# Patient Record
Sex: FEMALE | Race: WHITE | Employment: OTHER
[De-identification: names, ages, dates, MRNs, and addresses within clinical notes are randomized per-mention and may not be internally consistent; named-entity substitution may affect disease eponyms.]

---

## 2017-02-20 ENCOUNTER — OFFICE VISIT (OUTPATIENT)
Dept: INTERNAL MEDICINE | Facility: CLINIC | Age: 67
End: 2017-02-20

## 2017-02-20 VITALS
BODY MASS INDEX: 30.22 KG/M2 | HEIGHT: 64 IN | SYSTOLIC BLOOD PRESSURE: 130 MMHG | WEIGHT: 177 LBS | DIASTOLIC BLOOD PRESSURE: 76 MMHG

## 2017-02-20 DIAGNOSIS — R60.0 EDEMA EXTREMITIES: ICD-10-CM

## 2017-02-20 DIAGNOSIS — M47.816 FACET ARTHRITIS OF LUMBAR REGION: ICD-10-CM

## 2017-02-20 DIAGNOSIS — I10 ESSENTIAL HYPERTENSION: ICD-10-CM

## 2017-02-20 DIAGNOSIS — E78.2 MIXED HYPERLIPIDEMIA: ICD-10-CM

## 2017-02-20 DIAGNOSIS — G57.12 MERALGIA PARAESTHETICA, LEFT: ICD-10-CM

## 2017-02-20 DIAGNOSIS — F33.1 MODERATE EPISODE OF RECURRENT MAJOR DEPRESSIVE DISORDER (HCC): ICD-10-CM

## 2017-02-20 DIAGNOSIS — I63.429 CEREBROVASCULAR ACCIDENT (CVA) DUE TO EMBOLISM OF ANTERIOR CEREBRAL ARTERY, UNSPECIFIED BLOOD VESSEL LATERALITY (HCC): Primary | ICD-10-CM

## 2017-02-20 PROCEDURE — 1090F PRES/ABSN URINE INCON ASSESS: CPT | Performed by: INTERNAL MEDICINE

## 2017-02-20 PROCEDURE — G8484 FLU IMMUNIZE NO ADMIN: HCPCS | Performed by: INTERNAL MEDICINE

## 2017-02-20 PROCEDURE — G8419 CALC BMI OUT NRM PARAM NOF/U: HCPCS | Performed by: INTERNAL MEDICINE

## 2017-02-20 PROCEDURE — 4004F PT TOBACCO SCREEN RCVD TLK: CPT | Performed by: INTERNAL MEDICINE

## 2017-02-20 PROCEDURE — 4040F PNEUMOC VAC/ADMIN/RCVD: CPT | Performed by: INTERNAL MEDICINE

## 2017-02-20 PROCEDURE — G8598 ASA/ANTIPLAT THER USED: HCPCS | Performed by: INTERNAL MEDICINE

## 2017-02-20 PROCEDURE — 99214 OFFICE O/P EST MOD 30 MIN: CPT | Performed by: INTERNAL MEDICINE

## 2017-02-20 PROCEDURE — 3014F SCREEN MAMMO DOC REV: CPT | Performed by: INTERNAL MEDICINE

## 2017-02-20 PROCEDURE — 1123F ACP DISCUSS/DSCN MKR DOCD: CPT | Performed by: INTERNAL MEDICINE

## 2017-02-20 PROCEDURE — G8427 DOCREV CUR MEDS BY ELIG CLIN: HCPCS | Performed by: INTERNAL MEDICINE

## 2017-02-20 PROCEDURE — G8399 PT W/DXA RESULTS DOCUMENT: HCPCS | Performed by: INTERNAL MEDICINE

## 2017-02-20 PROCEDURE — 3017F COLORECTAL CA SCREEN DOC REV: CPT | Performed by: INTERNAL MEDICINE

## 2017-02-20 RX ORDER — GABAPENTIN 300 MG/1
600 CAPSULE ORAL 2 TIMES DAILY
Qty: 360 CAPSULE | Refills: 3 | Status: ON HOLD | OUTPATIENT
Start: 2017-02-20 | End: 2017-08-01 | Stop reason: HOSPADM

## 2017-02-20 RX ORDER — FENOFIBRATE 134 MG/1
134 CAPSULE ORAL
Qty: 90 CAPSULE | Refills: 3 | Status: SHIPPED | OUTPATIENT
Start: 2017-02-20 | End: 2018-01-08 | Stop reason: SDUPTHER

## 2017-02-20 RX ORDER — LISINOPRIL 20 MG/1
20 TABLET ORAL DAILY
Qty: 90 TABLET | Refills: 3 | Status: SHIPPED | OUTPATIENT
Start: 2017-02-20 | End: 2017-08-03 | Stop reason: DRUGHIGH

## 2017-02-20 RX ORDER — HYDROCHLOROTHIAZIDE 25 MG/1
25 TABLET ORAL DAILY
Qty: 90 TABLET | Refills: 3 | Status: ON HOLD | OUTPATIENT
Start: 2017-02-20 | End: 2017-08-01 | Stop reason: HOSPADM

## 2017-02-20 RX ORDER — PRAMIPEXOLE DIHYDROCHLORIDE 1 MG/1
1.5 TABLET ORAL DAILY
Qty: 135 TABLET | Refills: 3 | Status: SHIPPED | OUTPATIENT
Start: 2017-02-20 | End: 2017-09-18 | Stop reason: ALTCHOICE

## 2017-02-20 RX ORDER — ATORVASTATIN CALCIUM 80 MG/1
80 TABLET, FILM COATED ORAL NIGHTLY
Qty: 90 TABLET | Refills: 3 | Status: SHIPPED | OUTPATIENT
Start: 2017-02-20 | End: 2018-01-08 | Stop reason: SDUPTHER

## 2017-02-20 RX ORDER — FAMOTIDINE 20 MG/1
20 TABLET, FILM COATED ORAL 2 TIMES DAILY
Qty: 180 TABLET | Refills: 3 | Status: SHIPPED | OUTPATIENT
Start: 2017-02-20 | End: 2018-01-08 | Stop reason: SDUPTHER

## 2017-02-20 RX ORDER — CYCLOBENZAPRINE HCL 10 MG
10 TABLET ORAL 3 TIMES DAILY PRN
Qty: 270 TABLET | Refills: 3 | Status: SHIPPED | OUTPATIENT
Start: 2017-02-20 | End: 2018-01-08 | Stop reason: SDUPTHER

## 2017-02-20 ASSESSMENT — ENCOUNTER SYMPTOMS
COLOR CHANGE: 0
DIARRHEA: 0
CHEST TIGHTNESS: 0
EYE PAIN: 0
BLOOD IN STOOL: 0
PHOTOPHOBIA: 0
SHORTNESS OF BREATH: 0
EYE DISCHARGE: 0
FACIAL SWELLING: 0
CONSTIPATION: 0
ANAL BLEEDING: 0
STRIDOR: 0
RHINORRHEA: 0
ABDOMINAL PAIN: 0
VOICE CHANGE: 0
SINUS PRESSURE: 0
TROUBLE SWALLOWING: 0
EYE ITCHING: 0
WHEEZING: 0
SORE THROAT: 0
COUGH: 0
NAUSEA: 0
ABDOMINAL DISTENTION: 0
RECTAL PAIN: 0
VOMITING: 0
CHOKING: 0
BACK PAIN: 1
EYE REDNESS: 0
APNEA: 0

## 2017-04-27 ENCOUNTER — HOSPITAL ENCOUNTER (OUTPATIENT)
Dept: PAIN MANAGEMENT | Age: 67
Discharge: HOME | End: 2017-04-27
Payer: MEDICARE

## 2017-04-27 VITALS
DIASTOLIC BLOOD PRESSURE: 77 MMHG | RESPIRATION RATE: 16 BRPM | SYSTOLIC BLOOD PRESSURE: 140 MMHG | HEIGHT: 64 IN | BODY MASS INDEX: 29.88 KG/M2 | TEMPERATURE: 98.4 F | OXYGEN SATURATION: 94 % | WEIGHT: 175 LBS | HEART RATE: 63 BPM

## 2017-04-27 DIAGNOSIS — M16.11 PRIMARY OSTEOARTHRITIS OF RIGHT HIP: ICD-10-CM

## 2017-04-27 DIAGNOSIS — G57.11 MERALGIA PARAESTHETICA, RIGHT: ICD-10-CM

## 2017-04-27 DIAGNOSIS — M47.816 FACET ARTHRITIS OF LUMBAR REGION: ICD-10-CM

## 2017-04-27 DIAGNOSIS — M51.36 LUMBAR DEGENERATIVE DISC DISEASE: Primary | ICD-10-CM

## 2017-04-27 DIAGNOSIS — M47.816 SPONDYLOSIS OF LUMBAR REGION WITHOUT MYELOPATHY OR RADICULOPATHY: ICD-10-CM

## 2017-04-27 PROCEDURE — 80307 DRUG TEST PRSMV CHEM ANLYZR: CPT

## 2017-04-27 PROCEDURE — 99204 OFFICE O/P NEW MOD 45 MIN: CPT

## 2017-04-27 PROCEDURE — 99204 OFFICE O/P NEW MOD 45 MIN: CPT | Performed by: PAIN MEDICINE

## 2017-04-27 RX ORDER — IBUPROFEN 200 MG
TABLET ORAL
Status: ON HOLD | COMMUNITY
End: 2017-08-01 | Stop reason: HOSPADM

## 2017-04-27 ASSESSMENT — ENCOUNTER SYMPTOMS
SHORTNESS OF BREATH: 0
SORE THROAT: 0
VOMITING: 0
DOUBLE VISION: 0
COUGH: 0
HEARTBURN: 0
BACK PAIN: 1
NAUSEA: 0
BLURRED VISION: 0
ORTHOPNEA: 0

## 2017-04-27 ASSESSMENT — PAIN DESCRIPTION - ORIENTATION: ORIENTATION: RIGHT;LEFT;LOWER

## 2017-04-27 ASSESSMENT — PAIN DESCRIPTION - DESCRIPTORS: DESCRIPTORS: ACHING;BURNING

## 2017-04-27 ASSESSMENT — PAIN DESCRIPTION - ONSET: ONSET: ON-GOING

## 2017-04-27 ASSESSMENT — PAIN DESCRIPTION - PROGRESSION: CLINICAL_PROGRESSION: GRADUALLY WORSENING

## 2017-04-27 ASSESSMENT — PAIN DESCRIPTION - FREQUENCY: FREQUENCY: INTERMITTENT

## 2017-04-27 ASSESSMENT — PAIN DESCRIPTION - LOCATION: LOCATION: BACK;HIP

## 2017-04-27 ASSESSMENT — PAIN DESCRIPTION - PAIN TYPE: TYPE: CHRONIC PAIN

## 2017-05-02 LAB
6-ACETYLMORPHINE, UR: NOT DETECTED
7-AMINOCLONAZEPAM, URINE: NOT DETECTED
ALPHA-OH-ALPRAZ, URINE: NOT DETECTED
ALPRAZOLAM, URINE: NOT DETECTED
AMPHETAMINES, URINE: NOT DETECTED
BARBITURATES, URINE: NOT DETECTED
BENZOYLECGONINE, UR: NOT DETECTED
BUPRENORPHINE URINE: NOT DETECTED
CARISOPRODOL, UR: NOT DETECTED
CLONAZEPAM, URINE: NOT DETECTED
CODEINE, URINE: NOT DETECTED
CREATININE URINE: 72.8 MG/DL (ref 20–400)
DIAZEPAM, URINE: NOT DETECTED
DRUGS EXPECTED, UR: NORMAL
EER HI RES INTERP UR: NORMAL
ETHYL GLUCURONIDE UR: NOT DETECTED
FENTANYL URINE: NOT DETECTED
HYDROCODONE, URINE: PRESENT
HYDROMORPHONE, URINE: NOT DETECTED
LORAZEPAM, URINE: NOT DETECTED
MARIJUANA METAB, UR: NOT DETECTED
MDA, UR: NOT DETECTED
MDEA, EVE, UR: NOT DETECTED
MDMA URINE: NOT DETECTED
MEPERIDINE METAB, UR: NOT DETECTED
METHADONE, URINE: NOT DETECTED
METHAMPHETAMINE, URINE: NOT DETECTED
METHYLPHENIDATE: NOT DETECTED
MIDAZOLAM, URINE: NOT DETECTED
MORPHINE URINE: NOT DETECTED
NORBUPRENORPHINE, URINE: NOT DETECTED
NORDIAZEPAM, URINE: NOT DETECTED
NORFENTANYL, URINE: NOT DETECTED
NORHYDROCODONE, URINE: PRESENT
NOROXYCODONE, URINE: NOT DETECTED
NOROXYMORPHONE, URINE: NOT DETECTED
OXAZEPAM, URINE: NOT DETECTED
OXYCODONE URINE: NOT DETECTED
OXYMORPHONE, URINE: NOT DETECTED
PAIN MANAGEMENT DRUG PANEL INTERP, URINE: NORMAL
PAIN MGT DRUG PANEL, HI RES, UR: NORMAL
PCP,URINE: NOT DETECTED
PHENTERMINE, UR: NOT DETECTED
PROPOXYPHENE, URINE: NOT DETECTED
TAPENTADOL, URINE: NOT DETECTED
TAPENTADOL-O-SULFATE, URINE: NOT DETECTED
TEMAZEPAM, URINE: NOT DETECTED
TRAMADOL, URINE: NOT DETECTED
ZOLPIDEM, URINE: NOT DETECTED

## 2017-05-15 ENCOUNTER — HOSPITAL ENCOUNTER (OUTPATIENT)
Dept: PAIN MANAGEMENT | Age: 67
Discharge: HOME | End: 2017-05-15
Payer: MEDICARE

## 2017-05-15 ENCOUNTER — HOSPITAL ENCOUNTER (OUTPATIENT)
Dept: GENERAL RADIOLOGY | Age: 67
Discharge: HOME | End: 2017-05-15
Payer: MEDICARE

## 2017-05-15 VITALS
BODY MASS INDEX: 29.88 KG/M2 | OXYGEN SATURATION: 97 % | SYSTOLIC BLOOD PRESSURE: 162 MMHG | RESPIRATION RATE: 16 BRPM | WEIGHT: 175 LBS | TEMPERATURE: 98.3 F | HEART RATE: 62 BPM | DIASTOLIC BLOOD PRESSURE: 98 MMHG | HEIGHT: 64 IN

## 2017-05-15 DIAGNOSIS — M47.816 SPONDYLOSIS OF LUMBAR REGION WITHOUT MYELOPATHY OR RADICULOPATHY: Primary | ICD-10-CM

## 2017-05-15 DIAGNOSIS — M47.816 FACET ARTHRITIS OF LUMBAR REGION: ICD-10-CM

## 2017-05-15 DIAGNOSIS — M51.36 LUMBAR DEGENERATIVE DISC DISEASE: ICD-10-CM

## 2017-05-15 PROCEDURE — 2500000003 HC RX 250 WO HCPCS

## 2017-05-15 PROCEDURE — 6360000004 HC RX CONTRAST MEDICATION

## 2017-05-15 PROCEDURE — 3209999900 FLUORO FOR SURGICAL PROCEDURES

## 2017-05-15 PROCEDURE — 64495 INJ PARAVERT F JNT L/S 3 LEV: CPT

## 2017-05-15 PROCEDURE — 64493 INJ PARAVERT F JNT L/S 1 LEV: CPT | Performed by: PAIN MEDICINE

## 2017-05-15 PROCEDURE — 64495 INJ PARAVERT F JNT L/S 3 LEV: CPT | Performed by: PAIN MEDICINE

## 2017-05-15 PROCEDURE — 64494 INJ PARAVERT F JNT L/S 2 LEV: CPT | Performed by: PAIN MEDICINE

## 2017-05-15 PROCEDURE — 6360000002 HC RX W HCPCS

## 2017-05-15 PROCEDURE — 6360000002 HC RX W HCPCS: Performed by: PAIN MEDICINE

## 2017-05-15 PROCEDURE — 64494 INJ PARAVERT F JNT L/S 2 LEV: CPT

## 2017-05-15 PROCEDURE — 64493 INJ PARAVERT F JNT L/S 1 LEV: CPT

## 2017-05-15 RX ORDER — MIDAZOLAM HYDROCHLORIDE 1 MG/ML
INJECTION INTRAMUSCULAR; INTRAVENOUS
Status: COMPLETED | OUTPATIENT
Start: 2017-05-15 | End: 2017-05-15

## 2017-05-15 RX ORDER — FENTANYL CITRATE 50 UG/ML
INJECTION, SOLUTION INTRAMUSCULAR; INTRAVENOUS
Status: DISCONTINUED | OUTPATIENT
Start: 2017-05-15 | End: 2017-05-16 | Stop reason: HOSPADM

## 2017-05-15 RX ADMIN — MIDAZOLAM 1 MG: 1 INJECTION INTRAMUSCULAR; INTRAVENOUS at 09:27

## 2017-05-15 RX ADMIN — MIDAZOLAM 2 MG: 1 INJECTION INTRAMUSCULAR; INTRAVENOUS at 09:18

## 2017-05-15 ASSESSMENT — PAIN - FUNCTIONAL ASSESSMENT
PAIN_FUNCTIONAL_ASSESSMENT: 0-10

## 2017-05-15 ASSESSMENT — PAIN DESCRIPTION - DESCRIPTORS: DESCRIPTORS: ACHING;BURNING;NUMBNESS;TINGLING

## 2017-05-17 ENCOUNTER — TELEPHONE (OUTPATIENT)
Dept: PAIN MANAGEMENT | Age: 67
End: 2017-05-17

## 2017-05-22 ENCOUNTER — OFFICE VISIT (OUTPATIENT)
Dept: INTERNAL MEDICINE CLINIC | Age: 67
End: 2017-05-22
Payer: MEDICARE

## 2017-05-22 VITALS
SYSTOLIC BLOOD PRESSURE: 200 MMHG | DIASTOLIC BLOOD PRESSURE: 88 MMHG | BODY MASS INDEX: 30.56 KG/M2 | HEIGHT: 64 IN | WEIGHT: 179 LBS

## 2017-05-22 DIAGNOSIS — I10 ESSENTIAL HYPERTENSION: ICD-10-CM

## 2017-05-22 DIAGNOSIS — E78.2 MIXED HYPERLIPIDEMIA: ICD-10-CM

## 2017-05-22 DIAGNOSIS — K57.30 DIVERTICULOSIS OF LARGE INTESTINE WITHOUT HEMORRHAGE: ICD-10-CM

## 2017-05-22 DIAGNOSIS — K21.9 GASTROESOPHAGEAL REFLUX DISEASE WITHOUT ESOPHAGITIS: ICD-10-CM

## 2017-05-22 DIAGNOSIS — R00.1 BRADYCARDIA: ICD-10-CM

## 2017-05-22 DIAGNOSIS — Z23 NEED FOR PROPHYLACTIC VACCINATION AGAINST STREPTOCOCCUS PNEUMONIAE (PNEUMOCOCCUS): Primary | ICD-10-CM

## 2017-05-22 DIAGNOSIS — M51.36 LUMBAR DEGENERATIVE DISC DISEASE: ICD-10-CM

## 2017-05-22 DIAGNOSIS — G45.2 MULTIPLE AND BILATERAL PRECEREBRAL ARTERY SYNDROMES: ICD-10-CM

## 2017-05-22 DIAGNOSIS — M94.9 DISORDER OF CARTILAGE: ICD-10-CM

## 2017-05-22 DIAGNOSIS — N17.9 AKI (ACUTE KIDNEY INJURY) (HCC): ICD-10-CM

## 2017-05-22 PROCEDURE — G8399 PT W/DXA RESULTS DOCUMENT: HCPCS | Performed by: INTERNAL MEDICINE

## 2017-05-22 PROCEDURE — 3014F SCREEN MAMMO DOC REV: CPT | Performed by: INTERNAL MEDICINE

## 2017-05-22 PROCEDURE — G8417 CALC BMI ABV UP PARAM F/U: HCPCS | Performed by: INTERNAL MEDICINE

## 2017-05-22 PROCEDURE — 4040F PNEUMOC VAC/ADMIN/RCVD: CPT | Performed by: INTERNAL MEDICINE

## 2017-05-22 PROCEDURE — G8598 ASA/ANTIPLAT THER USED: HCPCS | Performed by: INTERNAL MEDICINE

## 2017-05-22 PROCEDURE — 1090F PRES/ABSN URINE INCON ASSESS: CPT | Performed by: INTERNAL MEDICINE

## 2017-05-22 PROCEDURE — 1123F ACP DISCUSS/DSCN MKR DOCD: CPT | Performed by: INTERNAL MEDICINE

## 2017-05-22 PROCEDURE — 4004F PT TOBACCO SCREEN RCVD TLK: CPT | Performed by: INTERNAL MEDICINE

## 2017-05-22 PROCEDURE — G8427 DOCREV CUR MEDS BY ELIG CLIN: HCPCS | Performed by: INTERNAL MEDICINE

## 2017-05-22 PROCEDURE — 3017F COLORECTAL CA SCREEN DOC REV: CPT | Performed by: INTERNAL MEDICINE

## 2017-05-22 PROCEDURE — 99214 OFFICE O/P EST MOD 30 MIN: CPT | Performed by: INTERNAL MEDICINE

## 2017-05-22 RX ORDER — LISINOPRIL 30 MG/1
30 TABLET ORAL DAILY
Qty: 30 TABLET | Refills: 3 | Status: SHIPPED | OUTPATIENT
Start: 2017-05-22 | End: 2017-07-03 | Stop reason: SDUPTHER

## 2017-05-22 ASSESSMENT — ENCOUNTER SYMPTOMS
WHEEZING: 0
DIARRHEA: 0
APNEA: 0
COLOR CHANGE: 0
TROUBLE SWALLOWING: 0
RHINORRHEA: 0
CHOKING: 0
CONSTIPATION: 0
RECTAL PAIN: 0
SORE THROAT: 0
EYE ITCHING: 0
ANAL BLEEDING: 0
VOICE CHANGE: 0
ABDOMINAL DISTENTION: 0
PHOTOPHOBIA: 0
COUGH: 0
SHORTNESS OF BREATH: 0
FACIAL SWELLING: 0
ABDOMINAL PAIN: 0
EYE DISCHARGE: 0
NAUSEA: 0
CHEST TIGHTNESS: 0
BACK PAIN: 1
STRIDOR: 0
BLOOD IN STOOL: 0
EYE PAIN: 0
EYE REDNESS: 0
VOMITING: 0
SINUS PRESSURE: 0

## 2017-05-22 ASSESSMENT — PATIENT HEALTH QUESTIONNAIRE - PHQ9
SUM OF ALL RESPONSES TO PHQ QUESTIONS 1-9: 0
1. LITTLE INTEREST OR PLEASURE IN DOING THINGS: 0
2. FEELING DOWN, DEPRESSED OR HOPELESS: 0
SUM OF ALL RESPONSES TO PHQ9 QUESTIONS 1 & 2: 0

## 2017-05-23 PROCEDURE — G0009 ADMIN PNEUMOCOCCAL VACCINE: HCPCS | Performed by: INTERNAL MEDICINE

## 2017-05-23 PROCEDURE — 90670 PCV13 VACCINE IM: CPT | Performed by: INTERNAL MEDICINE

## 2017-06-05 ENCOUNTER — HOSPITAL ENCOUNTER (OUTPATIENT)
Dept: PAIN MANAGEMENT | Age: 67
Discharge: HOME | End: 2017-06-05
Payer: MEDICARE

## 2017-06-05 VITALS
BODY MASS INDEX: 30.56 KG/M2 | DIASTOLIC BLOOD PRESSURE: 66 MMHG | RESPIRATION RATE: 16 BRPM | SYSTOLIC BLOOD PRESSURE: 147 MMHG | WEIGHT: 179 LBS | OXYGEN SATURATION: 94 % | HEIGHT: 64 IN | HEART RATE: 63 BPM | TEMPERATURE: 98 F

## 2017-06-05 DIAGNOSIS — G57.11 MERALGIA PARAESTHETICA, RIGHT: ICD-10-CM

## 2017-06-05 DIAGNOSIS — M47.816 FACET ARTHRITIS OF LUMBAR REGION: ICD-10-CM

## 2017-06-05 DIAGNOSIS — M47.816 SPONDYLOSIS OF LUMBAR REGION WITHOUT MYELOPATHY OR RADICULOPATHY: Primary | ICD-10-CM

## 2017-06-05 DIAGNOSIS — M51.36 LUMBAR DEGENERATIVE DISC DISEASE: ICD-10-CM

## 2017-06-05 DIAGNOSIS — M16.11 PRIMARY OSTEOARTHRITIS OF RIGHT HIP: ICD-10-CM

## 2017-06-05 PROCEDURE — 99214 OFFICE O/P EST MOD 30 MIN: CPT | Performed by: PAIN MEDICINE

## 2017-06-05 PROCEDURE — 99213 OFFICE O/P EST LOW 20 MIN: CPT

## 2017-06-05 RX ORDER — HYDROCODONE BITARTRATE AND ACETAMINOPHEN 5; 325 MG/1; MG/1
1 TABLET ORAL EVERY 8 HOURS PRN
Qty: 30 TABLET | Refills: 0 | Status: SHIPPED | OUTPATIENT
Start: 2017-06-05 | End: 2017-06-20 | Stop reason: SDUPTHER

## 2017-06-05 ASSESSMENT — PAIN DESCRIPTION - LOCATION: LOCATION: BACK

## 2017-06-05 ASSESSMENT — ENCOUNTER SYMPTOMS
BACK PAIN: 1
GASTROINTESTINAL NEGATIVE: 1
EYES NEGATIVE: 1
RESPIRATORY NEGATIVE: 1

## 2017-06-05 ASSESSMENT — PAIN DESCRIPTION - FREQUENCY: FREQUENCY: INTERMITTENT

## 2017-06-05 ASSESSMENT — PAIN DESCRIPTION - DESCRIPTORS: DESCRIPTORS: ACHING;CONSTANT;DULL;JABBING;NAGGING;SHARP

## 2017-06-05 ASSESSMENT — PAIN SCALES - GENERAL: PAINLEVEL_OUTOF10: 2

## 2017-06-05 ASSESSMENT — PAIN DESCRIPTION - PAIN TYPE: TYPE: CHRONIC PAIN

## 2017-06-05 ASSESSMENT — PAIN DESCRIPTION - PROGRESSION: CLINICAL_PROGRESSION: GRADUALLY WORSENING

## 2017-06-05 ASSESSMENT — PAIN DESCRIPTION - ONSET: ONSET: ON-GOING

## 2017-06-05 ASSESSMENT — PAIN DESCRIPTION - ORIENTATION: ORIENTATION: LEFT

## 2017-06-20 ENCOUNTER — HOSPITAL ENCOUNTER (OUTPATIENT)
Dept: GENERAL RADIOLOGY | Age: 67
Discharge: HOME | End: 2017-06-20
Payer: MEDICARE

## 2017-06-20 ENCOUNTER — HOSPITAL ENCOUNTER (OUTPATIENT)
Dept: PAIN MANAGEMENT | Age: 67
Discharge: HOME | End: 2017-06-20
Payer: MEDICARE

## 2017-06-20 VITALS
WEIGHT: 179 LBS | HEIGHT: 64 IN | DIASTOLIC BLOOD PRESSURE: 76 MMHG | OXYGEN SATURATION: 98 % | RESPIRATION RATE: 18 BRPM | TEMPERATURE: 98.5 F | SYSTOLIC BLOOD PRESSURE: 136 MMHG | HEART RATE: 66 BPM | BODY MASS INDEX: 30.56 KG/M2

## 2017-06-20 DIAGNOSIS — M47.816 FACET ARTHRITIS OF LUMBAR REGION: ICD-10-CM

## 2017-06-20 DIAGNOSIS — M47.816 SPONDYLOSIS OF LUMBAR REGION WITHOUT MYELOPATHY OR RADICULOPATHY: Primary | ICD-10-CM

## 2017-06-20 PROCEDURE — 64494 INJ PARAVERT F JNT L/S 2 LEV: CPT

## 2017-06-20 PROCEDURE — 6360000002 HC RX W HCPCS: Performed by: PAIN MEDICINE

## 2017-06-20 PROCEDURE — 3209999900 FLUORO FOR SURGICAL PROCEDURES

## 2017-06-20 PROCEDURE — 64495 INJ PARAVERT F JNT L/S 3 LEV: CPT

## 2017-06-20 PROCEDURE — 64494 INJ PARAVERT F JNT L/S 2 LEV: CPT | Performed by: PAIN MEDICINE

## 2017-06-20 PROCEDURE — 64495 INJ PARAVERT F JNT L/S 3 LEV: CPT | Performed by: PAIN MEDICINE

## 2017-06-20 PROCEDURE — 64493 INJ PARAVERT F JNT L/S 1 LEV: CPT

## 2017-06-20 PROCEDURE — 64493 INJ PARAVERT F JNT L/S 1 LEV: CPT | Performed by: PAIN MEDICINE

## 2017-06-20 RX ORDER — FENTANYL CITRATE 50 UG/ML
INJECTION, SOLUTION INTRAMUSCULAR; INTRAVENOUS
Status: COMPLETED | OUTPATIENT
Start: 2017-06-20 | End: 2017-06-20

## 2017-06-20 RX ORDER — HYDROCODONE BITARTRATE AND ACETAMINOPHEN 5; 325 MG/1; MG/1
1 TABLET ORAL EVERY 12 HOURS PRN
Qty: 60 TABLET | Refills: 0 | Status: SHIPPED | OUTPATIENT
Start: 2017-06-20 | End: 2017-07-11 | Stop reason: SDUPTHER

## 2017-06-20 RX ORDER — MIDAZOLAM HYDROCHLORIDE 1 MG/ML
INJECTION INTRAMUSCULAR; INTRAVENOUS
Status: COMPLETED | OUTPATIENT
Start: 2017-06-20 | End: 2017-06-20

## 2017-06-20 RX ADMIN — FENTANYL CITRATE 25 MCG: 50 INJECTION INTRAMUSCULAR; INTRAVENOUS at 09:54

## 2017-06-20 RX ADMIN — FENTANYL CITRATE 25 MCG: 50 INJECTION INTRAMUSCULAR; INTRAVENOUS at 09:59

## 2017-06-20 RX ADMIN — MIDAZOLAM 2 MG: 1 INJECTION INTRAMUSCULAR; INTRAVENOUS at 09:51

## 2017-06-20 ASSESSMENT — PAIN DESCRIPTION - FREQUENCY: FREQUENCY: INTERMITTENT

## 2017-06-20 ASSESSMENT — PAIN - FUNCTIONAL ASSESSMENT
PAIN_FUNCTIONAL_ASSESSMENT: 0-10
PAIN_FUNCTIONAL_ASSESSMENT: 0-10

## 2017-06-20 ASSESSMENT — PAIN DESCRIPTION - PROGRESSION: CLINICAL_PROGRESSION: NOT CHANGED

## 2017-06-20 ASSESSMENT — PAIN DESCRIPTION - LOCATION: LOCATION: BACK

## 2017-06-20 ASSESSMENT — ACTIVITIES OF DAILY LIVING (ADL): EFFECT OF PAIN ON DAILY ACTIVITIES: PAIN INCREASES WITH PHYSICAL ACTIVITY

## 2017-06-20 ASSESSMENT — PAIN DESCRIPTION - ONSET: ONSET: ON-GOING

## 2017-06-20 ASSESSMENT — PAIN DESCRIPTION - PAIN TYPE: TYPE: CHRONIC PAIN

## 2017-06-20 ASSESSMENT — PAIN DESCRIPTION - ORIENTATION: ORIENTATION: RIGHT;LEFT

## 2017-07-01 ENCOUNTER — HOSPITAL ENCOUNTER (OUTPATIENT)
Age: 67
Discharge: HOME | End: 2017-07-01
Payer: MEDICARE

## 2017-07-01 DIAGNOSIS — M94.9 DISORDER OF CARTILAGE: ICD-10-CM

## 2017-07-01 DIAGNOSIS — R00.1 BRADYCARDIA: ICD-10-CM

## 2017-07-01 DIAGNOSIS — M51.36 LUMBAR DEGENERATIVE DISC DISEASE: ICD-10-CM

## 2017-07-01 DIAGNOSIS — I10 ESSENTIAL HYPERTENSION: ICD-10-CM

## 2017-07-01 LAB
ABSOLUTE EOS #: 0.2 K/UL (ref 0–0.4)
ABSOLUTE LYMPH #: 3.1 K/UL (ref 1–4.8)
ABSOLUTE MONO #: 1.3 K/UL (ref 0.1–1.3)
ALBUMIN SERPL-MCNC: 4.1 G/DL (ref 3.5–5.2)
ALBUMIN/GLOBULIN RATIO: ABNORMAL (ref 1–2.5)
ALP BLD-CCNC: 33 U/L (ref 35–104)
ALT SERPL-CCNC: 20 U/L (ref 5–33)
ANION GAP SERPL CALCULATED.3IONS-SCNC: 12 MMOL/L (ref 9–17)
AST SERPL-CCNC: 15 U/L
BASOPHILS # BLD: 1 %
BASOPHILS ABSOLUTE: 0.1 K/UL (ref 0–0.2)
BILIRUB SERPL-MCNC: 0.39 MG/DL (ref 0.3–1.2)
BILIRUBIN URINE: NEGATIVE
BUN BLDV-MCNC: 35 MG/DL (ref 8–23)
BUN/CREAT BLD: ABNORMAL (ref 9–20)
CALCIUM SERPL-MCNC: 9.3 MG/DL (ref 8.6–10.4)
CHLORIDE BLD-SCNC: 105 MMOL/L (ref 98–107)
CHOLESTEROL/HDL RATIO: 1.8
CHOLESTEROL: 116 MG/DL
CO2: 27 MMOL/L (ref 20–31)
COLOR: YELLOW
COMMENT UA: NORMAL
CREAT SERPL-MCNC: 0.75 MG/DL (ref 0.5–0.9)
DIFFERENTIAL TYPE: ABNORMAL
EOSINOPHILS RELATIVE PERCENT: 2 %
GFR AFRICAN AMERICAN: >60 ML/MIN
GFR NON-AFRICAN AMERICAN: >60 ML/MIN
GFR SERPL CREATININE-BSD FRML MDRD: ABNORMAL ML/MIN/{1.73_M2}
GFR SERPL CREATININE-BSD FRML MDRD: ABNORMAL ML/MIN/{1.73_M2}
GLUCOSE BLD-MCNC: 116 MG/DL (ref 70–99)
GLUCOSE URINE: NEGATIVE
HCT VFR BLD CALC: 39.9 % (ref 36–46)
HDLC SERPL-MCNC: 63 MG/DL
HEMOGLOBIN: 13.3 G/DL (ref 12–16)
KETONES, URINE: NEGATIVE
LDL CHOLESTEROL: 41 MG/DL (ref 0–130)
LEUKOCYTE ESTERASE, URINE: NEGATIVE
LYMPHOCYTES # BLD: 23 %
MCH RBC QN AUTO: 32 PG (ref 26–34)
MCHC RBC AUTO-ENTMCNC: 33.2 G/DL (ref 31–37)
MCV RBC AUTO: 96.2 FL (ref 80–100)
MONOCYTES # BLD: 10 %
NITRITE, URINE: NEGATIVE
PDW BLD-RTO: 14.1 % (ref 11.5–14.9)
PH UA: 6.5 (ref 5–8)
PLATELET # BLD: 291 K/UL (ref 150–450)
PLATELET ESTIMATE: ABNORMAL
PMV BLD AUTO: 7.1 FL (ref 6–12)
POTASSIUM SERPL-SCNC: 4.4 MMOL/L (ref 3.7–5.3)
PROTEIN UA: NEGATIVE
RBC # BLD: 4.15 M/UL (ref 4–5.2)
RBC # BLD: ABNORMAL 10*6/UL
SEG NEUTROPHILS: 64 %
SEGMENTED NEUTROPHILS ABSOLUTE COUNT: 8.8 K/UL (ref 1.3–9.1)
SODIUM BLD-SCNC: 144 MMOL/L (ref 135–144)
SPECIFIC GRAVITY UA: 1.03 (ref 1–1.03)
TOTAL PROTEIN: 6.6 G/DL (ref 6.4–8.3)
TRIGL SERPL-MCNC: 59 MG/DL
TSH SERPL DL<=0.05 MIU/L-ACNC: 1.37 MIU/L (ref 0.3–5)
TURBIDITY: CLEAR
URINE HGB: NEGATIVE
UROBILINOGEN, URINE: NORMAL
VITAMIN D 25-HYDROXY: 27.1 NG/ML (ref 30–100)
VLDLC SERPL CALC-MCNC: NORMAL MG/DL (ref 1–30)
WBC # BLD: 13.5 K/UL (ref 3.5–11)
WBC # BLD: ABNORMAL 10*3/UL

## 2017-07-01 PROCEDURE — 80061 LIPID PANEL: CPT

## 2017-07-01 PROCEDURE — 85025 COMPLETE CBC W/AUTO DIFF WBC: CPT

## 2017-07-01 PROCEDURE — 81003 URINALYSIS AUTO W/O SCOPE: CPT

## 2017-07-01 PROCEDURE — 36415 COLL VENOUS BLD VENIPUNCTURE: CPT

## 2017-07-01 PROCEDURE — 80053 COMPREHEN METABOLIC PANEL: CPT

## 2017-07-01 PROCEDURE — 82306 VITAMIN D 25 HYDROXY: CPT

## 2017-07-01 PROCEDURE — 84443 ASSAY THYROID STIM HORMONE: CPT

## 2017-07-03 ENCOUNTER — OFFICE VISIT (OUTPATIENT)
Dept: INTERNAL MEDICINE CLINIC | Age: 67
End: 2017-07-03
Payer: MEDICARE

## 2017-07-03 VITALS
SYSTOLIC BLOOD PRESSURE: 120 MMHG | DIASTOLIC BLOOD PRESSURE: 78 MMHG | WEIGHT: 172 LBS | HEIGHT: 64 IN | BODY MASS INDEX: 29.37 KG/M2

## 2017-07-03 DIAGNOSIS — N17.9 AKI (ACUTE KIDNEY INJURY) (HCC): ICD-10-CM

## 2017-07-03 DIAGNOSIS — I10 ESSENTIAL HYPERTENSION: ICD-10-CM

## 2017-07-03 DIAGNOSIS — R60.0 EDEMA EXTREMITIES: ICD-10-CM

## 2017-07-03 DIAGNOSIS — E55.9 VITAMIN D DEFICIENCY: ICD-10-CM

## 2017-07-03 DIAGNOSIS — I63.429 CEREBROVASCULAR ACCIDENT (CVA) DUE TO EMBOLISM OF ANTERIOR CEREBRAL ARTERY, UNSPECIFIED BLOOD VESSEL LATERALITY (HCC): Primary | ICD-10-CM

## 2017-07-03 DIAGNOSIS — M51.36 LUMBAR DEGENERATIVE DISC DISEASE: ICD-10-CM

## 2017-07-03 DIAGNOSIS — E78.2 MIXED HYPERLIPIDEMIA: ICD-10-CM

## 2017-07-03 PROCEDURE — 1036F TOBACCO NON-USER: CPT | Performed by: INTERNAL MEDICINE

## 2017-07-03 PROCEDURE — 3014F SCREEN MAMMO DOC REV: CPT | Performed by: INTERNAL MEDICINE

## 2017-07-03 PROCEDURE — 1090F PRES/ABSN URINE INCON ASSESS: CPT | Performed by: INTERNAL MEDICINE

## 2017-07-03 PROCEDURE — G8598 ASA/ANTIPLAT THER USED: HCPCS | Performed by: INTERNAL MEDICINE

## 2017-07-03 PROCEDURE — 1123F ACP DISCUSS/DSCN MKR DOCD: CPT | Performed by: INTERNAL MEDICINE

## 2017-07-03 PROCEDURE — 4040F PNEUMOC VAC/ADMIN/RCVD: CPT | Performed by: INTERNAL MEDICINE

## 2017-07-03 PROCEDURE — 99214 OFFICE O/P EST MOD 30 MIN: CPT | Performed by: INTERNAL MEDICINE

## 2017-07-03 PROCEDURE — G8427 DOCREV CUR MEDS BY ELIG CLIN: HCPCS | Performed by: INTERNAL MEDICINE

## 2017-07-03 PROCEDURE — G8417 CALC BMI ABV UP PARAM F/U: HCPCS | Performed by: INTERNAL MEDICINE

## 2017-07-03 PROCEDURE — 3017F COLORECTAL CA SCREEN DOC REV: CPT | Performed by: INTERNAL MEDICINE

## 2017-07-03 PROCEDURE — G8399 PT W/DXA RESULTS DOCUMENT: HCPCS | Performed by: INTERNAL MEDICINE

## 2017-07-03 RX ORDER — ATENOLOL 50 MG/1
50 TABLET ORAL DAILY
Qty: 90 TABLET | Refills: 3 | Status: SHIPPED | OUTPATIENT
Start: 2017-07-03 | End: 2017-10-13 | Stop reason: ALTCHOICE

## 2017-07-03 RX ORDER — LISINOPRIL 30 MG/1
30 TABLET ORAL DAILY
Qty: 90 TABLET | Refills: 3 | Status: ON HOLD | OUTPATIENT
Start: 2017-07-03 | End: 2017-08-01 | Stop reason: HOSPADM

## 2017-07-03 ASSESSMENT — ENCOUNTER SYMPTOMS
CHEST TIGHTNESS: 0
ANAL BLEEDING: 0
STRIDOR: 0
ABDOMINAL DISTENTION: 0
DIARRHEA: 0
EYE REDNESS: 0
APNEA: 0
EYE DISCHARGE: 0
ABDOMINAL PAIN: 0
TROUBLE SWALLOWING: 0
FACIAL SWELLING: 0
EYE ITCHING: 0
VOICE CHANGE: 0
CHOKING: 0
EYE PAIN: 0
RECTAL PAIN: 0
COUGH: 0
COLOR CHANGE: 0
SHORTNESS OF BREATH: 0
NAUSEA: 0
BLOOD IN STOOL: 0
SORE THROAT: 0
BACK PAIN: 1
SINUS PRESSURE: 0
RHINORRHEA: 0
VOMITING: 0
WHEEZING: 0
PHOTOPHOBIA: 0
CONSTIPATION: 0

## 2017-07-11 ENCOUNTER — HOSPITAL ENCOUNTER (OUTPATIENT)
Dept: PAIN MANAGEMENT | Age: 67
Discharge: HOME | End: 2017-07-11
Payer: MEDICARE

## 2017-07-11 VITALS
HEART RATE: 78 BPM | TEMPERATURE: 98.3 F | BODY MASS INDEX: 29.37 KG/M2 | WEIGHT: 172 LBS | SYSTOLIC BLOOD PRESSURE: 149 MMHG | HEIGHT: 64 IN | RESPIRATION RATE: 16 BRPM | OXYGEN SATURATION: 95 % | DIASTOLIC BLOOD PRESSURE: 88 MMHG

## 2017-07-11 DIAGNOSIS — M47.816 SPONDYLOSIS OF LUMBAR REGION WITHOUT MYELOPATHY OR RADICULOPATHY: Primary | ICD-10-CM

## 2017-07-11 DIAGNOSIS — M51.36 LUMBAR DEGENERATIVE DISC DISEASE: ICD-10-CM

## 2017-07-11 DIAGNOSIS — G57.11 MERALGIA PARAESTHETICA, RIGHT: ICD-10-CM

## 2017-07-11 DIAGNOSIS — M16.11 PRIMARY OSTEOARTHRITIS OF RIGHT HIP: ICD-10-CM

## 2017-07-11 DIAGNOSIS — M47.816 FACET ARTHRITIS OF LUMBAR REGION: ICD-10-CM

## 2017-07-11 PROCEDURE — 99213 OFFICE O/P EST LOW 20 MIN: CPT

## 2017-07-11 PROCEDURE — 99214 OFFICE O/P EST MOD 30 MIN: CPT | Performed by: PAIN MEDICINE

## 2017-07-11 RX ORDER — HYDROCODONE BITARTRATE AND ACETAMINOPHEN 5; 325 MG/1; MG/1
1 TABLET ORAL EVERY 12 HOURS PRN
Qty: 60 TABLET | Refills: 0 | Status: SHIPPED | OUTPATIENT
Start: 2017-07-20 | End: 2017-08-15 | Stop reason: SDUPTHER

## 2017-07-11 ASSESSMENT — ENCOUNTER SYMPTOMS
SORE THROAT: 0
RESPIRATORY NEGATIVE: 1
GASTROINTESTINAL NEGATIVE: 1
EYES NEGATIVE: 1
BACK PAIN: 1
SHORTNESS OF BREATH: 0

## 2017-07-11 ASSESSMENT — PAIN DESCRIPTION - DESCRIPTORS: DESCRIPTORS: ACHING

## 2017-07-11 ASSESSMENT — PAIN DESCRIPTION - ORIENTATION: ORIENTATION: MID;LOWER

## 2017-07-11 ASSESSMENT — PAIN DESCRIPTION - ONSET: ONSET: ON-GOING

## 2017-07-11 ASSESSMENT — PAIN DESCRIPTION - PAIN TYPE: TYPE: CHRONIC PAIN

## 2017-07-11 ASSESSMENT — PAIN SCALES - GENERAL: PAINLEVEL_OUTOF10: 0

## 2017-07-11 ASSESSMENT — PAIN DESCRIPTION - FREQUENCY: FREQUENCY: INTERMITTENT

## 2017-07-11 ASSESSMENT — PAIN DESCRIPTION - LOCATION: LOCATION: BACK;BUTTOCKS

## 2017-07-12 ASSESSMENT — ENCOUNTER SYMPTOMS
COUGH: 0
DOUBLE VISION: 0
BLURRED VISION: 0

## 2017-07-25 ENCOUNTER — TELEPHONE (OUTPATIENT)
Dept: INTERNAL MEDICINE CLINIC | Age: 67
End: 2017-07-25

## 2017-07-25 ENCOUNTER — OFFICE VISIT (OUTPATIENT)
Dept: INTERNAL MEDICINE CLINIC | Age: 67
End: 2017-07-25
Payer: MEDICARE

## 2017-07-25 VITALS
BODY MASS INDEX: 29.71 KG/M2 | HEIGHT: 64 IN | HEART RATE: 92 BPM | DIASTOLIC BLOOD PRESSURE: 80 MMHG | SYSTOLIC BLOOD PRESSURE: 144 MMHG | WEIGHT: 174 LBS

## 2017-07-25 DIAGNOSIS — Z13.9 SCREENING: Primary | ICD-10-CM

## 2017-07-25 DIAGNOSIS — L03.116 CELLULITIS OF LEFT LOWER EXTREMITY: ICD-10-CM

## 2017-07-25 DIAGNOSIS — I10 ESSENTIAL HYPERTENSION: ICD-10-CM

## 2017-07-25 DIAGNOSIS — Z12.31 ENCOUNTER FOR SCREENING MAMMOGRAM FOR MALIGNANT NEOPLASM OF BREAST: ICD-10-CM

## 2017-07-25 PROCEDURE — 99214 OFFICE O/P EST MOD 30 MIN: CPT | Performed by: INTERNAL MEDICINE

## 2017-07-25 PROCEDURE — G8417 CALC BMI ABV UP PARAM F/U: HCPCS | Performed by: INTERNAL MEDICINE

## 2017-07-25 PROCEDURE — 4040F PNEUMOC VAC/ADMIN/RCVD: CPT | Performed by: INTERNAL MEDICINE

## 2017-07-25 PROCEDURE — G8399 PT W/DXA RESULTS DOCUMENT: HCPCS | Performed by: INTERNAL MEDICINE

## 2017-07-25 PROCEDURE — 1090F PRES/ABSN URINE INCON ASSESS: CPT | Performed by: INTERNAL MEDICINE

## 2017-07-25 PROCEDURE — G8598 ASA/ANTIPLAT THER USED: HCPCS | Performed by: INTERNAL MEDICINE

## 2017-07-25 PROCEDURE — 1123F ACP DISCUSS/DSCN MKR DOCD: CPT | Performed by: INTERNAL MEDICINE

## 2017-07-25 PROCEDURE — G8427 DOCREV CUR MEDS BY ELIG CLIN: HCPCS | Performed by: INTERNAL MEDICINE

## 2017-07-25 PROCEDURE — 3014F SCREEN MAMMO DOC REV: CPT | Performed by: INTERNAL MEDICINE

## 2017-07-25 PROCEDURE — 3017F COLORECTAL CA SCREEN DOC REV: CPT | Performed by: INTERNAL MEDICINE

## 2017-07-25 PROCEDURE — 1036F TOBACCO NON-USER: CPT | Performed by: INTERNAL MEDICINE

## 2017-07-25 RX ORDER — SULFAMETHOXAZOLE AND TRIMETHOPRIM 800; 160 MG/1; MG/1
1 TABLET ORAL 2 TIMES DAILY
Qty: 20 TABLET | Refills: 0 | Status: ON HOLD | OUTPATIENT
Start: 2017-07-25 | End: 2017-08-01 | Stop reason: HOSPADM

## 2017-07-25 ASSESSMENT — ENCOUNTER SYMPTOMS
EYE REDNESS: 0
CHEST TIGHTNESS: 0
APNEA: 0
EYE PAIN: 0
SHORTNESS OF BREATH: 0
CHOKING: 0
BACK PAIN: 0
EYE DISCHARGE: 0
DIARRHEA: 0
ABDOMINAL DISTENTION: 0
COUGH: 0
ABDOMINAL PAIN: 0
BLOOD IN STOOL: 0
CONSTIPATION: 0
EYE ITCHING: 0
COLOR CHANGE: 0

## 2017-07-29 ENCOUNTER — APPOINTMENT (OUTPATIENT)
Dept: GENERAL RADIOLOGY | Age: 67
DRG: 872 | End: 2017-07-29
Payer: MEDICARE

## 2017-07-29 ENCOUNTER — APPOINTMENT (OUTPATIENT)
Dept: CT IMAGING | Age: 67
DRG: 872 | End: 2017-07-29
Payer: MEDICARE

## 2017-07-29 ENCOUNTER — HOSPITAL ENCOUNTER (INPATIENT)
Age: 67
LOS: 3 days | Discharge: HOME | DRG: 872 | End: 2017-08-01
Attending: EMERGENCY MEDICINE | Admitting: INTERNAL MEDICINE
Payer: MEDICARE

## 2017-07-29 DIAGNOSIS — A41.9 SEPSIS, DUE TO UNSPECIFIED ORGANISM: Primary | ICD-10-CM

## 2017-07-29 DIAGNOSIS — N17.9 AKI (ACUTE KIDNEY INJURY) (HCC): ICD-10-CM

## 2017-07-29 LAB
ABSOLUTE EOS #: 0.3 K/UL (ref 0–0.4)
ABSOLUTE LYMPH #: 2.2 K/UL (ref 1–4.8)
ABSOLUTE MONO #: 0.8 K/UL (ref 0.1–1.3)
ALBUMIN SERPL-MCNC: 3.6 G/DL (ref 3.5–5.2)
ALBUMIN/GLOBULIN RATIO: ABNORMAL (ref 1–2.5)
ALP BLD-CCNC: 30 U/L (ref 35–104)
ALT SERPL-CCNC: 17 U/L (ref 5–33)
AMMONIA: 28 UMOL/L (ref 11–51)
ANION GAP SERPL CALCULATED.3IONS-SCNC: 13 MMOL/L (ref 9–17)
AST SERPL-CCNC: 18 U/L
BASOPHILS # BLD: 1 %
BASOPHILS ABSOLUTE: 0.1 K/UL (ref 0–0.2)
BILIRUB SERPL-MCNC: 0.17 MG/DL (ref 0.3–1.2)
BUN BLDV-MCNC: 68 MG/DL (ref 8–23)
BUN/CREAT BLD: ABNORMAL (ref 9–20)
CALCIUM SERPL-MCNC: 8.5 MG/DL (ref 8.6–10.4)
CHLORIDE BLD-SCNC: 105 MMOL/L (ref 98–107)
CO2: 22 MMOL/L (ref 20–31)
CREAT SERPL-MCNC: 3.29 MG/DL (ref 0.5–0.9)
DIFFERENTIAL TYPE: ABNORMAL
EOSINOPHILS RELATIVE PERCENT: 3 %
GFR AFRICAN AMERICAN: 17 ML/MIN
GFR NON-AFRICAN AMERICAN: 14 ML/MIN
GFR SERPL CREATININE-BSD FRML MDRD: ABNORMAL ML/MIN/{1.73_M2}
GFR SERPL CREATININE-BSD FRML MDRD: ABNORMAL ML/MIN/{1.73_M2}
GLUCOSE BLD-MCNC: 109 MG/DL (ref 65–105)
GLUCOSE BLD-MCNC: 120 MG/DL (ref 70–99)
HCT VFR BLD CALC: 32.7 % (ref 36–46)
HEMOGLOBIN: 11 G/DL (ref 12–16)
INR BLD: 1
LACTIC ACID: 0.7 MMOL/L (ref 0.5–2.2)
LACTIC ACID: 1.4 MMOL/L (ref 0.5–2.2)
LYMPHOCYTES # BLD: 26 %
MCH RBC QN AUTO: 32.1 PG (ref 26–34)
MCHC RBC AUTO-ENTMCNC: 33.6 G/DL (ref 31–37)
MCV RBC AUTO: 95.7 FL (ref 80–100)
MONOCYTES # BLD: 10 %
MYOGLOBIN: 270 NG/ML (ref 25–58)
PARTIAL THROMBOPLASTIN TIME: 22.9 SEC (ref 23–31)
PDW BLD-RTO: 13.6 % (ref 11.5–14.9)
PLATELET # BLD: 280 K/UL (ref 150–450)
PLATELET ESTIMATE: ABNORMAL
PMV BLD AUTO: 7.3 FL (ref 6–12)
POTASSIUM SERPL-SCNC: 4.6 MMOL/L (ref 3.7–5.3)
PROTHROMBIN TIME: 10.2 SEC (ref 9.7–12)
RBC # BLD: 3.41 M/UL (ref 4–5.2)
RBC # BLD: ABNORMAL 10*6/UL
SEG NEUTROPHILS: 60 %
SEGMENTED NEUTROPHILS ABSOLUTE COUNT: 5.1 K/UL (ref 1.3–9.1)
SODIUM BLD-SCNC: 140 MMOL/L (ref 135–144)
TOTAL CK: 371 U/L (ref 26–192)
TOTAL PROTEIN: 6.2 G/DL (ref 6.4–8.3)
TROPONIN INTERP: ABNORMAL
TROPONIN T: 0.05 NG/ML
WBC # BLD: 8.4 K/UL (ref 3.5–11)
WBC # BLD: ABNORMAL 10*3/UL

## 2017-07-29 PROCEDURE — 82550 ASSAY OF CK (CPK): CPT

## 2017-07-29 PROCEDURE — 2580000003 HC RX 258: Performed by: EMERGENCY MEDICINE

## 2017-07-29 PROCEDURE — 71020 XR CHEST STANDARD TWO VW: CPT

## 2017-07-29 PROCEDURE — 96365 THER/PROPH/DIAG IV INF INIT: CPT

## 2017-07-29 PROCEDURE — 87040 BLOOD CULTURE FOR BACTERIA: CPT

## 2017-07-29 PROCEDURE — 80053 COMPREHEN METABOLIC PANEL: CPT

## 2017-07-29 PROCEDURE — 6360000002 HC RX W HCPCS: Performed by: EMERGENCY MEDICINE

## 2017-07-29 PROCEDURE — 36415 COLL VENOUS BLD VENIPUNCTURE: CPT

## 2017-07-29 PROCEDURE — 82947 ASSAY GLUCOSE BLOOD QUANT: CPT

## 2017-07-29 PROCEDURE — 80048 BASIC METABOLIC PNL TOTAL CA: CPT

## 2017-07-29 PROCEDURE — 85730 THROMBOPLASTIN TIME PARTIAL: CPT

## 2017-07-29 PROCEDURE — 70450 CT HEAD/BRAIN W/O DYE: CPT

## 2017-07-29 PROCEDURE — 83605 ASSAY OF LACTIC ACID: CPT

## 2017-07-29 PROCEDURE — 82140 ASSAY OF AMMONIA: CPT

## 2017-07-29 PROCEDURE — 99285 EMERGENCY DEPT VISIT HI MDM: CPT

## 2017-07-29 PROCEDURE — 85610 PROTHROMBIN TIME: CPT

## 2017-07-29 PROCEDURE — 2000000000 HC ICU R&B

## 2017-07-29 PROCEDURE — 2580000003 HC RX 258: Performed by: INTERNAL MEDICINE

## 2017-07-29 PROCEDURE — 84484 ASSAY OF TROPONIN QUANT: CPT

## 2017-07-29 PROCEDURE — 85025 COMPLETE CBC W/AUTO DIFF WBC: CPT

## 2017-07-29 PROCEDURE — 83874 ASSAY OF MYOGLOBIN: CPT

## 2017-07-29 RX ORDER — SODIUM CHLORIDE 0.9 % (FLUSH) 0.9 %
10 SYRINGE (ML) INJECTION PRN
Status: DISCONTINUED | OUTPATIENT
Start: 2017-07-29 | End: 2017-08-01 | Stop reason: HOSPADM

## 2017-07-29 RX ORDER — 0.9 % SODIUM CHLORIDE 0.9 %
30 INTRAVENOUS SOLUTION INTRAVENOUS ONCE
Status: DISCONTINUED | OUTPATIENT
Start: 2017-07-29 | End: 2017-08-01 | Stop reason: HOSPADM

## 2017-07-29 RX ORDER — SODIUM CHLORIDE 0.9 % (FLUSH) 0.9 %
10 SYRINGE (ML) INJECTION EVERY 12 HOURS SCHEDULED
Status: DISCONTINUED | OUTPATIENT
Start: 2017-07-29 | End: 2017-08-01 | Stop reason: HOSPADM

## 2017-07-29 RX ORDER — 0.9 % SODIUM CHLORIDE 0.9 %
1000 INTRAVENOUS SOLUTION INTRAVENOUS ONCE
Status: COMPLETED | OUTPATIENT
Start: 2017-07-29 | End: 2017-07-29

## 2017-07-29 RX ORDER — SODIUM CHLORIDE 9 MG/ML
INJECTION, SOLUTION INTRAVENOUS CONTINUOUS
Status: DISCONTINUED | OUTPATIENT
Start: 2017-07-29 | End: 2017-08-01 | Stop reason: HOSPADM

## 2017-07-29 RX ORDER — ACETAMINOPHEN 325 MG/1
650 TABLET ORAL EVERY 4 HOURS PRN
Status: DISCONTINUED | OUTPATIENT
Start: 2017-07-29 | End: 2017-08-01 | Stop reason: HOSPADM

## 2017-07-29 RX ADMIN — SODIUM CHLORIDE: 9 INJECTION, SOLUTION INTRAVENOUS at 21:58

## 2017-07-29 RX ADMIN — SODIUM CHLORIDE 1000 ML: 9 INJECTION, SOLUTION INTRAVENOUS at 18:34

## 2017-07-29 RX ADMIN — Medication 10 ML: at 21:59

## 2017-07-29 RX ADMIN — PIPERACILLIN AND TAZOBACTAM 3.38 G: 3; .375 INJECTION, POWDER, LYOPHILIZED, FOR SOLUTION INTRAVENOUS; PARENTERAL at 19:22

## 2017-07-29 ASSESSMENT — PAIN SCALES - GENERAL: PAINLEVEL_OUTOF10: 0

## 2017-07-30 PROBLEM — A41.9 SEPSIS (HCC): Status: ACTIVE | Noted: 2017-07-30

## 2017-07-30 PROBLEM — L03.90 CELLULITIS: Status: ACTIVE | Noted: 2017-07-30

## 2017-07-30 LAB
-: ABNORMAL
ABSOLUTE EOS #: 0.3 K/UL (ref 0–0.4)
ABSOLUTE LYMPH #: 2.1 K/UL (ref 1–4.8)
ABSOLUTE MONO #: 0.7 K/UL (ref 0.1–1.3)
ALBUMIN SERPL-MCNC: 3 G/DL (ref 3.5–5.2)
ALBUMIN/GLOBULIN RATIO: ABNORMAL (ref 1–2.5)
ALP BLD-CCNC: 26 U/L (ref 35–104)
ALT SERPL-CCNC: 15 U/L (ref 5–33)
AMORPHOUS: ABNORMAL
ANION GAP SERPL CALCULATED.3IONS-SCNC: 11 MMOL/L (ref 9–17)
ANION GAP SERPL CALCULATED.3IONS-SCNC: 12 MMOL/L (ref 9–17)
AST SERPL-CCNC: 14 U/L
BACTERIA: ABNORMAL
BASOPHILS # BLD: 1 %
BASOPHILS ABSOLUTE: 0.1 K/UL (ref 0–0.2)
BILIRUB SERPL-MCNC: 0.18 MG/DL (ref 0.3–1.2)
BILIRUBIN URINE: NEGATIVE
BUN BLDV-MCNC: 49 MG/DL (ref 8–23)
BUN BLDV-MCNC: 60 MG/DL (ref 8–23)
BUN/CREAT BLD: ABNORMAL (ref 9–20)
BUN/CREAT BLD: ABNORMAL (ref 9–20)
CALCIUM IONIZED: 1.14 MMOL/L (ref 1.13–1.33)
CALCIUM SERPL-MCNC: 7.6 MG/DL (ref 8.6–10.4)
CALCIUM SERPL-MCNC: 7.7 MG/DL (ref 8.6–10.4)
CASTS UA: ABNORMAL /LPF
CHLORIDE BLD-SCNC: 108 MMOL/L (ref 98–107)
CHLORIDE BLD-SCNC: 108 MMOL/L (ref 98–107)
CO2: 21 MMOL/L (ref 20–31)
CO2: 21 MMOL/L (ref 20–31)
COLOR: YELLOW
COMMENT UA: ABNORMAL
CREAT SERPL-MCNC: 1.96 MG/DL (ref 0.5–0.9)
CREAT SERPL-MCNC: 2.29 MG/DL (ref 0.5–0.9)
CREATININE URINE: 52.4 MG/DL (ref 28–217)
CRYSTALS, UA: ABNORMAL /HPF
DIFFERENTIAL TYPE: ABNORMAL
EOSINOPHIL,URINE: NORMAL
EOSINOPHILS RELATIVE PERCENT: 4 %
EPITHELIAL CELLS UA: ABNORMAL /HPF
FREE KAPPA/LAMBDA RATIO: 1.18 (ref 0.26–1.65)
GFR AFRICAN AMERICAN: 26 ML/MIN
GFR AFRICAN AMERICAN: 31 ML/MIN
GFR NON-AFRICAN AMERICAN: 21 ML/MIN
GFR NON-AFRICAN AMERICAN: 26 ML/MIN
GFR SERPL CREATININE-BSD FRML MDRD: ABNORMAL ML/MIN/{1.73_M2}
GLUCOSE BLD-MCNC: 102 MG/DL (ref 70–99)
GLUCOSE BLD-MCNC: 96 MG/DL (ref 70–99)
GLUCOSE URINE: NEGATIVE
HCT VFR BLD CALC: 32.1 % (ref 36–46)
HEMOGLOBIN: 10.6 G/DL (ref 12–16)
IRON SATURATION: 18 % (ref 20–55)
IRON: 61 UG/DL (ref 37–145)
KAPPA FREE LIGHT CHAINS QNT: 1.36 MG/DL (ref 0.37–1.94)
KETONES, URINE: NEGATIVE
LACTIC ACID: 0.8 MMOL/L (ref 0.5–2.2)
LAMBDA FREE LIGHT CHAINS QNT: 1.15 MG/DL (ref 0.57–2.63)
LEUKOCYTE ESTERASE, URINE: ABNORMAL
LYMPHOCYTES # BLD: 28 %
MCH RBC QN AUTO: 31.5 PG (ref 26–34)
MCHC RBC AUTO-ENTMCNC: 33.1 G/DL (ref 31–37)
MCV RBC AUTO: 95.2 FL (ref 80–100)
MONOCYTES # BLD: 10 %
MUCUS: ABNORMAL
MYOGLOBIN: 67 NG/ML (ref 25–58)
NITRITE, URINE: NEGATIVE
OTHER OBSERVATIONS UA: ABNORMAL
PDW BLD-RTO: 14 % (ref 11.5–14.9)
PH UA: 5.5 (ref 5–8)
PLATELET # BLD: 238 K/UL (ref 150–450)
PLATELET ESTIMATE: ABNORMAL
PMV BLD AUTO: 7.1 FL (ref 6–12)
POTASSIUM SERPL-SCNC: 4.6 MMOL/L (ref 3.7–5.3)
POTASSIUM SERPL-SCNC: 4.8 MMOL/L (ref 3.7–5.3)
PROTEIN UA: NEGATIVE
RBC # BLD: 3.37 M/UL (ref 4–5.2)
RBC # BLD: ABNORMAL 10*6/UL
RBC UA: ABNORMAL /HPF
RENAL EPITHELIAL, UA: ABNORMAL /HPF
SEG NEUTROPHILS: 57 %
SEGMENTED NEUTROPHILS ABSOLUTE COUNT: 4.5 K/UL (ref 1.3–9.1)
SODIUM BLD-SCNC: 140 MMOL/L (ref 135–144)
SODIUM BLD-SCNC: 141 MMOL/L (ref 135–144)
SODIUM,UR: 183 MMOL/L
SPECIFIC GRAVITY UA: 1.02 (ref 1–1.03)
TOTAL CK: 284 U/L (ref 26–192)
TOTAL IRON BINDING CAPACITY: 346 UG/DL (ref 250–450)
TOTAL PROTEIN: 5.3 G/DL (ref 6.4–8.3)
TRICHOMONAS: ABNORMAL
TROPONIN INTERP: ABNORMAL
TROPONIN T: 0.06 NG/ML
TURBIDITY: ABNORMAL
UNSATURATED IRON BINDING CAPACITY: 285 UG/DL (ref 112–347)
UREA NITROGEN, UR: 642 MG/DL
URINE HGB: NEGATIVE
UROBILINOGEN, URINE: NORMAL
WBC # BLD: 7.7 K/UL (ref 3.5–11)
WBC # BLD: ABNORMAL 10*3/UL
WBC UA: ABNORMAL /HPF
YEAST: ABNORMAL

## 2017-07-30 PROCEDURE — 86334 IMMUNOFIX E-PHORESIS SERUM: CPT

## 2017-07-30 PROCEDURE — 83540 ASSAY OF IRON: CPT

## 2017-07-30 PROCEDURE — 6360000002 HC RX W HCPCS: Performed by: INTERNAL MEDICINE

## 2017-07-30 PROCEDURE — 81001 URINALYSIS AUTO W/SCOPE: CPT

## 2017-07-30 PROCEDURE — S0028 INJECTION, FAMOTIDINE, 20 MG: HCPCS | Performed by: FAMILY MEDICINE

## 2017-07-30 PROCEDURE — 6370000000 HC RX 637 (ALT 250 FOR IP): Performed by: FAMILY MEDICINE

## 2017-07-30 PROCEDURE — 6360000002 HC RX W HCPCS: Performed by: FAMILY MEDICINE

## 2017-07-30 PROCEDURE — 2500000003 HC RX 250 WO HCPCS: Performed by: FAMILY MEDICINE

## 2017-07-30 PROCEDURE — 84540 ASSAY OF URINE/UREA-N: CPT

## 2017-07-30 PROCEDURE — G8978 MOBILITY CURRENT STATUS: HCPCS

## 2017-07-30 PROCEDURE — 85025 COMPLETE CBC W/AUTO DIFF WBC: CPT

## 2017-07-30 PROCEDURE — 97116 GAIT TRAINING THERAPY: CPT

## 2017-07-30 PROCEDURE — 83874 ASSAY OF MYOGLOBIN: CPT

## 2017-07-30 PROCEDURE — 87086 URINE CULTURE/COLONY COUNT: CPT

## 2017-07-30 PROCEDURE — 82330 ASSAY OF CALCIUM: CPT

## 2017-07-30 PROCEDURE — 6370000000 HC RX 637 (ALT 250 FOR IP): Performed by: NURSE PRACTITIONER

## 2017-07-30 PROCEDURE — 83605 ASSAY OF LACTIC ACID: CPT

## 2017-07-30 PROCEDURE — 36415 COLL VENOUS BLD VENIPUNCTURE: CPT

## 2017-07-30 PROCEDURE — 6370000000 HC RX 637 (ALT 250 FOR IP)

## 2017-07-30 PROCEDURE — 2580000003 HC RX 258: Performed by: FAMILY MEDICINE

## 2017-07-30 PROCEDURE — 84484 ASSAY OF TROPONIN QUANT: CPT

## 2017-07-30 PROCEDURE — 99291 CRITICAL CARE FIRST HOUR: CPT | Performed by: INTERNAL MEDICINE

## 2017-07-30 PROCEDURE — 1200000000 HC SEMI PRIVATE

## 2017-07-30 PROCEDURE — 87205 SMEAR GRAM STAIN: CPT

## 2017-07-30 PROCEDURE — 82550 ASSAY OF CK (CPK): CPT

## 2017-07-30 PROCEDURE — 83883 ASSAY NEPHELOMETRY NOT SPEC: CPT

## 2017-07-30 PROCEDURE — 97161 PT EVAL LOW COMPLEX 20 MIN: CPT

## 2017-07-30 PROCEDURE — 84300 ASSAY OF URINE SODIUM: CPT

## 2017-07-30 PROCEDURE — 2580000003 HC RX 258: Performed by: INTERNAL MEDICINE

## 2017-07-30 PROCEDURE — G8979 MOBILITY GOAL STATUS: HCPCS

## 2017-07-30 PROCEDURE — 94762 N-INVAS EAR/PLS OXIMTRY CONT: CPT

## 2017-07-30 PROCEDURE — 80053 COMPREHEN METABOLIC PANEL: CPT

## 2017-07-30 PROCEDURE — 83550 IRON BINDING TEST: CPT

## 2017-07-30 PROCEDURE — 82570 ASSAY OF URINE CREATININE: CPT

## 2017-07-30 RX ORDER — HYDROCODONE BITARTRATE AND ACETAMINOPHEN 5; 325 MG/1; MG/1
1 TABLET ORAL EVERY 6 HOURS PRN
Status: DISCONTINUED | OUTPATIENT
Start: 2017-07-30 | End: 2017-08-01 | Stop reason: HOSPADM

## 2017-07-30 RX ORDER — ATORVASTATIN CALCIUM 40 MG/1
40 TABLET, FILM COATED ORAL NIGHTLY
Status: DISCONTINUED | OUTPATIENT
Start: 2017-07-30 | End: 2017-08-01 | Stop reason: HOSPADM

## 2017-07-30 RX ORDER — CITALOPRAM 20 MG/1
10 TABLET ORAL DAILY
Status: DISCONTINUED | OUTPATIENT
Start: 2017-07-30 | End: 2017-08-01 | Stop reason: HOSPADM

## 2017-07-30 RX ORDER — HEPARIN SODIUM 5000 [USP'U]/ML
5000 INJECTION, SOLUTION INTRAVENOUS; SUBCUTANEOUS EVERY 8 HOURS SCHEDULED
Status: DISCONTINUED | OUTPATIENT
Start: 2017-07-30 | End: 2017-08-01 | Stop reason: HOSPADM

## 2017-07-30 RX ORDER — ACETAMINOPHEN 325 MG/1
TABLET ORAL
Status: COMPLETED
Start: 2017-07-30 | End: 2017-07-30

## 2017-07-30 RX ORDER — ASPIRIN 81 MG/1
81 TABLET, CHEWABLE ORAL DAILY
Status: DISCONTINUED | OUTPATIENT
Start: 2017-07-30 | End: 2017-08-01 | Stop reason: HOSPADM

## 2017-07-30 RX ORDER — DOXYCYCLINE 100 MG/1
100 CAPSULE ORAL EVERY 12 HOURS SCHEDULED
Status: DISCONTINUED | OUTPATIENT
Start: 2017-07-30 | End: 2017-08-01 | Stop reason: HOSPADM

## 2017-07-30 RX ORDER — FENOFIBRATE 160 MG/1
160 TABLET ORAL DAILY
Status: DISCONTINUED | OUTPATIENT
Start: 2017-07-30 | End: 2017-08-01 | Stop reason: HOSPADM

## 2017-07-30 RX ORDER — AMLODIPINE BESYLATE 5 MG/1
5 TABLET ORAL DAILY
Status: DISCONTINUED | OUTPATIENT
Start: 2017-07-30 | End: 2017-08-01 | Stop reason: HOSPADM

## 2017-07-30 RX ORDER — ATORVASTATIN CALCIUM 40 MG/1
80 TABLET, FILM COATED ORAL NIGHTLY
Status: DISCONTINUED | OUTPATIENT
Start: 2017-07-30 | End: 2017-07-30

## 2017-07-30 RX ORDER — NITROGLYCERIN 0.4 MG/1
0.4 TABLET SUBLINGUAL EVERY 5 MIN PRN
Status: DISCONTINUED | OUTPATIENT
Start: 2017-07-30 | End: 2017-08-01 | Stop reason: HOSPADM

## 2017-07-30 RX ORDER — CALCIUM CARBONATE/VITAMIN D3 600 MG-10
1 TABLET ORAL 2 TIMES DAILY
Status: DISCONTINUED | OUTPATIENT
Start: 2017-07-30 | End: 2017-08-01 | Stop reason: HOSPADM

## 2017-07-30 RX ADMIN — HYDROCODONE BITARTRATE AND ACETAMINOPHEN 1 TABLET: 5; 325 TABLET ORAL at 18:13

## 2017-07-30 RX ADMIN — HEPARIN SODIUM 5000 UNITS: 5000 INJECTION, SOLUTION INTRAVENOUS; SUBCUTANEOUS at 20:58

## 2017-07-30 RX ADMIN — ATORVASTATIN CALCIUM 40 MG: 40 TABLET, FILM COATED ORAL at 20:58

## 2017-07-30 RX ADMIN — DOXYCYCLINE 100 MG: 100 CAPSULE ORAL at 20:58

## 2017-07-30 RX ADMIN — SODIUM CHLORIDE: 9 INJECTION, SOLUTION INTRAVENOUS at 04:00

## 2017-07-30 RX ADMIN — Medication 1 TABLET: at 09:59

## 2017-07-30 RX ADMIN — HYDROCODONE BITARTRATE AND ACETAMINOPHEN 1 TABLET: 5; 325 TABLET ORAL at 09:59

## 2017-07-30 RX ADMIN — FAMOTIDINE 10 MG: 10 INJECTION, SOLUTION INTRAVENOUS at 20:58

## 2017-07-30 RX ADMIN — FENOFIBRATE 160 MG: 160 TABLET ORAL at 09:00

## 2017-07-30 RX ADMIN — Medication 1 TABLET: at 20:58

## 2017-07-30 RX ADMIN — FAMOTIDINE 10 MG: 10 INJECTION, SOLUTION INTRAVENOUS at 10:00

## 2017-07-30 RX ADMIN — CITALOPRAM HYDROBROMIDE 10 MG: 20 TABLET ORAL at 09:59

## 2017-07-30 RX ADMIN — PIPERACILLIN SODIUM,TAZOBACTAM SODIUM 2.25 G: 3; .375 INJECTION, POWDER, FOR SOLUTION INTRAVENOUS at 03:56

## 2017-07-30 RX ADMIN — AMLODIPINE BESYLATE 5 MG: 5 TABLET ORAL at 18:13

## 2017-07-30 RX ADMIN — PIPERACILLIN, TAZOBACTAM 3.38 G: 3; .375 INJECTION, POWDER, LYOPHILIZED, FOR SOLUTION INTRAVENOUS at 12:25

## 2017-07-30 RX ADMIN — Medication 10 ML: at 09:00

## 2017-07-30 RX ADMIN — ASPIRIN 81 MG 81 MG: 81 TABLET ORAL at 09:59

## 2017-07-30 RX ADMIN — HEPARIN SODIUM 5000 UNITS: 5000 INJECTION, SOLUTION INTRAVENOUS; SUBCUTANEOUS at 10:00

## 2017-07-30 RX ADMIN — VITAMIN D, TAB 1000IU (100/BT) 1000 UNITS: 25 TAB at 09:59

## 2017-07-30 RX ADMIN — ACETAMINOPHEN 650 MG: 325 TABLET ORAL at 01:15

## 2017-07-30 RX ADMIN — HEPARIN SODIUM 5000 UNITS: 5000 INJECTION, SOLUTION INTRAVENOUS; SUBCUTANEOUS at 15:42

## 2017-07-30 RX ADMIN — SODIUM CHLORIDE: 9 INJECTION, SOLUTION INTRAVENOUS at 08:23

## 2017-07-30 ASSESSMENT — PAIN DESCRIPTION - PROGRESSION: CLINICAL_PROGRESSION: GRADUALLY IMPROVING

## 2017-07-30 ASSESSMENT — PAIN DESCRIPTION - ONSET: ONSET: ON-GOING

## 2017-07-30 ASSESSMENT — ENCOUNTER SYMPTOMS
EYE REDNESS: 0
RHINORRHEA: 0
CHEST TIGHTNESS: 0
EYE DISCHARGE: 0
COUGH: 0
DIARRHEA: 0
VOMITING: 0
ABDOMINAL PAIN: 0
SHORTNESS OF BREATH: 0
SORE THROAT: 0
NAUSEA: 0

## 2017-07-30 ASSESSMENT — PAIN SCALES - GENERAL
PAINLEVEL_OUTOF10: 2
PAINLEVEL_OUTOF10: 1
PAINLEVEL_OUTOF10: 3
PAINLEVEL_OUTOF10: 0
PAINLEVEL_OUTOF10: 8
PAINLEVEL_OUTOF10: 1

## 2017-07-30 ASSESSMENT — PAIN DESCRIPTION - LOCATION
LOCATION: BACK
LOCATION: BACK

## 2017-07-30 ASSESSMENT — PAIN DESCRIPTION - ORIENTATION: ORIENTATION: LOWER

## 2017-07-30 ASSESSMENT — PAIN DESCRIPTION - FREQUENCY: FREQUENCY: INTERMITTENT

## 2017-07-30 ASSESSMENT — PAIN DESCRIPTION - DESCRIPTORS: DESCRIPTORS: ACHING

## 2017-07-30 ASSESSMENT — PAIN DESCRIPTION - PAIN TYPE
TYPE: CHRONIC PAIN
TYPE: CHRONIC PAIN

## 2017-07-31 ENCOUNTER — APPOINTMENT (OUTPATIENT)
Dept: ULTRASOUND IMAGING | Age: 67
DRG: 872 | End: 2017-07-31
Payer: MEDICARE

## 2017-07-31 LAB
ABSOLUTE EOS #: 0.3 K/UL (ref 0–0.4)
ABSOLUTE LYMPH #: 2.4 K/UL (ref 1–4.8)
ABSOLUTE MONO #: 0.8 K/UL (ref 0.1–1.3)
ANION GAP SERPL CALCULATED.3IONS-SCNC: 14 MMOL/L (ref 9–17)
BASOPHILS # BLD: 1 %
BASOPHILS ABSOLUTE: 0.1 K/UL (ref 0–0.2)
BUN BLDV-MCNC: 20 MG/DL (ref 8–23)
BUN/CREAT BLD: ABNORMAL (ref 9–20)
CALCIUM SERPL-MCNC: 8.4 MG/DL (ref 8.6–10.4)
CHLORIDE BLD-SCNC: 108 MMOL/L (ref 98–107)
CO2: 19 MMOL/L (ref 20–31)
CREAT SERPL-MCNC: 0.94 MG/DL (ref 0.5–0.9)
CULTURE: NO GROWTH
CULTURE: NORMAL
DIFFERENTIAL TYPE: ABNORMAL
EOSINOPHILS RELATIVE PERCENT: 4 %
GFR AFRICAN AMERICAN: >60 ML/MIN
GFR NON-AFRICAN AMERICAN: 60 ML/MIN
GFR SERPL CREATININE-BSD FRML MDRD: ABNORMAL ML/MIN/{1.73_M2}
GFR SERPL CREATININE-BSD FRML MDRD: ABNORMAL ML/MIN/{1.73_M2}
GLUCOSE BLD-MCNC: 100 MG/DL (ref 70–99)
HCT VFR BLD CALC: 36 % (ref 36–46)
HEMOGLOBIN: 12 G/DL (ref 12–16)
LYMPHOCYTES # BLD: 27 %
Lab: NORMAL
MAGNESIUM: 2.1 MG/DL (ref 1.6–2.6)
MCH RBC QN AUTO: 31.6 PG (ref 26–34)
MCHC RBC AUTO-ENTMCNC: 33.2 G/DL (ref 31–37)
MCV RBC AUTO: 95.2 FL (ref 80–100)
MONOCYTES # BLD: 9 %
PDW BLD-RTO: 13.9 % (ref 11.5–14.9)
PHOSPHORUS: 2.7 MG/DL (ref 2.6–4.5)
PLATELET # BLD: 293 K/UL (ref 150–450)
PLATELET ESTIMATE: ABNORMAL
PMV BLD AUTO: 7.3 FL (ref 6–12)
POTASSIUM SERPL-SCNC: 4.6 MMOL/L (ref 3.7–5.3)
RBC # BLD: 3.79 M/UL (ref 4–5.2)
RBC # BLD: ABNORMAL 10*6/UL
SEG NEUTROPHILS: 59 %
SEGMENTED NEUTROPHILS ABSOLUTE COUNT: 5.2 K/UL (ref 1.3–9.1)
SODIUM BLD-SCNC: 141 MMOL/L (ref 135–144)
SPECIMEN DESCRIPTION: NORMAL
SPECIMEN DESCRIPTION: NORMAL
STATUS: NORMAL
WBC # BLD: 8.8 K/UL (ref 3.5–11)
WBC # BLD: ABNORMAL 10*3/UL

## 2017-07-31 PROCEDURE — 6370000000 HC RX 637 (ALT 250 FOR IP): Performed by: STUDENT IN AN ORGANIZED HEALTH CARE EDUCATION/TRAINING PROGRAM

## 2017-07-31 PROCEDURE — 6360000002 HC RX W HCPCS: Performed by: FAMILY MEDICINE

## 2017-07-31 PROCEDURE — 2500000003 HC RX 250 WO HCPCS: Performed by: FAMILY MEDICINE

## 2017-07-31 PROCEDURE — 2580000003 HC RX 258: Performed by: INTERNAL MEDICINE

## 2017-07-31 PROCEDURE — 85025 COMPLETE CBC W/AUTO DIFF WBC: CPT

## 2017-07-31 PROCEDURE — 6370000000 HC RX 637 (ALT 250 FOR IP): Performed by: NURSE PRACTITIONER

## 2017-07-31 PROCEDURE — 76770 US EXAM ABDO BACK WALL COMP: CPT

## 2017-07-31 PROCEDURE — 84100 ASSAY OF PHOSPHORUS: CPT

## 2017-07-31 PROCEDURE — 97110 THERAPEUTIC EXERCISES: CPT

## 2017-07-31 PROCEDURE — 1200000000 HC SEMI PRIVATE

## 2017-07-31 PROCEDURE — 36415 COLL VENOUS BLD VENIPUNCTURE: CPT

## 2017-07-31 PROCEDURE — 83735 ASSAY OF MAGNESIUM: CPT

## 2017-07-31 PROCEDURE — S0028 INJECTION, FAMOTIDINE, 20 MG: HCPCS | Performed by: FAMILY MEDICINE

## 2017-07-31 PROCEDURE — 97116 GAIT TRAINING THERAPY: CPT

## 2017-07-31 PROCEDURE — 6370000000 HC RX 637 (ALT 250 FOR IP): Performed by: FAMILY MEDICINE

## 2017-07-31 PROCEDURE — 80048 BASIC METABOLIC PNL TOTAL CA: CPT

## 2017-07-31 PROCEDURE — 99233 SBSQ HOSP IP/OBS HIGH 50: CPT | Performed by: INTERNAL MEDICINE

## 2017-07-31 RX ORDER — CEPHALEXIN 250 MG/1
250 CAPSULE ORAL EVERY 6 HOURS SCHEDULED
Status: DISCONTINUED | OUTPATIENT
Start: 2017-07-31 | End: 2017-08-01 | Stop reason: HOSPADM

## 2017-07-31 RX ADMIN — Medication 1 TABLET: at 21:54

## 2017-07-31 RX ADMIN — DOXYCYCLINE 100 MG: 100 CAPSULE ORAL at 08:04

## 2017-07-31 RX ADMIN — Medication 1 TABLET: at 08:05

## 2017-07-31 RX ADMIN — AMLODIPINE BESYLATE 5 MG: 5 TABLET ORAL at 08:04

## 2017-07-31 RX ADMIN — HYDROCODONE BITARTRATE AND ACETAMINOPHEN 1 TABLET: 5; 325 TABLET ORAL at 01:31

## 2017-07-31 RX ADMIN — FAMOTIDINE 10 MG: 10 INJECTION, SOLUTION INTRAVENOUS at 21:55

## 2017-07-31 RX ADMIN — VITAMIN D, TAB 1000IU (100/BT) 1000 UNITS: 25 TAB at 08:05

## 2017-07-31 RX ADMIN — HYDROCODONE BITARTRATE AND ACETAMINOPHEN 1 TABLET: 5; 325 TABLET ORAL at 19:32

## 2017-07-31 RX ADMIN — HEPARIN SODIUM 5000 UNITS: 5000 INJECTION, SOLUTION INTRAVENOUS; SUBCUTANEOUS at 05:26

## 2017-07-31 RX ADMIN — HYDROCODONE BITARTRATE AND ACETAMINOPHEN 1 TABLET: 5; 325 TABLET ORAL at 12:36

## 2017-07-31 RX ADMIN — HEPARIN SODIUM 5000 UNITS: 5000 INJECTION, SOLUTION INTRAVENOUS; SUBCUTANEOUS at 15:05

## 2017-07-31 RX ADMIN — CITALOPRAM HYDROBROMIDE 10 MG: 20 TABLET ORAL at 08:05

## 2017-07-31 RX ADMIN — DOXYCYCLINE 100 MG: 100 CAPSULE ORAL at 21:54

## 2017-07-31 RX ADMIN — FENOFIBRATE 160 MG: 160 TABLET ORAL at 11:56

## 2017-07-31 RX ADMIN — ASPIRIN 81 MG 81 MG: 81 TABLET ORAL at 08:05

## 2017-07-31 RX ADMIN — HEPARIN SODIUM 5000 UNITS: 5000 INJECTION, SOLUTION INTRAVENOUS; SUBCUTANEOUS at 21:57

## 2017-07-31 RX ADMIN — ATORVASTATIN CALCIUM 40 MG: 40 TABLET, FILM COATED ORAL at 21:54

## 2017-07-31 RX ADMIN — Medication 10 ML: at 08:06

## 2017-07-31 RX ADMIN — FAMOTIDINE 10 MG: 10 INJECTION, SOLUTION INTRAVENOUS at 08:07

## 2017-07-31 RX ADMIN — CEPHALEXIN 250 MG: 250 CAPSULE ORAL at 15:04

## 2017-07-31 ASSESSMENT — ENCOUNTER SYMPTOMS
DIARRHEA: 1
CHEST TIGHTNESS: 0
WHEEZING: 0
ABDOMINAL PAIN: 0
CONSTIPATION: 0
BACK PAIN: 0
SHORTNESS OF BREATH: 0

## 2017-07-31 ASSESSMENT — PAIN SCALES - GENERAL
PAINLEVEL_OUTOF10: 8
PAINLEVEL_OUTOF10: 5
PAINLEVEL_OUTOF10: 7
PAINLEVEL_OUTOF10: 0

## 2017-07-31 ASSESSMENT — PAIN DESCRIPTION - ORIENTATION
ORIENTATION: LOWER
ORIENTATION: LOWER

## 2017-07-31 ASSESSMENT — PAIN DESCRIPTION - LOCATION
LOCATION: BACK;HIP
LOCATION: BACK

## 2017-07-31 ASSESSMENT — PAIN DESCRIPTION - PAIN TYPE
TYPE: CHRONIC PAIN
TYPE: CHRONIC PAIN

## 2017-07-31 ASSESSMENT — PAIN DESCRIPTION - DESCRIPTORS: DESCRIPTORS: ACHING;BURNING

## 2017-08-01 VITALS
OXYGEN SATURATION: 94 % | DIASTOLIC BLOOD PRESSURE: 64 MMHG | TEMPERATURE: 98.2 F | HEIGHT: 64 IN | SYSTOLIC BLOOD PRESSURE: 166 MMHG | RESPIRATION RATE: 16 BRPM | BODY MASS INDEX: 31.69 KG/M2 | WEIGHT: 185.63 LBS | HEART RATE: 65 BPM

## 2017-08-01 LAB
ABSOLUTE EOS #: 0.3 K/UL (ref 0–0.4)
ABSOLUTE LYMPH #: 2.5 K/UL (ref 1–4.8)
ABSOLUTE MONO #: 0.6 K/UL (ref 0.1–1.3)
ANION GAP SERPL CALCULATED.3IONS-SCNC: 13 MMOL/L (ref 9–17)
BASOPHILS # BLD: 1 %
BASOPHILS ABSOLUTE: 0.1 K/UL (ref 0–0.2)
BUN BLDV-MCNC: 12 MG/DL (ref 8–23)
BUN/CREAT BLD: ABNORMAL (ref 9–20)
CALCIUM SERPL-MCNC: 9.1 MG/DL (ref 8.6–10.4)
CHLORIDE BLD-SCNC: 105 MMOL/L (ref 98–107)
CO2: 23 MMOL/L (ref 20–31)
CREAT SERPL-MCNC: 0.89 MG/DL (ref 0.5–0.9)
DIFFERENTIAL TYPE: ABNORMAL
EOSINOPHILS RELATIVE PERCENT: 4 %
GFR AFRICAN AMERICAN: >60 ML/MIN
GFR NON-AFRICAN AMERICAN: >60 ML/MIN
GFR SERPL CREATININE-BSD FRML MDRD: ABNORMAL ML/MIN/{1.73_M2}
GFR SERPL CREATININE-BSD FRML MDRD: ABNORMAL ML/MIN/{1.73_M2}
GLUCOSE BLD-MCNC: 104 MG/DL (ref 70–99)
HCT VFR BLD CALC: 37 % (ref 36–46)
HEMOGLOBIN: 12.4 G/DL (ref 12–16)
LYMPHOCYTES # BLD: 33 %
MAGNESIUM: 2 MG/DL (ref 1.6–2.6)
MCH RBC QN AUTO: 31.4 PG (ref 26–34)
MCHC RBC AUTO-ENTMCNC: 33.5 G/DL (ref 31–37)
MCV RBC AUTO: 93.8 FL (ref 80–100)
MONOCYTES # BLD: 8 %
PDW BLD-RTO: 13.8 % (ref 11.5–14.9)
PHOSPHORUS: 3.7 MG/DL (ref 2.6–4.5)
PLATELET # BLD: 333 K/UL (ref 150–450)
PLATELET ESTIMATE: ABNORMAL
PMV BLD AUTO: 7.3 FL (ref 6–12)
POTASSIUM SERPL-SCNC: 4.4 MMOL/L (ref 3.7–5.3)
RBC # BLD: 3.94 M/UL (ref 4–5.2)
RBC # BLD: ABNORMAL 10*6/UL
SEG NEUTROPHILS: 54 %
SEGMENTED NEUTROPHILS ABSOLUTE COUNT: 4 K/UL (ref 1.3–9.1)
SODIUM BLD-SCNC: 141 MMOL/L (ref 135–144)
WBC # BLD: 7.4 K/UL (ref 3.5–11)
WBC # BLD: ABNORMAL 10*3/UL

## 2017-08-01 PROCEDURE — 80048 BASIC METABOLIC PNL TOTAL CA: CPT

## 2017-08-01 PROCEDURE — 6370000000 HC RX 637 (ALT 250 FOR IP): Performed by: STUDENT IN AN ORGANIZED HEALTH CARE EDUCATION/TRAINING PROGRAM

## 2017-08-01 PROCEDURE — 83735 ASSAY OF MAGNESIUM: CPT

## 2017-08-01 PROCEDURE — 2500000003 HC RX 250 WO HCPCS: Performed by: FAMILY MEDICINE

## 2017-08-01 PROCEDURE — 85025 COMPLETE CBC W/AUTO DIFF WBC: CPT

## 2017-08-01 PROCEDURE — S0028 INJECTION, FAMOTIDINE, 20 MG: HCPCS | Performed by: FAMILY MEDICINE

## 2017-08-01 PROCEDURE — 6360000002 HC RX W HCPCS: Performed by: FAMILY MEDICINE

## 2017-08-01 PROCEDURE — 6370000000 HC RX 637 (ALT 250 FOR IP): Performed by: RADIOLOGY

## 2017-08-01 PROCEDURE — 6370000000 HC RX 637 (ALT 250 FOR IP): Performed by: FAMILY MEDICINE

## 2017-08-01 PROCEDURE — 99233 SBSQ HOSP IP/OBS HIGH 50: CPT | Performed by: INTERNAL MEDICINE

## 2017-08-01 PROCEDURE — 84100 ASSAY OF PHOSPHORUS: CPT

## 2017-08-01 PROCEDURE — 36415 COLL VENOUS BLD VENIPUNCTURE: CPT

## 2017-08-01 PROCEDURE — 2580000003 HC RX 258: Performed by: INTERNAL MEDICINE

## 2017-08-01 RX ORDER — CITALOPRAM 10 MG/1
10 TABLET ORAL DAILY
Qty: 30 TABLET | Refills: 3 | Status: SHIPPED | OUTPATIENT
Start: 2017-08-01 | End: 2018-01-08 | Stop reason: SDUPTHER

## 2017-08-01 RX ORDER — DOXYCYCLINE 100 MG/1
100 CAPSULE ORAL EVERY 12 HOURS SCHEDULED
Qty: 14 CAPSULE | Refills: 0 | Status: ON HOLD | OUTPATIENT
Start: 2017-08-01 | End: 2017-08-31

## 2017-08-01 RX ORDER — CEPHALEXIN 500 MG/1
250 CAPSULE ORAL 4 TIMES DAILY
Qty: 30 CAPSULE | Refills: 0 | Status: SHIPPED | OUTPATIENT
Start: 2017-08-01 | End: 2017-08-15 | Stop reason: ALTCHOICE

## 2017-08-01 RX ORDER — LISINOPRIL 20 MG/1
20 TABLET ORAL DAILY
Status: DISCONTINUED | OUTPATIENT
Start: 2017-08-01 | End: 2017-08-01 | Stop reason: HOSPADM

## 2017-08-01 RX ORDER — AMLODIPINE BESYLATE 5 MG/1
5 TABLET ORAL DAILY
Qty: 30 TABLET | Refills: 3 | Status: SHIPPED | OUTPATIENT
Start: 2017-08-01 | End: 2017-10-09

## 2017-08-01 RX ADMIN — CEPHALEXIN 250 MG: 250 CAPSULE ORAL at 05:41

## 2017-08-01 RX ADMIN — LISINOPRIL 20 MG: 20 TABLET ORAL at 09:20

## 2017-08-01 RX ADMIN — CEPHALEXIN 250 MG: 250 CAPSULE ORAL at 12:34

## 2017-08-01 RX ADMIN — AMLODIPINE BESYLATE 5 MG: 5 TABLET ORAL at 09:20

## 2017-08-01 RX ADMIN — CITALOPRAM HYDROBROMIDE 10 MG: 20 TABLET ORAL at 09:20

## 2017-08-01 RX ADMIN — FENOFIBRATE 160 MG: 160 TABLET ORAL at 09:22

## 2017-08-01 RX ADMIN — VITAMIN D, TAB 1000IU (100/BT) 1000 UNITS: 25 TAB at 09:20

## 2017-08-01 RX ADMIN — HYDROCODONE BITARTRATE AND ACETAMINOPHEN 1 TABLET: 5; 325 TABLET ORAL at 03:33

## 2017-08-01 RX ADMIN — DOXYCYCLINE 100 MG: 100 CAPSULE ORAL at 09:20

## 2017-08-01 RX ADMIN — Medication 1 TABLET: at 09:20

## 2017-08-01 RX ADMIN — CEPHALEXIN 250 MG: 250 CAPSULE ORAL at 00:13

## 2017-08-01 RX ADMIN — SODIUM CHLORIDE: 9 INJECTION, SOLUTION INTRAVENOUS at 08:20

## 2017-08-01 RX ADMIN — HEPARIN SODIUM 5000 UNITS: 5000 INJECTION, SOLUTION INTRAVENOUS; SUBCUTANEOUS at 05:42

## 2017-08-01 RX ADMIN — FAMOTIDINE 10 MG: 10 INJECTION, SOLUTION INTRAVENOUS at 09:21

## 2017-08-01 RX ADMIN — HYDROCODONE BITARTRATE AND ACETAMINOPHEN 1 TABLET: 5; 325 TABLET ORAL at 09:29

## 2017-08-01 RX ADMIN — ASPIRIN 81 MG 81 MG: 81 TABLET ORAL at 09:20

## 2017-08-01 ASSESSMENT — ENCOUNTER SYMPTOMS
BACK PAIN: 0
ABDOMINAL PAIN: 0
CONSTIPATION: 0
CHEST TIGHTNESS: 0
WHEEZING: 0
DIARRHEA: 1
SHORTNESS OF BREATH: 0

## 2017-08-01 ASSESSMENT — PAIN SCALES - GENERAL
PAINLEVEL_OUTOF10: 3
PAINLEVEL_OUTOF10: 0
PAINLEVEL_OUTOF10: 5
PAINLEVEL_OUTOF10: 7

## 2017-08-01 ASSESSMENT — PAIN DESCRIPTION - PAIN TYPE: TYPE: CHRONIC PAIN

## 2017-08-01 ASSESSMENT — PAIN DESCRIPTION - LOCATION: LOCATION: BACK;HIP

## 2017-08-01 ASSESSMENT — PAIN DESCRIPTION - ORIENTATION: ORIENTATION: LOWER

## 2017-08-01 ASSESSMENT — PAIN DESCRIPTION - DESCRIPTORS: DESCRIPTORS: ACHING;BURNING

## 2017-08-02 ENCOUNTER — CARE COORDINATION (OUTPATIENT)
Dept: CASE MANAGEMENT | Age: 67
End: 2017-08-02

## 2017-08-02 ENCOUNTER — TELEPHONE (OUTPATIENT)
Dept: PHARMACY | Facility: CLINIC | Age: 67
End: 2017-08-02

## 2017-08-02 LAB
PATHOLOGIST: NORMAL
SERUM IFX INTERP: NORMAL

## 2017-08-03 ENCOUNTER — OFFICE VISIT (OUTPATIENT)
Dept: INTERNAL MEDICINE CLINIC | Age: 67
End: 2017-08-03
Payer: MEDICARE

## 2017-08-03 VITALS
SYSTOLIC BLOOD PRESSURE: 150 MMHG | HEIGHT: 64 IN | DIASTOLIC BLOOD PRESSURE: 70 MMHG | WEIGHT: 174 LBS | BODY MASS INDEX: 29.71 KG/M2

## 2017-08-03 DIAGNOSIS — I10 ESSENTIAL HYPERTENSION: ICD-10-CM

## 2017-08-03 DIAGNOSIS — N17.9 AKI (ACUTE KIDNEY INJURY) (HCC): ICD-10-CM

## 2017-08-03 DIAGNOSIS — A41.9 SEPSIS, DUE TO UNSPECIFIED ORGANISM: ICD-10-CM

## 2017-08-03 DIAGNOSIS — L03.116 CELLULITIS OF LEFT LOWER EXTREMITY: Primary | ICD-10-CM

## 2017-08-03 PROCEDURE — 99496 TRANSJ CARE MGMT HIGH F2F 7D: CPT | Performed by: INTERNAL MEDICINE

## 2017-08-03 RX ORDER — LISINOPRIL 20 MG/1
30 TABLET ORAL DAILY
Qty: 90 TABLET | Refills: 3 | Status: SHIPPED | OUTPATIENT
Start: 2017-08-03 | End: 2017-10-09 | Stop reason: DRUGHIGH

## 2017-08-03 ASSESSMENT — ENCOUNTER SYMPTOMS
CHEST TIGHTNESS: 0
APNEA: 0
ABDOMINAL PAIN: 0
NAUSEA: 0
BLOOD IN STOOL: 0
EYE DISCHARGE: 0
COUGH: 0
CHOKING: 0
COLOR CHANGE: 1
RHINORRHEA: 0
WHEEZING: 0
BACK PAIN: 0
PHOTOPHOBIA: 0
SORE THROAT: 0
FACIAL SWELLING: 0
ABDOMINAL DISTENTION: 0
EYE REDNESS: 0
DIARRHEA: 0
EYE PAIN: 0
TROUBLE SWALLOWING: 0
EYE ITCHING: 0
STRIDOR: 0
ANAL BLEEDING: 0
VOICE CHANGE: 0
VOMITING: 0
SINUS PRESSURE: 0
SHORTNESS OF BREATH: 0
RECTAL PAIN: 0
CONSTIPATION: 0

## 2017-08-04 ENCOUNTER — HOSPITAL ENCOUNTER (OUTPATIENT)
Dept: WOMENS IMAGING | Age: 67
Discharge: HOME | End: 2017-08-04
Payer: MEDICARE

## 2017-08-04 ENCOUNTER — CARE COORDINATION (OUTPATIENT)
Dept: CASE MANAGEMENT | Age: 67
End: 2017-08-04

## 2017-08-04 DIAGNOSIS — Z12.31 ENCOUNTER FOR SCREENING MAMMOGRAM FOR MALIGNANT NEOPLASM OF BREAST: ICD-10-CM

## 2017-08-04 DIAGNOSIS — Z13.9 SCREENING FOR CONDITION: ICD-10-CM

## 2017-08-04 LAB
CULTURE: NORMAL
Lab: NORMAL
SPECIMEN DESCRIPTION: NORMAL
STATUS: NORMAL
STATUS: NORMAL

## 2017-08-04 PROCEDURE — G0202 SCR MAMMO BI INCL CAD: HCPCS

## 2017-08-08 ENCOUNTER — CARE COORDINATION (OUTPATIENT)
Dept: CASE MANAGEMENT | Age: 67
End: 2017-08-08

## 2017-08-15 ENCOUNTER — HOSPITAL ENCOUNTER (OUTPATIENT)
Dept: PAIN MANAGEMENT | Age: 67
Discharge: HOME | End: 2017-08-15
Payer: MEDICARE

## 2017-08-15 DIAGNOSIS — Z51.81 MEDICATION MONITORING ENCOUNTER: ICD-10-CM

## 2017-08-15 DIAGNOSIS — M47.816 SPONDYLOSIS OF LUMBAR REGION WITHOUT MYELOPATHY OR RADICULOPATHY: Primary | ICD-10-CM

## 2017-08-15 DIAGNOSIS — M47.816 FACET ARTHRITIS OF LUMBAR REGION: ICD-10-CM

## 2017-08-15 DIAGNOSIS — M51.36 LUMBAR DEGENERATIVE DISC DISEASE: ICD-10-CM

## 2017-08-15 PROCEDURE — 99214 OFFICE O/P EST MOD 30 MIN: CPT | Performed by: NURSE PRACTITIONER

## 2017-08-15 PROCEDURE — 99214 OFFICE O/P EST MOD 30 MIN: CPT

## 2017-08-15 RX ORDER — HYDROCODONE BITARTRATE AND ACETAMINOPHEN 5; 325 MG/1; MG/1
1 TABLET ORAL EVERY 12 HOURS PRN
Qty: 60 TABLET | Refills: 0 | Status: SHIPPED | OUTPATIENT
Start: 2017-08-19 | End: 2017-09-18 | Stop reason: SDUPTHER

## 2017-08-15 ASSESSMENT — ENCOUNTER SYMPTOMS
BACK PAIN: 1
SHORTNESS OF BREATH: 0
COUGH: 0
CONSTIPATION: 0

## 2017-08-18 ENCOUNTER — CARE COORDINATION (OUTPATIENT)
Dept: CASE MANAGEMENT | Age: 67
End: 2017-08-18

## 2017-08-30 ENCOUNTER — OFFICE VISIT (OUTPATIENT)
Dept: INTERNAL MEDICINE CLINIC | Age: 67
End: 2017-08-30
Payer: MEDICARE

## 2017-08-30 ENCOUNTER — TELEPHONE (OUTPATIENT)
Dept: INTERNAL MEDICINE CLINIC | Age: 67
End: 2017-08-30

## 2017-08-30 VITALS
DIASTOLIC BLOOD PRESSURE: 82 MMHG | SYSTOLIC BLOOD PRESSURE: 130 MMHG | BODY MASS INDEX: 30.56 KG/M2 | HEIGHT: 64 IN | WEIGHT: 179 LBS

## 2017-08-30 DIAGNOSIS — I10 ESSENTIAL HYPERTENSION: Primary | ICD-10-CM

## 2017-08-30 DIAGNOSIS — I82.4Z3 ACUTE EMBOLISM AND THROMBOSIS OF DEEP VEIN OF BOTH DISTAL LOWER EXTREMITIES (HCC): ICD-10-CM

## 2017-08-30 DIAGNOSIS — E78.2 MIXED HYPERLIPIDEMIA: ICD-10-CM

## 2017-08-30 DIAGNOSIS — I82.402 DEEP VEIN THROMBOSIS (DVT) OF LEFT LOWER EXTREMITY, UNSPECIFIED CHRONICITY, UNSPECIFIED VEIN (HCC): ICD-10-CM

## 2017-08-30 DIAGNOSIS — N17.9 AKI (ACUTE KIDNEY INJURY) (HCC): ICD-10-CM

## 2017-08-30 PROCEDURE — 4040F PNEUMOC VAC/ADMIN/RCVD: CPT | Performed by: INTERNAL MEDICINE

## 2017-08-30 PROCEDURE — 1111F DSCHRG MED/CURRENT MED MERGE: CPT | Performed by: INTERNAL MEDICINE

## 2017-08-30 PROCEDURE — G8417 CALC BMI ABV UP PARAM F/U: HCPCS | Performed by: INTERNAL MEDICINE

## 2017-08-30 PROCEDURE — G8427 DOCREV CUR MEDS BY ELIG CLIN: HCPCS | Performed by: INTERNAL MEDICINE

## 2017-08-30 PROCEDURE — G8598 ASA/ANTIPLAT THER USED: HCPCS | Performed by: INTERNAL MEDICINE

## 2017-08-30 PROCEDURE — 3017F COLORECTAL CA SCREEN DOC REV: CPT | Performed by: INTERNAL MEDICINE

## 2017-08-30 PROCEDURE — 99214 OFFICE O/P EST MOD 30 MIN: CPT | Performed by: INTERNAL MEDICINE

## 2017-08-30 PROCEDURE — G8399 PT W/DXA RESULTS DOCUMENT: HCPCS | Performed by: INTERNAL MEDICINE

## 2017-08-30 PROCEDURE — 1123F ACP DISCUSS/DSCN MKR DOCD: CPT | Performed by: INTERNAL MEDICINE

## 2017-08-30 PROCEDURE — 1090F PRES/ABSN URINE INCON ASSESS: CPT | Performed by: INTERNAL MEDICINE

## 2017-08-30 PROCEDURE — 1036F TOBACCO NON-USER: CPT | Performed by: INTERNAL MEDICINE

## 2017-08-30 PROCEDURE — 3014F SCREEN MAMMO DOC REV: CPT | Performed by: INTERNAL MEDICINE

## 2017-08-30 RX ORDER — CLINDAMYCIN HYDROCHLORIDE 300 MG/1
300 CAPSULE ORAL 3 TIMES DAILY
Qty: 45 CAPSULE | Refills: 0 | Status: ON HOLD | OUTPATIENT
Start: 2017-08-30 | End: 2017-08-31

## 2017-08-30 ASSESSMENT — ENCOUNTER SYMPTOMS
GASTROINTESTINAL NEGATIVE: 1
RESPIRATORY NEGATIVE: 1
EYES NEGATIVE: 1

## 2017-08-31 ENCOUNTER — HOSPITAL ENCOUNTER (OUTPATIENT)
Age: 67
Setting detail: OBSERVATION
Discharge: HOME | End: 2017-09-01
Attending: INTERNAL MEDICINE | Admitting: INTERNAL MEDICINE
Payer: MEDICARE

## 2017-08-31 ENCOUNTER — TELEPHONE (OUTPATIENT)
Dept: INTERNAL MEDICINE CLINIC | Age: 67
End: 2017-08-31

## 2017-08-31 ENCOUNTER — HOSPITAL ENCOUNTER (OUTPATIENT)
Dept: VASCULAR LAB | Age: 67
Discharge: HOME | End: 2017-08-31
Payer: MEDICARE

## 2017-08-31 PROBLEM — I82.401 DEEP VEIN THROMBOSIS (DVT) OF RIGHT LOWER EXTREMITY (HCC): Status: ACTIVE | Noted: 2017-08-30

## 2017-08-31 PROBLEM — I82.409 DEEP VENOUS THROMBOSIS OF LOWER EXTREMITY (HCC): Status: ACTIVE | Noted: 2017-08-30

## 2017-08-31 LAB
ABSOLUTE EOS #: 0.4 K/UL (ref 0–0.4)
ABSOLUTE LYMPH #: 2 K/UL (ref 1–4.8)
ABSOLUTE MONO #: 0.5 K/UL (ref 0.1–1.3)
ANION GAP SERPL CALCULATED.3IONS-SCNC: 13 MMOL/L (ref 9–17)
BASOPHILS # BLD: 1 %
BASOPHILS ABSOLUTE: 0.1 K/UL (ref 0–0.2)
BUN BLDV-MCNC: 31 MG/DL (ref 8–23)
BUN/CREAT BLD: ABNORMAL (ref 9–20)
CALCIUM SERPL-MCNC: 9 MG/DL (ref 8.6–10.4)
CHLORIDE BLD-SCNC: 104 MMOL/L (ref 98–107)
CO2: 24 MMOL/L (ref 20–31)
CREAT SERPL-MCNC: 0.87 MG/DL (ref 0.5–0.9)
DIFFERENTIAL TYPE: ABNORMAL
EOSINOPHILS RELATIVE PERCENT: 6 %
GFR AFRICAN AMERICAN: >60 ML/MIN
GFR NON-AFRICAN AMERICAN: >60 ML/MIN
GFR SERPL CREATININE-BSD FRML MDRD: ABNORMAL ML/MIN/{1.73_M2}
GFR SERPL CREATININE-BSD FRML MDRD: ABNORMAL ML/MIN/{1.73_M2}
GLUCOSE BLD-MCNC: 99 MG/DL (ref 70–99)
HCT VFR BLD CALC: 38.2 % (ref 36–46)
HEMOGLOBIN: 12.5 G/DL (ref 12–16)
LYMPHOCYTES # BLD: 34 %
MCH RBC QN AUTO: 31.8 PG (ref 26–34)
MCHC RBC AUTO-ENTMCNC: 32.8 G/DL (ref 31–37)
MCV RBC AUTO: 96.8 FL (ref 80–100)
MONOCYTES # BLD: 9 %
PARTIAL THROMBOPLASTIN TIME: 23.7 SEC (ref 23–31)
PARTIAL THROMBOPLASTIN TIME: 86.3 SEC (ref 23–31)
PDW BLD-RTO: 13.7 % (ref 11.5–14.9)
PLATELET # BLD: 299 K/UL (ref 150–450)
PLATELET ESTIMATE: ABNORMAL
PMV BLD AUTO: 7.2 FL (ref 6–12)
POTASSIUM SERPL-SCNC: 4.6 MMOL/L (ref 3.7–5.3)
RBC # BLD: 3.95 M/UL (ref 4–5.2)
RBC # BLD: ABNORMAL 10*6/UL
SEG NEUTROPHILS: 50 %
SEGMENTED NEUTROPHILS ABSOLUTE COUNT: 3.1 K/UL (ref 1.3–9.1)
SODIUM BLD-SCNC: 141 MMOL/L (ref 135–144)
WBC # BLD: 6 K/UL (ref 3.5–11)
WBC # BLD: ABNORMAL 10*3/UL

## 2017-08-31 PROCEDURE — 96376 TX/PRO/DX INJ SAME DRUG ADON: CPT

## 2017-08-31 PROCEDURE — 85025 COMPLETE CBC W/AUTO DIFF WBC: CPT

## 2017-08-31 PROCEDURE — 36415 COLL VENOUS BLD VENIPUNCTURE: CPT

## 2017-08-31 PROCEDURE — 93970 EXTREMITY STUDY: CPT

## 2017-08-31 PROCEDURE — G0378 HOSPITAL OBSERVATION PER HR: HCPCS

## 2017-08-31 PROCEDURE — 96365 THER/PROPH/DIAG IV INF INIT: CPT

## 2017-08-31 PROCEDURE — 85730 THROMBOPLASTIN TIME PARTIAL: CPT

## 2017-08-31 PROCEDURE — G0379 DIRECT REFER HOSPITAL OBSERV: HCPCS

## 2017-08-31 PROCEDURE — 99222 1ST HOSP IP/OBS MODERATE 55: CPT | Performed by: INTERNAL MEDICINE

## 2017-08-31 PROCEDURE — 6360000002 HC RX W HCPCS: Performed by: RADIOLOGY

## 2017-08-31 PROCEDURE — 6370000000 HC RX 637 (ALT 250 FOR IP): Performed by: RADIOLOGY

## 2017-08-31 PROCEDURE — 96366 THER/PROPH/DIAG IV INF ADDON: CPT

## 2017-08-31 PROCEDURE — 80048 BASIC METABOLIC PNL TOTAL CA: CPT

## 2017-08-31 RX ORDER — HEPARIN SODIUM 10000 [USP'U]/100ML
18 INJECTION, SOLUTION INTRAVENOUS CONTINUOUS
Status: DISCONTINUED | OUTPATIENT
Start: 2017-08-31 | End: 2017-09-01

## 2017-08-31 RX ORDER — FENOFIBRATE 160 MG/1
160 TABLET ORAL DAILY
Status: DISCONTINUED | OUTPATIENT
Start: 2017-08-31 | End: 2017-09-01 | Stop reason: HOSPADM

## 2017-08-31 RX ORDER — HEPARIN SODIUM 1000 [USP'U]/ML
80 INJECTION, SOLUTION INTRAVENOUS; SUBCUTANEOUS PRN
Status: DISCONTINUED | OUTPATIENT
Start: 2017-08-31 | End: 2017-09-01

## 2017-08-31 RX ORDER — POTASSIUM CHLORIDE 20 MEQ/1
40 TABLET, EXTENDED RELEASE ORAL PRN
Status: DISCONTINUED | OUTPATIENT
Start: 2017-08-31 | End: 2017-09-01 | Stop reason: HOSPADM

## 2017-08-31 RX ORDER — ONDANSETRON 2 MG/ML
4 INJECTION INTRAMUSCULAR; INTRAVENOUS EVERY 6 HOURS PRN
Status: DISCONTINUED | OUTPATIENT
Start: 2017-08-31 | End: 2017-09-01 | Stop reason: HOSPADM

## 2017-08-31 RX ORDER — ATORVASTATIN CALCIUM 80 MG/1
80 TABLET, FILM COATED ORAL NIGHTLY
Status: DISCONTINUED | OUTPATIENT
Start: 2017-08-31 | End: 2017-09-01 | Stop reason: HOSPADM

## 2017-08-31 RX ORDER — NITROGLYCERIN 0.4 MG/1
0.4 TABLET SUBLINGUAL EVERY 5 MIN PRN
Status: DISCONTINUED | OUTPATIENT
Start: 2017-08-31 | End: 2017-09-01 | Stop reason: HOSPADM

## 2017-08-31 RX ORDER — SODIUM CHLORIDE 0.9 % (FLUSH) 0.9 %
10 SYRINGE (ML) INJECTION EVERY 12 HOURS SCHEDULED
Status: DISCONTINUED | OUTPATIENT
Start: 2017-08-31 | End: 2017-09-01 | Stop reason: HOSPADM

## 2017-08-31 RX ORDER — ACETAMINOPHEN 325 MG/1
650 TABLET ORAL EVERY 4 HOURS PRN
Status: DISCONTINUED | OUTPATIENT
Start: 2017-08-31 | End: 2017-09-01 | Stop reason: HOSPADM

## 2017-08-31 RX ORDER — CITALOPRAM 10 MG/1
10 TABLET ORAL DAILY
Status: DISCONTINUED | OUTPATIENT
Start: 2017-08-31 | End: 2017-09-01 | Stop reason: HOSPADM

## 2017-08-31 RX ORDER — HEPARIN SODIUM 1000 [USP'U]/ML
80 INJECTION, SOLUTION INTRAVENOUS; SUBCUTANEOUS ONCE
Status: COMPLETED | OUTPATIENT
Start: 2017-08-31 | End: 2017-08-31

## 2017-08-31 RX ORDER — DOXYCYCLINE 100 MG/1
100 CAPSULE ORAL EVERY 12 HOURS SCHEDULED
Status: DISCONTINUED | OUTPATIENT
Start: 2017-08-31 | End: 2017-09-01 | Stop reason: HOSPADM

## 2017-08-31 RX ORDER — FAMOTIDINE 20 MG/1
20 TABLET, FILM COATED ORAL 2 TIMES DAILY
Status: DISCONTINUED | OUTPATIENT
Start: 2017-08-31 | End: 2017-09-01 | Stop reason: HOSPADM

## 2017-08-31 RX ORDER — PRAMIPEXOLE DIHYDROCHLORIDE 1.5 MG/1
1.5 TABLET ORAL DAILY
Status: DISCONTINUED | OUTPATIENT
Start: 2017-08-31 | End: 2017-09-01 | Stop reason: HOSPADM

## 2017-08-31 RX ORDER — DIPHENHYDRAMINE HCL 25 MG
50 TABLET ORAL NIGHTLY PRN
Status: DISCONTINUED | OUTPATIENT
Start: 2017-08-31 | End: 2017-09-01 | Stop reason: HOSPADM

## 2017-08-31 RX ORDER — ASPIRIN 81 MG/1
81 TABLET, CHEWABLE ORAL DAILY
Status: DISCONTINUED | OUTPATIENT
Start: 2017-08-31 | End: 2017-09-01 | Stop reason: HOSPADM

## 2017-08-31 RX ORDER — ATENOLOL 50 MG/1
50 TABLET ORAL DAILY
Status: DISCONTINUED | OUTPATIENT
Start: 2017-08-31 | End: 2017-09-01 | Stop reason: HOSPADM

## 2017-08-31 RX ORDER — POTASSIUM CHLORIDE 7.45 MG/ML
10 INJECTION INTRAVENOUS PRN
Status: DISCONTINUED | OUTPATIENT
Start: 2017-08-31 | End: 2017-09-01 | Stop reason: HOSPADM

## 2017-08-31 RX ORDER — OXYCODONE HYDROCHLORIDE AND ACETAMINOPHEN 5; 325 MG/1; MG/1
1 TABLET ORAL EVERY 6 HOURS PRN
Status: DISCONTINUED | OUTPATIENT
Start: 2017-08-31 | End: 2017-09-01 | Stop reason: HOSPADM

## 2017-08-31 RX ORDER — HEPARIN SODIUM 1000 [USP'U]/ML
40 INJECTION, SOLUTION INTRAVENOUS; SUBCUTANEOUS PRN
Status: DISCONTINUED | OUTPATIENT
Start: 2017-08-31 | End: 2017-09-01

## 2017-08-31 RX ORDER — CALCIUM CARBONATE/VITAMIN D3 600 MG-10
1 TABLET ORAL 2 TIMES DAILY
Status: DISCONTINUED | OUTPATIENT
Start: 2017-08-31 | End: 2017-09-01 | Stop reason: HOSPADM

## 2017-08-31 RX ORDER — CYCLOBENZAPRINE HCL 10 MG
10 TABLET ORAL 3 TIMES DAILY PRN
Status: DISCONTINUED | OUTPATIENT
Start: 2017-08-31 | End: 2017-09-01 | Stop reason: HOSPADM

## 2017-08-31 RX ORDER — AMLODIPINE BESYLATE 5 MG/1
5 TABLET ORAL DAILY
Status: DISCONTINUED | OUTPATIENT
Start: 2017-08-31 | End: 2017-09-01 | Stop reason: HOSPADM

## 2017-08-31 RX ORDER — POTASSIUM CHLORIDE 20MEQ/15ML
40 LIQUID (ML) ORAL PRN
Status: DISCONTINUED | OUTPATIENT
Start: 2017-08-31 | End: 2017-09-01 | Stop reason: HOSPADM

## 2017-08-31 RX ORDER — SODIUM CHLORIDE 0.9 % (FLUSH) 0.9 %
10 SYRINGE (ML) INJECTION PRN
Status: DISCONTINUED | OUTPATIENT
Start: 2017-08-31 | End: 2017-09-01 | Stop reason: HOSPADM

## 2017-08-31 RX ADMIN — DOXYCYCLINE 100 MG: 100 CAPSULE ORAL at 19:49

## 2017-08-31 RX ADMIN — CITALOPRAM HYDROBROMIDE 10 MG: 10 TABLET ORAL at 16:04

## 2017-08-31 RX ADMIN — FAMOTIDINE 20 MG: 20 TABLET, FILM COATED ORAL at 19:48

## 2017-08-31 RX ADMIN — Medication 1 TABLET: at 14:34

## 2017-08-31 RX ADMIN — HEPARIN SODIUM AND DEXTROSE 18 UNITS/KG/HR: 10000; 5 INJECTION INTRAVENOUS at 15:44

## 2017-08-31 RX ADMIN — ATORVASTATIN CALCIUM 80 MG: 80 TABLET, FILM COATED ORAL at 19:48

## 2017-08-31 RX ADMIN — LISINOPRIL 30 MG: 20 TABLET ORAL at 14:33

## 2017-08-31 RX ADMIN — FAMOTIDINE 20 MG: 20 TABLET, FILM COATED ORAL at 14:34

## 2017-08-31 RX ADMIN — PRAMIPEXOLE DIHYDROCHLORIDE 1.5 MG: 1.5 TABLET ORAL at 16:04

## 2017-08-31 RX ADMIN — Medication 1 TABLET: at 19:48

## 2017-08-31 RX ADMIN — AMLODIPINE BESYLATE 5 MG: 5 TABLET ORAL at 14:34

## 2017-08-31 RX ADMIN — OXYCODONE HYDROCHLORIDE AND ACETAMINOPHEN 1 TABLET: 5; 325 TABLET ORAL at 22:54

## 2017-08-31 RX ADMIN — FENOFIBRATE 160 MG: 160 TABLET ORAL at 16:04

## 2017-08-31 RX ADMIN — ASPIRIN 81 MG 81 MG: 81 TABLET ORAL at 19:48

## 2017-08-31 RX ADMIN — HEPARIN SODIUM 6700 UNITS: 1000 INJECTION, SOLUTION INTRAVENOUS; SUBCUTANEOUS at 15:35

## 2017-08-31 RX ADMIN — ATENOLOL 50 MG: 50 TABLET ORAL at 14:34

## 2017-08-31 ASSESSMENT — ENCOUNTER SYMPTOMS
CONSTIPATION: 1
NAUSEA: 0
DIARRHEA: 0
CHEST TIGHTNESS: 0
ABDOMINAL PAIN: 0
COUGH: 0
VOMITING: 0
SHORTNESS OF BREATH: 0

## 2017-08-31 ASSESSMENT — PAIN SCALES - GENERAL
PAINLEVEL_OUTOF10: 7
PAINLEVEL_OUTOF10: 0

## 2017-09-01 VITALS
TEMPERATURE: 97.9 F | WEIGHT: 184.75 LBS | BODY MASS INDEX: 31.54 KG/M2 | HEART RATE: 63 BPM | SYSTOLIC BLOOD PRESSURE: 126 MMHG | OXYGEN SATURATION: 98 % | HEIGHT: 64 IN | DIASTOLIC BLOOD PRESSURE: 56 MMHG | RESPIRATION RATE: 18 BRPM

## 2017-09-01 LAB
PARTIAL THROMBOPLASTIN TIME: 55.3 SEC (ref 23–31)
PARTIAL THROMBOPLASTIN TIME: 73.2 SEC (ref 23–31)

## 2017-09-01 PROCEDURE — 6370000000 HC RX 637 (ALT 250 FOR IP): Performed by: INTERNAL MEDICINE

## 2017-09-01 PROCEDURE — 6360000002 HC RX W HCPCS: Performed by: RADIOLOGY

## 2017-09-01 PROCEDURE — 6370000000 HC RX 637 (ALT 250 FOR IP): Performed by: RADIOLOGY

## 2017-09-01 PROCEDURE — G0378 HOSPITAL OBSERVATION PER HR: HCPCS

## 2017-09-01 PROCEDURE — 99239 HOSP IP/OBS DSCHRG MGMT >30: CPT | Performed by: INTERNAL MEDICINE

## 2017-09-01 PROCEDURE — 96366 THER/PROPH/DIAG IV INF ADDON: CPT

## 2017-09-01 PROCEDURE — 36415 COLL VENOUS BLD VENIPUNCTURE: CPT

## 2017-09-01 PROCEDURE — 85730 THROMBOPLASTIN TIME PARTIAL: CPT

## 2017-09-01 RX ADMIN — HEPARIN SODIUM AND DEXTROSE 13.1 UNITS/KG/HR: 10000; 5 INJECTION INTRAVENOUS at 06:59

## 2017-09-01 RX ADMIN — FAMOTIDINE 20 MG: 20 TABLET, FILM COATED ORAL at 08:56

## 2017-09-01 RX ADMIN — APIXABAN 10 MG: 5 TABLET, FILM COATED ORAL at 15:41

## 2017-09-01 RX ADMIN — LISINOPRIL 30 MG: 20 TABLET ORAL at 08:55

## 2017-09-01 RX ADMIN — PRAMIPEXOLE DIHYDROCHLORIDE 1.5 MG: 1.5 TABLET ORAL at 08:58

## 2017-09-01 RX ADMIN — ATENOLOL 50 MG: 50 TABLET ORAL at 09:01

## 2017-09-01 RX ADMIN — DOXYCYCLINE 100 MG: 100 CAPSULE ORAL at 08:55

## 2017-09-01 RX ADMIN — OXYCODONE HYDROCHLORIDE AND ACETAMINOPHEN 1 TABLET: 5; 325 TABLET ORAL at 14:07

## 2017-09-01 RX ADMIN — ASPIRIN 81 MG 81 MG: 81 TABLET ORAL at 08:54

## 2017-09-01 RX ADMIN — CITALOPRAM HYDROBROMIDE 10 MG: 10 TABLET ORAL at 08:58

## 2017-09-01 RX ADMIN — AMLODIPINE BESYLATE 5 MG: 5 TABLET ORAL at 08:55

## 2017-09-01 RX ADMIN — Medication 1 TABLET: at 08:55

## 2017-09-01 RX ADMIN — FENOFIBRATE 160 MG: 160 TABLET ORAL at 08:58

## 2017-09-01 RX ADMIN — OXYCODONE HYDROCHLORIDE AND ACETAMINOPHEN 1 TABLET: 5; 325 TABLET ORAL at 06:22

## 2017-09-01 ASSESSMENT — PAIN SCALES - GENERAL
PAINLEVEL_OUTOF10: 2
PAINLEVEL_OUTOF10: 2
PAINLEVEL_OUTOF10: 5
PAINLEVEL_OUTOF10: 6

## 2017-09-01 ASSESSMENT — PAIN DESCRIPTION - LOCATION: LOCATION: BACK

## 2017-09-01 ASSESSMENT — PAIN DESCRIPTION - PAIN TYPE: TYPE: CHRONIC PAIN

## 2017-09-02 ENCOUNTER — CARE COORDINATION (OUTPATIENT)
Dept: CASE MANAGEMENT | Age: 67
End: 2017-09-02

## 2017-09-05 ENCOUNTER — TELEPHONE (OUTPATIENT)
Dept: PHARMACY | Facility: CLINIC | Age: 67
End: 2017-09-05

## 2017-09-11 ENCOUNTER — TELEPHONE (OUTPATIENT)
Dept: INTERNAL MEDICINE CLINIC | Age: 67
End: 2017-09-11

## 2017-09-11 ENCOUNTER — OFFICE VISIT (OUTPATIENT)
Dept: INTERNAL MEDICINE CLINIC | Age: 67
End: 2017-09-11
Payer: MEDICARE

## 2017-09-11 VITALS — WEIGHT: 180 LBS | BODY MASS INDEX: 30.73 KG/M2 | HEIGHT: 64 IN

## 2017-09-11 DIAGNOSIS — I10 ESSENTIAL HYPERTENSION: ICD-10-CM

## 2017-09-11 DIAGNOSIS — G25.81 RESTLESS LEGS SYNDROME (RLS): ICD-10-CM

## 2017-09-11 DIAGNOSIS — M47.816 SPONDYLOSIS OF LUMBAR REGION WITHOUT MYELOPATHY OR RADICULOPATHY: ICD-10-CM

## 2017-09-11 DIAGNOSIS — I82.492 DEEP VEIN THROMBOSIS (DVT) OF OTHER VEIN OF LEFT LOWER EXTREMITY, UNSPECIFIED CHRONICITY (HCC): ICD-10-CM

## 2017-09-11 DIAGNOSIS — M79.89 LEG SWELLING: Primary | ICD-10-CM

## 2017-09-11 PROCEDURE — 99495 TRANSJ CARE MGMT MOD F2F 14D: CPT | Performed by: INTERNAL MEDICINE

## 2017-09-11 RX ORDER — FUROSEMIDE 20 MG/1
20 TABLET ORAL DAILY
Qty: 60 TABLET | Refills: 3 | Status: SHIPPED | OUTPATIENT
Start: 2017-09-11 | End: 2018-01-08 | Stop reason: SDUPTHER

## 2017-09-11 ASSESSMENT — ENCOUNTER SYMPTOMS
PHOTOPHOBIA: 0
COLOR CHANGE: 0
BLOOD IN STOOL: 0
ABDOMINAL DISTENTION: 0
EYE ITCHING: 0
CONSTIPATION: 0
EYE REDNESS: 0
SINUS PRESSURE: 0
APNEA: 0
STRIDOR: 0
SORE THROAT: 0
VOMITING: 0
RHINORRHEA: 0
ANAL BLEEDING: 0
RECTAL PAIN: 0
BACK PAIN: 1
VOICE CHANGE: 0
CHOKING: 0
EYE DISCHARGE: 0
COUGH: 0
FACIAL SWELLING: 0
CHEST TIGHTNESS: 0
DIARRHEA: 0
EYE PAIN: 0
NAUSEA: 0
TROUBLE SWALLOWING: 0
SHORTNESS OF BREATH: 0
WHEEZING: 0
ABDOMINAL PAIN: 0

## 2017-09-14 ENCOUNTER — CARE COORDINATION (OUTPATIENT)
Dept: CASE MANAGEMENT | Age: 67
End: 2017-09-14

## 2017-09-18 ENCOUNTER — HOSPITAL ENCOUNTER (OUTPATIENT)
Dept: PAIN MANAGEMENT | Age: 67
Discharge: HOME | End: 2017-09-18
Payer: MEDICARE

## 2017-09-18 DIAGNOSIS — M51.36 LUMBAR DEGENERATIVE DISC DISEASE: ICD-10-CM

## 2017-09-18 DIAGNOSIS — Z51.81 MEDICATION MONITORING ENCOUNTER: Primary | ICD-10-CM

## 2017-09-18 PROCEDURE — 99213 OFFICE O/P EST LOW 20 MIN: CPT

## 2017-09-18 PROCEDURE — 99214 OFFICE O/P EST MOD 30 MIN: CPT | Performed by: NURSE PRACTITIONER

## 2017-09-18 RX ORDER — HYDROCODONE BITARTRATE AND ACETAMINOPHEN 5; 325 MG/1; MG/1
1 TABLET ORAL EVERY 12 HOURS PRN
Qty: 60 TABLET | Refills: 0 | Status: SHIPPED | OUTPATIENT
Start: 2017-09-18 | End: 2017-10-13 | Stop reason: SDUPTHER

## 2017-09-18 RX ORDER — PRAMIPEXOLE DIHYDROCHLORIDE 1 MG/1
1.5 TABLET ORAL DAILY
COMMUNITY
End: 2018-01-08 | Stop reason: SDUPTHER

## 2017-09-18 ASSESSMENT — ENCOUNTER SYMPTOMS
BACK PAIN: 1
COUGH: 0
BOWEL INCONTINENCE: 0
SHORTNESS OF BREATH: 0
CONSTIPATION: 0

## 2017-09-21 ENCOUNTER — TELEPHONE (OUTPATIENT)
Dept: INTERNAL MEDICINE CLINIC | Age: 67
End: 2017-09-21

## 2017-09-25 ENCOUNTER — HOSPITAL ENCOUNTER (OUTPATIENT)
Age: 67
Setting detail: SPECIMEN
Discharge: HOME | End: 2017-09-25
Payer: MEDICARE

## 2017-09-25 DIAGNOSIS — I10 ESSENTIAL HYPERTENSION: ICD-10-CM

## 2017-09-25 LAB
ANION GAP SERPL CALCULATED.3IONS-SCNC: 16 MMOL/L (ref 9–17)
BUN BLDV-MCNC: 34 MG/DL (ref 8–23)
BUN/CREAT BLD: ABNORMAL (ref 9–20)
CALCIUM SERPL-MCNC: 9.5 MG/DL (ref 8.6–10.4)
CHLORIDE BLD-SCNC: 102 MMOL/L (ref 98–107)
CO2: 25 MMOL/L (ref 20–31)
CREAT SERPL-MCNC: 1.02 MG/DL (ref 0.5–0.9)
GFR AFRICAN AMERICAN: >60 ML/MIN
GFR NON-AFRICAN AMERICAN: 54 ML/MIN
GFR SERPL CREATININE-BSD FRML MDRD: ABNORMAL ML/MIN/{1.73_M2}
GFR SERPL CREATININE-BSD FRML MDRD: ABNORMAL ML/MIN/{1.73_M2}
GLUCOSE BLD-MCNC: 112 MG/DL (ref 70–99)
POTASSIUM SERPL-SCNC: 4 MMOL/L (ref 3.7–5.3)
SODIUM BLD-SCNC: 143 MMOL/L (ref 135–144)

## 2017-10-03 ENCOUNTER — TELEPHONE (OUTPATIENT)
Dept: PAIN MANAGEMENT | Age: 67
End: 2017-10-03

## 2017-10-03 RX ORDER — ALPRAZOLAM 0.5 MG/1
0.5 TABLET ORAL 3 TIMES DAILY PRN
Qty: 2 TABLET | Refills: 0 | Status: SHIPPED | OUTPATIENT
Start: 2017-10-03 | End: 2017-10-09 | Stop reason: SDUPTHER

## 2017-10-03 NOTE — TELEPHONE ENCOUNTER
Pt Is scheduled for 10/13 for MBB and requested a medication to calm her down sent to her pharmacy to take before her appt

## 2017-10-09 ENCOUNTER — TELEPHONE (OUTPATIENT)
Dept: PAIN MANAGEMENT | Age: 67
End: 2017-10-09

## 2017-10-09 ENCOUNTER — OFFICE VISIT (OUTPATIENT)
Dept: INTERNAL MEDICINE CLINIC | Age: 67
End: 2017-10-09
Payer: MEDICARE

## 2017-10-09 VITALS
SYSTOLIC BLOOD PRESSURE: 180 MMHG | HEIGHT: 64 IN | DIASTOLIC BLOOD PRESSURE: 78 MMHG | WEIGHT: 178 LBS | BODY MASS INDEX: 30.39 KG/M2

## 2017-10-09 DIAGNOSIS — G45.2 MULTIPLE AND BILATERAL PRECEREBRAL ARTERY SYNDROMES: ICD-10-CM

## 2017-10-09 DIAGNOSIS — E78.2 MIXED HYPERLIPIDEMIA: ICD-10-CM

## 2017-10-09 DIAGNOSIS — R60.0 BILATERAL LEG EDEMA: ICD-10-CM

## 2017-10-09 DIAGNOSIS — N17.9 AKI (ACUTE KIDNEY INJURY) (HCC): ICD-10-CM

## 2017-10-09 DIAGNOSIS — I10 ESSENTIAL HYPERTENSION: ICD-10-CM

## 2017-10-09 DIAGNOSIS — I50.32 CHRONIC DIASTOLIC CONGESTIVE HEART FAILURE (HCC): Primary | ICD-10-CM

## 2017-10-09 PROCEDURE — 4040F PNEUMOC VAC/ADMIN/RCVD: CPT | Performed by: INTERNAL MEDICINE

## 2017-10-09 PROCEDURE — 1123F ACP DISCUSS/DSCN MKR DOCD: CPT | Performed by: INTERNAL MEDICINE

## 2017-10-09 PROCEDURE — 3014F SCREEN MAMMO DOC REV: CPT | Performed by: INTERNAL MEDICINE

## 2017-10-09 PROCEDURE — 3017F COLORECTAL CA SCREEN DOC REV: CPT | Performed by: INTERNAL MEDICINE

## 2017-10-09 PROCEDURE — G8417 CALC BMI ABV UP PARAM F/U: HCPCS | Performed by: INTERNAL MEDICINE

## 2017-10-09 PROCEDURE — G8484 FLU IMMUNIZE NO ADMIN: HCPCS | Performed by: INTERNAL MEDICINE

## 2017-10-09 PROCEDURE — 99214 OFFICE O/P EST MOD 30 MIN: CPT | Performed by: INTERNAL MEDICINE

## 2017-10-09 PROCEDURE — G8427 DOCREV CUR MEDS BY ELIG CLIN: HCPCS | Performed by: INTERNAL MEDICINE

## 2017-10-09 PROCEDURE — G8598 ASA/ANTIPLAT THER USED: HCPCS | Performed by: INTERNAL MEDICINE

## 2017-10-09 PROCEDURE — G8399 PT W/DXA RESULTS DOCUMENT: HCPCS | Performed by: INTERNAL MEDICINE

## 2017-10-09 PROCEDURE — 1090F PRES/ABSN URINE INCON ASSESS: CPT | Performed by: INTERNAL MEDICINE

## 2017-10-09 PROCEDURE — 1036F TOBACCO NON-USER: CPT | Performed by: INTERNAL MEDICINE

## 2017-10-09 RX ORDER — LISINOPRIL 20 MG/1
40 TABLET ORAL DAILY
Qty: 90 TABLET | Refills: 3 | Status: SHIPPED | OUTPATIENT
Start: 2017-10-09 | End: 2018-01-08 | Stop reason: SDUPTHER

## 2017-10-09 ASSESSMENT — ENCOUNTER SYMPTOMS
BACK PAIN: 1
CHEST TIGHTNESS: 0
SHORTNESS OF BREATH: 1
CHOKING: 0
RHINORRHEA: 0
CONSTIPATION: 0
DIARRHEA: 0
EYE ITCHING: 0
NAUSEA: 0
ANAL BLEEDING: 0
APNEA: 0
RECTAL PAIN: 0
EYE DISCHARGE: 0
EYE PAIN: 0
COLOR CHANGE: 0
FACIAL SWELLING: 0
ABDOMINAL PAIN: 0
VOMITING: 0
EYE REDNESS: 0
WHEEZING: 0
COUGH: 0
SINUS PRESSURE: 0
PHOTOPHOBIA: 0
STRIDOR: 0
ABDOMINAL DISTENTION: 0
VOICE CHANGE: 0
BLOOD IN STOOL: 0
SORE THROAT: 0
TROUBLE SWALLOWING: 0

## 2017-10-09 NOTE — PROGRESS NOTES
Problem Relation Age of Onset    Stroke Mother     Diabetes Mother     Heart Disease Mother     High Blood Pressure Mother     Heart Disease Father     Heart Disease Brother     High Blood Pressure Brother     Heart Disease Maternal Grandmother     High Blood Pressure Sister        Social History     Social History    Marital status:      Spouse name: N/A    Number of children: N/A    Years of education: N/A     Occupational History    retired      Social History Main Topics    Smoking status: Former Smoker     Packs/day: 0.50     Years: 20.00     Types: Cigarettes     Start date: 8/11/1995    Smokeless tobacco: Never Used      Comment: 6-20-17 quit 10 days ago    Alcohol use 0.0 oz/week      Comment: occasional    Drug use: No    Sexual activity: Not Asked     Other Topics Concern    None     Social History Narrative    None       Current Outpatient Prescriptions   Medication Sig Dispense Refill    lisinopril (PRINIVIL;ZESTRIL) 20 MG tablet Take 2 tablets by mouth daily 90 tablet 3    ALPRAZolam (XANAX) 0.5 MG tablet Take 1 tablet by mouth 3 times daily as needed for Anxiety (take 1 po 1 hr prior to procedure and one more 30 mins. prior to procedure if needed) 2 tablet 0    pramipexole (MIRAPEX) 1 MG tablet Take 1.5 mg by mouth daily      HYDROcodone-acetaminophen (NORCO) 5-325 MG per tablet Take 1 tablet by mouth every 12 hours as needed for Pain .  60 tablet 0    furosemide (LASIX) 20 MG tablet Take 1 tablet by mouth daily 60 tablet 3    apixaban (ELIQUIS) 5 MG TABS tablet Take 1 tablet by mouth 2 times daily 60 tablet 5    citalopram (CELEXA) 10 MG tablet Take 1 tablet by mouth daily 30 tablet 3    atenolol (TENORMIN) 50 MG tablet Take 1 tablet by mouth daily 90 tablet 3    fenofibrate micronized (LOFIBRA) 134 MG capsule Take 1 capsule by mouth every morning (before breakfast) 90 capsule 3    atorvastatin (LIPITOR) 80 MG tablet Take 1 tablet by mouth nightly 90 tablet 3    cyclobenzaprine (FLEXERIL) 10 MG tablet Take 1 tablet by mouth 3 times daily as needed for Muscle spasms 270 tablet 3    famotidine (PEPCID) 20 MG tablet Take 1 tablet by mouth 2 times daily 180 tablet 3    vitamin D (CHOLECALCIFEROL) 1000 UNIT TABS tablet Take 1 tablet by mouth daily 90 tablet 3    nitroGLYCERIN (NITROSTAT) 0.4 MG SL tablet Place 1 tablet under the tongue every 5 minutes as needed for Chest pain 75 tablet 3    aspirin 81 MG chewable tablet Take 81 mg by mouth daily      Calcium Carbonate-Vitamin D (CALCIUM 500/D) 500-125 MG-UNIT TABS Take 1 tablet by mouth 2 times daily        No current facility-administered medications for this visit. Review of Systems:    Review of Systems   Constitutional: Negative for activity change, appetite change, chills, diaphoresis, fatigue and fever. HENT: Negative for congestion, dental problem, drooling, ear discharge, ear pain, facial swelling, hearing loss, mouth sores, nosebleeds, postnasal drip, rhinorrhea, sinus pressure, sneezing, sore throat, tinnitus, trouble swallowing and voice change. Eyes: Negative for photophobia, pain, discharge, redness, itching and visual disturbance. Respiratory: Positive for shortness of breath. Negative for apnea, cough, choking, chest tightness, wheezing and stridor. Cardiovascular: Positive for leg swelling. Negative for chest pain and palpitations. Gastrointestinal: Negative for abdominal distention, abdominal pain, anal bleeding, blood in stool, constipation, diarrhea, nausea, rectal pain and vomiting. Endocrine: Negative for cold intolerance, heat intolerance, polydipsia, polyphagia and polyuria. Genitourinary: Negative for decreased urine volume, difficulty urinating, dyspareunia, dysuria, enuresis, flank pain, frequency, genital sores, hematuria, menstrual problem, pelvic pain, urgency, vaginal bleeding and vaginal discharge. Musculoskeletal: Positive for back pain.  Negative for arthralgias, No cranial nerve deficit. She exhibits normal muscle tone. Coordination normal.   Skin: Skin is warm and dry. No rash noted. She is not diaphoretic. No erythema. No pallor. Psychiatric: She has a normal mood and affect. Her behavior is normal. Judgment and thought content normal.         Results for orders placed or performed during the hospital encounter of 01/91/74   Basic Metabolic Panel   Result Value Ref Range    Glucose 112 (H) 70 - 99 mg/dL    BUN 34 (H) 8 - 23 mg/dL    CREATININE 1.02 (H) 0.50 - 0.90 mg/dL    Bun/Cre Ratio NOT REPORTED 9 - 20    Calcium 9.5 8.6 - 10.4 mg/dL    Sodium 143 135 - 144 mmol/L    Potassium 4.0 3.7 - 5.3 mmol/L    Chloride 102 98 - 107 mmol/L    CO2 25 20 - 31 mmol/L    Anion Gap 16 9 - 17 mmol/L    GFR Non-African American 54 (L) >60 mL/min    GFR African American >60 >60 mL/min    GFR Comment          GFR Staging NOT REPORTED      No results found. Assessment:     1. Chronic diastolic congestive heart failure (HCC)   poor mcompliance on lasix cr 1 bp still elevated    2. Essential hypertension   elevated le edema d/c norvasc increase lisinopril  Cont lasix  Recheck bmp 2 weeks low salt diet   3. Bilateral leg edema   fron duiastolic chd and poor diet compliance    4. Multiple and bilateral precerebral artery syndromes   tia resolved cont same has dyslipiodemia on stystoin cont   5. Mixed hyperlipidemia   cont ststin vasculopath   6.  BIN (acute kidney injury) ([facility] [address]    Cr 1 better ace increaed    lkasix recheck         Plan:     Orders Placed This Encounter   Procedures    Basic Metabolic Panel     Standing Status:   Future     Standing Expiration Date:   10/9/2018       Orders Placed This Encounter   Medications    lisinopril (PRINIVIL;ZESTRIL) 20 MG tablet     Sig: Take 2 tablets by mouth daily     Dispense:  90 tablet     Refill:  3         Stop norvasc   increase lisinopril 40 mg     recheck bmp   low salt diet     wt loss        Orders Placed This Encounter Medications    lisinopril (PRINIVIL;ZESTRIL) 20 MG tablet     Sig: Take 2 tablets by mouth daily     Dispense:  90 tablet     Refill:  3     Criselda received counseling on the following healthy behaviors: exercise  Reviewed prior labs and health maintenance  Continue current medications, diet and exercise. Discussed use, benefit, and side effects of prescribed medications. Barriers to medication compliance addressed. Patient given educational materials - see patient instructions  Was a self-tracking handout given in paper form or via [employer]? Yes    Requested Prescriptions     Signed Prescriptions Disp Refills    lisinopril (PRINIVIL;ZESTRIL) 20 MG tablet 90 tablet 3     Sig: Take 2 tablets by mouth daily       All patient questions answered. Patient voiced understanding. Quality Measures    Body mass index is 30.39 kg/m². Elevated. Weight control planned discussed daily exercise regimen and Healthy diet and regular exercise. BP: (!) 180/78. Blood pressure is normal. Treatment plan consists of Dietary Sodium Restriction and No treatment change needed. Fall Risk 7/31/2017 5/22/2017 2/22/2016   2 or more falls in past year? yes no yes   Fall with injury in past year? - no no     The patient does not have a history of falls. I did , complete a risk assessment for falls. A plan of care for falls home safety tips provided, No Treatment plan indicated    Lab Results   Component Value Date    LDLCHOLESTEROL 41 07/01/2017    (goal LDL reduction with dx if diabetes is 50% LDL reduction)    PHQ Scores 5/22/2017   PHQ2 Score 0   PHQ9 Score 0     Interpretation of Total Score Depression Severity: 1-4 = Minimal depression, 5-9 = Mild depression, 10-14 = Moderate depression, 15-19 = Moderately severe depression, 20-27 = Severe depression        1. Patient given educational materials - see patient instructions  2. Discussed use, benefit, and side effects of prescribed medications.   Barriers to medication

## 2017-10-09 NOTE — PROGRESS NOTES
Chronic Disease Visit Information    BP Readings from Last 3 Encounters:   10/09/17 (!) 180/78   09/01/17 (!) 126/56   08/30/17 130/82          Hemoglobin A1C (%)   Date Value   07/18/2016 5.7   08/06/2015 5.5     Microalb/Crt. Ratio (mcg/mg creat)   Date Value   08/05/2015 11     LDL Cholesterol (mg/dL)   Date Value   07/01/2017 41     HDL (mg/dL)   Date Value   07/01/2017 63     BUN (mg/dL)   Date Value   09/25/2017 34 (H)     CREATININE (mg/dL)   Date Value   09/25/2017 1.02 (H)     Glucose (mg/dL)   Date Value   09/25/2017 112 (H)            Have you changed or started any medications since your last visit including any over-the-counter medicines, vitamins, or herbal medicines? no   Are you having any side effects from any of your medications? -  no  Have you stopped taking any of your medications? Is so, why? -  no    Have you seen any other physician or provider since your last visit? No  Have you had any other diagnostic tests since your last visit? Yes - Records Obtained  Have you been seen in the emergency room and/or had an admission to a hospital since we last saw you? No  Have you had your annual diabetic retinal (eye) exam? No  Have you had your routine dental cleaning in the past 6 months? no    Have you activated your [employer] account? If not, what are your barriers?  Yes     Patient Care Team:  Tiffany Desai MD as PCP - Jaleesa Schwartz MD as PCP - S Attributed Provider         Medical History Review  Past Medical, Family, and Social History reviewed and does contribute to the patient presenting condition  Chief Complaint   Patient presents with    Hypertension     elevated today     Edema     follow up on labs      Health Maintenance   Topic Date Due    Hepatitis C screen  1950    Flu vaccine (1) 11/02/2017 (Originally 9/1/2017)    DTaP/Tdap/Td vaccine (1 - Tdap) 11/15/2017 (Originally 1/18/1970)    Diabetes screen  07/18/2019    Breast cancer screen  08/04/2019    Lipid screen  07/01/2022    Colon cancer screen colonoscopy  10/07/2026    Zostavax vaccine  Addressed    DEXA (modify frequency per FRAX score)  Completed    Pneumococcal low/med risk  Completed

## 2017-10-13 ENCOUNTER — HOSPITAL ENCOUNTER (OUTPATIENT)
Dept: GENERAL RADIOLOGY | Age: 67
Discharge: HOME | End: 2017-10-13
Payer: MEDICARE

## 2017-10-13 ENCOUNTER — HOSPITAL ENCOUNTER (OUTPATIENT)
Dept: PAIN MANAGEMENT | Age: 67
Discharge: HOME | End: 2017-10-13
Payer: MEDICARE

## 2017-10-13 VITALS
SYSTOLIC BLOOD PRESSURE: 155 MMHG | BODY MASS INDEX: 30.73 KG/M2 | DIASTOLIC BLOOD PRESSURE: 70 MMHG | OXYGEN SATURATION: 96 % | HEIGHT: 64 IN | WEIGHT: 180 LBS | HEART RATE: 55 BPM | TEMPERATURE: 98.6 F | RESPIRATION RATE: 16 BRPM

## 2017-10-13 DIAGNOSIS — M47.816 FACET ARTHRITIS OF LUMBAR REGION: ICD-10-CM

## 2017-10-13 DIAGNOSIS — M47.816 SPONDYLOSIS OF LUMBAR REGION WITHOUT MYELOPATHY OR RADICULOPATHY: Primary | ICD-10-CM

## 2017-10-13 DIAGNOSIS — R52 PAIN: ICD-10-CM

## 2017-10-13 DIAGNOSIS — M51.36 LUMBAR DEGENERATIVE DISC DISEASE: ICD-10-CM

## 2017-10-13 PROCEDURE — 64493 INJ PARAVERT F JNT L/S 1 LEV: CPT | Performed by: PAIN MEDICINE

## 2017-10-13 PROCEDURE — 64493 INJ PARAVERT F JNT L/S 1 LEV: CPT

## 2017-10-13 PROCEDURE — 2500000003 HC RX 250 WO HCPCS

## 2017-10-13 PROCEDURE — 64495 INJ PARAVERT F JNT L/S 3 LEV: CPT

## 2017-10-13 PROCEDURE — 6360000004 HC RX CONTRAST MEDICATION

## 2017-10-13 PROCEDURE — 3209999900 FLUORO FOR SURGICAL PROCEDURES

## 2017-10-13 PROCEDURE — 64495 INJ PARAVERT F JNT L/S 3 LEV: CPT | Performed by: PAIN MEDICINE

## 2017-10-13 PROCEDURE — 64494 INJ PARAVERT F JNT L/S 2 LEV: CPT | Performed by: PAIN MEDICINE

## 2017-10-13 PROCEDURE — 64494 INJ PARAVERT F JNT L/S 2 LEV: CPT

## 2017-10-13 RX ORDER — HYDROCODONE BITARTRATE AND ACETAMINOPHEN 5; 325 MG/1; MG/1
1 TABLET ORAL EVERY 12 HOURS PRN
Qty: 60 TABLET | Refills: 0 | Status: SHIPPED | OUTPATIENT
Start: 2017-10-18 | End: 2017-10-30 | Stop reason: SDUPTHER

## 2017-10-13 ASSESSMENT — PAIN DESCRIPTION - DESCRIPTORS: DESCRIPTORS: CONSTANT;BURNING

## 2017-10-13 ASSESSMENT — PAIN - FUNCTIONAL ASSESSMENT: PAIN_FUNCTIONAL_ASSESSMENT: 0-10

## 2017-10-13 ASSESSMENT — PAIN SCALES - GENERAL: PAINLEVEL_OUTOF10: 0

## 2017-10-13 NOTE — PROCEDURES
Pre-Procedure Note    Patient Name: Cecille Villegas   YOB: 1950  Room/Bed: Room/bed info not found  Medical Record Number: [id number]  Date: 10/13/2017       Indication:    1. Spondylosis of lumbar region without myelopathy or radiculopathy    2. Facet arthritis of lumbar region ([address]    3. Lumbar degenerative disc disease        Consent: On file. Vital Signs:   Vitals:    10/13/17 0835   BP: (!) 172/80   Pulse: 54   Resp: 12   Temp:    SpO2: 96%       Past Medical History:   has a past medical history of Allergic rhinitis, cause unspecified; Back pain; Bowel obstruction; CAD (coronary artery disease); Cellulitis; Diverticulosis of colon (without mention of hemorrhage); GERD (gastroesophageal reflux disease); History of MI (myocardial infarction); History of ovarian cyst; History of peritonitis; HTN (hypertension); Hyperlipidemia; Intestinal or peritoneal adhesions with obstruction (postoperative) (postinfection); Kidney infection; Lateral epicondylitis  of elbow; Muscle strain; Other abnormal glucose; Restless legs syndrome (RLS); Vitamin D deficiency; and Wears glasses. Past Surgical History:   has a past surgical history that includes Cardiac catheterization; Ovary removal (1970); colectomy (1969); Appendectomy (1968); Ovary removal (1971); Hysterectomy (1973); Abdomen surgery (1976); Cardiac catheterization (2005); Bunionectomy (Left); sinus surgery (2004); Colonoscopy; other surgical history (8/14/14); and cyst removal (Right). Pre-Sedation Documentation and Exam:   Vital signs have been reviewed (see flow sheet for vitals). Mallampati Airway Assessment:  Mallampati Class I - (soft palate, fauces, uvula & anterior/posterior tonsillar pillars are visible)    ASA Classification:  Class 3 - A patient with severe systemic disease that limits activity but is not incapacitating    Sedation/ Anesthesia Plan:   intravenous sedation  as needed.     Medications Planned:   midazolam (Versed) / physical actiivity)  Pain Intervention(s): Medication (see eMar), Rest  Response to Pain Intervention:  (6/20/17 Left lumbar facet L1-2 thru L5 S1 50%)   Procedure:       Patient's vital signs including BP, EKG and SaO2 were monitored by the RN and they remained stable during the procedure. A meaningful communication was kept up with the patient throughout the procedure. Patient is placed in prone position and skin over  the back was prepped and draped in sterile manner. Then using fluoroscopy the junction of the transverse process of the vertebra with the superior process of the facet joint was observed and the view was optimized. The skin and deep tissues posterior were infiltrated with 1 ml of  0.5% Marcaine. . Then a #22-gauge 3-1/2 / 5-1/2 inch spinal needle was introduced through the skin wheal under fluoroscopy guidance such that the tip of the needle lies at the junction of the transverse process with the superior processes of the facet joint. Then after negative aspiration about 0.5 ml of Omnipaque 180 was injected through the needle and spread of the contrast along the junction was observed. Then after negative aspiration a total of 0.5 ml of 0.5% Marcaine was injected through the needle. This was done at the levels of L1, L2, L3, L4, L5, S1, and T12  For L5 Median branch block the junction of the ala of  the sacrum with the superior articular process of the facet joint was taken as a reference point and L4 median branch the junction of the transverse process the L5 with the superolateral possible facet joint was taken to the point and healthy median branch the junction of the transverse process of L4 with the superior lateral process of the facet joint was taken as a reference point. For S1 median branch the most lateral and superior aspect of S1 foramina was taken as a reference point. The medial branches and the other levels were done in a similar fashion.  For T12 medial branch the junction of

## 2017-10-16 ENCOUNTER — TELEPHONE (OUTPATIENT)
Dept: PAIN MANAGEMENT | Age: 67
End: 2017-10-16

## 2017-10-30 ENCOUNTER — HOSPITAL ENCOUNTER (OUTPATIENT)
Dept: PAIN MANAGEMENT | Age: 67
Discharge: HOME | End: 2017-10-30
Payer: MEDICARE

## 2017-10-30 VITALS
HEIGHT: 64 IN | RESPIRATION RATE: 17 BRPM | WEIGHT: 180 LBS | TEMPERATURE: 97.6 F | DIASTOLIC BLOOD PRESSURE: 72 MMHG | BODY MASS INDEX: 30.73 KG/M2 | OXYGEN SATURATION: 95 % | SYSTOLIC BLOOD PRESSURE: 155 MMHG | HEART RATE: 61 BPM

## 2017-10-30 DIAGNOSIS — Z51.81 MEDICATION MONITORING ENCOUNTER: ICD-10-CM

## 2017-10-30 DIAGNOSIS — G57.10 MERALGIA PARESTHETICA, UNSPECIFIED LATERALITY: ICD-10-CM

## 2017-10-30 DIAGNOSIS — M47.816 SPONDYLOSIS OF LUMBAR REGION WITHOUT MYELOPATHY OR RADICULOPATHY: Primary | ICD-10-CM

## 2017-10-30 DIAGNOSIS — M16.11 PRIMARY OSTEOARTHRITIS OF RIGHT HIP: ICD-10-CM

## 2017-10-30 DIAGNOSIS — G57.11 MERALGIA PARAESTHETICA, RIGHT: ICD-10-CM

## 2017-10-30 DIAGNOSIS — M47.816 FACET ARTHRITIS OF LUMBAR REGION: ICD-10-CM

## 2017-10-30 DIAGNOSIS — M51.36 LUMBAR DEGENERATIVE DISC DISEASE: ICD-10-CM

## 2017-10-30 PROCEDURE — 99213 OFFICE O/P EST LOW 20 MIN: CPT

## 2017-10-30 PROCEDURE — 99214 OFFICE O/P EST MOD 30 MIN: CPT | Performed by: PAIN MEDICINE

## 2017-10-30 RX ORDER — HYDROCODONE BITARTRATE AND ACETAMINOPHEN 5; 325 MG/1; MG/1
1 TABLET ORAL EVERY 12 HOURS PRN
Qty: 60 TABLET | Refills: 0 | Status: SHIPPED | OUTPATIENT
Start: 2017-11-10 | End: 2017-11-29 | Stop reason: SDUPTHER

## 2017-10-30 ASSESSMENT — PAIN DESCRIPTION - PAIN TYPE: TYPE: CHRONIC PAIN

## 2017-10-30 ASSESSMENT — ENCOUNTER SYMPTOMS
BLURRED VISION: 0
NAUSEA: 0
VOMITING: 0
GASTROINTESTINAL NEGATIVE: 1
EYES NEGATIVE: 1
WHEEZING: 1
SORE THROAT: 0
COUGH: 0
SHORTNESS OF BREATH: 0
HEARTBURN: 0
BACK PAIN: 1

## 2017-10-30 ASSESSMENT — PAIN DESCRIPTION - ORIENTATION: ORIENTATION: RIGHT;LEFT

## 2017-10-30 ASSESSMENT — PAIN DESCRIPTION - LOCATION: LOCATION: BACK;HIP;BUTTOCKS

## 2017-10-30 ASSESSMENT — PAIN SCALES - GENERAL: PAINLEVEL_OUTOF10: 4

## 2017-10-30 ASSESSMENT — PAIN DESCRIPTION - DESCRIPTORS: DESCRIPTORS: ACHING;BURNING;DULL

## 2017-10-30 ASSESSMENT — PAIN DESCRIPTION - FREQUENCY: FREQUENCY: INTERMITTENT

## 2017-10-30 ASSESSMENT — PAIN DESCRIPTION - ONSET: ONSET: ON-GOING

## 2017-10-30 NOTE — PROGRESS NOTES
[address] [facility] Pain Management  Patient Pain Assessment  Consultation - No att. providers found    Primary Care Physician: Siomara Gomez MD    Chief complaint:   Chief Complaint   Patient presents with    Back Pain     right buttock/hip worse than left   . HISTORY OF PRESENT ILLNESS:  Hubert Villanueva is 78 y.o. female with    Patient returned to the pain clinic with a chief complaint of pain involving the low back. The pain radiates across the back worse on the right side. The pain radiates to the right hip and buttock regions. Patient had undergone medial branch blocks at the levels of T12, L1, L2, L3, L4, L5, and S1 on 10/13/2017. Patient reports about 60-70% improvement in the pain in the low back area. Patient reports she still had pain in her right buttock region. She would like to undergo RFA. Patient reports she has better control of the pain with the medications now. Patient reports her pain is a minimal with sitting but increases with activity to up to 7 on a scale of 0-10 she also reports she is taking less pain medication now since our last procedure. Back Pain   This is a chronic problem. The current episode started more than 1 year ago. The problem has been gradually worsening since onset. The pain is present in the lumbar spine and sacro-iliac. The quality of the pain is described as aching and burning. The pain does not radiate (Rt. Groin). The pain is at a severity of 4/10 (0-7.). The pain is mild. The pain is worse during the day. The symptoms are aggravated by bending, standing, twisting and coughing (walking. , Lifting). Stiffness is present: as day progress. Associated symptoms include tingling. Pertinent negatives include no chest pain, dysuria, fever, headaches or weight loss. Risk factors include sedentary lifestyle, lack of exercise and obesity (smoke). She has tried chiropractic manipulation (Lumbar facet joint injections on the right side) for the symptoms.  The treatment provided significant relief. Patient relates current medications are helping the pain. Patient reports taking pain medications as prescribed, denies obtaining medications from different sources and denies use of illegal drugs. Patient denies side effects from medications like nausea, vomiting, constipation or drowsiness. Patient reports current activities of daily living ar possible due to medications and would like to continue them. OARRS compliant? yes  Concern for prescription abuse?no    Current Pain Assessment  Pain Assessment  Pain Assessment: 0-10  Pain Level: 4  Pain Type: Chronic pain  Pain Location: Back, Hip, Buttocks  Pain Orientation: Right, Left  Pain Radiating Towards: right buttock/hip worse than left  Pain Descriptors: Aching, Burning, Dull (electric occ.  in thighs/knees)  Pain Frequency: Intermittent  Pain Onset: On-going  Effect of Pain on Daily Activities: Pain increases w/walking & physical activiity  Patient's Stated Pain Goal: 2 (To be able to walk & do physical activiity w/less pain)  Pain Intervention(s): Medication (see eMar), Repositioned, Rest, Cold applied  Response to Pain Intervention:  (Lumbar MBB Right L1, L2, L4, L5, S1 T12)                    ADVERSE MEDICATION EFFECTS:   Constipation: no  Bowel Regimen: Yes  Diet: common adult  Appetite:  ok  Sedation:  no  Urinary Retention: no    FOCUSED PAIN SCALE:  Highest : 7  Lowest :zero when sitting  Average: Range-2  When and What  was your last procedure:   10/13/17 Lumbar MBB L1,L,2,L4,L5,S1,T12  Was your procedure effective:  Yes, 50%    ACTIVITY/SOCIAL/EMOTIONAL:  Sleep Pattern: 5 hours per night. nightime awakenings  Energy Level:  Tired/Fatigued  Currently attending Physical Therapy:  No  Home Exercises: daily core, leg  Mobility: walking increases the pain  Currently seeing a Psychiatrist or Psychologist:  No  Emotional Issues: normal   Mood: depressed , lost son in Sept. , lost sister in Dec.    [facility] of recurring cysts    OTHER SURGICAL HISTORY  8/14/14    FESS    OVARY REMOVAL  1970    UNILATERAL due to cyst    OVARY REMOVAL  1971    partial, due to cyst    SINUS SURGERY  2004       Medications  Current Outpatient Prescriptions   Medication Sig Dispense Refill    [START ON 11/10/2017] HYDROcodone-acetaminophen (NORCO) 5-325 MG per tablet Take 1 tablet by mouth every 12 hours as needed for Pain . Earliest Fill Date: 11/10/17 60 tablet 0    lisinopril (PRINIVIL;ZESTRIL) 20 MG tablet Take 2 tablets by mouth daily 90 tablet 3    pramipexole (MIRAPEX) 1 MG tablet Take 1.5 mg by mouth daily      furosemide (LASIX) 20 MG tablet Take 1 tablet by mouth daily 60 tablet 3    apixaban (ELIQUIS) 5 MG TABS tablet Take 1 tablet by mouth 2 times daily 60 tablet 5    citalopram (CELEXA) 10 MG tablet Take 1 tablet by mouth daily 30 tablet 3    fenofibrate micronized (LOFIBRA) 134 MG capsule Take 1 capsule by mouth every morning (before breakfast) 90 capsule 3    atorvastatin (LIPITOR) 80 MG tablet Take 1 tablet by mouth nightly 90 tablet 3    cyclobenzaprine (FLEXERIL) 10 MG tablet Take 1 tablet by mouth 3 times daily as needed for Muscle spasms 270 tablet 3    famotidine (PEPCID) 20 MG tablet Take 1 tablet by mouth 2 times daily 180 tablet 3    vitamin D (CHOLECALCIFEROL) 1000 UNIT TABS tablet Take 1 tablet by mouth daily 90 tablet 3    nitroGLYCERIN (NITROSTAT) 0.4 MG SL tablet Place 1 tablet under the tongue every 5 minutes as needed for Chest pain 75 tablet 3    aspirin 81 MG chewable tablet Take 81 mg by mouth daily      Calcium Carbonate-Vitamin D (CALCIUM 500/D) 500-125 MG-UNIT TABS Take 1 tablet by mouth 2 times daily        No current facility-administered medications for this encounter.         Allergies  Bactrim [sulfamethoxazole-trimethoprim] and Codeine    Family History  family history includes Diabetes in her mother; Heart Disease in her brother, father, maternal grandmother, and mother; High Blood Pressure in her brother, mother, and sister; Stroke in her mother. Social History  Social History     Social History    Marital status:      Spouse name: N/A    Number of children: N/A    Years of education: N/A     Occupational History    retired      Social History Main Topics    Smoking status: Former Smoker     Packs/day: 0.50     Years: 20.00     Types: Cigarettes     Start date: 8/11/1995    Smokeless tobacco: Never Used      Comment: 6-20-17 quit 10 days ago    Alcohol use 0.0 oz/week      Comment: occasional    Drug use: No    Sexual activity: Not Asked     Other Topics Concern    None     Social History Narrative    None      reports that she does not use drugs. REVIEW OF SYSTEMS:  Review of Systems   Constitutional: Negative. Negative for fever, malaise/fatigue and weight loss. HENT: Negative. Negative for congestion, hearing loss, sore throat and tinnitus. Eyes: Negative. Negative for blurred vision and photophobia. Respiratory: Positive for wheezing. Negative for cough and shortness of breath. Cardiovascular: Positive for leg swelling (left leg more edema than right leg). Negative for chest pain and palpitations. Gastrointestinal: Negative. Negative for constipation, heartburn, nausea and vomiting. Genitourinary: Negative. Negative for dysuria and hematuria. Musculoskeletal: Positive for back pain and joint pain (bilat. hips). Negative for falls. Skin: Positive for itching (left leg). Negative for rash. Neurological: Positive for tingling. Negative for dizziness, tremors, sensory change, speech change, focal weakness, seizures and headaches. Endo/Heme/Allergies: Bruises/bleeds easily. Psychiatric/Behavioral: Positive for depression and memory loss. Negative for substance abuse and suicidal ideas. The patient is not nervous/anxious and does not have insomnia.              GENERAL PHYSICAL EXAM:  Vitals: BP (!) 155/72   Pulse 61   Temp 97.6 °F (36.4 °C) (Oral)   Resp 17   Ht 5' 4\" (1.626 m)   Wt 180 lb (81.6 kg)   SpO2 95%   BMI 30.90 kg/m² , Body mass index is 30.9 kg/m². Physical Exam   Constitutional: She is oriented to person, place, and time. She appears well-developed and well-nourished. HENT:   Head: Normocephalic and atraumatic. Eyes: Conjunctivae and EOM are normal. Pupils are equal, round, and reactive to light. Neck: Normal range of motion. Neck supple. No thyromegaly present. Cardiovascular: Normal rate and regular rhythm. Pulmonary/Chest: Effort normal. No respiratory distress. Abdominal: She exhibits no distension. Musculoskeletal: She exhibits edema. She exhibits no deformity. 0-1+ edema of feet   Lymphadenopathy:     She has no cervical adenopathy. Neurological: She is alert and oriented to person, place, and time. She displays no atrophy and no tremor. No cranial nerve deficit or sensory deficit. She exhibits normal muscle tone. Coordination normal.   Reflex Scores:       Patellar reflexes are 1+ on the right side and 1+ on the left side. Achilles reflexes are 1+ on the right side and 1+ on the left side. Skin:        Psychiatric: She has a normal mood and affect. Her speech is normal and behavior is normal. Judgment and thought content normal. Cognition and memory are normal.    Right Ankle Exam     Muscle Strength   The patient has normal right ankle strength. Left Ankle Exam     Muscle Strength   The patient has normal left ankle strength. Right Knee Exam     Muscle Strength     The patient has normal right knee strength. Left Knee Exam     Muscle Strength     The patient has normal left knee strength. Right Hip Exam     Muscle Strength   The patient has normal right hip strength. Left Hip Exam     Muscle Strength   The patient has normal left hip strength. Back Exam     Tenderness   The patient is experiencing tenderness in the lumbar and sacroiliac.     Range of Motion Back extension: Limited and painful. Back flexion: Limited and painful. Back lateral bend right: Limited and painful. Back lateral bend left: Limited and painful. Back rotation right: Limited and painful. Back rotation left: Limited and painful. Tests   Straight leg raise right: negative  Straight leg raise left: negative    Other   Sensation: normal  Gait: antalgic   Erythema: no back redness  Scars: absent    Comments:   Palpation of the lumbar spine reveal a less tenderness compared to the previous examination. Spinous process are in the midline without gross deviations alignment of the shoulder scapula and iliac crests are symmetrical  Palpation didn't reveal tenderness over the facet joints especially in the lower lumbar region. Facet loading is positive bilaterally more so on the right compared to the left. Nurses Notes and Vital Signs reviewed. DATA  Labs:  Benzodiazepine Screen, Urine   Date Value Ref Range Status   08/05/2015 NEGATIVE NEG Final     Comment:           (Positive cutoff 200 ng/mL)                      Imaging:  Radiology Images and Reports reviewed where indicated and necessary  Technique: Multiplanar multisequence MR imaging of the lumbar spine was performed without intravenous contrast.       Findings: Minimal anterolisthesis of L3 on L4. Visualized bowel cord demonstrates no evidence for cord edema. Conus terminus at approximately L1-L2 level. Cauda equina nerve roots follow spinal curvature.       Disc desiccation all levels.  Slight heterogeneity of the marrow signal on T1-weighted imaging without evidence for marrow edema to suggest acute fractures.       L1-L2 level: Bilateral facet arthrosis with broad-based is bulge noted resulting in mild deformity and flattening of the ventral thecal sac with mild bilateral neuroforaminal narrowing       L2-L3 level: Mild facet arthrosis with ligamentum flavum thickening and broad-based is bulge without significant central canal stenosis or neuroforaminal narrowing.       L3-L4 level: Bilateral facet hypertrophy with ligamentum flavum thickening and broad-based is bulge with mild bilateral neuroforaminal narrowing without significant central canal stenosis.       L4-L5 level: Bilateral facet arthrosis with ligamentum flavum thickening and right-sided facet joint effusion and a broad-based disc bulge with mild edema in the right pedicle of L4 vertebral body likely relate with facet joint degenerative changes with    mild bilateral neuroforaminal narrowing and mild central canal stenosis.       L5-S1 level: Bilateral facet arthrosis with broad-based is bulge resulting in mild bilateral neuroforaminal narrowing without significant central canal stenosis.       Mild atrophy of the paraspinous muscles.       Impression: Lumbar spondylotic changes with mild central canal stenosis L4-5 level. Please see above level by level complete analysis and details       Final report electronically signed by Kortney Kovacs M.D. on 8/20/2016        ASSESSMENT    Tanesha Greene is a 78 y.o. female with   1. Spondylosis of lumbar region without myelopathy or radiculopathy    2. Facet arthritis of lumbar region ([address]    3. Lumbar degenerative disc disease    4. Medication monitoring encounter    5. Meralgia paraesthetica, right    6. Primary osteoarthritis of right hip    7.  Meralgia paresthetica, unspecified laterality          Patient Active Problem List   Diagnosis    Other specified disorders of rotator cuff syndrome of shoulder and allied disorders    Diverticulosis of large intestine    Intestinal or peritoneal adhesions with obstruction (postoperative) (postinfection)    Restless legs syndrome (RLS)    GERD (gastroesophageal reflux disease)    Essential hypertension    Mixed hyperlipidemia    Other abnormal glucose    Atherosclerosis    Lateral epicondylitis    Allergic rhinitis    Vitamin D deficiency    Depression    Sinusitis  Degenerative joint disease (DJD) of hip    Edema extremities    Injury of foot, left    Facial droop    [facility]cere[facility] vascular accident) [facility]    BIN (acute kidney injury) ([facility]    Bradycardia    Ataxia    Aphasia    TIA (transient ischemic attack)    Need for prophylactic vaccination and inoculation against cholera alone    Chest pain    Osteopenia    Lumbago    Facet arthritis of lumbar region ([facility]    Meralgia paraesthetica    Lumbar degenerative disc disease    Spondylosis of lumbar region without myelopathy or radiculopathy    Blood poisoning ([facility]    Cellulitis of left lower extremity    Medication monitoring encounter    Deep vein thrombosis (DVT) of right lower extremity ([facility]               PLAN    We will continue current pain medications  Current medications are being tolerated without any Adverse side effects. Orders Placed This Encounter   Medications    HYDROcodone-acetaminophen (NORCO) 5-325 MG per tablet     Sig: Take 1 tablet by mouth every 12 hours as needed for Pain . Earliest Fill Date: 11/10/17     Dispense:  60 tablet     Refill:  0     Urine drug screens have been appropriate. No aberrant activity noted. Analgesia is achieved. Activities of daily living are possible because of medications. Safe use of medications explained to patient. Counselling/Preventive measures for pain  Control:    [x]  Spine strengthening exercises are discussed with patient in detail. [x] Ill effects of being on chronic pain medications such as sleep disturbances, hormonal changes, withdrawal symptoms,  chronic opioid dependence and tolerance were discussed with patient. I had asked the patient to minimize medication use and utilize pain medications only for uncontrolled rest pain or pain with exertional activities. I advised patient not to self escalate pain medications without consulting with us.   At each of patient's future visits we will try to taper pain medications, while

## 2017-10-31 ASSESSMENT — ENCOUNTER SYMPTOMS
CONSTIPATION: 0
PHOTOPHOBIA: 0

## 2017-11-06 ENCOUNTER — HOSPITAL ENCOUNTER (OUTPATIENT)
Age: 67
Setting detail: SPECIMEN
Discharge: HOME | End: 2017-11-06
Payer: MEDICARE

## 2017-11-06 DIAGNOSIS — N17.9 AKI (ACUTE KIDNEY INJURY) (HCC): ICD-10-CM

## 2017-11-06 LAB
ANION GAP SERPL CALCULATED.3IONS-SCNC: 14 MMOL/L (ref 9–17)
BUN BLDV-MCNC: 27 MG/DL (ref 8–23)
BUN/CREAT BLD: ABNORMAL (ref 9–20)
CALCIUM SERPL-MCNC: 9.3 MG/DL (ref 8.6–10.4)
CHLORIDE BLD-SCNC: 105 MMOL/L (ref 98–107)
CO2: 26 MMOL/L (ref 20–31)
CREAT SERPL-MCNC: 0.8 MG/DL (ref 0.5–0.9)
GFR AFRICAN AMERICAN: >60 ML/MIN
GFR NON-AFRICAN AMERICAN: >60 ML/MIN
GFR SERPL CREATININE-BSD FRML MDRD: ABNORMAL ML/MIN/{1.73_M2}
GFR SERPL CREATININE-BSD FRML MDRD: ABNORMAL ML/MIN/{1.73_M2}
GLUCOSE BLD-MCNC: 101 MG/DL (ref 70–99)
POTASSIUM SERPL-SCNC: 4.6 MMOL/L (ref 3.7–5.3)
SODIUM BLD-SCNC: 145 MMOL/L (ref 135–144)

## 2017-11-14 ENCOUNTER — HOSPITAL ENCOUNTER (OUTPATIENT)
Dept: PAIN MANAGEMENT | Age: 67
Discharge: HOME | End: 2017-11-14
Payer: MEDICARE

## 2017-11-14 ENCOUNTER — HOSPITAL ENCOUNTER (OUTPATIENT)
Dept: GENERAL RADIOLOGY | Age: 67
Discharge: HOME | End: 2017-11-14
Payer: MEDICARE

## 2017-11-14 VITALS
RESPIRATION RATE: 17 BRPM | OXYGEN SATURATION: 96 % | TEMPERATURE: 97.8 F | DIASTOLIC BLOOD PRESSURE: 78 MMHG | HEART RATE: 57 BPM | WEIGHT: 180 LBS | BODY MASS INDEX: 30.73 KG/M2 | SYSTOLIC BLOOD PRESSURE: 155 MMHG | HEIGHT: 64 IN

## 2017-11-14 DIAGNOSIS — M51.36 LUMBAR DEGENERATIVE DISC DISEASE: ICD-10-CM

## 2017-11-14 DIAGNOSIS — R52 PAIN: ICD-10-CM

## 2017-11-14 DIAGNOSIS — M47.816 SPONDYLOSIS OF LUMBAR REGION WITHOUT MYELOPATHY OR RADICULOPATHY: Primary | ICD-10-CM

## 2017-11-14 DIAGNOSIS — M47.816 FACET ARTHRITIS OF LUMBAR REGION: ICD-10-CM

## 2017-11-14 PROCEDURE — 64635 DESTROY LUMB/SAC FACET JNT: CPT | Performed by: PAIN MEDICINE

## 2017-11-14 PROCEDURE — 6360000002 HC RX W HCPCS

## 2017-11-14 PROCEDURE — 3209999900 FLUORO FOR SURGICAL PROCEDURES

## 2017-11-14 PROCEDURE — 64635 DESTROY LUMB/SAC FACET JNT: CPT

## 2017-11-14 PROCEDURE — 64636 DESTROY L/S FACET JNT ADDL: CPT | Performed by: PAIN MEDICINE

## 2017-11-14 PROCEDURE — 2500000003 HC RX 250 WO HCPCS

## 2017-11-14 PROCEDURE — 64636 DESTROY L/S FACET JNT ADDL: CPT

## 2017-11-14 PROCEDURE — 6360000002 HC RX W HCPCS: Performed by: PAIN MEDICINE

## 2017-11-14 RX ORDER — MIDAZOLAM HYDROCHLORIDE 1 MG/ML
INJECTION INTRAMUSCULAR; INTRAVENOUS
Status: COMPLETED | OUTPATIENT
Start: 2017-11-14 | End: 2017-11-14

## 2017-11-14 RX ORDER — FENTANYL CITRATE 50 UG/ML
INJECTION, SOLUTION INTRAMUSCULAR; INTRAVENOUS
Status: COMPLETED | OUTPATIENT
Start: 2017-11-14 | End: 2017-11-14

## 2017-11-14 RX ADMIN — MIDAZOLAM 2 MG: 1 INJECTION INTRAMUSCULAR; INTRAVENOUS at 08:22

## 2017-11-14 RX ADMIN — FENTANYL CITRATE 25 MCG: 50 INJECTION INTRAMUSCULAR; INTRAVENOUS at 08:37

## 2017-11-14 ASSESSMENT — PAIN - FUNCTIONAL ASSESSMENT
PAIN_FUNCTIONAL_ASSESSMENT: 0-10

## 2017-11-14 ASSESSMENT — PAIN DESCRIPTION - DESCRIPTORS: DESCRIPTORS: ACHING;BURNING;DULL

## 2017-11-14 NOTE — PROCEDURES
Pre-Procedure Note    Patient Name: Cecille Villegas   YOB: 1950  Room/Bed: Room/bed info not found  Medical Record Number: [id number]  Date: 11/14/2017       Indication:    1. Spondylosis of lumbar region without myelopathy or radiculopathy    2. Facet arthritis of lumbar region ([facility]    3. Lumbar degenerative disc disease        Consent: On file. Vital Signs:   Vitals:    11/14/17 0907   BP: (!) 170/72   Pulse: 57   Resp: 15   Temp:    SpO2: 97%       Past Medical History:   has a past medical history of Allergic rhinitis, cause unspecified; Back pain; Bowel obstruction; CAD (coronary artery disease); Cellulitis; Cellulitis; Diverticulosis of colon (without mention of hemorrhage); GERD (gastroesophageal reflux disease); History of MI (myocardial infarction); History of ovarian cyst; History of peritonitis; HTN (hypertension); Hx of blood clots; Hyperlipidemia; Intestinal or peritoneal adhesions with obstruction (postoperative) (postinfection); Kidney infection; Lateral epicondylitis  of elbow; Muscle strain; Other abnormal glucose; Restless legs syndrome (RLS); Vitamin D deficiency; and Wears glasses. Past Surgical History:   has a past surgical history that includes Cardiac catheterization; Ovary removal (1970); colectomy (1969); Appendectomy (1968); Ovary removal (1971); Hysterectomy (1973); Abdomen surgery (1976); Cardiac catheterization (2005); Bunionectomy (Left); sinus surgery (2004); Colonoscopy; other surgical history (8/14/14); and cyst removal (Right). Pre-Sedation Documentation and Exam:   Vital signs have been reviewed (see flow sheet for vitals). Mallampati Airway Assessment:  normal    ASA Classification:  Class 3 - A patient with severe systemic disease that limits activity but is not incapacitating    Sedation/ Anesthesia Plan:   intravenous sedation   as needed. Medications Planned:   midazolam (Versed) / Fentanyl  Intravenously  as needed.     Patient is an appropriate candidate for plan of sedation: yes  Patient's History and Physical examination was reviewed and there is no change. Electronically signed by Nadia James MD on 11/14/2017 at 9:17 AM      Preoperative Diagnosis:    1. Spondylosis of lumbar region without myelopathy or radiculopathy    2. Facet arthritis of lumbar region ([address]    3. Lumbar degenerative disc disease        Postoperative Diagnosis:    1. Spondylosis of lumbar region without myelopathy or radiculopathy    2. Facet arthritis of lumbar region ([address]    3. Lumbar degenerative disc disease        Procedure Performed:  :Radiofrequency ablation of median branches at the levels of L2, L3, L4, [id number] under fluoroscopic guidance with IV sedation. Indication for the Procedure: The patient has ahistory of chronic low back pain that is not responding well to the conservative treatment. Patient's pain is mostly axial in nature. Pain is interfering with the activities of daily living. Physical examination revealed facet tenderness and facet loading is positive. Patient had undergone lumbar facet joint injections with pain relief that lasted for only a short period of time and had greater than 70% pain relief with confirmatory median branch blocks. Hence we decided to do radiofrequency abalation of median branches for long term pain releif. The procedure and risks  were discussed with the patient and an informed consent was obtained.   Current Pain Assessment  Pain Assessment  Pain Assessment: 0-10  Pain Level: 4  Pain Type: Chronic pain  Pain Location: Back, Buttocks  Pain Orientation: Right, Left  Pain Radiating Towards: right buttock worse than left  Pain Descriptors: Aching, Burning, Dull  Pain Frequency: Intermittent  Pain Onset: On-going  Clinical Progression: Gradually worsening  Effect of Pain on Daily Activities: Pain increases w/walking & physical activiity  Patient's Stated Pain Goal: 2 (To be able walk & do physical activiity w/less theaspect of the groove. A total of 10 mg.of triamcinolone and 1 ml 0.5% Marcaine was used. This was done at the levels of L2, L3, L4, L5 on the right side in similar fashion. For L5 median branch block the junction of the ala of  the sacrum with the superior articular process of the facet joint was taken as a reference point. For the L4 median branch the junction of the transverse process of L5 with the superolateral possible facet joint was taken as a reference point . For L3 median branch the junction of L4 transverse process and superior articular process of facet joint was taken as reference point and for L2 medial branch junction of the transverse process of for L3 with the superior ocular process of the facet joint was taken as a reference point. Patient's vital signs and neurological status remained stable throughout the procedure and post procedural period. The patient tolerated the procedure well and was discharged home in stable condition.     Electronically signed by Stuart Hill MD on 11/14/2017 at 9:17 AM

## 2017-11-17 ENCOUNTER — OFFICE VISIT (OUTPATIENT)
Dept: INTERNAL MEDICINE CLINIC | Age: 67
End: 2017-11-17
Payer: MEDICARE

## 2017-11-17 VITALS
DIASTOLIC BLOOD PRESSURE: 74 MMHG | BODY MASS INDEX: 31.07 KG/M2 | WEIGHT: 182 LBS | HEIGHT: 64 IN | SYSTOLIC BLOOD PRESSURE: 138 MMHG

## 2017-11-17 DIAGNOSIS — I50.32 CHRONIC DIASTOLIC CONGESTIVE HEART FAILURE (HCC): ICD-10-CM

## 2017-11-17 DIAGNOSIS — I10 ESSENTIAL HYPERTENSION: Primary | ICD-10-CM

## 2017-11-17 DIAGNOSIS — I82.401 DEEP VEIN THROMBOSIS (DVT) OF RIGHT LOWER EXTREMITY, UNSPECIFIED CHRONICITY, UNSPECIFIED VEIN (HCC): ICD-10-CM

## 2017-11-17 DIAGNOSIS — M94.9 DISORDER OF CARTILAGE: ICD-10-CM

## 2017-11-17 DIAGNOSIS — N17.9 AKI (ACUTE KIDNEY INJURY) (HCC): ICD-10-CM

## 2017-11-17 DIAGNOSIS — Z01.89 ENCOUNTER FOR OTHER SPECIFIED SPECIAL EXAMINATIONS (CODE): ICD-10-CM

## 2017-11-17 DIAGNOSIS — Z23 NEED FOR VACCINATION: ICD-10-CM

## 2017-11-17 DIAGNOSIS — M51.36 LUMBAR DEGENERATIVE DISC DISEASE: ICD-10-CM

## 2017-11-17 DIAGNOSIS — G25.81 RESTLESS LEGS SYNDROME (RLS): ICD-10-CM

## 2017-11-17 PROCEDURE — G0008 ADMIN INFLUENZA VIRUS VAC: HCPCS | Performed by: INTERNAL MEDICINE

## 2017-11-17 PROCEDURE — G8427 DOCREV CUR MEDS BY ELIG CLIN: HCPCS | Performed by: INTERNAL MEDICINE

## 2017-11-17 PROCEDURE — 1123F ACP DISCUSS/DSCN MKR DOCD: CPT | Performed by: INTERNAL MEDICINE

## 2017-11-17 PROCEDURE — G8417 CALC BMI ABV UP PARAM F/U: HCPCS | Performed by: INTERNAL MEDICINE

## 2017-11-17 PROCEDURE — 3014F SCREEN MAMMO DOC REV: CPT | Performed by: INTERNAL MEDICINE

## 2017-11-17 PROCEDURE — 4040F PNEUMOC VAC/ADMIN/RCVD: CPT | Performed by: INTERNAL MEDICINE

## 2017-11-17 PROCEDURE — 99214 OFFICE O/P EST MOD 30 MIN: CPT | Performed by: INTERNAL MEDICINE

## 2017-11-17 PROCEDURE — G8598 ASA/ANTIPLAT THER USED: HCPCS | Performed by: INTERNAL MEDICINE

## 2017-11-17 PROCEDURE — 1090F PRES/ABSN URINE INCON ASSESS: CPT | Performed by: INTERNAL MEDICINE

## 2017-11-17 PROCEDURE — 3017F COLORECTAL CA SCREEN DOC REV: CPT | Performed by: INTERNAL MEDICINE

## 2017-11-17 PROCEDURE — G8484 FLU IMMUNIZE NO ADMIN: HCPCS | Performed by: INTERNAL MEDICINE

## 2017-11-17 PROCEDURE — 1036F TOBACCO NON-USER: CPT | Performed by: INTERNAL MEDICINE

## 2017-11-17 PROCEDURE — G8399 PT W/DXA RESULTS DOCUMENT: HCPCS | Performed by: INTERNAL MEDICINE

## 2017-11-17 PROCEDURE — 90662 IIV NO PRSV INCREASED AG IM: CPT | Performed by: INTERNAL MEDICINE

## 2017-11-17 RX ORDER — HYDRALAZINE HYDROCHLORIDE 25 MG/1
25 TABLET, FILM COATED ORAL 3 TIMES DAILY
Qty: 90 TABLET | Refills: 5 | Status: SHIPPED | OUTPATIENT
Start: 2017-11-17 | End: 2018-01-08 | Stop reason: SDUPTHER

## 2017-11-17 RX ORDER — B-COMPLEX WITH VITAMIN C
1 TABLET ORAL DAILY
Qty: 30 TABLET | Refills: 0 | Status: ON HOLD | OUTPATIENT
Start: 2017-11-17 | End: 2018-01-23 | Stop reason: HOSPADM

## 2017-11-17 ASSESSMENT — ENCOUNTER SYMPTOMS
EYE PAIN: 0
APNEA: 0
PHOTOPHOBIA: 0
EYE ITCHING: 0
RHINORRHEA: 0
RECTAL PAIN: 0
DIARRHEA: 0
NAUSEA: 0
STRIDOR: 0
ABDOMINAL PAIN: 0
BACK PAIN: 1
ABDOMINAL DISTENTION: 0
SHORTNESS OF BREATH: 0
WHEEZING: 0
CONSTIPATION: 0
BLOOD IN STOOL: 0
COUGH: 0
CHEST TIGHTNESS: 0
VOMITING: 0
VOICE CHANGE: 0
TROUBLE SWALLOWING: 0
EYE REDNESS: 0
CHOKING: 0
ANAL BLEEDING: 0
SORE THROAT: 0
COLOR CHANGE: 0
EYE DISCHARGE: 0
FACIAL SWELLING: 0
SINUS PRESSURE: 0

## 2017-11-17 NOTE — PROGRESS NOTES
Chronic Disease Visit Information    BP Readings from Last 3 Encounters:   11/14/17 (!) 155/78   10/30/17 (!) 155/72   10/13/17 (!) 155/70          Hemoglobin A1C (%)   Date Value   07/18/2016 5.7   08/06/2015 5.5     Microalb/Crt. Ratio (mcg/mg creat)   Date Value   08/05/2015 11     LDL Cholesterol (mg/dL)   Date Value   07/01/2017 41     HDL (mg/dL)   Date Value   07/01/2017 63     BUN (mg/dL)   Date Value   11/06/2017 27 (H)     CREATININE (mg/dL)   Date Value   11/06/2017 0.80     Glucose (mg/dL)   Date Value   11/06/2017 101 (H)            Have you changed or started any medications since your last visit including any over-the-counter medicines, vitamins, or herbal medicines? no   Are you having any side effects from any of your medications? -  no  Have you stopped taking any of your medications? Is so, why? -  no    Have you seen any other physician or provider since your last visit? Yes - Records Obtained  Have you had any other diagnostic tests since your last visit? Yes - Records Obtained  Have you been seen in the emergency room and/or had an admission to a hospital since we last saw you? No  Have you had your annual diabetic retinal (eye) exam? No  Have you had your routine dental cleaning in the past 6 months? no    Have you activated your [employer] account? If not, what are your barriers?  Yes     Patient Care Team:  Rodolfo Rahman MD as PCP - Tampamurphy Schwartz MD as PCP - S Attributed Provider         Medical History Review  Past Medical, Family, and Social History reviewed and does contribute to the patient presenting condition  Chief Complaint   Patient presents with    Hypertension     bmp     Edema     follow up     Congestive Heart Failure     management      Health Maintenance   Topic Date Due    Hepatitis C screen  1950    DTaP/Tdap/Td vaccine (1 - Tdap) 01/18/1970    Flu vaccine (1) 09/01/2017    Diabetes screen  07/18/2019    Breast cancer screen  08/04/2019    Lipid screen  07/01/2022    Colon cancer screen colonoscopy  10/07/2026    Zostavax vaccine  Addressed    DEXA (modify frequency per FRAX score)  Completed    Pneumococcal low/med risk  Completed

## 2017-11-17 NOTE — PROGRESS NOTES
Subjective:      Nubia Rogers is a 78 y.o. female who presents today for follow up and management of her chronic medical conditions as noted below. HPI:    Nubia Rogers is c/o of   Chief Complaint   Patient presents with    Hypertension     bmp     Edema     follow up     Congestive Heart Failure     management    .   Doing well   le edema better   wants hep c check   on low salt diet  Past Medical History:   Diagnosis Date    Allergic rhinitis, cause unspecified     Back pain     lumbar    Bowel obstruction     history of due to scar tissue, resolved non-surgically    CAD (coronary artery disease)     Cellulitis     left leg    Cellulitis 2017 August    leg left leg/bug bite    Diverticulosis of colon (without mention of hemorrhage)     GERD (gastroesophageal reflux disease)     History of MI (myocardial infarction) 2005    thought due to a blood clot    History of ovarian cyst 1970    had oopherectomy    History of peritonitis 1968    due to ruptured appendix age 13    HTN (hypertension)     Hx of blood clots     right leg    Hyperlipidemia     Intestinal or peritoneal adhesions with obstruction (postoperative) (postinfection)     Kidney infection     renal failure/sepsis/spider bite    Lateral epicondylitis  of elbow     Muscle strain     right posterior shoulder    Other abnormal glucose     Restless legs syndrome (RLS)     Vitamin D deficiency     Wears glasses        Past Surgical History:   Procedure Laterality Date    ABDOMEN SURGERY  1976    benign tumor removed near remaining overy, 1.5 pounds    APPENDECTOMY  1968    appendix ruptured, developed peritonitis    BUNIONECTOMY Left     along with calcium deposits removed   [address]  2005    negative    COLECTOMY  1969    12 INCHES REMOVED D/T OBSTRUCTION    COLONOSCOPY      CYST REMOVAL Right     right facial    HYSTERECTOMY  1973    taken as a result of recurring cysts    OTHER SURGICAL HISTORY  8/14/14    FESS    OVARY REMOVAL  1970    UNILATERAL due to cyst    OVARY REMOVAL  1971    partial, due to cyst    SINUS SURGERY  2004       Family History   Problem Relation Age of Onset    Stroke Mother     Diabetes Mother     Heart Disease Mother     High Blood Pressure Mother     Heart Disease Father     Heart Disease Brother     High Blood Pressure Brother     Heart Disease Maternal Grandmother     High Blood Pressure Sister        Social History     Social History    Marital status:      Spouse name: N/A    Number of children: N/A    Years of education: N/A     Occupational History    retired      Social History Main Topics    Smoking status: Former Smoker     Packs/day: 0.50     Years: 20.00     Types: Cigarettes     Start date: 8/11/1995    Smokeless tobacco: Never Used      Comment: 6-20-17 quit 10 days ago    Alcohol use 0.0 oz/week      Comment: occasional    Drug use: No    Sexual activity: Not Asked     Other Topics Concern    None     Social History Narrative    None       Current Outpatient Prescriptions   Medication Sig Dispense Refill    hydrALAZINE (APRESOLINE) 25 MG tablet Take 1 tablet by mouth 3 times daily 90 tablet 5    Calcium Carbonate-Vitamin D (OYSTER SHELL CALCIUM/D) 500-200 MG-UNIT TABS Take 1 tablet by mouth daily 30 tablet 0    HYDROcodone-acetaminophen (NORCO) 5-325 MG per tablet Take 1 tablet by mouth every 12 hours as needed for Pain .  Earliest Fill Date: 11/10/17 60 tablet 0    lisinopril (PRINIVIL;ZESTRIL) 20 MG tablet Take 2 tablets by mouth daily 90 tablet 3    pramipexole (MIRAPEX) 1 MG tablet Take 1.5 mg by mouth daily      furosemide (LASIX) 20 MG tablet Take 1 tablet by mouth daily 60 tablet 3    apixaban (ELIQUIS) 5 MG TABS tablet Take 1 tablet by mouth 2 times daily 60 tablet 5    citalopram (CELEXA) 10 MG tablet Take 1 tablet by mouth daily 30 tablet 3    fenofibrate micronized (LOFIBRA) 134 MG capsule Take 1 capsule by mouth every morning (before breakfast) 90 capsule 3    atorvastatin (LIPITOR) 80 MG tablet Take 1 tablet by mouth nightly 90 tablet 3    cyclobenzaprine (FLEXERIL) 10 MG tablet Take 1 tablet by mouth 3 times daily as needed for Muscle spasms 270 tablet 3    famotidine (PEPCID) 20 MG tablet Take 1 tablet by mouth 2 times daily 180 tablet 3    vitamin D (CHOLECALCIFEROL) 1000 UNIT TABS tablet Take 1 tablet by mouth daily 90 tablet 3    nitroGLYCERIN (NITROSTAT) 0.4 MG SL tablet Place 1 tablet under the tongue every 5 minutes as needed for Chest pain 75 tablet 3    aspirin 81 MG chewable tablet Take 81 mg by mouth daily      Calcium Carbonate-Vitamin D (CALCIUM 500/D) 500-125 MG-UNIT TABS Take 1 tablet by mouth 2 times daily        No current facility-administered medications for this visit. Review of Systems:    Review of Systems   Constitutional: Negative for activity change, appetite change, chills, diaphoresis, fatigue and fever. HENT: Negative for congestion, dental problem, drooling, ear discharge, ear pain, facial swelling, hearing loss, mouth sores, nosebleeds, postnasal drip, rhinorrhea, sinus pressure, sneezing, sore throat, tinnitus, trouble swallowing and voice change. Eyes: Negative for photophobia, pain, discharge, redness, itching and visual disturbance. Respiratory: Negative for apnea, cough, choking, chest tightness, shortness of breath, wheezing and stridor. Cardiovascular: Positive for leg swelling. Negative for chest pain and palpitations. Gastrointestinal: Negative for abdominal distention, abdominal pain, anal bleeding, blood in stool, constipation, diarrhea, nausea, rectal pain and vomiting. Endocrine: Negative for cold intolerance, heat intolerance, polydipsia, polyphagia and polyuria.    Genitourinary: Negative for decreased urine volume, difficulty urinating, dyspareunia, dysuria, enuresis, flank pain, frequency, genital sores, hematuria, menstrual problem, pelvic pain, urgency, vaginal bleeding and vaginal discharge. Musculoskeletal: Positive for arthralgias, back pain and myalgias. Negative for gait problem, joint swelling, neck pain and neck stiffness. Skin: Negative for color change, pallor, rash and wound. Neurological: Negative for dizziness, tremors, seizures, syncope, facial asymmetry, speech difficulty, weakness, light-headedness, numbness and headaches. Hematological: Negative for adenopathy. Does not bruise/bleed easily. Psychiatric/Behavioral: Positive for dysphoric mood and sleep disturbance. Negative for agitation, behavioral problems, confusion and decreased concentration. The patient is nervous/anxious. Objective:     Physical Exam:      Physical Exam   Constitutional: She is oriented to person, place, and time. She appears well-developed and well-nourished. No distress. HENT:   Head: Normocephalic and atraumatic. Right Ear: External ear normal.   Left Ear: External ear normal.   Nose: Nose normal.   Mouth/Throat: Oropharynx is clear and moist. No oropharyngeal exudate. Eyes: Conjunctivae and EOM are normal. Pupils are equal, round, and reactive to light. Right eye exhibits no discharge. Left eye exhibits no discharge. No scleral icterus. Neck: Normal range of motion. Neck supple. No JVD present. No tracheal deviation present. No thyromegaly present. Cardiovascular: Normal rate, regular rhythm and intact distal pulses. Exam reveals no gallop and no friction rub. Murmur heard. Pulmonary/Chest: Effort normal and breath sounds normal. No respiratory distress. She has no wheezes. She has no rales. She exhibits no tenderness. Abdominal: Soft. Bowel sounds are normal. She exhibits no distension and no mass. There is no tenderness. There is no rebound and no guarding. Genitourinary: Rectal exam shows guaiac negative stool. No vaginal discharge found. Musculoskeletal: She exhibits edema and tenderness.

## 2017-11-29 ENCOUNTER — HOSPITAL ENCOUNTER (OUTPATIENT)
Dept: PAIN MANAGEMENT | Age: 67
Discharge: HOME | End: 2017-11-29
Payer: MEDICARE

## 2017-11-29 VITALS
HEIGHT: 64 IN | OXYGEN SATURATION: 96 % | WEIGHT: 178 LBS | TEMPERATURE: 98.1 F | SYSTOLIC BLOOD PRESSURE: 158 MMHG | HEART RATE: 58 BPM | RESPIRATION RATE: 16 BRPM | DIASTOLIC BLOOD PRESSURE: 82 MMHG | BODY MASS INDEX: 30.39 KG/M2

## 2017-11-29 DIAGNOSIS — M47.816 FACET ARTHRITIS OF LUMBAR REGION: ICD-10-CM

## 2017-11-29 DIAGNOSIS — Z51.81 ENCOUNTER FOR MEDICATION MONITORING: ICD-10-CM

## 2017-11-29 DIAGNOSIS — M16.11 PRIMARY OSTEOARTHRITIS OF RIGHT HIP: ICD-10-CM

## 2017-11-29 DIAGNOSIS — Z51.81 MEDICATION MONITORING ENCOUNTER: ICD-10-CM

## 2017-11-29 DIAGNOSIS — M47.816 SPONDYLOSIS OF LUMBAR REGION WITHOUT MYELOPATHY OR RADICULOPATHY: Primary | ICD-10-CM

## 2017-11-29 DIAGNOSIS — G57.11 MERALGIA PARAESTHETICA, RIGHT: ICD-10-CM

## 2017-11-29 DIAGNOSIS — M51.36 LUMBAR DEGENERATIVE DISC DISEASE: ICD-10-CM

## 2017-11-29 PROCEDURE — 99214 OFFICE O/P EST MOD 30 MIN: CPT | Performed by: PAIN MEDICINE

## 2017-11-29 PROCEDURE — 99213 OFFICE O/P EST LOW 20 MIN: CPT

## 2017-11-29 PROCEDURE — 99214 OFFICE O/P EST MOD 30 MIN: CPT

## 2017-11-29 RX ORDER — HYDROCODONE BITARTRATE AND ACETAMINOPHEN 5; 325 MG/1; MG/1
1 TABLET ORAL EVERY 12 HOURS PRN
Qty: 60 TABLET | Refills: 0 | Status: SHIPPED | OUTPATIENT
Start: 2017-12-10 | End: 2018-01-09 | Stop reason: SDUPTHER

## 2017-11-29 ASSESSMENT — ENCOUNTER SYMPTOMS
SHORTNESS OF BREATH: 0
PHOTOPHOBIA: 0
RESPIRATORY NEGATIVE: 1
COUGH: 0
HEARTBURN: 0
VOMITING: 0
BACK PAIN: 1
SORE THROAT: 0
BLURRED VISION: 0
EYES NEGATIVE: 1
NAUSEA: 0
CONSTIPATION: 1

## 2017-11-29 ASSESSMENT — PAIN DESCRIPTION - ORIENTATION: ORIENTATION: LOWER;RIGHT;LEFT

## 2017-11-29 ASSESSMENT — PAIN DESCRIPTION - PROGRESSION: CLINICAL_PROGRESSION: GRADUALLY WORSENING

## 2017-11-29 ASSESSMENT — PAIN DESCRIPTION - DIRECTION: RADIATING_TOWARDS: LEFT HIP

## 2017-11-29 ASSESSMENT — PAIN DESCRIPTION - ONSET: ONSET: ON-GOING

## 2017-11-29 ASSESSMENT — PAIN DESCRIPTION - FREQUENCY: FREQUENCY: INTERMITTENT

## 2017-11-29 ASSESSMENT — PAIN DESCRIPTION - LOCATION: LOCATION: BACK;LEG

## 2017-11-29 ASSESSMENT — PAIN SCALES - GENERAL: PAINLEVEL_OUTOF10: 5

## 2017-11-29 ASSESSMENT — PAIN DESCRIPTION - PAIN TYPE: TYPE: CHRONIC PAIN

## 2017-11-29 NOTE — PROGRESS NOTES
She has tried chiropractic manipulation (Lumbar facet joint injections on the right side) for the symptoms. The treatment provided significant relief. Patient relates current medications are helping the pain. Patient reports taking pain medications as prescribed, denies obtaining medications from different sources and denies use of illegal drugs. Patient denies side effects from medications like nausea, vomiting, constipation or drowsiness. Patient reports current activities of daily living ar possible due to medications and would like to continue them. OARRS compliant? yes  Concern for prescription abuse? Somewhat-patient is short on her pill count ×2    Current Pain Assessment  Pain Assessment  Pain Assessment: 0-10  Pain Level: 5  Pain Type: Chronic pain  Pain Location: Back, Leg  Pain Orientation: Lower, Right, Left  Pain Radiating Towards: left hip  Pain Descriptors: Constant, Aching, Dull, Burning  Pain Frequency: Intermittent  Pain Onset: On-going  Clinical Progression: Gradually worsening  Effect of Pain on Daily Activities: Pain increases walking & physical activity  Patient's Stated Pain Goal: 2 (To be able to walk & do physical activiity w/less pain)  Pain Intervention(s): Medication (see eMar), Rest, Repositioned, Cold applied                    ADVERSE MEDICATION EFFECTS:   Constipation: yes  Bowel Regimen: Yes  Diet: common adult  Appetite:  ok  Sedation:  no  Urinary Retention: no    FOCUSED PAIN SCALE:  Highest : 10  Lowest :zero w/sitting  Average: Range-4  When and What  was your last procedure: Lumbar RFA L2, L3, L4, L5   11/14/17    Was your procedure effective:  Yes, 50% before I fell     ACTIVITY/SOCIAL/EMOTIONAL:  Sleep Pattern: 5 hours per night.  difficulty falling asleep, nightime awakenings and difficulty falling back asleep if awakened  Energy Level:  Tired/Fatigued  Currently attending Physical Therapy:  No  Home Exercises: daily stretches, walking  Mobility: walking increases the pain  Currently seeing a Psychiatrist or Psychologist:  No  Emotional Issues: normal   Mood: depressed     ABERRANT BEHAVIORS SINCE LAST VISIT:  Have you ever been treated in another Pain Clinic no  Refills for prescriptions appropriate: yes  Lost rx/pills: no  Taking more medication than prescribed:  Yes, too 2 norco every 12 hour now short 12 days  Are you receiving PAIN medications from  other doctors: no  Last Urine/Serum Drug Screen :4/27/17  Was Serum/UDS as anticipated?  no  Brought pill bottles in :yes   Was Pill count appropriate? :no , short 12 days only 11 pills left, due 12/10, filled 11/10/17  Are currently pregnant? not applicable  Recent ER visits: No             Past Medical History      Diagnosis Date    Allergic rhinitis, cause unspecified     Back pain     lumbar    Bowel obstruction     history of due to scar tissue, resolved non-surgically    CAD (coronary artery disease)     Cellulitis     left leg    Cellulitis 2017 August    leg left leg/bug bite    Diverticulosis of colon (without mention of hemorrhage)     GERD (gastroesophageal reflux disease)     History of MI (myocardial infarction) 2005    thought due to a blood clot    History of ovarian cyst 1970    had oopherectomy    History of peritonitis 1968    due to ruptured appendix age 13    HTN (hypertension)     Hx of blood clots     right leg    Hyperlipidemia     Intestinal or peritoneal adhesions with obstruction (postoperative) (postinfection)     Kidney infection     renal failure/sepsis/spider bite    Lateral epicondylitis  of elbow     Muscle strain     right posterior shoulder    Other abnormal glucose     Restless legs syndrome (RLS)     Vitamin D deficiency     Wears glasses        Surgical History  Past Surgical History:   Procedure Laterality Date    ABDOMEN SURGERY  1976    benign tumor removed near remaining overy, 1.5 pounds    APPENDECTOMY  1968    appendix ruptured, developed peritonitis    BUNIONECTOMY Left     along with calcium deposits removed   [address] CARDIAC CATHETERIZATION  2005    negative    COLECTOMY  1969    12 INCHES REMOVED D/T OBSTRUCTION    COLONOSCOPY      CYST REMOVAL Right     right facial    HYSTERECTOMY  1973    taken as a result of recurring cysts    OTHER SURGICAL HISTORY  8/14/14    FESS    OVARY REMOVAL  1970    UNILATERAL due to cyst    OVARY REMOVAL  1971    partial, due to cyst    SINUS SURGERY  2004       Medications  Current Outpatient Prescriptions   Medication Sig Dispense Refill    [START ON 12/10/2017] HYDROcodone-acetaminophen (NORCO) 5-325 MG per tablet Take 1 tablet by mouth every 12 hours as needed for Pain .  Earliest Fill Date: 12/10/17 60 tablet 0    hydrALAZINE (APRESOLINE) 25 MG tablet Take 1 tablet by mouth 3 times daily 90 tablet 5    Calcium Carbonate-Vitamin D (OYSTER SHELL CALCIUM/D) 500-200 MG-UNIT TABS Take 1 tablet by mouth daily 30 tablet 0    lisinopril (PRINIVIL;ZESTRIL) 20 MG tablet Take 2 tablets by mouth daily 90 tablet 3    pramipexole (MIRAPEX) 1 MG tablet Take 1.5 mg by mouth daily      furosemide (LASIX) 20 MG tablet Take 1 tablet by mouth daily 60 tablet 3    apixaban (ELIQUIS) 5 MG TABS tablet Take 1 tablet by mouth 2 times daily 60 tablet 5    citalopram (CELEXA) 10 MG tablet Take 1 tablet by mouth daily 30 tablet 3    fenofibrate micronized (LOFIBRA) 134 MG capsule Take 1 capsule by mouth every morning (before breakfast) 90 capsule 3    atorvastatin (LIPITOR) 80 MG tablet Take 1 tablet by mouth nightly 90 tablet 3    cyclobenzaprine (FLEXERIL) 10 MG tablet Take 1 tablet by mouth 3 times daily as needed for Muscle spasms 270 tablet 3    famotidine (PEPCID) 20 MG tablet Take 1 tablet by mouth 2 times daily 180 tablet 3    vitamin D (CHOLECALCIFEROL) 1000 UNIT TABS tablet Take 1 tablet by mouth daily 90 tablet 3    nitroGLYCERIN (NITROSTAT) 0.4 MG SL tablet Place 1 tablet under the tongue every 5 tremors, seizures, weakness, numbness and headaches. Endo/Heme/Allergies: Bruises/bleeds easily. Psychiatric/Behavioral: Positive for depression. Negative for substance abuse and suicidal ideas. The patient is not nervous/anxious and does not have insomnia. GENERAL PHYSICAL EXAM:  Vitals: BP (!) 158/82   Pulse 58   Temp 98.1 °F (36.7 °C) (Oral)   Resp 16   Ht 5' 4\" (1.626 m)   Wt 178 lb (80.7 kg)   SpO2 96%   BMI 30.55 kg/m² , Body mass index is 30.55 kg/m². Physical Exam   Constitutional: She is oriented to person, place, and time. She appears well-developed and well-nourished. HENT:   Head: Normocephalic and atraumatic. Eyes: Conjunctivae and EOM are normal. Pupils are equal, round, and reactive to light. Neck: Normal range of motion. Neck supple. No tracheal deviation present. No thyromegaly present. Cardiovascular: Normal rate and regular rhythm. Pulmonary/Chest: Effort normal.   Abdominal: She exhibits no distension. Musculoskeletal: Normal range of motion. Neurological: She is alert and oriented to person, place, and time. She has normal strength. She displays no atrophy, no tremor and normal reflexes. No sensory deficit. She displays a negative Romberg sign. Gait abnormal. Coordination normal.   Reflex Scores:       Patellar reflexes are 2+ on the right side and 1+ on the left side. Achilles reflexes are 2+ on the right side and 0 on the left side. Skin: Skin is warm and dry. Psychiatric: She has a normal mood and affect. Her speech is normal and behavior is normal. Judgment and thought content normal. Cognition and memory are normal.    Right Ankle Exam     Muscle Strength   The patient has normal right ankle strength. Left Ankle Exam     Muscle Strength   The patient has normal left ankle strength. Right Knee Exam     Muscle Strength     The patient has normal right knee strength.       Left Knee Exam     Muscle Strength     The patient has desiccation all levels. Slight heterogeneity of the marrow signal on T1-weighted imaging without evidence for marrow edema to suggest acute fractures.       L1-L2 level: Bilateral facet arthrosis with broad-based is bulge noted resulting in mild deformity and flattening of the ventral thecal sac with mild bilateral neuroforaminal narrowing       L2-L3 level: Mild facet arthrosis with ligamentum flavum thickening and broad-based is bulge without significant central canal stenosis or neuroforaminal narrowing.       L3-L4 level: Bilateral facet hypertrophy with ligamentum flavum thickening and broad-based is bulge with mild bilateral neuroforaminal narrowing without significant central canal stenosis.       L4-L5 level: Bilateral facet arthrosis with ligamentum flavum thickening and right-sided facet joint effusion and a broad-based disc bulge with mild edema in the right pedicle of L4 vertebral body likely relate with facet joint degenerative changes with    mild bilateral neuroforaminal narrowing and mild central canal stenosis.       L5-S1 level: Bilateral facet arthrosis with broad-based is bulge resulting in mild bilateral neuroforaminal narrowing without significant central canal stenosis.       Mild atrophy of the paraspinous muscles.       Impression: Lumbar spondylotic changes with mild central canal stenosis L4-5 level.  Please see above level by level complete analysis and details       Final report electronically signed by Gerhardt Chatters, M.D. on 8/20/2016          Patient Active Problem List   Diagnosis    Other specified disorders of rotator cuff syndrome of shoulder and allied disorders    Diverticulosis of large intestine    Intestinal or peritoneal adhesions with obstruction (postoperative) (postinfection)    Restless legs syndrome (RLS)    GERD (gastroesophageal reflux disease)    Essential hypertension    Mixed hyperlipidemia    Other abnormal glucose    Atherosclerosis    Lateral epicondylitis    Allergic rhinitis    Vitamin D deficiency    Depression    Sinusitis    Degenerative joint disease (DJD) of hip    Edema extremities    Injury of foot, left    Facial droop    CVA (cerebral vascular accident) ([facility] [place] [address]    BIN (acute kidney injury) ([address]    Bradycardia    Ataxia    Aphasia    TIA (transient ischemic attack)    Need for prophylactic vaccination and inoculation against cholera alone    Chest pain    Osteopenia    Lumbago    Facet arthritis of lumbar region ([address]    Meralgia paraesthetica    Lumbar degenerative disc disease    Spondylosis of lumbar region without myelopathy or radiculopathy    Blood poisoning ([facility] [place] [address]    Cellulitis of left lower extremity    Medication monitoring encounter    Deep vein thrombosis (DVT) of right lower extremity ([facility] [place] [address]        ASSESSMENT    Salome Saha is a 78 y.o. female with     1. Spondylosis of lumbar region without myelopathy or radiculopathy    2. Medication monitoring encounter    3. Meralgia paraesthetica, right    4. Encounter for medication monitoring    5. Lumbar degenerative disc disease    6. Primary osteoarthritis of right hip    7. Facet arthritis of lumbar region [facility])       PLAN    We will continue current pain medications  Current medications are being tolerated without any Adverse side effects. Orders Placed This Encounter   Medications    HYDROcodone-acetaminophen (NORCO) 5-325 MG per tablet     Sig: Take 1 tablet by mouth every 12 hours as needed for Pain . Earliest Fill Date: 12/10/17     Dispense:  60 tablet     Refill:  0     Urine drug screens have been appropriate. No aberrant activity noted Except for short pill count 2nd time  Analgesia is achieved. Activities of daily living are possible because of medications. Safe use of medications explained to patient. Counselling/Preventive measures for pain  Control:    [x]  Spine strengthening exercises are discussed with patient in detail.      [x] Ill effects of being on chronic pain medications such as sleep disturbances, hormonal changes, withdrawal symptoms,  chronic opioid dependence and tolerance were discussed with patient. I had asked the patient to minimize medication use and utilize pain medications only for uncontrolled rest pain or pain with exertional activities. I advised patient not to self escalate pain medications without consulting with us. At each of patient's future visits we will try to taper pain medications, while adjusting the adjunct medications, and re-evaluating for Physical Therapy to improve spinal and joint strength. We will continue to have discussions to decrease pain medications as tolerated. We discussed the same at today's visit and have not been to implement it, as the patient's pain is not under control with current medications. Patient reports good pain improvement on the right side following the RFA of the medial branches at the levels of L2-L3 L4-L5. Her pain is mostly in the left side. This patient had undergone lumbar facet joint injections on the left side which gave her good pain relief of more than 60% lasting for few weeks at her left lower lumbar pain is getting worse now. As other conservative measures failed we decided to do confirmation median branch blocks at the levels of T12, L1, L2, L3, L4, L5 on the left side and the if that helps her pain then we'll do RFA of L2-L3 L4-L5 on the left side. Patient agrees with the treatment plan. She'll be scheduled for medial branch blocks on left side in the near future. Patient is warned that the drug management agreement should be followed as signed. Dangers of self medicating and not following the agreement were explained to patient including reparatory arrest or death. Any further violation of the agreement may result in patient being discharged from our care.     Decision Making Process : Patient's health history and referral records thoroughly reviewed before focused physical examination and discussion with patient. Over 50% of today's visit is spent on examining the patient and counseling. Level of complexity of date to be reviewed is Moderate. The chart date reviewed include the following: Imaging Reports. Summary of Care. Time spent reviewing with patient the below reports:   Medication safety, Treatment options. Level of diagnosis and management options of this case is multiple: involving the following management options: Interventions as needed, medication management as appropriate, future visits, activity modification, heat/ice as needed, Urine drug screen as required. [x]The patient's questions were answered to the best of my abilities. This note was created using voice recognition software. There may be inaccuracies of transcription  that are inadvertently overlooked prior to the signature. There is any questions about the transcription please contact me. Return in  4 weeks  with Shane Lewis CNP  for further plan of treatment.          Electronically signed by Malvin Uriarte MD on 11/29/2017 at 10:07 AM

## 2017-12-27 ENCOUNTER — HOSPITAL ENCOUNTER (OUTPATIENT)
Age: 67
Setting detail: SPECIMEN
Discharge: HOME | End: 2017-12-27
Payer: MEDICARE

## 2017-12-27 DIAGNOSIS — N17.9 AKI (ACUTE KIDNEY INJURY) (HCC): ICD-10-CM

## 2017-12-27 DIAGNOSIS — I50.32 CHRONIC DIASTOLIC CONGESTIVE HEART FAILURE (HCC): ICD-10-CM

## 2017-12-27 DIAGNOSIS — M51.36 LUMBAR DEGENERATIVE DISC DISEASE: ICD-10-CM

## 2017-12-27 DIAGNOSIS — Z01.89 ENCOUNTER FOR OTHER SPECIFIED SPECIAL EXAMINATIONS (CODE): ICD-10-CM

## 2017-12-27 DIAGNOSIS — M94.9 DISORDER OF CARTILAGE: ICD-10-CM

## 2017-12-27 DIAGNOSIS — G25.81 RESTLESS LEGS SYNDROME (RLS): ICD-10-CM

## 2017-12-27 DIAGNOSIS — I10 ESSENTIAL HYPERTENSION: ICD-10-CM

## 2017-12-27 LAB
-: ABNORMAL
ALBUMIN SERPL-MCNC: 4.5 G/DL (ref 3.5–5.2)
ALBUMIN/GLOBULIN RATIO: 1.9 (ref 1–2.5)
ALP BLD-CCNC: 30 U/L (ref 35–104)
ALT SERPL-CCNC: 21 U/L (ref 5–33)
AMORPHOUS: ABNORMAL
ANION GAP SERPL CALCULATED.3IONS-SCNC: 16 MMOL/L (ref 9–17)
AST SERPL-CCNC: 21 U/L
BACTERIA: ABNORMAL
BILIRUB SERPL-MCNC: 0.54 MG/DL (ref 0.3–1.2)
BILIRUBIN URINE: NEGATIVE
BUN BLDV-MCNC: 28 MG/DL (ref 8–23)
BUN/CREAT BLD: ABNORMAL (ref 9–20)
CALCIUM SERPL-MCNC: 8.9 MG/DL (ref 8.6–10.4)
CASTS UA: ABNORMAL /LPF (ref 0–2)
CHLORIDE BLD-SCNC: 106 MMOL/L (ref 98–107)
CHOLESTEROL/HDL RATIO: 1.6
CHOLESTEROL: 119 MG/DL
CO2: 25 MMOL/L (ref 20–31)
COLOR: YELLOW
COMMENT UA: ABNORMAL
CREAT SERPL-MCNC: 0.83 MG/DL (ref 0.5–0.9)
CRYSTALS, UA: ABNORMAL /HPF
EPITHELIAL CELLS UA: ABNORMAL /HPF (ref 0–5)
GFR AFRICAN AMERICAN: >60 ML/MIN
GFR NON-AFRICAN AMERICAN: >60 ML/MIN
GFR SERPL CREATININE-BSD FRML MDRD: ABNORMAL ML/MIN/{1.73_M2}
GFR SERPL CREATININE-BSD FRML MDRD: ABNORMAL ML/MIN/{1.73_M2}
GLUCOSE BLD-MCNC: 114 MG/DL (ref 70–99)
GLUCOSE URINE: NEGATIVE
HDLC SERPL-MCNC: 73 MG/DL
KETONES, URINE: NEGATIVE
LDL CHOLESTEROL: 34 MG/DL (ref 0–130)
LEUKOCYTE ESTERASE, URINE: NEGATIVE
MUCUS: ABNORMAL
NITRITE, URINE: NEGATIVE
OTHER OBSERVATIONS UA: ABNORMAL
PH UA: 5.5 (ref 5–8)
POTASSIUM SERPL-SCNC: 4.1 MMOL/L (ref 3.7–5.3)
PROTEIN UA: NEGATIVE
RBC UA: ABNORMAL /HPF (ref 0–2)
RENAL EPITHELIAL, UA: ABNORMAL /HPF
SODIUM BLD-SCNC: 147 MMOL/L (ref 135–144)
SPECIFIC GRAVITY UA: 1.02 (ref 1–1.03)
TOTAL PROTEIN: 6.9 G/DL (ref 6.4–8.3)
TRICHOMONAS: ABNORMAL
TRIGL SERPL-MCNC: 62 MG/DL
TSH SERPL DL<=0.05 MIU/L-ACNC: 1.12 MIU/L (ref 0.3–5)
TURBIDITY: ABNORMAL
URINE HGB: NEGATIVE
UROBILINOGEN, URINE: NORMAL
VITAMIN D 25-HYDROXY: 28.2 NG/ML (ref 30–100)
VLDLC SERPL CALC-MCNC: NORMAL MG/DL (ref 1–30)
WBC UA: ABNORMAL /HPF (ref 0–5)
YEAST: ABNORMAL

## 2017-12-29 LAB
DIRECT EXAM: NORMAL
Lab: NORMAL
SPECIMEN DESCRIPTION: NORMAL
STATUS: NORMAL

## 2018-01-08 ENCOUNTER — OFFICE VISIT (OUTPATIENT)
Dept: INTERNAL MEDICINE CLINIC | Age: 68
End: 2018-01-08
Payer: MEDICARE

## 2018-01-08 VITALS
WEIGHT: 187 LBS | HEIGHT: 64 IN | BODY MASS INDEX: 31.92 KG/M2 | DIASTOLIC BLOOD PRESSURE: 62 MMHG | SYSTOLIC BLOOD PRESSURE: 130 MMHG

## 2018-01-08 DIAGNOSIS — I82.401 DEEP VEIN THROMBOSIS (DVT) OF RIGHT LOWER EXTREMITY, UNSPECIFIED CHRONICITY, UNSPECIFIED VEIN (HCC): Primary | ICD-10-CM

## 2018-01-08 DIAGNOSIS — E55.9 VITAMIN D DEFICIENCY: ICD-10-CM

## 2018-01-08 DIAGNOSIS — M77.9 INFLAMMATION AROUND JOINT: ICD-10-CM

## 2018-01-08 DIAGNOSIS — I50.33 ACUTE ON CHRONIC DIASTOLIC CONGESTIVE HEART FAILURE (HCC): ICD-10-CM

## 2018-01-08 DIAGNOSIS — M47.816 FACET ARTHRITIS OF LUMBAR REGION: ICD-10-CM

## 2018-01-08 DIAGNOSIS — R07.89 OTHER CHEST PAIN: ICD-10-CM

## 2018-01-08 DIAGNOSIS — F33.42 RECURRENT MAJOR DEPRESSIVE EPISODES, IN FULL REMISSION (HCC): ICD-10-CM

## 2018-01-08 DIAGNOSIS — I10 ESSENTIAL HYPERTENSION: ICD-10-CM

## 2018-01-08 PROCEDURE — 4040F PNEUMOC VAC/ADMIN/RCVD: CPT | Performed by: INTERNAL MEDICINE

## 2018-01-08 PROCEDURE — G8427 DOCREV CUR MEDS BY ELIG CLIN: HCPCS | Performed by: INTERNAL MEDICINE

## 2018-01-08 PROCEDURE — G8484 FLU IMMUNIZE NO ADMIN: HCPCS | Performed by: INTERNAL MEDICINE

## 2018-01-08 PROCEDURE — 99214 OFFICE O/P EST MOD 30 MIN: CPT | Performed by: INTERNAL MEDICINE

## 2018-01-08 PROCEDURE — G8598 ASA/ANTIPLAT THER USED: HCPCS | Performed by: INTERNAL MEDICINE

## 2018-01-08 PROCEDURE — 1036F TOBACCO NON-USER: CPT | Performed by: INTERNAL MEDICINE

## 2018-01-08 PROCEDURE — 1090F PRES/ABSN URINE INCON ASSESS: CPT | Performed by: INTERNAL MEDICINE

## 2018-01-08 PROCEDURE — G8417 CALC BMI ABV UP PARAM F/U: HCPCS | Performed by: INTERNAL MEDICINE

## 2018-01-08 PROCEDURE — 3017F COLORECTAL CA SCREEN DOC REV: CPT | Performed by: INTERNAL MEDICINE

## 2018-01-08 PROCEDURE — G8399 PT W/DXA RESULTS DOCUMENT: HCPCS | Performed by: INTERNAL MEDICINE

## 2018-01-08 PROCEDURE — 3014F SCREEN MAMMO DOC REV: CPT | Performed by: INTERNAL MEDICINE

## 2018-01-08 PROCEDURE — 1123F ACP DISCUSS/DSCN MKR DOCD: CPT | Performed by: INTERNAL MEDICINE

## 2018-01-08 RX ORDER — ATORVASTATIN CALCIUM 80 MG/1
80 TABLET, FILM COATED ORAL NIGHTLY
Qty: 90 TABLET | Refills: 3 | Status: SHIPPED | OUTPATIENT
Start: 2018-01-08 | End: 2018-04-05 | Stop reason: SDUPTHER

## 2018-01-08 RX ORDER — HYDRALAZINE HYDROCHLORIDE 25 MG/1
25 TABLET, FILM COATED ORAL 3 TIMES DAILY
Qty: 90 TABLET | Refills: 5 | Status: CANCELLED | OUTPATIENT
Start: 2018-01-08

## 2018-01-08 RX ORDER — HYDRALAZINE HYDROCHLORIDE 25 MG/1
25 TABLET, FILM COATED ORAL 3 TIMES DAILY
Qty: 90 TABLET | Refills: 5 | Status: ON HOLD | OUTPATIENT
Start: 2018-01-08 | End: 2018-01-23 | Stop reason: HOSPADM

## 2018-01-08 RX ORDER — LISINOPRIL 20 MG/1
40 TABLET ORAL DAILY
Qty: 90 TABLET | Refills: 3 | Status: CANCELLED | OUTPATIENT
Start: 2018-01-08

## 2018-01-08 RX ORDER — PRAMIPEXOLE DIHYDROCHLORIDE 1 MG/1
1.5 TABLET ORAL DAILY
Qty: 90 TABLET | Refills: 3 | Status: SHIPPED | OUTPATIENT
Start: 2018-01-08 | End: 2018-04-05 | Stop reason: SDUPTHER

## 2018-01-08 RX ORDER — CITALOPRAM 10 MG/1
10 TABLET ORAL DAILY
Qty: 90 TABLET | Refills: 3 | Status: SHIPPED | OUTPATIENT
Start: 2018-01-08 | End: 2018-07-05 | Stop reason: SDUPTHER

## 2018-01-08 RX ORDER — DOXYCYCLINE HYCLATE 100 MG/1
100 CAPSULE ORAL 2 TIMES DAILY
Qty: 20 CAPSULE | Refills: 0 | Status: SHIPPED | OUTPATIENT
Start: 2018-01-08 | End: 2018-01-18

## 2018-01-08 RX ORDER — FUROSEMIDE 20 MG/1
40 TABLET ORAL DAILY
Qty: 180 TABLET | Refills: 3 | Status: SHIPPED | OUTPATIENT
Start: 2018-01-08 | End: 2018-01-23 | Stop reason: SDUPTHER

## 2018-01-08 RX ORDER — LISINOPRIL 40 MG/1
40 TABLET ORAL DAILY
Qty: 90 TABLET | Refills: 3 | Status: SHIPPED | OUTPATIENT
Start: 2018-01-08 | End: 2018-08-21 | Stop reason: SDUPTHER

## 2018-01-08 RX ORDER — FENOFIBRATE 134 MG/1
134 CAPSULE ORAL
Qty: 90 CAPSULE | Refills: 3 | Status: SHIPPED | OUTPATIENT
Start: 2018-01-08 | End: 2018-04-05 | Stop reason: SDUPTHER

## 2018-01-08 RX ORDER — FAMOTIDINE 20 MG/1
20 TABLET, FILM COATED ORAL 2 TIMES DAILY
Qty: 180 TABLET | Refills: 3 | Status: SHIPPED | OUTPATIENT
Start: 2018-01-08 | End: 2018-04-05 | Stop reason: SDUPTHER

## 2018-01-08 RX ORDER — CYCLOBENZAPRINE HCL 10 MG
10 TABLET ORAL 3 TIMES DAILY PRN
Qty: 270 TABLET | Refills: 3 | Status: SHIPPED | OUTPATIENT
Start: 2018-01-08 | End: 2019-08-14 | Stop reason: SINTOL

## 2018-01-08 RX ORDER — NITROGLYCERIN 0.4 MG/1
0.4 TABLET SUBLINGUAL EVERY 5 MIN PRN
Qty: 75 TABLET | Refills: 3 | Status: SHIPPED | OUTPATIENT
Start: 2018-01-08 | End: 2021-09-01 | Stop reason: SDUPTHER

## 2018-01-08 ASSESSMENT — ENCOUNTER SYMPTOMS
EYE ITCHING: 0
SINUS PRESSURE: 0
APNEA: 0
SHORTNESS OF BREATH: 1
VOICE CHANGE: 0
VOMITING: 0
CONSTIPATION: 0
STRIDOR: 0
EYE PAIN: 0
TROUBLE SWALLOWING: 0
RHINORRHEA: 0
PHOTOPHOBIA: 0
CHOKING: 0
EYE DISCHARGE: 0
ABDOMINAL PAIN: 0
NAUSEA: 0
FACIAL SWELLING: 0
EYE REDNESS: 0
SORE THROAT: 0
WHEEZING: 0
ANAL BLEEDING: 0
COUGH: 0
COLOR CHANGE: 0
BACK PAIN: 0
ABDOMINAL DISTENTION: 0
RECTAL PAIN: 0
BLOOD IN STOOL: 0
CHEST TIGHTNESS: 0
DIARRHEA: 0

## 2018-01-08 NOTE — PROGRESS NOTES
Chronic Disease Visit Information    BP Readings from Last 3 Encounters:   11/29/17 (!) 158/82   11/17/17 138/74   11/14/17 (!) 155/78          Hemoglobin A1C (%)   Date Value   07/18/2016 5.7   08/06/2015 5.5     Microalb/Crt. Ratio (mcg/mg creat)   Date Value   08/05/2015 11     LDL Cholesterol (mg/dL)   Date Value   12/27/2017 34     HDL (mg/dL)   Date Value   12/27/2017 73     BUN (mg/dL)   Date Value   12/27/2017 28 (H)     CREATININE (mg/dL)   Date Value   12/27/2017 0.83     Glucose (mg/dL)   Date Value   12/27/2017 114 (H)            Have you changed or started any medications since your last visit including any over-the-counter medicines, vitamins, or herbal medicines? no   Are you having any side effects from any of your medications? -  no  Have you stopped taking any of your medications? Is so, why? -  no    Have you seen any other physician or provider since your last visit? Yes - Records Obtained  Have you had any other diagnostic tests since your last visit? Yes - Records Obtained  Have you been seen in the emergency room and/or had an admission to a hospital since we last saw you? No  Have you had your annual diabetic retinal (eye) exam? No  Have you had your routine dental cleaning in the past 6 months? no    Have you activated your [employer] account? If not, what are your barriers?  Yes     Patient Care Team:  Ratna Otero MD as PCP - Kate De La O MD as PCP - S Attributed Provider         Medical History Review  Past Medical, Family, and Social History reviewed and does contribute to the patient presenting condition  Chief Complaint   Patient presents with    Hypertension     discuss labs - added hydralazine     Congestive Heart Failure     management     Other     risk management     Lesion(s)     removed in skin clinic c/o it being scaly      Health Maintenance   Topic Date Due    DTaP/Tdap/Td vaccine (1 - Tdap) 01/18/1970    A1C test (Diabetic or Prediabetic)  07/18/2017   

## 2018-01-08 NOTE — PROGRESS NOTES
respiratory distress. She has no wheezes. She has no rales. She exhibits no tenderness. Abdominal: Soft. Bowel sounds are normal. She exhibits no distension and no mass. There is no tenderness. There is no rebound and no guarding. Genitourinary: Rectal exam shows guaiac negative stool. No vaginal discharge found. Musculoskeletal: She exhibits edema and tenderness. Lymphadenopathy:     She has no cervical adenopathy. Neurological: She is alert and oriented to person, place, and time. She has normal reflexes. No cranial nerve deficit. She exhibits normal muscle tone. Coordination normal.   Skin: Skin is warm and dry. No rash noted. She is not diaphoretic. No erythema. No pallor. Psychiatric: Her behavior is normal. Judgment and thought content normal. She exhibits a depressed mood.          Results for orders placed or performed during the hospital encounter of 12/27/17   Lipid Panel   Result Value Ref Range    Cholesterol 119 <200 mg/dL    HDL 73 >40 mg/dL    LDL Cholesterol 34 0 - 130 mg/dL    Chol/HDL Ratio 1.6 <5    Triglycerides 62 <150 mg/dL    VLDL NOT REPORTED 1 - 30 mg/dL   TSH without Reflex   Result Value Ref Range    TSH 1.12 0.30 - 5.00 mIU/L   Urinalysis   Result Value Ref Range    Color, UA YELLOW YEL    Turbidity UA SLIGHTLY CLOUDY (A) CLEAR    Glucose, Ur NEGATIVE NEG    Bilirubin Urine NEGATIVE NEG    Ketones, Urine NEGATIVE NEG    Specific Gravity, UA 1.025 1.005 - 1.030    Urine Hgb NEGATIVE NEG    pH, UA 5.5 5.0 - 8.0    Protein, UA NEGATIVE NEG    Urobilinogen, Urine Normal NORM    Nitrite, Urine NEGATIVE NEG    Leukocyte Esterase, Urine NEGATIVE NEG    Urinalysis Comments NOT REPORTED    Vitamin D 25 Hydroxy   Result Value Ref Range    Vit D, 25-Hydroxy 28.2 (L) 30.0 - 100.0 ng/mL   Comprehensive Metabolic Panel   Result Value Ref Range    Glucose 114 (H) 70 - 99 mg/dL    BUN 28 (H) 8 - 23 mg/dL    CREATININE 0.83 0.50 - 0.90 mg/dL    Bun/Cre Ratio NOT REPORTED 9 - 20    Calcium 8.9 8.6 - 10.4 mg/dL    Sodium 147 (H) 135 - 144 mmol/L    Potassium 4.1 3.7 - 5.3 mmol/L    Chloride 106 98 - 107 mmol/L    CO2 25 20 - 31 mmol/L    Anion Gap 16 9 - 17 mmol/L    Alkaline Phosphatase 30 (L) 35 - 104 U/L    ALT 21 5 - 33 U/L    AST 21 <32 U/L    Total Bilirubin 0.54 0.3 - 1.2 mg/dL    Total Protein 6.9 6.4 - 8.3 g/dL    Alb 4.5 3.5 - 5.2 g/dL    Albumin/Globulin Ratio 1.9 1.0 - 2.5    GFR Non-African American >60 >60 mL/min    GFR African American >60 >60 mL/min    GFR Comment          GFR Staging NOT REPORTED    Hepatitis C RNA, quantitative, PCR   Result Value Ref Range    Specimen Description . PLASMA     Special Requests NOT REPORTED     Direct Exam       HCV RNA NOT DETECTED   This test is a sensitive method for quantitating HCV RNA    Direct Exam        viral loads in plasma. It utilizes RT-PCR in the FDA approved Roche    Direct Exam        Ampliprep/Taqman 48 System. This test is intended for detecting and    Direct Exam        quantifying HCV RNA viral loads in the range of 15 IU/mL to 100,000,000 IU/mL    Direct Exam        (1.18 log IU/mL to 8.00 log IU/mL). Patients should have confirmed HCV infection    Direct Exam        prior to RNA quantification. This test has been developed to monitor disease    Direct Exam        progression and efficacy of anti-HCV drug therapy. This test has been    Direct Exam  optimized for HCV genotypes 1-6.      Direct Exam       [facility] [id number] [place] [address], [address] [place] [address] [phone]    Status FINAL 12/29/2017    Microscopic Urinalysis   Result Value Ref Range    -          WBC, UA 0 TO 2 0 - 5 /HPF    RBC, UA None 0 - 2 /HPF    Casts UA NOT REPORTED 0 - 2 /LPF    Crystals UA NOT REPORTED NONE /HPF    Epithelial Cells UA 0 TO 2 0 - 5 /HPF    Renal Epithelial, Urine NOT REPORTED 0 /HPF    Bacteria, UA FEW (A) NONE    Mucus, UA 1+ (A) NONE    Trichomonas, UA NOT REPORTED NONE    Amorphous, UA 1+ (A) NONE    Other Observations UA NOT REPORTED NREQ

## 2018-01-09 ENCOUNTER — HOSPITAL ENCOUNTER (OUTPATIENT)
Dept: PAIN MANAGEMENT | Age: 68
Discharge: HOME | End: 2018-01-09
Payer: MEDICARE

## 2018-01-09 VITALS
TEMPERATURE: 98 F | DIASTOLIC BLOOD PRESSURE: 71 MMHG | RESPIRATION RATE: 16 BRPM | BODY MASS INDEX: 31.92 KG/M2 | SYSTOLIC BLOOD PRESSURE: 170 MMHG | HEIGHT: 64 IN | WEIGHT: 187 LBS | OXYGEN SATURATION: 98 % | HEART RATE: 65 BPM

## 2018-01-09 DIAGNOSIS — M51.36 LUMBAR DEGENERATIVE DISC DISEASE: ICD-10-CM

## 2018-01-09 DIAGNOSIS — M47.816 LUMBAR FACET ARTHROPATHY: ICD-10-CM

## 2018-01-09 DIAGNOSIS — M51.36 DDD (DEGENERATIVE DISC DISEASE), LUMBAR: Primary | ICD-10-CM

## 2018-01-09 DIAGNOSIS — G57.11 MERALGIA PARAESTHETICA, RIGHT: ICD-10-CM

## 2018-01-09 DIAGNOSIS — M47.816 FACET ARTHRITIS OF LUMBAR REGION: ICD-10-CM

## 2018-01-09 DIAGNOSIS — Z51.81 ENCOUNTER FOR MEDICATION MONITORING: ICD-10-CM

## 2018-01-09 DIAGNOSIS — M47.816 SPONDYLOSIS OF LUMBAR REGION WITHOUT MYELOPATHY OR RADICULOPATHY: ICD-10-CM

## 2018-01-09 PROCEDURE — 99213 OFFICE O/P EST LOW 20 MIN: CPT | Performed by: NURSE PRACTITIONER

## 2018-01-09 PROCEDURE — 99213 OFFICE O/P EST LOW 20 MIN: CPT

## 2018-01-09 RX ORDER — HYDROCODONE BITARTRATE AND ACETAMINOPHEN 5; 325 MG/1; MG/1
1 TABLET ORAL EVERY 12 HOURS PRN
Qty: 60 TABLET | Refills: 0 | Status: SHIPPED | OUTPATIENT
Start: 2018-01-09 | End: 2018-01-31 | Stop reason: SDUPTHER

## 2018-01-09 ASSESSMENT — ENCOUNTER SYMPTOMS
GASTROINTESTINAL NEGATIVE: 1
BACK PAIN: 1
RESPIRATORY NEGATIVE: 1

## 2018-01-09 NOTE — PROGRESS NOTES
12 INCHES REMOVED D/T OBSTRUCTION    COLONOSCOPY      CYST REMOVAL Right     right facial    HYSTERECTOMY  1973    taken as a result of recurring cysts    OTHER SURGICAL HISTORY  8/14/14    FESS    OVARY REMOVAL  1970    UNILATERAL due to cyst    OVARY REMOVAL  1971    partial, due to cyst    SINUS SURGERY  2004       Allergies   Allergen Reactions    Bactrim [Sulfamethoxazole-Trimethoprim] Other (See Comments)     sepsis    Codeine Itching         Current Outpatient Prescriptions:     HYDROcodone-acetaminophen (NORCO) 5-325 MG per tablet, Take 1 tablet by mouth every 12 hours as needed for Pain for up to 30 days. , Disp: 60 tablet, Rfl: 0    Cyanocobalamin (VITAMIN B 12 PO), Take by mouth, Disp: , Rfl:     vitamin D (CHOLECALCIFEROL) 1000 UNIT TABS tablet, Take 1 tablet by mouth daily, Disp: 90 tablet, Rfl: 3    fenofibrate micronized (LOFIBRA) 134 MG capsule, Take 1 capsule by mouth every morning (before breakfast), Disp: 90 capsule, Rfl: 3    atorvastatin (LIPITOR) 80 MG tablet, Take 1 tablet by mouth nightly, Disp: 90 tablet, Rfl: 3    famotidine (PEPCID) 20 MG tablet, Take 1 tablet by mouth 2 times daily, Disp: 180 tablet, Rfl: 3    citalopram (CELEXA) 10 MG tablet, Take 1 tablet by mouth daily, Disp: 90 tablet, Rfl: 3    furosemide (LASIX) 20 MG tablet, Take 2 tablets by mouth daily, Disp: 180 tablet, Rfl: 3    pramipexole (MIRAPEX) 1 MG tablet, Take 1.5 tablets by mouth daily, Disp: 90 tablet, Rfl: 3    lisinopril (PRINIVIL;ZESTRIL) 40 MG tablet, Take 1 tablet by mouth daily, Disp: 90 tablet, Rfl: 3    hydrALAZINE (APRESOLINE) 25 MG tablet, Take 1 tablet by mouth 3 times daily, Disp: 90 tablet, Rfl: 5    apixaban (ELIQUIS) 5 MG TABS tablet, Take 1 tablet by mouth 2 times daily, Disp: 180 tablet, Rfl: 3    doxycycline hyclate (VIBRAMYCIN) 100 MG capsule, Take 1 capsule by mouth 2 times daily for 10 days Take with food, but avoid dairy, calcium and MTV's 2 hours before and after the Psychiatric/Behavioral: Positive for depression. Managing         Physical Exam:  Pulse 65   Temp 98 °F (36.7 °C) (Oral)   Resp 16   Ht 5' 4\" (1.626 m)   Wt 187 lb (84.8 kg)   SpO2 98%   BMI 32.10 kg/m²   B/P 170/71    Physical Exam   Constitutional: She is oriented to person, place, and time and well-developed, well-nourished, and in no distress. HENT:   Head: Normocephalic. Neck: Normal range of motion. Neck supple. Cardiovascular:   Swelling left lower extremity   Pulmonary/Chest: Effort normal.   Musculoskeletal:        Right shoulder: She exhibits decreased range of motion and tenderness. Arms:  Tender left lumbar paraspinal muscles, left lumbar facet joints   Neurological: She is oriented to person, place, and time. She has normal strength. Reflex Scores:       Patellar reflexes are 2+ on the right side and 2+ on the left side. Achilles reflexes are 2+ on the right side and 2+ on the left side. Skin:        erythema left lower extremity, on antibiotic       Record/Diagnostics Review:    As above, I did review the imaging  5/2/2017 10:37 AM - Luciano, pn Incoming Lab Results From [facility]     Component Results     Component Value Ref Range & Units Status Collected Lab   Pain Management Drug Panel Interp, Urine Inconsistent   [facility]27[facility]2017  8[facility]   ([facility]   ________________________________________________________________   DRUGS EXPECTED:   NO DRUGS EXPECTED   ________________________________________________________________   INCONSISTENT with medications provided:   Hydrocodone   Norhydrocodone   ________________________________________________________________   INTERPRETIVE INFORMATION:Pain Mgt Terry, High Res/EMIT, Ur, Interp   Interpretation depends on accuracy and completeness of patient   medication information submitted by client.     6-Acetylmorphine, Ur Not Detected   Final 04/27/2017  8:50 AM [facility]   7-Aminoclonazepam, Urine Not Detected   Final 04/27/2017 tablet      Other Visit Diagnoses    None. Treatment Plan:  DISCUSSION: Treatment options discussed with patient and all questions answered to patient's satisfaction. Advised of elevated B/P   [x] Ill effects of being on chronic pain medications such as sleep disturbances, respiratory depression, hormonal changes, withdrawal symptoms,  chronic opioid dependence and tolerance as well as risk of taking opioids with Benzodiazepines and taking opioids along with  alcohol,  were discussed with patient. I had asked the patient to minimize medication use and utilize pain medications only for uncontrolled rest pain or pain with exertional activities. I advised patient not to self escalate pain medications without consulting with us. At each of patient's future visits we will try to taper pain medications, while adjusting the adjunct medications, and re-evaluating for Physical Therapy to improve spinal and joint strength. We will continue to have discussions to decrease pain medications as tolerated.      TREATMENT OPTIONS:   Return in 4 weeks  Continue opioid therapy. Script written for hydrocodone  Contract requirements met. Satisfactory pain management plan. Medication helps with personal goals and self care needs. Patient is stable on current regimen of meds and medication is effectively managing pain, we will continue current medications without changes. As always, we encourage daily stretching and strengthening exercises, and recommend minimizing use of pain medications unless patient cannot get through daily activities due to pain.   Follow up appointment made for 4 weeks*

## 2018-01-15 ENCOUNTER — HOSPITAL ENCOUNTER (OUTPATIENT)
Dept: PAIN MANAGEMENT | Age: 68
Discharge: HOME | End: 2018-01-15
Payer: MEDICARE

## 2018-01-15 ENCOUNTER — HOSPITAL ENCOUNTER (OUTPATIENT)
Dept: GENERAL RADIOLOGY | Age: 68
Discharge: HOME | End: 2018-01-15
Payer: MEDICARE

## 2018-01-15 VITALS
DIASTOLIC BLOOD PRESSURE: 78 MMHG | TEMPERATURE: 98.4 F | HEIGHT: 64 IN | RESPIRATION RATE: 16 BRPM | HEART RATE: 63 BPM | BODY MASS INDEX: 31.92 KG/M2 | SYSTOLIC BLOOD PRESSURE: 155 MMHG | OXYGEN SATURATION: 96 % | WEIGHT: 187 LBS

## 2018-01-15 DIAGNOSIS — M47.816 LUMBAR FACET ARTHROPATHY: ICD-10-CM

## 2018-01-15 DIAGNOSIS — M47.816 SPONDYLOSIS OF LUMBAR REGION WITHOUT MYELOPATHY OR RADICULOPATHY: ICD-10-CM

## 2018-01-15 DIAGNOSIS — R52 PAIN: ICD-10-CM

## 2018-01-15 DIAGNOSIS — M47.816 FACET ARTHRITIS OF LUMBAR REGION: Primary | ICD-10-CM

## 2018-01-15 DIAGNOSIS — M51.36 DDD (DEGENERATIVE DISC DISEASE), LUMBAR: ICD-10-CM

## 2018-01-15 PROCEDURE — 6360000004 HC RX CONTRAST MEDICATION

## 2018-01-15 PROCEDURE — 64495 INJ PARAVERT F JNT L/S 3 LEV: CPT

## 2018-01-15 PROCEDURE — 64495 INJ PARAVERT F JNT L/S 3 LEV: CPT | Performed by: PAIN MEDICINE

## 2018-01-15 PROCEDURE — 64493 INJ PARAVERT F JNT L/S 1 LEV: CPT

## 2018-01-15 PROCEDURE — 64494 INJ PARAVERT F JNT L/S 2 LEV: CPT

## 2018-01-15 PROCEDURE — 6360000002 HC RX W HCPCS: Performed by: PAIN MEDICINE

## 2018-01-15 PROCEDURE — 2500000003 HC RX 250 WO HCPCS

## 2018-01-15 PROCEDURE — 3209999900 FLUORO FOR SURGICAL PROCEDURES

## 2018-01-15 PROCEDURE — 64494 INJ PARAVERT F JNT L/S 2 LEV: CPT | Performed by: PAIN MEDICINE

## 2018-01-15 PROCEDURE — 64493 INJ PARAVERT F JNT L/S 1 LEV: CPT | Performed by: PAIN MEDICINE

## 2018-01-15 RX ORDER — FENTANYL CITRATE 50 UG/ML
INJECTION, SOLUTION INTRAMUSCULAR; INTRAVENOUS
Status: COMPLETED | OUTPATIENT
Start: 2018-01-15 | End: 2018-01-15

## 2018-01-15 RX ORDER — MIDAZOLAM HYDROCHLORIDE 1 MG/ML
INJECTION INTRAMUSCULAR; INTRAVENOUS
Status: COMPLETED | OUTPATIENT
Start: 2018-01-15 | End: 2018-01-15

## 2018-01-15 RX ADMIN — FENTANYL CITRATE 50 MCG: 50 INJECTION INTRAMUSCULAR; INTRAVENOUS at 10:16

## 2018-01-15 RX ADMIN — MIDAZOLAM 2 MG: 1 INJECTION INTRAMUSCULAR; INTRAVENOUS at 10:09

## 2018-01-15 ASSESSMENT — PAIN - FUNCTIONAL ASSESSMENT
PAIN_FUNCTIONAL_ASSESSMENT: 0-10
PAIN_FUNCTIONAL_ASSESSMENT: 0-10

## 2018-01-15 ASSESSMENT — PAIN DESCRIPTION - PAIN TYPE: TYPE: CHRONIC PAIN

## 2018-01-15 ASSESSMENT — PAIN DESCRIPTION - DESCRIPTORS: DESCRIPTORS: ACHING;CONSTANT;DULL

## 2018-01-15 ASSESSMENT — PAIN DESCRIPTION - PROGRESSION: CLINICAL_PROGRESSION: GRADUALLY WORSENING

## 2018-01-15 ASSESSMENT — PAIN DESCRIPTION - ONSET: ONSET: ON-GOING

## 2018-01-15 ASSESSMENT — PAIN DESCRIPTION - ORIENTATION: ORIENTATION: LEFT

## 2018-01-15 ASSESSMENT — PAIN DESCRIPTION - LOCATION: LOCATION: BACK

## 2018-01-15 ASSESSMENT — PAIN SCALES - GENERAL: PAINLEVEL_OUTOF10: 5

## 2018-01-15 ASSESSMENT — PAIN DESCRIPTION - FREQUENCY: FREQUENCY: CONTINUOUS

## 2018-01-15 ASSESSMENT — ACTIVITIES OF DAILY LIVING (ADL): EFFECT OF PAIN ON DAILY ACTIVITIES: PAIN INCREASES WITH WALKING

## 2018-01-15 NOTE — PROGRESS NOTES
Discharge criteria met. Patient alert and oriented x3  Post procedure dressing dry and intact. Sensory and motor function intact as per pre-procedure. Instructions and follow up reviewed with pt at patient at discharge.   Patient discharged per wheelchair with steady gait, denies weakness or numbness in leg, awaiting ride- discharged with volunteer and  to drive her home and assist at home 1101

## 2018-01-15 NOTE — PROCEDURES
Pre-Procedure Note    Patient Name: Mariella Cabrera   YOB: 1950  Room/Bed: Room/bed info not found  Medical Record Number: [id number]  Date: 1/15/2018       Indication:    1. Facet arthritis of lumbar region ([address]    2. DDD (degenerative disc disease), lumbar    3. Spondylosis of lumbar region without myelopathy or radiculopathy    4. Lumbar facet arthropathy        Consent: On file. Vital Signs:   Vitals:    01/15/18 1024   BP: (!) 177/84   Pulse: 66   Resp: 12   Temp:    SpO2: 96%       Past Medical History:   has a past medical history of Allergic rhinitis, cause unspecified; Back pain; Bowel obstruction; CAD (coronary artery disease); Cellulitis; Cellulitis; Diverticulosis of colon (without mention of hemorrhage); GERD (gastroesophageal reflux disease); History of MI (myocardial infarction); History of ovarian cyst; History of peritonitis; HTN (hypertension); Hx of blood clots; Hyperlipidemia; Intestinal or peritoneal adhesions with obstruction (postoperative) (postinfection); Kidney infection; Lateral epicondylitis  of elbow; Muscle strain; Other abnormal glucose; Restless legs syndrome (RLS); Vitamin D deficiency; and Wears glasses. Past Surgical History:   has a past surgical history that includes Cardiac catheterization; Ovary removal (1970); colectomy (1969); Appendectomy (1968); Ovary removal (1971); Hysterectomy (1973); Abdomen surgery (1976); Cardiac catheterization (2005); Bunionectomy (Left); sinus surgery (2004); Colonoscopy; other surgical history (8/14/14); and cyst removal (Right). Pre-Sedation Documentation and Exam:   Vital signs have been reviewed (see flow sheet for vitals). Mallampati Airway Assessment:  normal    ASA Classification:  Class 3 - A patient with severe systemic disease that limits activity but is not incapacitating    Sedation/ Anesthesia Plan:   intravenous sedation  as needed.     Medications Planned:   midazolam (Versed) / Fentanyl  Intravenously  as

## 2018-01-16 ENCOUNTER — TELEPHONE (OUTPATIENT)
Dept: PAIN MANAGEMENT | Age: 68
End: 2018-01-16

## 2018-01-21 ENCOUNTER — APPOINTMENT (OUTPATIENT)
Dept: CT IMAGING | Age: 68
DRG: 293 | End: 2018-01-21
Payer: MEDICARE

## 2018-01-21 ENCOUNTER — APPOINTMENT (OUTPATIENT)
Dept: GENERAL RADIOLOGY | Age: 68
DRG: 293 | End: 2018-01-21
Payer: MEDICARE

## 2018-01-21 ENCOUNTER — HOSPITAL ENCOUNTER (INPATIENT)
Age: 68
LOS: 1 days | Discharge: HOME | DRG: 293 | End: 2018-01-23
Attending: EMERGENCY MEDICINE | Admitting: INTERNAL MEDICINE
Payer: MEDICARE

## 2018-01-21 DIAGNOSIS — R06.02 SHORTNESS OF BREATH: ICD-10-CM

## 2018-01-21 DIAGNOSIS — M79.89 LEG SWELLING: Primary | ICD-10-CM

## 2018-01-21 LAB
-: ABNORMAL
ABSOLUTE EOS #: 0.6 K/UL (ref 0–0.4)
ABSOLUTE IMMATURE GRANULOCYTE: ABNORMAL K/UL (ref 0–0.3)
ABSOLUTE LYMPH #: 2.1 K/UL (ref 1–4.8)
ABSOLUTE MONO #: 0.6 K/UL (ref 0.1–1.3)
ALBUMIN SERPL-MCNC: 3.8 G/DL (ref 3.5–5.2)
ALBUMIN/GLOBULIN RATIO: ABNORMAL (ref 1–2.5)
ALP BLD-CCNC: 32 U/L (ref 35–104)
ALT SERPL-CCNC: 19 U/L (ref 5–33)
AMORPHOUS: ABNORMAL
ANION GAP SERPL CALCULATED.3IONS-SCNC: 13 MMOL/L (ref 9–17)
AST SERPL-CCNC: 22 U/L
BACTERIA: ABNORMAL
BASOPHILS # BLD: 1 % (ref 0–2)
BASOPHILS ABSOLUTE: 0.1 K/UL (ref 0–0.2)
BILIRUB SERPL-MCNC: 0.21 MG/DL (ref 0.3–1.2)
BILIRUBIN URINE: NEGATIVE
BNP INTERPRETATION: ABNORMAL
BUN BLDV-MCNC: 25 MG/DL (ref 8–23)
BUN/CREAT BLD: ABNORMAL (ref 9–20)
CALCIUM SERPL-MCNC: 8.6 MG/DL (ref 8.6–10.4)
CASTS UA: ABNORMAL /LPF
CHLORIDE BLD-SCNC: 108 MMOL/L (ref 98–107)
CO2: 24 MMOL/L (ref 20–31)
COLOR: YELLOW
COMMENT UA: ABNORMAL
CREAT SERPL-MCNC: 0.65 MG/DL (ref 0.5–0.9)
CRYSTALS, UA: ABNORMAL /HPF
DIFFERENTIAL TYPE: ABNORMAL
EKG ATRIAL RATE: 70 BPM
EKG P AXIS: 76 DEGREES
EKG P-R INTERVAL: 174 MS
EKG Q-T INTERVAL: 398 MS
EKG QRS DURATION: 70 MS
EKG QTC CALCULATION (BAZETT): 429 MS
EKG R AXIS: 58 DEGREES
EKG T AXIS: 56 DEGREES
EKG VENTRICULAR RATE: 70 BPM
EOSINOPHILS RELATIVE PERCENT: 8 % (ref 0–4)
EPITHELIAL CELLS UA: ABNORMAL /HPF
GFR AFRICAN AMERICAN: >60 ML/MIN
GFR NON-AFRICAN AMERICAN: >60 ML/MIN
GFR SERPL CREATININE-BSD FRML MDRD: ABNORMAL ML/MIN/{1.73_M2}
GFR SERPL CREATININE-BSD FRML MDRD: ABNORMAL ML/MIN/{1.73_M2}
GLUCOSE BLD-MCNC: 111 MG/DL (ref 70–99)
GLUCOSE URINE: NEGATIVE
HCT VFR BLD CALC: 33.7 % (ref 36–46)
HEMOGLOBIN: 11.4 G/DL (ref 12–16)
IMMATURE GRANULOCYTES: ABNORMAL %
INR BLD: 1.1
KETONES, URINE: NEGATIVE
LEUKOCYTE ESTERASE, URINE: ABNORMAL
LYMPHOCYTES # BLD: 30 % (ref 24–44)
MCH RBC QN AUTO: 32.3 PG (ref 26–34)
MCHC RBC AUTO-ENTMCNC: 33.9 G/DL (ref 31–37)
MCV RBC AUTO: 95.4 FL (ref 80–100)
MONOCYTES # BLD: 9 % (ref 1–7)
MUCUS: ABNORMAL
NITRITE, URINE: NEGATIVE
NRBC AUTOMATED: ABNORMAL PER 100 WBC
OTHER OBSERVATIONS UA: ABNORMAL
PARTIAL THROMBOPLASTIN TIME: 23.8 SEC (ref 23–31)
PDW BLD-RTO: 13.8 % (ref 11.5–14.9)
PH UA: 6 (ref 5–8)
PLATELET # BLD: 256 K/UL (ref 150–450)
PLATELET ESTIMATE: ABNORMAL
PMV BLD AUTO: 8 FL (ref 6–12)
POTASSIUM SERPL-SCNC: 3.9 MMOL/L (ref 3.7–5.3)
PRO-BNP: 386 PG/ML
PROTEIN UA: NEGATIVE
PROTHROMBIN TIME: 11.1 SEC (ref 9.7–12)
RBC # BLD: 3.54 M/UL (ref 4–5.2)
RBC # BLD: ABNORMAL 10*6/UL
RBC UA: ABNORMAL /HPF
RENAL EPITHELIAL, UA: ABNORMAL /HPF
SEG NEUTROPHILS: 52 % (ref 36–66)
SEGMENTED NEUTROPHILS ABSOLUTE COUNT: 3.8 K/UL (ref 1.3–9.1)
SODIUM BLD-SCNC: 145 MMOL/L (ref 135–144)
SPECIFIC GRAVITY UA: 1.02 (ref 1–1.03)
TOTAL PROTEIN: 6.1 G/DL (ref 6.4–8.3)
TRICHOMONAS: ABNORMAL
TROPONIN INTERP: NORMAL
TROPONIN INTERP: NORMAL
TROPONIN T: <0.03 NG/ML
TROPONIN T: <0.03 NG/ML
TURBIDITY: CLEAR
URINE HGB: NEGATIVE
UROBILINOGEN, URINE: NORMAL
WBC # BLD: 7.1 K/UL (ref 3.5–11)
WBC # BLD: ABNORMAL 10*3/UL
WBC UA: ABNORMAL /HPF
YEAST: ABNORMAL

## 2018-01-21 PROCEDURE — 87086 URINE CULTURE/COLONY COUNT: CPT

## 2018-01-21 PROCEDURE — 36415 COLL VENOUS BLD VENIPUNCTURE: CPT

## 2018-01-21 PROCEDURE — 85730 THROMBOPLASTIN TIME PARTIAL: CPT

## 2018-01-21 PROCEDURE — 93005 ELECTROCARDIOGRAM TRACING: CPT

## 2018-01-21 PROCEDURE — 96374 THER/PROPH/DIAG INJ IV PUSH: CPT

## 2018-01-21 PROCEDURE — 85025 COMPLETE CBC W/AUTO DIFF WBC: CPT

## 2018-01-21 PROCEDURE — 2580000003 HC RX 258: Performed by: EMERGENCY MEDICINE

## 2018-01-21 PROCEDURE — 80053 COMPREHEN METABOLIC PANEL: CPT

## 2018-01-21 PROCEDURE — 99285 EMERGENCY DEPT VISIT HI MDM: CPT

## 2018-01-21 PROCEDURE — 87088 URINE BACTERIA CULTURE: CPT

## 2018-01-21 PROCEDURE — 85610 PROTHROMBIN TIME: CPT

## 2018-01-21 PROCEDURE — 81001 URINALYSIS AUTO W/SCOPE: CPT

## 2018-01-21 PROCEDURE — 87186 SC STD MICRODIL/AGAR DIL: CPT

## 2018-01-21 PROCEDURE — 6360000002 HC RX W HCPCS: Performed by: EMERGENCY MEDICINE

## 2018-01-21 PROCEDURE — 83880 ASSAY OF NATRIURETIC PEPTIDE: CPT

## 2018-01-21 PROCEDURE — 6360000004 HC RX CONTRAST MEDICATION: Performed by: EMERGENCY MEDICINE

## 2018-01-21 PROCEDURE — 71045 X-RAY EXAM CHEST 1 VIEW: CPT

## 2018-01-21 PROCEDURE — 71260 CT THORAX DX C+: CPT

## 2018-01-21 PROCEDURE — 84484 ASSAY OF TROPONIN QUANT: CPT

## 2018-01-21 RX ORDER — 0.9 % SODIUM CHLORIDE 0.9 %
100 INTRAVENOUS SOLUTION INTRAVENOUS ONCE
Status: COMPLETED | OUTPATIENT
Start: 2018-01-21 | End: 2018-01-21

## 2018-01-21 RX ORDER — SODIUM CHLORIDE 0.9 % (FLUSH) 0.9 %
10 SYRINGE (ML) INJECTION PRN
Status: DISCONTINUED | OUTPATIENT
Start: 2018-01-21 | End: 2018-01-23 | Stop reason: HOSPADM

## 2018-01-21 RX ORDER — HYDROCODONE BITARTRATE AND ACETAMINOPHEN 5; 325 MG/1; MG/1
1 TABLET ORAL ONCE
Status: COMPLETED | OUTPATIENT
Start: 2018-01-21 | End: 2018-01-22

## 2018-01-21 RX ORDER — FUROSEMIDE 10 MG/ML
40 INJECTION INTRAMUSCULAR; INTRAVENOUS ONCE
Status: COMPLETED | OUTPATIENT
Start: 2018-01-21 | End: 2018-01-21

## 2018-01-21 RX ADMIN — FUROSEMIDE 40 MG: 10 INJECTION, SOLUTION INTRAVENOUS at 23:02

## 2018-01-21 RX ADMIN — Medication 10 ML: at 22:30

## 2018-01-21 RX ADMIN — IOPAMIDOL 100 ML: 755 INJECTION, SOLUTION INTRAVENOUS at 22:29

## 2018-01-21 RX ADMIN — SODIUM CHLORIDE 100 ML: 9 INJECTION, SOLUTION INTRAVENOUS at 22:30

## 2018-01-21 ASSESSMENT — ENCOUNTER SYMPTOMS
BACK PAIN: 0
SPUTUM PRODUCTION: 0
GASTROINTESTINAL NEGATIVE: 1
EYES NEGATIVE: 1
WHEEZING: 0
COUGH: 0
ABDOMINAL PAIN: 0
SHORTNESS OF BREATH: 1

## 2018-01-21 ASSESSMENT — PAIN DESCRIPTION - PAIN TYPE: TYPE: ACUTE PAIN

## 2018-01-21 ASSESSMENT — PAIN DESCRIPTION - LOCATION: LOCATION: FOOT;TOE (COMMENT WHICH ONE)

## 2018-01-21 ASSESSMENT — PAIN DESCRIPTION - ORIENTATION: ORIENTATION: LEFT;RIGHT

## 2018-01-22 LAB
LV EF: 65 %
LVEF MODALITY: NORMAL

## 2018-01-22 PROCEDURE — 6360000002 HC RX W HCPCS: Performed by: INTERNAL MEDICINE

## 2018-01-22 PROCEDURE — 93306 TTE W/DOPPLER COMPLETE: CPT

## 2018-01-22 PROCEDURE — 1200000000 HC SEMI PRIVATE

## 2018-01-22 PROCEDURE — 6370000000 HC RX 637 (ALT 250 FOR IP): Performed by: EMERGENCY MEDICINE

## 2018-01-22 PROCEDURE — 6370000000 HC RX 637 (ALT 250 FOR IP): Performed by: INTERNAL MEDICINE

## 2018-01-22 PROCEDURE — 93970 EXTREMITY STUDY: CPT

## 2018-01-22 PROCEDURE — 2580000003 HC RX 258: Performed by: INTERNAL MEDICINE

## 2018-01-22 PROCEDURE — 99223 1ST HOSP IP/OBS HIGH 75: CPT | Performed by: INTERNAL MEDICINE

## 2018-01-22 RX ORDER — PRAMIPEXOLE DIHYDROCHLORIDE 1.5 MG/1
1.5 TABLET ORAL DAILY
Status: DISCONTINUED | OUTPATIENT
Start: 2018-01-22 | End: 2018-01-23 | Stop reason: HOSPADM

## 2018-01-22 RX ORDER — FAMOTIDINE 20 MG/1
20 TABLET, FILM COATED ORAL 2 TIMES DAILY
Status: DISCONTINUED | OUTPATIENT
Start: 2018-01-22 | End: 2018-01-23 | Stop reason: HOSPADM

## 2018-01-22 RX ORDER — POTASSIUM CHLORIDE 7.45 MG/ML
10 INJECTION INTRAVENOUS PRN
Status: DISCONTINUED | OUTPATIENT
Start: 2018-01-22 | End: 2018-01-23 | Stop reason: HOSPADM

## 2018-01-22 RX ORDER — ACETAMINOPHEN 325 MG/1
650 TABLET ORAL EVERY 4 HOURS PRN
Status: DISCONTINUED | OUTPATIENT
Start: 2018-01-22 | End: 2018-01-23 | Stop reason: HOSPADM

## 2018-01-22 RX ORDER — POTASSIUM CHLORIDE 20 MEQ/1
40 TABLET, EXTENDED RELEASE ORAL PRN
Status: DISCONTINUED | OUTPATIENT
Start: 2018-01-22 | End: 2018-01-23 | Stop reason: HOSPADM

## 2018-01-22 RX ORDER — SODIUM CHLORIDE 0.9 % (FLUSH) 0.9 %
10 SYRINGE (ML) INJECTION EVERY 12 HOURS SCHEDULED
Status: DISCONTINUED | OUTPATIENT
Start: 2018-01-22 | End: 2018-01-23 | Stop reason: HOSPADM

## 2018-01-22 RX ORDER — SODIUM CHLORIDE 0.9 % (FLUSH) 0.9 %
10 SYRINGE (ML) INJECTION PRN
Status: DISCONTINUED | OUTPATIENT
Start: 2018-01-22 | End: 2018-01-23 | Stop reason: HOSPADM

## 2018-01-22 RX ORDER — CALCIUM CARBONATE/VITAMIN D3 250-3.125
2 TABLET ORAL 2 TIMES DAILY
Status: DISCONTINUED | OUTPATIENT
Start: 2018-01-22 | End: 2018-01-23 | Stop reason: HOSPADM

## 2018-01-22 RX ORDER — FUROSEMIDE 10 MG/ML
40 INJECTION INTRAMUSCULAR; INTRAVENOUS DAILY
Status: DISCONTINUED | OUTPATIENT
Start: 2018-01-22 | End: 2018-01-23 | Stop reason: HOSPADM

## 2018-01-22 RX ORDER — POTASSIUM CHLORIDE 20MEQ/15ML
40 LIQUID (ML) ORAL PRN
Status: DISCONTINUED | OUTPATIENT
Start: 2018-01-22 | End: 2018-01-23 | Stop reason: HOSPADM

## 2018-01-22 RX ORDER — HYDRALAZINE HYDROCHLORIDE 25 MG/1
25 TABLET, FILM COATED ORAL EVERY 8 HOURS SCHEDULED
Status: DISCONTINUED | OUTPATIENT
Start: 2018-01-22 | End: 2018-01-22

## 2018-01-22 RX ORDER — HYDRALAZINE HYDROCHLORIDE 50 MG/1
50 TABLET, FILM COATED ORAL EVERY 8 HOURS SCHEDULED
Status: DISCONTINUED | OUTPATIENT
Start: 2018-01-22 | End: 2018-01-23 | Stop reason: HOSPADM

## 2018-01-22 RX ORDER — CYCLOBENZAPRINE HCL 10 MG
10 TABLET ORAL 3 TIMES DAILY PRN
Status: DISCONTINUED | OUTPATIENT
Start: 2018-01-22 | End: 2018-01-23 | Stop reason: HOSPADM

## 2018-01-22 RX ORDER — CITALOPRAM 20 MG/1
10 TABLET ORAL DAILY
Status: DISCONTINUED | OUTPATIENT
Start: 2018-01-22 | End: 2018-01-23 | Stop reason: HOSPADM

## 2018-01-22 RX ORDER — ASPIRIN 81 MG/1
81 TABLET, CHEWABLE ORAL DAILY
Status: DISCONTINUED | OUTPATIENT
Start: 2018-01-22 | End: 2018-01-23 | Stop reason: HOSPADM

## 2018-01-22 RX ORDER — LISINOPRIL 20 MG/1
40 TABLET ORAL DAILY
Status: DISCONTINUED | OUTPATIENT
Start: 2018-01-22 | End: 2018-01-23 | Stop reason: HOSPADM

## 2018-01-22 RX ORDER — HYDROCODONE BITARTRATE AND ACETAMINOPHEN 5; 325 MG/1; MG/1
1 TABLET ORAL EVERY 12 HOURS PRN
Status: DISCONTINUED | OUTPATIENT
Start: 2018-01-22 | End: 2018-01-23 | Stop reason: HOSPADM

## 2018-01-22 RX ORDER — NITROGLYCERIN 0.4 MG/1
0.4 TABLET SUBLINGUAL EVERY 5 MIN PRN
Status: DISCONTINUED | OUTPATIENT
Start: 2018-01-22 | End: 2018-01-23 | Stop reason: HOSPADM

## 2018-01-22 RX ORDER — ATORVASTATIN CALCIUM 80 MG/1
80 TABLET, FILM COATED ORAL NIGHTLY
Status: DISCONTINUED | OUTPATIENT
Start: 2018-01-22 | End: 2018-01-23 | Stop reason: HOSPADM

## 2018-01-22 RX ADMIN — FAMOTIDINE 20 MG: 20 TABLET ORAL at 14:30

## 2018-01-22 RX ADMIN — CITALOPRAM HYDROBROMIDE 10 MG: 20 TABLET ORAL at 14:32

## 2018-01-22 RX ADMIN — ACETAMINOPHEN 650 MG: 325 TABLET, FILM COATED ORAL at 06:45

## 2018-01-22 RX ADMIN — PRAMIPEXOLE DIHYDROCHLORIDE 1.5 MG: 1.5 TABLET ORAL at 14:30

## 2018-01-22 RX ADMIN — CALCIUM CARBONATE-CHOLECALCIFEROL TAB 250 MG-125 UNIT 500 MG: 250-125 TAB at 20:54

## 2018-01-22 RX ADMIN — Medication 10 ML: at 09:17

## 2018-01-22 RX ADMIN — LISINOPRIL 40 MG: 20 TABLET ORAL at 14:31

## 2018-01-22 RX ADMIN — HYDROCODONE BITARTRATE AND ACETAMINOPHEN 1 TABLET: 5; 325 TABLET ORAL at 12:12

## 2018-01-22 RX ADMIN — HYDRALAZINE HYDROCHLORIDE 50 MG: 50 TABLET, FILM COATED ORAL at 22:53

## 2018-01-22 RX ADMIN — HYDROCODONE BITARTRATE AND ACETAMINOPHEN 1 TABLET: 5; 325 TABLET ORAL at 00:46

## 2018-01-22 RX ADMIN — HYDRALAZINE HYDROCHLORIDE 25 MG: 25 TABLET, FILM COATED ORAL at 09:30

## 2018-01-22 RX ADMIN — ASPIRIN 81 MG 81 MG: 81 TABLET ORAL at 14:30

## 2018-01-22 RX ADMIN — FAMOTIDINE 20 MG: 20 TABLET ORAL at 20:53

## 2018-01-22 RX ADMIN — APIXABAN 5 MG: 5 TABLET, FILM COATED ORAL at 14:29

## 2018-01-22 RX ADMIN — Medication 10 ML: at 20:54

## 2018-01-22 RX ADMIN — FUROSEMIDE 40 MG: 10 INJECTION, SOLUTION INTRAVENOUS at 09:16

## 2018-01-22 RX ADMIN — APIXABAN 5 MG: 5 TABLET, FILM COATED ORAL at 20:53

## 2018-01-22 RX ADMIN — VITAMIN D, TAB 1000IU (100/BT) 1000 UNITS: 25 TAB at 14:30

## 2018-01-22 RX ADMIN — ACETAMINOPHEN 650 MG: 325 TABLET, FILM COATED ORAL at 20:54

## 2018-01-22 RX ADMIN — ATORVASTATIN CALCIUM 80 MG: 80 TABLET, FILM COATED ORAL at 20:53

## 2018-01-22 ASSESSMENT — PAIN SCALES - GENERAL
PAINLEVEL_OUTOF10: 6
PAINLEVEL_OUTOF10: 0
PAINLEVEL_OUTOF10: 4
PAINLEVEL_OUTOF10: 4
PAINLEVEL_OUTOF10: 7
PAINLEVEL_OUTOF10: 6

## 2018-01-22 ASSESSMENT — PAIN DESCRIPTION - DESCRIPTORS: DESCRIPTORS: HEADACHE;THROBBING

## 2018-01-22 ASSESSMENT — PAIN DESCRIPTION - LOCATION
LOCATION: HEAD

## 2018-01-22 ASSESSMENT — PAIN DESCRIPTION - PAIN TYPE
TYPE: ACUTE PAIN

## 2018-01-22 NOTE — H&P
[facility]    HISTORY AND PHYSICAL EXAMINATION            Date:   1/22/2018  Patient name:  Franne Rinne  Date of admission:  1/21/2018  8:26 PM  MRN:   [id number]  YOB: 1950    CHIEF COMPLAINT     History Obtained From:  Patient and chart review. Leg Swelling (bilateral)       HISTORY OF PRESENT ILLNESS      The patient is a 80 y.o.  female who is admitted to the hospital for   Chief Complaint   Patient presents with    Leg Swelling     bilateral    b/l 5 days bp elevated  Sob pos pnd   pos orthopnea   no cp    H/o dvt    PAST MEDICAL HISTORY       has a past medical history of Allergic rhinitis, cause unspecified; Back pain; Bowel obstruction; CAD (coronary artery disease); Cellulitis; Cellulitis; Diverticulosis of colon (without mention of hemorrhage); GERD (gastroesophageal reflux disease); History of MI (myocardial infarction); History of ovarian cyst; History of peritonitis; HTN (hypertension); Hx of blood clots; Hyperlipidemia; Intestinal or peritoneal adhesions with obstruction (postoperative) (postinfection); Kidney infection; Lateral epicondylitis  of elbow; Muscle strain; Other abnormal glucose; Restless legs syndrome (RLS); Vitamin D deficiency; and Wears glasses. PAST SURGICAL HISTORY       has a past surgical history that includes Cardiac catheterization; Ovary removal (1970); colectomy (1969); Appendectomy (1968); Ovary removal (1971); Hysterectomy (1973); Abdomen surgery (1976); Cardiac catheterization (2005); Bunionectomy (Left); sinus surgery (2004); Colonoscopy; other surgical history (8/14/14); and cyst removal (Right). HOME MEDICATIONS        Prior to Admission medications    Medication Sig Start Date End Date Taking? Authorizing Provider   HYDROcodone-acetaminophen (NORCO) 5-325 MG per tablet Take 1 tablet by mouth every 12 hours as needed for Pain for up to 30 days.  1/9/18 2/8/18 Yes Pratik Richardson MAIK Luz   Cyanocobalamin (VITAMIN B 12 PO) Take by mouth   Yes Historical Provider, MD   nitroGLYCERIN (NITROSTAT) 0.4 MG SL tablet Place 1 tablet under the tongue every 5 minutes as needed for Chest pain 1/8/18  Yes Janna Armstrong MD   vitamin D (CHOLECALCIFEROL) 1000 UNIT TABS tablet Take 1 tablet by mouth daily 1/8/18  Yes Janna Armstrong MD   fenofibrate micronized (LOFIBRA) 134 MG capsule Take 1 capsule by mouth every morning (before breakfast) 1/8/18  Yes Janna Armstrong MD   atorvastatin (LIPITOR) 80 MG tablet Take 1 tablet by mouth nightly 1/8/18  Yes Janna Armstrong MD   cyclobenzaprine (FLEXERIL) 10 MG tablet Take 1 tablet by mouth 3 times daily as needed for Muscle spasms 1/8/18  Yes Janna Armstrong MD   famotidine (PEPCID) 20 MG tablet Take 1 tablet by mouth 2 times daily 1/8/18  Yes Janna Armstrong MD   citalopram (CELEXA) 10 MG tablet Take 1 tablet by mouth daily 1/8/18  Yes Janna Armstrong MD   furosemide (LASIX) 20 MG tablet Take 2 tablets by mouth daily 1/8/18  Yes Janna Armstrong MD   pramipexole (MIRAPEX) 1 MG tablet Take 1.5 tablets by mouth daily 1/8/18  Yes Janna Armstrong MD   lisinopril (PRINIVIL;ZESTRIL) 40 MG tablet Take 1 tablet by mouth daily 1/8/18  Yes Janna Armstrong MD   hydrALAZINE (APRESOLINE) 25 MG tablet Take 1 tablet by mouth 3 times daily 1/8/18  Yes Ezequiel Schwartz MD   apixaban (ELIQUIS) 5 MG TABS tablet Take 1 tablet by mouth 2 times daily 1/8/18  Yes Ezequiel Schwartz MD   Calcium Carbonate-Vitamin D (OYSTER SHELL CALCIUM/D) 500-200 MG-UNIT TABS Take 1 tablet by mouth daily 11/17/17 11/17/18 Yes Janna Armstrong MD   aspirin 81 MG chewable tablet Take 81 mg by mouth daily   Yes Historical Provider, MD   Calcium Carbonate-Vitamin D (CALCIUM 500/D) 500-125 MG-UNIT TABS Take 1 tablet by mouth 2 times daily    Yes Historical Provider, MD       ALLERGIES      Bactrim [sulfamethoxazole-trimethoprim] and Codeine    SOCIAL HISTORY       reports that she has quit smoking.  Her BMI 33.45 kg/m²      · General appearance: well nourished  · HEENT: Head: Normocephalic, no lesions, without obvious abnormality. · Lungs: clear to auscultation bilaterally  · Heart: regular rate and rhythm, S1, S2 normal, no murmur, click, rub or gallop  · Abdomen: soft, non-tender; bowel sounds normal; no masses,  no organomegaly  · Extremities: extremities normal, atraumatic, no cyanosis   ·  2x le edema   · Neurological: Gait normal. Reflexes normal and symmetric. Sensation grossly normal  · Skin - no rash, no lump   · Eye no icterus no redness  · Psych-normal affect   · NEURO-no limb weakness  No facial droop  · Lymphatic system-no lymphadenopathy no splenomegaly     DIAGNOSTICS      Laboratory Testing:  CBC:   Recent Labs      01/21/18 2110   WBC  7.1   HGB  11.4*   PLT  256     BMP:    Recent Labs      01/21/18 2110   NA  145*   K  3.9   CL  108*   CO2  24   BUN  25*   CREATININE  0.65   GLUCOSE  111*     S. Calcium:  Recent Labs      01/21/18 2110   CALCIUM  8.6     S. Ionized Calcium:No results for input(s): IONCA in the last 72 hours. S. Magnesium:No results for input(s): MG in the last 72 hours. S. Phosphorus:No results for input(s): PHOS in the last 72 hours. S. Glucose:No results for input(s): POCGLU in the last 72 hours. Glycosylated hemoglobin A1C: No results for input(s): LABA1C in the last 72 hours. INR:   Recent Labs      01/21/18 2110   INR  1.1     Hepatic functions:   Recent Labs      01/21/18 2110   ALKPHOS  32*   ALT  19   AST  22   PROT  6.1*   BILITOT  0.21*   LABALBU  3.8     Pancreatic functions:No results for input(s): LACTA, AMYLASE in the last 72 hours. S. Lactic Acid: No results for input(s): LACTA in the last 72 hours. Cardiac enzymes:No results for input(s): CKTOTAL, CKMB, CKMBINDEX, TROPONINI in the last 72 hours. BNP:No results for input(s): BNP in the last 72 hours.   Lipid profile: No results for input(s): CHOL, TRIG, HDL, LDLCALC in the last [id number]

## 2018-01-23 VITALS
OXYGEN SATURATION: 97 % | DIASTOLIC BLOOD PRESSURE: 67 MMHG | TEMPERATURE: 98.2 F | WEIGHT: 192.9 LBS | HEIGHT: 64 IN | HEART RATE: 64 BPM | SYSTOLIC BLOOD PRESSURE: 173 MMHG | RESPIRATION RATE: 16 BRPM | BODY MASS INDEX: 32.93 KG/M2

## 2018-01-23 LAB
ANION GAP SERPL CALCULATED.3IONS-SCNC: 11 MMOL/L (ref 9–17)
BUN BLDV-MCNC: 26 MG/DL (ref 8–23)
BUN/CREAT BLD: ABNORMAL (ref 9–20)
CALCIUM SERPL-MCNC: 9 MG/DL (ref 8.6–10.4)
CHLORIDE BLD-SCNC: 103 MMOL/L (ref 98–107)
CO2: 29 MMOL/L (ref 20–31)
CREAT SERPL-MCNC: 0.74 MG/DL (ref 0.5–0.9)
CULTURE: ABNORMAL
CULTURE: ABNORMAL
GFR AFRICAN AMERICAN: >60 ML/MIN
GFR NON-AFRICAN AMERICAN: >60 ML/MIN
GFR SERPL CREATININE-BSD FRML MDRD: ABNORMAL ML/MIN/{1.73_M2}
GFR SERPL CREATININE-BSD FRML MDRD: ABNORMAL ML/MIN/{1.73_M2}
GLUCOSE BLD-MCNC: 113 MG/DL (ref 70–99)
HCT VFR BLD CALC: 35.5 % (ref 36–46)
HEMOGLOBIN: 12 G/DL (ref 12–16)
Lab: ABNORMAL
MCH RBC QN AUTO: 31.9 PG (ref 26–34)
MCHC RBC AUTO-ENTMCNC: 33.7 G/DL (ref 31–37)
MCV RBC AUTO: 94.6 FL (ref 80–100)
NRBC AUTOMATED: ABNORMAL PER 100 WBC
ORGANISM: ABNORMAL
PDW BLD-RTO: 13.8 % (ref 11.5–14.9)
PLATELET # BLD: 268 K/UL (ref 150–450)
PMV BLD AUTO: 7.3 FL (ref 6–12)
POTASSIUM SERPL-SCNC: 3.7 MMOL/L (ref 3.7–5.3)
RBC # BLD: 3.75 M/UL (ref 4–5.2)
SODIUM BLD-SCNC: 143 MMOL/L (ref 135–144)
SPECIMEN DESCRIPTION: ABNORMAL
SPECIMEN DESCRIPTION: ABNORMAL
STATUS: ABNORMAL
WBC # BLD: 6.1 K/UL (ref 3.5–11)

## 2018-01-23 PROCEDURE — 99239 HOSP IP/OBS DSCHRG MGMT >30: CPT | Performed by: INTERNAL MEDICINE

## 2018-01-23 PROCEDURE — 2580000003 HC RX 258: Performed by: INTERNAL MEDICINE

## 2018-01-23 PROCEDURE — 80048 BASIC METABOLIC PNL TOTAL CA: CPT

## 2018-01-23 PROCEDURE — 6370000000 HC RX 637 (ALT 250 FOR IP): Performed by: INTERNAL MEDICINE

## 2018-01-23 PROCEDURE — 85027 COMPLETE CBC AUTOMATED: CPT

## 2018-01-23 PROCEDURE — 6360000002 HC RX W HCPCS: Performed by: INTERNAL MEDICINE

## 2018-01-23 PROCEDURE — 36415 COLL VENOUS BLD VENIPUNCTURE: CPT

## 2018-01-23 RX ORDER — FUROSEMIDE 20 MG/1
40 TABLET ORAL DAILY
Qty: 28 TABLET | Refills: 0 | Status: SHIPPED | OUTPATIENT
Start: 2018-01-23 | End: 2018-01-29 | Stop reason: SDUPTHER

## 2018-01-23 RX ORDER — HYDRALAZINE HYDROCHLORIDE 50 MG/1
50 TABLET, FILM COATED ORAL EVERY 8 HOURS SCHEDULED
Qty: 90 TABLET | Refills: 3 | Status: SHIPPED | OUTPATIENT
Start: 2018-01-23 | End: 2018-04-05 | Stop reason: SDUPTHER

## 2018-01-23 RX ADMIN — Medication 10 ML: at 10:07

## 2018-01-23 RX ADMIN — CITALOPRAM HYDROBROMIDE 10 MG: 20 TABLET ORAL at 10:06

## 2018-01-23 RX ADMIN — LISINOPRIL 40 MG: 20 TABLET ORAL at 10:06

## 2018-01-23 RX ADMIN — ASPIRIN 81 MG 81 MG: 81 TABLET ORAL at 10:06

## 2018-01-23 RX ADMIN — CALCIUM CARBONATE-CHOLECALCIFEROL TAB 250 MG-125 UNIT 500 MG: 250-125 TAB at 10:05

## 2018-01-23 RX ADMIN — VITAMIN D, TAB 1000IU (100/BT) 1000 UNITS: 25 TAB at 10:05

## 2018-01-23 RX ADMIN — HYDROCODONE BITARTRATE AND ACETAMINOPHEN 1 TABLET: 5; 325 TABLET ORAL at 03:23

## 2018-01-23 RX ADMIN — FUROSEMIDE 40 MG: 10 INJECTION, SOLUTION INTRAVENOUS at 10:08

## 2018-01-23 RX ADMIN — APIXABAN 5 MG: 5 TABLET, FILM COATED ORAL at 10:05

## 2018-01-23 RX ADMIN — FAMOTIDINE 20 MG: 20 TABLET ORAL at 10:06

## 2018-01-23 RX ADMIN — HYDRALAZINE HYDROCHLORIDE 50 MG: 50 TABLET, FILM COATED ORAL at 07:20

## 2018-01-23 ASSESSMENT — PAIN SCALES - GENERAL
PAINLEVEL_OUTOF10: 0
PAINLEVEL_OUTOF10: 5
PAINLEVEL_OUTOF10: 0

## 2018-01-23 ASSESSMENT — PAIN DESCRIPTION - PAIN TYPE: TYPE: CHRONIC PAIN

## 2018-01-23 ASSESSMENT — PAIN DESCRIPTION - LOCATION: LOCATION: BACK

## 2018-01-23 ASSESSMENT — PAIN DESCRIPTION - DESCRIPTORS: DESCRIPTORS: ACHING

## 2018-01-23 NOTE — DISCHARGE INSTR - DIET
 Good nutrition is important when healing from an illness, injury, or surgery. Follow any nutrition recommendations given to you during your hospital stay.  If you were given an oral nutrition supplement while in the hospital, continue to take this supplement at home. You can take it with meals, in-between meals, and/or before bedtime. These supplements can be purchased at most local grocery stores, pharmacies, and chain [employer]-stores. If you have any questions about your diet or nutrition, call the hospital and ask for the dietitian.   AS TOLERATED

## 2018-01-24 ENCOUNTER — CARE COORDINATION (OUTPATIENT)
Dept: CASE MANAGEMENT | Age: 68
End: 2018-01-24

## 2018-01-24 DIAGNOSIS — R06.02 SHORTNESS OF BREATH: Primary | ICD-10-CM

## 2018-01-24 PROCEDURE — 1111F DSCHRG MED/CURRENT MED MERGE: CPT

## 2018-01-25 ENCOUNTER — HOSPITAL ENCOUNTER (OUTPATIENT)
Age: 68
Setting detail: SPECIMEN
Discharge: HOME | End: 2018-01-25
Payer: MEDICARE

## 2018-01-25 DIAGNOSIS — I50.33 ACUTE ON CHRONIC DIASTOLIC CONGESTIVE HEART FAILURE (HCC): ICD-10-CM

## 2018-01-25 DIAGNOSIS — M77.9 INFLAMMATION AROUND JOINT: ICD-10-CM

## 2018-01-25 DIAGNOSIS — I10 ESSENTIAL HYPERTENSION: ICD-10-CM

## 2018-01-25 LAB
ANION GAP SERPL CALCULATED.3IONS-SCNC: 15 MMOL/L (ref 9–17)
BUN BLDV-MCNC: 25 MG/DL (ref 8–23)
BUN/CREAT BLD: ABNORMAL (ref 9–20)
C-REACTIVE PROTEIN: 0.5 MG/L (ref 0–5)
CALCIUM SERPL-MCNC: 8.6 MG/DL (ref 8.6–10.4)
CHLORIDE BLD-SCNC: 105 MMOL/L (ref 98–107)
CO2: 25 MMOL/L (ref 20–31)
CREAT SERPL-MCNC: 0.88 MG/DL (ref 0.5–0.9)
GFR AFRICAN AMERICAN: >60 ML/MIN
GFR NON-AFRICAN AMERICAN: >60 ML/MIN
GFR SERPL CREATININE-BSD FRML MDRD: ABNORMAL ML/MIN/{1.73_M2}
GFR SERPL CREATININE-BSD FRML MDRD: ABNORMAL ML/MIN/{1.73_M2}
GLUCOSE BLD-MCNC: 107 MG/DL (ref 70–99)
POTASSIUM SERPL-SCNC: 3.9 MMOL/L (ref 3.7–5.3)
RHEUMATOID FACTOR: <10 IU/ML
SEDIMENTATION RATE, ERYTHROCYTE: 5 MM (ref 0–20)
SODIUM BLD-SCNC: 145 MMOL/L (ref 135–144)

## 2018-01-26 LAB — ANTI-NUCLEAR ANTIBODY (ANA): NEGATIVE

## 2018-01-29 ENCOUNTER — OFFICE VISIT (OUTPATIENT)
Dept: INTERNAL MEDICINE CLINIC | Age: 68
End: 2018-01-29
Payer: MEDICARE

## 2018-01-29 VITALS
HEIGHT: 64 IN | WEIGHT: 191 LBS | BODY MASS INDEX: 32.61 KG/M2 | DIASTOLIC BLOOD PRESSURE: 60 MMHG | HEART RATE: 62 BPM | SYSTOLIC BLOOD PRESSURE: 128 MMHG | OXYGEN SATURATION: 98 %

## 2018-01-29 DIAGNOSIS — G89.29 CHRONIC MIDLINE LOW BACK PAIN WITHOUT SCIATICA: ICD-10-CM

## 2018-01-29 DIAGNOSIS — I50.32 CHRONIC DIASTOLIC CONGESTIVE HEART FAILURE (HCC): Primary | ICD-10-CM

## 2018-01-29 DIAGNOSIS — I82.5Y3 CHRONIC DEEP VEIN THROMBOSIS (DVT) OF PROXIMAL VEIN OF BOTH LOWER EXTREMITIES (HCC): ICD-10-CM

## 2018-01-29 DIAGNOSIS — I50.32 CHRONIC DIASTOLIC HEART FAILURE (HCC): ICD-10-CM

## 2018-01-29 DIAGNOSIS — M54.50 CHRONIC MIDLINE LOW BACK PAIN WITHOUT SCIATICA: ICD-10-CM

## 2018-01-29 DIAGNOSIS — I10 ESSENTIAL HYPERTENSION: ICD-10-CM

## 2018-01-29 PROCEDURE — 99496 TRANSJ CARE MGMT HIGH F2F 7D: CPT | Performed by: INTERNAL MEDICINE

## 2018-01-29 PROCEDURE — 1111F DSCHRG MED/CURRENT MED MERGE: CPT | Performed by: INTERNAL MEDICINE

## 2018-01-29 RX ORDER — FUROSEMIDE 40 MG/1
40 TABLET ORAL DAILY
Qty: 30 TABLET | Refills: 3 | Status: SHIPPED | OUTPATIENT
Start: 2018-01-29 | End: 2018-04-05

## 2018-01-29 RX ORDER — FUROSEMIDE 20 MG/1
40 TABLET ORAL DAILY
Qty: 28 TABLET | Refills: 0 | Status: CANCELLED | OUTPATIENT
Start: 2018-01-29

## 2018-01-29 NOTE — PROGRESS NOTES
Post-Discharge Transitional Care Management Services      Samm Nunes   YOB: 1950    Date of Office Visit:  1/29/2018  Date of Hospital Admission: 1/21/18  Date of Hospital Discharge: 1/23/18  [place] Risk Score [risk of hospital readmission >=10  medium risk (chance of readmission ~ 12%) >14  high risk (chance of readmission ~18%)]:Risk Score: 20.75    Care management risk score Rising risk (score 2-5) and Complex Care (Scores >=6): 6       Patient Active Problem List   Diagnosis    Other specified disorders of rotator cuff syndrome of shoulder and allied disorders    Diverticulosis of large intestine    Intestinal or peritoneal adhesions with obstruction (postoperative) (postinfection)    Restless legs syndrome (RLS)    GERD (gastroesophageal reflux disease)    Essential hypertension    Mixed hyperlipidemia    Other abnormal glucose    Atherosclerosis    Lateral epicondylitis    Allergic rhinitis    Vitamin D deficiency    Depression    Sinusitis    Degenerative joint disease (DJD) of hip    Edema extremities    Injury of foot, left    Facial droop    CVA (cerebral vascular accident) ([address]    BIN (acute kidney injury) ([address]    Bradycardia    Ataxia    Aphasia    TIA (transient ischemic attack)    Need for prophylactic vaccination and inoculation against cholera alone    Chest pain    Osteopenia    Lumbago    Facet arthritis of lumbar region ([address]    Meralgia paraesthetica, right    DDD (degenerative disc disease), lumbar    Spondylosis of lumbar region without myelopathy or radiculopathy    Blood poisoning ([address]    Cellulitis of left lower extremity    Encounter for medication monitoring    Deep vein thrombosis (DVT) of right lower extremity ([address]    Lumbar facet arthropathy    Shortness of breath    Chronic deep vein thrombosis (DVT) of proximal vein of both lower extremities (HCC)    Chronic diastolic heart failure (HCC)       Allergies   Allergen Reactions    Bactrim [Sulfamethoxazole-Trimethoprim] Other (See Comments)     sepsis    Codeine Itching       Medications listed as ordered at the time of discharge from hospital   [place], [address] [facility]  Medication Instructions MEENAKSHI:    Printed on:01/29/18 7370   Medication Information                      apixaban (ELIQUIS) 5 MG TABS tablet  Take 1 tablet by mouth 2 times daily             aspirin 81 MG chewable tablet  Take 81 mg by mouth daily             atorvastatin (LIPITOR) 80 MG tablet  Take 1 tablet by mouth nightly             Calcium Carbonate-Vitamin D (CALCIUM 500/D) 500-125 MG-UNIT TABS  Take 1 tablet by mouth 2 times daily              citalopram (CELEXA) 10 MG tablet  Take 1 tablet by mouth daily             Cyanocobalamin (VITAMIN B 12 PO)  Take by mouth             cyclobenzaprine (FLEXERIL) 10 MG tablet  Take 1 tablet by mouth 3 times daily as needed for Muscle spasms             famotidine (PEPCID) 20 MG tablet  Take 1 tablet by mouth 2 times daily             fenofibrate micronized (LOFIBRA) 134 MG capsule  Take 1 capsule by mouth every morning (before breakfast)             furosemide (LASIX) 40 MG tablet  Take 1 tablet by mouth daily             hydrALAZINE (APRESOLINE) 50 MG tablet  Take 1 tablet by mouth every 8 hours             HYDROcodone-acetaminophen (NORCO) 5-325 MG per tablet  Take 1 tablet by mouth every 12 hours as needed for Pain for up to 30 days.              lisinopril (PRINIVIL;ZESTRIL) 40 MG tablet  Take 1 tablet by mouth daily             nitroGLYCERIN (NITROSTAT) 0.4 MG SL tablet  Place 1 tablet under the tongue every 5 minutes as needed for Chest pain             pramipexole (MIRAPEX) 1 MG tablet  Take 1.5 tablets by mouth daily             vitamin D (CHOLECALCIFEROL) 1000 UNIT TABS tablet  Take 1 tablet by mouth daily                   Medications marked \"taking\" at this time  Outpatient Prescriptions Marked as Taking for the 1/29/18 encounter (Office Visit) with [employer] Tiffany Gallegos MD   Medication Sig Dispense Refill    furosemide (LASIX) 40 MG tablet Take 1 tablet by mouth daily 30 tablet 3    hydrALAZINE (APRESOLINE) 50 MG tablet Take 1 tablet by mouth every 8 hours 90 tablet 3    HYDROcodone-acetaminophen (NORCO) 5-325 MG per tablet Take 1 tablet by mouth every 12 hours as needed for Pain for up to 30 days. 60 tablet 0    Cyanocobalamin (VITAMIN B 12 PO) Take by mouth      nitroGLYCERIN (NITROSTAT) 0.4 MG SL tablet Place 1 tablet under the tongue every 5 minutes as needed for Chest pain 75 tablet 3    vitamin D (CHOLECALCIFEROL) 1000 UNIT TABS tablet Take 1 tablet by mouth daily 90 tablet 3    fenofibrate micronized (LOFIBRA) 134 MG capsule Take 1 capsule by mouth every morning (before breakfast) 90 capsule 3    atorvastatin (LIPITOR) 80 MG tablet Take 1 tablet by mouth nightly 90 tablet 3    cyclobenzaprine (FLEXERIL) 10 MG tablet Take 1 tablet by mouth 3 times daily as needed for Muscle spasms 270 tablet 3    famotidine (PEPCID) 20 MG tablet Take 1 tablet by mouth 2 times daily 180 tablet 3    citalopram (CELEXA) 10 MG tablet Take 1 tablet by mouth daily 90 tablet 3    pramipexole (MIRAPEX) 1 MG tablet Take 1.5 tablets by mouth daily 90 tablet 3    lisinopril (PRINIVIL;ZESTRIL) 40 MG tablet Take 1 tablet by mouth daily 90 tablet 3    apixaban (ELIQUIS) 5 MG TABS tablet Take 1 tablet by mouth 2 times daily 180 tablet 3    aspirin 81 MG chewable tablet Take 81 mg by mouth daily      Calcium Carbonate-Vitamin D (CALCIUM 500/D) 500-125 MG-UNIT TABS Take 1 tablet by mouth 2 times daily           Medications patient taking as of now reconciled against medications ordered at time of hospital discharge - Yes     Vitals:    01/29/18 1443   BP: 128/60   Pulse: 62   SpO2: 98%   Weight: 191 lb (86.6 kg)   Height: 5' 3.78\" (1.62 m)     Body mass index is 33.01 kg/m².      Wt Readings from Last 3 Encounters:   01/29/18 191 lb (86.6 kg)   01/23/18 192 lb 14.4 oz (87.5 kg) 01/15/18 187 lb (84.8 kg)     BP Readings from Last 3 Encounters:   01/29/18 128/60   01/23/18 (!) 173/67   01/15/18 (!) 155/78        Inpatient course: Discharge summary reviewed- see chart. Chief Complaint   Patient presents with    Follow-Up from Hospital     pt was admitted to [facility] from 01/21/18-01/23/18 for sob and bilateral leg swelling    Other     transitional care    Results     pt had labs done recently       HPI- patient is here for transitional visit, [facility] [address] [place] [address] [facility], she had chest x-ray, CT chest which was negative for pulmonary embolism. She had echocardiogram suggestive of diastolic heart failure. She is started on Lasix. Patient feels that her shortness of breath and swelling in legs is improving.   She had BMP done which is not suggestive of any worsening of kidney function  Review of Systems  Review of Systems - History obtained from the patient  General ROS: negative for - chills, fatigue, fever or hot flashes  Psychological ROS: negative for - anxiety, behavioral disorder or concentration difficulties  Hematological and Lymphatic ROS: negative for - bleeding problems, bruising or fatigue  Endocrine ROS: negative for - hot flashes, palpitations, polydipsia/polyuria or skin changes  Respiratory ROS: negative for - cough, hemoptysis or pleuritic pain, SOB on Excerttion Present   Cardiovascular ROS: negative for - chest pain, dyspnea on exertion, irregular heartbeat or loss of consciousness  Gastrointestinal ROS: negative for - constipation, diarrhea or gas/bloating  Musculoskeletal ROS: positive for - swelling in Legs , Both Pitting Variety   Neurological ROS: negative for - bowel and bladder control changes, confusion, dizziness or gait disturbance  Dermatological ROS: negative for - acne, dry skin, eczema or hair changes    Non face to face  following discharge, date last encounter closed (first attempt may have been earlier):

## 2018-01-31 ENCOUNTER — HOSPITAL ENCOUNTER (OUTPATIENT)
Dept: PAIN MANAGEMENT | Age: 68
Discharge: HOME | End: 2018-01-31
Payer: MEDICARE

## 2018-01-31 VITALS
SYSTOLIC BLOOD PRESSURE: 154 MMHG | TEMPERATURE: 98.7 F | DIASTOLIC BLOOD PRESSURE: 75 MMHG | OXYGEN SATURATION: 97 % | RESPIRATION RATE: 16 BRPM | HEART RATE: 62 BPM | BODY MASS INDEX: 32.61 KG/M2 | WEIGHT: 191 LBS | HEIGHT: 64 IN

## 2018-01-31 DIAGNOSIS — Z51.81 ENCOUNTER FOR MEDICATION MONITORING: ICD-10-CM

## 2018-01-31 DIAGNOSIS — M47.816 LUMBAR FACET ARTHROPATHY: ICD-10-CM

## 2018-01-31 DIAGNOSIS — M51.36 DDD (DEGENERATIVE DISC DISEASE), LUMBAR: ICD-10-CM

## 2018-01-31 DIAGNOSIS — Z51.81 MEDICATION MONITORING ENCOUNTER: ICD-10-CM

## 2018-01-31 DIAGNOSIS — G57.11 MERALGIA PARAESTHETICA, RIGHT: ICD-10-CM

## 2018-01-31 DIAGNOSIS — M47.816 FACET ARTHRITIS OF LUMBAR REGION: Primary | ICD-10-CM

## 2018-01-31 DIAGNOSIS — M47.816 SPONDYLOSIS OF LUMBAR REGION WITHOUT MYELOPATHY OR RADICULOPATHY: ICD-10-CM

## 2018-01-31 PROCEDURE — 99214 OFFICE O/P EST MOD 30 MIN: CPT | Performed by: PAIN MEDICINE

## 2018-01-31 PROCEDURE — 99213 OFFICE O/P EST LOW 20 MIN: CPT

## 2018-01-31 RX ORDER — HYDROCODONE BITARTRATE AND ACETAMINOPHEN 5; 325 MG/1; MG/1
1 TABLET ORAL EVERY 12 HOURS PRN
Qty: 60 TABLET | Refills: 0 | Status: SHIPPED | OUTPATIENT
Start: 2018-02-08 | End: 2018-02-27 | Stop reason: SDUPTHER

## 2018-01-31 ASSESSMENT — PAIN SCALES - GENERAL: PAINLEVEL_OUTOF10: 0

## 2018-01-31 ASSESSMENT — PAIN DESCRIPTION - DESCRIPTORS: DESCRIPTORS: ACHING;CONSTANT

## 2018-01-31 ASSESSMENT — ENCOUNTER SYMPTOMS
DIARRHEA: 0
HEARTBURN: 0
SHORTNESS OF BREATH: 1
GASTROINTESTINAL NEGATIVE: 1
COUGH: 0
PHOTOPHOBIA: 0
BACK PAIN: 1
BLURRED VISION: 0
EYES NEGATIVE: 1
NAUSEA: 0

## 2018-01-31 ASSESSMENT — PAIN DESCRIPTION - DIRECTION: RADIATING_TOWARDS: INTO LEFT HIP

## 2018-01-31 ASSESSMENT — PAIN DESCRIPTION - LOCATION: LOCATION: BACK;HIP

## 2018-01-31 ASSESSMENT — PAIN DESCRIPTION - ORIENTATION: ORIENTATION: LEFT;LOWER

## 2018-01-31 ASSESSMENT — PAIN DESCRIPTION - PAIN TYPE: TYPE: CHRONIC PAIN

## 2018-01-31 ASSESSMENT — PAIN DESCRIPTION - PROGRESSION: CLINICAL_PROGRESSION: GRADUALLY WORSENING

## 2018-01-31 ASSESSMENT — ACTIVITIES OF DAILY LIVING (ADL): EFFECT OF PAIN ON DAILY ACTIVITIES: PAIN INCREASES WITH WALKING

## 2018-01-31 ASSESSMENT — PAIN DESCRIPTION - ONSET: ONSET: ON-GOING

## 2018-01-31 ASSESSMENT — PAIN DESCRIPTION - FREQUENCY: FREQUENCY: CONTINUOUS

## 2018-01-31 NOTE — PROGRESS NOTES
[facility] Pain Management  Patient Pain Assessment  Consultation - No att. providers found    Primary Care Physician: Janes Alfred MD    Chief complaint:   Chief Complaint   Patient presents with    Lower Back Pain     left hip   . HISTORY OF PRESENT ILLNESS:  Chrissie Klein is 80 y.o. female with    Patient return to the pain clinic with a chief complaint of pain involving the low back area the pain is worse in the left lower back. This patient had undergone a RFA of the medial branches at the levels of L2-L3 L4-L5 on the right side on 11/14/2017 with a good improvement in the pain in the right side. She did undergo left medial branch blocks at the levels of L2-L3 L4-L5 on 1/15/2007 18 which gave her 80% pain relief that lasted for about 2 days and since then the pain returned to its original extent. This may produce her pain on the left side is worse and is interfering with her activities of daily living. Patient would like to proceed with RFA of the medial meniscus on the left side. Patient reports the reason for her back. She also reports that she has some problems with her balance. Patient also reports she was in the hospital with congestive heart failure. Back Pain   This is a chronic problem. The current episode started more than 1 year ago. The problem has been gradually worsening since onset. The pain is present in the lumbar spine and sacro-iliac (Left lower lumbar region). The quality of the pain is described as aching and burning. The pain does not radiate (Rt. Groin). The pain is at a severity of 4/10 (1-8 depending on the activity). The pain is moderate. The pain is worse during the day. The symptoms are aggravated by bending, standing, twisting and coughing (walking. , Lifting). Stiffness is present: as day progress. Pertinent negatives include no chest pain, dysuria, fever, headaches, numbness, weakness or weight loss.  Risk factors include sedentary lifestyle, lack of (Oral)   Resp 16   Ht 5' 3.78\" (1.62 m)   Wt 191 lb (86.6 kg)   SpO2 97%   BMI 33.01 kg/m² , Body mass index is 33.01 kg/m². Physical Exam   Constitutional: She is oriented to person, place, and time. She appears well-developed and well-nourished. HENT:   Head: Normocephalic and atraumatic. Eyes: Conjunctivae are normal. Pupils are equal, round, and reactive to light. Neck: Neck supple. No tracheal deviation present. No thyromegaly present. Cardiovascular: Normal rate and regular rhythm. Pulmonary/Chest: Effort normal.   Abdominal: She exhibits no distension. Musculoskeletal: Normal range of motion. Neurological: She is alert and oriented to person, place, and time. She has normal strength. She displays no atrophy, no tremor and normal reflexes. No cranial nerve deficit or sensory deficit. She exhibits normal muscle tone. She displays a negative Romberg sign. Coordination normal.   Reflex Scores:       Patellar reflexes are 1+ on the right side and 1+ on the left side. Achilles reflexes are 1+ on the right side and 1+ on the left side. Skin: Skin is warm and dry. Psychiatric: She has a normal mood and affect. Her speech is normal and behavior is normal. Judgment and thought content normal.    Right Ankle Exam     Muscle Strength   The patient has normal right ankle strength. Left Ankle Exam     Muscle Strength   The patient has normal left ankle strength. Right Knee Exam     Muscle Strength     The patient has normal right knee strength. Left Knee Exam     Muscle Strength     The patient has normal left knee strength. Right Hip Exam     Muscle Strength   The patient has normal right hip strength. Left Hip Exam     Muscle Strength   The patient has normal left hip strength. Back Exam     Tenderness   The patient is experiencing tenderness in the lumbar and sacroiliac. Range of Motion   Back extension: Limited and painful.    Back flexion: Limited and

## 2018-02-05 ENCOUNTER — CARE COORDINATION (OUTPATIENT)
Dept: CASE MANAGEMENT | Age: 68
End: 2018-02-05

## 2018-02-13 ENCOUNTER — HOSPITAL ENCOUNTER (OUTPATIENT)
Dept: GENERAL RADIOLOGY | Age: 68
Discharge: HOME | End: 2018-02-15
Payer: MEDICARE

## 2018-02-13 ENCOUNTER — HOSPITAL ENCOUNTER (OUTPATIENT)
Dept: PAIN MANAGEMENT | Age: 68
Discharge: HOME | End: 2018-02-13
Payer: MEDICARE

## 2018-02-13 VITALS
OXYGEN SATURATION: 98 % | HEIGHT: 63 IN | BODY MASS INDEX: 33.84 KG/M2 | SYSTOLIC BLOOD PRESSURE: 148 MMHG | HEART RATE: 77 BPM | DIASTOLIC BLOOD PRESSURE: 76 MMHG | RESPIRATION RATE: 18 BRPM | TEMPERATURE: 98.6 F | WEIGHT: 191 LBS

## 2018-02-13 DIAGNOSIS — M51.36 DDD (DEGENERATIVE DISC DISEASE), LUMBAR: ICD-10-CM

## 2018-02-13 DIAGNOSIS — M47.816 SPONDYLOSIS OF LUMBAR REGION WITHOUT MYELOPATHY OR RADICULOPATHY: ICD-10-CM

## 2018-02-13 DIAGNOSIS — M47.816 LUMBAR FACET ARTHROPATHY: ICD-10-CM

## 2018-02-13 DIAGNOSIS — M47.816 FACET ARTHRITIS OF LUMBAR REGION: Primary | ICD-10-CM

## 2018-02-13 PROCEDURE — 64635 DESTROY LUMB/SAC FACET JNT: CPT

## 2018-02-13 PROCEDURE — 64636 DESTROY L/S FACET JNT ADDL: CPT

## 2018-02-13 PROCEDURE — 64635 DESTROY LUMB/SAC FACET JNT: CPT | Performed by: PAIN MEDICINE

## 2018-02-13 PROCEDURE — 3209999900 FLUORO FOR SURGICAL PROCEDURES

## 2018-02-13 PROCEDURE — 6360000002 HC RX W HCPCS

## 2018-02-13 PROCEDURE — 2500000003 HC RX 250 WO HCPCS

## 2018-02-13 PROCEDURE — 64636 DESTROY L/S FACET JNT ADDL: CPT | Performed by: PAIN MEDICINE

## 2018-02-13 PROCEDURE — 6360000002 HC RX W HCPCS: Performed by: PAIN MEDICINE

## 2018-02-13 RX ORDER — MIDAZOLAM HYDROCHLORIDE 1 MG/ML
INJECTION INTRAMUSCULAR; INTRAVENOUS
Status: COMPLETED | OUTPATIENT
Start: 2018-02-13 | End: 2018-02-13

## 2018-02-13 RX ORDER — FENTANYL CITRATE 50 UG/ML
INJECTION, SOLUTION INTRAMUSCULAR; INTRAVENOUS
Status: COMPLETED | OUTPATIENT
Start: 2018-02-13 | End: 2018-02-13

## 2018-02-13 RX ADMIN — MIDAZOLAM 2 MG: 1 INJECTION INTRAMUSCULAR; INTRAVENOUS at 08:22

## 2018-02-13 RX ADMIN — FENTANYL CITRATE 50 MCG: 50 INJECTION INTRAMUSCULAR; INTRAVENOUS at 08:42

## 2018-02-13 RX ADMIN — FENTANYL CITRATE 50 MCG: 50 INJECTION INTRAMUSCULAR; INTRAVENOUS at 08:29

## 2018-02-13 ASSESSMENT — PAIN - FUNCTIONAL ASSESSMENT
PAIN_FUNCTIONAL_ASSESSMENT: 0-10
PAIN_FUNCTIONAL_ASSESSMENT: 0-10

## 2018-02-13 ASSESSMENT — PAIN DESCRIPTION - DESCRIPTORS: DESCRIPTORS: ACHING;CONSTANT

## 2018-02-13 ASSESSMENT — PAIN DESCRIPTION - FREQUENCY: FREQUENCY: CONTINUOUS

## 2018-02-13 ASSESSMENT — PAIN DESCRIPTION - PROGRESSION: CLINICAL_PROGRESSION: GRADUALLY WORSENING

## 2018-02-13 ASSESSMENT — PAIN DESCRIPTION - LOCATION: LOCATION: BACK;HIP

## 2018-02-13 ASSESSMENT — PAIN SCALES - GENERAL: PAINLEVEL_OUTOF10: 8

## 2018-02-13 ASSESSMENT — PAIN DESCRIPTION - ONSET: ONSET: ON-GOING

## 2018-02-13 ASSESSMENT — PAIN DESCRIPTION - ORIENTATION: ORIENTATION: LEFT

## 2018-02-13 ASSESSMENT — PAIN DESCRIPTION - PAIN TYPE: TYPE: CHRONIC PAIN

## 2018-02-13 ASSESSMENT — ACTIVITIES OF DAILY LIVING (ADL): EFFECT OF PAIN ON DAILY ACTIVITIES: PAIN INCREASES WITH WALKING

## 2018-02-13 ASSESSMENT — PAIN DESCRIPTION - DIRECTION: RADIATING_TOWARDS: LEFT HIP

## 2018-02-13 NOTE — PROCEDURES
needed. Patient is an appropriate candidate for plan of sedation: yes  Patient's History and Physical examination was reviewed and there is no change. Electronically signed by Clementine Harrison MD on 2/13/2018 at 9:10 AM      Preoperative Diagnosis:    1. Facet arthritis of lumbar region ([address]    2. DDD (degenerative disc disease), lumbar    3. Lumbar facet arthropathy    4. Spondylosis of lumbar region without myelopathy or radiculopathy        Postoperative Diagnosis:    1. Facet arthritis of lumbar region ([address]    2. DDD (degenerative disc disease), lumbar    3. Lumbar facet arthropathy    4. Spondylosis of lumbar region without myelopathy or radiculopathy        Procedure Performed:  :Radiofrequency ablation of median branches at the levels of L2, L3, L4, L5left under fluoroscopic guidance with IV sedation. Indication for the Procedure: The patient has ahistory of chronic low back pain that is not responding well to the conservative treatment. Patient's pain is mostly axial in nature. Pain is interfering with the activities of daily living. Physical examination revealed facet tenderness and facet loading is positive. Patient had undergone lumbar facet joint injections with pain relief that lasted for only a short period of time and had greater than 70% pain relief with confirmatory median branch blocks. Hence we decided to do radiofrequency abalation of median branches for long term pain releif. The procedure and risks  were discussed with the patient and an informed consent was obtained.   Current Pain Assessment  Pain Assessment  Pain Assessment: 0-10  Pain Level: 8  Pain Type: Chronic pain  Pain Location: Back, Hip  Pain Orientation: Left  Pain Radiating Towards: Left hip  Pain Descriptors: Aching, Constant  Pain Frequency: Continuous  Pain Onset: On-going  Clinical Progression: Gradually worsening  Effect of Pain on Daily Activities: Pain increases with walking  Patient's Stated Pain Goal: 2  Pain Intervention(s): Medication (see eMar), Rest  Response to Pain Intervention:  (MBB gave 80% improvement)  RASS Score (Ventilated): Alert and calm  POSS Score (Patient Ctrl Analgesia): 1     Procedure:    After starting an IV, the patient was sedated with 2 mg of Midazolam and 100 mcg of Fentanyl intravenously by the RN under my direct supervision. Patient's vital signs including BP, EKG and SaO2 were monitored by the RN and they remained stable during the procedure. A meaningful communication was kept up with the patient throughout the procedure. The patient is placed in prone position and skin over the back was prepped and draped in sterile manner. Then using fluoroscopy the junction of the transverse process of the vertebra with the superior process of the facet joint was observed and the view was optimized. The skin and deep tissues posterior were infiltrated with 3 ml of  0.5% Marcaine. For the procedure the [place] venom leads were used The RF canula with the 5 mm active tip was introduced through the skin wheal under fluoroscopy guidance such that the tip of the needle lies in the groove of the transverse process with the superior processes of the facet joint. Then a lateral view of the lumbar spine was obtained to make sure the tip of needle is not in the neural foramen. Then electric impedence was checked to make sure it is acceptable. Then a sensory stimulus was applied at 50 Hz up to 1 volt and concordant pain symptoms were reproduced. Then a motor stimulus was applied at 2 Hz up to 2 volts and no motor stimulation was seen in lower extremities. Some multifidus stimulus was seen. Then after negative aspiration a mixture of Kenalog and 0.5%  Marcaine was injected through the needle. Then a initial lesion was done at 80 degrees centigrade for 90 seconds.   Then the needle was repositioned such that it lies somewaht superior and medial to the origional position and a second lesion was

## 2018-02-14 ENCOUNTER — TELEPHONE (OUTPATIENT)
Dept: PAIN MANAGEMENT | Age: 68
End: 2018-02-14

## 2018-02-14 NOTE — TELEPHONE ENCOUNTER
Doing well, has flushed face, explained to pt. Just side affects, will go away, can take benadryl if needed.

## 2018-02-27 ENCOUNTER — HOSPITAL ENCOUNTER (OUTPATIENT)
Dept: PAIN MANAGEMENT | Age: 68
Discharge: HOME | End: 2018-02-27
Payer: MEDICARE

## 2018-02-27 VITALS
HEART RATE: 75 BPM | SYSTOLIC BLOOD PRESSURE: 153 MMHG | TEMPERATURE: 97.6 F | OXYGEN SATURATION: 97 % | WEIGHT: 191 LBS | RESPIRATION RATE: 16 BRPM | BODY MASS INDEX: 33.84 KG/M2 | DIASTOLIC BLOOD PRESSURE: 95 MMHG | HEIGHT: 63 IN

## 2018-02-27 DIAGNOSIS — M47.816 FACET ARTHRITIS OF LUMBAR REGION: ICD-10-CM

## 2018-02-27 DIAGNOSIS — Z51.81 MEDICATION MONITORING ENCOUNTER: ICD-10-CM

## 2018-02-27 DIAGNOSIS — M51.36 DDD (DEGENERATIVE DISC DISEASE), LUMBAR: ICD-10-CM

## 2018-02-27 DIAGNOSIS — M16.11 PRIMARY OSTEOARTHRITIS OF RIGHT HIP: ICD-10-CM

## 2018-02-27 DIAGNOSIS — M47.816 SPONDYLOSIS OF LUMBAR REGION WITHOUT MYELOPATHY OR RADICULOPATHY: ICD-10-CM

## 2018-02-27 DIAGNOSIS — G57.11 MERALGIA PARAESTHETICA, RIGHT: Primary | ICD-10-CM

## 2018-02-27 DIAGNOSIS — M47.816 LUMBAR FACET ARTHROPATHY: ICD-10-CM

## 2018-02-27 DIAGNOSIS — Z51.81 ENCOUNTER FOR MEDICATION MONITORING: ICD-10-CM

## 2018-02-27 PROCEDURE — 99214 OFFICE O/P EST MOD 30 MIN: CPT | Performed by: PAIN MEDICINE

## 2018-02-27 PROCEDURE — 99213 OFFICE O/P EST LOW 20 MIN: CPT

## 2018-02-27 RX ORDER — HYDROCODONE BITARTRATE AND ACETAMINOPHEN 5; 325 MG/1; MG/1
1 TABLET ORAL EVERY 12 HOURS PRN
Qty: 60 TABLET | Refills: 0 | Status: SHIPPED | OUTPATIENT
Start: 2018-03-10 | End: 2018-03-21 | Stop reason: SDUPTHER

## 2018-02-27 ASSESSMENT — PAIN DESCRIPTION - PROGRESSION: CLINICAL_PROGRESSION: GRADUALLY WORSENING

## 2018-02-27 ASSESSMENT — ENCOUNTER SYMPTOMS
GASTROINTESTINAL NEGATIVE: 1
COUGH: 0
EYES NEGATIVE: 1
SPUTUM PRODUCTION: 0
BACK PAIN: 1
VOMITING: 0
RESPIRATORY NEGATIVE: 1
NAUSEA: 0
BLURRED VISION: 0
CONSTIPATION: 0
PHOTOPHOBIA: 0
HEARTBURN: 0

## 2018-02-27 ASSESSMENT — PAIN DESCRIPTION - LOCATION: LOCATION: BACK;HIP;LEG

## 2018-02-27 ASSESSMENT — ACTIVITIES OF DAILY LIVING (ADL): EFFECT OF PAIN ON DAILY ACTIVITIES: PAIN INCREASES WITH WALKING

## 2018-02-27 ASSESSMENT — PAIN DESCRIPTION - FREQUENCY: FREQUENCY: CONTINUOUS

## 2018-02-27 ASSESSMENT — PAIN DESCRIPTION - ORIENTATION: ORIENTATION: LEFT;LOWER;RIGHT

## 2018-02-27 ASSESSMENT — PAIN SCALES - GENERAL: PAINLEVEL_OUTOF10: 6

## 2018-02-27 ASSESSMENT — PAIN DESCRIPTION - ONSET: ONSET: ON-GOING

## 2018-02-27 ASSESSMENT — PAIN DESCRIPTION - PAIN TYPE: TYPE: CHRONIC PAIN

## 2018-02-27 NOTE — PROGRESS NOTES
[facility] Pain Management  Patient Pain Assessment  RECHECK - No att. providers found    Primary Care Physician: Nick Kaba MD    Chief complaint:   Chief Complaint   Patient presents with    Lower Back Pain   . HISTORY OF PRESENT ILLNESS:  Siomara Russo is 80 y.o. female with    Patient return to the pain clinic with a chief complaint of pain involving the low back the pain on the left side radiates towards the left knee. She reports she is developing pain in her right leg with the numbness and burning for the last 1 week or so. She had undergone RFA of the right medial branches of the facet joints in November. She reports she has 2 months of good pain relief. Her pain is slowly returning again. She had undergone not RFA of the left medial branches at the levels of L2-L3 L4-L5 on 2/13/2018. She still having pain and soreness in the low back on the left side. The pain in the low back radiates to both hips more soreness on the left side. The pain is aggravated with any activity and decreased a certain extent when she uses ice pack. Her right thigh is numb and she feels jolts along the anterior lateral left of the right thigh radiating up to the knee. She reports it feels numb but she can feel the touch. This patient also reports poor sleep patterns because of the pain. Back Pain   This is a chronic problem. The current episode started more than 1 year ago. The problem occurs constantly. The problem is unchanged (Still having considerable soreness on the left side). The pain is present in the lumbar spine and sacro-iliac. The quality of the pain is described as burning, aching and shooting (Sore). The pain radiates to the right thigh (Paresthesias over the anterior lateral thigh to the knee). The pain is at a severity of 6/10. The pain is moderate (Moderate to severe). The pain is the same all the time.  The symptoms are aggravated by bending, sitting and standing (Walking, lifting and activities of daily). Stiffness is present all day. Associated symptoms include numbness and weakness. Pertinent negatives include no chest pain, dysuria, fever, tingling or weight loss. (Numbness and paresthesias over the anterior lateral thigh on the right side) Risk factors include obesity (Former smoker). OARRS compliant?  yes  Concern for prescription abuse?no    Current Pain Assessment  Pain Assessment  Pain Assessment: 0-10  Pain Level: 6  Pain Type: Chronic pain  Pain Location: Back, Hip, Leg  Pain Orientation: Left, Lower, Right  Pain Radiating Towards: left leg to knee and right upper leg  Pain Descriptors: Constant, Aching, Numbness, Burning, Sore, Shooting  Pain Frequency: Continuous  Pain Onset: On-going  Clinical Progression: Gradually worsening  Effect of Pain on Daily Activities: Pain increases with walking  Patient's Stated Pain Goal: 2 (Decrease pain and increase activity)  Pain Intervention(s): Medication (see eMar), Repositioned, Rest, Cold applied                    ADVERSE MEDICATION EFFECTS:   Constipation: no  Bowel Regimen: No:   Diet: common adult  Appetite:  ok  Sedation:  no  Urinary Retention: no    FOCUSED PAIN SCALE:  Highest : 8  Lowest :2  Average: Range-5  When and What  was your last procedure: 2/13/18 Left RFL L2 L3 L4 L5     Was your procedure effective:  no    ACTIVITY/SOCIAL/EMOTIONAL:  Sleep Pattern: 4 hours per night. nightime awakenings  Energy Level:  Tired/Fatigued  Currently attending Physical Therapy:  No  Home Exercises: daily walking  Mobility: no current mobility problem   Currently seeing a Psychiatrist or Psychologist:  No  Emotional Issues: normal   Mood: appropriate     ABERRANT BEHAVIORS SINCE LAST VISIT:  Have you ever been treated in another Pain Clinic not applicable  Refills for prescriptions appropriate: yes  Lost rx/pills: no  Taking more medication than prescribed:  yes  Are you receiving PAIN medications from  other doctors: no  Last Urine/Serum Drug raise left: negative    Other   Sensation: normal  Gait: antalgic   Erythema: no back redness  Scars: absent    Comments: There is slight decreased sensation to light touch over the anterior lateral aspect of the right thigh. Examination of motor system and sensory system otherwise did not reveal any deficits. Nurses Notes and Vital Signs reviewed. DATA  Labs:  Benzodiazepine Screen, Urine   Date Value Ref Range Status   08/05/2015 NEGATIVE NEG Final     Comment:           (Positive cutoff 200 ng/mL)                      Imaging:  Radiology Images and Reports reviewed where indicated and necessary     Technique: Multiplanar multisequence MR imaging of the lumbar spine was performed without intravenous contrast.       Findings: Minimal anterolisthesis of L3 on L4. Visualized bowel cord demonstrates no evidence for cord edema. Conus terminus at approximately L1-L2 level. Cauda equina nerve roots follow spinal curvature.       Disc desiccation all levels.  Slight heterogeneity of the marrow signal on T1-weighted imaging without evidence for marrow edema to suggest acute fractures.       L1-L2 level: Bilateral facet arthrosis with broad-based is bulge noted resulting in mild deformity and flattening of the ventral thecal sac with mild bilateral neuroforaminal narrowing       L2-L3 level: Mild facet arthrosis with ligamentum flavum thickening and broad-based is bulge without significant central canal stenosis or neuroforaminal narrowing.       L3-L4 level: Bilateral facet hypertrophy with ligamentum flavum thickening and broad-based is bulge with mild bilateral neuroforaminal narrowing without significant central canal stenosis.       L4-L5 level: Bilateral facet arthrosis with ligamentum flavum thickening and right-sided facet joint effusion and a broad-based disc bulge with mild edema in the right pedicle of L4 vertebral body likely relate with facet joint degenerative changes with    mild bilateral

## 2018-03-06 ENCOUNTER — HOSPITAL ENCOUNTER (OUTPATIENT)
Dept: PAIN MANAGEMENT | Age: 68
Discharge: HOME | End: 2018-03-06
Payer: MEDICARE

## 2018-03-06 VITALS
OXYGEN SATURATION: 97 % | DIASTOLIC BLOOD PRESSURE: 59 MMHG | TEMPERATURE: 98.3 F | RESPIRATION RATE: 16 BRPM | WEIGHT: 191 LBS | HEART RATE: 74 BPM | SYSTOLIC BLOOD PRESSURE: 158 MMHG | HEIGHT: 63 IN | BODY MASS INDEX: 33.84 KG/M2

## 2018-03-06 DIAGNOSIS — G57.11 MERALGIA PARAESTHETICA, RIGHT: Primary | ICD-10-CM

## 2018-03-06 PROCEDURE — 2500000003 HC RX 250 WO HCPCS

## 2018-03-06 PROCEDURE — 76998 US GUIDE INTRAOP: CPT

## 2018-03-06 PROCEDURE — 64450 NJX AA&/STRD OTHER PN/BRANCH: CPT | Performed by: PAIN MEDICINE

## 2018-03-06 PROCEDURE — 6360000002 HC RX W HCPCS

## 2018-03-06 PROCEDURE — 64450 NJX AA&/STRD OTHER PN/BRANCH: CPT

## 2018-03-06 ASSESSMENT — PAIN DESCRIPTION - ORIENTATION: ORIENTATION: LEFT

## 2018-03-06 ASSESSMENT — PAIN DESCRIPTION - ONSET: ONSET: ON-GOING

## 2018-03-06 ASSESSMENT — PAIN DESCRIPTION - PROGRESSION: CLINICAL_PROGRESSION: GRADUALLY WORSENING

## 2018-03-06 ASSESSMENT — PAIN DESCRIPTION - PAIN TYPE: TYPE: CHRONIC PAIN

## 2018-03-06 ASSESSMENT — PAIN DESCRIPTION - DESCRIPTORS
DESCRIPTORS: BURNING;SHOOTING
DESCRIPTORS: TENDER

## 2018-03-06 ASSESSMENT — PAIN DESCRIPTION - LOCATION: LOCATION: LEG;BACK

## 2018-03-06 ASSESSMENT — PAIN SCALES - GENERAL: PAINLEVEL_OUTOF10: 5

## 2018-03-06 ASSESSMENT — PAIN DESCRIPTION - FREQUENCY: FREQUENCY: CONTINUOUS

## 2018-03-06 ASSESSMENT — PAIN DESCRIPTION - DIRECTION: RADIATING_TOWARDS: RIGHT LEG TO KNEE

## 2018-03-06 ASSESSMENT — PAIN - FUNCTIONAL ASSESSMENT: PAIN_FUNCTIONAL_ASSESSMENT: 0-10

## 2018-03-06 ASSESSMENT — ACTIVITIES OF DAILY LIVING (ADL): EFFECT OF PAIN ON DAILY ACTIVITIES: PAIN INCREASES WITH STANDING AND WALKING

## 2018-03-06 NOTE — PROCEDURES
of MI (myocardial infarction); History of ovarian cyst; History of peritonitis; HTN (hypertension); Hx of blood clots; Hyperlipidemia; Intestinal or peritoneal adhesions with obstruction (postoperative) (postinfection); Kidney infection; Lateral epicondylitis  of elbow; Muscle strain; Other abnormal glucose; Restless legs syndrome (RLS); Vitamin D deficiency; and Wears glasses. Past Surgical History:   has a past surgical history that includes Cardiac catheterization; Ovary removal (1970); colectomy (1969); Appendectomy (1968); Ovary removal (1971); Hysterectomy (1973); Abdomen surgery (1976); Cardiac catheterization (2005); Bunionectomy (Left); sinus surgery (2004); Colonoscopy; other surgical history (8/14/14); and cyst removal (Right). Pre-Sedation Documentation and Exam:   Vital signs have been reviewed (see flow sheet for vitals). Mallampati Airway Assessment:  normal    ASA Classification:  Class 3 - A patient with severe systemic disease that limits activity but is not incapacitating    Sedation/ Anesthesia Plan:   intravenous sedation   as needed. Medications Planned:   midazolam (Versed) / Fentanyl  Intravenously  as needed. Patient is an appropriate candidate for plan of sedation: yes  Patient's History and Physical examination was reviewed and there is no change. Electronically signed by Librado Cleveland MD on 3/6/2018 at 8:54 AM        Preoperative Diagnosis: right meralgia paraesthetica. Postoperative Diagnosis:  right meralgia paraesthetica. Procedure Performed:  right lateral femoral cutaneous nerve block. Indication for the Procedure: The patient failed conservative treatment for meralgia paraesthetica.    Current Pain Assessment  Pain Assessment  Pain Assessment: 0-10  Pain Level: 5  Pain Type: Chronic pain  Pain Location: Leg, Back  Pain Orientation:  (left low back, right leg)  Pain Radiating Towards: right leg to knee  Pain Descriptors: Burning, Shooting  Pain

## 2018-03-21 ENCOUNTER — TELEPHONE (OUTPATIENT)
Dept: PAIN MANAGEMENT | Age: 68
End: 2018-03-21

## 2018-03-21 ENCOUNTER — HOSPITAL ENCOUNTER (OUTPATIENT)
Age: 68
Setting detail: SPECIMEN
Discharge: HOME | End: 2018-03-21
Payer: MEDICARE

## 2018-03-21 ENCOUNTER — HOSPITAL ENCOUNTER (OUTPATIENT)
Dept: PAIN MANAGEMENT | Age: 68
Discharge: HOME | End: 2018-03-21
Payer: MEDICARE

## 2018-03-21 VITALS
RESPIRATION RATE: 16 BRPM | DIASTOLIC BLOOD PRESSURE: 70 MMHG | HEIGHT: 63 IN | TEMPERATURE: 98.5 F | WEIGHT: 191 LBS | HEART RATE: 76 BPM | SYSTOLIC BLOOD PRESSURE: 160 MMHG | OXYGEN SATURATION: 97 % | BODY MASS INDEX: 33.84 KG/M2

## 2018-03-21 DIAGNOSIS — Z51.81 ENCOUNTER FOR MEDICATION MONITORING: ICD-10-CM

## 2018-03-21 DIAGNOSIS — M51.36 DDD (DEGENERATIVE DISC DISEASE), LUMBAR: ICD-10-CM

## 2018-03-21 DIAGNOSIS — I50.32 CHRONIC DIASTOLIC CONGESTIVE HEART FAILURE (HCC): ICD-10-CM

## 2018-03-21 DIAGNOSIS — G57.11 MERALGIA PARAESTHETICA, RIGHT: ICD-10-CM

## 2018-03-21 DIAGNOSIS — Z51.81 MEDICATION MONITORING ENCOUNTER: ICD-10-CM

## 2018-03-21 DIAGNOSIS — M47.816 FACET ARTHRITIS OF LUMBAR REGION: ICD-10-CM

## 2018-03-21 DIAGNOSIS — M47.816 SPONDYLOSIS OF LUMBAR REGION WITHOUT MYELOPATHY OR RADICULOPATHY: Primary | ICD-10-CM

## 2018-03-21 DIAGNOSIS — M47.816 LUMBAR FACET ARTHROPATHY: ICD-10-CM

## 2018-03-21 LAB
ANION GAP SERPL CALCULATED.3IONS-SCNC: 15 MMOL/L (ref 9–17)
BUN BLDV-MCNC: 55 MG/DL (ref 8–23)
BUN/CREAT BLD: ABNORMAL (ref 9–20)
CALCIUM SERPL-MCNC: 9.1 MG/DL (ref 8.6–10.4)
CHLORIDE BLD-SCNC: 102 MMOL/L (ref 98–107)
CO2: 24 MMOL/L (ref 20–31)
CREAT SERPL-MCNC: 1.11 MG/DL (ref 0.5–0.9)
GFR AFRICAN AMERICAN: 59 ML/MIN
GFR NON-AFRICAN AMERICAN: 49 ML/MIN
GFR SERPL CREATININE-BSD FRML MDRD: ABNORMAL ML/MIN/{1.73_M2}
GFR SERPL CREATININE-BSD FRML MDRD: ABNORMAL ML/MIN/{1.73_M2}
GLUCOSE BLD-MCNC: 112 MG/DL (ref 70–99)
POTASSIUM SERPL-SCNC: 4.8 MMOL/L (ref 3.7–5.3)
SODIUM BLD-SCNC: 141 MMOL/L (ref 135–144)

## 2018-03-21 PROCEDURE — 99213 OFFICE O/P EST LOW 20 MIN: CPT

## 2018-03-21 PROCEDURE — 99214 OFFICE O/P EST MOD 30 MIN: CPT | Performed by: PAIN MEDICINE

## 2018-03-21 RX ORDER — HYDROCODONE BITARTRATE AND ACETAMINOPHEN 5; 325 MG/1; MG/1
1 TABLET ORAL EVERY 12 HOURS PRN
Qty: 60 TABLET | Refills: 0 | Status: SHIPPED | OUTPATIENT
Start: 2018-04-09 | End: 2018-05-08 | Stop reason: SDUPTHER

## 2018-03-21 RX ORDER — MELOXICAM 15 MG/1
15 TABLET ORAL DAILY
Qty: 30 TABLET | Refills: 3 | Status: SHIPPED | OUTPATIENT
Start: 2018-03-21 | End: 2018-03-22 | Stop reason: ALTCHOICE

## 2018-03-21 ASSESSMENT — ENCOUNTER SYMPTOMS
STRIDOR: 0
SHORTNESS OF BREATH: 1
DOUBLE VISION: 0
VOMITING: 0
DIARRHEA: 0
BLURRED VISION: 0
WHEEZING: 0
ABDOMINAL PAIN: 0
EYE REDNESS: 0
SINUS PAIN: 1
HEARTBURN: 1
HEMOPTYSIS: 0
EYES NEGATIVE: 1
SORE THROAT: 0
BLOOD IN STOOL: 0
PHOTOPHOBIA: 0
EYE DISCHARGE: 0
CONSTIPATION: 0
NAUSEA: 0
EYE PAIN: 0
BACK PAIN: 1
COUGH: 0
SPUTUM PRODUCTION: 0
ORTHOPNEA: 0

## 2018-03-21 ASSESSMENT — PAIN DESCRIPTION - LOCATION: LOCATION: BACK;LEG

## 2018-03-21 ASSESSMENT — PAIN DESCRIPTION - DESCRIPTORS: DESCRIPTORS: BURNING;SHOOTING

## 2018-03-21 ASSESSMENT — PAIN DESCRIPTION - FREQUENCY: FREQUENCY: CONTINUOUS

## 2018-03-21 ASSESSMENT — PAIN DESCRIPTION - PAIN TYPE: TYPE: CHRONIC PAIN

## 2018-03-21 ASSESSMENT — ACTIVITIES OF DAILY LIVING (ADL): EFFECT OF PAIN ON DAILY ACTIVITIES: PAIN INCREASES WITH WALKING AND STANDING

## 2018-03-21 ASSESSMENT — PAIN DESCRIPTION - DIRECTION: RADIATING_TOWARDS: RIGHT LEG

## 2018-03-21 ASSESSMENT — PAIN DESCRIPTION - PROGRESSION: CLINICAL_PROGRESSION: GRADUALLY WORSENING

## 2018-03-21 ASSESSMENT — PAIN SCALES - GENERAL: PAINLEVEL_OUTOF10: 5

## 2018-03-21 ASSESSMENT — PAIN DESCRIPTION - ONSET: ONSET: ON-GOING

## 2018-03-21 ASSESSMENT — PAIN DESCRIPTION - ORIENTATION: ORIENTATION: LOWER

## 2018-03-21 NOTE — PROGRESS NOTES
applicable  Recent ER visits: No                Past Medical History      Diagnosis Date    Allergic rhinitis, cause unspecified     Back pain     lumbar    Bowel obstruction     history of due to scar tissue, resolved non-surgically    CAD (coronary artery disease)     Cellulitis     left leg    Cellulitis 2017 August    leg left leg/bug bite    Diverticulosis of colon (without mention of hemorrhage)     GERD (gastroesophageal reflux disease)     History of MI (myocardial infarction) 2005    thought due to a blood clot    History of ovarian cyst 1970    had oopherectomy    History of peritonitis 1968    due to ruptured appendix age 13    HTN (hypertension)     Hx of blood clots     right leg    Hyperlipidemia     Intestinal or peritoneal adhesions with obstruction (postoperative) (postinfection)     Kidney infection     renal failure/sepsis/spider bite    Lateral epicondylitis  of elbow     Muscle strain     right posterior shoulder    Other abnormal glucose     Restless legs syndrome (RLS)     Vitamin D deficiency     Wears glasses        Surgical History  Past Surgical History:   Procedure Laterality Date    ABDOMEN SURGERY  1976    benign tumor removed near remaining overy, 1.5 pounds    APPENDECTOMY  1968    appendix ruptured, developed peritonitis    BUNIONECTOMY Left     along with calcium deposits removed   R Leopoldo 11  2005    negative    COLECTOMY  1969    12 INCHES REMOVED D/T OBSTRUCTION    COLONOSCOPY      CYST REMOVAL Right     right facial    HYSTERECTOMY  1973    taken as a result of recurring cysts    OTHER SURGICAL HISTORY  8/14/14    FESS    OVARY REMOVAL  1970    UNILATERAL due to cyst    OVARY REMOVAL  1971    partial, due to cyst    SINUS SURGERY  2004       Medications  Current Outpatient Prescriptions   Medication Sig Dispense Refill    meloxicam (MOBIC) 15 MG tablet Take 1 tablet by mouth daily 30 tablet 3    HYDROcodone-acetaminophen (NORCO) 5-325 MG per tablet Take 1 tablet by mouth every 12 hours as needed for Pain for up to 30 days. Earliest Fill Date: 3/10/18 60 tablet 0    furosemide (LASIX) 40 MG tablet Take 1 tablet by mouth daily 30 tablet 3    hydrALAZINE (APRESOLINE) 50 MG tablet Take 1 tablet by mouth every 8 hours 90 tablet 3    Cyanocobalamin (VITAMIN B 12 PO) Take by mouth      nitroGLYCERIN (NITROSTAT) 0.4 MG SL tablet Place 1 tablet under the tongue every 5 minutes as needed for Chest pain 75 tablet 3    vitamin D (CHOLECALCIFEROL) 1000 UNIT TABS tablet Take 1 tablet by mouth daily 90 tablet 3    fenofibrate micronized (LOFIBRA) 134 MG capsule Take 1 capsule by mouth every morning (before breakfast) 90 capsule 3    atorvastatin (LIPITOR) 80 MG tablet Take 1 tablet by mouth nightly 90 tablet 3    cyclobenzaprine (FLEXERIL) 10 MG tablet Take 1 tablet by mouth 3 times daily as needed for Muscle spasms 270 tablet 3    famotidine (PEPCID) 20 MG tablet Take 1 tablet by mouth 2 times daily 180 tablet 3    citalopram (CELEXA) 10 MG tablet Take 1 tablet by mouth daily 90 tablet 3    pramipexole (MIRAPEX) 1 MG tablet Take 1.5 tablets by mouth daily 90 tablet 3    lisinopril (PRINIVIL;ZESTRIL) 40 MG tablet Take 1 tablet by mouth daily 90 tablet 3    apixaban (ELIQUIS) 5 MG TABS tablet Take 1 tablet by mouth 2 times daily 180 tablet 3    aspirin 81 MG chewable tablet Take 81 mg by mouth daily      Calcium Carbonate-Vitamin D (CALCIUM 500/D) 500-125 MG-UNIT TABS Take 1 tablet by mouth 2 times daily        No current facility-administered medications for this encounter. Allergies  Bactrim [sulfamethoxazole-trimethoprim] and Codeine    Family History  family history includes Diabetes in her mother; Heart Disease in her brother, father, maternal grandmother, and mother; High Blood Pressure in her brother, mother, and sister; Stroke in her mother.     Social History  Social History     Social History thigh      Left Hip Exam     Muscle Strength   The patient has normal left hip strength. Back Exam     Tenderness   The patient is experiencing tenderness in the lumbar and sacroiliac. Range of Motion   Back extension: Limited and painful. Back flexion: Limited and painful. Back lateral bend right: Limited and painful. Back lateral bend left: Limited and painful. Back rotation right: Limited and painful. Back rotation left: Limited and painful. Tests   Straight leg raise right: positive  Straight leg raise left: negative    Other   Sensation: normal  Gait: antalgic   Erythema: no back redness  Scars: absent            Nurses Notes and Vital Signs reviewed. DATA  Labs:  Benzodiazepine Screen, Urine   Date Value Ref Range Status   08/05/2015 NEGATIVE NEG Final     Comment:           (Positive cutoff 200 ng/mL)                      Imaging:  Radiology Images and Reports reviewed where indicated and necessary  Disc desiccation all levels.  Slight heterogeneity of the marrow signal on T1-weighted imaging without evidence for marrow edema to suggest acute fractures.       L1-L2 level: Bilateral facet arthrosis with broad-based is bulge noted resulting in mild deformity and flattening of the ventral thecal sac with mild bilateral neuroforaminal narrowing       L2-L3 level: Mild facet arthrosis with ligamentum flavum thickening and broad-based is bulge without significant central canal stenosis or neuroforaminal narrowing.       L3-L4 level: Bilateral facet hypertrophy with ligamentum flavum thickening and broad-based is bulge with mild bilateral neuroforaminal narrowing without significant central canal stenosis.       L4-L5 level: Bilateral facet arthrosis with ligamentum flavum thickening and right-sided facet joint effusion and a broad-based disc bulge with mild edema in the right pedicle of L4 vertebral body likely relate with facet joint degenerative changes with    mild bilateral modification, heat/ice as needed, Urine drug screen as required. [x]The patient's questions were answered to the best of my abilities. This note was created using voice recognition software. There may be inaccuracies of transcription  that are inadvertently overlooked prior to the signature. There is any questions about the transcription please contact me. Return in  4 weeks  with  Washington Reining CNP  for further plan of treatment.            Electronically signed by Margarita Cormier MD on 3/21/2018 at 8:15 AM

## 2018-03-22 ENCOUNTER — TELEPHONE (OUTPATIENT)
Dept: INTERNAL MEDICINE CLINIC | Age: 68
End: 2018-03-22

## 2018-04-05 ENCOUNTER — OFFICE VISIT (OUTPATIENT)
Dept: INTERNAL MEDICINE CLINIC | Age: 68
End: 2018-04-05
Payer: MEDICARE

## 2018-04-05 VITALS
HEIGHT: 63 IN | HEART RATE: 60 BPM | WEIGHT: 194 LBS | DIASTOLIC BLOOD PRESSURE: 61 MMHG | BODY MASS INDEX: 34.38 KG/M2 | SYSTOLIC BLOOD PRESSURE: 135 MMHG

## 2018-04-05 DIAGNOSIS — M51.36 DDD (DEGENERATIVE DISC DISEASE), LUMBAR: ICD-10-CM

## 2018-04-05 DIAGNOSIS — R25.2 MUSCLE CRAMPS: ICD-10-CM

## 2018-04-05 DIAGNOSIS — I10 ESSENTIAL HYPERTENSION: Primary | ICD-10-CM

## 2018-04-05 DIAGNOSIS — N17.9 AKI (ACUTE KIDNEY INJURY) (HCC): ICD-10-CM

## 2018-04-05 DIAGNOSIS — I50.32 CHRONIC DIASTOLIC HEART FAILURE (HCC): ICD-10-CM

## 2018-04-05 DIAGNOSIS — I63.429 CEREBROVASCULAR ACCIDENT (CVA) DUE TO EMBOLISM OF ANTERIOR CEREBRAL ARTERY, UNSPECIFIED BLOOD VESSEL LATERALITY (HCC): ICD-10-CM

## 2018-04-05 PROCEDURE — 1090F PRES/ABSN URINE INCON ASSESS: CPT | Performed by: INTERNAL MEDICINE

## 2018-04-05 PROCEDURE — G8427 DOCREV CUR MEDS BY ELIG CLIN: HCPCS | Performed by: INTERNAL MEDICINE

## 2018-04-05 PROCEDURE — 1036F TOBACCO NON-USER: CPT | Performed by: INTERNAL MEDICINE

## 2018-04-05 PROCEDURE — 1123F ACP DISCUSS/DSCN MKR DOCD: CPT | Performed by: INTERNAL MEDICINE

## 2018-04-05 PROCEDURE — 4040F PNEUMOC VAC/ADMIN/RCVD: CPT | Performed by: INTERNAL MEDICINE

## 2018-04-05 PROCEDURE — G8598 ASA/ANTIPLAT THER USED: HCPCS | Performed by: INTERNAL MEDICINE

## 2018-04-05 PROCEDURE — 99214 OFFICE O/P EST MOD 30 MIN: CPT | Performed by: INTERNAL MEDICINE

## 2018-04-05 PROCEDURE — G8417 CALC BMI ABV UP PARAM F/U: HCPCS | Performed by: INTERNAL MEDICINE

## 2018-04-05 PROCEDURE — 3014F SCREEN MAMMO DOC REV: CPT | Performed by: INTERNAL MEDICINE

## 2018-04-05 PROCEDURE — 3017F COLORECTAL CA SCREEN DOC REV: CPT | Performed by: INTERNAL MEDICINE

## 2018-04-05 PROCEDURE — G8399 PT W/DXA RESULTS DOCUMENT: HCPCS | Performed by: INTERNAL MEDICINE

## 2018-04-05 RX ORDER — ATORVASTATIN CALCIUM 80 MG/1
80 TABLET, FILM COATED ORAL NIGHTLY
Qty: 90 TABLET | Refills: 3 | Status: SHIPPED | OUTPATIENT
Start: 2018-04-05 | End: 2018-10-22 | Stop reason: SDUPTHER

## 2018-04-05 RX ORDER — ATENOLOL 50 MG/1
50 TABLET ORAL DAILY
Qty: 90 TABLET | Refills: 3 | Status: SHIPPED | OUTPATIENT
Start: 2018-04-05 | End: 2018-08-21 | Stop reason: SDUPTHER

## 2018-04-05 RX ORDER — HYDRALAZINE HYDROCHLORIDE 50 MG/1
50 TABLET, FILM COATED ORAL EVERY 8 HOURS SCHEDULED
Qty: 90 TABLET | Refills: 3 | Status: SHIPPED | OUTPATIENT
Start: 2018-04-05 | End: 2018-08-21 | Stop reason: SDUPTHER

## 2018-04-05 RX ORDER — ATENOLOL 50 MG/1
50 TABLET ORAL DAILY
COMMUNITY
End: 2018-04-05 | Stop reason: SDUPTHER

## 2018-04-05 RX ORDER — FAMOTIDINE 20 MG/1
20 TABLET, FILM COATED ORAL 2 TIMES DAILY
Qty: 180 TABLET | Refills: 3 | Status: SHIPPED | OUTPATIENT
Start: 2018-04-05 | End: 2019-02-08 | Stop reason: SDUPTHER

## 2018-04-05 RX ORDER — FUROSEMIDE 20 MG/1
20 TABLET ORAL DAILY
Qty: 60 TABLET | Refills: 3 | Status: SHIPPED | OUTPATIENT
Start: 2018-04-05 | End: 2018-07-19 | Stop reason: SDUPTHER

## 2018-04-05 RX ORDER — FENOFIBRATE 134 MG/1
134 CAPSULE ORAL
Qty: 90 CAPSULE | Refills: 3 | Status: SHIPPED | OUTPATIENT
Start: 2018-04-05 | End: 2019-03-28 | Stop reason: SDUPTHER

## 2018-04-05 RX ORDER — PRAMIPEXOLE DIHYDROCHLORIDE 1 MG/1
1.5 TABLET ORAL DAILY
Qty: 145 TABLET | Refills: 3 | Status: SHIPPED | OUTPATIENT
Start: 2018-04-05 | End: 2018-08-21 | Stop reason: SDUPTHER

## 2018-04-05 ASSESSMENT — ENCOUNTER SYMPTOMS
CHEST TIGHTNESS: 0
SORE THROAT: 0
CONSTIPATION: 0
CHOKING: 0
VOMITING: 0
STRIDOR: 0
SHORTNESS OF BREATH: 1
EYE ITCHING: 0
WHEEZING: 0
BLOOD IN STOOL: 0
ABDOMINAL DISTENTION: 0
TROUBLE SWALLOWING: 0
RHINORRHEA: 0
EYE PAIN: 0
COLOR CHANGE: 0
COUGH: 0
RECTAL PAIN: 0
EYE REDNESS: 0
VOICE CHANGE: 0
APNEA: 0
FACIAL SWELLING: 0
ABDOMINAL PAIN: 0
ANAL BLEEDING: 0
DIARRHEA: 0
PHOTOPHOBIA: 0
EYE DISCHARGE: 0
NAUSEA: 0
SINUS PRESSURE: 0
BACK PAIN: 0

## 2018-04-13 ENCOUNTER — HOSPITAL ENCOUNTER (OUTPATIENT)
Age: 68
Setting detail: SPECIMEN
Discharge: HOME | End: 2018-04-13
Payer: MEDICARE

## 2018-04-13 DIAGNOSIS — N17.9 AKI (ACUTE KIDNEY INJURY) (HCC): ICD-10-CM

## 2018-04-13 LAB
ANION GAP SERPL CALCULATED.3IONS-SCNC: 13 MMOL/L (ref 9–17)
BUN BLDV-MCNC: 27 MG/DL (ref 8–23)
BUN/CREAT BLD: ABNORMAL (ref 9–20)
CALCIUM SERPL-MCNC: 8.8 MG/DL (ref 8.6–10.4)
CHLORIDE BLD-SCNC: 108 MMOL/L (ref 98–107)
CO2: 25 MMOL/L (ref 20–31)
CREAT SERPL-MCNC: 0.91 MG/DL (ref 0.5–0.9)
GFR AFRICAN AMERICAN: >60 ML/MIN
GFR NON-AFRICAN AMERICAN: >60 ML/MIN
GFR SERPL CREATININE-BSD FRML MDRD: ABNORMAL ML/MIN/{1.73_M2}
GFR SERPL CREATININE-BSD FRML MDRD: ABNORMAL ML/MIN/{1.73_M2}
GLUCOSE BLD-MCNC: 100 MG/DL (ref 70–99)
POTASSIUM SERPL-SCNC: 4.4 MMOL/L (ref 3.7–5.3)
SODIUM BLD-SCNC: 146 MMOL/L (ref 135–144)

## 2018-05-08 ENCOUNTER — HOSPITAL ENCOUNTER (OUTPATIENT)
Dept: PAIN MANAGEMENT | Age: 68
Discharge: HOME | End: 2018-05-08
Payer: MEDICARE

## 2018-05-08 VITALS — WEIGHT: 191 LBS | BODY MASS INDEX: 33.84 KG/M2 | TEMPERATURE: 98 F | HEIGHT: 63 IN | RESPIRATION RATE: 16 BRPM

## 2018-05-08 DIAGNOSIS — Z51.81 MEDICATION MONITORING ENCOUNTER: ICD-10-CM

## 2018-05-08 DIAGNOSIS — Z51.81 ENCOUNTER FOR MEDICATION MONITORING: ICD-10-CM

## 2018-05-08 DIAGNOSIS — M16.11 PRIMARY OSTEOARTHRITIS OF RIGHT HIP: ICD-10-CM

## 2018-05-08 DIAGNOSIS — G57.11 MERALGIA PARAESTHETICA, RIGHT: ICD-10-CM

## 2018-05-08 DIAGNOSIS — M51.36 LUMBAR DEGENERATIVE DISC DISEASE: ICD-10-CM

## 2018-05-08 DIAGNOSIS — M47.816 LUMBAR FACET ARTHROPATHY: ICD-10-CM

## 2018-05-08 DIAGNOSIS — M51.36 DDD (DEGENERATIVE DISC DISEASE), LUMBAR: Primary | ICD-10-CM

## 2018-05-08 DIAGNOSIS — M47.816 SPONDYLOSIS OF LUMBAR REGION WITHOUT MYELOPATHY OR RADICULOPATHY: ICD-10-CM

## 2018-05-08 DIAGNOSIS — M47.816 FACET ARTHRITIS OF LUMBAR REGION: ICD-10-CM

## 2018-05-08 PROCEDURE — 99213 OFFICE O/P EST LOW 20 MIN: CPT | Performed by: NURSE PRACTITIONER

## 2018-05-08 PROCEDURE — 80307 DRUG TEST PRSMV CHEM ANLYZR: CPT

## 2018-05-08 PROCEDURE — 99213 OFFICE O/P EST LOW 20 MIN: CPT

## 2018-05-08 RX ORDER — ACETAMINOPHEN 325 MG/1
650 TABLET ORAL EVERY 6 HOURS PRN
COMMUNITY
End: 2019-03-15 | Stop reason: ALTCHOICE

## 2018-05-08 RX ORDER — HYDROCODONE BITARTRATE AND ACETAMINOPHEN 5; 325 MG/1; MG/1
1 TABLET ORAL EVERY 12 HOURS PRN
Qty: 60 TABLET | Refills: 0 | Status: SHIPPED | OUTPATIENT
Start: 2018-05-09 | End: 2018-06-01 | Stop reason: SDUPTHER

## 2018-05-08 ASSESSMENT — ENCOUNTER SYMPTOMS
EYES NEGATIVE: 1
RESPIRATORY NEGATIVE: 1
BACK PAIN: 1
GASTROINTESTINAL NEGATIVE: 1

## 2018-05-12 LAB
6-ACETYLMORPHINE, UR: NOT DETECTED
7-AMINOCLONAZEPAM, URINE: NOT DETECTED
ALPHA-OH-ALPRAZ, URINE: NOT DETECTED
ALPRAZOLAM, URINE: NOT DETECTED
AMPHETAMINES, URINE: NOT DETECTED
BARBITURATES, URINE: NOT DETECTED
BENZOYLECGONINE, UR: NOT DETECTED
BUPRENORPHINE URINE: NOT DETECTED
CARISOPRODOL, UR: NOT DETECTED
CLONAZEPAM, URINE: NOT DETECTED
CODEINE, URINE: NOT DETECTED
CREATININE URINE: 126.4 MG/DL (ref 20–400)
DIAZEPAM, URINE: NOT DETECTED
DRUGS EXPECTED, UR: NORMAL
EER HI RES INTERP UR: NORMAL
ETHYL GLUCURONIDE UR: NOT DETECTED
FENTANYL URINE: NOT DETECTED
HYDROCODONE, URINE: NOT DETECTED
HYDROMORPHONE, URINE: NOT DETECTED
LORAZEPAM, URINE: NOT DETECTED
MARIJUANA METAB, UR: NOT DETECTED
MDA, UR: NOT DETECTED
MDEA, EVE, UR: NOT DETECTED
MDMA URINE: NOT DETECTED
MEPERIDINE METAB, UR: NOT DETECTED
METHADONE, URINE: NOT DETECTED
METHAMPHETAMINE, URINE: NOT DETECTED
METHYLPHENIDATE: NOT DETECTED
MIDAZOLAM, URINE: NOT DETECTED
MORPHINE URINE: NOT DETECTED
NORBUPRENORPHINE, URINE: NOT DETECTED
NORDIAZEPAM, URINE: NOT DETECTED
NORFENTANYL, URINE: NOT DETECTED
NORHYDROCODONE, URINE: NOT DETECTED
NOROXYCODONE, URINE: NOT DETECTED
NOROXYMORPHONE, URINE: NOT DETECTED
OXAZEPAM, URINE: NOT DETECTED
OXYCODONE URINE: NOT DETECTED
OXYMORPHONE, URINE: NOT DETECTED
PAIN MANAGEMENT DRUG PANEL INTERP, URINE: NORMAL
PAIN MGT DRUG PANEL, HI RES, UR: NORMAL
PCP,URINE: NOT DETECTED
PHENTERMINE, UR: NOT DETECTED
PROPOXYPHENE, URINE: NOT DETECTED
TAPENTADOL, URINE: NOT DETECTED
TAPENTADOL-O-SULFATE, URINE: NOT DETECTED
TEMAZEPAM, URINE: NOT DETECTED
TRAMADOL, URINE: NOT DETECTED
ZOLPIDEM, URINE: NOT DETECTED

## 2018-05-24 ENCOUNTER — OFFICE VISIT (OUTPATIENT)
Dept: INTERNAL MEDICINE CLINIC | Age: 68
End: 2018-05-24
Payer: MEDICARE

## 2018-05-24 VITALS
BODY MASS INDEX: 33.66 KG/M2 | SYSTOLIC BLOOD PRESSURE: 134 MMHG | HEIGHT: 63 IN | WEIGHT: 190 LBS | DIASTOLIC BLOOD PRESSURE: 65 MMHG

## 2018-05-24 DIAGNOSIS — I10 ESSENTIAL HYPERTENSION: ICD-10-CM

## 2018-05-24 DIAGNOSIS — I63.429 CEREBROVASCULAR ACCIDENT (CVA) DUE TO EMBOLISM OF ANTERIOR CEREBRAL ARTERY, UNSPECIFIED BLOOD VESSEL LATERALITY (HCC): ICD-10-CM

## 2018-05-24 DIAGNOSIS — I50.32 CHRONIC DIASTOLIC HEART FAILURE (HCC): ICD-10-CM

## 2018-05-24 DIAGNOSIS — M47.816 SPONDYLOSIS OF LUMBAR REGION WITHOUT MYELOPATHY OR RADICULOPATHY: ICD-10-CM

## 2018-05-24 DIAGNOSIS — N17.9 AKI (ACUTE KIDNEY INJURY) (HCC): ICD-10-CM

## 2018-05-24 DIAGNOSIS — L03.116 CELLULITIS OF LEFT LOWER EXTREMITY: Primary | ICD-10-CM

## 2018-05-24 DIAGNOSIS — I82.5Y3 CHRONIC DEEP VEIN THROMBOSIS (DVT) OF PROXIMAL VEIN OF BOTH LOWER EXTREMITIES (HCC): ICD-10-CM

## 2018-05-24 PROCEDURE — 1090F PRES/ABSN URINE INCON ASSESS: CPT | Performed by: INTERNAL MEDICINE

## 2018-05-24 PROCEDURE — 1123F ACP DISCUSS/DSCN MKR DOCD: CPT | Performed by: INTERNAL MEDICINE

## 2018-05-24 PROCEDURE — 1036F TOBACCO NON-USER: CPT | Performed by: INTERNAL MEDICINE

## 2018-05-24 PROCEDURE — G8598 ASA/ANTIPLAT THER USED: HCPCS | Performed by: INTERNAL MEDICINE

## 2018-05-24 PROCEDURE — 99214 OFFICE O/P EST MOD 30 MIN: CPT | Performed by: INTERNAL MEDICINE

## 2018-05-24 PROCEDURE — 4040F PNEUMOC VAC/ADMIN/RCVD: CPT | Performed by: INTERNAL MEDICINE

## 2018-05-24 PROCEDURE — G8417 CALC BMI ABV UP PARAM F/U: HCPCS | Performed by: INTERNAL MEDICINE

## 2018-05-24 PROCEDURE — G8399 PT W/DXA RESULTS DOCUMENT: HCPCS | Performed by: INTERNAL MEDICINE

## 2018-05-24 PROCEDURE — 3017F COLORECTAL CA SCREEN DOC REV: CPT | Performed by: INTERNAL MEDICINE

## 2018-05-24 PROCEDURE — G8427 DOCREV CUR MEDS BY ELIG CLIN: HCPCS | Performed by: INTERNAL MEDICINE

## 2018-05-24 RX ORDER — DOXYCYCLINE HYCLATE 100 MG/1
100 CAPSULE ORAL 2 TIMES DAILY
Qty: 20 CAPSULE | Refills: 0 | Status: SHIPPED | OUTPATIENT
Start: 2018-05-24 | End: 2018-06-03

## 2018-05-24 ASSESSMENT — ENCOUNTER SYMPTOMS
VOMITING: 0
RECTAL PAIN: 0
FACIAL SWELLING: 0
CHEST TIGHTNESS: 0
SHORTNESS OF BREATH: 1
APNEA: 0
ANAL BLEEDING: 0
EYE ITCHING: 0
NAUSEA: 0
EYE DISCHARGE: 0
COUGH: 0
CONSTIPATION: 0
VOICE CHANGE: 0
WHEEZING: 0
CHOKING: 0
PHOTOPHOBIA: 0
SINUS PRESSURE: 0
SORE THROAT: 0
STRIDOR: 0
EYE PAIN: 0
ABDOMINAL PAIN: 0
DIARRHEA: 0
ABDOMINAL DISTENTION: 0
TROUBLE SWALLOWING: 0
RHINORRHEA: 0
COLOR CHANGE: 1
BACK PAIN: 1
BLOOD IN STOOL: 0
EYE REDNESS: 0

## 2018-05-24 ASSESSMENT — PATIENT HEALTH QUESTIONNAIRE - PHQ9
2. FEELING DOWN, DEPRESSED OR HOPELESS: 1
SUM OF ALL RESPONSES TO PHQ9 QUESTIONS 1 & 2: 2
1. LITTLE INTEREST OR PLEASURE IN DOING THINGS: 1
SUM OF ALL RESPONSES TO PHQ QUESTIONS 1-9: 2

## 2018-06-01 ENCOUNTER — HOSPITAL ENCOUNTER (OUTPATIENT)
Dept: PAIN MANAGEMENT | Age: 68
Discharge: HOME | End: 2018-06-01
Payer: MEDICARE

## 2018-06-01 VITALS
DIASTOLIC BLOOD PRESSURE: 73 MMHG | HEIGHT: 63 IN | RESPIRATION RATE: 16 BRPM | BODY MASS INDEX: 33.66 KG/M2 | SYSTOLIC BLOOD PRESSURE: 151 MMHG | HEART RATE: 61 BPM | TEMPERATURE: 98.4 F | WEIGHT: 190 LBS

## 2018-06-01 DIAGNOSIS — M47.816 FACET ARTHRITIS OF LUMBAR REGION: ICD-10-CM

## 2018-06-01 DIAGNOSIS — G57.11 MERALGIA PARAESTHETICA, RIGHT: ICD-10-CM

## 2018-06-01 DIAGNOSIS — M47.816 SPONDYLOSIS OF LUMBAR REGION WITHOUT MYELOPATHY OR RADICULOPATHY: ICD-10-CM

## 2018-06-01 DIAGNOSIS — M51.36 DDD (DEGENERATIVE DISC DISEASE), LUMBAR: Primary | ICD-10-CM

## 2018-06-01 DIAGNOSIS — M16.11 PRIMARY OSTEOARTHRITIS OF RIGHT HIP: ICD-10-CM

## 2018-06-01 DIAGNOSIS — Z51.81 MEDICATION MONITORING ENCOUNTER: ICD-10-CM

## 2018-06-01 DIAGNOSIS — M51.36 LUMBAR DEGENERATIVE DISC DISEASE: ICD-10-CM

## 2018-06-01 DIAGNOSIS — Z51.81 ENCOUNTER FOR MEDICATION MONITORING: ICD-10-CM

## 2018-06-01 DIAGNOSIS — M47.816 LUMBAR FACET ARTHROPATHY: ICD-10-CM

## 2018-06-01 PROCEDURE — 99213 OFFICE O/P EST LOW 20 MIN: CPT | Performed by: NURSE PRACTITIONER

## 2018-06-01 PROCEDURE — 99213 OFFICE O/P EST LOW 20 MIN: CPT

## 2018-06-01 RX ORDER — HYDROCODONE BITARTRATE AND ACETAMINOPHEN 5; 325 MG/1; MG/1
1 TABLET ORAL EVERY 12 HOURS PRN
Qty: 60 TABLET | Refills: 0 | Status: SHIPPED | OUTPATIENT
Start: 2018-06-03 | End: 2018-07-06 | Stop reason: SDUPTHER

## 2018-06-01 RX ORDER — NUT.TX.GLUCOSE INTOLERANCE,SOY
LIQUID (ML) ORAL
COMMUNITY
End: 2018-07-05

## 2018-06-01 ASSESSMENT — ENCOUNTER SYMPTOMS
RESPIRATORY NEGATIVE: 1
EYES NEGATIVE: 1
BACK PAIN: 1
GASTROINTESTINAL NEGATIVE: 1

## 2018-07-05 ENCOUNTER — OFFICE VISIT (OUTPATIENT)
Dept: INTERNAL MEDICINE CLINIC | Age: 68
End: 2018-07-05
Payer: MEDICARE

## 2018-07-05 VITALS
WEIGHT: 187 LBS | HEART RATE: 56 BPM | SYSTOLIC BLOOD PRESSURE: 138 MMHG | HEIGHT: 63 IN | OXYGEN SATURATION: 98 % | BODY MASS INDEX: 33.13 KG/M2 | DIASTOLIC BLOOD PRESSURE: 74 MMHG

## 2018-07-05 DIAGNOSIS — M47.816 FACET ARTHRITIS OF LUMBAR REGION: Primary | ICD-10-CM

## 2018-07-05 DIAGNOSIS — I70.90 ATHEROSCLEROSIS: ICD-10-CM

## 2018-07-05 DIAGNOSIS — R27.0 ATAXIA: ICD-10-CM

## 2018-07-05 DIAGNOSIS — M48.062 NEUROGENIC CLAUDICATION DUE TO LUMBAR SPINAL STENOSIS: ICD-10-CM

## 2018-07-05 DIAGNOSIS — M16.10 HIP ARTHRITIS: ICD-10-CM

## 2018-07-05 DIAGNOSIS — F32.A DEPRESSION, UNSPECIFIED DEPRESSION TYPE: ICD-10-CM

## 2018-07-05 DIAGNOSIS — I73.9 CLAUDICATION OF BOTH LOWER EXTREMITIES (HCC): ICD-10-CM

## 2018-07-05 PROCEDURE — G8417 CALC BMI ABV UP PARAM F/U: HCPCS | Performed by: INTERNAL MEDICINE

## 2018-07-05 PROCEDURE — 1123F ACP DISCUSS/DSCN MKR DOCD: CPT | Performed by: INTERNAL MEDICINE

## 2018-07-05 PROCEDURE — G8427 DOCREV CUR MEDS BY ELIG CLIN: HCPCS | Performed by: INTERNAL MEDICINE

## 2018-07-05 PROCEDURE — 1090F PRES/ABSN URINE INCON ASSESS: CPT | Performed by: INTERNAL MEDICINE

## 2018-07-05 PROCEDURE — G8598 ASA/ANTIPLAT THER USED: HCPCS | Performed by: INTERNAL MEDICINE

## 2018-07-05 PROCEDURE — 3017F COLORECTAL CA SCREEN DOC REV: CPT | Performed by: INTERNAL MEDICINE

## 2018-07-05 PROCEDURE — G8399 PT W/DXA RESULTS DOCUMENT: HCPCS | Performed by: INTERNAL MEDICINE

## 2018-07-05 PROCEDURE — 4040F PNEUMOC VAC/ADMIN/RCVD: CPT | Performed by: INTERNAL MEDICINE

## 2018-07-05 PROCEDURE — 99214 OFFICE O/P EST MOD 30 MIN: CPT | Performed by: INTERNAL MEDICINE

## 2018-07-05 PROCEDURE — 1036F TOBACCO NON-USER: CPT | Performed by: INTERNAL MEDICINE

## 2018-07-05 RX ORDER — CITALOPRAM 10 MG/1
10 TABLET ORAL DAILY
Qty: 90 TABLET | Refills: 3 | Status: SHIPPED | OUTPATIENT
Start: 2018-07-05 | End: 2019-03-22 | Stop reason: SDUPTHER

## 2018-07-05 NOTE — PROGRESS NOTES
Visit Information    Have you changed or started any medications since your last visit including any over-the-counter medicines, vitamins, or herbal medicines? no   Are you having any side effects from any of your medications? -  no  Have you stopped taking any of your medications? Is so, why? -  no    Have you seen any other physician or provider since your last visit? No  Have you had any other diagnostic tests since your last visit? No  Have you been seen in the emergency room and/or had an admission to a hospital since we last saw you? No  Have you had your routine dental cleaning in the past 6 months? no    Have you activated your Jibo account? If not, what are your barriers?  Yes     Patient Care Team:  Ken De La Paz MD as PCP - Marino Schwartz MD as PCP - [facility] Attributed Provider    Medical History Review  Past Medical, Family, and Social History reviewed and does contribute to the patient presenting condition    Health Maintenance   Topic Date Due    DTaP/Tdap/Td vaccine (1 - Tdap) 01/18/1970    Shingles Vaccine (1 of 2 - 2 Dose Series) 01/18/2001    A1C test (Diabetic or Prediabetic)  07/18/2017    Flu vaccine (1) 09/01/2018    Potassium monitoring  04/13/2019    Creatinine monitoring  04/13/2019    Breast cancer screen  08/04/2019    Lipid screen  12/27/2022    Colon cancer screen colonoscopy  10/07/2026    DEXA (modify frequency per FRAX score)  Completed    Pneumococcal low/med risk  Completed    Hepatitis C screen  Completed     Chief Complaint   Patient presents with    Back Pain     has been seeing pain clinic not helping    Edema     had cellulitis still has bulge in calf and feet swelling     ,kll                                                                                                                                                                                                       OFFICE VISIT  PROGRESS NOTE further , walks 5 minutes before has to stop    XR HIP BILATERAL W AP PELVIS (2 VIEWS)     Standing Status:   Future     Standing Expiration Date:   7/5/2019     Order Specific Question:   Reason for exam:     Answer:   neurogenic claudication     Order Specific Question:   Reason for exam:     Answer:   intractable pain on ambulation with inability to walk further , walks 5 minutes before has to stop    XR LUMBAR SPINE (2-3 VIEWS)     Standing Status:   Future     Standing Expiration Date:   7/5/2019     Order Specific Question:   Reason for exam:     Answer:   intractable pain on ambulation with inability to walk further , walks 5 minutes before has to stop    67941 - [place] Non-Invas Physiologic STD Extremity ART 1-2 Level                     There are no Patient Instructions on file for this visit. Medication List          Accurate as of 7/5/18  9:26 PM. If you have any questions, ask your nurse or doctor.                CONTINUE taking these medications    acetaminophen 325 MG tablet  Commonly known as:  TYLENOL     apixaban 5 MG Tabs tablet  Commonly known as:  ELIQUIS  Take 1 tablet by mouth 2 times daily     aspirin 81 MG chewable tablet     atenolol 50 MG tablet  Commonly known as:  TENORMIN  Take 1 tablet by mouth daily     atorvastatin 80 MG tablet  Commonly known as:  LIPITOR  Take 1 tablet by mouth nightly     Calcium 500/D 500-125 MG-UNIT Tabs  Generic drug:  Calcium Carbonate-Vitamin D     citalopram 10 MG tablet  Commonly known as:  CELEXA  Take 1 tablet by mouth daily     cyclobenzaprine 10 MG tablet  Commonly known as:  FLEXERIL  Take 1 tablet by mouth 3 times daily as needed for Muscle spasms     famotidine 20 MG tablet  Commonly known as:  PEPCID  Take 1 tablet by mouth 2 times daily     fenofibrate micronized 134 MG capsule  Commonly known as:  LOFIBRA  Take 1 capsule by mouth every morning (before breakfast)     furosemide 20 MG tablet  Commonly known as:  LASIX  Take 1 tablet by mouth daily     hydrALAZINE 50 MG tablet  Commonly known as:  APRESOLINE  Take 1 tablet by mouth every 8 hours     lisinopril 40 MG tablet  Commonly known as:  PRINIVIL;ZESTRIL  Take 1 tablet by mouth daily     nitroGLYCERIN 0.4 MG SL tablet  Commonly known as:  NITROSTAT  Place 1 tablet under the tongue every 5 minutes as needed for Chest pain     pramipexole 1 MG tablet  Commonly known as:  MIRAPEX  Take 1.5 tablets by mouth daily     VITAMIN B 12 PO     vitamin D 1000 UNIT Tabs tablet  Commonly known as:  CHOLECALCIFEROL  Take 1 tablet by mouth daily        STOP taking these medications    DIABETIC TF Liqd  Stopped by:  Harsh Zelaya MD           Where to Get Your Medications      These medications were sent to [address] [place] [address] [phone]  [address] [place] [address] [address], [address] [place] [address],[address]    Phone:  [phone] ·   citalopram 10 MG tablet             FOLLOW UP  PLANS     Return in about 6 weeks (around 8/16/2018). MD GENA Del Rio [address], [address] [place] [address].    Phone [phone]   Fax: [phone]  Answering Service: [phone]

## 2018-07-06 ENCOUNTER — HOSPITAL ENCOUNTER (OUTPATIENT)
Dept: PAIN MANAGEMENT | Age: 68
Discharge: HOME | End: 2018-07-06
Payer: MEDICARE

## 2018-07-06 DIAGNOSIS — G57.11 MERALGIA PARAESTHETICA, RIGHT: ICD-10-CM

## 2018-07-06 DIAGNOSIS — M47.816 SPONDYLOSIS OF LUMBAR REGION WITHOUT MYELOPATHY OR RADICULOPATHY: ICD-10-CM

## 2018-07-06 DIAGNOSIS — M47.816 LUMBAR FACET ARTHROPATHY: ICD-10-CM

## 2018-07-06 DIAGNOSIS — M51.36 DDD (DEGENERATIVE DISC DISEASE), LUMBAR: Primary | ICD-10-CM

## 2018-07-06 DIAGNOSIS — Z51.81 ENCOUNTER FOR MEDICATION MONITORING: ICD-10-CM

## 2018-07-06 PROCEDURE — 99214 OFFICE O/P EST MOD 30 MIN: CPT

## 2018-07-06 PROCEDURE — 99213 OFFICE O/P EST LOW 20 MIN: CPT | Performed by: NURSE PRACTITIONER

## 2018-07-06 RX ORDER — HYDROCODONE BITARTRATE AND ACETAMINOPHEN 5; 325 MG/1; MG/1
1 TABLET ORAL EVERY 12 HOURS PRN
Qty: 60 TABLET | Refills: 0 | Status: SHIPPED | OUTPATIENT
Start: 2018-07-06 | End: 2018-08-02 | Stop reason: SDUPTHER

## 2018-07-06 ASSESSMENT — ENCOUNTER SYMPTOMS
COUGH: 0
BACK PAIN: 1
SHORTNESS OF BREATH: 0
CONSTIPATION: 0

## 2018-07-06 NOTE — PROGRESS NOTES
resolved non-surgically    CAD (coronary artery disease)     Cellulitis     left leg    Cellulitis 2017 August    leg left leg/bug bite    Diverticulosis of colon (without mention of hemorrhage)     GERD (gastroesophageal reflux disease)     History of MI (myocardial infarction) 2005    thought due to a blood clot    History of ovarian cyst 1970    had oopherectomy    History of peritonitis 1968    due to ruptured appendix age 13    HTN (hypertension)     Hx of blood clots     right leg    Hyperlipidemia     Intestinal or peritoneal adhesions with obstruction (postoperative) (postinfection)     Kidney infection     renal failure/sepsis/spider bite    Lateral epicondylitis  of elbow     Muscle strain     right posterior shoulder    Other abnormal glucose     Restless legs syndrome (RLS)     Vitamin D deficiency     Wears glasses        Past Surgical History:   Procedure Laterality Date    ABDOMEN SURGERY  1976    benign tumor removed near remaining overy, 1.5 pounds    APPENDECTOMY  1968    appendix ruptured, developed peritonitis    BUNIONECTOMY Left     along with calcium deposits removed   R Leopoldo 11  2005    negative    COLECTOMY  1969    12 INCHES REMOVED D/T OBSTRUCTION    COLONOSCOPY      CYST REMOVAL Right     right facial    HYSTERECTOMY  1973    taken as a result of recurring cysts    OTHER SURGICAL HISTORY  8/14/14    FESS    OVARY REMOVAL  1970    UNILATERAL due to cyst    OVARY REMOVAL  1971    partial, due to cyst    SINUS SURGERY  2004       Allergies   Allergen Reactions    Bactrim [Sulfamethoxazole-Trimethoprim] Other (See Comments)     sepsis    Codeine Itching         Current Outpatient Prescriptions:     citalopram (CELEXA) 10 MG tablet, Take 1 tablet by mouth daily, Disp: 90 tablet, Rfl: 3    apixaban (ELIQUIS) 5 MG TABS tablet, Take 1 tablet by mouth 2 times daily, Disp: 180 tablet, Rfl: 3    acetaminophen (TYLENOL) 325 MG tablet, Take 650 mg by mouth every 6 hours as needed for Pain, Disp: , Rfl:     fenofibrate micronized (LOFIBRA) 134 MG capsule, Take 1 capsule by mouth every morning (before breakfast), Disp: 90 capsule, Rfl: 3    atenolol (TENORMIN) 50 MG tablet, Take 1 tablet by mouth daily, Disp: 90 tablet, Rfl: 3    famotidine (PEPCID) 20 MG tablet, Take 1 tablet by mouth 2 times daily, Disp: 180 tablet, Rfl: 3    atorvastatin (LIPITOR) 80 MG tablet, Take 1 tablet by mouth nightly, Disp: 90 tablet, Rfl: 3    hydrALAZINE (APRESOLINE) 50 MG tablet, Take 1 tablet by mouth every 8 hours, Disp: 90 tablet, Rfl: 3    pramipexole (MIRAPEX) 1 MG tablet, Take 1.5 tablets by mouth daily, Disp: 145 tablet, Rfl: 3    furosemide (LASIX) 20 MG tablet, Take 1 tablet by mouth daily, Disp: 60 tablet, Rfl: 3    Cyanocobalamin (VITAMIN B 12 PO), Take by mouth, Disp: , Rfl:     nitroGLYCERIN (NITROSTAT) 0.4 MG SL tablet, Place 1 tablet under the tongue every 5 minutes as needed for Chest pain, Disp: 75 tablet, Rfl: 3    vitamin D (CHOLECALCIFEROL) 1000 UNIT TABS tablet, Take 1 tablet by mouth daily, Disp: 90 tablet, Rfl: 3    cyclobenzaprine (FLEXERIL) 10 MG tablet, Take 1 tablet by mouth 3 times daily as needed for Muscle spasms, Disp: 270 tablet, Rfl: 3    lisinopril (PRINIVIL;ZESTRIL) 40 MG tablet, Take 1 tablet by mouth daily, Disp: 90 tablet, Rfl: 3    aspirin 81 MG chewable tablet, Take 81 mg by mouth daily, Disp: , Rfl:     Calcium Carbonate-Vitamin D (CALCIUM 500/D) 500-125 MG-UNIT TABS, Take 1 tablet by mouth 2 times daily , Disp: , Rfl:     Family History   Problem Relation Age of Onset    Stroke Mother     Diabetes Mother     Heart Disease Mother     High Blood Pressure Mother     Heart Disease Father     Heart Disease Brother     High Blood Pressure Brother     Heart Disease Maternal Grandmother     High Blood Pressure Sister        Social History     Social History    Marital status:      Spouse possible due to medications and would like to continue them. As always, we encourage daily stretching and strengthening exercises, and recommend minimizing use of pain medications unless patient cannot get through daily activities due to pain. Discussed with the patient the effect the patients medical condition and opioid medication may have on the patients ability to safely operate a vehicle. Pt verbalized understanding. TREATMENT OPTIONS:   Contract requirements met. Continue opioid therapy.  Script written for norco  Encouraged to complete Lumbar MRI ordered by PCP  Follow up appointment made for 4 weeks

## 2018-07-11 ENCOUNTER — HOSPITAL ENCOUNTER (OUTPATIENT)
Facility: CLINIC | Age: 68
Discharge: HOME | End: 2018-07-13
Payer: MEDICARE

## 2018-07-11 ENCOUNTER — HOSPITAL ENCOUNTER (OUTPATIENT)
Dept: GENERAL RADIOLOGY | Facility: CLINIC | Age: 68
Discharge: HOME | End: 2018-07-13
Payer: MEDICARE

## 2018-07-11 DIAGNOSIS — M47.816 FACET ARTHRITIS OF LUMBAR REGION: ICD-10-CM

## 2018-07-11 DIAGNOSIS — M48.062 NEUROGENIC CLAUDICATION DUE TO LUMBAR SPINAL STENOSIS: ICD-10-CM

## 2018-07-11 PROCEDURE — 73521 X-RAY EXAM HIPS BI 2 VIEWS: CPT

## 2018-07-11 PROCEDURE — 72100 X-RAY EXAM L-S SPINE 2/3 VWS: CPT

## 2018-07-19 ENCOUNTER — OFFICE VISIT (OUTPATIENT)
Dept: INTERNAL MEDICINE CLINIC | Age: 68
End: 2018-07-19
Payer: MEDICARE

## 2018-07-19 VITALS
SYSTOLIC BLOOD PRESSURE: 136 MMHG | BODY MASS INDEX: 34.6 KG/M2 | WEIGHT: 188 LBS | HEIGHT: 62 IN | DIASTOLIC BLOOD PRESSURE: 88 MMHG

## 2018-07-19 DIAGNOSIS — I50.32 CHRONIC DIASTOLIC HEART FAILURE (HCC): Primary | ICD-10-CM

## 2018-07-19 DIAGNOSIS — M48.062 NEUROGENIC CLAUDICATION DUE TO LUMBAR SPINAL STENOSIS: Primary | ICD-10-CM

## 2018-07-19 PROCEDURE — 1090F PRES/ABSN URINE INCON ASSESS: CPT | Performed by: INTERNAL MEDICINE

## 2018-07-19 PROCEDURE — 1101F PT FALLS ASSESS-DOCD LE1/YR: CPT | Performed by: INTERNAL MEDICINE

## 2018-07-19 PROCEDURE — G8427 DOCREV CUR MEDS BY ELIG CLIN: HCPCS | Performed by: INTERNAL MEDICINE

## 2018-07-19 PROCEDURE — G8417 CALC BMI ABV UP PARAM F/U: HCPCS | Performed by: INTERNAL MEDICINE

## 2018-07-19 PROCEDURE — 1036F TOBACCO NON-USER: CPT | Performed by: INTERNAL MEDICINE

## 2018-07-19 PROCEDURE — 99213 OFFICE O/P EST LOW 20 MIN: CPT | Performed by: INTERNAL MEDICINE

## 2018-07-19 PROCEDURE — 1123F ACP DISCUSS/DSCN MKR DOCD: CPT | Performed by: INTERNAL MEDICINE

## 2018-07-19 PROCEDURE — 4040F PNEUMOC VAC/ADMIN/RCVD: CPT | Performed by: INTERNAL MEDICINE

## 2018-07-19 PROCEDURE — G8598 ASA/ANTIPLAT THER USED: HCPCS | Performed by: INTERNAL MEDICINE

## 2018-07-19 PROCEDURE — G8399 PT W/DXA RESULTS DOCUMENT: HCPCS | Performed by: INTERNAL MEDICINE

## 2018-07-19 PROCEDURE — 3017F COLORECTAL CA SCREEN DOC REV: CPT | Performed by: INTERNAL MEDICINE

## 2018-07-19 RX ORDER — FUROSEMIDE 40 MG/1
40 TABLET ORAL DAILY
Qty: 30 TABLET | Refills: 1 | Status: SHIPPED | OUTPATIENT
Start: 2018-07-19 | End: 2019-02-19 | Stop reason: SDUPTHER

## 2018-07-19 ASSESSMENT — PATIENT HEALTH QUESTIONNAIRE - PHQ9
SUM OF ALL RESPONSES TO PHQ QUESTIONS 1-9: 0
2. FEELING DOWN, DEPRESSED OR HOPELESS: 0
SUM OF ALL RESPONSES TO PHQ9 QUESTIONS 1 & 2: 0
1. LITTLE INTEREST OR PLEASURE IN DOING THINGS: 0

## 2018-07-19 NOTE — PROGRESS NOTES
Subjective:      Patient ID: Denny Yun is a 80 y.o. female. Visit Information    Have you changed or started any medications since your last visit including any over-the-counter medicines, vitamins, or herbal medicines? no   Are you having any side effects from any of your medications? -  no  Have you stopped taking any of your medications? Is so, why? -  no    Have you seen any other physician or provider since your last visit? No  Have you had any other diagnostic tests since your last visit? No  Have you been seen in the emergency room and/or had an admission to a hospital since we last saw you? No  Have you had your routine dental cleaning in the past 6 months? no    Have you activated your Symtext account? If not, what are your barriers? Yes     Patient Care Team:  Ken De La Paz MD as PCP - Marino Schwartz MD as PCP - S Attributed Provider    Medical History Review  Past Medical, Family, and Social History reviewed and does contribute to the patient presenting condition    Health Maintenance   Topic Date Due    DTaP/Tdap/Td vaccine (1 - Tdap) 01/18/1970    Shingles Vaccine (1 of 2 - 2 Dose Series) 01/18/2001    A1C test (Diabetic or Prediabetic)  07/18/2017    Flu vaccine (1) 09/01/2018    Potassium monitoring  04/13/2019    Creatinine monitoring  04/13/2019    Breast cancer screen  08/04/2019    Lipid screen  12/27/2022    Colon cancer screen colonoscopy  10/07/2026    DEXA (modify frequency per FRAX score)  Completed    Pneumococcal low/med risk  Completed    Hepatitis C screen  Completed       HPI   Chief Complaint   Patient presents with    Leg Swelling     Left lower leg and bulge on the inside of calf. Swelling started about 1 year ago but has gotten worse over the past week. Also c/o blisters on the back of left calf.      Pt with hx of HTN on atenolol hydralazine   Echo from 0/47 shows diastolic dysfunction   Now with worsening pedal edema   No sob or chest pain

## 2018-07-23 ENCOUNTER — HOSPITAL ENCOUNTER (OUTPATIENT)
Dept: MRI IMAGING | Facility: CLINIC | Age: 68
Discharge: HOME | End: 2018-07-25
Payer: MEDICARE

## 2018-07-23 DIAGNOSIS — M48.062 NEUROGENIC CLAUDICATION DUE TO LUMBAR SPINAL STENOSIS: ICD-10-CM

## 2018-07-23 DIAGNOSIS — M47.816 FACET ARTHRITIS OF LUMBAR REGION: ICD-10-CM

## 2018-07-23 PROCEDURE — 72148 MRI LUMBAR SPINE W/O DYE: CPT

## 2018-08-02 ENCOUNTER — HOSPITAL ENCOUNTER (OUTPATIENT)
Dept: PAIN MANAGEMENT | Age: 68
Discharge: HOME | End: 2018-08-02
Payer: MEDICARE

## 2018-08-02 VITALS
OXYGEN SATURATION: 98 % | TEMPERATURE: 98.4 F | WEIGHT: 188 LBS | DIASTOLIC BLOOD PRESSURE: 80 MMHG | HEIGHT: 63 IN | RESPIRATION RATE: 16 BRPM | BODY MASS INDEX: 33.31 KG/M2 | HEART RATE: 61 BPM | SYSTOLIC BLOOD PRESSURE: 142 MMHG

## 2018-08-02 DIAGNOSIS — M16.11 PRIMARY OSTEOARTHRITIS OF RIGHT HIP: ICD-10-CM

## 2018-08-02 DIAGNOSIS — M48.062 NEUROGENIC CLAUDICATION DUE TO LUMBAR SPINAL STENOSIS: ICD-10-CM

## 2018-08-02 DIAGNOSIS — Z51.81 ENCOUNTER FOR MEDICATION MONITORING: ICD-10-CM

## 2018-08-02 DIAGNOSIS — M51.36 DDD (DEGENERATIVE DISC DISEASE), LUMBAR: Primary | ICD-10-CM

## 2018-08-02 DIAGNOSIS — G57.10 MERALGIA PARESTHETICA, UNSPECIFIED LATERALITY: ICD-10-CM

## 2018-08-02 DIAGNOSIS — M47.816 LUMBAR FACET ARTHROPATHY: ICD-10-CM

## 2018-08-02 DIAGNOSIS — M47.816 SPONDYLOSIS OF LUMBAR REGION WITHOUT MYELOPATHY OR RADICULOPATHY: ICD-10-CM

## 2018-08-02 DIAGNOSIS — G57.11 MERALGIA PARAESTHETICA, RIGHT: ICD-10-CM

## 2018-08-02 DIAGNOSIS — M51.36 LUMBAR DEGENERATIVE DISC DISEASE: ICD-10-CM

## 2018-08-02 DIAGNOSIS — Z51.81 MEDICATION MONITORING ENCOUNTER: ICD-10-CM

## 2018-08-02 DIAGNOSIS — M47.816 FACET ARTHRITIS OF LUMBAR REGION: ICD-10-CM

## 2018-08-02 PROCEDURE — 99213 OFFICE O/P EST LOW 20 MIN: CPT | Performed by: NURSE PRACTITIONER

## 2018-08-02 PROCEDURE — 99213 OFFICE O/P EST LOW 20 MIN: CPT

## 2018-08-02 RX ORDER — HYDROCODONE BITARTRATE AND ACETAMINOPHEN 5; 325 MG/1; MG/1
1 TABLET ORAL EVERY 8 HOURS PRN
Qty: 90 TABLET | Refills: 0 | Status: SHIPPED | OUTPATIENT
Start: 2018-08-05 | End: 2018-08-31 | Stop reason: SDUPTHER

## 2018-08-02 ASSESSMENT — ENCOUNTER SYMPTOMS
BACK PAIN: 1
EYES NEGATIVE: 1
RESPIRATORY NEGATIVE: 1
GASTROINTESTINAL NEGATIVE: 1

## 2018-08-02 NOTE — PROGRESS NOTES
Sha 89 PROGRESS NOTE      Patient here today to review MEDICATION CONTRACT    Chief Complaint:  Low back pain    HPI: She c/o low back pain which has increased. No history of lumbar surgery. PT and injections did not help her. She has not seen a surgeon. She had hip x-rays  Of hips and MRI lumbar spine. She had injection of her hips 4-5 months ago which only helped for a short time. She is not sleeping well, she wakes up due to pain. She has had no ED visits. Back Pain   This is a chronic problem. The problem occurs intermittently. The pain is present in the lumbar spine. The quality of the pain is described as aching. Radiates to: hips. The pain is at a severity of 8/10 (0 when sitting 8 when up). The pain is severe. The pain is the same all the time. Exacerbated by: walking. Associated symptoms include numbness. (Right thigh ) Risk factors include menopause, obesity and sedentary lifestyle. She has tried analgesics, ice and muscle relaxant for the symptoms. The treatment provided mild relief. Patient denies any new neurological symptoms. No bowel or bladder incontinence, no weakness, and no falling. Treatment goals: to do normal activiities  Functional status:       Aberrancy  none  Analgesia   Pain 8  Adverse  Effects : BM daily  ADL;s :housework, paces self,  Not exercising        Pill count:  NOT appropriate short 5 hydrocodone  Morphine equivalent dose as reported on [place]:10  Attestation: The Prescription Monitoring Report for this patient was reviewed today. MAIK Mckeon CNP)  Documentation: No signs of potential drug abuse or diversion identified. MAIK Mckeon CNP)  Medication Contracts: Existing medication contract. MAIK Mckeon CNP)  Review of [place] does not show any aberrant prescription behavior. Medication is helping the patient stay active. Patient denies any side effects and reports adequate analgesia.  No sign of misuse/abuse. When was the last UDS:  5-8-18           Was the UDS appropriate:yes      Record/Diagnostics Review:      As above, I did review the imaging    5/12/2018 10:33 PM - Maria Guadalupe Wolf Incoming Lab Results From [facility]     Component Results     Component Value Ref Range & Units Status Collected Lab   Pain Management Drug Panel Interp, Urine Inconsistent   Final 05/08/2018  9:30 AM [facility]   (NOTE)   ________________________________________________________________   DRUGS EXPECTED:   HYDROCODONE [5/7/18]   ________________________________________________________________   INCONSISTENT with medications provided:   HYDROCODONE : based on the absence of hydrocodone and metabolites   ________________________________________________________________   INTERPRETIVE INFORMATION: Pain Mgt [place], [place] Spec/EMIT, Ur,                            Interp   Interpretation depends on accuracy and completeness of patient   medication information submitted by client. 6-Acetylmorphine, Ur Not Detected   Final 05/08/2018  9:30 AM [facility]   7-Aminoclonazepam, Urine Not Detected   Final 05/08/2018  9:30 AM [facility]   Alpha-OH-Alpraz, Urine Not Detected   Final 05/08/2018  9:30 AM [facility]   Alprazolam, Urine Not Detected   Final 05/08/2018  9:30 AM [facility]   Amphetamines, urine Not Detected   Final 05/08/2018  9:30 AM [facility]   Barbiturates, Ur Not Detected   Final 05/08/2018  9:30 AM [facility]   Benzoylecgonine, Ur Not Detected   Final 05/08/2018  9:30 AM [facility]   Buprenorphine Urine Not Detected   Final 05/08/2018  9:30 AM [facility]   Carisoprodol, Ur Not Detected   Final 05/08/2018  9:30 AM [facility]   (NOTE)   The carisoprodol immunoassay has cross-reactivity to carisoprodol   and meprobamate.     Clonazepam, Urine Not Detected   Final 05/08/2018  9:30 AM [facility]   Codeine, Urine Not Detected   Final 05/08/2018  9:30 AM [facility]   MDA, Ur Not Detected   Final 05/08/2018  9:30 AM [facility]   Diazepam, Urine Not Detected   Final 05/08/2018  9:30 AM [facility]   Ethyl Glucuronide Ur Not Detected   Final 05/08/2018  9:30 AM [facility]   Fentanyl, Ur Not Detected   Final 05/08/2018  9:30 AM [facility]   Hydrocodone, Urine Not Detected   Final 05/08/2018  9:30 AM [facility]   Hydromorphone, Urine Not Detected   Final 05/08/2018  9:30 AM [facility]   Lorazepam, Urine Not Detected   Final 05/08/2018  9:30 AM [facility]   Marijuana Metab, Ur Not Detected   Final 05/08/2018  9:30 AM [facility]   MDEA, RADHA, Ur Not Detected   Final 05/08/2018  9:30 AM [facility]   MDMA, Urine Not Detected   Final 05/08/2018  9:30 AM [facility]   Meperidine Metab, Ur Not Detected   Final 05/08/2018  9:30 AM [facility]   Methadone, Urine Not Detected   Final 05/08/2018  9:30 AM [facility]   Methamphetamine, Urine Not Detected   Final 05/08/2018  9:30 AM [facility]   Methylphenidate Not Detected   Final 05/08/2018  9:30 AM [facility]   Midazolam, Urine Not Detected   Final 05/08/2018  9:30 AM [facility]   Morphine Urine Not Detected   Final 05/08/2018  9:30 AM [facility]   Norbuprenorphine, Urine Not Detected   Final 05/08/2018  9:30 AM [facility]   Nordiazepam, Urine Not Detected   Final 05/08/2018  9:30 AM [facility]   Norfentanyl, Urine Not Detected   Final 05/08/2018  9:30 AM [facility]   NORHYDROCODONE, URINE Not Detected   Final 05/08/2018  9:30 AM [facility]   Noroxycodone, Urine Not Detected   Final 05/08/2018  9:30 AM [facility]   NOROXYMORPHONE, URINE Not Detected   Final 05/08/2018  9:30 AM [facility]   Oxazepam, Urine Not Detected   Final 05/08/2018  9:30 AM [facility]   Oxycodone Urine Not Detected   Final 05/08/2018  9:30 AM [facility]   Oxymorphone, Urine Not Detected   Final 05/08/2018  9:30 AM [facility]   PCP, Urine Not Detected   Final 05/08/2018  9:30 AM [facility]   Phentermine, Ur Not Detected   Final 05/08/2018  9:30 AM [facility]   Propoxyphene, Urine Not Detected   Final 05/08/2018  9:30 AM [facility]   Tapentadol-O-Sulfate, Urine Not Detected   Final 05/08/2018  9:30 AM [facility]   Tapentadol, Urine Not Detected   Final 05/08/2018  9:30 AM [facility]   Temazepam, Urine Not Detected   Final 05/08/2018  9:30 AM [facility]   Tramadol, Urine Not Detected   Final 05/08/2018  9:30 AM [facility]   Zolpidem, Urine Not Detected   Final 05/08/2018  9:30 AM [facility]   Drugs Expected, Ur   Final 05/08/2018  9:30 AM - [facility]   LAST DOSE HYDROCODINE 5/7/18 4AM    Creatinine, Ur 126.4  20.0 - 400.0 mg/dL Final 05/08/2018  9:30 AM [facility]   Pain Mgt Drug Panel, Hi Res, Ur See Below   Final 05/08/2018  9:30 AM [facility]   (NOTE)   Methodology: Qualitative Enzyme Immunoassay and Qualitative Liquid   Chromatography-Time of Flight-Mass Spectrometry or Tandem Mass   Spectrometry, Quantitative Spectrophotometry   The absence of expected drug(s) and/or drug metabolite(s) may   indicate non-compliance, inappropriate timing of specimen   collection relative to drug administration, poor drug absorption,   diluted/adulterated urine, or limitations of testing. The   concentration must be greater than or equal to the cutoff to be   reported as present.  If specific drug concentrations are   required, contact the laboratory within two weeks of specimen   collection to request quantification by a second analytical   technique. Interpretive questions should be directed to the   laboratory. Results based on immunoassay detection that do not match clinical   expectations should be   interpreted with caution. Confirmatory testing by mass   spectrometry for immunoassay-based results is available, if   ordered within two weeks of specimen collection. Additional   charges apply. For medical purposes only; not valid for forensic use. This test was developed and its performance characteristics   determined by [facility]. The U.S. Food and Drug   Administration has not approved or cleared this test; however, FDA   clearance or approval is not currently required for clinical use. The results are not intended to be used as the sole means for   clinical diagnosis or patient management decisions.     EER Hi Res Interp Ur See Note   Final 05/08/2018  9:30 AM [facility]   (NOTE)   Access [facility] Enhanced as a result of recurring cysts    OTHER SURGICAL HISTORY  8/14/14    FESS    OVARY REMOVAL  1970    UNILATERAL due to cyst    OVARY REMOVAL  1971    partial, due to cyst    SINUS SURGERY  2004       Allergies   Allergen Reactions    Bactrim [Sulfamethoxazole-Trimethoprim] Other (See Comments)     sepsis    Codeine Itching         Current Outpatient Prescriptions:     [START ON 8/5/2018] HYDROcodone-acetaminophen (NORCO) 5-325 MG per tablet, Take 1 tablet by mouth every 8 hours as needed for Pain for up to 30 days. ., Disp: 90 tablet, Rfl: 0    furosemide (LASIX) 40 MG tablet, Take 1 tablet by mouth daily, Disp: 30 tablet, Rfl: 1    citalopram (CELEXA) 10 MG tablet, Take 1 tablet by mouth daily, Disp: 90 tablet, Rfl: 3    apixaban (ELIQUIS) 5 MG TABS tablet, Take 1 tablet by mouth 2 times daily, Disp: 180 tablet, Rfl: 3    fenofibrate micronized (LOFIBRA) 134 MG capsule, Take 1 capsule by mouth every morning (before breakfast), Disp: 90 capsule, Rfl: 3    atenolol (TENORMIN) 50 MG tablet, Take 1 tablet by mouth daily, Disp: 90 tablet, Rfl: 3    famotidine (PEPCID) 20 MG tablet, Take 1 tablet by mouth 2 times daily, Disp: 180 tablet, Rfl: 3    atorvastatin (LIPITOR) 80 MG tablet, Take 1 tablet by mouth nightly, Disp: 90 tablet, Rfl: 3    hydrALAZINE (APRESOLINE) 50 MG tablet, Take 1 tablet by mouth every 8 hours, Disp: 90 tablet, Rfl: 3    pramipexole (MIRAPEX) 1 MG tablet, Take 1.5 tablets by mouth daily, Disp: 145 tablet, Rfl: 3    Cyanocobalamin (VITAMIN B 12 PO), Take by mouth, Disp: , Rfl:     vitamin D (CHOLECALCIFEROL) 1000 UNIT TABS tablet, Take 1 tablet by mouth daily, Disp: 90 tablet, Rfl: 3    lisinopril (PRINIVIL;ZESTRIL) 40 MG tablet, Take 1 tablet by mouth daily, Disp: 90 tablet, Rfl: 3    aspirin 81 MG chewable tablet, Take 81 mg by mouth daily, Disp: , Rfl:     Calcium Carbonate-Vitamin D (CALCIUM 500/D) 500-125 MG-UNIT TABS, Take 1 tablet by mouth 2 times daily , Disp: , Rfl:    acetaminophen (TYLENOL) 325 MG tablet, Take 650 mg by mouth every 6 hours as needed for Pain, Disp: , Rfl:     nitroGLYCERIN (NITROSTAT) 0.4 MG SL tablet, Place 1 tablet under the tongue every 5 minutes as needed for Chest pain, Disp: 75 tablet, Rfl: 3    cyclobenzaprine (FLEXERIL) 10 MG tablet, Take 1 tablet by mouth 3 times daily as needed for Muscle spasms, Disp: 270 tablet, Rfl: 3    Family History   Problem Relation Age of Onset    Stroke Mother     Diabetes Mother     Heart Disease Mother     High Blood Pressure Mother     Heart Disease Father     Heart Disease Brother     High Blood Pressure Brother     Heart Disease Maternal Grandmother     High Blood Pressure Sister        Social History     Social History    Marital status:      Spouse name: N/A    Number of children: N/A    Years of education: N/A     Occupational History    retired      Social History Main Topics    Smoking status: Former Smoker     Packs/day: 0.50     Years: 20.00     Types: Cigarettes     Start date: 8/11/1995     Quit date: 6/20/2017    Smokeless tobacco: Never Used      Comment: 6-20-17 quit 10 days ago    Alcohol use 0.0 oz/week      Comment: occasional    Drug use: No    Sexual activity: Not on file     Other Topics Concern    Not on file     Social History Narrative    No narrative on file       Review of Systems:  Review of Systems   Constitution: Negative. HENT: Negative. Eyes: Negative. Cardiovascular: Positive for dyspnea on exertion. Respiratory: Negative. Hematologic/Lymphatic: Bruises/bleeds easily. Skin: Negative. Musculoskeletal: Positive for back pain, falls and joint pain. Gastrointestinal: Negative. Genitourinary: Negative. Neurological: Positive for loss of balance and numbness. Psychiatric/Behavioral: Positive for depression.         Managing         Physical Exam:  Pulse 61   Temp 98.4 °F (36.9 °C) (Oral)   Resp 16   Ht 5' 3\" (1.6 m)   Wt 188 lb (85.3 kg) vertebral body heights are maintained. The bone marrow   signal appears unremarkable.       SPINAL CORD: The conus terminates normally.       SOFT TISSUES: No paraspinal mass identified.  Dependent edema in the low back   overlies the sacrum and lower lumbar spine.  No localized fluid collection is   identified.       T12-L1:  Focal left paracentral disc protrusion effacing the thecal sac, but   not contacting the cord or descending neural elements.  No significant spinal   canal or neural foraminal narrowing.       L1-L2: Circumferential disc bulge with bilateral facet hypertrophy.  There is   no significant spinal canal or neural foraminal narrowing.       L2-L3: Circumferential disc bulge with mild facet and ligamentum flavum   hypertrophy.  There is mild bilateral neural foraminal narrowing right   greater than left.       L3-L4: Circumferential disc bulge with facet and ligamentum flavum   hypertrophy.  There is no significant spinal canal narrowing.  There is mild   bilateral neural foraminal narrowing.       L4-L5: Moderate-sized circumferential disc bulge eccentric to the left with   bilateral facet and ligament flavum hypertrophy.  There is mild spinal canal   narrowing.  There is moderate bilateral neural foraminal narrowing.       L5-S1: Focal left subarticular disc bulge with superimposed circumferential   disc bulge.  There is mild facet and ligamentum flavum hypertrophy.  Mild   spinal canal narrowing.  There is minimal bilateral neural foraminal   narrowing.       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           Impression   1. No areas of critical spinal canal or neural foraminal narrowing.    2. Multilevel degenerative disc disease most significant at L4-L5 where there   is moderate bilateral neural foraminal narrowing related to facet and   ligamentum flavum hypertrophy with circumferential disc bulge.           Assessment:    Problem List Items Addressed This Visit Degenerative joint disease (DJD) of hip    Relevant Medications    HYDROcodone-acetaminophen (NORCO) 5-325 MG per tablet (Start on 8/5/2018)    Facet arthritis of lumbar region [facility])    Relevant Medications    HYDROcodone-acetaminophen (NORCO) 5-325 MG per tablet (Start on 8/5/2018)    Meralgia paresthetica    Lumbar degenerative disc disease - Primary    Relevant Medications    HYDROcodone-acetaminophen (NORCO) 5-325 MG per tablet (Start on 8/5/2018)    Spondylosis of lumbar region without myelopathy or radiculopathy    Relevant Medications    HYDROcodone-acetaminophen (NORCO) 5-325 MG per tablet (Start on 8/5/2018)    Encounter for medication monitoring    Lumbar facet arthropathy    Relevant Medications    HYDROcodone-acetaminophen (Navarro Medley) 5-325 MG per tablet (Start on 8/5/2018)    Neurogenic claudication due to lumbar spinal stenosis      Other Visit Diagnoses     Medication monitoring encounter              Treatment Plan:  DISCUSSION: Treatment options discussed with patient and all questions answered to patient's satisfaction. Possible side effects, risk of tolerance and or dependence and alternative treatments discussed    Obtaining appropriate analgesic effect of treatment   No signs of potential drug abuse or diversion identified    [x] Ill effects of being on chronic pain medications such as sleep disturbances, respiratory depression, hormonal changes, withdrawal symptoms, chronic opioid dependence and tolerance as well as risk of taking opioids with Benzodiazepines and taking opioids along with alcohol,  were discussed with patient. I had asked the patient to minimize medication use and utilize pain medications only for uncontrolled rest pain or pain with exertional activities. I advised patient not to self-escalate pain medications without consulting with us.   At each of patient's future visits we will try to taper pain medications, while adjusting the adjunct medications, and re-evaluating for

## 2018-08-21 ENCOUNTER — OFFICE VISIT (OUTPATIENT)
Dept: INTERNAL MEDICINE CLINIC | Age: 68
End: 2018-08-21
Payer: MEDICARE

## 2018-08-21 VITALS
BODY MASS INDEX: 32.96 KG/M2 | SYSTOLIC BLOOD PRESSURE: 142 MMHG | WEIGHT: 186 LBS | HEART RATE: 52 BPM | HEIGHT: 63 IN | DIASTOLIC BLOOD PRESSURE: 64 MMHG

## 2018-08-21 DIAGNOSIS — M47.27 LUMBOSACRAL SPONDYLOSIS WITH RADICULOPATHY: ICD-10-CM

## 2018-08-21 DIAGNOSIS — I82.5Y3 CHRONIC DEEP VEIN THROMBOSIS (DVT) OF PROXIMAL VEIN OF BOTH LOWER EXTREMITIES (HCC): ICD-10-CM

## 2018-08-21 DIAGNOSIS — I10 ESSENTIAL HYPERTENSION: ICD-10-CM

## 2018-08-21 DIAGNOSIS — R20.2 PARESTHESIA AND PAIN OF LEFT EXTREMITY: ICD-10-CM

## 2018-08-21 DIAGNOSIS — R60.0 BILATERAL LEG EDEMA: ICD-10-CM

## 2018-08-21 DIAGNOSIS — M79.609 PARESTHESIA AND PAIN OF LEFT EXTREMITY: ICD-10-CM

## 2018-08-21 DIAGNOSIS — G25.81 RESTLESS LEGS SYNDROME (RLS): ICD-10-CM

## 2018-08-21 DIAGNOSIS — R73.03 PREDIABETES: ICD-10-CM

## 2018-08-21 DIAGNOSIS — I50.32 CHRONIC DIASTOLIC HEART FAILURE (HCC): ICD-10-CM

## 2018-08-21 DIAGNOSIS — Z23 NEED FOR PROPHYLACTIC VACCINATION WITH TETANUS-DIPHTHERIA (TD): ICD-10-CM

## 2018-08-21 DIAGNOSIS — I87.2 VENOUS INSUFFICIENCY (CHRONIC) (PERIPHERAL): Primary | ICD-10-CM

## 2018-08-21 DIAGNOSIS — Z23 NEED FOR PROPHYLACTIC VACCINATION AND INOCULATION AGAINST VARICELLA: ICD-10-CM

## 2018-08-21 LAB — HBA1C MFR BLD: 6.3 %

## 2018-08-21 PROCEDURE — 1123F ACP DISCUSS/DSCN MKR DOCD: CPT | Performed by: INTERNAL MEDICINE

## 2018-08-21 PROCEDURE — 83036 HEMOGLOBIN GLYCOSYLATED A1C: CPT | Performed by: INTERNAL MEDICINE

## 2018-08-21 PROCEDURE — 1036F TOBACCO NON-USER: CPT | Performed by: INTERNAL MEDICINE

## 2018-08-21 PROCEDURE — G8399 PT W/DXA RESULTS DOCUMENT: HCPCS | Performed by: INTERNAL MEDICINE

## 2018-08-21 PROCEDURE — 3017F COLORECTAL CA SCREEN DOC REV: CPT | Performed by: INTERNAL MEDICINE

## 2018-08-21 PROCEDURE — G8598 ASA/ANTIPLAT THER USED: HCPCS | Performed by: INTERNAL MEDICINE

## 2018-08-21 PROCEDURE — 1101F PT FALLS ASSESS-DOCD LE1/YR: CPT | Performed by: INTERNAL MEDICINE

## 2018-08-21 PROCEDURE — G8427 DOCREV CUR MEDS BY ELIG CLIN: HCPCS | Performed by: INTERNAL MEDICINE

## 2018-08-21 PROCEDURE — 1090F PRES/ABSN URINE INCON ASSESS: CPT | Performed by: INTERNAL MEDICINE

## 2018-08-21 PROCEDURE — 99214 OFFICE O/P EST MOD 30 MIN: CPT | Performed by: INTERNAL MEDICINE

## 2018-08-21 PROCEDURE — G8417 CALC BMI ABV UP PARAM F/U: HCPCS | Performed by: INTERNAL MEDICINE

## 2018-08-21 PROCEDURE — 4040F PNEUMOC VAC/ADMIN/RCVD: CPT | Performed by: INTERNAL MEDICINE

## 2018-08-21 RX ORDER — PRAMIPEXOLE DIHYDROCHLORIDE 1 MG/1
1.5 TABLET ORAL DAILY
Qty: 145 TABLET | Refills: 3 | Status: SHIPPED | OUTPATIENT
Start: 2018-08-21 | End: 2018-12-11 | Stop reason: SDUPTHER

## 2018-08-21 RX ORDER — ATENOLOL 50 MG/1
50 TABLET ORAL DAILY
Qty: 90 TABLET | Refills: 3 | Status: SHIPPED | OUTPATIENT
Start: 2018-08-21 | End: 2018-10-22

## 2018-08-21 RX ORDER — TETANUS AND DIPHTHERIA TOXOIDS ADSORBED 2; 2 [LF]/.5ML; [LF]/.5ML
0.5 INJECTION INTRAMUSCULAR ONCE
Qty: 0.5 ML | Refills: 0 | Status: SHIPPED | OUTPATIENT
Start: 2018-08-21 | End: 2018-08-21

## 2018-08-21 RX ORDER — HYDRALAZINE HYDROCHLORIDE 50 MG/1
50 TABLET, FILM COATED ORAL EVERY 8 HOURS SCHEDULED
Qty: 90 TABLET | Refills: 3 | Status: SHIPPED | OUTPATIENT
Start: 2018-08-21 | End: 2018-10-22 | Stop reason: SDUPTHER

## 2018-08-21 RX ORDER — LISINOPRIL 40 MG/1
40 TABLET ORAL DAILY
Qty: 90 TABLET | Refills: 3 | Status: SHIPPED | OUTPATIENT
Start: 2018-08-21 | End: 2018-10-22 | Stop reason: SDUPTHER

## 2018-08-21 RX ORDER — GABAPENTIN 300 MG/1
300 CAPSULE ORAL NIGHTLY
Qty: 90 CAPSULE | Refills: 0 | Status: SHIPPED | OUTPATIENT
Start: 2018-08-21 | End: 2018-12-11 | Stop reason: SDUPTHER

## 2018-08-21 NOTE — PROGRESS NOTES
Visit Information    Have you changed or started any medications since your last visit including any over-the-counter medicines, vitamins, or herbal medicines? no   Are you having any side effects from any of your medications? -  no  Have you stopped taking any of your medications? Is so, why? -  no    Have you seen any other physician or provider since your last visit? Yes - Records Obtained  Have you had any other diagnostic tests since your last visit? No  Have you been seen in the emergency room and/or had an admission to a hospital since we last saw you? No  Have you had your routine dental cleaning in the past 6 months? no    Have you activated your vitalclip account? If not, what are your barriers?  Yes     Patient Care Team:  Gabriel Gonzalez MD as PCP - [facility] Joni Schwartz MD as PCP - [facility] Attributed Provider    Medical History Review  Past Medical, Family, and Social History reviewed and does not contribute to the patient presenting condition    Health Maintenance   Topic Date Due    DTaP/Tdap/Td vaccine (1 - Tdap) 01/18/1970    Shingles Vaccine (1 of 2 - 2 Dose Series) 01/18/2001    A1C test (Diabetic or Prediabetic)  07/18/2017    Flu vaccine (1) 09/01/2018    Potassium monitoring  04/13/2019    Creatinine monitoring  04/13/2019    Breast cancer screen  08/04/2019    Lipid screen  12/27/2022    Colon cancer screen colonoscopy  10/07/2026    DEXA (modify frequency per FRAX score)  Completed    Pneumococcal low/med risk  Completed    Hepatitis C screen  Completed     Chief Complaint   Patient presents with    Congestive Heart Failure     follow up     Edema     V  ,kll                                                                                                                                                                                                       OFFICE VISIT  PROGRESS NOTE                                                      Date of patient's visit:  8/21/2018  Patient's INSERTS / IMAGES    [x] Reviewed        Labs           Chemistry        Component Value Date/Time     (H) 04/13/2018 1029    K 4.4 04/13/2018 1029     (H) 04/13/2018 1029    CO2 25 04/13/2018 1029    BUN 27 (H) 04/13/2018 1029    CREATININE 0.91 (H) 04/13/2018 1029        Component Value Date/Time    CALCIUM 8.8 04/13/2018 1029    ALKPHOS 32 (L) 01/21/2018 2110    AST 22 01/21/2018 2110    ALT 19 01/21/2018 2110    BILITOT 0.21 (L) 01/21/2018 2110          Lab Results   Component Value Date    WBC 6.1 01/23/2018    HGB 12.0 01/23/2018    HCT 35.5 (L) 01/23/2018    MCV 94.6 01/23/2018     01/23/2018             Results for orders placed or performed during the hospital encounter of 05/08/18   DRUG SCREEN, PAIN   Result Value Ref Range    Pain Management Drug Panel Interp, Urine Inconsistent     6-Acetylmorphine, Ur Not Detected     7-Aminoclonazepam, Urine Not Detected     Alpha-OH-Alpraz, Urine Not Detected     Alprazolam, Urine Not Detected     Amphetamines, urine Not Detected     Barbiturates, Ur Not Detected     Benzoylecgonine, Ur Not Detected     Buprenorphine Urine Not Detected     Carisoprodol, Ur Not Detected     Clonazepam, Urine Not Detected     Codeine, Urine Not Detected     MDA, Ur Not Detected     Diazepam, Urine Not Detected     Ethyl Glucuronide Ur Not Detected     Fentanyl, Ur Not Detected     Hydrocodone, Urine Not Detected     Hydromorphone, Urine Not Detected     Lorazepam, Urine Not Detected     Marijuana Metab, Ur Not Detected     MDEA, RADHA, Ur Not Detected     MDMA, Urine Not Detected     Meperidine Metab, Ur Not Detected     Methadone, Urine Not Detected     Methamphetamine, Urine Not Detected     Methylphenidate Not Detected     Midazolam, Urine Not Detected     Morphine Urine Not Detected     Norbuprenorphine, Urine Not Detected     Nordiazepam, Urine Not Detected     Norfentanyl, Urine Not Detected     NORHYDROCODONE, URINE Not Detected     Noroxycodone, Urine Not Instructions on file for this visit. Medication List          Accurate as of 8/21/18  1:36 PM. If you have any questions, ask your nurse or doctor. START taking these medications    diptheria-tetanus toxoids 2-2 LF/0.5ML injection  Commonly known as:  DECAVAC  Inject 0.5 mLs into the muscle once for 1 dose  Started by:  Lory Aguirre MD     zoster recombinant adjuvanted vaccine 50 MCG Susr injection  Commonly known as:  SHINGRIX  50 MCG IM then repeat 2-6 months. Started by:  Lory Aguirre MD        CONTINUE taking these medications    acetaminophen 325 MG tablet  Commonly known as:  TYLENOL     apixaban 5 MG Tabs tablet  Commonly known as:  ELIQUIS  Take 1 tablet by mouth 2 times daily     aspirin 81 MG chewable tablet     atenolol 50 MG tablet  Commonly known as:  TENORMIN  Take 1 tablet by mouth daily     atorvastatin 80 MG tablet  Commonly known as:  LIPITOR  Take 1 tablet by mouth nightly     Calcium 500/D 500-125 MG-UNIT Tabs  Generic drug:  Calcium Carbonate-Vitamin D     citalopram 10 MG tablet  Commonly known as:  CELEXA  Take 1 tablet by mouth daily     cyclobenzaprine 10 MG tablet  Commonly known as:  FLEXERIL  Take 1 tablet by mouth 3 times daily as needed for Muscle spasms     famotidine 20 MG tablet  Commonly known as:  PEPCID  Take 1 tablet by mouth 2 times daily     fenofibrate micronized 134 MG capsule  Commonly known as:  LOFIBRA  Take 1 capsule by mouth every morning (before breakfast)     furosemide 40 MG tablet  Commonly known as:  LASIX  Take 1 tablet by mouth daily     hydrALAZINE 50 MG tablet  Commonly known as:  APRESOLINE  Take 1 tablet by mouth every 8 hours     HYDROcodone-acetaminophen 5-325 MG per tablet  Commonly known as:  NORCO  Take 1 tablet by mouth every 8 hours as needed for Pain for up to 30 days. Karen Bochazel      lisinopril 40 MG tablet  Commonly known as:  PRINIVIL;ZESTRIL  Take 1 tablet by mouth daily     nitroGLYCERIN 0.4 MG SL tablet  Commonly known as: NITROSTAT  Place 1 tablet under the tongue every 5 minutes as needed for Chest pain     pramipexole 1 MG tablet  Commonly known as:  MIRAPEX  Take 1.5 tablets by mouth daily     VITAMIN B 12 PO     vitamin D 1000 UNIT Tabs tablet  Commonly known as:  CHOLECALCIFEROL  Take 1 tablet by mouth daily           Where to Get Your Medications      You can get these medications from any pharmacy    Bring a paper prescription for each of these medications  · diptheria-tetanus toxoids 2-2 LF/0.5ML injection  · zoster recombinant adjuvanted vaccine 50 MCG Susr injection             FOLLOW UP  PLANS     No Follow-up on file. MD GENA Del Cid[address], [address] [place] [address].    Phone [phone]   Fax: [phone]  Answering Service: [phone]

## 2018-08-22 DIAGNOSIS — I50.32 CHRONIC DIASTOLIC HEART FAILURE (HCC): ICD-10-CM

## 2018-08-22 DIAGNOSIS — I82.5Y3 CHRONIC DEEP VEIN THROMBOSIS (DVT) OF PROXIMAL VEIN OF BOTH LOWER EXTREMITIES (HCC): Primary | ICD-10-CM

## 2018-08-23 ENCOUNTER — TELEPHONE (OUTPATIENT)
Dept: VASCULAR SURGERY | Age: 68
End: 2018-08-23

## 2018-08-23 ENCOUNTER — TELEPHONE (OUTPATIENT)
Dept: INTERNAL MEDICINE CLINIC | Age: 68
End: 2018-08-23

## 2018-08-23 DIAGNOSIS — I82.401 DEEP VEIN THROMBOSIS (DVT) OF RIGHT LOWER EXTREMITY, UNSPECIFIED CHRONICITY, UNSPECIFIED VEIN (HCC): Primary | ICD-10-CM

## 2018-08-23 DIAGNOSIS — I82.5Y3 CHRONIC DEEP VEIN THROMBOSIS (DVT) OF PROXIMAL VEIN OF BOTH LOWER EXTREMITIES (HCC): ICD-10-CM

## 2018-08-28 ENCOUNTER — HOSPITAL ENCOUNTER (OUTPATIENT)
Dept: VASCULAR LAB | Age: 68
Discharge: HOME | End: 2018-08-28
Payer: MEDICARE

## 2018-08-28 PROCEDURE — 93926 LOWER EXTREMITY STUDY: CPT

## 2018-08-28 PROCEDURE — 93970 EXTREMITY STUDY: CPT

## 2018-08-31 ENCOUNTER — HOSPITAL ENCOUNTER (OUTPATIENT)
Dept: PAIN MANAGEMENT | Age: 68
Discharge: HOME | End: 2018-08-31
Payer: MEDICARE

## 2018-08-31 VITALS
DIASTOLIC BLOOD PRESSURE: 69 MMHG | SYSTOLIC BLOOD PRESSURE: 142 MMHG | WEIGHT: 186 LBS | HEIGHT: 63 IN | RESPIRATION RATE: 16 BRPM | BODY MASS INDEX: 32.96 KG/M2 | HEART RATE: 59 BPM

## 2018-08-31 DIAGNOSIS — M47.816 SPONDYLOSIS OF LUMBAR REGION WITHOUT MYELOPATHY OR RADICULOPATHY: ICD-10-CM

## 2018-08-31 DIAGNOSIS — M47.816 FACET ARTHRITIS OF LUMBAR REGION: Primary | ICD-10-CM

## 2018-08-31 DIAGNOSIS — Z51.81 ENCOUNTER FOR MEDICATION MONITORING: ICD-10-CM

## 2018-08-31 DIAGNOSIS — M16.0 PRIMARY OSTEOARTHRITIS OF BOTH HIPS: ICD-10-CM

## 2018-08-31 DIAGNOSIS — M47.816 LUMBAR FACET ARTHROPATHY: ICD-10-CM

## 2018-08-31 DIAGNOSIS — M51.36 LUMBAR DEGENERATIVE DISC DISEASE: ICD-10-CM

## 2018-08-31 DIAGNOSIS — M51.36 DDD (DEGENERATIVE DISC DISEASE), LUMBAR: ICD-10-CM

## 2018-08-31 PROCEDURE — 99213 OFFICE O/P EST LOW 20 MIN: CPT

## 2018-08-31 PROCEDURE — 99214 OFFICE O/P EST MOD 30 MIN: CPT | Performed by: NURSE PRACTITIONER

## 2018-08-31 RX ORDER — HYDROCODONE BITARTRATE AND ACETAMINOPHEN 5; 325 MG/1; MG/1
1 TABLET ORAL EVERY 8 HOURS PRN
Qty: 90 TABLET | Refills: 0 | Status: SHIPPED | OUTPATIENT
Start: 2018-09-07 | End: 2018-10-04 | Stop reason: SDUPTHER

## 2018-08-31 ASSESSMENT — ENCOUNTER SYMPTOMS
BACK PAIN: 1
SHORTNESS OF BREATH: 0
COUGH: 0
CONSTIPATION: 0

## 2018-08-31 NOTE — PROGRESS NOTES
Contracts: Existing medication contract. Ali Memory, APRN - CNP)  Review of [place]AR[place] does not show any aberrant prescription behavior. Medication is helping the patient stay active. Patient denies any side effects and reports adequate analgesia. No sign of misuse/abuse.     Past Medical History:   Diagnosis Date    Allergic rhinitis, cause unspecified     Back pain     lumbar    Bowel obstruction (HCC)     history of due to scar tissue, resolved non-surgically    CAD (coronary artery disease)     Cellulitis     left leg    Cellulitis 2017 August    leg left leg/bug bite    Diverticulosis of colon (without mention of hemorrhage)     GERD (gastroesophageal reflux disease)     History of MI (myocardial infarction) 2005    thought due to a blood clot    History of ovarian cyst 1970    had oopherectomy    History of peritonitis 1968    due to ruptured appendix age 13    HTN (hypertension)     Hx of blood clots     right leg    Hyperlipidemia     Intestinal or peritoneal adhesions with obstruction (postoperative) (postinfection) ([address]     Kidney infection     renal failure/sepsis/spider bite    Lateral epicondylitis  of elbow     Muscle strain     right posterior shoulder    Other abnormal glucose     Restless legs syndrome (RLS)     Vitamin D deficiency     Wears glasses        Past Surgical History:   Procedure Laterality Date    ABDOMEN SURGERY  1976    benign tumor removed near remaining overy, 1.5 pounds    APPENDECTOMY  1968    appendix ruptured, developed peritonitis    BUNIONECTOMY Left     along with calcium deposits removed   [address]  2005    negative    COLECTOMY  1969    12 INCHES REMOVED D/T OBSTRUCTION    COLONOSCOPY      CYST REMOVAL Right     right facial    HYSTERECTOMY  1973    taken as a result of recurring cysts    OTHER SURGICAL HISTORY  8/14/14    FESS    OVARY REMOVAL  1970    UNILATERAL due to cyst   [address] partial, due to cyst    SINUS SURGERY  2004       Allergies   Allergen Reactions    Bactrim [Sulfamethoxazole-Trimethoprim] Other (See Comments)     sepsis    Codeine Itching         Current Outpatient Prescriptions:     [START ON 9/7/2018] HYDROcodone-acetaminophen (NORCO) 5-325 MG per tablet, Take 1 tablet by mouth every 8 hours as needed for Pain for up to 30 days. ., Disp: 90 tablet, Rfl: 0    lisinopril (PRINIVIL;ZESTRIL) 40 MG tablet, Take 1 tablet by mouth daily, Disp: 90 tablet, Rfl: 3    atenolol (TENORMIN) 50 MG tablet, Take 1 tablet by mouth daily, Disp: 90 tablet, Rfl: 3    hydrALAZINE (APRESOLINE) 50 MG tablet, Take 1 tablet by mouth every 8 hours, Disp: 90 tablet, Rfl: 3    pramipexole (MIRAPEX) 1 MG tablet, Take 1.5 tablets by mouth daily, Disp: 145 tablet, Rfl: 3    zoster recombinant adjuvanted vaccine (SHINGRIX) 50 MCG SUSR injection, 50 MCG IM then repeat 2-6 months., Disp: 0.5 mL, Rfl: 1    gabapentin (NEURONTIN) 300 MG capsule, Take 1 capsule by mouth nightly for 90 days. ., Disp: 90 capsule, Rfl: 0    metFORMIN (GLUCOPHAGE) 1000 MG tablet, Take 1 tablet by mouth daily (with breakfast), Disp: 90 tablet, Rfl: 0    furosemide (LASIX) 40 MG tablet, Take 1 tablet by mouth daily, Disp: 30 tablet, Rfl: 1    citalopram (CELEXA) 10 MG tablet, Take 1 tablet by mouth daily, Disp: 90 tablet, Rfl: 3    apixaban (ELIQUIS) 5 MG TABS tablet, Take 1 tablet by mouth 2 times daily, Disp: 180 tablet, Rfl: 3    acetaminophen (TYLENOL) 325 MG tablet, Take 650 mg by mouth every 6 hours as needed for Pain, Disp: , Rfl:     fenofibrate micronized (LOFIBRA) 134 MG capsule, Take 1 capsule by mouth every morning (before breakfast), Disp: 90 capsule, Rfl: 3    famotidine (PEPCID) 20 MG tablet, Take 1 tablet by mouth 2 times daily, Disp: 180 tablet, Rfl: 3    atorvastatin (LIPITOR) 80 MG tablet, Take 1 tablet by mouth nightly, Disp: 90 tablet, Rfl: 3    Cyanocobalamin (VITAMIN B 12 PO), Take by mouth, numbness and paresthesias. Physical Exam:  BP (!) 142/69   Pulse 59   Resp 16   Ht 5' 3\" (1.6 m)   Wt 186 lb (84.4 kg)   BMI 32.95 kg/m²     Physical Exam   Constitutional: She is oriented to person, place, and time and well-developed, well-nourished, and in no distress. Cardiovascular: Normal rate. Pulmonary/Chest: Effort normal.   Musculoskeletal: Normal range of motion. Right hip: She exhibits tenderness. Lumbar back: She exhibits tenderness. Neurological: She is alert and oriented to person, place, and time. Gait normal.   Skin: Skin is warm and dry. Psychiatric: Affect normal.       Record/Diagnostics Review:    Last darnell May  and was not appropriate, negative for prescribed med and its metabolites, will repeat today     Lumbar MRI 7/2018: Impression   1. No areas of critical spinal canal or neural foraminal narrowing.    2. Multilevel degenerative disc disease most significant at L4-L5 where there   is moderate bilateral neural foraminal narrowing related to facet and   ligamentum flavum hypertrophy with circumferential disc bulge       Assessment:    Problem List Items Addressed This Visit     Degenerative joint disease (DJD) of hip    Relevant Medications    HYDROcodone-acetaminophen (NORCO) 5-325 MG per tablet (Start on 9/7/2018)    Other Relevant Orders    Pain Management Drug Screen    Facet arthritis of lumbar region [facility]) - Primary    Relevant Medications    HYDROcodone-acetaminophen ([address]) 5-325 MG per tablet (Start on 9/7/2018)    Lumbar degenerative disc disease    Relevant Medications    HYDROcodone-acetaminophen (NORCO) 5-325 MG per tablet (Start on 9/7/2018)    Spondylosis of lumbar region without myelopathy or radiculopathy    Relevant Medications    HYDROcodone-acetaminophen (NORCO) 5-325 MG per tablet (Start on 9/7/2018)    Encounter for medication monitoring    Relevant Orders    Pain Management Drug Screen    Lumbar facet arthropathy ([facility] [address]    Relevant

## 2018-10-04 ENCOUNTER — HOSPITAL ENCOUNTER (OUTPATIENT)
Dept: PAIN MANAGEMENT | Age: 68
Discharge: HOME | End: 2018-10-04
Payer: MEDICARE

## 2018-10-04 VITALS
RESPIRATION RATE: 16 BRPM | DIASTOLIC BLOOD PRESSURE: 80 MMHG | SYSTOLIC BLOOD PRESSURE: 138 MMHG | WEIGHT: 186 LBS | OXYGEN SATURATION: 98 % | BODY MASS INDEX: 32.96 KG/M2 | TEMPERATURE: 98.7 F | HEART RATE: 61 BPM | HEIGHT: 63 IN

## 2018-10-04 DIAGNOSIS — M51.36 DDD (DEGENERATIVE DISC DISEASE), LUMBAR: ICD-10-CM

## 2018-10-04 DIAGNOSIS — M16.0 PRIMARY OSTEOARTHRITIS OF BOTH HIPS: ICD-10-CM

## 2018-10-04 DIAGNOSIS — M46.1 SACROILIITIS (HCC): Primary | ICD-10-CM

## 2018-10-04 DIAGNOSIS — Z51.81 ENCOUNTER FOR MEDICATION MONITORING: ICD-10-CM

## 2018-10-04 DIAGNOSIS — M51.36 LUMBAR DEGENERATIVE DISC DISEASE: ICD-10-CM

## 2018-10-04 DIAGNOSIS — M47.816 SPONDYLOSIS OF LUMBAR REGION WITHOUT MYELOPATHY OR RADICULOPATHY: ICD-10-CM

## 2018-10-04 DIAGNOSIS — M47.816 LUMBAR FACET ARTHROPATHY: ICD-10-CM

## 2018-10-04 PROCEDURE — 99214 OFFICE O/P EST MOD 30 MIN: CPT | Performed by: PAIN MEDICINE

## 2018-10-04 PROCEDURE — 80307 DRUG TEST PRSMV CHEM ANLYZR: CPT

## 2018-10-04 PROCEDURE — 99213 OFFICE O/P EST LOW 20 MIN: CPT

## 2018-10-04 RX ORDER — HYDROCODONE BITARTRATE AND ACETAMINOPHEN 5; 325 MG/1; MG/1
1 TABLET ORAL EVERY 12 HOURS PRN
Qty: 60 TABLET | Refills: 0 | Status: SHIPPED | OUTPATIENT
Start: 2018-10-10 | End: 2018-11-08 | Stop reason: SDUPTHER

## 2018-10-04 ASSESSMENT — PAIN DESCRIPTION - PAIN TYPE: TYPE: CHRONIC PAIN

## 2018-10-04 ASSESSMENT — ENCOUNTER SYMPTOMS
SHORTNESS OF BREATH: 1
CONSTIPATION: 0
GASTROINTESTINAL NEGATIVE: 1
HEARTBURN: 0
BACK PAIN: 1
BLURRED VISION: 1
ORTHOPNEA: 0
PHOTOPHOBIA: 0
SORE THROAT: 0
DOUBLE VISION: 0
BOWEL INCONTINENCE: 0
NAUSEA: 0

## 2018-10-04 ASSESSMENT — PAIN DESCRIPTION - PROGRESSION: CLINICAL_PROGRESSION: GRADUALLY WORSENING

## 2018-10-04 ASSESSMENT — PAIN DESCRIPTION - LOCATION: LOCATION: BACK

## 2018-10-04 ASSESSMENT — PAIN DESCRIPTION - FREQUENCY: FREQUENCY: INTERMITTENT

## 2018-10-04 ASSESSMENT — PAIN DESCRIPTION - DESCRIPTORS: DESCRIPTORS: BURNING;NUMBNESS;ACHING

## 2018-10-04 ASSESSMENT — PAIN DESCRIPTION - ONSET: ONSET: ON-GOING

## 2018-10-04 ASSESSMENT — PAIN DESCRIPTION - ORIENTATION: ORIENTATION: LOWER;MID

## 2018-10-04 ASSESSMENT — ACTIVITIES OF DAILY LIVING (ADL): EFFECT OF PAIN ON DAILY ACTIVITIES: PAIN INCREASES WITH WALKING AND STANDING

## 2018-10-04 NOTE — PROGRESS NOTES
Negative for rash. Neurological: Positive for tingling and paresthesias. Negative for dizziness, tremors, speech change, focal weakness, seizures and headaches. Right thigh numbness   Endo/Heme/Allergies: Bruises/bleeds easily. Psychiatric/Behavioral: Positive for depression. Negative for hallucinations, substance abuse and suicidal ideas. The patient is nervous/anxious. GENERAL PHYSICAL EXAM:  Vitals: /80   Pulse 61   Temp 98.7 °F (37.1 °C) (Oral)   Resp 16   Ht 5' 3\" (1.6 m)   Wt 186 lb (84.4 kg)   SpO2 98%   BMI 32.95 kg/m² , Body mass index is 32.95 kg/m². Physical Exam   Constitutional: She is oriented to person, place, and time. She appears well-developed and well-nourished. HENT:   Head: Normocephalic and atraumatic. Eyes: Pupils are equal, round, and reactive to light. Conjunctivae and EOM are normal.   Neck: Neck supple. No tracheal deviation present. No thyromegaly present. Cardiovascular: Normal rate and regular rhythm. Pulmonary/Chest: Effort normal. No respiratory distress. Abdominal: She exhibits no distension. Musculoskeletal: Normal range of motion. She exhibits no edema or tenderness. Neurological: She is alert and oriented to person, place, and time. She has normal strength. She displays no atrophy, no tremor and normal reflexes. No cranial nerve deficit or sensory deficit. She exhibits normal muscle tone. She displays a negative Romberg sign. Coordination normal.   Reflex Scores:       Patellar reflexes are 1+ on the right side and 1+ on the left side. Achilles reflexes are 1+ on the right side and 1+ on the left side. Skin: Skin is warm and dry. Psychiatric: She has a normal mood and affect. Her speech is normal and behavior is normal. Judgment and thought content normal. Cognition and memory are normal.    Right Ankle Exam     Muscle Strength   The patient has normal right ankle strength.       Left Ankle Exam     Muscle Strength advised to avoid tranquilizers or  Relaxants for any medications that can depress breathing or recreational drugs. Patient is also advised to tell us if there is any changes in his medications from other physicians. We discussed the same at today's visit and have been to implement it, as the patient's pain is somewhat under control with current medications. The following treatment plan was developed after discussion with patient:    Patient aPatient  has axial or localized   to bilateral Sacroiliac joint worse on the left side  Palpation showed tenderness over the SI joint . Patient failed all conservative treatment plans which included NSAIDS, activity modifications,home exercises, over the counter remedies, ice, heat and Physical / Chiropractic therapies. Patient's symptoms are gradually worsening with current treatment, interfering with sleep and activities of daily living. We discussed Left  Sacroiliac joint injectionsand re-evaluate symptoms in two weeks at an office visit. Patient agreed to the procedure which will be scheduled as soon as possible. All questions satisfactorily answered by me with the use of a spine model. Patient's pain is worse than the left SI joint. Hence we'll try a left SI joint injection for diagnostic as well as for therapeutic purposes. The procedure risks and alternative therapies were discussed at length with the patient and patient agreed to undergo the left SI joint injection in the near future. Depending on the response she may need injection on the right side too.   Orders Placed This Encounter   Procedures    Fluoro For Surgical Procedures     Standing Status:   Future     Standing Expiration Date:   10/4/2019    DRUG SCREEN, PAIN     Standing Status:   Standing     Number of Occurrences:   1    OH INJECT SI JOINT ARTHRGRPHY&/ANES/STEROID W/IMAGE     Left SI    Saline lock IV     At the time of admission as needed if patient requests iv sedation - Standing Status:   Standing     Number of Occurrences:   1       Decision Making Process : Patient's health history and referral records thoroughly reviewed before focused physical examination and discussion with patient. Over 50% of today's visit is spent on examining the patient and counseling. Level of complexity of date to be reviewed is Moderate. The chart date reviewed include the following: Imaging Reports. Summary of Care. Time spent reviewing with patient the below reports:   Medication safety, Treatment options. Level of diagnosis and management options of this case is multiple: involving the following management options: Interventions as needed, medication management as appropriate, future visits, activity modification, heat/ice as needed, Urine drug screen as required. [x]The patient's questions were answered to the best of my abilities. This note was created using voice recognition software. There may be inaccuracies of transcription  that are inadvertently overlooked prior to the signature. There is any questions about the transcription please contact me.     Electronically signed by Clarice De La Fuente MD on 10/4/2018 at 9:46 AM

## 2018-10-09 LAB
6-ACETYLMORPHINE, UR: NOT DETECTED
7-AMINOCLONAZEPAM, URINE: NOT DETECTED
ALPHA-OH-ALPRAZ, URINE: NOT DETECTED
ALPRAZOLAM, URINE: NOT DETECTED
AMPHETAMINES, URINE: NOT DETECTED
BARBITURATES, URINE: NOT DETECTED
BENZOYLECGONINE, UR: NOT DETECTED
BUPRENORPHINE URINE: NOT DETECTED
CARISOPRODOL, UR: NOT DETECTED
CLONAZEPAM, URINE: NOT DETECTED
CODEINE, URINE: NOT DETECTED
CREATININE URINE: 117.2 MG/DL (ref 20–400)
DIAZEPAM, URINE: NOT DETECTED
DRUGS EXPECTED, UR: NORMAL
EER HI RES INTERP UR: NORMAL
ETHYL GLUCURONIDE UR: NOT DETECTED
FENTANYL URINE: NOT DETECTED
HYDROCODONE, URINE: PRESENT
HYDROMORPHONE, URINE: NOT DETECTED
LORAZEPAM, URINE: NOT DETECTED
MARIJUANA METAB, UR: NOT DETECTED
MDA, UR: NOT DETECTED
MDEA, EVE, UR: NOT DETECTED
MDMA URINE: NOT DETECTED
MEPERIDINE METAB, UR: NOT DETECTED
METHADONE, URINE: NOT DETECTED
METHAMPHETAMINE, URINE: NOT DETECTED
METHYLPHENIDATE: NOT DETECTED
MIDAZOLAM, URINE: NOT DETECTED
MORPHINE URINE: NOT DETECTED
NORBUPRENORPHINE, URINE: NOT DETECTED
NORDIAZEPAM, URINE: NOT DETECTED
NORFENTANYL, URINE: NOT DETECTED
NORHYDROCODONE, URINE: PRESENT
NOROXYCODONE, URINE: NOT DETECTED
NOROXYMORPHONE, URINE: NOT DETECTED
OXAZEPAM, URINE: NOT DETECTED
OXYCODONE URINE: NOT DETECTED
OXYMORPHONE, URINE: NOT DETECTED
PAIN MANAGEMENT DRUG PANEL INTERP, URINE: NORMAL
PAIN MGT DRUG PANEL, HI RES, UR: NORMAL
PCP,URINE: NOT DETECTED
PHENTERMINE, UR: NOT DETECTED
PROPOXYPHENE, URINE: NOT DETECTED
TAPENTADOL, URINE: NOT DETECTED
TAPENTADOL-O-SULFATE, URINE: NOT DETECTED
TEMAZEPAM, URINE: NOT DETECTED
TRAMADOL, URINE: NOT DETECTED
ZOLPIDEM, URINE: NOT DETECTED

## 2018-10-16 ENCOUNTER — HOSPITAL ENCOUNTER (OUTPATIENT)
Dept: GENERAL RADIOLOGY | Age: 68
Discharge: HOME | End: 2018-10-18
Payer: MEDICARE

## 2018-10-16 ENCOUNTER — HOSPITAL ENCOUNTER (OUTPATIENT)
Dept: PAIN MANAGEMENT | Age: 68
Discharge: HOME | End: 2018-10-16
Payer: MEDICARE

## 2018-10-16 VITALS
SYSTOLIC BLOOD PRESSURE: 144 MMHG | HEIGHT: 63 IN | WEIGHT: 186 LBS | RESPIRATION RATE: 16 BRPM | OXYGEN SATURATION: 97 % | HEART RATE: 59 BPM | BODY MASS INDEX: 32.96 KG/M2 | DIASTOLIC BLOOD PRESSURE: 78 MMHG | TEMPERATURE: 97.8 F

## 2018-10-16 DIAGNOSIS — M46.1 SACROILIITIS (HCC): Primary | ICD-10-CM

## 2018-10-16 DIAGNOSIS — M46.1 SACROILIITIS (HCC): ICD-10-CM

## 2018-10-16 PROCEDURE — 99152 MOD SED SAME PHYS/QHP 5/>YRS: CPT | Performed by: PAIN MEDICINE

## 2018-10-16 PROCEDURE — 6360000004 HC RX CONTRAST MEDICATION

## 2018-10-16 PROCEDURE — G0260 INJ FOR SACROILIAC JT ANESTH: HCPCS

## 2018-10-16 PROCEDURE — 6360000002 HC RX W HCPCS: Performed by: PAIN MEDICINE

## 2018-10-16 PROCEDURE — 27096 INJECT SACROILIAC JOINT: CPT | Performed by: PAIN MEDICINE

## 2018-10-16 PROCEDURE — 6360000002 HC RX W HCPCS

## 2018-10-16 PROCEDURE — 3209999900 FLUORO FOR SURGICAL PROCEDURES

## 2018-10-16 RX ORDER — MIDAZOLAM HYDROCHLORIDE 1 MG/ML
INJECTION INTRAMUSCULAR; INTRAVENOUS
Status: COMPLETED | OUTPATIENT
Start: 2018-10-16 | End: 2018-10-16

## 2018-10-16 RX ADMIN — MIDAZOLAM 2 MG: 1 INJECTION INTRAMUSCULAR; INTRAVENOUS at 09:55

## 2018-10-16 ASSESSMENT — PAIN - FUNCTIONAL ASSESSMENT
PAIN_FUNCTIONAL_ASSESSMENT: 0-10

## 2018-10-22 ENCOUNTER — OFFICE VISIT (OUTPATIENT)
Dept: INTERNAL MEDICINE CLINIC | Age: 68
End: 2018-10-22
Payer: MEDICARE

## 2018-10-22 VITALS
BODY MASS INDEX: 32.07 KG/M2 | HEIGHT: 63 IN | DIASTOLIC BLOOD PRESSURE: 67 MMHG | WEIGHT: 181 LBS | SYSTOLIC BLOOD PRESSURE: 146 MMHG | HEART RATE: 62 BPM

## 2018-10-22 DIAGNOSIS — R60.0 EDEMA EXTREMITIES: ICD-10-CM

## 2018-10-22 DIAGNOSIS — R73.03 PREDIABETES: ICD-10-CM

## 2018-10-22 DIAGNOSIS — G89.29 CHRONIC MIDLINE LOW BACK PAIN WITHOUT SCIATICA: ICD-10-CM

## 2018-10-22 DIAGNOSIS — I82.401 DEEP VEIN THROMBOSIS (DVT) OF RIGHT LOWER EXTREMITY, UNSPECIFIED CHRONICITY, UNSPECIFIED VEIN (HCC): Primary | ICD-10-CM

## 2018-10-22 DIAGNOSIS — G25.81 RESTLESS LEGS SYNDROME (RLS): ICD-10-CM

## 2018-10-22 DIAGNOSIS — I50.33 ACUTE ON CHRONIC DIASTOLIC CONGESTIVE HEART FAILURE (HCC): ICD-10-CM

## 2018-10-22 DIAGNOSIS — M54.50 CHRONIC MIDLINE LOW BACK PAIN WITHOUT SCIATICA: ICD-10-CM

## 2018-10-22 DIAGNOSIS — I10 ESSENTIAL HYPERTENSION: ICD-10-CM

## 2018-10-22 PROCEDURE — G8427 DOCREV CUR MEDS BY ELIG CLIN: HCPCS | Performed by: INTERNAL MEDICINE

## 2018-10-22 PROCEDURE — G8417 CALC BMI ABV UP PARAM F/U: HCPCS | Performed by: INTERNAL MEDICINE

## 2018-10-22 PROCEDURE — 1090F PRES/ABSN URINE INCON ASSESS: CPT | Performed by: INTERNAL MEDICINE

## 2018-10-22 PROCEDURE — 1123F ACP DISCUSS/DSCN MKR DOCD: CPT | Performed by: INTERNAL MEDICINE

## 2018-10-22 PROCEDURE — 3017F COLORECTAL CA SCREEN DOC REV: CPT | Performed by: INTERNAL MEDICINE

## 2018-10-22 PROCEDURE — G8484 FLU IMMUNIZE NO ADMIN: HCPCS | Performed by: INTERNAL MEDICINE

## 2018-10-22 PROCEDURE — 1101F PT FALLS ASSESS-DOCD LE1/YR: CPT | Performed by: INTERNAL MEDICINE

## 2018-10-22 PROCEDURE — 1036F TOBACCO NON-USER: CPT | Performed by: INTERNAL MEDICINE

## 2018-10-22 PROCEDURE — G8598 ASA/ANTIPLAT THER USED: HCPCS | Performed by: INTERNAL MEDICINE

## 2018-10-22 PROCEDURE — G8399 PT W/DXA RESULTS DOCUMENT: HCPCS | Performed by: INTERNAL MEDICINE

## 2018-10-22 PROCEDURE — 4040F PNEUMOC VAC/ADMIN/RCVD: CPT | Performed by: INTERNAL MEDICINE

## 2018-10-22 PROCEDURE — 99214 OFFICE O/P EST MOD 30 MIN: CPT | Performed by: INTERNAL MEDICINE

## 2018-10-22 RX ORDER — SPIRONOLACTONE 25 MG/1
1 TABLET ORAL DAILY
Refills: 6 | COMMUNITY
Start: 2018-10-17 | End: 2019-03-22 | Stop reason: SDUPTHER

## 2018-10-22 RX ORDER — HYDRALAZINE HYDROCHLORIDE 50 MG/1
50 TABLET, FILM COATED ORAL EVERY 8 HOURS SCHEDULED
Qty: 90 TABLET | Refills: 3 | Status: SHIPPED | OUTPATIENT
Start: 2018-10-22 | End: 2018-12-11 | Stop reason: SDUPTHER

## 2018-10-22 RX ORDER — LISINOPRIL 40 MG/1
40 TABLET ORAL DAILY
Qty: 90 TABLET | Refills: 3 | Status: SHIPPED | OUTPATIENT
Start: 2018-10-22 | End: 2019-03-22 | Stop reason: SDUPTHER

## 2018-10-22 RX ORDER — CARVEDILOL 12.5 MG/1
1 TABLET ORAL 2 TIMES DAILY
Refills: 6 | COMMUNITY
Start: 2018-10-17 | End: 2019-03-22 | Stop reason: SDUPTHER

## 2018-10-22 RX ORDER — ATORVASTATIN CALCIUM 80 MG/1
80 TABLET, FILM COATED ORAL NIGHTLY
Qty: 90 TABLET | Refills: 3 | Status: SHIPPED | OUTPATIENT
Start: 2018-10-22 | End: 2019-02-08 | Stop reason: SDUPTHER

## 2018-10-22 ASSESSMENT — ENCOUNTER SYMPTOMS
SHORTNESS OF BREATH: 0
APNEA: 0
VOMITING: 0
ABDOMINAL DISTENTION: 0
EYE PAIN: 0
CHEST TIGHTNESS: 0
TROUBLE SWALLOWING: 0
VOICE CHANGE: 0
RECTAL PAIN: 0
COUGH: 0
FACIAL SWELLING: 0
RHINORRHEA: 0
WHEEZING: 0
CONSTIPATION: 0
EYE DISCHARGE: 0
EYE ITCHING: 0
NAUSEA: 0
PHOTOPHOBIA: 0
STRIDOR: 0
DIARRHEA: 0
COLOR CHANGE: 0
CHOKING: 0
SINUS PRESSURE: 0
ANAL BLEEDING: 0
SORE THROAT: 0
BLOOD IN STOOL: 0
EYE REDNESS: 0
ABDOMINAL PAIN: 0
BACK PAIN: 1

## 2018-10-26 ENCOUNTER — HOSPITAL ENCOUNTER (OUTPATIENT)
Age: 68
Setting detail: SPECIMEN
Discharge: HOME | End: 2018-10-26
Payer: MEDICARE

## 2018-10-26 LAB
ANION GAP SERPL CALCULATED.3IONS-SCNC: 18 MMOL/L (ref 9–17)
BUN BLDV-MCNC: 42 MG/DL (ref 8–23)
BUN/CREAT BLD: ABNORMAL (ref 9–20)
CALCIUM SERPL-MCNC: 8.9 MG/DL (ref 8.6–10.4)
CHLORIDE BLD-SCNC: 106 MMOL/L (ref 98–107)
CO2: 24 MMOL/L (ref 20–31)
CREAT SERPL-MCNC: 1.27 MG/DL (ref 0.5–0.9)
GFR AFRICAN AMERICAN: 51 ML/MIN
GFR NON-AFRICAN AMERICAN: 42 ML/MIN
GFR SERPL CREATININE-BSD FRML MDRD: ABNORMAL ML/MIN/{1.73_M2}
GFR SERPL CREATININE-BSD FRML MDRD: ABNORMAL ML/MIN/{1.73_M2}
GLUCOSE BLD-MCNC: 128 MG/DL (ref 70–99)
POTASSIUM SERPL-SCNC: 4.6 MMOL/L (ref 3.7–5.3)
SODIUM BLD-SCNC: 148 MMOL/L (ref 135–144)

## 2018-11-08 ENCOUNTER — HOSPITAL ENCOUNTER (OUTPATIENT)
Dept: PAIN MANAGEMENT | Age: 68
Discharge: HOME | End: 2018-11-08
Payer: MEDICARE

## 2018-11-08 ENCOUNTER — HOSPITAL ENCOUNTER (OUTPATIENT)
Age: 68
Setting detail: SPECIMEN
Discharge: HOME | End: 2018-11-08
Payer: MEDICARE

## 2018-11-08 VITALS
HEIGHT: 63 IN | SYSTOLIC BLOOD PRESSURE: 145 MMHG | DIASTOLIC BLOOD PRESSURE: 86 MMHG | OXYGEN SATURATION: 97 % | WEIGHT: 181 LBS | TEMPERATURE: 98.9 F | BODY MASS INDEX: 32.07 KG/M2 | HEART RATE: 62 BPM | RESPIRATION RATE: 16 BRPM

## 2018-11-08 DIAGNOSIS — M16.0 PRIMARY OSTEOARTHRITIS OF BOTH HIPS: ICD-10-CM

## 2018-11-08 DIAGNOSIS — M51.36 LUMBAR DEGENERATIVE DISC DISEASE: ICD-10-CM

## 2018-11-08 DIAGNOSIS — Z51.81 ENCOUNTER FOR MEDICATION MONITORING: ICD-10-CM

## 2018-11-08 DIAGNOSIS — M47.816 LUMBAR FACET ARTHROPATHY: ICD-10-CM

## 2018-11-08 DIAGNOSIS — M51.36 DDD (DEGENERATIVE DISC DISEASE), LUMBAR: ICD-10-CM

## 2018-11-08 DIAGNOSIS — M47.816 SPONDYLOSIS OF LUMBAR REGION WITHOUT MYELOPATHY OR RADICULOPATHY: ICD-10-CM

## 2018-11-08 DIAGNOSIS — G57.11 MERALGIA PARAESTHETICA, RIGHT: ICD-10-CM

## 2018-11-08 DIAGNOSIS — M47.816 FACET ARTHRITIS OF LUMBAR REGION: ICD-10-CM

## 2018-11-08 DIAGNOSIS — M46.1 SACROILIITIS (HCC): Primary | ICD-10-CM

## 2018-11-08 LAB
ANION GAP SERPL CALCULATED.3IONS-SCNC: 7 MMOL/L (ref 9–17)
BUN BLDV-MCNC: 37 MG/DL (ref 8–23)
BUN/CREAT BLD: ABNORMAL (ref 9–20)
CALCIUM SERPL-MCNC: 9.2 MG/DL (ref 8.6–10.4)
CHLORIDE BLD-SCNC: 108 MMOL/L (ref 98–107)
CO2: 25 MMOL/L (ref 20–31)
CREAT SERPL-MCNC: 1.12 MG/DL (ref 0.5–0.9)
GFR AFRICAN AMERICAN: 59 ML/MIN
GFR NON-AFRICAN AMERICAN: 49 ML/MIN
GFR SERPL CREATININE-BSD FRML MDRD: ABNORMAL ML/MIN/{1.73_M2}
GFR SERPL CREATININE-BSD FRML MDRD: ABNORMAL ML/MIN/{1.73_M2}
GLUCOSE BLD-MCNC: 104 MG/DL (ref 70–99)
POTASSIUM SERPL-SCNC: 5.2 MMOL/L (ref 3.7–5.3)
SODIUM BLD-SCNC: 140 MMOL/L (ref 135–144)
T3 FREE: 2.53 PG/ML (ref 2.02–4.43)
THYROXINE, FREE: 1.34 NG/DL (ref 0.93–1.7)
TSH SERPL DL<=0.05 MIU/L-ACNC: 1.88 MIU/L (ref 0.3–5)

## 2018-11-08 PROCEDURE — 99213 OFFICE O/P EST LOW 20 MIN: CPT

## 2018-11-08 PROCEDURE — 99214 OFFICE O/P EST MOD 30 MIN: CPT | Performed by: PAIN MEDICINE

## 2018-11-08 RX ORDER — HYDROCODONE BITARTRATE AND ACETAMINOPHEN 5; 325 MG/1; MG/1
1 TABLET ORAL EVERY 12 HOURS PRN
Qty: 60 TABLET | Refills: 0 | Status: SHIPPED | OUTPATIENT
Start: 2018-11-11 | End: 2018-11-28 | Stop reason: SDUPTHER

## 2018-11-08 ASSESSMENT — ENCOUNTER SYMPTOMS
SORE THROAT: 0
COUGH: 0
TROUBLE SWALLOWING: 0
RHINORRHEA: 0
SINUS PAIN: 1
FACIAL SWELLING: 0
PHOTOPHOBIA: 0
APNEA: 0
VOICE CHANGE: 0
RECTAL PAIN: 0
CHOKING: 0
ALLERGIC/IMMUNOLOGIC NEGATIVE: 1
NAUSEA: 0
ABDOMINAL PAIN: 0
VOMITING: 0
STRIDOR: 0
SHORTNESS OF BREATH: 1
WHEEZING: 0
CHEST TIGHTNESS: 0
CONSTIPATION: 0
EYES NEGATIVE: 1
SINUS PRESSURE: 1
BACK PAIN: 1
GASTROINTESTINAL NEGATIVE: 1

## 2018-11-08 ASSESSMENT — PAIN DESCRIPTION - ORIENTATION: ORIENTATION: RIGHT

## 2018-11-08 ASSESSMENT — PAIN DESCRIPTION - PAIN TYPE: TYPE: CHRONIC PAIN

## 2018-11-08 ASSESSMENT — PAIN DESCRIPTION - FREQUENCY: FREQUENCY: INTERMITTENT

## 2018-11-08 ASSESSMENT — PAIN SCALES - GENERAL: PAINLEVEL_OUTOF10: 4

## 2018-11-08 ASSESSMENT — PAIN DESCRIPTION - ONSET: ONSET: ON-GOING

## 2018-11-08 ASSESSMENT — PAIN DESCRIPTION - PROGRESSION: CLINICAL_PROGRESSION: GRADUALLY WORSENING

## 2018-11-08 ASSESSMENT — PAIN DESCRIPTION - DESCRIPTORS: DESCRIPTORS: ACHING;DULL;BURNING

## 2018-11-08 ASSESSMENT — PAIN DESCRIPTION - LOCATION: LOCATION: BACK;LEG

## 2018-11-08 NOTE — PROGRESS NOTES
08/05/2015 NEGATIVE NEG Final     Comment:           (Positive cutoff 200 ng/mL)                      Imaging:  Radiology Images and Reports reviewed where indicated and necessary      SOFT TISSUES: No paraspinal mass identified.  Dependent edema in the low back   overlies the sacrum and lower lumbar spine.  No localized fluid collection is   identified.       T12-L1:  Focal left paracentral disc protrusion effacing the thecal sac, but   not contacting the cord or descending neural elements.  No significant spinal   canal or neural foraminal narrowing.       L1-L2: Circumferential disc bulge with bilateral facet hypertrophy.  There is   no significant spinal canal or neural foraminal narrowing.       L2-L3: Circumferential disc bulge with mild facet and ligamentum flavum   hypertrophy.  There is mild bilateral neural foraminal narrowing right   greater than left.       L3-L4: Circumferential disc bulge with facet and ligamentum flavum   hypertrophy.  There is no significant spinal canal narrowing.  There is mild   bilateral neural foraminal narrowing.       L4-L5: Moderate-sized circumferential disc bulge eccentric to the left with   bilateral facet and ligament flavum hypertrophy.  There is mild spinal canal   narrowing.  There is moderate bilateral neural foraminal narrowing.       L5-S1: Focal left subarticular disc bulge with superimposed circumferential   disc bulge.  There is mild facet and ligamentum flavum hypertrophy.  Mild   spinal canal narrowing.  There is minimal bilateral neural foraminal   narrowing.       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           Impression   1. No areas of critical spinal canal or neural foraminal narrowing.    2. Multilevel degenerative disc disease most significant at L4-L5 where there   is moderate bilateral neural foraminal narrowing related to facet and   ligamentum flavum hypertrophy with circumferential disc bulge.           Patient Active Problem will continue current pain medications  Current medications are being tolerated without any Adverse side effects. Orders Placed This Encounter   Medications    HYDROcodone-acetaminophen (NORCO) 5-325 MG per tablet     Sig: Take 1 tablet by mouth every 12 hours as needed for Pain for up to 30 days. Norma Garcia Date: 11/11/18     Dispense:  60 tablet     Refill:  0     Urine drug screens have been appropriate. No aberrant activity noted. Analgesia is achieved. Activities of daily living are possible because of medications. Safe use of medications explained to patient. Counselling/Preventive measures for pain  Control:    [x]  Spine strengthening exercises are discussed with patient in detail. [x] Ill effects of being on chronic pain medications such as sleep disturbances, hormonal changes, withdrawal symptoms,  chronic opioid dependence and tolerance were discussed with patient. I had asked the patient to minimize medication use and utilize pain medications only for uncontrolled rest pain or pain with exertional activities. I advised patient not to self escalate pain medications without consulting with us. At each of patient's future visits we will try to taper pain medications, while adjusting the adjunct medications, and re-evaluating for Physical Therapy to improve spinal and joint strength. We will continue to have discussions to decrease pain medications as tolerated. I also discussed with the patient regarding the dangers of combining narcotic pain medication with tranquilizers, alcohol or illegal drugs or taking the medication any other than prescribed. The dangers including the respiratory depression and death. Patient was told to tell  to all  physicians regarding the medications he is getting from pain clinic. Patient is warned not to take any unprescribed medications over-the-counter medications that can depress breathing .  Patient is advised to talk to the pharmacist or physicians if

## 2018-11-09 ENCOUNTER — HOSPITAL ENCOUNTER (OUTPATIENT)
Dept: PAIN MANAGEMENT | Age: 68
Discharge: HOME | End: 2018-11-09
Payer: MEDICARE

## 2018-11-09 ENCOUNTER — HOSPITAL ENCOUNTER (OUTPATIENT)
Dept: GENERAL RADIOLOGY | Age: 68
Discharge: HOME | End: 2018-11-11
Payer: MEDICARE

## 2018-11-09 VITALS
SYSTOLIC BLOOD PRESSURE: 137 MMHG | BODY MASS INDEX: 32.07 KG/M2 | WEIGHT: 181 LBS | HEART RATE: 68 BPM | RESPIRATION RATE: 18 BRPM | OXYGEN SATURATION: 98 % | TEMPERATURE: 98.6 F | HEIGHT: 63 IN | DIASTOLIC BLOOD PRESSURE: 78 MMHG

## 2018-11-09 DIAGNOSIS — M46.1 SACROILIITIS (HCC): Primary | ICD-10-CM

## 2018-11-09 DIAGNOSIS — M46.1 SACROILIITIS (HCC): ICD-10-CM

## 2018-11-09 PROCEDURE — 6360000002 HC RX W HCPCS: Performed by: STUDENT IN AN ORGANIZED HEALTH CARE EDUCATION/TRAINING PROGRAM

## 2018-11-09 PROCEDURE — 3209999900 FLUORO FOR SURGICAL PROCEDURES

## 2018-11-09 PROCEDURE — 6360000004 HC RX CONTRAST MEDICATION

## 2018-11-09 PROCEDURE — G0260 INJ FOR SACROILIAC JT ANESTH: HCPCS

## 2018-11-09 PROCEDURE — 99152 MOD SED SAME PHYS/QHP 5/>YRS: CPT | Performed by: PAIN MEDICINE

## 2018-11-09 PROCEDURE — 27096 INJECT SACROILIAC JOINT: CPT | Performed by: PAIN MEDICINE

## 2018-11-09 PROCEDURE — 6360000002 HC RX W HCPCS

## 2018-11-09 RX ORDER — MIDAZOLAM HYDROCHLORIDE 1 MG/ML
INJECTION INTRAMUSCULAR; INTRAVENOUS
Status: COMPLETED | OUTPATIENT
Start: 2018-11-09 | End: 2018-11-09

## 2018-11-09 RX ADMIN — MIDAZOLAM 2 MG: 1 INJECTION INTRAMUSCULAR; INTRAVENOUS at 08:48

## 2018-11-09 ASSESSMENT — PAIN DESCRIPTION - PAIN TYPE: TYPE: CHRONIC PAIN

## 2018-11-09 ASSESSMENT — PAIN DESCRIPTION - LOCATION: LOCATION: BACK;LEG

## 2018-11-09 ASSESSMENT — PAIN SCALES - GENERAL
PAINLEVEL_OUTOF10: 4
PAINLEVEL_OUTOF10: 0

## 2018-11-09 ASSESSMENT — ACTIVITIES OF DAILY LIVING (ADL): EFFECT OF PAIN ON DAILY ACTIVITIES: PAIN INCREASES WITH WALKING AND STANDING

## 2018-11-09 ASSESSMENT — PAIN - FUNCTIONAL ASSESSMENT: PAIN_FUNCTIONAL_ASSESSMENT: 0-10

## 2018-11-09 ASSESSMENT — PAIN DESCRIPTION - PROGRESSION: CLINICAL_PROGRESSION: GRADUALLY WORSENING

## 2018-11-09 ASSESSMENT — PAIN DESCRIPTION - ONSET: ONSET: ON-GOING

## 2018-11-09 ASSESSMENT — PAIN DESCRIPTION - DESCRIPTORS: DESCRIPTORS: ACHING;BURNING;DULL

## 2018-11-09 ASSESSMENT — PAIN DESCRIPTION - FREQUENCY: FREQUENCY: INTERMITTENT

## 2018-11-09 NOTE — PROGRESS NOTES
Discharge criteria met. Post procedure dressing dry and intact. Sensory and motor function intact as per pre-procedure. Patient alert and oriented x3  Instructions and follow up reviewed with pt at patient at discharge. Discharged home transported by wheelchair, accompanied by family .   Discharged @495

## 2018-11-12 ENCOUNTER — TELEPHONE (OUTPATIENT)
Dept: PAIN MANAGEMENT | Age: 68
End: 2018-11-12

## 2018-11-28 ENCOUNTER — HOSPITAL ENCOUNTER (OUTPATIENT)
Dept: PAIN MANAGEMENT | Age: 68
Discharge: HOME | End: 2018-11-28
Payer: MEDICARE

## 2018-11-28 VITALS
HEART RATE: 67 BPM | OXYGEN SATURATION: 98 % | TEMPERATURE: 98.1 F | BODY MASS INDEX: 32.07 KG/M2 | DIASTOLIC BLOOD PRESSURE: 79 MMHG | HEIGHT: 63 IN | RESPIRATION RATE: 20 BRPM | WEIGHT: 181 LBS | SYSTOLIC BLOOD PRESSURE: 147 MMHG

## 2018-11-28 DIAGNOSIS — M47.816 LUMBAR FACET ARTHROPATHY: ICD-10-CM

## 2018-11-28 DIAGNOSIS — M46.1 SACROILIITIS (HCC): Primary | ICD-10-CM

## 2018-11-28 DIAGNOSIS — Z51.81 ENCOUNTER FOR MEDICATION MONITORING: ICD-10-CM

## 2018-11-28 DIAGNOSIS — M51.36 LUMBAR DEGENERATIVE DISC DISEASE: ICD-10-CM

## 2018-11-28 DIAGNOSIS — M51.36 DDD (DEGENERATIVE DISC DISEASE), LUMBAR: ICD-10-CM

## 2018-11-28 DIAGNOSIS — M47.816 SPONDYLOSIS OF LUMBAR REGION WITHOUT MYELOPATHY OR RADICULOPATHY: ICD-10-CM

## 2018-11-28 PROCEDURE — 99213 OFFICE O/P EST LOW 20 MIN: CPT

## 2018-11-28 PROCEDURE — 99214 OFFICE O/P EST MOD 30 MIN: CPT | Performed by: PAIN MEDICINE

## 2018-11-28 RX ORDER — HYDROCODONE BITARTRATE AND ACETAMINOPHEN 5; 325 MG/1; MG/1
1 TABLET ORAL EVERY 12 HOURS PRN
Qty: 60 TABLET | Refills: 0 | Status: SHIPPED | OUTPATIENT
Start: 2018-12-08 | End: 2019-01-11 | Stop reason: SDUPTHER

## 2018-11-28 ASSESSMENT — ENCOUNTER SYMPTOMS
CHEST TIGHTNESS: 0
PHOTOPHOBIA: 0
BACK PAIN: 1
CONSTIPATION: 0
SORE THROAT: 0
RHINORRHEA: 0
DIARRHEA: 0
SHORTNESS OF BREATH: 0

## 2018-11-28 ASSESSMENT — PAIN DESCRIPTION - PAIN TYPE: TYPE: CHRONIC PAIN

## 2018-11-28 ASSESSMENT — PAIN DESCRIPTION - DESCRIPTORS: DESCRIPTORS: ACHING;BURNING

## 2018-11-28 ASSESSMENT — PAIN DESCRIPTION - FREQUENCY: FREQUENCY: INTERMITTENT

## 2018-11-28 ASSESSMENT — PAIN DESCRIPTION - LOCATION: LOCATION: BACK

## 2018-11-28 ASSESSMENT — ACTIVITIES OF DAILY LIVING (ADL): EFFECT OF PAIN ON DAILY ACTIVITIES: PAIN INCREASES WITH WALKING AND STANDING

## 2018-11-28 ASSESSMENT — PAIN DESCRIPTION - PROGRESSION: CLINICAL_PROGRESSION: NOT CHANGED

## 2018-11-28 ASSESSMENT — PAIN DESCRIPTION - ONSET: ONSET: ON-GOING

## 2018-11-28 ASSESSMENT — PAIN DESCRIPTION - ORIENTATION: ORIENTATION: LEFT;LOWER

## 2018-11-28 ASSESSMENT — PAIN SCALES - GENERAL: PAINLEVEL_OUTOF10: 3

## 2018-11-28 NOTE — PROGRESS NOTES
hip, right thigh  Pain Descriptors: Aching, Burning  Pain Frequency: Intermittent  Pain Onset: On-going  Clinical Progression: Not changed  Effect of Pain on Daily Activities: pain increases with walking and standing  Patient's Stated Pain Goal:  (pain at 2 with increase activity)  Pain Intervention(s): Medication (see eMar), Cold applied, Rest (stretching )                    ADVERSE MEDICATION EFFECTS:   Constipation: no  Bowel Regimen: No:   Diet: common adult  Appetite:  ok  Sedation:  no  Urinary Retention: no    FOCUSED PAINSCALE:  Highest : 8  Lowest :0- after injection x 3 days  Average: Range-3  When and What  was your last procedure:   Left SI 11-9-18   Was your procedure effective:  100% x 3 days then returned    ACTIVITY/SOCIAL/EMOTIONAL:  Sleep Pattern: 6 hours per night. generally restful sleep  Energy Level:  Normal  Currently attending Physical Therapy:  No  Home Exercises: twice a week exercising at home  Mobility:pain increases with walking  Currently seeing a Psychiatrist or Psychologist:  No  Emotional Issues: depression with loss of son 2 years ago- has support at home   Mood: appropriate     ABERRANT BEHAVIORS SINCE LAST VISIT:  Have you ever been treated in another Pain Clinic not applicable  Refills for prescriptions appropriate: yes  Lost rx/pills:no  Taking more medication than prescribed:  no  Are you receiving PAIN medications from  other doctors: no  Last Urine/Serum Drug Screen :10-18  Was Serum/UDS as anticipated? yes  Brought pill bottles in :yes   Was Pill count appropriate? :yes.  #22 left   Are currently pregnant?not applicable  Recent ER visits: No             Past Medical History      Diagnosis Date    Allergic rhinitis, cause unspecified     Back pain     lumbar    Bowel obstruction (HCC)     history of due to scar tissue, resolved non-surgically    CAD (coronary artery disease)     Cellulitis     left leg    Cellulitis 2017 August    leg left leg/bug bite    (Patient taking differently: Take 50 mg by mouth every 8 hours ) 90 tablet 3    lisinopril (PRINIVIL;ZESTRIL) 40 MG tablet Take 1 tablet by mouth daily 90 tablet 3    metFORMIN (GLUCOPHAGE) 1000 MG tablet Take 1 tablet by mouth daily (with breakfast) 90 tablet 3    atorvastatin (LIPITOR) 80 MG tablet Take 1 tablet by mouth nightly 90 tablet 3    pramipexole (MIRAPEX) 1 MG tablet Take 1.5 tablets by mouth daily 145 tablet 3    gabapentin (NEURONTIN) 300 MG capsule Take 1 capsule by mouth nightly for 90 days. . 90 capsule 0    furosemide (LASIX) 40 MG tablet Take 1 tablet by mouth daily 30 tablet 1    citalopram (CELEXA) 10 MG tablet Take 1 tablet by mouth daily 90 tablet 3    apixaban (ELIQUIS) 5 MG TABS tablet Take 1 tablet by mouth 2 times daily 180 tablet 3    acetaminophen (TYLENOL) 325 MG tablet Take 650 mg by mouth every 6 hours as needed for Pain      fenofibrate micronized (LOFIBRA) 134 MG capsule Take 1 capsule by mouth every morning (before breakfast) 90 capsule 3    famotidine (PEPCID) 20 MG tablet Take 1 tablet by mouth 2 times daily 180 tablet 3    Cyanocobalamin (VITAMIN B 12 PO) Take by mouth      nitroGLYCERIN (NITROSTAT) 0.4 MG SL tablet Place 1 tablet under the tongue every 5 minutes as needed for Chest pain 75 tablet 3    vitamin D (CHOLECALCIFEROL) 1000 UNIT TABS tablet Take 1 tablet by mouth daily 90 tablet 3    cyclobenzaprine (FLEXERIL) 10 MG tablet Take 1 tablet by mouth 3 times daily as needed for Muscle spasms 270 tablet 3    aspirin 81 MG chewable tablet Take 81 mg by mouth daily      Calcium Carbonate-Vitamin D (CALCIUM 500/D) 500-125 MG-UNIT TABS Take 1 tablet by mouth 2 times daily       zoster recombinant adjuvanted vaccine (SHINGRIX) 50 MCG SUSR injection 50 MCG IM then repeat 2-6 months. 0.5 mL 1     No current facility-administered medications for this encounter.         Allergies  Bactrim [sulfamethoxazole-trimethoprim] and Codeine    Family History  family history place, and time. She appears well-developed and well-nourished. HENT:   Head: Normocephalic and atraumatic. Eyes: Pupils are equal, round, and reactive to light. Conjunctivae and EOM are normal.   Neck: Normal range of motion. Neck supple. No JVD present. No tracheal deviation present. No thyromegaly present. Cardiovascular: Normal rate and regular rhythm. Pulmonary/Chest: Effort normal. No apnea. No respiratory distress. Abdominal: She exhibits no distension. Musculoskeletal: Normal range of motion. She exhibits no edema or tenderness. Neurological: She is alert and oriented to person, place, and time. She has normal strength. She displays normal reflexes. No cranial nerve deficit or sensory deficit. She exhibits normal muscle tone. She displays a negative Romberg sign. Coordination normal.   Reflex Scores:       Patellar reflexes are 1+ on the right side and 1+ on the left side. Achilles reflexes are 1+ on the right side and 1+ on the left side. Skin: Skin is warm, dry and intact. No rash noted. No erythema. Psychiatric: She has a normal mood and affect. Her speech is normal and behavior is normal. Judgment and thought content normal. Cognition and memory are normal.    Right Ankle Exam     Muscle Strength   The patient has normal right ankle strength. Left Ankle Exam     Muscle Strength   The patient has normal left ankle strength. Right Knee Exam     Muscle Strength     The patient has normal right knee strength. Left Knee Exam     Muscle Strength     The patient has normal left knee strength. Right Hip Exam     Muscle Strength   The patient has normal right hip strength. Left Hip Exam     Muscle Strength   The patient has normal left hip strength. Back Exam     Tenderness   The patient is experiencing tenderness in the lumbar and sacroiliac. Range of Motion   Back extension: Limited and painful. Back flexion: Limited and painful.    Back lateral

## 2018-11-29 ASSESSMENT — ENCOUNTER SYMPTOMS: BOWEL INCONTINENCE: 0

## 2018-12-04 ENCOUNTER — HOSPITAL ENCOUNTER (OUTPATIENT)
Age: 68
Setting detail: SPECIMEN
Discharge: HOME | End: 2018-12-04
Payer: MEDICARE

## 2018-12-04 LAB
ANION GAP SERPL CALCULATED.3IONS-SCNC: 11 MMOL/L (ref 9–17)
BUN BLDV-MCNC: 48 MG/DL (ref 8–23)
BUN/CREAT BLD: ABNORMAL (ref 9–20)
CALCIUM SERPL-MCNC: 9 MG/DL (ref 8.6–10.4)
CHLORIDE BLD-SCNC: 107 MMOL/L (ref 98–107)
CO2: 24 MMOL/L (ref 20–31)
CREAT SERPL-MCNC: 1.22 MG/DL (ref 0.5–0.9)
FOLATE: 11.7 NG/ML
GFR AFRICAN AMERICAN: 53 ML/MIN
GFR NON-AFRICAN AMERICAN: 44 ML/MIN
GFR SERPL CREATININE-BSD FRML MDRD: ABNORMAL ML/MIN/{1.73_M2}
GFR SERPL CREATININE-BSD FRML MDRD: ABNORMAL ML/MIN/{1.73_M2}
GLUCOSE BLD-MCNC: 93 MG/DL (ref 70–99)
POTASSIUM SERPL-SCNC: 4.5 MMOL/L (ref 3.7–5.3)
SODIUM BLD-SCNC: 142 MMOL/L (ref 135–144)
VITAMIN B-12: 262 PG/ML (ref 232–1245)

## 2018-12-05 LAB
ALBUMIN (CALCULATED): 4 G/DL (ref 3.2–5.2)
ALBUMIN PERCENT: 63 % (ref 45–65)
ALPHA 1 PERCENT: 3 % (ref 3–6)
ALPHA 2 PERCENT: 10 % (ref 6–13)
ALPHA-1-GLOBULIN: 0.2 G/DL (ref 0.1–0.4)
ALPHA-2-GLOBULIN: 0.7 G/DL (ref 0.5–0.9)
BETA GLOBULIN: 0.9 G/DL (ref 0.5–1.1)
BETA PERCENT: 14 % (ref 11–19)
GAMMA GLOBULIN %: 11 % (ref 9–20)
GAMMA GLOBULIN: 0.7 G/DL (ref 0.5–1.5)
PATHOLOGIST: NORMAL
PATHOLOGIST: NORMAL
PROTEIN ELECTROPHORESIS, SERUM: NORMAL
SERUM IFX INTERP: NORMAL
TOTAL PROT. SUM,%: 101 % (ref 98–102)
TOTAL PROT. SUM: 6.5 G/DL (ref 6.3–8.2)
TOTAL PROTEIN: 6.4 G/DL (ref 6.4–8.3)

## 2018-12-11 ENCOUNTER — OFFICE VISIT (OUTPATIENT)
Dept: INTERNAL MEDICINE CLINIC | Age: 68
End: 2018-12-11
Payer: MEDICARE

## 2018-12-11 VITALS
HEIGHT: 63 IN | SYSTOLIC BLOOD PRESSURE: 130 MMHG | WEIGHT: 178 LBS | BODY MASS INDEX: 31.54 KG/M2 | DIASTOLIC BLOOD PRESSURE: 88 MMHG

## 2018-12-11 DIAGNOSIS — M79.604 PAIN OF RIGHT LOWER EXTREMITY: ICD-10-CM

## 2018-12-11 DIAGNOSIS — M47.27 LUMBOSACRAL SPONDYLOSIS WITH RADICULOPATHY: ICD-10-CM

## 2018-12-11 DIAGNOSIS — G25.81 RESTLESS LEGS SYNDROME (RLS): ICD-10-CM

## 2018-12-11 DIAGNOSIS — K40.90 NON-RECURRENT UNILATERAL INGUINAL HERNIA WITHOUT OBSTRUCTION OR GANGRENE: Primary | ICD-10-CM

## 2018-12-11 DIAGNOSIS — I10 ESSENTIAL HYPERTENSION: ICD-10-CM

## 2018-12-11 PROCEDURE — G8484 FLU IMMUNIZE NO ADMIN: HCPCS | Performed by: INTERNAL MEDICINE

## 2018-12-11 PROCEDURE — 99214 OFFICE O/P EST MOD 30 MIN: CPT | Performed by: INTERNAL MEDICINE

## 2018-12-11 PROCEDURE — 1123F ACP DISCUSS/DSCN MKR DOCD: CPT | Performed by: INTERNAL MEDICINE

## 2018-12-11 PROCEDURE — G8427 DOCREV CUR MEDS BY ELIG CLIN: HCPCS | Performed by: INTERNAL MEDICINE

## 2018-12-11 PROCEDURE — 3017F COLORECTAL CA SCREEN DOC REV: CPT | Performed by: INTERNAL MEDICINE

## 2018-12-11 PROCEDURE — 1090F PRES/ABSN URINE INCON ASSESS: CPT | Performed by: INTERNAL MEDICINE

## 2018-12-11 PROCEDURE — G8399 PT W/DXA RESULTS DOCUMENT: HCPCS | Performed by: INTERNAL MEDICINE

## 2018-12-11 PROCEDURE — 4040F PNEUMOC VAC/ADMIN/RCVD: CPT | Performed by: INTERNAL MEDICINE

## 2018-12-11 PROCEDURE — G8598 ASA/ANTIPLAT THER USED: HCPCS | Performed by: INTERNAL MEDICINE

## 2018-12-11 PROCEDURE — G8417 CALC BMI ABV UP PARAM F/U: HCPCS | Performed by: INTERNAL MEDICINE

## 2018-12-11 PROCEDURE — 1036F TOBACCO NON-USER: CPT | Performed by: INTERNAL MEDICINE

## 2018-12-11 PROCEDURE — 1101F PT FALLS ASSESS-DOCD LE1/YR: CPT | Performed by: INTERNAL MEDICINE

## 2018-12-11 RX ORDER — HYDRALAZINE HYDROCHLORIDE 50 MG/1
50 TABLET, FILM COATED ORAL EVERY 8 HOURS SCHEDULED
Qty: 360 TABLET | Refills: 3 | Status: SHIPPED | OUTPATIENT
Start: 2018-12-11 | End: 2019-01-22 | Stop reason: DRUGHIGH

## 2018-12-11 RX ORDER — PRAMIPEXOLE DIHYDROCHLORIDE 1 MG/1
1.5 TABLET ORAL DAILY
Qty: 145 TABLET | Refills: 3 | Status: SHIPPED | OUTPATIENT
Start: 2018-12-11 | End: 2019-02-08 | Stop reason: SDUPTHER

## 2018-12-11 RX ORDER — GABAPENTIN 300 MG/1
300 CAPSULE ORAL NIGHTLY
Qty: 90 CAPSULE | Refills: 1 | Status: SHIPPED | OUTPATIENT
Start: 2018-12-11 | End: 2019-03-22 | Stop reason: SDUPTHER

## 2018-12-11 ASSESSMENT — ENCOUNTER SYMPTOMS
VOMITING: 0
SORE THROAT: 0
CHEST TIGHTNESS: 0
DIARRHEA: 0
PHOTOPHOBIA: 0
NAUSEA: 0
APNEA: 0
WHEEZING: 0
SINUS PRESSURE: 0
BLOOD IN STOOL: 0
EYE REDNESS: 0
BACK PAIN: 0
TROUBLE SWALLOWING: 0
ANAL BLEEDING: 0
STRIDOR: 0
RECTAL PAIN: 0
EYE PAIN: 0
CONSTIPATION: 0
COLOR CHANGE: 0
VOICE CHANGE: 0
SHORTNESS OF BREATH: 0
COUGH: 0
EYE ITCHING: 0
EYE DISCHARGE: 0
CHOKING: 0
RHINORRHEA: 0
ABDOMINAL PAIN: 1
ABDOMINAL DISTENTION: 0
FACIAL SWELLING: 0

## 2018-12-11 NOTE — PROGRESS NOTES
Subjective:      Dannie Cardenas is a 80 y.o. female who presents today for follow up and management of her chronic medical conditions as noted below. HPI:    Dannie Cardenas is c/o of   Chief Complaint   Patient presents with    Hypertension     management cr 1.2     Congestive Heart Failure     edema better with compression socks     Groin Pain     right inguinal hernia possibly   .    leg pain      saw neurology to get emg  Past Medical History:   Diagnosis Date    Allergic rhinitis, cause unspecified     Back pain     lumbar    Bowel obstruction (HCC)     history of due to scar tissue, resolved non-surgically    CAD (coronary artery disease)     Cellulitis     left leg    Cellulitis 2017 August    leg left leg/bug bite    Diverticulosis of colon (without mention of hemorrhage)     GERD (gastroesophageal reflux disease)     History of MI (myocardial infarction) 2005    thought due to a blood clot    History of ovarian cyst 1970    had oopherectomy    History of peritonitis 1968    due to ruptured appendix age 13    HTN (hypertension)     Hx of blood clots     right leg    Hyperlipidemia     Intestinal or peritoneal adhesions with obstruction (postoperative) (postinfection) ([address]     Kidney infection     renal failure/sepsis/spider bite    Lateral epicondylitis  of elbow     Muscle strain     right posterior shoulder    Other abnormal glucose     Restless legs syndrome (RLS)     Vitamin D deficiency     Wears glasses        Past Surgical History:   Procedure Laterality Date    ABDOMEN SURGERY  1976    benign tumor removed near remaining overy, 1.5 pounds    APPENDECTOMY  1968    appendix ruptured, developed peritonitis    BUNIONECTOMY Left     along with calcium deposits removed   [address]  2005    negative    COLECTOMY  1969    12 INCHES REMOVED D/T OBSTRUCTION    COLONOSCOPY      CYST REMOVAL Right     right facial    HYSTERECTOMY 1973    taken as a result of recurring cysts    OTHER SURGICAL HISTORY  8/14/14    FESS    OVARY REMOVAL  1970    UNILATERAL due to cyst    OVARY REMOVAL  1971    partial, due to cyst    SINUS SURGERY  2004       Family History   Problem Relation Age of Onset    Stroke Mother     Diabetes Mother     Heart Disease Mother     High Blood Pressure Mother     Heart Disease Father     Heart Disease Brother     High Blood Pressure Brother     Heart Disease Maternal Grandmother     High Blood Pressure Sister        Social History     Social History    Marital status:      Spouse name: N/A    Number of children: N/A    Years of education: N/A     Occupational History    retired      Social History Main Topics    Smoking status: Former Smoker     Packs/day: 0.50     Years: 20.00     Types: Cigarettes     Start date: 8/11/1995     Quit date: 6/20/2017    Smokeless tobacco: Never Used      Comment: 6-20-17 quit 10 days ago    Alcohol use No      Comment: occasional    Drug use: No    Sexual activity: Not Asked     Other Topics Concern    None     Social History Narrative    None       Current Outpatient Prescriptions   Medication Sig Dispense Refill    pramipexole (MIRAPEX) 1 MG tablet Take 1.5 tablets by mouth daily 145 tablet 3    apixaban (ELIQUIS) 5 MG TABS tablet Take 1 tablet by mouth 2 times daily 180 tablet 3    hydrALAZINE (APRESOLINE) 50 MG tablet Take 1 tablet by mouth every 8 hours 360 tablet 3    gabapentin (NEURONTIN) 300 MG capsule Take 1 capsule by mouth nightly for 180 days. . 90 capsule 1    HYDROcodone-acetaminophen (NORCO) 5-325 MG per tablet Take 1 tablet by mouth every 12 hours as needed for Pain for up to 30 days. Veda Sudhakar Date: 12/8/18 60 tablet 0    carvedilol (COREG) 12.5 MG tablet Take 1 tablet by mouth 2 times daily  6    spironolactone (ALDACTONE) 25 MG tablet Take 1 tablet by mouth daily  6    lisinopril (PRINIVIL;ZESTRIL) 40 MG tablet Take 1 tablet by

## 2018-12-11 NOTE — PROGRESS NOTES
(1) 09/01/2018    Breast cancer screen  08/04/2019    A1C test (Diabetic or Prediabetic)  08/21/2019    Potassium monitoring  12/04/2019    Creatinine monitoring  12/04/2019    Lipid screen  12/27/2022    Colon cancer screen colonoscopy  10/07/2026    DEXA (modify frequency per FRAX score)  Completed    Pneumococcal low/med risk  Completed    Hepatitis C screen  Completed

## 2018-12-18 ENCOUNTER — HOSPITAL ENCOUNTER (OUTPATIENT)
Dept: CT IMAGING | Facility: CLINIC | Age: 68
Discharge: HOME | End: 2018-12-20
Payer: MEDICARE

## 2018-12-18 DIAGNOSIS — M79.604 PAIN OF RIGHT LOWER EXTREMITY: ICD-10-CM

## 2018-12-18 DIAGNOSIS — K40.90 NON-RECURRENT UNILATERAL INGUINAL HERNIA WITHOUT OBSTRUCTION OR GANGRENE: ICD-10-CM

## 2018-12-18 PROCEDURE — 6360000004 HC RX CONTRAST MEDICATION: Performed by: INTERNAL MEDICINE

## 2018-12-18 PROCEDURE — 2580000003 HC RX 258: Performed by: INTERNAL MEDICINE

## 2018-12-18 PROCEDURE — 74177 CT ABD & PELVIS W/CONTRAST: CPT

## 2018-12-18 RX ORDER — SODIUM CHLORIDE 0.9 % (FLUSH) 0.9 %
10 SYRINGE (ML) INJECTION PRN
Status: DISCONTINUED | OUTPATIENT
Start: 2018-12-18 | End: 2018-12-21 | Stop reason: HOSPADM

## 2018-12-18 RX ORDER — 0.9 % SODIUM CHLORIDE 0.9 %
80 INTRAVENOUS SOLUTION INTRAVENOUS ONCE
Status: COMPLETED | OUTPATIENT
Start: 2018-12-18 | End: 2018-12-18

## 2018-12-18 RX ADMIN — Medication 10 ML: at 09:38

## 2018-12-18 RX ADMIN — IOPAMIDOL 75 ML: 755 INJECTION, SOLUTION INTRAVENOUS at 09:37

## 2018-12-18 RX ADMIN — SODIUM CHLORIDE 80 ML: 9 INJECTION, SOLUTION INTRAVENOUS at 09:39

## 2018-12-18 RX ADMIN — IOHEXOL 50 ML: 240 INJECTION, SOLUTION INTRATHECAL; INTRAVASCULAR; INTRAVENOUS; ORAL at 09:12

## 2019-01-02 ENCOUNTER — TELEPHONE (OUTPATIENT)
Dept: PAIN MANAGEMENT | Age: 69
End: 2019-01-02

## 2019-01-11 ENCOUNTER — HOSPITAL ENCOUNTER (OUTPATIENT)
Dept: GENERAL RADIOLOGY | Age: 69
Discharge: HOME | End: 2019-01-13
Payer: MEDICARE

## 2019-01-11 ENCOUNTER — HOSPITAL ENCOUNTER (OUTPATIENT)
Dept: PAIN MANAGEMENT | Age: 69
Discharge: HOME | End: 2019-01-11
Payer: MEDICARE

## 2019-01-11 VITALS
WEIGHT: 181 LBS | TEMPERATURE: 97.3 F | RESPIRATION RATE: 16 BRPM | OXYGEN SATURATION: 97 % | BODY MASS INDEX: 32.07 KG/M2 | DIASTOLIC BLOOD PRESSURE: 84 MMHG | HEART RATE: 72 BPM | SYSTOLIC BLOOD PRESSURE: 143 MMHG | HEIGHT: 63 IN

## 2019-01-11 DIAGNOSIS — M46.1 SACROILIITIS (HCC): Primary | ICD-10-CM

## 2019-01-11 DIAGNOSIS — M46.1 SACROILIITIS (HCC): ICD-10-CM

## 2019-01-11 PROCEDURE — 99152 MOD SED SAME PHYS/QHP 5/>YRS: CPT | Performed by: PAIN MEDICINE

## 2019-01-11 PROCEDURE — 2500000003 HC RX 250 WO HCPCS

## 2019-01-11 PROCEDURE — 64635 DESTROY LUMB/SAC FACET JNT: CPT

## 2019-01-11 PROCEDURE — 6360000002 HC RX W HCPCS

## 2019-01-11 PROCEDURE — 64640 INJECTION TREATMENT OF NERVE: CPT | Performed by: PAIN MEDICINE

## 2019-01-11 PROCEDURE — 6360000002 HC RX W HCPCS: Performed by: PAIN MEDICINE

## 2019-01-11 PROCEDURE — 3209999900 FLUORO FOR SURGICAL PROCEDURES

## 2019-01-11 RX ORDER — MIDAZOLAM HYDROCHLORIDE 1 MG/ML
INJECTION INTRAMUSCULAR; INTRAVENOUS
Status: COMPLETED | OUTPATIENT
Start: 2019-01-11 | End: 2019-01-11

## 2019-01-11 RX ORDER — HYDROCODONE BITARTRATE AND ACETAMINOPHEN 5; 325 MG/1; MG/1
1 TABLET ORAL EVERY 12 HOURS PRN
Qty: 60 TABLET | Refills: 0 | Status: SHIPPED | OUTPATIENT
Start: 2019-01-11 | End: 2019-01-29 | Stop reason: SDUPTHER

## 2019-01-11 RX ORDER — FENTANYL CITRATE 50 UG/ML
INJECTION, SOLUTION INTRAMUSCULAR; INTRAVENOUS
Status: COMPLETED | OUTPATIENT
Start: 2019-01-11 | End: 2019-01-11

## 2019-01-11 RX ADMIN — FENTANYL CITRATE 50 MCG: 50 INJECTION, SOLUTION INTRAMUSCULAR; INTRAVENOUS at 08:39

## 2019-01-11 RX ADMIN — MIDAZOLAM 2 MG: 1 INJECTION INTRAMUSCULAR; INTRAVENOUS at 08:35

## 2019-01-11 ASSESSMENT — PAIN - FUNCTIONAL ASSESSMENT
PAIN_FUNCTIONAL_ASSESSMENT: 0-10

## 2019-01-11 ASSESSMENT — ACTIVITIES OF DAILY LIVING (ADL): EFFECT OF PAIN ON DAILY ACTIVITIES: PAIN INCREASES WITH WALKING AND STANDING

## 2019-01-11 ASSESSMENT — PAIN DESCRIPTION - DESCRIPTORS: DESCRIPTORS: ACHING;BURNING

## 2019-01-14 ENCOUNTER — TELEPHONE (OUTPATIENT)
Dept: PAIN MANAGEMENT | Age: 69
End: 2019-01-14

## 2019-01-22 ENCOUNTER — OFFICE VISIT (OUTPATIENT)
Dept: INTERNAL MEDICINE CLINIC | Age: 69
End: 2019-01-22
Payer: MEDICARE

## 2019-01-22 VITALS
HEIGHT: 63 IN | BODY MASS INDEX: 33.31 KG/M2 | WEIGHT: 188 LBS | DIASTOLIC BLOOD PRESSURE: 80 MMHG | SYSTOLIC BLOOD PRESSURE: 158 MMHG
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
## 2019-01-22 DIAGNOSIS — I10 ESSENTIAL HYPERTENSION: Primary | ICD-10-CM

## 2019-01-22 DIAGNOSIS — I82.401 DEEP VEIN THROMBOSIS (DVT) OF RIGHT LOWER EXTREMITY, UNSPECIFIED CHRONICITY, UNSPECIFIED VEIN (HCC): ICD-10-CM

## 2019-01-22 DIAGNOSIS — E11.59 TYPE 2 DIABETES MELLITUS WITH OTHER CIRCULATORY COMPLICATION, WITHOUT LONG-TERM CURRENT USE OF INSULIN (HCC): ICD-10-CM

## 2019-01-22 DIAGNOSIS — I50.32 CHRONIC DIASTOLIC HEART FAILURE (HCC): ICD-10-CM

## 2019-01-22 DIAGNOSIS — M47.816 FACET ARTHRITIS OF LUMBAR REGION: ICD-10-CM

## 2019-01-22 DIAGNOSIS — I82.5Y3 CHRONIC DEEP VEIN THROMBOSIS (DVT) OF PROXIMAL VEIN OF BOTH LOWER EXTREMITIES (HCC): ICD-10-CM

## 2019-01-22 DIAGNOSIS — I63.429 CEREBROVASCULAR ACCIDENT (CVA) DUE TO EMBOLISM OF ANTERIOR CEREBRAL ARTERY, UNSPECIFIED BLOOD VESSEL LATERALITY (HCC): ICD-10-CM

## 2019-01-22 DIAGNOSIS — F33.1 MAJOR DEPRESSIVE DISORDER, RECURRENT, MODERATE (HCC): ICD-10-CM

## 2019-01-22 DIAGNOSIS — N18.30 STAGE 3 CHRONIC KIDNEY DISEASE (HCC): ICD-10-CM

## 2019-01-22 PROCEDURE — G8399 PT W/DXA RESULTS DOCUMENT: HCPCS | Performed by: INTERNAL MEDICINE

## 2019-01-22 PROCEDURE — G8417 CALC BMI ABV UP PARAM F/U: HCPCS | Performed by: INTERNAL MEDICINE

## 2019-01-22 PROCEDURE — 4040F PNEUMOC VAC/ADMIN/RCVD: CPT | Performed by: INTERNAL MEDICINE

## 2019-01-22 PROCEDURE — 1090F PRES/ABSN URINE INCON ASSESS: CPT | Performed by: INTERNAL MEDICINE

## 2019-01-22 PROCEDURE — G8484 FLU IMMUNIZE NO ADMIN: HCPCS | Performed by: INTERNAL MEDICINE

## 2019-01-22 PROCEDURE — 2022F DILAT RTA XM EVC RTNOPTHY: CPT | Performed by: INTERNAL MEDICINE

## 2019-01-22 PROCEDURE — 99214 OFFICE O/P EST MOD 30 MIN: CPT | Performed by: INTERNAL MEDICINE

## 2019-01-22 PROCEDURE — 1101F PT FALLS ASSESS-DOCD LE1/YR: CPT | Performed by: INTERNAL MEDICINE

## 2019-01-22 PROCEDURE — G8598 ASA/ANTIPLAT THER USED: HCPCS | Performed by: INTERNAL MEDICINE

## 2019-01-22 PROCEDURE — 1123F ACP DISCUSS/DSCN MKR DOCD: CPT | Performed by: INTERNAL MEDICINE

## 2019-01-22 PROCEDURE — G8427 DOCREV CUR MEDS BY ELIG CLIN: HCPCS | Performed by: INTERNAL MEDICINE

## 2019-01-22 PROCEDURE — 3046F HEMOGLOBIN A1C LEVEL >9.0%: CPT | Performed by: INTERNAL MEDICINE

## 2019-01-22 PROCEDURE — 1036F TOBACCO NON-USER: CPT | Performed by: INTERNAL MEDICINE

## 2019-01-22 PROCEDURE — 3017F COLORECTAL CA SCREEN DOC REV: CPT | Performed by: INTERNAL MEDICINE

## 2019-01-22 RX ORDER — HYDRALAZINE HYDROCHLORIDE 100 MG/1
100 TABLET, FILM COATED ORAL EVERY 8 HOURS SCHEDULED
Qty: 90 TABLET | Refills: 3 | Status: SHIPPED | OUTPATIENT
Start: 2019-01-22 | End: 2019-08-14 | Stop reason: DRUGHIGH

## 2019-01-22 ASSESSMENT — ENCOUNTER SYMPTOMS
VOMITING: 0
EYE REDNESS: 0
EYE DISCHARGE: 0
DIARRHEA: 1
SHORTNESS OF BREATH: 0
FACIAL SWELLING: 0
ABDOMINAL PAIN: 1
VOICE CHANGE: 0
COLOR CHANGE: 0
PHOTOPHOBIA: 0
CONSTIPATION: 1
SORE THROAT: 0
EYE PAIN: 0
CHEST TIGHTNESS: 0
CHOKING: 0
WHEEZING: 0
EYE ITCHING: 0
RHINORRHEA: 0
ANAL BLEEDING: 0
ABDOMINAL DISTENTION: 0
COUGH: 0
SINUS PRESSURE: 0
RECTAL PAIN: 0
BACK PAIN: 1
TROUBLE SWALLOWING: 0
APNEA: 0
STRIDOR: 0
NAUSEA: 0
BLOOD IN STOOL: 0

## 2019-01-22 ASSESSMENT — PATIENT HEALTH QUESTIONNAIRE - PHQ9
SUM OF ALL RESPONSES TO PHQ QUESTIONS 1-9: 0
SUM OF ALL RESPONSES TO PHQ QUESTIONS 1-9: 0
1. LITTLE INTEREST OR PLEASURE IN DOING THINGS: 0
2. FEELING DOWN, DEPRESSED OR HOPELESS: 0
SUM OF ALL RESPONSES TO PHQ9 QUESTIONS 1 & 2: 0

## 2019-01-29 ENCOUNTER — HOSPITAL ENCOUNTER (OUTPATIENT)
Dept: PAIN MANAGEMENT | Age: 69
Discharge: HOME | End: 2019-01-29
Payer: MEDICARE

## 2019-01-29 DIAGNOSIS — M47.816 SPONDYLOSIS OF LUMBAR REGION WITHOUT MYELOPATHY OR RADICULOPATHY: ICD-10-CM

## 2019-01-29 DIAGNOSIS — M48.062 NEUROGENIC CLAUDICATION DUE TO LUMBAR SPINAL STENOSIS: ICD-10-CM

## 2019-01-29 DIAGNOSIS — Z51.81 ENCOUNTER FOR MEDICATION MONITORING: ICD-10-CM

## 2019-01-29 DIAGNOSIS — M51.36 DDD (DEGENERATIVE DISC DISEASE), LUMBAR: Primary | ICD-10-CM

## 2019-01-29 DIAGNOSIS — M47.816 FACET ARTHRITIS OF LUMBAR REGION: ICD-10-CM

## 2019-01-29 DIAGNOSIS — M47.816 LUMBAR FACET ARTHROPATHY: ICD-10-CM

## 2019-01-29 DIAGNOSIS — M46.1 SACROILIITIS (HCC): ICD-10-CM

## 2019-01-29 PROCEDURE — 99213 OFFICE O/P EST LOW 20 MIN: CPT

## 2019-01-29 PROCEDURE — 99213 OFFICE O/P EST LOW 20 MIN: CPT | Performed by: NURSE PRACTITIONER

## 2019-01-29 RX ORDER — HYDROCODONE BITARTRATE AND ACETAMINOPHEN 5; 325 MG/1; MG/1
1 TABLET ORAL EVERY 12 HOURS PRN
Qty: 60 TABLET | Refills: 0 | Status: SHIPPED | OUTPATIENT
Start: 2019-02-12 | End: 2019-03-13 | Stop reason: SDUPTHER

## 2019-01-29 ASSESSMENT — ENCOUNTER SYMPTOMS
BACK PAIN: 1
SHORTNESS OF BREATH: 0
CONSTIPATION: 0
COUGH: 0

## 2019-02-04 ENCOUNTER — HOSPITAL ENCOUNTER (OUTPATIENT)
Dept: PHYSICAL THERAPY | Age: 69
Setting detail: THERAPIES SERIES
Discharge: HOME | End: 2019-02-04
Payer: MEDICARE

## 2019-02-04 PROCEDURE — 97162 PT EVAL MOD COMPLEX 30 MIN: CPT

## 2019-02-04 PROCEDURE — 97110 THERAPEUTIC EXERCISES: CPT

## 2019-02-04 ASSESSMENT — PAIN DESCRIPTION - FREQUENCY: FREQUENCY: CONTINUOUS

## 2019-02-04 ASSESSMENT — PAIN DESCRIPTION - PAIN TYPE: TYPE: CHRONIC PAIN

## 2019-02-04 ASSESSMENT — PAIN DESCRIPTION - PROGRESSION: CLINICAL_PROGRESSION: GRADUALLY WORSENING

## 2019-02-04 ASSESSMENT — PAIN DESCRIPTION - LOCATION: LOCATION: BACK

## 2019-02-04 ASSESSMENT — PAIN DESCRIPTION - ONSET: ONSET: PROGRESSIVE

## 2019-02-04 ASSESSMENT — PAIN - FUNCTIONAL ASSESSMENT: PAIN_FUNCTIONAL_ASSESSMENT: PREVENTS OR INTERFERES WITH ALL ACTIVE AND SOME PASSIVE ACTIVITIES

## 2019-02-04 ASSESSMENT — PAIN DESCRIPTION - ORIENTATION: ORIENTATION: RIGHT

## 2019-02-04 ASSESSMENT — PAIN SCALES - GENERAL: PAINLEVEL_OUTOF10: 4

## 2019-02-06 ENCOUNTER — HOSPITAL ENCOUNTER (OUTPATIENT)
Dept: PHYSICAL THERAPY | Age: 69
Setting detail: THERAPIES SERIES
Discharge: HOME | End: 2019-02-06
Payer: MEDICARE

## 2019-02-06 PROCEDURE — 97113 AQUATIC THERAPY/EXERCISES: CPT

## 2019-02-08 DIAGNOSIS — G25.81 RESTLESS LEGS SYNDROME (RLS): ICD-10-CM

## 2019-02-08 DIAGNOSIS — R73.03 PREDIABETES: ICD-10-CM

## 2019-02-08 RX ORDER — PRAMIPEXOLE DIHYDROCHLORIDE 1 MG/1
1.5 TABLET ORAL DAILY
Qty: 145 TABLET | Refills: 3 | Status: SHIPPED | OUTPATIENT
Start: 2019-02-08 | End: 2019-08-13 | Stop reason: SDUPTHER

## 2019-02-08 RX ORDER — ATORVASTATIN CALCIUM 80 MG/1
80 TABLET, FILM COATED ORAL NIGHTLY
Qty: 90 TABLET | Refills: 3 | Status: SHIPPED | OUTPATIENT
Start: 2019-02-08 | End: 2019-09-26 | Stop reason: DRUGHIGH

## 2019-02-08 RX ORDER — FAMOTIDINE 20 MG/1
20 TABLET, FILM COATED ORAL 2 TIMES DAILY
Qty: 180 TABLET | Refills: 3 | Status: SHIPPED | OUTPATIENT
Start: 2019-02-08 | End: 2019-07-23 | Stop reason: SDUPTHER

## 2019-02-14 ENCOUNTER — HOSPITAL ENCOUNTER (OUTPATIENT)
Dept: PHYSICAL THERAPY | Age: 69
Setting detail: THERAPIES SERIES
Discharge: HOME | End: 2019-02-14
Payer: MEDICARE

## 2019-02-14 PROCEDURE — 97113 AQUATIC THERAPY/EXERCISES: CPT

## 2019-02-14 ASSESSMENT — PAIN DESCRIPTION - PAIN TYPE: TYPE: CHRONIC PAIN

## 2019-02-14 ASSESSMENT — PAIN DESCRIPTION - ORIENTATION: ORIENTATION: LEFT

## 2019-02-14 ASSESSMENT — PAIN SCALES - GENERAL: PAINLEVEL_OUTOF10: 3

## 2019-02-14 ASSESSMENT — PAIN DESCRIPTION - LOCATION: LOCATION: BUTTOCKS;HIP

## 2019-02-19 ENCOUNTER — HOSPITAL ENCOUNTER (OUTPATIENT)
Dept: PHYSICAL THERAPY | Age: 69
Setting detail: THERAPIES SERIES
Discharge: HOME | End: 2019-02-19
Payer: MEDICARE

## 2019-02-19 DIAGNOSIS — I50.32 CHRONIC DIASTOLIC HEART FAILURE (HCC): ICD-10-CM

## 2019-02-19 PROCEDURE — 97113 AQUATIC THERAPY/EXERCISES: CPT

## 2019-02-19 RX ORDER — FUROSEMIDE 40 MG/1
40 TABLET ORAL DAILY
Qty: 90 TABLET | Refills: 2 | Status: SHIPPED | OUTPATIENT
Start: 2019-02-19 | End: 2019-08-28 | Stop reason: ALTCHOICE

## 2019-02-19 ASSESSMENT — PAIN SCALES - GENERAL: PAINLEVEL_OUTOF10: 4

## 2019-02-19 ASSESSMENT — PAIN DESCRIPTION - LOCATION: LOCATION: BACK

## 2019-02-19 ASSESSMENT — PAIN DESCRIPTION - PAIN TYPE: TYPE: CHRONIC PAIN

## 2019-02-19 ASSESSMENT — PAIN DESCRIPTION - ORIENTATION: ORIENTATION: LOWER

## 2019-02-21 ENCOUNTER — HOSPITAL ENCOUNTER (OUTPATIENT)
Dept: PHYSICAL THERAPY | Age: 69
Setting detail: THERAPIES SERIES
Discharge: HOME | End: 2019-02-21
Payer: MEDICARE

## 2019-02-21 PROCEDURE — 97113 AQUATIC THERAPY/EXERCISES: CPT

## 2019-02-21 ASSESSMENT — PAIN SCALES - GENERAL: PAINLEVEL_OUTOF10: 2

## 2019-02-21 ASSESSMENT — PAIN DESCRIPTION - ORIENTATION: ORIENTATION: LOWER

## 2019-02-21 ASSESSMENT — PAIN DESCRIPTION - LOCATION: LOCATION: BACK

## 2019-02-21 ASSESSMENT — PAIN DESCRIPTION - PAIN TYPE: TYPE: CHRONIC PAIN

## 2019-02-26 ENCOUNTER — HOSPITAL ENCOUNTER (OUTPATIENT)
Dept: PHYSICAL THERAPY | Age: 69
Setting detail: THERAPIES SERIES
Discharge: HOME | End: 2019-02-26
Payer: MEDICARE

## 2019-02-26 PROCEDURE — 97113 AQUATIC THERAPY/EXERCISES: CPT

## 2019-02-26 ASSESSMENT — PAIN DESCRIPTION - ORIENTATION: ORIENTATION: LOWER

## 2019-02-26 ASSESSMENT — PAIN DESCRIPTION - LOCATION: LOCATION: BACK

## 2019-02-26 ASSESSMENT — PAIN SCALES - GENERAL: PAINLEVEL_OUTOF10: 3

## 2019-02-26 ASSESSMENT — PAIN DESCRIPTION - PAIN TYPE: TYPE: CHRONIC PAIN

## 2019-03-01 ENCOUNTER — HOSPITAL ENCOUNTER (OUTPATIENT)
Dept: PHYSICAL THERAPY | Age: 69
Setting detail: THERAPIES SERIES
Discharge: HOME | End: 2019-03-01
Payer: MEDICARE

## 2019-03-01 ENCOUNTER — APPOINTMENT (OUTPATIENT)
Dept: GENERAL RADIOLOGY | Age: 69
End: 2019-03-01
Payer: MEDICARE

## 2019-03-01 ENCOUNTER — HOSPITAL ENCOUNTER (EMERGENCY)
Age: 69
Discharge: HOME | End: 2019-03-01
Attending: EMERGENCY MEDICINE
Payer: MEDICARE

## 2019-03-01 VITALS
HEIGHT: 64 IN | OXYGEN SATURATION: 97 % | TEMPERATURE: 97.6 F | WEIGHT: 180 LBS | BODY MASS INDEX: 30.73 KG/M2 | DIASTOLIC BLOOD PRESSURE: 74 MMHG | RESPIRATION RATE: 12 BRPM | SYSTOLIC BLOOD PRESSURE: 108 MMHG | HEART RATE: 73 BPM

## 2019-03-01 DIAGNOSIS — S62.102A WRIST FRACTURE, CLOSED, LEFT, INITIAL ENCOUNTER: Primary | ICD-10-CM

## 2019-03-01 PROCEDURE — 99284 EMERGENCY DEPT VISIT MOD MDM: CPT

## 2019-03-01 PROCEDURE — 97113 AQUATIC THERAPY/EXERCISES: CPT

## 2019-03-01 PROCEDURE — 6360000002 HC RX W HCPCS: Performed by: EMERGENCY MEDICINE

## 2019-03-01 PROCEDURE — 6360000002 HC RX W HCPCS: Performed by: PHYSICIAN ASSISTANT

## 2019-03-01 PROCEDURE — 25605 CLTX DST RDL FX/EPHYS SEP W/: CPT

## 2019-03-01 PROCEDURE — 6370000000 HC RX 637 (ALT 250 FOR IP): Performed by: PHYSICIAN ASSISTANT

## 2019-03-01 PROCEDURE — 73100 X-RAY EXAM OF WRIST: CPT

## 2019-03-01 RX ORDER — MIDAZOLAM HYDROCHLORIDE 1 MG/ML
INJECTION INTRAMUSCULAR; INTRAVENOUS DAILY PRN
Status: COMPLETED | OUTPATIENT
Start: 2019-03-01 | End: 2019-03-01

## 2019-03-01 RX ORDER — FENTANYL CITRATE 50 UG/ML
INJECTION, SOLUTION INTRAMUSCULAR; INTRAVENOUS DAILY PRN
Status: COMPLETED | OUTPATIENT
Start: 2019-03-01 | End: 2019-03-01

## 2019-03-01 RX ORDER — HYDROCODONE BITARTRATE AND ACETAMINOPHEN 5; 325 MG/1; MG/1
2 TABLET ORAL ONCE
Status: COMPLETED | OUTPATIENT
Start: 2019-03-01 | End: 2019-03-01

## 2019-03-01 RX ORDER — ONDANSETRON 4 MG/1
4 TABLET, ORALLY DISINTEGRATING ORAL ONCE
Status: COMPLETED | OUTPATIENT
Start: 2019-03-01 | End: 2019-03-01

## 2019-03-01 RX ORDER — FENTANYL CITRATE 50 UG/ML
100 INJECTION, SOLUTION INTRAMUSCULAR; INTRAVENOUS ONCE
Status: DISCONTINUED | OUTPATIENT
Start: 2019-03-01 | End: 2019-03-02 | Stop reason: HOSPADM

## 2019-03-01 RX ORDER — MIDAZOLAM HYDROCHLORIDE 1 MG/ML
5 INJECTION INTRAMUSCULAR; INTRAVENOUS ONCE
Status: DISCONTINUED | OUTPATIENT
Start: 2019-03-01 | End: 2019-03-02 | Stop reason: HOSPADM

## 2019-03-01 RX ORDER — IBUPROFEN 600 MG/1
600 TABLET ORAL ONCE
Status: COMPLETED | OUTPATIENT
Start: 2019-03-01 | End: 2019-03-01

## 2019-03-01 RX ORDER — LIDOCAINE HYDROCHLORIDE 10 MG/ML
5 INJECTION, SOLUTION INFILTRATION; PERINEURAL ONCE
Status: DISCONTINUED | OUTPATIENT
Start: 2019-03-01 | End: 2019-03-02 | Stop reason: HOSPADM

## 2019-03-01 RX ORDER — IBUPROFEN 600 MG/1
600 TABLET ORAL EVERY 6 HOURS PRN
Qty: 30 TABLET | Refills: 0 | Status: SHIPPED | OUTPATIENT
Start: 2019-03-01 | End: 2019-04-03 | Stop reason: SDUPTHER

## 2019-03-01 RX ORDER — HYDROCODONE BITARTRATE AND ACETAMINOPHEN 5; 325 MG/1; MG/1
1 TABLET ORAL EVERY 8 HOURS PRN
Qty: 10 TABLET | Refills: 0 | Status: SHIPPED | OUTPATIENT
Start: 2019-03-01 | End: 2019-03-04

## 2019-03-01 RX ADMIN — MIDAZOLAM 1 MG: 1 INJECTION INTRAMUSCULAR; INTRAVENOUS at 20:02

## 2019-03-01 RX ADMIN — ONDANSETRON 4 MG: 4 TABLET, ORALLY DISINTEGRATING ORAL at 18:17

## 2019-03-01 RX ADMIN — FENTANYL CITRATE 100 MCG: 50 INJECTION, SOLUTION INTRAMUSCULAR; INTRAVENOUS at 19:45

## 2019-03-01 RX ADMIN — HYDROCODONE BITARTRATE AND ACETAMINOPHEN 2 TABLET: 5; 325 TABLET ORAL at 18:15

## 2019-03-01 RX ADMIN — IBUPROFEN 600 MG: 600 TABLET, FILM COATED ORAL at 18:17

## 2019-03-01 RX ADMIN — MIDAZOLAM 3 MG: 1 INJECTION INTRAMUSCULAR; INTRAVENOUS at 19:45

## 2019-03-01 ASSESSMENT — PAIN DESCRIPTION - ONSET: ONSET: SUDDEN

## 2019-03-01 ASSESSMENT — PAIN DESCRIPTION - PAIN TYPE
TYPE: CHRONIC PAIN
TYPE: ACUTE PAIN

## 2019-03-01 ASSESSMENT — PAIN DESCRIPTION - DESCRIPTORS
DESCRIPTORS: THROBBING
DESCRIPTORS: ACHING;BURNING;CONSTANT
DESCRIPTORS: THROBBING;SHARP

## 2019-03-01 ASSESSMENT — PAIN SCALES - GENERAL
PAINLEVEL_OUTOF10: 10
PAINLEVEL_OUTOF10: 3
PAINLEVEL_OUTOF10: 10
PAINLEVEL_OUTOF10: 6
PAINLEVEL_OUTOF10: 10
PAINLEVEL_OUTOF10: 10

## 2019-03-01 ASSESSMENT — PAIN DESCRIPTION - ORIENTATION
ORIENTATION: LOWER
ORIENTATION: LEFT
ORIENTATION: LEFT

## 2019-03-01 ASSESSMENT — PAIN DESCRIPTION - FREQUENCY
FREQUENCY: CONTINUOUS

## 2019-03-01 ASSESSMENT — PAIN DESCRIPTION - LOCATION
LOCATION: WRIST
LOCATION: BACK
LOCATION: WRIST

## 2019-03-01 ASSESSMENT — ENCOUNTER SYMPTOMS: BACK PAIN: 0

## 2019-03-01 ASSESSMENT — PAIN DESCRIPTION - PROGRESSION: CLINICAL_PROGRESSION: NOT CHANGED

## 2019-03-04 ENCOUNTER — APPOINTMENT (OUTPATIENT)
Dept: PHYSICAL THERAPY | Age: 69
End: 2019-03-04
Payer: MEDICARE

## 2019-03-05 ENCOUNTER — ANESTHESIA EVENT (OUTPATIENT)
Dept: OPERATING ROOM | Age: 69
End: 2019-03-05
Payer: MEDICARE

## 2019-03-05 ENCOUNTER — ANESTHESIA (OUTPATIENT)
Dept: OPERATING ROOM | Age: 69
End: 2019-03-05
Payer: MEDICARE

## 2019-03-05 ENCOUNTER — HOSPITAL ENCOUNTER (OUTPATIENT)
Age: 69
Setting detail: OUTPATIENT SURGERY
Discharge: HOME | End: 2019-03-05
Attending: ORTHOPAEDIC SURGERY | Admitting: ORTHOPAEDIC SURGERY
Payer: MEDICARE

## 2019-03-05 VITALS
WEIGHT: 180 LBS | OXYGEN SATURATION: 94 % | RESPIRATION RATE: 16 BRPM | DIASTOLIC BLOOD PRESSURE: 72 MMHG | BODY MASS INDEX: 30.73 KG/M2 | TEMPERATURE: 98.2 F | SYSTOLIC BLOOD PRESSURE: 168 MMHG | HEIGHT: 64 IN | HEART RATE: 80 BPM

## 2019-03-05 VITALS — TEMPERATURE: 95.5 F | SYSTOLIC BLOOD PRESSURE: 192 MMHG | OXYGEN SATURATION: 89 % | DIASTOLIC BLOOD PRESSURE: 84 MMHG

## 2019-03-05 DIAGNOSIS — S52.572A OTHER CLOSED INTRA-ARTICULAR FRACTURE OF DISTAL END OF LEFT RADIUS, INITIAL ENCOUNTER: Primary | ICD-10-CM

## 2019-03-05 LAB
GLUCOSE BLD-MCNC: 108 MG/DL (ref 65–105)
GLUCOSE BLD-MCNC: 108 MG/DL (ref 65–105)

## 2019-03-05 PROCEDURE — 6360000002 HC RX W HCPCS: Performed by: ORTHOPAEDIC SURGERY

## 2019-03-05 PROCEDURE — 2500000003 HC RX 250 WO HCPCS: Performed by: NURSE ANESTHETIST, CERTIFIED REGISTERED

## 2019-03-05 PROCEDURE — 3700000001 HC ADD 15 MINUTES (ANESTHESIA): Performed by: ORTHOPAEDIC SURGERY

## 2019-03-05 PROCEDURE — 3700000000 HC ANESTHESIA ATTENDED CARE: Performed by: ORTHOPAEDIC SURGERY

## 2019-03-05 PROCEDURE — 2720000010 HC SURG SUPPLY STERILE: Performed by: ORTHOPAEDIC SURGERY

## 2019-03-05 PROCEDURE — 2580000003 HC RX 258: Performed by: ORTHOPAEDIC SURGERY

## 2019-03-05 PROCEDURE — 7100000001 HC PACU RECOVERY - ADDTL 15 MIN: Performed by: ORTHOPAEDIC SURGERY

## 2019-03-05 PROCEDURE — 25607 OPTX DST RD XARTC FX/EPI SEP: CPT | Performed by: ORTHOPAEDIC SURGERY

## 2019-03-05 PROCEDURE — 6370000000 HC RX 637 (ALT 250 FOR IP): Performed by: ANESTHESIOLOGY

## 2019-03-05 PROCEDURE — 7100000031 HC ASPR PHASE II RECOVERY - ADDTL 15 MIN: Performed by: ORTHOPAEDIC SURGERY

## 2019-03-05 PROCEDURE — 6360000002 HC RX W HCPCS: Performed by: NURSE ANESTHETIST, CERTIFIED REGISTERED

## 2019-03-05 PROCEDURE — 2580000003 HC RX 258: Performed by: ANESTHESIOLOGY

## 2019-03-05 PROCEDURE — 3600000013 HC SURGERY LEVEL 3 ADDTL 15MIN: Performed by: ORTHOPAEDIC SURGERY

## 2019-03-05 PROCEDURE — 7100000030 HC ASPR PHASE II RECOVERY - FIRST 15 MIN: Performed by: ORTHOPAEDIC SURGERY

## 2019-03-05 PROCEDURE — 2500000003 HC RX 250 WO HCPCS: Performed by: ORTHOPAEDIC SURGERY

## 2019-03-05 PROCEDURE — 6360000002 HC RX W HCPCS: Performed by: ANESTHESIOLOGY

## 2019-03-05 PROCEDURE — 7100000000 HC PACU RECOVERY - FIRST 15 MIN: Performed by: ORTHOPAEDIC SURGERY

## 2019-03-05 PROCEDURE — 2580000003 HC RX 258: Performed by: NURSE ANESTHETIST, CERTIFIED REGISTERED

## 2019-03-05 PROCEDURE — C1713 ANCHOR/SCREW BN/BN,TIS/BN: HCPCS | Performed by: ORTHOPAEDIC SURGERY

## 2019-03-05 PROCEDURE — 82947 ASSAY GLUCOSE BLOOD QUANT: CPT

## 2019-03-05 PROCEDURE — 2709999900 HC NON-CHARGEABLE SUPPLY: Performed by: ORTHOPAEDIC SURGERY

## 2019-03-05 PROCEDURE — 3600000003 HC SURGERY LEVEL 3 BASE: Performed by: ORTHOPAEDIC SURGERY

## 2019-03-05 DEVICE — K WIRE FIX L6IN DIA0.062IN 1600662] MICROAIRE SURGICAL INSTRUMENTS INC]: Type: IMPLANTABLE DEVICE | Site: WRIST | Status: FUNCTIONAL

## 2019-03-05 DEVICE — SCREW BNE L14MM DIA3.5MM CORT VOLAR TI NONCANNULATED LOK: Type: IMPLANTABLE DEVICE | Site: WRIST | Status: FUNCTIONAL

## 2019-03-05 DEVICE — IMPLANTABLE DEVICE
Type: IMPLANTABLE DEVICE | Site: WRIST | Status: FUNCTIONAL
Brand: PEG FULLY THREADED

## 2019-03-05 DEVICE — SCREW BNE L12MM DIA3.5MM CORT VOLAR TI NONCANNULATED LOK: Type: IMPLANTABLE DEVICE | Site: WRIST | Status: FUNCTIONAL

## 2019-03-05 DEVICE — PLATE BNE W24.4XL56.6MM STD L DST DORS VOLAR RAD T ANAT DBL: Type: IMPLANTABLE DEVICE | Site: WRIST | Status: FUNCTIONAL

## 2019-03-05 DEVICE — PEG BONE FXTN L20MM D2.5MM FLLY THRDD ALPS HAND FRAC SSTM: Type: IMPLANTABLE DEVICE | Site: WRIST | Status: FUNCTIONAL

## 2019-03-05 RX ORDER — PROPOFOL 10 MG/ML
INJECTION, EMULSION INTRAVENOUS PRN
Status: DISCONTINUED | OUTPATIENT
Start: 2019-03-05 | End: 2019-03-05 | Stop reason: SDUPTHER

## 2019-03-05 RX ORDER — MEPERIDINE HYDROCHLORIDE 50 MG/ML
12.5 INJECTION INTRAMUSCULAR; INTRAVENOUS; SUBCUTANEOUS EVERY 5 MIN PRN
Status: DISCONTINUED | OUTPATIENT
Start: 2019-03-05 | End: 2019-03-05 | Stop reason: HOSPADM

## 2019-03-05 RX ORDER — SODIUM CHLORIDE, SODIUM LACTATE, POTASSIUM CHLORIDE, CALCIUM CHLORIDE 600; 310; 30; 20 MG/100ML; MG/100ML; MG/100ML; MG/100ML
INJECTION, SOLUTION INTRAVENOUS CONTINUOUS
Status: DISCONTINUED | OUTPATIENT
Start: 2019-03-05 | End: 2019-03-05 | Stop reason: HOSPADM

## 2019-03-05 RX ORDER — ONDANSETRON 2 MG/ML
INJECTION INTRAMUSCULAR; INTRAVENOUS PRN
Status: DISCONTINUED | OUTPATIENT
Start: 2019-03-05 | End: 2019-03-05 | Stop reason: SDUPTHER

## 2019-03-05 RX ORDER — HYDROCODONE BITARTRATE AND ACETAMINOPHEN 5; 325 MG/1; MG/1
1 TABLET ORAL EVERY 4 HOURS PRN
Qty: 30 TABLET | Refills: 0 | Status: SHIPPED | OUTPATIENT
Start: 2019-03-05 | End: 2019-04-03 | Stop reason: SDUPTHER

## 2019-03-05 RX ORDER — OXYCODONE HYDROCHLORIDE AND ACETAMINOPHEN 5; 325 MG/1; MG/1
2 TABLET ORAL PRN
Status: COMPLETED | OUTPATIENT
Start: 2019-03-05 | End: 2019-03-05

## 2019-03-05 RX ORDER — LABETALOL HYDROCHLORIDE 5 MG/ML
5 INJECTION, SOLUTION INTRAVENOUS EVERY 10 MIN PRN
Status: DISCONTINUED | OUTPATIENT
Start: 2019-03-05 | End: 2019-03-05 | Stop reason: HOSPADM

## 2019-03-05 RX ORDER — DEXAMETHASONE SODIUM PHOSPHATE 4 MG/ML
INJECTION, SOLUTION INTRA-ARTICULAR; INTRALESIONAL; INTRAMUSCULAR; INTRAVENOUS; SOFT TISSUE PRN
Status: DISCONTINUED | OUTPATIENT
Start: 2019-03-05 | End: 2019-03-05 | Stop reason: SDUPTHER

## 2019-03-05 RX ORDER — PROMETHAZINE HYDROCHLORIDE 25 MG/ML
6.25 INJECTION, SOLUTION INTRAMUSCULAR; INTRAVENOUS
Status: DISCONTINUED | OUTPATIENT
Start: 2019-03-05 | End: 2019-03-05 | Stop reason: CLARIF

## 2019-03-05 RX ORDER — LIDOCAINE HYDROCHLORIDE 10 MG/ML
INJECTION, SOLUTION EPIDURAL; INFILTRATION; INTRACAUDAL; PERINEURAL PRN
Status: DISCONTINUED | OUTPATIENT
Start: 2019-03-05 | End: 2019-03-05 | Stop reason: SDUPTHER

## 2019-03-05 RX ORDER — OXYCODONE HYDROCHLORIDE AND ACETAMINOPHEN 5; 325 MG/1; MG/1
1 TABLET ORAL PRN
Status: COMPLETED | OUTPATIENT
Start: 2019-03-05 | End: 2019-03-05

## 2019-03-05 RX ORDER — DIPHENHYDRAMINE HYDROCHLORIDE 50 MG/ML
12.5 INJECTION INTRAMUSCULAR; INTRAVENOUS
Status: DISCONTINUED | OUTPATIENT
Start: 2019-03-05 | End: 2019-03-05 | Stop reason: HOSPADM

## 2019-03-05 RX ORDER — FENTANYL CITRATE 50 UG/ML
INJECTION, SOLUTION INTRAMUSCULAR; INTRAVENOUS PRN
Status: DISCONTINUED | OUTPATIENT
Start: 2019-03-05 | End: 2019-03-05 | Stop reason: SDUPTHER

## 2019-03-05 RX ORDER — SODIUM CHLORIDE, SODIUM LACTATE, POTASSIUM CHLORIDE, CALCIUM CHLORIDE 600; 310; 30; 20 MG/100ML; MG/100ML; MG/100ML; MG/100ML
INJECTION, SOLUTION INTRAVENOUS CONTINUOUS PRN
Status: DISCONTINUED | OUTPATIENT
Start: 2019-03-05 | End: 2019-03-05 | Stop reason: SDUPTHER

## 2019-03-05 RX ORDER — SCOLOPAMINE TRANSDERMAL SYSTEM 1 MG/1
1 PATCH, EXTENDED RELEASE TRANSDERMAL ONCE
Status: DISCONTINUED | OUTPATIENT
Start: 2019-03-05 | End: 2019-03-05 | Stop reason: HOSPADM

## 2019-03-05 RX ADMIN — HYDROMORPHONE HYDROCHLORIDE 0.5 MG: 1 INJECTION, SOLUTION INTRAMUSCULAR; INTRAVENOUS; SUBCUTANEOUS at 13:21

## 2019-03-05 RX ADMIN — LIDOCAINE HYDROCHLORIDE 50 MG: 10 INJECTION, SOLUTION EPIDURAL; INFILTRATION; INTRACAUDAL; PERINEURAL at 11:38

## 2019-03-05 RX ADMIN — PROPOFOL 200 MG: 10 INJECTION, EMULSION INTRAVENOUS at 11:38

## 2019-03-05 RX ADMIN — FENTANYL CITRATE 50 MCG: 50 INJECTION, SOLUTION INTRAMUSCULAR; INTRAVENOUS at 11:58

## 2019-03-05 RX ADMIN — ONDANSETRON 4 MG: 2 INJECTION INTRAMUSCULAR; INTRAVENOUS at 12:35

## 2019-03-05 RX ADMIN — SODIUM CHLORIDE, POTASSIUM CHLORIDE, SODIUM LACTATE AND CALCIUM CHLORIDE: 600; 310; 30; 20 INJECTION, SOLUTION INTRAVENOUS at 10:32

## 2019-03-05 RX ADMIN — Medication 2 G: at 11:44

## 2019-03-05 RX ADMIN — FENTANYL CITRATE 50 MCG: 50 INJECTION, SOLUTION INTRAMUSCULAR; INTRAVENOUS at 11:50

## 2019-03-05 RX ADMIN — SODIUM CHLORIDE, POTASSIUM CHLORIDE, SODIUM LACTATE AND CALCIUM CHLORIDE: 600; 310; 30; 20 INJECTION, SOLUTION INTRAVENOUS at 11:33

## 2019-03-05 RX ADMIN — FENTANYL CITRATE 50 MCG: 50 INJECTION, SOLUTION INTRAMUSCULAR; INTRAVENOUS at 13:01

## 2019-03-05 RX ADMIN — DEXAMETHASONE SODIUM PHOSPHATE 4 MG: 4 INJECTION, SOLUTION INTRAMUSCULAR; INTRAVENOUS at 11:44

## 2019-03-05 RX ADMIN — HYDROMORPHONE HYDROCHLORIDE 0.5 MG: 1 INJECTION, SOLUTION INTRAMUSCULAR; INTRAVENOUS; SUBCUTANEOUS at 13:49

## 2019-03-05 RX ADMIN — OXYCODONE AND ACETAMINOPHEN 2 TABLET: 5; 325 TABLET ORAL at 14:26

## 2019-03-05 ASSESSMENT — PAIN SCALES - GENERAL
PAINLEVEL_OUTOF10: 5
PAINLEVEL_OUTOF10: 10
PAINLEVEL_OUTOF10: 5
PAINLEVEL_OUTOF10: 10

## 2019-03-05 ASSESSMENT — PULMONARY FUNCTION TESTS
PIF_VALUE: 13
PIF_VALUE: 13
PIF_VALUE: 11
PIF_VALUE: 13
PIF_VALUE: 13
PIF_VALUE: 4
PIF_VALUE: 13
PIF_VALUE: 12
PIF_VALUE: 13
PIF_VALUE: 12
PIF_VALUE: 13
PIF_VALUE: 13
PIF_VALUE: 10
PIF_VALUE: 13
PIF_VALUE: 24
PIF_VALUE: 13
PIF_VALUE: 2
PIF_VALUE: 13
PIF_VALUE: 11
PIF_VALUE: 13
PIF_VALUE: 1
PIF_VALUE: 13
PIF_VALUE: 12
PIF_VALUE: 13
PIF_VALUE: 12
PIF_VALUE: 13
PIF_VALUE: 1
PIF_VALUE: 13
PIF_VALUE: 0
PIF_VALUE: 13
PIF_VALUE: 2
PIF_VALUE: 13
PIF_VALUE: 23
PIF_VALUE: 2
PIF_VALUE: 13
PIF_VALUE: 0
PIF_VALUE: 0
PIF_VALUE: 11
PIF_VALUE: 11
PIF_VALUE: 0
PIF_VALUE: 13
PIF_VALUE: 2
PIF_VALUE: 13
PIF_VALUE: 13
PIF_VALUE: 0
PIF_VALUE: 13
PIF_VALUE: 1
PIF_VALUE: 13
PIF_VALUE: 0
PIF_VALUE: 13
PIF_VALUE: 11
PIF_VALUE: 13
PIF_VALUE: 11
PIF_VALUE: 13
PIF_VALUE: 13
PIF_VALUE: 1
PIF_VALUE: 13
PIF_VALUE: 14
PIF_VALUE: 13
PIF_VALUE: 12
PIF_VALUE: 12
PIF_VALUE: 13
PIF_VALUE: 13
PIF_VALUE: 2
PIF_VALUE: 13
PIF_VALUE: 13
PIF_VALUE: 1
PIF_VALUE: 13
PIF_VALUE: 11
PIF_VALUE: 13

## 2019-03-05 ASSESSMENT — PAIN DESCRIPTION - DESCRIPTORS
DESCRIPTORS: ACHING;CONSTANT
DESCRIPTORS: ACHING;CONSTANT
DESCRIPTORS: ACHING
DESCRIPTORS: ACHING;CONSTANT
DESCRIPTORS: ACHING

## 2019-03-05 ASSESSMENT — PAIN DESCRIPTION - PAIN TYPE
TYPE: SURGICAL PAIN

## 2019-03-05 ASSESSMENT — PAIN DESCRIPTION - PROGRESSION
CLINICAL_PROGRESSION: GRADUALLY WORSENING
CLINICAL_PROGRESSION: GRADUALLY WORSENING
CLINICAL_PROGRESSION: GRADUALLY IMPROVING
CLINICAL_PROGRESSION: GRADUALLY IMPROVING

## 2019-03-05 ASSESSMENT — PAIN DESCRIPTION - LOCATION
LOCATION: WRIST

## 2019-03-05 ASSESSMENT — PAIN DESCRIPTION - ONSET
ONSET: AWAKENED FROM SLEEP
ONSET: AWAKENED FROM SLEEP
ONSET: GRADUAL
ONSET: AWAKENED FROM SLEEP

## 2019-03-05 ASSESSMENT — PAIN DESCRIPTION - ORIENTATION
ORIENTATION: LEFT

## 2019-03-05 ASSESSMENT — PAIN DESCRIPTION - FREQUENCY
FREQUENCY: CONTINUOUS

## 2019-03-05 ASSESSMENT — ENCOUNTER SYMPTOMS: SHORTNESS OF BREATH: 1

## 2019-03-05 ASSESSMENT — PAIN - FUNCTIONAL ASSESSMENT: PAIN_FUNCTIONAL_ASSESSMENT: 0-10

## 2019-03-07 ENCOUNTER — APPOINTMENT (OUTPATIENT)
Dept: PHYSICAL THERAPY | Age: 69
End: 2019-03-07
Payer: MEDICARE

## 2019-03-13 ENCOUNTER — HOSPITAL ENCOUNTER (OUTPATIENT)
Dept: PAIN MANAGEMENT | Age: 69
Discharge: HOME | End: 2019-03-13
Payer: MEDICARE

## 2019-03-13 VITALS
TEMPERATURE: 98.1 F | OXYGEN SATURATION: 97 % | DIASTOLIC BLOOD PRESSURE: 98 MMHG | HEIGHT: 64 IN | RESPIRATION RATE: 16 BRPM | SYSTOLIC BLOOD PRESSURE: 201 MMHG | BODY MASS INDEX: 30.9 KG/M2 | HEART RATE: 73 BPM

## 2019-03-13 DIAGNOSIS — M46.1 SACROILIITIS (HCC): Primary | ICD-10-CM

## 2019-03-13 DIAGNOSIS — Z51.81 ENCOUNTER FOR MEDICATION MONITORING: ICD-10-CM

## 2019-03-13 DIAGNOSIS — M51.36 LUMBAR DEGENERATIVE DISC DISEASE: ICD-10-CM

## 2019-03-13 DIAGNOSIS — M47.816 LUMBAR FACET ARTHROPATHY: ICD-10-CM

## 2019-03-13 DIAGNOSIS — M47.816 SPONDYLOSIS OF LUMBAR REGION WITHOUT MYELOPATHY OR RADICULOPATHY: ICD-10-CM

## 2019-03-13 DIAGNOSIS — G57.10 MERALGIA PARESTHETICA, UNSPECIFIED LATERALITY: ICD-10-CM

## 2019-03-13 DIAGNOSIS — M51.36 DDD (DEGENERATIVE DISC DISEASE), LUMBAR: ICD-10-CM

## 2019-03-13 PROCEDURE — 99213 OFFICE O/P EST LOW 20 MIN: CPT

## 2019-03-13 PROCEDURE — 99213 OFFICE O/P EST LOW 20 MIN: CPT | Performed by: NURSE PRACTITIONER

## 2019-03-13 RX ORDER — HYDROCODONE BITARTRATE AND ACETAMINOPHEN 5; 325 MG/1; MG/1
1 TABLET ORAL EVERY 12 HOURS PRN
Qty: 60 TABLET | Refills: 0 | Status: SHIPPED | OUTPATIENT
Start: 2019-03-18 | End: 2019-04-12 | Stop reason: SDUPTHER

## 2019-03-13 ASSESSMENT — ENCOUNTER SYMPTOMS
RESPIRATORY NEGATIVE: 1
EYES NEGATIVE: 1
BACK PAIN: 1
GASTROINTESTINAL NEGATIVE: 1

## 2019-03-15 ENCOUNTER — OFFICE VISIT (OUTPATIENT)
Dept: NEUROLOGY | Age: 69
End: 2019-03-15
Payer: MEDICARE

## 2019-03-15 VITALS
SYSTOLIC BLOOD PRESSURE: 190 MMHG | DIASTOLIC BLOOD PRESSURE: 88 MMHG | BODY MASS INDEX: 32.5 KG/M2 | WEIGHT: 190.4 LBS | HEIGHT: 64 IN | HEART RATE: 64 BPM

## 2019-03-15 DIAGNOSIS — Z91.199 MEDICALLY NONCOMPLIANT: ICD-10-CM

## 2019-03-15 DIAGNOSIS — R29.6 FREQUENT FALLS: Primary | ICD-10-CM

## 2019-03-15 DIAGNOSIS — E53.8 VITAMIN B12 DEFICIENCY: ICD-10-CM

## 2019-03-15 DIAGNOSIS — G57.11 MERALGIA PARESTHETICA OF RIGHT SIDE: ICD-10-CM

## 2019-03-15 DIAGNOSIS — G62.9 PERIPHERAL POLYNEUROPATHY: ICD-10-CM

## 2019-03-15 DIAGNOSIS — I10 POORLY-CONTROLLED HYPERTENSION: ICD-10-CM

## 2019-03-15 PROCEDURE — G8484 FLU IMMUNIZE NO ADMIN: HCPCS | Performed by: PSYCHIATRY & NEUROLOGY

## 2019-03-15 PROCEDURE — 1101F PT FALLS ASSESS-DOCD LE1/YR: CPT | Performed by: PSYCHIATRY & NEUROLOGY

## 2019-03-15 PROCEDURE — 1090F PRES/ABSN URINE INCON ASSESS: CPT | Performed by: PSYCHIATRY & NEUROLOGY

## 2019-03-15 PROCEDURE — 3017F COLORECTAL CA SCREEN DOC REV: CPT | Performed by: PSYCHIATRY & NEUROLOGY

## 2019-03-15 PROCEDURE — G8427 DOCREV CUR MEDS BY ELIG CLIN: HCPCS | Performed by: PSYCHIATRY & NEUROLOGY

## 2019-03-15 PROCEDURE — 99205 OFFICE O/P NEW HI 60 MIN: CPT | Performed by: PSYCHIATRY & NEUROLOGY

## 2019-03-15 PROCEDURE — G8417 CALC BMI ABV UP PARAM F/U: HCPCS | Performed by: PSYCHIATRY & NEUROLOGY

## 2019-03-15 RX ORDER — LIDOCAINE 50 MG/G
OINTMENT TOPICAL
Qty: 240 G | Refills: 3 | Status: SHIPPED | OUTPATIENT
Start: 2019-03-15 | End: 2021-05-07

## 2019-03-18 ENCOUNTER — OFFICE VISIT (OUTPATIENT)
Dept: INTERNAL MEDICINE CLINIC | Age: 69
End: 2019-03-18
Payer: MEDICARE

## 2019-03-18 VITALS
WEIGHT: 183 LBS | BODY MASS INDEX: 31.24 KG/M2 | HEIGHT: 64 IN | DIASTOLIC BLOOD PRESSURE: 86 MMHG | SYSTOLIC BLOOD PRESSURE: 139 MMHG

## 2019-03-18 DIAGNOSIS — G57.10 MERALGIA PARESTHETICA, UNSPECIFIED LATERALITY: Primary | ICD-10-CM

## 2019-03-18 DIAGNOSIS — E78.2 MIXED HYPERLIPIDEMIA: ICD-10-CM

## 2019-03-18 DIAGNOSIS — E11.59 TYPE 2 DIABETES MELLITUS WITH OTHER CIRCULATORY COMPLICATION, WITHOUT LONG-TERM CURRENT USE OF INSULIN (HCC): ICD-10-CM

## 2019-03-18 DIAGNOSIS — I10 ESSENTIAL HYPERTENSION: ICD-10-CM

## 2019-03-18 DIAGNOSIS — I82.531 CHRONIC DEEP VEIN THROMBOSIS (DVT) OF POPLITEAL VEIN OF RIGHT LOWER EXTREMITY (HCC): ICD-10-CM

## 2019-03-18 DIAGNOSIS — G45.9 TIA (TRANSIENT ISCHEMIC ATTACK): ICD-10-CM

## 2019-03-18 DIAGNOSIS — N18.30 STAGE 3 CHRONIC KIDNEY DISEASE (HCC): ICD-10-CM

## 2019-03-18 DIAGNOSIS — S62.102D CLOSED FRACTURE OF LEFT WRIST WITH ROUTINE HEALING, SUBSEQUENT ENCOUNTER: ICD-10-CM

## 2019-03-18 DIAGNOSIS — E53.8 B12 DEFICIENCY: ICD-10-CM

## 2019-03-18 PROBLEM — S62.102A CLOSED FRACTURE OF LEFT WRIST: Status: ACTIVE | Noted: 2019-03-18

## 2019-03-18 PROCEDURE — G8417 CALC BMI ABV UP PARAM F/U: HCPCS | Performed by: INTERNAL MEDICINE

## 2019-03-18 PROCEDURE — G8598 ASA/ANTIPLAT THER USED: HCPCS | Performed by: INTERNAL MEDICINE

## 2019-03-18 PROCEDURE — 96372 THER/PROPH/DIAG INJ SC/IM: CPT | Performed by: INTERNAL MEDICINE

## 2019-03-18 PROCEDURE — 3046F HEMOGLOBIN A1C LEVEL >9.0%: CPT | Performed by: INTERNAL MEDICINE

## 2019-03-18 PROCEDURE — 99214 OFFICE O/P EST MOD 30 MIN: CPT | Performed by: INTERNAL MEDICINE

## 2019-03-18 PROCEDURE — 1101F PT FALLS ASSESS-DOCD LE1/YR: CPT | Performed by: INTERNAL MEDICINE

## 2019-03-18 PROCEDURE — 1090F PRES/ABSN URINE INCON ASSESS: CPT | Performed by: INTERNAL MEDICINE

## 2019-03-18 PROCEDURE — G8427 DOCREV CUR MEDS BY ELIG CLIN: HCPCS | Performed by: INTERNAL MEDICINE

## 2019-03-18 PROCEDURE — 4040F PNEUMOC VAC/ADMIN/RCVD: CPT | Performed by: INTERNAL MEDICINE

## 2019-03-18 PROCEDURE — 1036F TOBACCO NON-USER: CPT | Performed by: INTERNAL MEDICINE

## 2019-03-18 PROCEDURE — 2022F DILAT RTA XM EVC RTNOPTHY: CPT | Performed by: INTERNAL MEDICINE

## 2019-03-18 PROCEDURE — 1123F ACP DISCUSS/DSCN MKR DOCD: CPT | Performed by: INTERNAL MEDICINE

## 2019-03-18 PROCEDURE — G8399 PT W/DXA RESULTS DOCUMENT: HCPCS | Performed by: INTERNAL MEDICINE

## 2019-03-18 PROCEDURE — G8484 FLU IMMUNIZE NO ADMIN: HCPCS | Performed by: INTERNAL MEDICINE

## 2019-03-18 PROCEDURE — 3017F COLORECTAL CA SCREEN DOC REV: CPT | Performed by: INTERNAL MEDICINE

## 2019-03-18 RX ORDER — CYANOCOBALAMIN 1000 UG/ML
1000 INJECTION INTRAMUSCULAR; SUBCUTANEOUS ONCE
Qty: 1 ML | Refills: 0 | Status: SHIPPED | OUTPATIENT
Start: 2019-03-18 | End: 2019-08-13

## 2019-03-18 RX ORDER — CYANOCOBALAMIN 1000 UG/ML
1000 INJECTION INTRAMUSCULAR; SUBCUTANEOUS ONCE
Status: COMPLETED | OUTPATIENT
Start: 2019-03-18 | End: 2019-03-18

## 2019-03-18 RX ADMIN — CYANOCOBALAMIN 1000 MCG: 1000 INJECTION INTRAMUSCULAR; SUBCUTANEOUS at 13:05

## 2019-03-18 ASSESSMENT — ENCOUNTER SYMPTOMS
WHEEZING: 0
ABDOMINAL PAIN: 0
EYE ITCHING: 0
STRIDOR: 0
PHOTOPHOBIA: 0
COLOR CHANGE: 0
CONSTIPATION: 0
NAUSEA: 0
SHORTNESS OF BREATH: 0
EYE REDNESS: 0
COUGH: 0
SORE THROAT: 0
RHINORRHEA: 0
DIARRHEA: 0
ABDOMINAL DISTENTION: 0
RECTAL PAIN: 0
FACIAL SWELLING: 0
SINUS PRESSURE: 0
VOMITING: 0
APNEA: 0
CHEST TIGHTNESS: 0
BACK PAIN: 1
EYE PAIN: 0
ANAL BLEEDING: 0
CHOKING: 0
EYE DISCHARGE: 0
TROUBLE SWALLOWING: 0
BLOOD IN STOOL: 0
VOICE CHANGE: 0

## 2019-03-20 ENCOUNTER — OFFICE VISIT (OUTPATIENT)
Dept: ORTHOPEDIC SURGERY | Age: 69
End: 2019-03-20

## 2019-03-20 DIAGNOSIS — S62.102D CLOSED FRACTURE OF LEFT WRIST WITH ROUTINE HEALING, SUBSEQUENT ENCOUNTER: Primary | ICD-10-CM

## 2019-03-20 PROCEDURE — 99024 POSTOP FOLLOW-UP VISIT: CPT | Performed by: ORTHOPAEDIC SURGERY

## 2019-03-22 DIAGNOSIS — M47.27 LUMBOSACRAL SPONDYLOSIS WITH RADICULOPATHY: ICD-10-CM

## 2019-03-22 DIAGNOSIS — I10 ESSENTIAL HYPERTENSION: ICD-10-CM

## 2019-03-22 DIAGNOSIS — F32.A DEPRESSION, UNSPECIFIED DEPRESSION TYPE: ICD-10-CM

## 2019-03-22 RX ORDER — GABAPENTIN 300 MG/1
300 CAPSULE ORAL NIGHTLY
Qty: 90 CAPSULE | Refills: 1 | Status: SHIPPED | OUTPATIENT
Start: 2019-03-22 | End: 2019-08-28 | Stop reason: SINTOL

## 2019-03-22 RX ORDER — SPIRONOLACTONE 25 MG/1
25 TABLET ORAL DAILY
Qty: 90 TABLET | Refills: 3 | Status: SHIPPED | OUTPATIENT
Start: 2019-03-22 | End: 2019-08-13 | Stop reason: SDUPTHER

## 2019-03-22 RX ORDER — CITALOPRAM 10 MG/1
10 TABLET ORAL DAILY
Qty: 90 TABLET | Refills: 3 | Status: SHIPPED | OUTPATIENT
Start: 2019-03-22 | End: 2019-03-28 | Stop reason: SDUPTHER

## 2019-03-22 RX ORDER — CARVEDILOL 12.5 MG/1
12.5 TABLET ORAL 2 TIMES DAILY
Qty: 180 TABLET | Refills: 3 | Status: SHIPPED | OUTPATIENT
Start: 2019-03-22 | End: 2019-07-23 | Stop reason: SDUPTHER

## 2019-03-22 RX ORDER — LISINOPRIL 40 MG/1
40 TABLET ORAL DAILY
Qty: 90 TABLET | Refills: 3 | Status: SHIPPED | OUTPATIENT
Start: 2019-03-22 | End: 2019-03-28 | Stop reason: SDUPTHER

## 2019-03-27 DIAGNOSIS — I10 ESSENTIAL HYPERTENSION: ICD-10-CM

## 2019-03-27 DIAGNOSIS — F32.A DEPRESSION, UNSPECIFIED DEPRESSION TYPE: ICD-10-CM

## 2019-03-28 RX ORDER — CITALOPRAM 10 MG/1
10 TABLET ORAL DAILY
Qty: 90 TABLET | Refills: 3 | Status: SHIPPED | OUTPATIENT
Start: 2019-03-28 | End: 2019-06-27 | Stop reason: SDUPTHER

## 2019-03-28 RX ORDER — LISINOPRIL 40 MG/1
40 TABLET ORAL DAILY
Qty: 90 TABLET | Refills: 3 | Status: SHIPPED | OUTPATIENT
Start: 2019-03-28 | End: 2019-06-11

## 2019-03-28 RX ORDER — FENOFIBRATE 134 MG/1
134 CAPSULE ORAL
Qty: 90 CAPSULE | Refills: 3 | Status: SHIPPED | OUTPATIENT
Start: 2019-03-28 | End: 2019-08-13 | Stop reason: SDUPTHER

## 2019-04-03 DIAGNOSIS — S52.572A OTHER CLOSED INTRA-ARTICULAR FRACTURE OF DISTAL END OF LEFT RADIUS, INITIAL ENCOUNTER: ICD-10-CM

## 2019-04-03 RX ORDER — HYDROCODONE BITARTRATE AND ACETAMINOPHEN 5; 325 MG/1; MG/1
1 TABLET ORAL EVERY 4 HOURS PRN
Qty: 30 TABLET | Refills: 0 | Status: SHIPPED | OUTPATIENT
Start: 2019-04-03 | End: 2019-04-06

## 2019-04-03 RX ORDER — IBUPROFEN 600 MG/1
600 TABLET ORAL EVERY 6 HOURS PRN
Qty: 30 TABLET | Refills: 0 | Status: SHIPPED | OUTPATIENT
Start: 2019-04-03 | End: 2019-05-14

## 2019-04-03 NOTE — TELEPHONE ENCOUNTER
Patient is requesting a refill of her Norco and Ibuprofen 600. She had an ORIF of her left wrist 3/5/19. Last refill on Norco given by you was at surgery, she also had a Norco script written by her PCP on 3/18/19 # 61. Her last Ibuprofen refill was also the day of surgery.

## 2019-04-10 ENCOUNTER — OFFICE VISIT (OUTPATIENT)
Dept: ORTHOPEDIC SURGERY | Age: 69
End: 2019-04-10

## 2019-04-10 ENCOUNTER — HOSPITAL ENCOUNTER (OUTPATIENT)
Dept: MRI IMAGING | Facility: CLINIC | Age: 69
Discharge: HOME | End: 2019-04-12
Payer: MEDICARE

## 2019-04-10 VITALS — WEIGHT: 180 LBS | HEIGHT: 64 IN | BODY MASS INDEX: 30.73 KG/M2

## 2019-04-10 DIAGNOSIS — I10 POORLY-CONTROLLED HYPERTENSION: ICD-10-CM

## 2019-04-10 DIAGNOSIS — S62.102D CLOSED FRACTURE OF LEFT WRIST WITH ROUTINE HEALING, SUBSEQUENT ENCOUNTER: Primary | ICD-10-CM

## 2019-04-10 DIAGNOSIS — R29.6 FREQUENT FALLS: ICD-10-CM

## 2019-04-10 PROCEDURE — 99024 POSTOP FOLLOW-UP VISIT: CPT | Performed by: ORTHOPAEDIC SURGERY

## 2019-04-10 PROCEDURE — 70551 MRI BRAIN STEM W/O DYE: CPT

## 2019-04-10 NOTE — PROGRESS NOTES
Katia Magana M.D.            [address] [place] [address], [address] [facility], [facility] [address]             Dept Phone: [phone]             Dept Fax:  [phone]  Mark Garcia returns today status post ORIF left distal radius fracture on 3/5/19. No major complaints other than stiffness    Physical examination notes her wound is pristine. Steri-Strips removed. She has stiffness about 20° of dorsiflexion volar flexion supination pronation about 60° both ways she's making good  strength. Neurovascular intact    X-rays taken today reviewed by me show AP lateral views of the left distal raised shows the hardware and fracture in good position of both views    Impression  4 weeks status post ORIF left distal raised fracture    Plan  Patient will be continue with the splint does not need to wear 24 7 however. She is a be sent to physical therapy. We'll see her back here in 5 weeks'        XR taken today      Review of Systems   Constitutional: Negative. HENT: Negative. Respiratory: Negative. Cardiovascular: Negative. Neurological: Negative.     Musculoskeletal:   Follow-up (orif lt wrist)          Current Outpatient Medications:     ibuprofen (IBU) 600 MG tablet, Take 1 tablet by mouth every 6 hours as needed for Pain, Disp: 30 tablet, Rfl: 0    fenofibrate micronized (LOFIBRA) 134 MG capsule, Take 1 capsule by mouth every morning (before breakfast), Disp: 90 capsule, Rfl: 3    lisinopril (PRINIVIL;ZESTRIL) 40 MG tablet, Take 1 tablet by mouth daily, Disp: 90 tablet, Rfl: 3    citalopram (CELEXA) 10 MG tablet, Take 1 tablet by mouth daily, Disp: 90 tablet, Rfl: 3    spironolactone (ALDACTONE) 25 MG tablet, Take 1 tablet by mouth daily, Disp: 90 tablet, Rfl: 3    carvedilol (COREG) 12.5 MG tablet, Take 1 tablet by mouth 2 times daily, Disp: 180 tablet, Rfl: 3    apixaban (ELIQUIS) 5 MG TABS tablet, Take 1 tablet by mouth 2 times daily, Disp: 180 tablet, Rfl: 3    gabapentin (NEURONTIN) 300 MG capsule, Take 1 capsule by mouth nightly for 180 days. , Disp: 90 capsule, Rfl: 1    lidocaine (XYLOCAINE) 5 % ointment, 4-5 times a day to your right thigh for pain., Disp: 240 g, Rfl: 3    HYDROcodone-acetaminophen (NORCO) 5-325 MG per tablet, Take 1 tablet by mouth every 12 hours as needed for Pain for up to 30 days. , Disp: 60 tablet, Rfl: 0    furosemide (LASIX) 40 MG tablet, Take 1 tablet by mouth daily, Disp: 90 tablet, Rfl: 2    famotidine (PEPCID) 20 MG tablet, Take 1 tablet by mouth 2 times daily, Disp: 180 tablet, Rfl: 3    metFORMIN (GLUCOPHAGE) 1000 MG tablet, Take 1 tablet by mouth daily (with breakfast), Disp: 90 tablet, Rfl: 3    pramipexole (MIRAPEX) 1 MG tablet, Take 1.5 tablets by mouth daily, Disp: 145 tablet, Rfl: 3    atorvastatin (LIPITOR) 80 MG tablet, Take 1 tablet by mouth nightly, Disp: 90 tablet, Rfl: 3    hydrALAZINE (APRESOLINE) 100 MG tablet, Take 1 tablet by mouth every 8 hours, Disp: 90 tablet, Rfl: 3    Cyanocobalamin (VITAMIN B 12 PO), Take by mouth, Disp: , Rfl:     nitroGLYCERIN (NITROSTAT) 0.4 MG SL tablet, Place 1 tablet under the tongue every 5 minutes as needed for Chest pain, Disp: 75 tablet, Rfl: 3    vitamin D (CHOLECALCIFEROL) 1000 UNIT TABS tablet, Take 1 tablet by mouth daily, Disp: 90 tablet, Rfl: 3    cyclobenzaprine (FLEXERIL) 10 MG tablet, Take 1 tablet by mouth 3 times daily as needed for Muscle spasms, Disp: 270 tablet, Rfl: 3    Calcium Carbonate-Vitamin D (CALCIUM 500/D) 500-125 MG-UNIT TABS, Take 1 tablet by mouth 2 times daily , Disp: , Rfl:     cyanocobalamin 1000 MCG/ML injection, Inject 1 mL into the muscle once for 1 dose, Disp: 1 mL, Rfl: 0    Allergies   Allergen Reactions    Bactrim [Sulfamethoxazole-Trimethoprim] Other (See Comments)     sepsis    Codeine Itching    Seasonal        Social History     Socioeconomic History    Marital status:      Spouse name: Not on file    Number of children: Not on file    Years of education: Not on file    Highest education level: Not on file   Occupational History    Occupation: retired   Social Needs    Financial resource strain: Not on file    Food insecurity:     Worry: Not on file     Inability: Not on file   momondo needs:     Medical: Not on file     Non-medical: Not on file   Tobacco Use    Smoking status: Former Smoker     Packs/day: 0.50     Years: 20.00     Pack years: 10.00     Types: Cigarettes     Start date: 1995     Last attempt to quit: 2017     Years since quittin.8    Smokeless tobacco: Never Used    Tobacco comment: 18 quit 10 days ago   Substance and Sexual Activity    Alcohol use: No     Alcohol/week: 0.0 oz    Drug use: No    Sexual activity: Not on file   Lifestyle    Physical activity:     Days per week: Not on file     Minutes per session: Not on file    Stress: Not on file   Relationships    Social connections:     Talks on phone: Not on file     Gets together: Not on file     Attends [employer] service: Not on file     Active member of club or organization: Not on file     Attends meetings of clubs or organizations: Not on file     Relationship status: Not on file    Intimate partner violence:     Fear of current or ex partner: Not on file     Emotionally abused: Not on file     Physically abused: Not on file     Forced sexual activity: Not on file   Other Topics Concern    Not on file   Social History Narrative    Not on file       Past Medical History:   Diagnosis Date    Allergic rhinitis, cause unspecified     Back pain     lumbar    [address] obstruction ([facility] [address]     history of due to scar tissue, resolved non-surgically    CAD (coronary artery disease)     Cellulitis     left leg    Cellulitis 2017    leg left leg/bug bite    Diverticulosis of colon (without mention of hemorrhage)     GERD (gastroesophageal reflux disease)     History of MI (myocardial infarction) 2005    thought due to a blood clot    History of ovarian cyst 1970    had oopherectomy    History of peritonitis 1968    due to ruptured appendix age 13    HTN (hypertension)     Hx of blood clots     right leg    Hyperlipidemia     Intestinal or peritoneal adhesions with obstruction (postoperative) (postinfection) ([address]     Kidney infection     renal failure/sepsis/spider bite    Lateral epicondylitis  of elbow     Muscle strain     right posterior shoulder    Other abnormal glucose     Restless legs syndrome (RLS)     Vitamin D deficiency     Wears glasses      Past Surgical History:   Procedure Laterality Date    ABDOMEN SURGERY  1976    benign tumor removed near remaining overy, 1.5 pounds    APPENDECTOMY  1968    appendix ruptured, developed peritonitis    BUNIONECTOMY Left     along with calcium deposits removed   [address]  2005    negative    COLECTOMY  1969    12 INCHES REMOVED D/T OBSTRUCTION    COLONOSCOPY      CYST REMOVAL Right     right facial    HYSTERECTOMY  1973    taken as a result of recurring cysts    OTHER SURGICAL HISTORY  8/14/14    FESS    OVARY REMOVAL  1970    UNILATERAL due to cyst    OVARY REMOVAL  1971    partial, due to cyst    SINUS SURGERY  2004    WRIST FRACTURE SURGERY Left 3/5/2019    WRIST OPEN REDUCTION INTERNAL FIXATION performed by Della [facility]alla MD at [facility] History   Problem Relation Age of Onset    Stroke Mother     Diabetes Mother     Heart Disease Mother     High Blood Pressure Mother     Heart Disease Father     Heart Disease Brother     High Blood Pressure Brother     Heart Disease Maternal Grandmother     High Blood Pressure Sister            Orders Placed This Encounter   Procedures    XR WRIST LEFT (2 VIEWS)     Standing Status:   Future     Number of Occurrences:   1     Standing Expiration Date:   4/10/2020   [address]     Referral Priority:

## 2019-04-12 ENCOUNTER — HOSPITAL ENCOUNTER (OUTPATIENT)
Dept: PAIN MANAGEMENT | Age: 69
Discharge: HOME | End: 2019-04-12
Payer: MEDICARE

## 2019-04-12 VITALS
SYSTOLIC BLOOD PRESSURE: 143 MMHG | DIASTOLIC BLOOD PRESSURE: 59 MMHG | BODY MASS INDEX: 30.73 KG/M2 | HEIGHT: 64 IN | HEART RATE: 68 BPM | TEMPERATURE: 98.1 F | WEIGHT: 180 LBS | OXYGEN SATURATION: 97 % | RESPIRATION RATE: 16 BRPM

## 2019-04-12 DIAGNOSIS — M51.36 DDD (DEGENERATIVE DISC DISEASE), LUMBAR: ICD-10-CM

## 2019-04-12 DIAGNOSIS — M51.36 LUMBAR DEGENERATIVE DISC DISEASE: Primary | ICD-10-CM

## 2019-04-12 DIAGNOSIS — M47.816 SPONDYLOSIS OF LUMBAR REGION WITHOUT MYELOPATHY OR RADICULOPATHY: ICD-10-CM

## 2019-04-12 DIAGNOSIS — M47.816 FACET ARTHRITIS OF LUMBAR REGION: ICD-10-CM

## 2019-04-12 DIAGNOSIS — M47.816 LUMBAR FACET ARTHROPATHY: ICD-10-CM

## 2019-04-12 DIAGNOSIS — Z51.81 ENCOUNTER FOR MEDICATION MONITORING: ICD-10-CM

## 2019-04-12 DIAGNOSIS — M16.11 PRIMARY OSTEOARTHRITIS OF RIGHT HIP: ICD-10-CM

## 2019-04-12 DIAGNOSIS — M46.1 SACROILIITIS (HCC): ICD-10-CM

## 2019-04-12 DIAGNOSIS — M48.062 NEUROGENIC CLAUDICATION DUE TO LUMBAR SPINAL STENOSIS: ICD-10-CM

## 2019-04-12 DIAGNOSIS — Z51.81 MEDICATION MONITORING ENCOUNTER: ICD-10-CM

## 2019-04-12 DIAGNOSIS — G57.10 MERALGIA PARESTHETICA, UNSPECIFIED LATERALITY: ICD-10-CM

## 2019-04-12 PROCEDURE — 99213 OFFICE O/P EST LOW 20 MIN: CPT

## 2019-04-12 PROCEDURE — 99213 OFFICE O/P EST LOW 20 MIN: CPT | Performed by: NURSE PRACTITIONER

## 2019-04-12 RX ORDER — ACETAMINOPHEN 500 MG
500 TABLET ORAL EVERY 6 HOURS PRN
Status: ON HOLD | COMMUNITY
End: 2019-08-26 | Stop reason: HOSPADM

## 2019-04-12 RX ORDER — HYDROCODONE BITARTRATE AND ACETAMINOPHEN 5; 325 MG/1; MG/1
1 TABLET ORAL EVERY 12 HOURS PRN
Qty: 60 TABLET | Refills: 0 | Status: SHIPPED | OUTPATIENT
Start: 2019-04-17 | End: 2019-05-14

## 2019-04-12 ASSESSMENT — ENCOUNTER SYMPTOMS
EYES NEGATIVE: 1
BACK PAIN: 1
GASTROINTESTINAL NEGATIVE: 1
RESPIRATORY NEGATIVE: 1

## 2019-04-12 NOTE — PROGRESS NOTES
Patient denies any side effects and reports adequate analgesia. No sign of misuse/abuse. When was thelast UDS: 10-4-18            Was the UDS appropriate:yes        Record/Diagnostics Review:       As above, I did review the imaging     10/9/2018  2:44 PM - Luciano, Melbapn Incoming Lab Results From "[facility]"      Component Value Ref Range & Units Status Collected Lab   Pain Management Drug Panel Interp, Urine Consistent    Final 10/04/2018  9:25 AM [facility]   (NOTE)   ________________________________________________________________   DRUGS EXPECTED:   Jorje Wang (HYDROCODONE) [10/3/18]   ________________________________________________________________   CONSISTENT with medications provided:   Jorje Wang (HYDROCODONE) : based on hydrocodone, norhydrocodone   ________________________________________________________________   Drugs Not Included in this Assay:   Acetaminophen   ________________________________________________________________   INTERPRETIVE INFORMATION: Pain Mgt [place], [place] Spec/EMIT, Ur,                            Interp   Interpretation depends on accuracy and completeness of patient   medication information submitted by client. 6-Acetylmorphine, Ur Not Detected    Final 10/04/2018  9:25 AM [facility]   7-Aminoclonazepam, Urine Not Detected    Final 10/04/2018  9:25 AM [facility]   Alpha-OH-Alpraz, Urine Not Detected    Final 10/04/2018  9:25 AM [facility]   Alprazolam, Urine Not Detected    Final 10/04/2018  9:25 AM [facility]   Amphetamines, urine Not Detected    Final 10/04/2018  9:25 AM [facility]   Barbiturates, Ur Not Detected    Final 10/04/2018  9:25 AM [facility]   Benzoylecgonine, Ur Not Detected    Final 10/04/2018  9:25 AM [facility]   Buprenorphine Urine Not Detected    Final 10/04/2018  9:25 AM [facility]   Carisoprodol, Ur Not Detected    Final 10/04/2018  9:25 AM [facility]   (NOTE)   The carisoprodol immunoassay has cross-reactivity to carisoprodol   and meprobamate.     Clonazepam, Urine Not Detected    Final 10/04/2018  9:25 AM [facility]   Codeine, are not intended to be used as the sole means for   clinical diagnosis or patient management decisions. EER Hi Res Interp Ur See Note    Final 10/04/2018  9:25 AM [facility]   (NOTE)   Access [facility] Enhanced Report using either link below:   -Direct access: [email or web address]   -Enter Username, Password: [email or web address]   Username: [email or web address]   Password: [email or web address]   Performed by [facility] [address] [address] [phone]   www. Carman Seip, MD, Lab.  Director                    Past Medical History:   Diagnosis Date    Allergic rhinitis, cause unspecified     Back pain     lumbar    Bowel obstruction (HCC)     history of due to scar tissue, resolved non-surgically    CAD (coronary artery disease)     Cellulitis     left leg    Cellulitis 2017 August    leg left leg/bug bite    Diverticulosis of colon (without mention of hemorrhage)     GERD (gastroesophageal reflux disease)     History of MI (myocardial infarction) 2005    thought due to a blood clot    History of ovarian cyst 1970    had oopherectomy    History of peritonitis 1968    due to ruptured appendix age 13    HTN (hypertension)     Hx of blood clots     right leg    Hyperlipidemia     Intestinal or peritoneal adhesions with obstruction (postoperative) (postinfection) ([address]     Kidney infection     renal failure/sepsis/spider bite    Lateral epicondylitis  of elbow     Muscle strain     right posterior shoulder    Other abnormal glucose     Restless legs syndrome (RLS)     Vitamin D deficiency     Wears glasses        Past Surgical History:   Procedure Laterality Date    ABDOMEN SURGERY  1976    benign tumor removed near remaining overy, 1.5 pounds    APPENDECTOMY  1968    appendix ruptured, developed peritonitis    BUNIONECTOMY Left     along with calcium deposits removed   [address]  2005    negative   [address]    12 INCHES REMOVED D/T OBSTRUCTION    COLONOSCOPY      CYST REMOVAL Right     right facial    HYSTERECTOMY  1973    taken as a result of recurring cysts    OTHER SURGICAL HISTORY  8/14/14    FESS    OVARY REMOVAL  1970    UNILATERAL due to cyst    OVARY REMOVAL  1971    partial, due to cyst    SINUS SURGERY  2004    WRIST FRACTURE SURGERY Left 3/5/2019    WRIST OPEN REDUCTION INTERNAL FIXATION performed by Claudetta Berke, MD at [address] [place] [address]   Allergen Reactions    Bactrim [Sulfamethoxazole-Trimethoprim] Other (See Comments)     sepsis    Codeine Itching    Seasonal          Current Outpatient Medications:     acetaminophen (TYLENOL) 500 MG tablet, Take 500 mg by mouth every 6 hours as needed for Pain, Disp: , Rfl:     [START ON 4/17/2019] HYDROcodone-acetaminophen (NORCO) 5-325 MG per tablet, Take 1 tablet by mouth every 12 hours as needed for Pain for up to 30 days. , Disp: 60 tablet, Rfl: 0    fenofibrate micronized (LOFIBRA) 134 MG capsule, Take 1 capsule by mouth every morning (before breakfast), Disp: 90 capsule, Rfl: 3    lisinopril (PRINIVIL;ZESTRIL) 40 MG tablet, Take 1 tablet by mouth daily, Disp: 90 tablet, Rfl: 3    citalopram (CELEXA) 10 MG tablet, Take 1 tablet by mouth daily, Disp: 90 tablet, Rfl: 3    spironolactone (ALDACTONE) 25 MG tablet, Take 1 tablet by mouth daily, Disp: 90 tablet, Rfl: 3    carvedilol (COREG) 12.5 MG tablet, Take 1 tablet by mouth 2 times daily, Disp: 180 tablet, Rfl: 3    gabapentin (NEURONTIN) 300 MG capsule, Take 1 capsule by mouth nightly for 180 days. , Disp: 90 capsule, Rfl: 1    cyanocobalamin 1000 MCG/ML injection, Inject 1 mL into the muscle once for 1 dose, Disp: 1 mL, Rfl: 0    lidocaine (XYLOCAINE) 5 % ointment, 4-5 times a day to your right thigh for pain., Disp: 240 g, Rfl: 3    furosemide (LASIX) 40 MG tablet, Take 1 tablet by mouth daily, Disp: 90 tablet, Rfl: 2    famotidine (PEPCID) 20 MG tablet, Take 1 tablet by mouth 2 times daily, Disp: 180 tablet, Rfl: 3    metFORMIN (GLUCOPHAGE) 1000 MG tablet, Take 1 tablet by mouth daily (with breakfast), Disp: 90 tablet, Rfl: 3    pramipexole (MIRAPEX) 1 MG tablet, Take 1.5 tablets by mouth daily, Disp: 145 tablet, Rfl: 3    atorvastatin (LIPITOR) 80 MG tablet, Take 1 tablet by mouth nightly, Disp: 90 tablet, Rfl: 3    hydrALAZINE (APRESOLINE) 100 MG tablet, Take 1 tablet by mouth every 8 hours, Disp: 90 tablet, Rfl: 3    vitamin D (CHOLECALCIFEROL) 1000 UNIT TABS tablet, Take 1 tablet by mouth daily, Disp: 90 tablet, Rfl: 3    Calcium Carbonate-Vitamin D (CALCIUM 500/D) 500-125 MG-UNIT TABS, Take 1 tablet by mouth 2 times daily , Disp: , Rfl:     ibuprofen (IBU) 600 MG tablet, Take 1 tablet by mouth every 6 hours as needed for Pain, Disp: 30 tablet, Rfl: 0    apixaban (ELIQUIS) 5 MG TABS tablet, Take 1 tablet by mouth 2 times daily, Disp: 180 tablet, Rfl: 3    nitroGLYCERIN (NITROSTAT) 0.4 MG SL tablet, Place 1 tablet under the tongue every 5 minutes as needed for Chest pain, Disp: 75 tablet, Rfl: 3    cyclobenzaprine (FLEXERIL) 10 MG tablet, Take 1 tablet by mouth 3 times daily as needed for Muscle spasms, Disp: 270 tablet, Rfl: 3    Family History   Problem Relation Age of Onset    Stroke Mother     Diabetes Mother     Heart Disease Mother     High Blood Pressure Mother     Heart Disease Father     Heart Disease Brother     High Blood Pressure Brother     Heart Disease Maternal Grandmother     High Blood Pressure Sister        Social History     Socioeconomic History    Marital status:      Spouse name: Not on file    Number of children: Not on file    Years of education: Not on file    Highest education level: Not on file   Occupational History    Occupation: retired   Social Needs    Financial resource strain: Not on file    Food insecurity:     Worry: Not on file     Inability: Not on file   [employer] needs:     Medical: Not on file     Non-medical: Not on file   Tobacco Use    Smoking status: Former Smoker     Packs/day: 0.50     Years: 20.00     Pack years: 10.00     Types: Cigarettes     Start date: 1995     Last attempt to quit: 2017     Years since quittin.8    Smokeless tobacco: Never Used    Tobacco comment: 18 quit 10 days ago   Substance and Sexual Activity    Alcohol use: No     Alcohol/week: 0.0 oz    Drug use: No    Sexual activity: Not on file   Lifestyle    Physical activity:     Days per week: Not on file     Minutes per session: Not on file    Stress: Not on file   Relationships    Social connections:     Talks on phone: Not on file     Gets together: Not on file     Attends Jainism service: Not on file     Active member of club or organization: Not on file     Attends meetings of clubs or organizations: Not on file     Relationship status: Not on file    Intimate partner violence:     Fear of current or ex partner: Not on file     Emotionally abused: Not on file     Physically abused: Not on file     Forced sexual activity: Not on file   Other Topics Concern    Not on file   Social History Narrative    Not on file       Review of Systems:  Review of Systems   Constitution: Negative. HENT: Negative. Eyes: Negative. Cardiovascular: Negative. Respiratory: Negative. Endocrine:        Blood sugar last one was 108   Hematologic/Lymphatic: Bruises/bleeds easily. Skin: Negative. Musculoskeletal: Positive for back pain and joint pain. Gastrointestinal: Negative. Genitourinary: Positive for nocturia. Neurological: Positive for loss of balance. Psychiatric/Behavioral: Positive for depression. Mild         Physical Exam:  BP (!) 143/59   Pulse 68   Temp 98.1 °F (36.7 °C) (Oral)   Resp 16   Ht 5' 4\" (1.626 m)   Wt 180 lb (81.6 kg)   SpO2 97%   BMI 30.90 kg/m²     Physical Exam   Constitutional: She appears well-developed. overweight   HENT:   Head: Normocephalic.    Neck: Normal range of motion. Neck supple. Pulmonary/Chest: Effort normal.   Musculoskeletal:        Left wrist: She exhibits decreased range of motion, tenderness and swelling. Lumbar back: She exhibits decreased range of motion and tenderness. Arms:  Tender lumbar paraspinal muscles and si joints   Neurological: She is alert. Reflex Scores:       Patellar reflexes are 1+ on the right side and 1+ on the left side. Achilles reflexes are 1+ on the right side and 1+ on the left side. Skin: Skin is warm, dry and intact. Psychiatric: She has a normal mood and affect.  Her speech is normal and behavior is normal. Judgment and thought content normal. Cognition and memory are normal.         Assessment:  Problem List Items Addressed This Visit     Spondylosis of lumbar region without myelopathy or radiculopathy    Relevant Medications    acetaminophen (TYLENOL) 500 MG tablet    HYDROcodone-acetaminophen (NORCO) 5-325 MG per tablet (Start on 4/17/2019)    Sacroiliitis (HCC)    Relevant Medications    acetaminophen (TYLENOL) 500 MG tablet    HYDROcodone-acetaminophen ([address]) 5-325 MG per tablet (Start on 4/17/2019)    Neurogenic claudication due to lumbar spinal stenosis    Relevant Medications    acetaminophen (TYLENOL) 500 MG tablet    HYDROcodone-acetaminophen (NORCO) 5-325 MG per tablet (Start on 4/17/2019)    Meralgia paresthetica    Lumbar facet arthropathy    Facet arthritis of lumbar region [facility])    Relevant Medications    acetaminophen (TYLENOL) 500 MG tablet    HYDROcodone-acetaminophen ([address]) 5-325 MG per tablet (Start on 4/17/2019)    Encounter for medication monitoring    Degenerative joint disease (DJD) of hip    Relevant Medications    acetaminophen (TYLENOL) 500 MG tablet    HYDROcodone-acetaminophen ([address]) 5-325 MG per tablet (Start on 4/17/2019)    DDD (degenerative disc disease), lumbar - Primary    Relevant Medications    acetaminophen (TYLENOL) 500 MG tablet HYDROcodone-acetaminophen (NORCO) 5-325 MG per tablet (Start on 4/17/2019)      Other Visit Diagnoses     Medication monitoring encounter                Treatment Plan:  DISCUSSION: Treatment options discussed withpatient and all questions answered to patient's satisfaction. Possible side effects, risk of tolerance and or dependence and alternative treatments discussed    Obtaining appropriate analgesic effect of treatment   No signs of potential drug abuse or diversion identified    [x] Ill effects of being on chronic pain medications such as sleep disturbances, respiratory depression, hormonal changes, withdrawal symptoms, chronic opioid dependence and tolerance as well as risk of taking opioids with Benzodiazepines and taking opioids along with alcohol,  werediscussed with patient. I had asked the patient to minimize medication use and utilize pain medications only for uncontrolled rest pain or pain with exertional activities. I advised patient not to self-escalate painmedications without consulting with us. At each of patient's future visits we will try to taper pain medications, while adjusting the adjunct medications, and re-evaluating for Physical Therapy to improve spinal andjoint strength. We will continue to have discussions to decrease pain medications as tolerated. Counseled patient on effects their pain medication and /or their medical condition mayhave on their  ability to drive or operate machinery. Instructed not to drive or operate machinery if drowsy     I also discussed with the patient regarding the dangers of combining narcotic pain medication with tranquilizers, alcohol or illegal drugs or taking the medication any way other than prescribed. The dangers were discussed  including respiratory depression and death. Patient was told to tell  all  physicians regarding the medications he is getting from pain clinic.  Patient is warned not to take any unprescribed medications

## 2019-04-16 ENCOUNTER — HOSPITAL ENCOUNTER (OUTPATIENT)
Dept: PHYSICAL THERAPY | Age: 69
Setting detail: THERAPIES SERIES
Discharge: HOME | End: 2019-04-16
Payer: MEDICARE

## 2019-04-16 PROCEDURE — 97110 THERAPEUTIC EXERCISES: CPT

## 2019-04-16 PROCEDURE — 97162 PT EVAL MOD COMPLEX 30 MIN: CPT

## 2019-04-16 ASSESSMENT — PAIN DESCRIPTION - PAIN TYPE: TYPE: SURGICAL PAIN

## 2019-04-16 ASSESSMENT — 9 HOLE PEG TEST
TESTTIME_SECONDS: 24
TESTTIME_SECONDS: 35

## 2019-04-16 ASSESSMENT — PAIN DESCRIPTION - FREQUENCY: FREQUENCY: CONTINUOUS

## 2019-04-16 ASSESSMENT — PAIN SCALES - GENERAL: PAINLEVEL_OUTOF10: 4

## 2019-04-16 ASSESSMENT — PAIN DESCRIPTION - PROGRESSION: CLINICAL_PROGRESSION: GRADUALLY IMPROVING

## 2019-04-16 ASSESSMENT — PAIN DESCRIPTION - LOCATION: LOCATION: WRIST;HAND

## 2019-04-16 ASSESSMENT — PAIN DESCRIPTION - ORIENTATION: ORIENTATION: LEFT

## 2019-04-16 NOTE — PROGRESS NOTES
Physical Therapy  Initial Assessment  Date: 2019  Patient Name: Mel Khan  MRN: [id number]  : 1950     Treatment Diagnosis: Pain, edema, decreased ROM/strength/ coordination/ function of L wrist/ hand following wrist fracture    Restrictions  Restrictions/Precautions  Restrictions/Precautions: (use wrist brace with strenuous activities)    Subjective   General  Additional Pertinent Hx: xx  Referring Practitioner: Dr. Edward Willoughby  Referral Date : 04/10/19  Diagnosis: Closed fracture of L wrist S62.102D  Follows Commands: Within Functional Limits  Other (Comment): 's appt: 5/15/19  General Comment  Comments: Dee Flor at home and broke L wrist. Had surgery done last 3/5/19 and splinted for about 2 weeks . PT Visit Information  Onset Date: 19  PT Insurance Information: Medicare/ [employer]  Total # of Visits Approved: 12  Total # of Visits to Date: 1  No Show: 0  Canceled Appointment: 0  Subjective  Subjective: Complains of pain, stiffness on L wrist/ hand, can't feed self as she's L handed. Pain Screening  Patient Currently in Pain: Yes  Pain Assessment  Pain Assessment: 0-10  Pain Level: 4(can go up to 8/10 with use )  Pain Type: Surgical pain  Pain Location: Wrist;Hand(Thumb)  Pain Orientation: Left  Pain Descriptors: Aching;Tightness; Shooting;Constant  Pain Frequency: Continuous  Clinical Progression: Gradually improving  Non-Pharmaceutical Pain Intervention(s): Cold applied;Elevation; Rest  Response to Pain Intervention: Patient Satisfied  Vital Signs  Patient Currently in Pain: Yes  Social/Functional History  Social/Functional History  Lives With: Spouse  Type of Home: House  Home Layout: One level; Laundry in basement  Home Access: Stairs to enter without rails  Entrance Stairs - Number of Steps: 1  Bathroom Equipment: Grab bars in shower  ADL Assistance: Independent(able to wash hair but can't use curling iron yet)  Homemaking Responsibilities: Yes(able to do chores with mostly the R hand,  helps with laundry)  Active : Yes  Occupation: Retired    Objective   AROM RUE (degrees)  RUE AROM : WNL  AROM LUE (degrees)  LUE AROM : Exceptions  L Shoulder Flexion 0-180: WFL  L Elbow Flexion 0-145: WFL, slow movement  L Elbow Extension 145-0: WFL, pain/ difficulty going into extension  L Forearm Pron 0-90: WFL  L Forearm Supination  0-90: 0 / 15  L Wrist Flexion 0-80: 0 / 45  L Wrist Extension 0-70: 0 / 35 with pain *  L Wrist Radial Deviation 0-20: 0 / 15  L Wrist Ulnar Deviation 0-45: 0 / 20  Left Hand AROM (degrees)  Left Hand AROM: Exceptions  Left Hand General AROM: Composite flexion: Distance from fingertip to [facility] [place]: Index 3 cm Middle: 3 cm Ring 1.5 cm Little: 1 cm  L Thumb MCP 0-50: 0 / 45  L Thumb IP 0-80: 0 / 60  L Thumb Radial ADduction/ABduction 0-55: 0 / 30  L Thumb Palmar ADduction/ABduction 0-45: 0 / 45  L Thumb Opposition: WNL to each digit    Strength LUE  Comment: DEFER      Exercises  Exercise 1: INstructed in edema control, HEP   Hand Dominance  Hand Dominance: Left  Left Hand Strength -  (lbs)  Handle Setting 2: DEFER  Left Hand Strength - Pinch (lbs)  Lateral: DEFER  LUE Edema - Circumference (cm)  LUE Edema Present?: Yes  Wrist Crease: R: 15.6 cm  L 18.2 cm  Distal Palmar Crease: R: 18.7 cm  L: 19.1 cm  L  Thumb IP: R: 6 cm   L 6.5 cm  LUE Edema Present?: Yes  Wrist Crease: R: 15.6 cm  L 18.2 cm  Distal Palmar Crease: R: 18.7 cm  L: 19.1 cm  L  Thumb IP: R: 6 cm   L 6.5 cm  Fine Motor Skills  Left 9 Hole Peg Test Time (secs): 35  Right 9 Hole Peg Test Time (secs): 24  Assessment   Conditions Requiring Skilled Therapeutic Intervention  Body structures, Functions, Activity limitations: Decreased functional mobility ; Decreased ADL status; Decreased ROM; Decreased strength; Increased Pain;Decreased coordination  Assessment: Other problem: Edema. Patient will benefit from manual therapy, therapeutic exercises, pain modalities.   Treatment Diagnosis: Pain, edema, decreased ROM/strength/ coordination/ function of L wrist/ hand following wrist fracture  Prognosis: Good  Decision Making: Medium Complexity  History: History of falls  Exam: Pain, edema circumferential measurements, ROM, 9 hole peg test for coordination, QUICK DASH  Clinical Presentation: Evolving  Patient Education: Instructed on edema control, scar massage with demo, HEP with written instructions. Copy on file. Good understanding  Barriers to Learning: None  REQUIRES PT FOLLOW UP: Yes  Treatment Initiated : Instruction in HEP  Discharge Recommendations: Continue to assess pending progress  Activity Tolerance  Activity Tolerance: Patient limited by pain   Plan   Plan  Times per week: 2-3x/wk for 12 visits  Specific instructions for Next Treatment: Initiate retrograde/ scar massage, manual therapy  Current Treatment Recommendations: Strengthening, ROM, Manual Therapy - Joint Manipulation, Patient/Caregiver Education & Training, Neuromuscular Re-education, Modalities, Manual Therapy - Soft Tissue Mobilization, Home Exercise Program, Safety Education & Training  Plan Comment: Initiate treatment plan    OutComes Score   QuickDASH Total Score: 42       QuickDASH Disability/Symptom Score : 70.45 %  Goals  Short term goals  Time Frame for Short term goals: 6 visits  Short term goal 1: Decrease pain with movement to 2/10  Short term goal 2: Decrease edema by 0.5 cm to decrease feeling of tightness  Short term goal 3: Increase ROM by 10 degrees or better in restricted planes to be able to feed self, curl hair  Short term goal 4: Improve coordination by 5-10 sec  Short term goal 5:  Independent with HEP  Long term goals  Time Frame for Long term goals : 12 visits  Long term goal 1: Be able to make a full fist to grasp objects better  Long term goal 2: Set up strength goals for / prehension when appropriate  Long term goal 3: Improve functional score in QUICK DASH by 10 points or better from 42 = 70.45 disability ( 0= no disability )  Patient Goals   Patient goals : \" To get my hand better \"   Therapy Time   Individual Concurrent Group Co-treatment   Time In 1535         Time Out 1630         Minutes 55         Timed Code Treatment Minutes: 15 Minutes     Treatment Charges: Minutes Units   []  Ultrasound     []  Electrical-Stim     []  Iontophoresis     []  Traction     []  Massage       [x]  Eval 40 1   []  Gait     [x]  Ther Exercise 15  1    []  Manual Therapy       []  Ther Activities       []  Aquatics     []  Vasopneumatic Device     []  Neuro Re-Ed       []  Other       Total Treatment Time: 54 [facility], PT Electronically signed by Connor Rogers PT,T on 4/16/2019 at 4:44 PM       Physician Signature:________________________________Date:__________________  By signing above, I have reviewed this plan of care and certify a need for medically   necessary rehabilitation services

## 2019-04-17 ENCOUNTER — HOSPITAL ENCOUNTER (OUTPATIENT)
Dept: PHYSICAL THERAPY | Age: 69
Setting detail: THERAPIES SERIES
Discharge: HOME | End: 2019-04-17
Payer: MEDICARE

## 2019-04-17 PROCEDURE — 97140 MANUAL THERAPY 1/> REGIONS: CPT

## 2019-04-17 PROCEDURE — 97110 THERAPEUTIC EXERCISES: CPT

## 2019-04-17 ASSESSMENT — PAIN DESCRIPTION - PAIN TYPE: TYPE: SURGICAL PAIN

## 2019-04-17 ASSESSMENT — PAIN SCALES - GENERAL: PAINLEVEL_OUTOF10: 5

## 2019-04-17 ASSESSMENT — PAIN DESCRIPTION - LOCATION: LOCATION: WRIST;HAND

## 2019-04-17 ASSESSMENT — PAIN DESCRIPTION - PROGRESSION: CLINICAL_PROGRESSION: GRADUALLY IMPROVING

## 2019-04-17 NOTE — PROGRESS NOTES
[facility]   Outpatient Physical Therapy  [address] [place] [address]. [address]  Phone: [phone]  Fax: [phone]    Daily Progress Note    Date: 19    Patient Name: Juancarlos Fried        MRN: [id number]  Account: [de-identified] : 1950      General Information:  Referring Practitioner: Dr. Thomas Pugh  Referral Date : 04/10/19  Diagnosis: Closed fracture of L wrist S62.102D  Follows Commands: Within Functional Limits  Other (Comment): 's appt: 5/15/19  Onset Date: 19  PT Insurance Information: Medicare/ [employer]  Total # of Visits Approved: 12  Total # of Visits to Date: 2  No Show: 0  Canceled Appointment: 0    Subjective:  Subjective: Might have overdone it yesterday, after the evaluation, did exercises at home and vacuumed the floors. Pain:  Patient Currently in Pain: Yes  Pain Assessment: 0-10  Pain Level: 5  Pain Type: Surgical pain  Pain Location: Wrist;Hand(Thumb)  Clinical Progression: Gradually improving       Objective:  Exercise 2: Key pegs  Exercise 3: E-Z exerboard, #2, 10x  Exercise 4: Velcro checkboard, 8 rows  Exercise 5: Retrograde/scar/STM  Exercise 6: AA/PROM L wrist/elbow/hand  Exercise 7: Tendon gliding ex. Exercise 8: Cone stacking, 10x2  Exercise 9: Juxacizer, 4x  Exercise 10: ROM arc, #5, 1 set      Assessment: Body structures, Functions, Activity limitations: Decreased functional mobility ; Decreased ADL status; Decreased ROM; Decreased strength;Decreased coordination; Increased Pain  Assessment: Other problem: Edema. Initiated exercises with pain with movement/ stretches.   Treatment Diagnosis: Pain, edema, decreased ROM/strength/ coordination/ function of L wrist/ hand following wrist fracture  Prognosis: Good  REQUIRES PT FOLLOW UP: Yes  Discharge Recommendations: Continue to assess pending progress   Goals:  Patient Goals:  Patient goals : \" To get my hand better \"  Short Term Goals:  Time Frame for Short term goals: 6 visits  Short term goal 1: Decrease

## 2019-04-18 ENCOUNTER — NURSE ONLY (OUTPATIENT)
Dept: INTERNAL MEDICINE CLINIC | Age: 69
End: 2019-04-18
Payer: MEDICARE

## 2019-04-18 DIAGNOSIS — E53.8 VITAMIN B 12 DEFICIENCY: Primary | ICD-10-CM

## 2019-04-18 PROCEDURE — 96372 THER/PROPH/DIAG INJ SC/IM: CPT | Performed by: INTERNAL MEDICINE

## 2019-04-18 RX ORDER — CYANOCOBALAMIN 1000 UG/ML
1000 INJECTION INTRAMUSCULAR; SUBCUTANEOUS ONCE
Status: COMPLETED | OUTPATIENT
Start: 2019-04-18 | End: 2019-04-18

## 2019-04-18 RX ADMIN — CYANOCOBALAMIN 1000 MCG: 1000 INJECTION INTRAMUSCULAR; SUBCUTANEOUS at 10:05

## 2019-04-18 NOTE — PROGRESS NOTES
After obtaining consent, and per orders of Dr. Lona Ramirez, injection of B12 given in Right deltoid by Lupe Zepeda. Patient instructed to remain in clinic for 20 minutes afterwards, and to report any adverse reaction to me immediately.

## 2019-04-23 ENCOUNTER — HOSPITAL ENCOUNTER (OUTPATIENT)
Dept: PHYSICAL THERAPY | Age: 69
Setting detail: THERAPIES SERIES
Discharge: HOME | End: 2019-04-23
Payer: MEDICARE

## 2019-04-23 PROCEDURE — 97110 THERAPEUTIC EXERCISES: CPT

## 2019-04-23 PROCEDURE — 97140 MANUAL THERAPY 1/> REGIONS: CPT

## 2019-04-23 ASSESSMENT — PAIN DESCRIPTION - ORIENTATION: ORIENTATION: LEFT

## 2019-04-23 ASSESSMENT — PAIN DESCRIPTION - PAIN TYPE: TYPE: SURGICAL PAIN

## 2019-04-23 ASSESSMENT — PAIN DESCRIPTION - LOCATION: LOCATION: WRIST;HAND

## 2019-04-23 ASSESSMENT — PAIN DESCRIPTION - PROGRESSION: CLINICAL_PROGRESSION: GRADUALLY IMPROVING

## 2019-04-23 ASSESSMENT — PAIN SCALES - GENERAL: PAINLEVEL_OUTOF10: 4

## 2019-04-23 NOTE — PROGRESS NOTES
[address]   Outpatient Physical Therapy  [address] [facility]. [address]  Phone: [phone]  Fax: [phone]    Daily Progress Note    Date: 19    Patient Name: Myrtle Fonseca        MRN: [id number]  Account: [de-identified] : 1950      General Information:  Referring Practitioner: Dr. Brock Martinez  Referral Date : 04/10/19  Diagnosis: Closed fracture of L wrist S62.102D  Follows Commands: Within Functional Limits  Other (Comment): 's appt: 5/15/19  Onset Date: 19  PT Insurance Information: Medicare/ [employer]  Total # of Visits Approved: 12  Total # of Visits to Date: 3  No Show: 0  Canceled Appointment: 0    Subjective:  Subjective: States able to eat better using her L hand, able to use hand a lot more with ADL's but gets sore after. Pain:  Patient Currently in Pain: Yes  Pain Assessment: 0-10  Pain Level: 4  Pain Type: Surgical pain  Pain Location: Wrist;Hand  Pain Orientation: Left  Clinical Progression: Gradually improving       Objective:  Exercise 2: Key pegs  Exercise 3: E-Z exerboard, #2, 10x  Exercise 4: Velcro checkboard, 8 rows  Exercise 5: Retrograde/scar/STM  Exercise 6: AA/PROM L wrist/elbow/hand  Exercise 7: Tendon gliding ex. Exercise 8: Cone stacking, 10x2  Exercise 9: Rose Juan Luis, 4x  Exercise 10: ROM arc, #5, 1 set  Exercise 11: BAPS balls, L1-3, 2x@, CW/CCW  Exercise 12: Towel crunches, 5x      Assessment: Body structures, Functions, Activity limitations: Decreased functional mobility ; Decreased ADL status; Decreased ROM; Decreased strength;Decreased balance;Decreased coordination; Increased Pain  Assessment: Other problem: Edema. Noted decreased digital swelling and now able to make a full fist. Tolerating exercises and stretches better. Now able to curl hair.   Treatment Diagnosis: Pain, edema, decreased ROM/strength/ coordination/ function of L wrist/ hand following wrist fracture  Prognosis: Good  REQUIRES PT FOLLOW UP: Yes  Discharge Recommendations: Continue to assess pending progress      Goals:  Patient Goals:  Patient goals : \" To get my hand better \"  Short Term Goals:  Time Frame for Short term goals: 6 visits  Short term goal 1: Decrease pain with movement to 2/10 - ONGOING  Short term goal 2: Decrease edema by 0.5 cm to decrease feeling of tightness -ONGOING  Short term goal 3: Increase ROM by 10 degrees or better in restricted planes to be able to feed self, curl hair -PARTLY MET  Short term goal 4: Improve coordination by 5-10 sec - ONGOING  Short term goal 5: Independent with HEP - MET  Long Term Goals:  Time Frame for Long term goals : 12 visits  Long term goal 1: Be able to make a full fist to grasp objects better - PARTLY MET  Long term goal 2: Set up strength goals for / prehension when appropriate  Long term goal 3: Improve functional score in QUICK DASH by 10 points or better from 42 = 70.45 disability ( 0= no disability ) - ONGOING    Plan:     Times per week: 2-3x/wk for 12 visits  Current Treatment Recommendations: Strengthening;Manual Therapy - Joint Manipulation;Patient/Caregiver Education & Training;ROM;Modalities; Neuromuscular Re-education;Home Exercise Program;Manual Therapy - Soft Tissue Mobilization  Plan Comment: Progress as tolerated    Therapy Time:  Time In: 1110  Time Out: 1155  Minutes: 45  Timed Code Treatment Minutes: 45 Minutes    Treatment Charges: Minutes Units   []  Ultrasound     []  Electrical-Stim     []  Iontophoresis     []  Traction     []  Massage       []  Eval     []  Gait     [x]  Ther Exercise 25  2    [x]  Manual Therapy 20  1    []  Ther Activities       []  Aquatics     []  Neuro Re-Ed       []  Other       Total Treatment Time: 39 3        Ma  Abelardo Faye PT Electronically signed by Tres Orourke PT,T on 4/23/2019 at 12:05 PM

## 2019-04-24 ENCOUNTER — HOSPITAL ENCOUNTER (OUTPATIENT)
Dept: PHYSICAL THERAPY | Age: 69
Setting detail: THERAPIES SERIES
Discharge: HOME | End: 2019-04-24
Payer: MEDICARE

## 2019-04-24 PROCEDURE — 97140 MANUAL THERAPY 1/> REGIONS: CPT

## 2019-04-24 PROCEDURE — 97110 THERAPEUTIC EXERCISES: CPT

## 2019-04-24 ASSESSMENT — PAIN DESCRIPTION - PROGRESSION: CLINICAL_PROGRESSION: GRADUALLY IMPROVING

## 2019-04-24 ASSESSMENT — PAIN SCALES - GENERAL: PAINLEVEL_OUTOF10: 2

## 2019-04-24 ASSESSMENT — PAIN DESCRIPTION - LOCATION: LOCATION: HAND;WRIST

## 2019-04-24 ASSESSMENT — PAIN DESCRIPTION - ORIENTATION: ORIENTATION: LEFT

## 2019-04-24 ASSESSMENT — PAIN DESCRIPTION - PAIN TYPE: TYPE: SURGICAL PAIN

## 2019-04-24 NOTE — PROGRESS NOTES
Physical Therapy  Progress Note  Date: 2019  Patient Name: Peace Saleh  MRN: [id number]     :   1950    Subjective:   General  Referring Practitioner: Dr. Nasreen Cooper  PT Visit Information  Onset Date: 19  PT Insurance Information: Medicare/   Total # of Visits Approved: 12  Total # of Visits to Date: 4  No Show: 0  Canceled Appointment: 0  Subjective  Subjective: Reports able to do some work in the yard yesterday but wore her brace  Pain Screening  Patient Currently in Pain: Yes  Pain Assessment  Pain Assessment: 0-10  Pain Level: 2  Pain Type: Surgical pain  Pain Location: Hand;Wrist  Pain Orientation: Left  Clinical Progression: Gradually improving  Vital Signs  Patient Currently in Pain: Yes       Treatment Activities:    AROM LUE (degrees)  L Forearm Supination  0-90: 0 / 15 ( NOW 35 )  L Wrist Flexion 0-80: 0 / 45 ( NOW 55 )  L Wrist Extension 0-70: 0 / 35 with pain * ( NOW 35 )  L Wrist Radial Deviation 0-20: 0 / 15 ( NOW 25 )  L Wrist Ulnar Deviation 0-45: 0 / 20 ( NOW 25 )  Left Hand AROM (degrees)  Left Hand General AROM: Composite flexion: Distance from fingertip to [facility] [place]: Index 3 cm  ( NOW 2.5 CM )Middle: 3 cm (NOW 2 CM ) Ring 1.5 cm Little: 1 cm   Exercises  Exercise 2: Key pegs  Exercise 3: E-Z exerboard, #2, 10x  Exercise 4: Velcro checkboard, 8 rows  Exercise 5: Retrograde/scar/STM  Exercise 6: AA/PROM L wrist/elbow/hand  Exercise 7: Tendon gliding ex. Exercise 8: Cone stacking, 10x2  Exercise 9: Dulcie Martinis, 4x  Exercise 10: ROM arc, #5, 1 set  Exercise 11: BAPS balls, L1-3, 2x@, CW/CCW  Exercise 12: Towel crunches, 5x   Assessment:   Conditions Requiring Skilled Therapeutic Intervention  Body structures, Functions, Activity limitations: Decreased functional mobility ; Decreased ADL status; Decreased ROM; Decreased strength;Decreased balance;Decreased coordination; Increased Pain  Assessment: Other problem: Edema.   Noted improved ROM of L wrist/ fingers except for wrist extension. Meeting 1/5 STG, 0/3 LTG. Treatment Diagnosis: Pain, edema, decreased ROM/strength/ coordination/ function of L wrist/ hand following wrist fracture  Prognosis: Good  REQUIRES PT FOLLOW UP: Yes  Discharge Recommendations: Continue to assess pending progress    Goals:  Short term goals  Time Frame for Short term goals: 6 visits  Short term goal 1: Decrease pain with movement to 2/10 - PARTLY MET  Short term goal 2: Decrease edema by 0.5 cm to decrease feeling of tightness -ONGOING  Short term goal 3: Increase ROM by 10 degrees or better in restricted planes to be able to feed self, curl hair -PARTLY MET  Short term goal 4: Improve coordination by 5-10 sec - ONGOING  Short term goal 5:  Independent with HEP - MET  Long term goals  Time Frame for Long term goals : 12 visits  Long term goal 1: Be able to make a full fist to grasp objects better - PARTLY MET  Long term goal 2: Set up strength goals for / prehension when appropriate  Long term goal 3: Improve functional score in QUICK DASH by 10 points or better from 42 = 70.45 disability ( 0= no disability ) - ONGOING  Patient Goals   Patient goals : \" To get my hand better \"    Plan:    Plan  Times per week: 2-3x/wk for 12 visits  Current Treatment Recommendations: Strengthening, Manual Therapy - Joint Manipulation, Patient/Caregiver Education & Training, ROM, Modalities, Neuromuscular Re-education, Home Exercise Program, Manual Therapy - Soft Tissue Mobilization  Plan Comment: Progress as tolerated  Timed Code Treatment Minutes: 45 Minutes     Therapy Time   Individual Concurrent Group Co-treatment   Time In 0915         Time Out 1000         Minutes 45         Timed Code Treatment Minutes: 45 Minutes     Treatment Charges: Minutes Units   []  Ultrasound     []  Electrical-Stim     []  Iontophoresis     []  Traction     []  Massage       []  Eval     []  Gait     [x]  Ther Exercise 25 2    [x]  Manual Therapy 20  1    []  Ther Activities       [] Aquatics     []  Vasopneumatic Device     []  Neuro Re-Ed       []  Other       Total Treatment Time: 39 3        Ma  Dain Meza PT Electronically signed by Lady Jackie PT,T on 4/24/2019 at 10:13 AM

## 2019-04-26 ENCOUNTER — HOSPITAL ENCOUNTER (OUTPATIENT)
Dept: PHYSICAL THERAPY | Age: 69
Setting detail: THERAPIES SERIES
Discharge: HOME | End: 2019-04-26
Payer: MEDICARE

## 2019-04-26 PROCEDURE — 97140 MANUAL THERAPY 1/> REGIONS: CPT

## 2019-04-26 PROCEDURE — 97110 THERAPEUTIC EXERCISES: CPT

## 2019-04-26 ASSESSMENT — PAIN DESCRIPTION - LOCATION: LOCATION: HAND;WRIST

## 2019-04-26 ASSESSMENT — PAIN SCALES - GENERAL: PAINLEVEL_OUTOF10: 1

## 2019-04-26 ASSESSMENT — PAIN DESCRIPTION - PROGRESSION: CLINICAL_PROGRESSION: GRADUALLY IMPROVING

## 2019-04-26 ASSESSMENT — PAIN DESCRIPTION - ORIENTATION: ORIENTATION: LEFT

## 2019-04-26 ASSESSMENT — PAIN DESCRIPTION - PAIN TYPE: TYPE: SURGICAL PAIN

## 2019-04-26 NOTE — PROGRESS NOTES
[address] [facility]   Outpatient Physical Therapy  [address] [place] [address]. [address]  Phone: [phone]  Fax: [phone]    Daily Progress Note    Date: 19    Patient Name: Jaylen Calixto        MRN: [id number]  Account: [de-identified] : 1950      General Information:  Referring Practitioner: Dr. Shelly Sorensen  Referral Date : 04/10/19  Diagnosis: Closed fracture of L wrist S62.102D  Follows Commands: Within Functional Limits  Other (Comment): 's appt: 5/15/19  Onset Date: 19  PT Insurance Information: Medicare/ [employer]  Total # of Visits Approved: 12  Total # of Visits to Date: 5  No Show: 0  Canceled Appointment: 0    Subjective:  Subjective: Able to use curling iron better today, pleased with progress     Pain:  Patient Currently in Pain: Yes  Pain Assessment: 0-10  Pain Level: 1  Pain Type: Surgical pain  Pain Location: Hand;Wrist  Pain Orientation: Left  Clinical Progression: Gradually improving       Objective:  Exercise 2: Key pegs  Exercise 3: E-Z exerboard, #2, 10x  Exercise 4: Velcro checkboard, 8 rows  Exercise 5: Retrograde/scar/STM  Exercise 6: AA/PROM L wrist/elbow/hand  Exercise 7: Tendon gliding ex. Exercise 9: Juxacizer, 4x  Exercise 10: ROM arc, #5, 1 set - for pronation/ supination  Exercise 11: BAPS balls, L1-3, 2x@, CW/CCW - simulate opening/ closing jars  Exercise 13: Kinesiotaping of surgical scar, I strip, mechanical correction      Assessment: Body structures, Functions, Activity limitations: Decreased functional mobility ; Decreased ADL status; Decreased ROM; Decreased strength;Decreased coordination; Increased Pain  Assessment: Other problem: Edema. Noted bluish discoloration on L volar forearm with ulnar side more prominent, fluid? which improved after retrograde massage, middle third of surgical scar is tight with decreased mobility. Applied kinesiotape on scar. Continues to report difficulty pinching/ grasping like opening dog's food from a package.   Treatment Other       Total Treatment Time: 39 3        Ma  Alycia Henderson PT Electronically signed by Elida Black PT,CHT on 4/26/2019 at 2:18 PM

## 2019-04-30 ENCOUNTER — HOSPITAL ENCOUNTER (OUTPATIENT)
Dept: PHYSICAL THERAPY | Age: 69
Setting detail: THERAPIES SERIES
Discharge: HOME | End: 2019-04-30
Payer: MEDICARE

## 2019-04-30 PROCEDURE — 97110 THERAPEUTIC EXERCISES: CPT

## 2019-04-30 PROCEDURE — 97140 MANUAL THERAPY 1/> REGIONS: CPT

## 2019-04-30 ASSESSMENT — PAIN DESCRIPTION - PROGRESSION: CLINICAL_PROGRESSION: GRADUALLY IMPROVING

## 2019-04-30 ASSESSMENT — PAIN DESCRIPTION - LOCATION: LOCATION: HAND;WRIST

## 2019-04-30 ASSESSMENT — PAIN SCALES - GENERAL: PAINLEVEL_OUTOF10: 2

## 2019-04-30 ASSESSMENT — PAIN DESCRIPTION - ORIENTATION: ORIENTATION: LEFT

## 2019-05-01 ENCOUNTER — HOSPITAL ENCOUNTER (OUTPATIENT)
Dept: PHYSICAL THERAPY | Age: 69
Setting detail: THERAPIES SERIES
Discharge: HOME | End: 2019-05-01
Payer: MEDICARE

## 2019-05-01 PROCEDURE — 97110 THERAPEUTIC EXERCISES: CPT

## 2019-05-01 PROCEDURE — 97140 MANUAL THERAPY 1/> REGIONS: CPT

## 2019-05-01 ASSESSMENT — PAIN DESCRIPTION - LOCATION: LOCATION: HAND;WRIST

## 2019-05-01 ASSESSMENT — PAIN SCALES - GENERAL: PAINLEVEL_OUTOF10: 2

## 2019-05-01 ASSESSMENT — PAIN DESCRIPTION - PROGRESSION: CLINICAL_PROGRESSION: GRADUALLY IMPROVING

## 2019-05-01 ASSESSMENT — PAIN DESCRIPTION - ORIENTATION: ORIENTATION: LEFT

## 2019-05-01 ASSESSMENT — PAIN DESCRIPTION - PAIN TYPE: TYPE: SURGICAL PAIN

## 2019-05-01 NOTE — PROGRESS NOTES
[address] [facility]   Outpatient Physical Therapy  [address] [place] [address]. [address]  Phone: [phone]  Fax: [phone]    Daily Progress Note    Date: 19    Patient Name: Megan Hollis        MRN: [id number]  Account: [de-identified] : 1950      General Information:  Referring Practitioner: Dr. Lea Riojas  Referral Date : 04/10/19  Diagnosis: Closed fracture of L wrist S62.102D  Follows Commands: Within Functional Limits  Other (Comment): Ngozi appt: 5/15/19  Onset Date: 19  PT Insurance Information: Medicare/ "[employer]  Total # of Visits Approved: 12  Total # of Visits to Date: 7  No Show: 0  Canceled Appointment: 0    Subjective:  Subjective: Reports L wrist / hand doing better but pain persists on thenar region     Pain:  Patient Currently in Pain: Yes  Pain Assessment: 0-10  Pain Level: 2  Pain Type: Surgical pain  Pain Location: Hand;Wrist  Pain Orientation: Left  Clinical Progression: Gradually improving       Objective:  Exercise 2: Key pegs  Exercise 3: E-Z exerboard, #2, 10x  Exercise 4: Velcro checkboard, 8 rows  Exercise 5: Retrograde/scar/STM  Exercise 6: AA/PROM L wrist/elbow/hand  Exercise 7: Tendon gliding ex. Exercise 8: Graded clothespins , yellow to blue  Exercise 9: Juxacizer, 4x  Exercise 10: Power web, red, 10x@  Exercise 11: BAPS balls, L1-3, 2x@, CW/CCW - simulate opening/ closing jars  Exercise 12: Putty with cone, 3 rows  Exercise 14: Flexbar, 6#, 10x@, 4 ways  Exercise 15: Wrist stabilization ex., isometrics      Assessment: Body structures, Functions, Activity limitations: Decreased functional mobility ; Decreased ADL status; Decreased ROM; Decreased strength;Decreased coordination; Increased Pain  Assessment: Other problem: Edema. Tolerated progression to gentle strengthening exercises without increased pain.   Treatment Diagnosis: Pain, edema, decreased ROM/strength/ coordination/ function of L wrist/ hand following wrist fracture  Prognosis: Good     Goals:  Patient

## 2019-05-03 ENCOUNTER — OFFICE VISIT (OUTPATIENT)
Dept: NEUROLOGY | Age: 69
End: 2019-05-03
Payer: MEDICARE

## 2019-05-03 ENCOUNTER — HOSPITAL ENCOUNTER (OUTPATIENT)
Dept: PHYSICAL THERAPY | Age: 69
Setting detail: THERAPIES SERIES
Discharge: HOME | End: 2019-05-03
Payer: MEDICARE

## 2019-05-03 VITALS — TEMPERATURE: 98.6 F | HEART RATE: 65 BPM | DIASTOLIC BLOOD PRESSURE: 80 MMHG | SYSTOLIC BLOOD PRESSURE: 145 MMHG
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
## 2019-05-03 DIAGNOSIS — E53.8 VITAMIN B12 DEFICIENCY: ICD-10-CM

## 2019-05-03 DIAGNOSIS — M79.10 MUSCLE SORENESS: Primary | ICD-10-CM

## 2019-05-03 DIAGNOSIS — G57.11 MERALGIA PARESTHETICA OF RIGHT SIDE: ICD-10-CM

## 2019-05-03 DIAGNOSIS — G62.9 PERIPHERAL POLYNEUROPATHY: ICD-10-CM

## 2019-05-03 DIAGNOSIS — I10 POORLY-CONTROLLED HYPERTENSION: ICD-10-CM

## 2019-05-03 PROCEDURE — 1090F PRES/ABSN URINE INCON ASSESS: CPT | Performed by: PSYCHIATRY & NEUROLOGY

## 2019-05-03 PROCEDURE — 4040F PNEUMOC VAC/ADMIN/RCVD: CPT | Performed by: PSYCHIATRY & NEUROLOGY

## 2019-05-03 PROCEDURE — 97110 THERAPEUTIC EXERCISES: CPT

## 2019-05-03 PROCEDURE — 1036F TOBACCO NON-USER: CPT | Performed by: PSYCHIATRY & NEUROLOGY

## 2019-05-03 PROCEDURE — G8598 ASA/ANTIPLAT THER USED: HCPCS | Performed by: PSYCHIATRY & NEUROLOGY

## 2019-05-03 PROCEDURE — 97140 MANUAL THERAPY 1/> REGIONS: CPT

## 2019-05-03 PROCEDURE — 99215 OFFICE O/P EST HI 40 MIN: CPT | Performed by: PSYCHIATRY & NEUROLOGY

## 2019-05-03 PROCEDURE — 1123F ACP DISCUSS/DSCN MKR DOCD: CPT | Performed by: PSYCHIATRY & NEUROLOGY

## 2019-05-03 PROCEDURE — G8427 DOCREV CUR MEDS BY ELIG CLIN: HCPCS | Performed by: PSYCHIATRY & NEUROLOGY

## 2019-05-03 PROCEDURE — 3017F COLORECTAL CA SCREEN DOC REV: CPT | Performed by: PSYCHIATRY & NEUROLOGY

## 2019-05-03 PROCEDURE — G8417 CALC BMI ABV UP PARAM F/U: HCPCS | Performed by: PSYCHIATRY & NEUROLOGY

## 2019-05-03 PROCEDURE — G8399 PT W/DXA RESULTS DOCUMENT: HCPCS | Performed by: PSYCHIATRY & NEUROLOGY

## 2019-05-03 ASSESSMENT — PAIN DESCRIPTION - PROGRESSION: CLINICAL_PROGRESSION: GRADUALLY IMPROVING

## 2019-05-03 NOTE — PROGRESS NOTES
[address]   Outpatient Physical Therapy  [address] [facility]. [address]  Phone: [phone]  Fax: [phone]    Daily Progress Note    Date: 5/3/19    Patient Name: Demetrius Woods        MRN: [id number]  Account: [de-identified] : 1950      General Information:  Referring Practitioner: Dr. Teressa Carrington  Referral Date : 04/10/19  Diagnosis: Closed fracture of L wrist S62.102D  Follows Commands: Within Functional Limits  Other (Comment): 's appt: 5/15/19  Onset Date: 19  PT Insurance Information: Medicare/ [employer]  Total # of Visits Approved: 12  Total # of Visits to Date: 8  No Show: 0  Canceled Appointment: 0    Subjective:  Subjective: Late from doctor's appointment. Reports she fell last Wednesday at home, missed a step but tried  not to land on her L wrist/ hand so she landed on her L elbow. States no pain on LUE. Pain:  Patient Currently in Pain: Denies  Clinical Progression: Gradually improving       Objective:  Exercise 2: Key pegs  Exercise 4: Velcro checkboard, 8 rows  Exercise 5: Retrograde/scar/STM  Exercise 6: AA/PROM L wrist/elbow/hand  Exercise 8: Graded clothespins , yellow to blue  Exercise 9: Juxacizer, 4x  Exercise 10: Power web, red, 10x@  Exercise 11: BAPS balls, L1-3, 2x@, CW/CCW - simulate opening/ closing jars  Exercise 12: Putty with cone, 3 rows  Exercise 14: Flexbar, 6#, 10x@, 4 ways  Exercise 15: Wrist PRE's, 1# 10-15x@  Exercise 16: Thumb isometrics     Assessment: Body structures, Functions, Activity limitations: Decreased functional mobility ; Decreased ADL status; Decreased ROM; Decreased coordination;Decreased strength; Increased Pain  Assessment: Other problem: Edema. Progressed with light resistance, complained of pain with wrist flexion using 1# weight but able to complete set of reps.   Treatment Diagnosis: Pain, edema, decreased ROM/strength/ coordination/ function of L wrist/ hand following wrist fracture  Prognosis: Good  REQUIRES PT FOLLOW UP: Yes  Discharge Recommendations: Continue to assess pending progress   Goals:  Patient Goals:  Patient goals : \" To get my hand better \"  Short Term Goals:  Time Frame for Short term goals: 6 visits  Short term goal 1: Decrease pain with movement to 2/10 - PARTLY MET  Short term goal 2: Decrease edema by 0.5 cm to decrease feeling of tightness -ONGOING  Short term goal 3: Increase ROM by 10 degrees or better in restricted planes to be able to feed self, curl hair -PARTLY MET  Short term goal 4: Improve coordination by 5-10 sec - ONGOING  Short term goal 5: Independent with HEP - MET  Long Term Goals:  Time Frame for Long term goals : 12 visits  Long term goal 1: Be able to make a full fist to grasp objects better - PARTLY MET  Long term goal 2: Set up strength goals for / prehension when appropriate. NEW GOAL: Improve  strength by 5-10# and prehension by 2#@ to be able to open jars, grasp better  Long term goal 3: Improve functional score in QUICK DASH by 10 points or better from 42 = 70.45 disability ( 0= no disability ) - ONGOING    Plan:     Times per week: 2-3x/wk for 12 visits  Current Treatment Recommendations: Strengthening;Manual Therapy - Joint Manipulation;Patient/Caregiver Education & Training;ROM;Modalities; Neuromuscular Re-education;Manual Therapy - Soft Tissue Mobilization;Home Exercise Program  Plan Comment: Progress as tolerated    Therapy Time:  Time In: 1355  Time Out: 1438  Minutes: 43  Timed Code Treatment Minutes: 40 Minutes    Treatment Charges: Minutes Units   []  Ultrasound     []  Electrical-Stim     []  Iontophoresis     []  Traction     []  Massage       []  Eval     []  Gait 25 2   [x]  Ther Exercise 15  1    [x]  Manual Therapy       []  Ther Activities       []  Aquatics     []  Neuro Re-Ed       []  Other       Total Treatment Time: 36 3        Ma  Ana Bob PT Electronically signed by Mateus Felix PT,CHT on 5/3/2019 at 2:48 PM

## 2019-05-03 NOTE — PROGRESS NOTES
[facility]            [facility], [address]; [place], [address] [facility]    [address], [address], [place], [address] [facility]            Dept: [phone]          Dept Fax: [phone]             Leonetta Bernard, MD Amie Moat, MD Ahmed B. Jackye Mount, MD Kristene Niece, MD Gattis Heck, MD Julianna Severin, [facility] 70 UP NOTE                                          PATIENT NAME: Demetrius Woods   PATIENT MRN: [id number]  FOLLOW UP TODAY: 5/3/2019     Dear Dr. Ziyad Diaz MD,     I had the pleasure of seeing your patient Demetrius Woods, who comes for follow up. CHIEF COMPLAINT: Follow-up (6-8 weeks)     INITIAL & INTERVAL HISTORY:     Demetrius Woods is a 77year old [facility], I saw her on 3/15/2019, pt returns for follow up regarding multiple neurological issues including falls and balance issue. Since last visit, she had one fall, which occurred a few days ago, she missed steps at home, fell on back, hit her head without LOC. The fall in 3/2019, s/p left forearm surgery, she follows with orthopedics. (she fell to wood floor when she was taking of her compressing stockings in soda)     MRI brain done on 4/10/2019, no acute intracranial abnormality. Minimal parenchymal volume loss. minimal chronic microvascular disease; sinus mucosal disease and minimal right mastoid effusion. She said her right leg pain has greatly improved with lidocaine cream since last visit. She brought her doctor visit note dated on 12/13/2018, regarding EMG/NCV, it wrote NCV on right peroneal CMAP, right tibial CMAP, right sural SAP wnl. No real EMG/NCV report was found. Initial clinic visit on 3/15/2019  Right leg (upper thigh lateral and anterior) burning pain 10/10, and numbness for the past 2-3 years, got worse.  Pain occurs daily, 4/10/2019  No acute intracranial abnormality. Minimal parenchymal volume loss. Minimal chronic microvascular disease. Sinus mucosal disease and minimal right mastoid effusion. MRI brain 8/6/2015  1. No findings to suggest an acute infarction. 2. Extensive mucosal thickening within the maxillary sinuses. Suggest clinical correlation for signs of infection. EMG/NCV 1.5 months ago by [facility] neurologist Dr. Diaz Carey   No data available for me to review    PMH/PSH/SH/FMH: Remain unchanged since last visit except those listed in the interval history    Admission on 03/05/2019, Discharged on 03/05/2019   Component Date Value Ref Range Status    POC Glucose 03/05/2019 108* 65 - 105 mg/dL Final    POC Glucose 03/05/2019 108* 65 - 105 mg/dL Final    except those listed in the interval history.        Diagnosis Date    Allergic rhinitis, cause unspecified     Back pain     lumbar    Bowel obstruction (HCC)     history of due to scar tissue, resolved non-surgically    CAD (coronary artery disease)     Cellulitis     left leg    Cellulitis 2017 August    leg left leg/bug bite    Diverticulosis of colon (without mention of hemorrhage)     GERD (gastroesophageal reflux disease)     History of MI (myocardial infarction) 2005    thought due to a blood clot    History of ovarian cyst 1970    had oopherectomy    History of peritonitis 1968    due to ruptured appendix age 13    HTN (hypertension)     Hx of blood clots     right leg    Hyperlipidemia     Intestinal or peritoneal adhesions with obstruction (postoperative) (postinfection) ([address]     Kidney infection     renal failure/sepsis/spider bite    Lateral epicondylitis  of elbow     Muscle strain     right posterior shoulder    Other abnormal glucose     Restless legs syndrome (RLS)     Vitamin D deficiency     Wears glasses         ALLERGIES:   Allergies   Allergen Reactions    Bactrim [Sulfamethoxazole-Trimethoprim] Other (See Comments) sepsis    Codeine Itching    Seasonal        MEDICATIONS:   Current Outpatient Medications   Medication Sig Dispense Refill    acetaminophen (TYLENOL) 500 MG tablet Take 500 mg by mouth every 6 hours as needed for Pain      HYDROcodone-acetaminophen (NORCO) 5-325 MG per tablet Take 1 tablet by mouth every 12 hours as needed for Pain for up to 30 days. 60 tablet 0    ibuprofen (IBU) 600 MG tablet Take 1 tablet by mouth every 6 hours as needed for Pain 30 tablet 0    fenofibrate micronized (LOFIBRA) 134 MG capsule Take 1 capsule by mouth every morning (before breakfast) 90 capsule 3    lisinopril (PRINIVIL;ZESTRIL) 40 MG tablet Take 1 tablet by mouth daily 90 tablet 3    citalopram (CELEXA) 10 MG tablet Take 1 tablet by mouth daily 90 tablet 3    spironolactone (ALDACTONE) 25 MG tablet Take 1 tablet by mouth daily 90 tablet 3    carvedilol (COREG) 12.5 MG tablet Take 1 tablet by mouth 2 times daily 180 tablet 3    apixaban (ELIQUIS) 5 MG TABS tablet Take 1 tablet by mouth 2 times daily 180 tablet 3    gabapentin (NEURONTIN) 300 MG capsule Take 1 capsule by mouth nightly for 180 days. 90 capsule 1    lidocaine (XYLOCAINE) 5 % ointment 4-5 times a day to your right thigh for pain.  240 g 3    furosemide (LASIX) 40 MG tablet Take 1 tablet by mouth daily 90 tablet 2    famotidine (PEPCID) 20 MG tablet Take 1 tablet by mouth 2 times daily 180 tablet 3    metFORMIN (GLUCOPHAGE) 1000 MG tablet Take 1 tablet by mouth daily (with breakfast) 90 tablet 3    pramipexole (MIRAPEX) 1 MG tablet Take 1.5 tablets by mouth daily 145 tablet 3    atorvastatin (LIPITOR) 80 MG tablet Take 1 tablet by mouth nightly 90 tablet 3    hydrALAZINE (APRESOLINE) 100 MG tablet Take 1 tablet by mouth every 8 hours 90 tablet 3    nitroGLYCERIN (NITROSTAT) 0.4 MG SL tablet Place 1 tablet under the tongue every 5 minutes as needed for Chest pain 75 tablet 3    vitamin D (CHOLECALCIFEROL) 1000 UNIT TABS tablet Take 1 tablet by mouth daily 90 tablet 3    cyclobenzaprine (FLEXERIL) 10 MG tablet Take 1 tablet by mouth 3 times daily as needed for Muscle spasms 270 tablet 3    Calcium Carbonate-Vitamin D (CALCIUM 500/D) 500-125 MG-UNIT TABS Take 1 tablet by mouth 2 times daily       cyanocobalamin 1000 MCG/ML injection Inject 1 mL into the muscle once for 1 dose 1 mL 0     No current facility-administered medications for this visit. LABS & TESTS:      Lab Results   Component Value Date    WBC 6.1 01/23/2018    HGB 12.0 01/23/2018    HCT 35.5 (L) 01/23/2018    MCV 94.6 01/23/2018     01/23/2018       REVIEW OF SYSTEMS:      CONSTITUTIONAL Weight change: absent, Appetite change: absent, Fatigue: present    HEENT Ears: normal, Visual disturbance: absent    RESPIRATORY Shortness of breath: present, Cough: absent    CARDIOVASCULAR Chest pain: absent, Leg swelling :present    GI Constipation: absent, Diarrhea: present, Swallowing change: absent     Urinary frequency: absent, Urinary urgency: absent, Urinary incontinence: absent    MUSCULOSKELETAL Neck pain: present, Back pain: present, Stiffness: present, Muscle pain: present, Joint pain: absent Restless legs: present    DERMATOLOGIC Hair loss: absent, Skin changes: absent    NEUROLOGIC Memory loss: absent, Confusion: absent, Seizures: absent Trouble walking or imbalance: present, Dizziness: absent, Weakness: absent, Numbness: absent Tremor: absent, Spasm: absent, Speech difficulty: absent, Headache: absent, Light sensitivity: absent    PSYCHIATRIC Anxiety: absent, Hallucination: absent, Mood disorder: absent    HEMATOLOGIC Abnormal bleeding: absent, Anemia: absent, Clotting disorder: absent, Lymph gland changes: absent     VITALS  BP (!) 145/80   Pulse 65   Temp 98.6 °F (37 °C)            PHYSICAL EXAMINATIONS:     General appearance: cooperative  Skin: surgical scar on left forearm.   HEENT: normocephalic  Optic Fundi: deferred  Neck: supple, no cervcical adenopathy or carotid bruit  Lungs: clear to auscultation  Heart: Regular rate and rhythm, normal S1, S2. No murmurs, clicks or gallops. Peripheral pulses: radial pulses palpable  Abdominal: BS present, soft, NT, ND  Extremities: no edema    NEUROLOGICAL EXAMINATION:     MS: awake, alert and oriented. No aphasia, dysarthria, or neglect  CNs: PERRLA, EOMI, VF full, sensation intact, face symmetric, hearing intact, soft palate rises on phonation, sternocleidomastoid and trapezius intact. Tongue midline, no fasciculations. Motor: no abnormal movements, tone and bulk okay. RUE: delta 5/5, biceps 5/5, triceps 5/5,  5/5  LUE: delta 5/5, biceps 5/5, triceps 5/5,  5/5  RLE: hf 4+/5, ke 4+/5, df 5/5, pf 5/5  LLE: hf 4+/5, ke 4+/5, df 5/5, pf 5/5  Reflexes: 1+ throughout except left arm (deferred), babinski not present. Coordination: FNF no dysmetria, heel to shin okay, RAMBO okay, negative Rhomberg. Gait: Normal straight, but decreased arm swaying, particularly on left, able to do Tandem. Sensory: stock distribution reduction to temp/pp below knees, no extinction.     ASSRSSMENT/PLANS:      // Falls  - since last visit, had one fall due to missing stairs  -reasons for her falls: multifactorial, leg weakness, back pain  - MRI brain: no stroke but volume loss  - outside EMG/NCV stated normal NCV, no detail numbers found  - on PT for legs  - fall precaution     // Poorly controlled HTN, non compliance  - today still high although improved from last time  - improved     //Meralgia paresthetica  - pain improved with lidocaine cream  - encouraged pt to lose weight, avoid tight clothing    // Muscle soreness  - check CK  - has repeat Lipid panel pending, if LDL in normal range, consider decrease statin dose     // Vitamin B12 deficiency   - continue follow with PCP for B12 supplement     // Peripheral polyneuropathy  - could be due to DM, B12 and lumbar pathology   - continue optimal BG control      >50% of  face to face time spent

## 2019-05-03 NOTE — PATIENT INSTRUCTIONS
1. Follow with primary doctor and cardiology for blood pressure control  2.. Blood work, check CK for muscle soreness  3. Fall precaution  4. Follow with your primary doctor for diarrhea.      Return in 3 months    Nehemias Miller MD, MS

## 2019-05-07 ENCOUNTER — HOSPITAL ENCOUNTER (OUTPATIENT)
Dept: PHYSICAL THERAPY | Age: 69
Setting detail: THERAPIES SERIES
Discharge: HOME | End: 2019-05-07
Payer: MEDICARE

## 2019-05-07 PROCEDURE — 97110 THERAPEUTIC EXERCISES: CPT

## 2019-05-07 PROCEDURE — 97140 MANUAL THERAPY 1/> REGIONS: CPT

## 2019-05-07 ASSESSMENT — PAIN DESCRIPTION - PROGRESSION: CLINICAL_PROGRESSION: GRADUALLY IMPROVING

## 2019-05-07 NOTE — PROGRESS NOTES
Reviewed skin precautions with use of kinesiotape  REQUIRES PT FOLLOW UP: Yes  Discharge Recommendations: Continue to assess pending progress   Goals:  Patient Goals:  Patient goals : \" To get my hand better \"  Short Term Goals:  Time Frame for Short term goals: 6 visits  Short term goal 1: Decrease pain with movement to 2/10 -  MET  Short term goal 2: Decrease edema by 0.5 cm to decrease feeling of tightness -ONGOING  Short term goal 3: Increase ROM by 10 degrees or better in restricted planes to be able to feed self, curl hair -PARTLY MET  Short term goal 4: Improve coordination by 5-10 sec - ONGOING  Short term goal 5: Independent with HEP - MET  Long Term Goals:  Time Frame for Long term goals : 12 visits  Long term goal 1: Be able to make a full fist to grasp objects better - PARTLY MET  Long term goal 2: Set up strength goals for / prehension when appropriate. NEW GOAL: Improve  strength by 5-10# and prehension by 2#@ to be able to open jars, grasp better  Long term goal 3: Improve functional score in QUICK DASH by 10 points or better from 42 = 70.45 disability ( 0= no disability ) - ONGOING    Plan:     Times per week: 2-3x/wk for 12 visits  Current Treatment Recommendations: Strengthening;Manual Therapy - Joint Manipulation;Patient/Caregiver Education & Training;ROM;Modalities; Neuromuscular Re-education;Manual Therapy - Soft Tissue Mobilization;Home Exercise Program  Plan Comment: Progress as tolerated, recheck on next visit    Therapy Time:  Time In: 1045  Time Out: 1125  Minutes: 40  Timed Code Treatment Minutes: 40 Minutes    Treatment Charges: Minutes Units   []  Ultrasound     []  Electrical-Stim     []  Iontophoresis     []  Traction     []  Massage       []  Eval     []  Gait     [x]  Ther Exercise 25  2    [x]  Manual Therapy 15  1    []  Ther Activities       []  Aquatics     []  Neuro Re-Ed       []  Other       Total Treatment Time: 36  3       Jolamireya Age, PT Electronically signed by Jayde Cheng PT,CHT on 5/7/2019 at 11:33 AM

## 2019-05-08 ENCOUNTER — HOSPITAL ENCOUNTER (OUTPATIENT)
Dept: PHYSICAL THERAPY | Age: 69
Setting detail: THERAPIES SERIES
Discharge: HOME | End: 2019-05-08
Payer: MEDICARE

## 2019-05-08 PROCEDURE — 97140 MANUAL THERAPY 1/> REGIONS: CPT

## 2019-05-08 PROCEDURE — 97110 THERAPEUTIC EXERCISES: CPT

## 2019-05-08 ASSESSMENT — PAIN DESCRIPTION - PROGRESSION: CLINICAL_PROGRESSION: GRADUALLY IMPROVING

## 2019-05-08 ASSESSMENT — 9 HOLE PEG TEST
TESTTIME_SECONDS: 30
TESTTIME_SECONDS: 24

## 2019-05-08 NOTE — PROGRESS NOTES
ROM, Modalities, Neuromuscular Re-education, Manual Therapy - Soft Tissue Mobilization, Home Exercise Program  Plan Comment: Has 2 more visits to complete, to see  on 5/15/19, plan for discharge and continue with HEP. OutComes Score   QuickDASH Total Score: 20       QuickDASH Disability/Symptom Score : 20.45 %  Goals  Short term goals  Time Frame for Short term goals: 6 visits  Short term goal 1: Decrease pain with movement to 2/10 -  MET  Short term goal 2: Decrease edema by 0.5 cm to decrease feeling of tightness - PARTLY MET  Short term goal 3: Increase ROM by 10 degrees or better in restricted planes to be able to feed self, curl hair - MET  Short term goal 4: Improve coordination by 5-10 sec - MET  Short term goal 5: Independent with HEP - MET  Long term goals  Time Frame for Long term goals : 12 visits  Long term goal 1: Be able to make a full fist to grasp objects better - MET  Long term goal 2: Set up strength goals for / prehension when appropriate.  NEW GOAL: Improve  strength by 5-10# and prehension by 2#@ to be able to open jars, grasp better - MET  Long term goal 3: Improve functional score in QUICK DASH by 10 points or better from 42 = 70.45 disability ( 0= no disability ) - MET ( NOW 20 =20.45 DISABILITY/ SYMPTOM )  Patient Goals   Patient goals : \" To get my hand better \"   Therapy Time   Individual Concurrent Group Co-treatment   Time In 1045         Time Out 1135         Minutes 50         Timed Code Treatment Minutes: 50 Minutes     Treatment Charges: Minutes Units   []  Ultrasound     []  Electrical-Stim     []  Iontophoresis     []  Traction     []  Massage       []  Eval     []  Gait     [x]  Ther Exercise 35  2    [x]  Manual Therapy 15  1    []  Ther Activities       []  Aquatics     []  Vasopneumatic Device     []  Neuro Re-Ed       []  Other       Total Treatment Time: 48 3        Ma  Holli Mcconnell PT Electronically signed by Shaquille Guidry PT,T on 5/8/2019 at 11:47

## 2019-05-10 ENCOUNTER — HOSPITAL ENCOUNTER (OUTPATIENT)
Dept: PHYSICAL THERAPY | Age: 69
Setting detail: THERAPIES SERIES
Discharge: HOME | End: 2019-05-10
Payer: MEDICARE

## 2019-05-10 PROCEDURE — 97110 THERAPEUTIC EXERCISES: CPT

## 2019-05-10 PROCEDURE — 97140 MANUAL THERAPY 1/> REGIONS: CPT

## 2019-05-10 ASSESSMENT — PAIN DESCRIPTION - PROGRESSION: CLINICAL_PROGRESSION: GRADUALLY IMPROVING

## 2019-05-10 ASSESSMENT — PAIN DESCRIPTION - ORIENTATION: ORIENTATION: LEFT

## 2019-05-10 ASSESSMENT — PAIN DESCRIPTION - LOCATION: LOCATION: WRIST;HAND

## 2019-05-14 ENCOUNTER — HOSPITAL ENCOUNTER (OUTPATIENT)
Dept: PAIN MANAGEMENT | Age: 69
Discharge: HOME | End: 2019-05-14
Payer: MEDICARE

## 2019-05-14 VITALS
RESPIRATION RATE: 16 BRPM | HEIGHT: 64 IN | WEIGHT: 180 LBS | TEMPERATURE: 98.1 F | HEART RATE: 70 BPM | OXYGEN SATURATION: 95 % | BODY MASS INDEX: 30.73 KG/M2

## 2019-05-14 DIAGNOSIS — G57.11 MERALGIA PARESTHETICA OF RIGHT SIDE: Primary | ICD-10-CM

## 2019-05-14 DIAGNOSIS — M51.36 DDD (DEGENERATIVE DISC DISEASE), LUMBAR: ICD-10-CM

## 2019-05-14 DIAGNOSIS — Z51.81 ENCOUNTER FOR MEDICATION MONITORING: ICD-10-CM

## 2019-05-14 DIAGNOSIS — M46.1 SACROILIITIS (HCC): ICD-10-CM

## 2019-05-14 DIAGNOSIS — M47.816 SPONDYLOSIS OF LUMBAR REGION WITHOUT MYELOPATHY OR RADICULOPATHY: ICD-10-CM

## 2019-05-14 DIAGNOSIS — M48.062 NEUROGENIC CLAUDICATION DUE TO LUMBAR SPINAL STENOSIS: ICD-10-CM

## 2019-05-14 DIAGNOSIS — G57.10 MERALGIA PARESTHETICA, UNSPECIFIED LATERALITY: ICD-10-CM

## 2019-05-14 DIAGNOSIS — M47.816 FACET ARTHRITIS OF LUMBAR REGION: ICD-10-CM

## 2019-05-14 DIAGNOSIS — M51.36 LUMBAR DEGENERATIVE DISC DISEASE: ICD-10-CM

## 2019-05-14 DIAGNOSIS — M16.11 PRIMARY OSTEOARTHRITIS OF RIGHT HIP: ICD-10-CM

## 2019-05-14 DIAGNOSIS — Z51.81 MEDICATION MONITORING ENCOUNTER: ICD-10-CM

## 2019-05-14 DIAGNOSIS — M47.816 LUMBAR FACET ARTHROPATHY: ICD-10-CM

## 2019-05-14 PROCEDURE — 99213 OFFICE O/P EST LOW 20 MIN: CPT | Performed by: NURSE PRACTITIONER

## 2019-05-14 PROCEDURE — 99213 OFFICE O/P EST LOW 20 MIN: CPT

## 2019-05-14 PROCEDURE — 80307 DRUG TEST PRSMV CHEM ANLYZR: CPT

## 2019-05-14 RX ORDER — OXYCODONE HYDROCHLORIDE AND ACETAMINOPHEN 5; 325 MG/1; MG/1
1 TABLET ORAL 2 TIMES DAILY PRN
Qty: 60 TABLET | Refills: 0 | Status: SHIPPED | OUTPATIENT
Start: 2019-05-18 | End: 2019-06-11 | Stop reason: SDUPTHER

## 2019-05-14 ASSESSMENT — ENCOUNTER SYMPTOMS
GASTROINTESTINAL NEGATIVE: 1
RESPIRATORY NEGATIVE: 1
EYES NEGATIVE: 1
BACK PAIN: 1

## 2019-05-14 NOTE — PROGRESS NOTES
note documentation, Obtaining appropriate analgesic effect of treatment. , Random urine drug screen sent today. (MAIK Nunez CNP)  Chronic Pain > 80 MEDD: Obtained or confirmed a written medication contract was on file. MAIK Nunez CNP)  Review ofOARRS does not show any aberrant prescription behavior. Medication is helping the patient stay active. Patient denies any side effects and reports adequate analgesia. No sign of misuse/abuse. When was thelast UDS:    10-4-18         Was the UDS appropriate:yes      Record/Diagnostics Review:      As above, I did review the imaging  10/9/2018  2:44 PM - Luciano, M[facility]pn Incoming Lab Results From [facility]     Component Value Ref Range & Units Status Collected Lab   Pain Management Drug Panel Interp, Urine Consistent   Final 10/04/2018  9:25 AM [facility]   (NOTE)   ________________________________________________________________   DRUGS EXPECTED:   NORCO (HYDROCODONE) [10/3/18]   ________________________________________________________________   CONSISTENT with medications provided:   Pam Alonso (HYDROCODONE) : based on hydrocodone, norhydrocodone   ________________________________________________________________   Drugs Not Included in this Assay:   Acetaminophen   ________________________________________________________________   INTERPRETIVE INFORMATION: Pain Mgt [place], [place] Spec/EMIT, Ur,                            Interp   Interpretation depends on accuracy and completeness of patient   medication information submitted by client.     6-Acetylmorphine, Ur Not Detected   Final 10/04/2018  9:25 AM [facility]   7-Aminoclonazepam, Urine Not Detected   Final 10/04/2018  9:25 AM [facility]   Alpha-OH-Alpraz, Urine Not Detected   Final 10/04/2018  9:25 AM [facility]   Alprazolam, Urine Not Detected   Final 10/04/2018  9:25 AM [facility]   Amphetamines, urine Not Detected   Final 10/04/2018  9:25 AM [facility]   Barbiturates, Ur Not Detected   Final 10/04/2018  9:25 AM [facility]   Benzoylecgonine, Ur Not Detected   Final 10/04/2018  9:25 AM [facility]   Buprenorphine Urine Not Detected   Final 10/04/2018  9:25 AM [facility]   Carisoprodol, Ur Not Detected   Final 10/04/2018  9:25 AM [facility]   (NOTE)   The carisoprodol immunoassay has cross-reactivity to carisoprodol   and meprobamate.     Clonazepam, Urine Not Detected   Final 10/04/2018  9:25 AM [facility]   Codeine, Urine Not Detected   Final 10/04/2018  9:25 AM [facility]   MDA, Ur Not Detected   Final 10/04/2018  9:25 AM [facility]   Diazepam, Urine Not Detected   Final 10/04/2018  9:25 AM [facility]   Ethyl Glucuronide Ur Not Detected   Final 10/04/2018  9:25 AM [facility]   Fentanyl, Ur Not Detected   Final 10/04/2018  9:25 AM [facility]   Hydrocodone, Urine Present   Final 10/04/2018  9:25 AM [facility]   Hydromorphone, Urine Not Detected   Final 10/04/2018  9:25 AM [facility]   Lorazepam, Urine Not Detected   Final 10/04/2018  9:25 AM [facility]   Marijuana Metab, Ur Not Detected   Final 10/04/2018  9:25 AM [facility]   MDEA, RADHA, Ur Not Detected   Final 10/04/2018  9:25 AM [facility]   MDMA, Urine Not Detected   Final 10/04/2018  9:25 AM [facility]   Meperidine Metab, Ur Not Detected   Final 10/04/2018  9:25 AM [facility]   Methadone, Urine Not Detected   Final 10/04/2018  9:25 AM [facility]   Methamphetamine, Urine Not Detected   Final 10/04/2018  9:25 AM [facility]   Methylphenidate Not Detected   Final 10/04/2018  9:25 AM [facility]   Midazolam, Urine Not Detected   Final 10/04/2018  9:25 AM [facility]   Morphine Urine Not Detected   Final 10/04/2018  9:25 AM [facility]   Norbuprenorphine, Urine Not Detected   Final 10/04/2018  9:25 AM [facility]   Nordiazepam, Urine Not Detected   Final 10/04/2018  9:25 AM [facility]   Norfentanyl, Urine Not Detected   Final 10/04/2018  9:25 AM [facility]   NORHYDROCODONE, URINE Present   Final 10/04/2018  9:25 AM [facility]   Noroxycodone, Urine Not Detected   Final 10/04/2018  9:25 AM [facility]   NOROXYMORPHONE, URINE Not Detected   Final 10/04/2018  9:25 AM [facility]   Oxazepam, Urine Not Detected   Final 10/04/2018  9:25 AM [facility]   Oxycodone Urine Not Detected   Final 10/04/2018  9:25 AM [facility]   Oxymorphone, Urine Not Detected   Final 10/04/2018  9:25 AM [facility]   PCP, Urine Not Detected   Final 10/04/2018  9:25 AM [facility]   Phentermine, Ur Not Detected   Final 10/04/2018  9:25 AM [facility]   Propoxyphene, Urine Not Detected   Final 10/04/2018  9:25 AM [facility]   Tapentadol-O-Sulfate, Urine Not Detected   Final 10/04/2018  9:25 AM [facility]   Tapentadol, Urine Not Detected   Final 10/04/2018  9:25 AM [facility]   Temazepam, Urine Not Detected   Final 10/04/2018  9:25 AM [facility]   Tramadol, Urine Not Detected   Final 10/04/2018  9:25 AM [facility]   Zolpidem, Urine Not Detected   Final 10/04/2018  9:25 AM [facility]   Drugs Expected, Ur   Final 10/04/2018  9:25 AM [facility] [address] 10/3/2018 AT PM    Creatinine, Ur 117.2  20.0 - 400.0 mg/dL Final 10/04/2018  9:25 AM [facility]   Pain Mgt Drug Panel, Hi Res, Ur See Below   Final 10/04/2018  9:25 AM [facility]   (NOTE)   Methodology: Qualitative Enzyme Immunoassay and Qualitative Liquid   Chromatography-Time of Flight-Mass Spectrometry or Tandem Mass   Spectrometry, Quantitative Spectrophotometry   The absence of expected drug(s) and/or drug metabolite(s) may   indicate non-compliance, inappropriate timing of specimen   collection relative to drug administration, poor drug absorption,   diluted/adulterated urine, or limitations of testing. The   concentration must be greater than or equal to the cutoff to be   reported as present.  If specific drug concentrations are   required, contact the laboratory within two weeks of specimen   collection to request quantification by a second analytical   technique. Interpretive questions should be directed to the   laboratory. Results based on immunoassay detection that do not match clinical   expectations should be   interpreted with caution. Confirmatory testing by mass   spectrometry for immunoassay-based results is available, if   ordered within two weeks of specimen collection. Additional   charges apply.    For medical purposes only; not valid for forensic use. This test was developed and its performance characteristics   determined by [facility]. The U.S. Food and Drug   Administration has not approved or cleared this test; however, FDA   clearance or approval is not currently required for clinical use. The results are not intended to be used as the sole means for   clinical diagnosis or patient management decisions. EER Hi Res Interp Ur See Note   Final 10/04/2018  9:25 AM [facility]   (NOTE)   Access [facility] Enhanced Report using either link below:   -Direct access: [email or web address]   -Enter Username, Password: [email or web address]   Username: [email or web address]   Password: [email or web address]   Performed by [facility] Corona,   [address] [address] [facility] [phone]   www. Tex Kovacs MD, Lab.  Director              Past Medical History:   Diagnosis Date    Allergic rhinitis, cause unspecified     Back pain     lumbar    Bowel obstruction (HCC)     history of due to scar tissue, resolved non-surgically    CAD (coronary artery disease)     Cellulitis     left leg    Cellulitis 2017 August    leg left leg/bug bite    Diverticulosis of colon (without mention of hemorrhage)     GERD (gastroesophageal reflux disease)     History of MI (myocardial infarction) 2005    thought due to a blood clot    History of ovarian cyst 1970    had oopherectomy    History of peritonitis 1968    due to ruptured appendix age 13    HTN (hypertension)     Hx of blood clots     right leg    Hyperlipidemia     Intestinal or peritoneal adhesions with obstruction (postoperative) (postinfection) ([address]     Kidney infection     renal failure/sepsis/spider bite    Lateral epicondylitis  of elbow     Muscle strain     right posterior shoulder    Other abnormal glucose     Restless legs syndrome (RLS)     Vitamin D deficiency     Wears glasses        Past Surgical History:   Procedure Laterality Date    MG capsule, Take 1 capsule by mouth nightly for 180 days. , Disp: 90 capsule, Rfl: 1    cyanocobalamin 1000 MCG/ML injection, Inject 1 mL into the muscle once for 1 dose, Disp: 1 mL, Rfl: 0    lidocaine (XYLOCAINE) 5 % ointment, 4-5 times a day to your right thigh for pain., Disp: 240 g, Rfl: 3    furosemide (LASIX) 40 MG tablet, Take 1 tablet by mouth daily, Disp: 90 tablet, Rfl: 2    famotidine (PEPCID) 20 MG tablet, Take 1 tablet by mouth 2 times daily, Disp: 180 tablet, Rfl: 3    metFORMIN (GLUCOPHAGE) 1000 MG tablet, Take 1 tablet by mouth daily (with breakfast), Disp: 90 tablet, Rfl: 3    pramipexole (MIRAPEX) 1 MG tablet, Take 1.5 tablets by mouth daily, Disp: 145 tablet, Rfl: 3    atorvastatin (LIPITOR) 80 MG tablet, Take 1 tablet by mouth nightly, Disp: 90 tablet, Rfl: 3    hydrALAZINE (APRESOLINE) 100 MG tablet, Take 1 tablet by mouth every 8 hours, Disp: 90 tablet, Rfl: 3    vitamin D (CHOLECALCIFEROL) 1000 UNIT TABS tablet, Take 1 tablet by mouth daily, Disp: 90 tablet, Rfl: 3    Calcium Carbonate-Vitamin D (CALCIUM 500/D) 500-125 MG-UNIT TABS, Take 1 tablet by mouth 2 times daily , Disp: , Rfl:     acetaminophen (TYLENOL) 500 MG tablet, Take 500 mg by mouth every 6 hours as needed for Pain, Disp: , Rfl:     nitroGLYCERIN (NITROSTAT) 0.4 MG SL tablet, Place 1 tablet under the tongue every 5 minutes as needed for Chest pain, Disp: 75 tablet, Rfl: 3    cyclobenzaprine (FLEXERIL) 10 MG tablet, Take 1 tablet by mouth 3 times daily as needed for Muscle spasms, Disp: 270 tablet, Rfl: 3    Family History   Problem Relation Age of Onset    Stroke Mother     Diabetes Mother     Heart Disease Mother     High Blood Pressure Mother     Heart Disease Father     Heart Disease Brother     High Blood Pressure Brother     Heart Disease Maternal Grandmother     High Blood Pressure Sister        Social History     Socioeconomic History    Marital status:      Spouse name: Not on file    Number of children: Not on file    Years of education: Not on file    Highest education level: Not on file   Occupational History    Occupation: retired   Social Needs    Financial resource strain: Not on file    Food insecurity:     Worry: Not on file     Inability: Not on file   [employer] needs:     Medical: Not on file     Non-medical: Not on file   Tobacco Use    Smoking status: Former Smoker     Packs/day: 0.50     Years: 20.00     Pack years: 10.00     Types: Cigarettes     Start date: 1995     Last attempt to quit: 2017     Years since quittin.8    Smokeless tobacco: Never Used    Tobacco comment: 18 quit 10 days ago   Substance and Sexual Activity    Alcohol use: No     Alcohol/week: 0.0 oz    Drug use: No    Sexual activity: Not on file   Lifestyle    Physical activity:     Days per week: Not on file     Minutes per session: Not on file    Stress: Not on file   Relationships    Social connections:     Talks on phone: Not on file     Gets together: Not on file     Attends Christian service: Not on file     Active member of club or organization: Not on file     Attends meetings of clubs or organizations: Not on file     Relationship status: Not on file    Intimate partner violence:     Fear of current or ex partner: Not on file     Emotionally abused: Not on file     Physically abused: Not on file     Forced sexual activity: Not on file   Other Topics Concern    Not on file   Social History Narrative    Not on file       Review of Systems:  Review of Systems   Constitution: Negative. HENT: Negative. Eyes: Negative. Cardiovascular: Positive for dyspnea on exertion. Respiratory: Negative. Hematologic/Lymphatic: Bruises/bleeds easily. Skin: Negative. Musculoskeletal: Positive for back pain, falls and joint pain. Gastrointestinal: Negative. Genitourinary: Positive for nocturia. Neurological: Positive for loss of balance and numbness. Psychiatric/Behavioral: Positive for depression. Managing         Physical Exam:  Pulse 70   Temp 98.1 °F (36.7 °C) (Oral)   Resp 16   Ht 5' 4\" (1.626 m)   Wt 180 lb (81.6 kg)   SpO2 95%   BMI 30.90 kg/m²     Physical Exam   Constitutional: She appears well-developed. overweight   HENT:   Head: Normocephalic. Neck: Normal range of motion. Neck supple. Pulmonary/Chest: Effort normal.   Musculoskeletal:        Lumbar back: She exhibits decreased range of motion and tenderness. Tender si joints   Neurological: She is alert. A sensory deficit is present. Reflex Scores:       Patellar reflexes are 2+ on the right side and 2+ on the left side. Achilles reflexes are 2+ on the right side and 2+ on the left side. Skin: Skin is warm, dry and intact. Psychiatric: Her speech is normal and behavior is normal. Judgment and thought content normal. Cognition and memory are normal. She exhibits a depressed mood.          Assessment:      Problem List Items Addressed This Visit     Spondylosis of lumbar region without myelopathy or radiculopathy    Relevant Medications    oxyCODONE-acetaminophen (PERCOCET) 5-325 MG per tablet (Start on 5/18/2019)    Sacroiliitis ([address]    Relevant Medications    oxyCODONE-acetaminophen (PERCOCET) 5-325 MG per tablet (Start on 5/18/2019)    Neurogenic claudication due to lumbar spinal stenosis    Relevant Medications    oxyCODONE-acetaminophen (PERCOCET) 5-325 MG per tablet (Start on 5/18/2019)    Meralgia paresthetica - Primary    Relevant Medications    oxyCODONE-acetaminophen (PERCOCET) 5-325 MG per tablet (Start on 5/18/2019)    Lumbar facet arthropathy    Relevant Medications    oxyCODONE-acetaminophen (PERCOCET) 5-325 MG per tablet (Start on 5/18/2019)    Facet arthritis of lumbar region [facility])    Relevant Medications    oxyCODONE-acetaminophen (PERCOCET) 5-325 MG per tablet (Start on 5/18/2019)    Encounter for medication monitoring    Relevant Medications oxyCODONE-acetaminophen (PERCOCET) 5-325 MG per tablet (Start on 5/18/2019)    Degenerative joint disease (DJD) of hip    Relevant Medications    oxyCODONE-acetaminophen (PERCOCET) 5-325 MG per tablet (Start on 5/18/2019)    DDD (degenerative disc disease), lumbar    Relevant Medications    oxyCODONE-acetaminophen (PERCOCET) 5-325 MG per tablet (Start on 5/18/2019)      Other Visit Diagnoses     Lumbar degenerative disc disease        Relevant Medications    oxyCODONE-acetaminophen (PERCOCET) 5-325 MG per tablet (Start on 5/18/2019)    Medication monitoring encounter        Relevant Medications    oxyCODONE-acetaminophen (PERCOCET) 5-325 MG per tablet (Start on 5/18/2019)            Treatment Plan:  DISCUSSION: Treatment options discussed withpatient and all questions answered to patient's satisfaction. Possible side effects, risk of tolerance and or dependence and alternative treatments discussed    Obtaining appropriate analgesic effect of treatment   No signs of potential drug abuse or diversion identified    [x] Ill effects of being on chronic pain medications such as sleep disturbances, respiratory depression, hormonal changes, withdrawal symptoms, chronic opioid dependence and tolerance as well as risk of taking opioids with Benzodiazepines and taking opioids along with alcohol,  werediscussed with patient. I had asked the patient to minimize medication use and utilize pain medications only for uncontrolled rest pain or pain with exertional activities. I advised patient not to self-escalate painmedications without consulting with us. At each of patient's future visits we will try to taper pain medications, while adjusting the adjunct medications, and re-evaluating for Physical Therapy to improve spinal andjoint strength. We will continue to have discussions to decrease pain medications as tolerated.       Counseled patient on effects their pain medication and /or their medical condition mayhave on their

## 2019-05-15 ENCOUNTER — OFFICE VISIT (OUTPATIENT)
Dept: ORTHOPEDIC SURGERY | Age: 69
End: 2019-05-15

## 2019-05-15 DIAGNOSIS — S62.102D CLOSED FRACTURE OF LEFT WRIST WITH ROUTINE HEALING, SUBSEQUENT ENCOUNTER: Primary | ICD-10-CM

## 2019-05-15 PROCEDURE — 99024 POSTOP FOLLOW-UP VISIT: CPT | Performed by: ORTHOPAEDIC SURGERY

## 2019-05-15 NOTE — PROGRESS NOTES
Gareth Montero M.D.            [address] [facility] [facility] [address] [facility],[address], [address] [facility]             Dept Phone: [phone]             Dept Fax:  [phone]  Jenae Nunez returns today status post ORIF left distal radius fracture on 3/5/19 making her 10 weeks postop. She states that she's completed therapy doing very well    Examination notes her incisions pristine. She has dorsiflexion volar flexion at least 35°. Supination about 70 pronation is full. She has good  strength. I will be her fingers well    XR taken today:  Xr Wrist Left (2 Views)    Result Date: 5/15/2019  X-rays taken today reviewed by me show AP lateral views of the his left distal radius and ulna. The hardware looks anatomic as does the fracture reduction. No interval changes noted. Callus is visible. Impression  Status post ORIF left distal radius fracture doing very well    Plan  Patient was encouraged continue exercises. She is to wear her wrist splint which is doing yardwork otherwise back here in appearance basis  Review of Systems   Constitutional: Negative. HENT: Negative. Respiratory: Negative. Cardiovascular: Negative. Neurological: Negative. Musculoskeletal:   Post-Op Check (Lt wrist fx 3/5/19)          Current Outpatient Medications:     [START ON 5/18/2019] oxyCODONE-acetaminophen (PERCOCET) 5-325 MG per tablet, Take 1 tablet by mouth 2 times daily as needed for Pain for up to 30 days. , Disp: 60 tablet, Rfl: 0    acetaminophen (TYLENOL) 500 MG tablet, Take 500 mg by mouth every 6 hours as needed for Pain, Disp: , Rfl:     fenofibrate micronized (LOFIBRA) 134 MG capsule, Take 1 capsule by mouth every morning (before breakfast), Disp: 90 capsule, Rfl: 3    lisinopril (PRINIVIL;ZESTRIL) 40 MG tablet, Take 1 tablet by mouth daily, Disp: 90 tablet, Rfl: 3    citalopram (CELEXA) 10 MG tablet, Take 1 tablet by mouth daily, Disp: 90 tablet, Rfl: 3    spironolactone (ALDACTONE) 25 MG tablet, Take 1 tablet by mouth daily, Disp: 90 tablet, Rfl: 3    carvedilol (COREG) 12.5 MG tablet, Take 1 tablet by mouth 2 times daily, Disp: 180 tablet, Rfl: 3    apixaban (ELIQUIS) 5 MG TABS tablet, Take 1 tablet by mouth 2 times daily, Disp: 180 tablet, Rfl: 3    gabapentin (NEURONTIN) 300 MG capsule, Take 1 capsule by mouth nightly for 180 days. , Disp: 90 capsule, Rfl: 1    cyanocobalamin 1000 MCG/ML injection, Inject 1 mL into the muscle once for 1 dose, Disp: 1 mL, Rfl: 0    lidocaine (XYLOCAINE) 5 % ointment, 4-5 times a day to your right thigh for pain., Disp: 240 g, Rfl: 3    furosemide (LASIX) 40 MG tablet, Take 1 tablet by mouth daily, Disp: 90 tablet, Rfl: 2    famotidine (PEPCID) 20 MG tablet, Take 1 tablet by mouth 2 times daily, Disp: 180 tablet, Rfl: 3    metFORMIN (GLUCOPHAGE) 1000 MG tablet, Take 1 tablet by mouth daily (with breakfast), Disp: 90 tablet, Rfl: 3    pramipexole (MIRAPEX) 1 MG tablet, Take 1.5 tablets by mouth daily, Disp: 145 tablet, Rfl: 3    atorvastatin (LIPITOR) 80 MG tablet, Take 1 tablet by mouth nightly, Disp: 90 tablet, Rfl: 3    hydrALAZINE (APRESOLINE) 100 MG tablet, Take 1 tablet by mouth every 8 hours, Disp: 90 tablet, Rfl: 3    nitroGLYCERIN (NITROSTAT) 0.4 MG SL tablet, Place 1 tablet under the tongue every 5 minutes as needed for Chest pain, Disp: 75 tablet, Rfl: 3    vitamin D (CHOLECALCIFEROL) 1000 UNIT TABS tablet, Take 1 tablet by mouth daily, Disp: 90 tablet, Rfl: 3    cyclobenzaprine (FLEXERIL) 10 MG tablet, Take 1 tablet by mouth 3 times daily as needed for Muscle spasms, Disp: 270 tablet, Rfl: 3    Calcium Carbonate-Vitamin D (CALCIUM 500/D) 500-125 MG-UNIT TABS, Take 1 tablet by mouth 2 times daily , Disp: , Rfl:     Allergies   Allergen Reactions    Bactrim [Sulfamethoxazole-Trimethoprim] Other (See Comments)     sepsis    Codeine Itching    Seasonal        Social History     Socioeconomic History    Marital status:      Spouse name: Not on file    Number of children: Not on file    Years of education: Not on file    Highest education level: Not on file   Occupational History    Occupation: retired   Social Needs    Financial resource strain: Not on file    Food insecurity:     Worry: Not on file     Inability: Not on file   [employer] needs:     Medical: Not on file     Non-medical: Not on file   Tobacco Use    Smoking status: Former Smoker     Packs/day: 0.50     Years: 20.00     Pack years: 10.00     Types: Cigarettes     Start date: 1995     Last attempt to quit: 2017     Years since quittin.9    Smokeless tobacco: Never Used    Tobacco comment: 17 quit 10 days ago   Substance and Sexual Activity    Alcohol use: No     Alcohol/week: 0.0 oz    Drug use: No    Sexual activity: Not on file   Lifestyle    Physical activity:     Days per week: Not on file     Minutes per session: Not on file    Stress: Not on file   Relationships    Social connections:     Talks on phone: Not on file     Gets together: Not on file     Attends Nondenominational service: Not on file     Active member of club or organization: Not on file     Attends meetings of clubs or organizations: Not on file     Relationship status: Not on file    Intimate partner violence:     Fear of current or ex partner: Not on file     Emotionally abused: Not on file     Physically abused: Not on file     Forced sexual activity: Not on file   Other Topics Concern    Not on file   Social History Narrative    Not on file       Past Medical History:   Diagnosis Date    Allergic rhinitis, cause unspecified     Back pain     lumbar    [address]el obstruction ([facility] [address]     history of due to scar tissue, resolved non-surgically    CAD (coronary artery disease)     Cellulitis     left leg    Cellulitis 2017    leg left leg/bug bite    Diverticulosis of colon (without mention of hemorrhage)     GERD (gastroesophageal reflux disease)     History of MI (myocardial infarction) 2005    thought due to a blood clot    History of ovarian cyst 1970    had oopherectomy    History of peritonitis 1968    due to ruptured appendix age 13    HTN (hypertension)     Hx of blood clots     right leg    Hyperlipidemia     Intestinal or peritoneal adhesions with obstruction (postoperative) (postinfection) ([address]     Kidney infection     renal failure/sepsis/spider bite    Lateral epicondylitis  of elbow     Muscle strain     right posterior shoulder    Other abnormal glucose     Restless legs syndrome (RLS)     Vitamin D deficiency     Wears glasses      Past Surgical History:   Procedure Laterality Date    ABDOMEN SURGERY  1976    benign tumor removed near remaining overy, 1.5 pounds    APPENDECTOMY  1968    appendix ruptured, developed peritonitis    BUNIONECTOMY Left     along with calcium deposits removed   [address]  2005    negative    COLECTOMY  1969    12 INCHES REMOVED D/T OBSTRUCTION    COLONOSCOPY      CYST REMOVAL Right     right facial    HYSTERECTOMY  1973    taken as a result of recurring cysts    OTHER SURGICAL HISTORY  8/14/14    FESS    OVARY REMOVAL  1970    UNILATERAL due to cyst    OVARY REMOVAL  1971    partial, due to cyst    SINUS SURGERY  2004    WRIST FRACTURE SURGERY Left 3/5/2019    WRIST OPEN REDUCTION INTERNAL FIXATION performed by James [facility] at [facility] History   Problem Relation Age of Onset    Stroke Mother     Diabetes Mother     Heart Disease Mother     High Blood Pressure Mother     Heart Disease Father     Heart Disease Brother     High Blood Pressure Brother     Heart Disease Maternal Grandmother     High Blood Pressure Sister            Orders Placed This Encounter   Procedures    XR WRIST LEFT (2 VIEWS)     Standing Status:   Future     Number of Occurrences:   1     Standing Expiration Date:   5/16/2019 1. Closed fracture of left wrist with routine healing, subsequent encounter            Velvet Vital MD    Please note that this chart was generated using voice recognition Dragon dictation software. Although every effort was made to ensure the accuracy of this automated transcription, some errors in transcription may have occurred.

## 2019-05-17 LAB
6-ACETYLMORPHINE, UR: NOT DETECTED
7-AMINOCLONAZEPAM, URINE: NOT DETECTED
ALPHA-OH-ALPRAZ, URINE: NOT DETECTED
ALPRAZOLAM, URINE: NOT DETECTED
AMPHETAMINES, URINE: NOT DETECTED
BARBITURATES, URINE: NOT DETECTED
BENZOYLECGONINE, UR: NOT DETECTED
BUPRENORPHINE URINE: NOT DETECTED
CARISOPRODOL, UR: NOT DETECTED
CLONAZEPAM, URINE: NOT DETECTED
CODEINE, URINE: NOT DETECTED
CREATININE URINE: 119.2 MG/DL (ref 20–400)
DIAZEPAM, URINE: NOT DETECTED
DRUGS EXPECTED, UR: NORMAL
EER HI RES INTERP UR: NORMAL
ETHYL GLUCURONIDE UR: NOT DETECTED
FENTANYL URINE: NOT DETECTED
HYDROCODONE, URINE: NOT DETECTED
HYDROMORPHONE, URINE: NOT DETECTED
LORAZEPAM, URINE: NOT DETECTED
MARIJUANA METAB, UR: NOT DETECTED
MDA, UR: NOT DETECTED
MDEA, EVE, UR: NOT DETECTED
MDMA URINE: NOT DETECTED
MEPERIDINE METAB, UR: NOT DETECTED
METHADONE, URINE: NOT DETECTED
METHAMPHETAMINE, URINE: NOT DETECTED
METHYLPHENIDATE: NOT DETECTED
MIDAZOLAM, URINE: NOT DETECTED
MORPHINE URINE: NOT DETECTED
NORBUPRENORPHINE, URINE: NOT DETECTED
NORDIAZEPAM, URINE: NOT DETECTED
NORFENTANYL, URINE: NOT DETECTED
NORHYDROCODONE, URINE: NOT DETECTED
NOROXYCODONE, URINE: NOT DETECTED
NOROXYMORPHONE, URINE: NOT DETECTED
OXAZEPAM, URINE: NOT DETECTED
OXYCODONE URINE: NOT DETECTED
OXYMORPHONE, URINE: NOT DETECTED
PAIN MANAGEMENT DRUG PANEL INTERP, URINE: NORMAL
PAIN MGT DRUG PANEL, HI RES, UR: NORMAL
PCP,URINE: NOT DETECTED
PHENTERMINE, UR: NOT DETECTED
PROPOXYPHENE, URINE: NOT DETECTED
TAPENTADOL, URINE: NOT DETECTED
TAPENTADOL-O-SULFATE, URINE: NOT DETECTED
TEMAZEPAM, URINE: NOT DETECTED
TRAMADOL, URINE: NOT DETECTED
ZOLPIDEM, URINE: NOT DETECTED

## 2019-05-20 ENCOUNTER — PROCEDURE VISIT (OUTPATIENT)
Dept: INTERNAL MEDICINE CLINIC | Age: 69
End: 2019-05-20
Payer: MEDICARE

## 2019-05-20 ENCOUNTER — HOSPITAL ENCOUNTER (OUTPATIENT)
Age: 69
Setting detail: SPECIMEN
Discharge: HOME | End: 2019-05-20
Payer: MEDICARE

## 2019-05-20 DIAGNOSIS — N18.30 STAGE 3 CHRONIC KIDNEY DISEASE (HCC): ICD-10-CM

## 2019-05-20 DIAGNOSIS — G45.9 TIA (TRANSIENT ISCHEMIC ATTACK): ICD-10-CM

## 2019-05-20 DIAGNOSIS — G57.10 MERALGIA PARESTHETICA, UNSPECIFIED LATERALITY: ICD-10-CM

## 2019-05-20 DIAGNOSIS — E78.2 MIXED HYPERLIPIDEMIA: ICD-10-CM

## 2019-05-20 DIAGNOSIS — E11.59 TYPE 2 DIABETES MELLITUS WITH OTHER CIRCULATORY COMPLICATION, WITHOUT LONG-TERM CURRENT USE OF INSULIN (HCC): ICD-10-CM

## 2019-05-20 DIAGNOSIS — I10 ESSENTIAL HYPERTENSION: ICD-10-CM

## 2019-05-20 DIAGNOSIS — E53.8 VITAMIN B 12 DEFICIENCY: Primary | ICD-10-CM

## 2019-05-20 LAB
ANION GAP SERPL CALCULATED.3IONS-SCNC: 12 MMOL/L (ref 9–17)
BILIRUBIN URINE: NEGATIVE
BUN BLDV-MCNC: 28 MG/DL (ref 8–23)
BUN/CREAT BLD: ABNORMAL (ref 9–20)
CALCIUM SERPL-MCNC: 8.9 MG/DL (ref 8.6–10.4)
CHLORIDE BLD-SCNC: 106 MMOL/L (ref 98–107)
CHOLESTEROL/HDL RATIO: 2.1
CHOLESTEROL: 103 MG/DL
CO2: 23 MMOL/L (ref 20–31)
COLOR: YELLOW
COMMENT UA: NORMAL
CREAT SERPL-MCNC: 0.87 MG/DL (ref 0.5–0.9)
GFR AFRICAN AMERICAN: >60 ML/MIN
GFR NON-AFRICAN AMERICAN: >60 ML/MIN
GFR SERPL CREATININE-BSD FRML MDRD: ABNORMAL ML/MIN/{1.73_M2}
GFR SERPL CREATININE-BSD FRML MDRD: ABNORMAL ML/MIN/{1.73_M2}
GLUCOSE BLD-MCNC: 95 MG/DL (ref 70–99)
GLUCOSE URINE: NEGATIVE
HCT VFR BLD CALC: 34.3 % (ref 36.3–47.1)
HDLC SERPL-MCNC: 48 MG/DL
HEMOGLOBIN: 10.7 G/DL (ref 11.9–15.1)
KETONES, URINE: NEGATIVE
LDL CHOLESTEROL: 42 MG/DL (ref 0–130)
LEUKOCYTE ESTERASE, URINE: NEGATIVE
MCH RBC QN AUTO: 31.1 PG (ref 25.2–33.5)
MCHC RBC AUTO-ENTMCNC: 31.2 G/DL (ref 28.4–34.8)
MCV RBC AUTO: 99.7 FL (ref 82.6–102.9)
NITRITE, URINE: NEGATIVE
NRBC AUTOMATED: 0 PER 100 WBC
PDW BLD-RTO: 13.3 % (ref 11.8–14.4)
PH UA: 5 (ref 5–8)
PLATELET # BLD: 312 K/UL (ref 138–453)
PMV BLD AUTO: 9.6 FL (ref 8.1–13.5)
POTASSIUM SERPL-SCNC: 4.4 MMOL/L (ref 3.7–5.3)
PROTEIN UA: NEGATIVE
RBC # BLD: 3.44 M/UL (ref 3.95–5.11)
SODIUM BLD-SCNC: 141 MMOL/L (ref 135–144)
SPECIFIC GRAVITY UA: 1.02 (ref 1–1.03)
TRIGL SERPL-MCNC: 66 MG/DL
TSH SERPL DL<=0.05 MIU/L-ACNC: 1.43 MIU/L (ref 0.3–5)
TURBIDITY: CLEAR
URINE HGB: NEGATIVE
UROBILINOGEN, URINE: NORMAL
VLDLC SERPL CALC-MCNC: NORMAL MG/DL (ref 1–30)
WBC # BLD: 6.8 K/UL (ref 3.5–11.3)

## 2019-05-20 PROCEDURE — 96372 THER/PROPH/DIAG INJ SC/IM: CPT | Performed by: INTERNAL MEDICINE

## 2019-05-20 RX ORDER — CYANOCOBALAMIN 1000 UG/ML
1000 INJECTION INTRAMUSCULAR; SUBCUTANEOUS ONCE
Status: COMPLETED | OUTPATIENT
Start: 2019-05-20 | End: 2019-05-20

## 2019-05-20 RX ADMIN — CYANOCOBALAMIN 1000 MCG: 1000 INJECTION INTRAMUSCULAR; SUBCUTANEOUS at 09:38

## 2019-05-23 ENCOUNTER — OFFICE VISIT (OUTPATIENT)
Dept: INTERNAL MEDICINE CLINIC | Age: 69
End: 2019-05-23
Payer: MEDICARE

## 2019-05-23 VITALS
SYSTOLIC BLOOD PRESSURE: 120 MMHG | BODY MASS INDEX: 31.58 KG/M2 | WEIGHT: 185 LBS | DIASTOLIC BLOOD PRESSURE: 78 MMHG | HEIGHT: 64 IN

## 2019-05-23 DIAGNOSIS — I82.5Y3 CHRONIC DEEP VEIN THROMBOSIS (DVT) OF PROXIMAL VEIN OF BOTH LOWER EXTREMITIES (HCC): ICD-10-CM

## 2019-05-23 DIAGNOSIS — K90.9 DIARRHEA DUE TO MALABSORPTION: ICD-10-CM

## 2019-05-23 DIAGNOSIS — D50.8 IRON DEFICIENCY ANEMIA SECONDARY TO INADEQUATE DIETARY IRON INTAKE: ICD-10-CM

## 2019-05-23 DIAGNOSIS — I50.32 CHRONIC DIASTOLIC HEART FAILURE (HCC): ICD-10-CM

## 2019-05-23 DIAGNOSIS — R93.6 ABNORMAL FINDINGS ON DIAGNOSTIC IMAGING OF LIMBS: ICD-10-CM

## 2019-05-23 DIAGNOSIS — E11.59 TYPE 2 DIABETES MELLITUS WITH OTHER CIRCULATORY COMPLICATION, WITHOUT LONG-TERM CURRENT USE OF INSULIN (HCC): ICD-10-CM

## 2019-05-23 DIAGNOSIS — R19.7 DIARRHEA DUE TO MALABSORPTION: ICD-10-CM

## 2019-05-23 DIAGNOSIS — Z13.820 ENCOUNTER FOR SCREENING FOR OSTEOPOROSIS: ICD-10-CM

## 2019-05-23 DIAGNOSIS — I10 ESSENTIAL HYPERTENSION: Primary | ICD-10-CM

## 2019-05-23 LAB
ESTIMATED AVERAGE GLUCOSE: 117 MG/DL
HBA1C MFR BLD: 5.7 % (ref 4–6)

## 2019-05-23 PROCEDURE — 1123F ACP DISCUSS/DSCN MKR DOCD: CPT | Performed by: INTERNAL MEDICINE

## 2019-05-23 PROCEDURE — G8598 ASA/ANTIPLAT THER USED: HCPCS | Performed by: INTERNAL MEDICINE

## 2019-05-23 PROCEDURE — 99214 OFFICE O/P EST MOD 30 MIN: CPT | Performed by: INTERNAL MEDICINE

## 2019-05-23 PROCEDURE — G8417 CALC BMI ABV UP PARAM F/U: HCPCS | Performed by: INTERNAL MEDICINE

## 2019-05-23 PROCEDURE — 3017F COLORECTAL CA SCREEN DOC REV: CPT | Performed by: INTERNAL MEDICINE

## 2019-05-23 PROCEDURE — 2022F DILAT RTA XM EVC RTNOPTHY: CPT | Performed by: INTERNAL MEDICINE

## 2019-05-23 PROCEDURE — G8427 DOCREV CUR MEDS BY ELIG CLIN: HCPCS | Performed by: INTERNAL MEDICINE

## 2019-05-23 PROCEDURE — 1090F PRES/ABSN URINE INCON ASSESS: CPT | Performed by: INTERNAL MEDICINE

## 2019-05-23 PROCEDURE — G8399 PT W/DXA RESULTS DOCUMENT: HCPCS | Performed by: INTERNAL MEDICINE

## 2019-05-23 PROCEDURE — 4040F PNEUMOC VAC/ADMIN/RCVD: CPT | Performed by: INTERNAL MEDICINE

## 2019-05-23 PROCEDURE — 1036F TOBACCO NON-USER: CPT | Performed by: INTERNAL MEDICINE

## 2019-05-23 PROCEDURE — 3046F HEMOGLOBIN A1C LEVEL >9.0%: CPT | Performed by: INTERNAL MEDICINE

## 2019-05-23 RX ORDER — MELOXICAM 15 MG/1
15 TABLET ORAL DAILY
Qty: 30 TABLET | Refills: 3 | Status: SHIPPED | OUTPATIENT
Start: 2019-05-23 | End: 2019-05-23 | Stop reason: SDUPTHER

## 2019-05-23 RX ORDER — CHOLESTYRAMINE 4 G/9G
1 POWDER, FOR SUSPENSION ORAL 2 TIMES DAILY
Qty: 90 PACKET | Refills: 3 | Status: SHIPPED | OUTPATIENT
Start: 2019-05-23 | End: 2019-05-23 | Stop reason: SDUPTHER

## 2019-05-23 RX ORDER — CHOLESTYRAMINE 4 G/9G
1 POWDER, FOR SUSPENSION ORAL 2 TIMES DAILY
Qty: 90 PACKET | Refills: 3 | Status: SHIPPED | OUTPATIENT
Start: 2019-05-23 | End: 2019-05-24 | Stop reason: SDUPTHER

## 2019-05-23 RX ORDER — MELOXICAM 15 MG/1
15 TABLET ORAL DAILY
Qty: 30 TABLET | Refills: 3 | Status: SHIPPED | OUTPATIENT
Start: 2019-05-23 | End: 2019-05-24 | Stop reason: SDUPTHER

## 2019-05-23 ASSESSMENT — ENCOUNTER SYMPTOMS
NAUSEA: 0
CHOKING: 0
APNEA: 0
EYE REDNESS: 0
BLOOD IN STOOL: 0
SINUS PRESSURE: 0
DIARRHEA: 1
ABDOMINAL DISTENTION: 0
RHINORRHEA: 0
EYE PAIN: 0
CONSTIPATION: 0
STRIDOR: 0
VOMITING: 0
COLOR CHANGE: 0
VOICE CHANGE: 0
COUGH: 0
RECTAL PAIN: 0
CHEST TIGHTNESS: 0
PHOTOPHOBIA: 0
EYE ITCHING: 0
FACIAL SWELLING: 0
SORE THROAT: 0
ABDOMINAL PAIN: 0
BACK PAIN: 0
ANAL BLEEDING: 0
EYE DISCHARGE: 0
WHEEZING: 0
SHORTNESS OF BREATH: 0
TROUBLE SWALLOWING: 0

## 2019-05-23 NOTE — PROGRESS NOTES
Subjective:      Coty Munroe is a 77 y.o. female who presents today for follow up and management of her chronic medical conditions as noted below. HPI:    Coty Munroe is c/o of   Chief Complaint   Patient presents with    Diabetes     due for a1c     Hypertension     management     Anemia     lab results     Diarrhea     after she eats    . w/u neg    check iron    fobt    last colo 2 yrs ago    diarrhea    s top metformin   Past Medical History:   Diagnosis Date    Allergic rhinitis, cause unspecified     Back pain     lumbar    Bowel obstruction (HCC)     history of due to scar tissue, resolved non-surgically    CAD (coronary artery disease)     Cellulitis     left leg    Cellulitis 2017 August    leg left leg/bug bite    Diverticulosis of colon (without mention of hemorrhage)     GERD (gastroesophageal reflux disease)     History of MI (myocardial infarction) 2005    thought due to a blood clot    History of ovarian cyst 1970    had oopherectomy    History of peritonitis 1968    due to ruptured appendix age 13    HTN (hypertension)     Hx of blood clots     right leg    Hyperlipidemia     Intestinal or peritoneal adhesions with obstruction (postoperative) (postinfection) ([address]     Kidney infection     renal failure/sepsis/spider bite    Lateral epicondylitis  of elbow     Muscle strain     right posterior shoulder    Other abnormal glucose     Restless legs syndrome (RLS)     Vitamin D deficiency     Wears glasses        Past Surgical History:   Procedure Laterality Date    ABDOMEN SURGERY  1976    benign tumor removed near remaining overy, 1.5 pounds    APPENDECTOMY  1968    appendix ruptured, developed peritonitis    BUNIONECTOMY Left     along with calcium deposits removed   [address]  2005    negative    COLECTOMY  1969    12 INCHES REMOVED D/T OBSTRUCTION    COLONOSCOPY      CYST REMOVAL Right     right facial    HYSTERECTOMY  1973    taken as a result of recurring cysts    OTHER SURGICAL HISTORY  15    FESS    OVARY REMOVAL  1970    UNILATERAL due to cyst    OVARY REMOVAL      partial, due to cyst    SINUS SURGERY      WRIST FRACTURE SURGERY Left 3/5/2019    WRIST OPEN REDUCTION INTERNAL FIXATION performed by Reginald Kirkpatrick MD at [address] History   Problem Relation Age of Onset    Stroke Mother     Diabetes Mother     Heart Disease Mother     High Blood Pressure Mother     Heart Disease Father     Heart Disease Brother     High Blood Pressure Brother     Heart Disease Maternal Grandmother     High Blood Pressure Sister        Social History     Socioeconomic History    Marital status:      Spouse name: None    Number of children: None    Years of education: None    Highest education level: None   Occupational History    Occupation: retired   Social Needs    Financial resource strain: None    Food insecurity:     Worry: None     Inability: None    Transportation needs:     Medical: None     Non-medical: None   Tobacco Use    Smoking status: Former Smoker     Packs/day: 0.50     Years: 20.00     Pack years: 10.00     Types: Cigarettes     Start date: 1995     Last attempt to quit: 2017     Years since quittin.9    Smokeless tobacco: Never Used    Tobacco comment: 18 quit 10 days ago   Substance and Sexual Activity    Alcohol use: No     Alcohol/week: 0.0 oz    Drug use: No    Sexual activity: None   Lifestyle    Physical activity:     Days per week: None     Minutes per session: None    Stress: None   Relationships    Social connections:     Talks on phone: None     Gets together: None     Attends Rastafarian service: None     Active member of club or organization: None     Attends meetings of clubs or organizations: None     Relationship status: None    Intimate partner violence:     Fear of current or ex partner: None     Emotionally abused: cyclobenzaprine (FLEXERIL) 10 MG tablet Take 1 tablet by mouth 3 times daily as needed for Muscle spasms 270 tablet 3    Calcium Carbonate-Vitamin D (CALCIUM 500/D) 500-125 MG-UNIT TABS Take 1 tablet by mouth 2 times daily       cyanocobalamin 1000 MCG/ML injection Inject 1 mL into the muscle once for 1 dose 1 mL 0     No current facility-administered medications for this visit. Review of Systems:    Review of Systems   Constitutional: Positive for fatigue. Negative for activity change, appetite change, chills, diaphoresis and fever. HENT: Negative for congestion, dental problem, drooling, ear discharge, ear pain, facial swelling, hearing loss, mouth sores, nosebleeds, postnasal drip, rhinorrhea, sinus pressure, sneezing, sore throat, tinnitus, trouble swallowing and voice change. Eyes: Negative for photophobia, pain, discharge, redness, itching and visual disturbance. Respiratory: Negative for apnea, cough, choking, chest tightness, shortness of breath, wheezing and stridor. Cardiovascular: Negative for chest pain, palpitations and leg swelling. Gastrointestinal: Positive for diarrhea. Negative for abdominal distention, abdominal pain, anal bleeding, blood in stool, constipation, nausea, rectal pain and vomiting. Endocrine: Negative for cold intolerance, heat intolerance, polydipsia, polyphagia and polyuria. Genitourinary: Negative for decreased urine volume, difficulty urinating, dyspareunia, dysuria, enuresis, flank pain, frequency, genital sores, hematuria, menstrual problem, pelvic pain, urgency, vaginal bleeding and vaginal discharge. Musculoskeletal: Positive for arthralgias. Negative for back pain, gait problem, joint swelling, myalgias, neck pain and neck stiffness. Skin: Negative for color change, pallor, rash and wound. Neurological: Negative for dizziness, tremors, seizures, syncope, facial asymmetry, speech difficulty, weakness, light-headedness, numbness and headaches. Hematological: Negative for adenopathy. Does not bruise/bleed easily. Psychiatric/Behavioral: Positive for sleep disturbance. Negative for agitation, behavioral problems, confusion, decreased concentration and dysphoric mood. The patient is nervous/anxious. Objective:     PhysicalExam:      Physical Exam   Constitutional: She is oriented to person, place, and time. She appears well-developed and well-nourished. No distress. HENT:   Head: Normocephalic and atraumatic. Right Ear: External ear normal.   Left Ear: External ear normal.   Nose: Nose normal.   Mouth/Throat: Oropharynx is clear and moist. No oropharyngeal exudate. Eyes: Pupils are equal, round, and reactive to light. Conjunctivae and EOM are normal. Right eye exhibits no discharge. Left eye exhibits no discharge. No scleral icterus. Neck: Normal range of motion. Neck supple. No JVD present. No tracheal deviation present. No thyromegaly present. Cardiovascular: Normal rate, regular rhythm, normal heart sounds and intact distal pulses. Exam reveals no gallop and no friction rub. No murmur heard. Pulmonary/Chest: Effort normal and breath sounds normal. No respiratory distress. She has no wheezes. She has no rales. She exhibits no tenderness. Abdominal: Soft. Bowel sounds are normal. She exhibits no distension and no mass. There is tenderness. There is no rebound and no guarding. Genitourinary: Rectal exam shows guaiac negative stool. No vaginal discharge found. Musculoskeletal: She exhibits tenderness. She exhibits no edema. Lymphadenopathy:     She has no cervical adenopathy. Neurological: She is alert and oriented to person, place, and time. She has normal reflexes. She displays normal reflexes. No cranial nerve deficit. She exhibits normal muscle tone. Coordination normal.   Skin: Skin is warm and dry. No rash noted. She is not diaphoretic. No erythema. No pallor. Psychiatric: She has a normal mood and affect.  Her behavior is normal. Judgment and thought content normal.         Results for orders placed or performed during the hospital encounter of 05/20/19   Urinalysis   Result Value Ref Range    Color, UA YELLOW YELLOW    Turbidity UA CLEAR CLEAR    Glucose, Ur NEGATIVE NEGATIVE    Bilirubin Urine NEGATIVE NEGATIVE    Ketones, Urine NEGATIVE NEGATIVE    Specific Gravity, UA 1.017 1.005 - 1.030    Urine Hgb NEGATIVE NEGATIVE    pH, UA 5.0 5.0 - 8.0    Protein, UA NEGATIVE NEGATIVE    Urobilinogen, Urine Normal Normal    Nitrite, Urine NEGATIVE NEGATIVE    Leukocyte Esterase, Urine NEGATIVE NEGATIVE    Urinalysis Comments       Microscopic exam not performed based on chemical results unless requested in original order. TSH without Reflex   Result Value Ref Range    TSH 1.43 0.30 - 5.00 mIU/L   Lipid Panel   Result Value Ref Range    Cholesterol 103 <200 mg/dL    HDL 48 >40 mg/dL    LDL Cholesterol 42 0 - 130 mg/dL    Chol/HDL Ratio 2.1 <5    Triglycerides 66 <150 mg/dL    VLDL NOT REPORTED 1 - 30 mg/dL   CBC   Result Value Ref Range    WBC 6.8 3.5 - 11.3 k/uL    RBC 3.44 (L) 3.95 - 5.11 m/uL    Hemoglobin 10.7 (L) 11.9 - 15.1 g/dL    Hematocrit 34.3 (L) 36.3 - 47.1 %    MCV 99.7 82.6 - 102.9 fL    MCH 31.1 25.2 - 33.5 pg    MCHC 31.2 28.4 - 34.8 g/dL    RDW 13.3 11.8 - 14.4 %    Platelets 918 460 - 490 k/uL    MPV 9.6 8.1 - 13.5 fL    NRBC Automated 0.0 0.0 per 100 WBC   Basic Metabolic Panel   Result Value Ref Range    Glucose 95 70 - 99 mg/dL    BUN 28 (H) 8 - 23 mg/dL    CREATININE 0.87 0.50 - 0.90 mg/dL    Bun/Cre Ratio NOT REPORTED 9 - 20    Calcium 8.9 8.6 - 10.4 mg/dL    Sodium 141 135 - 144 mmol/L    Potassium 4.4 3.7 - 5.3 mmol/L    Chloride 106 98 - 107 mmol/L    CO2 23 20 - 31 mmol/L    Anion Gap 12 9 - 17 mmol/L    GFR Non-African American >60 >60 mL/min    GFR African American >60 >60 mL/min    GFR Comment          GFR Staging NOT REPORTED      No results found. Assessment:      Diagnosis Orders   1. Essential hypertension   controlled  Cr nl   2. Chronic deep vein thrombosis (DVT) of proximal vein of both lower extremities (HCC)   cont ac   3. Chronic diastolic heart failure (HCC)  Compensated cont same   4. Type 2 diabetes mellitus with other circulatory complication, without long-term current use of insulin (HCC)   check hba1c d/c metformi start januvia   5. Iron deficiency anemia secondary to inadequate dietary iron intake   hb 10  Check fit, iron    6. Diarrhea due to malabsorption   questran  D/c metformin     djd   Add mobic     Plan:            Orders Placed This Encounter   Procedures    DEXA AXIAL SKELETON W VERTEBRAL FX ASST     Standing Status:   Future     Standing Expiration Date:   5/23/2020    Hemoglobin A1C     Standing Status:   Future     Standing Expiration Date:   5/22/2020    Iron And TIBC     Standing Status:   Future     Standing Expiration Date:   5/23/2020     Order Specific Question:   Is Patient Fasting? Answer:   15     Order Specific Question:   No of Hours? Answer:   15    Iron     Standing Status:   Future     Standing Expiration Date:   5/22/2020     Order Specific Question:   Is Patient Fasting? Answer:   15     Order Specific Question:   No of Hours?      Answer:   12    POCT Fecal Immunochemical Test (FIT)     Standing Status:   Future     Standing Expiration Date:   5/23/2020       Orders Placed This Encounter   Medications    meloxicam (MOBIC) 15 MG tablet     Sig: Take 1 tablet by mouth daily     Dispense:  30 tablet     Refill:  3    SITagliptin (JANUVIA) 100 MG tablet     Sig: Take 1 tablet by mouth daily     Dispense:  30 tablet     Refill:  11    cholestyramine (QUESTRAN) 4 g packet     Sig: Take 1 packet by mouth 2 times daily     Dispense:  90 packet     Refill:  3        d/c  metformin     anemia w/u       Jasiel Hanu received counseling on the following healthy behaviors: nutrition, exercise and medication adherence  Reviewed prior labs and health maintenance. Continue current medications, diet and exercise. Discussed use, benefit, and side effects of prescribed medications. Barriers to medication compliance addressed. Patient given educational materials - see patient instructions. All patient questions answered. Patient voiced understanding. 1.  Patient given educational materials - see patient instructions  2. Discussed use, benefit, and side effects of prescribed medications. Barriers to medication compliance addressed. All patient questions answered. Pt voiced understanding. 3.  Reviewed prior labs and health maintenance  4. Continue current medications, diet and exercise.   5.   4 2eeks

## 2019-05-23 NOTE — PROGRESS NOTES
Chronic Disease Visit Information    BP Readings from Last 3 Encounters:   05/03/19 (!) 145/80   04/12/19 (!) 143/59   03/18/19 139/86          Hemoglobin A1C (%)   Date Value   08/21/2018 6.3   07/18/2016 5.7   08/06/2015 5.5     Microalb/Crt. Ratio (mcg/mg creat)   Date Value   08/05/2015 11     LDL Cholesterol (mg/dL)   Date Value   05/20/2019 42     HDL (mg/dL)   Date Value   05/20/2019 48     BUN (mg/dL)   Date Value   05/20/2019 28 (H)     CREATININE (mg/dL)   Date Value   05/20/2019 0.87     Glucose (mg/dL)   Date Value   05/20/2019 95            Have you changed or started any medications since your last visit including any over-the-counter medicines, vitamins, or herbal medicines? no   Are you having any side effects from any of your medications? -  no  Have you stopped taking any of your medications? Is so, why? -  no    Have you seen any other physician or provider since your last visit? No  Have you had any other diagnostic tests since your last visit? Yes - Records Obtained  Have you been seen in the emergency room and/or had an admission to a hospital since we last saw you? No  Have you had your annual diabetic retinal (eye) exam? No  Have you had your routine dental cleaning in the past 6 months? no    Have you activated your [employer] account? If not, what are your barriers?  Yes     Patient Care Team:  Patience Vegas MD as PCP - Alicja Schwartz MD as PCP - S Attributed Provider         Medical History Review  Past Medical, Family, and Social History reviewed and does contribute to the patient presenting condition  Chief Complaint   Patient presents with    Diabetes     due for a1c     Hypertension     management     Anemia     lab results      Health Maintenance   Topic Date Due    Diabetic foot exam  01/18/1961    Diabetic retinal exam  01/18/1961    DTaP/Tdap/Td vaccine (1 - Tdap) 01/18/1970    Shingles Vaccine (1 of 2) 01/18/2001    Breast cancer screen  08/04/2019    A1C test (Diabetic or Prediabetic)  08/21/2019    Flu vaccine (Season Ended) 09/01/2019    Lipid screen  05/20/2020    Potassium monitoring  05/20/2020    Creatinine monitoring  05/20/2020    Colon cancer screen colonoscopy  10/07/2026    DEXA (modify frequency per FRAX score)  Completed    Pneumococcal 65+ years Vaccine  Completed    Hepatitis C screen  Completed

## 2019-05-24 RX ORDER — MELOXICAM 15 MG/1
15 TABLET ORAL DAILY
Qty: 90 TABLET | Refills: 3 | Status: SHIPPED | OUTPATIENT
Start: 2019-05-24 | End: 2019-06-11

## 2019-05-24 RX ORDER — CHOLESTYRAMINE 4 G/9G
1 POWDER, FOR SUSPENSION ORAL 2 TIMES DAILY
Qty: 90 PACKET | Refills: 3 | Status: SHIPPED | OUTPATIENT
Start: 2019-05-24 | End: 2019-08-19

## 2019-05-29 DIAGNOSIS — D50.8 IRON DEFICIENCY ANEMIA SECONDARY TO INADEQUATE DIETARY IRON INTAKE: ICD-10-CM

## 2019-05-29 LAB
CONTROL: PRESENT
HEMOCCULT STL QL: NEGATIVE

## 2019-05-30 ENCOUNTER — APPOINTMENT (OUTPATIENT)
Dept: GENERAL RADIOLOGY | Age: 69
DRG: 682 | End: 2019-05-30
Payer: MEDICARE

## 2019-05-30 ENCOUNTER — ANESTHESIA EVENT (OUTPATIENT)
Dept: OPERATING ROOM | Age: 69
DRG: 682 | End: 2019-05-30
Payer: MEDICARE

## 2019-05-30 ENCOUNTER — HOSPITAL ENCOUNTER (INPATIENT)
Age: 69
LOS: 3 days | Discharge: HOME HEALTH | DRG: 682 | End: 2019-06-02
Attending: EMERGENCY MEDICINE | Admitting: INTERNAL MEDICINE
Payer: MEDICARE

## 2019-05-30 ENCOUNTER — APPOINTMENT (OUTPATIENT)
Dept: CT IMAGING | Age: 69
DRG: 682 | End: 2019-05-30
Payer: MEDICARE

## 2019-05-30 ENCOUNTER — APPOINTMENT (OUTPATIENT)
Dept: ULTRASOUND IMAGING | Age: 69
DRG: 682 | End: 2019-05-30
Payer: MEDICARE

## 2019-05-30 DIAGNOSIS — S01.01XA LACERATION OF SCALP, INITIAL ENCOUNTER: ICD-10-CM

## 2019-05-30 DIAGNOSIS — N17.9 AKI (ACUTE KIDNEY INJURY) (HCC): ICD-10-CM

## 2019-05-30 DIAGNOSIS — S09.90XA CLOSED HEAD INJURY, INITIAL ENCOUNTER: Primary | ICD-10-CM

## 2019-05-30 LAB
-: ABNORMAL
ABSOLUTE EOS #: 0.4 K/UL (ref 0–0.4)
ABSOLUTE IMMATURE GRANULOCYTE: ABNORMAL K/UL (ref 0–0.3)
ABSOLUTE LYMPH #: 1.1 K/UL (ref 1–4.8)
ABSOLUTE MONO #: 0.8 K/UL (ref 0.1–1.3)
AMORPHOUS: ABNORMAL
ANION GAP SERPL CALCULATED.3IONS-SCNC: 13 MMOL/L (ref 9–17)
BACTERIA: ABNORMAL
BASOPHILS # BLD: 1 % (ref 0–2)
BASOPHILS ABSOLUTE: 0 K/UL (ref 0–0.2)
BILIRUBIN URINE: ABNORMAL
BNP INTERPRETATION: NORMAL
BUN BLDV-MCNC: 80 MG/DL (ref 8–23)
BUN/CREAT BLD: ABNORMAL (ref 9–20)
CALCIUM SERPL-MCNC: 8.2 MG/DL (ref 8.6–10.4)
CASTS UA: ABNORMAL /LPF
CASTS UA: ABNORMAL /LPF
CHLORIDE BLD-SCNC: 107 MMOL/L (ref 98–107)
CK MB: 44.8 NG/ML
CO2: 20 MMOL/L (ref 20–31)
COLOR: YELLOW
COMMENT UA: ABNORMAL
CREAT SERPL-MCNC: 3.54 MG/DL (ref 0.5–0.9)
CREATININE URINE: 236.1 MG/DL (ref 28–217)
CRYSTALS, UA: ABNORMAL /HPF
DIFFERENTIAL TYPE: ABNORMAL
EOSINOPHILS RELATIVE PERCENT: 4 % (ref 0–4)
EPITHELIAL CELLS UA: ABNORMAL /HPF
GFR AFRICAN AMERICAN: 16 ML/MIN
GFR NON-AFRICAN AMERICAN: 13 ML/MIN
GFR SERPL CREATININE-BSD FRML MDRD: ABNORMAL ML/MIN/{1.73_M2}
GFR SERPL CREATININE-BSD FRML MDRD: ABNORMAL ML/MIN/{1.73_M2}
GLUCOSE BLD-MCNC: 127 MG/DL (ref 70–99)
GLUCOSE URINE: NEGATIVE
HCT VFR BLD CALC: 32.3 % (ref 36–46)
HEMOGLOBIN: 10.5 G/DL (ref 12–16)
IMMATURE GRANULOCYTES: ABNORMAL %
KETONES, URINE: NEGATIVE
LEUKOCYTE ESTERASE, URINE: NEGATIVE
LYMPHOCYTES # BLD: 14 % (ref 24–44)
MCH RBC QN AUTO: 31.2 PG (ref 26–34)
MCHC RBC AUTO-ENTMCNC: 32.6 G/DL (ref 31–37)
MCV RBC AUTO: 95.6 FL (ref 80–100)
MONOCYTES # BLD: 10 % (ref 1–7)
MUCUS: ABNORMAL
MYOGLOBIN: 593 NG/ML (ref 25–58)
NITRITE, URINE: NEGATIVE
NRBC AUTOMATED: ABNORMAL PER 100 WBC
OTHER OBSERVATIONS UA: ABNORMAL
PDW BLD-RTO: 14.4 % (ref 11.5–14.9)
PH UA: 5 (ref 5–8)
PLATELET # BLD: 297 K/UL (ref 150–450)
PLATELET ESTIMATE: ABNORMAL
PMV BLD AUTO: 7.6 FL (ref 6–12)
POTASSIUM SERPL-SCNC: 4.5 MMOL/L (ref 3.7–5.3)
PRO-BNP: 173 PG/ML
PROTEIN UA: NEGATIVE
RBC # BLD: 3.38 M/UL (ref 4–5.2)
RBC # BLD: ABNORMAL 10*6/UL
RBC UA: ABNORMAL /HPF
RENAL EPITHELIAL, UA: ABNORMAL /HPF
SEG NEUTROPHILS: 71 % (ref 36–66)
SEGMENTED NEUTROPHILS ABSOLUTE COUNT: 5.6 K/UL (ref 1.3–9.1)
SODIUM BLD-SCNC: 140 MMOL/L (ref 135–144)
SODIUM,UR: 45 MMOL/L
SPECIFIC GRAVITY UA: 1.02 (ref 1–1.03)
TOTAL CK: 1203 U/L (ref 26–192)
TRICHOMONAS: ABNORMAL
TROPONIN INTERP: ABNORMAL
TROPONIN INTERP: ABNORMAL
TROPONIN T: ABNORMAL NG/ML
TROPONIN T: ABNORMAL NG/ML
TROPONIN, HIGH SENSITIVITY: 121 NG/L (ref 0–14)
TROPONIN, HIGH SENSITIVITY: 130 NG/L (ref 0–14)
TURBIDITY: ABNORMAL
UREA NITROGEN, UR: 577 MG/DL
URINE HGB: NEGATIVE
UROBILINOGEN, URINE: NORMAL
WBC # BLD: 7.9 K/UL (ref 3.5–11)
WBC # BLD: ABNORMAL 10*3/UL
WBC UA: ABNORMAL /HPF
YEAST: ABNORMAL

## 2019-05-30 PROCEDURE — 83874 ASSAY OF MYOGLOBIN: CPT

## 2019-05-30 PROCEDURE — 73130 X-RAY EXAM OF HAND: CPT

## 2019-05-30 PROCEDURE — 36415 COLL VENOUS BLD VENIPUNCTURE: CPT

## 2019-05-30 PROCEDURE — 72125 CT NECK SPINE W/O DYE: CPT

## 2019-05-30 PROCEDURE — 93005 ELECTROCARDIOGRAM TRACING: CPT | Performed by: EMERGENCY MEDICINE

## 2019-05-30 PROCEDURE — 73521 X-RAY EXAM HIPS BI 2 VIEWS: CPT

## 2019-05-30 PROCEDURE — 70450 CT HEAD/BRAIN W/O DYE: CPT

## 2019-05-30 PROCEDURE — 2060000000 HC ICU INTERMEDIATE R&B

## 2019-05-30 PROCEDURE — 2500000003 HC RX 250 WO HCPCS: Performed by: EMERGENCY MEDICINE

## 2019-05-30 PROCEDURE — 99285 EMERGENCY DEPT VISIT HI MDM: CPT

## 2019-05-30 PROCEDURE — 83880 ASSAY OF NATRIURETIC PEPTIDE: CPT

## 2019-05-30 PROCEDURE — 85025 COMPLETE CBC W/AUTO DIFF WBC: CPT

## 2019-05-30 PROCEDURE — 80048 BASIC METABOLIC PNL TOTAL CA: CPT

## 2019-05-30 PROCEDURE — 2580000003 HC RX 258: Performed by: EMERGENCY MEDICINE

## 2019-05-30 PROCEDURE — 82553 CREATINE MB FRACTION: CPT

## 2019-05-30 PROCEDURE — 81001 URINALYSIS AUTO W/SCOPE: CPT

## 2019-05-30 PROCEDURE — 76770 US EXAM ABDO BACK WALL COMP: CPT

## 2019-05-30 PROCEDURE — 82550 ASSAY OF CK (CPK): CPT

## 2019-05-30 PROCEDURE — 71046 X-RAY EXAM CHEST 2 VIEWS: CPT

## 2019-05-30 PROCEDURE — 2580000003 HC RX 258: Performed by: SURGERY

## 2019-05-30 PROCEDURE — 51798 US URINE CAPACITY MEASURE: CPT

## 2019-05-30 PROCEDURE — 99222 1ST HOSP IP/OBS MODERATE 55: CPT | Performed by: INTERNAL MEDICINE

## 2019-05-30 PROCEDURE — 6370000000 HC RX 637 (ALT 250 FOR IP): Performed by: INTERNAL MEDICINE

## 2019-05-30 PROCEDURE — 99223 1ST HOSP IP/OBS HIGH 75: CPT | Performed by: PSYCHIATRY & NEUROLOGY

## 2019-05-30 PROCEDURE — 84540 ASSAY OF URINE/UREA-N: CPT

## 2019-05-30 PROCEDURE — 82570 ASSAY OF URINE CREATININE: CPT

## 2019-05-30 PROCEDURE — 84484 ASSAY OF TROPONIN QUANT: CPT

## 2019-05-30 PROCEDURE — 6360000002 HC RX W HCPCS: Performed by: INTERNAL MEDICINE

## 2019-05-30 PROCEDURE — 6370000000 HC RX 637 (ALT 250 FOR IP): Performed by: EMERGENCY MEDICINE

## 2019-05-30 PROCEDURE — 84300 ASSAY OF URINE SODIUM: CPT

## 2019-05-30 PROCEDURE — 99223 1ST HOSP IP/OBS HIGH 75: CPT | Performed by: INTERNAL MEDICINE

## 2019-05-30 RX ORDER — GINSENG 100 MG
CAPSULE ORAL ONCE
Status: COMPLETED | OUTPATIENT
Start: 2019-05-30 | End: 2019-05-30

## 2019-05-30 RX ORDER — SODIUM CHLORIDE 9 MG/ML
INJECTION, SOLUTION INTRAVENOUS CONTINUOUS
Status: ACTIVE | OUTPATIENT
Start: 2019-05-30 | End: 2019-06-01

## 2019-05-30 RX ORDER — CARVEDILOL 12.5 MG/1
12.5 TABLET ORAL 2 TIMES DAILY
Status: DISCONTINUED | OUTPATIENT
Start: 2019-05-30 | End: 2019-06-02 | Stop reason: HOSPADM

## 2019-05-30 RX ORDER — GABAPENTIN 300 MG/1
300 CAPSULE ORAL NIGHTLY
Status: DISCONTINUED | OUTPATIENT
Start: 2019-05-30 | End: 2019-06-02 | Stop reason: HOSPADM

## 2019-05-30 RX ORDER — CHOLESTYRAMINE LIGHT 4 G/5.7G
1 POWDER, FOR SUSPENSION ORAL 2 TIMES DAILY
Status: DISCONTINUED | OUTPATIENT
Start: 2019-05-30 | End: 2019-06-02 | Stop reason: HOSPADM

## 2019-05-30 RX ORDER — ONDANSETRON 2 MG/ML
4 INJECTION INTRAMUSCULAR; INTRAVENOUS EVERY 6 HOURS PRN
Status: DISCONTINUED | OUTPATIENT
Start: 2019-05-30 | End: 2019-06-02 | Stop reason: HOSPADM

## 2019-05-30 RX ORDER — FAMOTIDINE 20 MG/1
20 TABLET, FILM COATED ORAL 2 TIMES DAILY
Status: DISCONTINUED | OUTPATIENT
Start: 2019-05-30 | End: 2019-05-31

## 2019-05-30 RX ORDER — SODIUM CHLORIDE 0.9 % (FLUSH) 0.9 %
10 SYRINGE (ML) INJECTION EVERY 12 HOURS SCHEDULED
Status: DISCONTINUED | OUTPATIENT
Start: 2019-05-30 | End: 2019-06-02 | Stop reason: HOSPADM

## 2019-05-30 RX ORDER — NITROGLYCERIN 0.4 MG/1
0.4 TABLET SUBLINGUAL EVERY 5 MIN PRN
Status: DISCONTINUED | OUTPATIENT
Start: 2019-05-30 | End: 2019-06-02 | Stop reason: HOSPADM

## 2019-05-30 RX ORDER — HYDRALAZINE HYDROCHLORIDE 50 MG/1
100 TABLET, FILM COATED ORAL EVERY 8 HOURS SCHEDULED
Status: DISCONTINUED | OUTPATIENT
Start: 2019-05-30 | End: 2019-06-02 | Stop reason: HOSPADM

## 2019-05-30 RX ORDER — CITALOPRAM 10 MG/1
10 TABLET ORAL DAILY
Status: DISCONTINUED | OUTPATIENT
Start: 2019-05-30 | End: 2019-06-02 | Stop reason: HOSPADM

## 2019-05-30 RX ORDER — PRAMIPEXOLE DIHYDROCHLORIDE 1.5 MG/1
1.5 TABLET ORAL DAILY
Status: DISCONTINUED | OUTPATIENT
Start: 2019-05-30 | End: 2019-05-31

## 2019-05-30 RX ORDER — ASPIRIN 81 MG/1
324 TABLET, CHEWABLE ORAL ONCE
Status: COMPLETED | OUTPATIENT
Start: 2019-05-30 | End: 2019-05-30

## 2019-05-30 RX ORDER — LIDOCAINE HYDROCHLORIDE AND EPINEPHRINE 10; 10 MG/ML; UG/ML
20 INJECTION, SOLUTION INFILTRATION; PERINEURAL ONCE
Status: COMPLETED | OUTPATIENT
Start: 2019-05-30 | End: 2019-05-30

## 2019-05-30 RX ORDER — HEPARIN SODIUM 5000 [USP'U]/ML
5000 INJECTION, SOLUTION INTRAVENOUS; SUBCUTANEOUS 2 TIMES DAILY
Status: DISCONTINUED | OUTPATIENT
Start: 2019-05-30 | End: 2019-06-02 | Stop reason: HOSPADM

## 2019-05-30 RX ORDER — SODIUM CHLORIDE 0.9 % (FLUSH) 0.9 %
10 SYRINGE (ML) INJECTION PRN
Status: DISCONTINUED | OUTPATIENT
Start: 2019-05-30 | End: 2019-06-02 | Stop reason: HOSPADM

## 2019-05-30 RX ORDER — 0.9 % SODIUM CHLORIDE 0.9 %
1000 INTRAVENOUS SOLUTION INTRAVENOUS ONCE
Status: COMPLETED | OUTPATIENT
Start: 2019-05-30 | End: 2019-05-30

## 2019-05-30 RX ORDER — OXYCODONE HYDROCHLORIDE AND ACETAMINOPHEN 5; 325 MG/1; MG/1
1 TABLET ORAL EVERY 8 HOURS PRN
Status: DISCONTINUED | OUTPATIENT
Start: 2019-05-30 | End: 2019-06-02 | Stop reason: HOSPADM

## 2019-05-30 RX ADMIN — GABAPENTIN 300 MG: 300 CAPSULE ORAL at 22:23

## 2019-05-30 RX ADMIN — HYDRALAZINE HYDROCHLORIDE 100 MG: 50 TABLET, FILM COATED ORAL at 22:22

## 2019-05-30 RX ADMIN — LIDOCAINE HYDROCHLORIDE,EPINEPHRINE BITARTRATE 20 ML: 10; .01 INJECTION, SOLUTION INFILTRATION; PERINEURAL at 07:50

## 2019-05-30 RX ADMIN — ASPIRIN 81 MG 324 MG: 81 TABLET ORAL at 07:51

## 2019-05-30 RX ADMIN — FAMOTIDINE 20 MG: 20 TABLET, FILM COATED ORAL at 22:22

## 2019-05-30 RX ADMIN — CITALOPRAM HYDROBROMIDE 10 MG: 10 TABLET ORAL at 12:32

## 2019-05-30 RX ADMIN — OXYCODONE HYDROCHLORIDE AND ACETAMINOPHEN 1 TABLET: 5; 325 TABLET ORAL at 16:17

## 2019-05-30 RX ADMIN — HYDRALAZINE HYDROCHLORIDE 100 MG: 50 TABLET, FILM COATED ORAL at 16:18

## 2019-05-30 RX ADMIN — HEPARIN SODIUM 5000 UNITS: 5000 INJECTION, SOLUTION INTRAVENOUS; SUBCUTANEOUS at 16:17

## 2019-05-30 RX ADMIN — CARVEDILOL 12.5 MG: 12.5 TABLET, FILM COATED ORAL at 22:23

## 2019-05-30 RX ADMIN — SODIUM CHLORIDE 1000 ML: 9 INJECTION, SOLUTION INTRAVENOUS at 07:51

## 2019-05-30 RX ADMIN — LINAGLIPTIN 5 MG: 5 TABLET, FILM COATED ORAL at 12:31

## 2019-05-30 RX ADMIN — FAMOTIDINE 20 MG: 20 TABLET, FILM COATED ORAL at 12:31

## 2019-05-30 RX ADMIN — SODIUM CHLORIDE: 9 INJECTION, SOLUTION INTRAVENOUS at 12:34

## 2019-05-30 RX ADMIN — BACITRACIN: 500 OINTMENT TOPICAL at 09:13

## 2019-05-30 RX ADMIN — CARVEDILOL 12.5 MG: 12.5 TABLET, FILM COATED ORAL at 12:31

## 2019-05-30 RX ADMIN — CHOLESTYRAMINE 4 G: 4 POWDER, FOR SUSPENSION ORAL at 12:32

## 2019-05-30 RX ADMIN — Medication 10 ML: at 12:33

## 2019-05-30 ASSESSMENT — ENCOUNTER SYMPTOMS
CONSTIPATION: 0
SHORTNESS OF BREATH: 1
BLOOD IN STOOL: 0
NAUSEA: 0
SORE THROAT: 0
TROUBLE SWALLOWING: 0
SHORTNESS OF BREATH: 0
BACK PAIN: 0
VOMITING: 0
COUGH: 0
DIARRHEA: 0
COLOR CHANGE: 0
ABDOMINAL PAIN: 0

## 2019-05-30 ASSESSMENT — PAIN SCALES - GENERAL
PAINLEVEL_OUTOF10: 7
PAINLEVEL_OUTOF10: 0
PAINLEVEL_OUTOF10: 6
PAINLEVEL_OUTOF10: 5

## 2019-05-30 ASSESSMENT — PAIN DESCRIPTION - LOCATION: LOCATION: HEAD

## 2019-05-30 NOTE — PROGRESS NOTES
[address] [facility]   OCCUPATIONAL THERAPY MISSED TREATMENT NOTE   INPATIENT   Date: 19  Patient Name: Emmy Aguila       Room:   MRN: [id number]   Account #: [de-identified]    : 1950  (78 y.o.)  Gender: female                 REASON FOR MISSED TREATMENT:  Patient at testing and/or off the floor         Hao Brito OT

## 2019-05-30 NOTE — ED NOTES
Report given to MEENU Flowers from [facility]. Report method in person   The following was reviewed with receiving RN:   Current vital signs:  BP (!) 128/46   Pulse 82   Temp 98.8 °F (37.1 °C) (Oral)   Resp 15   Ht 5' 4\" (1.626 m)   Wt 180 lb (81.6 kg)   SpO2 95%   BMI 30.90 kg/m²                MEWS Score: 1     Any medication or safety alerts were reviewed. Any pending diagnostics and notifications were also reviewed, as well as any safety concerns or issues, abnormal labs, abnormal imaging, and abnormal assessment findings. Questions were answered.             Hanane Shea RN  05/30/19 [id number]

## 2019-05-30 NOTE — ED NOTES
Report given to [facility], RN from this RN. Report method by phone. The following was reviewed with receiving RN:   Current vital signs:  BP (!) 101/39   Pulse 67   Temp 98.2 °F (36.8 °C) (Oral)   Resp 18   Ht 5' 4\" (1.626 m)   Wt 180 lb (81.6 kg)   SpO2 95%   BMI 30.90 kg/m²                MEWS Score: 1     Any medication or safety alerts were reviewed. Any pending diagnostics and notifications were also reviewed, as well as any safety concerns or issues, abnormal labs, abnormal imaging, and abnormal assessment findings. Questions were answered.           Jaida Richards RN  05/30/19 [id number]

## 2019-05-30 NOTE — CONSULTS
General Surgery Consult      Pt Name: Megan Hollis  MRN: [id number]  YOB: 1950  Date of evaluation: 5/30/2019  Primary Care Physician: Patience Vegas MD   Patient evaluated at the request of  Dr. Cony Quispe  Reason for evaluation:     SUBJECTIVE:   History of Chief Complaint:    Megan Hollis is a 77 y.o. female who presents with history of fall and hurt herself on the left side of the head patient denied any history of loss of consciousness denies any history of numbness tingling confined to the fingers or any swelling confined to the back of the neck patient denies any other symptomatology past medical history does reveal history of diabetes and hypertension for which she is on medication    Past Medical History   has a past medical history of Allergic rhinitis, cause unspecified, Back pain, Bowel obstruction ([address] CAD (coronary artery disease), Cellulitis, Cellulitis, Diverticulosis of colon (without mention of hemorrhage), GERD (gastroesophageal reflux disease), History of MI (myocardial infarction), History of ovarian cyst, History of peritonitis, HTN (hypertension), Hx of blood clots, Hyperlipidemia, Intestinal or peritoneal adhesions with obstruction (postoperative) (postinfection) ([address] Kidney infection, Lateral epicondylitis  of elbow, Muscle strain, Other abnormal glucose, Restless legs syndrome (RLS), Vitamin D deficiency, and Wears glasses. Past Surgical History   has a past surgical history that includes Cardiac catheterization; Ovary removal (1970); colectomy (1969); Appendectomy (1968); Ovary removal (1971); Hysterectomy (1973); Abdomen surgery (1976); Cardiac catheterization (2005); Bunionectomy (Left); sinus surgery (2004); Colonoscopy; other surgical history (8/14/14); cyst removal (Right); and Wrist fracture surgery (Left, 3/5/2019). Medications  Prior to Admission medications    Medication Sig Start Date End Date Taking?  Authorizing Provider   cholestyramine (QUESTRAN) 4 g packet Take 1 packet by mouth 2 times daily 5/24/19   Caridad Hurd MD   SITagliptin (JANUVIA) 100 MG tablet Take 1 tablet by mouth daily 5/24/19   Caridad Hurd MD   meloxicam (MOBIC) 15 MG tablet Take 1 tablet by mouth daily 5/24/19   Caridad Hurd MD   oxyCODONE-acetaminophen (PERCOCET) 5-325 MG per tablet Take 1 tablet by mouth 2 times daily as needed for Pain for up to 30 days. 5/18/19 6/17/19  MAIK Chavez - CNP   acetaminophen (TYLENOL) 500 MG tablet Take 500 mg by mouth every 6 hours as needed for Pain    Historical Provider, MD   fenofibrate micronized (LOFIBRA) 134 MG capsule Take 1 capsule by mouth every morning (before breakfast) 3/28/19   Caridad Hurd MD   lisinopril (PRINIVIL;ZESTRIL) 40 MG tablet Take 1 tablet by mouth daily 3/28/19   Caridad Hurd MD   citalopram (CELEXA) 10 MG tablet Take 1 tablet by mouth daily 3/28/19   Caridad Hurd MD   spironolactone (ALDACTONE) 25 MG tablet Take 1 tablet by mouth daily 3/22/19   Caridad Hurd MD   carvedilol (COREG) 12.5 MG tablet Take 1 tablet by mouth 2 times daily 3/22/19   Caridad Hurd MD   apixaban (ELIQUIS) 5 MG TABS tablet Take 1 tablet by mouth 2 times daily 3/22/19   Kristopher Schwartz MD   gabapentin (NEURONTIN) 300 MG capsule Take 1 capsule by mouth nightly for 180 days. 3/22/19 9/18/19  Kristopher Schwartz MD   cyanocobalamin 1000 MCG/ML injection Inject 1 mL into the muscle once for 1 dose 3/18/19 5/14/19  Kristopher Schwartz MD   lidocaine (XYLOCAINE) 5 % ointment 4-5 times a day to your right thigh for pain.  3/15/19   Nehemias Miller MD   furosemide (LASIX) 40 MG tablet Take 1 tablet by mouth daily 2/19/19   Caridad Hurd MD   famotidine (PEPCID) 20 MG tablet Take 1 tablet by mouth 2 times daily 2/8/19   Caridad Hurd MD   pramipexole (MIRAPEX) 1 MG tablet Take 1.5 tablets by mouth daily 2/8/19   Caridad Hurd MD   atorvastatin (LIPITOR) 80 MG tablet Take 1 tablet by mouth nightly 2/8/19   Caridad Hurd MD   hydrALAZINE (APRESOLINE) 100 MG tablet Take 1 tablet by mouth every 8 hours 1/22/19   Raquel Wu MD   nitroGLYCERIN (NITROSTAT) 0.4 MG SL tablet Place 1 tablet under the tongue every 5 minutes as needed for Chest pain 1/8/18   Raquel Wu MD   vitamin D (CHOLECALCIFEROL) 1000 UNIT TABS tablet Take 1 tablet by mouth daily 1/8/18   Raquel Wu MD   cyclobenzaprine (FLEXERIL) 10 MG tablet Take 1 tablet by mouth 3 times daily as needed for Muscle spasms 1/8/18   Raquel Wu MD   Calcium Carbonate-Vitamin D (CALCIUM 500/D) 500-125 MG-UNIT TABS Take 1 tablet by mouth 2 times daily     Historical Provider, MD     Allergies  is allergic to bactrim [sulfamethoxazole-trimethoprim]; codeine; and seasonal.    Family History  family history includes Diabetes in her mother; Heart Disease in her brother, father, maternal grandmother, and mother; High Blood Pressure in her brother, mother, and sister; Stroke in her mother. Social History   reports that she quit smoking about 23 months ago. Her smoking use included cigarettes. She started smoking about 23 years ago. She has a 10.00 pack-year smoking history. She has never used smokeless tobacco. She reports that she does not drink alcohol or use drugs. Review of Systems:  General negative  Denies any fever or chills  HEENT negative  Denies any diplopia, tinnitus or vertigo  Resp negative  Denies any shortness of breath, cough or wheezing  Cardiac negative  Denies any chest pain, palpitations, claudication or edema  GI Normal  Denies any melena, hematochezia, hematemesis or pyrosis   negative  Denies any frequency, urgency, hesitancy or incontinence  Heme negative  Denies bruising or bleeding easily  Endocrine negative, Denies any history of diabetes or thyroid disease  Neuro negative  Denies any focal motor or sensory deficits    OBJECTIVE:   VITALS:  height is 5' 4\" (1.626 m) and weight is 180 lb (81.6 kg). Her oral temperature is 97.2 °F (36.2 °C).  Her blood pressure is 126/57 (abnormal) and her pulse is 68. Her respiration is 18 and oxygen saturation is 97%. CONSTITUTIONAL: awake, alert, cooperative, no apparent distress, and appears stated age and Alert and oriented times 3, no acute distress and cooperative to examination with proper mood and affect. SKIN: Skin color, texture, turgor normal. No rashes or lesions. , negatives: color normal  LYMPH: no cervical nodes, no supraclavicular nodes, no inguinal nodes  HEENT: PERRLA, EOMI, fundi benign, Head is normocephalic, atraumatic. EOMI, PERRLA  NECK: supple, symmetrical, trachea midline  CHEST/LUNGS: chest symmetric with normal A/P diameter, no chest deformities noted, normal respiratory rate and rhythm, lungs clear to auscultation without wheezes, rales or rhonchi. No accessory muscle use. Scars None   CARDIOVASCULAR: Heart sounds are normal.  Regular rate and rhythm without murmur, gallop or rub. Heart regular rate and rhythm Normal S1 and S2.  Regular rhythm. No murmurs, gallops, or rubs. and Normal S1 and S2. . Carotid and femoral pulses 2+/4 and equal bilaterally  ABDOMEN: obese and Normal shape. . left upper quadrant, No and Laparoscopic scar(s) present. Normal bowel sounds. No bruits. Abnormal bowel sounds: diminished  soft, nontender, nondistended, no masses or organomegaly  Soft, nondistended, no masses or organomegaly. diastasis of rectus, no evidence of hernia. Percussion: Normal without hepatosplenomegally. Abnormal percussion: tympanitic Tenderness: absent and RUQ  RECTAL: deferred, not clinically indicated, declined by patient  NEUROLOGIC: Awake, alert, oriented to name, place and time. Cranial nerves II-XII are grossly intact. Motor is 5 out of 5 bilaterally. Cerebellar finger to nose, heel to shin intact. Sensory is intact. Babinski down going, Romberg negative, and gait is normal.  There are no focalizing motor or sensory deficits. CN II-XII are grossly intact.   EXTREMITIES: no cyanosis, no clubbing, no edema and foot exam- normal color and temperature, no large calluses, ulcers or wounds    LABS:   CBC with Differential:    Lab Results   Component Value Date    WBC 7.9 05/30/2019    RBC 3.38 05/30/2019    HGB 10.5 05/30/2019    HCT 32.3 05/30/2019     05/30/2019    MCV 95.6 05/30/2019    MCH 31.2 05/30/2019    MCHC 32.6 05/30/2019    RDW 14.4 05/30/2019    LYMPHOPCT 14 05/30/2019    MONOPCT 10 05/30/2019    BASOPCT 1 05/30/2019    MONOSABS 0.80 05/30/2019    LYMPHSABS 1.10 05/30/2019    EOSABS 0.40 05/30/2019    BASOSABS 0.00 05/30/2019    DIFFTYPE NOT REPORTED 05/30/2019     BMP:    Lab Results   Component Value Date     05/30/2019    K 4.5 05/30/2019     05/30/2019    CO2 20 05/30/2019    BUN 80 05/30/2019    LABALBU 3.8 01/21/2018    CREATININE 3.54 05/30/2019    CALCIUM 8.2 05/30/2019    GFRAA 16 05/30/2019    LABGLOM 13 05/30/2019    GLUCOSE 127 05/30/2019     Hepatic Function Panel:    Lab Results   Component Value Date    ALKPHOS 32 01/21/2018    ALT 19 01/21/2018    AST 22 01/21/2018    PROT 6.4 12/04/2018    BILITOT 0.21 01/21/2018    LABALBU 3.8 01/21/2018     Calcium:    Lab Results   Component Value Date    CALCIUM 8.2 05/30/2019     Magnesium:    Lab Results   Component Value Date    MG 2.0 08/01/2017     Phosphorus:    Lab Results   Component Value Date    PHOS 3.7 08/01/2017     PT/INR:    Lab Results   Component Value Date    PROTIME 11.1 01/21/2018    INR 1.1 01/21/2018     ABG:  No results found for: PHART, PH, DYF8QFG, PCO2, PO2ART, PO2, JFE8FNB, HCO3, BEART, BE, THGBART, THB, TLQ4KSB, H7XMLDLI, O2SAT  Urine Culture:  No components found for: CURINE  Blood Culture:  No components found for: CBLOOD, CFUNGUSBL  Stool Culture:  No components found for: CSTOOL    RADIOLOGY:   I have personally reviewed the following films:  Xr Chest Standard (2 Vw)    Addendum Date: 5/30/2019    ADDENDUM: Stable mild cardiomegaly.      Result Date: 5/30/2019  EXAMINATION: TWO XRAY VIEWS OF THE CHEST 5/30/2019 7:31 am COMPARISON: January 21, 2018 HISTORY: ORDERING SYSTEM PROVIDED HISTORY: fall TECHNOLOGIST PROVIDED HISTORY: fall Ordering Physician Provided Reason for Exam: frequent recent falls Acuity: Acute Type of Exam: Initial FINDINGS: The cardiomediastinal silhouette is stable. The lung apices are obscured by head position. There is mild pulmonary vascular congestion. There is no pleural effusion. There is no pneumothorax. There is no acute osseous abnormality. Mild pulmonary vascular congestion. Xr Hand Left (min 3 Views)    Result Date: 5/30/2019  EXAMINATION: 3 XRAY VIEWS OF THE LEFT HAND 5/30/2019 7:31 am COMPARISON: 03/15/2019 HISTORY: ORDERING SYSTEM PROVIDED HISTORY: fall TECHNOLOGIST PROVIDED HISTORY: fall Ordering Physician Provided Reason for Exam: fall, pain Acuity: Acute Type of Exam: Initial FINDINGS: Partially visualized ORIF left distal radius. Avulsed ulnar styloid fracture fragments again noted. Moderate degenerative changes scattered throughout the hand and wrist.  Mild osteopenia. There is no convincing evidence of acute fracture or dislocation. No convincing evidence of acute fracture     Xr Hip Bilateral W Ap Pelvis (2 Views)    Result Date: 5/30/2019  EXAMINATION: ONE XRAY VIEW OF THE PELVIS AND TWO XRAY VIEWS OF EACH OF THE BILATERAL HIPS 5/30/2019 3:19 pm COMPARISON: 11 July 2018 HISTORY: ORDERING SYSTEM PROVIDED HISTORY: pain TECHNOLOGIST PROVIDED HISTORY: pain Ordering Physician Provided Reason for Exam: Pain Acuity: Acute Type of Exam: Initial Mechanism of Injury: Fall FINDINGS: Both femoral heads are well circumscribed and situated within the acetabula. Moderate degenerative changes are present in the hips, with mild degenerative changes in the SI joints and symphysis pubis. Suture material is present. No acute fracture, dislocation, or effusion is noted. Degenerative changes in the hips, stable. No acute osseous abnormality.      Ct Head Wo Contrast    Result Date: 5/30/2019  EXAMINATION: CT OF THE HEAD WITHOUT CONTRAST; CT OF THE CERVICAL SPINE WITHOUT CONTRAST 5/30/2019 7:19 am; 5/30/2019 7:22 am TECHNIQUE: CT of the head was performed without the administration of intravenous contrast. Dose modulation, iterative reconstruction, and/or weight based adjustment of the mA/kV was utilized to reduce the radiation dose to as low as reasonably achievable.; CT of the cervical spine was performed without the administration of intravenous contrast. Multiplanar reformatted images are provided for review. Dose modulation, iterative reconstruction, and/or weight based adjustment of the mA/kV was utilized to reduce the radiation dose to as low as reasonably achievable. COMPARISON: 07/29/2017, 08/05/2015 HISTORY: ORDERING SYSTEM PROVIDED HISTORY: fall TECHNOLOGIST PROVIDED HISTORY: Ordering Physician Provided Reason for Exam: Fall, laceration to head Acuity: Acute Relevant Medical/Surgical History: Patient has had sinus surgeries; ORDERING SYSTEM PROVIDED HISTORY: fall TECHNOLOGIST PROVIDED HISTORY: Ordering Physician Provided Reason for Exam: Fall, laceration to head Acuity: Acute Relevant Medical/Surgical History: no known surgeries to c-spine FINDINGS: CT HEAD: BRAIN/VENTRICLES: Ventricles normal in size and location. Basal cisterns normally outlined. No intra or extra-axial hemorrhage. No acute infarct. No abnormal fluid collections. ORBITS: The visualized portion of the orbits demonstrate no acute abnormality. SINUSES: The visualized paranasal sinuses and mastoid air cells demonstrate no acute abnormality. Stable polyp/mucous retention cyst in maxillary sinuses and fibro-osseous lesion along the posterolateral wall right maxillary sinus projecting into the sinus. SOFT TISSUES/SKULL:  No acute abnormality of the visualized skull or soft tissues. CT CERVICAL SPINE: BONES/ALIGNMENT: There is no evidence of an acute cervical spine fracture.  There is normal alignment of the cervical spine. DEGENERATIVE CHANGES: No significant degenerative changes. SOFT TISSUES: There is no prevertebral soft tissue swelling. There is large amount of soft tissue material in dilated esophagus presumed to be food debris. No acute intracranial abnormality. No acute traumatic injury of the cervical spine. Incidentally, visualized esophagus is dilated and filled with material presumed feeding food debris. This could be due to distal esophageal pathology not imaged on this exam.  Please correlate clinically. Further imaging as may be deemed appropriate. Ct Cervical Spine Wo Contrast    Result Date: 5/30/2019  EXAMINATION: CT OF THE HEAD WITHOUT CONTRAST; CT OF THE CERVICAL SPINE WITHOUT CONTRAST 5/30/2019 7:19 am; 5/30/2019 7:22 am TECHNIQUE: CT of the head was performed without the administration of intravenous contrast. Dose modulation, iterative reconstruction, and/or weight based adjustment of the mA/kV was utilized to reduce the radiation dose to as low as reasonably achievable.; CT of the cervical spine was performed without the administration of intravenous contrast. Multiplanar reformatted images are provided for review. Dose modulation, iterative reconstruction, and/or weight based adjustment of the mA/kV was utilized to reduce the radiation dose to as low as reasonably achievable. COMPARISON: 07/29/2017, 08/05/2015 HISTORY: ORDERING SYSTEM PROVIDED HISTORY: fall TECHNOLOGIST PROVIDED HISTORY: Ordering Physician Provided Reason for Exam: Fall, laceration to head Acuity: Acute Relevant Medical/Surgical History: Patient has had sinus surgeries; ORDERING SYSTEM PROVIDED HISTORY: fall TECHNOLOGIST PROVIDED HISTORY: Ordering Physician Provided Reason for Exam: Fall, laceration to head Acuity: Acute Relevant Medical/Surgical History: no known surgeries to c-spine FINDINGS: CT HEAD: BRAIN/VENTRICLES: Ventricles normal in size and location. Basal cisterns normally outlined.   No intra or extra-axial hemorrhage. No acute infarct. No abnormal fluid collections. ORBITS: The visualized portion of the orbits demonstrate no acute abnormality. SINUSES: The visualized paranasal sinuses and mastoid air cells demonstrate no acute abnormality. Stable polyp/mucous retention cyst in maxillary sinuses and fibro-osseous lesion along the posterolateral wall right maxillary sinus projecting into the sinus. SOFT TISSUES/SKULL:  No acute abnormality of the visualized skull or soft tissues. CT CERVICAL SPINE: BONES/ALIGNMENT: There is no evidence of an acute cervical spine fracture. There is normal alignment of the cervical spine. DEGENERATIVE CHANGES: No significant degenerative changes. SOFT TISSUES: There is no prevertebral soft tissue swelling. There is large amount of soft tissue material in dilated esophagus presumed to be food debris. No acute intracranial abnormality. No acute traumatic injury of the cervical spine. Incidentally, visualized esophagus is dilated and filled with material presumed feeding food debris. This could be due to distal esophageal pathology not imaged on this exam.  Please correlate clinically. Further imaging as may be deemed appropriate. Us Renal Complete    Result Date: 5/30/2019  EXAMINATION: RETROPERITONEAL ULTRASOUND OF THE KIDNEYS 5/30/2019 COMPARISON: CT December 18, 2018. Renal ultrasound July 31, 2017 HISTORY: ORDERING SYSTEM PROVIDED HISTORY: mitesh FINDINGS: Kidneys: The right kidney measures 11.2 cm in length and the left kidney measures 10.7 cm in length. Kidneys demonstrate normal cortical echogenicity. No evidence of hydronephrosis or intrarenal stones. Unremarkable ultrasound of the kidneys. IMPRESSION:   1. Local examination of the scalp does reveal evidence of large laceration confined to the left side of the temporoparietal area which is stapled at the present time there is no evidence of any bruising noted.     has CVA (cerebral vascular accident) ([facility] [address] and BIN (acute kidney injury) ([facility] [address] on their pertinent problem list.    PLAN:   1. Patient to have EGD to be done tomorrow by Dr. Dieudonne Marion      Thank you for this interesting consult and for allowing us to participate in the care of this patient. If you have any questions please don't hesitate to call.           Electronically signed by Steph Moran MD  on 5/30/2019 at 5:50 PM

## 2019-05-30 NOTE — ED NOTES
Dr. Clevester Kussmaul states that c-collar can be removed and patient may ambulated after orthostatic BP is completed.       Lisandro Francois RN  05/30/19 [id number]

## 2019-05-30 NOTE — ED NOTES
C= \"Have you ever felt that you should Cut down on your drinking? \"  No  A= \"Have people Annoyed you by criticizing your drinking? \"  No  G= \"Have you ever felt bad or Guilty about your drinking? \"  No  E= \"Have you ever had a drink as an Eye-opener first thing in the morning to steady your nerves or to help a hangover? \"  No      Deferred []      Reason for deferring: N/A    *If yes to two or more: probable alcohol abuse. Mona Harrison RN  05/30/19 [id number]

## 2019-05-30 NOTE — ED PROVIDER NOTES
[address]  eMERGENCY dEPARTMENT eNCOUnter    Pt Name: Ginny White  MRN: [id number]  YOB: 1950  Date of evaluation:5/30/19  PCP: Shant Crews MD    [address] [place] [address]       Chief Complaint   Patient presents with   [facility] Head Laceration       HISTORY OF PRESENT ILLNESS    Ginny White is a 77 y.o. female who presents after a fall. Patient states she fell to the ground and hit her head on the ground. She does not know how she fell. No dizziness or lightheadedness prior to the fall. States she laid on the ground for about a half hour before someone helped her up. She is complaining of neck and head pain at this time. Symptoms are acute. Pain is 5 out of 10 in severity and does not radiate. Symptoms don't have any alleviating or aggravating factors. Denies any chest pain right now. Also complaining of left hand pain from a prior fall. She does take blood thinners. Patient has no other complaints at this time. REVIEW OF SYSTEMS       Review of Systems   Constitutional: Negative for chills, fatigue and fever. HENT: Negative for congestion, ear pain, sore throat and trouble swallowing. Eyes: Negative for visual disturbance. Respiratory: Negative for cough and shortness of breath. Cardiovascular: Negative for chest pain, palpitations and leg swelling. Gastrointestinal: Negative for abdominal pain, blood in stool, constipation, diarrhea, nausea and vomiting. Genitourinary: Negative for dysuria and flank pain. Musculoskeletal: Negative for arthralgias, back pain, myalgias and neck pain. Skin: Positive for wound. Negative for color change and rash. Neurological: Positive for headaches. Negative for dizziness, weakness, light-headedness and numbness. Psychiatric/Behavioral: Negative for confusion. All other systems reviewed and are negative. Negativein 10 essential Systems except as mentioned above and in the HPI.         PAST MEDICAL HISTORY     Past Medical History:   Diagnosis Date    Allergic rhinitis, cause unspecified     Back pain     lumbar    Bowel obstruction (HCC)     history of due to scar tissue, resolved non-surgically    CAD (coronary artery disease)     Cellulitis     left leg    Cellulitis 2017 August    leg left leg/bug bite    Diverticulosis of colon (without mention of hemorrhage)     GERD (gastroesophageal reflux disease)     History of MI (myocardial infarction) 2005    thought due to a blood clot    History of ovarian cyst 1970    had oopherectomy    History of peritonitis 1968    due to ruptured appendix age 13    HTN (hypertension)     Hx of blood clots     right leg    Hyperlipidemia     Intestinal or peritoneal adhesions with obstruction (postoperative) (postinfection) ([address]     Kidney infection     renal failure/sepsis/spider bite    Lateral epicondylitis  of elbow     Muscle strain     right posterior shoulder    Other abnormal glucose     Restless legs syndrome (RLS)     Vitamin D deficiency     Wears glasses          SURGICAL HISTORY      has a past surgical history that includes Cardiac catheterization; Ovary removal (1970); colectomy (1969); Appendectomy (1968); Ovary removal (1971); Hysterectomy (1973); Abdomen surgery (1976); Cardiac catheterization (2005); Bunionectomy (Left); sinus surgery (2004); Colonoscopy; other surgical history (8/14/14); cyst removal (Right); and Wrist fracture surgery (Left, 3/5/2019).       CURRENT MEDICATIONS       Previous Medications    ACETAMINOPHEN (TYLENOL) 500 MG TABLET    Take 500 mg by mouth every 6 hours as needed for Pain    APIXABAN (ELIQUIS) 5 MG TABS TABLET    Take 1 tablet by mouth 2 times daily    ATORVASTATIN (LIPITOR) 80 MG TABLET    Take 1 tablet by mouth nightly    CALCIUM CARBONATE-VITAMIN D (CALCIUM 500/D) 500-125 MG-UNIT TABS    Take 1 tablet by mouth 2 times daily     CARVEDILOL (COREG) 12.5 MG TABLET    Take 1 tablet by mouth 2 times daily    CHOLESTYRAMINE Ervin [address]) 4 G PACKET    Take 1 packet by mouth 2 times daily    CITALOPRAM (CELEXA) 10 MG TABLET    Take 1 tablet by mouth daily    CYANOCOBALAMIN 1000 MCG/ML INJECTION    Inject 1 mL into the muscle once for 1 dose    CYCLOBENZAPRINE (FLEXERIL) 10 MG TABLET    Take 1 tablet by mouth 3 times daily as needed for Muscle spasms    FAMOTIDINE (PEPCID) 20 MG TABLET    Take 1 tablet by mouth 2 times daily    FENOFIBRATE MICRONIZED (LOFIBRA) 134 MG CAPSULE    Take 1 capsule by mouth every morning (before breakfast)    FUROSEMIDE (LASIX) 40 MG TABLET    Take 1 tablet by mouth daily    GABAPENTIN (NEURONTIN) 300 MG CAPSULE    Take 1 capsule by mouth nightly for 180 days. HYDRALAZINE (APRESOLINE) 100 MG TABLET    Take 1 tablet by mouth every 8 hours    LIDOCAINE (XYLOCAINE) 5 % OINTMENT    4-5 times a day to your right thigh for pain. LISINOPRIL (PRINIVIL;ZESTRIL) 40 MG TABLET    Take 1 tablet by mouth daily    MELOXICAM (MOBIC) 15 MG TABLET    Take 1 tablet by mouth daily    NITROGLYCERIN (NITROSTAT) 0.4 MG SL TABLET    Place 1 tablet under the tongue every 5 minutes as needed for Chest pain    OXYCODONE-ACETAMINOPHEN (PERCOCET) 5-325 MG PER TABLET    Take 1 tablet by mouth 2 times daily as needed for Pain for up to 30 days. PRAMIPEXOLE (MIRAPEX) 1 MG TABLET    Take 1.5 tablets by mouth daily    SITAGLIPTIN (JANUVIA) 100 MG TABLET    Take 1 tablet by mouth daily    SPIRONOLACTONE (ALDACTONE) 25 MG TABLET    Take 1 tablet by mouth daily    VITAMIN D (CHOLECALCIFEROL) 1000 UNIT TABS TABLET    Take 1 tablet by mouth daily       ALLERGIES     is allergic to bactrim [sulfamethoxazole-trimethoprim]; codeine; and seasonal.    FAMILY HISTORY     indicated that her mother is . She indicated that her father is . She indicated that the status of her sister is unknown. She indicated that her brother is . She indicated that her maternal grandmother is .      family history includes Diabetes in her mother; Heart Disease in her brother, father, maternal grandmother, and mother; High Blood Pressure in her brother, mother, and sister; Stroke in her mother. SOCIAL HISTORY      reports that she quit smoking about 23 months ago. Her smoking use included cigarettes. She started smoking about 23 years ago. She has a 10.00 pack-year smoking history. She has never used smokeless tobacco. She reports that she does not drink alcohol or use drugs. PHYSICAL EXAM     INITIAL VITALS:  height is 5' 4\" (1.626 m) and weight is 180 lb (81.6 kg). Her oral temperature is 98.2 °F (36.8 °C). Her blood pressure is 101/39 (abnormal) and her pulse is 67. Her respiration is 18 and oxygen saturation is 95%. Physical Exam   Constitutional: She is oriented to person, place, and time. No distress. HENT:   Head: Normocephalic and atraumatic. Eyes: Pupils are equal, round, and reactive to light. Conjunctivae are normal.   Neck: Neck supple. Cardiovascular: Normal rate, regular rhythm, normal heart sounds and intact distal pulses. No murmur heard. Pulmonary/Chest: Effort normal and breath sounds normal. No respiratory distress. Abdominal: Soft. Bowel sounds are normal. She exhibits no distension. There is no tenderness. Musculoskeletal: She exhibits no edema or tenderness. Cervical back: She exhibits bony tenderness. Lymphadenopathy:     She has no cervical adenopathy. Neurological: She is alert and oriented to person, place, and time. She has normal strength. No cranial nerve deficit or sensory deficit. She exhibits normal muscle tone. GCS eye subscore is 4. GCS verbal subscore is 5. GCS motor subscore is 6. Skin: Skin is warm and dry. Laceration (Left parietal scalp) noted. No rash noted. Psychiatric: Judgment normal.   Nursing note and vitals reviewed. DIFFERENTIAL DIAGNOSIS/MDM:   71-year-old female presents after a fall. She is afebrile and nontoxic. Vital signs are normal limits.   She is not ULTRASOUND:      LABS:  Labs Reviewed   TROPONIN - Abnormal; Notable for the following components:       Result Value    Troponin, High Sensitivity 121 (*)     All other components within normal limits   CBC WITH AUTO DIFFERENTIAL - Abnormal; Notable for the following components:    RBC 3.38 (*)     Hemoglobin 10.5 (*)     Hematocrit 32.3 (*)     Seg Neutrophils 71 (*)     Lymphocytes 14 (*)     Monocytes 10 (*)     All other components within normal limits   BASIC METABOLIC PANEL - Abnormal; Notable for the following components:    Glucose 127 (*)     BUN 80 (*)     CREATININE 3.54 (*)     Calcium 8.2 (*)     GFR Non- 13 (*)     GFR  16 (*)     All other components within normal limits   CK-MB - Abnormal; Notable for the following components:    CK-MB 44.8 (*)     All other components within normal limits   MYOGLOBIN, SERUM - Abnormal; Notable for the following components:    Myoglobin 593 (*)     All other components within normal limits   CK - Abnormal; Notable for the following components: Total CK 1,203 (*)     All other components within normal limits   BRAIN NATRIURETIC PEPTIDE   TROPONIN   URINE RT REFLEX TO CULTURE   SODIUM, URINE, RANDOM   CREATININE, RANDOM URINE   UREA NITROGEN, URINE         EMERGENCY DEPARTMENT COURSE:   Vitals:    Vitals:    05/30/19 0745 05/30/19 0800 05/30/19 0830 05/30/19 0843   BP: (!) 101/42 (!) 105/40 (!) 101/39    Pulse: 71 71 67    Resp: 15 16 14 18   Temp:    98.2 °F (36.8 °C)   TempSrc:    Oral   SpO2: 96% 94% 95% 95%   Weight:       Height:         8:25 AM  CT head and cervical spine are unremarkable. Laceration closed as below. Patient found to be in new onset renal failure. I spoke with Dr. Gil Mora nephrology. He is requesting 1 L of IV fluids and after that 125 mL of normal saline. He also requested urine studies as well as a kidney ultrasound. No emergent need for dialysis at this time.   She has no EKG changes and potassium is normal.  I spoke with Dr. Lexie Matthew trauma surgery who will admit the patient primarily. Troponin elevated in the 120s however patient has no signs of ischemia on EKG. She is not having any chest pain. She was given aspirin. 8:57 AM  Spoke with Dr. Lanie Omer will see the patient as a medical consult as well. 9:05 AM  Orthostatic vital signs negative per nursing. Patient clinically stable at this time. Cervical collar was removed. CRITICALCARE:  CRITICAL CARE: There was a high probability of clinically significant/life threatening deterioration in this patient's condition which required my urgent intervention. Total critical care time was 35 minutes. This excludes any time for separately reportable procedures. CONSULTS:  IP CONSULT TO NEPHROLOGY  IP CONSULT TO TRAUMA SURGERY  IP CONSULT TO INTERNAL MEDICINE      PROCEDURES:  Laceration Repair Procedure Note    Indication: Laceration scalp    Procedure: The patient was placed in the appropriate position and anesthesia around the laceration was obtained by infiltration using 1% Lidocaine with epinephrine. The area was then irrigated with normal saline. The laceration was closed with staples. There were no additional lacerations requiring repair. The wound area was then dressed with bacitracin and a bandage. Total repaired wound length: 7 cm. Other Items: Staple count: 14    The patient tolerated the procedure well. Complications: None        FINAL IMPRESSION      1. Closed head injury, initial encounter    2. Laceration of scalp, initial encounter    3.  BIN (acute kidney injury) [facility])            [address] Admitted 05/30/2019 08:24:39 AM          PATIENT REFERRED TO:  Rodney Hines MD  [address] [place] [address]  [phone]            DISCHARGE MEDICATIONS:  New Prescriptions    No medications on file       (Please note that portions ofthis note were completed with a voice recognition program. Efforts were made to edit the dictations but occasionally words are mis-transcribed.)    Yoli Epstein,   Attending Emergency Physician          Yoli Epstein, DO  05/30/19 [facility], DO  05/30/19 [address] [place] [address], DO  05/30/19 [id number]

## 2019-05-30 NOTE — FLOWSHEET NOTE
Writer discussed with patient her HCPOA and Living Will documents; patient's  has these documents at home; patient will advise her  to bring documents to the hospital asap; listening presence and support;     05/30/19 6719   Encounter Summary   Services provided to: Patient   Referral/Consult From: Nurse;Rounding   Support System Spouse   Continue Visiting   (5/30/19)   Complexity of Encounter Moderate   Length of Encounter 15 minutes   Spiritual Assessment Completed Yes   Spiritual/Congregational   Type Spiritual support   Assessment Approachable; Anxious; Hopeful;Coping   Intervention Active listening;Prayer;Sustaining presence/ Ministry of presence   Outcome Expressed gratitude;Engaged in conversation;Expressed feelings/needs/concerns;Coping; Hopeful;Receptive   Advance Directives (For Healthcare)   Healthcare Directive Yes, patient has an advance directive for healthcare treatment   Type of Healthcare Directive Durable power of  for health care;Living will   Copy in Chart No, copy requested from family

## 2019-05-30 NOTE — PROGRESS NOTES
Physical Therapy  DATE: 2019    NAME: Lilia Peace  MRN: [id number]   : 1950    Patient not seen this date for Physical Therapy due to:  [] Blood transfusion in progress  [] Hemodialysis  []  Patient Declined  [] Spine Precautions   [] Strict Bedrest  [] Surgery/ Procedure  [] Testing      [x] Other Off the floor for testing. [] PT being discontinued at this time. Patient independent. No further needs. [] PT being discontinued at this time as the patient has been transferred to palliative care. No further needs.     Terrell Wynn, PT

## 2019-05-30 NOTE — CONSULTS
[facility] Internal Medicine    CONSULTATION / HISTORY AND PHYSICAL EXAMINATION            Date:   5/30/2019  Patient name:  Lincoln Landry  Date of admission:  5/30/2019  6:48 AM  MRN:   [id number]  Account:  [de-identified]  YOB: 1950  PCP:    Will Vital MD  Room:   2121/2121-01  Code Status:    Full Code    Physician Requesting Consult: Rocael Dunne MD    Reason for Consult:  medmanagement    Chief Complaint:     Chief Complaint   Patient presents with   Anival Saliva    Head Laceration       History Obtained From:     pt    History of Present Illness:     Patient is a 59-year-old morbidly obese lady with a history of CVA TIA coronary artery disease with a stent with a normal renal functions in the past has been complaining of frequent falls from last few months for this typically from poor balance and walks with a walker leaning forward has seen a neurologist had MRI of the brain and lumbar spine and both done  Denies any chest pain and the nausea vomiting  No aggravating relieving factors    Past Medical History:     Past Medical History:   Diagnosis Date    Allergic rhinitis, cause unspecified     Back pain     lumbar    Bowel obstruction (HCC)     history of due to scar tissue, resolved non-surgically    CAD (coronary artery disease)     Cellulitis     left leg    Cellulitis 2017 August    leg left leg/bug bite    Diverticulosis of colon (without mention of hemorrhage)     GERD (gastroesophageal reflux disease)     History of MI (myocardial infarction) 2005    thought due to a blood clot    History of ovarian cyst 1970    had oopherectomy    History of peritonitis 1968    due to ruptured appendix age 13    HTN (hypertension)     Hx of blood clots     right leg    Hyperlipidemia     Intestinal or peritoneal adhesions with obstruction (postoperative) (postinfection) ([address]     Kidney infection     renal failure/sepsis/spider bite    Lateral mouth daily 3/28/19   Andres Miranda MD   citalopram (CELEXA) 10 MG tablet Take 1 tablet by mouth daily 3/28/19   Andres Miranda MD   spironolactone (ALDACTONE) 25 MG tablet Take 1 tablet by mouth daily 3/22/19   Andres Miranda MD   carvedilol (COREG) 12.5 MG tablet Take 1 tablet by mouth 2 times daily 3/22/19   Andres Miranda MD   apixaban (ELIQUIS) 5 MG TABS tablet Take 1 tablet by mouth 2 times daily 3/22/19   Andres Miranda MD   gabapentin (NEURONTIN) 300 MG capsule Take 1 capsule by mouth nightly for 180 days. 3/22/19 9/18/19  Nenita Schwartz MD   cyanocobalamin 1000 MCG/ML injection Inject 1 mL into the muscle once for 1 dose 3/18/19 5/14/19  Neinta Schwartz MD   lidocaine (XYLOCAINE) 5 % ointment 4-5 times a day to your right thigh for pain. 3/15/19   David Jennings MD   furosemide (LASIX) 40 MG tablet Take 1 tablet by mouth daily 2/19/19   Andres Miranda MD   famotidine (PEPCID) 20 MG tablet Take 1 tablet by mouth 2 times daily 2/8/19   Andres Miranda MD   pramipexole (MIRAPEX) 1 MG tablet Take 1.5 tablets by mouth daily 2/8/19   Andres Miranda MD   atorvastatin (LIPITOR) 80 MG tablet Take 1 tablet by mouth nightly 2/8/19   Andres Miranda MD   hydrALAZINE (APRESOLINE) 100 MG tablet Take 1 tablet by mouth every 8 hours 1/22/19   Andres Miranda MD   nitroGLYCERIN (NITROSTAT) 0.4 MG SL tablet Place 1 tablet under the tongue every 5 minutes as needed for Chest pain 1/8/18   Andres Miranda MD   vitamin D (CHOLECALCIFEROL) 1000 UNIT TABS tablet Take 1 tablet by mouth daily 1/8/18   Andres Miranda MD   cyclobenzaprine (FLEXERIL) 10 MG tablet Take 1 tablet by mouth 3 times daily as needed for Muscle spasms 1/8/18   Andres Miranda MD   Calcium Carbonate-Vitamin D (CALCIUM 500/D) 500-125 MG-UNIT TABS Take 1 tablet by mouth 2 times daily     Historical Provider, MD        Allergies:     Bactrim [sulfamethoxazole-trimethoprim];  Codeine; and Seasonal    Social History:     Tobacco:    reports that she quit smoking about 23 months ago. Her smoking use included cigarettes. She started smoking about 23 years ago. She has a 10.00 pack-year smoking history. She has never used smokeless tobacco.  Alcohol:      reports that she does not drink alcohol. Drug Use:  reports that she does not use drugs. Family History:     Family History   Problem Relation Age of Onset    Stroke Mother     Diabetes Mother     Heart Disease Mother     High Blood Pressure Mother     Heart Disease Father     Heart Disease Brother     High Blood Pressure Brother     Heart Disease Maternal Grandmother     High Blood Pressure Sister        Review of Systems:     Positive and Negative as described in HPI. CONSTITUTIONAL:  negative for fevers, chills, sweats, fatigue, weight loss  HEENT:  negative for vision, hearing changes, runny nose, throat pain  RESPIRATORY:  negative for shortness of breath, cough, congestion, wheezing. CARDIOVASCULAR:  negative for chest pain, palpitations.   GASTROINTESTINAL:  negative for nausea, vomiting, diarrhea, constipation, change in bowel habits, abdominal pain   GENITOURINARY:  negative for difficulty of urination, burning with urination, frequency   INTEGUMENT:  negative for rash, skin lesions, easy bruising   HEMATOLOGIC/LYMPHATIC:  negative for swelling/edema   ALLERGIC/IMMUNOLOGIC:  negative for urticaria , itching  ENDOCRINE:  negative increase in drinking, increase in urination, hot or cold intolerance  MUSCULOSKELETAL:  negative joint pains, muscle aches, swelling of joints  NEUROLOGICAL: Positive for frequent falls walks with a walker no focal neurological deficit  BEHAVIOR/PSYCH:  negative for depression, anxiety    Physical Exam:     BP (!) 125/54   Pulse 68   Temp 97.7 °F (36.5 °C) (Oral)   Resp 16   Ht 5' 4\" (1.626 m)   Wt 180 lb (81.6 kg)   SpO2 99%   BMI 30.90 kg/m²   Temp (24hrs), Av.2 °F (36.8 °C), Min:97.7 °F (36.5 °C), Max:98.8 °F (37.1 °C)    No results for input(s): POCGLU in the last 72 hours. Intake/Output Summary (Last 24 hours) at 5/30/2019 1257  Last data filed at 5/30/2019 0851  Gross per 24 hour   Intake 1000 ml   Output --   Net 1000 ml     Morbid obesity  General Appearance:  alert, well appearing, and in no acute distress  Mental status: oriented to person, place, and time with normal affect  Head:  normocephalic, atraumatic. Eye: no icterus, redness, pupils equal and reactive, extraocular eye movements intact, conjunctiva clear  Ear: normal external ear, no discharge, hearing intact  Nose:  no drainage noted  Mouth: mucous membranes moist  Neck: supple, no carotid bruits, thyroid not palpable  Lungs: Bilateral equal air entry, clear to ausculation, no wheezing, rales or rhonchi, normal effort  Cardiovascular: normal rate, regular rhythm, no murmur, gallop, rub.   Abdomen: Soft, nontender, nondistended, normal bowel sounds, no hepatomegaly or splenomegaly  Neurologic: There are no new focal motor or sensory deficits, normal muscle tone and bulk, no abnormal sensation, normal speech, cranial nerves II through XII grossly intact  Gait not checked patient walks with a walker leaning forward  Skin: No gross lesions, rashes, bruising or bleeding on exposed skin area  Extremities:  peripheral pulses palpable, no pedal edema or calf pain with palpation  Psych: normal affect    Investigations:      Laboratory Testing:  Recent Results (from the past 24 hour(s))   POCT Fecal Immunochemical Test (FIT)    Collection Time: 05/29/19  4:29 PM   Result Value Ref Range    OCCULT BLOOD FECAL negative     Control present    Troponin    Collection Time: 05/30/19  7:00 AM   Result Value Ref Range    Troponin, High Sensitivity 121 (HH) 0 - 14 ng/L    Troponin T NOT REPORTED <0.03 ng/mL    Troponin Interp NOT REPORTED    CBC Auto Differential    Collection Time: 05/30/19  7:00 AM   Result Value Ref Range    WBC 7.9 3.5 - 11.0 k/uL    RBC 3.38 (L) 4.0 - 5.2 m/uL    Hemoglobin 10.5 (L) 12.0 - Atrial Rate 71 BPM    P-R Interval 162 ms    QRS Duration 82 ms    Q-T Interval 388 ms    QTc Calculation (Bazett) 421 ms    P Axis 60 degrees    R Axis 35 degrees    T Axis 53 degrees   Urinalysis Reflex to Culture    Collection Time: 05/30/19  8:35 AM   Result Value Ref Range    Color, UA YELLOW YELLOW    Turbidity UA CLOUDY (A) CLEAR    Glucose, Ur NEGATIVE NEGATIVE    Bilirubin Urine (A) NEGATIVE     Presumptive positive. Unable to confirm due to unavailability of reagent. Ketones, Urine NEGATIVE NEGATIVE    Specific Gravity, UA 1.019 1.000 - 1.030    Urine Hgb NEGATIVE NEGATIVE    pH, UA 5.0 5.0 - 8.0    Protein, UA NEGATIVE NEGATIVE    Urobilinogen, Urine Normal Normal    Nitrite, Urine NEGATIVE NEGATIVE    Leukocyte Esterase, Urine NEGATIVE NEGATIVE    Urinalysis Comments NOT REPORTED    SODIUM, URINE, RANDOM    Collection Time: 05/30/19  8:35 AM   Result Value Ref Range    Sodium,Ur 45 mmol/L   CREATININE, RANDOM URINE    Collection Time: 05/30/19  8:35 AM   Result Value Ref Range    Creatinine, Ur 236.1 (H) 28.0 - 217.0 mg/dL   UREA NITROGEN, URINE    Collection Time: 05/30/19  8:35 AM   Result Value Ref Range    Urea Nitrogen, Ur 577 mg/dL   Microscopic Urinalysis    Collection Time: 05/30/19  8:35 AM   Result Value Ref Range    -          WBC, UA 0 TO 2 /HPF    RBC, UA 0 TO 2 /HPF    Casts UA HYALINE /LPF    Casts UA 5 TO 10 /LPF    Crystals UA NOT REPORTED None /HPF    Epithelial Cells UA 5 TO 10 /HPF    Renal Epithelial, Urine NOT REPORTED 0 /HPF    Bacteria, UA FEW (A) None    Mucus, UA NOT REPORTED None    Trichomonas, UA NOT REPORTED None    Amorphous, UA NOT REPORTED None    Other Observations UA NOT REPORTED NOT REQ.     Yeast, UA NOT REPORTED None   Troponin    Collection Time: 05/30/19  9:08 AM   Result Value Ref Range    Troponin, High Sensitivity 130 (HH) 0 - 14 ng/L    Troponin T NOT REPORTED <0.03 ng/mL    Troponin Interp NOT REPORTED        Imaging/Diagonstics:      Assessment : Primary Problem  <principal problem not specified>    Active Hospital Problems    Diagnosis Date Noted    BIN (acute kidney injury) ([facility] [address] [N17.9] 08/05/2015     Priority: High       Plan:   Acute renal failure likely prerenal  Hold Lasix is losartan no NSAIDs  Gentle hydration  1. Frequent falls MRI brain is nil acute recent MRI of the lower back had MRI done which shows a degenerative disc disease  2. Physical therapy occupational therapy to improve her lower oximetry strength and proper balance coordination  3. Type IIN STEMI secondary to renal failure denies any chest pain no further Francisco workup at this time    Consultations:   Bartolo Tang  IP CONSULT TO INTERNAL MEDICINE  IP CONSULT TO GI  IP CONSULT TO Toña Schmid MD  5/30/2019  12:57 PM    Copy sent to Dr. Samantha Dean MD    Please note that this chart was generated using voice recognition Dragon dictation software. Although every effort was made to ensure the accuracy of this automated transcription, some errors in transcription may have occurred.

## 2019-05-30 NOTE — FLOWSHEET NOTE
05/30/19 1259   Provider Notification   Reason for Communication Review case   Provider Name Dr. Latrice Ray   Provider Notification Physician   Method of Communication Face to face   Response See orders   Notification Time 1259     Informed Dr. Latrice Ray that pt has elevated trops at 121 then 130. Pt has seen Patricia Millan in the past. No orders given for this, will monitor d/t likely secondary to renal failure. Discussed pts new double vision. Consult to neurology, Dr. Ju Mazariegos. Pt had recent MRI of the brain. Pts CT head was negative for anything acute but noted food in esophagus. Ordered consult to GI. PT/OT ordered. Post void bladder scan ordered d/t tenderness in abd upon palpation. MD aware of CXR.      Electronically signed by Zelalem Gould RN on 5/30/2019 at 1:04 PM

## 2019-05-30 NOTE — PROGRESS NOTES
Pt arrived to floor from ED to room 2121. Vitals taken and telemetry applied. No distress noted. See doc flowsheet for details. Call light within reach, and pt educated on its use. Bed in lowest position, and locked. Side rails up x 2. Denied further questions or needs at this time. Will continue to monitor.

## 2019-05-30 NOTE — FLOWSHEET NOTE
05/30/19 1452   Provider Notification   Reason for Communication Review case   Provider Name Dr. Stefanie Ackerman   Provider Notification Physician   Method of Communication Face to face   Response At bedside   Notification Time      RN rounded with Dr. Stefanie Ackerman. Reviewed consult, pt case, and incidental finding in CT head. Pt ok to have clear liquid diet today, NPO at midnight, plan for EGD tomorrow. OK to have SQ heparin, just to hold AM dose. AM dose held in [facility] by RN.      Electronically signed by Edelmiro Saint, RN on 5/30/2019 at 2:57 PM

## 2019-05-30 NOTE — FLOWSHEET NOTE
05/30/19 1017   Provider Notification   Reason for Communication Review case   Provider Name Dr. Sarai Giraldo   Provider Notification Physician   Method of Communication Call   Response See orders   Notification Time [phone]     Notified of admission and need for med rec and admission orders. Pt is NPO. Per Dr. Sarai Giraldo, pt may eat but RN to check with [facility].

## 2019-05-30 NOTE — CONSULTS
cyst 1970    had oopherectomy    History of peritonitis 1968    due to ruptured appendix age 16    HTN (hypertension)     Hx of blood clots     right leg    Hyperlipidemia     Intestinal or peritoneal adhesions with obstruction (postoperative) (postinfection) ([address]     Kidney infection     renal failure/sepsis/spider bite    Lateral epicondylitis  of elbow     Muscle strain     right posterior shoulder    Other abnormal glucose     Restless legs syndrome (RLS)     Vitamin D deficiency     Wears glasses         Past Surgical History:     Past Surgical History:   Procedure Laterality Date    ABDOMEN SURGERY  1976    benign tumor removed near remaining overy, 1.5 pounds    APPENDECTOMY  1968    appendix ruptured, developed peritonitis    BUNIONECTOMY Left     along with calcium deposits removed   [address]  2005    negative    COLECTOMY  1969    12 INCHES REMOVED D/T OBSTRUCTION    COLONOSCOPY      CYST REMOVAL Right     right facial    HYSTERECTOMY  1973    taken as a result of recurring cysts    OTHER SURGICAL HISTORY  8/14/14    FESS    OVARY REMOVAL  1970    UNILATERAL due to cyst    OVARY REMOVAL  1971    partial, due to cyst    SINUS SURGERY  2004    WRIST FRACTURE SURGERY Left 3/5/2019    WRIST OPEN REDUCTION INTERNAL FIXATION performed by James Walsh MD at [id number]        Medications Prior to Admission:       Prior to Admission medications    Medication Sig Start Date End Date Taking? Authorizing Provider   cholestyramine (QUESTRAN) 4 g packet Take 1 packet by mouth 2 times daily 5/24/19   Marvis Osler, MD   SITagliptin (JANUVIA) 100 MG tablet Take 1 tablet by mouth daily 5/24/19   Marvis Osler, MD   meloxicam (MOBIC) 15 MG tablet Take 1 tablet by mouth daily 5/24/19   Marvis Osler, MD   oxyCODONE-acetaminophen (PERCOCET) 5-325 MG per tablet Take 1 tablet by mouth 2 times daily as needed for Pain for up to 30 days.  5/18/19 6/17/19 MAIK Bentley - CNP   acetaminophen (TYLENOL) 500 MG tablet Take 500 mg by mouth every 6 hours as needed for Pain    Historical Provider, MD   fenofibrate micronized (LOFIBRA) 134 MG capsule Take 1 capsule by mouth every morning (before breakfast) 3/28/19   Cherrie Thakkar MD   lisinopril (PRINIVIL;ZESTRIL) 40 MG tablet Take 1 tablet by mouth daily 3/28/19   Cherrie Thakkar MD   citalopram (CELEXA) 10 MG tablet Take 1 tablet by mouth daily 3/28/19   Cherrie Thakkar MD   spironolactone (ALDACTONE) 25 MG tablet Take 1 tablet by mouth daily 3/22/19   Cherrie Thakkar MD   carvedilol (COREG) 12.5 MG tablet Take 1 tablet by mouth 2 times daily 3/22/19   Cherrie Thakkar MD   apixaban (ELIQUIS) 5 MG TABS tablet Take 1 tablet by mouth 2 times daily 3/22/19   Katia Schwartz MD   gabapentin (NEURONTIN) 300 MG capsule Take 1 capsule by mouth nightly for 180 days. 3/22/19 9/18/19  Katia Schwartz MD   cyanocobalamin 1000 MCG/ML injection Inject 1 mL into the muscle once for 1 dose 3/18/19 5/14/19  Katia Schwartz MD   lidocaine (XYLOCAINE) 5 % ointment 4-5 times a day to your right thigh for pain.  3/15/19   Iglesia Escobar MD   furosemide (LASIX) 40 MG tablet Take 1 tablet by mouth daily 2/19/19   Cherrie Thakkar MD   famotidine (PEPCID) 20 MG tablet Take 1 tablet by mouth 2 times daily 2/8/19   Cherrie Thakkar MD   pramipexole (MIRAPEX) 1 MG tablet Take 1.5 tablets by mouth daily 2/8/19   Cherrie Thakkar MD   atorvastatin (LIPITOR) 80 MG tablet Take 1 tablet by mouth nightly 2/8/19   Cherrie Thakkar MD   hydrALAZINE (APRESOLINE) 100 MG tablet Take 1 tablet by mouth every 8 hours 1/22/19   Cherrie Thakkar MD   nitroGLYCERIN (NITROSTAT) 0.4 MG SL tablet Place 1 tablet under the tongue every 5 minutes as needed for Chest pain 1/8/18   Cherrie Thakkar MD   vitamin D (CHOLECALCIFEROL) 1000 UNIT TABS tablet Take 1 tablet by mouth daily 1/8/18   Cherrie Thakkar MD   cyclobenzaprine (FLEXERIL) 10 MG tablet Take 1 tablet by mouth 3 times daily as needed for Muscle spasms 18   Maynor Barlow MD   Calcium Carbonate-Vitamin D (CALCIUM 500/D) 500-125 MG-UNIT TABS Take 1 tablet by mouth 2 times daily     Historical Provider, MD        Allergies:       Bactrim [sulfamethoxazole-trimethoprim]; Codeine; and Seasonal    Social History:     Tobacco:    reports that she quit smoking about 23 months ago. Her smoking use included cigarettes. She started smoking about 23 years ago. She has a 10.00 pack-year smoking history. She has never used smokeless tobacco.  Alcohol:      reports that she does not drink alcohol. Drug Use:  reports that she does not use drugs.     Family History:     Family History   Problem Relation Age of Onset    Stroke Mother     Diabetes Mother     Heart Disease Mother     High Blood Pressure Mother     Heart Disease Father     Heart Disease Brother     High Blood Pressure Brother     Heart Disease Maternal Grandmother     High Blood Pressure Sister        Review of Systems:     Positive and Negative as described in HPI    Constitutional:  negative for  fevers, chills, sweats, + fatigue, and weight loss  HEENT:  negative for vision or hearing changes,   Respiratory:  shortness of breath, cough, or congestion  Cardiovascular:  negative for  chest pain, palpitations  Gastrointestinal:  negative for nausea, vomiting, diarrhea, constipation, mild abdominal pain  Genitourinary:  negative for frequency, dysuria  Integument:  negative for rash, skin lesions  Musculoskeletal:  +  muscle aches or joint pain  Neurological:  + headaches, dizziness, lightheadedness, numbness, pain and tingling extrimities  Behavior/Psych:  negative for depression and anxiety    Code Status:  Full Code    Physical Exam:     Vitals:  BP (!) 126/57   Pulse 68   Temp 97.2 °F (36.2 °C) (Oral)   Resp 18   Ht 5' 4\" (1.626 m)   Wt 180 lb (81.6 kg)   SpO2 97%   BMI 30.90 kg/m²   Temp (24hrs), Av °F (36.7 °C), Min:97.2 °F (36.2 °C), Max:98.8 °F 80 05/30/2019    CREATININE 3.54 05/30/2019    GFRAA 16 05/30/2019    LABGLOM 13 05/30/2019    GLUCOSE 127 05/30/2019    PROT 6.4 12/04/2018    LABALBU 3.8 01/21/2018    CALCIUM 8.2 05/30/2019    BILITOT 0.21 01/21/2018    ALKPHOS 32 01/21/2018    AST 22 01/21/2018    ALT 19 01/21/2018     BMP:    Lab Results   Component Value Date     05/30/2019    K 4.5 05/30/2019     05/30/2019    CO2 20 05/30/2019    BUN 80 05/30/2019    LABALBU 3.8 01/21/2018    CREATININE 3.54 05/30/2019    CALCIUM 8.2 05/30/2019    GFRAA 16 05/30/2019    LABGLOM 13 05/30/2019    GLUCOSE 127 05/30/2019     PT/INR:    Lab Results   Component Value Date    PROTIME 11.1 01/21/2018    INR 1.1 01/21/2018     PTT:    Lab Results   Component Value Date    APTT 23.8 01/21/2018   [APTT}    Assesment:     Primary Problem  <principal problem not specified>    Active Hospital Problems    Diagnosis Date Noted    BIN (acute kidney injury) ([facility] [address] [N17.9] 08/05/2015     Priority: High     Abnormal imaging  Fall   irritable bowel syndrome    Plan:     1. Aggressive hydration   2. Rx of Rhabdo  3. PPI  4. Clears  5. Plan EGD am to evaluate CT findings  The Endoscopic procedure was explained to the patient in detail  The prep and NPO were explained  All the Risks, Benefits, and Alternatives were explained  Risk of Bleeding, Perforation and Cardio Respiratory risks were explained  her questions were answered  The patient has verbalized understanding and agreement to this plan. Thank you for allowing me to participate in the care of your patient. Please feel free to contact me with any questions or concerns.      Electronically signed by Min Prado MD on 5/30/2019 at 3:37 PM     Copy sent to Dr. Cherrie Thakkar MD

## 2019-05-30 NOTE — CONSULTS
clot    History of ovarian cyst 1970    had oopherectomy    History of peritonitis 1968    due to ruptured appendix age 13    HTN (hypertension)     Hx of blood clots     right leg    Hyperlipidemia     Intestinal or peritoneal adhesions with obstruction (postoperative) (postinfection) ([address]     Kidney infection     renal failure/sepsis/spider bite    Lateral epicondylitis  of elbow     Muscle strain     right posterior shoulder    Other abnormal glucose     Restless legs syndrome (RLS)     Vitamin D deficiency     Wears glasses        Past Surgical History:        Procedure Laterality Date    ABDOMEN SURGERY  1976    benign tumor removed near remaining overy, 1.5 pounds    APPENDECTOMY  1968    appendix ruptured, developed peritonitis    BUNIONECTOMY Left     along with calcium deposits removed   [address]  2005    negative    COLECTOMY  1969    12 INCHES REMOVED D/T OBSTRUCTION    COLONOSCOPY      CYST REMOVAL Right     right facial    HYSTERECTOMY  1973    taken as a result of recurring cysts    OTHER SURGICAL HISTORY  8/14/14    FESS    OVARY REMOVAL  1970    UNILATERAL due to cyst    OVARY REMOVAL  1971    partial, due to cyst    SINUS SURGERY  2004    WRIST FRACTURE SURGERY Left 3/5/2019    WRIST OPEN REDUCTION INTERNAL FIXATION performed by Destiney Blanca MD at [address]       Current Medications:      Tetanus-Diphth-Acell Pertussis (BOOSTRIX) injection 0.5 mL Once   sodium chloride flush 0.9 % injection 10 mL 2 times per day   sodium chloride flush 0.9 % injection 10 mL PRN   magnesium hydroxide (MILK OF MAGNESIA) 400 MG/5ML suspension 30 mL Daily PRN   ondansetron (ZOFRAN) injection 4 mg Q6H PRN   0.9 % sodium chloride infusion Continuous   carvedilol (COREG) tablet 12.5 mg BID   cholestyramine light packet 4 g BID   citalopram (CELEXA) tablet 10 mg Daily   famotidine (PEPCID) tablet 20 mg BID   gabapentin (NEURONTIN) capsule 300 mg Nightly   hydrALAZINE (APRESOLINE) tablet 100 mg 3 times per day   nitroGLYCERIN (NITROSTAT) SL tablet 0.4 mg Q5 Min PRN   oxyCODONE-acetaminophen (PERCOCET) 5-325 MG per tablet 1 tablet Q8H PRN   pramipexole (MIRAPEX) tablet 1.5 mg Daily   linagliptin (TRADJENTA) tablet 5 mg Daily   heparin (porcine) injection 5,000 Units BID       Allergies:  Bactrim [sulfamethoxazole-trimethoprim];  Codeine; and Seasonal    Social History:   Social History     Socioeconomic History    Marital status:      Spouse name: Not on file    Number of children: Not on file    Years of education: Not on file    Highest education level: Not on file   Occupational History    Occupation: retired   Social Needs    Financial resource strain: Not on file    Food insecurity:     Worry: Not on file     Inability: Not on file   [employer] needs:     Medical: Not on file     Non-medical: Not on file   Tobacco Use    Smoking status: Former Smoker     Packs/day: 0.50     Years: 20.00     Pack years: 10.00     Types: Cigarettes     Start date: 1995     Last attempt to quit: 2017     Years since quittin.9    Smokeless tobacco: Never Used    Tobacco comment: 18 quit 10 days ago   Substance and Sexual Activity    Alcohol use: No     Alcohol/week: 0.0 oz     Comment: occasionally    Drug use: No    Sexual activity: Not on file   Lifestyle    Physical activity:     Days per week: Not on file     Minutes per session: Not on file    Stress: Not on file   Relationships    Social connections:     Talks on phone: Not on file     Gets together: Not on file     Attends Orthodoxy service: Not on file     Active member of club or organization: Not on file     Attends meetings of clubs or organizations: Not on file     Relationship status: Not on file    Intimate partner violence:     Fear of current or ex partner: Not on file     Emotionally abused: Not on file     Physically abused: Not on file     Forced sexual activity: Not on file   Other Topics Concern    Not on file   Social History Narrative    Not on file       Family History:   Family History   Problem Relation Age of Onset    Stroke Mother     Diabetes Mother     Heart Disease Mother     High Blood Pressure Mother     Heart Disease Father     Heart Disease Brother     High Blood Pressure Brother     Heart Disease Maternal Grandmother     High Blood Pressure Sister        Review of Systems:    Constitutional: No fever, no chills, no night sweats, fatigue, generalized weakness, loss of appetite  HEENT:  No headache, otalgia, itchy eyes, epistaxis, nasal discharge or sore throat. Cardiac:  No chest pain, dyspnea, orthopnea or PND, palpitations  Chest:  No cough, hemoptysis, pleuritic chest pain, wheezing,SOB  Abdomen:  No abdominal pain, nausea, vomiting, diarrhea, melena, dysphagia hematemesis,constipation, abdominal bloating, flank pain  Neuro:  No CVA, TIA or seizure like activity. Skin:   No rashes, no itching. :   No hematuria, no pyuria, no dysuria, no flank pain. Extremities:  No swelling or joint pains. Objective:  CURRENT TEMPERATURE:  Temp: 97.2 °F (36.2 °C)  MAXIMUM TEMPERATURE OVER 24HRS:  Temp (24hrs), Av °F (36.7 °C), Min:97.2 °F (36.2 °C), Max:98.8 °F (37.1 °C)    CURRENT RESPIRATORY RATE:  Resp: 18  CURRENT PULSE:  Pulse: 68  CURRENT BLOOD PRESSURE:  BP: (!) 126/57  24HR BLOOD PRESSURE RANGE:  Systolic (53BRK), BIP:955 , Min:101 , XIW:771   ; Diastolic (53AAD), HORACE:43, Min:39, Max:57    24HR INTAKE/OUTPUT:      Intake/Output Summary (Last 24 hours) at 2019 1539  Last data filed at 2019 0851  Gross per 24 hour   Intake 1000 ml   Output --   Net 1000 ml     Patient Vitals for the past 96 hrs (Last 3 readings):   Weight   19 0648 180 lb (81.6 kg)       Physical Exam:  GENERAL APPEARANCE: Alert and cooperative, and appears to be in no acute distress. HEAD: normocephalic  EYES: PERRL, EOMI.  Not pale, anicteric D stage III a baseline serum creatinine was 0.8 1.2 mg/dL . 3.  Fall, orthostatics negative per the nurse. Rule out cardiac causes, and neurological causes. CT head no acute intracranial abnormality. #4 type 2 diabetes  5. CHF with preserved EF and LVH diastolic dysfunction  Currently not in fluid overload, proBNP 173    Plan:  Continue IV fluids normal saline at 125 MLS per hour, watch for fluid overload. Stop lisinopril, stop NSAIDs, stop diuretics Lasix and Aldactone  Avoid hypotension hold BP meds for systolic blood pressure less than 100 mm hg.  Strict I's and O's  Hold metformin  1. Comprehensive urine testing including Urinalysis, Urine sodium, potassium, chloride, Urine protein and creatinine to quantify the proteinuria if present. Will check urinary eosinophils. 2.  Check Post void bladder scan and Renal Ultrasound to assess for urinary obstruction and to assess the kidney size, echogenicity. Thank you for the consultation.       Electronically signed by Vanessa Ballard MD on 5/30/2019 at 3:39 PM

## 2019-05-30 NOTE — PROGRESS NOTES
Pt sees pain clinic, takes percocet about once every other day for back pain. Pt has seen Dr. Jaleesa Box in the past for right leg numbness that continues at this time.  Pt has seen Dr. Dominique Parks before for hx of MI.

## 2019-05-31 ENCOUNTER — APPOINTMENT (OUTPATIENT)
Dept: MRI IMAGING | Age: 69
DRG: 682 | End: 2019-05-31
Payer: MEDICARE

## 2019-05-31 ENCOUNTER — ANESTHESIA (OUTPATIENT)
Dept: OPERATING ROOM | Age: 69
DRG: 682 | End: 2019-05-31
Payer: MEDICARE

## 2019-05-31 VITALS — DIASTOLIC BLOOD PRESSURE: 70 MMHG | SYSTOLIC BLOOD PRESSURE: 162 MMHG | OXYGEN SATURATION: 99 %

## 2019-05-31 LAB
ANION GAP SERPL CALCULATED.3IONS-SCNC: 9 MMOL/L (ref 9–17)
BUN BLDV-MCNC: 43 MG/DL (ref 8–23)
BUN/CREAT BLD: ABNORMAL (ref 9–20)
CALCIUM SERPL-MCNC: 7.8 MG/DL (ref 8.6–10.4)
CHLORIDE BLD-SCNC: 113 MMOL/L (ref 98–107)
CK MB: 14.9 NG/ML
CO2: 20 MMOL/L (ref 20–31)
CREAT SERPL-MCNC: 1.28 MG/DL (ref 0.5–0.9)
GFR AFRICAN AMERICAN: 50 ML/MIN
GFR NON-AFRICAN AMERICAN: 41 ML/MIN
GFR SERPL CREATININE-BSD FRML MDRD: ABNORMAL ML/MIN/{1.73_M2}
GFR SERPL CREATININE-BSD FRML MDRD: ABNORMAL ML/MIN/{1.73_M2}
GLUCOSE BLD-MCNC: 101 MG/DL (ref 70–99)
GLUCOSE BLD-MCNC: 76 MG/DL (ref 65–105)
HCT VFR BLD CALC: 31.8 % (ref 36–46)
HEMOGLOBIN: 10.3 G/DL (ref 12–16)
MCH RBC QN AUTO: 31.3 PG (ref 26–34)
MCHC RBC AUTO-ENTMCNC: 32.5 G/DL (ref 31–37)
MCV RBC AUTO: 96.2 FL (ref 80–100)
MYOGLOBIN: 105 NG/ML (ref 25–58)
NRBC AUTOMATED: ABNORMAL PER 100 WBC
PDW BLD-RTO: 14.3 % (ref 11.5–14.9)
PLATELET # BLD: 283 K/UL (ref 150–450)
PMV BLD AUTO: 8.1 FL (ref 6–12)
POTASSIUM SERPL-SCNC: 4.9 MMOL/L (ref 3.7–5.3)
RBC # BLD: 3.31 M/UL (ref 4–5.2)
SODIUM BLD-SCNC: 142 MMOL/L (ref 135–144)
TOTAL CK: 732 U/L (ref 26–192)
TROPONIN INTERP: ABNORMAL
TROPONIN T: ABNORMAL NG/ML
TROPONIN, HIGH SENSITIVITY: 103 NG/L (ref 0–14)
WBC # BLD: 7.1 K/UL (ref 3.5–11)

## 2019-05-31 PROCEDURE — 7100000001 HC PACU RECOVERY - ADDTL 15 MIN: Performed by: INTERNAL MEDICINE

## 2019-05-31 PROCEDURE — 83874 ASSAY OF MYOGLOBIN: CPT

## 2019-05-31 PROCEDURE — 93010 ELECTROCARDIOGRAM REPORT: CPT | Performed by: INTERNAL MEDICINE

## 2019-05-31 PROCEDURE — 86618 LYME DISEASE ANTIBODY: CPT

## 2019-05-31 PROCEDURE — 82550 ASSAY OF CK (CPK): CPT

## 2019-05-31 PROCEDURE — 72148 MRI LUMBAR SPINE W/O DYE: CPT

## 2019-05-31 PROCEDURE — 82947 ASSAY GLUCOSE BLOOD QUANT: CPT

## 2019-05-31 PROCEDURE — 0DJ08ZZ INSPECTION OF UPPER INTESTINAL TRACT, VIA NATURAL OR ARTIFICIAL OPENING ENDOSCOPIC: ICD-10-PCS | Performed by: INTERNAL MEDICINE

## 2019-05-31 PROCEDURE — 7100000000 HC PACU RECOVERY - FIRST 15 MIN: Performed by: INTERNAL MEDICINE

## 2019-05-31 PROCEDURE — 2060000000 HC ICU INTERMEDIATE R&B

## 2019-05-31 PROCEDURE — 99232 SBSQ HOSP IP/OBS MODERATE 35: CPT | Performed by: INTERNAL MEDICINE

## 2019-05-31 PROCEDURE — 6370000000 HC RX 637 (ALT 250 FOR IP): Performed by: PSYCHIATRY & NEUROLOGY

## 2019-05-31 PROCEDURE — 85027 COMPLETE CBC AUTOMATED: CPT

## 2019-05-31 PROCEDURE — 3700000000 HC ANESTHESIA ATTENDED CARE: Performed by: INTERNAL MEDICINE

## 2019-05-31 PROCEDURE — 3609017100 HC EGD: Performed by: INTERNAL MEDICINE

## 2019-05-31 PROCEDURE — 6360000002 HC RX W HCPCS: Performed by: NURSE ANESTHETIST, CERTIFIED REGISTERED

## 2019-05-31 PROCEDURE — 6370000000 HC RX 637 (ALT 250 FOR IP): Performed by: INTERNAL MEDICINE

## 2019-05-31 PROCEDURE — 36415 COLL VENOUS BLD VENIPUNCTURE: CPT

## 2019-05-31 PROCEDURE — 2580000003 HC RX 258: Performed by: SURGERY

## 2019-05-31 PROCEDURE — 43235 EGD DIAGNOSTIC BRUSH WASH: CPT | Performed by: INTERNAL MEDICINE

## 2019-05-31 PROCEDURE — 2580000003 HC RX 258: Performed by: INTERNAL MEDICINE

## 2019-05-31 PROCEDURE — 82553 CREATINE MB FRACTION: CPT

## 2019-05-31 PROCEDURE — 99232 SBSQ HOSP IP/OBS MODERATE 35: CPT | Performed by: PSYCHIATRY & NEUROLOGY

## 2019-05-31 PROCEDURE — 80048 BASIC METABOLIC PNL TOTAL CA: CPT

## 2019-05-31 PROCEDURE — 84484 ASSAY OF TROPONIN QUANT: CPT

## 2019-05-31 PROCEDURE — 2709999900 HC NON-CHARGEABLE SUPPLY: Performed by: INTERNAL MEDICINE

## 2019-05-31 RX ORDER — ALPRAZOLAM 1 MG/1
1 TABLET ORAL ONCE
Status: COMPLETED | OUTPATIENT
Start: 2019-05-31 | End: 2019-05-31

## 2019-05-31 RX ORDER — PROPOFOL 10 MG/ML
INJECTION, EMULSION INTRAVENOUS PRN
Status: DISCONTINUED | OUTPATIENT
Start: 2019-05-31 | End: 2019-05-31 | Stop reason: SDUPTHER

## 2019-05-31 RX ORDER — FAMOTIDINE 20 MG/1
20 TABLET, FILM COATED ORAL DAILY
Status: DISCONTINUED | OUTPATIENT
Start: 2019-05-31 | End: 2019-06-01

## 2019-05-31 RX ORDER — HYDRALAZINE HYDROCHLORIDE 20 MG/ML
5 INJECTION INTRAMUSCULAR; INTRAVENOUS EVERY 6 HOURS PRN
Status: DISCONTINUED | OUTPATIENT
Start: 2019-05-31 | End: 2019-06-02 | Stop reason: HOSPADM

## 2019-05-31 RX ORDER — PRAMIPEXOLE DIHYDROCHLORIDE 0.25 MG/1
0.75 TABLET ORAL NIGHTLY
Status: DISCONTINUED | OUTPATIENT
Start: 2019-05-31 | End: 2019-06-02 | Stop reason: HOSPADM

## 2019-05-31 RX ADMIN — CARVEDILOL 12.5 MG: 12.5 TABLET, FILM COATED ORAL at 09:00

## 2019-05-31 RX ADMIN — OXYCODONE HYDROCHLORIDE AND ACETAMINOPHEN 1 TABLET: 5; 325 TABLET ORAL at 09:00

## 2019-05-31 RX ADMIN — OXYCODONE HYDROCHLORIDE AND ACETAMINOPHEN 1 TABLET: 5; 325 TABLET ORAL at 00:32

## 2019-05-31 RX ADMIN — FAMOTIDINE 20 MG: 20 TABLET ORAL at 09:00

## 2019-05-31 RX ADMIN — HYDRALAZINE HYDROCHLORIDE 100 MG: 50 TABLET, FILM COATED ORAL at 05:44

## 2019-05-31 RX ADMIN — LINAGLIPTIN 5 MG: 5 TABLET, FILM COATED ORAL at 09:00

## 2019-05-31 RX ADMIN — HYDRALAZINE HYDROCHLORIDE 100 MG: 50 TABLET, FILM COATED ORAL at 17:01

## 2019-05-31 RX ADMIN — PROPOFOL 90 MG: 10 INJECTION, EMULSION INTRAVENOUS at 15:02

## 2019-05-31 RX ADMIN — PRAMIPEXOLE DIHYDROCHLORIDE 0.75 MG: 0.25 TABLET ORAL at 20:29

## 2019-05-31 RX ADMIN — CARVEDILOL 12.5 MG: 12.5 TABLET, FILM COATED ORAL at 20:28

## 2019-05-31 RX ADMIN — CHOLESTYRAMINE 4 G: 4 POWDER, FOR SUSPENSION ORAL at 09:01

## 2019-05-31 RX ADMIN — Medication 10 ML: at 00:34

## 2019-05-31 RX ADMIN — HYDRALAZINE HYDROCHLORIDE 100 MG: 50 TABLET, FILM COATED ORAL at 20:27

## 2019-05-31 RX ADMIN — GABAPENTIN 300 MG: 300 CAPSULE ORAL at 20:28

## 2019-05-31 RX ADMIN — Medication 10 ML: at 20:28

## 2019-05-31 RX ADMIN — OXYCODONE HYDROCHLORIDE AND ACETAMINOPHEN 1 TABLET: 5; 325 TABLET ORAL at 20:28

## 2019-05-31 RX ADMIN — SODIUM CHLORIDE: 9 INJECTION, SOLUTION INTRAVENOUS at 18:42

## 2019-05-31 RX ADMIN — ALPRAZOLAM 1 MG: 1 TABLET ORAL at 09:30

## 2019-05-31 RX ADMIN — CITALOPRAM HYDROBROMIDE 10 MG: 10 TABLET ORAL at 09:02

## 2019-05-31 ASSESSMENT — PAIN SCALES - GENERAL
PAINLEVEL_OUTOF10: 0
PAINLEVEL_OUTOF10: 0
PAINLEVEL_OUTOF10: 8
PAINLEVEL_OUTOF10: 6
PAINLEVEL_OUTOF10: 5
PAINLEVEL_OUTOF10: 10

## 2019-05-31 ASSESSMENT — PULMONARY FUNCTION TESTS
PIF_VALUE: 0
PIF_VALUE: 1
PIF_VALUE: 0

## 2019-05-31 NOTE — PROGRESS NOTES
XI - symmetrical shoulder shrug                                                       XII - midline tongue without atrophy or fasciculation     Motor function  Normal muscle bulk and tone; normal power 5/5, including fine motor movements     Sensory function Decreased sensation in both lower extremities below knee     Cerebellar Intact fine motor movement. No involuntary movements or tremors     Reflex function Intact 2+ DTR in upper extremities, absent in knees and ankles. Gait                  Not tested          Lab Results   Component Value Date    LDLCHOLESTEROL 42 05/20/2019     No components found for: CHLPL  Lab Results   Component Value Date    TRIG 66 05/20/2019    TRIG 62 12/27/2017    TRIG 59 07/01/2017     Lab Results   Component Value Date    HDL 48 05/20/2019    HDL 73 12/27/2017    HDL 63 07/01/2017     No results found for: LDLCALC  No results found for: LABVLDL  Lab Results   Component Value Date    LABA1C 5.7 05/20/2019     Lab Results   Component Value Date     05/20/2019     Lab Results   Component Value Date    WILKGTBH56 620 12/04/2018      Neurological work up:  CT head  CTA head and neck  MRI brain negative for acute changes  MRI lumbar spine with severe facet DJD at L4-L5 and moderate spinal canal stenosis. Severe DJD L5-S1  2 D echo       Assessment Recommendations:  Multiple falls. Likely multifactorial pathology secondary to lumbar spondylosis with severe facet DJD at L4-L5 and L5-S1   Other comorbid conditions include vitamin B12 deficiency, rhabdomyolysis     MRI lumbar spine shows severe spondylosis which could be contributing in patient's fall. Recommend continued PTOT  Outpatient neurosurgery consult for lumbar stenosis  Okay to discharge from the hospital if otherwise stable  Patient to follow up with her neurologist in the [facility] clinic as scheduled  We'll sign off. These call with questions. Thank you for the consult.               This note is created with the assistance of a speech-recognition program. While intending to generate a document that actually reflects the content of the visit, the document can still have some errors including those of syntax and sound a- like substitutions which may escape proofreading. In such instances, actual meaning can be extrapolated by contextual derivation.

## 2019-05-31 NOTE — FLOWSHEET NOTE
05/31/19 1253   Provider Notification   Reason for Communication Review case   Provider Name Dr. Claudette Rosa   Provider Notification Physician   Method of Communication Secure Message   Response No new orders   Notification Time 1253     Impression   1.  Severe facet degenerative changes at L4-L5 lead to minimal grade 1   anterolisthesis of L4 on L5.  Moderate spinal canal stenosis and mild to   moderate bilateral neural foraminal narrowing at this level.       2.  Severe bilateral facet degenerative changes at L5-S1. MRI lumbar spine was ordered by Dr. Belle Cannon last night. Dr. Claudette Rosa was notified of the above impression.  Response was, \"Patient to follow up neurology on outpatient for MRI lumbar spine findings\"    Electronically signed by Werner Osborn RN on 5/31/2019 at 12:56 PM

## 2019-05-31 NOTE — ANESTHESIA PRE PROCEDURE
Department of Anesthesiology  Preprocedure Note       Name:  Sarah Dixon   Age:  77 y.o.  :  1950                                          MRN:  [id number]         Date:  2019      Surgeon: Annelise Camarillo):  Tony Falcon MD    Procedure: EGD ESOPHAGOGASTRODUODENOSCOPY (N/A Esophagus)    Medications prior to admission:   Prior to Admission medications    Medication Sig Start Date End Date Taking? Authorizing Provider   cholestyramine (QUESTRAN) 4 g packet Take 1 packet by mouth 2 times daily 19   Melina Frank MD   SITagliptin (JANUVIA) 100 MG tablet Take 1 tablet by mouth daily 19   Melina Frank MD   meloxicam (MOBIC) 15 MG tablet Take 1 tablet by mouth daily 19   Melina Frank MD   oxyCODONE-acetaminophen (PERCOCET) 5-325 MG per tablet Take 1 tablet by mouth 2 times daily as needed for Pain for up to 30 days. 19  MAIK Hernandez - CNP   acetaminophen (TYLENOL) 500 MG tablet Take 500 mg by mouth every 6 hours as needed for Pain    Historical Provider, MD   fenofibrate micronized (LOFIBRA) 134 MG capsule Take 1 capsule by mouth every morning (before breakfast) 3/28/19   Melina Frank MD   lisinopril (PRINIVIL;ZESTRIL) 40 MG tablet Take 1 tablet by mouth daily 3/28/19   Melina Frank MD   citalopram (CELEXA) 10 MG tablet Take 1 tablet by mouth daily 3/28/19   Melina Frank MD   spironolactone (ALDACTONE) 25 MG tablet Take 1 tablet by mouth daily 3/22/19   Melina Frank MD   carvedilol (COREG) 12.5 MG tablet Take 1 tablet by mouth 2 times daily 3/22/19   Melina Frank MD   apixaban (ELIQUIS) 5 MG TABS tablet Take 1 tablet by mouth 2 times daily 3/22/19   Nilo Schwartz MD   gabapentin (NEURONTIN) 300 MG capsule Take 1 capsule by mouth nightly for 180 days.  3/22/19 9/18/19  Nilo Schwartz MD   cyanocobalamin 1000 MCG/ML injection Inject 1 mL into the muscle once for 1 dose 3/18/19 5/14/19  Nilo Schwartz MD   lidocaine (XYLOCAINE) 5 % ointment 4-5 times a day foot, left C24.011P    Facial droop R29.810    CVA (cerebral vascular accident) ([facility] I63.9    BIN (acute kidney injury) ([facility] N17.9    Bradycardia R00.1    Ataxia R27.0    Aphasia R47.01    TIA (transient ischemic attack) G45.9    Need for prophylactic vaccination and inoculation against cholera alone Z23    Chest pain R07.9    Osteopenia M85.80    Lumbago M54.5    Facet arthritis of lumbar region ([facility] M46.96    Meralgia paresthetica G57.10    Lumbar degenerative disc disease M51.36    Spondylosis of lumbar region without myelopathy or radiculopathy M47.816    Blood poisoning PAX0272    Cellulitis of left lower extremity L03. 80    Encounter for medication monitoring Z51.81    Deep vein thrombosis (DVT) of right lower extremity (HCC) I82.401    Lumbar facet arthropathy M47.816    Shortness of breath R06.02    Chronic deep vein thrombosis (DVT) of proximal vein of both lower extremities (HCC) I82.5Y3    Chronic diastolic heart failure (HCC) I50.32    Neurogenic claudication due to lumbar spinal stenosis M48.062    Sacroiliitis (HCC) M46.1    Stage 3 chronic kidney disease (HCC) N18.3    Stage 3 chronic kidney disease (HCC) N18.3    Type 2 diabetes mellitus with circulatory disorder, without long-term current use of insulin (HCC) E11.59    Chronic deep vein thrombosis (DVT) of popliteal vein of right lower extremity (HCC) I82.531    Closed fracture of left wrist S62.102A    B12 deficiency E53.8    Iron deficiency anemia secondary to inadequate dietary iron intake D50.8    Diarrhea due to malabsorption K90.9, R19.7    Closed head injury S09.90XA    Scalp laceration S01. 01XA    Abnormal finding on imaging R93.89    Other irritable bowel syndrome K58.8       Past Medical History:        Diagnosis Date    Allergic rhinitis, cause unspecified     Back pain     lumbar    Bowel obstruction (HCC)     history of due to scar tissue, resolved non-surgically    CAD (coronary artery comment: 6-20-17 quit 10 days ago   Substance Use Topics    Alcohol use: No     Alcohol/week: 0.0 oz     Comment: occasionally                                Counseling given: Not Answered  Comment: 6-20-17 quit 10 days ago      Vital Signs (Current):   Vitals:    05/30/19 0843 05/30/19 0900 05/30/19 0944 05/30/19 1418   BP:  (!) 117/52 (!) 125/54 (!) 126/57   Pulse:  72 68 68   Resp: 18 16 16 18   Temp: 98.2 °F (36.8 °C)  97.7 °F (36.5 °C) 97.2 °F (36.2 °C)   TempSrc: Oral  Oral Oral   SpO2: 95% 96% 99% 97%   Weight:       Height:                                                  BP Readings from Last 3 Encounters:   05/30/19 (!) 126/57   05/23/19 120/78   05/03/19 (!) 145/80       NPO Status:                                                                                 BMI:   Wt Readings from Last 3 Encounters:   05/30/19 180 lb (81.6 kg)   05/23/19 185 lb (83.9 kg)   05/14/19 180 lb (81.6 kg)     Body mass index is 30.9 kg/m². CBC:   Lab Results   Component Value Date    WBC 7.9 05/30/2019    RBC 3.38 05/30/2019    HGB 10.5 05/30/2019    HCT 32.3 05/30/2019    MCV 95.6 05/30/2019    RDW 14.4 05/30/2019     05/30/2019       CMP:   Lab Results   Component Value Date     05/30/2019    K 4.5 05/30/2019     05/30/2019    CO2 20 05/30/2019    BUN 80 05/30/2019    CREATININE 3.54 05/30/2019    GFRAA 16 05/30/2019    LABGLOM 13 05/30/2019    GLUCOSE 127 05/30/2019    PROT 6.4 12/04/2018    CALCIUM 8.2 05/30/2019    BILITOT 0.21 01/21/2018    ALKPHOS 32 01/21/2018    AST 22 01/21/2018    ALT 19 01/21/2018       POC Tests: No results for input(s): POCGLU, POCNA, POCK, POCCL, POCBUN, POCHEMO, POCHCT in the last 72 hours.     Coags:   Lab Results   Component Value Date    PROTIME 11.1 01/21/2018    INR 1.1 01/21/2018    APTT 23.8 01/21/2018       HCG (If Applicable): No results found for: PREGTESTUR, PREGSERUM, HCG, HCGQUANT     ABGs: No results found for: PHART, PO2ART, DGE2TGL, IPW2HBR, BEART, E0RNCCVZ     Type & Screen (If Applicable):  No results found for: LABABO, LABRH    Anesthesia Evaluation  Patient summary reviewed and Nursing notes reviewed no history of anesthetic complications:   Airway: Mallampati: III  TM distance: >3 FB   Neck ROM: limited  Mouth opening: > = 3 FB Dental: normal exam         Pulmonary:normal exam    (+) shortness of breath:                             Cardiovascular:    (+) hypertension:, past MI: no interval change, CAD:, hyperlipidemia                  Neuro/Psych:   (+) CVA:, neuromuscular disease:, TIA, psychiatric history:            GI/Hepatic/Renal:   (+) GERD:, renal disease: ARF and CRI,           Endo/Other:    (+) DiabetesType II DM, , : arthritis:., .                 Abdominal:           Vascular:   + DVT (Rt Lower extremity), . Anesthesia Plan      MAC     ASA 3       Induction: intravenous. Anesthetic plan and risks discussed with patient. Pt states she fell yesterday and sustained laceration to scalp that needed stitches.   Denies any loss of consciousness  CT Brain and C spine - normal    Liza Kinsey MD   5/30/2019

## 2019-05-31 NOTE — PROGRESS NOTES
[address]   OCCUPATIONAL THERAPY MISSED TREATMENT NOTE   INPATIENT   Date: 19  Patient Name: Nadeem Prakash       Room: [address] [place] [address]  MRN: [id number]   Account #: [de-identified]    : 1950  (77 y.o.)  Gender: female                 REASON FOR MISSED TREATMENT:  Patient at testing and/or off the floor.  EGD per MEENU Murphy, OT

## 2019-05-31 NOTE — CARE COORDINATION
CASE MANAGEMENT NOTE:    Admission Date:  5/30/2019 Shanita Rudolph is a 77 y.o.  female    Admitted for : BIN (acute kidney injury) ([facility] [N17.9]    Called Patient's spouse, Kristan Presley, and he answered all questions. PCP:  Dr. Acuña Drafts:  Medicare      Current Residence/ Living Arrangements:  at home dependent on family care from spouse. Spouse states she has fallen 7 times int he last 2 days. Current Services PTA:  No    Is patient agreeable to VNS: Yes    Freedom of choice provided: Yes    List of [address] provided: Yes    VNS chosen: [address]    DME:  [employer] and rollator    Home Oxygen: No    Nebulizer: No    CPAP/BIPAP: No    Supplier: N/A    Potential Assistance Needed: Yes    SNF needed: Yes, spouse would like patient to go to [facility]. Notified Ally Loya, of this. Pharmacy:  [facility]       Is the Patient an [employer] with Readmission Risk Score greater than 14%? Yes  If yes, pt needs a follow up appointment made within 7 days. Does Patient want to use MEDS to BEDS? No    Family Members/Caregivers that pt would like involved in their care:    Yes    If yes, list name here:  Arlene Cabrera Provider:  Family             Is patient in Isolation/One on One/Altered Mental Status? No  If yes, skip next question. If no, would they like an I-Pad to  use? No  If yes, call [phone]. Discharge Plan:  SNF- [facility] with [address] to follow from there.                  Electronically signed by: Laurie Tee RN on 5/31/2019 at 4:36 PM

## 2019-05-31 NOTE — CARE COORDINATION
[facility] Due   Topic Date Due    Diabetic microalbuminuria test  08/05/2016       Kathy Olivier RN

## 2019-05-31 NOTE — CONSULTS
[facility]    CONSULTATION / HISTORY AND PHYSICAL EXAMINATION            Date:   5/31/2019  Patient name:  Valdez Mccabe  Date of admission:  5/30/2019  6:48 AM  MRN:   [id number]  Account:  [de-identified]  YOB: 1950  PCP:    Ernestina Elliott MD  Room:   2121/2121-01  Code Status:    Full Code    Physician Requesting Consult: Ainsley Stover MD    Reason for Consult:  medmanagement    Chief Complaint:     Chief Complaint   Patient presents with   [place]    Head Laceration       History Obtained From:     pt    History of Present Illness:     Patient is a 43-year-old morbidly obese lady with a history of CVA TIA coronary artery disease with a stent with a normal renal functions in the past has been complaining of frequent falls from last few months for this typically from poor balance and walks with a walker leaning forward has seen a neurologist had MRI of the brain and lumbar spine and both done  Denies any chest pain and the nausea vomiting  No aggravating relieving factors    Past Medical History:     Past Medical History:   Diagnosis Date    Allergic rhinitis, cause unspecified     Back pain     lumbar    Bowel obstruction (HCC)     history of due to scar tissue, resolved non-surgically    CAD (coronary artery disease)     Cellulitis     left leg    Cellulitis 2017 August    leg left leg/bug bite    Diverticulosis of colon (without mention of hemorrhage)     GERD (gastroesophageal reflux disease)     History of MI (myocardial infarction) 2005    thought due to a blood clot    History of ovarian cyst 1970    had oopherectomy    History of peritonitis 1968    due to ruptured appendix age 13    HTN (hypertension)     Hx of blood clots     right leg    Hyperlipidemia     Intestinal or peritoneal adhesions with obstruction (postoperative) (postinfection) ([address]     Kidney infection     renal failure/sepsis/spider bite    Lateral epicondylitis  of elbow     Muscle strain     right posterior shoulder    Other abnormal glucose     Restless legs syndrome (RLS)     Vitamin D deficiency     Wears glasses         Past Surgical History:     Past Surgical History:   Procedure Laterality Date    ABDOMEN SURGERY  1976    benign tumor removed near remaining overy, 1.5 pounds    APPENDECTOMY  1968    appendix ruptured, developed peritonitis    BUNIONECTOMY Left     along with calcium deposits removed   [address]  2005    negative    COLECTOMY  1969    12 INCHES REMOVED D/T OBSTRUCTION    COLONOSCOPY      CYST REMOVAL Right     right facial    HYSTERECTOMY  1973    taken as a result of recurring cysts    OTHER SURGICAL HISTORY  8/14/14    FESS    OVARY REMOVAL  1970    UNILATERAL due to cyst    OVARY REMOVAL  1971    partial, due to cyst    SINUS SURGERY  2004    WRIST FRACTURE SURGERY Left 3/5/2019    WRIST OPEN REDUCTION INTERNAL FIXATION performed by Aaliyah Isaac MD at [address]        Medications Prior to Admission:     Prior to Admission medications    Medication Sig Start Date End Date Taking? Authorizing Provider   cholestyramine (QUESTRAN) 4 g packet Take 1 packet by mouth 2 times daily 5/24/19   Shant Crews MD   SITagliptin (JANUVIA) 100 MG tablet Take 1 tablet by mouth daily 5/24/19   Shant Crews MD   meloxicam (MOBIC) 15 MG tablet Take 1 tablet by mouth daily 5/24/19   Shant Crews MD   oxyCODONE-acetaminophen (PERCOCET) 5-325 MG per tablet Take 1 tablet by mouth 2 times daily as needed for Pain for up to 30 days.  5/18/19 6/17/19  MAIK Lance - CNP   acetaminophen (TYLENOL) 500 MG tablet Take 500 mg by mouth every 6 hours as needed for Pain    Historical Provider, MD   fenofibrate micronized (LOFIBRA) 134 MG capsule Take 1 capsule by mouth every morning (before breakfast) 3/28/19   Rachael Schwartz MD   lisinopril (PRINIVIL;ZESTRIL) 40 MG tablet Take 1 tablet by mouth daily 3/28/19   Yaakov Oden MD   citalopram (CELEXA) 10 MG tablet Take 1 tablet by mouth daily 3/28/19   Yaakov Oden MD   spironolactone (ALDACTONE) 25 MG tablet Take 1 tablet by mouth daily 3/22/19   Yaakov Oden MD   carvedilol (COREG) 12.5 MG tablet Take 1 tablet by mouth 2 times daily 3/22/19   Yaakov Oden MD   apixaban (ELIQUIS) 5 MG TABS tablet Take 1 tablet by mouth 2 times daily 3/22/19   Yaakov Oden MD   gabapentin (NEURONTIN) 300 MG capsule Take 1 capsule by mouth nightly for 180 days. 3/22/19 9/18/19  Jose Alfredo Schwartz MD   cyanocobalamin 1000 MCG/ML injection Inject 1 mL into the muscle once for 1 dose 3/18/19 5/14/19  Jose Alfredo Schwartz MD   lidocaine (XYLOCAINE) 5 % ointment 4-5 times a day to your right thigh for pain. 3/15/19   Jenn Graff MD   furosemide (LASIX) 40 MG tablet Take 1 tablet by mouth daily 2/19/19   Yaakov Oden MD   famotidine (PEPCID) 20 MG tablet Take 1 tablet by mouth 2 times daily 2/8/19   Yaakov Oden MD   pramipexole (MIRAPEX) 1 MG tablet Take 1.5 tablets by mouth daily 2/8/19   Yaakov Oden MD   atorvastatin (LIPITOR) 80 MG tablet Take 1 tablet by mouth nightly 2/8/19   Yaakov Oden MD   hydrALAZINE (APRESOLINE) 100 MG tablet Take 1 tablet by mouth every 8 hours 1/22/19   Yaakov Oden MD   nitroGLYCERIN (NITROSTAT) 0.4 MG SL tablet Place 1 tablet under the tongue every 5 minutes as needed for Chest pain 1/8/18   Yaakov Oden MD   vitamin D (CHOLECALCIFEROL) 1000 UNIT TABS tablet Take 1 tablet by mouth daily 1/8/18   Yaakov Oden MD   cyclobenzaprine (FLEXERIL) 10 MG tablet Take 1 tablet by mouth 3 times daily as needed for Muscle spasms 1/8/18   Yaakov Oden MD   Calcium Carbonate-Vitamin D (CALCIUM 500/D) 500-125 MG-UNIT TABS Take 1 tablet by mouth 2 times daily     Historical Provider, MD        Allergies:     Bactrim [sulfamethoxazole-trimethoprim];  Codeine; and Seasonal    Social History:     Tobacco:    reports that she quit smoking about 1 years ago. Her smoking use included cigarettes. She started smoking about 23 years ago. She has a 10.00 pack-year smoking history. She has never used smokeless tobacco.  Alcohol:      reports that she does not drink alcohol. Drug Use:  reports that she does not use drugs. Family History:     Family History   Problem Relation Age of Onset    Stroke Mother     Diabetes Mother     Heart Disease Mother     High Blood Pressure Mother     Heart Disease Father     Heart Disease Brother     High Blood Pressure Brother     Heart Disease Maternal Grandmother     High Blood Pressure Sister        Review of Systems:     Positive and Negative as described in HPI. CONSTITUTIONAL:  negative for fevers, chills, sweats, fatigue, weight loss  HEENT:  negative for vision, hearing changes, runny nose, throat pain  RESPIRATORY:  negative for shortness of breath, cough, congestion, wheezing. CARDIOVASCULAR:  negative for chest pain, palpitations.   GASTROINTESTINAL:  negative for nausea, vomiting, diarrhea, constipation, change in bowel habits, abdominal pain   GENITOURINARY:  negative for difficulty of urination, burning with urination, frequency   INTEGUMENT:  negative for rash, skin lesions, easy bruising   HEMATOLOGIC/LYMPHATIC:  negative for swelling/edema   ALLERGIC/IMMUNOLOGIC:  negative for urticaria , itching  ENDOCRINE:  negative increase in drinking, increase in urination, hot or cold intolerance  MUSCULOSKELETAL:  negative joint pains, muscle aches, swelling of joints  NEUROLOGICAL: Positive for frequent falls walks with a walker no focal neurological deficit  BEHAVIOR/PSYCH:  negative for depression, anxiety    Physical Exam:     BP (!) 165/80   Pulse 83   Temp 97.5 °F (36.4 °C) (Oral)   Resp 19   Ht 5' 4\" (1.626 m)   Wt 180 lb (81.6 kg)   SpO2 97%   BMI 30.90 kg/m²   Temp (24hrs), Av.9 °F (36.6 °C), Min:97.2 °F (36.2 °C), Max:98.8 °F (37.1 °C)    No results for input(s): POCGLU in the last 72 hours. Intake/Output Summary (Last 24 hours) at 5/31/2019 1036  Last data filed at 5/31/2019 0850  Gross per 24 hour   Intake 2227.64 ml   Output 2200 ml   Net 27.64 ml     Morbid obesity  General Appearance:  alert, well appearing, and in no acute distress  Mental status: oriented to person, place, and time with normal affect  Head:  normocephalic, atraumatic. Eye: no icterus, redness, pupils equal and reactive, extraocular eye movements intact, conjunctiva clear  Ear: normal external ear, no discharge, hearing intact  Nose:  no drainage noted  Mouth: mucous membranes moist  Neck: supple, no carotid bruits, thyroid not palpable  Lungs: Bilateral equal air entry, clear to ausculation, no wheezing, rales or rhonchi, normal effort  Cardiovascular: normal rate, regular rhythm, no murmur, gallop, rub.   Abdomen: Soft, nontender, nondistended, normal bowel sounds, no hepatomegaly or splenomegaly  Neurologic: There are no new focal motor or sensory deficits, normal muscle tone and bulk, no abnormal sensation, normal speech, cranial nerves II through XII grossly intact  Gait not checked patient walks with a walker leaning forward  Skin: No gross lesions, rashes, bruising or bleeding on exposed skin area  Extremities:  peripheral pulses palpable, no pedal edema or calf pain with palpation  Psych: normal affect    Investigations:      Laboratory Testing:  Recent Results (from the past 24 hour(s))   CBC    Collection Time: 05/31/19  4:51 AM   Result Value Ref Range    WBC 7.1 3.5 - 11.0 k/uL    RBC 3.31 (L) 4.0 - 5.2 m/uL    Hemoglobin 10.3 (L) 12.0 - 16.0 g/dL    Hematocrit 31.8 (L) 36 - 46 %    MCV 96.2 80 - 100 fL    MCH 31.3 26 - 34 pg    MCHC 32.5 31 - 37 g/dL    RDW 14.3 11.5 - 14.9 %    Platelets 088 380 - 253 k/uL    MPV 8.1 6.0 - 12.0 fL    NRBC Automated NOT REPORTED per 100 WBC   BASIC METABOLIC PANEL    Collection Time: 05/31/19  4:51 AM   Result Value Ref Range    Glucose 101 (H) 70 - 99 mg/dL BUN 43 (H) 8 - 23 mg/dL    CREATININE 1.28 (H) 0.50 - 0.90 mg/dL    Bun/Cre Ratio NOT REPORTED 9 - 20    Calcium 7.8 (L) 8.6 - 10.4 mg/dL    Sodium 142 135 - 144 mmol/L    Potassium 4.9 3.7 - 5.3 mmol/L    Chloride 113 (H) 98 - 107 mmol/L    CO2 20 20 - 31 mmol/L    Anion Gap 9 9 - 17 mmol/L    GFR Non-African American 41 (L) >60 mL/min    GFR African American 50 (L) >60 mL/min    GFR Comment          GFR Staging NOT REPORTED    Troponin    Collection Time: 05/31/19  4:51 AM   Result Value Ref Range    Troponin, High Sensitivity 103 (HH) 0 - 14 ng/L    Troponin T NOT REPORTED <0.03 ng/mL    Troponin Interp NOT REPORTED        Imaging/Diagonstics:      Assessment :      Primary Problem  <principal problem not specified>    Active Hospital Problems    Diagnosis Date Noted    BIN (acute kidney injury) ([facility] [address] [N17.9] 08/05/2015     Priority: High    Closed head injury [S09.90XA]     Scalp laceration [S01.01XA]     Abnormal finding on imaging [R93.89]     Other irritable bowel syndrome [K58.8]     Frequent falls [R29.6]     Double vision [H53.2]     Muscle soreness [M79.10]        Plan:   Acute renal failure likely prerenal  Hold Lasix is losartan no NSAIDs  Gentle hydration  1. Frequent falls MRI brain is nil acute recent MRI of the lower back had MRI done which shows a degenerative disc disease  2. Physical therapy occupational therapy to improve her lower oximetry strength and proper balance coordination  3. Type IIN STEMI secondary to renal failure denies any chest pain no further cardiac workup at this time  4. May 31   mri lumbar plannoted  May need ecf for reha  Fall risk      Consultations:   IP CONSULT TO NEPHROLOGY  IP CONSULT TO TRAUMA SURGERY  IP CONSULT TO INTERNAL MEDICINE  IP CONSULT TO GI  IP CONSULT TO Tre Lambert MD  5/31/2019  10:36 AM    Copy sent to Dr. Will Vital MD    Please note that this chart was generated using voice recognition Dragon dictation software. Although every effort was made to ensure the accuracy of this automated transcription, some errors in transcription may have occurred.

## 2019-05-31 NOTE — PROGRESS NOTES
 History of MI (myocardial infarction) 2005    thought due to a blood clot    History of ovarian cyst 1970    had oopherectomy    History of peritonitis 1968    due to ruptured appendix age 13    HTN (hypertension)     Hx of blood clots     right leg    Hyperlipidemia     Intestinal or peritoneal adhesions with obstruction (postoperative) (postinfection) ([address]     Kidney infection     renal failure/sepsis/spider bite    Lateral epicondylitis  of elbow     Muscle strain     right posterior shoulder    Other abnormal glucose     Restless legs syndrome (RLS)     Vitamin D deficiency     Wears glasses        Past Surgical History:        Procedure Laterality Date    ABDOMEN SURGERY  1976    benign tumor removed near remaining overy, 1.5 pounds    APPENDECTOMY  1968    appendix ruptured, developed peritonitis    BUNIONECTOMY Left     along with calcium deposits removed   [address]  2005    negative    COLECTOMY  1969    12 INCHES REMOVED D/T OBSTRUCTION    COLONOSCOPY      CYST REMOVAL Right     right facial    HYSTERECTOMY  1973    taken as a result of recurring cysts    OTHER SURGICAL HISTORY  8/14/14    FESS    OVARY REMOVAL  1970    UNILATERAL due to cyst    OVARY REMOVAL  1971    partial, due to cyst    SINUS SURGERY  2004    WRIST FRACTURE SURGERY Left 3/5/2019    WRIST OPEN REDUCTION INTERNAL FIXATION performed by Della Abdalla MD at [address]       Current Medications:      famotidine (PEPCID) tablet 20 mg Daily   Tetanus-Diphth-Acell Pertussis (BOOSTRIX) injection 0.5 mL Once   sodium chloride flush 0.9 % injection 10 mL 2 times per day   sodium chloride flush 0.9 % injection 10 mL PRN   magnesium hydroxide (MILK OF MAGNESIA) 400 MG/5ML suspension 30 mL Daily PRN   ondansetron (ZOFRAN) injection 4 mg Q6H PRN   0.9 % sodium chloride infusion Continuous   carvedilol (COREG) tablet 12.5 mg BID   cholestyramine light packet 4 g BID citalopram (CELEXA) tablet 10 mg Daily   gabapentin (NEURONTIN) capsule 300 mg Nightly   hydrALAZINE (APRESOLINE) tablet 100 mg 3 times per day   nitroGLYCERIN (NITROSTAT) SL tablet 0.4 mg Q5 Min PRN   oxyCODONE-acetaminophen (PERCOCET) 5-325 MG per tablet 1 tablet Q8H PRN   pramipexole (MIRAPEX) tablet 1.5 mg Daily   linagliptin (TRADJENTA) tablet 5 mg Daily   heparin (porcine) injection 5,000 Units BID       Allergies:  Bactrim [sulfamethoxazole-trimethoprim]; Codeine; and Seasonal      Objective:  CURRENT TEMPERATURE:  Temp: 97.5 °F (36.4 °C)  MAXIMUM TEMPERATURE OVER 24HRS:  Temp (24hrs), Av.9 °F (36.6 °C), Min:97.2 °F (36.2 °C), Max:98.8 °F (37.1 °C)    CURRENT RESPIRATORY RATE:  Resp: 19  CURRENT PULSE:  Pulse: 83  CURRENT BLOOD PRESSURE:  BP: (!) 165/80  24HR BLOOD PRESSURE RANGE:  Systolic (89OFE), KXZ:097 , Min:116 , OFK:922   ; Diastolic (99NRJ), LOF:74, Min:57, Max:93    24HR INTAKE/OUTPUT:      Intake/Output Summary (Last 24 hours) at 2019 1252  Last data filed at 2019 0850  Gross per 24 hour   Intake 2227.64 ml   Output 2200 ml   Net 27.64 ml     Patient Vitals for the past 96 hrs (Last 3 readings):   Weight   19 0648 180 lb (81.6 kg)       Physical Exam:  GENERAL APPEARANCE: Alert and cooperative, and appears to be in no acute distress. HEAD: normocephalic  EYES: PERRL, EOMI. Not pale, anicteric   NOSE:  No nasal discharge. THROAT:  Oral cavity and pharynx normal. Moist  CARDIAC: Normal S1 and S2. No S3, S4 or murmurs. Rhythm is regular. LUNGS: Clear to auscultation and percussion without rales, rhonchi, wheezing or diminished breath sounds. NECK: Neck supple, non-tender without lymphadenopathy, masses or thyromegaly. JVD-neg  BACK: Examination of the spine reveals normal gait and posture, no spinal deformity, symmetry of spinal muscles, without tenderness, decreased range of motion or muscular spasm  MUSKULOSKELETAL: Adequately aligned spine.  No joint erythema or tenderness. EXTREMITIES: Trace edema. Peripheral pulses intact. NEURO: Nonfocal      Labs:   CBC:  Recent Labs     05/30/19  0700 05/31/19  0451   WBC 7.9 7.1   RBC 3.38* 3.31*   HGB 10.5* 10.3*   HCT 32.3* 31.8*   MCV 95.6 96.2   MCH 31.2 31.3   MCHC 32.6 32.5   RDW 14.4 14.3    283   MPV 7.6 8.1      BMP:   Recent Labs     05/30/19  0700 05/31/19  0451    142   K 4.5 4.9    113*   CO2 20 20   BUN 80* 43*   CREATININE 3.54* 1.28*   GLUCOSE 127* 101*   CALCIUM 8.2* 7.8*        Urine Sodium:    Recent Labs     05/30/19  0835   RAISA 45      Urine Creatinine:    Recent Labs     05/30/19  0835   LABCREA 236.1*     Urine Eosinophils: Invalid input(s): EOSU  Urine Protein:  No results for input(s): TPU in the last 72 hours. Urinalysis:    Recent Labs     05/30/19  0835   NITRU NEGATIVE   COLORU YELLOW   PHUR 5.0   WBCUA 0 TO 2   RBCUA 0 TO 2   MUCUS NOT REPORTED   TRICHOMONAS NOT REPORTED   YEAST NOT REPORTED   BACTERIA FEW*   SPECGRAV 1.019   LEUKOCYTESUR NEGATIVE   UROBILINOGEN Normal   BILIRUBINUR Presumptive positive. Unable to confirm due to unavailability of reagent. *   GLUCOSEU NEGATIVE   [address] NEGATIVE   AMORPHOUS NOT REPORTED         Radiology:  Reviewed as available. Assessment:  1. BIN on CKD, oliguric, likely causes includes prerenal azotemia vs atn due to hypovolemia, use of diuretics, NSAIDs, ACE inhibitor's        CK 1203        UA unremarkable no protein no blood        Renal ultrasound unremarkable no hydronephrosis  2. CK D stage III a baseline serum creatinine was 0.8 1.2 mg/dL . 3.  Fall, orthostatics negative per the nurse. Rule out cardiac causes, and neurological causes. CT head no acute intracranial abnormality. #4 type 2 diabetes  5. CHF with preserved EF and LVH diastolic dysfunction  Currently not in fluid overload, proBNP 173    Plan:  dec IV fluids normal saline at 75 MLS per hour, watch for fluid overload.   Stop lisinopril, stop NSAIDs, stop diuretics Lasix and Aldactone  Avoid hypotension hold BP meds for systolic blood pressure less than 100 mm hg.  Strict I's and O's  Hold metformin          Thank you for the consultation.       Electronically signed by Erinn Pathak MD  5/31/2019 at 12:52 PM

## 2019-05-31 NOTE — PLAN OF CARE
Problem: Falls - Risk of:  Goal: Will remain free from falls  Description  Will remain free from falls  5/31/2019 0404 by Mary Jo Olivera RN  Outcome: Met This Shift  5/30/2019 1735 by Cody Peters RN  Outcome: Ongoing  Note:   Pt assessed as a fall risk this shift. Remains free from falls and accidental injury at this time. Fall precautions in place, including falling star sign and fall risk band on pt. Floor free from obstacles, and bed is locked and in lowest position. Adequate lighting provided. Pt encouraged to call before getting OOB for any need. Bed alarm activated. Will continue to monitor needs during hourly rounding, and reinforce education on use of call light. Goal: Absence of physical injury  Description  Absence of physical injury  Outcome: Met This Shift     Problem: Tissue Perfusion - Renal, Altered:  Goal: Serum creatinine will be within specified parameters  Description  Serum creatinine will be within specified parameters  5/31/2019 0404 by Mary Jo Olivera RN  Outcome: Met This Shift  5/30/2019 1735 by Cody Peters RN  Outcome: Ongoing  Note:   Pt admitted with creat 3.54. Currently pt has NS running at 125.  Will continue to monitor  Goal: Ability to achieve a balanced intake and output will improve  Description  Ability to achieve a balanced intake and output will improve  Outcome: Met This Shift

## 2019-05-31 NOTE — PROGRESS NOTES
Physical Therapy    Facility/Department: [facility] OR  Initial Assessment    NAME: Maral More DATE: 2019    NAME: Maral More  MRN: [id number]   : 1950    Patient not seen this date for Physical Therapy due to:  [] Blood transfusion in progress  [] Hemodialysis  []  Patient Declined  [] Spine Precautions   [] Strict Bedrest  [x] Surgery/ Procedure Pt leaving upon arrival for PT eval to go to EGD, Will follow .  [] Testing      [] Other        [] PT being discontinued at this time. Patient independent. No further needs. [] PT being discontinued at this time as the patient has been transferred to palliative care. No further needs. Yvonne Capone, PT    : 1950  MRN: [id number]    Date of Service: 2019    Discharge Recommendations:           Assessment              Patient Diagnosis(es): The primary encounter diagnosis was Closed head injury, initial encounter. Diagnoses of Laceration of scalp, initial encounter and BIN (acute kidney injury) ([facility] [address] were also pertinent to this visit. has a past medical history of Allergic rhinitis, cause unspecified, Back pain, Bowel obstruction (HCC), CAD (coronary artery disease), Cellulitis, Cellulitis, Diverticulosis of colon (without mention of hemorrhage), GERD (gastroesophageal reflux disease), History of MI (myocardial infarction), History of ovarian cyst, History of peritonitis, HTN (hypertension), Hx of blood clots, Hyperlipidemia, Intestinal or peritoneal adhesions with obstruction (postoperative) (postinfection) ([address] Kidney infection, Lateral epicondylitis  of elbow, Muscle strain, Other abnormal glucose, Restless legs syndrome (RLS), Vitamin D deficiency, and Wears glasses. has a past surgical history that includes Cardiac catheterization; Ovary removal (); colectomy (); Appendectomy (); Ovary removal (); Hysterectomy (); Abdomen surgery (); Cardiac catheterization ();  Bunionectomy (Left); sinus surgery (); Colonoscopy; other surgical history (8/14/14); cyst removal (Right); and Wrist fracture surgery (Left, 3/5/2019).     Restrictions     Vision/Hearing        Subjective             Orientation     Social/Functional History     Cognition        Objective                                           Plan        G-Code       OutComes Score                                                  AM-PAC Score             Goals          Therapy Time   Individual Concurrent Group Co-treatment   Time In           Time Out           Minutes                   Hattie Guevara, PT

## 2019-05-31 NOTE — PLAN OF CARE
Problem: Falls - Risk of:  Goal: Will remain free from falls  Description  Will remain free from falls  Outcome: Ongoing  Note:   The patient remained free from falls this shift, call light within reach, bed in locked and lowest position. Side rails up x2. Continue to monitor closely. Problem: Tissue Perfusion - Renal, Altered:  Goal: Serum creatinine will be within specified parameters  Description  Serum creatinine will be within specified parameters  Outcome: Ongoing  Note:   Pts creatinine has increased     Problem: Pain:  Goal: Pain level will decrease  Description  Pain level will decrease  Outcome: Ongoing  Note:   Pt medicated with pain medication prn. Assessed all pain characteristics including level, type, location, frequency, and onset. Non-pharmacologic interventions offered to pt as well. Pt states pain is tolerable at this time.  Will continue to monitor

## 2019-05-31 NOTE — CARE COORDINATION
SW received info that this patient 's  wants her to go to [facility]. SW did fax the referral to that facility late on this date. SW following.

## 2019-05-31 NOTE — FLOWSHEET NOTE
Patient anxious as she waits on medical testing and findings; patient explained \"they can't find what is wrong with me\"; reviewed patient's HCPOA with patient and put copy in patient's chart; patient's HCPOA is her , Lorraine Segura ([phone]) and her daughter Dain Moulton ([phone]); listening presence and support provided;     05/31/19 1239   Encounter Summary   Services provided to: Patient   Referral/Consult From: [address] [place] [address]; Children   Continue Visiting   (5/31/19)   Complexity of Encounter Moderate   Length of Encounter 15 minutes   Spiritual Assessment Completed Yes   Routine   Type Follow up   Grief and Life Adjustment   Type Adjustment to illness   Assessment Approachable; Anxious; Hopeful;Coping;Helplessness   Intervention Active listening;Nurtured hope;Prayer;Sustaining presence/ Ministry of presence; Discussed illness/injury and it's impact   Outcome Comfort;Expressed gratitude;Engaged in conversation;Expressed feelings/needs/concerns;Coping; Hopeful;Receptive   Advance Directives (For Healthcare)   Healthcare Directive Yes, patient has an advance directive for healthcare treatment   Type of Healthcare Directive Durable power of  for health care   Copy in Chart Yes, copy in chart   Date Reviewed and Current: 05/31/19   [address] power of    Healthcare Agent's Name Lorraine Segura -    Healthcare Agent's Phone Number [phone]

## 2019-05-31 NOTE — PROGRESS NOTES
General Surgery Progress Note  POD# 0    Subjective:     Patient is stable she underwent upper endoscopy no evidence of any ulceration noted she was found to have diffuse esophageal spasm     Scheduled Meds:   famotidine  20 mg Oral Daily    pramipexole  0.75 mg Oral Nightly    Tetanus-Diphth-Acell Pertussis  0.5 mL Intramuscular Once    sodium chloride flush  10 mL Intravenous 2 times per day    carvedilol  12.5 mg Oral BID    cholestyramine light  1 packet Oral BID    citalopram  10 mg Oral Daily    gabapentin  300 mg Oral Nightly    hydrALAZINE  100 mg Oral 3 times per day    linagliptin  5 mg Oral Daily    heparin (porcine)  5,000 Units Subcutaneous BID     Continuous Infusions:   sodium chloride 50 mL/hr at 05/31/19 1217     PRN Meds:sodium chloride flush, magnesium hydroxide, ondansetron, nitroGLYCERIN, oxyCODONE-acetaminophen    Objective:   BP (!) 175/66   Pulse 75   Temp 97.2 °F (36.2 °C) (Axillary)   Resp 18   Ht 5' 4\" (1.626 m)   Wt 180 lb (81.6 kg)   SpO2 100%   BMI 30.90 kg/m²     I/O last 3 completed shifts:   In: 2227.6 [I.V.:2227.6]  Out: 2200 [Urine:2200]    Recent Results (from the past 24 hour(s))   CBC    Collection Time: 05/31/19  4:51 AM   Result Value Ref Range    WBC 7.1 3.5 - 11.0 k/uL    RBC 3.31 (L) 4.0 - 5.2 m/uL    Hemoglobin 10.3 (L) 12.0 - 16.0 g/dL    Hematocrit 31.8 (L) 36 - 46 %    MCV 96.2 80 - 100 fL    MCH 31.3 26 - 34 pg    MCHC 32.5 31 - 37 g/dL    RDW 14.3 11.5 - 14.9 %    Platelets 414 173 - 424 k/uL    MPV 8.1 6.0 - 12.0 fL    NRBC Automated NOT REPORTED per 100 WBC   BASIC METABOLIC PANEL    Collection Time: 05/31/19  4:51 AM   Result Value Ref Range    Glucose 101 (H) 70 - 99 mg/dL    BUN 43 (H) 8 - 23 mg/dL    CREATININE 1.28 (H) 0.50 - 0.90 mg/dL    Bun/Cre Ratio NOT REPORTED 9 - 20    Calcium 7.8 (L) 8.6 - 10.4 mg/dL    Sodium 142 135 - 144 mmol/L    Potassium 4.9 3.7 - 5.3 mmol/L    Chloride 113 (H) 98 - 107 mmol/L    CO2 20 20 - 31 mmol/L    Anion Gap 9 9 - 17 mmol/L    GFR Non-African American 41 (L) >60 mL/min    GFR African American 50 (L) >60 mL/min    GFR Comment          GFR Staging NOT REPORTED    Troponin    Collection Time: 05/31/19  4:51 AM   Result Value Ref Range    Troponin, High Sensitivity 103 (HH) 0 - 14 ng/L    Troponin T NOT REPORTED <0.03 ng/mL    Troponin Interp NOT REPORTED    CK-MB    Collection Time: 05/31/19  4:51 AM   Result Value Ref Range    CK-MB 14.9 (H) <5.4 ng/mL   CK    Collection Time: 05/31/19  4:51 AM   Result Value Ref Range    Total  (H) 26 - 192 U/L   Myoglobin    Collection Time: 05/31/19  4:51 AM   Result Value Ref Range    Myoglobin 105 (H) 25 - 58 ng/mL   POC Glucose Fingerstick    Collection Time: 05/31/19  2:30 PM   Result Value Ref Range    POC Glucose 76 65 - 105 mg/dL     , Examination is negative for a tenderness no rebound bowel sounds of present scalp laceration wound appears to be healing nicely      Assessment/Plan:   1.  [facility] in am tomorrow    Electronically signed by Anastasiya Toscano MD  on 5/31/2019 at 6:17 PM

## 2019-05-31 NOTE — FLOWSHEET NOTE
05/31/19 1909   Provider Notification   Reason for Communication Review case   Provider Name Dr. Tasha Thurman   Provider Notification Physician   Method of Communication Call   Response See orders   Notification Time 1909     Notified of BP.  Orders received for hydralazine 5mg IV Q6 PRN for SBP >150

## 2019-05-31 NOTE — OP NOTE
PROCEDURE NOTE    DATE OF PROCEDURE: 5/31/2019     SURGEON: Curtis Camejo MD    ASSISTANT: None    PREOPERATIVE DIAGNOSIS: ABNORMAL IMAGING      POSTOPERATIVE DIAGNOSIS: As described below    OPERATION: Upper GI endoscopy with Biopsy    ANESTHESIA: Moderate Sedation     ESTIMATED BLOOD LOSS: Less than 50 ml    COMPLICATIONS: None. SPECIMENS:  Was Not Obtained    HISTORY: The patient is a 77y.o. year old female with history of above preop diagnosis. I recommended esophagogastroduodenoscopy with possible biopsy and I explained the risk, benefits, expected outcome, and alternatives to the procedure. Risks included but are not limited to bleeding, infection, respiratory distress, hypotension, and perforation of the esophagus, stomach, or duodenum. Patient understands and is in agreement. PROCEDURE: The patient was given IV conscious sedation. The patient's SPO2 remained above 90% throughout the procedure. The gastroscope was inserted orally and advanced under direct vision through the esophagus, through the stomach, through the pylorus, and into the descending duodenum. Findings:    Retropharyngeal area was grossly normal appearing    Esophagus: abnormal: EXTENSIVE SPASMS OF THE ESOPHAGUS  NO LESION  NO ULCERS    Stomach:    Fundus: normal    Body: normal    Antrum: normal    Duodenum:     Descending: normal    Bulb: normal    The scope was removed and the patient tolerated the procedure well. Recommendations/Plan:   1. F/U Biopsies  2. F/U In Office in 3-4 weeks  3. Discussed with the family  4.  Post sedation patient was stable with stable vital signs and stable O2 saturations    Electronically signed by Curtis Camejo MD  on 5/31/2019 at 3:08 PM

## 2019-05-31 NOTE — ANESTHESIA POSTPROCEDURE EVALUATION
POST- ANESTHESIA EVALUATION       Pt Name: Natalie Kang  MRN: [id number]  YOB: 1950  Date of evaluation: 5/31/2019  Time:  3:29 PM      BP (!) 165/66   Pulse 74   Temp 97.9 °F (36.6 °C) (Infrared)   Resp 16   Ht 5' 4\" (1.626 m)   Wt 180 lb (81.6 kg)   SpO2 98%   BMI 30.90 kg/m²      Consciousness Level  Awake  Cardiopulmonary Status  Stable  Pain Adequately Treated YES  Nausea / Vomiting  NO  Adequate Hydration  YES  Anesthesia Related Complications NONE      Electronically signed by Yogi Norman MD on 5/31/2019 at 3:29 PM       Department of Anesthesiology  Postprocedure Note    Patient: Natalie Kang  MRN: [id number]  YOB: 1950  Date of evaluation: 5/31/2019  Time:  3:29 PM     Procedure Summary     Date:  05/31/19 Room / Location:  [address] [place] [address] 01 / [address]    Anesthesia Start:  7220 Anesthesia Stop:  6072    Procedure:  EGD ESOPHAGOGASTRODUODENOSCOPY (N/A Esophagus) Diagnosis:  (DYSPHAGIA)    Surgeon:  Camron Parham MD Responsible Provider:  Arlin Lyles MD    Anesthesia Type:  MAC ASA Status:  3          Anesthesia Type: MAC    Elver Phase I: Elver Score: 8    Elver Phase II:      Last vitals: Reviewed and per EMR flowsheets.        Anesthesia Post Evaluation

## 2019-05-31 NOTE — CONSULTS
[facility]            [facility], [address]; [place], [address]    [address] [facility], [address], [place], [address] [facility]            Dept: [phone]          Dept Fax: [phone]             MD Johnson Guardado MD Ahmed B. Dorothea Cooter, MD Timmie Gartner, MD                    [facility], [address] [facility], [address] [place] [address] NOTE    PATIENT NAME: Eloy Chavis   PATIENT MRN: [id number]   PRIMARY CARE PHYSICIAN: Yanely George MD  CONSULT REQUESTED BY: Inpatient consult to Nephrology  Consult performed by: Sukhdeep Beatty MD  Consult ordered by: Steve Calvo DO        DATE OF SERVICE: 5/30/2019    CHIEF COMPLAINT: Fall and Head Laceration       HPI: Eloy Chavis is a     Brenden Matter a 77year old RH WF,who was admitted after fall. She was at home, around 5:30AM, she woke up due to thirsty, tried to get get water, she walked around the bed, she fell without warning signs (sweating, chest pain, dizziness), she fell between chair and her bed, hit her head on nightstand, she felt water coming down her face, actually it was blood. She laied there for 30 minutes, she called but  was far in family room. She was brought to ED where [facility] and CT cervical showed NAF but found esophagus dilated, she will have EGD tomorrow. She got 14 staples on left side of head for the laceration. She has had multiple falls since last year, in 3/2019, she fell with left forearm injury s/p surgery, few weeks after, she fell again, hurt her left hand ring finger. She does have back pain, follows with pain management. She denies leg pain or give out. No issues with urination, she recently had diarrhea, then recently switched metformin to other medications. No tremors, no stiffness.  She did have blurry vision for 3-4 days before this admission, sometime saw double (horizontal) in both eyes. She had high troponin 121, CKMB 44.8; CK 1203; UA few bacteria. NEUROLOGICAL  MRI brain 4/2019  No acute intracranial abnormality.       Minimal parenchymal volume loss.       Minimal chronic microvascular disease.       Sinus mucosal disease and minimal right mastoid effusion     MRI lumbar 7/2018    1. No areas of critical spinal canal or neural foraminal narrowing. 2. Multilevel degenerative disc disease most significant at L4-L5 where there   is moderate bilateral neural foraminal narrowing related to facet and   ligamentum flavum hypertrophy with circumferential disc bulge.      PAST MEDICAL HISTORY:         Diagnosis Date    Allergic rhinitis, cause unspecified     Back pain     lumbar    Bowel obstruction (HCC)     history of due to scar tissue, resolved non-surgically    CAD (coronary artery disease)     Cellulitis     left leg    Cellulitis 2017 August    leg left leg/bug bite    Diverticulosis of colon (without mention of hemorrhage)     GERD (gastroesophageal reflux disease)     History of MI (myocardial infarction) 2005    thought due to a blood clot    History of ovarian cyst 1970    had oopherectomy    History of peritonitis 1968    due to ruptured appendix age 13    HTN (hypertension)     Hx of blood clots     right leg    Hyperlipidemia     Intestinal or peritoneal adhesions with obstruction (postoperative) (postinfection) ([address]     Kidney infection     renal failure/sepsis/spider bite    Lateral epicondylitis  of elbow     Muscle strain     right posterior shoulder    Other abnormal glucose     Restless legs syndrome (RLS)     Vitamin D deficiency     Wears glasses         PAST SURGICAL HISTORY:         Procedure Laterality Date    ABDOMEN SURGERY  1976    benign tumor removed near remaining overy, 1.5 pounds    APPENDECTOMY  1968    appendix ruptured, developed peritonitis    BUNIONECTOMY Left     along with calcium deposits removed   [address]  2005    negative    COLECTOMY  1969    12 INCHES REMOVED D/T OBSTRUCTION    COLONOSCOPY      CYST REMOVAL Right     right facial    HYSTERECTOMY  1973    taken as a result of recurring cysts    OTHER SURGICAL HISTORY  15    FESS    OVARY REMOVAL  1970    UNILATERAL due to cyst    OVARY REMOVAL  1971    partial, due to cyst    SINUS SURGERY  2004    WRIST FRACTURE SURGERY Left 3/5/2019    WRIST OPEN REDUCTION INTERNAL FIXATION performed by Anjali Hdz MD at [address] [place] [address] Ave:     Social History     Socioeconomic History    Marital status:      Spouse name: Not on file    Number of children: Not on file    Years of education: Not on file    Highest education level: Not on file   Occupational History    Occupation: retired   Social Needs    Financial resource strain: Not on file    Food insecurity:     Worry: Not on file     Inability: Not on file   [employer] needs:     Medical: Not on file     Non-medical: Not on file   Tobacco Use    Smoking status: Former Smoker     Packs/day: 0.50     Years: 20.00     Pack years: 10.00     Types: Cigarettes     Start date: 1995     Last attempt to quit: 2017     Years since quittin.9    Smokeless tobacco: Never Used    Tobacco comment: 18 quit 10 days ago   Substance and Sexual Activity    Alcohol use: No     Alcohol/week: 0.0 oz     Comment: occasionally    Drug use: No    Sexual activity: Not on file   Lifestyle    Physical activity:     Days per week: Not on file     Minutes per session: Not on file    Stress: Not on file   Relationships    Social connections:     Talks on phone: Not on file     Gets together: Not on file     Attends Buddhist service: Not on file     Active member of club or organization: Not on file     Attends meetings of clubs or organizations: Not on file     Relationship status: Not on file    Intimate partner violence:     Fear of current or ex partner: Not on file     Emotionally abused: Not on file     Physically abused: Not on file     Forced sexual activity: Not on file   Other Topics Concern    Not on file   Social History Narrative    Not on file       MEDICATIONS:     Current Facility-Administered Medications   Medication Dose Route Frequency Provider Last Rate Last Dose    Tetanus-Diphth-Acell Pertussis (BOOSTRIX) injection 0.5 mL  0.5 mL Intramuscular Once Steph Moran MD        sodium chloride flush 0.9 % injection 10 mL  10 mL Intravenous 2 times per day Steph Moran MD   10 mL at 05/30/19 1233    sodium chloride flush 0.9 % injection 10 mL  10 mL Intravenous PRN Steph Moran MD        magnesium hydroxide (MILK OF MAGNESIA) 400 MG/5ML suspension 30 mL  30 mL Oral Daily PRN Steph Moran MD        ondansetron (ZOFRAN) injection 4 mg  4 mg Intravenous Q6H PRN Steph Moran MD        0.9 % sodium chloride infusion   Intravenous Continuous Steph Moran  mL/hr at 05/30/19 1234      carvedilol (COREG) tablet 12.5 mg  12.5 mg Oral BID Anabell Monroy MD   12.5 mg at 05/30/19 1231    cholestyramine light packet 4 g  1 packet Oral BID Anabell Monroy MD   4 g at 05/30/19 1232    citalopram (CELEXA) tablet 10 mg  10 mg Oral Daily Anabell Monroy MD   10 mg at 05/30/19 1232    famotidine (PEPCID) tablet 20 mg  20 mg Oral BID Anabell Monroy MD   20 mg at 05/30/19 1231    gabapentin (NEURONTIN) capsule 300 mg  300 mg Oral Nightly Jm Lisandra Elias MD        hydrALAZINE (APRESOLINE) tablet 100 mg  100 mg Oral 3 times per day Anabell Monroy MD   100 mg at 05/30/19 1618    nitroGLYCERIN (NITROSTAT) SL tablet 0.4 mg  0.4 mg Sublingual Q5 Min PRN Anabell Monroy MD        oxyCODONE-acetaminophen (PERCOCET) 5-325 MG per tablet 1 tablet  1 tablet Oral Q8H PRN Anabell Monroy MD   1 tablet at 05/30/19 1617    pramipexole (MIRAPEX) tablet 1.5 mg  1.5 mg Oral Daily Jm Lisandra Elias MD        linagliptin (TRADJENTA) tablet 5 mg  5 mg Oral Daily Jm Talon Robbins MD   5 mg at 05/30/19 1231    heparin (porcine) injection 5,000 Units  5,000 Units Subcutaneous BID Jaylen Perez MD   Stopped at 05/31/19 0900        ALLERGIES:     Allergies   Allergen Reactions    Bactrim [Sulfamethoxazole-Trimethoprim] Other (See Comments)     sepsis    Codeine Itching    Seasonal        REVIEW OF SYSTEMS:        CONSTITUTIONAL Weight change: absent, Appetite change: absent, Fatigue: absent    HEENT Ears: normal, Visual disturbance: absent    RESPIRATORY Shortness of breath: absent, Cough: absent    CARDIOVASCULAR Chest pain: absent, Leg swelling :absent    GI Constipation: absent, Diarrhea: absent, Swallowing change: absent     Urinary frequency: absent, Urinary urgency: absent, Urinary incontinence: absent    MUSCULOSKELETAL Neck pain: absent, Back pain: present, Stiffness: absent, Muscle pain: absent, Joint pain: absent Restless legs: absent    DERMATOLOGIC Hair loss: absent, Skin changes: absent    NEUROLOGIC Memory loss: absent, Confusion: absent, Seizures: absent Trouble walking or imbalance: present, Dizziness: absent, Weakness: absent, Numbness: absent Tremor: absent, Spasm: absent, Speech difficulty: absent, Headache: absent, Light sensitivity: absent    PSYCHIATRIC Anxiety: absent, Hallucination: absent, Mood disorder: absent    HEMATOLOGIC Abnormal bleeding: absent, Anemia: absent, Clotting disorder: absent, Lymph gland changes: absent     VITALS  BP (!) 126/57   Pulse 68   Temp 97.2 °F (36.2 °C) (Oral)   Resp 18   Ht 5' 4\" (1.626 m)   Wt 180 lb (81.6 kg)   SpO2 97%   BMI 30.90 kg/m²      PHYSICAL EXAMINATION:     General appearance: cooperative  Skin: left side head staples. HEENT: normocephalic  Optic Fundi: deferred  Neck: supple, no cervcical adenopathy or carotid bruit  Lungs: clear to auscultation  Heart: Regular rate and rhythm, normal S1, S2.  No murmurs, clicks or gallops. Peripheral pulses: radial pulses palpable  Abdominal: BS present, soft, NT, ND  Extremities: edema on left ring finger    NEUROLOGICAL EXAMINATION:     MS: awake, alert and oriented. No aphasia, dysarthria, or neglect  CNs: PERRLA, EOMI, VF full, sensation intact, face symmetric, hearing intact, soft palate rises on phonation, sternocleidomastoid and trapezius intact. Tongue midline, no fasciculations. Motor: no abnormal movements, tone and bulk okay. RUE: delta 5/5, biceps 5/5, triceps 5/5,  5/5  LUE: delta 5/5, biceps 5/5, triceps 5/5,  5/5  RLE: hf 4+/5, ke 4+/5, df 4+/5, pf 4+/5  LLE: hf 4+/5, ke 4+/5, df 4+/5, pf 4+/5  Reflexes: 1+ throughout, symmetric, babinski not present. Coordination: FNF no dysmetria, heel to shin okay, RAMBO okay, Rhomberg deferred  Gait: deferred, Tandem deferred  Sensory: stock distribution reduction to temp/pp below knees, no extinction.     LABS & TESTS  Lab Results   Component Value Date    WBC 7.9 05/30/2019    HGB 10.5 (L) 05/30/2019    HCT 32.3 (L) 05/30/2019    MCV 95.6 05/30/2019     05/30/2019     ASSESSMENT/PLANS:     // Frequent falls  - unclear etiology, this time fell at home with head injury s/p staples  - pt denies predromal, likely multifactorial, leg weakness, back pain  - had MRI brain last month: only volume loss, had EMG/NCV on LE at outside facility, report normal, no report available  - Lumbar MRI in 7/2018, will repeat  - on B12 monthly injection  -- PT, OT   -  fall precaution     // Muscle soreness  - CK high 1203  - pt complained before, ordered on 5/3, but pt never got it done until this admission  - sufficient hydration, she used to be on statin    // double vision   - new onset, started 5 days ago  - pt did not check single or both eyes, denies swallowing problem, sob, denies weakness worse with time goes by  - unclear causes, pt does have risk for strokes, no family history of MG     // Vitamin B12 deficiency   - continue follow with PCP for B12 supplement     // Peripheral polyneuropathy  - could be due to DM, B12 and lumbar pathology   - continue optimal BG control    // BIN, Troponinemia  -per cardiology, nephrology and primary team       Thank you for giving us the opportunity to participate in Ms. Shaw Ricardo care.  Will follow    Lucy Raymundo MD  [facility], Neurology

## 2019-06-01 LAB
ANION GAP SERPL CALCULATED.3IONS-SCNC: 9 MMOL/L (ref 9–17)
BUN BLDV-MCNC: 26 MG/DL (ref 8–23)
BUN/CREAT BLD: ABNORMAL (ref 9–20)
CALCIUM IONIZED: 1.22 MMOL/L (ref 1.13–1.33)
CALCIUM SERPL-MCNC: 7.9 MG/DL (ref 8.6–10.4)
CHLORIDE BLD-SCNC: 113 MMOL/L (ref 98–107)
CO2: 21 MMOL/L (ref 20–31)
CREAT SERPL-MCNC: 1.03 MG/DL (ref 0.5–0.9)
EKG ATRIAL RATE: 71 BPM
EKG P AXIS: 60 DEGREES
EKG P-R INTERVAL: 162 MS
EKG Q-T INTERVAL: 388 MS
EKG QRS DURATION: 82 MS
EKG QTC CALCULATION (BAZETT): 421 MS
EKG R AXIS: 35 DEGREES
EKG T AXIS: 53 DEGREES
EKG VENTRICULAR RATE: 71 BPM
GFR AFRICAN AMERICAN: >60 ML/MIN
GFR NON-AFRICAN AMERICAN: 53 ML/MIN
GFR SERPL CREATININE-BSD FRML MDRD: ABNORMAL ML/MIN/{1.73_M2}
GFR SERPL CREATININE-BSD FRML MDRD: ABNORMAL ML/MIN/{1.73_M2}
GLUCOSE BLD-MCNC: 111 MG/DL (ref 70–99)
HCT VFR BLD CALC: 31.1 % (ref 36–46)
HEMOGLOBIN: 10.1 G/DL (ref 12–16)
MCH RBC QN AUTO: 31 PG (ref 26–34)
MCHC RBC AUTO-ENTMCNC: 32.3 G/DL (ref 31–37)
MCV RBC AUTO: 95.9 FL (ref 80–100)
NRBC AUTOMATED: ABNORMAL PER 100 WBC
PDW BLD-RTO: 14.3 % (ref 11.5–14.9)
PLATELET # BLD: 287 K/UL (ref 150–450)
PMV BLD AUTO: 8 FL (ref 6–12)
POTASSIUM SERPL-SCNC: 4.9 MMOL/L (ref 3.7–5.3)
RBC # BLD: 3.25 M/UL (ref 4–5.2)
SODIUM BLD-SCNC: 143 MMOL/L (ref 135–144)
TOTAL CK: 387 U/L (ref 26–192)
WBC # BLD: 4.6 K/UL (ref 3.5–11)

## 2019-06-01 PROCEDURE — 6370000000 HC RX 637 (ALT 250 FOR IP): Performed by: INTERNAL MEDICINE

## 2019-06-01 PROCEDURE — 99232 SBSQ HOSP IP/OBS MODERATE 35: CPT | Performed by: INTERNAL MEDICINE

## 2019-06-01 PROCEDURE — 6370000000 HC RX 637 (ALT 250 FOR IP): Performed by: PSYCHIATRY & NEUROLOGY

## 2019-06-01 PROCEDURE — 2060000000 HC ICU INTERMEDIATE R&B

## 2019-06-01 PROCEDURE — 36415 COLL VENOUS BLD VENIPUNCTURE: CPT

## 2019-06-01 PROCEDURE — 85027 COMPLETE CBC AUTOMATED: CPT

## 2019-06-01 PROCEDURE — 97166 OT EVAL MOD COMPLEX 45 MIN: CPT

## 2019-06-01 PROCEDURE — 97530 THERAPEUTIC ACTIVITIES: CPT

## 2019-06-01 PROCEDURE — 6360000002 HC RX W HCPCS: Performed by: INTERNAL MEDICINE

## 2019-06-01 PROCEDURE — 82330 ASSAY OF CALCIUM: CPT

## 2019-06-01 PROCEDURE — 82550 ASSAY OF CK (CPK): CPT

## 2019-06-01 PROCEDURE — APPNB30 APP NON BILLABLE TIME 0-30 MINS: Performed by: NURSE PRACTITIONER

## 2019-06-01 PROCEDURE — 2580000003 HC RX 258: Performed by: INTERNAL MEDICINE

## 2019-06-01 PROCEDURE — 97161 PT EVAL LOW COMPLEX 20 MIN: CPT

## 2019-06-01 PROCEDURE — 80048 BASIC METABOLIC PNL TOTAL CA: CPT

## 2019-06-01 RX ORDER — AMLODIPINE BESYLATE 5 MG/1
5 TABLET ORAL DAILY
Status: DISCONTINUED | OUTPATIENT
Start: 2019-06-02 | End: 2019-06-01

## 2019-06-01 RX ORDER — FAMOTIDINE 20 MG/1
20 TABLET, FILM COATED ORAL 2 TIMES DAILY
Status: DISCONTINUED | OUTPATIENT
Start: 2019-06-01 | End: 2019-06-02 | Stop reason: HOSPADM

## 2019-06-01 RX ORDER — AMLODIPINE BESYLATE 5 MG/1
5 TABLET ORAL DAILY
Status: DISCONTINUED | OUTPATIENT
Start: 2019-06-01 | End: 2019-06-02 | Stop reason: HOSPADM

## 2019-06-01 RX ADMIN — HYDRALAZINE HYDROCHLORIDE 100 MG: 50 TABLET, FILM COATED ORAL at 06:54

## 2019-06-01 RX ADMIN — PRAMIPEXOLE DIHYDROCHLORIDE 0.75 MG: 0.25 TABLET ORAL at 19:39

## 2019-06-01 RX ADMIN — GABAPENTIN 300 MG: 300 CAPSULE ORAL at 19:40

## 2019-06-01 RX ADMIN — HYDRALAZINE HYDROCHLORIDE 100 MG: 50 TABLET, FILM COATED ORAL at 22:26

## 2019-06-01 RX ADMIN — CARVEDILOL 12.5 MG: 12.5 TABLET, FILM COATED ORAL at 09:06

## 2019-06-01 RX ADMIN — CHOLESTYRAMINE 4 G: 4 POWDER, FOR SUSPENSION ORAL at 09:06

## 2019-06-01 RX ADMIN — CITALOPRAM HYDROBROMIDE 10 MG: 10 TABLET ORAL at 10:32

## 2019-06-01 RX ADMIN — AMLODIPINE BESYLATE 5 MG: 5 TABLET ORAL at 17:29

## 2019-06-01 RX ADMIN — CARVEDILOL 12.5 MG: 12.5 TABLET, FILM COATED ORAL at 19:40

## 2019-06-01 RX ADMIN — HEPARIN SODIUM 5000 UNITS: 5000 INJECTION, SOLUTION INTRAVENOUS; SUBCUTANEOUS at 19:42

## 2019-06-01 RX ADMIN — CHOLESTYRAMINE 4 G: 4 POWDER, FOR SUSPENSION ORAL at 19:41

## 2019-06-01 RX ADMIN — OXYCODONE HYDROCHLORIDE AND ACETAMINOPHEN 1 TABLET: 5; 325 TABLET ORAL at 08:01

## 2019-06-01 RX ADMIN — HYDRALAZINE HYDROCHLORIDE 5 MG: 20 INJECTION INTRAMUSCULAR; INTRAVENOUS at 11:13

## 2019-06-01 RX ADMIN — LINAGLIPTIN 5 MG: 5 TABLET, FILM COATED ORAL at 09:07

## 2019-06-01 RX ADMIN — HYDRALAZINE HYDROCHLORIDE 100 MG: 50 TABLET, FILM COATED ORAL at 14:15

## 2019-06-01 RX ADMIN — Medication 10 ML: at 19:40

## 2019-06-01 RX ADMIN — FAMOTIDINE 20 MG: 20 TABLET ORAL at 19:40

## 2019-06-01 RX ADMIN — MAGNESIUM HYDROXIDE 30 ML: 400 SUSPENSION ORAL at 12:31

## 2019-06-01 RX ADMIN — OXYCODONE HYDROCHLORIDE AND ACETAMINOPHEN 1 TABLET: 5; 325 TABLET ORAL at 19:39

## 2019-06-01 RX ADMIN — HEPARIN SODIUM 5000 UNITS: 5000 INJECTION, SOLUTION INTRAVENOUS; SUBCUTANEOUS at 09:07

## 2019-06-01 RX ADMIN — FAMOTIDINE 20 MG: 20 TABLET ORAL at 09:06

## 2019-06-01 ASSESSMENT — PAIN DESCRIPTION - PAIN TYPE
TYPE: CHRONIC PAIN

## 2019-06-01 ASSESSMENT — PAIN SCALES - GENERAL
PAINLEVEL_OUTOF10: 5
PAINLEVEL_OUTOF10: 5
PAINLEVEL_OUTOF10: 0
PAINLEVEL_OUTOF10: 0
PAINLEVEL_OUTOF10: 4
PAINLEVEL_OUTOF10: 5
PAINLEVEL_OUTOF10: 2
PAINLEVEL_OUTOF10: 4

## 2019-06-01 ASSESSMENT — PAIN DESCRIPTION - LOCATION
LOCATION: BACK

## 2019-06-01 NOTE — FLOWSHEET NOTE
06/01/19 1057   Provider Notification   Reason for Communication Review case   Provider Name Dr. Kamar Rene   Provider Notification Physician   Method of Communication Face to face   Response See orders   Notification Time [phone]     RN rounded with Dr. Kamar Rene. Notified of low calcium today and yesterday. Ordered ionized ca. Discussed hypertension r/t the following medications being held by nephro: lisinopril, aldactone, and lasix. Will continue to monitor BP and discuss plans for these meds with nephro later. Pt BP is stable at this time with scheduled hydralazine and PRN IV hydralazine. Discussed that pt was taking eliquis at home for treatment of a blood clot she had in her leg about a year ago. Pt is currently on SQ heparin. Will most likely not resume eliquis on discharge as pt has completed therapy and is a high fall risk.      Electronically signed by Sanjay Brown RN on 6/1/2019 at 11:03 AM

## 2019-06-01 NOTE — FLOWSHEET NOTE
06/01/19 1604   Provider Notification   Reason for Communication Review case   Provider Name Dr. Edilberto Adams   Provider Notification Physician   Method of Communication Call   Response See orders     Notified  despite PO hydralazine and pt not due yet for PRN.  Will start norvasc 5mg PO now versus in the AM.     Electronically signed by Tamiko Lucas RN on 6/1/2019 at 4:05 PM

## 2019-06-01 NOTE — FLOWSHEET NOTE
06/01/19 1127   Provider Notification   Reason for Communication Review case   Provider Name Dr. Henry Douglas   Provider Notification Physician   Method of Communication Face to face   Response See orders   Notification Time [id number]     RN rounded with Dr. Henry Douglas. Fluids at 50ml/hr. Ordered CPK add on for today and a CPK for tomorrow. If CPK is less than 300 today, RN is to d/c fluids and run [facility]. RN and MD addressed hypertension and that pt was on lisinopril, lasix, and aldactone at home. Dr. Henry Douglas informed RN that he will most likely not resume lisinopril on discharge and she will follow up in the office regarding that medication. For now he will start amlodipine daily. Will start tomorrow as pt just got a dose of PRN hydralazine, then has scheduled hydralazine due at 1400. Depending on CPK and BMP tomorrow, will consider restarting a small dose of lasix tomorrow when he rounds. Electronically signed by Tommy Smith RN on 6/1/2019 at 11:34 AM       ADDED: Dr. Henry Douglas is still at the nurses station. He was notified that CK came back at 387. Dr. Henry Douglas said to stop the IVF after this bag is done infusing.  Electronically signed by Tommy Smith RN on 6/1/2019 at 12:05 PM

## 2019-06-01 NOTE — CARE COORDINATION
Social work: pt states [facility] is no longer needed pt her PT session today. Pt is open to home care/therapy if needed. She had been in therapy on her left hand in the past.  And is open to the home care if she qualifies.   Karsten garcia

## 2019-06-01 NOTE — PROGRESS NOTES
(Left); sinus surgery (2004); Colonoscopy; other surgical history (8/14/14); cyst removal (Right); Wrist fracture surgery (Left, 3/5/2019); and Upper gastrointestinal endoscopy (N/A, 5/31/2019). Vision/Hearing  Vision: Impaired  Vision Exceptions: Wears glasses at all times  Hearing: Within functional limits     Subjective  Pain Screening  Patient Currently in Pain: Yes  Pain Assessment  Pain Assessment: 0-10  Pain Level: 5  Pain Type: Chronic pain  Pain Location: Back  Vital Signs  Patient Currently in Pain: Yes     Orientation  Orientation  Overall Orientation Status: Within Normal Limits  Social/Functional History  Social/Functional History  Lives With: Spouse  Type of Home: House  Home Layout: One level, Laundry in basement(patient states  carries laundry up and down steps. Patient goes into basement to do laundry)  Home Access: Stairs to enter without rails  Entrance Stairs - Number of Steps: 1  Bathroom Shower/Tub: Tub/Shower unit, Doors  Bathroom Toilet: Standard  Bathroom Equipment: Grab bars in [address] [place] [address] [place]: Cane, Rolling walker, 4 wheeled walker  Receives Help From: Family  ADL Assistance: [address]: [address] [place] [address] Responsibilities: Yes  Ambulation Assistance: Independent  Transfer Assistance: Independent  Active : Yes  Mode of Transportation: [facility]  Occupation: Retired    Objective    AROM RLE (degrees)  RLE AROM: WFL  AROM LLE (degrees)  LLE AROM : WFL  AROM RUE (degrees)  RUE AROM : WFL  AROM LUE (degrees)  LUE AROM : WFL     Bed mobility  Supine to Sit: Independent  Sit to Supine: Independent  Scooting: Independent     Transfers  Sit to Stand: Independent  Stand to sit:  Independent  Lateral Transfers: Independent     Ambulation  Ambulation?: Yes  More Ambulation?: Yes  Ambulation 1  Surface: level tile  Device: No Device  Assistance: Contact guard assistance/SBA  Quality of Gait: Patient demonstrated steady balance when walking in the hallway and looking straight ahead. Patient demonstrated slight LOB when looking down with path deviation but was able to self correct. Patient needed to take one standing rest break on return from long walk  Distance: 82ft x2     Ambulation 2  Surface - 2: level tile  Device 2: No device  Distance: 22ft x4  Comments: SBA ambulation while patient completing head turn up, down L, R   LOB noted with looking down    Stairs/Curb  Stairs?: Yes  Stairs  # Steps : 3(3 steps x2)  Device: No Device  Assistance: Contact guard assistance     Balance  Sitting - Static: Good(In sitting with finger tracking patient demonstrates rapid eye beats when looking R)  Sitting - Dynamic: Good  Standing - Static: Good  Standing - Dynamic: Fair;+  Tandem Stance R Le  Tandem Stance L Leg: 15  Comments: single leg stance lifts leg momentarily, Patient demonstrates postural swaying when standing and turning head.      Exercises  Comments: 3 way hip x10     Plan   Plan  Times per week: 5  Times per day: Daily  Current Treatment Recommendations: Strengthening, Balance Training  Safety Devices  Type of devices: Gait belt, Call light within reach, Left in chair(Patient left with OT)    Goals  Short term goals  Time Frame for Short term goals: 7 visits  Short term goal 1: ambulation 150ft SBA  Short term goal 2: Tandem balance 30 sec  Short term goal 3: Dynamic balance GOOD     Therapy Time   Individual Concurrent Group Co-treatment   Time In 1309         Time Out 1355         Minutes [address] [facility],

## 2019-06-01 NOTE — PLAN OF CARE
Problem: Falls - Risk of:  Goal: Will remain free from falls  Description  Will remain free from falls  6/1/2019 9686 by José Miguel Gomez RN  Outcome: Met This Shift  5/31/2019 1939 by Kelby Walker RN  Outcome: Ongoing  Note:   The patient remained free from falls this shift, call light within reach, bed in locked and lowest position. Side rails up x2. Continue to monitor closely. Goal: Absence of physical injury  Description  Absence of physical injury  Outcome: Met This Shift     Problem: Tissue Perfusion - Renal, Altered:  Goal: Serum creatinine will be within specified parameters  Description  Serum creatinine will be within specified parameters  6/1/2019 0633 by José Miguel Gomez RN  Outcome: Met This Shift  5/31/2019 1939 by Kelby Walker RN  Outcome: Ongoing  Note:   Pts creatinine has increased  Goal: Ability to achieve a balanced intake and output will improve  Description  Ability to achieve a balanced intake and output will improve  Outcome: Met This Shift     Problem: Pain:  Goal: Pain level will decrease  Description  Pain level will decrease  6/1/2019 0633 by José Miguel Gomez RN  Outcome: Met This Shift  5/31/2019 1939 by Kelby Walker RN  Outcome: Ongoing  Note:   Pt medicated with pain medication prn. Assessed all pain characteristics including level, type, location, frequency, and onset. Non-pharmacologic interventions offered to pt as well. Pt states pain is tolerable at this time.  Will continue to monitor    Goal: Control of acute pain  Description  Control of acute pain  Outcome: Met This Shift  Goal: Control of chronic pain  Description  Control of chronic pain  Outcome: Met This Shift

## 2019-06-01 NOTE — CONSULTS
[facility]    CONSULTATION / HISTORY AND PHYSICAL EXAMINATION            Date:   6/1/2019  Patient name:  Phil Borges  Date of admission:  5/30/2019  6:48 AM  MRN:   [id number]  Account:  [de-identified]  YOB: 1950  PCP:    Nelda Joyner MD  Room:   2121/2121-01  Code Status:    Full Code    Physician Requesting Consult: Mayco Casper MD    Reason for Consult:  medmanagement    Chief Complaint:     Chief Complaint   Patient presents with   Andre Service    Head Laceration       History Obtained From:     pt    History of Present Illness:     Patient is a 60-year-old morbidly obese lady with a history of CVA TIA coronary artery disease with a stent with a normal renal functions in the past has been complaining of frequent falls from last few months for this typically from poor balance and walks with a walker leaning forward has seen a neurologist had MRI of the brain and lumbar spine and both done  Denies any chest pain and the nausea vomiting  No aggravating relieving factors    Past Medical History:     Past Medical History:   Diagnosis Date    Allergic rhinitis, cause unspecified     Back pain     lumbar    Bowel obstruction (HCC)     history of due to scar tissue, resolved non-surgically    CAD (coronary artery disease)     Cellulitis     left leg    Cellulitis 2017 August    leg left leg/bug bite    Diverticulosis of colon (without mention of hemorrhage)     GERD (gastroesophageal reflux disease)     History of MI (myocardial infarction) 2005    thought due to a blood clot    History of ovarian cyst 1970    had oopherectomy    History of peritonitis 1968    due to ruptured appendix age 13    HTN (hypertension)     Hx of blood clots     right leg    Hyperlipidemia     Intestinal or peritoneal adhesions with obstruction (postoperative) (postinfection) ([address]     Kidney infection     renal failure/sepsis/spider bite    Lateral epicondylitis  of elbow     Muscle strain     right posterior shoulder    Other abnormal glucose     Restless legs syndrome (RLS)     Vitamin D deficiency     Wears glasses         Past Surgical History:     Past Surgical History:   Procedure Laterality Date    ABDOMEN SURGERY  1976    benign tumor removed near remaining overy, 1.5 pounds    APPENDECTOMY  1968    appendix ruptured, developed peritonitis    BUNIONECTOMY Left     along with calcium deposits removed   [address]  2005    negative    COLECTOMY  1969    12 INCHES REMOVED D/T OBSTRUCTION    COLONOSCOPY      CYST REMOVAL Right     right facial    HYSTERECTOMY  1973    taken as a result of recurring cysts    OTHER SURGICAL HISTORY  8/14/14    FESS    OVARY REMOVAL  1970    UNILATERAL due to cyst    OVARY REMOVAL  1971    partial, due to cyst    SINUS SURGERY  2004    UPPER GASTROINTESTINAL ENDOSCOPY N/A 5/31/2019    EGD ESOPHAGOGASTRODUODENOSCOPY performed by Kassandra Dior MD at [address] Left 3/5/2019    WRIST OPEN REDUCTION INTERNAL FIXATION performed by Bradly Gomes MD at [address]        Medications Prior to Admission:     Prior to Admission medications    Medication Sig Start Date End Date Taking? Authorizing Provider   cholestyramine (QUESTRAN) 4 g packet Take 1 packet by mouth 2 times daily 5/24/19   Hans Torres MD   SITagliptin (JANUVIA) 100 MG tablet Take 1 tablet by mouth daily 5/24/19   Hans Torres MD   meloxicam (MOBIC) 15 MG tablet Take 1 tablet by mouth daily 5/24/19   Hans Torres MD   oxyCODONE-acetaminophen (PERCOCET) 5-325 MG per tablet Take 1 tablet by mouth 2 times daily as needed for Pain for up to 30 days.  5/18/19 6/17/19  MAIK Contreras - CNP   acetaminophen (TYLENOL) 500 MG tablet Take 500 mg by mouth every 6 hours as needed for Pain    Historical Provider, MD   fenofibrate micronized (LOFIBRA) 134 MG capsule Take 1 capsule by mouth every morning (before breakfast) 3/28/19   Mikhail Thomas MD   lisinopril (PRINIVIL;ZESTRIL) 40 MG tablet Take 1 tablet by mouth daily 3/28/19   Mikhail Thomas MD   citalopram (CELEXA) 10 MG tablet Take 1 tablet by mouth daily 3/28/19   Mikhail Thomas MD   spironolactone (ALDACTONE) 25 MG tablet Take 1 tablet by mouth daily 3/22/19   Mikhail Thomas MD   carvedilol (COREG) 12.5 MG tablet Take 1 tablet by mouth 2 times daily 3/22/19   Mikhail Thomas MD   apixaban (ELIQUIS) 5 MG TABS tablet Take 1 tablet by mouth 2 times daily 3/22/19   Mikhail Thomas MD   gabapentin (NEURONTIN) 300 MG capsule Take 1 capsule by mouth nightly for 180 days. 3/22/19 9/18/19  Gifty Schwartz MD   cyanocobalamin 1000 MCG/ML injection Inject 1 mL into the muscle once for 1 dose 3/18/19 5/14/19  Gifty Schwartz MD   lidocaine (XYLOCAINE) 5 % ointment 4-5 times a day to your right thigh for pain.  3/15/19   Arun Childers MD   furosemide (LASIX) 40 MG tablet Take 1 tablet by mouth daily 2/19/19   Mikhail Thomas MD   famotidine (PEPCID) 20 MG tablet Take 1 tablet by mouth 2 times daily 2/8/19   Mikhail Thomas MD   pramipexole (MIRAPEX) 1 MG tablet Take 1.5 tablets by mouth daily 2/8/19   Mikhail Thomas MD   atorvastatin (LIPITOR) 80 MG tablet Take 1 tablet by mouth nightly 2/8/19   Mikhail Thomas MD   hydrALAZINE (APRESOLINE) 100 MG tablet Take 1 tablet by mouth every 8 hours 1/22/19   Mikhail Thomas MD   nitroGLYCERIN (NITROSTAT) 0.4 MG SL tablet Place 1 tablet under the tongue every 5 minutes as needed for Chest pain 1/8/18   Mikhail Thomas MD   vitamin D (CHOLECALCIFEROL) 1000 UNIT TABS tablet Take 1 tablet by mouth daily 1/8/18   Mikhail Thomas MD   cyclobenzaprine (FLEXERIL) 10 MG tablet Take 1 tablet by mouth 3 times daily as needed for Muscle spasms 1/8/18   Mikhail Thomas MD   Calcium Carbonate-Vitamin D (CALCIUM 500/D) 500-125 MG-UNIT TABS Take 1 tablet by mouth 2 times daily     Historical Provider, MD Allergies:     Bactrim [sulfamethoxazole-trimethoprim]; Codeine; and Seasonal    Social History:     Tobacco:    reports that she quit smoking about 1 years ago. Her smoking use included cigarettes. She started smoking about 23 years ago. She has a 10.00 pack-year smoking history. She has never used smokeless tobacco.  Alcohol:      reports that she does not drink alcohol. Drug Use:  reports that she does not use drugs. Family History:     Family History   Problem Relation Age of Onset    Stroke Mother     Diabetes Mother     Heart Disease Mother     High Blood Pressure Mother     Heart Disease Father     Heart Disease Brother     High Blood Pressure Brother     Heart Disease Maternal Grandmother     High Blood Pressure Sister        Review of Systems:     Positive and Negative as described in HPI. CONSTITUTIONAL:  negative for fevers, chills, sweats, fatigue, weight loss  HEENT:  negative for vision, hearing changes, runny nose, throat pain  RESPIRATORY:  negative for shortness of breath, cough, congestion, wheezing. CARDIOVASCULAR:  negative for chest pain, palpitations.   GASTROINTESTINAL:  negative for nausea, vomiting, diarrhea, constipation, change in bowel habits, abdominal pain   GENITOURINARY:  negative for difficulty of urination, burning with urination, frequency   INTEGUMENT:  negative for rash, skin lesions, easy bruising   HEMATOLOGIC/LYMPHATIC:  negative for swelling/edema   ALLERGIC/IMMUNOLOGIC:  negative for urticaria , itching  ENDOCRINE:  negative increase in drinking, increase in urination, hot or cold intolerance  MUSCULOSKELETAL:  negative joint pains, muscle aches, swelling of joints  NEUROLOGICAL: Positive for frequent falls walks with a walker no focal neurological deficit  BEHAVIOR/PSYCH:  negative for depression, anxiety    Physical Exam:     BP (!) 149/57   Pulse 71   Temp 98.5 °F (36.9 °C)   Resp 17   Ht 5' 4\" (1.626 m)   Wt 180 lb (81.6 kg)   SpO2 98%   BMI 30.90 kg/m²   Temp (24hrs), Av.9 °F (36.6 °C), Min:97.2 °F (36.2 °C), Max:98.5 °F (36.9 °C)    Recent Labs     19  1430   POCGLU 76       Intake/Output Summary (Last 24 hours) at 2019 1026  Last data filed at 2019 0900  Gross per 24 hour   Intake 1715.08 ml   Output 2300 ml   Net -584.92 ml     Morbid obesity  General Appearance:  alert, well appearing, and in no acute distress  Mental status: oriented to person, place, and time with normal affect  Head:  normocephalic, atraumatic. Eye: no icterus, redness, pupils equal and reactive, extraocular eye movements intact, conjunctiva clear  Ear: normal external ear, no discharge, hearing intact  Nose:  no drainage noted  Mouth: mucous membranes moist  Neck: supple, no carotid bruits, thyroid not palpable  Lungs: Bilateral equal air entry, clear to ausculation, no wheezing, rales or rhonchi, normal effort  Cardiovascular: normal rate, regular rhythm, no murmur, gallop, rub.   Abdomen: Soft, nontender, nondistended, normal bowel sounds, no hepatomegaly or splenomegaly  Neurologic: There are no new focal motor or sensory deficits, normal muscle tone and bulk, no abnormal sensation, normal speech, cranial nerves II through XII grossly intact  Gait not checked patient walks with a walker leaning forward  Skin: No gross lesions, rashes, bruising or bleeding on exposed skin area  Extremities:  peripheral pulses palpable, no pedal edema or calf pain with palpation  Psych: normal affect    Investigations:      Laboratory Testing:  Recent Results (from the past 24 hour(s))   POC Glucose Fingerstick    Collection Time: 19  2:30 PM   Result Value Ref Range    POC Glucose 76 65 - 105 mg/dL   CBC    Collection Time: 19  4:47 AM   Result Value Ref Range    WBC 4.6 3.5 - 11.0 k/uL    RBC 3.25 (L) 4.0 - 5.2 m/uL    Hemoglobin 10.1 (L) 12.0 - 16.0 g/dL    Hematocrit 31.1 (L) 36 - 46 %    MCV 95.9 80 - 100 fL    MCH 31.0 26 - 34 pg    MCHC 32.3 31 - 37 g/dL RDW 14.3 11.5 - 14.9 %    Platelets 196 043 - 432 k/uL    MPV 8.0 6.0 - 12.0 fL    NRBC Automated NOT REPORTED per 100 WBC   BASIC METABOLIC PANEL    Collection Time: 06/01/19  4:47 AM   Result Value Ref Range    Glucose 111 (H) 70 - 99 mg/dL    BUN 26 (H) 8 - 23 mg/dL    CREATININE 1.03 (H) 0.50 - 0.90 mg/dL    Bun/Cre Ratio NOT REPORTED 9 - 20    Calcium 7.9 (L) 8.6 - 10.4 mg/dL    Sodium 143 135 - 144 mmol/L    Potassium 4.9 3.7 - 5.3 mmol/L    Chloride 113 (H) 98 - 107 mmol/L    CO2 21 20 - 31 mmol/L    Anion Gap 9 9 - 17 mmol/L    GFR Non-African American 53 (L) >60 mL/min    GFR African American >60 >60 mL/min    GFR Comment          GFR Staging NOT REPORTED        Imaging/Diagonstics:      Assessment :      Primary Problem  <principal problem not specified>    Active Hospital Problems    Diagnosis Date Noted    BIN (acute kidney injury) ([facility] [address] [N17.9] 08/05/2015     Priority: High    Closed head injury [S09.90XA]     Scalp laceration [S01.01XA]     Abnormal finding on imaging [R93.89]     Other irritable bowel syndrome [K58.8]     Frequent falls [R29.6]     Double vision [H53.2]     Muscle soreness [M79.10]        Plan:   Acute renal failure likely prerenal  Hold Lasix is losartan no NSAIDs  Gentle hydration  1. Frequent falls MRI brain is nil acute recent MRI of the lower back had MRI done which shows a degenerative disc disease  2. Physical therapy occupational therapy to improve her lower oximetry strength and proper balance coordination  3. Type IIN STEMI secondary to renal failure denies any chest pain no further cardiac workup at this time  4.  May 31   mri lumbar plannoted  May need ecf for reha  Fall risk  June 1  Eliquis has been held as patient has been bleeding from the scalp and forearm fall risk  At this time is the highest to restart the full dose and accommodation will patient had a 1 history of DVT last year somewhat she has completed 6 months of and accommodation given that she is a

## 2019-06-01 NOTE — PLAN OF CARE
Problem: Falls - Risk of:  Goal: Will remain free from falls  Description  Will remain free from falls  6/1/2019 1609 by Spencer Elias RN  Outcome: Ongoing  Note:   The patient remained free from falls this shift, call light within reach, bed in locked and lowest position. Side rails up x2. Continue to monitor closely. Problem: Tissue Perfusion - Renal, Altered:  Goal: Serum creatinine will be within specified parameters  Description  Serum creatinine will be within specified parameters  6/1/2019 1609 by Spencer Elias RN  Outcome: Ongoing  Note:   Serum creatinine has improved. Will watch for CK levels tomorrow. Problem: Pain:  Goal: Pain level will decrease  Description  Pain level will decrease  6/1/2019 1609 by Spencer Elias RN  Outcome: Ongoing  Note:   Pt medicated with pain medication prn. Assessed all pain characteristics including level, type, location, frequency, and onset. Non-pharmacologic interventions offered to pt as well. Pt states pain is tolerable at this time.  Will continue to monitor

## 2019-06-01 NOTE — CARE COORDINATION
DISCHARGE PLANNING NOTE:    LSW following for SNF- [facility] as pt's spouse feels she needs rehab d/t falling 7x in 2 days. Referral faxed 5/31. PT/OT to see. Pt's spouse is otherwise agreeable to [facility]. Will continue to follow for additional d/c needs.     Electronically signed by Radha Spears RN on 6/1/2019 at 10:37 AM

## 2019-06-01 NOTE — PROGRESS NOTES
gabapentin (NEURONTIN) capsule 300 mg Nightly   hydrALAZINE (APRESOLINE) tablet 100 mg 3 times per day   nitroGLYCERIN (NITROSTAT) SL tablet 0.4 mg Q5 Min PRN   oxyCODONE-acetaminophen (PERCOCET) 5-325 MG per tablet 1 tablet Q8H PRN   linagliptin (TRADJENTA) tablet 5 mg Daily   heparin (porcine) injection 5,000 Units BID       Allergies:  Bactrim [sulfamethoxazole-trimethoprim]; Codeine; and Seasonal      Objective:  CURRENT TEMPERATURE:  Temp: 97 °F (36.1 °C)  MAXIMUM TEMPERATURE OVER 24HRS:  Temp (24hrs), Av.7 °F (36.5 °C), Min:97 °F (36.1 °C), Max:98.5 °F (36.9 °C)    CURRENT RESPIRATORY RATE:  Resp: 16  CURRENT PULSE:  Pulse: 73  CURRENT BLOOD PRESSURE:  BP: (!) 157/78  24HR BLOOD PRESSURE RANGE:  Systolic (15KSX), VVW:557 , Min:129 , UQW:420   ; Diastolic (12NCN), LQH:03, Min:52, Max:84    24HR INTAKE/OUTPUT:      Intake/Output Summary (Last 24 hours) at 2019 1119  Last data filed at 2019 1044  Gross per 24 hour   Intake 1715.08 ml   Output 2800 ml   Net -1084.92 ml     Patient Vitals for the past 96 hrs (Last 3 readings):   Weight   19 0648 180 lb (81.6 kg)       Physical Exam:  GENERAL APPEARANCE: Alert and cooperative, and appears to be in no acute distress. HEAD: normocephalic  EYES: PERRL, EOMI. Not pale, anicteric   NOSE:  No nasal discharge. THROAT:  Oral cavity and pharynx normal. Moist  CARDIAC: Normal S1 and S2. No S3, S4 or murmurs. Rhythm is regular. LUNGS: Clear to auscultation and percussion without rales, rhonchi, wheezing or diminished breath sounds. NECK: Neck supple, non-tender without lymphadenopathy, masses or thyromegaly. JVD-neg  BACK: Examination of the spine reveals normal gait and posture, no spinal deformity, symmetry of spinal muscles, without tenderness, decreased range of motion or muscular spasm  MUSKULOSKELETAL: Adequately aligned spine. No joint erythema or tenderness. EXTREMITIES: Trace edema. Peripheral pulses intact.    NEURO: Nonfocal      Labs: CBC:  Recent Labs     05/30/19  0700 05/31/19  0451 06/01/19  0447   WBC 7.9 7.1 4.6   RBC 3.38* 3.31* 3.25*   HGB 10.5* 10.3* 10.1*   HCT 32.3* 31.8* 31.1*   MCV 95.6 96.2 95.9   MCH 31.2 31.3 31.0   MCHC 32.6 32.5 32.3   RDW 14.4 14.3 14.3    283 287   MPV 7.6 8.1 8.0      BMP:   Recent Labs     05/30/19  0700 05/31/19  0451 06/01/19  0447    142 143   K 4.5 4.9 4.9    113* 113*   CO2 20 20 21   BUN 80* 43* 26*   CREATININE 3.54* 1.28* 1.03*   GLUCOSE 127* 101* 111*   CALCIUM 8.2* 7.8* 7.9*        Urine Sodium:    Recent Labs     05/30/19  0835   RAISA 45      Urine Creatinine:    Recent Labs     05/30/19  0835   LABCREA 236.1*     Urine Eosinophils: Invalid input(s): EOSU  Urine Protein:  No results for input(s): TPU in the last 72 hours. Urinalysis:    Recent Labs     05/30/19  0835   NITRU NEGATIVE   COLORU YELLOW   PHUR 5.0   WBCUA 0 TO 2   RBCUA 0 TO 2   MUCUS NOT REPORTED   TRICHOMONAS NOT REPORTED   YEAST NOT REPORTED   BACTERIA FEW*   SPECGRAV 1.019   LEUKOCYTESUR NEGATIVE   UROBILINOGEN Normal   BILIRUBINUR Presumptive positive. Unable to confirm due to unavailability of reagent. *   GLUCOSEU NEGATIVE   [address] NEGATIVE   AMORPHOUS NOT REPORTED         Radiology:  Reviewed as available. Assessment:  1. BIN on CKD, oliguric, likely causes includes prerenal azotemia vs ATN due to hypovolemia, use of diuretics, NSAIDs, ACE inhibitor's        UA unremarkable no protein no blood. Renal ultrasound unremarkable no hydronephrosis    Plan   Continue IVF 0.9 saline at 50 cc/hr-check CPK and if less than 300-KVO IVF today    2. CKD stage 3 2nd to nephrosclerosis baseline serum creatinine- 0.8 to  1.2 mg/dL . 3.  Fall, orthostatics negative-possibly related to spinal degenerative disease. disease. CT head no acute intracranial abnormality. #4 type 2 diabetes    5.   CHF with preserved EF and LVH with  diastolic dysfunction  Currently not in fluid overload, proBNP 173      6.Hypertension-uncontrolled -lisinopril on hold due to  BIN-start amlodipine 5 mg dlym AM.    Plan:  Metformin, lisinopril and lasix on hold.       Electronically signed by Vicenta Cooks, MD  6/1/2019 at 11:19 AM

## 2019-06-01 NOTE — PROGRESS NOTES
[facility]    Gastroenterology Daily Progress Note      Patient:   Erlinda Membreno   :    1950   Facility:   [address] [place] [address]  Date:     2019  Consultant:   Baldemar Benavides, CNP      SUBJECTIVE  77 y.o. female admitted 2019 with BIN (acute kidney injury) ([facility] [address] [N17.9] and seen for abnormal imaging. The pt was seen and examined. She is s/p EGD yesterday that showed spasms of the esophagus. She denies dysphagia, abdominal pain and nausea. She is tolerating a regular diet.          OBJECTIVE  Scheduled Meds:   famotidine  20 mg Oral BID    [START ON 2019] amLODIPine  5 mg Oral Daily    pramipexole  0.75 mg Oral Nightly    Tetanus-Diphth-Acell Pertussis  0.5 mL Intramuscular Once    sodium chloride flush  10 mL Intravenous 2 times per day    carvedilol  12.5 mg Oral BID    cholestyramine light  1 packet Oral BID    citalopram  10 mg Oral Daily    gabapentin  300 mg Oral Nightly    hydrALAZINE  100 mg Oral 3 times per day    linagliptin  5 mg Oral Daily    heparin (porcine)  5,000 Units Subcutaneous BID       Vital Signs:  BP (!) 163/74   Pulse 73   Temp 97 °F (36.1 °C) (Oral)   Resp 16   Ht 5' 4\" (1.626 m)   Wt 180 lb (81.6 kg)   SpO2 97%   BMI 30.90 kg/m²      Physical Exam:   General appearance: Alert & oriented, NAD  Lungs: CTA bilaterally    Heart: S1S2 RRR  Abdomen: Soft, Nontender, Not distended, BS WNL obese  Skin: No jaundice, No clubbing, No cyanosis    Lab and Imaging Review     CBC  Recent Labs     19  0700 19  0451 19  0447   WBC 7.9 7.1 4.6   HGB 10.5* 10.3* 10.1*   HCT 32.3* 31.8* 31.1*   MCV 95.6 96.2 95.9    283 287       BMP  Recent Labs     19  0700 19  0451 19  0447    142 143   K 4.5 4.9 4.9    113* 113*   CO2 20 20 21   BUN 80* 43* 26*   CREATININE 3.54* 1.28* 1.03*   GLUCOSE 127* 101* 111*   CALCIUM 8.2* 7.8* 7.9*     PROCEDURE NOTE     DATE OF PROCEDURE: 2019      SURGEON: Leni Waldron Susana Hurtado MD        PREOPERATIVE DIAGNOSIS: ABNORMAL IMAGING        OPERATION: Upper GI endoscopy with Biopsy         Findings:     Retropharyngeal area was grossly normal appearing     Esophagus: abnormal: EXTENSIVE SPASMS OF THE ESOPHAGUS  NO LESION  NO ULCERS     Stomach:    Fundus: normal    Body: normal    Antrum: normal     Duodenum:     Descending: normal    Bulb: normal     FINDINGS:5/30/19   CT HEAD:       BRAIN/VENTRICLES: Ventricles normal in size and location.  Basal cisterns   normally outlined.  No intra or extra-axial hemorrhage.  No acute infarct. No abnormal fluid collections.       ORBITS: The visualized portion of the orbits demonstrate no acute abnormality.       SINUSES: The visualized paranasal sinuses and mastoid air cells demonstrate   no acute abnormality.  Stable polyp/mucous retention cyst in maxillary   sinuses and fibro-osseous lesion along the posterolateral wall right   maxillary sinus projecting into the sinus.       SOFT TISSUES/SKULL:  No acute abnormality of the visualized skull or soft   tissues.       CT CERVICAL SPINE:       BONES/ALIGNMENT: There is no evidence of an acute cervical spine fracture. There is normal alignment of the cervical spine.       DEGENERATIVE CHANGES: No significant degenerative changes.       SOFT TISSUES: There is no prevertebral soft tissue swelling.  There is large   amount of soft tissue material in dilated esophagus presumed to be food   debris.           Impression   No acute intracranial abnormality.       No acute traumatic injury of the cervical spine.       Incidentally, visualized esophagus is dilated and filled with material   presumed feeding food debris.  This could be due to distal esophageal   pathology not imaged on this exam.  Please correlate clinically.  Further   imaging as may be deemed appropriate.               ASSESSMENT/PLAN:  1. Abnormal CT imaging suggesting a dilated esophagus with debris; s/p EGD yesterday showing spasms. Tolerating a diet and denies dysphagia today.    -no further GI intervention at this time, please reconsult if needed GI signing off              This plan was formulated in collaboration with Dr. Tanika Corona    Electronically signed by: MAIK Loyd CNP on 6/1/2019 at 1:33 PM     Attending Physician Statement  I have discussed the care of Maral More and   I have examined the patient myselft and taken ros and hpi , including pertinent history and exam findings,  with the author of this note . I have reviewed the key elements of all parts of the encounter with the nurse practitioner/resident.     I agree with the assessment, plan and orders as documented by the above health care provider       Electronically signed by Quin Stevens MD

## 2019-06-01 NOTE — PROGRESS NOTES
[address]   Occupational Therapy Evaluation  Date: 19  Patient Name: Rei Srinivasan       Room: -  MRN: [id number]  Account: [de-identified]   : 1950  (78 y.o.) Gender: female     Discharge Recommendations:  Subacute/Skilled Nursing Facility(due to high number of recent falls, patient would benefit from rehab stay prior to return home)  Equipment Needed: (TBD)    Referring Practitioner: Mitchell Villatoro MD  Diagnosis: BIN   Additional Pertinent Hx: per ED note: presents after a fall. Patient states she fell to the ground and hit her head on the ground. She does not know how she fell. No dizziness or lightheadedness prior to the fall. States she laid on the ground for about a half hour before someone helped her up. She is complaining of neck and head pain at this time. Patient reports having 5-7 falls within past month     Treatment Diagnosis: impaired self care status   Past Medical History:  has a past medical history of Allergic rhinitis, cause unspecified, Back pain, Bowel obstruction (HCC), CAD (coronary artery disease), Cellulitis, Cellulitis, Diverticulosis of colon (without mention of hemorrhage), GERD (gastroesophageal reflux disease), History of MI (myocardial infarction), History of ovarian cyst, History of peritonitis, HTN (hypertension), Hx of blood clots, Hyperlipidemia, Intestinal or peritoneal adhesions with obstruction (postoperative) (postinfection) ([facility] [address] Kidney infection, Lateral epicondylitis  of elbow, Muscle strain, Other abnormal glucose, Restless legs syndrome (RLS), Vitamin D deficiency, and Wears glasses. Past Surgical History:   has a past surgical history that includes Cardiac catheterization; Ovary removal (); colectomy (); Appendectomy (); Ovary removal (); Hysterectomy (); Abdomen surgery (); Cardiac catheterization (); Bunionectomy (Left); sinus surgery ();  Colonoscopy; other surgical history (14); cyst Dressing: Supervision  LE Dressing: Supervision  Toileting: Supervision  Additional Comments: Patient is currently able to perform all ADL tasks with supervision, however due to history of multiple falls, requires supervision for mobility and activity. UE Function                 LUE Tone: Normotonic     LUE AROM (degrees)  LUE AROM : WFL     Left Hand AROM (degrees)  Left Hand AROM: WFL         RUE Tone: Normotonic     RUE AROM (degrees)  RUE AROM : WFL     Right Hand AROM (degrees)  Right Hand AROM: WFL    Fine Motor Skills  Coordination  Movements Are Fluid And Coordinated:  Yes                           Mobility          Balance  Sitting Balance: Supervision  Standing Balance: Stand by assistance        Bed mobility  Comment: patient up in chair upon OT arrival and returned to chair upon OT exit, with chair alarm      Transfers  Sit to stand: Stand by assistance, Supervision  Stand to sit: Stand by assistance, Supervision      Assessment  Performance deficits / Impairments: Decreased functional mobility , Decreased ADL status, Decreased safe awareness, Decreased balance  Treatment Diagnosis: impaired self care status   Prognosis: Good, Fair  Decision Making: Low Complexity  Patient Education: OT POC/goals, safety, d/c planning   REQUIRES OT FOLLOW UP: No  Discharge Recommendations: Subacute/Skilled Nursing Facility(due to high number of recent falls, patient would benefit from rehab stay prior to return home)  Activity Tolerance: Patient Tolerated treatment well         Functional Outcome Measures  AM-PAC Daily Activity Inpatient   How much help for putting on and taking off regular lower body clothing?: A Little  How much help for Bathing?: A Little  How much help for Toileting?: A Little  How much help for putting on and taking off regular upper body clothing?: A Little  How much help for taking care of personal grooming?: None  How much help for eating meals?: None  AM-PAC Inpatient Daily Activity Raw Score: 20  AM-PAC Inpatient ADL T-Scale Score : 42.03  ADL Inpatient CMS 0-100% Score: 38.32  ADL Inpatient CMS G-Code Modifier : CJ       Goals  Short term goals  Time Frame for Short term goals: by discharge, patient will  Short term goal 1: perform BADL tasks with I/Mod I  Short term goal 2: perform functional transfers/mobility throughout ADL routine  Short term goal 3: V/D Good understanding of fall prevention techniques and home safety   Short term goal 4: actively participate in 15+ minutes of therapeutic exercise/activity to promote increased safety and independence for self care and mobility tasks     Plan  Safety Devices  Safety Devices in place: Yes  Type of devices:  All fall risk precautions in place, Call light within reach, Chair alarm in place, Patient at risk for falls, Left in chair     Plan  Times per week: 5-7x/wk  Current Treatment Recommendations: Functional Mobility Training, Patient/Caregiver Education & Training, Equipment Evaluation, Education, & procurement, Balance Training    OT Equipment Recommendations  Equipment Needed: (TBD)  OT Individual Minutes  Time In: 4570  Time Out: 8252  Minutes: 23    Electronically signed by Jean Marie Joaquin OT on 6/1/19 at 4:25 PM

## 2019-06-02 VITALS
DIASTOLIC BLOOD PRESSURE: 69 MMHG | TEMPERATURE: 98.2 F | WEIGHT: 184.75 LBS | HEIGHT: 64 IN | RESPIRATION RATE: 14 BRPM | SYSTOLIC BLOOD PRESSURE: 161 MMHG | OXYGEN SATURATION: 98 % | HEART RATE: 91 BPM | BODY MASS INDEX: 31.54 KG/M2

## 2019-06-02 LAB
ANION GAP SERPL CALCULATED.3IONS-SCNC: 11 MMOL/L (ref 9–17)
BUN BLDV-MCNC: 20 MG/DL (ref 8–23)
BUN/CREAT BLD: ABNORMAL (ref 9–20)
CALCIUM SERPL-MCNC: 8.7 MG/DL (ref 8.6–10.4)
CHLORIDE BLD-SCNC: 110 MMOL/L (ref 98–107)
CO2: 20 MMOL/L (ref 20–31)
CREAT SERPL-MCNC: 0.96 MG/DL (ref 0.5–0.9)
GFR AFRICAN AMERICAN: >60 ML/MIN
GFR NON-AFRICAN AMERICAN: 58 ML/MIN
GFR SERPL CREATININE-BSD FRML MDRD: ABNORMAL ML/MIN/{1.73_M2}
GFR SERPL CREATININE-BSD FRML MDRD: ABNORMAL ML/MIN/{1.73_M2}
GLUCOSE BLD-MCNC: 109 MG/DL (ref 70–99)
HCT VFR BLD CALC: 32.5 % (ref 36–46)
HEMOGLOBIN: 10.7 G/DL (ref 12–16)
MCH RBC QN AUTO: 31.5 PG (ref 26–34)
MCHC RBC AUTO-ENTMCNC: 32.9 G/DL (ref 31–37)
MCV RBC AUTO: 95.6 FL (ref 80–100)
NRBC AUTOMATED: ABNORMAL PER 100 WBC
PDW BLD-RTO: 14.1 % (ref 11.5–14.9)
PLATELET # BLD: 305 K/UL (ref 150–450)
PMV BLD AUTO: 7 FL (ref 6–12)
POTASSIUM SERPL-SCNC: 5 MMOL/L (ref 3.7–5.3)
RBC # BLD: 3.4 M/UL (ref 4–5.2)
SODIUM BLD-SCNC: 141 MMOL/L (ref 135–144)
TOTAL CK: 274 U/L (ref 26–192)
WBC # BLD: 5.5 K/UL (ref 3.5–11)

## 2019-06-02 PROCEDURE — 80048 BASIC METABOLIC PNL TOTAL CA: CPT

## 2019-06-02 PROCEDURE — 99232 SBSQ HOSP IP/OBS MODERATE 35: CPT | Performed by: INTERNAL MEDICINE

## 2019-06-02 PROCEDURE — 6370000000 HC RX 637 (ALT 250 FOR IP): Performed by: INTERNAL MEDICINE

## 2019-06-02 PROCEDURE — 36415 COLL VENOUS BLD VENIPUNCTURE: CPT

## 2019-06-02 PROCEDURE — 82550 ASSAY OF CK (CPK): CPT

## 2019-06-02 PROCEDURE — 6360000002 HC RX W HCPCS: Performed by: INTERNAL MEDICINE

## 2019-06-02 PROCEDURE — 85027 COMPLETE CBC AUTOMATED: CPT

## 2019-06-02 PROCEDURE — 2580000003 HC RX 258: Performed by: INTERNAL MEDICINE

## 2019-06-02 RX ORDER — LISINOPRIL 10 MG/1
10 TABLET ORAL DAILY
Status: DISCONTINUED | OUTPATIENT
Start: 2019-06-02 | End: 2019-06-02 | Stop reason: HOSPADM

## 2019-06-02 RX ORDER — LISINOPRIL 10 MG/1
10 TABLET ORAL DAILY
Qty: 30 TABLET | Refills: 3 | Status: SHIPPED | OUTPATIENT
Start: 2019-06-02 | End: 2019-06-27 | Stop reason: SDUPTHER

## 2019-06-02 RX ORDER — AMLODIPINE BESYLATE 10 MG/1
10 TABLET ORAL DAILY
Qty: 30 TABLET | Refills: 1 | Status: SHIPPED | OUTPATIENT
Start: 2019-06-03 | End: 2019-06-27 | Stop reason: SDUPTHER

## 2019-06-02 RX ADMIN — AMLODIPINE BESYLATE 5 MG: 5 TABLET ORAL at 08:26

## 2019-06-02 RX ADMIN — OXYCODONE HYDROCHLORIDE AND ACETAMINOPHEN 1 TABLET: 5; 325 TABLET ORAL at 04:53

## 2019-06-02 RX ADMIN — OXYCODONE HYDROCHLORIDE AND ACETAMINOPHEN 1 TABLET: 5; 325 TABLET ORAL at 13:21

## 2019-06-02 RX ADMIN — CITALOPRAM HYDROBROMIDE 10 MG: 10 TABLET ORAL at 08:26

## 2019-06-02 RX ADMIN — HYDRALAZINE HYDROCHLORIDE 100 MG: 50 TABLET, FILM COATED ORAL at 04:53

## 2019-06-02 RX ADMIN — LINAGLIPTIN 5 MG: 5 TABLET, FILM COATED ORAL at 08:26

## 2019-06-02 RX ADMIN — HYDRALAZINE HYDROCHLORIDE 5 MG: 20 INJECTION INTRAMUSCULAR; INTRAVENOUS at 03:04

## 2019-06-02 RX ADMIN — FAMOTIDINE 20 MG: 20 TABLET ORAL at 08:26

## 2019-06-02 RX ADMIN — CARVEDILOL 12.5 MG: 12.5 TABLET, FILM COATED ORAL at 08:26

## 2019-06-02 RX ADMIN — HYDRALAZINE HYDROCHLORIDE 5 MG: 20 INJECTION INTRAMUSCULAR; INTRAVENOUS at 11:18

## 2019-06-02 RX ADMIN — Medication 10 ML: at 08:29

## 2019-06-02 RX ADMIN — HYDRALAZINE HYDROCHLORIDE 100 MG: 50 TABLET, FILM COATED ORAL at 13:20

## 2019-06-02 RX ADMIN — CHOLESTYRAMINE 4 G: 4 POWDER, FOR SUSPENSION ORAL at 08:26

## 2019-06-02 RX ADMIN — HEPARIN SODIUM 5000 UNITS: 5000 INJECTION, SOLUTION INTRAVENOUS; SUBCUTANEOUS at 08:25

## 2019-06-02 ASSESSMENT — PAIN SCALES - GENERAL
PAINLEVEL_OUTOF10: 4
PAINLEVEL_OUTOF10: 2
PAINLEVEL_OUTOF10: 4
PAINLEVEL_OUTOF10: 0
PAINLEVEL_OUTOF10: 10

## 2019-06-02 ASSESSMENT — PAIN DESCRIPTION - LOCATION: LOCATION: HEAD

## 2019-06-02 NOTE — DISCHARGE SUMMARY
Hospital Discharge Summary      Patient ID: Boris Seen      Patient's PCP: Shannon Davies MD    Admit Date: 5/30/2019     Discharge Date:       Admitting Physician: Shannon Davies MD    Discharge Physician: Samuel Riggs MD     Discharge Diagnoses: Active Hospital Problems    Diagnosis Date Noted    BIN (acute kidney injury) ([facility] [address] [N17.9] 08/05/2015     Priority: High    Closed head injury [S09.90XA]     Scalp laceration [S01.01XA]     Abnormal finding on imaging [R93.89]     Other irritable bowel syndrome [K58.8]     Frequent falls [R29.6]     Double vision [H53.2]     Muscle soreness [M79.10]        The patient was seen and examined on day of discharge and this discharge summary is in conjunction with any daily progress note from day of discharge. Hospital Course: satisfactory   The primary encounter diagnosis was Closed head injury, initial encounter. Diagnoses of Laceration of scalp, initial encounter and BIN (acute kidney injury) ([facility] were also pertinent to this visit. Consults: as noted in the chart    Treatments: as above    Disposition: home    Discharged Condition: Stable    Follow Up:  No follow-ups on file.     Discharge Medications:    Shaneka Mcleod   Home Medication Instructions [id number]    Printed on:06/02/19 1057   Medication Information                      acetaminophen (TYLENOL) 500 MG tablet  Take 500 mg by mouth every 6 hours as needed for Pain             atorvastatin (LIPITOR) 80 MG tablet  Take 1 tablet by mouth nightly             Calcium Carbonate-Vitamin D (CALCIUM 500/D) 500-125 MG-UNIT TABS  Take 1 tablet by mouth 2 times daily              carvedilol (COREG) 12.5 MG tablet  Take 1 tablet by mouth 2 times daily             cholestyramine (QUESTRAN) 4 g packet  Take 1 packet by mouth 2 times daily             citalopram (CELEXA) 10 MG tablet  Take 1 tablet by mouth daily             cyanocobalamin 1000 MCG/ML injection  Inject 1 mL into the muscle once for 1 dose             cyclobenzaprine (FLEXERIL) 10 MG tablet  Take 1 tablet by mouth 3 times daily as needed for Muscle spasms             famotidine (PEPCID) 20 MG tablet  Take 1 tablet by mouth 2 times daily             fenofibrate micronized (LOFIBRA) 134 MG capsule  Take 1 capsule by mouth every morning (before breakfast)             furosemide (LASIX) 40 MG tablet  Take 1 tablet by mouth daily             gabapentin (NEURONTIN) 300 MG capsule  Take 1 capsule by mouth nightly for 180 days. hydrALAZINE (APRESOLINE) 100 MG tablet  Take 1 tablet by mouth every 8 hours             lidocaine (XYLOCAINE) 5 % ointment  4-5 times a day to your right thigh for pain. lisinopril (PRINIVIL;ZESTRIL) 40 MG tablet  Take 1 tablet by mouth daily             meloxicam (MOBIC) 15 MG tablet  Take 1 tablet by mouth daily             nitroGLYCERIN (NITROSTAT) 0.4 MG SL tablet  Place 1 tablet under the tongue every 5 minutes as needed for Chest pain             oxyCODONE-acetaminophen (PERCOCET) 5-325 MG per tablet  Take 1 tablet by mouth 2 times daily as needed for Pain for up to 30 days. pramipexole (MIRAPEX) 1 MG tablet  Take 1.5 tablets by mouth daily             SITagliptin (JANUVIA) 100 MG tablet  Take 1 tablet by mouth daily             spironolactone (ALDACTONE) 25 MG tablet  Take 1 tablet by mouth daily             vitamin D (CHOLECALCIFEROL) 1000 UNIT TABS tablet  Take 1 tablet by mouth daily                  Activity: activity as tolerated unless otherwise noted     Electronically signed by Vikram Singh MD on 6/2/2019 at 10:53 AM     Thank you Marvis Osler, MD for the opportunity to be involved in this patient's care.

## 2019-06-02 NOTE — CARE COORDINATION
Continuity of Care Form    Patient Name: James Lubin   :  1950  MRN:  [id number]    Admit date:  2019  Discharge date:  19    Code Status Order: Full Code   Advance Directives:   Advance Care Flowsheet Documentation     Date/Time Healthcare Directive Type of Healthcare Directive Copy in [address] Agent's Name Healthcare Agent's Phone Number    19 1239  Yes, patient has an advance directive for healthcare treatment  Durable power of  for health care  Yes, copy in chart  Healthcare power of   Merlin Rhein -   [phone]    19 [phone]  Yes, patient has an advance directive for healthcare treatment  Durable power of  for health care;Living will  No, copy requested from family  --  --  --    19 1011  Yes, patient has an advance directive for healthcare treatment  Living will;Durable power of  for health care  No, copy requested from family  Spouse  Merlin Rhein  [phone]          Admitting Physician:  Rosalind Holly MD  PCP: Rosalind Holly MD    Discharging Nurse: [facility] Unit/Room#: 2121/2121-01  Discharging Unit Phone Number: [phone]    Emergency Contact:   Extended Emergency Contact Information  Primary Emergency Contact: Jing Davis  Address: [address] [place] [address] Phone: [phone]  Work Phone: [phone]  Mobile Phone: [phone]  Relation: Spouse  Hearing or visual needs: None  Other needs: None  Preferred language: English   needed?  No    Past Surgical History:  Past Surgical History:   Procedure Laterality Date    ABDOMEN SURGERY      benign tumor removed near remaining overy, 1.5 pounds    APPENDECTOMY  1968    appendix ruptured, developed peritonitis    BUNIONECTOMY Left     along with calcium deposits removed   [address]      negative    COLECTOMY [facility] D/T OBSTRUCTION    COLONOSCOPY      CYST REMOVAL Right     right facial    HYSTERECTOMY  1973    taken as a result of recurring cysts    OTHER SURGICAL HISTORY  8/14/14    FESS    OVARY REMOVAL  1970    UNILATERAL due to cyst    OVARY REMOVAL  1971    partial, due to cyst    SINUS SURGERY  2004    UPPER GASTROINTESTINAL ENDOSCOPY N/A 5/31/2019    EGD ESOPHAGOGASTRODUODENOSCOPY performed by Jessica Carrera MD at [address] Left 3/5/2019    WRIST OPEN REDUCTION INTERNAL FIXATION performed by Corinna Lares MD at [address]       Immunization History:   Immunization History   Administered Date(s) Administered    Influenza, High Dose (Fluzone 65 yrs and older) 11/17/2017    Pneumococcal 13-valent Conjugate ([id number]) 05/23/2017    Pneumococcal Polysaccharide ([id number]) 05/10/2016       Active Problems:  Patient Active Problem List   Diagnosis Code    Other specified disorders of rotator cuff syndrome of shoulder and allied disorders M75.100    Diverticulosis of large intestine K57.30    Intestinal or peritoneal adhesions with obstruction (postoperative) (postinfection) ([facility] [address] K56.50    Restless legs syndrome (RLS) G25.81    GERD (gastroesophageal reflux disease) K21.9    Essential hypertension I10    Mixed hyperlipidemia E78.2    Other abnormal glucose R73.09    Atherosclerosis I70.90    Lateral epicondylitis M77.10    Allergic rhinitis J30.9    Vitamin D deficiency E55.9    Depression F32.9    Degenerative joint disease (DJD) of hip M16.9    Edema extremities R60.0    Injury of foot, left H44.639L    Facial droop R29.810    CVA (cerebral vascular accident) ([facility] [address] I63.9    BIN (acute kidney injury) ([facility] [address] N17.9    Bradycardia R00.1    Ataxia R27.0    Aphasia R47.01    TIA (transient ischemic attack) G45.9    Need for prophylactic vaccination and inoculation against cholera alone Z23    Chest pain R07.9    Osteopenia M85.80    Lumbago Assisted  Bathing  Assisted  Dressing  Assisted  Toileting  Assisted  Feeding  Independent  Med Admin  Assisted  Med Delivery   whole    Wound Care Documentation and Therapy:  Wound 05/30/19 Head Left;Upper (Active)   Wound Traumatic 6/2/2019  9:00 AM   Dressing Status Clean;Dry; Intact 6/2/2019  9:20 AM   Dressing Changed Other (Comment) 6/2/2019  9:20 AM   Dressing/Treatment Non adherent 6/2/2019  9:20 AM   Wound Assessment Edges well approximated 6/2/2019  9:20 AM   Drainage Amount None 6/2/2019  9:20 AM   Drainage Description Sanguinous 6/2/2019  1:00 AM   Odor None 6/2/2019  9:20 AM   Number of days: 2        Safety Concerns:     History of Falls (last 30 days) and At Risk for Falls    Impairments/Disabilities:      None    Nutrition Therapy:  Current Nutrition Therapy:   - Oral Diet:  General    Routes of Feeding: Oral  Liquids: No Restrictions  Daily Fluid Restriction: no  Last Modified Barium Swallow with Video (Video Swallowing Test): not done    Treatments at the Time of Hospital Discharge:   Respiratory Treatments: n/a  Oxygen Therapy:  is not on home oxygen therapy. Ventilator:    - No ventilator support    Rehab Therapies: Physical Therapy and Occupational Therapy  Weight Bearing Status/Restrictions: No weight bearing restirctions  Other Medical Equipment (for information only, NOT a DME order):     Other Treatments: skilled nursing assessment, medication education and monitoring    Patient's personal belongings (please select all that are sent with patient):  None    RN SIGNATURE:  Electronically signed by Erin Barajas RN on 6/2/19 at 11:51 AM    CASE MANAGEMENT/SOCIAL WORK SECTION    Inpatient Status Date: 5/30/19    Readmission Risk Assessment Score:  Readmission Risk              Risk of Unplanned Readmission:        13           Discharging to Facility/ [address]  Phone: [phone]  · Fax [phone]  ·     / signature: Electronically signed by Radha Spears RN on 6/2/19 at 11:29 AM    PHYSICIAN SECTION    Prognosis: Fair    Condition at Discharge: Stable    Rehab Potential (if transferring to Rehab): Good    Recommended Labs or Other Treatments After Discharge: see above    Physician Certification: I certify the above information and transfer of Heide Martins  is necessary for the continuing treatment of the diagnosis listed and that she requires Home Care for less 30 days. Update Admission H&P: No change in H&P    PHYSICIAN SIGNATURE:  Verbal order Dr. Lyndel Carrel Electronically signed by Radha Spears RN on 6/2/19 at 11:51 AM    Current Discharge Medication List     START taking these medications     Medication Dose   amLODIPine (NORVASC) 10 MG tablet 10 mg   Take 1 tablet by mouth daily   Quantity: 30 tablet Refills: 1           CONTINUE these medications which have NOT CHANGED     Medication Dose   cholestyramine (QUESTRAN) 4 g packet 4 g   Take 1 packet by mouth 2 times daily   Quantity: 90 packet Refills: 3       SITagliptin (JANUVIA) 100 MG tablet 100 mg   Take 1 tablet by mouth daily   Quantity: 90 tablet Refills: 3       meloxicam (MOBIC) 15 MG tablet 15 mg   Take 1 tablet by mouth daily   Quantity: 90 tablet Refills: 3       oxyCODONE-acetaminophen (PERCOCET) 5-325 MG per tablet 1 tablet   Take 1 tablet by mouth 2 times daily as needed for Pain for up to 30 days.    Quantity: 60 tablet Refills: 0       Comments: Reduce doses taken as pain becomes manageable fill 5-18-19      acetaminophen (TYLENOL) 500 MG tablet 500 mg   Take 500 mg by mouth every 6 hours as needed for Pain       fenofibrate micronized (LOFIBRA) 134 MG capsule 134 mg   Take 1 capsule by mouth every morning (before breakfast)   Quantity: 90 capsule Refills: 3       lisinopril (PRINIVIL;ZESTRIL) 40 MG tablet 40 mg   Take 1 tablet by mouth daily   Quantity: 90 tablet Refills: 3       citalopram (CELEXA) 10 MG tablet 10 mg   Take 1 tablet by mouth daily   Quantity: 90 tablet Refills: 3       spironolactone (ALDACTONE) 25 MG tablet 25 mg   Take 1 tablet by mouth daily   Quantity: 90 tablet Refills: 3       carvedilol (COREG) 12.5 MG tablet 12.5 mg   Take 1 tablet by mouth 2 times daily   Quantity: 180 tablet Refills: 3       gabapentin (NEURONTIN) 300 MG capsule 300 mg   Take 1 capsule by mouth nightly for 180 days. Quantity: 90 capsule Refills: 1       cyanocobalamin 1000 MCG/ML injection 1,000 mcg   Inject 1 mL into the muscle once for 1 dose   Quantity: 1 mL Refills: 0       lidocaine (XYLOCAINE) 5 % ointment    4-5 times a day to your right thigh for pain.    Quantity: 240 g Refills: 3       furosemide (LASIX) 40 MG tablet 40 mg   Take 1 tablet by mouth daily   Quantity: 90 tablet Refills: 2       famotidine (PEPCID) 20 MG tablet 20 mg   Take 1 tablet by mouth 2 times daily   Quantity: 180 tablet Refills: 3       pramipexole (MIRAPEX) 1 MG tablet 1.5 mg   Take 1.5 tablets by mouth daily   Quantity: 145 tablet Refills: 3       atorvastatin (LIPITOR) 80 MG tablet 80 mg   Take 1 tablet by mouth nightly   Quantity: 90 tablet Refills: 3       hydrALAZINE (APRESOLINE) 100 MG tablet 100 mg   Take 1 tablet by mouth every 8 hours   Quantity: 90 tablet Refills: 3       nitroGLYCERIN (NITROSTAT) 0.4 MG SL tablet 0.4 mg   Place 1 tablet under the tongue every 5 minutes as needed for Chest pain   Quantity: 75 tablet Refills: 3       vitamin D (CHOLECALCIFEROL) 1000 UNIT TABS tablet 1,000 Units   Take 1 tablet by mouth daily   Quantity: 90 tablet Refills: 3       cyclobenzaprine (FLEXERIL) 10 MG tablet 10 mg   Take 1 tablet by mouth 3 times daily as needed for Muscle spasms   Quantity: 270 tablet Refills: 3       Calcium Carbonate-Vitamin D (CALCIUM 500/D) 500-125 MG-UNIT TABS 1 tablet   Take 1 tablet by mouth 2 times daily            STOP taking these previous medications     Medication Dose Reason for Stopping Comments   (STOP TAKING) apixaban (ELIQUIS) 5 MG TABS tablet 5 mg

## 2019-06-02 NOTE — CONSULTS
[facility]    CONSULTATION / HISTORY AND PHYSICAL EXAMINATION            Date:   6/2/2019  Patient name:  Lady Gama  Date of admission:  5/30/2019  6:48 AM  MRN:   [id number]  Account:  [de-identified]  YOB: 1950  PCP:    Mera Patrick MD  Room:   2121/2121-01  Code Status:    Full Code    Physician Requesting Consult: Adrianna Higuera MD    Reason for Consult:  medmanagement    Chief Complaint:     Chief Complaint   Patient presents with   Kiara Banuelos    Head Laceration       History Obtained From:     pt    History of Present Illness:     Patient is a 21-year-old morbidly obese lady with a history of CVA TIA coronary artery disease with a stent with a normal renal functions in the past has been complaining of frequent falls from last few months for this typically from poor balance and walks with a walker leaning forward has seen a neurologist had MRI of the brain and lumbar spine and both done  Denies any chest pain and the nausea vomiting  No aggravating relieving factors    Past Medical History:     Past Medical History:   Diagnosis Date    Allergic rhinitis, cause unspecified     Back pain     lumbar    Bowel obstruction (HCC)     history of due to scar tissue, resolved non-surgically    CAD (coronary artery disease)     Cellulitis     left leg    Cellulitis 2017 August    leg left leg/bug bite    Diverticulosis of colon (without mention of hemorrhage)     GERD (gastroesophageal reflux disease)     History of MI (myocardial infarction) 2005    thought due to a blood clot    History of ovarian cyst 1970    had oopherectomy    History of peritonitis 1968    due to ruptured appendix age 13    HTN (hypertension)     Hx of blood clots     right leg    Hyperlipidemia     Intestinal or peritoneal adhesions with obstruction (postoperative) (postinfection) ([address]     Kidney infection     renal failure/sepsis/spider bite    Lateral epicondylitis  of elbow     Muscle strain     right posterior shoulder    Other abnormal glucose     Restless legs syndrome (RLS)     Vitamin D deficiency     Wears glasses         Past Surgical History:     Past Surgical History:   Procedure Laterality Date    ABDOMEN SURGERY  1976    benign tumor removed near remaining overy, 1.5 pounds    APPENDECTOMY  1968    appendix ruptured, developed peritonitis    BUNIONECTOMY Left     along with calcium deposits removed   [address]  2005    negative    COLECTOMY  1969    12 INCHES REMOVED D/T OBSTRUCTION    COLONOSCOPY      CYST REMOVAL Right     right facial    HYSTERECTOMY  1973    taken as a result of recurring cysts    OTHER SURGICAL HISTORY  8/14/14    FESS    OVARY REMOVAL  1970    UNILATERAL due to cyst    OVARY REMOVAL  1971    partial, due to cyst    SINUS SURGERY  2004    UPPER GASTROINTESTINAL ENDOSCOPY N/A 5/31/2019    EGD ESOPHAGOGASTRODUODENOSCOPY performed by Jaida Duffy MD at [address] Left 3/5/2019    WRIST OPEN REDUCTION INTERNAL FIXATION performed by Madelyn Carrasco MD at [address]        Medications Prior to Admission:     Prior to Admission medications    Medication Sig Start Date End Date Taking? Authorizing Provider   cholestyramine (QUESTRAN) 4 g packet Take 1 packet by mouth 2 times daily 5/24/19   Arjun Crawford MD   SITagliptin (JANUVIA) 100 MG tablet Take 1 tablet by mouth daily 5/24/19   Arjun Crawford MD   meloxicam (MOBIC) 15 MG tablet Take 1 tablet by mouth daily 5/24/19   Arjun Crawford MD   oxyCODONE-acetaminophen (PERCOCET) 5-325 MG per tablet Take 1 tablet by mouth 2 times daily as needed for Pain for up to 30 days.  5/18/19 6/17/19  MAIK Zheng - CNP   acetaminophen (TYLENOL) 500 MG tablet Take 500 mg by mouth every 6 hours as needed for Pain    Historical Provider, MD   fenofibrate micronized (LOFIBRA) 134 MG capsule Take 1 capsule by mouth every morning (before breakfast) 3/28/19   Tomy Mancuso MD   lisinopril (PRINIVIL;ZESTRIL) 40 MG tablet Take 1 tablet by mouth daily 3/28/19   Tomy Mancuso MD   citalopram (CELEXA) 10 MG tablet Take 1 tablet by mouth daily 3/28/19   Tomy Mancuso MD   spironolactone (ALDACTONE) 25 MG tablet Take 1 tablet by mouth daily 3/22/19   Tomy Mancuso MD   carvedilol (COREG) 12.5 MG tablet Take 1 tablet by mouth 2 times daily 3/22/19   Tomy Mancuso MD   gabapentin (NEURONTIN) 300 MG capsule Take 1 capsule by mouth nightly for 180 days. 3/22/19 9/18/19  Lamar Schwartz MD   cyanocobalamin 1000 MCG/ML injection Inject 1 mL into the muscle once for 1 dose 3/18/19 5/14/19  Lamar Schwartz MD   lidocaine (XYLOCAINE) 5 % ointment 4-5 times a day to your right thigh for pain. 3/15/19   Juan R Leone MD   furosemide (LASIX) 40 MG tablet Take 1 tablet by mouth daily 2/19/19   Tomy Mancuso MD   famotidine (PEPCID) 20 MG tablet Take 1 tablet by mouth 2 times daily 2/8/19   Tomy Mancuso MD   pramipexole (MIRAPEX) 1 MG tablet Take 1.5 tablets by mouth daily 2/8/19   Tomy Mancuso MD   atorvastatin (LIPITOR) 80 MG tablet Take 1 tablet by mouth nightly 2/8/19   Tomy Mancuso MD   hydrALAZINE (APRESOLINE) 100 MG tablet Take 1 tablet by mouth every 8 hours 1/22/19   Tomy Mancuso MD   nitroGLYCERIN (NITROSTAT) 0.4 MG SL tablet Place 1 tablet under the tongue every 5 minutes as needed for Chest pain 1/8/18   Tomy Mancuso MD   vitamin D (CHOLECALCIFEROL) 1000 UNIT TABS tablet Take 1 tablet by mouth daily 1/8/18   Tomy Mancuso MD   cyclobenzaprine (FLEXERIL) 10 MG tablet Take 1 tablet by mouth 3 times daily as needed for Muscle spasms 1/8/18   Tomy Mancuso MD   Calcium Carbonate-Vitamin D (CALCIUM 500/D) 500-125 MG-UNIT TABS Take 1 tablet by mouth 2 times daily     Historical Provider, MD        Allergies:     Bactrim [sulfamethoxazole-trimethoprim];  Codeine; and Seasonal    Social History:     Tobacco: reports that she quit smoking about 1 years ago. Her smoking use included cigarettes. She started smoking about 23 years ago. She has a 10.00 pack-year smoking history. She has never used smokeless tobacco.  Alcohol:      reports that she does not drink alcohol. Drug Use:  reports that she does not use drugs. Family History:     Family History   Problem Relation Age of Onset    Stroke Mother     Diabetes Mother     Heart Disease Mother     High Blood Pressure Mother     Heart Disease Father     Heart Disease Brother     High Blood Pressure Brother     Heart Disease Maternal Grandmother     High Blood Pressure Sister        Review of Systems:     Positive and Negative as described in HPI. CONSTITUTIONAL:  negative for fevers, chills, sweats, fatigue, weight loss  HEENT:  negative for vision, hearing changes, runny nose, throat pain  RESPIRATORY:  negative for shortness of breath, cough, congestion, wheezing. CARDIOVASCULAR:  negative for chest pain, palpitations.   GASTROINTESTINAL:  negative for nausea, vomiting, diarrhea, constipation, change in bowel habits, abdominal pain   GENITOURINARY:  negative for difficulty of urination, burning with urination, frequency   INTEGUMENT:  negative for rash, skin lesions, easy bruising   HEMATOLOGIC/LYMPHATIC:  negative for swelling/edema   ALLERGIC/IMMUNOLOGIC:  negative for urticaria , itching  ENDOCRINE:  negative increase in drinking, increase in urination, hot or cold intolerance  MUSCULOSKELETAL:  negative joint pains, muscle aches, swelling of joints  NEUROLOGICAL: Positive for frequent falls walks with a walker no focal neurological deficit  BEHAVIOR/PSYCH:  negative for depression, anxiety    Physical Exam:     BP (!) 164/60   Pulse 75   Temp 98.1 °F (36.7 °C) (Oral)   Resp 17   Ht 5' 4\" (1.626 m)   Wt 184 lb 11.9 oz (83.8 kg)   SpO2 98%   BMI 31.71 kg/m²   Temp (24hrs), Av °F (36.7 °C), Min:97.5 °F (36.4 °C), Max:98.3 °F (36.8 °C)    Recent Labs     05/31/19  1430   POCGLU 76       Intake/Output Summary (Last 24 hours) at 6/2/2019 1120  Last data filed at 6/2/2019 1103  Gross per 24 hour   Intake 1180 ml   Output 2200 ml   Net -1020 ml     Morbid obesity  General Appearance:  alert, well appearing, and in no acute distress  Mental status: oriented to person, place, and time with normal affect  Head:  normocephalic, atraumatic. Eye: no icterus, redness, pupils equal and reactive, extraocular eye movements intact, conjunctiva clear  Ear: normal external ear, no discharge, hearing intact  Nose:  no drainage noted  Mouth: mucous membranes moist  Neck: supple, no carotid bruits, thyroid not palpable  Lungs: Bilateral equal air entry, clear to ausculation, no wheezing, rales or rhonchi, normal effort  Cardiovascular: normal rate, regular rhythm, no murmur, gallop, rub.   Abdomen: Soft, nontender, nondistended, normal bowel sounds, no hepatomegaly or splenomegaly  Neurologic: There are no new focal motor or sensory deficits, normal muscle tone and bulk, no abnormal sensation, normal speech, cranial nerves II through XII grossly intact  Gait not checked patient walks with a walker leaning forward  Skin: No gross lesions, rashes, bruising or bleeding on exposed skin area  Extremities:  peripheral pulses palpable, no pedal edema or calf pain with palpation  Psych: normal affect    Investigations:      Laboratory Testing:  Recent Results (from the past 24 hour(s))   CBC    Collection Time: 06/02/19  4:24 AM   Result Value Ref Range    WBC 5.5 3.5 - 11.0 k/uL    RBC 3.40 (L) 4.0 - 5.2 m/uL    Hemoglobin 10.7 (L) 12.0 - 16.0 g/dL    Hematocrit 32.5 (L) 36 - 46 %    MCV 95.6 80 - 100 fL    MCH 31.5 26 - 34 pg    MCHC 32.9 31 - 37 g/dL    RDW 14.1 11.5 - 14.9 %    Platelets 507 791 - 273 k/uL    MPV 7.0 6.0 - 12.0 fL    NRBC Automated NOT REPORTED per 100 WBC   BASIC METABOLIC PANEL    Collection Time: 06/02/19  4:24 AM   Result Value Ref Range Glucose 109 (H) 70 - 99 mg/dL    BUN 20 8 - 23 mg/dL    CREATININE 0.96 (H) 0.50 - 0.90 mg/dL    Bun/Cre Ratio NOT REPORTED 9 - 20    Calcium 8.7 8.6 - 10.4 mg/dL    Sodium 141 135 - 144 mmol/L    Potassium 5.0 3.7 - 5.3 mmol/L    Chloride 110 (H) 98 - 107 mmol/L    CO2 20 20 - 31 mmol/L    Anion Gap 11 9 - 17 mmol/L    GFR Non-African American 58 (L) >60 mL/min    GFR African American >60 >60 mL/min    GFR Comment          GFR Staging NOT REPORTED    CK    Collection Time: 06/02/19  4:24 AM   Result Value Ref Range    Total  (H) 26 - 192 U/L       Imaging/Diagonstics:      Assessment :      Primary Problem  <principal problem not specified>    Active Hospital Problems    Diagnosis Date Noted    BIN (acute kidney injury) ([facility] [N17.9] 08/05/2015     Priority: High    Closed head injury [S09.90XA]     Scalp laceration [S01.01XA]     Abnormal finding on imaging [R93.89]     Other irritable bowel syndrome [K58.8]     Frequent falls [R29.6]     Double vision [H53.2]     Muscle soreness [M79.10]        Plan:   Acute renal failure likely prerenal  Hold Lasix is losartan no NSAIDs  Gentle hydration  1. Frequent falls MRI brain is nil acute recent MRI of the lower back had MRI done which shows a degenerative disc disease  2. Physical therapy occupational therapy to improve her lower oximetry strength and proper balance coordination  3. Type IIN STEMI secondary to renal failure denies any chest pain no further cardiac workup at this time  4.  May 31   mri lumbar plannoted  May need ecf for reha  Fall risk  June 2  Eliquis has been held as patient has been bleeding from the scalp and forearm fall risk  At this time is the highest to restart the full dose and accommodation will patient had a 1 history of DVT last year somewhat she has completed 6 months of and accommodation given that she is a very high risk of falls and active bleeding from a laceration I think she is a poor candidate to restart the blood

## 2019-06-02 NOTE — DISCHARGE INSTR - COC
Continuity of Care Form    Patient Name: Jose Alfredo Shipman   :  1950  MRN:  [id number]    Admit date:  2019  Discharge date:  19    Code Status Order: Full Code   Advance Directives:   Advance Care Flowsheet Documentation     Date/Time Healthcare Directive Type of Healthcare Directive Copy in [address] Agent's Name Healthcare Agent's Phone Number    19 1239  Yes, patient has an advance directive for healthcare treatment  Durable power of  for health care  Yes, copy in chart  Healthcare power of   Hafsa Bethea -   [phone]    19 [phone]  Yes, patient has an advance directive for healthcare treatment  Durable power of  for health care;Living will  No, copy requested from family  --  --  --    19 1011  Yes, patient has an advance directive for healthcare treatment  Living will;Durable power of  for health care  No, copy requested from family  Spouse  Hafsa Bethea  [phone]          Admitting Physician:  Marvis Osler, MD  PCP: Marvis Osler, MD    Discharging Nurse: [facility]/Room#: 2121/2121-01  Discharging Unit Phone Number: [phone]    Emergency Contact:   Extended Emergency Contact Information  Primary Emergency Contact: Artem Sabina  Address: [address] [place] [address] Phone: [phone]  Work Phone: [phone]  Mobile Phone: [phone]  Relation: Spouse  Hearing or visual needs: None  Other needs: None  Preferred language: English   needed?  No    Past Surgical History:  Past Surgical History:   Procedure Laterality Date    ABDOMEN SURGERY      benign tumor removed near remaining overy, 1.5 pounds    APPENDECTOMY  1968    appendix ruptured, developed peritonitis    BUNIONECTOMY Left     along with calcium deposits removed   [address]  2005    negative    COLECTOMY [facility] D/T OBSTRUCTION    COLONOSCOPY      CYST REMOVAL Right     right facial    HYSTERECTOMY  1973    taken as a result of recurring cysts    OTHER SURGICAL HISTORY  8/14/14    FESS    OVARY REMOVAL  1970    UNILATERAL due to cyst    OVARY REMOVAL  1971    partial, due to cyst    SINUS SURGERY  2004    UPPER GASTROINTESTINAL ENDOSCOPY N/A 5/31/2019    EGD ESOPHAGOGASTRODUODENOSCOPY performed by Chong Kimball MD at [address] Left 3/5/2019    WRIST OPEN REDUCTION INTERNAL FIXATION performed by Brandon Camarillo MD at [address]       Immunization History:   Immunization History   Administered Date(s) Administered    Influenza, High Dose (Fluzone 65 yrs and older) 11/17/2017    Pneumococcal 13-valent Conjugate (Vmsqqbt71) 05/23/2017    Pneumococcal Polysaccharide (Zvurrqpbb30) 05/10/2016       Active Problems:  Patient Active Problem List   Diagnosis Code    Other specified disorders of rotator cuff syndrome of shoulder and allied disorders M75.100    Diverticulosis of large intestine K57.30    Intestinal or peritoneal adhesions with obstruction (postoperative) (postinfection) ([address] K56.50    Restless legs syndrome (RLS) G25.81    GERD (gastroesophageal reflux disease) K21.9    Essential hypertension I10    Mixed hyperlipidemia E78.2    Other abnormal glucose R73.09    Atherosclerosis I70.90    Lateral epicondylitis M77.10    Allergic rhinitis J30.9    Vitamin D deficiency E55.9    Depression F32.9    Degenerative joint disease (DJD) of hip M16.9    Edema extremities R60.0    Injury of foot, left Q84.925X    Facial droop R29.810    CVA (cerebral vascular accident) ([address] I63.9    BIN (acute kidney injury) ([address] N17.9    Bradycardia R00.1    Ataxia R27.0    Aphasia R47.01    TIA (transient ischemic attack) G45.9    Need for prophylactic vaccination and inoculation against cholera alone Z23    Chest pain R07.9    Osteopenia M85.80    Lumbago M54.5    Facet arthritis of lumbar region ([facility] [address] M46.96    Meralgia paresthetica G57.10    Lumbar degenerative disc disease M51.36    Spondylosis of lumbar region without myelopathy or radiculopathy M47.816    Blood poisoning [id number]    Cellulitis of left lower extremity L03. 80    Encounter for medication monitoring Z51.81    Deep vein thrombosis (DVT) of right lower extremity (HCC) I82.401    Lumbar facet arthropathy M47.816    Shortness of breath R06.02    Chronic deep vein thrombosis (DVT) of proximal vein of both lower extremities (HCC) I82.5Y3    Chronic diastolic heart failure ([facility]) I50.32    Neurogenic claudication due to lumbar spinal stenosis M48.062    Sacroiliitis (HCC) M46.1    Stage 3 chronic kidney disease (HCC) N18.3    Stage 3 chronic kidney disease (HCC) N18.3    Type 2 diabetes mellitus with circulatory disorder, without long-term current use of insulin (HCC) E11.59    Chronic deep vein thrombosis (DVT) of popliteal vein of right lower extremity (HCC) I82.531    Closed fracture of left wrist S62.102A    B12 deficiency E53.8    Iron deficiency anemia secondary to inadequate dietary iron intake D50.8    Diarrhea due to malabsorption K90.9, R19.7    Closed head injury S09.90XA    Scalp laceration S01. 01XA    Abnormal finding on imaging R93.89    Other irritable bowel syndrome K58.8    Frequent falls R29.6    Double vision H53.2    Muscle soreness M79.10       Isolation/Infection:   Isolation          No Isolation            Nurse Assessment:  Last Vital Signs: BP (!) 164/60   Pulse 75   Temp 98.1 °F (36.7 °C) (Oral)   Resp 17   Ht 5' 4\" (1.626 m)   Wt 184 lb 11.9 oz (83.8 kg)   SpO2 98%   BMI 31.71 kg/m²     Last documented pain score (0-10 scale): Pain Level: 0  Last Weight:   Wt Readings from Last 1 Encounters:   06/02/19 184 lb 11.9 oz (83.8 kg)     Mental Status:  oriented and alert    IV Access:  - None    Nursing Mobility/ADLs:  Walking   Assisted  Transfer Assisted  Bathing  Assisted  Dressing  Assisted  Toileting  Assisted  Feeding  Independent  Med Admin  Assisted  Med Delivery   whole    Wound Care Documentation and Therapy:  Wound 05/30/19 Head Left;Upper (Active)   Wound Traumatic 6/2/2019  9:00 AM   Dressing Status Clean;Dry; Intact 6/2/2019  9:20 AM   Dressing Changed Other (Comment) 6/2/2019  9:20 AM   Dressing/Treatment Non adherent 6/2/2019  9:20 AM   Wound Assessment Edges well approximated 6/2/2019  9:20 AM   Drainage Amount None 6/2/2019  9:20 AM   Drainage Description Sanguinous 6/2/2019  1:00 AM   Odor None 6/2/2019  9:20 AM   Number of days: 2        Safety Concerns:     History of Falls (last 30 days) and At Risk for Falls    Impairments/Disabilities:      None    Nutrition Therapy:  Current Nutrition Therapy:   - Oral Diet:  General    Routes of Feeding: Oral  Liquids: No Restrictions  Daily Fluid Restriction: no  Last Modified Barium Swallow with Video (Video Swallowing Test): not done    Treatments at the Time of Hospital Discharge:   Respiratory Treatments: n/a  Oxygen Therapy:  is not on home oxygen therapy. Ventilator:    - No ventilator support    Rehab Therapies: Physical Therapy and Occupational Therapy  Weight Bearing Status/Restrictions: No weight bearing restirctions  Other Medical Equipment (for information only, NOT a DME order):     Other Treatments: skilled nursing assessment, medication education and monitoring    Patient's personal belongings (please select all that are sent with patient):  None    RN SIGNATURE:  Electronically signed by Preeti Olsen RN on 6/2/19 at 11:51 AM    CASE MANAGEMENT/SOCIAL WORK SECTION    Inpatient Status Date: 5/30/19    Readmission Risk Assessment Score:  Readmission Risk              Risk of Unplanned Readmission:        13           Discharging to Facility/ [address]  Phone: [phone]  · Fax [phone]  ·     / signature: Electronically signed by Erin Barajas RN on 6/2/19 at 11:29 AM    PHYSICIAN SECTION    Prognosis: Fair    Condition at Discharge: Stable    Rehab Potential (if transferring to Rehab): Good    Recommended Labs or Other Treatments After Discharge: see above    Physician Certification: I certify the above information and transfer of Elvia Barnes  is necessary for the continuing treatment of the diagnosis listed and that she requires Home Care for less 30 days.      Update Admission H&P: No change in H&P    PHYSICIAN SIGNATURE:  Verbal order Dr. Marcus Reese Electronically signed by Erin Barajas RN on 6/2/19 at 11:51 AM

## 2019-06-02 NOTE — PLAN OF CARE
Problem: Falls - Risk of:  Goal: Will remain free from falls  Description  Will remain free from falls  6/2/2019 0515 by Deirdre Zuñiga RN  Outcome: Ongoing   Pt assessed as a fall risk this shift. Remains free from falls and accidental injury at this time. Fall precautions in place, including falling star sign and fall risk band on pt. Floor free from obstacles, and bed is locked and in lowest position. Adequate lighting provided. Pt encouraged to call before getting OOB for any need. Bed alarm activated. Will continue to monitor needs during hourly rounding, and reinforce education on use of call light.     Problem: Tissue Perfusion - Renal, Altered:  Goal: Ability to achieve a balanced intake and output will improve  Description  Ability to achieve a balanced intake and output will improve  Outcome: Ongoing     Problem: Pain:  Goal: Pain level will decrease  Description  Pain level will decrease  6/2/2019 0515 by Deirdre Zuñiga RN  Outcome: Ongoing pt receiving pain meds prn

## 2019-06-02 NOTE — CARE COORDINATION
[facility] [address] Encounter Date/Time: 2019 [facility] Account: [de-identified]    MRN: [id number]    Patient:  Laurita Russo   Contact Serial #: [id number]            ENCOUNTER          Patient Class: I Private Enc? No Unit RM BD: [facility] PROG    Hospital Service: Intermediate   ADM DX: BIN (acute kidney injury*   ADM Provider: Angela Mauricio MD   Procedure:     ATT Provider: Hansel Cevallos MD   REF Provider:        PATIENT                 Name: Laurita Russo : 1950 (77 yrs)   Address: [address] [place] [address] 2 Sex: Female   City: [facility] [address]         Marital Status:    Employer: [employer]         Faith: Yarsani   Primary Care Provider: Angela Mauricio MD         Primary Phone: [phone]   EMERGENCY CONTACT   Contact Name Legal Guardian? Relationship to Patient Home Phone Work Phone   1. [employer] [place]  2. *No Contact Specified* No    Spouse    [phone]            GUARANTOR            Guarantor: Laurita Russo     : 1950   Address: [address] [place] [address] [address] Sex: Female     [place] [address]     Relation to Patient: Self       Home Phone: [phone]   Guarantor ID: [id number]       Work Phone:     Guarantor Employer: [employer]         Status: RETIRED      COVERAGE        PRIMARY INSURANCE   Payor: MEDICARE Plan: MEDICARE PART A AND B   Payor Address: [facility] [address],  [facility], [facility] [address]       Group Number:   Insurance Type: INDEMNITY   Subscriber Name: James Jones : 1950   Subscriber ID: [id number] Pat. Rel. to Sub: Self   SECONDARY INSURANCE   Payor: [facility] Plan: [facility]   Payor Address:  [phone], [place], [place] [address]          Group Number:   Insurance Type: INDEMNITY   Subscriber Name: James Jones : 1950   Subscriber ID: [id number] Pat.  Rel. to Sub: SELF

## 2019-06-02 NOTE — PROGRESS NOTES
Used sterile water to wash patient's head laceration to visualize staples. Will continue to monitor.

## 2019-06-02 NOTE — PROGRESS NOTES
NEPHROLOGY CONSULT     Patient :  Rich Stone; 77 y.o. MRN# [id number]  Location:  2121/2121-01  Attending:  Teto Dotson MD  Admit Date:  5/30/2019   Hospital Day: 3      Reason for Consult: Acute kidney injury      Subjective:  Patient has no new complaints and denies SOB , flank  pain or fever. Had MRI LS showing significant degenerative disease. Scr significantly improved and rhabdomyolysis resolved. Satisfactory urine output. Blood Pressures do remain elevated    History of Present Illness: This is a 77 y.o. female the past medical history of essential hypertension, CHF with preserved EF mild LVH diastolic dysfunction, history of type 2 diabetes, CK D stage III with baseline creatinine of 0.8-1.2 mg/dL, patient presented to the hospital with a fall, she fell to the ground and hit her head on the ground patient did not feel any prior warning for dizziness and lightheadedness was no loss of consciousness, denied any palpitation or shortness of breath or chest pain. Patient has fallen multiple times in the last 6 months. Patient is on multiple blood pressure medications and diuretics. On initial investigation lab investigation showed acute renal failure with creatinine peaked to 3.5 mg/dL her for nephrology consultation has been requested  Medication review shows use of ACE-inhibitor and diuretics.     Current Medications:      lisinopril (PRINIVIL;ZESTRIL) tablet 10 mg Daily   famotidine (PEPCID) tablet 20 mg BID   amLODIPine (NORVASC) tablet 5 mg Daily   pramipexole (MIRAPEX) tablet 0.75 mg Nightly   hydrALAZINE (APRESOLINE) injection 5 mg Q6H PRN   Tetanus-Diphth-Acell Pertussis (BOOSTRIX) injection 0.5 mL Once   sodium chloride flush 0.9 % injection 10 mL 2 times per day   sodium chloride flush 0.9 % injection 10 mL PRN   magnesium hydroxide (MILK OF MAGNESIA) 400 MG/5ML suspension 30 mL Daily PRN   ondansetron (ZOFRAN) injection 4 mg Q6H PRN   carvedilol (COREG) tablet 12.5 mg BID   cholestyramine light packet 4 g BID   citalopram (CELEXA) tablet 10 mg Daily   gabapentin (NEURONTIN) capsule 300 mg Nightly   hydrALAZINE (APRESOLINE) tablet 100 mg 3 times per day   nitroGLYCERIN (NITROSTAT) SL tablet 0.4 mg Q5 Min PRN   oxyCODONE-acetaminophen (PERCOCET) 5-325 MG per tablet 1 tablet Q8H PRN   linagliptin (TRADJENTA) tablet 5 mg Daily   heparin (porcine) injection 5,000 Units BID       Allergies:  Bactrim [sulfamethoxazole-trimethoprim]; Codeine; and Seasonal      Objective:  CURRENT TEMPERATURE:  Temp: 98.2 °F (36.8 °C)  MAXIMUM TEMPERATURE OVER 24HRS:  Temp (24hrs), Av °F (36.7 °C), Min:97.5 °F (36.4 °C), Max:98.2 °F (36.8 °C)    CURRENT RESPIRATORY RATE:  Resp: 14  CURRENT PULSE:  Pulse: 91  CURRENT BLOOD PRESSURE:  BP: (!) 161/69  24HR BLOOD PRESSURE RANGE:  Systolic (07PUN), UHC:939 , Min:153 , NBZ:508   ; Diastolic (98MFO), MAYRA:40, Min:59, Max:105    24HR INTAKE/OUTPUT:      Intake/Output Summary (Last 24 hours) at 2019 1625  Last data filed at 2019 1103  Gross per 24 hour   Intake 1030 ml   Output 2200 ml   Net -1170 ml     Patient Vitals for the past 96 hrs (Last 3 readings):   Weight   19 0445 184 lb 11.9 oz (83.8 kg)   19 0648 180 lb (81.6 kg)       Physical Exam:  GENERAL APPEARANCE: Alert and cooperative, and appears to be in no acute distress. HEAD: normocephalic  EYES: PERRL, EOMI. Not pale, anicteric   NOSE:  No nasal discharge. THROAT:  Oral cavity and pharynx normal. Moist  CARDIAC: Normal S1 and S2. No S3, S4 or murmurs. Rhythm is regular. LUNGS: Clear to auscultation and percussion without rales, rhonchi, wheezing or diminished breath sounds. NECK: Neck supple, non-tender without lymphadenopathy, masses or thyromegaly. JVD-neg  BACK: Examination of the spine reveals normal gait and posture, no spinal deformity, symmetry of spinal muscles, without tenderness, decreased range of motion or muscular spasm  MUSKULOSKELETAL: Adequately aligned spine.  No joint erythema or tenderness. EXTREMITIES: Trace edema. Peripheral pulses intact. NEURO: Nonfocal      Labs:   CBC:  Recent Labs     05/31/19 0451 06/01/19 0447 06/02/19 0424   WBC 7.1 4.6 5.5   RBC 3.31* 3.25* 3.40*   HGB 10.3* 10.1* 10.7*   HCT 31.8* 31.1* 32.5*   MCV 96.2 95.9 95.6   MCH 31.3 31.0 31.5   MCHC 32.5 32.3 32.9   RDW 14.3 14.3 14.1    287 305   MPV 8.1 8.0 7.0      BMP:   Recent Labs     05/31/19 0451 06/01/19 0447 06/02/19 0424    143 141   K 4.9 4.9 5.0   * 113* 110*   CO2 20 21 20   BUN 43* 26* 20   CREATININE 1.28* 1.03* 0.96*   GLUCOSE 101* 111* 109*   CALCIUM 7.8* 7.9* 8.7        Urine Sodium:    No results for input(s): RAISA in the last 72 hours. Urine Creatinine:    No results for input(s): LABCREA in the last 72 hours. Urine Eosinophils: Invalid input(s): EOSU  Urine Protein:  No results for input(s): TPU in the last 72 hours. Urinalysis:    No results for input(s): Zaina Heller, PHUR, LABCAST, WBCUA, RBCUA, MUCUS, TRICHOMONAS, YEAST, BACTERIA, CLARITYU, SPECGRAV, LEUKOCYTESUR, UROBILINOGEN, BILIRUBINUR, BLOODU, GLUCOSEU, KETUA, AMORPHOUS in the last 72 hours. Radiology:  Reviewed as available. Assessment:  1. BIN on CKD, oliguric, likely causes includes prerenal azotemia vs ATN due to hypovolemia, use of diuretics, NSAIDs, ACE inhibitor's-and resolved        UA unremarkable no protein no blood. Renal ultrasound unremarkable no hydronephrosis      2. CKD stage 3 2nd to nephrosclerosis baseline serum creatinine- 0.8 to  1.2 mg/dL . 3.  Fall, orthostatics negative-possibly related to spinal degenerative disease. disease. CT head no acute intracranial abnormality. #4 type 2 diabetes    5. CHF with preserved EF and LVH with  diastolic dysfunction  Currently not in fluid overload, proBNP 173      6. Hypertension-uncontrolled -restart lisinopril 10 mg daily and Lasix 40 mg daily.       Follow-up 4 weeks      Electronically signed by Matt Velazquez MD 6/2/2019 at 4:25 PM

## 2019-06-02 NOTE — CARE COORDINATION
Faxed facesheet, ANNALEE, and d/c med list to [address] [place] [address] with note attached stating that pt will be discharged home today.     Electronically signed by Mae Villagomez RN on 6/2/2019 at 11:53 AM

## 2019-06-03 ENCOUNTER — CARE COORDINATION (OUTPATIENT)
Dept: CASE MANAGEMENT | Age: 69
End: 2019-06-03

## 2019-06-03 DIAGNOSIS — N17.9 AKI (ACUTE KIDNEY INJURY) (HCC): Primary | ICD-10-CM

## 2019-06-03 LAB — LYME ANTIBODY: 0.27

## 2019-06-03 PROCEDURE — 1111F DSCHRG MED/CURRENT MED MERGE: CPT

## 2019-06-04 NOTE — CARE COORDINATION
[facility] Transitions Initial Follow Up Call    Call within 2 business days of discharge: Yes    Patient: Dawson De Dios Patient : 1950   MRN: [id number]  Reason for Admission: fall  Discharge Date: 19 RARS: Readmission Risk Score: 13      Last Discharge [facility]       Complaint Diagnosis Description Type Department Provider    19 Fall; Head Laceration Closed head injury, initial encounter . .. ED to Hosp-Admission (Discharged) (ADMITTED) Kimmy Schwartz MD; Linnie Primrose. ..            # 1 attempt- unable to reach patient, left vm message with name and call back number,requested call back//ANASTASIA    Facility: [facility]    Non-face-to-face services provided:  Communication with home health agencies or other community services the patient is currently using-writer contacted OL spoke to Eleanor Mai, confirmed referral was received//ANASTASIA    Care Transitions 24 Hour Call    Do you have all of your prescriptions and are they filled?:  Yes  Patient DME:  Shelly Parkinson, Other  Other Patient DME:  trevor sin  Do you have support at home?:  Partner/Spouse/SO  Are you an active caregiver in your home?:  No  Care Transitions Interventions         Follow Up  Future Appointments   Date Time Provider Britni Marshall   2019  9:00 AM MAIK Nunez - CNP [facility] None   2019  4:00 PM Lena Concepcion MD [address]   2019  9:40 AM Nemo Montalvo MD [address] [place] [address]       Jaylen Melendez RN
Services:  Home Health (Comment: [facility])  Patient DME:  Sara Alcaraz, Other  Other Patient DME:  [employer]  Do you have support at home?:  Partner/Spouse/SO  Do you feel like you have everything you need to keep you well at home?:  Yes  Are you an active caregiver in your home?:  No  Care Transitions Interventions         Follow Up  Future Appointments   Date Time Provider Britni Marshall   6/11/2019  9:00 AM MAIK Gongora - CNP [facility] None   6/27/2019  4:00 PM Andres Miranda MD [facility]   8/16/2019  9:40 AM David Jennings MD [facility]       Panfilo Ambrose RN

## 2019-06-05 NOTE — TELEPHONE ENCOUNTER
[address] called & stated pt's discharge instructions state that pt should be taking both doses of lisinopril 40 mg and also  10 mg daily. Pt states she has only been taking 10 mg daily. Please advise which dose pt should be taking.

## 2019-06-11 ENCOUNTER — OFFICE VISIT (OUTPATIENT)
Dept: INTERNAL MEDICINE CLINIC | Age: 69
End: 2019-06-11
Payer: MEDICARE

## 2019-06-11 ENCOUNTER — HOSPITAL ENCOUNTER (OUTPATIENT)
Age: 69
Setting detail: SPECIMEN
Discharge: HOME | End: 2019-06-11
Payer: MEDICARE

## 2019-06-11 ENCOUNTER — HOSPITAL ENCOUNTER (OUTPATIENT)
Dept: PAIN MANAGEMENT | Age: 69
Discharge: HOME | End: 2019-06-11
Payer: MEDICARE

## 2019-06-11 VITALS
HEART RATE: 72 BPM | WEIGHT: 179 LBS | OXYGEN SATURATION: 95 % | BODY MASS INDEX: 31.71 KG/M2 | DIASTOLIC BLOOD PRESSURE: 68 MMHG | RESPIRATION RATE: 16 BRPM | TEMPERATURE: 98.2 F | SYSTOLIC BLOOD PRESSURE: 113 MMHG | HEIGHT: 63 IN

## 2019-06-11 VITALS
HEIGHT: 63 IN | RESPIRATION RATE: 16 BRPM | WEIGHT: 178 LBS | SYSTOLIC BLOOD PRESSURE: 124 MMHG | DIASTOLIC BLOOD PRESSURE: 67 MMHG | BODY MASS INDEX: 31.54 KG/M2

## 2019-06-11 DIAGNOSIS — D50.8 IRON DEFICIENCY ANEMIA SECONDARY TO INADEQUATE DIETARY IRON INTAKE: ICD-10-CM

## 2019-06-11 DIAGNOSIS — R29.6 FREQUENT FALLS: ICD-10-CM

## 2019-06-11 DIAGNOSIS — M47.816 FACET ARTHRITIS OF LUMBAR REGION: ICD-10-CM

## 2019-06-11 DIAGNOSIS — M47.816 LUMBAR FACET ARTHROPATHY: ICD-10-CM

## 2019-06-11 DIAGNOSIS — M47.816 SPONDYLOSIS OF LUMBAR REGION WITHOUT MYELOPATHY OR RADICULOPATHY: ICD-10-CM

## 2019-06-11 DIAGNOSIS — M16.0 PRIMARY OSTEOARTHRITIS OF BOTH HIPS: ICD-10-CM

## 2019-06-11 DIAGNOSIS — R73.03 PREDIABETES: ICD-10-CM

## 2019-06-11 DIAGNOSIS — M46.1 SACROILIITIS (HCC): ICD-10-CM

## 2019-06-11 DIAGNOSIS — I10 HYPERTENSION, UNSPECIFIED TYPE: ICD-10-CM

## 2019-06-11 DIAGNOSIS — M51.36 DDD (DEGENERATIVE DISC DISEASE), LUMBAR: ICD-10-CM

## 2019-06-11 DIAGNOSIS — I25.2 HISTORY OF MI (MYOCARDIAL INFARCTION): ICD-10-CM

## 2019-06-11 DIAGNOSIS — E11.59 TYPE 2 DIABETES MELLITUS WITH OTHER CIRCULATORY COMPLICATION, WITHOUT LONG-TERM CURRENT USE OF INSULIN (HCC): ICD-10-CM

## 2019-06-11 DIAGNOSIS — M48.062 NEUROGENIC CLAUDICATION DUE TO LUMBAR SPINAL STENOSIS: ICD-10-CM

## 2019-06-11 DIAGNOSIS — G57.10 MERALGIA PARESTHETICA, UNSPECIFIED LATERALITY: ICD-10-CM

## 2019-06-11 DIAGNOSIS — M51.36 LUMBAR DEGENERATIVE DISC DISEASE: ICD-10-CM

## 2019-06-11 DIAGNOSIS — N17.9 AKI (ACUTE KIDNEY INJURY) (HCC): Primary | ICD-10-CM

## 2019-06-11 DIAGNOSIS — Z51.81 MEDICATION MONITORING ENCOUNTER: ICD-10-CM

## 2019-06-11 DIAGNOSIS — H53.9 VISION CHANGES: ICD-10-CM

## 2019-06-11 DIAGNOSIS — Z51.81 ENCOUNTER FOR MEDICATION MONITORING: ICD-10-CM

## 2019-06-11 DIAGNOSIS — H61.23 BILATERAL IMPACTED CERUMEN: ICD-10-CM

## 2019-06-11 DIAGNOSIS — S01.01XD LACERATION OF SCALP, SUBSEQUENT ENCOUNTER: ICD-10-CM

## 2019-06-11 DIAGNOSIS — G57.11 MERALGIA PARESTHETICA OF RIGHT SIDE: Primary | ICD-10-CM

## 2019-06-11 DIAGNOSIS — M16.11 PRIMARY OSTEOARTHRITIS OF RIGHT HIP: ICD-10-CM

## 2019-06-11 LAB
IRON SATURATION: 28 % (ref 20–55)
IRON: 119 UG/DL (ref 37–145)
TOTAL IRON BINDING CAPACITY: 431 UG/DL (ref 250–450)
UNSATURATED IRON BINDING CAPACITY: 312 UG/DL (ref 112–347)

## 2019-06-11 PROCEDURE — 99213 OFFICE O/P EST LOW 20 MIN: CPT | Performed by: NURSE PRACTITIONER

## 2019-06-11 PROCEDURE — 1111F DSCHRG MED/CURRENT MED MERGE: CPT | Performed by: NURSE PRACTITIONER

## 2019-06-11 PROCEDURE — 99495 TRANSJ CARE MGMT MOD F2F 14D: CPT | Performed by: NURSE PRACTITIONER

## 2019-06-11 PROCEDURE — 99213 OFFICE O/P EST LOW 20 MIN: CPT

## 2019-06-11 RX ORDER — BLOOD-GLUCOSE METER
1 KIT MISCELLANEOUS DAILY
Qty: 1 KIT | Refills: 0 | Status: SHIPPED | OUTPATIENT
Start: 2019-06-11 | End: 2019-10-10

## 2019-06-11 RX ORDER — ASPIRIN 81 MG/1
81 TABLET ORAL DAILY
Qty: 30 TABLET | Refills: 5 | Status: ON HOLD | COMMUNITY
Start: 2019-06-11 | End: 2019-08-07 | Stop reason: HOSPADM

## 2019-06-11 RX ORDER — OXYCODONE HYDROCHLORIDE AND ACETAMINOPHEN 5; 325 MG/1; MG/1
1 TABLET ORAL 2 TIMES DAILY PRN
Qty: 60 TABLET | Refills: 0 | Status: SHIPPED | OUTPATIENT
Start: 2019-06-19 | End: 2019-07-17 | Stop reason: SDUPTHER

## 2019-06-11 ASSESSMENT — ENCOUNTER SYMPTOMS
RESPIRATORY NEGATIVE: 1
TROUBLE SWALLOWING: 0
BACK PAIN: 1
WHEEZING: 0
EYE REDNESS: 0
SHORTNESS OF BREATH: 1
SORE THROAT: 0
CONSTIPATION: 0
COUGH: 0
ABDOMINAL PAIN: 0
GASTROINTESTINAL NEGATIVE: 1

## 2019-06-11 NOTE — PROGRESS NOTES
Chronic Disease Visit Information    BP Readings from Last 3 Encounters:   06/11/19 113/68   06/11/19 124/67   06/02/19 (!) 161/69          Hemoglobin A1C (%)   Date Value   05/20/2019 5.7   08/21/2018 6.3   07/18/2016 5.7     Microalb/Crt. Ratio (mcg/mg creat)   Date Value   08/05/2015 11     LDL Cholesterol (mg/dL)   Date Value   05/20/2019 42     HDL (mg/dL)   Date Value   05/20/2019 48     BUN (mg/dL)   Date Value   06/02/2019 20     CREATININE (mg/dL)   Date Value   06/02/2019 0.96 (H)     Glucose (mg/dL)   Date Value   06/02/2019 109 (H)            Have you changed or started any medications since your last visit including any over-the-counter medicines, vitamins, or herbal medicines? yes - Januvia   Are you having any side effects from any of your medications? -  no  Have you stopped taking any of your medications? Is so, why? -  yes - Metformin    Have you seen any other physician or provider since your last visit? Yes - Records Obtained  Have you had any other diagnostic tests since your last visit? Yes - Records Obtained  Have you been seen in the emergency room and/or had an admission to a hospital since we last saw you? Yes - Records Obtained  Have you had your annual diabetic retinal (eye) exam? No  Have you had your routine dental cleaning in the past 6 months? no    Have you activated your [facility] account? If not, what are your barriers?  Yes     Patient Care Team:  Derick Sahni MD as PCP - Claire Schwartz MD as PCP - [facility]  Kathy Olivier RN as Care Transition         Medical History Review  Past Medical, Family, and Social History reviewed and does contribute to the patient presenting condition    Health Maintenance   Topic Date Due    Diabetic foot exam  01/18/1961    Diabetic retinal exam  01/18/1961    DTaP/Tdap/Td vaccine (1 - Tdap) 01/18/1970    Shingles Vaccine (1 of 2) 01/18/2001    Breast cancer screen  08/04/2019    Flu vaccine (Season Ended) 09/01/2019    A1C test (Diabetic or Prediabetic)  05/20/2020    Lipid screen  05/20/2020    Potassium monitoring  06/02/2020    Creatinine monitoring  06/02/2020    Colon cancer screen colonoscopy  10/07/2026    DEXA (modify frequency per FRAX score)  Completed    Pneumococcal 65+ years Vaccine  Completed    Hepatitis C screen  Completed      Post-Discharge Transitional Care Management Services or Hospital Follow Up      Esdras Loristalin   YOB: 1950    Date of Office Visit:  6/11/2019  Date of Hospital Admission: 5/30/19  Date of Hospital Discharge: 6/2/19  Readmission Risk Score(high >=14%.  Medium >=10%):Readmission Risk Score: 13      Care management risk score Rising risk (score 2-5) and Complex Care (Scores >=6): 4     Non face to face  following discharge, date last encounter closed (first attempt may have been earlier): 6/4/2019  1:14 PM 6/4/2019  1:14 PM    Call initiated 2 business days of discharge: Yes     Patient Active Problem List   Diagnosis    Other specified disorders of rotator cuff syndrome of shoulder and allied disorders    Diverticulosis of large intestine    Intestinal or peritoneal adhesions with obstruction (postoperative) (postinfection) ([address]    Restless legs syndrome (RLS)    GERD (gastroesophageal reflux disease)    Essential hypertension    Mixed hyperlipidemia    Other abnormal glucose    Atherosclerosis    Lateral epicondylitis    Allergic rhinitis    Vitamin D deficiency    Depression    Degenerative joint disease (DJD) of hip    Edema extremities    Injury of foot, left    Facial droop    CVA (cerebral vascular accident) ([address]    BIN (acute kidney injury) ([address]    Bradycardia    Ataxia    Aphasia    TIA (transient ischemic attack)    Need for prophylactic vaccination and inoculation against cholera alone    Chest pain    Osteopenia    Lumbago    Facet arthritis of lumbar region    Meralgia paresthetica    DDD (degenerative disc disease), lumbar times daily             cholestyramine (QUESTRAN) 4 g packet  Take 1 packet by mouth 2 times daily             citalopram (CELEXA) 10 MG tablet  Take 1 tablet by mouth daily             cyanocobalamin 1000 MCG/ML injection  Inject 1 mL into the muscle once for 1 dose             cyclobenzaprine (FLEXERIL) 10 MG tablet  Take 1 tablet by mouth 3 times daily as needed for Muscle spasms             famotidine (PEPCID) 20 MG tablet  Take 1 tablet by mouth 2 times daily             fenofibrate micronized (LOFIBRA) 134 MG capsule  Take 1 capsule by mouth every morning (before breakfast)             furosemide (LASIX) 40 MG tablet  Take 1 tablet by mouth daily             gabapentin (NEURONTIN) 300 MG capsule  Take 1 capsule by mouth nightly for 180 days. glucose monitoring kit (FREESTYLE) monitoring kit  1 kit by Does not apply route daily             hydrALAZINE (APRESOLINE) 100 MG tablet  Take 1 tablet by mouth every 8 hours             lidocaine (XYLOCAINE) 5 % ointment  4-5 times a day to your right thigh for pain. lisinopril (PRINIVIL;ZESTRIL) 10 MG tablet  Take 1 tablet by mouth daily             nitroGLYCERIN (NITROSTAT) 0.4 MG SL tablet  Place 1 tablet under the tongue every 5 minutes as needed for Chest pain             oxyCODONE-acetaminophen (PERCOCET) 5-325 MG per tablet  Take 1 tablet by mouth 2 times daily as needed for Pain for up to 30 days.              pramipexole (MIRAPEX) 1 MG tablet  Take 1.5 tablets by mouth daily             SITagliptin (JANUVIA) 100 MG tablet  Take 1 tablet by mouth daily             spironolactone (ALDACTONE) 25 MG tablet  Take 1 tablet by mouth daily             vitamin D (CHOLECALCIFEROL) 1000 UNIT TABS tablet  Take 1 tablet by mouth daily                   Medications marked \"taking\" at this time  Outpatient Medications Marked as Taking for the 6/11/19 encounter (Office Visit) with MAIK Khna - CNP   Medication Sig Dispense Refill    [START ON 6/19/2019] oxyCODONE-acetaminophen (PERCOCET) 5-325 MG per tablet Take 1 tablet by mouth 2 times daily as needed for Pain for up to 30 days. 60 tablet 0    aspirin EC 81 MG EC tablet Take 1 tablet by mouth daily 30 tablet 5    glucose monitoring kit (FREESTYLE) monitoring kit 1 kit by Does not apply route daily 1 kit 0    amLODIPine (NORVASC) 10 MG tablet Take 1 tablet by mouth daily 30 tablet 1    lisinopril (PRINIVIL;ZESTRIL) 10 MG tablet Take 1 tablet by mouth daily 30 tablet 3    cholestyramine (QUESTRAN) 4 g packet Take 1 packet by mouth 2 times daily 90 packet 3    SITagliptin (JANUVIA) 100 MG tablet Take 1 tablet by mouth daily 90 tablet 3    acetaminophen (TYLENOL) 500 MG tablet Take 500 mg by mouth every 6 hours as needed for Pain      fenofibrate micronized (LOFIBRA) 134 MG capsule Take 1 capsule by mouth every morning (before breakfast) 90 capsule 3    citalopram (CELEXA) 10 MG tablet Take 1 tablet by mouth daily 90 tablet 3    spironolactone (ALDACTONE) 25 MG tablet Take 1 tablet by mouth daily 90 tablet 3    carvedilol (COREG) 12.5 MG tablet Take 1 tablet by mouth 2 times daily 180 tablet 3    gabapentin (NEURONTIN) 300 MG capsule Take 1 capsule by mouth nightly for 180 days. 90 capsule 1    cyanocobalamin 1000 MCG/ML injection Inject 1 mL into the muscle once for 1 dose 1 mL 0    lidocaine (XYLOCAINE) 5 % ointment 4-5 times a day to your right thigh for pain.  240 g 3    furosemide (LASIX) 40 MG tablet Take 1 tablet by mouth daily (Patient taking differently: Take 20 mg by mouth daily ) 90 tablet 2    famotidine (PEPCID) 20 MG tablet Take 1 tablet by mouth 2 times daily 180 tablet 3    pramipexole (MIRAPEX) 1 MG tablet Take 1.5 tablets by mouth daily 145 tablet 3    atorvastatin (LIPITOR) 80 MG tablet Take 1 tablet by mouth nightly 90 tablet 3    hydrALAZINE (APRESOLINE) 100 MG tablet Take 1 tablet by mouth every 8 hours 90 tablet 3    nitroGLYCERIN (NITROSTAT) 0.4 MG SL tablet Place 1 tablet under the tongue every 5 minutes as needed for Chest pain 75 tablet 3    vitamin D (CHOLECALCIFEROL) 1000 UNIT TABS tablet Take 1 tablet by mouth daily 90 tablet 3    cyclobenzaprine (FLEXERIL) 10 MG tablet Take 1 tablet by mouth 3 times daily as needed for Muscle spasms 270 tablet 3    Calcium Carbonate-Vitamin D (CALCIUM 500/D) 500-125 MG-UNIT TABS Take 1 tablet by mouth 2 times daily           Medications patient taking as of now reconciled against medications ordered at time of hospital discharge: Yes    Chief Complaint   Patient presents with    Follow-Up from [address] [facility] for head injury due to a fall.  Hip Pain     Knot on right hip that is causing pain.  Health Maintenance     Patient declines at this time    Hypertension       HPI   Patient is seen today for follow up from hospital stay for head injury/scalp laceration and BIN. She got her staples out today, and reports that she fell in the parking lot of the hospital after getting that done. She did not hit her head again, did not get dizzy, states she tripped. She states she has had blurred vision for some time and is going to make appt with her eye doctor. She has a walker but is not using it. She is receiving PT and [place] [id number] nursing. She was seen by neurology, nephrology, GI during her stay and notes were reviewed. She had imaging done of her head, neck, lumbar spine, also renal US, and ended up having EGD due to dilated esophagus with food debris. She was on [address] due to hx of DVT, and this was stopped. She has history of MI, extensive PMH including prediabetes, CAD, HTN. Inpatient course: Discharge summary reviewed- see chart. Interval history/Current status: Improving    Review of Systems   Constitutional: Positive for fatigue. Negative for chills and fever. HENT: Negative for ear discharge, ear pain, postnasal drip, sore throat and trouble swallowing. Eyes: Positive for visual disturbance. Negative for redness. Respiratory: Positive for shortness of breath (occasional, with exertion). Negative for cough and wheezing. Cardiovascular: Negative for chest pain, palpitations and leg swelling. Gastrointestinal: Negative for abdominal pain and constipation. Musculoskeletal: Positive for arthralgias and gait problem. Skin: Positive for color change and wound. Neurological: Negative for dizziness, weakness and headaches. Psychiatric/Behavioral: The patient is not nervous/anxious. Vitals:    06/11/19 1520   BP: 124/67   Site: Left Upper Arm   Position: Sitting   Cuff Size: Large Adult   Resp: 16   Weight: 178 lb (80.7 kg)   Height: 5' 2.99\" (1.6 m)     Body mass index is 31.54 kg/m². Wt Readings from Last 3 Encounters:   06/11/19 179 lb (81.2 kg)   06/11/19 178 lb (80.7 kg)   06/02/19 184 lb 11.9 oz (83.8 kg)     BP Readings from Last 3 Encounters:   06/11/19 113/68   06/11/19 124/67   06/02/19 (!) 161/69       Physical Exam   Constitutional: She is oriented to person, place, and time. She appears well-developed and well-nourished. No distress. HENT:   Head: Normocephalic. Right Ear: External ear normal. No tenderness. Left Ear: External ear normal. No tenderness. Nose: Nose normal.   Mouth/Throat: Oropharynx is clear and moist.   holly cerumen impaction   Eyes: Pupils are equal, round, and reactive to light. Conjunctivae and EOM are normal. Right eye exhibits no discharge. Left eye exhibits no discharge. Neck: Normal range of motion. Cardiovascular: Normal rate and regular rhythm. No murmur heard. Pulmonary/Chest: Effort normal and breath sounds normal. She has no wheezes. Abdominal: Soft. Bowel sounds are normal. There is no tenderness. Musculoskeletal:        Right hip: She exhibits normal range of motion and normal strength. Left hip: She exhibits normal range of motion and normal strength. Cervical back: She exhibits normal range of motion and no tenderness.         Thoracic back: She exhibits normal range of motion and no tenderness. Lumbar back: She exhibits normal range of motion and no tenderness. Lymphadenopathy:     She has no cervical adenopathy. Neurological: She is alert and oriented to person, place, and time. She has normal strength. No cranial nerve deficit. Coordination normal.   Skin: Skin is warm and dry. Ecchymosis (scattered) noted. Psychiatric: She has a normal mood and affect. Her behavior is normal.           Assessment/Plan:  Visit Diagnoses and Associated Orders     BIN (acute kidney injury) ([facility]    -  Primary    Improving, last creat 0.96. Stop Mobic. Lasix 20 mg daily per nephrology    MI DISCHARGE MEDS RECONCILED W/ CURRENT OUTPATIENT MED LIST [6528K CPT(R)]           Prediabetes        Last A1C 5.7, metformin stopped, continue Januvia.    glucose monitoring kit (FREESTYLE) monitoring kit [10095]           Hypertension, unspecified type        BP at goal, continue lisinopril, Coreg, hydralazine         History of MI (myocardial infarction)        aspirin EC 81 MG EC tablet [53297]           Frequent falls        Patient instructed to use walker, continue PT    MI DISCHARGE MEDS RECONCILED W/ CURRENT OUTPATIENT MED LIST [3843H CPT(R)]           Laceration of scalp, subsequent encounter        s/p suture removal, no signs of infection. MI DISCHARGE MEDS RECONCILED W/ CURRENT OUTPATIENT MED LIST [5359Y CPT(R)]           Bilateral impacted cerumen        Patient has Debrox at home, to use 5-10 gtts each ear BID x 5 days, then return for irrigation         Vision changes        Patient to schedule with eye doctor, Dr Brandon Putnam.                 Medical Decision Making: moderate complexity    Melissa Pandey, APRN - CNP

## 2019-06-11 NOTE — PROGRESS NOTES
Sha 89 PROGRESS NOTE      Patient here today to review Medication Agreement    Chief Complaint:  Low back pain    HPI: She c/o low back pain radiating down hips. No history lumbar surgery. Pain is unchanged. She had RFA January,SI joint 2019  only helped for 1 day. She gets relief of pain when she sits down. She had a fall at home  States got up during night and fell into corner of [facility]. She was admitted tot [facility] for 2 days. She saw a Nephrologist, she states has new issues with kidneys. She ended up getting staples in her head when she fell. She is getting home PT and home OT. She had lumbar facet injection in 2017 right 80-85% relief and left 50% relief. Back Pain   This is a chronic problem. The current episode started more than 1 year ago. The problem occurs constantly. The problem has been gradually worsening since onset. The pain is present in the lumbar spine. The quality of the pain is described as aching. Radiates to: to hips. The pain is at a severity of 4/10. The pain is moderate. The pain is worse during the night. Exacerbated by: walking. Associated symptoms include numbness. (Right thigh) Risk factors include menopause, obesity and sedentary lifestyle. She has tried analgesics and ice for the symptoms. The treatment provided mild relief. Patient denies any new neurological symptoms. No bowel or bladder incontinence, no weakness, and no falling.     Treatment goals:  Functional status: reduce pain 2-3        Aberrancy:   Any alcoholic beverages  no     Any illegal drugs  no         Analgesia:pain 5        Adverse  Effects :BM daily     ADL;s :stretching sometimes            Pill count: appropriate #16 tabs percocet    Morphine equivalent dose as reported on OARRS:15  Periodic Controlled Substance Monitoring: Possible medication side effects, risk of tolerance/dependence & alternative treatments discussed., Obtaining appropriate analgesic effect of Detected   Final 05/14/2019  9:00 AM [facility]   Carisoprodol, Ur Not Detected   Final 05/14/2019  9:00 AM [facility]   (NOTE)   The carisoprodol immunoassay has cross-reactivity to carisoprodol   and meprobamate.     Clonazepam, Urine Not Detected   Final 05/14/2019  9:00 AM [facility]   Codeine, Urine Not Detected   Final 05/14/2019  9:00 AM [facility]   MDA, Ur Not Detected   Final 05/14/2019  9:00 AM [facility]   Diazepam, Urine Not Detected   Final 05/14/2019  9:00 AM [facility]   Ethyl Glucuronide Ur Not Detected   Final 05/14/2019  9:00 AM [facility]   Fentanyl, Ur Not Detected   Final 05/14/2019  9:00 AM [facility]   Hydrocodone, Urine Not Detected   Final 05/14/2019  9:00 AM [facility]   Hydromorphone, Urine Not Detected   Final 05/14/2019  9:00 AM [facility]   Lorazepam, Urine Not Detected   Final 05/14/2019  9:00 AM [facility]   Marijuana Metab, Ur Not Detected   Final 05/14/2019  9:00 AM [facility]   MDEA, RADHA, Ur Not Detected   Final 05/14/2019  9:00 AM [facility]   MDMA, Urine Not Detected   Final 05/14/2019  9:00 AM [facility]   Meperidine Metab, Ur Not Detected   Final 05/14/2019  9:00 AM [facility]   Methadone, Urine Not Detected   Final 05/14/2019  9:00 AM [facility]   Methamphetamine, Urine Not Detected   Final 05/14/2019  9:00 AM [facility]   Methylphenidate Not Detected   Final 05/14/2019  9:00 AM [facility]   Midazolam, Urine Not Detected   Final 05/14/2019  9:00 AM [facility]   Morphine Urine Not Detected   Final 05/14/2019  9:00 AM [facility]   Norbuprenorphine, Urine Not Detected   Final 05/14/2019  9:00 AM [facility]   Nordiazepam, Urine Not Detected   Final 05/14/2019  9:00 AM [facility]   Norfentanyl, Urine Not Detected   Final 05/14/2019  9:00 AM [facility]   NORHYDROCODONE, URINE Not Detected   Final 05/14/2019  9:00 AM [facility]   Noroxycodone, Urine Not Detected   Final 05/14/2019  9:00 AM [facility]   NOROXYMORPHONE, URINE Not Detected   Final 05/14/2019  9:00 AM [facility]   Oxazepam, Urine Not Detected   Final 05/14/2019  9:00 AM [facility]   Oxycodone Urine Not Detected   Final 05/14/2019  9:00 AM [facility]   Oxymorphone, Urine Not Detected Final 05/14/2019  9:00 AM [facility]   PCP, Urine Not Detected   Final 05/14/2019  9:00 AM [facility]   Phentermine, Ur Not Detected   Final 05/14/2019  9:00 AM [facility]   Propoxyphene, Urine Not Detected   Final 05/14/2019  9:00 AM [facility]   Tapentadol-O-Sulfate, Urine Not Detected   Final 05/14/2019  9:00 AM [facility]   Tapentadol, Urine Not Detected   Final 05/14/2019  9:00 AM [facility]   Temazepam, Urine Not Detected   Final 05/14/2019  9:00 AM [facility]   Tramadol, Urine Not Detected   Final 05/14/2019  9:00 AM [facility]   Zolpidem, Urine Not Detected   Final 05/14/2019  9:00 AM [facility]   Drugs Expected, Ur   Final 05/14/2019  9:00 AM [facility] [address]  Lab   HYDROCODONE 5/12/19    Creatinine, Ur 119.2  20.0 - 400.0 mg/dL Final 05/14/2019  9:00 AM [facility]   Pain Mgt Drug Panel, Hi Res, Ur See Below   Final 05/14/2019  9:00 AM [facility]   (NOTE)   Methodology: Qualitative Enzyme Immunoassay and Qualitative Liquid   Chromatography-Time of Flight-Mass Spectrometry or Tandem Mass   Spectrometry, Quantitative Spectrophotometry   The absence of expected drug(s) and/or drug metabolite(s) may   indicate non-compliance, inappropriate timing of specimen   collection relative to drug administration, poor drug absorption,   diluted/adulterated urine, or limitations of testing. The   concentration must be greater than or equal to the cutoff to be   reported as present.  If specific drug concentrations are   required, contact the laboratory within two weeks of specimen   collection to request quantification by a second analytical   technique. Interpretive questions should be directed to the   laboratory. Results based on immunoassay detection that do not match clinical   expectations should be   interpreted with caution. Confirmatory testing by mass   spectrometry for immunoassay-based results is available, if   ordered within two weeks of specimen collection. Additional   charges apply. For medical purposes only; not valid for forensic use.    This test was developed and its performance characteristics   determined by [facility]. The U.S. Food and Drug   Administration has not approved or cleared this test; however, FDA   clearance or approval is not currently required for clinical use. The results are not intended to be used as the sole means for   clinical diagnosis or patient management decisions. EER Hi Res Interp Ur See Note   Final 05/14/2019  9:00 AM [facility]   (NOTE)   Access [facility] Enhanced Report using either link below:   -Direct access: [email or web address]   -Enter Username, Password: [email or web address]   Username: [email or web address]   Password: [email or web address] Performed by [facility] Corona,   [place] , [id number] [facility] [phone]   www. Lázaro Peña MD, Lab.  Director            Past Medical History:   Diagnosis Date    Allergic rhinitis, cause unspecified     Back pain     lumbar    Bowel obstruction (HCC)     history of due to scar tissue, resolved non-surgically    CAD (coronary artery disease)     Cellulitis     left leg    Cellulitis 2017 August    leg left leg/bug bite    Diverticulosis of colon (without mention of hemorrhage)     GERD (gastroesophageal reflux disease)     History of MI (myocardial infarction) 2005    thought due to a blood clot    History of ovarian cyst 1970    had oopherectomy    History of peritonitis 1968    due to ruptured appendix age 13    HTN (hypertension)     Hx of blood clots     right leg    Hyperlipidemia     Intestinal or peritoneal adhesions with obstruction (postoperative) (postinfection) ([facility] [address]     Kidney infection     renal failure/sepsis/spider bite    Lateral epicondylitis  of elbow     Muscle strain     right posterior shoulder    Other abnormal glucose     Restless legs syndrome (RLS)     Vitamin D deficiency     Wears glasses        Past Surgical History:   Procedure Laterality Date    ABDOMEN SURGERY  1976    benign tumor removed near remaining overy, 1.5 pounds    APPENDECTOMY  1968    appendix ruptured, developed peritonitis    BUNIONECTOMY Left     along with calcium deposits removed   [address]  2005    negative    COLECTOMY  1969    12 INCHES REMOVED D/T OBSTRUCTION    COLONOSCOPY      CYST REMOVAL Right     right facial    HYSTERECTOMY  1973    taken as a result of recurring cysts    OTHER SURGICAL HISTORY  8/14/14    FESS    OVARY REMOVAL  1970    UNILATERAL due to cyst    OVARY REMOVAL  1971    partial, due to cyst    SINUS SURGERY  2004    UPPER GASTROINTESTINAL ENDOSCOPY N/A 5/31/2019    EGD ESOPHAGOGASTRODUODENOSCOPY performed by Wesley Perez MD at [facility] Left 3/5/2019    WRIST OPEN REDUCTION INTERNAL FIXATION performed by Destiney Blanca MD at [facility]   Allergen Reactions    Bactrim [Sulfamethoxazole-Trimethoprim] Other (See Comments)     sepsis    Codeine Itching    Seasonal          Current Outpatient Medications:     [START ON 6/19/2019] oxyCODONE-acetaminophen (PERCOCET) 5-325 MG per tablet, Take 1 tablet by mouth 2 times daily as needed for Pain for up to 30 days. , Disp: 60 tablet, Rfl: 0    amLODIPine (NORVASC) 10 MG tablet, Take 1 tablet by mouth daily, Disp: 30 tablet, Rfl: 1    lisinopril (PRINIVIL;ZESTRIL) 10 MG tablet, Take 1 tablet by mouth daily, Disp: 30 tablet, Rfl: 3    cholestyramine (QUESTRAN) 4 g packet, Take 1 packet by mouth 2 times daily, Disp: 90 packet, Rfl: 3    SITagliptin (JANUVIA) 100 MG tablet, Take 1 tablet by mouth daily, Disp: 90 tablet, Rfl: 3    meloxicam (MOBIC) 15 MG tablet, Take 1 tablet by mouth daily, Disp: 90 tablet, Rfl: 3    fenofibrate micronized (LOFIBRA) 134 MG capsule, Take 1 capsule by mouth every morning (before breakfast), Disp: 90 capsule, Rfl: 3    citalopram (CELEXA) 10 MG tablet, Take 1 tablet by mouth daily, Disp: 90 tablet, Rfl: 3    spironolactone (ALDACTONE) 25 MG tablet, Take 1 tablet by mouth daily, Disp: 90 tablet, Rfl: 3    carvedilol (COREG) 12.5 MG tablet, Take 1 tablet by mouth 2 times daily, Disp: 180 tablet, Rfl: 3    gabapentin (NEURONTIN) 300 MG capsule, Take 1 capsule by mouth nightly for 180 days. , Disp: 90 capsule, Rfl: 1    cyanocobalamin 1000 MCG/ML injection, Inject 1 mL into the muscle once for 1 dose, Disp: 1 mL, Rfl: 0    lidocaine (XYLOCAINE) 5 % ointment, 4-5 times a day to your right thigh for pain., Disp: 240 g, Rfl: 3    furosemide (LASIX) 40 MG tablet, Take 1 tablet by mouth daily, Disp: 90 tablet, Rfl: 2    famotidine (PEPCID) 20 MG tablet, Take 1 tablet by mouth 2 times daily, Disp: 180 tablet, Rfl: 3    pramipexole (MIRAPEX) 1 MG tablet, Take 1.5 tablets by mouth daily, Disp: 145 tablet, Rfl: 3    atorvastatin (LIPITOR) 80 MG tablet, Take 1 tablet by mouth nightly, Disp: 90 tablet, Rfl: 3    hydrALAZINE (APRESOLINE) 100 MG tablet, Take 1 tablet by mouth every 8 hours, Disp: 90 tablet, Rfl: 3    vitamin D (CHOLECALCIFEROL) 1000 UNIT TABS tablet, Take 1 tablet by mouth daily, Disp: 90 tablet, Rfl: 3    Calcium Carbonate-Vitamin D (CALCIUM 500/D) 500-125 MG-UNIT TABS, Take 1 tablet by mouth 2 times daily , Disp: , Rfl:     acetaminophen (TYLENOL) 500 MG tablet, Take 500 mg by mouth every 6 hours as needed for Pain, Disp: , Rfl:     nitroGLYCERIN (NITROSTAT) 0.4 MG SL tablet, Place 1 tablet under the tongue every 5 minutes as needed for Chest pain, Disp: 75 tablet, Rfl: 3    cyclobenzaprine (FLEXERIL) 10 MG tablet, Take 1 tablet by mouth 3 times daily as needed for Muscle spasms, Disp: 270 tablet, Rfl: 3    Family History   Problem Relation Age of Onset    Stroke Mother     Diabetes Mother     Heart Disease Mother     High Blood Pressure Mother     Heart Disease Father     Heart Disease Brother     High Blood Pressure Brother     Heart Disease Maternal Grandmother     High Blood Pressure Sister        Social History Socioeconomic History    Marital status:      Spouse name: Not on file    Number of children: Not on file    Years of education: Not on file    Highest education level: Not on file   Occupational History    Occupation: retired   Social Needs    Financial resource strain: Not on file    Food insecurity:     Worry: Not on file     Inability: Not on file   [employer] needs:     Medical: Not on file     Non-medical: Not on file   Tobacco Use    Smoking status: Former Smoker     Packs/day: 0.50     Years: 20.00     Pack years: 10.00     Types: Cigarettes     Start date: 1995     Last attempt to quit: 2017     Years since quittin.9    Smokeless tobacco: Never Used    Tobacco comment: 18 quit 10 days ago   Substance and Sexual Activity    Alcohol use: No     Alcohol/week: 0.0 oz     Comment: occasionally    Drug use: No    Sexual activity: Not on file   Lifestyle    Physical activity:     Days per week: Not on file     Minutes per session: Not on file    Stress: Not on file   Relationships    Social connections:     Talks on phone: Not on file     Gets together: Not on file     Attends [employer] service: Not on file     Active member of club or organization: Not on file     Attends meetings of clubs or organizations: Not on file     Relationship status: Not on file    Intimate partner violence:     Fear of current or ex partner: Not on file     Emotionally abused: Not on file     Physically abused: Not on file     Forced sexual activity: Not on file   Other Topics Concern    Not on file   Social History Narrative    Not on file       Review of Systems:  Review of Systems   Constitution: Negative. Eyes: Positive for visual disturbance. Cardiovascular: Positive for dyspnea on exertion. Respiratory: Negative. Endocrine:        Diabetic   Hematologic/Lymphatic: Bruises/bleeds easily. Skin: Negative.     Musculoskeletal: Positive for back pain, falls and joint pain.   Gastrointestinal: Negative. Genitourinary: Negative. Neurological: Positive for loss of balance and numbness. Psychiatric/Behavioral: Negative. Physical Exam:  /68   Pulse 72   Temp 98.2 °F (36.8 °C) (Oral)   Resp 16   Ht 5' 3\" (1.6 m)   Wt 179 lb (81.2 kg)   SpO2 95%   BMI 31.71 kg/m²     Physical Exam   Constitutional: She appears well-developed. HENT:   Head: Normocephalic. Neck: Normal range of motion. Neck supple. Pulmonary/Chest: Effort normal.   Musculoskeletal:        Right hip: She exhibits tenderness. Lumbar back: She exhibits decreased range of motion and tenderness. Neurological: She is alert. She has normal strength. Reflex Scores:       Patellar reflexes are 2+ on the right side and 2+ on the left side. Achilles reflexes are 2+ on the right side and 2+ on the left side. Skin: Skin is warm, dry and intact. Psychiatric: She has a normal mood and affect.  Her speech is normal and behavior is normal. Judgment and thought content normal. Cognition and memory are normal.         Assessment:  Problem List Items Addressed This Visit     Spondylosis of lumbar region without myelopathy or radiculopathy    Relevant Medications    oxyCODONE-acetaminophen (PERCOCET) 5-325 MG per tablet (Start on 6/19/2019)    Sacroiliitis ([address]    Relevant Medications    oxyCODONE-acetaminophen (PERCOCET) 5-325 MG per tablet (Start on 6/19/2019)    Neurogenic claudication due to lumbar spinal stenosis    Relevant Medications    oxyCODONE-acetaminophen (PERCOCET) 5-325 MG per tablet (Start on 6/19/2019)    Meralgia paresthetica - Primary    Relevant Medications    oxyCODONE-acetaminophen (PERCOCET) 5-325 MG per tablet (Start on 6/19/2019)    Lumbar facet arthropathy    Relevant Medications    oxyCODONE-acetaminophen (PERCOCET) 5-325 MG per tablet (Start on 6/19/2019)    Lumbar degenerative disc disease    Relevant Medications    oxyCODONE-acetaminophen as tolerated. Counseled patient on effects their pain medication and /or their medical condition mayhave on their  ability to drive or operate machinery. Instructed not to drive or operate machinery if drowsy     I also discussed with the patient regarding the dangers of combining narcotic pain medication with tranquilizers, alcohol or illegal drugs or taking the medication any way other than prescribed. The dangers were discussed  including respiratory depression and death. Patient was told to tell  all  physicians regarding the medications he is getting from pain clinic. Patient is warned not to take any unprescribed medications over-the-countermedications that can depress breathing . Patient is advised to talk to the pharmacist or physicians if planning to take any over-the-counter medications before  takeing them. Patient is strongly advised to avoid tranquilizers or  relaxants, illegal drugs  or any medications that can depress breathing  Patient is also advised to tell us if there is any changes in their medications from other physicians.     Discussed injection for low back pain, she declined    TREATMENT OPTIONS:     Return in 4 weeks  Medication Agreement Requirements Met  Continue Opioid therapy  Script written for percocet  Follow up appointment made

## 2019-06-12 ENCOUNTER — CARE COORDINATION (OUTPATIENT)
Dept: CASE MANAGEMENT | Age: 69
End: 2019-06-12

## 2019-06-12 ENCOUNTER — TELEPHONE (OUTPATIENT)
Dept: INTERNAL MEDICINE CLINIC | Age: 69
End: 2019-06-12

## 2019-06-12 LAB
ESTIMATED AVERAGE GLUCOSE: 117 MG/DL
HBA1C MFR BLD: 5.7 % (ref 4–6)

## 2019-06-12 ASSESSMENT — ENCOUNTER SYMPTOMS: COLOR CHANGE: 1

## 2019-06-12 NOTE — TELEPHONE ENCOUNTER
Patients home health agency calling to report that patient told them that as she was leaving our office yesterday she fell in the parking lot on her way back to her car. Caller reported that patient did not hit her head and no LOC. Caller states that patient reported a few small scrapes to her left hand and left knee. They advised her about proper foot wear (patient was reportedly wearing flip flops), use of a cane or walker. Writer will notify practice manager and file [facility] report.

## 2019-06-12 NOTE — CARE COORDINATION
[facility] Transitions Follow Up Call    2019    Patient: Brittany García  Patient : 1950   MRN: [id number]  Reason for Admission: BIN  Discharge Date: 19 RARS: Readmission Risk Score: 15         Spoke with: Patsy Krabbe  Patient stated she is doing ok, tripped and fell leaving her doctor's appointment yesterday, scrapped a couple of her fingers and her knee, incident was reported to PCP's office by the VN. Patient stated she was a little sore but felt that she tripped d/t her wearing shoes that just slide on, stated she is going to start using her cane or her walker when she goes out. Patient had an appointment with Dr. Leobardo Turner, izaiah were removed, stated doctor said everything looked good. Patsy Krabbe stated she has an appointment with Dr. Addison Hernadez on 19, still has VNS, confirmed contact information, will follow//JU    Care Transitions Subsequent and Final Call    Subsequent and Final Calls  Do you have any ongoing symptoms?:  Yes  Onset of Patient-reported symptoms:  Yesterday  Patient-reported symptoms:  Other  Have your medications changed?:  No  Do you have any questions related to your medications?:  No  Do you currently have any active services?:  Yes  Are you currently active with any services?:  Home Health  Do you have any needs or concerns that I can assist you with?:  No  Care Transitions Interventions  Other Interventions:             Follow Up  Future Appointments   Date Time Provider Britni Marshall   2019 10:00 AM JOVANY Ash [address]   2019  4:00 PM Cherrie Thakkar MD [address]   2019 10:20 AM Yung Singletary MD [facility] None   2019  9:40 AM Iglesia Escobar MD [facility] Yodit Feliz RN

## 2019-06-14 ENCOUNTER — CARE COORDINATION (OUTPATIENT)
Dept: CASE MANAGEMENT | Age: 69
End: 2019-06-14

## 2019-06-14 NOTE — CARE COORDINATION
[facility] Transitions Follow Up Call    2019    Patient: Teresita Martins  Patient : 1950   MRN: [id number]  Reason for Admission: BIN  Discharge Date: 19 RARS: Readmission Risk Score: 13         Final call- unable to reach patient, left vm message with name and call back number, requested call back with any needs or concerns, informed of final call, care transitions completed//JU    Care Transitions Subsequent and Final Call    Subsequent and Final Calls  Are you currently active with any services?:  Home Health  Care Transitions Interventions  Other Interventions:             Follow Up  Future Appointments   Date Time Provider Britni Marshall   2019 10:00 AM JOVANY Ash [address]   2019  4:00 PM Samantha Dean MD [address]   2019 10:30 AM ST DEXA RM [facility]   2019 10:20 AM MAIK Pruitt - CNP [facility] None   2019  9:40 AM Melinda Loya MD [facility] Loida Grajeda RN

## 2019-06-24 ENCOUNTER — HOSPITAL ENCOUNTER (OUTPATIENT)
Age: 69
Setting detail: SPECIMEN
Discharge: HOME | End: 2019-06-24
Payer: MEDICARE

## 2019-06-24 DIAGNOSIS — D50.8 IRON DEFICIENCY ANEMIA SECONDARY TO INADEQUATE DIETARY IRON INTAKE: ICD-10-CM

## 2019-06-24 LAB — IRON: 102 UG/DL (ref 37–145)

## 2019-06-27 ENCOUNTER — OFFICE VISIT (OUTPATIENT)
Dept: INTERNAL MEDICINE CLINIC | Age: 69
End: 2019-06-27
Payer: MEDICARE

## 2019-06-27 VITALS
WEIGHT: 174 LBS | BODY MASS INDEX: 30.83 KG/M2 | SYSTOLIC BLOOD PRESSURE: 112 MMHG | DIASTOLIC BLOOD PRESSURE: 64 MMHG | HEIGHT: 63 IN

## 2019-06-27 DIAGNOSIS — I50.32 CHRONIC DIASTOLIC HEART FAILURE (HCC): ICD-10-CM

## 2019-06-27 DIAGNOSIS — M47.816 LUMBAR FACET ARTHROPATHY: ICD-10-CM

## 2019-06-27 DIAGNOSIS — F32.A DEPRESSION, UNSPECIFIED DEPRESSION TYPE: ICD-10-CM

## 2019-06-27 DIAGNOSIS — I10 ESSENTIAL HYPERTENSION: ICD-10-CM

## 2019-06-27 DIAGNOSIS — E78.2 MIXED HYPERLIPIDEMIA: Primary | ICD-10-CM

## 2019-06-27 DIAGNOSIS — N18.30 STAGE 3 CHRONIC KIDNEY DISEASE (HCC): ICD-10-CM

## 2019-06-27 PROCEDURE — G8598 ASA/ANTIPLAT THER USED: HCPCS | Performed by: INTERNAL MEDICINE

## 2019-06-27 PROCEDURE — 1111F DSCHRG MED/CURRENT MED MERGE: CPT | Performed by: INTERNAL MEDICINE

## 2019-06-27 PROCEDURE — 1036F TOBACCO NON-USER: CPT | Performed by: INTERNAL MEDICINE

## 2019-06-27 PROCEDURE — G8399 PT W/DXA RESULTS DOCUMENT: HCPCS | Performed by: INTERNAL MEDICINE

## 2019-06-27 PROCEDURE — 96372 THER/PROPH/DIAG INJ SC/IM: CPT | Performed by: INTERNAL MEDICINE

## 2019-06-27 PROCEDURE — 99214 OFFICE O/P EST MOD 30 MIN: CPT | Performed by: INTERNAL MEDICINE

## 2019-06-27 PROCEDURE — 1090F PRES/ABSN URINE INCON ASSESS: CPT | Performed by: INTERNAL MEDICINE

## 2019-06-27 PROCEDURE — G8417 CALC BMI ABV UP PARAM F/U: HCPCS | Performed by: INTERNAL MEDICINE

## 2019-06-27 PROCEDURE — 3017F COLORECTAL CA SCREEN DOC REV: CPT | Performed by: INTERNAL MEDICINE

## 2019-06-27 PROCEDURE — 1123F ACP DISCUSS/DSCN MKR DOCD: CPT | Performed by: INTERNAL MEDICINE

## 2019-06-27 PROCEDURE — 4040F PNEUMOC VAC/ADMIN/RCVD: CPT | Performed by: INTERNAL MEDICINE

## 2019-06-27 PROCEDURE — G8427 DOCREV CUR MEDS BY ELIG CLIN: HCPCS | Performed by: INTERNAL MEDICINE

## 2019-06-27 RX ORDER — LISINOPRIL 10 MG/1
10 TABLET ORAL DAILY
Qty: 30 TABLET | Refills: 3 | Status: SHIPPED | OUTPATIENT
Start: 2019-06-27 | End: 2019-08-13 | Stop reason: SDUPTHER

## 2019-06-27 RX ORDER — CYANOCOBALAMIN 1000 UG/ML
1000 INJECTION INTRAMUSCULAR; SUBCUTANEOUS ONCE
Status: COMPLETED | OUTPATIENT
Start: 2019-06-27 | End: 2019-06-27

## 2019-06-27 RX ORDER — LANCETS 30 GAUGE
1 EACH MISCELLANEOUS DAILY
Qty: 100 EACH | Refills: 3 | Status: SHIPPED | OUTPATIENT
Start: 2019-06-27 | End: 2019-10-10 | Stop reason: SDUPTHER

## 2019-06-27 RX ORDER — GLUCOSAMINE HCL/CHONDROITIN SU 500-400 MG
CAPSULE ORAL
Qty: 100 STRIP | Refills: 5 | Status: SHIPPED | OUTPATIENT
Start: 2019-06-27 | End: 2019-10-10 | Stop reason: SDUPTHER

## 2019-06-27 RX ORDER — CITALOPRAM 10 MG/1
10 TABLET ORAL DAILY
Qty: 90 TABLET | Refills: 3 | Status: SHIPPED | OUTPATIENT
Start: 2019-06-27 | End: 2019-12-31 | Stop reason: SDUPTHER

## 2019-06-27 RX ORDER — AMLODIPINE BESYLATE 10 MG/1
10 TABLET ORAL DAILY
Qty: 30 TABLET | Refills: 1 | Status: SHIPPED | OUTPATIENT
Start: 2019-06-27 | End: 2019-07-23 | Stop reason: SDUPTHER

## 2019-06-27 RX ADMIN — CYANOCOBALAMIN 1000 MCG: 1000 INJECTION INTRAMUSCULAR; SUBCUTANEOUS at 16:49

## 2019-06-27 ASSESSMENT — ENCOUNTER SYMPTOMS
ABDOMINAL PAIN: 0
CHOKING: 0
CONSTIPATION: 0
TROUBLE SWALLOWING: 0
BACK PAIN: 1
COUGH: 0
ANAL BLEEDING: 0
SHORTNESS OF BREATH: 0
BLOOD IN STOOL: 0
DIARRHEA: 0
EYE PAIN: 0
EYE REDNESS: 0
STRIDOR: 0
VOMITING: 0
APNEA: 0
RECTAL PAIN: 0
SINUS PRESSURE: 0
VOICE CHANGE: 0
EYE DISCHARGE: 0
FACIAL SWELLING: 0
CHEST TIGHTNESS: 0
WHEEZING: 0
RHINORRHEA: 0
COLOR CHANGE: 0
ABDOMINAL DISTENTION: 0
NAUSEA: 0
PHOTOPHOBIA: 0
EYE ITCHING: 0
SORE THROAT: 0

## 2019-06-27 NOTE — PROGRESS NOTES
Subjective:      Mrytle Fonseca is a 77 y.o. female who presents today for follow up and management of her chronic medical conditions as noted below. HPI:    Myrtle Fonseca is c/o of   Chief Complaint   Patient presents with    Hypertension     1 month f/u for BP     Injections     B12 injection     Results     Review MRI    . back pain had pain/managment    Past Medical History:   Diagnosis Date    Allergic rhinitis, cause unspecified     Back pain     lumbar    Bowel obstruction (HCC)     history of due to scar tissue, resolved non-surgically    CAD (coronary artery disease)     Cellulitis     left leg    Cellulitis 2017 August    leg left leg/bug bite    Diverticulosis of colon (without mention of hemorrhage)     GERD (gastroesophageal reflux disease)     History of MI (myocardial infarction) 2005    thought due to a blood clot    History of ovarian cyst 1970    had oopherectomy    History of peritonitis 1968    due to ruptured appendix age 13    HTN (hypertension)     Hx of blood clots     right leg    Hyperlipidemia     Intestinal or peritoneal adhesions with obstruction (postoperative) (postinfection) ([facility] [address]     Kidney infection     renal failure/sepsis/spider bite    Lateral epicondylitis  of elbow     Muscle strain     right posterior shoulder    Other abnormal glucose     Restless legs syndrome (RLS)     Vitamin D deficiency     Wears glasses        Past Surgical History:   Procedure Laterality Date    ABDOMEN SURGERY  1976    benign tumor removed near remaining overy, 1.5 pounds    APPENDECTOMY  1968    appendix ruptured, developed peritonitis    BUNIONECTOMY Left     along with calcium deposits removed   [address]  2005    negative    COLECTOMY  1969    12 INCHES REMOVED D/T OBSTRUCTION    COLONOSCOPY      CYST REMOVAL Right     right facial    HYSTERECTOMY  1973    taken as a result of recurring cysts    OTHER SURGICAL HISTORY  15    FESS    OVARY REMOVAL  1970    UNILATERAL due to cyst    OVARY REMOVAL      partial, due to cyst   Jennifer Alatorre SINUS SURGERY      UPPER GASTROINTESTINAL ENDOSCOPY N/A 2019    EGD ESOPHAGOGASTRODUODENOSCOPY performed by Wesley Perez MD at [address] Left 3/5/2019    WRIST OPEN REDUCTION INTERNAL FIXATION performed by Destiney Blanca MD at [address] History   Problem Relation Age of Onset    Stroke Mother     Diabetes Mother     Heart Disease Mother     High Blood Pressure Mother     Heart Disease Father     Heart Disease Brother     High Blood Pressure Brother     Heart Disease Maternal Grandmother     High Blood Pressure Sister        Social History     Socioeconomic History    Marital status:      Spouse name: Not on file    Number of children: Not on file    Years of education: Not on file    Highest education level: Not on file   Occupational History    Occupation: retired   Social Needs    Financial resource strain: Not on file    Food insecurity:     Worry: Not on file     Inability: Not on file   [employer] needs:     Medical: Not on file     Non-medical: Not on file   Tobacco Use    Smoking status: Former Smoker     Packs/day: 0.50     Years: 20.00     Pack years: 10.00     Types: Cigarettes     Start date: 1995     Last attempt to quit: 2017     Years since quittin.0    Smokeless tobacco: Never Used    Tobacco comment: 18 quit 10 days ago   Substance and Sexual Activity    Alcohol use: No     Alcohol/week: 0.0 oz     Comment: occasionally    Drug use: No    Sexual activity: Not on file   Lifestyle    Physical activity:     Days per week: Not on file     Minutes per session: Not on file    Stress: Not on file   Relationships    Social connections:     Talks on phone: Not on file     Gets together: Not on file     Attends [employer] service: Not on file     Active member of club or for pain. 240 g 3    furosemide (LASIX) 40 MG tablet Take 1 tablet by mouth daily (Patient taking differently: Take 20 mg by mouth daily ) 90 tablet 2    famotidine (PEPCID) 20 MG tablet Take 1 tablet by mouth 2 times daily 180 tablet 3    pramipexole (MIRAPEX) 1 MG tablet Take 1.5 tablets by mouth daily 145 tablet 3    atorvastatin (LIPITOR) 80 MG tablet Take 1 tablet by mouth nightly 90 tablet 3    hydrALAZINE (APRESOLINE) 100 MG tablet Take 1 tablet by mouth every 8 hours 90 tablet 3    nitroGLYCERIN (NITROSTAT) 0.4 MG SL tablet Place 1 tablet under the tongue every 5 minutes as needed for Chest pain 75 tablet 3    vitamin D (CHOLECALCIFEROL) 1000 UNIT TABS tablet Take 1 tablet by mouth daily 90 tablet 3    cyclobenzaprine (FLEXERIL) 10 MG tablet Take 1 tablet by mouth 3 times daily as needed for Muscle spasms 270 tablet 3    Calcium Carbonate-Vitamin D (CALCIUM 500/D) 500-125 MG-UNIT TABS Take 1 tablet by mouth 2 times daily       cyanocobalamin 1000 MCG/ML injection Inject 1 mL into the muscle once for 1 dose 1 mL 0     No current facility-administered medications for this visit. Review of Systems:    Review of Systems   Constitutional: Negative for activity change, appetite change, chills, diaphoresis, fatigue and fever. HENT: Negative for congestion, dental problem, drooling, ear discharge, ear pain, facial swelling, hearing loss, mouth sores, nosebleeds, postnasal drip, rhinorrhea, sinus pressure, sneezing, sore throat, tinnitus, trouble swallowing and voice change. Eyes: Negative for photophobia, pain, discharge, redness, itching and visual disturbance. Respiratory: Negative for apnea, cough, choking, chest tightness, shortness of breath, wheezing and stridor. Cardiovascular: Negative for chest pain, palpitations and leg swelling.    Gastrointestinal: Negative for abdominal distention, abdominal pain, anal bleeding, blood in stool, constipation, diarrhea, nausea, rectal pain and vomiting. Endocrine: Negative for cold intolerance, heat intolerance, polydipsia, polyphagia and polyuria. Genitourinary: Negative for decreased urine volume, difficulty urinating, dyspareunia, dysuria, enuresis, flank pain, frequency, genital sores, hematuria, menstrual problem, pelvic pain, urgency, vaginal bleeding and vaginal discharge. Musculoskeletal: Positive for back pain. Negative for arthralgias, gait problem, joint swelling, myalgias, neck pain and neck stiffness. Skin: Negative for color change, pallor, rash and wound. Neurological: Negative for dizziness, tremors, seizures, syncope, facial asymmetry, speech difficulty, weakness, light-headedness, numbness and headaches. Hematological: Negative for adenopathy. Does not bruise/bleed easily. Psychiatric/Behavioral: Positive for dysphoric mood and sleep disturbance. Negative for agitation, behavioral problems, confusion and decreased concentration. The patient is nervous/anxious. Objective:     PhysicalExam:      Physical Exam   Constitutional: She is oriented to person, place, and time. She appears well-developed and well-nourished. No distress. HENT:   Head: Normocephalic and atraumatic. Right Ear: External ear normal.   Left Ear: External ear normal.   Nose: Nose normal.   Mouth/Throat: Oropharynx is clear and moist. No oropharyngeal exudate. Eyes: Pupils are equal, round, and reactive to light. Conjunctivae and EOM are normal. Right eye exhibits no discharge. Left eye exhibits no discharge. No scleral icterus. Neck: Normal range of motion. Neck supple. No JVD present. No tracheal deviation present. No thyromegaly present. Cardiovascular: Normal rate, regular rhythm and intact distal pulses. Exam reveals no gallop and no friction rub. Murmur heard. Pulmonary/Chest: Effort normal and breath sounds normal. No respiratory distress. She has no wheezes. She has no rales. She exhibits no tenderness. Abdominal: Soft.

## 2019-07-02 ENCOUNTER — NURSE ONLY (OUTPATIENT)
Dept: INTERNAL MEDICINE CLINIC | Age: 69
End: 2019-07-02

## 2019-07-02 DIAGNOSIS — H61.23 EXCESSIVE CERUMEN IN BOTH EAR CANALS: Primary | ICD-10-CM

## 2019-07-09 ENCOUNTER — OFFICE VISIT (OUTPATIENT)
Dept: ORTHOPEDIC SURGERY | Age: 69
End: 2019-07-09
Payer: MEDICARE

## 2019-07-09 ENCOUNTER — HOSPITAL ENCOUNTER (OUTPATIENT)
Dept: WOMENS IMAGING | Age: 69
Discharge: HOME | End: 2019-07-11
Payer: MEDICARE

## 2019-07-09 DIAGNOSIS — M47.26 OTHER SPONDYLOSIS WITH RADICULOPATHY, LUMBAR REGION: Primary | ICD-10-CM

## 2019-07-09 DIAGNOSIS — R93.6 ABNORMAL FINDINGS ON DIAGNOSTIC IMAGING OF LIMBS: ICD-10-CM

## 2019-07-09 DIAGNOSIS — M48.062 LUMBAR STENOSIS WITH NEUROGENIC CLAUDICATION: ICD-10-CM

## 2019-07-09 DIAGNOSIS — Z13.820 ENCOUNTER FOR SCREENING FOR OSTEOPOROSIS: ICD-10-CM

## 2019-07-09 PROCEDURE — 99203 OFFICE O/P NEW LOW 30 MIN: CPT | Performed by: PHYSICIAN ASSISTANT

## 2019-07-09 PROCEDURE — 77080 DXA BONE DENSITY AXIAL: CPT

## 2019-07-10 ASSESSMENT — ENCOUNTER SYMPTOMS
RHINORRHEA: 0
BACK PAIN: 1
NAUSEA: 0
COUGH: 0
COLOR CHANGE: 0
EYES NEGATIVE: 1
VOMITING: 0

## 2019-07-10 NOTE — PROGRESS NOTES
for dizziness. Physical Exam   Constitutional: She is oriented to person, place, and time. She appears well-developed and well-nourished. HENT:   Head: Normocephalic and atraumatic. Eyes: EOM are normal.   Neck: Normal range of motion. Neck supple. Cardiovascular: Normal rate. Pulmonary/Chest: Effort normal.   Abdominal: Soft. Musculoskeletal:        Lumbar back: She exhibits tenderness (Diffuse paraspinal lumbar tenderness). She exhibits no bony tenderness and no deformity. Fused paraspinal lumbar tenderness. No midline tenderness no obvious deformity or step-off. No muscle spasm palpated at this time. Flexion extension are limited secondary to pain. Positive Straight leg raise at 20 degrees on the right and 30 degrees on the left. He is observed and appears antalgic. Neurological: She is alert and oriented to person, place, and time. She has normal strength. Reflex Scores:       Patellar reflexes are 2+ on the right side and 2+ on the left side. Achilles reflexes are 2+ on the right side and 2+ on the left side. Skin: Skin is warm and dry. No rash noted. Psychiatric: She has a normal mood and affect. Her behavior is normal.       Assessment:     Encounter Diagnoses   Name Primary?     Other spondylosis with radiculopathy, lumbar region Yes    Lumbar stenosis with neurogenic claudication        Orders Placed This Encounter   Procedures    XR LUMBAR SPINE (MIN 4 VIEWS)     Standing Status:   Future     Number of Occurrences:   1     Standing Expiration Date:   7/8/2020      MRI 5/31/19 of Lumbar spine shows  Impression   1.  Severe facet degenerative changes at L4-L5 lead to minimal grade 1   anterolisthesis of L4 on L5.  Moderate spinal canal stenosis and mild to   moderate bilateral neural foraminal narrowing at this level.       2.  Severe bilateral facet degenerative changes at L5-S1.     4 views lumbar spine AP lateral flexion-extension are taken in clinic and reviewed

## 2019-07-17 ENCOUNTER — HOSPITAL ENCOUNTER (OUTPATIENT)
Dept: PAIN MANAGEMENT | Age: 69
Discharge: HOME | End: 2019-07-17
Payer: MEDICARE

## 2019-07-17 VITALS
DIASTOLIC BLOOD PRESSURE: 60 MMHG | WEIGHT: 172 LBS | HEART RATE: 68 BPM | HEIGHT: 63 IN | TEMPERATURE: 99 F | SYSTOLIC BLOOD PRESSURE: 123 MMHG | RESPIRATION RATE: 16 BRPM | BODY MASS INDEX: 30.48 KG/M2 | OXYGEN SATURATION: 98 %

## 2019-07-17 DIAGNOSIS — Z51.81 ENCOUNTER FOR MEDICATION MONITORING: ICD-10-CM

## 2019-07-17 DIAGNOSIS — M51.36 DDD (DEGENERATIVE DISC DISEASE), LUMBAR: ICD-10-CM

## 2019-07-17 DIAGNOSIS — Z51.81 MEDICATION MONITORING ENCOUNTER: ICD-10-CM

## 2019-07-17 DIAGNOSIS — M51.36 LUMBAR DEGENERATIVE DISC DISEASE: ICD-10-CM

## 2019-07-17 DIAGNOSIS — G89.29 CHRONIC MIDLINE LOW BACK PAIN WITHOUT SCIATICA: ICD-10-CM

## 2019-07-17 DIAGNOSIS — M16.11 PRIMARY OSTEOARTHRITIS OF RIGHT HIP: ICD-10-CM

## 2019-07-17 DIAGNOSIS — G57.10 MERALGIA PARESTHETICA, UNSPECIFIED LATERALITY: ICD-10-CM

## 2019-07-17 DIAGNOSIS — M47.816 SPONDYLOSIS OF LUMBAR REGION WITHOUT MYELOPATHY OR RADICULOPATHY: ICD-10-CM

## 2019-07-17 DIAGNOSIS — G57.11 MERALGIA PARESTHETICA OF RIGHT SIDE: Primary | ICD-10-CM

## 2019-07-17 DIAGNOSIS — M47.816 LUMBAR FACET ARTHROPATHY: ICD-10-CM

## 2019-07-17 DIAGNOSIS — M46.1 SACROILIITIS (HCC): ICD-10-CM

## 2019-07-17 DIAGNOSIS — M48.062 NEUROGENIC CLAUDICATION DUE TO LUMBAR SPINAL STENOSIS: ICD-10-CM

## 2019-07-17 DIAGNOSIS — M54.50 CHRONIC MIDLINE LOW BACK PAIN WITHOUT SCIATICA: ICD-10-CM

## 2019-07-17 PROCEDURE — 99213 OFFICE O/P EST LOW 20 MIN: CPT | Performed by: NURSE PRACTITIONER

## 2019-07-17 PROCEDURE — 99213 OFFICE O/P EST LOW 20 MIN: CPT

## 2019-07-17 RX ORDER — OXYCODONE HYDROCHLORIDE AND ACETAMINOPHEN 5; 325 MG/1; MG/1
1 TABLET ORAL 2 TIMES DAILY PRN
Qty: 60 TABLET | Refills: 0 | Status: SHIPPED | OUTPATIENT
Start: 2019-07-19 | End: 2019-08-18

## 2019-07-17 ASSESSMENT — ENCOUNTER SYMPTOMS
CONSTIPATION: 1
EYES NEGATIVE: 1
RESPIRATORY NEGATIVE: 1
BACK PAIN: 1

## 2019-07-17 NOTE — PROGRESS NOTES
Sister        Social History     Socioeconomic History    Marital status:      Spouse name: Not on file    Number of children: Not on file    Years of education: Not on file    Highest education level: Not on file   Occupational History    Occupation: retired   Social Needs    Financial resource strain: Not on file    Food insecurity:     Worry: Not on file     Inability: Not on file   [email or web address] needs:     Medical: Not on file     Non-medical: Not on file   Tobacco Use    Smoking status: Former Smoker     Packs/day: 0.50     Years: 20.00     Pack years: 10.00     Types: Cigarettes     Start date: 1995     Last attempt to quit: 2017     Years since quittin.0    Smokeless tobacco: Never Used    Tobacco comment: 17 quit 10 days ago   Substance and Sexual Activity    Alcohol use: No     Alcohol/week: 0.0 standard drinks     Comment: occasionally    Drug use: No    Sexual activity: Not on file   Lifestyle    Physical activity:     Days per week: Not on file     Minutes per session: Not on file    Stress: Not on file   Relationships    Social connections:     Talks on phone: Not on file     Gets together: Not on file     Attends Buddhist service: Not on file     Active member of club or organization: Not on file     Attends meetings of clubs or organizations: Not on file     Relationship status: Not on file    Intimate partner violence:     Fear of current or ex partner: Not on file     Emotionally abused: Not on file     Physically abused: Not on file     Forced sexual activity: Not on file   Other Topics Concern    Not on file   Social History Narrative    Not on file       Review of Systems:  Review of Systems   Constitution: Negative. HENT: Negative. Eyes: Negative. Cardiovascular:        Took 2 ntg this am for chest pain which helped   Respiratory: Negative. Endocrine:        Checks blood sugar   Hematologic/Lymphatic: Bruises/bleeds easily.    Skin:

## 2019-07-23 RX ORDER — AMLODIPINE BESYLATE 10 MG/1
10 TABLET ORAL DAILY
Qty: 90 TABLET | Refills: 3 | Status: SHIPPED | OUTPATIENT
Start: 2019-07-23 | End: 2019-08-14 | Stop reason: DRUGHIGH

## 2019-07-23 RX ORDER — CARVEDILOL 12.5 MG/1
12.5 TABLET ORAL 2 TIMES DAILY
Qty: 180 TABLET | Refills: 3 | Status: SHIPPED | OUTPATIENT
Start: 2019-07-23 | End: 2019-08-13 | Stop reason: SDUPTHER

## 2019-07-23 RX ORDER — FAMOTIDINE 20 MG/1
20 TABLET, FILM COATED ORAL 2 TIMES DAILY
Qty: 180 TABLET | Refills: 3 | Status: ON HOLD | OUTPATIENT
Start: 2019-07-23 | End: 2019-08-07 | Stop reason: HOSPADM

## 2019-07-29 ENCOUNTER — HOSPITAL ENCOUNTER (OUTPATIENT)
Dept: GENERAL RADIOLOGY | Age: 69
Discharge: HOME | End: 2019-07-31
Payer: MEDICARE

## 2019-07-29 ENCOUNTER — HOSPITAL ENCOUNTER (OUTPATIENT)
Dept: PAIN MANAGEMENT | Age: 69
Discharge: HOME | End: 2019-07-29
Payer: MEDICARE

## 2019-07-29 VITALS
SYSTOLIC BLOOD PRESSURE: 138 MMHG | OXYGEN SATURATION: 96 % | RESPIRATION RATE: 16 BRPM | HEIGHT: 63 IN | DIASTOLIC BLOOD PRESSURE: 64 MMHG | WEIGHT: 172 LBS | BODY MASS INDEX: 30.48 KG/M2 | TEMPERATURE: 98.6 F | HEART RATE: 70 BPM

## 2019-07-29 DIAGNOSIS — M46.1 SACROILIITIS (HCC): Primary | ICD-10-CM

## 2019-07-29 DIAGNOSIS — M46.1 SACROILIITIS (HCC): ICD-10-CM

## 2019-07-29 PROCEDURE — 6360000002 HC RX W HCPCS: Performed by: PAIN MEDICINE

## 2019-07-29 PROCEDURE — 6360000004 HC RX CONTRAST MEDICATION: Performed by: PAIN MEDICINE

## 2019-07-29 PROCEDURE — 27096 INJECT SACROILIAC JOINT: CPT | Performed by: PAIN MEDICINE

## 2019-07-29 PROCEDURE — G0260 INJ FOR SACROILIAC JT ANESTH: HCPCS

## 2019-07-29 PROCEDURE — 3209999900 FLUORO FOR SURGICAL PROCEDURES

## 2019-07-29 RX ORDER — ROPIVACAINE HYDROCHLORIDE 2 MG/ML
INJECTION, SOLUTION EPIDURAL; INFILTRATION; PERINEURAL
Status: COMPLETED | OUTPATIENT
Start: 2019-07-29 | End: 2019-07-29

## 2019-07-29 RX ORDER — MIDAZOLAM HYDROCHLORIDE 1 MG/ML
INJECTION INTRAMUSCULAR; INTRAVENOUS
Status: COMPLETED | OUTPATIENT
Start: 2019-07-29 | End: 2019-07-29

## 2019-07-29 RX ORDER — TRIAMCINOLONE ACETONIDE 40 MG/ML
INJECTION, SUSPENSION INTRA-ARTICULAR; INTRAMUSCULAR
Status: COMPLETED | OUTPATIENT
Start: 2019-07-29 | End: 2019-07-29

## 2019-07-29 RX ADMIN — IOHEXOL 3 ML: 180 INJECTION INTRAVENOUS at 08:40

## 2019-07-29 RX ADMIN — TRIAMCINOLONE ACETONIDE 40 MG: 40 INJECTION, SUSPENSION INTRA-ARTICULAR; INTRAMUSCULAR at 08:50

## 2019-07-29 RX ADMIN — ROPIVACAINE HYDROCHLORIDE 3 ML: 2 INJECTION, SOLUTION EPIDURAL; INFILTRATION at 08:39

## 2019-07-29 RX ADMIN — MIDAZOLAM 2 MG: 1 INJECTION INTRAMUSCULAR; INTRAVENOUS at 08:32

## 2019-07-29 ASSESSMENT — PAIN - FUNCTIONAL ASSESSMENT
PAIN_FUNCTIONAL_ASSESSMENT: 0-10
PAIN_FUNCTIONAL_ASSESSMENT: 0-10
PAIN_FUNCTIONAL_ASSESSMENT: PREVENTS OR INTERFERES SOME ACTIVE ACTIVITIES AND ADLS

## 2019-07-29 ASSESSMENT — PAIN DESCRIPTION - ONSET: ONSET: ON-GOING

## 2019-07-29 ASSESSMENT — ACTIVITIES OF DAILY LIVING (ADL): EFFECT OF PAIN ON DAILY ACTIVITIES: PAIN INCREASES WITH WALKING AND STANDING

## 2019-07-29 ASSESSMENT — PAIN DESCRIPTION - FREQUENCY: FREQUENCY: CONTINUOUS

## 2019-07-29 ASSESSMENT — PAIN DESCRIPTION - LOCATION: LOCATION: BACK

## 2019-07-29 ASSESSMENT — PAIN DESCRIPTION - PROGRESSION: CLINICAL_PROGRESSION: GRADUALLY WORSENING

## 2019-07-29 ASSESSMENT — PAIN SCALES - GENERAL
PAINLEVEL_OUTOF10: 3
PAINLEVEL_OUTOF10: 6

## 2019-07-29 ASSESSMENT — PAIN DESCRIPTION - PAIN TYPE: TYPE: CHRONIC PAIN

## 2019-07-29 ASSESSMENT — PAIN DESCRIPTION - ORIENTATION: ORIENTATION: LEFT

## 2019-07-29 ASSESSMENT — PAIN DESCRIPTION - DESCRIPTORS: DESCRIPTORS: ACHING;BURNING

## 2019-07-29 NOTE — PROGRESS NOTES
Discharge criteria met. Patient alert and oriented x3  Post procedure dressing dry and intact. Sensory and motor function intact as per pre-procedure. Instructions and follow up reviewed with pt at patient at discharge. Patient discharged ambulatory @ 9:16am          Patient discharged per wheelchair accompanied by volunteer and .

## 2019-07-29 NOTE — PROCEDURES
Pre-Procedure Note    Patient Name: Tona Ray   YOB: 1950  Room/Bed: Room/bed info not found  Medical Record Number: [id number]  Date: 7/29/2019       Indication:    1. Sacroiliitis ([facility] [address]        Consent: On file. Vital Signs:   Vitals:    07/29/19 0848   BP: (!) 120/54   Pulse: 65   Resp: 14   Temp:    SpO2: 96%       Past Medical History:   has a past medical history of Allergic rhinitis, cause unspecified, Back pain, Bowel obstruction ([facility] [address] CAD (coronary artery disease), Cellulitis, Cellulitis, Diverticulosis of colon (without mention of hemorrhage), GERD (gastroesophageal reflux disease), History of MI (myocardial infarction), History of ovarian cyst, History of peritonitis, HTN (hypertension), Hx of blood clots, Hyperlipidemia, Intestinal or peritoneal adhesions with obstruction (postoperative) (postinfection) ([facility] [address] Kidney infection, Lateral epicondylitis  of elbow, Muscle strain, Other abnormal glucose, Restless legs syndrome (RLS), Vitamin D deficiency, and Wears glasses. Past Surgical History:   has a past surgical history that includes Cardiac catheterization; Ovary removal (1970); colectomy (1969); Appendectomy (1968); Ovary removal (1971); Hysterectomy (1973); Abdomen surgery (1976); Cardiac catheterization (2005); Bunionectomy (Left); sinus surgery (2004); Colonoscopy; other surgical history (8/14/14); cyst removal (Right); Wrist fracture surgery (Left, 3/5/2019); and Upper gastrointestinal endoscopy (N/A, 5/31/2019). Pre-Sedation Documentation and Exam:   Vital signs have been reviewed (see flow sheet for vitals). Mallampati Airway Assessment:  normal    ASA Classification:  Class 3 - A patient with severe systemic disease that limits activity but is not incapacitating    Sedation/ Anesthesia Plan:   intravenous sedation  as needed. Medications Planned:   midazolam (Versed) / Fentanyl  Intravenously  as needed.     Patient is an appropriate candidate for plan of sedation:

## 2019-07-30 ENCOUNTER — TELEPHONE (OUTPATIENT)
Dept: PAIN MANAGEMENT | Age: 69
End: 2019-07-30

## 2019-07-31 ENCOUNTER — NURSE ONLY (OUTPATIENT)
Dept: INTERNAL MEDICINE CLINIC | Age: 69
End: 2019-07-31
Payer: MEDICARE

## 2019-07-31 DIAGNOSIS — E53.8 B12 DEFICIENCY: Primary | ICD-10-CM

## 2019-07-31 PROCEDURE — 96372 THER/PROPH/DIAG INJ SC/IM: CPT | Performed by: PHYSICIAN ASSISTANT

## 2019-07-31 RX ORDER — CYANOCOBALAMIN 1000 UG/ML
1000 INJECTION INTRAMUSCULAR; SUBCUTANEOUS ONCE
Status: COMPLETED | OUTPATIENT
Start: 2019-07-31 | End: 2019-07-31

## 2019-07-31 RX ADMIN — CYANOCOBALAMIN 1000 MCG: 1000 INJECTION INTRAMUSCULAR; SUBCUTANEOUS at 13:05

## 2019-08-01 ENCOUNTER — TELEPHONE (OUTPATIENT)
Dept: PAIN MANAGEMENT | Age: 69
End: 2019-08-01

## 2019-08-03 ENCOUNTER — HOSPITAL ENCOUNTER (INPATIENT)
Age: 69
LOS: 4 days | Discharge: HOME | DRG: 378 | End: 2019-08-07
Attending: EMERGENCY MEDICINE | Admitting: INTERNAL MEDICINE
Payer: MEDICARE

## 2019-08-03 ENCOUNTER — APPOINTMENT (OUTPATIENT)
Dept: CT IMAGING | Age: 69
DRG: 378 | End: 2019-08-03
Payer: MEDICARE

## 2019-08-03 DIAGNOSIS — M54.32 SCIATICA OF LEFT SIDE: ICD-10-CM

## 2019-08-03 DIAGNOSIS — K92.2 GASTROINTESTINAL HEMORRHAGE, UNSPECIFIED GASTROINTESTINAL HEMORRHAGE TYPE: Primary | ICD-10-CM

## 2019-08-03 LAB
-: ABNORMAL
ABSOLUTE EOS #: 0 K/UL (ref 0–0.4)
ABSOLUTE IMMATURE GRANULOCYTE: ABNORMAL K/UL (ref 0–0.3)
ABSOLUTE LYMPH #: 1.6 K/UL (ref 1–4.8)
ABSOLUTE MONO #: 0.9 K/UL (ref 0.1–1.3)
ALBUMIN SERPL-MCNC: 3.9 G/DL (ref 3.5–5.2)
ALBUMIN/GLOBULIN RATIO: ABNORMAL (ref 1–2.5)
ALP BLD-CCNC: 30 U/L (ref 35–104)
ALT SERPL-CCNC: 8 U/L (ref 5–33)
AMORPHOUS: ABNORMAL
ANION GAP SERPL CALCULATED.3IONS-SCNC: 12 MMOL/L (ref 9–17)
AST SERPL-CCNC: 9 U/L
BACTERIA: ABNORMAL
BASOPHILS # BLD: 0 % (ref 0–2)
BASOPHILS ABSOLUTE: 0 K/UL (ref 0–0.2)
BILIRUB SERPL-MCNC: 0.34 MG/DL (ref 0.3–1.2)
BILIRUBIN URINE: NEGATIVE
BUN BLDV-MCNC: 69 MG/DL (ref 8–23)
BUN/CREAT BLD: ABNORMAL (ref 9–20)
CALCIUM SERPL-MCNC: 8.9 MG/DL (ref 8.6–10.4)
CASTS UA: ABNORMAL /LPF
CHLORIDE BLD-SCNC: 109 MMOL/L (ref 98–107)
CO2: 20 MMOL/L (ref 20–31)
COLOR: YELLOW
COMMENT UA: ABNORMAL
CREAT SERPL-MCNC: 1.23 MG/DL (ref 0.5–0.9)
CRYSTALS, UA: ABNORMAL /HPF
DIFFERENTIAL TYPE: ABNORMAL
EOSINOPHILS RELATIVE PERCENT: 0 % (ref 0–4)
EPITHELIAL CELLS UA: ABNORMAL /HPF
GFR AFRICAN AMERICAN: 53 ML/MIN
GFR NON-AFRICAN AMERICAN: 43 ML/MIN
GFR SERPL CREATININE-BSD FRML MDRD: ABNORMAL ML/MIN/{1.73_M2}
GFR SERPL CREATININE-BSD FRML MDRD: ABNORMAL ML/MIN/{1.73_M2}
GLUCOSE BLD-MCNC: 110 MG/DL (ref 70–99)
GLUCOSE URINE: NEGATIVE
HCT VFR BLD CALC: 25.2 % (ref 36–46)
HEMOGLOBIN: 8.3 G/DL (ref 12–16)
IMMATURE GRANULOCYTES: ABNORMAL %
INR BLD: 1.1
KETONES, URINE: NEGATIVE
LACTIC ACID: 0.7 MMOL/L (ref 0.5–2.2)
LEUKOCYTE ESTERASE, URINE: ABNORMAL
LIPASE: 21 U/L (ref 13–60)
LYMPHOCYTES # BLD: 13 % (ref 24–44)
MCH RBC QN AUTO: 31.7 PG (ref 26–34)
MCHC RBC AUTO-ENTMCNC: 33 G/DL (ref 31–37)
MCV RBC AUTO: 96.1 FL (ref 80–100)
MONOCYTES # BLD: 7 % (ref 1–7)
MUCUS: ABNORMAL
NITRITE, URINE: NEGATIVE
NRBC AUTOMATED: ABNORMAL PER 100 WBC
OTHER OBSERVATIONS UA: ABNORMAL
PARTIAL THROMBOPLASTIN TIME: 28.9 SEC (ref 24–36)
PDW BLD-RTO: 13.7 % (ref 11.5–14.9)
PH UA: 5.5 (ref 5–8)
PLATELET # BLD: 307 K/UL (ref 150–450)
PLATELET ESTIMATE: ABNORMAL
PMV BLD AUTO: 7.8 FL (ref 6–12)
POTASSIUM SERPL-SCNC: 4.7 MMOL/L (ref 3.7–5.3)
PROTEIN UA: NEGATIVE
PROTHROMBIN TIME: 13.9 SEC (ref 11.8–14.6)
RBC # BLD: 2.62 M/UL (ref 4–5.2)
RBC # BLD: ABNORMAL 10*6/UL
RBC UA: ABNORMAL /HPF
RENAL EPITHELIAL, UA: ABNORMAL /HPF
SEG NEUTROPHILS: 80 % (ref 36–66)
SEGMENTED NEUTROPHILS ABSOLUTE COUNT: 10 K/UL (ref 1.3–9.1)
SODIUM BLD-SCNC: 141 MMOL/L (ref 135–144)
SPECIFIC GRAVITY UA: 1.02 (ref 1–1.03)
TOTAL PROTEIN: 6.3 G/DL (ref 6.4–8.3)
TRICHOMONAS: ABNORMAL
TURBIDITY: CLEAR
URINE HGB: NEGATIVE
UROBILINOGEN, URINE: NORMAL
WBC # BLD: 12.5 K/UL (ref 3.5–11)
WBC # BLD: ABNORMAL 10*3/UL
WBC UA: ABNORMAL /HPF
YEAST: ABNORMAL

## 2019-08-03 PROCEDURE — 96374 THER/PROPH/DIAG INJ IV PUSH: CPT

## 2019-08-03 PROCEDURE — 99223 1ST HOSP IP/OBS HIGH 75: CPT | Performed by: INTERNAL MEDICINE

## 2019-08-03 PROCEDURE — 2580000003 HC RX 258

## 2019-08-03 PROCEDURE — 6370000000 HC RX 637 (ALT 250 FOR IP)

## 2019-08-03 PROCEDURE — C9113 INJ PANTOPRAZOLE SODIUM, VIA: HCPCS | Performed by: STUDENT IN AN ORGANIZED HEALTH CARE EDUCATION/TRAINING PROGRAM

## 2019-08-03 PROCEDURE — 83690 ASSAY OF LIPASE: CPT

## 2019-08-03 PROCEDURE — 2580000003 HC RX 258: Performed by: EMERGENCY MEDICINE

## 2019-08-03 PROCEDURE — 85730 THROMBOPLASTIN TIME PARTIAL: CPT

## 2019-08-03 PROCEDURE — 81001 URINALYSIS AUTO W/SCOPE: CPT

## 2019-08-03 PROCEDURE — 2060000000 HC ICU INTERMEDIATE R&B

## 2019-08-03 PROCEDURE — 93971 EXTREMITY STUDY: CPT

## 2019-08-03 PROCEDURE — 6360000004 HC RX CONTRAST MEDICATION: Performed by: EMERGENCY MEDICINE

## 2019-08-03 PROCEDURE — 99285 EMERGENCY DEPT VISIT HI MDM: CPT

## 2019-08-03 PROCEDURE — 36415 COLL VENOUS BLD VENIPUNCTURE: CPT

## 2019-08-03 PROCEDURE — 2580000003 HC RX 258: Performed by: STUDENT IN AN ORGANIZED HEALTH CARE EDUCATION/TRAINING PROGRAM

## 2019-08-03 PROCEDURE — 80053 COMPREHEN METABOLIC PANEL: CPT

## 2019-08-03 PROCEDURE — 96375 TX/PRO/DX INJ NEW DRUG ADDON: CPT

## 2019-08-03 PROCEDURE — 83605 ASSAY OF LACTIC ACID: CPT

## 2019-08-03 PROCEDURE — 85025 COMPLETE CBC W/AUTO DIFF WBC: CPT

## 2019-08-03 PROCEDURE — 6370000000 HC RX 637 (ALT 250 FOR IP): Performed by: STUDENT IN AN ORGANIZED HEALTH CARE EDUCATION/TRAINING PROGRAM

## 2019-08-03 PROCEDURE — 74174 CTA ABD&PLVS W/CONTRAST: CPT

## 2019-08-03 PROCEDURE — 85610 PROTHROMBIN TIME: CPT

## 2019-08-03 PROCEDURE — 6360000002 HC RX W HCPCS: Performed by: STUDENT IN AN ORGANIZED HEALTH CARE EDUCATION/TRAINING PROGRAM

## 2019-08-03 PROCEDURE — 87086 URINE CULTURE/COLONY COUNT: CPT

## 2019-08-03 RX ORDER — LISINOPRIL 10 MG/1
10 TABLET ORAL DAILY
Status: DISCONTINUED | OUTPATIENT
Start: 2019-08-04 | End: 2019-08-07 | Stop reason: HOSPADM

## 2019-08-03 RX ORDER — 0.9 % SODIUM CHLORIDE 0.9 %
500 INTRAVENOUS SOLUTION INTRAVENOUS ONCE
Status: COMPLETED | OUTPATIENT
Start: 2019-08-03 | End: 2019-08-03

## 2019-08-03 RX ORDER — OXYCODONE HYDROCHLORIDE AND ACETAMINOPHEN 5; 325 MG/1; MG/1
1 TABLET ORAL 2 TIMES DAILY PRN
Status: DISCONTINUED | OUTPATIENT
Start: 2019-08-03 | End: 2019-08-07 | Stop reason: HOSPADM

## 2019-08-03 RX ORDER — PRAMIPEXOLE DIHYDROCHLORIDE 1.5 MG/1
1.5 TABLET ORAL DAILY
Status: DISCONTINUED | OUTPATIENT
Start: 2019-08-04 | End: 2019-08-07 | Stop reason: HOSPADM

## 2019-08-03 RX ORDER — SODIUM CHLORIDE 0.9 % (FLUSH) 0.9 %
10 SYRINGE (ML) INJECTION PRN
Status: DISCONTINUED | OUTPATIENT
Start: 2019-08-03 | End: 2019-08-07 | Stop reason: HOSPADM

## 2019-08-03 RX ORDER — SPIRONOLACTONE 25 MG/1
25 TABLET ORAL DAILY
Status: DISCONTINUED | OUTPATIENT
Start: 2019-08-04 | End: 2019-08-06

## 2019-08-03 RX ORDER — ONDANSETRON 2 MG/ML
4 INJECTION INTRAMUSCULAR; INTRAVENOUS EVERY 6 HOURS PRN
Status: DISCONTINUED | OUTPATIENT
Start: 2019-08-03 | End: 2019-08-07 | Stop reason: HOSPADM

## 2019-08-03 RX ORDER — LIDOCAINE 4 G/G
1 PATCH TOPICAL DAILY
Status: DISCONTINUED | OUTPATIENT
Start: 2019-08-03 | End: 2019-08-07 | Stop reason: HOSPADM

## 2019-08-03 RX ORDER — FENTANYL CITRATE 50 UG/ML
50 INJECTION, SOLUTION INTRAMUSCULAR; INTRAVENOUS ONCE
Status: COMPLETED | OUTPATIENT
Start: 2019-08-03 | End: 2019-08-03

## 2019-08-03 RX ORDER — NITROGLYCERIN 0.4 MG/1
0.4 TABLET SUBLINGUAL EVERY 5 MIN PRN
Status: DISCONTINUED | OUTPATIENT
Start: 2019-08-03 | End: 2019-08-07 | Stop reason: HOSPADM

## 2019-08-03 RX ORDER — ATORVASTATIN CALCIUM 80 MG/1
80 TABLET, FILM COATED ORAL NIGHTLY
Status: DISCONTINUED | OUTPATIENT
Start: 2019-08-03 | End: 2019-08-07 | Stop reason: HOSPADM

## 2019-08-03 RX ORDER — SODIUM CHLORIDE 9 MG/ML
INJECTION, SOLUTION INTRAVENOUS CONTINUOUS
Status: DISCONTINUED | OUTPATIENT
Start: 2019-08-03 | End: 2019-08-06

## 2019-08-03 RX ORDER — GABAPENTIN 300 MG/1
300 CAPSULE ORAL NIGHTLY
Status: DISCONTINUED | OUTPATIENT
Start: 2019-08-03 | End: 2019-08-07 | Stop reason: HOSPADM

## 2019-08-03 RX ORDER — 0.9 % SODIUM CHLORIDE 0.9 %
80 INTRAVENOUS SOLUTION INTRAVENOUS ONCE
Status: COMPLETED | OUTPATIENT
Start: 2019-08-03 | End: 2019-08-03

## 2019-08-03 RX ORDER — CYCLOBENZAPRINE HCL 10 MG
10 TABLET ORAL 3 TIMES DAILY PRN
Status: DISCONTINUED | OUTPATIENT
Start: 2019-08-03 | End: 2019-08-07 | Stop reason: HOSPADM

## 2019-08-03 RX ORDER — CARVEDILOL 12.5 MG/1
12.5 TABLET ORAL 2 TIMES DAILY
Status: DISCONTINUED | OUTPATIENT
Start: 2019-08-03 | End: 2019-08-07 | Stop reason: HOSPADM

## 2019-08-03 RX ORDER — CITALOPRAM 20 MG/1
10 TABLET ORAL DAILY
Status: DISCONTINUED | OUTPATIENT
Start: 2019-08-04 | End: 2019-08-07 | Stop reason: HOSPADM

## 2019-08-03 RX ORDER — FUROSEMIDE 20 MG/1
20 TABLET ORAL DAILY
Status: DISCONTINUED | OUTPATIENT
Start: 2019-08-04 | End: 2019-08-07 | Stop reason: HOSPADM

## 2019-08-03 RX ORDER — AMLODIPINE BESYLATE 5 MG/1
10 TABLET ORAL DAILY
Status: DISCONTINUED | OUTPATIENT
Start: 2019-08-04 | End: 2019-08-07 | Stop reason: HOSPADM

## 2019-08-03 RX ORDER — ACETAMINOPHEN 325 MG/1
650 TABLET ORAL EVERY 4 HOURS PRN
Status: DISCONTINUED | OUTPATIENT
Start: 2019-08-03 | End: 2019-08-07 | Stop reason: HOSPADM

## 2019-08-03 RX ADMIN — OXYCODONE HYDROCHLORIDE AND ACETAMINOPHEN 1 TABLET: 5; 325 TABLET ORAL at 21:08

## 2019-08-03 RX ADMIN — FENTANYL CITRATE 50 MCG: 50 INJECTION, SOLUTION INTRAMUSCULAR; INTRAVENOUS at 17:13

## 2019-08-03 RX ADMIN — SODIUM CHLORIDE 500 ML: 9 INJECTION, SOLUTION INTRAVENOUS at 17:13

## 2019-08-03 RX ADMIN — SODIUM CHLORIDE 80 ML: 9 INJECTION, SOLUTION INTRAVENOUS at 18:14

## 2019-08-03 RX ADMIN — Medication 10 ML: at 18:14

## 2019-08-03 RX ADMIN — IOVERSOL 75 ML: 741 INJECTION INTRA-ARTERIAL; INTRAVENOUS at 18:14

## 2019-08-03 RX ADMIN — SODIUM CHLORIDE: 9 INJECTION, SOLUTION INTRAVENOUS at 21:08

## 2019-08-03 RX ADMIN — SODIUM CHLORIDE 8 MG/HR: 9 INJECTION, SOLUTION INTRAVENOUS at 17:51

## 2019-08-03 ASSESSMENT — ENCOUNTER SYMPTOMS
BACK PAIN: 1
RHINORRHEA: 0
CONSTIPATION: 1
EYE REDNESS: 0
NAUSEA: 1
DIARRHEA: 0
ANAL BLEEDING: 1
BLOOD IN STOOL: 1
VOMITING: 0
WHEEZING: 0
CHEST TIGHTNESS: 0
SORE THROAT: 0
SHORTNESS OF BREATH: 0
ABDOMINAL PAIN: 1
PHOTOPHOBIA: 0
COLOR CHANGE: 0
COUGH: 0
EYE PAIN: 0

## 2019-08-03 ASSESSMENT — PAIN SCALES - GENERAL
PAINLEVEL_OUTOF10: 5
PAINLEVEL_OUTOF10: 0
PAINLEVEL_OUTOF10: 10

## 2019-08-03 ASSESSMENT — PAIN DESCRIPTION - LOCATION: LOCATION: LEG;ABDOMEN

## 2019-08-03 ASSESSMENT — PAIN DESCRIPTION - PAIN TYPE: TYPE: ACUTE PAIN

## 2019-08-03 NOTE — ED PROVIDER NOTES
[address] [facility] ED  Emergency Department Encounter  Emergency Medicine Resident     Pt Name: Herve Mora  MRN: [id number]  [place] 1950  Date ofevaluation: 8/3/19  PCP:  Minerva Chan MD    [address] [place] [address]       Chief Complaint   Patient presents with    Abdominal Pain    Leg Pain     HISTORY OF PRESENT ILLNESS  (Location/Symptom, Timing/Onset, Context/Setting, Quality, Duration, Modifying Factors, Severity, Associated signs/symptoms)     Herve Mora is a 77 y.o. female who presents acute onset of diffuse abdominal pain and left lower back/leg pain. Patient reports that she has had problems with her back for many months now, but her pain worsened over the last several weeks after she got a an injection in her SI joint. She states that the pain is consistent with her prior back pain but now rates down to the level of the knee which is new. No saddle anesthesia, no bowel or bladder incontinence, no history of IV drug abuse or back surgeries. Currently rates her pain 10/10 in her back. No new weakness in her legs. She also reports some diffuse abdominal pain that began several days ago and has been consistent since onset. She currently rates his pain 4/10. She also does report some melena over the last several days. Does a history of GERD and states that her edition saw some erosions in her esophagus and a prior EGD. Has been compliant with her PPI, states that usually helps with her heartburn. Also reports of bright red blood per rectum from her hemorrhoids. Associate symptoms include some urinary frequency, and subjective chills. No fevers, chest pain, shortness of breath, vaginal bleeding or discharge, hematuria, dysuria, headaches, rashes or other concerns. No history of abdominal surgeries. Of note, patient recently had an EGD performed which did not show any ulcers.     PAST MEDICAL / SURGICAL / SOCIAL / FAMILY HISTORY      has a past medical history of Allergic rhinitis, tablet Take 1 tablet by mouth daily 6/27/19   Chiara Green MD   lisinopril (PRINIVIL;ZESTRIL) 10 MG tablet Take 1 tablet by mouth daily 6/27/19   Chiara Green MD   aspirin EC 81 MG EC tablet Take 1 tablet by mouth daily 6/11/19   MAIK Salazar CNP   glucose monitoring kit (FREESTYLE) monitoring kit 1 kit by Does not apply route daily 6/11/19   MAIK Salazar CNP   cholestyramine Jacqui Otto) 4 g packet Take 1 packet by mouth 2 times daily 5/24/19   Chiara Green MD   SITagliptin (JANUVIA) 100 MG tablet Take 1 tablet by mouth daily 5/24/19   Chiara Green MD   acetaminophen (TYLENOL) 500 MG tablet Take 500 mg by mouth every 6 hours as needed for Pain    Historical Provider, MD   fenofibrate micronized (LOFIBRA) 134 MG capsule Take 1 capsule by mouth every morning (before breakfast) 3/28/19   Chiara Green MD   spironolactone (ALDACTONE) 25 MG tablet Take 1 tablet by mouth daily 3/22/19   Chiara Green MD   gabapentin (NEURONTIN) 300 MG capsule Take 1 capsule by mouth nightly for 180 days. 3/22/19 9/18/19  Branden Schwartz MD   cyanocobalamin 1000 MCG/ML injection Inject 1 mL into the muscle once for 1 dose 3/18/19 7/17/19  Branden Schwartz MD   lidocaine (XYLOCAINE) 5 % ointment 4-5 times a day to your right thigh for pain.  3/15/19   Stephanie Ellis MD   furosemide (LASIX) 40 MG tablet Take 1 tablet by mouth daily  Patient taking differently: Take 20 mg by mouth daily  2/19/19   Chiara Green MD   pramipexole (MIRAPEX) 1 MG tablet Take 1.5 tablets by mouth daily 2/8/19   Chiara Green MD   atorvastatin (LIPITOR) 80 MG tablet Take 1 tablet by mouth nightly 2/8/19   Chiara Green MD   hydrALAZINE (APRESOLINE) 100 MG tablet Take 1 tablet by mouth every 8 hours 1/22/19   Chiara Green MD   nitroGLYCERIN (NITROSTAT) 0.4 MG SL tablet Place 1 tablet under the tongue every 5 minutes as needed for Chest pain 1/8/18   Chiara Green MD   vitamin D (CHOLECALCIFEROL) 1000 UNIT TABS tablet Take 1 tablet by mouth daily 1/8/18   Jose Fleming MD   cyclobenzaprine (FLEXERIL) 10 MG tablet Take 1 tablet by mouth 3 times daily as needed for Muscle spasms 1/8/18   Jose Fleming MD   Calcium Carbonate-Vitamin D (CALCIUM 500/D) 500-125 MG-UNIT TABS Take 1 tablet by mouth 2 times daily     Historical Provider, MD       REVIEW OF SYSTEMS    (2-9 systems for level 4, 10 or more for level 5)      Review of Systems   Constitutional: Positive for chills. Negative for fever. HENT: Negative for rhinorrhea and sore throat. Eyes: Negative for pain and redness. Respiratory: Negative for cough and shortness of breath. Cardiovascular: Negative for chest pain and palpitations. Gastrointestinal: Positive for abdominal pain, anal bleeding, blood in stool, constipation and nausea. Negative for diarrhea and vomiting. Genitourinary: Positive for frequency. Negative for dysuria, hematuria, vaginal bleeding and vaginal discharge. Musculoskeletal: Positive for back pain. Positive for leg pain   Skin: Negative for rash and wound. Neurological: Positive for weakness. Negative for numbness and headaches. PHYSICAL EXAM   (up to 7 for level 4, 8 or more for level 5)      INITIAL VITALS:   /78   Pulse 79   Temp 98.4 °F (36.9 °C) (Oral)   Resp 18   Ht 5' 3\" (1.6 m)   Wt 170 lb (77.1 kg)   SpO2 99%   BMI 30.11 kg/m²     Physical Exam   Constitutional: She is oriented to person, place, and time. She appears well-developed and well-nourished. No distress. HENT:   Head: Normocephalic. Eyes: Conjunctivae are normal. No scleral icterus. Neck: Neck supple. Cardiovascular: Normal rate and regular rhythm. Exam reveals no gallop and no friction rub. No murmur heard. Pulses:       Dorsalis pedis pulses are 2+ on the right side, and 2+ on the left side. Pulmonary/Chest: Effort normal. No stridor. No respiratory distress. She has no wheezes. She has no rales. Abdominal: Soft.  She exhibits no (COREG) tablet 12.5 mg    citalopram (CELEXA) tablet 10 mg    cyclobenzaprine (FLEXERIL) tablet 10 mg    furosemide (LASIX) tablet 20 mg    gabapentin (NEURONTIN) capsule 300 mg    lisinopril (PRINIVIL;ZESTRIL) tablet 10 mg    nitroGLYCERIN (NITROSTAT) SL tablet 0.4 mg    oxyCODONE-acetaminophen (PERCOCET) 5-325 MG per tablet 1 tablet    pramipexole (MIRAPEX) tablet 1.5 mg    spironolactone (ALDACTONE) tablet 25 mg    acetaminophen (TYLENOL) tablet 650 mg    ondansetron (ZOFRAN) injection 4 mg    0.9 % sodium chloride infusion    DISCONTD: pantoprazole (PROTONIX) 80 mg in sodium chloride 0.9 % 100 mL infusion    piperacillin-tazobactam (ZOSYN) 3.375 g in dextrose 5 % 50 mL IVPB (mini-bag)     DIAGNOSTIC RESULTS / EMERGENCYDEPARTMENT COURSE / MDM     LABS:  Results for orders placed or performed during the hospital encounter of 08/03/19   CBC Auto Differential   Result Value Ref Range    WBC 12.5 (H) 3.5 - 11.0 k/uL    RBC 2.62 (L) 4.0 - 5.2 m/uL    Hemoglobin 8.3 (L) 12.0 - 16.0 g/dL    Hematocrit 25.2 (L) 36 - 46 %    MCV 96.1 80 - 100 fL    MCH 31.7 26 - 34 pg    MCHC 33.0 31 - 37 g/dL    RDW 13.7 11.5 - 14.9 %    Platelets 092 663 - 549 k/uL    MPV 7.8 6.0 - 12.0 fL    NRBC Automated NOT REPORTED per 100 WBC    Differential Type NOT REPORTED     Seg Neutrophils 80 (H) 36 - 66 %    Lymphocytes 13 (L) 24 - 44 %    Monocytes 7 1 - 7 %    Eosinophils % 0 0 - 4 %    Basophils 0 0 - 2 %    Immature Granulocytes NOT REPORTED 0 %    Segs Absolute 10.00 (H) 1.3 - 9.1 k/uL    Absolute Lymph # 1.60 1.0 - 4.8 k/uL    Absolute Mono # 0.90 0.1 - 1.3 k/uL    Absolute Eos # 0.00 0.0 - 0.4 k/uL    Basophils # 0.00 0.0 - 0.2 k/uL    Absolute Immature Granulocyte NOT REPORTED 0.00 - 0.30 k/uL    WBC Morphology NOT REPORTED     RBC Morphology NOT REPORTED     Platelet Estimate NOT REPORTED    Comprehensive Metabolic Panel   Result Value Ref Range    Glucose 110 (H) 70 - 99 mg/dL    BUN 69 (H) 8 - 23 mg/dL    CREATININE 1.23 (H) 0.50 - 0.90 mg/dL    Bun/Cre Ratio NOT REPORTED 9 - 20    Calcium 8.9 8.6 - 10.4 mg/dL    Sodium 141 135 - 144 mmol/L    Potassium 4.7 3.7 - 5.3 mmol/L    Chloride 109 (H) 98 - 107 mmol/L    CO2 20 20 - 31 mmol/L    Anion Gap 12 9 - 17 mmol/L    Alkaline Phosphatase 30 (L) 35 - 104 U/L    ALT 8 5 - 33 U/L    AST 9 <32 U/L    Total Bilirubin 0.34 0.3 - 1.2 mg/dL    Total Protein 6.3 (L) 6.4 - 8.3 g/dL    Alb 3.9 3.5 - 5.2 g/dL    Albumin/Globulin Ratio NOT REPORTED 1.0 - 2.5    GFR Non-African American 43 (L) >60 mL/min    GFR  53 (L) >60 mL/min    GFR Comment          GFR Staging NOT REPORTED    Lipase   Result Value Ref Range    Lipase 21 13 - 60 U/L   Urinalysis with Microscopic   Result Value Ref Range    Color, UA YELLOW YELLOW    Turbidity UA CLEAR CLEAR    Glucose, Ur NEGATIVE NEGATIVE    Bilirubin Urine NEGATIVE NEGATIVE    Ketones, Urine NEGATIVE NEGATIVE    Specific Gravity, UA 1.018 1.000 - 1.030    Urine Hgb NEGATIVE NEGATIVE    pH, UA 5.5 5.0 - 8.0    Protein, UA NEGATIVE NEGATIVE    Urobilinogen, Urine Normal Normal    Nitrite, Urine NEGATIVE NEGATIVE    Leukocyte Esterase, Urine TRACE (A) NEGATIVE    Urinalysis Comments NOT REPORTED     -          WBC, UA 0 TO 2 /HPF    RBC, UA 0 TO 2 /HPF    Casts UA NOT REPORTED /LPF    Crystals UA NOT REPORTED None /HPF    Epithelial Cells UA 0 TO 2 /HPF    Renal Epithelial, Urine NOT REPORTED 0 /HPF    Bacteria, UA FEW (A) None    Mucus, UA NOT REPORTED None    Trichomonas, UA NOT REPORTED None    Amorphous, UA NOT REPORTED None    Other Observations UA NOT REPORTED NOT REQ. Yeast, UA NOT REPORTED None   Lactic Acid   Result Value Ref Range    Lactic Acid 0.7 0.5 - 2.2 mmol/L   Protime-INR   Result Value Ref Range    Protime 13.9 11.8 - 14.6 sec    INR 1.1    APTT   Result Value Ref Range    PTT 28.9 24.0 - 36.0 sec       RADIOLOGY:  CTA ABDOMEN PELVIS W WO CONTRAST   Preliminary Result   1. No aortoiliac aneurysm or dissection.   No [facility]:  New Prescriptions    No medications on file       Jesus Rothman DO  Emergency Medicine Resident  [address] [facility]    (Please note that portions of this note were completed with a voice recognition program.  Efforts were made to edit thedictations but occasionally words are mis-transcribed.)     Jesus Rothman DO  Resident  08/03/19 [id number]

## 2019-08-03 NOTE — H&P
Diverticulosis of colon (without mention of hemorrhage)     GERD (gastroesophageal reflux disease)     History of MI (myocardial infarction) 2005    thought due to a blood clot    History of ovarian cyst 1970    had oopherectomy    History of peritonitis 1968    due to ruptured appendix age 13    HTN (hypertension)     Hx of blood clots     right leg    Hyperlipidemia     Intestinal or peritoneal adhesions with obstruction (postoperative) (postinfection) ([address]     Kidney infection     renal failure/sepsis/spider bite    Lateral epicondylitis  of elbow     Muscle strain     right posterior shoulder    Other abnormal glucose     Restless legs syndrome (RLS)     Vitamin D deficiency     Wears glasses         Past SurgicalHistory:     Past Surgical History:   Procedure Laterality Date    ABDOMEN SURGERY  1976    benign tumor removed near remaining overy, 1.5 pounds    APPENDECTOMY  1968    appendix ruptured, developed peritonitis    BUNIONECTOMY Left     along with calcium deposits removed   [address]  2005    negative    COLECTOMY  1969    12 INCHES REMOVED D/T OBSTRUCTION    COLONOSCOPY      CYST REMOVAL Right     right facial    HYSTERECTOMY  1973    taken as a result of recurring cysts    OTHER SURGICAL HISTORY  8/14/14    FESS    OVARY REMOVAL  1970    UNILATERAL due to cyst    OVARY REMOVAL  1971    partial, due to cyst    SINUS SURGERY  2004    UPPER GASTROINTESTINAL ENDOSCOPY N/A 5/31/2019    EGD ESOPHAGOGASTRODUODENOSCOPY performed by Semaj Zazueta MD at [facility] 3/5/2019    WRIST OPEN REDUCTION INTERNAL FIXATION performed by Akira Holm MD at [address]        Medications Prior to Admission:        Prior to Admission medications    Medication Sig Start Date End Date Taking?  Authorizing Provider   amLODIPine (NORVASC) 10 MG tablet Take 1 tablet by mouth daily 7/23/19   Cassidy Alamo MD   famotidine

## 2019-08-04 LAB
CULTURE: NORMAL
GLUCOSE BLD-MCNC: 108 MG/DL (ref 65–105)
GLUCOSE BLD-MCNC: 111 MG/DL (ref 65–105)
GLUCOSE BLD-MCNC: 113 MG/DL (ref 65–105)
HCT VFR BLD CALC: 20.1 % (ref 36–46)
HCT VFR BLD CALC: 22.6 % (ref 36–46)
HEMOGLOBIN: 6.5 G/DL (ref 12–16)
HEMOGLOBIN: 7.6 G/DL (ref 12–16)
LIPASE: 21 U/L (ref 13–60)
Lab: NORMAL
SPECIMEN DESCRIPTION: NORMAL

## 2019-08-04 PROCEDURE — 2580000003 HC RX 258: Performed by: STUDENT IN AN ORGANIZED HEALTH CARE EDUCATION/TRAINING PROGRAM

## 2019-08-04 PROCEDURE — 99232 SBSQ HOSP IP/OBS MODERATE 35: CPT | Performed by: INTERNAL MEDICINE

## 2019-08-04 PROCEDURE — 85018 HEMOGLOBIN: CPT

## 2019-08-04 PROCEDURE — 83690 ASSAY OF LIPASE: CPT

## 2019-08-04 PROCEDURE — 6370000000 HC RX 637 (ALT 250 FOR IP)

## 2019-08-04 PROCEDURE — 86920 COMPATIBILITY TEST SPIN: CPT

## 2019-08-04 PROCEDURE — 99222 1ST HOSP IP/OBS MODERATE 55: CPT | Performed by: INTERNAL MEDICINE

## 2019-08-04 PROCEDURE — P9016 RBC LEUKOCYTES REDUCED: HCPCS

## 2019-08-04 PROCEDURE — 36430 TRANSFUSION BLD/BLD COMPNT: CPT

## 2019-08-04 PROCEDURE — C9113 INJ PANTOPRAZOLE SODIUM, VIA: HCPCS | Performed by: STUDENT IN AN ORGANIZED HEALTH CARE EDUCATION/TRAINING PROGRAM

## 2019-08-04 PROCEDURE — 6360000002 HC RX W HCPCS: Performed by: STUDENT IN AN ORGANIZED HEALTH CARE EDUCATION/TRAINING PROGRAM

## 2019-08-04 PROCEDURE — 85014 HEMATOCRIT: CPT

## 2019-08-04 PROCEDURE — 6370000000 HC RX 637 (ALT 250 FOR IP): Performed by: STUDENT IN AN ORGANIZED HEALTH CARE EDUCATION/TRAINING PROGRAM

## 2019-08-04 PROCEDURE — 2060000000 HC ICU INTERMEDIATE R&B

## 2019-08-04 PROCEDURE — 82947 ASSAY GLUCOSE BLOOD QUANT: CPT

## 2019-08-04 PROCEDURE — 97116 GAIT TRAINING THERAPY: CPT

## 2019-08-04 PROCEDURE — 86900 BLOOD TYPING SEROLOGIC ABO: CPT

## 2019-08-04 PROCEDURE — 2580000003 HC RX 258

## 2019-08-04 PROCEDURE — 97162 PT EVAL MOD COMPLEX 30 MIN: CPT

## 2019-08-04 PROCEDURE — 36415 COLL VENOUS BLD VENIPUNCTURE: CPT

## 2019-08-04 PROCEDURE — 86850 RBC ANTIBODY SCREEN: CPT

## 2019-08-04 PROCEDURE — 86901 BLOOD TYPING SEROLOGIC RH(D): CPT

## 2019-08-04 RX ORDER — SODIUM CHLORIDE 0.9 % (FLUSH) 0.9 %
10 SYRINGE (ML) INJECTION EVERY 12 HOURS SCHEDULED
Status: DISCONTINUED | OUTPATIENT
Start: 2019-08-04 | End: 2019-08-07 | Stop reason: HOSPADM

## 2019-08-04 RX ORDER — SODIUM CHLORIDE 0.9 % (FLUSH) 0.9 %
10 SYRINGE (ML) INJECTION PRN
Status: DISCONTINUED | OUTPATIENT
Start: 2019-08-04 | End: 2019-08-07 | Stop reason: HOSPADM

## 2019-08-04 RX ADMIN — GABAPENTIN 300 MG: 300 CAPSULE ORAL at 20:10

## 2019-08-04 RX ADMIN — AMLODIPINE BESYLATE 10 MG: 5 TABLET ORAL at 10:43

## 2019-08-04 RX ADMIN — PIPERACILLIN SODIUM AND TAZOBACTAM SODIUM 3.38 G: 3; .375 INJECTION, POWDER, LYOPHILIZED, FOR SOLUTION INTRAVENOUS at 06:00

## 2019-08-04 RX ADMIN — CARVEDILOL 12.5 MG: 12.5 TABLET, FILM COATED ORAL at 10:43

## 2019-08-04 RX ADMIN — SPIRONOLACTONE 25 MG: 25 TABLET, FILM COATED ORAL at 10:44

## 2019-08-04 RX ADMIN — PIPERACILLIN SODIUM AND TAZOBACTAM SODIUM 3.38 G: 3; .375 INJECTION, POWDER, LYOPHILIZED, FOR SOLUTION INTRAVENOUS at 20:10

## 2019-08-04 RX ADMIN — CARVEDILOL 12.5 MG: 12.5 TABLET, FILM COATED ORAL at 20:10

## 2019-08-04 RX ADMIN — OXYCODONE HYDROCHLORIDE AND ACETAMINOPHEN 1 TABLET: 5; 325 TABLET ORAL at 17:11

## 2019-08-04 RX ADMIN — PIPERACILLIN SODIUM AND TAZOBACTAM SODIUM 3.38 G: 3; .375 INJECTION, POWDER, LYOPHILIZED, FOR SOLUTION INTRAVENOUS at 11:02

## 2019-08-04 RX ADMIN — OXYCODONE HYDROCHLORIDE AND ACETAMINOPHEN 1 TABLET: 5; 325 TABLET ORAL at 06:38

## 2019-08-04 RX ADMIN — PRAMIPEXOLE DIHYDROCHLORIDE 1.5 MG: 1.5 TABLET ORAL at 10:45

## 2019-08-04 RX ADMIN — GABAPENTIN 300 MG: 300 CAPSULE ORAL at 00:30

## 2019-08-04 RX ADMIN — LISINOPRIL 10 MG: 10 TABLET ORAL at 10:44

## 2019-08-04 RX ADMIN — FUROSEMIDE 20 MG: 20 TABLET ORAL at 10:44

## 2019-08-04 RX ADMIN — CARVEDILOL 12.5 MG: 12.5 TABLET, FILM COATED ORAL at 00:30

## 2019-08-04 RX ADMIN — ATORVASTATIN CALCIUM 80 MG: 80 TABLET, FILM COATED ORAL at 00:30

## 2019-08-04 RX ADMIN — ATORVASTATIN CALCIUM 80 MG: 80 TABLET, FILM COATED ORAL at 20:10

## 2019-08-04 RX ADMIN — CITALOPRAM HYDROBROMIDE 10 MG: 20 TABLET ORAL at 10:44

## 2019-08-04 RX ADMIN — PIPERACILLIN SODIUM AND TAZOBACTAM SODIUM 3.38 G: 3; .375 INJECTION, POWDER, LYOPHILIZED, FOR SOLUTION INTRAVENOUS at 00:31

## 2019-08-04 RX ADMIN — SODIUM CHLORIDE: 9 INJECTION, SOLUTION INTRAVENOUS at 11:02

## 2019-08-04 RX ADMIN — SODIUM CHLORIDE 8 MG/HR: 9 INJECTION, SOLUTION INTRAVENOUS at 15:28

## 2019-08-04 ASSESSMENT — PAIN DESCRIPTION - ONSET: ONSET: ON-GOING

## 2019-08-04 ASSESSMENT — ENCOUNTER SYMPTOMS
ABDOMINAL PAIN: 0
BACK PAIN: 1
SHORTNESS OF BREATH: 0
BLOOD IN STOOL: 1
EYES NEGATIVE: 1
COLOR CHANGE: 0
VOICE CHANGE: 0
APNEA: 0
SORE THROAT: 0
PHOTOPHOBIA: 0
CHOKING: 0
BACK PAIN: 0
COUGH: 0
WHEEZING: 0
TROUBLE SWALLOWING: 0
VOMITING: 0
DIARRHEA: 0
NAUSEA: 1

## 2019-08-04 ASSESSMENT — PAIN SCALES - GENERAL
PAINLEVEL_OUTOF10: 4
PAINLEVEL_OUTOF10: 6
PAINLEVEL_OUTOF10: 4
PAINLEVEL_OUTOF10: 0

## 2019-08-04 ASSESSMENT — PAIN DESCRIPTION - DESCRIPTORS: DESCRIPTORS: DISCOMFORT;ACHING

## 2019-08-04 ASSESSMENT — PAIN DESCRIPTION - LOCATION: LOCATION: BACK

## 2019-08-04 ASSESSMENT — PAIN DESCRIPTION - PROGRESSION: CLINICAL_PROGRESSION: NOT CHANGED

## 2019-08-04 ASSESSMENT — PAIN DESCRIPTION - ORIENTATION: ORIENTATION: LOWER

## 2019-08-04 ASSESSMENT — PAIN DESCRIPTION - FREQUENCY: FREQUENCY: INTERMITTENT

## 2019-08-04 ASSESSMENT — PAIN DESCRIPTION - PAIN TYPE: TYPE: CHRONIC PAIN

## 2019-08-04 NOTE — PROGRESS NOTES
RN stayed w/ the pt at the bedside for the initial 15 min of the PRBC transfusion. NO s/s of any reaction noted.  RN w/ continue to monitor per policy

## 2019-08-04 NOTE — ED NOTES
Pt ambulated to bedside commode with cane. Pt urinated clear yellow urine.       Epifanio Andrea RN  08/04/19 [id number]

## 2019-08-04 NOTE — CONSULTS
Heart Disease Maternal Grandmother     High Blood Pressure Sister        Review of Systems:     Review of Systems   Constitutional: Negative for appetite change, fatigue, fever and unexpected weight change. HENT: Negative for mouth sores, sore throat, trouble swallowing and voice change. Eyes: Negative. Respiratory: Negative for cough, choking, shortness of breath and wheezing. Cardiovascular: Negative for chest pain, palpitations and leg swelling. Gastrointestinal: Positive for blood in stool and nausea. Negative for abdominal pain, diarrhea and vomiting. Endocrine: Negative for polyphagia and polyuria. Genitourinary: Negative for difficulty urinating, frequency, hematuria, pelvic pain, urgency and vaginal bleeding. Musculoskeletal: Negative for arthralgias, back pain, gait problem and joint swelling. Skin: Negative for color change, pallor and rash. Allergic/Immunologic: Negative for food allergies. Neurological: Negative for dizziness, seizures, weakness, light-headedness and headaches. Hematological: Negative for adenopathy. Does not bruise/bleed easily. Psychiatric/Behavioral: Negative for sleep disturbance. The patient is not nervous/anxious. Code Status:  Full Code    Physical Exam:     Vitals:  BP (!) 122/42   Pulse 59   Temp 98.5 °F (36.9 °C) (Oral)   Resp 16   Ht 5' 3\" (1.6 m)   Wt 170 lb (77.1 kg)   SpO2 97%   BMI 30.11 kg/m²   Temp (24hrs), Av.2 °F (36.8 °C), Min:97.8 °F (36.6 °C), Max:98.5 °F (36.9 °C)      Physical Exam   Constitutional: She is oriented to person, place, and time. She appears well-developed and well-nourished. HENT:   Head: Normocephalic and atraumatic. No oral lesions   Eyes: Pupils are equal, round, and reactive to light. Conjunctivae are normal. No scleral icterus. Neck: Normal range of motion. Neck supple. No hepatojugular reflux and no JVD present. No tracheal deviation present. No thyromegaly present.    Cardiovascular: Normal

## 2019-08-04 NOTE — PROGRESS NOTES
[address].    PROGRESS NOTE             8/4/2019    9:46 AM    Name:   Prudencio Jimenez  MRN:     [id number]     Acct:      [de-identified]   Room:   [facility]  IP Day:  1  Admit Date:  8/3/2019  4:09 PM    PCP:  Leonila Jeffrey MD  Code Status:  Full Code    Subjective:     C/C:   Chief Complaint   Patient presents with    Abdominal Pain    Leg Pain     Interval History Status: improved. Patient was seen and examined at bedside this AM. Overnight patient's hgb dropped below 7. Consent form was signed and patient was given PRBC     Patient denied any abdominal pain, CP, SOB, fever, chills, nausea, vomiting or diarrhea. She reports general fatigue, numbness in her right proximal leg and and complains of pain behind her left knee. Patient denied any new complaints or concerns. Case was discussed with nursing staff. Zakia Skeans showed diverticulitis at the junction of descending and sigmoid colon but no active bleed from the GI tract. UA: trace leukocyte esterase and few bacteria  Review of Systems:     Review of Systems   Constitutional: Positive for fatigue. Negative for activity change and chills. Eyes: Negative for photophobia and visual disturbance. Respiratory: Negative for apnea, cough, shortness of breath and wheezing. Cardiovascular: Negative for chest pain and leg swelling. Gastrointestinal: Negative for abdominal pain. Genitourinary: Negative for dysuria, enuresis and urgency. Musculoskeletal: Positive for arthralgias and back pain. Neurological: Negative for dizziness, weakness and headaches. Psychiatric/Behavioral: Negative for agitation, behavioral problems, confusion, decreased concentration and dysphoric mood. Medications: Allergies:     Allergies   Allergen Reactions    Bactrim [Sulfamethoxazole-Trimethoprim] Other (See Comments)     sepsis    Codeine Itching    Seasonal  Heart Disease Brother     High Blood Pressure Brother     Heart Disease Maternal Grandmother     High Blood Pressure Sister        Vitals:  BP (!) 126/52   Pulse 66   Temp 97.9 °F (36.6 °C) (Oral)   Resp 16   Ht 5' 3\" (1.6 m)   Wt 170 lb (77.1 kg)   SpO2 99%   BMI 30.11 kg/m²   Temp (24hrs), Av °F (36.7 °C), Min:97.8 °F (36.6 °C), Max:98.4 °F (36.9 °C)    Recent Labs     19  0738   POCGLU 111*       I/O(24Hr): No intake or output data in the 24 hours ending 19 0946    Labs:    [unfilled]    Lab Results   Component Value Date/Time    SPECIAL NOT REPORTED 2018 10:27 PM     Lab Results   Component Value Date/Time    CULTURE GROUP D ENTEROCOCCUS 10 to 50,000 CFU/ML (A) 2018 10:27 PM    CULTURE  2018 10:27 PM     Performed at [address] [place] [address], [address] [place] [address] [phone]       [facility]    Radiology:    Dexa Bone Density Axial Skeleton    Result Date: 2019  EXAMINATION: BONE DENSITOMETRY 2019 10:21 am TECHNIQUE: A bone density dual x-ray absorptiometry (DEXA) scan was performed of the lumbar spine and left hip  on a [employer] system. COMPARISON: None. HISTORY: ORDERING SYSTEM PROVIDED HISTORY: Abnormal findings on diagnostic imaging of limbs FINDINGS: LUMBAR SPINE: The bone mineral density in the lumbar spine including the L1-L4 levels is measured at 1.144 g/cm2, which corresponds to a T-score of -0.4 and a Z-score of 0.8. This is within the normal range by WHO criteria. LEFT HIP: The bone mineral density in the total hip is measured at 0.869 g/cm2 corresponding to a T-score of -1.1 and a Z-score of -0.1. This is within the osteopenia range by WHO criteria. The bone mineral density of the femoral neck is measured at 0.714 g/cm2 corresponding to a T-score of -2.3 and a Z-score of -1.0. This is within the osteopenia range by WHO criteria.  WHO fracture risk assessment tool (FRAX) projects a 10-year fracture risk of a major osteoporotic She has a normal mood and affect. Her behavior is normal. Judgment and thought content normal.         Assessment:        Primary Problem  GI bleed    Active Hospital Problems    Diagnosis Date Noted    GI bleed [K92.2] 08/03/2019    Lumbar degenerative disc disease [M51.36]     Depression [F32.9] 06/26/2014    Essential hypertension [I10]        Plan:          GI bleed  - Last hgb 6.5, last Hct 20.1  -Transfuse RBC  -Type and screen  -PT, PTT, INR  -Daily cbc, bmp, H and H Q6H  -Lactic acid  -CTA of abdomen (Divurticulitis)   -Patient was started on IV Zosyn in the ED  -NPO  -NS fluids  -Protonix drip  -consult to GI    Back pain  -Flexeril  -Gabapentin  -Percocet     Depression  -Citalopram     HTN  -Amlodipine  -Carvidilol  -Furosemide  -Lisinopril  -Spironolactone      Plan will be discussed with the attending, Dr. Reford Severe, MD  PGY I Family Medicine Resident  8/4/2019 9:46 AM     Attending Physician Statement  I have discussed the care of Rebekah Bond and I have examined the patient myselft and taken ros and hpi , including pertinent history and exam findings,  with the resident. I have reviewed the key elements of all parts of the encounter with the resident. I agree with the assessment, plan and orders as documented by the resident.   Diverticular bleed history of colonoscopy a year ago polypectomy done transfuse hemoglobin less than 7 conservative treatment GI consulted    Electronically signed by Deloise Moritz, MD

## 2019-08-05 ENCOUNTER — ANESTHESIA EVENT (OUTPATIENT)
Dept: ENDOSCOPY | Age: 69
DRG: 378 | End: 2019-08-05
Payer: MEDICARE

## 2019-08-05 ENCOUNTER — ANESTHESIA (OUTPATIENT)
Dept: ENDOSCOPY | Age: 69
DRG: 378 | End: 2019-08-05
Payer: MEDICARE

## 2019-08-05 VITALS — OXYGEN SATURATION: 95 % | SYSTOLIC BLOOD PRESSURE: 127 MMHG | DIASTOLIC BLOOD PRESSURE: 43 MMHG

## 2019-08-05 LAB
ABO/RH: NORMAL
ABSOLUTE EOS #: 0.5 K/UL (ref 0–0.4)
ABSOLUTE IMMATURE GRANULOCYTE: ABNORMAL K/UL (ref 0–0.3)
ABSOLUTE LYMPH #: 1.5 K/UL (ref 1–4.8)
ABSOLUTE MONO #: 0.5 K/UL (ref 0.1–1.3)
ALBUMIN SERPL-MCNC: 3.5 G/DL (ref 3.5–5.2)
ALBUMIN/GLOBULIN RATIO: ABNORMAL (ref 1–2.5)
ALP BLD-CCNC: 28 U/L (ref 35–104)
ALT SERPL-CCNC: 8 U/L (ref 5–33)
ANION GAP SERPL CALCULATED.3IONS-SCNC: 11 MMOL/L (ref 9–17)
ANTIBODY SCREEN: NEGATIVE
ARM BAND NUMBER: NORMAL
AST SERPL-CCNC: 11 U/L
BASOPHILS # BLD: 1 % (ref 0–2)
BASOPHILS ABSOLUTE: 0 K/UL (ref 0–0.2)
BILIRUB SERPL-MCNC: 0.44 MG/DL (ref 0.3–1.2)
BLD PROD TYP BPU: NORMAL
BLOOD BANK COMMENT: NORMAL
BUN BLDV-MCNC: 31 MG/DL (ref 8–23)
BUN/CREAT BLD: ABNORMAL (ref 9–20)
CALCIUM SERPL-MCNC: 8.2 MG/DL (ref 8.6–10.4)
CHLORIDE BLD-SCNC: 109 MMOL/L (ref 98–107)
CO2: 23 MMOL/L (ref 20–31)
CREAT SERPL-MCNC: 1.15 MG/DL (ref 0.5–0.9)
CROSSMATCH RESULT: NORMAL
DIFFERENTIAL TYPE: ABNORMAL
DISPENSE STATUS BLOOD BANK: NORMAL
EOSINOPHILS RELATIVE PERCENT: 8 % (ref 0–4)
EXPIRATION DATE: NORMAL
GFR AFRICAN AMERICAN: 57 ML/MIN
GFR NON-AFRICAN AMERICAN: 47 ML/MIN
GFR SERPL CREATININE-BSD FRML MDRD: ABNORMAL ML/MIN/{1.73_M2}
GFR SERPL CREATININE-BSD FRML MDRD: ABNORMAL ML/MIN/{1.73_M2}
GLUCOSE BLD-MCNC: 104 MG/DL (ref 65–105)
GLUCOSE BLD-MCNC: 109 MG/DL (ref 70–99)
GLUCOSE BLD-MCNC: 114 MG/DL (ref 65–105)
GLUCOSE BLD-MCNC: 118 MG/DL (ref 65–105)
GLUCOSE BLD-MCNC: 98 MG/DL (ref 65–105)
HCT VFR BLD CALC: 23.3 % (ref 36–46)
HCT VFR BLD CALC: 23.8 % (ref 36–46)
HCT VFR BLD CALC: 24.8 % (ref 36–46)
HCT VFR BLD CALC: 25.3 % (ref 36–46)
HEMOGLOBIN: 7.5 G/DL (ref 12–16)
HEMOGLOBIN: 7.9 G/DL (ref 12–16)
HEMOGLOBIN: 8.1 G/DL (ref 12–16)
HEMOGLOBIN: 8.4 G/DL (ref 12–16)
IMMATURE GRANULOCYTES: ABNORMAL %
INR BLD: 1.1
LACTIC ACID, WHOLE BLOOD: NORMAL MMOL/L (ref 0.7–2.1)
LACTIC ACID: 0.5 MMOL/L (ref 0.5–2.2)
LYMPHOCYTES # BLD: 23 % (ref 24–44)
MCH RBC QN AUTO: 31.4 PG (ref 26–34)
MCHC RBC AUTO-ENTMCNC: 33.1 G/DL (ref 31–37)
MCV RBC AUTO: 94.8 FL (ref 80–100)
MONOCYTES # BLD: 7 % (ref 1–7)
NRBC AUTOMATED: ABNORMAL PER 100 WBC
PARTIAL THROMBOPLASTIN TIME: 28 SEC (ref 24–36)
PDW BLD-RTO: 13.7 % (ref 11.5–14.9)
PLATELET # BLD: 268 K/UL (ref 150–450)
PLATELET ESTIMATE: ABNORMAL
PMV BLD AUTO: 7.3 FL (ref 6–12)
POTASSIUM SERPL-SCNC: 4.3 MMOL/L (ref 3.7–5.3)
PROTHROMBIN TIME: 14.4 SEC (ref 11.8–14.6)
RBC # BLD: 2.67 M/UL (ref 4–5.2)
RBC # BLD: ABNORMAL 10*6/UL
SEG NEUTROPHILS: 61 % (ref 36–66)
SEGMENTED NEUTROPHILS ABSOLUTE COUNT: 3.9 K/UL (ref 1.3–9.1)
SODIUM BLD-SCNC: 143 MMOL/L (ref 135–144)
TOTAL PROTEIN: 5.9 G/DL (ref 6.4–8.3)
TRANSFUSION STATUS: NORMAL
UNIT DIVISION: 0
UNIT NUMBER: NORMAL
WBC # BLD: 6.3 K/UL (ref 3.5–11)
WBC # BLD: ABNORMAL 10*3/UL

## 2019-08-05 PROCEDURE — 3700000000 HC ANESTHESIA ATTENDED CARE: Performed by: INTERNAL MEDICINE

## 2019-08-05 PROCEDURE — 6370000000 HC RX 637 (ALT 250 FOR IP)

## 2019-08-05 PROCEDURE — 80053 COMPREHEN METABOLIC PANEL: CPT

## 2019-08-05 PROCEDURE — 85014 HEMATOCRIT: CPT

## 2019-08-05 PROCEDURE — 6360000002 HC RX W HCPCS: Performed by: STUDENT IN AN ORGANIZED HEALTH CARE EDUCATION/TRAINING PROGRAM

## 2019-08-05 PROCEDURE — 2580000003 HC RX 258: Performed by: STUDENT IN AN ORGANIZED HEALTH CARE EDUCATION/TRAINING PROGRAM

## 2019-08-05 PROCEDURE — 2060000000 HC ICU INTERMEDIATE R&B

## 2019-08-05 PROCEDURE — 6360000002 HC RX W HCPCS: Performed by: INTERNAL MEDICINE

## 2019-08-05 PROCEDURE — 2709999900 HC NON-CHARGEABLE SUPPLY: Performed by: INTERNAL MEDICINE

## 2019-08-05 PROCEDURE — 3700000001 HC ADD 15 MINUTES (ANESTHESIA): Performed by: INTERNAL MEDICINE

## 2019-08-05 PROCEDURE — 2580000003 HC RX 258: Performed by: INTERNAL MEDICINE

## 2019-08-05 PROCEDURE — 36415 COLL VENOUS BLD VENIPUNCTURE: CPT

## 2019-08-05 PROCEDURE — 6370000000 HC RX 637 (ALT 250 FOR IP): Performed by: INTERNAL MEDICINE

## 2019-08-05 PROCEDURE — 3609012400 HC EGD TRANSORAL BIOPSY SINGLE/MULTIPLE: Performed by: INTERNAL MEDICINE

## 2019-08-05 PROCEDURE — 99233 SBSQ HOSP IP/OBS HIGH 50: CPT | Performed by: INTERNAL MEDICINE

## 2019-08-05 PROCEDURE — 83605 ASSAY OF LACTIC ACID: CPT

## 2019-08-05 PROCEDURE — 0DB78ZX EXCISION OF STOMACH, PYLORUS, VIA NATURAL OR ARTIFICIAL OPENING ENDOSCOPIC, DIAGNOSTIC: ICD-10-PCS | Performed by: INTERNAL MEDICINE

## 2019-08-05 PROCEDURE — 88305 TISSUE EXAM BY PATHOLOGIST: CPT

## 2019-08-05 PROCEDURE — 7100000000 HC PACU RECOVERY - FIRST 15 MIN: Performed by: INTERNAL MEDICINE

## 2019-08-05 PROCEDURE — 43239 EGD BIOPSY SINGLE/MULTIPLE: CPT | Performed by: INTERNAL MEDICINE

## 2019-08-05 PROCEDURE — 6360000002 HC RX W HCPCS: Performed by: NURSE ANESTHETIST, CERTIFIED REGISTERED

## 2019-08-05 PROCEDURE — 85610 PROTHROMBIN TIME: CPT

## 2019-08-05 PROCEDURE — 85025 COMPLETE CBC W/AUTO DIFF WBC: CPT

## 2019-08-05 PROCEDURE — 2580000003 HC RX 258: Performed by: NURSE ANESTHETIST, CERTIFIED REGISTERED

## 2019-08-05 PROCEDURE — 82947 ASSAY GLUCOSE BLOOD QUANT: CPT

## 2019-08-05 PROCEDURE — C9113 INJ PANTOPRAZOLE SODIUM, VIA: HCPCS | Performed by: STUDENT IN AN ORGANIZED HEALTH CARE EDUCATION/TRAINING PROGRAM

## 2019-08-05 PROCEDURE — 7100000001 HC PACU RECOVERY - ADDTL 15 MIN: Performed by: INTERNAL MEDICINE

## 2019-08-05 PROCEDURE — 2580000003 HC RX 258

## 2019-08-05 PROCEDURE — 85018 HEMOGLOBIN: CPT

## 2019-08-05 PROCEDURE — 85730 THROMBOPLASTIN TIME PARTIAL: CPT

## 2019-08-05 RX ORDER — HYDRALAZINE HYDROCHLORIDE 20 MG/ML
5 INJECTION INTRAMUSCULAR; INTRAVENOUS EVERY 10 MIN PRN
Status: DISCONTINUED | OUTPATIENT
Start: 2019-08-05 | End: 2019-08-05 | Stop reason: HOSPADM

## 2019-08-05 RX ORDER — SODIUM CHLORIDE 9 MG/ML
INJECTION, SOLUTION INTRAVENOUS CONTINUOUS PRN
Status: DISCONTINUED | OUTPATIENT
Start: 2019-08-05 | End: 2019-08-05 | Stop reason: SDUPTHER

## 2019-08-05 RX ORDER — PANTOPRAZOLE SODIUM 40 MG/1
40 TABLET, DELAYED RELEASE ORAL
Status: DISCONTINUED | OUTPATIENT
Start: 2019-08-06 | End: 2019-08-06

## 2019-08-05 RX ORDER — PROPOFOL 10 MG/ML
INJECTION, EMULSION INTRAVENOUS PRN
Status: DISCONTINUED | OUTPATIENT
Start: 2019-08-05 | End: 2019-08-05 | Stop reason: SDUPTHER

## 2019-08-05 RX ORDER — LABETALOL 20 MG/4 ML (5 MG/ML) INTRAVENOUS SYRINGE
5 EVERY 10 MIN PRN
Status: DISCONTINUED | OUTPATIENT
Start: 2019-08-05 | End: 2019-08-05 | Stop reason: HOSPADM

## 2019-08-05 RX ORDER — METOCLOPRAMIDE HYDROCHLORIDE 5 MG/ML
10 INJECTION INTRAMUSCULAR; INTRAVENOUS
Status: DISCONTINUED | OUTPATIENT
Start: 2019-08-05 | End: 2019-08-05 | Stop reason: HOSPADM

## 2019-08-05 RX ORDER — FENTANYL CITRATE 50 UG/ML
25 INJECTION, SOLUTION INTRAMUSCULAR; INTRAVENOUS EVERY 5 MIN PRN
Status: DISCONTINUED | OUTPATIENT
Start: 2019-08-05 | End: 2019-08-05 | Stop reason: HOSPADM

## 2019-08-05 RX ORDER — PROMETHAZINE HYDROCHLORIDE 25 MG/ML
6.25 INJECTION, SOLUTION INTRAMUSCULAR; INTRAVENOUS
Status: DISCONTINUED | OUTPATIENT
Start: 2019-08-05 | End: 2019-08-05 | Stop reason: HOSPADM

## 2019-08-05 RX ORDER — DIPHENHYDRAMINE HYDROCHLORIDE 50 MG/ML
12.5 INJECTION INTRAMUSCULAR; INTRAVENOUS
Status: DISCONTINUED | OUTPATIENT
Start: 2019-08-05 | End: 2019-08-05 | Stop reason: HOSPADM

## 2019-08-05 RX ORDER — LIDOCAINE 4 G/G
PATCH TOPICAL
Status: COMPLETED
Start: 2019-08-05 | End: 2019-08-05

## 2019-08-05 RX ORDER — 0.9 % SODIUM CHLORIDE 0.9 %
500 INTRAVENOUS SOLUTION INTRAVENOUS
Status: DISCONTINUED | OUTPATIENT
Start: 2019-08-05 | End: 2019-08-05 | Stop reason: HOSPADM

## 2019-08-05 RX ADMIN — PROPOFOL 50 MG: 10 INJECTION, EMULSION INTRAVENOUS at 12:52

## 2019-08-05 RX ADMIN — CARVEDILOL 12.5 MG: 12.5 TABLET, FILM COATED ORAL at 21:47

## 2019-08-05 RX ADMIN — CITALOPRAM HYDROBROMIDE 10 MG: 20 TABLET ORAL at 14:20

## 2019-08-05 RX ADMIN — PRAMIPEXOLE DIHYDROCHLORIDE 1.5 MG: 1.5 TABLET ORAL at 14:21

## 2019-08-05 RX ADMIN — PIPERACILLIN SODIUM AND TAZOBACTAM SODIUM 3.38 G: 3; .375 INJECTION, POWDER, LYOPHILIZED, FOR SOLUTION INTRAVENOUS at 10:35

## 2019-08-05 RX ADMIN — SODIUM CHLORIDE: 9 INJECTION, SOLUTION INTRAVENOUS at 00:01

## 2019-08-05 RX ADMIN — SODIUM CHLORIDE: 9 INJECTION, SOLUTION INTRAVENOUS at 15:18

## 2019-08-05 RX ADMIN — AMLODIPINE BESYLATE 10 MG: 5 TABLET ORAL at 14:19

## 2019-08-05 RX ADMIN — PROPOFOL 70 MG: 10 INJECTION, EMULSION INTRAVENOUS at 12:47

## 2019-08-05 RX ADMIN — SODIUM CHLORIDE 8 MG/HR: 9 INJECTION, SOLUTION INTRAVENOUS at 04:30

## 2019-08-05 RX ADMIN — SODIUM CHLORIDE: 9 INJECTION, SOLUTION INTRAVENOUS at 12:44

## 2019-08-05 RX ADMIN — PROPOFOL 50 MG: 10 INJECTION, EMULSION INTRAVENOUS at 12:50

## 2019-08-05 RX ADMIN — LISINOPRIL 10 MG: 10 TABLET ORAL at 14:20

## 2019-08-05 RX ADMIN — SPIRONOLACTONE 25 MG: 25 TABLET, FILM COATED ORAL at 14:21

## 2019-08-05 RX ADMIN — ATORVASTATIN CALCIUM 80 MG: 80 TABLET, FILM COATED ORAL at 21:48

## 2019-08-05 RX ADMIN — SODIUM CHLORIDE: 9 INJECTION, SOLUTION INTRAVENOUS at 09:43

## 2019-08-05 RX ADMIN — GABAPENTIN 300 MG: 300 CAPSULE ORAL at 21:47

## 2019-08-05 RX ADMIN — PIPERACILLIN SODIUM AND TAZOBACTAM SODIUM 3.38 G: 3; .375 INJECTION, POWDER, LYOPHILIZED, FOR SOLUTION INTRAVENOUS at 03:43

## 2019-08-05 RX ADMIN — OXYCODONE HYDROCHLORIDE AND ACETAMINOPHEN 1 TABLET: 5; 325 TABLET ORAL at 19:10

## 2019-08-05 RX ADMIN — OXYCODONE HYDROCHLORIDE AND ACETAMINOPHEN 1 TABLET: 5; 325 TABLET ORAL at 05:39

## 2019-08-05 RX ADMIN — PIPERACILLIN SODIUM AND TAZOBACTAM SODIUM 3.38 G: 3; .375 INJECTION, POWDER, LYOPHILIZED, FOR SOLUTION INTRAVENOUS at 21:48

## 2019-08-05 RX ADMIN — Medication 10 ML: at 21:50

## 2019-08-05 RX ADMIN — PIPERACILLIN SODIUM AND TAZOBACTAM SODIUM 3.38 G: 3; .375 INJECTION, POWDER, LYOPHILIZED, FOR SOLUTION INTRAVENOUS at 17:05

## 2019-08-05 RX ADMIN — SODIUM CHLORIDE: 9 INJECTION, SOLUTION INTRAVENOUS at 23:12

## 2019-08-05 ASSESSMENT — ENCOUNTER SYMPTOMS
PHOTOPHOBIA: 0
APNEA: 0
CHOKING: 0
BACK PAIN: 1
COUGH: 0
SHORTNESS OF BREATH: 0
ABDOMINAL DISTENTION: 0
WHEEZING: 0
ABDOMINAL PAIN: 0
SHORTNESS OF BREATH: 1

## 2019-08-05 ASSESSMENT — PAIN DESCRIPTION - ORIENTATION: ORIENTATION: LOWER

## 2019-08-05 ASSESSMENT — PULMONARY FUNCTION TESTS
PIF_VALUE: 0
PIF_VALUE: 1
PIF_VALUE: 1
PIF_VALUE: 0
PIF_VALUE: 1
PIF_VALUE: 0
PIF_VALUE: 1
PIF_VALUE: 0

## 2019-08-05 ASSESSMENT — PAIN SCALES - GENERAL
PAINLEVEL_OUTOF10: 6
PAINLEVEL_OUTOF10: 0
PAINLEVEL_OUTOF10: 4
PAINLEVEL_OUTOF10: 4
PAINLEVEL_OUTOF10: 0

## 2019-08-05 ASSESSMENT — PAIN DESCRIPTION - LOCATION: LOCATION: BACK

## 2019-08-05 ASSESSMENT — PAIN DESCRIPTION - PAIN TYPE: TYPE: CHRONIC PAIN

## 2019-08-05 NOTE — PROGRESS NOTES
atorvastatin  80 mg Oral Nightly    carvedilol  12.5 mg Oral BID    citalopram  10 mg Oral Daily    furosemide  20 mg Oral Daily    gabapentin  300 mg Oral Nightly    lisinopril  10 mg Oral Daily    pramipexole  1.5 mg Oral Daily    spironolactone  25 mg Oral Daily    piperacillin-tazobactam  3.375 g Intravenous Q6H     Continuous Infusions:    pantoprozole (PROTONIX) infusion 8 mg/hr (08/05/19 0430)    sodium chloride 150 mL/hr at 08/05/19 0943     PRN Meds: sodium chloride flush, sodium chloride flush, cyclobenzaprine, nitroGLYCERIN, oxyCODONE-acetaminophen, acetaminophen, ondansetron    Data:     Past Medical History:   has a past medical history of Allergic rhinitis, cause unspecified, Back pain, Bowel obstruction ([facility] [address] CAD (coronary artery disease), Cellulitis, Cellulitis, Diverticulosis of colon (without mention of hemorrhage), GERD (gastroesophageal reflux disease), History of MI (myocardial infarction), History of ovarian cyst, History of peritonitis, HTN (hypertension), Hx of blood clots, Hyperlipidemia, Intestinal or peritoneal adhesions with obstruction (postoperative) (postinfection) ([facility] [address] Kidney infection, Lateral epicondylitis  of elbow, Muscle strain, Other abnormal glucose, Restless legs syndrome (RLS), Vitamin D deficiency, and Wears glasses. Social History:   reports that she quit smoking about 2 years ago. Her smoking use included cigarettes. She started smoking about 24 years ago. She has a 10.00 pack-year smoking history. She has never used smokeless tobacco. She reports that she does not drink alcohol or use drugs.      Family History:   Family History   Problem Relation Age of Onset    Stroke Mother     Diabetes Mother     Heart Disease Mother     High Blood Pressure Mother     Heart Disease Father     Heart Disease Brother     High Blood Pressure Brother     Heart Disease Maternal Grandmother     High Blood Pressure Sister        Vitals:  BP (!) 112/53   Pulse 59 Temp 98.5 °F (36.9 °C) (Oral)   Resp 16   Ht 5' 3\" (1.6 m)   Wt 171 lb 8.3 oz (77.8 kg)   SpO2 98%   BMI 30.38 kg/m²   Temp (24hrs), Av.3 °F (36.8 °C), Min:97.3 °F (36.3 °C), Max:98.7 °F (37.1 °C)    Recent Labs     19  1118 19  1612 19  0716 19  1122   POCGLU 113* 108* 118* 104       I/O(24Hr): Intake/Output Summary (Last 24 hours) at 2019 1137  Last data filed at 2019 0502  Gross per 24 hour   Intake 5189.67 ml   Output 2900 ml   Net 2289.67 ml       Labs:    [unfilled]    Lab Results   Component Value Date/Time    SPECIAL NOT REPORTED 2019 05:10 PM     Lab Results   Component Value Date/Time    CULTURE NO SIGNIFICANT GROWTH 2019 05:10 PM       [unfilled]    Radiology:    Dexa Bone Density Axial Skeleton    Result Date: 2019  EXAMINATION: BONE DENSITOMETRY 2019 10:21 am TECHNIQUE: A bone density dual x-ray absorptiometry (DEXA) scan was performed of the lumbar spine and left hip  on a [employer] system. COMPARISON: None. HISTORY: ORDERING SYSTEM PROVIDED HISTORY: Abnormal findings on diagnostic imaging of limbs FINDINGS: LUMBAR SPINE: The bone mineral density in the lumbar spine including the L1-L4 levels is measured at 1.144 g/cm2, which corresponds to a T-score of -0.4 and a Z-score of 0.8. This is within the normal range by WHO criteria. LEFT HIP: The bone mineral density in the total hip is measured at 0.869 g/cm2 corresponding to a T-score of -1.1 and a Z-score of -0.1. This is within the osteopenia range by WHO criteria. The bone mineral density of the femoral neck is measured at 0.714 g/cm2 corresponding to a T-score of -2.3 and a Z-score of -1.0. This is within the osteopenia range by WHO criteria. WHO fracture risk assessment tool (FRAX) projects a 10-year fracture risk of a major osteoporotic fracture at 19.9 % and hip fracture at 6.4 %. The patient's 10-year fracture risk is increased if untreated.   Fracture probability may be 08/03/2019 05:52 PM  ----------------------------------------------------------------   ----------------------------------------------------------------  Electronically signed by Izabel HoltInterpreting  physician) on 08/04/2019 03:04 PM  ----------------------------------------------------------------  Findings:   Right Impression:                Left Impression:  The common femoral vein          The common femoral, femoral, popliteal  demonstrates normal              and tibial veins demonstrate normal  compressibility and              compressibility and augmentation. augmentation. Limited visualization of the lower thigh                                   and calf veins. Normal compressibility of the great                                   saphenous vein. Normal compressibility of the small                                   saphenous vein. Velocities are measured in cm/s ; Diameters are measured in cm Right Lower Extremities DVT Study Measurements Right 2D Measurements +------------------------------------+----------+---------------+----------+ ! Location                            ! Visualized! Compressibility! Thrombosis! +------------------------------------+----------+---------------+----------+ ! Common Femoral                      !Yes       ! Yes            ! None      ! +------------------------------------+----------+---------------+----------+ Left Lower Extremities DVT Study Measurements Left 2D Measurements +------------------------------------+----------+---------------+----------+ ! Location                            ! Visualized! Compressibility! Thrombosis! +------------------------------------+----------+---------------+----------+ ! Common Femoral                      !Yes       ! Yes            ! None      ! +------------------------------------+----------+---------------+----------+ ! Prox Femoral

## 2019-08-05 NOTE — OP NOTE
evaluate her H. pylori status. Duodenum:     Descending: normal    Bulb: normal    While withdrawing the scope the above findings were verified and the scope was removed. The patient has tolerated the procedure and conscious sedation without unusual events. Recommendations/Plan:   1. F/U Biopsies  2. F/U In Office as instructed  3.  Discussed with the family                   Electronically signed by Reggie Orozco MD  on 8/5/2019 at 1:00 PM

## 2019-08-05 NOTE — ANESTHESIA PRE PROCEDURE
Department of Anesthesiology  Preprocedure Note       Name:  Chin Richmond   Age:  77 y.o.  :  1950                                          MRN:  [id number]         Date:  2019      Surgeon: Karen Murcia):  Myke Yun MD    Procedure: EGD ESOPHAGOGASTRODUODENOSCOPY (N/A Esophagus)    Medications prior to admission:   Prior to Admission medications    Medication Sig Start Date End Date Taking? Authorizing Provider   Meloxicam (MOBIC PO) Take by mouth daily Indications: pt unknown of dose   Yes Historical Provider, MD   amLODIPine (NORVASC) 10 MG tablet Take 1 tablet by mouth daily 19   Pancho Paulino MD   famotidine (PEPCID) 20 MG tablet Take 1 tablet by mouth 2 times daily 19   Pancho Paulino MD   carvedilol (COREG) 12.5 MG tablet Take 1 tablet by mouth 2 times daily 19   Pancho Paulnio MD   oxyCODONE-acetaminophen (PERCOCET) 5-325 MG per tablet Take 1 tablet by mouth 2 times daily as needed for Pain for up to 30 days. 19  MAIK Vasquez CNP   blood glucose monitor strips Test 2 times a day & as needed for symptoms of irregular blood glucose.  19   Pancho Paulino MD   Lancets MISC 1 each by Does not apply route daily 19   Pancho Paulino MD   citalopram (CELEXA) 10 MG tablet Take 1 tablet by mouth daily 19   Pancho Paulino MD   lisinopril (PRINIVIL;ZESTRIL) 10 MG tablet Take 1 tablet by mouth daily 19   Pancho Paulino MD   aspirin EC 81 MG EC tablet Take 1 tablet by mouth daily 19   MAIK Joseph CNP   glucose monitoring kit (FREESTYLE) monitoring kit 1 kit by Does not apply route daily 19   MAIK Joseph CNP   cholestyramine Reynolds Jerman) 4 g packet Take 1 packet by mouth 2 times daily 19   Pancho Paulino MD   SITagliptin (JANUVIA) 100 MG tablet Take 1 tablet by mouth daily 19   Pancho Paulino MD   acetaminophen (TYLENOL) 500 MG tablet Take 500 mg by mouth every 6 hours as needed for Pain    Historical Provider, MD   fenofibrate micronized (LOFIBRA) 134 MG capsule Take 1 capsule by mouth every morning (before breakfast) 3/28/19   Migel Brody MD   spironolactone (ALDACTONE) 25 MG tablet Take 1 tablet by mouth daily 3/22/19   Migel Brody MD   gabapentin (NEURONTIN) 300 MG capsule Take 1 capsule by mouth nightly for 180 days. 3/22/19 9/18/19  Javi Schwartz MD   cyanocobalamin 1000 MCG/ML injection Inject 1 mL into the muscle once for 1 dose 3/18/19 7/17/19  Javi Schwartz MD   lidocaine (XYLOCAINE) 5 % ointment 4-5 times a day to your right thigh for pain.  3/15/19   Landon Purcell MD   furosemide (LASIX) 40 MG tablet Take 1 tablet by mouth daily  Patient taking differently: Take 20 mg by mouth daily  2/19/19   Migel Brody MD   pramipexole (MIRAPEX) 1 MG tablet Take 1.5 tablets by mouth daily 2/8/19   Migel Brody MD   atorvastatin (LIPITOR) 80 MG tablet Take 1 tablet by mouth nightly 2/8/19   Migel Brody MD   hydrALAZINE (APRESOLINE) 100 MG tablet Take 1 tablet by mouth every 8 hours 1/22/19   Migel Brody MD   nitroGLYCERIN (NITROSTAT) 0.4 MG SL tablet Place 1 tablet under the tongue every 5 minutes as needed for Chest pain 1/8/18   Migel Brody MD   vitamin D (CHOLECALCIFEROL) 1000 UNIT TABS tablet Take 1 tablet by mouth daily 1/8/18   Migel Brody MD   cyclobenzaprine (FLEXERIL) 10 MG tablet Take 1 tablet by mouth 3 times daily as needed for Muscle spasms 1/8/18   Migel Brody MD   Calcium Carbonate-Vitamin D (CALCIUM 500/D) 500-125 MG-UNIT TABS Take 1 tablet by mouth 2 times daily     Historical Provider, MD       Current medications:    Current Facility-Administered Medications   Medication Dose Route Frequency Provider Last Rate Last Dose    [MAR Hold] sodium chloride flush 0.9 % injection 10 mL  10 mL Intravenous 2 times per day MD Donita Davis [facility] Hold] sodium chloride flush 0.9 % injection 10 mL  10 mL Intravenous PRN [place] Duffy MD        [facility] Hold] lidocaine 98% 98%  98%   Weight:   171 lb 8.3 oz (77.8 kg)    Height:                                                  BP Readings from Last 3 Encounters:   08/05/19 (!) 112/53   07/29/19 138/64   07/17/19 123/60       NPO Status: Time of last liquid consumption: 2100                        Time of last solid consumption: 2359                        Date of last liquid consumption: 08/04/19                        Date of last solid food consumption: 08/03/19    BMI:   Wt Readings from Last 3 Encounters:   08/05/19 171 lb 8.3 oz (77.8 kg)   07/29/19 172 lb (78 kg)   07/17/19 172 lb (78 kg)     Body mass index is 30.38 kg/m².     CBC:   Lab Results   Component Value Date    WBC 6.3 08/05/2019    RBC 2.67 08/05/2019    HGB 8.4 08/05/2019    HCT 25.3 08/05/2019    MCV 94.8 08/05/2019    RDW 13.7 08/05/2019     08/05/2019       CMP:   Lab Results   Component Value Date     08/05/2019    K 4.3 08/05/2019     08/05/2019    CO2 23 08/05/2019    BUN 31 08/05/2019    CREATININE 1.15 08/05/2019    GFRAA 57 08/05/2019    LABGLOM 47 08/05/2019    GLUCOSE 109 08/05/2019    PROT 5.9 08/05/2019    CALCIUM 8.2 08/05/2019    BILITOT 0.44 08/05/2019    ALKPHOS 28 08/05/2019    AST 11 08/05/2019    ALT 8 08/05/2019       POC Tests:   Recent Labs     08/05/19  1122   POCGLU 104       Coags:   Lab Results   Component Value Date    PROTIME 14.4 08/05/2019    INR 1.1 08/05/2019    APTT 28.0 08/05/2019       HCG (If Applicable): No results found for: PREGTESTUR, PREGSERUM, HCG, HCGQUANT     ABGs: No results found for: PHART, PO2ART, WUP0KIT, DNV9JQS, BEART, S5WNCMTD     Type & Screen (If Applicable):  No results found for: LABABO, [address]    Anesthesia Evaluation  Patient summary reviewed and Nursing notes reviewed  Airway: Mallampati: II  TM distance: >3 FB     Mouth opening: > = 3 FB Dental:    (+) caps      Pulmonary: breath sounds clear to auscultation  (+) shortness of breath:                             Cardiovascular:  Exercise tolerance: good (>4 METS),   (+) hypertension:, CAD:, hyperlipidemia        Rhythm: regular  Rate: normal           Beta Blocker:  Dose within 24 Hrs         Neuro/Psych:   (+) CVA: no interval change, neuromuscular disease:, TIA, psychiatric history: stable with treatmentdepression/anxiety              ROS comment: Restless leg syndrome GI/Hepatic/Renal:   (+) GERD:, renal disease:,          ROS comment: diverticulosis. Endo/Other:    (+) DiabetesType II DM, using insulin, : arthritis:., .                  ROS comment: Anemia  Back pain Abdominal:           Vascular:   + DVT, . Anesthesia Plan      MAC     ASA 3     (Hx PONV with MAC one year ago)  Induction: intravenous. Anesthetic plan and risks discussed with patient. Plan discussed with CRNA.                   Walt Santos MD   8/5/2019

## 2019-08-05 NOTE — PLAN OF CARE
Problem: Bowel Function - Altered:  Goal: Bowel elimination is within specified parameters  Description  Bowel elimination is within specified parameters  Outcome: Ongoing     Problem: Fluid Volume - Imbalance:  Goal: Will show no signs and symptoms of excessive bleeding  Description  Will show no signs and symptoms of excessive bleeding  Outcome: Ongoing     Problem: Fluid Volume - Imbalance:  Goal: Absence of imbalanced fluid volume signs and symptoms  Description  Absence of imbalanced fluid volume signs and symptoms  Outcome: Ongoing     Problem: Nausea/Vomiting:  Goal: Absence of nausea/vomiting  Description  Absence of nausea/vomiting  Outcome: Ongoing  Note:   Pt has no emesis so far during this shift, RN will continue to monitor. Problem: Musculor/Skeletal Functional Status  Goal: Absence of falls  Outcome: Ongoing  Note:   No falls this shift. Call light within reach and siderails x2. Bed in lowest position. Patient safety maintained. Problem: Pain:  Goal: Pain level will decrease  Description  Pain level will decrease  Outcome: Ongoing  Note:   Patient denies any pain. Will continue to monitor. Problem: Falls - Risk of:  Goal: Will remain free from falls  Description  Will remain free from falls  Outcome: Ongoing  Note:   No falls this shift. Call light within reach and siderails x2. Bed in lowest position. Patient safety maintained. Problem: Falls - Risk of:  Goal: Absence of physical injury  Description  Absence of physical injury  Outcome: Ongoing  Note:   No falls this shift. Call light within reach and siderails x2. Bed in lowest position. Patient safety maintained.

## 2019-08-06 LAB
ABSOLUTE EOS #: 0.4 K/UL (ref 0–0.4)
ABSOLUTE IMMATURE GRANULOCYTE: ABNORMAL K/UL (ref 0–0.3)
ABSOLUTE LYMPH #: 1.5 K/UL (ref 1–4.8)
ABSOLUTE MONO #: 0.6 K/UL (ref 0.1–1.3)
ALBUMIN SERPL-MCNC: 3.3 G/DL (ref 3.5–5.2)
ALBUMIN/GLOBULIN RATIO: ABNORMAL (ref 1–2.5)
ALP BLD-CCNC: 26 U/L (ref 35–104)
ALT SERPL-CCNC: 7 U/L (ref 5–33)
ANION GAP SERPL CALCULATED.3IONS-SCNC: 10 MMOL/L (ref 9–17)
AST SERPL-CCNC: 11 U/L
BASOPHILS # BLD: 1 % (ref 0–2)
BASOPHILS ABSOLUTE: 0.1 K/UL (ref 0–0.2)
BILIRUB SERPL-MCNC: 0.27 MG/DL (ref 0.3–1.2)
BUN BLDV-MCNC: 22 MG/DL (ref 8–23)
BUN/CREAT BLD: ABNORMAL (ref 9–20)
CALCIUM SERPL-MCNC: 7.8 MG/DL (ref 8.6–10.4)
CHLORIDE BLD-SCNC: 110 MMOL/L (ref 98–107)
CO2: 22 MMOL/L (ref 20–31)
CREAT SERPL-MCNC: 1.11 MG/DL (ref 0.5–0.9)
DIFFERENTIAL TYPE: ABNORMAL
EOSINOPHILS RELATIVE PERCENT: 6 % (ref 0–4)
GFR AFRICAN AMERICAN: 59 ML/MIN
GFR NON-AFRICAN AMERICAN: 49 ML/MIN
GFR SERPL CREATININE-BSD FRML MDRD: ABNORMAL ML/MIN/{1.73_M2}
GFR SERPL CREATININE-BSD FRML MDRD: ABNORMAL ML/MIN/{1.73_M2}
GLUCOSE BLD-MCNC: 108 MG/DL (ref 65–105)
GLUCOSE BLD-MCNC: 112 MG/DL (ref 65–105)
GLUCOSE BLD-MCNC: 113 MG/DL (ref 65–105)
GLUCOSE BLD-MCNC: 113 MG/DL (ref 65–105)
GLUCOSE BLD-MCNC: 114 MG/DL (ref 70–99)
GLUCOSE BLD-MCNC: 91 MG/DL (ref 65–105)
HCT VFR BLD CALC: 23.7 % (ref 36–46)
HCT VFR BLD CALC: 24 % (ref 36–46)
HCT VFR BLD CALC: 25.6 % (ref 36–46)
HCT VFR BLD CALC: 28.7 % (ref 36–46)
HEMOGLOBIN: 7.8 G/DL (ref 12–16)
HEMOGLOBIN: 8 G/DL (ref 12–16)
HEMOGLOBIN: 8.1 G/DL (ref 12–16)
HEMOGLOBIN: 9.2 G/DL (ref 12–16)
IMMATURE GRANULOCYTES: ABNORMAL %
LYMPHOCYTES # BLD: 21 % (ref 24–44)
MCH RBC QN AUTO: 31.8 PG (ref 26–34)
MCHC RBC AUTO-ENTMCNC: 32.8 G/DL (ref 31–37)
MCV RBC AUTO: 97 FL (ref 80–100)
MONOCYTES # BLD: 8 % (ref 1–7)
NRBC AUTOMATED: ABNORMAL PER 100 WBC
PDW BLD-RTO: 14 % (ref 11.5–14.9)
PLATELET # BLD: 270 K/UL (ref 150–450)
PLATELET ESTIMATE: ABNORMAL
PMV BLD AUTO: 7.8 FL (ref 6–12)
POTASSIUM SERPL-SCNC: 4 MMOL/L (ref 3.7–5.3)
RBC # BLD: 2.44 M/UL (ref 4–5.2)
RBC # BLD: ABNORMAL 10*6/UL
SEG NEUTROPHILS: 64 % (ref 36–66)
SEGMENTED NEUTROPHILS ABSOLUTE COUNT: 4.4 K/UL (ref 1.3–9.1)
SODIUM BLD-SCNC: 142 MMOL/L (ref 135–144)
SURGICAL PATHOLOGY REPORT: NORMAL
TOTAL PROTEIN: 5.3 G/DL (ref 6.4–8.3)
WBC # BLD: 6.9 K/UL (ref 3.5–11)
WBC # BLD: ABNORMAL 10*3/UL

## 2019-08-06 PROCEDURE — 6370000000 HC RX 637 (ALT 250 FOR IP): Performed by: STUDENT IN AN ORGANIZED HEALTH CARE EDUCATION/TRAINING PROGRAM

## 2019-08-06 PROCEDURE — 6370000000 HC RX 637 (ALT 250 FOR IP): Performed by: INTERNAL MEDICINE

## 2019-08-06 PROCEDURE — 97110 THERAPEUTIC EXERCISES: CPT

## 2019-08-06 PROCEDURE — 85025 COMPLETE CBC W/AUTO DIFF WBC: CPT

## 2019-08-06 PROCEDURE — 2060000000 HC ICU INTERMEDIATE R&B

## 2019-08-06 PROCEDURE — 99232 SBSQ HOSP IP/OBS MODERATE 35: CPT | Performed by: INTERNAL MEDICINE

## 2019-08-06 PROCEDURE — 2580000003 HC RX 258: Performed by: INTERNAL MEDICINE

## 2019-08-06 PROCEDURE — 80053 COMPREHEN METABOLIC PANEL: CPT

## 2019-08-06 PROCEDURE — 6360000002 HC RX W HCPCS: Performed by: INTERNAL MEDICINE

## 2019-08-06 PROCEDURE — 36415 COLL VENOUS BLD VENIPUNCTURE: CPT

## 2019-08-06 PROCEDURE — 85018 HEMOGLOBIN: CPT

## 2019-08-06 PROCEDURE — 97530 THERAPEUTIC ACTIVITIES: CPT

## 2019-08-06 PROCEDURE — 85014 HEMATOCRIT: CPT

## 2019-08-06 PROCEDURE — 97166 OT EVAL MOD COMPLEX 45 MIN: CPT

## 2019-08-06 PROCEDURE — 6370000000 HC RX 637 (ALT 250 FOR IP): Performed by: NURSE PRACTITIONER

## 2019-08-06 RX ORDER — POLYETHYLENE GLYCOL 3350 17 G/17G
150 POWDER, FOR SOLUTION ORAL ONCE
Status: COMPLETED | OUTPATIENT
Start: 2019-08-06 | End: 2019-08-06

## 2019-08-06 RX ORDER — PANTOPRAZOLE SODIUM 40 MG/1
40 TABLET, DELAYED RELEASE ORAL
Status: DISCONTINUED | OUTPATIENT
Start: 2019-08-06 | End: 2019-08-07 | Stop reason: HOSPADM

## 2019-08-06 RX ADMIN — PRAMIPEXOLE DIHYDROCHLORIDE 1.5 MG: 1.5 TABLET ORAL at 19:58

## 2019-08-06 RX ADMIN — PANTOPRAZOLE SODIUM 40 MG: 40 TABLET, DELAYED RELEASE ORAL at 16:34

## 2019-08-06 RX ADMIN — CARVEDILOL 12.5 MG: 12.5 TABLET, FILM COATED ORAL at 09:45

## 2019-08-06 RX ADMIN — SODIUM CHLORIDE: 9 INJECTION, SOLUTION INTRAVENOUS at 05:31

## 2019-08-06 RX ADMIN — OXYCODONE HYDROCHLORIDE AND ACETAMINOPHEN 1 TABLET: 5; 325 TABLET ORAL at 04:22

## 2019-08-06 RX ADMIN — OXYCODONE HYDROCHLORIDE AND ACETAMINOPHEN 1 TABLET: 5; 325 TABLET ORAL at 20:46

## 2019-08-06 RX ADMIN — CARVEDILOL 12.5 MG: 12.5 TABLET, FILM COATED ORAL at 19:59

## 2019-08-06 RX ADMIN — LISINOPRIL 10 MG: 10 TABLET ORAL at 09:45

## 2019-08-06 RX ADMIN — Medication 10 ML: at 19:59

## 2019-08-06 RX ADMIN — POLYETHYLENE GLYCOL 3350 150 G: 17 POWDER, FOR SOLUTION ORAL at 11:43

## 2019-08-06 RX ADMIN — AMLODIPINE BESYLATE 10 MG: 5 TABLET ORAL at 09:45

## 2019-08-06 RX ADMIN — FUROSEMIDE 20 MG: 20 TABLET ORAL at 09:45

## 2019-08-06 RX ADMIN — GABAPENTIN 300 MG: 300 CAPSULE ORAL at 19:58

## 2019-08-06 RX ADMIN — PIPERACILLIN SODIUM AND TAZOBACTAM SODIUM 3.38 G: 3; .375 INJECTION, POWDER, LYOPHILIZED, FOR SOLUTION INTRAVENOUS at 05:36

## 2019-08-06 RX ADMIN — ATORVASTATIN CALCIUM 80 MG: 80 TABLET, FILM COATED ORAL at 19:59

## 2019-08-06 RX ADMIN — PANTOPRAZOLE SODIUM 40 MG: 40 TABLET, DELAYED RELEASE ORAL at 07:32

## 2019-08-06 RX ADMIN — PIPERACILLIN SODIUM AND TAZOBACTAM SODIUM 3.38 G: 3; .375 INJECTION, POWDER, LYOPHILIZED, FOR SOLUTION INTRAVENOUS at 10:07

## 2019-08-06 RX ADMIN — CITALOPRAM HYDROBROMIDE 10 MG: 20 TABLET ORAL at 09:44

## 2019-08-06 RX ADMIN — SPIRONOLACTONE 25 MG: 25 TABLET, FILM COATED ORAL at 09:45

## 2019-08-06 ASSESSMENT — ENCOUNTER SYMPTOMS
COUGH: 0
BLOOD IN STOOL: 1
APNEA: 0
SHORTNESS OF BREATH: 0
NAUSEA: 0
CONSTIPATION: 0
PHOTOPHOBIA: 0
VOMITING: 0
ABDOMINAL PAIN: 0

## 2019-08-06 ASSESSMENT — PAIN SCALES - GENERAL
PAINLEVEL_OUTOF10: 5
PAINLEVEL_OUTOF10: 5
PAINLEVEL_OUTOF10: 0

## 2019-08-06 NOTE — CARE COORDINATION
ONGOING DISCHARGE PLAN:    Spoke with patient regarding discharge plan and patient confirms that plan is still to DC to home w/  w/ no need for VNS. PT. Had EGD yesterday w/ GI, Per notes, shows Ulcers. Remains on IV Zosyn, Fluids, HGB today 7.8.    150 gram of Miralax ordered for today, Per GI, if no blood in stool can DC nargis. Will continue to follow for additional discharge needs.     Electronically signed by Kwabena Rodrigues RN on 8/6/2019 at 11:54 AM

## 2019-08-06 NOTE — PROGRESS NOTES
may be lower if the patient has received treatment. FRAX may underestimate fracture probability in patients who have impaired functional status from rheumatoid arthritis, history of frequent falls, history of multiple fractures, severe vertebral fractures, parental history of non-hip fragility fractures, glucocorticoid doses in excess of 7.5 mg/day or frequent use, high dose inhaled glucocorticoids, and if the T-score of the lumbar spine is > 1 SD lower than the femoral neck. Clinical judgment and/or patient preferences may indicate treatment for people with 10 year fracture probabilities above or below these levels. Osteopenia by WHO criteria. Fluoro For Surgical Procedures    Result Date: 7/29/2019  Radiology exam is complete. No Radiologist dictation. Please follow up with ordering provider. Vl Dup Lower Extremity Venous Left    Result Date: 8/4/2019    [facility]  Vascular Lower Extremities DVT Study Procedure   Patient Name   Toribio Higgins       Date of Study           08/03/2019                 Shamar Gudino   Date of Birth  1950  Gender                  Female   Age            77 year(s)  Race                       Room Number    2122   Corporate ID # [id number]   Patient Acct # [de-identified]   MR #           [id number]      [facility]   Accession #    [id number]   Interpreting Physician  Jordan Padilla   Referring                  Referring Physician     Lakshmi Thmopson MD  Nurse  Practitioner  Procedure Type of Study:   Veins: Lower Extremities DVT Study, Venous Scan Lower Left. Indications for Study:Swelling. Patient Status:ER. Technical Quality:Limited visualization. Conclusions   Summary   No evidence of superficial or deep venous thrombosis in the left lower  extremity and left common left common femoral vein.    Signature   ----------------------------------------------------------------  Electronically signed by Colby Dick RVT(Sonographer) on +------------------------------------+----------+---------------+----------+    Cta Abdomen Pelvis W Wo Contrast    Result Date: 8/4/2019  EXAMINATION: CTA OF THE ABDOMEN AND PELVIS WITH AND WITHOUT CONTRAST 8/3/2019 5:45 pm TECHNIQUE: CTA of the abdomen and pelvis was performed without and with the administration of intravenous contrast. Multiplanar reformatted images are provided for review. MIP images are provided for review. Dose modulation, iterative reconstruction, and/or weight based adjustment of the mA/kV was utilized to reduce the radiation dose to as low as reasonably achievable. 3D surface reformatted and curved MIP images were submitted for review. COMPARISON: CT 12/18/2018 HISTORY: ORDERING SYSTEM PROVIDED HISTORY: GI Bleed TECHNOLOGIST PROVIDED HISTORY: Reason for Exam: PT STATES BLOOD IN STOOL X1 DAY Acuity: Acute Type of Exam: Initial FINDINGS: CTA ABDOMEN: The lung bases are clear. No pleural or pericardial effusions. Abdominal aorta has normal contour and caliber. Mild abdominal aortic atherosclerotic disease. Abdominal aortic branch vessels are patent. 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 The liver, spleen, pancreas, adrenal glands, and kidneys are normal. Gallbladder contains calcified gallstone. No biliary ductal dilatation. The unopacified stomach, duodenum, and small bowel are unremarkable. No small bowel obstruction. Colonic diverticulosis with some pericolic induration at the junction of the descending colon and sigmoid colon. No active extravasation from the GI tract. No free fluid or free air. No enlarged lymph nodes. CTA PELVIS: Iliac arteries demonstrate normal caliber and mild atherosclerotic disease. No iliac artery dissection or aneurysm. The bladder is under distended. Uterus is absent. 1. No aortoiliac aneurysm or dissection. No active hemorrhage from the GI tract. 2. Acute diverticulitis at the junction of the descending colon and sigmoid colon.  3.

## 2019-08-06 NOTE — PROGRESS NOTES
[address] [facility]    OCCUPATIONAL THERAPY MISSED TREATMENT NOTE   INPATIENT   Date: 19  Patient Name: Mikhail Loaiza       Room: 2384/8911-17  MRN: [id number]   Account #: [de-identified]    : 1950  (78 y.o.)  Gender: female                 REASON FOR MISSED TREATMENT:  Patient unable to participate   -   Pt with nursing staff at 10:24 AM. Will re-attempt as able.      Sarah Sic, OT

## 2019-08-06 NOTE — PROGRESS NOTES
GI Progress notes    8/6/2019   10:52 AM    Name:  Diandra De La Fuente  MRN:    [id number]     Acct:     [de-identified]   Room:  [facility] [place] [address] Day: 3     Admit Date: 8/3/2019  4:09 PM  PCP: Jennifer Mcnamara MD    Subjective:     C/C:   Chief Complaint   Patient presents with    Abdominal Pain    Leg Pain       Interval History: Status: improved. Patient seen and examined at bedside. No acute events overnight. Denies abdominal pain, nausea, vomiting, constipation. She has had multiple black, soft stools overnight. Denies fevers, chills. Hemoglobin stable, 7.8    EGD yesterday revealed multiple small ulcers in the antrum as well as a large ulceration in the pylorus with no stigmata of bleeding. Biopsies were taken. ROS:  Constitutional: negative for chills, fevers and sweats  Gastrointestinal: negative for abdominal pain, constipation, diarrhea, nausea and vomiting      Medications: Allergies:    Allergies   Allergen Reactions    Bactrim [Sulfamethoxazole-Trimethoprim] Other (See Comments)     sepsis    Codeine Itching    Seasonal        Current Meds:   polyethylene glycol (GLYCOLAX) packet 150 g Once   pantoprazole (PROTONIX) tablet 40 mg QAM AC   sodium chloride flush 0.9 % injection 10 mL 2 times per day   sodium chloride flush 0.9 % injection 10 mL PRN   lidocaine 4 % external patch 1 patch Daily   sodium chloride flush 0.9 % injection 10 mL PRN   amLODIPine (NORVASC) tablet 10 mg Daily   atorvastatin (LIPITOR) tablet 80 mg Nightly   carvedilol (COREG) tablet 12.5 mg BID   citalopram (CELEXA) tablet 10 mg Daily   cyclobenzaprine (FLEXERIL) tablet 10 mg TID PRN   furosemide (LASIX) tablet 20 mg Daily   gabapentin (NEURONTIN) capsule 300 mg Nightly   lisinopril (PRINIVIL;ZESTRIL) tablet 10 mg Daily   nitroGLYCERIN (NITROSTAT) SL tablet 0.4 mg Q5 Min PRN   oxyCODONE-acetaminophen (PERCOCET) 5-325 MG per tablet 1 tablet BID PRN   pramipexole (MIRAPEX) tablet 1.5 mg Daily   spironolactone (ALDACTONE)

## 2019-08-07 VITALS
HEART RATE: 65 BPM | BODY MASS INDEX: 30.39 KG/M2 | RESPIRATION RATE: 16 BRPM | SYSTOLIC BLOOD PRESSURE: 115 MMHG | WEIGHT: 171.52 LBS | OXYGEN SATURATION: 97 % | TEMPERATURE: 98.8 F | DIASTOLIC BLOOD PRESSURE: 49 MMHG | HEIGHT: 63 IN

## 2019-08-07 PROBLEM — K27.9 PEPTIC ULCER: Status: ACTIVE | Noted: 2019-08-07

## 2019-08-07 LAB
ABSOLUTE EOS #: 0.3 K/UL (ref 0–0.4)
ABSOLUTE IMMATURE GRANULOCYTE: ABNORMAL K/UL (ref 0–0.3)
ABSOLUTE LYMPH #: 1.8 K/UL (ref 1–4.8)
ABSOLUTE MONO #: 0.6 K/UL (ref 0.1–1.3)
ALBUMIN SERPL-MCNC: 3.6 G/DL (ref 3.5–5.2)
ALBUMIN/GLOBULIN RATIO: ABNORMAL (ref 1–2.5)
ALP BLD-CCNC: 29 U/L (ref 35–104)
ALT SERPL-CCNC: 8 U/L (ref 5–33)
ANION GAP SERPL CALCULATED.3IONS-SCNC: 9 MMOL/L (ref 9–17)
AST SERPL-CCNC: 13 U/L
BASOPHILS # BLD: 1 % (ref 0–2)
BASOPHILS ABSOLUTE: 0 K/UL (ref 0–0.2)
BILIRUB SERPL-MCNC: 0.24 MG/DL (ref 0.3–1.2)
BUN BLDV-MCNC: 13 MG/DL (ref 8–23)
BUN/CREAT BLD: ABNORMAL (ref 9–20)
CALCIUM SERPL-MCNC: 8.7 MG/DL (ref 8.6–10.4)
CHLORIDE BLD-SCNC: 107 MMOL/L (ref 98–107)
CO2: 26 MMOL/L (ref 20–31)
CREAT SERPL-MCNC: 0.88 MG/DL (ref 0.5–0.9)
DIFFERENTIAL TYPE: ABNORMAL
EOSINOPHILS RELATIVE PERCENT: 5 % (ref 0–4)
GFR AFRICAN AMERICAN: >60 ML/MIN
GFR NON-AFRICAN AMERICAN: >60 ML/MIN
GFR SERPL CREATININE-BSD FRML MDRD: ABNORMAL ML/MIN/{1.73_M2}
GFR SERPL CREATININE-BSD FRML MDRD: ABNORMAL ML/MIN/{1.73_M2}
GLUCOSE BLD-MCNC: 119 MG/DL (ref 65–105)
GLUCOSE BLD-MCNC: 120 MG/DL (ref 70–99)
HCT VFR BLD CALC: 25.5 % (ref 36–46)
HCT VFR BLD CALC: 27 % (ref 36–46)
HEMOGLOBIN: 8.5 G/DL (ref 12–16)
HEMOGLOBIN: 9.3 G/DL (ref 12–16)
IMMATURE GRANULOCYTES: ABNORMAL %
LYMPHOCYTES # BLD: 25 % (ref 24–44)
MCH RBC QN AUTO: 31.6 PG (ref 26–34)
MCHC RBC AUTO-ENTMCNC: 33.2 G/DL (ref 31–37)
MCV RBC AUTO: 95.2 FL (ref 80–100)
MONOCYTES # BLD: 8 % (ref 1–7)
NRBC AUTOMATED: ABNORMAL PER 100 WBC
PDW BLD-RTO: 13.5 % (ref 11.5–14.9)
PLATELET # BLD: 328 K/UL (ref 150–450)
PLATELET ESTIMATE: ABNORMAL
PMV BLD AUTO: 7.7 FL (ref 6–12)
POTASSIUM SERPL-SCNC: 3.9 MMOL/L (ref 3.7–5.3)
RBC # BLD: 2.68 M/UL (ref 4–5.2)
RBC # BLD: ABNORMAL 10*6/UL
SEG NEUTROPHILS: 61 % (ref 36–66)
SEGMENTED NEUTROPHILS ABSOLUTE COUNT: 4.5 K/UL (ref 1.3–9.1)
SODIUM BLD-SCNC: 142 MMOL/L (ref 135–144)
TOTAL PROTEIN: 5.8 G/DL (ref 6.4–8.3)
WBC # BLD: 7.2 K/UL (ref 3.5–11)
WBC # BLD: ABNORMAL 10*3/UL

## 2019-08-07 PROCEDURE — 85014 HEMATOCRIT: CPT

## 2019-08-07 PROCEDURE — 6370000000 HC RX 637 (ALT 250 FOR IP): Performed by: STUDENT IN AN ORGANIZED HEALTH CARE EDUCATION/TRAINING PROGRAM

## 2019-08-07 PROCEDURE — 6370000000 HC RX 637 (ALT 250 FOR IP): Performed by: INTERNAL MEDICINE

## 2019-08-07 PROCEDURE — 85025 COMPLETE CBC W/AUTO DIFF WBC: CPT

## 2019-08-07 PROCEDURE — 97535 SELF CARE MNGMENT TRAINING: CPT

## 2019-08-07 PROCEDURE — 82947 ASSAY GLUCOSE BLOOD QUANT: CPT

## 2019-08-07 PROCEDURE — 85018 HEMOGLOBIN: CPT

## 2019-08-07 PROCEDURE — 99232 SBSQ HOSP IP/OBS MODERATE 35: CPT | Performed by: INTERNAL MEDICINE

## 2019-08-07 PROCEDURE — 2580000003 HC RX 258: Performed by: INTERNAL MEDICINE

## 2019-08-07 PROCEDURE — 99239 HOSP IP/OBS DSCHRG MGMT >30: CPT | Performed by: INTERNAL MEDICINE

## 2019-08-07 PROCEDURE — 80053 COMPREHEN METABOLIC PANEL: CPT

## 2019-08-07 PROCEDURE — 36415 COLL VENOUS BLD VENIPUNCTURE: CPT

## 2019-08-07 RX ORDER — PANTOPRAZOLE SODIUM 40 MG/1
40 TABLET, DELAYED RELEASE ORAL
Qty: 90 TABLET | Refills: 1
Start: 2019-08-07 | End: 2019-11-11 | Stop reason: SDUPTHER

## 2019-08-07 RX ORDER — PANTOPRAZOLE SODIUM 40 MG/1
40 TABLET, DELAYED RELEASE ORAL
Qty: 90 TABLET | Refills: 1 | Status: SHIPPED | OUTPATIENT
Start: 2019-08-07 | End: 2019-08-07 | Stop reason: SDUPTHER

## 2019-08-07 RX ADMIN — AMLODIPINE BESYLATE 10 MG: 5 TABLET ORAL at 08:09

## 2019-08-07 RX ADMIN — CITALOPRAM HYDROBROMIDE 10 MG: 20 TABLET ORAL at 08:09

## 2019-08-07 RX ADMIN — FUROSEMIDE 20 MG: 20 TABLET ORAL at 08:09

## 2019-08-07 RX ADMIN — OXYCODONE HYDROCHLORIDE AND ACETAMINOPHEN 1 TABLET: 5; 325 TABLET ORAL at 03:06

## 2019-08-07 RX ADMIN — LISINOPRIL 10 MG: 10 TABLET ORAL at 08:09

## 2019-08-07 RX ADMIN — CARVEDILOL 12.5 MG: 12.5 TABLET, FILM COATED ORAL at 08:09

## 2019-08-07 RX ADMIN — Medication 10 ML: at 08:10

## 2019-08-07 RX ADMIN — PANTOPRAZOLE SODIUM 40 MG: 40 TABLET, DELAYED RELEASE ORAL at 08:09

## 2019-08-07 ASSESSMENT — PAIN SCALES - GENERAL
PAINLEVEL_OUTOF10: 0
PAINLEVEL_OUTOF10: 5

## 2019-08-07 ASSESSMENT — ENCOUNTER SYMPTOMS
APNEA: 0
BLOOD IN STOOL: 0
BACK PAIN: 1
SHORTNESS OF BREATH: 0
WHEEZING: 0
PHOTOPHOBIA: 0
COUGH: 0
ABDOMINAL PAIN: 0

## 2019-08-07 ASSESSMENT — PAIN DESCRIPTION - PROGRESSION: CLINICAL_PROGRESSION: NOT CHANGED

## 2019-08-07 NOTE — PROGRESS NOTES
examined today   I have discussed the care of pt , including pertinent history and exam findings,  with the resident. I have reviewed the key elements of all parts of the encounter with the resident. I agree with the assessment, plan and orders as documented by the resident.     Electronically signed by Jasmina Esparza MD on 8/7/2019 at 10:13 PM

## 2019-08-07 NOTE — PROGRESS NOTES
[address] [facility]    INPATIENT OCCUPATIONAL THERAPY  PROGRESS NOTE  Date: 2019  Patient Name: Juliette Camarena      Room: -  MRN: [id number]    : 1950  (78 y.o.) Gender: female     Discharge Recommendations:  Home with assist PRN       Referring Practitioner: Dr. Papa Lange  Diagnosis: GI Bleed  General  Chart Reviewed: Yes, Orders, Progress Notes  Patient assessed for rehabilitation services?: Yes  Family / Caregiver Present: No  Referring Practitioner: Dr. Papa Lange  Diagnosis: GI Bleed    Restrictions  Restrictions/Precautions: Fall Risk  Implants present? : Metal implants(Left wrist surgery with stu)  Other position/activity restrictions: Pt reprots she has couple falls at home in this year. Pt's Hgb low today, to receive blood transfusion soon. Required Braces or Orthoses?: No      Subjective  Subjective: \"My  usualy helps me\" referring to doffing support stockings. Comments: Pt pleasant. Upright in bed upon entry. Receptive to therapeutic intervention. Hands on training completed for increased safety c compresssion stocking management. Patient Currently in Pain: Denies  Overall Orientation Status: Within Functional Limits     Pain Assessment  Clinical Progression: Not changed  Response to Pain Intervention: Patient Satisfied    Objective  Cognition  Overall Cognitive Status: WFL  Bed mobility  Supine to Sit: Independent  Sit to Supine: Independent  Scooting: Independent  Balance  Sitting Balance: Modified independent   Standing Balance: Modified independent      Functional Mobility  Functional - Mobility Device: No device  Activity: Other(in room)  Assist Level: Modified independent   Functional Mobility Comments: from EOB <>bed side chair for ADL training; lo LOB; stabilizes on furniture 1x during pivot; notes she uses RW at home PRN.    ADL  Feeding: Independent  Grooming: Setup;Modified independent   UE Bathing: Setup;Modified independent   LE Bathing: Setup;Modified independent   UE Dressing: Setup;Modified independent   LE Dressing: Supervision;Setup;Modified independent   Toileting: Modified independent   Additional Comments: Pt spouse A c removal of TEDs at home requiring occasional SBA 2* wrist injury, notes she is recovering well; pt demo F+ ability to move with writer present. EZ Slide and modified heel techniques training for Compression stockings. Pt demo Mod difficulty donning over heel c her traditional tech; post modifcation pt demo increased ease and comfort using modified tech. Task completed with home setup with pt sitting in bedside chair using cross legged method. Regarding doffing writer provided VC for pacing while removing 2* erratic arm movements;pt demo F+ carryover. Pt demo G functional use of EZ Slide and V understanding of use of footstools PRN. Dons slip on shoes c no issues. Transfers  Sit to stand: Modified independent  Stand to sit: Modified independent  Transfer Comments: G safety and sequence noted           Additional Activities: Pt V ID fall prevention strategies regarding doffing compression stocking in the future; reports this is an atypical method/experience regarding kicking her leg up on the sofa for removal.                 Assessment  Performance deficits / Impairments: Decreased functional mobility ; Decreased ADL status; Decreased strength;Decreased endurance;Decreased balance;Decreased high-level IADLs  Assessment: pt has met all OT goals; G insight to modified stocking mgt; G V return of fall prevention strategies  Prognosis: Good  Discharge Recommendations: Home with assist PRN  Activity Tolerance: Patient Tolerated treatment well  Safety Devices in place: Yes  Type of devices: Call light within reach;Gait belt;Patient at risk for falls; Left in bed             Patient Education:     Learner:patient  Method: demonstration, explanation and handout       Outcome: demonstrated understanding and asked questions     Plan  Safety Devices  Safety Devices in place: Yes  Type of devices: Call light within reach, Gait belt, Patient at risk for falls, Left in bed  Plan  Times per week: 2-3  Times per day: Daily  Current Treatment Recommendations: Self-Care / ADL, Home Management Training, Strengthening, Balance Training, Functional Mobility Training, Endurance Training, Safety Education & Training, Patient/Caregiver Education & Training, Equipment Evaluation, Education, & procurement      Goals  Short term goals  Time Frame for Short term goals: By discharge  Short term goal 1: Pt will V/D Good understanding of AE/DME/modified techniques for increased IND with self-care and mobility.  (Goal met)  Short term goal 2: Pt will perform BADLs with MI and Good safety. (goal met)  Short term goal 3: Pt will perform functional mobility and transfers during self-care with MI/IND and Good safety. (goal met)    OT Individual Minutes  Time In: 2708  Time Out: 0908  Minutes: 19      Electronically signed by SHWETA Garcia on 8/7/19 at 9:22 AM

## 2019-08-07 NOTE — PLAN OF CARE
Problem: Discharge Planning:  Goal: Discharged to appropriate level of care  Description  Discharged to appropriate level of care  8/7/2019 0154 by Varun Longoria RN  Outcome: Ongoing  8/6/2019 1646 by Tk Downs RN  Outcome: Ongoing     Problem:  Bowel Function - Altered:  Goal: Bowel elimination is within specified parameters  Description  Bowel elimination is within specified parameters  8/7/2019 0154 by Varun Longoria RN  Outcome: Ongoing  8/6/2019 1646 by Tk Downs RN  Outcome: Ongoing     Problem: Fluid Volume - Imbalance:  Goal: Will show no signs and symptoms of excessive bleeding  Description  Will show no signs and symptoms of excessive bleeding  8/7/2019 0154 by Varun Longoria RN  Outcome: Ongoing  8/6/2019 1646 by kT Downs RN  Outcome: Ongoing  Goal: Absence of imbalanced fluid volume signs and symptoms  Description  Absence of imbalanced fluid volume signs and symptoms  8/7/2019 0154 by Varun Longoria RN  Outcome: Ongoing  8/6/2019 1646 by Tk Downs RN  Outcome: Ongoing     Problem: Nausea/Vomiting:  Goal: Absence of nausea/vomiting  Description  Absence of nausea/vomiting  8/7/2019 0154 by Varun Longoria RN  Outcome: Ongoing  8/6/2019 1646 by Tk Downs RN  Outcome: Ongoing  Goal: Able to drink  Description  Able to drink  8/7/2019 0154 by Varun Longoria RN  Outcome: Ongoing  8/6/2019 1646 by Tk Downs RN  Outcome: Ongoing  Goal: Able to eat  Description  Able to eat  8/7/2019 0154 by Varun Longoria RN  Outcome: Ongoing  8/6/2019 1646 by Tk Downs RN  Outcome: Ongoing  Goal: Ability to achieve adequate nutritional intake will improve  Description  Ability to achieve adequate nutritional intake will improve  8/7/2019 0154 by Varun Longoria RN  Outcome: Ongoing  8/6/2019 1646 by Tk Downs RN  Outcome: Ongoing     Problem: Musculor/Skeletal Functional Status  Goal: Highest potential functional level  8/7/2019 0154 by Deanna Oliver

## 2019-08-07 NOTE — DISCHARGE SUMMARY
08/07/2019          Radiology:    Dexa Bone Density Axial Skeleton    Result Date: 7/9/2019  EXAMINATION: BONE DENSITOMETRY 7/9/2019 10:21 am TECHNIQUE: A bone density dual x-ray absorptiometry (DEXA) scan was performed of the lumbar spine and left hip  on a [employer] system. COMPARISON: None. HISTORY: ORDERING SYSTEM PROVIDED HISTORY: Abnormal findings on diagnostic imaging of limbs FINDINGS: LUMBAR SPINE: The bone mineral density in the lumbar spine including the L1-L4 levels is measured at 1.144 g/cm2, which corresponds to a T-score of -0.4 and a Z-score of 0.8. This is within the normal range by WHO criteria. LEFT HIP: The bone mineral density in the total hip is measured at 0.869 g/cm2 corresponding to a T-score of -1.1 and a Z-score of -0.1. This is within the osteopenia range by WHO criteria. The bone mineral density of the femoral neck is measured at 0.714 g/cm2 corresponding to a T-score of -2.3 and a Z-score of -1.0. This is within the osteopenia range by WHO criteria. WHO fracture risk assessment tool (FRAX) projects a 10-year fracture risk of a major osteoporotic fracture at 19.9 % and hip fracture at 6.4 %. The patient's 10-year fracture risk is increased if untreated. Fracture probability may be lower if the patient has received treatment. FRAX may underestimate fracture probability in patients who have impaired functional status from rheumatoid arthritis, history of frequent falls, history of multiple fractures, severe vertebral fractures, parental history of non-hip fragility fractures, glucocorticoid doses in excess of 7.5 mg/day or frequent use, high dose inhaled glucocorticoids, and if the T-score of the lumbar spine is > 1 SD lower than the femoral neck. Clinical judgment and/or patient preferences may indicate treatment for people with 10 year fracture probabilities above or below these levels. Osteopenia by WHO criteria.      Fluoro For Surgical Procedures    Result call office within 24 hours to cancel.,     Bashir Haile MD  [address] [facility] [address]  [phone]    In 4 weeks  follow-up       Requiring Further Evaluation/Follow Up POST HOSPITALIZATION/Incidental Findings:     Diet: regular diet    Activity: As tolerated    Instructions to Patient: Don't take NSAIDS, Mobic    Discharge Medications:      Medication List      START taking these medications    pantoprazole 40 MG tablet  Commonly known as:  PROTONIX  Take 1 tablet by mouth 2 times daily (before meals)        CHANGE how you take these medications    furosemide 40 MG tablet  Commonly known as:  LASIX  Take 1 tablet by mouth daily  What changed:  how much to take        CONTINUE taking these medications    acetaminophen 500 MG tablet  Commonly known as:  TYLENOL     amLODIPine 10 MG tablet  Commonly known as:  NORVASC  Take 1 tablet by mouth daily     atorvastatin 80 MG tablet  Commonly known as:  LIPITOR  Take 1 tablet by mouth nightly     blood glucose test strips  Test 2 times a day & as needed for symptoms of irregular blood glucose. Calcium 500/D 500-125 MG-UNIT Tabs  Generic drug:  Calcium Carbonate-Vitamin D     carvedilol 12.5 MG tablet  Commonly known as:  COREG  Take 1 tablet by mouth 2 times daily     cholestyramine 4 g packet  Commonly known as:  QUESTRAN  Take 1 packet by mouth 2 times daily     citalopram 10 MG tablet  Commonly known as:  CELEXA  Take 1 tablet by mouth daily     cyanocobalamin 1000 MCG/ML injection  Inject 1 mL into the muscle once for 1 dose     cyclobenzaprine 10 MG tablet  Commonly known as:  FLEXERIL  Take 1 tablet by mouth 3 times daily as needed for Muscle spasms     fenofibrate micronized 134 MG capsule  Commonly known as:  LOFIBRA  Take 1 capsule by mouth every morning (before breakfast)     gabapentin 300 MG capsule  Commonly known as:  NEURONTIN  Take 1 capsule by mouth nightly for 180 days.      glucose monitoring kit monitoring kit  1 kit by Does

## 2019-08-07 NOTE — DISCHARGE INSTR - COC
Manager/ signature: {Esignature:511080088}    PHYSICIAN SECTION    Prognosis: {Prognosis:2873837754}    Condition at Discharge: [address] Patient Condition:026561169}    Rehab Potential (if transferring to Rehab): {Prognosis:8883289872}    Recommended Labs or Other Treatments After Discharge: ***    Physician Certification: I certify the above information and transfer of Jennifer Prather  is necessary for the continuing treatment of the diagnosis listed and that she requires {Admit to Appropriate Level of Care:28343} for {GREATER/LESS:902523900} 30 days.      Update Admission H&P: {CHP DME Changes in FIBWO:733913270}    PHYSICIAN SIGNATURE:  {Esignature:924157551}

## 2019-08-08 ENCOUNTER — CARE COORDINATION (OUTPATIENT)
Dept: CASE MANAGEMENT | Age: 69
End: 2019-08-08

## 2019-08-08 DIAGNOSIS — K57.91 GASTROINTESTINAL HEMORRHAGE ASSOCIATED WITH INTESTINAL DIVERTICULOSIS: Primary | ICD-10-CM

## 2019-08-08 PROCEDURE — 1111F DSCHRG MED/CURRENT MED MERGE: CPT | Performed by: INTERNAL MEDICINE

## 2019-08-08 NOTE — CARE COORDINATION
discharged with any Home Care or Post Acute Services:  No  Post Acute Services:  Home Health (Comment: [facility])  Patient DME:  Moraima Tong, Other  Other Patient DME:  grab bars  Do you have support at home?:  Partner/Spouse/SO  Do you feel like you have everything you need to keep you well at home?:  Yes  Are you an active caregiver in your home?:  No  Care Transitions Interventions         Follow Up  Future Appointments   Date Time Provider [facility] Joanna   8/13/2019  3:30 PM MAIK Smith - CNP [facility]   8/19/2019 11:45 AM Ofelia Zhu MD [address]   9/9/2019  3:00 PM Baldev Bueno MD [facility] Armida Kerr RN

## 2019-08-12 ENCOUNTER — CARE COORDINATION (OUTPATIENT)
Dept: CASE MANAGEMENT | Age: 69
End: 2019-08-12

## 2019-08-12 NOTE — CARE COORDINATION
[facility] Transitions Follow Up Call    2019    Patient: Norris Ramon  Patient : 1950   MRN: [id number]  Reason for Admission: GI Bleed  Discharge Date: 19 RARS: Readmission Risk Score: 18         # 1 attempt- unable to reach patient, left vm message with name and call back number requested call back//JU    Care Transitions Subsequent and Final Call    Subsequent and Final Calls  Are you currently active with any services?:  Home Health  Care Transitions Interventions  Other Interventions:             Follow Up  Future Appointments   Date Time Provider Britni Marshall   2019  3:30 PM MAIK Nick CNP [facility]   2019  8:20 AM MAIK Wong CNP [facility] None   2019 11:45 AM [facility]   2019  3:00 PM Sejal Khoury MD [facility] Memo Whitehead RN

## 2019-08-13 ENCOUNTER — CARE COORDINATION (OUTPATIENT)
Dept: CASE MANAGEMENT | Age: 69
End: 2019-08-13

## 2019-08-13 ENCOUNTER — OFFICE VISIT (OUTPATIENT)
Dept: INTERNAL MEDICINE CLINIC | Age: 69
End: 2019-08-13
Payer: MEDICARE

## 2019-08-13 VITALS
HEIGHT: 63 IN | DIASTOLIC BLOOD PRESSURE: 60 MMHG | SYSTOLIC BLOOD PRESSURE: 110 MMHG | BODY MASS INDEX: 32.6 KG/M2 | WEIGHT: 184 LBS

## 2019-08-13 DIAGNOSIS — K57.92 DIVERTICULITIS: ICD-10-CM

## 2019-08-13 DIAGNOSIS — E55.9 VITAMIN D DEFICIENCY: ICD-10-CM

## 2019-08-13 DIAGNOSIS — G89.29 CHRONIC BILATERAL LOW BACK PAIN WITH LEFT-SIDED SCIATICA: ICD-10-CM

## 2019-08-13 DIAGNOSIS — I10 HYPERTENSION, UNSPECIFIED TYPE: ICD-10-CM

## 2019-08-13 DIAGNOSIS — E78.5 HYPERLIPIDEMIA, UNSPECIFIED HYPERLIPIDEMIA TYPE: ICD-10-CM

## 2019-08-13 DIAGNOSIS — D64.9 ANEMIA, UNSPECIFIED TYPE: ICD-10-CM

## 2019-08-13 DIAGNOSIS — M54.42 CHRONIC BILATERAL LOW BACK PAIN WITH LEFT-SIDED SCIATICA: ICD-10-CM

## 2019-08-13 DIAGNOSIS — K92.2 GASTROINTESTINAL HEMORRHAGE, UNSPECIFIED GASTROINTESTINAL HEMORRHAGE TYPE: ICD-10-CM

## 2019-08-13 DIAGNOSIS — Z09 HOSPITAL DISCHARGE FOLLOW-UP: Primary | ICD-10-CM

## 2019-08-13 DIAGNOSIS — G25.81 RESTLESS LEGS SYNDROME (RLS): ICD-10-CM

## 2019-08-13 PROCEDURE — 1111F DSCHRG MED/CURRENT MED MERGE: CPT | Performed by: NURSE PRACTITIONER

## 2019-08-13 PROCEDURE — 99495 TRANSJ CARE MGMT MOD F2F 14D: CPT | Performed by: NURSE PRACTITIONER

## 2019-08-13 RX ORDER — CARVEDILOL 12.5 MG/1
12.5 TABLET ORAL 2 TIMES DAILY
Qty: 180 TABLET | Refills: 3 | Status: ON HOLD | OUTPATIENT
Start: 2019-08-13 | End: 2020-02-24 | Stop reason: HOSPADM

## 2019-08-13 RX ORDER — PRAMIPEXOLE DIHYDROCHLORIDE 1 MG/1
1.5 TABLET ORAL DAILY
Qty: 145 TABLET | Refills: 3 | Status: SHIPPED | OUTPATIENT
Start: 2019-08-13 | End: 2019-11-26 | Stop reason: SDUPTHER

## 2019-08-13 RX ORDER — LISINOPRIL 10 MG/1
10 TABLET ORAL DAILY
Qty: 90 TABLET | Refills: 3 | Status: SHIPPED | OUTPATIENT
Start: 2019-08-13 | End: 2019-11-26 | Stop reason: SDUPTHER

## 2019-08-13 RX ORDER — SPIRONOLACTONE 25 MG/1
25 TABLET ORAL DAILY
Qty: 90 TABLET | Refills: 3 | Status: ON HOLD | OUTPATIENT
Start: 2019-08-13 | End: 2019-08-26 | Stop reason: HOSPADM

## 2019-08-13 RX ORDER — FENOFIBRATE 134 MG/1
134 CAPSULE ORAL
Qty: 90 CAPSULE | Refills: 3 | Status: SHIPPED | OUTPATIENT
Start: 2019-08-13 | End: 2019-11-26 | Stop reason: SDUPTHER

## 2019-08-13 NOTE — PROGRESS NOTES
Visit Information    Have you changed or started any medications since your last visit including any over-the-counter medicines, vitamins, or herbal medicines? no   Are you having any side effects from any of your medications? -  no  Have you stopped taking any of your medications? Is so, why? -  no    Have you seen any other physician or provider since your last visit? No  Have you had any other diagnostic tests since your last visit? Yes - Records Obtained  Have you been seen in the emergency room and/or had an admission to a hospital since we last saw you? Yes - Records Obtained  Have you had your routine dental cleaning in the past 6 months? no    Have you activated your [employer] account? If not, what are your barriers? Yes     Patient Care Team:  Migel Brody MD as PCP - Bouchra Schwartz MD as PCP - [facility]  Walt Lucia RN as Care Transition    Medical History Review  Past Medical, Family, and Social History reviewed and does contribute to the patient presenting condition    Health Maintenance   Topic Date Due    Diabetic foot exam  01/18/1961    Diabetic retinal exam  01/18/1961    DTaP/Tdap/Td vaccine (1 - Tdap) 01/18/1970    Shingles Vaccine (1 of 2) 01/18/2001    Annual Wellness Visit (AWV)  01/18/2014    Breast cancer screen  08/04/2019    Flu vaccine (1) 09/01/2019    Lipid screen  05/20/2020    A1C test (Diabetic or Prediabetic)  06/11/2020    Potassium monitoring  08/07/2020    Creatinine monitoring  08/07/2020    Colon cancer screen colonoscopy  10/07/2026    DEXA (modify frequency per FRAX score)  Completed    Pneumococcal 65+ years Vaccine  Completed    Hepatitis C screen  Completed      Post-Discharge Transitional Care Management Services or Hospital Follow Up      Sherri Munguia   YOB: 1950    Date of Office Visit:  8/13/2019  Date of Hospital Admission: 8/3/19  Date of Hospital Discharge: 8/7/19  Readmission Risk Score(high >=14%.  Medium >=10%):Readmission Risk Score: 18      Care management risk score Rising risk (score 2-5) and Complex Care (Scores >=6): 4     Non face to face  following discharge, date last encounter closed (first attempt may have been earlier): 8/8/2019 12:24 PM 8/8/2019 12:24 PM    Call initiated 2 business days of discharge: Yes     Patient Active Problem List   Diagnosis    Other specified disorders of rotator cuff syndrome of shoulder and allied disorders    Diverticulosis of large intestine    Intestinal or peritoneal adhesions with obstruction (postoperative) (postinfection) ([address]    Restless legs syndrome (RLS)    GERD (gastroesophageal reflux disease)    Essential hypertension    Mixed hyperlipidemia    Other abnormal glucose    Atherosclerosis    Lateral epicondylitis    Allergic rhinitis    Vitamin D deficiency    Depression    Degenerative joint disease (DJD) of hip    Edema extremities    Injury of foot, left    Facial droop    CVA (cerebral vascular accident) ([address]    BIN (acute kidney injury) ([address]    Bradycardia    Ataxia    Aphasia    TIA (transient ischemic attack)    Need for prophylactic vaccination and inoculation against cholera alone    Chest pain    Osteopenia    Lumbago    Facet arthritis of lumbar region    Meralgia paresthetica    Lumbar degenerative disc disease    Spondylosis of lumbar region without myelopathy or radiculopathy    Blood poisoning    Cellulitis of left lower extremity    Encounter for medication monitoring    Deep vein thrombosis (DVT) of right lower extremity ([address]    Lumbar facet arthropathy    Shortness of breath    Chronic deep vein thrombosis (DVT) of proximal vein of both lower extremities (HCC)    Chronic diastolic heart failure (HCC)    Neurogenic claudication due to lumbar spinal stenosis    Sacroiliitis ([address]    Stage 3 chronic kidney disease ([address]    Stage 3 chronic kidney disease ([address]    Type 2 diabetes mellitus with circulatory Abd pain improved, follow up with GI for colonoscopy         Restless legs syndrome (RLS)        Stable, continue Mirapex    pramipexole (MIRAPEX) 1 MG tablet [86941]           Hypertension, unspecified type        BP at goal, continue lisinopril, Coreg, Lasix, aldactone. spironolactone (ALDACTONE) 25 MG tablet [4013]      lisinopril (PRINIVIL;ZESTRIL) 10 MG tablet [18894]      carvedilol (COREG) 12.5 MG tablet [89066]           Hyperlipidemia, unspecified hyperlipidemia type        Stable, continue statin    fenofibrate micronized (LOFIBRA) 134 MG capsule [82315]           Vitamin D deficiency        Patient requesting Vit D level.     Vitamin D 25 Hydroxy [10329 Custom]   - Future Order         Anemia, unspecified type        H&H trending up, check CBC prior to next visit    CBC [24130 Custom]   - Future Order         Chronic bilateral low back pain with left-sided sciatica        Patient with pain clinic appt tomorrow morning                 Medical Decision Making: moderate complexity

## 2019-08-13 NOTE — CARE COORDINATION
[facility] Transitions Follow Up Call    2019    Patient: Kathy Zhao  Patient : 1950   MRN: [id number]  Reason for Admission: GI hemorrhage   Discharge Date: 19 RARS: Readmission Risk Score: 18         Spoke with: Mackenzie Graff called and stated that she just checked her voice mail. She said she was doing \"good\". She denied any s/s of bleeding, N/V, dizziness/lightheadedness,or pain. She has an appointment with her PCP today at 330. Her weight is steady and her blood sugar was 102. No concerns or questions. Encouraged her to call if need arises. Care Transitions Subsequent and Final Call    Subsequent and Final Calls  Do you have any ongoing symptoms?:  No  Have your medications changed?:  No  Do you have any questions related to your medications?:  No  Do you currently have any active services?:  Yes  Are you currently active with any services?:  Home Health  Do you have any needs or concerns that I can assist you with?:  No  Care Transitions Interventions  Other Interventions:             Follow Up  Future Appointments   Date Time Provider Britni Marshall   2019  3:30 PM MAIK Brennan CNP [facility]   2019  8:20 AM MAIK Garibay CNP [facility] None   2019 11:45 AM Yosef Vinson MD [facility]   2019  3:00 PM Stu Cisneros MD [facility] Benjamine Severs, RN

## 2019-08-14 ENCOUNTER — HOSPITAL ENCOUNTER (OUTPATIENT)
Dept: PAIN MANAGEMENT | Age: 69
Discharge: HOME | End: 2019-08-14
Payer: MEDICARE

## 2019-08-14 ENCOUNTER — OFFICE VISIT (OUTPATIENT)
Dept: INTERNAL MEDICINE CLINIC | Age: 69
End: 2019-08-14
Payer: MEDICARE

## 2019-08-14 VITALS
HEIGHT: 63 IN | WEIGHT: 184 LBS | RESPIRATION RATE: 16 BRPM | DIASTOLIC BLOOD PRESSURE: 39 MMHG | OXYGEN SATURATION: 96 % | BODY MASS INDEX: 32.6 KG/M2 | TEMPERATURE: 98.1 F | SYSTOLIC BLOOD PRESSURE: 92 MMHG | HEART RATE: 53 BPM

## 2019-08-14 VITALS
DIASTOLIC BLOOD PRESSURE: 45 MMHG | BODY MASS INDEX: 30.3 KG/M2 | SYSTOLIC BLOOD PRESSURE: 95 MMHG | HEART RATE: 58 BPM | WEIGHT: 171 LBS | HEIGHT: 63 IN

## 2019-08-14 DIAGNOSIS — M47.816 SPONDYLOSIS OF LUMBAR REGION WITHOUT MYELOPATHY OR RADICULOPATHY: ICD-10-CM

## 2019-08-14 DIAGNOSIS — M47.816 LUMBAR FACET ARTHROPATHY: ICD-10-CM

## 2019-08-14 DIAGNOSIS — Z51.81 ENCOUNTER FOR MEDICATION MONITORING: ICD-10-CM

## 2019-08-14 DIAGNOSIS — G89.29 CHRONIC BILATERAL LOW BACK PAIN WITH LEFT-SIDED SCIATICA: ICD-10-CM

## 2019-08-14 DIAGNOSIS — Z79.899 POLYPHARMACY: ICD-10-CM

## 2019-08-14 DIAGNOSIS — M54.42 CHRONIC BILATERAL LOW BACK PAIN WITH LEFT-SIDED SCIATICA: ICD-10-CM

## 2019-08-14 DIAGNOSIS — G89.29 CHRONIC BILATERAL LOW BACK PAIN, WITH SCIATICA PRESENCE UNSPECIFIED: ICD-10-CM

## 2019-08-14 DIAGNOSIS — M46.1 SACROILIITIS (HCC): ICD-10-CM

## 2019-08-14 DIAGNOSIS — M47.816 FACET ARTHRITIS OF LUMBAR REGION: ICD-10-CM

## 2019-08-14 DIAGNOSIS — G57.11 MERALGIA PARESTHETICA OF RIGHT SIDE: Primary | ICD-10-CM

## 2019-08-14 DIAGNOSIS — M54.5 CHRONIC BILATERAL LOW BACK PAIN, WITH SCIATICA PRESENCE UNSPECIFIED: ICD-10-CM

## 2019-08-14 DIAGNOSIS — R40.0 DROWSINESS: ICD-10-CM

## 2019-08-14 DIAGNOSIS — I95.9 HYPOTENSION, UNSPECIFIED HYPOTENSION TYPE: Primary | ICD-10-CM

## 2019-08-14 PROCEDURE — G8399 PT W/DXA RESULTS DOCUMENT: HCPCS | Performed by: NURSE PRACTITIONER

## 2019-08-14 PROCEDURE — 99213 OFFICE O/P EST LOW 20 MIN: CPT

## 2019-08-14 PROCEDURE — 1111F DSCHRG MED/CURRENT MED MERGE: CPT | Performed by: NURSE PRACTITIONER

## 2019-08-14 PROCEDURE — 1090F PRES/ABSN URINE INCON ASSESS: CPT | Performed by: NURSE PRACTITIONER

## 2019-08-14 PROCEDURE — 4040F PNEUMOC VAC/ADMIN/RCVD: CPT | Performed by: NURSE PRACTITIONER

## 2019-08-14 PROCEDURE — G8598 ASA/ANTIPLAT THER USED: HCPCS | Performed by: NURSE PRACTITIONER

## 2019-08-14 PROCEDURE — 3017F COLORECTAL CA SCREEN DOC REV: CPT | Performed by: NURSE PRACTITIONER

## 2019-08-14 PROCEDURE — 1036F TOBACCO NON-USER: CPT | Performed by: NURSE PRACTITIONER

## 2019-08-14 PROCEDURE — 99213 OFFICE O/P EST LOW 20 MIN: CPT | Performed by: NURSE PRACTITIONER

## 2019-08-14 PROCEDURE — 1123F ACP DISCUSS/DSCN MKR DOCD: CPT | Performed by: NURSE PRACTITIONER

## 2019-08-14 PROCEDURE — G8427 DOCREV CUR MEDS BY ELIG CLIN: HCPCS | Performed by: NURSE PRACTITIONER

## 2019-08-14 PROCEDURE — G8417 CALC BMI ABV UP PARAM F/U: HCPCS | Performed by: NURSE PRACTITIONER

## 2019-08-14 PROCEDURE — 99214 OFFICE O/P EST MOD 30 MIN: CPT | Performed by: NURSE PRACTITIONER

## 2019-08-14 RX ORDER — HYDRALAZINE HYDROCHLORIDE 50 MG/1
50 TABLET, FILM COATED ORAL 3 TIMES DAILY
Qty: 90 TABLET | Refills: 1
Start: 2019-08-14 | End: 2019-09-26 | Stop reason: SDUPTHER

## 2019-08-14 RX ORDER — OXYCODONE AND ACETAMINOPHEN 7.5; 325 MG/1; MG/1
1 TABLET ORAL EVERY 8 HOURS PRN
Qty: 90 TABLET | Refills: 0 | Status: SHIPPED | OUTPATIENT
Start: 2019-08-18 | End: 2019-10-09 | Stop reason: SDUPTHER

## 2019-08-14 RX ORDER — AMLODIPINE BESYLATE 5 MG/1
5 TABLET ORAL DAILY
Qty: 30 TABLET | Refills: 1
Start: 2019-08-14 | End: 2019-09-26

## 2019-08-14 ASSESSMENT — ENCOUNTER SYMPTOMS
EYE DISCHARGE: 0
COLOR CHANGE: 0
SHORTNESS OF BREATH: 0
ABDOMINAL PAIN: 0
SHORTNESS OF BREATH: 0
BACK PAIN: 1
COUGH: 0
CONSTIPATION: 0
BACK PAIN: 1
VOMITING: 0
WHEEZING: 0
COUGH: 0
WHEEZING: 0
BACK PAIN: 1
BLOOD IN STOOL: 0
NAUSEA: 0
ANAL BLEEDING: 0
NAUSEA: 0
EYE DISCHARGE: 0
EYE REDNESS: 0
RESPIRATORY NEGATIVE: 1
GASTROINTESTINAL NEGATIVE: 1

## 2019-08-14 NOTE — PROGRESS NOTES
Team:  Loren Mittal MD as PCP - Italo Stern MD as PCP - REHABILITATION [facility] Empaneled Provider  Jo Esparza RN as Care Transition    REASON FOR VISIT: Other (BP low in pain clinic today )        HISTORY OF PRESENT ILLNESS:      History was obtained from the patient and . Flip Guerra is a 77 y.o. is here for low BP noted at pain clinic appt earlier this morning.  states that this morning she was slurring her words, was very drowsy and unsteady on her feet, and he was worried that she either was having a stroke or that she took too many pain pills. She sees pain management for chronic low back pain and meralgia paresthetica of right side. She states that she told pain clinic provider that the Percocet is not really helping her pain, and her dose was increased today. She states that she did take all of her pills as ordered this morning. At time of exam, she is at her baseline, speech is clear and appropriate, no deficits noted. She is asymptomatic with hypotension, denying any dizziness or lightheadedness. Medications reviewed, she is on multiple antihypertensives and multiple meds which could be contributing to excess sedation and hypotension.      Patient Active Problem List   Diagnosis    Other specified disorders of rotator cuff syndrome of shoulder and allied disorders    Diverticulosis of large intestine    Intestinal or peritoneal adhesions with obstruction (postoperative) (postinfection) ([address]    Restless legs syndrome (RLS)    GERD (gastroesophageal reflux disease)    Essential hypertension    Mixed hyperlipidemia    Other abnormal glucose    Atherosclerosis    Lateral epicondylitis    Allergic rhinitis    Vitamin D deficiency    Depression    Degenerative joint disease (DJD) of hip    Edema extremities    Injury of foot, left    Facial droop    CVA (cerebral vascular accident) ([address]    BIN (acute kidney injury) ([address]    Bradycardia    Ataxia    Aphasia    TIA spironolactone (ALDACTONE) 25 MG tablet Take 1 tablet by mouth daily 90 tablet 3    pramipexole (MIRAPEX) 1 MG tablet Take 1.5 tablets by mouth daily 145 tablet 3    lisinopril (PRINIVIL;ZESTRIL) 10 MG tablet Take 1 tablet by mouth daily 90 tablet 3    carvedilol (COREG) 12.5 MG tablet Take 1 tablet by mouth 2 times daily 180 tablet 3    fenofibrate micronized (LOFIBRA) 134 MG capsule Take 1 capsule by mouth every morning (before breakfast) 90 capsule 3    pantoprazole (PROTONIX) 40 MG tablet Take 1 tablet by mouth 2 times daily (before meals) 90 tablet 1    oxyCODONE-acetaminophen (PERCOCET) 5-325 MG per tablet Take 1 tablet by mouth 2 times daily as needed for Pain for up to 30 days. 60 tablet 0    blood glucose monitor strips Test 2 times a day & as needed for symptoms of irregular blood glucose. 100 strip 5    Lancets MISC 1 each by Does not apply route daily 100 each 3    citalopram (CELEXA) 10 MG tablet Take 1 tablet by mouth daily 90 tablet 3    glucose monitoring kit (FREESTYLE) monitoring kit 1 kit by Does not apply route daily 1 kit 0    cholestyramine (QUESTRAN) 4 g packet Take 1 packet by mouth 2 times daily 90 packet 3    SITagliptin (JANUVIA) 100 MG tablet Take 1 tablet by mouth daily 90 tablet 3    acetaminophen (TYLENOL) 500 MG tablet Take 500 mg by mouth every 6 hours as needed for Pain      gabapentin (NEURONTIN) 300 MG capsule Take 1 capsule by mouth nightly for 180 days. 90 capsule 1    lidocaine (XYLOCAINE) 5 % ointment 4-5 times a day to your right thigh for pain.  240 g 3    furosemide (LASIX) 40 MG tablet Take 1 tablet by mouth daily 90 tablet 2    atorvastatin (LIPITOR) 80 MG tablet Take 1 tablet by mouth nightly 90 tablet 3    nitroGLYCERIN (NITROSTAT) 0.4 MG SL tablet Place 1 tablet under the tongue every 5 minutes as needed for Chest pain 75 tablet 3    vitamin D (CHOLECALCIFEROL) 1000 UNIT TABS tablet Take 1 tablet by mouth daily 90 tablet 3    Calcium Carbonate-Vitamin D (CALCIUM 500/D) 500-125 MG-UNIT TABS Take 1 tablet by mouth 2 times daily        No current facility-administered medications for this visit. SOCIAL HISTORY    Reviewed and updated. Yue Goldberg  reports that she quit smoking about 2 years ago. Her smoking use included cigarettes. She started smoking about 24 years ago. She has a 10.00 pack-year smoking history. She has never used smokeless tobacco.    FAMILY HISTORY:    Reviewed and updated. family history includes Diabetes in her mother; Heart Disease in her brother, father, maternal grandmother, and mother; High Blood Pressure in her brother, mother, and sister; Stroke in her mother. REVIEW OF SYSTEMS:      Review of Systems   Constitutional: Positive for fatigue. Negative for chills and fever. Eyes: Negative for discharge and visual disturbance. Respiratory: Negative for cough, shortness of breath and wheezing. Cardiovascular: Positive for leg swelling. Negative for chest pain and palpitations.  states she does not elevate her legs, and will lie in a chair with her feet down and her head on one side all day   Gastrointestinal: Negative for nausea and vomiting. Genitourinary: Negative for difficulty urinating and dysuria. Musculoskeletal: Positive for arthralgias, back pain and gait problem. Neurological: Negative for dizziness, syncope, speech difficulty, light-headedness and headaches. Psychiatric/Behavioral: The patient is nervous/anxious. PHYSICAL EXAM:      Vitals:    08/14/19 1045   BP: (!) 95/45   Pulse: 58   Weight: 171 lb (77.6 kg)   Height: 5' 3\" (1.6 m)     BP Readings from Last 3 Encounters:   08/14/19 (!) 95/45   08/14/19 (!) 92/39   08/13/19 110/60      Physical Exam   Constitutional: She is oriented to person, place, and time. She appears well-developed and well-nourished. No distress. Eyes: Pupils are equal, round, and reactive to light.  Conjunctivae and EOM are normal. Right eye

## 2019-08-14 NOTE — PROGRESS NOTES
[address] PAIN CLINIC PROGRESS NOTE      Patient here today to review Medication Agreement    Chief Complaint:  BACK PAIN       HPI:  She returns   following left si joint injection ON 7-29-19 WHICH DID NOT HELP. She states the only relief she gets when she lays down, The pain radiates down her whole left leg. She saw Dr Dennie Nim and states surgery was not recommended. She has not had any lumbar surgery, PT did not help. She states her sleep is fair,. Her activity is limited. She is using a walker now, states her  drove her today. She was admitted 4 days for GI bleed, she received a blood transfusion. She can no longer take an NSAID for her pain. She had EGD done and states has an ulcer. Back Pain   This is a chronic problem. The current episode started more than 1 year ago. The problem occurs constantly. The problem has been gradually worsening since onset. The pain is present in the lumbar spine. The quality of the pain is described as aching. Radiates to: left leg pain 10. The pain is at a severity of 5/10 (low back). The symptoms are aggravated by standing (walking). Associated symptoms include numbness. (Right thigh, weakness legs) Risk factors include menopause, obesity and sedentary lifestyle. She has tried ice, analgesics and bed rest for the symptoms. The treatment provided mild relief. Patient denies any new neurological symptoms. No bowel or bladder incontinence, no weakness, and no falling. Treatment goals:  Functional status: reduce pain 2      Aberrancy:   Any alcoholic beverages   no    Any illegal drugs   no      Analgesia:5, 10      Adverse  Effects :non    ADL;s :limited        Pill count: appropriate 15 percocet tabs    Morphine equivalent dose as reported on [place]AR[place]:15  Periodic Controlled Substance Monitoring: Possible medication side effects, risk of tolerance/dependence & alternative treatments discussed., No signs of potential drug abuse or diversion identified. , Assessed functional status., Obtaining appropriate analgesic effect of treatment. Bernett Rinne, APRN - CNP)  Review ofOARRS does not show any aberrant prescription behavior. Medication is helping the patient stay active. Patient denies any side effects and reports adequate analgesia. No sign of misuse/abuse. As above, I did review the imaging       When was thelast UDS:    5-14-19         Was the UDS appropriate:yes        Record/Diagnostics Review:       As above, I did review the imaging     5/17/2019 12:40 PM - Luciano, Melbapn Incoming Lab Results From [facility]      Component Value Ref Range & Units Status Collected Lab   Pain Management Drug Panel Interp, Urine Consistent    Final 05/14/2019  9:00 AM [facility]   (NOTE)   ________________________________________________________________   DRUGS EXPECTED:   HYDROCODONE [5/12/19]   ________________________________________________________________   CONSISTENT with medications provided:   HYDROCODONE : based on the absence of hydrocodone and metabolites   ________________________________________________________________   INTERPRETIVE INFORMATION: Pain Mgt [place], [place] Spec/EMIT, Ur,                            Interp   Interpretation depends on accuracy and completeness of patient   medication information submitted by client.     6-Acetylmorphine, Ur Not Detected    Final 05/14/2019  9:00 AM [facility]   7-Aminoclonazepam, Urine Not Detected    Final 05/14/2019  9:00 AM [facility]   Alpha-OH-Alpraz, Urine Not Detected    Final 05/14/2019  9:00 AM [facility]   Alprazolam, Urine Not Detected    Final 05/14/2019  9:00 AM [facility]   Amphetamines, urine Not Detected    Final 05/14/2019  9:00 AM [facility]   Barbiturates, Ur Not Detected    Final 05/14/2019  9:00 AM [facility]   Benzoylecgonine, Ur Not Detected    Final 05/14/2019  9:00 AM [facility]   Buprenorphine Urine Not Detected    Final 05/14/2019  9:00 AM [facility]   Carisoprodol, Ur Not Detected    Final 05/14/2019  9:00 AM [facility]   (NOTE)   The carisoprodol Administration has not approved or cleared this test; however, FDA   clearance or approval is not currently required for clinical use. The results are not intended to be used as the sole means for   clinical diagnosis or patient management decisions. EER Hi Res Interp Ur See Note    Final 05/14/2019  9:00 AM [facility]   (NOTE)   Access [facility] Enhanced Report using either link below:   -Direct access: [email or web address]   -Enter Username, Password: [email or web address]   Username: [email or web address]   Password: [email or web address] Performed by [place] [id number], [address] [facility] [phone]   www. Riana Chavez MD, Lab.  Director                   Past Medical History:   Diagnosis Date    Allergic rhinitis, cause unspecified     Back pain     lumbar    Bowel obstruction (HCC)     history of due to scar tissue, resolved non-surgically    CAD (coronary artery disease)     Cellulitis     left leg    Cellulitis 2017 August    leg left leg/bug bite    Diverticulosis of colon (without mention of hemorrhage)     GERD (gastroesophageal reflux disease)     History of MI (myocardial infarction) 2005    thought due to a blood clot    History of ovarian cyst 1970    had oopherectomy    History of peritonitis 1968    due to ruptured appendix age 13    HTN (hypertension)     Hx of blood clots     right leg    Hyperlipidemia     Intestinal or peritoneal adhesions with obstruction (postoperative) (postinfection) ([address]     Kidney infection     renal failure/sepsis/spider bite    Lateral epicondylitis  of elbow     Muscle strain     right posterior shoulder    Other abnormal glucose     Restless legs syndrome (RLS)     Vitamin D deficiency     Wears glasses        Past Surgical History:   Procedure Laterality Date    ABDOMEN SURGERY  1976    benign tumor removed near remaining overy, 1.5 pounds    APPENDECTOMY  1968    appendix ruptured, developed peritonitis    BUNIONECTOMY Left     along with calcium deposits removed   [facility] CARDIAC CATHETERIZATION  2005    negative    COLECTOMY  1969    12 INCHES REMOVED D/T OBSTRUCTION    COLONOSCOPY      CYST REMOVAL Right     right facial    HYSTERECTOMY  1973    taken as a result of recurring cysts    OTHER SURGICAL HISTORY  8/14/14    FESS    OVARY REMOVAL  1970    UNILATERAL due to cyst    OVARY REMOVAL  1971    partial, due to cyst    SINUS SURGERY  2004    UPPER GASTROINTESTINAL ENDOSCOPY N/A 5/31/2019    EGD ESOPHAGOGASTRODUODENOSCOPY performed by Baldev Bueno MD at [address] N/A 8/5/2019    EGD BIOPSY performed by Artemio Niño MD at [address] 3/5/2019    WRIST OPEN REDUCTION INTERNAL FIXATION performed by Kenan Del Castillo MD at [facility]   Allergen Reactions    Bactrim [Sulfamethoxazole-Trimethoprim] Other (See Comments)     sepsis    Codeine Itching    Seasonal          Current Outpatient Medications:     [START ON 8/18/2019] oxyCODONE-acetaminophen (PERCOCET) 7.5-325 MG per tablet, Take 1 tablet by mouth every 8 hours as needed for Pain for up to 30 days. , Disp: 90 tablet, Rfl: 0    spironolactone (ALDACTONE) 25 MG tablet, Take 1 tablet by mouth daily, Disp: 90 tablet, Rfl: 3    pramipexole (MIRAPEX) 1 MG tablet, Take 1.5 tablets by mouth daily, Disp: 145 tablet, Rfl: 3    lisinopril (PRINIVIL;ZESTRIL) 10 MG tablet, Take 1 tablet by mouth daily, Disp: 90 tablet, Rfl: 3    carvedilol (COREG) 12.5 MG tablet, Take 1 tablet by mouth 2 times daily, Disp: 180 tablet, Rfl: 3    fenofibrate micronized (LOFIBRA) 134 MG capsule, Take 1 capsule by mouth every morning (before breakfast), Disp: 90 capsule, Rfl: 3    pantoprazole (PROTONIX) 40 MG tablet, Take 1 tablet by mouth 2 times daily (before meals), Disp: 90 tablet, Rfl: 1    oxyCODONE-acetaminophen (PERCOCET) 5-325 MG per tablet, Take 1 tablet by mouth 2 times daily as needed for Pain for up to 30 days. , Disp: 60 tablet, Rfl: 0    citalopram (CELEXA) 10 MG tablet, Take 1 tablet by mouth daily, Disp: 90 tablet, Rfl: 3    cholestyramine (QUESTRAN) 4 g packet, Take 1 packet by mouth 2 times daily, Disp: 90 packet, Rfl: 3    gabapentin (NEURONTIN) 300 MG capsule, Take 1 capsule by mouth nightly for 180 days. , Disp: 90 capsule, Rfl: 1    lidocaine (XYLOCAINE) 5 % ointment, 4-5 times a day to your right thigh for pain., Disp: 240 g, Rfl: 3    furosemide (LASIX) 40 MG tablet, Take 1 tablet by mouth daily, Disp: 90 tablet, Rfl: 2    atorvastatin (LIPITOR) 80 MG tablet, Take 1 tablet by mouth nightly, Disp: 90 tablet, Rfl: 3    vitamin D (CHOLECALCIFEROL) 1000 UNIT TABS tablet, Take 1 tablet by mouth daily, Disp: 90 tablet, Rfl: 3    Calcium Carbonate-Vitamin D (CALCIUM 500/D) 500-125 MG-UNIT TABS, Take 1 tablet by mouth 2 times daily , Disp: , Rfl:     amLODIPine (NORVASC) 5 MG tablet, Take 1 tablet by mouth daily, Disp: 30 tablet, Rfl: 1    hydrALAZINE (APRESOLINE) 50 MG tablet, Take 1 tablet by mouth 3 times daily, Disp: 90 tablet, Rfl: 1    blood glucose monitor strips, Test 2 times a day & as needed for symptoms of irregular blood glucose., Disp: 100 strip, Rfl: 5    Lancets MISC, 1 each by Does not apply route daily, Disp: 100 each, Rfl: 3    glucose monitoring kit (FREESTYLE) monitoring kit, 1 kit by Does not apply route daily, Disp: 1 kit, Rfl: 0    SITagliptin (JANUVIA) 100 MG tablet, Take 1 tablet by mouth daily, Disp: 90 tablet, Rfl: 3    acetaminophen (TYLENOL) 500 MG tablet, Take 500 mg by mouth every 6 hours as needed for Pain, Disp: , Rfl:     nitroGLYCERIN (NITROSTAT) 0.4 MG SL tablet, Place 1 tablet under the tongue every 5 minutes as needed for Chest pain, Disp: 75 tablet, Rfl: 3    Family History   Problem Relation Age of Onset    Stroke Mother     Diabetes Mother     Heart Disease Mother     High Blood Pressure Mother    Giuliana Fuentes Heart Disease Father     Heart Disease Brother     High Blood Pressure Brother     Heart Disease Maternal Grandmother     High Blood Pressure Sister        Social History     Socioeconomic History    Marital status:      Spouse name: Not on file    Number of children: Not on file    Years of education: Not on file    Highest education level: Not on file   Occupational History    Occupation: retired   Social Needs    Financial resource strain: Not on file    Food insecurity:     Worry: Not on file     Inability: Not on file   [place] needs:     Medical: Not on file     Non-medical: Not on file   Tobacco Use    Smoking status: Former Smoker     Packs/day: 0.50     Years: 20.00     Pack years: 10.00     Types: Cigarettes     Start date: 1995     Last attempt to quit: 2017     Years since quittin.1    Smokeless tobacco: Never Used    Tobacco comment: 18 quit 10 days ago   Substance and Sexual Activity    Alcohol use: No     Alcohol/week: 0.0 standard drinks     Comment: occasionally    Drug use: No    Sexual activity: Not on file   Lifestyle    Physical activity:     Days per week: Not on file     Minutes per session: Not on file    Stress: Not on file   Relationships    Social connections:     Talks on phone: Not on file     Gets together: Not on file     Attends [employer] service: Not on file     Active member of club or organization: Not on file     Attends meetings of clubs or organizations: Not on file     Relationship status: Not on file    Intimate partner violence:     Fear of current or ex partner: Not on file     Emotionally abused: Not on file     Physically abused: Not on file     Forced sexual activity: Not on file   Other Topics Concern    Not on file   Social History Narrative    Not on file       Review of Systems:  Review of Systems   Constitution: Negative. HENT: Negative. Cardiovascular: Positive for leg swelling. Respiratory: Negative. Endocrine:        Blood sugars low 100 range   Hematologic/Lymphatic: Bruises/bleeds easily. Skin: Negative. Musculoskeletal: Positive for back pain and joint pain. Gastrointestinal: Negative. Genitourinary: Negative. Neurological: Positive for loss of balance and numbness. Walker   Psychiatric/Behavioral: Positive for depression. Managing         Physical Exam:  BP (!) 92/39   Pulse 53   Temp 98.1 °F (36.7 °C) (Oral)   Resp 16   Ht 5' 3\" (1.6 m)   Wt 184 lb (83.5 kg)   SpO2 96%   BMI 32.59 kg/m²     Physical Exam   Constitutional: She appears well-developed. obese   HENT:   Head: Normocephalic. Neck: Normal range of motion. Neck supple. Cardiovascular:   Swelling lower extremities greater on left    Pulmonary/Chest: Effort normal.   Musculoskeletal:        Lumbar back: She exhibits decreased range of motion and tenderness. Neurological: She is alert. She has normal strength. Gait abnormal.   Reflex Scores:       Patellar reflexes are 1+ on the right side and 1+ on the left side. Achilles reflexes are 1+ on the right side and 1+ on the left side. Gait antalgic, using a walker  SLR positive left   Skin:        Psychiatric: Her speech is normal and behavior is normal. Judgment and thought content normal. Cognition and memory are normal. She exhibits a depressed mood.      VL DUP LOWER EXTREMITY VENOUS LEFT   Status: Final result   Order Providers     Mingo Simms MD          Signed by     Signed Date/Time  Phone Pager   RESULT, UNKNOWN PROVIDER 8/04/2019 15:04     Reading Providers     Read Date Phone Pager   RESULT, UNKNOWN PROVIDER Aug 4, 2019     Routing History     Priority Sent On From To Message Type    8/6/2019  3:13 PM Maria Guadalupe Wolf Incoming Lab Results From Puja Austin MD CC'd Results    8/5/2019  5:42 AM Maria Guadalupe Wolf Incoming Lab Results From [facility] THE [facility], MD CC'd Results   Radiation Dose Estimates     No radiation

## 2019-08-15 ENCOUNTER — CARE COORDINATION (OUTPATIENT)
Dept: CASE MANAGEMENT | Age: 69
End: 2019-08-15

## 2019-08-15 NOTE — CARE COORDINATION
[facility] Transitions Follow Up Call    8/15/2019    Patient: Yannick Luevano  Patient : 1950   MRN: [id number]  Reason for Admission: . Discharge Date: 19 RARS: Readmission Risk Score: 18         # 1 attempt- unable to reach patient, left vm message with name and call back number, requested call back//JU    Care Transitions Subsequent and Final Call    Subsequent and Final Calls  Are you currently active with any services?:  Home Health  Care Transitions Interventions  Other Interventions:             Follow Up  Future Appointments   Date Time Provider Britni Marshall   2019 11:45 AM Tammi Espino MD [address]   2019 10:50 AM Melissa Estrada MD [facility]   2019  3:00 PM Gordon Sales MD [facility]   2019  9:40 AM MAIK Anderson - CNP [address] None       Tatyana Kearney RN

## 2019-08-16 ENCOUNTER — CARE COORDINATION (OUTPATIENT)
Dept: CASE MANAGEMENT | Age: 69
End: 2019-08-16

## 2019-08-16 NOTE — CARE COORDINATION
[facility]   8/27/2019 10:50 AM Juan Francisco Wilson MD [facility]   9/9/2019  3:00 PM Diaz Sarmiento MD [facility] [place]   9/11/2019  9:40 AM Pepe Martinez APRN - CNP [facility] None       Gagan Coker, RN

## 2019-08-17 ENCOUNTER — HOSPITAL ENCOUNTER (EMERGENCY)
Age: 69
Discharge: HOME | End: 2019-08-17
Attending: EMERGENCY MEDICINE
Payer: MEDICARE

## 2019-08-17 ENCOUNTER — APPOINTMENT (OUTPATIENT)
Dept: CT IMAGING | Age: 69
End: 2019-08-17
Payer: MEDICARE

## 2019-08-17 VITALS
BODY MASS INDEX: 30.12 KG/M2 | SYSTOLIC BLOOD PRESSURE: 161 MMHG | HEART RATE: 75 BPM | OXYGEN SATURATION: 96 % | DIASTOLIC BLOOD PRESSURE: 65 MMHG | HEIGHT: 63 IN | WEIGHT: 170 LBS | RESPIRATION RATE: 20 BRPM | TEMPERATURE: 98.2 F

## 2019-08-17 DIAGNOSIS — R10.9 ABDOMINAL PAIN, UNSPECIFIED ABDOMINAL LOCATION: Primary | ICD-10-CM

## 2019-08-17 DIAGNOSIS — R11.2 NON-INTRACTABLE VOMITING WITH NAUSEA, UNSPECIFIED VOMITING TYPE: ICD-10-CM

## 2019-08-17 LAB
-: ABNORMAL
-: NORMAL
ABO/RH: NORMAL
ABSOLUTE EOS #: 0 K/UL (ref 0–0.4)
ABSOLUTE IMMATURE GRANULOCYTE: ABNORMAL K/UL (ref 0–0.3)
ABSOLUTE LYMPH #: 1.1 K/UL (ref 1–4.8)
ABSOLUTE MONO #: 0.5 K/UL (ref 0.1–1.3)
ALBUMIN SERPL-MCNC: 4.3 G/DL (ref 3.5–5.2)
ALBUMIN/GLOBULIN RATIO: ABNORMAL (ref 1–2.5)
ALP BLD-CCNC: 33 U/L (ref 35–104)
ALT SERPL-CCNC: 10 U/L (ref 5–33)
AMORPHOUS: ABNORMAL
ANION GAP SERPL CALCULATED.3IONS-SCNC: 15 MMOL/L (ref 9–17)
ANTIBODY SCREEN: NEGATIVE
ARM BAND NUMBER: NORMAL
AST SERPL-CCNC: 12 U/L
BACTERIA: ABNORMAL
BASOPHILS # BLD: 1 % (ref 0–2)
BASOPHILS ABSOLUTE: 0.1 K/UL (ref 0–0.2)
BILIRUB SERPL-MCNC: 0.35 MG/DL (ref 0.3–1.2)
BILIRUBIN URINE: NEGATIVE
BUN BLDV-MCNC: 19 MG/DL (ref 8–23)
BUN/CREAT BLD: ABNORMAL (ref 9–20)
CALCIUM SERPL-MCNC: 9.7 MG/DL (ref 8.6–10.4)
CASTS UA: ABNORMAL /LPF
CHLORIDE BLD-SCNC: 107 MMOL/L (ref 98–107)
CO2: 21 MMOL/L (ref 20–31)
COLOR: YELLOW
COMMENT UA: ABNORMAL
CREAT SERPL-MCNC: 0.99 MG/DL (ref 0.5–0.9)
CRYSTALS, UA: ABNORMAL /HPF
DIFFERENTIAL TYPE: ABNORMAL
EOSINOPHILS RELATIVE PERCENT: 0 % (ref 0–4)
EPITHELIAL CELLS UA: ABNORMAL /HPF
EXPIRATION DATE: NORMAL
GFR AFRICAN AMERICAN: >60 ML/MIN
GFR NON-AFRICAN AMERICAN: 56 ML/MIN
GFR SERPL CREATININE-BSD FRML MDRD: ABNORMAL ML/MIN/{1.73_M2}
GFR SERPL CREATININE-BSD FRML MDRD: ABNORMAL ML/MIN/{1.73_M2}
GLUCOSE BLD-MCNC: 103 MG/DL (ref 65–105)
GLUCOSE BLD-MCNC: 108 MG/DL (ref 70–99)
GLUCOSE URINE: NEGATIVE
HCT VFR BLD CALC: 31.1 % (ref 36–46)
HEMOGLOBIN: 10.5 G/DL (ref 12–16)
IMMATURE GRANULOCYTES: ABNORMAL %
INR BLD: 1
KETONES, URINE: NEGATIVE
LEUKOCYTE ESTERASE, URINE: ABNORMAL
LIPASE: 20 U/L (ref 13–60)
LYMPHOCYTES # BLD: 13 % (ref 24–44)
MCH RBC QN AUTO: 32.2 PG (ref 26–34)
MCHC RBC AUTO-ENTMCNC: 33.6 G/DL (ref 31–37)
MCV RBC AUTO: 95.7 FL (ref 80–100)
MONOCYTES # BLD: 6 % (ref 1–7)
MUCUS: ABNORMAL
NITRITE, URINE: NEGATIVE
NRBC AUTOMATED: ABNORMAL PER 100 WBC
OTHER OBSERVATIONS UA: ABNORMAL
PDW BLD-RTO: 14.1 % (ref 11.5–14.9)
PH UA: 7.5 (ref 5–8)
PLATELET # BLD: 387 K/UL (ref 150–450)
PLATELET ESTIMATE: ABNORMAL
PMV BLD AUTO: 7.6 FL (ref 6–12)
POTASSIUM SERPL-SCNC: 3.8 MMOL/L (ref 3.7–5.3)
PROTEIN UA: NEGATIVE
PROTHROMBIN TIME: 13.5 SEC (ref 11.8–14.6)
RBC # BLD: 3.25 M/UL (ref 4–5.2)
RBC # BLD: ABNORMAL 10*6/UL
RBC UA: ABNORMAL /HPF
REASON FOR REJECTION: NORMAL
RENAL EPITHELIAL, UA: ABNORMAL /HPF
SEG NEUTROPHILS: 80 % (ref 36–66)
SEGMENTED NEUTROPHILS ABSOLUTE COUNT: 6.8 K/UL (ref 1.3–9.1)
SODIUM BLD-SCNC: 143 MMOL/L (ref 135–144)
SPECIFIC GRAVITY UA: 1.02 (ref 1–1.03)
TOTAL PROTEIN: 7 G/DL (ref 6.4–8.3)
TRICHOMONAS: ABNORMAL
TROPONIN INTERP: ABNORMAL
TROPONIN INTERP: ABNORMAL
TROPONIN T: ABNORMAL NG/ML
TROPONIN T: ABNORMAL NG/ML
TROPONIN, HIGH SENSITIVITY: 22 NG/L (ref 0–14)
TROPONIN, HIGH SENSITIVITY: 23 NG/L (ref 0–14)
TURBIDITY: CLEAR
URINE HGB: NEGATIVE
UROBILINOGEN, URINE: NORMAL
WBC # BLD: 8.5 K/UL (ref 3.5–11)
WBC # BLD: ABNORMAL 10*3/UL
WBC UA: ABNORMAL /HPF
YEAST: ABNORMAL
ZZ NTE CLEAN UP: ORDERED TEST: NORMAL
ZZ NTE WITH NAME CLEAN UP: SPECIMEN SOURCE: NORMAL

## 2019-08-17 PROCEDURE — 86900 BLOOD TYPING SEROLOGIC ABO: CPT

## 2019-08-17 PROCEDURE — 87186 SC STD MICRODIL/AGAR DIL: CPT

## 2019-08-17 PROCEDURE — 93005 ELECTROCARDIOGRAM TRACING: CPT | Performed by: EMERGENCY MEDICINE

## 2019-08-17 PROCEDURE — 85610 PROTHROMBIN TIME: CPT

## 2019-08-17 PROCEDURE — 6360000002 HC RX W HCPCS: Performed by: EMERGENCY MEDICINE

## 2019-08-17 PROCEDURE — 81001 URINALYSIS AUTO W/SCOPE: CPT

## 2019-08-17 PROCEDURE — 99284 EMERGENCY DEPT VISIT MOD MDM: CPT

## 2019-08-17 PROCEDURE — 80053 COMPREHEN METABOLIC PANEL: CPT

## 2019-08-17 PROCEDURE — 86850 RBC ANTIBODY SCREEN: CPT

## 2019-08-17 PROCEDURE — 96365 THER/PROPH/DIAG IV INF INIT: CPT

## 2019-08-17 PROCEDURE — 84484 ASSAY OF TROPONIN QUANT: CPT

## 2019-08-17 PROCEDURE — 85025 COMPLETE CBC W/AUTO DIFF WBC: CPT

## 2019-08-17 PROCEDURE — 2580000003 HC RX 258: Performed by: EMERGENCY MEDICINE

## 2019-08-17 PROCEDURE — 82947 ASSAY GLUCOSE BLOOD QUANT: CPT

## 2019-08-17 PROCEDURE — 74176 CT ABD & PELVIS W/O CONTRAST: CPT

## 2019-08-17 PROCEDURE — 83690 ASSAY OF LIPASE: CPT

## 2019-08-17 PROCEDURE — C9113 INJ PANTOPRAZOLE SODIUM, VIA: HCPCS | Performed by: EMERGENCY MEDICINE

## 2019-08-17 PROCEDURE — 87086 URINE CULTURE/COLONY COUNT: CPT

## 2019-08-17 PROCEDURE — 87088 URINE BACTERIA CULTURE: CPT

## 2019-08-17 PROCEDURE — 36415 COLL VENOUS BLD VENIPUNCTURE: CPT

## 2019-08-17 PROCEDURE — 96375 TX/PRO/DX INJ NEW DRUG ADDON: CPT

## 2019-08-17 PROCEDURE — 6370000000 HC RX 637 (ALT 250 FOR IP): Performed by: EMERGENCY MEDICINE

## 2019-08-17 PROCEDURE — 86901 BLOOD TYPING SEROLOGIC RH(D): CPT

## 2019-08-17 RX ORDER — 0.9 % SODIUM CHLORIDE 0.9 %
1000 INTRAVENOUS SOLUTION INTRAVENOUS ONCE
Status: COMPLETED | OUTPATIENT
Start: 2019-08-17 | End: 2019-08-17

## 2019-08-17 RX ORDER — FENTANYL CITRATE 50 UG/ML
50 INJECTION, SOLUTION INTRAMUSCULAR; INTRAVENOUS ONCE
Status: COMPLETED | OUTPATIENT
Start: 2019-08-17 | End: 2019-08-17

## 2019-08-17 RX ORDER — ACETAMINOPHEN 325 MG/1
650 TABLET ORAL ONCE
Status: COMPLETED | OUTPATIENT
Start: 2019-08-17 | End: 2019-08-17

## 2019-08-17 RX ADMIN — PANTOPRAZOLE SODIUM 80 MG: 40 INJECTION, POWDER, FOR SOLUTION INTRAVENOUS at 18:30

## 2019-08-17 RX ADMIN — FENTANYL CITRATE 50 MCG: 50 INJECTION INTRAMUSCULAR; INTRAVENOUS at 18:28

## 2019-08-17 RX ADMIN — ACETAMINOPHEN 650 MG: 325 TABLET, FILM COATED ORAL at 22:04

## 2019-08-17 RX ADMIN — SODIUM CHLORIDE 1000 ML: 9 INJECTION, SOLUTION INTRAVENOUS at 18:28

## 2019-08-17 ASSESSMENT — PAIN SCALES - GENERAL
PAINLEVEL_OUTOF10: 1
PAINLEVEL_OUTOF10: 3
PAINLEVEL_OUTOF10: 5

## 2019-08-17 ASSESSMENT — PAIN DESCRIPTION - PAIN TYPE: TYPE: ACUTE PAIN

## 2019-08-17 ASSESSMENT — ENCOUNTER SYMPTOMS
BACK PAIN: 0
ABDOMINAL PAIN: 1
SORE THROAT: 0
SHORTNESS OF BREATH: 0
CHEST TIGHTNESS: 0
EYE REDNESS: 0
DIARRHEA: 0
CONSTIPATION: 0
EYE PAIN: 0
VOMITING: 1
NAUSEA: 1
COUGH: 0

## 2019-08-17 ASSESSMENT — PAIN DESCRIPTION - LOCATION: LOCATION: ABDOMEN

## 2019-08-17 NOTE — ED NOTES
Report given to Lauro Dueñas from ED. Report method in person   The following was reviewed with receiving RN:   Current vital signs:  BP (!) 161/65   Pulse 75   Temp 98.2 °F (36.8 °C) (Oral)   Resp 20   Ht 5' 3\" (1.6 m)   Wt 170 lb (77.1 kg)   SpO2 96%   BMI 30.11 kg/m²                MEWS Score: 1     Any medication or safety alerts were reviewed. Any pending diagnostics and notifications were also reviewed, as well as any safety concerns or issues, abnormal labs, abnormal imaging, and abnormal assessment findings. Questions were answered.             Sallie Mckeon RN  08/17/19 [id number]

## 2019-08-17 NOTE — ED PROVIDER NOTES
August    leg left leg/bug bite    Diverticulosis of colon (without mention of hemorrhage)     GERD (gastroesophageal reflux disease)     History of MI (myocardial infarction) 2005    thought due to a blood clot    History of ovarian cyst 1970    had oopherectomy    History of peritonitis 1968    due to ruptured appendix age 13    HTN (hypertension)     Hx of blood clots     right leg    Hyperlipidemia     Intestinal or peritoneal adhesions with obstruction (postoperative) (postinfection) ([address]     Kidney infection     renal failure/sepsis/spider bite    Lateral epicondylitis  of elbow     Muscle strain     right posterior shoulder    Other abnormal glucose     Restless legs syndrome (RLS)     Vitamin D deficiency     Wears glasses      SURGICAL HISTORY       Past Surgical History:   Procedure Laterality Date    ABDOMEN SURGERY  1976    benign tumor removed near remaining overy, 1.5 pounds    APPENDECTOMY  1968    appendix ruptured, developed peritonitis    BUNIONECTOMY Left     along with calcium deposits removed   [address]  2005    negative    COLECTOMY  1969    12 INCHES REMOVED D/T OBSTRUCTION    COLONOSCOPY      CYST REMOVAL Right     right facial    HYSTERECTOMY  1973    taken as a result of recurring cysts    OTHER SURGICAL HISTORY  8/14/14    FESS    OVARY REMOVAL  1970    UNILATERAL due to cyst    OVARY REMOVAL  1971    partial, due to cyst    SINUS SURGERY  2004    UPPER GASTROINTESTINAL ENDOSCOPY N/A 5/31/2019    EGD ESOPHAGOGASTRODUODENOSCOPY performed by Myesha Sands MD at [address] N/A 8/5/2019    EGD BIOPSY performed by Laura Holden MD at [address] Left 3/5/2019    WRIST OPEN REDUCTION INTERNAL FIXATION performed by Severa Meager, MD at [address] [place] [address]       Previous Medications    ACETAMINOPHEN (TYLENOL) 500 MG TABLET    Take 500 mg by

## 2019-08-19 ENCOUNTER — OFFICE VISIT (OUTPATIENT)
Dept: INTERNAL MEDICINE CLINIC | Age: 69
End: 2019-08-19
Payer: MEDICARE

## 2019-08-19 VITALS
DIASTOLIC BLOOD PRESSURE: 70 MMHG | RESPIRATION RATE: 16 BRPM | SYSTOLIC BLOOD PRESSURE: 130 MMHG | HEIGHT: 63 IN | WEIGHT: 158.8 LBS | HEART RATE: 88 BPM | BODY MASS INDEX: 28.14 KG/M2

## 2019-08-19 DIAGNOSIS — I10 ESSENTIAL HYPERTENSION: Primary | ICD-10-CM

## 2019-08-19 DIAGNOSIS — M51.36 LUMBAR DEGENERATIVE DISC DISEASE: ICD-10-CM

## 2019-08-19 DIAGNOSIS — G57.11 MERALGIA PARESTHETICA OF RIGHT SIDE: ICD-10-CM

## 2019-08-19 DIAGNOSIS — E11.59 TYPE 2 DIABETES MELLITUS WITH OTHER CIRCULATORY COMPLICATION, WITHOUT LONG-TERM CURRENT USE OF INSULIN (HCC): ICD-10-CM

## 2019-08-19 DIAGNOSIS — N18.30 STAGE 3 CHRONIC KIDNEY DISEASE (HCC): ICD-10-CM

## 2019-08-19 LAB
CULTURE: ABNORMAL
EKG ATRIAL RATE: 79 BPM
EKG P AXIS: 60 DEGREES
EKG P-R INTERVAL: 158 MS
EKG Q-T INTERVAL: 378 MS
EKG QRS DURATION: 82 MS
EKG QTC CALCULATION (BAZETT): 433 MS
EKG R AXIS: 30 DEGREES
EKG T AXIS: 46 DEGREES
EKG VENTRICULAR RATE: 79 BPM
Lab: ABNORMAL
SPECIMEN DESCRIPTION: ABNORMAL

## 2019-08-19 PROCEDURE — G8417 CALC BMI ABV UP PARAM F/U: HCPCS | Performed by: INTERNAL MEDICINE

## 2019-08-19 PROCEDURE — G8598 ASA/ANTIPLAT THER USED: HCPCS | Performed by: INTERNAL MEDICINE

## 2019-08-19 PROCEDURE — 1123F ACP DISCUSS/DSCN MKR DOCD: CPT | Performed by: INTERNAL MEDICINE

## 2019-08-19 PROCEDURE — 4040F PNEUMOC VAC/ADMIN/RCVD: CPT | Performed by: INTERNAL MEDICINE

## 2019-08-19 PROCEDURE — 3044F HG A1C LEVEL LT 7.0%: CPT | Performed by: INTERNAL MEDICINE

## 2019-08-19 PROCEDURE — 1111F DSCHRG MED/CURRENT MED MERGE: CPT | Performed by: INTERNAL MEDICINE

## 2019-08-19 PROCEDURE — 1090F PRES/ABSN URINE INCON ASSESS: CPT | Performed by: INTERNAL MEDICINE

## 2019-08-19 PROCEDURE — G8427 DOCREV CUR MEDS BY ELIG CLIN: HCPCS | Performed by: INTERNAL MEDICINE

## 2019-08-19 PROCEDURE — 2022F DILAT RTA XM EVC RTNOPTHY: CPT | Performed by: INTERNAL MEDICINE

## 2019-08-19 PROCEDURE — 1036F TOBACCO NON-USER: CPT | Performed by: INTERNAL MEDICINE

## 2019-08-19 PROCEDURE — 93010 ELECTROCARDIOGRAM REPORT: CPT | Performed by: INTERNAL MEDICINE

## 2019-08-19 PROCEDURE — 3017F COLORECTAL CA SCREEN DOC REV: CPT | Performed by: INTERNAL MEDICINE

## 2019-08-19 PROCEDURE — 99214 OFFICE O/P EST MOD 30 MIN: CPT | Performed by: INTERNAL MEDICINE

## 2019-08-19 PROCEDURE — G8399 PT W/DXA RESULTS DOCUMENT: HCPCS | Performed by: INTERNAL MEDICINE

## 2019-08-19 RX ORDER — ONDANSETRON 4 MG/1
4 TABLET, FILM COATED ORAL DAILY PRN
Qty: 30 TABLET | Refills: 0 | Status: ON HOLD | OUTPATIENT
Start: 2019-08-19 | End: 2020-02-24 | Stop reason: HOSPADM

## 2019-08-19 RX ORDER — PANTOPRAZOLE SODIUM 20 MG/1
20 TABLET, DELAYED RELEASE ORAL DAILY
Qty: 30 TABLET | Refills: 11 | Status: SHIPPED | OUTPATIENT
Start: 2019-08-19 | End: 2019-08-21

## 2019-08-19 ASSESSMENT — ENCOUNTER SYMPTOMS
CHEST TIGHTNESS: 0
BACK PAIN: 1
CONSTIPATION: 1
SINUS PRESSURE: 0
ANAL BLEEDING: 0
FACIAL SWELLING: 0
ABDOMINAL DISTENTION: 1
PHOTOPHOBIA: 0
COLOR CHANGE: 0
CHOKING: 0
RHINORRHEA: 0
BLOOD IN STOOL: 0
EYE ITCHING: 0
RECTAL PAIN: 0
VOICE CHANGE: 0
EYE REDNESS: 0
STRIDOR: 0
EYE PAIN: 0
SORE THROAT: 0
EYE DISCHARGE: 0
VOMITING: 0
NAUSEA: 0
COUGH: 0
DIARRHEA: 0
ABDOMINAL PAIN: 1
APNEA: 0
SHORTNESS OF BREATH: 0
WHEEZING: 0
TROUBLE SWALLOWING: 0

## 2019-08-19 NOTE — PROGRESS NOTES
D/T OBSTRUCTION    COLONOSCOPY      CYST REMOVAL Right     right facial    HYSTERECTOMY  1973    taken as a result of recurring cysts    OTHER SURGICAL HISTORY  14    FESS    OVARY REMOVAL  1970    UNILATERAL due to cyst    OVARY REMOVAL      partial, due to cyst   Joo Mirza SINUS SURGERY      UPPER GASTROINTESTINAL ENDOSCOPY N/A 2019    EGD ESOPHAGOGASTRODUODENOSCOPY performed by Lisa Durbin MD at [address] N/A 2019    EGD BIOPSY performed by Jaqueline Wharton MD at [address] Left 3/5/2019    WRIST OPEN REDUCTION INTERNAL FIXATION performed by Mary Hennessy MD at [address] History   Problem Relation Age of Onset    Stroke Mother     Diabetes Mother     Heart Disease Mother     High Blood Pressure Mother     Heart Disease Father     Heart Disease Brother     High Blood Pressure Brother     Heart Disease Maternal Grandmother     High Blood Pressure Sister        Social History     Socioeconomic History    Marital status:      Spouse name: Not on file    Number of children: Not on file    Years of education: Not on file    Highest education level: Not on file   Occupational History    Occupation: retired   Social Needs    Financial resource strain: Not on file    Food insecurity:     Worry: Not on file     Inability: Not on file   FeZo needs:     Medical: Not on file     Non-medical: Not on file   Tobacco Use    Smoking status: Former Smoker     Packs/day: 0.50     Years: 20.00     Pack years: 10.00     Types: Cigarettes     Start date: 1995     Last attempt to quit: 2017     Years since quittin.1    Smokeless tobacco: Never Used    Tobacco comment: 18 quit 10 days ago   Substance and Sexual Activity    Alcohol use: No     Alcohol/week: 0.0 standard drinks     Comment: occasionally    Drug use: No    Sexual activity: Not on file   Lifestyle    Physical Lancets MISC 1 each by Does not apply route daily 100 each 3    citalopram (CELEXA) 10 MG tablet Take 1 tablet by mouth daily 90 tablet 3    glucose monitoring kit (FREESTYLE) monitoring kit 1 kit by Does not apply route daily 1 kit 0    SITagliptin (JANUVIA) 100 MG tablet Take 1 tablet by mouth daily 90 tablet 3    acetaminophen (TYLENOL) 500 MG tablet Take 500 mg by mouth every 6 hours as needed for Pain      gabapentin (NEURONTIN) 300 MG capsule Take 1 capsule by mouth nightly for 180 days. 90 capsule 1    lidocaine (XYLOCAINE) 5 % ointment 4-5 times a day to your right thigh for pain. 240 g 3    furosemide (LASIX) 40 MG tablet Take 1 tablet by mouth daily 90 tablet 2    atorvastatin (LIPITOR) 80 MG tablet Take 1 tablet by mouth nightly 90 tablet 3    nitroGLYCERIN (NITROSTAT) 0.4 MG SL tablet Place 1 tablet under the tongue every 5 minutes as needed for Chest pain 75 tablet 3    vitamin D (CHOLECALCIFEROL) 1000 UNIT TABS tablet Take 1 tablet by mouth daily 90 tablet 3    Calcium Carbonate-Vitamin D (CALCIUM 500/D) 500-125 MG-UNIT TABS Take 1 tablet by mouth 2 times daily        No current facility-administered medications for this visit. Review of Systems:    Review of Systems   Constitutional: Negative for activity change, appetite change, chills, diaphoresis, fatigue and fever. HENT: Negative for congestion, dental problem, drooling, ear discharge, ear pain, facial swelling, hearing loss, mouth sores, nosebleeds, postnasal drip, rhinorrhea, sinus pressure, sneezing, sore throat, tinnitus, trouble swallowing and voice change. Eyes: Negative for photophobia, pain, discharge, redness, itching and visual disturbance. Respiratory: Negative for apnea, cough, choking, chest tightness, shortness of breath, wheezing and stridor. Cardiovascular: Negative for chest pain, palpitations and leg swelling. Gastrointestinal: Positive for abdominal distention, abdominal pain and constipation. Negative for anal bleeding, blood in stool, diarrhea, nausea, rectal pain and vomiting. Endocrine: Negative for cold intolerance, heat intolerance, polydipsia, polyphagia and polyuria. Genitourinary: Negative for decreased urine volume, difficulty urinating, dyspareunia, dysuria, enuresis, flank pain, frequency, genital sores, hematuria, menstrual problem, pelvic pain, urgency, vaginal bleeding and vaginal discharge. Musculoskeletal: Positive for back pain and myalgias. Negative for arthralgias, gait problem, joint swelling, neck pain and neck stiffness. Skin: Negative for color change, pallor, rash and wound. Neurological: Negative for dizziness, tremors, seizures, syncope, facial asymmetry, speech difficulty, weakness, light-headedness, numbness and headaches. Hematological: Negative for adenopathy. Does not bruise/bleed easily. Psychiatric/Behavioral: Positive for dysphoric mood and sleep disturbance. Negative for agitation, behavioral problems, confusion and decreased concentration. The patient is nervous/anxious. Objective:     PhysicalExam:      Physical Exam   Constitutional: She is oriented to person, place, and time. She appears well-developed and well-nourished. No distress. HENT:   Head: Normocephalic and atraumatic. Right Ear: External ear normal.   Left Ear: External ear normal.   Nose: Nose normal.   Mouth/Throat: Oropharynx is clear and moist. No oropharyngeal exudate. Eyes: Pupils are equal, round, and reactive to light. Conjunctivae and EOM are normal. Right eye exhibits no discharge. Left eye exhibits no discharge. No scleral icterus. Neck: Normal range of motion. Neck supple. No JVD present. No tracheal deviation present. No thyromegaly present. Cardiovascular: Normal rate, regular rhythm, normal heart sounds and intact distal pulses. Exam reveals no gallop and no friction rub. No murmur heard.   Pulmonary/Chest: Effort normal and breath sounds normal. No

## 2019-08-20 ENCOUNTER — TELEPHONE (OUTPATIENT)
Dept: INTERNAL MEDICINE CLINIC | Age: 69
End: 2019-08-20

## 2019-08-20 ENCOUNTER — CARE COORDINATION (OUTPATIENT)
Dept: CASE MANAGEMENT | Age: 69
End: 2019-08-20

## 2019-08-20 DIAGNOSIS — M54.42 CHRONIC BILATERAL LOW BACK PAIN WITH LEFT-SIDED SCIATICA: ICD-10-CM

## 2019-08-20 DIAGNOSIS — I63.429 CEREBROVASCULAR ACCIDENT (CVA) DUE TO EMBOLISM OF ANTERIOR CEREBRAL ARTERY, UNSPECIFIED BLOOD VESSEL LATERALITY (HCC): Primary | ICD-10-CM

## 2019-08-20 DIAGNOSIS — G89.29 CHRONIC BILATERAL LOW BACK PAIN WITH LEFT-SIDED SCIATICA: ICD-10-CM

## 2019-08-20 DIAGNOSIS — I10 ESSENTIAL HYPERTENSION: ICD-10-CM

## 2019-08-20 NOTE — CARE COORDINATION
[facility] Transitions Follow Up Call    2019    Patient: Carmelita Melton  Patient : 1950   MRN: [id number]  Reason for Admission: GI bleed  Discharge Date: 19 RARS: Readmission Risk Score: 25         Spoke with:Criselda  Writer spoke to patient, she stated she wasn't doing very good, was having a lot of nausea, not able to really eat to much, feeling very week having pain in her legs, stated she had just been to the ER on Saturday, wasn't admitted but had come in with abdominal pain, she had an appointment with her pcp yesterday and he had started her on zofran prn. Patient didn't want to make another appointment to see doctor since she had just been there yesterday and has an appointment with Dr. Ayla Yoder next week, patient was agreeable to vns, which she had refused on discharge, writer contacted sean from [facility] , she said she would contact pcp's office and get some vns started today, hopefully preventing a return to the ER or a hospital readmission,  call was suppose to be final call, Alma Connell will extend care transitions at this time//JU    Care Transitions Subsequent and Final Call    Subsequent and Final Calls  Do you have any ongoing symptoms?:  Yes  Onset of Patient-reported symptoms: In the past 7 days  Patient-reported symptoms:  Nausea, Other, Weakness, Pain  Interventions for patient-reported symptoms:  Notified Home Care  Have your medications changed?:  Yes  Do you have any questions related to your medications?:  No  Are you currently active with any services?:  Home Health  Do you have any needs or concerns that I can assist you with?:  Yes  Care Transitions Interventions     Other Services:   (Comment: set up vns/[facility])   Other Interventions:             Follow Up  Future Appointments   Date Time Provider Britni Marshall   2019 10:50 AM MD JOSE ENRIQUE Vickers [facility]   2019  3:00 PM MD SCOTTIE Dixon [facility]   2019  9:40 AM MAIK Mejia - [facility] BELLA

## 2019-08-21 ENCOUNTER — HOSPITAL ENCOUNTER (INPATIENT)
Age: 69
LOS: 5 days | Discharge: HOME HEALTH | DRG: 372 | End: 2019-08-26
Attending: EMERGENCY MEDICINE | Admitting: INTERNAL MEDICINE
Payer: MEDICARE

## 2019-08-21 DIAGNOSIS — N17.9 ACUTE RENAL FAILURE, UNSPECIFIED ACUTE RENAL FAILURE TYPE (HCC): Primary | ICD-10-CM

## 2019-08-21 LAB
-: ABNORMAL
ABSOLUTE EOS #: 0.1 K/UL (ref 0–0.4)
ABSOLUTE IMMATURE GRANULOCYTE: ABNORMAL K/UL (ref 0–0.3)
ABSOLUTE LYMPH #: 1 K/UL (ref 1–4.8)
ABSOLUTE MONO #: 0.9 K/UL (ref 0.1–1.3)
ALBUMIN SERPL-MCNC: 4.3 G/DL (ref 3.5–5.2)
ALBUMIN/GLOBULIN RATIO: ABNORMAL (ref 1–2.5)
ALP BLD-CCNC: 48 U/L (ref 35–104)
ALT SERPL-CCNC: 14 U/L (ref 5–33)
AMORPHOUS: ABNORMAL
ANION GAP SERPL CALCULATED.3IONS-SCNC: 18 MMOL/L (ref 9–17)
AST SERPL-CCNC: 19 U/L
BACTERIA: ABNORMAL
BASOPHILS # BLD: 1 % (ref 0–2)
BASOPHILS ABSOLUTE: 0.1 K/UL (ref 0–0.2)
BILIRUB SERPL-MCNC: 0.45 MG/DL (ref 0.3–1.2)
BILIRUBIN URINE: ABNORMAL
BUN BLDV-MCNC: 39 MG/DL (ref 8–23)
BUN/CREAT BLD: ABNORMAL (ref 9–20)
CALCIUM SERPL-MCNC: 9.5 MG/DL (ref 8.6–10.4)
CASTS UA: ABNORMAL /LPF
CHLORIDE BLD-SCNC: 101 MMOL/L (ref 98–107)
CO2: 18 MMOL/L (ref 20–31)
COLOR: ABNORMAL
COMMENT UA: ABNORMAL
CREAT SERPL-MCNC: 1.8 MG/DL (ref 0.5–0.9)
CRYSTALS, UA: ABNORMAL /HPF
CRYSTALS, UA: ABNORMAL /HPF
DIFFERENTIAL TYPE: ABNORMAL
EOSINOPHILS RELATIVE PERCENT: 1 % (ref 0–4)
EPITHELIAL CELLS UA: ABNORMAL /HPF
GFR AFRICAN AMERICAN: 34 ML/MIN
GFR NON-AFRICAN AMERICAN: 28 ML/MIN
GFR SERPL CREATININE-BSD FRML MDRD: ABNORMAL ML/MIN/{1.73_M2}
GFR SERPL CREATININE-BSD FRML MDRD: ABNORMAL ML/MIN/{1.73_M2}
GLUCOSE BLD-MCNC: 102 MG/DL (ref 70–99)
GLUCOSE URINE: NEGATIVE
HCT VFR BLD CALC: 34.7 % (ref 36–46)
HEMOGLOBIN: 11.5 G/DL (ref 12–16)
IMMATURE GRANULOCYTES: ABNORMAL %
INR BLD: 1.1
KETONES, URINE: ABNORMAL
LEUKOCYTE ESTERASE, URINE: NEGATIVE
LIPASE: 50 U/L (ref 13–60)
LYMPHOCYTES # BLD: 10 % (ref 24–44)
MCH RBC QN AUTO: 31.4 PG (ref 26–34)
MCHC RBC AUTO-ENTMCNC: 33 G/DL (ref 31–37)
MCV RBC AUTO: 95.4 FL (ref 80–100)
MONOCYTES # BLD: 9 % (ref 1–7)
MUCUS: ABNORMAL
NITRITE, URINE: NEGATIVE
NRBC AUTOMATED: ABNORMAL PER 100 WBC
OTHER OBSERVATIONS UA: ABNORMAL
PDW BLD-RTO: 13.8 % (ref 11.5–14.9)
PH UA: 5 (ref 5–8)
PLATELET # BLD: 377 K/UL (ref 150–450)
PLATELET ESTIMATE: ABNORMAL
PMV BLD AUTO: 7.6 FL (ref 6–12)
POTASSIUM SERPL-SCNC: 3.4 MMOL/L (ref 3.7–5.3)
PROTEIN UA: ABNORMAL
PROTHROMBIN TIME: 13.7 SEC (ref 11.8–14.6)
RBC # BLD: 3.64 M/UL (ref 4–5.2)
RBC # BLD: ABNORMAL 10*6/UL
RBC UA: ABNORMAL /HPF
RENAL EPITHELIAL, UA: ABNORMAL /HPF
SEG NEUTROPHILS: 79 % (ref 36–66)
SEGMENTED NEUTROPHILS ABSOLUTE COUNT: 7.9 K/UL (ref 1.3–9.1)
SODIUM BLD-SCNC: 137 MMOL/L (ref 135–144)
SPECIFIC GRAVITY UA: 1.03 (ref 1–1.03)
TOTAL PROTEIN: 7.3 G/DL (ref 6.4–8.3)
TRICHOMONAS: ABNORMAL
TROPONIN INTERP: ABNORMAL
TROPONIN T: ABNORMAL NG/ML
TROPONIN, HIGH SENSITIVITY: 37 NG/L (ref 0–14)
TURBIDITY: ABNORMAL
URINE HGB: NEGATIVE
UROBILINOGEN, URINE: NORMAL
WBC # BLD: 9.9 K/UL (ref 3.5–11)
WBC # BLD: ABNORMAL 10*3/UL
WBC UA: ABNORMAL /HPF
YEAST: ABNORMAL

## 2019-08-21 PROCEDURE — 93005 ELECTROCARDIOGRAM TRACING: CPT | Performed by: STUDENT IN AN ORGANIZED HEALTH CARE EDUCATION/TRAINING PROGRAM

## 2019-08-21 PROCEDURE — 84156 ASSAY OF PROTEIN URINE: CPT

## 2019-08-21 PROCEDURE — 81001 URINALYSIS AUTO W/SCOPE: CPT

## 2019-08-21 PROCEDURE — 80053 COMPREHEN METABOLIC PANEL: CPT

## 2019-08-21 PROCEDURE — 83690 ASSAY OF LIPASE: CPT

## 2019-08-21 PROCEDURE — 84484 ASSAY OF TROPONIN QUANT: CPT

## 2019-08-21 PROCEDURE — 51798 US URINE CAPACITY MEASURE: CPT

## 2019-08-21 PROCEDURE — 99285 EMERGENCY DEPT VISIT HI MDM: CPT

## 2019-08-21 PROCEDURE — 85610 PROTHROMBIN TIME: CPT

## 2019-08-21 PROCEDURE — 6370000000 HC RX 637 (ALT 250 FOR IP): Performed by: STUDENT IN AN ORGANIZED HEALTH CARE EDUCATION/TRAINING PROGRAM

## 2019-08-21 PROCEDURE — 6370000000 HC RX 637 (ALT 250 FOR IP): Performed by: NURSE PRACTITIONER

## 2019-08-21 PROCEDURE — 2580000003 HC RX 258: Performed by: STUDENT IN AN ORGANIZED HEALTH CARE EDUCATION/TRAINING PROGRAM

## 2019-08-21 PROCEDURE — 36415 COLL VENOUS BLD VENIPUNCTURE: CPT

## 2019-08-21 PROCEDURE — 1200000000 HC SEMI PRIVATE

## 2019-08-21 PROCEDURE — 6360000002 HC RX W HCPCS: Performed by: NURSE PRACTITIONER

## 2019-08-21 PROCEDURE — 84300 ASSAY OF URINE SODIUM: CPT

## 2019-08-21 PROCEDURE — 85025 COMPLETE CBC W/AUTO DIFF WBC: CPT

## 2019-08-21 RX ORDER — ONDANSETRON 2 MG/ML
4 INJECTION INTRAMUSCULAR; INTRAVENOUS EVERY 6 HOURS PRN
Status: DISCONTINUED | OUTPATIENT
Start: 2019-08-21 | End: 2019-08-26 | Stop reason: HOSPADM

## 2019-08-21 RX ORDER — ACETAMINOPHEN 325 MG/1
650 TABLET ORAL EVERY 4 HOURS PRN
Status: DISCONTINUED | OUTPATIENT
Start: 2019-08-21 | End: 2019-08-26 | Stop reason: HOSPADM

## 2019-08-21 RX ORDER — HYDRALAZINE HYDROCHLORIDE 50 MG/1
50 TABLET, FILM COATED ORAL 3 TIMES DAILY
Status: DISCONTINUED | OUTPATIENT
Start: 2019-08-21 | End: 2019-08-22

## 2019-08-21 RX ORDER — PRAMIPEXOLE DIHYDROCHLORIDE 1.5 MG/1
1.5 TABLET ORAL DAILY
Status: DISCONTINUED | OUTPATIENT
Start: 2019-08-22 | End: 2019-08-26 | Stop reason: HOSPADM

## 2019-08-21 RX ORDER — CEPHALEXIN 250 MG/1
500 CAPSULE ORAL ONCE
Status: COMPLETED | OUTPATIENT
Start: 2019-08-21 | End: 2019-08-21

## 2019-08-21 RX ORDER — SODIUM CHLORIDE 0.9 % (FLUSH) 0.9 %
10 SYRINGE (ML) INJECTION PRN
Status: DISCONTINUED | OUTPATIENT
Start: 2019-08-21 | End: 2019-08-26 | Stop reason: HOSPADM

## 2019-08-21 RX ORDER — SODIUM CHLORIDE 0.9 % (FLUSH) 0.9 %
10 SYRINGE (ML) INJECTION EVERY 12 HOURS SCHEDULED
Status: DISCONTINUED | OUTPATIENT
Start: 2019-08-21 | End: 2019-08-26 | Stop reason: HOSPADM

## 2019-08-21 RX ORDER — LIDOCAINE 50 MG/G
OINTMENT TOPICAL PRN
Status: DISCONTINUED | OUTPATIENT
Start: 2019-08-21 | End: 2019-08-26 | Stop reason: HOSPADM

## 2019-08-21 RX ORDER — CALCIUM CARBONATE/VITAMIN D3 250-3.125
2 TABLET ORAL 2 TIMES DAILY
Status: DISCONTINUED | OUTPATIENT
Start: 2019-08-21 | End: 2019-08-26 | Stop reason: HOSPADM

## 2019-08-21 RX ORDER — AMLODIPINE BESYLATE 5 MG/1
5 TABLET ORAL DAILY
Status: DISCONTINUED | OUTPATIENT
Start: 2019-08-22 | End: 2019-08-22

## 2019-08-21 RX ORDER — DOCUSATE SODIUM 100 MG/1
100 CAPSULE, LIQUID FILLED ORAL 2 TIMES DAILY
Status: DISCONTINUED | OUTPATIENT
Start: 2019-08-21 | End: 2019-08-22

## 2019-08-21 RX ORDER — NITROGLYCERIN 0.4 MG/1
0.4 TABLET SUBLINGUAL EVERY 5 MIN PRN
Status: DISCONTINUED | OUTPATIENT
Start: 2019-08-21 | End: 2019-08-26 | Stop reason: HOSPADM

## 2019-08-21 RX ORDER — SODIUM CHLORIDE 9 MG/ML
INJECTION, SOLUTION INTRAVENOUS CONTINUOUS
Status: DISCONTINUED | OUTPATIENT
Start: 2019-08-21 | End: 2019-08-26 | Stop reason: HOSPADM

## 2019-08-21 RX ORDER — HEPARIN SODIUM 5000 [USP'U]/ML
5000 INJECTION, SOLUTION INTRAVENOUS; SUBCUTANEOUS EVERY 8 HOURS SCHEDULED
Status: DISCONTINUED | OUTPATIENT
Start: 2019-08-21 | End: 2019-08-22

## 2019-08-21 RX ORDER — ATORVASTATIN CALCIUM 80 MG/1
80 TABLET, FILM COATED ORAL NIGHTLY
Status: DISCONTINUED | OUTPATIENT
Start: 2019-08-21 | End: 2019-08-26 | Stop reason: HOSPADM

## 2019-08-21 RX ORDER — OXYCODONE HYDROCHLORIDE AND ACETAMINOPHEN 5; 325 MG/1; MG/1
1 TABLET ORAL ONCE
Status: COMPLETED | OUTPATIENT
Start: 2019-08-21 | End: 2019-08-21

## 2019-08-21 RX ORDER — LIDOCAINE HYDROCHLORIDE 40 MG/ML
4 SOLUTION TOPICAL ONCE
Status: DISCONTINUED | OUTPATIENT
Start: 2019-08-21 | End: 2019-08-21 | Stop reason: DRUGHIGH

## 2019-08-21 RX ORDER — CARVEDILOL 12.5 MG/1
12.5 TABLET ORAL 2 TIMES DAILY
Status: DISCONTINUED | OUTPATIENT
Start: 2019-08-21 | End: 2019-08-26 | Stop reason: HOSPADM

## 2019-08-21 RX ORDER — CITALOPRAM 20 MG/1
10 TABLET ORAL DAILY
Status: DISCONTINUED | OUTPATIENT
Start: 2019-08-22 | End: 2019-08-26 | Stop reason: HOSPADM

## 2019-08-21 RX ORDER — LIDOCAINE HYDROCHLORIDE 20 MG/ML
15 SOLUTION OROPHARYNGEAL ONCE
Status: COMPLETED | OUTPATIENT
Start: 2019-08-21 | End: 2019-08-21

## 2019-08-21 RX ORDER — MAGNESIUM HYDROXIDE/ALUMINUM HYDROXICE/SIMETHICONE 120; 1200; 1200 MG/30ML; MG/30ML; MG/30ML
30 SUSPENSION ORAL ONCE
Status: COMPLETED | OUTPATIENT
Start: 2019-08-21 | End: 2019-08-21

## 2019-08-21 RX ORDER — ONDANSETRON 4 MG/1
4 TABLET, FILM COATED ORAL DAILY PRN
Status: DISCONTINUED | OUTPATIENT
Start: 2019-08-21 | End: 2019-08-26 | Stop reason: HOSPADM

## 2019-08-21 RX ORDER — 0.9 % SODIUM CHLORIDE 0.9 %
1000 INTRAVENOUS SOLUTION INTRAVENOUS ONCE
Status: COMPLETED | OUTPATIENT
Start: 2019-08-21 | End: 2019-08-22

## 2019-08-21 RX ADMIN — HEPARIN SODIUM 5000 UNITS: 5000 INJECTION INTRAVENOUS; SUBCUTANEOUS at 22:17

## 2019-08-21 RX ADMIN — CALCIUM CARBONATE-CHOLECALCIFEROL TAB 250 MG-125 UNIT 500 MG: 250-125 TAB at 22:25

## 2019-08-21 RX ADMIN — ATORVASTATIN CALCIUM 80 MG: 80 TABLET, FILM COATED ORAL at 22:17

## 2019-08-21 RX ADMIN — LIDOCAINE HYDROCHLORIDE 15 ML: 20 SOLUTION ORAL; TOPICAL at 19:11

## 2019-08-21 RX ADMIN — CEPHALEXIN 500 MG: 250 CAPSULE ORAL at 18:59

## 2019-08-21 RX ADMIN — SODIUM CHLORIDE 1000 ML: 9 INJECTION, SOLUTION INTRAVENOUS at 21:17

## 2019-08-21 RX ADMIN — OXYCODONE HYDROCHLORIDE AND ACETAMINOPHEN 1 TABLET: 5; 325 TABLET ORAL at 21:16

## 2019-08-21 RX ADMIN — ALUMINUM HYDROXIDE, MAGNESIUM HYDROXIDE, AND SIMETHICONE 30 ML: 200; 200; 20 SUSPENSION ORAL at 19:11

## 2019-08-21 ASSESSMENT — PAIN SCALES - GENERAL
PAINLEVEL_OUTOF10: 5
PAINLEVEL_OUTOF10: 2
PAINLEVEL_OUTOF10: 6

## 2019-08-21 ASSESSMENT — PAIN DESCRIPTION - ORIENTATION: ORIENTATION: MID

## 2019-08-21 ASSESSMENT — PAIN DESCRIPTION - LOCATION: LOCATION: ABDOMEN

## 2019-08-21 ASSESSMENT — ENCOUNTER SYMPTOMS
ABDOMINAL PAIN: 1
SHORTNESS OF BREATH: 0
CHEST TIGHTNESS: 0
BLOOD IN STOOL: 1
NAUSEA: 0

## 2019-08-21 ASSESSMENT — PAIN DESCRIPTION - DESCRIPTORS: DESCRIPTORS: ACHING;THROBBING

## 2019-08-21 ASSESSMENT — PAIN DESCRIPTION - PAIN TYPE: TYPE: ACUTE PAIN

## 2019-08-21 ASSESSMENT — PAIN DESCRIPTION - FREQUENCY: FREQUENCY: INTERMITTENT

## 2019-08-21 NOTE — ED PROVIDER NOTES
program.  Efforts were made to edit the dictations but occasionally words are mis-transcribed.)    Angela Carreon DO  Attending Emergency Physician         Angela Carreon DO  08/21/19 1923

## 2019-08-21 NOTE — ED PROVIDER NOTES
[address] ED  Emergency Department Encounter  EmergencyMedicine Resident     Pt Name:Criselda Parsons  MRN: [id number]  Birthdate 1950  Date of evaluation: 8/21/19  PCP:  Shea Ayala MD    [address] [place] [address]       Chief Complaint   Patient presents with    Abdominal Pain    Leg Pain    Melena       HISTORY OF PRESENT ILLNESS  (Location/Symptom, Timing/Onset, Context/Setting, Quality, Duration, Modifying Factors, Severity.)      Eric Ramirez is a 77 y.o. female who presents with unclear complaint. Patient has had multiple presentations for GI bleed secondary to gastric ulcer compliant with Protonix. Patient stated to dark stools today. Ongoing mild abdominal pain. Was seen 3 or 4 days ago. Patient also complains of left leg pain. She had a ultrasound August 3 to rule out DVT. History of DVT right leg. Clinically stable and had a CT that was normal several days ago. Did have cholelithiasis. PAST MEDICAL / SURGICAL / SOCIAL / FAMILY HISTORY      has a past medical history of Allergic rhinitis, cause unspecified, Back pain, Bowel obstruction (HCC), CAD (coronary artery disease), Cellulitis, Cellulitis, Diverticulosis of colon (without mention of hemorrhage), GERD (gastroesophageal reflux disease), History of MI (myocardial infarction), History of ovarian cyst, History of peritonitis, HTN (hypertension), Hx of blood clots, Hyperlipidemia, Intestinal or peritoneal adhesions with obstruction (postoperative) (postinfection) ([address] Kidney infection, Lateral epicondylitis  of elbow, Muscle strain, Other abnormal glucose, Restless legs syndrome (RLS), Vitamin D deficiency, and Wears glasses. has a past surgical history that includes Cardiac catheterization; Ovary removal (1970); colectomy (1969); Appendectomy (1968); Ovary removal (1971); Hysterectomy (1973); Abdomen surgery (1976); Cardiac catheterization (2005); Bunionectomy (Left); sinus surgery (2004);  Colonoscopy; other surgical history glucose monitoring kit (FREESTYLE) monitoring kit 1 kit by Does not apply route daily 6/11/19  Yes Reyes Counts, APRN - CNP   SITagliptin (JANUVIA) 100 MG tablet Take 1 tablet by mouth daily 5/24/19  Yes Gaston Mortensen MD   acetaminophen (TYLENOL) 500 MG tablet Take 500 mg by mouth every 6 hours as needed for Pain   Yes Historical Provider, MD   gabapentin (NEURONTIN) 300 MG capsule Take 1 capsule by mouth nightly for 180 days. 3/22/19 9/18/19 Yes Gaston Mortensen MD   lidocaine (XYLOCAINE) 5 % ointment 4-5 times a day to your right thigh for pain. 3/15/19  Yes Dipak Gould MD   furosemide (LASIX) 40 MG tablet Take 1 tablet by mouth daily 2/19/19  Yes Gaston Mortensen MD   atorvastatin (LIPITOR) 80 MG tablet Take 1 tablet by mouth nightly 2/8/19  Yes Gaston Mortensen MD   nitroGLYCERIN (NITROSTAT) 0.4 MG SL tablet Place 1 tablet under the tongue every 5 minutes as needed for Chest pain 1/8/18  Yes Gaston Mortensen MD   Calcium Carbonate-Vitamin D (CALCIUM 500/D) 500-125 MG-UNIT TABS Take 1 tablet by mouth 2 times daily    Yes Historical Provider, MD       REVIEW OF SYSTEMS    (2-9 systems for level 4, 10 or more for level 5)      Review of Systems   Constitutional: Negative for diaphoresis, fatigue and fever. Respiratory: Negative for chest tightness and shortness of breath. Cardiovascular: Positive for leg swelling. Negative for chest pain and palpitations. Gastrointestinal: Positive for abdominal pain and blood in stool. Negative for nausea. Genitourinary: Negative for dysuria, enuresis and flank pain. Musculoskeletal: Positive for myalgias. Negative for arthralgias and joint swelling. Psychiatric/Behavioral: Negative for agitation and confusion.        PHYSICAL EXAM   (up to 7 for level 4, 8 or more for level 5)      INITIAL VITALS:   BP (!) 135/96   Pulse 92   Temp 98.2 °F (36.8 °C) (Oral)   Resp 18   Ht 5' 3\" (1.6 m)   Wt 158 lb (71.7 kg)   SpO2 97%   BMI 27.99 kg/m²     Physical Exam Constitutional: She is oriented to person, place, and time. She appears well-developed and well-nourished. No distress. HENT:   Head: Normocephalic and atraumatic. Right Ear: External ear normal.   Left Ear: External ear normal.   Eyes: Pupils are equal, round, and reactive to light. Conjunctivae and EOM are normal. Right eye exhibits no discharge. Left eye exhibits no discharge. Neck: Normal range of motion. Cardiovascular: Normal rate and regular rhythm. Pulmonary/Chest: No stridor. No respiratory distress. Abdominal: She exhibits no distension. There is no tenderness. There is no guarding. Mildly tender abdomen to palpation epigastrium. No distention   Genitourinary:   Genitourinary Comments: Rectal exam without bright red blood, Hemoccult stool test negative   Musculoskeletal:   Left lower extremity without swelling of calf, DP and PT pulses intact color and temperature symmetric able to wiggle toes no palpable cord no swelling of the knee or obvious effusion   Neurological: She is alert and oriented to person, place, and time. She displays normal reflexes. No cranial nerve deficit. She exhibits normal muscle tone. Coordination normal.   Skin: Skin is warm and dry. She is not diaphoretic.    Psychiatric:   She with odd affect, delayed responses, possible confusion       DIFFERENTIAL  DIAGNOSIS     PLAN (LABS / IMAGING / EKG):  Orders Placed This Encounter   Procedures    CBC Auto Differential    Comprehensive Metabolic Panel    Lipase    Protime-INR    Troponin    EKG 12 Lead       MEDICATIONS ORDERED:  Orders Placed This Encounter   Medications    cephALEXin (KEFLEX) capsule 500 mg    aluminum & magnesium hydroxide-simethicone (MAALOX) 200-200-20 MG/5ML suspension 30 mL    DISCONTD: lidocaine (XYLOCAINE) 4 % solution 4 mL    lidocaine viscous hcl (XYLOCAINE) 2 % solution 15 mL    0.9 % sodium chloride bolus    oxyCODONE-acetaminophen (PERCOCET) 5-325 MG per tablet 1 tablet DIAGNOSTIC RESULTS / EMERGENCY DEPARTMENT COURSE / MDM     LABS:  Results for orders placed or performed during the hospital encounter of 08/21/19   CBC Auto Differential   Result Value Ref Range    WBC 9.9 3.5 - 11.0 k/uL    RBC 3.64 (L) 4.0 - 5.2 m/uL    Hemoglobin 11.5 (L) 12.0 - 16.0 g/dL    Hematocrit 34.7 (L) 36 - 46 %    MCV 95.4 80 - 100 fL    MCH 31.4 26 - 34 pg    MCHC 33.0 31 - 37 g/dL    RDW 13.8 11.5 - 14.9 %    Platelets 580 585 - 770 k/uL    MPV 7.6 6.0 - 12.0 fL    NRBC Automated NOT REPORTED per 100 WBC    Differential Type NOT REPORTED     Seg Neutrophils 79 (H) 36 - 66 %    Lymphocytes 10 (L) 24 - 44 %    Monocytes 9 (H) 1 - 7 %    Eosinophils % 1 0 - 4 %    Basophils 1 0 - 2 %    Immature Granulocytes NOT REPORTED 0 %    Segs Absolute 7.90 1.3 - 9.1 k/uL    Absolute Lymph # 1.00 1.0 - 4.8 k/uL    Absolute Mono # 0.90 0.1 - 1.3 k/uL    Absolute Eos # 0.10 0.0 - 0.4 k/uL    Basophils # 0.10 0.0 - 0.2 k/uL    Absolute Immature Granulocyte NOT REPORTED 0.00 - 0.30 k/uL    WBC Morphology NOT REPORTED     RBC Morphology NOT REPORTED     Platelet Estimate NOT REPORTED    Comprehensive Metabolic Panel   Result Value Ref Range    Glucose 102 (H) 70 - 99 mg/dL    BUN 39 (H) 8 - 23 mg/dL    CREATININE 1.80 (H) 0.50 - 0.90 mg/dL    Bun/Cre Ratio NOT REPORTED 9 - 20    Calcium 9.5 8.6 - 10.4 mg/dL    Sodium 137 135 - 144 mmol/L    Potassium 3.4 (L) 3.7 - 5.3 mmol/L    Chloride 101 98 - 107 mmol/L    CO2 18 (L) 20 - 31 mmol/L    Anion Gap 18 (H) 9 - 17 mmol/L    Alkaline Phosphatase 48 35 - 104 U/L    ALT 14 5 - 33 U/L    AST 19 <32 U/L    Total Bilirubin 0.45 0.3 - 1.2 mg/dL    Total Protein 7.3 6.4 - 8.3 g/dL    Alb 4.3 3.5 - 5.2 g/dL    Albumin/Globulin Ratio NOT REPORTED 1.0 - 2.5    GFR Non-African American 28 (L) >60 mL/min    GFR  34 (L) >60 mL/min    GFR Comment          GFR Staging NOT REPORTED    Lipase   Result Value Ref Range    Lipase 50 13 - 60 U/L   Protime-INR   Result Value Ref Range    Protime 13.7 11.8 - 14.6 sec    INR 1.1    Troponin   Result Value Ref Range    Troponin, High Sensitivity 37 (H) 0 - 14 ng/L    Troponin T NOT REPORTED <0.03 ng/mL    Troponin Interp NOT REPORTED    EKG 12 Lead   Result Value Ref Range    Ventricular Rate 73 BPM    Atrial Rate 73 BPM    P-R Interval 144 ms    QRS Duration 74 ms    Q-T Interval 424 ms    QTc Calculation (Bazett) 467 ms    P Axis 49 degrees    R Axis 35 degrees    T Axis 51 degrees     RADIOLOGY:  None    EMERGENCY DEPARTMENT COURSE:  Patient with recurrent presentation for abdominal discomfort, history of gastric bleed secondary ulcers. Recent CT abdomen several days ago hemodynamically stable today. Patient with left leg pain history of DVT on right side. Had negative DVT study on the third. No obvious swelling no tachycardia or suggestion of PE at this point. Hemoccult stool test was negative. CBC CMP remarkable for BIN. Patient to be admitted for management of BIN, evaluation of ongoing possible bleeding. Possible placement in extended-care facility. Patient within normal limits hemoglobin not significant weight dropped hemodynamically stable. PROCEDURES:  None    CONSULTS:  None    CRITICAL CARE:  None    FINAL IMPRESSION      1. Acute renal failure, unspecified acute renal failure type ([facility] [address]          DISPOSITION / PLAN     DISPOSITION Decision To Admit 08/21/2019 07:31:10 PM      PATIENT REFERRED TO:  No follow-up provider specified.     DISCHARGE MEDICATIONS:  New Prescriptions    No medications on file       Jason Shrestha MD  Emergency Medicine Resident    (Please note that portions of thisnote were completed with a voice recognition program.  Efforts were made to edit the dictations but occasionally words are mis-transcribed.)       Jason Shrestha MD  Resident  08/21/19 [id number]

## 2019-08-22 ENCOUNTER — APPOINTMENT (OUTPATIENT)
Dept: ULTRASOUND IMAGING | Age: 69
DRG: 372 | End: 2019-08-22
Payer: MEDICARE

## 2019-08-22 PROBLEM — K92.1 MELENA: Status: ACTIVE | Noted: 2019-08-22

## 2019-08-22 PROBLEM — N39.0 UTI (URINARY TRACT INFECTION): Status: ACTIVE | Noted: 2019-08-22

## 2019-08-22 LAB
ALBUMIN SERPL-MCNC: 3.7 G/DL (ref 3.5–5.2)
ALBUMIN/GLOBULIN RATIO: ABNORMAL (ref 1–2.5)
ALP BLD-CCNC: 40 U/L (ref 35–104)
ALT SERPL-CCNC: 11 U/L (ref 5–33)
ANION GAP SERPL CALCULATED.3IONS-SCNC: 14 MMOL/L (ref 9–17)
AST SERPL-CCNC: 14 U/L
BILIRUB SERPL-MCNC: 0.54 MG/DL (ref 0.3–1.2)
BUN BLDV-MCNC: 38 MG/DL (ref 8–23)
BUN/CREAT BLD: ABNORMAL (ref 9–20)
C DIFF AG + TOXIN: ABNORMAL
C DIFFICILE TOXINS, PCR: ABNORMAL
CALCIUM SERPL-MCNC: 8.7 MG/DL (ref 8.6–10.4)
CHLORIDE BLD-SCNC: 104 MMOL/L (ref 98–107)
CO2: 20 MMOL/L (ref 20–31)
CREAT SERPL-MCNC: 1.3 MG/DL (ref 0.5–0.9)
DATE, STOOL #1: NORMAL
DATE, STOOL #2: NORMAL
DATE, STOOL #3: NORMAL
EKG ATRIAL RATE: 73 BPM
EKG P AXIS: 49 DEGREES
EKG P-R INTERVAL: 144 MS
EKG Q-T INTERVAL: 424 MS
EKG QRS DURATION: 74 MS
EKG QTC CALCULATION (BAZETT): 467 MS
EKG R AXIS: 35 DEGREES
EKG T AXIS: 51 DEGREES
EKG VENTRICULAR RATE: 73 BPM
GFR AFRICAN AMERICAN: 49 ML/MIN
GFR NON-AFRICAN AMERICAN: 41 ML/MIN
GFR SERPL CREATININE-BSD FRML MDRD: ABNORMAL ML/MIN/{1.73_M2}
GFR SERPL CREATININE-BSD FRML MDRD: ABNORMAL ML/MIN/{1.73_M2}
GLUCOSE BLD-MCNC: 105 MG/DL (ref 70–99)
HCT VFR BLD CALC: 29.7 % (ref 36–46)
HEMOCCULT SP1 STL QL: NEGATIVE
HEMOCCULT SP2 STL QL: NORMAL
HEMOCCULT SP3 STL QL: NORMAL
HEMOGLOBIN: 9.8 G/DL (ref 12–16)
INR BLD: 1.1
MAGNESIUM: 2.1 MG/DL (ref 1.6–2.6)
MCH RBC QN AUTO: 31.6 PG (ref 26–34)
MCHC RBC AUTO-ENTMCNC: 33.1 G/DL (ref 31–37)
MCV RBC AUTO: 95.5 FL (ref 80–100)
NRBC AUTOMATED: ABNORMAL PER 100 WBC
PDW BLD-RTO: 13.6 % (ref 11.5–14.9)
PLATELET # BLD: 307 K/UL (ref 150–450)
PMV BLD AUTO: 7 FL (ref 6–12)
POTASSIUM SERPL-SCNC: 3.5 MMOL/L (ref 3.7–5.3)
PROTHROMBIN TIME: 14.5 SEC (ref 11.8–14.6)
RBC # BLD: 3.11 M/UL (ref 4–5.2)
SODIUM BLD-SCNC: 138 MMOL/L (ref 135–144)
SODIUM,UR: 28 MMOL/L
SPECIMEN DESCRIPTION: ABNORMAL
SPECIMEN DESCRIPTION: ABNORMAL
SPECIMEN DESCRIPTION: NORMAL
TIME, STOOL #1: NORMAL
TIME, STOOL #2: NORMAL
TIME, STOOL #3: NORMAL
TOTAL PROTEIN, URINE: 117 MG/DL
TOTAL PROTEIN: 6 G/DL (ref 6.4–8.3)
WBC # BLD: 7 K/UL (ref 3.5–11)

## 2019-08-22 PROCEDURE — 1200000000 HC SEMI PRIVATE

## 2019-08-22 PROCEDURE — 6370000000 HC RX 637 (ALT 250 FOR IP): Performed by: NURSE PRACTITIONER

## 2019-08-22 PROCEDURE — 85027 COMPLETE CBC AUTOMATED: CPT

## 2019-08-22 PROCEDURE — 97162 PT EVAL MOD COMPLEX 30 MIN: CPT

## 2019-08-22 PROCEDURE — 85610 PROTHROMBIN TIME: CPT

## 2019-08-22 PROCEDURE — 99223 1ST HOSP IP/OBS HIGH 75: CPT | Performed by: INTERNAL MEDICINE

## 2019-08-22 PROCEDURE — 87493 C DIFF AMPLIFIED PROBE: CPT

## 2019-08-22 PROCEDURE — 2500000003 HC RX 250 WO HCPCS: Performed by: INTERNAL MEDICINE

## 2019-08-22 PROCEDURE — 2580000003 HC RX 258: Performed by: NURSE PRACTITIONER

## 2019-08-22 PROCEDURE — 83735 ASSAY OF MAGNESIUM: CPT

## 2019-08-22 PROCEDURE — 93010 ELECTROCARDIOGRAM REPORT: CPT | Performed by: INTERNAL MEDICINE

## 2019-08-22 PROCEDURE — 82272 OCCULT BLD FECES 1-3 TESTS: CPT

## 2019-08-22 PROCEDURE — 6370000000 HC RX 637 (ALT 250 FOR IP): Performed by: INTERNAL MEDICINE

## 2019-08-22 PROCEDURE — 80053 COMPREHEN METABOLIC PANEL: CPT

## 2019-08-22 PROCEDURE — 87449 NOS EACH ORGANISM AG IA: CPT

## 2019-08-22 PROCEDURE — 97116 GAIT TRAINING THERAPY: CPT

## 2019-08-22 PROCEDURE — 87324 CLOSTRIDIUM AG IA: CPT

## 2019-08-22 PROCEDURE — 36415 COLL VENOUS BLD VENIPUNCTURE: CPT

## 2019-08-22 PROCEDURE — 6360000002 HC RX W HCPCS: Performed by: INTERNAL MEDICINE

## 2019-08-22 PROCEDURE — 2580000003 HC RX 258: Performed by: INTERNAL MEDICINE

## 2019-08-22 PROCEDURE — 76770 US EXAM ABDO BACK WALL COMP: CPT

## 2019-08-22 PROCEDURE — 99222 1ST HOSP IP/OBS MODERATE 55: CPT | Performed by: INTERNAL MEDICINE

## 2019-08-22 PROCEDURE — 6360000002 HC RX W HCPCS: Performed by: NURSE PRACTITIONER

## 2019-08-22 RX ORDER — SUCRALFATE 1 G/1
1 TABLET ORAL EVERY 6 HOURS SCHEDULED
Status: DISCONTINUED | OUTPATIENT
Start: 2019-08-22 | End: 2019-08-26 | Stop reason: HOSPADM

## 2019-08-22 RX ORDER — MUSCLE RUB CREAM 100; 150 MG/G; MG/G
CREAM TOPICAL 3 TIMES DAILY
Status: DISCONTINUED | OUTPATIENT
Start: 2019-08-22 | End: 2019-08-26 | Stop reason: HOSPADM

## 2019-08-22 RX ORDER — ESOMEPRAZOLE SODIUM 40 MG/5ML
20 INJECTION INTRAVENOUS DAILY
Status: DISCONTINUED | OUTPATIENT
Start: 2019-08-22 | End: 2019-08-25

## 2019-08-22 RX ORDER — OXYCODONE HYDROCHLORIDE AND ACETAMINOPHEN 5; 325 MG/1; MG/1
1 TABLET ORAL EVERY 8 HOURS PRN
Status: DISCONTINUED | OUTPATIENT
Start: 2019-08-22 | End: 2019-08-26 | Stop reason: HOSPADM

## 2019-08-22 RX ORDER — POTASSIUM CHLORIDE 20 MEQ/1
40 TABLET, EXTENDED RELEASE ORAL PRN
Status: DISCONTINUED | OUTPATIENT
Start: 2019-08-22 | End: 2019-08-26 | Stop reason: HOSPADM

## 2019-08-22 RX ORDER — POTASSIUM CHLORIDE 7.45 MG/ML
10 INJECTION INTRAVENOUS PRN
Status: DISCONTINUED | OUTPATIENT
Start: 2019-08-22 | End: 2019-08-26 | Stop reason: HOSPADM

## 2019-08-22 RX ORDER — OXYCODONE AND ACETAMINOPHEN 7.5; 325 MG/1; MG/1
1 TABLET ORAL EVERY 8 HOURS PRN
Status: DISCONTINUED | OUTPATIENT
Start: 2019-08-22 | End: 2019-08-22 | Stop reason: CLARIF

## 2019-08-22 RX ORDER — 0.9 % SODIUM CHLORIDE 0.9 %
5 VIAL (ML) INJECTION DAILY
Status: DISCONTINUED | OUTPATIENT
Start: 2019-08-22 | End: 2019-08-25

## 2019-08-22 RX ORDER — CEPHALEXIN 500 MG/1
500 CAPSULE ORAL EVERY 12 HOURS SCHEDULED
Status: DISCONTINUED | OUTPATIENT
Start: 2019-08-22 | End: 2019-08-22

## 2019-08-22 RX ORDER — OXYCODONE HYDROCHLORIDE 5 MG/1
2.5 TABLET ORAL EVERY 8 HOURS PRN
Status: DISCONTINUED | OUTPATIENT
Start: 2019-08-22 | End: 2019-08-26 | Stop reason: HOSPADM

## 2019-08-22 RX ADMIN — CARVEDILOL 12.5 MG: 12.5 TABLET, FILM COATED ORAL at 20:51

## 2019-08-22 RX ADMIN — CALCIUM CARBONATE-CHOLECALCIFEROL TAB 250 MG-125 UNIT 500 MG: 250-125 TAB at 20:51

## 2019-08-22 RX ADMIN — OXYCODONE HYDROCHLORIDE 2.5 MG: 5 TABLET ORAL at 14:21

## 2019-08-22 RX ADMIN — OXYCODONE HYDROCHLORIDE 2.5 MG: 5 TABLET ORAL at 05:48

## 2019-08-22 RX ADMIN — MENTHOL, METHYL SALICYLATE: 10; 15 CREAM TOPICAL at 20:51

## 2019-08-22 RX ADMIN — CEPHALEXIN 500 MG: 500 CAPSULE ORAL at 10:26

## 2019-08-22 RX ADMIN — OXYCODONE HYDROCHLORIDE AND ACETAMINOPHEN 1 TABLET: 5; 325 TABLET ORAL at 22:27

## 2019-08-22 RX ADMIN — SODIUM CHLORIDE: 9 INJECTION, SOLUTION INTRAVENOUS at 00:01

## 2019-08-22 RX ADMIN — CALCIUM CARBONATE-CHOLECALCIFEROL TAB 250 MG-125 UNIT 500 MG: 250-125 TAB at 10:24

## 2019-08-22 RX ADMIN — CITALOPRAM HYDROBROMIDE 10 MG: 20 TABLET ORAL at 10:24

## 2019-08-22 RX ADMIN — CARVEDILOL 12.5 MG: 12.5 TABLET, FILM COATED ORAL at 10:24

## 2019-08-22 RX ADMIN — ESOMEPRAZOLE SODIUM 20 MG: 40 INJECTION INTRAVENOUS at 15:40

## 2019-08-22 RX ADMIN — Medication 5 ML: at 15:40

## 2019-08-22 RX ADMIN — OXYCODONE HYDROCHLORIDE AND ACETAMINOPHEN 1 TABLET: 5; 325 TABLET ORAL at 05:48

## 2019-08-22 RX ADMIN — HEPARIN SODIUM 5000 UNITS: 5000 INJECTION INTRAVENOUS; SUBCUTANEOUS at 05:38

## 2019-08-22 RX ADMIN — CEFTRIAXONE SODIUM 1 G: 1 INJECTION, POWDER, FOR SOLUTION INTRAMUSCULAR; INTRAVENOUS at 14:20

## 2019-08-22 RX ADMIN — DOCUSATE SODIUM 100 MG: 100 CAPSULE, LIQUID FILLED ORAL at 10:23

## 2019-08-22 RX ADMIN — HYDRALAZINE HYDROCHLORIDE 50 MG: 50 TABLET, FILM COATED ORAL at 10:23

## 2019-08-22 RX ADMIN — SODIUM CHLORIDE: 9 INJECTION, SOLUTION INTRAVENOUS at 05:42

## 2019-08-22 RX ADMIN — MENTHOL, METHYL SALICYLATE: 10; 15 CREAM TOPICAL at 15:34

## 2019-08-22 RX ADMIN — PRAMIPEXOLE DIHYDROCHLORIDE 1.5 MG: 1.5 TABLET ORAL at 20:51

## 2019-08-22 RX ADMIN — OXYCODONE HYDROCHLORIDE 2.5 MG: 5 TABLET ORAL at 22:27

## 2019-08-22 RX ADMIN — POTASSIUM CHLORIDE 40 MEQ: 1500 TABLET, EXTENDED RELEASE ORAL at 10:23

## 2019-08-22 RX ADMIN — SUCRALFATE 1 G: 1 TABLET ORAL at 20:54

## 2019-08-22 RX ADMIN — MENTHOL, METHYL SALICYLATE: 10; 15 CREAM TOPICAL at 10:24

## 2019-08-22 RX ADMIN — AMLODIPINE BESYLATE 5 MG: 5 TABLET ORAL at 10:23

## 2019-08-22 RX ADMIN — ATORVASTATIN CALCIUM 80 MG: 80 TABLET, FILM COATED ORAL at 20:51

## 2019-08-22 ASSESSMENT — ENCOUNTER SYMPTOMS
BACK PAIN: 0
SHORTNESS OF BREATH: 0
NAUSEA: 1
CONSTIPATION: 0
ABDOMINAL PAIN: 1
COUGH: 0
VOMITING: 0
DIARRHEA: 0
WHEEZING: 0
SORE THROAT: 0

## 2019-08-22 ASSESSMENT — PAIN SCALES - GENERAL
PAINLEVEL_OUTOF10: 5
PAINLEVEL_OUTOF10: 3
PAINLEVEL_OUTOF10: 5
PAINLEVEL_OUTOF10: 6
PAINLEVEL_OUTOF10: 8
PAINLEVEL_OUTOF10: 0
PAINLEVEL_OUTOF10: 4

## 2019-08-22 ASSESSMENT — PAIN DESCRIPTION - PAIN TYPE
TYPE: CHRONIC PAIN
TYPE: CHRONIC PAIN
TYPE: ACUTE PAIN
TYPE: CHRONIC PAIN

## 2019-08-22 ASSESSMENT — PAIN DESCRIPTION - PROGRESSION: CLINICAL_PROGRESSION: GRADUALLY WORSENING

## 2019-08-22 ASSESSMENT — PAIN DESCRIPTION - ORIENTATION
ORIENTATION: LEFT
ORIENTATION: LEFT;POSTERIOR
ORIENTATION: LEFT
ORIENTATION: LEFT

## 2019-08-22 ASSESSMENT — PAIN DESCRIPTION - ONSET: ONSET: ON-GOING

## 2019-08-22 ASSESSMENT — PAIN DESCRIPTION - DESCRIPTORS: DESCRIPTORS: ACHING

## 2019-08-22 ASSESSMENT — PAIN DESCRIPTION - FREQUENCY: FREQUENCY: INTERMITTENT

## 2019-08-22 ASSESSMENT — PAIN DESCRIPTION - LOCATION
LOCATION: LEG
LOCATION: KNEE

## 2019-08-22 NOTE — H&P
and Seasonal    REVIEW OF SYSTEMS     Review of Systems   Constitutional: Negative for chills, diaphoresis and fever. HENT: Negative for congestion and sore throat. Respiratory: Negative for cough, shortness of breath and wheezing. Cardiovascular: Negative for chest pain, palpitations and leg swelling. Gastrointestinal: Positive for abdominal pain and nausea (with dry heaves). Negative for constipation, diarrhea and vomiting. \"4 dark BMs today\"   Genitourinary: Negative for dysuria, frequency and urgency. Musculoskeletal: Positive for myalgias (left calf pain). Negative for back pain. Skin: Negative for rash. Neurological: Negative for dizziness, weakness and headaches. Psychiatric/Behavioral: The patient is not nervous/anxious. PHYSICAL EXAM      BP (!) 124/50   Pulse 71   Temp 98.8 °F (37.1 °C) (Oral)   Resp 18   Ht 5' 3\" (1.6 m)   Wt 155 lb 10.3 oz (70.6 kg)   SpO2 94%   BMI 27.57 kg/m²  Body mass index is 27.57 kg/m². Physical Exam   Constitutional: She is oriented to person, place, and time. She appears well-developed and well-nourished. No distress. HENT:   Head: Normocephalic and atraumatic. Mouth/Throat: Oropharynx is clear and moist.   Eyes: Pupils are equal, round, and reactive to light. Conjunctivae and EOM are normal.   Neck: Normal range of motion. Neck supple. No tracheal deviation present. Cardiovascular: Normal rate, regular rhythm, normal heart sounds and intact distal pulses. Exam reveals no gallop and no friction rub. No murmur heard. Pulmonary/Chest: Effort normal and breath sounds normal. No respiratory distress. She has no wheezes. She has no rales. She exhibits no tenderness. Abdominal: Soft. Bowel sounds are normal. She exhibits no distension. There is tenderness (lower abdomen/suprapubic region). There is no guarding. Musculoskeletal: Normal range of motion. She exhibits tenderness (left calf; entire calf muscle feels tight).  She exhibits no edema. Lymphadenopathy:     She has no cervical adenopathy. Neurological: She is alert and oriented to person, place, and time. She displays no seizure activity. Coordination normal.   Skin: Skin is warm and dry. No rash noted. She is not diaphoretic. No erythema. No pallor. Psychiatric: She has a normal mood and affect. Her behavior is normal. Thought content normal.     DIAGNOSTICS      EKG:   EKG 12 Lead [[id number]] Collected: 08/21/19 1904   Updated: 08/21/19 1906     Ventricular Rate 73 BPM    Atrial Rate 73 BPM    P-R Interval 144 ms    QRS Duration 74 ms    Q-T Interval 424 ms    QTc Calculation (Bazett) 467 ms    P Axis 49 degrees    R Axis 35 degrees    T Axis 51 degrees   Narrative:     Normal sinus rhythm  Normal ECG  When compared with ECG of 17-AUG-2019 21:02,  No significant change was found     Labs:  CBC:   Recent Labs     08/21/19 1815   WBC 9.9   HGB 11.5*        BMP:    Recent Labs     08/21/19 1815      K 3.4*      CO2 18*   BUN 39*   CREATININE 1.80*   GLUCOSE 102*     S. Calcium:  Recent Labs     08/21/19 1815   CALCIUM 9.5     S. Ionized Calcium:No results for input(s): IONCA in the last 72 hours. S. Magnesium:No results for input(s): MG in the last 72 hours. S. Phosphorus:No results for input(s): PHOS in the last 72 hours. S. Glucose:No results for input(s): POCGLU in the last 72 hours. Glycosylated hemoglobin A1C:   Lab Results   Component Value Date    LABA1C 5.7 06/11/2019     Hepatic:   Recent Labs     08/21/19  1815   AST 19   ALT 14   ALKPHOS 48     CARDIAC ENZY:   Recent Labs     08/21/19  1815   TROPHS 37*     INR:   Recent Labs     08/21/19 1815   INR 1.1     BNP: No results for input(s): PROBNP in the last 72 hours. ABGs: No results for input(s): PH, PCO2, PO2, HCO3, O2SAT in the last 72 hours. Lipids: No results for input(s): CHOL, TRIG, HDL, LDLCALC in the last 72 hours.     Invalid input(s): LDL  Pancreatic functions:  Recent Labs +------------------------------------+----------+---------------+----------+ ! Mid Femoral                         !Yes       ! Yes            ! None      ! +------------------------------------+----------+---------------+----------+ ! Dist Femoral                        !Partial   !Yes            ! None      ! +------------------------------------+----------+---------------+----------+ ! Deep Femoral                        !Yes       ! Yes            ! None      ! +------------------------------------+----------+---------------+----------+ ! Popliteal                           !Yes       ! Yes            ! None      ! +------------------------------------+----------+---------------+----------+ ! Sapheno Femoral Junction            ! Yes       ! Yes            ! None      ! +------------------------------------+----------+---------------+----------+ ! PTV                                 ! Partial   !Yes            ! None      ! +------------------------------------+----------+---------------+----------+ ! Peroneal                            !Partial   !Yes            ! None      ! +------------------------------------+----------+---------------+----------+ ! Gastroc                             ! Yes       ! Yes            ! None      ! +------------------------------------+----------+---------------+----------+ ! GSV Thigh                           ! Yes       ! Yes            ! None      ! +------------------------------------+----------+---------------+----------+ ! GSV Knee                            ! Yes       ! Yes            ! None      ! +------------------------------------+----------+---------------+----------+ ! GSV Ankle                           ! Yes       ! Yes            ! None      ! +------------------------------------+----------+---------------+----------+ ! SSV                                 ! Yes       ! Yes            ! None      ! +------------------------------------+----------+---------------+----------+    Cta Abdomen Pelvis W Wo Contrast    Result Date: 8/4/2019  EXAMINATION: CTA OF THE ABDOMEN AND PELVIS WITH AND WITHOUT CONTRAST 8/3/2019 5:45 pm TECHNIQUE: CTA of the abdomen and pelvis was performed without and with the administration of intravenous contrast. Multiplanar reformatted images are provided for review. MIP images are provided for review. Dose modulation, iterative reconstruction, and/or weight based adjustment of the mA/kV was utilized to reduce the radiation dose to as low as reasonably achievable. 3D surface reformatted and curved MIP images were submitted for review. COMPARISON: CT 12/18/2018 HISTORY: ORDERING SYSTEM PROVIDED HISTORY: GI Bleed TECHNOLOGIST PROVIDED HISTORY: Reason for Exam: PT STATES BLOOD IN STOOL X1 DAY Acuity: Acute Type of Exam: Initial FINDINGS: CTA ABDOMEN: The lung bases are clear. No pleural or pericardial effusions. Abdominal aorta has normal contour and caliber. Mild abdominal aortic atherosclerotic disease. Abdominal aortic branch vessels are patent. 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 The liver, spleen, pancreas, adrenal glands, and kidneys are normal. Gallbladder contains calcified gallstone. No biliary ductal dilatation. The unopacified stomach, duodenum, and small bowel are unremarkable. No small bowel obstruction. Colonic diverticulosis with some pericolic induration at the junction of the descending colon and sigmoid colon. No active extravasation from the GI tract. No free fluid or free air. No enlarged lymph nodes. CTA PELVIS: Iliac arteries demonstrate normal caliber and mild atherosclerotic disease. No iliac artery dissection or aneurysm. The bladder is under distended. Uterus is absent. 1. No aortoiliac aneurysm or dissection. No active hemorrhage from the GI tract. 2. Acute diverticulitis at the junction of the descending colon and sigmoid colon. 3. Uncomplicated cholelithiasis.      ASSESSMENT  and  PLAN     Principal Problem:    BIN (acute kidney

## 2019-08-22 NOTE — DISCHARGE INSTR - COC
{Prognosis:6030635681}    Condition at Discharge: Sotero Wilson Patient Condition:447116020}    Rehab Potential (if transferring to Rehab): {Prognosis:2624367422}    Recommended Labs or Other Treatments After Discharge: ***    Physician Certification: I certify the above information and transfer of Sherri Munguia  is necessary for the continuing treatment of the diagnosis listed and that she requires {Admit to Appropriate Level of Care:22207} for {GREATER/LESS:559026115} 30 days.      Update Admission H&P: {CHP DME Changes in BHDVO:032387974}    PHYSICIAN SIGNATURE:  {Esignature:933216859}

## 2019-08-22 NOTE — PLAN OF CARE
Problem: Falls - Risk of:  Goal: Will remain free from falls  Description  Will remain free from falls  Outcome: Ongoing  Goal: Absence of physical injury  Description  Absence of physical injury  Outcome: Ongoing     Problem: Pain:  Goal: Pain level will decrease  Description  Pain level will decrease  Outcome: Ongoing  Goal: Control of acute pain  Description  Control of acute pain  Outcome: Ongoing  Goal: Control of chronic pain  Description  Control of chronic pain  Outcome: Ongoing     Problem: Musculor/Skeletal Functional Status  Goal: Highest potential functional level  Outcome: Ongoing  Goal: Absence of falls  Outcome: Ongoing

## 2019-08-22 NOTE — PLAN OF CARE
Problem: Falls - Risk of:  Goal: Will remain free from falls  Description  Will remain free from falls  Outcome: Ongoing   Pt. Free of falls and injuries this shift. Problem: Pain:  Description  Pain management should include both nonpharmacologic and pharmacologic interventions. Goal: Pain level will decrease  Description  Pain level will decrease  Outcome: Ongoing   Adequate pain control achieved this shift. See MAR.

## 2019-08-22 NOTE — CONSULTS
diarrhea, constipation, abdominal pain  Genitourinary:  negative for frequency, dysuria  Integument:  negative for rash, skin lesions  Musculoskeletal: Positive muscle aches or joint pain  Neurological: Positive headaches, dizziness, lightheadedness, numbness, pain and tingling extrimities  Behavior/Psych:  negative for depression and positive anxiety    Code Status:  Full Code    Physical Exam:     Vitals:  BP (!) 121/40   Pulse 59   Temp 98.4 °F (36.9 °C) (Oral)   Resp 16   Ht 5' 3\" (1.6 m)   Wt 155 lb 10.3 oz (70.6 kg)   SpO2 97%   BMI 27.57 kg/m²   Temp (24hrs), Av.6 °F (37 °C), Min:98.2 °F (36.8 °C), Max:99 °F (37.2 °C)      General appearance - alert, well appearing, and in no acute distress  Mental status - oriented to person, place, and time with normal affect  Head - normocephalic and atraumatic  Eyes - pupils equal and reactive, extraocular eye movements intact, conjunctiva clear  Ears - hearing appears to be intact  Nose - no drainage noted  Mouth - mucous membranes moist  Neck - supple, no carotid bruits, thyroid not palpable  Chest -Rales to auscultation, normal effort  Heart - normal rate, regular rhythm, no murmurs  Abdomen - soft, mild tenderness, nondistended, bowel sounds present all four quadrants, no masses, hepatomegaly or splenomegaly.  No hernias  Neurological - normal speech, no focal findings or movement disorder noted, cranial nerves II through XII grossly intact  Extremities - peripheral pulses palpable, no pedal edema or calf pain with palpation  Skin - no gross lesions, rashes, or induration noted  Cranial Nerves : grossly intact  Lymph nodes: not palpable    Data:   CBC:   Lab Results   Component Value Date    WBC 7.0 2019    RBC 3.11 2019    HGB 9.8 2019    HCT 29.7 2019    MCV 95.5 2019    MCH 31.6 2019    MCHC 33.1 2019    RDW 13.6 2019     2019    MPV 7.0 2019     CBC with Differential:    Lab Results

## 2019-08-22 NOTE — PROGRESS NOTES
Wears glasses. has a past surgical history that includes Cardiac catheterization; Ovary removal (1970); colectomy (1969); Appendectomy (1968); Ovary removal (1971); Hysterectomy (1973); Abdomen surgery (1976); Cardiac catheterization (2005); Bunionectomy (Left); sinus surgery (2004); Colonoscopy; other surgical history (8/14/14); cyst removal (Right); Wrist fracture surgery (Left, 3/5/2019); Upper gastrointestinal endoscopy (N/A, 5/31/2019); and Upper gastrointestinal endoscopy (N/A, 8/5/2019). Restrictions  Restrictions/Precautions  Restrictions/Precautions: Fall Risk, Isolation(troponins 37 on 8-21-19, peripheral IV right antecubital, (+) C-dif)  Required Braces or Orthoses?: No  Implants present? : Metal implants(ORIF left wrist )  Vision/Hearing  Vision: Impaired  Vision Exceptions: Wears glasses at all times  Hearing: Within functional limits     Subjective  General  Patient assessed for rehabilitation services?: Yes  Response To Previous Treatment: Not applicable  Family / Caregiver Present: No  Referring Practitioner: Danish Gomez CNP  Referral Date : 08/22/19  Diagnosis: BIN  Follows Commands: Within Functional Limits  Other (Comment): OK per nurse Anton Schmitz to proceed w/ PT evaluation. General Comment  Comments: venous scan on 8-3-19 was negative for a DVT. CT of the abdomen shows healing nondisplaced fractures left lateral 8th- 10th ribs. Subjective  Subjective: pt reports that she was admitted due to bloody stools, dry heaves x 2 days and weakness. Pt reports that she fell in her room and injured her left wrist on March 1, 2019. Pt had  an ORIF left wrist by Dr. Christopher aCdena. Pt reports difficulty walkng due to \" a thing on my leg. \"- very vague regarding symptoms  Pt states she has no energy and initially refuses to dangle at the EOB, transfer to chair or walk. Pt agreed to walk w/ much encouragement.   Pain Screening  Patient Currently in Pain: Yes(denies at rest)  Pain Assessment  Pain Assessment: assistance  Stand to sit: Stand by assistance  Bed to Chair: Stand by assistance(used s cane)  Stand Pivot Transfers: Stand by assistance(used s cane)  Comment: therapist noticed 2 capsules on the bed after standing the pt  up. When the therapist questioned the patient, she reported \" that they are my tylenol. I like to keep 2 pills with me at all times. \"  Therapist placed the pills on the nurse  and informed nurse [employer]  Ambulation  Ambulation?: Yes  Ambulation 1  Surface: level tile  Device: Single point cane  Assistance: Contact guard assistance  Gait Deviations: Decreased step length;Decreased step height  Distance: 15' x 1 then 25' x 1  Comments: tends to walk flat footed on left- states she has pain in the posterior  left knee limiting mobility, states she feels unsteady- will advance to w walker at next treatment  Stairs/Curb  Stairs?: No     Balance  Sitting - Static: Good  Sitting - Dynamic: Good  Standing - Static: Good;-(used s cane)  Standing - Dynamic: [address]  Times per week: 5-7 treatments/ week  Times per day: (5-7 treatments/ week)  Specific instructions for Next Treatment: 8-22-19 Home w/ assist: gait w/ s cane 15' and 25' w/ CGA x 1, advance to 1 step, try w walker at next treatment  Current Treatment Recommendations: Strengthening, Transfer Training, Patient/Caregiver Education & Training, Equipment Evaluation, Education, & procurement, Balance Training, Gait Training, Home Exercise Program, Functional Mobility Training, Stair training, Safety Education & Training  Safety Devices  Type of devices:  All fall risk precautions in place, Gait belt, Patient at risk for falls, Call light within reach, Left in chair, Nurse notified(nurse [employer])    G-Code       OutComes Score                                                  AM-PAC Score  AM-PAC Inpatient Mobility Raw Score : 16 (08/22/19 1107)  AM-PAC Inpatient T-Scale Score : 40.78 (08/22/19 1107)  Mobility Inpatient CMS 0-100%

## 2019-08-23 ENCOUNTER — ANESTHESIA (OUTPATIENT)
Dept: ENDOSCOPY | Age: 69
DRG: 372 | End: 2019-08-23
Payer: MEDICARE

## 2019-08-23 ENCOUNTER — ANESTHESIA EVENT (OUTPATIENT)
Dept: ENDOSCOPY | Age: 69
DRG: 372 | End: 2019-08-23
Payer: MEDICARE

## 2019-08-23 VITALS — DIASTOLIC BLOOD PRESSURE: 65 MMHG | OXYGEN SATURATION: 93 % | SYSTOLIC BLOOD PRESSURE: 139 MMHG

## 2019-08-23 LAB
ALBUMIN SERPL-MCNC: 3.9 G/DL (ref 3.5–5.2)
ALBUMIN/GLOBULIN RATIO: ABNORMAL (ref 1–2.5)
ALP BLD-CCNC: 47 U/L (ref 35–104)
ALT SERPL-CCNC: 12 U/L (ref 5–33)
ANION GAP SERPL CALCULATED.3IONS-SCNC: 13 MMOL/L (ref 9–17)
AST SERPL-CCNC: 16 U/L
BILIRUB SERPL-MCNC: 0.46 MG/DL (ref 0.3–1.2)
BUN BLDV-MCNC: 18 MG/DL (ref 8–23)
BUN/CREAT BLD: ABNORMAL (ref 9–20)
CALCIUM SERPL-MCNC: 9.2 MG/DL (ref 8.6–10.4)
CHLORIDE BLD-SCNC: 104 MMOL/L (ref 98–107)
CO2: 20 MMOL/L (ref 20–31)
CREAT SERPL-MCNC: 0.62 MG/DL (ref 0.5–0.9)
GFR AFRICAN AMERICAN: >60 ML/MIN
GFR NON-AFRICAN AMERICAN: >60 ML/MIN
GFR SERPL CREATININE-BSD FRML MDRD: ABNORMAL ML/MIN/{1.73_M2}
GFR SERPL CREATININE-BSD FRML MDRD: ABNORMAL ML/MIN/{1.73_M2}
GLUCOSE BLD-MCNC: 102 MG/DL (ref 65–105)
GLUCOSE BLD-MCNC: 117 MG/DL (ref 70–99)
HCT VFR BLD CALC: 32.1 % (ref 36–46)
HEMOGLOBIN: 10.8 G/DL (ref 12–16)
MCH RBC QN AUTO: 31.9 PG (ref 26–34)
MCHC RBC AUTO-ENTMCNC: 33.6 G/DL (ref 31–37)
MCV RBC AUTO: 95 FL (ref 80–100)
NRBC AUTOMATED: ABNORMAL PER 100 WBC
PDW BLD-RTO: 13.8 % (ref 11.5–14.9)
PLATELET # BLD: 325 K/UL (ref 150–450)
PMV BLD AUTO: 7.3 FL (ref 6–12)
POTASSIUM SERPL-SCNC: 4.1 MMOL/L (ref 3.7–5.3)
RBC # BLD: 3.38 M/UL (ref 4–5.2)
SODIUM BLD-SCNC: 137 MMOL/L (ref 135–144)
TOTAL PROTEIN: 6.7 G/DL (ref 6.4–8.3)
WBC # BLD: 7.2 K/UL (ref 3.5–11)

## 2019-08-23 PROCEDURE — 6370000000 HC RX 637 (ALT 250 FOR IP): Performed by: NURSE PRACTITIONER

## 2019-08-23 PROCEDURE — 6370000000 HC RX 637 (ALT 250 FOR IP): Performed by: INTERNAL MEDICINE

## 2019-08-23 PROCEDURE — 6360000002 HC RX W HCPCS: Performed by: NURSE ANESTHETIST, CERTIFIED REGISTERED

## 2019-08-23 PROCEDURE — 2500000003 HC RX 250 WO HCPCS: Performed by: INTERNAL MEDICINE

## 2019-08-23 PROCEDURE — 88305 TISSUE EXAM BY PATHOLOGIST: CPT

## 2019-08-23 PROCEDURE — 43239 EGD BIOPSY SINGLE/MULTIPLE: CPT | Performed by: INTERNAL MEDICINE

## 2019-08-23 PROCEDURE — 2709999900 HC NON-CHARGEABLE SUPPLY: Performed by: INTERNAL MEDICINE

## 2019-08-23 PROCEDURE — 1200000000 HC SEMI PRIVATE

## 2019-08-23 PROCEDURE — 82947 ASSAY GLUCOSE BLOOD QUANT: CPT

## 2019-08-23 PROCEDURE — 2580000003 HC RX 258: Performed by: INTERNAL MEDICINE

## 2019-08-23 PROCEDURE — 2580000003 HC RX 258: Performed by: NURSE ANESTHETIST, CERTIFIED REGISTERED

## 2019-08-23 PROCEDURE — 88342 IMHCHEM/IMCYTCHM 1ST ANTB: CPT

## 2019-08-23 PROCEDURE — 3609012400 HC EGD TRANSORAL BIOPSY SINGLE/MULTIPLE: Performed by: INTERNAL MEDICINE

## 2019-08-23 PROCEDURE — 2580000003 HC RX 258: Performed by: NURSE PRACTITIONER

## 2019-08-23 PROCEDURE — 99232 SBSQ HOSP IP/OBS MODERATE 35: CPT | Performed by: INTERNAL MEDICINE

## 2019-08-23 PROCEDURE — 3700000000 HC ANESTHESIA ATTENDED CARE: Performed by: INTERNAL MEDICINE

## 2019-08-23 PROCEDURE — 2500000003 HC RX 250 WO HCPCS: Performed by: NURSE ANESTHETIST, CERTIFIED REGISTERED

## 2019-08-23 PROCEDURE — 85027 COMPLETE CBC AUTOMATED: CPT

## 2019-08-23 PROCEDURE — 0DB78ZX EXCISION OF STOMACH, PYLORUS, VIA NATURAL OR ARTIFICIAL OPENING ENDOSCOPIC, DIAGNOSTIC: ICD-10-PCS | Performed by: INTERNAL MEDICINE

## 2019-08-23 PROCEDURE — 80053 COMPREHEN METABOLIC PANEL: CPT

## 2019-08-23 PROCEDURE — 7100000001 HC PACU RECOVERY - ADDTL 15 MIN: Performed by: INTERNAL MEDICINE

## 2019-08-23 PROCEDURE — 6360000002 HC RX W HCPCS: Performed by: INTERNAL MEDICINE

## 2019-08-23 PROCEDURE — 36415 COLL VENOUS BLD VENIPUNCTURE: CPT

## 2019-08-23 PROCEDURE — 7100000000 HC PACU RECOVERY - FIRST 15 MIN: Performed by: INTERNAL MEDICINE

## 2019-08-23 RX ORDER — LIDOCAINE HYDROCHLORIDE 10 MG/ML
INJECTION, SOLUTION EPIDURAL; INFILTRATION; INTRACAUDAL; PERINEURAL PRN
Status: DISCONTINUED | OUTPATIENT
Start: 2019-08-23 | End: 2019-08-23 | Stop reason: SDUPTHER

## 2019-08-23 RX ORDER — SODIUM CHLORIDE, SODIUM LACTATE, POTASSIUM CHLORIDE, CALCIUM CHLORIDE 600; 310; 30; 20 MG/100ML; MG/100ML; MG/100ML; MG/100ML
INJECTION, SOLUTION INTRAVENOUS CONTINUOUS PRN
Status: DISCONTINUED | OUTPATIENT
Start: 2019-08-23 | End: 2019-08-23 | Stop reason: SDUPTHER

## 2019-08-23 RX ORDER — PROPOFOL 10 MG/ML
INJECTION, EMULSION INTRAVENOUS PRN
Status: DISCONTINUED | OUTPATIENT
Start: 2019-08-23 | End: 2019-08-23 | Stop reason: SDUPTHER

## 2019-08-23 RX ORDER — AMLODIPINE BESYLATE 5 MG/1
5 TABLET ORAL DAILY
Status: DISCONTINUED | OUTPATIENT
Start: 2019-08-23 | End: 2019-08-24

## 2019-08-23 RX ADMIN — SUCRALFATE 1 G: 1 TABLET ORAL at 17:30

## 2019-08-23 RX ADMIN — SUCRALFATE 1 G: 1 TABLET ORAL at 05:19

## 2019-08-23 RX ADMIN — PRAMIPEXOLE DIHYDROCHLORIDE 1.5 MG: 1.5 TABLET ORAL at 20:23

## 2019-08-23 RX ADMIN — SODIUM CHLORIDE: 9 INJECTION, SOLUTION INTRAVENOUS at 23:15

## 2019-08-23 RX ADMIN — SODIUM CHLORIDE, POTASSIUM CHLORIDE, SODIUM LACTATE AND CALCIUM CHLORIDE: 600; 310; 30; 20 INJECTION, SOLUTION INTRAVENOUS at 09:55

## 2019-08-23 RX ADMIN — OXYCODONE HYDROCHLORIDE AND ACETAMINOPHEN 1 TABLET: 5; 325 TABLET ORAL at 22:31

## 2019-08-23 RX ADMIN — CARVEDILOL 12.5 MG: 12.5 TABLET, FILM COATED ORAL at 11:22

## 2019-08-23 RX ADMIN — PROPOFOL 100 MG: 10 INJECTION, EMULSION INTRAVENOUS at 09:57

## 2019-08-23 RX ADMIN — CALCIUM CARBONATE-CHOLECALCIFEROL TAB 250 MG-125 UNIT 500 MG: 250-125 TAB at 20:23

## 2019-08-23 RX ADMIN — PROPOFOL 20 MG: 10 INJECTION, EMULSION INTRAVENOUS at 10:02

## 2019-08-23 RX ADMIN — CARVEDILOL 12.5 MG: 12.5 TABLET, FILM COATED ORAL at 20:23

## 2019-08-23 RX ADMIN — CITALOPRAM HYDROBROMIDE 10 MG: 20 TABLET ORAL at 11:21

## 2019-08-23 RX ADMIN — VANCOMYCIN HYDROCHLORIDE 125 MG: KIT at 11:34

## 2019-08-23 RX ADMIN — ATORVASTATIN CALCIUM 80 MG: 80 TABLET, FILM COATED ORAL at 20:23

## 2019-08-23 RX ADMIN — Medication 250 MG: at 05:20

## 2019-08-23 RX ADMIN — OXYCODONE HYDROCHLORIDE AND ACETAMINOPHEN 1 TABLET: 5; 325 TABLET ORAL at 14:25

## 2019-08-23 RX ADMIN — Medication 5 ML: at 11:08

## 2019-08-23 RX ADMIN — AMLODIPINE BESYLATE 5 MG: 5 TABLET ORAL at 14:30

## 2019-08-23 RX ADMIN — VANCOMYCIN HYDROCHLORIDE 125 MG: KIT at 17:30

## 2019-08-23 RX ADMIN — SUCRALFATE 1 G: 1 TABLET ORAL at 11:20

## 2019-08-23 RX ADMIN — LIDOCAINE HYDROCHLORIDE 25 MG: 10 INJECTION, SOLUTION EPIDURAL; INFILTRATION; INTRACAUDAL; PERINEURAL at 09:57

## 2019-08-23 RX ADMIN — VANCOMYCIN HYDROCHLORIDE 125 MG: KIT at 23:15

## 2019-08-23 RX ADMIN — SUCRALFATE 1 G: 1 TABLET ORAL at 23:15

## 2019-08-23 RX ADMIN — CALCIUM CARBONATE-CHOLECALCIFEROL TAB 250 MG-125 UNIT 500 MG: 250-125 TAB at 11:21

## 2019-08-23 RX ADMIN — OXYCODONE HYDROCHLORIDE 2.5 MG: 5 TABLET ORAL at 22:31

## 2019-08-23 RX ADMIN — SODIUM CHLORIDE: 9 INJECTION, SOLUTION INTRAVENOUS at 16:19

## 2019-08-23 RX ADMIN — Medication 250 MG: at 00:20

## 2019-08-23 RX ADMIN — OXYCODONE HYDROCHLORIDE 2.5 MG: 5 TABLET ORAL at 06:23

## 2019-08-23 RX ADMIN — CEFTRIAXONE SODIUM 1 G: 1 INJECTION, POWDER, FOR SOLUTION INTRAMUSCULAR; INTRAVENOUS at 14:09

## 2019-08-23 RX ADMIN — OXYCODONE HYDROCHLORIDE 2.5 MG: 5 TABLET ORAL at 14:25

## 2019-08-23 RX ADMIN — ESOMEPRAZOLE SODIUM 20 MG: 40 INJECTION INTRAVENOUS at 11:08

## 2019-08-23 RX ADMIN — SODIUM CHLORIDE: 9 INJECTION, SOLUTION INTRAVENOUS at 00:20

## 2019-08-23 RX ADMIN — OXYCODONE HYDROCHLORIDE AND ACETAMINOPHEN 1 TABLET: 5; 325 TABLET ORAL at 06:23

## 2019-08-23 ASSESSMENT — PULMONARY FUNCTION TESTS
PIF_VALUE: 0

## 2019-08-23 ASSESSMENT — PAIN - FUNCTIONAL ASSESSMENT: PAIN_FUNCTIONAL_ASSESSMENT: 0-10

## 2019-08-23 ASSESSMENT — PAIN SCALES - GENERAL
PAINLEVEL_OUTOF10: 2
PAINLEVEL_OUTOF10: 5
PAINLEVEL_OUTOF10: 3
PAINLEVEL_OUTOF10: 5
PAINLEVEL_OUTOF10: 4
PAINLEVEL_OUTOF10: 0
PAINLEVEL_OUTOF10: 3
PAINLEVEL_OUTOF10: 5

## 2019-08-23 ASSESSMENT — PAIN DESCRIPTION - ORIENTATION
ORIENTATION: LEFT
ORIENTATION: LEFT;OTHER (COMMENT)
ORIENTATION: LEFT
ORIENTATION: LEFT

## 2019-08-23 ASSESSMENT — PAIN DESCRIPTION - LOCATION
LOCATION: LEG
LOCATION: BACK;LEG;HEAD
LOCATION: LEG
LOCATION: LEG

## 2019-08-23 ASSESSMENT — PAIN DESCRIPTION - PAIN TYPE
TYPE: CHRONIC PAIN
TYPE: CHRONIC PAIN
TYPE: ACUTE PAIN
TYPE: CHRONIC PAIN

## 2019-08-23 ASSESSMENT — PAIN DESCRIPTION - DESCRIPTORS
DESCRIPTORS: ACHING;DISCOMFORT
DESCRIPTORS: ACHING

## 2019-08-23 ASSESSMENT — ENCOUNTER SYMPTOMS: SHORTNESS OF BREATH: 1

## 2019-08-23 ASSESSMENT — PAIN DESCRIPTION - FREQUENCY: FREQUENCY: CONTINUOUS

## 2019-08-23 NOTE — ANESTHESIA POSTPROCEDURE EVALUATION
Department of Anesthesiology  Postprocedure Note    Patient: Rebekah Bond  MRN: [id number]  YOB: 1950  Date of evaluation: 8/23/2019  Time:  10:34 AM     Procedure Summary     Date:  08/23/19 Room / Location:  [facility] ENDO 01 / [facility]    Anesthesia Start:  7580 Anesthesia Stop:  2006    Procedure:  EGD BIOPSY (N/A Esophagus) Diagnosis:       (R/O GI BLEEDING)      (RECHECK ULCER)    Surgeon:  Lawyer Brooks MD Responsible Provider:  Stephanie Rodriguez MD    Anesthesia Type:  MAC ASA Status:  3          Anesthesia Type: MAC    Elver Phase I: Elver Score: 9    Elver Phase II:      Last vitals: Reviewed and per EMR flowsheets.        Anesthesia Post Evaluation    Comments: POST- ANESTHESIA EVALUATION       Pt Name: Rebekah Bond  MRN: [id number]  YOB: 1950  Date of evaluation: 8/23/2019  Time:  10:35 AM      BP (!) 161/59   Pulse 66   Temp 97.7 °F (36.5 °C)   Resp 14   Ht 5' 3\" (1.6 m)   Wt 156 lb 15.5 oz (71.2 kg)   SpO2 98%   BMI 27.81 kg/m²      Consciousness Level  Awake  Cardiopulmonary Status  Stable  Pain Adequately Treated YES  Nausea / Vomiting  NO  Adequate Hydration  YES  Anesthesia Related Complications NONE      Electronically signed by Stephanie Rodriguez MD on 8/23/2019 at 10:35 AM

## 2019-08-23 NOTE — OP NOTE
PROCEDURE NOTE    DATE OF PROCEDURE: 8/23/2019     SURGEON: Yogi Miller MD    ASSISTANT: None    PREOPERATIVE DIAGNOSIS: GI BLEEDING  ABD PAINS  MELENA    POSTOPERATIVE DIAGNOSIS: As described below    OPERATION: Upper GI endoscopy with Biopsy    ANESTHESIA: Moderate Sedation     ESTIMATED BLOOD LOSS: Less than 50 ml    COMPLICATIONS: None. SPECIMENS:  Was Obtained:     HISTORY: The patient is a 77y.o. year old female with history of above preop diagnosis. I recommended esophagogastroduodenoscopy with possible biopsy and I explained the risk, benefits, expected outcome, and alternatives to the procedure. Risks included but are not limited to bleeding, infection, respiratory distress, hypotension, and perforation of the esophagus, stomach, or duodenum. Patient understands and is in agreement. PROCEDURE: The patient was given IV conscious sedation. The patient's SPO2 remained above 90% throughout the procedure. The gastroscope was inserted orally and advanced under direct vision through the esophagus, through the stomach, through the pylorus, and into the descending duodenum. Findings:    Retropharyngeal area was grossly normal appearing    Esophagus: normal    Stomach:    Fundus: normal    Body: normal    Antrum: abnormal: MILD GASTRITIS BIOPSIES WERE TAKEN FOR H PYLORI    Duodenum:     Descending: normal    Bulb: abnormal: HEALING DUODENAL ULCER CLOSER TO PYLORUS PICTURES WERE TAKEN NO STIGMATA  OF BLEEDING    The scope was removed and the patient tolerated the procedure well. Recommendations/Plan:   1. F/U Biopsies  2. CONT PPI AND CARAFATE  3. F/U HGB  4. F/U In Office in 3-4 weeks  5. Discussed with the family  6.  Post sedation patient was stable with stable vital signs and stable O2 saturations    Electronically signed by Yogi Miller MD  on 8/23/2019 at 10:01 AM

## 2019-08-24 LAB
ALBUMIN SERPL-MCNC: 3.7 G/DL (ref 3.5–5.2)
ALBUMIN/GLOBULIN RATIO: ABNORMAL (ref 1–2.5)
ALP BLD-CCNC: 44 U/L (ref 35–104)
ALT SERPL-CCNC: 11 U/L (ref 5–33)
ANION GAP SERPL CALCULATED.3IONS-SCNC: 10 MMOL/L (ref 9–17)
AST SERPL-CCNC: 13 U/L
BILIRUB SERPL-MCNC: 0.35 MG/DL (ref 0.3–1.2)
BUN BLDV-MCNC: 10 MG/DL (ref 8–23)
BUN/CREAT BLD: ABNORMAL (ref 9–20)
CALCIUM SERPL-MCNC: 8.5 MG/DL (ref 8.6–10.4)
CHLORIDE BLD-SCNC: 106 MMOL/L (ref 98–107)
CO2: 23 MMOL/L (ref 20–31)
CREAT SERPL-MCNC: 0.62 MG/DL (ref 0.5–0.9)
GFR AFRICAN AMERICAN: >60 ML/MIN
GFR NON-AFRICAN AMERICAN: >60 ML/MIN
GFR SERPL CREATININE-BSD FRML MDRD: ABNORMAL ML/MIN/{1.73_M2}
GFR SERPL CREATININE-BSD FRML MDRD: ABNORMAL ML/MIN/{1.73_M2}
GLUCOSE BLD-MCNC: 107 MG/DL (ref 70–99)
GLUCOSE BLD-MCNC: 88 MG/DL (ref 65–105)
GLUCOSE BLD-MCNC: 90 MG/DL (ref 65–105)
GLUCOSE BLD-MCNC: 95 MG/DL (ref 65–105)
HCT VFR BLD CALC: 29.8 % (ref 36–46)
HEMOGLOBIN: 10.2 G/DL (ref 12–16)
MCH RBC QN AUTO: 32.4 PG (ref 26–34)
MCHC RBC AUTO-ENTMCNC: 34.2 G/DL (ref 31–37)
MCV RBC AUTO: 94.8 FL (ref 80–100)
NRBC AUTOMATED: ABNORMAL PER 100 WBC
PDW BLD-RTO: 13.7 % (ref 11.5–14.9)
PLATELET # BLD: 292 K/UL (ref 150–450)
PMV BLD AUTO: 7.3 FL (ref 6–12)
POTASSIUM SERPL-SCNC: 3.6 MMOL/L (ref 3.7–5.3)
RBC # BLD: 3.15 M/UL (ref 4–5.2)
SODIUM BLD-SCNC: 139 MMOL/L (ref 135–144)
TOTAL PROTEIN: 5.9 G/DL (ref 6.4–8.3)
WBC # BLD: 4.9 K/UL (ref 3.5–11)

## 2019-08-24 PROCEDURE — APPNB30 APP NON BILLABLE TIME 0-30 MINS: Performed by: NURSE PRACTITIONER

## 2019-08-24 PROCEDURE — 36415 COLL VENOUS BLD VENIPUNCTURE: CPT

## 2019-08-24 PROCEDURE — 2580000003 HC RX 258: Performed by: INTERNAL MEDICINE

## 2019-08-24 PROCEDURE — 99233 SBSQ HOSP IP/OBS HIGH 50: CPT | Performed by: INTERNAL MEDICINE

## 2019-08-24 PROCEDURE — 2500000003 HC RX 250 WO HCPCS: Performed by: INTERNAL MEDICINE

## 2019-08-24 PROCEDURE — 6370000000 HC RX 637 (ALT 250 FOR IP): Performed by: INTERNAL MEDICINE

## 2019-08-24 PROCEDURE — 82947 ASSAY GLUCOSE BLOOD QUANT: CPT

## 2019-08-24 PROCEDURE — 80053 COMPREHEN METABOLIC PANEL: CPT

## 2019-08-24 PROCEDURE — 85027 COMPLETE CBC AUTOMATED: CPT

## 2019-08-24 PROCEDURE — 93926 LOWER EXTREMITY STUDY: CPT

## 2019-08-24 PROCEDURE — 99232 SBSQ HOSP IP/OBS MODERATE 35: CPT | Performed by: INTERNAL MEDICINE

## 2019-08-24 PROCEDURE — 1200000000 HC SEMI PRIVATE

## 2019-08-24 RX ORDER — AMLODIPINE BESYLATE 10 MG/1
10 TABLET ORAL DAILY
Status: DISCONTINUED | OUTPATIENT
Start: 2019-08-25 | End: 2019-08-26 | Stop reason: HOSPADM

## 2019-08-24 RX ORDER — POTASSIUM CHLORIDE 20 MEQ/1
20 TABLET, EXTENDED RELEASE ORAL 2 TIMES DAILY WITH MEALS
Status: DISCONTINUED | OUTPATIENT
Start: 2019-08-24 | End: 2019-08-26 | Stop reason: HOSPADM

## 2019-08-24 RX ADMIN — VANCOMYCIN HYDROCHLORIDE 125 MG: KIT at 17:32

## 2019-08-24 RX ADMIN — Medication 5 ML: at 08:50

## 2019-08-24 RX ADMIN — PRAMIPEXOLE DIHYDROCHLORIDE 1.5 MG: 1.5 TABLET ORAL at 21:34

## 2019-08-24 RX ADMIN — CARVEDILOL 12.5 MG: 12.5 TABLET, FILM COATED ORAL at 08:51

## 2019-08-24 RX ADMIN — ONDANSETRON HYDROCHLORIDE 4 MG: 4 TABLET, FILM COATED ORAL at 03:44

## 2019-08-24 RX ADMIN — SUCRALFATE 1 G: 1 TABLET ORAL at 17:32

## 2019-08-24 RX ADMIN — VANCOMYCIN HYDROCHLORIDE 125 MG: KIT at 05:44

## 2019-08-24 RX ADMIN — OXYCODONE HYDROCHLORIDE AND ACETAMINOPHEN 1 TABLET: 5; 325 TABLET ORAL at 07:40

## 2019-08-24 RX ADMIN — VANCOMYCIN HYDROCHLORIDE 125 MG: KIT at 23:29

## 2019-08-24 RX ADMIN — CITALOPRAM HYDROBROMIDE 10 MG: 20 TABLET ORAL at 08:51

## 2019-08-24 RX ADMIN — ATORVASTATIN CALCIUM 80 MG: 80 TABLET, FILM COATED ORAL at 21:34

## 2019-08-24 RX ADMIN — CALCIUM CARBONATE-CHOLECALCIFEROL TAB 250 MG-125 UNIT 500 MG: 250-125 TAB at 21:34

## 2019-08-24 RX ADMIN — SODIUM CHLORIDE: 9 INJECTION, SOLUTION INTRAVENOUS at 18:08

## 2019-08-24 RX ADMIN — MENTHOL, METHYL SALICYLATE: 10; 15 CREAM TOPICAL at 21:35

## 2019-08-24 RX ADMIN — POTASSIUM CHLORIDE 20 MEQ: 20 TABLET, EXTENDED RELEASE ORAL at 12:26

## 2019-08-24 RX ADMIN — CARVEDILOL 12.5 MG: 12.5 TABLET, FILM COATED ORAL at 21:34

## 2019-08-24 RX ADMIN — AMLODIPINE BESYLATE 5 MG: 5 TABLET ORAL at 08:51

## 2019-08-24 RX ADMIN — ESOMEPRAZOLE SODIUM 20 MG: 40 INJECTION INTRAVENOUS at 08:50

## 2019-08-24 RX ADMIN — OXYCODONE HYDROCHLORIDE 2.5 MG: 5 TABLET ORAL at 16:00

## 2019-08-24 RX ADMIN — SUCRALFATE 1 G: 1 TABLET ORAL at 12:26

## 2019-08-24 RX ADMIN — SUCRALFATE 1 G: 1 TABLET ORAL at 05:44

## 2019-08-24 RX ADMIN — SUCRALFATE 1 G: 1 TABLET ORAL at 23:29

## 2019-08-24 RX ADMIN — OXYCODONE HYDROCHLORIDE AND ACETAMINOPHEN 1 TABLET: 5; 325 TABLET ORAL at 16:00

## 2019-08-24 RX ADMIN — OXYCODONE HYDROCHLORIDE 2.5 MG: 5 TABLET ORAL at 07:40

## 2019-08-24 RX ADMIN — MENTHOL, METHYL SALICYLATE: 10; 15 CREAM TOPICAL at 08:51

## 2019-08-24 RX ADMIN — POTASSIUM CHLORIDE 20 MEQ: 20 TABLET, EXTENDED RELEASE ORAL at 17:32

## 2019-08-24 RX ADMIN — VANCOMYCIN HYDROCHLORIDE 125 MG: KIT at 12:26

## 2019-08-24 RX ADMIN — CALCIUM CARBONATE-CHOLECALCIFEROL TAB 250 MG-125 UNIT 500 MG: 250-125 TAB at 08:51

## 2019-08-24 ASSESSMENT — PAIN DESCRIPTION - PAIN TYPE: TYPE: CHRONIC PAIN

## 2019-08-24 ASSESSMENT — PAIN DESCRIPTION - LOCATION: LOCATION: LEG

## 2019-08-24 ASSESSMENT — PAIN SCALES - GENERAL
PAINLEVEL_OUTOF10: 5
PAINLEVEL_OUTOF10: 5
PAINLEVEL_OUTOF10: 3

## 2019-08-24 ASSESSMENT — PAIN DESCRIPTION - ORIENTATION: ORIENTATION: LEFT

## 2019-08-24 NOTE — PROGRESS NOTES
[place] [address] Internal Medicine    Progress Note    8/24/2019    11:30 AM    Name:   Jeremiah Freeman  MRN:     [id number]     Acct:      [de-identified]   Room:   [facility] [place] [address] Day:  3  Admit Date:  8/21/2019  5:57 PM    PCP:   Gaston Mortensen MD  Code Status:  Full Code    Subjective:     C/C:   Chief Complaint   Patient presents with    Abdominal Pain    Leg Pain    Melena     Interval History Status: significantly improved. HPI:   Patient was admitted with black colored stools, chronic pain in left leg. She underwent EGD. Which was suggestive of healing ulcer. She was treated with IV antibiotics for E. coli and urine. She does not have any symptoms of UTI, her blood in stools has improved, she has not had any bowel movement since redness today  Patient still complaining of pain in left leg, pain is like charley horses. She has history of smoking. She had ultrasound Doppler of left leg which was negative for DVT on 3 August.    Review of Systems:     Constitutional:  negative for chills, fevers, sweats  Respiratory:  negative for cough, dyspnea on exertion, hemoptysis, shortness of breath, wheezing  Cardiovascular:  negative for chest pain, chest pressure/discomfort, lower extremity edema, palpitations  Gastrointestinal:  Positive for  abdominal pain, no constipation, diarrhea, nausea, vomiting  Neurological:  negative for dizziness, headache  EXTREMITY - left ;leg pain   Medications: Allergies:     Allergies   Allergen Reactions    Bactrim [Sulfamethoxazole-Trimethoprim] Other (See Comments)     sepsis    Codeine Itching    Seasonal        Current Meds:   Scheduled Meds:    potassium chloride  20 mEq Oral BID WC    [START ON 8/25/2019] amLODIPine  10 mg Oral Daily    vancomycin  125 mg Oral 4 times per day    muscle rub   Topical TID    esomeprazole  20 mg Intravenous Daily    And    sodium chloride (PF)  5 mL Intravenous Daily    sucralfate  1 g Oral 4 heart failure  5. Hypertension, uncontrolled, will increase dose of Norvasc to 10 mg once a day  6. Left leg pain, patient does not have dorsalis pedis pulsation, clinically concerning for peripheral vascular disease with history of smoking, will do arterial scan of left leg  7. SCD for DVT prophylaxis  8. Will encourage diet today, likely discharge home tomorrow after results of arterial scan. 9 her urine is positive for E. coli, but patient does not have any symptoms. Will discontinue antibiotics with asymptomatic bacteriuria with positive C. difficile infection.   Ninfa Dotson MD  8/24/2019  11:30 AM

## 2019-08-24 NOTE — PROGRESS NOTES
Labs     08/21/19  1815 08/22/19  0631 08/23/19  0707   ALKPHOS 48 40 47   ALT 14 11 12   AST 19 14 16   PROT 7.3 6.0* 6.7   BILITOT 0.45 0.54 0.46   LABALBU 4.3 3.7 3.9       AMYLASE/LIPASE/AMMONIA  Recent Labs     08/21/19  1815   LIPASE 50       PT/INR  Recent Labs     08/21/19  1815 08/22/19  0631   PROTIME 13.7 14.5   INR 1.1 1.1     EGD DR RAH TOMLIN 8/23/19  Retropharyngeal area was grossly normal appearing     Esophagus: normal     Stomach:    Fundus: normal    Body: normal    Antrum: abnormal: MILD GASTRITIS BIOPSIES WERE TAKEN FOR H PYLORI     Duodenum:     Descending: normal    Bulb: abnormal: HEALING DUODENAL ULCER CLOSER TO PYLORUS PICTURES WERE TAKEN NO STIGMATA  OF BLEEDING      ASSESSMENT/PLAN:  1. Melena currently resolved s/p egd yesterday showing gastritis  -continue ppi (nexium per pharmacy) and carafate  -keep hgb >7  -await biopsy results    2. Possible c diff- await final testing results on oral vanco      This plan was formulated in collaboration with Dr. Harlan Wagner. Electronically signed by: MAIK Butterfield CNP on 8/24/2019 at 7:27 AM     Attending Physician Statement  I have discussed the care of Rebekah Bond and   I have examined the patient myselft independently, and taken ros and hpi , including pertinent history and exam findings,  with the author of this note . I have reviewed the key elements of all parts of the encounter with the nurse practitioner/resident.     I agree with the assessment, plan and orders as documented by the above health care provider   Addition/summary:  No more diarrhea since Wednesday  Doing well even the nausea is a lot better  No sign of bleeding  Continue to monitor H&H  Continue PPI  Await pathology and treat H. pylori if positive  Avoid nonsteroidal anti-inflammatory  Stable GI wise      Electronically signed by Khadar Hammond MD

## 2019-08-25 PROBLEM — A49.8 CLOSTRIDIUM DIFFICILE INFECTION: Status: ACTIVE | Noted: 2019-08-25

## 2019-08-25 LAB
ALBUMIN SERPL-MCNC: 3.5 G/DL (ref 3.5–5.2)
ALBUMIN/GLOBULIN RATIO: ABNORMAL (ref 1–2.5)
ALP BLD-CCNC: 46 U/L (ref 35–104)
ALT SERPL-CCNC: 10 U/L (ref 5–33)
ANION GAP SERPL CALCULATED.3IONS-SCNC: 12 MMOL/L (ref 9–17)
AST SERPL-CCNC: 21 U/L
BILIRUB SERPL-MCNC: 0.28 MG/DL (ref 0.3–1.2)
BUN BLDV-MCNC: 9 MG/DL (ref 8–23)
BUN/CREAT BLD: ABNORMAL (ref 9–20)
CALCIUM SERPL-MCNC: 8.8 MG/DL (ref 8.6–10.4)
CHLORIDE BLD-SCNC: 105 MMOL/L (ref 98–107)
CO2: 22 MMOL/L (ref 20–31)
CREAT SERPL-MCNC: 0.58 MG/DL (ref 0.5–0.9)
GFR AFRICAN AMERICAN: >60 ML/MIN
GFR NON-AFRICAN AMERICAN: >60 ML/MIN
GFR SERPL CREATININE-BSD FRML MDRD: ABNORMAL ML/MIN/{1.73_M2}
GFR SERPL CREATININE-BSD FRML MDRD: ABNORMAL ML/MIN/{1.73_M2}
GLUCOSE BLD-MCNC: 109 MG/DL (ref 65–105)
GLUCOSE BLD-MCNC: 114 MG/DL (ref 65–105)
GLUCOSE BLD-MCNC: 124 MG/DL (ref 65–105)
GLUCOSE BLD-MCNC: 188 MG/DL (ref 70–99)
GLUCOSE BLD-MCNC: 93 MG/DL (ref 65–105)
HCT VFR BLD CALC: 29.1 % (ref 36–46)
HEMOGLOBIN: 10 G/DL (ref 12–16)
MCH RBC QN AUTO: 32.6 PG (ref 26–34)
MCHC RBC AUTO-ENTMCNC: 34.2 G/DL (ref 31–37)
MCV RBC AUTO: 95.3 FL (ref 80–100)
MYOGLOBIN: 26 NG/ML (ref 25–58)
NRBC AUTOMATED: ABNORMAL PER 100 WBC
PDW BLD-RTO: 13.6 % (ref 11.5–14.9)
PLATELET # BLD: 274 K/UL (ref 150–450)
PMV BLD AUTO: 7.1 FL (ref 6–12)
POTASSIUM SERPL-SCNC: 3.7 MMOL/L (ref 3.7–5.3)
RBC # BLD: 3.05 M/UL (ref 4–5.2)
SEDIMENTATION RATE, ERYTHROCYTE: 12 MM (ref 0–20)
SODIUM BLD-SCNC: 139 MMOL/L (ref 135–144)
TOTAL CK: 50 U/L (ref 26–192)
TOTAL PROTEIN: 6.1 G/DL (ref 6.4–8.3)
WBC # BLD: 5.5 K/UL (ref 3.5–11)

## 2019-08-25 PROCEDURE — 2580000003 HC RX 258: Performed by: INTERNAL MEDICINE

## 2019-08-25 PROCEDURE — 99232 SBSQ HOSP IP/OBS MODERATE 35: CPT | Performed by: INTERNAL MEDICINE

## 2019-08-25 PROCEDURE — 6360000002 HC RX W HCPCS: Performed by: INTERNAL MEDICINE

## 2019-08-25 PROCEDURE — 1200000000 HC SEMI PRIVATE

## 2019-08-25 PROCEDURE — 82947 ASSAY GLUCOSE BLOOD QUANT: CPT

## 2019-08-25 PROCEDURE — 85651 RBC SED RATE NONAUTOMATED: CPT

## 2019-08-25 PROCEDURE — 2500000003 HC RX 250 WO HCPCS: Performed by: INTERNAL MEDICINE

## 2019-08-25 PROCEDURE — APPNB30 APP NON BILLABLE TIME 0-30 MINS: Performed by: NURSE PRACTITIONER

## 2019-08-25 PROCEDURE — 6370000000 HC RX 637 (ALT 250 FOR IP): Performed by: INTERNAL MEDICINE

## 2019-08-25 PROCEDURE — 85027 COMPLETE CBC AUTOMATED: CPT

## 2019-08-25 PROCEDURE — 82550 ASSAY OF CK (CPK): CPT

## 2019-08-25 PROCEDURE — 36415 COLL VENOUS BLD VENIPUNCTURE: CPT

## 2019-08-25 PROCEDURE — 80053 COMPREHEN METABOLIC PANEL: CPT

## 2019-08-25 PROCEDURE — 83874 ASSAY OF MYOGLOBIN: CPT

## 2019-08-25 RX ORDER — HYDRALAZINE HYDROCHLORIDE 20 MG/ML
10 INJECTION INTRAMUSCULAR; INTRAVENOUS EVERY 6 HOURS PRN
Status: DISCONTINUED | OUTPATIENT
Start: 2019-08-25 | End: 2019-08-26 | Stop reason: HOSPADM

## 2019-08-25 RX ORDER — PANTOPRAZOLE SODIUM 40 MG/1
40 TABLET, DELAYED RELEASE ORAL
Status: DISCONTINUED | OUTPATIENT
Start: 2019-08-26 | End: 2019-08-26 | Stop reason: HOSPADM

## 2019-08-25 RX ADMIN — SUCRALFATE 1 G: 1 TABLET ORAL at 05:54

## 2019-08-25 RX ADMIN — OXYCODONE HYDROCHLORIDE AND ACETAMINOPHEN 1 TABLET: 5; 325 TABLET ORAL at 16:01

## 2019-08-25 RX ADMIN — SUCRALFATE 1 G: 1 TABLET ORAL at 23:55

## 2019-08-25 RX ADMIN — VANCOMYCIN HYDROCHLORIDE 125 MG: KIT at 05:55

## 2019-08-25 RX ADMIN — ATORVASTATIN CALCIUM 80 MG: 80 TABLET, FILM COATED ORAL at 20:14

## 2019-08-25 RX ADMIN — CARVEDILOL 12.5 MG: 12.5 TABLET, FILM COATED ORAL at 20:14

## 2019-08-25 RX ADMIN — CARVEDILOL 12.5 MG: 12.5 TABLET, FILM COATED ORAL at 09:05

## 2019-08-25 RX ADMIN — SUCRALFATE 1 G: 1 TABLET ORAL at 17:30

## 2019-08-25 RX ADMIN — POTASSIUM CHLORIDE 20 MEQ: 20 TABLET, EXTENDED RELEASE ORAL at 09:04

## 2019-08-25 RX ADMIN — ONDANSETRON HYDROCHLORIDE 4 MG: 2 INJECTION, SOLUTION INTRAMUSCULAR; INTRAVENOUS at 06:45

## 2019-08-25 RX ADMIN — SODIUM CHLORIDE: 9 INJECTION, SOLUTION INTRAVENOUS at 20:22

## 2019-08-25 RX ADMIN — CALCIUM CARBONATE-CHOLECALCIFEROL TAB 250 MG-125 UNIT 500 MG: 250-125 TAB at 20:22

## 2019-08-25 RX ADMIN — Medication 5 ML: at 11:33

## 2019-08-25 RX ADMIN — VANCOMYCIN HYDROCHLORIDE 125 MG: KIT at 17:30

## 2019-08-25 RX ADMIN — SODIUM CHLORIDE: 9 INJECTION, SOLUTION INTRAVENOUS at 05:56

## 2019-08-25 RX ADMIN — OXYCODONE HYDROCHLORIDE AND ACETAMINOPHEN 1 TABLET: 5; 325 TABLET ORAL at 00:03

## 2019-08-25 RX ADMIN — AMLODIPINE BESYLATE 10 MG: 10 TABLET ORAL at 09:04

## 2019-08-25 RX ADMIN — POTASSIUM CHLORIDE 20 MEQ: 20 TABLET, EXTENDED RELEASE ORAL at 17:30

## 2019-08-25 RX ADMIN — VANCOMYCIN HYDROCHLORIDE 125 MG: KIT at 23:55

## 2019-08-25 RX ADMIN — PRAMIPEXOLE DIHYDROCHLORIDE 1.5 MG: 1.5 TABLET ORAL at 20:14

## 2019-08-25 RX ADMIN — OXYCODONE HYDROCHLORIDE 2.5 MG: 5 TABLET ORAL at 16:02

## 2019-08-25 RX ADMIN — HYDRALAZINE HYDROCHLORIDE 10 MG: 20 INJECTION INTRAMUSCULAR; INTRAVENOUS at 11:20

## 2019-08-25 RX ADMIN — VANCOMYCIN HYDROCHLORIDE 125 MG: KIT at 11:33

## 2019-08-25 RX ADMIN — CITALOPRAM HYDROBROMIDE 10 MG: 20 TABLET ORAL at 09:05

## 2019-08-25 RX ADMIN — MENTHOL, METHYL SALICYLATE: 10; 15 CREAM TOPICAL at 20:15

## 2019-08-25 RX ADMIN — CALCIUM CARBONATE-CHOLECALCIFEROL TAB 250 MG-125 UNIT 500 MG: 250-125 TAB at 09:05

## 2019-08-25 RX ADMIN — OXYCODONE HYDROCHLORIDE 2.5 MG: 5 TABLET ORAL at 00:03

## 2019-08-25 RX ADMIN — SUCRALFATE 1 G: 1 TABLET ORAL at 11:33

## 2019-08-25 RX ADMIN — ESOMEPRAZOLE SODIUM 20 MG: 40 INJECTION INTRAVENOUS at 11:33

## 2019-08-25 ASSESSMENT — PAIN SCALES - GENERAL
PAINLEVEL_OUTOF10: 4
PAINLEVEL_OUTOF10: 5

## 2019-08-25 ASSESSMENT — PAIN DESCRIPTION - PAIN TYPE: TYPE: CHRONIC PAIN

## 2019-08-25 ASSESSMENT — PAIN DESCRIPTION - ORIENTATION: ORIENTATION: LEFT

## 2019-08-25 ASSESSMENT — PAIN DESCRIPTION - LOCATION: LOCATION: LEG

## 2019-08-25 NOTE — PROGRESS NOTES
[facility] 52 Internal Medicine    Progress Note    8/25/2019    9:08 AM    Name:   Liam Mazariegos  MRN:     [id number]     Acct:      [de-identified]   Room:   [id number]  IP Day:  4  Admit Date:  8/21/2019  5:57 PM    PCP:   Zahra Lemons MD  Code Status:  Full Code    Subjective:     C/C:   Chief Complaint   Patient presents with    Abdominal Pain    Leg Pain    Melena     Interval History Status: significantly improved. HPI:   Patient was admitted with black colored stools, chronic pain in left leg. She underwent EGD. Which was suggestive of healing ulcer. She was treated with IV antibiotics for E. coli and urine. She does not have any symptoms of UTI, her blood in stools has improved, she has not had any bowel movement since redness today  Patient still complaining of pain in left leg, pain is like charley horses. She has history of smoking. She had ultrasound Doppler of left leg which was negative for DVT on 3 August.    8/25   Patient still complaining of pain in left leg, arterial Doppler is negative for arterial insufficiency  Patient is not taking her antihypertensive medication, she mentioned that she is too nauseous to take the medication. Her blood pressure is running very high    Review of Systems:     Constitutional:  negative for chills, fevers, sweats  Respiratory:  negative for cough, dyspnea on exertion, hemoptysis, shortness of breath, wheezing  Cardiovascular:  negative for chest pain, chest pressure/discomfort, lower extremity edema, palpitations  Gastrointestinal:  Positive for  abdominal pain, no constipation, diarrhea, nausea, vomiting  Neurological:  negative for dizziness, headache  EXTREMITY - left ;leg pain   Medications: Allergies:     Allergies   Allergen Reactions    Bactrim [Sulfamethoxazole-Trimethoprim] Other (See Comments)     sepsis    Codeine Itching    Seasonal        Current Meds:   Scheduled Meds:    potassium chloride  20 mEq Oral BID WC    amLODIPine  10 mg Oral Daily    vancomycin  125 mg Oral 4 times per day    muscle rub   Topical TID    esomeprazole  20 mg Intravenous Daily    And    sodium chloride (PF)  5 mL Intravenous Daily    sucralfate  1 g Oral 4 times per day    atorvastatin  80 mg Oral Nightly    oyster shell calcium/vitamin D  2 tablet Oral BID    carvedilol  12.5 mg Oral BID    citalopram  10 mg Oral Daily    pramipexole  1.5 mg Oral Daily    sodium chloride flush  10 mL Intravenous 2 times per day     Continuous Infusions:    sodium chloride 75 mL/hr at 08/25/19 0556     PRN Meds: hydrALAZINE, oxyCODONE-acetaminophen **AND** oxyCODONE, potassium chloride **OR** potassium alternative oral replacement **OR** potassium chloride, lidocaine, nitroGLYCERIN, ondansetron, sodium chloride flush, ondansetron, acetaminophen    Data:     Past Medical History:   has a past medical history of Allergic rhinitis, cause unspecified, Back pain, Bowel obstruction ([facility] [address] CAD (coronary artery disease), Cellulitis, Cellulitis, Diverticulosis of colon (without mention of hemorrhage), GERD (gastroesophageal reflux disease), History of MI (myocardial infarction), History of ovarian cyst, History of peritonitis, HTN (hypertension), Hx of blood clots, Hyperlipidemia, Intestinal or peritoneal adhesions with obstruction (postoperative) (postinfection) ([facility] [address] Kidney infection, Lateral epicondylitis  of elbow, Muscle strain, Other abnormal glucose, Restless legs syndrome (RLS), Vitamin D deficiency, and Wears glasses. Social History:   reports that she quit smoking about 2 years ago. Her smoking use included cigarettes. She started smoking about 24 years ago. She has a 10.00 pack-year smoking history. She has never used smokeless tobacco. She reports that she does not drink alcohol or use drugs.      Family History:   Family History   Problem Relation Age of Onset    Stroke Mother     Diabetes Mother     Heart Disease Mother    Petr

## 2019-08-26 VITALS
WEIGHT: 158.51 LBS | TEMPERATURE: 98.4 F | HEIGHT: 63 IN | HEART RATE: 59 BPM | RESPIRATION RATE: 16 BRPM | DIASTOLIC BLOOD PRESSURE: 48 MMHG | SYSTOLIC BLOOD PRESSURE: 124 MMHG | BODY MASS INDEX: 28.09 KG/M2 | OXYGEN SATURATION: 99 %

## 2019-08-26 LAB
ALBUMIN SERPL-MCNC: 3.8 G/DL (ref 3.5–5.2)
ALBUMIN/GLOBULIN RATIO: ABNORMAL (ref 1–2.5)
ALP BLD-CCNC: 51 U/L (ref 35–104)
ALT SERPL-CCNC: 14 U/L (ref 5–33)
ANION GAP SERPL CALCULATED.3IONS-SCNC: 13 MMOL/L (ref 9–17)
AST SERPL-CCNC: 14 U/L
BILIRUB SERPL-MCNC: 0.27 MG/DL (ref 0.3–1.2)
BUN BLDV-MCNC: 10 MG/DL (ref 8–23)
BUN/CREAT BLD: ABNORMAL (ref 9–20)
CALCIUM SERPL-MCNC: 8.7 MG/DL (ref 8.6–10.4)
CHLORIDE BLD-SCNC: 106 MMOL/L (ref 98–107)
CO2: 23 MMOL/L (ref 20–31)
CREAT SERPL-MCNC: 0.7 MG/DL (ref 0.5–0.9)
GFR AFRICAN AMERICAN: >60 ML/MIN
GFR NON-AFRICAN AMERICAN: >60 ML/MIN
GFR SERPL CREATININE-BSD FRML MDRD: ABNORMAL ML/MIN/{1.73_M2}
GFR SERPL CREATININE-BSD FRML MDRD: ABNORMAL ML/MIN/{1.73_M2}
GLUCOSE BLD-MCNC: 124 MG/DL (ref 65–105)
GLUCOSE BLD-MCNC: 126 MG/DL (ref 70–99)
GLUCOSE BLD-MCNC: 90 MG/DL (ref 65–105)
GLUCOSE BLD-MCNC: 97 MG/DL (ref 65–105)
HCT VFR BLD CALC: 30.6 % (ref 36–46)
HEMOGLOBIN: 10.3 G/DL (ref 12–16)
MCH RBC QN AUTO: 32.3 PG (ref 26–34)
MCHC RBC AUTO-ENTMCNC: 33.7 G/DL (ref 31–37)
MCV RBC AUTO: 95.7 FL (ref 80–100)
NRBC AUTOMATED: ABNORMAL PER 100 WBC
PDW BLD-RTO: 13.9 % (ref 11.5–14.9)
PLATELET # BLD: 293 K/UL (ref 150–450)
PMV BLD AUTO: 6.9 FL (ref 6–12)
POTASSIUM SERPL-SCNC: 3.6 MMOL/L (ref 3.7–5.3)
RBC # BLD: 3.2 M/UL (ref 4–5.2)
SODIUM BLD-SCNC: 142 MMOL/L (ref 135–144)
TOTAL PROTEIN: 6.1 G/DL (ref 6.4–8.3)
WBC # BLD: 5.7 K/UL (ref 3.5–11)

## 2019-08-26 PROCEDURE — 80053 COMPREHEN METABOLIC PANEL: CPT

## 2019-08-26 PROCEDURE — 85027 COMPLETE CBC AUTOMATED: CPT

## 2019-08-26 PROCEDURE — 6370000000 HC RX 637 (ALT 250 FOR IP): Performed by: INTERNAL MEDICINE

## 2019-08-26 PROCEDURE — 36415 COLL VENOUS BLD VENIPUNCTURE: CPT

## 2019-08-26 PROCEDURE — 82947 ASSAY GLUCOSE BLOOD QUANT: CPT

## 2019-08-26 PROCEDURE — 97116 GAIT TRAINING THERAPY: CPT

## 2019-08-26 PROCEDURE — 97110 THERAPEUTIC EXERCISES: CPT

## 2019-08-26 PROCEDURE — 99239 HOSP IP/OBS DSCHRG MGMT >30: CPT | Performed by: INTERNAL MEDICINE

## 2019-08-26 RX ORDER — POTASSIUM CHLORIDE 20 MEQ/1
20 TABLET, EXTENDED RELEASE ORAL DAILY
Qty: 30 TABLET | Refills: 5 | Status: SHIPPED | OUTPATIENT
Start: 2019-08-26 | End: 2019-10-09

## 2019-08-26 RX ADMIN — OXYCODONE HYDROCHLORIDE AND ACETAMINOPHEN 1 TABLET: 5; 325 TABLET ORAL at 09:05

## 2019-08-26 RX ADMIN — SUCRALFATE 1 G: 1 TABLET ORAL at 06:09

## 2019-08-26 RX ADMIN — SUCRALFATE 1 G: 1 TABLET ORAL at 11:09

## 2019-08-26 RX ADMIN — OXYCODONE HYDROCHLORIDE AND ACETAMINOPHEN 1 TABLET: 5; 325 TABLET ORAL at 17:00

## 2019-08-26 RX ADMIN — SUCRALFATE 1 G: 1 TABLET ORAL at 18:02

## 2019-08-26 RX ADMIN — OXYCODONE HYDROCHLORIDE AND ACETAMINOPHEN 1 TABLET: 5; 325 TABLET ORAL at 00:02

## 2019-08-26 RX ADMIN — CALCIUM CARBONATE-CHOLECALCIFEROL TAB 250 MG-125 UNIT 500 MG: 250-125 TAB at 09:05

## 2019-08-26 RX ADMIN — VANCOMYCIN HYDROCHLORIDE 125 MG: KIT at 11:09

## 2019-08-26 RX ADMIN — OXYCODONE HYDROCHLORIDE 2.5 MG: 5 TABLET ORAL at 09:05

## 2019-08-26 RX ADMIN — MENTHOL, METHYL SALICYLATE: 10; 15 CREAM TOPICAL at 09:08

## 2019-08-26 RX ADMIN — PANTOPRAZOLE SODIUM 40 MG: 40 TABLET, DELAYED RELEASE ORAL at 06:09

## 2019-08-26 RX ADMIN — CITALOPRAM HYDROBROMIDE 10 MG: 20 TABLET ORAL at 09:05

## 2019-08-26 RX ADMIN — OXYCODONE HYDROCHLORIDE 2.5 MG: 5 TABLET ORAL at 00:02

## 2019-08-26 RX ADMIN — AMLODIPINE BESYLATE 10 MG: 10 TABLET ORAL at 09:05

## 2019-08-26 RX ADMIN — VANCOMYCIN HYDROCHLORIDE 125 MG: KIT at 18:01

## 2019-08-26 RX ADMIN — POTASSIUM CHLORIDE 20 MEQ: 20 TABLET, EXTENDED RELEASE ORAL at 09:05

## 2019-08-26 RX ADMIN — CARVEDILOL 12.5 MG: 12.5 TABLET, FILM COATED ORAL at 09:06

## 2019-08-26 RX ADMIN — VANCOMYCIN HYDROCHLORIDE 125 MG: KIT at 06:09

## 2019-08-26 RX ADMIN — OXYCODONE HYDROCHLORIDE 2.5 MG: 5 TABLET ORAL at 17:00

## 2019-08-26 ASSESSMENT — PAIN DESCRIPTION - LOCATION: LOCATION: LEG

## 2019-08-26 ASSESSMENT — PAIN SCALES - GENERAL
PAINLEVEL_OUTOF10: 4
PAINLEVEL_OUTOF10: 5
PAINLEVEL_OUTOF10: 4

## 2019-08-26 ASSESSMENT — PAIN DESCRIPTION - ORIENTATION: ORIENTATION: LEFT

## 2019-08-26 ASSESSMENT — PAIN DESCRIPTION - PAIN TYPE: TYPE: CHRONIC PAIN

## 2019-08-26 NOTE — PROGRESS NOTES
Mobility:   Bed Mobility  Rolling: Independent;Rolling Right  Supine to Sit: Independent  Scooting: Independent(to EOB)  Bed mobility  Scooting: Independent(to EOB)    Transfers:  Sit to Stand: Modified independent  Stand to sit: Modified independent                 Ambulation 1  Surface: level tile  Device: Rolling Walker  Assistance: Supervision;Stand by assistance  Quality of Gait: antalgic gait due to pain in LLE, slower tawanda  Gait Deviations: Decreased step length;Decreased step height;Slow Tawanda  Distance: 80ft  Comments: Pt left sitting up in chair at bedside. Reported it felt good to bed up out of bed. Stairs/Curb  Stairs?: Yes  Stairs  # Steps : 2  Stairs Height: 8\"  Rails: Right ascending  Device: No Device  Assistance: Contact guard assistance  Comment: Pt ed to lead up with her (R)LE using a step to pattern to assist with pain control /safety due to LLE pain. BALANCE Posture: Good  Sitting - Static: Good  Sitting - Dynamic: Good  Standing - Static: Good;-(w/RW)  Standing - Dynamic: Good;-(w/RW)    EXERCISES    Other exercises?: Yes  Other exercises 1: (B) LE AP's and LAQ's x 15  Other exercises 2: (B) LE standing ex x 10(not able to kika L hip ext due to causing increase pain)           Activity Tolerance: Patient Tolerated treatment well, Patient limited by pain  PT Equipment Recommendations  Equipment Needed: No(has cane and rollator at home)  Other Comments  Comments: Pt reports she is a patient at the pain clinic. Thinks she has an appt Sept 9th. Discussused possible LLE pain being linked to back/sciatic pain.     Current Treatment Recommendations: Strengthening, Transfer Training, Patient/Caregiver Education & Training, Equipment Evaluation, Education, & procurement, Balance Training, Gait Training, Home Exercise Program, Functional Mobility Training, Stair training, Safety Education & Training    Conditions Requiring Skilled Therapeutic Intervention  Body structures, Functions,

## 2019-08-26 NOTE — DISCHARGE SUMMARY
chronic kidney disease (HCC)    Type 2 diabetes mellitus with circulatory disorder, without long-term current use of insulin (HCC)    Chronic deep vein thrombosis (DVT) of popliteal vein of right lower extremity (HCC)    Closed fracture of left wrist    B12 deficiency    Iron deficiency anemia secondary to inadequate dietary iron intake    Diarrhea due to malabsorption    Closed head injury    Scalp laceration    Abnormal finding on imaging    Other irritable bowel syndrome    Frequent falls    Double vision    Muscle soreness    GI bleed    Peptic ulcer    UTI (urinary tract infection)    Melena    PUD (peptic ulcer disease)    Absolute anemia    Acute blood loss anemia    Acute renal failure (HCC)    Clostridium difficile infection          Consults:  GI     Procedures: EGD     Hospital Course:   Pt with [facility] EGD showed duodenal ulcer healing , new c diff colitis on po vanco   Acute renal failure resolved     Disposition:   Home    Discharged Condition:  Stable     Discharge Medications:       Kain Sauceda   Home Medication Instructions UPO:[id number]    Printed on:08/26/19 0654   Medication Information                      amLODIPine (NORVASC) 5 MG tablet  Take 1 tablet by mouth daily             atorvastatin (LIPITOR) 80 MG tablet  Take 1 tablet by mouth nightly             blood glucose monitor strips  Test 2 times a day & as needed for symptoms of irregular blood glucose.              Calcium Carbonate-Vitamin D (CALCIUM 500/D) 500-125 MG-UNIT TABS  Take 1 tablet by mouth 2 times daily              carvedilol (COREG) 12.5 MG tablet  Take 1 tablet by mouth 2 times daily             citalopram (CELEXA) 10 MG tablet  Take 1 tablet by mouth daily             fenofibrate micronized (LOFIBRA) 134 MG capsule  Take 1 capsule by mouth every morning (before breakfast)             furosemide (LASIX) 40 MG tablet  Take 1 tablet by mouth daily             gabapentin (NEURONTIN) 300 MG

## 2019-08-26 NOTE — CARE COORDINATION
Writer advised by Dr Tamiko Schulz to call in script for Oral Vanco capsule 125mg Q6hrs for 14 days. Writer called in script to [facility] outpatient pharmacy and was advised [facility] has [facility]. The cost is $504.98. Spoke with [facility] and was advised we can voucher medication. They have 3 days worth of medication but will need to have patient or family come back to  rest in 3 days. Spoke to patient and advise of the above anf patient is very grateful and said she will have her  come up to hospital and  the rest of the medication.      Electronically signed by Janine Reyes RN on 8/26/2019 at 3:33 PM

## 2019-08-27 ENCOUNTER — CARE COORDINATION (OUTPATIENT)
Dept: CASE MANAGEMENT | Age: 69
End: 2019-08-27

## 2019-08-27 LAB — SURGICAL PATHOLOGY REPORT: NORMAL

## 2019-08-28 ENCOUNTER — CARE COORDINATION (OUTPATIENT)
Dept: CASE MANAGEMENT | Age: 69
End: 2019-08-28

## 2019-08-28 ENCOUNTER — TELEPHONE (OUTPATIENT)
Dept: INTERNAL MEDICINE CLINIC | Age: 69
End: 2019-08-28

## 2019-08-28 ENCOUNTER — OFFICE VISIT (OUTPATIENT)
Dept: INTERNAL MEDICINE CLINIC | Age: 69
End: 2019-08-28
Payer: MEDICARE

## 2019-08-28 VITALS
HEIGHT: 63 IN | DIASTOLIC BLOOD PRESSURE: 80 MMHG | WEIGHT: 161 LBS | SYSTOLIC BLOOD PRESSURE: 160 MMHG | BODY MASS INDEX: 28.53 KG/M2

## 2019-08-28 DIAGNOSIS — I10 HTN, GOAL BELOW 130/80: ICD-10-CM

## 2019-08-28 DIAGNOSIS — E87.6 HYPOKALEMIA: ICD-10-CM

## 2019-08-28 DIAGNOSIS — N17.9 ACUTE KIDNEY INJURY (HCC): ICD-10-CM

## 2019-08-28 DIAGNOSIS — A04.72 CLOSTRIDIUM DIFFICILE DIARRHEA: ICD-10-CM

## 2019-08-28 DIAGNOSIS — Z09 HOSPITAL DISCHARGE FOLLOW-UP: Primary | ICD-10-CM

## 2019-08-28 DIAGNOSIS — M79.662 PAIN OF LEFT CALF: ICD-10-CM

## 2019-08-28 PROCEDURE — 1111F DSCHRG MED/CURRENT MED MERGE: CPT | Performed by: NURSE PRACTITIONER

## 2019-08-28 PROCEDURE — 99214 OFFICE O/P EST MOD 30 MIN: CPT | Performed by: NURSE PRACTITIONER

## 2019-08-28 NOTE — PROGRESS NOTES
Visit Information    Have you changed or started any medications since your last visit including any over-the-counter medicines, vitamins, or herbal medicines? no   Have you stopped taking any of your medications? Is so, why? -  no  Are you having any side effects from any of your medications? - no    Have you seen any other physician or provider since your last visit? yes - [facility]   Have you had any other diagnostic tests since your last visit? yes - lower duplex   Have you been seen in the emergency room and/or had an admission in a hospital since we last saw you?  yes - [facility]   Have you had your routine dental cleaning in the past 6 months?  no     Do you have an active MyChart account? If no, what is the barrier? Yes    Patient Care Team:  Jewell Gutierrez MD as PCP - Amish Schwartz MD as PCP - [facility]  Zofia Lemmings, RN as Care Transition    Medical History Review  Past Medical, Family, and Social History reviewed and does contribute to the patient presenting condition    Health Maintenance   Topic Date Due    Diabetic foot exam  01/18/1961    Diabetic retinal exam  01/18/1961    DTaP/Tdap/Td vaccine (1 - Tdap) 01/18/1970    Shingles Vaccine (1 of 2) 01/18/2001    Annual Wellness Visit (AWV)  01/18/2014    Breast cancer screen  08/04/2019    Flu vaccine (1) 09/01/2019    Lipid screen  05/20/2020    A1C test (Diabetic or Prediabetic)  06/11/2020    Potassium monitoring  08/26/2020    Creatinine monitoring  08/26/2020    Colon cancer screen colonoscopy  10/07/2026    DEXA (modify frequency per FRAX score)  Completed    Pneumococcal 65+ years Vaccine  Completed    Hepatitis C screen  Completed            Post-Discharge Transitional Care Management Services or Hospital Follow Up      [place] [facility]   YOB: 1950    Date of Office Visit:  8/28/2019  Date of Hospital Admission: 8/21/19  Date of Hospital Discharge: 8/26/19  Readmission Risk Score(high >=14%.  Medium >=10%):Readmission Risk Score: 24      Care management risk score Rising risk (score 2-5) and Complex Care (Scores >=6): 12     Non face to face  following discharge, date last encounter closed (first attempt may have been earlier): 8/27/2019 10:19 AM 8/27/2019 10:19 AM    Call initiated 2 business days of discharge: Yes     Patient Active Problem List   Diagnosis    Other specified disorders of rotator cuff syndrome of shoulder and allied disorders    Diverticulosis of large intestine    Intestinal or peritoneal adhesions with obstruction (postoperative) (postinfection) ([address]    Restless legs syndrome (RLS)    GERD (gastroesophageal reflux disease)    Essential hypertension    Mixed hyperlipidemia    Other abnormal glucose    Atherosclerosis    Lateral epicondylitis    Allergic rhinitis    Vitamin D deficiency    Depression    Degenerative joint disease (DJD) of hip    Edema extremities    Injury of foot, left    Facial droop    CVA (cerebral vascular accident) ([address]    BIN (acute kidney injury) ([address]    Bradycardia    Ataxia    Aphasia    TIA (transient ischemic attack)    Need for prophylactic vaccination and inoculation against cholera alone    Chest pain    Osteopenia    Lumbago    Facet arthritis of lumbar region    Meralgia paresthetica    Lumbar degenerative disc disease    Spondylosis of lumbar region without myelopathy or radiculopathy    Blood poisoning    Cellulitis of left lower extremity    Encounter for medication monitoring    Deep vein thrombosis (DVT) of right lower extremity ([address]    Lumbar facet arthropathy    Shortness of breath    Chronic deep vein thrombosis (DVT) of proximal vein of both lower extremities (HCC)    Chronic diastolic heart failure (HCC)    Neurogenic claudication due to lumbar spinal stenosis    Sacroiliitis (HCC)    Stage 3 chronic kidney disease ([address]    Stage 3 chronic kidney disease ([address]    Type 2 diabetes mellitus with as prescribed         Clostridium difficile diarrhea        Continue course of Vanco         Hypokalemia        Repeat BMP, consider stopping K supplement.     Basic Metabolic Panel [85220 Custom]   - Future Order             Medical Decision Making: moderate complexity

## 2019-08-29 ENCOUNTER — CARE COORDINATION (OUTPATIENT)
Dept: CASE MANAGEMENT | Age: 69
End: 2019-08-29

## 2019-08-29 NOTE — CARE COORDINATION
Other  Other Patient DME:  grab bars  Do you have support at home?:  Partner/Spouse/SO  Do you feel like you have everything you need to keep you well at home?:  Yes  Are you an active caregiver in your home?:  No  Care Transitions Interventions     Other Services:   (Comment: set up vns/[facility])          Follow Up  Future Appointments   Date Time Provider Britni Marshall   9/9/2019  3:00 PM Maury Monroy MD [facility]   9/11/2019  9:40 AM MAIK Katz - CNP [address]   9/20/2019 10:00 AM Beryl Begum MD [address], RN

## 2019-08-30 ASSESSMENT — ENCOUNTER SYMPTOMS
COUGH: 0
WHEEZING: 0
ABDOMINAL PAIN: 0
RHINORRHEA: 0
NAUSEA: 0
VOMITING: 0
TROUBLE SWALLOWING: 0
SHORTNESS OF BREATH: 0
COLOR CHANGE: 0
EYE PAIN: 0
DIARRHEA: 1

## 2019-09-04 ENCOUNTER — TELEPHONE (OUTPATIENT)
Dept: INTERNAL MEDICINE CLINIC | Age: 69
End: 2019-09-04

## 2019-09-04 ENCOUNTER — HOSPITAL ENCOUNTER (EMERGENCY)
Age: 69
Discharge: HOME | End: 2019-09-04
Attending: EMERGENCY MEDICINE
Payer: MEDICARE

## 2019-09-04 ENCOUNTER — HOSPITAL ENCOUNTER (OUTPATIENT)
Age: 69
Setting detail: SPECIMEN
Discharge: HOME | End: 2019-09-04
Payer: MEDICARE

## 2019-09-04 VITALS
OXYGEN SATURATION: 98 % | HEART RATE: 71 BPM | SYSTOLIC BLOOD PRESSURE: 126 MMHG | TEMPERATURE: 98.1 F | DIASTOLIC BLOOD PRESSURE: 42 MMHG | HEIGHT: 63 IN | BODY MASS INDEX: 28.88 KG/M2 | WEIGHT: 163 LBS | RESPIRATION RATE: 16 BRPM

## 2019-09-04 DIAGNOSIS — E55.9 VITAMIN D DEFICIENCY: ICD-10-CM

## 2019-09-04 DIAGNOSIS — N17.9 ACUTE KIDNEY INJURY (HCC): ICD-10-CM

## 2019-09-04 DIAGNOSIS — K92.2 GASTROINTESTINAL HEMORRHAGE, UNSPECIFIED GASTROINTESTINAL HEMORRHAGE TYPE: ICD-10-CM

## 2019-09-04 DIAGNOSIS — E87.6 HYPOKALEMIA: ICD-10-CM

## 2019-09-04 DIAGNOSIS — D64.9 ANEMIA, UNSPECIFIED TYPE: ICD-10-CM

## 2019-09-04 DIAGNOSIS — N17.9 AKI (ACUTE KIDNEY INJURY) (HCC): Primary | ICD-10-CM

## 2019-09-04 LAB
ANION GAP SERPL CALCULATED.3IONS-SCNC: 14 MMOL/L (ref 9–17)
BUN BLDV-MCNC: 40 MG/DL (ref 8–23)
BUN/CREAT BLD: ABNORMAL (ref 9–20)
CALCIUM SERPL-MCNC: 8.9 MG/DL (ref 8.6–10.4)
CHLORIDE BLD-SCNC: 106 MMOL/L (ref 98–107)
CO2: 25 MMOL/L (ref 20–31)
CREAT SERPL-MCNC: 1.59 MG/DL (ref 0.5–0.9)
GFR AFRICAN AMERICAN: 39 ML/MIN
GFR NON-AFRICAN AMERICAN: 32 ML/MIN
GFR SERPL CREATININE-BSD FRML MDRD: ABNORMAL ML/MIN/{1.73_M2}
GFR SERPL CREATININE-BSD FRML MDRD: ABNORMAL ML/MIN/{1.73_M2}
GLUCOSE BLD-MCNC: 113 MG/DL (ref 70–99)
HCT VFR BLD CALC: 31.8 % (ref 36.3–47.1)
HEMOGLOBIN: 9.4 G/DL (ref 11.9–15.1)
MCH RBC QN AUTO: 30.4 PG (ref 25.2–33.5)
MCHC RBC AUTO-ENTMCNC: 29.6 G/DL (ref 28.4–34.8)
MCV RBC AUTO: 102.9 FL (ref 82.6–102.9)
NRBC AUTOMATED: 0 PER 100 WBC
PDW BLD-RTO: 13.5 % (ref 11.8–14.4)
PLATELET # BLD: 325 K/UL (ref 138–453)
PMV BLD AUTO: 10.1 FL (ref 8.1–13.5)
POTASSIUM SERPL-SCNC: 4.8 MMOL/L (ref 3.7–5.3)
RBC # BLD: 3.09 M/UL (ref 3.95–5.11)
SODIUM BLD-SCNC: 145 MMOL/L (ref 135–144)
VITAMIN D 25-HYDROXY: 28.1 NG/ML (ref 30–100)
WBC # BLD: 6.3 K/UL (ref 3.5–11.3)

## 2019-09-04 PROCEDURE — 96360 HYDRATION IV INFUSION INIT: CPT

## 2019-09-04 PROCEDURE — 99283 EMERGENCY DEPT VISIT LOW MDM: CPT

## 2019-09-04 PROCEDURE — 2580000003 HC RX 258: Performed by: STUDENT IN AN ORGANIZED HEALTH CARE EDUCATION/TRAINING PROGRAM

## 2019-09-04 RX ORDER — SODIUM CHLORIDE, SODIUM LACTATE, POTASSIUM CHLORIDE, AND CALCIUM CHLORIDE .6; .31; .03; .02 G/100ML; G/100ML; G/100ML; G/100ML
1000 INJECTION, SOLUTION INTRAVENOUS ONCE
Status: COMPLETED | OUTPATIENT
Start: 2019-09-04 | End: 2019-09-04

## 2019-09-04 RX ADMIN — SODIUM CHLORIDE, POTASSIUM CHLORIDE, SODIUM LACTATE AND CALCIUM CHLORIDE 1000 ML: 600; 310; 30; 20 INJECTION, SOLUTION INTRAVENOUS at 17:20

## 2019-09-04 ASSESSMENT — ENCOUNTER SYMPTOMS
ABDOMINAL PAIN: 0
SHORTNESS OF BREATH: 0
VOMITING: 0
BACK PAIN: 0
SORE THROAT: 0
COUGH: 0
BLOOD IN STOOL: 0
NAUSEA: 0
DIARRHEA: 1

## 2019-09-04 NOTE — ED PROVIDER NOTES
(); Cardiac catheterization (); Bunionectomy (Left); sinus surgery (); Colonoscopy; other surgical history (14); cyst removal (Right); Wrist fracture surgery (Left, 3/5/2019); Upper gastrointestinal endoscopy (N/A, 2019); Upper gastrointestinal endoscopy (N/A, 2019); and Upper gastrointestinal endoscopy (N/A, 2019).     Social History     Socioeconomic History    Marital status:      Spouse name: Not on file    Number of children: Not on file    Years of education: Not on file    Highest education level: Not on file   Occupational History    Occupation: retired   Social Needs    Financial resource strain: Not on file    Food insecurity:     Worry: Not on file     Inability: Not on file   Transmit needs:     Medical: Not on file     Non-medical: Not on file   Tobacco Use    Smoking status: Former Smoker     Packs/day: 0.50     Years: 20.00     Pack years: 10.00     Types: Cigarettes     Start date: 1995     Last attempt to quit: 2017     Years since quittin.2    Smokeless tobacco: Never Used    Tobacco comment: 18 quit 10 days ago   Substance and Sexual Activity    Alcohol use: No     Alcohol/week: 0.0 standard drinks     Comment: occasionally    Drug use: No    Sexual activity: Not on file   Lifestyle    Physical activity:     Days per week: Not on file     Minutes per session: Not on file    Stress: Not on file   Relationships    Social connections:     Talks on phone: Not on file     Gets together: Not on file     Attends Jew service: Not on file     Active member of club or organization: Not on file     Attends meetings of clubs or organizations: Not on file     Relationship status: Not on file    Intimate partner violence:     Fear of current or ex partner: Not on file     Emotionally abused: Not on file     Physically abused: Not on file     Forced sexual activity: Not on file   Other Topics Concern    Not on file   Social 6/27/19   Chiara Green MD   Lancets MISC 1 each by Does not apply route daily 6/27/19   Chiara Green MD   citalopram (CELEXA) 10 MG tablet Take 1 tablet by mouth daily 6/27/19   Chiara Green MD   glucose monitoring kit (FREESTYLE) monitoring kit 1 kit by Does not apply route daily 6/11/19   Naren Clear, APRN - CNP   SITagliptin (JANUVIA) 100 MG tablet Take 1 tablet by mouth daily 5/24/19   Branden Schwartz MD   lidocaine (XYLOCAINE) 5 % ointment 4-5 times a day to your right thigh for pain. 3/15/19   Stephanie Ellis MD   atorvastatin (LIPITOR) 80 MG tablet Take 1 tablet by mouth nightly 2/8/19   Chiara Green MD   nitroGLYCERIN (NITROSTAT) 0.4 MG SL tablet Place 1 tablet under the tongue every 5 minutes as needed for Chest pain 1/8/18   Chiara Green MD   Calcium Carbonate-Vitamin D (CALCIUM 500/D) 500-125 MG-UNIT TABS Take 1 tablet by mouth 2 times daily     Historical Provider, MD       REVIEW OF SYSTEMS    (2-9 systems for level 4, 10 ormore for level 5)      Review of Systems   Constitutional: Negative for chills and fever. HENT: Negative for sore throat. Eyes: Negative for visual disturbance. Respiratory: Negative for cough and shortness of breath. Cardiovascular: Positive for leg swelling (left leg, chronic). Negative for chest pain. Gastrointestinal: Positive for diarrhea (loose stool 4-5x/day, improving since onset). Negative for abdominal pain, blood in stool, nausea and vomiting. Genitourinary: Negative for dysuria and hematuria. Musculoskeletal: Negative for back pain and neck pain. Skin: Negative for rash. Neurological: Negative for dizziness, syncope, light-headedness, numbness and headaches. Hematological: Does not bruise/bleed easily.        PHYSICAL EXAM   (up to 7 for level 4, 8 or more for level 5)      INITIAL VITALS:   BP (!) 126/42   Pulse 71   Temp 98.1 °F (36.7 °C) (Oral)   Resp 16   Ht 5' 3\" (1.6 m)   Wt 163 lb (73.9 kg)   SpO2 98%   BMI 28.87 kg/m²

## 2019-09-04 NOTE — FLOWSHEET NOTE
09/04/19 1804   Encounter Summary   Services provided to: Patient   Referral/Consult From: Rounding   Support System Spouse; Family members; Children   Continue Visiting   (9-4-19)   Complexity of Encounter Low   Length of Encounter 15 minutes   Routine   Type Initial   Assessment Approachable   Intervention Active listening;Explored feelings, thoughts, concerns;[place];Sustaining presence/ Ministry of presence; Discussed illness/injury and it's impact   Outcome Expressed gratitude;Engaged in conversation;Coping

## 2019-09-05 ENCOUNTER — CARE COORDINATION (OUTPATIENT)
Dept: CASE MANAGEMENT | Age: 69
End: 2019-09-05

## 2019-09-05 ENCOUNTER — TELEPHONE (OUTPATIENT)
Dept: INTERNAL MEDICINE CLINIC | Age: 69
End: 2019-09-05

## 2019-09-06 ENCOUNTER — CARE COORDINATION (OUTPATIENT)
Dept: CASE MANAGEMENT | Age: 69
End: 2019-09-06

## 2019-09-06 NOTE — CARE COORDINATION
[facility] Transitions Follow Up Call    2019    Patient: Edwar Thomas  Patient : 1950   MRN: [id number]  Reason for Admission: BIN  Discharge Date: 19 RARS: Readmission Risk Score: 24         Spoke with: Abhijit Day  Final call- informed patient of final call, patient v/u confirmed she had writer's contact information, Patient has vns through [facility] also [facility] telehealth nurse was out to see her today, left her a scale , blood pressure cuff and pulse ox, will be monitoring her daily, writer spoke to [employer]  from , he is going to call pcp's office to see about an order for a dietician and also see what can be done to help patient with the seeping from her legs, patient c/ of the blisters breaking and it causing itching, will forward patient on to [facility] manager for assessment for CC., care transitions completed//JU    Care Transitions Subsequent and Final Call    Subsequent and Final Calls  Do you currently have any active services?:  Yes  Are you currently active with any services?:  Home Health  Care Transitions Interventions     Other Services:   (Comment: set up vns/[facility])   Other Interventions:             Follow Up  Future Appointments   Date Time Provider Britni Marshall   2019  3:00 PM Nancy Cali MD [facility]   2019  9:40 AM MIAK Figueroa - CNP [address]   2019 10:00 AM Richy Gregg MD [facility] Mel Eli RN

## 2019-09-09 ENCOUNTER — OFFICE VISIT (OUTPATIENT)
Dept: GASTROENTEROLOGY | Age: 69
End: 2019-09-09
Payer: MEDICARE

## 2019-09-09 VITALS
SYSTOLIC BLOOD PRESSURE: 130 MMHG | WEIGHT: 164.7 LBS | DIASTOLIC BLOOD PRESSURE: 67 MMHG | BODY MASS INDEX: 29.18 KG/M2 | HEART RATE: 82 BPM

## 2019-09-09 DIAGNOSIS — K27.9 PUD (PEPTIC ULCER DISEASE): ICD-10-CM

## 2019-09-09 DIAGNOSIS — D50.8 OTHER IRON DEFICIENCY ANEMIA: ICD-10-CM

## 2019-09-09 DIAGNOSIS — K57.91 GASTROINTESTINAL HEMORRHAGE ASSOCIATED WITH INTESTINAL DIVERTICULOSIS: Primary | ICD-10-CM

## 2019-09-09 PROCEDURE — G8427 DOCREV CUR MEDS BY ELIG CLIN: HCPCS | Performed by: INTERNAL MEDICINE

## 2019-09-09 PROCEDURE — 1036F TOBACCO NON-USER: CPT | Performed by: INTERNAL MEDICINE

## 2019-09-09 PROCEDURE — G8598 ASA/ANTIPLAT THER USED: HCPCS | Performed by: INTERNAL MEDICINE

## 2019-09-09 PROCEDURE — 4040F PNEUMOC VAC/ADMIN/RCVD: CPT | Performed by: INTERNAL MEDICINE

## 2019-09-09 PROCEDURE — 1090F PRES/ABSN URINE INCON ASSESS: CPT | Performed by: INTERNAL MEDICINE

## 2019-09-09 PROCEDURE — 1111F DSCHRG MED/CURRENT MED MERGE: CPT | Performed by: INTERNAL MEDICINE

## 2019-09-09 PROCEDURE — G8399 PT W/DXA RESULTS DOCUMENT: HCPCS | Performed by: INTERNAL MEDICINE

## 2019-09-09 PROCEDURE — 3017F COLORECTAL CA SCREEN DOC REV: CPT | Performed by: INTERNAL MEDICINE

## 2019-09-09 PROCEDURE — 1123F ACP DISCUSS/DSCN MKR DOCD: CPT | Performed by: INTERNAL MEDICINE

## 2019-09-09 PROCEDURE — 99214 OFFICE O/P EST MOD 30 MIN: CPT | Performed by: INTERNAL MEDICINE

## 2019-09-09 PROCEDURE — G8417 CALC BMI ABV UP PARAM F/U: HCPCS | Performed by: INTERNAL MEDICINE

## 2019-09-09 ASSESSMENT — ENCOUNTER SYMPTOMS
ABDOMINAL PAIN: 0
ANAL BLEEDING: 0
ABDOMINAL DISTENTION: 0
SINUS PRESSURE: 0
TROUBLE SWALLOWING: 0
DIARRHEA: 0
RESPIRATORY NEGATIVE: 1
EYES NEGATIVE: 1
CONSTIPATION: 0
RECTAL PAIN: 0
ALLERGIC/IMMUNOLOGIC NEGATIVE: 1
VOMITING: 0
SORE THROAT: 0
COUGH: 0
WHEEZING: 0
GASTROINTESTINAL NEGATIVE: 1
BLOOD IN STOOL: 0
BACK PAIN: 1
CHOKING: 0
NAUSEA: 0
VOICE CHANGE: 0

## 2019-09-09 NOTE — PROGRESS NOTES
([facility] [address]    Bradycardia    Ataxia    Aphasia    TIA (transient ischemic attack)    Need for prophylactic vaccination and inoculation against cholera alone    Chest pain    Osteopenia    Lumbago    Facet arthritis of lumbar region    Meralgia paresthetica    Lumbar degenerative disc disease    Spondylosis of lumbar region without myelopathy or radiculopathy    Blood poisoning    Cellulitis of left lower extremity    Encounter for medication monitoring    Deep vein thrombosis (DVT) of right lower extremity (HCC)    Lumbar facet arthropathy    Shortness of breath    Chronic deep vein thrombosis (DVT) of proximal vein of both lower extremities (HCC)    Chronic diastolic heart failure (HCC)    Neurogenic claudication due to lumbar spinal stenosis    Sacroiliitis (HCC)    Stage 3 chronic kidney disease (HCC)    Stage 3 chronic kidney disease (HCC)    Type 2 diabetes mellitus with circulatory disorder, without long-term current use of insulin (HCC)    Chronic deep vein thrombosis (DVT) of popliteal vein of right lower extremity (HCC)    Closed fracture of left wrist    B12 deficiency    Iron deficiency anemia secondary to inadequate dietary iron intake    Diarrhea due to malabsorption    Closed head injury    Scalp laceration    Abnormal finding on imaging    Other irritable bowel syndrome    Frequent falls    Double vision    Muscle soreness    GI bleed    Peptic ulcer    UTI (urinary tract infection)    Melena    PUD (peptic ulcer disease)    Absolute anemia    Acute blood loss anemia    Acute renal failure (HCC)    Clostridium difficile infection           Plan:      Change PPI to Q daily    Avoid NSAIDs    Finish course of PO  Vanco    Pt seems to have signs and symptoms consistent with GERD, acid indigestion and heartburns. She was discussed  in detail about some possible life style and dietary modifications.  She was stressed about the maintenance  of appropriate weight and

## 2019-09-11 ENCOUNTER — HOSPITAL ENCOUNTER (OUTPATIENT)
Dept: PAIN MANAGEMENT | Age: 69
Discharge: HOME | End: 2019-09-11
Payer: MEDICARE

## 2019-09-11 VITALS
HEIGHT: 63 IN | TEMPERATURE: 98.3 F | RESPIRATION RATE: 16 BRPM | OXYGEN SATURATION: 97 % | SYSTOLIC BLOOD PRESSURE: 134 MMHG | DIASTOLIC BLOOD PRESSURE: 51 MMHG | HEART RATE: 78 BPM | WEIGHT: 160 LBS | BODY MASS INDEX: 28.35 KG/M2

## 2019-09-11 DIAGNOSIS — M51.36 LUMBAR DEGENERATIVE DISC DISEASE: ICD-10-CM

## 2019-09-11 DIAGNOSIS — M46.1 SACROILIITIS (HCC): ICD-10-CM

## 2019-09-11 DIAGNOSIS — Z51.81 ENCOUNTER FOR MEDICATION MONITORING: ICD-10-CM

## 2019-09-11 DIAGNOSIS — G57.11 MERALGIA PARESTHETICA OF RIGHT SIDE: Primary | ICD-10-CM

## 2019-09-11 DIAGNOSIS — M47.816 LUMBAR FACET ARTHROPATHY: ICD-10-CM

## 2019-09-11 DIAGNOSIS — M47.816 FACET ARTHRITIS OF LUMBAR REGION: ICD-10-CM

## 2019-09-11 PROCEDURE — 99213 OFFICE O/P EST LOW 20 MIN: CPT | Performed by: NURSE PRACTITIONER

## 2019-09-11 PROCEDURE — 99214 OFFICE O/P EST MOD 30 MIN: CPT

## 2019-09-11 ASSESSMENT — ENCOUNTER SYMPTOMS
BACK PAIN: 1
CONSTIPATION: 1
RESPIRATORY NEGATIVE: 1
EYES NEGATIVE: 1
ABDOMINAL PAIN: 1

## 2019-09-11 NOTE — PROGRESS NOTES
discussed., No signs of potential drug abuse or diversion identified. , Assessed functional status., Obtaining appropriate analgesic effect of treatment. Cyn Jasbir, APRN - CNP)  Review ofOARRS does not show any aberrant prescription behavior. Medication is helping the patient stay active. Patient denies any side effects and reports adequate analgesia. No sign of misuse/abuse.           As above, I did review the imaging        When was thelast UDS:    5-14-19         Was the UDS appropriate:yes        Record/Diagnostics Review:       As above, I did review the imaging     5/17/2019 12:40 PM - Luciano, Melbapn Incoming Lab Results From [facility]      Component Value Ref Range & Units Status Collected Lab   Pain Management Drug Panel Interp, Urine Consistent    Final 05/14/2019  9:00 AM [facility]   (NOTE)   ________________________________________________________________   DRUGS EXPECTED:   HYDROCODONE [5/12/19]   ________________________________________________________________   CONSISTENT with medications provided:   HYDROCODONE : based on the absence of hydrocodone and metabolites   ________________________________________________________________   INTERPRETIVE INFORMATION: Pain Mgt [place], [place] Spec/EMIT, Ur,                            Interp   Interpretation depends on accuracy and completeness of patient   medication information submitted by client.     6-Acetylmorphine, Ur Not Detected    Final 05/14/2019  9:00 AM [facility]   7-Aminoclonazepam, Urine Not Detected    Final 05/14/2019  9:00 AM [facility]   Alpha-OH-Alpraz, Urine Not Detected    Final 05/14/2019  9:00 AM [facility]   Alprazolam, Urine Not Detected    Final 05/14/2019  9:00 AM [facility]   Amphetamines, urine Not Detected    Final 05/14/2019  9:00 AM [facility]   Barbiturates, Ur Not Detected    Final 05/14/2019  9:00 AM [facility]   Benzoylecgonine, Ur Not Detected    Final 05/14/2019  9:00 AM [facility]   Buprenorphine Urine Not Detected    Final 05/14/2019  9:00 AM [facility]   Carisoprodol, Ur Not Detected    Final 05/14/2019  9:00 AM [facility]   (NOTE)   The carisoprodol immunoassay has cross-reactivity to carisoprodol   and meprobamate.     Clonazepam, Urine Not Detected    Final 05/14/2019  9:00 AM [facility]   Codeine, Urine Not Detected    Final 05/14/2019  9:00 AM [facility]   MDA, Ur Not Detected    Final 05/14/2019  9:00 AM [facility]   Diazepam, Urine Not Detected    Final 05/14/2019  9:00 AM [facility]   Ethyl Glucuronide Ur Not Detected    Final 05/14/2019  9:00 AM [facility]   Fentanyl, Ur Not Detected    Final 05/14/2019  9:00 AM [facility]   Hydrocodone, Urine Not Detected    Final 05/14/2019  9:00 AM [facility]   Hydromorphone, Urine Not Detected    Final 05/14/2019  9:00 AM [facility]   Lorazepam, Urine Not Detected    Final 05/14/2019  9:00 AM [facility]   Marijuana Metab, Ur Not Detected    Final 05/14/2019  9:00 AM [facility]   MDEA, RADHA, Ur Not Detected    Final 05/14/2019  9:00 AM [facility]   MDMA, Urine Not Detected    Final 05/14/2019  9:00 AM [facility]   Meperidine Metab, Ur Not Detected    Final 05/14/2019  9:00 AM [facility]   Methadone, Urine Not Detected    Final 05/14/2019  9:00 AM [facility]   Methamphetamine, Urine Not Detected    Final 05/14/2019  9:00 AM [facility]   Methylphenidate Not Detected    Final 05/14/2019  9:00 AM [facility]   Midazolam, Urine Not Detected    Final 05/14/2019  9:00 AM [facility]   Morphine Urine Not Detected    Final 05/14/2019  9:00 AM [facility]   Norbuprenorphine, Urine Not Detected    Final 05/14/2019  9:00 AM [facility]   Nordiazepam, Urine Not Detected    Final 05/14/2019  9:00 AM [facility]   Norfentanyl, Urine Not Detected    Final 05/14/2019  9:00 AM [facility]   NORHYDROCODONE, URINE Not Detected    Final 05/14/2019  9:00 AM [facility]   Noroxycodone, Urine Not Detected    Final 05/14/2019  9:00 AM [facility]   NOROXYMORPHONE, URINE Not Detected    Final 05/14/2019  9:00 AM [facility]   Oxazepam, Urine Not Detected    Final 05/14/2019  9:00 AM [facility]   Oxycodone Urine Not Detected    Final 05/14/2019  9:00 AM [facility]   Oxymorphone, Urine Not Detected    Final 05/14/2019  9:00 AM [facility]   PCP, characteristics   determined by Yusuf Corona. The U.S. Food and Drug   Administration has not approved or cleared this test; however, FDA   clearance or approval is not currently required for clinical use. The results are not intended to be used as the sole means for   clinical diagnosis or patient management decisions. EER Hi Res Interp Ur See Note    Final 05/14/2019  9:00 AM [facility]   (NOTE)   Access [facility] Enhanced Report using either link below:   -Direct access: [email or web address]   -Enter Username, Password: [email or web address]   Username: [email or web address]   Password: [email or web address] Performed by Yusuf Corona,   [facility] [id number] [id number]   www. Alicja Fitzgerald MD, Lab.  Director           Past Medical History:   Diagnosis Date    Allergic rhinitis, cause unspecified     Back pain     lumbar    Bowel obstruction (HCC)     history of due to scar tissue, resolved non-surgically    CAD (coronary artery disease)     Cellulitis     left leg    Cellulitis 2017 August    leg left leg/bug bite    Diverticulosis of colon (without mention of hemorrhage)     GERD (gastroesophageal reflux disease)     History of MI (myocardial infarction) 2005    thought due to a blood clot    History of ovarian cyst 1970    had oopherectomy    History of peritonitis 1968    due to ruptured appendix age 13    HTN (hypertension)     Hx of blood clots     right leg    Hyperlipidemia     Intestinal or peritoneal adhesions with obstruction (postoperative) (postinfection) ([address]     Kidney infection     renal failure/sepsis/spider bite    Lateral epicondylitis  of elbow     Muscle strain     right posterior shoulder    Other abnormal glucose     Restless legs syndrome (RLS)     Vitamin D deficiency     Wears glasses        Past Surgical History:   Procedure Laterality Date    ABDOMEN SURGERY  1976    benign tumor removed near remaining overy, 1.5 pounds    balance and numbness. Physical Exam:  BP (!) 134/51   Pulse 78   Temp 98.3 °F (36.8 °C) (Oral)   Resp 16   Ht 5' 3\" (1.6 m)   Wt 160 lb (72.6 kg)   SpO2 97%   BMI 28.34 kg/m²     Physical Exam   Constitutional: She appears well-developed. HENT:   Head: Normocephalic. Neck: Normal range of motion. Pulmonary/Chest: Effort normal.   Musculoskeletal:        Lumbar back: She exhibits decreased range of motion and tenderness. Neurological: She is alert. Gait abnormal.   Reflex Scores:       Patellar reflexes are 1+ on the right side and 1+ on the left side. Achilles reflexes are 1+ on the right side and 1+ on the left side. Skin: Skin is warm, dry and intact. Psychiatric: She has a normal mood and affect.  Her behavior is normal. Judgment and thought content normal. Cognition and memory are normal.           Dexa Scan :  EXAMINATION:   BONE DENSITOMETRY       7/9/2019 10:21 am       TECHNIQUE:   A bone density dual x-ray absorptiometry (DEXA) scan was performed of the   lumbar spine and left hip  on a [employer] system.       COMPARISON:   None.       HISTORY:   ORDERING SYSTEM PROVIDED HISTORY: Abnormal findings on diagnostic imaging of   limbs       FINDINGS:   LUMBAR SPINE:       The bone mineral density in the lumbar spine including the L1-L4 levels is   measured at 1.144 g/cm2, which corresponds to a T-score of -0.4 and a Z-score   of 0.8.  This is within the normal range by WHO criteria.       LEFT HIP:       The bone mineral density in the total hip is measured at 0.869 g/cm2   corresponding to a T-score of -1.1 and a Z-score of -0.1.  This is within the   osteopenia range by WHO criteria.       The bone mineral density of the femoral neck is measured at 0.714 g/cm2   corresponding to a T-score of -2.3 and a Z-score of -1.0.  This is within the   osteopenia range by WHO criteria.       WHO fracture risk assessment tool (FRAX) projects a 10-year fracture risk of   a major

## 2019-09-15 ENCOUNTER — HOSPITAL ENCOUNTER (EMERGENCY)
Age: 69
Discharge: HOME | End: 2019-09-15
Attending: EMERGENCY MEDICINE
Payer: MEDICARE

## 2019-09-15 VITALS
WEIGHT: 161 LBS | TEMPERATURE: 98.6 F | DIASTOLIC BLOOD PRESSURE: 39 MMHG | SYSTOLIC BLOOD PRESSURE: 100 MMHG | BODY MASS INDEX: 28.53 KG/M2 | HEIGHT: 63 IN | HEART RATE: 73 BPM | OXYGEN SATURATION: 97 % | RESPIRATION RATE: 14 BRPM

## 2019-09-15 DIAGNOSIS — M79.89 LEG SWELLING: Primary | ICD-10-CM

## 2019-09-15 DIAGNOSIS — L03.90 CELLULITIS, UNSPECIFIED CELLULITIS SITE: ICD-10-CM

## 2019-09-15 PROCEDURE — 6370000000 HC RX 637 (ALT 250 FOR IP): Performed by: STUDENT IN AN ORGANIZED HEALTH CARE EDUCATION/TRAINING PROGRAM

## 2019-09-15 PROCEDURE — 93970 EXTREMITY STUDY: CPT

## 2019-09-15 PROCEDURE — 99283 EMERGENCY DEPT VISIT LOW MDM: CPT

## 2019-09-15 RX ORDER — ACETAMINOPHEN 500 MG
1000 TABLET ORAL ONCE
Status: DISCONTINUED | OUTPATIENT
Start: 2019-09-15 | End: 2019-09-15 | Stop reason: HOSPADM

## 2019-09-15 RX ORDER — DOXYCYCLINE 100 MG/1
100 TABLET ORAL 2 TIMES DAILY
Qty: 20 TABLET | Refills: 0 | Status: SHIPPED | OUTPATIENT
Start: 2019-09-15 | End: 2019-09-25

## 2019-09-15 RX ORDER — DOXYCYCLINE 100 MG/1
100 CAPSULE ORAL ONCE
Status: COMPLETED | OUTPATIENT
Start: 2019-09-15 | End: 2019-09-15

## 2019-09-15 RX ADMIN — DOXYCYCLINE 100 MG: 100 CAPSULE ORAL at 17:52

## 2019-09-15 NOTE — ED PROVIDER NOTES
elbow, Muscle strain, Other abnormal glucose, Restless legs syndrome (RLS), Vitamin D deficiency, and Wears glasses. has a past surgical history that includes Cardiac catheterization; Ovary removal (); colectomy (); Appendectomy (); Ovary removal (); Hysterectomy (); Abdomen surgery (); Cardiac catheterization (); Bunionectomy (Left); sinus surgery (); Colonoscopy; other surgical history (14); cyst removal (Right); Wrist fracture surgery (Left, 3/5/2019); Upper gastrointestinal endoscopy (N/A, 2019); Upper gastrointestinal endoscopy (N/A, 2019); and Upper gastrointestinal endoscopy (N/A, 2019).     Social History     Socioeconomic History    Marital status:      Spouse name: Not on file    Number of children: Not on file    Years of education: Not on file    Highest education level: Not on file   Occupational History    Occupation: retired   Social Needs    Financial resource strain: Not on file    Food insecurity:     Worry: Not on file     Inability: Not on file   Masala needs:     Medical: Not on file     Non-medical: Not on file   Tobacco Use    Smoking status: Former Smoker     Packs/day: 0.50     Years: 20.00     Pack years: 10.00     Types: Cigarettes     Start date: 1995     Last attempt to quit: 2017     Years since quittin.2    Smokeless tobacco: Never Used    Tobacco comment: 18 quit 10 days ago   Substance and Sexual Activity    Alcohol use: No     Alcohol/week: 0.0 standard drinks     Comment: occasionally    Drug use: No    Sexual activity: Not on file   Lifestyle    Physical activity:     Days per week: Not on file     Minutes per session: Not on file    Stress: Not on file   Relationships    Social connections:     Talks on phone: Not on file     Gets together: Not on file     Attends [employer] service: Not on file     Active member of club or organization: Not on file     Attends meetings of with left lower extremity pain acute on chronic exacerbation. Significant erythema and edema compared to right lower extremity. Nonpitting. Concern for DVT. Low suspicion for exacerbation of heart failure or CKD as I would expect bilateral pitting edema is not the case here. Patient did speak to her primary care doctor today that she go to the emergency department for work-up. We will pursue Doppler of lower extremity Tylenol for pain control. Doppler Negative for DVT. Will treat for cellulitis with doxycycline as patient was recently treated for C dif. Recommend continued follow up with PCP. First dose of doxy given here as most pharmacies are closed by this time. Discharge      EMERGENCY DEPARTMENT COURSE:               DIAGNOSTIC RESULTS / EMERGENCYDEPARTMENT COURSE   LABS:  Labs Reviewed - No data to display    RADIOLOGY:  No results found. PROCEDURES:      CONSULTS:  None    CRITICAL CARE:  Please see attending note    FINAL IMPRESSION     1. Leg swelling    2. Cellulitis, unspecified cellulitis site          DISPOSITION / PLAN   DISPOSITION Decision To Discharge 09/15/2019 05:06:41 PM       Evaluation and treatment course in the ED, and plan of care upon discharge was discussed in length with the patient. Patient had no further questions prior to being discharged and was instructed to return to the ED for new or worsening symptoms. Any changes to existing medications or new prescriptions were reviewed with patient and they expressed understanding of how to correctly take their medications and the possible side effects. The patient understands that at this time there is no evidence for a more malignant underlying process, but the patient also understands that early in the process of an illness or injury, an emergency department workup can be falsely reassuring.   Routine discharge counseling was given, and the patient understands that worsening, changing or persistent symptoms should prompt an

## 2019-09-16 ENCOUNTER — TELEPHONE (OUTPATIENT)
Dept: INTERNAL MEDICINE CLINIC | Age: 69
End: 2019-09-16

## 2019-09-17 ENCOUNTER — OFFICE VISIT (OUTPATIENT)
Dept: ORTHOPEDIC SURGERY | Age: 69
End: 2019-09-17
Payer: MEDICARE

## 2019-09-17 DIAGNOSIS — M54.42 CHRONIC LEFT-SIDED LOW BACK PAIN WITH LEFT-SIDED SCIATICA: ICD-10-CM

## 2019-09-17 DIAGNOSIS — I82.4Y2 ACUTE DEEP VEIN THROMBOSIS (DVT) OF PROXIMAL VEIN OF LEFT LOWER EXTREMITY (HCC): ICD-10-CM

## 2019-09-17 DIAGNOSIS — M43.10 ACQUIRED SPONDYLOLISTHESIS: Primary | ICD-10-CM

## 2019-09-17 DIAGNOSIS — G89.29 CHRONIC LEFT-SIDED LOW BACK PAIN WITH LEFT-SIDED SCIATICA: ICD-10-CM

## 2019-09-17 DIAGNOSIS — R60.9 PERIPHERAL EDEMA: ICD-10-CM

## 2019-09-17 DIAGNOSIS — M48.062 SPINAL STENOSIS OF LUMBAR REGION WITH NEUROGENIC CLAUDICATION: ICD-10-CM

## 2019-09-17 PROCEDURE — 1123F ACP DISCUSS/DSCN MKR DOCD: CPT | Performed by: ORTHOPAEDIC SURGERY

## 2019-09-17 PROCEDURE — 1036F TOBACCO NON-USER: CPT | Performed by: ORTHOPAEDIC SURGERY

## 2019-09-17 PROCEDURE — G8598 ASA/ANTIPLAT THER USED: HCPCS | Performed by: ORTHOPAEDIC SURGERY

## 2019-09-17 PROCEDURE — G8427 DOCREV CUR MEDS BY ELIG CLIN: HCPCS | Performed by: ORTHOPAEDIC SURGERY

## 2019-09-17 PROCEDURE — G8399 PT W/DXA RESULTS DOCUMENT: HCPCS | Performed by: ORTHOPAEDIC SURGERY

## 2019-09-17 PROCEDURE — 1090F PRES/ABSN URINE INCON ASSESS: CPT | Performed by: ORTHOPAEDIC SURGERY

## 2019-09-17 PROCEDURE — 1111F DSCHRG MED/CURRENT MED MERGE: CPT | Performed by: ORTHOPAEDIC SURGERY

## 2019-09-17 PROCEDURE — 3017F COLORECTAL CA SCREEN DOC REV: CPT | Performed by: ORTHOPAEDIC SURGERY

## 2019-09-17 PROCEDURE — 4040F PNEUMOC VAC/ADMIN/RCVD: CPT | Performed by: ORTHOPAEDIC SURGERY

## 2019-09-17 PROCEDURE — G8417 CALC BMI ABV UP PARAM F/U: HCPCS | Performed by: ORTHOPAEDIC SURGERY

## 2019-09-17 PROCEDURE — 99214 OFFICE O/P EST MOD 30 MIN: CPT | Performed by: ORTHOPAEDIC SURGERY

## 2019-09-17 ASSESSMENT — ENCOUNTER SYMPTOMS: BACK PAIN: 1

## 2019-09-18 ENCOUNTER — TELEPHONE (OUTPATIENT)
Dept: INTERVENTIONAL RADIOLOGY/VASCULAR | Age: 69
End: 2019-09-18

## 2019-09-19 DIAGNOSIS — R60.9 PERIPHERAL EDEMA: Primary | ICD-10-CM

## 2019-09-21 PROBLEM — N39.0 UTI (URINARY TRACT INFECTION): Status: RESOLVED | Noted: 2019-08-22 | Resolved: 2019-09-21

## 2019-09-26 ENCOUNTER — TELEPHONE (OUTPATIENT)
Dept: ORTHOPEDIC SURGERY | Age: 69
End: 2019-09-26

## 2019-09-26 ENCOUNTER — OFFICE VISIT (OUTPATIENT)
Dept: INTERNAL MEDICINE CLINIC | Age: 69
End: 2019-09-26
Payer: MEDICARE

## 2019-09-26 VITALS
BODY MASS INDEX: 29.41 KG/M2 | DIASTOLIC BLOOD PRESSURE: 62 MMHG | OXYGEN SATURATION: 98 % | WEIGHT: 166 LBS | SYSTOLIC BLOOD PRESSURE: 90 MMHG | HEIGHT: 63 IN | HEART RATE: 62 BPM

## 2019-09-26 DIAGNOSIS — N18.30 STAGE 3 CHRONIC KIDNEY DISEASE (HCC): ICD-10-CM

## 2019-09-26 DIAGNOSIS — I10 ESSENTIAL HYPERTENSION: ICD-10-CM

## 2019-09-26 DIAGNOSIS — M48.062 SPINAL STENOSIS OF LUMBAR REGION WITH NEUROGENIC CLAUDICATION: Primary | ICD-10-CM

## 2019-09-26 DIAGNOSIS — I82.5Y3 CHRONIC DEEP VEIN THROMBOSIS (DVT) OF PROXIMAL VEIN OF BOTH LOWER EXTREMITIES (HCC): ICD-10-CM

## 2019-09-26 DIAGNOSIS — I95.9 HYPOTENSION, UNSPECIFIED HYPOTENSION TYPE: ICD-10-CM

## 2019-09-26 PROCEDURE — 4040F PNEUMOC VAC/ADMIN/RCVD: CPT | Performed by: INTERNAL MEDICINE

## 2019-09-26 PROCEDURE — 1090F PRES/ABSN URINE INCON ASSESS: CPT | Performed by: INTERNAL MEDICINE

## 2019-09-26 PROCEDURE — G8399 PT W/DXA RESULTS DOCUMENT: HCPCS | Performed by: INTERNAL MEDICINE

## 2019-09-26 PROCEDURE — G8417 CALC BMI ABV UP PARAM F/U: HCPCS | Performed by: INTERNAL MEDICINE

## 2019-09-26 PROCEDURE — G8427 DOCREV CUR MEDS BY ELIG CLIN: HCPCS | Performed by: INTERNAL MEDICINE

## 2019-09-26 PROCEDURE — 3017F COLORECTAL CA SCREEN DOC REV: CPT | Performed by: INTERNAL MEDICINE

## 2019-09-26 PROCEDURE — 99214 OFFICE O/P EST MOD 30 MIN: CPT | Performed by: INTERNAL MEDICINE

## 2019-09-26 PROCEDURE — G8598 ASA/ANTIPLAT THER USED: HCPCS | Performed by: INTERNAL MEDICINE

## 2019-09-26 PROCEDURE — 1123F ACP DISCUSS/DSCN MKR DOCD: CPT | Performed by: INTERNAL MEDICINE

## 2019-09-26 PROCEDURE — 1036F TOBACCO NON-USER: CPT | Performed by: INTERNAL MEDICINE

## 2019-09-26 RX ORDER — ATORVASTATIN CALCIUM 80 MG/1
40 TABLET, FILM COATED ORAL NIGHTLY
Qty: 90 TABLET | Refills: 3 | Status: SHIPPED | OUTPATIENT
Start: 2019-09-26 | End: 2019-11-11 | Stop reason: SDUPTHER

## 2019-09-26 RX ORDER — CYANOCOBALAMIN 1000 UG/ML
1000 INJECTION INTRAMUSCULAR; SUBCUTANEOUS ONCE
Status: DISCONTINUED | OUTPATIENT
Start: 2019-09-26 | End: 2019-11-06 | Stop reason: ALTCHOICE

## 2019-09-26 RX ORDER — HYDRALAZINE HYDROCHLORIDE 50 MG/1
50 TABLET, FILM COATED ORAL 3 TIMES DAILY
Qty: 90 TABLET | Refills: 3 | Status: SHIPPED | OUTPATIENT
Start: 2019-09-26 | End: 2019-12-31 | Stop reason: SDUPTHER

## 2019-09-26 ASSESSMENT — ENCOUNTER SYMPTOMS
NAUSEA: 0
CONSTIPATION: 0
ABDOMINAL DISTENTION: 0
TROUBLE SWALLOWING: 0
STRIDOR: 0
CHEST TIGHTNESS: 0
VOMITING: 0
RHINORRHEA: 0
APNEA: 0
VOICE CHANGE: 0
WHEEZING: 0
RECTAL PAIN: 0
SINUS PRESSURE: 0
COLOR CHANGE: 0
EYE REDNESS: 0
COUGH: 0
DIARRHEA: 0
ANAL BLEEDING: 0
FACIAL SWELLING: 0
CHOKING: 0
PHOTOPHOBIA: 0
BLOOD IN STOOL: 0
ABDOMINAL PAIN: 0
BACK PAIN: 1
EYE DISCHARGE: 0
SORE THROAT: 0
EYE ITCHING: 0
EYE PAIN: 0
SHORTNESS OF BREATH: 0

## 2019-09-26 NOTE — TELEPHONE ENCOUNTER
I called Padminiferny Leonel back since she hung up, was on hold for a awhile before she picked up the phone call. She stated patient is not going to sign the waiver, the cost of the test is $2,290.00 and the patient can not afford to pay for the test.  I again explained to Abdelrahman Castaneda that we can have Dr. Maddi Jimenez update the diagnosis when he returns. She stated again the patient is not going to sign the waiver and they are unable to do the test without a correct diagnosis code or a signed waiver.

## 2019-09-26 NOTE — PROGRESS NOTES
Subjective:      Yannick Luevano is a 77 y.o. female who presents today for follow up and management of her chronic medical conditions as noted below. HPI:    Yannick Luevano is c/o of   Chief Complaint   Patient presents with    Leg Swelling     Left leg  swelling, sx for over a week     Hyperlipidemia   .   Lt leg redness     doppler was neg     Past Medical History:   Diagnosis Date    Allergic rhinitis, cause unspecified     Back pain     lumbar    Bowel obstruction (HCC)     history of due to scar tissue, resolved non-surgically    C. difficile diarrhea     CAD (coronary artery disease)     Cellulitis     left leg    Cellulitis 2017 August    leg left leg/bug bite    Diverticulosis of colon (without mention of hemorrhage)     GERD (gastroesophageal reflux disease)     History of MI (myocardial infarction) 2005    thought due to a blood clot    History of ovarian cyst 1970    had oopherectomy    History of peritonitis 1968    due to ruptured appendix age 13    HTN (hypertension)     Hx of blood clots     right leg    Hyperlipidemia     Intestinal or peritoneal adhesions with obstruction (postoperative) (postinfection) ([address]     Kidney infection     renal failure/sepsis/spider bite    Lateral epicondylitis  of elbow     Muscle strain     right posterior shoulder    Other abnormal glucose     Restless legs syndrome (RLS)     Vitamin D deficiency     Wears glasses        Past Surgical History:   Procedure Laterality Date    ABDOMEN SURGERY  1976    benign tumor removed near remaining overy, 1.5 pounds    APPENDECTOMY  1968    appendix ruptured, developed peritonitis    BUNIONECTOMY Left     along with calcium deposits removed   [address]  2005    negative    COLECTOMY  1969    12 INCHES REMOVED D/T OBSTRUCTION    COLONOSCOPY      CYST REMOVAL Right     right facial    HYSTERECTOMY  1973    taken as a result of recurring cysts    OTHER

## 2019-09-26 NOTE — PROGRESS NOTES
Visit Information    Have you changed or started any medications since your last visit including any over-the-counter medicines, vitamins, or herbal medicines? no   Are you having any side effects from any of your medications? -  no  Have you stopped taking any of your medications? Is so, why? -  no    Have you seen any other physician or provider since your last visit? Yes - Records Obtained  Have you had any other diagnostic tests since your last visit? Yes - Records Obtained  Have you been seen in the emergency room and/or had an admission to a hospital since we last saw you? Yes - Records Obtained  Have you had your routine dental cleaning in the past 6 months? no    Have you activated your [employer] account? If not, what are your barriers?  Yes     Patient Care Team:  Tammi Espino MD as PCP - Denise Schwartz MD as PCP - [facility]  Gordon aSles MD as Consulting Physician (Gastroenterology)    Medical History Review  Past Medical, Family, and Social History reviewed and does not contribute to the patient presenting condition    Health Maintenance   Topic Date Due    Diabetic foot exam  01/18/1961    Diabetic retinal exam  01/18/1961    DTaP/Tdap/Td vaccine (1 - Tdap) 01/18/1970    Shingles Vaccine (1 of 2) 01/18/2001    Annual Wellness Visit (AWV)  05/29/2019    Breast cancer screen  08/04/2019    Flu vaccine (1) 09/01/2019    Lipid screen  05/20/2020    A1C test (Diabetic or Prediabetic)  06/11/2020    Potassium monitoring  09/04/2020    Creatinine monitoring  09/04/2020    Colon cancer screen colonoscopy  10/07/2026    DEXA (modify frequency per FRAX score)  Completed    Pneumococcal 65+ years Vaccine  Completed    Hepatitis C screen  Completed     Chief Complaint   Patient presents with    Leg Swelling     Left leg  swelling, sx for over a week     Hyperlipidemia

## 2019-09-27 ENCOUNTER — TELEPHONE (OUTPATIENT)
Dept: INTERNAL MEDICINE CLINIC | Age: 69
End: 2019-09-27

## 2019-09-27 DIAGNOSIS — M79.89 LEFT LEG SWELLING: Primary | ICD-10-CM

## 2019-09-27 DIAGNOSIS — M79.89 LEFT LEG SWELLING: ICD-10-CM

## 2019-09-27 DIAGNOSIS — R52 PAIN: Primary | ICD-10-CM

## 2019-09-27 DIAGNOSIS — R52 PAIN: ICD-10-CM

## 2019-10-01 ENCOUNTER — TELEPHONE (OUTPATIENT)
Dept: ORTHOPEDIC SURGERY | Age: 69
End: 2019-10-01

## 2019-10-01 DIAGNOSIS — R60.9 PERIPHERAL EDEMA: ICD-10-CM

## 2019-10-01 DIAGNOSIS — M79.89 LEG SWELLING: Primary | ICD-10-CM

## 2019-10-01 DIAGNOSIS — I82.4Y2 ACUTE DEEP VEIN THROMBOSIS (DVT) OF PROXIMAL VEIN OF LEFT LOWER EXTREMITY (HCC): ICD-10-CM

## 2019-10-01 DIAGNOSIS — M79.89 LEFT LEG SWELLING: ICD-10-CM

## 2019-10-02 ENCOUNTER — HOSPITAL ENCOUNTER (OUTPATIENT)
Age: 69
Setting detail: SPECIMEN
Discharge: HOME | End: 2019-10-02
Payer: MEDICARE

## 2019-10-02 DIAGNOSIS — E11.59 TYPE 2 DIABETES MELLITUS WITH OTHER CIRCULATORY COMPLICATION, WITHOUT LONG-TERM CURRENT USE OF INSULIN (HCC): ICD-10-CM

## 2019-10-02 DIAGNOSIS — K57.91 GASTROINTESTINAL HEMORRHAGE ASSOCIATED WITH INTESTINAL DIVERTICULOSIS: ICD-10-CM

## 2019-10-02 DIAGNOSIS — K27.9 PUD (PEPTIC ULCER DISEASE): ICD-10-CM

## 2019-10-02 DIAGNOSIS — N18.30 STAGE 3 CHRONIC KIDNEY DISEASE (HCC): ICD-10-CM

## 2019-10-02 DIAGNOSIS — D50.8 OTHER IRON DEFICIENCY ANEMIA: ICD-10-CM

## 2019-10-02 LAB
ABSOLUTE EOS #: 0.31 K/UL (ref 0–0.44)
ABSOLUTE IMMATURE GRANULOCYTE: 0.03 K/UL (ref 0–0.3)
ABSOLUTE LYMPH #: 1.37 K/UL (ref 1.1–3.7)
ABSOLUTE MONO #: 0.57 K/UL (ref 0.1–1.2)
ANION GAP SERPL CALCULATED.3IONS-SCNC: 15 MMOL/L (ref 9–17)
BASOPHILS # BLD: 1 % (ref 0–2)
BASOPHILS ABSOLUTE: 0.05 K/UL (ref 0–0.2)
BUN BLDV-MCNC: 34 MG/DL (ref 8–23)
BUN/CREAT BLD: ABNORMAL (ref 9–20)
CALCIUM SERPL-MCNC: 9.1 MG/DL (ref 8.6–10.4)
CHLORIDE BLD-SCNC: 104 MMOL/L (ref 98–107)
CO2: 24 MMOL/L (ref 20–31)
CREAT SERPL-MCNC: 1.55 MG/DL (ref 0.5–0.9)
DIFFERENTIAL TYPE: ABNORMAL
EOSINOPHILS RELATIVE PERCENT: 5 % (ref 1–4)
ESTIMATED AVERAGE GLUCOSE: 111 MG/DL
GFR AFRICAN AMERICAN: 40 ML/MIN
GFR NON-AFRICAN AMERICAN: 33 ML/MIN
GFR SERPL CREATININE-BSD FRML MDRD: ABNORMAL ML/MIN/{1.73_M2}
GFR SERPL CREATININE-BSD FRML MDRD: ABNORMAL ML/MIN/{1.73_M2}
GLUCOSE BLD-MCNC: 119 MG/DL (ref 70–99)
HBA1C MFR BLD: 5.5 % (ref 4–6)
HCT VFR BLD CALC: 31.8 % (ref 36.3–47.1)
HEMOGLOBIN: 9.8 G/DL (ref 11.9–15.1)
IMMATURE GRANULOCYTES: 1 %
LYMPHOCYTES # BLD: 22 % (ref 24–43)
MCH RBC QN AUTO: 31.1 PG (ref 25.2–33.5)
MCHC RBC AUTO-ENTMCNC: 30.8 G/DL (ref 28.4–34.8)
MCV RBC AUTO: 101 FL (ref 82.6–102.9)
MONOCYTES # BLD: 9 % (ref 3–12)
NRBC AUTOMATED: 0 PER 100 WBC
PDW BLD-RTO: 13.4 % (ref 11.8–14.4)
PLATELET # BLD: 359 K/UL (ref 138–453)
PLATELET ESTIMATE: ABNORMAL
PMV BLD AUTO: 10.4 FL (ref 8.1–13.5)
POTASSIUM SERPL-SCNC: 4.2 MMOL/L (ref 3.7–5.3)
RBC # BLD: 3.15 M/UL (ref 3.95–5.11)
RBC # BLD: ABNORMAL 10*6/UL
SEG NEUTROPHILS: 62 % (ref 36–65)
SEGMENTED NEUTROPHILS ABSOLUTE COUNT: 4.04 K/UL (ref 1.5–8.1)
SODIUM BLD-SCNC: 143 MMOL/L (ref 135–144)
WBC # BLD: 6.4 K/UL (ref 3.5–11.3)
WBC # BLD: ABNORMAL 10*3/UL

## 2019-10-03 ENCOUNTER — TELEPHONE (OUTPATIENT)
Dept: INTERNAL MEDICINE CLINIC | Age: 69
End: 2019-10-03

## 2019-10-07 ENCOUNTER — HOSPITAL ENCOUNTER (OUTPATIENT)
Dept: CT IMAGING | Age: 69
Discharge: HOME | End: 2019-10-09
Payer: MEDICARE

## 2019-10-07 DIAGNOSIS — M79.89 LEFT LEG SWELLING: ICD-10-CM

## 2019-10-07 LAB
BUN BLDV-MCNC: 33 MG/DL (ref 8–23)
CREAT SERPL-MCNC: 1.26 MG/DL (ref 0.5–0.9)
GFR AFRICAN AMERICAN: 51 ML/MIN
GFR NON-AFRICAN AMERICAN: 42 ML/MIN
GFR SERPL CREATININE-BSD FRML MDRD: ABNORMAL ML/MIN/{1.73_M2}
GFR SERPL CREATININE-BSD FRML MDRD: ABNORMAL ML/MIN/{1.73_M2}

## 2019-10-07 PROCEDURE — 36415 COLL VENOUS BLD VENIPUNCTURE: CPT

## 2019-10-07 PROCEDURE — 2580000003 HC RX 258: Performed by: ORTHOPAEDIC SURGERY

## 2019-10-07 PROCEDURE — 84520 ASSAY OF UREA NITROGEN: CPT

## 2019-10-07 PROCEDURE — 82565 ASSAY OF CREATININE: CPT

## 2019-10-07 PROCEDURE — 75635 CT ANGIO ABDOMINAL ARTERIES: CPT

## 2019-10-07 PROCEDURE — 6360000004 HC RX CONTRAST MEDICATION: Performed by: ORTHOPAEDIC SURGERY

## 2019-10-07 RX ORDER — SODIUM CHLORIDE 0.9 % (FLUSH) 0.9 %
10 SYRINGE (ML) INJECTION PRN
Status: DISCONTINUED | OUTPATIENT
Start: 2019-10-07 | End: 2019-10-10 | Stop reason: HOSPADM

## 2019-10-07 RX ORDER — 0.9 % SODIUM CHLORIDE 0.9 %
80 INTRAVENOUS SOLUTION INTRAVENOUS ONCE
Status: COMPLETED | OUTPATIENT
Start: 2019-10-07 | End: 2019-10-07

## 2019-10-07 RX ADMIN — IOVERSOL 150 ML: 741 INJECTION INTRA-ARTERIAL; INTRAVENOUS at 15:13

## 2019-10-07 RX ADMIN — SODIUM CHLORIDE 80 ML: 9 INJECTION, SOLUTION INTRAVENOUS at 15:13

## 2019-10-07 RX ADMIN — Medication 10 ML: at 15:13

## 2019-10-09 ENCOUNTER — HOSPITAL ENCOUNTER (OUTPATIENT)
Dept: PAIN MANAGEMENT | Age: 69
Discharge: HOME | End: 2019-10-09
Payer: MEDICARE

## 2019-10-09 VITALS
DIASTOLIC BLOOD PRESSURE: 69 MMHG | WEIGHT: 164 LBS | TEMPERATURE: 98.7 F | OXYGEN SATURATION: 98 % | SYSTOLIC BLOOD PRESSURE: 139 MMHG | HEART RATE: 67 BPM | BODY MASS INDEX: 29.06 KG/M2 | HEIGHT: 63 IN | RESPIRATION RATE: 16 BRPM

## 2019-10-09 DIAGNOSIS — M16.0 PRIMARY OSTEOARTHRITIS OF BOTH HIPS: ICD-10-CM

## 2019-10-09 DIAGNOSIS — M54.42 CHRONIC BILATERAL LOW BACK PAIN WITH LEFT-SIDED SCIATICA: ICD-10-CM

## 2019-10-09 DIAGNOSIS — M46.1 SACROILIITIS (HCC): ICD-10-CM

## 2019-10-09 DIAGNOSIS — G89.29 CHRONIC BILATERAL LOW BACK PAIN WITH LEFT-SIDED SCIATICA: ICD-10-CM

## 2019-10-09 DIAGNOSIS — G57.11 MERALGIA PARESTHETICA OF RIGHT SIDE: Primary | ICD-10-CM

## 2019-10-09 DIAGNOSIS — M47.816 FACET ARTHRITIS OF LUMBAR REGION: ICD-10-CM

## 2019-10-09 DIAGNOSIS — M47.816 SPONDYLOSIS OF LUMBAR REGION WITHOUT MYELOPATHY OR RADICULOPATHY: ICD-10-CM

## 2019-10-09 DIAGNOSIS — Z51.81 ENCOUNTER FOR MEDICATION MONITORING: ICD-10-CM

## 2019-10-09 DIAGNOSIS — G89.29 CHRONIC LEFT-SIDED LOW BACK PAIN WITH LEFT-SIDED SCIATICA: ICD-10-CM

## 2019-10-09 DIAGNOSIS — M47.816 LUMBAR FACET ARTHROPATHY: ICD-10-CM

## 2019-10-09 DIAGNOSIS — M54.42 CHRONIC LEFT-SIDED LOW BACK PAIN WITH LEFT-SIDED SCIATICA: ICD-10-CM

## 2019-10-09 PROCEDURE — 99213 OFFICE O/P EST LOW 20 MIN: CPT

## 2019-10-09 PROCEDURE — 99213 OFFICE O/P EST LOW 20 MIN: CPT | Performed by: NURSE PRACTITIONER

## 2019-10-09 RX ORDER — OXYCODONE AND ACETAMINOPHEN 7.5; 325 MG/1; MG/1
1 TABLET ORAL EVERY 8 HOURS PRN
Qty: 90 TABLET | Refills: 0 | Status: SHIPPED | OUTPATIENT
Start: 2019-10-09 | End: 2019-11-06 | Stop reason: SDUPTHER

## 2019-10-09 ASSESSMENT — ENCOUNTER SYMPTOMS
BACK PAIN: 1
EYES NEGATIVE: 1
CONSTIPATION: 1
RESPIRATORY NEGATIVE: 1

## 2019-10-10 ENCOUNTER — OFFICE VISIT (OUTPATIENT)
Dept: INTERNAL MEDICINE CLINIC | Age: 69
End: 2019-10-10
Payer: MEDICARE

## 2019-10-10 VITALS
SYSTOLIC BLOOD PRESSURE: 114 MMHG | BODY MASS INDEX: 29.59 KG/M2 | DIASTOLIC BLOOD PRESSURE: 62 MMHG | HEIGHT: 63 IN | WEIGHT: 167 LBS

## 2019-10-10 DIAGNOSIS — E11.69 TYPE 2 DIABETES MELLITUS WITH OTHER SPECIFIED COMPLICATION, UNSPECIFIED WHETHER LONG TERM INSULIN USE (HCC): ICD-10-CM

## 2019-10-10 DIAGNOSIS — N18.30 STAGE 3 CHRONIC KIDNEY DISEASE (HCC): ICD-10-CM

## 2019-10-10 DIAGNOSIS — I82.531 CHRONIC DEEP VEIN THROMBOSIS (DVT) OF POPLITEAL VEIN OF RIGHT LOWER EXTREMITY (HCC): ICD-10-CM

## 2019-10-10 DIAGNOSIS — Z12.31 ENCOUNTER FOR SCREENING MAMMOGRAM FOR BREAST CANCER: Primary | ICD-10-CM

## 2019-10-10 DIAGNOSIS — E11.59 TYPE 2 DIABETES MELLITUS WITH OTHER CIRCULATORY COMPLICATION, WITHOUT LONG-TERM CURRENT USE OF INSULIN (HCC): ICD-10-CM

## 2019-10-10 DIAGNOSIS — I50.32 CHRONIC DIASTOLIC HEART FAILURE (HCC): ICD-10-CM

## 2019-10-10 DIAGNOSIS — F32.A DEPRESSION, UNSPECIFIED DEPRESSION TYPE: ICD-10-CM

## 2019-10-10 DIAGNOSIS — I10 ESSENTIAL HYPERTENSION: ICD-10-CM

## 2019-10-10 PROCEDURE — G8484 FLU IMMUNIZE NO ADMIN: HCPCS | Performed by: INTERNAL MEDICINE

## 2019-10-10 PROCEDURE — 4040F PNEUMOC VAC/ADMIN/RCVD: CPT | Performed by: INTERNAL MEDICINE

## 2019-10-10 PROCEDURE — 1123F ACP DISCUSS/DSCN MKR DOCD: CPT | Performed by: INTERNAL MEDICINE

## 2019-10-10 PROCEDURE — 99214 OFFICE O/P EST MOD 30 MIN: CPT | Performed by: INTERNAL MEDICINE

## 2019-10-10 PROCEDURE — 2022F DILAT RTA XM EVC RTNOPTHY: CPT | Performed by: INTERNAL MEDICINE

## 2019-10-10 PROCEDURE — 3017F COLORECTAL CA SCREEN DOC REV: CPT | Performed by: INTERNAL MEDICINE

## 2019-10-10 PROCEDURE — G8399 PT W/DXA RESULTS DOCUMENT: HCPCS | Performed by: INTERNAL MEDICINE

## 2019-10-10 PROCEDURE — 1090F PRES/ABSN URINE INCON ASSESS: CPT | Performed by: INTERNAL MEDICINE

## 2019-10-10 PROCEDURE — G8417 CALC BMI ABV UP PARAM F/U: HCPCS | Performed by: INTERNAL MEDICINE

## 2019-10-10 PROCEDURE — G8427 DOCREV CUR MEDS BY ELIG CLIN: HCPCS | Performed by: INTERNAL MEDICINE

## 2019-10-10 PROCEDURE — 1036F TOBACCO NON-USER: CPT | Performed by: INTERNAL MEDICINE

## 2019-10-10 PROCEDURE — G8598 ASA/ANTIPLAT THER USED: HCPCS | Performed by: INTERNAL MEDICINE

## 2019-10-10 PROCEDURE — 3044F HG A1C LEVEL LT 7.0%: CPT | Performed by: INTERNAL MEDICINE

## 2019-10-10 RX ORDER — GLUCOSAMINE HCL/CHONDROITIN SU 500-400 MG
CAPSULE ORAL
Qty: 100 STRIP | Refills: 5 | Status: SHIPPED | OUTPATIENT
Start: 2019-10-10

## 2019-10-10 RX ORDER — LANCETS 30 GAUGE
1 EACH MISCELLANEOUS DAILY
Qty: 100 EACH | Refills: 3 | Status: SHIPPED | OUTPATIENT
Start: 2019-10-10

## 2019-10-10 ASSESSMENT — ENCOUNTER SYMPTOMS
CHEST TIGHTNESS: 0
EYE ITCHING: 0
SHORTNESS OF BREATH: 0
BACK PAIN: 1
COLOR CHANGE: 1
SINUS PRESSURE: 0
ABDOMINAL PAIN: 0
CHOKING: 0
PHOTOPHOBIA: 0
CONSTIPATION: 0
EYE REDNESS: 0
ANAL BLEEDING: 0
EYE DISCHARGE: 0
TROUBLE SWALLOWING: 0
EYE PAIN: 0
APNEA: 0
WHEEZING: 0
BLOOD IN STOOL: 0
FACIAL SWELLING: 0
RECTAL PAIN: 0
RHINORRHEA: 0
NAUSEA: 0
ABDOMINAL DISTENTION: 0
VOICE CHANGE: 0
SORE THROAT: 0
STRIDOR: 0
VOMITING: 0
DIARRHEA: 0
COUGH: 0

## 2019-10-11 RX ORDER — FUROSEMIDE 40 MG/1
40 TABLET ORAL DAILY
Qty: 90 TABLET | Refills: 2 | Status: SHIPPED | OUTPATIENT
Start: 2019-10-11 | End: 2020-01-20 | Stop reason: SDUPTHER

## 2019-10-15 ENCOUNTER — OFFICE VISIT (OUTPATIENT)
Dept: ORTHOPEDIC SURGERY | Age: 69
End: 2019-10-15
Payer: MEDICARE

## 2019-10-15 VITALS — HEIGHT: 63 IN | BODY MASS INDEX: 29.59 KG/M2 | WEIGHT: 167 LBS

## 2019-10-15 DIAGNOSIS — R60.9 PERIPHERAL EDEMA: ICD-10-CM

## 2019-10-15 DIAGNOSIS — M43.10 ACQUIRED SPONDYLOLISTHESIS: ICD-10-CM

## 2019-10-15 DIAGNOSIS — M79.89 LEG SWELLING: ICD-10-CM

## 2019-10-15 DIAGNOSIS — M48.062 SPINAL STENOSIS OF LUMBAR REGION WITH NEUROGENIC CLAUDICATION: Primary | ICD-10-CM

## 2019-10-15 DIAGNOSIS — M47.26 OTHER SPONDYLOSIS WITH RADICULOPATHY, LUMBAR REGION: ICD-10-CM

## 2019-10-15 PROCEDURE — G8598 ASA/ANTIPLAT THER USED: HCPCS | Performed by: ORTHOPAEDIC SURGERY

## 2019-10-15 PROCEDURE — G8399 PT W/DXA RESULTS DOCUMENT: HCPCS | Performed by: ORTHOPAEDIC SURGERY

## 2019-10-15 PROCEDURE — 99213 OFFICE O/P EST LOW 20 MIN: CPT | Performed by: ORTHOPAEDIC SURGERY

## 2019-10-15 PROCEDURE — 3017F COLORECTAL CA SCREEN DOC REV: CPT | Performed by: ORTHOPAEDIC SURGERY

## 2019-10-15 PROCEDURE — G8427 DOCREV CUR MEDS BY ELIG CLIN: HCPCS | Performed by: ORTHOPAEDIC SURGERY

## 2019-10-15 PROCEDURE — G8417 CALC BMI ABV UP PARAM F/U: HCPCS | Performed by: ORTHOPAEDIC SURGERY

## 2019-10-15 PROCEDURE — 4040F PNEUMOC VAC/ADMIN/RCVD: CPT | Performed by: ORTHOPAEDIC SURGERY

## 2019-10-15 PROCEDURE — G8484 FLU IMMUNIZE NO ADMIN: HCPCS | Performed by: ORTHOPAEDIC SURGERY

## 2019-10-15 PROCEDURE — 1036F TOBACCO NON-USER: CPT | Performed by: ORTHOPAEDIC SURGERY

## 2019-10-15 PROCEDURE — 1123F ACP DISCUSS/DSCN MKR DOCD: CPT | Performed by: ORTHOPAEDIC SURGERY

## 2019-10-15 PROCEDURE — 1090F PRES/ABSN URINE INCON ASSESS: CPT | Performed by: ORTHOPAEDIC SURGERY

## 2019-10-25 RX ORDER — ATORVASTATIN CALCIUM 80 MG/1
40 TABLET, FILM COATED ORAL NIGHTLY
Qty: 90 TABLET | Refills: 3 | Status: CANCELLED | OUTPATIENT
Start: 2019-10-25

## 2019-11-06 ENCOUNTER — HOSPITAL ENCOUNTER (OUTPATIENT)
Dept: PAIN MANAGEMENT | Age: 69
Discharge: HOME | End: 2019-11-06
Payer: MEDICARE

## 2019-11-06 VITALS
OXYGEN SATURATION: 98 % | WEIGHT: 167 LBS | TEMPERATURE: 98.5 F | RESPIRATION RATE: 16 BRPM | SYSTOLIC BLOOD PRESSURE: 134 MMHG | HEIGHT: 63 IN | DIASTOLIC BLOOD PRESSURE: 68 MMHG | BODY MASS INDEX: 29.59 KG/M2 | HEART RATE: 66 BPM

## 2019-11-06 DIAGNOSIS — M54.42 CHRONIC BILATERAL LOW BACK PAIN WITH LEFT-SIDED SCIATICA: ICD-10-CM

## 2019-11-06 DIAGNOSIS — M47.816 FACET ARTHRITIS OF LUMBAR REGION: ICD-10-CM

## 2019-11-06 DIAGNOSIS — G89.29 CHRONIC LEFT-SIDED LOW BACK PAIN WITH LEFT-SIDED SCIATICA: Primary | ICD-10-CM

## 2019-11-06 DIAGNOSIS — M51.36 LUMBAR DEGENERATIVE DISC DISEASE: ICD-10-CM

## 2019-11-06 DIAGNOSIS — G89.29 CHRONIC BILATERAL LOW BACK PAIN WITH LEFT-SIDED SCIATICA: ICD-10-CM

## 2019-11-06 DIAGNOSIS — M47.816 SPONDYLOSIS OF LUMBAR REGION WITHOUT MYELOPATHY OR RADICULOPATHY: ICD-10-CM

## 2019-11-06 DIAGNOSIS — M46.1 SACROILIITIS (HCC): ICD-10-CM

## 2019-11-06 DIAGNOSIS — G57.11 MERALGIA PARESTHETICA OF RIGHT SIDE: ICD-10-CM

## 2019-11-06 DIAGNOSIS — M47.816 LUMBAR FACET ARTHROPATHY: ICD-10-CM

## 2019-11-06 DIAGNOSIS — M54.42 CHRONIC LEFT-SIDED LOW BACK PAIN WITH LEFT-SIDED SCIATICA: Primary | ICD-10-CM

## 2019-11-06 DIAGNOSIS — Z51.81 ENCOUNTER FOR MEDICATION MONITORING: ICD-10-CM

## 2019-11-06 PROCEDURE — 99213 OFFICE O/P EST LOW 20 MIN: CPT | Performed by: NURSE PRACTITIONER

## 2019-11-06 PROCEDURE — 99213 OFFICE O/P EST LOW 20 MIN: CPT

## 2019-11-06 RX ORDER — OXYCODONE AND ACETAMINOPHEN 7.5; 325 MG/1; MG/1
1 TABLET ORAL EVERY 8 HOURS PRN
Qty: 90 TABLET | Refills: 0 | Status: SHIPPED | OUTPATIENT
Start: 2019-11-08 | End: 2019-12-03 | Stop reason: SDUPTHER

## 2019-11-06 ASSESSMENT — ENCOUNTER SYMPTOMS
BACK PAIN: 1
RESPIRATORY NEGATIVE: 1
GASTROINTESTINAL NEGATIVE: 1

## 2019-11-11 RX ORDER — ATORVASTATIN CALCIUM 80 MG/1
40 TABLET, FILM COATED ORAL NIGHTLY
Qty: 90 TABLET | Refills: 3 | Status: SHIPPED | OUTPATIENT
Start: 2019-11-11 | End: 2020-03-16 | Stop reason: SDUPTHER

## 2019-11-11 RX ORDER — PANTOPRAZOLE SODIUM 40 MG/1
40 TABLET, DELAYED RELEASE ORAL
Qty: 90 TABLET | Refills: 1 | Status: SHIPPED | OUTPATIENT
Start: 2019-11-11 | End: 2020-03-16 | Stop reason: SDUPTHER

## 2019-11-18 ENCOUNTER — TELEPHONE (OUTPATIENT)
Dept: GASTROENTEROLOGY | Age: 69
End: 2019-11-18

## 2019-11-18 ENCOUNTER — OFFICE VISIT (OUTPATIENT)
Dept: INTERNAL MEDICINE CLINIC | Age: 69
End: 2019-11-18
Payer: MEDICARE

## 2019-11-18 ENCOUNTER — TELEPHONE (OUTPATIENT)
Dept: INTERNAL MEDICINE CLINIC | Age: 69
End: 2019-11-18

## 2019-11-18 VITALS
SYSTOLIC BLOOD PRESSURE: 132 MMHG | HEIGHT: 63 IN | DIASTOLIC BLOOD PRESSURE: 64 MMHG | BODY MASS INDEX: 29.59 KG/M2 | OXYGEN SATURATION: 95 % | WEIGHT: 167 LBS | HEART RATE: 73 BPM

## 2019-11-18 DIAGNOSIS — I50.32 CHRONIC DIASTOLIC HEART FAILURE (HCC): ICD-10-CM

## 2019-11-18 DIAGNOSIS — M48.062 SPINAL STENOSIS OF LUMBAR REGION WITH NEUROGENIC CLAUDICATION: ICD-10-CM

## 2019-11-18 DIAGNOSIS — E11.59 TYPE 2 DIABETES MELLITUS WITH OTHER CIRCULATORY COMPLICATION, WITHOUT LONG-TERM CURRENT USE OF INSULIN (HCC): ICD-10-CM

## 2019-11-18 DIAGNOSIS — I63.429 CEREBROVASCULAR ACCIDENT (CVA) DUE TO EMBOLISM OF ANTERIOR CEREBRAL ARTERY, UNSPECIFIED BLOOD VESSEL LATERALITY (HCC): Primary | ICD-10-CM

## 2019-11-18 DIAGNOSIS — I10 ESSENTIAL HYPERTENSION: ICD-10-CM

## 2019-11-18 DIAGNOSIS — N18.30 STAGE 3 CHRONIC KIDNEY DISEASE (HCC): ICD-10-CM

## 2019-11-18 PROCEDURE — G8484 FLU IMMUNIZE NO ADMIN: HCPCS | Performed by: INTERNAL MEDICINE

## 2019-11-18 PROCEDURE — 1090F PRES/ABSN URINE INCON ASSESS: CPT | Performed by: INTERNAL MEDICINE

## 2019-11-18 PROCEDURE — G8417 CALC BMI ABV UP PARAM F/U: HCPCS | Performed by: INTERNAL MEDICINE

## 2019-11-18 PROCEDURE — 4040F PNEUMOC VAC/ADMIN/RCVD: CPT | Performed by: INTERNAL MEDICINE

## 2019-11-18 PROCEDURE — G8427 DOCREV CUR MEDS BY ELIG CLIN: HCPCS | Performed by: INTERNAL MEDICINE

## 2019-11-18 PROCEDURE — 3017F COLORECTAL CA SCREEN DOC REV: CPT | Performed by: INTERNAL MEDICINE

## 2019-11-18 PROCEDURE — G8598 ASA/ANTIPLAT THER USED: HCPCS | Performed by: INTERNAL MEDICINE

## 2019-11-18 PROCEDURE — 3044F HG A1C LEVEL LT 7.0%: CPT | Performed by: INTERNAL MEDICINE

## 2019-11-18 PROCEDURE — 2022F DILAT RTA XM EVC RTNOPTHY: CPT | Performed by: INTERNAL MEDICINE

## 2019-11-18 PROCEDURE — 99214 OFFICE O/P EST MOD 30 MIN: CPT | Performed by: INTERNAL MEDICINE

## 2019-11-18 PROCEDURE — 1036F TOBACCO NON-USER: CPT | Performed by: INTERNAL MEDICINE

## 2019-11-18 PROCEDURE — 1123F ACP DISCUSS/DSCN MKR DOCD: CPT | Performed by: INTERNAL MEDICINE

## 2019-11-18 PROCEDURE — G8399 PT W/DXA RESULTS DOCUMENT: HCPCS | Performed by: INTERNAL MEDICINE

## 2019-11-18 ASSESSMENT — ENCOUNTER SYMPTOMS
EYE REDNESS: 0
APNEA: 0
CHOKING: 0
DIARRHEA: 0
COUGH: 0
SHORTNESS OF BREATH: 0
ABDOMINAL PAIN: 0
BLOOD IN STOOL: 0
CONSTIPATION: 0
BACK PAIN: 1
EYE PAIN: 0
EYE DISCHARGE: 0
ABDOMINAL DISTENTION: 0
COLOR CHANGE: 0
EYE ITCHING: 0
CHEST TIGHTNESS: 0

## 2019-11-26 DIAGNOSIS — G25.81 RESTLESS LEGS SYNDROME (RLS): ICD-10-CM

## 2019-11-26 DIAGNOSIS — E78.5 HYPERLIPIDEMIA, UNSPECIFIED HYPERLIPIDEMIA TYPE: ICD-10-CM

## 2019-11-26 DIAGNOSIS — I10 HYPERTENSION, UNSPECIFIED TYPE: ICD-10-CM

## 2019-11-26 RX ORDER — PRAMIPEXOLE DIHYDROCHLORIDE 1 MG/1
1.5 TABLET ORAL DAILY
Qty: 145 TABLET | Refills: 3 | Status: SHIPPED | OUTPATIENT
Start: 2019-11-26 | End: 2020-03-16 | Stop reason: SDUPTHER

## 2019-11-26 RX ORDER — FENOFIBRATE 134 MG/1
134 CAPSULE ORAL
Qty: 90 CAPSULE | Refills: 3 | Status: ON HOLD | OUTPATIENT
Start: 2019-11-26 | End: 2020-05-08 | Stop reason: SDUPTHER

## 2019-11-26 RX ORDER — LISINOPRIL 10 MG/1
10 TABLET ORAL DAILY
Qty: 90 TABLET | Refills: 3 | Status: ON HOLD | OUTPATIENT
Start: 2019-11-26 | End: 2020-02-24 | Stop reason: HOSPADM

## 2019-11-27 ENCOUNTER — TELEPHONE (OUTPATIENT)
Dept: GASTROENTEROLOGY | Age: 69
End: 2019-11-27

## 2019-12-02 ENCOUNTER — OFFICE VISIT (OUTPATIENT)
Dept: GASTROENTEROLOGY | Age: 69
End: 2019-12-02
Payer: MEDICARE

## 2019-12-02 VITALS
DIASTOLIC BLOOD PRESSURE: 76 MMHG | BODY MASS INDEX: 28.95 KG/M2 | SYSTOLIC BLOOD PRESSURE: 178 MMHG | HEART RATE: 73 BPM | WEIGHT: 163.4 LBS

## 2019-12-02 DIAGNOSIS — K21.9 GASTROESOPHAGEAL REFLUX DISEASE WITHOUT ESOPHAGITIS: Primary | ICD-10-CM

## 2019-12-02 DIAGNOSIS — K27.9 PUD (PEPTIC ULCER DISEASE): ICD-10-CM

## 2019-12-02 DIAGNOSIS — D50.8 OTHER IRON DEFICIENCY ANEMIA: ICD-10-CM

## 2019-12-02 PROCEDURE — G8598 ASA/ANTIPLAT THER USED: HCPCS | Performed by: INTERNAL MEDICINE

## 2019-12-02 PROCEDURE — G8417 CALC BMI ABV UP PARAM F/U: HCPCS | Performed by: INTERNAL MEDICINE

## 2019-12-02 PROCEDURE — G8427 DOCREV CUR MEDS BY ELIG CLIN: HCPCS | Performed by: INTERNAL MEDICINE

## 2019-12-02 PROCEDURE — 1090F PRES/ABSN URINE INCON ASSESS: CPT | Performed by: INTERNAL MEDICINE

## 2019-12-02 PROCEDURE — G8399 PT W/DXA RESULTS DOCUMENT: HCPCS | Performed by: INTERNAL MEDICINE

## 2019-12-02 PROCEDURE — 3017F COLORECTAL CA SCREEN DOC REV: CPT | Performed by: INTERNAL MEDICINE

## 2019-12-02 PROCEDURE — 99214 OFFICE O/P EST MOD 30 MIN: CPT | Performed by: INTERNAL MEDICINE

## 2019-12-02 PROCEDURE — 1123F ACP DISCUSS/DSCN MKR DOCD: CPT | Performed by: INTERNAL MEDICINE

## 2019-12-02 PROCEDURE — G8484 FLU IMMUNIZE NO ADMIN: HCPCS | Performed by: INTERNAL MEDICINE

## 2019-12-02 PROCEDURE — 1036F TOBACCO NON-USER: CPT | Performed by: INTERNAL MEDICINE

## 2019-12-02 PROCEDURE — 4040F PNEUMOC VAC/ADMIN/RCVD: CPT | Performed by: INTERNAL MEDICINE

## 2019-12-02 RX ORDER — FERROUS SULFATE 325(65) MG
325 TABLET ORAL
Qty: 180 TABLET | Refills: 1 | Status: SHIPPED | OUTPATIENT
Start: 2019-12-02 | End: 2020-01-02 | Stop reason: SDUPTHER

## 2019-12-02 ASSESSMENT — ENCOUNTER SYMPTOMS
VOICE CHANGE: 0
ALLERGIC/IMMUNOLOGIC NEGATIVE: 1
TROUBLE SWALLOWING: 0
RESPIRATORY NEGATIVE: 1
VOMITING: 0
SORE THROAT: 0
RECTAL PAIN: 0
NAUSEA: 0
ANAL BLEEDING: 0
COUGH: 0
WHEEZING: 0
BLOOD IN STOOL: 0
ABDOMINAL PAIN: 0
EYES NEGATIVE: 1
GASTROINTESTINAL NEGATIVE: 1
CONSTIPATION: 0
ABDOMINAL DISTENTION: 0
BACK PAIN: 1
CHOKING: 0
SINUS PRESSURE: 0
DIARRHEA: 0

## 2019-12-03 ENCOUNTER — HOSPITAL ENCOUNTER (OUTPATIENT)
Dept: PAIN MANAGEMENT | Age: 69
Discharge: HOME | End: 2019-12-03
Payer: MEDICARE

## 2019-12-03 VITALS
HEART RATE: 69 BPM | WEIGHT: 164 LBS | RESPIRATION RATE: 16 BRPM | OXYGEN SATURATION: 95 % | SYSTOLIC BLOOD PRESSURE: 145 MMHG | DIASTOLIC BLOOD PRESSURE: 81 MMHG | TEMPERATURE: 98.1 F | HEIGHT: 63 IN | BODY MASS INDEX: 29.06 KG/M2

## 2019-12-03 DIAGNOSIS — M54.42 CHRONIC BILATERAL LOW BACK PAIN WITH LEFT-SIDED SCIATICA: ICD-10-CM

## 2019-12-03 DIAGNOSIS — G89.29 CHRONIC LEFT-SIDED LOW BACK PAIN WITH LEFT-SIDED SCIATICA: ICD-10-CM

## 2019-12-03 DIAGNOSIS — M47.816 LUMBAR FACET ARTHROPATHY: ICD-10-CM

## 2019-12-03 DIAGNOSIS — Z51.81 ENCOUNTER FOR MEDICATION MONITORING: ICD-10-CM

## 2019-12-03 DIAGNOSIS — M47.816 SPONDYLOSIS OF LUMBAR REGION WITHOUT MYELOPATHY OR RADICULOPATHY: Primary | ICD-10-CM

## 2019-12-03 DIAGNOSIS — G57.11 MERALGIA PARESTHETICA OF RIGHT SIDE: ICD-10-CM

## 2019-12-03 DIAGNOSIS — M46.1 SACROILIITIS (HCC): ICD-10-CM

## 2019-12-03 DIAGNOSIS — M47.816 FACET ARTHRITIS OF LUMBAR REGION: ICD-10-CM

## 2019-12-03 DIAGNOSIS — M54.42 CHRONIC LEFT-SIDED LOW BACK PAIN WITH LEFT-SIDED SCIATICA: ICD-10-CM

## 2019-12-03 DIAGNOSIS — M51.36 LUMBAR DEGENERATIVE DISC DISEASE: ICD-10-CM

## 2019-12-03 DIAGNOSIS — G89.29 CHRONIC BILATERAL LOW BACK PAIN WITH LEFT-SIDED SCIATICA: ICD-10-CM

## 2019-12-03 PROCEDURE — 99213 OFFICE O/P EST LOW 20 MIN: CPT

## 2019-12-03 RX ORDER — OXYCODONE AND ACETAMINOPHEN 7.5; 325 MG/1; MG/1
1 TABLET ORAL EVERY 8 HOURS PRN
Qty: 90 TABLET | Refills: 0 | Status: SHIPPED | OUTPATIENT
Start: 2019-12-08 | End: 2020-01-03 | Stop reason: SDUPTHER

## 2019-12-03 RX ORDER — OXYCODONE AND ACETAMINOPHEN 7.5; 325 MG/1; MG/1
1 TABLET ORAL EVERY 8 HOURS PRN
Qty: 90 TABLET | Refills: 0 | Status: SHIPPED | OUTPATIENT
Start: 2019-12-03 | End: 2019-12-03 | Stop reason: SDUPTHER

## 2019-12-03 ASSESSMENT — ENCOUNTER SYMPTOMS
BACK PAIN: 1
RESPIRATORY NEGATIVE: 1
GASTROINTESTINAL NEGATIVE: 1
EYES NEGATIVE: 1

## 2019-12-10 ENCOUNTER — OFFICE VISIT (OUTPATIENT)
Dept: ORTHOPEDIC SURGERY | Age: 69
End: 2019-12-10
Payer: MEDICARE

## 2019-12-10 DIAGNOSIS — M48.062 SPINAL STENOSIS OF LUMBAR REGION WITH NEUROGENIC CLAUDICATION: Primary | ICD-10-CM

## 2019-12-10 DIAGNOSIS — M47.816 SPONDYLOSIS OF LUMBAR REGION WITHOUT MYELOPATHY OR RADICULOPATHY: ICD-10-CM

## 2019-12-10 DIAGNOSIS — M43.10 ACQUIRED SPONDYLOLISTHESIS: ICD-10-CM

## 2019-12-10 DIAGNOSIS — M54.42 CHRONIC LEFT-SIDED LOW BACK PAIN WITH LEFT-SIDED SCIATICA: ICD-10-CM

## 2019-12-10 DIAGNOSIS — M79.89 LEG SWELLING: ICD-10-CM

## 2019-12-10 DIAGNOSIS — M51.36 LUMBAR DEGENERATIVE DISC DISEASE: ICD-10-CM

## 2019-12-10 DIAGNOSIS — G89.29 CHRONIC LEFT-SIDED LOW BACK PAIN WITH LEFT-SIDED SCIATICA: ICD-10-CM

## 2019-12-10 DIAGNOSIS — M47.26 OTHER SPONDYLOSIS WITH RADICULOPATHY, LUMBAR REGION: ICD-10-CM

## 2019-12-10 DIAGNOSIS — M47.816 FACET ARTHRITIS OF LUMBAR REGION: ICD-10-CM

## 2019-12-10 PROCEDURE — 3017F COLORECTAL CA SCREEN DOC REV: CPT | Performed by: ORTHOPAEDIC SURGERY

## 2019-12-10 PROCEDURE — G8427 DOCREV CUR MEDS BY ELIG CLIN: HCPCS | Performed by: ORTHOPAEDIC SURGERY

## 2019-12-10 PROCEDURE — G8598 ASA/ANTIPLAT THER USED: HCPCS | Performed by: ORTHOPAEDIC SURGERY

## 2019-12-10 PROCEDURE — 99213 OFFICE O/P EST LOW 20 MIN: CPT | Performed by: ORTHOPAEDIC SURGERY

## 2019-12-10 PROCEDURE — 1036F TOBACCO NON-USER: CPT | Performed by: ORTHOPAEDIC SURGERY

## 2019-12-10 PROCEDURE — 1123F ACP DISCUSS/DSCN MKR DOCD: CPT | Performed by: ORTHOPAEDIC SURGERY

## 2019-12-10 PROCEDURE — 4040F PNEUMOC VAC/ADMIN/RCVD: CPT | Performed by: ORTHOPAEDIC SURGERY

## 2019-12-10 PROCEDURE — G8399 PT W/DXA RESULTS DOCUMENT: HCPCS | Performed by: ORTHOPAEDIC SURGERY

## 2019-12-10 PROCEDURE — G8417 CALC BMI ABV UP PARAM F/U: HCPCS | Performed by: ORTHOPAEDIC SURGERY

## 2019-12-10 PROCEDURE — 1090F PRES/ABSN URINE INCON ASSESS: CPT | Performed by: ORTHOPAEDIC SURGERY

## 2019-12-10 PROCEDURE — G8484 FLU IMMUNIZE NO ADMIN: HCPCS | Performed by: ORTHOPAEDIC SURGERY

## 2019-12-31 NOTE — TELEPHONE ENCOUNTER
Medication: Hydralazine, Celexa  Last visit: 11/18/19  Next visit: 1/13/2020  Last refill: Hydralazine 9/26/19, Celexa 6/27/19  Pharmacy: [facility]

## 2020-01-02 RX ORDER — HYDRALAZINE HYDROCHLORIDE 50 MG/1
50 TABLET, FILM COATED ORAL 3 TIMES DAILY
Qty: 270 TABLET | Refills: 3 | Status: ON HOLD | OUTPATIENT
Start: 2020-01-02 | End: 2020-02-24 | Stop reason: HOSPADM

## 2020-01-02 RX ORDER — CITALOPRAM 10 MG/1
10 TABLET ORAL DAILY
Qty: 90 TABLET | Refills: 3 | Status: SHIPPED | OUTPATIENT
Start: 2020-01-02 | End: 2020-07-20 | Stop reason: SDUPTHER

## 2020-01-02 RX ORDER — FERROUS SULFATE 325(65) MG
325 TABLET ORAL
Qty: 90 TABLET | Refills: 2 | Status: SHIPPED | OUTPATIENT
Start: 2020-01-02 | End: 2020-01-03 | Stop reason: SDUPTHER

## 2020-01-02 NOTE — TELEPHONE ENCOUNTER
Pt called, states med for Iron that was sent at office apt on 12/2/19 she has not received in the mail yet. Writer verified pharmacy with pt. Writer found that note in rx states error in sending electronically that day. Writer did resend rx to pharmacy ok per Dr Nighat Sanchez and pt verbalized understanding.

## 2020-01-03 ENCOUNTER — HOSPITAL ENCOUNTER (OUTPATIENT)
Dept: PAIN MANAGEMENT | Age: 70
Discharge: HOME | End: 2020-01-03
Payer: MEDICARE

## 2020-01-03 VITALS
HEIGHT: 63 IN | DIASTOLIC BLOOD PRESSURE: 74 MMHG | SYSTOLIC BLOOD PRESSURE: 147 MMHG | BODY MASS INDEX: 29.06 KG/M2 | WEIGHT: 164 LBS | RESPIRATION RATE: 16 BRPM | TEMPERATURE: 97.7 F | OXYGEN SATURATION: 97 % | HEART RATE: 69 BPM

## 2020-01-03 PROCEDURE — 99213 OFFICE O/P EST LOW 20 MIN: CPT

## 2020-01-03 PROCEDURE — 99213 OFFICE O/P EST LOW 20 MIN: CPT | Performed by: NURSE PRACTITIONER

## 2020-01-03 RX ORDER — FERROUS SULFATE 325(65) MG
325 TABLET ORAL
Qty: 90 TABLET | Refills: 2 | Status: SHIPPED | OUTPATIENT
Start: 2020-01-03 | End: 2020-07-02 | Stop reason: SDUPTHER

## 2020-01-03 RX ORDER — OXYCODONE AND ACETAMINOPHEN 7.5; 325 MG/1; MG/1
1 TABLET ORAL EVERY 8 HOURS PRN
Qty: 90 TABLET | Refills: 0 | Status: ON HOLD | OUTPATIENT
Start: 2020-01-11 | End: 2020-02-12 | Stop reason: HOSPADM

## 2020-01-03 ASSESSMENT — ENCOUNTER SYMPTOMS
BACK PAIN: 1
RESPIRATORY NEGATIVE: 1
EYES NEGATIVE: 1
GASTROINTESTINAL NEGATIVE: 1

## 2020-01-03 NOTE — PROGRESS NOTES
appropriate analgesic effect of treatment. Miranda Lubin, APRN - CNP)  Review ofOARRS does not show any aberrant prescription behavior. Medication is helping the patient stay active. Patient denies any side effects and reports adequate analgesia. No sign of misuse/abuse. As above, I did review the imaging        When was thelast UDS:    5-14-19         Was the UDS appropriate:yes        Record/Diagnostics Review:       As above, I did review the imaging     5/17/2019 12:40 PM - Luciano, [facility] Incoming Lab Results From [email or web address]      Component Value Ref Range & Units Status Collected Lab   Pain Management Drug Panel Interp, Urine Consistent    Final 05/14/2019  9:00 AM [facility]   (NOTE)   ________________________________________________________________   DRUGS EXPECTED:   HYDROCODONE [5/12/19]   ________________________________________________________________   CONSISTENT with medications provided:   HYDROCODONE : based on the absence of hydrocodone and metabolites   ________________________________________________________________   INTERPRETIVE INFORMATION: [facility]gt [place], [place] Spec/EMIT, Ur,                            Interp   Interpretation depends on accuracy and completeness of patient   medication information submitted by client.     6-Acetylmorphine, Ur Not Detected    Final 05/14/2019  9:00 AM [facility]   7-Aminoclonazepam, Urine Not Detected    Final 05/14/2019  9:00 AM [facility]   Alpha-OH-Alpraz, Urine Not Detected    Final 05/14/2019  9:00 AM [facility]   Alprazolam, Urine Not Detected    Final 05/14/2019  9:00 AM [facility]   Amphetamines, urine Not Detected    Final 05/14/2019  9:00 AM [facility]   Barbiturates, Ur Not Detected    Final 05/14/2019  9:00 AM [facility]   Benzoylecgonine, Ur Not Detected    Final 05/14/2019  9:00 AM [facility]   Buprenorphine Urine Not Detected    Final 05/14/2019  9:00 AM [facility]   Carisoprodol, Ur Not Detected    Final 05/14/2019  9:00 AM [facility]   (NOTE)   The carisoprodol immunoassay has cross-reactivity to 80 MG tablet, Take 0.5 tablets by mouth nightly, Disp: 90 tablet, Rfl: 3    pantoprazole (PROTONIX) 40 MG tablet, Take 1 tablet by mouth 2 times daily (before meals), Disp: 90 tablet, Rfl: 1    furosemide (LASIX) 40 MG tablet, Take 1 tablet by mouth daily, Disp: 90 tablet, Rfl: 2    SITagliptin (JANUVIA) 100 MG tablet, Take 0.5 tablets by mouth daily, Disp: 90 tablet, Rfl: 3    carvedilol (COREG) 12.5 MG tablet, Take 1 tablet by mouth 2 times daily, Disp: 180 tablet, Rfl: 3    Calcium Carbonate-Vitamin D (CALCIUM 500/D) 500-125 MG-UNIT TABS, Take 1 tablet by mouth 2 times daily , Disp: , Rfl:     ferrous sulfate 325 (65 Fe) MG tablet, Take 1 tablet by mouth daily (with breakfast), Disp: 90 tablet, Rfl: 2    Lancets MISC, 1 each by Does not apply route daily, Disp: 100 each, Rfl: 3    blood glucose monitor strips, Test 2 times a day & as needed for symptoms of irregular blood glucose., Disp: 100 strip, Rfl: 5    ondansetron (ZOFRAN) 4 MG tablet, Take 1 tablet by mouth daily as needed for Nausea or Vomiting, Disp: 30 tablet, Rfl: 0    lidocaine (XYLOCAINE) 5 % ointment, 4-5 times a day to your right thigh for pain., Disp: 240 g, Rfl: 3    nitroGLYCERIN (NITROSTAT) 0.4 MG SL tablet, Place 1 tablet under the tongue every 5 minutes as needed for Chest pain, Disp: 75 tablet, Rfl: 3    Family History   Problem Relation Age of Onset    Stroke Mother     Diabetes Mother     Heart Disease Mother     High Blood Pressure Mother     Heart Disease Father     Heart Disease Brother     High Blood Pressure Brother     Heart Disease Maternal Grandmother     High Blood Pressure Sister        Social History     Socioeconomic History    Marital status:      Spouse name: Not on file    Number of children: Not on file    Years of education: Not on file    Highest education level: Not on file   Occupational History    Occupation: retired   Social Needs    Financial resource strain: Not on file   [facility] insecurity:     Worry: Not on file     Inability: Not on file    Transportation needs:     Medical: Not on file     Non-medical: Not on file   Tobacco Use    Smoking status: Former Smoker     Packs/day: 0.50     Years: 20.00     Pack years: 10.00     Types: Cigarettes     Start date: 1995     Last attempt to quit: 2017     Years since quittin.5    Smokeless tobacco: Never Used    Tobacco comment: 17 quit 10 days ago   Substance and Sexual Activity    Alcohol use: No     Alcohol/week: 0.0 standard drinks     Comment: occasionally    Drug use: No    Sexual activity: Not on file   Lifestyle    Physical activity:     Days per week: Not on file     Minutes per session: Not on file    Stress: Not on file   Relationships    Social connections:     Talks on phone: Not on file     Gets together: Not on file     Attends Muslim service: Not on file     Active member of club or organization: Not on file     Attends meetings of clubs or organizations: Not on file     Relationship status: Not on file    Intimate partner violence:     Fear of current or ex partner: Not on file     Emotionally abused: Not on file     Physically abused: Not on file     Forced sexual activity: Not on file   Other Topics Concern    Not on file   Social History Narrative    Not on file       Review of Systems:  Review of Systems   Constitution: Negative. HENT: Negative. Eyes: Negative. Cardiovascular: Negative. Respiratory: Negative. Endocrine: Negative. On januvia   Hematologic/Lymphatic: Bruises/bleeds easily. Skin: Negative. Musculoskeletal: Positive for back pain, falls and joint pain. Gastrointestinal: Negative. Genitourinary: Negative. Neurological: Positive for loss of balance, numbness and weakness. Uses a cane    Psychiatric/Behavioral: Negative.           Physical Exam:  BP (!) 147/74   Pulse 69   Temp 97.7 °F (36.5 °C) (Oral)   Resp 16   Ht 5' 3\" (1.6 m)   Wt 164 lb (74.4 kg)   SpO2 97%   BMI 29.05 kg/m²     Physical Exam  HENT:      Head: Normocephalic. Pulmonary:      Effort: Pulmonary effort is normal.   Musculoskeletal:      Lumbar back: She exhibits decreased range of motion and tenderness. Skin:     General: Skin is warm and dry. Neurological:      Mental Status: She is alert. Gait: Gait abnormal.      Deep Tendon Reflexes:      Reflex Scores:       Patellar reflexes are 1+ on the right side and 1+ on the left side. Achilles reflexes are 1+ on the right side and 1+ on the left side. Comments: Ambulates with a cane   Psychiatric:         Attention and Perception: Attention normal.         Mood and Affect: Mood normal.         Speech: Speech normal.         Behavior: Behavior normal.         Thought Content: Thought content normal.         Cognition and Memory: Cognition normal.         Judgment: Judgment normal.           Assessment:      Problem List Items Addressed This Visit     Spondylosis of lumbar region without myelopathy or radiculopathy - Primary    Relevant Medications    oxyCODONE-acetaminophen (PERCOCET) 7.5-325 MG per tablet (Start on 1/11/2020)    Lumbar facet arthropathy    Relevant Medications    oxyCODONE-acetaminophen (PERCOCET) 7.5-325 MG per tablet (Start on 1/11/2020)    Lumbar degenerative disc disease    Relevant Medications    oxyCODONE-acetaminophen (PERCOCET) 7.5-325 MG per tablet (Start on 1/11/2020)    Lumbago    Relevant Medications    oxyCODONE-acetaminophen (PERCOCET) 7.5-325 MG per tablet (Start on 1/11/2020)    Facet arthritis of lumbar region    Relevant Medications    oxyCODONE-acetaminophen (PERCOCET) 7.5-325 MG per tablet (Start on 1/11/2020)          Treatment Plan:  DISCUSSION: Treatment options discussed withpatient and all questions answered to patient's satisfaction.      Possible side effects, risk of tolerance and or dependence and alternative treatments discussed    Obtaining appropriate analgesic effect of treatment   No signs of potential drug abuse or diversion identified    [x] Ill effects of being on chronic pain medications such as sleep disturbances, respiratory depression, hormonal changes, withdrawal symptoms, chronic opioid dependence and tolerance as well as risk of taking opioids with Benzodiazepines and taking opioids along with alcohol,  werediscussed with patient. I had asked the patient to minimize medication use and utilize pain medications only for uncontrolled rest pain or pain with exertional activities. I advised patient not to self-escalate painmedications without consulting with us. At each of patient's future visits we will try to taper pain medications, while adjusting the adjunct medications, and re-evaluating for Physical Therapy to improve spinal andjoint strength. We will continue to have discussions to decrease pain medications as tolerated. Counseled patient on effects their pain medication and /or their medical condition mayhave on their  ability to drive or operate machinery. Instructed not to drive or operate machinery if drowsy     I also discussed with the patient regarding the dangers of combining narcotic pain medication with tranquilizers, alcohol or illegal drugs or taking the medication any way other than prescribed. The dangers were discussed  including respiratory depression and death. Patient was told to tell  all  physicians regarding the medications he is getting from pain clinic. Patient is warned not to take any unprescribed medications over-the-countermedications that can depress breathing . Patient is advised to talk to the pharmacist or physicians if planning to take any over-the-counter medications before  takeing them.  Patient is strongly advised to avoid tranquilizers or  relaxants, illegal drugs  or any medications that can depress breathing  Patient is also advised to tell us if there is any changes in their medications from other

## 2020-01-06 NOTE — TELEPHONE ENCOUNTER
Pt's rx still did not go thru electronically. Did call the mail order pharmacy. They state that the med is over the counter and that is why it is not going thru. States that patient will need to purchase OTC med. Did leave pt a vm with this info and that she will need to buy.

## 2020-01-07 ENCOUNTER — OFFICE VISIT (OUTPATIENT)
Dept: ORTHOPEDIC SURGERY | Age: 70
End: 2020-01-07
Payer: MEDICARE

## 2020-01-07 PROCEDURE — 1036F TOBACCO NON-USER: CPT | Performed by: ORTHOPAEDIC SURGERY

## 2020-01-07 PROCEDURE — G8417 CALC BMI ABV UP PARAM F/U: HCPCS | Performed by: ORTHOPAEDIC SURGERY

## 2020-01-07 PROCEDURE — G8427 DOCREV CUR MEDS BY ELIG CLIN: HCPCS | Performed by: ORTHOPAEDIC SURGERY

## 2020-01-07 PROCEDURE — 1090F PRES/ABSN URINE INCON ASSESS: CPT | Performed by: ORTHOPAEDIC SURGERY

## 2020-01-07 PROCEDURE — G8399 PT W/DXA RESULTS DOCUMENT: HCPCS | Performed by: ORTHOPAEDIC SURGERY

## 2020-01-07 PROCEDURE — 3017F COLORECTAL CA SCREEN DOC REV: CPT | Performed by: ORTHOPAEDIC SURGERY

## 2020-01-07 PROCEDURE — G8484 FLU IMMUNIZE NO ADMIN: HCPCS | Performed by: ORTHOPAEDIC SURGERY

## 2020-01-07 PROCEDURE — 1123F ACP DISCUSS/DSCN MKR DOCD: CPT | Performed by: ORTHOPAEDIC SURGERY

## 2020-01-07 PROCEDURE — 4040F PNEUMOC VAC/ADMIN/RCVD: CPT | Performed by: ORTHOPAEDIC SURGERY

## 2020-01-07 PROCEDURE — 99213 OFFICE O/P EST LOW 20 MIN: CPT | Performed by: ORTHOPAEDIC SURGERY

## 2020-01-07 NOTE — PROGRESS NOTES
Patient ID: Rober La is a 77 y.o. female    Chief Compliant:  Chief Complaint   Patient presents with    Follow-up     low back pain         History of Present Illness: This is a 77 y.o. female who presents to the clinic today for follow up of chronic low back pain. Refer to previous clinic notes for details. Per last visit we discussed risks, benefits, expected outcome, and recovery for possible L4-5 PLIF. She has not improved since last visit she is ready to have something done. Review of Systems    Constitutional: Negative for fever, sweats, weight loss, recent injury, or recent illness  Neurological: Negative for  headaches, numbness, or focal weakness. Integumentary: Negative for abrasion, laceration, rash, ecchymosis. Musculoskeletal: Positive for Follow-up (low back pain )         Physical Exam:  Constitutional: Patient is oriented to person, place, and time. Patient appears well-developed and well nourished. HENT: Negative otherwise noted  Head: Normocephalic and Atraumatic  Nose: Normal  Eyes: Conjunctivae and EOM are normal  Neck: Normal range of motion Neck supple. Respiratory/Cardio: Effort normal. No respiratory distress. Musculoskeletal: Walks with cane. Neurological: Patient is alert and oriented to person, place, and time. Normal strenght. No sensory deficit. Skin: Skin is warm and dry  Psychiatric: Behavior is normal. Thought content normal.  Nursing note and vitals reviewed. Past History:    Current Outpatient Medications:     ferrous sulfate 325 (65 Fe) MG tablet, Take 1 tablet by mouth daily (with breakfast), Disp: 90 tablet, Rfl: 2    VITAMIN D, ERGOCALCIFEROL, PO, Take by mouth, Disp: , Rfl:     [START ON 1/11/2020] oxyCODONE-acetaminophen (PERCOCET) 7.5-325 MG per tablet, Take 1 tablet by mouth every 8 hours as needed for Pain for up to 30 days. , Disp: 90 tablet, Rfl: 0    hydrALAZINE (APRESOLINE) 50 MG tablet, Take 1 tablet by mouth 3 times daily, Disp: 270 Years of education: Not on file    Highest education level: Not on file   Occupational History    Occupation: retired   Social Needs    Financial resource strain: Not on file    Food insecurity:     Worry: Not on file     Inability: Not on file   Kiko needs:     Medical: Not on file     Non-medical: Not on file   Tobacco Use    Smoking status: Former Smoker     Packs/day: 0.50     Years: 20.00     Pack years: 10.00     Types: Cigarettes     Start date: 1995     Last attempt to quit: 2017     Years since quittin.5    Smokeless tobacco: Never Used    Tobacco comment: 17 quit 10 days ago   Substance and Sexual Activity    Alcohol use: No     Alcohol/week: 0.0 standard drinks     Comment: occasionally    Drug use: No    Sexual activity: Not on file   Lifestyle    Physical activity:     Days per week: Not on file     Minutes per session: Not on file    Stress: Not on file   Relationships    Social connections:     Talks on phone: Not on file     Gets together: Not on file     Attends Sikh service: Not on file     Active member of club or organization: Not on file     Attends meetings of clubs or organizations: Not on file     Relationship status: Not on file    Intimate partner violence:     Fear of current or ex partner: Not on file     Emotionally abused: Not on file     Physically abused: Not on file     Forced sexual activity: Not on file   Other Topics Concern    Not on file   Social History Narrative    Not on file     Past Medical History:   Diagnosis Date    Allergic rhinitis, cause unspecified     Back pain     lumbar    Bowel obstruction ([facility] [address]     history of due to scar tissue, resolved non-surgically    C. difficile diarrhea     CAD (coronary artery disease)     Cellulitis     left leg    Cellulitis 2017    leg left leg/bug bite    Diverticulosis of colon (without mention of hemorrhage)     GERD (gastroesophageal reflux disease)     History  Heart Disease Brother     High Blood Pressure Brother     Heart Disease Maternal Grandmother     High Blood Pressure Sister           Labs and Imaging:     XR taken today:      Assessment and Plan:  1. Acquired spondylolisthesis    2. Lumbar facet arthropathy    3. Spondylosis of lumbar region without myelopathy or radiculopathy    4. Lumbar degenerative disc disease    5. Chronic left-sided low back pain with left-sided sciatica    6. Spinal stenosis of lumbar region with neurogenic claudication        This is a 77 y.o. female who presents to the clinic today for follow up of chronic low back pain, not improved. Risk benefits expected outcome, recovery discussed again with patient. Addressed any other questions at that time. Schedule L4-5 PLIF with screw argumentation at L4-5      Scribe Attestation:  By signing my name below, I, Josephine Yepez, attest that this documentation has been prepared under the direction and in the presence of Dr. Gordo Lyons. Electronically signed: Wil Balderas, 1/7/20     Please note that this chart was generated using voice recognition Dragon dictation software. Although every effort was made to ensure the accuracy of this automated transcription, some errors in transcription may have occurred.

## 2020-01-13 ENCOUNTER — OFFICE VISIT (OUTPATIENT)
Dept: INTERNAL MEDICINE CLINIC | Age: 70
End: 2020-01-13
Payer: MEDICARE

## 2020-01-13 VITALS
SYSTOLIC BLOOD PRESSURE: 136 MMHG | HEIGHT: 63 IN | HEART RATE: 70 BPM | BODY MASS INDEX: 28.88 KG/M2 | OXYGEN SATURATION: 98 % | DIASTOLIC BLOOD PRESSURE: 74 MMHG | WEIGHT: 163 LBS

## 2020-01-13 PROCEDURE — 3017F COLORECTAL CA SCREEN DOC REV: CPT | Performed by: INTERNAL MEDICINE

## 2020-01-13 PROCEDURE — 1036F TOBACCO NON-USER: CPT | Performed by: INTERNAL MEDICINE

## 2020-01-13 PROCEDURE — 1090F PRES/ABSN URINE INCON ASSESS: CPT | Performed by: INTERNAL MEDICINE

## 2020-01-13 PROCEDURE — 4040F PNEUMOC VAC/ADMIN/RCVD: CPT | Performed by: INTERNAL MEDICINE

## 2020-01-13 PROCEDURE — 2022F DILAT RTA XM EVC RTNOPTHY: CPT | Performed by: INTERNAL MEDICINE

## 2020-01-13 PROCEDURE — G8399 PT W/DXA RESULTS DOCUMENT: HCPCS | Performed by: INTERNAL MEDICINE

## 2020-01-13 PROCEDURE — 99214 OFFICE O/P EST MOD 30 MIN: CPT | Performed by: INTERNAL MEDICINE

## 2020-01-13 PROCEDURE — 3046F HEMOGLOBIN A1C LEVEL >9.0%: CPT | Performed by: INTERNAL MEDICINE

## 2020-01-13 PROCEDURE — G8484 FLU IMMUNIZE NO ADMIN: HCPCS | Performed by: INTERNAL MEDICINE

## 2020-01-13 PROCEDURE — 1123F ACP DISCUSS/DSCN MKR DOCD: CPT | Performed by: INTERNAL MEDICINE

## 2020-01-13 PROCEDURE — G8427 DOCREV CUR MEDS BY ELIG CLIN: HCPCS | Performed by: INTERNAL MEDICINE

## 2020-01-13 PROCEDURE — G8417 CALC BMI ABV UP PARAM F/U: HCPCS | Performed by: INTERNAL MEDICINE

## 2020-01-13 NOTE — PROGRESS NOTES
Subjective:      Chief Complaint   Patient presents with    Back Pain     Pt states she will be having back surgery on Feb 10, 2020    Depression     [facility], Pt states she has been having more depression due to back pain but other than that she has been ok     Diabetes     [facility], Pt states she has been checking BS daily, BS been staying under 130        Patient ID: Nataly Pa is a 77 y.o. female. Visit Information    Have you changed or started any medications since your last visit including any over-the-counter medicines, vitamins, or herbal medicines? no   Are you having any side effects from any of your medications? -  no  Have you stopped taking any of your medications? Is so, why? -  no    Have you seen any other physician or provider since your last visit? Yes - Records Obtained  Have you had any other diagnostic tests since your last visit? No  Have you been seen in the emergency room and/or had an admission to a hospital since we last saw you? No  Have you had your routine dental cleaning in the past 6 months? no    Have you activated your [employer] account? If not, what are your barriers?  Yes     Patient Care Team:  Sunita Donnelly MD as PCP - General (Internal Medicine)  Sun[facility] Donnelly [facility]  Meli Fairchild MD as Consulting Physician (Gastroenterology)    Medical History Review  Past Medical, Family, and Social History reviewed and does not contribute to the patient presenting condition    Health Maintenance   Topic Date Due    Diabetic foot exam  01/18/1961    Diabetic retinal exam  01/18/1961    DTaP/Tdap/Td vaccine (1 - Tdap) 01/18/1962    Annual Wellness Visit (AWV)  05/29/2019    Breast cancer screen  08/04/2019    Flu vaccine (1) 09/01/2019    Shingles Vaccine (1 of 2) 10/10/2020 (Originally 1/18/2001)    Lipid screen  05/20/2020    A1C test (Diabetic or Prediabetic)  10/02/2020    Potassium monitoring  10/02/2020    Creatinine monitoring  10/07/2020  Colon cancer screen colonoscopy  10/07/2026    DEXA (modify frequency per FRAX score)  Completed    Pneumococcal 65+ years Vaccine  Completed    Hepatitis C screen  Completed       HPI- Patient is here for evaluation of Multiple medical Problems , she has DM, HTN, Chronic Back pain , Going for surgery on Feb, 10 . She has Difficulty in walking , standing  for long time . She is compliant with her medication   She follow with Pain management , on Percocet , lidocaine cream , Mobic , had Multiple back injections , Had PT . Follows with Dr Nora Balderrama   Swelling in legs has Improved with Compression stocking   Follows with Dr Guzman Collado for COLEEN     Review of Systems   Constitutional: Negative for activity change, appetite change, chills, diaphoresis, fatigue and fever. HENT: Negative for congestion, dental problem, drooling and ear discharge. Eyes: Negative for pain, discharge, redness and itching. Respiratory: Negative for apnea, cough, choking, chest tightness and shortness of breath. Cardiovascular: Negative for chest pain and leg swelling. Gastrointestinal: Negative for abdominal distention, abdominal pain, blood in stool, constipation and diarrhea. Endocrine: Negative for cold intolerance and heat intolerance. Genitourinary: Negative for difficulty urinating, dysuria, enuresis, flank pain and frequency. Musculoskeletal: Positive for arthralgias and back pain. Negative for gait problem and joint swelling. Skin: Negative for color change, pallor and rash. Neurological: Negative for dizziness, facial asymmetry, light-headedness, numbness and headaches. Psychiatric/Behavioral: Positive for dysphoric mood. Negative for agitation, behavioral problems, confusion and decreased concentration. Objective:   Physical Exam  Constitutional:       Appearance: She is well-developed. She is not diaphoretic. HENT:      Head: Normocephalic and atraumatic.       Mouth/Throat:      Pharynx: No oropharyngeal exudate. Eyes:      General: No scleral icterus. Right eye: No discharge. Left eye: No discharge. Conjunctiva/sclera: Conjunctivae normal.      Pupils: Pupils are equal, round, and reactive to light. Neck:      Musculoskeletal: Normal range of motion and neck supple. Thyroid: No thyromegaly. Vascular: No JVD. Trachea: No tracheal deviation. Cardiovascular:      Rate and Rhythm: Normal rate. Heart sounds: Normal heart sounds. No murmur. No gallop. Pulmonary:      Effort: Pulmonary effort is normal. No respiratory distress. Breath sounds: Normal breath sounds. No stridor. No wheezing or rales. Chest:      Chest wall: No tenderness. Abdominal:      General: Bowel sounds are normal. There is no distension. Palpations: Abdomen is soft. Tenderness: There is no tenderness. There is no guarding or rebound. Musculoskeletal: Normal range of motion. General: Tenderness (Lower back) present. Neurological:      Mental Status: She is alert and oriented to person, place, and time. Assessment / Plan:   1. Depression, unspecified depression type  On Celexa      2. Hypertension, unspecified type  Controlled    3. Type 2 diabetes mellitus with other circulatory complication, without long-term current use of insulin (HCC)  Last HbA1c is 5.5    4. Essential hypertension      5. Stage 3 chronic kidney disease (HCC)  Creatinine has improved to 1.26 from 1.55  - Comprehensive Metabolic Panel; Future  - CBC; Future    6. Spinal stenosis of lumbar region with neurogenic claudication  Follows with pain management      · Return in about 4 months (around 5/13/2020). · Reviewed prior labs and health maintenance. · Discussed use, benefit, and side effects of prescribed medications. Barriers to medication compliance addressed. All patient questions answered. Pt voiced understanding.          MD GENA Stacy [facility]  1/24/2020, 4:26

## 2020-01-20 RX ORDER — FUROSEMIDE 40 MG/1
40 TABLET ORAL DAILY
Qty: 90 TABLET | Refills: 2 | Status: ON HOLD | OUTPATIENT
Start: 2020-01-20 | End: 2020-02-24 | Stop reason: HOSPADM

## 2020-01-24 ASSESSMENT — ENCOUNTER SYMPTOMS
CONSTIPATION: 0
EYE REDNESS: 0
EYE ITCHING: 0
BACK PAIN: 1
APNEA: 0
DIARRHEA: 0
COLOR CHANGE: 0
CHOKING: 0
EYE PAIN: 0
CHEST TIGHTNESS: 0
EYE DISCHARGE: 0
SHORTNESS OF BREATH: 0
BLOOD IN STOOL: 0
ABDOMINAL PAIN: 0
COUGH: 0
ABDOMINAL DISTENTION: 0

## 2020-01-27 ENCOUNTER — ANESTHESIA EVENT (OUTPATIENT)
Dept: OPERATING ROOM | Age: 70
End: 2020-01-27
Payer: MEDICARE

## 2020-01-27 ENCOUNTER — HOSPITAL ENCOUNTER (OUTPATIENT)
Age: 70
Setting detail: SPECIMEN
Discharge: HOME | End: 2020-01-27
Payer: MEDICARE

## 2020-01-27 ENCOUNTER — HOSPITAL ENCOUNTER (OUTPATIENT)
Dept: PREADMISSION TESTING | Age: 70
Discharge: HOME | End: 2020-01-31
Payer: MEDICARE

## 2020-01-27 VITALS
RESPIRATION RATE: 16 BRPM | OXYGEN SATURATION: 100 % | WEIGHT: 163 LBS | DIASTOLIC BLOOD PRESSURE: 66 MMHG | BODY MASS INDEX: 28.88 KG/M2 | HEART RATE: 66 BPM | HEIGHT: 63 IN | TEMPERATURE: 98.6 F | SYSTOLIC BLOOD PRESSURE: 156 MMHG

## 2020-01-27 LAB
ABSOLUTE EOS #: 0.4 K/UL (ref 0–0.4)
ABSOLUTE IMMATURE GRANULOCYTE: ABNORMAL K/UL (ref 0–0.3)
ABSOLUTE LYMPH #: 1.4 K/UL (ref 1–4.8)
ABSOLUTE MONO #: 0.6 K/UL (ref 0.1–1.3)
ALBUMIN SERPL-MCNC: 4.5 G/DL (ref 3.5–5.2)
ALBUMIN/GLOBULIN RATIO: ABNORMAL (ref 1–2.5)
ALP BLD-CCNC: 39 U/L (ref 35–104)
ALT SERPL-CCNC: 17 U/L (ref 5–33)
ANION GAP SERPL CALCULATED.3IONS-SCNC: 14 MMOL/L (ref 9–17)
ANION GAP SERPL CALCULATED.3IONS-SCNC: 14 MMOL/L (ref 9–17)
AST SERPL-CCNC: 22 U/L
BASOPHILS # BLD: 1 % (ref 0–2)
BASOPHILS ABSOLUTE: 0.1 K/UL (ref 0–0.2)
BILIRUB SERPL-MCNC: 0.31 MG/DL (ref 0.3–1.2)
BUN BLDV-MCNC: 34 MG/DL (ref 8–23)
BUN BLDV-MCNC: 34 MG/DL (ref 8–23)
BUN/CREAT BLD: ABNORMAL (ref 9–20)
BUN/CREAT BLD: ABNORMAL (ref 9–20)
CALCIUM SERPL-MCNC: 9.4 MG/DL (ref 8.6–10.4)
CALCIUM SERPL-MCNC: 9.4 MG/DL (ref 8.6–10.4)
CHLORIDE BLD-SCNC: 104 MMOL/L (ref 98–107)
CHLORIDE BLD-SCNC: 104 MMOL/L (ref 98–107)
CO2: 24 MMOL/L (ref 20–31)
CO2: 24 MMOL/L (ref 20–31)
CREAT SERPL-MCNC: 1.14 MG/DL (ref 0.5–0.9)
CREAT SERPL-MCNC: 1.14 MG/DL (ref 0.5–0.9)
DIFFERENTIAL TYPE: ABNORMAL
EOSINOPHILS RELATIVE PERCENT: 7 % (ref 0–4)
GFR AFRICAN AMERICAN: 57 ML/MIN
GFR AFRICAN AMERICAN: 57 ML/MIN
GFR NON-AFRICAN AMERICAN: 47 ML/MIN
GFR NON-AFRICAN AMERICAN: 47 ML/MIN
GFR SERPL CREATININE-BSD FRML MDRD: ABNORMAL ML/MIN/{1.73_M2}
GLUCOSE BLD-MCNC: 117 MG/DL (ref 70–99)
GLUCOSE BLD-MCNC: 117 MG/DL (ref 70–99)
HCT VFR BLD CALC: 34.7 % (ref 36–46)
HCT VFR BLD CALC: 34.7 % (ref 36–46)
HEMOGLOBIN: 11.3 G/DL (ref 12–16)
HEMOGLOBIN: 11.3 G/DL (ref 12–16)
IMMATURE GRANULOCYTES: ABNORMAL %
LYMPHOCYTES # BLD: 24 % (ref 24–44)
MCH RBC QN AUTO: 30.3 PG (ref 26–34)
MCH RBC QN AUTO: 30.3 PG (ref 26–34)
MCHC RBC AUTO-ENTMCNC: 32.5 G/DL (ref 31–37)
MCHC RBC AUTO-ENTMCNC: 32.5 G/DL (ref 31–37)
MCV RBC AUTO: 93.3 FL (ref 80–100)
MCV RBC AUTO: 93.3 FL (ref 80–100)
MONOCYTES # BLD: 10 % (ref 1–7)
NRBC AUTOMATED: ABNORMAL PER 100 WBC
NRBC AUTOMATED: ABNORMAL PER 100 WBC
PDW BLD-RTO: 14.4 % (ref 11.5–14.9)
PDW BLD-RTO: 14.4 % (ref 11.5–14.9)
PLATELET # BLD: 316 K/UL (ref 150–450)
PLATELET # BLD: 316 K/UL (ref 150–450)
PLATELET ESTIMATE: ABNORMAL
PMV BLD AUTO: 8.4 FL (ref 6–12)
PMV BLD AUTO: 8.4 FL (ref 6–12)
POTASSIUM SERPL-SCNC: 4.4 MMOL/L (ref 3.7–5.3)
POTASSIUM SERPL-SCNC: 4.4 MMOL/L (ref 3.7–5.3)
RBC # BLD: 3.72 M/UL (ref 4–5.2)
RBC # BLD: 3.72 M/UL (ref 4–5.2)
RBC # BLD: ABNORMAL 10*6/UL
SEG NEUTROPHILS: 58 % (ref 36–66)
SEGMENTED NEUTROPHILS ABSOLUTE COUNT: 3.4 K/UL (ref 1.3–9.1)
SODIUM BLD-SCNC: 142 MMOL/L (ref 135–144)
SODIUM BLD-SCNC: 142 MMOL/L (ref 135–144)
TOTAL PROTEIN: 7.2 G/DL (ref 6.4–8.3)
WBC # BLD: 5.9 K/UL (ref 3.5–11)
WBC # BLD: 5.9 K/UL (ref 3.5–11)
WBC # BLD: ABNORMAL 10*3/UL

## 2020-01-27 PROCEDURE — 85025 COMPLETE CBC W/AUTO DIFF WBC: CPT

## 2020-01-27 PROCEDURE — 80048 BASIC METABOLIC PNL TOTAL CA: CPT

## 2020-01-27 PROCEDURE — 80053 COMPREHEN METABOLIC PANEL: CPT

## 2020-01-27 PROCEDURE — 85027 COMPLETE CBC AUTOMATED: CPT

## 2020-01-27 PROCEDURE — 36415 COLL VENOUS BLD VENIPUNCTURE: CPT

## 2020-01-27 NOTE — H&P (VIEW-ONLY)
HISTORY and [id number]       NAME:  Elizabeth Najera  MRN: [id number]   YOB: 1950   Date: 1/27/2020   Age: 72 y.o. Gender: female       COMPLAINT AND PRESENT HISTORY:     Elizabeth Najera is 72 y.o.,  female, undergoing preadmission testing for Spondylolisthesis with scheduled Lumbar L4-L5 Posterior Decompression/ Fusion. Pt has lumbar degenerative disc disease. The initial symptoms started 4 years ago. 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 Pain described as constant, aching rating 5/10. Pain is aggravated with standing. Sitting relieves pain. Pt c/o of pain in the lumbar spine area, radiates to the lower limbs worse on the left side. Pt reports pain radiates down left thigh to knee and down right thigh causing tingling. Pt reports difficulty with ambulation due to back pain and \"balance issues\". Uses a cane for ambulation. Has sustained multiple falls with last fall being within last week. No redness, swelling or rashes. Pt has tried injections, physical therapy and NSAIDS. She can no longer take NSAIDS as she developed a bleeding ulcer and required a blood transfusion in August of 2018. Pt currently goes to pain management and has been on Percocet with minimal relief of back pain. Pt has post operative nausea and vomiting. Denies chest pain/pressure, palpitations, SOB, recent URI, nausea, vomiting, diarrhea, constipation, fever or chills.      PAST MEDICAL HISTORY     Past Medical History:   Diagnosis Date    Allergic rhinitis, cause unspecified     Back pain     lumbar    Bowel obstruction (HCC)     history of due to scar tissue, resolved non-surgically    C. difficile diarrhea     CAD (coronary artery disease)     Cellulitis     left leg    Cellulitis 2017 August    leg left leg/bug bite    Diverticulosis of colon (without mention of hemorrhage)     GERD (gastroesophageal reflux disease)     History of MI (myocardial infarction) 2005 thought due to a blood clot    History of ovarian cyst 1970    had oopherectomy    History of peritonitis 1968    due to ruptured appendix age 16    HTN (hypertension)     Hx of blood clots     right leg    Hyperlipidemia     Intestinal or peritoneal adhesions with obstruction (postoperative) (postinfection) ([address]     Kidney infection     renal failure/sepsis/spider bite    Lateral epicondylitis  of elbow     Muscle strain     right posterior shoulder    Other abnormal glucose     PONV (postoperative nausea and vomiting)     dry heaves    Pre-diabetes     Restless legs syndrome (RLS)     TIA (transient ischemic attack) 2014    Vitamin D deficiency     Wears glasses      Pt denies any history of stroke, COPD, Asthma, Cancer, Seizures,Thyroid disease, Kidney Disease, Hepatitis, TB, Psychiatric Disorders or Substance abuse.     SURGICAL HISTORY       Past Surgical History:   Procedure Laterality Date    ABDOMEN SURGERY  1976    benign tumor removed near remaining ovary, 1.5 pounds    APPENDECTOMY  1968    appendix ruptured, developed peritonitis    BUNIONECTOMY Left     along with calcium deposits removed   [address]  2005    negative    COLECTOMY  1969    12 INCHES REMOVED D/T OBSTRUCTION    COLONOSCOPY      CYST REMOVAL Right     right facial    HYSTERECTOMY  1973    taken as a result of recurring cysts    OTHER SURGICAL HISTORY  8/14/14    FESS    OVARY REMOVAL  1970    UNILATERAL due to cyst    OVARY REMOVAL  1971    partial, due to cyst    SINUS SURGERY  2004    UPPER GASTROINTESTINAL ENDOSCOPY N/A 5/31/2019    EGD ESOPHAGOGASTRODUODENOSCOPY performed by Diandra Flor MD at [facility] 8/5/2019    EGD BIOPSY performed by Mariela Acosta MD at [facility] 8/23/2019    EGD BIOPSY performed by Diandra Flor MD at [address] [place] [address] 3/5/2019 on file   Social History Narrative    Not on file         REVIEW OF SYSTEMS      Allergies   Allergen Reactions    Bactrim [Sulfamethoxazole-Trimethoprim] Other (See Comments)     sepsis    Codeine Itching    Seasonal        Current Outpatient Medications on File Prior to Encounter   Medication Sig Dispense Refill    furosemide (LASIX) 40 MG tablet Take 1 tablet by mouth daily 90 tablet 2    ferrous sulfate 325 (65 Fe) MG tablet Take 1 tablet by mouth daily (with breakfast) 90 tablet 2    VITAMIN D, ERGOCALCIFEROL, PO Take by mouth      oxyCODONE-acetaminophen (PERCOCET) 7.5-325 MG per tablet Take 1 tablet by mouth every 8 hours as needed for Pain for up to 30 days. 90 tablet 0    hydrALAZINE (APRESOLINE) 50 MG tablet Take 1 tablet by mouth 3 times daily 270 tablet 3    citalopram (CELEXA) 10 MG tablet Take 1 tablet by mouth daily 90 tablet 3    fenofibrate micronized (LOFIBRA) 134 MG capsule Take 1 capsule by mouth every morning (before breakfast) 90 capsule 3    lisinopril (PRINIVIL;ZESTRIL) 10 MG tablet Take 1 tablet by mouth daily 90 tablet 3    pramipexole (MIRAPEX) 1 MG tablet Take 1.5 tablets by mouth daily 145 tablet 3    atorvastatin (LIPITOR) 80 MG tablet Take 0.5 tablets by mouth nightly 90 tablet 3    pantoprazole (PROTONIX) 40 MG tablet Take 1 tablet by mouth 2 times daily (before meals) 90 tablet 1    SITagliptin (JANUVIA) 100 MG tablet Take 0.5 tablets by mouth daily 90 tablet 3    ondansetron (ZOFRAN) 4 MG tablet Take 1 tablet by mouth daily as needed for Nausea or Vomiting 30 tablet 0    carvedilol (COREG) 12.5 MG tablet Take 1 tablet by mouth 2 times daily 180 tablet 3    lidocaine (XYLOCAINE) 5 % ointment 4-5 times a day to your right thigh for pain.  240 g 3    nitroGLYCERIN (NITROSTAT) 0.4 MG SL tablet Place 1 tablet under the tongue every 5 minutes as needed for Chest pain 75 tablet 3    Calcium Carbonate-Vitamin D (CALCIUM 500/D) 500-125 MG-UNIT TABS Take 1 tablet by mouth gallops. LUNGS:  Equal on expansion, normal breath sounds. No adventitious sounds. ABDOMEN:  Soft on palpation. No localized tenderness. No guarding or rigidity. No palpable hepatosplenomegaly. LYMPHATICS:  No palpable cervical lymphadenopathy. LOCOMOTOR, BACK AND SPINE:  Using cane for ambulation. Tenderness to palpation to lumbar back. Range of motion is limited due to pain. No deformities. EXTREMITIES:  Symmetrical, no pretibial edema. No calf tenderness. No discoloration or ulcerations. NEUROLOGIC:  The patient is conscious, alert, oriented, Cranial nerve II-XII intact, taste and smell were not examined. No apparent focal sensory or motor deficits.                                                                                      PROVISIONAL DIAGNOSES / SURGERY:      SPONDYLOLISTHESIS    LUMBAR L4-5 POSTERIOR  DECOMPRESSION INSTRUMENTATION FUSION [address] AUGMENTATION/    Patient Active Problem List    Diagnosis Date Noted    CVA (cerebral vascular accident) ([address] 08/05/2015     Priority: High    BIN (acute kidney injury) ([address] 08/05/2015     Priority: High    Leg swelling 10/15/2019    Acquired spondylolisthesis 09/17/2019    Spinal stenosis of lumbar region with neurogenic claudication 09/17/2019    Acute deep vein thrombosis (DVT) of proximal vein of left lower extremity ([address] 09/17/2019    Clostridium difficile infection 08/25/2019    Acute renal failure ([address]     Melena 08/22/2019    PUD (peptic ulcer disease)     Absolute anemia     Acute blood loss anemia     Peptic ulcer 08/07/2019    GI bleed 08/03/2019    Closed head injury     Scalp laceration     Abnormal finding on imaging     Other irritable bowel syndrome     Frequent falls     Double vision     Muscle soreness     Iron deficiency anemia secondary to inadequate dietary iron intake 05/23/2019    Diarrhea due to malabsorption 05/23/2019    Stage 3 chronic kidney epicondylitis     Allergic rhinitis     Vitamin D deficiency            MAIK Martin - CNP on 1/27/2020 at 10:16 AM

## 2020-01-27 NOTE — H&P
HISTORY and [id number]       NAME:  Jenna Lunsford  MRN: [id number]   YOB: 1950   Date: 1/27/2020   Age: 72 y.o. Gender: female       COMPLAINT AND PRESENT HISTORY:     Jenna Lunsford is 72 y.o.,  female, undergoing preadmission testing for Spondylolisthesis with scheduled Lumbar L4-L5 Posterior Decompression/ Fusion. Pt has lumbar degenerative disc disease. The initial symptoms started 4 years ago. 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 Pain described as constant, aching rating 5/10. Pain is aggravated with standing. Sitting relieves pain. Pt c/o of pain in the lumbar spine area, radiates to the lower limbs worse on the left side. Pt reports pain radiates down left thigh to knee and down right thigh causing tingling. Pt reports difficulty with ambulation due to back pain and \"balance issues\". Uses a cane for ambulation. Has sustained multiple falls with last fall being within last week. No redness, swelling or rashes. Pt has tried injections, physical therapy and NSAIDS. She can no longer take NSAIDS as she developed a bleeding ulcer and required a blood transfusion in August of 2018. Pt currently goes to pain management and has been on Percocet with minimal relief of back pain. Pt has post operative nausea and vomiting. Denies chest pain/pressure, palpitations, SOB, recent URI, nausea, vomiting, diarrhea, constipation, fever or chills.      PAST MEDICAL HISTORY     Past Medical History:   Diagnosis Date    Allergic rhinitis, cause unspecified     Back pain     lumbar    Bowel obstruction (HCC)     history of due to scar tissue, resolved non-surgically    C. difficile diarrhea     CAD (coronary artery disease)     Cellulitis     left leg    Cellulitis 2017 August    leg left leg/bug bite    Diverticulosis of colon (without mention of hemorrhage)     GERD (gastroesophageal reflux disease)     History of MI (myocardial infarction) 2005 2 times daily       Lancets MISC 1 each by Does not apply route daily 100 each 3    blood glucose monitor strips Test 2 times a day & as needed for symptoms of irregular blood glucose. 100 strip 5     No current facility-administered medications on file prior to encounter. General health:  Fairly good. No fever or chills. Skin:  No itching, redness or rash. HEENT:  No headache, epistaxis or sore throat. Neck:  No pain, stiffness or masses. Cardiovascular/Respiratory system:  No chest pain, palpitation or shortness of breath. Gastrointestinal tract: No abdominal pain, Dysphagia, nausea, vomiting, diarrhea or constipation. Genitourinary:  No burning on micturition. No hesitancy, urgency, frequency or discoloration of urine. Locomotor:  See HPI. Neuropsychiatric:  No referable complaints. GENERAL PHYSICAL EXAM:     Vitals: BP (!) 156/66   Pulse 66   Temp 98.6 °F (37 °C)   Resp 16   Ht 5' 3\" (1.6 m)   Wt 163 lb (73.9 kg)   SpO2 100%   BMI 28.87 kg/m²  Body mass index is 28.87 kg/m². GENERAL APPEARANCE:   Laurie Malloryman is 72 y.o.,  female, not obese, nourished, conscious, alert. Does not appear to be in any acute distress or pain at this time. SKIN:  Warm, dry, no cyanosis or jaundice. HEAD:  Normocephalic, atraumatic, no swelling or tenderness. EYES:  Pupils equal, reactive to light. Wears glasses. EARS:  No discharge, no marked hearing loss. NOSE:  No rhinorrhea, epistaxis or septal deformity. THROAT:  Not congested. No ulceration bleeding or discharge. NECK:  No stiffness, trachea central.  No palpable masses or L.N.                 CHEST:  Symmetrical and equal on expansion. HEART:  RRR S1 > S2.  No audible murmurs or gallops. LUNGS:  Equal on expansion, normal breath sounds. No adventitious sounds. ABDOMEN:  Soft on palpation. No localized tenderness. No guarding or rigidity. No palpable hepatosplenomegaly. LYMPHATICS:  No palpable cervical lymphadenopathy. LOCOMOTOR, BACK AND SPINE:  Using cane for ambulation. Tenderness to palpation to lumbar back. Range of motion is limited due to pain. No deformities. EXTREMITIES:  Symmetrical, no pretibial edema. No calf tenderness. No discoloration or ulcerations. NEUROLOGIC:  The patient is conscious, alert, oriented, Cranial nerve II-XII intact, taste and smell were not examined. No apparent focal sensory or motor deficits.                                                                                      PROVISIONAL DIAGNOSES / SURGERY:      SPONDYLOLISTHESIS    LUMBAR L4-5 POSTERIOR  DECOMPRESSION INSTRUMENTATION FUSION [address] AUGMENTATION/    Patient Active Problem List    Diagnosis Date Noted    CVA (cerebral vascular accident) ([address] 08/05/2015     Priority: High    BIN (acute kidney injury) ([address] 08/05/2015     Priority: High    Leg swelling 10/15/2019    Acquired spondylolisthesis 09/17/2019    Spinal stenosis of lumbar region with neurogenic claudication 09/17/2019    Acute deep vein thrombosis (DVT) of proximal vein of left lower extremity ([address] 09/17/2019    Clostridium difficile infection 08/25/2019    Acute renal failure ([address]     Melena 08/22/2019    PUD (peptic ulcer disease)     Absolute anemia     Acute blood loss anemia     Peptic ulcer 08/07/2019    GI bleed 08/03/2019    Closed head injury     Scalp laceration     Abnormal finding on imaging     Other irritable bowel syndrome     Frequent falls     Double vision     Muscle soreness     Iron deficiency anemia secondary to inadequate dietary iron intake 05/23/2019    Diarrhea due to malabsorption 05/23/2019    Stage 3 chronic kidney disease ([facility] 03/18/2019    Stage 3 chronic kidney disease ([facility] 03/18/2019    Type 2 diabetes mellitus with circulatory disorder, without long-term current use of insulin ([address] 03/18/2019    Chronic deep vein thrombosis (DVT) of popliteal vein of right lower extremity ([address] 03/18/2019    Closed fracture of left wrist 03/18/2019    B12 deficiency 03/18/2019    Sacroiliitis (HCC)     Neurogenic claudication due to lumbar spinal stenosis 07/05/2018    Chronic deep vein thrombosis (DVT) of proximal vein of both lower extremities ([address] 01/29/2018    Chronic diastolic heart failure ([facility] 01/29/2018    Shortness of breath 01/21/2018    Lumbar facet arthropathy     Deep vein thrombosis (DVT) of right lower extremity ([address] 08/30/2017    Encounter for medication monitoring 08/15/2017    Cellulitis of left lower extremity     Blood poisoning 07/30/2017    Spondylosis of lumbar region without myelopathy or radiculopathy     Lumbar degenerative disc disease     Lumbago 09/29/2016    Facet arthritis of lumbar region 09/29/2016    Meralgia paresthetica of right side 09/29/2016    Osteopenia 07/28/2016    Chest pain     Need for prophylactic vaccination and inoculation against cholera alone 05/10/2016    TIA (transient ischemic attack) 08/11/2015    Facial droop 08/05/2015    Bradycardia 08/05/2015    Ataxia 08/05/2015    Aphasia 08/05/2015    Injury of foot, left 06/29/2015    Degenerative joint disease (DJD) of hip 04/13/2015    Peripheral edema 04/13/2015    Depression 06/26/2014    Other specified disorders of rotator cuff syndrome of shoulder and allied disorders     Diverticulosis of large intestine     Intestinal or peritoneal adhesions with obstruction (postoperative) (postinfection) ([address]     Restless legs syndrome (RLS)     GERD (gastroesophageal reflux disease)     Essential hypertension     Mixed hyperlipidemia     Other abnormal glucose     Atherosclerosis     Lateral

## 2020-01-28 ENCOUNTER — TELEPHONE (OUTPATIENT)
Dept: INTERNAL MEDICINE CLINIC | Age: 70
End: 2020-01-28

## 2020-01-28 NOTE — TELEPHONE ENCOUNTER
Medical surgical clearance request received 01/28/20    Surgeon: Dr. Chely Daniels    Procedure: LUMBAR L4-5 POSTERIOR  DECOMPRESSION INSTRUMENTATION FUSION [address] AUGMENTATION/    Date of Procedure 2/10/20    Last appt: 1/13/20    Next appt: 5/18/2020    PATs received:    [x] yes   [] no    Documents placed on Yuni's desk.

## 2020-01-31 ENCOUNTER — OFFICE VISIT (OUTPATIENT)
Dept: INTERNAL MEDICINE CLINIC | Age: 70
End: 2020-01-31
Payer: MEDICARE

## 2020-01-31 VITALS
SYSTOLIC BLOOD PRESSURE: 110 MMHG | DIASTOLIC BLOOD PRESSURE: 62 MMHG | HEIGHT: 63 IN | WEIGHT: 162 LBS | HEART RATE: 71 BPM | OXYGEN SATURATION: 97 % | BODY MASS INDEX: 28.7 KG/M2

## 2020-01-31 PROCEDURE — G8484 FLU IMMUNIZE NO ADMIN: HCPCS | Performed by: INTERNAL MEDICINE

## 2020-01-31 PROCEDURE — 1036F TOBACCO NON-USER: CPT | Performed by: INTERNAL MEDICINE

## 2020-01-31 PROCEDURE — 99214 OFFICE O/P EST MOD 30 MIN: CPT | Performed by: INTERNAL MEDICINE

## 2020-01-31 PROCEDURE — 1123F ACP DISCUSS/DSCN MKR DOCD: CPT | Performed by: INTERNAL MEDICINE

## 2020-01-31 PROCEDURE — 1090F PRES/ABSN URINE INCON ASSESS: CPT | Performed by: INTERNAL MEDICINE

## 2020-01-31 PROCEDURE — 2022F DILAT RTA XM EVC RTNOPTHY: CPT | Performed by: INTERNAL MEDICINE

## 2020-01-31 PROCEDURE — 3046F HEMOGLOBIN A1C LEVEL >9.0%: CPT | Performed by: INTERNAL MEDICINE

## 2020-01-31 PROCEDURE — 4040F PNEUMOC VAC/ADMIN/RCVD: CPT | Performed by: INTERNAL MEDICINE

## 2020-01-31 PROCEDURE — 3017F COLORECTAL CA SCREEN DOC REV: CPT | Performed by: INTERNAL MEDICINE

## 2020-01-31 PROCEDURE — G8510 SCR DEP NEG, NO PLAN REQD: HCPCS | Performed by: INTERNAL MEDICINE

## 2020-01-31 PROCEDURE — G8399 PT W/DXA RESULTS DOCUMENT: HCPCS | Performed by: INTERNAL MEDICINE

## 2020-01-31 PROCEDURE — G8427 DOCREV CUR MEDS BY ELIG CLIN: HCPCS | Performed by: INTERNAL MEDICINE

## 2020-01-31 PROCEDURE — G8417 CALC BMI ABV UP PARAM F/U: HCPCS | Performed by: INTERNAL MEDICINE

## 2020-01-31 ASSESSMENT — ENCOUNTER SYMPTOMS
EYE DISCHARGE: 0
ABDOMINAL PAIN: 0
EYE PAIN: 0
CHEST TIGHTNESS: 0
BLOOD IN STOOL: 0
COLOR CHANGE: 0
CHOKING: 0
ABDOMINAL DISTENTION: 0
DIARRHEA: 0
CONSTIPATION: 0
COUGH: 0
SHORTNESS OF BREATH: 0
APNEA: 0
EYE ITCHING: 0
BACK PAIN: 1
EYE REDNESS: 0

## 2020-01-31 NOTE — PROGRESS NOTES
Subjective:      Chief Complaint   Patient presents with    Back Pain     Pt here today for clearance for back surgery, pt surgery is scheduled for 02/10/20       Patient ID: René Keyes is a 72 y.o. female. Visit Information    Have you changed or started any medications since your last visit including any over-the-counter medicines, vitamins, or herbal medicines? no   Are you having any side effects from any of your medications? -  no  Have you stopped taking any of your medications? Is so, why? -  no    Have you seen any other physician or provider since your last visit? No  Have you had any other diagnostic tests since your last visit? No  Have you been seen in the emergency room and/or had an admission to a hospital since we last saw you? No  Have you had your routine dental cleaning in the past 6 months? no    Have you activated your [employer] account? If not, what are your barriers?  Yes     Patient Care Team:  Joann Lockett MD as PCP - General (Internal Medicine)  Joann Lockett MD as PCP [facility]  Peter Dougherty MD as Consulting Physician (Gastroenterology)    Medical History Review  Past Medical, Family, and Social History reviewed and does not contribute to the patient presenting condition    Health Maintenance   Topic Date Due    Diabetic foot exam  01/18/1961    Diabetic retinal exam  01/18/1961    DTaP/Tdap/Td vaccine (1 - Tdap) 01/18/1962    Annual Wellness Visit (AWV)  05/29/2019    Breast cancer screen  08/04/2019    Flu vaccine (1) 09/01/2019    Shingles Vaccine (1 of 2) 10/10/2020 (Originally 1/18/2001)    Lipid screen  05/20/2020    A1C test (Diabetic or Prediabetic)  10/02/2020    Potassium monitoring  01/27/2021    Creatinine monitoring  01/27/2021    Colon cancer screen colonoscopy  10/07/2026    DEXA (modify frequency per FRAX score)  Completed    Pneumococcal 65+ years Vaccine  Completed    Hepatitis C screen  Completed       HPI- Patient is here For Pupils are equal, round, and reactive to light. Neck:      Musculoskeletal: Normal range of motion and neck supple. Thyroid: No thyromegaly. Vascular: No JVD. Trachea: No tracheal deviation. Cardiovascular:      Rate and Rhythm: Normal rate. Heart sounds: Murmur (Aortic area ) present. No gallop. Pulmonary:      Effort: Pulmonary effort is normal. No respiratory distress. Breath sounds: Normal breath sounds. No stridor. No wheezing or rales. Chest:      Chest wall: No tenderness. Abdominal:      General: Bowel sounds are normal. There is no distension. Palpations: Abdomen is soft. Tenderness: There is no abdominal tenderness. There is no guarding or rebound. Musculoskeletal: Normal range of motion. Comments: Uses cane    Neurological:      Mental Status: She is alert and oriented to person, place, and time. Assessment / Plan:   1. Preoperative clearance  METS cannot be calculated because of patient not able to walk, imbalance  She has high RCRI, history of she has a systolic murmur in aortic area  TIA   Discussed with Dr. Carl Norris, cardiologist on phone  Will expedite her appointment with Dr. Carl Norris will see patient on Monday  Her last echo was done in January 2018 no aortic valve pathology appreciated  She likely will need to have limited echo in the office to look for arctic valve pathology  Before surgery  Explained to patient at length  She is anyway at intermediate risk of postoperative cardiac complications    2. Cerebrovascular accident (CVA) due to embolism of anterior cerebral artery, unspecified blood vessel laterality (HCC)  On lipitor ,     3. Spinal stenosis of lumbar region with neurogenic claudication  Plan for surgery     4. Type 2 diabetes mellitus with other circulatory complication, without long-term current use of insulin ([address]  On januvia   HBAIC     5. Essential hypertension  Controlled     6.  Stage 3 chronic kidney disease ([facility]  Creatinine stable       · Return in about 3 months (around 4/30/2020). · Reviewed prior labs and health maintenance. · Discussed use, benefit, and side effects of prescribed medications. Barriers to medication compliance addressed. All patient questions answered. Pt voiced understanding. Tirso Roth MD  [facility]  1/31/2020, 4:07 PM    Please note that this chart was generated using voice recognition Dragon dictation software. Although every effort was made to ensure the accuracy of this automated transcription, some errors in transcription may have occurred.

## 2020-02-10 ENCOUNTER — APPOINTMENT (OUTPATIENT)
Dept: GENERAL RADIOLOGY | Age: 70
End: 2020-02-10
Attending: ORTHOPAEDIC SURGERY
Payer: MEDICARE

## 2020-02-10 ENCOUNTER — ANESTHESIA (OUTPATIENT)
Dept: OPERATING ROOM | Age: 70
End: 2020-02-10
Payer: MEDICARE

## 2020-02-10 ENCOUNTER — HOSPITAL ENCOUNTER (OUTPATIENT)
Age: 70
Setting detail: OBSERVATION
Discharge: NURSING, REHAB OR LONG-TERM CARE | End: 2020-02-12
Attending: ORTHOPAEDIC SURGERY | Admitting: ORTHOPAEDIC SURGERY
Payer: MEDICARE

## 2020-02-10 VITALS — TEMPERATURE: 98.6 F | SYSTOLIC BLOOD PRESSURE: 111 MMHG | DIASTOLIC BLOOD PRESSURE: 55 MMHG | OXYGEN SATURATION: 99 %

## 2020-02-10 PROBLEM — M54.9 BACK PAIN: Status: ACTIVE | Noted: 2020-02-10

## 2020-02-10 LAB
GLUCOSE BLD-MCNC: 103 MG/DL (ref 65–105)
GLUCOSE BLD-MCNC: 131 MG/DL (ref 65–105)
GLUCOSE BLD-MCNC: 97 MG/DL (ref 65–105)

## 2020-02-10 PROCEDURE — 2580000003 HC RX 258: Performed by: ANESTHESIOLOGY

## 2020-02-10 PROCEDURE — 22853 INSJ BIOMECHANICAL DEVICE: CPT | Performed by: ORTHOPAEDIC SURGERY

## 2020-02-10 PROCEDURE — 2580000003 HC RX 258: Performed by: ORTHOPAEDIC SURGERY

## 2020-02-10 PROCEDURE — 82947 ASSAY GLUCOSE BLOOD QUANT: CPT

## 2020-02-10 PROCEDURE — 22633 ARTHRD CMBN 1NTRSPC LUMBAR: CPT | Performed by: ORTHOPAEDIC SURGERY

## 2020-02-10 PROCEDURE — 7100000000 HC PACU RECOVERY - FIRST 15 MIN: Performed by: ORTHOPAEDIC SURGERY

## 2020-02-10 PROCEDURE — 6360000002 HC RX W HCPCS

## 2020-02-10 PROCEDURE — 6360000002 HC RX W HCPCS: Performed by: NURSE ANESTHETIST, CERTIFIED REGISTERED

## 2020-02-10 PROCEDURE — 6370000000 HC RX 637 (ALT 250 FOR IP): Performed by: ORTHOPAEDIC SURGERY

## 2020-02-10 PROCEDURE — 3600000003 HC SURGERY LEVEL 3 BASE: Performed by: ORTHOPAEDIC SURGERY

## 2020-02-10 PROCEDURE — 6360000002 HC RX W HCPCS: Performed by: ANESTHESIOLOGY

## 2020-02-10 PROCEDURE — 7100000001 HC PACU RECOVERY - ADDTL 15 MIN: Performed by: ORTHOPAEDIC SURGERY

## 2020-02-10 PROCEDURE — 3600000013 HC SURGERY LEVEL 3 ADDTL 15MIN: Performed by: ORTHOPAEDIC SURGERY

## 2020-02-10 PROCEDURE — 2780000010 HC IMPLANT OTHER: Performed by: ORTHOPAEDIC SURGERY

## 2020-02-10 PROCEDURE — 72100 X-RAY EXAM L-S SPINE 2/3 VWS: CPT

## 2020-02-10 PROCEDURE — 2709999900 HC NON-CHARGEABLE SUPPLY: Performed by: ORTHOPAEDIC SURGERY

## 2020-02-10 PROCEDURE — 3700000001 HC ADD 15 MINUTES (ANESTHESIA): Performed by: ORTHOPAEDIC SURGERY

## 2020-02-10 PROCEDURE — 6360000002 HC RX W HCPCS: Performed by: ORTHOPAEDIC SURGERY

## 2020-02-10 PROCEDURE — C1713 ANCHOR/SCREW BN/BN,TIS/BN: HCPCS | Performed by: ORTHOPAEDIC SURGERY

## 2020-02-10 PROCEDURE — 22840 INSERT SPINE FIXATION DEVICE: CPT | Performed by: ORTHOPAEDIC SURGERY

## 2020-02-10 PROCEDURE — 2720000010 HC SURG SUPPLY STERILE: Performed by: ORTHOPAEDIC SURGERY

## 2020-02-10 PROCEDURE — 2500000003 HC RX 250 WO HCPCS

## 2020-02-10 PROCEDURE — 3209999900 FLUORO FOR SURGICAL PROCEDURES

## 2020-02-10 PROCEDURE — 1200000000 HC SEMI PRIVATE

## 2020-02-10 PROCEDURE — 3700000000 HC ANESTHESIA ATTENDED CARE: Performed by: ORTHOPAEDIC SURGERY

## 2020-02-10 PROCEDURE — 6370000000 HC RX 637 (ALT 250 FOR IP): Performed by: ANESTHESIOLOGY

## 2020-02-10 PROCEDURE — 2500000003 HC RX 250 WO HCPCS: Performed by: ORTHOPAEDIC SURGERY

## 2020-02-10 DEVICE — GRAFT BNE CHIP 30 CC FD CORTICAL CANC: Type: IMPLANTABLE DEVICE | Site: BACK | Status: FUNCTIONAL

## 2020-02-10 DEVICE — SET SCR SPNL POLYAX ATR EVEREST: Type: IMPLANTABLE DEVICE | Site: SPINE LUMBAR | Status: FUNCTIONAL

## 2020-02-10 DEVICE — IMPLANTABLE DEVICE: Type: IMPLANTABLE DEVICE | Site: SPINE LUMBAR | Status: FUNCTIONAL

## 2020-02-10 DEVICE — Z DUPLICATE USE 2531502 SCREW SPNL L40MM OD5.5MM TI CO CHROM LUM PEDCL POLYAX VAR: Type: IMPLANTABLE DEVICE | Site: SPINE LUMBAR | Status: FUNCTIONAL

## 2020-02-10 DEVICE — ROD SPNL L45MM DIA5.5MM POST TI LUM CNTOUR BK SMOOTH DENALI: Type: IMPLANTABLE DEVICE | Site: SPINE LUMBAR | Status: FUNCTIONAL

## 2020-02-10 DEVICE — CEMENT C01A KYPHX HV-R BONE CEMENT EN
Type: IMPLANTABLE DEVICE | Site: SPINE LUMBAR | Status: FUNCTIONAL
Brand: KYPHON® HV-R® BONE CEMENT

## 2020-02-10 RX ORDER — SCOLOPAMINE TRANSDERMAL SYSTEM 1 MG/1
1 PATCH, EXTENDED RELEASE TRANSDERMAL ONCE
Status: DISCONTINUED | OUTPATIENT
Start: 2020-02-10 | End: 2020-02-12 | Stop reason: HOSPADM

## 2020-02-10 RX ORDER — PROMETHAZINE HYDROCHLORIDE 25 MG/ML
6.25 INJECTION, SOLUTION INTRAMUSCULAR; INTRAVENOUS
Status: DISCONTINUED | OUTPATIENT
Start: 2020-02-10 | End: 2020-02-10 | Stop reason: CLARIF

## 2020-02-10 RX ORDER — GLYCOPYRROLATE 1 MG/5 ML
SYRINGE (ML) INTRAVENOUS PRN
Status: DISCONTINUED | OUTPATIENT
Start: 2020-02-10 | End: 2020-02-10 | Stop reason: SDUPTHER

## 2020-02-10 RX ORDER — PROPOFOL 10 MG/ML
INJECTION, EMULSION INTRAVENOUS PRN
Status: DISCONTINUED | OUTPATIENT
Start: 2020-02-10 | End: 2020-02-10 | Stop reason: SDUPTHER

## 2020-02-10 RX ORDER — FENOFIBRATE 160 MG/1
160 TABLET ORAL
Status: DISCONTINUED | OUTPATIENT
Start: 2020-02-11 | End: 2020-02-12 | Stop reason: HOSPADM

## 2020-02-10 RX ORDER — HYDRALAZINE HYDROCHLORIDE 20 MG/ML
INJECTION INTRAMUSCULAR; INTRAVENOUS PRN
Status: DISCONTINUED | OUTPATIENT
Start: 2020-02-10 | End: 2020-02-10 | Stop reason: SDUPTHER

## 2020-02-10 RX ORDER — FUROSEMIDE 40 MG/1
40 TABLET ORAL DAILY
Status: DISCONTINUED | OUTPATIENT
Start: 2020-02-10 | End: 2020-02-12 | Stop reason: HOSPADM

## 2020-02-10 RX ORDER — MIDAZOLAM HYDROCHLORIDE 1 MG/ML
INJECTION INTRAMUSCULAR; INTRAVENOUS PRN
Status: DISCONTINUED | OUTPATIENT
Start: 2020-02-10 | End: 2020-02-10 | Stop reason: SDUPTHER

## 2020-02-10 RX ORDER — ATORVASTATIN CALCIUM 40 MG/1
40 TABLET, FILM COATED ORAL NIGHTLY
Status: DISCONTINUED | OUTPATIENT
Start: 2020-02-10 | End: 2020-02-12 | Stop reason: HOSPADM

## 2020-02-10 RX ORDER — KETAMINE HCL IN NACL, ISO-OSM 100MG/10ML
SYRINGE (ML) INJECTION PRN
Status: DISCONTINUED | OUTPATIENT
Start: 2020-02-10 | End: 2020-02-10 | Stop reason: SDUPTHER

## 2020-02-10 RX ORDER — 0.9 % SODIUM CHLORIDE 0.9 %
500 INTRAVENOUS SOLUTION INTRAVENOUS
Status: DISCONTINUED | OUTPATIENT
Start: 2020-02-10 | End: 2020-02-10 | Stop reason: HOSPADM

## 2020-02-10 RX ORDER — MORPHINE SULFATE 2 MG/ML
2 INJECTION, SOLUTION INTRAMUSCULAR; INTRAVENOUS
Status: DISCONTINUED | OUTPATIENT
Start: 2020-02-10 | End: 2020-02-11

## 2020-02-10 RX ORDER — CYCLOBENZAPRINE HCL 10 MG
10 TABLET ORAL 3 TIMES DAILY PRN
Status: DISCONTINUED | OUTPATIENT
Start: 2020-02-10 | End: 2020-02-11

## 2020-02-10 RX ORDER — PANTOPRAZOLE SODIUM 40 MG/1
40 TABLET, DELAYED RELEASE ORAL
Status: DISCONTINUED | OUTPATIENT
Start: 2020-02-10 | End: 2020-02-12 | Stop reason: HOSPADM

## 2020-02-10 RX ORDER — SUCCINYLCHOLINE/SOD CL,ISO/PF 200MG/10ML
SYRINGE (ML) INTRAVENOUS PRN
Status: DISCONTINUED | OUTPATIENT
Start: 2020-02-10 | End: 2020-02-10 | Stop reason: SDUPTHER

## 2020-02-10 RX ORDER — DEXAMETHASONE SODIUM PHOSPHATE 4 MG/ML
INJECTION, SOLUTION INTRA-ARTICULAR; INTRALESIONAL; INTRAMUSCULAR; INTRAVENOUS; SOFT TISSUE PRN
Status: DISCONTINUED | OUTPATIENT
Start: 2020-02-10 | End: 2020-02-10 | Stop reason: SDUPTHER

## 2020-02-10 RX ORDER — HYDRALAZINE HYDROCHLORIDE 20 MG/ML
5 INJECTION INTRAMUSCULAR; INTRAVENOUS EVERY 10 MIN PRN
Status: DISCONTINUED | OUTPATIENT
Start: 2020-02-10 | End: 2020-02-10 | Stop reason: HOSPADM

## 2020-02-10 RX ORDER — CARVEDILOL 12.5 MG/1
12.5 TABLET ORAL 2 TIMES DAILY
Status: DISCONTINUED | OUTPATIENT
Start: 2020-02-10 | End: 2020-02-12 | Stop reason: HOSPADM

## 2020-02-10 RX ORDER — NITROGLYCERIN 0.4 MG/1
0.4 TABLET SUBLINGUAL EVERY 5 MIN PRN
Status: DISCONTINUED | OUTPATIENT
Start: 2020-02-10 | End: 2020-02-12 | Stop reason: HOSPADM

## 2020-02-10 RX ORDER — FENTANYL CITRATE 50 UG/ML
25 INJECTION, SOLUTION INTRAMUSCULAR; INTRAVENOUS EVERY 5 MIN PRN
Status: DISCONTINUED | OUTPATIENT
Start: 2020-02-10 | End: 2020-02-10 | Stop reason: HOSPADM

## 2020-02-10 RX ORDER — LISINOPRIL 10 MG/1
10 TABLET ORAL DAILY
Status: DISCONTINUED | OUTPATIENT
Start: 2020-02-10 | End: 2020-02-12 | Stop reason: HOSPADM

## 2020-02-10 RX ORDER — PROMETHAZINE HYDROCHLORIDE 25 MG/ML
12.5 INJECTION, SOLUTION INTRAMUSCULAR; INTRAVENOUS EVERY 4 HOURS PRN
Status: DISCONTINUED | OUTPATIENT
Start: 2020-02-10 | End: 2020-02-10 | Stop reason: CLARIF

## 2020-02-10 RX ORDER — REMIFENTANIL HYDROCHLORIDE 1 MG/ML
INJECTION, POWDER, LYOPHILIZED, FOR SOLUTION INTRAVENOUS CONTINUOUS PRN
Status: DISCONTINUED | OUTPATIENT
Start: 2020-02-10 | End: 2020-02-10 | Stop reason: SDUPTHER

## 2020-02-10 RX ORDER — HYDRALAZINE HYDROCHLORIDE 50 MG/1
50 TABLET, FILM COATED ORAL 3 TIMES DAILY
Status: DISCONTINUED | OUTPATIENT
Start: 2020-02-10 | End: 2020-02-12 | Stop reason: HOSPADM

## 2020-02-10 RX ORDER — METOCLOPRAMIDE HYDROCHLORIDE 5 MG/ML
10 INJECTION INTRAMUSCULAR; INTRAVENOUS
Status: DISCONTINUED | OUTPATIENT
Start: 2020-02-10 | End: 2020-02-10 | Stop reason: HOSPADM

## 2020-02-10 RX ORDER — SODIUM CHLORIDE, SODIUM LACTATE, POTASSIUM CHLORIDE, CALCIUM CHLORIDE 600; 310; 30; 20 MG/100ML; MG/100ML; MG/100ML; MG/100ML
INJECTION, SOLUTION INTRAVENOUS CONTINUOUS
Status: DISCONTINUED | OUTPATIENT
Start: 2020-02-10 | End: 2020-02-10

## 2020-02-10 RX ORDER — DOCUSATE SODIUM 100 MG/1
100 CAPSULE, LIQUID FILLED ORAL 2 TIMES DAILY
Status: DISCONTINUED | OUTPATIENT
Start: 2020-02-10 | End: 2020-02-12 | Stop reason: HOSPADM

## 2020-02-10 RX ORDER — FENTANYL CITRATE 50 UG/ML
INJECTION, SOLUTION INTRAMUSCULAR; INTRAVENOUS PRN
Status: DISCONTINUED | OUTPATIENT
Start: 2020-02-10 | End: 2020-02-10 | Stop reason: SDUPTHER

## 2020-02-10 RX ORDER — FERROUS SULFATE 325(65) MG
325 TABLET ORAL
Status: DISCONTINUED | OUTPATIENT
Start: 2020-02-11 | End: 2020-02-12 | Stop reason: HOSPADM

## 2020-02-10 RX ORDER — MORPHINE SULFATE 4 MG/ML
4 INJECTION, SOLUTION INTRAMUSCULAR; INTRAVENOUS
Status: DISCONTINUED | OUTPATIENT
Start: 2020-02-10 | End: 2020-02-11

## 2020-02-10 RX ORDER — LABETALOL 20 MG/4 ML (5 MG/ML) INTRAVENOUS SYRINGE
5 EVERY 10 MIN PRN
Status: DISCONTINUED | OUTPATIENT
Start: 2020-02-10 | End: 2020-02-10 | Stop reason: HOSPADM

## 2020-02-10 RX ORDER — OXYCODONE HYDROCHLORIDE 5 MG/1
5 TABLET ORAL EVERY 4 HOURS PRN
Status: DISCONTINUED | OUTPATIENT
Start: 2020-02-10 | End: 2020-02-12 | Stop reason: HOSPADM

## 2020-02-10 RX ORDER — ONDANSETRON 2 MG/ML
INJECTION INTRAMUSCULAR; INTRAVENOUS PRN
Status: DISCONTINUED | OUTPATIENT
Start: 2020-02-10 | End: 2020-02-10 | Stop reason: SDUPTHER

## 2020-02-10 RX ORDER — ONDANSETRON 4 MG/1
4 TABLET, FILM COATED ORAL DAILY PRN
Status: DISCONTINUED | OUTPATIENT
Start: 2020-02-10 | End: 2020-02-12 | Stop reason: HOSPADM

## 2020-02-10 RX ORDER — OXYCODONE HYDROCHLORIDE 10 MG/1
10 TABLET ORAL EVERY 4 HOURS PRN
Status: DISCONTINUED | OUTPATIENT
Start: 2020-02-10 | End: 2020-02-12 | Stop reason: HOSPADM

## 2020-02-10 RX ORDER — PRAMIPEXOLE DIHYDROCHLORIDE 1.5 MG/1
1.5 TABLET ORAL DAILY
Status: DISCONTINUED | OUTPATIENT
Start: 2020-02-10 | End: 2020-02-12 | Stop reason: HOSPADM

## 2020-02-10 RX ORDER — CITALOPRAM 20 MG/1
10 TABLET ORAL DAILY
Status: DISCONTINUED | OUTPATIENT
Start: 2020-02-10 | End: 2020-02-12 | Stop reason: HOSPADM

## 2020-02-10 RX ORDER — BUPIVACAINE HYDROCHLORIDE AND EPINEPHRINE 2.5; 5 MG/ML; UG/ML
INJECTION, SOLUTION EPIDURAL; INFILTRATION; INTRACAUDAL; PERINEURAL PRN
Status: DISCONTINUED | OUTPATIENT
Start: 2020-02-10 | End: 2020-02-10 | Stop reason: ALTCHOICE

## 2020-02-10 RX ORDER — SODIUM CHLORIDE 0.9 % (FLUSH) 0.9 %
10 SYRINGE (ML) INJECTION PRN
Status: DISCONTINUED | OUTPATIENT
Start: 2020-02-10 | End: 2020-02-12 | Stop reason: HOSPADM

## 2020-02-10 RX ORDER — LIDOCAINE HYDROCHLORIDE 10 MG/ML
INJECTION, SOLUTION EPIDURAL; INFILTRATION; INTRACAUDAL; PERINEURAL PRN
Status: DISCONTINUED | OUTPATIENT
Start: 2020-02-10 | End: 2020-02-10 | Stop reason: SDUPTHER

## 2020-02-10 RX ORDER — ROCURONIUM BROMIDE 10 MG/ML
INJECTION, SOLUTION INTRAVENOUS PRN
Status: DISCONTINUED | OUTPATIENT
Start: 2020-02-10 | End: 2020-02-10 | Stop reason: SDUPTHER

## 2020-02-10 RX ORDER — OXYCODONE HYDROCHLORIDE AND ACETAMINOPHEN 5; 325 MG/1; MG/1
1 TABLET ORAL
Status: DISCONTINUED | OUTPATIENT
Start: 2020-02-10 | End: 2020-02-10 | Stop reason: HOSPADM

## 2020-02-10 RX ORDER — DIPHENHYDRAMINE HYDROCHLORIDE 50 MG/ML
12.5 INJECTION INTRAMUSCULAR; INTRAVENOUS
Status: DISCONTINUED | OUTPATIENT
Start: 2020-02-10 | End: 2020-02-10 | Stop reason: HOSPADM

## 2020-02-10 RX ORDER — SODIUM CHLORIDE 0.9 % (FLUSH) 0.9 %
10 SYRINGE (ML) INJECTION EVERY 12 HOURS SCHEDULED
Status: DISCONTINUED | OUTPATIENT
Start: 2020-02-10 | End: 2020-02-12 | Stop reason: HOSPADM

## 2020-02-10 RX ORDER — TRANEXAMIC ACID 100 MG/ML
INJECTION, SOLUTION INTRAVENOUS PRN
Status: DISCONTINUED | OUTPATIENT
Start: 2020-02-10 | End: 2020-02-10 | Stop reason: SDUPTHER

## 2020-02-10 RX ORDER — PROPOFOL 10 MG/ML
INJECTION, EMULSION INTRAVENOUS CONTINUOUS PRN
Status: DISCONTINUED | OUTPATIENT
Start: 2020-02-10 | End: 2020-02-10 | Stop reason: SDUPTHER

## 2020-02-10 RX ADMIN — PROPOFOL 100 MCG/KG/MIN: 10 INJECTION, EMULSION INTRAVENOUS at 11:34

## 2020-02-10 RX ADMIN — Medication 1 TABLET: at 20:00

## 2020-02-10 RX ADMIN — CYCLOBENZAPRINE HYDROCHLORIDE 10 MG: 10 TABLET, FILM COATED ORAL at 16:47

## 2020-02-10 RX ADMIN — LIDOCAINE HYDROCHLORIDE 50 MG: 10 INJECTION, SOLUTION EPIDURAL; INFILTRATION; INTRACAUDAL; PERINEURAL at 11:20

## 2020-02-10 RX ADMIN — HYDROMORPHONE HYDROCHLORIDE 0.5 MG: 1 INJECTION, SOLUTION INTRAMUSCULAR; INTRAVENOUS; SUBCUTANEOUS at 15:01

## 2020-02-10 RX ADMIN — FENTANYL CITRATE 100 MCG: 50 INJECTION, SOLUTION INTRAMUSCULAR; INTRAVENOUS at 11:48

## 2020-02-10 RX ADMIN — OXYCODONE HYDROCHLORIDE 5 MG: 5 TABLET ORAL at 20:00

## 2020-02-10 RX ADMIN — PROPOFOL 150 MG: 10 INJECTION, EMULSION INTRAVENOUS at 11:20

## 2020-02-10 RX ADMIN — FENTANYL CITRATE 100 MCG: 50 INJECTION, SOLUTION INTRAMUSCULAR; INTRAVENOUS at 11:20

## 2020-02-10 RX ADMIN — Medication 0.3 MG: at 11:38

## 2020-02-10 RX ADMIN — PROPOFOL 50 MG: 10 INJECTION, EMULSION INTRAVENOUS at 11:56

## 2020-02-10 RX ADMIN — SODIUM CHLORIDE, POTASSIUM CHLORIDE, SODIUM LACTATE AND CALCIUM CHLORIDE: 600; 310; 30; 20 INJECTION, SOLUTION INTRAVENOUS at 10:23

## 2020-02-10 RX ADMIN — HYDROMORPHONE HYDROCHLORIDE 0.5 MG: 1 INJECTION, SOLUTION INTRAMUSCULAR; INTRAVENOUS; SUBCUTANEOUS at 14:39

## 2020-02-10 RX ADMIN — MIDAZOLAM 2 MG: 1 INJECTION INTRAMUSCULAR; INTRAVENOUS at 11:15

## 2020-02-10 RX ADMIN — Medication 30 MG: at 11:25

## 2020-02-10 RX ADMIN — HYDRALAZINE HYDROCHLORIDE 5 MG: 20 INJECTION, SOLUTION INTRAMUSCULAR; INTRAVENOUS at 12:30

## 2020-02-10 RX ADMIN — PRAMIPEXOLE DIHYDROCHLORIDE 1.5 MG: 1.5 TABLET ORAL at 20:11

## 2020-02-10 RX ADMIN — CARVEDILOL 12.5 MG: 12.5 TABLET, FILM COATED ORAL at 20:11

## 2020-02-10 RX ADMIN — MORPHINE SULFATE 4 MG: 4 INJECTION, SOLUTION INTRAMUSCULAR; INTRAVENOUS at 16:47

## 2020-02-10 RX ADMIN — MORPHINE SULFATE 4 MG: 4 INJECTION, SOLUTION INTRAMUSCULAR; INTRAVENOUS at 22:00

## 2020-02-10 RX ADMIN — Medication 120 MG: at 11:20

## 2020-02-10 RX ADMIN — ATORVASTATIN CALCIUM 40 MG: 40 TABLET, FILM COATED ORAL at 20:00

## 2020-02-10 RX ADMIN — DEXTROSE MONOHYDRATE 2 G: 50 INJECTION, SOLUTION INTRAVENOUS at 21:52

## 2020-02-10 RX ADMIN — SODIUM CHLORIDE, POTASSIUM CHLORIDE, SODIUM LACTATE AND CALCIUM CHLORIDE: 600; 310; 30; 20 INJECTION, SOLUTION INTRAVENOUS at 12:46

## 2020-02-10 RX ADMIN — ROCURONIUM BROMIDE 10 MG: 10 INJECTION, SOLUTION INTRAVENOUS at 11:20

## 2020-02-10 RX ADMIN — PROPOFOL 150 MCG/KG/MIN: 10 INJECTION, EMULSION INTRAVENOUS at 11:54

## 2020-02-10 RX ADMIN — ONDANSETRON 4 MG: 2 INJECTION INTRAMUSCULAR; INTRAVENOUS at 11:29

## 2020-02-10 RX ADMIN — HYDRALAZINE HYDROCHLORIDE 50 MG: 50 TABLET, FILM COATED ORAL at 20:11

## 2020-02-10 RX ADMIN — FENTANYL CITRATE 50 MCG: 50 INJECTION, SOLUTION INTRAMUSCULAR; INTRAVENOUS at 11:54

## 2020-02-10 RX ADMIN — Medication 10 MG: at 12:25

## 2020-02-10 RX ADMIN — Medication 10 MG: at 13:29

## 2020-02-10 RX ADMIN — TRANEXAMIC ACID 1000 MG: 100 INJECTION, SOLUTION INTRAVENOUS at 11:27

## 2020-02-10 RX ADMIN — CEFAZOLIN 2 G: 10 INJECTION, POWDER, FOR SOLUTION INTRAVENOUS at 11:15

## 2020-02-10 RX ADMIN — DEXAMETHASONE SODIUM PHOSPHATE 4 MG: 4 INJECTION, SOLUTION INTRAMUSCULAR; INTRAVENOUS at 11:29

## 2020-02-10 RX ADMIN — REMIFENTANIL HYDROCHLORIDE 0.5 MCG/KG/MIN: 1 INJECTION, POWDER, LYOPHILIZED, FOR SOLUTION INTRAVENOUS at 12:02

## 2020-02-10 RX ADMIN — Medication 10 ML: at 20:00

## 2020-02-10 RX ADMIN — DOCUSATE SODIUM 100 MG: 100 CAPSULE, LIQUID FILLED ORAL at 20:00

## 2020-02-10 RX ADMIN — HYDROMORPHONE HYDROCHLORIDE 0.5 MG: 1 INJECTION, SOLUTION INTRAMUSCULAR; INTRAVENOUS; SUBCUTANEOUS at 14:47

## 2020-02-10 ASSESSMENT — PULMONARY FUNCTION TESTS
PIF_VALUE: 3
PIF_VALUE: 15
PIF_VALUE: 19
PIF_VALUE: 20
PIF_VALUE: 19
PIF_VALUE: 18
PIF_VALUE: 20
PIF_VALUE: 19
PIF_VALUE: 20
PIF_VALUE: 18
PIF_VALUE: 20
PIF_VALUE: 19
PIF_VALUE: 19
PIF_VALUE: 18
PIF_VALUE: 19
PIF_VALUE: 18
PIF_VALUE: 19
PIF_VALUE: 15
PIF_VALUE: 19
PIF_VALUE: 20
PIF_VALUE: 19
PIF_VALUE: 20
PIF_VALUE: 20
PIF_VALUE: 18
PIF_VALUE: 15
PIF_VALUE: 20
PIF_VALUE: 19
PIF_VALUE: 5
PIF_VALUE: 18
PIF_VALUE: 20
PIF_VALUE: 19
PIF_VALUE: 18
PIF_VALUE: 20
PIF_VALUE: 18
PIF_VALUE: 18
PIF_VALUE: 19
PIF_VALUE: 20
PIF_VALUE: 18
PIF_VALUE: 2
PIF_VALUE: 18
PIF_VALUE: 19
PIF_VALUE: 18
PIF_VALUE: 19
PIF_VALUE: 19
PIF_VALUE: 18
PIF_VALUE: 19
PIF_VALUE: 2
PIF_VALUE: 19
PIF_VALUE: 15
PIF_VALUE: 20
PIF_VALUE: 19
PIF_VALUE: 15
PIF_VALUE: 19
PIF_VALUE: 20
PIF_VALUE: 18
PIF_VALUE: 18
PIF_VALUE: 20
PIF_VALUE: 15
PIF_VALUE: 18
PIF_VALUE: 18
PIF_VALUE: 19
PIF_VALUE: 18
PIF_VALUE: 24
PIF_VALUE: 19
PIF_VALUE: 18
PIF_VALUE: 19
PIF_VALUE: 15
PIF_VALUE: 17
PIF_VALUE: 18
PIF_VALUE: 15
PIF_VALUE: 19
PIF_VALUE: 19
PIF_VALUE: 20
PIF_VALUE: 20
PIF_VALUE: 19
PIF_VALUE: 18
PIF_VALUE: 19
PIF_VALUE: 18
PIF_VALUE: 19
PIF_VALUE: 20
PIF_VALUE: 18
PIF_VALUE: 21
PIF_VALUE: 18
PIF_VALUE: 33
PIF_VALUE: 19
PIF_VALUE: 19
PIF_VALUE: 18
PIF_VALUE: 18
PIF_VALUE: 20
PIF_VALUE: 19
PIF_VALUE: 18
PIF_VALUE: 18
PIF_VALUE: 20
PIF_VALUE: 16
PIF_VALUE: 19
PIF_VALUE: 18
PIF_VALUE: 19
PIF_VALUE: 28
PIF_VALUE: 17
PIF_VALUE: 19
PIF_VALUE: 19
PIF_VALUE: 21
PIF_VALUE: 2
PIF_VALUE: 18
PIF_VALUE: 20
PIF_VALUE: 20
PIF_VALUE: 18
PIF_VALUE: 18
PIF_VALUE: 15
PIF_VALUE: 20
PIF_VALUE: 6
PIF_VALUE: 18
PIF_VALUE: 18
PIF_VALUE: 19
PIF_VALUE: 1
PIF_VALUE: 19
PIF_VALUE: 1
PIF_VALUE: 20
PIF_VALUE: 20
PIF_VALUE: 19
PIF_VALUE: 18
PIF_VALUE: 19
PIF_VALUE: 19
PIF_VALUE: 16
PIF_VALUE: 18
PIF_VALUE: 14
PIF_VALUE: 28
PIF_VALUE: 19
PIF_VALUE: 19
PIF_VALUE: 15
PIF_VALUE: 19
PIF_VALUE: 18
PIF_VALUE: 18
PIF_VALUE: 19
PIF_VALUE: 18
PIF_VALUE: 16
PIF_VALUE: 4
PIF_VALUE: 19
PIF_VALUE: 20
PIF_VALUE: 18
PIF_VALUE: 19
PIF_VALUE: 18
PIF_VALUE: 19
PIF_VALUE: 18
PIF_VALUE: 19
PIF_VALUE: 20
PIF_VALUE: 20
PIF_VALUE: 4
PIF_VALUE: 18
PIF_VALUE: 17
PIF_VALUE: 18
PIF_VALUE: 20
PIF_VALUE: 19
PIF_VALUE: 0
PIF_VALUE: 15
PIF_VALUE: 20
PIF_VALUE: 20
PIF_VALUE: 19
PIF_VALUE: 20
PIF_VALUE: 15
PIF_VALUE: 18
PIF_VALUE: 20
PIF_VALUE: 19

## 2020-02-10 ASSESSMENT — PAIN SCALES - GENERAL
PAINLEVEL_OUTOF10: 5
PAINLEVEL_OUTOF10: 8
PAINLEVEL_OUTOF10: 9
PAINLEVEL_OUTOF10: 8
PAINLEVEL_OUTOF10: 1
PAINLEVEL_OUTOF10: 8
PAINLEVEL_OUTOF10: 5
PAINLEVEL_OUTOF10: 0
PAINLEVEL_OUTOF10: 4
PAINLEVEL_OUTOF10: 0
PAINLEVEL_OUTOF10: 7
PAINLEVEL_OUTOF10: 7

## 2020-02-10 ASSESSMENT — PAIN - FUNCTIONAL ASSESSMENT: PAIN_FUNCTIONAL_ASSESSMENT: 0-10

## 2020-02-10 ASSESSMENT — PAIN DESCRIPTION - PAIN TYPE
TYPE: SURGICAL PAIN

## 2020-02-10 ASSESSMENT — PAIN DESCRIPTION - DESCRIPTORS: DESCRIPTORS: ACHING;SHOOTING

## 2020-02-10 ASSESSMENT — PAIN DESCRIPTION - LOCATION
LOCATION: BACK

## 2020-02-10 ASSESSMENT — ENCOUNTER SYMPTOMS: SHORTNESS OF BREATH: 1

## 2020-02-10 NOTE — INTERVAL H&P NOTE
H&P Update    Patient's History and Physical from January 27, 2020 was reviewed. Patient examined. There has been no change regarding her back. Pt reports falling 5 days ago, she braced herself with right wrist on the fall, pt went to urgent care. pt reports having some soreness. and has a brace. No injuries to her back.      Electronically signed by MAIK Talamantes CNP on 2/10/20 at 9:06 AM

## 2020-02-10 NOTE — ANESTHESIA PRE PROCEDURE
Department of Anesthesiology  Preprocedure Note       Name:  Connie Burnett   Age:  72 y.o.  :  1950                                          MRN:  [id number]         Date:  2/10/2020      Surgeon: Eleazar Montanez):  Yi Kuo MD    Procedure: LUMBAR L4-5 POSTERIOR  DECOMPRESSION INSTRUMENTATION FUSION WCEMENT AUGMENTATION/ (N/A )    Medications prior to admission:   Prior to Admission medications    Medication Sig Start Date End Date Taking? Authorizing Provider   furosemide (LASIX) 40 MG tablet Take 1 tablet by mouth daily 20  Yes Kim Rogers MD   ferrous sulfate 325 (65 Fe) MG tablet Take 1 tablet by mouth daily (with breakfast) 1/3/20  Yes Sam Hall MD   oxyCODONE-acetaminophen (PERCOCET) 7.5-325 MG per tablet Take 1 tablet by mouth every 8 hours as needed for Pain for up to 30 days.  1/11/20 2/10/20 Yes MAIK Sanchez - CNP   hydrALAZINE (APRESOLINE) 50 MG tablet Take 1 tablet by mouth 3 times daily 20  Yes Kim Rogers MD   citalopram (CELEXA) 10 MG tablet Take 1 tablet by mouth daily 20  Yes Kim Rogers MD   fenofibrate micronized (LOFIBRA) 134 MG capsule Take 1 capsule by mouth every morning (before breakfast) 19  Yes Kim Rogers MD   lisinopril (PRINIVIL;ZESTRIL) 10 MG tablet Take 1 tablet by mouth daily 19  Yes Kim Rogers MD   pramipexole (MIRAPEX) 1 MG tablet Take 1.5 tablets by mouth daily 19  Yes Kim Rogers MD   atorvastatin (LIPITOR) 80 MG tablet Take 0.5 tablets by mouth nightly 19  Yes Susanne Velez MD   pantoprazole (PROTONIX) 40 MG tablet Take 1 tablet by mouth 2 times daily (before meals) 19  Yes Susanne Velez MD   SITagliptin (JANUVIA) 100 MG tablet Take 0.5 tablets by mouth daily 10/10/19  Yes Namita Schwartz MD   carvedilol (COREG) 12.5 MG tablet Take 1 tablet by mouth 2 times daily 19  Yes Kristopher Serum, APRN - CNP   VITAMIN D, ERGOCALCIFEROL, PO Take by mouth    Historical Provider, MD   Lancets MISC 1 each by Does not apply route daily 10/10/19   April Couch MD   blood glucose monitor strips Test 2 times a day & as needed for symptoms of irregular blood glucose. 10/10/19   April Couch MD   ondansetron (ZOFRAN) 4 MG tablet Take 1 tablet by mouth daily as needed for Nausea or Vomiting 8/19/19   April Couch MD   lidocaine (XYLOCAINE) 5 % ointment 4-5 times a day to your right thigh for pain. 3/15/19   Pretty Jimenez MD   nitroGLYCERIN (NITROSTAT) 0.4 MG SL tablet Place 1 tablet under the tongue every 5 minutes as needed for Chest pain 1/8/18   April Couch MD   Calcium Carbonate-Vitamin D (CALCIUM 500/D) 500-125 MG-UNIT TABS Take 1 tablet by mouth 2 times daily     Historical Provider, MD       Current medications:    Current Facility-Administered Medications   Medication Dose Route Frequency Provider Last Rate Last Dose    lactated ringers infusion   Intravenous Continuous Don Balloon,  mL/hr at 02/10/20 1023      ceFAZolin (ANCEF) 2 g in dextrose 5 % 50 mL IVPB  2 g Intravenous Once Chiara Lambert MD           Allergies:     Allergies   Allergen Reactions    Bactrim [Sulfamethoxazole-Trimethoprim] Other (See Comments)     sepsis    Codeine Itching    Seasonal        Problem List:    Patient Active Problem List   Diagnosis Code    Other specified disorders of rotator cuff syndrome of shoulder and allied disorders M75.100    Diverticulosis of large intestine K57.30    Intestinal or peritoneal adhesions with obstruction (postoperative) (postinfection) ([facility] [address] K56.50    Restless legs syndrome (RLS) G25.81    GERD (gastroesophageal reflux disease) K21.9    Essential hypertension I10    Mixed hyperlipidemia E78.2    Other abnormal glucose R73.09    Atherosclerosis I70.90    Lateral epicondylitis M77.10    Allergic rhinitis J30.9    Vitamin D deficiency E55.9    Depression F32.9    Degenerative joint disease (DJD) of hip M16.9    Peripheral edema R60.9    Injury of foot, left right facial    HYSTERECTOMY      taken as a result of recurring cysts    OTHER SURGICAL HISTORY  14    FESS    OVARY REMOVAL  1970    UNILATERAL due to cyst    OVARY REMOVAL      partial, due to cyst    SINUS SURGERY      UPPER GASTROINTESTINAL ENDOSCOPY N/A 2019    EGD ESOPHAGOGASTRODUODENOSCOPY performed by Stacy Villatoro MD at [address] [facility] N/A 2019    EGD BIOPSY performed by Eliseo Lucero MD at [address] [facility] N/A 2019    EGD BIOPSY performed by Stacy Villatoro MD at [address] Left 3/5/2019    WRIST OPEN REDUCTION INTERNAL FIXATION performed by Anival Jim MD at [facility] History:    Social History     Tobacco Use    Smoking status: Former Smoker     Packs/day: 0.50     Years: 20.00     Pack years: 10.00     Types: Cigarettes     Start date: 1995     Last attempt to quit: 2017     Years since quittin.6    Smokeless tobacco: Never Used   Substance Use Topics    Alcohol use: No     Alcohol/week: 0.0 standard drinks                                Counseling given: Not Answered      Vital Signs (Current):   Vitals:    02/10/20 0941   BP: (!) 170/74   Pulse: 66   Resp: 18   Temp: 98.2 °F (36.8 °C)   TempSrc: Oral   SpO2: 99%   Weight: 163 lb (73.9 kg)   Height: 5' 3\" (1.6 m)                                              BP Readings from Last 3 Encounters:   02/10/20 (!) 170/74   20 (!) 156/66   20 110/62       NPO Status: Time of last liquid consumption:                         Time of last solid consumption:                         Date of last liquid consumption: 20                        Date of last solid food consumption: 20    BMI:   Wt Readings from Last 3 Encounters:   02/10/20 163 lb (73.9 kg)   20 163 lb (73.9 kg)   20 162 lb (73.5 kg)     Body mass index is 28.87 kg/m².     CBC:   Lab Results

## 2020-02-11 LAB
GLUCOSE BLD-MCNC: 103 MG/DL (ref 65–105)
GLUCOSE BLD-MCNC: 144 MG/DL (ref 65–105)
GLUCOSE BLD-MCNC: 176 MG/DL (ref 65–105)
HCT VFR BLD CALC: 26.7 % (ref 36–46)
HEMOGLOBIN: 8.9 G/DL (ref 12–16)
MCH RBC QN AUTO: 30.7 PG (ref 26–34)
MCHC RBC AUTO-ENTMCNC: 33.5 G/DL (ref 31–37)
MCV RBC AUTO: 91.7 FL (ref 80–100)
NRBC AUTOMATED: ABNORMAL PER 100 WBC
PDW BLD-RTO: 13.7 % (ref 11.5–14.9)
PLATELET # BLD: 307 K/UL (ref 150–450)
PMV BLD AUTO: 7 FL (ref 6–12)
RBC # BLD: 2.91 M/UL (ref 4–5.2)
WBC # BLD: 9.2 K/UL (ref 3.5–11)

## 2020-02-11 PROCEDURE — 6370000000 HC RX 637 (ALT 250 FOR IP): Performed by: ORTHOPAEDIC SURGERY

## 2020-02-11 PROCEDURE — 99223 1ST HOSP IP/OBS HIGH 75: CPT | Performed by: INTERNAL MEDICINE

## 2020-02-11 PROCEDURE — 82947 ASSAY GLUCOSE BLOOD QUANT: CPT

## 2020-02-11 PROCEDURE — 6360000002 HC RX W HCPCS: Performed by: ORTHOPAEDIC SURGERY

## 2020-02-11 PROCEDURE — 97166 OT EVAL MOD COMPLEX 45 MIN: CPT

## 2020-02-11 PROCEDURE — 36415 COLL VENOUS BLD VENIPUNCTURE: CPT

## 2020-02-11 PROCEDURE — 85027 COMPLETE CBC AUTOMATED: CPT

## 2020-02-11 PROCEDURE — 6360000002 HC RX W HCPCS: Performed by: INTERNAL MEDICINE

## 2020-02-11 PROCEDURE — 96365 THER/PROPH/DIAG IV INF INIT: CPT

## 2020-02-11 PROCEDURE — G0378 HOSPITAL OBSERVATION PER HR: HCPCS

## 2020-02-11 PROCEDURE — 99024 POSTOP FOLLOW-UP VISIT: CPT | Performed by: ORTHOPAEDIC SURGERY

## 2020-02-11 PROCEDURE — 97530 THERAPEUTIC ACTIVITIES: CPT

## 2020-02-11 PROCEDURE — 2580000003 HC RX 258: Performed by: ORTHOPAEDIC SURGERY

## 2020-02-11 PROCEDURE — 97116 GAIT TRAINING THERAPY: CPT

## 2020-02-11 PROCEDURE — 97535 SELF CARE MNGMENT TRAINING: CPT

## 2020-02-11 PROCEDURE — 97162 PT EVAL MOD COMPLEX 30 MIN: CPT

## 2020-02-11 PROCEDURE — G0378 HOSPITAL OBSERVATION PER HR: HCPCS | Performed by: ANESTHESIOLOGY

## 2020-02-11 PROCEDURE — 2580000003 HC RX 258: Performed by: INTERNAL MEDICINE

## 2020-02-11 RX ADMIN — DOCUSATE SODIUM 100 MG: 100 CAPSULE, LIQUID FILLED ORAL at 07:52

## 2020-02-11 RX ADMIN — IRON SUCROSE 200 MG: 20 INJECTION, SOLUTION INTRAVENOUS at 13:47

## 2020-02-11 RX ADMIN — Medication 10 ML: at 22:00

## 2020-02-11 RX ADMIN — FUROSEMIDE 40 MG: 40 TABLET ORAL at 07:52

## 2020-02-11 RX ADMIN — DEXTROSE MONOHYDRATE 2 G: 50 INJECTION, SOLUTION INTRAVENOUS at 06:22

## 2020-02-11 RX ADMIN — DOCUSATE SODIUM 100 MG: 100 CAPSULE, LIQUID FILLED ORAL at 22:00

## 2020-02-11 RX ADMIN — CARVEDILOL 12.5 MG: 12.5 TABLET, FILM COATED ORAL at 22:00

## 2020-02-11 RX ADMIN — CITALOPRAM HYDROBROMIDE 10 MG: 20 TABLET ORAL at 07:52

## 2020-02-11 RX ADMIN — Medication 10 ML: at 07:59

## 2020-02-11 RX ADMIN — PANTOPRAZOLE SODIUM 40 MG: 40 TABLET, DELAYED RELEASE ORAL at 06:22

## 2020-02-11 RX ADMIN — PANTOPRAZOLE SODIUM 40 MG: 40 TABLET, DELAYED RELEASE ORAL at 17:17

## 2020-02-11 RX ADMIN — ATORVASTATIN CALCIUM 40 MG: 40 TABLET, FILM COATED ORAL at 22:00

## 2020-02-11 RX ADMIN — FERROUS SULFATE TAB 325 MG (65 MG ELEMENTAL FE) 325 MG: 325 (65 FE) TAB at 07:53

## 2020-02-11 RX ADMIN — HYDRALAZINE HYDROCHLORIDE 50 MG: 50 TABLET, FILM COATED ORAL at 07:52

## 2020-02-11 RX ADMIN — CARVEDILOL 12.5 MG: 12.5 TABLET, FILM COATED ORAL at 07:53

## 2020-02-11 RX ADMIN — HYDRALAZINE HYDROCHLORIDE 50 MG: 50 TABLET, FILM COATED ORAL at 22:00

## 2020-02-11 RX ADMIN — OXYCODONE HYDROCHLORIDE 10 MG: 10 TABLET ORAL at 03:27

## 2020-02-11 RX ADMIN — OXYCODONE HYDROCHLORIDE 10 MG: 10 TABLET ORAL at 17:15

## 2020-02-11 RX ADMIN — LISINOPRIL 10 MG: 10 TABLET ORAL at 07:52

## 2020-02-11 RX ADMIN — PRAMIPEXOLE DIHYDROCHLORIDE 1.5 MG: 1.5 TABLET ORAL at 22:00

## 2020-02-11 RX ADMIN — LINAGLIPTIN 5 MG: 5 TABLET, FILM COATED ORAL at 07:53

## 2020-02-11 RX ADMIN — Medication 10 ML: at 06:20

## 2020-02-11 RX ADMIN — FENOFIBRATE 160 MG: 160 TABLET ORAL at 06:20

## 2020-02-11 RX ADMIN — Medication 1 TABLET: at 22:00

## 2020-02-11 RX ADMIN — OXYCODONE HYDROCHLORIDE 10 MG: 10 TABLET ORAL at 07:53

## 2020-02-11 RX ADMIN — OXYCODONE HYDROCHLORIDE 10 MG: 10 TABLET ORAL at 22:00

## 2020-02-11 RX ADMIN — Medication 1 TABLET: at 07:53

## 2020-02-11 SDOH — SOCIAL STABILITY: SOCIAL NETWORK: HOW OFTEN DO YOU GET TOGETHER WITH FRIENDS OR RELATIVES?: PATIENT DECLINED

## 2020-02-11 SDOH — SOCIAL STABILITY: SOCIAL NETWORK: HOW OFTEN DO YOU ATTEND CHURCH OR RELIGIOUS SERVICES?: PATIENT DECLINED

## 2020-02-11 SDOH — SOCIAL STABILITY: SOCIAL NETWORK: HOW OFTEN DO YOU ATTENT MEETINGS OF THE CLUB OR ORGANIZATION YOU BELONG TO?: PATIENT DECLINED

## 2020-02-11 SDOH — SOCIAL STABILITY: SOCIAL NETWORK
DO YOU BELONG TO ANY CLUBS OR ORGANIZATIONS SUCH AS CHURCH GROUPS UNIONS, FRATERNAL OR ATHLETIC GROUPS, OR SCHOOL GROUPS?: PATIENT DECLINED

## 2020-02-11 SDOH — SOCIAL STABILITY: SOCIAL NETWORK: ARE YOU MARRIED, WIDOWED, DIVORCED, SEPARATED, NEVER MARRIED, OR LIVING WITH A PARTNER?: PATIENT DECLINED

## 2020-02-11 SDOH — SOCIAL STABILITY: SOCIAL NETWORK: IN A TYPICAL WEEK, HOW MANY TIMES DO YOU TALK ON THE PHONE WITH FAMILY, FRIENDS, OR NEIGHBORS?: PATIENT DECLINED

## 2020-02-11 ASSESSMENT — PAIN DESCRIPTION - PAIN TYPE
TYPE: SURGICAL PAIN

## 2020-02-11 ASSESSMENT — PAIN SCALES - GENERAL
PAINLEVEL_OUTOF10: 8
PAINLEVEL_OUTOF10: 5
PAINLEVEL_OUTOF10: 5
PAINLEVEL_OUTOF10: 0
PAINLEVEL_OUTOF10: 7
PAINLEVEL_OUTOF10: 5
PAINLEVEL_OUTOF10: 6
PAINLEVEL_OUTOF10: 5
PAINLEVEL_OUTOF10: 4
PAINLEVEL_OUTOF10: 0
PAINLEVEL_OUTOF10: 1
PAINLEVEL_OUTOF10: 5
PAINLEVEL_OUTOF10: 3
PAINLEVEL_OUTOF10: 5
PAINLEVEL_OUTOF10: 0
PAINLEVEL_OUTOF10: 1
PAINLEVEL_OUTOF10: 4

## 2020-02-11 ASSESSMENT — PAIN DESCRIPTION - PROGRESSION
CLINICAL_PROGRESSION: NOT CHANGED

## 2020-02-11 ASSESSMENT — PAIN DESCRIPTION - DESCRIPTORS
DESCRIPTORS: ACHING

## 2020-02-11 ASSESSMENT — PAIN DESCRIPTION - ORIENTATION
ORIENTATION: LOWER
ORIENTATION: LEFT
ORIENTATION: LOWER

## 2020-02-11 ASSESSMENT — PAIN DESCRIPTION - LOCATION
LOCATION: BACK
LOCATION: BACK;NECK

## 2020-02-11 ASSESSMENT — PAIN DESCRIPTION - DIRECTION
RADIATING_TOWARDS: LEFT THIGH
RADIATING_TOWARDS: LEFT THIGH

## 2020-02-11 ASSESSMENT — PAIN DESCRIPTION - FREQUENCY
FREQUENCY: CONTINUOUS

## 2020-02-11 NOTE — DISCHARGE INSTR - COC
Continuity of Care Form    Patient Name: Ashok Ko   :  1950  MRN:  [id number]    Admit date:  2/10/2020  Discharge date:  2020    Code Status Order: Full Code   Advance Directives:   Advance Care Flowsheet Documentation     Date/Time Healthcare Directive Type of Healthcare Directive Copy in [address] Agent's Name Healthcare Agent's Phone Number    02/10/20 1844  No, patient does not have an advance directive for healthcare treatment -- -- -- -- --          Admitting Physician:  Za Veloz MD  PCP: Amado Parra MD    Discharging Nurse:   [address] Unit/Room#:   Discharging Unit Phone Number: [phone]    Emergency Contact:   Extended Emergency Contact Information  Primary Emergency Contact: Lamine Guerrero  Address: [address] [place] [address], [address] [place] [address] Phone: [phone]  Work Phone: [phone]  Mobile Phone: [phone]  Relation: Spouse  Hearing or visual needs: None  Other needs: None  Preferred language: English   needed?  No    Past Surgical History:  Past Surgical History:   Procedure Laterality Date    ABDOMEN SURGERY  1976    benign tumor removed near remaining ovary, 1.5 pounds    APPENDECTOMY  1968    appendix ruptured, developed peritonitis    BUNIONECTOMY Left     along with calcium deposits removed   [address]      negative    COLECTOMY  1969    12 INCHES REMOVED D/T OBSTRUCTION    COLONOSCOPY      CYST REMOVAL Right     right facial    HYSTERECTOMY  1973    taken as a result of recurring cysts    LUMBAR FUSION N/A 2/10/2020    LUMBAR L4-5 POSTERIOR  DECOMPRESSION INSTRUMENTATION FUSION WCEMENT AUGMENTATION/ performed by Za Veloz MD at [address] [place] [address]  14    FESS    OVARY REMOVAL  1970    UNILATERAL due to cyst    OVARY REMOVAL      partial, due to cyst   [facility] SINUS SURGERY      services. Patient's personal belongings (please select all that are sent with patient):  Glasses, clothing, cell phone    RN SIGNATURE:  {Esignature:[id number]}    CASE MANAGEMENT/SOCIAL WORK SECTION    Inpatient Status Date: ***    Readmission Risk Assessment Score:  Readmission Risk              Risk of Unplanned Readmission:        21           Discharging to Facility/ [address]  Phone: [phone]  · Fax [phone]    Dialysis Facility (if applicable)   · Name:  · Address:  · Dialysis Schedule:  · Phone:  · Fax:    / signature: Electronically signed by Amaya Brooks RN on 2/11/20 at 9:01 AM    PHYSICIAN SECTION    Prognosis: Good    Condition at Discharge: Stable    Rehab Potential (if transferring to Rehab): Good    Recommended Labs or Other Treatments After Discharge: na    Physician Certification: I certify the above information and transfer of Pedro Blackburn  is necessary for the continuing treatment of the diagnosis listed and that she requires Home Care for less 30 days.      Update Admission H&P: No change in H&P    PHYSICIAN SIGNATURE:  Electronically signed by Blanche Andrade MD on 2/12/20 at 9:08 AM

## 2020-02-11 NOTE — ANESTHESIA POSTPROCEDURE EVALUATION
Department of Anesthesiology  Postprocedure Note    Patient: Bennett Meier  MRN: [id number]  YOB: 1950  Date of evaluation: 2/11/2020  Time:  2:48 PM     Procedure Summary     Date:  02/10/20 Room / Location:  [address] [place] [address] Jean-Claude Mg 01 / [facility]: SAROJ HINTON    Anesthesia Start:  1115 Anesthesia Stop:  1430    Procedure:  LUMBAR L4-5 POSTERIOR  DECOMPRESSION INSTRUMENTATION FUSION WCEMENT AUGMENTATION/ (N/A Spine Lumbar) Diagnosis:  (SPONDYLOLISTHESIS)    Surgeon:  Hubert Claude, MD Responsible Provider:  Elisabeth Peguero MD    Anesthesia Type:  general ASA Status:  3          Anesthesia Type: general    Elver Phase I: Elver Score: 8    Elver Phase II:      Last vitals: Reviewed and per EMR flowsheets. Anesthesia Post Evaluation    Comments: POD #1. Patient seen at bedside. No anesthesia complications reported.

## 2020-02-11 NOTE — PROGRESS NOTES
present? : Metal implants(L4-L5 PLIF 2/10/20, L wrist fx)  Required Braces or Orthoses?: No(Reports brace for B hands, but does not wear often)  Equipment Used: Bed, Other(chair)     Vitals  Temp: 99.3 °F (37.4 °C)  Pulse: 70  Resp: 20  BP: (!) 102/40  Height: 5' 4\" (162.6 cm)  Weight: 167 lb 8.8 oz (76 kg)  BMI (Calculated): 28.8  Oxygen Therapy  SpO2: 94 %  Pulse Oximeter Device Mode: Continuous  Pulse Oximeter Device Location: Left, Finger  O2 Device: None (Room air)  O2 Flow Rate (L/min): 0 L/min  Level of Consciousness: Alert    Subjective  Subjective: \"The chair I sleep in reclines\" Pt states in regard to home d/c. Pt reports the left-sided weakness of her body is typical, however unable to state the cause of the weakness. RN Travis Ramires notified of LUE and LLE weakness and coordination deficits. Pt states she has had Left sided weakness for a long time, but unable to quantify. Vision  Vision: Impaired  Vision Exceptions: Wears glasses for reading  Hearing  Hearing: Within functional limits  Social/Functional History  Lives With: Spouse  Type of Home: House  Home Layout: One level, Laundry in basement  Home Access: Stairs to enter without rails  Entrance Stairs - Number of Steps: 1  Bathroom Shower/Tub: Tub/Shower unit, Doors, Shower chair with back  [employer]: Standard  Bathroom Equipment: Toilet raiser, Grab bars around toilet, Grab bars in shower, Shower chair, Hand-held shower  Bathroom Accessibility: Walker accessible  Home Equipment: Cane(3WW)  ADL Assistance: Independent  Homemaking Assistance: Independent  Homemaking Responsibilities: Yes  Ambulation Assistance: Independent(with cane)  Transfer Assistance: Independent  Active : Yes  Mode of Transportation: SUV  Occupation: Retired  Type of occupation:   Additional Comments: Pt's spouse is retired and able to provide 24/7 assist if needed upon d/c.   Pain Assessment  Pain Assessment: 0-10  Pain Level: 5  Patient's Stated Pain Goal: tasks due to impaired balance and impaired GMC of LUE. Impaired FMC/GMC of LUE and impaired motor planning of LUE as well as L neglect noted throughout AM and PM session. RN Sylvia Silverman notified of Pt's Left sided deficits. UE Function           LUE Strength  Gross LUE Strength: WFL  L Hand General: 4-/5  LUE Strength Comment: Shoulder 4-/5, elbow 4-/5     LUE Tone: Normotonic     LUE AROM (degrees)  LUE AROM : WFL     Left Hand AROM (degrees)  Left Hand AROM: WFL  RUE Strength  Gross RUE Strength: WFL  R Hand General: 4/5  RUE Strength Comment: Shoulder 4/5, elbow 4/5      RUE Tone: Normotonic     RUE AROM (degrees)  RUE AROM : WFL     Right Hand AROM (degrees)  Right Hand AROM: WFL    Fine Motor Skills  Coordination  Movements Are Fluid And Coordinated: No  Coordination and Movement description: Left UE, Decreased accuracy, Decreased speed, Gross motor impairments, Fine motor impairments                           Mobility  Supine to Sit: Maximum assistance  Sit to Supine: Unable to assess(seated in chair)       Balance  Sitting Balance: Maximum assistance(due to Left lateral lean; fluctuated between Max A and CGA)  Standing Balance: Moderate assistance(flucuating between Mod A and CGA)  Standing Balance  Time: AM: 1-2 minutes x 1; PM: 2-3 minutes x 1, 1-2 minutes x 2  Activity: AM: pivot to chair; PM: toileting tasks, transfers, mobility  Comment: Mod/Max VCs for safety  Functional Mobility  Functional - Mobility Device: Rolling Walker  Activity: Other, To/from bathroom(pivot only in AM due to poor advance of LLE; bathroom in PM)  Assist Level:  Moderate assistance(Mod A x 2 to pivot in AM; Min A to bathroom in PM)  Functional Mobility Comments: Max VCs for safety with noted neglect of L with RW via bumping into walls, bed, doorframes  Bed mobility  Rolling to Left: Maximum assistance  Rolling to Right: Unable to assess  Supine to Sit: Maximum assistance  Sit to Supine: Unable to assess(seated in chair)  Comment: Pt needs increased time for mobility as well as processing inforamtion, pt with left lateral lean, multiple cues needed to self coorect/lean to right to come to neutral position. During PM session, Pt required Min A for rolling to Right and Min A for supine to sit. Transfers  Stand Pivot Transfers: 2 Person assistance, Moderate assistance(in AM)  Sit to stand: Moderate assistance  Stand to sit: Moderate assistance  Transfer Comments: Mod A x 1 in AM for sit<>stand transfers and Min A x 1 in PM for sit<>stand transfers  Toilet Transfers  Toilet - Technique: Ambulating  Equipment Used: Grab bars  Toilet Transfer: Minimal assistance  Toilet Transfers Comments: during PM. Max VCs for hand placement and safety  Wheelchair Bed Transfers  Wheelchair/Bed - Technique: Stand pivot, To right  Equipment Used: Bed, Other(chair)  Level of Asssistance: 2 Person assistance, Moderate assistance  Wheelchair Transfers Comments: in AM with RW  Functional Activity Tolerance  Functional Activity Tolerance: Tolerates 10 - 20 min exercise with multiple rests   Assessment  Performance deficits / Impairments: Decreased functional mobility , Decreased ADL status, Decreased strength, Decreased safe awareness, Decreased endurance, Decreased cognition, Decreased balance, Decreased high-level IADLs, Decreased fine motor control, Decreased coordination, Decreased posture  Treatment Diagnosis: Impaired self-care status.   Prognosis: Fair  Decision Making: Medium Complexity  REQUIRES OT FOLLOW UP: Yes  Discharge Recommendations: Patient would benefit from continued therapy after discharge  Activity Tolerance: Patient Tolerated treatment well         Functional Outcome Measures  AM-PAC Daily Activity Inpatient   How much help for putting on and taking off regular lower body clothing?: Total  How much help for Bathing?: A Lot  How much help for Toileting?: A Lot  How much help for putting on and taking off regular upper body clothing?: A Little  How at 2:25 PM

## 2020-02-11 NOTE — OP NOTE
hole was replaced. This  resulted in nice vertebroplasty effect at L4 and L5 at all screws  levels. Screws were then slightly jostled, as the cement was hardened,  so as to not form a strong bond to the cement. At this juncture, the stu was placed on the right hand side. The  patient's disc space was distracted and reduced from its  spondylolisthesis. Complete laminectomy was then completed followed by significant partial  medial facetectomies, foraminotomies to approaching a TLIF approach at  both sides. Annulotomy was then performed bilaterally with combination of scalpel,  intradiskal pituitaries, paddle edwar until the disc had been removed  through the cartilaginous endplates. Expandable intervertebral body fusion cage expanding to 14 mm was then  placed, checked fluoroscopically. Lateral to the cage and posterior was then copiously grafted  bilaterally. Rods were then compressed on the right and a stu was then placed on the  left and this was slightly compressed. All screws were torqued. Lateral gutters were grafted with combination of the remainder of the  local autograft as well as crushed cortical cancellous allograft. FloSeal was used to obtain a bit better hemostasis as we still had a  little bit of epidural ooze. This was then thoroughly irrigated. We had good hemostasis. A gram of  vancomycin was placed in the depths of wound. The deep fascia was  re-approximated with #2 Vicryl suture, subcuticular layer was  re-approximated with 2-0 Vicryl suture followed by skin staples, Aquacel  dressing. The patient was awakened from anesthesia, taken to recovery  room in stable condition. COMPLICATIONS RAISED DURING THE OPERATION:  None noted.         SHAREE Paez    D: 02/10/2020 14:43:37       T: 02/10/2020 14:48:05     [email or web address]  Job#: [id number]     Doc#: [id number]    CC:

## 2020-02-11 NOTE — CARE COORDINATION
Follow up appointment made for patient with DR. Dolly Quigley on 2/17/20 at 2:15. Notified patient of date and time. Patient verbalizes understanding. Appointment entered into discharge navigator.     Electronically signed by Marysol Roberts RN on 2/11/2020 at 8:27 AM
Informed pt. That she has been changed from Inpatient Status to Observations status & Needs a MOON Letter. Writer did inform pt. Of this & she wants writer to leave letter for her to read for at a later time. Writer will follow.
to drive and/or has transportation:  Independent  Ability to do shopping:  Independent  Ability to manage finances:   Independent  Is patient able to live independently?:  Yes  Hearing and Vision  Visual Impairment:  Visual impairment (Glasses/contacts)  Care Transitions Interventions     Other Services:   (Comment: set up vns/OL)                                  Follow Up  Future Appointments   Date Time Provider Britni Joanna   2/17/2020  2:15 PM Moreno Kern PA-C [address]   2/25/2020  9:40 AM Nj Win MD [facility]   4/10/2020 10:30 AM Fabio Harden MD [facility]   5/18/2020  1:00 PM Lauro Cano MD [address] Maintenance  Health Maintenance Due   Topic Date Due    Breast cancer screen  08/04/2019       Mick Le RN

## 2020-02-11 NOTE — FLOWSHEET NOTE
02/11/20 1248   Encounter Summary   Services provided to: Patient   Referral/Consult From: Mike   Continue Visiting   (2/11/20)   Volunteer Visit Yes   Complexity of Encounter Low   Length of Encounter 15 minutes   Routine   Intervention Provided reading materials/devotional materials

## 2020-02-11 NOTE — PROGRESS NOTES
Physical Therapy    Facility/Department: [facility] MED SURG  Initial Assessment    NAME: Clement Hua  : 1950  MRN: [id number]    Date of Service: 2020    Discharge Recommendations: The patient would benefit from additional Physical Therapy after discharge from the facility at an Outpatient level of care. Assessment   Body structures, Functions, Activity limitations: Decreased functional mobility ; Decreased strength;Decreased safe awareness;Decreased cognition;Decreased balance; Increased pain;Decreased posture  Assessment: Pt with deficts in bed mobility/gait, not safe to return home. Ptseems to have Left side weakness, with decreased balance and have left side lean. Pt seems to need increased processing time. Treatment Diagnosis: Impaired function. Prognosis: Fair  Decision Making: Medium Complexity  REQUIRES PT FOLLOW UP: Yes  Activity Tolerance  Activity Tolerance: Patient limited by pain; Patient limited by endurance; Other(Needs increased time to process information.)       Patient Diagnosis(es): The encounter diagnosis was Pain.     has a past medical history of Allergic rhinitis, cause unspecified, Back pain, Bowel obstruction (HCC), C. difficile diarrhea, CAD (coronary artery disease), Cellulitis, Cellulitis, Diverticulosis of colon (without mention of hemorrhage), GERD (gastroesophageal reflux disease), History of MI (myocardial infarction), History of ovarian cyst, History of peritonitis, HTN (hypertension), Hx of blood clots, Hyperlipidemia, Intestinal or peritoneal adhesions with obstruction (postoperative) (postinfection) ([facility] [address] Kidney infection, Lateral epicondylitis  of elbow, Muscle strain, Other abnormal glucose, PONV (postoperative nausea and vomiting), Pre-diabetes, Restless legs syndrome (RLS), TIA (transient ischemic attack), Vitamin D deficiency, and Wears glasses. has a past surgical history that includes Ovary removal (); colectomy (); Appendectomy ();  Ovary Eliud President

## 2020-02-12 ENCOUNTER — HOSPITAL ENCOUNTER (INPATIENT)
Age: 70
LOS: 12 days | Discharge: HOME HEALTH | DRG: 560 | End: 2020-02-24
Attending: PHYSICAL MEDICINE & REHABILITATION | Admitting: PHYSICAL MEDICINE & REHABILITATION
Payer: MEDICARE

## 2020-02-12 VITALS
TEMPERATURE: 98.8 F | WEIGHT: 170.86 LBS | HEART RATE: 75 BPM | BODY MASS INDEX: 29.17 KG/M2 | RESPIRATION RATE: 16 BRPM | SYSTOLIC BLOOD PRESSURE: 142 MMHG | HEIGHT: 64 IN | OXYGEN SATURATION: 94 % | DIASTOLIC BLOOD PRESSURE: 40 MMHG

## 2020-02-12 PROBLEM — G98.8 NEUROLOGICAL DISORDER: Status: ACTIVE | Noted: 2020-02-12

## 2020-02-12 LAB
GLUCOSE BLD-MCNC: 118 MG/DL (ref 65–105)
HCT VFR BLD CALC: 25.1 % (ref 36–46)
HEMOGLOBIN: 8.4 G/DL (ref 12–16)
MCH RBC QN AUTO: 31.4 PG (ref 26–34)
MCHC RBC AUTO-ENTMCNC: 33.4 G/DL (ref 31–37)
MCV RBC AUTO: 93.9 FL (ref 80–100)
NRBC AUTOMATED: ABNORMAL PER 100 WBC
PDW BLD-RTO: 14.1 % (ref 11.5–14.9)
PLATELET # BLD: 284 K/UL (ref 150–450)
PMV BLD AUTO: 7.5 FL (ref 6–12)
RBC # BLD: 2.67 M/UL (ref 4–5.2)
WBC # BLD: 9.8 K/UL (ref 3.5–11)

## 2020-02-12 PROCEDURE — 96366 THER/PROPH/DIAG IV INF ADDON: CPT

## 2020-02-12 PROCEDURE — 6370000000 HC RX 637 (ALT 250 FOR IP): Performed by: INTERNAL MEDICINE

## 2020-02-12 PROCEDURE — 1180000000 HC REHAB R&B

## 2020-02-12 PROCEDURE — 97535 SELF CARE MNGMENT TRAINING: CPT

## 2020-02-12 PROCEDURE — 97530 THERAPEUTIC ACTIVITIES: CPT

## 2020-02-12 PROCEDURE — 99231 SBSQ HOSP IP/OBS SF/LOW 25: CPT | Performed by: INTERNAL MEDICINE

## 2020-02-12 PROCEDURE — 6360000002 HC RX W HCPCS: Performed by: INTERNAL MEDICINE

## 2020-02-12 PROCEDURE — 82947 ASSAY GLUCOSE BLOOD QUANT: CPT

## 2020-02-12 PROCEDURE — 6370000000 HC RX 637 (ALT 250 FOR IP): Performed by: PHYSICAL MEDICINE & REHABILITATION

## 2020-02-12 PROCEDURE — 99024 POSTOP FOLLOW-UP VISIT: CPT | Performed by: ORTHOPAEDIC SURGERY

## 2020-02-12 PROCEDURE — 2580000003 HC RX 258: Performed by: ORTHOPAEDIC SURGERY

## 2020-02-12 PROCEDURE — 6370000000 HC RX 637 (ALT 250 FOR IP): Performed by: ORTHOPAEDIC SURGERY

## 2020-02-12 PROCEDURE — G0378 HOSPITAL OBSERVATION PER HR: HCPCS

## 2020-02-12 PROCEDURE — G0379 DIRECT REFER HOSPITAL OBSERV: HCPCS | Performed by: ANESTHESIOLOGY

## 2020-02-12 PROCEDURE — 6360000002 HC RX W HCPCS: Performed by: ORTHOPAEDIC SURGERY

## 2020-02-12 PROCEDURE — 36415 COLL VENOUS BLD VENIPUNCTURE: CPT

## 2020-02-12 PROCEDURE — 97110 THERAPEUTIC EXERCISES: CPT

## 2020-02-12 PROCEDURE — 85027 COMPLETE CBC AUTOMATED: CPT

## 2020-02-12 PROCEDURE — 2580000003 HC RX 258: Performed by: INTERNAL MEDICINE

## 2020-02-12 PROCEDURE — 97116 GAIT TRAINING THERAPY: CPT

## 2020-02-12 PROCEDURE — G0378 HOSPITAL OBSERVATION PER HR: HCPCS | Performed by: ANESTHESIOLOGY

## 2020-02-12 PROCEDURE — 93970 EXTREMITY STUDY: CPT

## 2020-02-12 RX ORDER — FERROUS SULFATE 325(65) MG
325 TABLET ORAL
Status: DISCONTINUED | OUTPATIENT
Start: 2020-02-13 | End: 2020-02-24 | Stop reason: HOSPADM

## 2020-02-12 RX ORDER — LISINOPRIL 10 MG/1
10 TABLET ORAL DAILY
Status: CANCELLED | OUTPATIENT
Start: 2020-02-13

## 2020-02-12 RX ORDER — CITALOPRAM 10 MG/1
10 TABLET ORAL DAILY
Status: DISCONTINUED | OUTPATIENT
Start: 2020-02-13 | End: 2020-02-24 | Stop reason: HOSPADM

## 2020-02-12 RX ORDER — SODIUM CHLORIDE 0.9 % (FLUSH) 0.9 %
10 SYRINGE (ML) INJECTION PRN
Status: DISCONTINUED | OUTPATIENT
Start: 2020-02-12 | End: 2020-02-24 | Stop reason: HOSPADM

## 2020-02-12 RX ORDER — CARVEDILOL 12.5 MG/1
12.5 TABLET ORAL 2 TIMES DAILY
Status: DISCONTINUED | OUTPATIENT
Start: 2020-02-12 | End: 2020-02-23

## 2020-02-12 RX ORDER — FUROSEMIDE 40 MG/1
40 TABLET ORAL DAILY
Status: CANCELLED | OUTPATIENT
Start: 2020-02-13

## 2020-02-12 RX ORDER — CARVEDILOL 12.5 MG/1
12.5 TABLET ORAL 2 TIMES DAILY
Status: CANCELLED | OUTPATIENT
Start: 2020-02-12

## 2020-02-12 RX ORDER — PRAMIPEXOLE DIHYDROCHLORIDE 1.5 MG/1
1.5 TABLET ORAL DAILY
Status: CANCELLED | OUTPATIENT
Start: 2020-02-12

## 2020-02-12 RX ORDER — FUROSEMIDE 40 MG/1
40 TABLET ORAL DAILY
Status: DISCONTINUED | OUTPATIENT
Start: 2020-02-13 | End: 2020-02-19

## 2020-02-12 RX ORDER — SENNA AND DOCUSATE SODIUM 50; 8.6 MG/1; MG/1
1 TABLET, FILM COATED ORAL 2 TIMES DAILY
Status: DISCONTINUED | OUTPATIENT
Start: 2020-02-12 | End: 2020-02-24 | Stop reason: HOSPADM

## 2020-02-12 RX ORDER — OXYCODONE HYDROCHLORIDE AND ACETAMINOPHEN 5; 325 MG/1; MG/1
1 TABLET ORAL EVERY 6 HOURS PRN
Qty: 28 TABLET | Refills: 0 | Status: ON HOLD | OUTPATIENT
Start: 2020-02-12 | End: 2020-02-24 | Stop reason: HOSPADM

## 2020-02-12 RX ORDER — PRAMIPEXOLE DIHYDROCHLORIDE 1.5 MG/1
1.5 TABLET ORAL DAILY
Status: DISCONTINUED | OUTPATIENT
Start: 2020-02-12 | End: 2020-02-24 | Stop reason: HOSPADM

## 2020-02-12 RX ORDER — BISACODYL 10 MG
10 SUPPOSITORY, RECTAL RECTAL DAILY PRN
Status: DISCONTINUED | OUTPATIENT
Start: 2020-02-12 | End: 2020-02-24 | Stop reason: HOSPADM

## 2020-02-12 RX ORDER — OXYCODONE HYDROCHLORIDE 5 MG/1
5 TABLET ORAL EVERY 4 HOURS PRN
Status: DISCONTINUED | OUTPATIENT
Start: 2020-02-12 | End: 2020-02-24 | Stop reason: HOSPADM

## 2020-02-12 RX ORDER — DOCUSATE SODIUM 100 MG/1
100 CAPSULE, LIQUID FILLED ORAL 2 TIMES DAILY
Status: DISCONTINUED | OUTPATIENT
Start: 2020-02-12 | End: 2020-02-13

## 2020-02-12 RX ORDER — CITALOPRAM 20 MG/1
10 TABLET ORAL DAILY
Status: CANCELLED | OUTPATIENT
Start: 2020-02-13

## 2020-02-12 RX ORDER — OXYCODONE HYDROCHLORIDE 5 MG/1
5 TABLET ORAL EVERY 4 HOURS PRN
Status: CANCELLED | OUTPATIENT
Start: 2020-02-12

## 2020-02-12 RX ORDER — FENOFIBRATE 160 MG/1
160 TABLET ORAL
Status: CANCELLED | OUTPATIENT
Start: 2020-02-13

## 2020-02-12 RX ORDER — ONDANSETRON 4 MG/1
4 TABLET, FILM COATED ORAL DAILY PRN
Status: DISCONTINUED | OUTPATIENT
Start: 2020-02-12 | End: 2020-02-24 | Stop reason: HOSPADM

## 2020-02-12 RX ORDER — FERROUS SULFATE 325(65) MG
325 TABLET ORAL
Status: CANCELLED | OUTPATIENT
Start: 2020-02-13

## 2020-02-12 RX ORDER — NITROGLYCERIN 0.4 MG/1
0.4 TABLET SUBLINGUAL EVERY 5 MIN PRN
Status: CANCELLED | OUTPATIENT
Start: 2020-02-12

## 2020-02-12 RX ORDER — PANTOPRAZOLE SODIUM 40 MG/1
40 TABLET, DELAYED RELEASE ORAL
Status: CANCELLED | OUTPATIENT
Start: 2020-02-13

## 2020-02-12 RX ORDER — OXYCODONE HYDROCHLORIDE 5 MG/1
10 TABLET ORAL EVERY 4 HOURS PRN
Status: DISCONTINUED | OUTPATIENT
Start: 2020-02-12 | End: 2020-02-24 | Stop reason: HOSPADM

## 2020-02-12 RX ORDER — PANTOPRAZOLE SODIUM 40 MG/1
40 TABLET, DELAYED RELEASE ORAL
Status: DISCONTINUED | OUTPATIENT
Start: 2020-02-13 | End: 2020-02-24 | Stop reason: HOSPADM

## 2020-02-12 RX ORDER — ATORVASTATIN CALCIUM 40 MG/1
40 TABLET, FILM COATED ORAL NIGHTLY
Status: CANCELLED | OUTPATIENT
Start: 2020-02-12

## 2020-02-12 RX ORDER — OXYCODONE HYDROCHLORIDE 10 MG/1
10 TABLET ORAL EVERY 4 HOURS PRN
Status: CANCELLED | OUTPATIENT
Start: 2020-02-12

## 2020-02-12 RX ORDER — LIDOCAINE 50 MG/G
OINTMENT TOPICAL PRN
Status: DISCONTINUED | OUTPATIENT
Start: 2020-02-12 | End: 2020-02-24 | Stop reason: HOSPADM

## 2020-02-12 RX ORDER — FENOFIBRATE 160 MG/1
160 TABLET ORAL
Status: DISCONTINUED | OUTPATIENT
Start: 2020-02-13 | End: 2020-02-24 | Stop reason: HOSPADM

## 2020-02-12 RX ORDER — SODIUM CHLORIDE 0.9 % (FLUSH) 0.9 %
10 SYRINGE (ML) INJECTION EVERY 12 HOURS SCHEDULED
Status: CANCELLED | OUTPATIENT
Start: 2020-02-12

## 2020-02-12 RX ORDER — ATORVASTATIN CALCIUM 40 MG/1
40 TABLET, FILM COATED ORAL NIGHTLY
Status: DISCONTINUED | OUTPATIENT
Start: 2020-02-12 | End: 2020-02-24 | Stop reason: HOSPADM

## 2020-02-12 RX ORDER — SODIUM CHLORIDE 0.9 % (FLUSH) 0.9 %
10 SYRINGE (ML) INJECTION EVERY 12 HOURS SCHEDULED
Status: DISCONTINUED | OUTPATIENT
Start: 2020-02-12 | End: 2020-02-16

## 2020-02-12 RX ORDER — HYDRALAZINE HYDROCHLORIDE 50 MG/1
50 TABLET, FILM COATED ORAL 3 TIMES DAILY
Status: DISCONTINUED | OUTPATIENT
Start: 2020-02-12 | End: 2020-02-14

## 2020-02-12 RX ORDER — HYDRALAZINE HYDROCHLORIDE 50 MG/1
50 TABLET, FILM COATED ORAL 3 TIMES DAILY
Status: CANCELLED | OUTPATIENT
Start: 2020-02-12

## 2020-02-12 RX ORDER — ONDANSETRON 4 MG/1
4 TABLET, FILM COATED ORAL DAILY PRN
Status: CANCELLED | OUTPATIENT
Start: 2020-02-12

## 2020-02-12 RX ORDER — SODIUM CHLORIDE 0.9 % (FLUSH) 0.9 %
10 SYRINGE (ML) INJECTION PRN
Status: CANCELLED | OUTPATIENT
Start: 2020-02-12

## 2020-02-12 RX ORDER — POLYETHYLENE GLYCOL 3350 17 G/17G
17 POWDER, FOR SOLUTION ORAL DAILY
Status: DISCONTINUED | OUTPATIENT
Start: 2020-02-12 | End: 2020-02-24 | Stop reason: HOSPADM

## 2020-02-12 RX ORDER — DOCUSATE SODIUM 100 MG/1
100 CAPSULE, LIQUID FILLED ORAL 2 TIMES DAILY
Status: CANCELLED | OUTPATIENT
Start: 2020-02-12

## 2020-02-12 RX ORDER — NITROGLYCERIN 0.4 MG/1
0.4 TABLET SUBLINGUAL EVERY 5 MIN PRN
Status: DISCONTINUED | OUTPATIENT
Start: 2020-02-12 | End: 2020-02-24 | Stop reason: HOSPADM

## 2020-02-12 RX ORDER — LISINOPRIL 10 MG/1
10 TABLET ORAL DAILY
Status: DISCONTINUED | OUTPATIENT
Start: 2020-02-13 | End: 2020-02-17

## 2020-02-12 RX ADMIN — OXYCODONE HYDROCHLORIDE 5 MG: 5 TABLET ORAL at 21:50

## 2020-02-12 RX ADMIN — CARVEDILOL 12.5 MG: 12.5 TABLET, FILM COATED ORAL at 21:50

## 2020-02-12 RX ADMIN — OXYCODONE HYDROCHLORIDE 5 MG: 5 TABLET ORAL at 13:54

## 2020-02-12 RX ADMIN — MAGNESIUM CITRATE 296 ML: 1.75 LIQUID ORAL at 08:25

## 2020-02-12 RX ADMIN — CITALOPRAM HYDROBROMIDE 10 MG: 20 TABLET ORAL at 08:23

## 2020-02-12 RX ADMIN — LINAGLIPTIN 5 MG: 5 TABLET, FILM COATED ORAL at 08:24

## 2020-02-12 RX ADMIN — SENNOSIDES AND DOCUSATE SODIUM 1 TABLET: 8.6; 5 TABLET ORAL at 21:50

## 2020-02-12 RX ADMIN — OXYCODONE HYDROCHLORIDE 10 MG: 10 TABLET ORAL at 08:44

## 2020-02-12 RX ADMIN — HYDRALAZINE HYDROCHLORIDE 50 MG: 50 TABLET, FILM COATED ORAL at 21:50

## 2020-02-12 RX ADMIN — PANTOPRAZOLE SODIUM 40 MG: 40 TABLET, DELAYED RELEASE ORAL at 06:33

## 2020-02-12 RX ADMIN — Medication 1 TABLET: at 08:23

## 2020-02-12 RX ADMIN — PRAMIPEXOLE DIHYDROCHLORIDE 1.5 MG: 1.5 TABLET ORAL at 21:50

## 2020-02-12 RX ADMIN — ATORVASTATIN CALCIUM 40 MG: 40 TABLET, FILM COATED ORAL at 21:50

## 2020-02-12 RX ADMIN — FERROUS SULFATE TAB 325 MG (65 MG ELEMENTAL FE) 325 MG: 325 (65 FE) TAB at 08:26

## 2020-02-12 RX ADMIN — LISINOPRIL 10 MG: 10 TABLET ORAL at 08:24

## 2020-02-12 RX ADMIN — FENOFIBRATE 160 MG: 160 TABLET ORAL at 06:33

## 2020-02-12 RX ADMIN — Medication 10 ML: at 08:25

## 2020-02-12 RX ADMIN — Medication 1 TABLET: at 21:50

## 2020-02-12 RX ADMIN — DOCUSATE SODIUM 100 MG: 100 CAPSULE, LIQUID FILLED ORAL at 21:50

## 2020-02-12 RX ADMIN — PANTOPRAZOLE SODIUM 40 MG: 40 TABLET, DELAYED RELEASE ORAL at 16:30

## 2020-02-12 RX ADMIN — IRON SUCROSE 200 MG: 20 INJECTION, SOLUTION INTRAVENOUS at 13:46

## 2020-02-12 RX ADMIN — OXYCODONE HYDROCHLORIDE 10 MG: 10 TABLET ORAL at 03:51

## 2020-02-12 RX ADMIN — CARVEDILOL 12.5 MG: 12.5 TABLET, FILM COATED ORAL at 08:24

## 2020-02-12 RX ADMIN — DOCUSATE SODIUM 100 MG: 100 CAPSULE, LIQUID FILLED ORAL at 08:24

## 2020-02-12 ASSESSMENT — PAIN DESCRIPTION - LOCATION
LOCATION: BACK

## 2020-02-12 ASSESSMENT — PAIN DESCRIPTION - ORIENTATION
ORIENTATION: LOWER

## 2020-02-12 ASSESSMENT — PAIN DESCRIPTION - DESCRIPTORS
DESCRIPTORS: ACHING;SHARP
DESCRIPTORS: ACHING
DESCRIPTORS: ACHING;DISCOMFORT
DESCRIPTORS: ACHING
DESCRIPTORS: ACHING;SHARP
DESCRIPTORS: ACHING

## 2020-02-12 ASSESSMENT — PAIN SCALES - GENERAL
PAINLEVEL_OUTOF10: 4
PAINLEVEL_OUTOF10: 6
PAINLEVEL_OUTOF10: 9
PAINLEVEL_OUTOF10: 8
PAINLEVEL_OUTOF10: 10
PAINLEVEL_OUTOF10: 5
PAINLEVEL_OUTOF10: 2
PAINLEVEL_OUTOF10: 6
PAINLEVEL_OUTOF10: 7
PAINLEVEL_OUTOF10: 4
PAINLEVEL_OUTOF10: 7

## 2020-02-12 ASSESSMENT — PAIN DESCRIPTION - PROGRESSION
CLINICAL_PROGRESSION: NOT CHANGED

## 2020-02-12 ASSESSMENT — PAIN DESCRIPTION - FREQUENCY
FREQUENCY: CONTINUOUS

## 2020-02-12 ASSESSMENT — PAIN DESCRIPTION - PAIN TYPE
TYPE: SURGICAL PAIN

## 2020-02-12 ASSESSMENT — PAIN DESCRIPTION - ONSET
ONSET: ON-GOING

## 2020-02-12 NOTE — PROGRESS NOTES
Writer verified with Dr. Elnea Kohler, patient is to have no anticoagulation for 1 week. It is ok to resume anticoagulation 2/20/20.

## 2020-02-12 NOTE — PROGRESS NOTES
[facility]   Acute Inpatient Rehab Preadmission Assessment    Patient Name: Kaya Funk        MRN: [id number]    : 1950  (72 y.o.)  Gender: female     Admitted from:   [address] [place] [address]  [][facility]   []Outside Admission - Location:                                 [x]Initial         []Updated    Date of Onset / admission to the acute hospital:  2/10/20    Impairment group:  3.9 Neurologic Conditions    Did patient have surgery? []No  [x]Yes:  PROCEDURE PERFORMED:  L4-5 posterior decompression, posterior  instrumented fusion, posterior interbody fusion with application of  intervertebral body fusion cage, vertebroplasty L4, vertebroplasty L5,  autograft, crushed cortical cancellous allograft, fluoroscopic  assistance. Physicians: Lacy Sanchez    Risk for clinical complications:   Moderate    Co-morbidities:     Allergic rhinitis, cause unspecified      Back pain       lumbar    Bowel obstruction (HCC)       history of due to scar tissue, resolved non-surgically    C. difficile diarrhea      CAD (coronary artery disease)      Cellulitis       left leg    Cellulitis 2017     leg left leg/bug bite    Diverticulosis of colon (without mention of hemorrhage)      GERD (gastroesophageal reflux disease)      History of MI (myocardial infarction)      thought due to a blood clot    History of ovarian cyst      had oopherectomy    History of peritonitis      due to ruptured appendix age 13    HTN (hypertension)      Hx of blood clots       right leg    Hyperlipidemia      Intestinal or peritoneal adhesions with obstruction (postoperative) (postinfection) (HCC)      Kidney infection       renal failure/sepsis/spider bite    Lateral epicondylitis  of elbow      Muscle strain       right posterior shoulder    Other abnormal glucose      PONV (postoperative nausea and vomiting)       dry heaves    Pre-diabetes      Restless legs syndrome (RLS)      TIA

## 2020-02-12 NOTE — PROGRESS NOTES
Physical Therapy  Facility/Department: [facility] MED SURG  Daily Treatment Note  NAME: Trang   : 1950  MRN: [id number]    Date of Service: 2020    Discharge Recommendations:  Patient would benefit from continued therapy after discharge        Assessment   Body structures, Functions, Activity limitations: Decreased functional mobility ; Decreased strength;Decreased safe awareness;Decreased cognition;Decreased balance; Increased pain;Decreased posture  Assessment: Pt requires Mod A for all bed mobility this date. Pain is limiting pt. Pt continues to demo slight left side weakness and decrease processing. Pt would benefit from additional PT to increase strength, endurance, and independence with functional mobility for a safe D/C home. Pt's  reports his uncle in [address] is not doing well and will be leaving to see him. REQUIRES PT FOLLOW UP: Yes  Activity Tolerance  Activity Tolerance: Patient limited by fatigue;Patient limited by pain; Patient limited by endurance     Patient Diagnosis(es): The primary encounter diagnosis was Facet arthritis of lumbar region. Diagnoses of Pain, Spondylosis of lumbar region without myelopathy or radiculopathy, and Neurogenic claudication due to lumbar spinal stenosis were also pertinent to this visit.      has a past medical history of Allergic rhinitis, cause unspecified, Back pain, Bowel obstruction (HCC), C. difficile diarrhea, CAD (coronary artery disease), Cellulitis, Cellulitis, Diverticulosis of colon (without mention of hemorrhage), GERD (gastroesophageal reflux disease), History of MI (myocardial infarction), History of ovarian cyst, History of peritonitis, HTN (hypertension), Hx of blood clots, Hyperlipidemia, Intestinal or peritoneal adhesions with obstruction (postoperative) (postinfection) ([address] Kidney infection, Lateral epicondylitis  of elbow, Muscle strain, Other abnormal glucose, PONV (postoperative nausea and vomiting), Pre-diabetes, Signs  Patient Currently in Pain: Yes  Oxygen Therapy  O2 Device: None (Room air)       Orientation  Orientation  Overall Orientation Status: Within Functional Limits  Objective   Bed mobility  Rolling to Left: Moderate assistance  Rolling to Right: Moderate assistance  Supine to Sit: Moderate assistance  Sit to Supine: Moderate assistance  Scooting: Moderate assistance;2 Person assistance(to HOB)  Comment: Pt requires vc's/tc's for technique. Pt is limited d/t pain this date. Pt demos slow movements to complete. Pt educated in log roll technique with fair carryover. Pt requires Mod A x1 to complete log roll/bed mob this PM.   Transfers  Sit to Stand: Contact guard assistance;Minimal Assistance  Stand to sit: Contact guard assistance;Minimal Assistance  Comment: All transfers completed with RW. Pt requires vc's for RW management/technique. PM: Pt continues to require vc's for safe hand placement, fair + carryover. Pt demos slow movements to complete. Pt's BP supine is 100/40, sitting EOB 91/51, and standing is 104/34. Pt's HR maintain 73 during transfers. Defer amb d/t BP. Pt insisted to use restsroom however agreeable to [facility] for safety concerns. Pt returned to bed and BP is 114/47 and HR is 76. Ambulation  Ambulation?: Yes  More Ambulation?: (defer amb this PM d/t low BP. )  Ambulation 1  Surface: level tile  Device: Rolling Walker  Assistance: Contact guard assistance  Quality of Gait: Pt demos very decrease step length/tawanda, forward flexed posture, and pushing RW slightly out of KAREEM. Gait Deviations: Decreased step length;Decreased step height  Distance: 50'x2  Comments: Pt educated/instructed in proper technique with fair carryover. Stairs/Curb  Stairs?: (defer d/t low BP. )     Balance  Posture: Fair  Sitting - Static: -;Good  Sitting - Dynamic: Fair;+  Standing - Static: Fair  Standing - Dynamic: Fair;-  Comments: standing balance assessed with RW.    Other exercises  Other exercises?: Yes  Other

## 2020-02-12 NOTE — CONSULTS
use included cigarettes. She started smoking about 24 years ago. She has a 10.00 pack-year smoking history. She has never used smokeless tobacco.  Alcohol:      reports no history of alcohol use. Drug Use:  reports no history of drug use. Family History:     Family History   Problem Relation Age of Onset    Stroke Mother     Diabetes Mother     Heart Disease Mother     High Blood Pressure Mother     Heart Disease Father     Heart Disease Brother     High Blood Pressure Brother     Heart Disease Maternal Grandmother     High Blood Pressure Sister        Review of Systems:     Positive and Negative as described in HPI. CONSTITUTIONAL:  negative for fevers, chills, sweats, fatigue, weight loss  HEENT:  negative for vision, hearing changes, runny nose, throat pain  RESPIRATORY:  negative for shortness of breath, cough, congestion, wheezing. CARDIOVASCULAR:  negative for chest pain, palpitations.   GASTROINTESTINAL:  negative for nausea, vomiting, diarrhea, constipation, change in bowel habits, abdominal pain   GENITOURINARY:  negative for difficulty of urination, burning with urination, frequency   INTEGUMENT:  negative for rash, skin lesions, easy bruising   HEMATOLOGIC/LYMPHATIC:  negative for swelling/edema   ALLERGIC/IMMUNOLOGIC:  negative for urticaria , itching  ENDOCRINE:  negative increase in drinking, increase in urination, hot or cold intolerance  MUSCULOSKELETAL:  negative joint pains, muscle aches, swelling of joints  Back pain post oop    NEUROLOGICAL:  negative for headaches, dizziness, lightheadedness, numbness, pain, tingling extremities  BEHAVIOR/PSYCH:  negative for depression, anxiety    Physical Exam:     BP (!) 130/51   Pulse 62   Temp 98.4 °F (36.9 °C) (Oral)   Resp 14   Ht 5' 4\" (1.626 m)   Wt 167 lb 8.8 oz (76 kg)   SpO2 97%   BMI 28.76 kg/m²   Temp (24hrs), Av.9 °F (36.6 °C), Min:97.2 °F (36.2 °C), Max:98.6 °F (37 °C)    Recent Labs     02/10/20  1020 02/10/20  1430
(36.6 °C) (Oral)   Resp 14   Ht 5' 4\" (1.626 m)   Wt 170 lb 13.7 oz (77.5 kg)   SpO2 94%   BMI 29.33 kg/m²   Temp (24hrs), Av.3 °F (36.8 °C), Min:97.7 °F (36.5 °C), Max:99.3 °F (37.4 °C)    Recent Labs     20  0636 20  1626 20  0622   POCGLU 103 176* 144* 118*       Intake/Output Summary (Last 24 hours) at 2020 1015  Last data filed at 2020 1010  Gross per 24 hour   Intake 1295 ml   Output 1200 ml   Net 95 ml       General Appearance:  alert, well appearing, and in no acute distress  Mental status: oriented to person, place, and time with normal affect  Head:  normocephalic, atraumatic. Eye: no icterus, redness, pupils equal and reactive, extraocular eye movements intact, conjunctiva clear  Ear: normal external ear, no discharge, hearing intact  Nose:  no drainage noted  Mouth: mucous membranes moist  Neck: supple, no carotid bruits, thyroid not palpable  Lungs: Bilateral equal air entry, clear to ausculation, no wheezing, rales or rhonchi, normal effort  Cardiovascular: normal rate, regular rhythm, no murmur, gallop, rub.   Abdomen: Soft, nontender, nondistended, normal bowel sounds, no hepatomegaly or splenomegaly  Neurologic: There are no new focal motor or sensory deficits, normal muscle tone and bulk, no abnormal sensation, normal speech, cranial nerves II through XII grossly intact  Skin: No gross lesions, rashes, bruising or bleeding on exposed skin area  Extremities:  peripheral pulses palpable, no pedal edema or calf pain with palpation  Post op back pain    Psych: normal affect    Investigations:      Laboratory Testing:  Recent Results (from the past 24 hour(s))   POC Glucose Fingerstick    Collection Time: 20  4:26 PM   Result Value Ref Range    POC Glucose 176 (H) 65 - 105 mg/dL   POC Glucose Fingerstick    Collection Time: 20  8:11 PM   Result Value Ref Range    POC Glucose 144 (H) 65 - 105 mg/dL   POC Glucose Fingerstick    Collection

## 2020-02-12 NOTE — PROGRESS NOTES
toilet c CGA for toileting tasks due to impaired balance and impaired GMC of LUE. Impaired FMC/GMC of LUE and impaired motor planning of LUE as well as L neglect noted throughout AM and PM session. RN Eligio Schneider notified of Pt's Left sided deficits. Transfers  Sit to stand: Contact guard assistance  Stand to sit: Contact guard assistance  Transfer Comments: VCs for hand placement c P return  Toilet Transfers  Toilet - Technique: Ambulating  Equipment Used: Grab bars  Toilet Transfer: Contact guard assistance  Toilet Transfers Comments: PM: VCs for hand placement c P return                             Assessment  Performance deficits / Impairments: Decreased functional mobility ; Decreased ADL status; Decreased strength;Decreased safe awareness;Decreased endurance;Decreased cognition;Decreased balance;Decreased high-level IADLs;Decreased fine motor control;Decreased coordination;Decreased posture  Prognosis: Fair  Discharge Recommendations: Patient would benefit from continued therapy after discharge  Activity Tolerance: Patient Tolerated treatment well  Safety Devices in place: Yes  Type of devices: Call light within reach;Gait belt;Patient at risk for falls;Nurse notified; Left in bed  Equipment Recommendations  Equipment Needed: (TBD)          Patient Education: OT POC, BACK PRECAUTIONS, log rolling tech, home safety, RW safety during transfers and func mobility, safety during toilet transfer, AE to increase indep c LB dressing ie reacher, sock aid, LH shoehorn     Learner:patient  Method: demonstration, explanation and handout       Outcome: needs reinforcement     Plan  Safety Devices  Safety Devices in place: Yes  Type of devices: Call light within reach, Gait belt, Patient at risk for falls, Nurse notified, Left in bed  Plan  Times per week: 5-7  Times per day: Twice a day  Current Treatment Recommendations: Self-Care / ADL, Home Management Training, Strengthening, Balance Training, Functional Mobility Training,

## 2020-02-12 NOTE — PROGRESS NOTES
AMELIA referred pt to ARU per her request. Still await decision from MD. Pt's  needs to go to [facility] for a  and would to be notified of decision asapNickie Rubio Creed [phone]. Addendum: AMELIA informed pt's  of probable transfer this evening. Please refer to [facility] note prior to transferring pt to the unit.

## 2020-02-13 LAB
ANION GAP SERPL CALCULATED.3IONS-SCNC: 9 MMOL/L (ref 9–17)
BUN BLDV-MCNC: 24 MG/DL (ref 8–23)
BUN/CREAT BLD: ABNORMAL (ref 9–20)
CALCIUM SERPL-MCNC: 8.6 MG/DL (ref 8.6–10.4)
CHLORIDE BLD-SCNC: 104 MMOL/L (ref 98–107)
CO2: 27 MMOL/L (ref 20–31)
CREAT SERPL-MCNC: 0.65 MG/DL (ref 0.5–0.9)
GFR AFRICAN AMERICAN: >60 ML/MIN
GFR NON-AFRICAN AMERICAN: >60 ML/MIN
GFR SERPL CREATININE-BSD FRML MDRD: ABNORMAL ML/MIN/{1.73_M2}
GFR SERPL CREATININE-BSD FRML MDRD: ABNORMAL ML/MIN/{1.73_M2}
GLUCOSE BLD-MCNC: 121 MG/DL (ref 65–105)
GLUCOSE BLD-MCNC: 128 MG/DL (ref 65–105)
GLUCOSE BLD-MCNC: 133 MG/DL (ref 70–99)
GLUCOSE BLD-MCNC: 134 MG/DL (ref 65–105)
GLUCOSE BLD-MCNC: 93 MG/DL (ref 65–105)
GLUCOSE BLD-MCNC: 96 MG/DL (ref 65–105)
HCT VFR BLD CALC: 24.6 % (ref 36–46)
HEMOGLOBIN: 8.3 G/DL (ref 12–16)
MCH RBC QN AUTO: 31.1 PG (ref 26–34)
MCHC RBC AUTO-ENTMCNC: 33.7 G/DL (ref 31–37)
MCV RBC AUTO: 92.1 FL (ref 80–100)
NRBC AUTOMATED: ABNORMAL PER 100 WBC
PDW BLD-RTO: 14 % (ref 11.5–14.9)
PLATELET # BLD: 351 K/UL (ref 150–450)
PMV BLD AUTO: 7.1 FL (ref 6–12)
POTASSIUM SERPL-SCNC: 4.1 MMOL/L (ref 3.7–5.3)
RBC # BLD: 2.67 M/UL (ref 4–5.2)
SODIUM BLD-SCNC: 140 MMOL/L (ref 135–144)
WBC # BLD: 12.3 K/UL (ref 3.5–11)

## 2020-02-13 PROCEDURE — 2580000003 HC RX 258: Performed by: INTERNAL MEDICINE

## 2020-02-13 PROCEDURE — 6370000000 HC RX 637 (ALT 250 FOR IP): Performed by: INTERNAL MEDICINE

## 2020-02-13 PROCEDURE — 85027 COMPLETE CBC AUTOMATED: CPT

## 2020-02-13 PROCEDURE — 97150 GROUP THERAPEUTIC PROCEDURES: CPT

## 2020-02-13 PROCEDURE — 6370000000 HC RX 637 (ALT 250 FOR IP): Performed by: PHYSICAL MEDICINE & REHABILITATION

## 2020-02-13 PROCEDURE — 97166 OT EVAL MOD COMPLEX 45 MIN: CPT

## 2020-02-13 PROCEDURE — 6360000002 HC RX W HCPCS: Performed by: INTERNAL MEDICINE

## 2020-02-13 PROCEDURE — 82947 ASSAY GLUCOSE BLOOD QUANT: CPT

## 2020-02-13 PROCEDURE — 97535 SELF CARE MNGMENT TRAINING: CPT

## 2020-02-13 PROCEDURE — 97530 THERAPEUTIC ACTIVITIES: CPT

## 2020-02-13 PROCEDURE — 92523 SPEECH SOUND LANG COMPREHEN: CPT

## 2020-02-13 PROCEDURE — 1180000000 HC REHAB R&B

## 2020-02-13 PROCEDURE — 99223 1ST HOSP IP/OBS HIGH 75: CPT | Performed by: PHYSICAL MEDICINE & REHABILITATION

## 2020-02-13 PROCEDURE — 80048 BASIC METABOLIC PNL TOTAL CA: CPT

## 2020-02-13 PROCEDURE — 97116 GAIT TRAINING THERAPY: CPT

## 2020-02-13 PROCEDURE — 99222 1ST HOSP IP/OBS MODERATE 55: CPT | Performed by: INTERNAL MEDICINE

## 2020-02-13 PROCEDURE — 97162 PT EVAL MOD COMPLEX 30 MIN: CPT

## 2020-02-13 PROCEDURE — 36415 COLL VENOUS BLD VENIPUNCTURE: CPT

## 2020-02-13 RX ORDER — ACETAMINOPHEN 325 MG/1
650 TABLET ORAL EVERY 4 HOURS PRN
Status: DISCONTINUED | OUTPATIENT
Start: 2020-02-13 | End: 2020-02-24 | Stop reason: HOSPADM

## 2020-02-13 RX ADMIN — OXYCODONE HYDROCHLORIDE 10 MG: 5 TABLET ORAL at 12:42

## 2020-02-13 RX ADMIN — Medication 1 TABLET: at 07:40

## 2020-02-13 RX ADMIN — SENNOSIDES AND DOCUSATE SODIUM 1 TABLET: 8.6; 5 TABLET ORAL at 07:39

## 2020-02-13 RX ADMIN — LINAGLIPTIN 5 MG: 5 TABLET, FILM COATED ORAL at 07:40

## 2020-02-13 RX ADMIN — OXYCODONE HYDROCHLORIDE 5 MG: 5 TABLET ORAL at 01:07

## 2020-02-13 RX ADMIN — HYDRALAZINE HYDROCHLORIDE 50 MG: 50 TABLET, FILM COATED ORAL at 07:40

## 2020-02-13 RX ADMIN — Medication 10 ML: at 21:32

## 2020-02-13 RX ADMIN — OXYCODONE HYDROCHLORIDE 10 MG: 5 TABLET ORAL at 16:37

## 2020-02-13 RX ADMIN — CARVEDILOL 12.5 MG: 12.5 TABLET, FILM COATED ORAL at 21:29

## 2020-02-13 RX ADMIN — IRON SUCROSE 200 MG: 20 INJECTION, SOLUTION INTRAVENOUS at 14:49

## 2020-02-13 RX ADMIN — LISINOPRIL 10 MG: 10 TABLET ORAL at 07:41

## 2020-02-13 RX ADMIN — OXYCODONE HYDROCHLORIDE 10 MG: 5 TABLET ORAL at 07:46

## 2020-02-13 RX ADMIN — FERROUS SULFATE TAB 325 MG (65 MG ELEMENTAL FE) 325 MG: 325 (65 FE) TAB at 07:40

## 2020-02-13 RX ADMIN — POLYETHYLENE GLYCOL 3350 17 G: 17 POWDER, FOR SOLUTION ORAL at 07:47

## 2020-02-13 RX ADMIN — HYDRALAZINE HYDROCHLORIDE 50 MG: 50 TABLET, FILM COATED ORAL at 21:29

## 2020-02-13 RX ADMIN — PANTOPRAZOLE SODIUM 40 MG: 40 TABLET, DELAYED RELEASE ORAL at 14:48

## 2020-02-13 RX ADMIN — DOCUSATE SODIUM 100 MG: 100 CAPSULE, LIQUID FILLED ORAL at 07:44

## 2020-02-13 RX ADMIN — PRAMIPEXOLE DIHYDROCHLORIDE 1.5 MG: 1.5 TABLET ORAL at 21:29

## 2020-02-13 RX ADMIN — PANTOPRAZOLE SODIUM 40 MG: 40 TABLET, DELAYED RELEASE ORAL at 06:17

## 2020-02-13 RX ADMIN — SENNOSIDES AND DOCUSATE SODIUM 1 TABLET: 8.6; 5 TABLET ORAL at 21:29

## 2020-02-13 RX ADMIN — FENOFIBRATE 160 MG: 160 TABLET ORAL at 06:17

## 2020-02-13 RX ADMIN — CARVEDILOL 12.5 MG: 12.5 TABLET, FILM COATED ORAL at 07:41

## 2020-02-13 RX ADMIN — ATORVASTATIN CALCIUM 40 MG: 40 TABLET, FILM COATED ORAL at 21:29

## 2020-02-13 RX ADMIN — FUROSEMIDE 40 MG: 40 TABLET ORAL at 07:41

## 2020-02-13 RX ADMIN — Medication 1 TABLET: at 21:29

## 2020-02-13 RX ADMIN — CITALOPRAM HYDROBROMIDE 10 MG: 10 TABLET ORAL at 07:40

## 2020-02-13 ASSESSMENT — PAIN SCALES - GENERAL
PAINLEVEL_OUTOF10: 8
PAINLEVEL_OUTOF10: 6
PAINLEVEL_OUTOF10: 5
PAINLEVEL_OUTOF10: 6
PAINLEVEL_OUTOF10: 6
PAINLEVEL_OUTOF10: 8
PAINLEVEL_OUTOF10: 6

## 2020-02-13 ASSESSMENT — PAIN DESCRIPTION - DESCRIPTORS
DESCRIPTORS: TENDER;SORE
DESCRIPTORS: ACHING
DESCRIPTORS: TENDER
DESCRIPTORS: SORE;DISCOMFORT
DESCRIPTORS: ACHING

## 2020-02-13 ASSESSMENT — PAIN DESCRIPTION - PAIN TYPE
TYPE: ACUTE PAIN
TYPE: ACUTE PAIN;SURGICAL PAIN
TYPE: ACUTE PAIN;SURGICAL PAIN

## 2020-02-13 ASSESSMENT — PAIN DESCRIPTION - FREQUENCY
FREQUENCY: CONTINUOUS
FREQUENCY: INTERMITTENT
FREQUENCY: INTERMITTENT

## 2020-02-13 ASSESSMENT — PAIN DESCRIPTION - PROGRESSION
CLINICAL_PROGRESSION: NOT CHANGED

## 2020-02-13 ASSESSMENT — PAIN DESCRIPTION - ORIENTATION
ORIENTATION: LOWER

## 2020-02-13 ASSESSMENT — PAIN DESCRIPTION - LOCATION
LOCATION: BACK
LOCATION: LEG
LOCATION: LEG

## 2020-02-13 ASSESSMENT — PAIN DESCRIPTION - ONSET
ONSET: ON-GOING

## 2020-02-13 ASSESSMENT — PAIN SCALES - WONG BAKER: WONGBAKER_NUMERICALRESPONSE: 8

## 2020-02-13 NOTE — PLAN OF CARE
Problem: Risk for Impaired Skin Integrity  Goal: Tissue integrity - skin and mucous membranes  Description  Structural intactness and normal physiological function of skin and  mucous membranes. Outcome: Ongoing  Note:   No new areas of skin breakdown noted. Skin remains intact during shift. Skin to high risk pressure areas are clear. Moisture/protective barrier applied as needed to prevent skin breakdown. Problem: Falls - Risk of:  Goal: Will remain free from falls  Description  Will remain free from falls  Outcome: Ongoing  Note:   Patient remains free from falls so far during shift. Bed locked and in lowest position. Side rails up x3. Call light within reach. Hourly rounding completed during shift. Goal: Absence of physical injury  Description  Absence of physical injury  Outcome: Ongoing     Problem: Pain:  Goal: Pain level will decrease  Description  Pain level will decrease  Outcome: Ongoing  Note:   Pain controlled with prn and around the clock medications, see MAR. Safety maintained during shift.      Goal: Control of acute pain  Description  Control of acute pain  Outcome: Ongoing  Goal: Control of chronic pain  Description  Control of chronic pain  Outcome: Ongoing

## 2020-02-13 NOTE — CONSULTS
kg), % Ideal Body 146%  · BMI Classification: BMI 25.0 - 29.9 Overweight    Nutrition Interventions:   Continue current diet  Continued Inpatient Monitoring, Education Not Indicated    Nutrition Evaluation:   · Evaluation: Goals set   · Goals: PO intake % of most meals    · Monitoring: Meal Intake, Wound Healing, Pertinent Labs, Weight, Monitor Bowel Function    Harley Acuna R.D., L.KENDAL.   Phone: [phone]

## 2020-02-13 NOTE — PROGRESS NOTES
20 1032 20 1449   OT Individual Minutes   Time In [id number]   Time Out [address] 6896   ACUTE REHABILITATION OCCUPATIONAL THERAPY  DAILY NOTE    Date: 20  Patient Name: Pretty Ba      Room: 5231/6952-81    MRN: [id number]   : 1950  (72 y.o.)  Gender: female   Referring Practitioner: Dr. Rae/Dr. Omaira Phan  Diagnosis: Neurological disorder; Lumbar L4-5 PLIF Vertebroplasty L4 & L5 2/10/20       Restrictions  Restrictions/Precautions: Fall Risk, Surgical Protocols  Implants present? : Metal implants(L4-L5 PLIF 2/10/20, L wrist fx)  Other position/activity restrictions: up with assistance  Required Braces or Orthoses?: No(Reports brace for B hands- does not wear often)    Subjective \"I do all the cooking and laundry at my house\"   Patient Currently in Pain: Yes  Pain Level: 8  Pain Location: Leg     Overall Orientation Status: Within Functional Limits     Pain Assessment  Pain Level: 8  Pain Type: Acute pain  Pain Location: Leg    Objective Nsg notified about pain and pill given to address. Busy boards completed with min A due to decreased fine motor. Education about OT with questionable carryover. Continue POC      Balance  Sitting Balance: Supervision(4 to 5 min static )  Bed mobility  Rolling to Left: Stand by assistance  Scooting: Minimal assistance  Transfers  Stand Pivot Transfers: Minimal assistance  Sit to stand: Minimal assistance  Stand to sit: Minimal assistance                                              Assessment        Safety Devices in place: Yes  Type of devices: Call light within reach;Gait belt;Patient at risk for falls;Nurse notified; Left in chair          Patient Education:  Patient Goals   Patient goals :  \"To be able to do all the things I need to do\"  Learner:patient  Method: explanation       Outcome: demonstrated understanding   OT Education  OT Education: OT Role;Plan of Care;Transfer Training;Equipment  Patient Education: AE for LB dress     Plan  Plan  Times per week: 5-7  Times per day: Twice a day  Current Treatment Recommendations: Self-Care / ADL, Home Management Training, Strengthening, Balance Training, Functional Mobility Training, Endurance Training, Cognitive Reorientation, Pain Management, Safety Education & Training, Patient/Caregiver Education & Training, Equipment Evaluation, Education, & procurement, Cognitive/Perceptual Training  Patient Goals   Patient goals : \"To be able to do all the things I need to do\"  Short term goals  Time Frame for Short term goals: One week  Short term goal 1: Pt will V/D Good understanding of AE/DME/modified techniques for increased IND with self-care and mobility. Short term goal 2: Pt will perform UB ADLs with SUP and LB ADLs with SBA and Good safety. Short term goal 3: Pt will actively participate in 30+ minutes of therapeutic exercise/functional activities to promote increased IND with self-care and mobility. Short term goal 4: Pt will stand for 5+ minutes with 0-1 UE support, SBA, and no LOB while engaging in functional activity of choice. Short term goal 5: Pt will perform functional mobility and transfers during self-care consistently with SBA, least restrictive mobility device, and Good safety. Long term goals  Time Frame for Long term goals : By discharge  Long term goal 1: Pt will perform BADLs with MI and Good safety. Long term goal 2: Pt will perform functional mobility and transfers during self-care with MI, least restrictive mobility device, and Good safety. Long term goal 3: Pt will stand for 10+ minutes with 0-1 UE support, MI, and no LOB while engaging in functional activity of choice. Long term goal 4: Pt will V/D Good understanding of Fall Prevention strategies for increased safety and IND with self-care and mobility. Long term goal 5: Pt will perform simple meal prep/light housekeeping with MI and Good safety.

## 2020-02-13 NOTE — PLAN OF CARE
Nutrition Problem: Increased nutrient needs  Intervention: Food and/or Nutrient Delivery: Continue current diet  Nutritional Goals: PO intake % of most meals

## 2020-02-13 NOTE — PATIENT CARE CONFERENCE
[facility]: [facility]INT[facility]   ACUTE REHABILITATION  TEAM CONFERENCE NOTE  Date: 20  Patient Name: Patria Carrasco       Room: 9422/7388-61  MRN: [id number]       : 1950  (72 y.o.)     Gender: female   Referring Practitioner: Alma Delia Bowman MD   Neurological disorder [G98.8]  Diagnosis: Neurological disorder; Lumbar L4-5 PLIF Vertebroplasty L4 & L5 2/10/20     NURSING  Bladder  Continent  Bowel   Continent  Intervention    Both Bowel & Bladder Program     Wounds/Incisions/Ulcers: incisions healing well  Medication Education Program: Patient able to manage medications and being educated by nursing  Pain: Patient's pain is currently controlled with -  Tamera 5-10 q 4 hrs prn    Fall Risk:  Falling star program initiated    PHYSICAL THERAPY  Bed mobility  Rolling to Left: Stand by assistance  Rolling to Right: Stand by assistance  Supine to Sit: Minimal assistance  Sit to Supine: Moderate assistance  Scooting: Minimal assistance  Comment: HOB slight elevation, assist to place ice packs on pt at end of session in lumbar region. Bed Mobility  Scooting: Minimal assistance    Transfers:  Sit to Stand: Moderate Assistance;Minimal Assistance  Stand to sit: Moderate Assistance;Minimal Assistance  Bed to Chair: Minimal assistance    Ambulation 1  Surface: level tile  Device: Rolling Walker  Assistance: Minimal assistance; Moderate assistance  Quality of Gait: decreased step length on R, forward trunk and neck flexion, slow alicia, no LOB  Gait Deviations: Slow Alicia;Decreased step length  Distance: 2MWT: 30', 5' x2 bed to chair  Comments: After ambulation and w/c mobility pt transferring chair to bed and stopped, pt stating she was warm and nauseous, returned to bed and positioned pt - pt feeling better. MEENU Hannah notified.         Reason if not Attempted: Not attempted due to medical condition or safety concerns  CARE Score: 88  Discharge Goal: Supervision or touching assistance    Walk   Walk 10 Feet Partial/moderate assistance    3   Walk 50 feet with 2 Turns      88   Walk 150 Feet      88   Walking 10 feet Uneven Surface      88     Steps  1 Step (Curb)       88   4 Steps       88   12 Steps       88     Wheelchair Ability   Wheel 50 Feet - 2 turns           Wheel 150 Feet            Equipment Needed: No    Pt most limited by pain and activity tolerance at this time. Pt is requiring assist for all functional mobility. Pt has supportive  that can assist as needed at d/c. Goals  Time Frame for Short term goals: 1 week  Short term goal 1: Pt to demonstrate CGA/SBA bed mobility wtih good log rolling technique. Short term goal 2: Pt to demonstrate min/CGA x1 transfers with good technique. Short term goal 3: Pt to amb 76' with RW CGA. Short term goal 4: Pt to 3-5 steps, min x1 with 1-2 HR. Short term goal 5: Pt to improve standing tolerance to at least 4-5 minutes with 1 UE support, CGA/SBA. Short term goal 6: Pt to improve sitting balance (Static/dynamic) to Good with good posture. OCCUPATIONAL THERAPY  ADL  Feeding: Setup; Increased time to complete  Grooming: Stand by assistance;Setup; Increased time to complete(while seated at sink in w/c)  UE Bathing: Stand by assistance;Setup;Verbal cueing; Increased time to complete(VCs for thoroughness)  LE Bathing: Contact guard assistance;Setup;Verbal cueing; Increased time to complete(CGA while standing at sink; VCs for thoroughness & safety)  UE Dressing: Minimal assistance;Setup;Verbal cueing; Increased time to complete(A to hook bra)  LE Dressing: Moderate assistance;Setup;Verbal cueing; Increased time to complete(A to thread RLE into pants, don B TEDs & adjust B shoes)  Toileting: Contact guard assistance;Setup; Increased time to complete(CGA while standing)  Additional Comments: Pt engaged in bathing, dressing, grooming and toileting tasks this date. Pt declined shower and requested to bathe at sink while seated in w/c.  Min VCs for thoroughness and safety. Bed mobility  Rolling to Left: Stand by assistance  Rolling to Right: Moderate assistance  Supine to Sit: Moderate assistance  Sit to Supine: Moderate assistance  Scooting: Minimal assistance  Comment: Pt seated in w/c at end of session        Balance  Sitting Balance: Supervision(4 to 5 min static )  Standing Balance: Contact guard assistance  Standing Balance  Time: AM: 1-2 minutes x 4  Activity: AM: self-care tasks  Comment: 0-1 UE support            Short term goals  Time Frame for Short term goals: One week  Short term goal 1: Pt will V/D Good understanding of AE/DME/modified techniques for increased IND with self-care and mobility. Short term goal 2: Pt will perform UB ADLs with SUP and LB ADLs with SBA and Good safety. Short term goal 3: Pt will actively participate in 30+ minutes of therapeutic exercise/functional activities to promote increased IND with self-care and mobility. Short term goal 4: Pt will stand for 5+ minutes with 0-1 UE support, SBA, and no LOB while engaging in functional activity of choice. Short term goal 5: Pt will perform functional mobility and transfers during self-care consistently with SBA, least restrictive mobility device, and Good safety. SPEECH THERAPY  Supervision for comprehension, Supervision for memory, Supervision for problem solving  Short Term Goal: Mod I for comprehension, Mod I for memory, Mod I for problem solving     RECREATIONAL THERAPY  Comment/Participation: Participating in unit program      NUTRITION  Weight: 168 lb 3.2 oz (76.3 kg) / Body mass index is 29.8 kg/m². Diet Rx: General. Overall has been eating well although did not eat breakfast today. Will continue to monitor. Please see nutrition note for details.     SOCIAL WORK ASSESSMENT  Assessment:    Pre-Admission Status:  Lives With: Spouse  Type of Home: House  Home Layout: One level, Laundry in basement(12 steps, L HR - pt's  carries laundry down)  Home Access: Stairs to enter without rails  Entrance Stairs - Number of Steps: 1  Bathroom Shower/Tub: Tub/Shower unit, Doors, Shower chair with back  [employer]: Standard  Bathroom Equipment: Toilet raiser, Grab bars around toilet, Grab bars in shower, Shower chair, Hand-held shower  Bathroom Accessibility: Walker accessible  Home Equipment: Cane, Rolling walker(3WW)  ADL Assistance: Independent  Homemaking Assistance: Independent  Homemaking Responsibilities: Yes  Ambulation Assistance: Independent(with cane)  Transfer Assistance: Independent  Active : Yes  Mode of Transportation: SUV  Occupation: Retired  Type of occupation:   Additional Comments: Pt has hx of multiple falls. Pt's  in good health, able to physically assist. Pt's spouse is retired and able to provide 24/7 assist if needed upon d/c. Family Education: Need to make contact with family to initiate education    Risk for Readmission: High 14>   Readmission Risk              Risk of Unplanned Readmission:        25         Critical Items: None       Problem / Barrier Intervention / Plan  Results   Altered Tolerance and safety for ADL tasks  Training in modified care techniques and use of devices and Back Protection strategies for care tasks     Cognitive impairment  Training in use of compensatory memory strategies, cognitive retraining exercises.      Impaired functional mobility Progress bed mobility, transfers, and ambulation as able with least restrictive device and increasing independence, safe techniques, family training    Decreased activity tolerance Activity pacing, energy conservation, reduced rest breaks with increased activity, use of nustep/UBE                     Total Self Care Score    Total Mobility Score  Admission Score:  TBD      Admission Score:  TBD  Progress:  TBD       Progress:  TBD  Goal:  TBD         Goal:  TBD   `  Discharge Plan   Estimated Discharge Date: 2/24/2020  Overnight or Day Pass: No  Factors facilitating achievement of predicted outcomes: Motivated, Cooperative and Pleasant  Barriers to the achievement of predicted outcomes: Confusion, Decreased endurance and Long standing deficits    Functional Goals at discharge:  Predicted Outcome: Home with family  PATIENT'S LEVEL OF ASSISTANCE: Frequent Supervision  Discharge therapy goals:  PT: Long term goals  Time Frame for Long term goals : by D/C  Long term goal 1: Pt to progress to Mod I bed mobility. Long term goal 2: Pt to perform Mod I transfers with device. Long term goal 3: Pt to amb 150' with device, Mod I.  Long term goal 4: Pt to perform 12 stairs per home set up, 1 HR, SBA with device prn. Long term goal 5: Pt to improve B LE strength by 1/2 MMG. Long term goal 6: Pt to progress to 2MWT with device Mod I at least 100'. Long term goal 7: Pt to improve Tinetti to at least 20/28 to reduce fall risk and improve static/dynamic balance. OT:Long term goals  Time Frame for Long term goals : By discharge  Long term goal 1: Pt will perform BADLs with MI and Good safety. Long term goal 2: Pt will perform functional mobility and transfers during self-care with MI, least restrictive mobility device, and Good safety. Long term goal 3: Pt will stand for 10+ minutes with 0-1 UE support, MI, and no LOB while engaging in functional activity of choice. Long term goal 4: Pt will V/D Good understanding of Fall Prevention strategies for increased safety and IND with self-care and mobility. Long term goal 5: Pt will perform simple meal prep/light housekeeping with MI and Good safety.   ST: Long Term Goals  Time frame for long term goals: By discharge  Long Term goal 1: Mod I for comprehension  Long Term goal 2: Mod I for problem solving   Long Term goal 3: Mod I for memory       Team Members Present at Conference:  :  Roseanne Faustin  LSW  Occupational Therapist: Josephine Wayne OT   Physical [address]  PT  Speech Therapist: Efren Maria Nurse: Yolande Vaca RN   Recreation Therapy: José Lara  Dietary/Nutrition: Dalila Huertas RD LD  Pastoral Care: Yenny Luevano  CMG:  Mihir Puckett RN    I approve the established interdisciplinary plan of care as documented within the medical record of Bhavik Ramirez.  René Meléndez MD

## 2020-02-13 NOTE — PROGRESS NOTES
[address]   ACUTE REHABILITATION  LUNCH GROUP NOTE     Date: 20  Patient Name: Bennett Notice      Room: 6829/4856-22        MRN: [id number]    : 1950  (72 y.o.)  Gender: female   Referring Practitioner: Kingston Ramírez MD  Diagnosis: Neurological disorder    The Patient participated in the OT Program at  in the [address] [place] [address] on this date. They were in group for 30 minutes     The patient  initiated conversation. They expressed  enjoyment in the setting and people. The patient's pain was monitored as they were  able to tolerate Group activity. Pain level was 0/10. They were Setup / SBA  with the Activity.     FIM Score:      [facility]

## 2020-02-13 NOTE — PROGRESS NOTES
currently goes to pain management and has been on Percocet with minimal relief of back pain. Pt admitted to [facility] 2/12/20. Exam: ROM, MMT, bed mobility, transfers, amb, balance  Clinical Presentation: Pt alert, pleasant, agreeable to PT. Barriers to Learning: none  REQUIRES PT FOLLOW UP: Yes  Activity Tolerance  Activity Tolerance: Patient limited by fatigue;Patient limited by pain; Patient limited by endurance  Other Comments  Comments: Chronic R DVT, L lateral rib fx 8-10       Patient Diagnosis(es): There were no encounter diagnoses. has a past medical history of Allergic rhinitis, cause unspecified, Back pain, Bowel obstruction (HCC), C. difficile diarrhea, CAD (coronary artery disease), Cellulitis, Cellulitis, Diverticulosis of colon (without mention of hemorrhage), GERD (gastroesophageal reflux disease), History of MI (myocardial infarction), History of ovarian cyst, History of peritonitis, HTN (hypertension), Hx of blood clots, Hyperlipidemia, Intestinal or peritoneal adhesions with obstruction (postoperative) (postinfection) ([facility] [address] Kidney infection, Lateral epicondylitis  of elbow, Muscle strain, Other abnormal glucose, PONV (postoperative nausea and vomiting), Pre-diabetes, Restless legs syndrome (RLS), TIA (transient ischemic attack), Vitamin D deficiency, and Wears glasses. has a past surgical history that includes Ovary removal (1970); colectomy (8234); Appendectomy (1968); Ovary removal (1971); Hysterectomy (1973); Bunionectomy (Left); sinus surgery (2004); Colonoscopy; other surgical history (8/14/14); cyst removal (Right); Wrist fracture surgery (Left, 3/5/2019); Upper gastrointestinal endoscopy (N/A, 5/31/2019); Upper gastrointestinal endoscopy (N/A, 8/5/2019); Upper gastrointestinal endoscopy (N/A, 8/23/2019); Abdomen surgery (1976);  Cardiac catheterization; Cardiac catheterization (2005); and lumbar fusion (N/A, 2/10/2020). Restrictions  Restrictions/Precautions  Restrictions/Precautions: Fall Risk  Required Braces or Orthoses?: No(Reports brace for B hands- does not wear often)  Implants present? : Metal implants(L4-L5 PLIF 2/10/20, L wrist fx)  Position Activity Restriction  Other position/activity restrictions: up with assistance  Vision/Hearing  Vision: Impaired  Vision Exceptions: Wears glasses for reading  Hearing: Within functional limits     Subjective  General  Chart Reviewed: Yes  Patient assessed for rehabilitation services?: Yes  Additional Pertinent Hx: Alyssa Goodman is 72 y.o.,  female, undergoing preadmission testing for Spondylolisthesis with scheduled Lumbar L4-L5 Posterior Decompression/ Fusion. Pt has lumbar degenerative disc disease. The initial symptoms started 4 years ago. 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 Pain described as constant, aching rating 5/10. Pain is aggravated with standing. Sitting relieves pain. Pt c/o of pain in the lumbar spine area, radiates to the lower limbs worse on the left side. Pt reports pain radiates down left thigh to knee and down right thigh causing tingling. Pt reports difficulty with ambulation due to back pain and \"balance issues\". Uses a cane for ambulation. Has sustained multiple falls with last fall being within last week. No redness, swelling or rashes. Pt has tried injections, physical therapy and NSAIDS. She can no longer take NSAIDS as she developed a bleeding ulcer and required a blood transfusion in August of 2018. Pt currently goes to pain management and has been on Percocet with minimal relief of back pain. Pt admitted to [facility] 2/12/20. Family / Caregiver Present: No  Referring Practitioner: Carmelo Price MD  Referral Date : 02/12/20  Diagnosis: Neurological disorder  Follows Commands: Within Functional Limits  Subjective  Subjective: Pt in bed, agreeable to PT.   Pain Screening  Patient Currently in Pain: Yes  Pain Assessment  Pain Assessment: 0-10  Pain Level: 6  Pain Type: Acute pain;Surgical pain  Pain Location: Back  Pain Orientation: Lower  Pain Descriptors: Aching  Pain Frequency: Intermittent(increases with movement)  Pain Onset: On-going  Clinical Progression: Not changed  Non-Pharmaceutical Pain Intervention(s): Ambulation/Increased Activity; Distraction;Repositioned;Rest;Cold applied  Response to Pain Intervention: Patient Satisfied  Vital Signs  Patient Currently in Pain: Yes  Oxygen Therapy  O2 Device: None (Room air)  Patient Observation  Observations: Lumbar bandage over incision       Orientation  Orientation  Overall Orientation Status: Within Functional Limits  Social/Functional History  Social/Functional History  Lives With: Spouse  Type of Home: House  Home Layout: One level, Laundry in basement(12 steps, L HR - pt's  carries laundry down)  Home Access: Stairs to enter without rails  Entrance Stairs - Number of Steps: 1  Bathroom Shower/Tub: Tub/Shower unit, Doors, Shower chair with back  [employer]: Standard  Bathroom Equipment: Toilet raiser, Grab bars around toilet, Grab bars in shower, Shower chair, Hand-held shower  Bathroom Accessibility: Walker accessible  Home Equipment: Cane, Rolling walker(3WW)  ADL Assistance: Independent  Homemaking Assistance: Independent  Homemaking Responsibilities: Yes  Ambulation Assistance: Independent(with cane)  Transfer Assistance: Independent  Active : Yes  Mode of Transportation: SUV  Occupation: Retired  Type of occupation:   Additional Comments: Pt has hx of multiple falls. Pt's  in good health, able to physically assist. Pt's spouse is retired and able to provide 24/7 assist if needed upon d/c.   Cognition        Objective          AROM RLE (degrees)  RLE AROM: WFL  AROM LLE (degrees)  LLE AROM : WFL  AROM RUE (degrees)  RUE General AROM: See OT  AROM LUE (degrees)  LUE General AROM: See OT  Strength RLE  Strength RLE: WFL  Comment: Grossly 4-/5  Strength LLE  Strength LLE: WFL  Comment: Grossly 4-/5  Strength RUE  Comment: See OT  Strength LUE  Comment: See OT     Sensation  Overall Sensation Status: Impaired(R lateral thigh - numbness)  Bed mobility  Rolling to Left: Stand by assistance  Rolling to Right: Stand by assistance  Supine to Sit: Minimal assistance  Sit to Supine: Moderate assistance  Scooting: Minimal assistance  Comment: HOB slight elevation, assist to place ice packs on pt at end of session in lumbar region. Transfers  Sit to Stand: Moderate Assistance;Minimal Assistance  Stand to sit: Moderate Assistance;Minimal Assistance  Bed to Chair: Minimal assistance  Comment: Cues for RW technique and safe transfer, cues for feet positioning  Ambulation  Ambulation?: Yes  Ambulation 1  Surface: level tile  Device: Rolling Walker  Assistance: Minimal assistance; Moderate assistance  Quality of Gait: decreased step length on R, forward trunk and neck flexion, slow tawanda, no LOB, left lateral lean  Gait Deviations: Slow Tawanda;Decreased step length  Distance: 2MWT: 30', 5' x2 bed to chair  Comments: After ambulation and w/c mobility pt transferring chair to bed and stopped, pt stating she was warm and nauseous, returned to bed and positioned pt - pt feeling better. RN Shanita Poole notified.    Stairs/Curb  Stairs?: No  Wheelchair Activities  Wheelchair Type: Standard  Wheelchair Cushion: Pressure Relieving  Propulsion: Yes  Propulsion 1  Propulsion: Manual  Level: Level Tile  Method: RUE;LUE  Level of Assistance: Minimal assistance  Description/ Details: 90 degree turn, slow speed  Distance: 10'     Balance  Posture: Fair  Sitting - Static: Good;-  Sitting - Dynamic: Fair;+  Standing - Static: Fair;-(with RW)  Standing - Dynamic: Poor;+(with RW)  Comments: High fall risk, 8/28 Tinetti  Other exercises  Other exercises 1: Education on ARU scheduling, goals, safety     Plan   Plan  Times per week: 1.5 hr/day, 5-7 days/week  Specific instructions for Next Treatment: progress gait/standing tolerance, strengthening, balance  Current Treatment Recommendations: Strengthening, Balance Training, Functional Mobility Training, Transfer Training, Endurance Training, Gait Training, Stair training, Home Exercise Program, Safety Education & Training, Patient/Caregiver Education & Training, Equipment Evaluation, Education, & procurement, Positioning  Safety Devices  Type of devices: Gait belt, Call light within reach, Nurse notified, All fall risk precautions in place, Left in bed, Patient at risk for falls(RN Dimitrios Dhaliwal notified of pt not feeling well)    G-Code       OutComes Score  Balance Score: 2 (02/13/20 0910)  Gait Score: 6 (02/13/20 0910)        Tinetti Total Score: 8 (02/13/20 0910)   2MWT: 30' with RW min x1  Goals  Short term goals  Time Frame for Short term goals: 1 week  Short term goal 1: Pt to demonstrate CGA/SBA bed mobility wtih good log rolling technique. Short term goal 2: Pt to demonstrate min/CGA x1 transfers with good technique. Short term goal 3: Pt to amb 76' with RW CGA. Short term goal 4: Pt to 3-5 steps, min x1 with 1-2 HR. Short term goal 5: Pt to improve standing tolerance to at least 4-5 minutes with 1 UE support, CGA/SBA. Short term goal 6: Pt to improve sitting balance (Static/dynamic) to Good with good posture. Long term goals  Time Frame for Long term goals : by D/C  Long term goal 1: Pt to progress to Mod I bed mobility. Long term goal 2: Pt to perform Mod I transfers with device. Long term goal 3: Pt to amb 150' with device, Mod I.  Long term goal 4: Pt to perform 12 stairs per home set up, 1 HR, SBA with device prn. Long term goal 5: Pt to improve B LE strength by 1/2 MMG. Long term goal 6: Pt to progress to 2MWT with device Mod I at least 100'. Long term goal 7: Pt to improve Tinetti to at least 20/28 to reduce fall risk and improve static/dynamic balance.   Patient Goals   Patient goals : \"I want to be able to get in and out of bed on my own\"       Therapy Time   Individual Concurrent Group Co-treatment   Time In 0910         Time Out 1000         Minutes 50         Timed Code Treatment Minutes: [address] Nikki Farr, PT

## 2020-02-13 NOTE — PROGRESS NOTES
Fusion. Pt has lumbar degenerative disc disease. The initial symptoms started 4 years ago. 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 Pain described as constant, aching rating 5/10. Pain is aggravated with standing. Sitting relieves pain. Pt c/o of pain in the lumbar spine area, radiates to the lower limbs worse on the left side. Pt reports pain radiates down left thigh to knee and down right thigh causing tingling. Pt reports difficulty with ambulation due to back pain and \"balance issues\". Uses a cane for ambulation. Has sustained multiple falls with last fall being within last week. No redness, swelling or rashes. Pt has tried injections, physical therapy and NSAIDS. She can no longer take NSAIDS as she developed a bleeding ulcer and required a blood transfusion in August of 2018. Pt currently goes to pain management and has been on Percocet with minimal relief of back pain. Pt admitted to [facility] 2/12/20. Restrictions/Precautions  Restrictions/Precautions: Fall Risk;Surgical Protocols  Required Braces or Orthoses?: No(Reports brace for B hands- does not wear often)  Implants present? : Metal implants(L4-L5 PLIF 2/10/20, L wrist fx)  Position Activity Restriction  Other position/activity restrictions: up with assistance    Subjective: Pt in chair, agreeable to PT at this time. Comments: Pt appeared drowsy (reports, eyes half-closed, inattentive at times) during tx. RNs Eliazar Robles present on exit to start IV.     Vital Signs  Patient Currently in Pain: Yes  Pain Assessment: Faces  Stanley-Baker Pain Rating: Hurts whole lot  Pain Type: Acute pain  Pain Location: Back  Pain Orientation: Lower  Pain Descriptors: Sore;Discomfort  Pain Frequency: Continuous  Clinical Progression: Not changed  Non-Pharmaceutical Pain Intervention(s): Repositioned;Rest;Ambulation/Increased Activity  Response to Pain Intervention: None    Patient Observation  Observations: Lumbar bandage over incision    Bed Mobility  Bridging: Training, Transfer Training, Endurance Training, Gait Training, Stair training, Home Exercise Program, Safety Education & Training, Patient/Caregiver Education & Training, Equipment Evaluation, Education, & procurement, Positioning    Conditions Requiring Skilled Therapeutic Intervention  Body structures, Functions, Activity limitations: Decreased functional mobility ; Decreased strength;Decreased safe awareness;Decreased cognition;Decreased balance; Increased pain;Decreased posture;Decreased ADL status; Decreased endurance  Discharge Recommendations: Home with assist PRN;Patient would benefit from continued therapy after discharge    Goals  Short term goals  Time Frame for Short term goals: 1 week  Short term goal 1: Pt to demonstrate CGA/SBA bed mobility wtih good log rolling technique. Short term goal 2: Pt to demonstrate min/CGA x1 transfers with good technique. Short term goal 3: Pt to amb 76' with RW CGA. Short term goal 4: Pt to 3-5 steps, min x1 with 1-2 HR. Short term goal 5: Pt to improve standing tolerance to at least 4-5 minutes with 1 UE support, CGA/SBA. Short term goal 6: Pt to improve sitting balance (Static/dynamic) to Good with good posture. Long term goals  Time Frame for Long term goals : by D/C  Long term goal 1: Pt to progress to Mod I bed mobility. Long term goal 2: Pt to perform Mod I transfers with device. Long term goal 3: Pt to amb 150' with device, Mod I.  Long term goal 4: Pt to perform 12 stairs per home set up, 1 HR, SBA with device prn. Long term goal 5: Pt to improve B LE strength by 1/2 MMG. Long term goal 6: Pt to progress to 2MWT with device Mod I at least 100'. Long term goal 7: Pt to improve Tinetti to at least 20/28 to reduce fall risk and improve static/dynamic balance.      02/13/20 1401   PT Individual Minutes   Time In 1350   Time Out 1434   Minutes 44     Electronically signed by Lashell Martinez PTA on 2/13/20 at 3:51 PM

## 2020-02-13 NOTE — PROGRESS NOTES
due to malabsorption; Closed head injury; Scalp laceration; Abnormal finding on imaging; Other irritable bowel syndrome; Frequent falls; Double vision; Muscle soreness; GI bleed; Peptic ulcer; Melena; PUD (peptic ulcer disease); Absolute anemia; Acute blood loss anemia; Acute renal failure ([address] Clostridium difficile infection; Acquired spondylolisthesis; Spinal stenosis of lumbar region with neurogenic claudication; Acute deep vein thrombosis (DVT) of proximal vein of left lower extremity ([address] Leg swelling; Back pain; and Neurological disorder on their problem list.  DATE ONSET:      Date of Eval: 2/13/2020   Evaluating Therapist: LIANG Knapp     Most Recent MRI 3/15/19  Impression   No acute intracranial abnormality.       Minimal parenchymal volume loss.       Minimal chronic microvascular disease.       Sinus mucosal disease and minimal right mastoid effusion. Primary Complaint:   Per chart:  History of current illness: [age] y.o.,  female, undergoing preadmission testing for Spondylolisthesis with scheduled Lumbar L4-L5 Posterior Decompression/ Fusion. Pt has lumbar degenerative disc disease. The initial symptoms started 4 years ago. 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 Pain described as constant, aching rating 5/10. Pain is aggravated with standing. Sitting relieves pain. Pt c/o of pain in the lumbar spine area, radiates to the lower limbs worse on the left side. Pt reports pain radiates down left thigh to knee and down right thigh causing tingling. Pt reports difficulty with ambulation due to back pain and \"balance issues\". Uses a cane for ambulation. Has sustained multiple falls with last fall being within last week. No redness, swelling or rashes. Pt has tried injections, physical therapy and NSAIDS. She can no longer take NSAIDS as she developed a bleeding ulcer and required a blood transfusion in August of 2018.  Pt currently goes to pain management and

## 2020-02-13 NOTE — PROGRESS NOTES
[facility]   Acute Rehabilitation OT Evaluation  Date: 20  Patient Name: Allie Ca       Room: 5537/6352-75  MRN: [id number]  Account: [de-identified]   : 1950  (72 y.o.) Gender: female     Referring Practitioner: Dr. Rae/Dr. Morton [place]  Diagnosis: Neurological disorder; Lumbar L4-5 PLIF Vertebroplasty L4 & L5 2/10/20       Treatment Diagnosis: Impaired self-care status. Past Medical History:  has a past medical history of Allergic rhinitis, cause unspecified, Back pain, Bowel obstruction (HCC), C. difficile diarrhea, CAD (coronary artery disease), Cellulitis, Cellulitis, Diverticulosis of colon (without mention of hemorrhage), GERD (gastroesophageal reflux disease), History of MI (myocardial infarction), History of ovarian cyst, History of peritonitis, HTN (hypertension), Hx of blood clots, Hyperlipidemia, Intestinal or peritoneal adhesions with obstruction (postoperative) (postinfection) ([address] Kidney infection, Lateral epicondylitis  of elbow, Muscle strain, Other abnormal glucose, PONV (postoperative nausea and vomiting), Pre-diabetes, Restless legs syndrome (RLS), TIA (transient ischemic attack), Vitamin D deficiency, and Wears glasses. Past Surgical History:   has a past surgical history that includes Ovary removal (); colectomy (); Appendectomy (); Ovary removal (); Hysterectomy (); Bunionectomy (Left); sinus surgery (); Colonoscopy; other surgical history (14); cyst removal (Right); Wrist fracture surgery (Left, 3/5/2019); Upper gastrointestinal endoscopy (N/A, 2019); Upper gastrointestinal endoscopy (N/A, 2019); Upper gastrointestinal endoscopy (N/A, 2019); Abdomen surgery (); Cardiac catheterization; Cardiac catheterization (); and lumbar fusion (N/A, 2/10/2020).     Restrictions  Restrictions/Precautions: Fall Risk  Implants present? : Metal implants(L4-L5 PLIF 2/10/20, L wrist fx)  Other position/activity restrictions: up with assistance  Required Braces or Orthoses?: No(Reports brace for B hands- does not wear often)    Vitals  Temp: 97.7 °F (36.5 °C)  Pulse: 73  Resp: 20  BP: (!) 133/47  Height: 5' 3\" (160 cm)  Weight: 168 lb 3.2 oz (76.3 kg)  BMI (Calculated): 29.9  Oxygen Therapy  SpO2: 98 %  O2 Device: None (Room air)  Level of Consciousness: Alert    Subjective  Subjective: \"I had tingling in my left shoulder, but I haven't had it since I had the surgery\"  Comments: Per RN Teressa Joaquin, Pt has a chronic DVT in RLE. Pt is OK to begin therapy this date. Per RN, Dr. Eun Bonds stated that Pt's anti-coagulation will be restarted on 2/20/20. In PM, Pt observed ambulating back from bathroom per self. Pt stated \"I had to go, it was urgent. \" OT provided assist to return Pt to bed with bed alarm on.  MEENU Craig notified of Pt ambulating to bathroom without assist.  Pain Level: 6  Pain Location: Back  Pain Orientation: Lower  Orientation  Overall Orientation Status: Within Functional Limits  Vision  Vision: Impaired  Vision Exceptions: Wears glasses for reading  Hearing  Hearing: Within functional limits  Vision - Basic Assessment  Prior Vision: Wears glasses only for reading  Social/Functional History  Lives With: Spouse  Type of Home: House  Home Layout: One level, Laundry in basement(12 steps, L HR - pt's  carries laundry down)  Home Access: Stairs to enter without rails  Entrance Stairs - Number of Steps: 1  Bathroom Shower/Tub: Tub/Shower unit, Doors, Shower chair with back  [employer]: Standard  Bathroom Equipment: Toilet raiser, Grab bars around toilet, Grab bars in shower, Shower chair, Hand-held shower  Bathroom Accessibility: Walker accessible  Home Equipment: Cane, Rolling walker(3WW)  ADL Assistance: Independent  Homemaking Assistance: Independent  Homemaking Responsibilities: Yes  Ambulation Assistance: Independent(with cane)  Transfer Assistance: Independent  Active : Yes  Mode of Transportation: [facility]  Occupation: Retired  Type of occupation:   Additional Comments: Pt has hx of multiple falls. Pt's  in good health, able to physically assist. Pt's spouse is retired and able to provide 24/7 assist if needed upon d/c. Pain Assessment  Pain Assessment: 0-10  Pain Level: 6  Patient's Stated Pain Goal: No pain  Pain Type: Acute pain, Surgical pain  Pain Location: Back  Pain Orientation: Lower  Pain Descriptors: Tender, Sore  Pain Frequency: Continuous  Clinical Progression: Not changed  Response to Pain Intervention: Patient Satisfied    Objective  Vision - Basic Assessment  Prior Vision: Wears glasses only for reading   Cognition  Overall Cognitive Status: Impaired  Arousal/Alertness: Delayed responses to stimuli  Following Directions: Follows two step commands  Attention Span: Attends with cues to redirect  Memory: Decreased short term memory, Decreased recall of recent events  Following Commands:  Follows multistep commands with repetition  Safety Judgement: Decreased awareness of need for safety  Awareness of Errors: Decreased awareness of errors  Insights: Decreased awareness of deficits  Sequencing and Organization: Assistance required to implement solutions, Assistance required to generate solutions, Assistance required to identify errors made   Perception  Overall Perceptual Status: Impaired  Unilateral Attention: Cues to maintain midline in sitting, Cues to maintain midline in standing  Motor Planning: Cues to use objects appropriately  Sensation  Overall Sensation Status: Impaired(R lateral thigh - numbness)     UE Function           LUE Strength  Gross LUE Strength: WFL  L Hand General: 4-/5  LUE Strength Comment: Shoulder 4-/5, elbow 4-/5     LUE Tone: Normotonic     LUE AROM (degrees)  LUE AROM : WFL     Left Hand AROM (degrees)  Left Hand AROM: WFL  RUE Strength  Gross RUE Strength: WFL  R Hand General: 4/5  RUE Strength Comment: Shoulder 4/5, elbow 4/5      RUE Tone: Normotonic     RUE AROM standing at sink; VCs for thoroughness & safety)  UE Dressing: Minimal assistance;Setup;Verbal cueing; Increased time to complete(A to hook bra)  LE Dressing: Moderate assistance;Setup;Verbal cueing; Increased time to complete(A to thread RLE into pants, don B TEDs & adjust B shoes)  Toileting: Contact guard assistance;Setup; Increased time to complete(CGA while standing)  Additional Comments: Pt engaged in bathing, dressing, grooming and toileting tasks this date. Pt declined shower and requested to bathe at sink while seated in w/c. Min VCs for thoroughness and safety. IRF-MARILIN Items   Eating   5  Setup or clean-up assistance     Oral Hygiene   4  Assistance Needed: Supervision or touching assistance     Shower/Bathe Self   4  Assistance Needed: Supervision or touching assistance     Upper Body Dressing   3  Assistance Needed: Partial/moderate assistance     Lower Body Dressing   3  Assistance Needed: Partial/moderate assistance     Putting On/Taking Off Footwear   1  Assistance Needed: Dependent     Toilet Transfer   3  Assistance Needed: [address] assistance     [address]   4  Assistance Needed: Supervision or touching assistance            Goals  Patient Goals   Patient goals : \"To be able to do all the things I need to do\"  Short term goals  Time Frame for Short term goals: One week  Short term goal 1: Pt will V/D Good understanding of AE/DME/modified techniques for increased IND with self-care and mobility. Short term goal 2: Pt will perform UB ADLs with SUP and LB ADLs with SBA and Good safety. Short term goal 3: Pt will actively participate in 30+ minutes of therapeutic exercise/functional activities to promote increased IND with self-care and mobility. Short term goal 4: Pt will stand for 5+ minutes with 0-1 UE support, SBA, and no LOB while engaging in functional activity of choice.   Short term goal 5: Pt will perform functional mobility and transfers during self-care consistently with SBA, least restrictive mobility device, and Good safety. Long term goals  Time Frame for Long term goals : By discharge  Long term goal 1: Pt will perform BADLs with MI and Good safety. Long term goal 2: Pt will perform functional mobility and transfers during self-care with MI, least restrictive mobility device, and Good safety. Long term goal 3: Pt will stand for 10+ minutes with 0-1 UE support, MI, and no LOB while engaging in functional activity of choice. Long term goal 4: Pt will V/D Good understanding of Fall Prevention strategies for increased safety and IND with self-care and mobility. Long term goal 5: Pt will perform simple meal prep/light housekeeping with MI and Good safety. Assessment  Performance deficits / Impairments: Decreased functional mobility , Decreased ADL status, Decreased strength, Decreased safe awareness, Decreased endurance, Decreased cognition, Decreased balance, Decreased high-level IADLs, Decreased fine motor control, Decreased coordination, Decreased posture, Decreased sensation  Treatment Diagnosis: Impaired self-care status.   Prognosis: Good  Decision Making: Medium Complexity  REQUIRES OT FOLLOW UP: Yes  Discharge Recommendations: Patient would benefit from continued therapy after discharge  Plan  Times per week: 5-7  Times per day: Twice a day  Current Treatment Recommendations: Self-Care / ADL, Home Management Training, Strengthening, Balance Training, Functional Mobility Training, Endurance Training, Cognitive Reorientation, Pain Management, Safety Education & Training, Patient/Caregiver Education & Training, Equipment Evaluation, Education, & procurement, Cognitive/Perceptual Training          02/13/20 1032   OT Individual Minutes   Time In [phone]   Time Out 7294   [id number]   Time Code Minutes    Timed Code Treatment Minutes 46 Minutes     Electronically signed by Clint Oswald OT on 2/13/20 at 12:46 PM

## 2020-02-13 NOTE — CONSULTS
[facility]    CONSULTATION / HISTORY AND PHYSICAL EXAMINATION            Date:   2/13/2020  Patient name:  Pankaj Martin  Date of admission:  2/12/2020  5:58 PM  MRN:   [id number]  Account:  [de-identified]  YOB: 1950  PCP:    Hao Cazares MD  Room:   2606/2606-01  Code Status:    Full Code    Physician Requesting Consult: Ander Hickey MD    Reason for Consult: medical Management     Chief Complaint:     No chief complaint on file. Back pain    History Obtained From:     patient, electronic medical record    History of Present Illness:   Patient admitted to [facility] acute rehab for rehabilitation. She recently had surgery on her back. Patient has history of chronic back pain with symptoms of neurological claudication. She had spinal stenosis, she underwent L4-L5 decompressive surgery. Patient has history of hypertension, hyperlipidemia, diabetes chronic DVT of right leg  Patient had anemia, started on IV Venofer.   Past Medical History:     Past Medical History:   Diagnosis Date    Allergic rhinitis, cause unspecified     Back pain     lumbar    Bowel obstruction (HCC)     history of due to scar tissue, resolved non-surgically    C. difficile diarrhea     CAD (coronary artery disease)     Cellulitis     left leg    Cellulitis 2017 August    leg left leg/bug bite    Diverticulosis of colon (without mention of hemorrhage)     GERD (gastroesophageal reflux disease)     History of MI (myocardial infarction) 2005    thought due to a blood clot    History of ovarian cyst 1970    had oopherectomy    History of peritonitis 1968    due to ruptured appendix age 13    HTN (hypertension)     Hx of blood clots     right leg    Hyperlipidemia     Intestinal or peritoneal adhesions with obstruction (postoperative) ([address]infection) [address]     Kidney infection     renal failure/sepsis/spider bite    Lateral epicondylitis  of elbow    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, 1.5 pounds    APPENDECTOMY  1968    appendix ruptured, developed peritonitis    BUNIONECTOMY Left     along with calcium deposits removed   [address]  2005    negative    COLECTOMY  1969    12 INCHES REMOVED D/T OBSTRUCTION    COLONOSCOPY      CYST REMOVAL Right     right facial    HYSTERECTOMY  1973    taken as a result of recurring cysts    LUMBAR FUSION N/A 2/10/2020    LUMBAR L4-5 POSTERIOR  DECOMPRESSION INSTRUMENTATION FUSION WCEMENT AUGMENTATION/ [facility]  8[facility]14/14    FESS    OVARY REMOVAL  1970    UNILATERAL due to cyst    OVARY REMOVAL  1971    partial, due to cyst    SINUS SURGERY  2004    UPPER GASTROINTESTINAL ENDOSCOPY N/A 5/31/2019    EGD ESOPHAGOGASTRODUODENOSCOPY performed by Bunny Sanchez MD at [address] [place] [address] 8/5/2019    EGD BIOPSY performed by Santos Frye MD at [address] [place] [address] N/A 8/23/2019    EGD BIOPSY performed by Bunny Sanchez MD at [address] Left 3/5/2019    WRIST OPEN REDUCTION INTERNAL FIXATION performed by Bela Aguiar MD at [address]        Medications Prior to Admission:     Prior to Admission medications    Medication Sig Start Date End Date Taking? Authorizing Provider   oxyCODONE-acetaminophen (PERCOCET) 5-325 MG per tablet Take 1 tablet by mouth every 6 hours as needed for Pain for up to 7 days. Intended supply: 7 days.  Take lowest dose possible to manage pain 2/12/20 2/19/20 Yes Charan Blood MD   furosemide (LASIX) 40 MG tablet Take 1 tablet by mouth daily 1/20/20  Yes Ira Gusman MD   ferrous sulfate 325 (65 Fe) MG tablet Take 1 tablet by mouth daily (with breakfast) 1/3/20  Yes Donnie Horne MD   VITAMIN D, ERGOCALCIFEROL, PO Take by mouth   Yes Historical Provider, MD   hydrALAZINE (APRESOLINE) 50 MG tablet Take 1 tablet by mouth 3 times daily 1/2/20  Yes Ira Gusman MD   citalopram (CELEXA) 10 MG tablet Take 1 tablet by mouth daily 1/2/20  Yes Ira Gusman MD   fenofibrate micronized (LOFIBRA) 134 MG capsule Take 1 capsule by mouth every morning (before breakfast) 11/26/19  Yes Ira Gusman MD   lisinopril (PRINIVIL;ZESTRIL) 10 MG tablet Take 1 tablet by mouth daily 11/26/19  Yes Ira Gusman MD   pramipexole (MIRAPEX) 1 MG tablet Take 1.5 tablets by mouth daily 11/26/19  Yes Ira Gusman MD   atorvastatin (LIPITOR) 80 MG tablet Take 0.5 tablets by mouth nightly 11/11/19  Yes Dali Huertas MD   pantoprazole (PROTONIX) 40 MG tablet Take 1 tablet by mouth 2 times daily (before meals) 11/11/19  Yes Dali Huertas MD   SITagliptin (JANUVIA) 100 MG tablet Take 0.5 tablets by mouth daily 10/10/19  Yes Altsanthosh Schwartz MD   carvedilol (COREG) 12.5 MG tablet Take 1 tablet by mouth 2 times daily 8/13/19  Yes MAIK Bray - CNP   Calcium Carbonate-Vitamin D (CALCIUM 500/D) 500-125 MG-UNIT TABS Take 1 tablet by mouth 2 times daily    Yes Historical Provider, MD   Lancets MISC 1 each by Does not apply route daily 10/10/19   Teresa Jauregui MD   blood glucose monitor strips Test 2 times a day & as needed for symptoms of irregular blood glucose. 10/10/19   Teresa Jauregui MD   ondansetron (ZOFRAN) 4 MG tablet Take 1 tablet by mouth daily as needed for Nausea or Vomiting 8/19/19   Teresa Jauregui MD   lidocaine (XYLOCAINE) 5 % ointment 4-5 times a day to your right thigh for pain.  3/15/19   Donnell Davison MD   nitroGLYCERIN (NITROSTAT)

## 2020-02-13 NOTE — H&P
Physical Medicine & Rehabilitation History and Physical  [facility] Acute Rehabilitation Unit     Primary care provider: Laura Uriostegui MD     Chief Complaint and Reason for Rehabilitation Admission:   Ambulatory and ADL dysfunction secondary spondylolisthesis with lumbar spinal stenosis status post L4-5 posterior decompression with fusion as well as vertebroplasty L4 and L5     History of Present Illness:  Bennett Meier  is a 72 y.o. right-handed     female admitted to the [address] [place] [address] on 2/12/2020.         60-year-old  female with history of spondylolisthesis and lumbar degenerative disc disease. Symptoms began 4 years ago. Noted pain 5/10 aggravated by standing. Pain radiates to the lower extremities left greater than right. .  She had difficulty ambulating and balance issues. She used a cane for ambulation and has had multiple falls. She has tried injections, physical therapy and anti-inflammatories. Stop anti-inflammatories due to bleeding ulcer which required a transfusion in August 2018. She was seeing pain management has been placed on Percocet. She was admitted on 2/10/2020 and underwent L4-5 posterior decompression, posterior instrumentation and fusion and application of intervertebral body fusion cage as well as vertebroplasty of L4 and L5. By Dr. Bunch January. Patient follow-up by internal medicine for medical management-noted to have blood loss anemia due to iron deficiency-started on IV Venofer. Noted history of DVT has been off anticoagulation for months.-Note of chronic DVT right lower extremity-to begin DVT prophylaxis when okay with surgeon. Venous Dopplers lower extremities 2/12/2020    Chronic deep vein thrombosis of the right leg involving the gastrocnemius    vein. She  is currently requiring assistance for self-care activities and mobility prompting this admission.     Premorbid function:  Ambulate with assistance  Quality of Gait: decreased step length on R, forward trunk and neck flexion, slow tawanda, no LOB  Gait Deviations: Slow Tawanda, Decreased step length  Distance: 40'   Comments: VCs for increased step length R/step-through pattern; posture. OT:   ADL  ADL  Feeding: Setup; Increased time to complete  Grooming: Stand by assistance;Setup; Increased time to complete(while seated at sink in w/c)  UE Bathing: Stand by assistance;Setup;Verbal cueing; Increased time to complete(VCs for thoroughness)  LE Bathing: Contact guard assistance;Setup;Verbal cueing; Increased time to complete(CGA while standing at sink; VCs for thoroughness & safety)  UE Dressing: Minimal assistance;Setup;Verbal cueing; Increased time to complete(A to hook bra)  LE Dressing: Moderate assistance;Setup;Verbal cueing; Increased time to complete(A to thread RLE into pants, don B TEDs & adjust B shoes)  Toileting: Contact guard assistance;Setup; Increased time to complete(CGA while standing)  Additional Comments: Pt engaged in bathing, dressing, grooming and toileting tasks this date. Pt declined shower and requested to bathe at sink while seated in w/c. Min VCs for thoroughness and safety. Balance  Sitting Balance: Supervision(4 to 5 min static )  Bed mobility  Rolling to Left: Stand by assistance  Scooting: Minimal assistance  Transfers  Stand Pivot Transfers: Minimal assistance  Sit to stand: Minimal assistance  Stand to sit: Minimal assistance       ST:   Cognitive Diagnosis: Cognitive evaluation revealed deficits in the areas of immediate/short term memory, working memory, divergent thinking, abstract (deductive) reasoning and numeric reasoning  Aphasia Diagnosis: No aphasia noted, mild generative naming deficits  Speech Diagnosis: clear and fluent  Communication Diagnosis: functional   Diagnosis: Pt presents with cognitive impairmented marked by memory impairment and abstract reasoning deficits.  Therapy is recommended to bring skills to more functional level.        Past Medical History:      Diagnosis Date    Allergic rhinitis, cause unspecified     Back pain     lumbar    Bowel obstruction (HCC)     history of due to scar tissue, resolved non-surgically    C. difficile diarrhea     CAD (coronary artery disease)     Cellulitis     left leg    Cellulitis 2017 August    leg left leg/bug bite    Diverticulosis of colon (without mention of hemorrhage)     GERD (gastroesophageal reflux disease)     History of MI (myocardial infarction) 2005    thought due to a blood clot    History of ovarian cyst 1970    had oopherectomy    History of peritonitis 1968    due to ruptured appendix age 13    HTN (hypertension)     Hx of blood clots     right leg    Hyperlipidemia     Intestinal or peritoneal adhesions with obstruction (postoperative) (postinfection) ([facility] [address]     Kidney infection     renal failure/sepsis/spider bite    Lateral epicondylitis  of elbow     Muscle strain     right posterior shoulder    Other abnormal glucose     PONV (postoperative nausea and vomiting)     dry heaves    Pre-diabetes     Restless legs syndrome (RLS)     TIA (transient ischemic attack) 2014    Vitamin D deficiency     Wears glasses        Past Surgical History:      Procedure Laterality Date    ABDOMEN SURGERY  1976    benign tumor removed near remaining ovary, 1.5 pounds    APPENDECTOMY  1968    appendix ruptured, developed peritonitis    BUNIONECTOMY Left     along with calcium deposits removed   R [address]  2005    negative    COLECTOMY  1969    12 INCHES REMOVED D/T OBSTRUCTION    COLONOSCOPY      CYST REMOVAL Right     right facial    HYSTERECTOMY  1973    taken as a result of recurring cysts    LUMBAR FUSION N/A 2/10/2020    LUMBAR L4-5 POSTERIOR  DECOMPRESSION INSTRUMENTATION FUSION WCEMENT AUGMENTATION/ performed by Deja Connell MD at [address] [place] [address]  8/14/14 BUN 24*   CREATININE 0.65     BNP: No results for input(s): BNP in the last 72 hours. PT/INR: No results for input(s): PROTIME, INR in the last 72 hours. APTT: No results for input(s): APTT in the last 72 hours. CARDIAC ENZYMES: No results for input(s): CKMB, CKMBINDEX, TROPONINT in the last 72 hours. Invalid input(s): CKTOTAL;3  FASTING LIPID PANEL:  Lab Results   Component Value Date    CHOL 103 05/20/2019    HDL 48 05/20/2019    TRIG 66 05/20/2019     LIVER PROFILE: No results for input(s): AST, ALT, ALB, BILIDIR, BILITOT, ALKPHOS in the last 72 hours. I/O (24Hr): No intake or output data in the 24 hours ending 02/13/20 1617    Glu last 24 hour  Recent Labs     02/12/20  1656 02/13/20  0621 02/13/20  1031 02/13/20  1548   POCGLU 128* 134* 93 96       No results for input(s): CLARITYU, COLORU, PHUR, SPECGRAV, PROTEINU, RBCUA, BLOODU, BACTERIA, NITRU, WBCUA, LEUKOCYTESUR, YEAST, GLUCOSEU, BILIRUBINUR in the last 72 hours. Social History:  Dwelling House - 1 story. Steps to enter:  2,  Bed/bathroom level:  1. Lives with:   Tobacco: NONE  Alcohol usage:  none  Illicit: denies    Family History:       Problem Relation Age of Onset    Stroke Mother     Diabetes Mother     Heart Disease Mother     High Blood Pressure Mother     Heart Disease Father     Heart Disease Brother     High Blood Pressure Brother     Heart Disease Maternal Grandmother     High Blood Pressure Sister        Review of Systems:  CONSTITUTIONAL:  Denies fevers, chills, sweats or fatigue. EYES:  Denies diplopia, blind spots, blurring. HEENT:  Denies hearing loss, trouble chewing or swallowing. RESPIRATORY:  No wheezing, coughing, shortness of breath. CARDIOVASCULAR:  Denies chest pain, palpitations, lightheadedness. GASTROINTESTINAL:  Denies heartburn, nausea, constipation, diarrhea, abdominal pain. GENITOURINARY:  No urgency, frequency, incontinence, dysuria.   ENDOCRINE:  Denies hot or cold ambulation, ADLs, steps, family training, cognition, etc.  She will need close medical monitoring due to DVT, pain, incision, anemia, etc.  Home goal in approximately 10 to 14 days at supervision to contact-guard level with . Prognosis good  2. Status post L4-5 PLIF and vertebroplasty L4 and L5.-Monitor incision,  3. Pain- Roxicodone-attempt to taper, lidocaine  4. Hypertension/cardiac-Coreg, Lasix, hydralazine, lisinopril, PRN nitroglycerin, Lipitor for hyperlipidemia  5. Depression-Celexa  6. Diabetes-Tradjenta, monitor glucose  7. GERD, history of GI bleed- Protonix  8. GI-MiraLAX, senna S, Dulcolax, Zofran,-noted history of bowel obstruction  9. History of DVT- chronic right gastroc DVT- resume anticoagulation when okay with general surgery, repeat venous Dopplers 2/19, notes DVT 3 years ago was on Eliquis for 6 months. Has been off of it since then.-Consider resuming DVT prophylaxis once cleared  10. Internal medicine for medical management    DVT Prophylaxis:  SCD's while in bed, PRAVEEN's while in bed and History of chronic DVT right lower extremity and gastroc-resume anticoagulation when okay with Dr. Eun Bonds    Estimated Length of Stay:  2 weeks. Prognosis  good    Goals    Home at contact guard   24 hour      Marcus Vee MD       This note is created with the assistance of a speech recognition program.  While intending to generate a document that actually reflects the content of the visit, the document can still have some errors including those of syntax and sound a like substitutions which may escape proof reading.   In such instances, actual meaning can be extrapolated by contextual diversion

## 2020-02-13 NOTE — PROGRESS NOTES
Cherelle Meier RN   Registered Nurse   Case Management   Progress Notes   Signed   Date of Service:  2020  3:36 PM          Related encounter: Admission (Discharged) [facility]  [x]Manual[x]Template[x]Copied    Added by:  [x]Vanessa Ferrara, MEENU    []Cielo for details  [facility]: SAROJ COUCH W  Acute Inpatient Rehab Preadmission Assessment     Patient Name: Anuja Bojorquez        MRN:   [id number]    : 1950  (76 y.o.)  Gender: female      Admitted from:   [x]? [facility]  []? [address]   []? [facility] [address]   []? Outside Admission - Location:                                 [x]? Initial         []? Updated     Date of Onset / admission to the acute hospital:  2/10/20     Impairment group:  3.9 Neurologic Conditions     Did patient have surgery? []? No  [x]? Yes:  PROCEDURE PERFORMED:  L4-5 posterior decompression, posterior  instrumented fusion, posterior interbody fusion with application of  intervertebral body fusion cage, vertebroplasty L4, vertebroplasty L5,  autograft, crushed cortical cancellous allograft, fluoroscopic  assistance.       Physicians: Yunior Cherry     Risk for clinical complications:   Moderate     Co-morbidities:     Allergic rhinitis, cause unspecified      Back pain       lumbar    Bowel obstruction (HCC)       history of due to scar tissue, resolved non-surgically    C. difficile diarrhea      CAD (coronary artery disease)      Cellulitis       left leg    Cellulitis 2017     leg left leg/bug bite    Diverticulosis of colon (without mention of hemorrhage)      GERD (gastroesophageal reflux disease)      History of MI (myocardial infarction)      thought due to a blood clot    History of ovarian cyst      had oopherectomy    History of peritonitis      due to ruptured appendix age 13    HTN (hypertension)      Hx of blood clots       right leg    Hyperlipidemia      Intestinal or peritoneal adhesions care needs:  n/a     Acute Inpatient Rehabilitation Disclosure Statement provided to patient. Patient verbalized understanding.   Copy placed on patient's light chart.     I have reviewed and concur with the findings and results of the pre-admission screening assessment completed by the Inpatient Rehabilitation Admissions Coordinator.                  Cosigned by: Leslee Douglass MD at 2/12/2020  4:15 PM   Revision History

## 2020-02-14 LAB
GLUCOSE BLD-MCNC: 105 MG/DL (ref 65–105)
GLUCOSE BLD-MCNC: 106 MG/DL (ref 65–105)
GLUCOSE BLD-MCNC: 113 MG/DL (ref 65–105)
GLUCOSE BLD-MCNC: 87 MG/DL (ref 65–105)

## 2020-02-14 PROCEDURE — 6370000000 HC RX 637 (ALT 250 FOR IP): Performed by: PHYSICAL MEDICINE & REHABILITATION

## 2020-02-14 PROCEDURE — 97116 GAIT TRAINING THERAPY: CPT

## 2020-02-14 PROCEDURE — 97530 THERAPEUTIC ACTIVITIES: CPT

## 2020-02-14 PROCEDURE — 6360000002 HC RX W HCPCS: Performed by: INTERNAL MEDICINE

## 2020-02-14 PROCEDURE — 2580000003 HC RX 258: Performed by: INTERNAL MEDICINE

## 2020-02-14 PROCEDURE — 82947 ASSAY GLUCOSE BLOOD QUANT: CPT

## 2020-02-14 PROCEDURE — 97535 SELF CARE MNGMENT TRAINING: CPT

## 2020-02-14 PROCEDURE — 99232 SBSQ HOSP IP/OBS MODERATE 35: CPT | Performed by: INTERNAL MEDICINE

## 2020-02-14 PROCEDURE — 97130 THER IVNTJ EA ADDL 15 MIN: CPT

## 2020-02-14 PROCEDURE — 97129 THER IVNTJ 1ST 15 MIN: CPT

## 2020-02-14 PROCEDURE — 1180000000 HC REHAB R&B

## 2020-02-14 PROCEDURE — 6370000000 HC RX 637 (ALT 250 FOR IP): Performed by: INTERNAL MEDICINE

## 2020-02-14 PROCEDURE — 99232 SBSQ HOSP IP/OBS MODERATE 35: CPT | Performed by: PHYSICAL MEDICINE & REHABILITATION

## 2020-02-14 PROCEDURE — 97150 GROUP THERAPEUTIC PROCEDURES: CPT

## 2020-02-14 PROCEDURE — 97110 THERAPEUTIC EXERCISES: CPT

## 2020-02-14 RX ADMIN — ACETAMINOPHEN 650 MG: 325 TABLET, FILM COATED ORAL at 05:37

## 2020-02-14 RX ADMIN — FENOFIBRATE 160 MG: 160 TABLET ORAL at 05:36

## 2020-02-14 RX ADMIN — HYDRALAZINE HYDROCHLORIDE 50 MG: 50 TABLET, FILM COATED ORAL at 07:50

## 2020-02-14 RX ADMIN — Medication 10 ML: at 13:41

## 2020-02-14 RX ADMIN — PANTOPRAZOLE SODIUM 40 MG: 40 TABLET, DELAYED RELEASE ORAL at 05:36

## 2020-02-14 RX ADMIN — Medication 1 TABLET: at 21:31

## 2020-02-14 RX ADMIN — PANTOPRAZOLE SODIUM 40 MG: 40 TABLET, DELAYED RELEASE ORAL at 17:00

## 2020-02-14 RX ADMIN — LINAGLIPTIN 5 MG: 5 TABLET, FILM COATED ORAL at 07:50

## 2020-02-14 RX ADMIN — OXYCODONE HYDROCHLORIDE 10 MG: 5 TABLET ORAL at 14:48

## 2020-02-14 RX ADMIN — CARVEDILOL 12.5 MG: 12.5 TABLET, FILM COATED ORAL at 21:31

## 2020-02-14 RX ADMIN — IRON SUCROSE 200 MG: 20 INJECTION, SOLUTION INTRAVENOUS at 13:21

## 2020-02-14 RX ADMIN — ACETAMINOPHEN 650 MG: 325 TABLET, FILM COATED ORAL at 00:09

## 2020-02-14 RX ADMIN — ATORVASTATIN CALCIUM 40 MG: 40 TABLET, FILM COATED ORAL at 21:31

## 2020-02-14 RX ADMIN — SENNOSIDES AND DOCUSATE SODIUM 1 TABLET: 8.6; 5 TABLET ORAL at 21:31

## 2020-02-14 RX ADMIN — SENNOSIDES AND DOCUSATE SODIUM 1 TABLET: 8.6; 5 TABLET ORAL at 07:50

## 2020-02-14 RX ADMIN — OXYCODONE HYDROCHLORIDE 10 MG: 5 TABLET ORAL at 23:18

## 2020-02-14 RX ADMIN — FERROUS SULFATE TAB 325 MG (65 MG ELEMENTAL FE) 325 MG: 325 (65 FE) TAB at 07:50

## 2020-02-14 RX ADMIN — OXYCODONE HYDROCHLORIDE 10 MG: 5 TABLET ORAL at 05:37

## 2020-02-14 RX ADMIN — FUROSEMIDE 40 MG: 40 TABLET ORAL at 07:50

## 2020-02-14 RX ADMIN — Medication 1 TABLET: at 09:00

## 2020-02-14 RX ADMIN — PRAMIPEXOLE DIHYDROCHLORIDE 1.5 MG: 1.5 TABLET ORAL at 21:31

## 2020-02-14 RX ADMIN — OXYCODONE HYDROCHLORIDE 10 MG: 5 TABLET ORAL at 00:09

## 2020-02-14 RX ADMIN — Medication 10 ML: at 21:32

## 2020-02-14 RX ADMIN — OXYCODONE HYDROCHLORIDE 10 MG: 5 TABLET ORAL at 18:54

## 2020-02-14 RX ADMIN — CITALOPRAM HYDROBROMIDE 10 MG: 10 TABLET ORAL at 09:00

## 2020-02-14 ASSESSMENT — PAIN DESCRIPTION - DESCRIPTORS
DESCRIPTORS: ACHING;JABBING
DESCRIPTORS: ACHING;SHOOTING

## 2020-02-14 ASSESSMENT — PAIN SCALES - GENERAL
PAINLEVEL_OUTOF10: 8
PAINLEVEL_OUTOF10: 9
PAINLEVEL_OUTOF10: 6
PAINLEVEL_OUTOF10: 2
PAINLEVEL_OUTOF10: 0
PAINLEVEL_OUTOF10: 6
PAINLEVEL_OUTOF10: 0
PAINLEVEL_OUTOF10: 0
PAINLEVEL_OUTOF10: 3
PAINLEVEL_OUTOF10: 5
PAINLEVEL_OUTOF10: 10
PAINLEVEL_OUTOF10: 0
PAINLEVEL_OUTOF10: 6
PAINLEVEL_OUTOF10: 3

## 2020-02-14 ASSESSMENT — PAIN DESCRIPTION - LOCATION
LOCATION: BACK

## 2020-02-14 ASSESSMENT — PAIN DESCRIPTION - ONSET
ONSET: GRADUAL
ONSET: GRADUAL

## 2020-02-14 ASSESSMENT — PAIN DESCRIPTION - PROGRESSION
CLINICAL_PROGRESSION: NOT CHANGED

## 2020-02-14 ASSESSMENT — PAIN DESCRIPTION - ORIENTATION
ORIENTATION: MID;LOWER
ORIENTATION: MID;LOWER
ORIENTATION: LOWER;MID

## 2020-02-14 ASSESSMENT — PAIN DESCRIPTION - FREQUENCY
FREQUENCY: INTERMITTENT
FREQUENCY: INTERMITTENT

## 2020-02-14 ASSESSMENT — PAIN DESCRIPTION - PAIN TYPE
TYPE: SURGICAL PAIN

## 2020-02-14 ASSESSMENT — PAIN - FUNCTIONAL ASSESSMENT: PAIN_FUNCTIONAL_ASSESSMENT: PREVENTS OR INTERFERES SOME ACTIVE ACTIVITIES AND ADLS

## 2020-02-14 NOTE — PLAN OF CARE
Problem: Risk for Impaired Skin Integrity  Goal: Tissue integrity - skin and mucous membranes  Description  Structural intactness and normal physiological function of skin and  mucous membranes. Outcome: Ongoing  Note: No new skin issues this shift. Skin integrity maintained. Safety maintained this shift. Will continue to monitor. Problem: Falls - Risk of:  Goal: Will remain free from falls  Description  Will remain free from falls  Outcome: Ongoing  Note:   Patient remains free from falls/injuries at this time. Call light within reach. Safety maintained this shift. Will continue to monitor. Problem: Pain:  Goal: Pain level will decrease  Description  Pain level will decrease  Outcome: Ongoing  Pain controlled with around the clock and prn pain medications so far this shift. Safety maintained this shift. Will continue to monitor.

## 2020-02-14 NOTE — DISCHARGE INSTR - COC
Continuity of Care Form    Patient Name: Pauly Reyes   :  1950  MRN:  [id number]    Admit date:  2020  Discharge date:  2020    Code Status Order: Full Code   Advance Directives:   [facility] Documentation     Date/Time Healthcare Directive Type of Healthcare Directive Copy in [facility] [address] Agent's Name Healthcare Agent's Phone Number    20 0012  No, patient does not have an advance directive for healthcare treatment -- -- -- -- --          Admitting Physician:  Ralph Spear MD  PCP: Vanda Deutsch MD    Discharging Nurse: [facility] [place] Unit/Room#: 2606/2606-01  Discharging Unit Phone Number: [phone]    Emergency Contact:   Extended Emergency Contact Information  Primary Emergency Contact: Denver Owens  Address: [address] [place] [address] Av, [address] [place] [address] Phone: [phone]  Work Phone: [phone]  Mobile Phone: [phone]  Relation: Spouse  Hearing or visual needs: None  Other needs: None  Preferred language: English   needed?  No    Past Surgical History:  Past Surgical History:   Procedure Laterality Date    ABDOMEN SURGERY  1976    benign tumor removed near remaining ovary, 1.5 pounds    APPENDECTOMY  1968    appendix ruptured, developed peritonitis    BUNIONECTOMY Left     along with calcium deposits removed   R [address]      negative    COLECTOMY  1969    12 INCHES REMOVED D/T OBSTRUCTION    COLONOSCOPY      CYST REMOVAL Right     right facial    HYSTERECTOMY  1973    taken as a result of recurring cysts    LUMBAR FUSION N/A 2/10/2020    LUMBAR L4-5 POSTERIOR  DECOMPRESSION INSTRUMENTATION FUSION WCEMENT AUGMENTATION/ performed by [place] Serrano MD at [facility].  14    FESS    OVARY REMOVAL  1970    UNILATERAL due to cyst    OVARY REMOVAL      partial, due to cyst   Sharran Mike SINUS SURGERY    UPPER GASTROINTESTINAL ENDOSCOPY N/A 5/31/2019    EGD ESOPHAGOGASTRODUODENOSCOPY performed by Nima Almaraz MD at [facility] N/A 8/5/2019    EGD BIOPSY performed by Camilo Chino MD at [facility] N/A 8/23/2019    EGD BIOPSY performed by Nima Almaraz MD at [address] [facility] [address] Left 3/5/2019    WRIST OPEN REDUCTION INTERNAL FIXATION performed by Hafsa Mcneill MD at [address]       Immunization History:   Immunization History   Administered Date(s) Administered    Influenza, High Dose (Fluzone 65 yrs and older) 11/17/2017    Pneumococcal Conjugate 13-valent (Buzzy Ro) 05/23/2017    Pneumococcal Polysaccharide ([id number]) 05/10/2016       Active Problems:  Patient Active Problem List   Diagnosis Code    Other specified disorders of rotator cuff syndrome of shoulder and allied disorders M75.100    Diverticulosis of large intestine K57.30    Intestinal or peritoneal adhesions with obstruction (postoperative) (postinfection) ([facility] [address] K56.50    Restless legs syndrome (RLS) G25.81    GERD (gastroesophageal reflux disease) K21.9    Essential hypertension I10    Mixed hyperlipidemia E78.2    Other abnormal glucose R73.09    Atherosclerosis I70.90    Lateral epicondylitis M77.10    Allergic rhinitis J30.9    Vitamin D deficiency E55.9    Depression F32.9    Degenerative joint disease (DJD) of hip M16.9    Peripheral edema R60.9    Injury of foot, left Y76.757Y    Facial droop R29.810    CVA (cerebral vascular accident) ([facility] [address] I63.9    BIN (acute kidney injury) ([facility] [address] N17.9    Bradycardia R00.1    Ataxia R27.0    Aphasia R47.01    TIA (transient ischemic attack) G45.9    Need for prophylactic vaccination and inoculation against cholera alone Z23    Chest pain R07.9    Osteopenia M85.80    Lumbago M54.5    Facet arthritis of lumbar region M47.816    Meralgia paresthetica of right side G57.11    Lumbar degenerative disc disease M51.36    Spondylosis of lumbar region without myelopathy or radiculopathy M47.816    Blood poisoning [id number]    Cellulitis of left lower extremity L03. 80    Encounter for medication monitoring Z51.81    Deep vein thrombosis (DVT) of right lower extremity (HCC) I82.401    Lumbar facet arthropathy M47.816    Shortness of breath R06.02    Chronic deep vein thrombosis (DVT) of proximal vein of both lower extremities (HCC) I82.5Y3    Chronic diastolic heart failure (HCC) I50.32    Neurogenic claudication due to lumbar spinal stenosis M48.062    Sacroiliitis (HCC) M46.1    Stage 3 chronic kidney disease (HCC) N18.3    Stage 3 chronic kidney disease (HCC) N18.3    Type 2 diabetes mellitus with circulatory disorder, without long-term current use of insulin (HCC) E11.59    Chronic deep vein thrombosis (DVT) of popliteal vein of right lower extremity (HCC) I82.531    Closed fracture of left wrist S62.102A    B12 deficiency E53.8    Iron deficiency anemia secondary to inadequate dietary iron intake D50.8    Diarrhea due to malabsorption K90.9, R19.7    Closed head injury S09.90XA    Scalp laceration S01. 01XA    Abnormal finding on imaging R93.89    Other irritable bowel syndrome K58.8    Frequent falls R29.6    Double vision H53.2    Muscle soreness M79.10    GI bleed K92.2    Peptic ulcer K27.9    Melena K92.1    PUD (peptic ulcer disease) K27.9    Absolute anemia D64.9    Acute blood loss anemia D62    Acute renal failure (HCC) N17.9    Clostridium difficile infection A49.8    Acquired spondylolisthesis M43.10    Spinal stenosis of lumbar region with neurogenic claudication M48.062    Acute deep vein thrombosis (DVT) of proximal vein of left lower extremity ([facility]) I82.4Y2    Leg swelling M79.89    Back pain M54.9    Neurological disorder G98.8       Isolation/Infection:   Isolation          No Isolation        Patient Infection Status     None to at 11:20 AM    CASE MANAGEMENT/SOCIAL WORK SECTION    Inpatient Status Date: ***    Readmission Risk Assessment Score:  Readmission Risk              Risk of Unplanned Readmission:        25           Discharging to Facility/ [address]  Phone: [phone]  Fax [phone]    Dialysis Facility (if applicable)   · Name:  · Address:  · Dialysis Schedule:  · Phone:  · Fax:    / signature: Electronically signed by PRAFUL Patricia LSW on 2/24/20 at 8:18 AM    PHYSICIAN SECTION    Prognosis: Good    Condition at Discharge: Stable    Rehab Potential (if transferring to Rehab): Good    Recommended Labs or Other Treatments After Discharge: PT/OT to eval and treat, CBC and BMP in 1 week - results to Dr. Irwin Vanegas    Physician Certification: I certify the above information and transfer of Leanna Quintero  is necessary for the continuing treatment of the diagnosis listed and that she requires Home Care for less 30 days.      Update Admission H&P: No change in H&P    PHYSICIAN SIGNATURE:  Electronically signed by Ishmael Figueroa MD on 2/22/20 at 2:04 PM

## 2020-02-14 NOTE — PROGRESS NOTES
Management Training, Strengthening, Balance Training, Functional Mobility Training, Endurance Training, Cognitive Reorientation, Pain Management, Safety Education & Training, Patient/Caregiver Education & Training, Equipment Evaluation, Education, & procurement, Cognitive/Perceptual Training  Patient Goals   Patient goals : \"To be able to do all the things I need to do\"  Short term goals  Time Frame for Short term goals: One week  Short term goal 1: Pt will V/D Good understanding of AE/DME/modified techniques for increased IND with self-care and mobility. Short term goal 2: Pt will perform UB ADLs with SUP and LB ADLs with SBA and Good safety. Short term goal 3: Pt will actively participate in 30+ minutes of therapeutic exercise/functional activities to promote increased IND with self-care and mobility. Short term goal 4: Pt will stand for 5+ minutes with 0-1 UE support, SBA, and no LOB while engaging in functional activity of choice. Short term goal 5: Pt will perform functional mobility and transfers during self-care consistently with SBA, least restrictive mobility device, and Good safety. Long term goals  Time Frame for Long term goals : By discharge  Long term goal 1: Pt will perform BADLs with MI and Good safety. Long term goal 2: Pt will perform functional mobility and transfers during self-care with MI, least restrictive mobility device, and Good safety. Long term goal 3: Pt will stand for 10+ minutes with 0-1 UE support, MI, and no LOB while engaging in functional activity of choice. Long term goal 4: Pt will V/D Good understanding of Fall Prevention strategies for increased safety and IND with self-care and mobility. Long term goal 5: Pt will perform simple meal prep/light housekeeping with MI and Good safety.        02/14/20 0730 02/14/20 1531 02/14/20 1532   OT Individual Minutes   Time In 0730 1230  --    Time Out 0830 1300  --    Minutes 60 30  --    OT Group Minutes   Time In  --   --  5211

## 2020-02-14 NOTE — PROGRESS NOTES
Physical Medicine & Rehabilitation  Progress Note    2/14/2020 11:23 AM     CC: Ambulatory and ADL dysfunction secondary spondylolisthesis with lumbar spinal stenosis status post L4-5 posterior decompression with fusion as well as vertebroplasty L4 and L5     Subjective:   No complaints, completing IV iron. Bowels and bladder okay. Continues use pain medications-notes pain controlled with meds. Advised to try to minimize/decrease. Reviewed side effects including constipation/sedation/respiratory depression/etc.    ROS:  Denies fevers, chills, sweats. No chest pain, palpitations, lightheadedness. Denies coughing, wheezing or shortness of breath. Denies abdominal pain, nausea, diarrhea or constipation. No new areas of joint pain. Denies new areas of numbness or weakness. Denies new anxiety or depression issues. No new skin problems. Rehabilitation:   PT:  Restrictions/Precautions: Fall Risk, Surgical Protocols  Implants present? : Metal implants(L4-L5 PLIF 2/10/20, L wrist fx)  Other position/activity restrictions: up with assistance   Transfers  Sit to Stand: Moderate Assistance(VCs for safe hand placement)  Stand to sit: Moderate Assistance, Minimal Assistance(VCs for safe hand placement, improved eccentric control)  Bed to Chair: Moderate assistance(RW)  Stand Pivot Transfers: 2 Person Assistance, Minimal Assistance(Min x2 without AD, Min x1 with RW)  Squat Pivot Transfers: Minimal Assistance(no AD)  Comment: Cues for RW technique and safe transfer, cues for feet positioning  Ambulation 1  Surface: level tile  Device: Rolling Walker  Assistance: Minimal assistance  Quality of Gait: decreased step length on R, forward trunk and neck flexion, slow tawanda, no LOB  Gait Deviations: Slow Tawanda, Decreased step length  Distance: 40'   Comments: VCs for increased step length R/step-through pattern; posture. Transfers  Sit to Stand:  Moderate Assistance(VCs for safe hand placement)  Stand to sit: Moderate Assistance, Minimal Assistance(VCs for safe hand placement, improved eccentric control)  Bed to Chair: Moderate assistance(RW)  Stand Pivot Transfers: 2 Person Assistance, Minimal Assistance(Min x2 without AD, Min x1 with RW)  Squat Pivot Transfers: Minimal Assistance(no AD)  Comment: Cues for RW technique and safe transfer, cues for feet positioning  Ambulation  Ambulation?: Yes  Ambulation 1  Surface: level tile  Device: Rolling Walker  Assistance: Minimal assistance  Quality of Gait: decreased step length on R, forward trunk and neck flexion, slow tawanda, no LOB  Gait Deviations: Slow Tawanda, Decreased step length  Distance: 40'   Comments: VCs for increased step length R/step-through pattern; posture. Surface: level tile  Ambulation 1  Surface: level tile  Device: Rolling Walker  Assistance: Minimal assistance  Quality of Gait: decreased step length on R, forward trunk and neck flexion, slow tawanda, no LOB  Gait Deviations: Slow Tawanda, Decreased step length  Distance: 40'   Comments: VCs for increased step length R/step-through pattern; posture. OT:  ADL  Feeding: Setup, Increased time to complete  Grooming: Stand by assistance, Setup, Increased time to complete(while seated at sink in w/c)  UE Bathing: Stand by assistance, Setup, Verbal cueing, Increased time to complete(VCs for thoroughness)  LE Bathing: Contact guard assistance, Setup, Verbal cueing, Increased time to complete(CGA while standing at sink; VCs for thoroughness & safety)  UE Dressing: Minimal assistance, Setup, Verbal cueing, Increased time to complete(A to hook bra)  LE Dressing: Moderate assistance, Setup, Verbal cueing, Increased time to complete(A to thread RLE into pants, don B TEDs & adjust B shoes)  Toileting: Contact guard assistance, Setup, Increased time to complete(CGA while standing)  Additional Comments: Pt engaged in bathing, dressing, grooming and toileting tasks this date.  Pt declined shower and requested to bathe at sink while seated in w/c. Min VCs for thoroughness and safety. Balance  Sitting Balance: Supervision(4 to 5 min static )  Standing Balance: Contact guard assistance   Standing Balance  Time: AM: 1-2 minutes x 4  Activity: AM: self-care tasks  Comment: 0-1 UE support  Functional Mobility  Functional - Mobility Device: Rolling Walker  Activity: To/from bathroom  Assist Level: Minimal assistance  Functional Mobility Comments: Min VCs for safety and sequencing during mobility     Bed mobility  Bridging: Stand by assistance(VCs for sequencing, positioning)  Rolling to Left: Stand by assistance  Rolling to Right: Moderate assistance  Supine to Sit: Moderate assistance  Sit to Supine: Moderate assistance  Scooting: Minimal assistance  Comment: Pt seated in w/c at end of session  Transfers  Stand Pivot Transfers: Minimal assistance  Sit to stand: Minimal assistance  Stand to sit: Minimal assistance  Transfer Comments: Mod VCs for hand placement with Fair return   Toilet Transfers  Toilet - Technique: Ambulating  Equipment Used: Grab bars  Toilet Transfer: Minimal assistance  Toilet Transfers Comments: w/ RW     Shower Transfers  Shower Transfers Comments: Pt declined shower and requested to bathe at the sink this date         ST:            Objective:  BP (!) 122/44   Pulse 60   Temp 98.6 °F (37 °C) (Oral)   Resp 20   Ht 5' 3\" (1.6 m)   Wt 168 lb 3.2 oz (76.3 kg)   SpO2 97%   BMI 29.80 kg/m²  I Body mass index is 29.8 kg/m². I   Wt Readings from Last 1 Encounters:   20 168 lb 3.2 oz (76.3 kg)      Temp (24hrs), Av.6 °F (37 °C), Min:98.6 °F (37 °C), Max:98.6 °F (37 °C)         GEN: well developed, well nourished, no acute distress  HEENT: Normocephalic atraumatic, EOMI, mucous membranes pink and moist  CV: RRR, no murmurs, rubs or gallops  PULM: CTAB, no rales or rhonchi. Respirations WNL and unlabored  ABD: soft, NT, ND, +BS and equal  NEURO: A&O x3.  Sensation intact to light touch.   MSK: Motor testing 4/5 UE and LE, limited prox LE due to pain.-No change  EXTREMITIES: No calf tenderness to palpation bilaterally. No edema BLEs  SKIN: warm dry and intact with good turgor  PSYCH: appropriately interactive. Affect WNL. Good spirits-incision with dressing. Medications   Scheduled Meds:   sodium chloride flush  10 mL Intravenous 2 times per day    atorvastatin  40 mg Oral Nightly    calcium carbonate-vitamin D  1 tablet Oral BID    carvedilol  12.5 mg Oral BID    citalopram  10 mg Oral Daily    fenofibrate  160 mg Oral QAM AC    ferrous sulfate  325 mg Oral Daily with breakfast    furosemide  40 mg Oral Daily    hydrALAZINE  50 mg Oral TID    iron sucrose  200 mg Intravenous Q24H    linagliptin  5 mg Oral Daily    lisinopril  10 mg Oral Daily    pantoprazole  40 mg Oral BID AC    pramipexole  1.5 mg Oral Daily    polyethylene glycol  17 g Oral Daily    sennosides-docusate sodium  1 tablet Oral BID     Continuous Infusions:  PRN Meds:.acetaminophen, oxyCODONE **OR** oxyCODONE, sodium chloride flush, magnesium hydroxide, nitroGLYCERIN, ondansetron, bisacodyl, lidocaine     Diagnostics:     CBC:   Recent Labs     02/12/20  0632 02/13/20  0655   WBC 9.8 12.3*   RBC 2.67* 2.67*   HGB 8.4* 8.3*   HCT 25.1* 24.6*   MCV 93.9 92.1   RDW 14.1 14.0    351     BMP:   Recent Labs     02/13/20  0655      K 4.1      CO2 27   BUN 24*   CREATININE 0.65     BNP: No results for input(s): BNP in the last 72 hours. PT/INR: No results for input(s): PROTIME, INR in the last 72 hours. APTT: No results for input(s): APTT in the last 72 hours. CARDIAC ENZYMES: No results for input(s): CKMB, CKMBINDEX, TROPONINT in the last 72 hours.     Invalid input(s): CKTOTAL;3  FASTING LIPID PANEL:  Lab Results   Component Value Date    CHOL 103 05/20/2019    HDL 48 05/20/2019    TRIG 66 05/20/2019     LIVER PROFILE: No results for input(s): AST, ALT, ALB, BILIDIR, BILITOT, ALKPHOS in the last 72 hours. I/O (24Hr): No intake or output data in the 24 hours ending 02/14/20 1123    Glu last 24 hour  Recent Labs     02/13/20  1031 02/13/20  1548 02/13/20  1952 02/14/20  0610   POCGLU 93 96 121* 87       No results for input(s): CLARITYU, COLORU, PHUR, SPECGRAV, PROTEINU, RBCUA, BLOODU, BACTERIA, NITRU, WBCUA, LEUKOCYTESUR, YEAST, GLUCOSEU, BILIRUBINUR in the last 72 hours. Impression/Plan:    1. Ambulatory and ADL dysfunction second spinal listhesis status post L4-5 PLIF and vertebroplasty L4-5 by Dr. Zonia Mejia- continue rehab efforts, making steady progress. 2. Status post L4-5 PLIF and vertebroplasty L4 and L5.-Monitor incision,  3. Pain- Roxicodone-attempt to taper, lidocaine, reviewed with patient risks of narcotics as well as recommendation to decrease.-We will attempt to taper next few days  4. Hypertension/cardiac-Coreg, Lasix, hydralazine, lisinopril, PRN nitroglycerin, Lipitor for hyperlipidemia  5. Depression-Celexa  6. Blood loss anemia- monitor  7. Mild leukocytosis-recheck  8. Diabetes-Tradjenta, monitor glucose-glucose as above  9. GERD, history of GI bleed- Protonix  10. GI-MiraLAX, senna S, Dulcolax, Zofran,-noted history of bowel obstruction  11. History of DVT- chronic right gastroc DVT- resume anticoagulation when okay with general surgery, repeat venous Dopplers 2/19, notes DVT 3 years ago was on Eliquis for 6 months. Has been off of it since then.-Consider resuming DVT prophylaxis once cleared-review with Dr. Zonia Mejia on Monday 12. Internal medicine for medical management      Abiola Ramirez. René Meléndez MD       This note is created with the assistance of a speech recognition program.  While intending to generate a document that actually reflects the content of the visit, the document can still have some errors including those of syntax and sound a like substitutions which may escape proof reading.   In such instances, actual meaning can be extrapolated by contextual diversion

## 2020-02-14 NOTE — PROGRESS NOTES
[facility]: [facility]INT[facility]   ACUTE REHABILITATION  LUNCH GROUP NOTE     Date: 20  Patient Name: Kristan Pugh      Room: 8736/3741-39        MRN: [id number]    : 1950  (72 y.o.)  Gender: female   Referring Practitioner: Spike Molina MD  Diagnosis: Neurological disorder    The Patient participated in the OT Program at  in the [address] [place] [address] on this date. They were in group for 30 minutes     The patient  initiated conversation. They expressed  enjoyment in the setting and people. The patient's pain was monitored as they were  able to tolerate Group activity. Pain level was 0/10. They were Independent  with the Activity.       Mohini GOMEZ

## 2020-02-14 NOTE — PROGRESS NOTES
Facility   [] Outpatient Therapy  [] Follow up at trauma clinic   [] Other:       Treatment completed by:  Liliam STAUFFER-SLP

## 2020-02-14 NOTE — PROGRESS NOTES
[facility] Internal Medicine    CONSULTATION / HISTORY AND PHYSICAL EXAMINATION            Date:   2/14/2020  Patient name:  Leanna Quintero  Date of admission:  2/12/2020  5:58 PM  MRN:   [id number]  Account:  [de-identified]  YOB: 1950  PCP:    Gregorio Stone MD  Room:   260/2606-01  Code Status:    Full Code    Physician Requesting Consult: Hillary Valverde MD    Reason for Consult: medical Management     Chief Complaint:     No chief complaint on file. Back pain    History Obtained From:     patient, electronic medical record    History of Present Illness:   Patient admitted to [facility] acute rehab for rehabilitation. She recently had surgery on her back. Patient has history of chronic back pain with symptoms of neurological claudication. She had spinal stenosis, she underwent L4-L5 decompressive surgery. Patient has history of hypertension, hyperlipidemia, diabetes chronic DVT of right leg  Patient had anemia, started on IV Venofer.   Past Medical History:     Past Medical History:   Diagnosis Date    Allergic rhinitis, cause unspecified     Back pain     lumbar    Bowel obstruction (HCC)     history of due to scar tissue, resolved non-surgically    C. difficile diarrhea     CAD (coronary artery disease)     Cellulitis     left leg    Cellulitis 2017 August    leg left leg/bug bite    Diverticulosis of colon (without mention of hemorrhage)     GERD (gastroesophageal reflux disease)     History of MI (myocardial infarction) 2005    thought due to a blood clot    History of ovarian cyst 1970    had oopherectomy    History of peritonitis 1968    due to ruptured appendix age 13    HTN (hypertension)     Hx of blood clots     right leg    Hyperlipidemia     Intestinal or peritoneal adhesions with obstruction (postoperative) (postinfection) ([facility] [address]     Kidney infection     renal failure/sepsis/spider bite    Lateral epicondylitis  of elbow     Dragon dictation software. Although every effort was made to ensure the accuracy of this automated transcription, some errors in transcription may have occurred.

## 2020-02-14 NOTE — PROGRESS NOTES
Fusion. Pt has lumbar degenerative disc disease. The initial symptoms started 4 years ago. 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 Pain described as constant, aching rating 5/10. Pain is aggravated with standing. Sitting relieves pain. Pt c/o of pain in the lumbar spine area, radiates to the lower limbs worse on the left side. Pt reports pain radiates down left thigh to knee and down right thigh causing tingling. Pt reports difficulty with ambulation due to back pain and \"balance issues\". Uses a cane for ambulation. Has sustained multiple falls with last fall being within last week. No redness, swelling or rashes. Pt has tried injections, physical therapy and NSAIDS. She can no longer take NSAIDS as she developed a bleeding ulcer and required a blood transfusion in August of 2018. Pt currently goes to pain management and has been on Percocet with minimal relief of back pain. Pt admitted to [facility] 2/12/20. Overall Orientation Status: Within Functional Limits  Restrictions/Precautions  Restrictions/Precautions: Fall Risk;Surgical Protocols  Required Braces or Orthoses?: No(Reports brace for B hands- does not wear often)  Implants present? : Metal implants(L4-L5 PLIF 2/10/20, L wrist fx)  Position Activity Restriction  Other position/activity restrictions: up with assistance    Subjective: Patient reporting fatigue today but agreeable to participate.          Comments: Nursing gave okay for PT both AM and PM.     Vital Signs  Patient Currently in Pain: Yes  Pain Assessment: 0-10  Pain Level: 5  Pain Type: Surgical pain  Pain Location: Back  Pain Orientation: Mid;Lower  Non-Pharmaceutical Pain Intervention(s): Emotional support;Repositioned  Response to Pain Intervention: Patient Satisfied        Patient Observation  Observations: Lumbar bandage over incision       Bed Mobility:   Bridging: Stand by assistance(VCs for sequencing, positioning)  Rolling: Rolling Left;Rolling Right;Minimal assistance(Education on log rolling technique with repetition req'd. )  Supine to Sit: Moderate assistance(Education on sequencing, positioning with tactile cues req'd)  Sit to Supine: Moderate assistance(B LE assist, VCs for adjustment once supine for alignment. )  Scooting: Minimal assistance(Hips laterally across bed, small movements/extra time req'd)      Transfers:  Sit to Stand: Moderate Assistance(VCs for safe hand placement)  Stand to sit: Minimal Assistance(VCs for safe hand placement, improved eccentric control)  Bed to Chair: Minimal assistance(RW)     Squat Pivot Transfers: Minimal Assistance(no AD)        Ambulation 1  Surface: level tile  Device: Rolling Walker  Other Apparatus: (IV pole)  Assistance: Minimal assistance  Quality of Gait: Decreased endurance with some dizziness from sit to stand. Slow pace with no LOB. Cues for upright posture. Gait Deviations: Slow Alicia;Decreased step length  Distance: 100 ft  Comments: Verbal cues for safety. Stairs/Curb  Stairs?: Yes  Stairs  # Steps : 10  Stairs Height: 6\"(& 4\")  Rails: Bilateral  Device: No Device  Assistance: Minimal assistance  Comment: Cues for step to pattern. No reports of dizziness when on steps. No LOB           Wheelchair Activities  Propulsion: Yes  Propulsion 1  Propulsion: Manual  Level: Level Tile  Method: RUE;LUE  Level of Assistance: Minimal assistance  Description/ Details: Decreased endurance today. Reporting fatigue. Straight pathe with 1 turn. Distance: 10 ft(Patient reporting she will be getting Iron IV this PM.)         BALANCE Posture: Fair  Sitting - Static: Fair;+(EOM, UE support, no back support)  Sitting - Dynamic: Fair;+(VCs for safe hand placement)  Standing - Static: Fair;-(with RW)  Standing - Dynamic: Fair;-(with RW)    EXERCISES    Other exercises 1: Seated bilat. LE ex 2x10 reps;  Lime green band x 20 reps. Other exercises 2: Supine gluts and quads x 20 reps.   Patient uncomfortable and needed repositioned to sitting. Other exercises 3: Bed mobility working on log rolling   Other Activities  Comment: Poor endurance today. Patient reporting low Hgb, iron, and BP   Baseline BP  L UE  109/49. Nursing aware and authorized PT sessions. Activity Tolerance: Patient limited by pain  PT Equipment Recommendations  Equipment Needed: No  Other Comments  Comments: Chronic R DVT, L lateral rib fx 8-10      Current Treatment Recommendations: Strengthening, Balance Training, Functional Mobility Training, Transfer Training, Endurance Training, Gait Training, Stair training, Home Exercise Program, Safety Education & Training, Patient/Caregiver Education & Training, Equipment Evaluation, Education, & procurement, Positioning    Conditions Requiring Skilled Therapeutic Intervention  Body structures, Functions, Activity limitations: Decreased functional mobility ; Decreased strength;Decreased safe awareness;Decreased cognition;Decreased balance; Increased pain;Decreased posture;Decreased ADL status; Decreased endurance  Discharge Recommendations: Home with assist PRN;Patient would benefit from continued therapy after discharge    Goals  Short term goals  Time Frame for Short term goals: 1 week  Short term goal 1: Pt to demonstrate CGA/SBA bed mobility wtih good log rolling technique. Short term goal 2: Pt to demonstrate min/CGA x1 transfers with good technique. Short term goal 3: Pt to amb 76' with RW CGA. Short term goal 4: Pt to 3-5 steps, min x1 with 1-2 HR. Short term goal 5: Pt to improve standing tolerance to at least 4-5 minutes with 1 UE support, CGA/SBA. Short term goal 6: Pt to improve sitting balance (Static/dynamic) to Good with good posture. Long term goals  Time Frame for Long term goals : by D/C  Long term goal 1: Pt to progress to Mod I bed mobility. Long term goal 2: Pt to perform Mod I transfers with device.   Long term goal 3: Pt to amb 150' with device, Mod I.  Long term goal 4: Pt to perform 12 stairs per home set up, 1 HR, SBA with device prn. Long term goal 5: Pt to improve B LE strength by 1/2 MMG. Long term goal 6: Pt to progress to 2MWT with device Mod I at least 100'. Long term goal 7: Pt to improve Tinetti to at least 20/28 to reduce fall risk and improve static/dynamic balance.        02/14/20 1030 02/14/20 1345   PT Individual Minutes   Time In 1030 1345   Time Out 1115 1430   Minutes 45 45       Electronically signed by Semaj Molina PTA on 2/14/20 at 5:43 PM

## 2020-02-15 LAB
GLUCOSE BLD-MCNC: 123 MG/DL (ref 65–105)
GLUCOSE BLD-MCNC: 86 MG/DL (ref 65–105)
HCT VFR BLD CALC: 23.8 % (ref 36–46)
HEMOGLOBIN: 8.1 G/DL (ref 12–16)
MCH RBC QN AUTO: 31.8 PG (ref 26–34)
MCHC RBC AUTO-ENTMCNC: 34.2 G/DL (ref 31–37)
MCV RBC AUTO: 92.9 FL (ref 80–100)
NRBC AUTOMATED: ABNORMAL PER 100 WBC
PDW BLD-RTO: 13.8 % (ref 11.5–14.9)
PLATELET # BLD: 391 K/UL (ref 150–450)
PMV BLD AUTO: 7.1 FL (ref 6–12)
RBC # BLD: 2.56 M/UL (ref 4–5.2)
WBC # BLD: 9.7 K/UL (ref 3.5–11)

## 2020-02-15 PROCEDURE — 97116 GAIT TRAINING THERAPY: CPT

## 2020-02-15 PROCEDURE — 97530 THERAPEUTIC ACTIVITIES: CPT

## 2020-02-15 PROCEDURE — 6370000000 HC RX 637 (ALT 250 FOR IP): Performed by: PHYSICAL MEDICINE & REHABILITATION

## 2020-02-15 PROCEDURE — 36415 COLL VENOUS BLD VENIPUNCTURE: CPT

## 2020-02-15 PROCEDURE — 99232 SBSQ HOSP IP/OBS MODERATE 35: CPT | Performed by: PHYSICAL MEDICINE & REHABILITATION

## 2020-02-15 PROCEDURE — 2580000003 HC RX 258: Performed by: INTERNAL MEDICINE

## 2020-02-15 PROCEDURE — 82947 ASSAY GLUCOSE BLOOD QUANT: CPT

## 2020-02-15 PROCEDURE — 1180000000 HC REHAB R&B

## 2020-02-15 PROCEDURE — 6370000000 HC RX 637 (ALT 250 FOR IP): Performed by: INTERNAL MEDICINE

## 2020-02-15 PROCEDURE — 85027 COMPLETE CBC AUTOMATED: CPT

## 2020-02-15 PROCEDURE — 97535 SELF CARE MNGMENT TRAINING: CPT

## 2020-02-15 PROCEDURE — 99231 SBSQ HOSP IP/OBS SF/LOW 25: CPT | Performed by: INTERNAL MEDICINE

## 2020-02-15 RX ADMIN — SENNOSIDES AND DOCUSATE SODIUM 1 TABLET: 8.6; 5 TABLET ORAL at 09:44

## 2020-02-15 RX ADMIN — Medication 1 TABLET: at 20:13

## 2020-02-15 RX ADMIN — FERROUS SULFATE TAB 325 MG (65 MG ELEMENTAL FE) 325 MG: 325 (65 FE) TAB at 09:49

## 2020-02-15 RX ADMIN — CARVEDILOL 12.5 MG: 12.5 TABLET, FILM COATED ORAL at 09:43

## 2020-02-15 RX ADMIN — PANTOPRAZOLE SODIUM 40 MG: 40 TABLET, DELAYED RELEASE ORAL at 06:23

## 2020-02-15 RX ADMIN — Medication 1 TABLET: at 09:45

## 2020-02-15 RX ADMIN — PANTOPRAZOLE SODIUM 40 MG: 40 TABLET, DELAYED RELEASE ORAL at 16:39

## 2020-02-15 RX ADMIN — PRAMIPEXOLE DIHYDROCHLORIDE 1.5 MG: 1.5 TABLET ORAL at 20:13

## 2020-02-15 RX ADMIN — POLYETHYLENE GLYCOL 3350 17 G: 17 POWDER, FOR SOLUTION ORAL at 09:43

## 2020-02-15 RX ADMIN — OXYCODONE HYDROCHLORIDE 10 MG: 5 TABLET ORAL at 03:28

## 2020-02-15 RX ADMIN — OXYCODONE HYDROCHLORIDE 10 MG: 5 TABLET ORAL at 16:39

## 2020-02-15 RX ADMIN — CITALOPRAM HYDROBROMIDE 10 MG: 10 TABLET ORAL at 09:44

## 2020-02-15 RX ADMIN — LINAGLIPTIN 5 MG: 5 TABLET, FILM COATED ORAL at 09:44

## 2020-02-15 RX ADMIN — OXYCODONE HYDROCHLORIDE 10 MG: 5 TABLET ORAL at 09:43

## 2020-02-15 RX ADMIN — FUROSEMIDE 40 MG: 40 TABLET ORAL at 09:44

## 2020-02-15 RX ADMIN — CARVEDILOL 12.5 MG: 12.5 TABLET, FILM COATED ORAL at 20:12

## 2020-02-15 RX ADMIN — ATORVASTATIN CALCIUM 40 MG: 40 TABLET, FILM COATED ORAL at 20:12

## 2020-02-15 RX ADMIN — LISINOPRIL 10 MG: 10 TABLET ORAL at 09:44

## 2020-02-15 RX ADMIN — OXYCODONE HYDROCHLORIDE 10 MG: 5 TABLET ORAL at 21:56

## 2020-02-15 RX ADMIN — Medication 10 ML: at 09:47

## 2020-02-15 RX ADMIN — SENNOSIDES AND DOCUSATE SODIUM 1 TABLET: 8.6; 5 TABLET ORAL at 20:12

## 2020-02-15 RX ADMIN — FENOFIBRATE 160 MG: 160 TABLET ORAL at 06:23

## 2020-02-15 ASSESSMENT — PAIN SCALES - GENERAL
PAINLEVEL_OUTOF10: 5
PAINLEVEL_OUTOF10: 8
PAINLEVEL_OUTOF10: 8
PAINLEVEL_OUTOF10: 7
PAINLEVEL_OUTOF10: 8
PAINLEVEL_OUTOF10: 8
PAINLEVEL_OUTOF10: 3
PAINLEVEL_OUTOF10: 7
PAINLEVEL_OUTOF10: 4

## 2020-02-15 ASSESSMENT — PAIN DESCRIPTION - PAIN TYPE
TYPE: SURGICAL PAIN
TYPE: ACUTE PAIN
TYPE: SURGICAL PAIN

## 2020-02-15 ASSESSMENT — PAIN DESCRIPTION - LOCATION
LOCATION: BACK;LEG
LOCATION: BACK;LEG
LOCATION: BACK

## 2020-02-15 ASSESSMENT — PAIN DESCRIPTION - FREQUENCY
FREQUENCY: INTERMITTENT

## 2020-02-15 ASSESSMENT — PAIN SCALES - WONG BAKER: WONGBAKER_NUMERICALRESPONSE: 0

## 2020-02-15 ASSESSMENT — PAIN DESCRIPTION - ORIENTATION
ORIENTATION: LOWER
ORIENTATION: LEFT;LOWER
ORIENTATION: LOWER;MID;LEFT

## 2020-02-15 ASSESSMENT — PAIN DESCRIPTION - PROGRESSION
CLINICAL_PROGRESSION: GRADUALLY WORSENING
CLINICAL_PROGRESSION: NOT CHANGED

## 2020-02-15 ASSESSMENT — PAIN DESCRIPTION - DESCRIPTORS: DESCRIPTORS: ACHING;SHOOTING

## 2020-02-15 NOTE — PROGRESS NOTES
Physical Therapy  Facility/Department: [facility] ACUTE REHAB  Daily Treatment Note  NAME: Emile Caballero  : 1950  MRN: [id number]    Date of Service: 2/15/2020    Discharge Recommendations:  Home with assist PRN, Patient would benefit from continued therapy after discharge   PT Equipment Recommendations  Equipment Needed: No    Assessment   Body structures, Functions, Activity limitations: Decreased functional mobility ; Decreased strength;Decreased safe awareness;Decreased cognition;Decreased balance; Increased pain;Decreased posture;Decreased ADL status; Decreased endurance  Assessment: Pt most limited by pain and activity tolerance at this time. Pt is requiring assist for all functional mobility. Pt has supportive  that can assist as needed at d/c. Treatment Diagnosis: Impaired functional mobility 2* PLIF L4-L5  Prognosis: Good  Decision Making: Medium Complexity  History: Emile Caballero is 72 y.o.,  female, undergoing preadmission testing for Spondylolisthesis with scheduled Lumbar L4-L5 Posterior Decompression/ Fusion. Pt has lumbar degenerative disc disease. The initial symptoms started 4 years ago. 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 Pain described as constant, aching rating 5/10. Pain is aggravated with standing. Sitting relieves pain. Pt c/o of pain in the lumbar spine area, radiates to the lower limbs worse on the left side. Pt reports pain radiates down left thigh to knee and down right thigh causing tingling. Pt reports difficulty with ambulation due to back pain and \"balance issues\". Uses a cane for ambulation. Has sustained multiple falls with last fall being within last week. No redness, swelling or rashes. Pt has tried injections, physical therapy and NSAIDS. She can no longer take NSAIDS as she developed a bleeding ulcer and required a blood transfusion in 2018. Pt currently goes to pain management and has been on Percocet with minimal relief of back pain.  Pt admitted to [facility] 2/12/20. Exam: ROM, MMT, bed mobility, transfers, amb, balance  Clinical Presentation: Pt alert, pleasant, agreeable to PT. Barriers to Learning: none  REQUIRES PT FOLLOW UP: Yes  Activity Tolerance  Activity Tolerance: Patient limited by pain     Patient Diagnosis(es): There were no encounter diagnoses. has a past medical history of Allergic rhinitis, cause unspecified, Back pain, Bowel obstruction (HCC), C. difficile diarrhea, CAD (coronary artery disease), Cellulitis, Cellulitis, Diverticulosis of colon (without mention of hemorrhage), GERD (gastroesophageal reflux disease), History of MI (myocardial infarction), History of ovarian cyst, History of peritonitis, HTN (hypertension), Hx of blood clots, Hyperlipidemia, Intestinal or peritoneal adhesions with obstruction (postoperative) (postinfection) ([facility] [address] Kidney infection, Lateral epicondylitis  of elbow, Muscle strain, Other abnormal glucose, PONV (postoperative nausea and vomiting), Pre-diabetes, Restless legs syndrome (RLS), TIA (transient ischemic attack), Vitamin D deficiency, and Wears glasses. has a past surgical history that includes Ovary removal (1970); colectomy (2509); Appendectomy (1968); Ovary removal (1971); Hysterectomy (1973); Bunionectomy (Left); sinus surgery (2004); Colonoscopy; other surgical history (8/14/14); cyst removal (Right); Wrist fracture surgery (Left, 3/5/2019); Upper gastrointestinal endoscopy (N/A, 5/31/2019); Upper gastrointestinal endoscopy (N/A, 8/5/2019); Upper gastrointestinal endoscopy (N/A, 8/23/2019); Abdomen surgery (1976); Cardiac catheterization; Cardiac catheterization (2005); and lumbar fusion (N/A, 2/10/2020).     Restrictions  Restrictions/Precautions  Restrictions/Precautions: Fall Risk, Surgical Protocols  Required Braces or Orthoses?: No  Implants present? : Metal implants  Position Activity Restriction  Other position/activity restrictions: up with assistance  Subjective   General  Chart assistance  Quality of Gait 3: slow staggered negotiation of RW  Gait Deviations: Slow Alicia;Deviated path;Decreased head and trunk rotation;Staggers; Shuffles  Distance: 180  Stairs/Curb  Stairs?: Yes  Stairs  # Steps : 10  Stairs Height: 6\"(4)  Rails: Bilateral(esceding right rail step too formation)  Device: No Device  Assistance: Contact guard assistance  Comment: Cues for step to pattern. No reports of dizziness when on steps. No LOB(complaint of pain in low back descending steps)  Propulsion 1  Propulsion: Manual  Level: Level Tile  Method: RUE;LUE  Level of Assistance: Contact guard assistance  Description/ Details: in out of dome cones weaving  Distance: 40     Balance  Posture: Fair  Sitting - Static: Fair;+  Sitting - Dynamic: Fair;+  Standing - Static: Fair;-  Standing - Dynamic: Fair;-  Other exercises  Other exercises?: Yes  Other exercises 1: Seated bilat. LE ex 2x15 reps;  Lime green band x 20 reps. Other exercises 2: Supine gluts and quads x 20 reps. Patient uncomfortable and needed repositioned to sitting. Other exercises 3: Bed mobility working on log rolling   Other exercises 4: STS x3 (AM) x2 (PM)  Other exercises 5: stand pivot x2 with RW, CGA for safety. Other exercises 6: Supine glute set, x3  Other exercises 7: Bed mobility x2. Other exercises 8: UBE 6 min 3/3         Comment:  Patient reporting low Hgb, iron. To get more treatments today and hopes to get the dorsal IV taken out after that completion. No issues noted today. G-Code     OutComes Score                                                     AM-PAC Score             Goals  Short term goals  Time Frame for Short term goals: 1 week  Short term goal 1: Pt to demonstrate CGA/SBA bed mobility wtih good log rolling technique. Short term goal 2: Pt to demonstrate min/CGA x1 transfers with good technique. Short term goal 3: Pt to amb 76' with RW CGA. Short term goal 4: Pt to 3-5 steps, min x1 with 1-2 HR.   Short term goal 5: Pt to improve standing tolerance to at least 4-5 minutes with 1 UE support, CGA/SBA. Short term goal 6: Pt to improve sitting balance (Static/dynamic) to Good with good posture. Long term goals  Time Frame for Long term goals : by D/C  Long term goal 1: Pt to progress to Mod I bed mobility. Long term goal 2: Pt to perform Mod I transfers with device. Long term goal 3: Pt to amb 150' with device, Mod I.  Long term goal 4: Pt to perform 12 stairs per home set up, 1 HR, SBA with device prn. Long term goal 5: Pt to improve B LE strength by 1/2 MMG. Long term goal 6: Pt to progress to 2MWT with device Mod I at least 100'. Long term goal 7: Pt to improve Tinetti to at least 20/28 to reduce fall risk and improve static/dynamic balance.   Patient Goals   Patient goals : \"I want to be able to get in and out of bed on my own\"    Plan    Plan  Times per week: 1.5 hr/day, 5-7 days/week  Specific instructions for Next Treatment: progress gait/standing tolerance, strengthening, balance  Current Treatment Recommendations: Strengthening, Balance Training, Functional Mobility Training, Transfer Training, Endurance Training, Gait Training, Stair training, Home Exercise Program, Safety Education & Training, Patient/Caregiver Education & Training, Equipment Evaluation, Education, & procurement, Positioning  Safety Devices  Type of devices: Gait belt, All fall risk precautions in place, Call light within reach     Therapy Time     02/15/20 1350 02/15/20 1351   PT Individual Minutes   Time In 0730 1215   Time Out 0830 1300   Minutes 60 380 [facility]

## 2020-02-15 NOTE — PROGRESS NOTES
[facility] Internal Medicine    CONSULTATION / HISTORY AND PHYSICAL EXAMINATION            Date:   2/15/2020  Patient name:  Leanna Quintero  Date of admission:  2/12/2020  5:58 PM  MRN:   [id number]  Account:  [de-identified]  YOB: 1950  PCP:    Gregorio Stone MD  Room:   260/2606-01  Code Status:    Full Code    Physician Requesting Consult: Hillary Valverde MD    Reason for Consult: Medical management    Chief Complaint:     No chief complaint on file. History Obtained From:     patient, electronic medical record    History of Present Illness/ Interval History:     Patient admitted to [facility] acute rehab for rehabilitation. She recently had surgery on her back. Patient has history of chronic back pain with symptoms of neurological claudication. She had spinal stenosis, she underwent L4-L5 decompressive surgery. Patient has history of hypertension, hyperlipidemia, diabetes chronic DVT of right leg  Patient had anemia, started on IV Venofer.   Past Medical History:     Past Medical History:   Diagnosis Date    Allergic rhinitis, cause unspecified     Back pain     lumbar    Bowel obstruction (HCC)     history of due to scar tissue, resolved non-surgically    C. difficile diarrhea     CAD (coronary artery disease)     Cellulitis     left leg    Cellulitis 2017 August    leg left leg/bug bite    Diverticulosis of colon (without mention of hemorrhage)     GERD (gastroesophageal reflux disease)     History of MI (myocardial infarction) 2005    thought due to a blood clot    History of ovarian cyst 1970    had oopherectomy    History of peritonitis 1968    due to ruptured appendix age 13    HTN (hypertension)     Hx of blood clots     right leg    Hyperlipidemia     Intestinal or peritoneal adhesions with obstruction (postoperative) (postinfection) ([facility] [address]     Kidney infection     renal failure/sepsis/spider bite    Lateral epicondylitis  of elbow to manage pain 2/12/20 2/19/20 Yes Thressa Gowers, MD   furosemide (LASIX) 40 MG tablet Take 1 tablet by mouth daily 1/20/20  Yes Denis Velasquez MD   ferrous sulfate 325 (65 Fe) MG tablet Take 1 tablet by mouth daily (with breakfast) 1/3/20  Yes Nafisa Evans MD   VITAMIN D, ERGOCALCIFEROL, PO Take by mouth   Yes Historical Provider, MD   hydrALAZINE (APRESOLINE) 50 MG tablet Take 1 tablet by mouth 3 times daily 1/2/20  Yes Denis Velasquez MD   citalopram (CELEXA) 10 MG tablet Take 1 tablet by mouth daily 1/2/20  Yes Denis Velasquez MD   fenofibrate micronized (LOFIBRA) 134 MG capsule Take 1 capsule by mouth every morning (before breakfast) 11/26/19  Yes Denis Velasquez MD   lisinopril (PRINIVIL;ZESTRIL) 10 MG tablet Take 1 tablet by mouth daily 11/26/19  Yes Denis Velasquez MD   pramipexole (MIRAPEX) 1 MG tablet Take 1.5 tablets by mouth daily 11/26/19  Yes Denis Velasquez MD   atorvastatin (LIPITOR) 80 MG tablet Take 0.5 tablets by mouth nightly 11/11/19  Yes Rekha Figueroa MD   pantoprazole (PROTONIX) 40 MG tablet Take 1 tablet by mouth 2 times daily (before meals) 11/11/19  Yes Rekha Figueroa MD   SITagliptin (JANUVIA) 100 MG tablet Take 0.5 tablets by mouth daily 10/10/19  Yes Gold Schwartz MD   carvedilol (COREG) 12.5 MG tablet Take 1 tablet by mouth 2 times daily 8/13/19  Yes MAIK Wilson - CNP   Calcium Carbonate-Vitamin D (CALCIUM 500/D) 500-125 MG-UNIT TABS Take 1 tablet by mouth 2 times daily    Yes Historical Provider, MD   Lancets MISC 1 each by Does not apply route daily 10/10/19   Galindo Graves MD   blood glucose monitor strips Test 2 times a day & as needed for symptoms of irregular blood glucose. 10/10/19   Galindo Graves MD   ondansetron (ZOFRAN) 4 MG tablet Take 1 tablet by mouth daily as needed for Nausea or Vomiting 8/19/19   Galindo Graves MD   lidocaine (XYLOCAINE) 5 % ointment 4-5 times a day to your right thigh for pain.  3/15/19   Sam Santos MD   nitroGLYCERIN (NITROSTAT) 0.4 MG SL tablet Place 1 tablet under the tongue every 5 minutes as needed for Chest pain 18   April Couch MD        Allergies:     Mobic [meloxicam]; Bactrim [sulfamethoxazole-trimethoprim]; Codeine; and Seasonal    Social History:     Tobacco:    reports that she quit smoking about 2 years ago. Her smoking use included cigarettes. She started smoking about 24 years ago. She has a 10.00 pack-year smoking history. She has never used smokeless tobacco.  Alcohol:      reports no history of alcohol use. Drug Use:  reports no history of drug use. Family History:     Family History   Problem Relation Age of Onset    Stroke Mother     Diabetes Mother     Heart Disease Mother     High Blood Pressure Mother     Heart Disease Father     Heart Disease Brother     High Blood Pressure Brother     Heart Disease Maternal Grandmother     High Blood Pressure Sister        Review of Systems:     Positive and Negative as described in HPI. Constitutional: Negative for chills, fatigue, fever and unexpected weight change. HENT: Negative for ear discharge, facial swelling, nosebleeds, sore throat, trouble swallowing and voice change. Eyes: Negative for redness and visual disturbance. Respiratory: Negative for cough, shortness of breath and wheezing. Cardiovascular: Negative for chest pain, palpitations and leg swelling. Gastrointestinal: Negative for abdominal pain, blood in stool, diarrhea and vomiting. Genitourinary: Negative for difficulty urinating, dysuria and flank pain. Musculoskeletal: Back pain and leg weakness  Skin: Negative for color change and rash. Neurological: Negative for dizziness, seizures, syncope and headaches. Hematological: Negative for adenopathy. Does not bruise/bleed easily.      Physical Exam:     BP (!) 125/43   Pulse 72   Temp 98.4 °F (36.9 °C) (Oral)   Resp 18   Ht 5' 3\" (1.6 m)   Wt 167 lb (75.8 kg)   SpO2 96%   BMI 29.58 kg/m²   Temp (24hrs), Av.4 °F (36.9 °C), Min:98.4 °F (36.9 °C), Max:98.4 °F (36.9 °C)      General appearance:  alert, cooperative and no distress  Eyes: Anicteric sclera. Pupils are equally round and reactive to light. Extraocular movements are intact. Lungs:  clear to auscultation bilaterally, normal effort  Heart:  regular rate and rhythm, no murmur  Abdomen:  soft, nontender, nondistended, normal bowel sounds, no masses, hepatomegaly, splenomegaly  Extremities:  no edema, redness, tenderness in the calves  Skin:  no gross lesions, rashes, induration  Neuro:  Alert, oriented X 3, Gait normal. Non-focal.    Investigations:      Laboratory Testing:  Lab Results   Component Value Date    WBC 9.7 02/15/2020    HGB 8.1 (L) 02/15/2020    HCT 23.8 (L) 02/15/2020    MCV 92.9 02/15/2020     02/15/2020     Lab Results   Component Value Date     02/13/2020    K 4.1 02/13/2020     02/13/2020    CO2 27 02/13/2020    BUN 24 02/13/2020    CREATININE 0.65 02/13/2020    GLUCOSE 133 02/13/2020    CALCIUM 8.6 02/13/2020         Assessment :      Primary Problem  Lumbar degenerative disc disease    Active Hospital Problems    Diagnosis Date Noted    Neurological disorder [G98.8] 02/12/2020    Iron deficiency anemia secondary to inadequate dietary iron intake [D50.8] 05/23/2019    Type 2 diabetes mellitus with circulatory disorder, without long-term current use of insulin (HCC) [E11.59] 03/18/2019    Stage 3 chronic kidney disease (HCC) [N18.3] 03/18/2019    Chronic deep vein thrombosis (DVT) of popliteal vein of right lower extremity (HCC) [I82.531] 03/18/2019    Chronic deep vein thrombosis (DVT) of proximal vein of both lower extremities (HCC) [I82.5Y3] 01/29/2018    Lumbar degenerative disc disease [M51.36]     Essential hypertension [I10]     GERD (gastroesophageal reflux disease) [K21.9]        Plan:     1. Hypertension, well controlled, continue with Coreg, lisinopril.   2. Diabetes type 2 on Tradjenta, low-dose sliding scale-blood glucose well controlled. 3. Hyperlipidemia-continue Lipitor. 4. Acute blood loss anemia after surgery, completed 2 doses of IV iron, hemoglobin stable. 5. Chronic DVT of right leg- DVT prophylaxis to be started on 2/17 per surgical recs. Consultations:   IP CONSULT TO INTERNAL MEDICINE  IP CONSULT TO SOCIAL WORK  IP CONSULT TO INTERNAL MEDICINE  IP CONSULT TO DIETITIAN  IP CONSULT TO Narayan Felix MD  2/15/2020  3:58 PM    Copy sent to Dr. Carlos Bird MD    Please note that this chart was generated using voice recognition Dragon dictation software. Although every effort was made to ensure the accuracy of this automated transcription, some errors in transcription may have occurred.

## 2020-02-15 NOTE — PLAN OF CARE
Problem: Falls - Risk of:  Goal: Will remain free from falls  Description  Will remain free from falls  Outcome: Ongoing  Note:   No falls during this shift. Call light is within reach. Side rails up x2 with bed in lowest position. Patient safety maintained. Problem: Falls - Risk of:  Goal: Absence of physical injury  Description  Absence of physical injury  Outcome: Ongoing  Note:   No injury this shift. Patient safety maintained. Problem: Pain:  Description  Pain management should include both nonpharmacologic and pharmacologic interventions. Goal: Pain level will decrease  Description  Pain level will decrease  Outcome: Ongoing  Note:   Patients pain level decreased with PRN medications.

## 2020-02-15 NOTE — PROGRESS NOTES
Walker  Other Apparatus: (IV pole)  Assistance: Minimal assistance  Quality of Gait: Decreased endurance with some dizziness from sit to stand. Slow pace with no LOB. Cues for upright posture. Gait Deviations: Slow Alicia, Decreased step length  Distance: 100 ft  Comments: Verbal cues for safety. Surface: level tile  Ambulation 1  Surface: level tile  Device: Rolling Walker  Other Apparatus: (IV pole)  Assistance: Minimal assistance  Quality of Gait: Decreased endurance with some dizziness from sit to stand. Slow pace with no LOB. Cues for upright posture. Gait Deviations: Slow Alicia, Decreased step length  Distance: 100 ft  Comments: Verbal cues for safety. OT:  ADL  Feeding: Setup, Increased time to complete  Grooming: Stand by assistance, Setup, Increased time to complete  UE Bathing: Stand by assistance, Setup, Verbal cueing, Increased time to complete  LE Bathing: Contact guard assistance, Setup, Verbal cueing, Increased time to complete(with use of LHS for feet)  UE Dressing: Stand by assistance, Setup, Verbal cueing, Increased time to complete  LE Dressing: Contact guard assistance, Setup, Increased time to complete(A for B PRAVEEN hose; CGA standing to pull over hips)  Toileting: Contact guard assistance, Setup, Increased time to complete  Additional Comments: Pt engaged in showering routine this date. Bandage over lumbar incision and IV on L wrist covered with plastic during shower to keep dry. Pt requires Min/Mod VCs for safety and attention to task.          Balance  Sitting Balance: Supervision  Standing Balance: Contact guard assistance   Standing Balance  Time: AM: 1-2 minutes x 4; 2-3 minutes x 1  Activity: AM: self-care  Comment: 0-1 UE support   Functional Mobility  Functional - Mobility Device: Rolling Walker  Activity: To/from bathroom  Assist Level: Minimal assistance  Functional Mobility Comments: Min VCs for safety and sequencing during mobility     Bed mobility  Bridging: Stand by 02/14/20  1119 02/14/20  1601 02/14/20  1927 02/15/20  0600   POCGLU 106* 105 113* 86       No results for input(s): CLARITYU, COLORU, PHUR, SPECGRAV, PROTEINU, RBCUA, BLOODU, BACTERIA, NITRU, WBCUA, LEUKOCYTESUR, YEAST, GLUCOSEU, BILIRUBINUR in the last 72 hours. Impression/Plan:    1. Ambulatory and ADL dysfunction second spinal listhesis status post L4-5 PLIF and vertebroplasty L4-5 by Dr. Allison James- continue rehab efforts, making steady progress. 2. Status post L4-5 PLIF and vertebroplasty L4 and L5.-Monitor incision,  3. Pain- Roxicodone-attempt to taper, lidocaine, reviewed with patient risks of narcotics as well as recommendation to decrease.-We will attempt to taper next few days-will decrease to every 6 hours tomorrow or Monday  4. Hypertension/cardiac-Coreg, Lasix, hydralazine, lisinopril, PRN nitroglycerin, Lipitor for hyperlipidemia  5. Depression-Celexa  6. Blood loss anemia- monitor, mild trend down-recheck few days  7. Mild leukocytosis-recheck  8. Diabetes-Tradjenta, monitor glucose-glucose as above  9. GERD, history of GI bleed- Protonix  10. GI-MiraLAX, senna S, Dulcolax, Zofran,-noted history of bowel obstruction  11. History of DVT- chronic right gastroc DVT- resume anticoagulation when okay with general surgery, repeat venous Dopplers 2/19, notes DVT 3 years ago was on Eliquis for 6 months. Has been off of it since then.-Consider resuming DVT prophylaxis once cleared-review with Dr. Allison James on Monday  12. Internal medicine for medical management      Domingo Abdul. Jefferson Villanueva MD       This note is created with the assistance of a speech recognition program.  While intending to generate a document that actually reflects the content of the visit, the document can still have some errors including those of syntax and sound a like substitutions which may escape proof reading.   In such instances, actual meaning can be extrapolated by contextual diversion

## 2020-02-15 NOTE — PROGRESS NOTES
[address]   ACUTE REHABILITATION OCCUPATIONAL THERAPY  DAILY NOTE    Date: 2/15/20  Patient Name: Emile Caballero      Room: 5814/6152-33    MRN: [id number]   : 1950  (72 y.o.)  Gender: female   Referring Practitioner: Dr. Rae/Dr. Omaira Phan  Diagnosis: Neurological disorder; Lumbar L4-5 PLIF Vertebroplasty L4 & L5 2/10/20       Restrictions  Restrictions/Precautions: Fall Risk, Surgical Protocols  Implants present? : Metal implants  Other position/activity restrictions: up with assistance  Required Braces or Orthoses?: No  Equipment Used: Other, Wheelchair, Bed    Subjective  Subjective: \"It just hurts so bad - I can't sit back all the way\" Pt referring to pain in LLE. Patient Currently in Pain: Yes  Pain Level: 8  Pain Location: Back;Leg  Pain Orientation: Left; Lower  Restrictions/Precautions: Fall Risk;Surgical Protocols     Patient Observation  Observations: Lumbar bandage over incision, covered with plastic bag during shower to protect bandage  Pain Assessment  Pain Assessment: 0-10  Pain Level: 8  Patient's Stated Pain Goal: No pain  Pain Type: Acute pain  Pain Location: Back, Leg  Pain Orientation: Left, Lower  Pain Descriptors: Tender, Sore  Pain Frequency: Intermittent(worse with movement and certain positions)  Clinical Progression: Gradually worsening  Response to Pain Intervention: Patient Satisfied    Objective  Cognition  Overall Cognitive Status: Impaired  Arousal/Alertness: Delayed responses to stimuli  Following Directions: Follows two step commands  Attention Span: Attends with cues to redirect  Memory: Decreased short term memory;Decreased recall of recent events  Following Commands:  Follows multistep commands with repetition  Safety Judgement: Decreased awareness of need for safety  Awareness of Errors: Decreased awareness of errors  Insights: Decreased awareness of deficits  Sequencing and Organization: Assistance required to implement solutions;Assistance required to generate solutions;Assistance required to identify errors made  Perception  Overall Perceptual Status: Impaired  Unilateral Attention: Cues to maintain midline in sitting;Cues to maintain midline in standing  Balance  Sitting Balance: Supervision  Standing Balance: Contact guard assistance  Bed mobility  Comment: Pt seated in w/c at start and end of session  Transfers  Stand Pivot Transfers: Minimal assistance  Sit to stand: Contact guard assistance  Stand to sit: Contact guard assistance  Transfer Comments: Min VCs for hand placement with Fair return  Standing Balance  Time: AM: 1-2 minutes x 4; 2-3 minutes x 1  Activity: AM: self-care  Comment: 0-1 UE support   Functional Mobility  Functional - Mobility Device: Rolling Walker  Activity: To/from bathroom  Assist Level: Minimal assistance  Functional Mobility Comments: Min VCs for safety and sequencing during mobility  Toilet Transfers  Toilet - Technique: Ambulating  Equipment Used: Grab bars  Toilet Transfer: Minimal assistance  Toilet Transfers Comments: w/ RW  Shower Transfers  Shower - Transfer From: Walker  Shower - Transfer Type: To and From  Shower - Transfer To: Transfer tub bench  Shower - Technique: Ambulating  Shower Transfers: Minimal assistance  Shower Transfers Comments: Min VCs for hand placement with Fair return                          ADL  Feeding: Setup; Increased time to complete  Grooming: Stand by assistance;Setup; Increased time to complete  UE Bathing: Stand by assistance;Setup;Verbal cueing; Increased time to complete  LE Bathing: Contact guard assistance;Setup;Verbal cueing; Increased time to complete(with use of LHS for feet)  UE Dressing: Stand by assistance;Setup;Verbal cueing; Increased time to complete  LE Dressing: Contact guard assistance;Setup; Increased time to complete(A for B PRAVEEN hose; CGA standing to pull over hips)  Toileting: Contact guard assistance;Setup; Increased time to complete  Additional Comments: Pt engaged in showering routine this date. Bandage over lumbar incision and IV on L wrist covered with plastic during shower to keep dry. Pt requires Min/Mod VCs for safety and attention to task. Instrumental ADL's  Instrumental ADLs: Yes  Meal Prep  Meal Prep Level: Walker  Meal Prep Level of Assistance: Contact guard assistance  Meal Preparation: CGA while standing at RW level to access fridge. Mod VCs for safety awareness to access milk/items in fridge in a safe manner. [employer] Re-entry  Community Re-Entry Level: 2 [facility] Re-entry Level of Assistance: Contact guard assistance  Community Re-Entry: Pt engaged in community re-entry task of engaging gift shop at [employer] with [address]. Min VCs for orientation to navigate back to room in w/c from gift shop. Assessment  Performance deficits / Impairments: Decreased functional mobility ; Decreased ADL status; Decreased strength;Decreased safe awareness;Decreased endurance;Decreased cognition;Decreased balance;Decreased high-level IADLs;Decreased fine motor control;Decreased coordination;Decreased posture;Decreased sensation  Prognosis: Good  Discharge Recommendations: Patient would benefit from continued therapy after discharge  Activity Tolerance: Patient Tolerated treatment well  Safety Devices in place: Yes  Type of devices: Call light within reach;Gait belt;Patient at risk for falls;Nurse notified; Left in chair;Chair alarm in place; All fall risk precautions in place          Patient Education: safety with RW, safety with shower transfer, safety with navigating community in [place], purpose of OT  Patient Goals   Patient goals :  \"To be able to do all the things I need to do\"  Learner:patient  Method: demonstration and explanation       Outcome: needs reinforcement and asked questions        Plan  Plan  Times per week: 5-7  Times per day: Twice a day  Current Treatment Recommendations: Self-Care / ADL, Home Management Training, Strengthening, Balance Training, Functional Mobility Training, Endurance Training, Cognitive Reorientation, Pain Management, Safety Education & Training, Patient/Caregiver Education & Training, Equipment Evaluation, Education, & procurement, Cognitive/Perceptual Training  Patient Goals   Patient goals : \"To be able to do all the things I need to do\"  Short term goals  Time Frame for Short term goals: One week  Short term goal 1: Pt will V/D Good understanding of AE/DME/modified techniques for increased IND with self-care and mobility. Short term goal 2: Pt will perform UB ADLs with SUP and LB ADLs with SBA and Good safety. Short term goal 3: Pt will actively participate in 30+ minutes of therapeutic exercise/functional activities to promote increased IND with self-care and mobility. Short term goal 4: Pt will stand for 5+ minutes with 0-1 UE support, SBA, and no LOB while engaging in functional activity of choice. Short term goal 5: Pt will perform functional mobility and transfers during self-care consistently with SBA, least restrictive mobility device, and Good safety. Long term goals  Time Frame for Long term goals : By discharge  Long term goal 1: Pt will perform BADLs with MI and Good safety. Long term goal 2: Pt will perform functional mobility and transfers during self-care with MI, least restrictive mobility device, and Good safety. Long term goal 3: Pt will stand for 10+ minutes with 0-1 UE support, MI, and no LOB while engaging in functional activity of choice. Long term goal 4: Pt will V/D Good understanding of Fall Prevention strategies for increased safety and IND with self-care and mobility. Long term goal 5: Pt will perform simple meal prep/light housekeeping with MI and Good safety.        02/15/20 0833 02/15/20 1300   OT Individual Minutes   Time In 6750 1300   Time Out [id number]   Minutes 60 35   Time Code Minutes    Timed Code Treatment Minutes 60 Minutes 35 Minutes       Electronically signed by Mikey Parker OT on 2/15/20 at 4:08 PM

## 2020-02-16 LAB
GLUCOSE BLD-MCNC: 101 MG/DL (ref 65–105)
GLUCOSE BLD-MCNC: 109 MG/DL (ref 65–105)
GLUCOSE BLD-MCNC: 121 MG/DL (ref 65–105)
GLUCOSE BLD-MCNC: 155 MG/DL (ref 65–105)
GLUCOSE BLD-MCNC: 93 MG/DL (ref 65–105)

## 2020-02-16 PROCEDURE — 97116 GAIT TRAINING THERAPY: CPT

## 2020-02-16 PROCEDURE — 1180000000 HC REHAB R&B

## 2020-02-16 PROCEDURE — 6370000000 HC RX 637 (ALT 250 FOR IP): Performed by: PHYSICAL MEDICINE & REHABILITATION

## 2020-02-16 PROCEDURE — 6370000000 HC RX 637 (ALT 250 FOR IP): Performed by: INTERNAL MEDICINE

## 2020-02-16 PROCEDURE — 97110 THERAPEUTIC EXERCISES: CPT

## 2020-02-16 PROCEDURE — 99231 SBSQ HOSP IP/OBS SF/LOW 25: CPT | Performed by: INTERNAL MEDICINE

## 2020-02-16 PROCEDURE — 99232 SBSQ HOSP IP/OBS MODERATE 35: CPT | Performed by: PHYSICAL MEDICINE & REHABILITATION

## 2020-02-16 RX ADMIN — PANTOPRAZOLE SODIUM 40 MG: 40 TABLET, DELAYED RELEASE ORAL at 14:55

## 2020-02-16 RX ADMIN — LISINOPRIL 10 MG: 10 TABLET ORAL at 10:25

## 2020-02-16 RX ADMIN — OXYCODONE HYDROCHLORIDE 10 MG: 5 TABLET ORAL at 23:00

## 2020-02-16 RX ADMIN — OXYCODONE HYDROCHLORIDE 10 MG: 5 TABLET ORAL at 01:50

## 2020-02-16 RX ADMIN — FENOFIBRATE 160 MG: 160 TABLET ORAL at 06:11

## 2020-02-16 RX ADMIN — OXYCODONE HYDROCHLORIDE 10 MG: 5 TABLET ORAL at 06:11

## 2020-02-16 RX ADMIN — OXYCODONE HYDROCHLORIDE 5 MG: 5 TABLET ORAL at 10:22

## 2020-02-16 RX ADMIN — Medication 1 TABLET: at 10:24

## 2020-02-16 RX ADMIN — ATORVASTATIN CALCIUM 40 MG: 40 TABLET, FILM COATED ORAL at 21:02

## 2020-02-16 RX ADMIN — SENNOSIDES AND DOCUSATE SODIUM 1 TABLET: 8.6; 5 TABLET ORAL at 10:23

## 2020-02-16 RX ADMIN — SENNOSIDES AND DOCUSATE SODIUM 1 TABLET: 8.6; 5 TABLET ORAL at 21:02

## 2020-02-16 RX ADMIN — PRAMIPEXOLE DIHYDROCHLORIDE 1.5 MG: 1.5 TABLET ORAL at 21:02

## 2020-02-16 RX ADMIN — OXYCODONE HYDROCHLORIDE 10 MG: 5 TABLET ORAL at 14:55

## 2020-02-16 RX ADMIN — PANTOPRAZOLE SODIUM 40 MG: 40 TABLET, DELAYED RELEASE ORAL at 06:11

## 2020-02-16 RX ADMIN — POLYETHYLENE GLYCOL 3350 17 G: 17 POWDER, FOR SOLUTION ORAL at 10:23

## 2020-02-16 RX ADMIN — FUROSEMIDE 40 MG: 40 TABLET ORAL at 10:23

## 2020-02-16 RX ADMIN — FERROUS SULFATE TAB 325 MG (65 MG ELEMENTAL FE) 325 MG: 325 (65 FE) TAB at 10:22

## 2020-02-16 RX ADMIN — CITALOPRAM HYDROBROMIDE 10 MG: 10 TABLET ORAL at 10:23

## 2020-02-16 RX ADMIN — Medication 1 TABLET: at 21:03

## 2020-02-16 RX ADMIN — LINAGLIPTIN 5 MG: 5 TABLET, FILM COATED ORAL at 10:22

## 2020-02-16 RX ADMIN — CARVEDILOL 12.5 MG: 12.5 TABLET, FILM COATED ORAL at 10:25

## 2020-02-16 ASSESSMENT — PAIN SCALES - GENERAL
PAINLEVEL_OUTOF10: 4
PAINLEVEL_OUTOF10: 8
PAINLEVEL_OUTOF10: 7
PAINLEVEL_OUTOF10: 3
PAINLEVEL_OUTOF10: 8
PAINLEVEL_OUTOF10: 8
PAINLEVEL_OUTOF10: 0
PAINLEVEL_OUTOF10: 0
PAINLEVEL_OUTOF10: 8
PAINLEVEL_OUTOF10: 8

## 2020-02-16 ASSESSMENT — PAIN DESCRIPTION - DESCRIPTORS
DESCRIPTORS: ACHING;SHOOTING

## 2020-02-16 ASSESSMENT — PAIN DESCRIPTION - FREQUENCY
FREQUENCY: INTERMITTENT

## 2020-02-16 ASSESSMENT — PAIN DESCRIPTION - LOCATION
LOCATION: BACK

## 2020-02-16 ASSESSMENT — PAIN - FUNCTIONAL ASSESSMENT: PAIN_FUNCTIONAL_ASSESSMENT: PREVENTS OR INTERFERES SOME ACTIVE ACTIVITIES AND ADLS

## 2020-02-16 ASSESSMENT — PAIN DESCRIPTION - PAIN TYPE
TYPE: SURGICAL PAIN

## 2020-02-16 ASSESSMENT — PAIN DESCRIPTION - ORIENTATION
ORIENTATION: LOWER

## 2020-02-16 ASSESSMENT — PAIN DESCRIPTION - PROGRESSION: CLINICAL_PROGRESSION: NOT CHANGED

## 2020-02-16 ASSESSMENT — PAIN SCALES - WONG BAKER: WONGBAKER_NUMERICALRESPONSE: 0

## 2020-02-16 ASSESSMENT — PAIN DESCRIPTION - ONSET: ONSET: GRADUAL

## 2020-02-16 NOTE — PROGRESS NOTES
Comment  Comments: willing to partiipate wanted to walk to therapy hurts to sit in w/c since she just got out of bed. tolerated amb. well. Pain Screening  Patient Currently in Pain: Yes  Pain Assessment  Pain Assessment: 0-10  Pain Level: 8  Stanley-Baker Pain Rating: No hurt  Patient's Stated Pain Goal: No pain  Pain Type: Surgical pain  Pain Location: Back  Pain Orientation: Lower  Pain Radiating Towards: lower back radiaates to left groin and down thigh  Pain Descriptors: Aching; Shooting  Pain Frequency: Intermittent  Pain Onset: Gradual  Clinical Progression: Not changed  Functional Pain Assessment: Prevents or interferes some active activities and ADLs  Non-Pharmaceutical Pain Intervention(s): Repositioned  Response to Pain Intervention: Patient Satisfied  Vital Signs  Patient Currently in Pain: Yes  Oxygen Therapy  O2 Device: None (Room air)  Patient Observation  Observations: Lumbar bandage       Orientation     Cognition      Objective   Bed mobility  Bridging: Stand by assistance  Scooting: Minimal assistance  Transfers  Sit to Stand: Moderate Assistance  Stand to sit: Minimal Assistance  Bed to Chair: Minimal assistance  Stand Pivot Transfers: Minimal Assistance  Ambulation  Ambulation?: Yes  Ambulation 1  Surface: level tile  Device: Rolling Walker  Assistance: Minimal assistance  Quality of Gait: Decreased endurance with some dizziness from sit to stand. Slow pace with no LOB. Cues for upright posture. Gait Deviations: Slow Alicia;Decreased step length  Distance: 150  Comments: Verbal cues for safety. Ambulation 2  Surface - 2: level tile  Device 2: Rolling Walker  Assistance 2: Minimal assistance  Quality of Gait 2: Continues to need cues for safe negotiation of RW, needs assist to veer the walker, small stpes with decreased step length on left side. Gait Deviations: Shuffles;Decreased step length; Slow Alicia  Distance: 80  Comments: continuous v/c to increase step length and look up   Ambulation 3  Surface - 3: level tile  Device 3: Rolling Walker  Assistance 3: Minimal assistance  Quality of Gait 3: slow staggered negotiation of RW  Distance: 150  Comments: step length focus  Stairs/Curb  Stairs?: Yes  Stairs  # Steps : 10  Stairs Height: 6\"(4)  Rails: Bilateral  Device: No Device  Assistance: Contact guard assistance  Comment: cues for step too formation she goes step over rediretion required  Propulsion 1  Propulsion: Manual  Level: Level Tile  Method: RUE;LUE        Other exercises  Other exercises 1: Seated bilat. LE ex 2x15 reps;  Lime green band x 20 reps. Other exercises 2: Supine gluts and quads x 20 reps. Patient uncomfortable and needed repositioned to sitting. Other exercises 3: Bed mobility working on log rolling   Other exercises 4: STS x3 (AM) x2 (PM)  Other exercises 5: side stepping at rail 10 ft x 2  Other exercises 6: HEP x 10  Other exercises 8: UBE 6 min 3/3         Comment: bradykensia all tasks              G-Code     OutComes Score                                                     AM-PAC Score             Goals  Short term goals  Time Frame for Short term goals: 1 week  Short term goal 1: Pt to demonstrate CGA/SBA bed mobility wtih good log rolling technique. Short term goal 2: Pt to demonstrate min/CGA x1 transfers with good technique. Short term goal 3: Pt to amb 76' with RW CGA. Short term goal 4: Pt to 3-5 steps, min x1 with 1-2 HR. Short term goal 5: Pt to improve standing tolerance to at least 4-5 minutes with 1 UE support, CGA/SBA. Short term goal 6: Pt to improve sitting balance (Static/dynamic) to Good with good posture. Long term goals  Time Frame for Long term goals : by D/C  Long term goal 1: Pt to progress to Mod I bed mobility. Long term goal 2: Pt to perform Mod I transfers with device. Long term goal 3: Pt to amb 150' with device, Mod I.  Long term goal 4: Pt to perform 12 stairs per home set up, 1 HR, SBA with device prn.   Long term goal 5: Pt to improve B LE strength by 1/2 MMG. Long term goal 6: Pt to progress to 2MWT with device Mod I at least 100'. Long term goal 7: Pt to improve [place] to at least 20/28 to reduce fall risk and improve static/dynamic balance.   Patient Goals   Patient goals : \"I want to be able to get in and out of bed on my own\"    Plan    Plan  Times per week: 1.5 hr/day, 5-7 days/week  Specific instructions for Next Treatment: progress gait/standing tolerance, strengthening, balance  Current Treatment Recommendations: Strengthening, Balance Training, Functional Mobility Training, Transfer Training, Endurance Training, Gait Training, Stair training, Home Exercise Program, Safety Education & Training, Patient/Caregiver Education & Training, Equipment Evaluation, Education, & procurement, Positioning  Safety Devices  Type of devices: Gait belt, All fall risk precautions in place, Call light within reach     Therapy Time   Individual Concurrent Group Co-treatment   Time In (P) 1220         Time Out (P) 1305         Minutes (P) 39                 Rachel Reece, PTA

## 2020-02-16 NOTE — PLAN OF CARE
Problem: Risk for Impaired Skin Integrity  Goal: Tissue integrity - skin and mucous membranes  Description  Structural intactness and normal physiological function of skin and  mucous membranes. Outcome: Ongoing  Note:   Skin assessment completed this shift. Nutrition and Hydration status assessed with adequate intake. Roger Score as charted. Bilateral heels remain elevated on pillows throughout the shift. Patient able to reposition self for comfort and to prevent breakdown. Patient verbalizes understanding of pressure ulcer prevention measures. Skin integrity maintained. No new skin breakdown noted. Skin to high risk pressure areas including coccyx and heels are clear. Aquacel clean, dry and intact to back incision. Problem: Falls - Risk of:  Goal: Will remain free from falls  Description  Will remain free from falls  Outcome: Ongoing  Note:   No falls or injuries sustained at this time. No attempts to get out of bed without nursing assistance. Call light within reach and pt. uses appropriately for assistance. Siderails up x 2. Nonskid footwear remains on. Bed in low and locked position. Hourly nursing rounds made. Pt. Alert and oriented, aware of limitations, and exhibits good safety judgement. Pt. uses assistive devices appropriately. Pt. understands individual fall risk factors. Pt. reoriented to surroundings and reminded to use call light with each nurse/patient interaction. Bed alarm remains engaged throughout the shift as a precaution. Problem: Pain:  Goal: Control of acute pain  Description  Control of acute pain  Outcome: Ongoing  Note:   Pain assessment completed. Pt. able to rest.  Patient medicated with [place] for pain this shift as ordered. Patient relates a tolerable level of discomfort with the current medication. Pt. Repositions per self for comfort. Nonverbal cues indicate pain relief. Pt. Rests quietly with eyes closed after pain medication administration.  Respirations easy and unlabored. Appears free from distress.

## 2020-02-16 NOTE — PROGRESS NOTES
pole)  Assistance: Minimal assistance  Quality of Gait: Decreased endurance with some dizziness from sit to stand. Slow pace with no LOB. Cues for upright posture. Gait Deviations: Slow Alicia, Decreased step length  Distance: 150  Comments: Verbal cues for safety. Surface: level tile  Ambulation 1  Surface: level tile  Device: Rolling Walker  Other Apparatus: (IV pole)  Assistance: Minimal assistance  Quality of Gait: Decreased endurance with some dizziness from sit to stand. Slow pace with no LOB. Cues for upright posture. Gait Deviations: Slow Alicia, Decreased step length  Distance: 150  Comments: Verbal cues for safety. OT:  ADL  Feeding: Setup, Increased time to complete  Grooming: Stand by assistance, Setup, Increased time to complete  UE Bathing: Stand by assistance, Setup, Verbal cueing, Increased time to complete  LE Bathing: Contact guard assistance, Setup, Verbal cueing, Increased time to complete(with use of LHS for feet)  UE Dressing: Stand by assistance, Setup, Verbal cueing, Increased time to complete  LE Dressing: Contact guard assistance, Setup, Increased time to complete(A for B PRAVEEN hose; CGA standing to pull over hips)  Toileting: Contact guard assistance, Setup, Increased time to complete  Additional Comments: Pt engaged in showering routine this date. Bandage over lumbar incision and IV on L wrist covered with plastic during shower to keep dry. Pt requires Min/Mod VCs for safety and attention to task.    Instrumental ADL's  Instrumental ADLs: Yes     Balance  Sitting Balance: Supervision  Standing Balance: Contact guard assistance   Standing Balance  Time: AM: 1-2 minutes x 4; 2-3 minutes x 1  Activity: AM: self-care  Comment: 0-1 UE support   Functional Mobility  Functional - Mobility Device: Rolling Walker  Activity: To/from bathroom  Assist Level: Minimal assistance  Functional Mobility Comments: Min VCs for safety and sequencing during mobility     Bed mobility  Bridging:

## 2020-02-16 NOTE — PLAN OF CARE
Problem: Risk for Impaired Skin Integrity  Goal: Tissue integrity - skin and mucous membranes  Description  Structural intactness and normal physiological function of skin and  mucous membranes. 2/16/2020 1813 by Silvia Rodriguez RN  Outcome: Ongoing  2/16/2020 0751 by Rusty Paniagua RN  Outcome: Ongoing  Note:   Skin assessment completed this shift. Nutrition and Hydration status assessed with adequate intake. Roger Score as charted. Bilateral heels remain elevated on pillows throughout the shift. Patient able to reposition self for comfort and to prevent breakdown. Patient verbalizes understanding of pressure ulcer prevention measures. Skin integrity maintained. No new skin breakdown noted. Skin to high risk pressure areas including coccyx and heels are clear. Aquacel clean, dry and intact to back incision. Myah / Incontinence care provided as needed throughout the shift. Aloe Vesta Moisture Barrier ointment applied to buttocks as a preventative measure. Problem: Falls - Risk of:  Goal: Will remain free from falls  Description  Will remain free from falls  2/16/2020 1813 by Silvia Rodriguez RN  Outcome: Ongoing  2/16/2020 0751 by Rusty Paniagua RN  Outcome: Ongoing  Note:   No falls or injuries sustained at this time. No attempts to get out of bed without nursing assistance. Call light within reach and pt. uses appropriately for assistance. Siderails up x 2. Nonskid footwear remains on. Bed in low and locked position. Hourly nursing rounds made. Pt. Alert and oriented, aware of limitations, and exhibits good safety judgement. Pt. uses assistive devices appropriately. Pt. understands individual fall risk factors. Pt. reoriented to surroundings and reminded to use call light with each nurse/patient interaction. Bed alarm remains engaged throughout the shift as a precaution.      Goal: Absence of physical injury  Description  Absence of physical injury  Outcome: Ongoing     Problem: Pain:  Goal: Pain level will decrease  Description  Pain level will decrease  Outcome: Ongoing  Goal: Control of acute pain  Description  Control of acute pain  2/16/2020 1813 by Boston Mares RN  Outcome: Ongoing  2/16/2020 0751 by Concetta Toure RN  Outcome: Ongoing  Note:   Pain assessment completed. Pt. able to rest.  Patient medicated with [place] for pain this shift as ordered. Patient relates a tolerable level of discomfort with the current medication. Pt. Repositions per self for comfort. Nonverbal cues indicate pain relief. Pt. Rests quietly with eyes closed after pain medication administration. Respirations easy and unlabored. Appears free from distress.      Goal: Control of chronic pain  Description  Control of chronic pain  Outcome: Ongoing     Problem: Musculor/Skeletal Functional Status  Goal: Highest potential functional level  Outcome: Ongoing  Goal: Absence of falls  Outcome: Ongoing     Problem: Musculor/Skeletal Functional Status  Goal: Highest potential functional level  Outcome: Ongoing  Goal: Absence of falls  Outcome: Ongoing     Problem: Nutrition  Goal: Optimal nutrition therapy  Outcome: Ongoing

## 2020-02-16 NOTE — PROGRESS NOTES
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, 1.5 pounds    APPENDECTOMY  1968    appendix ruptured, developed peritonitis    BUNIONECTOMY Left     along with calcium deposits removed   [address]  2005    negative    COLECTOMY  1969    12 INCHES REMOVED D/T OBSTRUCTION    COLONOSCOPY      CYST REMOVAL Right     right facial    HYSTERECTOMY  1973    taken as a result of recurring cysts    LUMBAR FUSION N/A 2/10/2020    LUMBAR L4-5 POSTERIOR  DECOMPRESSION INSTRUMENTATION FUSION WCEMENT AUGMENTATION/ performed by Jose E Solorio MD at [address]  8/14/14    FESS    OVARY REMOVAL  1970    UNILATERAL due to cyst    OVARY REMOVAL  1971    partial, due to cyst    SINUS SURGERY  2004    UPPER GASTROINTESTINAL ENDOSCOPY N/A 5/31/2019    EGD ESOPHAGOGASTRODUODENOSCOPY performed by Bunny Sanchez MD at [address] [place] [address] 8/5/2019    EGD BIOPSY performed by Santos Frye MD at [address] [place] [address] N/A 8/23/2019    EGD BIOPSY performed by Bunny Sanchez MD at [address] [place] [address] 3/5/2019    WRIST OPEN REDUCTION INTERNAL FIXATION performed by Bela Aguiar MD at [address]        Medications Prior to Admission:     Prior to Admission medications    Medication Sig Start Date End Date Taking? Authorizing Provider   oxyCODONE-acetaminophen (PERCOCET) 5-325 MG per tablet Take 1 tablet by mouth every 6 hours as needed for Pain for up to 7 days. Intended supply: 7 days.  Take lowest dose possible

## 2020-02-16 NOTE — PROGRESS NOTES
Physical Therapy  Facility/Department: [facility] ACUTE REHAB  Daily Treatment Note  NAME: Terrell Bay  : 1950  MRN: [id number]    Date of Service: 2020    Discharge Recommendations:  Home with assist PRN, Patient would benefit from continued therapy after discharge        Assessment   Body structures, Functions, Activity limitations: Decreased functional mobility ; Decreased strength;Decreased safe awareness;Decreased cognition;Decreased balance; Increased pain;Decreased posture;Decreased ADL status; Decreased endurance  Assessment: Pt most limited by pain and activity tolerance at this time. Pt is requiring assist for all functional mobility. Pt has supportive  that can assist as needed at d/c. Activity Tolerance  Activity Tolerance: Patient limited by pain  Other Comments  Comments: Chronic R DVT, L lateral rib fx 8-10(must have celeste hose on when up per order)     Patient Diagnosis(es): There were no encounter diagnoses. has a past medical history of Allergic rhinitis, cause unspecified, Back pain, Bowel obstruction (HCC), C. difficile diarrhea, CAD (coronary artery disease), Cellulitis, Cellulitis, Diverticulosis of colon (without mention of hemorrhage), GERD (gastroesophageal reflux disease), History of MI (myocardial infarction), History of ovarian cyst, History of peritonitis, HTN (hypertension), Hx of blood clots, Hyperlipidemia, Intestinal or peritoneal adhesions with obstruction (postoperative) (postinfection) ([address] Kidney infection, Lateral epicondylitis  of elbow, Muscle strain, Other abnormal glucose, PONV (postoperative nausea and vomiting), Pre-diabetes, Restless legs syndrome (RLS), TIA (transient ischemic attack), Vitamin D deficiency, and Wears glasses. has a past surgical history that includes Ovary removal (); colectomy (); Appendectomy (); Ovary removal (); Hysterectomy (); Bunionectomy (Left); sinus surgery ();  Colonoscopy; other surgical history (8/14/14); cyst removal (Right); Wrist fracture surgery (Left, 3/5/2019); Upper gastrointestinal endoscopy (N/A, 5/31/2019); Upper gastrointestinal endoscopy (N/A, 8/5/2019); Upper gastrointestinal endoscopy (N/A, 8/23/2019); Abdomen surgery (1976); Cardiac catheterization; Cardiac catheterization (2005); and lumbar fusion (N/A, 2/10/2020). Restrictions  Restrictions/Precautions  Restrictions/Precautions: Fall Risk, Surgical Protocols  Required Braces or Orthoses?: No  Implants present? : Metal implants  Position Activity Restriction  Other position/activity restrictions: up with assistance  Subjective   General  Chart Reviewed: Yes  Additional Pertinent Hx: Bennett Notice is 72 y.o.,  female, undergoing preadmission testing for Spondylolisthesis with scheduled Lumbar L4-L5 Posterior Decompression/ Fusion. Pt has lumbar degenerative disc disease. The initial symptoms started 4 years ago. 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 Pain described as constant, aching rating 5/10. Pain is aggravated with standing. Sitting relieves pain. Pt c/o of pain in the lumbar spine area, radiates to the lower limbs worse on the left side. Pt reports pain radiates down left thigh to knee and down right thigh causing tingling. Pt reports difficulty with ambulation due to back pain and \"balance issues\". Uses a cane for ambulation. Has sustained multiple falls with last fall being within last week. No redness, swelling or rashes. Pt has tried injections, physical therapy and NSAIDS. She can no longer take NSAIDS as she developed a bleeding ulcer and required a blood transfusion in August of 2018. Pt currently goes to pain management and has been on Percocet with minimal relief of back pain. Pt admitted to [address] [place] [address] 2/12/20. Family / Caregiver Present: No  Referring Practitioner: Kingston Ramírez MD  Subjective  Subjective: states pain is bad in low back this morn 8 .  just received meds for it.    General tile  Device 3: Rolling Walker  Assistance 3: Minimal assistance  Quality of Gait 3: slow staggered negotiation of RW  Distance: 150  Comments: step length focus  Stairs/Curb  Stairs?: Yes  Stairs  # Steps : 10  Stairs Height: 6\"(4)  Rails: Bilateral  Device: No Device  Assistance: Contact guard assistance  Comment: cues for step to formation instead of  step over step- rediretion required  Propulsion 1  Propulsion: Manual  Level: Level Tile  Method: RUE;LUE        Other exercises  Other exercises 1: Seated bilat. LE ex 2x15 reps;  Lime green band x 20 reps. Other exercises 2: Supine gluts and quads x 20 reps. Patient uncomfortable and needed repositioned to sitting. Other exercises 3: Bed mobility working on log rolling   Other exercises 4: STS x3 (AM) x2 (PM)  Other exercises 5: side stepping at rail 10 ft x 2  Other exercises 6: HEP x 10  Other exercises 8: UBE 6 min 3/3         Comment: Posture education/review. .noted bradykensia all tasks rigid trunk, forward head with lateralychin tilt(reviewed chin tucks ther ex and elongating posture technique)        Goals  Short term goals  Time Frame for Short term goals: 1 week  Short term goal 1: Pt to demonstrate CGA/SBA bed mobility wtih good log rolling technique. Short term goal 2: Pt to demonstrate min/CGA x1 transfers with good technique. Short term goal 3: Pt to amb 76' with RW CGA. Short term goal 4: Pt to 3-5 steps, min x1 with 1-2 HR. Short term goal 5: Pt to improve standing tolerance to at least 4-5 minutes with 1 UE support, CGA/SBA. Short term goal 6: Pt to improve sitting balance (Static/dynamic) to Good with good posture. Long term goals  Time Frame for Long term goals : by D/C  Long term goal 1: Pt to progress to Mod I bed mobility. Long term goal 2: Pt to perform Mod I transfers with device. Long term goal 3: Pt to amb 150' with device, Mod I.  Long term goal 4: Pt to perform 12 stairs per home set up, 1 HR, SBA with device prn.   Long term

## 2020-02-17 LAB
ANION GAP SERPL CALCULATED.3IONS-SCNC: 9 MMOL/L (ref 9–17)
BUN BLDV-MCNC: 33 MG/DL (ref 8–23)
BUN/CREAT BLD: ABNORMAL (ref 9–20)
CALCIUM SERPL-MCNC: 8.8 MG/DL (ref 8.6–10.4)
CHLORIDE BLD-SCNC: 98 MMOL/L (ref 98–107)
CO2: 30 MMOL/L (ref 20–31)
CREAT SERPL-MCNC: 1.31 MG/DL (ref 0.5–0.9)
GFR AFRICAN AMERICAN: 49 ML/MIN
GFR NON-AFRICAN AMERICAN: 40 ML/MIN
GFR SERPL CREATININE-BSD FRML MDRD: ABNORMAL ML/MIN/{1.73_M2}
GFR SERPL CREATININE-BSD FRML MDRD: ABNORMAL ML/MIN/{1.73_M2}
GLUCOSE BLD-MCNC: 104 MG/DL (ref 65–105)
GLUCOSE BLD-MCNC: 105 MG/DL (ref 65–105)
GLUCOSE BLD-MCNC: 115 MG/DL (ref 70–99)
GLUCOSE BLD-MCNC: 119 MG/DL (ref 65–105)
GLUCOSE BLD-MCNC: 82 MG/DL (ref 65–105)
GLUCOSE BLD-MCNC: 98 MG/DL (ref 65–105)
HCT VFR BLD CALC: 26.1 % (ref 36–46)
HEMOGLOBIN: 8.7 G/DL (ref 12–16)
MCH RBC QN AUTO: 31.1 PG (ref 26–34)
MCHC RBC AUTO-ENTMCNC: 33.5 G/DL (ref 31–37)
MCV RBC AUTO: 93 FL (ref 80–100)
NRBC AUTOMATED: ABNORMAL PER 100 WBC
PDW BLD-RTO: 14.2 % (ref 11.5–14.9)
PLATELET # BLD: 440 K/UL (ref 150–450)
PMV BLD AUTO: 7.4 FL (ref 6–12)
POTASSIUM SERPL-SCNC: 4.3 MMOL/L (ref 3.7–5.3)
RBC # BLD: 2.81 M/UL (ref 4–5.2)
SODIUM BLD-SCNC: 137 MMOL/L (ref 135–144)
WBC # BLD: 8.7 K/UL (ref 3.5–11)

## 2020-02-17 PROCEDURE — 97110 THERAPEUTIC EXERCISES: CPT

## 2020-02-17 PROCEDURE — 97129 THER IVNTJ 1ST 15 MIN: CPT

## 2020-02-17 PROCEDURE — 97530 THERAPEUTIC ACTIVITIES: CPT

## 2020-02-17 PROCEDURE — 6370000000 HC RX 637 (ALT 250 FOR IP): Performed by: PHYSICAL MEDICINE & REHABILITATION

## 2020-02-17 PROCEDURE — 99232 SBSQ HOSP IP/OBS MODERATE 35: CPT | Performed by: INTERNAL MEDICINE

## 2020-02-17 PROCEDURE — 1180000000 HC REHAB R&B

## 2020-02-17 PROCEDURE — 82947 ASSAY GLUCOSE BLOOD QUANT: CPT

## 2020-02-17 PROCEDURE — 97535 SELF CARE MNGMENT TRAINING: CPT

## 2020-02-17 PROCEDURE — 97130 THER IVNTJ EA ADDL 15 MIN: CPT

## 2020-02-17 PROCEDURE — 6370000000 HC RX 637 (ALT 250 FOR IP): Performed by: INTERNAL MEDICINE

## 2020-02-17 PROCEDURE — 36415 COLL VENOUS BLD VENIPUNCTURE: CPT

## 2020-02-17 PROCEDURE — 97116 GAIT TRAINING THERAPY: CPT

## 2020-02-17 PROCEDURE — 85027 COMPLETE CBC AUTOMATED: CPT

## 2020-02-17 PROCEDURE — 80048 BASIC METABOLIC PNL TOTAL CA: CPT

## 2020-02-17 PROCEDURE — 97150 GROUP THERAPEUTIC PROCEDURES: CPT

## 2020-02-17 PROCEDURE — 99231 SBSQ HOSP IP/OBS SF/LOW 25: CPT | Performed by: PHYSICAL MEDICINE & REHABILITATION

## 2020-02-17 RX ADMIN — OXYCODONE HYDROCHLORIDE 10 MG: 5 TABLET ORAL at 07:41

## 2020-02-17 RX ADMIN — POLYETHYLENE GLYCOL 3350 17 G: 17 POWDER, FOR SOLUTION ORAL at 07:41

## 2020-02-17 RX ADMIN — ATORVASTATIN CALCIUM 40 MG: 40 TABLET, FILM COATED ORAL at 19:46

## 2020-02-17 RX ADMIN — FENOFIBRATE 160 MG: 160 TABLET ORAL at 05:48

## 2020-02-17 RX ADMIN — PANTOPRAZOLE SODIUM 40 MG: 40 TABLET, DELAYED RELEASE ORAL at 05:48

## 2020-02-17 RX ADMIN — LINAGLIPTIN 5 MG: 5 TABLET, FILM COATED ORAL at 07:41

## 2020-02-17 RX ADMIN — CITALOPRAM HYDROBROMIDE 10 MG: 10 TABLET ORAL at 07:41

## 2020-02-17 RX ADMIN — OXYCODONE HYDROCHLORIDE 10 MG: 5 TABLET ORAL at 15:47

## 2020-02-17 RX ADMIN — FUROSEMIDE 40 MG: 40 TABLET ORAL at 07:41

## 2020-02-17 RX ADMIN — LISINOPRIL 10 MG: 10 TABLET ORAL at 10:13

## 2020-02-17 RX ADMIN — PANTOPRAZOLE SODIUM 40 MG: 40 TABLET, DELAYED RELEASE ORAL at 16:56

## 2020-02-17 RX ADMIN — PRAMIPEXOLE DIHYDROCHLORIDE 1.5 MG: 1.5 TABLET ORAL at 19:46

## 2020-02-17 RX ADMIN — SENNOSIDES AND DOCUSATE SODIUM 1 TABLET: 8.6; 5 TABLET ORAL at 07:41

## 2020-02-17 RX ADMIN — CARVEDILOL 12.5 MG: 12.5 TABLET, FILM COATED ORAL at 10:13

## 2020-02-17 RX ADMIN — OXYCODONE HYDROCHLORIDE 10 MG: 5 TABLET ORAL at 03:13

## 2020-02-17 RX ADMIN — Medication 1 TABLET: at 19:47

## 2020-02-17 RX ADMIN — Medication 1 TABLET: at 07:43

## 2020-02-17 RX ADMIN — OXYCODONE HYDROCHLORIDE 10 MG: 5 TABLET ORAL at 22:20

## 2020-02-17 RX ADMIN — SENNOSIDES AND DOCUSATE SODIUM 1 TABLET: 8.6; 5 TABLET ORAL at 19:46

## 2020-02-17 RX ADMIN — FERROUS SULFATE TAB 325 MG (65 MG ELEMENTAL FE) 325 MG: 325 (65 FE) TAB at 07:41

## 2020-02-17 ASSESSMENT — PAIN DESCRIPTION - LOCATION
LOCATION: BACK
LOCATION: BACK

## 2020-02-17 ASSESSMENT — PAIN DESCRIPTION - FREQUENCY: FREQUENCY: INTERMITTENT

## 2020-02-17 ASSESSMENT — PAIN DESCRIPTION - ORIENTATION
ORIENTATION: LOWER
ORIENTATION: LOWER

## 2020-02-17 ASSESSMENT — PAIN DESCRIPTION - PAIN TYPE
TYPE: SURGICAL PAIN
TYPE: SURGICAL PAIN

## 2020-02-17 ASSESSMENT — PAIN SCALES - GENERAL
PAINLEVEL_OUTOF10: 7
PAINLEVEL_OUTOF10: 8
PAINLEVEL_OUTOF10: 8
PAINLEVEL_OUTOF10: 7
PAINLEVEL_OUTOF10: 7
PAINLEVEL_OUTOF10: 8
PAINLEVEL_OUTOF10: 8

## 2020-02-17 ASSESSMENT — PAIN DESCRIPTION - DESCRIPTORS: DESCRIPTORS: ACHING;SHOOTING

## 2020-02-17 NOTE — PROGRESS NOTES
with minimal relief of back pain. Pt admitted to [facility] 2/12/20. Clinical Presentation: Pt alert, pleasant, agreeable to PT. Barriers to Learning: none  REQUIRES PT FOLLOW UP: Yes  Activity Tolerance  Activity Tolerance: Patient limited by pain     Patient Diagnosis(es): There were no encounter diagnoses. has a past medical history of Allergic rhinitis, cause unspecified, Back pain, Bowel obstruction (HCC), C. difficile diarrhea, CAD (coronary artery disease), Cellulitis, Cellulitis, Diverticulosis of colon (without mention of hemorrhage), GERD (gastroesophageal reflux disease), History of MI (myocardial infarction), History of ovarian cyst, History of peritonitis, HTN (hypertension), Hx of blood clots, Hyperlipidemia, Intestinal or peritoneal adhesions with obstruction (postoperative) (postinfection) ([facility] Kidney infection, Lateral epicondylitis  of elbow, Muscle strain, Other abnormal glucose, PONV (postoperative nausea and vomiting), Pre-diabetes, Restless legs syndrome (RLS), TIA (transient ischemic attack), Vitamin D deficiency, and Wears glasses. has a past surgical history that includes Ovary removal (1970); colectomy (1269); Appendectomy (1968); Ovary removal (1971); Hysterectomy (1973); Bunionectomy (Left); sinus surgery (2004); Colonoscopy; other surgical history (8/14/14); cyst removal (Right); Wrist fracture surgery (Left, 3/5/2019); Upper gastrointestinal endoscopy (N/A, 5/31/2019); Upper gastrointestinal endoscopy (N/A, 8/5/2019); Upper gastrointestinal endoscopy (N/A, 8/23/2019); Abdomen surgery (1976); Cardiac catheterization; Cardiac catheterization (2005); and lumbar fusion (N/A, 2/10/2020).     Restrictions  Restrictions/Precautions  Restrictions/Precautions: Fall Risk, Surgical Protocols  Required Braces or Orthoses?: No  Implants present? : Metal implants  Position Activity Restriction  Other position/activity restrictions: up with assistance  Subjective   General  Chart Reviewed: Yes  Additional Pertinent Hx: Jenna Lunsford is 72 y.o.,  female, undergoing preadmission testing for Spondylolisthesis with scheduled Lumbar L4-L5 Posterior Decompression/ Fusion. Pt has lumbar degenerative disc disease. The initial symptoms started 4 years ago. 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 Pain described as constant, aching rating 5/10. Pain is aggravated with standing. Sitting relieves pain. Pt c/o of pain in the lumbar spine area, radiates to the lower limbs worse on the left side. Pt reports pain radiates down left thigh to knee and down right thigh causing tingling. Pt reports difficulty with ambulation due to back pain and \"balance issues\". Uses a cane for ambulation. Has sustained multiple falls with last fall being within last week. No redness, swelling or rashes. Pt has tried injections, physical therapy and NSAIDS. She can no longer take NSAIDS as she developed a bleeding ulcer and required a blood transfusion in August of 2018. Pt currently goes to pain management and has been on Percocet with minimal relief of back pain. Pt admitted to [facility] 2/12/20. Family / Caregiver Present: No  Subjective  Subjective: In AM, patient states she just received meds for her low back pain, agreeable to PT. Patient alert in PM, no increase in pain from AM session. Pain Screening  Patient Currently in Pain: Yes  Pain Assessment  Pain Assessment: 0-10  Pain Level: 8  Pain Type: Surgical pain  Pain Location: Back  Pain Orientation: Lower  Pain Radiating Towards: Lower back pain radiates down thigh   Pain Descriptors: Aching; Shooting  Pain Frequency: Intermittent  Vital Signs  Patient Currently in Pain: Yes       Orientation  Orientation  Overall Orientation Status: Within Functional Limits  Cognition      Objective   Bed mobility  Rolling to Left: Minimal assistance  Rolling to Right: Minimal assistance  Supine to Sit: Moderate assistance  Sit to Supine:  Moderate Good with good posture. Long term goals  Time Frame for Long term goals : by D/C  Long term goal 1: Pt to progress to Mod I bed mobility. Long term goal 2: Pt to perform Mod I transfers with device. Long term goal 3: Pt to amb 150' with device, Mod I.  Long term goal 4: Pt to perform 12 stairs per home set up, 1 HR, SBA with device prn. Long term goal 5: Pt to improve B LE strength by 1/2 MMG. Long term goal 6: Pt to progress to 2MWT with device Mod I at least 100'. Long term goal 7: Pt to improve Tinetti to at least 20/28 to reduce fall risk and improve static/dynamic balance.   Patient Goals   Patient goals : \"I want to be able to get in and out of bed on my own\"    Plan    Plan  Times per week: 1.5 hr/day, 5-7 days/week  Specific instructions for Next Treatment: progress gait/standing tolerance, strengthening, balance  Current Treatment Recommendations: Strengthening, Balance Training, Functional Mobility Training, Transfer Training, Endurance Training, Gait Training, Stair training, Home Exercise Program, Safety Education & Training, Patient/Caregiver Education & Training, Equipment Evaluation, Education, & procurement, Positioning  Safety Devices  Type of devices: Gait belt, All fall risk precautions in place, Call light within reach     Therapy Time     02/17/20 1124 02/17/20 1507   PT Individual Minutes   Time In 9 [address]

## 2020-02-17 NOTE — PROGRESS NOTES
Speech Language Pathology  [facility]  Cognitive Treatment Note    Date: 2/17/2020  Patients Name: Bennett Meier  MRN: [id number]  Diagnosis: Mild cognitive impairment  Patient Active Problem List   Diagnosis Code    Other specified disorders of rotator cuff syndrome of shoulder and allied disorders M75.100    Diverticulosis of large intestine K57.30    Intestinal or peritoneal adhesions with obstruction (postoperative) (postinfection) ([address] K56.50    Restless legs syndrome (RLS) G25.81    GERD (gastroesophageal reflux disease) K21.9    Essential hypertension I10    Mixed hyperlipidemia E78.2    Other abnormal glucose R73.09    Atherosclerosis I70.90    Lateral epicondylitis M77.10    Allergic rhinitis J30.9    Vitamin D deficiency E55.9    Depression F32.9    Degenerative joint disease (DJD) of hip M16.9    Peripheral edema R60.9    Injury of foot, left C97.010F    Facial droop R29.810    CVA (cerebral vascular accident) ([facility] I63.9    BIN (acute kidney injury) ([facility] N17.9    Bradycardia R00.1    Ataxia R27.0    Aphasia R47.01    TIA (transient ischemic attack) G45.9    Need for prophylactic vaccination and inoculation against cholera alone Z23    Chest pain R07.9    Osteopenia M85.80    Lumbago M54.5    Facet arthritis of lumbar region M47.816    Meralgia paresthetica of right side G57.11    Lumbar degenerative disc disease M51.36    Spondylosis of lumbar region without myelopathy or radiculopathy M47.816    Blood poisoning [id number]    Cellulitis of left lower extremity L03. 80    Encounter for medication monitoring Z51.81    Deep vein thrombosis (DVT) of right lower extremity (HCC) I82.401    Lumbar facet arthropathy M47.816    Shortness of breath R06.02    Chronic deep vein thrombosis (DVT) of proximal vein of both lower extremities (HCC) I82.5Y3    Chronic diastolic heart failure (HCC) I50.32    Neurogenic claudication due to lumbar spinal stenosis M48.062    Sacroiliitis (HCC) M46.1    Stage 3 chronic kidney disease (HCC) N18.3    Stage 3 chronic kidney disease (HCC) N18.3    Type 2 diabetes mellitus with circulatory disorder, without long-term current use of insulin (HCC) E11.59    Chronic deep vein thrombosis (DVT) of popliteal vein of right lower extremity ([facility]) I82.531    Closed fracture of left wrist S62.102A    B12 deficiency E53.8    Iron deficiency anemia secondary to inadequate dietary iron intake D50.8    Diarrhea due to malabsorption K90.9, R19.7    Closed head injury S09.90XA    Scalp laceration S01. 01XA    Abnormal finding on imaging R93.89    Other irritable bowel syndrome K58.8    Frequent falls R29.6    Double vision H53.2    Muscle soreness M79.10    GI bleed K92.2    Peptic ulcer K27.9    Melena K92.1    PUD (peptic ulcer disease) K27.9    Absolute anemia D64.9    Acute blood loss anemia D62    Acute renal failure (HCC) N17.9    Clostridium difficile infection A49.8    Acquired spondylolisthesis M43.10    Spinal stenosis of lumbar region with neurogenic claudication M48.062    Acute deep vein thrombosis (DVT) of proximal vein of left lower extremity ([facility]) I82.4Y2    Leg swelling M79.89    Back pain M54.9    Neurological disorder G98.8       Pain: Not reported     Cognitive Treatment    Treatment time: 6855-3017      Subjective: [x] Alert [x] Cooperative     [] Confused     [] Agitated    [] Lethargic      Objective/Assessment:  Attention: Sustained throughout, distractions minimized     Recall:   Visual recall (4 unit image retention) : 75%   immediate recall, improving to 100% with mod cues                                                               75%   Delayed recall (5 mins), improving to 100% with mod cues    Working memory exercise: Mental manipulation (3 unit)    80% sadiq, improving to 93% accuracy with  Mod cues provided.    Problem Solving/Reasoning:   Deductive reasoning:  Word deduction: 90% accuracy sadiq, improving to 100% with mod verbal cues provided    Other:     No family present, pt reports feeling improved memory function from initial evaluation. Plan:  [x] Continue ST services    [] Discharge from ST:      Discharge recommendations: [] Inpatient Rehab   [] [place]   [] Outpatient Therapy  [] Follow up at trauma clinic   [] Other:       Treatment completed by:  Lenny Marsh M.A., Veda Little

## 2020-02-17 NOTE — PATIENT CARE CONFERENCE
[address] [facility]    ACUTE REHABILITATION  TEAM CONFERENCE NOTE  Date: 20  Patient Name: Bhavik Gonzalez       Room: 3179/4106-21  MRN: [id number]       : 1950  (72 y.o.)     Gender: female   Referring Practitioner: Chris Em MD   Neurological disorder [G98.8]  Diagnosis: Neurological disorder; Lumbar L4-5 PLIF Vertebroplasty L4 & L5 2/10/20     NURSING  Bladder  Continent  Bowel   Continent  Intervention    Bowel Program    Wounds/Incisions/Ulcers: Incision healing well  Medication Education Program: Patient currently unable to manage medications and family being educated  Pain: Patient's pain is currently controlled with -  roxicodone 5-10mg Q4 prn    Fall Risk:  Falling star program initiated    PHYSICAL THERAPY  Bed Mobility  Bridging: Stand by assistance  Rolling: Rolling Left;Rolling Right;Minimal assistance  Supine to Sit: Moderate assistance  Sit to Supine: Moderate assistance  Scooting: Minimal assistance  Comment: log roll for bed mobility     Transfers:  Sit to Stand: Minimal Assistance;Contact guard assistance  Stand to sit: Contact guard assistance;Minimal Assistance  Bed to Chair: Minimal assistance  Comment: Posture education/review. .noted bradykensia all tasks rigid trunk, forward head with lateralychin tilt(reviewed chin tucks ther ex and elongating posture technique)    Ambulation 1  Surface: level tile  Device: Rolling Walker  Assistance: Minimal assistance  Quality of Gait: No LOB and slow pace. VCs given for maintain upright posture. Gait Deviations: Slow Tawanda;Decreased step length;Decreased step height;Deviated path  Distance: 100 ft   Comments: VCs for safety. Ambulation 2  Surface - 2: level tile  Device 2: Rolling Walker  Assistance 2: Minimal assistance  Quality of Gait 2: Devaited gait pattern, slow tawanda with decreased step length. VCs to look up   Gait Deviations: Shuffles;Decreased step length; Slow Tawanda;Deviated path  Distance: 20  Comments: 39  Goal:  42/42         Goal:  82/90   `  Discharge Plan   Estimated Discharge Date: 2/24/2020  Overnight or Day Pass: No  Factors facilitating achievement of predicted outcomes: Motivated, Cooperative and Good insight into deficits  Barriers to the achievement of predicted outcomes: Pain, Cognitive deficit, Lower extremity weakness and Medication managment    Functional Goals at discharge:  Predicted Outcome: Home with familyPATIENT'S LEVEL OF ASSISTANCE: Occasional Supervision   Discharge therapy goals:  PT: Long term goals  Time Frame for Long term goals : by D/C  Long term goal 1: Pt to progress to Mod I bed mobility. Long term goal 2: Pt to perform Mod I transfers with device. Long term goal 3: Pt to amb 150' with device, Mod I.  Long term goal 4: Pt to perform 12 stairs per home set up, 1 HR, SBA with device prn. Long term goal 5: Pt to improve B LE strength by 1/2 MMG. Long term goal 6: Pt to progress to 2MWT with device Mod I at least 100'. Long term goal 7: Pt to improve Tinetti to at least 20/28 to reduce fall risk and improve static/dynamic balance. OT:Long term goals  Time Frame for Long term goals : By discharge  Long term goal 1: Pt will perform BADLs with MI and Good safety. Long term goal 2: Pt will perform functional mobility and transfers during self-care with MI, least restrictive mobility device, and Good safety. Long term goal 3: Pt will stand for 10+ minutes with 0-1 UE support, MI, and no LOB while engaging in functional activity of choice. Long term goal 4: Pt will V/D Good understanding of Fall Prevention strategies for increased safety and IND with self-care and mobility. Long term goal 5: Pt will perform simple meal prep/light housekeeping with MI and Good safety.   ST: Long Term Goals  Time frame for long term goals: By discharge  Long Term goal 1: Supervision for comprehension  Long Term goal 2: Supervision for expression   Long Term goal 3: Min A for problem solving   Long Term goal 4: Min A for memory     Team Members Present at Conference:  :  [address] [facility] [facility]  Occupational Therapist: Jennifer Fraga OT   Physical [address]  PT  Speech Therapist:  Arthur ROMAN  Nurse: Heidi Morales RN    Recreation Therapy: José Lara  Dietary/Nutrition: Dalila Huertas RD LD  Pastoral Care: Yenny Luevano  CMG:  Mihir Puckett RN    I approve the established interdisciplinary plan of care as documented within the medical record of Connie Burnett.     Robe Rubin MD

## 2020-02-17 NOTE — PROGRESS NOTES
[address] [facility]    ACUTE REHABILITATION OCCUPATIONAL THERAPY  DAILY NOTE    Date: 20  Patient Name: Jenna Lunsford      Room: 9197/4732-12    MRN: [id number]   : 1950  (72 y.o.)  Gender: female   Referring Practitioner: Dr. Rae/Dr. Shanita Henson  Diagnosis: Neurological disorder; Lumbar L4-5 PLIF Vertebroplasty L4 & L5 2/10/20       Restrictions  Restrictions/Precautions: Fall Risk, Surgical Protocols  Implants present? : Metal implants  Other position/activity restrictions: up with assistance  Required Braces or Orthoses?: No  Equipment Used: Other, Wheelchair, Bed    Subjective  Subjective: Pt requires cues for attention to task during therapy session   Comments: pt reports increased pain this PM   Patient Currently in Pain: Yes  Pain Level: 8  Pain Location: Back  Pain Orientation: Lower  Restrictions/Precautions: Fall Risk;Surgical Protocols  Overall Orientation Status: Within Functional Limits  Patient Observation  Observations: pleasant and cooperative,  Pain Assessment  Pain Assessment: 0-10  Pain Level: 8  Pain Type: Surgical pain  Pain Location: Back  Pain Orientation: Lower    Objective  Cognition  Overall Cognitive Status: Impaired  Arousal/Alertness: Delayed responses to stimuli  Following Directions: Follows two step commands  Attention Span: Attends with cues to redirect  Memory: Decreased short term memory;Decreased recall of recent events  Following Commands:  Follows multistep commands with repetition  Safety Judgement: Decreased awareness of need for safety  Awareness of Errors: Decreased awareness of errors  Insights: Decreased awareness of deficits    Sequencing and Organization: Assistance required to implement solutions;Assistance required to generate solutions;Assistance required to identify errors made  Balance  Sitting Balance: Supervision  Standing Balance: Contact guard assistance     Bed mobility  Supine to Sit: Minimal assistance(cues for log rolling for safety )  Sit Recommendations: Patient would benefit from continued therapy after discharge  Activity Tolerance: Patient Tolerated treatment well  Safety Devices in place: Yes          Patient Education:  Patient Goals   Patient goals : \"To be able to do all the things I need to do\"   Importance of asking for assistance and safety during transfers   Learner:patient  Method: demonstration and explanation       Outcome: acknowledged understanding         Plan  Plan  Times per week: 5-7  Times per day: Twice a day  Current Treatment Recommendations: Self-Care / ADL, Home Management Training, Strengthening, Balance Training, Functional Mobility Training, Endurance Training, Cognitive Reorientation, Pain Management, Safety Education & Training, Patient/Caregiver Education & Training, Equipment Evaluation, Education, & procurement, Cognitive/Perceptual Training  Patient Goals   Patient goals : \"To be able to do all the things I need to do\"  Short term goals  Time Frame for Short term goals: One week  Short term goal 1: Pt will V/D Good understanding of AE/DME/modified techniques for increased IND with self-care and mobility. Short term goal 2: Pt will perform UB ADLs with SUP and LB ADLs with SBA and Good safety. Short term goal 3: Pt will actively participate in 30+ minutes of therapeutic exercise/functional activities to promote increased IND with self-care and mobility. Short term goal 4: Pt will stand for 5+ minutes with 0-1 UE support, SBA, and no LOB while engaging in functional activity of choice. Short term goal 5: Pt will perform functional mobility and transfers during self-care consistently with SBA, least restrictive mobility device, and Good safety. Long term goals  Time Frame for Long term goals : By discharge  Long term goal 1: Pt will perform BADLs with MI and Good safety.   Long term goal 2: Pt will perform functional mobility and transfers during self-care with MI, least restrictive mobility device, and Good safety. Long term goal 3: Pt will stand for 10+ minutes with 0-1 UE support, MI, and no LOB while engaging in functional activity of choice. Long term goal 4: Pt will V/D Good understanding of Fall Prevention strategies for increased safety and IND with self-care and mobility. Long term goal 5: Pt will perform simple meal prep/light housekeeping with MI and Good safety.        02/17/20 0731 02/17/20 1528   OT Individual Minutes   Time In 0731 1231   Time Out 0831 1303   Minutes 60 32       Electronically signed by SHWETA Shah on 2/17/20 at 3:29 PM

## 2020-02-17 NOTE — PROGRESS NOTES
sit to stand. Slow pace with no LOB. Cues for upright posture. Gait Deviations: Slow Alicia, Decreased step length  Distance: 150  Comments: Verbal cues for safety. Surface: level tile  Ambulation 1  Surface: level tile  Device: Rolling Walker  Other Apparatus: (IV pole)  Assistance: Minimal assistance  Quality of Gait: Decreased endurance with some dizziness from sit to stand. Slow pace with no LOB. Cues for upright posture. Gait Deviations: Slow Alicia, Decreased step length  Distance: 150  Comments: Verbal cues for safety. OT:  ADL  Feeding: Setup, Increased time to complete  Grooming: Stand by assistance, Setup, Increased time to complete  UE Bathing: Stand by assistance, Setup, Verbal cueing, Increased time to complete  LE Bathing: Contact guard assistance, Setup, Verbal cueing, Increased time to complete(with use of LHS for feet)  UE Dressing: Stand by assistance, Setup, Verbal cueing, Increased time to complete  LE Dressing: Contact guard assistance, Setup, Increased time to complete(A for B PRAVEEN hose; CGA standing to pull over hips)  Toileting: Contact guard assistance, Setup, Increased time to complete  Additional Comments: Pt engaged in showering routine this date. Bandage over lumbar incision and IV on L wrist covered with plastic during shower to keep dry. Pt requires Min/Mod VCs for safety and attention to task.    Instrumental ADL's  Instrumental ADLs: Yes     Balance  Sitting Balance: Supervision  Standing Balance: Contact guard assistance   Standing Balance  Time: AM: 1-2 minutes x 4; 2-3 minutes x 1  Activity: AM: self-care  Comment: 0-1 UE support   Functional Mobility  Functional - Mobility Device: Rolling Walker  Activity: To/from bathroom  Assist Level: Minimal assistance  Functional Mobility Comments: Min VCs for safety and sequencing during mobility     Bed mobility  Bridging: Stand by assistance  Rolling to Left: Stand by assistance  Rolling to Right: Moderate assistance  Supine to Sit: Moderate assistance  Sit to Supine: Moderate assistance  Scooting: Minimal assistance  Comment: Pt seated in w/c at start and end of session  Transfers  Stand Pivot Transfers: Minimal assistance  Sit to stand: Contact guard assistance  Stand to sit: Contact guard assistance  Transfer Comments: Min VCs for hand placement with Fair return   Toilet Transfers  Toilet - Technique: Ambulating  Equipment Used: Grab bars  Toilet Transfer: Minimal assistance  Toilet Transfers Comments: w/ RW     Shower Transfers  Shower - Transfer From: Walker  Shower - Transfer Type: To and From  Shower - Transfer To: Transfer tub bench  Shower - Technique: Ambulating  Shower Transfers: Minimal assistance  Shower Transfers Comments: Min VCs for hand placement with Fair return  Wheelchair Bed Transfers  Wheelchair/Bed - Technique: Stand pivot, To right  Equipment Used: Other, Wheelchair, Bed  Level of Asssistance: Minimal assistance    SPEECH:  Subjective: [x]? Alert     [x]? Cooperative     []? Confused     []? Agitated    []? Lethargic        Objective/Assessment:  Attention: Sustained throughout, distractions minimized      Recall:   Visual recall (4 unit image retention) : 75%   immediate recall, improving to 100% with mod cues                                                               75%   Delayed recall, improving to 100% with mod cues     Working memory exercise: Mental manipulation (3 unit)    12/15 sadiq, improving to 14/15 with  Mod cues provided. Objective:  BP (!) 137/49   Pulse 65   Temp 98.5 °F (36.9 °C) (Oral)   Resp 20   Ht 5' 3\" (1.6 m)   Wt 167 lb (75.8 kg)   SpO2 97%   BMI 29.58 kg/m²       GEN: well developed, well nourished, NAD  HEENT: NCAT, PERRL, EOMI, mucous membranes pink and moist  CV: RRR, no murmurs, rubs or gallops  PULM: CTAB, no rales or rhonchi. Respirations WNL and unlabored  ABD: soft, NT, ND, BS+ and equal  NEURO: A&O x3. Sensation intact to light touch.  DTRs 2+  MSK: Functional ROM all extremities . Strength 4+/5 key muscles BUEs. Strength 4-/5 B hip flexion and knee extension. EXTREMITIES: No calf tenderness to palpation bilaterally. No edema BLEs  SKIN: warm dry and intact with good turgor except healing lumbar incision with dressing CD&I  PSYCH: appropriately interactive. Affect WNL. Diagnostics:     CBC:   Recent Labs     02/15/20  0621 02/17/20  0611   WBC 9.7 8.7   RBC 2.56* 2.81*   HGB 8.1* 8.7*   HCT 23.8* 26.1*   MCV 92.9 93.0   RDW 13.8 14.2    440     BMP:   Recent Labs     02/17/20  0611      K 4.3   CL 98   CO2 30   BUN 33*   CREATININE 1.31*   GLUCOSE 115*     BNP: No results for input(s): BNP in the last 72 hours. PT/INR: No results for input(s): PROTIME, INR in the last 72 hours. APTT: No results for input(s): APTT in the last 72 hours. CARDIAC ENZYMES: No results for input(s): CKMB, CKMBINDEX, TROPONINT in the last 72 hours. Invalid input(s): CKTOTAL;3  FASTING LIPID PANEL:  Lab Results   Component Value Date    CHOL 103 05/20/2019    HDL 48 05/20/2019    TRIG 66 05/20/2019     LIVER PROFILE: No results for input(s): AST, ALT, ALB, BILIDIR, BILITOT, ALKPHOS in the last 72 hours.      Current Medications:   Current Facility-Administered Medications: acetaminophen (TYLENOL) tablet 650 mg, 650 mg, Oral, Q4H PRN  oxyCODONE (ROXICODONE) immediate release tablet 5 mg, 5 mg, Oral, Q4H PRN **OR** oxyCODONE (ROXICODONE) immediate release tablet 10 mg, 10 mg, Oral, Q4H PRN  sodium chloride flush 0.9 % injection 10 mL, 10 mL, Intravenous, PRN  atorvastatin (LIPITOR) tablet 40 mg, 40 mg, Oral, Nightly  calcium carbonate-vitamin D (CALTRATE) 600-400 MG-UNIT per tab 1 tablet, 1 tablet, Oral, BID  carvedilol (COREG) tablet 12.5 mg, 12.5 mg, Oral, BID  citalopram (CELEXA) tablet 10 mg, 10 mg, Oral, Daily  fenofibrate tablet 160 mg, 160 mg, Oral, QAM AC  ferrous sulfate tablet 325 mg, 325 mg, Oral, Daily with breakfast  furosemide (LASIX) tablet 40 mg, 40 mg, Oral, Daily  linagliptin (TRADJENTA) tablet 5 mg, 5 mg, Oral, Daily  lisinopril (PRINIVIL;ZESTRIL) tablet 10 mg, 10 mg, Oral, Daily  magnesium hydroxide (MILK OF MAGNESIA) 400 MG/5ML suspension 30 mL, 30 mL, Oral, Daily PRN  nitroGLYCERIN (NITROSTAT) SL tablet 0.4 mg, 0.4 mg, Sublingual, Q5 Min PRN  ondansetron (ZOFRAN) tablet 4 mg, 4 mg, Oral, Daily PRN  pantoprazole (PROTONIX) tablet 40 mg, 40 mg, Oral, BID AC  pramipexole (MIRAPEX) tablet 1.5 mg, 1.5 mg, Oral, Daily  polyethylene glycol (GLYCOLAX) packet 17 g, 17 g, Oral, Daily  bisacodyl (DULCOLAX) suppository 10 mg, 10 mg, Rectal, Daily PRN  sennosides-docusate sodium (SENOKOT-S) 8.6-50 MG tablet 1 tablet, 1 tablet, Oral, BID  lidocaine (XYLOCAINE) 5 % ointment, , Topical, PRN      Impression/Plan:   Impaired ADLs, gait, and mobility due to:      1. Lumbar stenosis s/p L4-5 posterior fusion and vertebroplasty L4 and L5 on 2/10: PT/OT  for gait, mobility, strengthening, endurance, ADLs, and self care. Has oxycodone, lidocaine for pain  2. HTN/Hyperlipidemia: on Coreg, Lasix, hydralazine, lisinopril, nitro prn, lipitor  3. Depression: on Celexa  4. Blood loss anemia: stable  5. Mild leukocytosis:   6. DM: on tradjenta  7. GERD: Hx GI bleed, on protonix  8. Internal medicine for medical management  9. DVT prophylaxis/chronic R gastroc DVT:  Repeat dopplers 2/19. Consider resuming prophylaxis after ortho Dr. Chely Daniels approves. Was on Eliquis for previous DVT. Electronically signed by Heinz Najjar, MD on 2/17/2020 at 8:29 AM      This note is created with the assistance of a speech recognition program.  While intending to generate a document that actually reflects the content of the visit, the document can still have some errors including those of syntax and sound a like substitutions which may escape proof reading. In such instances, actual meaning can be extrapolated by contextual diversion.

## 2020-02-17 NOTE — PLAN OF CARE
Problem: Risk for Impaired Skin Integrity  Goal: Tissue integrity - skin and mucous membranes  Description  Structural intactness and normal physiological function of skin and  mucous membranes.   2/17/2020 0438 by Bianka Snyder RN  Outcome: Ongoing     Problem: Falls - Risk of:  Goal: Will remain free from falls  Description  Will remain free from falls  2/17/2020 0438 by Bianka Snyder RN  Outcome: Ongoing     Problem: Pain:  Goal: Pain level will decrease  Description  Pain level will decrease  2/17/2020 0438 by Bianka Snyder RN  Outcome: Ongoing     Problem: Musculor/Skeletal Functional Status  Goal: Highest potential functional level  2/17/2020 0438 by Bianka Snyder RN  Outcome: Ongoing

## 2020-02-18 LAB
GLUCOSE BLD-MCNC: 115 MG/DL (ref 65–105)
GLUCOSE BLD-MCNC: 94 MG/DL (ref 65–105)
GLUCOSE BLD-MCNC: 96 MG/DL (ref 65–105)
GLUCOSE BLD-MCNC: 99 MG/DL (ref 65–105)

## 2020-02-18 PROCEDURE — 1180000000 HC REHAB R&B

## 2020-02-18 PROCEDURE — 97530 THERAPEUTIC ACTIVITIES: CPT

## 2020-02-18 PROCEDURE — 97129 THER IVNTJ 1ST 15 MIN: CPT

## 2020-02-18 PROCEDURE — 99232 SBSQ HOSP IP/OBS MODERATE 35: CPT | Performed by: PHYSICAL MEDICINE & REHABILITATION

## 2020-02-18 PROCEDURE — 99232 SBSQ HOSP IP/OBS MODERATE 35: CPT | Performed by: INTERNAL MEDICINE

## 2020-02-18 PROCEDURE — 97110 THERAPEUTIC EXERCISES: CPT

## 2020-02-18 PROCEDURE — 6370000000 HC RX 637 (ALT 250 FOR IP): Performed by: PHYSICAL MEDICINE & REHABILITATION

## 2020-02-18 PROCEDURE — 97116 GAIT TRAINING THERAPY: CPT

## 2020-02-18 PROCEDURE — 82947 ASSAY GLUCOSE BLOOD QUANT: CPT

## 2020-02-18 PROCEDURE — 97535 SELF CARE MNGMENT TRAINING: CPT

## 2020-02-18 PROCEDURE — 6370000000 HC RX 637 (ALT 250 FOR IP): Performed by: INTERNAL MEDICINE

## 2020-02-18 PROCEDURE — 97130 THER IVNTJ EA ADDL 15 MIN: CPT

## 2020-02-18 RX ADMIN — OXYCODONE HYDROCHLORIDE 10 MG: 5 TABLET ORAL at 11:43

## 2020-02-18 RX ADMIN — ATORVASTATIN CALCIUM 40 MG: 40 TABLET, FILM COATED ORAL at 20:57

## 2020-02-18 RX ADMIN — CARVEDILOL 12.5 MG: 12.5 TABLET, FILM COATED ORAL at 11:44

## 2020-02-18 RX ADMIN — FERROUS SULFATE TAB 325 MG (65 MG ELEMENTAL FE) 325 MG: 325 (65 FE) TAB at 07:57

## 2020-02-18 RX ADMIN — OXYCODONE HYDROCHLORIDE 10 MG: 5 TABLET ORAL at 02:45

## 2020-02-18 RX ADMIN — FUROSEMIDE 40 MG: 40 TABLET ORAL at 07:57

## 2020-02-18 RX ADMIN — SENNOSIDES AND DOCUSATE SODIUM 1 TABLET: 8.6; 5 TABLET ORAL at 20:56

## 2020-02-18 RX ADMIN — SENNOSIDES AND DOCUSATE SODIUM 1 TABLET: 8.6; 5 TABLET ORAL at 07:57

## 2020-02-18 RX ADMIN — OXYCODONE HYDROCHLORIDE 10 MG: 5 TABLET ORAL at 07:13

## 2020-02-18 RX ADMIN — LINAGLIPTIN 5 MG: 5 TABLET, FILM COATED ORAL at 07:57

## 2020-02-18 RX ADMIN — PANTOPRAZOLE SODIUM 40 MG: 40 TABLET, DELAYED RELEASE ORAL at 05:40

## 2020-02-18 RX ADMIN — Medication 1 TABLET: at 20:57

## 2020-02-18 RX ADMIN — FENOFIBRATE 160 MG: 160 TABLET ORAL at 05:40

## 2020-02-18 RX ADMIN — PANTOPRAZOLE SODIUM 40 MG: 40 TABLET, DELAYED RELEASE ORAL at 16:36

## 2020-02-18 RX ADMIN — OXYCODONE HYDROCHLORIDE 10 MG: 5 TABLET ORAL at 20:56

## 2020-02-18 RX ADMIN — CARVEDILOL 12.5 MG: 12.5 TABLET, FILM COATED ORAL at 20:56

## 2020-02-18 RX ADMIN — OXYCODONE HYDROCHLORIDE 10 MG: 5 TABLET ORAL at 16:36

## 2020-02-18 RX ADMIN — PRAMIPEXOLE DIHYDROCHLORIDE 1.5 MG: 1.5 TABLET ORAL at 20:58

## 2020-02-18 RX ADMIN — POLYETHYLENE GLYCOL 3350 17 G: 17 POWDER, FOR SOLUTION ORAL at 07:57

## 2020-02-18 RX ADMIN — Medication 1 TABLET: at 07:58

## 2020-02-18 RX ADMIN — CITALOPRAM HYDROBROMIDE 10 MG: 10 TABLET ORAL at 07:57

## 2020-02-18 ASSESSMENT — PAIN SCALES - GENERAL
PAINLEVEL_OUTOF10: 7
PAINLEVEL_OUTOF10: 6
PAINLEVEL_OUTOF10: 7
PAINLEVEL_OUTOF10: 7
PAINLEVEL_OUTOF10: 8
PAINLEVEL_OUTOF10: 8
PAINLEVEL_OUTOF10: 3
PAINLEVEL_OUTOF10: 4
PAINLEVEL_OUTOF10: 6
PAINLEVEL_OUTOF10: 0

## 2020-02-18 ASSESSMENT — PAIN DESCRIPTION - PAIN TYPE: TYPE: SURGICAL PAIN

## 2020-02-18 ASSESSMENT — PAIN DESCRIPTION - ORIENTATION: ORIENTATION: LOWER

## 2020-02-18 ASSESSMENT — PAIN DESCRIPTION - LOCATION: LOCATION: BACK

## 2020-02-18 NOTE — PROGRESS NOTES
assistance  Rolling to Left: Minimal assistance  Rolling to Right: Minimal assistance  Supine to Sit: Moderate assistance  Sit to Supine: Moderate assistance  Scooting: Minimal assistance  Comment: emphasis on log roll for bed mobility   Transfers  Stand Pivot Transfers: Minimal assistance  Sit to stand: Contact guard assistance(standing from EOB, cues for safety )  Stand to sit: Contact guard assistance(verbal cues provided for safe sitting )  Transfer Comments: Min VCs for hand placement with Fair return   Toilet Transfers  Toilet - Technique: Ambulating  Equipment Used: Grab bars  Toilet Transfer: Minimal assistance  Toilet Transfers Comments: w/ RW     Shower Transfers  Shower - Transfer From: Walker  Shower - Transfer Type: To and From  Shower - Transfer To: Transfer tub bench  Shower - Technique: Ambulating  Shower Transfers: Minimal assistance  Shower Transfers Comments: Min VCs for hand placement with Fair return  Wheelchair Bed Transfers  Wheelchair/Bed - Technique: Stand pivot, To right  Equipment Used: Other, Wheelchair, Bed  Level of Asssistance: Minimal assistance    SPEECH:  Subjective: [x]? Alert     [x]? Cooperative     []? Confused     []? Agitated    []? Lethargic        Objective/Assessment:  Attention: Sustained throughout, distractions minimized      Recall:   Visual recall (4 unit image retention) : 75%   immediate recall, improving to 100% with mod cues                                                               75%   Delayed recall (5 mins), improving to 100% with mod cues     Working memory exercise: Mental manipulation (3 unit)    80% sadiq, improving to 93% accuracy with  Mod cues provided. Problem Solving/Reasoning:   Deductive reasoning:  Word deduction: 90% accuracy sadiq, improving to 100% with mod verbal cues provided     Other:      No family present, pt reports feeling improved memory function from initial evaluation.      Objective:  BP (!) 132/53   Pulse 66   Temp 98.6 °F (37 °C) chloride flush 0.9 % injection 10 mL, 10 mL, Intravenous, PRN  atorvastatin (LIPITOR) tablet 40 mg, 40 mg, Oral, Nightly  calcium carbonate-vitamin D (CALTRATE) 600-400 MG-UNIT per tab 1 tablet, 1 tablet, Oral, BID  carvedilol (COREG) tablet 12.5 mg, 12.5 mg, Oral, BID  citalopram (CELEXA) tablet 10 mg, 10 mg, Oral, Daily  fenofibrate tablet 160 mg, 160 mg, Oral, QAM AC  ferrous sulfate tablet 325 mg, 325 mg, Oral, Daily with breakfast  furosemide (LASIX) tablet 40 mg, 40 mg, Oral, Daily  linagliptin (TRADJENTA) tablet 5 mg, 5 mg, Oral, Daily  magnesium hydroxide (MILK OF MAGNESIA) 400 MG/5ML suspension 30 mL, 30 mL, Oral, Daily PRN  nitroGLYCERIN (NITROSTAT) SL tablet 0.4 mg, 0.4 mg, Sublingual, Q5 Min PRN  ondansetron (ZOFRAN) tablet 4 mg, 4 mg, Oral, Daily PRN  pantoprazole (PROTONIX) tablet 40 mg, 40 mg, Oral, BID AC  pramipexole (MIRAPEX) tablet 1.5 mg, 1.5 mg, Oral, Daily  polyethylene glycol (GLYCOLAX) packet 17 g, 17 g, Oral, Daily  bisacodyl (DULCOLAX) suppository 10 mg, 10 mg, Rectal, Daily PRN  sennosides-docusate sodium (SENOKOT-S) 8.6-50 MG tablet 1 tablet, 1 tablet, Oral, BID  lidocaine (XYLOCAINE) 5 % ointment, , Topical, PRN      Impression/Plan:   Impaired ADLs, gait, and mobility due to:      1. Lumbar stenosis s/p L4-5 posterior fusion and vertebroplasty L4 and L5 on 2/10: PT/OT  for gait, mobility, strengthening, endurance, ADLs, and self care. Has oxycodone, lidocaine for pain  2. HTN/Hyperlipidemia: on Coreg, Lasix, hydralazine,  nitro prn, lipitor. IM discontinued Lisinopril and is monitoring renal function. 3. Depression: on Celexa  4. Impaired cognition: speech therapy treating  5. Blood loss anemia: s/p IV iron post op. stable  6. Mild leukocytosis:   7. DM: on tradjenta  8. GERD: Hx GI bleed, on protonix  9. Internal medicine for medical management  10. DVT prophylaxis/chronic R gastroc DVT:  Repeat dopplers 2/19.  Consider resuming prophylaxis after ortho Dr. Bhargav aVrela approves - ortho

## 2020-02-18 NOTE — PROGRESS NOTES
Patient reports she may sleep in her recliner at home. Reports she is planning on having  come in for family training on bed mobility. Transfers:  Sit to Stand: Contact guard assistance  Stand to sit: Minimal Assistance(Very guarded with descent.)  Bed to Chair: Minimal assistance     Squat Pivot Transfers: Minimal Assistance        Ambulation 1  Surface: level tile  Device: Rolling Walker  Assistance: Contact guard assistance  Quality of Gait: No LOB and slow pace. VCs given to maintain upright posture and stay within RW. Gait Deviations: Slow Alicia;Decreased step length;Decreased step height  Distance: 150 ft  twice;  short distances within gym. Comments: VCs for safety. Stairs/Curb  Stairs?: Yes  Stairs  # Steps : 15  Stairs Height: 6\"(4\")  Rails: Bilateral  Device: No Device  Assistance: Contact guard assistance  Comment: Demonstrated safe technique. Wheelchair Activities  Wheelchair Type: Standard  Wheelchair Cushion: Pressure Relieving                BALANCE Posture: Fair  Sitting - Static: Fair;+  Sitting - Dynamic: Fair;+  Standing - Static: Fair;-  Standing - Dynamic: Fair;-    EXERCISES    Other exercises?: Yes  Other exercises 1: Seated bilat. LE ex 2x15 reps;  Lime green band x 20 reps. Other exercises 2: Seated EOM dynamic balance ex 3'   Other exercises 3: Bed mobility working on log rolling and safe technique on blue mat with wedge and 3 pillows. Other exercises 4: NuStep  x 10 mins. Other exercises 5: UBE 6 min 5/5  Other exercises 6: supine bilat.  LE ex 2x10 reps           Activity Tolerance: Patient limited by pain  PT Equipment Recommendations  Equipment Needed: No         Current Treatment Recommendations: Strengthening, Balance Training, Functional Mobility Training, Transfer Training, Endurance Training, Gait Training, Stair training, Home Exercise Program, Safety Education & Training, Patient/Caregiver Education & Training, Equipment Evaluation, Education, & procurement, Positioning    Conditions Requiring Skilled Therapeutic Intervention  Body structures, Functions, Activity limitations: Decreased functional mobility ; Decreased strength;Decreased safe awareness;Decreased cognition;Decreased balance; Increased pain;Decreased posture;Decreased ADL status; Decreased endurance  Treatment Diagnosis: Impaired functional mobility 2* PLIF L4-L5  Prognosis: Good  REQUIRES PT FOLLOW UP: Yes  Discharge Recommendations: Home with assist PRN;Patient would benefit from continued therapy after discharge    Goals  Short term goals  Time Frame for Short term goals: 1 week  Short term goal 1: Pt to demonstrate CGA/SBA bed mobility wtih good log rolling technique. Short term goal 2: Pt to demonstrate min/CGA x1 transfers with good technique. Short term goal 3: Pt to amb 76' with RW CGA. Short term goal 4: Pt to 3-5 steps, min x1 with 1-2 HR. Short term goal 5: Pt to improve standing tolerance to at least 4-5 minutes with 1 UE support, CGA/SBA. Short term goal 6: Pt to improve sitting balance (Static/dynamic) to Good with good posture. Long term goals  Time Frame for Long term goals : by D/C  Long term goal 1: Pt to progress to Mod I bed mobility. Long term goal 2: Pt to perform Mod I transfers with device. Long term goal 3: Pt to amb 150' with device, Mod I.  Long term goal 4: Pt to perform 12 stairs per home set up, 1 HR, SBA with device prn. Long term goal 5: Pt to improve B LE strength by 1/2 MMG. Long term goal 6: Pt to progress to 2MWT with device Mod I at least 100'. Long term goal 7: Pt to improve Tinetti to at least 20/28 to reduce fall risk and improve static/dynamic balance.        02/18/20 1030 02/18/20 1345   PT Individual Minutes   Time In 1045 1345   Time Out 1115 1435   Minutes 30 50   PT Concurrent Minutes   Time In 1030  --    Time Out 1045  --    Minutes 15  --        Electronically signed by Mikhail Pool PTA on 2/18/20 at 4:58 PM

## 2020-02-18 NOTE — PROGRESS NOTES
Setup;Supervision  LE Bathing: Setup;Supervision  UE Dressing: Setup;Modified independent   LE Dressing: Stand by assistance; Increased time to complete  Toileting: None          Assessment  Performance deficits / Impairments: Decreased functional mobility ; Decreased ADL status; Decreased strength;Decreased safe awareness;Decreased endurance;Decreased cognition;Decreased balance;Decreased high-level IADLs;Decreased fine motor control;Decreased coordination;Decreased posture;Decreased sensation  Prognosis: Good  Activity Tolerance: Patient Tolerated treatment well  Safety Devices in place: Yes  Type of devices: Call light within reach;Gait belt;Patient at risk for falls;Nurse notified; Left in chair;Chair alarm in place; All fall risk precautions in place          Patient Education:  Patient Goals   Patient goals : \"To be able to do all the things I need to do\"  Learner:patient  Method: explanation       Outcome: needs reinforcement   OT Education  OT Education: OT Role;Plan of Care;Transfer Training    Plan  Plan  Times per week: 5-7  Times per day: Twice a day  Current Treatment Recommendations: Self-Care / ADL, Home Management Training, Strengthening, Balance Training, Functional Mobility Training, Endurance Training, Cognitive Reorientation, Pain Management, Safety Education & Training, Patient/Caregiver Education & Training, Equipment Evaluation, Education, & procurement, Cognitive/Perceptual Training  Patient Goals   Patient goals : \"To be able to do all the things I need to do\"  Short term goals  Time Frame for Short term goals: One week  Short term goal 1: Pt will V/D Good understanding of AE/DME/modified techniques for increased IND with self-care and mobility. Short term goal 2: Pt will perform UB ADLs with SUP and LB ADLs with SBA and Good safety.   Short term goal 3: Pt will actively participate in 30+ minutes of therapeutic exercise/functional activities to promote increased IND with self-care and

## 2020-02-18 NOTE — PROGRESS NOTES
Spoke with Dr. Bhargav Varela in regards to restarting anticoagulation, which one and dosage.  Per Dr. Bhargav Varela will take a look at it today wants me to put patient on his list.

## 2020-02-18 NOTE — PROGRESS NOTES
recommendations: [] Inpatient Rehab   [] [place]   [] Outpatient Therapy  [] Follow up at trauma clinic   [] Other:       Treatment completed by:  Vivien Quiñones M.A., [id number] [facility]

## 2020-02-18 NOTE — PROGRESS NOTES
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, 1.5 pounds    APPENDECTOMY  1968    appendix ruptured, developed peritonitis    BUNIONECTOMY Left     along with calcium deposits removed   R [address]  2005    negative    COLECTOMY  1969    12 INCHES REMOVED D/T OBSTRUCTION    COLONOSCOPY      CYST REMOVAL Right     right facial    HYSTERECTOMY  1973    taken as a result of recurring cysts    LUMBAR FUSION N/A 2/10/2020    LUMBAR L4-5 POSTERIOR  DECOMPRESSION INSTRUMENTATION FUSION WCEMENT AUGMENTATION/ performed by Vesta Figueroa MD at [address]  8/14/14    FESS    OVARY REMOVAL  1970    UNILATERAL due to cyst    OVARY REMOVAL  1971    partial, due to cyst    SINUS SURGERY  2004    UPPER GASTROINTESTINAL ENDOSCOPY N/A 5/31/2019    EGD ESOPHAGOGASTRODUODENOSCOPY performed by Charlynn Dakins, MD at [facility] 8/5/2019    EGD BIOPSY performed by Vladislav De Dios MD at [facility] N/A 8/23/2019    EGD BIOPSY performed by Charlynn Dakins, MD at [address] 3/5/2019    WRIST OPEN REDUCTION INTERNAL FIXATION performed by Elizabeth Engel MD at [address]        Medications Prior to Admission:     Prior to Admission medications    Medication Sig Start Date End Date Taking? Authorizing Provider   oxyCODONE-acetaminophen (PERCOCET) 5-325 MG per tablet Take 1 tablet by mouth every 6 hours as needed for Pain for up to 7 days. Intended supply: 7 days.  Take lowest dose possible to manage pain 2/12/20 2/19/20 Yes Malick Pace MD   furosemide (LASIX) 40 MG tablet Take 1 tablet by mouth daily 1/20/20  Yes Carrie Albright MD   ferrous sulfate 325 (65 Fe) MG tablet Take 1 tablet by mouth daily (with breakfast) 1/3/20  Yes Yolette Monroy MD   VITAMIN D, ERGOCALCIFEROL, PO Take by mouth   Yes Historical Provider, MD   hydrALAZINE (APRESOLINE) 50 MG tablet Take 1 tablet by mouth 3 times daily 1/2/20  Yes Carrie Albright MD   citalopram (CELEXA) 10 MG tablet Take 1 tablet by mouth daily 1/2/20  Yes Carrie Albright MD   fenofibrate micronized (LOFIBRA) 134 MG capsule Take 1 capsule by mouth every morning (before breakfast) 11/26/19  Yes Carrie Albright MD   lisinopril (PRINIVIL;ZESTRIL) 10 MG tablet Take 1 tablet by mouth daily 11/26/19  Yes Carrie Albright MD   pramipexole (MIRAPEX) 1 MG tablet Take 1.5 tablets by mouth daily 11/26/19  Yes Carrie Albright MD   atorvastatin (LIPITOR) 80 MG tablet Take 0.5 tablets by mouth nightly 11/11/19  Yes Leon Galvez MD   pantoprazole (PROTONIX) 40 MG tablet Take 1 tablet by mouth 2 times daily (before meals) 11/11/19  Yes Leon Galvez MD   SITagliptin (JANUVIA) 100 MG tablet Take 0.5 tablets by mouth daily 10/10/19  Yes Randal Schwartz MD   carvedilol (COREG) 12.5 MG tablet Take 1 tablet by mouth 2 times daily 8/13/19  Yes MAIK Mondragon - CNP   Calcium Carbonate-Vitamin D (CALCIUM 500/D) 500-125 MG-UNIT TABS Take 1 tablet by mouth 2 times daily    Yes Historical Provider, MD   Lancets MISC 1 each by Does not apply route daily 10/10/19   Peterson Ramos MD   blood glucose monitor strips Test 2 times a day & as needed for symptoms of irregular blood glucose. 10/10/19   Peterson Ramos MD   ondansetron (ZOFRAN) 4 MG tablet Take 1 tablet by mouth daily as needed for Nausea or Vomiting 8/19/19   Peterson Ramos MD   lidocaine (XYLOCAINE) 5 % ointment 4-5 times a day to your right thigh for pain.  3/15/19   Sue Vazquez MD   nitroGLYCERIN (NITROSTAT) 0.4 MG SL tablet Place 1 tablet under the tongue every 5 minutes as needed for Chest pain 18   Fritz Sheridan MD        Allergies:     Mobic [meloxicam]; Bactrim [sulfamethoxazole-trimethoprim]; Codeine; and Seasonal    Social History:     Tobacco:    reports that she quit smoking about 2 years ago. Her smoking use included cigarettes. She started smoking about 24 years ago. She has a 10.00 pack-year smoking history. She has never used smokeless tobacco.  Alcohol:      reports no history of alcohol use. Drug Use:  reports no history of drug use. Family History:     Family History   Problem Relation Age of Onset    Stroke Mother     Diabetes Mother     Heart Disease Mother     High Blood Pressure Mother     Heart Disease Father     Heart Disease Brother     High Blood Pressure Brother     Heart Disease Maternal Grandmother     High Blood Pressure Sister        Review of Systems:     Positive and Negative as described in HPI. Constitutional: Negative for chills, fatigue, fever and unexpected weight change. HENT: Negative for ear discharge, facial swelling, nosebleeds, sore throat, trouble swallowing and voice change. Eyes: Negative for redness and visual disturbance. Respiratory: Negative for cough, shortness of breath and wheezing. Cardiovascular: Negative for chest pain, palpitations and leg swelling. Gastrointestinal: Negative for abdominal pain, blood in stool, diarrhea and vomiting. Genitourinary: Negative for difficulty urinating, dysuria and flank pain. Musculoskeletal: Back pain and leg weakness  Skin: Negative for color change and rash. Neurological: Negative for dizziness, seizures, syncope and headaches. Hematological: Negative for adenopathy. Does not bruise/bleed easily.      Physical Exam:     BP (!) 128/44   Pulse 66   Temp 98.7 °F (37.1 °C) (Oral)   Resp 20   Ht 5' 3\" (1.6 m)   Wt 167 lb (75.8 kg)   SpO2 94%   BMI 29.58 kg/m²   Temp (24hrs), Av.6 °F well controlled. 3. Hyperlipidemia-continue Lipitor. 4. Acute blood loss anemia after surgery, completed 2 doses of IV iron, hemoglobin stable. 5. Chronic DVT of right leg- DVT prophylaxis to be started on 2/17 per surgical recs. mitesh cr is up to 1.3  6. Stop lisinpirl  7. Recheck bmp in a day  8. Consultations:   IP CONSULT TO INTERNAL MEDICINE  IP CONSULT TO SOCIAL WORK  IP CONSULT TO INTERNAL MEDICINE  IP CONSULT TO DIETITIAN  IP CONSULT TO SOCIAL WORK  IP CONSULT TO [facility] [address], MD  2/17/2020  10:22 PM    Copy sent to Dr. Taryn Saul MD    Please note that this chart was generated using voice recognition Dragon dictation software. Although every effort was made to ensure the accuracy of this automated transcription, some errors in transcription may have occurred.

## 2020-02-18 NOTE — PLAN OF CARE
Problem: Risk for Impaired Skin Integrity  Goal: Tissue integrity - skin and mucous membranes  Description  Structural intactness and normal physiological function of skin and  mucous membranes.   2/18/2020 0829 by Maya Francis RN  Outcome: Ongoing  2/18/2020 0418 by Elidia Petty RN  Outcome: Ongoing     Problem: Falls - Risk of:  Goal: Will remain free from falls  Description  Will remain free from falls  2/18/2020 0829 by Maya Francis RN  Outcome: Ongoing  2/18/2020 0418 by Elidia Petty RN  Outcome: Ongoing  Goal: Absence of physical injury  Description  Absence of physical injury  Outcome: Ongoing     Problem: Pain:  Goal: Pain level will decrease  Description  Pain level will decrease  2/18/2020 0829 by Maya Francis RN  Outcome: Ongoing  2/18/2020 0418 by Elidia Petty RN  Outcome: Ongoing  Goal: Control of acute pain  Description  Control of acute pain  Outcome: Ongoing  Goal: Control of chronic pain  Description  Control of chronic pain  Outcome: Ongoing     Problem: Musculor/Skeletal Functional Status  Goal: Highest potential functional level  2/18/2020 0829 by Maya Francis RN  Outcome: Ongoing  2/18/2020 0418 by Elidia Petty RN  Outcome: Ongoing  Goal: Absence of falls  Outcome: Ongoing     Problem: Musculor/Skeletal Functional Status  Goal: Highest potential functional level  Outcome: Ongoing  Goal: Absence of falls  Outcome: Ongoing     Problem: Nutrition  Goal: Optimal nutrition therapy  Outcome: Ongoing

## 2020-02-19 LAB
ANION GAP SERPL CALCULATED.3IONS-SCNC: 12 MMOL/L (ref 9–17)
BUN BLDV-MCNC: 43 MG/DL (ref 8–23)
BUN/CREAT BLD: ABNORMAL (ref 9–20)
CALCIUM SERPL-MCNC: 9 MG/DL (ref 8.6–10.4)
CHLORIDE BLD-SCNC: 96 MMOL/L (ref 98–107)
CO2: 29 MMOL/L (ref 20–31)
CREAT SERPL-MCNC: 1.82 MG/DL (ref 0.5–0.9)
GFR AFRICAN AMERICAN: 33 ML/MIN
GFR NON-AFRICAN AMERICAN: 28 ML/MIN
GFR SERPL CREATININE-BSD FRML MDRD: ABNORMAL ML/MIN/{1.73_M2}
GFR SERPL CREATININE-BSD FRML MDRD: ABNORMAL ML/MIN/{1.73_M2}
GLUCOSE BLD-MCNC: 101 MG/DL (ref 70–99)
GLUCOSE BLD-MCNC: 103 MG/DL (ref 65–105)
GLUCOSE BLD-MCNC: 113 MG/DL (ref 65–105)
GLUCOSE BLD-MCNC: 116 MG/DL (ref 65–105)
GLUCOSE BLD-MCNC: 99 MG/DL (ref 65–105)
POTASSIUM SERPL-SCNC: 4.9 MMOL/L (ref 3.7–5.3)
SODIUM BLD-SCNC: 137 MMOL/L (ref 135–144)

## 2020-02-19 PROCEDURE — 97530 THERAPEUTIC ACTIVITIES: CPT

## 2020-02-19 PROCEDURE — 97129 THER IVNTJ 1ST 15 MIN: CPT

## 2020-02-19 PROCEDURE — 93970 EXTREMITY STUDY: CPT

## 2020-02-19 PROCEDURE — 97110 THERAPEUTIC EXERCISES: CPT

## 2020-02-19 PROCEDURE — 80048 BASIC METABOLIC PNL TOTAL CA: CPT

## 2020-02-19 PROCEDURE — 36415 COLL VENOUS BLD VENIPUNCTURE: CPT

## 2020-02-19 PROCEDURE — 97116 GAIT TRAINING THERAPY: CPT

## 2020-02-19 PROCEDURE — 82947 ASSAY GLUCOSE BLOOD QUANT: CPT

## 2020-02-19 PROCEDURE — 99231 SBSQ HOSP IP/OBS SF/LOW 25: CPT | Performed by: PHYSICAL MEDICINE & REHABILITATION

## 2020-02-19 PROCEDURE — 97130 THER IVNTJ EA ADDL 15 MIN: CPT

## 2020-02-19 PROCEDURE — 97535 SELF CARE MNGMENT TRAINING: CPT

## 2020-02-19 PROCEDURE — 6370000000 HC RX 637 (ALT 250 FOR IP): Performed by: INTERNAL MEDICINE

## 2020-02-19 PROCEDURE — 99232 SBSQ HOSP IP/OBS MODERATE 35: CPT | Performed by: INTERNAL MEDICINE

## 2020-02-19 PROCEDURE — 6370000000 HC RX 637 (ALT 250 FOR IP): Performed by: ORTHOPAEDIC SURGERY

## 2020-02-19 PROCEDURE — 1180000000 HC REHAB R&B

## 2020-02-19 PROCEDURE — 6370000000 HC RX 637 (ALT 250 FOR IP): Performed by: PHYSICAL MEDICINE & REHABILITATION

## 2020-02-19 RX ADMIN — FENOFIBRATE 160 MG: 160 TABLET ORAL at 05:05

## 2020-02-19 RX ADMIN — RIVAROXABAN 10 MG: 10 TABLET, FILM COATED ORAL at 19:16

## 2020-02-19 RX ADMIN — OXYCODONE HYDROCHLORIDE 10 MG: 5 TABLET ORAL at 19:16

## 2020-02-19 RX ADMIN — ACETAMINOPHEN 650 MG: 325 TABLET, FILM COATED ORAL at 01:51

## 2020-02-19 RX ADMIN — ATORVASTATIN CALCIUM 40 MG: 40 TABLET, FILM COATED ORAL at 20:43

## 2020-02-19 RX ADMIN — FUROSEMIDE 40 MG: 40 TABLET ORAL at 08:47

## 2020-02-19 RX ADMIN — OXYCODONE HYDROCHLORIDE 10 MG: 5 TABLET ORAL at 14:57

## 2020-02-19 RX ADMIN — POLYETHYLENE GLYCOL 3350 17 G: 17 POWDER, FOR SOLUTION ORAL at 09:21

## 2020-02-19 RX ADMIN — FERROUS SULFATE TAB 325 MG (65 MG ELEMENTAL FE) 325 MG: 325 (65 FE) TAB at 08:47

## 2020-02-19 RX ADMIN — SENNOSIDES AND DOCUSATE SODIUM 1 TABLET: 8.6; 5 TABLET ORAL at 20:44

## 2020-02-19 RX ADMIN — CITALOPRAM HYDROBROMIDE 10 MG: 10 TABLET ORAL at 08:47

## 2020-02-19 RX ADMIN — MAGNESIUM HYDROXIDE 30 ML: 400 SUSPENSION ORAL at 15:06

## 2020-02-19 RX ADMIN — OXYCODONE HYDROCHLORIDE 10 MG: 5 TABLET ORAL at 09:19

## 2020-02-19 RX ADMIN — Medication 1 TABLET: at 08:49

## 2020-02-19 RX ADMIN — PRAMIPEXOLE DIHYDROCHLORIDE 1.5 MG: 1.5 TABLET ORAL at 20:45

## 2020-02-19 RX ADMIN — CARVEDILOL 12.5 MG: 12.5 TABLET, FILM COATED ORAL at 20:43

## 2020-02-19 RX ADMIN — OXYCODONE HYDROCHLORIDE 10 MG: 5 TABLET ORAL at 01:51

## 2020-02-19 RX ADMIN — SENNOSIDES AND DOCUSATE SODIUM 1 TABLET: 8.6; 5 TABLET ORAL at 08:46

## 2020-02-19 RX ADMIN — PANTOPRAZOLE SODIUM 40 MG: 40 TABLET, DELAYED RELEASE ORAL at 16:33

## 2020-02-19 RX ADMIN — Medication 1 TABLET: at 20:45

## 2020-02-19 RX ADMIN — LINAGLIPTIN 5 MG: 5 TABLET, FILM COATED ORAL at 08:47

## 2020-02-19 RX ADMIN — PANTOPRAZOLE SODIUM 40 MG: 40 TABLET, DELAYED RELEASE ORAL at 05:06

## 2020-02-19 RX ADMIN — CARVEDILOL 12.5 MG: 12.5 TABLET, FILM COATED ORAL at 08:47

## 2020-02-19 ASSESSMENT — PAIN SCALES - GENERAL
PAINLEVEL_OUTOF10: 6
PAINLEVEL_OUTOF10: 7
PAINLEVEL_OUTOF10: 8
PAINLEVEL_OUTOF10: 0
PAINLEVEL_OUTOF10: 4
PAINLEVEL_OUTOF10: 0
PAINLEVEL_OUTOF10: 4

## 2020-02-19 ASSESSMENT — PAIN DESCRIPTION - ORIENTATION
ORIENTATION: LOWER

## 2020-02-19 ASSESSMENT — PAIN DESCRIPTION - ONSET
ONSET: ON-GOING

## 2020-02-19 ASSESSMENT — PAIN DESCRIPTION - FREQUENCY
FREQUENCY: CONTINUOUS

## 2020-02-19 ASSESSMENT — PAIN DESCRIPTION - LOCATION
LOCATION: BACK

## 2020-02-19 ASSESSMENT — PAIN DESCRIPTION - PAIN TYPE
TYPE: CHRONIC PAIN;SURGICAL PAIN
TYPE: CHRONIC PAIN;SURGICAL PAIN
TYPE: CHRONIC PAIN
TYPE: CHRONIC PAIN;SURGICAL PAIN

## 2020-02-19 ASSESSMENT — PAIN DESCRIPTION - DIRECTION
RADIATING_TOWARDS: TAILBONE
RADIATING_TOWARDS: TAILBONE

## 2020-02-19 ASSESSMENT — PAIN DESCRIPTION - DESCRIPTORS
DESCRIPTORS: ACHING;DISCOMFORT
DESCRIPTORS: ACHING;DISCOMFORT;SHOOTING
DESCRIPTORS: ACHING
DESCRIPTORS: ACHING

## 2020-02-19 ASSESSMENT — PAIN DESCRIPTION - PROGRESSION: CLINICAL_PROGRESSION: NOT CHANGED

## 2020-02-19 NOTE — PROGRESS NOTES
assistance  Rolling to Left: Minimal assistance  Rolling to Right: Minimal assistance  Supine to Sit: Moderate assistance  Sit to Supine: Moderate assistance  Scooting: Minimal assistance  Comment: emphasis on log roll for bed mobility   Transfers  Stand Step Transfers: Supervision  Stand Pivot Transfers: Minimal assistance  Sit to stand: Supervision  Stand to sit: Supervision  Transfer Comments: Min VCs for hand placement with Fair return   Toilet Transfers  Toilet - Technique: Ambulating  Equipment Used: Grab bars  Toilet Transfer: Minimal assistance  Toilet Transfers Comments: w/ RW     Shower Transfers  Shower - Transfer From: Fely Flock - Transfer Type: To and From  Shower - Transfer To: Transfer tub bench  Shower - Technique: Ambulating  Shower Transfers: Minimal assistance  Shower Transfers Comments: Min VCs for hand placement with Fair return  Wheelchair Bed Transfers  Wheelchair/Bed - Technique: Stand pivot, To right  Equipment Used: Other, Wheelchair, Bed  Level of Asssistance: Minimal assistance    SPEECH:  Subjective: [x]? Alert     [x]? Cooperative     []? Confused     []? Agitated    []? Lethargic        Objective/Assessment:  Attention: Sustained throughout, distractions minimized      Recall:   Paragraph retention (presented orally) : 50%   immediate recall, improving to 100% with mod cues     Delayed recall: 24 hours 25% accuracy   Problem Solving/Reasoning:   Functional problem solving (household tasks, missing equipment) 100%      Reasoning (divergent thinking):  Multiple definitions- 100% sadiq      Other:      No family present during session     Objective:  BP (!) 118/47   Pulse 58   Temp 97.9 °F (36.6 °C) (Oral)   Resp 18   Ht 5' 3\" (1.6 m)   Wt 167 lb (75.8 kg)   SpO2 94%   BMI 29.58 kg/m²       GEN: well developed, well nourished, NAD  HEENT: NCAT, PERRL, EOMI, mucous membranes pink and moist  CV: RRR, no murmurs, rubs or gallops  PULM: CTAB, no rales or rhonchi.  Respirations WNL and unlabored  ABD: soft, NT, ND, BS+ and equal  NEURO: A&O x3. Sensation intact to light touch. MSK: Functional ROM all extremities. Strength 4+/5 key muscles BUEs. Strength 4/5 key muscles RLE on hip flexion, knee extension, plantar and dorsiflexion. Strength 4-/5 key muscles LLE on hip flexion, knee extension, plantar and dorsiflexion. EXTREMITIES: No calf tenderness to palpation bilaterally. No edema BLEs  SKIN: warm dry and intact with good turgor except healing lumbar incision with dressing CD&I  PSYCH: appropriately interactive. Affect WNL. Diagnostics:     CBC:   Recent Labs     02/17/20  0611   WBC 8.7   RBC 2.81*   HGB 8.7*   HCT 26.1*   MCV 93.0   RDW 14.2        BMP:   Recent Labs     02/17/20  0611 02/19/20  0657    137   K 4.3 4.9   CL 98 96*   CO2 30 29   BUN 33* 43*   CREATININE 1.31* 1.82*   GLUCOSE 115* 101*     BNP: No results for input(s): BNP in the last 72 hours. PT/INR: No results for input(s): PROTIME, INR in the last 72 hours. APTT: No results for input(s): APTT in the last 72 hours. CARDIAC ENZYMES: No results for input(s): CKMB, CKMBINDEX, TROPONINT in the last 72 hours. Invalid input(s): CKTOTAL;3  FASTING LIPID PANEL:  Lab Results   Component Value Date    CHOL 103 05/20/2019    HDL 48 05/20/2019    TRIG 66 05/20/2019     LIVER PROFILE: No results for input(s): AST, ALT, ALB, BILIDIR, BILITOT, ALKPHOS in the last 72 hours.      Current Medications:   Current Facility-Administered Medications: rivaroxaban (XARELTO) tablet 10 mg, 10 mg, Oral, Daily  acetaminophen (TYLENOL) tablet 650 mg, 650 mg, Oral, Q4H PRN  oxyCODONE (ROXICODONE) immediate release tablet 5 mg, 5 mg, Oral, Q4H PRN **OR** oxyCODONE (ROXICODONE) immediate release tablet 10 mg, 10 mg, Oral, Q4H PRN  sodium chloride flush 0.9 % injection 10 mL, 10 mL, Intravenous, PRN  atorvastatin (LIPITOR) tablet 40 mg, 40 mg, Oral, Nightly  calcium carbonate-vitamin D (CALTRATE) 600-400 MG-UNIT per tab 1 tablet, 1 tablet, Oral, BID  carvedilol (COREG) tablet 12.5 mg, 12.5 mg, Oral, BID  citalopram (CELEXA) tablet 10 mg, 10 mg, Oral, Daily  fenofibrate tablet 160 mg, 160 mg, Oral, QAM AC  ferrous sulfate tablet 325 mg, 325 mg, Oral, Daily with breakfast  furosemide (LASIX) tablet 40 mg, 40 mg, Oral, Daily  linagliptin (TRADJENTA) tablet 5 mg, 5 mg, Oral, Daily  magnesium hydroxide (MILK OF MAGNESIA) 400 MG/5ML suspension 30 mL, 30 mL, Oral, Daily PRN  nitroGLYCERIN (NITROSTAT) SL tablet 0.4 mg, 0.4 mg, Sublingual, Q5 Min PRN  ondansetron (ZOFRAN) tablet 4 mg, 4 mg, Oral, Daily PRN  pantoprazole (PROTONIX) tablet 40 mg, 40 mg, Oral, BID AC  pramipexole (MIRAPEX) tablet 1.5 mg, 1.5 mg, Oral, Daily  polyethylene glycol (GLYCOLAX) packet 17 g, 17 g, Oral, Daily  bisacodyl (DULCOLAX) suppository 10 mg, 10 mg, Rectal, Daily PRN  sennosides-docusate sodium (SENOKOT-S) 8.6-50 MG tablet 1 tablet, 1 tablet, Oral, BID  lidocaine (XYLOCAINE) 5 % ointment, , Topical, PRN      Impression/Plan:   Impaired ADLs, gait, and mobility due to:    1. Lumbar stenosis s/p L4-5 posterior fusion and vertebroplasty L4 and L5 on 2/10: PT/OT  for gait, mobility, strengthening, endurance, ADLs, and self care. Has oxycodone, lidocaine for pain  2. HTN/Hyperlipidemia: on Coreg, Lasix, hydralazine,  nitro prn, lipitor. IM discontinued Lisinopril and is monitoring renal function. Creatinine elevated today. 3. Depression: on Celexa  4. Impaired cognition: speech therapy treating  5. Blood loss anemia: s/p IV iron post op. stable  6. Mild leukocytosis: Resolved. WBC WNL  7. DM: on tradjenta  8. GERD: Hx GI bleed, on protonix  9. Internal medicine for medical management  10. DVT prophylaxis/chronic R gastroc DVT:  Repeating dopplers 2/19. Started on Xarelto yesterday as per Dr. Heriberto Crocker.     Electronically signed by Dieudonne Larry MD on 2/19/2020 at 8:37 AM      This note is created with the assistance of a speech recognition program.  While intending to

## 2020-02-19 NOTE — PROGRESS NOTES
Fusion. Pt has lumbar degenerative disc disease. The initial symptoms started 4 years ago. 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 Pain described as constant, aching rating 5/10. Pain is aggravated with standing. Sitting relieves pain. Pt c/o of pain in the lumbar spine area, radiates to the lower limbs worse on the left side. Pt reports pain radiates down left thigh to knee and down right thigh causing tingling. Pt reports difficulty with ambulation due to back pain and \"balance issues\". Uses a cane for ambulation. Has sustained multiple falls with last fall being within last week. No redness, swelling or rashes. Pt has tried injections, physical therapy and NSAIDS. She can no longer take NSAIDS as she developed a bleeding ulcer and required a blood transfusion in August of 2018. Pt currently goes to pain management and has been on Percocet with minimal relief of back pain. Pt admitted to [facility] 2/12/20. Overall Orientation Status: Within Normal Limits  Restrictions/Precautions  Restrictions/Precautions: Fall Risk;Surgical Protocols  Required Braces or Orthoses?: No  Implants present? : Metal implants  Position Activity Restriction  Other position/activity restrictions: up with assistance    Subjective: Pt reports back pain with sit to stands, tailbone and at incision site. Comments: Pt, SO report he will be available for family training 2/20/20, are aware of therapy times. Pt denies need for socks in afternoon despite order for PRAVEEN hose. Nursing reports pt had Doppler before p.m. PT, did not defer tx at that time.      Vital Signs  Patient Currently in Pain: Yes  Pain Assessment: 0-10  Pain Level: 6  Pain Type: Chronic pain;Surgical pain  Pain Location: Back  Pain Orientation: Lower  Pain Radiating Towards: tailbone  Pain Descriptors: Aching  Pain Frequency: Continuous  Pain Onset: On-going  Non-Pharmaceutical Pain Intervention(s): Repositioned;Rest;Ambulation/Increased Activity  Response to Pain Intervention: Patient Satisfied    Bed Mobility  Rolling: Stand by assistance;Rolling Right;Rolling Left(4 pillows, mat)  Supine to Sit: Contact guard assistance(Pt denies ability to lift trunk without A, pulls on PTA; education on positioning to reduce need for A. )  Sit to Supine: Moderate assistance(Pt reports inability to lift LEs without A at end of therapy day to return to bed (elevated to replicate home environment, per SO), no recall of sequencing for aligned position in bed from bed mobility education on gym mat. )  Scooting: Maximal assistance(pt denies ability to scoot HOB perform without Max x2 (bed flat))  Comment: HOB >45*, rail, 2 pillows    Transfers:  Sit to Stand: Contact guard assistance(RW)  Stand to sit: Contact guard assistance(Guarded/slow with descent. RW)  Bed to Chair: Contact guard assistance(RW)  Stand pivot transfers: CGA (no device, W/C to bed)     Ambulation 1  Surface: level tile  Device: Rolling Walker  Assistance: Stand by assistance  Quality of Gait: No LOB; slow pace. VCs given to maintain upright posture/head up and stay within RW. Varied step length; pt c/o increased tailbone pain with lengthy stride  Gait Deviations: Slow Alicia;Decreased step length;Decreased step height  Distance: 180' a.m. Comments: VCs for safety. Stairs/Curb  Stairs?: Yes  Stairs  # Steps : 13  Stairs Height: (4\" & 6\")  Rails: Bilateral  Device: No Device  Assistance: Contact guard assistance  Comment: Demonstrated safe technique.       Propulsion 1  Propulsion: Manual  Level: Level Tile  Method: RUE;LUE  Level of Assistance: Contact guard assistance  Description/ Details: Straight path, narrow path with VC for hand safety, backing and 90* turns  Distance: 30' & 20'    BALANCE Posture: Fair  Sitting - Static: Fair;+(EOB, UE support, no back support)  Sitting - Dynamic: Fair;+(EOB, UE support, no back support)  Standing - Static: Fair;-(RW)  Standing - Dynamic: Fair;-(RW)    EXERCISES    Other balance (Static/dynamic) to Good with good posture. Long term goals  Time Frame for Long term goals : by D/C  Long term goal 1: Pt to progress to Mod I bed mobility. Long term goal 2: Pt to perform Mod I transfers with device. Long term goal 3: Pt to amb 150' with device, Mod I.  Long term goal 4: Pt to perform 12 stairs per home set up, 1 HR, SBA with device prn. Long term goal 5: Pt to improve B LE strength by 1/2 MMG. Long term goal 6: Pt to progress to 2MWT with device Mod I at least 100'. Long term goal 7: Pt to improve Tinetti to at least 20/28 to reduce fall risk and improve static/dynamic balance.      02/19/20 1053 02/19/20 1420   PT Individual Minutes   Time In 1033 1410   Time Out 1118 1440   Minutes 45 30   PT Concurrent Minutes   Time In  --  1440   Time Out  --  1500   Minutes  --  20     Electronically signed by Hermilo Singh PTA on 2/19/20 at 4:30 PM

## 2020-02-19 NOTE — PROGRESS NOTES
Nutrition Assessment    Type and Reason for Visit: Reassess    Nutrition Recommendations: Continue current diet. Nutrition Assessment: Pt appears stable from a nutritional perspective with adequate oral intake. Malnutrition Assessment:  · Malnutrition Status: No malnutrition  · Context: Acute illness or injury  · Findings of the 6 clinical characteristics of malnutrition (Minimum of 2 out of 6 clinical characteristics is required to make the diagnosis of moderate or severe Protein Calorie Malnutrition based on AND/ASPEN Guidelines):  1. Energy Intake-Greater than 75% of estimated energy requirement(some variance), Greater than or equal to 7 days    2. Weight Loss-No significant weight loss,    3. Fat Loss-No significant subcutaneous fat loss,    4. Muscle Loss-No significant muscle mass loss,    5. Fluid Accumulation-No significant fluid accumulation, Extremities  6.  Strength-Not measured    Nutrition Risk Level:  Moderate    Nutrient Needs:  · Estimated Daily Total Kcal: 8588-3090 based on [place] with 1.2-1.3 factor  · Estimated Daily Protein (g): 1.4-1.6 gm per kg Ideal Body Wt    Nutrition Diagnosis:   · Problem: Increased nutrient needs  · Etiology: related to (healing)     Signs and symptoms:  as evidenced by Presence of wounds    Objective Information:  · Nutrition-Focused Physical Findings: No edema  · Wound Type: Surgical Wound  · Current Nutrition Therapies:  · Oral Diet Orders: General   · Oral Diet intake: %  · Anthropometric Measures:  · Ht: 5' 3\" (160 cm)   · Current Body Wt: 167 lb 1.7 oz (75.8 kg)  · Admission Body Wt: 168 lb 3.4 oz (76.3 kg)  · Usual Body Wt: 168 lb 3.4 oz (76.3 kg)  · Ideal Body Wt: 115 lb (52.2 kg), % Ideal Body 146% based on admission wt  · BMI Classification: BMI 25.0 - 29.9 Overweight    Nutrition Interventions:   Continue current diet  Continued Inpatient Monitoring    Nutrition Evaluation:   · Evaluation: Progressing toward goals   · Goals: PO intake % of most meals    · Monitoring: Meal Intake, Wound Healing, Pertinent Labs, Weight, Monitor Bowel Function    Colleen Contreras R.D., L.D.   Phone: [phone]

## 2020-02-19 NOTE — PLAN OF CARE
Problem: Risk for Impaired Skin Integrity  Goal: Tissue integrity - skin and mucous membranes  Description  Structural intactness and normal physiological function of skin and  mucous membranes. Outcome: Ongoing   Zero obvious new skin issues so far this shift. Safety maintained this shift. Will continue to monitor. Problem: Falls - Risk of:  Goal: Will remain free from falls  Description  Will remain free from falls  Outcome: Ongoing   Patient remains free from falls/injuries at this time. Call light within reach. Safety maintained this shift. Will continue to monitor. Problem: Pain:  Goal: Pain level will decrease  Description  Pain level will decrease  Outcome: Ongoing   Pain controlled with around the clock and prn pain medications so far this shift. Will continue to monitor. Safety maintained this shift.

## 2020-02-19 NOTE — PLAN OF CARE
Problem: Falls - Risk of:  Goal: Will remain free from falls  Description  Will remain free from falls  Outcome: Met This Shift  Note:   Patient continues to use call light efficiently to avoid falls/injury     Problem: Falls - Risk of:  Goal: Absence of physical injury  Description  Absence of physical injury  Outcome: Met This Shift  Note:   Patient continues to express needs thoroughly to avoid injury. Will continue to monitor. Problem: Risk for Impaired Skin Integrity  Goal: Tissue integrity - skin and mucous membranes  Description  Structural intactness and normal physiological function of skin and  mucous membranes. Outcome: Ongoing  Note:   Patient continues to express bathroom and transferring needs to avoid skin breakdown or damage. Will continue to monitor. Problem: Pain:  Goal: Pain level will decrease  Description  Pain level will decrease  Outcome: Ongoing  Note:   Patient continues to express discomfort vs unmanageable pain. PRN pain medication is administered as requested. Will continue to monitor.        Problem: Pain:  Goal: Control of acute pain  Description  Control of acute pain  Outcome: Ongoing     Problem: Pain:  Goal: Control of chronic pain  Description  Control of chronic pain  Outcome: Ongoing

## 2020-02-19 NOTE — PROGRESS NOTES
[facility] Internal Medicine    CONSULTATION / HISTORY AND PHYSICAL EXAMINATION            Date:   2/18/2020  Patient name:  Kosta Dior  Date of admission:  2/12/2020  5:58 PM  MRN:   [id number]  Account:  [de-identified]  YOB: 1950  PCP:    Vanda Deutsch MD  Room:   2602606-  Code Status:    Full Code    Physician Requesting Consult: Ralph Spear MD    Reason for Consult: Medical management    Chief Complaint:     No chief complaint on file. History Obtained From:     patient, electronic medical record    History of Present Illness/ Interval History:     [facility]. She recently had surgery on her back. Patient has history of chronic back pain with symptoms of neurological claudication. She had spinal stenosis, she underwent L4-L5 decompressive surgery. Patient has history of hypertension, hyperlipidemia, diabetes chronic DVT of right leg  Patient had anemia, started on IV Venofer.   Past Medical History:     Past Medical History:   Diagnosis Date    Allergic rhinitis, cause unspecified     Back pain     lumbar    Bowel obstruction (HCC)     history of due to scar tissue, resolved non-surgically    C. difficile diarrhea     CAD (coronary artery disease)     Cellulitis     left leg    Cellulitis 2017 August    leg left leg/bug bite    Diverticulosis of colon (without mention of hemorrhage)     GERD (gastroesophageal reflux disease)     History of MI (myocardial infarction) 2005    thought due to a blood clot    History of ovarian cyst 1970    had oopherectomy    History of peritonitis 1968    due to ruptured appendix age 13    HTN (hypertension)     Hx of blood clots     right leg    Hyperlipidemia     Intestinal or peritoneal adhesions with obstruction (postoperative) (postinfection) ([facility] [address]     Kidney infection     renal failure/sepsis/spider bite    Lateral epicondylitis  of elbow 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, 1.5 pounds    APPENDECTOMY  1968    appendix ruptured, developed peritonitis    BUNIONECTOMY Left     along with calcium deposits removed   [address]  2005    negative    COLECTOMY  1969    12 INCHES REMOVED D/T OBSTRUCTION    COLONOSCOPY      CYST REMOVAL Right     right facial    HYSTERECTOMY  1973    taken as a result of recurring cysts    LUMBAR FUSION N/A 2/10/2020    LUMBAR L4-5 POSTERIOR  DECOMPRESSION INSTRUMENTATION FUSION WCEMENT AUGMENTATION/ performed by Charan Blood MD at [address] [place] [address]  8/14/14    FESS    OVARY REMOVAL  1970    UNILATERAL due to cyst    OVARY REMOVAL  1971    partial, due to cyst    SINUS SURGERY  2004    UPPER GASTROINTESTINAL ENDOSCOPY N/A 5/31/2019    EGD ESOPHAGOGASTRODUODENOSCOPY performed by Donnie Horne MD at [facility] 8/5/2019    EGD BIOPSY performed by Tan Murphy MD at [place] [address] N/A 8/23/2019    EGD BIOPSY performed by Donnie Horne MD at [address] [place] [address] 3/5/2019    WRIST OPEN REDUCTION INTERNAL FIXATION performed by Senait Solorzano MD at [address]        Medications Prior to Admission:     Prior to Admission medications    Medication Sig Start Date End Date Taking? Authorizing Provider   oxyCODONE-acetaminophen (PERCOCET) 5-325 MG per tablet Take 1 tablet by mouth every 6 hours as needed for Pain for up to 7 days. Intended supply: 7 days.  Take lowest dose possible to manage pain 2/12/20 2/19/20 Yes Ching Avalos MD   furosemide (LASIX) 40 MG tablet Take 1 tablet by mouth daily 1/20/20  Yes Carlos Bird MD   ferrous sulfate 325 (65 Fe) MG tablet Take 1 tablet by mouth daily (with breakfast) 1/3/20  Yes Dakota Shearer MD   VITAMIN D, ERGOCALCIFEROL, PO Take by mouth   Yes Historical Provider, MD   hydrALAZINE (APRESOLINE) 50 MG tablet Take 1 tablet by mouth 3 times daily 1/2/20  Yes Carlos Bird MD   citalopram (CELEXA) 10 MG tablet Take 1 tablet by mouth daily 1/2/20  Yes Carlos Bird MD   fenofibrate micronized (LOFIBRA) 134 MG capsule Take 1 capsule by mouth every morning (before breakfast) 11/26/19  Yes Carlos Bird MD   lisinopril (PRINIVIL;ZESTRIL) 10 MG tablet Take 1 tablet by mouth daily 11/26/19  Yes Carlos Bird MD   pramipexole (MIRAPEX) 1 MG tablet Take 1.5 tablets by mouth daily 11/26/19  Yes Carlos Bird MD   atorvastatin (LIPITOR) 80 MG tablet Take 0.5 tablets by mouth nightly 11/11/19  Yes Stevie Corbin MD   pantoprazole (PROTONIX) 40 MG tablet Take 1 tablet by mouth 2 times daily (before meals) 11/11/19  Yes Stevie Corbin MD   SITagliptin (JANUVIA) 100 MG tablet Take 0.5 tablets by mouth daily 10/10/19  Yes Cody Schwartz MD   carvedilol (COREG) 12.5 MG tablet Take 1 tablet by mouth 2 times daily 8/13/19  Yes MAIK Hutchinson - CNP   Calcium Carbonate-Vitamin D (CALCIUM 500/D) 500-125 MG-UNIT TABS Take 1 tablet by mouth 2 times daily    Yes Historical Provider, MD   Lancets MISC 1 each by Does not apply route daily 10/10/19   Adiel Roberts MD   blood glucose monitor strips Test 2 times a day & as needed for symptoms of irregular blood glucose. 10/10/19   Adiel Roberts MD   ondansetron (ZOFRAN) 4 MG tablet Take 1 tablet by mouth daily as needed for Nausea or Vomiting 8/19/19   Adiel Roberts MD   lidocaine (XYLOCAINE) 5 % ointment 4-5 times a day to your right thigh for pain.  3/15/19   Jyoti Phillips MD   nitroGLYCERIN (NITROSTAT) 0.4 MG SL tablet Place 1 tablet under the tongue every 5 minutes as needed for Chest pain 18   Anival Zapata MD        Allergies:     Mobic [meloxicam]; Bactrim [sulfamethoxazole-trimethoprim]; Codeine; and Seasonal    Social History:     Tobacco:    reports that she quit smoking about 2 years ago. Her smoking use included cigarettes. She started smoking about 24 years ago. She has a 10.00 pack-year smoking history. She has never used smokeless tobacco.  Alcohol:      reports no history of alcohol use. Drug Use:  reports no history of drug use. Family History:     Family History   Problem Relation Age of Onset    Stroke Mother     Diabetes Mother     Heart Disease Mother     High Blood Pressure Mother     Heart Disease Father     Heart Disease Brother     High Blood Pressure Brother     Heart Disease Maternal Grandmother     High Blood Pressure Sister        Review of Systems:     Positive and Negative as described in HPI. Constitutional: Negative for chills, fatigue, fever and unexpected weight change. HENT: Negative for ear discharge, facial swelling, nosebleeds, sore throat, trouble swallowing and voice change. Eyes: Negative for redness and visual disturbance. Respiratory: Negative for cough, shortness of breath and wheezing. Cardiovascular: Negative for chest pain, palpitations and leg swelling. Gastrointestinal: Negative for abdominal pain, blood in stool, diarrhea and vomiting. Genitourinary: Negative for difficulty urinating, dysuria and flank pain. Musculoskeletal: Back pain and leg weakness  Skin: Negative for color change and rash. Neurological: Negative for dizziness, seizures, syncope and headaches. Hematological: Negative for adenopathy. Does not bruise/bleed easily.      Physical Exam:     BP (!) 115/43   Pulse 64   Temp 98.7 °F (37.1 °C) (Oral)   Resp 20   Ht 5' 3\" (1.6 m)   Wt 167 lb (75.8 kg)   SpO2 95%   BMI 29.58 kg/m²   Temp (24hrs), Av.7 °F

## 2020-02-19 NOTE — PROGRESS NOTES
20 1301 20 1302   OT Individual Minutes   Time In  3104   Time Out 0830 17[address] [facility] REHABILITATION OCCUPATIONAL THERAPY  DAILY NOTE    Date: 20  Patient Name: Tessa Graham      Room: 8374/7743-11    MRN: [id number]   : 1950  (72 y.o.)  Gender: female   Referring Practitioner: Dr. Rae/Dr. Debo Guzman  Diagnosis: Neurological disorder; Lumbar L4-5 PLIF Vertebroplasty L4 & L5 2/10/20       Restrictions  Restrictions/Precautions: Fall Risk, Surgical Protocols  Implants present? : Metal implants  Other position/activity restrictions: up with assistance  Required Braces or Orthoses?: No  Equipment Used: Other, Wheelchair, Bed    Subjective \"I don't know where I'm at\"         Overall Orientation Status: Within Functional Limits     Pain Assessment  Pain Level: 8  Pain Type: Acute pain  Pain Location: Leg    Objective Patient needs mod vc's for safety due to decreased cognition. Pleasant and cooperative. Handouts on where to buy reacher for d/c plan. Doesn't need DME or any other AE.   Continue POC    Cognition  Overall Cognitive Status: Impaired  Attention Span: Attends with cues to redirect  Safety Judgement: Decreased awareness of need for safety(poor concentration to task )  Awareness of Errors: Decreased awareness of errors  Sequencing and Organization: Assistance required to implement solutions;Assistance required to generate solutions;Assistance required to identify errors made     Transfers  Stand Step Transfers: Supervision(due to poor concentration on safety with transfers )  Stand Pivot Transfers: Supervision  Sit to stand: Modified independent  Stand to sit: Modified independent     Toilet Transfers  Toilet - Technique: Ambulating  Equipment Used: Raised toilet seat with rails  Toilet Transfer: Supervision     Type of ROM/Therapeutic Exercise  Type of ROM/Therapeutic Exercise: AROM  Exercises  Shoulder Flexion: x  Shoulder Extension: x  Elbow Flexion: x  Elbow Extension: x  Grasp/Release: x  Other: 3 sets of 6 in 2 planes, 1 set of 20 for tricep pulldowns  using light theraband          static standing tolerance 10 minutes + (mod I)              ADL  Grooming: Supervision(supervision due to poor concentration to task )  UE Bathing: Supervision  LE Bathing: Supervision  UE Dressing: Supervision  LE Dressing: Supervision  Toileting: Supervision          Assessment  Performance deficits / Impairments: Decreased functional mobility ; Decreased ADL status; Decreased strength;Decreased safe awareness;Decreased endurance;Decreased cognition;Decreased balance;Decreased high-level IADLs;Decreased fine motor control;Decreased coordination;Decreased posture;Decreased sensation  Prognosis: Good  Activity Tolerance: Patient Tolerated treatment well             Patient Education:  Patient Goals   Patient goals : \"To be able to do all the things I need to do\"  Learner:patient  Method: explanation       Outcome: needs reinforcement        Plan  Plan  Times per week: 5-7  Times per day: Twice a day  Current Treatment Recommendations: Self-Care / ADL, Home Management Training, Strengthening, Balance Training, Functional Mobility Training, Endurance Training, Cognitive Reorientation, Pain Management, Safety Education & Training, Patient/Caregiver Education & Training, Equipment Evaluation, Education, & procurement, Cognitive/Perceptual Training  Patient Goals   Patient goals : \"To be able to do all the things I need to do\"  Short term goals  Time Frame for Short term goals: One week  Short term goal 1: Pt will V/D Good understanding of AE/DME/modified techniques for increased IND with self-care and mobility. Short term goal 2: Pt will perform UB ADLs with SUP and LB ADLs with SBA and Good safety.   Short term goal 3: Pt will actively participate in 30+ minutes of therapeutic exercise/functional activities to promote increased IND with self-care and mobility. Short term goal 4: Pt will stand for 5+ minutes with 0-1 UE support, SBA, and no LOB while engaging in functional activity of choice. Short term goal 5: Pt will perform functional mobility and transfers during self-care consistently with SBA, least restrictive mobility device, and Good safety. Long term goals  Time Frame for Long term goals : By discharge  Long term goal 1: Pt will perform BADLs with MI and Good safety. Long term goal 2: Pt will perform functional mobility and transfers during self-care with MI, least restrictive mobility device, and Good safety. Long term goal 3: Pt will stand for 10+ minutes with 0-1 UE support, MI, and no LOB while engaging in functional activity of choice. Long term goal 4: Pt will V/D Good understanding of Fall Prevention strategies for increased safety and IND with self-care and mobility. Long term goal 5: Pt will perform simple meal prep/light housekeeping with MI and Good safety.        Electronically signed by SHWETA Lilly on 2/19/20 at 1:02 PM

## 2020-02-19 NOTE — FLOWSHEET NOTE
Patient alone in room, excited about her day. Patient received visit from co-workers from her old job. Patient continue to have good family support.  was able to be a listening presence, patient concerned that her  will not have patience   as a caregiver when she return home. Spiritual care will continue to offer support. 02/18/20 1730   Encounter Summary   Services provided to: Patient   Referral/Consult From: [facility]   Support System Spouse; Children   Continue Visiting   (2/18/20)   Complexity of Encounter Low   Length of Encounter 15 minutes   Spiritual Assessment Completed Yes   Routine   Type Follow up   Assessment Calm; Approachable   Intervention Active listening;Explored feelings, thoughts, concerns;Nurtured hope;Prayer;Sustaining presence/ Ministry of presence; Discussed illness/injury and it's impact   Outcome Expressed gratitude;Engaged in conversation;Expressed feelings/needs/concerns; Hopeful;Receptive   Visited by Monae Daniel

## 2020-02-20 LAB
ANION GAP SERPL CALCULATED.3IONS-SCNC: 10 MMOL/L (ref 9–17)
BUN BLDV-MCNC: 49 MG/DL (ref 8–23)
BUN/CREAT BLD: ABNORMAL (ref 9–20)
CALCIUM SERPL-MCNC: 9.1 MG/DL (ref 8.6–10.4)
CHLORIDE BLD-SCNC: 98 MMOL/L (ref 98–107)
CO2: 31 MMOL/L (ref 20–31)
CREAT SERPL-MCNC: 1.83 MG/DL (ref 0.5–0.9)
GFR AFRICAN AMERICAN: 33 ML/MIN
GFR NON-AFRICAN AMERICAN: 27 ML/MIN
GFR SERPL CREATININE-BSD FRML MDRD: ABNORMAL ML/MIN/{1.73_M2}
GFR SERPL CREATININE-BSD FRML MDRD: ABNORMAL ML/MIN/{1.73_M2}
GLUCOSE BLD-MCNC: 103 MG/DL (ref 65–105)
GLUCOSE BLD-MCNC: 103 MG/DL (ref 70–99)
GLUCOSE BLD-MCNC: 141 MG/DL (ref 65–105)
GLUCOSE BLD-MCNC: 89 MG/DL (ref 65–105)
GLUCOSE BLD-MCNC: 91 MG/DL (ref 65–105)
POTASSIUM SERPL-SCNC: 4.8 MMOL/L (ref 3.7–5.3)
SODIUM BLD-SCNC: 139 MMOL/L (ref 135–144)

## 2020-02-20 PROCEDURE — 97110 THERAPEUTIC EXERCISES: CPT

## 2020-02-20 PROCEDURE — 6370000000 HC RX 637 (ALT 250 FOR IP): Performed by: ORTHOPAEDIC SURGERY

## 2020-02-20 PROCEDURE — 97530 THERAPEUTIC ACTIVITIES: CPT

## 2020-02-20 PROCEDURE — 36415 COLL VENOUS BLD VENIPUNCTURE: CPT

## 2020-02-20 PROCEDURE — 6370000000 HC RX 637 (ALT 250 FOR IP): Performed by: PHYSICAL MEDICINE & REHABILITATION

## 2020-02-20 PROCEDURE — 1180000000 HC REHAB R&B

## 2020-02-20 PROCEDURE — 6370000000 HC RX 637 (ALT 250 FOR IP): Performed by: INTERNAL MEDICINE

## 2020-02-20 PROCEDURE — 97130 THER IVNTJ EA ADDL 15 MIN: CPT

## 2020-02-20 PROCEDURE — 97116 GAIT TRAINING THERAPY: CPT

## 2020-02-20 PROCEDURE — 99232 SBSQ HOSP IP/OBS MODERATE 35: CPT | Performed by: INTERNAL MEDICINE

## 2020-02-20 PROCEDURE — 80048 BASIC METABOLIC PNL TOTAL CA: CPT

## 2020-02-20 PROCEDURE — 97542 WHEELCHAIR MNGMENT TRAINING: CPT

## 2020-02-20 PROCEDURE — 97129 THER IVNTJ 1ST 15 MIN: CPT

## 2020-02-20 PROCEDURE — 97535 SELF CARE MNGMENT TRAINING: CPT

## 2020-02-20 PROCEDURE — 82947 ASSAY GLUCOSE BLOOD QUANT: CPT

## 2020-02-20 PROCEDURE — 99231 SBSQ HOSP IP/OBS SF/LOW 25: CPT | Performed by: PHYSICAL MEDICINE & REHABILITATION

## 2020-02-20 RX ADMIN — Medication 1 TABLET: at 09:24

## 2020-02-20 RX ADMIN — PRAMIPEXOLE DIHYDROCHLORIDE 1.5 MG: 1.5 TABLET ORAL at 20:34

## 2020-02-20 RX ADMIN — SENNOSIDES AND DOCUSATE SODIUM 1 TABLET: 8.6; 5 TABLET ORAL at 09:25

## 2020-02-20 RX ADMIN — SENNOSIDES AND DOCUSATE SODIUM 1 TABLET: 8.6; 5 TABLET ORAL at 20:33

## 2020-02-20 RX ADMIN — ATORVASTATIN CALCIUM 40 MG: 40 TABLET, FILM COATED ORAL at 20:33

## 2020-02-20 RX ADMIN — LINAGLIPTIN 5 MG: 5 TABLET, FILM COATED ORAL at 09:32

## 2020-02-20 RX ADMIN — FENOFIBRATE 160 MG: 160 TABLET ORAL at 05:18

## 2020-02-20 RX ADMIN — OXYCODONE HYDROCHLORIDE 10 MG: 5 TABLET ORAL at 14:14

## 2020-02-20 RX ADMIN — OXYCODONE HYDROCHLORIDE 10 MG: 5 TABLET ORAL at 18:43

## 2020-02-20 RX ADMIN — PANTOPRAZOLE SODIUM 40 MG: 40 TABLET, DELAYED RELEASE ORAL at 05:18

## 2020-02-20 RX ADMIN — PANTOPRAZOLE SODIUM 40 MG: 40 TABLET, DELAYED RELEASE ORAL at 16:33

## 2020-02-20 RX ADMIN — CITALOPRAM HYDROBROMIDE 10 MG: 10 TABLET ORAL at 09:25

## 2020-02-20 RX ADMIN — OXYCODONE HYDROCHLORIDE 10 MG: 5 TABLET ORAL at 05:18

## 2020-02-20 RX ADMIN — Medication 1 TABLET: at 20:34

## 2020-02-20 RX ADMIN — RIVAROXABAN 10 MG: 10 TABLET, FILM COATED ORAL at 16:33

## 2020-02-20 RX ADMIN — FERROUS SULFATE TAB 325 MG (65 MG ELEMENTAL FE) 325 MG: 325 (65 FE) TAB at 09:24

## 2020-02-20 RX ADMIN — OXYCODONE HYDROCHLORIDE 10 MG: 5 TABLET ORAL at 09:28

## 2020-02-20 ASSESSMENT — PAIN DESCRIPTION - LOCATION
LOCATION: LEG
LOCATION: BACK

## 2020-02-20 ASSESSMENT — PAIN DESCRIPTION - PAIN TYPE
TYPE: CHRONIC PAIN
TYPE: ACUTE PAIN;SURGICAL PAIN
TYPE: CHRONIC PAIN

## 2020-02-20 ASSESSMENT — PAIN DESCRIPTION - FREQUENCY
FREQUENCY: CONTINUOUS

## 2020-02-20 ASSESSMENT — PAIN DESCRIPTION - ONSET
ONSET: ON-GOING

## 2020-02-20 ASSESSMENT — PAIN DESCRIPTION - DESCRIPTORS
DESCRIPTORS: ACHING;DISCOMFORT
DESCRIPTORS: ACHING
DESCRIPTORS: ACHING
DESCRIPTORS: ACHING;DISCOMFORT

## 2020-02-20 ASSESSMENT — PAIN SCALES - GENERAL
PAINLEVEL_OUTOF10: 7
PAINLEVEL_OUTOF10: 8
PAINLEVEL_OUTOF10: 4
PAINLEVEL_OUTOF10: 6
PAINLEVEL_OUTOF10: 8
PAINLEVEL_OUTOF10: 7
PAINLEVEL_OUTOF10: 0
PAINLEVEL_OUTOF10: 3
PAINLEVEL_OUTOF10: 6

## 2020-02-20 ASSESSMENT — PAIN DESCRIPTION - ORIENTATION
ORIENTATION: LOWER
ORIENTATION: RIGHT;LEFT

## 2020-02-20 ASSESSMENT — PAIN DESCRIPTION - PROGRESSION
CLINICAL_PROGRESSION: GRADUALLY IMPROVING
CLINICAL_PROGRESSION: NOT CHANGED

## 2020-02-20 ASSESSMENT — PAIN - FUNCTIONAL ASSESSMENT: PAIN_FUNCTIONAL_ASSESSMENT: PREVENTS OR INTERFERES SOME ACTIVE ACTIVITIES AND ADLS

## 2020-02-20 ASSESSMENT — PAIN DESCRIPTION - DIRECTION: RADIATING_TOWARDS: TAILBONE

## 2020-02-20 NOTE — PLAN OF CARE
Problem: Falls - Risk of:  Goal: Will remain free from falls  Description  Will remain free from falls  Outcome: Met This Shift  Note:   Patient continues to express needs and uses call light efficiently. Problem: Risk for Impaired Skin Integrity  Goal: Tissue integrity - skin and mucous membranes  Description  Structural intactness and normal physiological function of skin and  mucous membranes. Outcome: Ongoing  Note:   Patient remains continent and free of breakdown. Frequent hydration also offered. Will continue to monitor. Problem: Pain:  Goal: Pain level will decrease  Description  Pain level will decrease  Outcome: Ongoing  Note:   Patient is able to find comfortable positioning with ongoing chronic pain. PRN pain medications are administered as needed.

## 2020-02-20 NOTE — PROGRESS NOTES
Physical Therapy  Facility/Department: [facility] ACUTE REHAB  Daily Treatment Note  NAME: Connie Burnett  : 1950  MRN: [id number]    Date of Service: 2020    Discharge Recommendations:  Home with assist PRN, Patient would benefit from continued therapy after discharge   PT Equipment Recommendations  Equipment Needed: No    Assessment      REQUIRES PT FOLLOW UP: Yes  Activity Tolerance  Activity Tolerance: Patient limited by pain; Patient limited by fatigue  Activity Tolerance: tx limited to bathroom break x2 with increased time to toilet. Other Comments  Comments: Chronic R DVT, L lateral rib fx 8-10(must have celeste hose on when up per order)     Patient Diagnosis(es): There were no encounter diagnoses. has a past medical history of Allergic rhinitis, cause unspecified, Back pain, Bowel obstruction (HCC), C. difficile diarrhea, CAD (coronary artery disease), Cellulitis, Cellulitis, Diverticulosis of colon (without mention of hemorrhage), GERD (gastroesophageal reflux disease), History of MI (myocardial infarction), History of ovarian cyst, History of peritonitis, HTN (hypertension), Hx of blood clots, Hyperlipidemia, Intestinal or peritoneal adhesions with obstruction (postoperative) (postinfection) ([facility] [address] Kidney infection, Lateral epicondylitis  of elbow, Muscle strain, Other abnormal glucose, PONV (postoperative nausea and vomiting), Pre-diabetes, Restless legs syndrome (RLS), TIA (transient ischemic attack), Vitamin D deficiency, and Wears glasses. has a past surgical history that includes Ovary removal (); colectomy (); Appendectomy (); Ovary removal (); Hysterectomy (); Bunionectomy (Left); sinus surgery (); Colonoscopy; other surgical history (14); cyst removal (Right); Wrist fracture surgery (Left, 3/5/2019); Upper gastrointestinal endoscopy (N/A, 2019); Upper gastrointestinal endoscopy (N/A, 2019); Upper gastrointestinal endoscopy (N/A, 2019);  Abdomen surgery (1976); Cardiac catheterization; Cardiac catheterization (2005); and lumbar fusion (N/A, 2/10/2020). Restrictions  Restrictions/Precautions  Restrictions/Precautions: Fall Risk, Surgical Protocols  Required Braces or Orthoses?: No  Implants present? : Metal implants  Position Activity Restriction  Other position/activity restrictions: up with assistance  Subjective   General  Chart Reviewed: Yes  Additional Pertinent Hx: Kristan Pugh is 72 y.o.,  female, undergoing preadmission testing for Spondylolisthesis with scheduled Lumbar L4-L5 Posterior Decompression/ Fusion. Pt has lumbar degenerative disc disease. The initial symptoms started 4 years ago. 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 Pain described as constant, aching rating 5/10. Pain is aggravated with standing. Sitting relieves pain. Pt c/o of pain in the lumbar spine area, radiates to the lower limbs worse on the left side. Pt reports pain radiates down left thigh to knee and down right thigh causing tingling. Pt reports difficulty with ambulation due to back pain and \"balance issues\". Uses a cane for ambulation. Has sustained multiple falls with last fall being within last week. No redness, swelling or rashes. Pt has tried injections, physical therapy and NSAIDS. She can no longer take NSAIDS as she developed a bleeding ulcer and required a blood transfusion in August of 2018. Pt currently goes to pain management and has been on Percocet with minimal relief of back pain. Pt admitted to [facility] 2/12/20. Family / Caregiver Present: No  Referring Practitioner: Spike Molina MD  Subjective  Subjective: Pt reports not sleeping last night, Dr. Tracy Blakely present and aware. Pt expresses concerns with bedroom set up at home.    General Comment  Comments: MEENU Lackey ok's tx, Pt positive for DVT in R gastrocnemius vein, No EPC cuffs per MEENU Valles  Pain Screening  Patient Currently in Pain: Denies  Vital Signs  Patient Currently in Pain: EOM sitting ~ 5mins   Other exercises 6: supine bilat. LE ex, AROM, 15x each      Goals  Short term goals  Time Frame for Short term goals: 1 week  Short term goal 1: Pt to demonstrate CGA/SBA bed mobility with good log rolling technique. Short term goal 2: Pt to demonstrate min/CGA x1 transfers with good technique. Short term goal 3: Pt to amb 76' with RW CGA. Short term goal 4: Pt to 3-5 steps, min x1 with 1-2 HR. Short term goal 5: Pt to improve standing tolerance to at least 4-5 minutes with 1 UE support, CGA/SBA. Short term goal 6: Pt to improve sitting balance (Static/dynamic) to Good with good posture. Long term goals  Time Frame for Long term goals : by D/C  Long term goal 1: Pt to progress to Mod I bed mobility. Long term goal 2: Pt to perform Mod I transfers with device. Long term goal 3: Pt to amb 150' with device, Mod I.  Long term goal 4: Pt to perform 12 stairs per home set up, 1 HR, SBA with device prn. Long term goal 5: Pt to improve B LE strength by 1/2 MMG. Long term goal 6: Pt to progress to 2MWT with device Mod I at least 100'. Long term goal 7: Pt to improve Tinetti to at least 20/28 to reduce fall risk and improve static/dynamic balance.   Patient Goals   Patient goals : \"I want to be able to get in and out of bed on my own\"    Plan    Plan  Times per week: 1.5 hr/day, 5-7 days/week  Current Treatment Recommendations: Strengthening, Balance Training, Functional Mobility Training, Transfer Training, Endurance Training, Gait Training, Stair training, Home Exercise Program, Safety Education & Training, Patient/Caregiver Education & Training, Equipment Evaluation, Education, & procurement, Positioning  Safety Devices  Type of devices: Gait belt, All fall risk precautions in place, Call light within reach, Left in bed, Nurse notified     Therapy Time         02/20/20 1030 02/20/20 1620   PT Individual Minutes   Time In [phone]   Time Out [id number]   Minutes 55 43   Total Minutes: [address], PTA

## 2020-02-20 NOTE — PROGRESS NOTES
[facility] Internal Medicine    CONSULTATION / HISTORY AND PHYSICAL EXAMINATION            Date:   2/19/2020  Patient name:  Shane Jimenez  Date of admission:  2/12/2020  5:58 PM  MRN:   [id number]  Account:  [de-identified]  YOB: 1950  PCP:    Marika Beltran MD  Room:   2606/2606-01  Code Status:    Full Code    Physician Requesting Consult: Riaz Gaston MD    Reason for Consult: Medical management    Chief Complaint:     No chief complaint on file. History Obtained From:     patient, electronic medical record    History of Present Illness/ Interval History:     Patient admitted to [facility] acute rehab for rehabilitation. She recently had surgery on her back. Patient has history of chronic back pain with symptoms of neurological claudication. She had spinal stenosis, she underwent L4-L5 decompressive surgery. Patient has history of hypertension, hyperlipidemia, diabetes chronic DVT of right leg  Patient had anemia, started on IV Venofer.   Past Medical History:     Past Medical History:   Diagnosis Date    Allergic rhinitis, cause unspecified     Back pain     lumbar    Bowel obstruction (HCC)     history of due to scar tissue, resolved non-surgically    C. difficile diarrhea     CAD (coronary artery disease)     Cellulitis     left leg    Cellulitis 2017 August    leg left leg/bug bite    Diverticulosis of colon (without mention of hemorrhage)     GERD (gastroesophageal reflux disease)     History of MI (myocardial infarction) 2005    thought due to a blood clot    History of ovarian cyst 1970    had oopherectomy    History of peritonitis 1968    due to ruptured appendix age 13    HTN (hypertension)     Hx of blood clots     right leg    Hyperlipidemia     Intestinal or peritoneal adhesions with obstruction (postoperative) (postinfection) ([facility] [address]     Kidney infection     renal failure/sepsis/spider bite    Lateral epicondylitis  of elbow to manage pain 2/12/20 2/19/20 Yes Vesta Figueroa MD   furosemide (LASIX) 40 MG tablet Take 1 tablet by mouth daily 1/20/20  Yes Kiera Sena MD   ferrous sulfate 325 (65 Fe) MG tablet Take 1 tablet by mouth daily (with breakfast) 1/3/20  Yes Charlynn Dakins, MD   VITAMIN D, ERGOCALCIFEROL, PO Take by mouth   Yes Historical Provider, MD   hydrALAZINE (APRESOLINE) 50 MG tablet Take 1 tablet by mouth 3 times daily 1/2/20  Yes Kiera Sena MD   citalopram (CELEXA) 10 MG tablet Take 1 tablet by mouth daily 1/2/20  Yes Kiera Sena MD   fenofibrate micronized (LOFIBRA) 134 MG capsule Take 1 capsule by mouth every morning (before breakfast) 11/26/19  Yes Kiera Sena MD   lisinopril (PRINIVIL;ZESTRIL) 10 MG tablet Take 1 tablet by mouth daily 11/26/19  Yes Kiera Sena MD   pramipexole (MIRAPEX) 1 MG tablet Take 1.5 tablets by mouth daily 11/26/19  Yes Kiera Sena MD   atorvastatin (LIPITOR) 80 MG tablet Take 0.5 tablets by mouth nightly 11/11/19  Yes John Monsalve MD   pantoprazole (PROTONIX) 40 MG tablet Take 1 tablet by mouth 2 times daily (before meals) 11/11/19  Yes John Monsalve MD   SITagliptin (JANUVIA) 100 MG tablet Take 0.5 tablets by mouth daily 10/10/19  Yes Howard Schwartz MD   carvedilol (COREG) 12.5 MG tablet Take 1 tablet by mouth 2 times daily 8/13/19  Yes Nichole Homans, APRN - CNP   Calcium Carbonate-Vitamin D (CALCIUM 500/D) 500-125 MG-UNIT TABS Take 1 tablet by mouth 2 times daily    Yes Historical Provider, MD   Lancets MISC 1 each by Does not apply route daily 10/10/19   Reed Feldman MD   blood glucose monitor strips Test 2 times a day & as needed for symptoms of irregular blood glucose. 10/10/19   Reed Feldman MD   ondansetron (ZOFRAN) 4 MG tablet Take 1 tablet by mouth daily as needed for Nausea or Vomiting 8/19/19   Reed Feldman MD   lidocaine (XYLOCAINE) 5 % ointment 4-5 times a day to your right thigh for pain.  3/15/19   Alan Schaeffer MD   nitroGLYCERIN (NITROSTAT)

## 2020-02-20 NOTE — PROGRESS NOTES
Speech Language Pathology  [facility]  Cognitive Treatment Note    Date: 2/20/2020  Patients Name: Dannielle Jaffe  MRN: [id number]  Diagnosis: Mild cognitive impairment  Patient Active Problem List   Diagnosis Code    Other specified disorders of rotator cuff syndrome of shoulder and allied disorders M75.100    Diverticulosis of large intestine K57.30    Intestinal or peritoneal adhesions with obstruction (postoperative) (postinfection) ([address] K56.50    Restless legs syndrome (RLS) G25.81    GERD (gastroesophageal reflux disease) K21.9    Essential hypertension I10    Mixed hyperlipidemia E78.2    Other abnormal glucose R73.09    Atherosclerosis I70.90    Lateral epicondylitis M77.10    Allergic rhinitis J30.9    Vitamin D deficiency E55.9    Depression F32.9    Degenerative joint disease (DJD) of hip M16.9    Peripheral edema R60.9    Injury of foot, left H69.085V    Facial droop R29.810    CVA (cerebral vascular accident) ([facility] [address] I63.9    BIN (acute kidney injury) ([facility] [address] N17.9    Bradycardia R00.1    Ataxia R27.0    Aphasia R47.01    TIA (transient ischemic attack) G45.9    Need for prophylactic vaccination and inoculation against cholera alone Z23    Chest pain R07.9    Osteopenia M85.80    Lumbago M54.5    Facet arthritis of lumbar region M47.816    Meralgia paresthetica of right side G57.11    Lumbar degenerative disc disease M51.36    Spondylosis of lumbar region without myelopathy or radiculopathy M47.816    Blood poisoning [id number]    Cellulitis of left lower extremity L03. 80    Encounter for medication monitoring Z51.81    Deep vein thrombosis (DVT) of right lower extremity (HCC) I82.401    Lumbar facet arthropathy M47.816    Shortness of breath R06.02    Chronic deep vein thrombosis (DVT) of proximal vein of both lower extremities (HCC) I82.5Y3    Chronic diastolic heart failure (HCC) I50.32    Neurogenic claudication due to lumbar spinal stenosis M48.062    Sacroiliitis (HCC) M46.1    Stage 3 chronic kidney disease (HCC) N18.3    Stage 3 chronic kidney disease (HCC) N18.3    Type 2 diabetes mellitus with circulatory disorder, without long-term current use of insulin (HCC) E11.59    Chronic deep vein thrombosis (DVT) of popliteal vein of right lower extremity ([facility]) I82.531    Closed fracture of left wrist S62.102A    B12 deficiency E53.8    Iron deficiency anemia secondary to inadequate dietary iron intake D50.8    Diarrhea due to malabsorption K90.9, R19.7    Closed head injury S09.90XA    Scalp laceration S01. 01XA    Abnormal finding on imaging R93.89    Other irritable bowel syndrome K58.8    Frequent falls R29.6    Double vision H53.2    Muscle soreness M79.10    GI bleed K92.2    Peptic ulcer K27.9    Melena K92.1    PUD (peptic ulcer disease) K27.9    Absolute anemia D64.9    Acute blood loss anemia D62    Acute renal failure (HCC) N17.9    Clostridium difficile infection A49.8    Acquired spondylolisthesis M43.10    Spinal stenosis of lumbar region with neurogenic claudication M48.062    Acute deep vein thrombosis (DVT) of proximal vein of left lower extremity ([facility]) I82.4Y2    Leg swelling M79.89    Back pain M54.9    Neurological disorder G98.8       Pain: no    Cognitive Treatment    Treatment time: 1721-2343      Subjective: [x] Alert [x] Cooperative     [] Confused     [] Agitated    [] Lethargic      Objective/Assessment:    Attention: Sustained throughout, distractions minimized     Recall:   Paragraph retention 88% immediate recall, improving to 100% with mod cues  Working memory exercise (mental manipulation scrambled sentences, 4 unit immediate recall) 70% accuracy sadiq, improving to 90% with mod verbal cues provided.      Problem Solving/Reasoning:   Sequential thought (household sequences, 4 step) 50% accuracy sadiq, improving with max cues provided    Other:   No family present during session       Plan:  [x] Continue ST

## 2020-02-20 NOTE — PROGRESS NOTES
Physical Medicine & Rehabilitation  Progress Note      Subjective:      72year-old female with lumbar spinal stenosis s/p L4-5 posterior decompression/fusion and vertebroplasty L4 and L5. Patient is well, and has had no acute complaints or problems. She is asymptomatic from bradycardia and low diastolic BP.     ROS:  Denies fevers, chills, sweats. No chest pain, palpitations, lightheadedness. Denies coughing, wheezing or shortness of breath. Denies abdominal pain, nausea, diarrhea or constipation. No new areas of joint pain. Denies new areas of numbness or weakness. Denies new anxiety or depression issues. No new skin problems. Rehabilitation:   Progressing in therapies. PT:  Restrictions/Precautions: Fall Risk, Surgical Protocols  Implants present? : Metal implants  Other position/activity restrictions: up with assistance   Transfers  Sit to Stand: Contact guard assistance(RW)  Stand to sit: Contact guard assistance(Guarded/slow with descent. RW)  Bed to Chair: Contact guard assistance(RW)  Stand Pivot Transfers: Contact guard assistance(no device, W/C to bed)  Squat Pivot Transfers: Minimal Assistance  Comment: Slow sit to stand transfers d/t low back pain. Cues for feet and hand positioning. Ambulation 1  Surface: level tile  Device: Rolling Walker  Other Apparatus: (IV pole)  Assistance: Stand by assistance  Quality of Gait: No LOB; slow pace. VCs given to maintain upright posture/head up and stay within RW. Varied step length; pt c/o increased tailbone pain with lengthy stride  Gait Deviations: Slow Alicia, Decreased step length, Decreased step height  Distance: 180' a.m. Comments: VCs for safety. Transfers  Sit to Stand: Contact guard assistance(RW)  Stand to sit: Contact guard assistance(Guarded/slow with descent.  RW)  Bed to Chair: Contact guard assistance(RW)  Stand Pivot Transfers: Contact guard assistance(no device, W/C to bed)  Squat Pivot Transfers: Minimal Assistance  Comment: Slow sit to stand transfers d/t low back pain. Cues for feet and hand positioning. Ambulation  Ambulation?: Yes  More Ambulation?: Yes  Ambulation 1  Surface: level tile  Device: Rolling Walker  Other Apparatus: (IV pole)  Assistance: Stand by assistance  Quality of Gait: No LOB; slow pace. VCs given to maintain upright posture/head up and stay within RW. Varied step length; pt c/o increased tailbone pain with lengthy stride  Gait Deviations: Slow Alicia, Decreased step length, Decreased step height  Distance: 180' a.m. Comments: VCs for safety. Surface: level tile  Ambulation 1  Surface: level tile  Device: Rolling Walker  Other Apparatus: (IV pole)  Assistance: Stand by assistance  Quality of Gait: No LOB; slow pace. VCs given to maintain upright posture/head up and stay within RW. Varied step length; pt c/o increased tailbone pain with lengthy stride  Gait Deviations: Slow Alicia, Decreased step length, Decreased step height  Distance: 180' a.m. Comments: VCs for safety. OT:  ADL  Feeding: Setup, Increased time to complete  Grooming: Supervision(supervision due to poor concentration to task )  UE Bathing: Supervision  LE Bathing: Supervision  UE Dressing: Supervision  LE Dressing: Supervision  Toileting: Supervision  Additional Comments: Pt engaged in showering routine this date. Bandage over lumbar incision and IV on L wrist covered with plastic during shower to keep dry. Pt requires Min/Mod VCs for safety and attention to task.    Instrumental ADL's  Instrumental ADLs: Yes     Balance  Sitting Balance: Supervision  Standing Balance: Supervision(10 to 12 minutes holding on to walker )   Standing Balance  Time: 2 min x3   Activity: Transfers, ADL   Comment: 0-1 UE support   Functional Mobility  Functional - Mobility Device: Rolling Walker  Activity: Other  Assist Level: Supervision  Functional Mobility Comments: ambulating around gift shop and back to acute rehab      Bed mobility  Bridging: Stand by SpO2 94%   BMI 29.58 kg/m²       GEN: well developed, well nourished, NAD  HEENT: NCAT, PERRL, EOMI, mucous membranes pink and moist  CV: RRR, no murmurs, rubs or gallops  PULM: CTAB, no rales or rhonchi. Respirations WNL and unlabored  ABD: soft, NT, ND, BS+ and equal  NEURO: A&O x3. Sensation intact to light touch. MSK: Functional ROM all extremities. Strength 4+/5 key muscles BUEs. Strength 4/5 key muscles RLE on hip flexion, knee extension, plantar and dorsiflexion. Strength 4/5 key muscles LLE on hip flexion, knee extension, plantar and dorsiflexion.   EXTREMITIES: No calf tenderness to palpation bilaterally. No edema BLEs  SKIN: warm dry and intact with good turgor except healing lumbar incision with dressing CD&I  PSYCH: appropriately interactive. Affect WNL.     Diagnostics:     BLE Doppler 2/19/2020:  Summary        Simultaneous real time imaging utilizing B-Mode, color doppler and    spectral waveform analysis was performed on the bilateral lower    extremities for venous examination of the deep and superficial systems.    Findings are:        Right:    Chronic deep venous thrombosis identified in the gastrocnemius vein.        Left:    No evidence of deep or superficial venous thrombosis. CBC: No results for input(s): WBC, RBC, HGB, HCT, MCV, RDW, PLT in the last 72 hours. BMP:   Recent Labs     02/19/20  0657 02/20/20  0624    139   K 4.9 4.8   CL 96* 98   CO2 29 31   BUN 43* 49*   CREATININE 1.82* 1.83*   GLUCOSE 101* 103*     BNP: No results for input(s): BNP in the last 72 hours. PT/INR: No results for input(s): PROTIME, INR in the last 72 hours. APTT: No results for input(s): APTT in the last 72 hours. CARDIAC ENZYMES: No results for input(s): CKMB, CKMBINDEX, TROPONINT in the last 72 hours.     Invalid input(s): CKTOTAL;3  FASTING LIPID PANEL:  Lab Results   Component Value Date    CHOL 103 05/20/2019    HDL 48 05/20/2019    TRIG 66 05/20/2019     LIVER PROFILE: No results for Celexa  4. Impaired cognition: speech therapy treating  5. Blood loss anemia: s/p IV iron post op. stable  6. Mild leukocytosis: Resolved. WBC WNL  7. DM: on tradjenta  8. GERD: Hx GI bleed, on protonix  9. Internal medicine for medical management  10. DVT prophylaxis/chronic R gastroc DVT:  Stable on doppler 2/19. Started on Xarelto 2/18 as per Dr. Katerine Medina. Electronically signed by Francie Cason MD on 2/20/2020 at 8:28 AM      This note is created with the assistance of a speech recognition program.  While intending to generate a document that actually reflects the content of the visit, the document can still have some errors including those of syntax and sound a like substitutions which may escape proof reading. In such instances, actual meaning can be extrapolated by contextual diversion.

## 2020-02-21 LAB
ANION GAP SERPL CALCULATED.3IONS-SCNC: 10 MMOL/L (ref 9–17)
BUN BLDV-MCNC: 40 MG/DL (ref 8–23)
BUN/CREAT BLD: ABNORMAL (ref 9–20)
CALCIUM SERPL-MCNC: 9.2 MG/DL (ref 8.6–10.4)
CHLORIDE BLD-SCNC: 101 MMOL/L (ref 98–107)
CO2: 29 MMOL/L (ref 20–31)
CREAT SERPL-MCNC: 1.61 MG/DL (ref 0.5–0.9)
GFR AFRICAN AMERICAN: 38 ML/MIN
GFR NON-AFRICAN AMERICAN: 32 ML/MIN
GFR SERPL CREATININE-BSD FRML MDRD: ABNORMAL ML/MIN/{1.73_M2}
GFR SERPL CREATININE-BSD FRML MDRD: ABNORMAL ML/MIN/{1.73_M2}
GLUCOSE BLD-MCNC: 100 MG/DL (ref 70–99)
GLUCOSE BLD-MCNC: 135 MG/DL (ref 65–105)
GLUCOSE BLD-MCNC: 140 MG/DL (ref 65–105)
GLUCOSE BLD-MCNC: 90 MG/DL (ref 65–105)
GLUCOSE BLD-MCNC: 91 MG/DL (ref 65–105)
HCT VFR BLD CALC: 29.7 % (ref 36–46)
HEMOGLOBIN: 10 G/DL (ref 12–16)
MCH RBC QN AUTO: 31.6 PG (ref 26–34)
MCHC RBC AUTO-ENTMCNC: 33.7 G/DL (ref 31–37)
MCV RBC AUTO: 93.8 FL (ref 80–100)
NRBC AUTOMATED: ABNORMAL PER 100 WBC
PDW BLD-RTO: 14.3 % (ref 11.5–14.9)
PLATELET # BLD: 549 K/UL (ref 150–450)
PMV BLD AUTO: 7.4 FL (ref 6–12)
POTASSIUM SERPL-SCNC: 4.7 MMOL/L (ref 3.7–5.3)
RBC # BLD: 3.17 M/UL (ref 4–5.2)
SODIUM BLD-SCNC: 140 MMOL/L (ref 135–144)
WBC # BLD: 8.2 K/UL (ref 3.5–11)

## 2020-02-21 PROCEDURE — 36415 COLL VENOUS BLD VENIPUNCTURE: CPT

## 2020-02-21 PROCEDURE — 99232 SBSQ HOSP IP/OBS MODERATE 35: CPT | Performed by: PHYSICAL MEDICINE & REHABILITATION

## 2020-02-21 PROCEDURE — 85027 COMPLETE CBC AUTOMATED: CPT

## 2020-02-21 PROCEDURE — 82947 ASSAY GLUCOSE BLOOD QUANT: CPT

## 2020-02-21 PROCEDURE — 6370000000 HC RX 637 (ALT 250 FOR IP): Performed by: PHYSICAL MEDICINE & REHABILITATION

## 2020-02-21 PROCEDURE — 97116 GAIT TRAINING THERAPY: CPT

## 2020-02-21 PROCEDURE — 97130 THER IVNTJ EA ADDL 15 MIN: CPT

## 2020-02-21 PROCEDURE — 99232 SBSQ HOSP IP/OBS MODERATE 35: CPT | Performed by: INTERNAL MEDICINE

## 2020-02-21 PROCEDURE — 97530 THERAPEUTIC ACTIVITIES: CPT

## 2020-02-21 PROCEDURE — 97150 GROUP THERAPEUTIC PROCEDURES: CPT

## 2020-02-21 PROCEDURE — 97129 THER IVNTJ 1ST 15 MIN: CPT

## 2020-02-21 PROCEDURE — 6370000000 HC RX 637 (ALT 250 FOR IP): Performed by: INTERNAL MEDICINE

## 2020-02-21 PROCEDURE — 1180000000 HC REHAB R&B

## 2020-02-21 PROCEDURE — 6370000000 HC RX 637 (ALT 250 FOR IP): Performed by: ORTHOPAEDIC SURGERY

## 2020-02-21 PROCEDURE — 97535 SELF CARE MNGMENT TRAINING: CPT

## 2020-02-21 PROCEDURE — 97110 THERAPEUTIC EXERCISES: CPT

## 2020-02-21 PROCEDURE — 80048 BASIC METABOLIC PNL TOTAL CA: CPT

## 2020-02-21 RX ADMIN — LINAGLIPTIN 5 MG: 5 TABLET, FILM COATED ORAL at 09:11

## 2020-02-21 RX ADMIN — PANTOPRAZOLE SODIUM 40 MG: 40 TABLET, DELAYED RELEASE ORAL at 06:11

## 2020-02-21 RX ADMIN — CITALOPRAM HYDROBROMIDE 10 MG: 10 TABLET ORAL at 09:11

## 2020-02-21 RX ADMIN — CARVEDILOL 12.5 MG: 12.5 TABLET, FILM COATED ORAL at 09:10

## 2020-02-21 RX ADMIN — POLYETHYLENE GLYCOL 3350 17 G: 17 POWDER, FOR SOLUTION ORAL at 09:10

## 2020-02-21 RX ADMIN — PRAMIPEXOLE DIHYDROCHLORIDE 1.5 MG: 1.5 TABLET ORAL at 20:23

## 2020-02-21 RX ADMIN — Medication 1 TABLET: at 20:23

## 2020-02-21 RX ADMIN — Medication 1 TABLET: at 09:11

## 2020-02-21 RX ADMIN — PANTOPRAZOLE SODIUM 40 MG: 40 TABLET, DELAYED RELEASE ORAL at 16:32

## 2020-02-21 RX ADMIN — BISACODYL 10 MG: 10 SUPPOSITORY RECTAL at 20:23

## 2020-02-21 RX ADMIN — RIVAROXABAN 10 MG: 10 TABLET, FILM COATED ORAL at 16:32

## 2020-02-21 RX ADMIN — FENOFIBRATE 160 MG: 160 TABLET ORAL at 06:11

## 2020-02-21 RX ADMIN — OXYCODONE HYDROCHLORIDE 10 MG: 5 TABLET ORAL at 03:56

## 2020-02-21 RX ADMIN — FERROUS SULFATE TAB 325 MG (65 MG ELEMENTAL FE) 325 MG: 325 (65 FE) TAB at 09:11

## 2020-02-21 RX ADMIN — OXYCODONE HYDROCHLORIDE 10 MG: 5 TABLET ORAL at 09:03

## 2020-02-21 RX ADMIN — OXYCODONE HYDROCHLORIDE 10 MG: 5 TABLET ORAL at 13:05

## 2020-02-21 RX ADMIN — SENNOSIDES AND DOCUSATE SODIUM 1 TABLET: 8.6; 5 TABLET ORAL at 20:23

## 2020-02-21 RX ADMIN — SENNOSIDES AND DOCUSATE SODIUM 1 TABLET: 8.6; 5 TABLET ORAL at 09:11

## 2020-02-21 RX ADMIN — ATORVASTATIN CALCIUM 40 MG: 40 TABLET, FILM COATED ORAL at 20:23

## 2020-02-21 RX ADMIN — OXYCODONE HYDROCHLORIDE 10 MG: 5 TABLET ORAL at 17:05

## 2020-02-21 ASSESSMENT — PAIN DESCRIPTION - PAIN TYPE
TYPE: ACUTE PAIN;SURGICAL PAIN
TYPE: CHRONIC PAIN;SURGICAL PAIN

## 2020-02-21 ASSESSMENT — PAIN DESCRIPTION - ORIENTATION
ORIENTATION: LOWER

## 2020-02-21 ASSESSMENT — PAIN SCALES - GENERAL
PAINLEVEL_OUTOF10: 7
PAINLEVEL_OUTOF10: 8
PAINLEVEL_OUTOF10: 6
PAINLEVEL_OUTOF10: 3
PAINLEVEL_OUTOF10: 8
PAINLEVEL_OUTOF10: 5
PAINLEVEL_OUTOF10: 6
PAINLEVEL_OUTOF10: 5
PAINLEVEL_OUTOF10: 0
PAINLEVEL_OUTOF10: 7

## 2020-02-21 ASSESSMENT — PAIN DESCRIPTION - LOCATION
LOCATION: BACK

## 2020-02-21 ASSESSMENT — PAIN DESCRIPTION - DESCRIPTORS
DESCRIPTORS: SHARP
DESCRIPTORS: ACHING;CONSTANT;DISCOMFORT

## 2020-02-21 NOTE — PROGRESS NOTES
[address] [facility]    ACUTE REHABILITATION OCCUPATIONAL THERAPY  DAILY NOTE    Date: 20  Patient Name: Breanna Graham      Room: 9244/4975-97    MRN: [id number]   : 1950  (72 y.o.)  Gender: female   Referring Practitioner: Dr. Rae/Dr. Jayne Beaver  Diagnosis: Neurological disorder; Lumbar L4-5 PLIF Vertebroplasty L4 & L5 2/10/20       Restrictions  Restrictions/Precautions: Fall Risk, Surgical Protocols  Implants present? : Metal implants  Other position/activity restrictions: up with assistance  Required Braces or Orthoses?: No  Equipment Used: Other, Wheelchair, Bed    Subjective  Subjective: Pt with decreased safety requring cues for safety during transfer and functional mobility   Patient Currently in Pain: Yes  Pain Level: 8  Pain Location: Back  Pain Orientation: Lower  Restrictions/Precautions: Fall Risk;Surgical Protocols  Overall Orientation Status: Within Functional Limits  Patient Observation  Observations: Pt impulsive t  Pain Assessment  Pain Assessment: 0-10  Pain Level: 8  Pain Type: Acute pain, Surgical pain  Pain Location: Back  Pain Orientation: Lower    Objective  Cognition  Overall Cognitive Status: Impaired  Arousal/Alertness: Delayed responses to stimuli  Following Directions: Follows two step commands  Attention Span: Attends with cues to redirect  Memory: Decreased short term memory;Decreased recall of recent events  Following Commands:  Follows multistep commands with repetition  Safety Judgement: Decreased awareness of need for safety  Awareness of Errors: Decreased awareness of errors  Insights: Decreased awareness of deficits  Sequencing and Organization: Assistance required to implement solutions;Assistance required to generate solutions;Assistance required to identify errors made    Balance  Sitting Balance: Supervision  Standing Balance: Stand by assistance(standing at sink for ADL )     Bed mobility  Supine to Sit: Minimal assistance  Transfers  Sit to stand: Stand by assistance  Stand to sit: Stand by assistance    Standing Balance  Time: 2 min x3, PM: 6 MIN  Activity: functional mobility, ADL, functional activity   Comment: 0-1 UE support      Functional Mobility  Functional - Mobility Device: Rolling Walker  Activity: To/from bathroom  Assist Level: Supervision    Toilet Transfers  Toilet - Technique: Ambulating  Equipment Used: Raised toilet seat with rails  Toilet Transfer: Stand by assistance  Toilet Transfers Comments: with RW using grab bars     Shower Transfers  Shower Transfers Comments: refused shower this date     Gross Motor: Laundry folding, standing functional activity           ADL  Feeding: Setup; Increased time to complete  Grooming: Supervision  UE Bathing: Supervision(seated at sink )  LE Bathing: Stand by assistance(seated at sink, standing for brice care )  UE Dressing: Supervision; Increased time to complete;Setup(seated , donning [place] shirt )  LE Dressing: Stand by assistance(using reacher for LBD, standing to pull up pants )  Toileting: Supervision     Light Housekeeping  Light Housekeeping Level: Wheelchair  Light Housekeeping Level of Assistance: Modified independent  Light Housekeeping: Laundry folding activity     Assessment  Performance deficits / Impairments: Decreased functional mobility ; Decreased ADL status; Decreased strength;Decreased safe awareness;Decreased endurance;Decreased cognition;Decreased balance;Decreased high-level IADLs;Decreased fine motor control;Decreased coordination;Decreased posture;Decreased sensation  Discharge Recommendations: Patient would benefit from continued therapy after discharge  Activity Tolerance: Patient Tolerated treatment well  Safety Devices in place: Yes  Type of devices: Left in chair;Call light within reach; Chair alarm in place          Patient Education:  Patient Goals   Patient goals :  \"To be able to do all the things I need to do\"   Education provided to pt on importance of safety and asking for assistance

## 2020-02-21 NOTE — PROGRESS NOTES
Physical Therapy  [address]  Acute Rehabilitation Physical Therapy Progress Note    Date: 20  Patient Name: Kannan Bliss       Room: 9128/0056-67  MRN: [id number]   Account: [de-identified]   : 1950  (72 y.o.) Gender: female     Referring Practitioner: Dr. Rae/Dr. Marvin Hagan  Diagnosis: Neurological disorder; Lumbar L4-5 PLIF Vertebroplasty L4 & L5 2/10/20  Past Medical History:  has a past medical history of Allergic rhinitis, cause unspecified, Back pain, Bowel obstruction (HCC), C. difficile diarrhea, CAD (coronary artery disease), Cellulitis, Cellulitis, Diverticulosis of colon (without mention of hemorrhage), GERD (gastroesophageal reflux disease), History of MI (myocardial infarction), History of ovarian cyst, History of peritonitis, HTN (hypertension), Hx of blood clots, Hyperlipidemia, Intestinal or peritoneal adhesions with obstruction (postoperative) (postinfection) ([address] Kidney infection, Lateral epicondylitis  of elbow, Muscle strain, Other abnormal glucose, PONV (postoperative nausea and vomiting), Pre-diabetes, Restless legs syndrome (RLS), TIA (transient ischemic attack), Vitamin D deficiency, and Wears glasses. Past Surgical History:   has a past surgical history that includes Ovary removal (); colectomy (); Appendectomy (); Ovary removal (); Hysterectomy (); Bunionectomy (Left); sinus surgery (); Colonoscopy; other surgical history (14); cyst removal (Right); Wrist fracture surgery (Left, 3/5/2019); Upper gastrointestinal endoscopy (N/A, 2019); Upper gastrointestinal endoscopy (N/A, 2019); Upper gastrointestinal endoscopy (N/A, 2019); Abdomen surgery (); Cardiac catheterization; Cardiac catheterization (); and lumbar fusion (N/A, 2/10/2020).   Additional Pertinent Hx: Kannan Bliss is 72 y.o.,  female, undergoing preadmission testing for Spondylolisthesis with scheduled Lumbar L4-L5 Posterior Decompression/ Fusion. Pt has lumbar degenerative disc disease. The initial symptoms started 4 years ago. 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 Pain described as constant, aching rating 5/10. Pain is aggravated with standing. Sitting relieves pain. Pt c/o of pain in the lumbar spine area, radiates to the lower limbs worse on the left side. Pt reports pain radiates down left thigh to knee and down right thigh causing tingling. Pt reports difficulty with ambulation due to back pain and \"balance issues\". Uses a cane for ambulation. Has sustained multiple falls with last fall being within last week. No redness, swelling or rashes. Pt has tried injections, physical therapy and NSAIDS. She can no longer take NSAIDS as she developed a bleeding ulcer and required a blood transfusion in August of 2018. Pt currently goes to pain management and has been on Percocet with minimal relief of back pain. Pt admitted to [facility] 2/12/20. Overall Orientation Status: Within Normal Limits  Restrictions/Precautions  Restrictions/Precautions: Fall Risk;Surgical Protocols  Required Braces or Orthoses?: No  Implants present? : Metal implants  Position Activity Restriction  Other position/activity restrictions: up with assistance    Subjective: Patient reports her  is not feeling well and he will not be in for family training today. States he is hoping to come in tomorrow. Comments: Slow movements and very guarded today. Vital Signs  Patient Currently in Pain: Denies  Pain Assessment: 0-10  Pain Level: 5  Pain Type: Acute pain;Surgical pain  Pain Location: Back  Pain Orientation: Lower  Non-Pharmaceutical Pain Intervention(s): Emotional support; Ambulation/Increased Activity  Response to Pain Intervention: Patient Satisfied        Patient Observation  Observations: Decreased safety awareness. Bed Mobility:   Rolling: Stand by assistance  Supine to Sit: Contact guard assistance  Sit to Supine: Minimal assistance  Comment:  Max cues for safety awareness during transfers. Transfers:  Sit to Stand: Contact guard assistance  Stand to sit: Contact guard assistance  Bed to Chair: Contact guard assistance     Squat Pivot Transfers: Contact guard assistance        Ambulation 1  Surface: level tile  Device: Rolling Walker  Assistance: Stand by assistance  Quality of Gait: Very slow tawanda and easily distracted. No LOB. Guarded movements. Gait Deviations: Slow Tawanda;Decreased step length;Decreased step height  Distance: 200 ft AM and PM;  Short distances within gym  Comments: Cues for upright posture and safety awareness. Stairs/Curb  Stairs?: Yes  Stairs  # Steps : 15  Stairs Height: 6\"  Rails: Bilateral(& 4\")  Device: No Device  Assistance: Stand by assistance  Comment: Demonstrated safe technique. Wheelchair Activities  Wheelchair Type: Standard  Wheelchair Cushion: Pressure Relieving            BALANCE Posture: Fair  Sitting - Static: Fair;+  Sitting - Dynamic: Fair;+  Standing - Static: Fair;-  Standing - Dynamic: Fair;-  Comments: Pt assessed standing with RW, Seated in WC    EXERCISES    Other exercises?: Yes  Other exercises 1: Seated bilat. LE ex 10-20x each, to pt tolerance: 2#/Lime green band  Other exercises 2: log rolling L/R x 4  Other exercises 3: Dynamic standing activity reaching below/outside of KAREEM ~2mins x 3  Other exercises 4: NuStep L4  x 10 mins. Other exercises 5: EOM sitting ~ 5mins   Other exercises 6: supine bilat.  LE ex, AROM, 15x each           Activity Tolerance: Patient limited by fatigue, Patient limited by endurance  PT Equipment Recommendations  Equipment Needed: No(Patient reports she has DME at home.)           Current Treatment Recommendations: Strengthening, Balance Training, Functional Mobility Training, Transfer Training, Endurance Training, Gait Training, Stair training, Home Exercise Program, Safety Education & Training, Patient/Caregiver Education & Training, Equipment

## 2020-02-21 NOTE — PROGRESS NOTES
Speech Language Pathology  [facility]  Cognitive Treatment Note    Date: 2/21/2020  Patients Name: Ren Diaz  MRN: [id number]  Diagnosis: Mild cognitive impairment  Patient Active Problem List   Diagnosis Code    Other specified disorders of rotator cuff syndrome of shoulder and allied disorders M75.100    Diverticulosis of large intestine K57.30    Intestinal or peritoneal adhesions with obstruction (postoperative) (postinfection) ([address] K56.50    Restless legs syndrome (RLS) G25.81    GERD (gastroesophageal reflux disease) K21.9    Essential hypertension I10    Mixed hyperlipidemia E78.2    Other abnormal glucose R73.09    Atherosclerosis I70.90    Lateral epicondylitis M77.10    Allergic rhinitis J30.9    Vitamin D deficiency E55.9    Depression F32.9    Degenerative joint disease (DJD) of hip M16.9    Peripheral edema R60.9    Injury of foot, left U74.294U    Facial droop R29.810    CVA (cerebral vascular accident) ([facility] I63.9    BIN (acute kidney injury) ([facility] N17.9    Bradycardia R00.1    Ataxia R27.0    Aphasia R47.01    TIA (transient ischemic attack) G45.9    Need for prophylactic vaccination and inoculation against cholera alone Z23    Chest pain R07.9    Osteopenia M85.80    Lumbago M54.5    Facet arthritis of lumbar region M47.816    Meralgia paresthetica of right side G57.11    Lumbar degenerative disc disease M51.36    Spondylosis of lumbar region without myelopathy or radiculopathy M47.816    Blood poisoning [id number]    Cellulitis of left lower extremity L03. 80    Encounter for medication monitoring Z51.81    Deep vein thrombosis (DVT) of right lower extremity (HCC) I82.401    Lumbar facet arthropathy M47.816    Shortness of breath R06.02    Chronic deep vein thrombosis (DVT) of proximal vein of both lower extremities (HCC) I82.5Y3    Chronic diastolic heart failure (HCC) I50.32    Neurogenic claudication due to lumbar spinal stenosis M48.062   

## 2020-02-21 NOTE — DISCHARGE SUMMARY
Discharge Summary    Attending Physician: No att. providers found  Admit Date: 2/10/2020  Discharge Date: 2/12/2020   Primary Care Physician: Amado Parra MD    Admitting Diagnosis:  Active Problems:    GERD (gastroesophageal reflux disease)    Essential hypertension    Bradycardia    Lumbar degenerative disc disease    Lumbar facet arthropathy    Chronic diastolic heart failure (HCC)    Chronic deep vein thrombosis (DVT) of popliteal vein of right lower extremity (HCC)    B12 deficiency    Acute blood loss anemia    Acquired spondylolisthesis    Spinal stenosis of lumbar region with neurogenic claudication    Back pain  Resolved Problems:    * No resolved hospital problems. *        Discharge Diagnoses: Active Problems:    GERD (gastroesophageal reflux disease)    Essential hypertension    Bradycardia    Lumbar degenerative disc disease    Lumbar facet arthropathy    Chronic diastolic heart failure (HCC)    Chronic deep vein thrombosis (DVT) of popliteal vein of right lower extremity (HCC)    B12 deficiency    Acute blood loss anemia    Acquired spondylolisthesis    Spinal stenosis of lumbar region with neurogenic claudication    Back pain  Resolved Problems:    * No resolved hospital problems.  *         Past Medical History:   Diagnosis Date    Allergic rhinitis, cause unspecified     Back pain     lumbar    Bowel obstruction (HCC)     history of due to scar tissue, resolved non-surgically    C. difficile diarrhea     CAD (coronary artery disease)     Cellulitis     left leg    Cellulitis 2017 August    leg left leg/bug bite    Diverticulosis of colon (without mention of hemorrhage)     GERD (gastroesophageal reflux disease)     History of MI (myocardial infarction) 2005    thought due to a blood clot    History of ovarian cyst 1970    had oopherectomy    History of peritonitis 1968    due to ruptured appendix age 13    HTN (hypertension)     Hx of blood clots     right leg    Hyperlipidemia    

## 2020-02-21 NOTE — PROGRESS NOTES
Physical Medicine & Rehabilitation  Progress Note      Subjective:      72year-old female with lumbar spinal stenosis s/p L4-5 posterior decompression/fusion and vertebroplasty L4 and L5. Patient is well, and has had no acute complaints or problems    ROS:  Denies fevers, chills, sweats. No chest pain, palpitations, lightheadedness. Denies coughing, wheezing or shortness of breath. Denies abdominal pain, nausea, diarrhea or constipation. No new areas of joint pain. Denies new areas of numbness or weakness. Denies new anxiety or depression issues. No new skin problems. Rehabilitation:   Progressing in therapies. PT:  Restrictions/Precautions: Fall Risk, Surgical Protocols  Implants present? : Metal implants  Other position/activity restrictions: up with assistance   Transfers  Sit to Stand: Contact guard assistance  Stand to sit: Contact guard assistance  Bed to Chair: Contact guard assistance(RW)  Stand Pivot Transfers: Contact guard assistance(no device, W/C to bed)  Squat Pivot Transfers: Minimal Assistance  Comment: Constant VC for proper WC alignment with education of safety risks for mobility outside of KAREEM of RW. Ambulation 1  Surface: level tile  Device: Rolling Walker  Other Apparatus: (IV pole)  Assistance: Stand by assistance  Quality of Gait: No LOB; slow pace. Gait Deviations: Slow Alicia, Decreased step length, Decreased step height  Distance: 139ft AM/PM, short distances in room and gym  Comments: VCs for increasing step lengthwith good return from pt     Transfers  Sit to Stand: Contact guard assistance  Stand to sit: Contact guard assistance  Bed to Chair: Contact guard assistance(RW)  Stand Pivot Transfers: Contact guard assistance(no device, W/C to bed)  Squat Pivot Transfers: Minimal Assistance  Comment: Constant VC for proper WC alignment with education of safety risks for mobility outside of KAREEM of .    Ambulation  Ambulation?: Yes  More Ambulation?: Yes  Ambulation assistance  Sit to Supine: Contact guard assistance  Scooting: Maximal assistance(pt denies ability to scoot HOB perform without Max x2 (bed flat))  Comment: VC for proper log rolling technique. Pt completes bed mobility with HOB flat and use of HR and on mat with wedge and pillow x3  Transfers  Stand Step Transfers: Supervision  Stand Pivot Transfers: Supervision  Sit to stand: Stand by assistance  Stand to sit: Stand by assistance  Transfer Comments: Min VCs for hand placement with Fair return   Toilet Transfers  Toilet - Technique: Ambulating  Equipment Used: Raised toilet seat with rails  Toilet Transfer: Stand by assistance  Toilet Transfers Comments: with RW      Shower Transfers  Shower - Transfer From: Walker  Shower - Transfer Type: To and From  Shower - Transfer To: Transfer tub bench  Shower - Technique: Ambulating  Shower Transfers: Minimal assistance  Shower Transfers Comments: refused shower this date   Wheelchair Bed Transfers  Wheelchair/Bed - Technique: Stand pivot, To right  Equipment Used: Other, Wheelchair, Bed  Level of Asssistance: Minimal assistance    SPEECH:  Subjective: [x]? Alert     [x]? Cooperative     []? Confused     []? Agitated    []?  Lethargic        Objective/Assessment:     Attention: Sustained throughout, distractions minimized      Recall:   Paragraph retention 88% immediate recall, improving to 100% with mod cues  Working memory exercise (mental manipulation scrambled sentences, 4 unit immediate recall) 70% accuracy sadiq, improving to 90% with mod verbal cues provided.      Problem Solving/Reasoning:   Sequential thought (household sequences, 4 step) 50% accuracy sadiq, improving with max cues provided     Other:   No family present during session     Objective:  BP (!) 133/53   Pulse 63   Temp 98.4 °F (36.9 °C) (Oral)   Resp 16   Ht 5' 3\" (1.6 m)   Wt 167 lb (75.8 kg)   SpO2 98%   BMI 29.58 kg/m²       GEN: well developed, well nourished, NAD  HEENT: NCAT, PERRL, EOMI, PRN  atorvastatin (LIPITOR) tablet 40 mg, 40 mg, Oral, Nightly  calcium carbonate-vitamin D (CALTRATE) 600-400 MG-UNIT per tab 1 tablet, 1 tablet, Oral, BID  carvedilol (COREG) tablet 12.5 mg, 12.5 mg, Oral, BID  citalopram (CELEXA) tablet 10 mg, 10 mg, Oral, Daily  fenofibrate tablet 160 mg, 160 mg, Oral, QAM AC  ferrous sulfate tablet 325 mg, 325 mg, Oral, Daily with breakfast  linagliptin (TRADJENTA) tablet 5 mg, 5 mg, Oral, Daily  magnesium hydroxide (MILK OF MAGNESIA) 400 MG/5ML suspension 30 mL, 30 mL, Oral, Daily PRN  nitroGLYCERIN (NITROSTAT) SL tablet 0.4 mg, 0.4 mg, Sublingual, Q5 Min PRN  ondansetron (ZOFRAN) tablet 4 mg, 4 mg, Oral, Daily PRN  pantoprazole (PROTONIX) tablet 40 mg, 40 mg, Oral, BID AC  pramipexole (MIRAPEX) tablet 1.5 mg, 1.5 mg, Oral, Daily  polyethylene glycol (GLYCOLAX) packet 17 g, 17 g, Oral, Daily  bisacodyl (DULCOLAX) suppository 10 mg, 10 mg, Rectal, Daily PRN  sennosides-docusate sodium (SENOKOT-S) 8.6-50 MG tablet 1 tablet, 1 tablet, Oral, BID  lidocaine (XYLOCAINE) 5 % ointment, , Topical, PRN      Impression/Plan:   Impaired ADLs, gait, and mobility due to:      1. Lumbar stenosis s/p L4-5 posterior fusion and vertebroplasty L4 and L5 on 2/10: PT/OT  for gait, mobility, strengthening, endurance, ADLs, and self care. Has oxycodone, lidocaine for pain  2. HTN/Hyperlipidemia: on Coreg, Lasix, hydralazine,  nitro prn, lipitor. IM discontinued Lisinopril and is monitoring renal function. Creatinine mildly improved today. 3. Depression: on Celexa  4. Impaired cognition: speech therapy treating  5. Blood loss anemia: s/p IV iron post op. stable  6. Mild leukocytosis: Resolved. WBC WNL  7. DM: on tradjenta  8. GERD: Hx GI bleed, on protonix  9. Internal medicine for medical management  10. DVT prophylaxis/chronic R gastroc DVT:  Stable on doppler 2/19. Started on Xarelto 2/18 as per Dr. Wyatt Clark.     Electronically signed by Chino Mahajan MD on 2/21/2020 at 9:37 AM      This note is created with the assistance of a speech recognition program.  While intending to generate a document that actually reflects the content of the visit, the document can still have some errors including those of syntax and sound a like substitutions which may escape proof reading. In such instances, actual meaning can be extrapolated by contextual diversion.

## 2020-02-22 LAB
ANION GAP SERPL CALCULATED.3IONS-SCNC: 12 MMOL/L (ref 9–17)
BUN BLDV-MCNC: 33 MG/DL (ref 8–23)
BUN/CREAT BLD: ABNORMAL (ref 9–20)
CALCIUM SERPL-MCNC: 9.3 MG/DL (ref 8.6–10.4)
CHLORIDE BLD-SCNC: 101 MMOL/L (ref 98–107)
CO2: 27 MMOL/L (ref 20–31)
CREAT SERPL-MCNC: 1.32 MG/DL (ref 0.5–0.9)
GFR AFRICAN AMERICAN: 48 ML/MIN
GFR NON-AFRICAN AMERICAN: 40 ML/MIN
GFR SERPL CREATININE-BSD FRML MDRD: ABNORMAL ML/MIN/{1.73_M2}
GFR SERPL CREATININE-BSD FRML MDRD: ABNORMAL ML/MIN/{1.73_M2}
GLUCOSE BLD-MCNC: 107 MG/DL (ref 70–99)
GLUCOSE BLD-MCNC: 114 MG/DL (ref 65–105)
GLUCOSE BLD-MCNC: 114 MG/DL (ref 65–105)
GLUCOSE BLD-MCNC: 87 MG/DL (ref 65–105)
GLUCOSE BLD-MCNC: 95 MG/DL (ref 65–105)
POTASSIUM SERPL-SCNC: 5 MMOL/L (ref 3.7–5.3)
SODIUM BLD-SCNC: 140 MMOL/L (ref 135–144)

## 2020-02-22 PROCEDURE — 6370000000 HC RX 637 (ALT 250 FOR IP): Performed by: ORTHOPAEDIC SURGERY

## 2020-02-22 PROCEDURE — 97530 THERAPEUTIC ACTIVITIES: CPT

## 2020-02-22 PROCEDURE — 36415 COLL VENOUS BLD VENIPUNCTURE: CPT

## 2020-02-22 PROCEDURE — 97116 GAIT TRAINING THERAPY: CPT

## 2020-02-22 PROCEDURE — 6370000000 HC RX 637 (ALT 250 FOR IP): Performed by: PHYSICAL MEDICINE & REHABILITATION

## 2020-02-22 PROCEDURE — 80048 BASIC METABOLIC PNL TOTAL CA: CPT

## 2020-02-22 PROCEDURE — 6370000000 HC RX 637 (ALT 250 FOR IP): Performed by: INTERNAL MEDICINE

## 2020-02-22 PROCEDURE — 99231 SBSQ HOSP IP/OBS SF/LOW 25: CPT | Performed by: INTERNAL MEDICINE

## 2020-02-22 PROCEDURE — 1180000000 HC REHAB R&B

## 2020-02-22 PROCEDURE — 99232 SBSQ HOSP IP/OBS MODERATE 35: CPT | Performed by: PHYSICAL MEDICINE & REHABILITATION

## 2020-02-22 PROCEDURE — 97110 THERAPEUTIC EXERCISES: CPT

## 2020-02-22 PROCEDURE — 82947 ASSAY GLUCOSE BLOOD QUANT: CPT

## 2020-02-22 RX ADMIN — PRAMIPEXOLE DIHYDROCHLORIDE 1.5 MG: 1.5 TABLET ORAL at 20:42

## 2020-02-22 RX ADMIN — SENNOSIDES AND DOCUSATE SODIUM 1 TABLET: 8.6; 5 TABLET ORAL at 07:52

## 2020-02-22 RX ADMIN — PANTOPRAZOLE SODIUM 40 MG: 40 TABLET, DELAYED RELEASE ORAL at 17:09

## 2020-02-22 RX ADMIN — FERROUS SULFATE TAB 325 MG (65 MG ELEMENTAL FE) 325 MG: 325 (65 FE) TAB at 07:51

## 2020-02-22 RX ADMIN — Medication 1 TABLET: at 20:42

## 2020-02-22 RX ADMIN — FENOFIBRATE 160 MG: 160 TABLET ORAL at 05:31

## 2020-02-22 RX ADMIN — RIVAROXABAN 10 MG: 10 TABLET, FILM COATED ORAL at 17:09

## 2020-02-22 RX ADMIN — PANTOPRAZOLE SODIUM 40 MG: 40 TABLET, DELAYED RELEASE ORAL at 05:31

## 2020-02-22 RX ADMIN — CITALOPRAM HYDROBROMIDE 10 MG: 10 TABLET ORAL at 07:50

## 2020-02-22 RX ADMIN — SENNOSIDES AND DOCUSATE SODIUM 1 TABLET: 8.6; 5 TABLET ORAL at 20:42

## 2020-02-22 RX ADMIN — Medication 1 TABLET: at 07:52

## 2020-02-22 RX ADMIN — LINAGLIPTIN 5 MG: 5 TABLET, FILM COATED ORAL at 07:52

## 2020-02-22 RX ADMIN — POLYETHYLENE GLYCOL 3350 17 G: 17 POWDER, FOR SOLUTION ORAL at 07:51

## 2020-02-22 RX ADMIN — OXYCODONE HYDROCHLORIDE 10 MG: 5 TABLET ORAL at 17:09

## 2020-02-22 RX ADMIN — CARVEDILOL 12.5 MG: 12.5 TABLET, FILM COATED ORAL at 07:51

## 2020-02-22 RX ADMIN — ATORVASTATIN CALCIUM 40 MG: 40 TABLET, FILM COATED ORAL at 20:42

## 2020-02-22 RX ADMIN — OXYCODONE HYDROCHLORIDE 10 MG: 5 TABLET ORAL at 10:09

## 2020-02-22 RX ADMIN — OXYCODONE HYDROCHLORIDE 10 MG: 5 TABLET ORAL at 02:08

## 2020-02-22 ASSESSMENT — PAIN SCALES - GENERAL
PAINLEVEL_OUTOF10: 7
PAINLEVEL_OUTOF10: 6
PAINLEVEL_OUTOF10: 2
PAINLEVEL_OUTOF10: 5
PAINLEVEL_OUTOF10: 7
PAINLEVEL_OUTOF10: 6
PAINLEVEL_OUTOF10: 0

## 2020-02-22 ASSESSMENT — PAIN DESCRIPTION - LOCATION
LOCATION: BACK

## 2020-02-22 ASSESSMENT — PAIN DESCRIPTION - PROGRESSION
CLINICAL_PROGRESSION: GRADUALLY IMPROVING
CLINICAL_PROGRESSION: GRADUALLY IMPROVING

## 2020-02-22 ASSESSMENT — PAIN DESCRIPTION - DIRECTION
RADIATING_TOWARDS: TAILBONE
RADIATING_TOWARDS: TAILBONE

## 2020-02-22 ASSESSMENT — PAIN DESCRIPTION - PAIN TYPE
TYPE: SURGICAL PAIN

## 2020-02-22 ASSESSMENT — PAIN DESCRIPTION - FREQUENCY
FREQUENCY: CONTINUOUS

## 2020-02-22 ASSESSMENT — PAIN DESCRIPTION - ORIENTATION
ORIENTATION: LOWER

## 2020-02-22 ASSESSMENT — PAIN DESCRIPTION - ONSET
ONSET: ON-GOING

## 2020-02-22 ASSESSMENT — PAIN DESCRIPTION - DESCRIPTORS
DESCRIPTORS: ACHING;DISCOMFORT
DESCRIPTORS: ACHING
DESCRIPTORS: ACHING

## 2020-02-22 NOTE — PLAN OF CARE
Problem: Risk for Impaired Skin Integrity  Goal: Tissue integrity - skin and mucous membranes  Description  Structural intactness and normal physiological function of skin and  mucous membranes. Outcome: Ongoing  Note:   Skin remains intact. Surgical incision is clean, dry, and intact. Loulou Holloway in place. Will continue to assess. Problem: Falls - Risk of:  Goal: Will remain free from falls  Description  Will remain free from falls  Outcome: Ongoing  Note:   Patient remains free from falls. Call light remains within reach, non-skid slippers remained on patient, hourly rounding throughout shift. Problem: Pain:  Goal: Control of acute pain  Description  Control of acute pain  Outcome: Ongoing  Note:   Pain is controlled with the use of prn pain meds. Will continue to assess.

## 2020-02-22 NOTE — PLAN OF CARE
Problem: Risk for Impaired Skin Integrity  Goal: Tissue integrity - skin and mucous membranes  Description  Structural intactness and normal physiological function of skin and  mucous membranes. Outcome: Ongoing  Note:   Skin assessment completed this shift. Nutrition and Hydration status assessed with adequate intake. Roger Score as charted. Bilateral heels remain elevated on pillows throughout the shift. Patient able to reposition self for comfort and to prevent breakdown. Patient verbalizes understanding of pressure ulcer prevention measures. Skin integrity maintained. No new skin breakdown noted. Skin to high risk pressure areas including coccyx and heels are clear. Staples are intact to back incision with no S/S of infection noted. Island dressing C,D, and I to back. Problem: Falls - Risk of:  Goal: Will remain free from falls  Description  Will remain free from falls  Outcome: Ongoing  Note:   No falls or injuries sustained at this time. No attempts to get out of bed without nursing assistance. Call light within reach and pt. uses appropriately for assistance. Siderails up x 2. Nonskid footwear remains on. Bed in low and locked position. Hourly nursing rounds made. Pt. Alert and oriented, aware of limitations, and exhibits good safety judgement. Pt. uses assistive devices appropriately. Pt. understands individual fall risk factors. Pt. reoriented to surroundings and reminded to use call light with each nurse/patient interaction. Bed alarm remains engaged throughout the shift as a precaution. Problem: Pain:  Goal: Control of acute pain  Description  Control of acute pain  Outcome: Ongoing  Note:   Pain assessment and reassessment completed so ar this shift. Pt. able to rest after the use of pain medications. Patient medicated with Tamera 5-10mg Q4hrs for c/o pain lower back. Patient relates a tolerable level of discomfort with the current medication.    Pt. Repositions per self for comfort. Nonverbal cues indicate pain relief. Pt. Rests quietly with eyes closed after pain medication administration. Respirations easy and unlabored. Appears free from distress.

## 2020-02-22 NOTE — PROGRESS NOTES
Physical Therapy  [address]  Acute Rehabilitation Physical Therapy Progress Note    Date: 20  Patient Name: Raimundo Lundberg       Room: 2481/8536-43  MRN: [id number]   Account: [de-identified]   : 1950  (76 y.o.) Gender: female     Referring Practitioner: Dr. Rae/Dr. Bhargav Varela  Diagnosis: Neurological disorder; Lumbar L4-5 PLIF Vertebroplasty L4 & L5 2/10/20  Past Medical History:  has a past medical history of Allergic rhinitis, cause unspecified, Back pain, Bowel obstruction (HCC), C. difficile diarrhea, CAD (coronary artery disease), Cellulitis, Cellulitis, Diverticulosis of colon (without mention of hemorrhage), GERD (gastroesophageal reflux disease), History of MI (myocardial infarction), History of ovarian cyst, History of peritonitis, HTN (hypertension), Hx of blood clots, Hyperlipidemia, Intestinal or peritoneal adhesions with obstruction (postoperative) (postinfection) ([address] Kidney infection, Lateral epicondylitis  of elbow, Muscle strain, Other abnormal glucose, PONV (postoperative nausea and vomiting), Pre-diabetes, Restless legs syndrome (RLS), TIA (transient ischemic attack), Vitamin D deficiency, and Wears glasses. Past Surgical History:   has a past surgical history that includes Ovary removal (); colectomy (); Appendectomy (); Ovary removal (); Hysterectomy (); Bunionectomy (Left); sinus surgery (); Colonoscopy; other surgical history (14); cyst removal (Right); Wrist fracture surgery (Left, 3/5/2019); Upper gastrointestinal endoscopy (N/A, 2019); Upper gastrointestinal endoscopy (N/A, 2019); Upper gastrointestinal endoscopy (N/A, 2019); Abdomen surgery (); Cardiac catheterization; Cardiac catheterization (); and lumbar fusion (N/A, 2/10/2020).   Additional Pertinent Hx: Raimundo Lundberg is 72 y.o.,  female, undergoing preadmission testing for Spondylolisthesis with scheduled Lumbar L4-L5 Posterior Decompression/

## 2020-02-22 NOTE — PROGRESS NOTES
Physical Medicine & Rehabilitation  Progress Note      Subjective:      72year-old female with lumbar spinal stenosis s/p L4-5 posterior decompression/fusion and vertebroplasty L4 and L5. Patient is well, and has had no acute complaints or problems. ROS:  Denies fevers, chills, sweats. No chest pain, palpitations, lightheadedness. Denies coughing, wheezing or shortness of breath. Denies abdominal pain, nausea, diarrhea or constipation. No new areas of joint pain. Denies new areas of numbness or weakness. Denies new anxiety or depression issues. No new skin problems. Rehabilitation:   Progressing in therapies. PT:  Restrictions/Precautions: Fall Risk, Surgical Protocols  Implants present? : Metal implants  Other position/activity restrictions: up with assistance   Transfers  Sit to Stand: Contact guard assistance  Stand to sit: Contact guard assistance  Bed to Chair: Contact guard assistance  Stand Pivot Transfers: Contact guard assistance  Squat Pivot Transfers: Contact guard assistance  Comment: Constant VC for proper WC alignment with education of safety risks for mobility outside of KAREEM of RW. Ambulation 1  Surface: level tile  Device: Rolling Walker  Other Apparatus: (IV pole)  Assistance: Stand by assistance  Quality of Gait: Very slow tawanda and easily distracted. No LOB. Guarded movements. Gait Deviations: Slow Tawanda, Decreased step length, Decreased step height  Distance: 200 ft AM and PM;  Short distances within gym  Comments: Cues for upright posture and safety awareness. Transfers  Sit to Stand: Contact guard assistance  Stand to sit: Contact guard assistance  Bed to Chair: Contact guard assistance  Stand Pivot Transfers: Contact guard assistance  Squat Pivot Transfers: Contact guard assistance  Comment: Constant VC for proper WC alignment with education of safety risks for mobility outside of KAREEM of RW.    Ambulation  Ambulation?: Yes  More Ambulation?: Yes  Ambulation 1  Surface: level tile  Device: Rolling Walker  Other Apparatus: (IV pole)  Assistance: Stand by assistance  Quality of Gait: Very slow tawanda and easily distracted. No LOB. Guarded movements. Gait Deviations: Slow Tawanda, Decreased step length, Decreased step height  Distance: 200 ft AM and PM;  Short distances within gym  Comments: Cues for upright posture and safety awareness. Surface: level tile  Ambulation 1  Surface: level tile  Device: Rolling Walker  Other Apparatus: (IV pole)  Assistance: Stand by assistance  Quality of Gait: Very slow tawanda and easily distracted. No LOB. Guarded movements. Gait Deviations: Slow Tawanda, Decreased step length, Decreased step height  Distance: 200 ft AM and PM;  Short distances within gym  Comments: Cues for upright posture and safety awareness. OT:  ADL  Feeding: Setup, Increased time to complete  Grooming: Supervision  UE Bathing: Supervision(seated at sink )  LE Bathing: Stand by assistance(seated at sink, standing for brice care )  UE Dressing: Supervision, Increased time to complete, Setup(seated , donning [place] shirt )  LE Dressing: Stand by assistance(using reacher for LBD, standing to pull up pants )  Toileting: Supervision  Additional Comments: Pt engaged in showering routine this date. Bandage over lumbar incision and IV on L wrist covered with plastic during shower to keep dry. Pt requires Min/Mod VCs for safety and attention to task.    Instrumental ADL's  Instrumental ADLs: Yes     Balance  Sitting Balance: Supervision  Standing Balance: Stand by assistance(standing at sink for ADL )   Standing Balance  Time: 2 min x3, PM: 6 MIN  Activity: functional mobility, ADL, functional activity   Comment: 0-1 UE support   Functional Mobility  Functional - Mobility Device: Rolling Walker  Activity: To/from bathroom  Assist Level: Supervision  Functional Mobility Comments: ambulating around gift shop and back to acute rehab      Bed mobility  Bridging: Stand by assistance  Rolling to Left: Stand by assistance  Rolling to Right: Stand by assistance  Supine to Sit: Minimal assistance  Sit to Supine: Contact guard assistance  Scooting: Maximal assistance(pt denies ability to scoot HOB perform without Max x2 (bed flat))  Comment: VC for proper log rolling technique. Pt completes bed mobility with HOB flat and use of HR and on mat with wedge and pillow x3  Transfers  Stand Step Transfers: Supervision  Stand Pivot Transfers: Supervision  Sit to stand: Stand by assistance  Stand to sit: Stand by assistance  Transfer Comments: Min VCs for hand placement with Fair return   Toilet Transfers  Toilet - Technique: Ambulating  Equipment Used: Raised toilet seat with rails  Toilet Transfer: Stand by assistance  Toilet Transfers Comments: with RW      Shower Transfers  Shower - Transfer From: Walker  Shower - Transfer Type: To and From  Shower - Transfer To: Transfer tub bench  Shower - Technique: Ambulating  Shower Transfers: Minimal assistance  Shower Transfers Comments: refused shower this date   Wheelchair Bed Transfers  Wheelchair/Bed - Technique: Stand pivot, To right  Equipment Used: Other, Wheelchair, Bed  Level of Asssistance: Minimal assistance    SPEECH:      Objective:  BP (!) 150/65   Pulse 62   Temp 98.3 °F (36.8 °C) (Oral)   Resp 20   Ht 5' 3\" (1.6 m)   Wt 167 lb (75.8 kg)   SpO2 97%   BMI 29.58 kg/m²       GEN: well developed, well nourished, NAD  HEENT: NCAT, PERRL, EOMI, mucous membranes pink and moist  CV: RRR, no murmurs, rubs or gallops  PULM: CTAB, no rales or rhonchi. Respirations WNL and unlabored  ABD: soft, NT, ND, BS+ and equal  NEURO: A&O x3. Sensation intact to light touch. MSK: Functional ROM all extremities. Strength 4+/5 key muscles BUEs. Strength 4/5 key muscles BLEs, 4+/5 key muscles BUEs. EXTREMITIES: No calf tenderness to palpation bilaterally.  No edema BLEs  SKIN: warm dry and intact with good turgor except healing lumbar incision with

## 2020-02-22 NOTE — PROGRESS NOTES
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, 1.5 pounds    APPENDECTOMY  1968    appendix ruptured, developed peritonitis    BUNIONECTOMY Left     along with calcium deposits removed   R [address]  2005    negative    COLECTOMY  1969    12 INCHES REMOVED D/T OBSTRUCTION    COLONOSCOPY      CYST REMOVAL Right     right facial    HYSTERECTOMY  1973    taken as a result of recurring cysts    LUMBAR FUSION N/A 2/10/2020    LUMBAR L4-5 POSTERIOR  DECOMPRESSION INSTRUMENTATION FUSION WCEMENT AUGMENTATION/ performed by Romaine Whitley MD at [facility]  8/14/14    FESS    OVARY REMOVAL  1970    UNILATERAL due to cyst    OVARY REMOVAL  1971    partial, due to cyst    SINUS SURGERY  2004    UPPER GASTROINTESTINAL ENDOSCOPY N/A 5/31/2019    EGD ESOPHAGOGASTRODUODENOSCOPY performed by Val Hardy MD at [facility] 8/5/2019    EGD BIOPSY performed by Nery Olivares MD at [place] [address] N/A 8/23/2019    EGD BIOPSY performed by Val Hardy MD at [address] [place] [address] 3/5/2019    WRIST OPEN REDUCTION INTERNAL FIXATION performed by Mara Almaraz MD at [address]        Medications Prior to Admission:     Prior to Admission medications    Medication Sig Start Date End Date Taking?  Authorizing Provider   furosemide (LASIX) 40 MG tablet Take 1 tablet by mouth daily 1/20/20  Yes Amanda Reyez MD   ferrous sulfate 325 (65 Fe) MG tablet Take 1 tablet by mouth daily (with breakfast) 1/3/20  Yes Carlos Manuel Thakkar MD   VITAMIN D, ERGOCALCIFEROL, PO Take by mouth   Yes Historical Provider, MD   hydrALAZINE (APRESOLINE) 50 MG tablet Take 1 tablet by mouth 3 times daily 1/2/20  Yes Marika Beltran MD   citalopram (CELEXA) 10 MG tablet Take 1 tablet by mouth daily 1/2/20  Yes Marika Beltran MD   fenofibrate micronized (LOFIBRA) 134 MG capsule Take 1 capsule by mouth every morning (before breakfast) 11/26/19  Yes Marika Beltran MD   lisinopril (PRINIVIL;ZESTRIL) 10 MG tablet Take 1 tablet by mouth daily 11/26/19  Yes Marika Beltran MD   pramipexole (MIRAPEX) 1 MG tablet Take 1.5 tablets by mouth daily 11/26/19  Yes Marika Beltran MD   atorvastatin (LIPITOR) 80 MG tablet Take 0.5 tablets by mouth nightly 11/11/19  Yes Alisha Johnson MD   pantoprazole (PROTONIX) 40 MG tablet Take 1 tablet by mouth 2 times daily (before meals) 11/11/19  Yes Alisha Johnson MD   SITagliptin (JANUVIA) 100 MG tablet Take 0.5 tablets by mouth daily 10/10/19  Yes Shy Schwartz MD   carvedilol (COREG) 12.5 MG tablet Take 1 tablet by mouth 2 times daily 8/13/19  Yes MAIK Agustin - CNP   Calcium Carbonate-Vitamin D (CALCIUM 500/D) 500-125 MG-UNIT TABS Take 1 tablet by mouth 2 times daily    Yes Historical Provider, MD   Lancets MISC 1 each by Does not apply route daily 10/10/19   Billie Jasso MD   blood glucose monitor strips Test 2 times a day & as needed for symptoms of irregular blood glucose. 10/10/19   Billie Jasso MD   ondansetron (ZOFRAN) 4 MG tablet Take 1 tablet by mouth daily as needed for Nausea or Vomiting 8/19/19   Billie Jasso MD   lidocaine (XYLOCAINE) 5 % ointment 4-5 times a day to your right thigh for pain. 3/15/19   Karsten Schwarz MD   nitroGLYCERIN (NITROSTAT) 0.4 MG SL tablet Place 1 tablet under the tongue every 5 minutes as needed for Chest pain 1/8/18   Billie Jasso MD        Allergies:     Mobic [meloxicam]; Bactrim [sulfamethoxazole-trimethoprim];  Codeine; and Seasonal    Social History:     Tobacco:    reports that she quit smoking about 2 years ago. Her smoking use included cigarettes. She started smoking about 24 years ago. She has a 10.00 pack-year smoking history. She has never used smokeless tobacco.  Alcohol:      reports no history of alcohol use. Drug Use:  reports no history of drug use. Family History:     Family History   Problem Relation Age of Onset    Stroke Mother     Diabetes Mother     Heart Disease Mother     High Blood Pressure Mother     Heart Disease Father     Heart Disease Brother     High Blood Pressure Brother     Heart Disease Maternal Grandmother     High Blood Pressure Sister        Review of Systems:     Positive and Negative as described in HPI. Constitutional: Negative for chills, fatigue, fever and unexpected weight change. HENT: Negative for ear discharge, facial swelling, nosebleeds, sore throat, trouble swallowing and voice change. Eyes: Negative for redness and visual disturbance. Respiratory: Negative for cough, shortness of breath and wheezing. Cardiovascular: Negative for chest pain, palpitations and leg swelling. Gastrointestinal: Negative for abdominal pain, blood in stool, diarrhea and vomiting. Genitourinary: Negative for difficulty urinating, dysuria and flank pain. Musculoskeletal: Back pain and leg weakness  Skin: Negative for color change and rash. Neurological: Negative for dizziness, seizures, syncope and headaches. Hematological: Negative for adenopathy. Does not bruise/bleed easily. Physical Exam:     BP (!) 150/65   Pulse 62   Temp 98.3 °F (36.8 °C) (Oral)   Resp 20   Ht 5' 3\" (1.6 m)   Wt 167 lb (75.8 kg)   SpO2 97%   BMI 29.58 kg/m²   Temp (24hrs), Av °F (36.7 °C), Min:97.7 °F (36.5 °C), Max:98.3 °F (36.8 °C)      General appearance:  alert, cooperative and no distress  Eyes: Anicteric sclera. Pupils are equally round and reactive to light. Extraocular movements are intact.   Lungs: clear to auscultation bilaterally, normal effort  Heart:  regular rate and rhythm, no murmur  Abdomen:  soft, nontender, nondistended, normal bowel sounds, no masses, hepatomegaly, splenomegaly  Extremities:  no edema, redness, tenderness in the calves  Skin:  no gross lesions, rashes, induration  Neuro:  Alert, oriented X 3,  Non-focal.    Investigations:      Laboratory Testing:  Lab Results   Component Value Date    WBC 8.2 02/21/2020    HGB 10.0 (L) 02/21/2020    HCT 29.7 (L) 02/21/2020    MCV 93.8 02/21/2020     (H) 02/21/2020     Lab Results   Component Value Date     02/22/2020    K 5.0 02/22/2020     02/22/2020    CO2 27 02/22/2020    BUN 33 02/22/2020    CREATININE 1.32 02/22/2020    GLUCOSE 107 02/22/2020    CALCIUM 9.3 02/22/2020         Assessment :      Primary Problem  Lumbar degenerative disc disease    Active Hospital Problems    Diagnosis Date Noted    Neurological disorder [G98.8] 02/12/2020    Iron deficiency anemia secondary to inadequate dietary iron intake [D50.8] 05/23/2019    Type 2 diabetes mellitus with circulatory disorder, without long-term current use of insulin (HCC) [E11.59] 03/18/2019    Stage 3 chronic kidney disease (HCC) [N18.3] 03/18/2019    Chronic deep vein thrombosis (DVT) of popliteal vein of right lower extremity (HCC) [I82.531] 03/18/2019    Chronic deep vein thrombosis (DVT) of proximal vein of both lower extremities (HCC) [I82.5Y3] 01/29/2018    Lumbar degenerative disc disease [M51.36]     Essential hypertension [I10]     GERD (gastroesophageal reflux disease) [K21.9]        Plan:     1. Hypertension, well controlled, continue with Coreg, lisinopril. 2. Diabetes type 2 on Tradjenta, low-dose sliding scale-blood glucose well controlled. 3. Hyperlipidemia-continue Lipitor. 4. Acute blood loss anemia after surgery, completed 2 doses of IV iron, hemoglobin stable.   5. Chronic DVT of right leg- DVT prophylaxis to be started on 2/17 per surgical recs.mitesh cr is up to 1.3  6. Stop lisinpirl  7. mitesh improving  8. Of  lisinpiorl  9. Recheck cr in two days  10. Consultations:   IP CONSULT TO INTERNAL MEDICINE  IP CONSULT TO SOCIAL WORK  IP CONSULT TO INTERNAL MEDICINE  IP CONSULT TO DIETITIAN  IP CONSULT TO SOCIAL WORK  IP CONSULT TO RECREATION THERAPY  IP CONSULT TO ORTHOPEDIC SURGERY      Kimi Robles MD  2/22/2020  7:53 AM    Copy sent to Dr. Benja Meza MD    Please note that this chart was generated using voice recognition Dragon dictation software. Although every effort was made to ensure the accuracy of this automated transcription, some errors in transcription may have occurred.

## 2020-02-22 NOTE — PROGRESS NOTES
1/3/20  Yes Yolette Monroy MD   VITAMIN D, ERGOCALCIFEROL, PO Take by mouth   Yes Historical Provider, MD   hydrALAZINE (APRESOLINE) 50 MG tablet Take 1 tablet by mouth 3 times daily 1/2/20  Yes Carrie Albright MD   citalopram (CELEXA) 10 MG tablet Take 1 tablet by mouth daily 1/2/20  Yes Carrie Albright MD   fenofibrate micronized (LOFIBRA) 134 MG capsule Take 1 capsule by mouth every morning (before breakfast) 11/26/19  Yes Carrie Albright MD   lisinopril (PRINIVIL;ZESTRIL) 10 MG tablet Take 1 tablet by mouth daily 11/26/19  Yes Carrie Albright MD   pramipexole (MIRAPEX) 1 MG tablet Take 1.5 tablets by mouth daily 11/26/19  Yes Carrie Albright MD   atorvastatin (LIPITOR) 80 MG tablet Take 0.5 tablets by mouth nightly 11/11/19  Yes Leon Galvez MD   pantoprazole (PROTONIX) 40 MG tablet Take 1 tablet by mouth 2 times daily (before meals) 11/11/19  Yes Leon Galvez MD   SITagliptin (JANUVIA) 100 MG tablet Take 0.5 tablets by mouth daily 10/10/19  Yes Randal Schwartz MD   carvedilol (COREG) 12.5 MG tablet Take 1 tablet by mouth 2 times daily 8/13/19  Yes MAIK Mondragon - CNP   Calcium Carbonate-Vitamin D (CALCIUM 500/D) 500-125 MG-UNIT TABS Take 1 tablet by mouth 2 times daily    Yes Historical Provider, MD   Lancets MISC 1 each by Does not apply route daily 10/10/19   Peterson Ramos MD   blood glucose monitor strips Test 2 times a day & as needed for symptoms of irregular blood glucose. 10/10/19   Peterson Ramos MD   ondansetron (ZOFRAN) 4 MG tablet Take 1 tablet by mouth daily as needed for Nausea or Vomiting 8/19/19   Peterson Ramos MD   lidocaine (XYLOCAINE) 5 % ointment 4-5 times a day to your right thigh for pain. 3/15/19   Sue Vazquez MD   nitroGLYCERIN (NITROSTAT) 0.4 MG SL tablet Place 1 tablet under the tongue every 5 minutes as needed for Chest pain 1/8/18   Peterson Ramos MD        Allergies:     Mobic [meloxicam]; Bactrim [sulfamethoxazole-trimethoprim];  Codeine; and Seasonal    Social clear to auscultation bilaterally, normal effort  Heart:  regular rate and rhythm, no murmur  Abdomen:  soft, nontender, nondistended, normal bowel sounds, no masses, hepatomegaly, splenomegaly  Extremities:  no edema, redness, tenderness in the calves  Skin:  no gross lesions, rashes, induration  Neuro:  Alert, oriented X 3,  Non-focal.    Investigations:      Laboratory Testing:  Lab Results   Component Value Date    WBC 8.2 02/21/2020    HGB 10.0 (L) 02/21/2020    HCT 29.7 (L) 02/21/2020    MCV 93.8 02/21/2020     (H) 02/21/2020     Lab Results   Component Value Date     02/22/2020    K 5.0 02/22/2020     02/22/2020    CO2 27 02/22/2020    BUN 33 02/22/2020    CREATININE 1.32 02/22/2020    GLUCOSE 107 02/22/2020    CALCIUM 9.3 02/22/2020         Assessment :      Primary Problem  Lumbar degenerative disc disease    Active Hospital Problems    Diagnosis Date Noted    Neurological disorder [G98.8] 02/12/2020    Iron deficiency anemia secondary to inadequate dietary iron intake [D50.8] 05/23/2019    Type 2 diabetes mellitus with circulatory disorder, without long-term current use of insulin (HCC) [E11.59] 03/18/2019    Stage 3 chronic kidney disease (HCC) [N18.3] 03/18/2019    Chronic deep vein thrombosis (DVT) of popliteal vein of right lower extremity (HCC) [I82.531] 03/18/2019    Chronic deep vein thrombosis (DVT) of proximal vein of both lower extremities (HCC) [I82.5Y3] 01/29/2018    Lumbar degenerative disc disease [M51.36]     Essential hypertension [I10]     GERD (gastroesophageal reflux disease) [K21.9]        Plan:     1. Hypertension, well controlled, continue with Coreg, lisinopril. 2. Diabetes type 2 on Tradjenta, low-dose sliding scale-blood glucose well controlled. 3. Hyperlipidemia-continue Lipitor. 4. Acute blood loss anemia after surgery, completed 2 doses of IV iron, hemoglobin stable.   5. Chronic DVT of right leg- DVT prophylaxis to be started on 2/17 per surgical 6. Stop lisinpirl  7. mitesh improving  8. Of  lisinpiorl  9. Recheck cr in two days  10. Consultations:   IP CONSULT TO INTERNAL MEDICINE  IP CONSULT TO SOCIAL WORK  IP CONSULT TO INTERNAL MEDICINE  IP CONSULT TO DIETITIAN  IP CONSULT TO SOCIAL WORK  IP CONSULT TO RECREATION THERAPY  IP CONSULT TO ORTHOPEDIC SURGERY      Belem Soto MD  2/22/2020  7:55 AM    Copy sent to Dr. Aiden Martin MD    Please note that this chart was generated using voice recognition Dragon dictation software. Although every effort was made to ensure the accuracy of this automated transcription, some errors in transcription may have occurred.

## 2020-02-22 NOTE — PROGRESS NOTES
Yes Donnie Horne MD   VITAMIN D, ERGOCALCIFEROL, PO Take by mouth   Yes Historical Provider, MD   hydrALAZINE (APRESOLINE) 50 MG tablet Take 1 tablet by mouth 3 times daily 1/2/20  Yes Ira Gusman MD   citalopram (CELEXA) 10 MG tablet Take 1 tablet by mouth daily 1/2/20  Yes Ira Gusman MD   fenofibrate micronized (LOFIBRA) 134 MG capsule Take 1 capsule by mouth every morning (before breakfast) 11/26/19  Yes Ira Gusman MD   lisinopril (PRINIVIL;ZESTRIL) 10 MG tablet Take 1 tablet by mouth daily 11/26/19  Yes Ira Gusman MD   pramipexole (MIRAPEX) 1 MG tablet Take 1.5 tablets by mouth daily 11/26/19  Yes Ira Gusman MD   atorvastatin (LIPITOR) 80 MG tablet Take 0.5 tablets by mouth nightly 11/11/19  Yes Dali Huertas MD   pantoprazole (PROTONIX) 40 MG tablet Take 1 tablet by mouth 2 times daily (before meals) 11/11/19  Yes Dali Huertas MD   SITagliptin (JANUVIA) 100 MG tablet Take 0.5 tablets by mouth daily 10/10/19  Yes Altsanthosh Schwartz MD   carvedilol (COREG) 12.5 MG tablet Take 1 tablet by mouth 2 times daily 8/13/19  Yes MAIK Bray - CNP   Calcium Carbonate-Vitamin D (CALCIUM 500/D) 500-125 MG-UNIT TABS Take 1 tablet by mouth 2 times daily    Yes Historical Provider, MD   Lancets MISC 1 each by Does not apply route daily 10/10/19   Teresa Jauregui MD   blood glucose monitor strips Test 2 times a day & as needed for symptoms of irregular blood glucose. 10/10/19   Teresa Jauregui MD   ondansetron (ZOFRAN) 4 MG tablet Take 1 tablet by mouth daily as needed for Nausea or Vomiting 8/19/19   Teresa Jauregui MD   lidocaine (XYLOCAINE) 5 % ointment 4-5 times a day to your right thigh for pain. 3/15/19   Donnell Davison MD   nitroGLYCERIN (NITROSTAT) 0.4 MG SL tablet Place 1 tablet under the tongue every 5 minutes as needed for Chest pain 1/8/18   Teresa Jauregui MD        Allergies:     Mobic [meloxicam]; Bactrim [sulfamethoxazole-trimethoprim];  Codeine; and Seasonal    Social History:     Tobacco:    reports that she quit smoking about 2 years ago. Her smoking use included cigarettes. She started smoking about 24 years ago. She has a 10.00 pack-year smoking history. She has never used smokeless tobacco.  Alcohol:      reports no history of alcohol use. Drug Use:  reports no history of drug use. Family History:     Family History   Problem Relation Age of Onset    Stroke Mother     Diabetes Mother     Heart Disease Mother     High Blood Pressure Mother     Heart Disease Father     Heart Disease Brother     High Blood Pressure Brother     Heart Disease Maternal Grandmother     High Blood Pressure Sister        Review of Systems:     Positive and Negative as described in HPI. CONSTITUTIONAL:  negative for fevers, chills, sweats, fatigue, weight loss  HEENT:  negative for vision, hearing changes, runny nose, throat pain  RESPIRATORY:  negative for shortness of breath, cough, congestion, wheezing. CARDIOVASCULAR:  negative for chest pain, palpitations.   GASTROINTESTINAL:  negative for nausea, vomiting, diarrhea, constipation, change in bowel habits, abdominal pain   GENITOURINARY:  negative for difficulty of urination, burning with urination, frequency   INTEGUMENT:  negative for rash, skin lesions, easy bruising   HEMATOLOGIC/LYMPHATIC:  negative for swelling/edema   ALLERGIC/IMMUNOLOGIC:  negative for urticaria , itching  ENDOCRINE:  negative increase in drinking, increase in urination, hot or cold intolerance  MUSCULOSKELETAL: Positive for back pain, weakness of legs  NEUROLOGICAL:  negative for headaches, dizziness, lightheadedness, numbness, pain, tingling extremities  BEHAVIOR/PSYCH:  negative for depression, anxiety    Physical Exam:     BP (!) 150/65   Pulse 62   Temp 98.3 °F (36.8 °C) (Oral)   Resp 20   Ht 5' 3\" (1.6 m)   Wt 167 lb (75.8 kg)   SpO2 97%   BMI 29.58 kg/m²   Temp (24hrs), Av °F (36.7 °C), Min:97.7 °F (36.5 °C), Max:98.3 °F (36.8 °C)    Recent Labs     02/21/20  1548 02/21/20  2002 02/22/20  0616 02/22/20  1047   POCGLU 135* 140* 95 87     No intake or output data in the 24 hours ending 02/22/20 1439    General Appearance:  alert, well appearing, and in no acute distress  Mental status: oriented to person, place, and time with normal affect  Head:  normocephalic, atraumatic. Eye: no icterus, redness, pupils equal and reactive, extraocular eye movements intact, conjunctiva clear  Ear: normal external ear, no discharge, hearing intact  Nose:  no drainage noted  Mouth: mucous membranes moist  Neck: supple, no carotid bruits, thyroid not palpable  Lungs: Bilateral equal air entry, clear to ausculation, no wheezing, rales or rhonchi, normal effort  Cardiovascular: normal rate, regular rhythm, no murmur, gallop, rub.   Abdomen: Soft, nontender, nondistended, normal bowel sounds, no hepatomegaly or splenomegaly  Neurologic: There are no new focal motor or sensory deficits, normal muscle tone and bulk, no abnormal sensation, normal speech, cranial nerves II through XII grossly intact  Skin: No gross lesions, rashes, bruising or bleeding on exposed skin area  Extremities:  peripheral pulses palpable, no pedal edema or calf pain with palpation  Psych: normal affect    Investigations:      Laboratory Testing:  Recent Results (from the past 24 hour(s))   POC Glucose Fingerstick    Collection Time: 02/21/20  3:48 PM   Result Value Ref Range    POC Glucose 135 (H) 65 - 105 mg/dL   POC Glucose Fingerstick    Collection Time: 02/21/20  8:02 PM   Result Value Ref Range    POC Glucose 140 (H) 65 - 105 mg/dL   POC Glucose Fingerstick    Collection Time: 02/22/20  6:16 AM   Result Value Ref Range    POC Glucose 95 65 - 105 mg/dL   Basic Metabolic Panel    Collection Time: 02/22/20  6:42 AM   Result Value Ref Range    Glucose 107 (H) 70 - 99 mg/dL    BUN 33 (H) 8 - 23 mg/dL    CREATININE 1.32 (H) 0.50 - 0.90 mg/dL    Bun/Cre Ratio NOT REPORTED 9 - 20    Calcium 9.3 8.6 - 10.4 mg/dL    Sodium 140 135 - 144 mmol/L    Potassium 5.0 3.7 - 5.3 mmol/L    Chloride 101 98 - 107 mmol/L    CO2 27 20 - 31 mmol/L    Anion Gap 12 9 - 17 mmol/L    GFR Non-African American 40 (L) >60 mL/min    GFR  48 (L) >60 mL/min    GFR Comment          GFR Staging NOT REPORTED    POC Glucose Fingerstick    Collection Time: 02/22/20 10:47 AM   Result Value Ref Range    POC Glucose 87 65 - 105 mg/dL       Imaging/Diagonstics:      Assessment :      Primary Problem  Lumbar degenerative disc disease    Active Hospital Problems    Diagnosis Date Noted    Neurological disorder [G98.8] 02/12/2020    Iron deficiency anemia secondary to inadequate dietary iron intake [D50.8] 05/23/2019    Type 2 diabetes mellitus with circulatory disorder, without long-term current use of insulin (HCC) [E11.59] 03/18/2019    Stage 3 chronic kidney disease ([facility] [N18.3] 03/18/2019    Chronic deep vein thrombosis (DVT) of popliteal vein of right lower extremity ([facility]) [I82.531] 03/18/2019    Chronic deep vein thrombosis (DVT) of proximal vein of both lower extremities ([facility] [I82.5Y3] 01/29/2018    Lumbar degenerative disc disease [M51.36]     Essential hypertension [I10]     GERD (gastroesophageal reflux disease) [K21.9]        Plan:     1. Hypertension, controlled, patient is on Coreg, hydralazine, lisinopril  2. Diabetes, on Tradjenta, will start on low-dose sliding scale, point-of-care glucose A and at bedtime, last HbA1c was 5.5  3. Hyperlipidemia on Lipitor, fibroids, lipid panel done in May 2019 reported as normal  4. Anemia, hemoglobin of 8.3.   Patient is on IV iron 200 mg , will continue with p.o. ferrous sulfate after 2 doses of IV iron  Chronic DVT of right leg,   5 patient should be on DVT prophylaxis, once okay with operating surgeon    2/22  BP is oK   Blood sugar Controlled   On Xarelto   Consultations:   IP CONSULT TO INTERNAL MEDICINE  IP CONSULT TO SOCIAL WORK  IP CONSULT TO INTERNAL MEDICINE  IP CONSULT TO DIETITIAN  IP CONSULT TO SOCIAL WORK  IP CONSULT TO RECREATION THERAPY  IP CONSULT TO ORTHOPEDIC SURGERY      Elle Avila MD  2/22/2020  2:39 PM    Copy sent to Dr. Elle Avila MD    Please note that this chart was generated using voice recognition Dragon dictation software. Although every effort was made to ensure the accuracy of this automated transcription, some errors in transcription may have occurred.

## 2020-02-23 LAB
ANION GAP SERPL CALCULATED.3IONS-SCNC: 12 MMOL/L (ref 9–17)
BUN BLDV-MCNC: 29 MG/DL (ref 8–23)
BUN/CREAT BLD: ABNORMAL (ref 9–20)
CALCIUM SERPL-MCNC: 9.6 MG/DL (ref 8.6–10.4)
CHLORIDE BLD-SCNC: 103 MMOL/L (ref 98–107)
CO2: 26 MMOL/L (ref 20–31)
CREAT SERPL-MCNC: 1.23 MG/DL (ref 0.5–0.9)
GFR AFRICAN AMERICAN: 52 ML/MIN
GFR NON-AFRICAN AMERICAN: 43 ML/MIN
GFR SERPL CREATININE-BSD FRML MDRD: ABNORMAL ML/MIN/{1.73_M2}
GFR SERPL CREATININE-BSD FRML MDRD: ABNORMAL ML/MIN/{1.73_M2}
GLUCOSE BLD-MCNC: 107 MG/DL (ref 65–105)
GLUCOSE BLD-MCNC: 116 MG/DL (ref 65–105)
GLUCOSE BLD-MCNC: 123 MG/DL (ref 70–99)
GLUCOSE BLD-MCNC: 129 MG/DL (ref 65–105)
GLUCOSE BLD-MCNC: 99 MG/DL (ref 65–105)
POTASSIUM SERPL-SCNC: 5.1 MMOL/L (ref 3.7–5.3)
SODIUM BLD-SCNC: 141 MMOL/L (ref 135–144)

## 2020-02-23 PROCEDURE — 97110 THERAPEUTIC EXERCISES: CPT

## 2020-02-23 PROCEDURE — 6370000000 HC RX 637 (ALT 250 FOR IP): Performed by: ORTHOPAEDIC SURGERY

## 2020-02-23 PROCEDURE — 6370000000 HC RX 637 (ALT 250 FOR IP): Performed by: PHYSICAL MEDICINE & REHABILITATION

## 2020-02-23 PROCEDURE — 97535 SELF CARE MNGMENT TRAINING: CPT

## 2020-02-23 PROCEDURE — 80048 BASIC METABOLIC PNL TOTAL CA: CPT

## 2020-02-23 PROCEDURE — 6370000000 HC RX 637 (ALT 250 FOR IP): Performed by: INTERNAL MEDICINE

## 2020-02-23 PROCEDURE — 97530 THERAPEUTIC ACTIVITIES: CPT

## 2020-02-23 PROCEDURE — 97116 GAIT TRAINING THERAPY: CPT

## 2020-02-23 PROCEDURE — 1180000000 HC REHAB R&B

## 2020-02-23 PROCEDURE — 36415 COLL VENOUS BLD VENIPUNCTURE: CPT

## 2020-02-23 PROCEDURE — 99232 SBSQ HOSP IP/OBS MODERATE 35: CPT | Performed by: INTERNAL MEDICINE

## 2020-02-23 PROCEDURE — 82947 ASSAY GLUCOSE BLOOD QUANT: CPT

## 2020-02-23 RX ORDER — CARVEDILOL 6.25 MG/1
6.25 TABLET ORAL 2 TIMES DAILY
Status: DISCONTINUED | OUTPATIENT
Start: 2020-02-23 | End: 2020-02-24 | Stop reason: HOSPADM

## 2020-02-23 RX ADMIN — SENNOSIDES AND DOCUSATE SODIUM 1 TABLET: 8.6; 5 TABLET ORAL at 08:45

## 2020-02-23 RX ADMIN — PANTOPRAZOLE SODIUM 40 MG: 40 TABLET, DELAYED RELEASE ORAL at 04:55

## 2020-02-23 RX ADMIN — PRAMIPEXOLE DIHYDROCHLORIDE 1.5 MG: 1.5 TABLET ORAL at 21:24

## 2020-02-23 RX ADMIN — FENOFIBRATE 160 MG: 160 TABLET ORAL at 04:55

## 2020-02-23 RX ADMIN — RIVAROXABAN 10 MG: 10 TABLET, FILM COATED ORAL at 16:57

## 2020-02-23 RX ADMIN — LINAGLIPTIN 5 MG: 5 TABLET, FILM COATED ORAL at 08:45

## 2020-02-23 RX ADMIN — FERROUS SULFATE TAB 325 MG (65 MG ELEMENTAL FE) 325 MG: 325 (65 FE) TAB at 08:45

## 2020-02-23 RX ADMIN — CITALOPRAM HYDROBROMIDE 10 MG: 10 TABLET ORAL at 08:45

## 2020-02-23 RX ADMIN — OXYCODONE HYDROCHLORIDE 10 MG: 5 TABLET ORAL at 16:26

## 2020-02-23 RX ADMIN — OXYCODONE HYDROCHLORIDE 10 MG: 5 TABLET ORAL at 08:52

## 2020-02-23 RX ADMIN — Medication 1 TABLET: at 21:24

## 2020-02-23 RX ADMIN — CARVEDILOL 12.5 MG: 12.5 TABLET, FILM COATED ORAL at 08:45

## 2020-02-23 RX ADMIN — SENNOSIDES AND DOCUSATE SODIUM 1 TABLET: 8.6; 5 TABLET ORAL at 21:23

## 2020-02-23 RX ADMIN — POLYETHYLENE GLYCOL 3350 17 G: 17 POWDER, FOR SOLUTION ORAL at 08:45

## 2020-02-23 RX ADMIN — ATORVASTATIN CALCIUM 40 MG: 40 TABLET, FILM COATED ORAL at 21:23

## 2020-02-23 RX ADMIN — PANTOPRAZOLE SODIUM 40 MG: 40 TABLET, DELAYED RELEASE ORAL at 16:57

## 2020-02-23 RX ADMIN — OXYCODONE HYDROCHLORIDE 10 MG: 5 TABLET ORAL at 04:55

## 2020-02-23 RX ADMIN — CARVEDILOL 6.25 MG: 6.25 TABLET, FILM COATED ORAL at 21:23

## 2020-02-23 RX ADMIN — Medication 1 TABLET: at 08:45

## 2020-02-23 ASSESSMENT — PAIN DESCRIPTION - PAIN TYPE
TYPE: SURGICAL PAIN

## 2020-02-23 ASSESSMENT — PAIN DESCRIPTION - DESCRIPTORS
DESCRIPTORS: ACHING
DESCRIPTORS: ACHING

## 2020-02-23 ASSESSMENT — PAIN DESCRIPTION - ONSET
ONSET: ON-GOING
ONSET: ON-GOING

## 2020-02-23 ASSESSMENT — PAIN DESCRIPTION - ORIENTATION
ORIENTATION: LOWER

## 2020-02-23 ASSESSMENT — PAIN SCALES - WONG BAKER
WONGBAKER_NUMERICALRESPONSE: 0
WONGBAKER_NUMERICALRESPONSE: 0

## 2020-02-23 ASSESSMENT — PAIN SCALES - GENERAL
PAINLEVEL_OUTOF10: 6
PAINLEVEL_OUTOF10: 7
PAINLEVEL_OUTOF10: 8
PAINLEVEL_OUTOF10: 6
PAINLEVEL_OUTOF10: 7
PAINLEVEL_OUTOF10: 0

## 2020-02-23 ASSESSMENT — PAIN DESCRIPTION - LOCATION
LOCATION: BACK

## 2020-02-23 ASSESSMENT — PAIN DESCRIPTION - FREQUENCY
FREQUENCY: CONTINUOUS
FREQUENCY: CONTINUOUS

## 2020-02-23 ASSESSMENT — PAIN DESCRIPTION - DIRECTION: RADIATING_TOWARDS: TAILBONE

## 2020-02-23 ASSESSMENT — PAIN - FUNCTIONAL ASSESSMENT
PAIN_FUNCTIONAL_ASSESSMENT: PREVENTS OR INTERFERES SOME ACTIVE ACTIVITIES AND ADLS
PAIN_FUNCTIONAL_ASSESSMENT: PREVENTS OR INTERFERES SOME ACTIVE ACTIVITIES AND ADLS

## 2020-02-23 ASSESSMENT — PAIN DESCRIPTION - PROGRESSION: CLINICAL_PROGRESSION: GRADUALLY IMPROVING

## 2020-02-23 NOTE — PROGRESS NOTES
Muscle strain     right posterior shoulder    Other abnormal glucose     PONV (postoperative nausea and vomiting)     dry heaves    Pre-diabetes     Restless legs syndrome (RLS)     TIA (transient ischemic attack) 2014    Vitamin D deficiency     Wears glasses         Past Surgical History:     Past Surgical History:   Procedure Laterality Date    ABDOMEN SURGERY  1976    benign tumor removed near remaining ovary, 1.5 pounds    APPENDECTOMY  1968    appendix ruptured, developed peritonitis    BUNIONECTOMY Left     along with calcium deposits removed   [address]  2005    negative    COLECTOMY  1969    12 INCHES REMOVED D/T OBSTRUCTION    COLONOSCOPY      CYST REMOVAL Right     right facial    HYSTERECTOMY  1973    taken as a result of recurring cysts    LUMBAR FUSION N/A 2/10/2020    LUMBAR L4-5 POSTERIOR  DECOMPRESSION INSTRUMENTATION FUSION WCEMENT AUGMENTATION/ performed by Vesta Figueroa MD at [address] [address]  8/14/14    FESS    OVARY REMOVAL  1970    UNILATERAL due to cyst    OVARY REMOVAL  1971    partial, due to cyst    SINUS SURGERY  2004    UPPER GASTROINTESTINAL ENDOSCOPY N/A 5/31/2019    EGD ESOPHAGOGASTRODUODENOSCOPY performed by Charlynn Dakins, MD at [address] [place] [address] 8/5/2019    EGD BIOPSY performed by Vladislav De Dios MD at [address] [place] [address] N/A 8/23/2019    EGD BIOPSY performed by Charlynn Dakins, MD at [address] Left 3/5/2019    WRIST OPEN REDUCTION INTERNAL FIXATION performed by Elizabeth Engel MD at [address] [facility]        Medications Prior to Admission:     Prior to Admission medications    Medication Sig Start Date End Date Taking?  Authorizing Provider   furosemide (LASIX) 40 MG tablet Take 1 tablet by mouth daily 1/20/20  Yes Kiera Sena MD   ferrous sulfate 325 (65 Fe) MG tablet Take 1 tablet by mouth daily (with breakfast) 1/3/20 Yes Nuha Bishop MD   VITAMIN D, ERGOCALCIFEROL, PO Take by mouth   Yes Historical Provider, MD   hydrALAZINE (APRESOLINE) 50 MG tablet Take 1 tablet by mouth 3 times daily 1/2/20  Yes Luz Marina Crowder MD   citalopram (CELEXA) 10 MG tablet Take 1 tablet by mouth daily 1/2/20  Yes Luz Marina Crowder MD   fenofibrate micronized (LOFIBRA) 134 MG capsule Take 1 capsule by mouth every morning (before breakfast) 11/26/19  Yes Luz Marina Crowder MD   lisinopril (PRINIVIL;ZESTRIL) 10 MG tablet Take 1 tablet by mouth daily 11/26/19  Yes Luz Marina Crowder MD   pramipexole (MIRAPEX) 1 MG tablet Take 1.5 tablets by mouth daily 11/26/19  Yes Luz Marina Crowder MD   atorvastatin (LIPITOR) 80 MG tablet Take 0.5 tablets by mouth nightly 11/11/19  Yes Romulo Alberto MD   pantoprazole (PROTONIX) 40 MG tablet Take 1 tablet by mouth 2 times daily (before meals) 11/11/19  Yes Romulo Alberto MD   SITagliptin (JANUVIA) 100 MG tablet Take 0.5 tablets by mouth daily 10/10/19  Yes Carolina Schwartz MD   carvedilol (COREG) 12.5 MG tablet Take 1 tablet by mouth 2 times daily 8/13/19  Yes MAIK Calderon - CNP   Calcium Carbonate-Vitamin D (CALCIUM 500/D) 500-125 MG-UNIT TABS Take 1 tablet by mouth 2 times daily    Yes Historical Provider, MD   Lancets MISC 1 each by Does not apply route daily 10/10/19   Jimenez Bowden MD   blood glucose monitor strips Test 2 times a day & as needed for symptoms of irregular blood glucose. 10/10/19   Jimenez Bowden MD   ondansetron (ZOFRAN) 4 MG tablet Take 1 tablet by mouth daily as needed for Nausea or Vomiting 8/19/19   Jimenez Bowden MD   lidocaine (XYLOCAINE) 5 % ointment 4-5 times a day to your right thigh for pain. 3/15/19   Devang Krause MD   nitroGLYCERIN (NITROSTAT) 0.4 MG SL tablet Place 1 tablet under the tongue every 5 minutes as needed for Chest pain 1/8/18   Jimenez Bowden MD        Allergies:     Mobic [meloxicam]; Bactrim [sulfamethoxazole-trimethoprim];  Codeine; and Seasonal    Social °C)    Recent Labs     02/22/20  1547 02/22/20  1928 02/23/20  0638 02/23/20  1031   POCGLU 114* 114* 107* 99     No intake or output data in the 24 hours ending 02/23/20 1605    General Appearance:  alert, well appearing, and in no acute distress  Mental status: oriented to person, place, and time with normal affect  Head:  normocephalic, atraumatic. Eye: no icterus, redness, pupils equal and reactive, extraocular eye movements intact, conjunctiva clear  Ear: normal external ear, no discharge, hearing intact  Nose:  no drainage noted  Mouth: mucous membranes moist  Neck: supple, no carotid bruits, thyroid not palpable  Lungs: Bilateral equal air entry, clear to ausculation, no wheezing, rales or rhonchi, normal effort  Cardiovascular: normal rate, regular rhythm, no murmur, gallop, rub.   Abdomen: Soft, nontender, nondistended, normal bowel sounds, no hepatomegaly or splenomegaly  Neurologic: There are no new focal motor or sensory deficits, normal muscle tone and bulk, no abnormal sensation, normal speech, cranial nerves II through XII grossly intact  Skin: No gross lesions, rashes, bruising or bleeding on exposed skin area  Extremities:  peripheral pulses palpable, no pedal edema or calf pain with palpation  Psych: normal affect    Investigations:      Laboratory Testing:  Recent Results (from the past 24 hour(s))   POC Glucose Fingerstick    Collection Time: 02/22/20  7:28 PM   Result Value Ref Range    POC Glucose 114 (H) 65 - 105 mg/dL   Basic Metabolic Panel    Collection Time: 02/23/20  6:34 AM   Result Value Ref Range    Glucose 123 (H) 70 - 99 mg/dL    BUN 29 (H) 8 - 23 mg/dL    CREATININE 1.23 (H) 0.50 - 0.90 mg/dL    Bun/Cre Ratio NOT REPORTED 9 - 20    Calcium 9.6 8.6 - 10.4 mg/dL    Sodium 141 135 - 144 mmol/L    Potassium 5.1 3.7 - 5.3 mmol/L    Chloride 103 98 - 107 mmol/L    CO2 26 20 - 31 mmol/L    Anion Gap 12 9 - 17 mmol/L    GFR Non-African American 43 (L) >60 mL/min    GFR  52 (L) >60 mL/min    GFR Comment          GFR Staging NOT REPORTED    POC Glucose Fingerstick    Collection Time: 02/23/20  6:38 AM   Result Value Ref Range    POC Glucose 107 (H) 65 - 105 mg/dL   POC Glucose Fingerstick    Collection Time: 02/23/20 10:31 AM   Result Value Ref Range    POC Glucose 99 65 - 105 mg/dL       Imaging/Diagonstics:      Assessment :      Primary Problem  Lumbar degenerative disc disease    Active Hospital Problems    Diagnosis Date Noted    Neurological disorder [G98.8] 02/12/2020    Iron deficiency anemia secondary to inadequate dietary iron intake [D50.8] 05/23/2019    Type 2 diabetes mellitus with circulatory disorder, without long-term current use of insulin (HCC) [E11.59] 03/18/2019    Stage 3 chronic kidney disease ([address] [N18.3] 03/18/2019    Chronic deep vein thrombosis (DVT) of popliteal vein of right lower extremity ([facility]) [I82.531] 03/18/2019    Chronic deep vein thrombosis (DVT) of proximal vein of both lower extremities ([facility] [I82.5Y3] 01/29/2018    Lumbar degenerative disc disease [M51.36]     Essential hypertension [I10]     GERD (gastroesophageal reflux disease) [K21.9]        Plan:     1. Hypertension, controlled, patient is on Coreg, hydralazine, lisinopril  2. Diabetes, on Tradjenta, will start on low-dose sliding scale, point-of-care glucose A and at bedtime, last HbA1c was 5.5  3. Hyperlipidemia on Lipitor, fibroids, lipid panel done in May 2019 reported as normal  4. Anemia, hemoglobin of 8.3.   Patient is on IV iron 200 mg , will continue with p.o. ferrous sulfate after 2 doses of IV iron  Chronic DVT of right leg,   5 patient should be on DVT prophylaxis, once okay with operating surgeon    2/22  BP is oK   Blood sugar Controlled   On Xarelto     2/23  Patient doing [facility] '  As per Nursing Reports , has Readings of Low BP  Ends up holding Antihypertensives   Will Reducing Coreg to 6.25 BID   Discussed with RN   Consultations:   IP CONSULT TO INTERNAL MEDICINE  IP

## 2020-02-23 NOTE — PROGRESS NOTES
[address] [facility]    ACUTE REHABILITATION OCCUPATIONAL THERAPY  DAILY NOTE    Date: 20  Patient Name: Ramos Snow      Room: 6773/6648-60    MRN: [id number]   : 1950  (72 y.o.)  Gender: female   Referring Practitioner: Dr. Rae/Dr. Anatoly Vides  Diagnosis: Neurological disorder; Lumbar L4-5 PLIF Vertebroplasty L4 & L5 2/10/20       Restrictions  Restrictions/Precautions: Fall Risk, Surgical Protocols  Implants present? : Metal implants  Other position/activity restrictions: up with assistance  Required Braces or Orthoses?: No  Equipment Used: Other, Wheelchair, Bed    Subjective  Subjective: \"yeah I am going home tomorrow\"  Comments: Patient pleasant and cooperative this date. Heading to bathroom with PCT present upon OT arrival in AM   Patient Currently in Pain: Denies     Overall Orientation Status: Within Functional Limits          Objective  Cognition  Memory: Decreased short term memory;Decreased recall of recent events  Safety Judgement: Decreased awareness of need for safety  Awareness of Errors: Decreased awareness of errors  Balance  Standing Balance: Stand by assistance  Transfers  Sit to stand: Stand by assistance  Stand to sit: Stand by assistance  Transfer Comments: from w/c at sink used BUEs on sink to pull from - encouraged pushing up from w/c arm rests   Standing Balance  Time: ~5 minutes total ; ~5 minutes total  Activity: ADLs ; IADL-laundry  Comment: 0-1 UE support        Type of ROM/Therapeutic Exercise  Comment: Distributed moderate resistance foam block for increased L hand strength (patient reports injury to L wrist this past summer, had surgery and went through  therapy. reports recent increased weakness/decreased coordination in L hand) Provided education/instruction and provided handout of appropriate exercises      Additional Activities: Other  Additional Activities: Discussed Home safety / Fall prevention strategies, distributed handout and provided education. clothing/soap/setting controls     Assessment  Performance deficits / Impairments: Decreased functional mobility ; Decreased ADL status; Decreased strength;Decreased safe awareness;Decreased endurance;Decreased cognition;Decreased balance;Decreased high-level IADLs;Decreased fine motor control;Decreased coordination;Decreased posture;Decreased sensation  Prognosis: Good  Discharge Recommendations: Patient would benefit from continued therapy after discharge  Activity Tolerance: Patient Tolerated treatment well  Safety Devices in place: Yes  Type of devices: Left in chair;Call light within reach; Chair alarm in place          Patient Education:  Patient Goals   Patient goals : \"To be able to do all the things I need to do\"  Learner:patient  Method: demonstration, explanation and handout       Outcome: Verbalizes understanding    OT Education  OT Education: OT Role;Plan of Care;Transfer Training;ADL Adaptive Strategies; Equipment;Home Exercise Program    Plan  Plan  Times per week: 5-7  Times per day: Twice a day  Current Treatment Recommendations: Self-Care / ADL, Home Management Training, Strengthening, Balance Training, Functional Mobility Training, Endurance Training, Cognitive Reorientation, Pain Management, Safety Education & Training, Patient/Caregiver Education & Training, Equipment Evaluation, Education, & procurement, Cognitive/Perceptual Training  Patient Goals   Patient goals : \"To be able to do all the things I need to do\"  Short term goals  Time Frame for Short term goals: One week  Short term goal 1: Pt will V/D Good understanding of AE/DME/modified techniques for increased IND with self-care and mobility. Short term goal 2: Pt will perform UB ADLs with SUP and LB ADLs with SBA and Good safety. Short term goal 3: Pt will actively participate in 30+ minutes of therapeutic exercise/functional activities to promote increased IND with self-care and mobility.   Short term goal 4: Pt will stand for 5+ minutes

## 2020-02-23 NOTE — PROGRESS NOTES
Fusion. Pt has lumbar degenerative disc disease. The initial symptoms started 4 years ago. 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 Pain described as constant, aching rating 5/10. Pain is aggravated with standing. Sitting relieves pain. Pt c/o of pain in the lumbar spine area, radiates to the lower limbs worse on the left side. Pt reports pain radiates down left thigh to knee and down right thigh causing tingling. Pt reports difficulty with ambulation due to back pain and \"balance issues\". Uses a cane for ambulation. Has sustained multiple falls with last fall being within last week. No redness, swelling or rashes. Pt has tried injections, physical therapy and NSAIDS. She can no longer take NSAIDS as she developed a bleeding ulcer and required a blood transfusion in August of 2018. Pt currently goes to pain management and has been on Percocet with minimal relief of back pain. Pt admitted to [facility] 2/12/20. Overall Orientation Status: Within Normal Limits  Restrictions/Precautions  Restrictions/Precautions: Fall Risk;Surgical Protocols  Required Braces or Orthoses?: No  Implants present? : Metal implants  Position Activity Restriction  Other position/activity restrictions: up with assistance    Subjective: Patient reports  was in earlier just to  some clothes and he is very sick. Patient reports he was not able to stay for family training. Comments: Continues to have slow movements and very guarded. Vital Signs  Patient Currently in Pain: Denies           Patient Observation  Observations: Decreased safety awareness.         Bed Mobility:   Rolling: Stand by assistance  Supine to Sit: Stand by assistance  Sit to Supine: Stand by assistance  Scooting: Stand by assistance      Transfers:  Sit to Stand: Supervision  Stand to sit: Supervision  Bed to Chair: Supervision     Squat Pivot Transfers: Supervision        Ambulation 1  Surface: level tile  Device: Rolling Walker  Assistance: Stand

## 2020-02-24 VITALS
TEMPERATURE: 98.8 F | HEIGHT: 63 IN | HEART RATE: 59 BPM | RESPIRATION RATE: 20 BRPM | BODY MASS INDEX: 29.59 KG/M2 | SYSTOLIC BLOOD PRESSURE: 129 MMHG | DIASTOLIC BLOOD PRESSURE: 56 MMHG | OXYGEN SATURATION: 97 % | WEIGHT: 167 LBS

## 2020-02-24 LAB
ANION GAP SERPL CALCULATED.3IONS-SCNC: 12 MMOL/L (ref 9–17)
BUN BLDV-MCNC: 30 MG/DL (ref 8–23)
BUN/CREAT BLD: ABNORMAL (ref 9–20)
CALCIUM SERPL-MCNC: 9.7 MG/DL (ref 8.6–10.4)
CHLORIDE BLD-SCNC: 103 MMOL/L (ref 98–107)
CO2: 24 MMOL/L (ref 20–31)
CREAT SERPL-MCNC: 1.21 MG/DL (ref 0.5–0.9)
GFR AFRICAN AMERICAN: 53 ML/MIN
GFR NON-AFRICAN AMERICAN: 44 ML/MIN
GFR SERPL CREATININE-BSD FRML MDRD: ABNORMAL ML/MIN/{1.73_M2}
GFR SERPL CREATININE-BSD FRML MDRD: ABNORMAL ML/MIN/{1.73_M2}
GLUCOSE BLD-MCNC: 104 MG/DL (ref 65–105)
GLUCOSE BLD-MCNC: 104 MG/DL (ref 70–99)
GLUCOSE BLD-MCNC: 107 MG/DL (ref 65–105)
POTASSIUM SERPL-SCNC: 4.7 MMOL/L (ref 3.7–5.3)
SODIUM BLD-SCNC: 139 MMOL/L (ref 135–144)

## 2020-02-24 PROCEDURE — 6370000000 HC RX 637 (ALT 250 FOR IP): Performed by: PHYSICAL MEDICINE & REHABILITATION

## 2020-02-24 PROCEDURE — 97150 GROUP THERAPEUTIC PROCEDURES: CPT

## 2020-02-24 PROCEDURE — 6370000000 HC RX 637 (ALT 250 FOR IP): Performed by: INTERNAL MEDICINE

## 2020-02-24 PROCEDURE — 97116 GAIT TRAINING THERAPY: CPT

## 2020-02-24 PROCEDURE — 97130 THER IVNTJ EA ADDL 15 MIN: CPT

## 2020-02-24 PROCEDURE — 97530 THERAPEUTIC ACTIVITIES: CPT

## 2020-02-24 PROCEDURE — 82947 ASSAY GLUCOSE BLOOD QUANT: CPT

## 2020-02-24 PROCEDURE — 80048 BASIC METABOLIC PNL TOTAL CA: CPT

## 2020-02-24 PROCEDURE — 97129 THER IVNTJ 1ST 15 MIN: CPT

## 2020-02-24 PROCEDURE — 36415 COLL VENOUS BLD VENIPUNCTURE: CPT

## 2020-02-24 PROCEDURE — 97535 SELF CARE MNGMENT TRAINING: CPT

## 2020-02-24 PROCEDURE — 99239 HOSP IP/OBS DSCHRG MGMT >30: CPT | Performed by: PHYSICAL MEDICINE & REHABILITATION

## 2020-02-24 RX ORDER — POLYETHYLENE GLYCOL 3350 17 G/17G
17 POWDER, FOR SOLUTION ORAL DAILY
Qty: 527 G | Refills: 1 | COMMUNITY
Start: 2020-02-24 | End: 2020-03-25

## 2020-02-24 RX ORDER — CARVEDILOL 6.25 MG/1
6.25 TABLET ORAL 2 TIMES DAILY
Qty: 60 TABLET | Refills: 0 | Status: ON HOLD | OUTPATIENT
Start: 2020-02-24 | End: 2020-06-05 | Stop reason: SDUPTHER

## 2020-02-24 RX ORDER — OXYCODONE HYDROCHLORIDE 5 MG/1
5 TABLET ORAL EVERY 4 HOURS PRN
Qty: 42 TABLET | Refills: 0 | Status: SHIPPED | OUTPATIENT
Start: 2020-02-24 | End: 2020-03-02

## 2020-02-24 RX ADMIN — CITALOPRAM HYDROBROMIDE 10 MG: 10 TABLET ORAL at 07:18

## 2020-02-24 RX ADMIN — Medication 1 TABLET: at 07:18

## 2020-02-24 RX ADMIN — PANTOPRAZOLE SODIUM 40 MG: 40 TABLET, DELAYED RELEASE ORAL at 05:16

## 2020-02-24 RX ADMIN — FERROUS SULFATE TAB 325 MG (65 MG ELEMENTAL FE) 325 MG: 325 (65 FE) TAB at 07:18

## 2020-02-24 RX ADMIN — OXYCODONE HYDROCHLORIDE 5 MG: 5 TABLET ORAL at 05:16

## 2020-02-24 RX ADMIN — CARVEDILOL 6.25 MG: 6.25 TABLET, FILM COATED ORAL at 07:18

## 2020-02-24 RX ADMIN — FENOFIBRATE 160 MG: 160 TABLET ORAL at 05:16

## 2020-02-24 RX ADMIN — OXYCODONE HYDROCHLORIDE 5 MG: 5 TABLET ORAL at 01:05

## 2020-02-24 RX ADMIN — POLYETHYLENE GLYCOL 3350 17 G: 17 POWDER, FOR SOLUTION ORAL at 07:20

## 2020-02-24 RX ADMIN — OXYCODONE HYDROCHLORIDE 10 MG: 5 TABLET ORAL at 10:04

## 2020-02-24 RX ADMIN — SENNOSIDES AND DOCUSATE SODIUM 1 TABLET: 8.6; 5 TABLET ORAL at 07:18

## 2020-02-24 RX ADMIN — LINAGLIPTIN 5 MG: 5 TABLET, FILM COATED ORAL at 07:18

## 2020-02-24 ASSESSMENT — PAIN DESCRIPTION - ONSET
ONSET: ON-GOING
ONSET: ON-GOING

## 2020-02-24 ASSESSMENT — PAIN DESCRIPTION - LOCATION
LOCATION: BACK

## 2020-02-24 ASSESSMENT — PAIN DESCRIPTION - ORIENTATION
ORIENTATION: LOWER

## 2020-02-24 ASSESSMENT — PAIN DESCRIPTION - PAIN TYPE
TYPE: CHRONIC PAIN;SURGICAL PAIN
TYPE: CHRONIC PAIN;SURGICAL PAIN
TYPE: SURGICAL PAIN
TYPE: SURGICAL PAIN

## 2020-02-24 ASSESSMENT — PAIN SCALES - GENERAL
PAINLEVEL_OUTOF10: 7
PAINLEVEL_OUTOF10: 7
PAINLEVEL_OUTOF10: 5
PAINLEVEL_OUTOF10: 7
PAINLEVEL_OUTOF10: 7
PAINLEVEL_OUTOF10: 3
PAINLEVEL_OUTOF10: 3

## 2020-02-24 ASSESSMENT — PAIN DESCRIPTION - FREQUENCY
FREQUENCY: CONTINUOUS
FREQUENCY: CONTINUOUS

## 2020-02-24 ASSESSMENT — PAIN DESCRIPTION - DESCRIPTORS
DESCRIPTORS: CONSTANT;DISCOMFORT;SHOOTING
DESCRIPTORS: ACHING;DULL
DESCRIPTORS: PRESSURE;SHOOTING

## 2020-02-24 ASSESSMENT — PAIN - FUNCTIONAL ASSESSMENT: PAIN_FUNCTIONAL_ASSESSMENT: PREVENTS OR INTERFERES SOME ACTIVE ACTIVITIES AND ADLS

## 2020-02-24 NOTE — PLAN OF CARE
Problem: Falls - Risk of:  Goal: Will remain free from falls  Description  Will remain free from falls  2/24/2020 7308 by Serafin Bhakta RN  Outcome: Ongoing  Note:   No falls during this shift. Call light is within reach. Side rails up x2 with bed in lowest position. Patient safety maintained. 2/23/2020 1806 by Jose Jhaveri RN  Outcome: Ongoing  Note:   Patient remains free from falls so far this shift. Will continue to round at least hourly, place bed in lowest position with call light within reach, and alarm activated. Problem: Falls - Risk of:  Goal: Absence of physical injury  Description  Absence of physical injury  Outcome: Ongoing  Note:   No injury this shift. Patient safety maintained. Problem: Pain:  Description  Pain management should include both nonpharmacologic and pharmacologic interventions. Goal: Pain level will decrease  Description  Pain level will decrease  Outcome: Ongoing  Note:   Patients pain level decreased with PRN medications.

## 2020-02-24 NOTE — PROGRESS NOTES
sadiq      Sequential thought: 4 unit picture cards, household tasks, 100% accuracy sadiq, pt self correcting throughout  Other:   No family present during session       Plan:  [] Continue  services    [x] Discharge from [address] [place] [address] Dr:   Pt being discharged from [facility] this date, no further therapy recommended at d/c. Discharge recommendations: [] Inpatient Rehab   [] [place]   [] Outpatient Therapy  [] Follow up at trauma clinic   [] Other:       Treatment completed by:  Debo Crespo M.A., [address] [facility] [place], operated by [facility]

## 2020-02-24 NOTE — DISCHARGE SUMMARY
Physical Medicine & Rehabilitation  Discharge Summary     Patient Identification:  Leanna Quintero  : 1950  Admit date: 2020  Discharge date: 2020   Attending provider: Hillary Valverde MD        Primary care provider: Gregorio Stone MD     Discharge Diagnoses:   Lumbar stenosis s/p L4-5 posterior fusion and vertebroplasty L4 and L5, HTN, Hyperlipidemia, Depression, Impaired Cognition, Blood loss anemia - stable, DM, GERD, Chronic RLE DVT    Discharge Functional Status:    Physical therapy:  Bed Mobility: Rolling: Stand by assistance  Supine to Sit: Stand by assistance  Sit to Supine: Stand by assistance  Scooting: Stand by assistance  Transfers: Sit to Stand: Supervision  Stand to sit: Supervision  Bed to Chair: Supervision  Squat Pivot Transfers: Supervision  Comment: breaks PRN, Ambulation 1  Surface: level tile  Device: Rolling Walker  Other Apparatus: (IV pole)  Assistance: Stand by assistance  Quality of Gait: Slow shuffling gait. Cues to increases step length. Cues for upright posture and to scan environment. Gait Deviations: Slow Alicia, Decreased step length, Decreased step height  Distance: 200 ft AM and PM;  Short distances within gym  Comments: Patient needs redirected frequently and multiple safety cues. Patient reports  will be with her  at home., Stairs  # Steps : 15  Stairs Height: 6\"  Rails: Bilateral(& 4\")  Device: No Device  Assistance: Supervision  Comment: Demonstrated safe technique. Mobility:  , PT Equipment Recommendations  Equipment Needed: No(Patient reports she has DME at home.), Assessment: Pt states she req's additional assist for bed mobility with fatigue.      Occupational therapy:  ,  ,      Speech therapy:  Comprehension: 5 - Patient understands basic needs (hungry/hot/pain)  Expression: 5 - Expresses basic ideas/needs only (hungry/hot/pain)  Social Interaction: 5 - Patient is appropriate with supervision/cues  Problem Solvin - Patient solves Medicine    Significant Diagnostics:   CBC:   Lab Results   Component Value Date    WBC 8.2 02/21/2020    RBC 3.17 02/21/2020    HGB 10.0 02/21/2020    HCT 29.7 02/21/2020    MCV 93.8 02/21/2020    MCH 31.6 02/21/2020    MCHC 33.7 02/21/2020    RDW 14.3 02/21/2020     02/21/2020    MPV 7.4 02/21/2020     BMP:    Lab Results   Component Value Date     02/24/2020    K 4.7 02/24/2020     02/24/2020    CO2 24 02/24/2020    BUN 30 02/24/2020    LABALBU 4.5 01/27/2020    CREATININE 1.21 02/24/2020    CALCIUM 9.7 02/24/2020    GFRAA 53 02/24/2020    LABGLOM 44 02/24/2020    GLUCOSE 104 02/24/2020         Patient Instructions:    No driving until cleared by physician. Medications, precautions and follow up reviewed with patient and family    Follow-up visits: See after visit summary from hospitalization: Follow up Dr. Lisa Gama (PCP) and Dr. Fatimah Magana (ortho) in 1 week. Disposition:  Home with home health with PT/OT. Discharge Medications:   Ashu Cummings   Home Medication Instructions WNE:[id number]    Printed on:02/24/20 4292   Medication Information                      atorvastatin (LIPITOR) 80 MG tablet  Take 0.5 tablets by mouth nightly             blood glucose monitor strips  Test 2 times a day & as needed for symptoms of irregular blood glucose. Calcium Carbonate-Vitamin D (CALCIUM 500/D) 500-125 MG-UNIT TABS  Take 1 tablet by mouth 2 times daily              carvedilol (COREG) 6.25 MG tablet  Take 1 tablet by mouth 2 times daily             citalopram (CELEXA) 10 MG tablet  Take 1 tablet by mouth daily             fenofibrate micronized (LOFIBRA) 134 MG capsule  Take 1 capsule by mouth every morning (before breakfast)             ferrous sulfate 325 (65 Fe) MG tablet  Take 1 tablet by mouth daily (with breakfast)             Lancets MISC  1 each by Does not apply route daily             lidocaine (XYLOCAINE) 5 % ointment  4-5 times a day to your right thigh for pain. nitroGLYCERIN (NITROSTAT) 0.4 MG SL tablet  Place 1 tablet under the tongue every 5 minutes as needed for Chest pain             oxyCODONE (ROXICODONE) 5 MG immediate release tablet  Take 1 tablet by mouth every 4 hours as needed for Pain for up to 7 days.              pantoprazole (PROTONIX) 40 MG tablet  Take 1 tablet by mouth 2 times daily (before meals)             polyethylene glycol (GLYCOLAX) packet  Take 17 g by mouth daily             pramipexole (MIRAPEX) 1 MG tablet  Take 1.5 tablets by mouth daily             rivaroxaban (XARELTO) 10 MG TABS tablet  Take 1 tablet by mouth daily             SITagliptin (JANUVIA) 100 MG tablet  Take 0.5 tablets by mouth daily             VITAMIN D, ERGOCALCIFEROL, PO  Take by mouth                 Zach Ospina MD

## 2020-02-24 NOTE — PROGRESS NOTES
[facility]: SAROJ HINTON   ACUTE REHABILITATION OCCUPATIONAL THERAPY  DAILY NOTE    Date: 20  Patient Name: Laurie العلي      Room: [facility]3/3624-82    MRN: [id number]   : 1950  (72 y.o.)  Gender: female   Referring Practitioner: Dr. Rae/Dr. Anatoly Vides  Diagnosis: Neurological disorder; Lumbar L4-5 PLIF Vertebroplasty L4 & L5 2/10/20       Restrictions  Restrictions/Precautions: Fall Risk, Surgical Protocols  Implants present? : Metal implants  Other position/activity restrictions: up with assistance  Required Braces or Orthoses?: No  Equipment Used: Other, Wheelchair, Bed    Subjective  Comments: pt pleasant and cooperative this date   Patient Currently in Pain: Yes  Pain Level: 7  Pain Location: Back  Pain Orientation: Lower  Restrictions/Precautions: Fall Risk;Surgical Protocols  Overall Orientation Status: Within Functional Limits     Pain Assessment  Pain Assessment: 0-10  Pain Level: 7  Pain Type: Surgical pain  Pain Location: Back  Pain Orientation: Lower  Pain Descriptors: Pressure, Shooting    Objective  Cognition  Overall Cognitive Status: Impaired  Attention Span: Attends with cues to redirect  Memory: Decreased short term memory;Decreased recall of recent events  Following Commands: Follows multistep commands with repetition  Safety Judgement: Decreased awareness of need for safety  Balance  Sitting Balance: Modified independent   Standing Balance: Supervision     Transfers  Sit to stand: Supervision(wc to standing at sink with UE support )  Stand to sit: Supervision    Standing Balance  Time: 5 min 4 min 2 min   Activity: ADL  Comment: 0-1 UE support standing at sink     Functional Mobility  Functional - Mobility Device: Wheelchair  Activity: To/from bathroom;Transport items; Retrieve items(around room )  Assist Level: Supervision  Shower Transfers  Shower Transfers Comments: refused shower this date             Light Housekeeping  Light Housekeeping Level: Wheelchair  Light Housekeeping

## 2020-02-24 NOTE — DISCHARGE SUMMARY
Pt received education on discharge summary, all questions answered. Pt discharged to home with home PT/OT and nursing.

## 2020-02-24 NOTE — PROGRESS NOTES
Physical Therapy  [address] [facility]   Acute Rehabilitation Physical Therapy Progress Note    Date: 20  Patient Name: Kasandra Valentine       Room: 1919/2175-63  MRN: [id number]   Account: [de-identified]   : 1950  (72 y.o.) Gender: female     Referring Practitioner: Dr. Rae/Dr. Lonnell Snellen  Diagnosis: Neurological disorder; Lumbar L4-5 PLIF Vertebroplasty L4 & L5 2/10/20  Past Medical History:  has a past medical history of Allergic rhinitis, cause unspecified, Back pain, Bowel obstruction (HCC), C. difficile diarrhea, CAD (coronary artery disease), Cellulitis, Cellulitis, Diverticulosis of colon (without mention of hemorrhage), GERD (gastroesophageal reflux disease), History of MI (myocardial infarction), History of ovarian cyst, History of peritonitis, HTN (hypertension), Hx of blood clots, Hyperlipidemia, Intestinal or peritoneal adhesions with obstruction (postoperative) (postinfection) ([address] Kidney infection, Lateral epicondylitis  of elbow, Muscle strain, Other abnormal glucose, PONV (postoperative nausea and vomiting), Pre-diabetes, Restless legs syndrome (RLS), TIA (transient ischemic attack), Vitamin D deficiency, and Wears glasses. Past Surgical History:   has a past surgical history that includes Ovary removal (); colectomy (); Appendectomy (); Ovary removal (); Hysterectomy (); Bunionectomy (Left); sinus surgery (); Colonoscopy; other surgical history (14); cyst removal (Right); Wrist fracture surgery (Left, 3/5/2019); Upper gastrointestinal endoscopy (N/A, 2019); Upper gastrointestinal endoscopy (N/A, 2019); Upper gastrointestinal endoscopy (N/A, 2019); Abdomen surgery (); Cardiac catheterization; Cardiac catheterization (); and lumbar fusion (N/A, 2/10/2020).   Additional Pertinent Hx: Kasandra Valentine is 72 y.o.,  female, undergoing preadmission testing for Spondylolisthesis with scheduled Lumbar L4-L5 Posterior Decompression/ Fusion. Pt has lumbar degenerative disc disease. The initial symptoms started 4 years ago. 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 Pain described as constant, aching rating 5/10. Pain is aggravated with standing. Sitting relieves pain. Pt c/o of pain in the lumbar spine area, radiates to the lower limbs worse on the left side. Pt reports pain radiates down left thigh to knee and down right thigh causing tingling. Pt reports difficulty with ambulation due to back pain and \"balance issues\". Uses a cane for ambulation. Has sustained multiple falls with last fall being within last week. No redness, swelling or rashes. Pt has tried injections, physical therapy and NSAIDS. She can no longer take NSAIDS as she developed a bleeding ulcer and required a blood transfusion in August of 2018. Pt currently goes to pain management and has been on Percocet with minimal relief of back pain. Pt admitted to [facility] 2/12/20. Overall Orientation Status: Within Normal Limits  Restrictions/Precautions  Restrictions/Precautions: Fall Risk;Surgical Protocols  Required Braces or Orthoses?: No  Implants present? : Metal implants  Position Activity Restriction  Other position/activity restrictions: up with assistance    Subjective: Patient reports she feels ready for home DC this PM.  Comments: Continues to have slow movements and very guarded. Vital Signs  Patient Currently in Pain: Yes  Pain Assessment: 0-10  Pain Level: 5  Pain Type: Chronic pain;Surgical pain  Pain Location: Back  Pain Orientation: Lower  Non-Pharmaceutical Pain Intervention(s): Ambulation/Increased Activity  Response to Pain Intervention: Patient Satisfied        Patient Observation  Observations: Decreased safety awareness. Bed Mobility:   Rolling: Modified independent  Supine to Sit: Modified independent  Sit to Supine: Modified independent  Scooting: Modified independent  Comment: Slower pace;   Safety concerns with bed mobility and & procurement, Positioning    Conditions Requiring Skilled Therapeutic Intervention  Body structures, Functions, Activity limitations: Decreased functional mobility ; Decreased strength;Decreased safe awareness;Decreased cognition;Decreased balance; Increased pain;Decreased posture;Decreased ADL status; Decreased endurance  Prognosis: Good  REQUIRES PT FOLLOW UP: Yes  Discharge Recommendations: Home with assist PRN;Patient would benefit from continued therapy after discharge    Goals  Short term goals  Time Frame for Short term goals: 1 week  Short term goal 1: Pt to demonstrate CGA/SBA bed mobility with good log rolling technique. Short term goal 2: Pt to demonstrate min/CGA x1 transfers with good technique. Short term goal 3: Pt to amb 76' with RW CGA. Short term goal 4: Pt to 3-5 steps, min x1 with 1-2 HR. Short term goal 5: Pt to improve standing tolerance to at least 4-5 minutes with 1 UE support, CGA/SBA. Short term goal 6: Pt to improve sitting balance (Static/dynamic) to Good with good posture. Long term goals  Time Frame for Long term goals : by D/C  Long term goal 1: Pt to progress to Mod I bed mobility. Long term goal 2: Pt to perform Mod I transfers with device. Long term goal 3: Pt to amb 150' with device, Mod I.  Long term goal 4: Pt to perform 12 stairs per home set up, 1 HR, SBA with device prn. Long term goal 5: Pt to improve B LE strength by 1/2 MMG. Long term goal 6: Pt to progress to 2MWT with device Mod I at least 100'. Long term goal 7: Pt to improve Tinetti to at least 20/28 to reduce fall risk and improve static/dynamic balance.        02/24/20 1040   PT Individual Minutes   Time In 1050   Time Out 1110   Minutes 20   PT Concurrent Minutes   Time In 4297   Time Out 1050   Minutes 10       Electronically signed by Evens Guthrie PTA on 2/24/20 at 4:44 PM

## 2020-02-25 ENCOUNTER — OFFICE VISIT (OUTPATIENT)
Dept: ORTHOPEDIC SURGERY | Age: 70
End: 2020-02-25

## 2020-02-25 ENCOUNTER — CARE COORDINATION (OUTPATIENT)
Dept: CASE MANAGEMENT | Age: 70
End: 2020-02-25

## 2020-02-25 PROCEDURE — 1111F DSCHRG MED/CURRENT MED MERGE: CPT

## 2020-02-25 PROCEDURE — 99024 POSTOP FOLLOW-UP VISIT: CPT | Performed by: ORTHOPAEDIC SURGERY

## 2020-02-25 NOTE — CARE COORDINATION
Appointments   Date Time Provider Britni Marshall   3/31/2020 10:10 AM Tea Velazquez MD [facility]   [facility]10[facility]2020 10[facility]30 [facility]   5/18/2020  1:00 PM Benja Meza, [address] [place] [address],

## 2020-02-25 NOTE — PROGRESS NOTES
Patient ID: Ezequiel Holder is a 72 y.o. female    Chief Compliant:  Chief Complaint   Patient presents with    Post-Op Check     lumbar fusion         History of Present Illness:  72 y.o. female status post L4-5 PLIF with cement augmentation on 2/10/20. Patient's leg pain has improved. Her walking has improved, slow progress, walks with cane for assistance. Staples discontinued. Review of Systems   Constitutional: Negative for fever, sweats, weight loss, recent injury, or recent illness  Neurological: Negative for  headaches, numbness, or focal weakness. Integumentary: Negative for abrasion, laceration, rash, ecchymosis. Musculoskeletal: Positive for Post-Op Check (lumbar fusion )         Physical Exam:  Constitutional: Patient is oriented to person, place, and time. Patient appears well-developed and well nourished. HENT: Negative otherwise noted  Neck: Normal range of motion Neck supple. Respiratory/Cardio: Effort normal. No respiratory distress. Musculoskeletal: Walks with cane  Neurological: Patient is alert and oriented to person, place, and time. Normal strenght. No sensory deficit. Skin: Skin is warm and dry. Incision is well healing    Nursing note and vitals reviewed. Labs and Imaging:     XR taken today: AP and lateral lumbar spine status post L4-5 PLIF with vertebroplasty's at L4 and L5 some subsidence of the cage into the inferior endplate of L4 no change with respect to intraoperative imaging        Assessment and Plan:  1. Acquired spondylolisthesis    2. Lumbar facet arthropathy    3. Spondylosis of lumbar region without myelopathy or radiculopathy    4. Lumbar degenerative disc disease    5. Chronic left-sided low back pain with left-sided sciatica    6. Spinal stenosis of lumbar region with neurogenic claudication    7. Facet arthritis of lumbar region        Status post L4-5 PLIF with cement augmentation on 2/10/20. Follow up 4 weeks.        Provider Attestation:  SUSI Patrick Matthews, personally performed the services described in this documentation. All medical record entries made by the scribe were at my direction and in my presence. I have reviewed the chart and discharge instructions and agree that the records reflect my personal performance and is accurate and complete. Patrick Miranda MD 02/25/20       Scribe Attestation:  By signing my name below, Tiffany Jarvisregor, attest that this documentation has been prepared under the direction and in the presence of Dr. Francisco Redman. Electronically signed: Wil Manuel, 2/25/20     Please note that this chart was generated using voice recognition Dragon dictation software. Although every effort was made to ensure the accuracy of this automated transcription, some errors in transcription may have occurred.

## 2020-02-27 ENCOUNTER — TELEPHONE (OUTPATIENT)
Dept: INTERNAL MEDICINE CLINIC | Age: 70
End: 2020-02-27

## 2020-02-27 NOTE — TELEPHONE ENCOUNTER
[facility] Trans[facility] Initial Follow Up Call    Outreach made within 2 business days of discharge: Yes    Patient: Soni Cox Patient : 1950   MRN: [id number]  Reason for Admission: There are no discharge diagnoses documented for the most recent discharge. Discharge Date: 20       Spoke with: [facility]     Discharge department/facility: [facility] Interactive Patient Contact:  Was patient able to fill all prescriptions: Yes  Was patient instructed to bring all medications to the follow-up visit: Yes  Is patient taking all medications as directed in the discharge summary?  Yes and No  Does patient understand their discharge instructions: Yes  Does patient have questions or concerns that need addressed prior to 7-14 day follow up office visit: no PATIENT IS FOLLOWING UP WITH DR. Galen Solorzano    Scheduled appointment with PCP within 7-14 days    Follow Up  Future Appointments   Date Time Provider Britni Marshall   3/31/2020 10:10 AM Danish Ji MD [facility]   2020  3:00 PM Richard Peralta MD [facility]   2020  1:00 PM Dangelo Love, [address] [place] [address]

## 2020-03-02 ENCOUNTER — TELEPHONE (OUTPATIENT)
Dept: ORTHOPEDIC SURGERY | Age: 70
End: 2020-03-02

## 2020-03-02 ENCOUNTER — CARE COORDINATION (OUTPATIENT)
Dept: CASE MANAGEMENT | Age: 70
End: 2020-03-02

## 2020-03-02 NOTE — TELEPHONE ENCOUNTER
Lisinopril was held , because creatinine increased to 1.8 from 1.3 ,   Repeat BMP  Order placed   give her appointment

## 2020-03-02 NOTE — TELEPHONE ENCOUNTER
Patient calling in due to see fell into her recliner chair on Thursday 2/27, she has been having groin pain since the fall. It's not getting better or worse, but she is stating she is in a lot of pain. She had L4-5 PLIF on 2/10, her next follow up appointment is 3/31.     Please advise:

## 2020-03-02 NOTE — TELEPHONE ENCOUNTER
Medication   lisinopril (PRINIVIL;ZESTRIL) 10 MG tablet [[id number]]   lisinopril (PRINIVIL;ZESTRIL) 10 MG tablet [[id number]]  DISCONTINUED     Order Details   Dose: 10 mg Route: Oral Frequency: DAILY   Dispense Quantity: 90 tablet Refills: 3 Fills remaining: --           Sig: Take 1 tablet by mouth daily          Discontinue Date:  2/24/2020 08:31           Patient called today questioning on to why she has been discontinued from taking the lisinopril, states that her BP has been elevated.     Looks like Lisinopril was discontinued up hospital discharge

## 2020-03-02 NOTE — CARE COORDINATION
[facility] Transitions Follow Up Call    3/2/2020    Patient: Lorie Lombard  Patient : 1950   MRN: [id number]  Reason for Admission: s/p spinal fusion  Discharge Date: 20 RARS: Readmission Risk Score: 26         Spoke with:   Message left. Stated who I was, representing the [facility], Care Transitions Team. Requested to place a call to their Physician with any immediate needs/questions/concerns.       Future Appointments   Date Time Provider Britni Marshall   3/31/2020 10:10 AM Sheryle Abbey, MD [facility]   2020  3:00 PM Carlos Manuel Thakkar MD [facility]   2020  1:00 PM Marika Beltran MD [address], [place]

## 2020-03-04 ENCOUNTER — OFFICE VISIT (OUTPATIENT)
Dept: INTERNAL MEDICINE CLINIC | Age: 70
End: 2020-03-04
Payer: MEDICARE

## 2020-03-04 VITALS
DIASTOLIC BLOOD PRESSURE: 90 MMHG | OXYGEN SATURATION: 95 % | HEART RATE: 82 BPM | WEIGHT: 162 LBS | BODY MASS INDEX: 28.7 KG/M2 | SYSTOLIC BLOOD PRESSURE: 178 MMHG | HEIGHT: 63 IN

## 2020-03-04 LAB — HBA1C MFR BLD: 5.7 %

## 2020-03-04 PROCEDURE — 1111F DSCHRG MED/CURRENT MED MERGE: CPT | Performed by: INTERNAL MEDICINE

## 2020-03-04 PROCEDURE — 99495 TRANSJ CARE MGMT MOD F2F 14D: CPT | Performed by: INTERNAL MEDICINE

## 2020-03-04 PROCEDURE — 83036 HEMOGLOBIN GLYCOSYLATED A1C: CPT | Performed by: INTERNAL MEDICINE

## 2020-03-04 RX ORDER — LISINOPRIL 20 MG/1
20 TABLET ORAL DAILY
Qty: 30 TABLET | Refills: 3 | Status: SHIPPED | OUTPATIENT
Start: 2020-03-04 | End: 2020-08-07 | Stop reason: SDUPTHER

## 2020-03-04 ASSESSMENT — PATIENT HEALTH QUESTIONNAIRE - PHQ9
2. FEELING DOWN, DEPRESSED OR HOPELESS: 1
SUM OF ALL RESPONSES TO PHQ QUESTIONS 1-9: 1
1. LITTLE INTEREST OR PLEASURE IN DOING THINGS: 0
SUM OF ALL RESPONSES TO PHQ9 QUESTIONS 1 & 2: 1
SUM OF ALL RESPONSES TO PHQ QUESTIONS 1-9: 1

## 2020-03-04 NOTE — PROGRESS NOTES
Subjective:      Chief Complaint   Patient presents with    Follow-Up from Hospital     F/U from [address], Pt states she has had a fall since surgery and was told she injuried groin     Hypertension     Pt states her BP has been running high since surgery      Patient ID: Kosta Dior is a 72 y.o. female. Visit Information    Have you changed or started any medications since your last visit including any over-the-counter medicines, vitamins, or herbal medicines? no   Are you having any side effects from any of your medications? -  no  Have you stopped taking any of your medications? Is so, why? -  no    Have you seen any other physician or provider since your last visit? Yes - Records Obtained  Have you had any other diagnostic tests since your last visit? Yes - Records Obtained  Have you been seen in the emergency room and/or had an admission to a hospital since we last saw you? Yes - Records Obtained  Have you had your routine dental cleaning in the past 6 months? no    Have you activated your [employer] account? If not, what are your barriers?  Yes     Patient Care Team:  Vanda Deutsch MD as PCP - General (Internal Medicine)  Vanda Deutsch MD as [facility]CP [facility]  Stacy Villatoro MD as Consulting Physician (Gastroenterology)  Luisana Zhong RN as Care Transitions Nurse    Medical History Review  Past Medical, Family, and Social History reviewed and does not contribute to the patient presenting condition    Health Maintenance   Topic Date Due    Diabetic foot exam  01/18/1961    Diabetic retinal exam  01/18/1961    DTaP/Tdap/Td vaccine (1 - Tdap) 01/18/1962    Hepatitis B vaccine (1 of 3 - Risk 3-dose series) 01/18/1970    Diabetic microalbuminuria test  08/05/2016    Annual Wellness Visit (AWV)  05/29/2019    Breast cancer screen  08/04/2019    Flu vaccine (1) 09/01/2019    Shingles Vaccine (1 of 2) 10/10/2020 (Originally 1/18/2001)    Lipid screen  05/20/2020    A1C test (Diabetic or Prediabetic)  10/02/2020    Potassium monitoring  02/24/2021    Creatinine monitoring  02/24/2021    Colon cancer screen colonoscopy  10/07/2026    DEXA (modify frequency per FRAX score)  Completed    Pneumococcal 65+ years Vaccine  Completed    Hepatitis C screen  Completed    Hepatitis A vaccine  Aged Out    Hib vaccine  Aged Out    Meningococcal (ACWY) vaccine  Aged Out       HPI    Review of Systems    Objective:   Physical Exam    Assessment / Plan:

## 2020-03-06 RX ORDER — OXYCODONE HYDROCHLORIDE AND ACETAMINOPHEN 5; 325 MG/1; MG/1
1 TABLET ORAL EVERY 6 HOURS PRN
Qty: 28 TABLET | Refills: 0 | Status: SHIPPED | OUTPATIENT
Start: 2020-03-06 | End: 2020-03-13

## 2020-03-10 ENCOUNTER — OFFICE VISIT (OUTPATIENT)
Dept: ORTHOPEDIC SURGERY | Age: 70
End: 2020-03-10

## 2020-03-10 PROCEDURE — 99024 POSTOP FOLLOW-UP VISIT: CPT | Performed by: ORTHOPAEDIC SURGERY

## 2020-03-10 ASSESSMENT — ENCOUNTER SYMPTOMS
EYE PAIN: 0
BLOOD IN STOOL: 0
ABDOMINAL DISTENTION: 0
EYE REDNESS: 0
DIARRHEA: 0
COUGH: 0
SHORTNESS OF BREATH: 0
APNEA: 0
CHEST TIGHTNESS: 0
EYE ITCHING: 0
BACK PAIN: 1
CHOKING: 0
ABDOMINAL PAIN: 0
EYE DISCHARGE: 0
CONSTIPATION: 0
COLOR CHANGE: 0

## 2020-03-10 NOTE — PROGRESS NOTES
proximal vein of both lower extremities (HCC)    Chronic diastolic heart failure (HCC)    Neurogenic claudication due to lumbar spinal stenosis    Sacroiliitis (HCC)    Stage 3 chronic kidney disease (HCC)    Stage 3 chronic kidney disease (HCC)    Type 2 diabetes mellitus with circulatory disorder, without long-term current use of insulin (HCC)    Chronic deep vein thrombosis (DVT) of popliteal vein of right lower extremity (HCC)    Closed fracture of left wrist    B12 deficiency    Iron deficiency anemia secondary to inadequate dietary iron intake    Diarrhea due to malabsorption    Closed head injury    Scalp laceration    Abnormal finding on imaging    Other irritable bowel syndrome    Frequent falls    Double vision    Muscle soreness    GI bleed    Peptic ulcer    Melena    PUD (peptic ulcer disease)    Absolute anemia    Acute blood loss anemia    Acute renal failure (HCC)    Clostridium difficile infection    Acquired spondylolisthesis    Spinal stenosis of lumbar region with neurogenic claudication    Acute deep vein thrombosis (DVT) of proximal vein of left lower extremity (HCC)    Leg swelling    Back pain    Neurological disorder       Allergies   Allergen Reactions    Mobic [Meloxicam]     Bactrim [Sulfamethoxazole-Trimethoprim] Other (See Comments)     sepsis    Codeine Itching    Seasonal        Medications listed as ordered at the time of discharge from hospital   Levy ROLLINS   Home Medication Instructions MEENAKSHI:    Printed on:03/10/20 0920   Medication Information                      atorvastatin (LIPITOR) 80 MG tablet  Take 0.5 tablets by mouth nightly             blood glucose monitor strips  Test 2 times a day & as needed for symptoms of irregular blood glucose.              Calcium Carbonate-Vitamin D (CALCIUM 500/D) 500-125 MG-UNIT TABS  Take 1 tablet by mouth 2 times daily              carvedilol (COREG) 6.25 MG tablet  Take 1 tablet by mouth 2 times (65 Fe) MG tablet Take 1 tablet by mouth daily (with breakfast) 90 tablet 2    VITAMIN D, ERGOCALCIFEROL, PO Take by mouth      citalopram (CELEXA) 10 MG tablet Take 1 tablet by mouth daily 90 tablet 3    fenofibrate micronized (LOFIBRA) 134 MG capsule Take 1 capsule by mouth every morning (before breakfast) 90 capsule 3    pramipexole (MIRAPEX) 1 MG tablet Take 1.5 tablets by mouth daily 145 tablet 3    atorvastatin (LIPITOR) 80 MG tablet Take 0.5 tablets by mouth nightly 90 tablet 3    pantoprazole (PROTONIX) 40 MG tablet Take 1 tablet by mouth 2 times daily (before meals) 90 tablet 1    Lancets MISC 1 each by Does not apply route daily 100 each 3    blood glucose monitor strips Test 2 times a day & as needed for symptoms of irregular blood glucose. 100 strip 5    SITagliptin (JANUVIA) 100 MG tablet Take 0.5 tablets by mouth daily 90 tablet 3    lidocaine (XYLOCAINE) 5 % ointment 4-5 times a day to your right thigh for pain. 240 g 3    nitroGLYCERIN (NITROSTAT) 0.4 MG SL tablet Place 1 tablet under the tongue every 5 minutes as needed for Chest pain 75 tablet 3    Calcium Carbonate-Vitamin D (CALCIUM 500/D) 500-125 MG-UNIT TABS Take 1 tablet by mouth 2 times daily           Medications patient taking as of now reconciled against medications ordered at time of hospital discharge: Yes    Chief Complaint   Patient presents with    Follow-Up from Hospital     F/U from [facility], Pt states she has had a fall since surgery and was told she injuried groin     Hypertension     Pt states her BP has been running high since surgery        HPI-patient is here for transitional care. She was recently admitted at [facility] with spinal stenosis, she underwent back surgery, patient had readings of low blood pressure, worsening creatinine, her lisinopril was stopped. She noticed increased reading of blood pressure.   She has home health nurse which also noticed readings of high blood pressure at home  Patient had history of DVT in the past, in the hospital she had ultrasound of legs concerning for chronic DVT in Gastromims vein on right side, she was taking Lovenox shots at home. Inpatient course: Discharge summary reviewed- see chart. Interval history/Current status: Improved  Review of Systems   Constitutional: Negative for activity change, appetite change, chills, diaphoresis, fatigue and fever. HENT: Negative for congestion, dental problem, drooling and ear discharge. Eyes: Negative for pain, discharge, redness and itching. Respiratory: Negative for apnea, cough, choking, chest tightness and shortness of breath. Cardiovascular: Negative for chest pain and leg swelling. Gastrointestinal: Negative for abdominal distention, abdominal pain, blood in stool, constipation and diarrhea. Endocrine: Negative for cold intolerance and heat intolerance. Genitourinary: Negative for difficulty urinating, dysuria, enuresis, flank pain and frequency. Musculoskeletal: Positive for back pain. Negative for arthralgias, gait problem and joint swelling. Skin: Negative for color change, pallor and rash. Neurological: Negative for dizziness, facial asymmetry, light-headedness, numbness and headaches. Psychiatric/Behavioral: Negative for agitation, behavioral problems, confusion, decreased concentration and dysphoric mood. Vitals:    03/04/20 1522 03/04/20 1531   BP: (!) 184/92 (!) 178/90   Pulse: 82    SpO2: 95%    Weight: 162 lb (73.5 kg)    Height: 5' 2.99\" (1.6 m)      Body mass index is 28.7 kg/m². Wt Readings from Last 3 Encounters:   03/04/20 162 lb (73.5 kg)   02/15/20 167 lb (75.8 kg)   02/12/20 170 lb 13.7 oz (77.5 kg)     BP Readings from Last 3 Encounters:   03/04/20 (!) 178/90   02/24/20 (!) 129/56   02/12/20 (!) 142/40       Physical Exam  Constitutional:       Appearance: She is well-developed. She is not diaphoretic. HENT:      Head: Normocephalic and atraumatic. side effects of prescribed medications. Barriers to medication compliance addressed. All patient questions answered. Pt voiced understanding. MD [facility]  3/10/2020, 2:42 PM    Please note that this chart was generated using voice recognition Dragon dictation software. Although every effort was made to ensure the accuracy of this automated transcription, some errors in transcription may have occurred.     Assessment/Plan:    Medical Decision Making: moderate complexity

## 2020-03-10 NOTE — PROGRESS NOTES
left-sided low back pain with left-sided sciatica    10. Leg swelling        Status post L4-5 PLIF with cement augmentation. Patient is status post recent fall hitting her right hip. Patient has had significant difficulty walking due to pain. Encouraged to get up a walk. Follow up in 4 weeks. Scribe Attestation:  By signing my name below, Ron Montes De Oca, attest that this documentation has been prepared under the direction and in the presence of Dr. Vince Hurtado. Electronically signed: Wil Reyes, 3/10/20     Please note that this chart was generated using voice recognition Dragon dictation software. Although every effort was made to ensure the accuracy of this automated transcription, some errors in transcription may have occurred.

## 2020-03-11 ENCOUNTER — TELEPHONE (OUTPATIENT)
Dept: ORTHOPEDIC SURGERY | Age: 70
End: 2020-03-11

## 2020-03-11 NOTE — TELEPHONE ENCOUNTER
You told the patient at her appt she had to wait till Monday for a refill and she called back stating that she can not wait that long she is in severe pain-   Please address       Last Rx- 3-5-2020   Status post L4-5 PLIF with cement augmentation.      Patient is status post recent fall hitting her right hip. Patient has had significant difficulty walking due to pain. Encouraged to get up a walk.      Follow up in 4 weeks.

## 2020-03-12 RX ORDER — HYDROCODONE BITARTRATE AND ACETAMINOPHEN 5; 325 MG/1; MG/1
1 TABLET ORAL EVERY 6 HOURS PRN
Qty: 28 TABLET | Refills: 0 | Status: SHIPPED | OUTPATIENT
Start: 2020-03-12 | End: 2020-03-18 | Stop reason: SDUPTHER

## 2020-03-16 RX ORDER — PANTOPRAZOLE SODIUM 40 MG/1
40 TABLET, DELAYED RELEASE ORAL
Qty: 90 TABLET | Refills: 3 | Status: ON HOLD | OUTPATIENT
Start: 2020-03-16 | End: 2020-06-05 | Stop reason: SDUPTHER

## 2020-03-16 RX ORDER — PRAMIPEXOLE DIHYDROCHLORIDE 1 MG/1
1.5 TABLET ORAL DAILY
Qty: 145 TABLET | Refills: 3 | Status: SHIPPED | OUTPATIENT
Start: 2020-03-16 | End: 2020-07-20 | Stop reason: SDUPTHER

## 2020-03-16 RX ORDER — ATORVASTATIN CALCIUM 80 MG/1
40 TABLET, FILM COATED ORAL NIGHTLY
Qty: 90 TABLET | Refills: 3 | Status: SHIPPED | OUTPATIENT
Start: 2020-03-16 | End: 2020-07-20 | Stop reason: SDUPTHER

## 2020-03-16 NOTE — TELEPHONE ENCOUNTER
Medication: atorvastatin  Pantoprazole  pramipexole  Last visit: 03/04/20  Next visit: 4/8/2020  Last refill: 11/2019  Pharmacy: meds by mail

## 2020-03-17 ENCOUNTER — TELEPHONE (OUTPATIENT)
Dept: INTERNAL MEDICINE CLINIC | Age: 70
End: 2020-03-17

## 2020-03-18 ENCOUNTER — TELEPHONE (OUTPATIENT)
Dept: INTERNAL MEDICINE CLINIC | Age: 70
End: 2020-03-18

## 2020-03-18 RX ORDER — HYDROCODONE BITARTRATE AND ACETAMINOPHEN 5; 325 MG/1; MG/1
1 TABLET ORAL EVERY 6 HOURS PRN
Qty: 28 TABLET | Refills: 0 | Status: SHIPPED | OUTPATIENT
Start: 2020-03-18 | End: 2020-03-25

## 2020-03-19 NOTE — TELEPHONE ENCOUNTER
Pt states she did not go to the ED she did call EMS and she will have BP checked today and give office a call in a few days if BP is still elevated

## 2020-03-23 ENCOUNTER — TELEPHONE (OUTPATIENT)
Dept: INTERNAL MEDICINE CLINIC | Age: 70
End: 2020-03-23

## 2020-03-23 RX ORDER — HYDROCODONE BITARTRATE AND ACETAMINOPHEN 5; 325 MG/1; MG/1
1 TABLET ORAL EVERY 6 HOURS PRN
Qty: 28 TABLET | Refills: 0 | Status: CANCELLED | OUTPATIENT
Start: 2020-03-23 | End: 2020-03-30

## 2020-03-23 NOTE — TELEPHONE ENCOUNTER
Pt had surgery on her back on 2/10/20. She states that she can barely walk and would like a [place] refill. She spoke with Ze Kearney and Shaquille Mccoy last week (3/11/ & 3/13) about getting a refill. Her next OV is scheduled for 3/31/20. Med attached. She requested that we call her back once the script is either approved or denied.

## 2020-03-24 ENCOUNTER — OFFICE VISIT (OUTPATIENT)
Dept: ORTHOPEDIC SURGERY | Age: 70
End: 2020-03-24

## 2020-03-24 ENCOUNTER — TELEPHONE (OUTPATIENT)
Dept: ORTHOPEDIC SURGERY | Age: 70
End: 2020-03-24

## 2020-03-24 PROCEDURE — 99024 POSTOP FOLLOW-UP VISIT: CPT | Performed by: ORTHOPAEDIC SURGERY

## 2020-03-24 RX ORDER — HYDROCODONE BITARTRATE AND ACETAMINOPHEN 5; 325 MG/1; MG/1
1 TABLET ORAL EVERY 6 HOURS PRN
Qty: 28 TABLET | Refills: 0 | Status: SHIPPED | OUTPATIENT
Start: 2020-03-24 | End: 2020-03-24 | Stop reason: SDUPTHER

## 2020-03-24 RX ORDER — HYDROCODONE BITARTRATE AND ACETAMINOPHEN 5; 325 MG/1; MG/1
1 TABLET ORAL EVERY 6 HOURS PRN
Qty: 28 TABLET | Refills: 0 | Status: SHIPPED | OUTPATIENT
Start: 2020-03-24 | End: 2020-03-25 | Stop reason: SDUPTHER

## 2020-03-24 NOTE — PROGRESS NOTES
normal. No respiratory distress. Musculoskeletal:      Comments: Normal gait     Skin:     General: Skin is warm and dry. Neurological:      Mental Status: She is alert and oriented to person, place, and time. Sensory: No sensory deficit. Psychiatric:         Behavior: Behavior normal.         Thought Content: Thought content normal.       Diagnostic x-rays AP and lateral lumbar spine status post L4-5 PLIF patient appears to have a a new spondylolisthesis at L5-S1    Assessment:     Encounter Diagnoses   Name Primary?  Spinal stenosis, lumbar region, without neurogenic claudication Yes    Acquired spondylolisthesis     Lumbar facet arthropathy     Spondylosis of lumbar region without myelopathy or radiculopathy     Chronic left-sided low back pain with left-sided sciatica          1. Spinal stenosis, lumbar region, without neurogenic claudication    2. Acquired spondylolisthesis    3. Lumbar facet arthropathy    4. Spondylosis of lumbar region without myelopathy or radiculopathy    5. Chronic left-sided low back pain with left-sided sciatica        Plan:     CT myelogram lumbar spine    Follow-up post CT myelogram    Orders Placed This Encounter   Procedures    XR LUMBAR SPINE (2-3 VIEWS)     Standing Status:   Future     Number of Occurrences:   1     Standing Expiration Date:   3/24/2021        Ronda Jackson MD    Please note that this chart was generated using voicerecognition Dragon dictation software. Although every effort was made to ensurethe accuracy of this automated transcription, some errors in transcription may haveoccurred.

## 2020-03-25 ENCOUNTER — OFFICE VISIT (OUTPATIENT)
Dept: INTERNAL MEDICINE CLINIC | Age: 70
End: 2020-03-25
Payer: MEDICARE

## 2020-03-25 VITALS
WEIGHT: 149 LBS | TEMPERATURE: 97.8 F | HEIGHT: 63 IN | SYSTOLIC BLOOD PRESSURE: 150 MMHG | HEART RATE: 87 BPM | BODY MASS INDEX: 26.4 KG/M2 | DIASTOLIC BLOOD PRESSURE: 72 MMHG | OXYGEN SATURATION: 98 %

## 2020-03-25 PROBLEM — N18.30 STAGE 3 CHRONIC KIDNEY DISEASE (HCC): Status: RESOLVED | Noted: 2019-03-18 | Resolved: 2020-03-24

## 2020-03-25 PROCEDURE — 1111F DSCHRG MED/CURRENT MED MERGE: CPT | Performed by: INTERNAL MEDICINE

## 2020-03-25 PROCEDURE — G8399 PT W/DXA RESULTS DOCUMENT: HCPCS | Performed by: INTERNAL MEDICINE

## 2020-03-25 PROCEDURE — G8427 DOCREV CUR MEDS BY ELIG CLIN: HCPCS | Performed by: INTERNAL MEDICINE

## 2020-03-25 PROCEDURE — 4040F PNEUMOC VAC/ADMIN/RCVD: CPT | Performed by: INTERNAL MEDICINE

## 2020-03-25 PROCEDURE — 2022F DILAT RTA XM EVC RTNOPTHY: CPT | Performed by: INTERNAL MEDICINE

## 2020-03-25 PROCEDURE — G8484 FLU IMMUNIZE NO ADMIN: HCPCS | Performed by: INTERNAL MEDICINE

## 2020-03-25 PROCEDURE — 3017F COLORECTAL CA SCREEN DOC REV: CPT | Performed by: INTERNAL MEDICINE

## 2020-03-25 PROCEDURE — G8417 CALC BMI ABV UP PARAM F/U: HCPCS | Performed by: INTERNAL MEDICINE

## 2020-03-25 PROCEDURE — 99214 OFFICE O/P EST MOD 30 MIN: CPT | Performed by: INTERNAL MEDICINE

## 2020-03-25 PROCEDURE — 1036F TOBACCO NON-USER: CPT | Performed by: INTERNAL MEDICINE

## 2020-03-25 PROCEDURE — 3044F HG A1C LEVEL LT 7.0%: CPT | Performed by: INTERNAL MEDICINE

## 2020-03-25 PROCEDURE — 1090F PRES/ABSN URINE INCON ASSESS: CPT | Performed by: INTERNAL MEDICINE

## 2020-03-25 PROCEDURE — 1123F ACP DISCUSS/DSCN MKR DOCD: CPT | Performed by: INTERNAL MEDICINE

## 2020-03-25 ASSESSMENT — ENCOUNTER SYMPTOMS
EYE PAIN: 0
SHORTNESS OF BREATH: 0
BACK PAIN: 1
BLOOD IN STOOL: 0
EYE DISCHARGE: 0
ABDOMINAL DISTENTION: 0
TROUBLE SWALLOWING: 0
WHEEZING: 0
COLOR CHANGE: 0
DIARRHEA: 0
COUGH: 0

## 2020-03-25 NOTE — PROGRESS NOTES
[address] [address]  Dept: [phone]  Dept Fax: [phone]     Shane Jimenez is a 72 y.o. female who presents today for her medical conditions/complaintsas noted below. Shane Jimenez is c/o of   Chief Complaint   Patient presents with    Other     Wants order to be admitted into [address] [place] [address] Arthritis     Rheumatoid -[address] Depression     Major depression-bipolar-HCC Gap    Gait Problem     Trouble standing on Rt leg    Fall     3/24 on Lt hip         HPI:      Recent surg back  Falls  In pain  Unable to cope at home  Needs ecf placement  HTN  Onset more than 2 years ago  bello mild to mod  Controlled with current po meds  Not associated with headaches or blurry vision  No chest pain  Diabetes   Duration more than 7 years  Modifying factors on Glucophage and other med  Severity uncontrolled sever  Associated signs and symtoms neuropathy/ckd/ CAD. aggravated with sugar diet and better with low sugar diet             Hemoglobin A1C (%)   Date Value   03/04/2020 5.7   10/02/2019 5.5   06/11/2019 5.7             ( goal A1Cis < 7)   Microalb/Crt.  Ratio (mcg/mg creat)   Date Value   08/05/2015 11     LDL Cholesterol (mg/dL)   Date Value   05/20/2019 42   12/27/2017 34   07/01/2017 41       (goal LDL is <100)   AST (U/L)   Date Value   01/27/2020 22     ALT (U/L)   Date Value   01/27/2020 17     BUN (mg/dL)   Date Value   02/24/2020 30 (H)     BP Readings from Last 3 Encounters:   03/25/20 (!) 150/72   03/04/20 (!) 178/90   02/24/20 (!) 129/56          (goal 120/80)    Past Medical History:   Diagnosis Date    Allergic rhinitis, cause unspecified     Back pain     lumbar    Bowel obstruction (HCC)     history of due to scar tissue, resolved non-surgically    C. difficile diarrhea     CAD (coronary artery disease)     Cellulitis     left leg    Cellulitis 2017 August    leg left leg/bug bite    Diverticulosis of colon (without mention of hemorrhage)     GERD (gastroesophageal reflux disease)     History of MI (myocardial infarction) 2005    thought due to a blood clot    History of ovarian cyst 1970    had oopherectomy    History of peritonitis 1968    due to ruptured appendix age 13    HTN (hypertension)     Hx of blood clots     right leg    Hyperlipidemia     Intestinal or peritoneal adhesions with obstruction (postoperative) (postinfection) ([facility] [address]     Kidney infection     renal failure/sepsis/spider bite    Lateral epicondylitis  of elbow     Muscle strain     right posterior shoulder    Other abnormal glucose     PONV (postoperative nausea and vomiting)     dry heaves    Pre-diabetes     Restless legs syndrome (RLS)     TIA (transient ischemic attack) 2014    Vitamin D deficiency     Wears glasses       Past Surgical History:   Procedure Laterality Date    ABDOMEN SURGERY  1976    benign tumor removed near remaining ovary, 1.5 pounds    APPENDECTOMY  1968    appendix ruptured, developed peritonitis    BUNIONECTOMY Left     along with calcium deposits removed   [address]  2005    negative    COLECTOMY  1969    12 INCHES REMOVED D/T OBSTRUCTION    COLONOSCOPY      CYST REMOVAL Right     right facial    HYSTERECTOMY  1973    taken as a result of recurring cysts    LUMBAR FUSION N/A 2/10/2020    LUMBAR L4-5 POSTERIOR  DECOMPRESSION INSTRUMENTATION FUSION WCEMENT AUGMENTATION/ performed by Lucia Bro MD at [address] [place] [address]  8/14/14    FESS   [address]    UNILATERAL due to cyst    OVARY REMOVAL  1971    partial, due to cyst   Everlean Makua SINUS SURGERY  2004    UPPER GASTROINTESTINAL ENDOSCOPY N/A 5/31/2019    EGD ESOPHAGOGASTRODUODENOSCOPY performed by Mel Sahni MD at [facility] N/A 8/5/2019    EGD BIOPSY performed by [address], MD at [address] [facility] N/A 8/23/2019 EGD BIOPSY performed by Leonardo Mcfadden MD at [address] [facility] [address] Left 3/5/2019    WRIST OPEN REDUCTION INTERNAL FIXATION performed by Rhae Sacks, MD at [address] History   Problem Relation Age of Onset    Stroke Mother     Diabetes Mother     Heart Disease Mother     High Blood Pressure Mother     Heart Disease Father     Heart Disease Brother     High Blood Pressure Brother     Heart Disease Maternal Grandmother     High Blood Pressure Sister           Social History     Tobacco Use    Smoking status: Former Smoker     Packs/day: 0.50     Years: 20.00     Pack years: 10.00     Types: Cigarettes     Start date: 1995     Last attempt to quit: 2017     Years since quittin.7    Smokeless tobacco: Never Used   Substance Use Topics    Alcohol use: No     Alcohol/week: 0.0 standard drinks         Current Outpatient Medications   Medication Sig Dispense Refill    HYDROcodone-acetaminophen (NORCO) 5-325 MG per tablet Take 1 tablet by mouth every 6 hours as needed for Pain for up to 7 days. Intended supply: 7 days.  Take lowest dose possible to manage pain 28 tablet 0    pramipexole (MIRAPEX) 1 MG tablet Take 1.5 tablets by mouth daily 145 tablet 3    pantoprazole (PROTONIX) 40 MG tablet Take 1 tablet by mouth 2 times daily (before meals) 90 tablet 3    atorvastatin (LIPITOR) 80 MG tablet Take 0.5 tablets by mouth nightly 90 tablet 3    lisinopril (PRINIVIL;ZESTRIL) 20 MG tablet Take 1 tablet by mouth daily 30 tablet 3    polyethylene glycol (GLYCOLAX) packet Take 17 g by mouth daily 527 g 1    carvedilol (COREG) 6.25 MG tablet Take 1 tablet by mouth 2 times daily 60 tablet 0    ferrous sulfate 325 (65 Fe) MG tablet Take 1 tablet by mouth daily (with breakfast) 90 tablet 2    VITAMIN D, ERGOCALCIFEROL, PO Take by mouth      citalopram (CELEXA) 10 MG tablet Take 1 tablet by mouth daily 90 tablet 3    fenofibrate micronized (LOFIBRA) 134 MG capsule Take 1 capsule by mouth every morning (before breakfast) 90 capsule 3    Lancets MISC 1 each by Does not apply route daily 100 each 3    blood glucose monitor strips Test 2 times a day & as needed for symptoms of irregular blood glucose. 100 strip 5    SITagliptin (JANUVIA) 100 MG tablet Take 0.5 tablets by mouth daily 90 tablet 3    lidocaine (XYLOCAINE) 5 % ointment 4-5 times a day to your right thigh for pain. 240 g 3    nitroGLYCERIN (NITROSTAT) 0.4 MG SL tablet Place 1 tablet under the tongue every 5 minutes as needed for Chest pain 75 tablet 3    Calcium Carbonate-Vitamin D (CALCIUM 500/D) 500-125 MG-UNIT TABS Take 1 tablet by mouth 2 times daily        No current facility-administered medications for this visit. Allergies   Allergen Reactions    Mobic [Meloxicam]     Bactrim [Sulfamethoxazole-Trimethoprim] Other (See Comments)     sepsis    Codeine Itching    Seasonal        Health Maintenance   Topic Date Due    Diabetic foot exam  01/18/1961    Diabetic retinal exam  01/18/1961    DTaP/Tdap/Td vaccine (1 - Tdap) 01/18/1970    Annual Wellness Visit (AWV)  05/29/2019    Breast cancer screen  08/04/2019    Flu vaccine (1) 09/01/2019    Shingles Vaccine (1 of 2) 10/10/2020 (Originally 1/18/2001)    Lipid screen  05/20/2020    Potassium monitoring  02/24/2021    Creatinine monitoring  02/24/2021    A1C test (Diabetic or Prediabetic)  03/04/2021    Colon cancer screen colonoscopy  10/07/2026    DEXA (modify frequency per FRAX score)  Completed    Pneumococcal 65+ years Vaccine  Completed    Hepatitis C screen  Completed    Hepatitis A vaccine  Aged Out    Hib vaccine  Aged Out    Meningococcal (ACWY) vaccine  Aged Out       Subjective:     Review of Systems   Constitutional: Negative for appetite change, diaphoresis and fatigue. HENT: Negative for ear discharge and trouble swallowing. Eyes: Negative for pain and discharge.    Respiratory: Negative for cough, shortness of breath and wheezing. Cardiovascular: Negative for chest pain and palpitations. Gastrointestinal: Negative for abdominal distention, blood in stool and diarrhea. Endocrine: Negative for polydipsia and polyphagia. Genitourinary: Negative for difficulty urinating and frequency. Musculoskeletal: Positive for arthralgias and back pain. Negative for gait problem, myalgias and neck pain. Skin: Negative for color change and rash. Allergic/Immunologic: Negative for environmental allergies and food allergies. Neurological: Negative for dizziness and headaches. Hematological: Negative for adenopathy. Does not bruise/bleed easily. Psychiatric/Behavioral: Negative for behavioral problems and sleep disturbance. Objective:     Physical Exam  Constitutional:       Appearance: She is well-developed. She is not diaphoretic. HENT:      Head: Normocephalic and atraumatic. Eyes:      General:         Right eye: No discharge. Left eye: No discharge. Extraocular Movements:      Right eye: Normal extraocular motion. Left eye: Normal extraocular motion. Conjunctiva/sclera: Conjunctivae normal.      Right eye: Right conjunctiva is not injected. Left eye: Left conjunctiva is not injected. Neck:      Musculoskeletal: Normal range of motion and neck supple. No edema or erythema. Thyroid: No thyroid mass or thyromegaly. Vascular: No JVD. Cardiovascular:      Rate and Rhythm: Normal rate and regular rhythm. Heart sounds: No murmur. No friction rub. Pulmonary:      Effort: Pulmonary effort is normal. No tachypnea, bradypnea, accessory muscle usage or respiratory distress. Breath sounds: Normal breath sounds. No wheezing or rales. Abdominal:      General: Bowel sounds are normal. There is no distension. Palpations: Abdomen is soft. Tenderness: There is no abdominal tenderness. There is no rebound. Musculoskeletal: Normal range of motion.          General: No Patient given educational materials - see patient instructions. Discussed use, benefit, and side effects of prescribed medications. All patientquestions answered. Pt voiced understanding. Reviewed health maintenance. Instructedto continue current medications, diet and exercise. Patient agreed with treatmentplan. Follow up as directed.      Electronically signed by Izzy Varma MD on 3/25/2020 at 10:35 AM

## 2020-03-25 NOTE — PROGRESS NOTES
Visit Information    Have you changed or started any medications since your last visit including any over-the-counter medicines, vitamins, or herbal medicines? no   Are you having any side effects from any of your medications? -  no  Have you stopped taking any of your medications? Is so, why? -  no    Have you seen any other physician or provider since your last visit? Yes - Records Obtained  Have you had any other diagnostic tests since your last visit? Yes - Records Obtained  Have you been seen in the emergency room and/or had an admission to a hospital since we last saw you? No  Have you had your routine dental cleaning in the past 6 months? no    Have you activated your [employer] account? If not, what are your barriers?  Yes     Patient Care Team:  Amanda Reyez MD as PCP - General (Internal Medicine)  Amanda Reyez MD as PCP - [facility] Provider  Val Hardy MD as Consulting Physician (Gastroenterology)    Medical History Review  Past Medical, Family, and Social History reviewed and does contribute to the patient presenting condition    Health Maintenance   Topic Date Due    Diabetic foot exam  01/18/1961    Diabetic retinal exam  01/18/1961    DTaP/Tdap/Td vaccine (1 - Tdap) 01/18/1970    Annual Wellness Visit (AWV)  05/29/2019    Breast cancer screen  08/04/2019    Flu vaccine (1) 09/01/2019    Shingles Vaccine (1 of 2) 10/10/2020 (Originally 1/18/2001)    Lipid screen  05/20/2020    Potassium monitoring  02/24/2021    Creatinine monitoring  02/24/2021    A1C test (Diabetic or Prediabetic)  03/04/2021    Colon cancer screen colonoscopy  10/07/2026    DEXA (modify frequency per FRAX score)  Completed    Pneumococcal 65+ years Vaccine  Completed    Hepatitis C screen  Completed    Hepatitis A vaccine  Aged Out    Hib vaccine  Aged Out    Meningococcal (ACWY) vaccine  Aged Out

## 2020-03-31 ENCOUNTER — TELEPHONE (OUTPATIENT)
Dept: ORTHOPEDIC SURGERY | Age: 70
End: 2020-03-31

## 2020-03-31 LAB
BUN BLDV-MCNC: 51 MG/DL
CALCIUM SERPL-MCNC: 9.6 MG/DL
CHLORIDE BLD-SCNC: 101 MMOL/L
CO2: 28 MMOL/L
CREAT SERPL-MCNC: 1.75 MG/DL
GFR CALCULATED: 29
GLUCOSE BLD-MCNC: 105 MG/DL
POTASSIUM SERPL-SCNC: 3.9 MMOL/L
SODIUM BLD-SCNC: 139 MMOL/L

## 2020-04-01 ENCOUNTER — TELEPHONE (OUTPATIENT)
Dept: GASTROENTEROLOGY | Age: 70
End: 2020-04-01

## 2020-04-01 NOTE — TELEPHONE ENCOUNTER
Lvm for pt that appt on 04/09 has been canceled due to covid and to callback to reschedule or set up a VV/MyChart visit

## 2020-04-02 ENCOUNTER — TELEPHONE (OUTPATIENT)
Dept: INTERVENTIONAL RADIOLOGY/VASCULAR | Age: 70
End: 2020-04-02

## 2020-04-08 ENCOUNTER — HOSPITAL ENCOUNTER (OUTPATIENT)
Dept: INTERVENTIONAL RADIOLOGY/VASCULAR | Age: 70
Discharge: HOME | End: 2020-04-10
Payer: MEDICARE

## 2020-04-08 ENCOUNTER — HOSPITAL ENCOUNTER (OUTPATIENT)
Dept: CT IMAGING | Age: 70
Discharge: HOME | End: 2020-04-10
Payer: MEDICARE

## 2020-04-08 VITALS
TEMPERATURE: 97.7 F | OXYGEN SATURATION: 96 % | HEART RATE: 66 BPM | SYSTOLIC BLOOD PRESSURE: 128 MMHG | RESPIRATION RATE: 16 BRPM | DIASTOLIC BLOOD PRESSURE: 77 MMHG

## 2020-04-08 LAB
INR BLD: 1
PARTIAL THROMBOPLASTIN TIME: 30.2 SEC (ref 24–36)
PLATELET # BLD: 252 K/UL (ref 150–450)
PROTHROMBIN TIME: 13.1 SEC (ref 11.8–14.6)

## 2020-04-08 PROCEDURE — 6360000004 HC RX CONTRAST MEDICATION: Performed by: ORTHOPAEDIC SURGERY

## 2020-04-08 PROCEDURE — 7100000011 HC PHASE II RECOVERY - ADDTL 15 MIN

## 2020-04-08 PROCEDURE — 62304 MYELOGRAPHY LUMBAR INJECTION: CPT | Performed by: RADIOLOGY

## 2020-04-08 PROCEDURE — 85610 PROTHROMBIN TIME: CPT

## 2020-04-08 PROCEDURE — 7100000031 HC ASPR PHASE II RECOVERY - ADDTL 15 MIN

## 2020-04-08 PROCEDURE — 2709999900 IR MYELOGRAM LUMBOSACRAL

## 2020-04-08 PROCEDURE — 72132 CT LUMBAR SPINE W/DYE: CPT

## 2020-04-08 PROCEDURE — 7100000010 HC PHASE II RECOVERY - FIRST 15 MIN

## 2020-04-08 PROCEDURE — 36415 COLL VENOUS BLD VENIPUNCTURE: CPT

## 2020-04-08 PROCEDURE — 85730 THROMBOPLASTIN TIME PARTIAL: CPT

## 2020-04-08 PROCEDURE — 85049 AUTOMATED PLATELET COUNT: CPT

## 2020-04-08 PROCEDURE — 7100000030 HC ASPR PHASE II RECOVERY - FIRST 15 MIN

## 2020-04-08 RX ORDER — ACETAMINOPHEN 325 MG/1
650 TABLET ORAL EVERY 4 HOURS PRN
Status: DISCONTINUED | OUTPATIENT
Start: 2020-04-08 | End: 2020-04-11 | Stop reason: HOSPADM

## 2020-04-08 RX ADMIN — IOPAMIDOL 20 ML: 408 INJECTION, SOLUTION INTRATHECAL at 14:20

## 2020-04-08 NOTE — PROGRESS NOTES
Dr Odessa Fuentes arrives, 1% lidocaine injected and needle inserted and 15 ml contrast injected as ordered. Fluoro images taken and needle removed. Site cleansed off and band aid appplied. Pt taken off IR table and onto stretcher and taken to CT for images.

## 2020-04-08 NOTE — OP NOTE
Brief Postoperative Note    Hernan Hooks  YOB: 1950  [id number]    Pre-operative Diagnosis: back pain, LE pain    Post-operative Diagnosis: Same    Procedure: lumbar myelogram    Anesthesia: Local   Surgeons/Assistants: Valentin Killian MD     Estimated Blood Loss: minimal    Complications: none immediate    Specimens: were not obtained      Electronically signed by Valentin Killian MD on 4/8/2020 at 2:18 PM

## 2020-04-08 NOTE — PROGRESS NOTES
Pt to IR per cart, pt speaks with Dr Deiv Nunez and updated on procedure and consent form signed. Pt taken to IR fluoro table and placed prone. Pt tearful and in pain. Pt comforted and lower back prepped.

## 2020-04-22 ENCOUNTER — CARE COORDINATION (OUTPATIENT)
Dept: CASE MANAGEMENT | Age: 70
End: 2020-04-22

## 2020-04-22 PROCEDURE — 1111F DSCHRG MED/CURRENT MED MERGE: CPT | Performed by: INTERNAL MEDICINE

## 2020-04-22 NOTE — CARE COORDINATION
[facility] Transitions Initial Follow Up Call    Call within 2 business days of discharge: Yes    Patient: Vi Payne Patient : 1950   MRN: <[id number]>  Reason for Admission: S/P Lumbar spinal fusion  Discharge Date: 20 RARS: Readmission Risk Score: 26      Last Discharge [address] [address]       Complaint Diagnosis Description Type Department Provider    20  S/P lumbar spinal fusion . .. Admission (Discharged) Leticia Dobbins MD      Patient returned call as requested. Patient s/p spinal fusion/left leg affected in Feb.at [facility]. After surgery two weeks in rehab. Patient d/c to home for approximately one month fell and injured rt leg.  called [facility] for admission to rehab for therapy. Per Patientping patient discharged to home on 20. Patient denies respiratory or GI concerns. Pain 7/10. After Percocet 5/10. Medications reviewed. Not in Epic is over the counter Lidocaine patches and Flexeril as needed. Appetite good. Mood affected. Patient on anti-depressant. Writer encouraged patient to discuss increase with PCP. [address] expected to visit today. Patient ambulates using a front wheeled walker. Other DME equipment include a hosp.bed, shower chair, high rise toilet seat and grab bars. Patient has good support. No other concerns.  LEONIDAS FloydN RN  Care Transition Nurse [phone]         Spoke with: [address]: [phone] services provided:  Obtained and reviewed discharge summary and/or continuity of care documents    Care Transitions 24 Hour Call    Do you have all of your prescriptions and are they filled?:  Yes (Comment: waiting for a couple from her mail order pharmacy)  [address] (Comment: [facility])  Patient DME:  Sheila Mcknight, Other  Other Patient DME:  grab bars  Do you have support at home?:  Partner/Spouse/SO  Are you an active caregiver in your home?: No  Care Transitions Interventions     Other Services:   (Comment: set up vns/OL)          Follow Up  Future Appointments   Date Time Provider Britni Joanna   4/23/2020 10:40 AM [facility]   5/8/2020  3:30 PM JM Marquez   7/2/2020  4:00 PM Abena Schmitz MD [facility] Georgiana Roque RN

## 2020-04-23 ENCOUNTER — OFFICE VISIT (OUTPATIENT)
Dept: ORTHOPEDIC SURGERY | Age: 70
End: 2020-04-23

## 2020-04-23 VITALS — HEIGHT: 63 IN | BODY MASS INDEX: 26.58 KG/M2 | WEIGHT: 150 LBS

## 2020-04-23 PROCEDURE — 99024 POSTOP FOLLOW-UP VISIT: CPT | Performed by: ORTHOPAEDIC SURGERY

## 2020-04-23 NOTE — PROGRESS NOTES
Patient ID: Julio Fajardo is a 72 y.o. female. Chief Complaint   Patient presents with    Post-Op Check     Patient states she had back surgery in February and and some testing done. She sates she is here for results. HPI       Please refer to my previous clinic notes    Patient continues to have rather severe radicular leg pain.     It is been 2 weeks since patient had her CT myelogram    Past Medical History:   Diagnosis Date    Allergic rhinitis, cause unspecified     Back pain     lumbar    Bowel obstruction (HCC)     history of due to scar tissue, resolved non-surgically    C. difficile diarrhea     CAD (coronary artery disease)     Cellulitis     left leg    Cellulitis 2017 August    leg left leg/bug bite    Diverticulosis of colon (without mention of hemorrhage)     GERD (gastroesophageal reflux disease)     History of MI (myocardial infarction) 2005    thought due to a blood clot    History of ovarian cyst 1970    had oopherectomy    History of peritonitis 1968    due to ruptured appendix age 13    HTN (hypertension)     Hx of blood clots     right leg    Hyperlipidemia     Intestinal or peritoneal adhesions with obstruction (postoperative) (postinfection) ([facility] [address]     Kidney infection     renal failure/sepsis/spider bite    Lateral epicondylitis  of elbow     Muscle strain     right posterior shoulder    Other abnormal glucose     PONV (postoperative nausea and vomiting)     dry heaves    Pre-diabetes     Restless legs syndrome (RLS)     TIA (transient ischemic attack) 2014    Vitamin D deficiency     Wears glasses      Past Surgical History:   Procedure Laterality Date    ABDOMEN SURGERY  1976    benign tumor removed near remaining ovary, 1.5 pounds    APPENDECTOMY  1968    appendix ruptured, developed peritonitis    BUNIONECTOMY Left     along with calcium deposits removed   [address]  2005    negative    COLECTOMY [facility] D/T OBSTRUCTION    COLONOSCOPY      CYST REMOVAL Right     right facial    HYSTERECTOMY  1973    taken as a result of recurring cysts    LUMBAR FUSION N/A 2/10/2020    LUMBAR L4-5 POSTERIOR  DECOMPRESSION INSTRUMENTATION FUSION WCEMENT AUGMENTATION/ performed by Feroz Valdes MD at [address]  14    FESS   [address] due to cyst    OVARY REMOVAL      partial, due to cyst   [facility] SINUS SURGERY      UPPER GASTROINTESTINAL ENDOSCOPY N/A 2019    EGD ESOPHAGOGASTRODUODENOSCOPY performed by Ayesha Elaine MD at [address] N/A 2019    EGD BIOPSY performed by Alfredito Caceres MD at [address] N/A 2019    EGD BIOPSY performed by Ayesha Elaine MD at [address] [place] [address] Left 3/5/2019    WRIST OPEN REDUCTION INTERNAL FIXATION performed by Carlton Rubio MD at [facility] History   Problem Relation Age of Onset    Stroke Mother     Diabetes Mother     Heart Disease Mother     High Blood Pressure Mother     Heart Disease Father     Heart Disease Brother     High Blood Pressure Brother     Heart Disease Maternal Grandmother     High Blood Pressure Sister      Social History     Occupational History    Occupation: retired   Tobacco Use    Smoking status: Former Smoker     Packs/day: 0.50     Years: 20.00     Pack years: 10.00     Types: Cigarettes     Start date: 1995     Last attempt to quit: 2017     Years since quittin.8    Smokeless tobacco: Never Used   Substance and Sexual Activity    Alcohol use: No     Alcohol/week: 0.0 standard drinks    Drug use: No    Sexual activity: Not on file        Review of Systems         Physical Exam  Vitals signs and nursing note reviewed. Constitutional:       Appearance: She is well-developed. HENT:      Head: Normocephalic and atraumatic.       Nose: Nose

## 2020-04-25 ENCOUNTER — HOSPITAL ENCOUNTER (OUTPATIENT)
Dept: PREADMISSION TESTING | Age: 70
Discharge: HOME | End: 2020-04-29
Payer: MEDICARE

## 2020-04-25 LAB
SARS-COV-2, PCR: ABNORMAL
SARS-COV-2, RAPID: ABNORMAL
SARS-COV-2: DETECTED
SOURCE: ABNORMAL

## 2020-04-25 PROCEDURE — U0002 COVID-19 LAB TEST NON-CDC: HCPCS

## 2020-04-30 ENCOUNTER — VIRTUAL VISIT (OUTPATIENT)
Dept: ORTHOPEDIC SURGERY | Age: 70
End: 2020-04-30

## 2020-04-30 PROCEDURE — 99024 POSTOP FOLLOW-UP VISIT: CPT | Performed by: ORTHOPAEDIC SURGERY

## 2020-04-30 NOTE — PROGRESS NOTES
Patient ID: Ying Buchanan is a 72 y.o. female.     Chief Complaint   Patient presents with    Follow-up     lumbar        HPI      Refer to all of my previous clinic notes    Patient with low back right radicular leg pain post L4-5 PLIF patient developed a Chance fracture through L5 as confirmed on CT myelogram    Follow-up x-ray showed that this fracture to be not progressing however patient remains symptomatic we therefore scheduled her for a L4 to pelvis instrumented fusion but this was canceled due to the patient testing positive for current COVID-19    Phone visit today patient reports radicular leg pain and low back pain significantly improved    I was in the office and patient was  at her current residence correspondence was roughly 20 minutes    Past Medical History:   Diagnosis Date    Allergic rhinitis, cause unspecified     Back pain     lumbar    Bowel obstruction ([address]     history of due to scar tissue, resolved non-surgically    C. difficile diarrhea     CAD (coronary artery disease)     Cellulitis     left leg    Cellulitis 2017 August    leg left leg/bug bite    Diverticulosis of colon (without mention of hemorrhage)     GERD (gastroesophageal reflux disease)     History of MI (myocardial infarction) 2005    thought due to a blood clot    History of ovarian cyst 1970    had oopherectomy    History of peritonitis 1968    due to ruptured appendix age 13    HTN (hypertension)     Hx of blood clots     right leg    Hyperlipidemia     Intestinal or peritoneal adhesions with obstruction (postoperative) (postinfection) ([address]     Kidney infection     renal failure/sepsis/spider bite    Lateral epicondylitis  of elbow     Muscle strain     right posterior shoulder    Other abnormal glucose     PONV (postoperative nausea and vomiting)     dry heaves    Pre-diabetes     Restless legs syndrome (RLS)     TIA (transient ischemic attack) 2014    Vitamin D deficiency     Wears glasses      Past Surgical History:   Procedure Laterality Date    ABDOMEN SURGERY      benign tumor removed near remaining ovary, 1.5 pounds    APPENDECTOMY  1968    appendix ruptured, developed peritonitis    BUNIONECTOMY Left     along with calcium deposits removed   [address]      negative    COLECTOMY  1969    12 INCHES REMOVED D/T OBSTRUCTION    COLONOSCOPY      CYST REMOVAL Right     right facial    HYSTERECTOMY  1973    taken as a result of recurring cysts    LUMBAR FUSION N/A 2/10/2020    LUMBAR L4-5 POSTERIOR  DECOMPRESSION INSTRUMENTATION FUSION WCEMENT AUGMENTATION/ performed by Kisha Gallardo MD at [place] [address].  14    FESS   [address] due to cyst    OVARY REMOVAL      partial, due to cyst   [facility] SINUS SURGERY      UPPER GASTROINTESTINAL ENDOSCOPY N/A 2019    EGD ESOPHAGOGASTRODUODENOSCOPY performed by Karolyn Gonzales MD at [address] [place] [address] N/A 2019    EGD BIOPSY performed by Asia Motley MD at [address] [place] [address] N/A 2019    EGD BIOPSY performed by Karolyn Gonzales MD at [address] [place] [address] 3/5/2019    WRIST OPEN REDUCTION INTERNAL FIXATION performed by Glynn Martin MD at [facility] History   Problem Relation Age of Onset    Stroke Mother     Diabetes Mother     Heart Disease Mother     High Blood Pressure Mother     Heart Disease Father     Heart Disease Brother     High Blood Pressure Brother     Heart Disease Maternal Grandmother     High Blood Pressure Sister      Social History     Occupational History    Occupation: retired   Tobacco Use    Smoking status: Former Smoker     Packs/day: 0.50     Years: 20.00     Pack years: 10.00     Types: Cigarettes     Start date: 1995     Last attempt to quit: 2017     Years since quittin.8    Smokeless

## 2020-05-01 ENCOUNTER — TELEPHONE (OUTPATIENT)
Dept: INTERNAL MEDICINE CLINIC | Age: 70
End: 2020-05-01

## 2020-05-04 ENCOUNTER — TELEPHONE (OUTPATIENT)
Dept: INTERNAL MEDICINE CLINIC | Age: 70
End: 2020-05-04

## 2020-05-04 RX ORDER — CIPROFLOXACIN 250 MG/1
250 TABLET, FILM COATED ORAL 2 TIMES DAILY
Qty: 14 TABLET | Refills: 0 | Status: SHIPPED | OUTPATIENT
Start: 2020-05-04 | End: 2020-05-11

## 2020-05-08 RX ORDER — FENOFIBRATE 134 MG/1
134 CAPSULE ORAL
Qty: 90 CAPSULE | Refills: 3 | Status: SHIPPED | OUTPATIENT
Start: 2020-05-08 | End: 2020-08-17 | Stop reason: SDUPTHER

## 2020-05-14 ENCOUNTER — OFFICE VISIT (OUTPATIENT)
Dept: ORTHOPEDIC SURGERY | Age: 70
End: 2020-05-14

## 2020-05-14 VITALS — HEIGHT: 63 IN | BODY MASS INDEX: 26.56 KG/M2 | WEIGHT: 149.91 LBS

## 2020-05-14 PROCEDURE — 99024 POSTOP FOLLOW-UP VISIT: CPT | Performed by: ORTHOPAEDIC SURGERY

## 2020-05-14 NOTE — PROGRESS NOTES
with calcium deposits removed   [address]      negative    COLECTOMY  1969    12 INCHES REMOVED D/T OBSTRUCTION    COLONOSCOPY      CYST REMOVAL Right     right facial    HYSTERECTOMY  1973    taken as a result of recurring cysts    LUMBAR FUSION N/A 2/10/2020    LUMBAR L4-5 POSTERIOR  DECOMPRESSION INSTRUMENTATION FUSION WCEMENT AUGMENTATION/ performed by Olena Bamberger, MD at [address] [place] [address]  14    FESS   [address] due to cyst    OVARY REMOVAL      partial, due to cyst   [facility] SINUS SURGERY      UPPER GASTROINTESTINAL ENDOSCOPY N/A 2019    EGD ESOPHAGOGASTRODUODENOSCOPY performed by Karla Carranza MD at [address] [place] [address] N/A 2019    EGD BIOPSY performed by Lolita Navarro MD at [address] [place] [address] N/A 2019    EGD BIOPSY performed by Karla Carranza MD at [address] [place] [address] 3/5/2019    WRIST OPEN REDUCTION INTERNAL FIXATION performed by Alicia Amador MD at [facility] History   Problem Relation Age of Onset    Stroke Mother     Diabetes Mother     Heart Disease Mother     High Blood Pressure Mother     Heart Disease Father     Heart Disease Brother     High Blood Pressure Brother     Heart Disease Maternal Grandmother     High Blood Pressure Sister      Social History     Occupational History    Occupation: retired   Tobacco Use    Smoking status: Former Smoker     Packs/day: 0.50     Years: 20.00     Pack years: 10.00     Types: Cigarettes     Start date: 1995     Last attempt to quit: 2017     Years since quittin.9    Smokeless tobacco: Never Used   Substance and Sexual Activity    Alcohol use: No     Alcohol/week: 0.0 standard drinks    Drug use: No    Sexual activity: Not on file        Review of Systems         Physical Exam  Vitals signs and nursing note reviewed.

## 2020-05-15 RX ORDER — HYDROCODONE BITARTRATE AND ACETAMINOPHEN 5; 325 MG/1; MG/1
1 TABLET ORAL EVERY 6 HOURS PRN
Qty: 28 TABLET | Refills: 0 | Status: SHIPPED | OUTPATIENT
Start: 2020-05-15 | End: 2020-05-22 | Stop reason: SDUPTHER

## 2020-05-22 RX ORDER — HYDROCODONE BITARTRATE AND ACETAMINOPHEN 5; 325 MG/1; MG/1
1 TABLET ORAL EVERY 6 HOURS PRN
Qty: 18 TABLET | Refills: 0 | Status: SHIPPED | OUTPATIENT
Start: 2020-05-22 | End: 2020-05-28 | Stop reason: SDUPTHER

## 2020-05-27 ENCOUNTER — TELEPHONE (OUTPATIENT)
Dept: INTERNAL MEDICINE CLINIC | Age: 70
End: 2020-05-27

## 2020-05-28 RX ORDER — HYDROCODONE BITARTRATE AND ACETAMINOPHEN 5; 325 MG/1; MG/1
1 TABLET ORAL EVERY 6 HOURS PRN
Qty: 18 TABLET | Refills: 0 | Status: SHIPPED | OUTPATIENT
Start: 2020-05-28 | End: 2020-06-02 | Stop reason: SDUPTHER

## 2020-06-02 RX ORDER — HYDROCODONE BITARTRATE AND ACETAMINOPHEN 5; 325 MG/1; MG/1
1 TABLET ORAL EVERY 6 HOURS PRN
Qty: 18 TABLET | Refills: 0 | Status: SHIPPED | OUTPATIENT
Start: 2020-06-02 | End: 2020-06-09

## 2020-06-03 ENCOUNTER — HOSPITAL ENCOUNTER (OUTPATIENT)
Dept: CT IMAGING | Age: 70
Discharge: HOME | End: 2020-06-05
Payer: MEDICARE

## 2020-06-03 ENCOUNTER — HOSPITAL ENCOUNTER (OUTPATIENT)
Age: 70
Discharge: HOME | End: 2020-06-03
Attending: ORTHOPAEDIC SURGERY
Payer: MEDICARE

## 2020-06-03 LAB
ABSOLUTE EOS #: 0.5 K/UL (ref 0–0.4)
ABSOLUTE IMMATURE GRANULOCYTE: ABNORMAL K/UL (ref 0–0.3)
ABSOLUTE LYMPH #: 1.5 K/UL (ref 1–4.8)
ABSOLUTE MONO #: 0.5 K/UL (ref 0.1–1.3)
BASOPHILS # BLD: 1 % (ref 0–2)
BASOPHILS ABSOLUTE: 0.1 K/UL (ref 0–0.2)
BUN BLDV-MCNC: 35 MG/DL (ref 8–23)
CREAT SERPL-MCNC: 1.1 MG/DL (ref 0.5–0.9)
DIFFERENTIAL TYPE: ABNORMAL
EOSINOPHILS RELATIVE PERCENT: 7 % (ref 0–4)
GFR AFRICAN AMERICAN: 60 ML/MIN
GFR NON-AFRICAN AMERICAN: 49 ML/MIN
GFR SERPL CREATININE-BSD FRML MDRD: ABNORMAL ML/MIN/{1.73_M2}
GFR SERPL CREATININE-BSD FRML MDRD: ABNORMAL ML/MIN/{1.73_M2}
HCT VFR BLD CALC: 33.4 % (ref 36–46)
HEMOGLOBIN: 11.1 G/DL (ref 12–16)
IMMATURE GRANULOCYTES: ABNORMAL %
LYMPHOCYTES # BLD: 22 % (ref 24–44)
MCH RBC QN AUTO: 31.2 PG (ref 26–34)
MCHC RBC AUTO-ENTMCNC: 33.3 G/DL (ref 31–37)
MCV RBC AUTO: 93.6 FL (ref 80–100)
MONOCYTES # BLD: 8 % (ref 1–7)
NRBC AUTOMATED: ABNORMAL PER 100 WBC
PDW BLD-RTO: 17.7 % (ref 11.5–14.9)
PLATELET # BLD: 404 K/UL (ref 150–450)
PLATELET ESTIMATE: ABNORMAL
PMV BLD AUTO: 6.8 FL (ref 6–12)
RBC # BLD: 3.57 M/UL (ref 4–5.2)
RBC # BLD: ABNORMAL 10*6/UL
SEG NEUTROPHILS: 62 % (ref 36–66)
SEGMENTED NEUTROPHILS ABSOLUTE COUNT: 4.3 K/UL (ref 1.3–9.1)
WBC # BLD: 6.9 K/UL (ref 3.5–11)
WBC # BLD: ABNORMAL 10*3/UL

## 2020-06-03 PROCEDURE — 2580000003 HC RX 258: Performed by: ORTHOPAEDIC SURGERY

## 2020-06-03 PROCEDURE — 85025 COMPLETE CBC W/AUTO DIFF WBC: CPT

## 2020-06-03 PROCEDURE — 84520 ASSAY OF UREA NITROGEN: CPT

## 2020-06-03 PROCEDURE — 72132 CT LUMBAR SPINE W/DYE: CPT

## 2020-06-03 PROCEDURE — 36415 COLL VENOUS BLD VENIPUNCTURE: CPT

## 2020-06-03 PROCEDURE — 6360000004 HC RX CONTRAST MEDICATION: Performed by: ORTHOPAEDIC SURGERY

## 2020-06-03 PROCEDURE — 82565 ASSAY OF CREATININE: CPT

## 2020-06-03 RX ORDER — 0.9 % SODIUM CHLORIDE 0.9 %
80 INTRAVENOUS SOLUTION INTRAVENOUS ONCE
Status: COMPLETED | OUTPATIENT
Start: 2020-06-03 | End: 2020-06-03

## 2020-06-03 RX ORDER — SODIUM CHLORIDE 0.9 % (FLUSH) 0.9 %
10 SYRINGE (ML) INJECTION PRN
Status: DISCONTINUED | OUTPATIENT
Start: 2020-06-03 | End: 2020-06-06 | Stop reason: HOSPADM

## 2020-06-03 RX ADMIN — IOPAMIDOL 75 ML: 755 INJECTION, SOLUTION INTRAVENOUS at 09:59

## 2020-06-03 RX ADMIN — SODIUM CHLORIDE 80 ML: 9 INJECTION, SOLUTION INTRAVENOUS at 09:59

## 2020-06-03 RX ADMIN — Medication 10 ML: at 09:59

## 2020-06-04 ENCOUNTER — OFFICE VISIT (OUTPATIENT)
Dept: ORTHOPEDIC SURGERY | Age: 70
End: 2020-06-04
Payer: MEDICARE

## 2020-06-04 PROCEDURE — G8417 CALC BMI ABV UP PARAM F/U: HCPCS | Performed by: ORTHOPAEDIC SURGERY

## 2020-06-04 PROCEDURE — 99213 OFFICE O/P EST LOW 20 MIN: CPT | Performed by: ORTHOPAEDIC SURGERY

## 2020-06-04 PROCEDURE — 1090F PRES/ABSN URINE INCON ASSESS: CPT | Performed by: ORTHOPAEDIC SURGERY

## 2020-06-04 PROCEDURE — 4040F PNEUMOC VAC/ADMIN/RCVD: CPT | Performed by: ORTHOPAEDIC SURGERY

## 2020-06-04 PROCEDURE — G8399 PT W/DXA RESULTS DOCUMENT: HCPCS | Performed by: ORTHOPAEDIC SURGERY

## 2020-06-04 PROCEDURE — 1123F ACP DISCUSS/DSCN MKR DOCD: CPT | Performed by: ORTHOPAEDIC SURGERY

## 2020-06-04 PROCEDURE — G8427 DOCREV CUR MEDS BY ELIG CLIN: HCPCS | Performed by: ORTHOPAEDIC SURGERY

## 2020-06-04 PROCEDURE — 1036F TOBACCO NON-USER: CPT | Performed by: ORTHOPAEDIC SURGERY

## 2020-06-04 PROCEDURE — 3017F COLORECTAL CA SCREEN DOC REV: CPT | Performed by: ORTHOPAEDIC SURGERY

## 2020-06-04 NOTE — PROGRESS NOTES
and atraumatic. Nose: Nose normal.   Eyes:      Conjunctiva/sclera: Conjunctivae normal.   Neck:      Musculoskeletal: Normal range of motion and neck supple. Pulmonary:      Effort: Pulmonary effort is normal. No respiratory distress. Musculoskeletal:      Comments: Normal gait     Skin:     General: Skin is warm and dry. Neurological:      Mental Status: She is alert and oriented to person, place, and time. Sensory: No sensory deficit. Psychiatric:         Behavior: Behavior normal.         Thought Content: Thought content normal.       CT scan compared with prior CT scan patient overall picture is that of stability and healing of L5 butt now in abnormal position  Assessment:          1. Acquired spondylolisthesis    2. Lumbar facet arthropathy    3. Spondylosis of lumbar region without myelopathy or radiculopathy    4. Lumbar degenerative disc disease    5. Facet arthritis of lumbar region    6. Spinal stenosis, lumbar region, without neurogenic claudication    7. Osteopenia of lumbar spine    8. Chronic left-sided low back pain with left-sided sciatica    9. Neurogenic claudication due to lumbar spinal stenosis    10. Closed compression fracture of L5 lumbar vertebra with routine healing, subsequent encounter        Plan:     Okay to schedule revision L4 to sacrum fusion decompression with instrumentation and pelvis infuse    No orders of the defined types were placed in this encounter. Feroz Valdes MD    Please note that this chart was generated using voicerecognition Dragon dictation software. Although every effort was made to ensurethe accuracy of this automated transcription, some errors in transcription may haveoccurred.

## 2020-06-05 ENCOUNTER — HOSPITAL ENCOUNTER (OUTPATIENT)
Dept: PREADMISSION TESTING | Age: 70
Setting detail: SPECIMEN
Discharge: HOME | End: 2020-06-09
Payer: MEDICARE

## 2020-06-05 PROCEDURE — U0004 COV-19 TEST NON-CDC HGH THRU: HCPCS

## 2020-06-05 RX ORDER — CARVEDILOL 6.25 MG/1
6.25 TABLET ORAL 2 TIMES DAILY
Qty: 180 TABLET | Refills: 3 | Status: SHIPPED | OUTPATIENT
Start: 2020-06-05 | End: 2020-07-30 | Stop reason: SDUPTHER

## 2020-06-05 RX ORDER — PANTOPRAZOLE SODIUM 40 MG/1
40 TABLET, DELAYED RELEASE ORAL
Qty: 180 TABLET | Refills: 3 | Status: SHIPPED | OUTPATIENT
Start: 2020-06-05 | End: 2020-09-14 | Stop reason: SDUPTHER

## 2020-06-05 NOTE — TELEPHONE ENCOUNTER
Medication: Coreg, Protonix  Last visit: 3/25/20  Next visit: Visit date not found  Last refill: Coreg 2/24/20, Protonix 3/16/20  Pharmacy: [facility]

## 2020-06-06 LAB
SARS-COV-2, PCR: NOT DETECTED
SARS-COV-2, RAPID: NORMAL
SARS-COV-2: NORMAL
SOURCE: NORMAL

## 2020-06-08 ENCOUNTER — TELEPHONE (OUTPATIENT)
Dept: ORTHOPEDIC SURGERY | Age: 70
End: 2020-06-08

## 2020-06-08 NOTE — TELEPHONE ENCOUNTER
Patient is calling for pain medication. She states the medication she has been using is not helping. She uses [facility] [address] for her pharmacy. Please contact patient.

## 2020-06-11 ENCOUNTER — HOSPITAL ENCOUNTER (OUTPATIENT)
Dept: PREADMISSION TESTING | Age: 70
Discharge: HOME | End: 2020-06-15
Payer: MEDICARE

## 2020-06-11 VITALS
DIASTOLIC BLOOD PRESSURE: 57 MMHG | BODY MASS INDEX: 26.87 KG/M2 | HEART RATE: 83 BPM | HEIGHT: 62 IN | TEMPERATURE: 97.9 F | OXYGEN SATURATION: 98 % | WEIGHT: 146 LBS | RESPIRATION RATE: 20 BRPM | SYSTOLIC BLOOD PRESSURE: 143 MMHG

## 2020-06-11 LAB
-: NORMAL
ANION GAP SERPL CALCULATED.3IONS-SCNC: 14 MMOL/L (ref 9–17)
BUN BLDV-MCNC: 46 MG/DL (ref 8–23)
BUN/CREAT BLD: ABNORMAL (ref 9–20)
CALCIUM SERPL-MCNC: 9.2 MG/DL (ref 8.6–10.4)
CHLORIDE BLD-SCNC: 104 MMOL/L (ref 98–107)
CO2: 24 MMOL/L (ref 20–31)
CREAT SERPL-MCNC: 1.09 MG/DL (ref 0.5–0.9)
GFR AFRICAN AMERICAN: >60 ML/MIN
GFR NON-AFRICAN AMERICAN: 50 ML/MIN
GFR SERPL CREATININE-BSD FRML MDRD: ABNORMAL ML/MIN/{1.73_M2}
GFR SERPL CREATININE-BSD FRML MDRD: ABNORMAL ML/MIN/{1.73_M2}
GLUCOSE BLD-MCNC: 152 MG/DL (ref 70–99)
POTASSIUM SERPL-SCNC: 3.6 MMOL/L (ref 3.7–5.3)
REASON FOR REJECTION: NORMAL
SODIUM BLD-SCNC: 142 MMOL/L (ref 135–144)
ZZ NTE CLEAN UP: ORDERED TEST: NORMAL
ZZ NTE WITH NAME CLEAN UP: SPECIMEN SOURCE: NORMAL

## 2020-06-11 PROCEDURE — 80048 BASIC METABOLIC PNL TOTAL CA: CPT

## 2020-06-11 PROCEDURE — 36415 COLL VENOUS BLD VENIPUNCTURE: CPT

## 2020-06-11 RX ORDER — HYDROCODONE BITARTRATE AND ACETAMINOPHEN 5; 325 MG/1; MG/1
2 TABLET ORAL EVERY 6 HOURS PRN
Status: ON HOLD | COMMUNITY
End: 2020-06-19 | Stop reason: HOSPADM

## 2020-06-11 RX ORDER — ONDANSETRON 4 MG/1
4 TABLET, FILM COATED ORAL DAILY PRN
COMMUNITY
End: 2021-05-07

## 2020-06-11 RX ORDER — FUROSEMIDE 40 MG/1
40 TABLET ORAL 2 TIMES DAILY
COMMUNITY
End: 2020-09-01 | Stop reason: SDUPTHER

## 2020-06-11 RX ORDER — HYDRALAZINE HYDROCHLORIDE 50 MG/1
50 TABLET, FILM COATED ORAL DAILY PRN
COMMUNITY
End: 2020-07-30 | Stop reason: ALTCHOICE

## 2020-06-11 ASSESSMENT — PAIN SCALES - GENERAL: PAINLEVEL_OUTOF10: 5

## 2020-06-11 ASSESSMENT — PAIN DESCRIPTION - PAIN TYPE: TYPE: CHRONIC PAIN

## 2020-06-11 ASSESSMENT — PAIN DESCRIPTION - LOCATION: LOCATION: BACK

## 2020-06-11 NOTE — H&P
HISTORY and Jake [address]       NAME:  Solis Kinney  MRN: [id number]   YOB: 1950   Date: 6/11/2020   Age: 72 y.o. Gender: female     COMPLAINT AND PRESENT HISTORY:     Solis Kinney is a 72 y.o.  female, undergoing preadmission testing for posterior decompression fusion with cement augmentation. Patient is status post L4-5 posterior fusion and vertebroplasty 2/10/20. She reportedly had a fall a few weeks after the surgery, complaining of low back pain radiating to buttocks and left lower extremity. She complains of some weakness in the lower legs with recurrent falls. Pain is controlled with Norco 5-325 mg 1-2 tabs QID prn. No numbness in the lower extremities. No incontinence of bowels or bladder. Patient had CT lumbar spine revealing lucency along the L4 and L5 screws suggest instrumentation loosening. Patient reports history of CAD, follows with cardiologist, no recent chest pain/pressure, mild dyspnea on exertion. Patient reports history of DVT right lower extremity, currently takes no anticoagulation. Recently diagnosed with Covid-19 URI, has recovered. No cough, chest pain, dyspnea, fever/chills. No recent pain/swelling in the lower legs. DIAGNOSTIC RESULTS   Radiology:   Narrative   EXAMINATION:   CT OF THE LUMBAR SPINE WITH CONTRAST,  4/8/2020 2:28 pm       TECHNIQUE:   CT of the lumbar spine was performed with the administration of intravenous   contrast. Multiplanar reformatted images are provided for review. Dose   modulation, iterative reconstruction, and/or weight based adjustment of the   mA/kV was utilized to reduce the radiation dose to as low as reasonably   achievable.       COMPARISON:   05/31/2019       HISTORY:   ORDERING SYSTEM PROVIDED HISTORY: Chronic left-sided low back pain with   left-sided sciatica. TECHNOLOGIST PROVIDED HISTORY:   Reason for Exam: Chronic left sided low back pain with left-sided sciatica.    Acuity: Unknown

## 2020-06-12 RX ORDER — SODIUM CHLORIDE 0.9 % (FLUSH) 0.9 %
10 SYRINGE (ML) INJECTION PRN
Status: CANCELLED | OUTPATIENT
Start: 2020-06-12

## 2020-06-12 RX ORDER — SODIUM CHLORIDE 9 MG/ML
INJECTION, SOLUTION INTRAVENOUS CONTINUOUS
Status: CANCELLED | OUTPATIENT
Start: 2020-06-12

## 2020-06-12 RX ORDER — LIDOCAINE HYDROCHLORIDE 10 MG/ML
1 INJECTION, SOLUTION EPIDURAL; INFILTRATION; INTRACAUDAL; PERINEURAL
Status: CANCELLED | OUTPATIENT
Start: 2020-06-12 | End: 2020-06-12

## 2020-06-12 RX ORDER — SODIUM CHLORIDE 0.9 % (FLUSH) 0.9 %
10 SYRINGE (ML) INJECTION EVERY 12 HOURS SCHEDULED
Status: CANCELLED | OUTPATIENT
Start: 2020-06-12

## 2020-06-13 ENCOUNTER — HOSPITAL ENCOUNTER (OUTPATIENT)
Dept: PREADMISSION TESTING | Age: 70
Setting detail: SPECIMEN
Discharge: HOME | End: 2020-06-17
Payer: MEDICARE

## 2020-06-13 PROCEDURE — U0004 COV-19 TEST NON-CDC HGH THRU: HCPCS

## 2020-06-15 ENCOUNTER — TELEPHONE (OUTPATIENT)
Dept: ORTHOPEDIC SURGERY | Age: 70
End: 2020-06-15

## 2020-06-15 LAB
SARS-COV-2, PCR: NOT DETECTED
SARS-COV-2, RAPID: NORMAL
SARS-COV-2: NORMAL
SOURCE: NORMAL

## 2020-06-15 NOTE — TELEPHONE ENCOUNTER
Pt called to follow up on pain medication.      DOS; 04/27/2020-  LUMBAR LAMINECTOMY FUSION POSTERIOR    Please call her asap  Thank you

## 2020-06-17 ENCOUNTER — ANESTHESIA (OUTPATIENT)
Dept: OPERATING ROOM | Age: 70
DRG: 454 | End: 2020-06-17
Payer: MEDICARE

## 2020-06-17 ENCOUNTER — APPOINTMENT (OUTPATIENT)
Dept: GENERAL RADIOLOGY | Age: 70
DRG: 454 | End: 2020-06-17
Attending: ORTHOPAEDIC SURGERY
Payer: MEDICARE

## 2020-06-17 ENCOUNTER — ANESTHESIA EVENT (OUTPATIENT)
Dept: OPERATING ROOM | Age: 70
DRG: 454 | End: 2020-06-17
Payer: MEDICARE

## 2020-06-17 ENCOUNTER — HOSPITAL ENCOUNTER (INPATIENT)
Age: 70
LOS: 2 days | Discharge: NURSING, REHAB OR LONG-TERM CARE | DRG: 454 | End: 2020-06-19
Attending: ORTHOPAEDIC SURGERY | Admitting: ORTHOPAEDIC SURGERY
Payer: MEDICARE

## 2020-06-17 VITALS — SYSTOLIC BLOOD PRESSURE: 135 MMHG | DIASTOLIC BLOOD PRESSURE: 62 MMHG | TEMPERATURE: 97 F | OXYGEN SATURATION: 100 %

## 2020-06-17 LAB
GLUCOSE BLD-MCNC: 124 MG/DL (ref 65–105)
GLUCOSE BLD-MCNC: 136 MG/DL (ref 65–105)
GLUCOSE BLD-MCNC: 98 MG/DL (ref 65–105)

## 2020-06-17 PROCEDURE — 82947 ASSAY GLUCOSE BLOOD QUANT: CPT

## 2020-06-17 PROCEDURE — 3700000000 HC ANESTHESIA ATTENDED CARE: Performed by: ORTHOPAEDIC SURGERY

## 2020-06-17 PROCEDURE — 6370000000 HC RX 637 (ALT 250 FOR IP): Performed by: ORTHOPAEDIC SURGERY

## 2020-06-17 PROCEDURE — 0QP004Z REMOVAL OF INTERNAL FIXATION DEVICE FROM LUMBAR VERTEBRA, OPEN APPROACH: ICD-10-PCS | Performed by: ORTHOPAEDIC SURGERY

## 2020-06-17 PROCEDURE — 6360000002 HC RX W HCPCS: Performed by: ORTHOPAEDIC SURGERY

## 2020-06-17 PROCEDURE — 7100000000 HC PACU RECOVERY - FIRST 15 MIN: Performed by: ORTHOPAEDIC SURGERY

## 2020-06-17 PROCEDURE — 6360000002 HC RX W HCPCS: Performed by: ANESTHESIOLOGY

## 2020-06-17 PROCEDURE — 3600000003 HC SURGERY LEVEL 3 BASE: Performed by: ORTHOPAEDIC SURGERY

## 2020-06-17 PROCEDURE — C1713 ANCHOR/SCREW BN/BN,TIS/BN: HCPCS | Performed by: ORTHOPAEDIC SURGERY

## 2020-06-17 PROCEDURE — 2709999900 HC NON-CHARGEABLE SUPPLY: Performed by: ORTHOPAEDIC SURGERY

## 2020-06-17 PROCEDURE — 2720000010 HC SURG SUPPLY STERILE: Performed by: ORTHOPAEDIC SURGERY

## 2020-06-17 PROCEDURE — 76000 FLUOROSCOPY <1 HR PHYS/QHP: CPT

## 2020-06-17 PROCEDURE — 1200000000 HC SEMI PRIVATE

## 2020-06-17 PROCEDURE — 2580000003 HC RX 258: Performed by: ANESTHESIOLOGY

## 2020-06-17 PROCEDURE — 6360000002 HC RX W HCPCS: Performed by: NURSE ANESTHETIST, CERTIFIED REGISTERED

## 2020-06-17 PROCEDURE — 3700000001 HC ADD 15 MINUTES (ANESTHESIA): Performed by: ORTHOPAEDIC SURGERY

## 2020-06-17 PROCEDURE — 2500000003 HC RX 250 WO HCPCS: Performed by: NURSE ANESTHETIST, CERTIFIED REGISTERED

## 2020-06-17 PROCEDURE — 7100000001 HC PACU RECOVERY - ADDTL 15 MIN: Performed by: ORTHOPAEDIC SURGERY

## 2020-06-17 PROCEDURE — 6370000000 HC RX 637 (ALT 250 FOR IP): Performed by: ANESTHESIOLOGY

## 2020-06-17 PROCEDURE — 0SG307J FUSION OF LUMBOSACRAL JOINT WITH AUTOLOGOUS TISSUE SUBSTITUTE, POSTERIOR APPROACH, ANTERIOR COLUMN, OPEN APPROACH: ICD-10-PCS | Performed by: ORTHOPAEDIC SURGERY

## 2020-06-17 PROCEDURE — 0QH204Z INSERTION OF INTERNAL FIXATION DEVICE INTO RIGHT PELVIC BONE, OPEN APPROACH: ICD-10-PCS | Performed by: ORTHOPAEDIC SURGERY

## 2020-06-17 PROCEDURE — 72100 X-RAY EXAM L-S SPINE 2/3 VWS: CPT

## 2020-06-17 PROCEDURE — 0SG0071 FUSION OF LUMBAR VERTEBRAL JOINT WITH AUTOLOGOUS TISSUE SUBSTITUTE, POSTERIOR APPROACH, POSTERIOR COLUMN, OPEN APPROACH: ICD-10-PCS | Performed by: ORTHOPAEDIC SURGERY

## 2020-06-17 PROCEDURE — 88300 SURGICAL PATH GROSS: CPT

## 2020-06-17 PROCEDURE — 2580000003 HC RX 258: Performed by: ORTHOPAEDIC SURGERY

## 2020-06-17 PROCEDURE — 0QH304Z INSERTION OF INTERNAL FIXATION DEVICE INTO LEFT PELVIC BONE, OPEN APPROACH: ICD-10-PCS | Performed by: ORTHOPAEDIC SURGERY

## 2020-06-17 PROCEDURE — 3600000013 HC SURGERY LEVEL 3 ADDTL 15MIN: Performed by: ORTHOPAEDIC SURGERY

## 2020-06-17 DEVICE — 5.5MM CURVED ROD, 75MM
Type: IMPLANTABLE DEVICE | Site: SPINE LUMBAR | Status: FUNCTIONAL
Brand: REVERE

## 2020-06-17 DEVICE — REVERE LOCKING CAP
Type: IMPLANTABLE DEVICE | Site: SPINE LUMBAR | Status: FUNCTIONAL
Brand: REVERE

## 2020-06-17 DEVICE — 5.5MM REVERE DUAL OUTER DIAMETER POLYAXIAL SCREW 40MM
Type: IMPLANTABLE DEVICE | Site: SPINE LUMBAR | Status: FUNCTIONAL
Brand: REVERE

## 2020-06-17 DEVICE — BONE GRAFT KIT 7510400 INFUSE MEDIUM
Type: IMPLANTABLE DEVICE | Site: SPINE LUMBAR | Status: FUNCTIONAL
Brand: INFUSE® BONE GRAFT

## 2020-06-17 DEVICE — REVERE HEAD OFFSET CONNECTOR, 25MM
Type: IMPLANTABLE DEVICE | Site: SPINE LUMBAR | Status: FUNCTIONAL
Brand: REVERE

## 2020-06-17 DEVICE — 6.5MM REVERE PEDICLE SCREW 50MM
Type: IMPLANTABLE DEVICE | Site: SPINE LUMBAR | Status: FUNCTIONAL
Brand: REVERE

## 2020-06-17 DEVICE — GRAFT BNE CHIP 30 CC FD CORTICAL CANC: Type: IMPLANTABLE DEVICE | Site: SPINE LUMBAR | Status: FUNCTIONAL

## 2020-06-17 DEVICE — 6.5MM REVERE PEDICLE SCREW 40MM
Type: IMPLANTABLE DEVICE | Site: SPINE LUMBAR | Status: FUNCTIONAL
Brand: REVERE

## 2020-06-17 RX ORDER — CEFAZOLIN SODIUM 1 G/3ML
INJECTION, POWDER, FOR SOLUTION INTRAMUSCULAR; INTRAVENOUS PRN
Status: DISCONTINUED | OUTPATIENT
Start: 2020-06-17 | End: 2020-06-17 | Stop reason: SDUPTHER

## 2020-06-17 RX ORDER — DIPHENHYDRAMINE HYDROCHLORIDE 50 MG/ML
12.5 INJECTION INTRAMUSCULAR; INTRAVENOUS
Status: DISCONTINUED | OUTPATIENT
Start: 2020-06-17 | End: 2020-06-17 | Stop reason: HOSPADM

## 2020-06-17 RX ORDER — FENTANYL CITRATE 50 UG/ML
25 INJECTION, SOLUTION INTRAMUSCULAR; INTRAVENOUS EVERY 5 MIN PRN
Status: DISCONTINUED | OUTPATIENT
Start: 2020-06-17 | End: 2020-06-17 | Stop reason: HOSPADM

## 2020-06-17 RX ORDER — ATORVASTATIN CALCIUM 40 MG/1
40 TABLET, FILM COATED ORAL NIGHTLY
Status: DISCONTINUED | OUTPATIENT
Start: 2020-06-17 | End: 2020-06-19 | Stop reason: HOSPADM

## 2020-06-17 RX ORDER — SODIUM CHLORIDE 0.9 % (FLUSH) 0.9 %
10 SYRINGE (ML) INJECTION PRN
Status: DISCONTINUED | OUTPATIENT
Start: 2020-06-17 | End: 2020-06-17 | Stop reason: HOSPADM

## 2020-06-17 RX ORDER — SODIUM CHLORIDE 9 MG/ML
INJECTION, SOLUTION INTRAVENOUS CONTINUOUS
Status: DISCONTINUED | OUTPATIENT
Start: 2020-06-17 | End: 2020-06-17

## 2020-06-17 RX ORDER — PROPOFOL 10 MG/ML
INJECTION, EMULSION INTRAVENOUS PRN
Status: DISCONTINUED | OUTPATIENT
Start: 2020-06-17 | End: 2020-06-17 | Stop reason: SDUPTHER

## 2020-06-17 RX ORDER — ONDANSETRON 2 MG/ML
4 INJECTION INTRAMUSCULAR; INTRAVENOUS
Status: DISCONTINUED | OUTPATIENT
Start: 2020-06-17 | End: 2020-06-17 | Stop reason: HOSPADM

## 2020-06-17 RX ORDER — HYDROCODONE BITARTRATE AND ACETAMINOPHEN 5; 325 MG/1; MG/1
1 TABLET ORAL PRN
Status: DISCONTINUED | OUTPATIENT
Start: 2020-06-17 | End: 2020-06-17 | Stop reason: HOSPADM

## 2020-06-17 RX ORDER — MEPERIDINE HYDROCHLORIDE 25 MG/ML
12.5 INJECTION INTRAMUSCULAR; INTRAVENOUS; SUBCUTANEOUS EVERY 5 MIN PRN
Status: DISCONTINUED | OUTPATIENT
Start: 2020-06-17 | End: 2020-06-17 | Stop reason: HOSPADM

## 2020-06-17 RX ORDER — ROCURONIUM BROMIDE 10 MG/ML
INJECTION, SOLUTION INTRAVENOUS PRN
Status: DISCONTINUED | OUTPATIENT
Start: 2020-06-17 | End: 2020-06-17 | Stop reason: SDUPTHER

## 2020-06-17 RX ORDER — METOCLOPRAMIDE HYDROCHLORIDE 5 MG/ML
10 INJECTION INTRAMUSCULAR; INTRAVENOUS
Status: DISCONTINUED | OUTPATIENT
Start: 2020-06-17 | End: 2020-06-17 | Stop reason: HOSPADM

## 2020-06-17 RX ORDER — GLYCOPYRROLATE 1 MG/5 ML
SYRINGE (ML) INTRAVENOUS PRN
Status: DISCONTINUED | OUTPATIENT
Start: 2020-06-17 | End: 2020-06-17 | Stop reason: SDUPTHER

## 2020-06-17 RX ORDER — OXYCODONE HYDROCHLORIDE 10 MG/1
10 TABLET ORAL EVERY 4 HOURS PRN
Status: DISCONTINUED | OUTPATIENT
Start: 2020-06-17 | End: 2020-06-19 | Stop reason: HOSPADM

## 2020-06-17 RX ORDER — MORPHINE SULFATE 10 MG/ML
INJECTION, SOLUTION INTRAMUSCULAR; INTRAVENOUS PRN
Status: DISCONTINUED | OUTPATIENT
Start: 2020-06-17 | End: 2020-06-17 | Stop reason: SDUPTHER

## 2020-06-17 RX ORDER — PROMETHAZINE HYDROCHLORIDE 25 MG/1
12.5 TABLET ORAL EVERY 6 HOURS PRN
Status: DISCONTINUED | OUTPATIENT
Start: 2020-06-17 | End: 2020-06-19 | Stop reason: HOSPADM

## 2020-06-17 RX ORDER — FENTANYL CITRATE 50 UG/ML
50 INJECTION, SOLUTION INTRAMUSCULAR; INTRAVENOUS EVERY 5 MIN PRN
Status: DISCONTINUED | OUTPATIENT
Start: 2020-06-17 | End: 2020-06-17 | Stop reason: HOSPADM

## 2020-06-17 RX ORDER — SCOLOPAMINE TRANSDERMAL SYSTEM 1 MG/1
1 PATCH, EXTENDED RELEASE TRANSDERMAL ONCE
Status: DISCONTINUED | OUTPATIENT
Start: 2020-06-17 | End: 2020-06-19 | Stop reason: HOSPADM

## 2020-06-17 RX ORDER — PANTOPRAZOLE SODIUM 40 MG/1
40 TABLET, DELAYED RELEASE ORAL
Status: DISCONTINUED | OUTPATIENT
Start: 2020-06-17 | End: 2020-06-19 | Stop reason: HOSPADM

## 2020-06-17 RX ORDER — ONDANSETRON 2 MG/ML
4 INJECTION INTRAMUSCULAR; INTRAVENOUS EVERY 6 HOURS PRN
Status: DISCONTINUED | OUTPATIENT
Start: 2020-06-17 | End: 2020-06-19 | Stop reason: HOSPADM

## 2020-06-17 RX ORDER — FENOFIBRATE 160 MG/1
160 TABLET ORAL DAILY
Status: DISCONTINUED | OUTPATIENT
Start: 2020-06-17 | End: 2020-06-19 | Stop reason: HOSPADM

## 2020-06-17 RX ORDER — PHENYLEPHRINE HYDROCHLORIDE 10 MG/ML
INJECTION INTRAVENOUS PRN
Status: DISCONTINUED | OUTPATIENT
Start: 2020-06-17 | End: 2020-06-17 | Stop reason: SDUPTHER

## 2020-06-17 RX ORDER — HYDROCODONE BITARTRATE AND ACETAMINOPHEN 5; 325 MG/1; MG/1
2 TABLET ORAL PRN
Status: DISCONTINUED | OUTPATIENT
Start: 2020-06-17 | End: 2020-06-17 | Stop reason: HOSPADM

## 2020-06-17 RX ORDER — CYCLOBENZAPRINE HCL 10 MG
10 TABLET ORAL 3 TIMES DAILY PRN
Status: DISCONTINUED | OUTPATIENT
Start: 2020-06-17 | End: 2020-06-19 | Stop reason: HOSPADM

## 2020-06-17 RX ORDER — PRAMIPEXOLE DIHYDROCHLORIDE 1.5 MG/1
1.5 TABLET ORAL NIGHTLY
Status: DISCONTINUED | OUTPATIENT
Start: 2020-06-18 | End: 2020-06-19 | Stop reason: HOSPADM

## 2020-06-17 RX ORDER — MORPHINE SULFATE 2 MG/ML
2 INJECTION, SOLUTION INTRAMUSCULAR; INTRAVENOUS EVERY 5 MIN PRN
Status: DISCONTINUED | OUTPATIENT
Start: 2020-06-17 | End: 2020-06-17 | Stop reason: HOSPADM

## 2020-06-17 RX ORDER — LIDOCAINE HYDROCHLORIDE 10 MG/ML
1 INJECTION, SOLUTION EPIDURAL; INFILTRATION; INTRACAUDAL; PERINEURAL
Status: DISCONTINUED | OUTPATIENT
Start: 2020-06-17 | End: 2020-06-17 | Stop reason: HOSPADM

## 2020-06-17 RX ORDER — SODIUM CHLORIDE 0.9 % (FLUSH) 0.9 %
10 SYRINGE (ML) INJECTION PRN
Status: DISCONTINUED | OUTPATIENT
Start: 2020-06-17 | End: 2020-06-19 | Stop reason: HOSPADM

## 2020-06-17 RX ORDER — FENOFIBRATE 134 MG/1
134 CAPSULE ORAL
Status: DISCONTINUED | OUTPATIENT
Start: 2020-06-18 | End: 2020-06-17 | Stop reason: CLARIF

## 2020-06-17 RX ORDER — HYDRALAZINE HYDROCHLORIDE 20 MG/ML
5 INJECTION INTRAMUSCULAR; INTRAVENOUS EVERY 10 MIN PRN
Status: DISCONTINUED | OUTPATIENT
Start: 2020-06-17 | End: 2020-06-17 | Stop reason: HOSPADM

## 2020-06-17 RX ORDER — FENTANYL CITRATE 50 UG/ML
INJECTION, SOLUTION INTRAMUSCULAR; INTRAVENOUS PRN
Status: DISCONTINUED | OUTPATIENT
Start: 2020-06-17 | End: 2020-06-17 | Stop reason: SDUPTHER

## 2020-06-17 RX ORDER — PRAMIPEXOLE DIHYDROCHLORIDE 1.5 MG/1
1.5 TABLET ORAL DAILY
Status: DISCONTINUED | OUTPATIENT
Start: 2020-06-17 | End: 2020-06-17

## 2020-06-17 RX ORDER — PROPOFOL 10 MG/ML
INJECTION, EMULSION INTRAVENOUS CONTINUOUS PRN
Status: DISCONTINUED | OUTPATIENT
Start: 2020-06-17 | End: 2020-06-17 | Stop reason: SDUPTHER

## 2020-06-17 RX ORDER — KETAMINE HYDROCHLORIDE 50 MG/ML
INJECTION, SOLUTION, CONCENTRATE INTRAMUSCULAR; INTRAVENOUS PRN
Status: DISCONTINUED | OUTPATIENT
Start: 2020-06-17 | End: 2020-06-17 | Stop reason: SDUPTHER

## 2020-06-17 RX ORDER — CITALOPRAM 20 MG/1
10 TABLET ORAL DAILY
Status: DISCONTINUED | OUTPATIENT
Start: 2020-06-17 | End: 2020-06-19 | Stop reason: HOSPADM

## 2020-06-17 RX ORDER — LISINOPRIL 20 MG/1
20 TABLET ORAL DAILY
Status: DISCONTINUED | OUTPATIENT
Start: 2020-06-17 | End: 2020-06-19 | Stop reason: HOSPADM

## 2020-06-17 RX ORDER — OXYCODONE HYDROCHLORIDE 5 MG/1
5 TABLET ORAL EVERY 4 HOURS PRN
Status: DISCONTINUED | OUTPATIENT
Start: 2020-06-17 | End: 2020-06-19 | Stop reason: HOSPADM

## 2020-06-17 RX ORDER — SODIUM CHLORIDE 0.9 % (FLUSH) 0.9 %
10 SYRINGE (ML) INJECTION EVERY 12 HOURS SCHEDULED
Status: DISCONTINUED | OUTPATIENT
Start: 2020-06-17 | End: 2020-06-17 | Stop reason: HOSPADM

## 2020-06-17 RX ORDER — MORPHINE SULFATE 2 MG/ML
2 INJECTION, SOLUTION INTRAMUSCULAR; INTRAVENOUS
Status: DISCONTINUED | OUTPATIENT
Start: 2020-06-17 | End: 2020-06-19 | Stop reason: HOSPADM

## 2020-06-17 RX ORDER — ONDANSETRON 2 MG/ML
INJECTION INTRAMUSCULAR; INTRAVENOUS PRN
Status: DISCONTINUED | OUTPATIENT
Start: 2020-06-17 | End: 2020-06-17 | Stop reason: SDUPTHER

## 2020-06-17 RX ORDER — SODIUM CHLORIDE 0.9 % (FLUSH) 0.9 %
10 SYRINGE (ML) INJECTION EVERY 12 HOURS SCHEDULED
Status: DISCONTINUED | OUTPATIENT
Start: 2020-06-17 | End: 2020-06-19 | Stop reason: HOSPADM

## 2020-06-17 RX ORDER — DEXAMETHASONE SODIUM PHOSPHATE 4 MG/ML
INJECTION, SOLUTION INTRA-ARTICULAR; INTRALESIONAL; INTRAMUSCULAR; INTRAVENOUS; SOFT TISSUE PRN
Status: DISCONTINUED | OUTPATIENT
Start: 2020-06-17 | End: 2020-06-17 | Stop reason: SDUPTHER

## 2020-06-17 RX ORDER — NITROGLYCERIN 0.4 MG/1
0.4 TABLET SUBLINGUAL EVERY 5 MIN PRN
Status: DISCONTINUED | OUTPATIENT
Start: 2020-06-17 | End: 2020-06-19 | Stop reason: HOSPADM

## 2020-06-17 RX ORDER — TRANEXAMIC ACID 100 MG/ML
INJECTION, SOLUTION INTRAVENOUS PRN
Status: DISCONTINUED | OUTPATIENT
Start: 2020-06-17 | End: 2020-06-17 | Stop reason: SDUPTHER

## 2020-06-17 RX ORDER — MORPHINE SULFATE 4 MG/ML
4 INJECTION, SOLUTION INTRAMUSCULAR; INTRAVENOUS
Status: DISCONTINUED | OUTPATIENT
Start: 2020-06-17 | End: 2020-06-19 | Stop reason: HOSPADM

## 2020-06-17 RX ORDER — FUROSEMIDE 40 MG/1
40 TABLET ORAL 2 TIMES DAILY
Status: DISCONTINUED | OUTPATIENT
Start: 2020-06-17 | End: 2020-06-19 | Stop reason: HOSPADM

## 2020-06-17 RX ORDER — FERROUS SULFATE 325(65) MG
325 TABLET ORAL
Status: DISCONTINUED | OUTPATIENT
Start: 2020-06-18 | End: 2020-06-19 | Stop reason: HOSPADM

## 2020-06-17 RX ORDER — CARVEDILOL 6.25 MG/1
6.25 TABLET ORAL 2 TIMES DAILY
Status: DISCONTINUED | OUTPATIENT
Start: 2020-06-17 | End: 2020-06-19 | Stop reason: HOSPADM

## 2020-06-17 RX ORDER — LABETALOL 20 MG/4 ML (5 MG/ML) INTRAVENOUS SYRINGE
5 EVERY 10 MIN PRN
Status: DISCONTINUED | OUTPATIENT
Start: 2020-06-17 | End: 2020-06-17 | Stop reason: HOSPADM

## 2020-06-17 RX ORDER — MIDAZOLAM HYDROCHLORIDE 1 MG/ML
INJECTION INTRAMUSCULAR; INTRAVENOUS PRN
Status: DISCONTINUED | OUTPATIENT
Start: 2020-06-17 | End: 2020-06-17 | Stop reason: SDUPTHER

## 2020-06-17 RX ORDER — HYDRALAZINE HYDROCHLORIDE 20 MG/ML
INJECTION INTRAMUSCULAR; INTRAVENOUS PRN
Status: DISCONTINUED | OUTPATIENT
Start: 2020-06-17 | End: 2020-06-17 | Stop reason: SDUPTHER

## 2020-06-17 RX ORDER — LIDOCAINE HYDROCHLORIDE 10 MG/ML
INJECTION, SOLUTION EPIDURAL; INFILTRATION; INTRACAUDAL; PERINEURAL PRN
Status: DISCONTINUED | OUTPATIENT
Start: 2020-06-17 | End: 2020-06-17 | Stop reason: SDUPTHER

## 2020-06-17 RX ADMIN — FENTANYL CITRATE 50 MCG: 50 INJECTION, SOLUTION INTRAMUSCULAR; INTRAVENOUS at 08:31

## 2020-06-17 RX ADMIN — Medication 10 ML: at 21:37

## 2020-06-17 RX ADMIN — ROCURONIUM BROMIDE 50 MG: 10 INJECTION, SOLUTION INTRAVENOUS at 08:31

## 2020-06-17 RX ADMIN — CITALOPRAM HYDROBROMIDE 10 MG: 20 TABLET ORAL at 16:55

## 2020-06-17 RX ADMIN — ONDANSETRON 4 MG: 2 INJECTION INTRAMUSCULAR; INTRAVENOUS at 14:01

## 2020-06-17 RX ADMIN — LIDOCAINE HYDROCHLORIDE 50 MG: 10 INJECTION, SOLUTION EPIDURAL; INFILTRATION; INTRACAUDAL; PERINEURAL at 08:31

## 2020-06-17 RX ADMIN — MORPHINE SULFATE 2 MG: 10 INJECTION, SOLUTION INTRAMUSCULAR; INTRAVENOUS at 14:29

## 2020-06-17 RX ADMIN — PHENYLEPHRINE HYDROCHLORIDE 50 MCG: 10 INJECTION INTRAVENOUS at 10:38

## 2020-06-17 RX ADMIN — CEFAZOLIN 2000 MG: 1 INJECTION, POWDER, FOR SOLUTION INTRAMUSCULAR; INTRAVENOUS at 13:00

## 2020-06-17 RX ADMIN — PROPOFOL 75 MCG/KG/MIN: 10 INJECTION, EMULSION INTRAVENOUS at 08:55

## 2020-06-17 RX ADMIN — OXYCODONE HYDROCHLORIDE 10 MG: 10 TABLET ORAL at 16:54

## 2020-06-17 RX ADMIN — Medication 0.2 MG: at 08:24

## 2020-06-17 RX ADMIN — LISINOPRIL 20 MG: 20 TABLET ORAL at 16:55

## 2020-06-17 RX ADMIN — PHENYLEPHRINE HYDROCHLORIDE 50 MCG: 10 INJECTION INTRAVENOUS at 08:31

## 2020-06-17 RX ADMIN — PROPOFOL 150 MG: 10 INJECTION, EMULSION INTRAVENOUS at 08:31

## 2020-06-17 RX ADMIN — PROPOFOL 50 MG: 10 INJECTION, EMULSION INTRAVENOUS at 14:17

## 2020-06-17 RX ADMIN — CEFAZOLIN 2 G: 10 INJECTION, POWDER, FOR SOLUTION INTRAVENOUS at 13:00

## 2020-06-17 RX ADMIN — CEFAZOLIN: 1 INJECTION, POWDER, FOR SOLUTION INTRAMUSCULAR; INTRAVENOUS at 20:36

## 2020-06-17 RX ADMIN — MORPHINE SULFATE 2 MG: 10 INJECTION, SOLUTION INTRAMUSCULAR; INTRAVENOUS at 14:11

## 2020-06-17 RX ADMIN — PANTOPRAZOLE SODIUM 40 MG: 40 TABLET, DELAYED RELEASE ORAL at 16:55

## 2020-06-17 RX ADMIN — FENTANYL CITRATE 50 MCG: 50 INJECTION, SOLUTION INTRAMUSCULAR; INTRAVENOUS at 09:00

## 2020-06-17 RX ADMIN — PHENYLEPHRINE HYDROCHLORIDE 50 MCG: 10 INJECTION INTRAVENOUS at 13:27

## 2020-06-17 RX ADMIN — HYDRALAZINE HYDROCHLORIDE 5 MG: 20 INJECTION, SOLUTION INTRAMUSCULAR; INTRAVENOUS at 09:17

## 2020-06-17 RX ADMIN — CARVEDILOL 6.25 MG: 6.25 TABLET, FILM COATED ORAL at 20:35

## 2020-06-17 RX ADMIN — CEFAZOLIN 2 G: 10 INJECTION, POWDER, FOR SOLUTION INTRAVENOUS at 09:00

## 2020-06-17 RX ADMIN — FUROSEMIDE 40 MG: 40 TABLET ORAL at 16:55

## 2020-06-17 RX ADMIN — MORPHINE SULFATE 2 MG: 10 INJECTION, SOLUTION INTRAMUSCULAR; INTRAVENOUS at 10:04

## 2020-06-17 RX ADMIN — KETAMINE HYDROCHLORIDE 50 MG: 50 INJECTION, SOLUTION INTRAMUSCULAR; INTRAVENOUS at 13:14

## 2020-06-17 RX ADMIN — SODIUM CHLORIDE: 9 INJECTION, SOLUTION INTRAVENOUS at 14:22

## 2020-06-17 RX ADMIN — TRANEXAMIC ACID 1000 MG: 100 INJECTION, SOLUTION INTRAVENOUS at 14:06

## 2020-06-17 RX ADMIN — OXYCODONE HYDROCHLORIDE 10 MG: 10 TABLET ORAL at 22:06

## 2020-06-17 RX ADMIN — DEXAMETHASONE SODIUM PHOSPHATE 4 MG: 4 INJECTION, SOLUTION INTRA-ARTICULAR; INTRALESIONAL; INTRAMUSCULAR; INTRAVENOUS; SOFT TISSUE at 09:05

## 2020-06-17 RX ADMIN — SODIUM CHLORIDE: 9 INJECTION, SOLUTION INTRAVENOUS at 08:01

## 2020-06-17 RX ADMIN — FENTANYL CITRATE 50 MCG: 50 INJECTION, SOLUTION INTRAMUSCULAR; INTRAVENOUS at 14:47

## 2020-06-17 RX ADMIN — Medication 1 TABLET: at 20:35

## 2020-06-17 RX ADMIN — MORPHINE SULFATE 2 MG: 10 INJECTION, SOLUTION INTRAMUSCULAR; INTRAVENOUS at 09:28

## 2020-06-17 RX ADMIN — MIDAZOLAM 2 MG: 1 INJECTION INTRAMUSCULAR; INTRAVENOUS at 08:24

## 2020-06-17 RX ADMIN — KETAMINE HYDROCHLORIDE 50 MG: 50 INJECTION, SOLUTION INTRAMUSCULAR; INTRAVENOUS at 09:00

## 2020-06-17 RX ADMIN — MORPHINE SULFATE 2 MG: 10 INJECTION, SOLUTION INTRAMUSCULAR; INTRAVENOUS at 14:04

## 2020-06-17 RX ADMIN — DEXAMETHASONE SODIUM PHOSPHATE 4 MG: 4 INJECTION, SOLUTION INTRA-ARTICULAR; INTRALESIONAL; INTRAMUSCULAR; INTRAVENOUS; SOFT TISSUE at 12:09

## 2020-06-17 RX ADMIN — TRANEXAMIC ACID 1000 MG: 100 INJECTION, SOLUTION INTRAVENOUS at 09:03

## 2020-06-17 RX ADMIN — PHENYLEPHRINE HYDROCHLORIDE 100 MCG: 10 INJECTION INTRAVENOUS at 08:44

## 2020-06-17 RX ADMIN — ATORVASTATIN CALCIUM 40 MG: 40 TABLET, FILM COATED ORAL at 20:35

## 2020-06-17 RX ADMIN — SODIUM CHLORIDE: 9 INJECTION, SOLUTION INTRAVENOUS at 12:09

## 2020-06-17 RX ADMIN — FENTANYL CITRATE 50 MCG: 50 INJECTION, SOLUTION INTRAMUSCULAR; INTRAVENOUS at 14:53

## 2020-06-17 RX ADMIN — HYDRALAZINE HYDROCHLORIDE 5 MG: 20 INJECTION, SOLUTION INTRAMUSCULAR; INTRAVENOUS at 14:11

## 2020-06-17 RX ADMIN — FENOFIBRATE 160 MG: 160 TABLET ORAL at 16:55

## 2020-06-17 ASSESSMENT — PULMONARY FUNCTION TESTS
PIF_VALUE: 17
PIF_VALUE: 19
PIF_VALUE: 18
PIF_VALUE: 19
PIF_VALUE: 2
PIF_VALUE: 18
PIF_VALUE: 18
PIF_VALUE: 19
PIF_VALUE: 17
PIF_VALUE: 18
PIF_VALUE: 19
PIF_VALUE: 19
PIF_VALUE: 18
PIF_VALUE: 18
PIF_VALUE: 19
PIF_VALUE: 19
PIF_VALUE: 17
PIF_VALUE: 17
PIF_VALUE: 19
PIF_VALUE: 17
PIF_VALUE: 18
PIF_VALUE: 19
PIF_VALUE: 18
PIF_VALUE: 18
PIF_VALUE: 19
PIF_VALUE: 3
PIF_VALUE: 17
PIF_VALUE: 2
PIF_VALUE: 18
PIF_VALUE: 19
PIF_VALUE: 18
PIF_VALUE: 16
PIF_VALUE: 17
PIF_VALUE: 18
PIF_VALUE: 19
PIF_VALUE: 18
PIF_VALUE: 2
PIF_VALUE: 19
PIF_VALUE: 2
PIF_VALUE: 18
PIF_VALUE: 1
PIF_VALUE: 18
PIF_VALUE: 17
PIF_VALUE: 18
PIF_VALUE: 18
PIF_VALUE: 19
PIF_VALUE: 17
PIF_VALUE: 17
PIF_VALUE: 0
PIF_VALUE: 17
PIF_VALUE: 1
PIF_VALUE: 0
PIF_VALUE: 0
PIF_VALUE: 19
PIF_VALUE: 18
PIF_VALUE: 18
PIF_VALUE: 2
PIF_VALUE: 18
PIF_VALUE: 17
PIF_VALUE: 18
PIF_VALUE: 18
PIF_VALUE: 19
PIF_VALUE: 17
PIF_VALUE: 19
PIF_VALUE: 18
PIF_VALUE: 2
PIF_VALUE: 18
PIF_VALUE: 18
PIF_VALUE: 17
PIF_VALUE: 18
PIF_VALUE: 19
PIF_VALUE: 18
PIF_VALUE: 17
PIF_VALUE: 19
PIF_VALUE: 19
PIF_VALUE: 18
PIF_VALUE: 17
PIF_VALUE: 17
PIF_VALUE: 18
PIF_VALUE: 18
PIF_VALUE: 20
PIF_VALUE: 18
PIF_VALUE: 18
PIF_VALUE: 17
PIF_VALUE: 17
PIF_VALUE: 18
PIF_VALUE: 18
PIF_VALUE: 19
PIF_VALUE: 19
PIF_VALUE: 20
PIF_VALUE: 18
PIF_VALUE: 18
PIF_VALUE: 17
PIF_VALUE: 18
PIF_VALUE: 18
PIF_VALUE: 19
PIF_VALUE: 18
PIF_VALUE: 17
PIF_VALUE: 19
PIF_VALUE: 18
PIF_VALUE: 19
PIF_VALUE: 19
PIF_VALUE: 18
PIF_VALUE: 19
PIF_VALUE: 18
PIF_VALUE: 19
PIF_VALUE: 6
PIF_VALUE: 18
PIF_VALUE: 17
PIF_VALUE: 19
PIF_VALUE: 18
PIF_VALUE: 39
PIF_VALUE: 17
PIF_VALUE: 19
PIF_VALUE: 18
PIF_VALUE: 19
PIF_VALUE: 18
PIF_VALUE: 19
PIF_VALUE: 19
PIF_VALUE: 2
PIF_VALUE: 17
PIF_VALUE: 17
PIF_VALUE: 2
PIF_VALUE: 18
PIF_VALUE: 19
PIF_VALUE: 5
PIF_VALUE: 18
PIF_VALUE: 19
PIF_VALUE: 18
PIF_VALUE: 10
PIF_VALUE: 16
PIF_VALUE: 19
PIF_VALUE: 18
PIF_VALUE: 18
PIF_VALUE: 19
PIF_VALUE: 19
PIF_VALUE: 18
PIF_VALUE: 15
PIF_VALUE: 17
PIF_VALUE: 18
PIF_VALUE: 19
PIF_VALUE: 1
PIF_VALUE: 18
PIF_VALUE: 19
PIF_VALUE: 18
PIF_VALUE: 19
PIF_VALUE: 18
PIF_VALUE: 18
PIF_VALUE: 13
PIF_VALUE: 17
PIF_VALUE: 18
PIF_VALUE: 18
PIF_VALUE: 19
PIF_VALUE: 20
PIF_VALUE: 1
PIF_VALUE: 18
PIF_VALUE: 19
PIF_VALUE: 18
PIF_VALUE: 19
PIF_VALUE: 20
PIF_VALUE: 19
PIF_VALUE: 18
PIF_VALUE: 19
PIF_VALUE: 19
PIF_VALUE: 1
PIF_VALUE: 19
PIF_VALUE: 15
PIF_VALUE: 18
PIF_VALUE: 2
PIF_VALUE: 19
PIF_VALUE: 18
PIF_VALUE: 18
PIF_VALUE: 19
PIF_VALUE: 20
PIF_VALUE: 18
PIF_VALUE: 4
PIF_VALUE: 19
PIF_VALUE: 19
PIF_VALUE: 20
PIF_VALUE: 17
PIF_VALUE: 13
PIF_VALUE: 18
PIF_VALUE: 2
PIF_VALUE: 20
PIF_VALUE: 31
PIF_VALUE: 18
PIF_VALUE: 19
PIF_VALUE: 18
PIF_VALUE: 19
PIF_VALUE: 18
PIF_VALUE: 18
PIF_VALUE: 2
PIF_VALUE: 0
PIF_VALUE: 15
PIF_VALUE: 19
PIF_VALUE: 18
PIF_VALUE: 18
PIF_VALUE: 19
PIF_VALUE: 0
PIF_VALUE: 19
PIF_VALUE: 13
PIF_VALUE: 2
PIF_VALUE: 11
PIF_VALUE: 18
PIF_VALUE: 0
PIF_VALUE: 19
PIF_VALUE: 18
PIF_VALUE: 19
PIF_VALUE: 18
PIF_VALUE: 17
PIF_VALUE: 19
PIF_VALUE: 18
PIF_VALUE: 17
PIF_VALUE: 18
PIF_VALUE: 18
PIF_VALUE: 2
PIF_VALUE: 18
PIF_VALUE: 19
PIF_VALUE: 2
PIF_VALUE: 18
PIF_VALUE: 18
PIF_VALUE: 2
PIF_VALUE: 18
PIF_VALUE: 17
PIF_VALUE: 19
PIF_VALUE: 18
PIF_VALUE: 18
PIF_VALUE: 2
PIF_VALUE: 18
PIF_VALUE: 17
PIF_VALUE: 20
PIF_VALUE: 18
PIF_VALUE: 18
PIF_VALUE: 19
PIF_VALUE: 18
PIF_VALUE: 15
PIF_VALUE: 18
PIF_VALUE: 17
PIF_VALUE: 18
PIF_VALUE: 2
PIF_VALUE: 18
PIF_VALUE: 17
PIF_VALUE: 18
PIF_VALUE: 17
PIF_VALUE: 18
PIF_VALUE: 19
PIF_VALUE: 18
PIF_VALUE: 17
PIF_VALUE: 18
PIF_VALUE: 17
PIF_VALUE: 18
PIF_VALUE: 19
PIF_VALUE: 17
PIF_VALUE: 18
PIF_VALUE: 17
PIF_VALUE: 18
PIF_VALUE: 19
PIF_VALUE: 19
PIF_VALUE: 18
PIF_VALUE: 17
PIF_VALUE: 18
PIF_VALUE: 18
PIF_VALUE: 17
PIF_VALUE: 17
PIF_VALUE: 19
PIF_VALUE: 19
PIF_VALUE: 18
PIF_VALUE: 17
PIF_VALUE: 18
PIF_VALUE: 17
PIF_VALUE: 17
PIF_VALUE: 16
PIF_VALUE: 17
PIF_VALUE: 18
PIF_VALUE: 18
PIF_VALUE: 13
PIF_VALUE: 18
PIF_VALUE: 17
PIF_VALUE: 19
PIF_VALUE: 17
PIF_VALUE: 18
PIF_VALUE: 18
PIF_VALUE: 19
PIF_VALUE: 18
PIF_VALUE: 2
PIF_VALUE: 3
PIF_VALUE: 19
PIF_VALUE: 2
PIF_VALUE: 18
PIF_VALUE: 18
PIF_VALUE: 20
PIF_VALUE: 18
PIF_VALUE: 19
PIF_VALUE: 19

## 2020-06-17 ASSESSMENT — PAIN DESCRIPTION - ORIENTATION
ORIENTATION: MID;LOWER
ORIENTATION: LOWER
ORIENTATION: MID;LOWER
ORIENTATION: MID;LOWER

## 2020-06-17 ASSESSMENT — PAIN DESCRIPTION - LOCATION
LOCATION: INCISION
LOCATION: INCISION;BACK
LOCATION: INCISION
LOCATION: INCISION;BACK

## 2020-06-17 ASSESSMENT — PAIN DESCRIPTION - PAIN TYPE
TYPE: SURGICAL PAIN

## 2020-06-17 ASSESSMENT — PAIN - FUNCTIONAL ASSESSMENT: PAIN_FUNCTIONAL_ASSESSMENT: 0-10

## 2020-06-17 ASSESSMENT — ENCOUNTER SYMPTOMS
STRIDOR: 0
SHORTNESS OF BREATH: 1

## 2020-06-17 ASSESSMENT — PAIN SCALES - GENERAL
PAINLEVEL_OUTOF10: 10
PAINLEVEL_OUTOF10: 8
PAINLEVEL_OUTOF10: 9
PAINLEVEL_OUTOF10: 4
PAINLEVEL_OUTOF10: 8
PAINLEVEL_OUTOF10: 0
PAINLEVEL_OUTOF10: 7

## 2020-06-17 ASSESSMENT — PAIN DESCRIPTION - DESCRIPTORS
DESCRIPTORS: ACHING;SORE
DESCRIPTORS: ACHING
DESCRIPTORS: ACHING

## 2020-06-17 NOTE — PROGRESS NOTES
[address]  DVT Prophylaxis and Vaccine Status  Work List  Mandatory for all patients      Patient must be on both Chemical prophylaxis and Mechanical prophylaxis.  If chemical/mechanical prophylaxis is not ordered, the physician must document a reason for not using prophylaxis     Chemical Prophylaxis  Is patient on chemical prophylaxis: Yes  If no chemical prophylaxis Is a order in for No Chemical VTE prophylaxis N/A  If no was the physician notified not applicable      Mechanical Prophylaxis  Is patient on mechanical prophylaxis, intermittent pneumatic compression device: Yes  If no was the physician notified not applicable        Pneumonia Vaccine  Vaccine indicated:  Not indicated  If indicated was the vaccine given: not applicable    Influenza Vaccine (applicable from October through March):  Vaccine indicated: Not indicated  If indicated was the vaccine given: not applicable    Patient Education  Education completed on DVT prophylaxis: yes

## 2020-06-17 NOTE — ANESTHESIA POSTPROCEDURE EVALUATION
Department of Anesthesiology  Postprocedure Note    Patient: Rosie Croft  MRN: [id number]  YOB: 1950  Date of evaluation: 6/17/2020  Time:  3:55 PM     Procedure Summary     Date:  06/17/20 Room / Location:  [address] [place] [address] [facility]    Anesthesia Start:  0553 Anesthesia Stop:  0837    Procedure:  L5-S1 PLIF L4-L5 REVISION (N/A Spine Lumbar) Diagnosis:  (L5 FRACTURE)    Surgeon:  Marlee Alex MD Responsible Provider:  Yani Estrella MD    Anesthesia Type:  general ASA Status:  3          Anesthesia Type: general    Elver Phase I: Elver Score: 8    Elver Phase II:      Last vitals: Reviewed and per EMR flowsheets.        Anesthesia Post Evaluation    Comments: POST- ANESTHESIA EVALUATION       Pt Name: Rosie Croft  MRN: [id number]  YOB: 1950  Date of evaluation: 6/17/2020  Time:  3:55 PM      BP (!) 127/48   Pulse 93   Temp 97.9 °F (36.6 °C) (Oral)   Resp 20   Ht 5' 2\" (1.575 m)   Wt 146 lb (66.2 kg)   SpO2 95%   BMI 26.70 kg/m²      Consciousness Level  Awake  Cardiopulmonary Status  Stable  Pain Adequately Treated YES  Nausea / Vomiting  NO  Adequate Hydration  YES  Anesthesia Related Complications NONE      Electronically signed by Sesar Mejia MD on 6/17/2020 at 3:55 PM

## 2020-06-17 NOTE — ANESTHESIA PRE PROCEDURE
Lateral epicondylitis M77.10    Allergic rhinitis J30.9    Vitamin D deficiency E55.9    Depression F32.9    Degenerative joint disease (DJD) of hip M16.9    Peripheral edema R60.9    Injury of foot, left B02.285P    Facial droop R29.810    CVA (cerebral vascular accident) ([facility] [address] I63.9    Bradycardia R00.1    Ataxia R27.0    Aphasia R47.01    TIA (transient ischemic attack) G45.9    Need for prophylactic vaccination and inoculation against cholera alone Z23    Chest pain R07.9    Osteopenia M85.80    Lumbago M54.5    Facet arthritis of lumbar region M47.816    Meralgia paresthetica of right side G57.11    Lumbar degenerative disc disease M51.36    Spondylosis of lumbar region without myelopathy or radiculopathy M47.816    Blood poisoning QER2929    Cellulitis of left lower extremity L03. 80    Encounter for medication monitoring Z51.81    Deep vein thrombosis (DVT) of right lower extremity (HCC) I82.401    Lumbar facet arthropathy M47.816    Shortness of breath R06.02    Chronic deep vein thrombosis (DVT) of proximal vein of both lower extremities ([facility]) I82.5Y3    Chronic diastolic heart failure ([facility]) I50.32    Neurogenic claudication due to lumbar spinal stenosis M48.062    Sacroiliitis ([facility]) M46.1    Stage 3 chronic kidney disease ([facility]) N18.3    Type 2 diabetes mellitus with circulatory disorder, without long-term current use of insulin ([facility]) E11.59    Chronic deep vein thrombosis (DVT) of popliteal vein of right lower extremity ([facility]) I82.531    Closed fracture of left wrist S62.102A    B12 deficiency E53.8    Iron deficiency anemia secondary to inadequate dietary iron intake D50.8    Diarrhea due to malabsorption K90.9, R19.7    Closed head injury S09.90XA    Scalp laceration S01. 01XA    Abnormal finding on imaging R93.89    Other irritable bowel syndrome K58.8    Frequent falls R29.6    Double vision H53.2    Muscle soreness M79.10    GI bleed K92.2    Peptic ulcer BMI:   Wt Readings from Last 3 Encounters:   06/17/20 146 lb (66.2 kg)   06/11/20 146 lb (66.2 kg)   05/14/20 149 lb 14.6 oz (68 kg)     Body mass index is 26.7 kg/m². CBC:   Lab Results   Component Value Date    WBC 6.9 06/03/2020    RBC 3.57 06/03/2020    HGB 11.1 06/03/2020    HCT 33.4 06/03/2020    MCV 93.6 06/03/2020    RDW 17.7 06/03/2020     06/03/2020       CMP:   Lab Results   Component Value Date     06/11/2020    K 3.6 06/11/2020     06/11/2020    CO2 24 06/11/2020    BUN 46 06/11/2020    CREATININE 1.09 06/11/2020    GFRAA >60 06/11/2020    LABGLOM 50 06/11/2020    GLUCOSE 152 06/11/2020    PROT 7.2 01/27/2020    CALCIUM 9.2 06/11/2020    BILITOT 0.31 01/27/2020    ALKPHOS 39 01/27/2020    AST 22 01/27/2020    ALT 17 01/27/2020       POC Tests: No results for input(s): POCGLU, POCNA, POCK, POCCL, POCBUN, POCHEMO, POCHCT in the last 72 hours. Coags:   Lab Results   Component Value Date    PROTIME 13.1 04/08/2020    INR 1.0 04/08/2020    APTT 30.2 04/08/2020       HCG (If Applicable): No results found for: PREGTESTUR, PREGSERUM, HCG, HCGQUANT     ABGs: No results found for: PHART, PO2ART, MLU1EQF, AUV3IKV, BEART, I1OKWJGG     Type & Screen (If Applicable):  No results found for: LABABO, LABRH    Drug/Infectious Status (If Applicable):  No results found for: HIV, HEPCAB    COVID-19 Screening (If Applicable):   Lab Results   Component Value Date    COVID19 Not Detected 06/13/2020         Anesthesia Evaluation  Patient summary reviewed and Nursing notes reviewed   history of anesthetic complications: PONV.   Airway: Mallampati: II  TM distance: >3 FB   Neck ROM: full  Mouth opening: > = 3 FB Dental: normal exam         Pulmonary: breath sounds clear to auscultation  (+) shortness of breath:      (-) rhonchi, wheezes, rales and stridor                           Cardiovascular:    (+) hypertension: no interval change, CAD: no

## 2020-06-17 NOTE — PLAN OF CARE
Problem: Falls - Risk of:  Goal: Will remain free from falls  Description: Will remain free from falls  Outcome: Met This Shift  Note: Pt assessed as a fall risk this shift. Remains free from falls and accidental injury at this time. Fall precautions in place, including falling star sign and fall risk band on pt. Floor free from obstacles, and bed is locked and in lowest position. Adequate lighting provided. Pt encouraged to call before getting OOB for any need. Bed alarm activated. Will continue to monitor needs during hourly rounding, and reinforce education on use of call light. Problem: SAFETY  Goal: Free from accidental physical injury  Outcome: Met This Shift     Problem: DAILY CARE  Goal: Daily care needs are met  Outcome: Met This Shift  Note: Patient and staff currently meeting all patient's daily care needs. Patient encouraged to participate and complete all ADLs per self. Will continue to monitor for opportunities for patient to meet all ADLs per self. Problem: PAIN  Goal: Patient's pain/discomfort is manageable  Outcome: Ongoing  Note: Pt medicated with pain medication prn. Assessed all pain characteristics including level, type, location, frequency, and onset. Non-pharmacologic interventions offered to pt as well. Pt states pain is tolerable at this time. Will continue to monitor. Problem: SKIN INTEGRITY  Goal: Skin integrity is maintained or improved  Outcome: Ongoing  Note: Skin assessment performed. See head to toe assessment. Will continue to monitor.         Problem: Infection - Surgical Site:  Goal: Will show no infection signs and symptoms  Description: Will show no infection signs and symptoms  Outcome: Ongoing

## 2020-06-17 NOTE — PROGRESS NOTES
Pt arrived to floor via bed from OR. Vitals taken. Admission and assessment complete. No distress noted. See doc flowsheet and admission navigator for details. POC and education initiated and reviewed with patient. Call light within reach, and pt educated on its use. Bed in lowest position, and locked. Side rails up x 2. Denied further questions or needs at this time. Will continue to monitor.

## 2020-06-17 NOTE — BRIEF OP NOTE
Brief Postoperative Note      Patient: Rosie Croft  YOB: 1950  MRN: [id number]    Date of Procedure: 6/17/2020    Pre-Op Diagnosis: L5 chance fracture with grade 3 spondylolisthesis through fracture site post L4-5 PLIF    Post-Op Diagnosis: Same       Procedure(s):  L5-S1 PLIF L4-L5 REVISION post instrumented fusion    Surgeon(s):  Marlee Aelx MD    Assistant:  * No surgical staff found *    Anesthesia: General    Estimated Blood Loss (mL): 699    Complications: None    Specimens:   ID Type Source Tests Collected by Time Destination   A : REMOVAL OF HARDWARE Hardware Spine SURGICAL PATHOLOGY Marlee Alex MD 6/17/2020 1353        Implants:  Implant Name Type Inv.  Item Serial No.  Lot No. LRB No. Used Action   FREDDIE-GRAFT INFUSE KT MED Spine FREDDIE-GRAFT INFUSE KT MED  [employer] [place] [employer] [id number] N/A 1 Implanted   FREDDIE-GRAFT BONE CORTCL CANC 30CC - [id number] Spine FREDDIE-GRAFT BONE CORTCL CANC 30CC [id number] [employer] [id number] N/A 1 Implanted   CAP LK SPINAL STBL Spine CAP LK SPINAL STBL  [employer]  N/A 6 Implanted   IMPL SPINE OG REVERE CRV TI 5.5X75MM Spine IMPL SPINE OG REVERE CRV TI 5.5X75MM  [employer]  N/A 2 Implanted   SCREW SPINE PEDCL REVERE 6.5X40MM Spine SCREW SPINE PEDCL REVERE 6.5X40MM  [employer]  N/A 1 Implanted   SCREW SPINE PEDCL REVERE 6.5X50MM Spine SCREW SPINE PEDCL REVERE 6.5X50MM  [employer]  N/A 1 Implanted   SCREW BONE PLYAXL REV DUAL OUTR ANGELY 5.5X40MM Spine SCREW BONE PLYAXL REV DUAL OUTR ANGELY 5.5X40MM  [employer]  N/A 2 Implanted   IMPL SPINE CONNECT REVERS ADDTION OFFSET Spine IMPL SPINE CONNECT REVERS ADDTION OFFSET  [employer]  N/A 2 Implanted         Drains:   [REMOVED] Urethral Catheter Straight-tip (Removed)       Findings: see dictation    Electronically signed by Marlee Alex MD on 6/17/2020 at 2:55 PM

## 2020-06-18 LAB
GLUCOSE BLD-MCNC: 96 MG/DL (ref 65–105)
HCT VFR BLD CALC: 25.8 % (ref 36–46)
HEMOGLOBIN: 8.5 G/DL (ref 12–16)
MCH RBC QN AUTO: 31.7 PG (ref 26–34)
MCHC RBC AUTO-ENTMCNC: 32.8 G/DL (ref 31–37)
MCV RBC AUTO: 96.4 FL (ref 80–100)
NRBC AUTOMATED: ABNORMAL PER 100 WBC
PDW BLD-RTO: 16.5 % (ref 11.5–14.9)
PLATELET # BLD: 240 K/UL (ref 150–450)
PMV BLD AUTO: 7 FL (ref 6–12)
RBC # BLD: 2.67 M/UL (ref 4–5.2)
SURGICAL PATHOLOGY REPORT: NORMAL
WBC # BLD: 11.2 K/UL (ref 3.5–11)

## 2020-06-18 PROCEDURE — 85027 COMPLETE CBC AUTOMATED: CPT

## 2020-06-18 PROCEDURE — 6370000000 HC RX 637 (ALT 250 FOR IP): Performed by: STUDENT IN AN ORGANIZED HEALTH CARE EDUCATION/TRAINING PROGRAM

## 2020-06-18 PROCEDURE — 6360000002 HC RX W HCPCS: Performed by: ORTHOPAEDIC SURGERY

## 2020-06-18 PROCEDURE — 97530 THERAPEUTIC ACTIVITIES: CPT

## 2020-06-18 PROCEDURE — 1200000000 HC SEMI PRIVATE

## 2020-06-18 PROCEDURE — 97110 THERAPEUTIC EXERCISES: CPT

## 2020-06-18 PROCEDURE — 97116 GAIT TRAINING THERAPY: CPT

## 2020-06-18 PROCEDURE — 99024 POSTOP FOLLOW-UP VISIT: CPT | Performed by: ORTHOPAEDIC SURGERY

## 2020-06-18 PROCEDURE — 97166 OT EVAL MOD COMPLEX 45 MIN: CPT

## 2020-06-18 PROCEDURE — 97162 PT EVAL MOD COMPLEX 30 MIN: CPT

## 2020-06-18 PROCEDURE — 36415 COLL VENOUS BLD VENIPUNCTURE: CPT

## 2020-06-18 PROCEDURE — 99232 SBSQ HOSP IP/OBS MODERATE 35: CPT | Performed by: INTERNAL MEDICINE

## 2020-06-18 PROCEDURE — 6370000000 HC RX 637 (ALT 250 FOR IP): Performed by: ORTHOPAEDIC SURGERY

## 2020-06-18 PROCEDURE — 2580000003 HC RX 258: Performed by: ORTHOPAEDIC SURGERY

## 2020-06-18 PROCEDURE — 82947 ASSAY GLUCOSE BLOOD QUANT: CPT

## 2020-06-18 RX ORDER — ZOLPIDEM TARTRATE 5 MG/1
5 TABLET ORAL NIGHTLY PRN
Status: DISCONTINUED | OUTPATIENT
Start: 2020-06-18 | End: 2020-06-19 | Stop reason: HOSPADM

## 2020-06-18 RX ADMIN — ATORVASTATIN CALCIUM 40 MG: 40 TABLET, FILM COATED ORAL at 20:13

## 2020-06-18 RX ADMIN — CYCLOBENZAPRINE HYDROCHLORIDE 10 MG: 10 TABLET, FILM COATED ORAL at 12:17

## 2020-06-18 RX ADMIN — OXYCODONE HYDROCHLORIDE 10 MG: 10 TABLET ORAL at 22:49

## 2020-06-18 RX ADMIN — FERROUS SULFATE TAB 325 MG (65 MG ELEMENTAL FE) 325 MG: 325 (65 FE) TAB at 08:22

## 2020-06-18 RX ADMIN — PRAMIPEXOLE DIHYDROCHLORIDE 1.5 MG: 1.5 TABLET ORAL at 20:13

## 2020-06-18 RX ADMIN — PANTOPRAZOLE SODIUM 40 MG: 40 TABLET, DELAYED RELEASE ORAL at 16:40

## 2020-06-18 RX ADMIN — FUROSEMIDE 40 MG: 40 TABLET ORAL at 08:23

## 2020-06-18 RX ADMIN — FUROSEMIDE 40 MG: 40 TABLET ORAL at 16:40

## 2020-06-18 RX ADMIN — Medication 1 TABLET: at 08:22

## 2020-06-18 RX ADMIN — OXYCODONE HYDROCHLORIDE 10 MG: 10 TABLET ORAL at 18:35

## 2020-06-18 RX ADMIN — Medication 1 TABLET: at 20:13

## 2020-06-18 RX ADMIN — OXYCODONE HYDROCHLORIDE 10 MG: 10 TABLET ORAL at 14:28

## 2020-06-18 RX ADMIN — CARVEDILOL 6.25 MG: 6.25 TABLET, FILM COATED ORAL at 08:23

## 2020-06-18 RX ADMIN — LISINOPRIL 20 MG: 20 TABLET ORAL at 14:27

## 2020-06-18 RX ADMIN — OXYCODONE HYDROCHLORIDE 10 MG: 10 TABLET ORAL at 03:08

## 2020-06-18 RX ADMIN — CARVEDILOL 6.25 MG: 6.25 TABLET, FILM COATED ORAL at 20:13

## 2020-06-18 RX ADMIN — OXYCODONE HYDROCHLORIDE 10 MG: 10 TABLET ORAL at 08:40

## 2020-06-18 RX ADMIN — ZOLPIDEM TARTRATE 5 MG: 5 TABLET ORAL at 22:15

## 2020-06-18 RX ADMIN — Medication 10 ML: at 20:14

## 2020-06-18 RX ADMIN — Medication 10 ML: at 08:26

## 2020-06-18 RX ADMIN — SITAGLIPTIN 50 MG: 50 TABLET, FILM COATED ORAL at 08:27

## 2020-06-18 RX ADMIN — FENOFIBRATE 160 MG: 160 TABLET ORAL at 08:23

## 2020-06-18 RX ADMIN — CEFAZOLIN: 1 INJECTION, POWDER, FOR SOLUTION INTRAMUSCULAR; INTRAVENOUS at 05:15

## 2020-06-18 RX ADMIN — CITALOPRAM HYDROBROMIDE 10 MG: 20 TABLET ORAL at 08:23

## 2020-06-18 RX ADMIN — PANTOPRAZOLE SODIUM 40 MG: 40 TABLET, DELAYED RELEASE ORAL at 05:15

## 2020-06-18 ASSESSMENT — ENCOUNTER SYMPTOMS
NAUSEA: 0
VOMITING: 0
SHORTNESS OF BREATH: 0
ABDOMINAL PAIN: 0
COUGH: 0
DIARRHEA: 0
ANAL BLEEDING: 0
BACK PAIN: 1
WHEEZING: 0
CONSTIPATION: 0

## 2020-06-18 ASSESSMENT — PAIN DESCRIPTION - PROGRESSION
CLINICAL_PROGRESSION: NOT CHANGED
CLINICAL_PROGRESSION: GRADUALLY IMPROVING

## 2020-06-18 ASSESSMENT — PAIN DESCRIPTION - LOCATION
LOCATION: BACK

## 2020-06-18 ASSESSMENT — PAIN SCALES - GENERAL
PAINLEVEL_OUTOF10: 6
PAINLEVEL_OUTOF10: 4
PAINLEVEL_OUTOF10: 7
PAINLEVEL_OUTOF10: 6
PAINLEVEL_OUTOF10: 6
PAINLEVEL_OUTOF10: 8
PAINLEVEL_OUTOF10: 8
PAINLEVEL_OUTOF10: 7
PAINLEVEL_OUTOF10: 7
PAINLEVEL_OUTOF10: 6

## 2020-06-18 ASSESSMENT — PAIN DESCRIPTION - PAIN TYPE
TYPE: SURGICAL PAIN;CHRONIC PAIN
TYPE: SURGICAL PAIN

## 2020-06-18 ASSESSMENT — PAIN DESCRIPTION - DESCRIPTORS
DESCRIPTORS: THROBBING;PRESSURE
DESCRIPTORS: SHARP

## 2020-06-18 ASSESSMENT — PAIN DESCRIPTION - FREQUENCY
FREQUENCY: CONTINUOUS
FREQUENCY: CONTINUOUS

## 2020-06-18 ASSESSMENT — PAIN DESCRIPTION - ONSET
ONSET: ON-GOING
ONSET: ON-GOING

## 2020-06-18 ASSESSMENT — PAIN DESCRIPTION - ORIENTATION
ORIENTATION: LOWER

## 2020-06-18 NOTE — PLAN OF CARE
Problem: Falls - Risk of:  Goal: Will remain free from falls  Description: Will remain free from falls  6/18/2020 1606 by Francisca Krause RN  Outcome: Ongoing  Note: No falls this shift. Call light within reach and siderails x2. Bed in lowest position. Patient safety maintained. Problem: Falls - Risk of:  Goal: Absence of physical injury  Description: Absence of physical injury  Outcome: Ongoing  Note: No falls this shift. Call light within reach and siderails x2. Bed in lowest position. Patient safety maintained. Problem: SAFETY  Goal: Free from accidental physical injury  6/18/2020 1606 by Francisca Krause RN  Outcome: Ongoing  Note: No falls this shift. Call light within reach and siderails x2. Bed in lowest position. Patient safety maintained. Problem: SAFETY  Goal: Free from intentional harm  Outcome: Ongoing  Note: No falls this shift. Call light within reach and siderails x2. Bed in lowest position. Patient safety maintained. Problem: DAILY CARE  Goal: Daily care needs are met  6/18/2020 1606 by Francisca Krause RN  Outcome: Ongoing  Note: No falls this shift. Call light within reach and siderails x2. Bed in lowest position. Patient safety maintained. Problem: PAIN  Goal: Patient's pain/discomfort is manageable  6/18/2020 1606 by Francisca Krause RN  Outcome: Ongoing     Problem: SKIN INTEGRITY  Goal: Skin integrity is maintained or improved  6/18/2020 1606 by Francisca Krause RN  Outcome: Ongoing  Note: Skin assessment as charted. No new areas of breakdown. Problem: Infection - Surgical Site:  Goal: Will show no infection signs and symptoms  Description: Will show no infection signs and symptoms  6/18/2020 1606 by Francisca Krause RN  Outcome: Ongoing  Note: Patient remains free from infection and no signs and symptoms of infection.       Problem: Pain:  Goal: Pain level will decrease  Description: Pain level will decrease  6/18/2020 1606 by Francisca Krause RN  Outcome:

## 2020-06-18 NOTE — FLOWSHEET NOTE
06/18/20 7092   Encounter Summary   Services provided to: Patient not available  (patient receiving medical care)

## 2020-06-18 NOTE — OP NOTE
[address] [facility]                 [address] [facility], [address]                                OPERATIVE REPORT    PATIENT NAME: Lee Ann Ortiz                     :        1950  MED REC NO:   [id number]                              ROOM:       2056  ACCOUNT NO:   [de-identified]                           ADMIT DATE: 2020  PROVIDER:     Griselda Donoheu    DATE OF PROCEDURE:  2020    PREOPERATIVE DIAGNOSES:  The patient with low back pain, radicular leg  pain, history of L4-5 PLIF that was complicated by a Chance fracture in  the early postoperative period with grade 3 spondylolisthesis through  the fracture site at L5. POSTOPERATIVE DIAGNOSES:  The patient with low back pain, radicular leg  pain, history of L4-5 PLIF that was complicated by a Chance fracture in  the early postoperative period with grade 3 spondylolisthesis through  the fracture site at L5. PROCEDURE PERFORMED:  1. Removal of hardware deep. 2.  L4 to pelvis posterior segmental instrumentation, L5-S1 posterior  interbody fusion. 3.  Fluoroscopic assistance, allograft, autograft with revision L4-5  posterior fusion. OPERATING SURGEON:  Griselda Donohue MD    ASSISTANT:  None. ANESTHESIA:  General.    BLOOD LOSS:  500. COMPLICATIONS:  None. SPECIMEN:  1. L5 screws removed bilaterally. The L5 screw on the left as a result  of the Chance fracture, which extended through the pedicle had become  the inferior portion of the pedicle _____ and this was causing some  stenosis in the foraminal region. 2.  Posterior segment instrumentation with screws retained at L4, new  screws at S1 and pelvic screws in addition. 3.  Posterior lateral fusion accomplished with Infuse and local  autograft on the left, crushed cortical cancellous on the right. 4.  Posterior interbody fusion at L5-S1, as the patient had significant  changes in the vertebral body.   It was not felt that I could safely

## 2020-06-18 NOTE — PROGRESS NOTES
neurogenic claudication TECHNOLOGIST PROVIDED HISTORY: Reason for Exam: Spinal stenosis,Spondylosis,  Acquired spondylolisthesis ,Closed compression fracture of L5 lumbar vertebra with routine healing, Lumbar degenerative disc disease , Chronic left-sided low back pain with left-sided sciatica , Acuity: Chronic Type of Exam: Ongoing FINDINGS: BONES/ALIGNMENT:  There is redemonstration of posterior fixation at L4-5 stable alignment. There is artificial disc material in the L4-5 disc space. There is evidence of prior kyphoplasty at L4 and L5. The remaining lumbar vertebral body heights are maintained. There is stable anterolisthesis of L5 on S1. SOFT TISSUES: The posterior paraspinal soft tissues are unremarkable. The visualized abdominal structures are unremarkable. There is redemonstration of uncomplicated diverticulosis. There is no abnormal postcontrast enhancement. L1-L2: There is a circumferential disc bulge. There is no canal stenosis or foraminal narrowing. L2-L3: There is a circumferential disc bulge. There is no canal stenosis or foraminal narrowing. L3-L4: There is a circumferential disc bulge with facet hypertrophy. There is no definite canal stenosis or foraminal narrowing. L4-L5: There is artificial disc material.  There is no bony canal stenosis or left-sided bony foraminal narrowing. There may be a degree of right-sided bony foraminal narrowing. L5-S1: There is a circumferential disc bulge with facet hypertrophy. There is no significant bony canal stenosis. There may be a degree of bilateral bony foraminal narrowing. Posterior fixation at L4-5 with stable alignment. Stable remote L4 and L5 kyphoplasty. Multilevel degenerative disc disease and degenerative facet hypertrophy without definite canal stenosis. There may be a degree of right-sided bony foraminal narrowing at L4-5 and bilateral bony foraminal narrowing at L5-S1.      Fl Less Than 1 Hour    Result Date: 6/17/2020  Radiology exam

## 2020-06-18 NOTE — PROGRESS NOTES
Physical Therapy    Facility/Department: [facility]  Initial Assessment    NAME: Jorge Francis  : 1950  MRN: [id number]    Date of Service: 2020    Discharge Recommendations:  Patient would benefit from continued therapy after discharge   PT Equipment Recommendations  Equipment Needed: No    Assessment   Body structures, Functions, Activity limitations: Decreased functional mobility ; Decreased strength;Decreased endurance;Decreased safe awareness;Decreased balance; Increased pain  Assessment: continue per POC to maxmize potential for safe D/C  Treatment Diagnosis: impaired mobility S/P surgery  Specific instructions for Next Treatment: advance gait distance w/ RW as tolerated, instruct in exercise program and advance to 1 step (W/O rail) using AD  Prognosis: Good  Decision Making: Medium Complexity  History: admitted due to L5 fracture  Exam: ROM, MMT, balance and mobility assessment  Clinical Presentation: gait w/ w walker 25' w/ W/C follow w/ min x 2, FALL RISK, SBA for rolling to the right, min x 1 supine > sit, min x 2 sit <> stand   PT Education: Goals;PT Role;Plan of Care;Gait Training  Barriers to Learning: none  REQUIRES PT FOLLOW UP: Yes  Activity Tolerance  Activity Tolerance: Patient limited by fatigue;Patient limited by pain; Patient limited by endurance       Patient Diagnosis(es): There were no encounter diagnoses.      has a past medical history of Allergic rhinitis, cause unspecified, Back pain, Bowel obstruction (HCC), C. difficile diarrhea, CAD (coronary artery disease), Cellulitis, Cellulitis, Cerebral artery occlusion with cerebral infarction [facility]), Diverticulosis of colon (without mention of hemorrhage), GERD (gastroesophageal reflux disease), History of MI (myocardial infarction), History of ovarian cyst, History of peritonitis, HTN (hypertension), Hx of blood clots, Hyperlipidemia, Intestinal or peritoneal adhesions with obstruction (postoperative) (postinfection) ([facility] [address] Kidney with grade 3 spondylolisthesis through fracture site post L4-5 PLIF  Follows Commands: Within Functional Limits  Other (Comment): OK per nurse Mariella Houser to proceed w/ PT evaluation  General Comment  Comments: pt had L5-S1 PLIF L4-L5 REVISION post instrumented fusion by Dr. Soledad Snell on 6-. Subjective  Subjective: pt reports hx of 4 falls in the last month. Pain Screening  Patient Currently in Pain: Yes  Pain Assessment  Pain Assessment: 0-10  Pain Level: 4  Patient's Stated Pain Goal: No pain  Pain Type: Surgical pain  Pain Location: Back  Pain Orientation: Lower  Pain Radiating Towards: no radicular pain today  Pain Descriptors: Throbbing;Pressure  Pain Frequency: Continuous  Pain Onset: On-going  Clinical Progression: Gradually improving  Non-Pharmaceutical Pain Intervention(s): Repositioned; Ambulation/Increased Activity  Response to Pain Intervention: Patient Satisfied(had pain meds prior)  Multiple Pain Sites: No  Vital Signs  Patient Currently in Pain: Yes       Orientation  Orientation  Overall Orientation Status: Within Normal Limits(place: [facility], month: June, year: 2020, president: Luli Senior, birthdate: [birth year]-)  Social/Functional History  Social/Functional History  Lives With: Spouse  Type of Home: House(twinplex- states that they rent the other side out)  Home Layout: One level, Laundry in basement  Home Access: Stairs to enter without rails  Entrance Stairs - Number of Steps: 1 step w/o rail to enter, 12 steps w/ left rail to basement laundry  Entrance Stairs - Rails: None  Bathroom Shower/Tub: Tub/Shower unit, Doors, Shower chair with back  [employer]: Standard(has toilet raiser w/ grab bars on it)  Bathroom Equipment: Toilet raiser, Grab bars around toilet, Shower chair, Hand-held shower, Grab bars in [address]: Cane, Rolling walker, [address] bed, Lift chair(rollator, states the reacher is broken, aurora handled sponge for bathing)  Receives Help From: Family  ADL

## 2020-06-18 NOTE — DISCHARGE INSTR - COC
SURGICAL HISTORY  8/14/14    FESS    OVARY REMOVAL  1970    UNILATERAL due to cyst    OVARY REMOVAL  1971    partial, due to cyst    SINUS SURGERY  2004    UPPER GASTROINTESTINAL ENDOSCOPY N/A 5/31/2019    EGD ESOPHAGOGASTRODUODENOSCOPY performed by Abena Schmitz MD at [address] [facility] N/A 8/5/2019    EGD BIOPSY performed by Francheska Agustin MD at [address] [facility] N/A 8/23/2019    EGD BIOPSY performed by Abena Schmitz MD at [address] [facility] Left 3/5/2019    WRIST OPEN REDUCTION INTERNAL FIXATION performed by José Luis Hidalgo MD at [address]       Immunization History:   Immunization History   Administered Date(s) Administered    Influenza, High Dose (Fluzone 65 yrs and older) 11/17/2017    Pneumococcal Conjugate 13-valent (Debbie Masoud) 05/23/2017    Pneumococcal Polysaccharide ([id number]) 05/10/2016       Active Problems:  Patient Active Problem List   Diagnosis Code    Other specified disorders of rotator cuff syndrome of shoulder and allied disorders M75.100    Diverticulosis of large intestine K57.30    Intestinal or peritoneal adhesions with obstruction (postoperative) (postinfection) ([facility] [address] K56.50    Restless legs syndrome (RLS) G25.81    GERD (gastroesophageal reflux disease) K21.9    Essential hypertension I10    Mixed hyperlipidemia E78.2    Other abnormal glucose R73.09    Atherosclerosis I70.90    Lateral epicondylitis M77.10    Allergic rhinitis J30.9    Vitamin D deficiency E55.9    Depression F32.9    Degenerative joint disease (DJD) of hip M16.9    Peripheral edema R60.9    Injury of foot, left E66.482Z    Facial droop R29.810    CVA (cerebral vascular accident) ([facility] [address] I63.9    Bradycardia R00.1    Ataxia R27.0    Aphasia R47.01    TIA (transient ischemic attack) G45.9    Need for prophylactic vaccination and inoculation against cholera alone Z23    Chest pain R07.9    Osteopenia M85.80    Lumbago M54.5    Facet arthritis of lumbar region M47.816    Meralgia paresthetica of right side G57.11    Lumbar degenerative disc disease M51.36    Spondylosis of lumbar region without myelopathy or radiculopathy M47.816    Blood poisoning [id number]    Cellulitis of left lower extremity L03. 80    Encounter for medication monitoring Z51.81    Deep vein thrombosis (DVT) of right lower extremity (HCC) I82.401    Lumbar facet arthropathy M47.816    Shortness of breath R06.02    Chronic deep vein thrombosis (DVT) of proximal vein of both lower extremities (HCC) I82.5Y3    Chronic diastolic heart failure ([facility]) I50.32    Neurogenic claudication due to lumbar spinal stenosis M48.062    Sacroiliitis ([facility]) M46.1    Stage 3 chronic kidney disease (HCC) N18.3    Type 2 diabetes mellitus with circulatory disorder, without long-term current use of insulin (HCC) E11.59    Chronic deep vein thrombosis (DVT) of popliteal vein of right lower extremity (HCC) I82.531    Closed fracture of left wrist S62.102A    B12 deficiency E53.8    Iron deficiency anemia secondary to inadequate dietary iron intake D50.8    Diarrhea due to malabsorption K90.9, R19.7    Closed head injury S09.90XA    Scalp laceration S01. 01XA    Abnormal finding on imaging R93.89    Other irritable bowel syndrome K58.8    Frequent falls R29.6    Double vision H53.2    Muscle soreness M79.10    GI bleed K92.2    Peptic ulcer K27.9    Melena K92.1    PUD (peptic ulcer disease) K27.9    Absolute anemia D64.9    Acute blood loss anemia D62    Acute renal failure (HCC) N17.9    Clostridium difficile infection A49.8    Acquired spondylolisthesis M43.10    Spinal stenosis of lumbar region with neurogenic claudication M48.062    Acute deep vein thrombosis (DVT) of proximal vein of left lower extremity ([facility]) I82.4Y2    Leg swelling M79.89    Back pain M54.9    Neurological disorder G98.8 No ventilator support    Rehab Therapies: Physical Therapy and Occupational Therapy  Weight Bearing Status/Restrictions: No weight bearing restirctions  Other Medical Equipment (for information only, NOT a DME order):  cane and walker  Other Treatments: Skilled Nursing Assessment; Medication Education and Monitor    Home health care agency's  to evaluate patient two weeks prior to discharge from home health to determine post-discharge services. Patient's personal belongings (please select all that are sent with patient):  Glasses    RN SIGNATURE:  Electronically signed by Catherine Fisher RN on 6/19/20 at 11:18 AM EDT    CASE MANAGEMENT/SOCIAL WORK SECTION    Inpatient Status Date: ***    Readmission Risk Assessment Score:  Readmission Risk              Risk of Unplanned Readmission:        17           Discharging to Facility/ . [address] [place] [address]  Phone [phone]  Fax [phone]  Evening fax [phone]   Phone: [phone]  Fax [phone]      / signature: Electronically signed by Inderjit Mcpherson RN on 6/18/20 at 10:48 AM EDT    PHYSICIAN SECTION    Prognosis: Good    Condition at Discharge: Stable    Rehab Potential (if transferring to Rehab): Good    Recommended Labs or Other Treatments After Discharge: na    Physician Certification: I certify the above information and transfer of Johnnie Schneider  is necessary for the continuing treatment of the diagnosis listed and that she requires [facility] for less 30 days.      Update Admission H&P: No change in H&P    PHYSICIAN SIGNATURE:  Electronically signed by Irineo Perez MD on 6/18/20 at 12:46 PM EDT

## 2020-06-18 NOTE — CARE COORDINATION
CASE MANAGEMENT NOTE:    Admission Date:  6/17/2020 Vi Payne is a 72 y.o.  female    Admitted for : Back pain [M54.9]    Met with:  Patient    PCP:  Dr Alonzo Manzano:  Michelle Landyr:  independently at home             Current Services PTA:  No    Is patient agreeable to VNS: Yes    Freedom of choice provided:  Yes    List of [address] provided: NA- Was just discharged from [address] [place] [address] and would like again    VNS chosen:  Yes    DME:  cane, walker, shower chair, grab bars and glucometer     Home Oxygen: No    Nebulizer: No    CPAP/BIPAP: No    Supplier: N/A    Potential Assistance Needed: Yes    SNF needed: No    Freedom of choice and list provided: NA    Pharmacy:  [facility] [place]        Does Patient want to use MEDS to BEDS? No    Is the Patient an [facility] with Readmission Risk Score greater than 14%? Yes  If yes, pt needs a follow up appointment made within 7 days. Family Members/Caregivers that pt would like involved in their care:    Yes    If yes, list name here:  Spouse True    Transportation Provider:  Family             Is patient in Isolation/One on One/Altered Mental Status? No  If yes, skip next question. If no, would they like an I-Pad to  use? No  If yes, call [phone]. Discharge Plan:  6/18/2020 [facility]; From apartment with 2 steps in with spouse who is home all the time- Stated she is independent and drives; DME- cane, walker, shower chair, grab bars and glucometer; Agreeable to VNS [address] aware; POD #1 L5-S1 PLIF L4-L5 REVISION post instrumented fusion; PT/OT ordered; [address] 17%; ANNALEE NEEDS SIGNED/COMPLETED//JUDY                  Electronically signed by:  Jamilah Corral RN on 6/18/2020 at 10:41 AM

## 2020-06-18 NOTE — PROGRESS NOTES
[address]   Occupational Therapy Evaluation  Date: 20  Patient Name: Radha Lares       Room: 3042/0694-08  MRN: [id number]  Account: [de-identified]   : 1950  (76 y.o.) Gender: female     Discharge Recommendations:  Further Occupational  Therapy is recommended upon facility discharge. OT Equipment Recommendations  Equipment Needed: Yes  Mobility Devices: ADL Assistive Devices  ADL Assistive Devices: Sachin Hwang    Referring Practitioner: Dr Ninfa Ley   Diagnosis: s/p L5-S1 PLIF L4-L5 REVISION post instrumented fusion  Additional Pertinent Hx: L4-5 PLIF 2020 - Reports 4 falls at home since she has been home     Treatment Diagnosis: Imparied ability to care for self related to back problems   Past Medical History:  has a past medical history of Allergic rhinitis, cause unspecified, Back pain, Bowel obstruction (HCC), C. difficile diarrhea, CAD (coronary artery disease), Cellulitis, Cellulitis, Cerebral artery occlusion with cerebral infarction [facility]), Diverticulosis of colon (without mention of hemorrhage), GERD (gastroesophageal reflux disease), History of MI (myocardial infarction), History of ovarian cyst, History of peritonitis, HTN (hypertension), Hx of blood clots, Hyperlipidemia, Intestinal or peritoneal adhesions with obstruction (postoperative) (postinfection) ([address] Kidney infection, Lateral epicondylitis  of elbow, MDRO (multiple drug resistant organisms) resistance, Muscle strain, Other abnormal glucose, PONV (postoperative nausea and vomiting), Pre-diabetes, Restless legs syndrome (RLS), TIA (transient ischemic attack), Vitamin D deficiency, and Wears glasses. Past Surgical History:   has a past surgical history that includes Ovary removal (); colectomy (); Appendectomy (); Ovary removal (); Hysterectomy (); Bunionectomy (Left); sinus surgery (); Colonoscopy; other surgical history (14); cyst removal (Right);  Wrist fracture AROM (degrees)  RUE AROM : WFL     Right Hand AROM (degrees)  Right Hand AROM: WFL    Fine Motor Skills  Coordination  Movements Are Fluid And Coordinated: No  Coordination and Movement description: Gross motor impairments, Right UE, Left UE                           Mobility    Dependent                             Assessment  Performance deficits / Impairments: Decreased functional mobility , Decreased safe awareness, Decreased posture, Decreased ADL status, Decreased endurance, Decreased strength  Treatment Diagnosis: Imparied ability to care for self related to back problems   Prognosis: Fair, Good  Decision Making: Medium Complexity  History:  Moderate chart review including prior admissions   Exam: 6 area of impaired performance   REQUIRES OT FOLLOW UP: Yes  Discharge Recommendations: Patient would benefit from continued therapy after discharge  Activity Tolerance: Patient limited by pain, Patient limited by fatigue         Functional Outcome Measures  AM-PAC Daily Activity Inpatient   How much help for putting on and taking off regular lower body clothing?: Total  How much help for Bathing?: A Lot  How much help for Toileting?: Total  How much help for putting on and taking off regular upper body clothing?: A Lot  How much help for taking care of personal grooming?: A Little  How much help for eating meals?: A Little  AM-[facility] Inpatient Daily Activity Raw Score: 12  AM-PAC Inpatient ADL T-Scale Score : 30.6  ADL Inpatient CMS 0-100% Score: 66.57  ADL Inpatient CMS G-Code Modifier : CL       Goals  Patient Goals   Patient goals : Be able to walk again   Short term goals  Time Frame for Short term goals: 2-5 days  Short term goal 1: Tolerate 10-25 minutes of functional UB activity without increased fatigue or pain to support ADL performance  Short term goal 2: D/V understanding of Back Program and modified care strategies with application to care needs   Short term goal 3: D/V understanding of Home Safety and Fall

## 2020-06-19 VITALS
WEIGHT: 146 LBS | HEART RATE: 83 BPM | BODY MASS INDEX: 26.87 KG/M2 | HEIGHT: 62 IN | OXYGEN SATURATION: 98 % | SYSTOLIC BLOOD PRESSURE: 110 MMHG | TEMPERATURE: 98.2 F | DIASTOLIC BLOOD PRESSURE: 41 MMHG | RESPIRATION RATE: 16 BRPM

## 2020-06-19 PROBLEM — S32.052D: Status: ACTIVE | Noted: 2020-06-19

## 2020-06-19 LAB
GLUCOSE BLD-MCNC: 113 MG/DL (ref 65–105)
HCT VFR BLD CALC: 24 % (ref 36–46)
HEMOGLOBIN: 8 G/DL (ref 12–16)
MCH RBC QN AUTO: 31.6 PG (ref 26–34)
MCHC RBC AUTO-ENTMCNC: 33.2 G/DL (ref 31–37)
MCV RBC AUTO: 95.1 FL (ref 80–100)
NRBC AUTOMATED: ABNORMAL PER 100 WBC
PDW BLD-RTO: 16.1 % (ref 11.5–14.9)
PLATELET # BLD: 218 K/UL (ref 150–450)
PMV BLD AUTO: 7 FL (ref 6–12)
RBC # BLD: 2.53 M/UL (ref 4–5.2)
WBC # BLD: 11.6 K/UL (ref 3.5–11)

## 2020-06-19 PROCEDURE — 97535 SELF CARE MNGMENT TRAINING: CPT

## 2020-06-19 PROCEDURE — 6370000000 HC RX 637 (ALT 250 FOR IP): Performed by: ORTHOPAEDIC SURGERY

## 2020-06-19 PROCEDURE — 2580000003 HC RX 258: Performed by: ORTHOPAEDIC SURGERY

## 2020-06-19 PROCEDURE — 36415 COLL VENOUS BLD VENIPUNCTURE: CPT

## 2020-06-19 PROCEDURE — 85027 COMPLETE CBC AUTOMATED: CPT

## 2020-06-19 PROCEDURE — 82947 ASSAY GLUCOSE BLOOD QUANT: CPT

## 2020-06-19 PROCEDURE — 99024 POSTOP FOLLOW-UP VISIT: CPT | Performed by: ORTHOPAEDIC SURGERY

## 2020-06-19 PROCEDURE — 97530 THERAPEUTIC ACTIVITIES: CPT

## 2020-06-19 RX ORDER — RIVAROXABAN 10 MG/1
10 TABLET, FILM COATED ORAL
Qty: 30 TABLET | Refills: 1 | Status: SHIPPED | OUTPATIENT
Start: 2020-06-22 | End: 2020-07-30 | Stop reason: ALTCHOICE

## 2020-06-19 RX ORDER — HYDROCODONE BITARTRATE AND ACETAMINOPHEN 5; 325 MG/1; MG/1
1 TABLET ORAL EVERY 6 HOURS PRN
Qty: 28 TABLET | Refills: 0 | Status: SHIPPED | OUTPATIENT
Start: 2020-06-19 | End: 2020-07-16 | Stop reason: SDUPTHER

## 2020-06-19 RX ADMIN — Medication 1 TABLET: at 08:45

## 2020-06-19 RX ADMIN — FENOFIBRATE 160 MG: 160 TABLET ORAL at 08:45

## 2020-06-19 RX ADMIN — FUROSEMIDE 40 MG: 40 TABLET ORAL at 08:45

## 2020-06-19 RX ADMIN — FERROUS SULFATE TAB 325 MG (65 MG ELEMENTAL FE) 325 MG: 325 (65 FE) TAB at 08:45

## 2020-06-19 RX ADMIN — CARVEDILOL 6.25 MG: 6.25 TABLET, FILM COATED ORAL at 08:45

## 2020-06-19 RX ADMIN — Medication 10 ML: at 08:47

## 2020-06-19 RX ADMIN — CITALOPRAM HYDROBROMIDE 10 MG: 20 TABLET ORAL at 08:44

## 2020-06-19 RX ADMIN — OXYCODONE HYDROCHLORIDE 10 MG: 10 TABLET ORAL at 14:14

## 2020-06-19 RX ADMIN — SITAGLIPTIN 50 MG: 50 TABLET, FILM COATED ORAL at 08:47

## 2020-06-19 RX ADMIN — PANTOPRAZOLE SODIUM 40 MG: 40 TABLET, DELAYED RELEASE ORAL at 05:48

## 2020-06-19 ASSESSMENT — PAIN SCALES - GENERAL
PAINLEVEL_OUTOF10: 5
PAINLEVEL_OUTOF10: 9

## 2020-06-19 ASSESSMENT — PAIN DESCRIPTION - PAIN TYPE
TYPE: SURGICAL PAIN
TYPE: SURGICAL PAIN

## 2020-06-19 ASSESSMENT — PAIN DESCRIPTION - LOCATION
LOCATION: BACK
LOCATION: BACK

## 2020-06-19 ASSESSMENT — PAIN DESCRIPTION - PROGRESSION: CLINICAL_PROGRESSION: NOT CHANGED

## 2020-06-19 ASSESSMENT — PAIN DESCRIPTION - ORIENTATION: ORIENTATION: LOWER

## 2020-06-19 NOTE — PROGRESS NOTES
based on clinical reasoning unless otherwise noted. pt demo difficulty staying awake req VCs to continue activity. pt limited this date 2* fatigue. Assessment  Performance deficits / Impairments: Decreased functional mobility ; Decreased safe awareness;Decreased posture;Decreased ADL status; Decreased endurance;Decreased strength  Prognosis: Fair;Good  Discharge Recommendations: Patient would benefit from continued therapy after discharge  Activity Tolerance: Patient limited by fatigue(pt demo difficulty staying awake this date)  Safety Devices in place: Yes  Type of devices: Call light within reach; Left in bed;Nurse notified             Patient Education:  Patient Education: OT POC, back precautions, log rolling tech, breathing tech for pain mgt PRN  Learner:patient  Method: demonstration and explanation       Outcome: acknowledged understanding, demo understanding     Plan  Safety Devices  Safety Devices in place: Yes  Type of devices: Call light within reach, Left in bed, Nurse notified  Plan  Times per week: 2-5 sessions   Times per day: Daily  Current Treatment Recommendations: Strengthening, Endurance Training, Patient/Caregiver Education & Training, Self-Care / ADL, Positioning, Safety Education & Training, Functional Mobility Training      Goals  Short term goals  Time Frame for Short term goals: 2-5 days  Short term goal 1: Tolerate 10-25 minutes of functional UB activity without increased fatigue or pain to support ADL performance  Short term goal 2: D/V understanding of Back Program and modified care strategies with application to care needs   Short term goal 3: D/V understanding of Home Safety and Fall Prevention strategies   Short term goal 4: Participate in seated care tasks using safe techniques for self care and mobility     OT Individual Minutes  Time In: 1053  Time Out: 6198 [facility]  Minutes: 24      Electronically signed by SHWETA Ordonez on 6/19/20 at 12:36 PM EDT

## 2020-06-19 NOTE — CONSULTS
Physical Medicine & Rehabilitation    CHART REVIEW      Admitting Physician: Amor Myrick MD    Primary Care Provider: Kolby Prieto MD     Reason for Consult:  Acute Inpatient Rehabilitation    Chief Complaint: Back pain, lower extremity weakness    History of Present Illness:  Referring Provider is requesting an evaluation for appropriate placement upon discharge from acute care. Ms. Martin Downing is a 72 y.o. female who was admitted to [facility] on 6/17/2020 with No chief complaint on file. 60-year-old female with history of VT right lower extremity, artery disease and status post L4-5 posterior fusion and vertebroplasty on February 10, 2020. And a fall few weeks after the surgery-noted low back pain radiating to buttocks and left lower extremity. Still with weakness in lower extremities with recurrent falls. No incontinence of bowel bladder no lower extremity numbness. CT showed lucency along L4 and L5 screws suggesting instrumentation loosening    Orthopedics- underwent removal of deep hardware, L5-S1 posterior interbody fusion and  revision of L4-5 posterior fusion. Per orthopedics notes improving left radicular leg pain and back pain    Internal medicine- hypertension controlled, diabetes controlled, diastolic CHF without exacerbation         Functional History:  PTA: Independent with all activities.     Current:  PT:  Restrictions/Precautions: Fall Risk(peripheral IV right antecubital)  Implants present? : Metal implants(ORIF right wrist, back surgery)  Other position/activity restrictions: Revision back surgery    Transfers  Sit to Stand: Minimal Assistance  Stand to sit: Moderate Assistance  Stand Pivot Transfers: Minimal Assistance(used w walker)  Ambulation 1  Surface: level tile  Device: Rolling Walker  Other Apparatus: Wheelchair follow  Assistance: Minimal assistance  Gait Deviations: Slow Alicia, Decreased step length, Decreased step height, Shuffles  Distance: 15' to doorway of the cleared by surgery, monitor closely for DVT     Please call with questions. Jennifer Osborne. Mamadou Wilkins MD          This note is created with the assistance of a speech recognition program.  While intending to generate a document that actually reflects the content of the visit, the document can still have some errors including those of syntax and sound a like substitutions which may escape proof reading.   In such instances, actual meaning can be extrapolated by contextual diversion

## 2020-06-19 NOTE — PROGRESS NOTES
I sent a perfect serve message to Lissett Greenberg with rehab at 7:07 am on 6/19/2020. [address] [facility]

## 2020-06-23 ENCOUNTER — HOSPITAL ENCOUNTER (OUTPATIENT)
Age: 70
Setting detail: SPECIMEN
Discharge: HOME | End: 2020-06-23
Payer: MEDICARE

## 2020-06-23 LAB
ANION GAP SERPL CALCULATED.3IONS-SCNC: 18 MMOL/L (ref 9–17)
BUN BLDV-MCNC: 25 MG/DL (ref 8–23)
BUN/CREAT BLD: ABNORMAL (ref 9–20)
CALCIUM SERPL-MCNC: 9.1 MG/DL (ref 8.6–10.4)
CHLORIDE BLD-SCNC: 93 MMOL/L (ref 98–107)
CO2: 31 MMOL/L (ref 20–31)
CREAT SERPL-MCNC: 0.98 MG/DL (ref 0.5–0.9)
GFR AFRICAN AMERICAN: >60 ML/MIN
GFR NON-AFRICAN AMERICAN: 56 ML/MIN
GFR SERPL CREATININE-BSD FRML MDRD: ABNORMAL ML/MIN/{1.73_M2}
GFR SERPL CREATININE-BSD FRML MDRD: ABNORMAL ML/MIN/{1.73_M2}
GLUCOSE BLD-MCNC: 108 MG/DL (ref 65–105)
GLUCOSE BLD-MCNC: 117 MG/DL (ref 65–105)
GLUCOSE BLD-MCNC: 119 MG/DL (ref 65–105)
GLUCOSE BLD-MCNC: 136 MG/DL (ref 70–99)
HCT VFR BLD CALC: 26.5 % (ref 36.3–47.1)
HEMOGLOBIN: 8.6 G/DL (ref 11.9–15.1)
MCH RBC QN AUTO: 31.4 PG (ref 25.2–33.5)
MCHC RBC AUTO-ENTMCNC: 32.5 G/DL (ref 28.4–34.8)
MCV RBC AUTO: 96.7 FL (ref 82.6–102.9)
NRBC AUTOMATED: 0 PER 100 WBC
PDW BLD-RTO: 14.6 % (ref 11.8–14.4)
PLATELET # BLD: 381 K/UL (ref 138–453)
PMV BLD AUTO: 10.1 FL (ref 8.1–13.5)
POTASSIUM SERPL-SCNC: 3.2 MMOL/L (ref 3.7–5.3)
RBC # BLD: 2.74 M/UL (ref 3.95–5.11)
SODIUM BLD-SCNC: 142 MMOL/L (ref 135–144)
WBC # BLD: 7.5 K/UL (ref 3.5–11.3)

## 2020-06-23 PROCEDURE — 85027 COMPLETE CBC AUTOMATED: CPT

## 2020-06-23 PROCEDURE — 36415 COLL VENOUS BLD VENIPUNCTURE: CPT

## 2020-06-23 PROCEDURE — 80048 BASIC METABOLIC PNL TOTAL CA: CPT

## 2020-06-23 PROCEDURE — P9603 ONE-WAY ALLOW PRORATED MILES: HCPCS

## 2020-06-25 ENCOUNTER — HOSPITAL ENCOUNTER (OUTPATIENT)
Age: 70
Setting detail: SPECIMEN
Discharge: HOME | End: 2020-06-25
Payer: MEDICARE

## 2020-06-25 LAB
ANION GAP SERPL CALCULATED.3IONS-SCNC: 17 MMOL/L (ref 9–17)
BUN BLDV-MCNC: 26 MG/DL (ref 8–23)
BUN/CREAT BLD: ABNORMAL (ref 9–20)
CALCIUM SERPL-MCNC: 9.2 MG/DL (ref 8.6–10.4)
CHLORIDE BLD-SCNC: 91 MMOL/L (ref 98–107)
CO2: 29 MMOL/L (ref 20–31)
CREAT SERPL-MCNC: 1.04 MG/DL (ref 0.5–0.9)
GFR AFRICAN AMERICAN: >60 ML/MIN
GFR NON-AFRICAN AMERICAN: 53 ML/MIN
GFR SERPL CREATININE-BSD FRML MDRD: ABNORMAL ML/MIN/{1.73_M2}
GFR SERPL CREATININE-BSD FRML MDRD: ABNORMAL ML/MIN/{1.73_M2}
GLUCOSE BLD-MCNC: 162 MG/DL (ref 70–99)
POTASSIUM SERPL-SCNC: 3.9 MMOL/L (ref 3.7–5.3)
SODIUM BLD-SCNC: 137 MMOL/L (ref 135–144)

## 2020-06-25 PROCEDURE — 36415 COLL VENOUS BLD VENIPUNCTURE: CPT

## 2020-06-25 PROCEDURE — P9603 ONE-WAY ALLOW PRORATED MILES: HCPCS

## 2020-06-25 PROCEDURE — 80048 BASIC METABOLIC PNL TOTAL CA: CPT

## 2020-06-29 ENCOUNTER — TELEPHONE (OUTPATIENT)
Dept: ORTHOPEDIC SURGERY | Age: 70
End: 2020-06-29

## 2020-07-01 ENCOUNTER — TELEPHONE (OUTPATIENT)
Dept: GASTROENTEROLOGY | Age: 70
End: 2020-07-01

## 2020-07-02 ENCOUNTER — CARE COORDINATION (OUTPATIENT)
Dept: CASE MANAGEMENT | Age: 70
End: 2020-07-02

## 2020-07-02 ENCOUNTER — OFFICE VISIT (OUTPATIENT)
Dept: ORTHOPEDIC SURGERY | Age: 70
End: 2020-07-02

## 2020-07-02 ENCOUNTER — OFFICE VISIT (OUTPATIENT)
Dept: GASTROENTEROLOGY | Age: 70
End: 2020-07-02
Payer: MEDICARE

## 2020-07-02 VITALS — TEMPERATURE: 97.2 F

## 2020-07-02 VITALS — BODY MASS INDEX: 26.7 KG/M2 | RESPIRATION RATE: 18 BRPM | HEIGHT: 62 IN | TEMPERATURE: 97.9 F

## 2020-07-02 PROBLEM — K59.01 SLOW TRANSIT CONSTIPATION: Status: ACTIVE | Noted: 2020-07-02

## 2020-07-02 PROCEDURE — G8417 CALC BMI ABV UP PARAM F/U: HCPCS | Performed by: INTERNAL MEDICINE

## 2020-07-02 PROCEDURE — 1111F DSCHRG MED/CURRENT MED MERGE: CPT | Performed by: INTERNAL MEDICINE

## 2020-07-02 PROCEDURE — G8427 DOCREV CUR MEDS BY ELIG CLIN: HCPCS | Performed by: INTERNAL MEDICINE

## 2020-07-02 PROCEDURE — 1036F TOBACCO NON-USER: CPT | Performed by: INTERNAL MEDICINE

## 2020-07-02 PROCEDURE — 99024 POSTOP FOLLOW-UP VISIT: CPT | Performed by: ORTHOPAEDIC SURGERY

## 2020-07-02 PROCEDURE — 4040F PNEUMOC VAC/ADMIN/RCVD: CPT | Performed by: INTERNAL MEDICINE

## 2020-07-02 PROCEDURE — 1123F ACP DISCUSS/DSCN MKR DOCD: CPT | Performed by: INTERNAL MEDICINE

## 2020-07-02 PROCEDURE — 99214 OFFICE O/P EST MOD 30 MIN: CPT | Performed by: INTERNAL MEDICINE

## 2020-07-02 PROCEDURE — 3017F COLORECTAL CA SCREEN DOC REV: CPT | Performed by: INTERNAL MEDICINE

## 2020-07-02 PROCEDURE — G8399 PT W/DXA RESULTS DOCUMENT: HCPCS | Performed by: INTERNAL MEDICINE

## 2020-07-02 PROCEDURE — 1090F PRES/ABSN URINE INCON ASSESS: CPT | Performed by: INTERNAL MEDICINE

## 2020-07-02 RX ORDER — FERROUS SULFATE 325(65) MG
325 TABLET ORAL 2 TIMES DAILY
Qty: 90 TABLET | Refills: 2 | Status: SHIPPED | OUTPATIENT
Start: 2020-07-02 | End: 2022-09-06 | Stop reason: DRUGHIGH

## 2020-07-02 ASSESSMENT — ENCOUNTER SYMPTOMS
COUGH: 0
EYES NEGATIVE: 1
TROUBLE SWALLOWING: 0
CHOKING: 0
VOMITING: 0
CONSTIPATION: 1
BACK PAIN: 1
BLOOD IN STOOL: 0
NAUSEA: 0
WHEEZING: 0
ABDOMINAL DISTENTION: 0
DIARRHEA: 0
VOICE CHANGE: 0
ANAL BLEEDING: 0
RECTAL PAIN: 0
SORE THROAT: 0
ALLERGIC/IMMUNOLOGIC NEGATIVE: 1
ABDOMINAL PAIN: 0
RESPIRATORY NEGATIVE: 1
SINUS PRESSURE: 0

## 2020-07-02 NOTE — CARE COORDINATION
[facility] Transitions Initial Follow Up Call    Call within 2 business days of discharge: Yes    Patient: Won Moon Patient : 1950   MRN: [id number]  Reason for Admission: l5-s1 PLIF  Discharge Date: 20 RARS: Readmission Risk Score: 19      Last Discharge [facility]       Complaint Diagnosis Description Type Department Provider    20  Closed unstable burst fracture of fifth lumbar vertebra with routine healing Admission (Discharged) [facility] Arlene Chopra MD      [facility] compliant. Pt home with home care. Per MD note pt doing better than expected. L leg numbness has resolved and no leg pain since RLIF. First attempt to call pt.    Nat Chan -[phone]    Spoke with: [facility]    Facility: [facility]     Non-face-to-face services provided:      Care Transitions 24 Hour Call    Do you have all of your prescriptions and are they filled?:   (Comment: waiting for a couple from her mail order pharmacy)  [address] (Comment: [facility])  Patient DME:  Ayla Gillette, Other  Other Patient DME:  grab bars  Do you have support at home?:  Partner/Spouse/SO  Are you an active caregiver in your home?:  No  Care Transitions Interventions     Other Services:   (Comment: set up vns/OL)          Follow Up  Future Appointments   Date Time Provider Britni Marshall   2020  4:00 PM Michelle Martins MD [address] [place] [address]   2020  1:40 PM Arlene Chopra MD [facility] Selin Norton RN

## 2020-07-02 NOTE — PROGRESS NOTES
rectal pain and vomiting. Denies   Endocrine: Negative. Genitourinary: Negative. Negative for difficulty urinating. Musculoskeletal: Positive for back pain. Negative for arthralgias, gait problem and myalgias. Skin: Negative. Allergic/Immunologic: Negative. Negative for environmental allergies and food allergies. Neurological: Positive for weakness. Negative for dizziness, light-headedness, numbness and headaches. Hematological: Negative. Does not bruise/bleed easily. Psychiatric/Behavioral: Negative. Negative for sleep disturbance. The patient is not nervous/anxious. Reviewed and agree  Objective:   Physical Exam  Nursing note reviewed. Constitutional:       Appearance: She is well-developed. Comments: Anxious    HENT:      Head: Normocephalic and atraumatic. Eyes:      Conjunctiva/sclera: Conjunctivae normal.      Pupils: Pupils are equal, round, and reactive to light. Neck:      Musculoskeletal: Normal range of motion and neck supple. Cardiovascular:      Heart sounds: Normal heart sounds. Pulmonary:      Effort: Pulmonary effort is normal.      Breath sounds: Normal breath sounds. Abdominal:      General: Bowel sounds are normal.      Palpations: Abdomen is soft. Musculoskeletal: Normal range of motion. Skin:     General: Skin is warm. Neurological:      Mental Status: She is alert and oriented to person, place, and time.    Psychiatric:         Behavior: Behavior normal.         Assessment:      Patient Active Problem List   Diagnosis    Other specified disorders of rotator cuff syndrome of shoulder and allied disorders    Diverticulosis of large intestine    Intestinal or peritoneal adhesions with obstruction (postoperative) (postinfection) (HCC)    Restless legs syndrome (RLS)    GERD (gastroesophageal reflux disease)    Essential hypertension    Mixed hyperlipidemia    Other abnormal glucose    Atherosclerosis    Lateral epicondylitis    Allergic rhinitis    Vitamin D deficiency    Depression    Degenerative joint disease (DJD) of hip    Peripheral edema    Injury of foot, left    Facial droop    CVA (cerebral vascular accident) ([address]    Bradycardia    Ataxia    Aphasia    TIA (transient ischemic attack)    Need for prophylactic vaccination and inoculation against cholera alone    Chest pain    Osteopenia    Lumbago    Facet arthritis of lumbar region    Meralgia paresthetica of right side    Lumbar degenerative disc disease    Spondylosis of lumbar region without myelopathy or radiculopathy    Blood poisoning    Cellulitis of left lower extremity    Encounter for medication monitoring    Deep vein thrombosis (DVT) of right lower extremity (HCC)    Lumbar facet arthropathy    Shortness of breath    Chronic deep vein thrombosis (DVT) of proximal vein of both lower extremities (HCC)    Chronic diastolic heart failure (HCC)    Neurogenic claudication due to lumbar spinal stenosis    Sacroiliitis ([facility])    Stage 3 chronic kidney disease ([facility])    Type 2 diabetes mellitus with circulatory disorder, without long-term current use of insulin (HCC)    Chronic deep vein thrombosis (DVT) of popliteal vein of right lower extremity (HCC)    Closed fracture of left wrist    B12 deficiency    Iron deficiency anemia secondary to inadequate dietary iron intake    Diarrhea due to malabsorption    Closed head injury    Scalp laceration    Abnormal finding on imaging    Other irritable bowel syndrome    Frequent falls    Double vision    Muscle soreness    GI bleed    Peptic ulcer    Melena    PUD (peptic ulcer disease)    Absolute anemia    Acute blood loss anemia    Acute renal failure (HCC)    Clostridium difficile infection    Acquired spondylolisthesis    Spinal stenosis of lumbar region with neurogenic claudication    Acute deep vein thrombosis (DVT) of proximal vein of left lower extremity (HCC)    Leg assist with her symptoms    Pt has verbalized understanding and agreement to this plan.         The patient was instructed to start taking some OTC Probiotics products   These are available over the counter at the Pharmacy stores and Grocery stores  He was explained about the beneficial effects they have in the GI track  They will help to establish the good bacterial russel and will help with the digestion and bowel movements  The patient has verbalized understanding and agreement to this plan      More than half of patient's clinic visit time was spent in counseling about lifestyle and dietary modifications  Patient's  questions were answered in this regard as well  The patient has verbalized understanding and agreement     Discussed with her

## 2020-07-02 NOTE — PROGRESS NOTES
Patient ID: Marcelino Nguyen is a 72 y.o. female    Chief Compliant:  Chief Complaint   Patient presents with    Follow-up     lumbar fusion         History of Present Illness: This is a 72 y.o. female who presents to the clinic today for 2 week follow up status post L5-S1 PLIF L4-L5 revision. Her left leg numbness has completely improved. She no longer has leg pain, and her back is improving. Patient is doing well. Review of Systems   Constitutional: Negative for fever, sweats, weight loss, recent injury, or recent illness  Neurological: Negative for  headaches, numbness, or focal weakness. Integumentary: Negative for abrasion, laceration, rash, ecchymosis. Musculoskeletal: Positive for Follow-up (lumbar fusion )         Physical Exam:  Constitutional: Patient is oriented to person, place, and time. Patient appears well-developed and well nourished. Musculoskeletal: Patient in wheelchair. Staples discontinued. Neurological: Patient is alert and oriented to person, place, and time. Normal strenght. No sensory deficit. Skin: Skin is warm and dry    Nursing note and vitals reviewed. Labs and Imaging:     XR taken today: AP and lateral lumbar spine obtained reviewed by myself in clinic today status post L4 to sacrum fusion with pelvic screws due to a Chance fracture developed after an L4-5 PLIF with a spondylolisthesis of the superior half of L5 on the inferior half of L5         Orders Placed This Encounter   Procedures    XR LUMBAR SPINE (2-3 VIEWS)     Standing Status:   Future     Number of Occurrences:   1     Standing Expiration Date:   7/2/2021       Assessment and Plan:  1. Acquired spondylolisthesis    2. Lumbar facet arthropathy    3. Spondylosis of lumbar region without myelopathy or radiculopathy    4. Lumbar degenerative disc disease    5. Facet arthritis of lumbar region    6.  Spinal stenosis, lumbar region, without neurogenic claudication      Resolution of by radicular leg pain; much better than expected outcome    Staples discontinued    Follow up in 4 weeks    Scribe Attestation:   By signing my name below, I, Yvette Davenport, attest that this documentation has been prepared under the direction and in the presence of Maciel Longoria MD.     Electronically Signed: Yvette Davenport. 7/2/20. 2:33 PM EDT. Provider Attestation:  Carlita Matthews, personally performed the services described in this documentation. All medical record entries made by the scribe were at my direction and in my presence. I have reviewed the chart and discharge instructions and agree that the records reflect my personal performance and is accurate and complete. Mariana Brice MD 07/02/20        Please note that this chart was generated using voice recognition Dragon dictation software. Although every effort was made to ensure the accuracy of this automated transcription, some errors in transcription may have occurred.

## 2020-07-07 ENCOUNTER — CARE COORDINATION (OUTPATIENT)
Dept: CASE MANAGEMENT | Age: 70
End: 2020-07-07

## 2020-07-07 PROCEDURE — 1111F DSCHRG MED/CURRENT MED MERGE: CPT | Performed by: INTERNAL MEDICINE

## 2020-07-07 NOTE — CARE COORDINATION
Care transition note      Pt called to schedule an appt with dr Dilia Rojo.  Gave her Declined further help        Jonn Leo -[phone]

## 2020-07-07 NOTE — CARE COORDINATION
[facility] 45 Transitions Initial Follow Up Call    Call within 2 business days of discharge: Yes    Patient: Emile Lyn Patient : 1950   MRN: [id number]  Reason for Admission: Closed unstable burst fx of 5L vertebra with routine healing. Discharge Date: 20 RARS: Readmission Risk Score: 19      Last Discharge [facility]       Complaint Diagnosis Description Type Department Provider    20  Closed unstable burst fracture of fifth lumbar vertebra with routine healing Admission (Discharged) SHAYNE Guerni MD      Patient s/p L5-S1 PLIF L4-L5 REVISION on  -20 at [facility]. Discharged to [facility] on 20-20 with Carole Gonzalez per [address] [facility] [address]. Patient states she is doing okay. Pain has improved to 4/10. Pain control managed with Norco 1-2 weekly. Patient has a bm daily. Stool is hard. I suggested over the counter stool softeners. She denies sob, n/v, fever, chills or bladder concerns. Appetite good. Discussed dietary options for bm promotion, fluids, green leafy vegetables and foods high in fiber. Patient acknowledged understanding. BS this am 163 after eating an apple at 3am. Patient states it usually runs in the 90's. Patient advised to discuss with PCP. Patient is taking a anti-gylcemic. Medications reviewed. Discussed Hydralazine 50mg daily for SBP >140 order. Patient stated it said 3 times a day. Patient seen by ortho and Gastro on 20. Medications reconciled during visit. Order for hydralazine same as in [address] [place] [address] Rd. Provided education. Patient acknowledged understanding. Patient advised to make a PCP appointment. Declined assistance She said she would call today. Patient ambulates using a walker. [address] [address] [address] nurse out to the home on 20. Therapy call expected today. Will call to check on PCP appointment in a few days.      Spoke with: Ashley Elizabeth    Facility: [facility]    Non-face-to-face services provided:      Care Transitions 24 Hour Call    Do you have any ongoing symptoms?:  Yes  Patient-reported symptoms:  Pain  Do you have all of your prescriptions and are they filled?:  Yes (Comment: waiting for a couple from her mail order pharmacy)  Have you been contacted by a [address]?:  No  Have you scheduled your follow up appointment?:  Yes  Were you discharged with any Home Care or Post Acute Services:  Yes  Post Acute Services:  Home Health (Comment: [facility])  Patient DME:  Darontamar Randall, Other  Other Patient DME:  gr[employer] bars  Do you have support at home?:  Partner/Spouse/SO  Do you feel like you have everything you need to keep you well at home?:  Yes  Are you an active caregiver in your home?:  No  Care Transitions Interventions     Other Services:   (Comment: set up vns/OL)          Follow Up  Future Appointments   Date Time Provider Britni Marshall   7/30/2020  1:40 PM Rani Mckeon MD [facility]   10/8/2020  1:45 PM Apryl Kirkpatrick MD [address] [place] [address],

## 2020-07-08 ENCOUNTER — TELEPHONE (OUTPATIENT)
Dept: INTERNAL MEDICINE CLINIC | Age: 70
End: 2020-07-08

## 2020-07-08 ENCOUNTER — CARE COORDINATION (OUTPATIENT)
Dept: CASE MANAGEMENT | Age: 70
End: 2020-07-08

## 2020-07-08 NOTE — CARE COORDINATION
[facility] Transitions Follow Up Call    2020    Patient: Deeann Saint  Patient : 1950   MRN: [id number]  Reason for Admission: Vertebral fx  Discharge Date: 20 RARS: Readmission Risk Score: 19    Pt called me to order Januvia. Routed message to Dr. General Arteaga [phone]     Spoke with:  [facility] Transitions Subsequent and Final Call    Subsequent and Final Calls  Care Transitions Interventions  Other Interventions:             Follow Up  Future Appointments   Date Time Provider Britni Marshall   2020 11:00 AM Amber Mancilla, [address] [place] [address]   2020  1:40 PM Gaby Clark MD [facility]   10/8/2020  1:45 PM [facility]       Shiloh Mcdaniels RN

## 2020-07-08 NOTE — TELEPHONE ENCOUNTER
Medication: Januvia   Last visit: 3/25/2020  Next visit: 7/30/2020  Last refill: 10/10/2019  Pharmacy: [address] [place] [address]

## 2020-07-15 ENCOUNTER — CARE COORDINATION (OUTPATIENT)
Dept: CASE MANAGEMENT | Age: 70
End: 2020-07-15

## 2020-07-15 NOTE — TELEPHONE ENCOUNTER
Patient would like to get a refill for her pain medication. Patient had surgery on 6-17-20. Patient's pharmacy is [facility]    Please contact patient when/if ordered.     Thanks

## 2020-07-15 NOTE — CARE COORDINATION
[facility] Transitions Follow Up Call    7/15/2020    Patient: Nickolas Solid  Patient : 1950   MRN: [id number]  Reason for Admission: Closed unstable burst fx of 5L vertebra with routine healing  Discharge Date: 20 RARS: Readmission Risk Score: 19  Patient states she is doing just fine. Denies pain. States therapist just arrived. PCP appointment scheduled 20. Will follow up next week to check status of progress. SEGUN Baez RN  Care Transition Nurse [phone]           Spoke with: Lianna Moody    Care Transitions Subsequent and Final Call    Subsequent and Final Calls  Care Transitions Interventions  Other Interventions:             Follow Up  Future Appointments   Date Time Provider Britni Marshall   2020 11:00 AM Lucina Jeffries MD [address]   2020  1:40 PM Jamarcus Lubin MD [facility]   10/8/2020  1:45 PM Gerald Shrestha MD [facility] Kenyon Gallegos RN

## 2020-07-16 RX ORDER — HYDROCODONE BITARTRATE AND ACETAMINOPHEN 5; 325 MG/1; MG/1
1 TABLET ORAL EVERY 6 HOURS PRN
Qty: 28 TABLET | Refills: 0 | Status: SHIPPED | OUTPATIENT
Start: 2020-07-16 | End: 2020-07-23

## 2020-07-20 RX ORDER — ATORVASTATIN CALCIUM 80 MG/1
40 TABLET, FILM COATED ORAL NIGHTLY
Qty: 90 TABLET | Refills: 3 | Status: SHIPPED | OUTPATIENT
Start: 2020-07-20 | End: 2020-10-21 | Stop reason: SDUPTHER

## 2020-07-20 RX ORDER — CITALOPRAM 10 MG/1
10 TABLET ORAL DAILY
Qty: 90 TABLET | Refills: 3 | Status: SHIPPED | OUTPATIENT
Start: 2020-07-20 | End: 2020-10-09 | Stop reason: SDUPTHER

## 2020-07-20 RX ORDER — PRAMIPEXOLE DIHYDROCHLORIDE 1 MG/1
1.5 TABLET ORAL DAILY
Qty: 145 TABLET | Refills: 3 | Status: SHIPPED | OUTPATIENT
Start: 2020-07-20 | End: 2020-10-27 | Stop reason: SDUPTHER

## 2020-07-20 NOTE — TELEPHONE ENCOUNTER
Medication: Lipitor, Celexa, Mirapex  Last visit: 3/25/20  Next visit: 7/30/2020  Last refill: Lipitor & Mirapex 3/16/20, Celexa 1/2/20  Pharmacy: [facility]  Mail away

## 2020-07-28 ENCOUNTER — TELEPHONE (OUTPATIENT)
Dept: INTERNAL MEDICINE CLINIC | Age: 70
End: 2020-07-28

## 2020-07-28 NOTE — TELEPHONE ENCOUNTER
Also PT called to inform that her BP was 180/90 this was before therapy started with no symptoms. Will also extend PT for 2 times for 3 weeks and then one time a week for 1 week.       Patient is scheduled in the office on 7/30

## 2020-07-30 ENCOUNTER — OFFICE VISIT (OUTPATIENT)
Dept: INTERNAL MEDICINE CLINIC | Age: 70
End: 2020-07-30
Payer: MEDICARE

## 2020-07-30 ENCOUNTER — OFFICE VISIT (OUTPATIENT)
Dept: ORTHOPEDIC SURGERY | Age: 70
End: 2020-07-30

## 2020-07-30 VITALS
SYSTOLIC BLOOD PRESSURE: 164 MMHG | OXYGEN SATURATION: 97 % | HEIGHT: 62 IN | HEART RATE: 70 BPM | TEMPERATURE: 97.5 F | BODY MASS INDEX: 29.44 KG/M2 | WEIGHT: 160 LBS | DIASTOLIC BLOOD PRESSURE: 92 MMHG

## 2020-07-30 PROBLEM — F33.1 MAJOR DEPRESSIVE DISORDER, RECURRENT, MODERATE (HCC): Status: ACTIVE | Noted: 2020-07-30

## 2020-07-30 PROCEDURE — 99024 POSTOP FOLLOW-UP VISIT: CPT | Performed by: ORTHOPAEDIC SURGERY

## 2020-07-30 PROCEDURE — 99215 OFFICE O/P EST HI 40 MIN: CPT | Performed by: INTERNAL MEDICINE

## 2020-07-30 PROCEDURE — G8510 SCR DEP NEG, NO PLAN REQD: HCPCS | Performed by: INTERNAL MEDICINE

## 2020-07-30 PROCEDURE — 1111F DSCHRG MED/CURRENT MED MERGE: CPT | Performed by: INTERNAL MEDICINE

## 2020-07-30 PROCEDURE — 3288F FALL RISK ASSESSMENT DOCD: CPT | Performed by: INTERNAL MEDICINE

## 2020-07-30 RX ORDER — CARVEDILOL 12.5 MG/1
12.5 TABLET ORAL 2 TIMES DAILY
Qty: 60 TABLET | Refills: 3 | Status: SHIPPED | OUTPATIENT
Start: 2020-07-30 | End: 2020-09-24 | Stop reason: SDUPTHER

## 2020-07-30 ASSESSMENT — PATIENT HEALTH QUESTIONNAIRE - PHQ9
SUM OF ALL RESPONSES TO PHQ9 QUESTIONS 1 & 2: 0
SUM OF ALL RESPONSES TO PHQ QUESTIONS 1-9: 0
SUM OF ALL RESPONSES TO PHQ QUESTIONS 1-9: 0
1. LITTLE INTEREST OR PLEASURE IN DOING THINGS: 0
2. FEELING DOWN, DEPRESSED OR HOPELESS: 0

## 2020-07-30 NOTE — PROGRESS NOTES
Patient ID: Marilee Kennedy is a 72 y.o. female    Chief Compliant:  Chief Complaint   Patient presents with    Follow-up     lumbar         History of Present Illness: This is a 72 y.o. female who presents to the clinic today for 2 week follow up status post L4-sacrum PLIF revision. Patient has no major complaints. Patient continues to show significant improvement although she did have a recent fall post trying to get a watermelon out of the refrigerator      Review of Systems   Constitutional: Negative for fever, sweats, weight loss, recent injury, or recent illness  Neurological: Negative for  headaches, numbness, or focal weakness. Integumentary: Negative for abrasion, laceration, rash, ecchymosis.    Musculoskeletal: Positive for Follow-up (lumbar )       Past History:    Current Outpatient Medications:     carvedilol (COREG) 12.5 MG tablet, Take 1 tablet by mouth 2 times daily, Disp: 60 tablet, Rfl: 3    pramipexole (MIRAPEX) 1 MG tablet, Take 1.5 tablets by mouth daily, Disp: 145 tablet, Rfl: 3    citalopram (CELEXA) 10 MG tablet, Take 1 tablet by mouth daily, Disp: 90 tablet, Rfl: 3    atorvastatin (LIPITOR) 80 MG tablet, Take 0.5 tablets by mouth nightly, Disp: 90 tablet, Rfl: 3    SITagliptin (JANUVIA) 100 MG tablet, Take 0.5 tablets by mouth daily, Disp: 90 tablet, Rfl: 3    ferrous sulfate (IRON 325) 325 (65 Fe) MG tablet, Take 1 tablet by mouth 2 times daily, Disp: 90 tablet, Rfl: 2    furosemide (LASIX) 40 MG tablet, Take 40 mg by mouth 2 times daily, Disp: , Rfl:     ondansetron (ZOFRAN) 4 MG tablet, Take 4 mg by mouth daily as needed for Nausea or Vomiting, Disp: , Rfl:     pantoprazole (PROTONIX) 40 MG tablet, Take 1 tablet by mouth 2 times daily (before meals), Disp: 180 tablet, Rfl: 3    fenofibrate micronized (LOFIBRA) 134 MG capsule, Take 1 capsule by mouth every morning (before breakfast), Disp: 90 capsule, Rfl: 3    lisinopril (PRINIVIL;ZESTRIL) 20 MG tablet, Take 1 tablet by refused     Attends Yazidi service: Patient refused     Active member of club or organization: Patient refused     Attends meetings of clubs or organizations: Patient refused     Relationship status: Patient refused    Intimate partner violence     Fear of current or ex partner: Not on file     Emotionally abused: Not on file     Physically abused: Not on file     Forced sexual activity: Not on file   Other Topics Concern    Not on file   Social History Narrative    Not on file     Past Medical History:   Diagnosis Date    Allergic rhinitis, cause unspecified     Back pain     lumbar    Bowel obstruction ([facility]     history of due to scar tissue, resolved non-surgically    C. difficile diarrhea     CAD (coronary artery disease)     Cellulitis     left leg    Cellulitis 2017 August    leg left leg/bug bite    Cerebral artery occlusion with cerebral infarction [facility])     TIA    Diverticulosis of colon (without mention of hemorrhage)     GERD (gastroesophageal reflux disease)     History of MI (myocardial infarction) 2005    thought due to a blood clot    History of ovarian cyst 1970    had oopherectomy holly    History of peritonitis 1968    due to ruptured appendix age 13    HTN (hypertension)     Hx of blood clots     right leg    Hyperlipidemia     Intestinal or peritoneal adhesions with obstruction (postoperative) (postinfection) ([facility]     Kidney infection     renal failure/sepsis/spider bite    Lateral epicondylitis  of elbow     MDRO (multiple drug resistant organisms) resistance     c diff    Muscle strain     right posterior shoulder    Other abnormal glucose     PONV (postoperative nausea and vomiting)     dry heaves    Pre-diabetes     Restless legs syndrome (RLS)     TIA (transient ischemic attack) 2014    Vitamin D deficiency     Wears glasses      Past Surgical History:   Procedure Laterality Date    ABDOMEN SURGERY  1976    benign tumor removed near remaining ovary, 1.5 pounds    APPENDECTOMY  1968    appendix ruptured, developed peritonitis    BUNIONECTOMY Left     along with calcium deposits removed   [address]  2005    negative    COLECTOMY  1969    12 INCHES REMOVED D/T OBSTRUCTION    COLONOSCOPY      CYST REMOVAL Right     right facial    HYSTERECTOMY  1973    taken as a result of recurring cysts    LUMBAR FUSION N/A 2/10/2020    LUMBAR L4-5 POSTERIOR  DECOMPRESSION INSTRUMENTATION FUSION WCEMENT AUGMENTATION/ performed by Arlene Chopra MD at [place] [address] N/A 6/17/2020    L5-S1 PLIF L4-L5 REVISION performed by Arlene Chopra MD at [address] [place] [address]  8/14/14    FESS   [address] due to cyst    OVARY REMOVAL  1971    partial, due to cyst   [facility] SINUS SURGERY  2004    UPPER GASTROINTESTINAL ENDOSCOPY N/A 5/31/2019    EGD ESOPHAGOGASTRODUODENOSCOPY performed by Michelle Martins MD at [facility] N/A 8/5/2019    EGD BIOPSY performed by Sukhdeep Corona MD at [facility] N/A 8/23/2019    EGD BIOPSY performed by Michelle Martins MD at [address] [place] [address] 3/5/2019    WRIST OPEN REDUCTION INTERNAL FIXATION performed by Cristhian Devine MD at [facility] History   Problem Relation Age of Onset    Stroke Mother     Diabetes Mother     Heart Disease Mother     High Blood Pressure Mother     Heart Disease Father     Heart Disease Brother     High Blood Pressure Brother     Heart Disease Maternal Grandmother     High Blood Pressure Sister         Physical Exam:  Constitutional: Patient is oriented to person, place, and time. Patient appears well-developed and well nourished. HENT: Negative otherwise noted  Head: Normocephalic and Atraumatic  Nose: Normal  Eyes: Conjunctivae and EOM are normal  Neck: Normal range of motion Neck supple. Respiratory/Cardio: Effort normal. No respiratory distress. Musculoskeletal: Patient uses walker. Neurological: Patient is alert and oriented to person, place, and time. Normal strenght. No sensory deficit. Skin: Skin is warm and dry  Psychiatric: Behavior is normal. Thought content normal.  Nursing note and vitals reviewed. Walks slightly forward hunched    Labs and Imaging:     XR taken today: AP and lateral lumbar spine status post L4 to sacrum PLIF status post 4 5 PLIF with Chance fracture through L5 with spondylolisthesis through the mid body of 5 stable no change with respect to previous x-rays      Assessment and Plan:  1. Closed unstable burst fracture of fifth lumbar vertebra with routine healing    2. Acquired spondylolisthesis    3. Lumbar degenerative disc disease      Doing better than expected post L4 to sacrum fusion for L5 Chance fracture    Scribe Attestation:   By signing my name below, I, Jt Graves, attest that this documentation has been prepared under the direction and in the presence of Galen Mars MD.     Electronically Signed: Jt Graves. 7/30/20. 1:42 PM EDT. Provider Attestation:  Hannah Matthews, personally performed the services described in this documentation. All medical record entries made by the scribe were at my direction and in my presence. I have reviewed the chart and discharge instructions and agree that the records reflect my personal performance and is accurate and complete. Storm Dalal MD 07/30/20       Please note that this chart was generated using voice recognition Dragon dictation software. Although every effort was made to ensure the accuracy of this automated transcription, some errors in transcription may have occurred.

## 2020-07-30 NOTE — PROGRESS NOTES
Subjective:      Chief Complaint   Patient presents with    Follow-Up from Hospital     f/u [facility] and Professor Erica Burgos for Back Surgery, Pt states she had a fall 2 days ago     Hypertension     Pt BP was checked by homecare nurse it was elevatted 180/90    Discuss Medications     Pt will like to know if she should be taking Alroy Lava        Patient ID: Trish Tony is a 72 y.o. female. Visit Information    Have you changed or started any medications since your last visit including any over-the-counter medicines, vitamins, or herbal medicines? no   Are you having any side effects from any of your medications? -  no  Have you stopped taking any of your medications? Is so, why? -  no    Have you seen any other physician or provider since your last visit? Yes - Records Obtained  Have you had any other diagnostic tests since your last visit? Yes - Records Obtained  Have you been seen in the emergency room and/or had an admission to a hospital since we last saw you? Yes - Records Obtained  Have you had your routine dental cleaning in the past 6 months? no    Have you activated your [employer] account? If not, what are your barriers?  Yes     Patient Care Team:  Melly Hutchins MD as PCP - General (Internal Medicine)  Melly Hutchins MD [facility] PCP [facility]  Marquis Yahaira MD as Consulting Physician (Gastroenterology)  Jeanne Hutchins RN as Care Transitions Nurse  Grecia Flores, RN as Care Transitions Nurse    Medical History Review  Past Medical, Family, and Social History reviewed and does not contribute to the patient presenting condition    Health Maintenance   Topic Date Due    Diabetic foot exam  01/18/1961    Diabetic retinal exam  01/18/1961    DTaP/Tdap/Td vaccine (1 - Tdap) 01/18/1970    Annual Wellness Visit (AWV)  05/29/2019    Breast cancer screen  08/04/2019    Lipid screen  05/20/2020    Shingles Vaccine (1 of 2) 10/10/2020 (Originally 1/18/2001)    Flu vaccine (1) 09/01/2020    A1C test (Diabetic or Prediabetic)  03/04/2021    Potassium monitoring  06/25/2021    Creatinine monitoring  06/25/2021    Colon cancer screen colonoscopy  10/07/2026    DEXA (modify frequency per FRAX score)  Completed    Pneumococcal 65+ years Vaccine  Completed    Hepatitis C screen  Completed    Hepatitis A vaccine  Aged Out    Hib vaccine  Aged Out    Meningococcal (ACWY) vaccine  Aged Out       HPI    Review of Systems    Objective:   Physical Exam    Assessment / Plan: On the basis of positive falls risk screening, assessment and plan is as follows: Advice to Continue with celexa .

## 2020-07-31 ENCOUNTER — CARE COORDINATION (OUTPATIENT)
Dept: CASE MANAGEMENT | Age: 70
End: 2020-07-31

## 2020-08-02 ASSESSMENT — ENCOUNTER SYMPTOMS
EYE REDNESS: 0
EYE PAIN: 0
APNEA: 0
ABDOMINAL DISTENTION: 0
BLOOD IN STOOL: 0
COLOR CHANGE: 0
CONSTIPATION: 0
CHEST TIGHTNESS: 0
EYE DISCHARGE: 0
EYE ITCHING: 0
COUGH: 0
ABDOMINAL PAIN: 0
BACK PAIN: 1
SHORTNESS OF BREATH: 0
DIARRHEA: 0
CHOKING: 0

## 2020-08-03 NOTE — PROGRESS NOTES
Post-Discharge Transitional Care Management Services or Hospital Follow Up      Preeti Scott   YOB: 1950    Date of Office Visit:  7/30/2020  Date of Hospital Admission: 6/17/20  Date of Hospital Discharge: 6/19/20  Readmission Risk Score(high >=14%.  Medium >=10%):Readmission Risk Score: 19      Care management risk score Rising risk (score 2-5) and Complex Care (Scores >=6): 12     Non face to face  following discharge, date last encounter closed (first attempt may have been earlier): *No documented post hospital discharge outreach found in the last 14 days *No documented post hospital discharge outreach found in the last 14 days    Call initiated 2 business days of discharge: *No response recorded in the last 14 days     Patient Active Problem List   Diagnosis    Other specified disorders of rotator cuff syndrome of shoulder and allied disorders    Diverticulosis of large intestine    Intestinal or peritoneal adhesions with obstruction (postoperative) (postinfection) ([address]    Restless legs syndrome (RLS)    GERD (gastroesophageal reflux disease)    Essential hypertension    Mixed hyperlipidemia    Other abnormal glucose    Atherosclerosis    Lateral epicondylitis    Allergic rhinitis    Vitamin D deficiency    Depression    Degenerative joint disease (DJD) of hip    Peripheral edema    Injury of foot, left    Facial droop    CVA (cerebral vascular accident) ([address]    Bradycardia    Ataxia    Aphasia    TIA (transient ischemic attack)    Need for prophylactic vaccination and inoculation against cholera alone    Chest pain    Osteopenia    Lumbago    Facet arthritis of lumbar region    Meralgia paresthetica of right side    Lumbar degenerative disc disease    Spondylosis of lumbar region without myelopathy or radiculopathy    Blood poisoning    Cellulitis of left lower extremity    Encounter for medication monitoring    Deep vein thrombosis (DVT) of right lower extremity ([address]    Lumbar facet arthropathy    Shortness of breath    Chronic deep vein thrombosis (DVT) of proximal vein of both lower extremities (HCC)    Chronic diastolic heart failure (HCC)    Neurogenic claudication due to lumbar spinal stenosis    Sacroiliitis (HCC)    Stage 3 chronic kidney disease (HCC)    Type 2 diabetes mellitus with circulatory disorder, without long-term current use of insulin (HCC)    Chronic deep vein thrombosis (DVT) of popliteal vein of right lower extremity (HCC)    Closed fracture of left wrist    B12 deficiency    Iron deficiency anemia secondary to inadequate dietary iron intake    Diarrhea due to malabsorption    Closed head injury    Scalp laceration    Abnormal finding on imaging    Other irritable bowel syndrome    Frequent falls    Double vision    Muscle soreness    GI bleed    Peptic ulcer    Melena    PUD (peptic ulcer disease)    Absolute anemia    Acute blood loss anemia    Acute renal failure (HCC)    Clostridium difficile infection    Acquired spondylolisthesis    Spinal stenosis of lumbar region with neurogenic claudication    Acute deep vein thrombosis (DVT) of proximal vein of left lower extremity (HCC)    Leg swelling    Back pain    Neurological disorder    Closed unstable burst fracture of fifth lumbar vertebra with routine healing    Slow transit constipation    Major depressive disorder, recurrent, moderate (HCC)       Allergies   Allergen Reactions    Mobic [Meloxicam]     Bactrim [Sulfamethoxazole-Trimethoprim] Other (See Comments)     sepsis    Codeine Itching    Seasonal        Medications listed as ordered at the time of discharge from hospital   [facility] Medication Instructions MEENAKSHI:    Printed on:08/02/20 2047   Medication Information                      atorvastatin (LIPITOR) 80 MG tablet  Take 0.5 tablets by mouth nightly             blood glucose monitor strips  Test 2 times a day & as needed for symptoms of irregular blood glucose. Calcium Carbonate-Vitamin D (CALCIUM 500/D) 500-125 MG-UNIT TABS  Take 1 tablet by mouth 2 times daily              carvedilol (COREG) 12.5 MG tablet  Take 1 tablet by mouth 2 times daily             citalopram (CELEXA) 10 MG tablet  Take 1 tablet by mouth daily             fenofibrate micronized (LOFIBRA) 134 MG capsule  Take 1 capsule by mouth every morning (before breakfast)             ferrous sulfate (IRON 325) 325 (65 Fe) MG tablet  Take 1 tablet by mouth 2 times daily             furosemide (LASIX) 40 MG tablet  Take 40 mg by mouth 2 times daily             Lancets MISC  1 each by Does not apply route daily             lidocaine (XYLOCAINE) 5 % ointment  4-5 times a day to your right thigh for pain.              lisinopril (PRINIVIL;ZESTRIL) 20 MG tablet  Take 1 tablet by mouth daily             nitroGLYCERIN (NITROSTAT) 0.4 MG SL tablet  Place 1 tablet under the tongue every 5 minutes as needed for Chest pain             ondansetron (ZOFRAN) 4 MG tablet  Take 4 mg by mouth daily as needed for Nausea or Vomiting             pantoprazole (PROTONIX) 40 MG tablet  Take 1 tablet by mouth 2 times daily (before meals)             pramipexole (MIRAPEX) 1 MG tablet  Take 1.5 tablets by mouth daily             SITagliptin (JANUVIA) 100 MG tablet  Take 0.5 tablets by mouth daily             VITAMIN D, ERGOCALCIFEROL, PO  Take by mouth                   Medications marked \"taking\" at this time  Outpatient Medications Marked as Taking for the 7/30/20 encounter (Office Visit) with Grayson Kulkarni MD   Medication Sig Dispense Refill    carvedilol (COREG) 12.5 MG tablet Take 1 tablet by mouth 2 times daily 60 tablet 3    pramipexole (MIRAPEX) 1 MG tablet Take 1.5 tablets by mouth daily 145 tablet 3    citalopram (CELEXA) 10 MG tablet Take 1 tablet by mouth daily 90 tablet 3    atorvastatin (LIPITOR) 80 MG tablet Take 0.5 tablets by mouth nightly 90 tablet 3    chart.    Interval history/Current status: Improved     Review of Systems   Constitutional: Negative for activity change, appetite change, chills, diaphoresis, fatigue and fever. HENT: Negative for congestion, dental problem, drooling and ear discharge. Eyes: Negative for pain, discharge, redness and itching. Respiratory: Negative for apnea, cough, choking, chest tightness and shortness of breath. Cardiovascular: Negative for chest pain and leg swelling. Gastrointestinal: Negative for abdominal distention, abdominal pain, blood in stool, constipation and diarrhea. Endocrine: Negative for cold intolerance and heat intolerance. Genitourinary: Negative for difficulty urinating, dysuria, enuresis, flank pain and frequency. Musculoskeletal: Positive for arthralgias, back pain and gait problem. Negative for joint swelling. Skin: Negative for color change, pallor and rash. Neurological: Positive for dizziness. Negative for facial asymmetry, light-headedness, numbness and headaches. Psychiatric/Behavioral: Negative for agitation, behavioral problems, confusion, decreased concentration and dysphoric mood. Vitals:    07/30/20 1108 07/30/20 1117   BP: (!) 162/94 (!) 164/92   Pulse: 70    Temp: 97.5 °F (36.4 °C)    SpO2: 97%    Weight: 160 lb (72.6 kg)    Height: 5' 2.01\" (1.575 m)      Body mass index is 29.26 kg/m². Wt Readings from Last 3 Encounters:   07/30/20 160 lb (72.6 kg)   06/17/20 146 lb (66.2 kg)   06/11/20 146 lb (66.2 kg)     BP Readings from Last 3 Encounters:   07/30/20 (!) 164/92   06/19/20 (!) 110/41   06/17/20 135/62       Physical Exam  Constitutional:       Appearance: She is well-developed. She is not diaphoretic. Comments: Uses walker    HENT:      Head: Normocephalic and atraumatic. Mouth/Throat:      Pharynx: No oropharyngeal exudate. Eyes:      General: No scleral icterus. Right eye: No discharge. Left eye: No discharge. Conjunctiva/sclera: Conjunctivae normal.      Pupils: Pupils are equal, round, and reactive to light. Neck:      Musculoskeletal: Normal range of motion and neck supple. Thyroid: No thyromegaly. Vascular: No JVD. Trachea: No tracheal deviation. Cardiovascular:      Rate and Rhythm: Normal rate. Heart sounds: Normal heart sounds. No murmur. No gallop. Pulmonary:      Effort: Pulmonary effort is normal. No respiratory distress. Breath sounds: Normal breath sounds. No stridor. No wheezing or rales. Chest:      Chest wall: No tenderness. Abdominal:      General: Bowel sounds are normal. There is no distension. Palpations: Abdomen is soft. Tenderness: There is no abdominal tenderness. There is no guarding or rebound. Musculoskeletal: Normal range of motion. Neurological:      Mental Status: She is alert and oriented to person, place, and time. Assessment/Plan:  1. Depression, unspecified depression type  Stable     2. Type 2 diabetes mellitus with other circulatory complication, without long-term current use of insulin (HCC)  Stable   3. Essential hypertension  Increasing dose of Coreg to 12.5 BID   - carvedilol (COREG) 12.5 MG tablet; Take 1 tablet by mouth 2 times daily  Dispense: 60 tablet; Refill: 3    4. Sacroiliitis (HCC)  Stable     5. Major depressive disorder, recurrent, moderate (HCC)  On celexa     6.  At high risk for falls  Has Home health   Uses walker       Patient had chronic DVT in month of February, she is still on anticoagulation  Explained to the patient in length, she should be stopping anticoagulation, since she already has taken more than 4 months of anticoagulation    Medical Decision Making: high complexity

## 2020-08-07 RX ORDER — LISINOPRIL 20 MG/1
20 TABLET ORAL DAILY
Qty: 90 TABLET | Refills: 3 | Status: SHIPPED | OUTPATIENT
Start: 2020-08-07 | End: 2020-09-18 | Stop reason: SDUPTHER

## 2020-08-17 RX ORDER — FENOFIBRATE 134 MG/1
134 CAPSULE ORAL
Qty: 90 CAPSULE | Refills: 3 | Status: SHIPPED | OUTPATIENT
Start: 2020-08-17 | End: 2020-11-12 | Stop reason: SDUPTHER

## 2020-08-18 ENCOUNTER — HOSPITAL ENCOUNTER (OUTPATIENT)
Dept: VASCULAR LAB | Age: 70
Discharge: HOME | End: 2020-08-18
Payer: MEDICARE

## 2020-08-18 ENCOUNTER — OFFICE VISIT (OUTPATIENT)
Dept: INTERNAL MEDICINE CLINIC | Age: 70
End: 2020-08-18
Payer: MEDICARE

## 2020-08-18 VITALS
WEIGHT: 165 LBS | OXYGEN SATURATION: 93 % | HEART RATE: 74 BPM | DIASTOLIC BLOOD PRESSURE: 74 MMHG | SYSTOLIC BLOOD PRESSURE: 138 MMHG | BODY MASS INDEX: 30.36 KG/M2 | HEIGHT: 62 IN | TEMPERATURE: 97.3 F

## 2020-08-18 PROBLEM — R19.7 DIARRHEA DUE TO MALABSORPTION: Status: RESOLVED | Noted: 2019-05-23 | Resolved: 2020-08-18

## 2020-08-18 PROBLEM — K92.2 GI BLEED: Status: RESOLVED | Noted: 2019-08-03 | Resolved: 2020-08-18

## 2020-08-18 PROBLEM — K90.9 DIARRHEA DUE TO MALABSORPTION: Status: RESOLVED | Noted: 2019-05-23 | Resolved: 2020-08-18

## 2020-08-18 PROCEDURE — 1123F ACP DISCUSS/DSCN MKR DOCD: CPT | Performed by: INTERNAL MEDICINE

## 2020-08-18 PROCEDURE — 3017F COLORECTAL CA SCREEN DOC REV: CPT | Performed by: INTERNAL MEDICINE

## 2020-08-18 PROCEDURE — 2022F DILAT RTA XM EVC RTNOPTHY: CPT | Performed by: INTERNAL MEDICINE

## 2020-08-18 PROCEDURE — 1036F TOBACCO NON-USER: CPT | Performed by: INTERNAL MEDICINE

## 2020-08-18 PROCEDURE — 3044F HG A1C LEVEL LT 7.0%: CPT | Performed by: INTERNAL MEDICINE

## 2020-08-18 PROCEDURE — G8399 PT W/DXA RESULTS DOCUMENT: HCPCS | Performed by: INTERNAL MEDICINE

## 2020-08-18 PROCEDURE — 4040F PNEUMOC VAC/ADMIN/RCVD: CPT | Performed by: INTERNAL MEDICINE

## 2020-08-18 PROCEDURE — 93970 EXTREMITY STUDY: CPT

## 2020-08-18 PROCEDURE — 99215 OFFICE O/P EST HI 40 MIN: CPT | Performed by: INTERNAL MEDICINE

## 2020-08-18 PROCEDURE — 93970 EXTREMITY STUDY: CPT | Performed by: INTERNAL MEDICINE

## 2020-08-18 PROCEDURE — 1090F PRES/ABSN URINE INCON ASSESS: CPT | Performed by: INTERNAL MEDICINE

## 2020-08-18 PROCEDURE — G8427 DOCREV CUR MEDS BY ELIG CLIN: HCPCS | Performed by: INTERNAL MEDICINE

## 2020-08-18 PROCEDURE — G8417 CALC BMI ABV UP PARAM F/U: HCPCS | Performed by: INTERNAL MEDICINE

## 2020-08-18 RX ORDER — DOXYCYCLINE HYCLATE 100 MG
100 TABLET ORAL 2 TIMES DAILY
Qty: 20 TABLET | Refills: 0 | Status: SHIPPED | OUTPATIENT
Start: 2020-08-18 | End: 2020-08-28

## 2020-08-18 ASSESSMENT — PATIENT HEALTH QUESTIONNAIRE - PHQ9
SUM OF ALL RESPONSES TO PHQ QUESTIONS 1-9: 0
1. LITTLE INTEREST OR PLEASURE IN DOING THINGS: 0
2. FEELING DOWN, DEPRESSED OR HOPELESS: 0
SUM OF ALL RESPONSES TO PHQ QUESTIONS 1-9: 0
SUM OF ALL RESPONSES TO PHQ9 QUESTIONS 1 & 2: 0

## 2020-08-18 ASSESSMENT — ENCOUNTER SYMPTOMS
BACK PAIN: 1
GASTROINTESTINAL NEGATIVE: 1
EYES NEGATIVE: 1
RESPIRATORY NEGATIVE: 1

## 2020-08-18 NOTE — PROGRESS NOTES
swelling        She has left leg swelling and pain       Review of Systems   Constitutional: Positive for fatigue. HENT: Negative. Eyes: Negative. Respiratory: Negative. Cardiovascular: Negative. Gastrointestinal: Negative. Endocrine: Negative. Genitourinary: Negative. Musculoskeletal: Positive for back pain. Left leg cellulitis    Skin: Negative. Neurological: Negative. Hematological: Negative. Psychiatric/Behavioral: The patient is nervous/anxious. Objective:   Physical Exam  Constitutional:       Appearance: Normal appearance. Cardiovascular:      Rate and Rhythm: Normal rate and regular rhythm. Heart sounds: Murmur present. Comments: Systolic murmur  Pulmonary:      Breath sounds: No wheezing or rhonchi. Abdominal:      General: Abdomen is flat. There is no distension. Palpations: Abdomen is soft. There is no mass. Tenderness: There is no abdominal tenderness. Hernia: No hernia is present. Musculoskeletal:      Left lower leg: She exhibits tenderness and swelling. Edema present. Lymphadenopathy:      Cervical: No cervical adenopathy. Skin:     General: Skin is warm and dry. Neurological:      General: No focal deficit present. Mental Status: She is alert. Assessment / Plan:         Diagnosis Orders   1. Essential hypertension = well controlled     2. TIA (transient ischemic attack)     3. Deep vein thrombosis (DVT) of right lower extremity, unspecified chronicity, unspecified vein (HCC) = will recheck    4. Chronic diastolic heart failure (HCC) = well compensated     5. Type 2 diabetes mellitus with other circulatory complication, without long-term current use of insulin (HCC) =A1c 5.7% in march    6. Gastroesophageal reflux disease without esophagitis - no symptoms     7. Spondylosis of lumbar region without myelopathy or radiculopathy = has pain    8.  Cellulitis of left leg =will treat with doxycycline and rule out DVT doxycycline hyclate (VIBRA-TABS) 100 MG tablet    67801 - [place] Duplex Extrem Venous, Bilat     Return if symptoms worsen or fail to improve= Dr. Mathew Romero, for Follow Up.   Orders Placed This Encounter   Procedures    [id number] - [place] Duplex Extrem Venous, Bilat     Orders Placed This Encounter   Medications    doxycycline hyclate (VIBRA-TABS) 100 MG tablet     Sig: Take 1 tablet by mouth 2 times daily for 10 days     Dispense:  20 tablet     Refill:  0      Time more than 40 minutes

## 2020-08-27 ENCOUNTER — TELEPHONE (OUTPATIENT)
Dept: INTERNAL MEDICINE CLINIC | Age: 70
End: 2020-08-27

## 2020-08-27 NOTE — TELEPHONE ENCOUNTER
Patient called and stated she seen you the other day for cellulitis and you gave her medication for it. She states there are blisters all over her legs.        Please advise

## 2020-08-31 ENCOUNTER — HOSPITAL ENCOUNTER (OUTPATIENT)
Dept: VASCULAR LAB | Age: 70
Discharge: HOME | End: 2020-08-31
Payer: MEDICARE

## 2020-08-31 ENCOUNTER — OFFICE VISIT (OUTPATIENT)
Dept: INTERNAL MEDICINE CLINIC | Age: 70
End: 2020-08-31
Payer: MEDICARE

## 2020-08-31 VITALS
TEMPERATURE: 97.8 F | OXYGEN SATURATION: 98 % | HEIGHT: 62 IN | WEIGHT: 165 LBS | DIASTOLIC BLOOD PRESSURE: 100 MMHG | HEART RATE: 76 BPM | SYSTOLIC BLOOD PRESSURE: 196 MMHG | BODY MASS INDEX: 30.36 KG/M2

## 2020-08-31 PROBLEM — R06.02 SHORTNESS OF BREATH: Status: RESOLVED | Noted: 2018-01-21 | Resolved: 2020-08-31

## 2020-08-31 PROBLEM — I82.412 ACUTE DEEP VEIN THROMBOSIS (DVT) OF FEMORAL VEIN OF LEFT LOWER EXTREMITY (HCC): Status: ACTIVE | Noted: 2020-08-31

## 2020-08-31 PROCEDURE — 1090F PRES/ABSN URINE INCON ASSESS: CPT | Performed by: INTERNAL MEDICINE

## 2020-08-31 PROCEDURE — 3044F HG A1C LEVEL LT 7.0%: CPT | Performed by: INTERNAL MEDICINE

## 2020-08-31 PROCEDURE — G8399 PT W/DXA RESULTS DOCUMENT: HCPCS | Performed by: INTERNAL MEDICINE

## 2020-08-31 PROCEDURE — 3017F COLORECTAL CA SCREEN DOC REV: CPT | Performed by: INTERNAL MEDICINE

## 2020-08-31 PROCEDURE — 93971 EXTREMITY STUDY: CPT

## 2020-08-31 PROCEDURE — 99214 OFFICE O/P EST MOD 30 MIN: CPT | Performed by: INTERNAL MEDICINE

## 2020-08-31 PROCEDURE — 4040F PNEUMOC VAC/ADMIN/RCVD: CPT | Performed by: INTERNAL MEDICINE

## 2020-08-31 PROCEDURE — 1123F ACP DISCUSS/DSCN MKR DOCD: CPT | Performed by: INTERNAL MEDICINE

## 2020-08-31 PROCEDURE — G8417 CALC BMI ABV UP PARAM F/U: HCPCS | Performed by: INTERNAL MEDICINE

## 2020-08-31 PROCEDURE — G8427 DOCREV CUR MEDS BY ELIG CLIN: HCPCS | Performed by: INTERNAL MEDICINE

## 2020-08-31 PROCEDURE — 1036F TOBACCO NON-USER: CPT | Performed by: INTERNAL MEDICINE

## 2020-08-31 PROCEDURE — 93970 EXTREMITY STUDY: CPT

## 2020-08-31 PROCEDURE — 2022F DILAT RTA XM EVC RTNOPTHY: CPT | Performed by: INTERNAL MEDICINE

## 2020-08-31 ASSESSMENT — ENCOUNTER SYMPTOMS
EYES NEGATIVE: 1
BACK PAIN: 1
RESPIRATORY NEGATIVE: 1
GASTROINTESTINAL NEGATIVE: 1

## 2020-08-31 ASSESSMENT — PATIENT HEALTH QUESTIONNAIRE - PHQ9
SUM OF ALL RESPONSES TO PHQ QUESTIONS 1-9: 0
2. FEELING DOWN, DEPRESSED OR HOPELESS: 0
SUM OF ALL RESPONSES TO PHQ QUESTIONS 1-9: 0
SUM OF ALL RESPONSES TO PHQ9 QUESTIONS 1 & 2: 0
1. LITTLE INTEREST OR PLEASURE IN DOING THINGS: 0

## 2020-08-31 NOTE — PROGRESS NOTES
Subjective:      Chief Complaint   Patient presents with    Blister     Pt has blisters on back of left leg and leg swelling     Leg Swelling     Left leg swelling sx started back up about 1 month ago        Patient ID: Angélica Weston is a 72 y.o. female. Visit Information    Have you changed or started any medications since your last visit including any over-the-counter medicines, vitamins, or herbal medicines? no   Are you having any side effects from any of your medications? -  no  Have you stopped taking any of your medications? Is so, why? -  no    Have you seen any other physician or provider since your last visit? No  Have you had any other diagnostic tests since your last visit? No  Have you been seen in the emergency room and/or had an admission to a hospital since we last saw you? No  Have you had your routine dental cleaning in the past 6 months? no    Have you activated your [employer] account? If not, what are your barriers?  Yes     Patient Care Team:  Keila Álvarez MD as PCP - General (Internal Medicine)  Keila Álvarez MD as PCP - [facility]  Michelle Ramirez MD as Consulting Physician (Gastroenterology)    Medical History Review  Past Medical, Family, and Social History reviewed and does not contribute to the patient presenting condition    Health Maintenance   Topic Date Due    Diabetic foot exam  01/18/1961    Diabetic retinal exam  01/18/1961    DTaP/Tdap/Td vaccine (1 - Tdap) 01/18/1970    Annual Wellness Visit (AWV)  05/29/2019    Breast cancer screen  08/04/2019    Lipid screen  05/20/2020    Shingles Vaccine (1 of 2) 10/10/2020 (Originally 1/18/2001)    Flu vaccine (1) 09/01/2020    A1C test (Diabetic or Prediabetic)  03/04/2021    Potassium monitoring  06/25/2021    Creatinine monitoring  06/25/2021    Colon cancer screen colonoscopy  10/07/2026    DEXA (modify frequency per FRAX score)  Completed    Pneumococcal 65+ years Vaccine  Completed    Hepatitis C screen Completed    Hepatitis A vaccine  Aged Out    Hib vaccine  Aged Out    Meningococcal (ACWY) vaccine  Aged Out       She has left leg swelling and pain   It was my impression during her previous visit that she has left leg cellulitis. As far as reexamination shows that there is no evidence of cellulitis but her left leg looks bigger than and right. 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 HENT: Negative. Eyes: Negative. Respiratory: Negative. Cardiovascular: Negative. Gastrointestinal: Negative. Endocrine: Negative. Genitourinary: Negative. Musculoskeletal: Positive for back pain. Left leg cellulitis    Skin: Negative. Neurological: Negative. Hematological: Negative. Psychiatric/Behavioral: The patient is nervous/anxious. Objective:   Physical Exam  Constitutional:       Appearance: Normal appearance. Cardiovascular:      Rate and Rhythm: Normal rate and regular rhythm. Heart sounds: Murmur present. Comments: Systolic murmur  Pulmonary:      Breath sounds: No wheezing or rhonchi. Abdominal:      General: Abdomen is flat. There is no distension. Palpations: Abdomen is soft. There is no mass. Tenderness: There is no abdominal tenderness. Hernia: No hernia is present. Musculoskeletal:      Left lower leg: She exhibits tenderness and swelling. Edema present. Legs:    Lymphadenopathy:      Cervical: No cervical adenopathy. Skin:     General: Skin is warm and dry. Neurological:      General: No focal deficit present. Mental Status: She is alert. Assessment / Plan:      Diagnosis Orders   1. Encounter for screening mammogram for breast cancer     2. Essential hypertension = her blood pressure is elevated today    3.  Cerebrovascular accident (CVA) due to embolism of anterior cerebral artery, unspecified blood vessel laterality (HCC)= neurological symptoms today    4. Deep vein thrombosis (DVT) of right lower extremity, unspecified chronicity, unspecified vein (HCC) = she does have a left leg leg swelling which is unexplained. It is my impression that venous scan might be false negative. I ordered stat bilateral venous scan. My abdominal examination she has no tenderness and no masses palpable    5. Type 2 diabetes mellitus with other circulatory complication, without long-term current use of insulin (HCC) = well-controlled    6. Cellulitis of left lower extremity = improved    7. Acute deep vein thrombosis (DVT) of femoral vein of left lower extremity ([address] = please see my above discussion it is to be noted that she has a history of DVT in the right leg years ago. Her symptoms at present are more focused on the left leg. Please see my comments above         Return in about 2 weeks (around 9/14/2020) for Follow Up, hypertension. Orders Placed This Encounter   Procedures    [id number] - CO Duplex Extrem Venous, Bilat= Stat     Return in about 2 weeks (around 9/14/2020) for Follow Up, hypertension. Orders Placed This Encounter   Procedures    30500 - CO Duplex Extrem Venous, Bilat     No orders of the defined types were placed in this encounter. No orders of the defined types were placed in this encounter. She was advised to call me tomorrow if left leg swelling is persistent.   It is probable that she might have intra-abdominal pelvic pathology which is causing pressure on her venous system

## 2020-09-01 RX ORDER — FUROSEMIDE 40 MG/1
40 TABLET ORAL 2 TIMES DAILY
Qty: 180 TABLET | Refills: 3 | Status: SHIPPED | OUTPATIENT
Start: 2020-09-01 | End: 2021-08-16 | Stop reason: SDUPTHER

## 2020-09-10 ENCOUNTER — OFFICE VISIT (OUTPATIENT)
Dept: ORTHOPEDIC SURGERY | Age: 70
End: 2020-09-10

## 2020-09-10 PROCEDURE — G8417 CALC BMI ABV UP PARAM F/U: HCPCS | Performed by: ORTHOPAEDIC SURGERY

## 2020-09-10 PROCEDURE — 99024 POSTOP FOLLOW-UP VISIT: CPT | Performed by: ORTHOPAEDIC SURGERY

## 2020-09-10 PROCEDURE — G8428 CUR MEDS NOT DOCUMENT: HCPCS | Performed by: ORTHOPAEDIC SURGERY

## 2020-09-10 PROCEDURE — 3017F COLORECTAL CA SCREEN DOC REV: CPT | Performed by: ORTHOPAEDIC SURGERY

## 2020-09-10 PROCEDURE — 4040F PNEUMOC VAC/ADMIN/RCVD: CPT | Performed by: ORTHOPAEDIC SURGERY

## 2020-09-10 PROCEDURE — G8399 PT W/DXA RESULTS DOCUMENT: HCPCS | Performed by: ORTHOPAEDIC SURGERY

## 2020-09-10 PROCEDURE — 1123F ACP DISCUSS/DSCN MKR DOCD: CPT | Performed by: ORTHOPAEDIC SURGERY

## 2020-09-10 PROCEDURE — 1036F TOBACCO NON-USER: CPT | Performed by: ORTHOPAEDIC SURGERY

## 2020-09-10 PROCEDURE — 1090F PRES/ABSN URINE INCON ASSESS: CPT | Performed by: ORTHOPAEDIC SURGERY

## 2020-09-10 NOTE — PROGRESS NOTES
Patient ID: Linda Christianson is a 72 y.o. female. No chief complaint on file.        HPI     She is status post S2-0 PLIF complicated by L5 Chance fracture at the start of coronavirus unable to timely get the patient back to the OR patient subsequently underwent L4 to sacrum fusion with instrumentation to the pelvis    Post second operation left radicular leg pain is gotten substantially better but the patient continues to have a mild left foot drop    Patient also reports frequent falls    Past Medical History:   Diagnosis Date    Allergic rhinitis, cause unspecified     Back pain     lumbar    Bowel obstruction (HCC)     history of due to scar tissue, resolved non-surgically    C. difficile diarrhea     CAD (coronary artery disease)     Cellulitis     left leg    Cellulitis 2017 August    leg left leg/bug bite    Cerebral artery occlusion with cerebral infarction [facility])     TIA    Diverticulosis of colon (without mention of hemorrhage)     GERD (gastroesophageal reflux disease)     History of MI (myocardial infarction) 2005    thought due to a blood clot    History of ovarian cyst 1970    had oopherectomy holly    History of peritonitis 1968    due to ruptured appendix age 13    HTN (hypertension)     Hx of blood clots     right leg    Hyperlipidemia     Intestinal or peritoneal adhesions with obstruction (postoperative) (postinfection) ([address]     Kidney infection     renal failure/sepsis/spider bite    Lateral epicondylitis  of elbow     MDRO (multiple drug resistant organisms) resistance     c diff    Muscle strain     right posterior shoulder    Other abnormal glucose     PONV (postoperative nausea and vomiting)     dry heaves    Pre-diabetes     Restless legs syndrome (RLS)     TIA (transient ischemic attack) 2014    Vitamin D deficiency     Wears glasses      Past Surgical History:   Procedure Laterality Date    ABDOMEN SURGERY  1976    benign tumor removed near remaining ovary, 1.5 pounds    APPENDECTOMY  1968    appendix ruptured, developed peritonitis    BUNIONECTOMY Left     along with calcium deposits removed   [address]  2005    negative    COLECTOMY  1969    12 INCHES REMOVED D/T OBSTRUCTION    COLONOSCOPY      CYST REMOVAL Right     right facial    HYSTERECTOMY  1973    taken as a result of recurring cysts    LUMBAR FUSION N/A 2/10/2020    LUMBAR L4-5 POSTERIOR  DECOMPRESSION INSTRUMENTATION FUSION WCEMENT AUGMENTATION/ performed by Irasema Lugo MD at [place] [id number] N/A 6/17/2020    L5-S1 PLIF L4-L5 REVISION performed by Irasema Lugo MD at [address] [place] [address]  8/14/14    FESS   [facility]    UNILATERAL due to cyst    OVARY REMOVAL  1971    partial, due to cyst   [facility] SINUS SURGERY  2004    UPPER GASTROINTESTINAL ENDOSCOPY N/A 5/31/2019    EGD ESOPHAGOGASTRODUODENOSCOPY performed by Hallie Babinski, MD at [facility] N/A 8/5/2019    EGD BIOPSY performed by Debo So MD at [address] [place] [address] N/A 8/23/2019    EGD BIOPSY performed by Hallie Babinski, MD at [address] 3/5/2019    WRIST OPEN REDUCTION INTERNAL FIXATION performed by Jovana Ortiz MD at [facility] History   Problem Relation Age of Onset    Stroke Mother     Diabetes Mother     Heart Disease Mother     High Blood Pressure Mother     Heart Disease Father     Heart Disease Brother     High Blood Pressure Brother     Heart Disease Maternal Grandmother     High Blood Pressure Sister      Social History     Occupational History    Occupation: retired   Tobacco Use    Smoking status: Former Smoker     Packs/day: 0.50     Years: 20.00     Pack years: 10.00     Types: Cigarettes     Start date: 8/11/1995     Last attempt to quit: 6/20/2017     Years since quitting: 3.2    Smokeless tobacco: Never Used   Substance and Sexual Activity    Alcohol use: No     Alcohol/week: 0.0 standard drinks    Drug use: No    Sexual activity: Not on file        Review of Systems   All other systems reviewed and are negative. Physical Exam  Vitals signs and nursing note reviewed. Constitutional:       Appearance: She is well-developed. HENT:      Head: Normocephalic and atraumatic. Nose: Nose normal.   Eyes:      Conjunctiva/sclera: Conjunctivae normal.   Neck:      Musculoskeletal: Normal range of motion and neck supple. Pulmonary:      Effort: Pulmonary effort is normal. No respiratory distress. Musculoskeletal:      Comments: Normal gait     Skin:     General: Skin is warm and dry. Neurological:      Mental Status: She is alert and oriented to person, place, and time. Sensory: No sensory deficit. Psychiatric:         Behavior: Behavior normal.         Thought Content: Thought content normal.     Patient with 3/5 left foot dorsiflexion but describes a relative foot drop and frequent falling as result. Patient walks with a shuffling gait      Diagnostic x-rays AP and lateral lumbar spine status post L4 to pelvis decompression fusion    AP lateral right hand  Assessment:     Encounter Diagnoses   Name Primary?  Chronic pain of left thumb Yes    Acquired spondylolisthesis     Lumbar degenerative disc disease     Lumbar facet arthropathy     Spondylosis of lumbar region without myelopathy or radiculopathy     Chronic left-sided low back pain with left-sided sciatica     Osteopenia of lumbar spine     Foot drop, left          No diagnosis found. Plan:     MRI cervical spine    Referral to neurology    AFO    No orders of the defined types were placed in this encounter. Joey Plunkett MD    Please note that this chart was generated using voicerecognition Dragon dictation software.   Although every effort was made to ensurethe accuracy of this automated

## 2020-09-14 RX ORDER — PANTOPRAZOLE SODIUM 40 MG/1
40 TABLET, DELAYED RELEASE ORAL
Qty: 180 TABLET | Refills: 3 | Status: SHIPPED | OUTPATIENT
Start: 2020-09-14 | End: 2021-02-11 | Stop reason: SDUPTHER

## 2020-09-14 NOTE — TELEPHONE ENCOUNTER
Medication: protonix  Last visit: 08/31/20  Next visit: 9/18/2020  Last refill: 06/05/20  Pharmacy: [facility] mail

## 2020-09-17 ENCOUNTER — TELEPHONE (OUTPATIENT)
Dept: ORTHOPEDIC SURGERY | Age: 70
End: 2020-09-17

## 2020-09-17 RX ORDER — DIAZEPAM 10 MG/1
TABLET ORAL
Qty: 1 TABLET | Refills: 0 | Status: SHIPPED | OUTPATIENT
Start: 2020-09-17 | End: 2020-09-27

## 2020-09-17 NOTE — TELEPHONE ENCOUNTER
Would like something sent to her pharmacy to help calm her she is scheduled for a closed MRI 9/18/20  [facility] on [facility]

## 2020-09-17 NOTE — TELEPHONE ENCOUNTER
Medication: Januvia  Last visit: 8/31/20  Next visit: 9/18/2020  Last refill: 7/8/2020  Pharmacy: [facility]

## 2020-09-18 ENCOUNTER — OFFICE VISIT (OUTPATIENT)
Dept: INTERNAL MEDICINE CLINIC | Age: 70
End: 2020-09-18
Payer: MEDICARE

## 2020-09-18 VITALS
HEIGHT: 62 IN | SYSTOLIC BLOOD PRESSURE: 146 MMHG | BODY MASS INDEX: 30.73 KG/M2 | DIASTOLIC BLOOD PRESSURE: 92 MMHG | WEIGHT: 167 LBS | TEMPERATURE: 97.5 F

## 2020-09-18 PROCEDURE — 1036F TOBACCO NON-USER: CPT | Performed by: INTERNAL MEDICINE

## 2020-09-18 PROCEDURE — G8427 DOCREV CUR MEDS BY ELIG CLIN: HCPCS | Performed by: INTERNAL MEDICINE

## 2020-09-18 PROCEDURE — 99214 OFFICE O/P EST MOD 30 MIN: CPT | Performed by: INTERNAL MEDICINE

## 2020-09-18 PROCEDURE — 3017F COLORECTAL CA SCREEN DOC REV: CPT | Performed by: INTERNAL MEDICINE

## 2020-09-18 PROCEDURE — 1090F PRES/ABSN URINE INCON ASSESS: CPT | Performed by: INTERNAL MEDICINE

## 2020-09-18 PROCEDURE — G8417 CALC BMI ABV UP PARAM F/U: HCPCS | Performed by: INTERNAL MEDICINE

## 2020-09-18 PROCEDURE — G8399 PT W/DXA RESULTS DOCUMENT: HCPCS | Performed by: INTERNAL MEDICINE

## 2020-09-18 PROCEDURE — 1123F ACP DISCUSS/DSCN MKR DOCD: CPT | Performed by: INTERNAL MEDICINE

## 2020-09-18 PROCEDURE — 4040F PNEUMOC VAC/ADMIN/RCVD: CPT | Performed by: INTERNAL MEDICINE

## 2020-09-18 RX ORDER — LISINOPRIL 30 MG/1
30 TABLET ORAL DAILY
Qty: 30 TABLET | Refills: 1 | Status: SHIPPED | OUTPATIENT
Start: 2020-09-18 | End: 2020-10-21 | Stop reason: SDUPTHER

## 2020-09-18 ASSESSMENT — ENCOUNTER SYMPTOMS
BACK PAIN: 1
CHEST TIGHTNESS: 0
APNEA: 0
EYE REDNESS: 0
BLOOD IN STOOL: 0
ABDOMINAL DISTENTION: 0
DIARRHEA: 0
EYE PAIN: 0
EYE ITCHING: 0
CHOKING: 0
SHORTNESS OF BREATH: 0
CONSTIPATION: 0
ABDOMINAL PAIN: 0
COUGH: 0
EYE DISCHARGE: 0
COLOR CHANGE: 0

## 2020-09-18 ASSESSMENT — PATIENT HEALTH QUESTIONNAIRE - PHQ9
SUM OF ALL RESPONSES TO PHQ QUESTIONS 1-9: 0
2. FEELING DOWN, DEPRESSED OR HOPELESS: 0
SUM OF ALL RESPONSES TO PHQ QUESTIONS 1-9: 0
1. LITTLE INTEREST OR PLEASURE IN DOING THINGS: 0
SUM OF ALL RESPONSES TO PHQ9 QUESTIONS 1 & 2: 0

## 2020-09-18 NOTE — PROGRESS NOTES
Subjective:      Chief Complaint   Patient presents with    Fall     Pt states she has fall 3 times in the last 3 weeks and injuried her eye, back and head. pt has lump on left side of head     Hip Pain     right hip pain     Hypertension    Diabetes     pt has been checking BS weekly that has been around        Patient ID: Shayy Diallo is a 72 y.o. female. Visit Information    Have you changed or started any medications since your last visit including any over-the-counter medicines, vitamins, or herbal medicines? no   Are you having any side effects from any of your medications? -  no  Have you stopped taking any of your medications? Is so, why? -  no    Have you seen any other physician or provider since your last visit? No  Have you had any other diagnostic tests since your last visit? No  Have you been seen in the emergency room and/or had an admission to a hospital since we last saw you? No  Have you had your routine dental cleaning in the past 6 months? no    Have you activated your [employer] account? If not, what are your barriers?  Yes     Patient Care Team:  Shannon Jang MD as PCP - General (Internal Medicine)  Shannon Jang MD as PCP - [facility] Empaneled Provider  Christine Olsen MD as Consulting Physician (Gastroenterology)    Medical History Review  Past Medical, Family, and Social History reviewed and does not contribute to the patient presenting condition    Health Maintenance   Topic Date Due    Diabetic foot exam  01/18/1961    Diabetic retinal exam  01/18/1961    DTaP/Tdap/Td vaccine (1 - Tdap) 01/18/1970    Annual Wellness Visit (AWV)  05/29/2019    Breast cancer screen  08/04/2019    Lipid screen  05/20/2020    Flu vaccine (1) 09/01/2020    Shingles Vaccine (1 of 2) 10/10/2020 (Originally 1/18/2001)    A1C test (Diabetic or Prediabetic)  03/04/2021    Potassium monitoring  06/25/2021    Creatinine monitoring  06/25/2021    Colon cancer screen colonoscopy  10/07/2026    DEXA (modify frequency per FRAX score)  Completed    Pneumococcal 65+ years Vaccine  Completed    Hepatitis C screen  Completed    Hepatitis A vaccine  Aged Out    Hib vaccine  Aged Out    Meningococcal (ACWY) vaccine  Aged Out     HPI- patient is here for evaluation of Multiple falls , she has H/o Broken vertebrae underwent surgery . She had Laruth Sor 3 times in last 4 weeks  She never loose her Consciousness , Just loose her balance   She Noticed pain in Right Hip after last fall Last Sunday ,. She has blackening around left eye ,   No headache   Scheduled for MRI cervical spine on Monday , referred to Neurologist by Ortho   She has noticed swelling in left leg for almost 1 month ,   Had Doppler of legs on 8/31 . Negative for DVT   Was evaluated by Neurologist in 2019 ,      Multiple falls. Likely multifactorial pathology secondary to lumbar spondylosis with severe facet DJD at L4-L5 and L5-S1   Other comorbid conditions include vitamin B12 deficiency  Compliant with Lasix BID     Review of Systems   Constitutional: Negative for activity change, appetite change, chills, diaphoresis, fatigue and fever. HENT: Negative for congestion, dental problem, drooling and ear discharge. Eyes: Negative for pain, discharge, redness and itching. Purple discoloration around the left eye   Respiratory: Negative for apnea, cough, choking, chest tightness and shortness of breath. Cardiovascular: Positive for leg swelling (Swelling on left leg, thickening of skin of left calf). Negative for chest pain. Gastrointestinal: Negative for abdominal distention, abdominal pain, blood in stool, constipation and diarrhea. Endocrine: Negative for cold intolerance and heat intolerance. Genitourinary: Negative for difficulty urinating, dysuria, enuresis, flank pain and frequency. Musculoskeletal: Positive for arthralgias (Right hip area), back pain and gait problem. Negative for joint swelling.    Skin: Negative for color (PRINIVIL;ZESTRIL) 30 MG tablet; Take 1 tablet by mouth daily  Dispense: 30 tablet; Refill: 1      Patient recurrent fall, is likely due to lumbar canal stenosis patient underwent surgeries  Referred to neurologist  Had vitamin B12 deficiency in the past, will recheck vitamin B12 level  Advised to continue with vitamin D  Patient had home health nurse at home,  She should use walker  Discussed with patient and her     · Return in about 4 weeks (around 10/16/2020). · Reviewed prior labs and health maintenance. · Discussed use, benefit, and side effects of prescribed medications. Barriers to medication compliance addressed. All patient questions answered. Pt voiced understanding. Seymour Ornelas MD  [facility]  9/18/2020, 9:34 PM    Please note that this chart was generated using voice recognition Dragon dictation software. Although every effort was made to ensure the accuracy of this automated transcription, some errors in transcription may have occurred.

## 2020-09-21 ENCOUNTER — HOSPITAL ENCOUNTER (OUTPATIENT)
Age: 70
Discharge: HOME | End: 2020-09-21
Payer: MEDICARE

## 2020-09-21 ENCOUNTER — HOSPITAL ENCOUNTER (OUTPATIENT)
Dept: GENERAL RADIOLOGY | Age: 70
Discharge: HOME | End: 2020-09-23
Payer: MEDICARE

## 2020-09-21 ENCOUNTER — HOSPITAL ENCOUNTER (OUTPATIENT)
Dept: CT IMAGING | Age: 70
Discharge: HOME | End: 2020-09-23
Payer: MEDICARE

## 2020-09-21 ENCOUNTER — HOSPITAL ENCOUNTER (OUTPATIENT)
Age: 70
Discharge: HOME | End: 2020-09-23
Payer: MEDICARE

## 2020-09-21 ENCOUNTER — HOSPITAL ENCOUNTER (OUTPATIENT)
Dept: MRI IMAGING | Age: 70
Discharge: HOME | End: 2020-09-23
Payer: MEDICARE

## 2020-09-21 LAB
FOLATE: >20 NG/ML
VITAMIN B-12: 305 PG/ML (ref 232–1245)

## 2020-09-21 PROCEDURE — 73502 X-RAY EXAM HIP UNI 2-3 VIEWS: CPT

## 2020-09-21 PROCEDURE — 70450 CT HEAD/BRAIN W/O DYE: CPT

## 2020-09-21 PROCEDURE — 82746 ASSAY OF FOLIC ACID SERUM: CPT

## 2020-09-21 PROCEDURE — 82607 VITAMIN B-12: CPT

## 2020-09-21 PROCEDURE — 36415 COLL VENOUS BLD VENIPUNCTURE: CPT

## 2020-09-21 PROCEDURE — 72141 MRI NECK SPINE W/O DYE: CPT

## 2020-09-24 RX ORDER — CARVEDILOL 12.5 MG/1
12.5 TABLET ORAL 2 TIMES DAILY
Qty: 180 TABLET | Refills: 3 | Status: SHIPPED | OUTPATIENT
Start: 2020-09-24 | End: 2021-01-19 | Stop reason: SDUPTHER

## 2020-10-01 ENCOUNTER — HOSPITAL ENCOUNTER (OUTPATIENT)
Dept: MRI IMAGING | Age: 70
Discharge: HOME | End: 2020-10-03
Payer: MEDICARE

## 2020-10-01 PROCEDURE — 70551 MRI BRAIN STEM W/O DYE: CPT

## 2020-10-07 ENCOUNTER — TELEPHONE (OUTPATIENT)
Dept: GASTROENTEROLOGY | Age: 70
End: 2020-10-07

## 2020-10-07 NOTE — TELEPHONE ENCOUNTER
Called pt and left vm for her that CBC lab ordered 7/2/20 needs to be completed prior to her office apt tomorrow. Did ask that she complete this prior to apt.

## 2020-10-08 ENCOUNTER — OFFICE VISIT (OUTPATIENT)
Dept: GASTROENTEROLOGY | Age: 70
End: 2020-10-08
Payer: MEDICARE

## 2020-10-08 PROCEDURE — 1123F ACP DISCUSS/DSCN MKR DOCD: CPT | Performed by: INTERNAL MEDICINE

## 2020-10-08 PROCEDURE — G8484 FLU IMMUNIZE NO ADMIN: HCPCS | Performed by: INTERNAL MEDICINE

## 2020-10-08 PROCEDURE — 4040F PNEUMOC VAC/ADMIN/RCVD: CPT | Performed by: INTERNAL MEDICINE

## 2020-10-08 PROCEDURE — G8427 DOCREV CUR MEDS BY ELIG CLIN: HCPCS | Performed by: INTERNAL MEDICINE

## 2020-10-08 PROCEDURE — 3017F COLORECTAL CA SCREEN DOC REV: CPT | Performed by: INTERNAL MEDICINE

## 2020-10-08 PROCEDURE — 1090F PRES/ABSN URINE INCON ASSESS: CPT | Performed by: INTERNAL MEDICINE

## 2020-10-08 PROCEDURE — G8399 PT W/DXA RESULTS DOCUMENT: HCPCS | Performed by: INTERNAL MEDICINE

## 2020-10-08 PROCEDURE — 99214 OFFICE O/P EST MOD 30 MIN: CPT | Performed by: INTERNAL MEDICINE

## 2020-10-08 PROCEDURE — 1036F TOBACCO NON-USER: CPT | Performed by: INTERNAL MEDICINE

## 2020-10-08 PROCEDURE — G8417 CALC BMI ABV UP PARAM F/U: HCPCS | Performed by: INTERNAL MEDICINE

## 2020-10-08 ASSESSMENT — ENCOUNTER SYMPTOMS
DIARRHEA: 0
ABDOMINAL DISTENTION: 0
SINUS PRESSURE: 0
RESPIRATORY NEGATIVE: 1
SORE THROAT: 0
ABDOMINAL PAIN: 0
WHEEZING: 0
EYES NEGATIVE: 1
TROUBLE SWALLOWING: 0
BLOOD IN STOOL: 0
VOICE CHANGE: 0
NAUSEA: 0
ANAL BLEEDING: 0
COUGH: 0
GASTROINTESTINAL NEGATIVE: 1
ALLERGIC/IMMUNOLOGIC NEGATIVE: 1
CONSTIPATION: 0
CHOKING: 0
RECTAL PAIN: 0
BACK PAIN: 1
VOMITING: 0

## 2020-10-08 NOTE — PROGRESS NOTES
GI OFFICE FOLLOW UP    Lyudmila Finnegan is a 72 y.o. female evaluated via on 10/8/2020. Consent:  She and/or health care decision maker is aware that that she may receive a bill for this telephone service, depending on her insurance coverage, and has provided verbal consent to proceed: YES      INTERVAL HISTORY:   No referring provider defined for this encounter. Chief Complaint   Patient presents with    Anemia     did not complete CBC, no rectal bleeding    Gastroesophageal Reflux     no issues with this       1. Other iron deficiency anemia    2. Slow transit constipation    3. PUD (peptic ulcer disease)      This patient evaluated in my office accompanied by her daughter    She has history for ulcers in the past    No recent blood test is available    Previous hemoglobin was 10 which is warner from 8    Has been on iron pills  Denies any overt rectal bleeding melanotic stools has some darker stools from iron       has some abdominal bloating and gas symptoms     No smoking alcohol abuse illicit drug usage        HISTORY OF PRESENT ILLNESS: Ms.Janice RIA Littlejohn is a 72 y.o. female with a past history remarkable for , referred for evaluation of   Chief Complaint   Patient presents with    Anemia     did not complete CBC, no rectal bleeding    Gastroesophageal Reflux     no issues with this   . Past Medical,Family, and Social History reviewed and does contribute to the patient presenting condition. Patient's PMH/PSH,SH,PSYCH Hx, MEDs, ALLERGIES, and ROS were all reviewed and updated in the appropriate sections.     PAST MEDICAL HISTORY:  Past Medical History:   Diagnosis Date    Allergic rhinitis, cause unspecified     Back pain     lumbar    Bowel obstruction (HCC)     history of due to scar tissue, resolved non-surgically    C. difficile diarrhea     CAD (coronary artery disease)     Cellulitis left leg    Cellulitis 2017 August    leg left leg/bug bite    Cerebral artery occlusion with cerebral infarction [facility])     TIA    Diverticulosis of colon (without mention of hemorrhage)     GERD (gastroesophageal reflux disease)     History of MI (myocardial infarction) 2005    thought due to a blood clot    History of ovarian cyst 1970    had oopherectomy holly    History of peritonitis 1968    due to ruptured appendix age 13    HTN (hypertension)     Hx of blood clots     right leg    Hyperlipidemia     Intestinal or peritoneal adhesions with obstruction (postoperative) (postinfection) ([address]     Kidney infection     renal failure/sepsis/spider bite    Lateral epicondylitis  of elbow     MDRO (multiple drug resistant organisms) resistance     c diff    Muscle strain     right posterior shoulder    Other abnormal glucose     PONV (postoperative nausea and vomiting)     dry heaves    Pre-diabetes     Restless legs syndrome (RLS)     TIA (transient ischemic attack) 2014    Vitamin D deficiency     Wears glasses        Past Surgical History:   Procedure Laterality Date    ABDOMEN SURGERY  1976    benign tumor removed near remaining ovary, 1.5 pounds    APPENDECTOMY  1968    appendix ruptured, developed peritonitis    BUNIONECTOMY Left     along with calcium deposits removed   [address]  2005    negative    COLECTOMY  1969    12 INCHES REMOVED D/T OBSTRUCTION    COLONOSCOPY      CYST REMOVAL Right     right facial    HYSTERECTOMY  1973    taken as a result of recurring cysts    LUMBAR FUSION N/A 2/10/2020    LUMBAR L4-5 POSTERIOR  DECOMPRESSION INSTRUMENTATION FUSION WCEMENT AUGMENTATION/ performed by Charity Pollock MD at [place] [address] N/A 6/17/2020    L5-S1 PLIF L4-L5 REVISION performed by Charity Pollock MD at [address] [place] [address]  8/14/14    FESS    OVARY REMOVAL  1970    UNILATERAL due to cyst   [email or web address] partial, due to cyst   [facility]  [facility]    UPPER GASTROINTESTINAL ENDOSCOPY N/A 5/31/2019    EGD ESOPHAGOGASTRODUODENOSCOPY performed by Seamus Pratt MD at [facility]. N/A 8/5/2019    EGD BIOPSY performed by Ethan Chapman MD at [facility]. N/A 8/23/2019    EGD BIOPSY performed by Seamus Pratt MD at [facility] 3/5/2019    WRIST OPEN REDUCTION INTERNAL FIXATION performed by Hugh Lunsford MD at [facility]:    Current Outpatient Medications:     carvedilol (COREG) 12.5 MG tablet, Take 1 tablet by mouth 2 times daily, Disp: 180 tablet, Rfl: 3    lisinopril (PRINIVIL;ZESTRIL) 30 MG tablet, Take 1 tablet by mouth daily, Disp: 30 tablet, Rfl: 1    SITagliptin (JANUVIA) 100 MG tablet, Take 0.5 tablets by mouth daily, Disp: 90 tablet, Rfl: 3    pantoprazole (PROTONIX) 40 MG tablet, Take 1 tablet by mouth 2 times daily (before meals), Disp: 180 tablet, Rfl: 3    furosemide (LASIX) 40 MG tablet, Take 1 tablet by mouth 2 times daily, Disp: 180 tablet, Rfl: 3    fenofibrate micronized (LOFIBRA) 134 MG capsule, Take 1 capsule by mouth every morning (before breakfast), Disp: 90 capsule, Rfl: 3    pramipexole (MIRAPEX) 1 MG tablet, Take 1.5 tablets by mouth daily, Disp: 145 tablet, Rfl: 3    citalopram (CELEXA) 10 MG tablet, Take 1 tablet by mouth daily, Disp: 90 tablet, Rfl: 3    atorvastatin (LIPITOR) 80 MG tablet, Take 0.5 tablets by mouth nightly, Disp: 90 tablet, Rfl: 3    ferrous sulfate (IRON 325) 325 (65 Fe) MG tablet, Take 1 tablet by mouth 2 times daily, Disp: 90 tablet, Rfl: 2    ondansetron (ZOFRAN) 4 MG tablet, Take 4 mg by mouth daily as needed for Nausea or Vomiting, Disp: , Rfl:     VITAMIN D, ERGOCALCIFEROL, PO, Take by mouth, Disp: , Rfl:     Lancets MISC, 1 each by Does not apply route daily, Disp: 100 each, Rfl: 3    blood glucose monitor strips, Test 2 times a day & as needed for symptoms of irregular blood glucose., Disp: 100 strip, Rfl: 5    lidocaine (XYLOCAINE) 5 % ointment, 4-5 times a day to your right thigh for pain., Disp: 240 g, Rfl: 3    nitroGLYCERIN (NITROSTAT) 0.4 MG SL tablet, Place 1 tablet under the tongue every 5 minutes as needed for Chest pain, Disp: 75 tablet, Rfl: 3    Calcium Carbonate-Vitamin D (CALCIUM 500/D) 500-125 MG-UNIT TABS, Take 1 tablet by mouth 2 times daily , Disp: , Rfl:     ALLERGIES:   Allergies   Allergen Reactions    Mobic [Meloxicam]     Bactrim [Sulfamethoxazole-Trimethoprim] Other (See Comments)     sepsis    Codeine Itching    Seasonal        FAMILY HISTORY:       Problem Relation Age of Onset    Stroke Mother     Diabetes Mother     Heart Disease Mother     High Blood Pressure Mother     Heart Disease Father     Heart Disease Brother     High Blood Pressure Brother     Heart Disease Maternal Grandmother     High Blood Pressure Sister          SOCIAL HISTORY:   Social History     Socioeconomic History    Marital status:      Spouse name: Not on file    Number of children: Not on file    Years of education: Not on file    Highest education level: Not on file   Occupational History    Occupation: retired   Social Needs    Financial resource strain: Not on file    Food insecurity     Worry: Not on file     Inability: Not on file   [place] needs     Medical: Not on file     Non-medical: Not on file   Tobacco Use    Smoking status: Former Smoker     Packs/day: 0.50     Years: 20.00     Pack years: 10.00     Types: Cigarettes     Start date: 8/11/1995     Last attempt to quit: 6/20/2017     Years since quitting: 3.3    Smokeless tobacco: Never Used   Substance and Sexual Activity    Alcohol use: No     Alcohol/week: 0.0 standard drinks    Drug use: No    Sexual activity: Not on file   Lifestyle    Physical activity     Days per week: Not on file Minutes per session: Not on file    Stress: Not on file   Relationships    Social connections     Talks on phone: Patient refused     Gets together: Patient refused     Attends Spiritism service: Patient refused     Active member of club or organization: Patient refused     Attends meetings of clubs or organizations: Patient refused     Relationship status: Patient refused    Intimate partner violence     Fear of current or ex partner: Not on file     Emotionally abused: Not on file     Physically abused: Not on file     Forced sexual activity: Not on file   Other Topics Concern    Not on file   Social History Narrative    Not on file         REVIEW OF SYSTEMS:         Review of Systems   Constitutional: Negative. Negative for appetite change, fatigue and unexpected weight change. HENT: Negative. Negative for dental problem, postnasal drip, sinus pressure, sore throat, trouble swallowing and voice change. Denies   Eyes: Negative. Negative for visual disturbance. Respiratory: Negative. Negative for cough, choking and wheezing. Cardiovascular: Negative for chest pain, palpitations and leg swelling. Gastrointestinal: Negative. Negative for abdominal distention, abdominal pain, anal bleeding, blood in stool, constipation (pain meds), diarrhea, nausea, rectal pain and vomiting. Denies   Endocrine: Negative. Genitourinary: Negative. Negative for difficulty urinating. Musculoskeletal: Positive for back pain. Negative for arthralgias, gait problem and myalgias. Skin: Negative. Allergic/Immunologic: Negative. Negative for environmental allergies and food allergies. Neurological: Positive for weakness. Negative for dizziness, light-headedness, numbness and headaches. Hematological: Negative. Does not bruise/bleed easily. Psychiatric/Behavioral: Negative. Negative for sleep disturbance. The patient is not nervous/anxious.         PHYSICAL EXAMINATION: Vital signs reviewed per the nursing documentation. There were no vitals taken for this visit. There is no height or weight on file to calculate BMI. Physical Exam  Nursing note reviewed. Constitutional:       Appearance: She is well-developed. Comments: Anxious    HENT:      Head: Normocephalic and atraumatic. Eyes:      Conjunctiva/sclera: Conjunctivae normal.      Pupils: Pupils are equal, round, and reactive to light. Neck:      Musculoskeletal: Normal range of motion and neck supple. Cardiovascular:      Heart sounds: Normal heart sounds. Pulmonary:      Effort: Pulmonary effort is normal.      Breath sounds: Normal breath sounds. Abdominal:      General: Bowel sounds are normal.      Palpations: Abdomen is soft. Comments: NON TENDER, NON DISTENTED  LIVER SPLEEN AND HERNIAS ARE NOT  PALPABLE  BOWEL SOUNDS ARE POSITIVE      Musculoskeletal: Normal range of motion. Skin:     General: Skin is warm. Neurological:      Mental Status: She is alert and oriented to person, place, and time.    Psychiatric:         Behavior: Behavior normal.           LABORATORY DATA: Reviewed  Lab Results   Component Value Date    WBC 7.5 06/23/2020    HGB 8.6 (L) 06/23/2020    HCT 26.5 (L) 06/23/2020    MCV 96.7 06/23/2020     06/23/2020     06/25/2020    K 3.9 06/25/2020    CL 91 (L) 06/25/2020    CO2 29 06/25/2020    BUN 26 (H) 06/25/2020    CREATININE 1.04 (H) 06/25/2020    LABPROT 7.3 01/09/2013    LABALBU 4.5 01/27/2020    BILITOT 0.31 01/27/2020    ALKPHOS 39 01/27/2020    AST 22 01/27/2020    ALT 17 01/27/2020    INR 1.0 04/08/2020         Lab Results   Component Value Date    RBC 2.74 (L) 06/23/2020    HGB 8.6 (L) 06/23/2020    MCV 96.7 06/23/2020    MCH 31.4 06/23/2020    MCHC 32.5 06/23/2020    RDW 14.6 (H) 06/23/2020    MPV 10.1 06/23/2020    BASOPCT 1 06/03/2020    LYMPHSABS 1.50 06/03/2020    MONOSABS 0.50 06/03/2020    NEUTROABS 4.30 06/03/2020    EOSABS 0.50 (H) 06/03/2020    BASOSABS 0.10 06/03/2020         DIAGNOSTIC TESTING:     Xr Lumbar Spine (2-3 Views)    Result Date: 9/15/2020  x-rays AP and lateral lumbar spine status post L4 to pelvis decompression fusion    Xr Hip Right (2-3 Views)    Result Date: 9/21/2020  EXAMINATION: TWO XRAY VIEWS OF THE RIGHT HIP 9/21/2020 1:50 pm COMPARISON: Pelvis series May 30, 2019. HISTORY: ORDERING SYSTEM PROVIDED HISTORY: Right hip pain TECHNOLOGIST PROVIDED HISTORY: Reason for Exam: right hip pain x 1 week Acuity: Acute Type of Exam: Initial Mechanism of Injury: Fall FINDINGS: No focal soft tissue abnormality. No acute bony process. No significant degenerative change of the right hip. No acute bony process or significant degenerative change. Ct Head Wo Contrast    Result Date: 9/21/2020  EXAMINATION: CT OF THE HEAD WITHOUT CONTRAST  9/21/2020 12:05 pm TECHNIQUE: CT of the head was performed without the administration of intravenous contrast. Dose modulation, iterative reconstruction, and/or weight based adjustment of the mA/kV was utilized to reduce the radiation dose to as low as reasonably achievable. COMPARISON: 05/30/2019, 04/26/2006 HISTORY: ORDERING SYSTEM PROVIDED HISTORY: Recurrent falls TECHNOLOGIST PROVIDED HISTORY: Reason for Exam: patient fell a week ago, bruise to left side of face Acuity: Unknown Type of Exam: Unknown FINDINGS: BRAIN/VENTRICLES: Mild prominence of cortical sulci representing mild cortical atrophy. No midline shift. Basal cisterns normally outlined. No acute intracranial hemorrhage or infarct. ORBITS: The visualized portion of the orbits demonstrate no acute abnormality. SINUSES: The visualized paranasal sinuses and mastoid air cells demonstrate no acute abnormality. Right maxillary sinus mucosal thickening noted. Irregular ossification about 2.5 x 1.5 x 2.2 cm noted in the right maxillary sinus unchanged dating back to 2006 SOFT TISSUES/SKULL:  No acute abnormality of the visualized skull or soft tissues.      No acute intracranial abnormality. Mild cerebral atrophy. Mri Cervical Spine Wo Contrast    Result Date: 9/21/2020  EXAMINATION: MRI OF THE CERVICAL SPINE WITHOUT CONTRAST 9/21/2020 12:39 pm TECHNIQUE: Multiplanar multisequence MRI of the cervical spine was performed without the administration of intravenous contrast. COMPARISON: None. HISTORY: ORDERING SYSTEM PROVIDED HISTORY: Foot drop, left TECHNOLOGIST PROVIDED HISTORY: Reason for Exam: Foot drop (Left) Frequent falls Acuity: Acute Type of Exam: Initial Additional signs and symptoms: Per patient - Neck pain x3 months prior. (Frequent falls). Relevant Medical/Surgical History: No surgical hx to area of interest. FINDINGS: Motion degrades images limiting evaluation. BONES/ALIGNMENT: The vertebral body heights appear maintained. There appears to be minimal grade 1 anterolisthesis at T2-T3. No significant spondylolisthesis at the remaining levels. Loss of disc space height is seen at C5-C6. There is bone marrow edema involving the left C3 and C4 facets, which may be degenerative in nature. SPINAL CORD: No convincing abnormal cord signal. SOFT TISSUES: No paraspinal mass identified. C2-C3: There is a central disc protrusion along with buckling of the ligamentum flavum. Mild-to-moderate spinal canal stenosis. No significant neural foraminal narrowing. C3-C4: There is a disc bulge with buckling of the ligamentum flavum contributing to moderate spinal canal stenosis. Uncovertebral joint and facet arthrosis contribute to severe right and moderate left neural foraminal narrowing. C4-C5: There is a posterior scratch head there is a disc bulge with buckling of the ligamentum flavum contributing to moderate spinal canal stenosis. Uncovertebral joint and facet arthrosis contribute to moderate to severe bilateral neural foraminal narrowing. C5-C6: There is a disc bulge with buckling of the ligamentum flavum contributing to moderate spinal canal stenosis.   Uncovertebral joint and facet arthrosis contribute to severe bilateral neural foraminal narrowing. C6-C7: There is a disc bulge indenting on the ventral thecal sac. Minimal spinal canal stenosis. Uncovertebral joint and facet arthrosis contribute to moderate to severe bilateral neural foraminal narrowing. Small bilateral perineural root sleeve cysts are noted. C7-T1: There is no significant disc bulge, spinal canal stenosis or neural foraminal narrowing. 1. Patient motion partially limits evaluation. 2. Degenerative changes contribute to moderate spinal canal stenosis at C3-C4 through C5-C6. Mild-to-moderate spinal canal stenosis at C2-C3 with minimal stenosis at C6-C7. 3. Multilevel neural foraminal narrowing as above. 4. Bone marrow edema is seen involving is the left C3 and C4 facets, which may be degenerative in nature. Xr Finger Right (min 2 Views)    Result Date: 9/15/2020  AP and lateral right thumb avulsion radial condyle proximal phalanx consistent with a radial collateral ligament injury    Mri Brain Wo Contrast    Result Date: 10/1/2020  EXAMINATION: MRI OF THE BRAIN WITHOUT CONTRAST  10/1/2020 1:26 pm TECHNIQUE: Multiplanar multisequence MRI of the brain was performed without the administration of intravenous contrast. COMPARISON: CT brain performed 09/21/2020. HISTORY: ORDERING SYSTEM PROVIDED HISTORY: Ataxia TECHNOLOGIST PROVIDED HISTORY: Reason for Exam: Ataxia Acuity: Acute Type of Exam: Unknown Additional signs and symptoms: Pt c/o frequent falls & difficulty writing x 1 year Relevant Medical/Surgical History: No surgery to area fo interest. Hx - HBP, CVA and Diabetes (Type II). FINDINGS: INTRACRANIAL STRUCTURES/VENTRICLES: The sellar and suprasellar structures, optic chiasm, corpus callosum, pineal gland, tectum, and midline brainstem structures are unremarkable. The craniocervical junction is unremarkable. There is no acute hemorrhage, mass effect, or midline shift.   There is satisfactory overall gray-white matter differentiation. There is chronic microvascular disease. The ventricular structures are symmetric and unremarkable. The infratentorial structures including the cerebellopontine angles and internal auditory canals are unremarkable. There is no abnormal restricted diffusion. There is no abnormal blooming artifact on susceptibility weighted imaging. ORBITS: The visualized portion of the orbits demonstrate no acute abnormality. SINUSES: There is chronic sinusitis most pronounced in the maxillary sinuses. The mastoid air cells are normally aerated. BONES/SOFT TISSUES: The bone marrow signal intensity appears normal. The soft tissues demonstrate no acute abnormality. Chronic microvascular disease without acute intracranial abnormality. Chronic sinusitis most pronounced in the maxillary sinuses. Assessment  1. Other iron deficiency anemia    2. Slow transit constipation    3. PUD (peptic ulcer disease)        Plan    Check CBC    Continue taking iron pills    Pt was discussed in detail about the possible side effects of proton pump inhibiter therapy. She was explained about the possibility of calcium and magnesium malabsorption and was advised to start taking calcium supplements with Vit D. Some over the counter regimens were explained to patient. Some dietary advices were also given. She has verbalized understanding and agreement to this. Pt was advised in detail about some life style and dietary modifications. She was advised about avoidance of caffeine, nicotine and chocolate. Pt was also told to stay away from any kind of fast foods, soda pops. She was also advised to avoid lots of spices, grease and fried food etc.     Instructions were also given about trying to arrange the timing, quality and quantity of food.     Instructions were given about using ample amount of fiber including dietary and supplemental fiber either metamucil, bennafiber or citrucell etc.  Pt was advised about drinking ample amount of water without any colors or chemicals. Stress was given about regular exercise. Pt has verbalized understanding and agreement to these modifications. More than half of patient's clinic visit time was spent in counseling about lifestyle and dietary modifications  Patient's  questions were answered in this regard as well  The patient has verbalized understanding and agreement     I communicated with the patient and/or health care decision maker about   Details of this discussion including any medical advice provided:YES      I affirm this is a Patient Initiated Episode with an Established Patient who has not had a related appointment within my department in the past 7 days or scheduled within the next 24 hours. Total Time: minutes: 21-30 minutes    Note: not billable if this call serves to triage the patient into an appointment for the relevant concern      Thank you for allowing me to participate in the care of Ms. Art Rincon. For any further questions please do not hesitate to contact me. I have reviewed and agree with the ROS entered by the MA/LPN.          Mary Chavira MD, [facility]  Board Certified in Gastroenterology and [address] [place] [address] Gastroenterology  Office #: [phone]

## 2020-10-09 RX ORDER — CITALOPRAM 10 MG/1
10 TABLET ORAL DAILY
Qty: 90 TABLET | Refills: 3 | Status: SHIPPED | OUTPATIENT
Start: 2020-10-09 | End: 2020-11-04 | Stop reason: SDUPTHER

## 2020-10-09 NOTE — TELEPHONE ENCOUNTER
Medication: Celexa  Last visit: 9/18/20  Next visit: 11/5/2020  Last refill: 7/20/20  Pharmacy: [facility]

## 2020-10-21 ENCOUNTER — TELEPHONE (OUTPATIENT)
Dept: INTERNAL MEDICINE CLINIC | Age: 70
End: 2020-10-21

## 2020-10-21 RX ORDER — LISINOPRIL 30 MG/1
30 TABLET ORAL DAILY
Qty: 90 TABLET | Refills: 3 | Status: SHIPPED | OUTPATIENT
Start: 2020-10-21 | End: 2021-04-08 | Stop reason: SDUPTHER

## 2020-10-21 RX ORDER — ATORVASTATIN CALCIUM 80 MG/1
40 TABLET, FILM COATED ORAL NIGHTLY
Qty: 90 TABLET | Refills: 3 | Status: SHIPPED | OUTPATIENT
Start: 2020-10-21 | End: 2021-04-01 | Stop reason: SDUPTHER

## 2020-10-21 NOTE — TELEPHONE ENCOUNTER
Medication: Atorvastatin and Lisinopril   Last visit: 9/18/2020  Next visit: 11/5/2020  Last refill: 7/20/2020  Pharmacy: [address]

## 2020-10-27 RX ORDER — PRAMIPEXOLE DIHYDROCHLORIDE 1 MG/1
1.5 TABLET ORAL DAILY
Qty: 145 TABLET | Refills: 3 | Status: SHIPPED | OUTPATIENT
Start: 2020-10-27 | End: 2020-11-04 | Stop reason: SDUPTHER

## 2020-11-03 PROBLEM — M47.816 LUMBAR FACET ARTHROPATHY: Status: RESOLVED | Noted: 2020-11-03 | Resolved: 2020-11-03

## 2020-11-04 RX ORDER — CITALOPRAM 10 MG/1
10 TABLET ORAL DAILY
Qty: 90 TABLET | Refills: 3 | Status: SHIPPED | OUTPATIENT
Start: 2020-11-04 | End: 2020-11-24 | Stop reason: SDUPTHER

## 2020-11-04 RX ORDER — PRAMIPEXOLE DIHYDROCHLORIDE 1 MG/1
1.5 TABLET ORAL DAILY
Qty: 145 TABLET | Refills: 3 | Status: SHIPPED | OUTPATIENT
Start: 2020-11-04 | End: 2020-12-03 | Stop reason: ALTCHOICE

## 2020-11-09 ENCOUNTER — TELEPHONE (OUTPATIENT)
Dept: INTERNAL MEDICINE CLINIC | Age: 70
End: 2020-11-09

## 2020-11-09 NOTE — TELEPHONE ENCOUNTER
Patient not seen in the office since 9/18  I do not think I can admit her directly, I have not seen in office for almost 2 months  Please involve [place] in her care

## 2020-11-09 NOTE — TELEPHONE ENCOUNTER
If order is given Nicarmen Bliss will need one of the MA's from our office to give her a call with all jaylon's info including her current  medication list

## 2020-11-10 NOTE — TELEPHONE ENCOUNTER
Patient will need to be seen by provider or have PT evaluation by home care. Unless patient has already been approved by insurance some other way to be admitted to SNF. Once evaluation is done, it will need to be sent to SNF and they will need to get her approved by her insurance.

## 2020-11-10 NOTE — TELEPHONE ENCOUNTER
Yani Elaine  is already scheduled  for doctor Jamison Pompa on  11/16/2020.  Yani Elaine is going to keep that appt we can use the notes from that appt

## 2020-11-10 NOTE — TELEPHONE ENCOUNTER
Let me know when appointment is completed. I can help with communicate with [facility] or order home health if needed.

## 2020-11-10 NOTE — TELEPHONE ENCOUNTER
I talked to iris from [facility] [place] she will need:    - a history and physical from the last 30 days. Patient is going to make an appt to be seen in the office for this.    -she will needs an order/RX to admit to [facility]  for rehab  for weakness .    - Current list of Medications.

## 2020-11-12 RX ORDER — FENOFIBRATE 134 MG/1
134 CAPSULE ORAL
Qty: 90 CAPSULE | Refills: 3 | Status: SHIPPED | OUTPATIENT
Start: 2020-11-12 | End: 2021-03-11 | Stop reason: SDUPTHER

## 2020-11-12 NOTE — TELEPHONE ENCOUNTER
Medication: Fenofibrate   Last visit: 9/18/20  Next visit: 11/16/2020  Last refill: 8/17/20  Pharmacy: [facility]

## 2020-11-16 ENCOUNTER — VIRTUAL VISIT (OUTPATIENT)
Dept: INTERNAL MEDICINE CLINIC | Age: 70
End: 2020-11-16
Payer: MEDICARE

## 2020-11-16 PROBLEM — M48.02 CERVICAL STENOSIS OF SPINAL CANAL: Status: ACTIVE | Noted: 2020-11-16

## 2020-11-16 PROCEDURE — 99443 PR PHYS/QHP TELEPHONE EVALUATION 21-30 MIN: CPT | Performed by: INTERNAL MEDICINE

## 2020-11-16 ASSESSMENT — PATIENT HEALTH QUESTIONNAIRE - PHQ9
2. FEELING DOWN, DEPRESSED OR HOPELESS: 1
SUM OF ALL RESPONSES TO PHQ9 QUESTIONS 1 & 2: 2
SUM OF ALL RESPONSES TO PHQ QUESTIONS 1-9: 2
1. LITTLE INTEREST OR PLEASURE IN DOING THINGS: 1

## 2020-11-16 ASSESSMENT — ENCOUNTER SYMPTOMS
EYES NEGATIVE: 1
GASTROINTESTINAL NEGATIVE: 1
RESPIRATORY NEGATIVE: 1
BACK PAIN: 1

## 2020-11-16 NOTE — PROGRESS NOTES
Subjective:      Patient ID: Todd Vargas is a 72 y.o. female. She has frequent falls and is unable to keep her balance MRI examination  Of cervical spine showed severe spinal stenosis and she has had multiple back surgeries       Review of Systems   Constitutional: Positive for fatigue. HENT: Negative. Eyes: Negative. Respiratory: Negative. Cardiovascular: Negative. Gastrointestinal: Negative. Endocrine: Negative. Genitourinary: Negative. Musculoskeletal: Positive for back pain and gait problem. Ataxia   Skin: Negative. Hematological: Negative. Psychiatric/Behavioral: The patient is nervous/anxious. Objective:   Physical Exam=telephone visit    Assessment / Plan:      Diagnosis Orders   1. Cerebrovascular accident (CVA) due to embolism of anterior cerebral artery, unspecified blood vessel laterality ([facility] [address]     2. Essential hypertension     3. Chronic diastolic heart failure ([facility] [address]     4. Chronic deep vein thrombosis (DVT) of proximal vein of both lower extremities (HCC)     5. Type 2 diabetes mellitus with other circulatory complication, without long-term current use of insulin ([facility])     6. 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 Will admit to [facility]    Visit Information    Have you changed or started any medications since your last visit including any over-the-counter medicines, vitamins, or herbal medicines? no   Are you having any side effects from any of your medications? -  no  Have you stopped taking any of your medications? Is so, why? -  no    Have you seen any other physician or provider since your last visit? No  Have you had any other diagnostic tests since your last visit? No  Have you been seen in the emergency room and/or had an admission to a hospital since we last saw you?  No  Have you had your routine dental cleaning in the past 6 months? no    Have you

## 2020-11-24 RX ORDER — CITALOPRAM 10 MG/1
10 TABLET ORAL DAILY
Qty: 90 TABLET | Refills: 3 | Status: SHIPPED | OUTPATIENT
Start: 2020-11-24 | End: 2021-02-26 | Stop reason: SDUPTHER

## 2020-11-24 NOTE — TELEPHONE ENCOUNTER
Medication: Celexa  Last visit: 11/16/2020  Next visit: 12/3/2020  Last refill:11/4/2020  Pharmacy: Meds by mail

## 2020-12-02 ENCOUNTER — TELEPHONE (OUTPATIENT)
Dept: INTERNAL MEDICINE CLINIC | Age: 70
End: 2020-12-02

## 2020-12-03 ENCOUNTER — OFFICE VISIT (OUTPATIENT)
Dept: INTERNAL MEDICINE CLINIC | Age: 70
End: 2020-12-03
Payer: MEDICARE

## 2020-12-03 VITALS
DIASTOLIC BLOOD PRESSURE: 76 MMHG | RESPIRATION RATE: 16 BRPM | BODY MASS INDEX: 33.18 KG/M2 | HEIGHT: 60 IN | HEART RATE: 72 BPM | TEMPERATURE: 96.8 F | SYSTOLIC BLOOD PRESSURE: 136 MMHG | WEIGHT: 169 LBS

## 2020-12-03 PROCEDURE — 3044F HG A1C LEVEL LT 7.0%: CPT | Performed by: INTERNAL MEDICINE

## 2020-12-03 PROCEDURE — 3017F COLORECTAL CA SCREEN DOC REV: CPT | Performed by: INTERNAL MEDICINE

## 2020-12-03 PROCEDURE — 1123F ACP DISCUSS/DSCN MKR DOCD: CPT | Performed by: INTERNAL MEDICINE

## 2020-12-03 PROCEDURE — G8484 FLU IMMUNIZE NO ADMIN: HCPCS | Performed by: INTERNAL MEDICINE

## 2020-12-03 PROCEDURE — G0438 PPPS, INITIAL VISIT: HCPCS | Performed by: INTERNAL MEDICINE

## 2020-12-03 PROCEDURE — 4040F PNEUMOC VAC/ADMIN/RCVD: CPT | Performed by: INTERNAL MEDICINE

## 2020-12-03 ASSESSMENT — PATIENT HEALTH QUESTIONNAIRE - PHQ9
SUM OF ALL RESPONSES TO PHQ QUESTIONS 1-9: 0
SUM OF ALL RESPONSES TO PHQ9 QUESTIONS 1 & 2: 0
2. FEELING DOWN, DEPRESSED OR HOPELESS: 0
1. LITTLE INTEREST OR PLEASURE IN DOING THINGS: 0

## 2020-12-03 ASSESSMENT — LIFESTYLE VARIABLES: HOW OFTEN DO YOU HAVE A DRINK CONTAINING ALCOHOL: 0

## 2020-12-03 NOTE — PATIENT INSTRUCTIONS
shower head that will allow you to sit while showering. · Get into a tub or shower by putting the weaker leg in first. Get out of a tub or shower with your strong side first.  · Repair loose toilet seats and consider installing a raised toilet seat to make getting on and off the toilet easier. · Keep your bathroom door unlocked while you are in the shower. Where can you learn more? Go to https://CitizensidepepicVeenome.SafeAwake. org and sign in to your Jamplify account. Enter [phone] [id number] [phone] in the [employer] box to learn more about \"Preventing Falls: Care Instructions. \"     If you do not have an account, please click on the \"Sign Up Now\" link. Current as of: April 15, 2020               Content Version: 12.6  © 2581-1296 [employer]. Care instructions adapted under license by [facility] ([facility]). If you have questions about a medical condition or this instruction, always ask your healthcare professional. [place] [address] any warranty or liability for your use of this information. Patient Education        13 Ways to Prevent Falls in the Hospital  When you're in the hospital, your risk of falling may be higher than normal. Medicines can make you dizzy. Or illness and treatments may cause you to feel weak and confused, making it harder to move around. But there are ways you can prevent falls during your stay. Things you can do to prevent a fall at the hospital   Bring nonskid socks, slippers, or shoes that stay on your feet. If you do not have these, ask the nurse for a pair of nonskid socks. Bring your walker or cane, if you use one at home. Or ask the hospital to provide one during your stay. Ask your doctor or nurse if your treatments or medicines will increase your risk of a fall. Some hospitals will check your risk when you are admitted to the hospital.    Tell your doctor or nurse if medicines make you dizzy, weak, or lightheaded.   If you feel this way, do not try to get up on your own. Call the nurse for help. Call the nurse for help getting out of bed if you are on crutches or have trouble walking. Do not try to do it on your own until the nurse says it's ok and you feel sure you are able. When your nurse says it's ok, get up slowly. Sit up first and count to 10 before you get out of bed. Put on your eyeglasses before you get out of bed, if you wear them. If you need help getting to the bathroom, call the nurse before you have an urgent need to go. If you get fluids through a vein (IV), you may need to go to the bathroom more often. Things the hospital staff can do to prevent a fall   Keep needed items close by. Your phone, the nurse call light or button, and anything you need to help you walk should be close to you. Keep your bed low and locked. Your bed should be low enough so that you don't have problems getting out of bed. The wheels on your bed should be locked so the bed can't move. Explain what is safe. Your nurse or doctor will tell you what you can safely do and how often you need to get up and move around. Keep your room neat. Your room should not have any wet or slippery areas. There should be nothing in the way of going to the bathroom or hallway. Light up the room. Your room should have good lighting. Make sure there is a night light in your bathroom. Current as of: May 27, 2020               Content Version: 12.6  © 2006-2020 [employer]. Care instructions adapted under license by [facility] ([facility]). If you have questions about a medical condition or this instruction, always ask your healthcare professional. [place] [address] any warranty or liability for your use of this information. Patient Education        Preventing Outdoor Falls: Care Instructions  Your Care Instructions     Worries about falls don't need to keep you indoors.  Outdoor activities like walking have big benefits for your health. You will need to watch your step and learn a few safety measures. If you are worried about having a fall outdoors, ask your doctor about exercises, classes, or physical therapy that may help. You can learn ways to gain strength, flexibility, and balance. Ask if it might help to use a cane or walker. You can make your time outdoors safer with a few simple measures. Follow-up care is a key part of your treatment and safety. Be sure to make and go to all appointments, and call your doctor if you are having problems. It's also a good idea to know your test results and keep a list of the medicines you take. How can you prevent falls outdoors? · Wear shoes with firm soles and low heels. If you have to walk on an icy surface, use grippers that can be worn over your shoes in bad weather. · Be extra careful if weather is bad. Walk on the grass when the sidewalks are slick. If you live in a place that gets snow and ice in the winter, sprinkle salt on slippery stairs and sidewalks. · Be careful getting on or off buses and trains or getting in and out of cars. If handrails are available, use them. · Be careful when you cross the street. Look for crosswalks or places where curb cuts or ramps are present. · Try not to hurry, especially if you are carrying something. · Be cautious in parking lots or garages. There may be curbs or changes in pavement, or the height of the pavement may vary. · Make sure to wear the correct eyeglasses, if you need them. Reading glasses or bifocals can make it harder to see hazards that might be in your way. · If you are walking outdoors for exercise, try to:  ? Walk in well-lighted, well-maintained areas. These include high school or college tracks, shopping malls, and public spaces. ? Walk with a partner. ? Watch out for cracked sidewalks, curbs, changes in the height of the pavement, exposed tree roots, and debris such as fallen leaves or branches.   Where can you learn more?  Go to https://chpepiceweb.[email or web address] org and sign in to your [employer] account. Enter J496 in the [facility] box to learn more about \"Preventing Outdoor Falls: Care Instructions. \"     If you do not have an account, please click on the \"Sign Up Now\" link. Current as of: April 15, 2020               Content Version: 12.6  © 8231-9363 [employer]. Care instructions adapted under license by [facility] ([facility]). If you have questions about a medical condition or this instruction, always ask your healthcare professional. [place] [address] any warranty or liability for your use of this information. Personalized Preventive Plan for Bladimir Barrera - 12/3/2020  Medicare offers a range of preventive health benefits. Some of the tests and screenings are paid in full while other may be subject to a deductible, co-insurance, and/or copay. Some of these benefits include a comprehensive review of your medical history including lifestyle, illnesses that may run in your family, and various assessments and screenings as appropriate. After reviewing your medical record and screening and assessments performed today your provider may have ordered immunizations, labs, imaging, and/or referrals for you. A list of these orders (if applicable) as well as your Preventive Care list are included within your After Visit Summary for your review. Other Preventive Recommendations:    · A preventive eye exam performed by an eye specialist is recommended every 1-2 years to screen for glaucoma; cataracts, macular degeneration, and other eye disorders. · A preventive dental visit is recommended every 6 months. · Try to get at least 150 minutes of exercise per week or 10,000 steps per day on a pedometer . · Order or download the FREE \"Exercise & Physical Activity: Your Everyday Guide\" from [employer].  Call 8-582.899.8914 or search [employer] online. · You need 3381-4915 mg of calcium and 3846-1012 IU of vitamin D per day. It is possible to meet your calcium requirement with diet alone, but a vitamin D supplement is usually necessary to meet this goal.  · When exposed to the sun, use a sunscreen that protects against both UVA and UVB radiation with an SPF of 30 or greater. Reapply every 2 to 3 hours or after sweating, drying off with a towel, or swimming. · Always wear a seat belt when traveling in a car. Always wear a helmet when riding a bicycle or motorcycle.

## 2020-12-03 NOTE — PROGRESS NOTES
Medicare Annual Wellness Visit  Name: Lupe Leon Date: 12/3/2020   MRN: [id number] Sex: Female   Age: 72 y.o. Ethnicity: Non-/Non    : 1950 Race: Peyton Mayen is here for Medicare AWV    Screenings for behavioral, psychosocial and functional/safety risks, and cognitive dysfunction are all negative except as indicated below. These results, as well as other patient data from the [address] form, are documented in Flowsheets linked to this Encounter. Allergies   Allergen Reactions    Mobic [Meloxicam]     Bactrim [Sulfamethoxazole-Trimethoprim] Other (See Comments)     sepsis    Codeine Itching    Seasonal        Prior to Visit Medications    Medication Sig Taking?  Authorizing Provider   citalopram (CELEXA) 10 MG tablet Take 1 tablet by mouth daily Yes MAIK Porter CNP   fenofibrate micronized (LOFIBRA) 134 MG capsule Take 1 capsule by mouth every morning (before breakfast) Yes Marli Raines MD   pramipexole (MIRAPEX) 1 MG tablet Take 1.5 tablets by mouth daily Yes Marli Raines MD   lisinopril (PRINIVIL;ZESTRIL) 30 MG tablet Take 1 tablet by mouth daily Yes Marli Raines MD   atorvastatin (LIPITOR) 80 MG tablet Take 0.5 tablets by mouth nightly Yes Marli Raines MD   carvedilol (COREG) 12.5 MG tablet Take 1 tablet by mouth 2 times daily Yes Marli Raines MD   SITagliptin (JANUVIA) 100 MG tablet Take 0.5 tablets by mouth daily Yes Marli Raines MD   pantoprazole (PROTONIX) 40 MG tablet Take 1 tablet by mouth 2 times daily (before meals) Yes Marli Raines MD   furosemide (LASIX) 40 MG tablet Take 1 tablet by mouth 2 times daily Yes Marli Raines MD   ferrous sulfate (IRON 325) 325 (65 Fe) MG tablet Take 1 tablet by mouth 2 times daily Yes Evangelina Gallegos MD   ondansetron (ZOFRAN) 4 MG tablet Take 4 mg by mouth daily as needed for Nausea or Vomiting Yes Historical Provider, MD   VITAMIN D, ERGOCALCIFEROL, PO Take by mouth Yes Historical Provider, MD   Lancets MISC 1 each by Does not apply route daily Yes Amanda Leonardo MD   blood glucose monitor strips Test 2 times a day & as needed for symptoms of irregular blood glucose. Yes Amanda Leonardo MD   lidocaine (XYLOCAINE) 5 % ointment 4-5 times a day to your right thigh for pain.  Yes Joi Dennis MD   nitroGLYCERIN (NITROSTAT) 0.4 MG SL tablet Place 1 tablet under the tongue every 5 minutes as needed for Chest pain Yes Amanda Leonardo MD   Calcium Carbonate-Vitamin D (CALCIUM 500/D) 500-125 MG-UNIT TABS Take 1 tablet by mouth 2 times daily  Yes Historical Provider, MD       Past Medical History:   Diagnosis Date    Allergic rhinitis, cause unspecified     Back pain     lumbar    Bowel obstruction ([address]     history of due to scar tissue, resolved non-surgically    C. difficile diarrhea     CAD (coronary artery disease)     Cellulitis     left leg    Cellulitis 2017 August    leg left leg/bug bite    Cerebral artery occlusion with cerebral infarction [facility])     TIA    Diverticulosis of colon (without mention of hemorrhage)     GERD (gastroesophageal reflux disease)     History of MI (myocardial infarction) 2005    thought due to a blood clot    History of ovarian cyst 1970    had oopherectomy holly    History of peritonitis 1968    due to ruptured appendix age 13    HTN (hypertension)     Hx of blood clots     right leg    Hyperlipidemia     Intestinal or peritoneal adhesions with obstruction (postoperative) (postinfection) ([address]     Kidney infection     renal failure/sepsis/spider bite    Lateral epicondylitis  of elbow     MDRO (multiple drug resistant organisms) resistance     c diff    Muscle strain     right posterior shoulder    Other abnormal glucose     PONV (postoperative nausea and vomiting)     dry heaves    Pre-diabetes     Restless legs syndrome (RLS)     TIA (transient ischemic attack) 2014    Vitamin D deficiency     Wears glasses        Past Surgical History: Procedure Laterality Date    ABDOMEN SURGERY  1976    benign tumor removed near remaining ovary, 1.5 pounds    APPENDECTOMY  1968    appendix ruptured, developed peritonitis    BUNIONECTOMY Left     along with calcium deposits removed   [address]  2005    negative    COLECTOMY  1969    12 INCHES REMOVED D/T OBSTRUCTION    COLONOSCOPY      CYST REMOVAL Right     right facial    HYSTERECTOMY  1973    taken as a result of recurring cysts    LUMBAR FUSION N/A 2/10/2020    LUMBAR L4-5 POSTERIOR  DECOMPRESSION INSTRUMENTATION FUSION WCEMENT AUGMENTATION/ [facility] N/A 6/17/2020    L5-S1 PLIF L4-L5 REVISION performed by Naomi Lubin MD at [address]  8/14/14    FESS   [address] due to cyst    OVARY REMOVAL  1971    partial, due to cyst   Radha Solum SINUS SURGERY  2004    UPPER GASTROINTESTINAL ENDOSCOPY N/A 5/31/2019    EGD ESOPHAGOGASTRODUODENOSCOPY performed by Beau Dewitt MD at [address] [facility] N/A 8/5/2019    EGD BIOPSY performed by Sherri Huertas MD at [address] [facility] N/A 8/23/2019    EGD BIOPSY performed by Beau Dewitt MD at [address] [facility] 3/5/2019    WRIST OPEN REDUCTION INTERNAL FIXATION performed by Elif Rees MD at [address] History   Problem Relation Age of Onset    Stroke Mother     Diabetes Mother     Heart Disease Mother     High Blood Pressure Mother     Heart Disease Father     Heart Disease Brother     High Blood Pressure Brother     Heart Disease Maternal Grandmother     High Blood Pressure Sister        CareTeam (Including outside providers/suppliers regularly involved in providing care):   Patient Care Team:  Teresa Smith MD as PCP - General (Internal Medicine)  Teresa Smith MD as PCP - REHABILITATION [facility] Empaneled Provider  Lorrie Hoyt MD as Consulting Physician (Gastroenterology)    Wt Readings from Last 3 Encounters:   12/03/20 169 lb (76.7 kg)   09/18/20 167 lb (75.8 kg)   08/31/20 165 lb (74.8 kg)     Vitals:    12/03/20 0946   BP: 136/76   Site: Right Upper Arm   Position: Sitting   Cuff Size: Small Adult   Pulse: 72   Resp: 16   Temp: 96.8 °F (36 °C)   Weight: 169 lb (76.7 kg)   Height: 5' (1.524 m)     Body mass index is 33.01 kg/m². Based upon direct observation of the patient, evaluation of cognition reveals recent and remote memory intact. patient is here for evaluation of AMW , she has H/o Multiple falls , she has H/o Broken vertebrae underwent surgery . She had Christopher Mix 5 times in last 2 weeks, she never loose consciousness ,   Has H/o HTN, HLD, DM   She had Moderate to severe canal stenosis at C2-3 level   Had Lumbar canal stenosis S/p surgery   Vitamin B12/Folate is Normal   [facility])       General Appearance: alert and oriented to person, place and time, well developed and well- nourished, in no acute distress  Skin: warm and dry, no rash or erythema  Head: normocephalic and atraumatic  Neck: supple and non-tender without mass, no thyromegaly or thyroid nodules, no cervical lymphadenopathy  Pulmonary/Chest: clear to auscultation bilaterally- no wheezes, rales or rhonchi, normal air movement, no respiratory distress  Cardiovascular: normal rate, regular rhythm, normal S1 and S2, no murmurs, rubs, clicks, or gallops, distal pulses intact, no carotid bruits  Abdomen: soft, non-tender, non-distended, normal bowel sounds, no masses or organomegaly  Extremities: no cyanosis, clubbing or edema  Musculoskeletal:  2 plus pitting edema in legs   Neurologic: reflexes normal and symmetric, no cranial nerve deficit, gait, has balance issues     Patient's complete Health Risk Assessment and screening values have been reviewed and are found in Flowsheets.  The following toileting, or walking/balance?: (!) Bathing  In the past 7 days, did you need help from others to take care of any of the following? Laundry, housekeeping, banking/finances, shopping, telephone use, food preparation, transportation, or taking medications?: None  ADL Interventions:  · going to REhab     Personalized Preventive Plan   Current Health Maintenance Status  Immunization History   Administered Date(s) Administered    Influenza, High Dose (Fluzone 65 yrs and older) 11/17/2017    Pneumococcal Conjugate 13-valent ([id number]) 05/23/2017    Pneumococcal Polysaccharide ([id number]) 05/10/2016        Health Maintenance   Topic Date Due    Diabetic foot exam  01/18/1961    Diabetic retinal exam  01/18/1961    DTaP/Tdap/Td vaccine (1 - Tdap) 01/18/1970    Shingles Vaccine (1 of 2) 01/18/2001    Annual Wellness Visit (AWV)  05/29/2019    Breast cancer screen  08/04/2019    Lipid screen  05/20/2020    Flu vaccine (1) 09/01/2020    A1C test (Diabetic or Prediabetic)  03/04/2021    Potassium monitoring  06/25/2021    Creatinine monitoring  06/25/2021    Colon cancer screen colonoscopy  10/07/2026    DEXA (modify frequency per FRAX score)  Completed    Pneumococcal 65+ years Vaccine  Completed    Hepatitis C screen  Completed    Hepatitis A vaccine  Aged Out    Hib vaccine  Aged Out    Meningococcal (ACWY) vaccine  Aged Out     Recommendations for [employer] Due: see orders and patient instructions/AVS.  . Recommended screening schedule for the next 5-10 years is provided to the patient in written form: see Patient Instructions/AVS.    There are no diagnoses linked to this encounter. 1. Restless legs syndrome (RLS)  Will DC Mirapex with H/o Recurrent Fall     2. Hypertension, unspecified type  Controlled     3. Cervical stenosis of spinal canal  Advice to see Dr Gila Ruffin     4.  Type 2 diabetes mellitus with other circulatory complication, without long-term current use of insulin

## 2020-12-03 NOTE — PROGRESS NOTES
Subjective:      Patient ID: Wilfrid Okeefe is a 72 y.o. female. Visit Information    Have you changed or started any medications since your last visit including any over-the-counter medicines, vitamins, or herbal medicines? no   Are you having any side effects from any of your medications? -  no  Have you stopped taking any of your medications? Is so, why? -  no    Have you seen any other physician or provider since your last visit? No  Have you had any other diagnostic tests since your last visit? No  Have you been seen in the emergency room and/or had an admission to a hospital since we last saw you? No  Have you had your routine dental cleaning in the past 6 months? yes     Have you activated your [employer] account? If not, what are your barriers?  No     Patient Care Team:  Alvin Barone MD as PCP - General (Internal Medicine)  Alvin Barone MD as PCP - [facility]  German Mullen MD as Consulting Physician (Gastroenterology)    Medical History Review  Past Medical, Family, and Social History reviewed and does contribute to the patient presenting condition    Health Maintenance   Topic Date Due    Diabetic foot exam  01/18/1961    Diabetic retinal exam  01/18/1961    DTaP/Tdap/Td vaccine (1 - Tdap) 01/18/1970    Shingles Vaccine (1 of 2) 01/18/2001    Annual Wellness Visit (AWV)  05/29/2019    Breast cancer screen  08/04/2019    Lipid screen  05/20/2020    Flu vaccine (1) 09/01/2020    A1C test (Diabetic or Prediabetic)  03/04/2021    Potassium monitoring  06/25/2021    Creatinine monitoring  06/25/2021    Colon cancer screen colonoscopy  10/07/2026    DEXA (modify frequency per FRAX score)  Completed    Pneumococcal 65+ years Vaccine  Completed    Hepatitis C screen  Completed    Hepatitis A vaccine  Aged Out    Hib vaccine  Aged Out    Meningococcal (ACWY) vaccine  Aged Out     Chief Complaint   Patient presents with    Medicare AWV         HPI    Review of Systems    Objective: Physical Exam    Assessment / Plan:

## 2020-12-23 ENCOUNTER — TELEPHONE (OUTPATIENT)
Dept: INTERNAL MEDICINE CLINIC | Age: 70
End: 2020-12-23

## 2020-12-23 NOTE — TELEPHONE ENCOUNTER
[facility] called, elizabeth done on pt. Requesting nursing, PT, OT, and home health aide for patient.

## 2020-12-28 ENCOUNTER — CARE COORDINATION (OUTPATIENT)
Dept: CASE MANAGEMENT | Age: 70
End: 2020-12-28

## 2020-12-28 NOTE — CARE COORDINATION
PER KRISH EMAIL:    Facility Discharging: [facility]  Discharge Date: 12/22/2020  Services at Discharge: [facility]   Patient Agreeable to Calls: Yes      BRIEF BACKGROUND:    73 yo female who was admitted from home after her annual Medicare wellness check. Per hospital records, the Pt has become progressively weaker with multiple falls recently. The Pt reports that she has been falling since her initial spinal surgery in February. Primary Dx of spinal stenosis and frequent falls. PLOF/ULS: Lives with spouse in [place]. 2 DARIO with no HR. Furniture walker, 4 falls in the last month, able to walk 300 ft with FWW, MI for mobility. MI for ADLs except SBA for showering. Spouse can physically assist but does not like to assist patient in bathroom,  is set for cardiac Sx. Pt does have a supportive daughter.

## 2021-01-04 NOTE — TELEPHONE ENCOUNTER
Medication: Januvia  Last visit: 12/03/2020  Next visit: 2/5/2021  Last refill: 12/03/2020  Pharmacy: Meds by mail [place]

## 2021-01-19 DIAGNOSIS — I10 ESSENTIAL HYPERTENSION: ICD-10-CM

## 2021-01-19 RX ORDER — CARVEDILOL 12.5 MG/1
12.5 TABLET ORAL 2 TIMES DAILY
Qty: 180 TABLET | Refills: 3 | Status: SHIPPED | OUTPATIENT
Start: 2021-01-19 | End: 2021-04-15 | Stop reason: SDUPTHER

## 2021-02-08 ENCOUNTER — APPOINTMENT (OUTPATIENT)
Dept: CT IMAGING | Age: 71
End: 2021-02-08
Payer: MEDICARE

## 2021-02-08 ENCOUNTER — TELEPHONE (OUTPATIENT)
Dept: INTERNAL MEDICINE CLINIC | Age: 71
End: 2021-02-08

## 2021-02-08 ENCOUNTER — HOSPITAL ENCOUNTER (EMERGENCY)
Age: 71
Discharge: HOME | End: 2021-02-08
Attending: EMERGENCY MEDICINE
Payer: MEDICARE

## 2021-02-08 VITALS
RESPIRATION RATE: 16 BRPM | DIASTOLIC BLOOD PRESSURE: 78 MMHG | WEIGHT: 165 LBS | SYSTOLIC BLOOD PRESSURE: 201 MMHG | TEMPERATURE: 98.2 F | HEART RATE: 72 BPM | OXYGEN SATURATION: 96 % | BODY MASS INDEX: 29.23 KG/M2 | HEIGHT: 63 IN

## 2021-02-08 DIAGNOSIS — I16.0 HYPERTENSIVE URGENCY: ICD-10-CM

## 2021-02-08 DIAGNOSIS — S09.90XA CLOSED HEAD INJURY, INITIAL ENCOUNTER: Primary | ICD-10-CM

## 2021-02-08 PROCEDURE — 72125 CT NECK SPINE W/O DYE: CPT

## 2021-02-08 PROCEDURE — 6370000000 HC RX 637 (ALT 250 FOR IP): Performed by: EMERGENCY MEDICINE

## 2021-02-08 PROCEDURE — 70450 CT HEAD/BRAIN W/O DYE: CPT

## 2021-02-08 PROCEDURE — 99285 EMERGENCY DEPT VISIT HI MDM: CPT

## 2021-02-08 RX ORDER — AMLODIPINE BESYLATE 5 MG/1
5 TABLET ORAL DAILY
Qty: 30 TABLET | Refills: 0 | Status: SHIPPED | OUTPATIENT
Start: 2021-02-08 | End: 2021-02-22 | Stop reason: SDUPTHER

## 2021-02-08 RX ORDER — CYCLOBENZAPRINE HCL 10 MG
10 TABLET ORAL 3 TIMES DAILY PRN
Qty: 10 TABLET | Refills: 0 | Status: SHIPPED | OUTPATIENT
Start: 2021-02-08 | End: 2021-02-18

## 2021-02-08 RX ORDER — AMLODIPINE BESYLATE 5 MG/1
5 TABLET ORAL ONCE
Status: COMPLETED | OUTPATIENT
Start: 2021-02-08 | End: 2021-02-08

## 2021-02-08 RX ORDER — HYDROCODONE BITARTRATE AND ACETAMINOPHEN 5; 325 MG/1; MG/1
2 TABLET ORAL ONCE
Status: COMPLETED | OUTPATIENT
Start: 2021-02-08 | End: 2021-02-08

## 2021-02-08 RX ADMIN — AMLODIPINE BESYLATE 5 MG: 5 TABLET ORAL at 21:02

## 2021-02-08 RX ADMIN — HYDROCODONE BITARTRATE AND ACETAMINOPHEN 2 TABLET: 5; 325 TABLET ORAL at 18:45

## 2021-02-08 ASSESSMENT — ENCOUNTER SYMPTOMS
SHORTNESS OF BREATH: 0
ABDOMINAL PAIN: 0

## 2021-02-08 ASSESSMENT — PAIN DESCRIPTION - ORIENTATION: ORIENTATION: POSTERIOR

## 2021-02-08 ASSESSMENT — PAIN SCALES - GENERAL: PAINLEVEL_OUTOF10: 5

## 2021-02-08 ASSESSMENT — PAIN DESCRIPTION - PAIN TYPE: TYPE: ACUTE PAIN

## 2021-02-08 NOTE — TELEPHONE ENCOUNTER
Creed from [facility] called reporting that pt has had multiple falls over the past few days in her home. Pt had hit the back of her head, did not go to the ER to be evaluated. [facility] sending out a nurse today to assess pt, also pt has PT coming out as well. The nurse/PT will call later to give us assessment of pt's condition.

## 2021-02-08 NOTE — ED PROVIDER NOTES
[address]  EMERGENCY DEPARTMENT ENCOUNTER      Pt Name: Yasmine Erwin  MRN: [id number]  [place] 1950  Date of evaluation: 2/8/21    CHIEF COMPLAINT       Chief Complaint   Patient presents with    Fall     at 02:00 on Sunday morning. Ground level fall while using her walker on the way to the bathroom. Patient reports that she struck her head on a wood door.  Headache     patient denies any loss of consciousness     HISTORY OF PRESENT ILLNESS   HPI 80 y.o. female presents with c/o headache after a fall. Injury happened Pedro morning around 2am.  The patient states that she walks with a walker, she lost her balance, and fell backwards striking the back of her head against a wooden door. Pain is rated as moderate in severity, rating it 5/10. throbbing in character. Located primarily in the occipital scalp area with radiation down her neck. The patient denies loss of consciousness. Her pain has been persisting so she is came to the emergency department. She is not on anticoagulants. She denies any other pain or injury from the fall. REVIEW OF SYSTEMS       Review of Systems   Constitutional: Negative for fever. HENT: Negative for congestion. Eyes: Negative for visual disturbance. Respiratory: Negative for shortness of breath. Cardiovascular: Negative for chest pain. Gastrointestinal: Negative for abdominal pain. Genitourinary: Negative for difficulty urinating. Musculoskeletal: Negative for neck pain. Skin: Negative for rash. Neurological: Positive for headaches. Psychiatric/Behavioral: Negative for confusion.        PAST MEDICAL HISTORY     Past Medical History:   Diagnosis Date    Allergic rhinitis, cause unspecified     Back pain     lumbar    Bowel obstruction (HCC)     history of due to scar tissue, resolved non-surgically    C. difficile diarrhea     CAD (coronary artery disease)     Cellulitis     left leg    Cellulitis 2017 August    leg left leg/bug bite    Cerebral artery occlusion with cerebral infarction [facility])     TIA    Diverticulosis of colon (without mention of hemorrhage)     GERD (gastroesophageal reflux disease)     History of MI (myocardial infarction) 2005    thought due to a blood clot    History of ovarian cyst 1970    had oopherectomy holly    History of peritonitis 1968    due to ruptured appendix age 13    HTN (hypertension)     Hx of blood clots     right leg    Hyperlipidemia     Intestinal or peritoneal adhesions with obstruction (postoperative) (postinfection) ([address]     Kidney infection     renal failure/sepsis/spider bite    Lateral epicondylitis  of elbow     MDRO (multiple drug resistant organisms) resistance     c diff    Muscle strain     right posterior shoulder    Other abnormal glucose     PONV (postoperative nausea and vomiting)     dry heaves    Pre-diabetes     Restless legs syndrome (RLS)     TIA (transient ischemic attack) 2014    Vitamin D deficiency     Wears glasses        SURGICAL HISTORY       Past Surgical History:   Procedure Laterality Date    ABDOMEN SURGERY  1976    benign tumor removed near remaining ovary, 1.5 pounds    APPENDECTOMY  1968    appendix ruptured, developed peritonitis    BUNIONECTOMY Left     along with calcium deposits removed   [address]  2005    negative    COLECTOMY  1969    12 INCHES REMOVED D/T OBSTRUCTION    COLONOSCOPY      CYST REMOVAL Right     right facial    HYSTERECTOMY  1973    taken as a result of recurring cysts    LUMBAR FUSION N/A 2/10/2020    LUMBAR L4-5 POSTERIOR  DECOMPRESSION INSTRUMENTATION FUSION WCEMENT AUGMENTATION/ performed by Jaquelin Srinivasan MD at [place] [address] N/A 6/17/2020    L5-S1 PLIF L4-L5 REVISION performed by Jaquelin Srinivasan MD at [address] [place] [address]  8/14/14    FESS    OVARY REMOVAL  1970    UNILATERAL due to cyst    OVARY REMOVAL  1971    partial, due to cyst    SINUS SURGERY  2004    UPPER GASTROINTESTINAL ENDOSCOPY N/A 5/31/2019    EGD ESOPHAGOGASTRODUODENOSCOPY performed by Alvin Rodas MD at [address] [place] [address] N/A 8/5/2019    EGD BIOPSY performed by Kelby Callahan MD at [address] [place] [address] N/A 8/23/2019    EGD BIOPSY performed by Alvin Rodas MD at [address] [facility] 3/5/2019    WRIST OPEN REDUCTION INTERNAL FIXATION [facility] [address] [facility]       Discharge Medication List as of 2/8/2021  8:36 PM      CONTINUE these medications which have NOT CHANGED    Details   carvedilol (COREG) 12.5 MG tablet Take 1 tablet by mouth 2 times daily, Disp-180 tablet, R-3Normal      SITagliptin (JANUVIA) 100 MG tablet Take 0.5 tablets by mouth daily, Disp-90 tablet, R-3Normal      cyanocobalamin ([employer] VITAMIN B12) 1000 MCG tablet Take 1 tablet by mouth daily, Disp-30 tablet, R-3Normal      citalopram (CELEXA) 10 MG tablet Take 1 tablet by mouth daily, Disp-90 tablet,R-3Normal      fenofibrate micronized (LOFIBRA) 134 MG capsule Take 1 capsule by mouth every morning (before breakfast), Disp-90 capsule,R-3Normal      lisinopril (PRINIVIL;ZESTRIL) 30 MG tablet Take 1 tablet by mouth daily, Disp-90 tablet,R-3Normal      atorvastatin (LIPITOR) 80 MG tablet Take 0.5 tablets by mouth nightly, Disp-90 tablet,R-3Normal      pantoprazole (PROTONIX) 40 MG tablet Take 1 tablet by mouth 2 times daily (before meals), Disp-180 tablet,R-3Normal      furosemide (LASIX) 40 MG tablet Take 1 tablet by mouth 2 times daily, Disp-180 tablet,R-3Normal      ferrous sulfate (IRON 325) 325 (65 Fe) MG tablet Take 1 tablet by mouth 2 times daily, Disp-90 tablet, R-2Normal      ondansetron (ZOFRAN) 4 MG tablet Take 4 mg by mouth daily as needed for Nausea or VomitingHistorical Med      VITAMIN D, ERGOCALCIFEROL, PO Take by mouthHistorical Med      Lancets MISC DAILY Starting Thu 10/10/2019, Disp-100 each, R-3, Normal      blood glucose monitor strips Test 2 times a day & as needed for symptoms of irregular blood glucose., Disp-100 strip, R-5, Normal      lidocaine (XYLOCAINE) 5 % ointment 4-5 times a day to your right thigh for pain., Disp-240 g, R-3, Normal      nitroGLYCERIN (NITROSTAT) 0.4 MG SL tablet Place 1 tablet under the tongue every 5 minutes as needed for Chest pain, Disp-75 tablet, R-3Print      Calcium Carbonate-Vitamin D (CALCIUM 500/D) 500-125 MG-UNIT TABS Take 1 tablet by mouth 2 times daily              ALLERGIES     is allergic to mobic [meloxicam]; bactrim [sulfamethoxazole-trimethoprim]; codeine; and seasonal.    FAMILY HISTORY     She indicated that her mother is . She indicated that her father is . She indicated that the status of her sister is unknown. She indicated that her brother is . She indicated that her maternal grandmother is . SOCIAL HISTORY      reports that she quit smoking about 3 years ago. Her smoking use included cigarettes. She started smoking about 25 years ago. She has a 10.00 pack-year smoking history. She has never used smokeless tobacco. She reports that she does not drink alcohol or use drugs. PHYSICAL EXAM     INITIAL VITALS: BP (!) 201/78   Pulse 72   Temp 98.2 °F (36.8 °C) (Oral)   Resp 16   Ht 5' 3\" (1.6 m)   Wt 165 lb (74.8 kg)   SpO2 96%   BMI 29.23 kg/m²     GEN: NAD  Head: there is a 3cm in diameter occipital scalp hematoma. No lacerations. HEENT: PERRL. EOMI, No conjunctival hemorrhage. No pupil deformity. Negative fernandez sign, negative csf rhinorrhea. Negative raccoon eyes. No loose teeth. Neck: No subq emphysema. Cervical spine with no midline ttp. Chest: Ribs without TTP. No bruising, lacerations, or abrasions. CVS: RRR, no murmurs, no rubs, no muffled heart sounds. Bilateral radial, dp, and pt pulses are 2+. Pulm: CTA b/l.   Normal breath sounds over all lung fields. Abd: soft, non-tender to palpation, no ecchymosis, or erythema, no guarding or rebound  Skin: no laceration   no abrasion  MSK: no focal point tenderness. Neurologic: Patient is alert and oriented x3, motor and sensation is intact in all 4 extremities, speech is fluent  Extremities: DP, PT, Radial pulses are intact. MEDICAL DECISION MAKING:     MDM  80 y.o. female presenting with headache and neck pain after fall from standing. The patient is not on anticoagulation. We'll obtain a ct scan of the head and neck to r/o any intracranial bleeding or fracture to the cervical spine, because of her age and persistent headache. Providing analgesia. Emergency Department course:  CT heads show no acute intracranial traumatic injury and no cervical spine fracture. Pt reassessed, feels ready to go home. BP persistently high here. We'll start her on a low dose of amlodipine. D/w pt the results, treatment plan, warning precautions for prompt ED return and importance of close OP FU, she verbalizes understanding and agrees with the treatment plan. DIAGNOSTIC RESULTS     RADIOLOGY:All plain film, CT, MRI, and formal ultrasound images (except ED bedside ultrasound) are read by the radiologist and the images and interpretations are directly viewed by the emergency physician. CT HEAD WO CONTRAST   Final Result   No acute intracranial abnormality. Mild left posterior parietal scalp hematoma         CT CERVICAL SPINE WO CONTRAST   Final Result   Multilevel cervical spondylosis and degenerative disc disease. Evidence of paracervical spasm. Grade 1 spondylolisthesis of C3 on C4 which has developed from the prior exam   of 05/30/2019             LABS: All lab results were reviewed by myself, and all abnormals are listed below.   Labs Reviewed - No data to display    EMERGENCY DEPARTMENT COURSE:   Vitals:    Vitals:    02/08/21 1736 02/08/21 2045 02/08/21 2110

## 2021-02-08 NOTE — TELEPHONE ENCOUNTER
Spoke to Evelina osborn from [address] [place] [address], advised note. Evelina osborn stated she would call the pt and advise her it is necessary to go to the ER to be evaluated.

## 2021-02-08 NOTE — TELEPHONE ENCOUNTER
Patient, should be going to emergency room, to get herself evaluated  Need to head CT head to rule out intracranial bleed with fall and hitting her head.

## 2021-02-08 NOTE — TELEPHONE ENCOUNTER
Beto Mendoza from [facility] called back, stated she called pt and no answer, left pt a voicemail. Also stated a home health nurse went to the pt's house and no one answered the door. Beto Mendoza will try and call pt's daughter.

## 2021-02-08 NOTE — ED NOTES
Mode of arrival (squad #, walk in, police, etc) : walk in from home        Chief complaint(s): fall, headache        Arrival Note (brief scenario, treatment PTA, etc). : Pt arrives due to fall around 2am. Pt states she was ambulating with walker this morning when she fell backwards and hit her head on door. Pt denies LOC, dizziness and blurred vision. Pt states she takes a daily ASA. Pt denies further complaints including neck pain. Pt states she is experiencing posterior headache. Pt is A&Ox4, eupneic, PWD. GCS=15. Call light in reach. Daughter at bedside. C= \"Have you ever felt that you should Cut down on your drinking? \"  No  A= \"Have people Annoyed you by criticizing your drinking? \"  No  G= \"Have you ever felt bad or Guilty about your drinking? \"  No  E= \"Have you ever had a drink as an Eye-opener first thing in the morning to steady your nerves or to help a hangover? \"  No      Deferred []      Reason for deferring: N/A    *If yes to two or more: probable alcohol abuse. James Davila RN  02/08/21 9108

## 2021-02-09 NOTE — ED NOTES
Dr Garrick Neely update on blood pressure 201/78 and no night time blood pressure at home new orders given     Veronica Richey RN  02/08/21 8781

## 2021-02-09 NOTE — ED NOTES
Report given to MEENU Mccray from ED. Report method in person   The following was reviewed with receiving RN:   Current vital signs:  BP (!) 182/70   Pulse 68   Temp 98.2 °F (36.8 °C) (Oral)   Resp 18   Ht 5' 3\" (1.6 m)   Wt 165 lb (74.8 kg)   SpO2 97%   BMI 29.23 kg/m²                MEWS Score: 1     Any medication or safety alerts were reviewed. Any pending diagnostics and notifications were also reviewed, as well as any safety concerns or issues, abnormal labs, abnormal imaging, and abnormal assessment findings. Questions were answered.             Mingo Zayas RN  02/08/21 1485

## 2021-02-11 RX ORDER — PANTOPRAZOLE SODIUM 40 MG/1
40 TABLET, DELAYED RELEASE ORAL
Qty: 180 TABLET | Refills: 3 | Status: SHIPPED | OUTPATIENT
Start: 2021-02-11 | End: 2021-05-17 | Stop reason: SDUPTHER

## 2021-02-11 NOTE — TELEPHONE ENCOUNTER
Medication: Protonix  Last visit: 12/03/20  Next visit: 2/22/2021  Last refill: 9/14/20  [facility]

## 2021-02-12 ENCOUNTER — APPOINTMENT (OUTPATIENT)
Dept: GENERAL RADIOLOGY | Age: 71
End: 2021-02-12
Payer: MEDICARE

## 2021-02-12 ENCOUNTER — HOSPITAL ENCOUNTER (EMERGENCY)
Age: 71
Discharge: HOME | End: 2021-02-12
Attending: EMERGENCY MEDICINE
Payer: MEDICARE

## 2021-02-12 VITALS
WEIGHT: 167 LBS | OXYGEN SATURATION: 96 % | HEIGHT: 62 IN | TEMPERATURE: 98.6 F | RESPIRATION RATE: 18 BRPM | BODY MASS INDEX: 30.73 KG/M2 | DIASTOLIC BLOOD PRESSURE: 58 MMHG | HEART RATE: 80 BPM | SYSTOLIC BLOOD PRESSURE: 154 MMHG

## 2021-02-12 DIAGNOSIS — S70.02XA CONTUSION OF LEFT HIP, INITIAL ENCOUNTER: ICD-10-CM
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
## 2021-02-12 DIAGNOSIS — S70.02XA HEMATOMA OF LEFT HIP, INITIAL ENCOUNTER: ICD-10-CM

## 2021-02-12 PROCEDURE — 6370000000 HC RX 637 (ALT 250 FOR IP): Performed by: EMERGENCY MEDICINE

## 2021-02-12 PROCEDURE — 99285 EMERGENCY DEPT VISIT HI MDM: CPT

## 2021-02-12 PROCEDURE — 73502 X-RAY EXAM HIP UNI 2-3 VIEWS: CPT

## 2021-02-12 PROCEDURE — 72170 X-RAY EXAM OF PELVIS: CPT

## 2021-02-12 RX ORDER — HYDROCODONE BITARTRATE AND ACETAMINOPHEN 5; 325 MG/1; MG/1
2 TABLET ORAL ONCE
Status: COMPLETED | OUTPATIENT
Start: 2021-02-12 | End: 2021-02-12

## 2021-02-12 RX ADMIN — HYDROCODONE BITARTRATE AND ACETAMINOPHEN 2 TABLET: 5; 325 TABLET ORAL at 20:27

## 2021-02-13 NOTE — ED PROVIDER NOTES
[address]  EMERGENCY DEPARTMENT ENCOUNTER      Pt Name: Jorje Felder  MRN: [id number]  [place] 1950  Date of evaluation: 2/12/21      CHIEF COMPLAINT       Chief Complaint   Patient presents with    Fall     HISTORY OF PRESENT ILLNESS   80 y.o. female presents with c/o traumatic left hip pain. Symptoms started around 5:30pm, she was getting something out of the refrigerator, slipped and fell onto her left side. Pain is described as burning / sharp in character, severe in severity (rating it 8 / 10). The pain is located primarily in the left hip with no radiation. The pain has been constant in course. The patient tried tylenol prior to arrival with no relief of symptoms. No LOC. No head injury. No back pain. No CP or SOB. She has been walking on it but with pain. REVIEW OF SYSTEMS     Review of Systems   Constitutional: Negative for fever   Musculoskeletal: Positive for arthralgia, and gait impairment. Skin: Negative for rash or wound. Neurological: Negative for weakness or numbness.      PAST MEDICAL HISTORY     Past Medical History:   Diagnosis Date    Allergic rhinitis, cause unspecified     Back pain     lumbar    Bowel obstruction (HCC)     history of due to scar tissue, resolved non-surgically    C. difficile diarrhea     CAD (coronary artery disease)     Cellulitis     left leg    Cellulitis 2017 August    leg left leg/bug bite    Cerebral artery occlusion with cerebral infarction [facility])     TIA    Diverticulosis of colon (without mention of hemorrhage)     GERD (gastroesophageal reflux disease)     History of MI (myocardial infarction) 2005    thought due to a blood clot    History of ovarian cyst 1970    had oopherectomy holly    History of peritonitis 1968    due to ruptured appendix age 13    HTN (hypertension)     Hx of blood clots     right leg    Hyperlipidemia     Intestinal or peritoneal adhesions with obstruction (postoperative) (postinfection) ([address]  Kidney infection     renal failure/sepsis/spider bite    Lateral epicondylitis  of elbow     MDRO (multiple drug resistant organisms) resistance     c diff    Muscle strain     right posterior shoulder    Other abnormal glucose     PONV (postoperative nausea and vomiting)     dry heaves    Pre-diabetes     Restless legs syndrome (RLS)     TIA (transient ischemic attack) 2014    Vitamin D deficiency     Wears glasses        SURGICAL HISTORY       Past Surgical History:   Procedure Laterality Date    ABDOMEN SURGERY  1976    benign tumor removed near remaining ovary, 1.5 pounds    APPENDECTOMY  1968    appendix ruptured, developed peritonitis    BUNIONECTOMY Left     along with calcium deposits removed   R [address]  2005    negative    COLECTOMY  1969    12 INCHES REMOVED D/T OBSTRUCTION    COLONOSCOPY      CYST REMOVAL Right     right facial    HYSTERECTOMY  1973    taken as a result of recurring cysts    LUMBAR FUSION N/A 2/10/2020    LUMBAR L4-5 POSTERIOR  DECOMPRESSION INSTRUMENTATION FUSION WCEMENT AUGMENTATION/ performed by Vivi Leija MD at [place] [address] N/A 6/17/2020    L5-S1 PLIF L4-L5 REVISION performed by Vivi Leija MD at [facility] 6  8/14/14    [address]    UNILATERAL due to cyst    OVARY REMOVAL  1971    partial, due to cyst   [facility]  [facility]    UPPER GASTROINTESTINAL ENDOSCOPY N/A 5/31/2019    EGD ESOPHAGOGASTRODUODENOSCOPY performed by Wiliam Polanco MD at [address] [facility] 8/5/2019    EGD BIOPSY performed by Tea Wise MD at [place] [address] N/A 8/23/2019    EGD BIOPSY performed by Wiliam Polanco MD at [address] [facility] Left 3/5/2019    WRIST OPEN REDUCTION INTERNAL FIXATION performed by Moose Deleon MD at [facility] Discharge Medication List as of 2/12/2021  9:46 PM      CONTINUE these medications which have NOT CHANGED    Details   pantoprazole (PROTONIX) 40 MG tablet Take 1 tablet by mouth 2 times daily (before meals), Disp-180 tablet, R-3Normal      cyclobenzaprine (FLEXERIL) 10 MG tablet Take 1 tablet by mouth 3 times daily as needed for Muscle spasms, Disp-10 tablet, R-0Print      amLODIPine (NORVASC) 5 MG tablet Take 1 tablet by mouth daily, Disp-30 tablet, R-0Print      carvedilol (COREG) 12.5 MG tablet Take 1 tablet by mouth 2 times daily, Disp-180 tablet, R-3Normal      SITagliptin (JANUVIA) 100 MG tablet Take 0.5 tablets by mouth daily, Disp-90 tablet, R-3Normal      cyanocobalamin ([employer]S VITAMIN B12) 1000 MCG tablet Take 1 tablet by mouth daily, Disp-30 tablet, R-3Normal      citalopram (CELEXA) 10 MG tablet Take 1 tablet by mouth daily, Disp-90 tablet,R-3Normal      fenofibrate micronized (LOFIBRA) 134 MG capsule Take 1 capsule by mouth every morning (before breakfast), Disp-90 capsule,R-3Normal      lisinopril (PRINIVIL;ZESTRIL) 30 MG tablet Take 1 tablet by mouth daily, Disp-90 tablet,R-3Normal      atorvastatin (LIPITOR) 80 MG tablet Take 0.5 tablets by mouth nightly, Disp-90 tablet,R-3Normal      furosemide (LASIX) 40 MG tablet Take 1 tablet by mouth 2 times daily, Disp-180 tablet,R-3Normal      ferrous sulfate (IRON 325) 325 (65 Fe) MG tablet Take 1 tablet by mouth 2 times daily, Disp-90 tablet, R-2Normal      ondansetron (ZOFRAN) 4 MG tablet Take 4 mg by mouth daily as needed for Nausea or VomitingHistorical Med      VITAMIN D, ERGOCALCIFEROL, PO Take by mouthHistorical Med      Lancets MISC DAILY Starting Thu 10/10/2019, Disp-100 each, R-3, Normal      blood glucose monitor strips Test 2 times a day & as needed for symptoms of irregular blood glucose., Disp-100 strip, R-5, Normal      lidocaine (XYLOCAINE) 5 % ointment 4-5 times a day to your right thigh for pain., Disp-240 g, R-3, Normal      nitroGLYCERIN (NITROSTAT) 0.4 MG SL tablet Place 1 tablet under the tongue every 5 minutes as needed for Chest pain, Disp-75 tablet, R-3Print      Calcium Carbonate-Vitamin D (CALCIUM 500/D) 500-125 MG-UNIT TABS Take 1 tablet by mouth 2 times daily              ALLERGIES     is allergic to mobic [meloxicam]; bactrim [sulfamethoxazole-trimethoprim]; codeine; and seasonal.    FAMILY HISTORY     She indicated that her mother is . She indicated that her father is . She indicated that the status of her sister is unknown. She indicated that her brother is . She indicated that her maternal grandmother is . SOCIAL HISTORY      reports that she quit smoking about 3 years ago. Her smoking use included cigarettes. She started smoking about 25 years ago. She has a 10.00 pack-year smoking history. She has never used smokeless tobacco. She reports that she does not drink alcohol or use drugs. PHYSICAL EXAM     INITIAL VITALS: BP (!) 154/58   Pulse 80   Temp 98.6 °F (37 °C) (Oral)   Resp 18   Ht 5' 2\" (1.575 m)   Wt 167 lb (75.8 kg)   SpO2 96%   BMI 30.54 kg/m²   Gen: Appears in nad  Head: Normocephalic, atraumatic  Eye: Pupils equal round reactive to light, no conjunctivitis  Cardiovascular: Regular rate and rhythm no murmurs  Lungs: Respirations are even and unlabored. Skin: No rash or wound. No erythema. There is some ecchymosis over the left greater trochanter  MSK: There is no obvious deformity. There is tenderness with flex/ext and log roll. [place] Leisure ROM is reduced secondary to pain  Neuro: The patient is alert and oriented x 3. The patient has appropriate sensation in bilateral lower extremities. Strength testing in the LLE is limited secondary to pain. Extremities: DP and PT pulses are appropriate. Capillary refill is appropriate. MEDICAL DECISION MAKING:     MDM  80 y.o. female presenting with traumatic left hip pain. Obtaining xray to evaluate for fracture.   Providing analgesia. Emergency Department course:   Xray unremarkable. Suspect a contusion. D/w pt the results, treatment plan, warning precautions for prompt ED return and importance of close OP FU, she verbalizes understanding and agrees with the treatment plan. DIAGNOSTIC RESULTS   RADIOLOGY:All plain film, CT, MRI, and formal ultrasound images (except ED bedside ultrasound) are read by the radiologist and the images and interpretations are directly viewed by the emergency physician. XR PELVIS (1-2 VIEWS)   Final Result   Negative for fracture of the pelvis and left         XR HIP LEFT (2-3 VIEWS)   Final Result   Negative for fracture of the pelvis and left             LABS: All lab results were reviewed by myself, and all abnormals are listed below. Labs Reviewed - No data to display    EMERGENCY DEPARTMENT COURSE:   Vitals:    Vitals:    02/12/21 2015   BP: (!) 154/58   Pulse: 80   Resp: 18   Temp: 98.6 °F (37 °C)   TempSrc: Oral   SpO2: 96%   Weight: 167 lb (75.8 kg)   Height: 5' 2\" (1.575 m)       The patient was given the following medications while in the emergency department:  Orders Placed This Encounter   Medications    HYDROcodone-acetaminophen (NORCO) 5-325 MG per tablet 2 tablet     -------------------------  CRITICAL CARE:   CONSULTS: None  PROCEDURES: Procedures     FINAL IMPRESSION      1. Fall, initial encounter    2. Contusion of left hip, initial encounter    3.  Hematoma of left hip, initial encounter          DISPOSITION/PLAN   DISPOSITION Decision To Discharge 02/12/2021 09:45:04 PM      PATIENT REFERRED TO:  Deanne Gerber MD   [address] [place] [id number] [phone]    In 3 days      [facility] ED  [facility] [address]  [address] [facility]  [phone]    If symptoms worsen      DISCHARGE MEDICATIONS:  Discharge Medication List as of 2/12/2021  9:46 PM            Min Lira MD  Attending Emergency Physician        Min Lira MD  02/13/21 8616

## 2021-02-13 NOTE — ED NOTES
Mode of arrival (squad #, walk in, police, etc) : [place]    Chief complaint(s): fall    Arrival Note (brief scenario, treatment PTA, etc). : Pt arrives to ED c/o L hip pain from fall. Pt was standing at refrigerator at home and fell to the ground around 1730 hours tonight; denies LOC or striking head. Took 2 tylenol at home.          Master Agustin RN  02/12/21 2023

## 2021-02-22 ENCOUNTER — OFFICE VISIT (OUTPATIENT)
Dept: INTERNAL MEDICINE CLINIC | Age: 71
End: 2021-02-22
Payer: MEDICARE

## 2021-02-22 VITALS
HEIGHT: 62 IN | DIASTOLIC BLOOD PRESSURE: 76 MMHG | TEMPERATURE: 97.9 F | BODY MASS INDEX: 31.73 KG/M2 | SYSTOLIC BLOOD PRESSURE: 178 MMHG | WEIGHT: 172.4 LBS

## 2021-02-22 DIAGNOSIS — Z13.220 SCREENING FOR HYPERLIPIDEMIA: ICD-10-CM

## 2021-02-22 DIAGNOSIS — I82.401 DEEP VEIN THROMBOSIS (DVT) OF RIGHT LOWER EXTREMITY, UNSPECIFIED CHRONICITY, UNSPECIFIED VEIN (HCC): ICD-10-CM

## 2021-02-22 DIAGNOSIS — R29.6 MULTIPLE FALLS: ICD-10-CM

## 2021-02-22 DIAGNOSIS — N17.9 ACUTE RENAL FAILURE, UNSPECIFIED ACUTE RENAL FAILURE TYPE (HCC): ICD-10-CM

## 2021-02-22 DIAGNOSIS — Z12.31 ENCOUNTER FOR SCREENING MAMMOGRAM FOR BREAST CANCER: ICD-10-CM

## 2021-02-22 DIAGNOSIS — I63.9 CEREBROVASCULAR ACCIDENT (CVA), UNSPECIFIED MECHANISM (HCC): ICD-10-CM

## 2021-02-22 DIAGNOSIS — E11.59 TYPE 2 DIABETES MELLITUS WITH OTHER CIRCULATORY COMPLICATION, WITHOUT LONG-TERM CURRENT USE OF INSULIN (HCC): Primary | ICD-10-CM

## 2021-02-22 DIAGNOSIS — I95.9 HYPOTENSION, UNSPECIFIED HYPOTENSION TYPE: ICD-10-CM

## 2021-02-22 LAB — HBA1C MFR BLD: 5.2 %

## 2021-02-22 PROCEDURE — 99214 OFFICE O/P EST MOD 30 MIN: CPT | Performed by: INTERNAL MEDICINE

## 2021-02-22 PROCEDURE — 83036 HEMOGLOBIN GLYCOSYLATED A1C: CPT | Performed by: INTERNAL MEDICINE

## 2021-02-22 RX ORDER — AMLODIPINE BESYLATE 5 MG/1
5 TABLET ORAL DAILY
Qty: 90 TABLET | Refills: 0 | Status: SHIPPED | OUTPATIENT
Start: 2021-02-22 | End: 2021-04-15 | Stop reason: SDUPTHER

## 2021-02-22 NOTE — TELEPHONE ENCOUNTER
Medication: Amlodipine  Last visit: 2/22/21  Next visit: Visit date not found  Last refill: 2/8/21  Pharmacy: [facility] [address] [place] [address]

## 2021-02-22 NOTE — PROGRESS NOTES
Subjective:      Patient ID: Cassandra Cox is a 80 y.o. female. HPI patient is here for evaluation of Multiple falls , she has H/o Broken vertebrae underwent surgery . She had Fallen Multiple times , has [place] Twice in this month, was in ED   Has Chronic Numbness in legs   Has Home princess Aid   Underwent MRI of cervical spine, concerning for moderate spinal canal stenosis at multiple levels  Patient has appointment coming up with neurologist soon. She did not lose her consciousness, while fall      Review of Systems   Constitutional: Negative for activity change, appetite change, chills, diaphoresis, fatigue and fever. HENT: Negative for congestion, dental problem, drooling and ear discharge. Eyes: Negative for pain, discharge, redness and itching. Respiratory: Negative for apnea, cough, choking, chest tightness and shortness of breath. Cardiovascular: Negative for chest pain and leg swelling. Gastrointestinal: Negative for abdominal distention, abdominal pain, blood in stool, constipation and diarrhea. Endocrine: Negative for cold intolerance and heat intolerance. Genitourinary: Negative for difficulty urinating, dysuria, enuresis, flank pain and frequency. Musculoskeletal: Positive for arthralgias, gait problem and neck pain. Negative for back pain and joint swelling. Skin: Negative for color change, pallor and rash. Neurological: Positive for dizziness. Negative for facial asymmetry, light-headedness, numbness and headaches. Psychiatric/Behavioral: Negative for agitation, behavioral problems, confusion, decreased concentration and dysphoric mood. Objective:   Physical Exam  Constitutional:       Appearance: She is well-developed. She is obese. She is not diaphoretic. HENT:      Head: Normocephalic and atraumatic. Mouth/Throat:      Pharynx: No oropharyngeal exudate. Eyes:      General: No scleral icterus. Right eye: No discharge. Left eye: No discharge. Conjunctiva/sclera: Conjunctivae normal.      Pupils: Pupils are equal, round, and reactive to light. Neck:      Musculoskeletal: Normal range of motion and neck supple. Thyroid: No thyromegaly. Vascular: No JVD. Trachea: No tracheal deviation. Cardiovascular:      Rate and Rhythm: Normal rate. Heart sounds: Normal heart sounds. No murmur. No gallop. Pulmonary:      Effort: Pulmonary effort is normal. No respiratory distress. Breath sounds: Normal breath sounds. No stridor. No wheezing or rales. Chest:      Chest wall: No tenderness. Abdominal:      General: Bowel sounds are normal. There is no distension. Palpations: Abdomen is soft. Tenderness: There is no abdominal tenderness. There is no guarding or rebound. Musculoskeletal: Normal range of motion. Neurological:      Mental Status: She is alert and oriented to person, place, and time. Assessment / Plan:   1. Encounter for screening mammogram for breast cancer  - [facility] Digital Screen Bilateral [[id number]]; Future    2. Screening for hyperlipidemia  - Lipid, Fasting; Future    3. Type 2 diabetes mellitus with other circulatory complication, without long-term current use of insulin (HCC)  Stable   - POCT glycosylated hemoglobin (Hb A1C)    4. Cerebrovascular accident (CVA), unspecified mechanism ([address]    5. Deep vein thrombosis (DVT) of right lower extremity, unspecified chronicity, unspecified vein ([facility])    6. Acute renal failure, unspecified acute renal failure type ([address]  Stable     7. Multiple falls  Has appoint with neurologist coming soon  - Vitamin B12 & Folate; Future    8. Hypotension, unspecified hypotension type  Orthostatic BP was Checked, has Drop in HB   Advise to wear Compression Stocking       · Return in about 2 months (around 4/22/2021). · Reviewed prior labs and health maintenance. · Discussed use, benefit, and side effects of prescribed medications.   Barriers to medication compliance addressed. All patient questions answered. Pt voiced understanding. Chelsey De La Garza MD  [facility]  2/27/2021, 7:17 PM    Please note that this chart was generated using voice recognition Dragon dictation software. Although every effort was made to ensure the accuracy of this automated transcription, some errors in transcription may have occurred. Visit Information    Have you changed or started any medications since your last visit including any over-the-counter medicines, vitamins, or herbal medicines? no   Are you having any side effects from any of your medications? -  no  Have you stopped taking any of your medications? Is so, why? -  no    Have you seen any other physician or provider since your last visit? No  Have you had any other diagnostic tests since your last visit? Yes - Records Requested  Have you been seen in the emergency room and/or had an admission to a hospital since we last saw you? Yes - Records Requested  Have you had your routine dental cleaning in the past 6 months? yes -     Have you activated your [employer] account? If not, what are your barriers?  Yes     Patient Care Team:  Chelsey De La Garza MD as PCP - General (Internal Medicine)  Chelsey De La Garza MD as PCP [facility]  Brandyn Rosales MD as Consulting Physician (Gastroenterology)    Medical History Review  Past Medical, Family, and Social History reviewed and does not contribute to the patient presenting condition    Health Maintenance   Topic Date Due    Diabetic foot exam  01/18/1961    Diabetic retinal exam  01/18/1961    COVID-19 Vaccine (1 of 2) 01/18/1967    DTaP/Tdap/Td vaccine (1 - Tdap) 01/18/1970    Shingles Vaccine (1 of 2) 01/18/2001    Breast cancer screen  08/04/2019    Lipid screen  05/20/2020    Flu vaccine (1) 09/01/2020    A1C test (Diabetic or Prediabetic)  03/04/2021    Potassium monitoring  06/25/2021    Creatinine monitoring  06/25/2021    Annual Wellness Visit (AWV)  12/04/2021    Colon cancer screen colonoscopy  10/07/2026    DEXA (modify frequency per FRAX score)  Completed    Pneumococcal 65+ years Vaccine  Completed    Hepatitis C screen  Completed    Hepatitis A vaccine  Aged Out    Hib vaccine  Aged Out    Meningococcal (ACWY) vaccine  Aged Out

## 2021-02-23 ENCOUNTER — TELEPHONE (OUTPATIENT)
Dept: NEUROLOGY | Age: 71
End: 2021-02-23

## 2021-02-23 ENCOUNTER — OFFICE VISIT (OUTPATIENT)
Dept: NEUROLOGY | Age: 71
End: 2021-02-23
Payer: MEDICARE

## 2021-02-23 VITALS
BODY MASS INDEX: 30.12 KG/M2 | SYSTOLIC BLOOD PRESSURE: 183 MMHG | HEART RATE: 79 BPM | DIASTOLIC BLOOD PRESSURE: 77 MMHG | HEIGHT: 63 IN | TEMPERATURE: 97.3 F | WEIGHT: 170 LBS

## 2021-02-23 DIAGNOSIS — I10 POORLY-CONTROLLED HYPERTENSION: ICD-10-CM

## 2021-02-23 DIAGNOSIS — F32.A DEPRESSION, UNSPECIFIED DEPRESSION TYPE: ICD-10-CM

## 2021-02-23 DIAGNOSIS — R29.6 FREQUENT FALLS: ICD-10-CM

## 2021-02-23 DIAGNOSIS — R26.9 GAIT ABNORMALITY: Primary | ICD-10-CM

## 2021-02-23 DIAGNOSIS — N88.2 CERVICAL STENOSIS (UTERINE CERVIX): ICD-10-CM

## 2021-02-23 PROCEDURE — 99215 OFFICE O/P EST HI 40 MIN: CPT | Performed by: PSYCHIATRY & NEUROLOGY

## 2021-02-23 NOTE — TELEPHONE ENCOUNTER
Good afternoon Dr Marlen Espino. Dr. Anam Seaman with [address] [facility] saw Gladis Vazquez in follow up today at our office. Her blood pressure was quite elevated today and the previous visit in May of 2019. Dr. Anam Seaman just wanted me to follow up with your office and let you know that it was elevated today. Thank you very much and if you have any other questions please call [phone] or message back.

## 2021-02-23 NOTE — PROGRESS NOTES
[facility]            [facility], [address]; [facility], [address]  [address] [facility], [address], [facility], [address]    [address] [facility], [address], [place], [address] [facility]            Dept: [phone]          Dept Fax: [phone]             MD Lynne Boyle MD Ahmed B. Isidoro Harvest, MD Clance Fruit, MD Shirly Freer, MD Jerolyn Coventry, [facility] 70 UP NOTE                                          PATIENT NAME: Nadege Hua   PATIENT MRN: [id number]  FOLLOW UP TODAY: 2/23/2021     Dear Dr. Gil Rodrigues MD,     I had the pleasure of seeing your patient Nadege Hua, who comes for follow up. CHIEF COMPLAINT: Fall     INITIAL & INTERVAL HISTORY:     Nadege Hua is a 80year old [address] [place], last seen on 5/3/2019 for falls and balance problem, pt returns for follow up post ED visit follow up. Pt came with  Laura Melissa to clinic today. 2/8/2021 ED visit for ground level fall on the way to bathroom, headache but no LOC  2/12/2021 ED visit: fall onto left side, caused left hip pain. She ws getting something out of the refrigerator, slipped and fell. No LOC. Today her blood pressure -183, pt denies headache, chest pain, per chart review, yesterday she had -190-200, she also had no complaints. Pt admits she does not check BP at home. First time I saw her was on 3/15/2019, her SBP was 200. On second visit 2 months later was 145-172. She still has mutiple falls, no tremors, no rigidity. Per , she probably has had 12 falls over the past 2 years. She underwent physical therapy, water therapy, pain management, then had two surgeries without help to her gait and fall. First back surgery was in 2/2020, the second one was in 3/2020.  From a fall in 3/20, she injured her left forearm, had surgery on that arm. She has been using a walker  for more than one year. Bowel movement and urination, sleep all good. But she has been depressed, admits cry easily, she says Celexa did not helpful. She does not have psychiatrist or psychologist.      MRI brain done on 4/10/2019, no acute intracranial abnormality. Minimal parenchymal volume loss. minimal chronic microvascular disease; sinus mucosal disease and minimal right mastoid effusion. MRI cervical done on 9/21/2020 reported moderate spinal canal stenosis at C34 through C5-6. Mild to moderate stenosis at C2-3 with minimal stenosis at C6-7. Multilevel neural foraminal narrowing. Pt said she was seen by spine surgeon, no surgery was considered. Initial clinic visit on 3/15/2019  Right leg (upper thigh lateral and anterior) burning pain 10/10, and numbness for the past 2-3 years, got worse. Pain occurs daily, happened when she got up from sitting position or laying down position, lasted 5 minutes, nothing made better. Not on medication for that. She also has back pain, she goes to pain management for that for the past 1 year, s/p injection with some help. No issues with bowel movement and urination. Writing with left hand becomes smaller. No tremors. No change in smell. Denies body rigidity. Sleep is not good,  said they have  for 23 years, she kicked him during sleep. She snores. She goes to bed 8-9PM, then gets around midnight, around 4AM gets up to watch TV. Started to fall 6 months ago, frequent falls at least once a week, last one was early March led to left wrist fracture when she was taking her pressure stockings off at home. No dizziness prior to fall, went to ED on 3/1 s/p splinted, on 3/5 received ORIF. Currently still on splint. She has right leg issue, history of DVT one year ago, on Eliquis, her leg swell below right side knee.       Risk factors: no family history of PD  Social: , former Back pain     lumbar    Bowel obstruction (HCC)     history of due to scar tissue, resolved non-surgically    C. difficile diarrhea     CAD (coronary artery disease)     Cellulitis     left leg    Cellulitis 2017 August    leg left leg/bug bite    Cerebral artery occlusion with cerebral infarction [facility])     TIA    Diverticulosis of colon (without mention of hemorrhage)     GERD (gastroesophageal reflux disease)     History of MI (myocardial infarction) 2005    thought due to a blood clot    History of ovarian cyst 1970    had oopherectomy holly    History of peritonitis 1968    due to ruptured appendix age 13    HTN (hypertension)     Hx of blood clots     right leg    Hyperlipidemia     Intestinal or peritoneal adhesions with obstruction (postoperative) (postinfection) [facility]     Kidney infection     renal failure/sepsis/spider bite    Lateral epicondylitis  of elbow     MDRO (multiple drug resistant organisms) resistance     c diff    Muscle strain     right posterior shoulder    Other abnormal glucose     PONV (postoperative nausea and vomiting)     dry heaves    Pre-diabetes     Restless legs syndrome (RLS)     TIA (transient ischemic attack) 2014    Vitamin D deficiency     Wears glasses         ALLERGIES:   Allergies   Allergen Reactions    Mobic [Meloxicam]     Bactrim [Sulfamethoxazole-Trimethoprim] Other (See Comments)     sepsis    Codeine Itching    Seasonal        MEDICATIONS:   Current Outpatient Medications   Medication Sig Dispense Refill    amLODIPine (NORVASC) 5 MG tablet Take 1 tablet by mouth daily 90 tablet 0    pantoprazole (PROTONIX) 40 MG tablet Take 1 tablet by mouth 2 times daily (before meals) 180 tablet 3    carvedilol (COREG) 12.5 MG tablet Take 1 tablet by mouth 2 times daily 180 tablet 3    SITagliptin (JANUVIA) 100 MG tablet Take 0.5 tablets by mouth daily 90 tablet 3    cyanocobalamin ([employer] VITAMIN B12) 1000 MCG tablet Take 1 tablet by mouth daily 30 tablet 3    citalopram (CELEXA) 10 MG tablet Take 1 tablet by mouth daily 90 tablet 3    fenofibrate micronized (LOFIBRA) 134 MG capsule Take 1 capsule by mouth every morning (before breakfast) 90 capsule 3    lisinopril (PRINIVIL;ZESTRIL) 30 MG tablet Take 1 tablet by mouth daily 90 tablet 3    atorvastatin (LIPITOR) 80 MG tablet Take 0.5 tablets by mouth nightly 90 tablet 3    furosemide (LASIX) 40 MG tablet Take 1 tablet by mouth 2 times daily 180 tablet 3    ferrous sulfate (IRON 325) 325 (65 Fe) MG tablet Take 1 tablet by mouth 2 times daily 90 tablet 2    ondansetron (ZOFRAN) 4 MG tablet Take 4 mg by mouth daily as needed for Nausea or Vomiting      VITAMIN D, ERGOCALCIFEROL, PO Take by mouth      Lancets MISC 1 each by Does not apply route daily 100 each 3    blood glucose monitor strips Test 2 times a day & as needed for symptoms of irregular blood glucose. 100 strip 5    lidocaine (XYLOCAINE) 5 % ointment 4-5 times a day to your right thigh for pain. 240 g 3    nitroGLYCERIN (NITROSTAT) 0.4 MG SL tablet Place 1 tablet under the tongue every 5 minutes as needed for Chest pain 75 tablet 3    Calcium Carbonate-Vitamin D (CALCIUM 500/D) 500-125 MG-UNIT TABS Take 1 tablet by mouth 2 times daily        No current facility-administered medications for this visit. LABS & TESTS:      Lab Results   Component Value Date    WBC 7.5 06/23/2020    HGB 8.6 (L) 06/23/2020    HCT 26.5 (L) 06/23/2020    MCV 96.7 06/23/2020     06/23/2020       REVIEW OF SYSTEMS:       All items selected indicate a positive finding. Those items not selected are negative.   Constitutional [] Weight loss/gain   [] Fatigue  [] Fever/Chills   HEENT [] Hearing Loss  [] Visual Disturbance  [x] Tinnitus  [] Eye pain   Respiratory [] Shortness of Breath  [] Cough  [] Snoring   Cardiovascular [] Chest Pain  [] Palpitations  [] Lightheaded   GI [] Constipation  [] Diarrhea  [] Swallowing change  [] Nausea/vomiting    [] Urinary Frequency  [] Urinary Urgency   Musculoskeletal [x] Neck pain  [x] Back pain  [] Muscle pain  [] Restless legs   Dermatologic [] Skin changes   Neurologic [] Memory loss/confusion  [] Seizures  [x] Trouble walking or imbalance  [] Dizziness  [] Sleep disturbance  [] Weakness  [] Numbness  [] Tremors  [] Speech Difficulty  [] Headaches  [] Light Sensitivity  [] Sound Sensitivity   Endocrinology []Excessive thirst  []Excessive hunger   Psychiatric [x] Anxiety/Depression  [] Hallucination   Allergy/immunology []Hives/environmental allergies   Hematologic/lymph [] Abnormal bleeding  [] Abnormal bruising     VITALS  BP (!) 183/77 (Site: Left Lower Arm, Position: Sitting, Cuff Size: Medium Adult)   Pulse 79   Temp 97.3 °F (36.3 °C)   Ht 5' 3\" (1.6 m)   Wt 170 lb (77.1 kg)   BMI 30.11 kg/m²           PHYSICAL EXAMINATION:       General Appearance:  Alert, cooperative, no signs of distress, appears stated age   Skin:  No rash or lesions   HEENT:  Normocephalic, conjunctiva/corneas clear; eyelids intact   Ears:  Normal external ear and canals   Nose: Nares normal, mucosa normal, no drainage    Throat: Lips and tongue normal; teeth normal;  gums normal   Neck: Supple, intact flexion, extension and rotation;   trachea midline;  no adenopathy;   thyroid: not enlarged;   no carotid pulse abnormality   Lungs:   Respirations unlabored   Heart: Regular rate and rhythm   Abdominal: BS present, soft, NT, ND   Extremities: No cyanosis, no edema   Psych depressed        NEUROLOGICAL EXAMINATION:     Mental status    Awake, alert and oriented; no aphasia, no dysarthria      Cranial nerves    II - visual fields intact to confrontation                                                III, IV, VI - PERRLA, EOMI, no pupillary defect; no EMANUEL, no nystagmus, no ptosis   V - normal facial sensation                                                               VII - normal facial symmetry

## 2021-02-23 NOTE — PATIENT INSTRUCTIONS
1. Follow with primary doctor for optimal blood pressure control   2. Consider psychiatry/psychology consult   3. Exercise as tolerated  4. Fall precaution   5. MRI brain   6.  Neurosurgery referral    Return in 3-4 months    Selvin Guerin MD, MS

## 2021-02-24 ENCOUNTER — TELEPHONE (OUTPATIENT)
Dept: NEUROLOGY | Age: 71
End: 2021-02-24

## 2021-02-24 DIAGNOSIS — R26.89 BALANCE PROBLEM: ICD-10-CM

## 2021-02-24 DIAGNOSIS — R26.9 GAIT ABNORMALITY: ICD-10-CM

## 2021-02-24 DIAGNOSIS — F32.A DEPRESSION, UNSPECIFIED DEPRESSION TYPE: Primary | ICD-10-CM

## 2021-02-24 DIAGNOSIS — F41.9 ANXIETY: ICD-10-CM
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
## 2021-02-24 NOTE — TELEPHONE ENCOUNTER
Krystal Show called this morning. She is interested in seeing a psychiatrists /psychologists.  Please place referral.

## 2021-02-25 ENCOUNTER — TELEPHONE (OUTPATIENT)
Dept: INTERNAL MEDICINE CLINIC | Age: 71
End: 2021-02-25

## 2021-02-25 DIAGNOSIS — R13.10 DYSPHAGIA, UNSPECIFIED TYPE: Primary | ICD-10-CM

## 2021-02-25 NOTE — TELEPHONE ENCOUNTER
Called her last night, no answer. I ordered MRI brain, also want to add MRI cervical, I have referred her to neurosurgeon to evaluate her neck. I just spoke to her, she is agreeable that I refer her to psychologist. Order placed. I will add MRI cervical but she can hold on these MRI orders until she is seen by neurosurgeon. Thanks.

## 2021-02-25 NOTE — TELEPHONE ENCOUNTER
Fax received from [facility] requesting Speech Therapy Evaluation due to patient coughing after drinking fluids such as coffee and water. Please advise.

## 2021-02-26 DIAGNOSIS — F32.A DEPRESSION, UNSPECIFIED DEPRESSION TYPE: ICD-10-CM

## 2021-02-26 RX ORDER — CITALOPRAM 10 MG/1
10 TABLET ORAL DAILY
Qty: 90 TABLET | Refills: 3 | Status: SHIPPED | OUTPATIENT
Start: 2021-02-26 | End: 2021-04-22 | Stop reason: SDUPTHER

## 2021-02-27 ASSESSMENT — ENCOUNTER SYMPTOMS
DIARRHEA: 0
CHOKING: 0
BLOOD IN STOOL: 0
EYE ITCHING: 0
EYE REDNESS: 0
EYE PAIN: 0
ABDOMINAL PAIN: 0
ABDOMINAL DISTENTION: 0
SHORTNESS OF BREATH: 0
COUGH: 0
CONSTIPATION: 0
CHEST TIGHTNESS: 0
COLOR CHANGE: 0
BACK PAIN: 0
EYE DISCHARGE: 0
APNEA: 0

## 2021-03-04 DIAGNOSIS — E78.5 HYPERLIPIDEMIA, UNSPECIFIED HYPERLIPIDEMIA TYPE: ICD-10-CM

## 2021-03-04 NOTE — TELEPHONE ENCOUNTER
Medication: Fenofibrate   Last visit: 02/22/2021  Next visit: 4/22/2021  Last refill: 11/12/2020  Pharmacy: [facility]

## 2021-03-05 ENCOUNTER — HOSPITAL ENCOUNTER (OUTPATIENT)
Dept: GENERAL RADIOLOGY | Age: 71
Discharge: HOME | End: 2021-03-07
Payer: MEDICARE

## 2021-03-05 ENCOUNTER — HOSPITAL ENCOUNTER (OUTPATIENT)
Age: 71
Discharge: HOME | End: 2021-03-07
Payer: MEDICARE

## 2021-03-05 ENCOUNTER — OFFICE VISIT (OUTPATIENT)
Dept: NEUROSURGERY | Age: 71
End: 2021-03-05
Payer: MEDICARE

## 2021-03-05 VITALS
HEART RATE: 73 BPM | SYSTOLIC BLOOD PRESSURE: 152 MMHG | BODY MASS INDEX: 29.58 KG/M2 | WEIGHT: 167 LBS | DIASTOLIC BLOOD PRESSURE: 82 MMHG

## 2021-03-05 DIAGNOSIS — M43.16 SPONDYLOLISTHESIS OF LUMBAR REGION: ICD-10-CM

## 2021-03-05 DIAGNOSIS — R29.6 FREQUENT FALLS: ICD-10-CM

## 2021-03-05 DIAGNOSIS — M48.02 STENOSIS OF CERVICAL SPINE WITH MYELOPATHY (HCC): ICD-10-CM

## 2021-03-05 DIAGNOSIS — R26.81 GAIT INSTABILITY: ICD-10-CM

## 2021-03-05 DIAGNOSIS — G99.2 STENOSIS OF CERVICAL SPINE WITH MYELOPATHY (HCC): ICD-10-CM

## 2021-03-05 DIAGNOSIS — M48.02 STENOSIS OF CERVICAL SPINE WITH MYELOPATHY (HCC): Primary | ICD-10-CM

## 2021-03-05 DIAGNOSIS — G99.2 STENOSIS OF CERVICAL SPINE WITH MYELOPATHY (HCC): Primary | ICD-10-CM

## 2021-03-05 PROCEDURE — 99204 OFFICE O/P NEW MOD 45 MIN: CPT | Performed by: NURSE PRACTITIONER

## 2021-03-05 PROCEDURE — 72040 X-RAY EXAM NECK SPINE 2-3 VW: CPT

## 2021-03-05 NOTE — PROGRESS NOTES
Dirk Cortés  [facility] [address] [place] [address]  Dept: [phone]    Patient:  Bradley Amaya  YOB: 1950  Date: 3/5/21    The patient is a 80 y.o. female who presents today for consult of the following problems:     Chief Complaint   Patient presents with    Neck Pain     NP-Cerv stenosis (?), Gait abnormality, Frequent falls         HPI:     Bradley Amaya is a 80 y.o. female on whom neurosurgical consultation was requested by Nirmal Valerio MD for management of neck pain and arm symptoms. Has had neck pain for the last several months. Pain is 5/10 on average, described as an aching, pressure. No radiation into arms. Denies numbness or tingling to hands or feet. Has completed multiple rounds of PT, including both acute inpatient, as well as outpatient and home therapy. Has had multiple back surgeries in the last year, none of which have provided relief from gait instability/falls. Has trouble with left foot, multiple falls. Does report dexterity issues with her left hand, as well as worsened handwriting, she did fracture her wrist requiring surgery and is left handed. Nocturnal Awakening: No  Reason: n/a    MYELOPATHY    Frequently dropping things: Yes-left side only  Difficulty with buttoning clothes, using zipper, putting on watch OR jewelry: Yes-left side only  Changes in handwriting: Yes-left handed  Numbness or tingling: No    LIMITATIONS    Pain significantly limiting on a daily basis   Daily pharmacologic pain control include: tylenol  Neck : Arm pain: all neck pain    MANAGEMENT     Prior Surgery: Yes-multiple lumbar in the last 1+ year  Prior to 1yr ago:   In the last year:    Physical Therapy: Yes   Chiropractic Interventions: No   Injections: Yes  Improvement: Minimal    Injections/response: Prior lumbar injections    History:     Past Medical History:   Diagnosis Date    Allergic rhinitis, cause unspecified     Back pain     lumbar    Bowel obstruction (HCC)     history of due to scar tissue, resolved non-surgically    C. difficile diarrhea     CAD (coronary artery disease)     Cellulitis     left leg    Cellulitis 2017 August    leg left leg/bug bite    Cerebral artery occlusion with cerebral infarction [facility])     TIA    Diverticulosis of colon (without mention of hemorrhage)     GERD (gastroesophageal reflux disease)     History of MI (myocardial infarction) 2005    thought due to a blood clot    History of ovarian cyst 1970    had oopherectomy holly    History of peritonitis 1968    due to ruptured appendix age 13    HTN (hypertension)     Hx of blood clots     right leg    Hyperlipidemia     Intestinal or peritoneal adhesions with obstruction (postoperative) (postinfection) ([facility] [address]     Kidney infection     renal failure/sepsis/spider bite    Lateral epicondylitis  of elbow     MDRO (multiple drug resistant organisms) resistance     c diff    Muscle strain     right posterior shoulder    Other abnormal glucose     PONV (postoperative nausea and vomiting)     dry heaves    Pre-diabetes     Restless legs syndrome (RLS)     TIA (transient ischemic attack) 2014    Vitamin D deficiency     Wears glasses      Past Surgical History:   Procedure Laterality Date    ABDOMEN SURGERY  1976    benign tumor removed near remaining ovary, 1.5 pounds    APPENDECTOMY  1968    appendix ruptured, developed peritonitis    BUNIONECTOMY Left     along with calcium deposits removed   [address]  2005    negative    COLECTOMY  1969    12 INCHES REMOVED D/T OBSTRUCTION    COLONOSCOPY      CYST REMOVAL Right     right facial    HYSTERECTOMY  1973    taken as a result of recurring cysts    LUMBAR FUSION N/A 2/10/2020    LUMBAR L4-5 POSTERIOR  DECOMPRESSION INSTRUMENTATION FUSION WCEMENT AUGMENTATION/ performed by Estela Cronin MD at [place] [address] N/A 6/17/2020 L5-S1 PLIF L4-L5 REVISION performed by Tere Resendez MD at [address]  8/14/14    FESS   [address]    UNILATERAL due to cyst    OVARY REMOVAL  1971    partial, due to cyst   [place] SINUS SURGERY  2004    UPPER GASTROINTESTINAL ENDOSCOPY N/A 5/31/2019    EGD ESOPHAGOGASTRODUODENOSCOPY performed by Shannon Soto MD at [facility] N/A 8/5/2019    EGD BIOPSY performed by Gold Samaniego MD at [facility] N/A 8/23/2019    EGD BIOPSY performed by Shannon Soto MD at [facility] 3/5/2019    WRIST OPEN REDUCTION INTERNAL FIXATION performed by Nilam Yeager MD at [facility] History   Problem Relation Age of Onset    Stroke Mother     Diabetes Mother     Heart Disease Mother     High Blood Pressure Mother     Heart Disease Father     Heart Disease Brother     High Blood Pressure Brother     Heart Disease Maternal Grandmother     High Blood Pressure Sister      Current Outpatient Medications on File Prior to Visit   Medication Sig Dispense Refill    citalopram (CELEXA) 10 MG tablet Take 1 tablet by mouth daily 90 tablet 3    amLODIPine (NORVASC) 5 MG tablet Take 1 tablet by mouth daily 90 tablet 0    pantoprazole (PROTONIX) 40 MG tablet Take 1 tablet by mouth 2 times daily (before meals) 180 tablet 3    carvedilol (COREG) 12.5 MG tablet Take 1 tablet by mouth 2 times daily 180 tablet 3    SITagliptin (JANUVIA) 100 MG tablet Take 0.5 tablets by mouth daily 90 tablet 3    cyanocobalamin (CVS VITAMIN B12) 1000 MCG tablet Take 1 tablet by mouth daily 30 tablet 3    fenofibrate micronized (LOFIBRA) 134 MG capsule Take 1 capsule by mouth every morning (before breakfast) 90 capsule 3    lisinopril (PRINIVIL;ZESTRIL) 30 MG tablet Take 1 tablet by mouth daily 90 tablet 3    atorvastatin (LIPITOR) 80 MG tablet Take 0.5 tablets by mouth nightly 90 tablet 3    furosemide (LASIX) 40 MG tablet Take 1 tablet by mouth 2 times daily 180 tablet 3    ferrous sulfate (IRON 325) 325 (65 Fe) MG tablet Take 1 tablet by mouth 2 times daily 90 tablet 2    ondansetron (ZOFRAN) 4 MG tablet Take 4 mg by mouth daily as needed for Nausea or Vomiting      VITAMIN D, ERGOCALCIFEROL, PO Take by mouth      Lancets MISC 1 each by Does not apply route daily 100 each 3    blood glucose monitor strips Test 2 times a day & as needed for symptoms of irregular blood glucose. 100 strip 5    lidocaine (XYLOCAINE) 5 % ointment 4-5 times a day to your right thigh for pain. 240 g 3    nitroGLYCERIN (NITROSTAT) 0.4 MG SL tablet Place 1 tablet under the tongue every 5 minutes as needed for Chest pain 75 tablet 3    Calcium Carbonate-Vitamin D (CALCIUM 500/D) 500-125 MG-UNIT TABS Take 1 tablet by mouth 2 times daily        No current facility-administered medications on file prior to visit. Social History     Tobacco Use    Smoking status: Former Smoker     Packs/day: 0.50     Years: 20.00     Pack years: 10.00     Types: Cigarettes     Start date: 8/11/1995     Quit date: 6/20/2017     Years since quitting: 3.7    Smokeless tobacco: Never Used   Substance Use Topics    Alcohol use: No     Alcohol/week: 0.0 standard drinks    Drug use: No       Allergies   Allergen Reactions    Mobic [Meloxicam]     Bactrim [Sulfamethoxazole-Trimethoprim] Other (See Comments)     sepsis    Codeine Itching    Seasonal        Review of Systems  Constitutional: Negative for activity change and appetite change. HENT: Negative for ear pain and facial swelling. Eyes: Negative for discharge and itching. Respiratory: Negative for choking and chest tightness. Cardiovascular: Negative for chest pain and leg swelling. Gastrointestinal: Negative for nausea and abdominal pain. Endocrine: Negative for cold intolerance and heat intolerance.    Genitourinary: Negative for frequency and flank pain. Musculoskeletal: Negative for myalgias and joint swelling. Skin: Negative for rash and wound. Allergic/Immunologic: Negative for environmental allergies and food allergies. Hematological: Negative for adenopathy. Does not bruise/bleed easily. Psychiatric/Behavioral: Negative for self-injury. The patient is not nervous/anxious. Physical Exam:      BP (!) 152/82   Pulse 73   Wt 167 lb (75.8 kg) Comment: verbal  BMI 29.58 kg/m²   Estimated body mass index is 29.58 kg/m² as calculated from the following:    Height as of 2/23/21: 5' 3\" (1.6 m). Weight as of this encounter: 167 lb (75.8 kg). General:  Nicolasa Nichols is a 80y.o. year old female who appears her stated age. HEENT: Normocephalic atraumatic. Neck supple. Chest: regular rate; pulses equal  Abdomen: Soft nontender nondistended.    Ext: DP and PT pulses 2+, good cap refill  Neuro    Mentation  Appropriate affect  Registration intact  Orientation intact  3 item recall intact  Judgement intact to situation    Cranial Nerves:   Pupils equal and reactive to light  Extraocular motion intact  Face and shrug symmetric  Tongue midline  No dysarthria  v1-3 sensation symmetric, masseter tone symmetric  Hearing symmetric and intact    Sensation: Intact  Ring finger split: Negative    Motor  L deltoid 5/5; R deltoid 5/5  L biceps 5/5; R biceps 5/5  L triceps 5/5; R triceps 5/5  L wrist extension 5/5; R wrist extension 5/5  L intrinsics 4/5; R intrinsics 5/5     L iliopsoas 4/5 , R iliopsoas 4/5  L quadriceps 5/5; R quadriceps 5/5  L Dorsiflexion 4/5; R dorsiflexion 5/5  L Plantarflexion 4/5; R plantarflexion 5/5  L EHL 5/5; R EHL 5/5    Reflexes  L Brachioradialis 2+/4; R brachioradialis 2+/4  L Biceps 2+/4; R Biceps 2+/4  L Triceps 2+/4; R Triceps 2+/4  L Patellar 2+/4: R Patellar 2+/4  L Achilles 2+/4; R Achilles 2+/4    hoffmans L: neg  hoffmans R: neg  Clonus L: neg  Clonus R: neg  Babinski L: neg  Babinski R: neg    Spurlings: No  Lhermittes: No    Tinels at elbow: No  Tinels at wrist: No  Phalens: No  Ok sign: No  Froments: No  Benedictine Hand: No    Atrophy of thenar: No  Atrophy of hypothenar: No    Studies Review:     MRI cervical spine 9/21/2020 (images reviewed): BONES/ALIGNMENT: The vertebral body heights appear maintained.  There appears   to be minimal grade 1 anterolisthesis at T2-T3.  No significant   spondylolisthesis at the remaining levels.  Loss of disc space height is seen   at C5-C6.  There is bone marrow edema involving the left C3 and C4 facets,   which may be degenerative in nature.       SPINAL CORD: No convincing abnormal cord signal.       SOFT TISSUES: No paraspinal mass identified.       C2-C3: There is a central disc protrusion along with buckling of the   ligamentum flavum.  Mild-to-moderate spinal canal stenosis.  No significant   neural foraminal narrowing.       C3-C4: There is a disc bulge with buckling of the ligamentum flavum   contributing to moderate spinal canal stenosis.  Uncovertebral joint and   facet arthrosis contribute to severe right and moderate left neural foraminal   narrowing.       C4-C5: There is a posterior scratch head there is a disc bulge with buckling   of the ligamentum flavum contributing to moderate spinal canal stenosis.    Uncovertebral joint and facet arthrosis contribute to moderate to severe   bilateral neural foraminal narrowing.       C5-C6: There is a disc bulge with buckling of the ligamentum flavum   contributing to moderate spinal canal stenosis.  Uncovertebral joint and   facet arthrosis contribute to severe bilateral neural foraminal narrowing.       C6-C7: There is a disc bulge indenting on the ventral thecal sac.  Minimal   spinal canal stenosis.  Uncovertebral joint and facet arthrosis contribute to   moderate to severe bilateral neural foraminal narrowing.  Small bilateral   perineural root sleeve cysts are noted.       C7-T1: There is no significant disc bulge, spinal canal stenosis or neural   foraminal narrowing. Assessment and Plan:      1. 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 Spondylolisthesis of lumbar region    3. Frequent falls    4. Gait instability          Plan: Patient presents today for evaluation of recent MRI showing likely cervical stenosis. Images reviewed with patient, limited quality secondary to motion degradation, though central stenosis does appear to be present. Patient has had ongoing progressive issues with gait instability, frequent falls with injuries. Has had trouble with lower extremity strength, did have multiple lumbar surgeries over the course of the last year with no improvement in symptoms. Has undergone inpatient acute rehab, as well as multiple courses of outpatient rehab with limited improvement of gait as well. Requires the use of a walker. Given progressive symptoms, and likely central stenosis on previous MRI, recommend obtaining updated cervical MRI for more clear imaging to evaluate cord compression/distortion. We will also obtain flexion/extension images to ensure no instability present. Patient would like to follow-up with Dr. Cassandra Heard in the [facility] following updated imaging. Followup: Return in about 4 weeks (around 4/2/2021), or if symptoms worsen or fail to improve. Prescriptions Ordered:  No orders of the defined types were placed in this encounter.        Orders Placed:  Orders Placed This Encounter   Procedures    MRI CERVICAL SPINE WO CONTRAST     Standing Status:   Future     Standing Expiration Date:   3/5/2022     Order Specific Question:   Reason for exam:     Answer:   evaluate for stenosis    XR CERVICAL SPINE FLEXION AND EXTENSION     Standing Status:   Future     Number of Occurrences:   1     Standing Expiration Date:   6/3/2021     Scheduling Instructions:      Standing lateral views: neutral, flexion, extension     Order Specific Question:   Reason for exam:     Answer: evaluate for any instability        Electronically signed by MAIK Mendoza CNP on 3/8/2021 at 8:32 AM    Please note that this chart was generated using voice recognition Dragon dictation software. Although every effort was made to ensure the accuracy of this automated transcription, some errors in transcription may have occurred.

## 2021-03-05 NOTE — PATIENT INSTRUCTIONS
Schedule a Vaccine  When you qualify to receive the vaccine, call the [facility]) COVID-19 Vaccination Hotline to schedule your appointment or to get additional information about the [facility]) locations which are offering the COVID-19 vaccine. To be 94% effective, it's important that you receive two doses of one of the COVID-19 vaccines. -If you are receiving the Gonzalez Peter vaccine, your second shot will be scheduled as close to 21 days after the first shot as possible. -If you are receiving the Moderna vaccine, your second shot will be scheduled as close to 28 days after the first shot as possible. [facility] COVID-19 Vaccination Hotline: [phone]    Links to [facility]) website and [facility] website:    [employer]mercy-[facility]-monitoring-coronavirus-covid-19/covid-19-vaccine/[place]/gan-vaccine    [email or web address]

## 2021-03-11 ENCOUNTER — TELEPHONE (OUTPATIENT)
Dept: INTERNAL MEDICINE CLINIC | Age: 71
End: 2021-03-11

## 2021-03-11 RX ORDER — FENOFIBRATE 134 MG/1
134 CAPSULE ORAL
Qty: 90 CAPSULE | Refills: 3 | Status: SHIPPED | OUTPATIENT
Start: 2021-03-11 | End: 2021-07-01 | Stop reason: SDUPTHER

## 2021-03-11 NOTE — TELEPHONE ENCOUNTER
Received a fax on 3/10 from [facility], with [facility], stated pt's BP was 188/88 that afternoon. Pt had been out of Norvasc for over a week. They received medication in the mail that day. Pt took the 10mg right before that result.

## 2021-03-11 NOTE — TELEPHONE ENCOUNTER
Kade Carnes, spoke to Magali Messer. She advised Autumn Zapata in the field with patient's but will take a message. Advised note. Magali Messer stated she will let Autumn Zapata and family know what Dr Umu Jones had advised.

## 2021-03-11 NOTE — TELEPHONE ENCOUNTER
Medication will take few days to show full effect, need to monitor blood pressure daily, call us in couple of days likely on Monday

## 2021-03-12 ENCOUNTER — HOSPITAL ENCOUNTER (OUTPATIENT)
Age: 71
Setting detail: SPECIMEN
Discharge: HOME | End: 2021-03-12
Payer: MEDICARE

## 2021-03-12 ENCOUNTER — HOSPITAL ENCOUNTER (OUTPATIENT)
Age: 71
Discharge: HOME | End: 2021-03-12
Payer: MEDICARE

## 2021-03-12 ENCOUNTER — HOSPITAL ENCOUNTER (OUTPATIENT)
Dept: MRI IMAGING | Age: 71
Discharge: HOME | End: 2021-03-14
Payer: MEDICARE

## 2021-03-12 DIAGNOSIS — I50.32 CHRONIC DIASTOLIC HEART FAILURE (HCC): Primary | ICD-10-CM

## 2021-03-12 DIAGNOSIS — R29.6 MULTIPLE FALLS: ICD-10-CM

## 2021-03-12 DIAGNOSIS — M48.02 STENOSIS OF CERVICAL SPINE WITH MYELOPATHY (HCC): ICD-10-CM

## 2021-03-12 DIAGNOSIS — I50.32 CHRONIC DIASTOLIC HEART FAILURE (HCC): ICD-10-CM

## 2021-03-12 DIAGNOSIS — G99.2 STENOSIS OF CERVICAL SPINE WITH MYELOPATHY (HCC): ICD-10-CM

## 2021-03-12 LAB
CHOLESTEROL, FASTING: 140 MG/DL
CHOLESTEROL/HDL RATIO: 1.9
FOLATE: 11.5 NG/ML
HDLC SERPL-MCNC: 74 MG/DL
LDL CHOLESTEROL: 50 MG/DL (ref 0–130)
TRIGLYCERIDE, FASTING: 81 MG/DL
VITAMIN B-12: 803 PG/ML (ref 232–1245)
VLDLC SERPL CALC-MCNC: NORMAL MG/DL (ref 1–30)

## 2021-03-12 PROCEDURE — 80061 LIPID PANEL: CPT

## 2021-03-12 PROCEDURE — 72141 MRI NECK SPINE W/O DYE: CPT

## 2021-03-12 PROCEDURE — 36415 COLL VENOUS BLD VENIPUNCTURE: CPT

## 2021-03-15 NOTE — TELEPHONE ENCOUNTER
Pt called to give us update on BP.     Fri- 160/59 PM  Sat- 135/60 AM          123/66 PM  Sun- 102/72 AM          151/68 PM  Mon- 117/64 AM

## 2021-03-18 ENCOUNTER — IMMUNIZATION (OUTPATIENT)
Dept: PRIMARY CARE CLINIC | Age: 71
End: 2021-03-18
Payer: MEDICARE

## 2021-03-18 PROCEDURE — 0001A COVID-19, PFIZER VACCINE 30MCG/0.3ML DOSE: CPT | Performed by: INTERNAL MEDICINE

## 2021-03-18 PROCEDURE — 91300 COVID-19, PFIZER VACCINE 30MCG/0.3ML DOSE: CPT | Performed by: INTERNAL MEDICINE

## 2021-03-26 ENCOUNTER — OFFICE VISIT (OUTPATIENT)
Dept: INTERNAL MEDICINE CLINIC | Age: 71
End: 2021-03-26
Payer: MEDICARE

## 2021-03-26 VITALS
OXYGEN SATURATION: 97 % | HEIGHT: 63 IN | WEIGHT: 174 LBS | HEART RATE: 67 BPM | DIASTOLIC BLOOD PRESSURE: 64 MMHG | TEMPERATURE: 97.5 F | SYSTOLIC BLOOD PRESSURE: 130 MMHG | BODY MASS INDEX: 30.83 KG/M2

## 2021-03-26 DIAGNOSIS — M48.02 CERVICAL STENOSIS OF SPINAL CANAL: Primary | ICD-10-CM

## 2021-03-26 PROCEDURE — 99213 OFFICE O/P EST LOW 20 MIN: CPT | Performed by: NURSE PRACTITIONER

## 2021-03-26 RX ORDER — METHYLPREDNISOLONE 4 MG/1
TABLET ORAL
Qty: 1 KIT | Refills: 0 | Status: SHIPPED | OUTPATIENT
Start: 2021-03-26 | End: 2021-04-01

## 2021-03-26 NOTE — PROGRESS NOTES
Visit Information    Have you changed or started any medications since your last visit including any over-the-counter medicines, vitamins, or herbal medicines? no   Are you having any side effects from any of your medications? -  no  Have you stopped taking any of your medications? Is so, why? -  no    Have you seen any other physician or provider since your last visit? No  Have you had any other diagnostic tests since your last visit? Yes - Records Obtained  Have you been seen in the emergency room and/or had an admission to a hospital since we last saw you? No  Have you had your routine dental cleaning in the past 6 months? no    Have you activated your [employer] account? If not, what are your barriers?  Yes     Patient Care Team:  Jose Antonio Carney MD as PCP - General (Internal Medicine)  Jose Antonio Carney MD as PCP - [facility] Provider  Best Varner MD as Consulting Physician (Gastroenterology)    Medical History Review  Past Medical, Family, and Social History reviewed and does not contribute to the patient presenting condition    Health Maintenance   Topic Date Due    Diabetic foot exam  Never done    Diabetic retinal exam  Never done    DTaP/Tdap/Td vaccine (1 - Tdap) Never done    Shingles Vaccine (1 of 2) Never done    Breast cancer screen  08/04/2019    Potassium monitoring  06/25/2021    Creatinine monitoring  06/25/2021    Flu vaccine (Season Ended) 09/01/2021    Annual Wellness Visit (AWV)  12/04/2021    A1C test (Diabetic or Prediabetic)  02/22/2022    Lipid screen  03/12/2022    Colon cancer screen colonoscopy  10/07/2026    DEXA (modify frequency per FRAX score)  Completed    Pneumococcal 65+ years Vaccine  Completed    COVID-19 Vaccine  Completed    Hepatitis C screen  Completed    Hepatitis A vaccine  Aged Out    Hib vaccine  Aged Out    Meningococcal (ACWY) vaccine  Aged Out         [address],[facility] [address] [place] [address]  Dept: [phone]  Dept Fax: [phone]    OfficeProgress/Follow Up Note  Date of patient's visit: 4/13/2021  Patient's Name:  Lafe Castleman YOB: 1950            Patient Care Team:  Jose Angel Siddiqi MD as PCP - General (Internal Medicine)  Jose Angel Siddiqi MD as PCP - [facility] Empaneled Provider  Sixto Singh MD as Consulting Physician (Gastroenterology)    REASON FOR VISIT:Same day visit    HISTORY OF PRESENT ILLNESS:      Chief Complaint   Patient presents with    Neck Pain     been taking tylenol not helping     Diabetes     2/22/21 a1c was 5.2    Numbness     right leg, burning        History was obtained from the patient. Lafe Castleman is a 80 y.o. female here today for cervical neck aching radiating down shoulders into upper back. Reports 10/10 pain at night. Reporting right thigh numbness and burning. Denies back pain or loss of bowel or bladder function.      Patient Active Problem List   Diagnosis    Other specified disorders of rotator cuff syndrome of shoulder and allied disorders    Diverticulosis of large intestine    Intestinal or peritoneal adhesions with obstruction (postoperative) (postinfection) ([address]    Restless legs syndrome (RLS)    GERD (gastroesophageal reflux disease)    Essential hypertension    Mixed hyperlipidemia    Other abnormal glucose    Atherosclerosis    Allergic rhinitis    Vitamin D deficiency    Depression    Degenerative joint disease (DJD) of hip    Peripheral edema    Injury of foot, left    Facial droop    CVA (cerebral vascular accident) ([address]    Bradycardia    Ataxia    Aphasia    TIA (transient ischemic attack)    Need for prophylactic vaccination and inoculation against cholera alone    Osteopenia    Lumbago    Meralgia paresthetica of right side    Lumbar degenerative disc disease    Spondylosis of lumbar region without myelopathy or radiculopathy    Blood poisoning    Cellulitis of left lower extremity    Encounter for medication monitoring    Deep vein thrombosis (DVT) of right lower extremity (HCC)    Chronic deep vein thrombosis (DVT) of proximal vein of both lower extremities (HCC)    Chronic diastolic heart failure (HCC)    Neurogenic claudication due to lumbar spinal stenosis    Sacroiliitis (HCC)    Stage 3 chronic kidney disease    Type 2 diabetes mellitus with circulatory disorder, without long-term current use of insulin (HCC)    Chronic deep vein thrombosis (DVT) of popliteal vein of right lower extremity (HCC)    Closed fracture of left wrist    B12 deficiency    Iron deficiency anemia secondary to inadequate dietary iron intake    Closed head injury    Scalp laceration    Abnormal finding on imaging    Other irritable bowel syndrome    Frequent falls    Double vision    Muscle soreness    Peptic ulcer    Melena    PUD (peptic ulcer disease)    Absolute anemia    Acute blood loss anemia    Acute renal failure (HCC)    Clostridium difficile infection    Acquired spondylolisthesis    Spinal stenosis of lumbar region with neurogenic claudication    Acute deep vein thrombosis (DVT) of proximal vein of left lower extremity (HCC)    Leg swelling    Back pain    Neurological disorder    Closed unstable burst fracture of fifth lumbar vertebra with routine healing    Slow transit constipation    Major depressive disorder, recurrent, moderate (HCC)    Acute deep vein thrombosis (DVT) of femoral vein of left lower extremity (HCC)    Cervical stenosis of spinal canal       MEDICATIONS:      Current Outpatient Medications   Medication Sig Dispense Refill    fenofibrate micronized (LOFIBRA) 134 MG capsule Take 1 capsule by mouth every morning (before breakfast) 90 capsule 3    citalopram (CELEXA) 10 MG tablet Take 1 tablet by mouth daily 90 tablet 3    amLODIPine (NORVASC) 5 MG tablet Take 1 tablet by mouth daily 90 tablet 0    pantoprazole (PROTONIX) 40 MG tablet Take 1 tablet by mouth 2 times daily (before meals) 180 tablet 3    carvedilol (COREG) 12.5 MG tablet Take 1 tablet by mouth 2 times daily 180 tablet 3    SITagliptin (JANUVIA) 100 MG tablet Take 0.5 tablets by mouth daily 90 tablet 3    cyanocobalamin (CVS VITAMIN B12) 1000 MCG tablet Take 1 tablet by mouth daily 30 tablet 3    furosemide (LASIX) 40 MG tablet Take 1 tablet by mouth 2 times daily 180 tablet 3    ferrous sulfate (IRON 325) 325 (65 Fe) MG tablet Take 1 tablet by mouth 2 times daily 90 tablet 2    ondansetron (ZOFRAN) 4 MG tablet Take 4 mg by mouth daily as needed for Nausea or Vomiting      VITAMIN D, ERGOCALCIFEROL, PO Take by mouth      Lancets MISC 1 each by Does not apply route daily 100 each 3    blood glucose monitor strips Test 2 times a day & as needed for symptoms of irregular blood glucose. 100 strip 5    lidocaine (XYLOCAINE) 5 % ointment 4-5 times a day to your right thigh for pain. 240 g 3    nitroGLYCERIN (NITROSTAT) 0.4 MG SL tablet Place 1 tablet under the tongue every 5 minutes as needed for Chest pain 75 tablet 3    Calcium Carbonate-Vitamin D (CALCIUM 500/D) 500-125 MG-UNIT TABS Take 1 tablet by mouth 2 times daily       diphenhydrAMINE HCl (BENADRYL ALLERGY PO) Take by mouth      methocarbamol (ROBAXIN-750) 750 MG tablet Take 3 times a day as need for spasm/pain 60 tablet 1    lisinopril (PRINIVIL;ZESTRIL) 30 MG tablet Take 1 tablet by mouth daily 90 tablet 0    atorvastatin (LIPITOR) 80 MG tablet Take 0.5 tablets by mouth nightly 90 tablet 3     No current facility-administered medications for this visit. ALLERGIES:      Allergies   Allergen Reactions    Mobic [Meloxicam]     Bactrim [Sulfamethoxazole-Trimethoprim] Other (See Comments)     sepsis    Codeine Itching    Seasonal        SOCIAL HISTORY   Reviewed and no change from previous record. Keya Martinez  reports that she quit smoking about 3 years ago. Her smoking use included cigarettes.  She started smoking about 25 years

## 2021-04-01 RX ORDER — ATORVASTATIN CALCIUM 80 MG/1
40 TABLET, FILM COATED ORAL NIGHTLY
Qty: 90 TABLET | Refills: 3 | Status: SHIPPED | OUTPATIENT
Start: 2021-04-01 | End: 2021-07-22 | Stop reason: SDUPTHER

## 2021-04-08 ENCOUNTER — OFFICE VISIT (OUTPATIENT)
Dept: NEUROSURGERY | Age: 71
End: 2021-04-08
Payer: MEDICARE

## 2021-04-08 ENCOUNTER — IMMUNIZATION (OUTPATIENT)
Dept: PRIMARY CARE CLINIC | Age: 71
End: 2021-04-08
Payer: MEDICARE

## 2021-04-08 VITALS
OXYGEN SATURATION: 96 % | BODY MASS INDEX: 30.83 KG/M2 | TEMPERATURE: 96.6 F | SYSTOLIC BLOOD PRESSURE: 135 MMHG | DIASTOLIC BLOOD PRESSURE: 63 MMHG | HEIGHT: 63 IN | HEART RATE: 68 BPM | RESPIRATION RATE: 16 BRPM | WEIGHT: 174 LBS

## 2021-04-08 DIAGNOSIS — G99.2 STENOSIS OF CERVICAL SPINE WITH MYELOPATHY (HCC): Primary | ICD-10-CM

## 2021-04-08 DIAGNOSIS — I10 HYPERTENSION, UNSPECIFIED TYPE: ICD-10-CM

## 2021-04-08 DIAGNOSIS — M48.02 STENOSIS OF CERVICAL SPINE WITH MYELOPATHY (HCC): Primary | ICD-10-CM

## 2021-04-08 PROCEDURE — 0002A COVID-19, PFIZER VACCINE 30MCG/0.3ML DOSE: CPT | Performed by: INTERNAL MEDICINE

## 2021-04-08 PROCEDURE — 91300 COVID-19, PFIZER VACCINE 30MCG/0.3ML DOSE: CPT | Performed by: INTERNAL MEDICINE

## 2021-04-08 PROCEDURE — 99214 OFFICE O/P EST MOD 30 MIN: CPT | Performed by: NEUROLOGICAL SURGERY

## 2021-04-08 RX ORDER — METHOCARBAMOL 750 MG/1
TABLET, FILM COATED ORAL
Qty: 60 TABLET | Refills: 1 | Status: SHIPPED | OUTPATIENT
Start: 2021-04-08 | End: 2021-07-20 | Stop reason: ALTCHOICE

## 2021-04-08 RX ORDER — LISINOPRIL 30 MG/1
30 TABLET ORAL DAILY
Qty: 90 TABLET | Refills: 0 | Status: SHIPPED | OUTPATIENT
Start: 2021-04-08 | End: 2021-07-22 | Stop reason: SDUPTHER

## 2021-04-08 NOTE — PROGRESS NOTES
Department of Neurosurgery                                                      Follow up visit      History Obtained From:  patient    CHIEF COMPLAINT:         Chief Complaint   Patient presents with    Follow-up     MRI cervical flex/ ex prior       HISTORY OF PRESENT ILLNESS:       The patient is a 80 y.o. female who presents for follow up for cervical myelopathy. She saw Nereyda Salgado previously. To summarized, she has a year of progressive gait disturbance, frequent falls, left leg weakness, loss of hand dexterity, and leg stiffness.      PAST MEDICAL HISTORY :       Past Medical History:        Diagnosis Date    Allergic rhinitis, cause unspecified     Back pain     lumbar    Bowel obstruction (HCC)     history of due to scar tissue, resolved non-surgically    C. difficile diarrhea     CAD (coronary artery disease)     Cellulitis     left leg    Cellulitis 2017 August    leg left leg/bug bite    Cerebral artery occlusion with cerebral infarction [facility])     TIA    Diverticulosis of colon (without mention of hemorrhage)     GERD (gastroesophageal reflux disease)     History of MI (myocardial infarction) 2005    thought due to a blood clot    History of ovarian cyst 1970    had oopherectomy holly    History of peritonitis 1968    due to ruptured appendix age 13    HTN (hypertension)     Hx of blood clots     right leg    Hyperlipidemia     Intestinal or peritoneal adhesions with obstruction (postoperative) (postinfection) ([address]     Kidney infection     renal failure/sepsis/spider bite    Lateral epicondylitis  of elbow     MDRO (multiple drug resistant organisms) resistance     c diff    Muscle strain     right posterior shoulder    Other abnormal glucose     PONV (postoperative nausea and vomiting)     dry heaves    Pre-diabetes     Restless legs syndrome (RLS)     TIA (transient ischemic attack) 2014    Vitamin D deficiency     Wears glasses        Past Surgical History: Procedure Laterality Date    ABDOMEN SURGERY  1976    benign tumor removed near remaining ovary, 1.5 pounds    APPENDECTOMY  1968    appendix ruptured, developed peritonitis    BUNIONECTOMY Left     along with calcium deposits removed   [address]  2005    negative    COLECTOMY  1969    12 INCHES REMOVED D/T OBSTRUCTION    COLONOSCOPY      CYST REMOVAL Right     right facial    HYSTERECTOMY  1973    taken as a result of recurring cysts    LUMBAR FUSION N/A 2/10/2020    LUMBAR L4-5 POSTERIOR  DECOMPRESSION INSTRUMENTATION FUSION WCEMENT AUGMENTATION/ performed by Kasandra De Jesus MD at [facility] N/A 6/17/2020    L5-S1 PLIF L4-L5 REVISION performed by Kasandra De Jesus MD at [place] [id number]  8/14/14    FESS    OVARY REMOVAL  1970    UNILATERAL due to cyst    OVARY REMOVAL  1971    partial, due to cyst   Orest Rob SINUS SURGERY  2004    UPPER GASTROINTESTINAL ENDOSCOPY N/A 5/31/2019    EGD ESOPHAGOGASTRODUODENOSCOPY performed by Crescencio Serna MD at [facility] N/A 8/5/2019    EGD BIOPSY performed by Tanvir Iverson MD at [facility] N/A 8/23/2019    EGD BIOPSY performed by Crescencio Serna MD at [facility] 3/5/2019    WRIST OPEN REDUCTION INTERNAL FIXATION performed by Yesenia Collins MD at [address] [facility] History:   Social History     Socioeconomic History    Marital status:      Spouse name: Not on file    Number of children: Not on file    Years of education: Not on file    Highest education level: Not on file   Occupational History    Occupation: retired   Social Needs    Financial resource strain: Not on file    Food insecurity     Worry: Not on file     Inability: Not on file   [employer] needs     Medical: Not on file     Non-medical: Not on file   Tobacco Use    Smoking status: Former Smoker     Packs/day: 0.50     Years: 20.00     Pack years: 10.00     Types: Cigarettes     Start date: 8/11/1995     Quit date: 6/20/2017     Years since quitting: 3.8    Smokeless tobacco: Never Used   Substance and Sexual Activity    Alcohol use: No     Alcohol/week: 0.0 standard drinks    Drug use: No    Sexual activity: Not on file   Lifestyle    Physical activity     Days per week: Not on file     Minutes per session: Not on file    Stress: Not on file   Relationships    Social connections     Talks on phone: Patient refused     Gets together: Patient refused     Attends Church service: Patient refused     Active member of club or organization: Patient refused     Attends meetings of clubs or organizations: Patient refused     Relationship status: Patient refused    Intimate partner violence     Fear of current or ex partner: Not on file     Emotionally abused: Not on file     Physically abused: Not on file     Forced sexual activity: Not on file   Other Topics Concern    Not on file   Social History Narrative    Not on file       Family History:       Problem Relation Age of Onset    Stroke Mother     Diabetes Mother     Heart Disease Mother     High Blood Pressure Mother     Heart Disease Father     Heart Disease Brother     High Blood Pressure Brother     Heart Disease Maternal Grandmother     High Blood Pressure Sister        Allergies:  Mobic [meloxicam], Bactrim [sulfamethoxazole-trimethoprim], Codeine, and Seasonal    Home Medications:  Prior to Admission medications    Medication Sig Start Date End Date Taking?  Authorizing Provider   diphenhydrAMINE HCl (BENADRYL ALLERGY PO) Take by mouth   Yes Historical Provider, MD   atorvastatin (LIPITOR) 80 MG tablet Take 0.5 tablets by mouth nightly 4/1/21  Yes Lili Tijerina MD   fenofibrate micronized (LOFIBRA) 134 MG capsule Take 1 capsule by mouth every morning (before breakfast) 3/11/21  Yes Lili Tijerina MD   citalopram (CELEXA) 10 MG tablet Take 1 tablet by mouth daily 2/26/21  Yes Francisco Killian MD   amLODIPine (NORVASC) 5 MG tablet Take 1 tablet by mouth daily 2/22/21  Yes Francisco Killian MD   pantoprazole (PROTONIX) 40 MG tablet Take 1 tablet by mouth 2 times daily (before meals) 2/11/21  Yes Francisco Killian MD   carvedilol (COREG) 12.5 MG tablet Take 1 tablet by mouth 2 times daily 1/19/21  Yes Francisco Killian MD   SITagliptin (JANUVIA) 100 MG tablet Take 0.5 tablets by mouth daily 1/4/21  Yes Francisco Killian MD   cyanocobalamin ([employer] VITAMIN B12) 1000 MCG tablet Take 1 tablet by mouth daily 12/3/20  Yes Francisco Killian MD   lisinopril (PRINIVIL;ZESTRIL) 30 MG tablet Take 1 tablet by mouth daily 10/21/20  Yes Francisco Killian MD   furosemide (LASIX) 40 MG tablet Take 1 tablet by mouth 2 times daily 9/1/20  Yes Francisco Killian MD   ferrous sulfate (IRON 325) 325 (65 Fe) MG tablet Take 1 tablet by mouth 2 times daily 7/2/20  Yes Damaso London MD   ondansetron (ZOFRAN) 4 MG tablet Take 4 mg by mouth daily as needed for Nausea or Vomiting   Yes Historical Provider, MD   VITAMIN D, ERGOCALCIFEROL, PO Take by mouth   Yes Historical Provider, MD   Lancets MISC 1 each by Does not apply route daily 10/10/19  Yes Marvin Andrade MD   blood glucose monitor strips Test 2 times a day & as needed for symptoms of irregular blood glucose. 10/10/19  Yes Marvin Andrade MD   lidocaine (XYLOCAINE) 5 % ointment 4-5 times a day to your right thigh for pain. 3/15/19  Yes Cher Monroy MD   nitroGLYCERIN (NITROSTAT) 0.4 MG SL tablet Place 1 tablet under the tongue every 5 minutes as needed for Chest pain 1/8/18  Yes Marivn Andrade MD   Calcium Carbonate-Vitamin D (CALCIUM 500/D) 500-125 MG-UNIT TABS Take 1 tablet by mouth 2 times daily    Yes Historical Provider, MD       Current Medications:   No current facility-administered medications for this visit.        PHYSICAL EXAM:       /63   Pulse 68   Temp 96.6 °F (35.9 °C)   Resp 16   Ht 5' 3\" (1.6 m)   Wt 174 lb (78.9 kg)   SpO2 96%   BMI 30.82 kg/m²   Physical Exam     Gen: NAD  HEENT: moist mucus membranes  Cardio: RRR  Pulm: chest rise symmetrically  GI: abd soft  Ext: no edema  Skin: warm    Neuro:    AOX3  CN 2-12 grossly intact  Speech articulate  Motor 5/5  No pronator drift  Sensation symmetrical   Shuffling gait  No wright  DTR 2+      Radiology Review:  Cervical MRI:  \"  Multilevel degenerative disc disease with associated uncovertebral and facet   hypertrophy with canal stenosis most pronounced at C3-4 and C5-6.       Bilateral foraminal narrowing as described above.      \"      ASSESSMENT AND PLAN:       Patient Active Problem List   Diagnosis    Other specified disorders of rotator cuff syndrome of shoulder and allied disorders    Diverticulosis of large intestine    Intestinal or peritoneal adhesions with obstruction (postoperative) (postinfection) (HCC)    Restless legs syndrome (RLS)    GERD (gastroesophageal reflux disease)    Essential hypertension    Mixed hyperlipidemia    Other abnormal glucose    Atherosclerosis    Allergic rhinitis    Vitamin D deficiency    Depression    Degenerative joint disease (DJD) of hip    Peripheral edema    Injury of foot, left    Facial droop    CVA (cerebral vascular accident) ([address]    Bradycardia    Ataxia    Aphasia    TIA (transient ischemic attack)    Need for prophylactic vaccination and inoculation against cholera alone    Osteopenia    Lumbago    Meralgia paresthetica of right side    Lumbar degenerative disc disease    Spondylosis of lumbar region without myelopathy or radiculopathy    Blood poisoning    Cellulitis of left lower extremity    Encounter for medication monitoring    Deep vein thrombosis (DVT) of right lower extremity ([address]    Chronic deep vein thrombosis (DVT) of proximal vein of both lower extremities ([facility])    Chronic diastolic heart failure ([address]    Neurogenic claudication due to lumbar spinal stenosis    Sacroiliitis (HCC)    Stage 3 chronic kidney disease    Type 2 diabetes mellitus with circulatory disorder, without long-term current use of insulin (HCC)    Chronic deep vein thrombosis (DVT) of popliteal vein of right lower extremity (HCC)    Closed fracture of left wrist    B12 deficiency    Iron deficiency anemia secondary to inadequate dietary iron intake    Closed head injury    Scalp laceration    Abnormal finding on imaging    Other irritable bowel syndrome    Frequent falls    Double vision    Muscle soreness    Peptic ulcer    Melena    PUD (peptic ulcer disease)    Absolute anemia    Acute blood loss anemia    Acute renal failure (HCC)    Clostridium difficile infection    Acquired spondylolisthesis    Spinal stenosis of lumbar region with neurogenic claudication    Acute deep vein thrombosis (DVT) of proximal vein of left lower extremity (HCC)    Leg swelling    Back pain    Neurological disorder    Closed unstable burst fracture of fifth lumbar vertebra with routine healing    Slow transit constipation    Major depressive disorder, recurrent, moderate (HCC)    Acute deep vein thrombosis (DVT) of femoral vein of left lower extremity (HCC)    Cervical stenosis of spinal canal         A/P:  This is a 80 y.o. female with progressive cervical myelopathy at multiple levels. I recommend surgical decompression of her cord, the most efficient approach would be posterior cervical laminectomy and fusion from C3-6 with partial C2 laminectomy      Patient and/or family was counseled on the diagnosis and treatment plan. I showed her scan and used a model and discussed risk, benefits, alternatives. Deniz Hernandez DO     Board Certified Neurosurgeon  4/8/2021  12:16 PM      This note was created using voice recognition software. There may be inaccuracies of transcription  that are inadvertently overlooked prior to the signature.   There is any questions about the transcription please contact me.

## 2021-04-08 NOTE — TELEPHONE ENCOUNTER
Medication: Lisinopril  Last visit: 3/26/21  Next visit: 4/22/2021  Last refill: 10/21/20  Pharmacy: [address] [place] [address]

## 2021-04-09 ENCOUNTER — TELEPHONE (OUTPATIENT)
Dept: NEUROLOGY | Age: 71
End: 2021-04-09

## 2021-04-09 NOTE — TELEPHONE ENCOUNTER
Dr. Ruiz Self called the office this morning stating that she had her appointment with Dr. Arturo Wade yesterday. He is going to do surgery in May on her neck and wanted the patient to check if you still wanted her to have the MRI brain that is scheduled for 4/21/2021. Please advise.

## 2021-04-12 ENCOUNTER — HOSPITAL ENCOUNTER (OUTPATIENT)
Dept: LAB | Age: 71
Setting detail: SPECIMEN
Discharge: HOME | End: 2021-04-12
Payer: MEDICARE

## 2021-04-12 ENCOUNTER — TELEPHONE (OUTPATIENT)
Dept: NEUROLOGY | Age: 71
End: 2021-04-12

## 2021-04-12 DIAGNOSIS — Z01.818 PRE-OP TESTING: Primary | ICD-10-CM

## 2021-04-12 PROCEDURE — U0005 INFEC AGEN DETEC AMPLI PROBE: HCPCS

## 2021-04-12 PROCEDURE — U0003 INFECTIOUS AGENT DETECTION BY NUCLEIC ACID (DNA OR RNA); SEVERE ACUTE RESPIRATORY SYNDROME CORONAVIRUS 2 (SARS-COV-2) (CORONAVIRUS DISEASE [COVID-19]), AMPLIFIED PROBE TECHNIQUE, MAKING USE OF HIGH THROUGHPUT TECHNOLOGIES AS DESCRIBED BY CMS-2020-01-R: HCPCS

## 2021-04-12 NOTE — TELEPHONE ENCOUNTER
Patient called in and states Dr. Nghia Driscoll told her she will not need the MRI you order because she is having surgery may 21 on her neck. Is this ok with you . Please advise.      Thank you

## 2021-04-13 ASSESSMENT — ENCOUNTER SYMPTOMS
DIARRHEA: 0
SHORTNESS OF BREATH: 0
COUGH: 0
CONSTIPATION: 0
TROUBLE SWALLOWING: 0
CHEST TIGHTNESS: 0
NAUSEA: 0
RHINORRHEA: 0
COLOR CHANGE: 0
SORE THROAT: 0
VOMITING: 0
WHEEZING: 0

## 2021-04-14 LAB
SARS-COV-2: NORMAL
SARS-COV-2: NOT DETECTED
SOURCE: NORMAL

## 2021-04-15 ENCOUNTER — TELEPHONE (OUTPATIENT)
Dept: PRIMARY CARE CLINIC | Age: 71
End: 2021-04-15

## 2021-04-15 DIAGNOSIS — I10 ESSENTIAL HYPERTENSION: ICD-10-CM

## 2021-04-15 RX ORDER — AMLODIPINE BESYLATE 5 MG/1
5 TABLET ORAL DAILY
Qty: 90 TABLET | Refills: 3 | Status: SHIPPED | OUTPATIENT
Start: 2021-04-15 | End: 2021-09-08 | Stop reason: SDUPTHER

## 2021-04-15 RX ORDER — CARVEDILOL 12.5 MG/1
12.5 TABLET ORAL 2 TIMES DAILY
Qty: 180 TABLET | Refills: 3 | Status: SHIPPED | OUTPATIENT
Start: 2021-04-15 | End: 2021-08-16 | Stop reason: SDUPTHER

## 2021-04-15 NOTE — TELEPHONE ENCOUNTER
Medication: Norvasc, Coreg, Januvia  Last visit: 3/26/21  Next visit: 4/22/2021  Last refill: Norvasc 2/22/21, Coreg 1/19/21, Januvia 1/4/21  Pharmacy: [facility]

## 2021-04-16 ENCOUNTER — HOSPITAL ENCOUNTER (OUTPATIENT)
Dept: GENERAL RADIOLOGY | Age: 71
Discharge: HOME | End: 2021-04-18
Payer: MEDICARE

## 2021-04-16 DIAGNOSIS — R13.10 DYSPHAGIA, UNSPECIFIED TYPE: ICD-10-CM

## 2021-04-16 PROCEDURE — 74230 X-RAY XM SWLNG FUNCJ C+: CPT

## 2021-04-16 PROCEDURE — 92611 MOTION FLUOROSCOPY/SWALLOW: CPT

## 2021-04-19 PROBLEM — G99.2 STENOSIS OF CERVICAL SPINE WITH MYELOPATHY (HCC): Status: ACTIVE | Noted: 2020-11-16

## 2021-04-22 ENCOUNTER — OFFICE VISIT (OUTPATIENT)
Dept: INTERNAL MEDICINE CLINIC | Age: 71
End: 2021-04-22
Payer: MEDICARE

## 2021-04-22 VITALS
SYSTOLIC BLOOD PRESSURE: 124 MMHG | TEMPERATURE: 97.4 F | HEIGHT: 63 IN | BODY MASS INDEX: 31.86 KG/M2 | WEIGHT: 179.8 LBS | DIASTOLIC BLOOD PRESSURE: 60 MMHG

## 2021-04-22 DIAGNOSIS — F32.A DEPRESSION, UNSPECIFIED DEPRESSION TYPE: ICD-10-CM

## 2021-04-22 DIAGNOSIS — Z13.31 POSITIVE DEPRESSION SCREENING: ICD-10-CM

## 2021-04-22 DIAGNOSIS — M48.02 CERVICAL STENOSIS OF SPINAL CANAL: ICD-10-CM

## 2021-04-22 DIAGNOSIS — E78.5 HYPERLIPIDEMIA, UNSPECIFIED HYPERLIPIDEMIA TYPE: ICD-10-CM

## 2021-04-22 DIAGNOSIS — I50.32 CHRONIC DIASTOLIC HEART FAILURE (HCC): ICD-10-CM

## 2021-04-22 DIAGNOSIS — E11.59 TYPE 2 DIABETES MELLITUS WITH OTHER CIRCULATORY COMPLICATION, WITHOUT LONG-TERM CURRENT USE OF INSULIN (HCC): ICD-10-CM

## 2021-04-22 DIAGNOSIS — F33.1 MAJOR DEPRESSIVE DISORDER, RECURRENT, MODERATE (HCC): ICD-10-CM

## 2021-04-22 PROCEDURE — G8431 POS CLIN DEPRES SCRN F/U DOC: HCPCS | Performed by: INTERNAL MEDICINE

## 2021-04-22 PROCEDURE — 99214 OFFICE O/P EST MOD 30 MIN: CPT | Performed by: INTERNAL MEDICINE

## 2021-04-22 RX ORDER — CITALOPRAM 20 MG/1
20 TABLET ORAL DAILY
Qty: 90 TABLET | Refills: 0 | Status: SHIPPED | OUTPATIENT
Start: 2021-04-22 | End: 2021-09-28 | Stop reason: SDUPTHER

## 2021-04-22 ASSESSMENT — ENCOUNTER SYMPTOMS
ABDOMINAL PAIN: 0
CONSTIPATION: 0
ABDOMINAL DISTENTION: 0
SHORTNESS OF BREATH: 0
CHEST TIGHTNESS: 0
DIARRHEA: 0
EYE REDNESS: 0
CHOKING: 0
COUGH: 0
COLOR CHANGE: 0
BLOOD IN STOOL: 0
EYE ITCHING: 0
BACK PAIN: 0
APNEA: 0
EYE PAIN: 0
EYE DISCHARGE: 0

## 2021-04-22 ASSESSMENT — PATIENT HEALTH QUESTIONNAIRE - PHQ9
3. TROUBLE FALLING OR STAYING ASLEEP: 0
8. MOVING OR SPEAKING SO SLOWLY THAT OTHER PEOPLE COULD HAVE NOTICED. OR THE OPPOSITE, BEING SO FIGETY OR RESTLESS THAT YOU HAVE BEEN MOVING AROUND A LOT MORE THAN USUAL: 3
SUM OF ALL RESPONSES TO PHQ QUESTIONS 1-9: 12
7. TROUBLE CONCENTRATING ON THINGS, SUCH AS READING THE NEWSPAPER OR WATCHING TELEVISION: 0
SUM OF ALL RESPONSES TO PHQ9 QUESTIONS 1 & 2: 3
9. THOUGHTS THAT YOU WOULD BE BETTER OFF DEAD, OR OF HURTING YOURSELF: 0
SUM OF ALL RESPONSES TO PHQ QUESTIONS 1-9: 12
6. FEELING BAD ABOUT YOURSELF - OR THAT YOU ARE A FAILURE OR HAVE LET YOURSELF OR YOUR FAMILY DOWN: 3
SUM OF ALL RESPONSES TO PHQ QUESTIONS 1-9: 12

## 2021-04-22 ASSESSMENT — COLUMBIA-SUICIDE SEVERITY RATING SCALE - C-SSRS: 1. WITHIN THE PAST MONTH, HAVE YOU WISHED YOU WERE DEAD OR WISHED YOU COULD GO TO SLEEP AND NOT WAKE UP?: NO

## 2021-04-22 NOTE — PROGRESS NOTES
Subjective:      Patient ID: Jayde Davies is a 80 y.o. female. HPI- patient has H/o Multiple medical Problems , she has Dm, HTN, HLD, has H/o TIA In past , Multiple falls in past , She underwent back surgery with Vertebral Fracture   MRI cervical spine Suggestive of Cervical Stenosis with Myelopathy , plan for  posterior cervical laminectomy and fusion from C3-6 with partial C2 laminectomy, on may 21   No H/o CAD , has CHF, Grade 1 HFPEF   Can walk 1 block easy with help of walker   cannot climb Flight of stairs   Wants to get Medication for Depression increased   Has Chronic swelling of Left leg     Review of Systems   Constitutional: Negative for activity change, appetite change, chills, diaphoresis, fatigue and fever. HENT: Negative for congestion, dental problem, drooling and ear discharge. Eyes: Negative for pain, discharge, redness and itching. Respiratory: Negative for apnea, cough, choking, chest tightness and shortness of breath. Cardiovascular: Negative for chest pain and leg swelling. Gastrointestinal: Negative for abdominal distention, abdominal pain, blood in stool, constipation and diarrhea. Endocrine: Negative for cold intolerance and heat intolerance. Genitourinary: Negative for difficulty urinating, dysuria, enuresis, flank pain and frequency. Musculoskeletal: Positive for gait problem (uses walker ), neck pain and neck stiffness. Negative for arthralgias, back pain and joint swelling. Skin: Negative for color change, pallor and rash. Neurological: Negative for dizziness, facial asymmetry, light-headedness, numbness and headaches. Psychiatric/Behavioral: Positive for dysphoric mood. Negative for agitation, behavioral problems, confusion and decreased concentration. Objective:   Physical Exam  Constitutional:       Appearance: She is well-developed. She is not diaphoretic. HENT:      Head: Normocephalic and atraumatic.       Mouth/Throat:      Pharynx: No oropharyngeal exudate. Eyes:      General: No scleral icterus. Right eye: No discharge. Left eye: No discharge. Conjunctiva/sclera: Conjunctivae normal.      Pupils: Pupils are equal, round, and reactive to light. Neck:      Musculoskeletal: Normal range of motion and neck supple. Thyroid: No thyromegaly. Vascular: No JVD. Trachea: No tracheal deviation. Cardiovascular:      Rate and Rhythm: Normal rate. Heart sounds: Murmur (systolic in AOrtic area ) present. No gallop. Pulmonary:      Effort: Pulmonary effort is normal. No respiratory distress. Breath sounds: Normal breath sounds. No stridor. No wheezing or rales. Chest:      Chest wall: No tenderness. Abdominal:      General: Bowel sounds are normal. There is no distension. Palpations: Abdomen is soft. Tenderness: There is no abdominal tenderness. There is no guarding or rebound. Musculoskeletal: Normal range of motion. General: Swelling (left leg ) present. Neurological:      Mental Status: She is alert and oriented to person, place, and time. Assessment / Plan:   1. Depression, unspecified depression type  Increasing dose of Celexa   - citalopram (CELEXA) 20 MG tablet; Take 1 tablet by mouth daily  Dispense: 90 tablet; Refill: 0    2. Chronic diastolic heart failure ([address]  Compensated   3. Major depressive disorder, recurrent, moderate (HCC)  Increasing celexa     4. Hyperlipidemia, unspecified hyperlipidemia typ  REcent Lipid panel is ok     5. Type 2 diabetes mellitus with other circulatory complication, without long-term current use of insulin (HCC)  Controlled     6. Cervical stenosis of spinal canal  Plan for surgery     Patient mentioned that she was Told that she has Chronic Murmur   Last ECHO In 2018     · Return in about 3 months (around 7/22/2021). · Reviewed prior labs and health maintenance.       · Discussed use, benefit, and side effects of prescribed medications. Barriers to medication compliance addressed. All patient questions answered. Pt voiced understanding. Cherelle Toscano MD  [facility]  4/22/2021, 12:58 PM    Please note that this chart was generated using voice recognition Dragon dictation software. Although every effort was made to ensure the accuracy of this automated transcription, some errors in transcription may have occurred. Visit Information    Have you changed or started any medications since your last visit including any over-the-counter medicines, vitamins, or herbal medicines? no   Are you having any side effects from any of your medications? -  no  Have you stopped taking any of your medications? Is so, why? -  no    Have you seen any other physician or provider since your last visit? Yes - Records Requested  Have you had any other diagnostic tests since your last visit? No  Have you been seen in the emergency room and/or had an admission to a hospital since we last saw you? No  Have you had your routine dental cleaning in the past 6 months? yes -     Have you activated your [email or web address] account? If not, what are your barriers?  Yes     Patient Care Team:  Cherelle Toscano MD as PCP - General (Internal Medicine)  Cherelle Toscano MD as PCP [facility]  Femi Lockett MD as Consulting Physician (Gastroenterology)    Medical History Review  Past Medical, Family, and Social History reviewed and does not contribute to the patient presenting condition    Health Maintenance   Topic Date Due    Diabetic foot exam  Never done    Diabetic retinal exam  Never done    DTaP/Tdap/Td vaccine (1 - Tdap) Never done    Shingles Vaccine (1 of 2) Never done    Breast cancer screen  08/04/2019    Potassium monitoring  06/25/2021    Creatinine monitoring  06/25/2021    Flu vaccine (Season Ended) 09/01/2021    Annual Wellness Visit (AWV)  12/04/2021    A1C test (Diabetic or Prediabetic)  02/22/2022    Lipid screen  03/12/2022  Colon cancer screen colonoscopy  10/07/2026    DEXA (modify frequency per FRAX score)  Completed    Pneumococcal 65+ years Vaccine  Completed    COVID-19 Vaccine  Completed    Hepatitis C screen  Completed    Hepatitis A vaccine  Aged Out    Hib vaccine  Aged Out    Meningococcal (ACWY) vaccine  Aged Out

## 2021-05-05 RX ORDER — SODIUM CHLORIDE, SODIUM LACTATE, POTASSIUM CHLORIDE, CALCIUM CHLORIDE 600; 310; 30; 20 MG/100ML; MG/100ML; MG/100ML; MG/100ML
1000 INJECTION, SOLUTION INTRAVENOUS CONTINUOUS
Status: CANCELLED | OUTPATIENT
Start: 2021-05-05

## 2021-05-06 ENCOUNTER — HOSPITAL ENCOUNTER (OUTPATIENT)
Dept: WOMENS IMAGING | Age: 71
Discharge: HOME | End: 2021-05-08
Payer: MEDICARE

## 2021-05-06 DIAGNOSIS — Z12.31 ENCOUNTER FOR SCREENING MAMMOGRAM FOR BREAST CANCER: ICD-10-CM

## 2021-05-06 PROCEDURE — 77063 BREAST TOMOSYNTHESIS BI: CPT

## 2021-05-07 ENCOUNTER — HOSPITAL ENCOUNTER (OUTPATIENT)
Dept: PREADMISSION TESTING | Age: 71
Discharge: HOME | End: 2021-05-11
Payer: MEDICARE

## 2021-05-07 VITALS
BODY MASS INDEX: 31.56 KG/M2 | RESPIRATION RATE: 20 BRPM | TEMPERATURE: 96.1 F | SYSTOLIC BLOOD PRESSURE: 163 MMHG | HEIGHT: 63 IN | DIASTOLIC BLOOD PRESSURE: 90 MMHG | OXYGEN SATURATION: 97 % | HEART RATE: 62 BPM | WEIGHT: 178.12 LBS

## 2021-05-07 LAB
ABO/RH: NORMAL
ANION GAP SERPL CALCULATED.3IONS-SCNC: 17 MMOL/L (ref 9–17)
ANTIBODY SCREEN: NEGATIVE
ARM BAND NUMBER: NORMAL
BUN BLDV-MCNC: 64 MG/DL (ref 8–23)
CHLORIDE BLD-SCNC: 103 MMOL/L (ref 98–107)
CO2: 22 MMOL/L (ref 20–31)
CREAT SERPL-MCNC: 1.52 MG/DL (ref 0.5–0.9)
EXPIRATION DATE: NORMAL
GFR AFRICAN AMERICAN: 41 ML/MIN
GFR NON-AFRICAN AMERICAN: 34 ML/MIN
GFR SERPL CREATININE-BSD FRML MDRD: ABNORMAL ML/MIN/{1.73_M2}
GFR SERPL CREATININE-BSD FRML MDRD: ABNORMAL ML/MIN/{1.73_M2}
GLUCOSE BLD-MCNC: 134 MG/DL (ref 70–99)
HCT VFR BLD CALC: 39.1 % (ref 36.3–47.1)
HEMOGLOBIN: 12.4 G/DL (ref 11.9–15.1)
MCH RBC QN AUTO: 31.8 PG (ref 25.2–33.5)
MCHC RBC AUTO-ENTMCNC: 31.7 G/DL (ref 28.4–34.8)
MCV RBC AUTO: 100.3 FL (ref 82.6–102.9)
NRBC AUTOMATED: 0 PER 100 WBC
PDW BLD-RTO: 13.3 % (ref 11.8–14.4)
PLATELET # BLD: 276 K/UL (ref 138–453)
PMV BLD AUTO: 9.5 FL (ref 8.1–13.5)
POTASSIUM SERPL-SCNC: 4.2 MMOL/L (ref 3.7–5.3)
RBC # BLD: 3.9 M/UL (ref 3.95–5.11)
SODIUM BLD-SCNC: 142 MMOL/L (ref 135–144)
WBC # BLD: 8.8 K/UL (ref 3.5–11.3)

## 2021-05-07 PROCEDURE — 82565 ASSAY OF CREATININE: CPT

## 2021-05-07 PROCEDURE — 36415 COLL VENOUS BLD VENIPUNCTURE: CPT

## 2021-05-07 PROCEDURE — 82947 ASSAY GLUCOSE BLOOD QUANT: CPT

## 2021-05-07 PROCEDURE — 86900 BLOOD TYPING SEROLOGIC ABO: CPT

## 2021-05-07 PROCEDURE — 86901 BLOOD TYPING SEROLOGIC RH(D): CPT

## 2021-05-07 PROCEDURE — 80051 ELECTROLYTE PANEL: CPT

## 2021-05-07 PROCEDURE — 85027 COMPLETE CBC AUTOMATED: CPT

## 2021-05-07 PROCEDURE — 93005 ELECTROCARDIOGRAM TRACING: CPT | Performed by: ANESTHESIOLOGY

## 2021-05-07 PROCEDURE — 84520 ASSAY OF UREA NITROGEN: CPT

## 2021-05-07 PROCEDURE — 86850 RBC ANTIBODY SCREEN: CPT

## 2021-05-07 RX ORDER — ASPIRIN 81 MG/1
81 TABLET, CHEWABLE ORAL DAILY
COMMUNITY
End: 2022-03-16

## 2021-05-07 ASSESSMENT — PAIN DESCRIPTION - LOCATION: LOCATION: NECK;BACK

## 2021-05-07 ASSESSMENT — PAIN DESCRIPTION - FREQUENCY: FREQUENCY: CONTINUOUS

## 2021-05-07 ASSESSMENT — PAIN SCALES - GENERAL: PAINLEVEL_OUTOF10: 7

## 2021-05-07 ASSESSMENT — PAIN DESCRIPTION - PAIN TYPE: TYPE: CHRONIC PAIN

## 2021-05-07 ASSESSMENT — PAIN DESCRIPTION - DESCRIPTORS: DESCRIPTORS: CONSTANT

## 2021-05-07 ASSESSMENT — PAIN DESCRIPTION - PROGRESSION: CLINICAL_PROGRESSION: GRADUALLY WORSENING

## 2021-05-07 NOTE — PROGRESS NOTES
Anesthesia Focused Assessment    STOP-BANG Sleep Apnea Questionnaire    Prior + Covid-19 test? Yes  If yes, when: one year ago April 2020  Symptoms: mild fever and cough  Hospitalization required? no    SNORE loudly (heard through closed doors)? Yes  TIRED, fatigued, sleepy during daytime? No  OBSERVED stopping breathing during sleep? No  High blood PRESSURE or being treated? Yes    BMI over 35? No  AGE over 48? No  NECK circumference over 16\"? No  GENDER (male)? No             Total 2  High risk 5-8  Intermediate risk 3-4  Low risk 0-2    ----------------------------------------------------------------------------------------------------------------------  TINO:                                             no  If yes, machine?:                              Type 1 DM:                                   no  T2DM:                                           Pre-diabetes (on Januvia)    Coronary Artery Disease:             Prior MI 2005, had cath at that time, no stents- used to see cardiology Dr. Stephanie Lopez, now only follows with PCP who has cleared pt for upcoming surgery  Hypertension:                               yes  Defib / AICD / Pacemaker:           no    Renal Failure:                               no  If yes, on dialysis? :                       no    Active smoker:                              no  Drinks Alcohol:                              no  Illicit drugs:                                    no    Dentition:                                      Benign, crowns intact    Past Medical History:   Diagnosis Date    Allergic rhinitis, cause unspecified     Back pain     lumbar    Bowel obstruction (HCC)     history of due to scar tissue, resolved non-surgically    C. difficile diarrhea     CAD (coronary artery disease)     no stent needed per pt.  Dr. Stephanie Lopez did cath at [facility] 2005    Cardiac murmur     Cellulitis     left leg    Cellulitis 2017 August    leg left leg/bug bite    Cerebral artery occlusion with cerebral infarction [facility])     TIA 2014    COVID-19     ONE YR AGO IN 4/25/2020 fever and cough    Diverticulosis of colon (without mention of hemorrhage)     GERD (gastroesophageal reflux disease)     GERD (gastroesophageal reflux disease)     on rx    History of blood transfusion     approx 2020        History of CHF (congestive heart failure)     History of MI (myocardial infarction) 2005    thought due to a blood clot    History of ovarian cyst 1970    had oopherectomy holly    History of peritonitis 1968    due to ruptured appendix age 13    HTN (hypertension)     Hx of blood clots     right leg    Hyperlipidemia     Intestinal or peritoneal adhesions with obstruction (postoperative) ([facility]infection) ([facility]     Kidney infection     renal failure/sepsis/spider bite    Lateral epicondylitis  of elbow     MDRO (multiple drug resistant organisms) resistance     c diff    Muscle strain     right posterior shoulder    Other abnormal glucose     PONV (postoperative nausea and vomiting)     dry heaves    Pre-diabetes     Restless legs syndrome (RLS)     Snores     no cpap    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)     TIA (transient ischemic attack) 2014    Uses walker     Vitamin D deficiency     Wears glasses     Wellness examination     last seen 2 weeks ago         Patient was evaluated in PAT & anesthesia guidelines were applied. NPO guidelines, medication instructions and scheduled arrival time were reviewed with patient.      Hx of anesthesia complications:  PONV, scop patch effective   Family hx of anesthesia complications:  no                                                                                                                     Anesthesia contacted:  no    Medical or cardiac clearance ordered: pt saw pcp recently, office notes in [facility]  5/7/21  2:22 PM

## 2021-05-07 NOTE — H&P (VIEW-ONLY)
History and Physical    Pt Name: Didier Castillo  MRN: [id number]  YOB: 1950  Date of evaluation: 5/7/2021  Primary Care Physician: Priyanka Marshall MD    SUBJECTIVE:   History of Chief Complaint:    Didier Castillo is a 80 y.o. female who presents for PAT appointment. Patient complains of \"stiff neck, can't turn and can't hold up real good especially at the end of the day\". She reports this has been an issue since her back surgery one year ago (Dr. Linnea Rosales) and that symptoms have progressively worsened. Patient has been scheduled for  C2-6 LAMINECTOMY, FUSION. Allergies  is allergic to mobic [meloxicam]; bactrim [sulfamethoxazole-trimethoprim]; codeine; and seasonal.  Medications  Prior to Admission medications    Medication Sig Start Date End Date Taking?  Authorizing Provider   aspirin 81 MG chewable tablet Take 81 mg by mouth daily Indications: pt reports intstructed to hold x 10 days prior to surgery   Yes Historical Provider, MD   citalopram (CELEXA) 20 MG tablet Take 1 tablet by mouth daily  Patient taking differently: Take 20 mg by mouth daily Per pcp 4/22/21  Yes Priyanka Marshall MD   carvedilol (COREG) 12.5 MG tablet Take 1 tablet by mouth 2 times daily 4/15/21  Yes Priyanka Marshall MD   SITagliptin (JANUVIA) 100 MG tablet Take 0.5 tablets by mouth daily  Patient taking differently: Take 50 mg by mouth daily Per pcp 4/15/21  Yes Priyanka Marshall MD   amLODIPine (NORVASC) 5 MG tablet Take 1 tablet by mouth daily 4/15/21  Yes Priyanka Marshall MD   diphenhydrAMINE HCl (BENADRYL ALLERGY PO) Take 1 capsule by mouth daily Takes q HS   Yes Historical Provider, MD   methocarbamol (ROBAXIN-750) 750 MG tablet Take 3 times a day as need for spasm/pain 4/8/21  Yes Marge Carrera DO   lisinopril (PRINIVIL;ZESTRIL) 30 MG tablet Take 1 tablet by mouth daily 4/8/21  Yes Kelby Killian APRN - CNP   atorvastatin (LIPITOR) 80 MG tablet Take 0.5 tablets by mouth nightly 4/1/21  Yes Priyanka Marshall MD   fenofibrate micronized malaise    EYES:   negative for double vision, blurred vision and photophobia    HEENT:   negative for tinnitus, epistaxis and sore throat     RESPIRATORY:   negative for cough, shortness of breath, wheezing     CARDIOVASCULAR:   negative for chest pain, palpitations, syncope, edema     GASTROINTESTINAL:   negative for nausea, vomiting     GENITOURINARY:   negative for incontinence     MUSCULOSKELETAL:   Arthralgia    NEUROLOGICAL:   negative for headaches   PSYCHIATRIC:   negative for anxiety       OBJECTIVE:   VITALS:  height is 5' 3\" (1.6 m) and weight is 178 lb 1.9 oz (80.8 kg). Her temporal temperature is 96.1 °F (35.6 °C). Her blood pressure is 163/90 (abnormal) and her pulse is 62. Her respiration is 20 and oxygen saturation is 97%. CONSTITUTIONAL:alert & oriented x 3, no acute distress. Friendly. Very pleasant. SKIN:  Warm and dry, no rashes on exposed areas of skin. HEAD:  Normocephalic, atraumatic   EYES: PERRL. EOMs intact. EARS:  Equal bilaterally, no edema or thickening, skin is intact without lumps or lesions. No discharge. Hearing grossly WNL. NOSE:  Nares patent. No rhinorrhea   MOUTH/THROAT:  Mucous membranes moist, tongue is pink, uvula midline, teeth appear to be intact  NECK:supple, no lymphadenopathy, limited ROM  LUNGS: Respirations even and non-labored. Clear to auscultation bilaterally, no wheezes, rales, or rhonchi. CARDIOVASCULAR: Regular rate and rhythm, soft murmur. ABDOMEN: soft, non-tender, non-distended, bowel sounds active x 4   EXTREMITIES: trace edema bilateral lower extremities. No varicosities bilateral lower extremities. NEUROLOGIC: CN II-XII are grossly intact.  Gait is slow and shuffled with use of a walker    Testing:   EK21  Labs pending: drawn 2021   IMPRESSIONS:   cervical stenosis with myelopathy      Diagnosis Date    Allergic rhinitis, cause unspecified     Back pain     lumbar    Bowel obstruction (HCC)     history of due to scar tissue, resolved non-surgically    C. difficile diarrhea     CAD (coronary artery disease)     no stent needed per pt.  Dr. Adelina Mueller did cath at [facility]    Cardiac murmur     Cellulitis     left leg    Cellulitis 2017 August    leg left leg/bug bite    Cerebral artery occlusion with cerebral infarction [facility])     TIA 2014    COVID-19     ONE YR AGO IN 4/25/2020 fever and cough    Diverticulosis of colon (without mention of hemorrhage)     GERD (gastroesophageal reflux disease)     GERD (gastroesophageal reflux disease)     on rx    History of blood transfusion     approx 2020        History of CHF (congestive heart failure)     History of MI (myocardial infarction) 2005    thought due to a blood clot    History of ovarian cyst 1970    had oopherectomy holly    History of peritonitis 1968    due to ruptured appendix age 13    HTN (hypertension)     Hx of blood clots     right leg    Hyperlipidemia     Intestinal or peritoneal adhesions with obstruction (postoperative) (postinfection) ([facility] [address]     Kidney infection     renal failure/sepsis/spider bite    Lateral epicondylitis  of elbow     MDRO (multiple drug resistant organisms) resistance     c diff    Muscle strain     right posterior shoulder    Other abnormal glucose     PONV (postoperative nausea and vomiting)     dry heaves    Pre-diabetes     Restless legs syndrome (RLS)     Snores     no cpap    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)     TIA (transient ischemic attack) 2014    Uses walker     Vitamin D deficiency     Wears glasses     Wellness examination     last seen 2 weeks ago     PLANS:   C2-6 LAMINECTOMY, FUSION    ISHAAN  HUANG APRN- CNP  Electronically signed 5/7/2021 at 2:21 PM

## 2021-05-07 NOTE — H&P
History and Physical    Pt Name: Ren Bhatia  MRN: [id number]  YOB: 1950  Date of evaluation: 5/7/2021  Primary Care Physician: Sudheer Bender MD    SUBJECTIVE:   History of Chief Complaint:    Ren Bhatia is a 80 y.o. female who presents for PAT appointment. Patient complains of \"stiff neck, can't turn and can't hold up real good especially at the end of the day\". She reports this has been an issue since her back surgery one year ago (Dr. Roldan Gottlieb) and that symptoms have progressively worsened. Patient has been scheduled for  C2-6 LAMINECTOMY, FUSION. Allergies  is allergic to mobic [meloxicam]; bactrim [sulfamethoxazole-trimethoprim]; codeine; and seasonal.  Medications  Prior to Admission medications    Medication Sig Start Date End Date Taking?  Authorizing Provider   aspirin 81 MG chewable tablet Take 81 mg by mouth daily Indications: pt reports intstructed to hold x 10 days prior to surgery   Yes Historical Provider, MD   citalopram (CELEXA) 20 MG tablet Take 1 tablet by mouth daily  Patient taking differently: Take 20 mg by mouth daily Per pcp 4/22/21  Yes Sudheer Bender MD   carvedilol (COREG) 12.5 MG tablet Take 1 tablet by mouth 2 times daily 4/15/21  Yes Sudheer Bender MD   SITagliptin (JANUVIA) 100 MG tablet Take 0.5 tablets by mouth daily  Patient taking differently: Take 50 mg by mouth daily Per pcp 4/15/21  Yes Sudheer Bender MD   amLODIPine (NORVASC) 5 MG tablet Take 1 tablet by mouth daily 4/15/21  Yes Sudheer Bender MD   diphenhydrAMINE HCl (BENADRYL ALLERGY PO) Take 1 capsule by mouth daily Takes q HS   Yes Historical Provider, MD   methocarbamol (ROBAXIN-750) 750 MG tablet Take 3 times a day as need for spasm/pain 4/8/21  Yes Karri Harrison DO   lisinopril (PRINIVIL;ZESTRIL) 30 MG tablet Take 1 tablet by mouth daily 4/8/21  Yes MAIK Henderson CNP   atorvastatin (LIPITOR) 80 MG tablet Take 0.5 tablets by mouth nightly 4/1/21  Yes Sudheer Bender MD   fenofibrate micronized (LOFIBRA) 134 MG capsule Take 1 capsule by mouth every morning (before breakfast) 3/11/21  Yes Josie Kocher, MD   pantoprazole (PROTONIX) 40 MG tablet Take 1 tablet by mouth 2 times daily (before meals) 2/11/21  Yes Josie Kocher, MD   cyanocobalamin ([employer] VITAMIN B12) 1000 MCG tablet Take 1 tablet by mouth daily 12/3/20  Yes Josie Kocher, MD   furosemide (LASIX) 40 MG tablet Take 1 tablet by mouth 2 times daily 9/1/20  Yes Josie Kocher, MD   ferrous sulfate (IRON 325) 325 (65 Fe) MG tablet Take 1 tablet by mouth 2 times daily 7/2/20  Yes Boaz Bueno MD   VITAMIN D, ERGOCALCIFEROL, PO Take by mouth   Yes Historical Provider, MD   Calcium Carbonate-Vitamin D (CALCIUM 500/D) 500-125 MG-UNIT TABS Take 1 tablet by mouth 2 times daily    Yes Historical Provider, MD   Lancets MISC 1 each by Does not apply route daily 10/10/19   Tomas Gaytan MD   blood glucose monitor strips Test 2 times a day & as needed for symptoms of irregular blood glucose.  10/10/19   Tomas Gaytan MD   nitroGLYCERIN (NITROSTAT) 0.4 MG SL tablet Place 1 tablet under the tongue every 5 minutes as needed for Chest pain  Patient taking differently: Place 0.4 mg under the tongue every 5 minutes as needed for Chest pain Hasn't taken in about a year 1/8/18   Tomas Gaytan MD     Past Medical History    has a past medical history of Allergic rhinitis, cause unspecified, Back pain, Bowel obstruction (HCC), C. difficile diarrhea, CAD (coronary artery disease), Cardiac murmur, Cellulitis, Cellulitis, Cerebral artery occlusion with cerebral infarction ([facility] [address] COVID-19, Diverticulosis of colon (without mention of hemorrhage), GERD (gastroesophageal reflux disease), GERD (gastroesophageal reflux disease), History of blood transfusion, History of CHF (congestive heart failure), History of MI (myocardial infarction), History of ovarian cyst, History of peritonitis, HTN (hypertension), Hx of blood clots, Hyperlipidemia, Intestinal or peritoneal adhesions with obstruction (postoperative) (postinfection) ([address] Kidney infection, Lateral epicondylitis  of elbow, MDRO (multiple drug resistant organisms) resistance, Muscle strain, Other abnormal glucose, PONV (postoperative nausea and vomiting), Pre-diabetes, Restless legs syndrome (RLS), Snores, 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[address] TIA (transient ischemic attack), Uses walker, Vitamin D deficiency, Wears glasses, and Wellness examination. Past Surgical History   has a past surgical history that includes Ovary removal (); colectomy (); Appendectomy (); Ovary removal (); Hysterectomy (); Bunionectomy (Left); sinus surgery (); Colonoscopy; other surgical history (14); cyst removal (Right); Wrist fracture surgery (Left, 3/5/2019); Upper gastrointestinal endoscopy (N/A, 2019); Upper gastrointestinal endoscopy (N/A, 2019); Upper gastrointestinal endoscopy (N/A, 2019); Abdomen surgery (); lumbar fusion (N/A, 2/10/2020); Cardiac catheterization; Cardiac catheterization (); [place] tooth extraction; lumbar fusion (N/A, 2020); and back surgery. Social History   reports that she quit smoking about 3 years ago. Her smoking use included cigarettes. She started smoking about 25 years ago. She has a 10.00 pack-year smoking history. She has never used smokeless tobacco.    reports no history of alcohol use. reports no history of drug use. Marital Status    Children no biological, 2 adopted  Occupation hairdresser  Family History  Family Status   Relation Name Status    Mother     Steve Vidales Father     Steve Vidales Brother     [facility]     Steve Vidales Sister  (Not Specified)     family history includes Diabetes in her mother; Heart Disease in her brother, father, maternal grandmother, and mother; High Blood Pressure in her brother, mother, and sister; Stroke in her mother.     Review of Systems:  CONSTITUTIONAL:   negative for fevers, chills, fatigue and malaise    EYES:   negative for double vision, blurred vision and photophobia    HEENT:   negative for tinnitus, epistaxis and sore throat     RESPIRATORY:   negative for cough, shortness of breath, wheezing     CARDIOVASCULAR:   negative for chest pain, palpitations, syncope, edema     GASTROINTESTINAL:   negative for nausea, vomiting     GENITOURINARY:   negative for incontinence     MUSCULOSKELETAL:   Arthralgia    NEUROLOGICAL:   negative for headaches   PSYCHIATRIC:   negative for anxiety       OBJECTIVE:   VITALS:  height is 5' 3\" (1.6 m) and weight is 178 lb 1.9 oz (80.8 kg). Her temporal temperature is 96.1 °F (35.6 °C). Her blood pressure is 163/90 (abnormal) and her pulse is 62. Her respiration is 20 and oxygen saturation is 97%. CONSTITUTIONAL:alert & oriented x 3, no acute distress. Friendly. Very pleasant. SKIN:  Warm and dry, no rashes on exposed areas of skin. HEAD:  Normocephalic, atraumatic   EYES: PERRL. EOMs intact. EARS:  Equal bilaterally, no edema or thickening, skin is intact without lumps or lesions. No discharge. Hearing grossly WNL. NOSE:  Nares patent. No rhinorrhea   MOUTH/THROAT:  Mucous membranes moist, tongue is pink, uvula midline, teeth appear to be intact  NECK:supple, no lymphadenopathy, limited ROM  LUNGS: Respirations even and non-labored. Clear to auscultation bilaterally, no wheezes, rales, or rhonchi. CARDIOVASCULAR: Regular rate and rhythm, soft murmur. ABDOMEN: soft, non-tender, non-distended, bowel sounds active x 4   EXTREMITIES: trace edema bilateral lower extremities. No varicosities bilateral lower extremities. NEUROLOGIC: CN II-XII are grossly intact.  Gait is slow and shuffled with use of a walker    Testing:   EK21  Labs pending: drawn 2021   IMPRESSIONS:   cervical stenosis with myelopathy      Diagnosis Date    Allergic rhinitis, cause unspecified     Back pain     lumbar    Bowel obstruction (HCC)     history of due to scar tissue,

## 2021-05-09 LAB
EKG ATRIAL RATE: 59 BPM
EKG P AXIS: 59 DEGREES
EKG P-R INTERVAL: 156 MS
EKG Q-T INTERVAL: 436 MS
EKG QRS DURATION: 82 MS
EKG QTC CALCULATION (BAZETT): 431 MS
EKG R AXIS: 29 DEGREES
EKG T AXIS: 44 DEGREES
EKG VENTRICULAR RATE: 59 BPM

## 2021-05-09 PROCEDURE — 93010 ELECTROCARDIOGRAM REPORT: CPT | Performed by: INTERNAL MEDICINE

## 2021-05-17 ENCOUNTER — HOSPITAL ENCOUNTER (OUTPATIENT)
Dept: LAB | Age: 71
Setting detail: SPECIMEN
Discharge: HOME | End: 2021-05-17
Payer: MEDICARE

## 2021-05-17 DIAGNOSIS — Z20.822 COVID-19 RULED OUT BY LABORATORY TESTING: Primary | ICD-10-CM

## 2021-05-17 PROCEDURE — U0003 INFECTIOUS AGENT DETECTION BY NUCLEIC ACID (DNA OR RNA); SEVERE ACUTE RESPIRATORY SYNDROME CORONAVIRUS 2 (SARS-COV-2) (CORONAVIRUS DISEASE [COVID-19]), AMPLIFIED PROBE TECHNIQUE, MAKING USE OF HIGH THROUGHPUT TECHNOLOGIES AS DESCRIBED BY CMS-2020-01-R: HCPCS

## 2021-05-17 PROCEDURE — U0005 INFEC AGEN DETEC AMPLI PROBE: HCPCS

## 2021-05-17 RX ORDER — PANTOPRAZOLE SODIUM 40 MG/1
40 TABLET, DELAYED RELEASE ORAL
Qty: 180 TABLET | Refills: 3 | Status: SHIPPED | OUTPATIENT
Start: 2021-05-17 | End: 2021-12-03 | Stop reason: ALTCHOICE

## 2021-05-18 LAB
SARS-COV-2: NORMAL
SARS-COV-2: NOT DETECTED
SOURCE: NORMAL

## 2021-05-20 ENCOUNTER — ANESTHESIA EVENT (OUTPATIENT)
Dept: OPERATING ROOM | Age: 71
DRG: 472 | End: 2021-05-20
Payer: MEDICARE

## 2021-05-21 ENCOUNTER — APPOINTMENT (OUTPATIENT)
Dept: GENERAL RADIOLOGY | Age: 71
DRG: 472 | End: 2021-05-21
Attending: NEUROLOGICAL SURGERY
Payer: MEDICARE

## 2021-05-21 ENCOUNTER — HOSPITAL ENCOUNTER (INPATIENT)
Age: 71
LOS: 9 days | Discharge: NURSING, REHAB OR LONG-TERM CARE | DRG: 472 | End: 2021-05-30
Attending: NEUROLOGICAL SURGERY | Admitting: NEUROLOGICAL SURGERY
Payer: MEDICARE

## 2021-05-21 ENCOUNTER — ANESTHESIA (OUTPATIENT)
Dept: OPERATING ROOM | Age: 71
DRG: 472 | End: 2021-05-21
Payer: MEDICARE

## 2021-05-21 VITALS — OXYGEN SATURATION: 99 % | TEMPERATURE: 96.9 F | DIASTOLIC BLOOD PRESSURE: 44 MMHG | SYSTOLIC BLOOD PRESSURE: 61 MMHG

## 2021-05-21 DIAGNOSIS — G99.2 STENOSIS OF CERVICAL SPINE WITH MYELOPATHY (HCC): Primary | ICD-10-CM

## 2021-05-21 DIAGNOSIS — M48.02 STENOSIS OF CERVICAL SPINE WITH MYELOPATHY (HCC): Primary | ICD-10-CM

## 2021-05-21 LAB
GFR NON-AFRICAN AMERICAN: 41 ML/MIN
GFR SERPL CREATININE-BSD FRML MDRD: 50 ML/MIN
GFR SERPL CREATININE-BSD FRML MDRD: ABNORMAL ML/MIN/{1.73_M2}
GLUCOSE BLD-MCNC: 102 MG/DL (ref 74–100)
GLUCOSE BLD-MCNC: 128 MG/DL (ref 65–105)
GLUCOSE BLD-MCNC: 151 MG/DL (ref 65–105)
GLUCOSE BLD-MCNC: 178 MG/DL (ref 65–105)
POC CREATININE: 1.28 MG/DL (ref 0.51–1.19)
POC POTASSIUM: 3.7 MMOL/L (ref 3.5–4.5)

## 2021-05-21 PROCEDURE — 2580000003 HC RX 258

## 2021-05-21 PROCEDURE — 2709999900 HC NON-CHARGEABLE SUPPLY: Performed by: NEUROLOGICAL SURGERY

## 2021-05-21 PROCEDURE — 6360000002 HC RX W HCPCS: Performed by: ANESTHESIOLOGY

## 2021-05-21 PROCEDURE — 3600000001 HC SURGERY LEVEL 1  BASE: Performed by: NEUROLOGICAL SURGERY

## 2021-05-21 PROCEDURE — C1713 ANCHOR/SCREW BN/BN,TIS/BN: HCPCS | Performed by: NEUROLOGICAL SURGERY

## 2021-05-21 PROCEDURE — 22614 ARTHRD PST TQ 1NTRSPC EA ADD: CPT | Performed by: NEUROLOGICAL SURGERY

## 2021-05-21 PROCEDURE — 0RG2071 FUSION OF 2 OR MORE CERVICAL VERTEBRAL JOINTS WITH AUTOLOGOUS TISSUE SUBSTITUTE, POSTERIOR APPROACH, POSTERIOR COLUMN, OPEN APPROACH: ICD-10-PCS | Performed by: NEUROLOGICAL SURGERY

## 2021-05-21 PROCEDURE — 2720000010 HC SURG SUPPLY STERILE: Performed by: NEUROLOGICAL SURGERY

## 2021-05-21 PROCEDURE — 2580000003 HC RX 258: Performed by: NEUROLOGICAL SURGERY

## 2021-05-21 PROCEDURE — 94761 N-INVAS EAR/PLS OXIMETRY MLT: CPT

## 2021-05-21 PROCEDURE — 6370000000 HC RX 637 (ALT 250 FOR IP): Performed by: STUDENT IN AN ORGANIZED HEALTH CARE EDUCATION/TRAINING PROGRAM

## 2021-05-21 PROCEDURE — 00NW0ZZ RELEASE CERVICAL SPINAL CORD, OPEN APPROACH: ICD-10-PCS | Performed by: NEUROLOGICAL SURGERY

## 2021-05-21 PROCEDURE — C9290 INJ, BUPIVACAINE LIPOSOME: HCPCS | Performed by: NEUROLOGICAL SURGERY

## 2021-05-21 PROCEDURE — 6370000000 HC RX 637 (ALT 250 FOR IP): Performed by: NEUROLOGICAL SURGERY

## 2021-05-21 PROCEDURE — 99222 1ST HOSP IP/OBS MODERATE 55: CPT | Performed by: PHYSICAL MEDICINE & REHABILITATION

## 2021-05-21 PROCEDURE — 22600 ARTHRD PST TQ 1NTRSPC CRV: CPT | Performed by: NEUROLOGICAL SURGERY

## 2021-05-21 PROCEDURE — 6360000002 HC RX W HCPCS: Performed by: NEUROLOGICAL SURGERY

## 2021-05-21 PROCEDURE — 3600000011 HC SURGERY LEVEL 1  ADDTL 15MIN: Performed by: NEUROLOGICAL SURGERY

## 2021-05-21 PROCEDURE — 2500000003 HC RX 250 WO HCPCS

## 2021-05-21 PROCEDURE — 22842 INSERT SPINE FIXATION DEVICE: CPT | Performed by: NEUROLOGICAL SURGERY

## 2021-05-21 PROCEDURE — 2580000003 HC RX 258: Performed by: ANESTHESIOLOGY

## 2021-05-21 PROCEDURE — 84132 ASSAY OF SERUM POTASSIUM: CPT

## 2021-05-21 PROCEDURE — APPSS15 APP SPLIT SHARED TIME 0-15 MINUTES: Performed by: NURSE PRACTITIONER

## 2021-05-21 PROCEDURE — 1200000000 HC SEMI PRIVATE

## 2021-05-21 PROCEDURE — 7100000000 HC PACU RECOVERY - FIRST 15 MIN: Performed by: NEUROLOGICAL SURGERY

## 2021-05-21 PROCEDURE — 6370000000 HC RX 637 (ALT 250 FOR IP): Performed by: ANESTHESIOLOGY

## 2021-05-21 PROCEDURE — 82565 ASSAY OF CREATININE: CPT

## 2021-05-21 PROCEDURE — 6360000002 HC RX W HCPCS

## 2021-05-21 PROCEDURE — 6360000002 HC RX W HCPCS: Performed by: NURSE PRACTITIONER

## 2021-05-21 PROCEDURE — L0120 CERV FLEX N/ADJ FOAM PRE OTS: HCPCS | Performed by: NEUROLOGICAL SURGERY

## 2021-05-21 PROCEDURE — 87086 URINE CULTURE/COLONY COUNT: CPT

## 2021-05-21 PROCEDURE — 3209999900 FLUORO FOR SURGICAL PROCEDURES

## 2021-05-21 PROCEDURE — 3700000000 HC ANESTHESIA ATTENDED CARE: Performed by: NEUROLOGICAL SURGERY

## 2021-05-21 PROCEDURE — 2580000003 HC RX 258: Performed by: NURSE PRACTITIONER

## 2021-05-21 PROCEDURE — 3700000001 HC ADD 15 MINUTES (ANESTHESIA): Performed by: NEUROLOGICAL SURGERY

## 2021-05-21 PROCEDURE — 2500000003 HC RX 250 WO HCPCS: Performed by: NEUROLOGICAL SURGERY

## 2021-05-21 PROCEDURE — 82947 ASSAY GLUCOSE BLOOD QUANT: CPT

## 2021-05-21 PROCEDURE — 2780000010 HC IMPLANT OTHER: Performed by: NEUROLOGICAL SURGERY

## 2021-05-21 PROCEDURE — 6370000000 HC RX 637 (ALT 250 FOR IP): Performed by: NURSE PRACTITIONER

## 2021-05-21 PROCEDURE — 7100000001 HC PACU RECOVERY - ADDTL 15 MIN: Performed by: NEUROLOGICAL SURGERY

## 2021-05-21 PROCEDURE — 63015 REMOVE SPINE LAMINA >2 CRVCL: CPT | Performed by: NEUROLOGICAL SURGERY

## 2021-05-21 DEVICE — DEMINERALIZED CORT OSTEOCONDUCTIVE FRZ DRY 5CC FIBEROS: Type: IMPLANTABLE DEVICE | Site: SPINE CERVICAL | Status: FUNCTIONAL

## 2021-05-21 DEVICE — NUCELXL 2.5 CC OR ML BIOACTIVE AMNIO SUSP: Type: IMPLANTABLE DEVICE | Site: SPINE CERVICAL | Status: FUNCTIONAL

## 2021-05-21 DEVICE — GRAFT CELLR BNE MATRX INFLUX SPARC 5CC: Type: IMPLANTABLE DEVICE | Site: SPINE CERVICAL | Status: FUNCTIONAL

## 2021-05-21 DEVICE — SCREW, 3.8 MM X 12 MM
Type: IMPLANTABLE DEVICE | Site: SPINE CERVICAL | Status: FUNCTIONAL
Brand: PROFICIENT™ POSTERIOR CERVICAL SPINE SYSTEM

## 2021-05-21 RX ORDER — FENTANYL CITRATE 50 UG/ML
25 INJECTION, SOLUTION INTRAMUSCULAR; INTRAVENOUS EVERY 5 MIN PRN
Status: DISCONTINUED | OUTPATIENT
Start: 2021-05-21 | End: 2021-05-21 | Stop reason: HOSPADM

## 2021-05-21 RX ORDER — ALOGLIPTIN 12.5 MG/1
12.5 TABLET, FILM COATED ORAL DAILY
Status: DISCONTINUED | OUTPATIENT
Start: 2021-05-21 | End: 2021-05-31 | Stop reason: HOSPADM

## 2021-05-21 RX ORDER — CITALOPRAM 20 MG/1
20 TABLET ORAL DAILY
Status: DISCONTINUED | OUTPATIENT
Start: 2021-05-21 | End: 2021-05-31 | Stop reason: HOSPADM

## 2021-05-21 RX ORDER — LIDOCAINE HYDROCHLORIDE 10 MG/ML
INJECTION, SOLUTION EPIDURAL; INFILTRATION; INTRACAUDAL; PERINEURAL PRN
Status: DISCONTINUED | OUTPATIENT
Start: 2021-05-21 | End: 2021-05-21 | Stop reason: SDUPTHER

## 2021-05-21 RX ORDER — FENTANYL CITRATE 50 UG/ML
50 INJECTION, SOLUTION INTRAMUSCULAR; INTRAVENOUS EVERY 5 MIN PRN
Status: DISCONTINUED | OUTPATIENT
Start: 2021-05-21 | End: 2021-05-21 | Stop reason: HOSPADM

## 2021-05-21 RX ORDER — ROCURONIUM BROMIDE 10 MG/ML
INJECTION, SOLUTION INTRAVENOUS PRN
Status: DISCONTINUED | OUTPATIENT
Start: 2021-05-21 | End: 2021-05-21 | Stop reason: SDUPTHER

## 2021-05-21 RX ORDER — PANTOPRAZOLE SODIUM 40 MG/1
40 TABLET, DELAYED RELEASE ORAL
Status: DISCONTINUED | OUTPATIENT
Start: 2021-05-21 | End: 2021-05-31 | Stop reason: HOSPADM

## 2021-05-21 RX ORDER — SODIUM CHLORIDE, SODIUM LACTATE, POTASSIUM CHLORIDE, CALCIUM CHLORIDE 600; 310; 30; 20 MG/100ML; MG/100ML; MG/100ML; MG/100ML
1000 INJECTION, SOLUTION INTRAVENOUS CONTINUOUS
Status: DISCONTINUED | OUTPATIENT
Start: 2021-05-21 | End: 2021-05-21

## 2021-05-21 RX ORDER — ONDANSETRON 2 MG/ML
INJECTION INTRAMUSCULAR; INTRAVENOUS PRN
Status: DISCONTINUED | OUTPATIENT
Start: 2021-05-21 | End: 2021-05-21 | Stop reason: SDUPTHER

## 2021-05-21 RX ORDER — NEOSTIGMINE METHYLSULFATE 5 MG/5 ML
SYRINGE (ML) INTRAVENOUS PRN
Status: DISCONTINUED | OUTPATIENT
Start: 2021-05-21 | End: 2021-05-21 | Stop reason: SDUPTHER

## 2021-05-21 RX ORDER — 0.9 % SODIUM CHLORIDE 0.9 %
500 INTRAVENOUS SOLUTION INTRAVENOUS
Status: DISCONTINUED | OUTPATIENT
Start: 2021-05-21 | End: 2021-05-21 | Stop reason: HOSPADM

## 2021-05-21 RX ORDER — MORPHINE SULFATE 4 MG/ML
4 INJECTION, SOLUTION INTRAMUSCULAR; INTRAVENOUS
Status: DISCONTINUED | OUTPATIENT
Start: 2021-05-21 | End: 2021-05-24

## 2021-05-21 RX ORDER — SODIUM CHLORIDE 0.9 % (FLUSH) 0.9 %
10 SYRINGE (ML) INJECTION EVERY 12 HOURS SCHEDULED
Status: DISCONTINUED | OUTPATIENT
Start: 2021-05-21 | End: 2021-05-31 | Stop reason: HOSPADM

## 2021-05-21 RX ORDER — DIPHENHYDRAMINE HYDROCHLORIDE 50 MG/ML
12.5 INJECTION INTRAMUSCULAR; INTRAVENOUS
Status: DISCONTINUED | OUTPATIENT
Start: 2021-05-21 | End: 2021-05-21 | Stop reason: HOSPADM

## 2021-05-21 RX ORDER — GLYCOPYRROLATE 1 MG/5 ML
SYRINGE (ML) INTRAVENOUS PRN
Status: DISCONTINUED | OUTPATIENT
Start: 2021-05-21 | End: 2021-05-21 | Stop reason: SDUPTHER

## 2021-05-21 RX ORDER — ONDANSETRON 2 MG/ML
4 INJECTION INTRAMUSCULAR; INTRAVENOUS DAILY PRN
Status: DISCONTINUED | OUTPATIENT
Start: 2021-05-21 | End: 2021-05-21 | Stop reason: HOSPADM

## 2021-05-21 RX ORDER — LABETALOL HYDROCHLORIDE 5 MG/ML
5 INJECTION, SOLUTION INTRAVENOUS EVERY 10 MIN PRN
Status: DISCONTINUED | OUTPATIENT
Start: 2021-05-21 | End: 2021-05-21 | Stop reason: HOSPADM

## 2021-05-21 RX ORDER — ACETAMINOPHEN 325 MG/1
650 TABLET ORAL EVERY 6 HOURS
Status: DISCONTINUED | OUTPATIENT
Start: 2021-05-21 | End: 2021-05-31 | Stop reason: HOSPADM

## 2021-05-21 RX ORDER — OXYCODONE HYDROCHLORIDE 5 MG/1
10 TABLET ORAL EVERY 4 HOURS PRN
Status: DISCONTINUED | OUTPATIENT
Start: 2021-05-21 | End: 2021-05-31 | Stop reason: HOSPADM

## 2021-05-21 RX ORDER — LANOLIN ALCOHOL/MO/W.PET/CERES
6 CREAM (GRAM) TOPICAL ONCE
Status: COMPLETED | OUTPATIENT
Start: 2021-05-22 | End: 2021-05-22

## 2021-05-21 RX ORDER — SODIUM CHLORIDE, SODIUM LACTATE, POTASSIUM CHLORIDE, CALCIUM CHLORIDE 600; 310; 30; 20 MG/100ML; MG/100ML; MG/100ML; MG/100ML
INJECTION, SOLUTION INTRAVENOUS CONTINUOUS PRN
Status: DISCONTINUED | OUTPATIENT
Start: 2021-05-21 | End: 2021-05-21 | Stop reason: SDUPTHER

## 2021-05-21 RX ORDER — MIDAZOLAM HYDROCHLORIDE 2 MG/2ML
1 INJECTION, SOLUTION INTRAMUSCULAR; INTRAVENOUS EVERY 10 MIN PRN
Status: DISCONTINUED | OUTPATIENT
Start: 2021-05-21 | End: 2021-05-21 | Stop reason: HOSPADM

## 2021-05-21 RX ORDER — SODIUM CHLORIDE 9 MG/ML
25 INJECTION, SOLUTION INTRAVENOUS PRN
Status: DISCONTINUED | OUTPATIENT
Start: 2021-05-21 | End: 2021-05-21 | Stop reason: HOSPADM

## 2021-05-21 RX ORDER — SCOLOPAMINE TRANSDERMAL SYSTEM 1 MG/1
1 PATCH, EXTENDED RELEASE TRANSDERMAL ONCE
Status: DISCONTINUED | OUTPATIENT
Start: 2021-05-21 | End: 2021-05-24

## 2021-05-21 RX ORDER — HYDROXYZINE HYDROCHLORIDE 10 MG/1
10 TABLET, FILM COATED ORAL
Status: COMPLETED | OUTPATIENT
Start: 2021-05-21 | End: 2021-05-21

## 2021-05-21 RX ORDER — DEXAMETHASONE SODIUM PHOSPHATE 10 MG/ML
INJECTION INTRAMUSCULAR; INTRAVENOUS PRN
Status: DISCONTINUED | OUTPATIENT
Start: 2021-05-21 | End: 2021-05-21 | Stop reason: SDUPTHER

## 2021-05-21 RX ORDER — HYDRALAZINE HYDROCHLORIDE 20 MG/ML
5 INJECTION INTRAMUSCULAR; INTRAVENOUS EVERY 10 MIN PRN
Status: DISCONTINUED | OUTPATIENT
Start: 2021-05-21 | End: 2021-05-21 | Stop reason: HOSPADM

## 2021-05-21 RX ORDER — MAGNESIUM HYDROXIDE 1200 MG/15ML
LIQUID ORAL CONTINUOUS PRN
Status: COMPLETED | OUTPATIENT
Start: 2021-05-21 | End: 2021-05-21

## 2021-05-21 RX ORDER — METHOCARBAMOL 750 MG/1
750 TABLET, FILM COATED ORAL 4 TIMES DAILY
Status: DISCONTINUED | OUTPATIENT
Start: 2021-05-21 | End: 2021-05-30

## 2021-05-21 RX ORDER — MORPHINE SULFATE 2 MG/ML
2 INJECTION, SOLUTION INTRAMUSCULAR; INTRAVENOUS
Status: DISCONTINUED | OUTPATIENT
Start: 2021-05-21 | End: 2021-05-24

## 2021-05-21 RX ORDER — ONDANSETRON 2 MG/ML
4 INJECTION INTRAMUSCULAR; INTRAVENOUS
Status: DISCONTINUED | OUTPATIENT
Start: 2021-05-21 | End: 2021-05-21 | Stop reason: HOSPADM

## 2021-05-21 RX ORDER — PROPOFOL 10 MG/ML
INJECTION, EMULSION INTRAVENOUS PRN
Status: DISCONTINUED | OUTPATIENT
Start: 2021-05-21 | End: 2021-05-21 | Stop reason: SDUPTHER

## 2021-05-21 RX ORDER — OXYCODONE HYDROCHLORIDE 5 MG/1
5 TABLET ORAL EVERY 4 HOURS PRN
Status: DISCONTINUED | OUTPATIENT
Start: 2021-05-21 | End: 2021-05-31 | Stop reason: HOSPADM

## 2021-05-21 RX ORDER — LANOLIN ALCOHOL/MO/W.PET/CERES
325 CREAM (GRAM) TOPICAL 2 TIMES DAILY
Status: DISCONTINUED | OUTPATIENT
Start: 2021-05-21 | End: 2021-05-31 | Stop reason: HOSPADM

## 2021-05-21 RX ORDER — SODIUM CHLORIDE 0.9 % (FLUSH) 0.9 %
5-40 SYRINGE (ML) INJECTION EVERY 12 HOURS SCHEDULED
Status: DISCONTINUED | OUTPATIENT
Start: 2021-05-21 | End: 2021-05-21 | Stop reason: HOSPADM

## 2021-05-21 RX ORDER — LIDOCAINE HYDROCHLORIDE AND EPINEPHRINE 10; 10 MG/ML; UG/ML
INJECTION, SOLUTION INFILTRATION; PERINEURAL PRN
Status: DISCONTINUED | OUTPATIENT
Start: 2021-05-21 | End: 2021-05-21 | Stop reason: ALTCHOICE

## 2021-05-21 RX ORDER — FENOFIBRATE 54 MG/1
134 TABLET ORAL
Status: DISCONTINUED | OUTPATIENT
Start: 2021-05-22 | End: 2021-05-31 | Stop reason: HOSPADM

## 2021-05-21 RX ORDER — SODIUM CHLORIDE 0.9 % (FLUSH) 0.9 %
5-40 SYRINGE (ML) INJECTION PRN
Status: DISCONTINUED | OUTPATIENT
Start: 2021-05-21 | End: 2021-05-21 | Stop reason: HOSPADM

## 2021-05-21 RX ORDER — CARVEDILOL 12.5 MG/1
12.5 TABLET ORAL 2 TIMES DAILY
Status: DISCONTINUED | OUTPATIENT
Start: 2021-05-21 | End: 2021-05-31 | Stop reason: HOSPADM

## 2021-05-21 RX ORDER — POLYETHYLENE GLYCOL 3350 17 G/17G
17 POWDER, FOR SOLUTION ORAL DAILY
Status: DISCONTINUED | OUTPATIENT
Start: 2021-05-21 | End: 2021-05-31 | Stop reason: HOSPADM

## 2021-05-21 RX ORDER — PROMETHAZINE HYDROCHLORIDE 25 MG/ML
6.25 INJECTION, SOLUTION INTRAMUSCULAR; INTRAVENOUS
Status: DISCONTINUED | OUTPATIENT
Start: 2021-05-21 | End: 2021-05-21 | Stop reason: HOSPADM

## 2021-05-21 RX ORDER — CHOLECALCIFEROL (VITAMIN D3) 125 MCG
1000 CAPSULE ORAL DAILY
Status: DISCONTINUED | OUTPATIENT
Start: 2021-05-21 | End: 2021-05-31 | Stop reason: HOSPADM

## 2021-05-21 RX ORDER — FUROSEMIDE 40 MG/1
40 TABLET ORAL 2 TIMES DAILY
Status: DISCONTINUED | OUTPATIENT
Start: 2021-05-21 | End: 2021-05-31 | Stop reason: HOSPADM

## 2021-05-21 RX ORDER — LIDOCAINE HYDROCHLORIDE 10 MG/ML
1 INJECTION, SOLUTION EPIDURAL; INFILTRATION; INTRACAUDAL; PERINEURAL
Status: DISCONTINUED | OUTPATIENT
Start: 2021-05-21 | End: 2021-05-21 | Stop reason: HOSPADM

## 2021-05-21 RX ORDER — ONDANSETRON 2 MG/ML
4 INJECTION INTRAMUSCULAR; INTRAVENOUS EVERY 6 HOURS PRN
Status: DISCONTINUED | OUTPATIENT
Start: 2021-05-21 | End: 2021-05-31 | Stop reason: HOSPADM

## 2021-05-21 RX ORDER — AMLODIPINE BESYLATE 5 MG/1
5 TABLET ORAL DAILY
Status: DISCONTINUED | OUTPATIENT
Start: 2021-05-21 | End: 2021-05-31 | Stop reason: HOSPADM

## 2021-05-21 RX ORDER — SODIUM CHLORIDE, SODIUM LACTATE, POTASSIUM CHLORIDE, CALCIUM CHLORIDE 600; 310; 30; 20 MG/100ML; MG/100ML; MG/100ML; MG/100ML
INJECTION, SOLUTION INTRAVENOUS CONTINUOUS
Status: DISCONTINUED | OUTPATIENT
Start: 2021-05-21 | End: 2021-05-21

## 2021-05-21 RX ORDER — ATORVASTATIN CALCIUM 40 MG/1
40 TABLET, FILM COATED ORAL NIGHTLY
Status: DISCONTINUED | OUTPATIENT
Start: 2021-05-21 | End: 2021-05-31 | Stop reason: HOSPADM

## 2021-05-21 RX ORDER — VANCOMYCIN HYDROCHLORIDE 1 G/20ML
INJECTION, POWDER, LYOPHILIZED, FOR SOLUTION INTRAVENOUS PRN
Status: DISCONTINUED | OUTPATIENT
Start: 2021-05-21 | End: 2021-05-21 | Stop reason: ALTCHOICE

## 2021-05-21 RX ORDER — GINSENG 100 MG
CAPSULE ORAL PRN
Status: DISCONTINUED | OUTPATIENT
Start: 2021-05-21 | End: 2021-05-21 | Stop reason: ALTCHOICE

## 2021-05-21 RX ORDER — SENNOSIDES 8.8 MG/5ML
5 LIQUID ORAL 2 TIMES DAILY PRN
Status: DISCONTINUED | OUTPATIENT
Start: 2021-05-21 | End: 2021-05-31 | Stop reason: HOSPADM

## 2021-05-21 RX ORDER — FENTANYL CITRATE 50 UG/ML
INJECTION, SOLUTION INTRAMUSCULAR; INTRAVENOUS PRN
Status: DISCONTINUED | OUTPATIENT
Start: 2021-05-21 | End: 2021-05-21 | Stop reason: SDUPTHER

## 2021-05-21 RX ORDER — SODIUM CHLORIDE 9 MG/ML
INJECTION, SOLUTION INTRAVENOUS CONTINUOUS
Status: DISCONTINUED | OUTPATIENT
Start: 2021-05-21 | End: 2021-05-31 | Stop reason: HOSPADM

## 2021-05-21 RX ADMIN — MORPHINE SULFATE 2 MG: 2 INJECTION, SOLUTION INTRAMUSCULAR; INTRAVENOUS at 16:00

## 2021-05-21 RX ADMIN — Medication 1 TABLET: at 21:20

## 2021-05-21 RX ADMIN — Medication 0.6 MG: at 10:54

## 2021-05-21 RX ADMIN — FENTANYL CITRATE 25 MCG: 50 INJECTION, SOLUTION INTRAMUSCULAR; INTRAVENOUS at 11:14

## 2021-05-21 RX ADMIN — FENTANYL CITRATE 50 MCG: 50 INJECTION, SOLUTION INTRAMUSCULAR; INTRAVENOUS at 07:40

## 2021-05-21 RX ADMIN — PROPOFOL 60 MCG/KG/MIN: 10 INJECTION, EMULSION INTRAVENOUS at 07:58

## 2021-05-21 RX ADMIN — FUROSEMIDE 40 MG: 40 TABLET ORAL at 17:02

## 2021-05-21 RX ADMIN — PROPOFOL 50 MG: 10 INJECTION, EMULSION INTRAVENOUS at 10:54

## 2021-05-21 RX ADMIN — CEFAZOLIN 2000 MG: 10 INJECTION, POWDER, FOR SOLUTION INTRAVENOUS at 17:02

## 2021-05-21 RX ADMIN — ROCURONIUM BROMIDE 50 MG: 10 INJECTION INTRAVENOUS at 07:29

## 2021-05-21 RX ADMIN — FENTANYL CITRATE 50 MCG: 50 INJECTION, SOLUTION INTRAMUSCULAR; INTRAVENOUS at 11:29

## 2021-05-21 RX ADMIN — METHOCARBAMOL TABLETS 750 MG: 750 TABLET, COATED ORAL at 12:54

## 2021-05-21 RX ADMIN — PROPOFOL 30 MG: 10 INJECTION, EMULSION INTRAVENOUS at 09:08

## 2021-05-21 RX ADMIN — LIDOCAINE HYDROCHLORIDE 50 MG: 10 INJECTION, SOLUTION EPIDURAL; INFILTRATION; INTRACAUDAL; PERINEURAL at 07:29

## 2021-05-21 RX ADMIN — FERROUS SULFATE TAB EC 325 MG (65 MG FE EQUIVALENT) 325 MG: 325 (65 FE) TABLET DELAYED RESPONSE at 21:20

## 2021-05-21 RX ADMIN — Medication 3 MG: at 10:54

## 2021-05-21 RX ADMIN — SODIUM CHLORIDE: 9 INJECTION, SOLUTION INTRAVENOUS at 12:57

## 2021-05-21 RX ADMIN — PHENYLEPHRINE HYDROCHLORIDE 50 MCG/MIN: 10 INJECTION INTRAVENOUS at 08:56

## 2021-05-21 RX ADMIN — CARVEDILOL 12.5 MG: 12.5 TABLET, FILM COATED ORAL at 21:20

## 2021-05-21 RX ADMIN — HYDROXYZINE HYDROCHLORIDE 10 MG: 10 TABLET ORAL at 22:36

## 2021-05-21 RX ADMIN — PANTOPRAZOLE SODIUM 40 MG: 40 TABLET, DELAYED RELEASE ORAL at 17:02

## 2021-05-21 RX ADMIN — PROPOFOL 200 MG: 10 INJECTION, EMULSION INTRAVENOUS at 07:29

## 2021-05-21 RX ADMIN — DEXAMETHASONE SODIUM PHOSPHATE 10 MG: 10 INJECTION INTRAMUSCULAR; INTRAVENOUS at 08:12

## 2021-05-21 RX ADMIN — ACETAMINOPHEN 650 MG: 325 TABLET ORAL at 12:54

## 2021-05-21 RX ADMIN — SODIUM CHLORIDE, POTASSIUM CHLORIDE, SODIUM LACTATE AND CALCIUM CHLORIDE: 600; 310; 30; 20 INJECTION, SOLUTION INTRAVENOUS at 06:30

## 2021-05-21 RX ADMIN — ACETAMINOPHEN 650 MG: 325 TABLET ORAL at 18:49

## 2021-05-21 RX ADMIN — DESMOPRESSIN ACETATE 40 MG: 0.2 TABLET ORAL at 21:20

## 2021-05-21 RX ADMIN — FENTANYL CITRATE 50 MCG: 50 INJECTION, SOLUTION INTRAMUSCULAR; INTRAVENOUS at 11:35

## 2021-05-21 RX ADMIN — OXYCODONE HYDROCHLORIDE 5 MG: 5 TABLET ORAL at 12:54

## 2021-05-21 RX ADMIN — PROPOFOL 50 MG: 10 INJECTION, EMULSION INTRAVENOUS at 07:39

## 2021-05-21 RX ADMIN — CEFAZOLIN 2000 MG: 10 INJECTION, POWDER, FOR SOLUTION INTRAVENOUS at 07:44

## 2021-05-21 RX ADMIN — METHOCARBAMOL TABLETS 750 MG: 750 TABLET, COATED ORAL at 17:02

## 2021-05-21 RX ADMIN — OXYCODONE HYDROCHLORIDE 10 MG: 5 TABLET ORAL at 21:20

## 2021-05-21 RX ADMIN — OXYCODONE HYDROCHLORIDE 10 MG: 5 TABLET ORAL at 17:02

## 2021-05-21 RX ADMIN — Medication 16 MCG: at 08:51

## 2021-05-21 RX ADMIN — FENTANYL CITRATE 50 MCG: 50 INJECTION, SOLUTION INTRAMUSCULAR; INTRAVENOUS at 07:29

## 2021-05-21 RX ADMIN — ROCURONIUM BROMIDE 20 MG: 10 INJECTION INTRAVENOUS at 08:39

## 2021-05-21 RX ADMIN — FENTANYL CITRATE 25 MCG: 50 INJECTION, SOLUTION INTRAMUSCULAR; INTRAVENOUS at 11:12

## 2021-05-21 RX ADMIN — METHOCARBAMOL TABLETS 750 MG: 750 TABLET, COATED ORAL at 21:20

## 2021-05-21 RX ADMIN — SODIUM CHLORIDE, POTASSIUM CHLORIDE, SODIUM LACTATE AND CALCIUM CHLORIDE: 600; 310; 30; 20 INJECTION, SOLUTION INTRAVENOUS at 07:39

## 2021-05-21 RX ADMIN — ONDANSETRON 4 MG: 2 INJECTION INTRAMUSCULAR; INTRAVENOUS at 10:28

## 2021-05-21 RX ADMIN — PROPOFOL 100 MG: 10 INJECTION, EMULSION INTRAVENOUS at 07:44

## 2021-05-21 ASSESSMENT — PULMONARY FUNCTION TESTS
PIF_VALUE: 19
PIF_VALUE: 19
PIF_VALUE: 20
PIF_VALUE: 20
PIF_VALUE: 19
PIF_VALUE: 19
PIF_VALUE: 20
PIF_VALUE: 19
PIF_VALUE: 20
PIF_VALUE: 20
PIF_VALUE: 18
PIF_VALUE: 20
PIF_VALUE: 19
PIF_VALUE: 20
PIF_VALUE: 19
PIF_VALUE: 2
PIF_VALUE: 18
PIF_VALUE: 20
PIF_VALUE: 19
PIF_VALUE: 20
PIF_VALUE: 19
PIF_VALUE: 20
PIF_VALUE: 19
PIF_VALUE: 20
PIF_VALUE: 19
PIF_VALUE: 20
PIF_VALUE: 21
PIF_VALUE: 20
PIF_VALUE: 19
PIF_VALUE: 19
PIF_VALUE: 20
PIF_VALUE: 21
PIF_VALUE: 20
PIF_VALUE: 19
PIF_VALUE: 20
PIF_VALUE: 19
PIF_VALUE: 19
PIF_VALUE: 17
PIF_VALUE: 19
PIF_VALUE: 19
PIF_VALUE: 20
PIF_VALUE: 20
PIF_VALUE: 19
PIF_VALUE: 19
PIF_VALUE: 18
PIF_VALUE: 3
PIF_VALUE: 20
PIF_VALUE: 5
PIF_VALUE: 19
PIF_VALUE: 19
PIF_VALUE: 20
PIF_VALUE: 19
PIF_VALUE: 1
PIF_VALUE: 20
PIF_VALUE: 1
PIF_VALUE: 20
PIF_VALUE: 16
PIF_VALUE: 20
PIF_VALUE: 19
PIF_VALUE: 20
PIF_VALUE: 17
PIF_VALUE: 19
PIF_VALUE: 20
PIF_VALUE: 20
PIF_VALUE: 19
PIF_VALUE: 19
PIF_VALUE: 20
PIF_VALUE: 19
PIF_VALUE: 16
PIF_VALUE: 20
PIF_VALUE: 20
PIF_VALUE: 19
PIF_VALUE: 18
PIF_VALUE: 20
PIF_VALUE: 22
PIF_VALUE: 20
PIF_VALUE: 19
PIF_VALUE: 20
PIF_VALUE: 17
PIF_VALUE: 20
PIF_VALUE: 19
PIF_VALUE: 0
PIF_VALUE: 20
PIF_VALUE: 19
PIF_VALUE: 3
PIF_VALUE: 19
PIF_VALUE: 20
PIF_VALUE: 19
PIF_VALUE: 5
PIF_VALUE: 17
PIF_VALUE: 20
PIF_VALUE: 20
PIF_VALUE: 23
PIF_VALUE: 20
PIF_VALUE: 19
PIF_VALUE: 20
PIF_VALUE: 19
PIF_VALUE: 20
PIF_VALUE: 20
PIF_VALUE: 19
PIF_VALUE: 17

## 2021-05-21 ASSESSMENT — PAIN SCALES - GENERAL
PAINLEVEL_OUTOF10: 8
PAINLEVEL_OUTOF10: 4
PAINLEVEL_OUTOF10: 2
PAINLEVEL_OUTOF10: 8
PAINLEVEL_OUTOF10: 6
PAINLEVEL_OUTOF10: 10
PAINLEVEL_OUTOF10: 5
PAINLEVEL_OUTOF10: 3
PAINLEVEL_OUTOF10: 3
PAINLEVEL_OUTOF10: 8
PAINLEVEL_OUTOF10: 8
PAINLEVEL_OUTOF10: 2

## 2021-05-21 ASSESSMENT — PAIN DESCRIPTION - LOCATION
LOCATION: NECK
LOCATION: NECK

## 2021-05-21 ASSESSMENT — ENCOUNTER SYMPTOMS
STRIDOR: 0
SHORTNESS OF BREATH: 1

## 2021-05-21 ASSESSMENT — PAIN DESCRIPTION - PAIN TYPE
TYPE: SURGICAL PAIN
TYPE: SURGICAL PAIN

## 2021-05-21 ASSESSMENT — PAIN - FUNCTIONAL ASSESSMENT: PAIN_FUNCTIONAL_ASSESSMENT: 0-10

## 2021-05-21 ASSESSMENT — PAIN DESCRIPTION - FREQUENCY: FREQUENCY: CONTINUOUS

## 2021-05-21 ASSESSMENT — PAIN DESCRIPTION - PROGRESSION: CLINICAL_PROGRESSION: NOT CHANGED

## 2021-05-21 ASSESSMENT — PAIN DESCRIPTION - DESCRIPTORS: DESCRIPTORS: ACHING;DISCOMFORT

## 2021-05-21 ASSESSMENT — PAIN DESCRIPTION - ORIENTATION: ORIENTATION: MID

## 2021-05-21 ASSESSMENT — PAIN DESCRIPTION - ONSET: ONSET: ON-GOING

## 2021-05-21 NOTE — CARE COORDINATION
Case Management Initial Discharge Plan  Joss Valenzuela,             Met with:patient to discuss discharge plans. Information verified: address, contacts, phone number, , insurance Yes    Emergency Contact/Next of Kin name & number:   Garrick Tony (spouse) [phone]  Basilio Crow (Daughter) [phone]    PCP: Jania Webb MD  Date of last visit: 2 weeks ago    Insurance Provider: [address] Medicare    Discharge Planning    Living Arrangements:  Spouse/Significant Other   Support Systems:  Spouse/Significant Other, Family Members    Home has ground level apartment   1 stairs to climb to get into front door,   Location of bedroom/bathroom in home main    Patient able to perform ADL's:Independent    Current Services (outpatient & in home) none  DME equipment: walker,shper chair,glucometer  DME provider:     Receiving oral anticoagulation therapy? No    If indicated:   Physician managing anticoagulation treatment: n/a  Where does patient obtain lab work for ATC treatment? n/a      Potential Assistance Needed:  Tigre Ge    Patient agreeable to home care: No  [place] of choice provided:  n/a    Prior SNF/Rehab Placement and Facility: [address] [place] [address] to SNF/Rehab: Yes  [place] of choice provided: yes     Evaluation: n/a    Expected Discharge date:  21    Patient expects to be discharged to:  [facility] vs SNF  Follow Up Appointment: Best Day/ Time: Monday AM    Transportation provider: will need  Transportation arrangements needed for discharge: Yes    Readmission Risk              Risk of Unplanned Readmission:  13             Does patient have a readmission risk score greater than 14?: No  If yes, follow-up appointment must be made within 7 days of discharge. Goals of Care:       Discharge Plan: Patient agreeable to rehab, her choices are [facility] inpatient [facility], [facility] for SNF.  PM&R consulted. E-referrals sent to [address] [place] [address].           Electronically signed by Sebas Ugalde RN on 5/21/21 at 5:06 PM EDT

## 2021-05-21 NOTE — CONSULTS
obstruction ([address]     history of due to scar tissue, resolved non-surgically    C. difficile diarrhea     CAD (coronary artery disease)     no stent needed per pt.  Dr. Sofi Black did cath at  2005    Cardiac murmur     Cellulitis     left leg    Cellulitis 2017 August    leg left leg/bug bite    Cerebral artery occlusion with cerebral infarction [facility])     TIA 2014    COVID-19     ONE YR AGO IN 4/25/2020 fever and cough    Diverticulosis of colon (without mention of hemorrhage)     GERD (gastroesophageal reflux disease)     GERD (gastroesophageal reflux disease)     on rx    History of blood transfusion     approx 2020        History of CHF (congestive heart failure)     History of MI (myocardial infarction) 2005    thought due to a blood clot    History of ovarian cyst 1970    had oopherectomy holly    History of peritonitis 1968    due to ruptured appendix age 13    HTN (hypertension)     Hx of blood clots     right leg    Hyperlipidemia     Intestinal or peritoneal adhesions with obstruction (postoperative) (postinfection) ([address]     Kidney infection     renal failure/sepsis/spider bite    Lateral epicondylitis  of elbow     MDRO (multiple drug resistant organisms) resistance     c diff    Muscle strain     right posterior shoulder    Other abnormal glucose     PONV (postoperative nausea and vomiting)     dry heaves    Pre-diabetes     Restless legs syndrome (RLS)     Snores     no cpap    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)     TIA (transient ischemic attack) 2014    Uses walker     Vitamin D deficiency     Wears glasses     Wellness examination     last seen 2 weeks ago       Past Surgical History:        Procedure Laterality Date    ABDOMEN SURGERY  1976    benign tumor removed near remaining ovary, 1.5 pounds    APPENDECTOMY  1968    appendix ruptured, developed peritonitis    BACK SURGERY      BUNIONECTOMY Left     along with calcium deposits removed    CARDIAC CATHETERIZATION      CARDIAC CATHETERIZATION  2005    negative    CERVICAL FUSION  05/21/2021    POSTERIOR C3-6 LAMINECTOMY, PARTIAL C7 LAMINECTOMY, FUSION C3-C6, SILVERCORD    COLECTOMY  1969    12 INCHES REMOVED D/T OBSTRUCTION    COLONOSCOPY      CYST REMOVAL Right     right facial    HYSTERECTOMY  1973    taken as a result of recurring cysts    LUMBAR FUSION N/A 02/10/2020    LUMBAR L4-5 POSTERIOR  DECOMPRESSION INSTRUMENTATION FUSION WCEMENT AUGMENTATION/ performed by Jesus Alberto Grande MD at [facility] N/A 06/17/2020    L5-S1 PLIF L4-L5 REVISION performed by Jesus Alberto Grande MD at [facility]  08/14/2014    FESS    OVARY REMOVAL  1970    UNILATERAL due to cyst    OVARY REMOVAL  1971    partial, due to cyst   Ronn Galla SINUS SURGERY  2004    UPPER GASTROINTESTINAL ENDOSCOPY N/A 05/31/2019    EGD ESOPHAGOGASTRODUODENOSCOPY performed by Joaquín Berrios MD at [facility] [place] N/A 08/05/2019    EGD BIOPSY performed by Namita Epstein MD at [address] [place] [address] N/A 08/23/2019    EGD BIOPSY performed by Joaquín Berrios MD at [address] [place] [address] 03/05/2019    WRIST OPEN REDUCTION INTERNAL FIXATION performed by Mary Telles MD at [facility]:     Allergies   Allergen Reactions    Mobic [Meloxicam]     Bactrim [Sulfamethoxazole-Trimethoprim] Other (See Comments)     sepsis    Codeine Itching    Seasonal         Current Medications:   Current Facility-Administered Medications: amLODIPine (NORVASC) tablet 5 mg, 5 mg, Oral, Daily  atorvastatin (LIPITOR) tablet 40 mg, 40 mg, Oral, Nightly  calcium carbonate-vitamin D3 (CALTRATE) 600-400 MG-UNIT per tab 1 tablet, 1 tablet, Oral, BID  carvedilol (COREG) tablet 12.5 mg, 12.5 mg, Oral, BID  citalopram (CELEXA) tablet 20 mg, 20 mg, Oral, Daily  vitamin B-12 (CYANOCOBALAMIN) tablet 1,000 mcg, 1,000 mcg, Oral, Smokeless tobacco: Never Used   Vaping Use    Vaping Use: Never used   Substance and Sexual Activity    Alcohol use: No     Alcohol/week: 0.0 standard drinks    Drug use: No    Sexual activity: Not on file   Other Topics Concern    Not on file   Social History Narrative    Not on file     Social Determinants of Health     Financial Resource Strain:     Difficulty of Paying Living Expenses:    Food Insecurity:     Worried About Running Out of Food in the Last Year:     Ran Out of Food in the Last Year:    Transportation Needs:     Lack of Transportation (Medical):      Lack of Transportation (Non-Medical):    Physical Activity:     Days of Exercise per Week:     Minutes of Exercise per Session:    Stress:     Feeling of Stress :    Social Connections:     Frequency of Communication with Friends and Family:     Frequency of Social Gatherings with Friends and Family:     Attends [employer] Services:     Active Member of Clubs or Organizations:     Attends Club or Organization Meetings:     Marital Status:    Intimate Partner Violence:     Fear of Current or Ex-Partner:     Emotionally Abused:     Physically Abused:     Sexually Abused:        Family History:       Problem Relation Age of Onset    Stroke Mother     Diabetes Mother     Heart Disease Mother     High Blood Pressure Mother     Heart Disease Father     Heart Disease Brother     High Blood Pressure Brother     Heart Disease Maternal Grandmother     High Blood Pressure Sister        Diagnostics:    MRI OF THE CERVICAL SPINE WITHOUT CONTRAST 3/12/2021 1:03 pm       TECHNIQUE:   Multiplanar multisequence MRI of the cervical spine was performed without the   administration of intravenous contrast.       COMPARISON:   CT cervical spine performed 02/08/2021.       HISTORY:   ORDERING SYSTEM PROVIDED HISTORY: Stenosis of cervical spine with myelopathy   [facility])   TECHNOLOGIST PROVIDED HISTORY:   evaluate for stenosis   Reason for Exam: Patient is complaining of a stiff neck for the past year. She has difficulty walking.  Patient was a hairdresser for years.       FINDINGS:   BONES/ALIGNMENT: There is straightening of the cervical spine.  The vertebral   body heights are maintained.  There is age-appropriate bone marrow signal.   There is multilevel degenerative disc disease with loss of disc signal.   There is disc space narrowing most pronounced at the C5-6 level.  There is   minimal anterolisthesis of C3 on C4.       SPINAL CORD: The spinal cord is normal in caliber and signal.       SOFT TISSUES: The posterior paraspinal soft tissues are unremarkable.  The   prevertebral soft tissues are unremarkable       C2-C3: There is a disc osteophyte complex with uncovertebral and facet   hypertrophy.  There is canal stenosis measuring 8 mm in AP dimension.  There   is moderate left and mild right foraminal narrowing.       C3-C4: There is a disc osteophyte complex with uncovertebral and facet   hypertrophy.  There is canal stenosis measuring 7 mm in AP dimension.  There   is moderate to severe bilateral foraminal narrowing.       C4-C5: There is a disc osteophyte complex with uncovertebral and facet   hypertrophy.  There is canal stenosis measuring 7 mm in AP dimension.  There   is severe bilateral foraminal narrowing.       C5-C6: There is a disc osteophyte complex with uncovertebral and facet   hypertrophy.  There is canal stenosis measuring 7 mm in AP dimension.  There   is severe bilateral foraminal narrowing.       C6-C7: There is a disc osteophyte complex with uncovertebral and facet   hypertrophy.  There is canal stenosis measuring 9 mm in AP dimension.  There   is moderate left and moderate to severe right foraminal narrowing.       C7-T1: There is no significant disc protrusion, spinal canal stenosis or   neural foraminal narrowing.           Impression   Multilevel degenerative disc disease with associated uncovertebral and facet   hypertrophy with canal stenosis most pronounced at C3-4 and C5-6.       Bilateral foraminal narrowing as described above. CBC: No results for input(s): WBC, RBC, HGB, HCT, MCV, RDW, PLT in the last 72 hours. BMP:   Recent Labs     05/21/21  0629   CREATININE 1.28*      HbA1c:   Lab Results   Component Value Date    LABA1C 5.2 02/22/2021     BNP: No results for input(s): BNP in the last 72 hours. PT/INR: No results for input(s): PROTIME, INR in the last 72 hours. APTT: No results for input(s): APTT in the last 72 hours. CARDIAC ENZYMES: No results for input(s): CKMB, CKMBINDEX, TROPONINT in the last 72 hours. Invalid input(s): CKTOTAL;3  FASTING LIPID PANEL:  Lab Results   Component Value Date    CHOL 103 05/20/2019    HDL 74 03/12/2021    TRIG 66 05/20/2019     LIVER PROFILE: No results for input(s): AST, ALT, ALB, BILIDIR, BILITOT, ALKPHOS in the last 72 hours. Physical Exam:  BP (!) 169/61   Pulse 71   Temp 97.6 °F (36.4 °C) (Oral)   Resp 16   Ht 5' 3\" (1.6 m)   Wt 170 lb (77.1 kg)   SpO2 99%   BMI 30.11 kg/m²     GEN: Well developed, well nourished, no acute distress. HEENT: NCAT, PERRL, EOMI, mucous membranes pink and moist. In Aspen collar. RESP: Lungs clear to auscultation. No rales or rhonchi. Respirations WNL and unlabored. CV: Regular rate rhythm. No murmurs, rubs, or gallops. ABD: soft, non-distended, non-tender. BS+ and equal.  NEURO: A&O x 3. Sensation intact to light touch. DTRs 2+  MSK: Functional ROM BUEs and BLEs. Muscle tone and bulk are normal bilaterally. Strength 4/5 key muscles RUE, 4-/5 L finger extension, otherwise 4/5 key muscles LUE. BLE key muscles 4/5. EXT: No calf tenderness to palpation or edema BLEs. SKIN: Warm dry and intact with good turgor. PSYCH: Mood WNL. Affect WNL. Appropriately interactive. Impression:  Cervical myelopathy: s/p C3-6 posterior laminectomy and fusion 5/21/21. Has oxycodone, acetaminophen, morphine for pain.   HTN/Hyperlipidemia: on atorvastatin, amlodipine, carvedilol, fenofibrate, lisinopril, furosemide  DM: on alogliptin  Depression: on citalopram  Iron deficiency anemia: on ferrous sulfate    Recommendations:    Diagnosis:  Cervical myelopathy  Therapy: Await PT/OT beckie  Medical Necessity: As above  Support: Has supportive  and daughter but  had cardiac surgery 3 weeks ago  Rehab Recommendation: Will await therapy evaluations to determine if she is a candidate for acute rehab. DVT Prophylaxis: to start Lovenox POD #1 - 5/22/21. It was my pleasure to evaluate Shima Ward today. Please call with questions. Maciel Snow MD        This note is created with the assistance of a speech recognition program.  While intending to generate a document that actually reflects the content of the visit, the document can still have some errors including those of syntax and sound a like substitutions which may escape proof reading. In such instances, actual meaning can be extrapolated by contextual diversion.

## 2021-05-21 NOTE — OP NOTE
Operative Note      Patient: Gisella Venegas  YOB: 1950  MRN: [id number]    Date of Procedure: 5/21/2021    Pre-Op Diagnosis: CERVICAL STENOSIS WITH MYELOPATHY    Post-Op Diagnosis: Same       Procedure(s):  POSTERIOR C3-6 LAMINECTOMY, PARTIAL C7 LAMINECTOMY  FUSION C3-C6  Segmental lateral mass fixation C3-C6  Use of morselized autograft and allograft. Surgeon(s):  Sapphire Parker DO    Assistant:   * No surgical staff found *    Anesthesia: General    Estimated Blood Loss (mL): less than 454     Complications: None    Specimens:   ID Type Source Tests Collected by Time Destination   1 : URINE - CASTANEDA INSERTION Urine Urine, indwelling catheter CULTURE, URINE Sapphire Parker DO 5/21/2021 8202        Implants:  Implant Name Type Inv.  Item Serial No.  Lot No. LRB No. Used Action   GRAFT INFLUX 19 [address] - [id number]  GRAFT INFLUX 19 [address] [id number] [employer] [facility] [id number] N/A 1 Implanted   NUCELXL 2.5 CC OR ML BIOACTIVE AMNIO SUSP - [id number]  NUCELXL 2.5 CC OR ML BIOACTIVE AMNIO SUSP [id number] [employer] [facility] [id number] N/A 1 Implanted   NUCELXL 2.5 CC OR ML BIOACTIVE AMNIO SUSP - [id number]  NUCELXL 2.5 CC OR ML BIOACTIVE AMNIO SUSP [id number] [employer] [facility] [id number] N/A 1 Implanted   NUCELXL 2.5 CC OR ML BIOACTIVE AMNIO SUSP - [id number]  NUCELXL 2.5 CC OR ML BIOACTIVE AMNIO SUSP [id number] [employer] [facility] [id number] N/A 1 Implanted   NUCELXL 2.5 CC OR ML BIOACTIVE AMNIO SUSP - [id number]  NUCELXL 2.5 CC OR ML BIOACTIVE AMNIO SUSP [id number] [employer] [facility] [id number] N/A 1 Implanted   DEMINERALIZED PATEL OSTEOCONDUCTIVE FRZ DRY 5CC FIBEROS - [id number]  DEMINERALIZED PATEL OSTEOCONDUCTIVE FRZ DRY 5CC FIBEROS [id number] [employer] [facility] [id number] N/A 1 Implanted   SCREW SPNL LCK POST CERV SPINE SYS PROFICIENT  SCREW SPNL LCK POST CERV SPINE SYS PROFICIENT  SPINE WAVE INC-WD  N/A 8 Implanted   SCREW SPNL POLYAX 3.8X12 MM POST CERV SPINE SYS PROFICIENT  SCREW SPNL POLYAX 3.8X12 MM POST CERV SPINE SYS PROFICIENT  [employer]-WD  N/A 4 Implanted   OG SPNL CRV 3.5X50 MM POST CERV TI PROFICIENT  OG SPNL CRV 3.5X50 MM POST CERV TI PROFICIENT  SPINE [employer]-WD  N/A 2 Implanted   SCREW       N/A 4 Implanted         Drains:   Closed/Suction Drain Posterior Neck Accordion 10 Polish (Active)   Site Description Unable to view 05/21/21 1220   Dressing Status Clean;Dry; Intact 05/21/21 1220   Drainage Appearance Bloody 05/21/21 1220   Status Compressed 05/21/21 1220   Output (ml) 50 ml 05/21/21 1220       Urethral Catheter Non-latex 16 fr (Active)   Catheter Indications Perioperative use for selected surgical procedures 05/21/21 1220   Site Assessment No urethral drainage 05/21/21 1220   Urine Color Yellow 05/21/21 1220   Urine Appearance Clear 05/21/21 1220       Findings: cervical stenosis      Brief hx and indication for surgery: This is a 52-year-old woman who presented with myelopathy. CT and MRI demonstrated cervical stenosis with cord compression. I recommended cervical decompression and fusion to reduce the risk of further spinal cord dysfunction in the future. I showed the patient the scans and proposed surgical treatment on a spine model. I explained to the patient and family the indications for surgery, the expected outcomes, the alternatives surgery which include no surgery, delayed surgery, vs anterior surgery. The risk includes bleeding, pain, infection, CSF leak, spinal cord/nerve damage, stroke, worsening symptoms, hardware failure, need for more surgery, anesthesia and medical complications, death. All questions were answered and I was asked to proceed with surgery        Operative details:    The patient was brought to the operating room by anesthesiologist. Imani Rude IVs were secured, patient was induced and intubated by the anesthesiology team.  A Pereyra judit device was placed in the patient's skull and then patient was turned prone in the operating table with chest bolstered by gel rolls. The back and the legs were was elevated, eyes, arms, bony surface and all points of contact were thoroughly padded, and the Pereyra was secured to the table with the neck in a neutral alignment. Patient was secured to the table by wrapping with tape. C-arm was brought in to confirm good neutral alignment of the spine. The occipital area was shaved and the neck  was thoroughly prepped and carefully and sterilely draped. IV antibiotics were given. A timeout was called and all members operative team agreed to the procedure. A C-arm was bought in to localize the level of the incision. Skin was infiltrated with lidocaine with epinephrine and incised with a 10 blade. Hemostasis obtained with bipolar cautery. The midline raphae was dissected down to the level of the spinous process using knife dissection. A subperiosteal dissection was completed using combination of Bovie and Silverman to expose the lamina and facets. Lateral fluoroscopy was used to localize C3 with an . We then continued dissection to expose C3-C6 lamina and facet joints, minimizing injury to the adjacent  joint capsules. Then using combination of Leksell Kerrison and high-speed drill en bloc C3 to C6 laminectomy was completed. Hemostasis obtained with gentle bipolar,Surgi-Aram, and tamponade. The epidural space was probes superiorly and inferiorly and small superior c7 laminectomy was done and there was no further cord compression. Using standard landmarks, C3-6 lateral mass screws start points were drilled with a high-speed matchstick drill, screw hole was cored with powered twist drill to a depth of 12 mm, tapped, and 4 3.5 mm x 12 mm screws placed into the lateral masses in middle levels and 4 thicker 3.8mm screw were placed in end level for stronger fixation.  Two 50 mm long x 3.5mm diameter rods were  minimally bent and placed on the screw heads and set screws were secured. Final AP and Lateral fluoroscopy show the instrumentation to be in good position. The facet and joint space were burred. The wound was thoroughly irrigated and hemostasis obtained. Morselized autologous bone,  DBX, and stem cell  was placed in the lateral gutters. This completes the fusion process. Final hemostasis was obtained after thorough irrigation with bipolar cautery. Exparel was infiltrated in the dermis and the subcutaneous and muscle layers. A 10 Lao Hemovac drain was placed in subfascial space. Vancomycin powder was placed in the subfascial and subcutaneous space. Wound was closed in multiple layers and skin was dressed with dermabond. The coelho was then disconnected from the table. Patient was turned supine onto a hospital bed, and the Coelho was removed. Patient was extubated and recovered in the PACU without complications. Instrument, sponge, and needle counts were correct prior to wound closure and at the conclusion of the case.        Electronically signed by Ema Gautam DO on 5/21/2021 at 4:43 PM

## 2021-05-21 NOTE — INTERVAL H&P NOTE
Pt Name: Stephenie Schuler  MRN: [id number]  YOB: 1950  Date of evaluation: 5/21/2021    I have reviewed the patient's history and physical examination completed in pre-admission testing on 5/7/21    No changes to history or on examination today, unless noted below.    Negative covid-19 screening on 5/17/21    MAIK SOLER - CHRISTOS APRBUSTER-CNP  5/21/21  6:35 AM

## 2021-05-21 NOTE — PROGRESS NOTES
Neurosurgery Post op Progress Note      POD# 0    s/p posterior C3-6 laminectomy, partial C7 laminectomy, Fusion C3-C6     SUBJECTIVE:      Patient presents with neck stiffness, cervical myelopathy per EHR she has issues since her back surgery a year ago.  She has gait disturbances and uses a walker, frequent falls, left leg weakness    Currently she denies numbness and tingling to bilateral upper and lower extremities   Reports incisional neck pain  In cervical collar       OBJECTIVE      Physical exam   VITALS:    Vitals:    05/21/21 1230   BP:    Pulse:    Resp:    Temp:    SpO2: 99%     INTAKE:      Intake/Output Summary (Last 24 hours) at 5/21/2021 1254  Last data filed at 5/21/2021 1220  Gross per 24 hour   Intake 1600 ml   Output 525 ml   Net 1075 ml            Neurological exam   alert, oriented x3, affect appropriate, no focal neurological deficits, moves all extremities well and no involuntary movements 5/5 strength bilateral upper and lower extremities  She is not walking and out of bed at this time     Wound   Post op wound:  Dressing is clean/dry/intact with no signs of drainage   Drain: maintain and record     ASSESSMENT AND PLAN    80 y.o. female status posterior C3-6 laminectomy, partial C7 laminectomy, Fusion C3-C6  post post op day # 0    - Analgesia: Morphine 2-4mg q2hrs PRN Roxicodone 5-10mg every 4 hours PRN   - Periop Antibiotics: Ancef 2g q8hrs for 3 doses   - Activity: As tolerated  - discontinue guerrero catheter tomorrow   - encourage use of incentive spirometer  - ambulate patient POD 0  - DVT prophylaxis: SCDs  - Diet: Advance as tolerated  - collar while out of bed, ok to remove while eating, drinking and sleeping  - SCD for DVT prophylaxis  - will start Lovenox tomorrow 5/22   - maintain drain and record output     Electronically signed by MAIK Mead - NP on 5/21/2021 at 12:54 PM

## 2021-05-21 NOTE — ANESTHESIA PRE PROCEDURE
Department of Anesthesiology  Preprocedure Note       Name:  Mina Rosales   Age:  80 y.o.  :  1950                                          MRN:  [id number]         Date:  2021      Surgeon: Baldo Lockett):  Brandon Pastor DO    Procedure:   Procedure: POSTERIOR C2-6 LAMINECTOMY, FUSION, SILVERCORD, C-ARM, MICROSCOPE (N/A ) - CPT 49486   Anesthesia type: General   Pre-op diagnosis: CERVICAL STENOSIS WITH MYELOPATHY         Medications prior to admission:   Prior to Admission medications    Medication Sig Start Date End Date Taking?  Authorizing Provider   pantoprazole (PROTONIX) 40 MG tablet Take 1 tablet by mouth 2 times daily (before meals) 21   Cynthia Funk MD   aspirin 81 MG chewable tablet Take 81 mg by mouth daily Indications: pt reports intstructed to hold x 10 days prior to surgery    Historical Provider, MD   citalopram (CELEXA) 20 MG tablet Take 1 tablet by mouth daily  Patient taking differently: Take 20 mg by mouth daily Per pcp 21   Cynthia Funk MD   carvedilol (COREG) 12.5 MG tablet Take 1 tablet by mouth 2 times daily 4/15/21   Cynthia Funk MD   SITagliptin (JANUVIA) 100 MG tablet Take 0.5 tablets by mouth daily  Patient taking differently: Take 50 mg by mouth daily Per pcp 4/15/21   Cynthia Funk MD   amLODIPine (NORVASC) 5 MG tablet Take 1 tablet by mouth daily 4/15/21   Cynthia Funk MD   diphenhydrAMINE HCl (BENADRYL ALLERGY PO) Take 1 capsule by mouth daily Takes q HS    Historical Provider, MD   methocarbamol (ROBAXIN-750) 750 MG tablet Take 3 times a day as need for spasm/pain 21   Brandon Pastor DO   lisinopril (PRINIVIL;ZESTRIL) 30 MG tablet Take 1 tablet by mouth daily 21   MAIK Alvarado - CNP   atorvastatin (LIPITOR) 80 MG tablet Take 0.5 tablets by mouth nightly 21   Cynthia Funk MD   fenofibrate micronized (LOFIBRA) 134 MG capsule Take 1 capsule by mouth every morning (before breakfast) 3/11/21   Cynthia Funk MD   cyanocobalamin ([employer] VITAMIN B12) 1000 MCG tablet Take 1 tablet by mouth daily 12/3/20   Cintia Mccormack MD   furosemide (LASIX) 40 MG tablet Take 1 tablet by mouth 2 times daily 9/1/20   Cintia Mccormack MD   ferrous sulfate (IRON 325) 325 (65 Fe) MG tablet Take 1 tablet by mouth 2 times daily 7/2/20   Harmony Godwin MD   VITAMIN D, ERGOCALCIFEROL, PO Take by mouth    Historical Provider, MD   Lancets MISC 1 each by Does not apply route daily 10/10/19   Garrick Madrigal MD   blood glucose monitor strips Test 2 times a day & as needed for symptoms of irregular blood glucose. 10/10/19   Garrick Madrigal MD   nitroGLYCERIN (NITROSTAT) 0.4 MG SL tablet Place 1 tablet under the tongue every 5 minutes as needed for Chest pain  Patient taking differently: Place 0.4 mg under the tongue every 5 minutes as needed for Chest pain Hasn't taken in about a year 1/8/18   Garrick Madrigal MD   Calcium Carbonate-Vitamin D (CALCIUM 500/D) 500-125 MG-UNIT TABS Take 1 tablet by mouth 2 times daily     Historical Provider, MD       Current medications:    No current facility-administered medications for this visit. No current outpatient medications on file.      Facility-Administered Medications Ordered in Other Visits   Medication Dose Route Frequency Provider Last Rate Last Admin    fentaNYL (SUBLIMAZE) injection 25 mcg  25 mcg Intravenous Q5 Min PRN Justen Vasquez MD        lactated ringers infusion   Intravenous Continuous Justen Vasquez MD        sodium chloride flush 0.9 % injection 5-40 mL  5-40 mL Intravenous 2 times per day Mike Chang MD        sodium chloride flush 0.9 % injection 5-40 mL  5-40 mL Intravenous PRN Justen Vasquez MD        0.9 % sodium chloride infusion  25 mL Intravenous PRN Justen Vasquez MD        lidocaine PF 1 % injection 1 mL  1 mL Intradermal Once PRN Justen Vasquez MD        midazolam PF (VERSED) injection 1 mg  1 mg Intravenous Q10 Min PRN Justen Vasquez MD        lactated ringers infusion 1,000 mL  1,000 mL Intravenous Continuous Justen Garcia MD           Allergies: Allergies   Allergen Reactions    Mobic [Meloxicam]     Bactrim [Sulfamethoxazole-Trimethoprim] Other (See Comments)     sepsis    Codeine Itching    Seasonal        Problem List:    Patient Active Problem List   Diagnosis Code    Other specified disorders of rotator cuff syndrome of shoulder and allied disorders M75.100    Diverticulosis of large intestine K57.30    Intestinal or peritoneal adhesions with obstruction (postoperative) (postinfection) ([address] K56.50    Restless legs syndrome (RLS) G25.81    GERD (gastroesophageal reflux disease) K21.9    Essential hypertension I10    Mixed hyperlipidemia E78.2    Other abnormal glucose R73.09    Atherosclerosis I70.90    Allergic rhinitis J30.9    Vitamin D deficiency E55.9    Depression F32.9    Degenerative joint disease (DJD) of hip M16.9    Peripheral edema R60.9    Injury of foot, left C21.597E    Facial droop R29.810    CVA (cerebral vascular accident) ([address] I63.9    Bradycardia R00.1    Ataxia R27.0    Aphasia R47.01    TIA (transient ischemic attack) G45.9    Need for prophylactic vaccination and inoculation against cholera alone Z23    Osteopenia M85.80    Lumbago M54.5    Meralgia paresthetica of right side G57.11    Lumbar degenerative disc disease M51.36    Spondylosis of lumbar region without myelopathy or radiculopathy M47.816    Blood poisoning [id number]    Cellulitis of left lower extremity L03. 80    Encounter for medication monitoring Z51.81    Deep vein thrombosis (DVT) of right lower extremity (HCC) I82.401    Chronic deep vein thrombosis (DVT) of proximal vein of both lower extremities (HCC) I82.5Y3    Chronic diastolic heart failure (HCC) I50.32    Neurogenic claudication due to lumbar spinal stenosis M48.062    Sacroiliitis (HCC) M46.1    Stage 3 chronic kidney disease N18.30    Type 2 diabetes mellitus with circulatory disorder, without long-term current use of insulin (HCC) E11.59    Chronic deep vein thrombosis (DVT) of popliteal vein of right lower extremity ([facility]) I82.531    Closed fracture of left wrist S62.102A    B12 deficiency E53.8    Iron deficiency anemia secondary to inadequate dietary iron intake D50.8    Closed head injury S09.90XA    Scalp laceration S01. 01XA    Abnormal finding on imaging R93.89    Other irritable bowel syndrome K58.8    Frequent falls R29.6    Double vision H53.2    Muscle soreness M79.10    Peptic ulcer K27.9    Melena K92.1    PUD (peptic ulcer disease) K27.9    Absolute anemia D64.9    Acute blood loss anemia D62    Acute renal failure (HCC) N17.9    Clostridium difficile infection A49.8    Acquired spondylolisthesis M43.10    Spinal stenosis of lumbar region with neurogenic claudication M48.062    Acute deep vein thrombosis (DVT) of proximal vein of left lower extremity (HCC) I82.4Y2    Leg swelling M79.89    Back pain M54.9    Neurological disorder G98.8    Closed unstable burst fracture of fifth lumbar vertebra with routine healing S32.052D    Slow transit constipation K59.01    Major depressive disorder, recurrent, moderate ([facility]) F33.1    Acute deep vein thrombosis (DVT) of femoral vein of left lower extremity ([facility]) I82.412    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[facility]) M48.02, G99.2       Past Medical History:        Diagnosis Date    Allergic rhinitis, cause unspecified     Back pain     lumbar    Bowel obstruction (HCC)     history of due to scar tissue, resolved non-surgically    C. difficile diarrhea     CAD (coronary artery disease)     no stent needed per pt.  Dr. Devine Re did cath at  2005    Cardiac murmur     Cellulitis     left leg    Cellulitis 2017 August    leg left leg/bug bite    Cerebral artery occlusion with cerebral infarction [facility])     TIA 2014    COVID-19     ONE YR AGO IN 4/25/2020 fever and cough    Diverticulosis of colon (without mention of hemorrhage)     GERD (gastroesophageal reflux disease)     GERD (gastroesophageal reflux disease)     on rx    History of blood transfusion     approx 2020        History of CHF (congestive heart failure)     History of MI (myocardial infarction) 2005    thought due to a blood clot    History of ovarian cyst 1970    had oopherectomy holly    History of peritonitis 1968    due to ruptured appendix age 13    HTN (hypertension)     Hx of blood clots     right leg    Hyperlipidemia     Intestinal or peritoneal adhesions with obstruction (postoperative) (postinfection) ([address]     Kidney infection     renal failure/sepsis/spider bite    Lateral epicondylitis  of elbow     MDRO (multiple drug resistant organisms) resistance     c diff    Muscle strain     right posterior shoulder    Other abnormal glucose     PONV (postoperative nausea and vomiting)     dry heaves    Pre-diabetes     Restless legs syndrome (RLS)     Snores     no cpap    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)     TIA (transient ischemic attack) 2014    Uses walker     Vitamin D deficiency     Wears glasses     Wellness examination     last seen 2 weeks ago       Past Surgical History:        Procedure Laterality Date    ABDOMEN SURGERY  1976    benign tumor removed near remaining ovary, 1.5 pounds    APPENDECTOMY  1968    appendix ruptured, developed peritonitis    BACK SURGERY      BUNIONECTOMY Left     along with calcium deposits removed   [address]  2005    negative    COLECTOMY  1969    12 INCHES REMOVED D/T OBSTRUCTION    COLONOSCOPY      CYST REMOVAL Right     right facial    HYSTERECTOMY  1973    taken as a result of recurring cysts    LUMBAR FUSION N/A 2/10/2020    LUMBAR L4-5 POSTERIOR  DECOMPRESSION INSTRUMENTATION FUSION WCEMENT AUGMENTATION/ performed by Jamaal Luke MD at [place] [address] N/A 6/17/2020    L5-S1 PLIF L4-L5 REVISION performed by Clarisse Corral MD at [facility]  8/14/14    FESS    OVARY REMOVAL  1970    UNILATERAL due to cyst    OVARY REMOVAL  1971    partial, due to cyst   [employer] SINUS SURGERY  2004    UPPER GASTROINTESTINAL ENDOSCOPY N/A 5/31/2019    EGD ESOPHAGOGASTRODUODENOSCOPY performed by Palma Moreno MD at [address] [facility] N/A 8/5/2019    EGD BIOPSY performed by Jorge Perez MD at [facility] N/A 8/23/2019    EGD BIOPSY performed by Palma Moreno MD at [address] [facility] 3/5/2019    WRIST OPEN REDUCTION INTERNAL FIXATION performed by Anders Condon MD at [address] [facility] History:    Social History     Tobacco Use    Smoking status: Former Smoker     Packs/day: 0.50     Years: 20.00     Pack years: 10.00     Types: Cigarettes     Start date: 8/11/1995     Quit date: 6/20/2017     Years since quitting: 3.9    Smokeless tobacco: Never Used   Substance Use Topics    Alcohol use: No     Alcohol/week: 0.0 standard drinks                                Counseling given: Not Answered      Vital Signs (Current): There were no vitals filed for this visit.                                            BP Readings from Last 3 Encounters:   05/21/21 (!) 189/68   05/07/21 (!) 163/90   04/22/21 124/60       NPO Status:                                                                                 BMI:   Wt Readings from Last 3 Encounters:   05/21/21 170 lb (77.1 kg)   05/07/21 178 lb 1.9 oz (80.8 kg)   04/22/21 179 lb 12.8 oz (81.6 kg)     There is no height or weight on file to calculate BMI.    CBC:   Lab Results   Component Value Date    WBC 8.8 05/07/2021    RBC 3.90 05/07/2021    HGB 12.4 05/07/2021    HCT 39.1 05/07/2021    .3 05/07/2021    RDW 13.3 05/07/2021     05/07/2021       CMP:   Lab Results   Component Value Date     05/07/2021    K 4.2 05/07/2021     05/07/2021    CO2 22 05/07/2021    BUN 64 05/07/2021    CREATININE 1.28 05/21/2021    CREATININE 1.52 05/07/2021    GFRAA 41 05/07/2021    LABGLOM 41 05/21/2021    GLUCOSE 134 05/07/2021    PROT 7.2 01/27/2020    CALCIUM 9.2 06/25/2020    BILITOT 0.31 01/27/2020    ALKPHOS 39 01/27/2020    AST 22 01/27/2020    ALT 17 01/27/2020       POC Tests:   Recent Labs     05/21/21  0629   POCGLU 102*   POCK 3.7       Coags:   Lab Results   Component Value Date    PROTIME 13.1 04/08/2020    INR 1.0 04/08/2020    APTT 30.2 04/08/2020       HCG (If Applicable): No results found for: PREGTESTUR, PREGSERUM, HCG, HCGQUANT     ABGs: No results found for: PHART, PO2ART, QUL8UNH, QGB9ISB, BEART, J2FTZODG     Type & Screen (If Applicable):  No results found for: LABABO, LABRH    Drug/Infectious Status (If Applicable):  No results found for: HIV, HEPCAB    COVID-19 Screening (If Applicable):   Lab Results   Component Value Date    COVID19 Not Detected 05/17/2021    COVID19 Not Detected 06/13/2020         Anesthesia Evaluation  Patient summary reviewed and Nursing notes reviewed   history of anesthetic complications: PONV. Airway: Mallampati: II  TM distance: >3 FB   Neck ROM: full  Mouth opening: > = 3 FB Dental: normal exam         Pulmonary: breath sounds clear to auscultation  (+) shortness of breath:      (-) rhonchi, wheezes, rales and stridor                           Cardiovascular:    (+) hypertension: no interval change, CAD: no interval change,     (-) murmur, weak pulses,  friction rub, systolic click, carotid bruit,  JVD and peripheral edema    ECG reviewed  Rhythm: regular  Rate: normal  Echocardiogram reviewed               ROS comment: Hyperdynamic left ventricular function. Estimated LV EF 65 %. Mild left ventricular hypertrophy.   EKG: NSR     Neuro/Psych:   (+) neuromuscular disease:, TIA, psychiatric history:   (-) CVA           GI/Hepatic/Renal:   (+) GERD:, PUD,           Endo/Other:    (+) DiabetesType II DM, , : arthritis:., .                 Abdominal:           Vascular:                                          Anesthesia Plan      general     ASA 3       Induction: intravenous. MIPS: Postoperative opioids intended and Prophylactic antiemetics administered. Anesthetic plan and risks discussed with patient. Plan discussed with CRNA. CONCLUSIONS     Summary  Normal left ventricle size. Hyperdynamic left ventricular function. Estimated LV EF 65 %. Mild left ventricular hypertrophy. Grade I Mild left ventricular diastolic dysfunction. [address] mitral regurgitation.   Mild tricuspid regurgitation    Discussed possible postop ventilation      Nathanael Haddad MD   5/21/2021

## 2021-05-21 NOTE — ANESTHESIA POSTPROCEDURE EVALUATION
Department of Anesthesiology  Postprocedure Note    Patient: Karrie Chiang  MRN: [id number]  YOB: 1950  Date of evaluation: 5/21/2021  Time:  11:53 AM     Procedure Summary     Date: 05/21/21 Room / Location: [address]    Anesthesia Start: 2405 Anesthesia Stop: 5036    Procedure: POSTERIOR C3-6 LAMINECTOMY, PARTIAL C7 LAMINECTOMY, FUSION C3-C6, SILVERCORD (N/A Spine Cervical) Diagnosis: (CERVICAL STENOSIS WITH MYELOPATHY)    Surgeons: Jeremy Lehman DO Responsible Provider: Rhina Maddox MD    Anesthesia Type: general ASA Status: 3          Anesthesia Type: general    Elver Phase I:      Elver Phase II:      Last vitals: Reviewed and per EMR flowsheets.        Anesthesia Post Evaluation    Patient location during evaluation: PACU  Patient participation: complete - patient participated  Level of consciousness: awake  Pain score: 1  Airway patency: patent  Nausea & Vomiting: no nausea and no vomiting  Complications: no  Cardiovascular status: blood pressure returned to baseline and hemodynamically stable  Respiratory status: acceptable  Hydration status: euvolemic

## 2021-05-21 NOTE — BRIEF OP NOTE
Brief Postoperative Note      Patient: Jhoan Garcia  YOB: 1950  MRN: [id number]    Date of Procedure: 5/21/2021    Pre-Op Diagnosis: CERVICAL STENOSIS WITH MYELOPATHY    Post-Op Diagnosis: Same       Procedure(s):  POSTERIOR C3-6 LAMINECTOMY, PARTIAL C7 LAMINECTOMY, FUSION C3-C6, SILVERCORD    Surgeon(s):  Jewels Lancaster DO    Assistant:  * No surgical staff found *    Anesthesia: General    Estimated Blood Loss (mL): less than 350     Complications: None    Specimens:   ID Type Source Tests Collected by Time Destination   1 : URINE - CASTANEDA INSERTION Urine Urine, indwelling catheter CULTURE, URINE Jewels Lancaster DO 5/21/2021 2798        Implants:  Implant Name Type Inv.  Item Serial No.  Lot No. LRB No. Used Action   GRAFT INFLUX SPARC [facility] - [id number]  GRAFT INFLUX 19 [address] [id number] [employer] [facility] [address] N/A 1 Implanted   NUCELXL 2.5 CC OR ML BIOACTIVE AMNIO SUSP - [id number]  NUCELXL 2.5 CC OR ML BIOACTIVE AMNIO SUSP [id number] [employer] [facility] [id number] N/A 1 Implanted   NUCELXL 2.5 CC OR ML BIOACTIVE AMNIO SUSP - [id number]  NUCELXL 2.5 CC OR ML BIOACTIVE AMNIO SUSP [id number] [employer]GAN[employer] [facility]- [id number] N/A 1 Implanted   NUCELXL 2.5 CC OR ML BIOACTIVE AMNIO SUSP - [id number]  NUCELXL 2.5 CC OR ML BIOACTIVE AMNIO SUSP [id number] ORGANOGENESIS INCIdentified [id number] N/A 1 Implanted   NUCELXL 2.5 CC OR ML BIOACTIVE AMNIO SUSP - [id number]  NUCELXL 2.5 CC OR ML BIOACTIVE AMNIO SUSP [id number] [employer]- [id number] N/A 1 Implanted   DEMINERALIZED APTEL OSTEOCONDUCTIVE FRZ DRY 5CC FIBEROS - [id number]  DEMINERALIZED PATEL OSTEOCONDUCTIVE FRZ DRY 5CC FIBEROS [id number] [employer]- [id number] N/A 1 Implanted   SCREW SPNL LCK POST CERV SPINE SYS PROFICIENT  SCREW SPNL LCK POST CERV SPINE SYS PROFICIENT  SPINE [employer]-WD  N/A 8 Implanted   SCREW SPNL POLYAX 3.8X12 MM POST CERV SPINE SYS PROFICIENT  SCREW SPNL POLYAX 3.8X12 MM POST CERV SPINE SYS PROFICIENT  [employer]-WD  N/A 4 Implanted   OG SPNL CRV 3.5X50 MM POST CERV TI PROFICIENT  OG SPNL CRV 3.5X50 MM POST CERV TI PROFICIENT  [employer]INE [employer]-WD  N/A 2 Implanted   SCREW       N/A 4 Implanted         Drains:   Closed/Suction Drain Posterior Neck Accordion 10 [place] (Active)   Site Description Unable to view 05/21/21 1220   Dressing Status Clean;Dry; Intact 05/21/21 1220   Drainage Appearance Bloody 05/21/21 1220   Status Compressed 05/21/21 1220   Output (ml) 50 ml 05/21/21 1220       Urethral Catheter Non-latex 16 fr (Active)   Catheter Indications Perioperative use for selected surgical procedures 05/21/21 1220   Site Assessment No urethral drainage 05/21/21 1220   Urine Color Yellow 05/21/21 1220   Urine Appearance Clear 05/21/21 1220       Findings: cervical stenosis    Electronically signed by Aleta Casarez DO on 5/21/2021 at 4:42 PM

## 2021-05-22 ENCOUNTER — APPOINTMENT (OUTPATIENT)
Dept: GENERAL RADIOLOGY | Age: 71
DRG: 472 | End: 2021-05-22
Attending: NEUROLOGICAL SURGERY
Payer: MEDICARE

## 2021-05-22 ENCOUNTER — APPOINTMENT (OUTPATIENT)
Dept: CT IMAGING | Age: 71
DRG: 472 | End: 2021-05-22
Attending: NEUROLOGICAL SURGERY
Payer: MEDICARE

## 2021-05-22 LAB
CULTURE: NO GROWTH
GLUCOSE BLD-MCNC: 109 MG/DL (ref 65–105)
GLUCOSE BLD-MCNC: 115 MG/DL (ref 65–105)
GLUCOSE BLD-MCNC: 129 MG/DL (ref 65–105)
GLUCOSE BLD-MCNC: 145 MG/DL (ref 65–105)
HCT VFR BLD CALC: 33.1 % (ref 36.3–47.1)
HEMOGLOBIN: 10.4 G/DL (ref 11.9–15.1)
Lab: NORMAL
MCH RBC QN AUTO: 31.7 PG (ref 25.2–33.5)
MCHC RBC AUTO-ENTMCNC: 31.4 G/DL (ref 28.4–34.8)
MCV RBC AUTO: 100.9 FL (ref 82.6–102.9)
NRBC AUTOMATED: 0 PER 100 WBC
PDW BLD-RTO: 13.2 % (ref 11.8–14.4)
PLATELET # BLD: 281 K/UL (ref 138–453)
PMV BLD AUTO: 9.9 FL (ref 8.1–13.5)
RBC # BLD: 3.28 M/UL (ref 3.95–5.11)
SPECIMEN DESCRIPTION: NORMAL
WBC # BLD: 15.1 K/UL (ref 3.5–11.3)

## 2021-05-22 PROCEDURE — 1200000000 HC SEMI PRIVATE

## 2021-05-22 PROCEDURE — 97530 THERAPEUTIC ACTIVITIES: CPT

## 2021-05-22 PROCEDURE — APPSS30 APP SPLIT SHARED TIME 16-30 MINUTES: Performed by: PHYSICIAN ASSISTANT

## 2021-05-22 PROCEDURE — 97535 SELF CARE MNGMENT TRAINING: CPT

## 2021-05-22 PROCEDURE — 97162 PT EVAL MOD COMPLEX 30 MIN: CPT

## 2021-05-22 PROCEDURE — 82947 ASSAY GLUCOSE BLOOD QUANT: CPT

## 2021-05-22 PROCEDURE — 36415 COLL VENOUS BLD VENIPUNCTURE: CPT

## 2021-05-22 PROCEDURE — 6360000002 HC RX W HCPCS: Performed by: NURSE PRACTITIONER

## 2021-05-22 PROCEDURE — 6370000000 HC RX 637 (ALT 250 FOR IP): Performed by: STUDENT IN AN ORGANIZED HEALTH CARE EDUCATION/TRAINING PROGRAM

## 2021-05-22 PROCEDURE — 2580000003 HC RX 258: Performed by: NURSE PRACTITIONER

## 2021-05-22 PROCEDURE — 6360000002 HC RX W HCPCS: Performed by: PHYSICIAN ASSISTANT

## 2021-05-22 PROCEDURE — 72040 X-RAY EXAM NECK SPINE 2-3 VW: CPT

## 2021-05-22 PROCEDURE — 85027 COMPLETE CBC AUTOMATED: CPT

## 2021-05-22 PROCEDURE — 97166 OT EVAL MOD COMPLEX 45 MIN: CPT

## 2021-05-22 PROCEDURE — 73523 X-RAY EXAM HIPS BI 5/> VIEWS: CPT

## 2021-05-22 PROCEDURE — 6370000000 HC RX 637 (ALT 250 FOR IP): Performed by: NURSE PRACTITIONER

## 2021-05-22 PROCEDURE — 72125 CT NECK SPINE W/O DYE: CPT

## 2021-05-22 RX ADMIN — DESMOPRESSIN ACETATE 40 MG: 0.2 TABLET ORAL at 21:37

## 2021-05-22 RX ADMIN — FERROUS SULFATE TAB EC 325 MG (65 MG FE EQUIVALENT) 325 MG: 325 (65 FE) TABLET DELAYED RESPONSE at 09:03

## 2021-05-22 RX ADMIN — ALOGLIPTIN 12.5 MG: 12.5 TABLET, FILM COATED ORAL at 09:03

## 2021-05-22 RX ADMIN — AMLODIPINE BESYLATE 5 MG: 5 TABLET ORAL at 09:03

## 2021-05-22 RX ADMIN — MORPHINE SULFATE 4 MG: 4 INJECTION INTRAVENOUS at 17:45

## 2021-05-22 RX ADMIN — OXYCODONE HYDROCHLORIDE 10 MG: 5 TABLET ORAL at 01:25

## 2021-05-22 RX ADMIN — CARVEDILOL 12.5 MG: 12.5 TABLET, FILM COATED ORAL at 09:03

## 2021-05-22 RX ADMIN — METHOCARBAMOL TABLETS 750 MG: 750 TABLET, COATED ORAL at 13:00

## 2021-05-22 RX ADMIN — METHOCARBAMOL TABLETS 750 MG: 750 TABLET, COATED ORAL at 17:00

## 2021-05-22 RX ADMIN — CEFAZOLIN 2000 MG: 10 INJECTION, POWDER, FOR SOLUTION INTRAVENOUS at 09:04

## 2021-05-22 RX ADMIN — CITALOPRAM 20 MG: 20 TABLET, FILM COATED ORAL at 09:03

## 2021-05-22 RX ADMIN — PANTOPRAZOLE SODIUM 40 MG: 40 TABLET, DELAYED RELEASE ORAL at 15:50

## 2021-05-22 RX ADMIN — PANTOPRAZOLE SODIUM 40 MG: 40 TABLET, DELAYED RELEASE ORAL at 06:11

## 2021-05-22 RX ADMIN — METHOCARBAMOL TABLETS 750 MG: 750 TABLET, COATED ORAL at 21:37

## 2021-05-22 RX ADMIN — Medication 6 MG: at 00:21

## 2021-05-22 RX ADMIN — OXYCODONE HYDROCHLORIDE 5 MG: 5 TABLET ORAL at 06:11

## 2021-05-22 RX ADMIN — ACETAMINOPHEN 650 MG: 325 TABLET ORAL at 00:21

## 2021-05-22 RX ADMIN — Medication 1 TABLET: at 21:37

## 2021-05-22 RX ADMIN — CEFAZOLIN 2000 MG: 10 INJECTION, POWDER, FOR SOLUTION INTRAVENOUS at 00:21

## 2021-05-22 RX ADMIN — ACETAMINOPHEN 650 MG: 325 TABLET ORAL at 12:00

## 2021-05-22 RX ADMIN — FUROSEMIDE 40 MG: 40 TABLET ORAL at 09:03

## 2021-05-22 RX ADMIN — Medication 1000 MCG: at 09:03

## 2021-05-22 RX ADMIN — CARVEDILOL 12.5 MG: 12.5 TABLET, FILM COATED ORAL at 21:37

## 2021-05-22 RX ADMIN — METHOCARBAMOL TABLETS 750 MG: 750 TABLET, COATED ORAL at 09:03

## 2021-05-22 RX ADMIN — OXYCODONE HYDROCHLORIDE 10 MG: 5 TABLET ORAL at 11:16

## 2021-05-22 RX ADMIN — ACETAMINOPHEN 650 MG: 325 TABLET ORAL at 06:11

## 2021-05-22 RX ADMIN — OXYCODONE HYDROCHLORIDE 10 MG: 5 TABLET ORAL at 23:37

## 2021-05-22 RX ADMIN — ACETAMINOPHEN 650 MG: 325 TABLET ORAL at 23:37

## 2021-05-22 RX ADMIN — LISINOPRIL 30 MG: 20 TABLET ORAL at 09:03

## 2021-05-22 RX ADMIN — MORPHINE SULFATE 4 MG: 4 INJECTION INTRAVENOUS at 09:16

## 2021-05-22 RX ADMIN — FENOFIBRATE 134 MG: 54 TABLET ORAL at 06:11

## 2021-05-22 RX ADMIN — SODIUM CHLORIDE: 9 INJECTION, SOLUTION INTRAVENOUS at 21:07

## 2021-05-22 RX ADMIN — ENOXAPARIN SODIUM 40 MG: 40 INJECTION, SOLUTION INTRAVENOUS; SUBCUTANEOUS at 09:16

## 2021-05-22 RX ADMIN — FERROUS SULFATE TAB EC 325 MG (65 MG FE EQUIVALENT) 325 MG: 325 (65 FE) TABLET DELAYED RESPONSE at 21:37

## 2021-05-22 RX ADMIN — OXYCODONE HYDROCHLORIDE 10 MG: 5 TABLET ORAL at 15:50

## 2021-05-22 RX ADMIN — Medication 1 TABLET: at 09:03

## 2021-05-22 ASSESSMENT — PAIN SCALES - GENERAL
PAINLEVEL_OUTOF10: 4
PAINLEVEL_OUTOF10: 5
PAINLEVEL_OUTOF10: 10
PAINLEVEL_OUTOF10: 8
PAINLEVEL_OUTOF10: 7
PAINLEVEL_OUTOF10: 6
PAINLEVEL_OUTOF10: 8
PAINLEVEL_OUTOF10: 7
PAINLEVEL_OUTOF10: 5
PAINLEVEL_OUTOF10: 8
PAINLEVEL_OUTOF10: 7
PAINLEVEL_OUTOF10: 8
PAINLEVEL_OUTOF10: 7

## 2021-05-22 ASSESSMENT — PAIN DESCRIPTION - LOCATION
LOCATION: NECK
LOCATION: HIP
LOCATION: HEAD;NECK
LOCATION: HEAD;NECK

## 2021-05-22 ASSESSMENT — PAIN DESCRIPTION - ORIENTATION: ORIENTATION: LEFT

## 2021-05-22 ASSESSMENT — PAIN DESCRIPTION - PAIN TYPE
TYPE: ACUTE PAIN

## 2021-05-22 ASSESSMENT — PAIN DESCRIPTION - FREQUENCY: FREQUENCY: CONTINUOUS

## 2021-05-22 ASSESSMENT — PAIN DESCRIPTION - ONSET: ONSET: ON-GOING

## 2021-05-22 ASSESSMENT — PAIN DESCRIPTION - DESCRIPTORS
DESCRIPTORS: ACHING
DESCRIPTORS: DISCOMFORT
DESCRIPTORS: DISCOMFORT

## 2021-05-22 ASSESSMENT — PAIN DESCRIPTION - PROGRESSION: CLINICAL_PROGRESSION: NOT CHANGED

## 2021-05-22 NOTE — PROGRESS NOTES
Neurosurgery TRICIA/Resident    Daily Progress Note   No chief complaint on file. 5/22/2021  8:24 AM    Chart reviewed. No acute events overnight. No new complaints. Resting comfortably in bed. Guerrero still in place post op. Tolerating diet. Has not been out of bed post op.     Vitals:    05/21/21 1604 05/21/21 1936 05/22/21 0033 05/22/21 0345   BP: (!) 146/79 (!) 191/82 (!) 155/68 (!) 154/61   Pulse: 79 89 74 63   Resp: 16 18 16 16   Temp: 98.5 °F (36.9 °C) 98.8 °F (37.1 °C) 98.3 °F (36.8 °C) 98.3 °F (36.8 °C)   TempSrc: Oral Oral Oral Oral   SpO2: 98% 95% 95% 97%   Weight:       Height:           PE:   AOx3   Motor   L deltoid 4/5; R deltoid 5/5  L biceps 4/5; R biceps 5/5  L triceps 4/5; R triceps 5/5  L wrist extension 5/5; R wrist extension 5/5  L intrinsics 5/5; R intrinsics 5/5      L iliopsoas 5/5 , R iliopsoas 5/5  L quadriceps 5/5; R quadriceps 5/5  L Dorsiflexion 5/5; R dorsiflexion 5/5  L Plantarflexion 5/5; R plantarflexion 5/5  L EHL 5/5; R EHL 5/5    Sensation intact     Drain output 155ml/12hr  Incision dressing c/d/i      Lab Results   Component Value Date    WBC 15.1 (H) 05/22/2021    HGB 10.4 (L) 05/22/2021    HCT 33.1 (L) 05/22/2021     05/22/2021    CHOL 103 05/20/2019    TRIG 66 05/20/2019    HDL 74 03/12/2021    ALT 17 01/27/2020    AST 22 01/27/2020     05/07/2021    K 4.2 05/07/2021     05/07/2021    CREATININE 1.28 (H) 05/21/2021    BUN 64 (H) 05/07/2021    CO2 22 05/07/2021    TSH 1.43 05/20/2019    INR 1.0 04/08/2020    LABA1C 5.2 02/22/2021    LABMICR 11 08/05/2015    CRP 0.5 01/25/2018    SEDRATE 12 08/25/2019         A/P  80 y.o. female who presents with cervical myelopathy  POD 1 s/p C3-6 posterior laminectomy and fusion     - Remove guerrero this morning    - Obtain cervical spine post op upright films   - Aspen collar to be worn when out of bed    - Encourage mobilization    - Continue JULIO drain  - Lovenox and SCDs for dvt ppx  - PT, OT, PM&R for eval and rehab placement   - Encourage IS      Please contact neurosurgery with any changes in patients neurologic status.        KRISHNA Bear   8:24 AM EDT

## 2021-05-22 NOTE — PLAN OF CARE
Problem: Falls - Risk of:  Goal: Will remain free from falls  Description: Will remain free from falls  5/22/2021 1846 by Gleen Alpers, RN  Outcome: Not Met This Shift  5/22/2021 1650 by Gleen Alpers, RN  Outcome: Met This Shift  Goal: Absence of physical injury  Description: Absence of physical injury  5/22/2021 1846 by Gleen Alpers, RN  Outcome: Not Met This Shift  5/22/2021 1650 by Gleen Alpers, RN  Outcome: Met This Shift      Patient had witnessed fall In her room. See prior note.

## 2021-05-22 NOTE — PROGRESS NOTES
Occupational Therapy   Occupational Therapy Initial Assessment  Date: 2021   Patient Name: Joseph Sharma  MRN: [id number]     : 1950     \"Criselda Tinoco is a 80 y.o. female who presents for PAT appointment. Patient complains of \"stiff neck, can't turn and can't hold up real good especially at the end of the day\". She reports this has been an issue since her back surgery one year ago (Dr. José Luis Banegas) and that symptoms have progressively worsened. Patient has been scheduled for  C2-6 LAMINECTOMY, FUSION.  \"    Date of Service: 2021    Discharge Recommendations:    Further therapy recommended at discharge. Assessment   Performance deficits / Impairments: Decreased functional mobility ; Decreased ADL status; Decreased endurance;Decreased high-level IADLs;Decreased strength;Decreased coordination;Decreased balance  Assessment: pt would be unsafe to return to prior living arrangement at this time due to required heavy skilled assistance for functional transfers/mobility. pt would benefit from further therapy at discharge in order to increase safety and independence with ADLs and functional mobility. Treatment Diagnosis: cervical fusion  Prognosis: Good  Decision Making: Medium Complexity  Patient Education: pt ed on POc, purpose of eval, importance of movement, safety during functional transfers, hand placement during transfers. good return  REQUIRES OT FOLLOW UP: Yes  Activity Tolerance  Activity Tolerance: Patient Tolerated treatment well  Safety Devices  Safety Devices in place: Yes  Type of devices: Gait belt;Call light within reach; Left in bed;Bed alarm in place; Patient at risk for falls  Restraints  Initially in place: No           Patient Diagnosis(es): There were no encounter diagnoses.      has a past medical history of Allergic rhinitis, cause unspecified, Back pain, Bowel obstruction (HCC), C. difficile diarrhea, CAD (coronary artery disease), Cardiac murmur, Cellulitis, Cellulitis, Cerebral artery occlusion with cerebral infarction ([address] COVID-19, Diverticulosis of colon (without mention of hemorrhage), GERD (gastroesophageal reflux disease), GERD (gastroesophageal reflux disease), History of blood transfusion, History of CHF (congestive heart failure), History of MI (myocardial infarction), History of ovarian cyst, History of peritonitis, HTN (hypertension), Hx of blood clots, Hyperlipidemia, Intestinal or peritoneal adhesions with obstruction (postoperative) (postinfection) ([address] Kidney infection, Lateral epicondylitis  of elbow, MDRO (multiple drug resistant organisms) resistance, Muscle strain, Other abnormal glucose, PONV (postoperative nausea and vomiting), Pre-diabetes, Restless legs syndrome (RLS), Snores, 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[address] TIA (transient ischemic attack), Uses walker, Vitamin D deficiency, Wears glasses, and Wellness examination. has a past surgical history that includes Ovary removal (1970); colectomy (4974); Appendectomy (1968); Ovary removal (1971); Hysterectomy (1973); Bunionectomy (Left); sinus surgery (2004); Colonoscopy; other surgical history (08/14/2014); cyst removal (Right); Wrist fracture surgery (Left, 03/05/2019); Upper gastrointestinal endoscopy (N/A, 05/31/2019); Upper gastrointestinal endoscopy (N/A, 08/05/2019); Upper gastrointestinal endoscopy (N/A, 08/23/2019); Abdomen surgery (1976); lumbar fusion (N/A, 02/10/2020); Cardiac catheterization; Cardiac catheterization (2005); [place] tooth extraction; lumbar fusion (N/A, 06/17/2020); back surgery; and cervical fusion (05/21/2021).     Treatment Diagnosis: cervical fusion      Restrictions  Restrictions/Precautions  Restrictions/Precautions: Fall Risk  Required Braces or Orthoses?: Yes  Required Braces or Orthoses  Cervical: c-collar (except while eating, drinking or sleeping)  Position Activity Restriction  Other position/activity restrictions: activity as tolerated, C3-C6 fusion 5/21    Subjective posterior lean occasionally requiring CGA and VC to correct)  Standing Balance: Minimal assistance (with RW)  Standing Balance  Time: ~3 minutes  Activity: pt completed side steps toward [facility]  Comment: pt experience L LE buckling initially upon standing required mod A to maintain balance. ADL  Feeding: Modified independent   Grooming: Supervision  UE Bathing: Stand by assistance  LE Bathing: Moderate assistance  UE Dressing: Stand by assistance  LE Dressing: Moderate assistance  Toileting: Moderate assistance  Additional Comments: OT facilitate pt in brushing teeth, washing face, donning gown around back and donning B socks while sitting on EOb. pt able to brush teeth and wash face while supervision and set up. pt required SBA for donning gown due to line management. pt required mod A for donning B socks due to limited trunk flexion and decreased coordination  Tone RUE  RUE Tone: Normotonic  Tone LUE  LUE Tone: Normotonic  Coordination  Movements Are Fluid And Coordinated: No  Coordination and Movement description: Ataxia; Left UE  Quality of Movement Other  Comment: pt exhibited slightly ataxic movements with L UE     Bed mobility  Supine to Sit: Moderate assistance;2 Person assistance  Sit to Supine: Moderate assistance  Scooting:  Moderate assistance  Transfers  Sit to stand: Minimal assistance;2 Person assistance  Stand to sit: Minimal assistance;2 Person assistance  Transfer Comments: with RW     Cognition  Overall Cognitive Status: Exceptions  Safety Judgement: Decreased awareness of need for assistance  Insights: Decreased awareness of deficits  Sequencing: Requires cues for some        Sensation  Overall Sensation Status: WFL        LUE AROM (degrees)  LUE AROM : Exceptions  L Shoulder Flexion 0-180: 0-90 due to c-collar  L Elbow Flexion 0-145: WFL  L Wrist Flexion 0-80: WFL  L Wrist Extension 0-70: WFL  Left Hand AROM (degrees)  Left Hand AROM: WFL  RUE AROM (degrees)  RUE AROM : Exceptions  R Shoulder Flexion 0-180: 0-90 due to c-collar  R Elbow Flexion 0-145: WFL  R Wrist Flexion 0-80: WFL  R Wrist Extension 0-70: WFL  Right Hand AROM (degrees)  Right Hand AROM: WFL  LUE Strength  Gross LUE Strength: Exceptions to [facility]  L Hand General: 4-/5  RUE Strength  Gross RUE Strength: Exceptions to [facility]  R Hand General: 4/5         Plan   Plan  Times per week: 3-5x/wk    AM-PAC Score        AM-PAC Inpatient Daily Activity Raw Score: 16 (05/22/21 1433)  AM-PAC Inpatient ADL T-Scale Score : 35.96 (05/22/21 1433)  ADL Inpatient CMS 0-100% Score: 53.32 (05/22/21 1433)  ADL Inpatient CMS G-Code Modifier : CK (05/22/21 1433)    Goals  Short term goals  Time Frame for Short term goals: pt will, by discharge  Short term goal 1: complete LB ADLs and toileting tasks with min A, set up and Ae, as needed  Short term goal 2: complete UB ADLs and grooming tasks with mod I and set up  Short term goal 3: increase activity tolerance to 25+ minutes in order to participate in daily tasks  Short term goal 4: dem CGA during functional transfers/functional mobility with LRD and no buckling/LOB  Short term goal 5: dem ~5 minutes static/dynamic standing balance with CGA and LRD in order to complete functional tasks  Short term goal 6: participate in ~10 minutes meaningful activity in order to increase L UE coordination       Therapy Time   Individual Concurrent Group Co-treatment   Time In 0907         Time Out 0950         Minutes 43      co-eval with PT    Timed Code Treatment Minutes: [address] [facility], OTR/L

## 2021-05-22 NOTE — PROGRESS NOTES
Jazmine removed at 0920 by nursing students with clinical instructor at bedside.         Electronically signed by Jayla Lea RN on 5/22/2021 at 10:32 AM

## 2021-05-22 NOTE — SIGNIFICANT EVENT
Patient had a witnessed fall in her room. Patient stood at the bedside with writer at her side and walker. Patient stood and had a hold on her walker and began to drift to her right and lost her balance. Writer grabbed a hold of her left arm and slowly lowered her to the ground. Writer called for help and patient was lowered to a flat position on the ground and Marita Ball (with Dr. Aysha Hsu) was notified of the above events by fellow MEENU Osman. Licha Allen stated that as long as the patient was able to move all extremities that nursing staff could get the patient back into bed and that someone would come up to evaluate the patient. She did complain of some pain in her left hip at a 10/10 at the time of the fall but then reported pain at a 3/10 after about 5 minutes. Patient did have her Aspen neck brace on at the time of the fall. House supervisor Chelle Rojo RN was notified also and assisted with getting the patient back in bed using an air mattress. Red star sign placed outside patients room.       Electronically signed by Renu Alston RN on 5/22/2021 at 6:40 PM

## 2021-05-22 NOTE — PLAN OF CARE
infection  Description: Will remain free from infection  5/22/2021 1650 by Era Rodríguez RN  Outcome: Ongoing  5/22/2021 0328 by Patsy Vera RN  Outcome: Ongoing  Goal: Ability to maintain vital signs within normal range will improve  Description: Ability to maintain vital signs within normal range will improve  5/22/2021 1650 by Era Rodríguez RN  Outcome: Ongoing  5/22/2021 0328 by Patsy Vera RN  Outcome: Ongoing     Problem: Respiratory:  Goal: Ability to maintain normal respiratory secretions will improve  Description: Ability to maintain normal respiratory secretions will improve  5/22/2021 1650 by Era Rodríguez RN  Outcome: Ongoing  5/22/2021 0328 by Patsy Vera RN  Outcome: Ongoing     Problem: Skin Integrity:  Goal: Demonstration of wound healing without infection will improve  Description: Demonstration of wound healing without infection will improve  5/22/2021 1650 by Era Rodríguez RN  Outcome: Ongoing  5/22/2021 0328 by Patsy Vera RN  Outcome: Ongoing  Goal: Complications related to intravenous access or infusion will be avoided or minimized  Description: Complications related to intravenous access or infusion will be avoided or minimized  5/22/2021 1650 by Era Rodríguez RN  Outcome: Ongoing  5/22/2021 0328 by Patsy Vera RN  Outcome: Ongoing     Problem: Musculor/Skeletal Functional Status  Goal: Highest potential functional level  Outcome: Ongoing  Goal: Absence of falls  Outcome: Met This Shift

## 2021-05-22 NOTE — PROGRESS NOTES
New orders for bilateral hip xrays placed by Dr. Gerard Florez. Still no bedside evaluation by physician.       Electronically signed by Wyatt Kaur RN on 5/22/2021 at 7:13 PM

## 2021-05-22 NOTE — PROGRESS NOTES
Physical Therapy    Facility/Department: [address] ORTHO/MED SURG  Initial Assessment    NAME: Ijeoma Meyer  : 1950  MRN: [id number]    Date of Service: 2021  Halbert Soulier is a 80 y.o. female who presents for PAT appointment. Patient complains of \"stiff neck, can't turn and can't hold up real good especially at the end of the day\". She reports this has been an issue since her back surgery one year ago (Dr. Raul Gonzalez) and that symptoms have progressively worsened. Patient has been scheduled for  C2-6 LAMINECTOMY, FUSION. \"  Discharge Recommendations:    Further therapy recommended at discharge. PT Equipment Recommendations  Equipment Needed: No (pt currently unsafe to perform functional mobility unassisted)    Assessment   Body structures, Functions, Activity limitations: Decreased functional mobility ; Decreased posture;Decreased endurance;Decreased ROM; Decreased strength;Decreased balance;Decreased safe awareness; Increased pain  Assessment: Pt ambulated 4ft w/ RW Jennifer, requiring modAx2 to perform bed mobility at this time. Pt demonstrates ataxia and LE weakness limiting pt's tolerance to functional mobility. At this time, pt is a high fall risk and is currently unsafe to perform functional mobility unassisted. Recommending continued skilled physical therapy to address functional mobility deficits to return pt to prior level of independence. Prognosis: Fair  Decision Making: Medium Complexity  PT Education: PT Role;Goals;Plan of Care  REQUIRES PT FOLLOW UP: Yes  Activity Tolerance  Activity Tolerance: Patient Tolerated treatment well       Patient Diagnosis(es): There were no encounter diagnoses.      has a past medical history of Allergic rhinitis, cause unspecified, Back pain, Bowel obstruction (HCC), C. difficile diarrhea, CAD (coronary artery disease), Cardiac murmur, Cellulitis, Cellulitis, Cerebral artery occlusion with cerebral infarction ([facility] [address] COVID-19, Diverticulosis of colon (without mention of hemorrhage), GERD (gastroesophageal reflux disease), GERD (gastroesophageal reflux disease), History of blood transfusion, History of CHF (congestive heart failure), History of MI (myocardial infarction), History of ovarian cyst, History of peritonitis, HTN (hypertension), Hx of blood clots, Hyperlipidemia, Intestinal or peritoneal adhesions with obstruction (postoperative) (postinfection) ([address] Kidney infection, Lateral epicondylitis  of elbow, MDRO (multiple drug resistant organisms) resistance, Muscle strain, Other abnormal glucose, PONV (postoperative nausea and vomiting), Pre-diabetes, Restless legs syndrome (RLS), Snores, 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[address] TIA (transient ischemic attack), Uses walker, Vitamin D deficiency, Wears glasses, and Wellness examination. has a past surgical history that includes Ovary removal (1970); colectomy (6306); Appendectomy (1968); Ovary removal (1971); Hysterectomy (1973); Bunionectomy (Left); sinus surgery (2004); Colonoscopy; other surgical history (08/14/2014); cyst removal (Right); Wrist fracture surgery (Left, 03/05/2019); Upper gastrointestinal endoscopy (N/A, 05/31/2019); Upper gastrointestinal endoscopy (N/A, 08/05/2019); Upper gastrointestinal endoscopy (N/A, 08/23/2019); Abdomen surgery (1976); lumbar fusion (N/A, 02/10/2020); Cardiac catheterization; Cardiac catheterization (2005); [place] tooth extraction; lumbar fusion (N/A, 06/17/2020); back surgery; and cervical fusion (05/21/2021).     Restrictions  Restrictions/Precautions  Restrictions/Precautions: Fall Risk  Required Braces or Orthoses?: Yes  Required Braces or Orthoses  Cervical: c-collar (aspen collar when out of bed- ok to remove when eating and sleeping)  Position Activity Restriction  Other position/activity restrictions: Amb pt.; act as tolerated; S/p POSTERIOR C3-6 LAMINECTOMY, PARTIAL C7 LAMINECTOMY, FUSION C3-C6 ( 5/21/21)  Vision/Hearing  Vision: Impaired  Vision Exceptions: Wears glasses at all times  Hearing: Within functional limits     Subjective  General  Patient assessed for rehabilitation services?: Yes  Response To Previous Treatment: Not applicable  Family / Caregiver Present: No  Follows Commands: Within Functional Limits  Subjective  Subjective: RN and pt in agreement for PT eval; pt supine in bed upon PT arrival, c-collar donned upon arrival, pt very pleasant and cooperative throughout session  Pain Screening  Patient Currently in Pain: Yes  Pain Assessment  Pain Assessment: 0-10  Pain Level: 8  Pain Type: Acute pain  Pain Location: Head;Neck  Pain Descriptors: Discomfort  Non-Pharmaceutical Pain Intervention(s): Ambulation/Increased Activity;Repositioned  Response to Pain Intervention: Patient Satisfied  Multiple Pain Sites: No  Vital Signs  Patient Currently in Pain: Yes       Orientation  Orientation  Overall Orientation Status: Within Functional Limits  Social/Functional History  Social/Functional History  Lives With: Spouse (able to assist prn, pt reports spouse had \"triple bypass in [place]")  Type of Home: House  Home Layout: One level, Laundry in basement  Home Access: Stairs to enter without rails  Entrance Stairs - Number of Steps: 1  Entrance Stairs - Rails: None (pt requires assist from  for stair negotiation)  Bathroom Shower/Tub: Tub/Shower unit  Bathroom Toilet: Handicap height  Bathroom Equipment: Grab bars in shower, Shower chair, Grab bars around toilet  Home Equipment: 4 wheeled walker, Cane, Rolling walker, Hospital bed  ADL Assistance: Independent  Homemaking Assistance: Needs assistance (pt reports performing cooking, cleaning tasks,  perform laundry)  Homemaking Responsibilities: Yes  Ambulation Assistance: Independent (pt independently ambulates with RW)  Transfer Assistance: Independent  Active : No  Mode of Transportation: Family  Occupation: Retired  Type of occupation:   Additional Comments: Daughter assists 2x/week for medication management and assistance for household tasks  Cognition   Cognition  Overall Cognitive Status: Exceptions  Insights: Decreased awareness of deficits    Objective  Joint Mobility  Spine: WFL  ROM RLE: WFL  ROM LLE: hip WFL; Knee flexion to 90 degrees, extension WFL; ankle WFL  ROM RUE: WFL  ROM LUE: WFL  Strength RLE  Strength RLE: WFL  Strength LLE  Strength LLE: WFL  Strength RUE  Strength RUE: Exception  Comment: Did not formally assess UE strength due to c-spine precautions  Strength LUE  Strength LUE: Exception  Comment: Did not formally assess UE strength due to c-spine precautions  Motor Control  Gross Motor?: WFL  Sensation  Overall Sensation Status: WFL  Bed mobility  Supine to Sit: Moderate assistance;2 Person assistance  Sit to Supine: Moderate assistance  Scooting: Moderate assistance  Comment: Pt required verbal cueing for hand placement and sequencing with good return demo, assist provided for LE progression and trunk support to achieve full sitting position  Transfers  Sit to Stand: Minimal Assistance;2 Person Assistance  Stand to sit: Minimal Assistance;2 Person Assistance  Comment: Sit to stand performed with RW  Ambulation  Ambulation?: Yes  Ambulation 1  Surface: level tile  Device: Rolling Walker  Assistance: Minimal assistance; Moderate assistance  Quality of Gait: Decreased step length bilaterally; RLE buckling with weight acceptance; significant LLE extension throughout; gross ataxia  Gait Deviations: Slow Alicia;Staggers  Distance: 4ft to [facility]  Comments: Pt demonstrates one LOB noted requiring modA from writer to correct LOB.   Stairs/Curb  Stairs?: No (pt unsafe to attempt at this time)     Balance  Posture: Fair  Sitting - Static: Good;-  Sitting - Dynamic: Fair;+  Standing - Static: Fair;+  Standing - Dynamic: Fair;-  Comments: standing balance assessed w/ RW; pt able to sit EOB CGA once established        Plan   Plan  Times per week: 6-7x/week  Current Treatment Recommendations: Strengthening, Transfer Training, Endurance Training, Patient/Caregiver Education & Training, ROM, Equipment Evaluation, Education, & procurement, Balance Training, Gait Training, Home Exercise Program, Functional Mobility Training, Stair training, Safety Education & Training  Safety Devices  Type of devices: Patient at risk for falls, Left in bed, Bed alarm in place, Call light within reach, Gait belt, Nurse notified  Restraints  Initially in place: No  AM-PAC Score     AM-PAC Inpatient Mobility without Stair Climbing Raw Score : 12 (05/22/21 1338)  AM-PAC Inpatient without Stair Climbing T-Scale Score : 37.26 (05/22/21 1338)  Mobility Inpatient CMS 0-100% Score: 63.03 (05/22/21 1338)  Mobility Inpatient without Stair CMS G-Code Modifier : CL (05/22/21 1338)       Goals  Short term goals  Time Frame for Short term goals: 14  Short term goal 1: Pt to perform bed mobility CGA  Short term goal 2: Pt to demonstrate functional transfers SBA  Short term goal 3: Ambulate 100ft w/ RW SBA  Short term goal 4: Demonstrate standing dynamic balance of good - with AD to decrease fall risk with functional mobility  Patient Goals   Patient goals :  To get stronger       Therapy Time   Individual Concurrent Group Co-treatment   Time In 0907         Time Out 0950         Minutes 43         Timed Code Treatment Minutes: 8 Minutes       Jessica Membreno, PT

## 2021-05-22 NOTE — PLAN OF CARE
Problem: Falls - Risk of:  Goal: Will remain free from falls  Description: Will remain free from falls  5/22/2021 0328 by Kimberly Bettencourt RN  Outcome: Met This Shift  5/22/2021 0328 by Kimberly Bettencourt RN  Outcome: Ongoing  Goal: Absence of physical injury  Description: Absence of physical injury  5/22/2021 0328 by Kimberly Bettencourt RN  Outcome: Met This Shift  5/22/2021 0328 by Kimberly Bettencourt RN  Outcome: Ongoing     Problem: Pain:  Goal: Pain level will decrease  Description: Pain level will decrease  5/22/2021 0328 by Kimberly Bettencourt RN  Outcome: Ongoing  5/22/2021 0328 by Kimberly Bettencourt RN  Outcome: Ongoing  5/22/2021 0328 by Kimberly Bettencourt RN  Outcome: Ongoing  5/21/2021 1812 by Hugh Rebolledo RN  Outcome: Ongoing  Goal: Control of acute pain  Description: Control of acute pain  5/22/2021 0328 by Kimberly Bettencourt RN  Outcome: Ongoing  5/22/2021 0328 by Kimberly Bettencourt RN  Outcome: Ongoing  5/22/2021 0328 by Kimberly Bettencourt RN  Outcome: Ongoing  5/21/2021 1812 by Hugh Rebolledo RN  Outcome: Ongoing  Goal: Control of chronic pain  Description: Control of chronic pain  5/22/2021 0328 by Kimberly Bettencourt RN  Outcome: Ongoing  5/22/2021 0328 by Kimberly Bettencourt RN  Outcome: Ongoing  5/22/2021 0328 by Kimberly Bettencourt RN  Outcome: Ongoing  5/21/2021 1812 by Hugh Rebolledo RN  Outcome: Ongoing     Problem: Nutritional:  Goal: Nutritional status will improve  Description: Nutritional status will improve  Outcome: Ongoing     Problem: Physical Regulation:  Goal: Diagnostic test results will improve  Description: Diagnostic test results will improve  Outcome: Ongoing  Goal: Will remain free from infection  Description: Will remain free from infection  Outcome: Ongoing  Goal: Ability to maintain vital signs within normal range will improve  Description: Ability to maintain vital signs within normal range will improve  Outcome: Ongoing     Problem: Respiratory:  Goal: Ability to maintain normal respiratory secretions will improve  Description: Ability to maintain normal respiratory secretions will improve  Outcome: Ongoing     Problem: Skin Integrity:  Goal: Demonstration of wound healing without infection will improve  Description: Demonstration of wound healing without infection will improve  Outcome: Ongoing  Goal: Complications related to intravenous access or infusion will be avoided or minimized  Description: Complications related to intravenous access or infusion will be avoided or minimized  Outcome: Ongoing

## 2021-05-23 PROCEDURE — 6360000002 HC RX W HCPCS: Performed by: PHYSICIAN ASSISTANT

## 2021-05-23 PROCEDURE — 6360000002 HC RX W HCPCS: Performed by: NURSE PRACTITIONER

## 2021-05-23 PROCEDURE — 2580000003 HC RX 258: Performed by: NURSE PRACTITIONER

## 2021-05-23 PROCEDURE — APPSS30 APP SPLIT SHARED TIME 16-30 MINUTES: Performed by: PHYSICIAN ASSISTANT

## 2021-05-23 PROCEDURE — 1200000000 HC SEMI PRIVATE

## 2021-05-23 PROCEDURE — 6370000000 HC RX 637 (ALT 250 FOR IP): Performed by: NURSE PRACTITIONER

## 2021-05-23 RX ADMIN — ACETAMINOPHEN 650 MG: 325 TABLET ORAL at 18:30

## 2021-05-23 RX ADMIN — OXYCODONE HYDROCHLORIDE 10 MG: 5 TABLET ORAL at 03:46

## 2021-05-23 RX ADMIN — OXYCODONE HYDROCHLORIDE 10 MG: 5 TABLET ORAL at 10:37

## 2021-05-23 RX ADMIN — Medication 1 TABLET: at 10:41

## 2021-05-23 RX ADMIN — ACETAMINOPHEN 650 MG: 325 TABLET ORAL at 23:51

## 2021-05-23 RX ADMIN — FERROUS SULFATE TAB EC 325 MG (65 MG FE EQUIVALENT) 325 MG: 325 (65 FE) TABLET DELAYED RESPONSE at 21:05

## 2021-05-23 RX ADMIN — SODIUM CHLORIDE, PRESERVATIVE FREE 10 ML: 5 INJECTION INTRAVENOUS at 21:06

## 2021-05-23 RX ADMIN — OXYCODONE HYDROCHLORIDE 10 MG: 5 TABLET ORAL at 17:03

## 2021-05-23 RX ADMIN — CITALOPRAM 20 MG: 20 TABLET, FILM COATED ORAL at 10:42

## 2021-05-23 RX ADMIN — LISINOPRIL 30 MG: 20 TABLET ORAL at 10:42

## 2021-05-23 RX ADMIN — METHOCARBAMOL TABLETS 750 MG: 750 TABLET, COATED ORAL at 12:33

## 2021-05-23 RX ADMIN — FENOFIBRATE 134 MG: 54 TABLET ORAL at 06:41

## 2021-05-23 RX ADMIN — ACETAMINOPHEN 650 MG: 325 TABLET ORAL at 06:42

## 2021-05-23 RX ADMIN — FERROUS SULFATE TAB EC 325 MG (65 MG FE EQUIVALENT) 325 MG: 325 (65 FE) TABLET DELAYED RESPONSE at 10:42

## 2021-05-23 RX ADMIN — AMLODIPINE BESYLATE 5 MG: 5 TABLET ORAL at 10:42

## 2021-05-23 RX ADMIN — OXYCODONE HYDROCHLORIDE 10 MG: 5 TABLET ORAL at 21:05

## 2021-05-23 RX ADMIN — METHOCARBAMOL TABLETS 750 MG: 750 TABLET, COATED ORAL at 10:41

## 2021-05-23 RX ADMIN — MORPHINE SULFATE 2 MG: 2 INJECTION, SOLUTION INTRAMUSCULAR; INTRAVENOUS at 23:51

## 2021-05-23 RX ADMIN — PANTOPRAZOLE SODIUM 40 MG: 40 TABLET, DELAYED RELEASE ORAL at 17:03

## 2021-05-23 RX ADMIN — CARVEDILOL 12.5 MG: 12.5 TABLET, FILM COATED ORAL at 21:05

## 2021-05-23 RX ADMIN — ALOGLIPTIN 12.5 MG: 12.5 TABLET, FILM COATED ORAL at 10:41

## 2021-05-23 RX ADMIN — SODIUM CHLORIDE: 9 INJECTION, SOLUTION INTRAVENOUS at 10:37

## 2021-05-23 RX ADMIN — Medication 1000 MCG: at 10:42

## 2021-05-23 RX ADMIN — ENOXAPARIN SODIUM 40 MG: 40 INJECTION, SOLUTION INTRAVENOUS; SUBCUTANEOUS at 10:42

## 2021-05-23 RX ADMIN — ACETAMINOPHEN 650 MG: 325 TABLET ORAL at 12:33

## 2021-05-23 RX ADMIN — Medication 1 TABLET: at 21:05

## 2021-05-23 RX ADMIN — CARVEDILOL 12.5 MG: 12.5 TABLET, FILM COATED ORAL at 10:42

## 2021-05-23 RX ADMIN — METHOCARBAMOL TABLETS 750 MG: 750 TABLET, COATED ORAL at 17:03

## 2021-05-23 RX ADMIN — DESMOPRESSIN ACETATE 40 MG: 0.2 TABLET ORAL at 21:05

## 2021-05-23 RX ADMIN — METHOCARBAMOL TABLETS 750 MG: 750 TABLET, COATED ORAL at 21:05

## 2021-05-23 RX ADMIN — PANTOPRAZOLE SODIUM 40 MG: 40 TABLET, DELAYED RELEASE ORAL at 06:42

## 2021-05-23 ASSESSMENT — PAIN DESCRIPTION - LOCATION: LOCATION: HIP

## 2021-05-23 ASSESSMENT — PAIN SCALES - GENERAL
PAINLEVEL_OUTOF10: 8
PAINLEVEL_OUTOF10: 5
PAINLEVEL_OUTOF10: 8
PAINLEVEL_OUTOF10: 8
PAINLEVEL_OUTOF10: 6
PAINLEVEL_OUTOF10: 8
PAINLEVEL_OUTOF10: 8
PAINLEVEL_OUTOF10: 7
PAINLEVEL_OUTOF10: 5
PAINLEVEL_OUTOF10: 6
PAINLEVEL_OUTOF10: 5
PAINLEVEL_OUTOF10: 6

## 2021-05-23 ASSESSMENT — PAIN DESCRIPTION - ORIENTATION: ORIENTATION: LEFT

## 2021-05-23 ASSESSMENT — PAIN DESCRIPTION - PAIN TYPE: TYPE: ACUTE PAIN

## 2021-05-23 NOTE — PROGRESS NOTES
Neurosurgery TRICIA/Resident    Daily Progress Note   No chief complaint on file. 5/23/2021  8:14 AM    Chart reviewed. Patient fell when walking to bathroom with nurse. She hit her hip on side table, but did not hit her head or neck. She complains of some new hip pain, other wise post op pain is controlled. She is tolerating PO and urinating without difficulty. Vitals:    05/22/21 1756 05/22/21 1955 05/22/21 2330 05/23/21 0345   BP: (!) 131/51 (!) 125/49 (!) 178/61 (!) 142/63   Pulse: 67 63 74 61   Resp: 18 16 16 16   Temp: 97.9 °F (36.6 °C) 98 °F (36.7 °C) 98.4 °F (36.9 °C) 98.3 °F (36.8 °C)   TempSrc: Oral Oral Oral Oral   SpO2: 95% 98% 95% 97%   Weight:       Height:           PE:   AOx3   Motor   L deltoid 5/5; R deltoid 5/5  L biceps 5/5; R biceps 5/5  L triceps 5/5; R triceps 5/5  L wrist extension 5/5; R wrist extension 5/5  L intrinsics 5/5; R intrinsics 5/5      L iliopsoas 5/5 , R iliopsoas 5/5  L quadriceps 5/5; R quadriceps 5/5  L Dorsiflexion 5/5; R dorsiflexion 5/5  L Plantarflexion 5/5; R plantarflexion 5/5  L EHL 5/5; R EHL 5/5    Sensation intact     Drain output 70ml/12hr  Incision c/d/i      Lab Results   Component Value Date    WBC 15.1 (H) 05/22/2021    HGB 10.4 (L) 05/22/2021    HCT 33.1 (L) 05/22/2021     05/22/2021    CHOL 103 05/20/2019    TRIG 66 05/20/2019    HDL 74 03/12/2021    ALT 17 01/27/2020    AST 22 01/27/2020     05/07/2021    K 4.2 05/07/2021     05/07/2021    CREATININE 1.28 (H) 05/21/2021    BUN 64 (H) 05/07/2021    CO2 22 05/07/2021    TSH 1.43 05/20/2019    INR 1.0 04/08/2020    LABA1C 5.2 02/22/2021    LABMICR 11 08/05/2015    CRP 0.5 01/25/2018    SEDRATE 12 08/25/2019       Radiology   XR CERVICAL SPINE (2-3 VIEWS)    Result Date: 5/22/2021  EXAMINATION: XRAY VIEWS OF THE CERVICAL SPINE 5/22/2021 10:15 am COMPARISON: None.  HISTORY: ORDERING SYSTEM PROVIDED HISTORY: Cervical laminectomy and fusion, AP and lateral upright TECHNOLOGIST PROVIDED HISTORY: Cervical laminectomy and fusion, AP and lateral upright FINDINGS: The cervical spine is visualized to the level of C6-7. The patient is status post posterior instrumented fusion of C3-C6, bilateral laminectomies at C3, C4, C5 and C6. There is straightening of normal cervical lordosis. Vertebral body heights are grossly maintained, without fracture or destructive osseous lesion. There is degenerative disc disease with disc space narrowing, endplate changes and endplate osteophyte formation, most severe at C5-6. The soft tissue is within normal limits. No radiopaque foreign body. There is mild pulmonary vascular congestion at the lung apices. Status post posterior instrumented fusion of C3-C6, bilateral laminectomies at C3, C4, C5 and C6. Straightening of normal cervical lordosis. No fracture or destructive osseous lesion. Degenerative disc disease. Mild pulmonary vascular congestion at the lung apices. CT cervical spine without contrast    Result Date: 5/22/2021  EXAMINATION: CT OF THE CERVICAL SPINE WITHOUT CONTRAST 5/22/2021 8:19 am TECHNIQUE: CT of the cervical spine was performed without the administration of intravenous contrast. Multiplanar reformatted images are provided for review. Dose modulation, iterative reconstruction, and/or weight based adjustment of the mA/kV was utilized to reduce the radiation dose to as low as reasonably achievable. COMPARISON: MRI 03/12/2021, CT 02/08/2021. HISTORY: ORDERING SYSTEM PROVIDED HISTORY: cervical laminectomy and fusion TECHNOLOGIST PROVIDED HISTORY: cervical laminectomy and fusion POSTERIOR C3-6 LAMINECTOMY, PARTIAL C7 LAMINECTOMY FUSION C3-C6 Segmental lateral mass fixation C3-C6 Use of morselized autograft and allograft. FINDINGS: BONES/ALIGNMENT: The craniocervical junction is intact.   Postsurgical changes status post interval C3-C6 posterior decompression with posterior spinal fusion including dual vertical rods and paired transpedicle screws. The hardware is intact. Minimal grade 1 anterolisthesis of C3 on C4 measures 1-2 mm. No additional spondylolisthesis. Cervical vertebral bodies are normal in height. No acute cervical spine fracture. DEGENERATIVE CHANGES: Moderate degenerative disc disease at C5-C6 with disc space narrowing and endplate osteophyte formation. Mild degenerative disc disease throughout the remainder of the cervical spine with mild disc height loss. SOFT TISSUES: Surgical drain in the paraspinal soft tissues. Postoperative soft tissue gas and fluid. No focal fluid collection. 1. Status post interval C3-C6 posterior spinal fusion and decompression. The hardware is intact. Postoperative soft tissue gas and fluid in the paraspinal soft tissues. No focal fluid collection. 2. Minimal anterolisthesis of C3 on C4 measures 1-2 mm. 3. Moderate C5-C6 degenerative disc disease. Mild degenerative disc disease elsewhere throughout the cervical spine. XR HIP W PELVIS MIN 5 VWS BILATERAL    Result Date: 5/22/2021  EXAMINATION: ONE XRAY VIEW OF THE PELVIS AND TWO XRAY VIEWS OF EACH OF THE BILATERAL HIPS 5/22/2021 4:41 pm COMPARISON: None. HISTORY: ORDERING SYSTEM PROVIDED HISTORY: fell out of bed TECHNOLOGIST PROVIDED HISTORY: fell out of bed Reason for Exam: fall Acuity: Unknown FINDINGS: Postsurgical changes in the lumbosacral junction and overlying the mid pelvis. .  There is no evidence of fracture or dislocation. The  joint spaces appear well maintained. The soft tissues are unremarkable.      No acute bony abnormalities are noted       A/P  80 y.o. female who presents with cervical myelopathy  POD 2 s/p C3-6 posterior laminectomy and fusion                    - Aspen collar to be worn when out of bed               - Encourage mobilization               - JULIO drain discontinued due to low output, patient tolerated removal without complication  - Lovenox and SCDs for dvt ppx  - Discharge planning to rehab  - Encourage IS      Please contact neurosurgery with any changes in patients neurologic status.        KRISHNA Coleman   8:14 AM EDT

## 2021-05-23 NOTE — PLAN OF CARE
Problem: Pain:  Goal: Pain level will decrease  Description: Pain level will decrease  Outcome: Ongoing  Goal: Control of acute pain  Description: Control of acute pain  Outcome: Ongoing  Goal: Control of chronic pain  Description: Control of chronic pain  Outcome: Ongoing     Problem: Falls - Risk of:  Goal: Will remain free from falls  Description: Will remain free from falls  Outcome: Ongoing  Goal: Absence of physical injury  Description: Absence of physical injury  Outcome: Ongoing     Problem: Nutritional:  Goal: Nutritional status will improve  Description: Nutritional status will improve  Outcome: Ongoing     Problem: Physical Regulation:  Goal: Diagnostic test results will improve  Description: Diagnostic test results will improve  Outcome: Ongoing  Goal: Will remain free from infection  Description: Will remain free from infection  Outcome: Ongoing  Goal: Ability to maintain vital signs within normal range will improve  Description: Ability to maintain vital signs within normal range will improve  Outcome: Ongoing     Problem: Respiratory:  Goal: Ability to maintain normal respiratory secretions will improve  Description: Ability to maintain normal respiratory secretions will improve  Outcome: Ongoing     Problem: Skin Integrity:  Goal: Demonstration of wound healing without infection will improve  Description: Demonstration of wound healing without infection will improve  Outcome: Ongoing  Goal: Complications related to intravenous access or infusion will be avoided or minimized  Description: Complications related to intravenous access or infusion will be avoided or minimized  Outcome: Ongoing     Problem: Musculor/Skeletal Functional Status  Goal: Highest potential functional level  Outcome: Ongoing  Goal: Absence of falls  Outcome: Ongoing

## 2021-05-23 NOTE — PLAN OF CARE
Problem: Falls - Risk of:  Goal: Will remain free from falls  Description: Will remain free from falls  5/23/2021 0203 by Jackie Vazquez RN  Outcome: Met This Shift  5/22/2021 1846 by Tani Golden RN  Outcome: Not Met This Shift  5/22/2021 1650 by Tani Golden RN  Outcome: Met This Shift  Goal: Absence of physical injury  Description: Absence of physical injury  5/23/2021 0203 by Jackie Vazquez RN  Outcome: Met This Shift  5/22/2021 1846 by Tani Golden RN  Outcome: Not Met This Shift  5/22/2021 1650 by Tani Golden RN  Outcome: Met This Shift     Problem: Pain:  Goal: Pain level will decrease  Description: Pain level will decrease  5/23/2021 0203 by Jackie Vazquez RN  Outcome: Ongoing  5/22/2021 1650 by Tani Golden RN  Outcome: Ongoing  Goal: Control of acute pain  Description: Control of acute pain  5/23/2021 0203 by Jackie Vazquez RN  Outcome: Ongoing  5/22/2021 1650 by Tani Golden RN  Outcome: Ongoing  Goal: Control of chronic pain  Description: Control of chronic pain  5/23/2021 0203 by Jackie Vazquez RN  Outcome: Ongoing  5/22/2021 1650 by Tani Golden RN  Outcome: Ongoing     Problem: Nutritional:  Goal: Nutritional status will improve  Description: Nutritional status will improve  5/23/2021 0203 by Jackie Vazuqez RN  Outcome: Ongoing  5/22/2021 1650 by Tani Golden RN  Outcome: Ongoing     Problem: Physical Regulation:  Goal: Diagnostic test results will improve  Description: Diagnostic test results will improve  5/23/2021 0203 by Jackie Vazquez RN  Outcome: Ongoing  5/22/2021 1650 by Tani Golden RN  Outcome: Ongoing  Goal: Will remain free from infection  Description: Will remain free from infection  5/23/2021 0203 by Jackie Vazquez RN  Outcome: Ongoing  5/22/2021 1650 by Tani Golden RN  Outcome: Ongoing  Goal: Ability to maintain vital signs within normal range will improve  Description: Ability to maintain vital signs within normal range will improve  5/23/2021 0203 by Armani Olea RN  Outcome: Ongoing  5/22/2021 1650 by Anupam Marino RN  Outcome: Ongoing     Problem: Respiratory:  Goal: Ability to maintain normal respiratory secretions will improve  Description: Ability to maintain normal respiratory secretions will improve  5/23/2021 0203 by Armani Olea RN  Outcome: Ongoing  5/22/2021 1650 by Anupam Marino RN  Outcome: Ongoing     Problem: Skin Integrity:  Goal: Demonstration of wound healing without infection will improve  Description: Demonstration of wound healing without infection will improve  5/23/2021 0203 by Armani Olea RN  Outcome: Ongoing  5/22/2021 1650 by Anupam Marino RN  Outcome: Ongoing  Goal: Complications related to intravenous access or infusion will be avoided or minimized  Description: Complications related to intravenous access or infusion will be avoided or minimized  5/23/2021 0203 by Armani Olea RN  Outcome: Ongoing  5/22/2021 1650 by Anupam Marino RN  Outcome: Ongoing     Problem: Musculor/Skeletal Functional Status  Goal: Highest potential functional level  5/23/2021 0203 by Armani Olea RN  Outcome: Ongoing  5/22/2021 1650 by Anupam Marino RN  Outcome: Ongoing  Goal: Absence of falls  5/23/2021 0203 by Armani Olea RN  Outcome: Ongoing  5/22/2021 1650 by Anupam Marino RN  Outcome: Met This Shift

## 2021-05-23 NOTE — PROGRESS NOTES
Occupational Therapy  Facility/Department: [address] ORTHO/MED SURG  Daily Treatment Note  NAME: Segundo Kovacs  : 1950  MRN: [id number]    Date of Service: 2021    Discharge Recommendations:  Patient would benefit from continued therapy after discharge. Pt. Would benefit from further skilled OT services prior to d/c home to improve functional outcomes. OT Equipment Recommendations  Equipment Needed: Yes  Other: Per pt. she has all equipment needs at home. Assessment   Performance deficits / Impairments: Decreased functional mobility ; Decreased ADL status; Decreased endurance;Decreased high-level IADLs;Decreased strength;Decreased coordination;Decreased balance  Assessment: pt would be unsafe to return to prior living arrangement at this time due to required heavy skilled assistance for functional transfers/mobility. pt would benefit from further therapy at discharge in order to increase safety and independence with ADLs and functional mobility. Treatment Diagnosis: cervical fusion  Prognosis: Good  Decision Making: Medium Complexity  OT Education: Plan of Care;OT Role;Transfer Training;Precautions  Patient Education: pt ed on POc, purpose of tx, importance of movement, safety during functional transfers, hand placement during transfers. good return  REQUIRES OT FOLLOW UP: Yes  Activity Tolerance  Activity Tolerance: Patient Tolerated treatment well  Safety Devices  Safety Devices in place: Yes  Type of devices: Gait belt;Call light within reach; Patient at risk for falls; Left in chair;Nurse notified; Chair alarm in place  Restraints  Initially in place: No         Patient Diagnosis(es): There were no encounter diagnoses.       has a past medical history of Allergic rhinitis, cause unspecified, Back pain, Bowel obstruction (HCC), C. difficile diarrhea, CAD (coronary artery disease), Cardiac murmur, Cellulitis, Cellulitis, Cerebral artery occlusion with cerebral infarction ([facility] [address] COVID-19, Diverticulosis of colon (without mention of hemorrhage), GERD (gastroesophageal reflux disease), GERD (gastroesophageal reflux disease), History of blood transfusion, History of CHF (congestive heart failure), History of MI (myocardial infarction), History of ovarian cyst, History of peritonitis, HTN (hypertension), Hx of blood clots, Hyperlipidemia, Intestinal or peritoneal adhesions with obstruction (postoperative) (postinfection) ([address] Kidney infection, Lateral epicondylitis  of elbow, MDRO (multiple drug resistant organisms) resistance, Muscle strain, Other abnormal glucose, PONV (postoperative nausea and vomiting), Pre-diabetes, Restless legs syndrome (RLS), Snores, 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[address] TIA (transient ischemic attack), Uses walker, Vitamin D deficiency, Wears glasses, and Wellness examination. has a past surgical history that includes Ovary removal (1970); colectomy (0988); Appendectomy (1968); Ovary removal (1971); Hysterectomy (1973); Bunionectomy (Left); sinus surgery (2004); Colonoscopy; other surgical history (08/14/2014); cyst removal (Right); Wrist fracture surgery (Left, 03/05/2019); Upper gastrointestinal endoscopy (N/A, 05/31/2019); Upper gastrointestinal endoscopy (N/A, 08/05/2019); Upper gastrointestinal endoscopy (N/A, 08/23/2019); Abdomen surgery (1976); lumbar fusion (N/A, 02/10/2020); Cardiac catheterization; Cardiac catheterization (2005); Gordon tooth extraction; lumbar fusion (N/A, 06/17/2020); back surgery; and cervical fusion (05/21/2021).     Restrictions  Restrictions/Precautions  Restrictions/Precautions: Fall Risk  Required Braces or Orthoses?: Yes  Required Braces or Orthoses  Cervical: c-collar (Except while eating, drinking or sleeping.)  Position Activity Restriction  Other position/activity restrictions: activity as tolerated, C3-C6 fusion 5/21  Subjective   General  Patient assessed for rehabilitation services?: Yes  Family / Caregiver Present: No  Diagnosis: cervical assistance;2 Person assistance  Sit to stand: Minimal assistance;2 Person assistance  Stand to sit: Minimal assistance;2 Person assistance  Transfer Comments: with RW. Min A x 1-2 persons + mod cues for B hand placement and tech. EOB->stand->recliner->stand->recliner. Pt. retired to [address] chair. Cognition  Overall Cognitive Status: Exceptions  Arousal/Alertness: Appropriate responses to stimuli  Following Commands:  Follows one step commands consistently  Safety Judgement: Decreased awareness of need for assistance  Insights: Decreased awareness of deficits  Initiation: Does not require cues  Sequencing: Does not require cues                                         Plan   Plan  Times per week: 3-5x/wk          Goals  Short term goals  Time Frame for Short term goals: pt will, by discharge  Short term goal 1: complete LB ADLs and toileting tasks with min A, set up and Ae, as needed  Short term goal 2: complete UB ADLs and grooming tasks with mod I and set up  Short term goal 3: increase activity tolerance to 25+ minutes in order to participate in daily tasks  Short term goal 4: dem CGA during functional transfers/functional mobility with LRD and no buckling/LOB  Short term goal 5: dem ~5 minutes static/dynamic standing balance with CGA and LRD in order to complete functional tasks  Short term goal 6: participate in ~10 minutes meaningful activity in order to increase L UE coordination       Therapy Time   Individual Concurrent Group Co-treatment   Time In 1230         Time Out 1308         Minutes 38         Timed Code Treatment Minutes: [address], JONI/L

## 2021-05-23 NOTE — PLAN OF CARE
Problem: Pain:  Goal: Pain level will decrease  Description: Pain level will decrease  5/23/2021 1834 by Gleen Alpers, RN  Outcome: Ongoing  5/23/2021 1832 by Gleen Alpers, RN  Outcome: Ongoing  Goal: Control of acute pain  Description: Control of acute pain  5/23/2021 1834 by Gleen Alpers, RN  Outcome: Ongoing  5/23/2021 1832 by Gleen Alpers, RN  Outcome: Ongoing  Goal: Control of chronic pain  Description: Control of chronic pain  5/23/2021 1834 by Gleen Alpers, RN  Outcome: Ongoing  5/23/2021 1832 by Gleen Alpers, RN  Outcome: Ongoing     Problem: Falls - Risk of:  Goal: Will remain free from falls  Description: Will remain free from falls  5/23/2021 1834 by Gleen Alpers, RN  Outcome: Ongoing  5/23/2021 1832 by Gleen Alpers, RN  Outcome: Ongoing  Goal: Absence of physical injury  Description: Absence of physical injury  5/23/2021 1834 by Gleen Alpers, RN  Outcome: Ongoing  5/23/2021 1832 by Gleen Alpers, RN  Outcome: Ongoing     Problem: Nutritional:  Goal: Nutritional status will improve  Description: Nutritional status will improve  5/23/2021 1834 by Gleen Alpers, RN  Outcome: Ongoing  5/23/2021 1832 by Gleen Alpers, RN  Outcome: Ongoing     Problem: Physical Regulation:  Goal: Diagnostic test results will improve  Description: Diagnostic test results will improve  5/23/2021 1834 by Gleen Alpers, RN  Outcome: Ongoing  5/23/2021 1832 by Gleen Alpers, RN  Outcome: Ongoing  Goal: Will remain free from infection  Description: Will remain free from infection  5/23/2021 1834 by Gleen Alpers, RN  Outcome: Ongoing  5/23/2021 1832 by Gleen Alpers, RN  Outcome: Ongoing  Goal: Ability to maintain vital signs within normal range will improve  Description: Ability to maintain vital signs within normal range will improve  5/23/2021 1834 by Gleen Alpers, RN  Outcome: Ongoing  5/23/2021 1832 by Gleen Alpers, RN  Outcome: Ongoing     Problem: Respiratory:  Goal: Ability to maintain normal respiratory

## 2021-05-23 NOTE — PROGRESS NOTES
Patient BP is 125/49. Patinet is asymptomatic. Also, patient is complaining of left hip pain. Left foot is cooler compared to right foot and patient has strong pedal pulse in right foot but have to use doppler to find pedal pulse in left foot. Notified Dr. Blondell Cheadle via [employer]. Will continue to monitor.

## 2021-05-24 LAB — GLUCOSE BLD-MCNC: 114 MG/DL (ref 65–105)

## 2021-05-24 PROCEDURE — 1200000000 HC SEMI PRIVATE

## 2021-05-24 PROCEDURE — 97530 THERAPEUTIC ACTIVITIES: CPT

## 2021-05-24 PROCEDURE — 82947 ASSAY GLUCOSE BLOOD QUANT: CPT

## 2021-05-24 PROCEDURE — APPSS15 APP SPLIT SHARED TIME 0-15 MINUTES: Performed by: NURSE PRACTITIONER

## 2021-05-24 PROCEDURE — 6360000002 HC RX W HCPCS: Performed by: PHYSICIAN ASSISTANT

## 2021-05-24 PROCEDURE — 2580000003 HC RX 258: Performed by: NURSE PRACTITIONER

## 2021-05-24 PROCEDURE — 6370000000 HC RX 637 (ALT 250 FOR IP): Performed by: NURSE PRACTITIONER

## 2021-05-24 PROCEDURE — 99232 SBSQ HOSP IP/OBS MODERATE 35: CPT | Performed by: STUDENT IN AN ORGANIZED HEALTH CARE EDUCATION/TRAINING PROGRAM

## 2021-05-24 PROCEDURE — 97535 SELF CARE MNGMENT TRAINING: CPT

## 2021-05-24 RX ADMIN — OXYCODONE HYDROCHLORIDE 10 MG: 5 TABLET ORAL at 18:12

## 2021-05-24 RX ADMIN — PANTOPRAZOLE SODIUM 40 MG: 40 TABLET, DELAYED RELEASE ORAL at 16:53

## 2021-05-24 RX ADMIN — METHOCARBAMOL TABLETS 750 MG: 750 TABLET, COATED ORAL at 16:53

## 2021-05-24 RX ADMIN — FENOFIBRATE 134 MG: 54 TABLET ORAL at 06:17

## 2021-05-24 RX ADMIN — FERROUS SULFATE TAB EC 325 MG (65 MG FE EQUIVALENT) 325 MG: 325 (65 FE) TABLET DELAYED RESPONSE at 20:32

## 2021-05-24 RX ADMIN — OXYCODONE HYDROCHLORIDE 10 MG: 5 TABLET ORAL at 22:31

## 2021-05-24 RX ADMIN — AMLODIPINE BESYLATE 5 MG: 5 TABLET ORAL at 09:08

## 2021-05-24 RX ADMIN — OXYCODONE HYDROCHLORIDE 10 MG: 5 TABLET ORAL at 14:20

## 2021-05-24 RX ADMIN — ACETAMINOPHEN 650 MG: 325 TABLET ORAL at 06:16

## 2021-05-24 RX ADMIN — OXYCODONE HYDROCHLORIDE 10 MG: 5 TABLET ORAL at 06:16

## 2021-05-24 RX ADMIN — PANTOPRAZOLE SODIUM 40 MG: 40 TABLET, DELAYED RELEASE ORAL at 06:17

## 2021-05-24 RX ADMIN — LISINOPRIL 30 MG: 20 TABLET ORAL at 09:08

## 2021-05-24 RX ADMIN — ALOGLIPTIN 12.5 MG: 12.5 TABLET, FILM COATED ORAL at 09:08

## 2021-05-24 RX ADMIN — ACETAMINOPHEN 650 MG: 325 TABLET ORAL at 18:13

## 2021-05-24 RX ADMIN — FERROUS SULFATE TAB EC 325 MG (65 MG FE EQUIVALENT) 325 MG: 325 (65 FE) TABLET DELAYED RESPONSE at 09:08

## 2021-05-24 RX ADMIN — CITALOPRAM 20 MG: 20 TABLET, FILM COATED ORAL at 09:08

## 2021-05-24 RX ADMIN — ACETAMINOPHEN 650 MG: 325 TABLET ORAL at 12:30

## 2021-05-24 RX ADMIN — Medication 1 TABLET: at 20:32

## 2021-05-24 RX ADMIN — METHOCARBAMOL TABLETS 750 MG: 750 TABLET, COATED ORAL at 20:32

## 2021-05-24 RX ADMIN — SODIUM CHLORIDE, PRESERVATIVE FREE 10 ML: 5 INJECTION INTRAVENOUS at 09:08

## 2021-05-24 RX ADMIN — Medication 1 TABLET: at 09:08

## 2021-05-24 RX ADMIN — OXYCODONE HYDROCHLORIDE 10 MG: 5 TABLET ORAL at 10:04

## 2021-05-24 RX ADMIN — MORPHINE SULFATE 2 MG: 2 INJECTION, SOLUTION INTRAMUSCULAR; INTRAVENOUS at 09:08

## 2021-05-24 RX ADMIN — OXYCODONE HYDROCHLORIDE 10 MG: 5 TABLET ORAL at 02:07

## 2021-05-24 RX ADMIN — CARVEDILOL 12.5 MG: 12.5 TABLET, FILM COATED ORAL at 20:32

## 2021-05-24 RX ADMIN — Medication 1000 MCG: at 09:07

## 2021-05-24 RX ADMIN — DESMOPRESSIN ACETATE 40 MG: 0.2 TABLET ORAL at 20:32

## 2021-05-24 RX ADMIN — METHOCARBAMOL TABLETS 750 MG: 750 TABLET, COATED ORAL at 09:08

## 2021-05-24 RX ADMIN — METHOCARBAMOL TABLETS 750 MG: 750 TABLET, COATED ORAL at 12:30

## 2021-05-24 RX ADMIN — CARVEDILOL 12.5 MG: 12.5 TABLET, FILM COATED ORAL at 09:08

## 2021-05-24 RX ADMIN — ENOXAPARIN SODIUM 40 MG: 40 INJECTION, SOLUTION INTRAVENOUS; SUBCUTANEOUS at 09:07

## 2021-05-24 ASSESSMENT — ENCOUNTER SYMPTOMS: CONSTIPATION: 1

## 2021-05-24 ASSESSMENT — PAIN DESCRIPTION - LOCATION: LOCATION: NECK

## 2021-05-24 ASSESSMENT — PAIN SCALES - GENERAL
PAINLEVEL_OUTOF10: 7
PAINLEVEL_OUTOF10: 5
PAINLEVEL_OUTOF10: 5
PAINLEVEL_OUTOF10: 4
PAINLEVEL_OUTOF10: 5
PAINLEVEL_OUTOF10: 10
PAINLEVEL_OUTOF10: 2
PAINLEVEL_OUTOF10: 7
PAINLEVEL_OUTOF10: 8
PAINLEVEL_OUTOF10: 8
PAINLEVEL_OUTOF10: 2
PAINLEVEL_OUTOF10: 2
PAINLEVEL_OUTOF10: 5
PAINLEVEL_OUTOF10: 3
PAINLEVEL_OUTOF10: 8
PAINLEVEL_OUTOF10: 4

## 2021-05-24 NOTE — PROGRESS NOTES
Occupational Therapy  Facility/Department: [address] ORTHO/MED SURG  Daily Treatment Note  NAME: Ron Pop  : 1950  MRN: [id number]    Date of Service: 2021    Discharge Recommendations:  Patient would benefit from continued therapy after discharge   Ed on OT services, ADLs, orientation review, EC/WS, DME/AE, sx soap prec, c-collar prec, walker safety/technique-good return       Assessment   Performance deficits / Impairments: Decreased functional mobility ; Decreased ADL status; Decreased ROM; Decreased strength;Decreased endurance;Decreased balance;Decreased high-level IADLs;Decreased posture  Treatment Diagnosis: cervical fusion  Prognosis: Good  REQUIRES OT FOLLOW UP: Yes  Activity Tolerance  Activity Tolerance: Patient Tolerated treatment well  Safety Devices  Safety Devices in place: Yes  Type of devices: All fall risk precautions in place; Bed alarm in place;Call light within reach; Left in chair;Nurse notified         Patient Diagnosis(es): There were no encounter diagnoses.       has a past medical history of Allergic rhinitis, cause unspecified, Back pain, Bowel obstruction (HCC), C. difficile diarrhea, CAD (coronary artery disease), Cardiac murmur, Cellulitis, Cellulitis, Cerebral artery occlusion with cerebral infarction ([address] COVID-19, Diverticulosis of colon (without mention of hemorrhage), GERD (gastroesophageal reflux disease), GERD (gastroesophageal reflux disease), History of blood transfusion, History of CHF (congestive heart failure), History of MI (myocardial infarction), History of ovarian cyst, History of peritonitis, HTN (hypertension), Hx of blood clots, Hyperlipidemia, Intestinal or peritoneal adhesions with obstruction (postoperative) (postinfection) ([address] Kidney infection, Lateral epicondylitis  of elbow, MDRO (multiple drug resistant organisms) resistance, Muscle strain, Other abnormal glucose, PONV (postoperative nausea and vomiting), Pre-diabetes, Restless legs syndrome (RLS), Snores, 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 ischemic attack), Uses walker, Vitamin D deficiency, Wears glasses, and Wellness examination. has a past surgical history that includes Ovary removal (1970); colectomy (6385); Appendectomy (1968); Ovary removal (1971); Hysterectomy (1973); Bunionectomy (Left); sinus surgery (2004); Colonoscopy; other surgical history (08/14/2014); cyst removal (Right); Wrist fracture surgery (Left, 03/05/2019); Upper gastrointestinal endoscopy (N/A, 05/31/2019); Upper gastrointestinal endoscopy (N/A, 08/05/2019); Upper gastrointestinal endoscopy (N/A, 08/23/2019); Abdomen surgery (1976); lumbar fusion (N/A, 02/10/2020); Cardiac catheterization; Cardiac catheterization (2005); [place] tooth extraction; lumbar fusion (N/A, 06/17/2020); back surgery; and cervical fusion (05/21/2021). Restrictions  Restrictions/Precautions  Restrictions/Precautions: Fall Risk  Required Braces or Orthoses?: Yes  Required Braces or Orthoses  Cervical: c-collar  Position Activity Restriction  Other position/activity restrictions: activity as tolerated, C3-C6 fusion 5/21  Subjective   General  Patient assessed for rehabilitation services?: Yes  Family / Caregiver Present: No  Diagnosis: cervical fusion    Vital Signs  Patient Currently in Pain: No (pt had been given pain meds prior to writer's arrival)   Orientation  Orientation  Overall Orientation Status: Within Functional Limits  Objective    Pt in chair upon arrival awake and alert. Pt pleasant and motivated. Pt receptive to ed. Pt required more assistance w/ transfers d/t decreased strength and balance. Pt required more assistance w/ self care d/t decreased strength and endurance. Pt retired to recliner at end of session. ADL  Grooming: Setup;Contact guard assistance; Increased time to complete (dynamic standing at the sink w/ RW)  UE Bathing: Setup; Increased time to complete;Stand by assistance (seated on toilet)  LE Bathing: Setup;Stand by assistance; Increased time to complete (seated on toilet for B LE, CGA for backside/caty care)  UE Dressing: Setup;Minimal assistance  LE Dressing: Maximum assistance  Toileting: Setup;Minimal assistance; Moderate assistance  Additional Comments: pt completed ADLs and used sx soap for UB/LB bathing. pt had c-collar on during the session. Balance  Sitting Balance: Stand by assistance  Standing Balance: Contact guard assistance /min A w/ RW)    Standing Balance  Time: stood ~ 8-10 min  Activity: dynamic standing for simple grooming, dynamic standing for caty care/backside mgt, bathroom <>recliner transfer   Comment: pt used RW for support/safety, pt's L LE demo decreased speed/coordination noted which pt reports pt has been dealing with for some time. Toilet Transfers  Toilet - Technique: Ambulating  Equipment Used: Raised toilet seat with rails  Toilet Transfer: Moderate assistance     Transfers  Stand Step Transfers: Contact guard assistance (/ min A w/RW)  Sit to stand: Moderate assistance  Stand to sit: Moderate assistance  Transfer Comments: verbal/tactile cues for sequencing transfers- good return.   Attendance  Participation: Active participation            Plan   Plan  Times per week: 3-5x/wk       Goals  Short term goals  Time Frame for Short term goals: pt will, by discharge  Short term goal 1: complete LB ADLs and toileting tasks with min A, set up and Ae, as needed  Short term goal 2: complete UB ADLs and grooming tasks with mod I and set up  Short term goal 3: increase activity tolerance to 25+ minutes in order to participate in daily tasks  Short term goal 4: dem CGA during functional transfers/functional mobility with LRD and no buckling/LOB  Short term goal 5: dem ~5 minutes static/dynamic standing balance with CGA and LRD in order to complete functional tasks  Short term goal 6: participate in ~10 minutes meaningful activity in order to increase L UE coordination

## 2021-05-24 NOTE — PROGRESS NOTES
Neurosurgery [facility]/Resident    Daily Progress Note   CC:No chief complaint on file. 5/24/2021  11:13 AM    Chart reviewed. No acute events overnight. No new complaints. Afebrile, resting in bed, tolerating diet this morning. Reports she is passing flatus.   Reports that her pain has improved significantly since yesterday    Vitals:    05/23/21 0345 05/23/21 0800 05/23/21 0822 05/24/21 0614   BP: (!) 142/63  (!) 118/45 (!) 178/78   Pulse: 61 61 68 81   Resp: 16  17 18   Temp: 98.3 °F (36.8 °C)  98.2 °F (36.8 °C) 97.6 °F (36.4 °C)   TempSrc: Oral  Oral Oral   SpO2: 97%  98% 98%   Weight:       Height:           PE:   AOx3   PERRL, EOMI  Motor   L deltoid 4+/5; R deltoid 5/5 (due to pain from her fall 5/22)  L biceps 5/5; R biceps 5/5  L triceps 5/5; R triceps 5/5       L iliopsoas 5/5 , R iliopsoas 5/5  L quadriceps 5/5; R quadriceps 5/5  L Dorsiflexion 5/5; R dorsiflexion 5/5  L Plantarflexion 5/5; R plantarflexion 5/5  L EHL 5/5; R EHL 5/5    Sensation: intact     Incision: intact no redness or erythema noted      Lab Results   Component Value Date    WBC 15.1 (H) 05/22/2021    HGB 10.4 (L) 05/22/2021    HCT 33.1 (L) 05/22/2021     05/22/2021    CHOL 103 05/20/2019    TRIG 66 05/20/2019    HDL 74 03/12/2021    ALT 17 01/27/2020    AST 22 01/27/2020     05/07/2021    K 4.2 05/07/2021     05/07/2021    CREATININE 1.28 (H) 05/21/2021    BUN 64 (H) 05/07/2021    CO2 22 05/07/2021    TSH 1.43 05/20/2019    INR 1.0 04/08/2020    LABA1C 5.2 02/22/2021    LABMICR 11 08/05/2015    CRP 0.5 01/25/2018         A/P  80 y.o. female who presents with cervical myelopathy  POD #3 s/p C3-6 posterior laminectomy and fusion    Aspen collar to be worn while out of bed, okay to remove while eating, drinking and sleeping  Encourage incentive spirometer  PT and OT for evaluation  Will discontinue her IV morphine  Lovenox for DVT prophylaxis  Continue Roxicodone for pain management  Discharge [facility] [address] rehab versus [facility]        Please contact neurosurgery with any changes in patients neurologic status.        Chun Conti CNP  5/24/21  11:13 AM

## 2021-05-24 NOTE — PLAN OF CARE
Problem: Pain:  Goal: Pain level will decrease  Description: Pain level will decrease  5/24/2021 1804 by Adrien Brady RN  Outcome: Ongoing  5/24/2021 0747 by Dotty Card RN  Outcome: Ongoing  Goal: Control of acute pain  Description: Control of acute pain  5/24/2021 1804 by Adrien Brady RN  Outcome: Ongoing  5/24/2021 0747 by Dotty Card RN  Outcome: Ongoing  Goal: Control of chronic pain  Description: Control of chronic pain  5/24/2021 1804 by Adrien Brady RN  Outcome: Ongoing  5/24/2021 0747 by Dotty Card RN  Outcome: Ongoing     Problem: Falls - Risk of:  Goal: Will remain free from falls  Description: Will remain free from falls  5/24/2021 1804 by Adrien Brady RN  Outcome: Ongoing  5/24/2021 0747 by Dotty Card RN  Outcome: Met This Shift  Goal: Absence of physical injury  Description: Absence of physical injury  5/24/2021 1804 by Adrien Brady RN  Outcome: Ongoing  5/24/2021 0747 by Dotty Card RN  Outcome: Met This Shift     Problem: Nutritional:  Goal: Nutritional status will improve  Description: Nutritional status will improve  5/24/2021 1804 by Adrien Brady RN  Outcome: Ongoing  5/24/2021 0747 by Dotty Card RN  Outcome: Ongoing     Problem: Physical Regulation:  Goal: Diagnostic test results will improve  Description: Diagnostic test results will improve  5/24/2021 1804 by Adrien Brady RN  Outcome: Ongoing  5/24/2021 0747 by Dotty Card RN  Outcome: Ongoing  Goal: Will remain free from infection  Description: Will remain free from infection  5/24/2021 1804 by Adrien Brady RN  Outcome: Ongoing  5/24/2021 0747 by Dotty Card RN  Outcome: Ongoing  Goal: Ability to maintain vital signs within normal range will improve  Description: Ability to maintain vital signs within normal range will improve  5/24/2021 1804 by Adrien Brady RN  Outcome: Ongoing  5/24/2021 0747 by Dotty Card RN  Outcome: Ongoing Problem: Respiratory:  Goal: Ability to maintain normal respiratory secretions will improve  Description: Ability to maintain normal respiratory secretions will improve  5/24/2021 1804 by Jhoan Felton RN  Outcome: Ongoing  5/24/2021 0747 by Frank Parker RN  Outcome: Ongoing     Problem: Skin Integrity:  Goal: Demonstration of wound healing without infection will improve  Description: Demonstration of wound healing without infection will improve  5/24/2021 1804 by Jhoan Felton RN  Outcome: Ongoing  5/24/2021 0747 by Frank Parker RN  Outcome: Ongoing  Goal: Complications related to intravenous access or infusion will be avoided or minimized  Description: Complications related to intravenous access or infusion will be avoided or minimized  5/24/2021 1804 by Jhoan Felton RN  Outcome: Ongoing  5/24/2021 0747 by Frank Parker RN  Outcome: Ongoing  Goal: Will show no infection signs and symptoms  Description: Will show no infection signs and symptoms  5/24/2021 1804 by Jhoan Felton RN  Outcome: Ongoing  5/24/2021 0747 by Frank Parker RN  Outcome: Ongoing  Goal: Absence of new skin breakdown  Description: Absence of new skin breakdown  5/24/2021 1804 by Jhoan Felton RN  Outcome: Ongoing  5/24/2021 0747 by Frank Parker RN  Outcome: Ongoing     Problem: Musculor/Skeletal Functional Status  Goal: Highest potential functional level  5/24/2021 1804 by Jhoan Felton RN  Outcome: Ongoing  5/24/2021 0747 by Frank Parker RN  Outcome: Ongoing  Goal: Absence of falls  5/24/2021 1804 by Jhoan Felton RN  Outcome: Ongoing  5/24/2021 0747 by Frank Parker RN  Outcome: Ongoing

## 2021-05-24 NOTE — CARE COORDINATION
Transition planning  Call from [facility] at [place], she states they are able to accept patient and start precert if patient decides to go to SNF instead of [facility]. 56 Dr. Edna Daley here to see patient, states patient is appropriate for ARU. 36 Spoke with Vanessa at [facility], they will start precert once PT/OT notes and Dr. Cynthia Zazueta notes are in the chart.

## 2021-05-24 NOTE — PLAN OF CARE
Problem: Pain:  Goal: Pain level will decrease  Description: Pain level will decrease  5/24/2021 0747 by Anna Zuñiga RN  Outcome: Ongoing  5/23/2021 1834 by Beatriz Mcnair RN  Outcome: Ongoing  5/23/2021 1832 by Beatriz Mcnair RN  Outcome: Ongoing  Goal: Control of acute pain  Description: Control of acute pain  5/24/2021 0747 by Anna Zuñiga RN  Outcome: Ongoing  5/23/2021 1834 by Beatriz Mcnair RN  Outcome: Ongoing  5/23/2021 1832 by Beatriz Mcnair RN  Outcome: Ongoing  Goal: Control of chronic pain  Description: Control of chronic pain  5/24/2021 0747 by Anna Zuñiga RN  Outcome: Ongoing  5/23/2021 1834 by Beatriz Mcnair RN  Outcome: Ongoing  5/23/2021 1832 by Beatriz Mcnair RN  Outcome: Ongoing     Problem: Falls - Risk of:  Goal: Will remain free from falls  Description: Will remain free from falls  5/24/2021 0747 by Anna Zuñiga RN  Outcome: Met This Shift  5/23/2021 1834 by Beatriz Mcnair RN  Outcome: Ongoing  5/23/2021 1832 by Beatriz Mcnair RN  Outcome: Ongoing  Goal: Absence of physical injury  Description: Absence of physical injury  5/24/2021 0747 by Anna Zuñiga RN  Outcome: Met This Shift  5/23/2021 1834 by Beatriz Mcnair RN  Outcome: Ongoing  5/23/2021 1832 by Beatriz Mcnair RN  Outcome: Ongoing     Problem: Skin Integrity:  Goal: Demonstration of wound healing without infection will improve  Description: Demonstration of wound healing without infection will improve  5/24/2021 0747 by Anna Zuñiga RN  Outcome: Ongoing  5/23/2021 1834 by Beatriz Mcnair RN  Outcome: Ongoing  5/23/2021 1832 by Beatriz Mcnair RN  Outcome: Ongoing  Goal: Complications related to intravenous access or infusion will be avoided or minimized  Description: Complications related to intravenous access or infusion will be avoided or minimized  5/24/2021 0747 by Anna Zuñiga RN  Outcome: Ongoing  5/23/2021 1834 by Beatriz Mcnair RN  Outcome: Ongoing  5/23/2021 1832 by Beatriz Mcnair

## 2021-05-25 PROCEDURE — 97535 SELF CARE MNGMENT TRAINING: CPT

## 2021-05-25 PROCEDURE — 97530 THERAPEUTIC ACTIVITIES: CPT

## 2021-05-25 PROCEDURE — 97110 THERAPEUTIC EXERCISES: CPT

## 2021-05-25 PROCEDURE — APPSS15 APP SPLIT SHARED TIME 0-15 MINUTES: Performed by: NURSE PRACTITIONER

## 2021-05-25 PROCEDURE — 97116 GAIT TRAINING THERAPY: CPT

## 2021-05-25 PROCEDURE — 2580000003 HC RX 258: Performed by: NURSE PRACTITIONER

## 2021-05-25 PROCEDURE — 1200000000 HC SEMI PRIVATE

## 2021-05-25 PROCEDURE — 6360000002 HC RX W HCPCS: Performed by: PHYSICIAN ASSISTANT

## 2021-05-25 PROCEDURE — 6370000000 HC RX 637 (ALT 250 FOR IP): Performed by: NURSE PRACTITIONER

## 2021-05-25 RX ADMIN — ENOXAPARIN SODIUM 40 MG: 40 INJECTION, SOLUTION INTRAVENOUS; SUBCUTANEOUS at 08:34

## 2021-05-25 RX ADMIN — METHOCARBAMOL TABLETS 750 MG: 750 TABLET, COATED ORAL at 08:34

## 2021-05-25 RX ADMIN — OXYCODONE HYDROCHLORIDE 10 MG: 5 TABLET ORAL at 05:52

## 2021-05-25 RX ADMIN — METHOCARBAMOL TABLETS 750 MG: 750 TABLET, COATED ORAL at 16:51

## 2021-05-25 RX ADMIN — OXYCODONE HYDROCHLORIDE 10 MG: 5 TABLET ORAL at 02:20

## 2021-05-25 RX ADMIN — Medication 1 TABLET: at 08:34

## 2021-05-25 RX ADMIN — ALOGLIPTIN 12.5 MG: 12.5 TABLET, FILM COATED ORAL at 08:34

## 2021-05-25 RX ADMIN — ACETAMINOPHEN 650 MG: 325 TABLET ORAL at 12:54

## 2021-05-25 RX ADMIN — ACETAMINOPHEN 650 MG: 325 TABLET ORAL at 18:31

## 2021-05-25 RX ADMIN — CARVEDILOL 12.5 MG: 12.5 TABLET, FILM COATED ORAL at 21:02

## 2021-05-25 RX ADMIN — OXYCODONE HYDROCHLORIDE 10 MG: 5 TABLET ORAL at 18:30

## 2021-05-25 RX ADMIN — OXYCODONE HYDROCHLORIDE 10 MG: 5 TABLET ORAL at 23:14

## 2021-05-25 RX ADMIN — CITALOPRAM 20 MG: 20 TABLET, FILM COATED ORAL at 08:34

## 2021-05-25 RX ADMIN — Medication 1000 MCG: at 08:34

## 2021-05-25 RX ADMIN — SODIUM CHLORIDE, PRESERVATIVE FREE 10 ML: 5 INJECTION INTRAVENOUS at 08:35

## 2021-05-25 RX ADMIN — ACETAMINOPHEN 650 MG: 325 TABLET ORAL at 23:14

## 2021-05-25 RX ADMIN — PANTOPRAZOLE SODIUM 40 MG: 40 TABLET, DELAYED RELEASE ORAL at 16:51

## 2021-05-25 RX ADMIN — METHOCARBAMOL TABLETS 750 MG: 750 TABLET, COATED ORAL at 12:54

## 2021-05-25 RX ADMIN — Medication 1 TABLET: at 21:02

## 2021-05-25 RX ADMIN — METHOCARBAMOL TABLETS 750 MG: 750 TABLET, COATED ORAL at 21:02

## 2021-05-25 RX ADMIN — FENOFIBRATE 134 MG: 54 TABLET ORAL at 05:51

## 2021-05-25 RX ADMIN — FERROUS SULFATE TAB EC 325 MG (65 MG FE EQUIVALENT) 325 MG: 325 (65 FE) TABLET DELAYED RESPONSE at 08:37

## 2021-05-25 RX ADMIN — POLYETHYLENE GLYCOL 3350 17 G: 17 POWDER, FOR SOLUTION ORAL at 08:34

## 2021-05-25 RX ADMIN — AMLODIPINE BESYLATE 5 MG: 5 TABLET ORAL at 08:34

## 2021-05-25 RX ADMIN — LISINOPRIL 30 MG: 20 TABLET ORAL at 08:34

## 2021-05-25 RX ADMIN — DESMOPRESSIN ACETATE 40 MG: 0.2 TABLET ORAL at 21:02

## 2021-05-25 RX ADMIN — ACETAMINOPHEN 650 MG: 325 TABLET ORAL at 02:20

## 2021-05-25 RX ADMIN — ACETAMINOPHEN 650 MG: 325 TABLET ORAL at 05:52

## 2021-05-25 RX ADMIN — OXYCODONE HYDROCHLORIDE 10 MG: 5 TABLET ORAL at 09:55

## 2021-05-25 RX ADMIN — FERROUS SULFATE TAB EC 325 MG (65 MG FE EQUIVALENT) 325 MG: 325 (65 FE) TABLET DELAYED RESPONSE at 21:02

## 2021-05-25 RX ADMIN — CARVEDILOL 12.5 MG: 12.5 TABLET, FILM COATED ORAL at 08:34

## 2021-05-25 RX ADMIN — PANTOPRAZOLE SODIUM 40 MG: 40 TABLET, DELAYED RELEASE ORAL at 05:51

## 2021-05-25 RX ADMIN — OXYCODONE HYDROCHLORIDE 10 MG: 5 TABLET ORAL at 14:15

## 2021-05-25 ASSESSMENT — PAIN SCALES - GENERAL
PAINLEVEL_OUTOF10: 7
PAINLEVEL_OUTOF10: 8
PAINLEVEL_OUTOF10: 2
PAINLEVEL_OUTOF10: 7
PAINLEVEL_OUTOF10: 10
PAINLEVEL_OUTOF10: 5
PAINLEVEL_OUTOF10: 8
PAINLEVEL_OUTOF10: 5

## 2021-05-25 ASSESSMENT — PAIN DESCRIPTION - DESCRIPTORS: DESCRIPTORS: ACHING

## 2021-05-25 ASSESSMENT — PAIN DESCRIPTION - FREQUENCY: FREQUENCY: CONTINUOUS

## 2021-05-25 ASSESSMENT — PAIN DESCRIPTION - PAIN TYPE
TYPE: ACUTE PAIN
TYPE: ACUTE PAIN;SURGICAL PAIN

## 2021-05-25 ASSESSMENT — PAIN DESCRIPTION - LOCATION: LOCATION: NECK

## 2021-05-25 ASSESSMENT — PAIN DESCRIPTION - ORIENTATION: ORIENTATION: LEFT;RIGHT

## 2021-05-25 NOTE — PLAN OF CARE
Problem: Pain:  Goal: Pain level will decrease  Description: Pain level will decrease  Outcome: Ongoing  Goal: Control of acute pain  Description: Control of acute pain  Outcome: Ongoing  Goal: Control of chronic pain  Description: Control of chronic pain  Outcome: Ongoing     Problem: Falls - Risk of:  Goal: Will remain free from falls  Description: Will remain free from falls  Outcome: Ongoing  Goal: Absence of physical injury  Description: Absence of physical injury  Outcome: Ongoing     Problem: Nutritional:  Goal: Nutritional status will improve  Description: Nutritional status will improve  Outcome: Ongoing     Problem: Physical Regulation:  Goal: Diagnostic test results will improve  Description: Diagnostic test results will improve  Outcome: Ongoing  Goal: Will remain free from infection  Description: Will remain free from infection  Outcome: Ongoing  Goal: Ability to maintain vital signs within normal range will improve  Description: Ability to maintain vital signs within normal range will improve  Outcome: Ongoing     Problem: Respiratory:  Goal: Ability to maintain normal respiratory secretions will improve  Description: Ability to maintain normal respiratory secretions will improve  Outcome: Ongoing     Problem: Skin Integrity:  Goal: Demonstration of wound healing without infection will improve  Description: Demonstration of wound healing without infection will improve  Outcome: Ongoing  Goal: Complications related to intravenous access or infusion will be avoided or minimized  Description: Complications related to intravenous access or infusion will be avoided or minimized  Outcome: Ongoing  Goal: Will show no infection signs and symptoms  Description: Will show no infection signs and symptoms  Outcome: Ongoing  Goal: Absence of new skin breakdown  Description: Absence of new skin breakdown  Outcome: Ongoing     Problem: Musculor/Skeletal Functional Status  Goal: Highest potential functional level  Outcome: Ongoing  Goal: Absence of falls  Outcome: Ongoing

## 2021-05-25 NOTE — PROGRESS NOTES
Physical Therapy  Facility/Department: [address] ORTHO/MED SURG  Daily Treatment Note  NAME: Keila Ang  : 1950  MRN: [id number]    Date of Service: 2021    Discharge Recommendations:  Patient would benefit from continued therapy after discharge   PT Equipment Recommendations  Other: Has her own wheeled walker here    Assessment   Body structures, Functions, Activity limitations: Decreased functional mobility ; Decreased posture;Decreased endurance;Decreased ROM; Decreased strength;Decreased balance;Decreased safe awareness; Increased pain  Assessment: Abilities are limited by a bradykinesia, poor motor planning, and poor postural control. Needs greater mobility before being safe to mobilize on her own. High fall risk with patient reporting she's already had approximately 6 falls in the last 6 months. PT Education: PT Role;Goals;Plan of Care; Functional Mobility Training;General Safety;Transfer Training;Gait Training  REQUIRES PT FOLLOW UP: Yes  Activity Tolerance  Activity Tolerance: Patient limited by fatigue;Patient limited by endurance     Patient Diagnosis(es): There were no encounter diagnoses.      has a past medical history of Allergic rhinitis, cause unspecified, Back pain, Bowel obstruction (HCC), C. difficile diarrhea, CAD (coronary artery disease), Cardiac murmur, Cellulitis, Cellulitis, Cerebral artery occlusion with cerebral infarction ([address] COVID-19, Diverticulosis of colon (without mention of hemorrhage), GERD (gastroesophageal reflux disease), GERD (gastroesophageal reflux disease), History of blood transfusion, History of CHF (congestive heart failure), History of MI (myocardial infarction), History of ovarian cyst, History of peritonitis, HTN (hypertension), Hx of blood clots, Hyperlipidemia, Intestinal or peritoneal adhesions with obstruction (postoperative) (postinfection) ([address] Kidney infection, Lateral epicondylitis  of elbow, MDRO (multiple drug resistant organisms) resistance, Orientation  Orientation  Overall Orientation Status: Within Normal Limits  Cognition   Cognition  Arousal/Alertness: Appropriate responses to stimuli  Following Commands: Follows one step commands consistently  Safety Judgement: Decreased awareness of need for assistance  Insights: Decreased awareness of deficits  Initiation: Requires cues for some  Sequencing: Requires cues for some  Objective      Transfers  Sit to Stand: Minimal Assistance (Extra time needed to organize her motor planning. Left LE tends to  stay extended out too far to be useful. Patient able to correct this with cues.)  Stand to sit: Minimal Assistance  Ambulation  Ambulation?: Yes  More Ambulation?: Yes  Ambulation 1  Surface: level tile  Device: Rolling Walker  Assistance: Minimal assistance  Quality of Gait: Decreased step length bilaterally. Shuffling, nearly festinating gait. Poor positioning between her body and the walker. Left foot positioned outside the walker during a turn and occasionally needs cues to catch the left foot up to the walker and right foot. Distance: 39'  Comments: Poor positioning, walker manuevering also in the bathroom, needing cues and assistance to approximate the toilet. Ambulation 2  Surface - 2: level tile  Device 2: Rolling Walker  Assistance 2: Minimal assistance  Gait Deviations: Decreased step height;Decreased step length; Increased KAREEM; Slow Alicia;Deviated path;Shuffles  Distance: 15'        Exercises  Heelslides: x10 reps  Knee Long Arc Quad: x10 reps  Ankle Pumps: x10 reps  Comments: Standing hip flexion marches with instructions to move slowly with the full amount of hip flexion. x10 reps. This instruction exaggerates her poor motor planning since she sets the left foot down too far laterally and anterior in the base of support and then doesn't adjust it before trying to lift the right leg up.                      Goals  Short term goals  Time Frame for Short term goals: 15  Short term goal 1: Pt to perform bed mobility CGA  Short term goal 2: Pt to demonstrate functional transfers SBA  Short term goal 3: Ambulate 100ft w/ RW SBA  Short term goal 4: Demonstrate standing dynamic balance of good - with AD to decrease fall risk with functional mobility  Patient Goals   Patient goals :  To get stronger    Plan    Plan  Times per week: 6-7x/week  Current Treatment Recommendations: Strengthening, Transfer Training, Endurance Training, Patient/Caregiver Education & Training, ROM, Equipment Evaluation, Education, & procurement, Balance Training, Gait Training, Home Exercise Program, Functional Mobility Training, Stair training, Safety Education & Training  Safety Devices  Type of devices: Patient at risk for falls, Call light within reach, Gait belt, Nurse notified, Left in chair, Chair alarm in place, All fall risk precautions in place  Restraints  Initially in place: No     Therapy Time   Individual Concurrent Group Co-treatment   Time In 1010         Time Out 1052         Minutes 42         Timed Code Treatment Minutes: Shawn Perez 5, PT

## 2021-05-25 NOTE — DISCHARGE INSTR - COC
Continuity of Care Form    Patient Name: Gaylyn Severe   :  1950  MRN:  [id number]    [facility] date:  2021  Discharge date:  2021    Code Status Order: Full Code   Advance Directives:   Advance Care Flowsheet Documentation       Date/Time Healthcare Directive Type of Healthcare Directive Copy in [address] Agent's Name Healthcare Agent's Phone Number    21 1216  No, patient does not have an advance directive for healthcare treatment -- -- -- -- --            Admitting Physician:  Dena Phillips DO  PCP: Ruth Mohan MD    Discharging Nurse: [facility] Unit/Room#: 0724/0140-66  Discharging Unit Phone Number: [phone]    Emergency Contact:   Extended Emergency Contact Information  Primary Emergency Contact: Carlin Rogers  Address: [address] [place] [address] [address], [address] Phone: [phone]  Mobile Phone: [phone]  Relation: Spouse  Hearing or visual needs: None  Other needs: None  Preferred language: [place]   needed?  No  Secondary Emergency Contact: Marysol Walters  [place] Phone: [phone]  Mobile Phone: [phone]  Relation: Child    Past Surgical History:  Past Surgical History:   Procedure Laterality Date    ABDOMEN SURGERY      benign tumor removed near remaining ovary, 1.5 pounds    APPENDECTOMY  1968    appendix ruptured, developed peritonitis    BACK SURGERY      BUNIONECTOMY Left     along with calcium deposits removed   [address]      negative    CERVICAL FUSION  2021    POSTERIOR C3-6 LAMINECTOMY, PARTIAL C7 LAMINECTOMY, FUSION C3-C6, SILVERCORD    CERVICAL FUSION N/A 2021    POSTERIOR C3-6 LAMINECTOMY, PARTIAL C7 LAMINECTOMY, FUSION C3-C6, SILVERCORD performed by Dena Phillips DO at [address]    12 INCHES REMOVED D/T OBSTRUCTION    COLONOSCOPY      CYST REMOVAL Right     right facial    HYSTERECTOMY  1973    taken as a result of recurring cysts    LUMBAR FUSION N/A 02/10/2020    LUMBAR L4-5 POSTERIOR  DECOMPRESSION INSTRUMENTATION FUSION WCEMENT AUGMENTATION/ performed by Milton Rodríguez MD at [place] [address] N/A 06/17/2020    L5-S1 PLIF L4-L5 REVISION performed by Milton Rodríguez MD at [address] [place] [address]  08/14/2014    FESS    OVARY REMOVAL  1970    UNILATERAL due to cyst    OVARY REMOVAL  1971    partial, due to cyst    SINUS SURGERY  2004    UPPER GASTROINTESTINAL ENDOSCOPY N/A 05/31/2019    EGD ESOPHAGOGASTRODUODENOSCOPY performed by Samantha Hdez MD at [facility] N/A 08/05/2019    EGD BIOPSY performed by Luis Carlos Cox MD at [facility] N/A 08/23/2019    EGD BIOPSY performed by Samantha Hdez MD at [facility] 03/05/2019    WRIST OPEN REDUCTION INTERNAL FIXATION performed by Lay Matthews MD at [address]       Immunization History:   Immunization History   Administered Date(s) Administered    COVID-19, Gonzalez Peter, PF, 30mcg/0.3mL 03/18/2021, 04/08/2021    Influenza, High Dose (Fluzone 65 yrs and older) 11/17/2017    Pneumococcal Conjugate 13-valent (Nolia Abelson) 05/23/2017    Pneumococcal Polysaccharide ([id number]) 05/10/2016       Active Problems:  Patient Active Problem List   Diagnosis Code    Other specified disorders of rotator cuff syndrome of shoulder and allied disorders M75.100    Diverticulosis of large intestine K57.30    Intestinal or peritoneal adhesions with obstruction (postoperative) (postinfection) ([address] K56.50    Restless legs syndrome (RLS) G25.81    GERD (gastroesophageal reflux disease) K21.9    Essential hypertension I10    Mixed hyperlipidemia E78.2    Other abnormal glucose R73.09    Atherosclerosis I70.90    Allergic rhinitis J30.9    Vitamin D deficiency E55.9    Depression F32.9    Degenerative joint disease (DJD) of hip M16.9    Peripheral edema R60.9    Injury of foot, left J94.009N    Facial droop R29.810    CVA (cerebral vascular accident) ([facility] [address] I63.9    Bradycardia R00.1    Ataxia R27.0    Aphasia R47.01    TIA (transient ischemic attack) G45.9    Need for prophylactic vaccination and inoculation against cholera alone Z23    Osteopenia M85.80    Lumbago M54.5    Meralgia paresthetica of right side G57.11    Lumbar degenerative disc disease M51.36    Spondylosis of lumbar region without myelopathy or radiculopathy M47.816    Blood poisoning [id number]    Cellulitis of left lower extremity L03. 80    Encounter for medication monitoring Z51.81    Deep vein thrombosis (DVT) of right lower extremity (HCC) I82.401    Chronic deep vein thrombosis (DVT) of proximal vein of both lower extremities (HCC) I82.5Y3    Chronic diastolic heart failure (HCC) I50.32    Neurogenic claudication due to lumbar spinal stenosis M48.062    Sacroiliitis (HCC) M46.1    Stage 3 chronic kidney disease N18.30    Type 2 diabetes mellitus with circulatory disorder, without long-term current use of insulin (HCC) E11.59    Chronic deep vein thrombosis (DVT) of popliteal vein of right lower extremity (HCC) I82.531    Closed fracture of left wrist S62.102A    B12 deficiency E53.8    Iron deficiency anemia secondary to inadequate dietary iron intake D50.8    Closed head injury S09.90XA    Scalp laceration S01. 01XA    Abnormal finding on imaging R93.89    Other irritable bowel syndrome K58.8    Frequent falls R29.6    Double vision H53.2    Muscle soreness M79.10    Peptic ulcer K27.9    Melena K92.1    PUD (peptic ulcer disease) K27.9    Absolute anemia D64.9    Acute blood loss anemia D62    Acute renal failure (HCC) N17.9    Clostridium difficile infection A49.8    Acquired spondylolisthesis M43.10    Spinal stenosis of lumbar region with neurogenic claudication M48.062    Acute deep vein thrombosis (DVT) of proximal vein of left lower extremity ([facility]) I82.4Y2    Leg swelling M79.89    Back pain M54.9    Neurological disorder G98.8    Closed unstable burst fracture of fifth lumbar vertebra with routine healing S32.052D    Slow transit constipation K59.01    Major depressive disorder, recurrent, moderate ([facility]) F33.1    Acute deep vein thrombosis (DVT) of femoral vein of left lower extremity ([facility]) I82.412    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[facility]) M48.02, G99.2       Isolation/Infection:   Isolation            No Isolation          Patient Infection Status       Infection Onset Added Last Indicated Last Indicated By Review Planned Expiration Resolved Resolved By    None active    Resolved    COVID-19 Rule Out 05/17/21 05/17/21 05/17/21 COVID-19 (Ordered)   05/18/21 Rule-Out Test Resulted    COVID-19 Rule Out 04/12/21 04/13/21 04/12/21 COVID-19 (Ordered)   04/14/21 Rule-Out Test Resulted    COVID-19 04/25/20 04/25/20 04/25/20 COVID-19   06/18/20 More Hall RN    COVID-19 Rule Out 04/25/20 04/25/20 04/25/20 COVID-19 (Ordered)   04/25/20 Rule-Out Test Resulted            Nurse Assessment:  Last Vital Signs: BP (!) 123/46   Pulse 77   Temp 98.5 °F (36.9 °C) (Oral)   Resp 18   Ht 5' 3\" (1.6 m)   Wt 170 lb (77.1 kg)   SpO2 93%   BMI 30.11 kg/m²     Last documented pain score (0-10 scale): Pain Level: 5  Last Weight:   Wt Readings from Last 1 Encounters:   05/21/21 170 lb (77.1 kg)     Mental Status:  oriented and alert    IV Access:- None    Nursing Mobility/ADLs:  Walking   Assisted x 1 with walker, must have Aspen collar on  Transfer  Assisted  Bathing  Assisted  Dressing  Assisted  Toileting  Assisted  Feeding  Independent  Med Admin  Independent  Med Delivery   whole    Wound Care Documentation and Therapy:        Elimination:  Continence:   · Bowel:  Yes  · Bladder: Yes  Urinary Catheter: None   Colostomy/Ileostomy/Ileal Conduit: No       Date of Last BM: 5/30/2021  No intake or output data in the 24 hours ending 05/25/21 1218  No intake/output data recorded. Safety Concerns:   History of Falls (last 30 days) and At Risk for Falls    Impairments/Disabilities:    None    Nutrition Therapy:  Current Nutrition Therapy: - Oral Diet:  Carb Control 5 carbs/meal (2000kcals/day)    Routes of Feeding: Oral  Liquids: No Restrictions  Daily Fluid Restriction: no  Last Modified Barium Swallow with Video (Video Swallowing Test): not done, not ordered        Rehab Therapies: Physical Therapy and Occupational Therapy  Weight Bearing Status/Restrictions: No weight bearing restirctions  Other Medical Equipment (for information only, NOT a DME order):  walker  Other Treatments: needs to wear aspen collar at all times when out of bed.   Okay to leave collar off when in bed, sitting, sleeping and eating                                   Blood sugar checks HS & HS    Patient's personal belongings (please select all that are sent with patient):Glasses, purse, belonging bag    RN SIGNATURE:  Electronically signed by Iker Paulino RN on 5/25/21 at 12:20 PM EDT                                                                       Gerald Alvarado RN 5/30/2021    CASE MANAGEMENT/SOCIAL WORK SECTION    Inpatient Status Date: ***    Readmission Risk Assessment Score:  Readmission Risk              Risk of Unplanned Readmission:  13           Discharging to Facility/ Agency   Name:   [facility] Details  FAX            [address] [place] [address]       Phone: [phone]       Fax: [phone]        ·     Dialysis Facility (if applicable)   · Name:  ·     / signature: Electronically signed by Stanford Larios RN on 5/30/21 at 3:27 PM EDT    PHYSICIAN SECTION    Prognosis: Good    Condition at Discharge: Stable    Rehab Potential (if transferring to Rehab): Good    Recommended Labs or Other Treatments After Discharge: continued PT & OT    Physician

## 2021-05-25 NOTE — PROGRESS NOTES
Neurosurgery TRICIA/Resident    Daily Progress Note   CC:No chief complaint on file. 5/25/2021  6:12 AM    Chart reviewed. No acute events overnight. No new complaints. Doing well this morning sitting up in the chair eating breakfast. Afebrile. Pain well controlled with current regimen.  Denies numbness and tingling to extremities, denies urinary and bowel issues, passing flatus     Vitals:    05/24/21 0614 05/24/21 1125 05/24/21 1533 05/24/21 2030   BP: (!) 178/78 (!) 124/52 (!) 147/52 (!) 144/57   Pulse: 81 74 74 74   Resp: 18  18 18   Temp: 97.6 °F (36.4 °C)  97.9 °F (36.6 °C) 99 °F (37.2 °C)   TempSrc: Oral  Oral Oral   SpO2: 98%      Weight:       Height:           PE:   AOx3   Motor   L deltoid 5/5; R deltoid 5/5  L biceps 5/5; R biceps 5/5  L triceps 5/5; R triceps 5/5      L iliopsoas 5/5 , R iliopsoas 5/5  L quadriceps 5/5; R quadriceps 5/5  L Dorsiflexion 5/5; R dorsiflexion 5/5  L Plantarflexion 5/5; R plantarflexion 5/5  L EHL 5/5; R EHL 5/5    Sensation: intact     Incision: intact no drainage, erythema and edema noted       Lab Results   Component Value Date    WBC 15.1 (H) 05/22/2021    HGB 10.4 (L) 05/22/2021    HCT 33.1 (L) 05/22/2021     05/22/2021    CHOL 103 05/20/2019    TRIG 66 05/20/2019    HDL 74 03/12/2021    ALT 17 01/27/2020    AST 22 01/27/2020     05/07/2021    K 4.2 05/07/2021     05/07/2021    CREATININE 1.28 (H) 05/21/2021    BUN 64 (H) 05/07/2021    CO2 22 05/07/2021    TSH 1.43 05/20/2019    INR 1.0 04/08/2020    LABA1C 5.2 02/22/2021    LABMICR 11 08/05/2015    CRP 0.5 01/25/2018       A/P  80 y.o. female who presents with cervical myelopathy  POD #4 s/p C3-6 posterior laminectomy and fusion    Aspen collar to be worn while out of bed, okay to remove while eating, drinking and sleeping  Encourage incentive spirometer  PT and OT for evaluation  IV morphine discontinued yesterday  Lovenox for DVT prophylaxis  Continue Roxicodone for pain management  Discharge planning [address] pending precet      Please contact neurosurgery with any changes in patients neurologic status.        Camila Quijano, CNP  5/25/21  6:12 AM

## 2021-05-25 NOTE — PLAN OF CARE
Problem: Pain:  Goal: Pain level will decrease  Description: Pain level will decrease  5/24/2021 2318 by Arturo Hernandez RN  Outcome: Ongoing  5/24/2021 1804 by Colette Galvan RN  Outcome: Ongoing  Goal: Control of acute pain  Description: Control of acute pain  5/24/2021 2318 by Arturo Hernandez RN  Outcome: Ongoing  5/24/2021 1804 by Colette Galvan RN  Outcome: Ongoing  Goal: Control of chronic pain  Description: Control of chronic pain  5/24/2021 2318 by Arturo Hernandez RN  Outcome: Ongoing  5/24/2021 1804 by Colette Galvan RN  Outcome: Ongoing     Problem: Falls - Risk of:  Goal: Will remain free from falls  Description: Will remain free from falls  5/24/2021 2318 by Arturo Hernandez RN  Outcome: Ongoing  5/24/2021 1804 by Colette Galvan RN  Outcome: Ongoing  Goal: Absence of physical injury  Description: Absence of physical injury  5/24/2021 2318 by Arturo Hernandez RN  Outcome: Ongoing  5/24/2021 1804 by Colette Galvan RN  Outcome: Ongoing     Problem: Nutritional:  Goal: Nutritional status will improve  Description: Nutritional status will improve  5/24/2021 2318 by Arturo Hernandez RN  Outcome: Ongoing  5/24/2021 1804 by Colette Galvan RN  Outcome: Ongoing     Problem: Physical Regulation:  Goal: Diagnostic test results will improve  Description: Diagnostic test results will improve  5/24/2021 2318 by Arturo Hernandez RN  Outcome: Ongoing  5/24/2021 1804 by Colette Galvan RN  Outcome: Ongoing  Goal: Will remain free from infection  Description: Will remain free from infection  5/24/2021 2318 by Arturo Hernandez RN  Outcome: Ongoing  5/24/2021 1804 by Colette Galvan RN  Outcome: Ongoing  Goal: Ability to maintain vital signs within normal range will improve  Description: Ability to maintain vital signs within normal range will improve  5/24/2021 2318 by Arturo Hernandez RN  Outcome: Ongoing  5/24/2021 1804 by Colette Galvan RN  Outcome: Ongoing     Problem: Respiratory:  Goal: Ability to maintain normal respiratory secretions will improve  Description: Ability to maintain normal respiratory secretions will improve  5/24/2021 2318 by Jesus Alberto Sewell RN  Outcome: Ongoing  5/24/2021 1804 by Carlos Howard RN  Outcome: Ongoing     Problem: Skin Integrity:  Goal: Demonstration of wound healing without infection will improve  Description: Demonstration of wound healing without infection will improve  5/24/2021 2318 by Jesus Alberto Sewell RN  Outcome: Ongoing  5/24/2021 1804 by Carlos Howard RN  Outcome: Ongoing  Goal: Complications related to intravenous access or infusion will be avoided or minimized  Description: Complications related to intravenous access or infusion will be avoided or minimized  5/24/2021 2318 by Jesus Alberto Sewell RN  Outcome: Ongoing  5/24/2021 1804 by Carlos Howard RN  Outcome: Ongoing  Goal: Will show no infection signs and symptoms  Description: Will show no infection signs and symptoms  5/24/2021 2318 by Jesus Alberto Sewell RN  Outcome: Ongoing  5/24/2021 1804 by Carlos Howard RN  Outcome: Ongoing  Goal: Absence of new skin breakdown  Description: Absence of new skin breakdown  5/24/2021 2318 by Jesus Alberto Sewell RN  Outcome: Ongoing  5/24/2021 1804 by Carlos Howard RN  Outcome: Ongoing     Problem: Musculor/Skeletal Functional Status  Goal: Highest potential functional level  5/24/2021 2318 by Jesus Alberto Sewell RN  Outcome: Ongoing  5/24/2021 1804 by Carlos Howard RN  Outcome: Ongoing  Goal: Absence of falls  5/24/2021 2318 by Jesus Alberto Sewell RN  Outcome: Ongoing  5/24/2021 1804 by Carlos Howard RN  Outcome: Ongoing

## 2021-05-25 NOTE — ADT AUTH CERT
Utilization Reviews       Cervical Fusion, Posterior - Care Day 5 (5/25/2021) by Flora Sanchez RN       Review Status Review Entered   Completed 5/25/2021 15:31      Criteria Review      Care Day: 5 Care Date: 5/25/2021 Level of Care: Inpatient Floor    Guideline Day 3    Clinical Status    (X) * No evidence of infection    5/25/2021 3:31 PM EDT by Spike Abdi      No evidence of infection    (X) * No evidence of vascular compromise    5/25/2021 3:31 PM EDT by Spike Abdi      No evidence of vascular compromise    (X) * Hemodynamic stability    5/25/2021 3:31 PM EDT by Spike Abdi      98.5 (36.9) 18  77  123/46 93%RA    (X) * Respiration at baseline    5/25/2021 3:31 PM EDT by Spike Abdi      93%RA    (X) * Voiding ability at baseline    5/25/2021 3:31 PM EDT by Spike Abdi      Voiding ability at baseline    (X) * Neurologic status at baseline    5/25/2021 3:31 PM EDT by Spike Abdi      Voiding ability at baseline    (X) * Pain absent or managed    5/25/2021 3:31 PM EDT by Lefty soliman    ( ) * Discharge plans and education understood    5/25/2021 3:31 PM EDT by [address] [place] [address] Discharge planning [facility] pending precet    Activity    (X) * Ambulatory or acceptable for next level of care    5/25/2021 3:31 PM EDT by Leandro Velez: Rolling Walker  Assistance: Minimal assistance  Quality of Gait: Decreased step length bilaterally.  Shuffling, nearly festinating gait. Poor positioning between her body and the walker.    Distance: 39'    Routes    (X) * Oral hydration    5/25/2021 3:31 PM EDT by Spike Abdi      Oral hydration    (X) * Oral medications or regimen acceptable for next level of care    5/25/2021 3:31 PM EDT by Spike Abdi      Oral medications or regimen acceptable for next level of care    (X) * Oral diet or acceptable for next level of care    5/25/2021 3:31 PM EDT by Adriana Nobles diet    Medications    (X) * PCA absent    5/25/2021 3:31 PM EDT by Jeannie Jenkins      absent    * Milestone   Additional Notes   5/25/21      No acute events overnight.  No new complaints. Doing well this morning sitting up in the chair eating breakfast. Afebrile. Pain well controlled with current regimen.  Denies numbness and tingling to extremities, denies urinary and bowel issues, passing flatus       PE:    AOx3    Motor    L deltoid 5/5; R deltoid 5/5   L biceps 5/5; R biceps 5/5   L triceps 5/5; R triceps 5/5    L iliopsoas 5/5 , R iliopsoas 5/5   L quadriceps 5/5; R quadriceps 5/5   L Dorsiflexion 5/5; R dorsiflexion 5/5   L Plantarflexion 5/5; R plantarflexion 5/5   L EHL 5/5; R EHL 5/5   Sensation: intact    Incision: intact no drainage, erythema and edema noted              Neurosurgery plan   A/P   80 y.o. female who presents with cervical myelopathy   POD #4 s/p C3-6 posterior laminectomy and fusion       Aspen collar to be worn while out of bed, okay to remove while eating, drinking and sleeping   Encourage incentive spirometer   PT and OT for evaluation   IV morphine discontinued yesterday   Lovenox for DVT prophylaxis   Continue Roxicodone for pain management   Discharge planning [facility] pending precet          MEDS  MEDS  Tylenol 650 mg po Q6h    Nesina 12.5 mg po QD    Norvasc 5 mg po QD    Lipitor 40 mg po HS    Caltrate 600-400 mg po QD    Coreg 21.5 mg po BID    Celexa 20 mg po QD    Lovenox 40 mg SC QD     Tricor 134 mg po QD    Ferrous sulfate 325 mg po BID    Robaxin 750 mg po QID    Lisinopril 30 mg po QD    Protonix 40 mg po QD    Vit B 12 1000 mcg po QD                  Cervical Fusion, Posterior - Care Day 4 (5/24/2021) by Teena Valderrama RN       Review Status Review Entered   Completed 5/25/2021 15:27      Criteria Review      Care Day: 4 Care Date: 5/24/2021 Level of Care: Inpatient Floor    Guideline Day 3    Clinical Status    (X) * No evidence of infection 5/25/2021 3:27 PM EDT by Papo Gatica      No evidence of infection    (X) * No evidence of vascular compromise    5/25/2021 3:27 PM EDT by Papo Gatica      No evidence of vascular compromise    (X) * Hemodynamic stability    5/25/2021 3:27 PM EDT by Papo Gatica      97.9 (36.6)  18  74  147/52    (X) * Respiration at baseline    5/25/2021 3:27 PM EDT by Papo Gatica      93%RA    (X) * Voiding ability at baseline    5/25/2021 3:27 PM EDT by Papo Gatica      Voiding ability at baseline    (X) * Neurologic status at baseline    5/25/2021 3:27 PM EDT by Papo Gatica      Neurologic status at baseline    (X) * Pain absent or managed    5/25/2021 3:27 PM EDT by Papo Gatica      controlled  morphine 2 mg IV x1   Roxicodone 10 mg po x6  Reports that her pain has improved significantly since yesterday    ( ) * Discharge plans and education understood    5/25/2021 3:27 PM EDT by Mansoor Hodge Discharge [address] rehab versus [facility] Christiano    Activity    (X) * Ambulatory or acceptable for next level of care    5/25/2021 3:27 PM EDT by Desmond Tarango: Caitlin Walker  Assistance: Minimal assistance  Gait Deviations: Slow Alicia;Staggers  Distance: amb 25 ft with a RW x min assist    Routes    (X) * Oral hydration    5/25/2021 3:27 PM EDT by Papo Gatica      Oral hydration    ( ) * Oral medications or regimen acceptable for next level of care    5/25/2021 3:27 PM EDT by Papo Gatica      morphine 2 mg IV x1    (X) * Oral diet or acceptable for next level of care    5/25/2021 3:27 PM EDT by Shoshana Pate, [facility]; Carb Control: 5 carb choices (75 gms)/meal    Medications    (X) * PCA absent    5/25/2021 3:27 PM EDT by Mansoor Hodge PCA Absent    * Milestone   Additional Notes   5/24/21    No acute events overnight.  No new complaints.  Afebrile, resting in bed, tolerating diet this morning.  Reports she is passing Additional Notes   5/23/21    Postop day 2 status post C3-6 laminectomy and fusion for cervical myelopathy   Drain Dc'd   5 out of 5 strength   DVT prophylaxis   Discharge planning for rehab   Well-controlled pain      Patient fell when walking to bathroom with nurse. Jimmie Dawson hit her hip on side table, but did not hit her head or neck. She complains of some new hip pain, other wise post op pain is controlled. She is tolerating PO and urinating without difficulty.        PE:    AOx3    Motor    L deltoid 5/5; R deltoid 5/5   L biceps 5/5; R biceps 5/5   L triceps 5/5; R triceps 5/5   L wrist extension 5/5; R wrist extension 5/5   L intrinsics 5/5; R intrinsics 5/5    L iliopsoas 5/5 , R iliopsoas 5/5   L quadriceps 5/5; R quadriceps 5/5   L Dorsiflexion 5/5; R dorsiflexion 5/5   L Plantarflexion 5/5; R plantarflexion 5/5   L EHL 5/5; R EHL 5/5   Sensation intact    Drain output 70ml/12hr   Incision c/d/I      Neurosurgery plan   A/P   80 y.o. female who presents with cervical myelopathy   POD 2 s/p C3-6 posterior laminectomy and fusion                - Aspen collar to be worn when out of bed                - Encourage mobilization                - JULIO drain discontinued due to low output, patient tolerated removal without complication   - Lovenox and SCDs for dvt ppx   - Discharge planning to rehab   - Encourage IS      MEDS  Tylenol 650 mg po Q6h    Nesina 12.5 mg po QD    Norvasc 5 mg po QD    Lipitor 40 mg po HS    Caltrate 600-400 mg po QD    Coreg 21.5 mg po BID    Celexa 20 mg po QD    Lovenox 40 mg SC QD     Tricor 134 mg po QD    Ferrous sulfate 325 mg po BID    Robaxin 750 mg po QID    Lisinopril 30 mg po QD    Protonix 40 mg po QD    Vit B 12 1000 mcg po QD    0.9%NS 75 ml/hr Ivcont

## 2021-05-25 NOTE — PROGRESS NOTES
Occupational Therapy  Facility/Department: [address] ORTHO/MED SURG  Daily Treatment Note  NAME: Shima Ward  : 1950  MRN: [id number]    Date of Service: 2021    Discharge Recommendations:  Patient would benefit from continued therapy after discharge   Ed on OT services, ADLs, orientation review, sx soap prec, walker safety/technique, fall prevention tips- good return       Assessment   Performance deficits / Impairments: Decreased functional mobility ; Decreased ADL status; Decreased strength;Decreased endurance;Decreased balance;Decreased high-level IADLs;Decreased ROM; Decreased posture  Treatment Diagnosis: cervical fusion  Prognosis: Good  REQUIRES OT FOLLOW UP: Yes  Activity Tolerance  Activity Tolerance: Patient Tolerated treatment well  Safety Devices  Safety Devices in place: Yes  Type of devices: All fall risk precautions in place;Call light within reach; Chair alarm in place; Left in chair;Nurse notified         Patient Diagnosis(es): There were no encounter diagnoses.       has a past medical history of Allergic rhinitis, cause unspecified, Back pain, Bowel obstruction (HCC), C. difficile diarrhea, CAD (coronary artery disease), Cardiac murmur, Cellulitis, Cellulitis, Cerebral artery occlusion with cerebral infarction ([address] COVID-19, Diverticulosis of colon (without mention of hemorrhage), GERD (gastroesophageal reflux disease), GERD (gastroesophageal reflux disease), History of blood transfusion, History of CHF (congestive heart failure), History of MI (myocardial infarction), History of ovarian cyst, History of peritonitis, HTN (hypertension), Hx of blood clots, Hyperlipidemia, Intestinal or peritoneal adhesions with obstruction (postoperative) (postinfection) ([address] Kidney infection, Lateral epicondylitis  of elbow, MDRO (multiple drug resistant organisms) resistance, Muscle strain, Other abnormal glucose, PONV (postoperative nausea and vomiting), Pre-diabetes, Restless legs syndrome (RLS), Snores, 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 ischemic attack), Uses walker, Vitamin D deficiency, Wears glasses, and Wellness examination. has a past surgical history that includes Ovary removal (1970); colectomy (0919); Appendectomy (1968); Ovary removal (1971); Hysterectomy (1973); Bunionectomy (Left); sinus surgery (2004); Colonoscopy; other surgical history (08/14/2014); cyst removal (Right); Wrist fracture surgery (Left, 03/05/2019); Upper gastrointestinal endoscopy (N/A, 05/31/2019); Upper gastrointestinal endoscopy (N/A, 08/05/2019); Upper gastrointestinal endoscopy (N/A, 08/23/2019); Abdomen surgery (1976); lumbar fusion (N/A, 02/10/2020); Cardiac catheterization; Cardiac catheterization (2005); [place] tooth extraction; lumbar fusion (N/A, 06/17/2020); back surgery; cervical fusion (05/21/2021); and cervical fusion (N/A, 5/21/2021). Restrictions  Restrictions/Precautions  Restrictions/Precautions: Fall Risk  Required Braces or Orthoses?: Yes  Required Braces or Orthoses  Cervical: c-collar  Position Activity Restriction  Other position/activity restrictions: activity as tolerated, C3-C6 fusion 5/21  Subjective   General  Patient assessed for rehabilitation services?: Yes  Family / Caregiver Present: No  Diagnosis: cervical fusion    Pain Assessment  Pain Level: 7  Pain Type: Acute pain  Pain Location: Neck  Pain Orientation: Left;Right  Pain Descriptors: Aching  Pain Frequency: Continuous  Response to Pain Intervention: Patient Satisfied  Vital Signs  Patient Currently in Pain: Yes   Orientation  Orientation  Overall Orientation Status: Within Functional Limits  Objective    Pt was seated on BSC w/ SBA given by [facility],upon arrival, Lay Barwick took over pt's care. Pt sat on BSC and verena personal shoes mod A. Min A to verena cervical collar since pt was not wearing it upon arrival.  Pt transferred to the bathroom from [facility] to sit on toilet to complete ADLs.   Pt given more assistance w/ transfers d/t decreased strength, balance, and coordination. Pt L LE still demo slow speed/coordination w/ stepping and dragging L foot noted. Pt required more assistance w/ ADLs d/t decrease strength and balance. Pt retired to recliner at end of session. Pt receptive to ed and took rest breaks prn during the session. ADL  Grooming: Setup;Contact guard assistance; Increased time to complete (dynamic standing at the sink)  UE Bathing: Setup;Stand by assistance; Increased time to complete (seated on toilet)  LE Bathing: Setup;Stand by assistance; Increased time to complete (seated on toilet)  UE Dressing: Setup;Minimal assistance  LE Dressing: Setup; Moderate assistance; Increased time to complete (seated in recliner)  Toileting: Setup; Moderate assistance; Increased time to complete;Maximum assistance  Additional Comments: pt used sx soap for UB/LB bathing appropriately        Balance  Sitting Balance: Stand by assistance  Standing Balance: Contact guard assistance (w/RW)  Standing Balance  Time: stood ~ 10 min  Activity: dynamic standing at sink, BSC <bathroom<recliner, dynamic standing for backside mgt and brice care  Comment: pt used RW for support/safety    Toilet Transfers  Toilet - Technique: Ambulating  Equipment Used: Raised toilet seat with rails  Toilet Transfer: Minimal assistance  Toilet Transfers Comments: pt used toilet grab bars for support/safety     Transfers  Stand Step Transfers: Contact guard assistance  Sit to stand:  Moderate assistance  Stand to sit: Minimal assistance  Attendance  Participation: Active participation      Plan   Plan  Times per week: 3-5x/wk    Goals  Short term goals  Time Frame for Short term goals: pt will, by discharge  Short term goal 1: complete LB ADLs and toileting tasks with min A, set up and Ae, as needed  Short term goal 2: complete UB ADLs and grooming tasks with mod I and set up  Short term goal 3: increase activity tolerance to 25+ minutes in order to participate in daily tasks  Short term goal 4: dem CGA during functional transfers/functional mobility with LRD and no buckling/LOB  Short term goal 5: dem ~5 minutes static/dynamic standing balance with CGA and LRD in order to complete functional tasks  Short term goal 6: participate in ~10 minutes meaningful activity in order to increase L UE coordination       Therapy Time   Individual Concurrent Group Co-treatment   Time In 0910         Time Out 0955         Minutes 45             time code min: 45 min    [address]L

## 2021-05-26 LAB
GLUCOSE BLD-MCNC: 102 MG/DL (ref 65–105)
GLUCOSE BLD-MCNC: 106 MG/DL (ref 65–105)
GLUCOSE BLD-MCNC: 109 MG/DL (ref 65–105)

## 2021-05-26 PROCEDURE — 97110 THERAPEUTIC EXERCISES: CPT

## 2021-05-26 PROCEDURE — 97530 THERAPEUTIC ACTIVITIES: CPT

## 2021-05-26 PROCEDURE — 1200000000 HC SEMI PRIVATE

## 2021-05-26 PROCEDURE — 6370000000 HC RX 637 (ALT 250 FOR IP): Performed by: NURSE PRACTITIONER

## 2021-05-26 PROCEDURE — APPSS15 APP SPLIT SHARED TIME 0-15 MINUTES: Performed by: NURSE PRACTITIONER

## 2021-05-26 PROCEDURE — 6360000002 HC RX W HCPCS: Performed by: PHYSICIAN ASSISTANT

## 2021-05-26 PROCEDURE — 2580000003 HC RX 258: Performed by: NURSE PRACTITIONER

## 2021-05-26 PROCEDURE — 82947 ASSAY GLUCOSE BLOOD QUANT: CPT

## 2021-05-26 PROCEDURE — 97116 GAIT TRAINING THERAPY: CPT

## 2021-05-26 RX ADMIN — METHOCARBAMOL TABLETS 750 MG: 750 TABLET, COATED ORAL at 16:43

## 2021-05-26 RX ADMIN — PANTOPRAZOLE SODIUM 40 MG: 40 TABLET, DELAYED RELEASE ORAL at 16:43

## 2021-05-26 RX ADMIN — METHOCARBAMOL TABLETS 750 MG: 750 TABLET, COATED ORAL at 09:35

## 2021-05-26 RX ADMIN — Medication 1 TABLET: at 09:34

## 2021-05-26 RX ADMIN — OXYCODONE HYDROCHLORIDE 10 MG: 5 TABLET ORAL at 06:55

## 2021-05-26 RX ADMIN — Medication 1000 MCG: at 09:35

## 2021-05-26 RX ADMIN — POLYETHYLENE GLYCOL 3350 17 G: 17 POWDER, FOR SOLUTION ORAL at 09:34

## 2021-05-26 RX ADMIN — CARVEDILOL 12.5 MG: 12.5 TABLET, FILM COATED ORAL at 20:57

## 2021-05-26 RX ADMIN — METHOCARBAMOL TABLETS 750 MG: 750 TABLET, COATED ORAL at 22:58

## 2021-05-26 RX ADMIN — OXYCODONE HYDROCHLORIDE 10 MG: 5 TABLET ORAL at 03:24

## 2021-05-26 RX ADMIN — CITALOPRAM 20 MG: 20 TABLET, FILM COATED ORAL at 09:35

## 2021-05-26 RX ADMIN — DESMOPRESSIN ACETATE 40 MG: 0.2 TABLET ORAL at 20:57

## 2021-05-26 RX ADMIN — CARVEDILOL 12.5 MG: 12.5 TABLET, FILM COATED ORAL at 09:35

## 2021-05-26 RX ADMIN — FERROUS SULFATE TAB EC 325 MG (65 MG FE EQUIVALENT) 325 MG: 325 (65 FE) TABLET DELAYED RESPONSE at 09:35

## 2021-05-26 RX ADMIN — LISINOPRIL 30 MG: 20 TABLET ORAL at 09:34

## 2021-05-26 RX ADMIN — SODIUM CHLORIDE, PRESERVATIVE FREE 10 ML: 5 INJECTION INTRAVENOUS at 09:35

## 2021-05-26 RX ADMIN — AMLODIPINE BESYLATE 5 MG: 5 TABLET ORAL at 09:34

## 2021-05-26 RX ADMIN — FENOFIBRATE 134 MG: 54 TABLET ORAL at 06:50

## 2021-05-26 RX ADMIN — Medication 1 TABLET: at 20:57

## 2021-05-26 RX ADMIN — FERROUS SULFATE TAB EC 325 MG (65 MG FE EQUIVALENT) 325 MG: 325 (65 FE) TABLET DELAYED RESPONSE at 20:57

## 2021-05-26 RX ADMIN — ACETAMINOPHEN 650 MG: 325 TABLET ORAL at 18:25

## 2021-05-26 RX ADMIN — SODIUM CHLORIDE, PRESERVATIVE FREE 10 ML: 5 INJECTION INTRAVENOUS at 20:57

## 2021-05-26 RX ADMIN — OXYCODONE HYDROCHLORIDE 10 MG: 5 TABLET ORAL at 22:59

## 2021-05-26 RX ADMIN — ENOXAPARIN SODIUM 40 MG: 40 INJECTION, SOLUTION INTRAVENOUS; SUBCUTANEOUS at 09:35

## 2021-05-26 RX ADMIN — ACETAMINOPHEN 650 MG: 325 TABLET ORAL at 06:50

## 2021-05-26 RX ADMIN — ACETAMINOPHEN 650 MG: 325 TABLET ORAL at 13:05

## 2021-05-26 RX ADMIN — ALOGLIPTIN 12.5 MG: 12.5 TABLET, FILM COATED ORAL at 09:34

## 2021-05-26 RX ADMIN — METHOCARBAMOL TABLETS 750 MG: 750 TABLET, COATED ORAL at 13:05

## 2021-05-26 RX ADMIN — PANTOPRAZOLE SODIUM 40 MG: 40 TABLET, DELAYED RELEASE ORAL at 06:50

## 2021-05-26 ASSESSMENT — PAIN DESCRIPTION - PROGRESSION
CLINICAL_PROGRESSION: NOT CHANGED

## 2021-05-26 ASSESSMENT — PAIN SCALES - GENERAL
PAINLEVEL_OUTOF10: 7
PAINLEVEL_OUTOF10: 8
PAINLEVEL_OUTOF10: 8
PAINLEVEL_OUTOF10: 6
PAINLEVEL_OUTOF10: 6
PAINLEVEL_OUTOF10: 8
PAINLEVEL_OUTOF10: 7

## 2021-05-26 ASSESSMENT — PAIN DESCRIPTION - FREQUENCY: FREQUENCY: CONTINUOUS

## 2021-05-26 ASSESSMENT — PAIN DESCRIPTION - PAIN TYPE: TYPE: ACUTE PAIN;SURGICAL PAIN

## 2021-05-26 ASSESSMENT — PAIN DESCRIPTION - ONSET: ONSET: ON-GOING

## 2021-05-26 ASSESSMENT — PAIN DESCRIPTION - DESCRIPTORS: DESCRIPTORS: ACHING

## 2021-05-26 ASSESSMENT — PAIN DESCRIPTION - LOCATION: LOCATION: NECK

## 2021-05-26 ASSESSMENT — PAIN DESCRIPTION - ORIENTATION: ORIENTATION: POSTERIOR

## 2021-05-26 NOTE — PROGRESS NOTES
Neurosurgery TRICIA/Resident    Daily Progress Note   CC:No chief complaint on file. 5/26/2021  6:02 AM    Chart reviewed. No acute events overnight. No new complaints. Afebrile. Eating breakfast this morning. Reports her pain is well controlled. Denies numbness and tingling, chest pain and SOB, had BM this morning and urinating without difficulties.      Vitals:    05/24/21 1533 05/24/21 2030 05/25/21 0715 05/25/21 2100   BP: (!) 147/52 (!) 144/57 (!) 123/46 (!) 144/64   Pulse: 74 74 77 74   Resp: 18 18  18   Temp: 97.9 °F (36.6 °C) 99 °F (37.2 °C) 98.5 °F (36.9 °C) 98.5 °F (36.9 °C)   TempSrc: Oral Oral Oral Oral   SpO2:   93%    Weight:       Height:           PE:   AOx3   Motor   L deltoid 5/5; R deltoid 5/5  L biceps 5/5; R biceps 5/5  L triceps 5/5; R triceps 5/5     L iliopsoas 5/5 , R iliopsoas 5/5  L quadriceps 5/5; R quadriceps 5/5  L Dorsiflexion 5/5; R dorsiflexion 5/5  L Plantarflexion 5/5; R plantarflexion 5/5  L EHL 5/5; R EHL 5/5    Sensation: intact   Incision: intact no drainage, erythema or edema noted       Lab Results   Component Value Date    WBC 15.1 (H) 05/22/2021    HGB 10.4 (L) 05/22/2021    HCT 33.1 (L) 05/22/2021     05/22/2021    CHOL 103 05/20/2019    TRIG 66 05/20/2019    HDL 74 03/12/2021    ALT 17 01/27/2020    AST 22 01/27/2020     05/07/2021    K 4.2 05/07/2021     05/07/2021    CREATININE 1.28 (H) 05/21/2021    BUN 64 (H) 05/07/2021    CO2 22 05/07/2021    TSH 1.43 05/20/2019    INR 1.0 04/08/2020    LABA1C 5.2 02/22/2021    LABMICR 11 08/05/2015    CRP 0.5 01/25/2018       A/P  80 y.o. female who presents with cervical myelopathy  POD #5 s/p C3-6 posterior laminectomy and fusion    PT and OT for eval  Ambulate patient  Encourage incentive spirometer  Tolerating diet  Pain controlled with current medications  On Lovenox for DVT prophylaxis   Aspen collar to be worn while out of bed, okay to remove while eating, drinking and sleeping  Pending placement to [facility] for acute rehab     Please contact neurosurgery with any changes in patients neurologic status.        Janett Marin CNP  5/26/21  6:02 AM

## 2021-05-26 NOTE — CARE COORDINATION
Transitional planning:  Called Vanessa at [facility] and left  inquiring about precert. 0915 Attempted to give pt second IMM letter, but she was on the commode. Will return. 1150 [facility] letter given. 1624 Received call from [facility] who received denial for this pt by insurance. She is contacting the doctor to see if they will proceed with a peer to peer and call writer back.

## 2021-05-26 NOTE — PROGRESS NOTES
Pre-diabetes, Restless legs syndrome (RLS), Snores, 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[facility] [address] TIA (transient ischemic attack), Uses walker, Vitamin D deficiency, Wears glasses, and Wellness examination. has a past surgical history that includes Ovary removal (1970); colectomy (1541); Appendectomy (1968); Ovary removal (1971); Hysterectomy (1973); Bunionectomy (Left); sinus surgery (2004); Colonoscopy; other surgical history (08/14/2014); cyst removal (Right); Wrist fracture surgery (Left, 03/05/2019); Upper gastrointestinal endoscopy (N/A, 05/31/2019); Upper gastrointestinal endoscopy (N/A, 08/05/2019); Upper gastrointestinal endoscopy (N/A, 08/23/2019); Abdomen surgery (1976); lumbar fusion (N/A, 02/10/2020); Cardiac catheterization; Cardiac catheterization (2005); [place] tooth extraction; lumbar fusion (N/A, 06/17/2020); back surgery; cervical fusion (05/21/2021); and cervical fusion (N/A, 5/21/2021). Restrictions  Restrictions/Precautions  Restrictions/Precautions: Fall Risk  Required Braces or Orthoses?: Yes  Required Braces or Orthoses  Cervical: c-collar  Position Activity Restriction  Other position/activity restrictions: activity as tolerated, C3-C6 fusion 5/21. Aspen collar on when out of bed. OK to remove while eating, drinking, sleeping. Subjective   General  Response To Previous Treatment: Patient with no complaints from previous session. Family / Caregiver Present: No  Subjective  Subjective: RN and pt in agreement for PT; Pt5 alert in bed upon arrival ;. pt very pleasant and cooperative throughout session  General Comment  Comments: c-collar donned in supine prior tp mobility  Pain Screening  Patient Currently in Pain: No  Vital Signs  Patient Currently in Pain: No       Orientation  Orientation  Overall Orientation Status: Within Normal Limits  Cognition      Objective   Bed mobility  Rolling to Right: Moderate assistance  Supine to Sit: Moderate assistance  Scooting:  Moderate assistance  Comment: Increase time and effort noted. Pt educated on Log rolling technigue with fair return. Transfers  Sit to Stand: Minimal Assistance  Stand to sit: Minimal Assistance  Comment: Sit to stand x3  performed with RW from EOB, recliner and commot. LOB x1 noted requiring MOD A to correct  Ambulation  Ambulation?: Yes  Ambulation 1  Surface: level tile  Device: Rolling Walker  Assistance: Minimal assistance  Quality of Gait: .  Shuffling, nearly festinating gait. Poor positioning between her body and the walker. Gait Deviations: Slow Alicia;Staggers; Shuffles;Decreased step length;Decreased step height  Distance: 50ft; 8ft x2  Comments: Max cueing for posture ,safety and RW management. Pt unsafe to amb without assist at this time     Balance  Posture: Good  Sitting - Static: Good;-  Sitting - Dynamic: Fair  Standing - Static: Fair  Standing - Dynamic: Fair  Comments: standing balance assessed w/ RW  Exercises  Quad Sets: 10 reps  Heelslides: x10 reps  Hip Flexion: 10 reps  Knee Long Arc Quad: x10 reps  Ankle Pumps: x10 reps     Goals  Short term goals  Time Frame for Short term goals: 14  Short term goal 1: Pt to perform bed mobility CGA  Short term goal 2: Pt to demonstrate functional transfers SBA  Short term goal 3: Ambulate 100ft w/ RW SBA  Short term goal 4: Demonstrate standing dynamic balance of good - with AD to decrease fall risk with functional mobility  Patient Goals   Patient goals :  To get stronger    Plan    Plan  Times per week: 6-7x/week  Current Treatment Recommendations: Strengthening, Transfer Training, Endurance Training, Patient/Caregiver Education & Training, ROM, Equipment Evaluation, Education, & procurement, Balance Training, Gait Training, Home Exercise Program, Functional Mobility Training, Stair training, Safety Education & Training  Safety Devices  Type of devices: Patient at risk for falls, Call light within reach, Gait belt, Nurse notified, Left in chair, Chair alarm in place, All fall risk precautions in place  Restraints  Initially in place: No     Therapy Time   Individual Concurrent Group Co-treatment   Time In 1112         Time Out 1151         Minutes 39         Timed Code Treatment Minutes: AMNA [facility]ario

## 2021-05-26 NOTE — PLAN OF CARE
respiratory secretions will improve  Description: Ability to maintain normal respiratory secretions will improve  5/25/2021 2129 by Sahraa Leon RN  Outcome: Ongoing  5/25/2021 1834 by Nelsy Noble RN  Outcome: Ongoing     Problem: Skin Integrity:  Goal: Demonstration of wound healing without infection will improve  Description: Demonstration of wound healing without infection will improve  5/25/2021 2129 by Sahara Leon RN  Outcome: Ongoing  5/25/2021 1834 by Nelsy Noble RN  Outcome: Ongoing  Goal: Complications related to intravenous access or infusion will be avoided or minimized  Description: Complications related to intravenous access or infusion will be avoided or minimized  5/25/2021 2129 by Sahara Leon RN  Outcome: Ongoing  5/25/2021 1834 by Nelsy Noble RN  Outcome: Ongoing  Goal: Will show no infection signs and symptoms  Description: Will show no infection signs and symptoms  5/25/2021 2129 by Sahara Leon RN  Outcome: Ongoing  5/25/2021 1834 by Nelsy Noble RN  Outcome: Ongoing  Goal: Absence of new skin breakdown  Description: Absence of new skin breakdown  5/25/2021 2129 by Sahara Leon RN  Outcome: Ongoing  5/25/2021 1834 by Nelsy Noble RN  Outcome: Ongoing     Problem: Musculor/Skeletal Functional Status  Goal: Highest potential functional level  5/25/2021 2129 by Sahara Leon RN  Outcome: Ongoing  5/25/2021 1834 by Nelsy Noble RN  Outcome: Ongoing  Goal: Absence of falls  5/25/2021 2129 by Sahara Leon RN  Outcome: Ongoing  5/25/2021 1834 by Nelsy Noble RN  Outcome: Ongoing

## 2021-05-26 NOTE — PROGRESS NOTES
Rcv'd vm from ANABEL, [facility] CM, requesting status of precert. Writer informed ANABEL that precert has not yet been returned. Called [employer] to determine status of precert. Spoke with Dannielle Nguyen @ [employer] who states precert is pending. Writer requested contact info for Dr Robbie Palomo to call to discuss review timelines for precert determination, and Dannielle Nguyen states Willy Estes @ [employer] is reviewing pt's case. Dannielle Nguyen provided Radha's contact # which is [phone]. Contact info provided to Dr Robbie Palomo. Rcv'd call from [facility] with denial for ARU  d/t \"no clear benefit for ARU over lower LOC, and no medical complexity requiring PMR oversight 3x/week\". Dr Robbie Palomo notified, and writer requested if P2P is approp at this time. ANABEL notified.

## 2021-05-27 PROCEDURE — 1200000000 HC SEMI PRIVATE

## 2021-05-27 PROCEDURE — 97535 SELF CARE MNGMENT TRAINING: CPT

## 2021-05-27 PROCEDURE — APPSS30 APP SPLIT SHARED TIME 16-30 MINUTES: Performed by: PHYSICIAN ASSISTANT

## 2021-05-27 PROCEDURE — 97116 GAIT TRAINING THERAPY: CPT

## 2021-05-27 PROCEDURE — 6360000002 HC RX W HCPCS: Performed by: PHYSICIAN ASSISTANT

## 2021-05-27 PROCEDURE — 97110 THERAPEUTIC EXERCISES: CPT

## 2021-05-27 PROCEDURE — 6370000000 HC RX 637 (ALT 250 FOR IP): Performed by: NURSE PRACTITIONER

## 2021-05-27 RX ADMIN — CARVEDILOL 12.5 MG: 12.5 TABLET, FILM COATED ORAL at 21:13

## 2021-05-27 RX ADMIN — Medication 1 TABLET: at 21:14

## 2021-05-27 RX ADMIN — AMLODIPINE BESYLATE 5 MG: 5 TABLET ORAL at 10:36

## 2021-05-27 RX ADMIN — OXYCODONE HYDROCHLORIDE 10 MG: 5 TABLET ORAL at 19:21

## 2021-05-27 RX ADMIN — CITALOPRAM 20 MG: 20 TABLET, FILM COATED ORAL at 10:37

## 2021-05-27 RX ADMIN — ACETAMINOPHEN 650 MG: 325 TABLET ORAL at 00:23

## 2021-05-27 RX ADMIN — METHOCARBAMOL TABLETS 750 MG: 750 TABLET, COATED ORAL at 21:13

## 2021-05-27 RX ADMIN — CARVEDILOL 12.5 MG: 12.5 TABLET, FILM COATED ORAL at 10:36

## 2021-05-27 RX ADMIN — METHOCARBAMOL TABLETS 750 MG: 750 TABLET, COATED ORAL at 15:05

## 2021-05-27 RX ADMIN — ENOXAPARIN SODIUM 40 MG: 40 INJECTION, SOLUTION INTRAVENOUS; SUBCUTANEOUS at 10:37

## 2021-05-27 RX ADMIN — FENOFIBRATE 134 MG: 54 TABLET ORAL at 06:23

## 2021-05-27 RX ADMIN — ACETAMINOPHEN 650 MG: 325 TABLET ORAL at 06:23

## 2021-05-27 RX ADMIN — OXYCODONE HYDROCHLORIDE 10 MG: 5 TABLET ORAL at 15:03

## 2021-05-27 RX ADMIN — METHOCARBAMOL TABLETS 750 MG: 750 TABLET, COATED ORAL at 10:37

## 2021-05-27 RX ADMIN — Medication 1000 MCG: at 10:36

## 2021-05-27 RX ADMIN — DESMOPRESSIN ACETATE 40 MG: 0.2 TABLET ORAL at 21:14

## 2021-05-27 RX ADMIN — Medication 1 TABLET: at 10:37

## 2021-05-27 RX ADMIN — LISINOPRIL 30 MG: 20 TABLET ORAL at 10:36

## 2021-05-27 RX ADMIN — ACETAMINOPHEN 650 MG: 325 TABLET ORAL at 15:05

## 2021-05-27 RX ADMIN — PANTOPRAZOLE SODIUM 40 MG: 40 TABLET, DELAYED RELEASE ORAL at 06:23

## 2021-05-27 RX ADMIN — PANTOPRAZOLE SODIUM 40 MG: 40 TABLET, DELAYED RELEASE ORAL at 15:05

## 2021-05-27 RX ADMIN — FERROUS SULFATE TAB EC 325 MG (65 MG FE EQUIVALENT) 325 MG: 325 (65 FE) TABLET DELAYED RESPONSE at 10:37

## 2021-05-27 RX ADMIN — ACETAMINOPHEN 650 MG: 325 TABLET ORAL at 21:14

## 2021-05-27 RX ADMIN — OXYCODONE HYDROCHLORIDE 10 MG: 5 TABLET ORAL at 10:36

## 2021-05-27 RX ADMIN — ALOGLIPTIN 12.5 MG: 12.5 TABLET, FILM COATED ORAL at 10:36

## 2021-05-27 RX ADMIN — FERROUS SULFATE TAB EC 325 MG (65 MG FE EQUIVALENT) 325 MG: 325 (65 FE) TABLET DELAYED RESPONSE at 21:13

## 2021-05-27 ASSESSMENT — PAIN DESCRIPTION - LOCATION
LOCATION: NECK
LOCATION: NECK;SHOULDER

## 2021-05-27 ASSESSMENT — PAIN SCALES - GENERAL
PAINLEVEL_OUTOF10: 4
PAINLEVEL_OUTOF10: 4
PAINLEVEL_OUTOF10: 8
PAINLEVEL_OUTOF10: 4
PAINLEVEL_OUTOF10: 0
PAINLEVEL_OUTOF10: 8
PAINLEVEL_OUTOF10: 7

## 2021-05-27 ASSESSMENT — PAIN DESCRIPTION - FREQUENCY: FREQUENCY: CONTINUOUS

## 2021-05-27 ASSESSMENT — PAIN DESCRIPTION - PAIN TYPE
TYPE: ACUTE PAIN
TYPE: ACUTE PAIN

## 2021-05-27 ASSESSMENT — PAIN DESCRIPTION - DESCRIPTORS: DESCRIPTORS: DISCOMFORT;ACHING

## 2021-05-27 ASSESSMENT — PAIN DESCRIPTION - ORIENTATION: ORIENTATION: POSTERIOR

## 2021-05-27 NOTE — PROGRESS NOTES
Occupational Therapy  Facility/Department: [address] ORTHO/MED SURG  Daily Treatment Note  NAME: Mookie Leach  : 1950  MRN: [id number]    Date of Service: 2021    Discharge Recommendations: Pt. Would benefit from further skilled services prior to returning home. Pt. Is a fall risk d/t pain and balance deficits. Patient would benefit from continued therapy after discharge  OT Equipment Recommendations  Equipment Needed: No  Other: Per pt. she has all equipment needs at home. Assessment   Performance deficits / Impairments: Decreased functional mobility ; Decreased ADL status; Decreased strength;Decreased endurance;Decreased balance;Decreased high-level IADLs;Decreased ROM; Decreased posture  Treatment Diagnosis: cervical fusion  Prognosis: Good  Decision Making: Medium Complexity  OT Education: Plan of Care;OT Role;Transfer Training;Precautions  Patient Education: pt ed on POc, purpose of tx, importance of movement, safety during functional transfers, hand placement during transfers. good return  REQUIRES OT FOLLOW UP: Yes  Activity Tolerance  Activity Tolerance: Patient Tolerated treatment well  Safety Devices  Safety Devices in place: Yes  Type of devices: All fall risk precautions in place;Call light within reach; Chair alarm in place; Left in chair;Nurse notified  Restraints  Initially in place: No         Patient Diagnosis(es): There were no encounter diagnoses.       has a past medical history of Allergic rhinitis, cause unspecified, Back pain, Bowel obstruction (HCC), C. difficile diarrhea, CAD (coronary artery disease), Cardiac murmur, Cellulitis, Cellulitis, Cerebral artery occlusion with cerebral infarction ([facility] [address] [address] COVID-19, Diverticulosis of colon (without mention of hemorrhage), GERD (gastroesophageal reflux disease), GERD (gastroesophageal reflux disease), History of blood transfusion, History of CHF (congestive heart failure), History of MI (myocardial infarction), History of ovarian cyst, History of peritonitis, HTN (hypertension), Hx of blood clots, Hyperlipidemia, Intestinal or peritoneal adhesions with obstruction (postoperative) (postinfection) ([address] Kidney infection, Lateral epicondylitis  of elbow, MDRO (multiple drug resistant organisms) resistance, Muscle strain, Other abnormal glucose, PONV (postoperative nausea and vomiting), Pre-diabetes, Restless legs syndrome (RLS), Snores, 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[address] TIA (transient ischemic attack), Uses walker, Vitamin D deficiency, Wears glasses, and Wellness examination. has a past surgical history that includes Ovary removal (1970); colectomy (1784); Appendectomy (1968); Ovary removal (1971); Hysterectomy (1973); Bunionectomy (Left); sinus surgery (2004); Colonoscopy; other surgical history (08/14/2014); cyst removal (Right); Wrist fracture surgery (Left, 03/05/2019); Upper gastrointestinal endoscopy (N/A, 05/31/2019); Upper gastrointestinal endoscopy (N/A, 08/05/2019); Upper gastrointestinal endoscopy (N/A, 08/23/2019); Abdomen surgery (1976); lumbar fusion (N/A, 02/10/2020); Cardiac catheterization; Cardiac catheterization (2005); [place] tooth extraction; lumbar fusion (N/A, 06/17/2020); back surgery; cervical fusion (05/21/2021); and cervical fusion (N/A, 5/21/2021). Restrictions  Restrictions/Precautions  Restrictions/Precautions: Fall Risk  Required Braces or Orthoses?: Yes  Required Braces or Orthoses  Cervical: c-collar  Position Activity Restriction  Other position/activity restrictions: activity as tolerated, C3-C6 fusion 5/21. Aspen collar on when out of bed. OK to remove while eating, drinking, sleeping. Subjective   General  Patient assessed for rehabilitation services?: Yes  Family / Caregiver Present: No  Diagnosis: cervical fusion  General Comment  Comments: RN ok'd for therapy this morning.  pt agreeable to participate in session and cooperative/pleasant throughout  Pain Assessment  Pain Level: 8  Pain Type: Acute pain  Pain Location: Neck  Pain Orientation: Posterior  Non-Pharmaceutical Pain Intervention(s): Emotional support; Ambulation/Increased Activity; Distraction  Vital Signs  Patient Currently in Pain: Yes   Orientation  Orientation  Overall Orientation Status: Within Functional Limits  Objective    ADL  Grooming: Setup; Increased time to complete;Modified independent   UE Bathing: Setup; Increased time to complete;Supervision  LE Bathing: Setup; Increased time to complete;Contact guard assistance;Minimal assistance  UE Dressing: Setup  LE Dressing: Setup; Increased time to complete;Minimal assistance  Toileting: Minimal assistance;Setup  Additional Comments: Pt. resting in recliner upon entering room. Pt. Tolerated UB bathe at S level + set up on tray table, sitting in recliner chair unsupported. LB bathe min-CGA for standing portions to ensure zero LOB + 1 UE support of RW. UB dress- set up (gown) sitting in recliner chair. LB dress (slip on shoes) Min A L shoe + increased time and effort, pt. was able to slip R shoe on without difficulty. Recliner<->BSC transfer min-CGA level + RW, frontal hygiene- SBA-S (sitting). C-collar on intier time. Balance  Sitting Balance: Stand by assistance (Sitting unsupported in recliner chair/BSC.)  Standing Balance: Minimal assistance (Min-CGA overall d/t mild posterior lean + support of RW.)  Standing Balance  Time: stood ~ 7 min  Activity: Dynamic standing for LB ADL, pivot transfer, toileiting. Comment: pt used RW for support/safety. Mild posterior lean, cues to keep upright posture. Toilet Transfers  Toilet - Technique: Stand pivot  Equipment Used: Standard bedside commode  Toilet Transfer: Minimal assistance  Toilet Transfers Comments: Min-CGA overall, Needing min cues to  L foot fully d/t dragging on floor.      Transfers  Stand Pivot Transfers: Contact guard assistance;Minimal assistance  Sit to stand: Contact guard assistance  Stand to sit: Contact guard

## 2021-05-27 NOTE — PROGRESS NOTES
Physical Therapy  Facility/Department: [address] ORTHO/MED SURG  Daily Treatment Note  NAME: Evon Rios  : 1950  MRN: [id number]    Date of Service: 2021    Discharge Recommendations:  Patient would benefit from continued therapy after discharge    Assessment   Body structures, Functions, Activity limitations: Decreased functional mobility ; Decreased posture;Decreased endurance;Decreased ROM; Decreased strength;Decreased balance;Decreased safe awareness; Increased pain;Decreased coordination  Assessment: The pt continue  require assist for safety with functional mobility , she is a high fall risk and is unsafe to amb without assist at this time. she would benefit from continue therapy to address deficits. Prognosis: Good  PT Education: PT Role;Goals;Plan of Care; Functional Mobility Training;General Safety;Transfer Training;Gait Training;Home Exercise Program  REQUIRES PT FOLLOW UP: Yes  Activity Tolerance  Activity Tolerance: Patient limited by fatigue;Patient limited by endurance; Patient limited by pain     Patient Diagnosis(es): There were no encounter diagnoses.      has a past medical history of Allergic rhinitis, cause unspecified, Back pain, Bowel obstruction (HCC), C. difficile diarrhea, CAD (coronary artery disease), Cardiac murmur, Cellulitis, Cellulitis, Cerebral artery occlusion with cerebral infarction ([facility] [address] COVID-19, Diverticulosis of colon (without mention of hemorrhage), GERD (gastroesophageal reflux disease), GERD (gastroesophageal reflux disease), History of blood transfusion, History of CHF (congestive heart failure), History of MI (myocardial infarction), History of ovarian cyst, History of peritonitis, HTN (hypertension), Hx of blood clots, Hyperlipidemia, Intestinal or peritoneal adhesions with obstruction (postoperative) (postinfection) ([address] Kidney infection, Lateral epicondylitis  of elbow, MDRO (multiple drug resistant organisms) resistance, Muscle strain, Other abnormal glucose, Yes       Orientation  Orientation  Overall Orientation Status: Within Normal Limits  Cognition      Objective   Bed mobility  Supine to Sit: Moderate assistance  Sit to Supine: Moderate assistance  Scooting: Moderate assistance  Transfers  Sit to Stand: Minimal Assistance  Stand to sit: Minimal Assistance  Comment: Sit to stand x3  perform with increase tuime for pt to come to full standing all atempts. Ambulation  Ambulation?: Yes  Ambulation 1  Device: Rolling Walker  Assistance: Minimal assistance  Quality of Gait: .  Shuffling, nearly festinating gait. Poor positioning between her body and the walker. Gait Deviations: Slow Alicia;Staggers; Shuffles;Decreased step length;Decreased step height  Distance: 60ft  Comments: Max cueing for posture ,safety and RW management. Pt unsafe to amb without assist at this time  Ambulation 2  Device 2: [address]: Minimal assistance  Gait Deviations: Decreased step height;Decreased step length; Increased KAREEM; Slow Alicia;Deviated path;Shuffles  Distance: 15ft x2  Comments: difficulty manuering obstacles. Balance  Posture: Good  Sitting - Static: Good;-  Standing - Static: Fair  Standing - Dynamic: Fair  Comments: standing balance assessed w/ RW  Exercises  Comments: Seated LE exercise program: Long Arc Quads, hip abduction/adduction, heel/toe raises, and marches. Reps: x10    Goals  Short term goals  Time Frame for Short term goals: 15  Short term goal 1: Pt to perform bed mobility CGA  Short term goal 2: Pt to demonstrate functional transfers SBA  Short term goal 3: Ambulate 100ft w/ RW SBA  Short term goal 4: Demonstrate standing dynamic balance of good - with AD to decrease fall risk with functional mobility  Patient Goals   Patient goals :  To get stronger    Plan    Plan  Times per week: 6-7x/week  Current Treatment Recommendations: Strengthening, Transfer Training, Endurance Training, Patient/Caregiver Education & Training, ROM, Equipment Evaluation, Education, & procurement, Balance Training, Gait Training, Home Exercise Program, Functional Mobility Training, Stair training, Safety Education & Training  Safety Devices  Type of devices: Patient at risk for falls, Call light within reach, Gait belt, Nurse notified, Left in chair, Chair alarm in place, All fall risk precautions in place  Restraints  Initially in place: No     Therapy Time   Individual Concurrent Group Co-treatment   Time In 0901         Time Out 0928         Minutes 27         Timed Code Treatment Minutes: [address] Wilson, PTA

## 2021-05-27 NOTE — PROGRESS NOTES
Notified [facility] CM that per Dr Chloe Blackmon and Dr Riggins Cover is not approp at this time d/t pt's lack of medical necessity required for ARU.

## 2021-05-27 NOTE — PROGRESS NOTES
Neurosurgery TRICIA/Resident    Daily Progress Note   No chief complaint on file. 5/27/2021  8:15 AM    Chart reviewed. No acute events overnight. No new complaints. Resting comfortably in bed. Vitals:    05/26/21 2004 05/26/21 2055 05/26/21 2257 05/27/21 0800   BP: (!) 147/48  (!) 156/47 (!) 164/57   Pulse: 72  72 73   Resp: 17   16   Temp: 98.7 °F (37.1 °C)   97.9 °F (36.6 °C)   TempSrc: Oral   Oral   SpO2:  97%  97%   Weight:       Height:         PE:   AOx3    Motor   L deltoid 5/5; R deltoid 5/5  L biceps 5/5; R biceps 5/5  L triceps 5/5; R triceps 5/5  L wrist extension 5/5; R wrist extension 5/5  L intrinsics 5/5; R intrinsics 5/5      L iliopsoas 5/5 , R iliopsoas 5/5  L quadriceps 5/5; R quadriceps 5/5  L Dorsiflexion 5/5; R dorsiflexion 5/5  L Plantarflexion 5/5; R plantarflexion 5/5  L EHL 5/5; R EHL 5/5    Sensation intact     Incision c/d/i      Lab Results   Component Value Date    WBC 15.1 (H) 05/22/2021    HGB 10.4 (L) 05/22/2021    HCT 33.1 (L) 05/22/2021     05/22/2021    CHOL 103 05/20/2019    TRIG 66 05/20/2019    HDL 74 03/12/2021    ALT 17 01/27/2020    AST 22 01/27/2020     05/07/2021    K 4.2 05/07/2021     05/07/2021    CREATININE 1.28 (H) 05/21/2021    BUN 64 (H) 05/07/2021    CO2 22 05/07/2021    TSH 1.43 05/20/2019    INR 1.0 04/08/2020    LABA1C 5.2 02/22/2021    LABMICR 11 08/05/2015    CRP 0.5 01/25/2018    SEDRATE 12 08/25/2019       A/P  80 y.o. female who presents with cervical myelopathy  POD #6 s/p C3-6 posterior laminectomy and fusion    Continue to encourage ambulation  Continue to encourage incentive spirometer  Continue Lovenox for DVT prophylaxis   Aspen collar to be worn while out of bed, okay to remove while eating, drinking and sleeping  [facility] for acute rehab denied, precert sent to SNF      Please contact neurosurgery with any changes in patients neurologic status.          Troy Rangel, PA   8:15 AM EDT

## 2021-05-27 NOTE — CARE COORDINATION
Transition planning  Call from Aileen at [address] [place] [address], states per Dr. Tristen Yarbrough and Dr. Brayan Lopez is not appropriate for patient. Spoke with patient, updated on above, she states her SNF choice is still [facility]. Called and spoke with Cecilia Goodpasture at [facility], she states she will submit for precert today.

## 2021-05-28 LAB
GLUCOSE BLD-MCNC: 106 MG/DL (ref 65–105)
GLUCOSE BLD-MCNC: 114 MG/DL (ref 65–105)

## 2021-05-28 PROCEDURE — 97530 THERAPEUTIC ACTIVITIES: CPT

## 2021-05-28 PROCEDURE — 82947 ASSAY GLUCOSE BLOOD QUANT: CPT

## 2021-05-28 PROCEDURE — 1200000000 HC SEMI PRIVATE

## 2021-05-28 PROCEDURE — 6360000002 HC RX W HCPCS: Performed by: PHYSICIAN ASSISTANT

## 2021-05-28 PROCEDURE — APPSS30 APP SPLIT SHARED TIME 16-30 MINUTES: Performed by: PHYSICIAN ASSISTANT

## 2021-05-28 PROCEDURE — 6370000000 HC RX 637 (ALT 250 FOR IP): Performed by: NURSE PRACTITIONER

## 2021-05-28 RX ADMIN — OXYCODONE HYDROCHLORIDE 10 MG: 5 TABLET ORAL at 00:12

## 2021-05-28 RX ADMIN — CARVEDILOL 12.5 MG: 12.5 TABLET, FILM COATED ORAL at 20:09

## 2021-05-28 RX ADMIN — FERROUS SULFATE TAB EC 325 MG (65 MG FE EQUIVALENT) 325 MG: 325 (65 FE) TABLET DELAYED RESPONSE at 08:27

## 2021-05-28 RX ADMIN — PANTOPRAZOLE SODIUM 40 MG: 40 TABLET, DELAYED RELEASE ORAL at 06:37

## 2021-05-28 RX ADMIN — METHOCARBAMOL TABLETS 750 MG: 750 TABLET, COATED ORAL at 08:27

## 2021-05-28 RX ADMIN — ACETAMINOPHEN 650 MG: 325 TABLET ORAL at 15:32

## 2021-05-28 RX ADMIN — ACETAMINOPHEN 650 MG: 325 TABLET ORAL at 20:09

## 2021-05-28 RX ADMIN — LISINOPRIL 30 MG: 20 TABLET ORAL at 08:27

## 2021-05-28 RX ADMIN — POLYETHYLENE GLYCOL 3350 17 G: 17 POWDER, FOR SOLUTION ORAL at 08:27

## 2021-05-28 RX ADMIN — CARVEDILOL 12.5 MG: 12.5 TABLET, FILM COATED ORAL at 08:27

## 2021-05-28 RX ADMIN — OXYCODONE HYDROCHLORIDE 10 MG: 5 TABLET ORAL at 20:59

## 2021-05-28 RX ADMIN — CITALOPRAM 20 MG: 20 TABLET, FILM COATED ORAL at 08:27

## 2021-05-28 RX ADMIN — METHOCARBAMOL TABLETS 750 MG: 750 TABLET, COATED ORAL at 20:09

## 2021-05-28 RX ADMIN — Medication 1000 MCG: at 08:27

## 2021-05-28 RX ADMIN — ALOGLIPTIN 12.5 MG: 12.5 TABLET, FILM COATED ORAL at 08:31

## 2021-05-28 RX ADMIN — Medication 1 TABLET: at 08:31

## 2021-05-28 RX ADMIN — ACETAMINOPHEN 650 MG: 325 TABLET ORAL at 10:03

## 2021-05-28 RX ADMIN — AMLODIPINE BESYLATE 5 MG: 5 TABLET ORAL at 08:27

## 2021-05-28 RX ADMIN — OXYCODONE HYDROCHLORIDE 5 MG: 5 TABLET ORAL at 15:32

## 2021-05-28 RX ADMIN — OXYCODONE HYDROCHLORIDE 10 MG: 5 TABLET ORAL at 04:16

## 2021-05-28 RX ADMIN — DESMOPRESSIN ACETATE 40 MG: 0.2 TABLET ORAL at 20:10

## 2021-05-28 RX ADMIN — PANTOPRAZOLE SODIUM 40 MG: 40 TABLET, DELAYED RELEASE ORAL at 16:36

## 2021-05-28 RX ADMIN — ENOXAPARIN SODIUM 40 MG: 40 INJECTION, SOLUTION INTRAVENOUS; SUBCUTANEOUS at 08:27

## 2021-05-28 RX ADMIN — FENOFIBRATE 134 MG: 54 TABLET ORAL at 06:37

## 2021-05-28 RX ADMIN — FERROUS SULFATE TAB EC 325 MG (65 MG FE EQUIVALENT) 325 MG: 325 (65 FE) TABLET DELAYED RESPONSE at 20:09

## 2021-05-28 RX ADMIN — Medication 1 TABLET: at 20:00

## 2021-05-28 RX ADMIN — ACETAMINOPHEN 650 MG: 325 TABLET ORAL at 04:16

## 2021-05-28 RX ADMIN — METHOCARBAMOL TABLETS 750 MG: 750 TABLET, COATED ORAL at 16:36

## 2021-05-28 ASSESSMENT — PAIN SCALES - GENERAL
PAINLEVEL_OUTOF10: 5
PAINLEVEL_OUTOF10: 3
PAINLEVEL_OUTOF10: 7
PAINLEVEL_OUTOF10: 7
PAINLEVEL_OUTOF10: 4
PAINLEVEL_OUTOF10: 7
PAINLEVEL_OUTOF10: 7

## 2021-05-28 ASSESSMENT — PAIN DESCRIPTION - PAIN TYPE: TYPE: ACUTE PAIN

## 2021-05-28 ASSESSMENT — PAIN DESCRIPTION - ORIENTATION: ORIENTATION: POSTERIOR

## 2021-05-28 ASSESSMENT — PAIN DESCRIPTION - LOCATION
LOCATION: BACK;NECK
LOCATION: BACK;NECK

## 2021-05-28 NOTE — PROGRESS NOTES
Neurosurgery TRICIA/Resident    Daily Progress Note   No chief complaint on file. 5/28/2021  9:36 AM    Chart reviewed. No acute events overnight. No new complaints. Vitals:    05/27/21 0946 05/27/21 1546 05/27/21 2000 05/28/21 0702   BP:  (!) 177/69 (!) 167/69 (!) 158/66   Pulse: 73 73 74 76   Resp:  18 17 20   Temp:  99 °F (37.2 °C) 98.7 °F (37.1 °C) 98.3 °F (36.8 °C)   TempSrc:  Oral Oral Oral   SpO2:  97% 96% 96%   Weight:       Height:         PE:   AOx3    Motor   L deltoid 5/5; R deltoid 5/5  L biceps 5/5; R biceps 5/5  L triceps 5/5; R triceps 5/5  L wrist extension 5/5; R wrist extension 5/5  L intrinsics 5/5; R intrinsics 5/5      L iliopsoas 5/5 , R iliopsoas 5/5  L quadriceps 5/5; R quadriceps 5/5  L Dorsiflexion 5/5; R dorsiflexion 5/5  L Plantarflexion 5/5; R plantarflexion 5/5  L EHL 5/5; R EHL 5/5    Sensation intact     Incision c/d/i      Lab Results   Component Value Date    WBC 15.1 (H) 05/22/2021    HGB 10.4 (L) 05/22/2021    HCT 33.1 (L) 05/22/2021     05/22/2021    CHOL 103 05/20/2019    TRIG 66 05/20/2019    HDL 74 03/12/2021    ALT 17 01/27/2020    AST 22 01/27/2020     05/07/2021    K 4.2 05/07/2021     05/07/2021    CREATININE 1.28 (H) 05/21/2021    BUN 64 (H) 05/07/2021    CO2 22 05/07/2021    TSH 1.43 05/20/2019    INR 1.0 04/08/2020    LABA1C 5.2 02/22/2021    LABMICR 11 08/05/2015    CRP 0.5 01/25/2018    SEDRATE 12 08/25/2019       A/P  71 y.o. female who presents with cervical myelopathy  POD #7 s/p C3-6 posterior laminectomy and fusion     Continue to encourage ambulation  Continue to encourage incentive spirometer  Continue Lovenox for DVT prophylaxis   Aspen collar to be worn while out of bed, okay to remove while eating, drinking and sleeping  [facility] for acute rehab denied, precert sent to SNF      Please contact neurosurgery with any changes in patients neurologic status.        KRISHNA Henry   9:36 AM EDT

## 2021-05-28 NOTE — PLAN OF CARE
Problem: Pain:  Goal: Pain level will decrease  Description: Pain level will decrease  Outcome: Ongoing  Goal: Control of acute pain  Description: Control of acute pain  Outcome: Ongoing  Goal: Control of chronic pain  Description: Control of chronic pain  Outcome: Ongoing     Problem: Falls - Risk of:  Goal: Will remain free from falls  Description: Will remain free from falls  Outcome: Ongoing  Goal: Absence of physical injury  Description: Absence of physical injury  Outcome: Ongoing     Problem: Nutritional:  Goal: Nutritional status will improve  Description: Nutritional status will improve  Outcome: Ongoing     Problem: Physical Regulation:  Goal: Diagnostic test results will improve  Description: Diagnostic test results will improve  Outcome: Ongoing  Goal: Will remain free from infection  Description: Will remain free from infection  Outcome: Ongoing  Goal: Ability to maintain vital signs within normal range will improve  Description: Ability to maintain vital signs within normal range will improve  Outcome: Ongoing     Problem: Respiratory:  Goal: Ability to maintain normal respiratory secretions will improve  Description: Ability to maintain normal respiratory secretions will improve  Outcome: Ongoing     Problem: Skin Integrity:  Goal: Demonstration of wound healing without infection will improve  Description: Demonstration of wound healing without infection will improve  Outcome: Ongoing  Goal: Complications related to intravenous access or infusion will be avoided or minimized  Description: Complications related to intravenous access or infusion will be avoided or minimized  Outcome: Ongoing  Goal: Will show no infection signs and symptoms  Description: Will show no infection signs and symptoms  Outcome: Ongoing  Goal: Absence of new skin breakdown  Description: Absence of new skin breakdown  Outcome: Ongoing     Problem: Musculor/Skeletal Functional Status  Goal: Highest potential functional

## 2021-05-28 NOTE — PROGRESS NOTES
Comprehensive Nutrition Assessment    Type and Reason for Visit:  Initial (LOS day 7)    Nutrition Recommendations/Plan:   -Continue diabetic diet   -Please obtain actual wt as able   -Suggest glucerna supplements BID   -Will monitor po intake, weights and wound healing     Nutrition Assessment:   Pt admitted d/t cervical myleopathy. Pt s/p C3-6 posterior laminectomy and fusion. RN reports that pt's appetite was poor in the beginning of her admission but has increased over the past few days consuming 75% of her b-fast this morning. VINCENT actual wt loss at this time as admit wt has not been obtained yet. Will add nutritional supplements to compliment po intake and to aide in wound healing progression. Malnutrition Assessment:  Malnutrition Status:  Insufficient data    Context:  Acute Illness     Findings of the 6 clinical characteristics of malnutrition:  Energy Intake:   (variable po intake x 1 wk)  Weight Loss:  Unable to assess     Body Fat Loss:  Unable to assess     Muscle Mass Loss:  Unable to assess    Fluid Accumulation:  No significant fluid accumulation     Strength:  Not Performed    Estimated Daily Nutrient Needs:  Energy (kcal):  1.2-1.3 ~> 4386-8346 kcals/d; Weight Used for Energy Requirements:  Current     Protein (g):  1.2-1.4 gm/kg ~> 62-73 ms/d; Weight Used for Protein Requirements:  Ideal        Fluid (ml/day):  2350 mLs/d OR per MD discretion; Method Used for Fluid Requirements:   (NCM)      Nutrition Related Findings:  labs/meds reviewed      Wounds:  Multiple, Open Wounds       Current Nutrition Therapies:    DIET CARB CONTROL; Carb Control: 5 carb choices (75 gms)/meal    Anthropometric Measures:  · Height: 5' 3\" (160 cm)  · Current Body Weight: 170 lb (77.1 kg) (stated)   · Ideal Body Weight: 115 lbs; % Ideal Body Weight 147.8 %   · BMI: 30.1  · BMI Categories: Obese Class 1 (BMI 30.0-34. 9)       Nutrition Diagnosis:   · Increased nutrient needs related to  (wound healing) as evidenced by wounds (Need for ONS)      Nutrition Interventions:   Food and/or Nutrient Delivery:  Continue Current Diet, Start Oral Nutrition Supplement  Nutrition Education/Counseling:  Education not indicated   Coordination of Nutrition Care:  Continue to monitor while inpatient    Goals: Set   Pt to meet % of est'd needs daily via PO       Nutrition Monitoring and Evaluation:   Food/Nutrient Intake Outcomes:  Food and Nutrient Intake, Supplement Intake  Physical Signs/Symptoms Outcomes:  Biochemical Data, Nutrition Focused Physical Findings, Skin, Weight, GI Status, Fluid Status or Edema     Discharge Planning:     Too soon to determine     Electronically signed by Jessi Logan RD, LD on 5/28/21 at 12:20 PM EDT    Contact: [phone]

## 2021-05-28 NOTE — PLAN OF CARE
Nutrition Problem #1: Increased nutrient needs  Intervention: Food and/or Nutrient Delivery: Continue Current Diet, Start Oral Nutrition Supplement  Nutritional Goals: Pt to meet % of est'd needs daily via PO

## 2021-05-28 NOTE — PROGRESS NOTES
Physical Therapy  Facility/Department: [address] ORTHO/MED SURG  Daily Treatment Note  NAME: Steve Reyes  : 1950  MRN: [id number]    Date of Service: 2021    Discharge Recommendations:  Patient would benefit from continued therapy after discharge      Assessment   Body structures, Functions, Activity limitations: Decreased functional mobility ; Decreased posture;Decreased endurance;Decreased ROM; Decreased strength;Decreased balance;Decreased safe awareness; Increased pain;Decreased coordination  Assessment: The pt continue  require assist for safety with functional mobility , she is a high fall risk and is unsafe to amb without assist at this time. she would benefit from continue therapy to address deficits. Prognosis: Good  Decision Making: Medium Complexity  PT Education: PT Role;Goals;Plan of Care; Functional Mobility Training;General Safety;Transfer Training;Gait Training;Home Exercise Program  REQUIRES PT FOLLOW UP: Yes  Activity Tolerance  Activity Tolerance: Patient limited by fatigue;Patient limited by endurance; Patient limited by pain     Patient Diagnosis(es): There were no encounter diagnoses.      has a past medical history of Allergic rhinitis, cause unspecified, Back pain, Bowel obstruction (HCC), C. difficile diarrhea, CAD (coronary artery disease), Cardiac murmur, Cellulitis, Cellulitis, Cerebral artery occlusion with cerebral infarction ([facility] [address] COVID-19, Diverticulosis of colon (without mention of hemorrhage), GERD (gastroesophageal reflux disease), GERD (gastroesophageal reflux disease), History of blood transfusion, History of CHF (congestive heart failure), History of MI (myocardial infarction), History of ovarian cyst, History of peritonitis, HTN (hypertension), Hx of blood clots, Hyperlipidemia, Intestinal or peritoneal adhesions with obstruction (postoperative) (postinfection) ([address] Kidney infection, Lateral epicondylitis  of elbow, MDRO (multiple drug resistant organisms) resistance, Muscle strain, Other abnormal glucose, PONV (postoperative nausea and vomiting), Pre-diabetes, Restless legs syndrome (RLS), Snores, 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[facility] [address] TIA (transient ischemic attack), Uses walker, Vitamin D deficiency, Wears glasses, and Wellness examination. has a past surgical history that includes Ovary removal (1970); colectomy (2068); Appendectomy (1968); Ovary removal (1971); Hysterectomy (1973); Bunionectomy (Left); sinus surgery (2004); Colonoscopy; other surgical history (08/14/2014); cyst removal (Right); Wrist fracture surgery (Left, 03/05/2019); Upper gastrointestinal endoscopy (N/A, 05/31/2019); Upper gastrointestinal endoscopy (N/A, 08/05/2019); Upper gastrointestinal endoscopy (N/A, 08/23/2019); Abdomen surgery (1976); lumbar fusion (N/A, 02/10/2020); Cardiac catheterization; Cardiac catheterization (2005); [place] tooth extraction; lumbar fusion (N/A, 06/17/2020); back surgery; cervical fusion (05/21/2021); and cervical fusion (N/A, 5/21/2021). Restrictions  Restrictions/Precautions  Restrictions/Precautions: Fall Risk  Required Braces or Orthoses?: Yes  Required Braces or Orthoses  Cervical: c-collar  Position Activity Restriction  Other position/activity restrictions: activity as tolerated, C3-C6 fusion 5/21. Aspen collar on when out of bed. OK to remove while eating, drinking, sleeping. Subjective   General  Response To Previous Treatment: Patient with no complaints from previous session.   Family / Caregiver Present: No  Subjective  Subjective: RN and pt in agreement for PT; Pt alert in chair upon arrival ;. pt very pleasant and cooperative throughout session  General Comment  Comments: c-collar donned in chair prior to mobility  Pain Screening  Patient Currently in Pain: Yes  Pain Assessment  Pain Assessment: 0-10  Pain Level: 5  Pain Location: Back;Neck  Pain Orientation: Posterior  Vital Signs  Patient Currently in Pain: Yes Orientation  Orientation  Overall Orientation Status: Within Normal Limits  Cognition      Objective   Bed mobility  Supine to Sit: Moderate assistance  Sit to Supine: Moderate assistance  Scooting: Moderate assistance  Transfers  Sit to Stand: Minimal Assistance  Stand to sit: Minimal Assistance  Ambulation  Ambulation?: Yes  Ambulation 1  Surface: level tile  Device: Rolling Walker  Assistance: Minimal assistance  Quality of Gait: .  Shuffling, nearly festinating gait. Poor positioning between her body and the walker. Gait Deviations: Slow Alicia;Staggers; Shuffles;Decreased step length;Decreased step height  Distance: amb 60 ft with a RW x min assist  Comments: Max cueing for posture ,safety and RW management. Pt unsafe to amb without assist at this time  Ambulation 2  Gait Deviations: Decreased step height;Decreased step length; Increased KAREEM; Slow Alicia;Deviated path;Shuffles  Distance: amb 60 ft with a RW x min assist   AROM B LAQ's, hip abd/add, heel /toe raises, marches x 10  Balance  Posture: Good  Sitting - Static: Good  Sitting - Dynamic: Fair  Standing - Static: Fair  Standing - Dynamic: Fair     G-Code     OutComes Score    AM-PAC Score    Goals  Short term goals  Time Frame for Short term goals: 15  Short term goal 1: Pt to perform bed mobility CGA  Short term goal 2: Pt to demonstrate functional transfers SBA  Short term goal 3: Ambulate 100ft w/ RW SBA  Short term goal 4: Demonstrate standing dynamic balance of good - with AD to decrease fall risk with functional mobility  Patient Goals   Patient goals :  To get stronger    Plan    Plan  Times per week: 6-7x/week  Current Treatment Recommendations: Strengthening, Transfer Training, Endurance Training, Patient/Caregiver Education & Training, ROM, Equipment Evaluation, Education, & procurement, Balance Training, Gait Training, Home Exercise Program, Functional Mobility Training, Stair training, Safety Education & Training  Safety Devices  Type of devices: Patient at risk for falls, Call light within reach, Gait belt, Nurse notified, Left in chair, Chair alarm in place, All fall risk precautions in place  Restraints  Initially in place: No     Therapy Time   Individual Concurrent Group Co-treatment   Time In 0745         Time Out 0808         Minutes 410 Ashwin Baeza, PT

## 2021-05-29 LAB
GLUCOSE BLD-MCNC: 107 MG/DL (ref 65–105)
GLUCOSE BLD-MCNC: 137 MG/DL (ref 65–105)

## 2021-05-29 PROCEDURE — 82947 ASSAY GLUCOSE BLOOD QUANT: CPT

## 2021-05-29 PROCEDURE — 97110 THERAPEUTIC EXERCISES: CPT

## 2021-05-29 PROCEDURE — 97116 GAIT TRAINING THERAPY: CPT

## 2021-05-29 PROCEDURE — 6370000000 HC RX 637 (ALT 250 FOR IP): Performed by: NURSE PRACTITIONER

## 2021-05-29 PROCEDURE — 1200000000 HC SEMI PRIVATE

## 2021-05-29 PROCEDURE — 6360000002 HC RX W HCPCS: Performed by: PHYSICIAN ASSISTANT

## 2021-05-29 RX ADMIN — ACETAMINOPHEN 650 MG: 325 TABLET ORAL at 03:34

## 2021-05-29 RX ADMIN — CITALOPRAM 20 MG: 20 TABLET, FILM COATED ORAL at 11:12

## 2021-05-29 RX ADMIN — CARVEDILOL 12.5 MG: 12.5 TABLET, FILM COATED ORAL at 11:05

## 2021-05-29 RX ADMIN — OXYCODONE HYDROCHLORIDE 10 MG: 5 TABLET ORAL at 21:56

## 2021-05-29 RX ADMIN — FENOFIBRATE 134 MG: 54 TABLET ORAL at 06:49

## 2021-05-29 RX ADMIN — PANTOPRAZOLE SODIUM 40 MG: 40 TABLET, DELAYED RELEASE ORAL at 06:49

## 2021-05-29 RX ADMIN — FERROUS SULFATE TAB EC 325 MG (65 MG FE EQUIVALENT) 325 MG: 325 (65 FE) TABLET DELAYED RESPONSE at 11:12

## 2021-05-29 RX ADMIN — AMLODIPINE BESYLATE 5 MG: 5 TABLET ORAL at 11:12

## 2021-05-29 RX ADMIN — DESMOPRESSIN ACETATE 40 MG: 0.2 TABLET ORAL at 21:52

## 2021-05-29 RX ADMIN — FERROUS SULFATE TAB EC 325 MG (65 MG FE EQUIVALENT) 325 MG: 325 (65 FE) TABLET DELAYED RESPONSE at 21:52

## 2021-05-29 RX ADMIN — LISINOPRIL 30 MG: 20 TABLET ORAL at 11:09

## 2021-05-29 RX ADMIN — METHOCARBAMOL TABLETS 750 MG: 750 TABLET, COATED ORAL at 17:20

## 2021-05-29 RX ADMIN — ALOGLIPTIN 12.5 MG: 12.5 TABLET, FILM COATED ORAL at 11:04

## 2021-05-29 RX ADMIN — OXYCODONE HYDROCHLORIDE 10 MG: 5 TABLET ORAL at 13:57

## 2021-05-29 RX ADMIN — ACETAMINOPHEN 650 MG: 325 TABLET ORAL at 17:20

## 2021-05-29 RX ADMIN — OXYCODONE HYDROCHLORIDE 10 MG: 5 TABLET ORAL at 03:33

## 2021-05-29 RX ADMIN — Medication 1 TABLET: at 11:13

## 2021-05-29 RX ADMIN — Medication 1000 MCG: at 11:11

## 2021-05-29 RX ADMIN — ACETAMINOPHEN 650 MG: 325 TABLET ORAL at 09:36

## 2021-05-29 RX ADMIN — METHOCARBAMOL TABLETS 750 MG: 750 TABLET, COATED ORAL at 11:11

## 2021-05-29 RX ADMIN — PANTOPRAZOLE SODIUM 40 MG: 40 TABLET, DELAYED RELEASE ORAL at 17:20

## 2021-05-29 RX ADMIN — ENOXAPARIN SODIUM 40 MG: 40 INJECTION, SOLUTION INTRAVENOUS; SUBCUTANEOUS at 11:13

## 2021-05-29 RX ADMIN — CARVEDILOL 12.5 MG: 12.5 TABLET, FILM COATED ORAL at 21:52

## 2021-05-29 RX ADMIN — OXYCODONE HYDROCHLORIDE 10 MG: 5 TABLET ORAL at 09:36

## 2021-05-29 RX ADMIN — Medication 1 TABLET: at 21:52

## 2021-05-29 RX ADMIN — OXYCODONE HYDROCHLORIDE 10 MG: 5 TABLET ORAL at 18:14

## 2021-05-29 ASSESSMENT — PAIN SCALES - GENERAL
PAINLEVEL_OUTOF10: 7
PAINLEVEL_OUTOF10: 7
PAINLEVEL_OUTOF10: 10
PAINLEVEL_OUTOF10: 9
PAINLEVEL_OUTOF10: 5
PAINLEVEL_OUTOF10: 2
PAINLEVEL_OUTOF10: 2
PAINLEVEL_OUTOF10: 5
PAINLEVEL_OUTOF10: 3
PAINLEVEL_OUTOF10: 0

## 2021-05-29 NOTE — PROGRESS NOTES
Physical Therapy  Facility/Department: [address] ORTHO/MED SURG  Daily Treatment Note  NAME: Vanessa Leroy  : 1950  MRN: [id number]    Date of Service: 2021    Discharge Recommendations:  Patient would benefit from continued therapy after discharge        Assessment   Assessment: Increased AMB distance: 115 feet x1, RW, MIN assist for safety secondary to poor gait mechanics. Pt is a fall risk secondary to left LE/UE weakness. Decreased step length and decreased step height with AMB. PT Education: PT Role;Goals;Plan of Care; Functional Mobility Training;General Safety;Transfer Training;Gait Training;Home Exercise Program  Activity Tolerance  Activity Tolerance: Patient Tolerated treatment well     Patient Diagnosis(es): There were no encounter diagnoses. has a past medical history of Allergic rhinitis, cause unspecified, Back pain, Bowel obstruction (HCC), C. difficile diarrhea, CAD (coronary artery disease), Cardiac murmur, Cellulitis, Cellulitis, Cerebral artery occlusion with cerebral infarction ([address] COVID-19, Diverticulosis of colon (without mention of hemorrhage), GERD (gastroesophageal reflux disease), GERD (gastroesophageal reflux disease), History of blood transfusion, History of CHF (congestive heart failure), History of MI (myocardial infarction), History of ovarian cyst, History of peritonitis, HTN (hypertension), Hx of blood clots, Hyperlipidemia, Intestinal or peritoneal adhesions with obstruction (postoperative) (postinfection) ([address] Kidney infection, Lateral epicondylitis  of elbow, MDRO (multiple drug resistant organisms) resistance, Muscle strain, Other abnormal glucose, PONV (postoperative nausea and vomiting), Pre-diabetes, Restless legs syndrome (RLS), Snores, 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[address] TIA (transient ischemic attack), Uses walker, Vitamin D deficiency, Wears glasses, and Wellness examination.    has a past surgical history that includes Ovary removal (); colectomy (1969); Appendectomy (1968); Ovary removal (1971); Hysterectomy (1973); Bunionectomy (Left); sinus surgery (2004); Colonoscopy; other surgical history (08/14/2014); cyst removal (Right); Wrist fracture surgery (Left, 03/05/2019); Upper gastrointestinal endoscopy (N/A, 05/31/2019); Upper gastrointestinal endoscopy (N/A, 08/05/2019); Upper gastrointestinal endoscopy (N/A, 08/23/2019); Abdomen surgery (1976); lumbar fusion (N/A, 02/10/2020); Cardiac catheterization; Cardiac catheterization (2005); [place] tooth extraction; lumbar fusion (N/A, 06/17/2020); back surgery; cervical fusion (05/21/2021); and cervical fusion (N/A, 5/21/2021). Restrictions  Restrictions/Precautions  Restrictions/Precautions: Fall Risk  Required Braces or Orthoses?: Yes  Required Braces or Orthoses  Cervical: c-collar  Position Activity Restriction  Other position/activity restrictions: activity as tolerated, C3-C6 fusion 5/21. Aspen collar on when out of bed. OK to remove while eating, drinking, sleeping. Subjective   General  Chart Reviewed: Yes  Response To Previous Treatment: Patient with no complaints from previous session. Subjective  Subjective: RN and pt in agreement for PT; Pt alert in be,. pt very pleasant and cooperative throughout session  General Comment  Comments: c-collar donned in bed prior to mobility          Orientation     Cognition      Objective   Bed mobility  Rolling to Right: Minimal assistance  Supine to Sit: Minimal assistance  Scooting: Moderate assistance  Transfers  Sit to Stand: Minimal Assistance  Stand to sit: Contact guard assistance  Ambulation 1  Surface: level tile  Device: Rolling Walker  Assistance: Minimal assistance  Quality of Gait: .  Shuffling, nearly festinating gait. Poor positioning between her body and the walker. Gait Deviations: Slow Alicia;Staggers; Shuffles;Decreased step length;Decreased step height  Distance: 115 feet x1  Comments: V/Cs for safety, positioning in walker, step

## 2021-05-29 NOTE — PROGRESS NOTES
Neurosurgery Resident  Daily Progress Note    5/29/2021  7:08 AM    Chart reviewed. No acute events overnight. No new complaints. Afebrile, VSS. Vitals:    05/27/21 2000 05/28/21 0702 05/28/21 1217 05/28/21 1928   BP: (!) 167/69 (!) 158/66  (!) 159/60   Pulse: 74 76  75   Resp: 17 20  18   Temp: 98.7 °F (37.1 °C) 98.3 °F (36.8 °C)  98.3 °F (36.8 °C)   TempSrc: Oral Oral  Oral   SpO2: 96% 96%     Weight:       Height:   5' 3\" (1.6 m)        PE:   Gen: AOx3, NAD  Resp: equal air entry bilaterally with no audible wheezing or crackles  Abd: soft, no rigidity, no guarding, ND/NT  Neuro:  L delt 5/5, R delt 5/5  L biceps 5/5, R biceps 5/5  L triceps 5/5, R biceps 5/5  L intrinsics 5/5, R intrinsics 5/5    L psoas 5/5, R psoas 5/5  L quads 5/5, R quads 5/5  L ankle DF 5/5, R ankle DF 5/5  L ankle PF 5/5, R ankle PF 5/5    Sensation intact to light touch bilaterally. Incision to cervical region, c/d/i. Lab Results   Component Value Date    WBC 15.1 (H) 05/22/2021    HGB 10.4 (L) 05/22/2021    HCT 33.1 (L) 05/22/2021     05/22/2021    CHOL 103 05/20/2019    TRIG 66 05/20/2019    HDL 74 03/12/2021    ALT 17 01/27/2020    AST 22 01/27/2020     05/07/2021    K 4.2 05/07/2021     05/07/2021    CREATININE 1.28 (H) 05/21/2021    BUN 64 (H) 05/07/2021    CO2 22 05/07/2021    TSH 1.43 05/20/2019    INR 1.0 04/08/2020    LABA1C 5.2 02/22/2021    LABMICR 11 08/05/2015       80 y.o. female who presents with cervical myelopathy s/p laminectomy and instrumented fusion C3-C6, POD#8       - Continue ambulation and activities as tolerated. Ambulated 80'  with PT.   - Still pending SNF placement. Per family and nursing unsafe for  discharge home due to deconditioning in  from recent  cardiac surgery.   -C collar when out of bed; okay to remove when  sleeping/hygiene/eating & drinking.   - Lovenox for DVT ppx.   - Dispo: ok for discharge when SNF approved.     Patient care will be discussed with attending, will reevaluate patient along with attending. Please contact neurosurgery with any changes in patients neurologic status.       Kev Ledbetter, DO 7:08 AM

## 2021-05-30 VITALS
WEIGHT: 170 LBS | BODY MASS INDEX: 30.12 KG/M2 | TEMPERATURE: 100 F | RESPIRATION RATE: 18 BRPM | OXYGEN SATURATION: 96 % | SYSTOLIC BLOOD PRESSURE: 161 MMHG | DIASTOLIC BLOOD PRESSURE: 55 MMHG | HEART RATE: 76 BPM | HEIGHT: 63 IN

## 2021-05-30 LAB — GLUCOSE BLD-MCNC: 100 MG/DL (ref 65–105)

## 2021-05-30 PROCEDURE — 97535 SELF CARE MNGMENT TRAINING: CPT

## 2021-05-30 PROCEDURE — 97116 GAIT TRAINING THERAPY: CPT

## 2021-05-30 PROCEDURE — APPSS15 APP SPLIT SHARED TIME 0-15 MINUTES: Performed by: NURSE PRACTITIONER

## 2021-05-30 PROCEDURE — 6370000000 HC RX 637 (ALT 250 FOR IP): Performed by: NURSE PRACTITIONER

## 2021-05-30 PROCEDURE — 6360000002 HC RX W HCPCS: Performed by: PHYSICIAN ASSISTANT

## 2021-05-30 PROCEDURE — 99024 POSTOP FOLLOW-UP VISIT: CPT | Performed by: NEUROLOGICAL SURGERY

## 2021-05-30 PROCEDURE — 82947 ASSAY GLUCOSE BLOOD QUANT: CPT

## 2021-05-30 RX ORDER — DOCUSATE SODIUM 100 MG/1
100 CAPSULE, LIQUID FILLED ORAL 2 TIMES DAILY PRN
Qty: 60 CAPSULE | Refills: 1 | Status: SHIPPED | OUTPATIENT
Start: 2021-05-30 | End: 2022-06-05

## 2021-05-30 RX ORDER — OXYCODONE HYDROCHLORIDE AND ACETAMINOPHEN 5; 325 MG/1; MG/1
1-2 TABLET ORAL EVERY 6 HOURS PRN
Qty: 42 TABLET | Refills: 0 | Status: SHIPPED | OUTPATIENT
Start: 2021-05-30 | End: 2021-06-06

## 2021-05-30 RX ADMIN — FERROUS SULFATE TAB EC 325 MG (65 MG FE EQUIVALENT) 325 MG: 325 (65 FE) TABLET DELAYED RESPONSE at 20:58

## 2021-05-30 RX ADMIN — DESMOPRESSIN ACETATE 40 MG: 0.2 TABLET ORAL at 20:58

## 2021-05-30 RX ADMIN — ACETAMINOPHEN 650 MG: 325 TABLET ORAL at 01:24

## 2021-05-30 RX ADMIN — ENOXAPARIN SODIUM 40 MG: 40 INJECTION, SOLUTION INTRAVENOUS; SUBCUTANEOUS at 08:36

## 2021-05-30 RX ADMIN — OXYCODONE HYDROCHLORIDE 10 MG: 5 TABLET ORAL at 06:51

## 2021-05-30 RX ADMIN — ACETAMINOPHEN 650 MG: 325 TABLET ORAL at 20:22

## 2021-05-30 RX ADMIN — ACETAMINOPHEN 650 MG: 325 TABLET ORAL at 06:50

## 2021-05-30 RX ADMIN — LISINOPRIL 30 MG: 20 TABLET ORAL at 08:36

## 2021-05-30 RX ADMIN — ACETAMINOPHEN 650 MG: 325 TABLET ORAL at 12:20

## 2021-05-30 RX ADMIN — PANTOPRAZOLE SODIUM 40 MG: 40 TABLET, DELAYED RELEASE ORAL at 20:59

## 2021-05-30 RX ADMIN — CARVEDILOL 12.5 MG: 12.5 TABLET, FILM COATED ORAL at 08:36

## 2021-05-30 RX ADMIN — PANTOPRAZOLE SODIUM 40 MG: 40 TABLET, DELAYED RELEASE ORAL at 06:51

## 2021-05-30 RX ADMIN — OXYCODONE HYDROCHLORIDE 10 MG: 5 TABLET ORAL at 20:22

## 2021-05-30 RX ADMIN — OXYCODONE HYDROCHLORIDE 5 MG: 5 TABLET ORAL at 14:55

## 2021-05-30 RX ADMIN — CARVEDILOL 12.5 MG: 12.5 TABLET, FILM COATED ORAL at 20:58

## 2021-05-30 RX ADMIN — FENOFIBRATE 134 MG: 54 TABLET ORAL at 06:51

## 2021-05-30 RX ADMIN — OXYCODONE HYDROCHLORIDE 10 MG: 5 TABLET ORAL at 03:10

## 2021-05-30 RX ADMIN — ALOGLIPTIN 12.5 MG: 12.5 TABLET, FILM COATED ORAL at 12:20

## 2021-05-30 RX ADMIN — FERROUS SULFATE TAB EC 325 MG (65 MG FE EQUIVALENT) 325 MG: 325 (65 FE) TABLET DELAYED RESPONSE at 08:36

## 2021-05-30 RX ADMIN — AMLODIPINE BESYLATE 5 MG: 5 TABLET ORAL at 08:36

## 2021-05-30 RX ADMIN — METHOCARBAMOL TABLETS 750 MG: 750 TABLET, COATED ORAL at 01:24

## 2021-05-30 RX ADMIN — Medication 1000 MCG: at 08:36

## 2021-05-30 RX ADMIN — CITALOPRAM 20 MG: 20 TABLET, FILM COATED ORAL at 08:36

## 2021-05-30 ASSESSMENT — PAIN SCALES - GENERAL
PAINLEVEL_OUTOF10: 3
PAINLEVEL_OUTOF10: 4
PAINLEVEL_OUTOF10: 5
PAINLEVEL_OUTOF10: 7
PAINLEVEL_OUTOF10: 7
PAINLEVEL_OUTOF10: 4
PAINLEVEL_OUTOF10: 7
PAINLEVEL_OUTOF10: 5

## 2021-05-30 ASSESSMENT — PAIN DESCRIPTION - LOCATION: LOCATION: NECK

## 2021-05-30 ASSESSMENT — PAIN DESCRIPTION - PAIN TYPE: TYPE: SURGICAL PAIN

## 2021-05-30 NOTE — CARE COORDINATION
Transitional planning:  Received call back from Loretta Watson from [address] [place] [address] that precert authorized. Nurse report number is [phone], Room [facility] 60 Faxed demographic, medical necessity and transportation form to [facility] flight for transport to [facility].   PS Dr. Jena Dacosta asking for [address] signature. [facility] states I need to call [place] for transport. Called [phone], left  for [facility] call [facility] back. 1222 Marly Baeza from [facility] back at [phone] and notified of transport at 2130 hrs. She voiced she would let the staff know. 1 ANNALEE signed.  HENS completed with Dr. Kolby Rodas license per his approval. no

## 2021-05-30 NOTE — PROGRESS NOTES
Physical Therapy  Facility/Department: [address] ORTHO/MED SURG  Daily Treatment Note  NAME: Abdirashid Severe  : 1950  MRN: [id number]    Date of Service: 2021    Discharge Recommendations:  Patient would benefit from continued therapy after discharge        Assessment   Assessment: AMB distance: 115 feet x1, RW, MIN assist for safety secondary to poor gait mechanics. Pt is a fall risk secondary to left LE/UE weakness. Increased left LE shuffling, decreased step length and height with fatigue. Min assist with turning for walker management and safety. PT Education: PT Role;Goals;Plan of Care; Functional Mobility Training;General Safety;Transfer Training;Gait Training;Home Exercise Program  Activity Tolerance  Activity Tolerance: Patient limited by fatigue  Activity Tolerance: Increased left LE shuffling, decreased step length and height with fatigue. Patient Diagnosis(es): There were no encounter diagnoses.      has a past medical history of Allergic rhinitis, cause unspecified, Back pain, Bowel obstruction (HCC), C. difficile diarrhea, CAD (coronary artery disease), Cardiac murmur, Cellulitis, Cellulitis, Cerebral artery occlusion with cerebral infarction ([address] COVID-19, Diverticulosis of colon (without mention of hemorrhage), GERD (gastroesophageal reflux disease), GERD (gastroesophageal reflux disease), History of blood transfusion, History of CHF (congestive heart failure), History of MI (myocardial infarction), History of ovarian cyst, History of peritonitis, HTN (hypertension), Hx of blood clots, Hyperlipidemia, Intestinal or peritoneal adhesions with obstruction (postoperative) (postinfection) ([address] Kidney infection, Lateral epicondylitis  of elbow, MDRO (multiple drug resistant organisms) resistance, Muscle strain, Other abnormal glucose, PONV (postoperative nausea and vomiting), Pre-diabetes, Restless legs syndrome (RLS), Snores, 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[address] TIA (transient ischemic attack), Uses walker, Vitamin D deficiency, Wears glasses, and Wellness examination. has a past surgical history that includes Ovary removal (1970); colectomy (4050); Appendectomy (1968); Ovary removal (1971); Hysterectomy (1973); Bunionectomy (Left); sinus surgery (2004); Colonoscopy; other surgical history (08/14/2014); cyst removal (Right); Wrist fracture surgery (Left, 03/05/2019); Upper gastrointestinal endoscopy (N/A, 05/31/2019); Upper gastrointestinal endoscopy (N/A, 08/05/2019); Upper gastrointestinal endoscopy (N/A, 08/23/2019); Abdomen surgery (1976); lumbar fusion (N/A, 02/10/2020); Cardiac catheterization; Cardiac catheterization (2005); [place] tooth extraction; lumbar fusion (N/A, 06/17/2020); back surgery; cervical fusion (05/21/2021); and cervical fusion (N/A, 5/21/2021). Restrictions  Restrictions/Precautions  Restrictions/Precautions: Fall Risk  Required Braces or Orthoses?: Yes  Required Braces or Orthoses  Cervical: c-collar  Position Activity Restriction  Other position/activity restrictions: activity as tolerated, C3-C6 fusion 5/21. Aspen collar on when out of bed. OK to remove while eating, drinking, sleeping. Subjective   General  Response To Previous Treatment: Patient with no complaints from previous session. Subjective  Subjective: RN and pt in agreement for PT; Pt alert in be,. pt very pleasant and cooperative throughout session  General Comment  Comments: c-collar donned in bed prior to mobility          Orientation  Orientation  Overall Orientation Status: Within Normal Limits  Cognition      Objective   Bed mobility  Rolling to Right: Minimal assistance  Supine to Sit: Minimal assistance  Sit to Supine: Moderate assistance  Scooting: Minimal assistance  Transfers  Sit to Stand:  Moderate Assistance  Stand to sit: Minimal Assistance  Ambulation 1  Surface: level tile  Device: Rolling Walker  Assistance: Minimal assistance  Quality of Gait: .  Shuffling, nearly festinating gait. Poor positioning between her body and the walker. Increased left LE shuffling, decreased step length and height with fatigue. Gait Deviations: Slow Alicia;Staggers; Shuffles;Decreased step length;Decreased step height  Distance: 115 feet x1  Comments: V/Cs for safety, positioning in walker, step length and step height. Goals  Short term goals  Time Frame for Short term goals: 15  Short term goal 1: Pt to perform bed mobility CGA  Short term goal 2: Pt to demonstrate functional transfers SBA  Short term goal 3: Ambulate 100ft w/ RW SBA  Short term goal 4: Demonstrate standing dynamic balance of good - with AD to decrease fall risk with functional mobility  Patient Goals   Patient goals :  To get stronger    Plan    Plan  Times per week: 6-7x/week  Current Treatment Recommendations: Strengthening, Transfer Training, Endurance Training, Patient/Caregiver Education & Training, ROM, Equipment Evaluation, Education, & procurement, Balance Training, Gait Training, Home Exercise Program, Functional Mobility Training, Stair training, Safety Education & Training  Safety Devices  Type of devices: Patient at risk for falls, Call light within reach, Gait belt, Nurse notified, All fall risk precautions in place, Left in bed  Restraints  Initially in place: No     Therapy Time   Individual Concurrent Group Co-treatment   Time In 0812         Time Out 0829         Minutes [address] [place] [address]

## 2021-05-30 NOTE — PROGRESS NOTES
Occupational Therapy  Facility/Department: [address] ORTHO/MED SURG  Daily Treatment Note  NAME: Gisella Venegas  : 1950  MRN: [id number]    Date of Service: 2021    Discharge Recommendations:  Patient would benefit from continued therapy after discharge in order to increase pt balance, strength and independence. Pt unsafe to return to prior living arrangements at increased risk for falls. Assessment   Performance deficits / Impairments: Decreased functional mobility ; Decreased ADL status; Decreased strength;Decreased endurance;Decreased balance;Decreased high-level IADLs;Decreased ROM  Treatment Diagnosis: cervical fusion  Prognosis: Good  OT Education: OT Role;Transfer Training;Precautions  Patient Education: purpose of OT; proper hand and foot placement; walker management; c-collar precautions  Barriers to Learning: pt demo F carry over  REQUIRES OT FOLLOW UP: Yes  Activity Tolerance  Activity Tolerance: Patient Tolerated treatment well  Safety Devices  Safety Devices in place: Yes  Type of devices: Gait belt;Patient at risk for falls; Left in bed;Call light within reach; Bed alarm in place;Nurse notified  Restraints  Initially in place: No         Patient Diagnosis(es): The encounter diagnosis was 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[address]       has a past medical history of Allergic rhinitis, cause unspecified, Back pain, Bowel obstruction (HCC), C. difficile diarrhea, CAD (coronary artery disease), Cardiac murmur, Cellulitis, Cellulitis, Cerebral artery occlusion with cerebral infarction ([address] COVID-19, Diverticulosis of colon (without mention of hemorrhage), GERD (gastroesophageal reflux disease), GERD (gastroesophageal reflux disease), History of blood transfusion, History of CHF (congestive heart failure), History of MI (myocardial infarction), History of ovarian cyst, History of peritonitis, HTN (hypertension), Hx of blood clots, Hyperlipidemia, Intestinal or peritoneal adhesions with obstruction (postoperative) (postinfection) ([facility] Kidney infection, Lateral epicondylitis  of elbow, MDRO (multiple drug resistant organisms) resistance, Muscle strain, Other abnormal glucose, PONV (postoperative nausea and vomiting), Pre-diabetes, Restless legs syndrome (RLS), Snores, 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[address] TIA (transient ischemic attack), Uses walker, Vitamin D deficiency, Wears glasses, and Wellness examination. has a past surgical history that includes Ovary removal (1970); colectomy (7390); Appendectomy (1968); Ovary removal (1971); Hysterectomy (1973); Bunionectomy (Left); sinus surgery (2004); Colonoscopy; other surgical history (08/14/2014); cyst removal (Right); Wrist fracture surgery (Left, 03/05/2019); Upper gastrointestinal endoscopy (N/A, 05/31/2019); Upper gastrointestinal endoscopy (N/A, 08/05/2019); Upper gastrointestinal endoscopy (N/A, 08/23/2019); Abdomen surgery (1976); lumbar fusion (N/A, 02/10/2020); Cardiac catheterization; Cardiac catheterization (2005); [place] tooth extraction; lumbar fusion (N/A, 06/17/2020); back surgery; cervical fusion (05/21/2021); and cervical fusion (N/A, 5/21/2021). Restrictions  Restrictions/Precautions  Restrictions/Precautions: Fall Risk  Required Braces or Orthoses?: Yes  Required Braces or Orthoses  Cervical: c-collar  Position Activity Restriction  Other position/activity restrictions: activity as tolerated, C3-C6 fusion 5/21. Aspen collar on when out of bed. OK to remove while eating, drinking, sleeping.   Subjective   General  Chart Reviewed: Yes  Patient assessed for rehabilitation services?: Yes  Family / Caregiver Present: No  Diagnosis: cervical fusion  General Comment  Comments: RN and pt agreeable to therapy  Vital Signs  Patient Currently in Pain: Denies   Orientation  Orientation  Overall Orientation Status: Within Functional Limits  Objective    ADL  Grooming: Stand by assistance;Setup (oral care and hand hygiene completed standing at sink with RW utilizing 0-1 hand support for balance maintaince)  UE Dressing: Maximum assistance;Setup (to doff/don c-collar)  LE Dressing: Minimal assistance;Setup;Verbal cueing; Increased time to complete (to doff/don slide on shoes req Min A to doff)  Toileting: Setup; Increased time to complete;Minimal assistance (personal hygiene completed seated on toilet req Min A for clothing management with gown)  Additional Comments: Pt supine in bed at start of session pleasant and cooperative. UB dressing to doff/don c-collar completed supine in bed, c-collar donned at start of session and doffed at session end. Grooming tasks completed standing at sink pt req 2-3 v/c for proper walker placement. Toilting completed seated pt req Min A for gown management with personal hygiene completed seated pt utilizing 1-2 hand support while seated on toilet. Throughout session pt limited per decreased balance, strength and increased fatigue. Balance  Sitting Balance: Stand by assistance (pt tolerated approx 10 min seated unsupported at EOB and toilet utilizing 1-2 hand support for balance)  Standing Balance: Stand by assistance  Standing Balance  Time: Pt tolerated approx 8 min  Activity: dynmaic standing during ADLs at sink  Comment: utilizing RW with 0-1 hand support  Functional Mobility  Functional - Mobility Device: Rolling Walker  Activity: To/from bathroom  Assist Level: Minimal assistance  Functional Mobility Comments: req assist with walker management and 2-3 v/c sec to pt shuffling gait  Bed mobility  Supine to Sit: Contact guard assistance  Sit to Supine: Moderate assistance (assist with BLE)  Scooting: Minimal assistance  Comment: utilizing bed rails     Cognition  Overall Cognitive Status: WFL     Pt pleasant and cooperative throughout session. Pt limited per increased fatigue, increased discomfort and decreased balance. At session end pt supine in bed with call light in reach and bed alarm on. Plan   Plan  Times per week: 3-5x/wk  Cont POC  Goals  Short term goals  Time Frame for Short term goals: pt will, by discharge  Short term goal 1: complete LB ADLs and toileting tasks with min A, set up and Ae, as needed  Short term goal 2: complete UB ADLs and grooming tasks with mod I and set up  Short term goal 3: increase activity tolerance to 25+ minutes in order to participate in daily tasks  Short term goal 4: dem CGA during functional transfers/functional mobility with LRD and no buckling/LOB  Short term goal 5: dem ~5 minutes static/dynamic standing balance with CGA and LRD in order to complete functional tasks  Short term goal 6: participate in ~10 minutes meaningful activity in order to increase L UE coordination       Therapy Time   Individual Concurrent Group Co-treatment   Time In 1415         Time Out 1438         Minutes 23         Timed Code Treatment Minutes: JONI Polk/CÉSAR

## 2021-05-30 NOTE — PROGRESS NOTES
Neurosurgery Resident  Daily Progress Note    5/30/2021  6:23 AM    Chart reviewed. No acute events overnight. No new complaints. No chest pain, SOB, abdominal pain. Patient has been able to ambulate without difficulty. Pain is well controlled with current analgesic regimen. Vital signs stable. Patient was also afebrile. Vitals:    05/29/21 0715 05/29/21 0840 05/29/21 1105 05/29/21 2000   BP: (!) 168/49 (!) 163/59 (!) 173/53 (!) 148/68   Pulse: 60 65 66 68   Resp: 18 17  18   Temp: 98.3 °F (36.8 °C) 98.3 °F (36.8 °C)  98.5 °F (36.9 °C)   TempSrc: Oral Oral  Oral   SpO2: 98% 98%  95%   Weight:       Height:           PE:   Gen: AOx3, NAD, sitting up in bed preparing to eat breakfast  Resp: equal air entry bilaterally with no audible wheezing or crackles  Abd: soft, no rigidity, no guarding, ND/NT  Neuro:  L delt 5/5, R delt 5/5  L biceps 5/5, R biceps 5/5  L triceps 5/5, R biceps 5/5  L intrinsics 5/5, R intrinsics 5/5    Sensation intact to light touch bilateral upper and lower extremities. Wound: posterior cervical incision healing well, c/d/i      Lab Results   Component Value Date    WBC 15.1 (H) 05/22/2021    HGB 10.4 (L) 05/22/2021    HCT 33.1 (L) 05/22/2021     05/22/2021    CHOL 103 05/20/2019    TRIG 66 05/20/2019    HDL 74 03/12/2021    ALT 17 01/27/2020    AST 22 01/27/2020     05/07/2021    K 4.2 05/07/2021     05/07/2021    CREATININE 1.28 (H) 05/21/2021    BUN 64 (H) 05/07/2021    CO2 22 05/07/2021    TSH 1.43 05/20/2019    INR 1.0 04/08/2020    LABA1C 5.2 02/22/2021    LABMICR 11 08/05/2015       80 y.o. female who presents with cervical myelopathy s/p laminectomy and instrumented fusion C3-C6, POD#9.          - Continue ambulation and activities as tolerated. PT/OT. - Still pending SNF placement.  Per family and nursing unsafe for discharge home due  to deconditioning in  from recent cardiac surgery.              -C collar when out of bed; okay to remove when sleeping/hygiene/eating & drinking.              - Lovenox for DVT ppx.  - Dispo: ok for discharge when SNF approved. Patient care will be discussed with attending, will reevaluate patient along with attending. Please contact neurosurgery with any changes in patients neurologic status.       Lupe Cole, DO 6:23 AM

## 2021-05-31 NOTE — DISCHARGE SUMMARY
Department of Neurosurgery                                            Discharge Summary       PATIENT NAME: Concepcion Alcala  YOB: 1950  MEDICAL RECORD NO. [id number]  DATE: 5/31/2021  PRIMARY CARE PHYSICIAN: Cintia Mccormack MD  DISCHARGE DATE:  5/30/2021 11:15 PM  DISCHARGE DIAGNOSIS:   Patient Active Problem List   Diagnosis Code    Other specified disorders of rotator cuff syndrome of shoulder and allied disorders M75.100    Diverticulosis of large intestine K57.30    Intestinal or peritoneal adhesions with obstruction (postoperative) (postinfection) ([facility] K56.50    Restless legs syndrome (RLS) G25.81    GERD (gastroesophageal reflux disease) K21.9    Essential hypertension I10    Mixed hyperlipidemia E78.2    Other abnormal glucose R73.09    Atherosclerosis I70.90    Allergic rhinitis J30.9    Vitamin D deficiency E55.9    Depression F32.9    Degenerative joint disease (DJD) of hip M16.9    Peripheral edema R60.9    Injury of foot, left L76.229W    Facial droop R29.810    CVA (cerebral vascular accident) ([facility] [facility] I63.9    Bradycardia R00.1    Ataxia R27.0    Aphasia R47.01    TIA (transient ischemic attack) G45.9    Need for prophylactic vaccination and inoculation against cholera alone Z23    Osteopenia M85.80    Lumbago M54.5    Meralgia paresthetica of right side G57.11    Lumbar degenerative disc disease M51.36    Spondylosis of lumbar region without myelopathy or radiculopathy M47.816    Blood poisoning [id number]    Cellulitis of left lower extremity L03. 80    Encounter for medication monitoring Z51.81    Deep vein thrombosis (DVT) of right lower extremity (HCC) I82.401    Chronic deep vein thrombosis (DVT) of proximal vein of both lower extremities (HCC) I82.5Y3    Chronic diastolic heart failure (HCC) I50.32    Neurogenic claudication due to lumbar spinal stenosis M48.062    Sacroiliitis (HCC) M46.1    Stage 3 chronic kidney disease N18.30    Type 2 diabetes mellitus with circulatory disorder, without long-term current use of insulin ([facility]) E11.59    Chronic deep vein thrombosis (DVT) of popliteal vein of right lower extremity ([facility]) I82.531    Closed fracture of left wrist S62.102A    B12 deficiency E53.8    Iron deficiency anemia secondary to inadequate dietary iron intake D50.8    Closed head injury S09.90XA    Scalp laceration S01. 01XA    Abnormal finding on imaging R93.89    Other irritable bowel syndrome K58.8    Frequent falls R29.6    Double vision H53.2    Muscle soreness M79.10    Peptic ulcer K27.9    Melena K92.1    PUD (peptic ulcer disease) K27.9    Absolute anemia D64.9    Acute blood loss anemia D62    Acute renal failure ([facility]) N17.9    Clostridium difficile infection A49.8    Acquired spondylolisthesis M43.10    Spinal stenosis of lumbar region with neurogenic claudication M48.062    Acute deep vein thrombosis (DVT) of proximal vein of left lower extremity ([facility]) I82.4Y2    Leg swelling M79.89    Back pain M54.9    Neurological disorder G98.8    Closed unstable burst fracture of fifth lumbar vertebra with routine healing S32.052D    Slow transit constipation K59.01    Major depressive disorder, recurrent, moderate ([facility]) F33.1    Acute deep vein thrombosis (DVT) of femoral vein of left lower extremity ([facility]) I82.412    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[facility]) M48.02, G99.2     DISPOSITION: discharged to [address] [place] [address]:    POSTERIOR C3-6 LAMINECTOMY, PARTIAL C7 LAMINECTOMY  FUSION [id number] [address] originally presented to the hospital on 5/21/2021  5:25 AM with cervical stenosis with mrelopathy. She was admitted and taken to the OR for neurosurgery for procedure listed above. Labs and imaging were followed daily.   At time of discharge, Stoney Cifuentes was tolerating a No diet orders on file, having bowel movements, ambulating on her own accord and had adequate analgesia on oral pain medications, and had no signs of symptoms of complications. She is medically stable to be discharged. PHYSICAL EXAMINATION        Discharge Vitals:  height is 5' 3\" (1.6 m) and weight is 170 lb (77.1 kg). Her oral temperature is 100 °F (37.8 °C). Her blood pressure is 161/55 (abnormal) and her pulse is 76. Her respiration is 18 and oxygen saturation is 96%. AOx3   Motor   L deltoid 5/5; R deltoid 5/5  L biceps 5/5; R biceps 5/5  L triceps 5/5; R triceps 5/5     L iliopsoas 5/5 , R iliopsoas 5/5  L quadriceps 5/5; R quadriceps 5/5  L Dorsiflexion 5/5; R dorsiflexion 5/5  L Plantarflexion 5/5; R plantarflexion 5/5  L EHL 5/5; R EHL 5/5     Sensation: intact   Incision: intact no drainage, erythema or edema noted        LABS     No results for input(s): WBC, HGB, HCT, PLT, NA, K, CL, CO2, BUN, CREATININE in the last 72 hours. DISCHARGE INSTRUCTIONS     Discharge Medications:        Medication List      START taking these medications    docusate sodium 100 MG capsule  Commonly known as: Colace  Take 1 capsule by mouth 2 times daily as needed for Constipation     oxyCODONE-acetaminophen 5-325 MG per tablet  Commonly known as: Percocet  Take 1-2 tablets by mouth every 6 hours as needed for Pain for up to 7 days.         CHANGE how you take these medications    citalopram 20 MG tablet  Commonly known as: CELEXA  Take 1 tablet by mouth daily  What changed: additional instructions     nitroGLYCERIN 0.4 MG SL tablet  Commonly known as: Nitrostat  Place 1 tablet under the tongue every 5 minutes as needed for Chest pain  What changed: additional instructions     SITagliptin 100 MG tablet  Commonly known as: Januvia  Take 0.5 tablets by mouth daily  What changed: additional instructions        CONTINUE taking these medications    amLODIPine 5 MG tablet  Commonly known as: Norvasc  Take 1 tablet by mouth daily     aspirin 81 MG chewable tablet     atorvastatin 80 MG tablet  Commonly known as: LIPITOR  Take 0.5 tablets by mouth nightly     BENADRYL ALLERGY PO     blood glucose test strips  Test 2 times a day & as needed for symptoms of irregular blood glucose.      Calcium 500/D 500-125 MG-UNIT Tabs  Generic drug: Calcium Carbonate-Vitamin D     carvedilol 12.5 MG tablet  Commonly known as: COREG  Take 1 tablet by mouth 2 times daily     cyanocobalamin 1000 MCG tablet  Commonly known as: [employer] VITAMIN B12  Take 1 tablet by mouth daily     fenofibrate micronized 134 MG capsule  Commonly known as: LOFIBRA  Take 1 capsule by mouth every morning (before breakfast)     ferrous sulfate 325 (65 Fe) MG tablet  Commonly known as: IRON 325  Take 1 tablet by mouth 2 times daily     furosemide 40 MG tablet  Commonly known as: LASIX  Take 1 tablet by mouth 2 times daily     Lancets Misc  1 each by Does not apply route daily     lisinopril 30 MG tablet  Commonly known as: PRINIVIL;ZESTRIL  Take 1 tablet by mouth daily     methocarbamol 750 MG tablet  Commonly known as: Robaxin-750  Take 3 times a day as need for spasm/pain     pantoprazole 40 MG tablet  Commonly known as: PROTONIX  Take 1 tablet by mouth 2 times daily (before meals)     VITAMIN D (ERGOCALCIFEROL) PO           Where to Get Your Medications      You can get these medications from any pharmacy    Bring a paper prescription for each of these medications  docusate sodium 100 MG capsule  oxyCODONE-acetaminophen 5-325 MG per tablet       Diet: No diet orders on file diet as tolerated  Activity: Provided in AVS  Wound Care: Daily and as needed  Follow-up:  in the [facility] as shown in AVS   Time Spent for discharge: 30 minutes    MAIK Akers NP  5/31/2021, 4:27 PM

## 2021-05-31 NOTE — PROGRESS NOTES
Discharged to [facility] per [facility]. Patient alert and oriented. All belongings sent with patient. Patient did call  that she was being discharged tonight. Report was called to [place]. All questions answered. Initial Anesthesia Post-op Note    Patient: Sunday FLORIDA Sarabia  Procedure(s) Performed: RIGHT TOTAL HIP DIRECT ANTERIOR ARTHROPLASTY  Anesthesia type: General    Vital Last Value   Temperature 36.8 °C (98.3 °F) (10/03/17 0625)   Pulse 63 (10/03/17 0625)   Respiratory Rate 16 (10/03/17 0625)   Non-Invasive   Blood Pressure 136/89 (10/03/17 0625)   Arterial  Blood Pressure     Pulse Oximetry 100 % (10/03/17 0625)     Last 24 I/O:   Intake/Output Summary (Last 24 hours) at 10/03/17 1007  Last data filed at 10/03/17 0954   Gross per 24 hour   Intake              800 ml   Output              300 ml   Net              500 ml       PATIENT LOCATION: PACU Phase 1  POST-OP VITAL SIGNS: stable  LEVEL OF CONSCIOUSNESS: awake, answers questions appropriately and participates in exam  RESPIRATORY STATUS: spontaneous ventilation, unassisted and room air  CARDIOVASCULAR: blood pressure returned to baseline and stable  HYDRATION: euvolemic    PAIN MANAGEMENT: well controlled  NAUSEA: None  AIRWAY PATENCY: patent  POST-OP ASSESSMENT: no complications, patient tolerated procedure well with no complications and no evidence of recall  COMPLICATIONS: none  Comments: Patient to PACU.  She is complaining of itchiness.  There does not appear to be a rash anywhere.  She will receive benadryl.  Report to RN.   HANDOFF:  Handoff to receiving nurse was performed and questions were answered

## 2021-06-21 DIAGNOSIS — M43.16 SPONDYLOLISTHESIS OF LUMBAR REGION: Primary | ICD-10-CM

## 2021-06-21 NOTE — TELEPHONE ENCOUNTER
Patient calling for refill of oxycodone.     Date of last fill: 6.12.2021  Surgery: 5.21.2021  Time elapsed since last surgery: 4 weeks  Date of next follow up appointment: 7.20.2021    Pt notified response time for refills is 24-48 hours

## 2021-06-22 RX ORDER — OXYCODONE HYDROCHLORIDE AND ACETAMINOPHEN 5; 325 MG/1; MG/1
1 TABLET ORAL EVERY 8 HOURS PRN
Qty: 21 TABLET | Refills: 0 | Status: SHIPPED | OUTPATIENT
Start: 2021-06-22 | End: 2021-06-30 | Stop reason: SDUPTHER

## 2021-06-24 ENCOUNTER — OFFICE VISIT (OUTPATIENT)
Dept: INTERNAL MEDICINE CLINIC | Age: 71
End: 2021-06-24
Payer: MEDICARE

## 2021-06-24 VITALS
HEIGHT: 63 IN | HEART RATE: 65 BPM | SYSTOLIC BLOOD PRESSURE: 130 MMHG | OXYGEN SATURATION: 96 % | DIASTOLIC BLOOD PRESSURE: 62 MMHG | TEMPERATURE: 96.6 F | BODY MASS INDEX: 30.72 KG/M2 | WEIGHT: 173.4 LBS

## 2021-06-24 DIAGNOSIS — M25.512 ACUTE PAIN OF BOTH SHOULDERS: ICD-10-CM

## 2021-06-24 DIAGNOSIS — M25.511 ACUTE PAIN OF BOTH SHOULDERS: ICD-10-CM

## 2021-06-24 DIAGNOSIS — M54.2 NECK PAIN: ICD-10-CM

## 2021-06-24 DIAGNOSIS — M48.02 STENOSIS OF CERVICAL SPINE WITH MYELOPATHY (HCC): ICD-10-CM

## 2021-06-24 DIAGNOSIS — I10 ESSENTIAL HYPERTENSION: ICD-10-CM

## 2021-06-24 DIAGNOSIS — G99.2 STENOSIS OF CERVICAL SPINE WITH MYELOPATHY (HCC): ICD-10-CM

## 2021-06-24 DIAGNOSIS — Z09 HOSPITAL DISCHARGE FOLLOW-UP: Primary | ICD-10-CM

## 2021-06-24 PROCEDURE — 99214 OFFICE O/P EST MOD 30 MIN: CPT | Performed by: NURSE PRACTITIONER

## 2021-06-24 PROCEDURE — 1111F DSCHRG MED/CURRENT MED MERGE: CPT | Performed by: NURSE PRACTITIONER

## 2021-06-24 SDOH — ECONOMIC STABILITY: FOOD INSECURITY: WITHIN THE PAST 12 MONTHS, THE FOOD YOU BOUGHT JUST DIDN'T LAST AND YOU DIDN'T HAVE MONEY TO GET MORE.: NEVER TRUE

## 2021-06-24 SDOH — ECONOMIC STABILITY: FOOD INSECURITY: WITHIN THE PAST 12 MONTHS, YOU WORRIED THAT YOUR FOOD WOULD RUN OUT BEFORE YOU GOT MONEY TO BUY MORE.: NEVER TRUE

## 2021-06-24 ASSESSMENT — SOCIAL DETERMINANTS OF HEALTH (SDOH): HOW HARD IS IT FOR YOU TO PAY FOR THE VERY BASICS LIKE FOOD, HOUSING, MEDICAL CARE, AND HEATING?: NOT HARD AT ALL

## 2021-06-24 NOTE — PROGRESS NOTES
Visit Information    Have you changed or started any medications since your last visit including any over-the-counter medicines, vitamins, or herbal medicines? no   Are you having any side effects from any of your medications? -  no  Have you stopped taking any of your medications? Is so, why? -  no    Have you seen any other physician or provider since your last visit? Yes - Records Requested  Have you had any other diagnostic tests since your last visit? Yes - Records Requested  Have you been seen in the emergency room and/or had an admission to a hospital since we last saw you? Yes - Records Requested  Have you had your routine dental cleaning in the past 6 months? yes -     Have you activated your CoSchedule account? If not, what are your barriers?  Yes     Patient Care Team:  Sudheer Bender MD as PCP - General (Internal Medicine)  Sudheer Bender MD as PCP - [facility]  Librado Yung MD as Consulting Physician (Gastroenterology)    Medical History Review  Past Medical, Family, and Social History reviewed and does contribute to the patient presenting condition    Health Maintenance   Topic Date Due    Diabetic foot exam  Never done    Diabetic retinal exam  Never done    DTaP/Tdap/Td vaccine (1 - Tdap) Never done    Shingles Vaccine (1 of 2) Never done    Flu vaccine (Season Ended) 09/01/2021    Annual Wellness Visit (AWV)  12/04/2021    A1C test (Diabetic or Prediabetic)  02/22/2022    Lipid screen  03/12/2022    Potassium monitoring  05/21/2022    Creatinine monitoring  05/21/2022    Breast cancer screen  05/06/2023    Colon cancer screen colonoscopy  10/07/2026    DEXA (modify frequency per FRAX score)  Completed    Pneumococcal 65+ years Vaccine  Completed    COVID-19 Vaccine  Completed    Hepatitis C screen  Completed    Hepatitis A vaccine  Aged Out    Hib vaccine  Aged Out    Meningococcal (ACWY) vaccine  Aged Out        Post-Discharge Transitional Care Management Services or Hospital Follow Up      Emre Escoto   YOB: 1950    Date of Office Visit:  6/24/2021  Date of Hospital Admission: 5/21/21  Date of Hospital Discharge: 5/30/21  Readmission Risk Score(high >=14%.  Medium >=10%):Readmission Risk Score: 15      Care management risk score Rising risk (score 2-5) and Complex Care (Scores >=6): 4     Non face to face  following discharge, date last encounter closed (first attempt may have been earlier): *No documented post hospital discharge outreach found in the last 14 days *No documented post hospital discharge outreach found in the last 14 days    Call initiated 2 business days of discharge: *No response recorded in the last 14 days     Patient Active Problem List   Diagnosis    Other specified disorders of rotator cuff syndrome of shoulder and allied disorders    Diverticulosis of large intestine    Intestinal or peritoneal adhesions with obstruction (postoperative) (postinfection) ([address]    Restless legs syndrome (RLS)    GERD (gastroesophageal reflux disease)    Essential hypertension    Mixed hyperlipidemia    Other abnormal glucose    Atherosclerosis    Allergic rhinitis    Vitamin D deficiency    Depression    Degenerative joint disease (DJD) of hip    Peripheral edema    Injury of foot, left    Facial droop    CVA (cerebral vascular accident) ([address]    Bradycardia    Ataxia    Aphasia    TIA (transient ischemic attack)    Need for prophylactic vaccination and inoculation against cholera alone    Osteopenia    Lumbago    Meralgia paresthetica of right side    Lumbar degenerative disc disease    Spondylosis of lumbar region without myelopathy or radiculopathy    Blood poisoning    Cellulitis of left lower extremity    Encounter for medication monitoring    Deep vein thrombosis (DVT) of right lower extremity ([address]    Chronic deep vein thrombosis (DVT) of proximal vein of both lower extremities (HCC)    Chronic diastolic heart failure every 8 hours as needed for Pain for up to 7 days. 21 tablet 0    docusate sodium (COLACE) 100 MG capsule Take 1 capsule by mouth 2 times daily as needed for Constipation 60 capsule 1    pantoprazole (PROTONIX) 40 MG tablet Take 1 tablet by mouth 2 times daily (before meals) 180 tablet 3    aspirin 81 MG chewable tablet Take 81 mg by mouth daily Indications: pt reports intstructed to hold x 10 days prior to surgery      citalopram (CELEXA) 20 MG tablet Take 1 tablet by mouth daily (Patient taking differently: Take 20 mg by mouth daily Per pcp) 90 tablet 0    carvedilol (COREG) 12.5 MG tablet Take 1 tablet by mouth 2 times daily 180 tablet 3    SITagliptin (JANUVIA) 100 MG tablet Take 0.5 tablets by mouth daily (Patient taking differently: Take 50 mg by mouth daily Per pcp) 90 tablet 3    amLODIPine (NORVASC) 5 MG tablet Take 1 tablet by mouth daily 90 tablet 3    diphenhydrAMINE HCl (BENADRYL ALLERGY PO) Take 1 capsule by mouth daily Takes q HS      methocarbamol (ROBAXIN-750) 750 MG tablet Take 3 times a day as need for spasm/pain 60 tablet 1    lisinopril (PRINIVIL;ZESTRIL) 30 MG tablet Take 1 tablet by mouth daily 90 tablet 0    atorvastatin (LIPITOR) 80 MG tablet Take 0.5 tablets by mouth nightly 90 tablet 3    fenofibrate micronized (LOFIBRA) 134 MG capsule Take 1 capsule by mouth every morning (before breakfast) 90 capsule 3    cyanocobalamin ([employer] VITAMIN B12) 1000 MCG tablet Take 1 tablet by mouth daily 30 tablet 3    furosemide (LASIX) 40 MG tablet Take 1 tablet by mouth 2 times daily 180 tablet 3    ferrous sulfate (IRON 325) 325 (65 Fe) MG tablet Take 1 tablet by mouth 2 times daily 90 tablet 2    VITAMIN D, ERGOCALCIFEROL, PO Take by mouth      Lancets MISC 1 each by Does not apply route daily 100 each 3    blood glucose monitor strips Test 2 times a day & as needed for symptoms of irregular blood glucose.  100 strip 5    nitroGLYCERIN (NITROSTAT) 0.4 MG SL tablet Place 1 tablet under the tongue every 5 minutes as needed for Chest pain (Patient taking differently: Place 0.4 mg under the tongue every 5 minutes as needed for Chest pain Hasn't taken in about a year) 75 tablet 3    Calcium Carbonate-Vitamin D (CALCIUM 500/D) 500-125 MG-UNIT TABS Take 1 tablet by mouth 2 times daily           Medications patient taking as of now reconciled against medications ordered at time of hospital discharge: Yes    Chief Complaint   Patient presents with    [facility] 5[facility]21-5/30 for stenosis of cervical spine, Pt states feeling okay but has some pain in the shoulders       HPI  Patient is seen today for hospital follow-up. She is s/p laminectomy for cervical spinal stenosis with myelopathy. She is following with neurosurgery. She is wearing cervical support collar, is using Percocet as needed for pain, reports that neck pain is improving, she is having some upper back, shoulder pain. Hospital progress notes, imaging reviewed. She has lengthy medical history including history of MI, CHF, TIA, hypertension, DVT, diabetes. Inpatient course: Discharge summary reviewed- see chart. Interval history/Current status: stable    Review of Systems   Constitutional: Positive for fatigue. Negative for chills and fever. HENT: Negative for congestion, sore throat and trouble swallowing. Eyes: Negative for discharge and visual disturbance. Respiratory: Negative for cough, shortness of breath and wheezing. Cardiovascular: Negative for chest pain, palpitations and leg swelling. Gastrointestinal: Negative for abdominal pain, constipation, diarrhea and nausea. Genitourinary: Negative for difficulty urinating. Musculoskeletal: Positive for arthralgias, gait problem (uses walker) and neck pain.         Lam shoulders, started 1 week after surgery, 5/10 at worst, aching, better with ice, better with percocet, tylenol  No trauma that she knows of although she did fall in the hospital Neurological: Positive for weakness (Generalized). Negative for dizziness and headaches. Psychiatric/Behavioral: Negative for sleep disturbance. The patient is not nervous/anxious. Vitals:    06/24/21 1422   BP: 130/62   Pulse: 65   Temp: 96.6 °F (35.9 °C)   SpO2: 96%   Weight: 173 lb 6.4 oz (78.7 kg)   Height: 5' 3\" (1.6 m)     Body mass index is 30.72 kg/m². Wt Readings from Last 3 Encounters:   06/24/21 173 lb 6.4 oz (78.7 kg)   05/21/21 170 lb (77.1 kg)   05/07/21 178 lb 1.9 oz (80.8 kg)     BP Readings from Last 3 Encounters:   06/24/21 130/62   05/30/21 (!) 161/55   05/21/21 (!) 61/44       Physical Exam  Constitutional:       General: She is not in acute distress. Appearance: Normal appearance. She is well-developed. HENT:      Head: Normocephalic and atraumatic. Right Ear: External ear normal.      Left Ear: External ear normal.      Nose: Nose normal.   Eyes:      General:         Right eye: No discharge. Left eye: No discharge. Conjunctiva/sclera: Conjunctivae normal.      Pupils: Pupils are equal, round, and reactive to light. Neck:      Thyroid: No thyromegaly. Comments: Cervical brace on  Cardiovascular:      Rate and Rhythm: Normal rate and regular rhythm. Pulses: Normal pulses. Heart sounds: Normal heart sounds. No murmur heard. Pulmonary:      Effort: Pulmonary effort is normal.      Breath sounds: Normal breath sounds. No wheezing. Abdominal:      General: Bowel sounds are normal. There is no distension. Palpations: Abdomen is soft. Tenderness: There is no abdominal tenderness. Musculoskeletal:      Cervical back: Decreased range of motion. Thoracic back: No deformity or tenderness. Lumbar back: No deformity or tenderness. Normal range of motion. Right lower leg: No edema. Left lower leg: No edema. Skin:     General: Skin is warm and dry.    Neurological:      Mental Status: She is alert and oriented to person, place, and time. Gait: Gait abnormal (using walker).    Psychiatric:         Mood and Affect: Mood normal.         Behavior: Behavior normal.             Assessment/Plan:  Visit Diagnoses and [address] discharge follow-up    -  Primary    LA DISCHARGE MEDS RECONCILED W/ CURRENT OUTPATIENT MED LIST [8203L CPT(R)]           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)        S/P laminectomy         Neck pain        Slowly improving, plan to follow-up with neurosurgeon, using Percocet as needed         Acute pain of both shoulders        Mild, continue with PT, ice, Tylenol as needed         Essential hypertension        Controlled, continue current treatment plan                 Medical Decision Making: straightforward

## 2021-06-29 ENCOUNTER — TELEPHONE (OUTPATIENT)
Dept: NEUROSURGERY | Age: 71
End: 2021-06-29

## 2021-06-29 DIAGNOSIS — M43.16 SPONDYLOLISTHESIS OF LUMBAR REGION: ICD-10-CM

## 2021-06-29 RX ORDER — OXYCODONE HYDROCHLORIDE AND ACETAMINOPHEN 5; 325 MG/1; MG/1
1 TABLET ORAL EVERY 8 HOURS PRN
Qty: 21 TABLET | Refills: 0 | Status: CANCELLED | OUTPATIENT
Start: 2021-06-29 | End: 2021-07-06

## 2021-06-29 NOTE — TELEPHONE ENCOUNTER
Patient would like to know if she can take off her collar?        Patient calling for refill of oxycodone.     Date of last fill: 6.22.2021  Surgery: 5.21.2021  Time elapsed since last surgery: 5 weeks  Date of next follow up appointment: 7.20.2021     Pt notified response time for refills is 24-48 hours

## 2021-06-30 ENCOUNTER — OFFICE VISIT (OUTPATIENT)
Dept: NEUROSURGERY | Age: 71
End: 2021-06-30

## 2021-06-30 VITALS
SYSTOLIC BLOOD PRESSURE: 160 MMHG | WEIGHT: 173 LBS | HEIGHT: 63 IN | HEART RATE: 77 BPM | DIASTOLIC BLOOD PRESSURE: 78 MMHG | BODY MASS INDEX: 30.65 KG/M2 | OXYGEN SATURATION: 96 %

## 2021-06-30 DIAGNOSIS — G99.2 STENOSIS OF CERVICAL SPINE WITH MYELOPATHY (HCC): Primary | ICD-10-CM

## 2021-06-30 DIAGNOSIS — Z98.1 S/P CERVICAL SPINAL FUSION: ICD-10-CM

## 2021-06-30 DIAGNOSIS — M48.02 STENOSIS OF CERVICAL SPINE WITH MYELOPATHY (HCC): Primary | ICD-10-CM

## 2021-06-30 PROCEDURE — 99024 POSTOP FOLLOW-UP VISIT: CPT | Performed by: NURSE PRACTITIONER

## 2021-06-30 RX ORDER — OXYCODONE HYDROCHLORIDE AND ACETAMINOPHEN 5; 325 MG/1; MG/1
1 TABLET ORAL EVERY 8 HOURS PRN
Qty: 18 TABLET | Refills: 0 | Status: SHIPPED | OUTPATIENT
Start: 2021-06-30 | End: 2021-07-07 | Stop reason: SDUPTHER

## 2021-06-30 ASSESSMENT — ENCOUNTER SYMPTOMS
TROUBLE SWALLOWING: 0
DIARRHEA: 0
EYE DISCHARGE: 0
SHORTNESS OF BREATH: 0
ABDOMINAL PAIN: 0
NAUSEA: 0
COUGH: 0
CONSTIPATION: 0
SORE THROAT: 0
WHEEZING: 0

## 2021-06-30 NOTE — PATIENT INSTRUCTIONS
Schedule a Vaccine  When you qualify to receive the vaccine, call the [facility]) COVID-19 Vaccination Hotline to schedule your appointment or to get additional information about the [facility]) locations which are offering the COVID-19 vaccine. To be 94% effective, it's important that you receive two doses of one of the COVID-19 vaccines. -If you are receiving the Gonzalez Peter vaccine, your second shot will be scheduled as close to 21 days after the first shot as possible. -If you are receiving the Moderna vaccine, your second shot will be scheduled as close to 28 days after the first shot as possible. [facility]) COVID-19 Vaccination Hotline: [phone]    Links to [facility]) website and [facility] website:    [facility]-[email or web address]-[email or web address]-vaccine/[email or web address]    [email or web address]

## 2021-06-30 NOTE — PROGRESS NOTES
Dirk Cortés  [facility] [address]  [address]  Dept: [phone]    Patient:  Paola Horne  YOB: 1950  Date: 6/30/21    The patient is a 80 y.o. female who presents today for consult of the following problems:     Chief Complaint   Patient presents with    Post-Op Check     2 weeks         HPI:     Paola Horne is a 80 y.o. female who presents to the office for post-op evaluation s/p posterior C3-6 laminectomy fusion. Patient was initially discharged to extended care facility. Was undergoing PT and OT. Was discharged home on June 12. Has home physical and occupational therapy coming, [address] [place] [address] Dr living. 2-3 times weekly physical therapy, 2 times weekly occupational therapy, as well as nursing. Incision has healed well. Patient has been compliant with cervical collar. Does have some expected axial discomfort, does feel some improvement to gait stability as well as paresthesias to extremities. Walking utilizing a walker presently. Incision well-healed  Cervical collar remains in place  Strength 5/5  Sensation intact    Date of surgery: 5/21//2021    Assessment and Plan:      1. 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[facility] [address]    2. S/P cervical spinal fusion          Plan: Continue home PT/OT, no cervical work at this time, cervical collar to remain in place. Patient to return in July for postop with Dr. Shamir Wilcox, upright cervical flexion-extension x-rays prior. Followup: Return in about 3 weeks (around 7/21/2021), or if symptoms worsen or fail to improve. Prescriptions Ordered:  Orders Placed This Encounter   Medications    oxyCODONE-acetaminophen (PERCOCET) 5-325 MG per tablet     Sig: Take 1 tablet by mouth every 8 hours as needed for Pain for up to 7 days.      Dispense:  18 tablet     Refill:  0     Reduce doses taken as pain becomes manageable        Orders Placed:  Orders Placed This Encounter   Procedures    XR CERVICAL SPINE FLEXION AND EXTENSION     Standing Status:   Future     Standing Expiration Date:   9/28/2021     Order Specific Question:   Reason for exam:     Answer:   evaluate posterior fusion        Electronically signed by MAIK Curiel CNP on 6/30/2021 at 2:49 PM    Please note that this chart was generated using voice recognition Dragon dictation software. Although every effort was made to ensure the accuracy of this automated transcription, some errors in transcription may have occurred.

## 2021-06-30 NOTE — TELEPHONE ENCOUNTER
Cervical collar needs to stay on, it also appears that the patient canceled her 2-week postop appointment, this likely needs to be rescheduled.

## 2021-07-01 DIAGNOSIS — E78.5 HYPERLIPIDEMIA, UNSPECIFIED HYPERLIPIDEMIA TYPE: ICD-10-CM

## 2021-07-01 RX ORDER — FENOFIBRATE 134 MG/1
134 CAPSULE ORAL
Qty: 90 CAPSULE | Refills: 3 | Status: SHIPPED | OUTPATIENT
Start: 2021-07-01 | End: 2021-10-12 | Stop reason: SDUPTHER

## 2021-07-06 ENCOUNTER — TELEPHONE (OUTPATIENT)
Dept: INTERNAL MEDICINE CLINIC | Age: 71
End: 2021-07-06

## 2021-07-06 NOTE — TELEPHONE ENCOUNTER
----- Message from Jason Alvarez sent at 7/6/2021  3:54 PM EDT -----  Subject: Refill Request    QUESTIONS  Name of Medication? oxyCODONE-acetaminophen (PERCOCET) 5-325 MG per tablet  Patient-reported dosage and instructions? 5-325 MG, One every 8 hours   How many days do you have left? 0  Preferred Pharmacy? [address] phone number (if available)? [phone]  ---------------------------------------------------------------------------  --------------  CALL BACK INFO  What is the best way for the office to contact you? OK to leave message on   voicemail  Preferred Call Back Phone Number?  [phone]

## 2021-07-07 ENCOUNTER — TELEPHONE (OUTPATIENT)
Dept: NEUROSURGERY | Age: 71
End: 2021-07-07

## 2021-07-07 DIAGNOSIS — G99.2 STENOSIS OF CERVICAL SPINE WITH MYELOPATHY (HCC): ICD-10-CM

## 2021-07-07 DIAGNOSIS — Z98.1 S/P CERVICAL SPINAL FUSION: ICD-10-CM

## 2021-07-07 DIAGNOSIS — M48.02 STENOSIS OF CERVICAL SPINE WITH MYELOPATHY (HCC): ICD-10-CM

## 2021-07-07 RX ORDER — OXYCODONE HYDROCHLORIDE AND ACETAMINOPHEN 5; 325 MG/1; MG/1
1 TABLET ORAL EVERY 12 HOURS PRN
Qty: 14 TABLET | Refills: 0 | Status: SHIPPED | OUTPATIENT
Start: 2021-07-07 | End: 2021-07-14

## 2021-07-19 ENCOUNTER — HOSPITAL ENCOUNTER (OUTPATIENT)
Age: 71
Discharge: HOME | End: 2021-07-21
Payer: MEDICARE

## 2021-07-19 ENCOUNTER — HOSPITAL ENCOUNTER (OUTPATIENT)
Dept: GENERAL RADIOLOGY | Age: 71
Discharge: HOME | End: 2021-07-21
Payer: MEDICARE

## 2021-07-19 DIAGNOSIS — G99.2 STENOSIS OF CERVICAL SPINE WITH MYELOPATHY (HCC): ICD-10-CM

## 2021-07-19 DIAGNOSIS — M48.02 STENOSIS OF CERVICAL SPINE WITH MYELOPATHY (HCC): ICD-10-CM

## 2021-07-19 DIAGNOSIS — Z98.1 S/P CERVICAL SPINAL FUSION: ICD-10-CM

## 2021-07-19 PROCEDURE — 72040 X-RAY EXAM NECK SPINE 2-3 VW: CPT

## 2021-07-20 ENCOUNTER — OFFICE VISIT (OUTPATIENT)
Dept: NEUROSURGERY | Age: 71
End: 2021-07-20

## 2021-07-20 VITALS
WEIGHT: 173 LBS | RESPIRATION RATE: 14 BRPM | DIASTOLIC BLOOD PRESSURE: 69 MMHG | BODY MASS INDEX: 30.65 KG/M2 | HEIGHT: 63 IN | OXYGEN SATURATION: 99 % | SYSTOLIC BLOOD PRESSURE: 152 MMHG | HEART RATE: 68 BPM

## 2021-07-20 DIAGNOSIS — Z98.1 S/P CERVICAL SPINAL FUSION: Primary | ICD-10-CM

## 2021-07-20 PROCEDURE — 99024 POSTOP FOLLOW-UP VISIT: CPT | Performed by: NEUROLOGICAL SURGERY

## 2021-07-20 RX ORDER — OXYCODONE HYDROCHLORIDE AND ACETAMINOPHEN 5; 325 MG/1; MG/1
1 TABLET ORAL EVERY 8 HOURS PRN
Qty: 21 TABLET | Refills: 0 | Status: SHIPPED | OUTPATIENT
Start: 2021-07-20 | End: 2021-08-02 | Stop reason: SDUPTHER

## 2021-07-20 RX ORDER — METHOCARBAMOL 750 MG/1
750 TABLET, FILM COATED ORAL 3 TIMES DAILY PRN
Qty: 90 TABLET | Refills: 2 | Status: SHIPPED | OUTPATIENT
Start: 2021-07-20 | End: 2021-07-30

## 2021-07-20 NOTE — PROGRESS NOTES
(multiple drug resistant organisms) resistance     c diff    Muscle strain     right posterior shoulder    Other abnormal glucose     PONV (postoperative nausea and vomiting)     dry heaves    Pre-diabetes     Restless legs syndrome (RLS)     Snores     no cpap    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)     TIA (transient ischemic attack) 2014    Uses walker     Vitamin D deficiency     Wears glasses     Wellness examination     last seen 2 weeks ago       Past Surgical History:        Procedure Laterality Date    ABDOMEN SURGERY  1976    benign tumor removed near remaining ovary, 1.5 pounds    APPENDECTOMY  1968    appendix ruptured, developed peritonitis    BACK SURGERY      BUNIONECTOMY Left     along with calcium deposits removed   [address]  2005    negative    CERVICAL FUSION  05/21/2021    POSTERIOR C3-6 LAMINECTOMY, PARTIAL C7 LAMINECTOMY, FUSION C3-C6, SILVERCORD    CERVICAL FUSION N/A 5/21/2021    POSTERIOR C3-6 LAMINECTOMY, PARTIAL C7 LAMINECTOMY, FUSION C3-C6, SILVERCORD performed by Kalyn Ramirez DO at [address] [place] [address]    12 INCHES REMOVED D/T OBSTRUCTION    COLONOSCOPY      CYST REMOVAL Right     right facial    HYSTERECTOMY  1973    taken as a result of recurring cysts    LUMBAR FUSION N/A 02/10/2020    LUMBAR L4-5 POSTERIOR  DECOMPRESSION INSTRUMENTATION FUSION WCEMENT AUGMENTATION/ performed by Karly Vences MD at [place] [address] N/A 06/17/2020    L5-S1 PLIF L4-L5 REVISION performed by Karly Vences MD at [facility]  08/14/2014    [address]    UNILATERAL due to cyst    OVARY REMOVAL  1971    partial, due to cyst   Leela Her SINUS SURGERY  2004    UPPER GASTROINTESTINAL ENDOSCOPY N/A 05/31/2019    EGD ESOPHAGOGASTRODUODENOSCOPY performed by Shann Spurling, MD at [facility] 08/05/2019    EGD BIOPSY performed by Consuelo Rivas Adrián Vee MD at [facility] N/A 2019    EGD BIOPSY performed by Jessica Vilchis MD at [address] 2019    WRIST OPEN REDUCTION INTERNAL FIXATION performed by Christo Vital MD at [facility] History:   Social History     Socioeconomic History    Marital status:      Spouse name: Not on file    Number of children: Not on file    Years of education: Not on file    Highest education level: Not on file   Occupational History    Occupation: retired   Tobacco Use    Smoking status: Former Smoker     Packs/day: 0.50     Years: 20.00     Pack years: 10.00     Types: Cigarettes     Start date: 1995     Quit date: 2017     Years since quittin.0    Smokeless tobacco: Never Used   Vaping Use    Vaping Use: Never used   Substance and Sexual Activity    Alcohol use: No     Alcohol/week: 0.0 standard drinks    Drug use: No    Sexual activity: Not on file   Other Topics Concern    Not on file   Social History Narrative    Not on file     Social Determinants of Health     Financial Resource Strain: Low Risk     Difficulty of Paying Living Expenses: Not hard at all   Food Insecurity: No Food Insecurity    Worried About [facility] in the Last Year: Never true    [facility] in the Last Year: Never true   Transportation Needs:     Lack of Transportation (Medical):      Lack of Transportation (Non-Medical):    Physical Activity:     Days of Exercise per Week:     Minutes of Exercise per Session:    Stress:     Feeling of Stress :    Social Connections:     Frequency of Communication with Friends and Family:     Frequency of Social Gatherings with Friends and Family:     Attends [employer] Services:     Active Member of Clubs or Organizations:     Attends Club or Organization Meetings:     Marital Status:    Intimate Partner Violence:     Fear of Current or Ex-Partner:     Emotionally Abused:     Physically Abused:     Sexually Abused:        Family History:       Problem Relation Age of Onset    Stroke Mother     Diabetes Mother     Heart Disease Mother     High Blood Pressure Mother     Heart Disease Father     Heart Disease Brother     High Blood Pressure Brother     Heart Disease Maternal Grandmother     High Blood Pressure Sister        Allergies:  Mobic [meloxicam], Bactrim [sulfamethoxazole-trimethoprim], Codeine, and Seasonal    Home Medications:  Prior to Admission medications    Medication Sig Start Date End Date Taking?  Authorizing Provider   fenofibrate micronized (LOFIBRA) 134 MG capsule Take 1 capsule by mouth every morning (before breakfast) 7/1/21  Yes Jose Raul Parr MD   pantoprazole (PROTONIX) 40 MG tablet Take 1 tablet by mouth 2 times daily (before meals) 5/17/21  Yes Trish Lucio MD   aspirin 81 MG chewable tablet Take 81 mg by mouth daily Indications: pt reports intstructed to hold x 10 days prior to surgery   Yes Historical Provider, MD   citalopram (CELEXA) 20 MG tablet Take 1 tablet by mouth daily  Patient taking differently: Take 20 mg by mouth daily Per pcp 4/22/21  Yes Trish Lucio MD   carvedilol (COREG) 12.5 MG tablet Take 1 tablet by mouth 2 times daily 4/15/21  Yes Trish Lucio MD   SITagliptin (JANUVIA) 100 MG tablet Take 0.5 tablets by mouth daily  Patient taking differently: Take 50 mg by mouth daily Per pcp 4/15/21  Yes Trish Lucio MD   amLODIPine (NORVASC) 5 MG tablet Take 1 tablet by mouth daily 4/15/21  Yes Trish Lucio MD   diphenhydrAMINE HCl (BENADRYL ALLERGY PO) Take 1 capsule by mouth daily Takes q HS   Yes Historical Provider, MD   lisinopril (PRINIVIL;ZESTRIL) 30 MG tablet Take 1 tablet by mouth daily 4/8/21  Yes Nela Burris, APRN - CNP   atorvastatin (LIPITOR) 80 MG tablet Take 0.5 tablets by mouth nightly 4/1/21  Yes Trish Lucio MD   cyanocobalamin ([employer] VITAMIN B12) 1000 MCG tablet Take 1 tablet by mouth daily 12/3/20  Yes Giovanny Asif MD   furosemide (LASIX) 40 MG tablet Take 1 tablet by mouth 2 times daily 9/1/20  Yes Giovanny Asif MD   ferrous sulfate (IRON 325) 325 (65 Fe) MG tablet Take 1 tablet by mouth 2 times daily 7/2/20  Yes Leonid Mejia MD   VITAMIN D, ERGOCALCIFEROL, PO Take by mouth   Yes Historical Provider, MD   Lancets MISC 1 each by Does not apply route daily 10/10/19  Yes James Meraz MD   blood glucose monitor strips Test 2 times a day & as needed for symptoms of irregular blood glucose. 10/10/19  Yes James Meraz MD   nitroGLYCERIN (NITROSTAT) 0.4 MG SL tablet Place 1 tablet under the tongue every 5 minutes as needed for Chest pain  Patient taking differently: Place 0.4 mg under the tongue every 5 minutes as needed for Chest pain Hasn't taken in about a year 1/8/18  Yes Mau Schwartz MD   Calcium Carbonate-Vitamin D (CALCIUM 500/D) 500-125 MG-UNIT TABS Take 1 tablet by mouth 2 times daily    Yes Historical Provider, MD   docusate sodium (COLACE) 100 MG capsule Take 1 capsule by mouth 2 times daily as needed for Constipation  Patient not taking: Reported on 7/20/2021 5/30/21   Karolina Marley DO   methocarbamol (ROBAXIN-750) 750 MG tablet Take 3 times a day as need for spasm/pain  Patient not taking: Reported on 7/20/2021 4/8/21   Kenn Mayfield DO       Current Medications:   No current facility-administered medications for this visit.       PHYSICAL EXAM:       BP (!) 152/69   Pulse 68   Resp 14   Ht 5' 3\" (1.6 m)   Wt 173 lb (78.5 kg)   SpO2 99%   BMI 30.65 kg/m²   Physical Exam     Gen: NAD  HEENT: moist mucus membranes  Cardio: RRR  Pulm: chest rise symmetrically  GI: abd soft  Ext: no edema  Skin: warm    Neuro:    AOX3  CN 2-12 grossly intact  Speech articulate  Motor 5/5  No pronator drift  Sensation symmetrical   Negative wright sign  Abnormal gait, left knee hyperextension          Radiology Review:  c-spine xray 07/21: stable C3-6 hardward    ASSESSMENT AND PLAN:       Patient Active Problem List   Diagnosis    Other specified disorders of rotator cuff syndrome of shoulder and allied disorders    Diverticulosis of large intestine    Intestinal or peritoneal adhesions with obstruction (postoperative) (postinfection) (HCC)    Restless legs syndrome (RLS)    GERD (gastroesophageal reflux disease)    Essential hypertension    Mixed hyperlipidemia    Other abnormal glucose    Atherosclerosis    Allergic rhinitis    Vitamin D deficiency    Depression    Degenerative joint disease (DJD) of hip    Peripheral edema    Injury of foot, left    Facial droop    CVA (cerebral vascular accident) ([address]    Bradycardia    Ataxia    Aphasia    TIA (transient ischemic attack)    Need for prophylactic vaccination and inoculation against cholera alone    Osteopenia    Lumbago    Meralgia paresthetica of right side    Lumbar degenerative disc disease    Spondylosis of lumbar region without myelopathy or radiculopathy    Blood poisoning    Cellulitis of left lower extremity    Encounter for medication monitoring    Deep vein thrombosis (DVT) of right lower extremity ([address]    Chronic deep vein thrombosis (DVT) of proximal vein of both lower extremities ([facility])    Chronic diastolic heart failure ([facility])    Neurogenic claudication due to lumbar spinal stenosis    Sacroiliitis ([address]    Stage 3 chronic kidney disease ([address]    Type 2 diabetes mellitus with circulatory disorder, without long-term current use of insulin (HCC)    Chronic deep vein thrombosis (DVT) of popliteal vein of right lower extremity (HCC)    Closed fracture of left wrist    B12 deficiency    Iron deficiency anemia secondary to inadequate dietary iron intake    Closed head injury    Scalp laceration    Abnormal finding on imaging    Other irritable bowel syndrome    Frequent falls    Double vision    Muscle soreness    Peptic ulcer    Melena    PUD (peptic ulcer disease)    Absolute anemia    Acute blood loss anemia    Acute renal failure (HCC)    Clostridium difficile infection    Acquired spondylolisthesis    Spinal stenosis of lumbar region with neurogenic claudication    Acute deep vein thrombosis (DVT) of proximal vein of left lower extremity (HCC)    Leg swelling    Back pain    Neurological disorder    Closed unstable burst fracture of fifth lumbar vertebra with routine healing    Slow transit constipation    Major depressive disorder, recurrent, moderate (HCC)    Acute deep vein thrombosis (DVT) of femoral vein of left lower extremity ([address]    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)         A/P:  This is a 80 y.o. female 8 weeks post-op s/p C3-6 laminectomy and fusion for myelopathy  She is doing well and recovering as expected   She can still using collar  Recommend physical therapy and massage therapy  Follow up in 6 weeks          Patient and/or family was counseled on the diagnosis and treatment plan    Nick Hollins DO     Board Certified Neurosurgeon  7/20/2021  1:14 PM      This note was created using voice recognition software. There may be inaccuracies of transcription  that are inadvertently overlooked prior to the signature. There is any questions about the transcription please contact me.

## 2021-07-22 ENCOUNTER — OFFICE VISIT (OUTPATIENT)
Dept: INTERNAL MEDICINE CLINIC | Age: 71
End: 2021-07-22
Payer: MEDICARE

## 2021-07-22 ENCOUNTER — HOSPITAL ENCOUNTER (OUTPATIENT)
Age: 71
Setting detail: SPECIMEN
Discharge: HOME | End: 2021-07-22
Payer: MEDICARE

## 2021-07-22 VITALS
SYSTOLIC BLOOD PRESSURE: 128 MMHG | DIASTOLIC BLOOD PRESSURE: 72 MMHG | HEIGHT: 63 IN | BODY MASS INDEX: 30.12 KG/M2 | WEIGHT: 170 LBS

## 2021-07-22 DIAGNOSIS — I10 HYPERTENSION, UNSPECIFIED TYPE: ICD-10-CM

## 2021-07-22 DIAGNOSIS — N17.9 ACUTE RENAL FAILURE, UNSPECIFIED ACUTE RENAL FAILURE TYPE (HCC): ICD-10-CM

## 2021-07-22 DIAGNOSIS — I63.9 CEREBROVASCULAR ACCIDENT (CVA), UNSPECIFIED MECHANISM (HCC): Primary | ICD-10-CM

## 2021-07-22 DIAGNOSIS — M54.2 NECK PAIN: ICD-10-CM

## 2021-07-22 DIAGNOSIS — F33.1 MAJOR DEPRESSIVE DISORDER, RECURRENT, MODERATE (HCC): ICD-10-CM

## 2021-07-22 DIAGNOSIS — G25.81 RLS (RESTLESS LEGS SYNDROME): ICD-10-CM

## 2021-07-22 LAB
ANION GAP SERPL CALCULATED.3IONS-SCNC: 13 MMOL/L (ref 9–17)
BUN BLDV-MCNC: 24 MG/DL (ref 8–23)
BUN/CREAT BLD: ABNORMAL (ref 9–20)
CALCIUM SERPL-MCNC: 9.1 MG/DL (ref 8.6–10.4)
CHLORIDE BLD-SCNC: 107 MMOL/L (ref 98–107)
CO2: 21 MMOL/L (ref 20–31)
CREAT SERPL-MCNC: 0.8 MG/DL (ref 0.5–0.9)
GFR AFRICAN AMERICAN: >60 ML/MIN
GFR NON-AFRICAN AMERICAN: >60 ML/MIN
GFR SERPL CREATININE-BSD FRML MDRD: ABNORMAL ML/MIN/{1.73_M2}
GFR SERPL CREATININE-BSD FRML MDRD: ABNORMAL ML/MIN/{1.73_M2}
GLUCOSE BLD-MCNC: 90 MG/DL (ref 70–99)
POTASSIUM SERPL-SCNC: 4.2 MMOL/L (ref 3.7–5.3)
SODIUM BLD-SCNC: 141 MMOL/L (ref 135–144)

## 2021-07-22 PROCEDURE — 99214 OFFICE O/P EST MOD 30 MIN: CPT | Performed by: INTERNAL MEDICINE

## 2021-07-22 RX ORDER — LISINOPRIL 30 MG/1
30 TABLET ORAL DAILY
Qty: 90 TABLET | Refills: 0 | Status: SHIPPED | OUTPATIENT
Start: 2021-07-22 | End: 2021-10-25 | Stop reason: SDUPTHER

## 2021-07-22 RX ORDER — PRAMIPEXOLE DIHYDROCHLORIDE 0.5 MG/1
0.5 TABLET ORAL NIGHTLY
Qty: 90 TABLET | Refills: 3 | Status: SHIPPED | OUTPATIENT
Start: 2021-07-22 | End: 2021-09-14 | Stop reason: SDUPTHER

## 2021-07-22 RX ORDER — ATORVASTATIN CALCIUM 80 MG/1
40 TABLET, FILM COATED ORAL NIGHTLY
Qty: 90 TABLET | Refills: 3 | Status: SHIPPED | OUTPATIENT
Start: 2021-07-22 | End: 2022-02-01 | Stop reason: SDUPTHER

## 2021-07-28 ASSESSMENT — ENCOUNTER SYMPTOMS
SHORTNESS OF BREATH: 0
DIARRHEA: 0
ABDOMINAL DISTENTION: 0
COLOR CHANGE: 0
CHOKING: 0
APNEA: 0
COUGH: 0
EYE DISCHARGE: 0
CONSTIPATION: 0
BLOOD IN STOOL: 0
CHEST TIGHTNESS: 0
ABDOMINAL PAIN: 0
EYE REDNESS: 0
EYE ITCHING: 0
BACK PAIN: 1
EYE PAIN: 0

## 2021-08-02 DIAGNOSIS — Z98.1 S/P CERVICAL SPINAL FUSION: ICD-10-CM

## 2021-08-02 RX ORDER — OXYCODONE HYDROCHLORIDE AND ACETAMINOPHEN 5; 325 MG/1; MG/1
1 TABLET ORAL EVERY 12 HOURS PRN
Qty: 14 TABLET | Refills: 0 | Status: SHIPPED | OUTPATIENT
Start: 2021-08-02 | End: 2021-08-10 | Stop reason: SDUPTHER

## 2021-08-10 DIAGNOSIS — Z98.1 S/P CERVICAL SPINAL FUSION: ICD-10-CM

## 2021-08-10 RX ORDER — OXYCODONE HYDROCHLORIDE AND ACETAMINOPHEN 5; 325 MG/1; MG/1
1 TABLET ORAL EVERY 12 HOURS PRN
Qty: 10 TABLET | Refills: 0 | Status: SHIPPED | OUTPATIENT
Start: 2021-08-10 | End: 2021-08-16 | Stop reason: SDUPTHER

## 2021-08-16 ENCOUNTER — HOSPITAL ENCOUNTER (OUTPATIENT)
Dept: PHYSICAL THERAPY | Age: 71
Setting detail: THERAPIES SERIES
Discharge: HOME | End: 2021-08-16
Payer: MEDICARE

## 2021-08-16 DIAGNOSIS — I10 ESSENTIAL HYPERTENSION: ICD-10-CM

## 2021-08-16 DIAGNOSIS — Z98.1 S/P CERVICAL SPINAL FUSION: ICD-10-CM

## 2021-08-16 PROCEDURE — 97162 PT EVAL MOD COMPLEX 30 MIN: CPT

## 2021-08-16 PROCEDURE — 97110 THERAPEUTIC EXERCISES: CPT

## 2021-08-16 RX ORDER — FUROSEMIDE 40 MG/1
40 TABLET ORAL 2 TIMES DAILY
Qty: 180 TABLET | Refills: 3 | Status: SHIPPED | OUTPATIENT
Start: 2021-08-16 | End: 2022-01-13 | Stop reason: SDUPTHER

## 2021-08-16 RX ORDER — CARVEDILOL 12.5 MG/1
12.5 TABLET ORAL 2 TIMES DAILY
Qty: 180 TABLET | Refills: 3 | Status: SHIPPED | OUTPATIENT
Start: 2021-08-16 | End: 2022-02-23 | Stop reason: SDUPTHER

## 2021-08-16 RX ORDER — OXYCODONE HYDROCHLORIDE AND ACETAMINOPHEN 5; 325 MG/1; MG/1
1 TABLET ORAL DAILY PRN
Qty: 7 TABLET | Refills: 0 | Status: SHIPPED | OUTPATIENT
Start: 2021-08-17 | End: 2021-08-17 | Stop reason: RX

## 2021-08-16 NOTE — TELEPHONE ENCOUNTER
Medication: Sitagliptin, Furosemide, Carvedilol   Last visit: 7/22/2021  Next visit: 9/8/2021  Last refill:   Pharmacy: [facility]

## 2021-08-16 NOTE — PROGRESS NOTES
[address] [facility]  [address] [facility]         Phone: [phone]       Fax: [phone]    Physical Therapy Spine Evaluation    Date:  2021  Patient: Tejas Green  : 1950  MRN: [id number]  Physician: Júnior Flores DO     Medical Diagnosis: Cervical spinal fusion ( Z98.1)  Clinical Diagnosis: Neck pain with mobility deficits   Onset date: 2021   Next 's appt.: TBD  PT Visit Information  PT Insurance Information: [employer] Medicare - unlimited/based on medical necessity $40.00 co-pay  Total # of Visits Approved: 18  Total # of Visits to Date: 1  No Show: 0  Canceled Appointment: 0     Subjective  CC: Neck Pain   HPI: (2021) History of neck pain due a cervical fusion. Pt presented with cervical stenosis with myelopathy and underwent a posterior C3-6 laminectomy,partial C7 laminectomy, fusion C3-C6 on 2021. Pt was discharged from the hospital and went to SNF for 2 weeks. She was discharged to home and had home care x 9-10 weeks/discharged last week. History of immobilization/cervical brace was removed at 8 weeks. Recently followed up with her surgeon as well as her PCP. Received a outpt PT prescription from both of them. PMHx: [] Unremarkable [] Diabetes [] HTN  [] Pacemaker   [] MI/Heart Problems [] Cancer [] Arthritis   [x] Other: Back pain; Diverticulosis of colon (without mention of hemorrhage); Intestinal or peritoneal adhesions with obstruction (postoperative) (postinfection) ([address] Restless legs syndrome (RLS); GERD (gastroesophageal reflux disease); Other abnormal glucose; Allergic rhinitis, cause unspecified; Vitamin D deficiency; Muscle strain; History of peritonitis; History of ovarian cyst; Bowel obstruction ([address] History of MI (myocardial infarction);  Wears glasses; Cellulitis; Kidney infection; Cellulitis; C. difficile diarrhea; Pre-diabetes; TIA (transient ischemic attack); PONV (postoperative nausea and vomiting); MDRO (multiple drug resistant organisms) resistance; Wellness examination; GERD (gastroesophageal reflux disease); Snores; COVID-19; Uses walker; 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[facility] Cardiac murmur;  History of CHF (congestive heart failure), CAD, blood clots, hyperlipidemia, hypertension, CVA                [x] Refer to full medical chart  In EPIC     Co morbidities  [x] Obesity [] Dialysis  [] Other:   [] Asthma/COPD [] Dementia [] Other:   [x] Stroke [] Sleep apnea [] Other:   [] Vascular disease [] Rheumatic disease [] Other:     Tests: [] X-Ray: [] MRI:  [] Other:     Medications: [x] Refer to full medical record [] None [] Other:  Allergies:      [x] Refer to full medical record [] None [] Other:     Normal Deficit Comments   Follows commands [x] []    Vision [] [x] (L) eye visual impaired    Hearing [x] []    Orientation [x] []      Pain  Pain:  [x] Yes   [] No      Location: (R) posterior neck region/base of the skull to the base of the neck without radiculopathy   Pain Rating: (0-10 scale) 7/10   Worst: 7/10   Best: 2/10   Symptoms:  [x] Improving [] Worsening       [] Same  Descriptors: \"Constant\" aching   Aggravating factors: ADLs that involve the cervical motion(s)/UEs   Relieving factors: Rest/Repositioning  Sleep: Disturbed   Other:     Function  Hand Dominance  [] Right  [x] Left  Working:  [] Normal Duty  [] Light Duty  [] Off D/T Condition  [x] Retired    [] Not Employed    []  Disability  [] Other:           Return to work:   Job/ADL Description:    ADL/IADL Previous level of function Current level of function Who currently assists the patient with task/Comments   Bathing  [x] Independent  [] Assist [] Independent  [x] Assist Spouse    Dress/grooming [x] Independent  [] Assist [] Independent  [x] Assist Spouse    Transfer/mobility [x] Independent  [] Assist [] Independent  [x] Assist Spouse    Feeding [x] Independent  [] Assist [x] Independent  [] Assist    Toileting [x] Independent  [] flexion  5/5 MMT   Elbow extension  5/5 MMT   Wrist flexion  5/5 MMT   Wrist extension  5/5 MMT     Additional Measures    Normal Deficit Comments   Sensation   [] []    Reflexes   [] []    Gross motor   [] []    Flexibility    [] [x] (L) pect major tightness @ 90 and 120  (R) pect major tightness @ 120    Special Tests   [] []     strength   [x] [] 35 lbs (R) = (L)    Other   [] []      Gait  Gait Prior level of function Current level of function    [x] Independent  [] Assist [x] Independent  [] Assist   Device: [] Independent [] Independent    [] Straight Cane [] Quad cane [] Straight Cane [] Quad cane    [] Standard walker [x] Rolling walker   [] 4 wheeled walker [] Standard walker [x] Rolling walker   [] 4 wheeled walker    [] Wheelchair [] Wheelchair     Assessment  Patient presented with moderate to severe irritability within the cervical region. Stiffness and limited mobility were noted within the neck and upper back region(s). Weakness was apparent with the cervical region/difficulty was noted with isometric cervical head \"nods\". Scapulothoracic weakness was noted bilaterally. Patient would continue to benefit from skilled physical therapy services in order to assist with pain control, ROM and strength for her cervical and upper back region(s) to allow to perform her ADLs with less pain/limitation. Problems  [x] ? Pain: 7/10 cervical region     [x] ? ROM: limited cervical/thoracic motions     [x] ? Strength:  cervical/scapulothoracic weakness    [x] ? Function: ADLs involving cervical and upper thoracic motions. [x] Postural Deviations Forward head, rounded shoulder(s), kyphotic, (R) shoulder higher than the (L)      Goals  STG: (to be met in 9 treatments)  1. Patient will report an average pain level of 5-6/10 within the cervical region at rest/ADLs. 2. Patient will demonstrate knowledge of fall prevention. LTG: (to be met in 18 treatments)  1.  Patient will report an average pain level of a 4-5/10 within the cervical region at rest/ADLs. 2. Patient will demonstrate improved ROM within all involved planes to assist with (I) function. 3. Patient will demonstrate improved strength with all involved planes to assist with (I) function. 4. Pt will demonstrate independence with her home exercise program.                Patient goals: To be able to become more mobile. Evaluation Complexity: Moderate     History: Back fusion L4-L5, increased pain within the sacrum with prolonging sitting, obesity and CVA  Exam: 7/10 pain level, limited cervical/scapulothoracic motion, weakness   Clinical Presentation: Patient's symptoms are evolving. Barriers to Learning: None     Rehab Potential:  [x] Good  [] Fair  [] Poor   Suggested Professional Referral:  [x] No  [] Yes:  Barriers to Goal Achievement[de-identified]  [x] No  [] Yes:  Domestic Concerns:  [x] No  [] Yes:    Pt. Education:  [] Plans/Goals, Risks/Benefits discussed  [x] Home exercise program - Hot Pack x 20 minutes followed by supine isometric \"head nods\" 10 sec hold x 10 reps, seated cervical extension x 10 reps and seated cervical flexion/rotation x 10 reps (Daily 1-2 sessions)    Method of Education: [x] Verbal  [x] Demo  [] Written  Comprehension of Education:  [] Verbalizes understanding. [] Demonstrates understanding. [x] Needs Review. [] Demonstrates/verbalizes understanding of HEP/Ed previously given.     Treatment Plan:  [x] Therapeutic Exercise   87718  [] Iontophoresis: 4 mg/mL Dexamethasone Sodium Phosphate  mAmin  62386   [] Therapeutic Activity  51000 [] Vasopneumatic cold with compression  30046    [] Gait Training   64525 [] Ultrasound   79470   [] Neuromuscular Re-education  50523 [] Electrical Stimulation Unattended  54363   [] Manual Therapy  50081 [] Electrical Stimulation Attended  87294   [x] Instruction in HEP  [] Lumbar/Cervical Traction  09769   [] Aquatic Therapy   92453 [x] Cold/hotpack    [] Massage   74888      [] Dry Needling, 1 or 2 muscles  10263   [] Biofeedback, first 15 minutes   99934  [] Biofeedback, additional 15 minutes   76669 [] Dry Needling, 3 or more muscles  18876     []  Medication allergies reviewed for use of    Dexamethasone Sodium Phosphate 4mg/ml     with iontophoresis treatments. Pt is not allergic. Frequency: 2-3 x/week for 18 visits    Todays Treatment:  Modalities:   Precautions:  Exercises:  Exercise Reps/ Time Weight/ Level Comments                                     Treatment Charges: Mins Units   [x] Evaluation       []  Low       [x]  Moderate       []  High 45 1   []  Modalities     [x]  Ther Exercise 15 1   []  Manual Therapy     []  Ther Activities     []  Aquatics     []  Neuromuscular     []  Gait Training     []  Dry Needling           1-2 muscles     []  Dry Needling           3 or more          muscles     [] Vasocompression     []  Other         TOTAL TREATMENT TIME: 60    Time in: 1400   Time Out: 1500    Electronically signed by: Jaciel Zhong PT DPT        Physician Signature:________________________________Date:__________________  By signing above or cosigning this note, I have reviewed this plan of care and certify a need for medically necessary rehabilitation services.      *PLEASE SIGN ABOVE AND FAX BACK ALL PAGES*

## 2021-08-16 NOTE — TELEPHONE ENCOUNTER
Patient calling for refill of oxycodone.     Date of last fill: 8.10.2021  Surgery: 5.21.2021  Time elapsed since last surgery: 13 weeks  Date of next follow up appointment: 8.31.2021     Pt notified response time for refills is 24-48 hours

## 2021-08-17 DIAGNOSIS — Z98.1 S/P CERVICAL SPINAL FUSION: ICD-10-CM

## 2021-08-17 RX ORDER — OXYCODONE HYDROCHLORIDE AND ACETAMINOPHEN 5; 325 MG/1; MG/1
1 TABLET ORAL DAILY PRN
Qty: 7 TABLET | Refills: 0 | Status: SHIPPED | OUTPATIENT
Start: 2021-08-17 | End: 2021-08-23

## 2021-08-17 NOTE — TELEPHONE ENCOUNTER
This medication was just filled yesterday-was the wrong pharmacy listed? If so, can we please call and cancel it and I will send it to the correct one?

## 2021-08-18 NOTE — PROGRESS NOTES
[facility] Fall Risk Assessment    Name: Leopoldo Clerk  Risk Factor Scale  Score   History of Falls [x] Yes  [] No 25  0 25   Secondary Diagnosis [] Yes  [x] No 15  0 0   Ambulatory Aid [] Furniture  [x] Crutches/cane/walker  [] None/bedrest/wheelchair/nurse 30  15  0 15   IV/Heparin Lock [] Yes  [x] No 20  0 0   Gait/Transferring [x] Impaired  [] Weak  [] Normal/bedrest/immobile 20  10  0 20   Mental Status [] Forgets limitations  [x] Oriented to own ability 15  0 0      Total: 60     Based on the Assessment score: check the appropriate box.     []  No intervention needed   Low =   Score of 0-24    []  Use standard prevention interventions Moderate =  Score of 24-44   [] Give patient handout and discuss fall prevention strategies   [] Establish goal of education for patient/family RE: fall prevention strategies    [x]  Use high risk prevention interventions High = Score of 45 and higher   [x] Give patient handout and discuss fall prevention strategies   [x] Establish goal of education for patient/family Re: fall prevention strategies   [x] Discuss lifeline / other resources    Electronically signed by:   Gibson Layton PT DPT  Date: 8/18/2021

## 2021-08-23 ENCOUNTER — HOSPITAL ENCOUNTER (OUTPATIENT)
Dept: PHYSICAL THERAPY | Age: 71
Setting detail: THERAPIES SERIES
Discharge: HOME | End: 2021-08-23
Payer: MEDICARE

## 2021-08-23 DIAGNOSIS — Z98.1 S/P CERVICAL SPINAL FUSION: ICD-10-CM

## 2021-08-23 PROCEDURE — 97110 THERAPEUTIC EXERCISES: CPT

## 2021-08-23 PROCEDURE — 97140 MANUAL THERAPY 1/> REGIONS: CPT

## 2021-08-23 RX ORDER — OXYCODONE HYDROCHLORIDE AND ACETAMINOPHEN 5; 325 MG/1; MG/1
1 TABLET ORAL DAILY PRN
Qty: 5 TABLET | Refills: 0 | Status: SHIPPED | OUTPATIENT
Start: 2021-08-24 | End: 2021-08-31

## 2021-08-23 NOTE — FLOWSHEET NOTE
[facility]   [address]   Phone: [phone]   Fax: [phone]     Physical Therapy Daily Treatment Note      Date:  2021  Patient Name:  Lucie Schwartz    :  1950  MRN: [id number]  Physician: Aminata Florian DO                    Medical Diagnosis: Cervical spinal fusion ( Z98.1)  Clinical Diagnosis: Neck pain with mobility deficits   Onset date: 2021       Next Dr's appt.: TBD  PT Insurance Information: [employer] Medicare - unlimited/based on medical necessity $40.00 co-pay    Visit# / total visits:   Cancels/No Shows: 0/0    Subjective:  Pt reports she is experiencing more pain in the (R) posterior shoulder and UT rather than the neck upon arrival. Pt arrives with walker and demonstrates forward head posture. Pain:  [x] Yes  [] No Location: (R) posterior neck region/base of the skull to the base of the neck without radiculopathy   Pain Rating: (0-10 scale) 5/10  Pain altered Tx:  [x] No  [] Yes  Action:   Comments:     Objective:  Modalities:   Precautions:  Exercises:  Exercise Reps/ Time Weight/ Level Completed  Today Comments          Seated        Cervical extension  10x      Cervical flexion  10x      Rotation  10x      Scapular retraction  2x10      Isometrics 3x5\"hold each   Side bend, rotation, flex    Chin tucks  10x5\"hold                           Passive Stretching   Cervical Motions x 10 minutes   Manual Therapy  supine with legs supported UT release, sub occipital release x 10'    Hyper volt  seated rhomboids, UTx8' post treatment       Assessment: [x] Progressing toward goals. Initiated treatment with cervical AROM to reduce tightness with fair tolerance secondary to stiffness and pain. Pt required demonstration and verbal cueing for all newly initiated exercises to perform with correct technique with good carry over. Implemented supine manual and light PROM to improve cervical mobility.  Pt reports tenderness along Neuromuscular     [] Vasocompression     [] Gait Training     [] Dry needling        [] 1 or 2 muscles        [] 3 or more muscles     []  Other     Total Treatment time 43 3     Frequency: 2-3 x/week for 18 visits    Time In: 1:22pm         Time Out: 2:05pm    Electronically signed by:  Mychal Bernardo PTA

## 2021-08-25 ENCOUNTER — HOSPITAL ENCOUNTER (OUTPATIENT)
Dept: PHYSICAL THERAPY | Age: 71
Setting detail: THERAPIES SERIES
Discharge: HOME | End: 2021-08-25
Payer: MEDICARE

## 2021-08-25 PROCEDURE — 97140 MANUAL THERAPY 1/> REGIONS: CPT

## 2021-08-25 PROCEDURE — 97110 THERAPEUTIC EXERCISES: CPT

## 2021-08-25 NOTE — FLOWSHEET NOTE
[facility]    [address]   Phone: [phone]   Fax: [phone]     Physical Therapy Daily Treatment Note      Date:  2021  Patient Name:  Elver Lucio    :  1950  MRN: [id number]  Physician: Henry To DO                    Medical Diagnosis: Cervical spinal fusion ( Z98.1)  Clinical Diagnosis: Neck pain with mobility deficits   Onset date: 2021       Next Dr's appt.: TBD  PT Insurance Information: [employer] Medicare - unlimited/based on medical necessity $40.00 co-pay    Visit# / total visits: 3/18  Cancels/No Shows: 0/0    Subjective:  Pt arrives reporting no pain in neck or R shoulder this date. Pt reports she was sore after last session that shortly dissipated. Pt states she did fall at home trying to reach for something in the fridge, however does not have any significant injuries. Pain:  [] Yes  [x] No Location:  Pain Rating: (0-10 scale) 0/10  Pain altered Tx:  [x] No  [] Yes  Action:   Comments:     Objective:  Modalities:   Precautions:  Exercises:  Exercise Reps/ Time Weight/ Level Comments         Seated       Cervical extension  10x     Cervical flexion  10x     Rotation  10x     Scapular retraction  2x10     Isometrics 3x5\"hold each  Side bend, rotation, flex    Chin tucks  10x5\"hold     Posterior shoulder rolls  10x            Shoulder   AAROM  Flex, abd  10xea                    Passive Stretching   Cervical Motions x 10 minutes    Pec stretch- therapist assisted x5'    Manual Therapy  Supine- hook lying position   UT release, sub occipital release x 10'    Hyper volt  seated rhomboids, UTx8' post treatment       Assessment: [x] Progressing toward goals. Initiated treatment with manual followed by passive cervical stretching to improve mobility with improved tolerance. Pt has increased tightness on L side of neck as compared to R.  Implemented supine shoulder AAROM cane exercise with a \"good stretch\" reported by pt. Pt continues to require verbal cueing to perform all seated exercises with corrected technique with good carry over. Implemented passive pec stretch to address forward flexed cervical posture and rounded shoulders. Continued to end with hyper volt to reduce muscular tension. Will continue to progress strengthening as tolerated in upcoming sessions. [] No change. [] Other:    [x] Patient would continue to benefit from skilled physical therapy services in order to: assist with pain control, ROM and strength for her cervical and upper back region(s) to allow to perform her ADLs with less pain/limitation. STG: (to be met in 9 treatments)  1. Patient will report an average pain level of 5-6/10 within the cervical region at rest/ADLs. 2. Patient will demonstrate knowledge of fall prevention. LTG: (to be met in 18 treatments)  1. Patient will report an average pain level of a 4-5/10 within the cervical region at rest/ADLs. 2. Patient will demonstrate improved ROM within all involved planes to assist with (I) function. 3. Patient will demonstrate improved strength with all involved planes to assist with (I) function. 4. Pt will demonstrate independence with her home exercise program.                Patient goals: To be able to become more mobile. Pt. Education:  [x] Yes  [] No  [] Reviewed Prior HEP/Ed  Method of Education: [x] Verbal  [] Demo  [] Written  Comprehension of Education:  [x] Verbalizes understanding. [] Demonstrates understanding. [x] Needs review. [] Demonstrates/verbalizes HEP/Ed previously given. Home exercise program - Hot Pack x 20 minutes followed by supine isometric \"head nods\" 10 sec hold x 10 reps, seated cervical extension x 10 reps and seated cervical flexion/rotation x 10 reps (Daily 1-2 sessions)        Plan: [x] Continue per plan of care.    [] Other:      Treatment Charges: Mins Units   []  Modalities     [x]  Ther Exercise 25 2   [x]  Manual Therapy 18 1   [] Ther Activities     []  Aquatics     []  Neuromuscular     [] Vasocompression     [] Gait Training     [] Dry needling        [] 1 or 2 muscles        [] 3 or more muscles     []  Other     Total Treatment time 43 3     Frequency: 2-3 x/week for 18 visits    Time In: 10:10 am         Time Out: 10:53 am    Electronically signed by:  Yuliet Orourke PTA

## 2021-08-27 ENCOUNTER — HOSPITAL ENCOUNTER (OUTPATIENT)
Dept: PHYSICAL THERAPY | Age: 71
Setting detail: THERAPIES SERIES
Discharge: HOME | End: 2021-08-27
Payer: MEDICARE

## 2021-08-27 PROCEDURE — 97110 THERAPEUTIC EXERCISES: CPT

## 2021-08-27 NOTE — PROGRESS NOTES
[facility]   [address]   Phone: [phone]   Fax: [phone]    Physical Therapy Daily Treatment Note    Date:  2021  Patient: Braydon Carrasco  : 1950  MRN: [id number]  Physician: Cindy Sebastian DO                    Medical Diagnosis: Cervical spinal fusion ( Z98.1)  Clinical Diagnosis: Neck pain with mobility deficits   Onset date: 2021       Next Dr's appt.: TBD  PT Visit Information  PT Insurance Information: [employer] Medicare - unlimited/based on medical necessity $40.00 co-pay  Total # of Visits Approved: 18  Total # of Visits to Date: 4  No Show: 0  Canceled Appointment: 0    Subjective  Patient stated that her  purchased a \"massager\" and she has been using it on both sides of her neck/felt it helping her symptoms. Pain  Pain:  [x]? Yes   []? No               Location: (R) posterior neck region/base of the skull to the base of the neck without radiculopathy   Pain Rating: (0-10 scale) 0/10   Worst: 5/10   Best: 0/10   Symptoms:  [x]? Improving   []? Worsening                 []? Same  Descriptors:  \"Intermittent\" aching   Aggravating factors: ADLs that involve the cervical motion(s)/UEs   Relieving factors: Rest/Repositioning  Sleep: Disturbed   Other:     Objective  Modalities: Hot pack with the supine cervical stretches/exercises  Precautions:   Exercises:  Exercise Reps/ Time Weight/ Level Comments   Standing (R) Trunk Side Bend  10 reps      Seated Pelvic Rocks  10 reps      Seated Scapular Retraction  10 reps      Seated \"Shrugs\"  20 reps      Supine Scapular Depression  10 reps      Seated Cervical Flexion/Rotation  10 reps      Seated AROM Flexion/Extension, Side to Side  10 reps   With each one     Passive Stretching   Supine Cervical Side Bend 10 reps with each side (pain free range)  Supine Cervical Extension 10 reps (pain free range)   Supine Cervical Side Bend to the (R) 30 sec hold x 3 reps   Supine (R) Pect Major Stretch @ 120 30 sec hold x 3 reps   Supine (L) Pect Major Stretch @ 90 and 120 30 sec hold x 3 reps     Specific Instructions for next treatment: Continue with passive stretching, AROM and isometric cervical strengthening. Pt. Education:  [] Yes  [] No  [] Reviewed Prior HEP/Ed  Method of Education: [] Verbal  [] Demo  [] Written  HEP:   Comprehension of Education:  [] Verbalizes understanding. [] Demonstrates understanding. [] Needs review. [] Demonstrates/verbalizes HEP/Ed previously given. Activity Tolerance  Patient tolerated treatment well     Assessment  Continued with exercise. Attempted to passively stretch the cervical and upper back muscles. Tightness was much more apparent on the (L). Goals  STG: (to be met in 9 treatments)  1. Patient will report an average pain level of 5-6/10 within the cervical region at rest/ADLs. 2. Patient will demonstrate knowledge of fall prevention.     LTG: (to be met in 18 treatments)  1. Patient will report an average pain level of a 4-5/10 within the cervical region at rest/ADLs. 2. Patient will demonstrate improved ROM within all involved planes to assist with (I) function. 3. Patient will demonstrate improved strength with all involved planes to assist with (I) function. 4. Pt will demonstrate independence with her home exercise program.                Patient goals: To be able to become more mobile. Plan: [x] Continue per plan of care.    [] Other:      Treatment Charges: Mins Units   []  Modalities     [x]  Ther Exercise 45 3   []  Manual Therapy     []  Ther Activities     []  Aquatics     []  Neuromuscular     [] Vasocompression     [] Gait Training     [] Dry needling        [] 1 or 2 muscles        [] 3 or more muscles     []  Other     Total Treatment time 45 3     Time In: 3717         Time Out: 6212    Electronically signed by:  Gibson Layton, PT DPT

## 2021-08-30 ENCOUNTER — HOSPITAL ENCOUNTER (OUTPATIENT)
Dept: PHYSICAL THERAPY | Age: 71
Setting detail: THERAPIES SERIES
Discharge: HOME | End: 2021-08-30
Payer: MEDICARE

## 2021-08-30 PROCEDURE — 97140 MANUAL THERAPY 1/> REGIONS: CPT

## 2021-08-30 PROCEDURE — 97110 THERAPEUTIC EXERCISES: CPT

## 2021-08-30 NOTE — FLOWSHEET NOTE
[address] [facility]   [address]   Phone: [phone]   Fax: [phone]     Physical Therapy Daily Treatment Note      Date:  2021  Patient Name:  Mabel Silva    :  1950  MRN: [id number]  Physician: Tiffany Gomez DO                    Medical Diagnosis: Cervical spinal fusion ( Z98.1)  Clinical Diagnosis: Neck pain with mobility deficits   Onset date: 2021       Next Dr's appt.: TBD  PT Insurance Information: [employer] Medicare - unlimited/based on medical necessity $40.00 co-pay    Visit# / total visits:   Cancels/No Shows: 0/0    Subjective:  Pt reports she has soreness however no pain upon arrival. Pt states she has been stretching at home and has noticed improvements with her cervical range of motion. Pt reports after prolonged activity her neck pain will reach to 5/10 pain. Pain:  [] Yes  [x] No Location:  Pain Rating: (0-10 scale) 0/10  Pain altered Tx:  [x] No  [] Yes  Action:   Comments:     Objective:  Modalities:   Precautions:  Exercises:  Exercise Reps/ Time Weight/ Level Comments         Seated       Cervical extension  10x     Cervical flexion  10x     Rotation  10x     Scapular retraction  2x10     UT stretch  3x20\"ea      Isometrics 3x5\"hold each  Side bend, rotation, flex    Chin tucks  10x5\"hold     Posterior shoulder rolls  10x      Shoulder \"shrugs\"  10x     Shoulder   AAROM  Flex  10x           Supine       Scapular depression  10x            Standing    GCA, used Walker for stability     Towel on wall flexion stretch  10x      Pec stretch in corner  3x20\"  Cueing for proper form      Passive Stretching   Cervical Motions x 10 minutes with Hot pack     Manual Therapy  Supine- hook lying position   UT release, sub occipital release x 5'    Hyper volt  seated rhomboids, UTx8' post treatment       Assessment: [x] Progressing toward goals.  Initiated treatment with cervical stretches followed by manual with improved tolerance this date. Able to implement more shoulder mobility and stretching exercises to reduce tightness. Pt required CGA for standing wall exercises due to poor balance and stability. Pt reports tightness and pain in posterior R shoulder that was alleviated post hypervolt. Pt states she \"feels good\" at the end of the session. [] No change. [] Other:    [x] Patient would continue to benefit from skilled physical therapy services in order to: assist with pain control, ROM and strength for her cervical and upper back region(s) to allow to perform her ADLs with less pain/limitation. STG: (to be met in 9 treatments)  1. Patient will report an average pain level of 5-6/10 within the cervical region at rest/ADLs. 2. Patient will demonstrate knowledge of fall prevention. LTG: (to be met in 18 treatments)  1. Patient will report an average pain level of a 4-5/10 within the cervical region at rest/ADLs. 2. Patient will demonstrate improved ROM within all involved planes to assist with (I) function. 3. Patient will demonstrate improved strength with all involved planes to assist with (I) function. 4. Pt will demonstrate independence with her home exercise program.                Patient goals: To be able to become more mobile. Pt. Education:  [x] Yes  [] No  [] Reviewed Prior HEP/Ed  Method of Education: [x] Verbal  [] Demo  [] Written  Comprehension of Education:  [x] Verbalizes understanding. [] Demonstrates understanding. [] Needs review. [] Demonstrates/verbalizes HEP/Ed previously given. Home exercise program - Hot Pack x 20 minutes followed by supine isometric \"head nods\" 10 sec hold x 10 reps, seated cervical extension x 10 reps and seated cervical flexion/rotation x 10 reps (Daily 1-2 sessions)        Plan: [x] Continue per plan of care.    [] Other:      Treatment Charges: Mins Units   []  Modalities     [x]  Ther Exercise 29 2   [x]  Manual Therapy 13 1   []  Ther Activities     [] Aquatics     []  Neuromuscular     [] Vasocompression     [] Gait Training     [] Dry needling        [] 1 or 2 muscles        [] 3 or more muscles     []  Other     Total Treatment time 42 3     Frequency: 2-3 x/week for 18 visits    Time In: 2:40 pm         Time Out: 3:22 pm    Electronically signed by:  Kimberly Waggoner PTA

## 2021-08-31 ENCOUNTER — OFFICE VISIT (OUTPATIENT)
Dept: NEUROSURGERY | Age: 71
End: 2021-08-31
Payer: MEDICARE

## 2021-08-31 VITALS
OXYGEN SATURATION: 97 % | BODY MASS INDEX: 30.12 KG/M2 | WEIGHT: 170 LBS | DIASTOLIC BLOOD PRESSURE: 84 MMHG | HEART RATE: 64 BPM | HEIGHT: 63 IN | SYSTOLIC BLOOD PRESSURE: 166 MMHG | RESPIRATION RATE: 14 BRPM

## 2021-08-31 DIAGNOSIS — Z98.1 S/P CERVICAL SPINAL FUSION: ICD-10-CM

## 2021-08-31 DIAGNOSIS — M48.02 STENOSIS OF CERVICAL SPINE WITH MYELOPATHY (HCC): ICD-10-CM

## 2021-08-31 DIAGNOSIS — R26.81 GAIT INSTABILITY: Primary | ICD-10-CM

## 2021-08-31 DIAGNOSIS — G99.2 STENOSIS OF CERVICAL SPINE WITH MYELOPATHY (HCC): ICD-10-CM

## 2021-08-31 PROCEDURE — 99213 OFFICE O/P EST LOW 20 MIN: CPT | Performed by: NURSE PRACTITIONER

## 2021-08-31 NOTE — PROGRESS NOTES
Dirk Cortés  [facility] [address] [facility] [address]  Dept: [phone]    Patient:  Chhaya Barone  YOB: 1950  Date: 8/31/21    The patient is a 80 y.o. female who presents today for consult of the following problems:     Chief Complaint   Patient presents with    Follow-up     Stenosis of cervical spine with myelopathy          HPI:     Chhaya Barone is a 80 y.o. female who presents for postop visit following posterior C3-C6 laminectomy fusion. Does report continued improvement to gait stability, ambulation. Improvement in paresthesias. Has been undergoing physical therapy to help with postoperative neck stiffness and this has helped quite a bit. Does utilize walker for ambulation, though she is able to independently ambulate. Improvement in dexterity. No saddle anesthesia, loss of bowel or bladder function. Incision has healed well. History:     Past Medical History:   Diagnosis Date    Allergic rhinitis, cause unspecified     Back pain     lumbar    Bowel obstruction (HCC)     history of due to scar tissue, resolved non-surgically    C. difficile diarrhea     CAD (coronary artery disease)     no stent needed per pt.  Dr. Kieran Yeboah did cath at  2005    Cardiac murmur     Cellulitis     left leg    Cellulitis 2017 August    leg left leg/bug bite    Cerebral artery occlusion with cerebral infarction [facility])     TIA 2014    COVID-19     ONE YR AGO IN 4/25/2020 fever and cough    Diverticulosis of colon (without mention of hemorrhage)     GERD (gastroesophageal reflux disease)     GERD (gastroesophageal reflux disease)     on rx    History of blood transfusion     approx 2020        History of CHF (congestive heart failure)     History of MI (myocardial infarction) 2005    thought due to a blood clot    History of ovarian cyst 1970    had oopherectomy holly    History of peritonitis 1968 due to ruptured appendix age 13    HTN (hypertension)     Hx of blood clots     right leg    Hyperlipidemia     Intestinal or peritoneal adhesions with obstruction (postoperative) (postinfection) ([address]     Kidney infection     renal failure/sepsis/spider bite    Lateral epicondylitis  of elbow     MDRO (multiple drug resistant organisms) resistance     c diff    Muscle strain     right posterior shoulder    Other abnormal glucose     PONV (postoperative nausea and vomiting)     dry heaves    Pre-diabetes     Restless legs syndrome (RLS)     Snores     no cpap    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)     TIA (transient ischemic attack) 2014    Uses walker     Vitamin D deficiency     Wears glasses     Wellness examination     last seen 2 weeks ago     Past Surgical History:   Procedure Laterality Date    ABDOMEN SURGERY  1976    benign tumor removed near remaining ovary, 1.5 pounds    APPENDECTOMY  1968    appendix ruptured, developed peritonitis    BACK SURGERY      BUNIONECTOMY Left     along with calcium deposits removed   [address]  2005    negative    CERVICAL FUSION  05/21/2021    POSTERIOR C3-6 LAMINECTOMY, PARTIAL C7 LAMINECTOMY, FUSION C3-C6, SILVERCORD    CERVICAL FUSION N/A 5/21/2021    POSTERIOR C3-6 LAMINECTOMY, PARTIAL C7 LAMINECTOMY, FUSION C3-C6, SILVERCORD performed by Linden Lew DO at [address]    12 INCHES REMOVED D/T OBSTRUCTION    COLONOSCOPY      CYST REMOVAL Right     right facial    HYSTERECTOMY  1973    taken as a result of recurring cysts    LUMBAR FUSION N/A 02/10/2020    LUMBAR L4-5 POSTERIOR  DECOMPRESSION INSTRUMENTATION FUSION WCEMENT AUGMENTATION/ performed by Arlette Burkitt, MD at [facility] N/A 06/17/2020    L5-S1 PLIF L4-L5 REVISION performed by Arlette Burkitt, MD at [facility] [address]  08/14/2014    FESS    OVARY REMOVAL  1970    UNILATERAL due to cyst    OVARY REMOVAL  1971    partial, due to cyst   [facility] SINUS SURGERY  2004    UPPER GASTROINTESTINAL ENDOSCOPY N/A 05/31/2019    EGD ESOPHAGOGASTRODUODENOSCOPY performed by Evangelina Gallegos MD at [address] [place] [address] N/A 08/05/2019    EGD BIOPSY performed by Paloma Chong MD at [place] [address] N/A 08/23/2019    EGD BIOPSY performed by Evangelina Gallegos MD at [place] [id number] Left 03/05/2019    WRIST OPEN REDUCTION INTERNAL FIXATION performed by Solis Johnson MD at [facility] History   Problem Relation Age of Onset    Stroke Mother     Diabetes Mother     Heart Disease Mother     High Blood Pressure Mother     Heart Disease Father     Heart Disease Brother     High Blood Pressure Brother     Heart Disease Maternal Grandmother     High Blood Pressure Sister      Current Outpatient Medications on File Prior to Visit   Medication Sig Dispense Refill    furosemide (LASIX) 40 MG tablet Take 1 tablet by mouth 2 times daily 180 tablet 3    carvedilol (COREG) 12.5 MG tablet Take 1 tablet by mouth 2 times daily 180 tablet 3    SITagliptin (JANUVIA) 100 MG tablet Take 0.5 tablets by mouth daily 90 tablet 3    atorvastatin (LIPITOR) 80 MG tablet Take 0.5 tablets by mouth nightly 90 tablet 3    lisinopril (PRINIVIL;ZESTRIL) 30 MG tablet Take 1 tablet by mouth daily 90 tablet 0    pramipexole (MIRAPEX) 0.5 MG tablet Take 1 tablet by mouth nightly 90 tablet 3    fenofibrate micronized (LOFIBRA) 134 MG capsule Take 1 capsule by mouth every morning (before breakfast) 90 capsule 3    pantoprazole (PROTONIX) 40 MG tablet Take 1 tablet by mouth 2 times daily (before meals) 180 tablet 3    aspirin 81 MG chewable tablet Take 81 mg by mouth daily Indications: pt reports intstructed to hold x 10 days prior to surgery      citalopram (CELEXA) 20 MG tablet Take 1 tablet by mouth daily (Patient taking differently: Take 20 mg by mouth daily Per pcp) 90 tablet 0    amLODIPine (NORVASC) 5 MG tablet Take 1 tablet by mouth daily 90 tablet 3    diphenhydrAMINE HCl (BENADRYL ALLERGY PO) Take 1 capsule by mouth daily Takes q HS      cyanocobalamin ([employer] VITAMIN B12) 1000 MCG tablet Take 1 tablet by mouth daily 30 tablet 3    ferrous sulfate (IRON 325) 325 (65 Fe) MG tablet Take 1 tablet by mouth 2 times daily 90 tablet 2    VITAMIN D, ERGOCALCIFEROL, PO Take by mouth      Lancets MISC 1 each by Does not apply route daily 100 each 3    blood glucose monitor strips Test 2 times a day & as needed for symptoms of irregular blood glucose. 100 strip 5    Calcium Carbonate-Vitamin D (CALCIUM 500/D) 500-125 MG-UNIT TABS Take 1 tablet by mouth 2 times daily       docusate sodium (COLACE) 100 MG capsule Take 1 capsule by mouth 2 times daily as needed for Constipation (Patient not taking: Reported on 2021) 60 capsule 1     No current facility-administered medications on file prior to visit. Social History     Tobacco Use    Smoking status: Former Smoker     Packs/day: 0.50     Years: 20.00     Pack years: 10.00     Types: Cigarettes     Start date: 1995     Quit date: 2017     Years since quittin.2    Smokeless tobacco: Never Used   Vaping Use    Vaping Use: Never used   Substance Use Topics    Alcohol use: No     Alcohol/week: 0.0 standard drinks    Drug use: No       Allergies   Allergen Reactions    Mobic [Meloxicam]     Bactrim [Sulfamethoxazole-Trimethoprim] Other (See Comments)     sepsis    Codeine Itching    Seasonal        Review of Systems  Constitutional: Negative for activity change and appetite change. HENT: Negative for ear pain and facial swelling. Eyes: Negative for discharge and itching. Respiratory: Negative for choking and chest tightness. Cardiovascular: Negative for chest pain and leg swelling. Gastrointestinal: Negative for nausea and abdominal pain. Endocrine: Negative for cold intolerance and heat intolerance. Genitourinary: Negative for frequency and flank pain. Musculoskeletal: Negative for myalgias and joint swelling. Skin: Negative for rash and wound. Allergic/Immunologic: Negative for environmental allergies and food allergies. Hematological: Negative for adenopathy. Does not bruise/bleed easily. Psychiatric/Behavioral: Negative for self-injury. The patient is not nervous/anxious. Physical Exam:      BP (!) 166/84   Pulse 64   Resp 14   Ht 5' 3\" (1.6 m)   Wt 170 lb (77.1 kg)   SpO2 97%   BMI 30.11 kg/m²   Estimated body mass index is 30.11 kg/m² as calculated from the following:    Height as of this encounter: 5' 3\" (1.6 m). Weight as of this encounter: 170 lb (77.1 kg). General:  Salvador Claude is a 80y.o. year old female who appears her stated age. HEENT: Normocephalic atraumatic. Neck supple. Chest: regular rate; pulses equal  Abdomen: Soft nontender nondistended.   Ext: DP and PT pulses 2+, good cap refill  Neuro    Mentation  Appropriate affect  Registration intact  Orientation intact  3 item recall intact  Judgement intact to situation    Cranial Nerves:   Pupils equal and reactive to light  Extraocular motion intact  Face and shrug symmetric  Tongue midline  No dysarthria  v1-3 sensation symmetric, masseter tone symmetric  Hearing symmetric    Sensation: Intact    Motor  L deltoid 5/5; R deltoid 5/5  L biceps 5/5; R biceps 5/5  L triceps 5/5; R triceps 5/5  L wrist extension 4+/5-secondary to previous wrist injury and subsequent surgery; R wrist extension 5/5  L intrinsics 5/5; R intrinsics 5/5     L iliopsoas 5/5 , R iliopsoas 5/5  L quadriceps 5/5; R quadriceps 5/5  L Dorsiflexion 5/5; R dorsiflexion 5/5  L Plantarflexion 5/5; R plantarflexion 5/5  L EHL 5/5; R EHL 5/5    Reflexes  L Brachioradialis 2+/4; R brachioradialis 2+/4  L Biceps 2+/4; R Biceps 2+/4  L Triceps 2+/4; R Triceps 2+/4  L Patellar 2+/4: R Patellar 2+/4  L Achilles 2+/4; R Achilles 2+/4    hoffmans L: neg  hoffmans R: neg  Clonus L: neg  Clonus R: neg  Babinski L: neg  Babinski R: neg    Studies Review:     X-ray cervical spine 7/19/2021 (images reviewed): FINDINGS:   Posterior interbody fusion rods and pedicular screws C3-4 5 and 6.  Slight   anterior subluxation C3-4 does not change with flexion or extension. Vertebral bodies otherwise normal in height and alignment.  Disc space   narrowing and spondylosis C5-6. no prevertebral swelling. Assessment and Plan:      1. Gait instability    2. 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[address]    3. S/P cervical spinal fusion          Plan: Patient overall doing well. Incision is healed well. Has had improvement in cervical range of motion. Wants to work on improving gait stability/ambulation tolerance. Referral provided for occupational therapy. Will see the patient back in approximately 3 months, upright x-rays prior. Followup: Return in about 3 months (around 11/30/2021), or if symptoms worsen or fail to improve. Prescriptions Ordered:  No orders of the defined types were placed in this encounter. Orders Placed:  Orders Placed This Encounter   Procedures    XR CERVICAL SPINE (2-3 VIEWS)     Standing Status:   Future     Standing Expiration Date:   8/31/2022     Scheduling Instructions:      Standing AP/lateral     Order Specific Question:   Reason for exam:     Answer:   s/p cervical fusion   [address]     Referral Priority:   Routine     Referral Type:   Eval and Treat     Referral Reason:   Specialty Services Required     Requested Specialty:   Occupational Therapy     Number of Visits Requested:   1        Electronically signed by MAIK Markham CNP on 9/2/2021 at 9:07 AM    Please note that this chart was generated using voice recognition Dragon dictation software.   Although every effort was made to ensure the accuracy of this automated transcription, some errors in transcription may have occurred.

## 2021-08-31 NOTE — PATIENT INSTRUCTIONS
Schedule a Vaccine  When you qualify to receive the vaccine, call the [facility]) COVID-19 Vaccination Hotline to schedule your appointment or to get additional information about the [facility]) locations which are offering the COVID-19 vaccine. To be 94% effective, it's important that you receive two doses of one of the COVID-19 vaccines. -If you are receiving the Gonzalez Peter vaccine, your second shot will be scheduled as close to 21 days after the first shot as possible. -If you are receiving the Moderna vaccine, your second shot will be scheduled as close to 28 days after the first shot as possible. [facility]) COVID-19 Vaccination Hotline: [phone]    Links to [facility]) website and [facility] website:    [email or web address]    [email or web address]

## 2021-09-01 ENCOUNTER — HOSPITAL ENCOUNTER (OUTPATIENT)
Dept: PHYSICAL THERAPY | Age: 71
Setting detail: THERAPIES SERIES
Discharge: HOME | End: 2021-09-01
Payer: MEDICARE

## 2021-09-01 DIAGNOSIS — R07.89 OTHER CHEST PAIN: ICD-10-CM

## 2021-09-01 PROCEDURE — 97110 THERAPEUTIC EXERCISES: CPT

## 2021-09-01 RX ORDER — NITROGLYCERIN 0.4 MG/1
0.4 TABLET SUBLINGUAL EVERY 5 MIN PRN
Qty: 75 TABLET | Refills: 3 | Status: SHIPPED | OUTPATIENT
Start: 2021-09-01

## 2021-09-01 NOTE — FLOWSHEET NOTE
[address] [facility] Outpatient Physical Therapy   [address]   Phone: [phone]   Fax: [phone]     Physical Therapy Daily Treatment Note      Date:  2021  Patient Name:  Tara Turcios    :  1950  MRN: [id number]  Physician: Celso Champion DO                    Medical Diagnosis: Cervical spinal fusion ( Z98.1)  Clinical Diagnosis: Neck pain with mobility deficits   Onset date: 2021       Next Dr's appt.: TBD  PT Insurance Information: A[employer]na Medicare - unlimited/based on medical necessity $40.00 co-pay    Visit# / total visits:   Cancels/No Shows: 0/0    Subjective:  Pt arrives reporting no pain and states she had a follow up with MD. Pt reports her MD is happy with her progress. She received a referral for OT. Pain:  [] Yes  [x] No Location:  Pain Rating: (0-10 scale) 0/10  Pain altered Tx:  [x] No  [] Yes  Action:   Comments:     Objective:  Modalities:   Precautions:  Exercises:  Exercise Reps/ Time Weight/ Level Comments Completed           Seated        Cervical extension  10x   x   Cervical flexion  10x   x   Rotation  10x   x   Scapular retraction  2x10   x   UT stretch  3x20\"ea    x   Isometrics 3x5\"hold each  Side bend, rotation, flex  x   Chin tucks  10x5\"hold   x   Posterior shoulder rolls  10x2   x   Shoulder \"shrugs\"  10x2   x   Shoulder   AAROM  Flex  10x             Rows  2x10 Red  x   Horizontal Abduction  2x10 Red  x          Supine        Scapular depression  10x              Standing    GCA, used Walker for stability      Towel on wall flexion stretch  10x       Pec stretch in corner  3x20\"  Cueing for proper form       Passive Stretching   Cervical Motions x 10 minutes with Hot pack     Manual Therapy  Supine- hook lying position   UT release, sub occipital release x 5'    Hyper volt  seated rhomboids, UTx8' post treatment - Not Today       Assessment: [x] Progressing toward goals.  Initiated treatment with cervical stretching and exercises Spieldenner    Exercises  Seated Cervical Rotation AROM - 1 x daily - 7 x weekly - 3 sets - 10 reps  Seated Passive Cervical Retraction - 1 x daily - 7 x weekly - 3 sets - 10 reps  Seated Cervical Sidebending AROM - 1 x daily - 7 x weekly - 3 sets - 10 reps  Seated Cervical Retraction and Extension - 1 x daily - 7 x weekly - 3 sets - 10 reps  Seated Scapular Retraction - 1 x daily - 7 x weekly - 3 sets - 10 reps  Seated Shoulder Horizontal Abduction with Resistance - 1 x daily - 7 x weekly - 3 sets - 10 reps  Seated Shoulder Row with Anchored Resistance - 1 x daily - 7 x weekly - 3 sets - 10 reps     Plan: [x] Continue per plan of care.    [] Other:      Treatment Charges: Mins Units   []  Modalities     [x]  Ther Exercise 40 3   [x]  Manual Therapy 5 --   []  Ther Activities     []  Aquatics     []  Neuromuscular     [] Vasocompression     [] Gait Training     [] Dry needling        [] 1 or 2 muscles        [] 3 or more muscles     []  Other     Total Treatment time 45 3     Frequency: 2-3 x/week for 18 visits    Time In: 12:45 pm         Time Out: 1:30 pm    Electronically signed by:  [place] PTA

## 2021-09-03 ENCOUNTER — HOSPITAL ENCOUNTER (OUTPATIENT)
Dept: PHYSICAL THERAPY | Age: 71
Setting detail: THERAPIES SERIES
Discharge: HOME | End: 2021-09-03
Payer: MEDICARE

## 2021-09-03 PROCEDURE — 97110 THERAPEUTIC EXERCISES: CPT

## 2021-09-03 NOTE — FLOWSHEET NOTE
[address] [facility] Outpatient Physical Therapy   [address]   Phone: [phone]   Fax: [phone]     Physical Therapy Daily Treatment Note    Date:  9/3/2021  Patient Name:  Richmond Leon    :  1950 MRN: [id number]  Physician: Oxana Franz DO                    Medical Diagnosis: Cervical spinal fusion ( Z98.1)  Clinical Diagnosis: Neck pain with mobility deficits   Onset date: 2021       Next Dr's appt.: TBD  PT Visit Information  PT Insurance Information: A[employer]na Medicare - unlimited/based on medical necessity $40.00 co-pay  Total # of Visits Approved: 18  Total # of Visits to Date: 7  No Show: 0  Canceled Appointment: 0    Subjective  Patient stated that she was relatively pain free with pain medication upon arrival. She stated that she has not experienced any pain within the cervical region since waking this morning. However, yesterday evening some pain/discomfort was apparent. Pain  Pain:  [x]? Yes   []? No               Location: (R) posterior neck region/base of the skull to the base of the neck without radiculopathy   Pain Rating: (0-10 scale) 0/10   Worst: 3/10   Best: 0/10   Symptoms:  [x]? Improving   []? Worsening                 []? Same  Descriptors:  \"Intermittent\" aching   Aggravating factors: ADLs that involve the cervical motion(s)/UEs   Relieving factors: Rest/Repositioning  Sleep: Disturbed   Other:     Objective  Modalities: Hot pack with the supine cervical stretches/exercises  Precautions:   Exercises:  Exercise Reps/ Time Weight/ Level Comments   Standing (R) Trunk Side Bend  10 reps        Seated Pelvic Rocks  10 reps        Seated Scapular Retraction  10 reps        Seated \"Shrugs\"  20 reps        Supine Scapular Depression  10 reps        Seated Cervical Flexion/Rotation  10 reps        Seated AROM Flexion/Extension, Side to Side  10 reps    With each one      Passive Stretching   Supine Cervical Side Bend 10 reps with each side (pain free range)  Supine Cervical Extension 10 reps (pain free range)   Supine Cervical Side Bend to the (R) 30 sec hold x 3 reps   Supine (R) Pect Major Stretch @ 120 30 sec hold x 3 reps   Supine (L) Pect Major Stretch @ 90 and 120 30 sec hold x 3 reps      Assessment  Continued with exercise. Demonstrated well with min cues. Goals  STG: (to be met in 9 treatments)  1. Patient will report an average pain level of 5-6/10 within the cervical region at rest/ADLs. 2. Patient will demonstrate knowledge of fall prevention. LTG: (to be met in 18 treatments)  1. Patient will report an average pain level of a 4-5/10 within the cervical region at rest/ADLs. 2. Patient will demonstrate improved ROM within all involved planes to assist with (I) function. 3. Patient will demonstrate improved strength with all involved planes to assist with (I) function. 4. Pt will demonstrate independence with her home exercise program.                Patient goals: To be able to become more mobile. Pt. Education:  [] Yes  [x] No  [] Reviewed Prior HEP/Ed  Method of Education: [] Verbal  [] Demo  [] Written  Comprehension of Education:  [] Verbalizes understanding. [] Demonstrates understanding. [] Needs review. [] Demonstrates/verbalizes       Plan: [x] Continue per plan of care.    [] Other:      Treatment Charges: Mins Units   []  Modalities     [x]  Ther Exercise 45 3   []  Manual Therapy     []  Ther Activities     []  Aquatics     []  Neuromuscular     [] Vasocompression     [] Gait Training     [] Dry needling        [] 1 or 2 muscles        [] 3 or more muscles     []  Other     Total Treatment time 45 3     Frequency: 2-3 x/week for 18 visits    Time In: 1300       Time Out: 3245    Electronically signed by:  Earnestine Sage PT  DPT

## 2021-09-08 ENCOUNTER — OFFICE VISIT (OUTPATIENT)
Dept: INTERNAL MEDICINE CLINIC | Age: 71
End: 2021-09-08
Payer: MEDICARE

## 2021-09-08 ENCOUNTER — HOSPITAL ENCOUNTER (OUTPATIENT)
Dept: PHYSICAL THERAPY | Age: 71
Setting detail: THERAPIES SERIES
Discharge: HOME | End: 2021-09-08
Payer: MEDICARE

## 2021-09-08 VITALS
SYSTOLIC BLOOD PRESSURE: 150 MMHG | DIASTOLIC BLOOD PRESSURE: 70 MMHG | BODY MASS INDEX: 31.79 KG/M2 | HEIGHT: 63 IN | WEIGHT: 179.4 LBS

## 2021-09-08 DIAGNOSIS — M48.02 STENOSIS OF CERVICAL SPINE WITH MYELOPATHY (HCC): ICD-10-CM

## 2021-09-08 DIAGNOSIS — I10 ESSENTIAL HYPERTENSION: ICD-10-CM

## 2021-09-08 DIAGNOSIS — G99.2 STENOSIS OF CERVICAL SPINE WITH MYELOPATHY (HCC): ICD-10-CM

## 2021-09-08 DIAGNOSIS — N17.9 ACUTE RENAL FAILURE, UNSPECIFIED ACUTE RENAL FAILURE TYPE (HCC): Primary | ICD-10-CM

## 2021-09-08 DIAGNOSIS — G25.81 RLS (RESTLESS LEGS SYNDROME): ICD-10-CM

## 2021-09-08 PROCEDURE — 97110 THERAPEUTIC EXERCISES: CPT

## 2021-09-08 PROCEDURE — 97140 MANUAL THERAPY 1/> REGIONS: CPT

## 2021-09-08 PROCEDURE — 99214 OFFICE O/P EST MOD 30 MIN: CPT | Performed by: INTERNAL MEDICINE

## 2021-09-08 RX ORDER — AMLODIPINE BESYLATE 10 MG/1
10 TABLET ORAL DAILY
Qty: 90 TABLET | Refills: 1 | Status: SHIPPED | OUTPATIENT
Start: 2021-09-08 | End: 2022-03-10 | Stop reason: SDUPTHER

## 2021-09-08 NOTE — PROGRESS NOTES
Subjective:      Patient ID: Be Escalera is a 80 y.o. female. HPI patient has H/o Multiple medical Problems , she has Dm, HTN, HLD,  , has CHF, Grade 1 HFPEF has H/o TIA In past , Multiple falls in past   She underwent surgery for her neck  Her neck pain is Better working with PT   feels that Restleg less is ok   She uses walker , feels that weakness, balance is improving  No new complaints    Review of Systems   Constitutional: Negative for activity change, appetite change, chills, diaphoresis, fatigue and fever. HENT: Negative for congestion, dental problem, drooling and ear discharge. Eyes: Negative for pain, discharge, redness and itching. Respiratory: Negative for apnea, cough, choking, chest tightness and shortness of breath. Cardiovascular: Negative for chest pain and leg swelling. Gastrointestinal: Negative for abdominal distention, abdominal pain, blood in stool, constipation and diarrhea. Endocrine: Negative for cold intolerance and heat intolerance. Genitourinary: Negative for difficulty urinating, dysuria, enuresis, flank pain and frequency. Musculoskeletal: Positive for arthralgias, back pain, gait problem (balance issues ), neck pain and neck stiffness. Negative for joint swelling. Skin: Negative for color change, pallor and rash. Neurological: Negative for dizziness, facial asymmetry, light-headedness, numbness and headaches. Psychiatric/Behavioral: Positive for dysphoric mood. Negative for agitation, behavioral problems, confusion and decreased concentration. Objective:   Physical Exam  Constitutional:       Appearance: She is well-developed. She is obese. She is not diaphoretic. HENT:      Head: Normocephalic and atraumatic. Mouth/Throat:      Pharynx: No oropharyngeal exudate. Eyes:      General: No scleral icterus. Right eye: No discharge. Left eye: No discharge.       Conjunctiva/sclera: Conjunctivae normal.      Pupils: Pupils are equal, round, and reactive to light. Neck:      Thyroid: No thyromegaly. Vascular: No JVD. Trachea: No tracheal deviation. Cardiovascular:      Rate and Rhythm: Normal rate. Heart sounds: Murmur (Systolic murmur in aortic area.) heard. No gallop. Pulmonary:      Effort: Pulmonary effort is normal. No respiratory distress. Breath sounds: Normal breath sounds. No stridor. No wheezing or rales. Chest:      Chest wall: No tenderness. Abdominal:      General: Bowel sounds are normal. There is no distension. Palpations: Abdomen is soft. Tenderness: There is no abdominal tenderness. There is no guarding or rebound. Musculoskeletal:         General: Normal range of motion. Cervical back: Normal range of motion and neck supple. Neurological:      Mental Status: She is alert and oriented to person, place, and time. Assessment / Plan:   1. Acute renal failure, unspecified acute renal failure type ([address]  Controlled    2. Essential hypertension  Stable  - amLODIPine (NORVASC) 10 MG tablet; Take 1 tablet by mouth daily  Dispense: 90 tablet; Refill: 1    3. RLS (restless legs syndrome)  Stable    4. Stenosis of cervical spine with myelopathy [facility])  S/p surgery  Patient work with PT  Uses walker        · Return in about 4 months (around 1/8/2022). · Reviewed prior labs and health maintenance. · Discussed use, benefit, and side effects of prescribed medications. Barriers to medication compliance addressed. All patient questions answered. Pt voiced understanding. MD [facility]  9/14/2021, 4:41 PM    Please note that this chart was generated using voice recognition Dragon dictation software. Although every effort was made to ensure the accuracy of this automated transcription, some errors in transcription may have occurred.         Visit Information    Have you changed or started any medications since your last visit including any over-the-counter medicines, vitamins, or herbal medicines? no   Are you having any side effects from any of your medications? -  no  Have you stopped taking any of your medications? Is so, why? -  no    Have you seen any other physician or provider since your last visit? Yes - Records Requested  Have you had any other diagnostic tests since your last visit? Yes - Records Requested  Have you been seen in the emergency room and/or had an admission to a hospital since we last saw you? No  Have you had your routine dental cleaning in the past 6 months? yes -     Have you activated your [employer] account? If not, what are your barriers?  Yes     Patient Care Team:  Brenda Sanchez MD as PCP - General (Internal Medicine)  Brenda Sanchez MD as PCP - [facility]  Randy Stark MD as Consulting Physician (Gastroenterology)    Medical History Review  Past Medical, Family, and Social History reviewed and does not contribute to the patient presenting condition    Health Maintenance   Topic Date Due    Diabetic foot exam  Never done    Diabetic retinal exam  Never done    DTaP/Tdap/Td vaccine (1 - Tdap) Never done    Shingles Vaccine (1 of 2) Never done    Flu vaccine (1) 09/01/2021    Annual Wellness Visit (AWV)  12/04/2021    A1C test (Diabetic or Prediabetic)  02/22/2022    Lipid screen  03/12/2022    Potassium monitoring  07/22/2022    Creatinine monitoring  07/22/2022    Breast cancer screen  05/06/2023    Colon cancer screen colonoscopy  10/07/2026    DEXA (modify frequency per FRAX score)  Completed    Pneumococcal 65+ years Vaccine  Completed    COVID-19 Vaccine  Completed    Hepatitis C screen  Completed    Hepatitis A vaccine  Aged Out    Hib vaccine  Aged Out    Meningococcal (ACWY) vaccine  Aged Out

## 2021-09-08 NOTE — FLOWSHEET NOTE
[facility]   [address]   Phone: [phone]   Fax: [phone]     Physical Therapy Daily Treatment Note    Date:  2021  Patient Name:  Khoi Call    :  1950 MRN: [id number]  Physician: Kasandra Posada DO                    Medical Diagnosis: Cervical spinal fusion ( Z98.1)  Clinical Diagnosis: Neck pain with mobility deficits   Onset date: 2021       Next Dr's appt.: TBD  PT Visit Information  PT Insurance Information: A[employer]na Medicare - unlimited/based on medical necessity $40.00 co-pay  Total # of Visits Approved: 18  Total # of Visits to Date: 8  No Show: 0  Canceled Appointment: 0    Subjective:  Pt continues to report she is pain free upon arrival, however pain will reach between 6-7/10 with prolonged standing activities. Pt states she has been compliant with HEP and has used heat at home that does help with pain symptoms. Pain  Pain:  [x] Yes   [] No               Location: (R) posterior neck region/base of the skull to the base of the neck without radiculopathy   Pain Rating: (0-10 scale) 0/10   Worst: 6-7/10   Best: 0/10   Symptoms:  [x] Improving   [] Worsening                 [] Same  Descriptors:  \"Intermittent\" aching   Aggravating factors: ADLs that involve the cervical motion(s)/UEs   Relieving factors: Rest/Repositioning  Sleep: Disturbed   Other:     Objective:  Modalities:   Precautions:  Exercises:  Exercise Reps/ Time Weight/ Level Comments Completed                Seated            Cervical extension  10x     x   Cervical flexion  10x     x   Rotation  10x     x   Scapular retraction  2x10     x   UT stretch  3x20\"ea         Isometrics 3x5\"hold each   Side bend, rotation, flex     Chin tucks  10x5\"hold     x   Posterior shoulder rolls  10x2     x   Shoulder \"shrugs\"  10x2     x   Shoulder   AAROM  Flex  10x2     x   Passive pec stretching in chair  3x20\"  Overpressure by therapist  x                      Rows 2x10 Red   x   Horizontal Abduction  2x10 Red   x   ER  2x10 Red   x          Supine            Scapular depression  10x                         Passive Stretching   Cervical Motions x 10 minutes with Hot pack      Manual Therapy  Supine- hook lying position   UT release, sub occipital release x 5'     Hyper volt  seated rhomboids, UTx8' post treatment         Assessment  Initiated treatment with PROM and manual with HP with positive relief reported post intervention. Continued with charted exercises with intermittent complaints of discomfort in L shoulder however, presents with overall good tolerance. Pt able to complete scapular theraband strengthening exercises with no increase of pain noted. Ended treatment with hypervolt to reduce muscular tension with good relief. Goals  STG: (to be met in 9 treatments)  1. Patient will report an average pain level of 5-6/10 within the cervical region at rest/ADLs. 2. Patient will demonstrate knowledge of fall prevention. LTG: (to be met in 18 treatments)  1. Patient will report an average pain level of a 4-5/10 within the cervical region at rest/ADLs. 2. Patient will demonstrate improved ROM within all involved planes to assist with (I) function. 3. Patient will demonstrate improved strength with all involved planes to assist with (I) function. 4. Pt will demonstrate independence with her home exercise program.                Patient goals: To be able to become more mobile. Pt. Education:  [x] Yes  [] No  [x] Reviewed Prior HEP/Ed  Method of Education: [x] Verbal  [] Demo  [] Written  Comprehension of Education:  [x] Verbalizes understanding. [] Demonstrates understanding. [] Needs review. [] Demonstrates/verbalizes       Plan: [x] Continue per plan of care.    [] Other:      Treatment Charges: Mins Units   []  Modalities     [x]  Ther Exercise 32 2   [x]  Manual Therapy 13 1   []  Ther Activities     []  Aquatics     []  Neuromuscular     [] Vasocompression     [] Gait Training     [] Dry needling        [] 1 or 2 muscles        [] 3 or more muscles     []  Other     Total Treatment time 45 3     Frequency: 2-3 x/week for 18 visits    Time In: 9:55 am       Time Out: 10:45 am     Electronically signed by: Mychal Bernardo PTA

## 2021-09-10 ENCOUNTER — TELEPHONE (OUTPATIENT)
Dept: NEUROSURGERY | Age: 71
End: 2021-09-10

## 2021-09-10 ENCOUNTER — HOSPITAL ENCOUNTER (OUTPATIENT)
Dept: PHYSICAL THERAPY | Age: 71
Setting detail: THERAPIES SERIES
Discharge: HOME | End: 2021-09-10
Payer: MEDICARE

## 2021-09-10 PROCEDURE — 97110 THERAPEUTIC EXERCISES: CPT

## 2021-09-10 NOTE — FLOWSHEET NOTE
[facility]   [address] [place] [address] [address]   Phone: [phone]   Fax: [phone]     Physical Therapy Daily Treatment Note    Date:  9/10/2021  Patient Name:  Tejas Green    :  1950 MRN: [id number]  Physician: Zaria                     Medical Diagnosis: Cervical spinal fusion ( Z98.1)  Clinical Diagnosis: Neck pain with mobility deficits   Onset date: 2021       Next Dr's appt.: TBD  PT Visit Information  PT Insurance Information: A[employer]na Medicare - unlimited/based on medical necessity $40.00 co-pay  Total # of Visits Approved: 18  Total # of Visits to Date: 9  No Show: 0  Canceled Appointment: 0     Subjective: Pt denies pain upon arrival. Pt states she is having increased stiffness on the L side of her neck. Pain  Pain:  [x] Yes   [] No               Location: (R) posterior neck region/base of the skull to the base of the neck without radiculopathy   Pain Rating: (0-10 scale) 0/10   Worst: 6-7/10   Best: 0/10   Symptoms:  [x] Improving   [] Worsening                 [] Same  Descriptors:  \"Intermittent\" aching   Aggravating factors: ADLs that involve the cervical motion(s)/UEs   Relieving factors: Rest/Repositioning  Sleep: Disturbed   Other:     Objective:  Modalities:   Precautions:  Exercises:  Exercise Reps/ Time Weight/ Level Comments Completed                Seated            Cervical extension  10x     x   Cervical flexion  10x     x   Rotation  10x     x   Scapular retraction  2x10     x   UT stretch  3x20\"ea         Isometrics 3x5\"hold each   Side bend, rotation, flex     Chin tucks  10x5\"hold     x   Posterior shoulder rolls  10x2     x   Shoulder \"shrugs\"  10x2     x   Shoulder   AAROM  Flex  10x2     x   Passive pec stretching in chair  3x20\"  Overpressure by therapist                        Rows  2x10 Red   x   Horizontal Abduction  2x10 Red   x   ER  2x10 Red   x          Supine            Scapular depression  10x Passive Stretching   Cervical Motions x 10 minutes with Hot pack      Manual Therapy  Supine- hook lying position   UT release, sub occipital release x 5'     Hyper volt  seated rhomboids, UTx8' post treatment- not today 9/10        Assessment  Continued initiating treatment with PROM/manual with HP with notable improvements in cervical range post intervention. Pt reports a challenge with theraband scapular strengthening exercises this date however, is able to complete. Pt reports no pain or difficulty with postural strengthening exercises this date. Goals  STG: (to be met in 9 treatments)  1. Patient will report an average pain level of 5-6/10 within the cervical region at rest/ADLs. 2. Patient will demonstrate knowledge of fall prevention. LTG: (to be met in 18 treatments)  1. Patient will report an average pain level of a 4-5/10 within the cervical region at rest/ADLs. 2. Patient will demonstrate improved ROM within all involved planes to assist with (I) function. 3. Patient will demonstrate improved strength with all involved planes to assist with (I) function. 4. Pt will demonstrate independence with her home exercise program.                Patient goals: To be able to become more mobile. Pt. Education:  [x] Yes  [] No  [x] Reviewed Prior HEP/Ed  Method of Education: [x] Verbal  [] Demo  [] Written  Comprehension of Education:  [x] Verbalizes understanding. [] Demonstrates understanding. [] Needs review. [] Demonstrates/verbalizes       Plan: [x] Continue per plan of care.    [] Other:      Treatment Charges: Mins Units   []  Modalities     [x]  Ther Exercise 40 3   [x]  Manual Therapy 5 --   []  Ther Activities     []  Aquatics     []  Neuromuscular     [] Vasocompression     [] Gait Training     [] Dry needling        [] 1 or 2 muscles        [] 3 or more muscles     []  Other     Total Treatment time 45 3     Frequency: 2-3 x/week for 18 visits    Time In: 12:45 pm

## 2021-09-10 NOTE — TELEPHONE ENCOUNTER
Patient requesting a order for physical therapy instead of occupational. Patient states therapy feels she will benefit more from physical therapy.  Please order

## 2021-09-13 ENCOUNTER — HOSPITAL ENCOUNTER (OUTPATIENT)
Dept: OCCUPATIONAL THERAPY | Age: 71
Setting detail: THERAPIES SERIES
End: 2021-09-13
Payer: MEDICARE

## 2021-09-14 DIAGNOSIS — G25.81 RLS (RESTLESS LEGS SYNDROME): ICD-10-CM

## 2021-09-14 RX ORDER — PRAMIPEXOLE DIHYDROCHLORIDE 0.5 MG/1
0.5 TABLET ORAL NIGHTLY
Qty: 90 TABLET | Refills: 3 | Status: SHIPPED | OUTPATIENT
Start: 2021-09-14 | End: 2022-01-06 | Stop reason: SDUPTHER

## 2021-09-14 ASSESSMENT — ENCOUNTER SYMPTOMS
BLOOD IN STOOL: 0
CONSTIPATION: 0
EYE PAIN: 0
BACK PAIN: 1
EYE DISCHARGE: 0
EYE REDNESS: 0
COLOR CHANGE: 0
ABDOMINAL DISTENTION: 0
CHOKING: 0
APNEA: 0
EYE ITCHING: 0
COUGH: 0
CHEST TIGHTNESS: 0
DIARRHEA: 0
ABDOMINAL PAIN: 0
SHORTNESS OF BREATH: 0

## 2021-09-15 ENCOUNTER — HOSPITAL ENCOUNTER (OUTPATIENT)
Dept: PHYSICAL THERAPY | Age: 71
Setting detail: THERAPIES SERIES
Discharge: HOME | End: 2021-09-15
Payer: MEDICARE

## 2021-09-15 PROCEDURE — 97110 THERAPEUTIC EXERCISES: CPT

## 2021-09-15 NOTE — PROGRESS NOTES
[facility]  [address] [place] [address]. [address]         Phone: [phone]       Fax: [phone]    Physical Therapy Progress Note Update    Date:  9/15/2021  Patient: Mariusz June  : 1950  MRN: [id number]  Physician: Galen Márquez DO     Medical Diagnosis: Cervical spinal fusion ( Z98.1)  Clinical Diagnosis: Neck pain with mobility deficits   Onset date: 2021   Next 's appt.: TBD  PT Visit Information  PT Insurance Information: A[employer]na Medicare - unlimited/based on medical necessity $40.00 co-pay  Total # of Visits Approved: 18  Total # of Visits to Date: 10  No Show: 0  Canceled Appointment: 0     Subjective  Pt stated that her cervical symptoms have improved. However, the upper back and shoulder(s) are still problematic. She has to still take frequent rest breaks while standing and working at [facility] level. Pt further stated that she fell within the last 48 hours. She was getting her clothes out of a draw and did not use her rolling walker. Left LE was involved/symptoms are improving.      Pain  Pain:  [x] Yes   [] No      Location: (R) posterior neck region/base of the skull to the base of the neck without radiculopathy   Pain Rating: (0-10 scale) 7/10   Worst: 7/10   Best: 2/10   Symptoms:  [] Improving [] Worsening       [x] Same  Descriptors: \"Constant\" aching   Aggravating factors: ADLs that involve the cervical motion(s)/UEs   Relieving factors: Rest/Repositioning  Sleep: No longer disturbed   Other:     Function  Hand Dominance  [] Right  [x] Left  Working:  [] Normal Duty  [] Light Duty  [] Off D/T Condition  [x] Retired    [] Not Employed    []  Disability  [] Other:           Return to work:   Job/ADL Description:    ADL/IADL Previous level of function Current level of function Who currently assists the patient with task/Comments   Bathing  [x] Independent  [] Assist [] Independent  [x] Assist Spouse    Dress/grooming [x] Independent  [] Assist [] Independent  [x] Assist Spouse    Transfer/mobility [x] Independent  [] Assist [] Independent  [x] Assist Spouse    Feeding [x] Independent  [] Assist [x] Independent  [] Assist    Toileting [x] Independent  [] Assist [x] Independent  [] Assist    Driving [x] Independent  [] Assist [] Independent  [x] Assist Spouse    Housekeeping [x] Independent  [] Assist [] Independent  [x] Assist Spouse    Grocery shop/meal prep [x] Independent  [] Assist [] Independent  [x] Assist Spouse      Objective  Modalities: Hot pack with the supine cervical stretches/exercises  Precautions:   Exercises:  Exercise Reps/ Time Weight/ Level Comments   Supine (B) shoulder horizontal abduction  10 reps   Following pect major stretch    Supine Cervical Side Bend  10 reps   Following Upper Trap Stretch/each side    Supine Cervical Flexion/Rotation  10 reps      Seated Neck Extension  10 reps      Seated Scapular Retraction  10 reps   Verbal cueing    Seated Thoracic Extension  10 reps      Seated Pelvic Rocks  10 reps   Verbal and Manual Cueing    Standing Trunk Extension with RW 10 reps      Standing (R) Trunk Side Bend  10 reps        Seated \"Shrugs\"  10 reps           Passive Stretching   Supine Cervical Side Bend to the (R) 30 sec hold x 3 reps   Supine (R) Pect Major Stretch @ 120 30 sec hold x 3 reps   Supine (L) Pect Major Stretch @ 90 and 120 30 sec hold x 3 reps   Supine Upper Trap Stretch 30 sec hold x 3 reps with each side     Assessment  Patient's symptoms are relatively unchanged compared to the initial evaluation. However, improved motor control was shown with scapulothoracic elevation and retraction. Tightness was still apparent throughout the neck/upper back. Flexed posture is still problematic along with limited active motion within the neck/upper back. Therefore, the patient would continue to benefit from skilled physical therapy services in order to further assist with his functional impairments/limitations. Goals  STG: (to be met in 9 treatments)  1. Patient will report an average pain level of 5-6/10 within the cervical region at rest/ADLs. (Not Met)  2. Patient will demonstrate knowledge of fall prevention. (Not Met)        Patient goals: To be able to become more mobile. Patient demonstrated improvement from condition with _0_ of_2_ short term goals     Comments/Function: Relatively unchanged from the initial evaluation. PT Education: [] Home Exercise Program  Comprehension [] Good [] Fair [] Poor  [x] Plans/Goals developed/discussed with pt/family [] Other    Recommendations: Continue with current prescription to further address her functional impairments/limitations noted above. [] Patient is Discharged At This Time Unless Further Orders Are Received. New Script Needed To Continue Treatment: [x] No   [] Yes  (visits left on current prescription:       8        ) Please sign orders at the bottom of this page and return by faxing. Thank you. Current Treatment:  [x] Therapeutic Exercise    [] Modalities                [] Work Conditioning  [] Therapeutic Activity    [] Ultrasound  [] Electrical Stimulation  [] Gait Training     [] Massage       [] Lumbar/Cervical Traction  [] Neuromuscular Re-education [x] Cold/hotpack [] Iontophoresis: 4 mg/mL  [x] Instruction in Home Exercise Program                     Dexamethasone Sodium  [] Manual Therapy             Phosphate 40-80 mAmin  [] Aquatic Therapy                                 [] Vaso Pneumatic compression  [] Other:  More objective information is available upon request.    Medicare/Regulatory Requirements:  I have reviewed this plan of care and certify a need for medically necessary rehabilitation services.   [] Physician Signature                                                   Date:       Thank you for your referral                    Electronically signed by: Perez Cerda, PT DPT    [facility]) @ [facility]  [address] [facility] [address]  [place], [address] [facility]  Phone [phone]  Fax [phone]    Treatment Charges: Mins Units   [] Evaluation       []  Low       []  Moderate       []  High     []  Modalities     [x]  Ther Exercise 45 3   []  Manual Therapy     []  Ther Activities     []  Aquatics     []  Neuromuscular     []  Gait Training     []  Dry Needling           1-2 muscles     []  Dry Needling           3 or more          muscles     [] Vasocompression     [x]  Other: Re-Evaluation  15 0     TOTAL TREATMENT TIME: 60    Time in: 1045  Time Out: 3522

## 2021-09-21 ENCOUNTER — HOSPITAL ENCOUNTER (OUTPATIENT)
Dept: PHYSICAL THERAPY | Age: 71
Setting detail: THERAPIES SERIES
Discharge: HOME | End: 2021-09-21
Payer: MEDICARE

## 2021-09-21 PROCEDURE — 97110 THERAPEUTIC EXERCISES: CPT

## 2021-09-21 NOTE — FLOWSHEET NOTE
[facility] Out[facility]   [address]   Phone: [phone]   Fax: [phone]     Physical Therapy Daily Treatment Note    Date:  2021  Patient Name:  Salvador Claude    :  1950 MRN: [id number]  Physician: Joesph Kirkpatrick DO                    Medical Diagnosis: Cervical spinal fusion ( Z98.1)  Clinical Diagnosis: Neck pain with mobility deficits   Onset date: 2021       Next Dr's appt.: TBD  PT Visit Information  PT Insurance Information: A[employer]na Medicare - unlimited/based on medical necessity $40.00 co-pay  Total # of Visits Approved: 18  Total # of Visits to Date:11   No Show: 0  Canceled Appointment: 0     Subjective: Pt denies pain upon arrival. Pt states she is having increased stiffness on the L side of her neck. Pain  Pain:  [x] Yes   [] No               Location: (R) posterior neck region/base of the skull to the base of the neck without radiculopathy   Pain Rating: (0-10 scale) 0/10   Worst: 4-5/10   Best: 0/10   Symptoms:  [x] Improving   [] Worsening                 [] Same  Descriptors:  \"Intermittent\" aching   Aggravating factors: ADLs that involve the cervical motion(s)/UEs   Relieving factors: Rest/Repositioning  Sleep: Disturbed   Other:     Objective:  Modalities: Hot pack with passive stretching   Precautions:  Exercises:  Exercise Reps/ Time Weight/ Level Comments             Seated          Cervical Flexion/Extension 10 reps        Thoracic Extension  10 reps        Scapular Retraction  10 reps        Pelvic Rocks  10 reps        (R) cervical side bend  10 reps       (L) cervical rotation  10 reps        \"Isometrics\" cervical motions 10 reps    Flexion, Extension, (R) side bend, (L) side       Passive Stretching   Supine Cervical Side Bend(s) with scapular depression to the opposite side 30 sec hold x 2 reps with each side        Assessment  Tenderness to the touch was still noted within the spinous process of C3-C7, pain @ end range with passive cervical side bend(s) and extension      Goals  STG: (to be met in 9 treatments)  1. Patient will report an average pain level of 5-6/10 within the cervical region at rest/ADLs. 2. Patient will demonstrate knowledge of fall prevention. LTG: (to be met in 18 treatments)  1. Patient will report an average pain level of a 4-5/10 within the cervical region at rest/ADLs. 2. Patient will demonstrate improved ROM within all involved planes to assist with (I) function. 3. Patient will demonstrate improved strength with all involved planes to assist with (I) function. 4. Pt will demonstrate independence with her home exercise program.                Patient goals: To be able to become more mobile. Pt. Education:  [] Yes  [] No  [] Reviewed Prior HEP/Ed  Method of Education: [] Verbal  [] Demo  [] Written  Comprehension of Education:  [] Verbalizes understanding. [] Demonstrates understanding. [] Needs review. [] Demonstrates/verbalizes       Plan: [x] Continue per plan of care.    [] Other:      Treatment Charges: Mins Units   []  Modalities     [x]  Ther Exercise 45 3   []  Manual Therapy     []  Ther Activities     []  Aquatics     []  Neuromuscular     [] Vasocompression     [] Gait Training     [] Dry needling        [] 1 or 2 muscles        [] 3 or more muscles     []  Other     Total Treatment time 45 3     Frequency: 2-3 x/week for 18 visits    Time In: 5798     Time Out: 9655     Electronically signed by: Brandon Zendejas PT, DPT

## 2021-09-23 ENCOUNTER — HOSPITAL ENCOUNTER (OUTPATIENT)
Dept: PHYSICAL THERAPY | Age: 71
Setting detail: THERAPIES SERIES
Discharge: HOME | End: 2021-09-23
Payer: MEDICARE

## 2021-09-23 PROCEDURE — 97110 THERAPEUTIC EXERCISES: CPT

## 2021-09-27 ENCOUNTER — HOSPITAL ENCOUNTER (OUTPATIENT)
Dept: PHYSICAL THERAPY | Age: 71
Setting detail: THERAPIES SERIES
Discharge: HOME | End: 2021-09-27
Payer: MEDICARE

## 2021-09-27 DIAGNOSIS — Z98.1 S/P CERVICAL SPINAL FUSION: ICD-10-CM

## 2021-09-27 PROCEDURE — 97110 THERAPEUTIC EXERCISES: CPT

## 2021-09-27 RX ORDER — OXYCODONE HYDROCHLORIDE AND ACETAMINOPHEN 5; 325 MG/1; MG/1
1 TABLET ORAL DAILY PRN
Qty: 5 TABLET | Refills: 0 | OUTPATIENT
Start: 2021-09-27 | End: 2021-10-04

## 2021-09-27 NOTE — TELEPHONE ENCOUNTER
Patient is outside of our typical prescribing window. Her surgery was in May, and she has not had any pain medicine refills for more than a month. Is she having new issues? Can offer sooner appointment if needed.

## 2021-09-27 NOTE — FLOWSHEET NOTE
reps    Flexion, Extension, (R) side bend, (L) side    Seated Cervical Extension  5 reps   AAROM with \"over pressure\" @ end range       Passive Stretching   Supine Cervical Side Bend(s) with scapular depression to the opposite side 30 sec hold x 3 reps with each side, PROM cervical side bend x 10 reps to the involved side, AROM cervical side bend x 5 reps to the involved side  Supine (L) Pect Major Stretch @ 120 and 90 degrees 30 sec hold x 3 reps with each one. Assessment   Continued with passive stretching. Significant tightness was still apparent within the (L) posterior cervical region. Attempted to perform active motion(s) that caused her neck, shoulder and spine to move toward the right. Demonstrated well with manual and verbal cueing. Goals  STG: (to be met in 9 treatments)  1. Patient will report an average pain level of 5-6/10 within the cervical region at rest/ADLs. 2. Patient will demonstrate knowledge of fall prevention. LTG: (to be met in 18 treatments)  1. Patient will report an average pain level of a 4-5/10 within the cervical region at rest/ADLs. 2. Patient will demonstrate improved ROM within all involved planes to assist with (I) function. 3. Patient will demonstrate improved strength with all involved planes to assist with (I) function. 4. Pt will demonstrate independence with her home exercise program.                Patient goals: To be able to become more mobile. Pt. Education:  [x] Yes  [] No  [] Reviewed Prior HEP/Ed  Method of Education: [x] Verbal  [] Demo  [] Written  ADLs - attempted to perform them in positions that causes her spine, shoulders and neck to have to move toward the right or maintain that given position for a period of time ( cooking, watching TV etc)   Comprehension of Education:  [] Verbalizes understanding. [] Demonstrates understanding. [x] Needs review. [] Demonstrates/verbalizes       Plan: [x] Continue per plan of care.    [] Other:      Treatment Charges: Mins Units   []  Modalities     [x]  Ther Exercise 45 3   []  Manual Therapy     []  Ther Activities     []  Aquatics     []  Neuromuscular     [] Vasocompression     [] Gait Training     [] Dry needling        [] 1 or 2 muscles        [] 3 or more muscles     []  Other     Total Treatment time 45 3     Frequency: 2-3 x/week for 18 visits    Time In: 0146    Time Out: 4157    Electronically signed by: Melisa Lambert PT, DPT

## 2021-09-28 DIAGNOSIS — F32.A DEPRESSION, UNSPECIFIED DEPRESSION TYPE: ICD-10-CM

## 2021-09-28 RX ORDER — CITALOPRAM 20 MG/1
20 TABLET ORAL DAILY
Qty: 90 TABLET | Refills: 3 | Status: SHIPPED | OUTPATIENT
Start: 2021-09-28 | End: 2022-01-03 | Stop reason: SDUPTHER

## 2021-09-29 ENCOUNTER — HOSPITAL ENCOUNTER (OUTPATIENT)
Dept: PHYSICAL THERAPY | Age: 71
Setting detail: THERAPIES SERIES
Discharge: HOME | End: 2021-09-29
Payer: MEDICARE

## 2021-09-29 PROCEDURE — 97110 THERAPEUTIC EXERCISES: CPT

## 2021-09-29 NOTE — FLOWSHEET NOTE
[address] [facility]    [address]   Phone: [phone]   Fax: [phone]     Physical Therapy Daily Treatment Note    Date:  2021  Patient Name:  Yahaira Olivares    :  1950 MRN: [id number]  Physician: Krystal Kline DO                    Medical Diagnosis: Cervical spinal fusion ( Z98.1)  Clinical Diagnosis: Neck pain with mobility deficits   Onset date: 2021       Next 's appt.: TBD  PT Visit Information  PT Insurance Information: [employer] Medicare - unlimited/based on medical necessity $40.00 co-pay  Total # of Visits Approved: 18  Total # of Visits to Date: 14  No Show: 0  Canceled Appointment: 0     Subjective  Patient reports she was reaching for something in the freezer and lost her balance. She feel backwards,hit her head on cupboard, and landed on floor Monday night.  helped her back up. She states it has not caused her any more pain and has a bruise on left hand.      Pain  Pain:  [x] Yes   [] No               Location: (R) posterior neck region/base of the skull to the base of the neck without radiculopathy   Pain Rating: (0-10 scale) 5/10   Worst: 5/10   Best: 2/10   Symptoms:  [] Improving   [x] Worsening                 [] Same  Descriptors: \"Constant\" aching   Aggravating factors: ADLs that involve the cervical motion(s)/UEs   Relieving factors: Rest/Repositioning  Sleep: Disturbed   Other:     Objective:  Modalities: Hot pack with passive stretching   Precautions:  Exercises:  Exercise Reps/ Time Weight/ Level Comments   Supine Cervical Flexion/Rotation  10 reps        Seated Thoracic Extension   10 reps        Seated (R) Thoracic Rotation   10 reps     AAROM with \"over pressure\" @ end range    Scapular Retraction  10 reps        Pelvic Rocks  10 reps        (R) cervical side bend  10 reps       (L) cervical rotation  10 reps        \"Isometrics\" cervical motions 10 reps    Flexion, Extension, (R) side bend, (L) side    Seated Cervical Extension  5 reps   AAROM with \"over pressure\" @ end range       Passive Stretching   Supine Cervical Side Bend(s) with scapular depression to the opposite side 30 sec hold x 3 reps with each side, PROM cervical side bend x 10 reps to the involved side, AROM cervical side bend x 5 reps to the involved side  Supine (L) Pect Major Stretch @ 120 and 90 degrees 30 sec hold x 3 reps with each one. Assessment   Continued with passive stretching. Continued with tightness and tenderness in  L posterior cervical region this date. Patient required cueing for postural awareness in sitting while completing exercises. Goals  STG: (to be met in 9 treatments)  1. Patient will report an average pain level of 5-6/10 within the cervical region at rest/ADLs. 2. Patient will demonstrate knowledge of fall prevention. LTG: (to be met in 18 treatments)  1. Patient will report an average pain level of a 4-5/10 within the cervical region at rest/ADLs. 2. Patient will demonstrate improved ROM within all involved planes to assist with (I) function. 3. Patient will demonstrate improved strength with all involved planes to assist with (I) function. 4. Pt will demonstrate independence with her home exercise program.                Patient goals: To be able to become more mobile. Pt. Education:  [x] Yes  [] No  [] Reviewed Prior HEP/Ed  Method of Education: [x] Verbal  [] Demo  [] Written  ADLs - attempted to perform them in positions that causes her spine, shoulders and neck to have to move toward the right or maintain that given position for a period of time ( cooking, watching TV etc)   Comprehension of Education:  [] Verbalizes understanding. [] Demonstrates understanding. [x] Needs review. [] Demonstrates/verbalizes       Plan: [x] Continue per plan of care.    [] Other:      Treatment Charges: Mins Units   []  Modalities     [x]  Ther Exercise 45 3   []  Manual Therapy     []  Ther Activities     [] Aquatics     []  Neuromuscular     [] Vasocompression     [] Gait Training     [] Dry needling        [] 1 or 2 muscles        [] 3 or more muscles     []  Other     Total Treatment time 45 3       Time In: 7763 pm    Time Out: 110 pm    Electronically signed by: Luis F Renteria PTA

## 2021-10-04 ENCOUNTER — HOSPITAL ENCOUNTER (OUTPATIENT)
Dept: PHYSICAL THERAPY | Age: 71
Setting detail: THERAPIES SERIES
Discharge: HOME | End: 2021-10-04
Payer: MEDICARE

## 2021-10-04 PROCEDURE — 97110 THERAPEUTIC EXERCISES: CPT

## 2021-10-04 NOTE — FLOWSHEET NOTE
[facility]   [address]   Phone: [phone]   Fax: [phone]      Physical Therapy Daily Treatment Note    Date:  10/4/2021  Patient Name:  Nichole Martell    :  1950 MRN: [id number]  Physician: Taiwo Aiken DO                    Medical Diagnosis: Cervical spinal fusion ( Z98.1)  Clinical Diagnosis: Neck pain with mobility deficits   Onset date: 2021       Next Dr's appt.: TBD  PT Visit Information  PT Insurance Information: [employer] Medicare - unlimited/based on medical necessity $40.00 co-pay  Total # of Visits Approved: 18  Total # of Visits to Date: 15  No Show: 0  Canceled Appointment: 0     Subjective  Patient stated that her neck pain has improved since her last treatment session. Pain  Pain:  [x] Yes   [] No               Location: (R) posterior neck region/base of the skull to the base of the neck without radiculopathy   Pain Rating: (0-10 scale) 0/10   Worst: 2/10   Best: 0/10   Symptoms:  [x] Improving   [] Worsening                 [] Same  Descriptors:  \"Intermittent\" aching   Aggravating factors: ADLs that involve the cervical motion(s)/UEs   Relieving factors: Rest/Repositioning  Sleep: Disturbed   Other:     Objective:  Modalities: Hot pack with passive stretching   Precautions:  Exercises:  Exercise Reps/ Time Weight/ Level Comments   Supine Cervical Flexion/Rotation  10 reps        Supine Thoracic Extension   15 reps x 3 sets     Following the Pect Major Stretch    Seated (R) Thoracic Rotation   10 reps     AAROM with \"over pressure\" @ end range    Scapular Retraction  20 reps        Pelvic Rocks  15 reps        (R) cervical side bend  10 reps       (L) cervical rotation  10 reps        \"Isometrics\" cervical motions 10 reps    Flexion, Extension, (R) side bend, (L) side    Seated Cervical Extension  5 reps   AAROM with \"over pressure\" @ end range       Passive Stretching   Supine Cervical Side Bend(s) with scapular

## 2021-10-06 ENCOUNTER — HOSPITAL ENCOUNTER (OUTPATIENT)
Dept: PHYSICAL THERAPY | Age: 71
Setting detail: THERAPIES SERIES
Discharge: HOME | End: 2021-10-06
Payer: MEDICARE

## 2021-10-06 PROCEDURE — 97110 THERAPEUTIC EXERCISES: CPT

## 2021-10-06 NOTE — FLOWSHEET NOTE
[address] [facility]   [address]   Phone: [phone]   Fax: [phone]      Physical Therapy Daily Treatment Note    Date:  10/6/2021  Patient Name:  Lisa Bhatt    :  1950 MRN: [id number]  Physician: Gayle Green DO                    Medical Diagnosis: Cervical spinal fusion ( Z98.1)  Clinical Diagnosis: Neck pain with mobility deficits   Onset date: 2021       Next Dr's appt.: TBD  PT Visit Information  PT Insurance Information: [employer] Medicare - unlimited/based on medical necessity $40.00 co-pay  Total # of Visits Approved: 18  Total # of Visits to Date: 16  No Show: 0  Canceled Appointment: 0     Subjective  Patient stated that her symptoms have remained relatively unchanged from her previous treatment session. Pain  Pain:  [x] Yes   [] No               Location: (R) posterior neck region/base of the skull to the base of the neck without radiculopathy   Pain Rating: (0-10 scale) 0/10   Worst: 2/10   Best: 0/10   Symptoms:  [] Improving   [] Worsening                 [x] Same  Descriptors:  \"Intermittent\" aching   Aggravating factors: ADLs that involve the cervical motion(s)/UEs   Relieving factors: Rest/Repositioning  Sleep: Disturbed   Other:     Objective:  Modalities: Hot pack with passive stretching   Precautions:  Exercises:  Exercise Reps/ Time Weight/ Level Comments   Supine Cervical Flexion/Rotation  10 reps        Supine Thoracic Extension   15 reps x 3 sets     Following the Pect Major Stretch    Seated (R) Thoracic Rotation   10 reps     AAROM with \"over pressure\" @ end range    Scapular Retraction  20 reps        Pelvic Rocks  15 reps        (R) cervical side bend  10 reps       (L) cervical rotation  10 reps        Supine Cervical Flexion  10 reps       Seated Cervical Extension  10 reps   AAROM with \"over pressure\" @ end range    Seated Shrugs  10 reps      Seated Protraction  10 reps   With each UE      Passive Stretching   Supine Cervical Side Bend(s) with scapular depression to the opposite side 30 sec hold x 3 reps with each side, PROM cervical side bend x 10 reps to the involved side, AROM cervical side bend x 5 reps to the involved side  Supine (L) Pect Major Stretch @ 120 and 90 degrees 30 sec hold x 3 reps with each one. Assessment   Continued with exercise. Able to progress to isotonic strengthening with all neck motions. Improved spinal extension was noted within the seated position. Head/neck positioning appears to be aligning in a neutral position on the shoulders. Initiated additional scapular strengthening exercises. Good motor control was shown bilaterally. Goals  STG: (to be met in 9 treatments)  1. Patient will report an average pain level of 5-6/10 within the cervical region at rest/ADLs. 2. Patient will demonstrate knowledge of fall prevention. LTG: (to be met in 18 treatments)  1. Patient will report an average pain level of a 4-5/10 within the cervical region at rest/ADLs. 2. Patient will demonstrate improved ROM within all involved planes to assist with (I) function. 3. Patient will demonstrate improved strength with all involved planes to assist with (I) function. 4. Pt will demonstrate independence with her home exercise program.                Patient goals: To be able to become more mobile. Pt. Education:  [] Yes  [] No  [] Reviewed Prior HEP/Ed  Method of Education: [] Verbal  [] Demo  [] Written  Comprehension of Education:  [] Verbalizes understanding. [] Demonstrates understanding. [] Needs review. [] Demonstrates/verbalizes       Plan: [x] Continue per plan of care.    [] Other:      Treatment Charges: Mins Units   []  Modalities     [x]  Ther Exercise 45 3   []  Manual Therapy     []  Ther Activities     []  Aquatics     []  Neuromuscular     [] Vasocompression     [] Gait Training     [] Dry needling        [] 1 or 2 muscles        [] 3 or more muscles     []

## 2021-10-08 ENCOUNTER — HOSPITAL ENCOUNTER (OUTPATIENT)
Dept: PHYSICAL THERAPY | Age: 71
Setting detail: THERAPIES SERIES
Discharge: HOME | End: 2021-10-08
Payer: MEDICARE

## 2021-10-08 PROCEDURE — 97110 THERAPEUTIC EXERCISES: CPT

## 2021-10-08 NOTE — FLOWSHEET NOTE
[facility]   [address]   Phone: [phone]   Fax: [phone]      Physical Therapy Daily Treatment Note    Date:  10/8/2021  Patient Name:  Sixto Calhoun    :  1950 MRN: [id number]  Physician: Kimi Solorio DO                    Medical Diagnosis: Cervical spinal fusion ( Z98.1)  Clinical Diagnosis: Neck pain with mobility deficits   Onset date: 2021       Next 's appt.: TBD  PT Visit Information  PT Insurance Information: [employer] Medicare - unlimited/based on medical necessity $40.00 co-pay  Total # of Visits Approved: 18  Total # of Visits to Date: 16  No Show: 0  Canceled Appointment: 0     Subjective  Patient stated that her symptoms have continued to improve. She reported that she has been pain-free within the cervical region for the last 24 hours.      Pain  Pain:  [] Yes   [x] No               Location:  Pain Rating: (0-10 scale) 0/10   Worst: 0/10   Best: 0/10   Symptoms:  [x] Improving   [] Worsening                 [] Same  Descriptors:   Aggravating factors:   Relieving factors:  Sleep: Not Disturbed   Other:     Objective:  Modalities: Hot pack with passive stretching   Precautions:  Exercises:  Exercise Reps/ Time Weight/ Level Comments   Supine Cervical Flexion/Rotation  10 reps        Supine Thoracic Extension   15 reps x 3 sets     Following the Pect Major Stretch    Seated (R) Thoracic Rotation   10 reps     AAROM with \"over pressure\" @ end range    Scapular Retraction  20 reps        Pelvic Rocks  15 reps        (R) cervical side bend  10 reps       (L) cervical rotation  10 reps        Supine Cervical Flexion  10 reps       Seated Cervical Extension  10 reps   AAROM with \"over pressure\" @ end range    Seated Shrugs  10 reps      Seated Protraction  10 reps   With each UE   Seated Trunk Flexion  10 reps      Seated Trunk Extension  10 reps         Passive Stretching   Supine Cervical Side Bend(s) with scapular depression to the opposite side 30 sec hold x 3 reps with each side, PROM cervical side bend x 10 reps to the involved side, AROM cervical side bend x 5 reps to the involved side  Supine (L) Pect Major Stretch @ 120 and 90 degrees 30 sec hold x 3 reps with each one. Seated neck Retraction/AP glide x  5 reps followed by neck extension x 5 reps x 2 sets     Assessment   Continued with exercise. Less tightness/greater motion was felt within the upper back/cervical planes    Goals  STG: (to be met in 9 treatments)  1. Patient will report an average pain level of 5-6/10 within the cervical region at rest/ADLs. 2. Patient will demonstrate knowledge of fall prevention. LTG: (to be met in 18 treatments)  1. Patient will report an average pain level of a 4-5/10 within the cervical region at rest/ADLs. 2. Patient will demonstrate improved ROM within all involved planes to assist with (I) function. 3. Patient will demonstrate improved strength with all involved planes to assist with (I) function. 4. Pt will demonstrate independence with her home exercise program.                Patient goals: To be able to become more mobile. Pt. Education:  [] Yes  [] No  [] Reviewed Prior HEP/Ed  Method of Education: [] Verbal  [] Demo  [] Written  Comprehension of Education:  [] Verbalizes understanding. [] Demonstrates understanding. [] Needs review. [] Demonstrates/verbalizes       Plan: [x] Continue per plan of care.    [] Other:      Treatment Charges: Mins Units   []  Modalities     [x]  Ther Exercise 45 3   []  Manual Therapy     []  Ther Activities     []  Aquatics     []  Neuromuscular     [] Vasocompression     [] Gait Training     [] Dry needling        [] 1 or 2 muscles        [] 3 or more muscles     []  Other     Total Treatment time 45 3     Time In: 1300    Time Out: 8135    Electronically signed by: Blanca Plummer PT, DPT

## 2021-10-11 ENCOUNTER — HOSPITAL ENCOUNTER (OUTPATIENT)
Dept: PHYSICAL THERAPY | Age: 71
Setting detail: THERAPIES SERIES
Discharge: HOME | End: 2021-10-11
Payer: MEDICARE

## 2021-10-11 PROCEDURE — 97110 THERAPEUTIC EXERCISES: CPT

## 2021-10-11 NOTE — PROGRESS NOTES
[facility] Outpatient Physical Therapy  [address] [place] [address]. [address]         Phone: [phone]       Fax: [phone]    Physical Therapy Progress Note Update/Discharge Summary    Date:  10/11/2021  Patient: Ena Hatch  : 1950  MRN: [id number]  Physician: Roxi Rosenberg DO     Medical Diagnosis: Cervical spinal fusion ( Z98.1)  Clinical Diagnosis: Neck pain with mobility deficits   Onset date: 2021   Next 's appt.: TBD  PT Visit Information  PT Insurance Information: [employer] Medicare - unlimited/based on medical necessity $40.00 co-pay  Total # of Visits Approved: 18  Total # of Visits to Date: 25  No Show: 0  Canceled Appointment: 0     Subjective  Patient stated that she felt the treatment sessions helped. Relatively pain-free within the neck and upper back with pain medication. She felt her neck motions are much greater now while performing her ADLs. Pain  Pain:  []? ? Yes   [x]? ? No               Location:  Pain Rating: (0-10 scale) 0/10   Worst: 0/10   Best: 0/10   Symptoms:  [x]? ? Improving   []? ? Worsening                 []? ? Same  Descriptors:   Aggravating factors:   Relieving factors:  Sleep: Not Disturbed   Other:     Function  Hand Dominance  [] Right  [x] Left  Working:  [] Normal Duty  [] Light Duty  [] Off D/T Condition  [x] Retired    [] Not Employed    []  Disability  [] Other:           Return to work:   Job/ADL Description:    ADL/IADL Previous level of function Current level of function Who currently assists the patient with task/Comments   Bathing  [x] Independent  [] Assist [] Independent  [x] Assist Spouse    Dress/grooming [x] Independent  [] Assist [x] Independent  [] Assist     Transfer/mobility [x] Independent  [] Assist [x] Independent  [] Assist    Feeding [x] Independent  [] Assist [x] Independent  [] Assist    Toileting [x] Independent  [] Assist [x] Independent  [] Assist    Driving [x] Independent  [] Assist [] Independent  [x] Assist Spouse    Housekeeping [x] Independent  [] Assist [] Independent  [x] Assist Spouse    Grocery shop/meal prep [x] Independent  [] Assist [] Independent  [x] Assist Spouse      Objective  Observation/Palpation   Normal Deficit Comments   Observation [] [x] Forward head, rounded shoulder(s), head side bend to the (L), kyphotic, (R) shoulder higher than the (L)    Palpation [x] [] No tenderness to the touch within the cervical region   Edema [] []    Scar [] []    Other [] []      Range of Motion  Spine - Range of Motion  Cervical  Flexion - WNL, Extension - 5 degrees, (R) side bend - 10 degrees, (L) side bend - 20 degrees  Thoracic  Extension - WNL, (B) rotation - lacking 50-60%, (B) abduction - lacking 70-80%     Scapulothoracic Motion  Elevation - WNL actively   Protraction - WNL actively   Retraction - WNL actively    Right Upper Extremity - Active ROM  WNL in all planes of the shoulder, elbow, wrist and hand. Left Upper Extremity - Active ROM  WNL in all planes of the shoulder, elbow, wrist and hand.      Strength    Right Upper Extremity - Strength    Shoulder flexion  5/5 MMT   Shoulder extension  4/5 MMT   Shoulder abduction  4/5 MMT   Shoulder IR  4/5 MMT   Shoulder ER  4/5 MMT   Elbow flexion  5/5 MMT   Elbow extension  5/5 MMT   Wrist flexion  5/5 MMT   Wrist extension  5/5 MMT     Left Upper Extremity - Strength    Shoulder flexion  5/5 MMT   Shoulder extension  4/5 MMT   Shoulder abduction  4/5 MMT   Shoulder IR  4/5 MMT   Shoulder ER  4/5 MMT   Elbow flexion  5/5 MMT   Elbow extension  5/5 MMT   Wrist flexion  5/5 MMT   Wrist extension  5/5 MMT     Additional Measures    Normal Deficit Comments   Sensation   [] []    Reflexes   [] []    Gross motor   [] []    Flexibility    [] [x] (L) pect major tightness @ 90 and 120  (R) pect major tightness @ 120    Special Tests   [] []     strength   [x] [] 35 lbs (R) = (L)    Other   [] []      Gait  Gait Prior level of function Current level of function [x] Independent  [] Assist [x] Independent  [] Assist   Device: [] Independent [] Independent    [] Straight Cane [] Quad cane [] Straight Cane [] Quad cane    [] Standard walker [x] Rolling walker   [] 4 wheeled walker [] Standard walker [x] Rolling walker   [] 4 wheeled walker    [] Wheelchair [] Wheelchair     Assessment  Patient improved as evidence by her reporting a reduction in pain within the cervical/upper back region. Improved cervical motion(s) and UE was noted. Goals  STG: (to be met in 9 treatments)  1. Patient will report an average pain level of 5-6/10 within the cervical region at rest/ADLs. (Met)   2. Patient will demonstrate knowledge of fall prevention. (Met)     LTG: (to be met in 18 treatments)  1. Patient will report an average pain level of a 4-5/10 within the cervical region at rest/ADLs. (Met)  2. Patient will demonstrate improved ROM within all involved planes to assist with (I) function. (Met)   3. Patient will demonstrate improved strength with all involved planes to assist with (I) function. (Met)  4. Pt will demonstrate independence with her home exercise program. (Met)                Patient demonstrated improvement from condition with _2_ of_2_ short term goals   Patient demonstrated improvement from condition with _4_ of_4__ long term goals     Comments/Function: Patient reported improvement with ADLs (dressing,bath)/less assist reported from her spouse. Pt. Education:  [] Plans/Goals, Risks/Benefits discussed  [x] Home exercise program - Written home exercise program was provided to include cervical/upper thoracic stretches along with active cervical motions. Method of Education: [x] Verbal  [] Demo  [x] Written  Comprehension of Education:  [x] Verbalizes understanding. [] Demonstrates understanding. [] Needs Review. [] Demonstrates/verbalizes understanding of HEP/Ed previously given. Recommendations: Current prescription completed.  Feel pt has maximized the benefits of physical therapy/she agreed. Provided her a written home exercise program/appeared to have a good understanding of it. [x] Patient is Discharged At This Time Unless Further Orders Are Received. New Script Needed To Continue Treatment: [x] No   [] Yes  (visits left on current prescription:       0        ) Please sign orders at the bottom of this page and return by faxing. Thank you. Current Treatment:  [x] Therapeutic Exercise    [] Modalities                [] Work Conditioning  [] Therapeutic Activity    [] Ultrasound  [] Electrical Stimulation  [] Gait Training     [] Massage       [] Lumbar/Cervical Traction  [] Neuromuscular Re-education [x] Cold/hotpack [] Iontophoresis: 4 mg/mL  [x] Instruction in Home Exercise Program                     Dexamethasone Sodium  [] Manual Therapy             Phosphate 40-80 mAmin  [] Aquatic Therapy                                 [] Vaso Pneumatic compression  [] Other:  More objective information is available upon request.    Medicare/Regulatory Requirements:  I have reviewed this plan of care and certify a need for medically necessary rehabilitation services.   [] Physician Signature                                                   Date:       Thank you for your referral                    Electronically signed by: Ren Brito PT DPT    [facility]) @ [facility]  [address] [place] [address], [address] [facility]  Phone [phone]  Fax [phone]    Treatment Charges: Mins Units   [] Evaluation       []  Low       []  Moderate       []  High     []  Modalities     [x]  Ther Exercise 15 1   []  Manual Therapy     []  Ther Activities     []  Aquatics     []  Neuromuscular     []  Gait Training     []  Dry Needling           1-2 muscles     []  Dry Needling           3 or more          muscles     [] Vasocompression     [x]  Other: Re-Evaluation 30 0     TOTAL TREATMENT TIME: 45    Time in: 1300  Time Out: 5

## 2021-10-12 ENCOUNTER — TELEPHONE (OUTPATIENT)
Dept: INTERNAL MEDICINE CLINIC | Age: 71
End: 2021-10-12

## 2021-10-12 DIAGNOSIS — M48.02 STENOSIS OF CERVICAL SPINE WITH MYELOPATHY (HCC): Primary | ICD-10-CM

## 2021-10-12 DIAGNOSIS — E78.5 HYPERLIPIDEMIA, UNSPECIFIED HYPERLIPIDEMIA TYPE: ICD-10-CM

## 2021-10-12 DIAGNOSIS — G99.2 STENOSIS OF CERVICAL SPINE WITH MYELOPATHY (HCC): Primary | ICD-10-CM

## 2021-10-12 DIAGNOSIS — M54.50 LOW BACK PAIN, UNSPECIFIED BACK PAIN LATERALITY, UNSPECIFIED CHRONICITY, UNSPECIFIED WHETHER SCIATICA PRESENT: ICD-10-CM

## 2021-10-12 RX ORDER — FENOFIBRATE 134 MG/1
134 CAPSULE ORAL
Qty: 90 CAPSULE | Refills: 3 | Status: SHIPPED | OUTPATIENT
Start: 2021-10-12 | End: 2022-01-13 | Stop reason: SDUPTHER

## 2021-10-12 NOTE — TELEPHONE ENCOUNTER
Patient calling for refill of Oxycodone.     Date of last fill: 06/30/21    Date of next follow up appointment: 07/20/21    Pt notified response time for refills is 24-48 hours show

## 2021-10-12 NOTE — TELEPHONE ENCOUNTER
Medication: Fenofibrate  Last visit: 9/8/21  Next visit: Visit date not found  Last refill: 07/01/21  Pharmacy: [facility]

## 2021-10-12 NOTE — TELEPHONE ENCOUNTER
Pt called & stated she finished her visits for PT referral for her neck at [facility]. Pt stated she would like a new referral to PT for back pain and to help her walk. Please advise.

## 2021-10-14 DIAGNOSIS — M54.50 LOW BACK PAIN, UNSPECIFIED BACK PAIN LATERALITY, UNSPECIFIED CHRONICITY, UNSPECIFIED WHETHER SCIATICA PRESENT: Primary | ICD-10-CM

## 2021-10-14 NOTE — TELEPHONE ENCOUNTER
Patient called back and states her  wants her to go to [address].   Ok to change to external referral?

## 2021-10-15 NOTE — TELEPHONE ENCOUNTER
Referral was not updated to be external when signed. I have updated the referral to be external for patient to go to [facility] per request.   Can you please sign the order and we can fax to [facility].     Please advise

## 2021-10-25 DIAGNOSIS — I10 HYPERTENSION, UNSPECIFIED TYPE: ICD-10-CM

## 2021-10-25 RX ORDER — LISINOPRIL 30 MG/1
30 TABLET ORAL DAILY
Qty: 90 TABLET | Refills: 0 | Status: SHIPPED | OUTPATIENT
Start: 2021-10-25 | End: 2022-01-21 | Stop reason: SDUPTHER

## 2021-10-25 NOTE — TELEPHONE ENCOUNTER
Medication: LISINOPRIL  Last visit: 9/8/21  Next visit: Visit date not found  Last refill: 7/22/21  [facility]

## 2021-10-27 ENCOUNTER — APPOINTMENT (OUTPATIENT)
Dept: PHYSICAL THERAPY | Age: 71
End: 2021-10-27
Payer: MEDICARE

## 2021-12-03 ENCOUNTER — OFFICE VISIT (OUTPATIENT)
Dept: INTERNAL MEDICINE CLINIC | Age: 71
End: 2021-12-03
Payer: MEDICARE

## 2021-12-03 VITALS
HEIGHT: 63 IN | BODY MASS INDEX: 32.46 KG/M2 | WEIGHT: 183.2 LBS | SYSTOLIC BLOOD PRESSURE: 100 MMHG | DIASTOLIC BLOOD PRESSURE: 62 MMHG

## 2021-12-03 DIAGNOSIS — I10 HYPERTENSION, UNSPECIFIED TYPE: ICD-10-CM

## 2021-12-03 DIAGNOSIS — E11.59 TYPE 2 DIABETES MELLITUS WITH OTHER CIRCULATORY COMPLICATION, WITHOUT LONG-TERM CURRENT USE OF INSULIN (HCC): ICD-10-CM

## 2021-12-03 DIAGNOSIS — G25.81 RLS (RESTLESS LEGS SYNDROME): Primary | ICD-10-CM

## 2021-12-03 DIAGNOSIS — F33.1 MAJOR DEPRESSIVE DISORDER, RECURRENT, MODERATE (HCC): ICD-10-CM

## 2021-12-03 PROCEDURE — 99214 OFFICE O/P EST MOD 30 MIN: CPT | Performed by: INTERNAL MEDICINE

## 2021-12-03 NOTE — ED PROVIDER NOTES
due to scar tissue, resolved non-surgically    CAD (coronary artery disease)     Cellulitis     left leg    Cellulitis 2017 August    leg left leg/bug bite    Diverticulosis of colon (without mention of hemorrhage)     GERD (gastroesophageal reflux disease)     History of MI (myocardial infarction) 2005    thought due to a blood clot    History of ovarian cyst 1970    had oopherectomy    History of peritonitis 1968    due to ruptured appendix age 13    HTN (hypertension)     Hx of blood clots     right leg    Hyperlipidemia     Intestinal or peritoneal adhesions with obstruction (postoperative) (postinfection)     Kidney infection     renal failure/sepsis/spider bite    Lateral epicondylitis  of elbow     Muscle strain     right posterior shoulder    Other abnormal glucose     Restless legs syndrome (RLS)     Vitamin D deficiency     Wears glasses      SURGICAL HISTORY       Past Surgical History:   Procedure Laterality Date    ABDOMEN SURGERY  1976    benign tumor removed near remaining overy, 1.5 pounds    APPENDECTOMY  1968    appendix ruptured, developed peritonitis    BUNIONECTOMY Left     along with calcium deposits removed   [address]  2005    negative    COLECTOMY  1969    12 INCHES REMOVED D/T OBSTRUCTION    COLONOSCOPY      CYST REMOVAL Right     right facial    HYSTERECTOMY  1973    taken as a result of recurring cysts    OTHER SURGICAL HISTORY  8/14/14    FESS    OVARY REMOVAL  1970    UNILATERAL due to cyst    OVARY REMOVAL  1971    partial, due to cyst    SINUS SURGERY  2004     CURRENT MEDICATIONS       Previous Medications    APIXABAN (ELIQUIS) 5 MG TABS TABLET    Take 1 tablet by mouth 2 times daily    ASPIRIN 81 MG CHEWABLE TABLET    Take 81 mg by mouth daily    ATORVASTATIN (LIPITOR) 80 MG TABLET    Take 1 tablet by mouth nightly    CALCIUM CARBONATE-VITAMIN D (CALCIUM 500/D) 500-125 MG-UNIT TABS    Take 1 tablet by mouth evidence of pulmonary embolism or acute pulmonary abnormality. XR CHEST PORTABLE   Final Result   No acute process. LABS: All lab results were reviewed by myself, and all abnormals are listed below.   Labs Reviewed   CBC WITH AUTO DIFFERENTIAL - Abnormal; Notable for the following:        Result Value    RBC 3.54 (*)     Hemoglobin 11.4 (*)     Hematocrit 33.7 (*)     Monocytes 9 (*)     Eosinophils % 8 (*)     Absolute Eos # 0.60 (*)     All other components within normal limits   COMPREHENSIVE METABOLIC PANEL - Abnormal; Notable for the following:     Glucose 111 (*)     BUN 25 (*)     Sodium 145 (*)     Chloride 108 (*)     Alkaline Phosphatase 32 (*)     Total Bilirubin 0.21 (*)     Total Protein 6.1 (*)     All other components within normal limits   BRAIN NATRIURETIC PEPTIDE - Abnormal; Notable for the following:     Pro- (*)     All other components within normal limits   UA W/REFLEX CULTURE - Abnormal; Notable for the following:     Leukocyte Esterase, Urine SMALL (*)     All other components within normal limits   MICROSCOPIC URINALYSIS - Abnormal; Notable for the following:     Bacteria, UA FEW (*)     All other components within normal limits   URINE CULTURE CLEAN CATCH   TROPONIN   TROPONIN   APTT   PROTIME-INR     EMERGENCY DEPARTMENT COURSE:     Vitals:    Vitals:    01/21/18 2100 01/21/18 2115 01/21/18 2130 01/21/18 2200   BP: (!) 202/80 (!) 211/72 (!) 189/75 (!) 188/81   Pulse: 68 67 67 65   Resp: 17 16 16 16   Temp:       TempSrc:       SpO2:  98% 97% 97%   Weight:         The patient was given the following medications while in the emergency department:  Orders Placed This Encounter   Medications    iopamidol (ISOVUE-370) 76 % injection 100 mL    0.9 % sodium chloride bolus    sodium chloride flush 0.9 % injection 10 mL    furosemide (LASIX) injection 40 mg    HYDROcodone-acetaminophen (NORCO) 5-325 MG per tablet 1 tablet     CONSULTS:  IP CONSULT TO INTERNAL No

## 2021-12-03 NOTE — PROGRESS NOTES
Conjunctiva/sclera: Conjunctivae normal.      Pupils: Pupils are equal, round, and reactive to light. Neck:      Thyroid: No thyromegaly. Vascular: No JVD. Trachea: No tracheal deviation. Cardiovascular:      Rate and Rhythm: Normal rate. Heart sounds: Murmur (Systolic murmur in aortic area.) heard. No gallop. Pulmonary:      Effort: Pulmonary effort is normal. No respiratory distress. Breath sounds: Normal breath sounds. No stridor. No wheezing or rales. Chest:      Chest wall: No tenderness. Abdominal:      General: Bowel sounds are normal. There is no distension. Palpations: Abdomen is soft. Tenderness: There is no abdominal tenderness. There is no guarding or rebound. Musculoskeletal:         General: Normal range of motion. Cervical back: Normal range of motion and neck supple. Neurological:      Mental Status: She is alert and oriented to person, place, and time. Assessment / Plan:   1. RLS (restless legs syndrome)  Improving     2. Type 2 diabetes mellitus with other circulatory complication, without long-term current use of insulin (HCC)  Stable     3. Hypertension, unspecified type  Controlled     4. Major depressive disorder, recurrent, moderate (HCC)  Controlled       · Return in about 3 months (around 3/3/2022). · Reviewed prior labs and health maintenance. · Discussed use, benefit, and side effects of prescribed medications. Barriers to medication compliance addressed. All patient questions answered. Pt voiced understanding. MD GENA [facility]  12/7/2021, 7:07 PM    Please note that this chart was generated using voice recognition Dragon dictation software. Although every effort was made to ensure the accuracy of this automated transcription, some errors in transcription may have occurred.         Visit Information    Have you changed or started any medications since your last visit including any over-the-counter medicines, vitamins, or herbal medicines? no   Are you having any side effects from any of your medications? -  no  Have you stopped taking any of your medications? Is so, why? -  no    Have you seen any other physician or provider since your last visit? Yes - Records Requested  Have you had any other diagnostic tests since your last visit? No  Have you been seen in the emergency room and/or had an admission to a hospital since we last saw you? No  Have you had your routine dental cleaning in the past 6 months? no    Have you activated your [employer] account? If not, what are your barriers?  Yes     Patient Care Team:  Mary Carmen Barney MD as PCP - General (Internal Medicine)  Mary Carmen Barney MD [facility] PCP [facility]  Jojo Kirby MD as Consulting Physician (Gastroenterology)    Medical History Review  Past Medical, Family, and Social History reviewed and does not contribute to the patient presenting condition    Health Maintenance   Topic Date Due    Diabetic foot exam  Never done    Diabetic retinal exam  Never done    DTaP/Tdap/Td vaccine (1 - Tdap) Never done    Shingles Vaccine (1 of 2) Never done    Flu vaccine (1) 09/01/2021    COVID-19 Vaccine (3 - Booster for Gonzalez Peter series) 10/08/2021    Annual Wellness Visit (AWV)  12/04/2021    A1C test (Diabetic or Prediabetic)  02/22/2022    Lipid screen  03/12/2022    Potassium monitoring  07/22/2022    Creatinine monitoring  07/22/2022    Breast cancer screen  05/06/2023    Colon cancer screen colonoscopy  10/07/2026    DEXA (modify frequency per FRAX score)  Completed    Pneumococcal 65+ years Vaccine  Completed    Hepatitis C screen  Completed    Hepatitis A vaccine  Aged Out    Hib vaccine  Aged Out    Meningococcal (ACWY) vaccine  Aged Out

## 2021-12-07 ASSESSMENT — ENCOUNTER SYMPTOMS
COUGH: 0
SHORTNESS OF BREATH: 0
ABDOMINAL PAIN: 0
BACK PAIN: 0
CHEST TIGHTNESS: 0
BLOOD IN STOOL: 0
ABDOMINAL DISTENTION: 0
CONSTIPATION: 0
APNEA: 0
EYE PAIN: 0
EYE REDNESS: 0
EYE ITCHING: 0
CHOKING: 0
COLOR CHANGE: 0
DIARRHEA: 0
EYE DISCHARGE: 0

## 2021-12-22 ENCOUNTER — HOSPITAL ENCOUNTER (EMERGENCY)
Age: 71
Discharge: HOME | End: 2021-12-22
Attending: STUDENT IN AN ORGANIZED HEALTH CARE EDUCATION/TRAINING PROGRAM
Payer: MEDICARE

## 2021-12-22 ENCOUNTER — APPOINTMENT (OUTPATIENT)
Dept: CT IMAGING | Age: 71
End: 2021-12-22
Payer: MEDICARE

## 2021-12-22 ENCOUNTER — APPOINTMENT (OUTPATIENT)
Dept: GENERAL RADIOLOGY | Age: 71
End: 2021-12-22
Payer: MEDICARE

## 2021-12-22 VITALS
SYSTOLIC BLOOD PRESSURE: 168 MMHG | RESPIRATION RATE: 14 BRPM | OXYGEN SATURATION: 98 % | DIASTOLIC BLOOD PRESSURE: 69 MMHG | HEART RATE: 78 BPM | TEMPERATURE: 97.4 F

## 2021-12-22 DIAGNOSIS — R42 DIZZINESS: ICD-10-CM
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
## 2021-12-22 DIAGNOSIS — S01.01XA LACERATION OF SCALP, INITIAL ENCOUNTER: ICD-10-CM

## 2021-12-22 LAB
ABSOLUTE EOS #: 0.9 K/UL (ref 0–0.4)
ABSOLUTE IMMATURE GRANULOCYTE: ABNORMAL K/UL (ref 0–0.3)
ABSOLUTE LYMPH #: 1.7 K/UL (ref 1–4.8)
ABSOLUTE MONO #: 0.8 K/UL (ref 0.1–1.3)
ALBUMIN SERPL-MCNC: 4.6 G/DL (ref 3.5–5.2)
ALBUMIN/GLOBULIN RATIO: ABNORMAL (ref 1–2.5)
ALP BLD-CCNC: 44 U/L (ref 35–104)
ALT SERPL-CCNC: 12 U/L (ref 5–33)
ANION GAP SERPL CALCULATED.3IONS-SCNC: 13 MMOL/L (ref 9–17)
AST SERPL-CCNC: 16 U/L
BASOPHILS # BLD: 1 % (ref 0–2)
BASOPHILS ABSOLUTE: 0.1 K/UL (ref 0–0.2)
BILIRUB SERPL-MCNC: 0.23 MG/DL (ref 0.3–1.2)
BILIRUBIN URINE: NEGATIVE
BUN BLDV-MCNC: 34 MG/DL (ref 8–23)
BUN/CREAT BLD: ABNORMAL (ref 9–20)
CALCIUM SERPL-MCNC: 9.3 MG/DL (ref 8.6–10.4)
CHLORIDE BLD-SCNC: 108 MMOL/L (ref 98–107)
CO2: 22 MMOL/L (ref 20–31)
COLOR: YELLOW
COMMENT UA: NORMAL
CREAT SERPL-MCNC: 0.95 MG/DL (ref 0.5–0.9)
DIFFERENTIAL TYPE: ABNORMAL
EOSINOPHILS RELATIVE PERCENT: 8 % (ref 0–4)
GFR AFRICAN AMERICAN: >60 ML/MIN
GFR NON-AFRICAN AMERICAN: 58 ML/MIN
GFR SERPL CREATININE-BSD FRML MDRD: ABNORMAL ML/MIN/{1.73_M2}
GFR SERPL CREATININE-BSD FRML MDRD: ABNORMAL ML/MIN/{1.73_M2}
GLUCOSE BLD-MCNC: 90 MG/DL (ref 70–99)
GLUCOSE URINE: NEGATIVE
HCT VFR BLD CALC: 36.5 % (ref 36–46)
HEMOGLOBIN: 12 G/DL (ref 12–16)
IMMATURE GRANULOCYTES: ABNORMAL %
KETONES, URINE: NEGATIVE
LEUKOCYTE ESTERASE, URINE: NEGATIVE
LYMPHOCYTES # BLD: 15 % (ref 24–44)
MCH RBC QN AUTO: 31.9 PG (ref 26–34)
MCHC RBC AUTO-ENTMCNC: 32.7 G/DL (ref 31–37)
MCV RBC AUTO: 97.3 FL (ref 80–100)
MONOCYTES # BLD: 7 % (ref 1–7)
NITRITE, URINE: NEGATIVE
NRBC AUTOMATED: ABNORMAL PER 100 WBC
PDW BLD-RTO: 13.2 % (ref 11.5–14.9)
PH UA: 5 (ref 5–8)
PLATELET # BLD: 324 K/UL (ref 150–450)
PLATELET ESTIMATE: ABNORMAL
PMV BLD AUTO: 7 FL (ref 6–12)
POTASSIUM SERPL-SCNC: 3.9 MMOL/L (ref 3.7–5.3)
PROTEIN UA: NEGATIVE
RBC # BLD: 3.75 M/UL (ref 4–5.2)
RBC # BLD: ABNORMAL 10*6/UL
SEG NEUTROPHILS: 69 % (ref 36–66)
SEGMENTED NEUTROPHILS ABSOLUTE COUNT: 7.6 K/UL (ref 1.3–9.1)
SODIUM BLD-SCNC: 143 MMOL/L (ref 135–144)
SPECIFIC GRAVITY UA: 1.01 (ref 1–1.03)
TOTAL PROTEIN: 7.6 G/DL (ref 6.4–8.3)
TROPONIN INTERP: ABNORMAL
TROPONIN INTERP: ABNORMAL
TROPONIN T: ABNORMAL NG/ML
TROPONIN T: ABNORMAL NG/ML
TROPONIN, HIGH SENSITIVITY: 19 NG/L (ref 0–14)
TROPONIN, HIGH SENSITIVITY: 21 NG/L (ref 0–14)
TURBIDITY: CLEAR
URINE HGB: NEGATIVE
UROBILINOGEN, URINE: NORMAL
WBC # BLD: 11.2 K/UL (ref 3.5–11)
WBC # BLD: ABNORMAL 10*3/UL

## 2021-12-22 PROCEDURE — 70450 CT HEAD/BRAIN W/O DYE: CPT

## 2021-12-22 PROCEDURE — 72125 CT NECK SPINE W/O DYE: CPT

## 2021-12-22 PROCEDURE — 6370000000 HC RX 637 (ALT 250 FOR IP): Performed by: PHYSICIAN ASSISTANT

## 2021-12-22 PROCEDURE — 99285 EMERGENCY DEPT VISIT HI MDM: CPT

## 2021-12-22 PROCEDURE — 72128 CT CHEST SPINE W/O DYE: CPT

## 2021-12-22 PROCEDURE — 85025 COMPLETE CBC W/AUTO DIFF WBC: CPT

## 2021-12-22 PROCEDURE — 81003 URINALYSIS AUTO W/O SCOPE: CPT

## 2021-12-22 PROCEDURE — 80053 COMPREHEN METABOLIC PANEL: CPT

## 2021-12-22 PROCEDURE — 36415 COLL VENOUS BLD VENIPUNCTURE: CPT

## 2021-12-22 PROCEDURE — 71045 X-RAY EXAM CHEST 1 VIEW: CPT

## 2021-12-22 PROCEDURE — 93005 ELECTROCARDIOGRAM TRACING: CPT | Performed by: PHYSICIAN ASSISTANT

## 2021-12-22 PROCEDURE — 84484 ASSAY OF TROPONIN QUANT: CPT

## 2021-12-22 RX ORDER — ACETAMINOPHEN 500 MG
1000 TABLET ORAL ONCE
Status: COMPLETED | OUTPATIENT
Start: 2021-12-22 | End: 2021-12-22

## 2021-12-22 RX ADMIN — ACETAMINOPHEN 1000 MG: 500 TABLET, FILM COATED ORAL at 16:17

## 2021-12-22 ASSESSMENT — PAIN SCALES - GENERAL
PAINLEVEL_OUTOF10: 6
PAINLEVEL_OUTOF10: 6

## 2021-12-22 ASSESSMENT — PAIN DESCRIPTION - FREQUENCY: FREQUENCY: CONTINUOUS

## 2021-12-22 ASSESSMENT — VISUAL ACUITY: OU: 1

## 2021-12-22 ASSESSMENT — PAIN DESCRIPTION - LOCATION: LOCATION: HEAD

## 2021-12-22 ASSESSMENT — PAIN DESCRIPTION - PAIN TYPE: TYPE: ACUTE PAIN

## 2021-12-22 ASSESSMENT — PAIN DESCRIPTION - DESCRIPTORS: DESCRIPTORS: DISCOMFORT

## 2021-12-22 NOTE — ED PROVIDER NOTES
[address]  eMERGENCY dEPARTMENT eNCOUnter      Pt Name: Shailesh Singletary  MRN: [id number]  [place] 1950  Date of evaluation: 12/22/21      CHIEF COMPLAINT:   Chief Complaint   Patient presents with   Shantelle Francisco    Shailesh Singletary is a 80 y.o. female who presents with with  for evaluation of fall, head injury. Pt states PTA she became dizzy in the bathroom and fell hitting the left side of her head on the bathtub. There was no loc. Pt reports laceration to left side of scalp. Reports chronic neck pain, slightly worse from baseline. Admits to upper back pain and cough. Denies current dizziness, visual changes, chest pain, sob, abd pain, nausea, emesis, numbness, weakness. Pt takes ASA.  states the patient has been off balance and suffering from dizziness for 2 years. Does follow with dr. Иван Laura. No other complaints. REVIEW OF SYSTEMS     Fall   Dizziness  Head injury  Neck pain  Back pain   Cough       Negative in 10 essential Systems except as mentioned above and in the HPI.       PAST MEDICAL HISTORY   PMH:  has a past medical history of Allergic rhinitis, cause unspecified, Back pain, Bowel obstruction (HCC), C. difficile diarrhea, CAD (coronary artery disease), Cardiac murmur, Cellulitis, Cellulitis, Cerebral artery occlusion with cerebral infarction ([address] COVID-19, Diverticulosis of colon (without mention of hemorrhage), GERD (gastroesophageal reflux disease), GERD (gastroesophageal reflux disease), History of blood transfusion, History of CHF (congestive heart failure), History of MI (myocardial infarction), History of ovarian cyst, History of peritonitis, HTN (hypertension), Hx of blood clots, Hyperlipidemia, Intestinal or peritoneal adhesions with obstruction (postoperative) (postinfection) ([address] Kidney infection, Lateral epicondylitis  of elbow, MDRO (multiple drug resistant organisms) resistance, Muscle strain, Other abnormal glucose, PONV (postoperative nausea and vomiting), Pre-diabetes, Restless legs syndrome (RLS), Snores, 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[address] TIA (transient ischemic attack), Uses walker, Vitamin D deficiency, Wears glasses, and Wellness examination. none otherwise stated from nurses notes  Surgical History:  has a past surgical history that includes Ovary removal (1970); colectomy (0691); Appendectomy (1968); Ovary removal (1971); Hysterectomy (1973); Bunionectomy (Left); sinus surgery (2004); Colonoscopy; other surgical history (08/14/2014); cyst removal (Right); Wrist fracture surgery (Left, 03/05/2019); Upper gastrointestinal endoscopy (N/A, 05/31/2019); Upper gastrointestinal endoscopy (N/A, 08/05/2019); Upper gastrointestinal endoscopy (N/A, 08/23/2019); Abdomen surgery (1976); lumbar fusion (N/A, 02/10/2020); Cardiac catheterization; Cardiac catheterization (2005); [place] tooth extraction; lumbar fusion (N/A, 06/17/2020); back surgery; cervical fusion (05/21/2021); and cervical fusion (N/A, 5/21/2021). none otherwise stated from nurses notes  Social History:  reports that she quit smoking about 4 years ago. Her smoking use included cigarettes. She started smoking about 26 years ago. She has a 10.00 pack-year smoking history. She has never used smokeless tobacco. She reports that she does not drink alcohol and does not use drugs. , lives at home with others  Family History: none  Psychiatric History: none    Allergies:is allergic to mobic [meloxicam], bactrim [sulfamethoxazole-trimethoprim], codeine, and seasonal.    PHYSICAL EXAM     INITIAL VITALS: BP (!) 168/69   Pulse 78   Temp 97.4 °F (36.3 °C) (Temporal)   Resp 14   SpO2 98%   Physical Exam  Vitals and nursing note reviewed. Constitutional:       General: She is awake. Appearance: Normal appearance. She is well-developed, well-groomed and overweight. HENT:      Head: Normocephalic. Laceration present.  No raccoon eyes, Mike's sign, abrasion, contusion, right periorbital erythema or left periorbital erythema. Nose: Nose normal.   Eyes:      General: Lids are normal. Vision grossly intact. Gaze aligned appropriately. Extraocular Movements: Extraocular movements intact. Conjunctiva/sclera: Conjunctivae normal.      Pupils: Pupils are equal, round, and reactive to light. Cardiovascular:      Rate and Rhythm: Normal rate and regular rhythm. Pulses: Normal pulses. Dorsalis pedis pulses are 2+ on the right side and 2+ on the left side. Posterior tibial pulses are 2+ on the right side and 2+ on the left side. Heart sounds: Normal heart sounds, S1 normal and S2 normal. No murmur heard. No friction rub. No gallop. Pulmonary:      Effort: Pulmonary effort is normal. No respiratory distress. Breath sounds: Normal breath sounds and air entry. No stridor. No decreased breath sounds, wheezing, rhonchi or rales. Chest:      Chest wall: No tenderness. Abdominal:      General: Abdomen is flat. There is no distension. Palpations: Abdomen is soft. Tenderness: There is no abdominal tenderness. There is no guarding or rebound. Musculoskeletal:      Cervical back: Normal range of motion. Tenderness and bony tenderness present. No swelling, edema, deformity, erythema, signs of trauma, lacerations, rigidity, spasms, torticollis or crepitus. Spinous process tenderness and muscular tenderness present. No pain with movement. Normal range of motion. Thoracic back: Tenderness and bony tenderness present. No swelling, edema, deformity, signs of trauma, lacerations or spasms. Normal range of motion. No scoliosis. Lumbar back: Normal.   Lymphadenopathy:      Cervical: No cervical adenopathy. Skin:     General: Skin is warm. Capillary Refill: Capillary refill takes less than 2 seconds. Findings: Laceration present. Neurological:      General: No focal deficit present.       Mental Status: She is alert and oriented to person, place, and time. Mental status is at baseline. Cranial Nerves: Cranial nerves are intact. No cranial nerve deficit, dysarthria or facial asymmetry. Sensory: Sensation is intact. No sensory deficit. Motor: Motor function is intact. No weakness, tremor, atrophy, abnormal muscle tone, seizure activity or pronator drift. Coordination: Coordination is intact. Romberg sign negative. Coordination normal. Finger-Nose-Finger Test and Heel to [facility] Test normal.      Gait: Gait is intact. Gait normal.      Comments: Pt ambulated with walker. Steady. Psychiatric:         Behavior: Behavior is cooperative. [place] Coma Scale*  Patient characteristics Points   Eyes open   Spontaneously 4   To speech 3   To pain 2   Never 1   Best verbal response   Oriented 5   Confused 4   Inappropriate words 3   Incomprehensible sounds 2   Silent 1   Best motor response   Obeys commands 6   Localizes pain 5   Flexion withdrawal 4   Decerebrate flexion 3   Decerebrate extension 2   No response 1   Total 15         EMERGENCY DEPARTMENT COURSE:     Orders Placed This Encounter   Medications    acetaminophen (TYLENOL) tablet 1,000 mg       Dizziness, fall. Hit head on bathtub. No loc or emesis. Pt ambulates with walker at home. Denies cp, sob, abd pain, emesis, numbness, weakness. Imaging of head, neck and thoracic spine are unremarkable. EKG shows no MI. Lab work unremarkable. No UTI. Superficial laceration. No bleeding. Discussed placing 1 staple vs no intervention. Pt would like to avoid staples. Pt ambulated with walker and steady. Pt states she would like to go home if possible. Low concern for PE, dissection, aneurysm, ICH, CVA, ACS. Discussed with dr. Abram Gonzalez who evaluated patient. She is ok for dc home. Recommend follow up with dr. Katherine Mayer. Strict return instructions discussed with patient and . They are agreeable. Discussed results and plan with the pt. They expressed appropriate understanding. Pt given close follow up, supportive care instructions and strict return instructions at the bedside. Differential diagnosis includes but is not limited to subarachnoid hemorrhage, subdural hemorrhage, skull fracture, concussion, contusion    The care is provided during an unprecedented national emergency due to the novel coronavirus, COVID-19. FINAL IMPRESSION:     1. Fall, initial encounter    2. Laceration of scalp, initial encounter    3.  Dizziness          DISPOSITION:  DISPOSITION       PATIENT REFERRED TO:  MD Chantel Vera 68  [facility] [address] [facility]  [facility] [address]  [phone]          DISCHARGE MEDICATIONS:  Discharge Medication List as of 12/22/2021  6:05 PM          (Please note that portions of this note were completed with a voice recognition program.  Efforts were made to edit the dictations but occasionally words are mis-transcribed.)       Ovi Sauer PA-C  12/22/21 Tanvir Araujo PA-C  12/22/21 [id number]

## 2021-12-23 LAB
EKG ATRIAL RATE: 77 BPM
EKG P AXIS: 47 DEGREES
EKG P-R INTERVAL: 158 MS
EKG Q-T INTERVAL: 384 MS
EKG QRS DURATION: 80 MS
EKG QTC CALCULATION (BAZETT): 434 MS
EKG R AXIS: 25 DEGREES
EKG T AXIS: 30 DEGREES
EKG VENTRICULAR RATE: 77 BPM

## 2021-12-23 PROCEDURE — 93010 ELECTROCARDIOGRAM REPORT: CPT | Performed by: INTERNAL MEDICINE

## 2021-12-23 NOTE — ED PROVIDER NOTES
EMERGENCY DEPARTMENT ENCOUNTER   ATTENDING ATTESTATION     Pt Name: Tiffany Albarran  MRN: [id number]  [place] 1950  Date of evaluation: 12/23/21   Tiffany Albarran is a 80 y.o. female with CC: Fall    MDM:   67-year-old female presenting for evaluation after a fall. Reportedly having some unsteadiness with gait for the past several months to years. Walks with a walker at baseline. No focal deficits on exam.  Small scalp laceration. Traumatic work-up unremarkable. Discussed admission for observation versus discharge home patient like to be discharged. CRITICAL CARE:       EKG: All EKG's are interpreted by the Emergency Department Physician who either signs or Co-signs this chart in the absence of a cardiologist.      RADIOLOGY:All plain film, CT, MRI, and formal ultrasound images (except ED bedside ultrasound) are read by the radiologist, see reports below, unless otherwise noted in MDM or here. CT THORACIC SPINE WO CONTRAST   Final Result   No acute abnormality identified. CT CERVICAL SPINE WO CONTRAST   Final Result   Multilevel postsurgical changes and degenerate change. No acute fracture   traumatic malalignment      RECOMMENDATIONS:   Unavailable         CT HEAD WO CONTRAST   Final Result   Left parietal region scalp hematoma. No evidence of acute intracranial   hemorrhage or fracture. Mild atrophy and chronic small vessel ischemic   changes noted. XR CHEST PORTABLE   Final Result   No acute findings. LABS: All lab results were reviewed by myself, and all abnormals are listed below.   Labs Reviewed   CBC WITH AUTO DIFFERENTIAL - Abnormal; Notable for the following components:       Result Value    WBC 11.2 (*)     RBC 3.75 (*)     Seg Neutrophils 69 (*)     Lymphocytes 15 (*)     Eosinophils % 8 (*)     Absolute Eos # 0.90 (*)     All other components within normal limits   COMPREHENSIVE METABOLIC PANEL W/ REFLEX TO MG FOR LOW K - Abnormal; Notable for the following components:    BUN 34 (*)     CREATININE 0.95 (*)     Chloride 108 (*)     Total Bilirubin 0.23 (*)     GFR Non- 58 (*)     All other components within normal limits   TROPONIN - Abnormal; Notable for the following components:    Troponin, High Sensitivity 21 (*)     All other components within normal limits   TROPONIN - Abnormal; Notable for the following components:    Troponin, High Sensitivity 19 (*)     All other components within normal limits   URINE RT REFLEX TO CULTURE     CONSULTS:  None  FINAL IMPRESSION      1. Fall, initial encounter    2. Laceration of scalp, initial encounter    3. Dizziness            PASTMEDICAL HISTORY     Past Medical History:   Diagnosis Date    Allergic rhinitis, cause unspecified     Back pain     lumbar    Bowel obstruction (HCC)     history of due to scar tissue, resolved non-surgically    C. difficile diarrhea     CAD (coronary artery disease)     no stent needed per pt.  Dr. Grecia Lott did cath at  2005    Cardiac murmur     Cellulitis     left leg    Cellulitis 2017 August    leg left leg/bug bite    Cerebral artery occlusion with cerebral infarction [facility])     TIA 2014    COVID-19     ONE YR AGO IN 4/25/2020 fever and cough    Diverticulosis of colon (without mention of hemorrhage)     GERD (gastroesophageal reflux disease)     GERD (gastroesophageal reflux disease)     on rx    History of blood transfusion     approx 2020        History of CHF (congestive heart failure)     History of MI (myocardial infarction) 2005    thought due to a blood clot    History of ovarian cyst 1970    had oopherectomy holly    History of peritonitis 1968    due to ruptured appendix age 13    HTN (hypertension)     Hx of blood clots     right leg    Hyperlipidemia     Intestinal or peritoneal adhesions with obstruction (postoperative) (postinfection) ([facility]     Kidney infection     renal failure/sepsis/spider bite    Lateral epicondylitis  of elbow     MDRO (multiple drug resistant organisms) resistance     c diff    Muscle strain     right posterior shoulder    Other abnormal glucose     PONV (postoperative nausea and vomiting)     dry heaves    Pre-diabetes     Restless legs syndrome (RLS)     Snores     no cpap    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)     TIA (transient ischemic attack) 2014    Uses walker     Vitamin D deficiency     Wears glasses     Wellness examination     last seen 2 weeks ago     SURGICAL HISTORY       Past Surgical History:   Procedure Laterality Date    ABDOMEN SURGERY  1976    benign tumor removed near remaining ovary, 1.5 pounds    APPENDECTOMY  1968    appendix ruptured, developed peritonitis    BACK SURGERY      BUNIONECTOMY Left     along with calcium deposits removed   [address]  2005    negative    CERVICAL FUSION  05/21/2021    POSTERIOR C3-6 LAMINECTOMY, PARTIAL C7 LAMINECTOMY, FUSION C3-C6, SILVERCORD    CERVICAL FUSION N/A 5/21/2021    POSTERIOR C3-6 LAMINECTOMY, PARTIAL C7 LAMINECTOMY, FUSION C3-C6, SILVERCORD performed by Hai Epstein DO at [address]    12 INCHES REMOVED D/T OBSTRUCTION    COLONOSCOPY      CYST REMOVAL Right     right facial    HYSTERECTOMY  1973    taken as a result of recurring cysts    LUMBAR FUSION N/A 02/10/2020    LUMBAR L4-5 POSTERIOR  DECOMPRESSION INSTRUMENTATION FUSION WCEMENT AUGMENTATION/ performed by Bernadette Mejia MD at [place] [id number] N/A 06/17/2020    L5-S1 PLIF L4-L5 REVISION performed by Bernadette Mejia MD at [address],[facility] [address]  08/14/2014    [address]    UNILATERAL due to cyst    OVARY REMOVAL  1971    partial, due to cyst   [facility] SINUS SURGERY  2004    UPPER GASTROINTESTINAL ENDOSCOPY N/A 05/31/2019    EGD ESOPHAGOGASTRODUODENOSCOPY performed by Jaclyn Lubin MD at [facility]. N/A 08/05/2019    EGD BIOPSY performed by Vic Sarabia MD at [address] [facility] N/A 08/23/2019    EGD BIOPSY performed by Arik Moody MD at [address] [facility] 03/05/2019    WRIST OPEN REDUCTION INTERNAL FIXATION performed by Sudha Mcmillan MD at [facility] Medication List as of 12/22/2021  6:05 PM      CONTINUE these medications which have NOT CHANGED    Details   lisinopril (PRINIVIL;ZESTRIL) 30 MG tablet Take 1 tablet by mouth daily, Disp-90 tablet, R-0Normal      fenofibrate micronized (LOFIBRA) 134 MG capsule Take 1 capsule by mouth every morning (before breakfast), Disp-90 capsule, R-3Normal      citalopram (CELEXA) 20 MG tablet Take 1 tablet by mouth daily, Disp-90 tablet, R-3Normal      pramipexole (MIRAPEX) 0.5 MG tablet Take 1 tablet by mouth nightly, Disp-90 tablet, R-3Normal      amLODIPine (NORVASC) 10 MG tablet Take 1 tablet by mouth daily, Disp-90 tablet, R-1Normal      nitroGLYCERIN (NITROSTAT) 0.4 MG SL tablet Place 1 tablet under the tongue every 5 minutes as needed for Chest pain, Disp-75 tablet, R-3Normal      furosemide (LASIX) 40 MG tablet Take 1 tablet by mouth 2 times daily, Disp-180 tablet, R-3Normal      carvedilol (COREG) 12.5 MG tablet Take 1 tablet by mouth 2 times daily, Disp-180 tablet, R-3Normal      SITagliptin (JANUVIA) 100 MG tablet Take 0.5 tablets by mouth daily, Disp-90 tablet, R-3Normal      atorvastatin (LIPITOR) 80 MG tablet Take 0.5 tablets by mouth nightly, Disp-90 tablet, R-3Normal      docusate sodium (COLACE) 100 MG capsule Take 1 capsule by mouth 2 times daily as needed for Constipation, Disp-60 capsule, R-1Print      aspirin 81 MG chewable tablet Take 81 mg by mouth daily Indications: pt reports intstructed to hold x 10 days prior to surgeryHistorical Med      diphenhydrAMINE HCl (BENADRYL ALLERGY PO) Take 1 capsule by mouth daily Takes q HSHistorical Med      cyanocobalamin ([employer] VITAMIN B12) 1000 MCG tablet Take 1 tablet by mouth daily, Disp-30 tablet, R-3Normal      ferrous sulfate (IRON 325) 325 (65 Fe) MG tablet Take 1 tablet by mouth 2 times daily, Disp-90 tablet, R-2Normal      VITAMIN D, ERGOCALCIFEROL, PO Take by mouthHistorical Med      Lancets MISC DAILY Starting Thu 10/10/2019, Disp-100 each, R-3, Normal      blood glucose monitor strips Test 2 times a day & as needed for symptoms of irregular blood glucose., Disp-100 strip, R-5, Normal      Calcium Carbonate-Vitamin D (CALCIUM 500/D) 500-125 MG-UNIT TABS Take 1 tablet by mouth 2 times daily            ALLERGIES     is allergic to mobic [meloxicam], bactrim [sulfamethoxazole-trimethoprim], codeine, and seasonal.  FAMILY HISTORY     She indicated that her mother is . She indicated that her father is . She indicated that the status of her sister is unknown. She indicated that her brother is . She indicated that her maternal grandmother is . SOCIAL HISTORY       Social History     Tobacco Use    Smoking status: Former Smoker     Packs/day: 0.50     Years: 20.00     Pack years: 10.00     Types: Cigarettes     Start date: 1995     Quit date: 2017     Years since quittin.5    Smokeless tobacco: Never Used   Vaping Use    Vaping Use: Never used   Substance Use Topics    Alcohol use: No     Alcohol/week: 0.0 standard drinks    Drug use: No       I personally evaluated and examined the patient in conjunction with the APC and agree with the assessment, treatment plan, and disposition of the patient as recorded by the APC. Alan Dawson MD  The care is provided during an unprecedented national emergency due to the novel coronavirus, COVID 19.   Attending Emergency Physician          Alan Dawson MD  [phone]

## 2022-01-03 ENCOUNTER — TELEPHONE (OUTPATIENT)
Dept: INTERNAL MEDICINE CLINIC | Age: 72
End: 2022-01-03

## 2022-01-03 DIAGNOSIS — F32.A DEPRESSION, UNSPECIFIED DEPRESSION TYPE: ICD-10-CM

## 2022-01-03 RX ORDER — CITALOPRAM 20 MG/1
20 TABLET ORAL DAILY
Qty: 90 TABLET | Refills: 3 | Status: ON HOLD | OUTPATIENT
Start: 2022-01-03 | End: 2022-04-07 | Stop reason: SDUPTHER

## 2022-01-03 NOTE — TELEPHONE ENCOUNTER
Patient calling to request Dr. Noman Orozco send an antibiotic. Informed patient that Dr. Noman Orozco is not in the office. Advised patient that with her symptoms of cough, runny nose & plugged ears x 1 week, she should report to an  or the [address] for evaluation and treatment. Patient stated it is hard for her to do a VV and is also hard to find transportation as her  is also sick and usually takes her to appointments. Please advise if an antibiotic can be sent to pharmacy.     Patient is aware there is no guarantee without evaluation & would like to send a message to Yifan as she has seen patient in the past.

## 2022-01-03 NOTE — TELEPHONE ENCOUNTER
.Medication: Citalopram   Last visit: 12/03/2021  Next visit: 3/3/2022  Last refill: 9/28/2021  Pharmacy: [facility]

## 2022-01-05 ENCOUNTER — TELEPHONE (OUTPATIENT)
Dept: INTERNAL MEDICINE CLINIC | Age: 72
End: 2022-01-05

## 2022-01-05 DIAGNOSIS — R26.81 UNSTEADY GAIT: Primary | ICD-10-CM

## 2022-01-05 DIAGNOSIS — G89.29 OTHER CHRONIC PAIN: ICD-10-CM

## 2022-01-05 NOTE — TELEPHONE ENCOUNTER
Patient is requesting in home physical therapy with [facility] for pain all over and unsteady balance.

## 2022-01-06 DIAGNOSIS — G25.81 RLS (RESTLESS LEGS SYNDROME): ICD-10-CM

## 2022-01-06 RX ORDER — PRAMIPEXOLE DIHYDROCHLORIDE 0.5 MG/1
0.5 TABLET ORAL NIGHTLY
Qty: 90 TABLET | Refills: 3 | Status: ON HOLD | OUTPATIENT
Start: 2022-01-06 | End: 2022-04-07 | Stop reason: SDUPTHER

## 2022-01-06 NOTE — TELEPHONE ENCOUNTER
Medication: Pramipexole / Mireapex  Last visit: 12/03/2021  Next visit: 3/3/2022  Last refill: 9/14/2021  Pharmacy: [address] [place] [address]

## 2022-01-10 ENCOUNTER — TELEPHONE (OUTPATIENT)
Dept: INTERNAL MEDICINE CLINIC | Age: 72
End: 2022-01-10

## 2022-01-10 NOTE — TELEPHONE ENCOUNTER
Patient completed OT evaluation today, they are trying to have patient admitted to [facility] due to high risk fall.

## 2022-01-13 DIAGNOSIS — E78.5 HYPERLIPIDEMIA, UNSPECIFIED HYPERLIPIDEMIA TYPE: ICD-10-CM

## 2022-01-13 RX ORDER — FENOFIBRATE 134 MG/1
134 CAPSULE ORAL
Qty: 90 CAPSULE | Refills: 3 | Status: ON HOLD | OUTPATIENT
Start: 2022-01-13 | End: 2022-04-07 | Stop reason: SDUPTHER

## 2022-01-13 RX ORDER — FUROSEMIDE 40 MG/1
40 TABLET ORAL 2 TIMES DAILY
Qty: 180 TABLET | Refills: 3 | Status: SHIPPED | OUTPATIENT
Start: 2022-01-13 | End: 2022-02-16

## 2022-01-17 ENCOUNTER — HOSPITAL ENCOUNTER (OUTPATIENT)
Age: 72
Discharge: HOME | End: 2022-01-19
Payer: MEDICARE

## 2022-01-17 ENCOUNTER — OFFICE VISIT (OUTPATIENT)
Dept: NEUROSURGERY | Age: 72
End: 2022-01-17
Payer: MEDICARE

## 2022-01-17 ENCOUNTER — HOSPITAL ENCOUNTER (OUTPATIENT)
Dept: GENERAL RADIOLOGY | Age: 72
Discharge: HOME | End: 2022-01-19
Payer: MEDICARE

## 2022-01-17 ENCOUNTER — TELEPHONE (OUTPATIENT)
Dept: INTERNAL MEDICINE CLINIC | Age: 72
End: 2022-01-17

## 2022-01-17 VITALS
TEMPERATURE: 97.6 F | DIASTOLIC BLOOD PRESSURE: 83 MMHG | SYSTOLIC BLOOD PRESSURE: 133 MMHG | HEART RATE: 74 BPM | RESPIRATION RATE: 16 BRPM

## 2022-01-17 DIAGNOSIS — G99.2 STENOSIS OF CERVICAL SPINE WITH MYELOPATHY (HCC): Primary | ICD-10-CM

## 2022-01-17 DIAGNOSIS — M48.02 STENOSIS OF CERVICAL SPINE WITH MYELOPATHY (HCC): ICD-10-CM

## 2022-01-17 DIAGNOSIS — R26.81 GAIT INSTABILITY: ICD-10-CM

## 2022-01-17 DIAGNOSIS — M48.02 STENOSIS OF CERVICAL SPINE WITH MYELOPATHY (HCC): Primary | ICD-10-CM

## 2022-01-17 DIAGNOSIS — Z98.1 S/P CERVICAL SPINAL FUSION: ICD-10-CM

## 2022-01-17 DIAGNOSIS — F40.240 CLAUSTROPHOBIA: Primary | ICD-10-CM

## 2022-01-17 DIAGNOSIS — G99.2 STENOSIS OF CERVICAL SPINE WITH MYELOPATHY (HCC): ICD-10-CM

## 2022-01-17 DIAGNOSIS — G57.11 MERALGIA PARESTHETICA OF RIGHT SIDE: ICD-10-CM

## 2022-01-17 PROCEDURE — 99214 OFFICE O/P EST MOD 30 MIN: CPT | Performed by: NURSE PRACTITIONER

## 2022-01-17 PROCEDURE — 72040 X-RAY EXAM NECK SPINE 2-3 VW: CPT

## 2022-01-17 RX ORDER — ALPRAZOLAM 0.5 MG/1
0.5 TABLET ORAL 3 TIMES DAILY PRN
Qty: 2 TABLET | Refills: 0 | Status: SHIPPED | OUTPATIENT
Start: 2022-01-17 | End: 2022-01-18

## 2022-01-17 NOTE — TELEPHONE ENCOUNTER
Patient called and reported that she is having an MRI of her Cervical Spine next Wednesday 1/26/22 and she is requesting medication to help keep her calm.    She said that in the past she was given Xanax to help with this    Please advise

## 2022-01-17 NOTE — PROGRESS NOTES
[address]  [facility] [address] [facility] [address]  Dept: [phone]    Patient:  Annita Ramirez  YOB: 1950  Date: 8/31/21    The patient is a 80 y.o. female who presents today for consult of the following problems:     Chief Complaint   Patient presents with    Follow-up    Neck Pain         HPI:     Annita Ramirez is a 80 y.o. female who presents for postop visit following posterior C3-C6 laminectomy fusion. Patient did initially have some improvement to gait stability, ambulation as well as improvement paresthesias. Patient and her  state that she has had some difficulty over the last 1 to 2 months with persistent difficulty with dexterity, difficulty holding onto objects. Has plateaued through physical therapy measures. Has had several falls since her last office visit, was evaluated in the emergency department approximately 3 weeks ago as she did hit her head. No acute findings on emergency department evaluation, apart from subcutaneous hematoma to left scalp. Patient has been referred for occupational therapy in the past, has just been able to establish home occupational therapy in the last week or 2 they have started working on hand dexterity, strength. Has been and patient are concerned that her therapy has not been aggressive enough, have looked into inpatient rehab, but have not been able to obtain admission/approval through insurance. History:     Past Medical History:   Diagnosis Date    Allergic rhinitis, cause unspecified     Back pain     lumbar    Bowel obstruction (HCC)     history of due to scar tissue, resolved non-surgically    C. difficile diarrhea     CAD (coronary artery disease)     no stent needed per pt.  Dr. Anisha Rodriguez did cath at  2005    Cardiac murmur     Cellulitis     left leg    Cellulitis 2017 August    leg left leg/bug bite    Cerebral artery occlusion with cerebral infarction [facility])     TIA 2014    COVID-19     ONE YR AGO IN 4/25/2020 fever and cough    Diverticulosis of colon (without mention of hemorrhage)     GERD (gastroesophageal reflux disease)     GERD (gastroesophageal reflux disease)     on rx    History of blood transfusion     approx 2020        History of CHF (congestive heart failure)     History of MI (myocardial infarction) 2005    thought due to a blood clot    History of ovarian cyst 1970    had oopherectomy holly    History of peritonitis 1968    due to ruptured appendix age 13    HTN (hypertension)     Hx of blood clots     right leg    Hyperlipidemia     Intestinal or peritoneal adhesions with obstruction (postoperative) (postinfection) ([facility] [address]     Kidney infection     renal failure/sepsis/spider bite    Lateral epicondylitis  of elbow     MDRO (multiple drug resistant organisms) resistance     c diff    Muscle strain     right posterior shoulder    Other abnormal glucose     PONV (postoperative nausea and vomiting)     dry heaves    Pre-diabetes     Restless legs syndrome (RLS)     Snores     no cpap    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)     TIA (transient ischemic attack) 2014    Uses walker     Vitamin D deficiency     Wears glasses     Wellness examination     last seen 2 weeks ago     Past Surgical History:   Procedure Laterality Date    ABDOMEN SURGERY  1976    benign tumor removed near remaining ovary, 1.5 pounds    APPENDECTOMY  1968    appendix ruptured, developed peritonitis    BACK SURGERY      BUNIONECTOMY Left     along with calcium deposits removed   [address]  2005    negative    CERVICAL FUSION  05/21/2021    POSTERIOR C3-6 LAMINECTOMY, PARTIAL C7 LAMINECTOMY, FUSION C3-C6, SILVERCORD    CERVICAL FUSION N/A 5/21/2021    POSTERIOR C3-6 LAMINECTOMY, PARTIAL C7 LAMINECTOMY, FUSION C3-C6, SILVERCORD performed by Kimberly Pa DO at [facility] 81 D/T OBSTRUCTION    COLONOSCOPY      CYST REMOVAL Right     right facial    HYSTERECTOMY  1973    taken as a result of recurring cysts    LUMBAR FUSION N/A 02/10/2020    LUMBAR L4-5 POSTERIOR  DECOMPRESSION INSTRUMENTATION FUSION WCEMENT AUGMENTATION/ performed by aVnessa Blake MD at [facility] 78 N/A 06/17/2020    L5-S1 PLIF L4-L5 REVISION performed by Vanessa Blake MD at [place] [address].  08/14/2014    FESS    OVARY REMOVAL  1970    UNILATERAL due to cyst    OVARY REMOVAL  1971    partial, due to cyst   [place] SINUS SURGERY  2004    UPPER GASTROINTESTINAL ENDOSCOPY N/A 05/31/2019    EGD ESOPHAGOGASTRODUODENOSCOPY performed by Nile Nicole MD at [facility] 26 N/A 08/05/2019    EGD BIOPSY performed by Bennie Singh MD at [facility] 26 N/A 08/23/2019    EGD BIOPSY performed by Nile Nicole MD at [address] 03/05/2019    WRIST OPEN REDUCTION INTERNAL FIXATION performed by Lindsay Thompson MD at [facility] History   Problem Relation Age of Onset    Stroke Mother     Diabetes Mother     Heart Disease Mother     High Blood Pressure Mother     Heart Disease Father     Heart Disease Brother     High Blood Pressure Brother     Heart Disease Maternal Grandmother     High Blood Pressure Sister      Current Outpatient Medications on File Prior to Visit   Medication Sig Dispense Refill    fenofibrate micronized (LOFIBRA) 134 MG capsule Take 1 capsule by mouth every morning (before breakfast) 90 capsule 3    furosemide (LASIX) 40 MG tablet Take 1 tablet by mouth 2 times daily 180 tablet 3    pramipexole (MIRAPEX) 0.5 MG tablet Take 1 tablet by mouth nightly 90 tablet 3    citalopram (CELEXA) 20 MG tablet Take 1 tablet by mouth daily 90 tablet 3    lisinopril (PRINIVIL;ZESTRIL) 30 MG tablet Take 1 tablet by mouth daily 90 tablet 0    amLODIPine (NORVASC) 10 MG tablet Take 1 tablet by mouth daily 90 tablet 1    nitroGLYCERIN (NITROSTAT) 0.4 MG SL tablet Place 1 tablet under the tongue every 5 minutes as needed for Chest pain 75 tablet 3    carvedilol (COREG) 12.5 MG tablet Take 1 tablet by mouth 2 times daily 180 tablet 3    SITagliptin (JANUVIA) 100 MG tablet Take 0.5 tablets by mouth daily 90 tablet 3    atorvastatin (LIPITOR) 80 MG tablet Take 0.5 tablets by mouth nightly 90 tablet 3    aspirin 81 MG chewable tablet Take 81 mg by mouth daily Indications: pt reports intstructed to hold x 10 days prior to surgery      diphenhydrAMINE HCl (BENADRYL ALLERGY PO) Take 1 capsule by mouth daily Takes q HS      cyanocobalamin ([employer] VITAMIN B12) 1000 MCG tablet Take 1 tablet by mouth daily 30 tablet 3    ferrous sulfate (IRON 325) 325 (65 Fe) MG tablet Take 1 tablet by mouth 2 times daily 90 tablet 2    VITAMIN D, ERGOCALCIFEROL, PO Take by mouth      Lancets MISC 1 each by Does not apply route daily 100 each 3    blood glucose monitor strips Test 2 times a day & as needed for symptoms of irregular blood glucose. 100 strip 5    Calcium Carbonate-Vitamin D (CALCIUM 500/D) 500-125 MG-UNIT TABS Take 1 tablet by mouth 2 times daily       docusate sodium (COLACE) 100 MG capsule Take 1 capsule by mouth 2 times daily as needed for Constipation (Patient not taking: Reported on 2021) 60 capsule 1     No current facility-administered medications on file prior to visit. Social History     Tobacco Use    Smoking status: Former Smoker     Packs/day: 0.50     Years: 20.00     Pack years: 10.00     Types: Cigarettes     Start date: 1995     Quit date: 2017     Years since quittin.5    Smokeless tobacco: Never Used   Vaping Use    Vaping Use: Never used   Substance Use Topics    Alcohol use: No     Alcohol/week: 0.0 standard drinks    Drug use:  No Allergies   Allergen Reactions    Mobic [Meloxicam]     Bactrim [Sulfamethoxazole-Trimethoprim] Other (See Comments)     sepsis    Codeine Itching    Seasonal        Review of Systems  Constitutional: Negative for activity change and appetite change. HENT: Negative for ear pain and facial swelling. Eyes: Negative for discharge and itching. Respiratory: Negative for choking and chest tightness. Cardiovascular: Negative for chest pain and leg swelling. Gastrointestinal: Negative for nausea and abdominal pain. Endocrine: Negative for cold intolerance and heat intolerance. Genitourinary: Negative for frequency and flank pain. Musculoskeletal: Negative for myalgias and joint swelling. Skin: Negative for rash and wound. Allergic/Immunologic: Negative for environmental allergies and food allergies. Hematological: Negative for adenopathy. Does not bruise/bleed easily. Psychiatric/Behavioral: Negative for self-injury. The patient is not nervous/anxious. Physical Exam:      /83   Pulse 74   Temp 97.6 °F (36.4 °C)   Resp 16   Estimated body mass index is 32.45 kg/m² as calculated from the following:    Height as of 12/3/21: 5' 3\" (1.6 m). Weight as of 12/3/21: 183 lb 3.2 oz (83.1 kg). General:  Ijeoma Meyer is a 80y.o. year old female who appears her stated age. HEENT: Normocephalic atraumatic. Neck supple. Chest: regular rate; pulses equal  Abdomen: Soft nontender nondistended.   Ext: DP and PT pulses 2+, good cap refill  Neuro    Mentation  Appropriate affect  Registration intact  Orientation intact  3 item recall intact  Judgement intact to situation    Cranial Nerves:   Pupils equal and reactive to light  Extraocular motion intact  Face and shrug symmetric  Tongue midline  No dysarthria  v1-3 sensation symmetric, masseter tone symmetric  Hearing symmetric    Sensation: Decreased to right anterolateral thigh    Motor  L deltoid 5/5; R deltoid 5/5  L biceps 5/5; R biceps 5/5  L triceps 5/5; R triceps 5/5  L wrist extension 4+/5-secondary to previous wrist injury and subsequent surgery; R wrist extension 5/5  L intrinsics 5/5; R intrinsics 5/5     L iliopsoas 5/5 , R iliopsoas 5/5  L quadriceps 5/5; R quadriceps 5/5  L Dorsiflexion 5/5; R dorsiflexion 5/5  L Plantarflexion 5/5; R plantarflexion 5/5  L EHL 5/5; R EHL 5/5    Reflexes  L Brachioradialis 2+/4; R brachioradialis 2+/4  L Biceps 2+/4; R Biceps 2+/4  L Triceps 2+/4; R Triceps 2+/4  L Patellar 2+/4: R Patellar 2+/4  L Achilles 2+/4; R Achilles 2+/4    hoffmans L: neg  hoffmans R: neg  Clonus L: neg  Clonus R: neg  Babinski L: neg  Babinski R: neg    Studies Review:     X-ray cervical spine 1/17/2022 (images reviewed): FINDINGS:   Forward head positioning may relate to exaggerated thoracic kyphosis that is   not visualized.  Laminectomy with posterior fusion hardware consisting of   paraspinal rods and pedicle screws spanning C3 through C6.  The hardware is   intact and in unchanged alignment without surrounding lucency.  There is   grade 1 degenerative anterolisthesis at C3 on C4 which is unchanged from   prior.  Moderate disc height loss with degenerative endplate changes and   uncovertebral spurring at C5 through C7, unchanged.  Prevertebral soft   tissues are normal thickness. CT cervical spine 12/22/2021 (images reviewed): FINDINGS:   BONES/ALIGNMENT: There is no acute fracture or traumatic malalignment.       DEGENERATIVE CHANGES: Extensive postsurgical changes status post multilevel   posterior fusion C3 through C6.  Hardware is intact.  Evidence multilevel   decompressive laminectomy identified from C3 through C6 noted.  Multilevel   disc space disease fine, findings most advanced C5-6 and less so C6-C7. Mild-to-moderate disc space disease C3-C4.  Mild disc space disease at C4-C5.       SOFT TISSUES: There is no prevertebral soft tissue swelling.      Physical therapy notes reviewed  ED records reviewed    Assessment and Plan:      1. 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 Gait instability    3. S/P cervical spinal fusion    4. Meralgia paresthetica of right side          Plan: Recommend obtaining updated cervical MRI to evaluate for any new cord compression to account for regression in symptoms. Would also recommend evaluation by physical medicine rehab specialist to help guide therapy, and evaluate for appropriateness of possible inpatient rehabilitation. Patient to return in 6 weeks for follow-up with Dr. Mague Quezada for review of repeat imaging    Followup: Return in about 6 weeks (around 2/28/2022), or if symptoms worsen or fail to improve. Prescriptions Ordered:  No orders of the defined types were placed in this encounter. Orders Placed:  Orders Placed This Encounter   Procedures    MRI CERVICAL SPINE WO CONTRAST     Standing Status:   Future     Standing Expiration Date:   1/17/2023     Order Specific Question:   Reason for exam:     Answer:   eval for residual cord compression   Felicitas Hawk MD, Physical Medicine and Rehabilitation, [facility]     Referral Priority:   Routine     Referral Type:   Eval and Treat     Referral Reason:   Specialty Services Required     Referred to Provider:   Regina Hernandez MD     Requested Specialty:   Physical Medicine and Rehab     Number of Visits Requested:   1        Electronically signed by MAIK Abernathy CNP on 1/17/2022 at 2:53 PM    Please note that this chart was generated using voice recognition Dragon dictation software. Although every effort was made to ensure the accuracy of this automated transcription, some errors in transcription may have occurred.

## 2022-01-18 ENCOUNTER — TELEPHONE (OUTPATIENT)
Dept: INTERNAL MEDICINE CLINIC | Age: 72
End: 2022-01-18

## 2022-01-19 ENCOUNTER — TELEPHONE (OUTPATIENT)
Dept: INTERNAL MEDICINE CLINIC | Age: 72
End: 2022-01-19

## 2022-01-19 DIAGNOSIS — R29.6 RECURRENT FALLS: Primary | ICD-10-CM

## 2022-01-19 NOTE — TELEPHONE ENCOUNTER
Patient called back and states her Rani Duran needs a copy of the referral also.   Please call [phone] to see where to send the referral.

## 2022-01-19 NOTE — TELEPHONE ENCOUNTER
----- Message from Scot Sotomayor MD sent at 2/1/2021  7:06 PM CST -----  Please call Shania.  All her stool studies are fine.  How is her diarrhea doing?   Referral pending.

## 2022-01-19 NOTE — TELEPHONE ENCOUNTER
[facility]  Would you be willing to refer her there so she can get in? Please advise.     If approved, please fax to facility [facility]

## 2022-01-21 DIAGNOSIS — I10 HYPERTENSION, UNSPECIFIED TYPE: ICD-10-CM

## 2022-01-21 RX ORDER — LISINOPRIL 30 MG/1
30 TABLET ORAL DAILY
Qty: 90 TABLET | Refills: 1 | Status: ON HOLD | OUTPATIENT
Start: 2022-01-21 | End: 2022-04-07 | Stop reason: HOSPADM

## 2022-01-26 ENCOUNTER — HOSPITAL ENCOUNTER (OUTPATIENT)
Dept: MRI IMAGING | Age: 72
Discharge: HOME | End: 2022-01-28
Payer: MEDICARE

## 2022-01-26 DIAGNOSIS — R26.81 GAIT INSTABILITY: ICD-10-CM

## 2022-01-26 DIAGNOSIS — M48.02 STENOSIS OF CERVICAL SPINE WITH MYELOPATHY (HCC): ICD-10-CM

## 2022-01-26 DIAGNOSIS — Z98.1 S/P CERVICAL SPINAL FUSION: ICD-10-CM

## 2022-01-26 DIAGNOSIS — G99.2 STENOSIS OF CERVICAL SPINE WITH MYELOPATHY (HCC): ICD-10-CM

## 2022-01-26 PROCEDURE — 72141 MRI NECK SPINE W/O DYE: CPT

## 2022-01-31 ENCOUNTER — TELEPHONE (OUTPATIENT)
Dept: INTERNAL MEDICINE CLINIC | Age: 72
End: 2022-01-31

## 2022-01-31 NOTE — TELEPHONE ENCOUNTER
[address] [facility] Physical therapist called to inform that patient had a fall and hit her head on the dry wall.     Scheduled appointment with Renaldo Lee

## 2022-02-01 ENCOUNTER — OFFICE VISIT (OUTPATIENT)
Dept: INTERNAL MEDICINE CLINIC | Age: 72
End: 2022-02-01
Payer: MEDICARE

## 2022-02-01 VITALS
DIASTOLIC BLOOD PRESSURE: 80 MMHG | HEIGHT: 63 IN | BODY MASS INDEX: 32.43 KG/M2 | SYSTOLIC BLOOD PRESSURE: 137 MMHG | TEMPERATURE: 98.4 F | WEIGHT: 183 LBS | OXYGEN SATURATION: 97 % | HEART RATE: 67 BPM

## 2022-02-01 DIAGNOSIS — I63.9 CEREBROVASCULAR ACCIDENT (CVA), UNSPECIFIED MECHANISM (HCC): ICD-10-CM

## 2022-02-01 DIAGNOSIS — E11.59 TYPE 2 DIABETES MELLITUS WITH OTHER CIRCULATORY COMPLICATION, WITHOUT LONG-TERM CURRENT USE OF INSULIN (HCC): ICD-10-CM

## 2022-02-01 DIAGNOSIS — T14.8XXA MUSCLE STRAIN: ICD-10-CM

## 2022-02-01 DIAGNOSIS — L03.115 CELLULITIS OF RIGHT LOWER EXTREMITY: ICD-10-CM

## 2022-02-01 DIAGNOSIS — H65.01 NON-RECURRENT ACUTE SEROUS OTITIS MEDIA OF RIGHT EAR: ICD-10-CM

## 2022-02-01 DIAGNOSIS — R29.6 RECURRENT FALLS: Primary | ICD-10-CM

## 2022-02-01 LAB — HBA1C MFR BLD: 5.1 %

## 2022-02-01 PROCEDURE — 99214 OFFICE O/P EST MOD 30 MIN: CPT | Performed by: NURSE PRACTITIONER

## 2022-02-01 PROCEDURE — 83036 HEMOGLOBIN GLYCOSYLATED A1C: CPT | Performed by: NURSE PRACTITIONER

## 2022-02-01 RX ORDER — DOXYCYCLINE HYCLATE 100 MG
100 TABLET ORAL 2 TIMES DAILY
Qty: 14 TABLET | Refills: 0 | Status: SHIPPED | OUTPATIENT
Start: 2022-02-01 | End: 2022-02-08

## 2022-02-01 RX ORDER — ATORVASTATIN CALCIUM 80 MG/1
40 TABLET, FILM COATED ORAL NIGHTLY
Qty: 90 TABLET | Refills: 3 | Status: ON HOLD | OUTPATIENT
Start: 2022-02-01 | End: 2022-04-07 | Stop reason: SDUPTHER

## 2022-02-01 ASSESSMENT — ENCOUNTER SYMPTOMS
NAUSEA: 0
WHEEZING: 0
ABDOMINAL PAIN: 0
COLOR CHANGE: 0
COUGH: 0
CHEST TIGHTNESS: 0
DIARRHEA: 0
CONSTIPATION: 0
VOMITING: 0
RHINORRHEA: 0
TROUBLE SWALLOWING: 0
SHORTNESS OF BREATH: 0
SORE THROAT: 0

## 2022-02-01 NOTE — PROGRESS NOTES
[address] [address]  Dept: [phone]  Dept Fax: [phone]    OfficeProgress/Follow Up Note  Date of patient's visit: 2/1/2022  Patient's Name:  Allie Coombs YOB: 1950            Patient Care Team:  Guy Zee MD as PCP - General (Internal Medicine)  Guy Zee MD as PCP - [facility]  Scarlet Wallace MD as Consulting Physician (Gastroenterology)    REASON FOR VISIT:  Routine outpatient follow up    HISTORY OF PRESENT ILLNESS:        History was obtained from the patient. Allie Coombs is a 71 y.o. female here today for Head Injury (had a fall ), Arm Pain (just a bruise on left am ), and Other (wants to discuss her mri)    Patient presents to our office following a fall. H/o recurrent falls. Patient reports she simply lost balance and hit head on dry wall and landed on R side. Pain is located in occipital region. She denies LOC, nausea, vomiting or visual disturbance. Patient endorses sinus congestion x few days and b/l otalgia. Patient is also a type 2 diabetic, who does not check her blood sugars regularly and is currently taking Januvia 50 mg daily.       Patient Active Problem List   Diagnosis    Other specified disorders of rotator cuff syndrome of shoulder and allied disorders    Diverticulosis of large intestine    Intestinal or peritoneal adhesions with obstruction (postoperative) (postinfection) ([address]    Restless legs syndrome (RLS)    GERD (gastroesophageal reflux disease)    Essential hypertension    Mixed hyperlipidemia    Other abnormal glucose    Atherosclerosis    Allergic rhinitis    Vitamin D deficiency    Depression    Degenerative joint disease (DJD) of hip    Peripheral edema    Injury of foot, left    Facial droop    CVA (cerebral vascular accident) ([address]    Bradycardia    Ataxia    Aphasia    TIA (transient ischemic attack)    Need for prophylactic vaccination and inoculation against cholera alone    Osteopenia    Lumbago    Meralgia paresthetica of right side    Lumbar degenerative disc disease    Spondylosis of lumbar region without myelopathy or radiculopathy    Blood poisoning    Cellulitis of left lower extremity    Encounter for medication monitoring    Deep vein thrombosis (DVT) of right lower extremity (HCC)    Chronic deep vein thrombosis (DVT) of proximal vein of both lower extremities (HCC)    Chronic diastolic heart failure (HCC)    Neurogenic claudication due to lumbar spinal stenosis    Sacroiliitis (HCC)    Stage 3 chronic kidney disease (HCC)    Type 2 diabetes mellitus with circulatory disorder, without long-term current use of insulin (HCC)    Chronic deep vein thrombosis (DVT) of popliteal vein of right lower extremity (HCC)    Closed fracture of left wrist    B12 deficiency    Iron deficiency anemia secondary to inadequate dietary iron intake    Closed head injury    Scalp laceration    Abnormal finding on imaging    Other irritable bowel syndrome    Frequent falls    Double vision    Muscle soreness    Peptic ulcer    Melena    PUD (peptic ulcer disease)    Absolute anemia    Acute blood loss anemia    Acute renal failure (HCC)    Clostridium difficile infection    Acquired spondylolisthesis    Spinal stenosis of lumbar region with neurogenic claudication    Acute deep vein thrombosis (DVT) of proximal vein of left lower extremity (HCC)    Leg swelling    Back pain    Neurological disorder    Closed unstable burst fracture of fifth lumbar vertebra with routine healing    Slow transit constipation    Major depressive disorder, recurrent, moderate (HCC)    Acute deep vein thrombosis (DVT) of femoral vein of left lower extremity (HCC)    Stenosis of cervical spine with myelopathy [facility])       Health Maintenance Due   Topic Date Due    Diabetic foot exam  Never done    Diabetic retinal exam  Never done   Petr DTaP/Tdap/Td vaccine (1 - Tdap) Never done    Shingles Vaccine (1 of 2) Never done    Flu vaccine (1) 09/01/2021    COVID-19 Vaccine (3 - Booster for Gonzalez Peter series) 09/08/2021    Annual Wellness Visit (AWV)  12/04/2021       MEDICATIONS:      Current Outpatient Medications   Medication Sig Dispense Refill    atorvastatin (LIPITOR) 80 MG tablet Take 0.5 tablets by mouth nightly 90 tablet 3    doxycycline hyclate (VIBRA-TABS) 100 MG tablet Take 1 tablet by mouth 2 times daily for 7 days 14 tablet 0    lisinopril (PRINIVIL;ZESTRIL) 30 MG tablet Take 1 tablet by mouth daily 90 tablet 1    fenofibrate micronized (LOFIBRA) 134 MG capsule Take 1 capsule by mouth every morning (before breakfast) 90 capsule 3    furosemide (LASIX) 40 MG tablet Take 1 tablet by mouth 2 times daily 180 tablet 3    pramipexole (MIRAPEX) 0.5 MG tablet Take 1 tablet by mouth nightly 90 tablet 3    citalopram (CELEXA) 20 MG tablet Take 1 tablet by mouth daily 90 tablet 3    amLODIPine (NORVASC) 10 MG tablet Take 1 tablet by mouth daily 90 tablet 1    nitroGLYCERIN (NITROSTAT) 0.4 MG SL tablet Place 1 tablet under the tongue every 5 minutes as needed for Chest pain 75 tablet 3    carvedilol (COREG) 12.5 MG tablet Take 1 tablet by mouth 2 times daily 180 tablet 3    SITagliptin (JANUVIA) 100 MG tablet Take 0.5 tablets by mouth daily 90 tablet 3    docusate sodium (COLACE) 100 MG capsule Take 1 capsule by mouth 2 times daily as needed for Constipation 60 capsule 1    aspirin 81 MG chewable tablet Take 81 mg by mouth daily Indications: pt reports intstructed to hold x 10 days prior to surgery      diphenhydrAMINE HCl (BENADRYL ALLERGY PO) Take 1 capsule by mouth daily Takes q HS      cyanocobalamin ([employer] VITAMIN B12) 1000 MCG tablet Take 1 tablet by mouth daily 30 tablet 3    ferrous sulfate (IRON 325) 325 (65 Fe) MG tablet Take 1 tablet by mouth 2 times daily 90 tablet 2    VITAMIN D, ERGOCALCIFEROL, PO Take by mouth      Lancets MISC 1 each by Does not apply route daily 100 each 3    blood glucose monitor strips Test 2 times a day & as needed for symptoms of irregular blood glucose. 100 strip 5    Calcium Carbonate-Vitamin D (CALCIUM 500/D) 500-125 MG-UNIT TABS Take 1 tablet by mouth 2 times daily        No current facility-administered medications for this visit. ALLERGIES:      Allergies   Allergen Reactions    Mobic [Meloxicam]     Bactrim [Sulfamethoxazole-Trimethoprim] Other (See Comments)     sepsis    Codeine Itching    Seasonal          SOCIAL HISTORY    Reviewed and no change from previous record. Reema Chopra  reports that she quit smoking about 4 years ago. Her smoking use included cigarettes. She started smoking about 26 years ago. She has a 10.00 pack-year smoking history. She has never used smokeless tobacco.    FAMILY HISTORY:    Reviewed and No change from previous visit    REVIEW OF SYSTEMS:    Review of Systems   Constitutional: Negative for chills, diaphoresis, fatigue, fever and unexpected weight change. HENT: Positive for congestion and ear pain. Negative for ear discharge, postnasal drip, rhinorrhea, sneezing, sore throat and trouble swallowing. Eyes: Negative for visual disturbance. Respiratory: Negative for cough, chest tightness, shortness of breath and wheezing. Cardiovascular: Positive for leg swelling (BLE, nonpitting). Negative for chest pain. Gastrointestinal: Negative for abdominal pain, constipation, diarrhea, nausea and vomiting. Endocrine: Negative for polydipsia, polyphagia and polyuria. Genitourinary: Negative for difficulty urinating, dysuria, flank pain, frequency and urgency. Musculoskeletal: Positive for arthralgias (occiptal tenderness) and myalgias (R upper thigh tenderness). Skin: Positive for wound (RLE, picking at skin; scabbed lesions). Negative for color change and rash. Neurological: Positive for weakness (LUE and LLE, ambulates with walker).  Negative for dizziness, tremors, syncope, numbness and headaches. Psychiatric/Behavioral: Negative for confusion and hallucinations. PHYSICAL EXAM:      Vitals:    22 0956   BP: 137/80   Pulse: 67   Temp: 98.4 °F (36.9 °C)   SpO2: 97%   Weight: 183 lb (83 kg)   Height: 5' 3\" (1.6 m)     BP Readings from Last 3 Encounters:   22 137/80   22 133/83   21 (!) 168/69      Wt Readings from Last 3 Encounters:   22 183 lb (83 kg)   21 183 lb 3.2 oz (83.1 kg)   21 179 lb 6.4 oz (81.4 kg)       Physical Exam  Vitals reviewed. Constitutional:       Appearance: Normal appearance. HENT:      Head: Normocephalic. Right Ear: Tympanic membrane is erythematous. Left Ear: Tympanic membrane and external ear normal.      Ears:      Comments: Slight cerumen noted in b/l canal, however both TM's clearly visualized  Eyes:      General: No visual field deficit. Extraocular Movements: Extraocular movements intact. Pupils: Pupils are equal, round, and reactive to light. Cardiovascular:      Rate and Rhythm: Normal rate and regular rhythm. Pulses: Normal pulses. Heart sounds: Murmur heard. Systolic murmur is present. Pulmonary:      Effort: Pulmonary effort is normal.      Breath sounds: Normal breath sounds. Abdominal:      General: Bowel sounds are normal.      Palpations: Abdomen is soft. Musculoskeletal:         General: No swelling, tenderness or deformity. Normal range of motion. Right lower le+ Edema (non pitting) present. Left lower le+ Edema (nonpitting) present. Skin:     General: Skin is warm and dry. Findings: Erythema and wound (picked lesions/scabs noted RLE from frequent scratching; slight erythema appreciated) present. Neurological:      General: No focal deficit present. Mental Status: She is alert and oriented to person, place, and time. Cranial Nerves: No facial asymmetry.       Gait: Gait abnormal (ambulates with walker). Psychiatric:         Mood and Affect: Mood normal.         Behavior: Behavior normal.         Thought Content: Thought content normal.         Judgment: Judgment normal.          LABORATORY FINDINGS:    CBC:  Lab Results   Component Value Date    WBC 11.2 12/22/2021    HGB 12.0 12/22/2021     12/22/2021       BMP:    Lab Results   Component Value Date     12/22/2021    K 3.9 12/22/2021     12/22/2021    CO2 22 12/22/2021    BUN 34 12/22/2021    CREATININE 0.95 12/22/2021    GLUCOSE 90 12/22/2021       HEMOGLOBIN A1C:   Lab Results   Component Value Date    LABA1C 5.1 02/01/2022       FASTING LIPID PANEL:  Lab Results   Component Value Date    CHOL 103 05/20/2019    HDL 74 03/12/2021    TRIG 66 05/20/2019       ASSESSMENT AND PLAN:      1. Recurrent falls  - neurologically intact, denies symptoms   - instructed to go to ED if she develops HA, blurred vision, N/V, worsening weakness, etc   -External Referral To Physical Therapy    2. Cerebrovascular accident (CVA), unspecified mechanism ([facility]  - atorvastatin (LIPITOR) 80 MG tablet; Take 0.5 tablets by mouth nightly  Dispense: 90 tablet; Refill: 3  - External Referral To Physical Therapy    3. Type 2 diabetes mellitus with other circulatory complication, without long-term current use of insulin ([facility])  - MARYJANE Fernández, HgA1C 5.1 in office; potential contributing factor for frequent falls  - POCT glycosylated hemoglobin (Hb A1C); Future  - POCT glycosylated hemoglobin (Hb A1C)    4. Non-recurrent acute serous otitis media of right ear  - doxycycline hyclate (VIBRA-TABS) 100 MG tablet; Take 1 tablet by mouth 2 times daily for 7 days  Dispense: 14 tablet; Refill: 0    5. Cellulitis of right lower extremity  - doxycycline hyclate (VIBRA-TABS) 100 MG tablet; Take 1 tablet by mouth 2 times daily for 7 days  Dispense: 14 tablet; Refill: 0    6.  Muscle strain  - encourage warm compresses, tylenol and use of salonpas or lidocaine patches PRN for symptom relief       FOLLOW UP AND INSTRUCTIONS:   Return in about 2 weeks (around 2/15/2022). Keshav Rae received counseling on the following healthy behaviors: nutrition, exercise and medication adherence    Discussed use, benefit, and side effects of prescribed medications. Barriers to medication compliance addressed. All patient questions answered. Patient voiced understanding. Patient given educational materials - see patient instructions    Ozzie Schaumann, APRN - CNP   [facility]  2/1/2022, 1:33 PM    Please note that this chart was generated using voice recognition Dragon dictation software. Although everyeffort was made to ensure the accuracy of this automated transcription, some errors in transcription may have occurred.

## 2022-02-15 ENCOUNTER — INITIAL CONSULT (OUTPATIENT)
Dept: PHYSICAL MEDICINE AND REHAB | Age: 72
End: 2022-02-15
Payer: MEDICARE

## 2022-02-15 VITALS
BODY MASS INDEX: 32.42 KG/M2 | TEMPERATURE: 97.8 F | DIASTOLIC BLOOD PRESSURE: 62 MMHG | SYSTOLIC BLOOD PRESSURE: 139 MMHG | WEIGHT: 183 LBS | HEART RATE: 72 BPM

## 2022-02-15 DIAGNOSIS — R29.6 FALLS FREQUENTLY: ICD-10-CM

## 2022-02-15 DIAGNOSIS — M54.17 RADICULOPATHY, LUMBOSACRAL REGION: ICD-10-CM

## 2022-02-15 DIAGNOSIS — R27.0 ATAXIA: Primary | ICD-10-CM

## 2022-02-15 PROCEDURE — 99215 OFFICE O/P EST HI 40 MIN: CPT | Performed by: PHYSICAL MEDICINE & REHABILITATION

## 2022-02-15 NOTE — PROGRESS NOTES
[facility] PHYSICAL MEDICINE & REHABILITATION  [address] [place] [address]  [place] [address]  Dept: [phone]  Dept Fax: [phone]    Outpatient Followup Note    Jhoan Garcia, 71 y.o., female, presents for follow up c/o of New Patient (neck and shoulder pain on the left side; difficulty walking and balance)  . HPI:     HPI  Patient with extensive history of cervical and lumbar spine stenosis and surgical intervention who is being seen today due to impaired gait and balance with frequent falls at home. Many of her falls have caused injury including a wrist fracture and striking her head. Dr. Andi Mejia performed L4-5 fusion and vertebroplasty in February 2020 after which she had acute rehab admission at [address] [place] [address]. Dr. Andi Mejia' note from 7/2/2020 reviewed - 2 week post-op follow up for L5-S1 posterior laminectomy and fusion and L4-5 revision performed in June after patient had Chance fracture through L5. Patient reports this was the result of a fall at home after her initial lumbar fusion in February. Dr. Lea Tan performed C3-6 laminectomy and fusion May of 2021 after which she had SNF level rehabilitation. She has completed home health therapy which she reports was discontinued by her insurance carrier. Her  reports that she has had 4 falls in the past few days despite having DME at home which includes a shower chair, rollator, and rolling walker. He notes that her balance is impaired and the way she performs her ADLs often leads to imbalance and falls. She has struck her head multiple times from these falls and has had multiple ED visits as a result. She also notes an increase in her BLE edema for the past 2 weeks with some blisters developing despite being on Lasix 40 mg BID. She sees her PCP tomorrow. She has burning pain which shoots from R groin to R knee anterior and laterally on her thigh.  She rates the pain 1-4/10 at best, 8/10 at worst. Pain is worse in the evening. Standing increases her pain. She has associated numbness and tingling in the R leg and weakness in the dominant L hand and L leg. She has tried heat and ice, Tylenol for pain with mild relief. Past Medical History:   Diagnosis Date    Allergic rhinitis, cause unspecified     Back pain     lumbar    Bowel obstruction (HCC)     history of due to scar tissue, resolved non-surgically    C. difficile diarrhea     CAD (coronary artery disease)     no stent needed per pt.  Dr. Don Ramsay did cath at  2005    Cardiac murmur     Cellulitis     left leg    Cellulitis 2017 August    leg left leg/bug bite    Cerebral artery occlusion with cerebral infarction [facility])     TIA 2014    COVID-19     ONE YR AGO IN 4/25/2020 fever and cough    Diverticulosis of colon (without mention of hemorrhage)     GERD (gastroesophageal reflux disease)     GERD (gastroesophageal reflux disease)     on rx    History of blood transfusion     approx 2020        History of CHF (congestive heart failure)     History of MI (myocardial infarction) 2005    thought due to a blood clot    History of ovarian cyst 1970    had oopherectomy holly    History of peritonitis 1968    due to ruptured appendix age 13    HTN (hypertension)     Hx of blood clots     right leg    Hyperlipidemia     Intestinal or peritoneal adhesions with obstruction (postoperative) (postinfection) ([facility] [address]     Kidney infection     renal failure/sepsis/spider bite    Lateral epicondylitis  of elbow     MDRO (multiple drug resistant organisms) resistance     c diff    Muscle strain     right posterior shoulder    Other abnormal glucose     PONV (postoperative nausea and vomiting)     dry heaves    Pre-diabetes     Restless legs syndrome (RLS)     Snores     no cpap    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)     TIA (transient ischemic attack) 2014    Uses walker     Vitamin D deficiency     Wears glasses     Wellness examination     last seen 2 weeks ago      Past Surgical History:   Procedure Laterality Date    ABDOMEN SURGERY  1976    benign tumor removed near remaining ovary, 1.5 pounds    APPENDECTOMY  1968    appendix ruptured, developed peritonitis    BACK SURGERY      BUNIONECTOMY Left     along with calcium deposits removed   [address]  2005    negative    CERVICAL FUSION  05/21/2021    POSTERIOR C3-6 LAMINECTOMY, PARTIAL C7 LAMINECTOMY, FUSION C3-C6, SILVERCORD    CERVICAL FUSION N/A 5/21/2021    POSTERIOR C3-6 LAMINECTOMY, PARTIAL C7 LAMINECTOMY, FUSION C3-C6, SILVERCORD performed by Claudia Brown DO at [address] [facility] [address]    12 INCHES REMOVED D/T OBSTRUCTION    COLONOSCOPY      CYST REMOVAL Right     right facial    HYSTERECTOMY  1973    taken as a result of recurring cysts    LUMBAR FUSION N/A 02/10/2020    LUMBAR L4-5 POSTERIOR  DECOMPRESSION INSTRUMENTATION FUSION WCEMENT AUGMENTATION/ performed by Khadra Swain MD at [place] [address] N/A 06/17/2020    L5-S1 PLIF L4-L5 REVISION performed by Khadra Swain MD at [address] [facility]  08/14/2014    [address]    UNILATERAL due to cyst    OVARY REMOVAL  1971    partial, due to cyst    SINUS SURGERY  2004    UPPER GASTROINTESTINAL ENDOSCOPY N/A 05/31/2019    EGD ESOPHAGOGASTRODUODENOSCOPY performed by Deborah Jara MD at [address] [facility] 08/05/2019    EGD BIOPSY performed by General Dennis MD at [facility] N/A 08/23/2019    EGD BIOPSY performed by Deborah Jara MD at [address] [facility] 03/05/2019    WRIST OPEN REDUCTION INTERNAL FIXATION performed by Magi Leavitt MD at [facility] History   Problem Relation Age of Onset    Stroke Mother     Diabetes Mother     Heart Disease Mother     High Blood Pressure Mother  Heart Disease Father     Heart Disease Brother     High Blood Pressure Brother     Heart Disease Maternal Grandmother     High Blood Pressure Sister      Social History     Socioeconomic History    Marital status:      Spouse name: None    Number of children: None    Years of education: None    Highest education level: None   Occupational History    Occupation: retired   Tobacco Use    Smoking status: Former Smoker     Packs/day: 0.50     Years: 20.00     Pack years: 10.00     Types: Cigarettes     Start date: 1995     Quit date: 2017     Years since quittin.6    Smokeless tobacco: Never Used   Vaping Use    Vaping Use: Never used   Substance and Sexual Activity    Alcohol use: No     Alcohol/week: 0.0 standard drinks    Drug use: No    Sexual activity: None   Other Topics Concern    None   Social History Narrative    None     Social Determinants of Health     Financial Resource Strain: Low Risk     Difficulty of Paying Living Expenses: Not hard at all   Food Insecurity: No Food Insecurity    Worried About Running Out of Food in the Last Year: Never true    Raghavendra of Food in the Last Year: Never true   Transportation Needs:     Lack of Transportation (Medical): Not on file    Lack of Transportation (Non-Medical):  Not on file   Physical Activity:     Days of Exercise per Week: Not on file    Minutes of Exercise per Session: Not on file   Stress:     Feeling of Stress : Not on file   Social Connections:     Frequency of Communication with Friends and Family: Not on file    Frequency of Social Gatherings with Friends and Family: Not on file    Attends [employer] Services: Not on file    Active Member of Clubs or Organizations: Not on file    Attends Club or Organization Meetings: Not on file    Marital Status: Not on file   Intimate Partner Violence:     Fear of Current or Ex-Partner: Not on file    Emotionally Abused: Not on file    Physically Abused: Not on file    Sexually Abused: Not on file   Housing Stability:     Unable to Pay for Housing in the Last Year: Not on file    Number of Places Lived in the Last Year: Not on file    Unstable Housing in the Last Year: Not on file       Current Outpatient Medications   Medication Sig Dispense Refill    atorvastatin (LIPITOR) 80 MG tablet Take 0.5 tablets by mouth nightly 90 tablet 3    lisinopril (PRINIVIL;ZESTRIL) 30 MG tablet Take 1 tablet by mouth daily 90 tablet 1    fenofibrate micronized (LOFIBRA) 134 MG capsule Take 1 capsule by mouth every morning (before breakfast) 90 capsule 3    furosemide (LASIX) 40 MG tablet Take 1 tablet by mouth 2 times daily 180 tablet 3    pramipexole (MIRAPEX) 0.5 MG tablet Take 1 tablet by mouth nightly 90 tablet 3    citalopram (CELEXA) 20 MG tablet Take 1 tablet by mouth daily 90 tablet 3    amLODIPine (NORVASC) 10 MG tablet Take 1 tablet by mouth daily 90 tablet 1    nitroGLYCERIN (NITROSTAT) 0.4 MG SL tablet Place 1 tablet under the tongue every 5 minutes as needed for Chest pain 75 tablet 3    carvedilol (COREG) 12.5 MG tablet Take 1 tablet by mouth 2 times daily 180 tablet 3    docusate sodium (COLACE) 100 MG capsule Take 1 capsule by mouth 2 times daily as needed for Constipation 60 capsule 1    aspirin 81 MG chewable tablet Take 81 mg by mouth daily Indications: pt reports intstructed to hold x 10 days prior to surgery      diphenhydrAMINE HCl (BENADRYL ALLERGY PO) Take 1 capsule by mouth daily Takes q HS      cyanocobalamin ([employer] VITAMIN B12) 1000 MCG tablet Take 1 tablet by mouth daily 30 tablet 3    ferrous sulfate (IRON 325) 325 (65 Fe) MG tablet Take 1 tablet by mouth 2 times daily 90 tablet 2    VITAMIN D, ERGOCALCIFEROL, PO Take by mouth      Lancets MISC 1 each by Does not apply route daily 100 each 3    blood glucose monitor strips Test 2 times a day & as needed for symptoms of irregular blood glucose.  100 strip 5    Calcium Carbonate-Vitamin D (CALCIUM 500/D) 500-125 MG-UNIT TABS Take 1 tablet by mouth 2 times daily        No current facility-administered medications for this visit. Allergies   Allergen Reactions    Mobic [Meloxicam]     Bactrim [Sulfamethoxazole-Trimethoprim] Other (See Comments)     sepsis    Codeine Itching    Seasonal        Subjective:      Review of Systems  Constitutional: Negative for fever, chills and unexpected weight change. HENT: Negative for trouble swallowing. Respiratory: Negative for cough and shortness of breath. Cardiovascular: Negative for chest pain. Endocrine: Negative for polyuria. Genitourinary: Negative for dysuria, urgency, frequency, incontinence and difficulty urinating. Gastrointestinal: Negative for constipation or diarrhea. Musculoskeletal: Positive for arthralgias. Weakness of L dominant hand. Neurological: Negative for headaches. Positive for numbness tingling to R thigh. Psychiatric: Negative for depressed mood or anxiety. Objective:     Physical Exam  /62   Pulse 72   Temp 97.8 °F (36.6 °C)   Wt 183 lb (83 kg) Comment: weight is per the patient. i am unable to verify  BMI 32.42 kg/m²   Constitutional: She appears well-developed and well-nourished. In no distress. HEENT: NCAT, PERRL, EOMI. Mucous membranes pink and moist.  Pulmonary/Chest: Respirations WNL and unlabored. MSK: Functional ROM BUEs. Impaired AROM due to weakness BLEs. Impaired cervical spine ROM on extension to neutral, and lateral rotation to the L limited to 45 degrees, Lateral bending bilaterally limited to 45 degrees. Forward cervical flexion to 75 degrees. Palpable muscle tightness/trigger points B cervical paraspinal muscles and R upper trapezius muscle. Strength 4/5 key muscles RUE, 3/5 key muscles LUE including . B hip flexion 4+/5. B knee extension 4+/5. B dorsiflexion 4-/5, B plantar flexion 4+/5. Has to use BUEs to perform sit to stand. Able to stand for 1 minute.  Unable to safely ambulate today to assess gait. Neurological: She is alert and oriented to person, place, and time. DTRs 2+ and R brachioradialis, R patella, R Achilles. 3+ L brachioradialis, L patella, L Achilles. Negative Hoffmans bilaterally. Heel to shin testing delayed but accurate. RAMBO WNL. No cogwheel rigidity on exam including with distraction. SLR positive RLE, negative LLE. Skin: Skin is warm dry and intact with good turgor. Extremities: 2+ pitting edema BLEs. Psychiatric: She has a normal mood and affect. Her behavior is normal. Thought content normal.   Medical assistant note and vitals for today's encounter reviewed. Diagnostic Studies:     CT HEAD 12/22/21  FINDINGS:   BRAIN/VENTRICLES: The gyri and sulci have a normal appearance. There is mild   cortical and cerebellar volume loss with associated ex vacuo ventricular   dilation. Mild diffuse periventricular and subcortical deep white matter   hypoattenuation noted, findings compatible with chronic small vessel ischemic   disease.  The gray-white matter differentiation is otherwise preserved   throughout. Jillene Bergamo is no acute hemorrhage, mass, or mass effect.  No evidence   of acute territorial infarct.  No abnormal extraaxial fluid collections.       ORBITS:  The visualized portion of the orbits demonstrate no acute   abnormality.       SINUSES:  The mastoid air cells are normally aerated.  Bilateral maxillary   sinus mucous retention cysts are again noted along with a bone growth within   the inferior right maxillary sinus, possibly a benign exostosis.  This is   unchanged from prior.  The visualized paranasal sinuses are otherwise grossly   clear.       SOFT TISSUES/SKULL:  Moderate-sized left parietal region scalp hematoma which   measures 1.4 cm in thickness by 4.5 cm in length.  No acute fracture of the   calvarium or skull base.           Impression   Left parietal region scalp hematoma.  No evidence of acute intracranial   hemorrhage or fracture. Carine Ackerman atrophy and chronic small vessel ischemic   changes noted. CT CERVICAL SPINE 12/22/21  FINDINGS:   BONES/ALIGNMENT: There is no acute fracture or traumatic malalignment.       DEGENERATIVE CHANGES: Extensive postsurgical changes status post multilevel   posterior fusion C3 through C6.  Hardware is intact.  Evidence multilevel   decompressive laminectomy identified from C3 through C6 noted.  Multilevel   disc space disease fine, findings most advanced C5-6 and less so C6-C7. Mild-to-moderate disc space disease C3-C4.  Mild disc space disease at C4-C5.       SOFT TISSUES: There is no prevertebral soft tissue swelling.           Impression   Multilevel postsurgical changes and degenerate change.  No acute fracture   traumatic malalignment         CT THORACIC SPINE 12/22/21  FINDINGS:   BONES/ALIGNMENT: No acute fracture or traumatic malalignment is seen.       DEGENERATIVE CHANGES: Mild multilevel degenerative disc space narrowing is   noted.  No significant central canal stenosis is seen.       SOFT TISSUES: No paravertebral edema is identified.  Paraspinal muscles are   symmetric.  Visualized mediastinum is unremarkable.  Visualized lungs are   clear.           Impression   No acute abnormality identified.      MRI CERVICAL SPINE 1/26/22  FINDINGS:   BONES/ALIGNMENT: The vertebral body heights are maintained.  There is   age-appropriate bone marrow signal.  There is posterior fixation and   laminectomy from C3-C6.  There is multilevel degenerative disc disease with   loss of disc signal.  There is disc space narrowing at C5-6.  There is no   spondylolisthesis.       SPINAL CORD: The spinal cord is normal in caliber and signal.       SOFT TISSUES: The posterior paraspinal soft tissues are unremarkable.  The   prevertebral soft tissues are unremarkable.       C2-C3: There is a disc osteophyte complex with posterior laminectomy.  There   is no canal stenosis.  There is no foraminal narrowing.       C3-C4: There is a disc osteophyte complex with posterior laminectomy.  There   is no canal stenosis.  There is moderate bilateral foraminal narrowing.       C4-C5: There is a disc osteophyte complex with posterior laminectomy.  There   is no canal stenosis or left foraminal narrowing.  There is mild to moderate   right foraminal narrowing.       C5-C6: There is a disc osteophyte complex with posterior laminectomy.  There   is no canal stenosis.  There is moderate bilateral foraminal narrowing.       C6-C7: There is a disc osteophyte complex with uncovertebral and facet   hypertrophy and posterior laminectomy.  There is no canal stenosis or   significant foraminal narrowing.       C7-T1: There is no significant disc protrusion, spinal canal stenosis or   neural foraminal narrowing.           Impression   Posterior fixation and laminectomy from C3-C6 without evidence for   complication.       Multilevel degenerative change with bilateral foraminal narrowing. Assessment:       Diagnosis Orders   1. [address]   2. Falls frequently  [address]   3. Radiculopathy, lumbosacral region          Plan:       The patient will benefit from acute inpatient rehabilitation once medically stable per primary and consulting services. Anticipate she will be able to tolerate 3 hours of therapy per day or 900 minutes per week in rehabilitation. The patient requires multidisciplinary rehabilitation treatment including medical management by a PM&R physician, 24 hour rehabilitation nursing, Physical/Occupational therapy, rehabilitation social work, and nutrition services. Patient and family also require education in post-hospital precautions and home exercise routine, adaptive techniques and deficit compensation strategies, strengthening and conditioning, equipment prescription and instructions in use. Patient having untreated neurologic pain and a worsening of BLE edema which may be related to her CHF. Awaiting her PCP appointment tomorrow. She would benefit from close medical management of these conditions. Message sent to Dr. Cesar Long. For outpatient PT/OT evaluations to assess for possible inpatient acute rehab admission. Orders Placed This Encounter   Procedures   [address]     Referral Priority:   Routine     Referral Type:   Eval and Treat     Referral Reason:   Specialty Services Required     Requested Specialty:   Occupational Therapy     Number of Visits Requested:   [address]     Referral Priority:   Routine     Referral Type:   Eval and Treat     Referral Reason:   Specialty Services Required     Requested Specialty:   Physical Therapy     Number of Visits Requested:   1     No orders of the defined types were placed in this encounter. Return in about 8 weeks (around 4/12/2022). Time spent: 40 minutes    Electronically signedby Vessie Mortimer, MD on 2/15/2022 at 5:48 PM.     Please note that this chartwas generated using voice recognition Dragon dictation software. Although everyeffort was made to ensure the accuracy of this automated transcription, some errorsin transcription may have occurred.

## 2022-02-16 ENCOUNTER — OFFICE VISIT (OUTPATIENT)
Dept: INTERNAL MEDICINE CLINIC | Age: 72
End: 2022-02-16
Payer: MEDICARE

## 2022-02-16 ENCOUNTER — HOSPITAL ENCOUNTER (OUTPATIENT)
Dept: VASCULAR LAB | Age: 72
Discharge: HOME | End: 2022-02-16
Payer: MEDICARE

## 2022-02-16 ENCOUNTER — TELEPHONE (OUTPATIENT)
Dept: PHYSICAL MEDICINE AND REHAB | Age: 72
End: 2022-02-16

## 2022-02-16 ENCOUNTER — HOSPITAL ENCOUNTER (OUTPATIENT)
Age: 72
Discharge: HOME | End: 2022-02-16
Payer: MEDICARE

## 2022-02-16 VITALS
TEMPERATURE: 98.2 F | DIASTOLIC BLOOD PRESSURE: 58 MMHG | BODY MASS INDEX: 30.65 KG/M2 | HEART RATE: 71 BPM | OXYGEN SATURATION: 98 % | SYSTOLIC BLOOD PRESSURE: 130 MMHG | WEIGHT: 173 LBS | HEIGHT: 63 IN

## 2022-02-16 DIAGNOSIS — I10 ESSENTIAL HYPERTENSION: ICD-10-CM

## 2022-02-16 DIAGNOSIS — E11.59 TYPE 2 DIABETES MELLITUS WITH OTHER CIRCULATORY COMPLICATION, WITHOUT LONG-TERM CURRENT USE OF INSULIN (HCC): ICD-10-CM

## 2022-02-16 DIAGNOSIS — I50.32 CHRONIC DIASTOLIC HEART FAILURE (HCC): ICD-10-CM

## 2022-02-16 DIAGNOSIS — R22.43 LOCALIZED SWELLING OF BOTH LOWER LEGS: ICD-10-CM

## 2022-02-16 DIAGNOSIS — R29.6 MULTIPLE FALLS: ICD-10-CM

## 2022-02-16 DIAGNOSIS — L03.119 CELLULITIS OF LOWER EXTREMITY, UNSPECIFIED LATERALITY: ICD-10-CM

## 2022-02-16 DIAGNOSIS — N89.8 VAGINAL IRRITATION: Primary | ICD-10-CM

## 2022-02-16 DIAGNOSIS — R26.81 UNSTEADY GAIT: ICD-10-CM

## 2022-02-16 LAB
ANION GAP SERPL CALCULATED.3IONS-SCNC: 10 MMOL/L (ref 9–17)
BILIRUBIN, POC: 1
BLOOD URINE, POC: NEGATIVE
BUN BLDV-MCNC: 26 MG/DL (ref 8–23)
BUN/CREAT BLD: ABNORMAL (ref 9–20)
CALCIUM SERPL-MCNC: 9.4 MG/DL (ref 8.6–10.4)
CHLORIDE BLD-SCNC: 105 MMOL/L (ref 98–107)
CLARITY, POC: CLEAR
CO2: 26 MMOL/L (ref 20–31)
COLOR, POC: NORMAL
CREAT SERPL-MCNC: 0.8 MG/DL (ref 0.5–0.9)
GFR AFRICAN AMERICAN: >60 ML/MIN
GFR NON-AFRICAN AMERICAN: >60 ML/MIN
GFR SERPL CREATININE-BSD FRML MDRD: ABNORMAL ML/MIN/{1.73_M2}
GFR SERPL CREATININE-BSD FRML MDRD: ABNORMAL ML/MIN/{1.73_M2}
GLUCOSE BLD-MCNC: 103 MG/DL (ref 70–99)
GLUCOSE URINE, POC: NEGATIVE
KETONES, POC: NEGATIVE
LEUKOCYTE EST, POC: NEGATIVE
NITRITE, POC: NEGATIVE
PH, POC: 6
POTASSIUM SERPL-SCNC: 4 MMOL/L (ref 3.7–5.3)
PROTEIN, POC: NEGATIVE
SODIUM BLD-SCNC: 141 MMOL/L (ref 135–144)
SPECIFIC GRAVITY, POC: 1.03
UROBILINOGEN, POC: NEGATIVE

## 2022-02-16 PROCEDURE — 80048 BASIC METABOLIC PNL TOTAL CA: CPT

## 2022-02-16 PROCEDURE — 36415 COLL VENOUS BLD VENIPUNCTURE: CPT

## 2022-02-16 PROCEDURE — 93970 EXTREMITY STUDY: CPT

## 2022-02-16 PROCEDURE — 93971 EXTREMITY STUDY: CPT

## 2022-02-16 PROCEDURE — 81002 URINALYSIS NONAUTO W/O SCOPE: CPT | Performed by: NURSE PRACTITIONER

## 2022-02-16 PROCEDURE — 99214 OFFICE O/P EST MOD 30 MIN: CPT | Performed by: NURSE PRACTITIONER

## 2022-02-16 RX ORDER — CEPHALEXIN 500 MG/1
500 CAPSULE ORAL 3 TIMES DAILY
Qty: 21 CAPSULE | Refills: 0 | Status: SHIPPED | OUTPATIENT
Start: 2022-02-16 | End: 2022-02-23

## 2022-02-16 RX ORDER — FLUCONAZOLE 150 MG/1
150 TABLET ORAL ONCE
Qty: 1 TABLET | Refills: 0 | Status: SHIPPED | OUTPATIENT
Start: 2022-02-16 | End: 2022-02-16

## 2022-02-16 RX ORDER — BUMETANIDE 1 MG/1
1 TABLET ORAL DAILY
Qty: 30 TABLET | Refills: 11 | Status: SHIPPED | OUTPATIENT
Start: 2022-02-16 | End: 2022-03-01

## 2022-02-16 NOTE — PROGRESS NOTES
Visit Information    Have you changed or started any medications since your last visit including any over-the-counter medicines, vitamins, or herbal medicines? no   Are you having any side effects from any of your medications? -  no  Have you stopped taking any of your medications? Is so, why? -  no    Have you seen any other physician or provider since your last visit? No  Have you had any other diagnostic tests since your last visit? No  Have you been seen in the emergency room and/or had an admission to a hospital since we last saw you? No  Have you had your routine dental cleaning in the past 6 months? yes -3/4/22    Have you activated your [employer] account? If not, what are your barriers?  Yes     Patient Care Team:  Hao Arguello MD as PCP - General (Internal Medicine)  Hao Arguello MD as PCP - [facility] Provider  Yung Yates MD as Consulting Physician (Gastroenterology)    Medical History Review  Past Medical, Family, and Social History reviewed and does contribute to the patient presenting condition    Health Maintenance   Topic Date Due    Diabetic foot exam  Never done    Diabetic retinal exam  Never done    DTaP/Tdap/Td vaccine (1 - Tdap) Never done    Shingles Vaccine (1 of 2) Never done    Flu vaccine (1) 09/01/2021    COVID-19 Vaccine (3 - Booster for Gonzalez Peter series) 09/08/2021    Annual Wellness Visit (AWV)  12/04/2021    Lipid screen  03/12/2022    Depression Monitoring  04/22/2022    A1C test (Diabetic or Prediabetic)  02/01/2023    Potassium monitoring  02/16/2023    Creatinine monitoring  02/16/2023    Breast cancer screen  05/06/2023    Colorectal Cancer Screen  10/07/2026    DEXA (modify frequency per FRAX score)  Completed    Pneumococcal 65+ years Vaccine  Completed    Hepatitis C screen  Completed    Hepatitis A vaccine  Aged Out    Hib vaccine  Aged Out    Meningococcal (ACWY) vaccine  Aged Out         [address] [facility] [address] [facility] [address] [address] [address]  Dept: [phone]  Dept Fax: [phone]    OfficeProgress/Follow Up Note  Date of patient's visit: 2/22/2022  Patient's Name:  Dandre Huynh YOB: 1950            Patient Care Team:  Steffanie Benedict MD as PCP - General (Internal Medicine)  Steffanie Benedict MD as PCP - [address] [place] [address] Provider  Monico Agee MD as Consulting Physician (Gastroenterology)    REASON FOR VISIT:  Routine outpatient follow up    HISTORY OF PRESENT ILLNESS:        History was obtained from the patient. Dandre Huynh is a 71 y.o. female here today for Fall (legs are swollen) and Other (vaginal itching )    Patient presents to our office for BLE and vaginal itching. Vaginal itching started following antibiotic use for cellulitis and blisters of BLE. Patient denies symptom improvement with doxycycline.      Patient Active Problem List   Diagnosis    Other specified disorders of rotator cuff syndrome of shoulder and allied disorders    Diverticulosis of large intestine    Intestinal or peritoneal adhesions with obstruction (postoperative) (postinfection) ([address]    Restless legs syndrome (RLS)    GERD (gastroesophageal reflux disease)    Essential hypertension    Mixed hyperlipidemia    Other abnormal glucose    Atherosclerosis    Allergic rhinitis    Vitamin D deficiency    Depression    Degenerative joint disease (DJD) of hip    Peripheral edema    Injury of foot, left    Facial droop    CVA (cerebral vascular accident) ([address]    Bradycardia    Ataxia    Aphasia    TIA (transient ischemic attack)    Need for prophylactic vaccination and inoculation against cholera alone    Osteopenia    Lumbago    Meralgia paresthetica of right side    Lumbar degenerative disc disease    Spondylosis of lumbar region without myelopathy or radiculopathy    Blood poisoning    Cellulitis of left lower extremity    Encounter for medication monitoring    Deep vein thrombosis (DVT) of right lower extremity (HCC)    Chronic deep vein thrombosis (DVT) of proximal vein of both lower extremities (HCC)    Chronic diastolic heart failure (HCC)    Neurogenic claudication due to lumbar spinal stenosis    Sacroiliitis (HCC)    Stage 3 chronic kidney disease (HCC)    Type 2 diabetes mellitus with circulatory disorder, without long-term current use of insulin (HCC)    Chronic deep vein thrombosis (DVT) of popliteal vein of right lower extremity (HCC)    Closed fracture of left wrist    B12 deficiency    Iron deficiency anemia secondary to inadequate dietary iron intake    Closed head injury    Scalp laceration    Abnormal finding on imaging    Other irritable bowel syndrome    Frequent falls    Double vision    Muscle soreness    Peptic ulcer    Melena    PUD (peptic ulcer disease)    Absolute anemia    Acute blood loss anemia    Acute renal failure (HCC)    Clostridium difficile infection    Acquired spondylolisthesis    Spinal stenosis of lumbar region with neurogenic claudication    Acute deep vein thrombosis (DVT) of proximal vein of left lower extremity (HCC)    Leg swelling    Back pain    Neurological disorder    Closed unstable burst fracture of fifth lumbar vertebra with routine healing    Slow transit constipation    Major depressive disorder, recurrent, moderate (HCC)    Acute deep vein thrombosis (DVT) of femoral vein of left lower extremity (HCC)    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)    Acute deep vein thrombosis (DVT) of right femoral vein ([facility]       Health Maintenance Due   Topic Date Due    Diabetic foot exam  Never done    Diabetic retinal exam  Never done    DTaP/Tdap/Td vaccine (1 - Tdap) Never done    Shingles Vaccine (1 of 2) Never done    Flu vaccine (1) 09/01/2021    COVID-19 Vaccine (3 - Booster for Gonzalez Peter series) 09/08/2021    Annual Wellness Visit (AWV)  12/04/2021       MEDICATIONS:      Current Outpatient Medications Medication Sig Dispense Refill    apixaban (ELIQUIS) 5 MG TABS tablet Please take 2 tablets by mouth for the first week then take 1 tablet by mouth BID for acute DVT 90 tablet 3    bumetanide (BUMEX) 1 MG tablet Take 1 tablet by mouth daily 30 tablet 11    cephALEXin (KEFLEX) 500 MG capsule Take 1 capsule by mouth 3 times daily for 7 days 21 capsule 0    atorvastatin (LIPITOR) 80 MG tablet Take 0.5 tablets by mouth nightly 90 tablet 3    lisinopril (PRINIVIL;ZESTRIL) 30 MG tablet Take 1 tablet by mouth daily 90 tablet 1    fenofibrate micronized (LOFIBRA) 134 MG capsule Take 1 capsule by mouth every morning (before breakfast) 90 capsule 3    pramipexole (MIRAPEX) 0.5 MG tablet Take 1 tablet by mouth nightly 90 tablet 3    citalopram (CELEXA) 20 MG tablet Take 1 tablet by mouth daily 90 tablet 3    amLODIPine (NORVASC) 10 MG tablet Take 1 tablet by mouth daily 90 tablet 1    nitroGLYCERIN (NITROSTAT) 0.4 MG SL tablet Place 1 tablet under the tongue every 5 minutes as needed for Chest pain 75 tablet 3    carvedilol (COREG) 12.5 MG tablet Take 1 tablet by mouth 2 times daily 180 tablet 3    docusate sodium (COLACE) 100 MG capsule Take 1 capsule by mouth 2 times daily as needed for Constipation 60 capsule 1    aspirin 81 MG chewable tablet Take 81 mg by mouth daily Indications: pt reports intstructed to hold x 10 days prior to surgery      diphenhydrAMINE HCl (BENADRYL ALLERGY PO) Take 1 capsule by mouth daily Takes q HS      cyanocobalamin ([employer] VITAMIN B12) 1000 MCG tablet Take 1 tablet by mouth daily 30 tablet 3    ferrous sulfate (IRON 325) 325 (65 Fe) MG tablet Take 1 tablet by mouth 2 times daily 90 tablet 2    VITAMIN D, ERGOCALCIFEROL, PO Take by mouth      Calcium Carbonate-Vitamin D (CALCIUM 500/D) 500-125 MG-UNIT TABS Take 1 tablet by mouth 2 times daily       Lancets MISC 1 each by Does not apply route daily 100 each 3    blood glucose monitor strips Test 2 times a day & as needed for symptoms of irregular blood glucose. 100 strip 5     No current facility-administered medications for this visit. ALLERGIES:      Allergies   Allergen Reactions    Bactrim [Sulfamethoxazole-Trimethoprim] Other (See Comments)     sepsis    Seasonal          SOCIAL HISTORY    Reviewed and no change from previous record. Mario Rodriguez  reports that she quit smoking about 4 years ago. Her smoking use included cigarettes. She started smoking about 26 years ago. She has a 10.00 pack-year smoking history. She has never used smokeless tobacco.    FAMILY HISTORY:    Reviewed and No change from previous visit    REVIEW OF SYSTEMS:    Review of Systems   Constitutional: Negative for chills, diaphoresis, fatigue, fever and unexpected weight change. HENT: Negative for congestion, postnasal drip, rhinorrhea, sneezing, sore throat and trouble swallowing. Eyes: Negative for visual disturbance. Respiratory: Negative for cough, chest tightness, shortness of breath and wheezing. Cardiovascular: Positive for leg swelling. Negative for chest pain. Gastrointestinal: Negative for constipation, diarrhea, nausea and vomiting. Endocrine: Negative for polydipsia, polyphagia and polyuria. Genitourinary: Negative for difficulty urinating, dysuria, flank pain, frequency and urgency. Musculoskeletal: Positive for arthralgias and gait problem. Skin: Positive for rash. Negative for color change. Neurological: Negative for dizziness, tremors, syncope, weakness and numbness. Psychiatric/Behavioral: Negative for confusion and hallucinations.        PHYSICAL EXAM:      Vitals:    02/16/22 1027   BP: (!) 130/58   Site: Right Upper Arm   Position: Sitting   Cuff Size: Medium Adult   Pulse: 71   Temp: 98.2 °F (36.8 °C)   SpO2: 98%   Weight: 173 lb (78.5 kg)   Height: 5' 3\" (1.6 m)     BP Readings from Last 3 Encounters:   02/16/22 (!) 130/58   02/15/22 139/62   02/01/22 137/80      Wt Readings from Last 3 Encounters:   02/16/22 173 lb (78.5 kg)   02/15/22 183 lb (83 kg)   22 183 lb (83 kg)       Physical Exam  Vitals reviewed. Constitutional:       Appearance: Normal appearance. She is obese. HENT:      Head: Normocephalic. Cardiovascular:      Rate and Rhythm: Normal rate and regular rhythm. Pulses: Normal pulses. Heart sounds: Normal heart sounds. Pulmonary:      Effort: Pulmonary effort is normal.      Breath sounds: Normal breath sounds. Abdominal:      General: Bowel sounds are normal.      Palpations: Abdomen is soft. Musculoskeletal:         General: No swelling, tenderness or deformity. Normal range of motion. Right lower le+ Pitting Edema present. Left lower le+ Pitting Edema present. Skin:     General: Skin is warm and dry. Neurological:      General: No focal deficit present. Mental Status: She is alert and oriented to person, place, and time. Gait: Gait abnormal.   Psychiatric:         Mood and Affect: Mood normal.         Behavior: Behavior normal.         Thought Content: Thought content normal.         Judgment: Judgment normal.          LABORATORY FINDINGS:    CBC:  Lab Results   Component Value Date    WBC 11.2 2021    HGB 12.0 2021     2021       BMP:    Lab Results   Component Value Date     2022    K 4.0 2022     2022    CO2 26 2022    BUN 26 2022    CREATININE 0.80 2022    GLUCOSE 103 2022       HEMOGLOBIN A1C:   Lab Results   Component Value Date    LABA1C 5.1 2022       FASTING LIPID PANEL:  Lab Results   Component Value Date    CHOL 103 2019    HDL 74 2021    TRIG 66 2019       ASSESSMENT AND PLAN:      1. Vaginal irritation  - most likely secondary to recent antibiotic use, treat for yeast infection  - POCT Urinalysis no Micro  - fluconazole (DIFLUCAN) 150 MG tablet; Take 1 tablet by mouth once for 1 dose  Dispense: 1 tablet; Refill: 0    2.  Localized chart was generated using voice recognition Dragon dictation software. Although everyeffort was made to ensure the accuracy of this automated transcription, some errors in transcription may have occurred.

## 2022-02-16 NOTE — TELEPHONE ENCOUNTER
----- Message from Russell Gamez sent at 2/15/2022  5:27 PM EST -----  Regarding: Sherley Larios is referring patient to [facility]. Needs us to call them to request/schedule 1 time evals for admission to acute rehab, preferably with appointments that are as close together as possible. I'm not familiar with that process or how we go about requesting this so she (Dr. Bouchra Larios) recommend I ask you about it. Thanks!

## 2022-02-16 NOTE — TELEPHONE ENCOUNTER
I called [facility] and gave the information and someone will call patient today for appts in the same day. Called the /wife and let them know someone would be calliing them today to make this appt and they will be there for a couple of hours for both evals.

## 2022-02-22 PROBLEM — I82.411 ACUTE DEEP VEIN THROMBOSIS (DVT) OF RIGHT FEMORAL VEIN (HCC): Status: ACTIVE | Noted: 2022-02-22

## 2022-02-22 ASSESSMENT — ENCOUNTER SYMPTOMS
RHINORRHEA: 0
TROUBLE SWALLOWING: 0
DIARRHEA: 0
CHEST TIGHTNESS: 0
SORE THROAT: 0
SHORTNESS OF BREATH: 0
CONSTIPATION: 0
COLOR CHANGE: 0
VOMITING: 0
COUGH: 0
NAUSEA: 0
WHEEZING: 0

## 2022-02-23 DIAGNOSIS — I10 ESSENTIAL HYPERTENSION: ICD-10-CM

## 2022-02-23 RX ORDER — CARVEDILOL 12.5 MG/1
12.5 TABLET ORAL 2 TIMES DAILY
Qty: 180 TABLET | Refills: 3 | Status: ON HOLD | OUTPATIENT
Start: 2022-02-23 | End: 2022-04-07 | Stop reason: SDUPTHER

## 2022-02-23 NOTE — TELEPHONE ENCOUNTER
Medication: carvediolol  Last visit: 2/16/22  Next visit: 3/23/2022  Last refill: 8/16/21  [facility]

## 2022-02-25 ENCOUNTER — TELEPHONE (OUTPATIENT)
Dept: INTERNAL MEDICINE CLINIC | Age: 72
End: 2022-02-25

## 2022-02-25 NOTE — TELEPHONE ENCOUNTER
LVM informing patient to call back on Monday for an appointment. Advised her to report the the ER over the weekend if the pain is worsening or spreading.

## 2022-02-25 NOTE — TELEPHONE ENCOUNTER
Patient called saying she saw Nery Macias a week ago for a blood clot in rt leg not getting any better. Please call and advise on next steps.

## 2022-02-28 ENCOUNTER — HOSPITAL ENCOUNTER (OUTPATIENT)
Dept: OCCUPATIONAL THERAPY | Age: 72
Setting detail: THERAPIES SERIES
Discharge: HOME | End: 2022-02-28
Payer: MEDICARE

## 2022-02-28 ENCOUNTER — HOSPITAL ENCOUNTER (OUTPATIENT)
Dept: PHYSICAL THERAPY | Age: 72
Setting detail: THERAPIES SERIES
Discharge: HOME | End: 2022-02-28
Payer: MEDICARE

## 2022-02-28 PROCEDURE — 97166 OT EVAL MOD COMPLEX 45 MIN: CPT

## 2022-02-28 PROCEDURE — 97163 PT EVAL HIGH COMPLEX 45 MIN: CPT

## 2022-02-28 ASSESSMENT — PAIN DESCRIPTION - ORIENTATION: ORIENTATION: POSTERIOR;LEFT

## 2022-02-28 ASSESSMENT — PAIN DESCRIPTION - LOCATION: LOCATION: NECK

## 2022-02-28 ASSESSMENT — PAIN DESCRIPTION - PAIN TYPE: TYPE: ACUTE PAIN;CHRONIC PAIN

## 2022-02-28 ASSESSMENT — 9 HOLE PEG TEST
TESTTIME_SECONDS: 32
TEST_RESULT: IMPAIRED
TEST_RESULT: IMPAIRED
TESTTIME_SECONDS: 61

## 2022-02-28 ASSESSMENT — PAIN DESCRIPTION - PROGRESSION: CLINICAL_PROGRESSION: GRADUALLY WORSENING

## 2022-02-28 ASSESSMENT — PAIN SCALES - GENERAL: PAINLEVEL_OUTOF10: 7

## 2022-02-28 ASSESSMENT — PAIN DESCRIPTION - FREQUENCY: FREQUENCY: CONTINUOUS

## 2022-02-28 ASSESSMENT — PAIN DESCRIPTION - DESCRIPTORS: DESCRIPTORS: ACHING;TIGHTNESS

## 2022-02-28 NOTE — CONSULTS
[x] [facility]  [address]  [place]: [phone]   F: [phone]        Physical Therapy Neurological Evaluation    Date:  2022  Patient: Mita Watson  : 1950  MRN: [id number]  Physician: Divine De La Paz MD  Insurance: [employer]; [address] - medical necessity; $40 copay  Medical Diagnosis:   R27.0 (ICD-10-CM) - Ataxia   R29.6 (ICD-10-CM) - Falls frequently     Rehab Codes: Z74.0 reduced mobility; R26.89 gait abnormality, R27.8 lack of coordination; R53.1 weakness   Date of symptom onset: ~ 1 year ago  Next 's appt.: PCP 3/1/22; 3/4/22 Neurosurgeon       Subjective:    Pt arrives to clinic ambulatory with 2WW . Pt's  drops off patient but states he is going to run errands during evals. Pt is agreeable to PT evaluation. Pt provides her own history but does have some difficulty recalling timelines of events. Pt notes her balance has been declining since her Lumbar vertebroplasty in 2020. She has had long standing cervical and lumbar stenosis. Since  has been needing to use a walker and her balance is significantly impaired. She reports frequent falls with the last fall being this morning. She was trying to  the dog dish and fell forwards. . She notes she hurt her R hand and has a bump on her forehead. She denies any headaches but does have some cervical pain -- does have C3-C6 laminectomy and fusion (May 2021) that could be affected. She states that she has had 6-7 falls in the last month. Her \"bad\" falls happen when standing and washing her hands at night. Other falls occurred when trying to move when she opens the fridge door. Her falls have lead to multiple ER visits . Pt notes that her  also has some health issues that limits how much he is able to help her. In addition to balance deficits, pt notes her L side feels different than her R.  She reprots that she deviates to the L when walking, her L eye lips droops, decreased coordination of L foot & hand, and her L side is weak and burning. Her work-ups have had concerns for CVA, but none have been found. Also pt notes she is currently being managed for RLE DVT. This has lead to swelling in the proximal R lower leg and into the knee. Patient stated Goals: to go to ARU     Pain:  [x] Yes  [] No Location: neck    Pain Rating: (0-10 scale) 7/10  Pain descriptors: notes that this pain started after her fall this morning.  -- also has a burning type pain in the R thigh from the groin to the knee. Pain altered Tx:  [x] Yes  [] No  Action:    Previous PT hx:   - ARU in February 2020 after vertebroplasty.   - SNF in May 2021 after cervical fusion  - has home health therapy that stopped coming about 2 weeks ago       PMHx: -- currently has a DVT in RLE   Past Medical History:   Diagnosis Date    Allergic rhinitis, cause unspecified     Back pain     lumbar    Bowel obstruction (HCC)     history of due to scar tissue, resolved non-surgically    C. difficile diarrhea     CAD (coronary artery disease)     no stent needed per pt.  Dr. Rendon Dinwiddie did cath at [facility]    Cardiac murmur     Cellulitis     left leg    Cellulitis 2017 August    leg left leg/bug bite    Cerebral artery occlusion with cerebral infarction [facility])     TIA 2014    COVID-19     ONE YR AGO IN 4/25/2020 fever and cough    Diverticulosis of colon (without mention of hemorrhage)     GERD (gastroesophageal reflux disease)     GERD (gastroesophageal reflux disease)     on rx    History of blood transfusion     approx 2020        History of CHF (congestive heart failure)     History of MI (myocardial infarction) 2005    thought due to a blood clot    History of ovarian cyst 1970    had oopherectomy holly    History of peritonitis 1968    due to ruptured appendix age 13    HTN (hypertension)     Hx of blood clots     right leg    Hyperlipidemia     Intestinal or peritoneal adhesions with obstruction (postoperative) (postinfection) ([facility] [address]     Kidney infection     renal failure/sepsis/spider bite    Lateral epicondylitis  of elbow     MDRO (multiple drug resistant organisms) resistance     c diff    Muscle strain     right posterior shoulder    Other abnormal glucose     PONV (postoperative nausea and vomiting)     dry heaves    Pre-diabetes     Restless legs syndrome (RLS)     Snores     no cpap    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)     TIA (transient ischemic attack) 2014    Uses walker     Vitamin D deficiency     Wears glasses     Wellness examination     last seen 2 weeks ago       Comorbidities:   [x] Obesity [] Dialysis  [x] Other: current DVT    [] Asthma/COPD [] Dementia [x] Other: multiple lumbar and cervical surgeries   [x] Stroke/TIA in 2014 [] Sleep apnea [] Other:   [] Vascular disease [] Rheumatic disease [] Other:     Tests/Imaging: refer to chart-- notable degenerative and post op changes in cervical spine imaging.        Medications: [x] Refer to full medical record [] None [] Other:  Allergies:       [x] Refer to full medical record [] None [] Other:      Function:  Hand Dominance  [] Right  [x] Left    Home Environment:   Patient lives with:     In what type of home [x]  One story   [] Two story   [] Split level -- has a basement but pt does not access    Number of stairs to enter  1 with no handrails --  has to assist       Handrail:    []  Right to enter   [] Left to enter    Stairs within the home   12 steps but pt does not access       Handrails: []  Right to ascending  [] Left to ascending   Bathroom has a []  Tub only  [x] Tub/shower combo   [] Walk in shower    []  Grab bars  -- has a shower chair, A shower head, has grab bars,    Handrails for toilet seat       Washing machine is on []  Main level   [] Second level   [x] Basement--dtr assists    Employer/Job Retried        Durable Medical Equipment:  Current DME available: has hospital bed but does not sleep here; has a lift chair that she sleeps in. Shower chair, grab bar, over the toilet seat. Rollator, 2WW     ADL/IADL Previous level of function Current level of function Who currently assists the patient with task/Comments   Bathing  [] Independent  [x] Assist [] Independent  [x] Assist dtr assists with bathing    Dress/grooming [x] Independent  [] Assist [x] Independent  [] Assist Sitting to get dressed    Transfer/ bed mobility [] Independent  [x] Assist [] Independent  [x] Assist Using the lift chair    Feeding [x] Independent  [] Assist [x] Independent  [] Assist Takes small bites and drops food a lot    Toileting [x] Independent  [] Assist [x] Independent  [] Assist Using handrails    Driving [] Independent  [x] Assist [] Independent  [x] Assist     Housekeeping [] Independent  [x] Assist [] Independent  [x] Assist dtr assists; pt notes she will occasionally mop & light dusting    Grocery shop/meal prep [] Independent  [x] Assist [] Independent  [x] Assist  grocery shopping; pt cooks in standing    Ambulation [] Independent  [x] Assist  [] Independent  [x] Assist  Using a walker; needs assistance due to deviating L        Objective:    POSTURE No deficit Deficit Not Tested Comments   Kyphosis [] [x] []    Scoliosis [] [] []    Forward Head [] [x] []    Rounded Shldrs [] [x] []    Slumped Sitting [] [x] []    Skin Integrity [] [x] [] Known DVT and swelling in RLE; has some blisters in this region           NEUROLOGICAL       Reflexes [] [] [x]    Sensation [] [x] [] Numbness that has progressed to pain in the R thigh. Notes pain is a burning feeling.     Bladder/Bowel [x] [] []    Coordination [] [x] [] Limited coordination in the feet ankles    Tone [x] [] []    Clonus [x] [] []    Tremors [] [] []    vision  x  Had cataracts removed a couple months ago       CERVICAL CLEARING:   - negative alar ligament & sharp rosalina   -  No pain with palpation centrally in lower cervical; more pain with palpation in upper cervical   - cavitation noted with cervical rotation but did not feel extremely limited or to increase pain. Range of Motion  Left Range of Motion  Right Strength  Left Strength  Right   Hip Flexion [facility] for all below  [facility] for all below 3 3   Hip Abduction     3 3   Hip Adduction     3 3   Hip Extension     3+ 3+   Knee Flexion     4 4   Knee Extension     3+ 3   Dorsiflexion     4 4   Plantar flexion     3 3   Inversion     3   3   Eversion      3 3       Spasticity/tone:  none noted with testing      Sensory/Vision: does have some dropping of L eye lid at times affecting vision; Does drift to L when ambulation; mild proprioceptive deficit of L side       FUNCTION Level of assistance Comments/observations   Bed Mobility  Did not assess as pt does no sleep in bed. Pt states she only sleeps in lift chair    -rolling     -sit to supine     -supine to sit     Transfers     -sit to stand Jennifer Weight remains posterior with cues needed for glute activation to bring weight more forward. Increased time to complete; needs UE support to stabilize   Stand to sit  Jennifer Very retropulsive; limited knee flexion to lower to chair    -pivot Jennifer Needs 2WW or UE support for balance, limited awareness of L foot placement    Balance     -sitting static IND     - sitting dynamic Supervision     -standing static Jennifer without device; CGA with devices  With 2WW    - standing dynamic CGA to Jennifer with device  With 2WW; tends to deviate to the L    Ambulation CGA with 2WW     Distance/Device: 100ft with 2WW   Analysis: L path deviation; foot flat contact; decreased stance time on L; decreased terminal hip extension, forward flexed posture; occasional catching of foot        Functional outcome measures:      Outcome Measures  2/28/22    BARRIOS 14/56 -- high fall risk     TUG  1:17s with 2WW with Jennifer to stand and CGA for ambulation  -- significant fall risk     10mWT 25.04s with 2WW and CGA >> gait speed 0.39m/s = limited household ambulator; high fall risk         02/28/22 1300   Gallardo Balance Scale   1. Sitting to Standing 1   2. Standing Unsupported 1   3. Sitting with Back Unsupported but Feet Supported on Floor or on a Stool 4   4. Standing to Sitting 0   5. Transfers 1   6. Standing Unsupported with Eyes Closed 3   7. Standing Unsupported with Feet Together 1   8. Reach Forward with Outstretched Arm While Standing 1   9.  Object from Floor from a Standing Position 1   10. Turning to Look Behind Over Left and Right Shoulders While Standing 1   11. Turn 360 Degrees 0   12. Place Alternate Foot on Step or Stool While Standing Unsupported 0   13. Standing Unsupported One Foot in Front 0   14. Standing on One Leg 0   Gallardo Balance Score 14   Gallardo Balance Disability Index 60-79%   Gallardo CMS Modifier CL       Gait speed Interpretation: Limited household ambulator  (0-0.4 m/s)        Limited community ambulator (0.4m/s-0.8m/s)        Community ambulator (0.8m/s-1.2 m/s)        Unrestricted community ambulator/normal walking speed (>1.2m/s)        Assessment:    Pt presents to PT due to imbalance and frequent falls with a decline in safety and independence since her spinal surgeries the past 2 years. She does have overall weakness and debility due to her sedentary lifestyle and medical complexity. She also has some decreased safety awareness that likely contributes to her falls and imbalance. Weakness, decreased motor planning, and limited proprioception are also contributing factors to her falls and overall fall risk. She requires Jennifer for transfers and CGA with UE support for ambulation. She needs at least Jennifer to stabilize once coming to stand. She has delayed balance reactions to which she is unable to catch herself. With functional outcome measures she presents as a high fall risk for each.  She has some family support but her  has his own medical issues to where he can not assist her in event she were to lose her balance. She also has other medical complexities such as spinal fusions and current DVTs that are a significant concern and could lead to larger issues if she continues to fall at home. Feel that with intensive PT she could improve in overall strength, balance, and develop more safety awareness that can help her be safer and more independent with navigation at home. Problems:    [x] ? Pain:  [] ? ROM:  [x] ? Strength:  [x] ? Function:  [x] Other: high fall risk, impaired safety awareness, impaired balance. GOALS  1. Pt will be educated on the purpose and benefits of inpatient rehab  To determine if this is a viable option for care. -- MET 2/28   2. Pt will bed educated on ways to increase safety at home due to high fall risk in order to prevent further falls. 3. **other goals to be determined if patient proceeds with outpatient therapy**         Rehab Potential:  [x] Good  [] Fair  [] Poor   Suggested Professional Referral:  [] No  [x] Yes:To inpatient therapy for intensive rehab services   Barriers to Goal Achievement[de-identified]  [x] No  [x] Yes: decreased safety awareness and insight   Domestic Concerns:  [] No  [x] Yes: safety in the home due to frequent falls and 's medical issues that affect how much he can help. Pt. Education:  [x] Plans/Goals, Risks/Benefits discussed  [] Home exercise program  Method of Education: [x] Verbal  [x] Demo  [] Written  Comprehension of Education:  [x] Verbalizes understanding. [x] Demonstrates understanding. [x] Needs Review. [] Demonstrates/verbalizes understanding of HEP/Ed previously given.     Treatment Plan: *if returns to outpatient PT*  [x] Therapeutic Exercise   54500  [] Iontophoresis: 4 mg/mL Dexamethasone Sodium Phosphate  mAmin  76876   [x] Therapeutic Activity  11734 [] Vasopneumatic cold with compression  65719    [x] Gait Training   53917 [] Ultrasound   32492   [x] Neuromuscular Re-education  83756 [] Electrical Stimulation Unattended  [id number]   [] Manual Therapy  90453 [] Electrical Stimulation Attended  [id number]   [x] Instruction in HEP  [] Dry Needling   [] Aquatic Therapy   [id number] [] Cold/hotpack    [] Massage   [id number]      [] Lumbar/Cervical Traction  [id number]         Frequency: Feel pt would benefit from 3 hours of rehab per day to maximize safety and independence in the home with functional mobility. If patient does not achieve inpatient rehab admission, feel an outpatient PT POC could be beneficial next steps to progressing to goals of safer and more independent mobility. Will determine frequency and plan if patient returns.       Todays Treatment:  EVAL ONLY     Evaluation Complexity:  History (Personal factors, comorbidities) [] 0 [] 1-2 [x] 3+   Exam (limitations, restrictions) [] 1-2 [] 3 [x] 4+   Clinical presentation (progression) [] Stable [] Evolving  [x] Unstable   Decision Making [] Low [] Moderate [x] High    [] Low Complexity [] Moderate Complexity [x] High Complexity       Treatment Charges: Mins Units   [x] Evaluation       []  Low       []  Moderate       [x]  High 46 1   []  Modalities     []  Ther Exercise     []  Manual Therapy     []  Ther Activities     []  Aquatics     []  Vasocompression     []  Other       TOTAL TREATMENT TIME: 46        Time In: 1834    Time Out: 1320    Electronically signed by:   Marcelina Wang PT, DPT   #[id number]

## 2022-02-28 NOTE — PROGRESS NOTES
[facility]   Occupational Therapy Evaluation  Date: 22  Patient Name: Omari Mclaughlin      MRN: [id number]  Account: [de-identified]   : 1950  (71 y.o.)  Gender: female     Referring Practitioner: Dr. Joel Bryan  Diagnosis: Ataxia R27.0; Frequent Falls R29.6  Additional Pertinent Hx: Pt has had multiple surgeries on neck, back, and L wrist.  Pt reports that she has been falling at home for over a year, but that this has gotten worse recently. Pt reports no major injuries, but did fall this morning and has bruising on R hand as well as a bump/bruise above R eyebrow. OT Visit Information  Onset Date: 02/15/22  OT Insurance Information: [facility]  Total # of Visits Approved: 1  Total # of Visits to Date: 1    Past Medical History:  has a past medical history of Allergic rhinitis, cause unspecified, Back pain, Bowel obstruction (HCC), C. difficile diarrhea, CAD (coronary artery disease), Cardiac murmur, Cellulitis, Cellulitis, Cerebral artery occlusion with cerebral infarction ([address] COVID-19, Diverticulosis of colon (without mention of hemorrhage), GERD (gastroesophageal reflux disease), GERD (gastroesophageal reflux disease), History of blood transfusion, History of CHF (congestive heart failure), History of MI (myocardial infarction), History of ovarian cyst, History of peritonitis, HTN (hypertension), Hx of blood clots, Hyperlipidemia, Intestinal or peritoneal adhesions with obstruction (postoperative) (postinfection) ([address] Kidney infection, Lateral epicondylitis  of elbow, MDRO (multiple drug resistant organisms) resistance, Muscle strain, Other abnormal glucose, PONV (postoperative nausea and vomiting), Pre-diabetes, Restless legs syndrome (RLS), Snores, 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[address] TIA (transient ischemic attack), Uses walker, Vitamin D deficiency, Wears glasses, and Wellness examination.   Past Surgical History:   has a past surgical history that includes Ovary removal (1970); colectomy (1969); Appendectomy (1968); Ovary removal (1971); Hysterectomy (1973); Bunionectomy (Left); sinus surgery (2004); Colonoscopy; other surgical history (08/14/2014); cyst removal (Right); Wrist fracture surgery (Left, 03/05/2019); Upper gastrointestinal endoscopy (N/A, 05/31/2019); Upper gastrointestinal endoscopy (N/A, 08/05/2019); Upper gastrointestinal endoscopy (N/A, 08/23/2019); Abdomen surgery (1976); lumbar fusion (N/A, 02/10/2020); Cardiac catheterization; Cardiac catheterization (2005); [place] tooth extraction; lumbar fusion (N/A, 06/17/2020); back surgery; cervical fusion (05/21/2021); and cervical fusion (N/A, 5/21/2021). Problem List: does not have any pertinent problems on file. Restrictions  Restrictions/Precautions: Fall Risk (Pt reports x6 falls in last month)  Implants present? : Metal implants (Hardware L wrist and cervical/lumbar fusion)  Other position/activity restrictions: Pt has acute DVT in R lower leg. Pain Assessment  Patient Currently in Pain: Yes  Pain Assessment: 0-10  Pain Level: 7  Pain Type: Acute pain,Chronic pain  Pain Location: Neck  Pain Orientation: Posterior,Left  Pain Descriptors: Aching,Tightness  Pain Frequency: Continuous  Clinical Progression: Gradually worsening (Pt has chronic neck pain but worse after fall this AM)  Patient's Stated Pain Goal: 4    Subjective  Subjective: \"I fell just this morning, hit my head and hurt my hand\"  Comments: Pt reports she was trying to  her dog's dishes this morning and fell forward. Pt has a bump/bruise above R eyebrow and bruising on palmar side of index, long, and ring fingers. Pt also reporting increased neck pain. OT provided general education re: fall prevention including not walking unaided at this time.   Overall Orientation Status: Within Functional Limits  Vision  Vision: Impaired  Vision Exceptions: Wears glasses for reading  Hearing  Hearing: Within functional limits    Social/Functional History  Lives With: Spouse  Type of Home: House  Home Layout: One level  Home Access: Stairs to enter without rails  Entrance Stairs - Number of Steps: 1  Bathroom Shower/Tub: Tub/Shower unit,Shower chair with back,Curtain  Bathroom Toilet: Standard  Bathroom Equipment: Grab bars in shower,Hand-held shower,Toilet raiser (RTS has B railings)  Bathroom Accessibility: Walker accessible  Home Equipment: Rolling walker,4 wheeled walker,Quad cane,Cane,Lift chair,Hospital bed,Reacher,Sock aid,Long-handled shoehorn  Receives Help From: Family  ADL Assistance: Needs assistance  Bath: Supervision (Daughter A in/out of shower)  Dressing: Supervision  Grooming: Supervision (Pt has fallen twice at night when washing hands)  Feeding: Stand by assistance (Spills a lot, eating w/non-dominant hand 2* L hand weakness)  Toileting: Independent  Homemaking Assistance: Needs assistance  Meal Prep: Moderate (Simple meals;  A with oven)  Laundry: Total (Daughter does)  Cleaning: Moderate ( helps, pt reports \"I do the best I can\")  Shopping: Total (Spouse does)  Homemaking Responsibilities: Yes  Ambulation Assistance: Independent (w/RW)  Transfer Assistance: Independent  Active : No  Patient's  Info: Family   Occupation: Retired  Type of occupation: Hair salon  Additional Comments: Spouse is retired and able to A, however pt reports that she has gained weight and he has had recent cardiac surgery making it more difficult for him to help her. Above levels are based on pt report of her typical performance of BADL/IADL tasks. IADL History  Homemaking Responsibilities: Yes  Active : No  Occupation: Retired  Type of occupation: Hair salon    Objective  Vision - [address]: Wears glasses only for reading  Patient Visual Report: No visual complaint reported.      Cognition  Overall Cognitive Status: WFL   Perception  Overall Perceptual Status: WFL  Sensation  Overall Sensation Status: Impaired  Additional Comments: Pt reports numbness/burning in R thigh, no c/o in hands     ADL  Feeding: Stand by assistance  Grooming: Supervision  UE Bathing: Supervision  LE Bathing: Contact guard assistance  UE Dressing: Minimal assistance (A to pull jacket around back to don L sleeve)  LE Dressing: Minimal assistance (Partial A to thread B feet; CGA in standing)  Toileting: Contact guard assistance  Additional Comments: Above levels based on simulated performance, skilled observation, and clinical reasoning. Pt able to manage slip-on shoes with no difficulty, but reports that she no longer wears socks 2* them being too difficult to don/doff. Pt unsteady in standing this date, recommend CGA for safety. Min A required for sit<>stand from chair and mat surfaces.     UE Function  Hand Dominance  Hand Dominance: Left     LUE Strength  Gross LUE Strength: WFL  LUE Tone: Normotonic     LUE AROM (degrees)  LUE AROM : WFL     Left Hand AROM (degrees)  Left Hand AROM: WFL    RUE Strength  Gross RUE Strength: WFL   RUE Tone: Normotonic     RUE AROM (degrees)  RUE AROM : WFL     Right Hand AROM (degrees)  Right Hand AROM: WFL    Coordination  Movements Are Fluid And Coordinated: No  Coordination and Movement description: Fine motor impairments,Gross motor impairments,Decreased speed,Decreased accuracy,Left UE   Left Hand Strength -  (lbs)  Handle Setting 2: 24, 25 - Average 24.5#     LUE Edema - Circumference (cm)  LUE Edema Present?: No  Right Hand Strength -  (lbs)  Handle Setting 2: 7, 11 - Average 9# (unable to fully  2* bruising on digits 2-4 from fall this morning)     RUE Edema - Circumference (cm)  RUE Edema Present?: No  Fine Motor Skills  Left 9-Hole Peg Test: Impaired  Left 9 Hole Peg Test Time (secs): 61  Right 9-Hole Peg Test: Impaired  Right 9 Hole Peg Test Time (secs): 32  Fine Motor Comment: Dropped one peg with L hand, reports that this happens frequently with L hand  Other Assessment: Mildly impaired finger>nose test on L side. The Upper Extremity Functional Index (UEFI)  Instruction to patient - We are interested in knowing whether you are having any difficulty at all with the activities listed below because of your upper limb problem for which you are currently seeking attention. Key: 0= Extreme Difficulty or Unable to Perfrom   1= Quite a Bit of Difficulty   2= Moderate Difficulty   3= A Little Bit of Difficulty   4= No Difficulty     02/28/22 4435   The Upper Extremity Functional Scale   Any of your usual work, housework, or school activities 3   Your usual hobbies, recreational, or sporting activities   (Pt reports that she does not do any of these tasks.)   Lifting a bag of groceries to waist level 0   Lifting a bag of groceries above your head 0   Grooming your hair 3   Pushing up on your hands (eg from bathtub/chair) 4   Preparing food (eg peeling, cutting) 3   Driving   (Pt does not drive)   Vacuuming, sweeping, or raking   (Pt does not do this task)   Dressing 2   Doing up buttons 4   Using tools or appliances 4   Opening doors 4   Cleaning 4  (Not limited by arms, limited by standing tolerance)   Tying or lacing shoes   (Pt only wears slip-ons)   Sleeping 4   Laundering clothes (eg washing, ironing, folding)  3   Opening a jar 4   Throwing a ball 4   Carrying a small suitcase with your affected limb 0   UEFS Score 57.5   UEFS Disability Index 20-39%   UEFS CMS Modifier CJ     Functional Activity Tolerance  Functional Activity Tolerance:  Tolerates 10 - 20 min exercise with multiple rests     Assessment  Performance deficits / Impairments: Decreased functional mobility ,Decreased ADL status,Decreased strength,Decreased safe awareness,Decreased endurance,Decreased balance,Decreased high-level IADLs,Decreased fine motor control,Decreased coordination  Assessment: Due to pt's increased falls at home and decreased level of IND with ADL's, I feel the patient would benefit from an intensive level of therapy. They should be able to tolerate 3-hours of Combined OT/PT/ST over 5 days/week or at least 900 minutes of  Combined Therapy over 7 days. If patient does not go to inpatient rehab, recommend continued outpatient services to address deficits, goals to be established next session. Treatment Diagnosis: Impaired coordination R27.9; Impaired ADL's; Hand weakness M62.81  Prognosis: Good  Decision Making: Medium Complexity  REQUIRES OT FOLLOW UP: Yes  Discharge Recommendations: IP Rehab    Goals  Patient Goals   Patient goals : Pt would like to be admitted to inpatient rehab. Plan  REQUIRES OT FOLLOW UP: Yes     OT Individual Minutes  Time In: 2220  Time Out: 1440  Minutes: 72  Rehab Potential:  [x] Good  [] Fair  [] Poor   Suggested Professional Referral:  [x] No  [] Yes:  Barriers to Goal Achievement:  [x] No  [] Yes: Domestic Concerns:  [x] No  [] Yes:  Treatment Plan:  Recommend inpatient rehab. [x] Plans and Risk/Benefits discussed with pt/family  Comprehension of Education [x] D/V Understanding  [] Needs Review  Pt/Family Education: [x] Verbal  [] Demo  [] Written    Medicare/Regulatory Requirements:   I have reviewed this plan of care and certify a need for Medically necessary rehabilitation services.         [x] Physician Signature                                      Date:   [facility]  [address], [address] [place] [address],[address]   [facility], [address]  Phone: [phone]  Fax: [phone]  Electronically signed by Erskine Schaumann on 2/28/22 at 3:11 PM EST    Treatment Charges:  Minutes Units Time In-Out   Ultrasound      Electrical Stim      Iontophoresis      Paraffin       Massage      Eval 65 1 [id number]   ADL       Ther Exercise      Ther Activities      Neuro Re-Ed      Splinting       Other      Total Treatment Time:  65 1

## 2022-03-01 ENCOUNTER — OFFICE VISIT (OUTPATIENT)
Dept: INTERNAL MEDICINE CLINIC | Age: 72
End: 2022-03-01
Payer: MEDICARE

## 2022-03-01 VITALS
HEIGHT: 63 IN | WEIGHT: 178 LBS | OXYGEN SATURATION: 96 % | BODY MASS INDEX: 31.54 KG/M2 | SYSTOLIC BLOOD PRESSURE: 140 MMHG | DIASTOLIC BLOOD PRESSURE: 78 MMHG | HEART RATE: 85 BPM

## 2022-03-01 DIAGNOSIS — I50.32 CHRONIC DIASTOLIC HEART FAILURE (HCC): ICD-10-CM

## 2022-03-01 DIAGNOSIS — R22.43 LOCALIZED SWELLING OF BOTH LOWER LEGS: Primary | ICD-10-CM

## 2022-03-01 DIAGNOSIS — I82.401 DEEP VEIN THROMBOSIS (DVT) OF RIGHT LOWER EXTREMITY, UNSPECIFIED CHRONICITY, UNSPECIFIED VEIN (HCC): ICD-10-CM

## 2022-03-01 PROCEDURE — 99213 OFFICE O/P EST LOW 20 MIN: CPT | Performed by: NURSE PRACTITIONER

## 2022-03-01 RX ORDER — BUMETANIDE 1 MG/1
1 TABLET ORAL 2 TIMES DAILY
Qty: 30 TABLET | Refills: 11 | Status: SHIPPED | OUTPATIENT
Start: 2022-03-01 | End: 2022-03-01

## 2022-03-01 RX ORDER — FUROSEMIDE 40 MG/1
40 TABLET ORAL 2 TIMES DAILY
Qty: 180 TABLET | Refills: 1 | Status: ON HOLD | OUTPATIENT
Start: 2022-03-01 | End: 2022-04-07 | Stop reason: SDUPTHER

## 2022-03-01 NOTE — PROGRESS NOTES
[address] [address]  Dept: [phone]  Dept Fax: [phone]    OfficeProgress/Follow Up Note  Date of patient's visit: 3/14/2022  Patient's Name:  Darnell Willis YOB: 1950            Patient Care Team:  Nyla Hemphill MD as PCP - General (Internal Medicine)  Nyla Hemphill MD as PCP - [facility]  Janeth Ramos MD as Consulting Physician (Gastroenterology)    REASON FOR VISIT:  Routine outpatient follow up    HISTORY OF PRESENT ILLNESS:        History was obtained from the patient. Darnell Willis is a 71 y.o. female here today for Neck Pain and Fall (Pt fell yesterday morning hurt neck , head and hand )    Worsening leg swelling with medication change from Bumex to Lasix.          Patient Active Problem List   Diagnosis    Other specified disorders of rotator cuff syndrome of shoulder and allied disorders    Diverticulosis of large intestine    Intestinal or peritoneal adhesions with obstruction (postoperative) (postinfection) ([address]    Restless legs syndrome (RLS)    GERD (gastroesophageal reflux disease)    Essential hypertension    Mixed hyperlipidemia    Other abnormal glucose    Atherosclerosis    Allergic rhinitis    Vitamin D deficiency    Depression    Degenerative joint disease (DJD) of hip    Peripheral edema    Injury of foot, left    Facial droop    CVA (cerebral vascular accident) ([address]    Bradycardia    Ataxia    Aphasia    TIA (transient ischemic attack)    Need for prophylactic vaccination and inoculation against cholera alone    Osteopenia    Lumbago    Meralgia paresthetica of right side    Lumbar degenerative disc disease    Spondylosis of lumbar region without myelopathy or radiculopathy    Blood poisoning    Cellulitis of left lower extremity    Encounter for medication monitoring    Deep vein thrombosis (DVT) of right lower extremity ([address]    Chronic deep vein thrombosis (DVT) of proximal vein of both lower extremities (HCC)    Chronic diastolic heart failure (HCC)    Neurogenic claudication due to lumbar spinal stenosis    Sacroiliitis (HCC)    Stage 3 chronic kidney disease (HCC)    Type 2 diabetes mellitus with circulatory disorder, without long-term current use of insulin (HCC)    Chronic deep vein thrombosis (DVT) of popliteal vein of right lower extremity (HCC)    Closed fracture of left wrist    B12 deficiency    Iron deficiency anemia secondary to inadequate dietary iron intake    Closed head injury    Scalp laceration    Abnormal finding on imaging    Other irritable bowel syndrome    Frequent falls    Double vision    Muscle soreness    Peptic ulcer    Melena    PUD (peptic ulcer disease)    Absolute anemia    Acute blood loss anemia    Acute renal failure (HCC)    Clostridium difficile infection    Acquired spondylolisthesis    Spinal stenosis of lumbar region with neurogenic claudication    Acute deep vein thrombosis (DVT) of proximal vein of left lower extremity (HCC)    Leg swelling    Back pain    Neurological disorder    Closed unstable burst fracture of fifth lumbar vertebra with routine healing    Slow transit constipation    Major depressive disorder, recurrent, moderate (HCC)    Acute deep vein thrombosis (DVT) of femoral vein of left lower extremity (HCC)    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)    Acute deep vein thrombosis (DVT) of right femoral vein (HCC)    S/P cervical spinal fusion    Gait instability       Health Maintenance Due   Topic Date Due    Diabetic retinal exam  Never done    DTaP/Tdap/Td vaccine (1 - Tdap) Never done    Shingles Vaccine (1 of 2) Never done    Flu vaccine (1) 09/01/2021    COVID-19 Vaccine (3 - Booster for Gonzalez Peter series) 09/08/2021    Annual Wellness Visit (AWV)  12/04/2021    Lipid screen  03/12/2022       MEDICATIONS:      Current Outpatient Medications Medication Sig Dispense Refill    furosemide (LASIX) 40 MG tablet Take 1 tablet by mouth 2 times daily 180 tablet 1    carvedilol (COREG) 12.5 MG tablet Take 1 tablet by mouth 2 times daily 180 tablet 3    apixaban (ELIQUIS) 5 MG TABS tablet Please take 2 tablets by mouth for the first week then take 1 tablet by mouth BID for acute DVT 90 tablet 3    atorvastatin (LIPITOR) 80 MG tablet Take 0.5 tablets by mouth nightly 90 tablet 3    lisinopril (PRINIVIL;ZESTRIL) 30 MG tablet Take 1 tablet by mouth daily 90 tablet 1    fenofibrate micronized (LOFIBRA) 134 MG capsule Take 1 capsule by mouth every morning (before breakfast) 90 capsule 3    pramipexole (MIRAPEX) 0.5 MG tablet Take 1 tablet by mouth nightly 90 tablet 3    citalopram (CELEXA) 20 MG tablet Take 1 tablet by mouth daily 90 tablet 3    docusate sodium (COLACE) 100 MG capsule Take 1 capsule by mouth 2 times daily as needed for Constipation 60 capsule 1    aspirin 81 MG chewable tablet Take 81 mg by mouth daily Indications: pt reports intstructed to hold x 10 days prior to surgery      cyanocobalamin ([employer] VITAMIN B12) 1000 MCG tablet Take 1 tablet by mouth daily 30 tablet 3    ferrous sulfate (IRON 325) 325 (65 Fe) MG tablet Take 1 tablet by mouth 2 times daily 90 tablet 2    VITAMIN D, ERGOCALCIFEROL, PO Take by mouth      Lancets MISC 1 each by Does not apply route daily 100 each 3    blood glucose monitor strips Test 2 times a day & as needed for symptoms of irregular blood glucose.  100 strip 5    Calcium Carbonate-Vitamin D (CALCIUM 500/D) 500-125 MG-UNIT TABS Take 1 tablet by mouth 2 times daily       amLODIPine (NORVASC) 10 MG tablet Take 1 tablet by mouth daily 90 tablet 2    nitroGLYCERIN (NITROSTAT) 0.4 MG SL tablet Place 1 tablet under the tongue every 5 minutes as needed for Chest pain 75 tablet 3    diphenhydrAMINE HCl (BENADRYL ALLERGY PO) Take 1 capsule by mouth daily Takes q HS       No current facility-administered medications for this visit. ALLERGIES:      Allergies   Allergen Reactions    Bactrim [Sulfamethoxazole-Trimethoprim] Other (See Comments)     sepsis    Codeine Itching    Seasonal          SOCIAL HISTORY    Reviewed and no change from previous record. Mera Parra  reports that she quit smoking about 4 years ago. Her smoking use included cigarettes. She started smoking about 26 years ago. She has a 10.00 pack-year smoking history. She has never used smokeless tobacco.    FAMILY HISTORY:    Reviewed and No change from previous visit    REVIEW OF SYSTEMS:    Review of Systems   Constitutional: Negative for chills, diaphoresis, fatigue, fever and unexpected weight change. HENT: Negative for congestion, postnasal drip, rhinorrhea, sneezing, sore throat and trouble swallowing. Eyes: Negative for visual disturbance. Respiratory: Negative for cough, chest tightness, shortness of breath and wheezing. Cardiovascular: Positive for leg swelling. Negative for chest pain. Gastrointestinal: Negative for abdominal pain, constipation, diarrhea, nausea and vomiting. Endocrine: Negative for polydipsia, polyphagia and polyuria. Genitourinary: Negative for difficulty urinating, dysuria, flank pain, frequency and urgency. Musculoskeletal: Negative for arthralgias. Skin: Negative for color change and rash. Neurological: Negative for dizziness, tremors, syncope, weakness and numbness. Psychiatric/Behavioral: Negative for confusion and hallucinations. PHYSICAL EXAM:      Vitals:    03/01/22 1049   BP: (!) 140/78   Pulse: 85   SpO2: 96%   Weight: 178 lb (80.7 kg)   Height: 5' 3\" (1.6 m)     BP Readings from Last 3 Encounters:   03/08/22 134/82   03/04/22 138/84   03/01/22 (!) 140/78      Wt Readings from Last 3 Encounters:   03/08/22 185 lb 12.8 oz (84.3 kg)   03/04/22 189 lb (85.7 kg)   03/01/22 178 lb (80.7 kg)       Physical Exam  Vitals reviewed.    Constitutional:       Appearance: Normal appearance. HENT:      Head: Normocephalic. Neck:      Vascular: No carotid bruit. Cardiovascular:      Rate and Rhythm: Normal rate and regular rhythm. Pulses: Normal pulses. Heart sounds: Normal heart sounds. Pulmonary:      Effort: Pulmonary effort is normal.      Breath sounds: Normal breath sounds. Abdominal:      General: Bowel sounds are normal.      Palpations: Abdomen is soft. Musculoskeletal:         General: No swelling, tenderness or deformity. Normal range of motion. Cervical back: Normal range of motion. Right lower leg: Edema present. Left lower leg: Edema present. Skin:     General: Skin is warm and dry. Neurological:      General: No focal deficit present. Mental Status: She is alert and oriented to person, place, and time. Psychiatric:         Mood and Affect: Mood normal.         Behavior: Behavior normal.         Thought Content: Thought content normal.         Judgment: Judgment normal.          LABORATORY FINDINGS:    CBC:  Lab Results   Component Value Date    WBC 11.2 12/22/2021    HGB 12.0 12/22/2021     12/22/2021       BMP:    Lab Results   Component Value Date     02/16/2022    K 4.0 02/16/2022     02/16/2022    CO2 26 02/16/2022    BUN 26 02/16/2022    CREATININE 0.80 02/16/2022    GLUCOSE 103 02/16/2022       HEMOGLOBIN A1C:   Lab Results   Component Value Date    LABA1C 5.1 02/01/2022       FASTING LIPID PANEL:  Lab Results   Component Value Date    CHOL 103 05/20/2019    HDL 74 03/12/2021    TRIG 66 05/20/2019       ASSESSMENT AND PLAN:      1. Localized swelling of both lower legs  - place back on lasix, continue to wrap both legs and elevate   - furosemide (LASIX) 40 MG tablet; Take 1 tablet by mouth 2 times daily  Dispense: 180 tablet; Refill: 1    2. Chronic diastolic heart failure (HCC)  - furosemide (LASIX) 40 MG tablet; Take 1 tablet by mouth 2 times daily  Dispense: 180 tablet; Refill: 1    3.  Deep vein

## 2022-03-02 ENCOUNTER — TELEPHONE (OUTPATIENT)
Dept: PHYSICAL MEDICINE AND REHAB | Age: 72
End: 2022-03-02

## 2022-03-03 NOTE — TELEPHONE ENCOUNTER
Vanessa updated in admissions for [facility] and requested submission to [facility] for acute rehab admission.

## 2022-03-04 ENCOUNTER — OFFICE VISIT (OUTPATIENT)
Dept: NEUROSURGERY | Age: 72
End: 2022-03-04
Payer: MEDICARE

## 2022-03-04 VITALS
RESPIRATION RATE: 18 BRPM | OXYGEN SATURATION: 98 % | SYSTOLIC BLOOD PRESSURE: 138 MMHG | WEIGHT: 189 LBS | HEIGHT: 63 IN | TEMPERATURE: 97.2 F | BODY MASS INDEX: 33.49 KG/M2 | HEART RATE: 76 BPM | DIASTOLIC BLOOD PRESSURE: 84 MMHG

## 2022-03-04 DIAGNOSIS — R27.0 ATAXIA: Primary | ICD-10-CM

## 2022-03-04 DIAGNOSIS — Z98.1 S/P CERVICAL SPINAL FUSION: ICD-10-CM

## 2022-03-04 DIAGNOSIS — R26.81 GAIT INSTABILITY: ICD-10-CM

## 2022-03-04 PROCEDURE — 99214 OFFICE O/P EST MOD 30 MIN: CPT | Performed by: NEUROLOGICAL SURGERY

## 2022-03-04 NOTE — PROGRESS NOTES
Department of Neurosurgery                                                      Follow up visit      History Obtained From: patient,     CHIEF COMPLAINT:         Chief Complaint   Patient presents with    Follow-up     6 week follow up MRI cervical Ref PMR        HISTORY OF PRESENT ILLNESS:       The patient is a 71 y.o. female who presents for follow up for stenosis of cervical spine with myelopathy, gait instability, s/p cervical spinal fusion, and meralgia paresthetica of right side. The patient reports that she is not able to walk and presents in the office with a walker to help with ambulating. Patient reports numbness and nerve pain in her right thigh. Patient has had several falls recently where she was evaluated in the emergency department. No acute findings according to the evaluation apart from subcutaneous hematoma to left scalp. She reports that her legs occasionally feel weak. Reports that her handwriting has gotten small (left-handed). Denies neck, knee, hip, and back pain. Denies any hand pain or numbness       PAST MEDICAL HISTORY :       Past Medical History:        Diagnosis Date    Allergic rhinitis, cause unspecified     Back pain     lumbar    Bowel obstruction (HCC)     history of due to scar tissue, resolved non-surgically    C. difficile diarrhea     CAD (coronary artery disease)     no stent needed per pt.  Dr. Sha Leon did cath at  2005    Cardiac murmur     Cellulitis     left leg    Cellulitis 2017 August    leg left leg/bug bite    Cerebral artery occlusion with cerebral infarction [facility])     TIA 2014    COVID-19     ONE YR AGO IN 4/25/2020 fever and cough    Diverticulosis of colon (without mention of hemorrhage)     GERD (gastroesophageal reflux disease)     GERD (gastroesophageal reflux disease)     on rx    History of blood transfusion     approx 2020        History of CHF (congestive heart failure)     History of MI (myocardial infarction) 2005    thought due to a blood clot    History of ovarian cyst 1970    had oopherectomy holly    History of peritonitis 1968    due to ruptured appendix age 13    HTN (hypertension)     Hx of blood clots     right leg    Hyperlipidemia     Intestinal or peritoneal adhesions with obstruction (postoperative) (postinfection) ([address]     Kidney infection     renal failure/sepsis/spider bite    Lateral epicondylitis  of elbow     MDRO (multiple drug resistant organisms) resistance     c diff    Muscle strain     right posterior shoulder    Other abnormal glucose     PONV (postoperative nausea and vomiting)     dry heaves    Pre-diabetes     Restless legs syndrome (RLS)     Snores     no cpap    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)     TIA (transient ischemic attack) 2014    Uses walker     Vitamin D deficiency     Wears glasses     Wellness examination     last seen 2 weeks ago       Past Surgical History:        Procedure Laterality Date    ABDOMEN SURGERY  1976    benign tumor removed near remaining ovary, 1.5 pounds    APPENDECTOMY  1968    appendix ruptured, developed peritonitis    BACK SURGERY      BUNIONECTOMY Left     along with calcium deposits removed   [address]  2005    negative    CERVICAL FUSION  05/21/2021    POSTERIOR C3-6 LAMINECTOMY, PARTIAL C7 LAMINECTOMY, FUSION C3-C6, SILVERCORD    CERVICAL FUSION N/A 5/21/2021    POSTERIOR C3-6 LAMINECTOMY, PARTIAL C7 LAMINECTOMY, FUSION C3-C6, SILVERCORD performed by Nica De La Fuente DO at [address]    12 INCHES REMOVED D/T OBSTRUCTION    COLONOSCOPY      CYST REMOVAL Right     right facial    HYSTERECTOMY  1973    taken as a result of recurring cysts    LUMBAR FUSION N/A 02/10/2020    LUMBAR L4-5 POSTERIOR  DECOMPRESSION INSTRUMENTATION FUSION WCEMENT AUGMENTATION/ performed by Leila Castle MD at [place] [address] N/A 06/17/2020 L5-S1 PLIF L4-L5 REVISION performed by Lori Deras MD at [address]  2014    FESS   [address] due to cyst    OVARY REMOVAL  1971    partial, due to cyst   [employer] SINUS SURGERY      UPPER GASTROINTESTINAL ENDOSCOPY N/A 2019    EGD ESOPHAGOGASTRODUODENOSCOPY performed by Ousmane Roger MD at [address] [facility] N/A 2019    EGD BIOPSY performed by Thom Santos MD at [facility] N/A 2019    EGD BIOPSY performed by Ousmane Roger MD at [address] [facility] 2019    WRIST OPEN REDUCTION INTERNAL FIXATION performed by Adriana Patterson MD at [address] [facility] History:   Social History     Socioeconomic History    Marital status:      Spouse name: Not on file    Number of children: Not on file    Years of education: Not on file    Highest education level: Not on file   Occupational History    Occupation: retired   Tobacco Use    Smoking status: Former Smoker     Packs/day: 0.50     Years: 20.00     Pack years: 10.00     Types: Cigarettes     Start date: 1995     Quit date: 2017     Years since quittin.7    Smokeless tobacco: Never Used   Vaping Use    Vaping Use: Never used   Substance and Sexual Activity    Alcohol use: No     Alcohol/week: 0.0 standard drinks    Drug use: No    Sexual activity: Not on file   Other Topics Concern    Not on file   Social History Narrative    Not on file     Social Determinants of Health     Financial Resource Strain: Low Risk     Difficulty of Paying Living Expenses: Not hard at all   Food Insecurity: No Food Insecurity    Worried About [address] in the Last Year: Never true    [facility] in the Last Year: Never true   Transportation Needs:     Lack of Transportation (Medical): Not on file    Lack of Transportation (Non-Medical):  Not on file   Physical Activity:     Days of Exercise per Week: Not on file    Minutes of Exercise per Session: Not on file   Stress:     Feeling of Stress : Not on file   Social Connections:     Frequency of Communication with Friends and Family: Not on file    Frequency of Social Gatherings with Friends and Family: Not on file    Attends Denominational Services: Not on file    Active Member of [address] [place] [address] or Organizations: Not on file    Attends Club or Organization Meetings: Not on file    Marital Status: Not on file   Intimate Partner Violence:     Fear of Current or Ex-Partner: Not on file    Emotionally Abused: Not on file    Physically Abused: Not on file    Sexually Abused: Not on file   Housing Stability:     Unable to Pay for Housing in the Last Year: Not on file    Number of [place] in the Last Year: Not on file    Unstable Housing in the Last Year: Not on file       Family History:       Problem Relation Age of Onset    Stroke Mother     Diabetes Mother     Heart Disease Mother     High Blood Pressure Mother     Heart Disease Father     Heart Disease Brother     High Blood Pressure Brother     Heart Disease Maternal Grandmother     High Blood Pressure Sister        Allergies:  Bactrim [sulfamethoxazole-trimethoprim], Codeine, and Seasonal    Home Medications:  Prior to Admission medications    Medication Sig Start Date End Date Taking?  Authorizing Provider   furosemide (LASIX) 40 MG tablet Take 1 tablet by mouth 2 times daily 3/1/22  Yes MAIK Sheppard CNP   carvedilol (COREG) 12.5 MG tablet Take 1 tablet by mouth 2 times daily 2/23/22  Yes MAIK Sheppard CNP   apixaban (ELIQUIS) 5 MG TABS tablet Please take 2 tablets by mouth for the first week then take 1 tablet by mouth BID for acute DVT 2/17/22  Yes MAIK Sheppard CNP   atorvastatin (LIPITOR) 80 MG tablet Take 0.5 tablets by mouth nightly 2/1/22  Yes MAIK Sheppard CNP   lisinopril (PRINIVIL;ZESTRIL) 30 MG tablet Take 1 tablet by mouth daily 1/21/22  Yes Ruth Mohan MD   fenofibrate micronized (LOFIBRA) 134 MG capsule Take 1 capsule by mouth every morning (before breakfast) 1/13/22  Yes Ruth Mohan MD   pramipexole (MIRAPEX) 0.5 MG tablet Take 1 tablet by mouth nightly 1/6/22  Yes Ruth Mohan MD   citalopram (CELEXA) 20 MG tablet Take 1 tablet by mouth daily 1/3/22  Yes Jo Feliz MD   amLODIPine (NORVASC) 10 MG tablet Take 1 tablet by mouth daily 9/8/21  Yes Ruth Mohan MD   nitroGLYCERIN (NITROSTAT) 0.4 MG SL tablet Place 1 tablet under the tongue every 5 minutes as needed for Chest pain 9/1/21  Yes Ruth Mohan MD   docusate sodium (COLACE) 100 MG capsule Take 1 capsule by mouth 2 times daily as needed for Constipation 5/30/21  Yes Alfie Marley DO   aspirin 81 MG chewable tablet Take 81 mg by mouth daily Indications: pt reports intstructed to hold x 10 days prior to surgery   Yes Historical Provider, MD   diphenhydrAMINE HCl (BENADRYL ALLERGY PO) Take 1 capsule by mouth daily Takes q HS   Yes Historical Provider, MD   cyanocobalamin ([employer] VITAMIN B12) 1000 MCG tablet Take 1 tablet by mouth daily 12/3/20  Yes Ruth Mohan MD   ferrous sulfate (IRON 325) 325 (65 Fe) MG tablet Take 1 tablet by mouth 2 times daily 7/2/20  Yes Ousmane Roger MD   VITAMIN D, ERGOCALCIFEROL, PO Take by mouth   Yes Historical Provider, MD   Lancets MISC 1 each by Does not apply route daily 10/10/19  Yes Radha Henderson MD   blood glucose monitor strips Test 2 times a day & as needed for symptoms of irregular blood glucose. 10/10/19  Yes Radha Henderson MD   Calcium Carbonate-Vitamin D (CALCIUM 500/D) 500-125 MG-UNIT TABS Take 1 tablet by mouth 2 times daily    Yes Historical Provider, MD       Current Medications:   No current facility-administered medications for this visit.       PHYSICAL EXAM:       /84 (Site: Right Upper Arm, Position: Sitting, Cuff Size: Large Adult)   Pulse 76   Temp 97.2 °F (36.2 °C) (Temporal)   Resp 18   Ht 5' 3\" (1.6 m)   Wt 189 lb (85.7 kg)   SpO2 98%   BMI 33.48 kg/m²   Physical Exam     Gen: NAD  HEENT: moist mucus membranes  Cardio: RRR  Pulm: chest rise symmetrically  GI: abd soft  Ext: no edema  Skin: warm    Neuro:    AOX3  CN 2-12 grossly intact  Speech articulate  Motor 5/5  No pronator drift  Sensation symmetrical   Stable balance with arms forward  Negative Rombergs sign   Left upper extremity slight increase in tone compared to the right    Shuffling Gait  No Robles Sign  Slight masked facies    Radiology Review:      MRI Cervical Spine WO Contrast  Ordered By: Ailyn Washington, APRN - CNP  1/26/2022  Official Read:  Posterior fixation and laminectomy from C3-C6 without evidence for  complication.     Multilevel degenerative change with bilateral foraminal narrowing.     My Read:    ASSESSMENT AND PLAN:       Patient Active Problem List   Diagnosis    Other specified disorders of rotator cuff syndrome of shoulder and allied disorders    Diverticulosis of large intestine    Intestinal or peritoneal adhesions with obstruction (postoperative) (postinfection) ([address]    Restless legs syndrome (RLS)    GERD (gastroesophageal reflux disease)    Essential hypertension    Mixed hyperlipidemia    Other abnormal glucose    Atherosclerosis    Allergic rhinitis    Vitamin D deficiency    Depression    Degenerative joint disease (DJD) of hip    Peripheral edema    Injury of foot, left    Facial droop    CVA (cerebral vascular accident) ([address]    Bradycardia    Ataxia    Aphasia    TIA (transient ischemic attack)    Need for prophylactic vaccination and inoculation against cholera alone    Osteopenia    Lumbago    Meralgia paresthetica of right side    Lumbar degenerative disc disease    Spondylosis of lumbar region without myelopathy or radiculopathy    Blood poisoning    Cellulitis of left lower extremity    Encounter for medication monitoring    Deep vein thrombosis (DVT) of right lower extremity (HCC)    Chronic deep vein thrombosis (DVT) of proximal vein of both lower extremities (HCC)    Chronic diastolic heart failure (HCC)    Neurogenic claudication due to lumbar spinal stenosis    Sacroiliitis (HCC)    Stage 3 chronic kidney disease (HCC)    Type 2 diabetes mellitus with circulatory disorder, without long-term current use of insulin (HCC)    Chronic deep vein thrombosis (DVT) of popliteal vein of right lower extremity (HCC)    Closed fracture of left wrist    B12 deficiency    Iron deficiency anemia secondary to inadequate dietary iron intake    Closed head injury    Scalp laceration    Abnormal finding on imaging    Other irritable bowel syndrome    Frequent falls    Double vision    Muscle soreness    Peptic ulcer    Melena    PUD (peptic ulcer disease)    Absolute anemia    Acute blood loss anemia    Acute renal failure (HCC)    Clostridium difficile infection    Acquired spondylolisthesis    Spinal stenosis of lumbar region with neurogenic claudication    Acute deep vein thrombosis (DVT) of proximal vein of left lower extremity (HCC)    Leg swelling    Back pain    Neurological disorder    Closed unstable burst fracture of fifth lumbar vertebra with routine healing    Slow transit constipation    Major depressive disorder, recurrent, moderate (HCC)    Acute deep vein thrombosis (DVT) of femoral vein of left lower extremity (HCC)    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)    Acute deep vein thrombosis (DVT) of right femoral vein (HCC)    S/P cervical spinal fusion    Gait instability         A/P:  This is a 71 y.o. female with Cydney Condon MD, Neurology, [place]  -     MRI THORACIC SPINE WO CONTRAST; Future  S/P cervical spinal fusion  Gait instability  -     Jordan Hernandez MD, Neurology, [place]  -     MRI THORACIC SPINE WO CONTRAST;  Future       There is no further compression from the standpoint of cervical and lumbar spine    Her ataxia, left hand loss of dexterity and falls may possibility be parkinsonism or other movement disorder. She also a emotional lability. I recommend evaluation with a neurologist, Dr. Kirill Lopez. I will order an MRI of thoracic spine for completeness and follow-up in 2 months. Patient and/or family was counseled on the diagnosis and treatment plan  By signing my name below, I, Luan An, attest that this documentation has been prepared under the direction and in the presence of Jeannie Wu DO. Electronically signed: Wil Malloy, 3/4/22     I, Roberto Chan, personally performed the services described in this documentation. All medical record entries made by the scribe were at my direction and in my presence. I have reviewed the chart and discharge instructions and agree that the records reflect my personal performance and is accurate and complete. Roberto Chan DO. Board certified neurosurgeon 3/4/22     This note was created using voice recognition software. There may be inaccuracies of transcription  that are inadvertently overlooked prior to the signature. There is any questions about the transcription please contact me.

## 2022-03-08 ENCOUNTER — OFFICE VISIT (OUTPATIENT)
Dept: INTERNAL MEDICINE CLINIC | Age: 72
End: 2022-03-08
Payer: MEDICARE

## 2022-03-08 VITALS
BODY MASS INDEX: 32.92 KG/M2 | OXYGEN SATURATION: 95 % | SYSTOLIC BLOOD PRESSURE: 134 MMHG | DIASTOLIC BLOOD PRESSURE: 82 MMHG | WEIGHT: 185.8 LBS | HEIGHT: 63 IN | HEART RATE: 77 BPM

## 2022-03-08 DIAGNOSIS — Z12.11 SCREENING FOR COLON CANCER: ICD-10-CM

## 2022-03-08 DIAGNOSIS — E11.59 TYPE 2 DIABETES MELLITUS WITH OTHER CIRCULATORY COMPLICATION, WITHOUT LONG-TERM CURRENT USE OF INSULIN (HCC): ICD-10-CM

## 2022-03-08 DIAGNOSIS — Z13.220 SCREENING FOR HYPERLIPIDEMIA: ICD-10-CM

## 2022-03-08 DIAGNOSIS — I82.401 DEEP VEIN THROMBOSIS (DVT) OF RIGHT LOWER EXTREMITY, UNSPECIFIED CHRONICITY, UNSPECIFIED VEIN (HCC): Primary | ICD-10-CM

## 2022-03-08 PROCEDURE — 99214 OFFICE O/P EST MOD 30 MIN: CPT | Performed by: INTERNAL MEDICINE

## 2022-03-08 NOTE — PROGRESS NOTES
[facility]  [facility]  [address] [address]  Dept: [phone]  Dept Fax: [phone]     Name: Mehnaz Silva  : 1950           Chief Complaint   Patient presents with   Humphreys Fall     in kitchen- hit face on refridgerator    Edema     follow-up bilateral legs- getting higher up leg       History of Present Illness:    HPI    Patient came in Right leg swelling. Patient has history of Right blood clot and was only taking aspirin. Currently eliquis was prescribed. Venous Doppler was done on 2022:  Right:   Acute deep venous thrombosis identified in the common femoral vein. She has scheduled appointment with neurology. Past Medical History:    Past Medical History:   Diagnosis Date    Allergic rhinitis, cause unspecified     Back pain     lumbar    Bowel obstruction (HCC)     history of due to scar tissue, resolved non-surgically    C. difficile diarrhea     CAD (coronary artery disease)     no stent needed per pt.  Dr. Yasir Valentin did cath at      Cardiac murmur     Cellulitis     left leg    Cellulitis 2017    leg left leg/bug bite    Cerebral artery occlusion with cerebral infarction [facility])     TIA 2014    COVID-19     ONE YR AGO IN 2020 fever and cough    Diverticulosis of colon (without mention of hemorrhage)     GERD (gastroesophageal reflux disease)     GERD (gastroesophageal reflux disease)     on rx    History of blood transfusion     approx         History of CHF (congestive heart failure)     History of MI (myocardial infarction)     thought due to a blood clot    History of ovarian cyst     had oopherectomy holly    History of peritonitis     due to ruptured appendix age 13    HTN (hypertension)     Hx of blood clots     right leg    Hyperlipidemia     Intestinal or peritoneal adhesions with obstruction (postoperative) (postinfection) ([address]     Kidney infection     renal failure/sepsis/spider bite    Lateral epicondylitis  of elbow     MDRO (multiple drug resistant organisms) resistance     c diff    Muscle strain     right posterior shoulder    Other abnormal glucose     PONV (postoperative nausea and vomiting)     dry heaves    Pre-diabetes     Restless legs syndrome (RLS)     Snores     no cpap    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)     TIA (transient ischemic attack) 2014    Uses walker     Vitamin D deficiency     Wears glasses     Wellness examination     last seen 2 weeks ago      Reviewed all health maintenance requirements and ordered appropriate tests  Health Maintenance Due   Topic Date Due    Diabetic retinal exam  Never done    DTaP/Tdap/Td vaccine (1 - Tdap) Never done    Shingles Vaccine (1 of 2) Never done    Flu vaccine (1) 09/01/2021    COVID-19 Vaccine (3 - Booster for Gonzalez Peter series) 09/08/2021    Annual Wellness Visit (AWV)  12/04/2021    Lipid screen  03/12/2022       Past Surgical History:    Past Surgical History:   Procedure Laterality Date    ABDOMEN SURGERY  1976    benign tumor removed near remaining ovary, 1.5 pounds    APPENDECTOMY  1968    appendix ruptured, developed peritonitis    BACK SURGERY      BUNIONECTOMY Left     along with calcium deposits removed   [address]  2005    negative    CERVICAL FUSION  05/21/2021    POSTERIOR C3-6 LAMINECTOMY, PARTIAL C7 LAMINECTOMY, FUSION C3-C6, SILVERCORD    CERVICAL FUSION N/A 5/21/2021    POSTERIOR C3-6 LAMINECTOMY, PARTIAL C7 LAMINECTOMY, FUSION C3-C6, SILVERCORD performed by Anna Myers DO at [address] [place] [address]    12 INCHES REMOVED D/T OBSTRUCTION    COLONOSCOPY      CYST REMOVAL Right     right facial    HYSTERECTOMY  1973    taken as a result of recurring cysts    LUMBAR FUSION N/A 02/10/2020    LUMBAR L4-5 POSTERIOR  DECOMPRESSION INSTRUMENTATION FUSION WCEMENT AUGMENTATION/ performed by Elieser Spicer MD at [facility] FUSION N/A 06/17/2020    L5-S1 PLIF L4-L5 REVISION performed by Rogelio Shaffer MD [place] [address] [place] [facility]  08/14/2014    FESS   [address]    UNILATERAL due to cyst    OVARY REMOVAL  1971    partial, due to cyst    SINUS SURGERY  2004    UPPER GASTROINTESTINAL ENDOSCOPY N/A 05/31/2019    EGD ESOPHAGOGASTRODUODENOSCOPY performed by Andrei Schmid MD at [facility]. N/A 08/05/2019    EGD BIOPSY performed by Bill Angel MD at [place] [address] N/A 08/23/2019    EGD BIOPSY performed by Andrei Schmid MD at [address] [facility] 03/05/2019    WRIST OPEN REDUCTION INTERNAL FIXATION performed by Oseas Aj MD at [address] [facility] OR        Medications:      Current Outpatient Medications:     furosemide (LASIX) 40 MG tablet, Take 1 tablet by mouth 2 times daily, Disp: 180 tablet, Rfl: 1    carvedilol (COREG) 12.5 MG tablet, Take 1 tablet by mouth 2 times daily, Disp: 180 tablet, Rfl: 3    apixaban (ELIQUIS) 5 MG TABS tablet, Please take 2 tablets by mouth for the first week then take 1 tablet by mouth BID for acute DVT, Disp: 90 tablet, Rfl: 3    atorvastatin (LIPITOR) 80 MG tablet, Take 0.5 tablets by mouth nightly, Disp: 90 tablet, Rfl: 3    lisinopril (PRINIVIL;ZESTRIL) 30 MG tablet, Take 1 tablet by mouth daily, Disp: 90 tablet, Rfl: 1    fenofibrate micronized (LOFIBRA) 134 MG capsule, Take 1 capsule by mouth every morning (before breakfast), Disp: 90 capsule, Rfl: 3    pramipexole (MIRAPEX) 0.5 MG tablet, Take 1 tablet by mouth nightly, Disp: 90 tablet, Rfl: 3    citalopram (CELEXA) 20 MG tablet, Take 1 tablet by mouth daily, Disp: 90 tablet, Rfl: 3    nitroGLYCERIN (NITROSTAT) 0.4 MG SL tablet, Place 1 tablet under the tongue every 5 minutes as needed for Chest pain, Disp: 75 tablet, Rfl: 3    docusate sodium (COLACE) 100 MG capsule, Take 1 capsule by mouth 2 times daily as needed for Constipation, Disp: 60 capsule, Rfl: 1    aspirin 81 MG chewable tablet, Take 81 mg by mouth daily Indications: pt reports intstructed to hold x 10 days prior to surgery, Disp: , Rfl:     diphenhydrAMINE HCl (BENADRYL ALLERGY PO), Take 1 capsule by mouth daily Takes q HS, Disp: , Rfl:     cyanocobalamin ([employer] VITAMIN B12) 1000 MCG tablet, Take 1 tablet by mouth daily, Disp: 30 tablet, Rfl: 3    ferrous sulfate (IRON 325) 325 (65 Fe) MG tablet, Take 1 tablet by mouth 2 times daily, Disp: 90 tablet, Rfl: 2    VITAMIN D, ERGOCALCIFEROL, PO, Take by mouth, Disp: , Rfl:     Lancets MISC, 1 each by Does not apply route daily, Disp: 100 each, Rfl: 3    blood glucose monitor strips, Test 2 times a day & as needed for symptoms of irregular blood glucose., Disp: 100 strip, Rfl: 5    Calcium Carbonate-Vitamin D (CALCIUM 500/D) 500-125 MG-UNIT TABS, Take 1 tablet by mouth 2 times daily , Disp: , Rfl:     amLODIPine (NORVASC) 10 MG tablet, Take 1 tablet by mouth daily, Disp: 90 tablet, Rfl: 2    Allergies:      Bactrim [sulfamethoxazole-trimethoprim], Codeine, and Seasonal    Social History:    Tobacco:    reports that she quit smoking about 4 years ago. Her smoking use included cigarettes. She started smoking about 26 years ago. She has a 10.00 pack-year smoking history. She has never used smokeless tobacco.  Alcohol:      reports no history of alcohol use. Drug Use:  reports no history of drug use.     Family History:    Family History   Problem Relation Age of Onset    Stroke Mother     Diabetes Mother     Heart Disease Mother     High Blood Pressure Mother     Heart Disease Father     Heart Disease Brother     High Blood Pressure Brother     Heart Disease Maternal Grandmother     High Blood Pressure Sister        Review of Systems:    Positive and Negative as described in HPI    Constitutional:  negative for  fevers, chills, sweats, fatigue, and weight loss  HEENT:  negative for vision or hearing changes,   Respiratory:  negative for shortness of breath, cough, or congestion  Cardiovascular:  negative for  chest pain, palpitations  Gastrointestinal:  negative for nausea, vomiting, diarrhea, constipation, abdominal pain  Genitourinary:  negative for frequency, dysuria  Integument/Breast:  negative for rash, skin lesions  Musculoskeletal:  negative for muscle aches or joint pain  Neurological:  negative for headaches, dizziness, lightheadedness, numbness, pain and tingling extrimities  Behavior/Psych:  negative for depression and anxiety      Physical Exam:    Vitals:  /82 (Site: Right Upper Arm, Position: Sitting)   Pulse 77   Ht 5' 3\" (1.6 m)   Wt 185 lb 12.8 oz (84.3 kg)   SpO2 95%   BMI 32.91 kg/m²     General appearance - alert, well appearing, and in no acute distress  Mental status - oriented to person, place, and time with normal affect  Head - normocephalic and atraumatic  Eyes - pupils equal and reactive, extraocular eye movements intact, conjunctiva clear  Ears - hearing appears to be intact  Nose - no drainage noted  Mouth - mucous membranes moist  Neck - supple, no carotid bruits, thyroid not palpable  Chest - clear to auscultation, normal effort  Heart - normal rate, regular rhythm, no murmurs  Abdomen - soft, nontender, nondistended, bowel sounds present all four quadrants, no masses, hepatomegaly or splenomegaly  Neurological - normal speech, no focal findings or movement disorder noted, cranial nerves II through XII grossly intact  Extremities - peripheral pulses palpable, Right lower leg swelling  Skin - no gross lesions, rashes, or induration noted      Data:    Lab Results   Component Value Date     02/16/2022    K 4.0 02/16/2022     02/16/2022    CO2 26 02/16/2022    BUN 26 02/16/2022    CREATININE 0.80 02/16/2022    GLUCOSE 103 02/16/2022    PROT 7.6 12/22/2021    LABALBU 4.6 12/22/2021    BILITOT 0.23 12/22/2021    ALKPHOS 44 12/22/2021    AST 16 12/22/2021 ALT 12 12/22/2021     Lab Results   Component Value Date    WBC 11.2 12/22/2021    RBC 3.75 12/22/2021    HGB 12.0 12/22/2021    HCT 36.5 12/22/2021    MCV 97.3 12/22/2021    MCH 31.9 12/22/2021    MCHC 32.7 12/22/2021    RDW 13.2 12/22/2021     12/22/2021    MPV 7.0 12/22/2021     Lab Results   Component Value Date    TSH 1.43 05/20/2019     Lab Results   Component Value Date    CHOL 103 05/20/2019    HDL 74 03/12/2021    LABA1C 5.1 02/01/2022          Assessment & Plan:     Diagnosis Orders   1. Deep vein thrombosis (DVT) of right lower extremity, unspecified chronicity, unspecified vein (HCC)     2. Type 2 diabetes mellitus with other circulatory complication, without long-term current use of insulin ([facility])   DIABETES FOOT EXAM   3. Screening for colon cancer  FIT-DNA (Cologuard)   4. Screening for hyperlipidemia  Lipid, Fasting       1. Deep vein thrombosis (DVT) of right lower extremity, unspecified chronicity, unspecified vein (HCC)  Assessment & Plan:  Continue Eliquis  Continue Meds  Follow with Cardiology  Strongly advised to use walker and avoids fall    2. Type 2 diabetes mellitus with other circulatory complication, without long-term current use of insulin ([facility])  -      DIABETES FOOT EXAM  3. Screening for colon cancer  -     FIT-DNA (Cologuard); Future  4. Screening for hyperlipidemia  -     Lipid, Fasting; Future     Acute DVT,  On Eliquis 5 mg twice a day,  Hypertension,  Blood pressure running well, continue home medication,  Anemia of chronic disease, monitor hemoglobin,  DM:  Discussed in detail about the Diabetes, lab results, importance of diet/carb control, exercise, and med compliance. Medication side effects discussed in detail and has none today. Micro and Macrovascular complications discussed with patient today. Patient verbalized understanding.      Hemoglobin A1C   Date Value Ref Range Status   02/01/2022 5.1 % Final   02/22/2021 5.2 % Final   03/04/2020 5.7 % Final       Lab Results   Component Value Date    LDLCHOLESTEROL 50 03/12/2021       Lab Results   Component Value Date    LABMICR 11 08/05/2015     HLD:    Discussed in detail about hld, lab results, importance of heart healthy/low fat diet, exercise, and med compliance. Medication side effects discussed in detail and has none today. Patient verbalized understanding. BP Readings from Last 3 Encounters:   03/08/22 134/82   03/04/22 138/84   03/01/22 (!) 140/78        Lab Results   Component Value Date    CHOL 103 05/20/2019    CHOL 119 12/27/2017    CHOL 116 07/01/2017     Lab Results   Component Value Date    TRIG 66 05/20/2019    TRIG 62 12/27/2017    TRIG 59 07/01/2017     Lab Results   Component Value Date    HDL 74 03/12/2021    HDL 48 05/20/2019    HDL 73 12/27/2017     Lab Results   Component Value Date    LDLCHOLESTEROL 50 03/12/2021    LDLCHOLESTEROL 42 05/20/2019    LDLCHOLESTEROL 34 12/27/2017     Lab Results   Component Value Date    VLDL NOT REPORTED 03/12/2021    VLDL NOT REPORTED 05/20/2019    VLDL NOT REPORTED 12/27/2017     Lab Results   Component Value Date    CHOLHDLRATIO 1.9 03/12/2021    CHOLHDLRATIO 2.1 05/20/2019    CHOLHDLRATIO 1.6 12/27/2017        Lab Results   Component Value Date    ALT 12 12/22/2021    AST 16 12/22/2021    ALKPHOS 44 12/22/2021    BILITOT 0.23 (L) 12/22/2021        Fall in the kitchen,  Patient wheelchair, strongly advised to be watchful of falls,,  Recent DVT, cannot take Eliquis away from her as she will be high risk for thromboembolism,  Continue at this time            Completed Refills   Requested Prescriptions      No prescriptions requested or ordered in this encounter     Return in about 4 weeks (around 4/5/2022). No orders of the defined types were placed in this encounter.     Orders Placed This Encounter   Procedures    FIT-DNA (Cologuard)     Standing Status:   Future     Standing Expiration Date:   3/7/2023    Lipid, Fasting     Standing Status:   Future     Standing Expiration Date:   3/7/2023     DIABETES FOOT EXAM       Electronically signed by Jarrod Nieto MD on 3/12/2022 at 12:49 AM

## 2022-03-08 NOTE — PROGRESS NOTES
Visit Information    Have you changed or started any medications since your last visit including any over-the-counter medicines, vitamins, or herbal medicines? no   Are you having any side effects from any of your medications? -  no  Have you stopped taking any of your medications? Is so, why? -  no    Have you seen any other physician or provider since your last visit? Yes - Records Obtained  Have you had any other diagnostic tests since your last visit? No  Have you been seen in the emergency room and/or had an admission to a hospital since we last saw you? No  Have you had your routine dental cleaning in the past 6 months? yes -     Have you activated your [employer] account? If not, what are your barriers?  Yes     Patient Care Team:  Hans Saul MD as PCP - General (Internal Medicine)  Hans Saul MD as PCP - [facility]  Deborah Jara MD as Consulting Physician (Gastroenterology)    Medical History Review  Past Medical, Family, and Social History reviewed and does contribute to the patient presenting condition    Health Maintenance   Topic Date Due    Diabetic foot exam  Never done    Diabetic retinal exam  Never done    DTaP/Tdap/Td vaccine (1 - Tdap) Never done    Shingles Vaccine (1 of 2) Never done    Flu vaccine (1) 09/01/2021    COVID-19 Vaccine (3 - Booster for [employer] series) 09/08/2021    Annual Wellness Visit (AWV)  12/04/2021    Lipid screen  03/12/2022    Depression Monitoring  04/22/2022    A1C test (Diabetic or Prediabetic)  02/01/2023    Potassium monitoring  02/16/2023    Creatinine monitoring  02/16/2023    Breast cancer screen  05/06/2023    Colorectal Cancer Screen  10/07/2026    DEXA (modify frequency per FRAX score)  Completed    Pneumococcal 65+ years Vaccine  Completed    Hepatitis C screen  Completed    Hepatitis A vaccine  Aged Out    Hib vaccine  Aged Out    Meningococcal (ACWY) vaccine  Aged Out

## 2022-03-08 NOTE — ASSESSMENT & PLAN NOTE
Continue Eliquis  Continue Meds  Follow with Cardiology  Strongly advised to use walker and avoids fall

## 2022-03-10 DIAGNOSIS — I10 ESSENTIAL HYPERTENSION: ICD-10-CM

## 2022-03-10 RX ORDER — AMLODIPINE BESYLATE 10 MG/1
10 TABLET ORAL DAILY
Qty: 90 TABLET | Refills: 2 | Status: ON HOLD | OUTPATIENT
Start: 2022-03-10 | End: 2022-04-07 | Stop reason: SDUPTHER

## 2022-03-11 ENCOUNTER — HOSPITAL ENCOUNTER (OUTPATIENT)
Dept: MRI IMAGING | Facility: CLINIC | Age: 72
Discharge: HOME | End: 2022-03-13
Payer: MEDICARE

## 2022-03-11 DIAGNOSIS — R26.81 GAIT INSTABILITY: ICD-10-CM

## 2022-03-11 DIAGNOSIS — R27.0 ATAXIA: ICD-10-CM

## 2022-03-11 PROCEDURE — 72146 MRI CHEST SPINE W/O DYE: CPT

## 2022-03-14 ASSESSMENT — ENCOUNTER SYMPTOMS
CONSTIPATION: 0
SORE THROAT: 0
VOMITING: 0
SHORTNESS OF BREATH: 0
ABDOMINAL PAIN: 0
CHEST TIGHTNESS: 0
COLOR CHANGE: 0
COUGH: 0
DIARRHEA: 0
TROUBLE SWALLOWING: 0
NAUSEA: 0
WHEEZING: 0
RHINORRHEA: 0

## 2022-03-16 ENCOUNTER — APPOINTMENT (OUTPATIENT)
Dept: CT IMAGING | Age: 72
End: 2022-03-16
Payer: MEDICARE

## 2022-03-16 ENCOUNTER — HOSPITAL ENCOUNTER (OUTPATIENT)
Age: 72
Setting detail: OBSERVATION
Discharge: NURSING, REHAB OR LONG-TERM CARE | End: 2022-03-23
Attending: EMERGENCY MEDICINE | Admitting: INTERNAL MEDICINE
Payer: MEDICARE

## 2022-03-16 DIAGNOSIS — R29.6 FREQUENT FALLS: Primary | ICD-10-CM

## 2022-03-16 LAB
ABSOLUTE EOS #: 0.7 K/UL (ref 0–0.4)
ABSOLUTE LYMPH #: 1.9 K/UL (ref 1–4.8)
ABSOLUTE MONO #: 0.7 K/UL (ref 0.1–1.3)
ALBUMIN SERPL-MCNC: 4.1 G/DL (ref 3.5–5.2)
ALP BLD-CCNC: 44 U/L (ref 35–104)
ALT SERPL-CCNC: 15 U/L (ref 5–33)
ANION GAP SERPL CALCULATED.3IONS-SCNC: 12 MMOL/L (ref 9–17)
AST SERPL-CCNC: 18 U/L
BASOPHILS # BLD: 1 % (ref 0–2)
BASOPHILS ABSOLUTE: 0.1 K/UL (ref 0–0.2)
BILIRUB SERPL-MCNC: 0.26 MG/DL (ref 0.3–1.2)
BUN BLDV-MCNC: 31 MG/DL (ref 8–23)
CALCIUM SERPL-MCNC: 8.9 MG/DL (ref 8.6–10.4)
CHLORIDE BLD-SCNC: 107 MMOL/L (ref 98–107)
CO2: 22 MMOL/L (ref 20–31)
CREAT SERPL-MCNC: 0.87 MG/DL (ref 0.5–0.9)
EOSINOPHILS RELATIVE PERCENT: 9 % (ref 0–4)
GFR AFRICAN AMERICAN: >60 ML/MIN
GFR NON-AFRICAN AMERICAN: >60 ML/MIN
GFR SERPL CREATININE-BSD FRML MDRD: ABNORMAL ML/MIN/{1.73_M2}
GLUCOSE BLD-MCNC: 96 MG/DL (ref 70–99)
HCT VFR BLD CALC: 33.1 % (ref 36–46)
HEMOGLOBIN: 10.9 G/DL (ref 12–16)
LYMPHOCYTES # BLD: 24 % (ref 24–44)
MCH RBC QN AUTO: 31.1 PG (ref 26–34)
MCHC RBC AUTO-ENTMCNC: 33 G/DL (ref 31–37)
MCV RBC AUTO: 94.1 FL (ref 80–100)
MONOCYTES # BLD: 9 % (ref 1–7)
PDW BLD-RTO: 13.9 % (ref 11.5–14.9)
PLATELET # BLD: 325 K/UL (ref 150–450)
PMV BLD AUTO: 6.7 FL (ref 6–12)
POTASSIUM SERPL-SCNC: 3.8 MMOL/L (ref 3.7–5.3)
RBC # BLD: 3.52 M/UL (ref 4–5.2)
SEG NEUTROPHILS: 57 % (ref 36–66)
SEGMENTED NEUTROPHILS ABSOLUTE COUNT: 4.6 K/UL (ref 1.3–9.1)
SODIUM BLD-SCNC: 141 MMOL/L (ref 135–144)
TOTAL PROTEIN: 6.5 G/DL (ref 6.4–8.3)
WBC # BLD: 7.9 K/UL (ref 3.5–11)

## 2022-03-16 PROCEDURE — 6370000000 HC RX 637 (ALT 250 FOR IP): Performed by: EMERGENCY MEDICINE

## 2022-03-16 PROCEDURE — 85025 COMPLETE CBC W/AUTO DIFF WBC: CPT

## 2022-03-16 PROCEDURE — 99283 EMERGENCY DEPT VISIT LOW MDM: CPT

## 2022-03-16 PROCEDURE — 36415 COLL VENOUS BLD VENIPUNCTURE: CPT

## 2022-03-16 PROCEDURE — 71260 CT THORAX DX C+: CPT

## 2022-03-16 PROCEDURE — 2580000003 HC RX 258: Performed by: EMERGENCY MEDICINE

## 2022-03-16 PROCEDURE — 6360000004 HC RX CONTRAST MEDICATION: Performed by: EMERGENCY MEDICINE

## 2022-03-16 PROCEDURE — G0378 HOSPITAL OBSERVATION PER HR: HCPCS

## 2022-03-16 PROCEDURE — 72125 CT NECK SPINE W/O DYE: CPT

## 2022-03-16 PROCEDURE — 80053 COMPREHEN METABOLIC PANEL: CPT

## 2022-03-16 PROCEDURE — 99220 PR INITIAL OBSERVATION CARE/DAY 70 MINUTES: CPT | Performed by: INTERNAL MEDICINE

## 2022-03-16 PROCEDURE — 70450 CT HEAD/BRAIN W/O DYE: CPT

## 2022-03-16 RX ORDER — 0.9 % SODIUM CHLORIDE 0.9 %
80 INTRAVENOUS SOLUTION INTRAVENOUS ONCE
Status: COMPLETED | OUTPATIENT
Start: 2022-03-16 | End: 2022-03-16

## 2022-03-16 RX ORDER — OXYCODONE HYDROCHLORIDE AND ACETAMINOPHEN 5; 325 MG/1; MG/1
1 TABLET ORAL ONCE
Status: COMPLETED | OUTPATIENT
Start: 2022-03-16 | End: 2022-03-16

## 2022-03-16 RX ORDER — SODIUM CHLORIDE 0.9 % (FLUSH) 0.9 %
10 SYRINGE (ML) INJECTION PRN
Status: DISCONTINUED | OUTPATIENT
Start: 2022-03-16 | End: 2022-03-23 | Stop reason: HOSPADM

## 2022-03-16 RX ADMIN — SODIUM CHLORIDE 80 ML: 9 INJECTION, SOLUTION INTRAVENOUS at 21:41

## 2022-03-16 RX ADMIN — IOPAMIDOL 75 ML: 755 INJECTION, SOLUTION INTRAVENOUS at 21:41

## 2022-03-16 RX ADMIN — SODIUM CHLORIDE, PRESERVATIVE FREE 10 ML: 5 INJECTION INTRAVENOUS at 21:41

## 2022-03-16 RX ADMIN — OXYCODONE HYDROCHLORIDE AND ACETAMINOPHEN 1 TABLET: 5; 325 TABLET ORAL at 21:20

## 2022-03-16 ASSESSMENT — ENCOUNTER SYMPTOMS
COLOR CHANGE: 0
EYE REDNESS: 0
EYE PAIN: 0
CHEST TIGHTNESS: 0
ABDOMINAL PAIN: 0
SHORTNESS OF BREATH: 1
BLOOD IN STOOL: 0
SINUS PRESSURE: 0
WHEEZING: 0
BACK PAIN: 0
RHINORRHEA: 0
TROUBLE SWALLOWING: 0
DIARRHEA: 0
NAUSEA: 0
CONSTIPATION: 0
VOMITING: 0
SORE THROAT: 0
FACIAL SWELLING: 0
EYE DISCHARGE: 0
COUGH: 0

## 2022-03-16 ASSESSMENT — PAIN - FUNCTIONAL ASSESSMENT: PAIN_FUNCTIONAL_ASSESSMENT: 0-10

## 2022-03-16 ASSESSMENT — PAIN SCALES - GENERAL
PAINLEVEL_OUTOF10: 6
PAINLEVEL_OUTOF10: 6

## 2022-03-17 PROBLEM — G95.9 CERVICAL MYELOPATHY (HCC): Status: ACTIVE | Noted: 2022-03-17

## 2022-03-17 PROCEDURE — 2580000003 HC RX 258: Performed by: NURSE PRACTITIONER

## 2022-03-17 PROCEDURE — 97530 THERAPEUTIC ACTIVITIES: CPT

## 2022-03-17 PROCEDURE — 97166 OT EVAL MOD COMPLEX 45 MIN: CPT

## 2022-03-17 PROCEDURE — 93005 ELECTROCARDIOGRAM TRACING: CPT | Performed by: INTERNAL MEDICINE

## 2022-03-17 PROCEDURE — 6360000002 HC RX W HCPCS: Performed by: NURSE PRACTITIONER

## 2022-03-17 PROCEDURE — 96376 TX/PRO/DX INJ SAME DRUG ADON: CPT

## 2022-03-17 PROCEDURE — 6370000000 HC RX 637 (ALT 250 FOR IP): Performed by: NURSE PRACTITIONER

## 2022-03-17 PROCEDURE — 96374 THER/PROPH/DIAG INJ IV PUSH: CPT

## 2022-03-17 PROCEDURE — 6370000000 HC RX 637 (ALT 250 FOR IP): Performed by: INTERNAL MEDICINE

## 2022-03-17 PROCEDURE — G0378 HOSPITAL OBSERVATION PER HR: HCPCS

## 2022-03-17 PROCEDURE — 99219 PR INITIAL OBSERVATION CARE/DAY 50 MINUTES: CPT | Performed by: PSYCHIATRY & NEUROLOGY

## 2022-03-17 PROCEDURE — 6370000000 HC RX 637 (ALT 250 FOR IP): Performed by: PSYCHIATRY & NEUROLOGY

## 2022-03-17 PROCEDURE — 97162 PT EVAL MOD COMPLEX 30 MIN: CPT

## 2022-03-17 RX ORDER — GABAPENTIN 100 MG/1
100 CAPSULE ORAL 2 TIMES DAILY
Status: DISCONTINUED | OUTPATIENT
Start: 2022-03-17 | End: 2022-03-23 | Stop reason: HOSPADM

## 2022-03-17 RX ORDER — LISINOPRIL 20 MG/1
30 TABLET ORAL DAILY
Status: DISCONTINUED | OUTPATIENT
Start: 2022-03-17 | End: 2022-03-23 | Stop reason: HOSPADM

## 2022-03-17 RX ORDER — ACETAMINOPHEN 325 MG/1
650 TABLET ORAL EVERY 6 HOURS PRN
Status: DISCONTINUED | OUTPATIENT
Start: 2022-03-17 | End: 2022-03-23 | Stop reason: HOSPADM

## 2022-03-17 RX ORDER — CEPHALEXIN 500 MG/1
500 CAPSULE ORAL EVERY 12 HOURS SCHEDULED
Status: DISCONTINUED | OUTPATIENT
Start: 2022-03-17 | End: 2022-03-23 | Stop reason: HOSPADM

## 2022-03-17 RX ORDER — SODIUM CHLORIDE 0.9 % (FLUSH) 0.9 %
10 SYRINGE (ML) INJECTION PRN
Status: DISCONTINUED | OUTPATIENT
Start: 2022-03-17 | End: 2022-03-23 | Stop reason: HOSPADM

## 2022-03-17 RX ORDER — FERROUS SULFATE 325(65) MG
325 TABLET ORAL 2 TIMES DAILY
Status: DISCONTINUED | OUTPATIENT
Start: 2022-03-17 | End: 2022-03-23 | Stop reason: HOSPADM

## 2022-03-17 RX ORDER — SODIUM CHLORIDE 9 MG/ML
25 INJECTION, SOLUTION INTRAVENOUS PRN
Status: DISCONTINUED | OUTPATIENT
Start: 2022-03-17 | End: 2022-03-23 | Stop reason: HOSPADM

## 2022-03-17 RX ORDER — ACETAMINOPHEN 650 MG/1
650 SUPPOSITORY RECTAL EVERY 6 HOURS PRN
Status: DISCONTINUED | OUTPATIENT
Start: 2022-03-17 | End: 2022-03-23 | Stop reason: HOSPADM

## 2022-03-17 RX ORDER — FENOFIBRATE 160 MG/1
160 TABLET ORAL DAILY
Status: DISCONTINUED | OUTPATIENT
Start: 2022-03-17 | End: 2022-03-23 | Stop reason: HOSPADM

## 2022-03-17 RX ORDER — CARVEDILOL 12.5 MG/1
12.5 TABLET ORAL 2 TIMES DAILY
Status: DISCONTINUED | OUTPATIENT
Start: 2022-03-17 | End: 2022-03-23 | Stop reason: HOSPADM

## 2022-03-17 RX ORDER — DOCUSATE SODIUM 100 MG/1
100 CAPSULE, LIQUID FILLED ORAL 2 TIMES DAILY PRN
Status: DISCONTINUED | OUTPATIENT
Start: 2022-03-17 | End: 2022-03-23 | Stop reason: HOSPADM

## 2022-03-17 RX ORDER — SODIUM CHLORIDE 0.9 % (FLUSH) 0.9 %
5-40 SYRINGE (ML) INJECTION EVERY 12 HOURS SCHEDULED
Status: DISCONTINUED | OUTPATIENT
Start: 2022-03-17 | End: 2022-03-23 | Stop reason: HOSPADM

## 2022-03-17 RX ORDER — LANOLIN ALCOHOL/MO/W.PET/CERES
1000 CREAM (GRAM) TOPICAL DAILY
Status: DISCONTINUED | OUTPATIENT
Start: 2022-03-17 | End: 2022-03-23 | Stop reason: HOSPADM

## 2022-03-17 RX ORDER — ONDANSETRON 4 MG/1
4 TABLET, ORALLY DISINTEGRATING ORAL EVERY 8 HOURS PRN
Status: DISCONTINUED | OUTPATIENT
Start: 2022-03-17 | End: 2022-03-23 | Stop reason: HOSPADM

## 2022-03-17 RX ORDER — FUROSEMIDE 10 MG/ML
40 INJECTION INTRAMUSCULAR; INTRAVENOUS 2 TIMES DAILY
Status: DISCONTINUED | OUTPATIENT
Start: 2022-03-17 | End: 2022-03-21

## 2022-03-17 RX ORDER — ATORVASTATIN CALCIUM 40 MG/1
40 TABLET, FILM COATED ORAL NIGHTLY
Status: DISCONTINUED | OUTPATIENT
Start: 2022-03-17 | End: 2022-03-23 | Stop reason: HOSPADM

## 2022-03-17 RX ORDER — PRAMIPEXOLE DIHYDROCHLORIDE 0.25 MG/1
0.5 TABLET ORAL NIGHTLY
Status: DISCONTINUED | OUTPATIENT
Start: 2022-03-17 | End: 2022-03-23 | Stop reason: HOSPADM

## 2022-03-17 RX ORDER — ONDANSETRON 2 MG/ML
4 INJECTION INTRAMUSCULAR; INTRAVENOUS EVERY 6 HOURS PRN
Status: DISCONTINUED | OUTPATIENT
Start: 2022-03-17 | End: 2022-03-23 | Stop reason: HOSPADM

## 2022-03-17 RX ORDER — CITALOPRAM 20 MG/1
20 TABLET ORAL DAILY
Status: DISCONTINUED | OUTPATIENT
Start: 2022-03-17 | End: 2022-03-23 | Stop reason: HOSPADM

## 2022-03-17 RX ORDER — AMLODIPINE BESYLATE 5 MG/1
10 TABLET ORAL DAILY
Status: DISCONTINUED | OUTPATIENT
Start: 2022-03-17 | End: 2022-03-23 | Stop reason: HOSPADM

## 2022-03-17 RX ADMIN — FUROSEMIDE 40 MG: 10 INJECTION, SOLUTION INTRAMUSCULAR; INTRAVENOUS at 01:09

## 2022-03-17 RX ADMIN — AMLODIPINE BESYLATE 10 MG: 5 TABLET ORAL at 08:52

## 2022-03-17 RX ADMIN — CARVEDILOL 12.5 MG: 12.5 TABLET, FILM COATED ORAL at 20:41

## 2022-03-17 RX ADMIN — APIXABAN 5 MG: 5 TABLET, FILM COATED ORAL at 20:41

## 2022-03-17 RX ADMIN — PRAMIPEXOLE DIHYDROCHLORIDE 0.5 MG: 0.25 TABLET ORAL at 01:20

## 2022-03-17 RX ADMIN — SODIUM CHLORIDE, PRESERVATIVE FREE 10 ML: 5 INJECTION INTRAVENOUS at 09:01

## 2022-03-17 RX ADMIN — FERROUS SULFATE TAB 325 MG (65 MG ELEMENTAL FE) 325 MG: 325 (65 FE) TAB at 20:41

## 2022-03-17 RX ADMIN — ATORVASTATIN CALCIUM 40 MG: 40 TABLET, FILM COATED ORAL at 01:06

## 2022-03-17 RX ADMIN — APIXABAN 5 MG: 5 TABLET, FILM COATED ORAL at 08:52

## 2022-03-17 RX ADMIN — CALCIUM CARBONATE 600 MG (1,500 MG)-VITAMIN D3 400 UNIT TABLET 1 TABLET: at 08:52

## 2022-03-17 RX ADMIN — FERROUS SULFATE TAB 325 MG (65 MG ELEMENTAL FE) 325 MG: 325 (65 FE) TAB at 01:06

## 2022-03-17 RX ADMIN — ACETAMINOPHEN 650 MG: 325 TABLET, FILM COATED ORAL at 08:52

## 2022-03-17 RX ADMIN — LISINOPRIL 30 MG: 20 TABLET ORAL at 08:52

## 2022-03-17 RX ADMIN — METRONIDAZOLE 100 MG: 500 TABLET ORAL at 20:41

## 2022-03-17 RX ADMIN — ACETAMINOPHEN 650 MG: 325 TABLET, FILM COATED ORAL at 01:05

## 2022-03-17 RX ADMIN — FUROSEMIDE 40 MG: 10 INJECTION, SOLUTION INTRAMUSCULAR; INTRAVENOUS at 17:56

## 2022-03-17 RX ADMIN — FERROUS SULFATE TAB 325 MG (65 MG ELEMENTAL FE) 325 MG: 325 (65 FE) TAB at 08:52

## 2022-03-17 RX ADMIN — PRAMIPEXOLE DIHYDROCHLORIDE 0.5 MG: 0.25 TABLET ORAL at 20:45

## 2022-03-17 RX ADMIN — FENOFIBRATE 160 MG: 160 TABLET ORAL at 08:52

## 2022-03-17 RX ADMIN — CARVEDILOL 12.5 MG: 12.5 TABLET, FILM COATED ORAL at 08:52

## 2022-03-17 RX ADMIN — CYANOCOBALAMIN TAB 1000 MCG 1000 MCG: 1000 TAB at 08:52

## 2022-03-17 RX ADMIN — APIXABAN 5 MG: 5 TABLET, FILM COATED ORAL at 01:06

## 2022-03-17 RX ADMIN — CEPHALEXIN 500 MG: 500 CAPSULE ORAL at 20:45

## 2022-03-17 RX ADMIN — ATORVASTATIN CALCIUM 40 MG: 40 TABLET, FILM COATED ORAL at 20:41

## 2022-03-17 RX ADMIN — CEPHALEXIN 500 MG: 500 CAPSULE ORAL at 14:00

## 2022-03-17 RX ADMIN — CARVEDILOL 12.5 MG: 12.5 TABLET, FILM COATED ORAL at 01:06

## 2022-03-17 RX ADMIN — DOCUSATE SODIUM 100 MG: 100 CAPSULE, LIQUID FILLED ORAL at 01:06

## 2022-03-17 RX ADMIN — FUROSEMIDE 40 MG: 10 INJECTION, SOLUTION INTRAMUSCULAR; INTRAVENOUS at 08:53

## 2022-03-17 RX ADMIN — SODIUM CHLORIDE, PRESERVATIVE FREE 10 ML: 5 INJECTION INTRAVENOUS at 20:42

## 2022-03-17 RX ADMIN — CITALOPRAM HYDROBROMIDE 20 MG: 20 TABLET ORAL at 08:52

## 2022-03-17 ASSESSMENT — ENCOUNTER SYMPTOMS
DIARRHEA: 0
CONSTIPATION: 0
NAUSEA: 0
VOMITING: 0
WHEEZING: 0
SHORTNESS OF BREATH: 0
ABDOMINAL PAIN: 0
BACK PAIN: 0
SORE THROAT: 0
COUGH: 0

## 2022-03-17 ASSESSMENT — PAIN SCALES - GENERAL
PAINLEVEL_OUTOF10: 2
PAINLEVEL_OUTOF10: 5
PAINLEVEL_OUTOF10: 7
PAINLEVEL_OUTOF10: 2
PAINLEVEL_OUTOF10: 0

## 2022-03-17 ASSESSMENT — PAIN DESCRIPTION - PROGRESSION
CLINICAL_PROGRESSION: NOT CHANGED
CLINICAL_PROGRESSION: NOT CHANGED

## 2022-03-17 ASSESSMENT — PAIN DESCRIPTION - FREQUENCY
FREQUENCY: INTERMITTENT
FREQUENCY: INTERMITTENT

## 2022-03-17 ASSESSMENT — PAIN DESCRIPTION - ONSET: ONSET: ON-GOING

## 2022-03-17 ASSESSMENT — PAIN - FUNCTIONAL ASSESSMENT: PAIN_FUNCTIONAL_ASSESSMENT: ACTIVITIES ARE NOT PREVENTED

## 2022-03-17 ASSESSMENT — PAIN DESCRIPTION - DESCRIPTORS
DESCRIPTORS: BURNING
DESCRIPTORS: BURNING

## 2022-03-17 ASSESSMENT — PAIN DESCRIPTION - LOCATION
LOCATION: LEG
LOCATION: NECK
LOCATION: LEG

## 2022-03-17 ASSESSMENT — PAIN DESCRIPTION - ORIENTATION
ORIENTATION: RIGHT
ORIENTATION: RIGHT

## 2022-03-17 ASSESSMENT — PAIN DESCRIPTION - PAIN TYPE
TYPE: ACUTE PAIN
TYPE: CHRONIC PAIN
TYPE: CHRONIC PAIN

## 2022-03-17 NOTE — ED NOTES
Report given to Salena Cherry RN from ED   Report method by phone   The following was reviewed with receiving RN:   Current vital signs:  /73   Pulse 82   Temp 98 °F (36.7 °C) (Oral)   Resp 17   SpO2 96%                MEWS Score: 1     Any medication or safety alerts were reviewed. Any pending diagnostics and notifications were also reviewed, as well as any safety concerns or issues, abnormal labs, abnormal imaging, and abnormal assessment findings. Questions were answered.           Edis Mooney RN  03/17/22 [id number]

## 2022-03-17 NOTE — CONSULTS
70 yo lady with falling . She present yesterday to [facility] with falling having had 2 falls this week with one of these landing on left neck  . She has history of cervical myelopathy seen neurosurgery KENDAL Rees having had prior C3-6 posterior fusion with laminectomies last year  . MRI cervical spine fron January 2022 posterior fixation C3 to C6 with multi level degenerative changes with no canal stenosis . She reports that for one year even before neck surgery she has had mild eft arm weakness dropping things with left hand along with left leg weakness with leg giveway along with left foot going out  . She has gait imbalance walking with walker with falling that can be as frequent as once per week . There is bilateral leg edema . There has been neck pain . MRI thoracic spine from March with normal cord with moderate to severe bilateral T2-T3 neural foraminal stenosis . Mild to moderate right T5-6 and T6-7  neural foraminal stenosis . Head CT this admission with mild chronic periventricular small vessel disease . CT cervical spine posterior metallic fusion X2-K7 with laminectomies. She is on eliquis 5 mg po bid  for DVT . She did walk 25 feet with rolling walker today with slow tawanda . Significant medications eliquis 5 mg po bid . Testing . MRI cervical spine fron January posterior fixation C3 to C6 with multi level degenerative changes with no canal stenosis, January 2022 . MRI thoracic spine from March with normal cord with moderate to severe bilateral T2-T3 neural foraminal stenosis . Mild to moderate right T5-6 and T6-7  neural foarminal stenosis , march 2022 Head CT this admission with mild chronic periventricular small vessel disease .  CT cervical spine posterior metallic fusion T7-U3 with laminectomies     Past Medical History:   Diagnosis Date    Allergic rhinitis, cause unspecified     Back pain     lumbar    Bowel obstruction (HCC)     history of due to scar tissue, resolved non-surgically    C. difficile diarrhea     CAD (coronary artery disease)     no stent needed per pt.  Dr. Jena Castaneda did cath at [facility]    Cardiac murmur     Cellulitis     left leg    Cellulitis 2017 August    leg left leg/bug bite    Cerebral artery occlusion with cerebral infarction [facility])     TIA 2014    COVID-19     ONE YR AGO IN 4/25/2020 fever and cough    Diverticulosis of colon (without mention of hemorrhage)     GERD (gastroesophageal reflux disease)     GERD (gastroesophageal reflux disease)     on rx    History of blood transfusion     approx 2020        History of CHF (congestive heart failure)     History of MI (myocardial infarction) 2005    thought due to a blood clot    History of ovarian cyst 1970    had oopherectomy holly    History of peritonitis 1968    due to ruptured appendix age 13    HTN (hypertension)     Hx of blood clots     right leg    Hyperlipidemia     Intestinal or peritoneal adhesions with obstruction (postoperative) (postinfection) ([address]     Kidney infection     renal failure/sepsis/spider bite    Lateral epicondylitis  of elbow     MDRO (multiple drug resistant organisms) resistance     c diff    Muscle strain     right posterior shoulder    Other abnormal glucose     PONV (postoperative nausea and vomiting)     dry heaves    Pre-diabetes     Restless legs syndrome (RLS)     Snores     no cpap    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)     TIA (transient ischemic attack) 2014    Uses walker     Vitamin D deficiency     Wears glasses     Wellness examination     last seen 2 weeks ago       Past Surgical History:   Procedure Laterality Date    ABDOMEN SURGERY  1976    benign tumor removed near remaining ovary, 1.5 pounds    APPENDECTOMY  1968    appendix ruptured, developed peritonitis    BACK SURGERY      BUNIONECTOMY Left     along with calcium deposits removed   [address]  2005    negative    CERVICAL FUSION 05/21/2021    POSTERIOR C3-6 LAMINECTOMY, PARTIAL C7 LAMINECTOMY, FUSION C3-C6, SILVERCORD    CERVICAL FUSION N/A 5/21/2021    POSTERIOR C3-6 LAMINECTOMY, PARTIAL C7 LAMINECTOMY, FUSION C3-C6, SILVERCORD performed by Tianna Dixon DO at [facility] D/T OBSTRUCTION    COLONOSCOPY      CYST REMOVAL Right     right facial    HYSTERECTOMY  1973    taken as a result of recurring cysts    LUMBAR FUSION N/A 02/10/2020    LUMBAR L4-5 POSTERIOR  DECOMPRESSION INSTRUMENTATION FUSION WCEMENT AUGMENTATION/ performed by Jamison Morgan MD at [facility] N/A 06/17/2020    L5-S1 PLIF L4-L5 REVISION performed by Jamison Morgan MD at [address] [place] [address]  08/14/2014    FESS    OVARY REMOVAL  1970    UNILATERAL due to cyst    OVARY REMOVAL  1971    partial, due to cyst   Steve Layer SINUS SURGERY  2004    UPPER GASTROINTESTINAL ENDOSCOPY N/A 05/31/2019    EGD ESOPHAGOGASTRODUODENOSCOPY performed by Fransisca Scruggs MD at [address] N/A 08/05/2019    EGD BIOPSY performed by Joselin Schmitz MD at [address] N/A 08/23/2019    EGD BIOPSY performed by Fransisca Scruggs MD at [facility] 03/05/2019    WRIST OPEN REDUCTION INTERNAL FIXATION performed by Oscar Saucedo MD at [place] History   Problem Relation Age of Onset    Stroke Mother     Diabetes Mother     Heart Disease Mother     High Blood Pressure Mother     Heart Disease Father     Heart Disease Brother     High Blood Pressure Brother     Heart Disease Maternal Grandmother     High Blood Pressure Sister        Social History     Socioeconomic History    Marital status:      Spouse name: None    Number of children: None    Years of education: None    Highest education level: None   Occupational History    Occupation: retired   Tobacco Use    Smoking status: Former Smoker     Packs/day: 0.50     Years: 20.00     Pack years: 10.00     Types: Cigarettes     Start date: 1995     Quit date: 2017     Years since quittin.7    Smokeless tobacco: Never Used   Vaping Use    Vaping Use: Never used   Substance and Sexual Activity    Alcohol use: No     Alcohol/week: 0.0 standard drinks    Drug use: No    Sexual activity: None   Other Topics Concern    None   Social History Narrative    None     Social Determinants of Health     Financial Resource Strain: Low Risk     Difficulty of Paying Living Expenses: Not hard at all   Food Insecurity: No Food Insecurity    Worried About Running Out of Food in the Last Year: Never true    Raghavendra of Food in the Last Year: Never true   Transportation Needs:     Lack of Transportation (Medical): Not on file    Lack of Transportation (Non-Medical):  Not on file   Physical Activity:     Days of Exercise per Week: Not on file    Minutes of Exercise per Session: Not on file   Stress:     Feeling of Stress : Not on file   Social Connections:     Frequency of Communication with Friends and Family: Not on file    Frequency of Social Gatherings with Friends and Family: Not on file    Attends Hinduism Services: Not on file    Active Member of [address] [place] [address] [employer] or Organizations: Not on file    Attends Club or Organization Meetings: Not on file    Marital Status: Not on file   Intimate Partner Violence:     Fear of Current or Ex-Partner: Not on file    Emotionally Abused: Not on file    Physically Abused: Not on file    Sexually Abused: Not on file   Housing Stability:     Unable to Pay for Housing in the Last Year: Not on file    Number of [place] in the Last Year: Not on file    Unstable Housing in the Last Year: Not on file       Current Facility-Administered Medications   Medication Dose Route Frequency Provider Last Rate Last Admin    sodium chloride flush 0.9 % injection 5-40 mL  5-40 mL IntraVENous 2 times per day Earmon Led, APRN - CNP   10 mL at 03/17/22 0901    sodium chloride flush 0.9 % injection 10 mL  10 mL IntraVENous PRN Earmon Led, APRN - CNP        0.9 % sodium chloride infusion  25 mL IntraVENous PRN Earmon Led, APRN - CNP        ondansetron (ZOFRAN-ODT) disintegrating tablet 4 mg  4 mg Oral Q8H PRN Earmon Led, APRN - CNP        Or    ondansetron [facility]) injection 4 mg  4 mg IntraVENous Q6H PRN Earmon Led, APRN - CNP        magnesium hydroxide (MILK OF MAGNESIA) 400 MG/5ML suspension 30 mL  30 mL Oral Daily PRN Earmon Led, APRN - CNP        acetaminophen (TYLENOL) tablet 650 mg  650 mg Oral Q6H PRN Earmon Led, APRN - CNP   650 mg at 03/17/22 6334    Or    acetaminophen (TYLENOL) suppository 650 mg  650 mg Rectal Q6H PRN Earmon Led, APRN - CNP        amLODIPine (NORVASC) tablet 10 mg  10 mg Oral Daily Earmon Led, APRN - CNP   10 mg at 03/17/22 9281    apixaban (ELIQUIS) tablet 5 mg  5 mg Oral BID Earmon Led, APRN - CNP   5 mg at 03/17/22 4013    atorvastatin (LIPITOR) tablet 40 mg  40 mg Oral Nightly Earmon Led, APRN - CNP   40 mg at 03/17/22 0106    carvedilol (COREG) tablet 12.5 mg  12.5 mg Oral BID Earmon Led, APRN - CNP   12.5 mg at 03/17/22 3381    citalopram (CELEXA) tablet 20 mg  20 mg Oral Daily Earmon Led, APRN - CNP   20 mg at 03/17/22 3608    vitamin B-12 (CYANOCOBALAMIN) tablet 1,000 mcg  1,000 mcg Oral Daily Earmon Led, APRN - CNP   1,000 mcg at 03/17/22 3332    docusate sodium (COLACE) capsule 100 mg  100 mg Oral BID PRN Earmon Led, APRN - CNP   100 mg at 03/17/22 0106    fenofibrate (TRIGLIDE) tablet 160 mg  160 mg Oral Daily Earmon Led, APRN - CNP   160 mg at 03/17/22 5490    ferrous sulfate (IRON 325) tablet 325 mg  325 mg Oral BID Earmon Led, APRN - CNP   325 mg at 03/17/22 1434    lisinopril (PRINIVIL;ZESTRIL) tablet 30 mg  30 mg Oral Daily Earmon Led, APRN - CNP   30 mg at 03/17/22 0852    pramipexole (MIRAPEX) tablet 0.5 mg  0.5 mg Oral Nightly Marline Branham, APRN - CNP   0.5 mg at 03/17/22 0120    furosemide (LASIX) injection 40 mg  40 mg IntraVENous BID Marline Branham, APRN - CNP   40 mg at 03/17/22 5178    calcium carbonate w/vitamin D (CALTRATE) 600-400 MG-UNIT per tab 1 tablet  1 tablet Oral Daily Marline Branham, APRN - CNP   1 tablet at 03/17/22 7317    cephALEXin (KEFLEX) capsule 500 mg  500 mg Oral 2 times per day Jason Duong MD   500 mg at 03/17/22 1400    sodium chloride flush 0.9 % injection 10 mL  10 mL IntraVENous PRN Sohail Pineda MD   10 mL at 03/16/22 2141       Allergies   Allergen Reactions    Bactrim [Sulfamethoxazole-Trimethoprim] Other (See Comments)     sepsis    Codeine Itching    Seasonal        ROS:   Constitutional                  Negative for fever and chills   HEENT                            Negative for ear discharge, ear pain, nosebleed  Eyes                                Negative for photophobia, pain and discharge  Respiratory                      Negative for hemoptysis and sputum  Cardiovascular                Negative for orthopnea, claudication and PND  Gastrointestinal               Negative for abdominal pain, diarrhea, blood in stool  Musculoskeletal               Negative for joint pain, negative for myalgia  Skin                                 Negative for rash or itching  Endo/heme/allergies       Negative for polydipsia, environmental allergy  Psychiatric                       Negative for suicidal ideation. Patient is not anxious    Vitals:    03/17/22 1337   BP: (!) 128/50   Pulse: 70   Resp: 16   Temp: 98.4 °F (36.9 °C)   SpO2: 95%     Admission weight: 190 lb 7.6 oz (86.4 kg)    Neurological Examination  Constitutional .General exam well groomed   Head/ Ears /Nose/Throat/external ear . Normal exam  Neck and thyroid . Normal size. No bruits  Respiratory . Breathsounds clear bilaterally  Cardiovascular:  Auscultation of heart with regular rate and rhythm   Musculoskeletal. Muscle bulk and tone normal                                                           Muscle strength left ADF and EHL 4+/5 otherwise 5/5 strength throughout                                                                                No dysmetria or dysdiadokinesis  No tremor   Normal fine motor  Orientation Alert and oriented x 3   Attention and concentration normal  Short term memory normal  Language process and speech normal . No aphasia   Cranial nerve 2 normal acuety and visual fields  Cranial nerve 3, 4 and 6 . Extraocular muscles are intact . Pupils are equal and reactive   Cranial nerve 5 . Intact corneal reflex. Normal facial sensation  Cranial nerve 7 normal exam   Cranial nerve 8. Grossly intact hearing   Cranial nerve 9 and 10. Symmetric palate elevation   Cranial nerve 11 , 5 out of 5 strength   Cranial Nerve 12 midline tongue . No atrophy  Sensation . Normal pinprick and light touch   Deep Tendon Reflexes normal  Plantar response flexor bilaterally    Assessment :    Frequent falling episodes . Cervical myelopathy status post decompression with residual left side weakness . Rule out lumbar stenosis    Plan:    MRI lumbar spine . PT/OT . Orthostatic blood pressures . PT/OT . Neurontin 100 mg po bid .  Will need rehabilitation

## 2022-03-17 NOTE — PROGRESS NOTES
[address]   Occupational Therapy Evaluation  Date: 3/17/22  Patient Name: Gail Joyner       Room: 0177/0377-65  MRN: [id number]  Account: [de-identified]   : 1950  (71 y.o.) Gender: female     Discharge Recommendations: This patient would benefit from a Physical Medicine and Rehab Consult. Further Occupational Therapy is recommended upon facility discharge. Equipment Needed:  (TBD)    Referring Practitioner: Donnetta Ahumada, MD  Diagnosis: Ataxia  Additional Pertinent Hx: 71 y.o.  female, with a history of anemia, spondylolisthesis, chronic DVT, chronic diastolic heart failure, CVA, major depressive disorder, s/p cervical spine fusion, stenosis of cervical spine with myelopathy, and DM type II, who presents with leg pain, shortness of breath, fall, and neck pain. According to patient and her , patient has had multiple falls recently including a fall Monday and evening and again on Tuesday. Patient reports that today she felt extremely weak upon waking.      Treatment Diagnosis: Impaired self-care status  Past Medical History:  has a past medical history of Allergic rhinitis, cause unspecified, Back pain, Bowel obstruction (HCC), C. difficile diarrhea, CAD (coronary artery disease), Cardiac murmur, Cellulitis, Cellulitis, Cerebral artery occlusion with cerebral infarction ([address] COVID-19, Diverticulosis of colon (without mention of hemorrhage), GERD (gastroesophageal reflux disease), GERD (gastroesophageal reflux disease), History of blood transfusion, History of CHF (congestive heart failure), History of MI (myocardial infarction), History of ovarian cyst, History of peritonitis, HTN (hypertension), Hx of blood clots, Hyperlipidemia, Intestinal or peritoneal adhesions with obstruction (postoperative) (postinfection) ([address] Kidney infection, Lateral epicondylitis  of elbow, MDRO (multiple drug resistant organisms) resistance, Muscle strain, reading  Hearing  Hearing: Within functional limits  Social/Functional History  Lives With: Spouse  Type of Home: House  Home Layout: One level,Laundry in basement  Home Access: Stairs to enter without rails  Entrance Stairs - Number of Steps: 1  Bathroom Shower/Tub: Tub/Shower unit,Curtain  Bathroom Toilet: Standard  Bathroom Equipment: Shower chair,Grab bars in shower,Hand-held shower,Toilet raiser,Grab bars around toilet  Bathroom Accessibility: Walker accessible  [place] wheeled walker,Cane,Lift chair  Receives Help From: Family  ADL Assistance: Needs assistance (daughter A with bathing 2x/week, IND dressing/toileting)  Homemaking Assistance: Needs assistance (daughter does laundry, pt helps with meals)  Homemaking Responsibilities: Yes ( grocery shops)  Ambulation Assistance: Independent (uses RW)  Transfer Assistance: Needs assistance ( lift chair, A with stairs/transfers to shower)  Active : No  Patient's  Info: family  Occupation: Retired  Type of occupation: hairdresser  IADL Comments: sleeps in lift chair  Additional Comments:  is retired and assists prn - hx of CABG. Daughter works full time - assists with pt 2x/week on medications, laundry, etc. Lives close by. Pain Assessment  Pain Assessment: 0-10  Pain Level: 2  Pain Type: Chronic pain  Pain Location: Leg ( thigh)  Pain Orientation: Right  Pain Descriptors: Burning  Pain Frequency: Intermittent (usually when laying down)  Clinical Progression: Not changed  Response to Pain Intervention: Patient Satisfied    Objective      Cognition  Overall Cognitive Status: WFL   Sensation  Overall Sensation Status: Impaired (numbess in R thigh)   ADL  Feeding: Setup  Grooming: Setup  UE Bathing: Contact guard assistance  LE Bathing: Moderate assistance  UE Dressing: Contact guard assistance  LE Dressing: Moderate assistance  Toileting:  Moderate assistance  Additional Comments: ADL scores based on skilled observation and clinical reasoning, unless otherwise noted. Assistance donning B socks. Assistance required due to impaired mobility/balance and fall risk, impacting safety and independence with self care    UE Function           LUE Strength  L Hand General: 3+/5  LUE Strength Comment: Overall 3+/5     LUE Tone: Normotonic     LUE AROM (degrees)  LUE AROM : WFL     Left Hand AROM (degrees)  Left Hand AROM: WFL  RUE Strength  R Hand General: 4-/5  RUE Strength Comment: Overall 4-/5      RUE Tone: Normotonic     RUE AROM (degrees)  RUE AROM : WFL     Right Hand AROM (degrees)  Right Hand AROM: WFL    Fine Motor Skills  Coordination  Movements Are Fluid And Coordinated: No  Coordination and Movement description: Fine motor impairments,Gross motor impairments,Right UE,Left UE                           Mobility  Supine to Sit: Minimal assistance       Balance  Sitting Balance: Stand by assistance  Standing Balance: Minimal assistance  Standing Balance  Time: 1-2 minutes  Activity: functional transfers, functional mobility  Comment: with RW for UE support  Functional Mobility  Functional - Mobility Device: Rolling Walker  Activity:  (Around bed to chair)  Assist Level: Minimal assistance  Functional Mobility Comments: Assist with RW positioning. Quite unsteady, difficulty with LLE over RLE. Bed mobility  Rolling to Left: Minimal assistance  Supine to Sit: Minimal assistance  Scooting: Contact guard assistance  Comment: Head of bed elevated. Cues and assist needed to edge of bed. Pt up in chair end of session. Transfers  Sit to stand: Moderate assistance  Stand to sit: Moderate assistance  Transfer Comments: Min cues for hand placement for safety  Functional Activity Tolerance  Functional Activity Tolerance:  Tolerates 30 min exercise with multiple rests     Assessment  Assessment  Performance deficits / Impairments: Decreased functional mobility ,Decreased ADL status,Decreased strength,Decreased safe awareness,Decreased endurance,Decreased balance,Decreased sensation,Decreased high-level IADLs  Treatment Diagnosis: Impaired self-care status  Prognosis: Good  Decision Making: Medium Complexity  REQUIRES OT FOLLOW UP: Yes  Discharge Recommendations: Patient would benefit from continued therapy after discharge  Activity Tolerance: Patient Tolerated treatment well         Functional Outcome Measures  AM-PAC Daily Activity Inpatient   How much help for putting on and taking off regular lower body clothing?: A Lot  How much help for Bathing?: A Lot  How much help for Toileting?: A Lot  How much help for putting on and taking off regular upper body clothing?: A Little  How much help for taking care of personal grooming?: A Little  How much help for eating meals?: A Little  AM-[facility] Inpatient Daily Activity Raw Score: 15  AM-PAC Inpatient ADL T-Scale Score : 34.69  ADL Inpatient CMS 0-100% Score: 56.46  ADL Inpatient CMS G-Code Modifier : CK       Goals  Short term goals  Time Frame for Short term goals: By discharge  Short term goal 1: Pt will perform BADLs with supervision and Good safety  Short term goal 2: Pt will V/D use of AE/AS to increase independence with LB self care  Short term goal 3: Pt will perform transfers/functional mobility supervision and Good safety  Short term goal 4: Pt will tolerate dynamic standing, 8+ minutes, while engaged in self care/functional activities  Short term goal 5: Pt will actively participate in 15+ minutes of therapeutic exercise/functional activities to increase independence with self care and mobility    Plan  Safety Devices  Safety Devices in place: Yes  Type of devices:  All fall risk precautions in place,Call light within reach,Chair alarm in place,Gait belt,Patient at risk for falls,Left in chair,Nurse notified     Plan  Times per week: 5-7  Current Treatment Recommendations: Balance Training,Functional Mobility Training,Endurance Training,Strengthening,ROM,Safety Education & Training,Patient/Caregiver Education & Training,Equipment Evaluation, Education, & procurement,Self-Care / ADL       Equipment Recommendations  Equipment Needed:  (TBD)  OT Individual Minutes  Time In: 5393  Time Out: 9175  Minutes: 23    Electronically signed by Fady Nieto OT on 3/17/22 at 3:41 PM EDT         03/17/22 1540   OT Individual Minutes   Time In 1351   Time Out 0631   Minutes 23   Time Code Minutes    Timed Code Treatment Minutes 8 Minutes

## 2022-03-17 NOTE — ED PROVIDER NOTES
[address]  eMERGENCY dEPARTMENT eNCOUnter      Pt Name: Fina Agustin  MRN: [id number]  [place] 1950  Date of evaluation: 3/16/22      CHIEF COMPLAINT       Chief Complaint   Patient presents with    Leg Pain    Shortness of Breath    Fall    Neck Pain         HISTORY OF PRESENT ILLNESS    Fina Agustin is a 71 y.o. female who presents complaining of fall. Patient is fallen multiple times but twice this week already. Patient is having pain in her chest with some shortness of breath. Patient states that she also could have twisted her neck one time when she fell and has been having pain mostly to the left side of her neck. Patient denies any new numbness tingling or weakness. Patient states she does not recall hitting her head and did not lose consciousness. Patient is on Eliquis for DVT currently. Patient states that she is still having a lot of pain in her right leg where she has the blood clot and feels like it is getting bigger and still more painful. Patient denies abdominal pain nausea or vomiting. REVIEW OF SYSTEMS       Review of Systems   Constitutional: Negative for activity change, appetite change, chills, diaphoresis and fever. HENT: Negative for congestion, ear pain, facial swelling, nosebleeds, rhinorrhea, sinus pressure, sore throat and trouble swallowing. Eyes: Negative for pain, discharge and redness. Respiratory: Positive for shortness of breath. Negative for cough, chest tightness and wheezing. Cardiovascular: Positive for chest pain. Negative for palpitations and leg swelling. Gastrointestinal: Negative for abdominal pain, blood in stool, constipation, diarrhea, nausea and vomiting. Genitourinary: Negative for difficulty urinating, dysuria, flank pain, frequency, genital sores and hematuria. Musculoskeletal: Positive for neck pain. Negative for arthralgias, back pain, gait problem, joint swelling and myalgias.         Fall   Skin: Negative for color change, pallor, rash and wound. Neurological: Negative for dizziness, tremors, seizures, syncope, speech difficulty, weakness, numbness and headaches. Psychiatric/Behavioral: Negative for confusion, decreased concentration, hallucinations, self-injury, sleep disturbance and suicidal ideas. PAST MEDICAL HISTORY     Past Medical History:   Diagnosis Date    Allergic rhinitis, cause unspecified     Back pain     lumbar    Bowel obstruction (HCC)     history of due to scar tissue, resolved non-surgically    C. difficile diarrhea     CAD (coronary artery disease)     no stent needed per pt.  Dr. Sha Leon did cath at  2005    Cardiac murmur     Cellulitis     left leg    Cellulitis 2017 August    leg left leg/bug bite    Cerebral artery occlusion with cerebral infarction [facility])     TIA 2014    COVID-19     ONE YR AGO IN 4/25/2020 fever and cough    Diverticulosis of colon (without mention of hemorrhage)     GERD (gastroesophageal reflux disease)     GERD (gastroesophageal reflux disease)     on rx    History of blood transfusion     approx 2020        History of CHF (congestive heart failure)     History of MI (myocardial infarction) 2005    thought due to a blood clot    History of ovarian cyst 1970    had oopherectomy holly    History of peritonitis 1968    due to ruptured appendix age 13    HTN (hypertension)     Hx of blood clots     right leg    Hyperlipidemia     Intestinal or peritoneal adhesions with obstruction (postoperative) (postinfection) ([address]     Kidney infection     renal failure/sepsis/spider bite    Lateral epicondylitis  of elbow     MDRO (multiple drug resistant organisms) resistance     c diff    Muscle strain     right posterior shoulder    Other abnormal glucose     PONV (postoperative nausea and vomiting)     dry heaves    Pre-diabetes     Restless legs syndrome (RLS)     Snores     no cpap    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)     TIA (transient ischemic attack) 2014    Uses walker     Vitamin D deficiency     Wears glasses     Wellness examination     last seen 2 weeks ago       SURGICAL HISTORY       Past Surgical History:   Procedure Laterality Date    ABDOMEN SURGERY  1976    benign tumor removed near remaining ovary, 1.5 pounds    APPENDECTOMY  1968    appendix ruptured, developed peritonitis    BACK SURGERY      BUNIONECTOMY Left     along with calcium deposits removed   [address]  2005    negative    CERVICAL FUSION  05/21/2021    POSTERIOR C3-6 LAMINECTOMY, PARTIAL C7 LAMINECTOMY, FUSION C3-C6, SILVERCORD    CERVICAL FUSION N/A 5/21/2021    POSTERIOR C3-6 LAMINECTOMY, PARTIAL C7 LAMINECTOMY, FUSION C3-C6, SILVERCORD performed by Tone Santos DO at [address]    12 INCHES REMOVED D/T OBSTRUCTION    COLONOSCOPY      CYST REMOVAL Right     right facial    HYSTERECTOMY  1973    taken as a result of recurring cysts    LUMBAR FUSION N/A 02/10/2020    LUMBAR L4-5 POSTERIOR  DECOMPRESSION INSTRUMENTATION FUSION WCEMENT AUGMENTATION/ performed by Jamaal Luke MD at [place] [id number] N/A 06/17/2020    L5-S1 PLIF L4-L5 REVISION performed by Jamaal Luke MD at [address]  08/14/2014    [address]    UNILATERAL due to cyst    OVARY REMOVAL  1971    partial, due to cyst   [place] SINUS SURGERY  2004    UPPER GASTROINTESTINAL ENDOSCOPY N/A 05/31/2019    EGD ESOPHAGOGASTRODUODENOSCOPY performed by Rachel Gomez MD at [address] [facility] 08/05/2019    EGD BIOPSY performed by Anna Nunes MD at [address] [facility] N/A 08/23/2019    EGD BIOPSY performed by Rachel Gomez MD at [address] [facility] 03/05/2019    WRIST OPEN REDUCTION INTERNAL FIXATION performed by Ivan Shone, MD at [address] Previous Medications    AMLODIPINE (NORVASC) 10 MG TABLET    Take 1 tablet by mouth daily    APIXABAN (ELIQUIS) 5 MG TABS TABLET    Please take 2 tablets by mouth for the first week then take 1 tablet by mouth BID for acute DVT    ASPIRIN 81 MG CHEWABLE TABLET    Take 81 mg by mouth daily Indications: pt reports intstructed to hold x 10 days prior to surgery    ATORVASTATIN (LIPITOR) 80 MG TABLET    Take 0.5 tablets by mouth nightly    BLOOD GLUCOSE MONITOR STRIPS    Test 2 times a day & as needed for symptoms of irregular blood glucose. CALCIUM CARBONATE-VITAMIN D (CALCIUM 500/D) 500-125 MG-UNIT TABS    Take 1 tablet by mouth 2 times daily     CARVEDILOL (COREG) 12.5 MG TABLET    Take 1 tablet by mouth 2 times daily    CITALOPRAM (CELEXA) 20 MG TABLET    Take 1 tablet by mouth daily    CYANOCOBALAMIN ([employer] VITAMIN B12) 1000 MCG TABLET    Take 1 tablet by mouth daily    DIPHENHYDRAMINE HCL (BENADRYL ALLERGY PO)    Take 1 capsule by mouth daily Takes q HS    DOCUSATE SODIUM (COLACE) 100 MG CAPSULE    Take 1 capsule by mouth 2 times daily as needed for Constipation    FENOFIBRATE MICRONIZED (LOFIBRA) 134 MG CAPSULE    Take 1 capsule by mouth every morning (before breakfast)    FERROUS SULFATE (IRON 325) 325 (65 FE) MG TABLET    Take 1 tablet by mouth 2 times daily    FUROSEMIDE (LASIX) 40 MG TABLET    Take 1 tablet by mouth 2 times daily    LANCETS MISC    1 each by Does not apply route daily    LISINOPRIL (PRINIVIL;ZESTRIL) 30 MG TABLET    Take 1 tablet by mouth daily    NITROGLYCERIN (NITROSTAT) 0.4 MG SL TABLET    Place 1 tablet under the tongue every 5 minutes as needed for Chest pain    PRAMIPEXOLE (MIRAPEX) 0.5 MG TABLET    Take 1 tablet by mouth nightly    VITAMIN D, ERGOCALCIFEROL, PO    Take by mouth       ALLERGIES     is allergic to bactrim [sulfamethoxazole-trimethoprim], codeine, and seasonal.    SOCIAL HISTORY      reports that she quit smoking about 4 years ago.  Her smoking use included cigarettes. She started smoking about 26 years ago. She has a 10.00 pack-year smoking history. She has never used smokeless tobacco. She reports that she does not drink alcohol and does not use drugs. PHYSICAL EXAM     INITIAL VITALS: BP (!) 170/78   Pulse 77   Temp 98 °F (36.7 °C) (Oral)   Resp 17   SpO2 95%      Physical Exam  Vitals and nursing note reviewed. Constitutional:       General: She is not in acute distress. Appearance: She is well-developed. She is not diaphoretic. HENT:      Head: Normocephalic and atraumatic. Eyes:      General: No scleral icterus. Right eye: No discharge. Left eye: No discharge. Conjunctiva/sclera: Conjunctivae normal.      Pupils: Pupils are equal, round, and reactive to light. Cardiovascular:      Rate and Rhythm: Normal rate and regular rhythm. Heart sounds: Normal heart sounds. No murmur heard. No friction rub. No gallop. Pulmonary:      Effort: Pulmonary effort is normal. No respiratory distress. Breath sounds: Normal breath sounds. No wheezing or rales. Chest:      Chest wall: Tenderness (Right anterior) present. Abdominal:      General: Bowel sounds are normal. There is no distension. Palpations: Abdomen is soft. There is no mass. Tenderness: There is no abdominal tenderness. There is no guarding or rebound. Musculoskeletal:         General: No tenderness. Cervical back: No swelling, edema, deformity, erythema, signs of trauma, lacerations, rigidity, spasms, torticollis, tenderness, bony tenderness or crepitus. Pain with movement present. Decreased range of motion. Right lower leg: Swelling present. No deformity, lacerations, tenderness or bony tenderness. 3+ Edema present. Left lower leg: Swelling present. No deformity, lacerations, tenderness or bony tenderness. 3+ Edema present. Skin:     General: Skin is warm and dry. Coloration: Skin is not pale.       Findings: No erythema or rash.   Neurological:      Mental Status: She is alert and oriented to person, place, and time. Cranial Nerves: No cranial nerve deficit. Sensory: No sensory deficit. Motor: No abnormal muscle tone. Coordination: Coordination normal.      Deep Tendon Reflexes: Reflexes normal.   Psychiatric:         Behavior: Behavior normal.         Thought Content: Thought content normal.         Judgment: Judgment normal.         DIAGNOSTIC RESULTS     RADIOLOGY:All plain film, CT,MRI, and formal ultrasound images (except ED bedside ultrasound) are read by the radiologist and the interpretations are directly viewed by the emergency physician. CT HEAD WO CONTRAST    Result Date: 3/16/2022  EXAMINATION: CT OF THE HEAD WITHOUT CONTRAST  3/16/2022 9:28 pm TECHNIQUE: CT of the head was performed without the administration of intravenous contrast. Dose modulation, iterative reconstruction, and/or weight based adjustment of the mA/kV was utilized to reduce the radiation dose to as low as reasonably achievable. COMPARISON: 12/22/2021 HISTORY: ORDERING SYSTEM PROVIDED HISTORY: Trauma. TECHNOLOGIST PROVIDED HISTORY: Trauma. Decision Support Exception - unselect if not a suspected or confirmed emergency medical condition->Emergency Medical Condition (MA) Reason for Exam: H/O multiple falls hitting head. C/O HA with increased weakness. FINDINGS: BRAIN/VENTRICLES: The cerebral and cerebellar parenchyma demonstrate volume loss. There are scattered low-attenuation areas noted supratentorially which are compatible with chronic microvascular white matter ischemic disease. No abnormal extra-axial fluid collections. The ventricles are proportional to the cerebral sulci. Gray-white differentiation is maintained without evidence of acute infarct. ORBITS: Lens implants from prior cataract surgery are noted. The orbits are otherwise unremarkable. SINUSES: There is scattered paranasal sinus disease.   Benign heterotopic bone is Old left-sided rib fractures are noted. The left 8th and 9th rib fractures could be subacute as there are some areas of callus along the margins. Degenerative changes are noted in the spine. 1. There are old left-sided rib fractures. There is some areas of callus along the left 8th and 9th rib fractures which could be subacute or chronic. Correlate with signs of pain in this region. 2. No other acute traumatic injury involving the chest. 3. Atherosclerotic disease. 4. Fatty liver. 5. Gallstone without adjacent inflammatory changes. 6. Right adrenal nodule, likely related to an adenoma, unchanged dating back to 2018, benign. CT CERVICAL SPINE WO CONTRAST    Result Date: 3/16/2022  EXAMINATION: CT OF THE CERVICAL SPINE WITHOUT CONTRAST 3/16/2022 9:28 pm TECHNIQUE: CT of the cervical spine was performed without the administration of intravenous contrast. Multiplanar reformatted images are provided for review. Dose modulation, iterative reconstruction, and/or weight based adjustment of the mA/kV was utilized to reduce the radiation dose to as low as reasonably achievable. COMPARISON: MRI on 01/26/2022. HISTORY: ORDERING SYSTEM PROVIDED HISTORY: Fall, pain. TECHNOLOGIST PROVIDED HISTORY: Fall, pain. Decision Support Exception - unselect if not a suspected or confirmed emergency medical condition->Emergency Medical Condition (MA) Reason for Exam: Multiple falls hitting head and neck. C/O neck pain. Relevant Medical/Surgical History: Cervical fusion. FINDINGS: BONES/ALIGNMENT: There is sequela of a R9-I2 posterior metallic fusion with associated laminectomies at this level. No evidence of instrumentation loosening or failure. Alignment of the cervical spine is normal.  No fracture or osseous destructive lesion. DEGENERATIVE CHANGES: Multilevel degenerative disc disease is noted in the cervical spine which is mild in severity. This is greatest at C6-C7.  SOFT TISSUES: Vascular calcifications are noted along the aorta.  The lung apices are clear. The thyroid gland is heterogeneous. There is a right-sided thyroid nodule that is low in attenuation measuring 4 mm, benign. No follow-up imaging is required. Vascular calcifications are noted in the carotid bulbs. No evidence of acute fracture in the cervical spine. LABS: All lab results were reviewed by myself, and all abnormals are listed below. Labs Reviewed   CBC WITH AUTO DIFFERENTIAL - Abnormal; Notable for the following components:       Result Value    RBC 3.52 (*)     Hemoglobin 10.9 (*)     Hematocrit 33.1 (*)     Monocytes 9 (*)     Eosinophils % 9 (*)     Absolute Eos # 0.70 (*)     All other components within normal limits   COMPREHENSIVE METABOLIC PANEL - Abnormal; Notable for the following components:    BUN 31 (*)     Total Bilirubin 0.26 (*)     All other components within normal limits         MEDICAL DECISION MAKING:     Patient has frequent multiple falls that she has seen her doctor with as she uses her walker and states that she is falling even with using her walker. Patient seems like maybe she has a contusion to her chest wall but will do a CT of her head neck and chest due to the fact that she is on the Eliquis. We will have to discuss with her what her plan is if everything comes back negative. #12 - Emergency Medicine: Utilization of CT for Minor Blunt Head Trauma (Adult)   [x] Patient is 25 or older, presenting with minor blunt head trauma.  Head CT (including cosigned orders) was ordered by an emergency care clinician for trauma because (select one or more): [SATISFIES MIPS PERFORMANCE]    Reasons:  [x] Patient is 72 or older  [] Patient GCS < 15  [] Patient has focal neurologic deficit  [] Patient has severe headache  [] Patient is vomiting  [] Severe/dangerous mechanism of injury was identified (select one or more):  [] MVA with: patient ejection, death of another passenger, rollover, speed > 40mph, airbag       EMERGENCY DEPARTMENT COURSE:   Vitals:    Vitals:    03/16/22 2017   BP: (!) 170/78   Pulse: 77   Resp: 17   Temp: 98 °F (36.7 °C)   TempSrc: Oral   SpO2: 95%       The patient was given the following medications while in the emergency department:  Orders Placed This Encounter   Medications    oxyCODONE-acetaminophen (PERCOCET) 5-325 MG per tablet 1 tablet    iopamidol (ISOVUE-370) 76 % injection 75 mL    0.9 % sodium chloride bolus    sodium chloride flush 0.9 % injection 10 mL       -------------------------  10:46 PM EDT  Patient was updated on the results. Patient has evidence of multiple remote and healing rib fractures from all these falls that she has. Patient is compliant with her walker but continues to still lose her balance and fall and has not followed up with her neurologist yet. I have updated her on results and patient wishes to be admitted to get further work-up and physical therapy. I spoke with Emily Townsend the nurse practitioner for the PCP who agrees to admit. CONSULTS:  None    PROCEDURES:  None    FINAL IMPRESSION      1. Frequent falls          DISPOSITION/PLAN   DISPOSITION Decision To Admit 03/16/2022 10:45:33 PM      PATIENT REFERREDTO:  No follow-up provider specified.     DISCHARGEMEDICATIONS:  New Prescriptions    No medications on file       (Please note that portions of this note were completed with a voice recognition program.  Efforts were made to edit thedictations but occasionally words are mis-transcribed.)    Christopher Bermudez MD  Attending Emergency Physician                        Christopher Bermudez MD  03/16/22 [id number]

## 2022-03-17 NOTE — PROGRESS NOTES
Pt has MRI Lumbar ordered. Please fill out the MRI Screening form and fax to dept @ [phone]. Any questions. .please call [facility] MRI @ [phone]. MRI exam will be scheduled after receiving the completed screening form.  Thank you!!

## 2022-03-17 NOTE — PROGRESS NOTES
Physical Therapy    Facility/Department: [facility] MED SURG  Initial Assessment    NAME: Tran Villarreal  : 1950  MRN: [id number]    Date of Service: 3/17/2022    Discharge Recommendations: The patient would benefit from an intensive level of therapy after discharge from the facility. They should be able to tolerate 3-hours of Combined OT/PT/ST over 5 days/week or at least 900 minutes of  Combined Therapy over 7 days. PT Equipment Recommendations  Other: TBD    Assessment   Body structures, Functions, Activity limitations: Decreased functional mobility ; Decreased ADL status; Decreased ROM; Decreased strength;Decreased endurance;Decreased balance  Assessment: Pt would benefit from continued PT to improve her IND. Pt is 1 assist for mobility. Treatment Diagnosis: Impaired functional mobility 2* ataxia  Specific instructions for Next Treatment: transfers, amb, balance, standing program  Prognosis: Good  Decision Making: Medium Complexity  Exam: ROM, MMT, bed mobility, transfers, amb  Clinical Presentation: Pt alert, cooperative, pleasant  Barriers to Learning: none  REQUIRES PT FOLLOW UP: Yes  Activity Tolerance  Activity Tolerance: Patient Tolerated treatment well       Patient Diagnosis(es): The encounter diagnosis was Frequent falls.      has a past medical history of Allergic rhinitis, cause unspecified, Back pain, Bowel obstruction (HCC), C. difficile diarrhea, CAD (coronary artery disease), Cardiac murmur, Cellulitis, Cellulitis, Cerebral artery occlusion with cerebral infarction ([facility] [address] COVID-19, Diverticulosis of colon (without mention of hemorrhage), GERD (gastroesophageal reflux disease), GERD (gastroesophageal reflux disease), History of blood transfusion, History of CHF (congestive heart failure), History of MI (myocardial infarction), History of ovarian cyst, History of peritonitis, HTN (hypertension), Hx of blood clots, Hyperlipidemia, Intestinal or peritoneal adhesions with obstruction (postoperative) (postinfection) ([facility] [address] Kidney infection, Lateral epicondylitis  of elbow, MDRO (multiple drug resistant organisms) resistance, Muscle strain, Other abnormal glucose, PONV (postoperative nausea and vomiting), Pre-diabetes, Restless legs syndrome (RLS), Snores, 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[facility] [address] TIA (transient ischemic attack), Uses walker, Vitamin D deficiency, Wears glasses, and Wellness examination. has a past surgical history that includes Ovary removal (1970); colectomy (7191); Appendectomy (1968); Ovary removal (1971); Hysterectomy (1973); Bunionectomy (Left); sinus surgery (2004); Colonoscopy; other surgical history (08/14/2014); cyst removal (Right); Wrist fracture surgery (Left, 03/05/2019); Upper gastrointestinal endoscopy (N/A, 05/31/2019); Upper gastrointestinal endoscopy (N/A, 08/05/2019); Upper gastrointestinal endoscopy (N/A, 08/23/2019); Abdomen surgery (1976); lumbar fusion (N/A, 02/10/2020); Cardiac catheterization; Cardiac catheterization (2005); [place] tooth extraction; lumbar fusion (N/A, 06/17/2020); back surgery; cervical fusion (05/21/2021); and cervical fusion (N/A, 5/21/2021). Restrictions  Restrictions/Precautions  Restrictions/Precautions: General Precautions,Fall Risk  Required Braces or Orthoses?: No  Implants present? : Metal implants (cervical and lumbar surgeries, L wrist ORIF)  Position Activity Restriction  Other position/activity restrictions: up as tolerated  Vision/Hearing  Vision: Impaired  Vision Exceptions: Wears glasses for reading  Hearing: Within functional limits     Subjective  General  Chart Reviewed: Yes  Patient assessed for rehabilitation services?: Yes  Additional Pertinent Hx: TIA  Family / Caregiver Present: No  Referring Practitioner: Timbo Cardoza MD  Referral Date : 03/17/22  Diagnosis: ataxia  Follows Commands: Within Functional Limits  Other (Comment): Pt is L handed  Subjective  Subjective: pt in bed resting, agreeable to PT OT.    Pain - assists with pt 2x/week on medications, laundry, etc. Lives close by. Cognition        Objective          AROM RLE (degrees)  RLE AROM: WFL  AROM LLE (degrees)  LLE AROM : WFL  AROM RUE (degrees)  RUE General AROM: See OT  AROM LUE (degrees)  LUE General AROM: See OT  Strength RLE  Strength RLE: WFL  Comment: Grossly 4-/5  Strength LLE  Strength LLE: WFL  Comment: Grossly 3+ to 4-/5  Strength RUE  Comment: See OT  Strength LUE  Comment: See OT  Coordination  Coordination and Movement description: B LE WFL rapid altenrating movements  Sensation  Overall Sensation Status: Impaired (numbess in R thigh)  Bed mobility  Rolling to Left: Minimal assistance  Supine to Sit: Minimal assistance  Sit to Supine: Unable to assess  Scooting: Contact guard assistance  Comment: Head of bed elevated. Cues and assist needed to edge of bed. Pt up in chair end of session. Transfers  Sit to Stand: Moderate Assistance  Stand to sit: Moderate Assistance  Bed to Chair: Minimal assistance  Comment: Increased assist to stand and cues to place LLE under pt. Cues to use RW and hand placement. Pt up in chair end of session. n  Ambulation  Ambulation?: Yes  Ambulation 1  Surface: level tile  Device: Rolling Walker  Quality of Gait: slow tawanda, decreased step length on L, unsteady  Gait Deviations: Decreased step length;Decreased step height;Slow Tawanda  Distance: 25'  Comments: Cues for RW management and increased step length. Pt has increased difficulty at turning to line up with chair.   Stairs/Curb  Stairs?: No     Balance  Posture: Good  Sitting - Static: Good  Sitting - Dynamic: Good;-  Standing - Static: Fair  Standing - Dynamic: Fair;-  Comments: Fall risk, standing balance with RW        Plan   Plan  Times per week: 5-6x/week  Specific instructions for Next Treatment: transfers, amb, balance, standing program  Current Treatment Recommendations: Strengthening,Balance Training,Functional Mobility Training,Transfer Training,Endurance Training,Gait Training,Equipment Evaluation, Education, & procurement,Patient/Caregiver Education & Training,Safety Education & Training  Safety Devices  Type of devices: All fall risk precautions in place,Call light within reach,Gait belt,Patient at risk for falls,Left in chair    G-Code       OutComes Score                                                  AM-PAC Score  AM-PAC Inpatient Mobility Raw Score : 16 (03/17/22 1352)  AM-PAC Inpatient T-Scale Score : 40.78 (03/17/22 1352)  Mobility Inpatient CMS 0-100% Score: 54.16 (03/17/22 1352)  Mobility Inpatient CMS G-Code Modifier : CK (03/17/22 1352)          Goals  Short term goals  Time Frame for Short term goals: 5 days  Short term goal 1: Pt to demo bed mobility SBA. Short term goal 2: Pt to perform transfers CGA. Short term goal 3: pt to amb 50'-75' with device, CGA. Short term goal 4: Pt to improve BLE strength by 1/2 MMG. Short term goal 5: Pt to improve standing balance to [facility]. Patient Goals   Patient goals :  To get stronger       Therapy Time   Individual Concurrent Group Co-treatment   Time In [address] [place] [address]         Time Out 1414         Minutes [address], [place]

## 2022-03-17 NOTE — CARE COORDINATION
CASE MANAGEMENT NOTE:    Admission Date:  3/16/2022 Willow Price is a 71 y.o.  female    Admitted for : Ataxia [R27.0]  Frequent falls [R29.6]    Met with:  Patient    PCP:  Glenda Kussmaul, MD                                Insurance:  Jonn Parekh [id number]      Is patient alert and oriented at time of discussion:  Yes    Current Residence/ Living Arrangements:  independently at home             Current Services PTA:  No    Does patient go to outpatient dialysis: No  If yes, location and chair time:     Is patient agreeable to VNS: No - not at this time will follow    Freedom of choice provided:  No    List of [address] provided: No    VNS chosen:  If needed patient had [address] in the past and would like resumed. DME:  rebeca Roger SC, GB    Home Oxygen: No    Nebulizer: No    CPAP/BIPAP: No    Supplier: N/A    Potential Assistance Needed: No    SNF needed: No    Freedom of choice and list provided: NA    Pharmacy:  [facility]       Does Patient want to use MEDS to BEDS? No    Is patient currently receiving oral anticoagulation therapy? Yes, eliquis    Is the Patient an [facility] with Readmission Risk Score greater than 14%? No  If yes, pt needs a follow up appointment made within 7 days. Family Members/Caregivers that pt would like involved in their care:    Yes    If yes, list name here:  , True    Transportation Provider:  Patient and family    Discharge Plan:  Patient from one [place] home with . DME:  Walker, cane, SC, GB, Rollator. Yadira PTA. Has had [place] [facility] in past would like resumed if necessary.               Electronically signed by: Concetta Viveros RN on 3/17/2022 at 10:04 AM

## 2022-03-17 NOTE — H&P
[address] [facility]     HISTORY AND PHYSICAL EXAMINATION            Date:   3/17/2022  Patientname:  Ac Price  Date of admission:  3/16/2022  8:17 PM  MRN:   [id number]  Account:  [de-identified]  YOB: 1950  PCP:    Ronda Hendrickson MD  Room:   2073/2073-01  Code Status:    Full Code    CHIEF COMPLAINT     Chief Complaint   Patient presents with    Leg Pain    Shortness of Breath    Fall    Neck Pain       HISTORY OF PRESENT ILLNESS  (Character, Onset, Location, Duration,  Exacerbating/RelievingFactors, Radiation,   Associated Symptoms, Severity )      The patient is a 71 y.o.  female, with a history of anemia, spondylolisthesis, chronic DVT, chronic diastolic heart failure, CVA, major depressive disorder, s/p cervical spine fusion, stenosis of cervical spine with myelopathy, and DM type II, who presents with leg pain, shortness of breath, fall, and neck pain. According to patient and her , patient has had multiple falls recently including a fall Monday and evening and again on Tuesday. Patient reports that today she felt extremely weak upon waking.  reports that patient is unstable on her feet with poor balance. Reports history of neck surgery and patient states she has been having pain on the left side of her neck.  reports that this is due to her refusal to sleep in the bed, stating that she sleeps in her chair every night causing poor posture and muscle stiffness upon waking. Symptoms are associated with bilateral lower leg edema and pain in right leg; patient known to have DVT and is currently taking Eliquis twice daily. Denies fever, chills, cough, abdominal pain, nausea, vomiting, diarrhea, and urinary symptoms.  reports that she hit her head during fall both on Monday and on Tuesday; however, there was no loss of consciousness. There are no aggravating or alleviating factors.   Patient reports that she uses her walker with ambulation, but  feels that her fear of falling and lower leg edema make ambulation difficult. There are no alleviating factors. Symptoms are reported as constant and moderate to severe; progressively worsening. PAST MEDICAL HISTORY   Patient  has a past medical history of Allergic rhinitis, cause unspecified, Back pain, Bowel obstruction (HCC), C. difficile diarrhea, CAD (coronary artery disease), Cardiac murmur, Cellulitis, Cellulitis, Cerebral artery occlusion with cerebral infarction ([address] COVID-19, Diverticulosis of colon (without mention of hemorrhage), GERD (gastroesophageal reflux disease), GERD (gastroesophageal reflux disease), History of blood transfusion, History of CHF (congestive heart failure), History of MI (myocardial infarction), History of ovarian cyst, History of peritonitis, HTN (hypertension), Hx of blood clots, Hyperlipidemia, Intestinal or peritoneal adhesions with obstruction (postoperative) (postinfection) ([address] Kidney infection, Lateral epicondylitis  of elbow, MDRO (multiple drug resistant organisms) resistance, Muscle strain, Other abnormal glucose, PONV (postoperative nausea and vomiting), Pre-diabetes, Restless legs syndrome (RLS), Snores, 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[address] TIA (transient ischemic attack), Uses walker, Vitamin D deficiency, Wears glasses, and Wellness examination. PAST SURGICAL HISTORY    Patient  has a past surgical history that includes Ovary removal (1970); colectomy (1969); Appendectomy (1968); Ovary removal (1971); Hysterectomy (1973); Bunionectomy (Left); sinus surgery (2004); Colonoscopy; other surgical history (08/14/2014); cyst removal (Right); Wrist fracture surgery (Left, 03/05/2019); Upper gastrointestinal endoscopy (N/A, 05/31/2019); Upper gastrointestinal endoscopy (N/A, 08/05/2019); Upper gastrointestinal endoscopy (N/A, 08/23/2019); Abdomen surgery (1976); lumbar fusion (N/A, 02/10/2020);  Cardiac catheterization; Cardiac catheterization (2005); [place] tooth extraction; lumbar fusion (N/A, 06/17/2020); back surgery; cervical fusion (05/21/2021); and cervical fusion (N/A, 5/21/2021). FAMILY HISTORY    Patient family history includes Diabetes in her mother; Heart Disease in her brother, father, maternal grandmother, and mother; High Blood Pressure in her brother, mother, and sister; Stroke in her mother. SOCIAL HISTORY    Patient  reports that she quit smoking about 4 years ago. Her smoking use included cigarettes. She started smoking about 26 years ago. She has a 10.00 pack-year smoking history. She has never used smokeless tobacco. She reports that she does not drink alcohol and does not use drugs. HOME MEDICATIONS        Prior to Admission medications    Medication Sig Start Date End Date Taking?  Authorizing Provider   amLODIPine (NORVASC) 10 MG tablet Take 1 tablet by mouth daily 3/10/22  Yes Juli Infante MD   furosemide (LASIX) 40 MG tablet Take 1 tablet by mouth 2 times daily 3/1/22  Yes MAIK García CNP   carvedilol (COREG) 12.5 MG tablet Take 1 tablet by mouth 2 times daily 2/23/22  Yes MAIK García CNP   atorvastatin (LIPITOR) 80 MG tablet Take 0.5 tablets by mouth nightly 2/1/22  Yes MAIK García CNP   lisinopril (PRINIVIL;ZESTRIL) 30 MG tablet Take 1 tablet by mouth daily 1/21/22  Yes Amberly Aguirre MD   fenofibrate micronized (LOFIBRA) 134 MG capsule Take 1 capsule by mouth every morning (before breakfast) 1/13/22  Yes Amberly Aguirre MD   pramipexole (MIRAPEX) 0.5 MG tablet Take 1 tablet by mouth nightly 1/6/22  Yes Amberly Aguirre MD   citalopram (CELEXA) 20 MG tablet Take 1 tablet by mouth daily 1/3/22  Yes Judge Guilherme MD   docusate sodium (COLACE) 100 MG capsule Take 1 capsule by mouth 2 times daily as needed for Constipation 5/30/21  Yes Marilia Marley,    diphenhydrAMINE HCl (BENADRYL ALLERGY PO) Take 1 capsule by mouth daily Takes q HS   Yes Historical Provider, MD   cyanocobalamin ([employer] VITAMIN B12) 1000 MCG tablet Take 1 tablet by mouth daily 12/3/20  Yes Stevie Guillermo MD   ferrous sulfate (IRON 325) 325 (65 Fe) MG tablet Take 1 tablet by mouth 2 times daily 7/2/20  Yes Jhon Blankenship MD   VITAMIN D, ERGOCALCIFEROL, PO Take by mouth   Yes Historical Provider, MD   Calcium Carbonate-Vitamin D (CALCIUM 500/D) 500-125 MG-UNIT TABS Take 1 tablet by mouth daily    Yes Historical Provider, MD   apixaban (ELIQUIS) 5 MG TABS tablet Please take 2 tablets by mouth for the first week then take 1 tablet by mouth BID for acute DVT  Patient taking differently: Take 5 mg by mouth 2 times daily take 1 tablet by mouth BID for acute DVT 2/17/22   MAIK Blackwell - CNP   nitroGLYCERIN (NITROSTAT) 0.4 MG SL tablet Place 1 tablet under the tongue every 5 minutes as needed for Chest pain 9/1/21   Stevie Guillermo MD   Lancets MISC 1 each by Does not apply route daily 10/10/19   Bri Rogers MD   blood glucose monitor strips Test 2 times a day & as needed for symptoms of irregular blood glucose. 10/10/19   Bri Rogers MD       ALLERGIES      Bactrim [sulfamethoxazole-trimethoprim], Codeine, and Seasonal    REVIEW OF SYSTEMS     Review of Systems   Constitutional: Positive for activity change. Negative for chills, diaphoresis and fever. HENT: Negative for congestion and sore throat. Respiratory: Negative for cough, shortness of breath and wheezing. Cardiovascular: Positive for leg swelling. Negative for chest pain and palpitations. Gastrointestinal: Negative for abdominal pain, constipation, diarrhea, nausea and vomiting. Genitourinary: Negative for dysuria, frequency and urgency. Musculoskeletal: Positive for gait problem (Ataxia) and neck pain. Negative for back pain and myalgias. Multiple falls   Skin: Negative for rash. Neurological: Positive for weakness (generalized ). Negative for dizziness and headaches.    Psychiatric/Behavioral: The patient is not nervous/anxious. PHYSICAL EXAM      BP (!) 144/62   Pulse 73   Temp 98.6 °F (37 °C)   Resp 18   Ht 5' 3\" (1.6 m)   Wt 190 lb 7.6 oz (86.4 kg)   SpO2 98%   BMI 33.74 kg/m²  Body mass index is 33.74 kg/m². Physical Exam  Constitutional:       General: She is not in acute distress. Appearance: She is well-developed. She is obese. She is not diaphoretic. HENT:      Head: Normocephalic and atraumatic. Eyes:      Conjunctiva/sclera: Conjunctivae normal.      Pupils: Pupils are equal, round, and reactive to light. Neck:      Trachea: No tracheal deviation. Cardiovascular:      Rate and Rhythm: Normal rate and regular rhythm. Heart sounds: Normal heart sounds. No murmur heard. No friction rub. No gallop. Pulmonary:      Effort: Pulmonary effort is normal. No respiratory distress. Breath sounds: Normal breath sounds. No wheezing or rales. Chest:      Chest wall: Tenderness (Anterior chest tenderness with palpation) present. Abdominal:      General: Bowel sounds are normal. There is no distension. Palpations: Abdomen is soft. Tenderness: There is no abdominal tenderness. There is no guarding. Musculoskeletal:         General: No tenderness. Cervical back: Normal range of motion and neck supple. Right lower leg: Edema present. Left lower leg: Edema present. Comments: 3+ pitting edema BLE; decreased range of motion cervical spine. Lymphadenopathy:      Cervical: No cervical adenopathy. Skin:     General: Skin is warm and dry. Coloration: Skin is not pale. Findings: No erythema or rash. Neurological:      Mental Status: She is alert and oriented to person, place, and time. Motor: No seizure activity. Coordination: Coordination normal.   Psychiatric:         Behavior: Behavior normal.         Thought Content:  Thought content normal.       DIAGNOSTICS      EKG: ekgNo results found for this or any previous visit (from the past 4464 hour(s)). Labs:  CBC:   Recent Labs     03/16/22 2047   WBC 7.9   HGB 10.9*        BMP:    Recent Labs     03/16/22 2047      K 3.8      CO2 22   BUN 31*   CREATININE 0.87   GLUCOSE 96     S. Calcium:  Recent Labs     03/16/22 2047   CALCIUM 8.9     S. Ionized Calcium:No results for input(s): IONCA in the last 72 hours. S. Magnesium:No results for input(s): MG in the last 72 hours. S. Phosphorus:No results for input(s): PHOS in the last 72 hours. S. Glucose:No results for input(s): POCGLU in the last 72 hours. Glycosylated hemoglobin A1C:   Lab Results   Component Value Date    LABA1C 5.1 02/01/2022     Hepatic:   Recent Labs     03/16/22 2047   AST 18   ALT 15   ALKPHOS 44     CARDIAC ENZY: No results for input(s): CKTOTAL, CKMB, CKMBINDEX, TROPHS, MYOGLOBIN in the last 72 hours. INR: No results for input(s): INR in the last 72 hours. BNP: No results for input(s): PROBNP in the last 72 hours. ABGs: No results for input(s): PH, PCO2, PO2, HCO3, O2SAT in the last 72 hours. Lipids: No results for input(s): CHOL, TRIG, HDL, LDL, LDLCALC in the last 72 hours. Pancreatic functions:No results for input(s): LIPASE, AMYLASE in the last 72 hours. Jenelle Damme: No results for input(s): LACTA in the last 72 hours. Thyroid functions:   Lab Results   Component Value Date    TSH 1.43 05/20/2019      U/A:No results for input(s): NITRITE, COLORU, WBCUA, RBCUA, MUCUS, BACTERIA, CLARITYU, SPECGRAV, LEUKOCYTESUR, BLOODU, GLUCOSEU, AMORPHOUS in the last 72 hours. Invalid input(s): [place]  No results for input(s): COVID19 in the last 72 hours.   Imaging/Diagonstics:     CT HEAD WO CONTRAST    Result Date: 3/16/2022  EXAMINATION: CT OF THE HEAD WITHOUT CONTRAST  3/16/2022 9:28 pm TECHNIQUE: CT of the head was performed without the administration of intravenous contrast. Dose modulation, iterative reconstruction, and/or weight based adjustment of the mA/kV was utilized to reduce the radiation dose to as low as reasonably achievable. COMPARISON: 12/22/2021 HISTORY: ORDERING SYSTEM PROVIDED HISTORY: Trauma. TECHNOLOGIST PROVIDED HISTORY: Trauma. Decision Support Exception - unselect if not a suspected or confirmed emergency medical condition->Emergency Medical Condition (MA) Reason for Exam: H/O multiple falls hitting head. C/O HA with increased weakness. FINDINGS: BRAIN/VENTRICLES: The cerebral and cerebellar parenchyma demonstrate volume loss. There are scattered low-attenuation areas noted supratentorially which are compatible with chronic microvascular white matter ischemic disease. No abnormal extra-axial fluid collections. The ventricles are proportional to the cerebral sulci. Gray-white differentiation is maintained without evidence of acute infarct. ORBITS: Lens implants from prior cataract surgery are noted. The orbits are otherwise unremarkable. SINUSES: There is scattered paranasal sinus disease. Benign heterotopic bone is noted in the right maxillary sinus, unchanged from a CT since 08/05/2015. There is scattered paranasal sinus disease with greatest involvement in the left maxillary sinus. There is sequela of prior sinus surgery. The mastoid air cells are clear. SOFT TISSUES/SKULL:  The calvarium is intact. No appreciable scalp soft tissue swelling. 1. No acute intracranial abnormality. 2. Cerebral and cerebellar parenchymal volume loss with chronic microvascular white matter ischemic disease. CT CHEST W CONTRAST    Result Date: 3/16/2022  EXAMINATION: CT OF THE CHEST WITH CONTRAST 3/16/2022 9:30 pm TECHNIQUE: CT of the chest was performed with the administration of intravenous contrast. Multiplanar reformatted images are provided for review. Dose modulation, iterative reconstruction, and/or weight based adjustment of the mA/kV was utilized to reduce the radiation dose to as low as reasonably achievable. COMPARISON: 01/21/2018.  HISTORY: ORDERING SYSTEM PROVIDED HISTORY: Fall, pain. TECHNOLOGIST PROVIDED HISTORY: Fall, pain. Decision Support Exception - unselect if not a suspected or confirmed emergency medical condition->Emergency Medical Condition (MA) Reason for Exam: H/O multiple falls with increased chest pain and SOB. FINDINGS: Mediastinum: The thyroid gland is unremarkable. Atherosclerotic plaque is noted along the aorta and its branch vessels. Heart size is normal. Coronary artery vascular calcifications are noted. No pericardial effusion. No mediastinal or hilar adenopathy. Lungs/pleura: Gravity dependent atelectasis is noted in the lungs. No suspicious pulmonary nodule or parenchymal lung infiltrate. Upper Abdomen: There is fatty infiltration of the liver. There is a gallstone. Small right adrenal nodule is unchanged, compatible with an adenoma given the stability since 2018 measuring 1.2 cm. There are few scattered colonic diverticula. Soft Tissues/Bones: No appreciable soft tissue swelling is identified. Old left-sided rib fractures are noted. The left 8th and 9th rib fractures could be subacute as there are some areas of callus along the margins. Degenerative changes are noted in the spine. 1. There are old left-sided rib fractures. There is some areas of callus along the left 8th and 9th rib fractures which could be subacute or chronic. Correlate with signs of pain in this region. 2. No other acute traumatic injury involving the chest. 3. Atherosclerotic disease. 4. Fatty liver. 5. Gallstone without adjacent inflammatory changes. 6. Right adrenal nodule, likely related to an adenoma, unchanged dating back to 2018, benign. CT CERVICAL SPINE WO CONTRAST    Result Date: 3/16/2022  EXAMINATION: CT OF THE CERVICAL SPINE WITHOUT CONTRAST 3/16/2022 9:28 pm TECHNIQUE: CT of the cervical spine was performed without the administration of intravenous contrast. Multiplanar reformatted images are provided for review.  Dose modulation, iterative reconstruction, and/or weight based adjustment of the mA/kV was utilized to reduce the radiation dose to as low as reasonably achievable. COMPARISON: MRI on 01/26/2022. HISTORY: ORDERING SYSTEM PROVIDED HISTORY: Fall, pain. TECHNOLOGIST PROVIDED HISTORY: Fall, pain. Decision Support Exception - unselect if not a suspected or confirmed emergency medical condition->Emergency Medical Condition (MA) Reason for Exam: Multiple falls hitting head and neck. C/O neck pain. Relevant Medical/Surgical History: Cervical fusion. FINDINGS: BONES/ALIGNMENT: There is sequela of a A9-F7 posterior metallic fusion with associated laminectomies at this level. No evidence of instrumentation loosening or failure. Alignment of the cervical spine is normal.  No fracture or osseous destructive lesion. DEGENERATIVE CHANGES: Multilevel degenerative disc disease is noted in the cervical spine which is mild in severity. This is greatest at C6-C7. SOFT TISSUES: Vascular calcifications are noted along the aorta. The lung apices are clear. The thyroid gland is heterogeneous. There is a right-sided thyroid nodule that is low in attenuation measuring 4 mm, benign. No follow-up imaging is required. Vascular calcifications are noted in the carotid bulbs. No evidence of acute fracture in the cervical spine. MRI THORACIC SPINE WO CONTRAST    Result Date: 3/11/2022  EXAMINATION: MRI OF THE THORACIC SPINE WITHOUT CONTRAST  3/11/2022 1:23 pm TECHNIQUE: Multiplanar multisequence MRI of the thoracic spine was performed without the administration of intravenous contrast. COMPARISON: CT thoracic spine done December 22, 2021.  HISTORY: ORDERING SYSTEM PROVIDED HISTORY: Ataxia TECHNOLOGIST PROVIDED HISTORY: r/o cord compression What is the sedation requirement?->None Reason for Exam: ataxia, gait instability Additional signs and symptoms: pt stated off balance,  neck pain , dificult walking Relevant Medical/Surgical History: hx cervical and lumbar surg FINDINGS: BONES/ALIGNMENT: No acute or subacute vertebral body compression fracture. Mild 3 mm degenerative anterolisthesis of T2 on T3. no significant listhesis at the other levels. No marrow replacing process. No foci of suspicious bone marrow edema. No evidence of discitis, osteomyelitis or epidural abscess. SPINAL CORD: Normal in size and signal. SOFT TISSUES: No paraspinal mass identified. DEGENERATIVE CHANGES: Mild multilevel degenerative disc disease with marginal osteophytes. No significant spinal canal stenosis. Moderate to severe bilateral T2-T3 neural foraminal narrowing. Mild-to-moderate right T5-T6 and T6-T7 neural foraminal narrowing. Incompletely evaluated postsurgical changes in the cervical spine. 1.  No acute abnormality in the thoracic spine. No significant spinal canal stenosis. 2.  The thoracic spinal cord is normal in size and signal. 3.  Moderate to severe bilateral T2-T3 neural foraminal narrowing. Mild-to-moderate right T5-T6 and T6-T7 neural foraminal narrowing. VL Lower Extremity Bilateral Venous Duplex    Result Date: 2/16/2022    [facility]  Vascular Lower Extremities DVT Study Procedure   Patient Name    Ras Shipman  Date of Study             02/16/2022                  K   Date of Birth   1950    Gender                    Female   Age             71 year(s)    Race                         Room Number   Corporate ID #  [id number]   Patient Acct #  [de-identified]   MR #            [id number]        Reyes Masker   Accession #     [id number]    Interpreting Physician    Urmila Dunn   Referring Nurse Eddie La       Referring Physician  Practitioner    Joe Rubio, CNP  Procedure Type of Study:   Veins: Lower Extremities DVT Study, Venous Scan Lower Bilateral.  Indications for Study:Leg Swelling. Patient Status:Out Patient. Technical Quality:Limited visualization. Limitation reason:Edema.   Conclusions   Summary   Simultaneous real time imaging utilizing B-Mode, color doppler and  spectral waveform analysis was performed on the bilateral lower  extremities for venous examination of the deep and superficial systems. Findings are:   Right:  Acute deep venous thrombosis identified in the common femoral vein. Left:  No evidence of deep or superficial venous thrombosis. Signature   ----------------------------------------------------------------  Electronically signed by James Lama(Sonographer) on  02/16/2022 01:33 PM  ----------------------------------------------------------------   ----------------------------------------------------------------  Electronically signed by Yessenia Haley(Interpreting  physician) on 02/16/2022 03:49 PM  ----------------------------------------------------------------  Findings:   Right Impression:                          Left Impression:  Partial compressibility of the common      Non visualization of the  femoral vein with hyperechoic intraluminal peroneal veins. content and continuous Doppler response. Remaining deep veins  Non visualization of the peroneal veins. demonstrate normal                                             compressibility and  Remaining deep veins demonstrate normal    augmentation. compressibility and augmentation. Normal compressibility of the  Normal compressibility of the great        great saphenous vein. saphenous vein. Normal compressibility of the  Normal compressibility of the small        small saphenous vein. saphenous vein. Velocities are measured in cm/s ; Diameters are measured in cm Right Lower Extremities DVT Study Measurements Right 2D Measurements +------------------------------------+----------+---------------+----------+ ! Location                            ! Visualized! Compressibility! Thrombosis! +------------------------------------+----------+---------------+----------+ ! Common Femoral                      !Yes       ! Partial        !          ! +------------------------------------+----------+---------------+----------+ ! Prox Femoral                        !Yes       ! Yes            ! None      ! +------------------------------------+----------+---------------+----------+ ! Mid Femoral                         !Yes       ! Yes            ! None      ! +------------------------------------+----------+---------------+----------+ ! Dist Femoral                        !Yes       ! Yes            ! None      ! +------------------------------------+----------+---------------+----------+ ! Popliteal                           !Yes       ! Yes            ! None      ! +------------------------------------+----------+---------------+----------+ ! Sapheno Femoral Junction            ! Yes       ! Yes            ! None      ! +------------------------------------+----------+---------------+----------+ ! PTV                                 ! Yes       ! Yes            ! None      ! +------------------------------------+----------+---------------+----------+ ! Peroneal                            !No        !               !          ! +------------------------------------+----------+---------------+----------+ ! Gastroc                             ! Yes       ! Yes            ! None      ! +------------------------------------+----------+---------------+----------+ ! GSV Thigh                           ! Yes       ! Yes            ! None      ! +------------------------------------+----------+---------------+----------+ ! GSV Knee                            ! Yes       ! Yes            ! None      ! +------------------------------------+----------+---------------+----------+ ! GSV Ankle                           ! Yes       ! Yes            ! None      ! +------------------------------------+----------+---------------+----------+ ! SSV !Yes       !Yes            ! None      ! +------------------------------------+----------+---------------+----------+ Right Doppler Measurements +-------------------------+----------+------+------------------------------+ ! Location                 ! Signal    !Reflux! Reflux (msec)                 ! +-------------------------+----------+------+------------------------------+ ! Common Femoral           !Continuous!      !                              ! +-------------------------+----------+------+------------------------------+ ! Prox Femoral             !Phasic    !      !                              ! +-------------------------+----------+------+------------------------------+ ! Popliteal                !Phasic    !      !                              ! +-------------------------+----------+------+------------------------------+ Left Lower Extremities DVT Study Measurements Left 2D Measurements +------------------------------------+----------+---------------+----------+ ! Location                            ! Visualized! Compressibility! Thrombosis! +------------------------------------+----------+---------------+----------+ ! Common Femoral                      !Yes       ! Yes            ! None      ! +------------------------------------+----------+---------------+----------+ ! Prox Femoral                        !Yes       ! Yes            ! None      ! +------------------------------------+----------+---------------+----------+ ! Mid Femoral                         !Yes       ! Yes            ! None      ! +------------------------------------+----------+---------------+----------+ ! Dist Femoral                        !Yes       ! Yes            ! None      ! +------------------------------------+----------+---------------+----------+ ! Popliteal                           !Yes       ! Yes            ! None      ! +------------------------------------+----------+---------------+----------+ ! Sapheno Femoral Junction !Yes       !Yes            ! None      ! +------------------------------------+----------+---------------+----------+ ! PTV                                 ! Yes       ! Yes            ! None      ! +------------------------------------+----------+---------------+----------+ ! Peroneal                            !No        !               !          ! +------------------------------------+----------+---------------+----------+ ! Gastroc                             ! Yes       ! Yes            ! None      ! +------------------------------------+----------+---------------+----------+ ! GSV Thigh                           ! Yes       ! Yes            ! None      ! +------------------------------------+----------+---------------+----------+ ! GSV Knee                            ! Yes       ! Yes            ! None      ! +------------------------------------+----------+---------------+----------+ ! GSV Ankle                           ! Yes       ! Yes            ! None      ! +------------------------------------+----------+---------------+----------+ ! SSV                                 ! Yes       ! Yes            ! None      ! +------------------------------------+----------+---------------+----------+ Left Doppler Measurements +---------------------------+------+------+--------------------------------+ ! Location                   ! Signal!Reflux! Reflux (msec)                   ! +---------------------------+------+------+--------------------------------+ ! Common Femoral             !Phasic!      !                                ! +---------------------------+------+------+--------------------------------+ ! Prox Femoral               !Phasic!      !                                ! +---------------------------+------+------+--------------------------------+ ! Popliteal                  !Phasic!      !                                ! +---------------------------+------+------+--------------------------------+    ASSESSMENT  and  PLAN Principal Problem:    Ataxia  Resolved Problems:    * No resolved hospital problems. *    Plan:  Ataxia  -Patient ambulates with walker  -Reports being unsteady and off balance  - feels patient walks poorly d/t edema and fear of falling  -PT and OT eval and treat  -Reports that insurance denying placement for acute rehab services  -Social Service consult for discharge planning  -Patient would benefit from ARU  -- states that services have already been denied    Multiple Falls  -CT head no intracranial abnormality  -Cerebral and cerebellar parenchymal volume loss  --Chronic microvascular white matter ischemic disease  -No evidence of acute fracture of the cervical spine  -CT chest -old left-sided rib fractures  --Areas of callus could be subacute or chronic  -No other traumatic injury involving chest  -See full report above for additional findings  -Patient sees Dr. Adama Boucher, neurosurgery  --Had follow-up appointment on 3/4/2022  --Recommend evaluation with neurologist Dr. Gypsy Bloch  --We will consult    Peripheral edema  -Change home dose Lasix to IV for now    Consultations:     None      Glorious MAIK Linares - CNP   3/17/2022  6:16 AM    Josef [address] [place] [address], [address] [place] [address]. Phone [phone]     Attending Physician Statement  I have discussed the care of [place] with the CNP. I have examined the patient myself and taken ros and hpi , including pertinent history and exam findings. I have reviewed the key elements of all parts of the encounter with the CNPt. I agree with the assessment, plan and orders as documented by the CNP.     Presenting with multiple falls over last several weeks in the setting of chronic cervical spine stenosis with myelopathy status post cervical fusion follow neurosurgery  Recently started on Eliquis for acute DVT right leg, complaining of right leg pain  Trauma work-up CT brain negative in ER  Multiple remote and healing rib fractures noted  Uses walker at home  Neurology consulted for worsening gait instability-reset MRI thoracic spine did show T2-T3 and T5-T7 moderate neural foraminal narrowing  PT OT consult, will need placement    Her last fall was related to post void dizziness  Will check orthostatic    Bilateral leg swelling, skin excoriation with surrounding redness/early cellulitis-no fever no leukocytosis-we will startabx  Body mass index is 33.74 kg/m².       DVT prophylaxis-Eliquis    Electronically signed by dAiel Salmeron MD

## 2022-03-18 ENCOUNTER — APPOINTMENT (OUTPATIENT)
Dept: MRI IMAGING | Age: 72
End: 2022-03-18
Payer: MEDICARE

## 2022-03-18 LAB
ANION GAP SERPL CALCULATED.3IONS-SCNC: 13 MMOL/L (ref 9–17)
BUN BLDV-MCNC: 26 MG/DL (ref 8–23)
CALCIUM SERPL-MCNC: 8.8 MG/DL (ref 8.6–10.4)
CHLORIDE BLD-SCNC: 104 MMOL/L (ref 98–107)
CO2: 26 MMOL/L (ref 20–31)
CREAT SERPL-MCNC: 0.85 MG/DL (ref 0.5–0.9)
EKG ATRIAL RATE: 70 BPM
EKG P AXIS: 63 DEGREES
EKG P-R INTERVAL: 162 MS
EKG Q-T INTERVAL: 394 MS
EKG QRS DURATION: 82 MS
EKG QTC CALCULATION (BAZETT): 425 MS
EKG R AXIS: 40 DEGREES
EKG T AXIS: 44 DEGREES
EKG VENTRICULAR RATE: 70 BPM
GFR AFRICAN AMERICAN: >60 ML/MIN
GFR NON-AFRICAN AMERICAN: >60 ML/MIN
GFR SERPL CREATININE-BSD FRML MDRD: ABNORMAL ML/MIN/{1.73_M2}
GLUCOSE BLD-MCNC: 112 MG/DL (ref 70–99)
HCT VFR BLD CALC: 33.2 % (ref 36–46)
HEMOGLOBIN: 11.1 G/DL (ref 12–16)
MCH RBC QN AUTO: 31.5 PG (ref 26–34)
MCHC RBC AUTO-ENTMCNC: 33.5 G/DL (ref 31–37)
MCV RBC AUTO: 94 FL (ref 80–100)
PDW BLD-RTO: 14 % (ref 11.5–14.9)
PLATELET # BLD: 318 K/UL (ref 150–450)
PMV BLD AUTO: 7.1 FL (ref 6–12)
POTASSIUM SERPL-SCNC: 3.6 MMOL/L (ref 3.7–5.3)
RBC # BLD: 3.53 M/UL (ref 4–5.2)
SODIUM BLD-SCNC: 143 MMOL/L (ref 135–144)
WBC # BLD: 7.3 K/UL (ref 3.5–11)

## 2022-03-18 PROCEDURE — 85027 COMPLETE CBC AUTOMATED: CPT

## 2022-03-18 PROCEDURE — 6370000000 HC RX 637 (ALT 250 FOR IP): Performed by: PSYCHIATRY & NEUROLOGY

## 2022-03-18 PROCEDURE — 2580000003 HC RX 258: Performed by: NURSE PRACTITIONER

## 2022-03-18 PROCEDURE — G0378 HOSPITAL OBSERVATION PER HR: HCPCS

## 2022-03-18 PROCEDURE — 80048 BASIC METABOLIC PNL TOTAL CA: CPT

## 2022-03-18 PROCEDURE — 93010 ELECTROCARDIOGRAM REPORT: CPT | Performed by: INTERNAL MEDICINE

## 2022-03-18 PROCEDURE — 6370000000 HC RX 637 (ALT 250 FOR IP): Performed by: INTERNAL MEDICINE

## 2022-03-18 PROCEDURE — 72148 MRI LUMBAR SPINE W/O DYE: CPT

## 2022-03-18 PROCEDURE — 99225 PR SBSQ OBSERVATION CARE/DAY 25 MINUTES: CPT | Performed by: INTERNAL MEDICINE

## 2022-03-18 PROCEDURE — 99225 PR SBSQ OBSERVATION CARE/DAY 25 MINUTES: CPT | Performed by: PSYCHIATRY & NEUROLOGY

## 2022-03-18 PROCEDURE — 6360000002 HC RX W HCPCS: Performed by: NURSE PRACTITIONER

## 2022-03-18 PROCEDURE — 6360000002 HC RX W HCPCS: Performed by: PSYCHIATRY & NEUROLOGY

## 2022-03-18 PROCEDURE — 6370000000 HC RX 637 (ALT 250 FOR IP): Performed by: NURSE PRACTITIONER

## 2022-03-18 PROCEDURE — 96376 TX/PRO/DX INJ SAME DRUG ADON: CPT

## 2022-03-18 PROCEDURE — 96375 TX/PRO/DX INJ NEW DRUG ADDON: CPT

## 2022-03-18 PROCEDURE — 36415 COLL VENOUS BLD VENIPUNCTURE: CPT

## 2022-03-18 RX ORDER — LORAZEPAM 2 MG/ML
1 INJECTION INTRAMUSCULAR ONCE
Status: COMPLETED | OUTPATIENT
Start: 2022-03-18 | End: 2022-03-18

## 2022-03-18 RX ORDER — POTASSIUM CHLORIDE 7.45 MG/ML
10 INJECTION INTRAVENOUS PRN
Status: DISCONTINUED | OUTPATIENT
Start: 2022-03-18 | End: 2022-03-23 | Stop reason: HOSPADM

## 2022-03-18 RX ORDER — POTASSIUM CHLORIDE 20 MEQ/1
40 TABLET, EXTENDED RELEASE ORAL PRN
Status: DISCONTINUED | OUTPATIENT
Start: 2022-03-18 | End: 2022-03-23 | Stop reason: HOSPADM

## 2022-03-18 RX ADMIN — METRONIDAZOLE 100 MG: 500 TABLET ORAL at 19:39

## 2022-03-18 RX ADMIN — CALCIUM CARBONATE 600 MG (1,500 MG)-VITAMIN D3 400 UNIT TABLET 1 TABLET: at 08:34

## 2022-03-18 RX ADMIN — CEPHALEXIN 500 MG: 500 CAPSULE ORAL at 08:34

## 2022-03-18 RX ADMIN — LISINOPRIL 30 MG: 20 TABLET ORAL at 08:34

## 2022-03-18 RX ADMIN — FERROUS SULFATE TAB 325 MG (65 MG ELEMENTAL FE) 325 MG: 325 (65 FE) TAB at 08:34

## 2022-03-18 RX ADMIN — ONDANSETRON 4 MG: 4 TABLET, ORALLY DISINTEGRATING ORAL at 08:34

## 2022-03-18 RX ADMIN — FUROSEMIDE 40 MG: 10 INJECTION, SOLUTION INTRAMUSCULAR; INTRAVENOUS at 18:30

## 2022-03-18 RX ADMIN — AMLODIPINE BESYLATE 10 MG: 5 TABLET ORAL at 08:36

## 2022-03-18 RX ADMIN — CYANOCOBALAMIN TAB 1000 MCG 1000 MCG: 1000 TAB at 08:36

## 2022-03-18 RX ADMIN — CARVEDILOL 12.5 MG: 12.5 TABLET, FILM COATED ORAL at 08:34

## 2022-03-18 RX ADMIN — PRAMIPEXOLE DIHYDROCHLORIDE 0.5 MG: 0.25 TABLET ORAL at 19:39

## 2022-03-18 RX ADMIN — ACETAMINOPHEN 650 MG: 325 TABLET, FILM COATED ORAL at 19:42

## 2022-03-18 RX ADMIN — APIXABAN 5 MG: 5 TABLET, FILM COATED ORAL at 08:36

## 2022-03-18 RX ADMIN — FUROSEMIDE 40 MG: 10 INJECTION, SOLUTION INTRAMUSCULAR; INTRAVENOUS at 08:34

## 2022-03-18 RX ADMIN — SODIUM CHLORIDE, PRESERVATIVE FREE 10 ML: 5 INJECTION INTRAVENOUS at 08:37

## 2022-03-18 RX ADMIN — SODIUM CHLORIDE, PRESERVATIVE FREE 10 ML: 5 INJECTION INTRAVENOUS at 19:39

## 2022-03-18 RX ADMIN — METRONIDAZOLE 100 MG: 500 TABLET ORAL at 08:34

## 2022-03-18 RX ADMIN — FERROUS SULFATE TAB 325 MG (65 MG ELEMENTAL FE) 325 MG: 325 (65 FE) TAB at 19:39

## 2022-03-18 RX ADMIN — CITALOPRAM HYDROBROMIDE 20 MG: 20 TABLET ORAL at 08:34

## 2022-03-18 RX ADMIN — ACETAMINOPHEN 650 MG: 325 TABLET, FILM COATED ORAL at 13:10

## 2022-03-18 RX ADMIN — FENOFIBRATE 160 MG: 160 TABLET ORAL at 08:36

## 2022-03-18 RX ADMIN — CEPHALEXIN 500 MG: 500 CAPSULE ORAL at 19:38

## 2022-03-18 RX ADMIN — LORAZEPAM 1 MG: 2 INJECTION INTRAMUSCULAR; INTRAVENOUS at 14:31

## 2022-03-18 RX ADMIN — APIXABAN 5 MG: 5 TABLET, FILM COATED ORAL at 19:39

## 2022-03-18 RX ADMIN — CARVEDILOL 12.5 MG: 12.5 TABLET, FILM COATED ORAL at 19:39

## 2022-03-18 RX ADMIN — ATORVASTATIN CALCIUM 40 MG: 40 TABLET, FILM COATED ORAL at 19:38

## 2022-03-18 ASSESSMENT — PAIN DESCRIPTION - ORIENTATION: ORIENTATION: LEFT

## 2022-03-18 ASSESSMENT — PAIN DESCRIPTION - PAIN TYPE: TYPE: CHRONIC PAIN

## 2022-03-18 ASSESSMENT — PAIN SCALES - GENERAL
PAINLEVEL_OUTOF10: 3
PAINLEVEL_OUTOF10: 0
PAINLEVEL_OUTOF10: 7
PAINLEVEL_OUTOF10: 7

## 2022-03-18 ASSESSMENT — PAIN - FUNCTIONAL ASSESSMENT: PAIN_FUNCTIONAL_ASSESSMENT: PREVENTS OR INTERFERES WITH MANY ACTIVE NOT PASSIVE ACTIVITIES

## 2022-03-18 ASSESSMENT — PAIN DESCRIPTION - DESCRIPTORS: DESCRIPTORS: ACHING

## 2022-03-18 ASSESSMENT — PAIN DESCRIPTION - LOCATION: LOCATION: CHEST;RIB CAGE

## 2022-03-18 ASSESSMENT — PAIN DESCRIPTION - ONSET: ONSET: ON-GOING

## 2022-03-18 ASSESSMENT — PAIN DESCRIPTION - PROGRESSION: CLINICAL_PROGRESSION: NOT CHANGED

## 2022-03-18 NOTE — CARE COORDINATION
ONGOING DISCHARGE PLAN:    Patient is alert and oriented x4. Spoke with patient regarding discharge plan and patient confirms that plan is still home with without any needs. Pt has had [address] in the past, if recommended would like to resume. Toño Manner following. PT/OT recommends ARU, will need PM&R consulted. MRI Lumbar Spine ordered. Neuro consulted    Will continue to follow for additional discharge needs.     Electronically signed by Olga Alonso RN on 3/18/2022 at 2:29 PM

## 2022-03-18 NOTE — PROGRESS NOTES
[place] [address] Internal Medicine    Progress Note    3/18/2022    6:32 PM    Name:   Jhoan Garcia  MRN:     [id number]     Acct:      [de-identified]   Room:   2073/2073-01  IP Day:  0  Admit Date:  3/16/2022  8:17 PM    PCP:   Cachorro Avalos MD  Code Status:  Full Code    Subjective:     C/C:   Chief Complaint   Patient presents with    Leg Pain    Shortness of Breath    Fall    Neck Pain         Interval History Status: Improving    HPI:     See HPI    Review of Systems:     Denies any shortness of breath or cough  Denies chest pain or palpitations  Denies abdominal pain, diarrhea vomiting  Denies any new numbness tremors or weakness. Medications: Allergies:     Allergies   Allergen Reactions    Bactrim [Sulfamethoxazole-Trimethoprim] Other (See Comments)     sepsis    Codeine Itching    Seasonal        Current Meds:   Scheduled Meds:    sodium chloride flush  5-40 mL IntraVENous 2 times per day    amLODIPine  10 mg Oral Daily    apixaban  5 mg Oral BID    atorvastatin  40 mg Oral Nightly    carvedilol  12.5 mg Oral BID    citalopram  20 mg Oral Daily    cyanocobalamin  1,000 mcg Oral Daily    fenofibrate  160 mg Oral Daily    ferrous sulfate  325 mg Oral BID    lisinopril  30 mg Oral Daily    pramipexole  0.5 mg Oral Nightly    furosemide  40 mg IntraVENous BID    calcium carbonate w/vitamin D  1 tablet Oral Daily    cephALEXin  500 mg Oral 2 times per day    gabapentin  100 mg Oral BID     Continuous Infusions:    sodium chloride       PRN Meds: potassium chloride **OR** potassium alternative oral replacement **OR** potassium chloride, sodium chloride flush, sodium chloride, ondansetron **OR** ondansetron, magnesium hydroxide, acetaminophen **OR** acetaminophen, docusate sodium, sodium chloride flush    Data:     Past Medical History:   has a past medical history of Allergic rhinitis, cause unspecified, Back pain, Bowel obstruction (HCC), C. difficile diarrhea, CAD (coronary artery disease), Cardiac murmur, Cellulitis, Cellulitis, Cerebral artery occlusion with cerebral infarction ([address] COVID-19, Diverticulosis of colon (without mention of hemorrhage), GERD (gastroesophageal reflux disease), GERD (gastroesophageal reflux disease), History of blood transfusion, History of CHF (congestive heart failure), History of MI (myocardial infarction), History of ovarian cyst, History of peritonitis, HTN (hypertension), Hx of blood clots, Hyperlipidemia, Intestinal or peritoneal adhesions with obstruction (postoperative) (postinfection) ([address] Kidney infection, Lateral epicondylitis  of elbow, MDRO (multiple drug resistant organisms) resistance, Muscle strain, Other abnormal glucose, PONV (postoperative nausea and vomiting), Pre-diabetes, Restless legs syndrome (RLS), Snores, 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[address] TIA (transient ischemic attack), Uses walker, Vitamin D deficiency, Wears glasses, and Wellness examination. Social History:   reports that she quit smoking about 4 years ago. Her smoking use included cigarettes. She started smoking about 26 years ago. She has a 10.00 pack-year smoking history. She has never used smokeless tobacco. She reports that she does not drink alcohol and does not use drugs. Family History:   Family History   Problem Relation Age of Onset    Stroke Mother     Diabetes Mother     Heart Disease Mother     High Blood Pressure Mother     Heart Disease Father     Heart Disease Brother     High Blood Pressure Brother     Heart Disease Maternal Grandmother     High Blood Pressure Sister        Vitals:  BP (!) 129/52   Pulse 75   Temp 98.2 °F (36.8 °C)   Resp 18   Ht 5' 3\" (1.6 m)   Wt 180 lb 5.4 oz (81.8 kg)   SpO2 95%   BMI 31.95 kg/m²   Temp (24hrs), Av °F (37.2 °C), Min:98.2 °F (36.8 °C), Max:99.8 °F (37.7 °C)    No results for input(s): POCGLU in the last 72 hours. I/O (24Hr):     Intake/Output Summary (Last 24 hours) at 3/18/2022 1832  Last data filed at 3/18/2022 1823  Gross per 24 hour   Intake 735 ml   Output 1400 ml   Net -665 ml       Labs:    Lab Results   Component Value Date    WBC 7.3 03/18/2022    HGB 11.1 (L) 03/18/2022    HCT 33.2 (L) 03/18/2022    MCV 94.0 03/18/2022     03/18/2022     Lab Results   Component Value Date     03/18/2022    K 3.6 03/18/2022     03/18/2022    CO2 26 03/18/2022    BUN 26 03/18/2022    CREATININE 0.85 03/18/2022    GLUCOSE 112 03/18/2022    CALCIUM 8.8 03/18/2022          Lab Results   Component Value Date/Time    SPECIAL NOT REPORTED 05/21/2021 09:58 AM     Lab Results   Component Value Date/Time    CULTURE NO GROWTH 05/21/2021 09:58 AM         Radiology:    Recent data reviewed    Physical Examination:        General appearance:  alert, cooperative and no distress  Eyes: Anicteric sclera. Pupils are equally round and reactive to light. Extraocular movements are intact.   Lungs:  clear to auscultation bilaterally, normal effort  Heart:  regular rate and rhythm, no murmur  Abdomen:  soft, nontender, nondistended, normal bowel sounds, no masses, hepatomegaly, splenomegaly  Extremities:  no edema, redness, tenderness in the calves  Skin:  no gross lesions, rashes, induration  Neuro:  Alert, oriented X 3, no new focal weakness still has low back pain  Assessment:        Primary Problem  Ataxia    Active Hospital Problems    Diagnosis Date Noted    Cervical myelopathy ([facility] [G95.9] 03/17/2022    Frequent falls [R29.6]     Type 2 diabetes mellitus with circulatory disorder, without long-term current use of insulin ([address] [E11.59] 03/18/2019    Ataxia [R27.0] 08/05/2015    Peripheral edema [R60.9] 04/13/2015             Plan:        Presenting with multiple falls over last several weeks in the setting of chronic cervical spine stenosis with myelopathy status post cervical fusion follow neurosurgery  Recently started on Eliquis for acute DVT right leg, complaining of right leg pain  Trauma work-up CT brain negative in ER  Multiple remote and healing rib fractures noted  Uses walker at home  Neurology consulted for worsening gait instability-reset MRI thoracic spine did show T2-T3 and T5-T7 moderate neural foraminal narrowing  PT OT consult, will need placement     Her last fall was related to post void dizziness    Bilateral leg swelling, skin excoriation with surrounding redness/early cellulitis-no fever no leukocytosis-we will startabx    3/18  Awaiting MRI of lumbar spine appreciate neurology recommendations  Diuresing on IV Lasix -leg swelling is better  PT OT and PMNR consulted for discharge planning        Marycarmen Cano MD  3/18/2022  6:32 PM

## 2022-03-18 NOTE — PROGRESS NOTES
Dr. Gypsy Bloch perfect served regarding the pt. Needing a medication for anxiousness, during the MRI, that will be done today.

## 2022-03-18 NOTE — PLAN OF CARE
Problem: Pain:  Goal: Pain level will decrease  Description: Pain level will decrease  Outcome: Ongoing  Goal: Control of acute pain  Description: Control of acute pain  Outcome: Ongoing  Goal: Control of chronic pain  Description: Control of chronic pain  Outcome: Ongoing     Problem: Falls - Risk of:  Goal: Will remain free from falls  Description: Will remain free from falls  Outcome: Ongoing  Goal: Absence of physical injury  Description: Absence of physical injury  Outcome: Ongoing     Problem: Neurological  Goal: Maximum potential motor/sensory/cognitive function  Outcome: Ongoing

## 2022-03-18 NOTE — PROGRESS NOTES
Active problem Frequent falling episodes . Cervical myelopathy status post decompression with residual left side weakness . Rule out lumbar stenosis. The condition is  MRI lumbar spine  L4-5 prior posterior fusion with grade 1 to 2 anterolisthesis with laminectomy . L5-S1 bilateral laminectomies . She has history of prior L4-5 and L5-S1  fusion with laminectomy . 68 yo lady with falling . She present yesterday to [facility] ER with falling having had 2 falls this week with one of these landing on left neck  . She has history of cervical myelopathy seen neurosurgery KENDAL Ortiz having had prior C3-6 posterior fusion with laminectomies last year  . MRI cervical spine fron January 2022 posterior fixation C3 to C6 with multi level degenerative changes with no canal stenosis . She reports that for one year even before neck surgery she has had mild eft arm weakness dropping things with left hand along with left leg weakness with leg giveway along with left foot going out  . She has gait imbalance walking with walker with falling that can be as frequent as once per week . There is bilateral leg edema . There has been neck pain . MRI thoracic spine from March with normal cord with moderate to severe bilateral T2-T3 neural foraminal stenosis . Mild to moderate right T5-6 and T6-7  neural foraminal stenosis . Head CT this admission with mild chronic periventricular small vessel disease . CT cervical spine posterior metallic fusion K5-H8 with laminectomies. She is on eliquis 5 mg po bid  for DVT . She did walk 25 feet with rolling walker today with slow tawanda . Significant medications eliquis 5 mg po bid ,neurontin 100 mg po bid . Testing MRI cervical spine fron January posterior fixation C3 to C6 with multi level degenerative changes with no canal stenosis, January 2022 . MRI thoracic spine from March with normal cord with moderate to severe bilateral T2-T3 neural foraminal stenosis .  Mild to moderate right T5-6 and T6-7 neural foarminal stenosis , march 2022 Head CT this admission with mild chronic periventricular small vessel disease . CT cervical spine posterior metallic fusion E6-A3 with laminectomies. MRI lumbar spine  L4-5 prior posterior fusion with grade 1 to 2 anterolisthesis with laminectomy . L5-S1 bilateral laminectomies   Past Medical History:   Diagnosis Date    Allergic rhinitis, cause unspecified     Back pain     lumbar    Bowel obstruction (HCC)     history of due to scar tissue, resolved non-surgically    C. difficile diarrhea     CAD (coronary artery disease)     no stent needed per pt.  Dr. Phillip Bennett did cath at  2005    Cardiac murmur     Cellulitis     left leg    Cellulitis 2017 August    leg left leg/bug bite    Cerebral artery occlusion with cerebral infarction [facility])     TIA 2014    COVID-19     ONE YR AGO IN 4/25/2020 fever and cough    Diverticulosis of colon (without mention of hemorrhage)     GERD (gastroesophageal reflux disease)     GERD (gastroesophageal reflux disease)     on rx    History of blood transfusion     approx 2020        History of CHF (congestive heart failure)     History of MI (myocardial infarction) 2005    thought due to a blood clot    History of ovarian cyst 1970    had oopherectomy ohlly    History of peritonitis 1968    due to ruptured appendix age 13    HTN (hypertension)     Hx of blood clots     right leg    Hyperlipidemia     Intestinal or peritoneal adhesions with obstruction (postoperative) (postinfection) ([address]     Kidney infection     renal failure/sepsis/spider bite    Lateral epicondylitis  of elbow     MDRO (multiple drug resistant organisms) resistance     c diff    Muscle strain     right posterior shoulder    Other abnormal glucose     PONV (postoperative nausea and vomiting)     dry heaves    Pre-diabetes     Restless legs syndrome (RLS)     Snores     no cpap    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)     TIA (transient ischemic attack) 2014    Uses walker     Vitamin D deficiency     Wears glasses     Wellness examination     last seen 2 weeks ago       Past Surgical History:   Procedure Laterality Date    ABDOMEN SURGERY  1976    benign tumor removed near remaining ovary, 1.5 pounds    APPENDECTOMY  1968    appendix ruptured, developed peritonitis    BACK SURGERY      BUNIONECTOMY Left     along with calcium deposits removed   [address]  2005    negative    CERVICAL FUSION  05/21/2021    POSTERIOR C3-6 LAMINECTOMY, PARTIAL C7 LAMINECTOMY, FUSION C3-C6, SILVERCORD    CERVICAL FUSION N/A 5/21/2021    POSTERIOR C3-6 LAMINECTOMY, PARTIAL C7 LAMINECTOMY, FUSION C3-C6, SILVERCORD performed by Tianna Dixon DO at [address]    12 INCHES REMOVED D/T OBSTRUCTION    COLONOSCOPY      CYST REMOVAL Right     right facial    HYSTERECTOMY  1973    taken as a result of recurring cysts    LUMBAR FUSION N/A 02/10/2020    LUMBAR L4-5 POSTERIOR  DECOMPRESSION INSTRUMENTATION FUSION WCEMENT AUGMENTATION/ performed by Jamison Morgan MD at [place] [address] N/A 06/17/2020    L5-S1 PLIF L4-L5 REVISION performed by Jamison Morgan MD at [place] [id number]  08/14/2014    [address]    UNILATERAL due to cyst    OVARY REMOVAL  1971    partial, due to cyst   Steve Layer SINUS SURGERY  2004    UPPER GASTROINTESTINAL ENDOSCOPY N/A 05/31/2019    EGD ESOPHAGOGASTRODUODENOSCOPY performed by Fransisca Scruggs MD at [address] [facility] N/A 08/05/2019    EGD BIOPSY performed by Joselin Schmitz MD at [facility] N/A 08/23/2019    EGD BIOPSY performed by Fransisca Scruggs MD at [address] [facility] 03/05/2019    WRIST OPEN REDUCTION INTERNAL FIXATION performed by Oscar Saucedo MD at [address]       Family History   Problem Relation Age of Onset    Stroke Mother     Diabetes Mother     Heart Disease Mother     High Blood Pressure Mother     Heart Disease Father     Heart Disease Brother     High Blood Pressure Brother     Heart Disease Maternal Grandmother     High Blood Pressure Sister        Social History     Socioeconomic History    Marital status:      Spouse name: None    Number of children: None    Years of education: None    Highest education level: None   Occupational History    Occupation: retired   Tobacco Use    Smoking status: Former Smoker     Packs/day: 0.50     Years: 20.00     Pack years: 10.00     Types: Cigarettes     Start date: 1995     Quit date: 2017     Years since quittin.7    Smokeless tobacco: Never Used   Vaping Use    Vaping Use: Never used   Substance and Sexual Activity    Alcohol use: No     Alcohol/week: 0.0 standard drinks    Drug use: No    Sexual activity: None   Other Topics Concern    None   Social History Narrative    None     Social Determinants of Health     Financial Resource Strain: Low Risk     Difficulty of Paying Living Expenses: Not hard at all   Food Insecurity: No Food Insecurity    Worried About Running Out of Food in the Last Year: Never true    Raghavendra of Food in the Last Year: Never true   Transportation Needs:     Lack of Transportation (Medical): Not on file    Lack of Transportation (Non-Medical):  Not on file   Physical Activity:     Days of Exercise per Week: Not on file    Minutes of Exercise per Session: Not on file   Stress:     Feeling of Stress : Not on file   Social Connections:     Frequency of Communication with Friends and Family: Not on file    Frequency of Social Gatherings with Friends and Family: Not on file    Attends Mu-ism Services: Not on file    Active Member of Clubs or Organizations: Not on file    Attends Club or Organization Meetings: Not on file    Marital Status: Not on file   Intimate Partner Violence:     Fear of Current or Ex-Partner: Not on file    Emotionally Abused: Not on file    Physically Abused: Not on file    Sexually Abused: Not on file   Housing Stability:     Unable to Pay for Housing in the Last Year: Not on file    Number of Stew in the Last Year: Not on file    Unstable Housing in the Last Year: Not on file       Current Facility-Administered Medications   Medication Dose Route Frequency Provider Last Rate Last Admin    sodium chloride flush 0.9 % injection 5-40 mL  5-40 mL IntraVENous 2 times per day Yahir Mcfarlane, APRN - CNP   10 mL at 03/18/22 0837    sodium chloride flush 0.9 % injection 10 mL  10 mL IntraVENous PRN Yahir Mcfarlane, APRN - CNP        0.9 % sodium chloride infusion  25 mL IntraVENous PRN Yahir Mcfarlane, APRN - CNP        ondansetron (ZOFRAN-ODT) disintegrating tablet 4 mg  4 mg Oral Q8H PRN Yahir Mcfarlane, APRN - CNP   4 mg at 03/18/22 6290    Or    ondansetron (ZOFRAN) injection 4 mg  4 mg IntraVENous Q6H PRN Yahir Mcfarlane, APRN - CNP        magnesium hydroxide (MILK OF MAGNESIA) 400 MG/5ML suspension 30 mL  30 mL Oral Daily PRN Yahir Mcfarlane, APRN - CNP        acetaminophen (TYLENOL) tablet 650 mg  650 mg Oral Q6H PRN Yahir Mcfarlane, APRN - CNP   650 mg at 03/18/22 1310    Or    acetaminophen (TYLENOL) suppository 650 mg  650 mg Rectal Q6H PRN Yahir Mcfarlane, APRN - CNP        amLODIPine (NORVASC) tablet 10 mg  10 mg Oral Daily Yahir Mcfarlane, APRN - CNP   10 mg at 03/18/22 6641    apixaban (ELIQUIS) tablet 5 mg  5 mg Oral BID Yahir Mcfarlane, APRN - CNP   5 mg at 03/18/22 0836    atorvastatin (LIPITOR) tablet 40 mg  40 mg Oral Nightly Yahir Mcfarlane, APRN - CNP   40 mg at 03/17/22 2041    carvedilol (COREG) tablet 12.5 mg  12.5 mg Oral BID Yahir Mcfarlane, APRN - CNP   12.5 mg at 03/18/22 0834    citalopram (CELEXA) tablet 20 mg  20 mg Oral Daily Yahir Mcfarlane, APRN - CNP   20 mg at 03/18/22 7468    vitamin B-12 (CYANOCOBALAMIN) tablet 1,000 mcg  1,000 mcg Oral Daily Yahir Mcfarlane, APRN - CNP   1,000 mcg at 03/18/22 0836    docusate sodium (COLACE) capsule 100 mg  100 mg Oral BID PRN Arebobbya Yellow [employer], APRN - CNP   100 mg at 03/17/22 0106    fenofibrate (TRIGLIDE) tablet 160 mg  160 mg Oral Daily Aretta Chas, APRN - CNP   160 mg at 03/18/22 1542    ferrous sulfate (IRON 325) tablet 325 mg  325 mg Oral BID Aretta Yellow [employer], APRN - CNP   325 mg at 03/18/22 6939    lisinopril (PRINIVIL;ZESTRIL) tablet 30 mg  30 mg Oral Daily Aretta Chas, APRN - CNP   30 mg at 03/18/22 7103    pramipexole (MIRAPEX) tablet 0.5 mg  0.5 mg Oral Nightly Aretta Yellow Medicine, APRN - CNP   0.5 mg at 03/17/22 2045    furosemide (LASIX) injection 40 mg  40 mg IntraVENous BID Aretta Chas, APRN - CNP   40 mg at 03/18/22 1269    calcium carbonate w/vitamin D (CALTRATE) 600-400 MG-UNIT per tab 1 tablet  1 tablet Oral Daily Arebobbya Chas, APRN - CNP   1 tablet at 03/18/22 3294    cephALEXin (KEFLEX) capsule 500 mg  500 mg Oral 2 times per day Tiarra Dotson MD   500 mg at 03/18/22 7649    gabapentin (NEURONTIN) capsule 100 mg  100 mg Oral BID Nora Jerome MD   100 mg at 03/18/22 3804    sodium chloride flush 0.9 % injection 10 mL  10 mL IntraVENous PRN Iban Garcia MD   10 mL at 03/16/22 2141       Allergies   Allergen Reactions    Bactrim [Sulfamethoxazole-Trimethoprim] Other (See Comments)     sepsis    Codeine Itching    Seasonal        ROS:   Constitutional                  Negative for fever and chills   HEENT                            Negative for ear discharge, ear pain, nosebleed  Eyes                                Negative for photophobia, pain and discharge  Respiratory                      Negative for hemoptysis and sputum  Cardiovascular                Negative for orthopnea, claudication and PND  Gastrointestinal               Negative for abdominal pain, diarrhea, blood in stool  Musculoskeletal               Negative for joint pain, negative for myalgia  Skin Negative for rash or itching  Endo/heme/allergies       Negative for polydipsia, environmental allergy  Psychiatric                       Negative for suicidal ideation. Patient is not anxious    Vitals:    03/18/22 1359   BP: 127/65   Pulse: 71   Resp: 16   Temp: 99.3 °F (37.4 °C)   SpO2: 95%     Admission weight: 190 lb 7.6 oz (86.4 kg)    Neurological Examination  Constitutional .General exam well groomed   Head/ Ears /Nose/Throat/external ear . Normal exam  Neck and thyroid . Normal size. No bruits  Respiratory . Breathsounds clear bilaterally  Cardiovascular: Auscultation of heart with regular rate and rhythm   Musculoskeletal. Muscle bulk and tone normal                                                           Muscle strength left ADF and EHL 4+/5 otherwise 5/5 strength throughout                                                                                No dysmetria or dysdiadokinesis  No tremor   Normal fine motor  Orientation Alert and oriented x 3   Attention and concentration normal  Short term memory normal  Language process and speech normal . No aphasia   Cranial nerve 2 normal acuety and visual fields  Cranial nerve 3, 4 and 6 . Extraocular muscles are intact . Pupils are equal and reactive   Cranial nerve 5 . Intact corneal reflex. Normal facial sensation  Cranial nerve 7 normal exam   Cranial nerve 8. Grossly intact hearing   Cranial nerve 9 and 10. Symmetric palate elevation   Cranial nerve 11 , 5 out of 5 strength   Cranial Nerve 12 midline tongue . No atrophy  Sensation . Normal pinprick and light touch   Deep Tendon Reflexes normal  Plantar response flexor bilaterally    Assessment :    Frequent falling episodes . Cervical myelopathy status post decompression with residual left side weakness     Plan:      Will need rehabilitation

## 2022-03-18 NOTE — PROGRESS NOTES
[address]   OCCUPATIONAL THERAPY MISSED TREATMENT NOTE   INPATIENT   Date: 3/18/22  Patient Name: Vanessa Leroy       Room: 7820/0169-86  MRN: [id number]   Account #: [de-identified]    : 1950  (71 y.o.)  Gender: female   Referring Practitioner: Zain Ambrose MD  Diagnosis: Ataxia             REASON FOR MISSED TREATMENT:  Patient at testing and/or off the floor   -   pt transported down for MRI at this time. will contiunue to follow for OT needs.        SHWETA Chaudhari

## 2022-03-18 NOTE — PROGRESS NOTES
Bedside reporting done, per Denis Pretty and Avril Rn's  IV intact, no redness noted,  IV  INT'd  Bed alarm on  Pt.  Has no complaints at this time  HOB up  @  50 degrees

## 2022-03-19 LAB
ANION GAP SERPL CALCULATED.3IONS-SCNC: 13 MMOL/L (ref 9–17)
BUN BLDV-MCNC: 32 MG/DL (ref 8–23)
CALCIUM SERPL-MCNC: 8.9 MG/DL (ref 8.6–10.4)
CHLORIDE BLD-SCNC: 103 MMOL/L (ref 98–107)
CO2: 26 MMOL/L (ref 20–31)
CREAT SERPL-MCNC: 0.94 MG/DL (ref 0.5–0.9)
GFR AFRICAN AMERICAN: >60 ML/MIN
GFR NON-AFRICAN AMERICAN: 59 ML/MIN
GFR SERPL CREATININE-BSD FRML MDRD: ABNORMAL ML/MIN/{1.73_M2}
GLUCOSE BLD-MCNC: 105 MG/DL (ref 70–99)
HCT VFR BLD CALC: 34.6 % (ref 36–46)
HEMOGLOBIN: 11.4 G/DL (ref 12–16)
MCH RBC QN AUTO: 31.3 PG (ref 26–34)
MCHC RBC AUTO-ENTMCNC: 33 G/DL (ref 31–37)
MCV RBC AUTO: 94.9 FL (ref 80–100)
PDW BLD-RTO: 14.1 % (ref 11.5–14.9)
PLATELET # BLD: 347 K/UL (ref 150–450)
PMV BLD AUTO: 7.1 FL (ref 6–12)
POTASSIUM SERPL-SCNC: 4.2 MMOL/L (ref 3.7–5.3)
RBC # BLD: 3.64 M/UL (ref 4–5.2)
SODIUM BLD-SCNC: 142 MMOL/L (ref 135–144)
WBC # BLD: 8 K/UL (ref 3.5–11)

## 2022-03-19 PROCEDURE — 36415 COLL VENOUS BLD VENIPUNCTURE: CPT

## 2022-03-19 PROCEDURE — 99225 PR SBSQ OBSERVATION CARE/DAY 25 MINUTES: CPT | Performed by: PSYCHIATRY & NEUROLOGY

## 2022-03-19 PROCEDURE — G0378 HOSPITAL OBSERVATION PER HR: HCPCS

## 2022-03-19 PROCEDURE — 6370000000 HC RX 637 (ALT 250 FOR IP): Performed by: PSYCHIATRY & NEUROLOGY

## 2022-03-19 PROCEDURE — 6370000000 HC RX 637 (ALT 250 FOR IP): Performed by: NURSE PRACTITIONER

## 2022-03-19 PROCEDURE — 96376 TX/PRO/DX INJ SAME DRUG ADON: CPT

## 2022-03-19 PROCEDURE — 99225 PR SBSQ OBSERVATION CARE/DAY 25 MINUTES: CPT | Performed by: INTERNAL MEDICINE

## 2022-03-19 PROCEDURE — 80048 BASIC METABOLIC PNL TOTAL CA: CPT

## 2022-03-19 PROCEDURE — 6360000002 HC RX W HCPCS: Performed by: NURSE PRACTITIONER

## 2022-03-19 PROCEDURE — 6370000000 HC RX 637 (ALT 250 FOR IP): Performed by: INTERNAL MEDICINE

## 2022-03-19 PROCEDURE — 85027 COMPLETE CBC AUTOMATED: CPT

## 2022-03-19 PROCEDURE — 2580000003 HC RX 258: Performed by: NURSE PRACTITIONER

## 2022-03-19 RX ADMIN — SODIUM CHLORIDE, PRESERVATIVE FREE 10 ML: 5 INJECTION INTRAVENOUS at 21:46

## 2022-03-19 RX ADMIN — FERROUS SULFATE TAB 325 MG (65 MG ELEMENTAL FE) 325 MG: 325 (65 FE) TAB at 21:46

## 2022-03-19 RX ADMIN — ACETAMINOPHEN 650 MG: 325 TABLET, FILM COATED ORAL at 21:39

## 2022-03-19 RX ADMIN — CEPHALEXIN 500 MG: 500 CAPSULE ORAL at 21:39

## 2022-03-19 RX ADMIN — PRAMIPEXOLE DIHYDROCHLORIDE 0.5 MG: 0.25 TABLET ORAL at 21:46

## 2022-03-19 RX ADMIN — CALCIUM CARBONATE 600 MG (1,500 MG)-VITAMIN D3 400 UNIT TABLET 1 TABLET: at 09:15

## 2022-03-19 RX ADMIN — CEPHALEXIN 500 MG: 500 CAPSULE ORAL at 09:15

## 2022-03-19 RX ADMIN — CYANOCOBALAMIN TAB 1000 MCG 1000 MCG: 1000 TAB at 09:15

## 2022-03-19 RX ADMIN — CARVEDILOL 12.5 MG: 12.5 TABLET, FILM COATED ORAL at 09:15

## 2022-03-19 RX ADMIN — APIXABAN 5 MG: 5 TABLET, FILM COATED ORAL at 21:40

## 2022-03-19 RX ADMIN — METRONIDAZOLE 100 MG: 500 TABLET ORAL at 21:39

## 2022-03-19 RX ADMIN — CITALOPRAM HYDROBROMIDE 20 MG: 20 TABLET ORAL at 09:15

## 2022-03-19 RX ADMIN — FENOFIBRATE 160 MG: 160 TABLET ORAL at 09:15

## 2022-03-19 RX ADMIN — ATORVASTATIN CALCIUM 40 MG: 40 TABLET, FILM COATED ORAL at 21:40

## 2022-03-19 RX ADMIN — FUROSEMIDE 40 MG: 10 INJECTION, SOLUTION INTRAMUSCULAR; INTRAVENOUS at 18:44

## 2022-03-19 RX ADMIN — CARVEDILOL 12.5 MG: 12.5 TABLET, FILM COATED ORAL at 21:39

## 2022-03-19 RX ADMIN — METRONIDAZOLE 100 MG: 500 TABLET ORAL at 09:15

## 2022-03-19 RX ADMIN — APIXABAN 5 MG: 5 TABLET, FILM COATED ORAL at 09:15

## 2022-03-19 RX ADMIN — AMLODIPINE BESYLATE 10 MG: 5 TABLET ORAL at 09:15

## 2022-03-19 RX ADMIN — ACETAMINOPHEN 650 MG: 325 TABLET, FILM COATED ORAL at 07:45

## 2022-03-19 RX ADMIN — FERROUS SULFATE TAB 325 MG (65 MG ELEMENTAL FE) 325 MG: 325 (65 FE) TAB at 08:15

## 2022-03-19 RX ADMIN — FUROSEMIDE 40 MG: 10 INJECTION, SOLUTION INTRAMUSCULAR; INTRAVENOUS at 09:15

## 2022-03-19 RX ADMIN — LISINOPRIL 30 MG: 20 TABLET ORAL at 09:15

## 2022-03-19 ASSESSMENT — PAIN DESCRIPTION - LOCATION
LOCATION: CHEST;NECK
LOCATION: BACK
LOCATION: CHEST;NECK

## 2022-03-19 ASSESSMENT — PAIN SCALES - GENERAL
PAINLEVEL_OUTOF10: 0
PAINLEVEL_OUTOF10: 8
PAINLEVEL_OUTOF10: 3
PAINLEVEL_OUTOF10: 0

## 2022-03-19 ASSESSMENT — PAIN DESCRIPTION - PAIN TYPE
TYPE: ACUTE PAIN
TYPE: ACUTE PAIN
TYPE: CHRONIC PAIN

## 2022-03-19 NOTE — FLOWSHEET NOTE
PT vented her emotions when writer asked PT to things to pray for her. PT cried over her illness during the prayer as well.     03/19/22 1351   Encounter Summary   Services provided to: Patient   Referral/Consult From: [facility]   Support System Spouse; Children   Continue Visiting   (3-19-22)   Complexity of Encounter Moderate   Length of Encounter 30 minutes   Spiritual Assessment Completed Yes   Spiritual/Congregational   Type Spiritual support   Assessment Calm; Approachable;Coping;Peaceful   Intervention Active listening;Explored feelings, thoughts, concerns;Prayer;Sustaining presence/ Ministry of presence; Discussed illness/injury and it's impact   Outcome Expressed gratitude;Engaged in conversation;Venting emotion;Receptive;Encouraged; Tearful;Coping;Expressed feelings/needs/concerns

## 2022-03-19 NOTE — PROGRESS NOTES
[place] [address] Internal Medicine    Progress Note    3/19/2022    1:03 PM    Name:   Ac Price  MRN:     [id number]     Acct:      [de-identified]   Room:   2073/2073-01   Day:  0  Admit Date:  3/16/2022  8:17 PM    PCP:   Ronda Hendrickson MD  Code Status:  Full Code    Subjective:     C/C:   Chief Complaint   Patient presents with    Leg Pain    Shortness of Breath    Fall    Neck Pain         Interval History Status: Improving    HPI:     See HPI    Review of Systems:     Denies any shortness of breath or cough  Denies chest pain or palpitations  Denies abdominal pain, diarrhea vomiting  Denies any new numbness tremors or weakness. Medications: Allergies:     Allergies   Allergen Reactions    Bactrim [Sulfamethoxazole-Trimethoprim] Other (See Comments)     sepsis    Codeine Itching    Seasonal        Current Meds:   Scheduled Meds:    sodium chloride flush  5-40 mL IntraVENous 2 times per day    amLODIPine  10 mg Oral Daily    apixaban  5 mg Oral BID    atorvastatin  40 mg Oral Nightly    carvedilol  12.5 mg Oral BID    citalopram  20 mg Oral Daily    cyanocobalamin  1,000 mcg Oral Daily    fenofibrate  160 mg Oral Daily    ferrous sulfate  325 mg Oral BID    lisinopril  30 mg Oral Daily    pramipexole  0.5 mg Oral Nightly    furosemide  40 mg IntraVENous BID    calcium carbonate w/vitamin D  1 tablet Oral Daily    cephALEXin  500 mg Oral 2 times per day    gabapentin  100 mg Oral BID     Continuous Infusions:    sodium chloride       PRN Meds: potassium chloride **OR** potassium alternative oral replacement **OR** potassium chloride, sodium chloride flush, sodium chloride, ondansetron **OR** ondansetron, magnesium hydroxide, acetaminophen **OR** acetaminophen, docusate sodium, sodium chloride flush    Data:     Past Medical History:   has a past medical history of Allergic rhinitis, cause unspecified, Back pain, Bowel obstruction (HCC), C. difficile diarrhea, CAD (coronary artery disease), Cardiac murmur, Cellulitis, Cellulitis, Cerebral artery occlusion with cerebral infarction ([address] COVID-19, Diverticulosis of colon (without mention of hemorrhage), GERD (gastroesophageal reflux disease), GERD (gastroesophageal reflux disease), History of blood transfusion, History of CHF (congestive heart failure), History of MI (myocardial infarction), History of ovarian cyst, History of peritonitis, HTN (hypertension), Hx of blood clots, Hyperlipidemia, Intestinal or peritoneal adhesions with obstruction (postoperative) (postinfection) ([address] Kidney infection, Lateral epicondylitis  of elbow, MDRO (multiple drug resistant organisms) resistance, Muscle strain, Other abnormal glucose, PONV (postoperative nausea and vomiting), Pre-diabetes, Restless legs syndrome (RLS), Snores, 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[address] TIA (transient ischemic attack), Uses walker, Vitamin D deficiency, Wears glasses, and Wellness examination. Social History:   reports that she quit smoking about 4 years ago. Her smoking use included cigarettes. She started smoking about 26 years ago. She has a 10.00 pack-year smoking history. She has never used smokeless tobacco. She reports that she does not drink alcohol and does not use drugs. Family History:   Family History   Problem Relation Age of Onset    Stroke Mother     Diabetes Mother     Heart Disease Mother     High Blood Pressure Mother     Heart Disease Father     Heart Disease Brother     High Blood Pressure Brother     Heart Disease Maternal Grandmother     High Blood Pressure Sister        Vitals:  BP (!) 129/52   Pulse 75   Temp 98.2 °F (36.8 °C)   Resp 18   Ht 5' 3\" (1.6 m)   Wt 180 lb 5.4 oz (81.8 kg)   SpO2 95%   BMI 31.95 kg/m²   Temp (24hrs), Av.8 °F (37.1 °C), Min:98.2 °F (36.8 °C), Max:99.3 °F (37.4 °C)    No results for input(s): POCGLU in the last 72 hours. I/O (24Hr):     Intake/Output Summary (Last 24 hours) at 3/19/2022 1303  Last data filed at 3/19/2022 1205  Gross per 24 hour   Intake 325 ml   Output 2800 ml   Net -2475 ml       Labs:    Lab Results   Component Value Date    WBC 8.0 03/19/2022    HGB 11.4 (L) 03/19/2022    HCT 34.6 (L) 03/19/2022    MCV 94.9 03/19/2022     03/19/2022     Lab Results   Component Value Date     03/19/2022    K 4.2 03/19/2022     03/19/2022    CO2 26 03/19/2022    BUN 32 03/19/2022    CREATININE 0.94 03/19/2022    GLUCOSE 105 03/19/2022    CALCIUM 8.9 03/19/2022          Lab Results   Component Value Date/Time    SPECIAL NOT REPORTED 05/21/2021 09:58 AM     Lab Results   Component Value Date/Time    CULTURE NO GROWTH 05/21/2021 09:58 AM         Radiology:    Recent data reviewed    Physical Examination:        General appearance:  alert, cooperative and no distress  Eyes: Anicteric sclera. Pupils are equally round and reactive to light. Extraocular movements are intact.   Lungs:  clear to auscultation bilaterally, normal effort  Heart:  regular rate and rhythm, no murmur  Abdomen:  soft, nontender, nondistended, normal bowel sounds, no masses, hepatomegaly, splenomegaly  Extremities:  no edema, redness, tenderness in the calves  Skin:  no gross lesions, rashes, induration  Neuro:  Alert, oriented X 3, no new focal weakness still has low back pain  Assessment:        Primary Problem  Ataxia    Active Hospital Problems    Diagnosis Date Noted    Cervical myelopathy ([facility] [G95.9] 03/17/2022    Frequent falls [R29.6]     Type 2 diabetes mellitus with circulatory disorder, without long-term current use of insulin ([address] [E11.59] 03/18/2019    Ataxia [R27.0] 08/05/2015    Peripheral edema [R60.9] 04/13/2015             Plan:        Presenting with multiple falls over last several weeks in the setting of chronic cervical spine stenosis with myelopathy status post cervical fusion follow neurosurgery  Recently started on Eliquis for acute DVT right leg, complaining of right leg pain  Trauma work-up CT brain negative in ER  Multiple remote and healing rib fractures noted  Uses walker at home  Neurology consulted for worsening gait instability-reset MRI thoracic spine did show T2-T3 and T5-T7 moderate neural foraminal narrowing  PT OT consult, will need placement     Her last fall was related to post void dizziness    Bilateral leg swelling, skin excoriation with surrounding redness/early cellulitis-no fever no leukocytosis-we will startabx    3/18  Awaiting MRI of lumbar spine appreciate neurology recommendations  Diuresing on IV Lasix -leg swelling is better  PT OT and PMNR consulted for discharge planning    3/19  Mild chronic changes on MRI  Awaiting placement to rehab    Colonel Shawnee MD  3/19/2022  1:03 PM

## 2022-03-19 NOTE — PROGRESS NOTES
Bedside rounding done, per Janneth Holland and Carmencita Severance RN's  IV intact, no redness noted  INT'd  Bed alarm on  Pt.  Has no c/o of pain or SOB at this time

## 2022-03-19 NOTE — CARE COORDINATION
ONGOING DISCHARGE PLAN:    PM&R consult placed for referral to ARU. SW to follow with ARU. Pt did have [address] in the past and would be agreeable if needed. Heidi Jj Will continue to follow for additional discharge needs.     Electronically signed by Lashell Santana RN on 3/19/2022 at 1:45 PM

## 2022-03-19 NOTE — PROGRESS NOTES
Active problem Frequent falling episodes . Cervical myelopathy status post decompression with residual left side weakness . Rule out lumbar stenosis. The condition is  MRI lumbar spine  L4-5 prior posterior fusion with grade 1 to 2 anterolisthesis with laminectomy . L5-S1 bilateral laminectomies . She has history of prior L4-5 and L5-S1  fusion with laminectomy . She did walk 25 feet with rolling walker today with slow tawanda in PT,  70 yo lady with falling . She presented to [facility] ER with falling having had 2 falls this week with one of these landing on left neck  . She has history of cervical myelopathy [facility] having had prior C3-6 posterior fusion with laminectomies last year  . MRI cervical spine fron January 2022 posterior fixation C3 to C6 with multi level degenerative changes with no canal stenosis . She reports that for one year even before neck surgery she has had mild eft arm weakness dropping things with left hand along with left leg weakness with leg giveway along with left foot going out  . She has gait imbalance walking with walker with falling that can be as frequent as once per week . There is bilateral leg edema . There has been neck pain . MRI thoracic spine from March with normal cord with moderate to severe bilateral T2-T3 neural foraminal stenosis . Mild to moderate right T5-6 and T6-7  neural foraminal stenosis . Head CT this admission with mild chronic periventricular small vessel disease . CT cervical spine posterior metallic fusion V3-C0 with laminectomies. She is on eliquis 5 mg po bid  for DVT . Significant medications eliquis 5 mg po bid ,neurontin 100 mg po bid . Testing MRI cervical spine fron January posterior fixation C3 to C6 with multi level degenerative changes with no canal stenosis, January 2022 . MRI thoracic spine from March with normal cord with moderate to severe bilateral T2-T3 neural foraminal stenosis .  Mild to moderate right T5-6 and T6-7 neural foarminal stenosis , march 2022 Head CT this admission with mild chronic periventricular small vessel disease . CT cervical spine posterior metallic fusion L3-B5 with laminectomies. MRI lumbar spine  L4-5 prior posterior fusion with grade 1 to 2 anterolisthesis with laminectomy . L5-S1 bilateral laminectomies   Past Medical History:   Diagnosis Date    Allergic rhinitis, cause unspecified     Back pain     lumbar    Bowel obstruction (HCC)     history of due to scar tissue, resolved non-surgically    C. difficile diarrhea     CAD (coronary artery disease)     no stent needed per pt.  Dr. Tylor Montalvo did cath at  2005    Cardiac murmur     Cellulitis     left leg    Cellulitis 2017 August    leg left leg/bug bite    Cerebral artery occlusion with cerebral infarction [facility])     TIA 2014    COVID-19     ONE YR AGO IN 4/25/2020 fever and cough    Diverticulosis of colon (without mention of hemorrhage)     GERD (gastroesophageal reflux disease)     GERD (gastroesophageal reflux disease)     on rx    History of blood transfusion     approx 2020        History of CHF (congestive heart failure)     History of MI (myocardial infarction) 2005    thought due to a blood clot    History of ovarian cyst 1970    had oopherectomy holly    History of peritonitis 1968    due to ruptured appendix age 13    HTN (hypertension)     Hx of blood clots     right leg    Hyperlipidemia     Intestinal or peritoneal adhesions with obstruction (postoperative) (postinfection) ([facility] [address]     Kidney infection     renal failure/sepsis/spider bite    Lateral epicondylitis  of elbow     MDRO (multiple drug resistant organisms) resistance     c diff    Muscle strain     right posterior shoulder    Other abnormal glucose     PONV (postoperative nausea and vomiting)     dry heaves    Pre-diabetes     Restless legs syndrome (RLS)     Snores     no cpap    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)     TIA (transient ischemic attack) 2014    Uses walker     Vitamin D deficiency     Wears glasses     Wellness examination     last seen 2 weeks ago       Past Surgical History:   Procedure Laterality Date    ABDOMEN SURGERY  1976    benign tumor removed near remaining ovary, 1.5 pounds    APPENDECTOMY  1968    appendix ruptured, developed peritonitis    BACK SURGERY      BUNIONECTOMY Left     along with calcium deposits removed   [address]  2005    negative    CERVICAL FUSION  05/21/2021    POSTERIOR C3-6 LAMINECTOMY, PARTIAL C7 LAMINECTOMY, FUSION C3-C6, SILVERCORD    CERVICAL FUSION N/A 5/21/2021    POSTERIOR C3-6 LAMINECTOMY, PARTIAL C7 LAMINECTOMY, FUSION C3-C6, SILVERCORD performed by Jewels Lancaster DO at [address]    12 INCHES REMOVED D/T OBSTRUCTION    COLONOSCOPY      CYST REMOVAL Right     right facial    HYSTERECTOMY  1973    taken as a result of recurring cysts    LUMBAR FUSION N/A 02/10/2020    LUMBAR L4-5 POSTERIOR  DECOMPRESSION INSTRUMENTATION FUSION WCEMENT AUGMENTATION/ performed by Arminda Watt MD at [place] [address] N/A 06/17/2020    L5-S1 PLIF L4-L5 REVISION performed by Arminda Watt MD at [address]  08/14/2014    [address]    UNILATERAL due to cyst    OVARY REMOVAL  1971    partial, due to cyst   Monique Saliva SINUS SURGERY  2004    UPPER GASTROINTESTINAL ENDOSCOPY N/A 05/31/2019    EGD ESOPHAGOGASTRODUODENOSCOPY performed by Senait Garvin MD at [address] [facility] N/A 08/05/2019    EGD BIOPSY performed by Maru Vincent MD at [place] [address] N/A 08/23/2019    EGD BIOPSY performed by Senait Garvin MD at [address] [facility] 03/05/2019    WRIST OPEN REDUCTION INTERNAL FIXATION performed by Preeti Mead MD at [address]       Family History   Problem Relation Age of Onset    Stroke Mother     Diabetes Mother     Heart Disease Mother     High Blood Pressure Mother     Heart Disease Father     Heart Disease Brother     High Blood Pressure Brother     Heart Disease Maternal Grandmother     High Blood Pressure Sister        Social History     Socioeconomic History    Marital status:      Spouse name: None    Number of children: None    Years of education: None    Highest education level: None   Occupational History    Occupation: retired   Tobacco Use    Smoking status: Former Smoker     Packs/day: 0.50     Years: 20.00     Pack years: 10.00     Types: Cigarettes     Start date: 1995     Quit date: 2017     Years since quittin.7    Smokeless tobacco: Never Used   Vaping Use    Vaping Use: Never used   Substance and Sexual Activity    Alcohol use: No     Alcohol/week: 0.0 standard drinks    Drug use: No    Sexual activity: None   Other Topics Concern    None   Social History Narrative    None     Social Determinants of Health     Financial Resource Strain: Low Risk     Difficulty of Paying Living Expenses: Not hard at all   Food Insecurity: No Food Insecurity    Worried About Running Out of Food in the Last Year: Never true    Raghavendra of Food in the Last Year: Never true   Transportation Needs:     Lack of Transportation (Medical): Not on file    Lack of Transportation (Non-Medical):  Not on file   Physical Activity:     Days of Exercise per Week: Not on file    Minutes of Exercise per Session: Not on file   Stress:     Feeling of Stress : Not on file   Social Connections:     Frequency of Communication with Friends and Family: Not on file    Frequency of Social Gatherings with Friends and Family: Not on file    Attends Congregation Services: Not on file    Active Member of Clubs or Organizations: Not on file    Attends Club or Organization Meetings: Not on file    Marital Status: Not on file   Intimate Partner Violence:     Fear of Current or Ex-Partner: Not on file    Emotionally Abused: Not on file    Physically Abused: Not on file    Sexually Abused: Not on file   Housing Stability:     Unable to Pay for Housing in the Last Year: Not on file    Number of Stew in the Last Year: Not on file    Unstable Housing in the Last Year: Not on file       Current Facility-Administered Medications   Medication Dose Route Frequency Provider Last Rate Last Admin    potassium chloride (KLOR-CON M) extended release tablet 40 mEq  40 mEq Oral PRN Marycarmen Cano MD        Or    potassium bicarb-citric acid (EFFER-K) effervescent tablet 40 mEq  40 mEq Oral PRN Marycarmen Cano MD        Or    potassium chloride 10 mEq/100 mL IVPB (Peripheral Line)  10 mEq IntraVENous PRN Marycarmen Cano MD        sodium chloride flush 0.9 % injection 5-40 mL  5-40 mL IntraVENous 2 times per day Graham Carty APRN - CNP   10 mL at 03/18/22 1939    sodium chloride flush 0.9 % injection 10 mL  10 mL IntraVENous PRN Graham Carty APRN - CNP        0.9 % sodium chloride infusion  25 mL IntraVENous PRN Graham Carty, APRN - CNP        ondansetron (ZOFRAN-ODT) disintegrating tablet 4 mg  4 mg Oral Q8H PRN Graham Carty, APRN - CNP   4 mg at 03/18/22 2022    Or    ondansetron (ZOFRAN) injection 4 mg  4 mg IntraVENous Q6H PRN Graham [facility], APRN - CNP        magnesium hydroxide (MILK OF MAGNESIA) 400 MG/5ML suspension 30 mL  30 mL Oral Daily PRN Graham Carty, APRN - CNP        acetaminophen (TYLENOL) tablet 650 mg  650 mg Oral Q6H PRN [place] [facility], APRN - CNP   650 mg at 03/19/22 0745    Or    acetaminophen (TYLENOL) suppository 650 mg  650 mg Rectal Q6H PRN Graham Carty, APRN - CNP        amLODIPine (NORVASC) tablet 10 mg  10 mg Oral Daily Graham Machelle, APRN - CNP   10 mg at 03/19/22 0915    apixaban (ELIQUIS) tablet 5 mg  5 mg Oral BID [place] [employer], APRN - CNP   5 mg at 03/19/22 0915    atorvastatin (LIPITOR) tablet 40 mg  40 mg Oral Nightly Graham [employer]ck, APRN - CNP   40 mg at 03/18/22 1938    carvedilol (COREG) tablet 12.5 mg  12.5 mg Oral BID Electa Span, APRN - CNP   12.5 mg at 03/19/22 0915    citalopram (CELEXA) tablet 20 mg  20 mg Oral Daily Electa Span, APRN - CNP   20 mg at 03/19/22 0915    vitamin B-12 (CYANOCOBALAMIN) tablet 1,000 mcg  1,000 mcg Oral Daily Electa Span, APRN - CNP   1,000 mcg at 03/19/22 0915    docusate sodium (COLACE) capsule 100 mg  100 mg Oral BID PRN Electa Span, APRN - CNP   100 mg at 03/17/22 0106    fenofibrate (TRIGLIDE) tablet 160 mg  160 mg Oral Daily Electa Span, APRN - CNP   160 mg at 03/19/22 0915    ferrous sulfate (IRON 325) tablet 325 mg  325 mg Oral BID Electa Span, APRN - CNP   325 mg at 03/19/22 0815    lisinopril (PRINIVIL;ZESTRIL) tablet 30 mg  30 mg Oral Daily Electa Span, APRN - CNP   30 mg at 03/19/22 0915    pramipexole (MIRAPEX) tablet 0.5 mg  0.5 mg Oral Nightly Electa Span, APRN - CNP   0.5 mg at 03/18/22 1939    furosemide (LASIX) injection 40 mg  40 mg IntraVENous BID Electa Span, APRN - CNP   40 mg at 03/19/22 0915    calcium carbonate w/vitamin D (CALTRATE) 600-400 MG-UNIT per tab 1 tablet  1 tablet Oral Daily Electa Span, APRN - CNP   1 tablet at 03/19/22 0915    cephALEXin (KEFLEX) capsule 500 mg  500 mg Oral 2 times per day Jason Duong MD   500 mg at 03/19/22 0915    gabapentin (NEURONTIN) capsule 100 mg  100 mg Oral BID Ines Hussein MD   100 mg at 03/19/22 0915    sodium chloride flush 0.9 % injection 10 mL  10 mL IntraVENous PRN Sohail Pineda MD   10 mL at 03/16/22 2141       Allergies   Allergen Reactions    Bactrim [Sulfamethoxazole-Trimethoprim] Other (See Comments)     sepsis    Codeine Itching    Seasonal        ROS:   Constitutional                  Negative for fever and chills   HEENT                            Negative for ear discharge, ear pain, nosebleed  Eyes                                Negative for photophobia, pain and discharge  Respiratory bilaterally    Assessment :    Frequent falling episodes . Cervical myelopathy status post decompression with residual left side weakness     Plan:     PMR consultation .  Will need rehabilitation

## 2022-03-20 LAB
ANION GAP SERPL CALCULATED.3IONS-SCNC: 15 MMOL/L (ref 9–17)
BUN BLDV-MCNC: 38 MG/DL (ref 8–23)
CALCIUM SERPL-MCNC: 8.6 MG/DL (ref 8.6–10.4)
CHLORIDE BLD-SCNC: 104 MMOL/L (ref 98–107)
CO2: 24 MMOL/L (ref 20–31)
CREAT SERPL-MCNC: 1.01 MG/DL (ref 0.5–0.9)
GFR AFRICAN AMERICAN: >60 ML/MIN
GFR NON-AFRICAN AMERICAN: 54 ML/MIN
GFR SERPL CREATININE-BSD FRML MDRD: ABNORMAL ML/MIN/{1.73_M2}
GLUCOSE BLD-MCNC: 108 MG/DL (ref 70–99)
HCT VFR BLD CALC: 32.7 % (ref 36–46)
HEMOGLOBIN: 11.1 G/DL (ref 12–16)
MCH RBC QN AUTO: 31.9 PG (ref 26–34)
MCHC RBC AUTO-ENTMCNC: 34 G/DL (ref 31–37)
MCV RBC AUTO: 93.7 FL (ref 80–100)
PDW BLD-RTO: 14.2 % (ref 11.5–14.9)
PLATELET # BLD: 273 K/UL (ref 150–450)
PMV BLD AUTO: 7 FL (ref 6–12)
POTASSIUM SERPL-SCNC: 4.1 MMOL/L (ref 3.7–5.3)
RBC # BLD: 3.49 M/UL (ref 4–5.2)
SODIUM BLD-SCNC: 143 MMOL/L (ref 135–144)
WBC # BLD: 6.3 K/UL (ref 3.5–11)

## 2022-03-20 PROCEDURE — 96376 TX/PRO/DX INJ SAME DRUG ADON: CPT

## 2022-03-20 PROCEDURE — G0378 HOSPITAL OBSERVATION PER HR: HCPCS

## 2022-03-20 PROCEDURE — 6370000000 HC RX 637 (ALT 250 FOR IP): Performed by: PSYCHIATRY & NEUROLOGY

## 2022-03-20 PROCEDURE — 85027 COMPLETE CBC AUTOMATED: CPT

## 2022-03-20 PROCEDURE — 99225 PR SBSQ OBSERVATION CARE/DAY 25 MINUTES: CPT | Performed by: PSYCHIATRY & NEUROLOGY

## 2022-03-20 PROCEDURE — 99224 PR SBSQ OBSERVATION CARE/DAY 15 MINUTES: CPT | Performed by: INTERNAL MEDICINE

## 2022-03-20 PROCEDURE — 6360000002 HC RX W HCPCS: Performed by: NURSE PRACTITIONER

## 2022-03-20 PROCEDURE — 6370000000 HC RX 637 (ALT 250 FOR IP): Performed by: INTERNAL MEDICINE

## 2022-03-20 PROCEDURE — 6370000000 HC RX 637 (ALT 250 FOR IP): Performed by: NURSE PRACTITIONER

## 2022-03-20 PROCEDURE — 2580000003 HC RX 258: Performed by: NURSE PRACTITIONER

## 2022-03-20 PROCEDURE — 36415 COLL VENOUS BLD VENIPUNCTURE: CPT

## 2022-03-20 PROCEDURE — 97110 THERAPEUTIC EXERCISES: CPT

## 2022-03-20 PROCEDURE — 80048 BASIC METABOLIC PNL TOTAL CA: CPT

## 2022-03-20 PROCEDURE — 97116 GAIT TRAINING THERAPY: CPT

## 2022-03-20 RX ADMIN — ACETAMINOPHEN 650 MG: 325 TABLET, FILM COATED ORAL at 03:56

## 2022-03-20 RX ADMIN — ATORVASTATIN CALCIUM 40 MG: 40 TABLET, FILM COATED ORAL at 20:10

## 2022-03-20 RX ADMIN — CEPHALEXIN 500 MG: 500 CAPSULE ORAL at 20:10

## 2022-03-20 RX ADMIN — ONDANSETRON 4 MG: 4 TABLET, ORALLY DISINTEGRATING ORAL at 07:57

## 2022-03-20 RX ADMIN — APIXABAN 5 MG: 5 TABLET, FILM COATED ORAL at 20:10

## 2022-03-20 RX ADMIN — CALCIUM CARBONATE 600 MG (1,500 MG)-VITAMIN D3 400 UNIT TABLET 1 TABLET: at 07:57

## 2022-03-20 RX ADMIN — CEPHALEXIN 500 MG: 500 CAPSULE ORAL at 07:57

## 2022-03-20 RX ADMIN — SODIUM CHLORIDE, PRESERVATIVE FREE 10 ML: 5 INJECTION INTRAVENOUS at 09:33

## 2022-03-20 RX ADMIN — FENOFIBRATE 160 MG: 160 TABLET ORAL at 07:57

## 2022-03-20 RX ADMIN — PRAMIPEXOLE DIHYDROCHLORIDE 0.5 MG: 0.25 TABLET ORAL at 20:11

## 2022-03-20 RX ADMIN — FUROSEMIDE 40 MG: 10 INJECTION, SOLUTION INTRAMUSCULAR; INTRAVENOUS at 07:56

## 2022-03-20 RX ADMIN — FUROSEMIDE 40 MG: 10 INJECTION, SOLUTION INTRAMUSCULAR; INTRAVENOUS at 18:10

## 2022-03-20 RX ADMIN — CARVEDILOL 12.5 MG: 12.5 TABLET, FILM COATED ORAL at 20:10

## 2022-03-20 RX ADMIN — FERROUS SULFATE TAB 325 MG (65 MG ELEMENTAL FE) 325 MG: 325 (65 FE) TAB at 20:10

## 2022-03-20 RX ADMIN — CITALOPRAM HYDROBROMIDE 20 MG: 20 TABLET ORAL at 07:57

## 2022-03-20 RX ADMIN — METRONIDAZOLE 100 MG: 500 TABLET ORAL at 20:10

## 2022-03-20 RX ADMIN — APIXABAN 5 MG: 5 TABLET, FILM COATED ORAL at 07:57

## 2022-03-20 RX ADMIN — CARVEDILOL 12.5 MG: 12.5 TABLET, FILM COATED ORAL at 07:57

## 2022-03-20 RX ADMIN — SODIUM CHLORIDE, PRESERVATIVE FREE 10 ML: 5 INJECTION INTRAVENOUS at 20:11

## 2022-03-20 RX ADMIN — METRONIDAZOLE 100 MG: 500 TABLET ORAL at 07:57

## 2022-03-20 RX ADMIN — CYANOCOBALAMIN TAB 1000 MCG 1000 MCG: 1000 TAB at 07:57

## 2022-03-20 RX ADMIN — FERROUS SULFATE TAB 325 MG (65 MG ELEMENTAL FE) 325 MG: 325 (65 FE) TAB at 07:56

## 2022-03-20 RX ADMIN — AMLODIPINE BESYLATE 10 MG: 5 TABLET ORAL at 07:56

## 2022-03-20 RX ADMIN — LISINOPRIL 30 MG: 20 TABLET ORAL at 07:58

## 2022-03-20 ASSESSMENT — PAIN DESCRIPTION - LOCATION
LOCATION: NECK;SHOULDER
LOCATION: NECK
LOCATION: NECK

## 2022-03-20 ASSESSMENT — PAIN SCALES - GENERAL
PAINLEVEL_OUTOF10: 6
PAINLEVEL_OUTOF10: 0
PAINLEVEL_OUTOF10: 5
PAINLEVEL_OUTOF10: 2

## 2022-03-20 ASSESSMENT — PAIN DESCRIPTION - PAIN TYPE
TYPE: ACUTE PAIN
TYPE: ACUTE PAIN

## 2022-03-20 NOTE — PROGRESS NOTES
Bedside reporting done, per Anna Polanco and Sudheer Davila RN's  IV intact,  INT, no redness noted  Bed alarm on  Pt rests peacefully

## 2022-03-20 NOTE — PROGRESS NOTES
Physical Therapy  Facility/Department: [facility] MED SURG  Daily Treatment Note  NAME: Willow Price  : 1950  MRN: [id number]    Date of Service: 3/20/2022    Discharge Recommendations   The patient would benefit from an intensive level of therapy after discharge from the facility. They should be able to tolerate 3-hours of Combined OT/PT/ST over 5 days/week or at least 900 minutes of  Combined Therapy over 7 days. This patient would benefit from a Physical Medicine and Rehab Consult. Assessment   Body structures, Functions, Activity limitations: Decreased functional mobility ; Decreased ADL status; Decreased ROM; Decreased strength;Decreased endurance;Decreased balance  Assessment: Completed treatment session consisting of strengthening, ambulation, and standing tolerance. Pt continues to need Jennifer to CGA for transfers and mobility due strength, balance, and coordination deficit. Difficulty in motor planning and adjusting movements. She is a high fall risk and would benefit from continued PT to ensure she is safe and highest level of independence for mobility   Treatment Diagnosis: Impaired functional mobility 2* ataxia  Specific instructions for Next Treatment: transfers, amb, balance, standing program  REQUIRES PT FOLLOW UP: Yes  Activity Tolerance  Activity Tolerance: Patient Tolerated treatment well     Patient Diagnosis(es): The encounter diagnosis was Frequent falls.      has a past medical history of Allergic rhinitis, cause unspecified, Back pain, Bowel obstruction (HCC), C. difficile diarrhea, CAD (coronary artery disease), Cardiac murmur, Cellulitis, Cellulitis, Cerebral artery occlusion with cerebral infarction ([facility] [address] COVID-19, Diverticulosis of colon (without mention of hemorrhage), GERD (gastroesophageal reflux disease), GERD (gastroesophageal reflux disease), History of blood transfusion, History of CHF (congestive heart failure), History of MI (myocardial infarction), History of ovarian cyst, History of peritonitis, HTN (hypertension), Hx of blood clots, Hyperlipidemia, Intestinal or peritoneal adhesions with obstruction (postoperative) (postinfection) ([address] Kidney infection, Lateral epicondylitis  of elbow, MDRO (multiple drug resistant organisms) resistance, Muscle strain, Other abnormal glucose, PONV (postoperative nausea and vomiting), Pre-diabetes, Restless legs syndrome (RLS), Snores, 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[address] TIA (transient ischemic attack), Uses walker, Vitamin D deficiency, Wears glasses, and Wellness examination. has a past surgical history that includes Ovary removal (1970); colectomy (9511); Appendectomy (1968); Ovary removal (1971); Hysterectomy (1973); Bunionectomy (Left); sinus surgery (2004); Colonoscopy; other surgical history (08/14/2014); cyst removal (Right); Wrist fracture surgery (Left, 03/05/2019); Upper gastrointestinal endoscopy (N/A, 05/31/2019); Upper gastrointestinal endoscopy (N/A, 08/05/2019); Upper gastrointestinal endoscopy (N/A, 08/23/2019); Abdomen surgery (1976); lumbar fusion (N/A, 02/10/2020); Cardiac catheterization; Cardiac catheterization (2005); [place] tooth extraction; lumbar fusion (N/A, 06/17/2020); back surgery; cervical fusion (05/21/2021); and cervical fusion (N/A, 5/21/2021). Restrictions  Restrictions/Precautions  Restrictions/Precautions: General Precautions,Fall Risk  Required Braces or Orthoses?: No  Implants present? : Metal implants (cervical and lumbar surgeries, L wrist ORIF)  Position Activity Restriction  Other position/activity restrictions: up as tolerated  Subjective   Subjective  Subjective: Pt sitting in chair eating breakfast. She is agreeable to PT. Notes she is still wanting to go to ARU. General Comment  Comments: ok'd for treatment per MEENU Moss  Pain Screening  Patient Currently in Pain: Yes  Pain Assessment  Pain Assessment: 0-10  Pain Level: 6  Pain Location: Neck; Chest (RN aware)  Vital Signs  Patient Currently in Pain: Yes       Orientation     Cognition      Objective      Transfers  Sit to Stand: Minimal Assistance (cues for hand placement, increased time for hip ext)  Stand to sit: Minimal Assistance (cues for handplacement)  Stand Pivot Transfers: Minimal Assistance  Comment: x5 STS for strengthening, cues for good hand placement with standing and sitting. Increased time to complete, needs time to bring feet underneath trunk. LLE mainly needing repositioned  Ambulation  Ambulation?: Yes  Ambulation 1  Surface: level tile  Device: Rolling Walker  Assistance: Minimal assistance  Quality of Gait: slow tawanda, decreased step length on L, unsteady  Gait Deviations: Decreased step length;Decreased step height;Slow Tawanda; Shuffles (forward flexed posure, )  Distance: 84 ft & 20ft in room  Comments: Completed long distance out of room. Cues for approximation to 2WW. cues for increased step length and height but limited by SL stability in stance. completed a second walk in room to/from bathroom. Increased time for navigating tight spaces with increased shuffling steps     Balance  Sitting - Static: Good  Sitting - Dynamic: Good;-  Standing - Static: Fair  Standing - Dynamic: Fair;-  Comments: completed standing at sink for x3' with reaching around KAREEM with 1 UE. Fair balance needing CGA with mild sway  Exercises  Hip Extension/Leg Presses: x15 (seated marches)  Knee Long Arc Quad: x15  Ankle Pumps: x15 (seated heel/toe rocks)  Comments: additionally x15 adduction squeezes with pillow  Other exercises  Other exercises?: Yes  Other exercises 1: Standing toe taps to 4\" step. with 2WW UE support         Goals  Short term goals  Time Frame for Short term goals: 5 days  Short term goal 1: Pt to demo bed mobility SBA. Short term goal 2: Pt to perform transfers CGA. Short term goal 3: pt to amb 50'-75' with device, CGA. Short term goal 4: Pt to improve BLE strength by 1/2 MMG.   Short term goal 5: Pt to improve standing balance to TERA. Patient Goals   Patient goals : To get stronger    Plan    Plan  Times per week: 5-6x/week  Specific instructions for Next Treatment: transfers, amb, balance, standing program  Current Treatment Recommendations: Strengthening,Balance Training,Functional Mobility Training,Transfer Training,Endurance Training,Gait Training,Equipment Evaluation, Education, & procurement,Patient/Caregiver Education & Training,Safety Education & Training  Safety Devices  Type of devices:  All fall risk precautions in place,Chair alarm in place,Left in chair,Gait belt,Call light within reach     Therapy Time   Individual Concurrent Group Co-treatment   Time In [address] [place] [address] Steve         Time Out 0849         Minutes 802 [facility], [address] [place] [address]

## 2022-03-20 NOTE — PROGRESS NOTES
[facility] Internal Medicine    Progress Note    3/20/2022    12:09 PM    Name:   Sergey Neves  MRN:     [id number]     Acct:      [de-identified]   Room:   2073/2073-01  IP Day:  0  Admit Date:  3/16/2022  8:17 PM    PCP:   Pavan Gallegos MD  Code Status:  Full Code    Subjective:     C/C:   Chief Complaint   Patient presents with    Leg Pain    Shortness of Breath    Fall    Neck Pain         Interval History Status: Improving    HPI:     See HPI    Review of Systems:     Denies any shortness of breath or cough  Denies chest pain or palpitations  Denies abdominal pain, diarrhea vomiting  Denies any new numbness tremors or weakness. Medications: Allergies:     Allergies   Allergen Reactions    Bactrim [Sulfamethoxazole-Trimethoprim] Other (See Comments)     sepsis    Codeine Itching    Seasonal        Current Meds:   Scheduled Meds:    sodium chloride flush  5-40 mL IntraVENous 2 times per day    amLODIPine  10 mg Oral Daily    apixaban  5 mg Oral BID    atorvastatin  40 mg Oral Nightly    carvedilol  12.5 mg Oral BID    citalopram  20 mg Oral Daily    cyanocobalamin  1,000 mcg Oral Daily    fenofibrate  160 mg Oral Daily    ferrous sulfate  325 mg Oral BID    lisinopril  30 mg Oral Daily    pramipexole  0.5 mg Oral Nightly    furosemide  40 mg IntraVENous BID    calcium carbonate w/vitamin D  1 tablet Oral Daily    cephALEXin  500 mg Oral 2 times per day    gabapentin  100 mg Oral BID     Continuous Infusions:    sodium chloride       PRN Meds: potassium chloride **OR** potassium alternative oral replacement **OR** potassium chloride, sodium chloride flush, sodium chloride, ondansetron **OR** ondansetron, magnesium hydroxide, acetaminophen **OR** acetaminophen, docusate sodium, sodium chloride flush    Data:     Past Medical History:   has a past medical history of Allergic rhinitis, cause unspecified, Back pain, Bowel obstruction (HCC), C. difficile diarrhea, CAD (coronary artery disease), Cardiac murmur, Cellulitis, Cellulitis, Cerebral artery occlusion with cerebral infarction ([address] COVID-19, Diverticulosis of colon (without mention of hemorrhage), GERD (gastroesophageal reflux disease), GERD (gastroesophageal reflux disease), History of blood transfusion, History of CHF (congestive heart failure), History of MI (myocardial infarction), History of ovarian cyst, History of peritonitis, HTN (hypertension), Hx of blood clots, Hyperlipidemia, Intestinal or peritoneal adhesions with obstruction (postoperative) (postinfection) ([address] Kidney infection, Lateral epicondylitis  of elbow, MDRO (multiple drug resistant organisms) resistance, Muscle strain, Other abnormal glucose, PONV (postoperative nausea and vomiting), Pre-diabetes, Restless legs syndrome (RLS), Snores, 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[address] TIA (transient ischemic attack), Uses walker, Vitamin D deficiency, Wears glasses, and Wellness examination. Social History:   reports that she quit smoking about 4 years ago. Her smoking use included cigarettes. She started smoking about 26 years ago. She has a 10.00 pack-year smoking history. She has never used smokeless tobacco. She reports that she does not drink alcohol and does not use drugs. Family History:   Family History   Problem Relation Age of Onset    Stroke Mother     Diabetes Mother     Heart Disease Mother     High Blood Pressure Mother     Heart Disease Father     Heart Disease Brother     High Blood Pressure Brother     Heart Disease Maternal Grandmother     High Blood Pressure Sister        Vitals:  /64   Pulse 77   Temp 98 °F (36.7 °C) (Oral)   Resp 18   Ht 5' 3\" (1.6 m)   Wt 181 lb 3.5 oz (82.2 kg)   SpO2 95%   BMI 32.10 kg/m²   Temp (24hrs), Av.4 °F (36.9 °C), Min:98 °F (36.7 °C), Max:98.7 °F (37.1 °C)    No results for input(s): POCGLU in the last 72 hours. I/O (24Hr):     Intake/Output Summary (Last 24 hours) at 3/20/2022 1209  Last data filed at 3/20/2022 0746  Gross per 24 hour   Intake 410 ml   Output 1800 ml   Net -1390 ml       Labs:    Lab Results   Component Value Date    WBC 6.3 03/20/2022    HGB 11.1 (L) 03/20/2022    HCT 32.7 (L) 03/20/2022    MCV 93.7 03/20/2022     03/20/2022     Lab Results   Component Value Date     03/20/2022    K 4.1 03/20/2022     03/20/2022    CO2 24 03/20/2022    BUN 38 03/20/2022    CREATININE 1.01 03/20/2022    GLUCOSE 108 03/20/2022    CALCIUM 8.6 03/20/2022          Lab Results   Component Value Date/Time    SPECIAL NOT REPORTED 05/21/2021 09:58 AM     Lab Results   Component Value Date/Time    CULTURE NO GROWTH 05/21/2021 09:58 AM         Radiology:    Recent data reviewed    Physical Examination:        General appearance:  alert, cooperative and no distress  Eyes: Anicteric sclera. Pupils are equally round and reactive to light. Extraocular movements are intact.   Lungs:  clear to auscultation bilaterally, normal effort  Heart:  regular rate and rhythm, no murmur  Abdomen:  soft, nontender, nondistended, normal bowel sounds, no masses, hepatomegaly, splenomegaly  Extremities:  no edema, redness, tenderness in the calves  Skin:  no gross lesions, rashes, induration  Neuro:  Alert, oriented X 3, no new focal weakness still has low back pain  Assessment:        Primary Problem  Ataxia    Active Hospital Problems    Diagnosis Date Noted    Cervical myelopathy ([facility] [G95.9] 03/17/2022    Frequent falls [R29.6]     Type 2 diabetes mellitus with circulatory disorder, without long-term current use of insulin ([facility] [E11.59] 03/18/2019    Ataxia [R27.0] 08/05/2015    Peripheral edema [R60.9] 04/13/2015             Plan:        Presenting with multiple falls over last several weeks in the setting of chronic cervical spine stenosis with myelopathy status post cervical fusion follow neurosurgery  Recently started on Eliquis for acute DVT right leg, complaining of right leg pain  Trauma work-up CT brain negative in ER  Multiple remote and healing rib fractures noted  Uses walker at home  Neurology consulted for worsening gait instability-reset MRI thoracic spine did show T2-T3 and T5-T7 moderate neural foraminal narrowing  PT OT consult, will need placement     Her last fall was related to post void dizziness    Bilateral leg swelling, skin excoriation with surrounding redness/early cellulitis-no fever no leukocytosis-we will startabx    3/18  Awaiting MRI of lumbar spine appreciate neurology recommendations  Diuresing on IV Lasix -leg swelling is better  PT OT and PMNR consulted for discharge planning    3/19  Mild chronic changes on MRI  Awaiting placement to rehab    3/20  No acute issues overnight  awaitng placement    Robert Cueva MD  3/20/2022  12:09 PM

## 2022-03-20 NOTE — PROGRESS NOTES
Active problem Frequent falling episodes . Cervical myelopathy status post decompression with residual left side weakness . Rule out lumbar stenosis. The condition is she walked 84 feet with rolling walker with minimal assistance in PT MRI lumbar spine  L4-5 prior posterior fusion with grade 1 to 2 anterolisthesis with laminectomy . L5-S1 bilateral laminectomies . She has history of prior L4-5 and L5-S1  fusion with laminectomy . 70 yo lady with falling . She presented to [facility] ER with falling having had 2 falls this week with one of these landing on left neck  . She has history of cervical myelopathy seen neurosurgery Dr Aamir Parr having had prior C3-6 posterior fusion with laminectomies last year  . MRI cervical spine fron January 2022 posterior fixation C3 to C6 with multi level degenerative changes with no canal stenosis . She reports that for one year even before neck surgery she has had mild eft arm weakness dropping things with left hand along with left leg weakness with leg giveway along with left foot going out  . She has gait imbalance walking with walker with falling that can be as frequent as once per week . There is bilateral leg edema . There has been neck pain . MRI thoracic spine from March with normal cord with moderate to severe bilateral T2-T3 neural foraminal stenosis . Mild to moderate right T5-6 and T6-7  neural foraminal stenosis . Head CT this admission with mild chronic periventricular small vessel disease . CT cervical spine posterior metallic fusion U3-A3 with laminectomies. She is on eliquis 5 mg po bid  for DVT . Significant medications eliquis 5 mg po bid ,neurontin 100 mg po bid . Testing MRI cervical spine fron January posterior fixation C3 to C6 with multi level degenerative changes with no canal stenosis, January 2022 . MRI thoracic spine from March with normal cord with moderate to severe bilateral T2-T3 neural foraminal stenosis .  Mild to moderate right T5-6 and T6-7 neural foarminal stenosis , march 2022 Head CT this admission with mild chronic periventricular small vessel disease . CT cervical spine posterior metallic fusion H0-W1 with laminectomies. MRI lumbar spine  L4-5 prior posterior fusion with grade 1 to 2 anterolisthesis with laminectomy . L5-S1 bilateral laminectomies   Past Medical History:   Diagnosis Date    Allergic rhinitis, cause unspecified     Back pain     lumbar    Bowel obstruction (HCC)     history of due to scar tissue, resolved non-surgically    C. difficile diarrhea     CAD (coronary artery disease)     no stent needed per pt.  Dr. Haider Dobbins did cath at  2005    Cardiac murmur     Cellulitis     left leg    Cellulitis 2017 August    leg left leg/bug bite    Cerebral artery occlusion with cerebral infarction [facility])     TIA 2014    COVID-19     ONE YR AGO IN 4/25/2020 fever and cough    Diverticulosis of colon (without mention of hemorrhage)     GERD (gastroesophageal reflux disease)     GERD (gastroesophageal reflux disease)     on rx    History of blood transfusion     approx 2020        History of CHF (congestive heart failure)     History of MI (myocardial infarction) 2005    thought due to a blood clot    History of ovarian cyst 1970    had oopherectomy holly    History of peritonitis 1968    due to ruptured appendix age 13    HTN (hypertension)     Hx of blood clots     right leg    Hyperlipidemia     Intestinal or peritoneal adhesions with obstruction (postoperative) (postinfection) ([facility]     Kidney infection     renal failure/sepsis/spider bite    Lateral epicondylitis  of elbow     MDRO (multiple drug resistant organisms) resistance     c diff    Muscle strain     right posterior shoulder    Other abnormal glucose     PONV (postoperative nausea and vomiting)     dry heaves    Pre-diabetes     Restless legs syndrome (RLS)     Snores     no cpap    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)     TIA (transient ischemic attack) 2014    Uses walker     Vitamin D deficiency     Wears glasses     Wellness examination     last seen 2 weeks ago       Past Surgical History:   Procedure Laterality Date    ABDOMEN SURGERY  1976    benign tumor removed near remaining ovary, 1.5 pounds    APPENDECTOMY  1968    appendix ruptured, developed peritonitis    BACK SURGERY      BUNIONECTOMY Left     along with calcium deposits removed   [address]  2005    negative    CERVICAL FUSION  05/21/2021    POSTERIOR C3-6 LAMINECTOMY, PARTIAL C7 LAMINECTOMY, FUSION C3-C6, SILVERCORD    CERVICAL FUSION N/A 5/21/2021    POSTERIOR C3-6 LAMINECTOMY, PARTIAL C7 LAMINECTOMY, FUSION C3-C6, SILVERCORD performed by Caryl Solorio DO at [address]    12 INCHES REMOVED D/T OBSTRUCTION    COLONOSCOPY      CYST REMOVAL Right     right facial    HYSTERECTOMY  1973    taken as a result of recurring cysts    LUMBAR FUSION N/A 02/10/2020    LUMBAR L4-5 POSTERIOR  DECOMPRESSION INSTRUMENTATION FUSION WCEMENT AUGMENTATION/ performed by Florin Baltazar MD at [place] [address] N/A 06/17/2020    L5-S1 PLIF L4-L5 REVISION performed by Florin Baltazar MD at [facility]  08/14/2014    [address]    UNILATERAL due to cyst    OVARY REMOVAL  1971    partial, due to cyst   J Carlos Porch SINUS SURGERY  2004    UPPER GASTROINTESTINAL ENDOSCOPY N/A 05/31/2019    EGD ESOPHAGOGASTRODUODENOSCOPY performed by Tiffany Lee MD at [address] [facility] N/A 08/05/2019    EGD BIOPSY performed by Michael Gamez MD at [address] [facility] N/A 08/23/2019    EGD BIOPSY performed by Tiffany Lee MD at [address] [facility] 03/05/2019    WRIST OPEN REDUCTION INTERNAL FIXATION performed by Eloy Campos MD at [address]       Family History   Problem Relation Age of Onset    Stroke Mother     Diabetes Mother     Heart Disease Mother     High Blood Pressure Mother     Heart Disease Father     Heart Disease Brother     High Blood Pressure Brother     Heart Disease Maternal Grandmother     High Blood Pressure Sister        Social History     Socioeconomic History    Marital status:      Spouse name: None    Number of children: None    Years of education: None    Highest education level: None   Occupational History    Occupation: retired   Tobacco Use    Smoking status: Former Smoker     Packs/day: 0.50     Years: 20.00     Pack years: 10.00     Types: Cigarettes     Start date: 1995     Quit date: 2017     Years since quittin.7    Smokeless tobacco: Never Used   Vaping Use    Vaping Use: Never used   Substance and Sexual Activity    Alcohol use: No     Alcohol/week: 0.0 standard drinks    Drug use: No    Sexual activity: None   Other Topics Concern    None   Social History Narrative    None     Social Determinants of Health     Financial Resource Strain: Low Risk     Difficulty of Paying Living Expenses: Not hard at all   Food Insecurity: No Food Insecurity    Worried About Running Out of Food in the Last Year: Never true    Raghavendra of Food in the Last Year: Never true   Transportation Needs:     Lack of Transportation (Medical): Not on file    Lack of Transportation (Non-Medical):  Not on file   Physical Activity:     Days of Exercise per Week: Not on file    Minutes of Exercise per Session: Not on file   Stress:     Feeling of Stress : Not on file   Social Connections:     Frequency of Communication with Friends and Family: Not on file    Frequency of Social Gatherings with Friends and Family: Not on file    Attends Confucianist Services: Not on file    Active Member of Clubs or Organizations: Not on file    Attends Club or Organization Meetings: Not on file    Marital Status: Not on file   Intimate Partner Violence:     Fear of Current or Ex-Partner: Not on file    Emotionally Abused: Not on file    Physically Abused: Not on file    Sexually Abused: Not on file   Housing Stability:     Unable to Pay for Housing in the Last Year: Not on file    Number of Stew in the Last Year: Not on file    Unstable Housing in the Last Year: Not on file       Current Facility-Administered Medications   Medication Dose Route Frequency Provider Last Rate Last Admin    potassium chloride (KLOR-CON M) extended release tablet 40 mEq  40 mEq Oral PRN Donnetta Ahumada, MD        Or    potassium bicarb-citric acid (EFFER-K) effervescent tablet 40 mEq  40 mEq Oral PRN Donnetta Ahumada, MD        Or    potassium chloride 10 mEq/100 mL IVPB (Peripheral Line)  10 mEq IntraVENous PRN Donnetta Ahumada, MD        sodium chloride flush 0.9 % injection 5-40 mL  5-40 mL IntraVENous 2 times per day Maurene Nickels, APRN - CNP   10 mL at 03/19/22 2146    sodium chloride flush 0.9 % injection 10 mL  10 mL IntraVENous PRN Maurene Nickels, APRN - CNP        0.9 % sodium chloride infusion  25 mL IntraVENous PRN Maurene Nickels, APRN - CNP        ondansetron (ZOFRAN-ODT) disintegrating tablet 4 mg  4 mg Oral Q8H PRN Maurene Nickels, APRN - CNP   4 mg at 03/20/22 0757    Or    ondansetron (ZOFRAN) injection 4 mg  4 mg IntraVENous Q6H PRN Maurene Nickels, APRN - CNP        magnesium hydroxide (MILK OF MAGNESIA) 400 MG/5ML suspension 30 mL  30 mL Oral Daily PRN Maurene Nickels, APRN - CNP        acetaminophen (TYLENOL) tablet 650 mg  650 mg Oral Q6H PRN Maurene Nickels, APRN - CNP   650 mg at 03/20/22 0356    Or    acetaminophen (TYLENOL) suppository 650 mg  650 mg Rectal Q6H PRN Maurene Nickels, APRN - CNP        amLODIPine (NORVASC) tablet 10 mg  10 mg Oral Daily Maurene Nickels, APRN - CNP   10 mg at 03/20/22 0756    apixaban (ELIQUIS) tablet 5 mg  5 mg Oral BID Maurene Nickels, APRN - CNP   5 mg at 03/20/22 0757    atorvastatin (LIPITOR) tablet 40 mg  40 mg Oral Nightly Haider Lockett, APRN - CNP   40 mg at 03/19/22 2140    carvedilol (COREG) tablet 12.5 mg  12.5 mg Oral BID Amairani Fast, APRN - CNP   12.5 mg at 03/20/22 0757    citalopram (CELEXA) tablet 20 mg  20 mg Oral Daily Amairani Fast, APRN - CNP   20 mg at 03/20/22 0757    vitamin B-12 (CYANOCOBALAMIN) tablet 1,000 mcg  1,000 mcg Oral Daily Amairani Fast, APRN - CNP   1,000 mcg at 03/20/22 0757    docusate sodium (COLACE) capsule 100 mg  100 mg Oral BID PRN Amairani Fast, APRN - CNP   100 mg at 03/17/22 0106    fenofibrate (TRIGLIDE) tablet 160 mg  160 mg Oral Daily Amairani Fast, APRN - CNP   160 mg at 03/20/22 0757    ferrous sulfate (IRON 325) tablet 325 mg  325 mg Oral BID Amairani Fast, APRN - CNP   325 mg at 03/20/22 0756    lisinopril (PRINIVIL;ZESTRIL) tablet 30 mg  30 mg Oral Daily Amairani Fast, APRN - CNP   30 mg at 03/20/22 0567    pramipexole (MIRAPEX) tablet 0.5 mg  0.5 mg Oral Nightly Amairani Fast, APRN - CNP   0.5 mg at 03/19/22 2146    furosemide (LASIX) injection 40 mg  40 mg IntraVENous BID Amairani Fast, APRN - CNP   40 mg at 03/20/22 0756    calcium carbonate w/vitamin D (CALTRATE) 600-400 MG-UNIT per tab 1 tablet  1 tablet Oral Daily Amairani Fast, APRN - CNP   1 tablet at 03/20/22 0757    cephALEXin (KEFLEX) capsule 500 mg  500 mg Oral 2 times per day Jo Feliz MD   500 mg at 03/20/22 0757    gabapentin (NEURONTIN) capsule 100 mg  100 mg Oral BID Jadon Pate MD   100 mg at 03/20/22 9183    sodium chloride flush 0.9 % injection 10 mL  10 mL IntraVENous PRN Jonny Butt MD   10 mL at 03/16/22 2141       Allergies   Allergen Reactions    Bactrim [Sulfamethoxazole-Trimethoprim] Other (See Comments)     sepsis    Codeine Itching    Seasonal        ROS:   Constitutional                  Negative for fever and chills   HEENT                            Negative for ear discharge, ear pain, nosebleed  Eyes                                Negative for photophobia, pain and discharge  Respiratory Negative for hemoptysis and sputum  Cardiovascular                Negative for orthopnea, claudication and PND  Gastrointestinal               Negative for abdominal pain, diarrhea, blood in stool  Musculoskeletal               Negative for joint pain, negative for myalgia  Skin                                 Negative for rash or itching  Endo/heme/allergies       Negative for polydipsia, environmental allergy  Psychiatric                       Negative for suicidal ideation. Patient is not anxious    Vitals:    03/20/22 1255   BP: 120/66   Pulse: 76   Resp: 19   Temp: 98.2 °F (36.8 °C)   SpO2: 94%     Admission weight: 190 lb 7.6 oz (86.4 kg)    Neurological Examination  Constitutional .General exam well groomed   Head/ Ears /Nose/Throat/external ear . Normal exam  Neck and thyroid . Normal size. No bruits  Respiratory . Breathsounds clear bilaterally  Cardiovascular: Auscultation of heart with regular rate and rhythm   Musculoskeletal. Muscle bulk and tone normal                                                           Muscle strength left ADF and EHL 4+/5 otherwise 5/5 strength throughout                                                                                No dysmetria or dysdiadokinesis  No tremor   Normal fine motor  Orientation Alert and oriented x 3   Attention and concentration normal  Short term memory normal  Language process and speech normal . No aphasia   Cranial nerve 2 normal acuety and visual fields  Cranial nerve 3, 4 and 6 . Extraocular muscles are intact . Pupils are equal and reactive   Cranial nerve 5 . Intact corneal reflex. Normal facial sensation  Cranial nerve 7 normal exam   Cranial nerve 8. Grossly intact hearing   Cranial nerve 9 and 10. Symmetric palate elevation   Cranial nerve 11 , 5 out of 5 strength   Cranial Nerve 12 midline tongue . No atrophy  Sensation .  Normal pinprick and light touch   Deep Tendon Reflexes normal  Plantar response flexor bilaterally    Assessment :    Frequent falling episodes . Cervical myelopathy status post decompression with residual left side weakness     Plan:     PMR consultation .  Will need rehabilitation

## 2022-03-20 NOTE — PLAN OF CARE
Problem: Pain:  Goal: Pain level will decrease  Description: Pain level will decrease  3/20/2022 0448 by Geronimo Hunter RN  Outcome: Ongoing     Problem: Pain:  Goal: Control of acute pain  Description: Control of acute pain  3/20/2022 0448 by Geronimo Hunter RN  Outcome: Ongoing     Problem: Pain:  Goal: Control of chronic pain  Description: Control of chronic pain  Outcome: Ongoing     Problem: Falls - Risk of:  Goal: Will remain free from falls  Description: Will remain free from falls  3/20/2022 0448 by Geronimo Hunter RN  Outcome: Ongoing     Problem: Falls - Risk of:  Goal: Absence of physical injury  Description: Absence of physical injury  Outcome: Ongoing     Problem: Neurological  Goal: Maximum potential motor/sensory/cognitive function  Outcome: Ongoing

## 2022-03-21 PROCEDURE — G0378 HOSPITAL OBSERVATION PER HR: HCPCS

## 2022-03-21 PROCEDURE — 96376 TX/PRO/DX INJ SAME DRUG ADON: CPT

## 2022-03-21 PROCEDURE — 6370000000 HC RX 637 (ALT 250 FOR IP): Performed by: NURSE PRACTITIONER

## 2022-03-21 PROCEDURE — 97530 THERAPEUTIC ACTIVITIES: CPT

## 2022-03-21 PROCEDURE — 6370000000 HC RX 637 (ALT 250 FOR IP): Performed by: INTERNAL MEDICINE

## 2022-03-21 PROCEDURE — 99222 1ST HOSP IP/OBS MODERATE 55: CPT | Performed by: PHYSICAL MEDICINE & REHABILITATION

## 2022-03-21 PROCEDURE — 6370000000 HC RX 637 (ALT 250 FOR IP): Performed by: PSYCHIATRY & NEUROLOGY

## 2022-03-21 PROCEDURE — 6360000002 HC RX W HCPCS: Performed by: NURSE PRACTITIONER

## 2022-03-21 PROCEDURE — 97110 THERAPEUTIC EXERCISES: CPT

## 2022-03-21 PROCEDURE — 99225 PR SBSQ OBSERVATION CARE/DAY 25 MINUTES: CPT | Performed by: INTERNAL MEDICINE

## 2022-03-21 PROCEDURE — 2580000003 HC RX 258: Performed by: NURSE PRACTITIONER

## 2022-03-21 PROCEDURE — 97116 GAIT TRAINING THERAPY: CPT

## 2022-03-21 RX ORDER — FUROSEMIDE 40 MG/1
40 TABLET ORAL 2 TIMES DAILY
Status: DISCONTINUED | OUTPATIENT
Start: 2022-03-21 | End: 2022-03-23

## 2022-03-21 RX ADMIN — METRONIDAZOLE 100 MG: 500 TABLET ORAL at 21:05

## 2022-03-21 RX ADMIN — FERROUS SULFATE TAB 325 MG (65 MG ELEMENTAL FE) 325 MG: 325 (65 FE) TAB at 21:05

## 2022-03-21 RX ADMIN — APIXABAN 5 MG: 5 TABLET, FILM COATED ORAL at 21:05

## 2022-03-21 RX ADMIN — CYANOCOBALAMIN TAB 1000 MCG 1000 MCG: 1000 TAB at 08:05

## 2022-03-21 RX ADMIN — SODIUM CHLORIDE, PRESERVATIVE FREE 10 ML: 5 INJECTION INTRAVENOUS at 21:05

## 2022-03-21 RX ADMIN — ONDANSETRON 4 MG: 4 TABLET, ORALLY DISINTEGRATING ORAL at 07:54

## 2022-03-21 RX ADMIN — CEPHALEXIN 500 MG: 500 CAPSULE ORAL at 08:05

## 2022-03-21 RX ADMIN — LISINOPRIL 30 MG: 20 TABLET ORAL at 08:05

## 2022-03-21 RX ADMIN — FUROSEMIDE 40 MG: 10 INJECTION, SOLUTION INTRAMUSCULAR; INTRAVENOUS at 08:05

## 2022-03-21 RX ADMIN — APIXABAN 5 MG: 5 TABLET, FILM COATED ORAL at 08:05

## 2022-03-21 RX ADMIN — CEPHALEXIN 500 MG: 500 CAPSULE ORAL at 21:04

## 2022-03-21 RX ADMIN — ATORVASTATIN CALCIUM 40 MG: 40 TABLET, FILM COATED ORAL at 21:05

## 2022-03-21 RX ADMIN — ACETAMINOPHEN 650 MG: 325 TABLET, FILM COATED ORAL at 21:05

## 2022-03-21 RX ADMIN — AMLODIPINE BESYLATE 10 MG: 5 TABLET ORAL at 07:54

## 2022-03-21 RX ADMIN — CARVEDILOL 12.5 MG: 12.5 TABLET, FILM COATED ORAL at 08:05

## 2022-03-21 RX ADMIN — CITALOPRAM HYDROBROMIDE 20 MG: 20 TABLET ORAL at 07:54

## 2022-03-21 RX ADMIN — CALCIUM CARBONATE 600 MG (1,500 MG)-VITAMIN D3 400 UNIT TABLET 1 TABLET: at 08:05

## 2022-03-21 RX ADMIN — FENOFIBRATE 160 MG: 160 TABLET ORAL at 08:05

## 2022-03-21 RX ADMIN — CARVEDILOL 12.5 MG: 12.5 TABLET, FILM COATED ORAL at 21:05

## 2022-03-21 RX ADMIN — PRAMIPEXOLE DIHYDROCHLORIDE 0.5 MG: 0.25 TABLET ORAL at 21:05

## 2022-03-21 RX ADMIN — FERROUS SULFATE TAB 325 MG (65 MG ELEMENTAL FE) 325 MG: 325 (65 FE) TAB at 08:05

## 2022-03-21 RX ADMIN — ACETAMINOPHEN 650 MG: 325 TABLET, FILM COATED ORAL at 06:17

## 2022-03-21 RX ADMIN — FUROSEMIDE 40 MG: 40 TABLET ORAL at 18:50

## 2022-03-21 RX ADMIN — METRONIDAZOLE 100 MG: 500 TABLET ORAL at 08:05

## 2022-03-21 RX ADMIN — SODIUM CHLORIDE, PRESERVATIVE FREE 10 ML: 5 INJECTION INTRAVENOUS at 08:05

## 2022-03-21 ASSESSMENT — PAIN SCALES - GENERAL
PAINLEVEL_OUTOF10: 0
PAINLEVEL_OUTOF10: 7
PAINLEVEL_OUTOF10: 5

## 2022-03-21 ASSESSMENT — PAIN DESCRIPTION - LOCATION: LOCATION: NECK

## 2022-03-21 ASSESSMENT — PAIN DESCRIPTION - PAIN TYPE
TYPE: ACUTE PAIN
TYPE: ACUTE PAIN

## 2022-03-21 NOTE — PLAN OF CARE
Problem: Pain:  Goal: Pain level will decrease  Description: Pain level will decrease  3/21/2022 0409 by Alisha Pierre RN  Outcome: Ongoing     Problem: Pain:  Goal: Control of acute pain  Description: Control of acute pain  3/21/2022 0409 by Alisha Pierre RN  Outcome: Ongoing     Problem: Pain:  Goal: Control of chronic pain  Description: Control of chronic pain  Outcome: Ongoing     Problem: Falls - Risk of:  Goal: Will remain free from falls  Description: Will remain free from falls  3/21/2022 0409 by Alisha Pierre RN  Outcome: Ongoing     Problem: Falls - Risk of:  Goal: Absence of physical injury  Description: Absence of physical injury  Outcome: Ongoing     Problem: Neurological  Goal: Maximum potential motor/sensory/cognitive function  Outcome: Ongoing

## 2022-03-21 NOTE — ACP (ADVANCE CARE PLANNING)
Advance Care Planning     Advance Care Planning Activator (Inpatient)  Conversation Note      Date of ACP Conversation: 3/21/2022     Conversation Conducted with: Patient with Decision Making Capacity    ACP Activator: Jordon Paez RN        Health Care Decision Maker:       Care Preferences    Ventilation: \"If you were in your present state of health and suddenly became very ill and were unable to breathe on your own, what would your preference be about the use of a ventilator (breathing machine) if it were available to you? \"      Would the patient desire the use of ventilator (breathing machine)?: yes    \"If your health worsens and it becomes clear that your chance of recovery is unlikely, what would your preference be about the use of a ventilator (breathing machine) if it were available to you? \"     Would the patient desire the use of ventilator (breathing machine)?: Yes      Resuscitation  \"CPR works best to restart the heart when there is a sudden event, like a heart attack, in someone who is otherwise healthy. Unfortunately, CPR does not typically restart the heart for people who have serious health conditions or who are very sick. \"    \"In the event your heart stopped as a result of an underlying serious health condition, would you want attempts to be made to restart your heart (answer \"yes\" for attempt to resuscitate) or would you prefer a natural death (answer \"no\" for do not attempt to resuscitate)? \" yes       [] Yes   [] No   Educated Patient / Keysha Mancia regarding differences between Advance Directives and portable DNR orders.     Length of ACP Conversation in minutes:      Conversation Outcomes:  [] ACP discussion completed  [] Existing advance directive reviewed with patient; no changes to patient's previously recorded wishes  [] New Advance Directive completed  [] Portable Do Not Rescitate prepared for Provider review and signature  [] POLST/POST/MOLST/MOST prepared for Provider review and signature      Follow-up plan:    [] Schedule follow-up conversation to continue planning  [] Referred individual to Provider for additional questions/concerns   [] Advised patient/agent/surrogate to review completed ACP document and update if needed with changes in condition, patient preferences or care setting    [x] This note routed to one or more involved healthcare providers

## 2022-03-21 NOTE — PROGRESS NOTES
Physical Therapy  [facility]   Physical Therapy Progress Note    Date: 3/21/22  Patient Name: Willow Price       Room: 1822/4710-56  MRN: [id number]   Account: [de-identified]   : 1950  (76 y.o.) Gender: female     Referring Practitioner: Yamileth Valencia MD  Diagnosis: Ataxia  Past Medical History:  has a past medical history of Allergic rhinitis, cause unspecified, Back pain, Bowel obstruction ([address] C. difficile diarrhea, CAD (coronary artery disease), Cardiac murmur, Cellulitis, Cellulitis, Cerebral artery occlusion with cerebral infarction ([address] COVID-19, Diverticulosis of colon (without mention of hemorrhage), GERD (gastroesophageal reflux disease), GERD (gastroesophageal reflux disease), History of blood transfusion, History of CHF (congestive heart failure), History of MI (myocardial infarction), History of ovarian cyst, History of peritonitis, HTN (hypertension), Hx of blood clots, Hyperlipidemia, Intestinal or peritoneal adhesions with obstruction (postoperative) (postinfection) ([address] Kidney infection, Lateral epicondylitis  of elbow, MDRO (multiple drug resistant organisms) resistance, Muscle strain, Other abnormal glucose, PONV (postoperative nausea and vomiting), Pre-diabetes, Restless legs syndrome (RLS), Snores, 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[address] TIA (transient ischemic attack), Uses walker, Vitamin D deficiency, Wears glasses, and Wellness examination. Past Surgical History:   has a past surgical history that includes Ovary removal (); colectomy (); Appendectomy (); Ovary removal (); Hysterectomy (); Bunionectomy (Left); sinus surgery (); Colonoscopy; other surgical history (2014); cyst removal (Right); Wrist fracture surgery (Left, 2019); Upper gastrointestinal endoscopy (N/A, 2019); Upper gastrointestinal endoscopy (N/A, 2019); Upper gastrointestinal endoscopy (N/A, 2019);  Abdomen surgery (); lumbar fusion (N/A, 02/10/2020); Cardiac catheterization; Cardiac catheterization (2005); [place] tooth extraction; lumbar fusion (N/A, 06/17/2020); back surgery; cervical fusion (05/21/2021); and cervical fusion (N/A, 5/21/2021). Additional Pertinent Hx: TIA       Restrictions/Precautions  Restrictions/Precautions: General Precautions; Fall Risk  Required Braces or Orthoses?: No  Implants present? : Metal implants (cervical and lumbar surgeries, L wrist ORIF)  Position Activity Restriction  Other position/activity restrictions: up as tolerated    Subjective: Pt sitting in chair agreeable to PT. Vital Signs  Patient Currently in Pain: Denies                   Bed Mobility:   Bed Mobility  Comment: not completed on this date    Transfers:  Sit to Stand: Minimal Assistance (cues for hand placement, increased time for hip ext)  Stand to sit: Minimal Assistance (cues for handplacement)                 Ambulation 1  Surface: level tile  Device: Rolling Walker  Assistance: Contact guard assistance  Quality of Gait: slow tawanda with step to pattern. Increase tone in LLE causing increase difficulty to advance LLE. Cues to focus on bending her knee and kicking foot forward. Gait Deviations: Decreased step length;Decreased step height;Shuffles  Distance: 82ft  Comments: Increase time to complete task. slightly unsteady.         Stairs/Curb  Stairs?: No        BALANCE Posture: Good  Sitting - Static: Good  Sitting - Dynamic: Good;-  Standing - Static: Fair (at [place])  Standing - Dynamic: Fair;- (at [place])    EXERCISES    Other exercises?: Yes  Other exercises 1: (B) LE seated ex x 15  Other exercises 2: (B)LE standing ex x 10  Other exercises 3: STS x 3 (cues for safety and technique)           Activity Tolerance: Patient Tolerated treatment well  PT Equipment Recommendations  Other: TBD     Current Treatment Recommendations: Strengthening,Balance Training,Functional Mobility Training,Transfer Mardene Servant Training,Equipment Evaluation, Education, & procurement,Patient/Caregiver Education & Training,Safety Education & Training    Conditions Requiring Skilled Therapeutic Intervention  Body structures, Functions, Activity limitations: Decreased functional mobility ; Decreased ADL status; Decreased ROM; Decreased strength;Decreased endurance;Decreased balance  Assessment: Completed treatment session consisting of strengthening, ambulation, and standing tolerance. Pt continues to need Jennifer to CGA for transfers and mobility due strength, balance, and coordination deficit. Difficulty in motor planning and adjusting movements. She is a high fall risk and would benefit from continued PT to ensure she is safe and highest level of independence for mobility   Treatment Diagnosis: Impaired functional mobility 2* ataxia  Prognosis: Good  Exam: ROM, MMT, bed mobility, transfers, amb  Clinical Presentation: Pt alert, cooperative, pleasant  Barriers to Learning: none  REQUIRES PT FOLLOW UP: Yes  Discharge Recommendations: Patient would benefit from continued therapy after discharge    Goals  Short term goals  Time Frame for Short term goals: 5 days  Short term goal 1: Pt to demo bed mobility SBA. Short term goal 2: Pt to perform transfers CGA. Short term goal 3: pt to amb 50'-75' with device, CGA. Short term goal 4: Pt to improve BLE strength by 1/2 MMG. Short term goal 5: Pt to improve standing balance to [facility].        03/21/22 0959   PT Individual Minutes   Time In 0919   Time Out 0957   Minutes 38       Electronically signed by Dominique Hatch PTA on 3/21/22 at 10:03 AM EDT

## 2022-03-21 NOTE — PROGRESS NOTES
Initiated precert for ARU with Alex Lew with pending ref [id number]. Notified Kayla, [address]    Pt's benefits for ARU verified with [employer] via [employer] and are as follows:  $325/d for days 1-5, then 100% coverage beginning day 6.

## 2022-03-21 NOTE — CONSULTS
Physical Medicine & Rehabilitation  Consult Note      Admitting Physician: Neris Solis MD    Primary Care Provider: Carlton Fry MD     Reason for Consult:  Acute Inpatient Rehabilitation    Chief Complaint: Leg pain, shortness of breath, neck pain    History of Present Illness:  Referring Provider is requesting an evaluation for appropriate placement upon discharge from acute care. Ms. Amalia Lockett is a 71 y. o.female who was admitted to [facility] on 3/16/2022 with Leg Pain, Shortness of Breath, Fall, and Neck Pain    67-year-old female with history anemia, spondylolisthesis, chronic DVT, chronic diastolic heart failure, CVA, major depressive disorder, status post cervical spine fusion, stenosis cervical spine with myelopathy and diabetes type 2 who presented with leg pain and shortness of breath and fall as well as neck pain she is on multiple falls recently. .  She had bilateral lower lobe edema has known history of DVT is currently on Eliquis. Internal medicine-awaiting placement, neurology consulted, MRI showed T2-3 and T5 7 moderate neural foraminal narrowing Lasix for swelling    Neurology-frequent falling episodes, cervical myelopathy status post decompression with residual left-sided weakness-recommended rehab    Neurosurgery last seen 3/4-ataxia left hand loss dexterity and falls new due to Parkinson's or other movement disorder had MRI done thoracic spine follow-up in 2 months    Radiology:  CT HEAD WO CONTRAST    Result Date: 3/17/2022  1. No acute intracranial abnormality. 2. Cerebral and cerebellar parenchymal volume loss with chronic microvascular white matter ischemic disease. CT CHEST W CONTRAST    Result Date: 3/17/2022  1. There are old left-sided rib fractures. There is some areas of callus along the left 8th and 9th rib fractures which could be subacute or chronic. Correlate with signs of pain in this region.  2. No other acute traumatic injury involving the chest. 3. Atherosclerotic disease. 4. Fatty liver. 5. Gallstone without adjacent inflammatory changes. 6. Right adrenal nodule, likely related to an adenoma, unchanged dating back to 2018, benign. CT CERVICAL SPINE WO CONTRAST    Result Date: 3/17/2022  No evidence of acute fracture in the cervical spine. MRI LUMBAR SPINE WO CONTRAST    Result Date: 3/18/2022  At L4-L5, moderate bilateral neural foraminal narrowing and changes related to instrumented disc space fusion and posterior fusion. Bilateral laminectomy defects grade 1-2 anterolisthesis. Mild-to-moderate bilateral neural foraminal narrowing At L5-S1,  bilateral laminectomy defects, mild bilateral neural foraminal narrowing     Review of Systems:  Constitutional: negative for anorexia, chills, fatigue, fevers, sweats and weight loss  Eyes: negative for redness and visual disturbance  Ears, nose, mouth, throat, and face: negative for earaches, sore throat and tinnitus  Respiratory: negative for cough and shortness of breath  Cardiovascular: negative for chest pain, dyspnea and palpitations  Gastrointestinal: negative for abdominal pain, change in bowel habits, constipation, nausea and vomiting  Genitourinary:negative for dysuria, frequency, hesitancy and urinary incontinence  Integument/breast: negative for pruritus and rash  Musculoskeletal:negative for muscle weakness and stiff joints  Neurological: negative for dizziness, headaches and weakness  Behavioral/Psych: negative for decreased appetite, depression and fatigue    Functional History:  PTA: Independent with all activities.     Current:  PT:  Restrictions/Precautions: General Precautions,Fall Risk  Implants present? : Metal implants (cervical and lumbar surgeries, L wrist ORIF)  Other position/activity restrictions: up as tolerated   Transfers  Sit to Stand: Minimal Assistance (cues for hand placement, increased time for hip ext)  Stand to sit: Minimal Assistance (cues for handplacement)  Bed to Chair: Minimal assistance  Stand Pivot Transfers: Minimal Assistance  Comment: x5 STS for strengthening, cues for good hand placement with standing and sitting. Increased time to complete, needs time to bring feet underneath trunk. LLE mainly needing repositioned        Ambulation  Ambulation?: Yes    Surface: level tile  Ambulation 1  Surface: level tile  Device: Rolling Walker  Assistance: Minimal assistance  Quality of Gait: slow tawanda, decreased step length on L, unsteady  Gait Deviations: Decreased step length,Decreased step height,Slow Tawanda,Shuffles (forward flexed posure, )  Distance: 84 ft & 20ft in room  Comments: Completed long distance out of room. Cues for approximation to 2WW. cues for increased step length and height but limited by SL stability in stance. completed a second walk in room to/from bathroom. Increased time for navigating tight spaces with increased shuffling steps      OT:   3/17    ADL  Feeding: Setup  Grooming: Setup  UE Bathing: Contact guard assistance  LE Bathing: Moderate assistance  UE Dressing: Contact guard assistance  LE Dressing: Moderate assistance  Toileting: Moderate assistance  Additional Comments: ADL scores based on skilled observation and clinical reasoning, unless otherwise noted. Assistance donning B socks. Assistance required due to impaired mobility/balance and fall risk, impacting safety and independence with self care    ST:      Past Medical History:        Diagnosis Date    Allergic rhinitis, cause unspecified     Back pain     lumbar    Bowel obstruction (HCC)     history of due to scar tissue, resolved non-surgically    C. difficile diarrhea     CAD (coronary artery disease)     no stent needed per pt.  Dr. Melinda Vargas did cath at  2005    Cardiac murmur     Cellulitis     left leg    Cellulitis 2017 August    leg left leg/bug bite    Cerebral artery occlusion with cerebral infarction [facility])     TIA 2014    COVID-19     ONE YR AGO IN 4/25/2020 fever and cough  Diverticulosis of colon (without mention of hemorrhage)     GERD (gastroesophageal reflux disease)     GERD (gastroesophageal reflux disease)     on rx    History of blood transfusion     approx 2020        History of CHF (congestive heart failure)     History of MI (myocardial infarction) 2005    thought due to a blood clot    History of ovarian cyst 1970    had oopherectomy holly    History of peritonitis 1968    due to ruptured appendix age 13    HTN (hypertension)     Hx of blood clots     right leg    Hyperlipidemia     Intestinal or peritoneal adhesions with obstruction (postoperative) (postinfection) ([address]     Kidney infection     renal failure/sepsis/spider bite    Lateral epicondylitis  of elbow     MDRO (multiple drug resistant organisms) resistance     c diff    Muscle strain     right posterior shoulder    Other abnormal glucose     PONV (postoperative nausea and vomiting)     dry heaves    Pre-diabetes     Restless legs syndrome (RLS)     Snores     no cpap    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)     TIA (transient ischemic attack) 2014    Uses walker     Vitamin D deficiency     Wears glasses     Wellness examination     last seen 2 weeks ago       Past Surgical History:        Procedure Laterality Date    ABDOMEN SURGERY  1976    benign tumor removed near remaining ovary, 1.5 pounds    APPENDECTOMY  1968    appendix ruptured, developed peritonitis    BACK SURGERY      BUNIONECTOMY Left     along with calcium deposits removed   [address]  2005    negative    CERVICAL FUSION  05/21/2021    POSTERIOR C3-6 LAMINECTOMY, PARTIAL C7 LAMINECTOMY, FUSION C3-C6, SILVERCORD    CERVICAL FUSION N/A 5/21/2021    POSTERIOR C3-6 LAMINECTOMY, PARTIAL C7 LAMINECTOMY, FUSION C3-C6, SILVERCORD performed by Vanessa Hansen, DO at [address]    12 INCHES REMOVED D/T OBSTRUCTION    COLONOSCOPY      CYST REMOVAL Right right facial    HYSTERECTOMY  1973    taken as a result of recurring cysts    LUMBAR FUSION N/A 02/10/2020    LUMBAR L4-5 POSTERIOR  DECOMPRESSION INSTRUMENTATION FUSION WCEMENT AUGMENTATION/ performed by Kalpesh Estrada MD at [address] N/A 06/17/2020    L5-S1 PLIF L4-L5 REVISION performed by Kalpesh Estrada MD at [address]  08/14/2014    FESS    OVARY REMOVAL  1970    UNILATERAL due to cyst    OVARY REMOVAL  1971    partial, due to cyst    SINUS SURGERY  2004    UPPER GASTROINTESTINAL ENDOSCOPY N/A 05/31/2019    EGD ESOPHAGOGASTRODUODENOSCOPY performed by Natasha Veronica MD at [facility] N/A 08/05/2019    EGD BIOPSY performed by Km Magdaleno MD at [facility] N/A 08/23/2019    EGD BIOPSY performed by Natasha Veronica MD at [address] [facility] 03/05/2019    WRIST OPEN REDUCTION INTERNAL FIXATION performed by Cleo Perales MD at [facility]:     Allergies   Allergen Reactions    Bactrim [Sulfamethoxazole-Trimethoprim] Other (See Comments)     sepsis    Codeine Itching    Seasonal         Current Medications:   Current Facility-Administered Medications: potassium chloride (KLOR-CON M) extended release tablet 40 mEq, 40 mEq, Oral, PRN **OR** potassium bicarb-citric acid (EFFER-K) effervescent tablet 40 mEq, 40 mEq, Oral, PRN **OR** potassium chloride 10 mEq/100 mL IVPB (Peripheral Line), 10 mEq, IntraVENous, PRN  sodium chloride flush 0.9 % injection 5-40 mL, 5-40 mL, IntraVENous, 2 times per day  sodium chloride flush 0.9 % injection 10 mL, 10 mL, IntraVENous, PRN  0.9 % sodium chloride infusion, 25 mL, IntraVENous, PRN  ondansetron (ZOFRAN-ODT) disintegrating tablet 4 mg, 4 mg, Oral, Q8H PRN **OR** ondansetron (ZOFRAN) injection 4 mg, 4 mg, IntraVENous, Q6H PRN  magnesium hydroxide (MILK OF MAGNESIA) 400 MG/5ML suspension 30 mL, 30 mL, Oral, Daily PRN  acetaminophen (TYLENOL) tablet 650 mg, 650 mg, Oral, Q6H PRN **OR** acetaminophen (TYLENOL) suppository 650 mg, 650 mg, Rectal, Q6H PRN  amLODIPine (NORVASC) tablet 10 mg, 10 mg, Oral, Daily  apixaban (ELIQUIS) tablet 5 mg, 5 mg, Oral, BID  atorvastatin (LIPITOR) tablet 40 mg, 40 mg, Oral, Nightly  carvedilol (COREG) tablet 12.5 mg, 12.5 mg, Oral, BID  citalopram (CELEXA) tablet 20 mg, 20 mg, Oral, Daily  vitamin B-12 (CYANOCOBALAMIN) tablet 1,000 mcg, 1,000 mcg, Oral, Daily  docusate sodium (COLACE) capsule 100 mg, 100 mg, Oral, BID PRN  fenofibrate (TRIGLIDE) tablet 160 mg, 160 mg, Oral, Daily  ferrous sulfate (IRON 325) tablet 325 mg, 325 mg, Oral, BID  lisinopril (PRINIVIL;ZESTRIL) tablet 30 mg, 30 mg, Oral, Daily  pramipexole (MIRAPEX) tablet 0.5 mg, 0.5 mg, Oral, Nightly  furosemide (LASIX) injection 40 mg, 40 mg, IntraVENous, BID  calcium carbonate w/vitamin D (CALTRATE) 600-400 MG-UNIT per tab 1 tablet, 1 tablet, Oral, Daily  cephALEXin (KEFLEX) capsule 500 mg, 500 mg, Oral, 2 times per day  gabapentin (NEURONTIN) capsule 100 mg, 100 mg, Oral, BID  sodium chloride flush 0.9 % injection 10 mL, 10 mL, IntraVENous, PRN    Social History:  Social History     Socioeconomic History    Marital status:      Spouse name: Not on file    Number of children: Not on file    Years of education: Not on file    Highest education level: Not on file   Occupational History    Occupation: retired   Tobacco Use    Smoking status: Former Smoker     Packs/day: 0.50     Years: 20.00     Pack years: 10.00     Types: Cigarettes     Start date: 1995     Quit date: 2017     Years since quittin.7    Smokeless tobacco: Never Used   Vaping Use    Vaping Use: Never used   Substance and Sexual Activity    Alcohol use: No     Alcohol/week: 0.0 standard drinks    Drug use: No    Sexual activity: Not on file   Other Topics Concern    Not on file   Social History Narrative    Not on file 32.7 03/20/2022    MCV 93.7 03/20/2022    RDW 14.2 03/20/2022     03/20/2022     BMP    Lab Results   Component Value Date     03/20/2022    K 4.1 03/20/2022     03/20/2022    CO2 24 03/20/2022    BUN 38 03/20/2022     Uric Acid  No components found for: URIC  VITAMIN B12 No components found for: B12  PT/INR  No results found for: PTINR      Impression: Ms. Americo Lockett is a 71 y.o. female with a history of Ataxia    1. Cervical myelopathy with history of decompression residual left-sided weakness  2. Spondylolisthesis  3. Chronic DVT-Eliquis  4. Chronic diastolic heart failure/hypertension/coronary disease/hyperlipidemia-Norvasc, Lipitor, Coreg, fenofibrate, Lasix IV, lisinopril  5. Questional history of CVA  6. Major depressive disorder-Celexa  7. History of cervical spine fusion 5/2021, lumbar fusion 2/20/2020 and 6/20/2020  8. Anemia-iron, B12  9. Lower extremity cellulitis-Keflex  10. Pain-Neurontin  11. Restless leg-Mirapex    Recommendations:  1. Diagnosis: Cervical myelopathy  2. Therapy: Has PT and OT needs  3. Medical  Necessity: As above  4. Support: Clarify,  is healthy however had CABG a year ago. Daughter assist with bathing and laundry at times  5. Rehab recommendation: Would benefit from acute inpatient rehabilitation when medically ready  6. DVT proph: Eliquis    It was my pleasure to evaluate Americo Lockett today. Please call with questions. Nitza Schneider. Emiliano Santizo MD          This note is created with the assistance of a speech recognition program.  While intending to generate a document that actually reflects the content of the visit, the document can still have some errors including those of syntax and sound a like substitutions which may escape proof reading.   In such instances, actual meaning can be extrapolated by contextual diversion

## 2022-03-21 NOTE — PROGRESS NOTES
Bedside reporting done, per Davis Coker RN  No c/o of pain, or SOB at this time  HOB up  @  30 degrees  Bed alarm on  Pt.  Rests peacefully

## 2022-03-21 NOTE — PROGRESS NOTES
[place] [address] [facility]    Progress Note    3/21/2022    12:39 PM    Name:   Crescencio Ibarra  MRN:     [id number]     Acct:      [de-identified]   Room:   2073/2073-01  IP Day:  0  Admit Date:  3/16/2022  8:17 PM    PCP:   Freddy Jacob MD  Code Status:  Full Code    Subjective:     C/C:   Chief Complaint   Patient presents with    Leg Pain    Shortness of Breath    Fall    Neck Pain         Interval History Status: Improving    HPI:     See HPI    Review of Systems:     Denies any shortness of breath or cough  Denies chest pain or palpitations  Denies abdominal pain, diarrhea vomiting  Denies any new numbness tremors or weakness. Medications: Allergies:     Allergies   Allergen Reactions    Bactrim [Sulfamethoxazole-Trimethoprim] Other (See Comments)     sepsis    Codeine Itching    Seasonal        Current Meds:   Scheduled Meds:    furosemide  40 mg Oral BID    sodium chloride flush  5-40 mL IntraVENous 2 times per day    amLODIPine  10 mg Oral Daily    apixaban  5 mg Oral BID    atorvastatin  40 mg Oral Nightly    carvedilol  12.5 mg Oral BID    citalopram  20 mg Oral Daily    cyanocobalamin  1,000 mcg Oral Daily    fenofibrate  160 mg Oral Daily    ferrous sulfate  325 mg Oral BID    lisinopril  30 mg Oral Daily    pramipexole  0.5 mg Oral Nightly    calcium carbonate w/vitamin D  1 tablet Oral Daily    cephALEXin  500 mg Oral 2 times per day    gabapentin  100 mg Oral BID     Continuous Infusions:    sodium chloride       PRN Meds: potassium chloride **OR** potassium alternative oral replacement **OR** potassium chloride, sodium chloride flush, sodium chloride, ondansetron **OR** ondansetron, magnesium hydroxide, acetaminophen **OR** acetaminophen, docusate sodium, sodium chloride flush    Data:     Past Medical History:   has a past medical history of Allergic rhinitis, cause unspecified, Back pain, Bowel obstruction (HCC), C. difficile diarrhea, CAD (coronary artery disease), Cardiac murmur, Cellulitis, Cellulitis, Cerebral artery occlusion with cerebral infarction ([address] COVID-19, Diverticulosis of colon (without mention of hemorrhage), GERD (gastroesophageal reflux disease), GERD (gastroesophageal reflux disease), History of blood transfusion, History of CHF (congestive heart failure), History of MI (myocardial infarction), History of ovarian cyst, History of peritonitis, HTN (hypertension), Hx of blood clots, Hyperlipidemia, Intestinal or peritoneal adhesions with obstruction (postoperative) (postinfection) ([address] Kidney infection, Lateral epicondylitis  of elbow, MDRO (multiple drug resistant organisms) resistance, Muscle strain, Other abnormal glucose, PONV (postoperative nausea and vomiting), Pre-diabetes, Restless legs syndrome (RLS), Snores, 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[address] TIA (transient ischemic attack), Uses walker, Vitamin D deficiency, Wears glasses, and Wellness examination. Social History:   reports that she quit smoking about 4 years ago. Her smoking use included cigarettes. She started smoking about 26 years ago. She has a 10.00 pack-year smoking history. She has never used smokeless tobacco. She reports that she does not drink alcohol and does not use drugs. Family History:   Family History   Problem Relation Age of Onset    Stroke Mother     Diabetes Mother     Heart Disease Mother     High Blood Pressure Mother     Heart Disease Father     Heart Disease Brother     High Blood Pressure Brother     Heart Disease Maternal Grandmother     High Blood Pressure Sister        Vitals:  BP (!) 124/50   Pulse 67   Temp 98.1 °F (36.7 °C)   Resp 16   Ht 5' 3\" (1.6 m)   Wt 179 lb 0.2 oz (81.2 kg)   SpO2 94%   BMI 31.71 kg/m²   Temp (24hrs), Av.2 °F (36.8 °C), Min:98.1 °F (36.7 °C), Max:98.2 °F (36.8 °C)    No results for input(s): POCGLU in the last 72 hours. I/O (24Hr):     Intake/Output Summary (Last 24 hours) at 3/21/2022 1239  Last data filed at 3/21/2022 0555  Gross per 24 hour   Intake 660 ml   Output 2200 ml   Net -1540 ml       Labs:    Lab Results   Component Value Date    WBC 6.3 03/20/2022    HGB 11.1 (L) 03/20/2022    HCT 32.7 (L) 03/20/2022    MCV 93.7 03/20/2022     03/20/2022     Lab Results   Component Value Date     03/20/2022    K 4.1 03/20/2022     03/20/2022    CO2 24 03/20/2022    BUN 38 03/20/2022    CREATININE 1.01 03/20/2022    GLUCOSE 108 03/20/2022    CALCIUM 8.6 03/20/2022          Lab Results   Component Value Date/Time    SPECIAL NOT REPORTED 05/21/2021 09:58 AM     Lab Results   Component Value Date/Time    CULTURE NO GROWTH 05/21/2021 09:58 AM         Radiology:    Recent data reviewed    Physical Examination:        General appearance:  alert, cooperative and no distress  Eyes: Anicteric sclera. Pupils are equally round and reactive to light. Extraocular movements are intact.   Lungs:  clear to auscultation bilaterally, normal effort  Heart:  regular rate and rhythm, no murmur  Abdomen:  soft, nontender, nondistended, normal bowel sounds, no masses, hepatomegaly, splenomegaly  Extremities:  no edema, redness, tenderness in the calves  Skin:  no gross lesions, rashes, induration  Neuro:  Alert, oriented X 3, no new focal weakness still has low back pain  Assessment:        Primary Problem  Ataxia    Active Hospital Problems    Diagnosis Date Noted    Cervical myelopathy ([facility] [address] [address] [G95.9] 03/17/2022    Frequent falls [R29.6]     Type 2 diabetes mellitus with circulatory disorder, without long-term current use of insulin ([address] [E11.59] 03/18/2019    Ataxia [R27.0] 08/05/2015    Peripheral edema [R60.9] 04/13/2015             Plan:        Presenting with multiple falls over last several weeks in the setting of chronic cervical spine stenosis with myelopathy status post cervical fusion follow neurosurgery  Recently started on Eliquis for acute DVT right leg, complaining of right leg pain  Trauma work-up CT brain negative in ER  Multiple remote and healing rib fractures noted  Uses walker at home  Neurology consulted for worsening gait instability-reset MRI thoracic spine did show T2-T3 and T5-T7 moderate neural foraminal narrowing  PT OT consult, will need placement     Her last fall was related to post void dizziness    Bilateral leg swelling, skin excoriation with surrounding redness/early cellulitis-no fever no leukocytosis-we will startabx    3/18  Awaiting MRI of lumbar spine appreciate neurology recommendations  Diuresing on IV Lasix -leg swelling is better  PT OT and PMNR consulted for discharge planning    3/19  Mild chronic changes on MRI  Awaiting placement to rehab    3/20  No acute issues overnight  awaitng placement     March 21,  presented with multiple falls, chronic cervical spine stenosis with myelopathy,  Acute DVT of the right leg on Eliquis,  Trauma work-up was negative,  Multiple remote and healing rib fractures,  Neurology on board,  MRI showed some neural foraminal narrowing, PT OT working, going to acute rehab,  Creatinine going up, discontinue IV Lasix changed to p.o.,            Sandee Werner MD  3/21/2022  12:39 PM

## 2022-03-22 PROCEDURE — G0378 HOSPITAL OBSERVATION PER HR: HCPCS

## 2022-03-22 PROCEDURE — 6370000000 HC RX 637 (ALT 250 FOR IP): Performed by: INTERNAL MEDICINE

## 2022-03-22 PROCEDURE — 6370000000 HC RX 637 (ALT 250 FOR IP): Performed by: PSYCHIATRY & NEUROLOGY

## 2022-03-22 PROCEDURE — 6370000000 HC RX 637 (ALT 250 FOR IP): Performed by: NURSE PRACTITIONER

## 2022-03-22 PROCEDURE — 2580000003 HC RX 258: Performed by: NURSE PRACTITIONER

## 2022-03-22 PROCEDURE — 97116 GAIT TRAINING THERAPY: CPT

## 2022-03-22 PROCEDURE — 99225 PR SBSQ OBSERVATION CARE/DAY 25 MINUTES: CPT | Performed by: INTERNAL MEDICINE

## 2022-03-22 PROCEDURE — 97535 SELF CARE MNGMENT TRAINING: CPT

## 2022-03-22 RX ADMIN — LISINOPRIL 30 MG: 20 TABLET ORAL at 09:05

## 2022-03-22 RX ADMIN — SODIUM CHLORIDE, PRESERVATIVE FREE 10 ML: 5 INJECTION INTRAVENOUS at 09:05

## 2022-03-22 RX ADMIN — PRAMIPEXOLE DIHYDROCHLORIDE 0.5 MG: 0.25 TABLET ORAL at 20:22

## 2022-03-22 RX ADMIN — ATORVASTATIN CALCIUM 40 MG: 40 TABLET, FILM COATED ORAL at 20:18

## 2022-03-22 RX ADMIN — METRONIDAZOLE 100 MG: 500 TABLET ORAL at 20:17

## 2022-03-22 RX ADMIN — FENOFIBRATE 160 MG: 160 TABLET ORAL at 09:05

## 2022-03-22 RX ADMIN — CARVEDILOL 12.5 MG: 12.5 TABLET, FILM COATED ORAL at 09:05

## 2022-03-22 RX ADMIN — APIXABAN 5 MG: 5 TABLET, FILM COATED ORAL at 09:16

## 2022-03-22 RX ADMIN — APIXABAN 5 MG: 5 TABLET, FILM COATED ORAL at 20:17

## 2022-03-22 RX ADMIN — METRONIDAZOLE 100 MG: 500 TABLET ORAL at 09:05

## 2022-03-22 RX ADMIN — CITALOPRAM HYDROBROMIDE 20 MG: 20 TABLET ORAL at 09:05

## 2022-03-22 RX ADMIN — CALCIUM CARBONATE 600 MG (1,500 MG)-VITAMIN D3 400 UNIT TABLET 1 TABLET: at 09:05

## 2022-03-22 RX ADMIN — CEPHALEXIN 500 MG: 500 CAPSULE ORAL at 20:18

## 2022-03-22 RX ADMIN — SODIUM CHLORIDE, PRESERVATIVE FREE 10 ML: 5 INJECTION INTRAVENOUS at 20:18

## 2022-03-22 RX ADMIN — FERROUS SULFATE TAB 325 MG (65 MG ELEMENTAL FE) 325 MG: 325 (65 FE) TAB at 20:17

## 2022-03-22 RX ADMIN — CEPHALEXIN 500 MG: 500 CAPSULE ORAL at 09:05

## 2022-03-22 RX ADMIN — FUROSEMIDE 40 MG: 40 TABLET ORAL at 17:55

## 2022-03-22 RX ADMIN — CARVEDILOL 12.5 MG: 12.5 TABLET, FILM COATED ORAL at 20:18

## 2022-03-22 RX ADMIN — CYANOCOBALAMIN TAB 1000 MCG 1000 MCG: 1000 TAB at 09:05

## 2022-03-22 RX ADMIN — FUROSEMIDE 40 MG: 40 TABLET ORAL at 09:05

## 2022-03-22 RX ADMIN — ACETAMINOPHEN 650 MG: 325 TABLET, FILM COATED ORAL at 20:22

## 2022-03-22 RX ADMIN — AMLODIPINE BESYLATE 10 MG: 5 TABLET ORAL at 09:15

## 2022-03-22 RX ADMIN — FERROUS SULFATE TAB 325 MG (65 MG ELEMENTAL FE) 325 MG: 325 (65 FE) TAB at 09:05

## 2022-03-22 ASSESSMENT — PAIN DESCRIPTION - LOCATION
LOCATION: RIB CAGE

## 2022-03-22 ASSESSMENT — PAIN SCALES - GENERAL
PAINLEVEL_OUTOF10: 8
PAINLEVEL_OUTOF10: 7
PAINLEVEL_OUTOF10: 8
PAINLEVEL_OUTOF10: 8

## 2022-03-22 ASSESSMENT — PAIN DESCRIPTION - DESCRIPTORS
DESCRIPTORS: SHARP
DESCRIPTORS: SHARP

## 2022-03-22 ASSESSMENT — PAIN DESCRIPTION - PAIN TYPE
TYPE: ACUTE PAIN

## 2022-03-22 ASSESSMENT — PAIN DESCRIPTION - ORIENTATION
ORIENTATION: RIGHT

## 2022-03-22 ASSESSMENT — PAIN DESCRIPTION - FREQUENCY: FREQUENCY: INTERMITTENT

## 2022-03-22 NOTE — PLAN OF CARE
Laceration Care, Adult  A laceration is a cut that goes through all of the layers of the skin and into the tissue that is right under the skin. Some lacerations heal on their own. Others need to be closed with stitches (sutures), staples, skin adhesive strips, or skin glue. Proper laceration care minimizes the risk of infection and helps the laceration to heal better.  HOW TO CARE FOR YOUR LACERATION  If sutures or staples were used:  · Keep the wound clean and dry.  · If you were given a bandage (dressing), you should change it at least one time per day or as told by your health care provider. You should also change it if it becomes wet or dirty.  · Keep the wound completely dry for the first 24 hours or as told by your health care provider. After that time, you may shower or bathe. However, make sure that the wound is not soaked in water until after the sutures or staples have been removed.  · Clean the wound one time each day or as told by your health care provider:  ¨ Wash the wound with soap and water.  ¨ Rinse the wound with water to remove all soap.  ¨ Pat the wound dry with a clean towel. Do not rub the wound.  · After cleaning the wound, apply a thin layer of antibiotic ointment as told by your health care provider. This will help to prevent infection and keep the dressing from sticking to the wound.  · Have the sutures or staples removed as told by your health care provider.  If skin adhesive strips were used:  · Keep the wound clean and dry.  · If you were given a bandage (dressing), you should change it at least one time per day or as told by your health care provider. You should also change it if it becomes dirty or wet.  · Do not get the skin adhesive strips wet. You may shower or bathe, but be careful to keep the wound dry.  · If the wound gets wet, pat it dry with a clean towel. Do not rub the wound.  · Skin adhesive strips fall off on their own. You may trim the strips as the wound heals. Do not  Problem: Pain:  Goal: Pain level will decrease  Description: Pain level will decrease  3/22/2022 0421 by Cierra Pollock RN  Outcome: Ongoing     Problem: Pain:  Goal: Pain level will decrease  Description: Pain level will decrease  3/22/2022 0421 by Cierra Pollock RN  Outcome: Ongoing     Problem: Pain:  Goal: Control of acute pain  Description: Control of acute pain  3/22/2022 0421 by Cierra Pollock RN  Outcome: Ongoing     Problem: Pain:  Goal: Control of chronic pain  Description: Control of chronic pain  Outcome: Ongoing     Problem: Falls - Risk of:  Goal: Will remain free from falls  Description: Will remain free from falls  3/22/2022 0421 by Cierra Pollock RN  Outcome: Ongoing     Problem: Falls - Risk of:  Goal: Absence of physical injury  Description: Absence of physical injury  Outcome: Ongoing     Problem: Neurological  Goal: Maximum potential motor/sensory/cognitive function  Outcome: Ongoing remove skin adhesive strips that are still stuck to the wound. They will fall off in time.  If skin glue was used:  · Try to keep the wound dry, but you may briefly wet it in the shower or bath. Do not soak the wound in water, such as by swimming.  · After you have showered or bathed, gently pat the wound dry with a clean towel. Do not rub the wound.  · Do not do any activities that will make you sweat heavily until the skin glue has fallen off on its own.  · Do not apply liquid, cream, or ointment medicine to the wound while the skin glue is in place. Using those may loosen the film before the wound has healed.  · If you were given a bandage (dressing), you should change it at least one time per day or as told by your health care provider. You should also change it if it becomes dirty or wet.  · If a dressing is placed over the wound, be careful not to apply tape directly over the skin glue. Doing that may cause the glue to be pulled off before the wound has healed.  · Do not pick at the glue. The skin glue usually remains in place for 5-10 days, then it falls off of the skin.  General Instructions  · Take over-the-counter and prescription medicines only as told by your health care provider.  · If you were prescribed an antibiotic medicine or ointment, take or apply it as told by your doctor. Do not stop using it even if your condition improves.  · To help prevent scarring, make sure to cover your wound with sunscreen whenever you are outside after stitches are removed, after adhesive strips are removed, or when glue remains in place and the wound is healed. Make sure to wear a sunscreen of at least 30 SPF.  · Do not scratch or pick at the wound.  · Keep all follow-up visits as told by your health care provider. This is important.  · Check your wound every day for signs of infection. Watch for:  ¨ Redness, swelling, or pain.  ¨ Fluid, blood, or pus.  · Raise (elevate) the injured area above the level of your heart  while you are sitting or lying down, if possible.  SEEK MEDICAL CARE IF:  · You received a tetanus shot and you have swelling, severe pain, redness, or bleeding at the injection site.  · You have a fever.  · A wound that was closed breaks open.  · You notice a bad smell coming from your wound or your dressing.  · You notice something coming out of the wound, such as wood or glass.  · Your pain is not controlled with medicine.  · You have increased redness, swelling, or pain at the site of your wound.  · You have fluid, blood, or pus coming from your wound.  · You notice a change in the color of your skin near your wound.  · You need to change the dressing frequently due to fluid, blood, or pus draining from the wound.  · You develop a new rash.  · You develop numbness around the wound.  SEEK IMMEDIATE MEDICAL CARE IF:  · You develop severe swelling around the wound.  · Your pain suddenly increases and is severe.  · You develop painful lumps near the wound or on skin that is anywhere on your body.  · You have a red streak going away from your wound.  · The wound is on your hand or foot and you cannot properly move a finger or toe.  · The wound is on your hand or foot and you notice that your fingers or toes look pale or bluish.     This information is not intended to replace advice given to you by your health care provider. Make sure you discuss any questions you have with your health care provider.     Document Released: 12/18/2006 Document Revised: 05/03/2016 Document Reviewed: 12/14/2015  [employer] Patient Education ©2016 [employer]

## 2022-03-22 NOTE — PROGRESS NOTES
[facility]: [facility]   INPATIENT OCCUPATIONAL THERAPY  PROGRESS NOTE  Date: 3/22/2022  Patient Name: Joel Ponce      Room: 1080/6107-43  MRN: [id number]    : 1950  (71 y.o.) Gender: female     Discharge Recommendations: The patient would benefit from an intensive level of therapy after discharge from the facility. They should be able to tolerate 3-hours of Combined OT/PT/ST over 5 days/week or at least 900 minutes of  Combined Therapy over 7 days. Equipment Needed:  (TBD)    Referring Practitioner: Miguel Cifuentes MD  Diagnosis: Ataxia  General  Chart Reviewed: Yes,Progress Notes  Patient assessed for rehabilitation services?: Yes  Additional Pertinent Hx: 71 y.o.  female, with a history of anemia, spondylolisthesis, chronic DVT, chronic diastolic heart failure, CVA, major depressive disorder, s/p cervical spine fusion, stenosis of cervical spine with myelopathy, and DM type II, who presents with leg pain, shortness of breath, fall, and neck pain. According to patient and her , patient has had multiple falls recently including a fall Monday and evening and again on Tuesday. Patient reports that today she felt extremely weak upon waking. Response to previous treatment: Patient with no complaints from previous session  Family / Caregiver Present: Yes (spouse)  Referring Practitioner: Miguel Cifuentes MD  Diagnosis: Ataxia    Restrictions  Restrictions/Precautions: General Precautions,Fall Risk  Implants present? : Metal implants (cervical and lumbar surgeries, L wrist ORIF)  Other position/activity restrictions: up as tolerated  Required Braces or Orthoses?: No      Subjective  Subjective: \"This foot gives me trouble\" referring to L foot. Patient's L foot slides forward during initial transfer, requires OT assist to block L foot.   Patient Currently in Pain: Yes  Pain Level: 8  Pain Location: Rib cage  Pain Orientation: Right        Pain Assessment  Pain Assessment: 0-10  Pain Level: 8  Patient's Stated Pain Goal: No pain  Pain Type: Acute pain  Pain Location: Rib cage  Pain Orientation: Right  Pain Descriptors: Sharp  Pain Frequency: Intermittent  Clinical Progression:  (with movement)    Objective  Cognition  Overall Cognitive Status: WFL  Bed mobility  Comment: Patient seated in chair at start and end of session  Balance  Sitting Balance: Stand by assistance  Standing Balance: Minimal assistance     Functional Mobility  Functional - Mobility Device: Rolling Walker  Activity: To/from bathroom  Assist Level: Minimal assistance  Functional Mobility Comments: assistance with RW positioning   ADL  Feeding: Setup  Grooming: Setup (demonstrates with hair brush)  UE Bathing: Stand by assistance  LE Bathing: Moderate assistance (reports assist B lower legs/feet)  UE Dressing: Stand by assistance  LE Dressing: Moderate assistance  Toileting: Moderate assistance  Additional Comments: ADL scores based on skilled observations, clinical reasoning, and patient report unless otherwise noted. Assistance to doff/don socks this date as well as assist clothing mangement during toileting. Transfers  Sit to stand: Minimal assistance  Stand to sit: Minimal assistance  Transfer Comments: Minimal verbal cues for hand placement and safety; assist at L foot during tranfsers  Toilet Transfers  Toilet - Technique: Ambulating  Equipment Used: Grab bars  Toilet Transfer: Minimal assistance  Toilet Transfers Comments: with RW                             Assessment  Performance deficits / Impairments: Decreased functional mobility ; Decreased ADL status; Decreased strength;Decreased safe awareness;Decreased endurance;Decreased balance;Decreased sensation;Decreased high-level IADLs  Discharge Recommendations: Patient would benefit from continued therapy after discharge  Activity Tolerance: Patient Tolerated treatment well  Safety Devices in place: Yes  Type of devices:  All fall risk precautions in place;Call light within reach;Gait belt;Patient at risk for falls; Left in chair;Nurse notified (spouse present with patient)             Patient Education: OT POC, safety with self-care tasks     Learner:patient and significant other  Method: demonstration and explanation       Outcome: needs reinforcement and asked questions     Plan  Safety Devices  Safety Devices in place: Yes  Type of devices:  All fall risk precautions in place,Call light within reach,Gait belt,Patient at risk for falls,Left in chair,Nurse notified (spouse present with patient)  Plan  Times per week: 5-7  Current Treatment Recommendations: Balance Training,Functional Mobility Training,Endurance Training,Strengthening,ROM,Safety Education & Training,Patient/Caregiver Education & Training,Equipment Evaluation, Education, & procurement,Self-Care / ADL      Goals  Short term goals  Time Frame for Short term goals: By discharge  Short term goal 1: Pt will perform BADLs with supervision and Good safety  Short term goal 2: Pt will V/D use of AE/AS to increase independence with LB self care  Short term goal 3: Pt will perform transfers/functional mobility supervision and Good safety  Short term goal 4: Pt will tolerate dynamic standing, 8+ minutes, while engaged in self care/functional activities  Short term goal 5: Pt will actively participate in 15+ minutes of therapeutic exercise/functional activities to increase independence with self care and mobility    OT Individual Minutes  Time In: 1046  Time Out: [address]  Minutes: 12      Electronically signed by Dotty Jalloh OT on 3/22/22 at 11:21 AM EDT

## 2022-03-22 NOTE — PROGRESS NOTES
[facility] Internal Medicine    Progress Note    3/22/2022    4:30 PM    Name:   Yi Pereira  MRN:     [id number]     Acct:      [de-identified]   Room:   2073/2073-01   Day:  0  Admit Date:  3/16/2022  8:17 PM    PCP:   Freddy Jacob MD  Code Status:  Full Code    Subjective:     C/C:   Chief Complaint   Patient presents with    Leg Pain    Shortness of Breath    Fall    Neck Pain         Interval History Status: Improving    HPI:     See HPI    Review of Systems:     Denies any shortness of breath or cough  Denies chest pain or palpitations  Denies abdominal pain, diarrhea vomiting  Denies any new numbness tremors or weakness. Medications: Allergies:     Allergies   Allergen Reactions    Bactrim [Sulfamethoxazole-Trimethoprim] Other (See Comments)     sepsis    Codeine Itching    Seasonal        Current Meds:   Scheduled Meds:    furosemide  40 mg Oral BID    sodium chloride flush  5-40 mL IntraVENous 2 times per day    amLODIPine  10 mg Oral Daily    apixaban  5 mg Oral BID    atorvastatin  40 mg Oral Nightly    carvedilol  12.5 mg Oral BID    citalopram  20 mg Oral Daily    cyanocobalamin  1,000 mcg Oral Daily    fenofibrate  160 mg Oral Daily    ferrous sulfate  325 mg Oral BID    lisinopril  30 mg Oral Daily    pramipexole  0.5 mg Oral Nightly    calcium carbonate w/vitamin D  1 tablet Oral Daily    cephALEXin  500 mg Oral 2 times per day    gabapentin  100 mg Oral BID     Continuous Infusions:    sodium chloride       PRN Meds: potassium chloride **OR** potassium alternative oral replacement **OR** potassium chloride, sodium chloride flush, sodium chloride, ondansetron **OR** ondansetron, magnesium hydroxide, acetaminophen **OR** acetaminophen, docusate sodium, sodium chloride flush    Data:     Past Medical History:   has a past medical history of Allergic rhinitis, cause unspecified, Back pain, Bowel obstruction (HCC), C. difficile diarrhea, CAD (coronary artery disease), Cardiac murmur, Cellulitis, Cellulitis, Cerebral artery occlusion with cerebral infarction ([address] COVID-19, Diverticulosis of colon (without mention of hemorrhage), GERD (gastroesophageal reflux disease), GERD (gastroesophageal reflux disease), History of blood transfusion, History of CHF (congestive heart failure), History of MI (myocardial infarction), History of ovarian cyst, History of peritonitis, HTN (hypertension), Hx of blood clots, Hyperlipidemia, Intestinal or peritoneal adhesions with obstruction (postoperative) (postinfection) ([address] Kidney infection, Lateral epicondylitis  of elbow, MDRO (multiple drug resistant organisms) resistance, Muscle strain, Other abnormal glucose, PONV (postoperative nausea and vomiting), Pre-diabetes, Restless legs syndrome (RLS), Snores, 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[address] TIA (transient ischemic attack), Uses walker, Vitamin D deficiency, Wears glasses, and Wellness examination. Social History:   reports that she quit smoking about 4 years ago. Her smoking use included cigarettes. She started smoking about 26 years ago. She has a 10.00 pack-year smoking history. She has never used smokeless tobacco. She reports that she does not drink alcohol and does not use drugs. Family History:   Family History   Problem Relation Age of Onset    Stroke Mother     Diabetes Mother     Heart Disease Mother     High Blood Pressure Mother     Heart Disease Father     Heart Disease Brother     High Blood Pressure Brother     Heart Disease Maternal Grandmother     High Blood Pressure Sister        Vitals:  BP (!) 135/97   Pulse 67   Temp 98.4 °F (36.9 °C) (Oral)   Resp 16   Ht 5' 3\" (1.6 m)   Wt 179 lb 0.2 oz (81.2 kg)   SpO2 96%   BMI 31.71 kg/m²   Temp (24hrs), Av.2 °F (36.8 °C), Min:97.5 °F (36.4 °C), Max:98.8 °F (37.1 °C)    No results for input(s): POCGLU in the last 72 hours. I/O (24Hr):     Intake/Output Summary (Last 24 hours) at 3/22/2022 1630  Last data filed at 3/22/2022 8921  Gross per 24 hour   Intake 335 ml   Output 1025 ml   Net -690 ml       Labs:    Lab Results   Component Value Date    WBC 6.3 03/20/2022    HGB 11.1 (L) 03/20/2022    HCT 32.7 (L) 03/20/2022    MCV 93.7 03/20/2022     03/20/2022     Lab Results   Component Value Date     03/20/2022    K 4.1 03/20/2022     03/20/2022    CO2 24 03/20/2022    BUN 38 03/20/2022    CREATININE 1.01 03/20/2022    GLUCOSE 108 03/20/2022    CALCIUM 8.6 03/20/2022          Lab Results   Component Value Date/Time    SPECIAL NOT REPORTED 05/21/2021 09:58 AM     Lab Results   Component Value Date/Time    CULTURE NO GROWTH 05/21/2021 09:58 AM         Radiology:    Recent data reviewed    Physical Examination:        General appearance:  alert, cooperative and no distress  Eyes: Anicteric sclera. Pupils are equally round and reactive to light. Extraocular movements are intact.   Lungs:  clear to auscultation bilaterally, normal effort  Heart:  regular rate and rhythm, no murmur  Abdomen:  soft, nontender, nondistended, normal bowel sounds, no masses, hepatomegaly, splenomegaly  Extremities:  no edema, redness, tenderness in the calves  Skin:  no gross lesions, rashes, induration  Neuro:  Alert, oriented X 3, no new focal weakness still has low back pain  Assessment:        Primary Problem  Ataxia    Active Hospital Problems    Diagnosis Date Noted    Cervical myelopathy ([facility] [G95.9] 03/17/2022    Frequent falls [R29.6]     Type 2 diabetes mellitus with circulatory disorder, without long-term current use of insulin ([facility] [E11.59] 03/18/2019    Ataxia [R27.0] 08/05/2015    Peripheral edema [R60.9] 04/13/2015             Plan:        Presenting with multiple falls over last several weeks in the setting of chronic cervical spine stenosis with myelopathy status post cervical fusion follow neurosurgery  Recently started on Eliquis for acute DVT right leg, complaining of right leg pain  Trauma work-up CT brain negative in ER  Multiple remote and healing rib fractures noted  Uses walker at home  Neurology consulted for worsening gait instability-reset MRI thoracic spine did show T2-T3 and T5-T7 moderate neural foraminal narrowing  PT OT consult, will need placement     Her last fall was related to post void dizziness    Bilateral leg swelling, skin excoriation with surrounding redness/early cellulitis-no fever no leukocytosis-we will startabx    3/18  Awaiting MRI of lumbar spine appreciate neurology recommendations  Diuresing on IV Lasix -leg swelling is better  PT OT and PMNR consulted for discharge planning    3/19  Mild chronic changes on MRI  Awaiting placement to rehab    3/20  No acute issues overnight  awaitng placement     March 21,  presented with multiple falls, chronic cervical spine stenosis with myelopathy,  Acute DVT of the right leg on Eliquis,  Trauma work-up was negative,  Multiple remote and healing rib fractures,  Neurology on board,  MRI showed some neural foraminal narrowing, PT OT working, going to acute rehab,  Creatinine going up, discontinue IV Lasix changed to p.o.,      March 22,  presented with multiple falls, chronic cervical spine stenosis with myelopathy,  Acute DVT of the right leg on Eliquis,  Trauma work-up was negative,  Multiple remote and healing rib fractures,  Neurology on board,  MRI showed some neural foraminal narrowing, PT OT working, going to acute rehab,  Lab next am Lucina Campbell MD  3/22/2022  4:30 PM

## 2022-03-22 NOTE — PROGRESS NOTES
Physical Therapy  Facility/Department: [facility] MED SURG  Daily Treatment Note  NAME: Katja Vargas  : 1950  MRN: [id number]    Date of Service: 3/22/2022    Discharge Recommendations:  Patient would benefit from continued therapy after discharge   PT Equipment Recommendations  Other: TBD    Assessment   Body structures, Functions, Activity limitations: Decreased functional mobility ; Decreased ADL status; Decreased ROM; Decreased strength;Decreased endurance;Decreased balance  Assessment: Completed treatment session consisting of strengthening, ambulation, and standing tolerance. Pt continues to need Jennifer to CGA for transfers and mobility due strength, balance, and coordination deficit. Difficulty in motor planning and adjusting movements. She is a high fall risk and would benefit from continued PT to ensure she is safe and highest level of independence for mobility   Treatment Diagnosis: Impaired functional mobility 2* ataxia  Specific instructions for Next Treatment: transfers, amb, balance, standing program  Prognosis: Good  Decision Making: Medium Complexity  Exam: ROM, MMT, bed mobility, transfers, amb  Clinical Presentation: Pt alert, cooperative, pleasant  Barriers to Learning: none  REQUIRES PT FOLLOW UP: Yes  Activity Tolerance  Activity Tolerance: Patient Tolerated treatment well     Patient Diagnosis(es): The encounter diagnosis was Frequent falls.      has a past medical history of Allergic rhinitis, cause unspecified, Back pain, Bowel obstruction (HCC), C. difficile diarrhea, CAD (coronary artery disease), Cardiac murmur, Cellulitis, Cellulitis, Cerebral artery occlusion with cerebral infarction ([facility] COVID-19, Diverticulosis of colon (without mention of hemorrhage), GERD (gastroesophageal reflux disease), GERD (gastroesophageal reflux disease), History of blood transfusion, History of CHF (congestive heart failure), History of MI (myocardial infarction), History of ovarian cyst, History of peritonitis, HTN (hypertension), Hx of blood clots, Hyperlipidemia, Intestinal or peritoneal adhesions with obstruction (postoperative) (postinfection) ([address] Kidney infection, Lateral epicondylitis  of elbow, MDRO (multiple drug resistant organisms) resistance, Muscle strain, Other abnormal glucose, PONV (postoperative nausea and vomiting), Pre-diabetes, Restless legs syndrome (RLS), Snores, 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[address] TIA (transient ischemic attack), Uses walker, Vitamin D deficiency, Wears glasses, and Wellness examination. has a past surgical history that includes Ovary removal (1970); colectomy (4448); Appendectomy (1968); Ovary removal (1971); Hysterectomy (1973); Bunionectomy (Left); sinus surgery (2004); Colonoscopy; other surgical history (08/14/2014); cyst removal (Right); Wrist fracture surgery (Left, 03/05/2019); Upper gastrointestinal endoscopy (N/A, 05/31/2019); Upper gastrointestinal endoscopy (N/A, 08/05/2019); Upper gastrointestinal endoscopy (N/A, 08/23/2019); Abdomen surgery (1976); lumbar fusion (N/A, 02/10/2020); Cardiac catheterization; Cardiac catheterization (2005); [place] tooth extraction; lumbar fusion (N/A, 06/17/2020); back surgery; cervical fusion (05/21/2021); and cervical fusion (N/A, 5/21/2021). Restrictions  Restrictions/Precautions  Restrictions/Precautions: General Precautions,Fall Risk  Required Braces or Orthoses?: No  Implants present? : Metal implants (cervical and lumbar surgeries, L wrist ORIF)  Position Activity Restriction  Other position/activity restrictions: up as tolerated  Subjective   General  Chart Reviewed: Yes  Additional Pertinent Hx: TIA  Family / Caregiver Present: No  Referring Practitioner: Durga Grant MD  Subjective  Subjective: 1st attempt at 10:45am. OT came in and needed a stat eval, and tx was transfered to OT. 2nd Attempt 11:33am, pt had a visitor that was leaving as PT came in.  Pt was up in chair and willing to do therapy until lunch.  General Comment  Comments: PT tx shortened due to lunch arriving. Pain Screening  Patient Currently in Pain: Yes  Pain Assessment  Pain Assessment: 0-10  Pain Level: 8  Pain Type: Acute pain  Pain Location: Rib cage  Pain Orientation: Right  Pain Descriptors: Sharp  Vital Signs  Level of Consciousness: Alert (0)  Patient Currently in Pain: Yes  Oxygen Therapy  O2 Device: None (Room air)  Patient Observation  Observations: L sided weakness, scabbing/erythema on BLE (Distally)       Orientation  Orientation  Overall Orientation Status: Within Normal Limits       Objective      Transfers  Sit to Stand: Minimal Assistance  Stand to sit: Contact guard assistance  Stand Pivot Transfers: Contact guard assistance  Comment: Pt is Min A x1 for STS from chair in room. Pt heavily relys on UE and needs cueing to postion and use LE. Pt is CGA x1 for stand to sit and stand pivots. Pt needs verbal and tactile cueing for technique and hand placements. Ambulation  Ambulation?: Yes  Ambulation 1  Surface: level tile  Device: Rolling Walker  Assistance: Contact guard assistance  Quality of Gait: slow tawnada with step to pattern. Pt favors LLE and has lack of weight shifting, with downward gaze and cervical flexion. Gait Deviations: Decreased step length;Decreased step height;Shuffles; Slow Tawanda  Distance: 50ft x1  Comments: Pt is CGA x1 for safety due to compinsation of  LLE and poor endurance. Pt has decreased step length and height with intermittent shuffling gait. Pt needs increase time for amb due to slow tawanda. Pt had good carryover with verbal cueing of up lifting gaze, and good carryover for tactile cue to shifting weight in LE.   Stairs/Curb  Stairs?: No     Balance  Posture: Good  Sitting - Static: Good  Sitting - Dynamic: Good;-  Standing - Static: Fair (at [employer])  Standing - Dynamic: Fair;- (at [employer])  Exercises  Knee Long Arc Quad: x20 in chair  Ankle Pumps: x20 in chair  Other exercises  Other exercises?: Yes  Other exercises 1: Edu on ARU setting and what pt can expect if she is DC there. Other exercises 2: Edu on why it is improtant to reposition and be as mobile as possible to keep progressing towards strengtheing goals. Other exercises 3: Edu on impotrance of posture when eating and drinking and also for safety when amb in hallway. Goals  Short term goals  Time Frame for Short term goals: 5 days  Short term goal 1: Pt to demo bed mobility SBA. Short term goal 2: Pt to perform transfers CGA. Short term goal 3: pt to amb 50'-75' with device, CGA. Short term goal 4: Pt to improve BLE strength by 1/2 MMG. Short term goal 5: Pt to improve standing balance to SANDJORD. Patient Goals   Patient goals : To get stronger    Plan    Plan  Times per week: 5-6x/week  Specific instructions for Next Treatment: transfers, amb, balance, standing program  Current Treatment Recommendations: Strengthening,Balance Training,Functional Mobility Training,Transfer Training,Endurance Training,Gait Training,Equipment Evaluation, Education, & procurement,Patient/Caregiver Education & Training,Safety Education & Training  Safety Devices  Type of devices: All fall risk precautions in place,Call light within reach,Gait belt,Left in chair,Chair alarm in place     Therapy Time   Individual Concurrent Group Co-treatment   Time In 1133         Time Out 1148         Minutes 15               Electronically signed by PRAVIN Rios// on 3/22/2022 at 1:44 PM  The above documentation completed by Student Physical Therapist Assistant was provided under the direct line of sight by supervision. Documentation was reviewed and accepted by supervising Clinical Instructor [facility], [facility].

## 2022-03-23 ENCOUNTER — HOSPITAL ENCOUNTER (INPATIENT)
Age: 72
LOS: 20 days | Discharge: HOME HEALTH | DRG: 949 | End: 2022-04-12
Attending: PHYSICAL MEDICINE & REHABILITATION | Admitting: PHYSICAL MEDICINE & REHABILITATION
Payer: MEDICARE

## 2022-03-23 ENCOUNTER — APPOINTMENT (OUTPATIENT)
Dept: GENERAL RADIOLOGY | Age: 72
End: 2022-03-23
Payer: MEDICARE

## 2022-03-23 VITALS
HEIGHT: 63 IN | DIASTOLIC BLOOD PRESSURE: 46 MMHG | TEMPERATURE: 97.7 F | SYSTOLIC BLOOD PRESSURE: 127 MMHG | BODY MASS INDEX: 31.72 KG/M2 | OXYGEN SATURATION: 92 % | HEART RATE: 71 BPM | WEIGHT: 179.01 LBS | RESPIRATION RATE: 14 BRPM

## 2022-03-23 DIAGNOSIS — G95.9 CERVICAL MYELOPATHY (HCC): Primary | ICD-10-CM

## 2022-03-23 DIAGNOSIS — R22.43 LOCALIZED SWELLING OF BOTH LOWER LEGS: ICD-10-CM

## 2022-03-23 DIAGNOSIS — I50.32 CHRONIC DIASTOLIC HEART FAILURE (HCC): ICD-10-CM

## 2022-03-23 DIAGNOSIS — Z98.1 S/P CERVICAL SPINAL FUSION: ICD-10-CM

## 2022-03-23 DIAGNOSIS — F32.A DEPRESSION, UNSPECIFIED DEPRESSION TYPE: ICD-10-CM

## 2022-03-23 DIAGNOSIS — E78.5 HYPERLIPIDEMIA, UNSPECIFIED HYPERLIPIDEMIA TYPE: ICD-10-CM

## 2022-03-23 DIAGNOSIS — G25.81 RLS (RESTLESS LEGS SYNDROME): ICD-10-CM

## 2022-03-23 DIAGNOSIS — I10 ESSENTIAL HYPERTENSION: ICD-10-CM

## 2022-03-23 DIAGNOSIS — I63.9 CEREBROVASCULAR ACCIDENT (CVA), UNSPECIFIED MECHANISM (HCC): ICD-10-CM

## 2022-03-23 LAB
ABSOLUTE EOS #: 0.7 K/UL (ref 0–0.4)
ABSOLUTE LYMPH #: 1.8 K/UL (ref 1–4.8)
ABSOLUTE MONO #: 0.5 K/UL (ref 0.1–1.3)
ALBUMIN SERPL-MCNC: 4.3 G/DL (ref 3.5–5.2)
ALP BLD-CCNC: 48 U/L (ref 35–104)
ALT SERPL-CCNC: 13 U/L (ref 5–33)
ANION GAP SERPL CALCULATED.3IONS-SCNC: 11 MMOL/L (ref 9–17)
AST SERPL-CCNC: 16 U/L
BASOPHILS # BLD: 1 % (ref 0–2)
BASOPHILS ABSOLUTE: 0.1 K/UL (ref 0–0.2)
BILIRUB SERPL-MCNC: <0.15 MG/DL (ref 0.3–1.2)
BUN BLDV-MCNC: 51 MG/DL (ref 8–23)
CALCIUM SERPL-MCNC: 9.2 MG/DL (ref 8.6–10.4)
CHLORIDE BLD-SCNC: 105 MMOL/L (ref 98–107)
CO2: 27 MMOL/L (ref 20–31)
CREAT SERPL-MCNC: 1.22 MG/DL (ref 0.5–0.9)
EOSINOPHILS RELATIVE PERCENT: 11 % (ref 0–4)
GFR AFRICAN AMERICAN: 53 ML/MIN
GFR NON-AFRICAN AMERICAN: 43 ML/MIN
GFR SERPL CREATININE-BSD FRML MDRD: ABNORMAL ML/MIN/{1.73_M2}
GLUCOSE BLD-MCNC: 152 MG/DL (ref 70–99)
HCT VFR BLD CALC: 36.1 % (ref 36–46)
HEMOGLOBIN: 12 G/DL (ref 12–16)
LYMPHOCYTES # BLD: 27 % (ref 24–44)
MCH RBC QN AUTO: 31.1 PG (ref 26–34)
MCHC RBC AUTO-ENTMCNC: 33.1 G/DL (ref 31–37)
MCV RBC AUTO: 93.8 FL (ref 80–100)
MONOCYTES # BLD: 7 % (ref 1–7)
PDW BLD-RTO: 14 % (ref 11.5–14.9)
PLATELET # BLD: 359 K/UL (ref 150–450)
PMV BLD AUTO: 7.2 FL (ref 6–12)
POTASSIUM SERPL-SCNC: 4.5 MMOL/L (ref 3.7–5.3)
RBC # BLD: 3.85 M/UL (ref 4–5.2)
SEG NEUTROPHILS: 54 % (ref 36–66)
SEGMENTED NEUTROPHILS ABSOLUTE COUNT: 3.7 K/UL (ref 1.3–9.1)
SODIUM BLD-SCNC: 143 MMOL/L (ref 135–144)
TOTAL PROTEIN: 7.2 G/DL (ref 6.4–8.3)
WBC # BLD: 6.8 K/UL (ref 3.5–11)

## 2022-03-23 PROCEDURE — 2580000003 HC RX 258: Performed by: NURSE PRACTITIONER

## 2022-03-23 PROCEDURE — G0378 HOSPITAL OBSERVATION PER HR: HCPCS

## 2022-03-23 PROCEDURE — 6370000000 HC RX 637 (ALT 250 FOR IP): Performed by: INTERNAL MEDICINE

## 2022-03-23 PROCEDURE — 6370000000 HC RX 637 (ALT 250 FOR IP): Performed by: PSYCHIATRY & NEUROLOGY

## 2022-03-23 PROCEDURE — 85025 COMPLETE CBC W/AUTO DIFF WBC: CPT

## 2022-03-23 PROCEDURE — 71045 X-RAY EXAM CHEST 1 VIEW: CPT

## 2022-03-23 PROCEDURE — 99217 PR OBSERVATION CARE DISCHARGE MANAGEMENT: CPT | Performed by: INTERNAL MEDICINE

## 2022-03-23 PROCEDURE — 80053 COMPREHEN METABOLIC PANEL: CPT

## 2022-03-23 PROCEDURE — 6370000000 HC RX 637 (ALT 250 FOR IP): Performed by: NURSE PRACTITIONER

## 2022-03-23 PROCEDURE — 6370000000 HC RX 637 (ALT 250 FOR IP): Performed by: PHYSICAL MEDICINE & REHABILITATION

## 2022-03-23 PROCEDURE — 99232 SBSQ HOSP IP/OBS MODERATE 35: CPT | Performed by: PHYSICAL MEDICINE & REHABILITATION

## 2022-03-23 PROCEDURE — 36415 COLL VENOUS BLD VENIPUNCTURE: CPT

## 2022-03-23 PROCEDURE — 1180000000 HC REHAB R&B

## 2022-03-23 RX ORDER — AMLODIPINE BESYLATE 10 MG/1
10 TABLET ORAL DAILY
Status: DISCONTINUED | OUTPATIENT
Start: 2022-03-24 | End: 2022-03-27

## 2022-03-23 RX ORDER — ATORVASTATIN CALCIUM 40 MG/1
40 TABLET, FILM COATED ORAL NIGHTLY
Status: DISCONTINUED | OUTPATIENT
Start: 2022-03-23 | End: 2022-04-12 | Stop reason: HOSPADM

## 2022-03-23 RX ORDER — GABAPENTIN 100 MG/1
100 CAPSULE ORAL 2 TIMES DAILY
Status: CANCELLED | OUTPATIENT
Start: 2022-03-23

## 2022-03-23 RX ORDER — CARVEDILOL 12.5 MG/1
12.5 TABLET ORAL 2 TIMES DAILY
Status: CANCELLED | OUTPATIENT
Start: 2022-03-23

## 2022-03-23 RX ORDER — LANOLIN ALCOHOL/MO/W.PET/CERES
1000 CREAM (GRAM) TOPICAL DAILY
Status: DISCONTINUED | OUTPATIENT
Start: 2022-03-24 | End: 2022-04-12 | Stop reason: HOSPADM

## 2022-03-23 RX ORDER — DOCUSATE SODIUM 100 MG/1
100 CAPSULE, LIQUID FILLED ORAL 2 TIMES DAILY PRN
Status: DISCONTINUED | OUTPATIENT
Start: 2022-03-23 | End: 2022-04-12 | Stop reason: HOSPADM

## 2022-03-23 RX ORDER — FENOFIBRATE 160 MG/1
160 TABLET ORAL DAILY
Status: CANCELLED | OUTPATIENT
Start: 2022-03-24

## 2022-03-23 RX ORDER — PRAMIPEXOLE DIHYDROCHLORIDE 0.25 MG/1
0.5 TABLET ORAL NIGHTLY
Status: CANCELLED | OUTPATIENT
Start: 2022-03-23

## 2022-03-23 RX ORDER — PRAMIPEXOLE DIHYDROCHLORIDE 0.25 MG/1
0.5 TABLET ORAL NIGHTLY
Status: DISCONTINUED | OUTPATIENT
Start: 2022-03-23 | End: 2022-04-12 | Stop reason: HOSPADM

## 2022-03-23 RX ORDER — FUROSEMIDE 20 MG/1
20 TABLET ORAL 2 TIMES DAILY
Status: CANCELLED | OUTPATIENT
Start: 2022-03-23

## 2022-03-23 RX ORDER — FUROSEMIDE 20 MG/1
20 TABLET ORAL 2 TIMES DAILY
Status: DISCONTINUED | OUTPATIENT
Start: 2022-03-23 | End: 2022-03-23 | Stop reason: HOSPADM

## 2022-03-23 RX ORDER — CEPHALEXIN 500 MG/1
500 CAPSULE ORAL EVERY 12 HOURS SCHEDULED
Status: COMPLETED | OUTPATIENT
Start: 2022-03-23 | End: 2022-03-23

## 2022-03-23 RX ORDER — BISACODYL 10 MG
10 SUPPOSITORY, RECTAL RECTAL DAILY PRN
Status: DISCONTINUED | OUTPATIENT
Start: 2022-03-23 | End: 2022-04-12 | Stop reason: HOSPADM

## 2022-03-23 RX ORDER — POLYETHYLENE GLYCOL 3350 17 G/17G
17 POWDER, FOR SOLUTION ORAL DAILY
Status: DISCONTINUED | OUTPATIENT
Start: 2022-03-23 | End: 2022-04-12 | Stop reason: HOSPADM

## 2022-03-23 RX ORDER — FUROSEMIDE 20 MG/1
20 TABLET ORAL 2 TIMES DAILY
Status: DISCONTINUED | OUTPATIENT
Start: 2022-03-23 | End: 2022-04-12 | Stop reason: HOSPADM

## 2022-03-23 RX ORDER — ACETAMINOPHEN 325 MG/1
650 TABLET ORAL EVERY 4 HOURS PRN
Status: DISCONTINUED | OUTPATIENT
Start: 2022-03-23 | End: 2022-04-12 | Stop reason: HOSPADM

## 2022-03-23 RX ORDER — FENOFIBRATE 160 MG/1
160 TABLET ORAL DAILY
Status: DISCONTINUED | OUTPATIENT
Start: 2022-03-24 | End: 2022-04-12 | Stop reason: HOSPADM

## 2022-03-23 RX ORDER — CARVEDILOL 12.5 MG/1
12.5 TABLET ORAL 2 TIMES DAILY
Status: DISCONTINUED | OUTPATIENT
Start: 2022-03-23 | End: 2022-04-12 | Stop reason: HOSPADM

## 2022-03-23 RX ORDER — CITALOPRAM 20 MG/1
20 TABLET ORAL DAILY
Status: CANCELLED | OUTPATIENT
Start: 2022-03-23

## 2022-03-23 RX ORDER — CEPHALEXIN 500 MG/1
500 CAPSULE ORAL EVERY 12 HOURS SCHEDULED
Status: CANCELLED | OUTPATIENT
Start: 2022-03-23 | End: 2022-03-24

## 2022-03-23 RX ORDER — CITALOPRAM 20 MG/1
20 TABLET ORAL DAILY
Status: DISCONTINUED | OUTPATIENT
Start: 2022-03-24 | End: 2022-04-12 | Stop reason: HOSPADM

## 2022-03-23 RX ORDER — SENNA PLUS 8.6 MG/1
2 TABLET ORAL DAILY PRN
Status: DISCONTINUED | OUTPATIENT
Start: 2022-03-23 | End: 2022-04-12 | Stop reason: HOSPADM

## 2022-03-23 RX ORDER — DOCUSATE SODIUM 100 MG/1
100 CAPSULE, LIQUID FILLED ORAL 2 TIMES DAILY PRN
Status: CANCELLED | OUTPATIENT
Start: 2022-03-23

## 2022-03-23 RX ORDER — FERROUS SULFATE 325(65) MG
325 TABLET ORAL 2 TIMES DAILY
Status: CANCELLED | OUTPATIENT
Start: 2022-03-23

## 2022-03-23 RX ORDER — ATORVASTATIN CALCIUM 40 MG/1
40 TABLET, FILM COATED ORAL NIGHTLY
Status: CANCELLED | OUTPATIENT
Start: 2022-03-23

## 2022-03-23 RX ORDER — GABAPENTIN 100 MG/1
100 CAPSULE ORAL 2 TIMES DAILY
Status: DISCONTINUED | OUTPATIENT
Start: 2022-03-23 | End: 2022-03-25

## 2022-03-23 RX ORDER — AMLODIPINE BESYLATE 10 MG/1
10 TABLET ORAL DAILY
Status: CANCELLED | OUTPATIENT
Start: 2022-03-23

## 2022-03-23 RX ORDER — FERROUS SULFATE 325(65) MG
325 TABLET ORAL 2 TIMES DAILY
Status: DISCONTINUED | OUTPATIENT
Start: 2022-03-23 | End: 2022-04-12 | Stop reason: HOSPADM

## 2022-03-23 RX ORDER — LANOLIN ALCOHOL/MO/W.PET/CERES
1000 CREAM (GRAM) TOPICAL DAILY
Status: CANCELLED | OUTPATIENT
Start: 2022-03-23

## 2022-03-23 RX ADMIN — AMLODIPINE BESYLATE 10 MG: 5 TABLET ORAL at 09:10

## 2022-03-23 RX ADMIN — ACETAMINOPHEN 650 MG: 325 TABLET ORAL at 21:36

## 2022-03-23 RX ADMIN — FERROUS SULFATE TAB 325 MG (65 MG ELEMENTAL FE) 325 MG: 325 (65 FE) TAB at 21:13

## 2022-03-23 RX ADMIN — GABAPENTIN 100 MG: 100 CAPSULE ORAL at 21:13

## 2022-03-23 RX ADMIN — FERROUS SULFATE TAB 325 MG (65 MG ELEMENTAL FE) 325 MG: 325 (65 FE) TAB at 09:12

## 2022-03-23 RX ADMIN — METRONIDAZOLE 100 MG: 500 TABLET ORAL at 09:13

## 2022-03-23 RX ADMIN — ATORVASTATIN CALCIUM 40 MG: 40 TABLET, FILM COATED ORAL at 21:13

## 2022-03-23 RX ADMIN — LISINOPRIL 30 MG: 20 TABLET ORAL at 09:13

## 2022-03-23 RX ADMIN — CITALOPRAM HYDROBROMIDE 20 MG: 20 TABLET ORAL at 09:11

## 2022-03-23 RX ADMIN — CALCIUM CARBONATE 600 MG (1,500 MG)-VITAMIN D3 400 UNIT TABLET 1 TABLET: at 09:10

## 2022-03-23 RX ADMIN — CYANOCOBALAMIN TAB 1000 MCG 1000 MCG: 1000 TAB at 09:15

## 2022-03-23 RX ADMIN — CEPHALEXIN 500 MG: 500 CAPSULE ORAL at 09:11

## 2022-03-23 RX ADMIN — SODIUM CHLORIDE, PRESERVATIVE FREE 10 ML: 5 INJECTION INTRAVENOUS at 09:14

## 2022-03-23 RX ADMIN — ACETAMINOPHEN 650 MG: 325 TABLET, FILM COATED ORAL at 09:11

## 2022-03-23 RX ADMIN — APIXABAN 5 MG: 5 TABLET, FILM COATED ORAL at 09:10

## 2022-03-23 RX ADMIN — PRAMIPEXOLE DIHYDROCHLORIDE 0.5 MG: 0.25 TABLET ORAL at 21:13

## 2022-03-23 RX ADMIN — FUROSEMIDE 20 MG: 20 TABLET ORAL at 18:40

## 2022-03-23 RX ADMIN — FUROSEMIDE 40 MG: 40 TABLET ORAL at 09:12

## 2022-03-23 RX ADMIN — CARVEDILOL 12.5 MG: 12.5 TABLET, FILM COATED ORAL at 21:13

## 2022-03-23 RX ADMIN — CARVEDILOL 12.5 MG: 12.5 TABLET, FILM COATED ORAL at 09:11

## 2022-03-23 RX ADMIN — CEPHALEXIN 500 MG: 500 CAPSULE ORAL at 21:13

## 2022-03-23 RX ADMIN — FENOFIBRATE 160 MG: 160 TABLET ORAL at 09:12

## 2022-03-23 RX ADMIN — APIXABAN 5 MG: 5 TABLET, FILM COATED ORAL at 21:13

## 2022-03-23 ASSESSMENT — PAIN SCALES - GENERAL
PAINLEVEL_OUTOF10: 0
PAINLEVEL_OUTOF10: 8

## 2022-03-23 NOTE — PROGRESS NOTES
HOB up  @  50 degrees  resp easy  @  18/min  Pt. Has no complaints at this time  Helped pt.  To be repositioned in bed

## 2022-03-23 NOTE — FLOWSHEET NOTE
03/23/22 1455   Encounter Summary   Services provided to: Patient   Referral/Consult From: Rounding   Complexity of Encounter Low   Length of Encounter 15 minutes   Spiritual/Baptism   Type Spiritual support   Assessment Sleeping   Intervention Prayer

## 2022-03-23 NOTE — PROGRESS NOTES
Pt admitted from [address] [place] [address] to ARU room 2622 per w/c. Assisted into bed with 2 assist. VS and assessment completed. Bed in low position, call light in reach.

## 2022-03-23 NOTE — PROGRESS NOTES
Rcv'd vm from [facility] with denial for ARU d/t \"more approp for SNF\". Notified Dr Abimael Chiang who states P2P is approp at this time. Notified Ijeoma López, [address]    Scheduled P2P with Mesfin Oppenheim @ [facility] for today from now until 4:30pm , and Dr Abhinav Coelho will call Dr Abimael Chiang for P2P discussion by 4:30pm.  Notified Ijeoma Chain. Notified Ijeoma López that per Dr Marisol Jordan has overturned initial denial and pt is now approved for ARU. Writer requested d/c readmit be completed with continuation of DVT prophylaxis and report be called to [id number]. Also requested per Dr Abimael Chiang that pt's sternal pain be addressed and cleared prior to ARU. Admission Assessment completed in anticipation of pt's admit to ARU. Dr Abimael Chiang notified.

## 2022-03-23 NOTE — PROGRESS NOTES
Discussed with Dr. Chris Oconnor, [id number], reviewed PT/OT needs as well as medical needs-patient approved for acute inpatient rehabilitation.

## 2022-03-23 NOTE — PLAN OF CARE
Problem: Pain:  Goal: Pain level will decrease  Description: Pain level will decrease  3/23/2022 0243 by Jyoti More RN  Outcome: Ongoing  Note: Acute pain rib cage. Medicated as needed. Patient tolerating. Will continue to monitor      Problem: Pain:  Goal: Control of acute pain  Description: Control of acute pain  3/23/2022 0243 by Jyoti More RN  Outcome: Ongoing  Note: Acute pain rib cage. Medicated as needed. Patient tolerating. Will continue to monitor      Problem: Pain:  Goal: Control of chronic pain  Description: Control of chronic pain  Outcome: Ongoing  Note: Acute pain rib cage. Medicated as needed. Patient tolerating. Will continue to monitor      Problem: Falls - Risk of:  Goal: Will remain free from falls  Description: Will remain free from falls  3/23/2022 0243 by Jyoti More RN  Outcome: Ongoing  Note: Pt remained absent from falls. Call light within reach. Bed locked and in lowest position. Problem: Falls - Risk of:  Goal: Absence of physical injury  Description: Absence of physical injury  Outcome: Ongoing  Note: Patient remains free of injury.  safe environment maintained       Problem: Neurological  Goal: Maximum potential motor/sensory/cognitive function  Outcome: Ongoing  Note: Ongoing improvement

## 2022-03-23 NOTE — PROGRESS NOTES
Signed off to Kim mccall  With bedside reporting  IV  INT intact, no redness noted  Bed alarm on  Pt. Has no complaints at this time  Pt. Cont.  To watch TV

## 2022-03-23 NOTE — PROGRESS NOTES
Writer paged  updated him on pt as she has been complaining of mid sternum and rib pain that is interfering with her movement cause her discomfort. I asked him about another CT scan of chest that yolanda Woodward asked about. Dr. Mayela Pisano placed XR chest order in.  Will continue to monitor

## 2022-03-23 NOTE — DISCHARGE SUMMARY
[facility] Internal Medicine  Carmen Humphreys MD; Jennifer Rosario MD; Prieto Romano MD; MD Durga Moran MD; Landon Al MD      [facility]  [facility]    Discharge Summary     Patient ID: Ayana Álvarez  :  1950   MRN: [id number]     ACCOUNT:  [de-identified]   Patient's PCP: Paige Hendrix MD  Admit Date: 3/16/2022   Discharge Date: 3/23/2022     Length of Stay: 0  Code Status:  Full Code  Admitting Physician: Landon Al MD  Discharge Physician: Landon Al MD     Active Discharge Diagnoses:     Hospital Problem Lists:  Principal Problem:    Ataxia  Active Problems:    Peripheral edema    Type 2 diabetes mellitus with circulatory disorder, without long-term current use of insulin ([facility])    Frequent falls    Cervical myelopathy ([facility] [address]  Resolved Problems:    * No resolved hospital problems. *      Admission Condition:  critical     Discharged Condition: stable    Hospital Stay:     Hospital Course:  Ayana Álvarez is a 71 y.o. female who was admitted for the management of   Ataxia , presented to ER with Leg Pain, Shortness of Breath, Fall, and Neck Pain    The patient is a 71 y.o.  female, with a history of anemia, spondylolisthesis, chronic DVT, chronic diastolic heart failure, CVA, major depressive disorder, s/p cervical spine fusion, stenosis of cervical spine with myelopathy, and DM type II, who presents with leg pain, shortness of breath, fall, and neck pain. According to patient and her , patient has had multiple falls recently including a fall Monday and evening and again on Tuesday. Patient reports that today she felt extremely weak upon waking.  reports that patient is unstable on her feet with poor balance. Reports history of neck surgery and patient states she has been having pain on the left side of her neck.    reports that this is due to her refusal to sleep in the bed, stating that she sleeps in her chair every night causing poor posture and muscle stiffness upon waking. Symptoms are associated with bilateral lower leg edema and pain in right leg; patient known to have DVT and is currently taking Eliquis twice daily. Denies fever, chills, cough, abdominal pain, nausea, vomiting, diarrhea, and urinary symptoms.  reports that she hit her head during fall both on Monday and on Tuesday; however, there was no loss of consciousness. There are no aggravating or alleviating factors. Patient reports that she uses her walker with ambulation, but  feels that her fear of falling and lower leg edema make ambulation difficult. There are no alleviating factors. Symptoms are reported as constant and moderate to severe; progressively worsening. CT on arrival of the CT chest did not show any acute fractures, old left rib fractures,  Her physical therapy form   presented with multiple falls, chronic cervical spine stenosis with myelopathy,  Hx of  DVT of the right leg on Eliquis,  Trauma work-up was negative,  Multiple remote and healing rib fractures,  Neurology on board,  MRI showed some neural foraminal narrowing, PT OT worked, going to acute rehab,  Still complaining of chest pain,  Chest x-ray done today,  No acute finding,  She is stable as per chest pain standpoint which is muscular, no active fractures or cardiopulmonary compromise at this time.     Review of system:  Denies any nausea vomiting fever chills,  Denies any headaches or blurred vision,  Denies any chest pain shortness of pain orthopnea,   Denies any cough phlegm hemoptysis,  Denies any abdominal pain diarrhea constipation,  Denies any tingling tingling numbness weakness of arms or legs,   Skin no rash,    On examination,  Alert awake oriented x3,  S1-S2 present,  CTA bilateral,  Abdomen soft nontender nondistended bowel sounds present   Extremity no edema no calf tenderness,,  Skin no rash  CNS no focal neurological deficits      Significant therapeutic interventions:     Significant Diagnostic Studies:   Labs / Micro:  CBC:   Lab Results   Component Value Date    WBC 6.8 03/23/2022    RBC 3.85 03/23/2022    HGB 12.0 03/23/2022    HCT 36.1 03/23/2022    MCV 93.8 03/23/2022    MCH 31.1 03/23/2022    MCHC 33.1 03/23/2022    RDW 14.0 03/23/2022     03/23/2022     BMP:    Lab Results   Component Value Date    GLUCOSE 152 03/23/2022     03/23/2022    K 4.5 03/23/2022     03/23/2022    CO2 27 03/23/2022    ANIONGAP 11 03/23/2022    BUN 51 03/23/2022    CREATININE 1.22 03/23/2022    BUNCRER NOT REPORTED 02/16/2022    CALCIUM 9.2 03/23/2022    LABGLOM 43 03/23/2022    GFRAA 53 03/23/2022    GFR      03/23/2022     HFP:    Lab Results   Component Value Date    PROT 7.2 03/23/2022     CMP:    Lab Results   Component Value Date    GLUCOSE 152 03/23/2022     03/23/2022    K 4.5 03/23/2022     03/23/2022    CO2 27 03/23/2022    BUN 51 03/23/2022    CREATININE 1.22 03/23/2022    ANIONGAP 11 03/23/2022    ALKPHOS 48 03/23/2022    ALT 13 03/23/2022    AST 16 03/23/2022    BILITOT <0.15 03/23/2022    LABALBU 4.3 03/23/2022    ALBUMIN NOT REPORTED 12/22/2021    LABGLOM 43 03/23/2022    GFRAA 53 03/23/2022    GFR      03/23/2022    PROT 7.2 03/23/2022    CALCIUM 9.2 03/23/2022     PT/INR:    Lab Results   Component Value Date    PROTIME 13.1 04/08/2020    INR 1.0 04/08/2020     PTT:   Lab Results   Component Value Date    APTT 30.2 04/08/2020     FLP:    Lab Results   Component Value Date    CHOL 103 05/20/2019    TRIG 66 05/20/2019    HDL 74 03/12/2021     U/A:    Lab Results   Component Value Date    COLORU dark yellow 02/16/2022    COLORU Yellow 12/22/2021    TURBIDITY Clear 12/22/2021    SPECGRAV 1.030 02/16/2022    SPECGRAV 1.007 12/22/2021    HGBUR NEGATIVE 12/22/2021    PHUR 6.0 02/16/2022    PHUR 5.0 12/22/2021    PROTEINU negative 02/16/2022    PROTEINU NEGATIVE 12/22/2021    GLUCOSEU negative 02/16/2022    GLUCOSEU NEGATIVE 12/22/2021    KETUA negative 02/16/2022    KETUA NEGATIVE 12/22/2021    BILIRUBINUR 1 02/16/2022    UROBILINOGEN Normal 12/22/2021    NITRU NEGATIVE 12/22/2021    LEUKOCYTESUR negative 02/16/2022    LEUKOCYTESUR NEGATIVE 12/22/2021     TSH:    Lab Results   Component Value Date    TSH 1.43 05/20/2019          Radiology:  CT HEAD WO CONTRAST    Result Date: 3/17/2022  1. No acute intracranial abnormality. 2. Cerebral and cerebellar parenchymal volume loss with chronic microvascular white matter ischemic disease. CT CHEST W CONTRAST    Result Date: 3/17/2022  1. There are old left-sided rib fractures. There is some areas of callus along the left 8th and 9th rib fractures which could be subacute or chronic. Correlate with signs of pain in this region. 2. No other acute traumatic injury involving the chest. 3. Atherosclerotic disease. 4. Fatty liver. 5. Gallstone without adjacent inflammatory changes. 6. Right adrenal nodule, likely related to an adenoma, unchanged dating back to 2018, benign. CT CERVICAL SPINE WO CONTRAST    Result Date: 3/17/2022  No evidence of acute fracture in the cervical spine. MRI LUMBAR SPINE WO CONTRAST    Result Date: 3/18/2022  At L4-L5, moderate bilateral neural foraminal narrowing and changes related to instrumented disc space fusion and posterior fusion. Bilateral laminectomy defects grade 1-2 anterolisthesis. Mild-to-moderate bilateral neural foraminal narrowing At L5-S1,  bilateral laminectomy defects, mild bilateral neural foraminal narrowing     XR CHEST PORTABLE    Result Date: 3/23/2022  Old left rib fractures noted. Examination is otherwise unremarkable.        Consultations:    Consults:     Final Specialist Recommendations/Findings:   IP CONSULT TO SOCIAL WORK  IP CONSULT TO NEUROLOGY  IP CONSULT TO PHYSICAL MEDICINE REHAB      The patient was seen and examined on day of discharge and this discharge summary is in conjunction with any daily progress note from day of discharge. Discharge plan:     Disposition: Discharge/Readmit to ARU    Physician Follow Up:     No follow-up provider specified. Requiring Further Evaluation/Follow Up POST HOSPITALIZATION/Incidental Findings:     Diet: regular diet    Activity: As tolerated    Instructions to Patient:     Discharge Medications:      Medication List      CONTINUE taking these medications    Lancets Misc  1 each by Does not apply route daily        ASK your doctor about these medications    amLODIPine 10 MG tablet  Commonly known as: Norvasc  Take 1 tablet by mouth daily     apixaban 5 MG Tabs tablet  Commonly known as: Eliquis  Please take 2 tablets by mouth for the first week then take 1 tablet by mouth BID for acute DVT     atorvastatin 80 MG tablet  Commonly known as: LIPITOR  Take 0.5 tablets by mouth nightly     BENADRYL ALLERGY PO     blood glucose test strips  Test 2 times a day & as needed for symptoms of irregular blood glucose.      Calcium 500/D 500-125 MG-UNIT Tabs  Generic drug: Calcium Carbonate-Vitamin D     carvedilol 12.5 MG tablet  Commonly known as: COREG  Take 1 tablet by mouth 2 times daily     citalopram 20 MG tablet  Commonly known as: CELEXA  Take 1 tablet by mouth daily     cyanocobalamin 1000 MCG tablet  Commonly known as: [employer] VITAMIN B12  Take 1 tablet by mouth daily     docusate sodium 100 MG capsule  Commonly known as: Colace  Take 1 capsule by mouth 2 times daily as needed for Constipation     fenofibrate micronized 134 MG capsule  Commonly known as: LOFIBRA  Take 1 capsule by mouth every morning (before breakfast)     ferrous sulfate 325 (65 Fe) MG tablet  Commonly known as: IRON 325  Take 1 tablet by mouth 2 times daily     furosemide 40 MG tablet  Commonly known as: LASIX  Take 1 tablet by mouth 2 times daily     lisinopril 30 MG tablet  Commonly known as: PRINIVIL;ZESTRIL  Take 1 tablet by mouth daily     nitroGLYCERIN 0.4 MG SL tablet  Commonly known as: Nitrostat  Place 1 tablet under the tongue every 5 minutes as needed for Chest pain     pramipexole 0.5 MG tablet  Commonly known as: Mirapex  Take 1 tablet by mouth nightly     VITAMIN D (ERGOCALCIFEROL) PO            No discharge procedures on file. Time Spent on discharge is  35 mins in patient examination, evaluation, counseling as well as medication reconciliation, prescriptions for required medications, discharge plan and follow up. Electronically signed by   Malik Paulino MD  3/23/2022  3:27 PM      Thank you Dr. Essie Christensen MD for the opportunity to be involved in this patient's care. Please note that this chart was generated using voice recognition Dragon dictation software. Although every effort was made to ensure the accuracy of this automated transcription, some errors in transcription may have occurred.

## 2022-03-23 NOTE — PROGRESS NOTES
Physical Medicine & Rehabilitation  Progress Note    3/23/2022 9:35 AM     CC: Ambulatory and ADL dysfunction due to cervical myelopathy     Subjective: Thinks she has a rib fracture, has significant tenderness over sternal area. Patient notes difficulty holding  phone due to discomfort    ROS:  Denies fevers, chills, sweats. No chest pain, palpitations, lightheadedness. Denies coughing, wheezing or shortness of breath. Denies abdominal pain, nausea, diarrhea or constipation. No new areas of joint pain. Denies new areas of numbness or weakness. Denies new anxiety or depression issues. No new skin problems. Rehabilitation:   PT:  Restrictions/Precautions: General Precautions,Fall Risk  Implants present? : Metal implants (cervical and lumbar surgeries, L wrist ORIF)  Other position/activity restrictions: up as tolerated   Transfers  Sit to Stand: Minimal Assistance  Stand to sit: Contact guard assistance  Bed to Chair: Minimal assistance  Stand Pivot Transfers: Contact guard assistance  Comment: Pt is Min A x1 for STS from chair in room. Pt heavily relys on UE and needs cueing to postion and use LE. Pt is CGA x1 for stand to sit and stand pivots. Pt needs verbal and tactile cueing for technique and hand placements. Ambulation 1  Surface: level tile  Device: Rolling Walker  Assistance: Contact guard assistance  Quality of Gait: slow tawanda with step to pattern. Pt favors LLE and has lack of weight shifting, with downward gaze and cervical flexion. Gait Deviations: Decreased step length,Decreased step height,Shuffles,Slow Tawanda  Distance: 50ft x1  Comments: Pt is CGA x1 for safety due to compinsation of  LLE and poor endurance. Pt has decreased step length and height with intermittent shuffling gait. Pt needs increase time for amb due to slow tawanda.  Pt had good carryover with verbal cuing of up lifting gaze, and good carryover for tactile cue to shifting weight in LE.          OT:  ADL  Feeding: Setup  Grooming: Setup (demonstrates with hair brush)  UE Bathing: Stand by assistance  LE Bathing: Moderate assistance (reports assist B lower legs/feet)  UE Dressing: Stand by assistance  LE Dressing: Moderate assistance  Toileting: Moderate assistance  Additional Comments: ADL scores based on skilled observations, clinical reasoning, and patient report unless otherwise noted. Assistance to doff/don socks this date as well as assist clothing mangement during toileting. Balance  Sitting Balance: Stand by assistance  Standing Balance: Minimal assistance   Standing Balance  Time: 1-2 minutes  Activity: functional transfers, functional mobility  Comment: with RW for UE support  Functional Mobility  Functional - Mobility Device: Rolling Walker  Activity: To/from bathroom  Assist Level: Minimal assistance  Functional Mobility Comments: assistance with RW positioning     Bed mobility  Rolling to Left: Minimal assistance  Supine to Sit: Minimal assistance  Sit to Supine: Unable to assess  Scooting: Contact guard assistance  Comment: Patient seated in chair at start and end of session  Transfers  Sit to stand: Minimal assistance  Stand to sit: Minimal assistance  Transfer Comments: Minimal verbal cues for hand placement and safety; assist at L foot during tranfsers   Toilet Transfers  Toilet - Technique: Ambulating  Equipment Used: Grab bars  Toilet Transfer: Minimal assistance  Toilet Transfers Comments: with RW               ST:            Objective:  BP (!) 133/57   Pulse 67   Temp 97.9 °F (36.6 °C) (Oral)   Resp 18   Ht 5' 3\" (1.6 m)   Wt 179 lb 0.2 oz (81.2 kg)   SpO2 92%   BMI 31.71 kg/m²  I Body mass index is 31.71 kg/m².  I   Wt Readings from Last 1 Encounters:   22 179 lb 0.2 oz (81.2 kg)      Temp (24hrs), Av.2 °F (36.8 °C), Min:97.9 °F (36.6 °C), Max:98.4 °F (36.9 °C)         GEN: well developed, well nourished, no acute distress  HEENT: Normocephalic atraumatic, EOMI, mucous membranes pink and moist  CV: RRR, no murmurs, rubs or gallops  PULM: CTAB, no rales or rhonchi. Respirations WNL and unlabored  ABD: soft, NT, ND, +BS and equal  NEURO: A&O x3. Sensation intact to light touch. MSK: 4/5 upper lower extremities, however tenderness to minimal palpation of sternum  EXTREMITIES: No calf tenderness to palpation bilaterally. No edema BLEs  SKIN: warm dry and intact with good turgor  PSYCH: appropriately interactive. Affect WNL. Medications   Scheduled Meds:   furosemide  40 mg Oral BID    sodium chloride flush  5-40 mL IntraVENous 2 times per day    amLODIPine  10 mg Oral Daily    apixaban  5 mg Oral BID    atorvastatin  40 mg Oral Nightly    carvedilol  12.5 mg Oral BID    citalopram  20 mg Oral Daily    cyanocobalamin  1,000 mcg Oral Daily    fenofibrate  160 mg Oral Daily    ferrous sulfate  325 mg Oral BID    lisinopril  30 mg Oral Daily    pramipexole  0.5 mg Oral Nightly    calcium carbonate w/vitamin D  1 tablet Oral Daily    cephALEXin  500 mg Oral 2 times per day    gabapentin  100 mg Oral BID     Continuous Infusions:   sodium chloride       PRN Meds:.potassium chloride **OR** potassium alternative oral replacement **OR** potassium chloride, sodium chloride flush, sodium chloride, ondansetron **OR** ondansetron, magnesium hydroxide, acetaminophen **OR** acetaminophen, docusate sodium, sodium chloride flush     Diagnostics:     CBC:   Recent Labs     03/23/22  0538   WBC 6.8   RBC 3.85*   HGB 12.0   HCT 36.1   MCV 93.8   RDW 14.0        BMP:   Recent Labs     03/23/22  0538      K 4.5      CO2 27   BUN 51*   CREATININE 1.22*     BNP: No results for input(s): BNP in the last 72 hours. PT/INR: No results for input(s): PROTIME, INR in the last 72 hours. APTT: No results for input(s): APTT in the last 72 hours. CARDIAC ENZYMES: No results for input(s): CKMB, CKMBINDEX, TROPONINT in the last 72 hours.     Invalid input(s): CKTOTAL;3  FASTING LIPID PANEL:  Lab Results   Component Value Date    CHOL 103 05/20/2019    HDL 74 03/12/2021    TRIG 66 05/20/2019     LIVER PROFILE:   Recent Labs     03/23/22  0538   AST 16   ALT 13   BILITOT <0.15*   ALKPHOS 48        I/O (24Hr): Intake/Output Summary (Last 24 hours) at 3/23/2022 0935  Last data filed at 3/23/2022 0914  Gross per 24 hour   Intake 485 ml   Output 1975 ml   Net -1490 ml       Glu last 24 hour  No results for input(s): POCGLU in the last 72 hours. No results for input(s): CLARITYU, COLORU, PHUR, SPECGRAV, PROTEINU, RBCUA, BLOODU, BACTERIA, NITRU, WBCUA, LEUKOCYTESUR, YEAST, GLUCOSEU, BILIRUBINUR in the last 72 hours. CT chest 3/16   Impression:       1. There are old left-sided rib fractures. [place] is some areas of callus   along the left 8th and 9th rib fractures which could be subacute or chronic. Correlate with signs of pain in this region. 2. No other acute traumatic injury involving the chest.   3. Atherosclerotic disease. 4. Fatty liver. 5. Gallstone without adjacent inflammatory changes. 6. Right adrenal nodule, likely related to an adenoma, unchanged dating back          Impression: Ms. Socrates Zuleta is a 71 y.o. female with a history of Ataxia     1. Cervical myelopathy with history of decompression residual left-sided weakness  2. Spondylolisthesis  3. Chronic DVT-Eliquis  4. Chronic diastolic heart failure/hypertension/coronary disease/hyperlipidemia-Norvasc, Lipitor, Coreg, fenofibrate, Lasix IV, lisinopril  5. Questional history of CVA  6. Major depressive disorder-Celexa  7. History of cervical spine fusion 5/2021, lumbar fusion 2/20/2020 and 6/20/2020  8. Anemia-iron, B12  9. Lower extremity cellulitis-Keflex  10. Pain-Neurontin  11. Restless leg-Mirapex  12. Sternal pain-CT above-old left-sided rib fractures, questionable subacute or chronic left eighth and ninth rib fractures-reviewed with nursing -will notify primary  13.  Acute kidney injury-BUN/creatinine increased 51/1.22 from 26/0.85-patient on Lasix and lisinopril     Recommendations:  1. Diagnosis: Cervical myelopathy  2. Therapy: Has PT and OT needs  3. Medical  Necessity: As above  4. Support: Clarify,  is healthy however had CABG a year ago. Daughter assist with bathing and laundry at times  5. Rehab recommendation: Would benefit from acute inpatient rehabilitation when medically ready  6. DVT proph: Eliquis     It was my pleasure to evaluate [facility] today. Please call with questions.     Marcus Haro MD       This note is created with the assistance of a speech recognition program.  While intending to generate a document that actually reflects the content of the visit, the document can still have some errors including those of syntax and sound a like substitutions which may escape proof reading.   In such instances, actual meaning can be extrapolated by contextual diversion

## 2022-03-24 LAB
ABSOLUTE EOS #: 0.5 K/UL (ref 0–0.4)
ABSOLUTE LYMPH #: 1.7 K/UL (ref 1–4.8)
ABSOLUTE MONO #: 0.4 K/UL (ref 0.1–1.3)
ANION GAP SERPL CALCULATED.3IONS-SCNC: 12 MMOL/L (ref 9–17)
BASOPHILS # BLD: 1 % (ref 0–2)
BASOPHILS ABSOLUTE: 0 K/UL (ref 0–0.2)
BUN BLDV-MCNC: 55 MG/DL (ref 8–23)
CALCIUM SERPL-MCNC: 9.4 MG/DL (ref 8.6–10.4)
CHLORIDE BLD-SCNC: 104 MMOL/L (ref 98–107)
CO2: 27 MMOL/L (ref 20–31)
CREAT SERPL-MCNC: 1.21 MG/DL (ref 0.5–0.9)
EOSINOPHILS RELATIVE PERCENT: 8 % (ref 0–4)
GFR AFRICAN AMERICAN: 53 ML/MIN
GFR NON-AFRICAN AMERICAN: 44 ML/MIN
GFR SERPL CREATININE-BSD FRML MDRD: ABNORMAL ML/MIN/{1.73_M2}
GLUCOSE BLD-MCNC: 117 MG/DL (ref 70–99)
HCT VFR BLD CALC: 34.2 % (ref 36–46)
HEMOGLOBIN: 11.4 G/DL (ref 12–16)
LYMPHOCYTES # BLD: 26 % (ref 24–44)
MCH RBC QN AUTO: 31.4 PG (ref 26–34)
MCHC RBC AUTO-ENTMCNC: 33.4 G/DL (ref 31–37)
MCV RBC AUTO: 93.9 FL (ref 80–100)
MONOCYTES # BLD: 6 % (ref 1–7)
PDW BLD-RTO: 14.1 % (ref 11.5–14.9)
PLATELET # BLD: 347 K/UL (ref 150–450)
PMV BLD AUTO: 7.2 FL (ref 6–12)
POTASSIUM SERPL-SCNC: 4.6 MMOL/L (ref 3.7–5.3)
RBC # BLD: 3.65 M/UL (ref 4–5.2)
SEG NEUTROPHILS: 59 % (ref 36–66)
SEGMENTED NEUTROPHILS ABSOLUTE COUNT: 3.9 K/UL (ref 1.3–9.1)
SODIUM BLD-SCNC: 143 MMOL/L (ref 135–144)
WBC # BLD: 6.5 K/UL (ref 3.5–11)

## 2022-03-24 PROCEDURE — 99223 1ST HOSP IP/OBS HIGH 75: CPT | Performed by: INTERNAL MEDICINE

## 2022-03-24 PROCEDURE — 36415 COLL VENOUS BLD VENIPUNCTURE: CPT

## 2022-03-24 PROCEDURE — 97535 SELF CARE MNGMENT TRAINING: CPT

## 2022-03-24 PROCEDURE — 6370000000 HC RX 637 (ALT 250 FOR IP): Performed by: PHYSICAL MEDICINE & REHABILITATION

## 2022-03-24 PROCEDURE — 97530 THERAPEUTIC ACTIVITIES: CPT

## 2022-03-24 PROCEDURE — 6370000000 HC RX 637 (ALT 250 FOR IP): Performed by: INTERNAL MEDICINE

## 2022-03-24 PROCEDURE — 1180000000 HC REHAB R&B

## 2022-03-24 PROCEDURE — 99223 1ST HOSP IP/OBS HIGH 75: CPT | Performed by: PHYSICAL MEDICINE & REHABILITATION

## 2022-03-24 PROCEDURE — 85025 COMPLETE CBC W/AUTO DIFF WBC: CPT

## 2022-03-24 PROCEDURE — 97167 OT EVAL HIGH COMPLEX 60 MIN: CPT

## 2022-03-24 PROCEDURE — 97116 GAIT TRAINING THERAPY: CPT

## 2022-03-24 PROCEDURE — 97163 PT EVAL HIGH COMPLEX 45 MIN: CPT

## 2022-03-24 PROCEDURE — 80048 BASIC METABOLIC PNL TOTAL CA: CPT

## 2022-03-24 PROCEDURE — 6370000000 HC RX 637 (ALT 250 FOR IP): Performed by: NURSE PRACTITIONER

## 2022-03-24 PROCEDURE — 97110 THERAPEUTIC EXERCISES: CPT

## 2022-03-24 RX ORDER — LANOLIN ALCOHOL/MO/W.PET/CERES
6 CREAM (GRAM) TOPICAL NIGHTLY PRN
Status: DISCONTINUED | OUTPATIENT
Start: 2022-03-24 | End: 2022-04-12 | Stop reason: HOSPADM

## 2022-03-24 RX ORDER — BUSPIRONE HYDROCHLORIDE 5 MG/1
5 TABLET ORAL 3 TIMES DAILY
Status: DISCONTINUED | OUTPATIENT
Start: 2022-03-24 | End: 2022-04-12 | Stop reason: HOSPADM

## 2022-03-24 RX ORDER — LIDOCAINE 4 G/G
1 PATCH TOPICAL DAILY
Status: DISCONTINUED | OUTPATIENT
Start: 2022-03-24 | End: 2022-04-12 | Stop reason: HOSPADM

## 2022-03-24 RX ORDER — LANOLIN ALCOHOL/MO/W.PET/CERES
6 CREAM (GRAM) TOPICAL NIGHTLY PRN
Status: DISCONTINUED | OUTPATIENT
Start: 2022-03-24 | End: 2022-03-24 | Stop reason: SDUPTHER

## 2022-03-24 RX ADMIN — LISINOPRIL 30 MG: 20 TABLET ORAL at 08:01

## 2022-03-24 RX ADMIN — POLYETHYLENE GLYCOL 3350 17 G: 17 POWDER, FOR SOLUTION ORAL at 08:02

## 2022-03-24 RX ADMIN — BUSPIRONE HYDROCHLORIDE 5 MG: 5 TABLET ORAL at 21:11

## 2022-03-24 RX ADMIN — APIXABAN 5 MG: 5 TABLET, FILM COATED ORAL at 08:01

## 2022-03-24 RX ADMIN — APIXABAN 5 MG: 5 TABLET, FILM COATED ORAL at 21:10

## 2022-03-24 RX ADMIN — FERROUS SULFATE TAB 325 MG (65 MG ELEMENTAL FE) 325 MG: 325 (65 FE) TAB at 08:00

## 2022-03-24 RX ADMIN — FENOFIBRATE 160 MG: 160 TABLET ORAL at 08:01

## 2022-03-24 RX ADMIN — ACETAMINOPHEN 650 MG: 325 TABLET ORAL at 11:51

## 2022-03-24 RX ADMIN — Medication 6 MG: at 21:10

## 2022-03-24 RX ADMIN — ATORVASTATIN CALCIUM 40 MG: 40 TABLET, FILM COATED ORAL at 21:10

## 2022-03-24 RX ADMIN — CARVEDILOL 12.5 MG: 12.5 TABLET, FILM COATED ORAL at 21:10

## 2022-03-24 RX ADMIN — FUROSEMIDE 20 MG: 20 TABLET ORAL at 08:01

## 2022-03-24 RX ADMIN — GABAPENTIN 100 MG: 100 CAPSULE ORAL at 08:01

## 2022-03-24 RX ADMIN — FUROSEMIDE 20 MG: 20 TABLET ORAL at 16:19

## 2022-03-24 RX ADMIN — ACETAMINOPHEN 650 MG: 325 TABLET ORAL at 08:01

## 2022-03-24 RX ADMIN — CARVEDILOL 12.5 MG: 12.5 TABLET, FILM COATED ORAL at 08:01

## 2022-03-24 RX ADMIN — CALCIUM CARBONATE 600 MG (1,500 MG)-VITAMIN D3 400 UNIT TABLET 1 TABLET: at 08:01

## 2022-03-24 RX ADMIN — ACETAMINOPHEN 650 MG: 325 TABLET ORAL at 21:10

## 2022-03-24 RX ADMIN — PRAMIPEXOLE DIHYDROCHLORIDE 0.5 MG: 0.25 TABLET ORAL at 21:10

## 2022-03-24 RX ADMIN — FERROUS SULFATE TAB 325 MG (65 MG ELEMENTAL FE) 325 MG: 325 (65 FE) TAB at 21:11

## 2022-03-24 RX ADMIN — CYANOCOBALAMIN TAB 1000 MCG 1000 MCG: 1000 TAB at 08:01

## 2022-03-24 RX ADMIN — CITALOPRAM HYDROBROMIDE 20 MG: 20 TABLET ORAL at 08:01

## 2022-03-24 RX ADMIN — Medication 6 MG: at 00:56

## 2022-03-24 RX ADMIN — GABAPENTIN 100 MG: 100 CAPSULE ORAL at 21:10

## 2022-03-24 RX ADMIN — AMLODIPINE BESYLATE 10 MG: 10 TABLET ORAL at 08:01

## 2022-03-24 ASSESSMENT — PAIN DESCRIPTION - PAIN TYPE
TYPE: ACUTE PAIN

## 2022-03-24 ASSESSMENT — 9 HOLE PEG TEST
TESTTIME_SECONDS: 33
TESTTIME_SECONDS: 60
TEST_RESULT: IMPAIRED
TEST_RESULT: IMPAIRED

## 2022-03-24 ASSESSMENT — PAIN DESCRIPTION - LOCATION
LOCATION: CHEST
LOCATION: CHEST;RIB CAGE
LOCATION: CHEST

## 2022-03-24 ASSESSMENT — PAIN DESCRIPTION - ORIENTATION
ORIENTATION: MID
ORIENTATION: RIGHT
ORIENTATION: RIGHT

## 2022-03-24 ASSESSMENT — PAIN SCALES - GENERAL
PAINLEVEL_OUTOF10: 7
PAINLEVEL_OUTOF10: 7
PAINLEVEL_OUTOF10: 8
PAINLEVEL_OUTOF10: 6
PAINLEVEL_OUTOF10: 8
PAINLEVEL_OUTOF10: 5

## 2022-03-24 ASSESSMENT — PAIN SCALES - WONG BAKER: WONGBAKER_NUMERICALRESPONSE: 6

## 2022-03-24 ASSESSMENT — PAIN DESCRIPTION - FREQUENCY
FREQUENCY: INTERMITTENT
FREQUENCY: INTERMITTENT

## 2022-03-24 ASSESSMENT — PAIN DESCRIPTION - ONSET: ONSET: ON-GOING

## 2022-03-24 ASSESSMENT — PAIN DESCRIPTION - PROGRESSION: CLINICAL_PROGRESSION: NOT CHANGED

## 2022-03-24 ASSESSMENT — PAIN DESCRIPTION - DESCRIPTORS
DESCRIPTORS: SHARP
DESCRIPTORS: SHARP

## 2022-03-24 NOTE — PROGRESS NOTES
Physical Therapy    Facility/Department: [facility] ACUTE REHAB  Initial Assessment    NAME: Raphael Hernandez  : 1950  MRN: [id number]    Date of Service: 3/24/2022    Discharge Recommendations:  Patient would benefit from continued therapy after discharge   PT Equipment Recommendations  Other: Pt has lift chair, rolling walker, and a Rollator at home. Assessment   Body structures, Functions, Activity limitations: Decreased functional mobility ; Decreased ADL status; Decreased ROM; Decreased strength;Decreased endurance;Decreased balance;Decreased posture; Increased pain  Assessment: Pt presents with frequent falls at home, has generalized weakness, and debility, but L LE weaker thant R LE. Pt with decreased balance, decreased motor planning and weakness contributing to her falls. Pt with recent R LE DVT and R LE isswollen at this time. Pt is high fall risk at this time   Treatment Diagnosis: Impaired functional mobility 2* ataxia  Specific instructions for Next Treatment: transfers, amb, balance, standing program  Prognosis: Good  Decision Making: High Complexity  Exam: ROM, MMT, bed mobility, transfers, amb  Clinical Presentation: Pt alert, cooperative, pleasant  Barriers to Learning: none  REQUIRES PT FOLLOW UP: Yes  Activity Tolerance  Activity Tolerance: Patient limited by pain; Patient limited by fatigue;Patient limited by endurance       Patient Diagnosis(es): There were no encounter diagnoses.      has a past medical history of Allergic rhinitis, cause unspecified, Back pain, Bowel obstruction (HCC), C. difficile diarrhea, CAD (coronary artery disease), Cardiac murmur, Cellulitis, Cellulitis, Cerebral artery occlusion with cerebral infarction ([facility] [address] COVID-19, Diverticulosis of colon (without mention of hemorrhage), GERD (gastroesophageal reflux disease), GERD (gastroesophageal reflux disease), History of blood transfusion, History of CHF (congestive heart failure), History of MI (myocardial infarction), History of ovarian cyst, History of peritonitis, HTN (hypertension), Hx of blood clots, Hyperlipidemia, Intestinal or peritoneal adhesions with obstruction (postoperative) (postinfection) ([address] Kidney infection, Lateral epicondylitis  of elbow, MDRO (multiple drug resistant organisms) resistance, Muscle strain, Other abnormal glucose, PONV (postoperative nausea and vomiting), Pre-diabetes, Restless legs syndrome (RLS), Snores, 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[address] TIA (transient ischemic attack), Uses walker, Vitamin D deficiency, Wears glasses, and Wellness examination. has a past surgical history that includes Ovary removal (1970); colectomy (0880); Appendectomy (1968); Ovary removal (1971); Hysterectomy (1973); Bunionectomy (Left); sinus surgery (2004); Colonoscopy; other surgical history (08/14/2014); cyst removal (Right); Wrist fracture surgery (Left, 03/05/2019); Upper gastrointestinal endoscopy (N/A, 05/31/2019); Upper gastrointestinal endoscopy (N/A, 08/05/2019); Upper gastrointestinal endoscopy (N/A, 08/23/2019); Abdomen surgery (1976); lumbar fusion (N/A, 02/10/2020); Cardiac catheterization; Cardiac catheterization (2005); [place] tooth extraction; lumbar fusion (N/A, 06/17/2020); back surgery; cervical fusion (05/21/2021); and cervical fusion (N/A, 5/21/2021). Restrictions  Restrictions/Precautions  Restrictions/Precautions: General Precautions,Fall Risk  Required Braces or Orthoses?: No  Implants present? : Metal implants (cervical and lumbar surgeries, L wrist ORIF)  Position Activity Restriction  Other position/activity restrictions: up as tolerated  Vision/Hearing  Vision: Impaired  Vision Exceptions: Wears glasses for reading  Hearing: Within functional limits     Subjective  General  Chart Reviewed: Yes  Patient assessed for rehabilitation services?: Yes  Family / Caregiver Present: No  Referring Practitioner: Dr Percy Enrique.   Referral Date : 03/23/22  Follows Commands: Within Functional Limits  Other (Comment): Pt is L handed  Pain Screening  Patient Currently in Pain: Yes  Pain Assessment  Pain Level: 7 (With mobility)  Pain Type: Acute pain  Pain Location: Chest;Rib cage  Pain Orientation: Right  Pain Descriptors: Sharp  Pain Frequency: Intermittent  Pain Onset: On-going  Clinical Progression: Not changed  Response to Pain Intervention: Patient Satisfied  Vital Signs  BP Location: Left upper arm  Level of Consciousness: Alert (0)  Patient Currently in Pain: Yes  Oxygen Therapy  O2 Device: None (Room air)  Patient Observation  Observations: L sided weakness, scabbing/erythema on BLE (Distally)       Orientation  Orientation  Overall Orientation Status: Within Normal Limits  Social/Functional History  Social/Functional History  Lives With: Spouse  Type of Home: House ([place])  Home Layout: One level,Laundry in basement (Pt does not go down to basement, daughter does laundry)  Home Access: Stairs to enter without rails  Entrance Stairs - Number of Steps: 1  Bathroom Shower/Tub: Tub/Shower unit,Curtain,Shower chair with back  Bathroom Toilet: Standard  Bathroom Equipment: Shower chair,Grab bars in shower,Hand-held shower,Toilet raiser,Grab bars around toilet  Bathroom Accessibility: Walker accessible  Home Equipment: Hospital bed,Rolling walker,4 wheeled walker,Cane,Lift chair,Grab bars,Reacher,Sock aid (pt states she needs new sock aid)  Receives Help From: Family  ADL Assistance: Needs assistance (daughter A with shower t/f 2x/week, IND dressing/toileting)  Homemaking Assistance: Needs assistance (daughter does laundry, pt helps with meals)  Homemaking Responsibilities: Yes ( grocery shops)  Ambulation Assistance: Independent (uses RW)  Transfer Assistance: Needs assistance ( lift chair, A with stairs/transfers to shower)  Active : No  Patient's  Info: family  Occupation: Retired  Type of occupation: hairdresser  IADL Comments: sleeps in lift chair  Additional Comments:  is retired and assists prn - hx of CABG. Daughter works full time - assists with pt 2x/week on medications, laundry, etc. Lives close by. Cognition        Objective          AROM RLE (degrees)  RLE AROM: WFL  AROM LLE (degrees)  LLE AROM : WFL  AROM RUE (degrees)  RUE General AROM: See OT  AROM LUE (degrees)  LUE General AROM: See OT  Strength RLE  Strength RLE: WFL  Comment: Grossly 4-/5  Strength LLE  Strength LLE: WFL  Comment: Grossly 3+ to 4-/5  Strength RUE  Comment: See OT  Strength LUE  Comment: See OT  Coordination  Movements Are Fluid And Coordinated: No  Coordination and Movement description:  rapid alternating movements- diffficulty with L LE  Sensation  Overall Sensation Status: Impaired (numbess in R thigh- Burning/niumbness)  Bed mobility  Rolling to Left: Minimal assistance  Rolling to Right: Minimal assistance  Supine to Sit: Minimal assistance  Sit to Supine: Maximum assistance  Scooting: Moderate assistance  Comment: Bed mobility difficulty due to weakness and pain/soresness at R chest/rib cage from falls at home. Transfers  Sit to Stand: Moderate Assistance (Difficulty pushing off with her UE during sit<>stand)  Stand to sit: Minimal Assistance (Difficulty flexing L LE during stand>sit, hold L LE off the )  Bed to Chair: Minimal assistance; Moderate assistance (fluctuates min/mod A)  Comment: mod A from bed, min A from chair, RW used for transfers. Ambulation  Ambulation?: Yes  Ambulation 1  Surface: level tile  Device: Rolling Walker  Assistance: Minimal assistance  Quality of Gait: slow tawanda with step to pattern, flat foot LE at contact, decreased stance on L LE. Pt favors LLE and has lack of weight shifting, with downward gaze and cervical flexion. Gait Deviations: Decreased step length;Decreased step height;Shuffles; Slow Tawanda  Distance: 85 ft total. ( 63 ft 2MWT)   Comments: Pt is min A for safety due to compensation of  LLE and poor endurance.  Pt has decreased step length and height with intermittent shuffling gait. Pt needs increase time for amb due to slow tawanda. Pt with decreased to no Left knee flexion in stance phase. Left knee hyperextends slightly. Stairs/Curb  Stairs?: No     Balance  Posture: Fair  Sitting - Static: Good  Sitting - Dynamic: Fair  Standing - Static: Fair (at [employer])  Standing - Dynamic: Fair;- (at [employer])  Comments: Standing with RW. Other exercises  Other exercises 1: Standing with RW, 5 reps, forward kicks, 5 reps marching , each leg  Other exercises 2: Seated L heel slides 10reps, B LE LAQ's, ankle pumpd and marching.-10 reps     Plan   Plan  Times per week: 1.5 hr/day, 5 to 7 days/week. Specific instructions for Next Treatment: transfers, amb, balance, standing program  Current Treatment Recommendations: Strengthening,Balance Training,Functional Mobility Training,Transfer Training,Endurance Training,Gait Training,Equipment Evaluation, Education, & procurement,Patient/Caregiver Education & Training,Safety Education & Training,Stair training,Wheelchair Mobility Training  Safety Devices  Type of devices: All fall risk precautions in place,Call light within reach,Gait belt,Left in chair,Chair alarm in place    G-Code       OutComes Score  Tinetti Performance Oriented Mobility Assessment  Tinetti  Sitting Balance: Steady, safe  Arises: Unable without help  Attempts to Arise: Unable without help  Immediate Standing Balance (First 5 Seconds):  Unsteady (swaggers, moves feet, trunk sway)  Standing Balance: Unsteady  Nudged: Begins to fall  Eyes Closed: Unsteady  Turned 360 Degrees: Steadiness: Unsteady (grabs, staggers)  Turned 360 Degrees: Continuity of Steps: Discontinuous steps  Sitting Down: Unsafe (misjudges distance, falls into chair)  Balance Score: 1  Initiation of Gait: Any hesitancy or multiple attempts to start  Step Height: R Swing Foot: Right foot complete clears floor  Step Length: R Swing Foot: Does not pass left stance foot with step  Step Height: L Swing Foot: Left foot complete clears floor  Step Length: L Swing Foot: Passes right stance foot  Step Symmetry: Right and left step length not equal (estimate)  Step Continuity: Stopping or discontinuity between steps  Path: Marked deviation  Trunk: Marked sway or uses walking aid  Walking Time: Heels apart  Gait Score: 3  Tinetti Total Score: 4  Tinetti Disability Index: 80-99%  Tinetti CMS MODIFIER: CM    Balance Score: 1 (03/24/22 1115)  Gait Score: 3 (03/24/22 1115)        Tinetti Total Score: 4 (03/24/22 1115)               2MWT: 63 ft with Rolling walker      Goals  Short term goals  Time Frame for Short term goals: 1 week  Short term goal 1: Pt to demostrate bed mobility CGA/Jennifer. Short term goal 2: Pt to perform transfers CGA. Short term goal 3: Pt to amb 100'-150' with device, CGA. Short term goal 4: Pt to improve BLE strength by 1/2 MMG. Short term goal 5: Pt to improve standing balance to [facility]. Short term goal 6: Pt able to perform 4\" and 6\" steps x3 in //bars or acute stair way, with 2 B UE support, min A  Long term goals  Time Frame for Long term goals : By DC  Long term goal 1: Pt able to perform bed mobility at A  Long term goal 2: Pt able to transfer at supervsion level. Long term goal 3:  Pt able to ambulate house hold distances with assistive device mod-I  Long term goal 4:  Pt able to ambulate distance of 150 ft, level surfaces and shorter distance outside terraine at [facility]. Long term goal 5: Pt able to propel w/c on level surface, distance of 150 ft, supervsion level. Long term goal 6: Improve 2MWT distance to atleast 120 ft to improve overall fucntion. Long term goal 7: Pt to improve Tinetti balance score to atlest 20/28 to reduce fall risk. Long term goal 8: Pt donny to perform cub step with B UE support and/or donny to goup and down 4 to 5 steps with 2 rails at min A   Patient Goals   Patient goals :  To get stronger       Therapy Time        03/24/22 0928 03/24/22 1110   PT Individual Minutes   Time In [id number]   Time Out [id number]   Minutes 27 35   Time Code Minutes   Timed Code Treatment Minutes 7 Minutes 35 Minutes     Shayla Moore, PT

## 2022-03-24 NOTE — PLAN OF CARE
Nutrition Problem #1: Overweight/Obese  Intervention: Food and/or Nutrient Delivery: Continue Current Diet  Nutritional Goals: Stablization of blood sugars with current diet

## 2022-03-24 NOTE — PLAN OF CARE
Problem: Skin Integrity:  Goal: Absence of new skin breakdown  Description: Absence of new skin breakdown  3/24/2022 0449 by Jazzy Rogers RN  Outcome: Ongoing     Problem: Falls - Risk of:  Goal: Will remain free from falls  Description: Will remain free from falls  3/24/2022 0449 by Jazzy Rogers RN  Outcome: Ongoing  Note: No falls or injuries sustained at this time. No attempts to get out of bed without nursing assistance. Call light within reach and pt. uses appropriately for assistance. Siderails up x 2. Bed in low and locked position. Hourly nursing rounds made. Pt. Alert and oriented, aware of limitations, and exhibits good safety judgement. Pt. uses assistive devices appropriately. Pt. understands individual fall risk factors. Pt. reoriented to surroundings and reminded to use call light with each nurse/patient interaction. Bed alarm / Personal alarm remains engaged throughout the shift as a precaution.

## 2022-03-24 NOTE — CONSULTS
[place] [address] Internal Medicine    CONSULTATION / HISTORY AND PHYSICAL EXAMINATION            Date:   3/24/2022  Patient name:  Farhan Egan  Date of admission:  3/23/2022  4:52 PM  MRN:   [id number]  Account:  [de-identified]  YOB: 1950  PCP:    Abraham Still MD  Room:   [facility]262-  Code Status:    Full Code    Physician Requesting Consult: Stacy Prather MD    Reason for Consult:  Medical management    Chief Complaint:     No chief complaint on file. Debility    History Obtained From:     Patient, EMR, nursing staff    History of Present Illness:     23-year-old female initially presented to the multiple falls over several weeks in the setting of chronic cervical spine stenosis with myelopathy status post cervical fusion follows with neurosurgery  Recently started on Eliquis for acute DVT right leg  Trauma work-up CT brain in this admission unremarkable other than multiple remote and healing rib fractures, patient uses walker at home  Neurology review was obtained for worsening gait instability-MRI thoracic spine did show T2-T3 and T5-T7 moderate neural foraminal narrowing  Also patient was noted to have bilateral leg swelling with some stasis dermatitis, early cellulitis-started on diuresis as well as antibiotics      Past Medical History:     Past Medical History:   Diagnosis Date    Allergic rhinitis, cause unspecified     Back pain     lumbar    Bowel obstruction (HCC)     history of due to scar tissue, resolved non-surgically    C. difficile diarrhea     CAD (coronary artery disease)     no stent needed per pt.  Dr. Sandra Fernandez did cath at  2005    Cardiac murmur     Cellulitis     left leg    Cellulitis 2017 August    leg left leg/bug bite    Cerebral artery occlusion with cerebral infarction [facility])     TIA 2014    COVID-19     ONE YR AGO IN 4/25/2020 fever and cough    Diverticulosis of colon (without mention of hemorrhage)     GERD (gastroesophageal reflux disease)     GERD (gastroesophageal reflux disease)     on rx    History of blood transfusion     approx 2020        History of CHF (congestive heart failure)     History of MI (myocardial infarction) 2005    thought due to a blood clot    History of ovarian cyst 1970    had oopherectomy holly    History of peritonitis 1968    due to ruptured appendix age 13    HTN (hypertension)     Hx of blood clots     right leg    Hyperlipidemia     Intestinal or peritoneal adhesions with obstruction (postoperative) (postinfection) ([address]     Kidney infection     renal failure/sepsis/spider bite    Lateral epicondylitis  of elbow     MDRO (multiple drug resistant organisms) resistance     c diff    Muscle strain     right posterior shoulder    Other abnormal glucose     PONV (postoperative nausea and vomiting)     dry heaves    Pre-diabetes     Restless legs syndrome (RLS)     Snores     no cpap    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)     TIA (transient ischemic attack) 2014    Uses walker     Vitamin D deficiency     Wears glasses     Wellness examination     last seen 2 weeks ago        Past Surgical History:     Past Surgical History:   Procedure Laterality Date    ABDOMEN SURGERY  1976    benign tumor removed near remaining ovary, 1.5 pounds    APPENDECTOMY  1968    appendix ruptured, developed peritonitis    BACK SURGERY      BUNIONECTOMY Left     along with calcium deposits removed   [address]  2005    negative    CERVICAL FUSION  05/21/2021    POSTERIOR C3-6 LAMINECTOMY, PARTIAL C7 LAMINECTOMY, FUSION C3-C6, SILVERCORD    CERVICAL FUSION N/A 5/21/2021    POSTERIOR C3-6 LAMINECTOMY, PARTIAL C7 LAMINECTOMY, FUSION C3-C6, SILVERCORD performed by Keli Israel DO at [address]    12 INCHES REMOVED D/T OBSTRUCTION    COLONOSCOPY      CYST REMOVAL Right     right facial    HYSTERECTOMY  1973    taken as a result of recurring cysts    LUMBAR FUSION N/A 02/10/2020    LUMBAR L4-5 POSTERIOR  DECOMPRESSION INSTRUMENTATION FUSION WCEMENT AUGMENTATION/ performed by Samira Devine MD at [address] [facility] N/A 06/17/2020    L5-S1 PLIF L4-L5 REVISION performed by Samira Devine MD at [address]  08/14/2014    FESS    OVARY REMOVAL  1970    UNILATERAL due to cyst    OVARY REMOVAL  1971    partial, due to cyst    SINUS SURGERY  2004    UPPER GASTROINTESTINAL ENDOSCOPY N/A 05/31/2019    EGD ESOPHAGOGASTRODUODENOSCOPY performed by Noble Dong MD at [address] N/A 08/05/2019    EGD BIOPSY performed by Natanael Westfall MD at [address] [facility] N/A 08/23/2019    EGD BIOPSY performed by Noble Dong MD at [address] [facility] 03/05/2019    WRIST OPEN REDUCTION INTERNAL FIXATION performed by Clara Dickson MD at [address]        Medications Prior to Admission:     Prior to Admission medications    Medication Sig Start Date End Date Taking?  Authorizing Provider   amLODIPine (NORVASC) 10 MG tablet Take 1 tablet by mouth daily 3/10/22   Dieudonne Patel MD   furosemide (LASIX) 40 MG tablet Take 1 tablet by mouth 2 times daily 3/1/22   Justo Dance, APRN - CNP   carvedilol (COREG) 12.5 MG tablet Take 1 tablet by mouth 2 times daily 2/23/22   Justo Dance, APRN - CNP   apixaban (ELIQUIS) 5 MG TABS tablet Please take 2 tablets by mouth for the first week then take 1 tablet by mouth BID for acute DVT  Patient taking differently: Take 5 mg by mouth 2 times daily take 1 tablet by mouth BID for acute DVT 2/17/22   Justo Dance, APRN - CNP   atorvastatin (LIPITOR) 80 MG tablet Take 0.5 tablets by mouth nightly 2/1/22   Justo Dance, APRN - CNP   lisinopril (PRINIVIL;ZESTRIL) 30 MG tablet Take 1 tablet by mouth daily 1/21/22   Dong Lan MD   fenofibrate micronized (LOFIBRA) 134 MG capsule Take 1 capsule by mouth every morning (before breakfast) 1/13/22   Giuseppe An MD   pramipexole (MIRAPEX) 0.5 MG tablet Take 1 tablet by mouth nightly 1/6/22   Giuseppe An MD   citalopram (CELEXA) 20 MG tablet Take 1 tablet by mouth daily 1/3/22   Vinita Fuentes MD   nitroGLYCERIN (NITROSTAT) 0.4 MG SL tablet Place 1 tablet under the tongue every 5 minutes as needed for Chest pain 9/1/21   Giuseppe An MD   docusate sodium (COLACE) 100 MG capsule Take 1 capsule by mouth 2 times daily as needed for Constipation 5/30/21   Krupa Durham Marley, DO   diphenhydrAMINE HCl (BENADRYL ALLERGY PO) Take 1 capsule by mouth daily Takes q HS    Historical Provider, MD   cyanocobalamin ([employer] VITAMIN B12) 1000 MCG tablet Take 1 tablet by mouth daily 12/3/20   Giuseppe An MD   ferrous sulfate (IRON 325) 325 (65 Fe) MG tablet Take 1 tablet by mouth 2 times daily 7/2/20   Regina Guajardo MD   VITAMIN D, ERGOCALCIFEROL, PO Take by mouth    Historical Provider, MD   Lancets MISC 1 each by Does not apply route daily 10/10/19   Lavon Gilbert MD   blood glucose monitor strips Test 2 times a day & as needed for symptoms of irregular blood glucose. 10/10/19   Lavon Gilbert MD   Calcium Carbonate-Vitamin D (CALCIUM 500/D) 500-125 MG-UNIT TABS Take 1 tablet by mouth daily     Historical Provider, MD        Allergies:     Bactrim [sulfamethoxazole-trimethoprim], Codeine, and Seasonal    Social History:     Tobacco:    reports that she quit smoking about 4 years ago. Her smoking use included cigarettes. She started smoking about 26 years ago. She has a 10.00 pack-year smoking history. She has never used smokeless tobacco.  Alcohol:      reports no history of alcohol use. Drug Use:  reports no history of drug use.     Family History:     Family History   Problem Relation Age of Onset    Stroke Mother     Diabetes Mother     Heart Disease Mother     High Blood Pressure Mother     Heart Disease Father     Heart Disease Brother     High Blood Pressure Brother     Heart Disease Maternal Grandmother     High Blood Pressure Sister        Review of Systems:     Positive and Negative as described in HPI. CONSTITUTIONAL:  negative for fevers, chills, sweats, fatigue, weight loss  HEENT:  negative for vision, hearing changes, runny nose, throat pain  RESPIRATORY:  negative for shortness of breath, cough, congestion, wheezing. CARDIOVASCULAR:  negative for chest pain, palpitations. GASTROINTESTINAL:  negative for nausea, vomiting, diarrhea, constipation, change in bowel habits, abdominal pain   GENITOURINARY:  negative for difficulty of urination, burning with urination, frequency   INTEGUMENT:  negative for rash, skin lesions, easy bruising   HEMATOLOGIC/LYMPHATIC:  negative for swelling/edema   ALLERGIC/IMMUNOLOGIC:  negative for urticaria , itching  ENDOCRINE:  negative increase in drinking, increase in urination, hot or cold intolerance  MUSCULOSKELETAL:  negative joint pains, muscle aches, swelling of joints  NEUROLOGICAL:  negative for headaches, dizziness, lightheadedness, numbness, pain, tingling extremities      Physical Exam:     BP (!) 135/59   Pulse 65   Temp 98.1 °F (36.7 °C)   Resp 16   SpO2 93%   Temp (24hrs), Av.3 °F (36.8 °C), Min:98.1 °F (36.7 °C), Max:98.4 °F (36.9 °C)    No results for input(s): POCGLU in the last 72 hours. No intake or output data in the 24 hours ending 22 1510    General Appearance:  alert, well appearing, and in no acute distress  Head:  normocephalic, atraumatic.   Eye: no icterus, redness, pupils equal and reactive, extraocular eye movements intact, conjunctiva clear  Ear: normal external ear, no discharge, hearing intact  Nose:  no drainage noted  Mouth: mucous membranes moist  Neck: supple, no carotid bruits, thyroid not palpable  Lungs: Bilateral equal air entry, clear to ausculation, no wheezing, rales or rhonchi, normal effort  Cardiovascular: normal rate, regular rhythm, no murmur, gallop, rub.   Abdomen: Soft, nontender, nondistended, normal bowel sounds, no hepatomegaly or splenomegaly  Neurologic: There are no new focal motor or sensory deficits, normal muscle tone and bulk, no abnormal sensation, normal speech, cranial nerves II through XII grossly intact  Skin: No gross lesions, rashes, bruising or bleeding on exposed skin area  Extremities:  peripheral pulses palpable, no pedal edema or calf pain with palpation  Psych: normal affect    Investigations:      Laboratory Testing:  Recent Results (from the past 24 hour(s))   CBC auto differential    Collection Time: 03/24/22  6:36 AM   Result Value Ref Range    WBC 6.5 3.5 - 11.0 k/uL    RBC 3.65 (L) 4.0 - 5.2 m/uL    Hemoglobin 11.4 (L) 12.0 - 16.0 g/dL    Hematocrit 34.2 (L) 36 - 46 %    MCV 93.9 80 - 100 fL    MCH 31.4 26 - 34 pg    MCHC 33.4 31 - 37 g/dL    RDW 14.1 11.5 - 14.9 %    Platelets 941 188 - 937 k/uL    MPV 7.2 6.0 - 12.0 fL    Seg Neutrophils 59 36 - 66 %    Lymphocytes 26 24 - 44 %    Monocytes 6 1 - 7 %    Eosinophils % 8 (H) 0 - 4 %    Basophils 1 0 - 2 %    Segs Absolute 3.90 1.3 - 9.1 k/uL    Absolute Lymph # 1.70 1.0 - 4.8 k/uL    Absolute Mono # 0.40 0.1 - 1.3 k/uL    Absolute Eos # 0.50 (H) 0.0 - 0.4 k/uL    Basophils Absolute 0.00 0.0 - 0.2 k/uL   Basic Metabolic Panel    Collection Time: 03/24/22  6:36 AM   Result Value Ref Range    Glucose 117 (H) 70 - 99 mg/dL    BUN 55 (H) 8 - 23 mg/dL    CREATININE 1.21 (H) 0.50 - 0.90 mg/dL    Calcium 9.4 8.6 - 10.4 mg/dL    Sodium 143 135 - 144 mmol/L    Potassium 4.6 3.7 - 5.3 mmol/L    Chloride 104 98 - 107 mmol/L    CO2 27 20 - 31 mmol/L    Anion Gap 12 9 - 17 mmol/L    GFR Non-African American 44 (L) >60 mL/min    GFR  53 (L) >60 mL/min    GFR Comment             Imaging/Diagonstics:  Recent data reviewed    Assessment :      Primary Problem  Cervical myelopathy [facility])    Active Hospital Problems    Diagnosis Date Noted    [facility] [G95.9] 03/17/2022       Plan:     Multiple falls, gait instability, multiple remote and healing rib fractures leading to chest pain-needs PT OT  Neural foraminal narrowing of thoracic spine-neurology as reviewed-recommend rehab and physical therapy  Hypertension-continue amlodipine, Coreg  Right leg DVT-continue Eliquis  Chronic diastolic CHF-currently compensated-continue Coreg, Lipitor, Lasix, lisinopril  Depression -patient noted to be very tearful today-is already on maximal dose of  Celexa, will add BuSpar    DVT prophylaxis-patient on Eliquis    Consultations:   Valerie Mathews MD  3/24/2022  3:10 PM    Copy sent to Dr. Kemi Salas MD    Please note that this chart was generated using voice recognition Dragon dictation software. Although every effort was made to ensure the accuracy of this automated transcription, some errors in transcription may have occurred.

## 2022-03-24 NOTE — PLAN OF CARE
Problem: Skin Integrity:  Goal: Will show no infection signs and symptoms  Description: Will show no infection signs and symptoms  Outcome: Ongoing  Goal: Absence of new skin breakdown  Description: Absence of new skin breakdown  3/24/2022 1507 by Elaine Martins RN  Outcome: Ongoing  3/24/2022 0449 by Antolin Vargas RN  Outcome: Ongoing     Problem: Falls - Risk of:  Goal: Will remain free from falls  Description: Will remain free from falls  3/24/2022 1507 by Elaine Martins RN  Outcome: Ongoing  3/24/2022 0449 by Antolin Vargas RN  Outcome: Ongoing  Note: No falls or injuries sustained at this time. No attempts to get out of bed without nursing assistance. Call light within reach and pt. uses appropriately for assistance. Siderails up x 2. Nonskid footwear remains on. Bed in low and locked position. Hourly nursing rounds made. Pt. Alert and oriented, aware of limitations, and exhibits good safety judgement. Pt. uses assistive devices appropriately. Pt. understands individual fall risk factors. Pt. reoriented to surroundings and reminded to use call light with each nurse/patient interaction. Bed alarm / Personal alarm remains engaged throughout the shift as a precaution. Goal: Absence of physical injury  Description: Absence of physical injury  Outcome: Ongoing     Problem: Pain:  Goal: Pain level will decrease  Description: Pain level will decrease  3/24/2022 1507 by Elaine Martins RN  Outcome: Ongoing  3/24/2022 0449 by Antolin Vargas RN  Outcome: Ongoing  Note: Pain assessment completed. Pt. able to rest.  Patient medicated with Tylenol 650mg  for pain this shift as ordered. Patient relates a tolerable level of discomfort with the current medication. Pt. Repositions with assistance for comfort. Nonverbal cues indicate pain relief. Pt. Rests quietly with eyes closed after pain medication administration. Respirations easy and unlabored. Appears free from distress.     Goal: Control of acute pain  Description: Control of acute pain  Outcome: Ongoing  Goal: Control of chronic pain  Description: Control of chronic pain  Outcome: Ongoing     Problem: Mobility - Impaired:  Goal: Mobility will improve  Description: Mobility will improve  3/24/2022 1507 by Elaine Martins RN  Outcome: Ongoing  3/24/2022 0449 by Antolin Vargas RN  Outcome: Ongoing     Problem: Musculor/Skeletal Functional Status  Goal: Highest potential functional level  Outcome: Ongoing  Goal: Absence of falls  Outcome: Ongoing     Problem: Musculor/Skeletal Functional Status  Goal: Highest potential functional level  Outcome: Ongoing  Goal: Absence of falls  Outcome: Ongoing

## 2022-03-24 NOTE — PROGRESS NOTES
[address]   Acute Rehabilitation OT Evaluation  Date: 3/24/22  Patient Name: Jose Cruz Chang       Room: 4642/9359-59  MRN: [id number]  Account: [de-identified]   : 1950  (71 y.o.) Gender: female     Referring Practitioner: Sindhu Menendez MD  Diagnosis: Cervical myelopathy  Additional Pertinent Hx: 71 y.o.  female, with a history of anemia, spondylolisthesis, chronic DVT, chronic diastolic heart failure, CVA, major depressive disorder, s/p cervical spine fusion, stenosis of cervical spine with myelopathy, and DM type II, who presents with leg pain, shortness of breath, fall, and neck pain. According to patient and her , patient has had multiple falls recently including a fall Monday and evening and again on Tuesday. Patient reports that today she felt extremely weak upon waking.      Treatment Diagnosis: Impaired self-care status   Past Medical History:  has a past medical history of Allergic rhinitis, cause unspecified, Back pain, Bowel obstruction (HCC), C. difficile diarrhea, CAD (coronary artery disease), Cardiac murmur, Cellulitis, Cellulitis, Cerebral artery occlusion with cerebral infarction ([address] COVID-19, Diverticulosis of colon (without mention of hemorrhage), GERD (gastroesophageal reflux disease), GERD (gastroesophageal reflux disease), History of blood transfusion, History of CHF (congestive heart failure), History of MI (myocardial infarction), History of ovarian cyst, History of peritonitis, HTN (hypertension), Hx of blood clots, Hyperlipidemia, Intestinal or peritoneal adhesions with obstruction (postoperative) (postinfection) ([address] Kidney infection, Lateral epicondylitis  of elbow, MDRO (multiple drug resistant organisms) resistance, Muscle strain, Other abnormal glucose, PONV (postoperative nausea and vomiting), Pre-diabetes, Restless legs syndrome (RLS), Snores, 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[address] TIA (transient ischemic attack), Uses walker, Vitamin D deficiency, Wears glasses, and Wellness examination. Past Surgical History:   has a past surgical history that includes Ovary removal (1970); colectomy (1969); Appendectomy (1968); Ovary removal (1971); Hysterectomy (1973); Bunionectomy (Left); sinus surgery (2004); Colonoscopy; other surgical history (08/14/2014); cyst removal (Right); Wrist fracture surgery (Left, 03/05/2019); Upper gastrointestinal endoscopy (N/A, 05/31/2019); Upper gastrointestinal endoscopy (N/A, 08/05/2019); Upper gastrointestinal endoscopy (N/A, 08/23/2019); Abdomen surgery (1976); lumbar fusion (N/A, 02/10/2020); Cardiac catheterization; Cardiac catheterization (2005); [place] tooth extraction; lumbar fusion (N/A, 06/17/2020); back surgery; cervical fusion (05/21/2021); and cervical fusion (N/A, 5/21/2021).     Restrictions  Restrictions/Precautions: General Precautions,Fall Risk  Implants present? : Metal implants (cervical and lumbar surgeries, L wrist ORIF)  Other position/activity restrictions: up as tolerated  Required Braces or Orthoses?: No    Vitals  Temp: 98.1 °F (36.7 °C)  Pulse: 65  Resp: 16  BP: (!) 135/59  Oxygen Therapy  SpO2: 93 %  O2 Device: None (Room air)  Level of Consciousness: Alert (0)    Subjective  Subjective: \"My ribs hurt really badly when I start moving\" Pt reports increased pain in rib cage upon movement this AM  Comments: Pt pleasent and agreeable for OT eval this AM  Pain Level: 8 (with mobility )  Pain Location: Chest  Pain Orientation: Right  Orientation  Overall Orientation Status: Within Functional Limits  Vision  Vision: Impaired  Vision Exceptions: Wears glasses for reading  Hearing  Hearing: Within functional limits  Social/Functional History  Lives With: Spouse  Type of Home: House ([facility])  Home Layout: One level,Laundry in basement (Pt does not go down to basement, daughter does laundry)  Home Access: Stairs to enter without rails  Entrance Stairs - Number of Steps: 1  Bathroom Shower/Tub: Tub/Shower unit,Curtain,Shower chair with back  Bathroom Toilet: Standard  Bathroom Equipment: Shower chair,Grab bars in shower,Hand-held shower,Toilet raiser,Grab bars around toilet  Bathroom Accessibility: Walker accessible  Home Equipment: Hospital bed,Rolling walker,4 wheeled walker,Cane,Lift chair,Grab bars,Reacher,Sock aid (pt states she needs new sock aid)  Receives Help From: Family  ADL Assistance: Needs assistance (daughter A with shower t/f 2x/week, IND dressing/toileting)  Homemaking Assistance: Needs assistance (daughter does laundry, pt helps with meals)  Homemaking Responsibilities: Yes ( grocery shops)  Ambulation Assistance: Independent (uses RW)  Transfer Assistance: Needs assistance ( lift chair, A with stairs/transfers to shower)  Active : No  Patient's  Info: family  Occupation: Retired  Type of occupation: hairdresser  IADL Comments: sleeps in lift chair  Additional Comments:  is retired and assists prn - hx of CABG. Daughter works full time - assists with pt 2x/week on medications, laundry, etc. Lives close by.    Pain Assessment  Pain Assessment: 0-10  Pain Level: 8 (with mobility )  Pain Type: Acute pain  Pain Location: Chest  Pain Orientation: Right  Pain Descriptors: Sharp  Pain Frequency: Intermittent (with mobility)    Objective      Cognition  Overall Cognitive Status: WFL   Perception  Overall Perceptual Status: WFL  Sensation  Overall Sensation Status: Impaired (numbess in R thigh- Burning/niumbness)     UE Function  Hand Dominance  Hand Dominance: Left        LUE Strength  Gross LUE Strength: Exceptions to [facility]  L Hand General: 3+/5  LUE Strength Comment: Grossly   Left Hand Strength -  (lbs)  Handle Setting 2: 30#(31, 28, 31#) norm 32-54  LUE Tone: Normotonic     LUE AROM (degrees)  LUE AROM : WFL     Left Hand AROM (degrees)  Left Hand AROM: WFL  RUE Strength  Gross RUE Strength: Exceptions to [facility]  R Hand General: 3+/5  RUE Strength Comment: Grossly   Right Hand Strength -  (lbs)  Handle Setting 2: 35# (33, 38, 35#) norm 32-54#  RUE Tone: Normotonic     RUE AROM (degrees)  RUE AROM : WFL     Right Hand AROM (degrees)  Right Hand AROM: WFL           RUE Edema Present?: No  Left 9-Hole Peg Test: Impaired  Left 9 Hole Peg Test Time (secs): 60 (norm 21-30s)  Right 9-Hole Peg Test: Impaired  Right 9 Hole Peg Test Time (secs): 33 (norm 21-33s)         Fine Motor Skills  Coordination  Movements Are Fluid And Coordinated: No  Coordination and Movement description: Fine motor impairments,Left UE          LUE Edema - Circumference (cm)  LUE Edema Present?: No     RUE Edema - Circumference (cm)  RUE Edema Present?: No          Mobility  Supine to Sit: Minimal assistance       Balance  Sitting Balance: Stand by assistance  Standing Balance: Minimal assistance (1-2 UE support )  Standing Balance  Time: ~1-2 minutes  Activity: transfer/self care  Comment: 1-2 UE support; grab bars  Functional Mobility  Functional - Mobility Device: Wheelchair  Activity: To/from bathroom  Assist Level: Minimal assistance  Functional Mobility Comments: Able to self propell to/from bathroom with increased time. assistance required to position wheelchair in shower  Bed mobility  Rolling to Right: Minimal assistance  Supine to Sit: Minimal assistance  Scooting: Maximal assistance  Comment: Pt reports increased pain with scooting in ribs. Pain increases to 10/10 by pt report and observation of pt facial expression     Transfers  Stand Pivot Transfers: Minimal assistance  Sit to stand: Minimal assistance  Stand to sit: Minimal assistance  Transfer Comments: 1-2 UE support on grab bars; VC's for hand placement; L foot blocking due to slidding   Toilet Transfers  Toilet - Technique: Stand pivot  Equipment Used: Grab bars  Toilet Transfer: Moderate assistance  Toilet Transfers Comments: Per RN report  Shower Transfers  Shower - Transfer From: Wheelchair  Shower - Transfer Type:  To and From  Shower - Transfer To: Shower seat with back  Shower - Technique: Stand pivot  Shower Transfers: Minimal assistance  Shower Transfers Comments: VC's for hand placement; blocking of L foot due to slidding   Functional Activity Tolerance  Functional Activity Tolerance: Tolerates 30 min exercise with multiple rests   Activity Tolerance: Patient Tolerated treatment well         ADL     ADL  Feeding: Setup; Increased time to complete (pt reports diffulty keeping food on utensils )  Grooming: Setup  UE Bathing: Stand by assistance  LE Bathing: Stand by assistance; Increased time to complete (weight shifting for personal hygiene; difficult to reach BLE)  UE Dressing: Stand by assistance  LE Dressing: Moderate assistance (thread BLE; min A stand to pull up clothing; socks)  Toileting: Dependent/Total (per nursing report )  Additional Comments: Pt able to self propell to/from bathroom SBA  with increased time. Pt placed in wheelchair onto shower ramp and transfered wheelchair<>shower chair Min A with VC's for hand placement and blocking of L foot due to slipping. No LOB noted, however pt reporting increased anxiety and fear of falling with transfers/functional mobility. Pt completed UB/LB bathing SBA while seated on shower seat, utilizing weight shifting method to complete personal hygiene and increased time/effort required to was distal BLE. Pt reports use of long handled sponge at home. Pt completed UB dressing SBA while seated in shower. Mod A for LB dressing to don/doff socks, thread LLE and standing Min A to pull up LB clothing. Pt completed grooming witth Set up assitance while seated at sink. No LOB throughout AM session. Upon measuring for PRAVEEN hose, pt's LLE observed to be ~1in shorter than RLE. PT Stephanie notified. PM: Pt required Min A bed<> recliner transfer. pt pain increases to 10/10 when attempting to scoot back in recliner. 2 pillows placed behind pt back for further support to decrease pain.  Pt resonded well. Pt reporting problems keeping food on utensils and increased time to eat meals due to difficulty gripping with L hand. Pt also reporting trouble with handwritting. Pt provided built up  for use on utensils/writting insturments. S/OT demonstrated use. Pt demonstrated Good undertanding of built up  with improved penmanship when tracing letters on sheet    Goals  Patient Goals   Patient goals :  \"To be able to put my pants/socks on better\"  Short term goals  Time Frame for Short term goals: 1 week   Short term goal 1: Pt will complete LB dressing/bathing/toileting CGA with Good safety with use of AE/DME/modified techniques for increased IND with self care  Short term goal 2: Pt will participate in 30+ minutes functional activity for increased strength/balance/endurance for increased IND in ADL's  Short term goal 3: Pt will complete transfers/functional mobility CGA with Good safety and RW for increased IND in ADL's  Short term goal 4: Pt will verbalize/demonstrate Good understanding of AE/DME/modified techniques for increased IND in self care  Short term goal 5: Pt will complete UB bathing/dressing/grooming with Supervision for increased IND in self care  Long term goals  Time Frame for Long term goals : by discharge   Long term goal 1: Pt will complete toileting/grooming/dressing Mod I and bathing with Supervison with Good safety with use of AE/DME/modified techniques for increased IND with self care  Long term goal 2: Pt will complete functional mobility/transferes during functional activity Mod I with Good safety and RW for increased IND in ADL's  Long term goal 3: Pt will verbalize/demonstrate Good understanding of fall prevention strategies for increased safety/IND in self care  Long term goal 4: Pt will improve L hand strength for self care as evidence by a 3# improvement in dynomometor testing   Long term goal 5: Pt will improve L FMC as evidence by a 20 second improvement on 9 hole peg test for increased IND with self care    Assessment  Performance deficits / Impairments: Decreased functional mobility ,Decreased ADL status,Decreased strength,Decreased endurance,Decreased sensation,Decreased balance,Decreased high-level IADLs,Decreased fine motor control,Decreased coordination  Treatment Diagnosis: Impaired self-care status  Prognosis: Good  Decision Making: High Complexity  REQUIRES OT FOLLOW UP: Yes  Discharge Recommendations: Patient would benefit from continued therapy after discharge  Plan  Times per week: 5-7  Times per day: Twice a day  Current Treatment Recommendations: Balance Training,Functional Mobility Training,Endurance Training,Strengthening,ROM,Safety Education & Training,Patient/Caregiver Education & Training,Equipment Evaluation, Education, & procurement,Self-Care / Judye Gravely Management             03/24/22 1007 03/24/22 1351   OT Individual Minutes   Time In [id number]   Time Out [id number]   [id number]   Time Code Minutes    Timed Code Treatment Minutes 39 Minutes 31 Minutes     Electronically signed by KLEVER Rutherford/OT on 3/24/22 at 3:18 PM EDT

## 2022-03-24 NOTE — PROGRESS NOTES
Physical Therapy  Facility/Department: [facility] ACUTE REHAB  Daily Treatment Note  NAME: Anthony Schrader  : 1950  MRN: [id number]    Date of Service: 3/24/2022    Discharge Recommendations:  Patient would benefit from continued therapy after discharge        Assessment      PT Education: Goals;PT Role;Plan of Care;Home Exercise Program;Transfer Training;Energy Conservation;General Safety; Adaptive Device Training;Gait Training;Functional Mobility Training;Pressure Relief; Injury Prevention  Activity Tolerance  Activity Tolerance: Patient limited by pain; Patient limited by fatigue;Patient limited by endurance     Patient Diagnosis(es): There were no encounter diagnoses. has a past medical history of Allergic rhinitis, cause unspecified, Back pain, Bowel obstruction (HCC), C. difficile diarrhea, CAD (coronary artery disease), Cardiac murmur, Cellulitis, Cellulitis, Cerebral artery occlusion with cerebral infarction ([address] COVID-19, Diverticulosis of colon (without mention of hemorrhage), GERD (gastroesophageal reflux disease), GERD (gastroesophageal reflux disease), History of blood transfusion, History of CHF (congestive heart failure), History of MI (myocardial infarction), History of ovarian cyst, History of peritonitis, HTN (hypertension), Hx of blood clots, Hyperlipidemia, Intestinal or peritoneal adhesions with obstruction (postoperative) (postinfection) ([address] Kidney infection, Lateral epicondylitis  of elbow, MDRO (multiple drug resistant organisms) resistance, Muscle strain, Other abnormal glucose, PONV (postoperative nausea and vomiting), Pre-diabetes, Restless legs syndrome (RLS), Snores, 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[address] TIA (transient ischemic attack), Uses walker, Vitamin D deficiency, Wears glasses, and Wellness examination. has a past surgical history that includes Ovary removal (); colectomy (); Appendectomy (); Ovary removal (); Hysterectomy ();  Bunionectomy (Left); sinus surgery (2004); Colonoscopy; other surgical history (08/14/2014); cyst removal (Right); Wrist fracture surgery (Left, 03/05/2019); Upper gastrointestinal endoscopy (N/A, 05/31/2019); Upper gastrointestinal endoscopy (N/A, 08/05/2019); Upper gastrointestinal endoscopy (N/A, 08/23/2019); Abdomen surgery (1976); lumbar fusion (N/A, 02/10/2020); Cardiac catheterization; Cardiac catheterization (2005); [place] tooth extraction; lumbar fusion (N/A, 06/17/2020); back surgery; cervical fusion (05/21/2021); and cervical fusion (N/A, 5/21/2021). Restrictions  Restrictions/Precautions  Restrictions/Precautions: General Precautions,Fall Risk  Required Braces or Orthoses?: No  Implants present? : Metal implants (cervical and lumbar surgeries, L wrist ORIF)  Position Activity Restriction  Other position/activity restrictions: up as tolerated     Subjective   General  Chart Reviewed: Yes  Additional Pertinent Hx: TIA  Response To Previous Treatment: Patient with no complaints from previous session. Family / Caregiver Present: No  Referring Practitioner: Dr Percy Enrique. Subjective  Subjective: Pt reported that she had multiple falls last week, starting Monday the 14th. Pt reports that she was doing a lot better before that. Pain Screening  Patient Currently in Pain: Yes  Pain Assessment  Pain Assessment: Faces  Stanley-Baker Pain Rating: Hurts even more  Pain Type: Acute pain  Pain Location: Chest  Pain Orientation: Mid  Non-Pharmaceutical Pain Intervention(s): Repositioned;Rest;Ambulation/Increased Activity; Distraction  Response to Pain Intervention: None  Vital Signs  Patient Currently in Pain: Yes       Orientation  Orientation  Overall Orientation Status: Within Normal Limits    Objective   Bed mobility  Sit to Supine: Minimal assistance  Scooting: Maximal assistance  Transfers  Sit to Stand:  Moderate Assistance (Difficulty pushing off with her UE during sit<>stand)  Stand to sit: Minimal Assistance (Difficulty flexing L LE during stand>sit, hold L LE off the )  Bed to Chair: Minimal assistance; Moderate assistance (fluctuates min/mod A)  Stand Pivot Transfers: Contact guard assistance  Comment: mod A from bed, min A from chair, RW used for transfers. Ambulation  Ambulation?: Yes  Ambulation 1  Surface: level tile  Device: Rolling Walker  Assistance: Minimal assistance  Quality of Gait: slow tawanda with step to pattern, flat foot LE at contact, decreased stance on L LE; downward gaze and cervical flexion  Gait Deviations: Decreased step length;Decreased step height;Shuffles; Slow Tawanda  Distance: 43 feet  Comments: Pt is min A for safety due to compensation of  LLE and poor endurance. Pt has decreased step length and height with intermittent shuffling gait. Pt needs increase time for amb due to slow tawanda. Other exercises  Other exercises?: Yes  Other exercises 1: Standing Ex with RW: BLE 10 reps  Other exercises 2: Seated Ex: BLE 10 reps  Other exercises 3: Edu on impotrance of posture when eating and drinking and also for safety when amb in hallway. Other exercises 4: Edu on safe transfers      Goals  Short term goals  Time Frame for Short term goals: 1 week  Short term goal 1: Pt to demostrate bed mobility CGA/Jennifer. Short term goal 2: Pt to perform transfers CGA. Short term goal 3: Pt to amb 100'-150' with device, CGA. Short term goal 4: Pt to improve BLE strength by 1/2 MMG. Short term goal 5: Pt to improve standing balance to [facility]. Short term goal 6: Pt able to perform 4\" and 6\" steps x3 in //bars or acute stair way, with 2 B UE support, min A  Long term goals  Time Frame for Long term goals : By DC  Long term goal 1: Pt able to perform bed mobility at SBA  Long term goal 2: Pt able to transfer at supervsion level.   Long term goal 3:  Pt able to ambulate house hold distances with assistive device mod-I  Long term goal 4:  Pt able to ambulate distance of 150 ft, level surfaces and shorter distance outside [facility]. Long term goal 5: Pt able to propel w/c on level surface, distance of 150 ft, supervsion level. Long term goal 6: Improve 2MWT distance to atleast 120 ft to improve overall fucntion. Long term goal 7: Pt to improve Tinetti balance score to atlest 20/28 to reduce fall risk. Long term goal 8: Pt donny to perform cub step with B UE support and/or donny to goup and down 4 to 5 steps with 2 rails at min A   Patient Goals   Patient goals : To get stronger    Plan    Plan  Times per week: 1.5 hr/day, 5 to 7 days/week. Specific instructions for Next Treatment: transfers, amb, balance, standing program  Current Treatment Recommendations: Strengthening,Balance Training,Functional Mobility Training,Transfer Training,Endurance Training,Gait Training,Equipment Evaluation, Education, & procurement,Patient/Caregiver Education & Training,Safety Education & Training,Stair training,Wheelchair Mobility Training  Safety Devices  Type of devices:  All fall risk precautions in place,Call light within reach,Gait belt,Bed alarm in place,Patient at risk for falls,Nurse notified     Therapy Time     03/24/22 1435   PT Individual Minutes   Time In 1435   Time Out 1516   Minutes [address] [place], [place]

## 2022-03-24 NOTE — H&P
Physical Medicine & Rehabilitation History and Physical  [facility] Acute Rehabilitation Unit     Primary care provider: Celia Rogers MD     Chief Complaint and Reason for Rehabilitation Admission:   ADL and Mobility deficits secondary to Cervical myelopathy     History of Present Illness:  Nguyen Skelton  is a 71 y.o. left-handed female admitted to the [address] [place] [address] unit on 3/23/2022. She was originally admitted to [facility] on 3/16/2022 with Leg Pain, Shortness of Breath, Fall, and Neck Pain     She has known history of cervical spine fusion, stenosis cervical spine with myelopathy who presented with leg pain and shortness of breath and fall as well as neck pain she is on multiple falls recently. .  She had bilateral lower lobe edema has known history of DVT is being treated with Eliquis.     Internal medicine: MRI showed T2-3 and T5 7 moderate neural foraminal narrowing. Treated lower extremity edema with Lasix      Neurology: History of C3-6 laminectomies with Dr. Nicole Vincent last year. History of 1 year L arm weakness and left leg weakness prior to cervical spine surgery.  She continues to have frequent falls at home. Checked lumbar MRI and ordered gabapentin. Noted patient is having orthostatic blood pressures. Neurosurgery outpatient note from 3/4 noted ataxia left hand with lost dexterity and falls. Dr. Nicole Vincent referred to neurology for workup of gait instability and ataxia. Patient was seen by me in the office 2/15/22 at which time inpatient acute rehabilitation admission was recommended and her insurance denial was in process of expedited appeal when she admitted to the hospital.    She is currently requiring assistance for self-care activities and mobility prompting this admission. Review of Systems:  CONSTITUTIONAL:  Denies fevers, chills, sweats or fatigue. EYES:  Denies diplopia, blind spots, blurring.   HEENT:  Denies hearing loss, trouble chewing or swallowing. RESPIRATORY:  No wheezing, coughing, shortness of breath. CARDIOVASCULAR:  Denies chest pain, palpitations, lightheadedness. GASTROINTESTINAL:  Denies heartburn, nausea, constipation, diarrhea, abdominal pain. GENITOURINARY:  No urgency, frequency, incontinence, dysuria. ENDOCRINE:  Denies hot or cold intolerance. MUSCULOSKELETAL:  Denies focal joint pain, back pain, neck pain. C/o aching pain sternum from recent fall. NEUROLOGICAL:  Denies focal numbness, tingling, balance loss, headache. BEHAVIOR/PSYCH:  Denies depression, anxiety, memory loss, insomnia. SKIN:  No ulcers, rash, bruises. Premorbid function:  Requiring assistance for ADLs    Current Function:  PT:  Restrictions/Precautions: General Precautions,Fall Risk  Implants present? : Metal implants (cervical and lumbar surgeries, L wrist ORIF)  Other position/activity restrictions: up as tolerated   Transfers  Sit to Stand: Moderate Assistance (Difficulty pushing off with her UE during sit<>stand)  Stand to sit: Minimal Assistance (Difficulty flexing L LE during stand>sit, hold L LE off the )  Bed to Chair: Minimal assistance,Moderate assistance (fluctuates min/mod A)  Stand Pivot Transfers: Contact guard assistance  Comment: mod A from bed, min A from chair, RW used for transfers. Ambulation 1  Surface: level tile  Device: Rolling Walker  Assistance: Minimal assistance  Quality of Gait: slow tawanda with step to pattern, flat foot LE at contact, decreased stance on L LE; downward gaze and cervical flexion  Gait Deviations: Decreased step length,Decreased step height,Shuffles,Slow Tawanda  Distance: 43 feet  Comments: Pt is min A for safety due to compensation of  LLE and poor endurance. Pt has decreased step length and height with intermittent shuffling gait. Pt needs increase time for amb due to slow tawanda. Transfers  Sit to Stand:  Moderate Assistance (Difficulty pushing off with her UE during sit<>stand)  Stand to sit: Minimal Assistance (Difficulty flexing L LE during stand>sit, hold L LE off the )  Bed to Chair: Minimal assistance,Moderate assistance (fluctuates min/mod A)  Stand Pivot Transfers: Contact guard assistance  Comment: mod A from bed, min A from chair, RW used for transfers. Ambulation  Ambulation?: Yes  Ambulation 1  Surface: level tile  Device: Rolling Walker  Assistance: Minimal assistance  Quality of Gait: slow tawanda with step to pattern, flat foot LE at contact, decreased stance on L LE; downward gaze and cervical flexion  Gait Deviations: Decreased step length,Decreased step height,Shuffles,Slow Tawanda  Distance: 43 feet  Comments: Pt is min A for safety due to compensation of  LLE and poor endurance. Pt has decreased step length and height with intermittent shuffling gait. Pt needs increase time for amb due to slow tawanda. Surface: level tile  Ambulation 1  Surface: level tile  Device: Rolling Walker  Assistance: Minimal assistance  Quality of Gait: slow tawanda with step to pattern, flat foot LE at contact, decreased stance on L LE; downward gaze and cervical flexion  Gait Deviations: Decreased step length,Decreased step height,Shuffles,Slow Tawanda  Distance: 43 feet  Comments: Pt is min A for safety due to compensation of  LLE and poor endurance. Pt has decreased step length and height with intermittent shuffling gait. Pt needs increase time for amb due to slow tawanda. OT:   ADL  Feeding: Setup,Increased time to complete (pt reports diffulty keeping food on utensils )  Grooming: Setup  UE Bathing: Stand by assistance  LE Bathing: Stand by assistance,Increased time to complete (weight shifting for personal hygiene; difficult to reach BLE)  UE Dressing: Stand by assistance  LE Dressing: Moderate assistance (thread BLE; min A stand to pull up clothing; socks)  Toileting: Dependent/Total (per nursing report )  Additional Comments: Pt able to self propell to/from bathroom SBA  with increased time. Pt placed in wheelchair onto shower ramp and transfered wheelchair<>shower chair Min A with VC's for hand placement and blocking of L foot due to slipping. No LOB noted, however pt reporting increased anxiety and fear of falling with transfers/functional mobility. Pt completed UB/LB bathing SBA while seated on shower seat, utilizing weight shifting method to complete personal hygiene and increased time/effort required to was distal BLE. Pt reports use of long handled sponge at home. Pt completed UB dressing SBA while seated in shower. Mod A for LB dressing to don/doff socks, thread LLE and standing Min A to pull up LB clothing. Pt completed grooming witth Set up assitance while seated at sink. No LOB throughout AM session. Upon measuring for PRAVEEN hose, pt's LLE observed to be ~1in shorter than RLE. PT Stephanie notified. PM: Pt required Min A bed<> recliner transfer. pt pain increases to 10/10 when attempting to scoot back in recliner. 2 pillows placed behind pt back for further support to decrease pain. Pt resonded well. Pt reporting problems keeping food on utensils and increased time to eat meals due to difficulty gripping with L hand. Pt also reporting trouble with handwritting. Pt provided built up  for use on utensils/writting insturments. S/OT demonstrated use. Pt demonstrated Good undertanding of built up  with improved penmanship when tracing letters on sheet         Balance  Sitting Balance: Stand by assistance  Standing Balance: Minimal assistance (1-2 UE support )   Standing Balance  Time: ~1-2 minutes  Activity: transfer/self care  Comment: 1-2 UE support; grab bars  Functional Mobility  Functional - Mobility Device: Wheelchair  Activity: To/from bathroom  Assist Level: Minimal assistance  Functional Mobility Comments: Able to self propell to/from bathroom with increased time.  assistance required to position wheelchair in shower     Bed mobility  Rolling to Left: Minimal assistance  Rolling to Right: Minimal assistance  Supine to Sit: Minimal assistance  Sit to Supine: Minimal assistance  Scooting: Maximal assistance  Comment: Pt reports increased pain with scooting in ribs. Pain increases to 10/10 by pt report and observation of pt facial expression  Transfers  Stand Pivot Transfers: Minimal assistance  Sit to stand: Minimal assistance  Stand to sit: Minimal assistance  Transfer Comments: 1-2 UE support on grab bars; VC's for hand placement; L foot blocking due to slidding    Toilet Transfers  Toilet - Technique: Stand pivot  Equipment Used: Grab bars  Toilet Transfer: Moderate assistance  Toilet Transfers Comments: Per RN report     Shower Transfers  Shower - Transfer From: Wheelchair  Shower - Transfer Type: To and From  Shower - Transfer To: Shower seat with back  Shower - Technique: Stand pivot  Shower Transfers: Minimal assistance  Shower Transfers Comments: VC's for hand placement; blocking of L foot due to slidding        SPEECH:      Past Medical History:      Diagnosis Date    Allergic rhinitis, cause unspecified     Back pain     lumbar    Bowel obstruction (HCC)     history of due to scar tissue, resolved non-surgically    C. difficile diarrhea     CAD (coronary artery disease)     no stent needed per pt.  Dr. Jeramy Rizzo did cath at  2005    Cardiac murmur     Cellulitis     left leg    Cellulitis 2017 August    leg left leg/bug bite    Cerebral artery occlusion with cerebral infarction [facility])     TIA 2014    COVID-19     ONE YR AGO IN 4/25/2020 fever and cough    Diverticulosis of colon (without mention of hemorrhage)     GERD (gastroesophageal reflux disease)     GERD (gastroesophageal reflux disease)     on rx    History of blood transfusion     approx 2020        History of CHF (congestive heart failure)     History of MI (myocardial infarction) 2005    thought due to a blood clot    History of ovarian cyst 1970    had oopherectomy holly    History of peritonitis 1968    due to ruptured appendix age 13    HTN (hypertension)     Hx of blood clots     right leg    Hyperlipidemia     Intestinal or peritoneal adhesions with obstruction (postoperative) (postinfection) ([address]     Kidney infection     renal failure/sepsis/spider bite    Lateral epicondylitis  of elbow     MDRO (multiple drug resistant organisms) resistance     c diff    Muscle strain     right posterior shoulder    Other abnormal glucose     PONV (postoperative nausea and vomiting)     dry heaves    Pre-diabetes     Restless legs syndrome (RLS)     Snores     no cpap    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)     TIA (transient ischemic attack) 2014    Uses walker     Vitamin D deficiency     Wears glasses     Wellness examination     last seen 2 weeks ago       Past Surgical History:      Procedure Laterality Date    ABDOMEN SURGERY  1976    benign tumor removed near remaining ovary, 1.5 pounds    APPENDECTOMY  1968    appendix ruptured, developed peritonitis    BACK SURGERY      BUNIONECTOMY Left     along with calcium deposits removed   [address]  2005    negative    CERVICAL FUSION  05/21/2021    POSTERIOR C3-6 LAMINECTOMY, PARTIAL C7 LAMINECTOMY, FUSION C3-C6, SILVERCORD    CERVICAL FUSION N/A 5/21/2021    POSTERIOR C3-6 LAMINECTOMY, PARTIAL C7 LAMINECTOMY, FUSION C3-C6, SILVERCORD performed by Roselia Casas DO at [address]    12 INCHES REMOVED D/T OBSTRUCTION    COLONOSCOPY      CYST REMOVAL Right     right facial    HYSTERECTOMY  1973    taken as a result of recurring cysts    LUMBAR FUSION N/A 02/10/2020    LUMBAR L4-5 POSTERIOR  DECOMPRESSION INSTRUMENTATION FUSION WCEMENT AUGMENTATION/ performed by Crow Pretty MD at [place] [address] N/A 06/17/2020    L5-S1 PLIF L4-L5 REVISION performed by Crow Pretty MD at [facility]  08/14/2014    FESS    OVARY REMOVAL  1970    UNILATERAL due to cyst    OVARY REMOVAL  1971    partial, due to cyst    SINUS SURGERY  2004    UPPER GASTROINTESTINAL ENDOSCOPY N/A 05/31/2019    EGD ESOPHAGOGASTRODUODENOSCOPY performed by Pascale Felton MD at [facility] N/A 08/05/2019    EGD BIOPSY performed by Naren Hooker MD at [address] [facility] N/A 08/23/2019    EGD BIOPSY performed by Pascale Felton MD at [address] 03/05/2019    WRIST OPEN REDUCTION INTERNAL FIXATION performed by Anupam Stern MD at [address]       Allergies:    Bactrim [sulfamethoxazole-trimethoprim], Codeine, and Seasonal    Medications   Scheduled Meds:   lidocaine  1 patch TransDERmal Daily    busPIRone  5 mg Oral TID    amLODIPine  10 mg Oral Daily    apixaban  5 mg Oral BID    atorvastatin  40 mg Oral Nightly    calcium carbonate w/vitamin D  1 tablet Oral Daily    carvedilol  12.5 mg Oral BID    citalopram  20 mg Oral Daily    fenofibrate  160 mg Oral Daily    ferrous sulfate  325 mg Oral BID    furosemide  20 mg Oral BID    gabapentin  100 mg Oral BID    lisinopril  30 mg Oral Daily    pramipexole  0.5 mg Oral Nightly    cyanocobalamin  1,000 mcg Oral Daily    polyethylene glycol  17 g Oral Daily     Continuous Infusions:  PRN Meds:.melatonin, magnesium hydroxide, docusate sodium, acetaminophen, senna, bisacodyl     Social History:  Lives With: Spouse  Type of Home: House  Home Layout: One level,Laundry in basement  Home Access: Stairs to enter without rails  Entrance Stairs - Number of Steps: 1  Bathroom Shower/Tub: Tub/Shower unit,Curtain  Bathroom Toilet: Standard  Bathroom Equipment: Shower chair,Grab bars in shower,Hand-held shower,Toilet raiser,Grab bars around toilet  Bathroom Accessibility: Walker accessible  Home Equipment: Hospital bed,Rolling walker,4 wheeled walker,Cane,Lift chair  Receives Help From: Family  ADL Assistance: Needs assistance (daughter A with bathing 2x/week, IND dressing/toileting)  Homemaking Assistance: Needs assistance (daughter does laundry, pt helps with meals)  Homemaking Responsibilities: Yes ( grocery shops)  Ambulation Assistance: Independent (uses RW)  Transfer Assistance: Needs assistance (uses lift chair, A with stairs/transfers to shower)  Active : No  Patient's  Info: family  Occupation: Retired  Type of occupation: hairdresser  IADL Comments: sleeps in lift chair  Additional Comments:  is retired and assists prn - hx of CABG. Daughter works full time - assists with pt 2x/week on medications, laundry, etc. Lives close by. Social History     Socioeconomic History    Marital status:      Spouse name: Not on file    Number of children: Not on file    Years of education: Not on file    Highest education level: Not on file   Occupational History    Occupation: retired   Tobacco Use    Smoking status: Former Smoker     Packs/day: 0.50     Years: 20.00     Pack years: 10.00     Types: Cigarettes     Start date: 1995     Quit date: 2017     Years since quittin.7    Smokeless tobacco: Never Used   Vaping Use    Vaping Use: Never used   Substance and Sexual Activity    Alcohol use: No     Alcohol/week: 0.0 standard drinks    Drug use: No    Sexual activity: Not on file   Other Topics Concern    Not on file   Social History Narrative    Not on file     Social Determinants of Health     Financial Resource Strain: Low Risk     Difficulty of Paying Living Expenses: Not hard at all   Food Insecurity: No Food Insecurity    Worried About [address] in the Last Year: Never true    [address] in the Last Year: Never true   Transportation Needs:     Lack of Transportation (Medical): Not on file    Lack of Transportation (Non-Medical):  Not on file   Physical Activity:     Days of Exercise per Week: Not on file    Minutes of Exercise per Session: Not on file   Stress:     Feeling of Stress : Not on file   Social Connections:     Frequency of Communication with Friends and Family: Not on file    Frequency of Social Gatherings with Friends and Family: Not on file    Attends Rastafarian Services: Not on file    Active Member of Clubs or Organizations: Not on file    Attends Club or Organization Meetings: Not on file    Marital Status: Not on file   Intimate Partner Violence:     Fear of Current or Ex-Partner: Not on file    Emotionally Abused: Not on file    Physically Abused: Not on file    Sexually Abused: Not on file   Housing Stability:     Unable to Pay for Housing in the Last Year: Not on file    Number of Places Lived in the Last Year: Not on file    Unstable Housing in the Last Year: Not on file       Family History:       Problem Relation Age of Onset    Stroke Mother     Diabetes Mother     Heart Disease Mother     High Blood Pressure Mother     Heart Disease Father     Heart Disease Brother     High Blood Pressure Brother     Heart Disease Maternal Grandmother     High Blood Pressure Sister        Diagnostics:     CBC:   Recent Labs     03/23/22  0538 03/24/22  0636   WBC 6.8 6.5   RBC 3.85* 3.65*   HGB 12.0 11.4*   HCT 36.1 34.2*   MCV 93.8 93.9   RDW 14.0 14.1    347     BMP:    Recent Labs     03/23/22  0538 03/24/22  0636   GLUCOSE 152* 117*   BUN 51* 55*   CREATININE 1.22* 1.21*   CALCIUM 9.2 9.4    143   K 4.5 4.6    104   CO2 27 27   ANIONGAP 11 12   LABGLOM 43* 44*   GFRAA 53* 53*   GFR               HbA1c:   Lab Results   Component Value Date    LABA1C 5.1 02/01/2022     BNP: No results for input(s): BNP in the last 72 hours. PT/INR: No results for input(s): PROTIME, INR in the last 72 hours. APTT: No results for input(s): APTT in the last 72 hours. CARDIAC ENZYMES: No results for input(s): CKMB, CKMBINDEX, TROPONINT, TROPHS, TROPII in the last 72 hours.     Invalid input(s): CKTOTAL;3   FASTING LIPID PANEL:  Lab Results   Component Value Date    CHOL 103 05/20/2019    HDL 74 03/12/2021    TRIG 66 05/20/2019     LIVER PROFILE:   Recent Labs     03/23/22  0538   AST 16   ALT 13   BILITOT <0.15*   ALKPHOS 48          Radiology:    CT HEAD WO CONTRAST     Result Date: 3/17/2022  1. No acute intracranial abnormality. 2. Cerebral and cerebellar parenchymal volume loss with chronic microvascular white matter ischemic disease.      CT CHEST W CONTRAST     Result Date: 3/17/2022  1. There are old left-sided rib fractures. There is some areas of callus along the left 8th and 9th rib fractures which could be subacute or chronic. Correlate with signs of pain in this region. 2. No other acute traumatic injury involving the chest. 3. Atherosclerotic disease. 4. Fatty liver. 5. Gallstone without adjacent inflammatory changes. 6. Right adrenal nodule, likely related to an adenoma, unchanged dating back to 2018, benign.      CT CERVICAL SPINE WO CONTRAST     Result Date: 3/17/2022  No evidence of acute fracture in the cervical spine.      MRI LUMBAR SPINE WO CONTRAST     Result Date: 3/18/2022  At L4-L5, moderate bilateral neural foraminal narrowing and changes related to instrumented disc space fusion and posterior fusion. Bilateral laminectomy defects grade 1-2 anterolisthesis. Mild-to-moderate bilateral neural foraminal narrowing At L5-S1,  bilateral laminectomy defects, mild bilateral neural foraminal narrowing      XR CHEST PORTABLE     Result Date: 3/23/2022  Old left rib fractures noted. Examination is otherwise unremarkable. Physical Exam:  BP (!) 113/46   Pulse 71   Temp 98.2 °F (36.8 °C) (Oral)   Resp 18   Ht 5' 3\" (1.6 m)   SpO2 94%   BMI 31.71 kg/m²     GEN: Well developed, well nourished, in NAD  HEENT:  NCAT. PERRL. EOMI. Mucous membranes pink and moist.   PULM:  Clear to ausculation. No rales or rhonchi. Respirations WNL and unlabored. CV:  Regular rate rhythm. No murmurs or gallops.   GI: Abdomen soft. Nontender. Non-distended. BS + and equal.    NEUROLOGICAL: A&O x3. Sensation intact to light touch. DTRs 2+. MSK:  Functional ROM BUEs, weakness impairs AROM BLEs. Motor testing 4+/5 key muscles RUE and RLEs. 3+/5 key muscles LUE, 4-/5 key muscles LLE. Nicolle White SKIN: Warm dry and intact. Good turgor. EXTREMITIES:  No calf tenderness to palpation. No edema BLEs. PSYCH: Mood WNL. Appropriately interactive. Affect WNL. Principal Diagnosis/plan:  The patient is a 71y.o. year old with ADL and Mobility deficits secondary to Cervical myelopathy with residual L sided weakness    She will require close medical monitoring for the comorbidities listed below. She will benefit from intensive interdisciplinary therapies and rehab nursing care and is appropriate for inpatient rehabilitation. The post admission physician evaluation (SANDY) is consistent with the pre-admission assessment. See above findings to reflect the elements required in the SANDY. Patient's admitting condition is consistent with the findings of the preadmission assessment by the rehabilitation admissions coordinator. Diagnoses/plan:    1. Ataxia with L sided weakness:  PT/OT for gait, mobility, strengthening, endurance, ADLs, and self care. On Pramipexole  2. Cervical myelopathy: Hx C3-6 decompression. Has gabapentin and prn Tylenol for pain. 3. Chronic diastolic heart failure/HTN/CAD: on amlodipine, lipitor, carvedilol, fenofibrate, lisinopril, furosemide  4. Major depressive disorder: on celexa  5. Chronic DVT: on eliquis - monitor with history of falls  6. Anemia: Hb near normal. On ferrous sulfate. Will monitor  7. Lower extremity cellulitis: on Keflex   8. BIN: stable. Monitoring BMP  9. Insomnia: has melatonin prn  10. Bowel Management: Miralax daily, senokot prn, dulcolax prn. 11. DVT Prophylaxis:  SCD's while in bed, PRAVEEN's  and Eliquis  12.  Internal medicine for medical management      Estimated Length of Stay:  2 weeks.    Prognosis  fair    Goals    Home at Supervision  Supervision at Discharge: Berny Lopez MD     This note is created with the assistance of a speech recognition program.  While intending to generate a document that actually reflects the content of the visit, the document can still have some errors including those of syntax and sound a like substitutions which may escape proof reading. In such instances, actual meaning can be extrapolated by contextual diversion.

## 2022-03-24 NOTE — CARE COORDINATION
CASE MANAGEMENT NOTE:    Admission Date:  3/23/2022 Dakota Escamilla is a 71 y.o.  female    Admitted for : Cervical myelopathy ([facility] [G95.9]    Met with:  Patient    PCP:  Dr. Burgos [place]:  Primary: [employer]. Secondary: Ynes Pratt    Is patient alert and oriented at time of discussion:  Yes    Current Residence/ Living Arrangements:  Pt lives in single story home with 1 step to enter (no Rail); Living with Spouse at home, dependent on family care. Does patient have 24 hour assistance at home:  Yes    Current Services PTA:  Yes; [facility] [facility] [address] [place] [facility] nursing and PT/OT. Regular follow-ups with Dr. Mirna Bazan (last appt on March 4th, per pt)    Does patient go to outpatient dialysis: No  If yes, location and chair time: n/a    Is patient agreeable to VNS/Outpatient therapy Yes; Pt would like to continue with [facility]    [place] of choice provided:  Yes    List of Home Care Agencies/Outpatient therapy provided: No    VNS/Outpatient therapy chosen:  Yes; [address]     DME:  straight cane, RW, Rollator, [address] bed, Reacher, Long handled sponge, sock aid. Home Oxygen: No    Nebulizer: No    CPAP/BIPAP: No    Supplier: N/A    Handicap Placard: No    Potential Assistance Needed: Yes    Pharmacy:  [facility]       Does Patient want to use MEDS to BEDS? No    Is patient currently receiving oral anticoagulation therapy? Yes; Eliquis     Family Members/Caregivers that pt would like involved in their care:    Yes    If yes, list name here:  Spouse and Dtr     Transportation Provider:  Family             Discharge Plan:  Plan for pt to D/C home with family with continuation of [address] services; DME to be ordered as needed.            Electronically signed by: Elena Vargas PT on 3/24/2022 at 1:55 PM

## 2022-03-24 NOTE — CARE COORDINATION
Patient Health Questionnaire-9 (PHQ-9)    Over the last 2 weeks, how often have you been bothered by any of the following problems? 1. Little Interest or pleasure in doing things? [] Not at all  [x] Several Days  [] More than half the day  []  Nearly every day    2. Feeling down, depressed or hopeless? [] Not at all  [x] Several Days  [] More than half the day  []  Nearly every day    3. Trouble falling or staying asleep, or sleeping too much? [] Not at all  [x] Several Days  [] More than half the day  []  Nearly every day    4. Feeling tired or having little energy? [x] Not at all  [] Several Days  [] More than half the day  []  Nearly every day    5. Poor apettite or overeating? [x] Not at all  [] Several Days  [] More than half the day  []  Nearly every day    6. Feeling bad about yourself-or that you are a failure or have let yourself or your family down? [] Not at all  [] Several Days  [] More than half the day  [x]  Nearly every day    7. Trouble concentrating on things, such as reading the newspaper or watching television? [x] Not at all  [] Several Days  [] More than half the day  []  Nearly every day    8. Moving or speaking so slowly that other people could have noticed? Or the opposite-being so fidgety or restless that you have been moving around a lot more than usual?   [x] Not at all  [] Several Days  [] More than half the day  []  Nearly every day    9. Thoughts that you would be better off dead or of hurting yourself in some way? [x] Not at all  [] Several Days  [] More than half the day  []  Nearly every day    Total Score: 6  If you checked off any problems, how difficult have these problems made it for you to do your work, take care of things at home, or get along with other people?    [] Not difficult at all  [x] Somewhat Difficult  [] Very Difficult  []  Extremely Difficult      Pt becomes tearful during questions and discussion and expresses that she feels like a burden to her family; she feels that it is unfair to her  when he must provide so much care for her so often. She is fearful of ending up in a nursing home. Pt is concerned about the weight she's gained over the years; her increased weight makes it increasingly difficult for her  to provide physical assistance; especially when pt would have a fall at home. Pt currently is on Celexa but reports no benefits from it. Pt expresses willingness to participate in exercise, dietary modifications, or pharmaceutical means to facilitate weight loss if such an opportunity is available to her. Writer notifies Doctor Jamas Lennox.     Electronically signed by Jennifer Bermeo PT on 3/24/2022 at 2:37 PM

## 2022-03-25 PROCEDURE — 1180000000 HC REHAB R&B

## 2022-03-25 PROCEDURE — 6370000000 HC RX 637 (ALT 250 FOR IP): Performed by: PHYSICAL MEDICINE & REHABILITATION

## 2022-03-25 PROCEDURE — 97535 SELF CARE MNGMENT TRAINING: CPT

## 2022-03-25 PROCEDURE — 6370000000 HC RX 637 (ALT 250 FOR IP): Performed by: INTERNAL MEDICINE

## 2022-03-25 PROCEDURE — 97110 THERAPEUTIC EXERCISES: CPT

## 2022-03-25 PROCEDURE — 99232 SBSQ HOSP IP/OBS MODERATE 35: CPT | Performed by: PHYSICAL MEDICINE & REHABILITATION

## 2022-03-25 PROCEDURE — 97116 GAIT TRAINING THERAPY: CPT

## 2022-03-25 PROCEDURE — 99232 SBSQ HOSP IP/OBS MODERATE 35: CPT | Performed by: INTERNAL MEDICINE

## 2022-03-25 PROCEDURE — 97542 WHEELCHAIR MNGMENT TRAINING: CPT

## 2022-03-25 PROCEDURE — 97530 THERAPEUTIC ACTIVITIES: CPT

## 2022-03-25 PROCEDURE — 2500000003 HC RX 250 WO HCPCS: Performed by: PHYSICAL MEDICINE & REHABILITATION

## 2022-03-25 PROCEDURE — 6370000000 HC RX 637 (ALT 250 FOR IP): Performed by: NURSE PRACTITIONER

## 2022-03-25 RX ORDER — GABAPENTIN 100 MG/1
200 CAPSULE ORAL 2 TIMES DAILY
Status: DISCONTINUED | OUTPATIENT
Start: 2022-03-25 | End: 2022-04-12 | Stop reason: HOSPADM

## 2022-03-25 RX ORDER — TRAMADOL HYDROCHLORIDE 50 MG/1
50 TABLET ORAL EVERY 6 HOURS PRN
Status: DISCONTINUED | OUTPATIENT
Start: 2022-03-25 | End: 2022-04-12 | Stop reason: HOSPADM

## 2022-03-25 RX ORDER — GUAIFENESIN 100 MG/5ML
100 SYRUP ORAL DAILY PRN
Status: DISCONTINUED | OUTPATIENT
Start: 2022-03-25 | End: 2022-03-27

## 2022-03-25 RX ADMIN — GUAIFENESIN 100 MG: 200 SOLUTION ORAL at 22:05

## 2022-03-25 RX ADMIN — FUROSEMIDE 20 MG: 20 TABLET ORAL at 08:29

## 2022-03-25 RX ADMIN — CARVEDILOL 12.5 MG: 12.5 TABLET, FILM COATED ORAL at 08:29

## 2022-03-25 RX ADMIN — CYANOCOBALAMIN TAB 1000 MCG 1000 MCG: 1000 TAB at 08:29

## 2022-03-25 RX ADMIN — FERROUS SULFATE TAB 325 MG (65 MG ELEMENTAL FE) 325 MG: 325 (65 FE) TAB at 08:29

## 2022-03-25 RX ADMIN — CITALOPRAM HYDROBROMIDE 20 MG: 20 TABLET ORAL at 08:29

## 2022-03-25 RX ADMIN — FERROUS SULFATE TAB 325 MG (65 MG ELEMENTAL FE) 325 MG: 325 (65 FE) TAB at 20:43

## 2022-03-25 RX ADMIN — PRAMIPEXOLE DIHYDROCHLORIDE 0.5 MG: 0.25 TABLET ORAL at 20:43

## 2022-03-25 RX ADMIN — Medication 6 MG: at 20:43

## 2022-03-25 RX ADMIN — GABAPENTIN 100 MG: 100 CAPSULE ORAL at 08:29

## 2022-03-25 RX ADMIN — FUROSEMIDE 20 MG: 20 TABLET ORAL at 18:17

## 2022-03-25 RX ADMIN — TRAMADOL HYDROCHLORIDE 50 MG: 50 TABLET, COATED ORAL at 15:13

## 2022-03-25 RX ADMIN — LISINOPRIL 30 MG: 20 TABLET ORAL at 08:29

## 2022-03-25 RX ADMIN — ATORVASTATIN CALCIUM 40 MG: 40 TABLET, FILM COATED ORAL at 20:43

## 2022-03-25 RX ADMIN — ACETAMINOPHEN 650 MG: 325 TABLET ORAL at 05:08

## 2022-03-25 RX ADMIN — APIXABAN 5 MG: 5 TABLET, FILM COATED ORAL at 20:43

## 2022-03-25 RX ADMIN — FENOFIBRATE 160 MG: 160 TABLET ORAL at 08:29

## 2022-03-25 RX ADMIN — BUSPIRONE HYDROCHLORIDE 5 MG: 5 TABLET ORAL at 15:16

## 2022-03-25 RX ADMIN — AMLODIPINE BESYLATE 10 MG: 10 TABLET ORAL at 08:29

## 2022-03-25 RX ADMIN — GABAPENTIN 200 MG: 100 CAPSULE ORAL at 20:43

## 2022-03-25 RX ADMIN — CARVEDILOL 12.5 MG: 12.5 TABLET, FILM COATED ORAL at 20:43

## 2022-03-25 RX ADMIN — BUSPIRONE HYDROCHLORIDE 5 MG: 5 TABLET ORAL at 08:30

## 2022-03-25 RX ADMIN — TRAMADOL HYDROCHLORIDE 50 MG: 50 TABLET, COATED ORAL at 22:05

## 2022-03-25 RX ADMIN — CALCIUM CARBONATE 600 MG (1,500 MG)-VITAMIN D3 400 UNIT TABLET 1 TABLET: at 08:30

## 2022-03-25 RX ADMIN — BUSPIRONE HYDROCHLORIDE 5 MG: 5 TABLET ORAL at 20:45

## 2022-03-25 RX ADMIN — POLYETHYLENE GLYCOL 3350 17 G: 17 POWDER, FOR SOLUTION ORAL at 08:28

## 2022-03-25 RX ADMIN — APIXABAN 5 MG: 5 TABLET, FILM COATED ORAL at 08:29

## 2022-03-25 ASSESSMENT — PAIN DESCRIPTION - PAIN TYPE
TYPE: ACUTE PAIN

## 2022-03-25 ASSESSMENT — PAIN SCALES - GENERAL
PAINLEVEL_OUTOF10: 8

## 2022-03-25 ASSESSMENT — PAIN DESCRIPTION - LOCATION
LOCATION: CHEST

## 2022-03-25 ASSESSMENT — PAIN DESCRIPTION - FREQUENCY: FREQUENCY: INTERMITTENT

## 2022-03-25 ASSESSMENT — PAIN DESCRIPTION - ORIENTATION
ORIENTATION: MID

## 2022-03-25 ASSESSMENT — 9 HOLE PEG TEST
TEST_RESULT: IMPAIRED
TEST_RESULT: IMPAIRED
TESTTIME_SECONDS: 33
TESTTIME_SECONDS: 60

## 2022-03-25 ASSESSMENT — PAIN DESCRIPTION - PROGRESSION: CLINICAL_PROGRESSION: NOT CHANGED

## 2022-03-25 ASSESSMENT — PAIN DESCRIPTION - DESCRIPTORS: DESCRIPTORS: SHARP

## 2022-03-25 NOTE — PLAN OF CARE
Problem: Skin Integrity:  Goal: Will show no infection signs and symptoms  Description: Will show no infection signs and symptoms  Outcome: Ongoing  Goal: Absence of new skin breakdown  Description: Absence of new skin breakdown  3/25/2022 1052 by Antonina Mortimer, RN  Outcome: Ongoing  3/25/2022 0523 by Carla Perales RN  Outcome: Met This Shift     Problem: Falls - Risk of:  Goal: Will remain free from falls  Description: Will remain free from falls  3/25/2022 1052 by Antonina Mortimer, RN  Outcome: Ongoing  3/25/2022 0523 by Carla Perales RN  Outcome: Met This Shift  Goal: Absence of physical injury  Description: Absence of physical injury  Outcome: Ongoing     Problem: Pain:  Goal: Pain level will decrease  Description: Pain level will decrease  3/25/2022 1052 by Antonina Mortimer, RN  Outcome: Ongoing  3/25/2022 0523 by Carla Perales RN  Outcome: Ongoing  Note: Pain assessment completed. Pt. able to rest.  Patient medicated with Tylenol 650 mg for pain this shift as ordered. Patient relates a tolerable level of discomfort with the current medication. Pt. Repositions per self for comfort. Nonverbal cues indicate pain relief. Pt. Rests quietly with eyes closed after pain medication administration. Respirations easy and unlabored. Appears free from distress.     Goal: Control of acute pain  Description: Control of acute pain  Outcome: Ongoing  Goal: Control of chronic pain  Description: Control of chronic pain  Outcome: Ongoing     Problem: Mobility - Impaired:  Goal: Mobility will improve  Description: Mobility will improve  Outcome: Ongoing     Problem: Musculor/Skeletal Functional Status  Goal: Highest potential functional level  3/25/2022 1052 by Antonina Mortimer, RN  Outcome: Ongoing  3/25/2022 0523 by Carla Perlaes RN  Outcome: Ongoing  Goal: Absence of falls  3/25/2022 1052 by Antonina Mortimer, RN  Outcome: Ongoing  3/25/2022 0523 by Carla Perales RN  Outcome: Ongoing     Problem: Musculor/Skeletal Functional Status  Goal: Highest potential functional level  Outcome: Ongoing  Goal: Absence of falls  Outcome: Ongoing     Problem: Nutrition  Goal: Optimal nutrition therapy  Outcome: Ongoing

## 2022-03-25 NOTE — PLAN OF CARE
Problem: Skin Integrity:  Goal: Absence of new skin breakdown  Description: Absence of new skin breakdown  Outcome: Met This Shift     Problem: Falls - Risk of:  Goal: Will remain free from falls  Description: Will remain free from falls  Outcome: Met This Shift     Problem: Pain:  Goal: Pain level will decrease  Description: Pain level will decrease  Outcome: Ongoing  Note: Pain assessment completed. Pt. able to rest.  Patient medicated with Tylenol 650 mg for pain this shift as ordered. Patient relates a tolerable level of discomfort with the current medication. Pt. Repositions per self for comfort. Nonverbal cues indicate pain relief. Pt. Rests quietly with eyes closed after pain medication administration. Respirations easy and unlabored. Appears free from distress.        Problem: Musculor/Skeletal Functional Status  Goal: Highest potential functional level  Outcome: Ongoing     Problem: Musculor/Skeletal Functional Status  Goal: Absence of falls  Outcome: Ongoing

## 2022-03-25 NOTE — PROGRESS NOTES
Physical Medicine & Rehabilitation  Progress Note      Subjective:      71year-old female with ataxia and cervical myelopathy. Patient is having problems with pain in the sternum, requiring pain medications. Lidoderm patch and Tylenol are not providing any significant relief of persistent aching pain present since her fall with CXR negative for fracture. No new issues with sleep, appetite, bowel, or bladder. She reports some itching when she took Percocet in the past but is willing to try tramadol. ROS:  Denies fevers, chills, sweats. No chest pain, palpitations, lightheadedness. Denies coughing, wheezing or shortness of breath. Denies abdominal pain, nausea, diarrhea or constipation. No new areas of joint pain. Denies new areas of numbness or weakness. Denies new anxiety or depression issues. No new skin problems. Rehabilitation:   Progressing in therapies. PT:  Restrictions/Precautions: General Precautions,Fall Risk  Implants present? : Metal implants (cervical and lumbar surgeries, L wrist ORIF)  Other position/activity restrictions: up as tolerated   Transfers  Sit to Stand: Moderate Assistance (Difficulty pushing off with her UE during sit<>stand)  Stand to sit: Minimal Assistance (Difficulty flexing L LE during stand>sit, hold L LE off the )  Bed to Chair: Minimal assistance,Moderate assistance (fluctuates min/mod A)  Stand Pivot Transfers: Contact guard assistance  Comment: mod A from bed, min A from chair, RW used for transfers. Ambulation 1  Surface: level tile  Device: Rolling Walker  Assistance: Minimal assistance  Quality of Gait: slow tawanda with step to pattern, flat foot LE at contact, decreased stance on L LE; downward gaze and cervical flexion  Gait Deviations: Decreased step length,Decreased step height,Shuffles,Slow Tawanda  Distance: 43 feet  Comments: Pt is min A for safety due to compensation of  LLE and poor endurance.  Pt has decreased step length and height with intermittent shuffling gait. Pt needs increase time for amb due to slow tawanda. Transfers  Sit to Stand: Moderate Assistance (Difficulty pushing off with her UE during sit<>stand)  Stand to sit: Minimal Assistance (Difficulty flexing L LE during stand>sit, hold L LE off the )  Bed to Chair: Minimal assistance,Moderate assistance (fluctuates min/mod A)  Stand Pivot Transfers: Contact guard assistance  Comment: mod A from bed, min A from chair, RW used for transfers. Ambulation  Ambulation?: Yes  Ambulation 1  Surface: level tile  Device: Rolling Walker  Assistance: Minimal assistance  Quality of Gait: slow tawanda with step to pattern, flat foot LE at contact, decreased stance on L LE; downward gaze and cervical flexion  Gait Deviations: Decreased step length,Decreased step height,Shuffles,Slow Tawanda  Distance: 43 feet  Comments: Pt is min A for safety due to compensation of  LLE and poor endurance. Pt has decreased step length and height with intermittent shuffling gait. Pt needs increase time for amb due to slow tawanda. Surface: level tile  Ambulation 1  Surface: level tile  Device: Rolling Walker  Assistance: Minimal assistance  Quality of Gait: slow tawanda with step to pattern, flat foot LE at contact, decreased stance on L LE; downward gaze and cervical flexion  Gait Deviations: Decreased step length,Decreased step height,Shuffles,Slow Tawanda  Distance: 43 feet  Comments: Pt is min A for safety due to compensation of  LLE and poor endurance. Pt has decreased step length and height with intermittent shuffling gait. Pt needs increase time for amb due to slow tawadna.     OT:  ADL  Equipment Provided: Sock aid,Long-handled shoe horn,Long-handled sponge,Other (comment) (dysem )  Feeding: Setup,Increased time to complete (pt reports diffulty keeping food on utensils )  Grooming: Setup  UE Bathing: Stand by assistance  LE Bathing: Stand by assistance,Increased time to complete (weight shifting for personal hygiene; difficult to reach BLE)  UE Dressing: Supervision (seated in wheelchair )  LE Dressing: Contact guard assistance (cueinng for use of AE; CGA to stand )  Toileting: Contact guard assistance (to stand )  Additional Comments: Pt able to self propell to/from bathroom SBA  with increased time. Pt placed in wheelchair onto shower ramp and transfered wheelchair<>shower chair Min A with VC's for hand placement and blocking of L foot due to slipping. No LOB noted, however pt reporting increased anxiety and fear of falling with transfers/functional mobility. Pt completed UB/LB bathing SBA while seated on shower seat, utilizing weight shifting method to complete personal hygiene and increased time/effort required to was distal BLE. Pt reports use of long handled sponge at home. Pt completed UB dressing SBA while seated in shower. Mod A for LB dressing to don/doff socks, thread LLE and standing Min A to pull up LB clothing. Pt completed grooming witth Set up assitance while seated at sink. No LOB throughout AM session. Upon measuring for PRAVEEN hose, pt's LLE observed to be ~1in shorter than RLE. PT Stephanie notified. PM: Pt required Min A bed<> recliner transfer. pt pain increases to 10/10 when attempting to scoot back in recliner. 2 pillows placed behind pt back for further support to decrease pain. Pt resonded well. Pt reporting problems keeping food on utensils and increased time to eat meals due to difficulty gripping with L hand. Pt also reporting trouble with handwritting. Pt provided built up  for use on utensils/writting insturments. S/OT demonstrated use.  Pt demonstrated Good undertanding of built up  with improved penmanship when tracing letters on sheet         Balance  Sitting Balance: Supervision  Standing Balance: Contact guard assistance (1-2 UE support with RW)   Standing Balance  Time: ~1-2 minues  Activity: transfer/self care  Comment: 1-2 UE support; grab bars/RW  Functional Mobility  Functional - Mobility Device: Wheelchair  Activity: To/from bathroom  Assist Level: Stand by assistance  Functional Mobility Comments: Able to self propell to/from bathroom with increased time     Bed mobility  Rolling to Left: Minimal assistance  Rolling to Right: Minimal assistance  Supine to Sit: Moderate assistance  Sit to Supine: Minimal assistance  Scooting: Moderate assistance  Comment: Pt reporting 8/10 pain this AM with bed mobility   Transfers  Stand Pivot Transfers: Minimal assistance  Sit to stand: Moderate assistance (bed level; CGA wheelchair level)  Stand to sit: Minimal assistance  Transfer Comments: 1-2 UE support on grab bars; VC's for hand placement; L foot blocking due to slidding. Slidding improved with shoes on feet rather than  socks. Dysem trialed as well    Toilet Transfers  Toilet - Technique: Stand pivot  Equipment Used: Grab bars  Toilet Transfer: Minimal assistance  Toilet Transfers Comments: VC's for hand placement/technique      Shower Transfers  Shower - Transfer From: Wheelchair  Shower - Transfer Type: To and From  Shower - Transfer To: Shower seat with back  Shower - Technique: Stand pivot  Shower Transfers: Minimal assistance  Shower Transfers Comments: VC's for hand placement; blocking of L foot due to slidding        SPEECH:      Objective:  /68   Pulse 56   Temp 97.9 °F (36.6 °C)   Resp 16   Ht 5' 3\" (1.6 m)   SpO2 95%   BMI 31.71 kg/m²       GEN: Well developed, well nourished, in NAD  HEENT:  NCAT. PERRL. EOMI. Mucous membranes pink and moist.   PULM:  Clear to ausculation. No rales or rhonchi. Respirations WNL and unlabored. CV:  Bradycardic rate regular rhythm. No murmurs or gallops. GI:  Abdomen soft. Nontender. Non-distended. BS + and equal.    NEUROLOGICAL: A&O x3. Sensation intact to light touch. MSK:  Functional ROM BUEs, weakness impairs AROM BLEs. Motor testing 4+/5 key muscles RUE and RLEs. 3+/5 key muscles LUE, 4-/5 key muscles LLE. Estella Holm     SKIN: Warm dry and intact. Good turgor. EXTREMITIES:  No calf tenderness to palpation. No edema BLEs. PSYCH: Mood WNL. Appropriately interactive. Affect WNL. Diagnostics:     CBC: Recent Labs     03/23/22  0538 03/24/22  0636   WBC 6.8 6.5   RBC 3.85* 3.65*   HGB 12.0 11.4*   HCT 36.1 34.2*   MCV 93.8 93.9   RDW 14.0 14.1    347     BMP:   Recent Labs     03/23/22  0538 03/24/22  0636    143   K 4.5 4.6    104   CO2 27 27   BUN 51* 55*   CREATININE 1.22* 1.21*   GLUCOSE 152* 117*     BNP: No results for input(s): BNP in the last 72 hours. PT/INR: No results for input(s): PROTIME, INR in the last 72 hours. APTT: No results for input(s): APTT in the last 72 hours. CARDIAC ENZYMES: No results for input(s): CKMB, CKMBINDEX, TROPONINT in the last 72 hours.     Invalid input(s): CKTOTAL;3 troponins   FASTING LIPID PANEL:  Lab Results   Component Value Date    CHOL 103 05/20/2019    HDL 74 03/12/2021    TRIG 66 05/20/2019     LIVER PROFILE:   Recent Labs     03/23/22  0538   AST 16   ALT 13   BILITOT <0.15*   ALKPHOS 48        Current Medications:   Current Facility-Administered Medications: melatonin tablet 6 mg, 6 mg, Oral, Nightly PRN  lidocaine 4 % external patch 1 patch, 1 patch, TransDERmal, Daily  busPIRone (BUSPAR) tablet 5 mg, 5 mg, Oral, TID  magnesium hydroxide (MILK OF MAGNESIA) 400 MG/5ML suspension 30 mL, 30 mL, Oral, Daily PRN  amLODIPine (NORVASC) tablet 10 mg, 10 mg, Oral, Daily  apixaban (ELIQUIS) tablet 5 mg, 5 mg, Oral, BID  atorvastatin (LIPITOR) tablet 40 mg, 40 mg, Oral, Nightly  calcium carbonate w/vitamin D (CALTRATE) 600-400 MG-UNIT per tab 1 tablet, 1 tablet, Oral, Daily  carvedilol (COREG) tablet 12.5 mg, 12.5 mg, Oral, BID  citalopram (CELEXA) tablet 20 mg, 20 mg, Oral, Daily  docusate sodium (COLACE) capsule 100 mg, 100 mg, Oral, BID PRN  fenofibrate (TRIGLIDE) tablet 160 mg, 160 mg, Oral, Daily  ferrous sulfate (IRON 325) tablet 325 mg, 325 mg, Oral, BID  furosemide (LASIX) tablet 20 mg, 20 mg, Oral, BID  gabapentin (NEURONTIN) capsule 100 mg, 100 mg, Oral, BID  lisinopril (PRINIVIL;ZESTRIL) tablet 30 mg, 30 mg, Oral, Daily  pramipexole (MIRAPEX) tablet 0.5 mg, 0.5 mg, Oral, Nightly  vitamin B-12 (CYANOCOBALAMIN) tablet 1,000 mcg, 1,000 mcg, Oral, Daily  acetaminophen (TYLENOL) tablet 650 mg, 650 mg, Oral, Q4H PRN  polyethylene glycol (GLYCOLAX) packet 17 g, 17 g, Oral, Daily  senna (SENOKOT) tablet 17.2 mg, 2 tablet, Oral, Daily PRN  bisacodyl (DULCOLAX) suppository 10 mg, 10 mg, Rectal, Daily PRN      Impression/Plan:   Impaired ADLs, gait, and mobility due to:      1. Ataxia with L sided weakness:  PT/OT for gait, mobility, strengthening, endurance, ADLs, and self care. On Pramipexole  2. Cervical myelopathy: Hx C3-6 decompression. Has gabapentin and prn Tylenol for pain. 3. Chronic diastolic heart failure/HTN/CAD: on amlodipine, lipitor, carvedilol, fenofibrate, lisinopril, furosemide  4. Major depressive disorder: on celexa. Monitor for now - may need to adjust medication  5. Chronic DVT: on eliquis - monitor with history of falls  6. Anemia: Hb near normal. On ferrous sulfate. Will monitor  7. Lower extremity cellulitis: completed Keflex   8. BIN: stable. Monitoring BMP  9. Insomnia: has melatonin prn  10. Bowel Management: Miralax daily, senokot prn, dulcolax prn. 11. DVT Prophylaxis:  SCD's while in bed, PRAVEEN's  and Eliquis  12. Internal medicine for medical management      Electronically signed by Marcin Ignacio MD on 3/25/2022 at 11:06 AM      This note is created with the assistance of a speech recognition program.  While intending to generate a document that actually reflects the content of the visit, the document can still have some errors including those of syntax and sound a like substitutions which may escape proof reading. In such instances, actual meaning can be extrapolated by contextual diversion.

## 2022-03-25 NOTE — PLAN OF CARE
Individualized Plan of Care  [facility] Inpatient Rehabilitation Unit    Rehabilitation physician: Dr Cosme Fabry Date: 3/23/2022     Rehabilitation Diagnosis: Cervical myelopathy ([facility] [address] [G95.9]     Rehabilitation impairments: self care, mobility, motor dysfunction and pain management    Factors facilitating achievement of predicted outcomes: Family support, Motivated, Cooperative, Pleasant, Good insight into deficits, Has needed Durable Medical Equipment at home, Knowledge about rehab and Has homemaker services  Barriers to the achievement of predicted outcomes: Pain, Depression, Decreased endurance, Lower extremity weakness and Medication managment    Patient Goals: Improve independence with mobility, Increase overall strength and endurance, Increase balance, Increase independence with activities of daily living, Increase safety awareness, Integrate appropriate pain management plan, Assure adequate nutritional option for discharge and Provide appropriate patient and family education      NURSING:  Nursing goals for Marino Dang while on the rehabilitation unit will include:  Adequate number of bowel movements, Urinate with no urinary retention >300ml in bladder, Maintain O2 SATs at an acceptable level during stay, Effective pain management while on the rehabilitation unit, Establish adequate pain control plan for discharge, Absence of skin breakdown while on the rehabilitation unit, Avoidance of any hospital acquired infections, Freedom from injury during hospitalization and Complete education with patient/family with understanding demonstrated regarding disease process and resultant impairment     In order to achieve these goals, nursing interventions may include bowel/bladder training, education for medical assistive devices, medication education, O2 saturation management, energy conservation, stress management techniques, fall prevention, alarms protocol, seating and positioning, skin/wound care, pressure relief instruction, dressing changes, infection protection, DVT prophylaxis, assistance with safe transfers , and/or assistance with bathroom activities and hygiene. PHYSICAL THERAPY:  Goals:        Short term goals  Time Frame for Short term goals: 1 week  Short term goal 1: Pt to demostrate bed mobility CGA/Jennifer. Short term goal 2: Pt to perform transfers CGA. Short term goal 3: Pt to amb 100'-150' with device, CGA. Short term goal 4: Pt to improve BLE strength by 1/2 MMG. Short term goal 5: Pt to improve standing balance to [facility]. Short term goal 6: Pt able to perform 4\" and 6\" steps x3 in //bars or acute stair way, with 2 B UE support, min A  Long term goals  Time Frame for Long term goals : By DC  Long term goal 1: Pt able to perform bed mobility at SBA  Long term goal 2: Pt able to transfer at supervsion level. Long term goal 3:  Pt able to ambulate house hold distances with assistive device mod-I  Long term goal 4:  Pt able to ambulate distance of 150 ft, level surfaces and shorter distance outside terraine at SBA. Long term goal 5: Pt able to propel w/c on level surface, distance of 150 ft, supervsion level. Long term goal 6: Improve 2MWT distance to atleast 120 ft to improve overall fucntion. Long term goal 7: Pt to improve Tinetti balance score to atlest 20/28 to reduce fall risk. Long term goal 8: Pt donny to perform cub step with B UE support and/or donny to goup and down 4 to 5 steps with 2 rails at min A     Plan of Care: Pt to be seen by physical therapy services 1 Hour 30 Minutes per day at least 5 out of 7 days per week     Anticipated interventions may include therapeutic exercises, gait training, neuromuscular re-ed, transfer training, community reintegration, bed mobility, w/c mobility and training.       OCCUPATIONAL THERAPY:  Goals:             Short term goals  Time Frame for Short term goals: 1 week   Short term goal 1: Pt will complete LB dressing/bathing/toileting CGA with Good safety with use of AE/DME/modified techniques for increased IND with self care  Short term goal 2: Pt will participate in 30+ minutes functional activity for increased strength/balance/endurance for increased IND in ADL's  Short term goal 3: Pt will complete transfers/functional mobility CGA with Good safety and RW for increased IND in ADL's  Short term goal 4: Pt will verbalize/demonstrate Good understanding of AE/DME/modified techniques for increased IND in self care  Short term goal 5: Pt will complete UB bathing/dressing/grooming with Supervision for increased IND in self care  Long term goals  Time Frame for Long term goals : by discharge   Long term goal 1: Pt will complete toileting/grooming/dressing Mod I and bathing with Supervison with Good safety with use of AE/DME/modified techniques for increased IND with self care  Long term goal 2: Pt will complete functional mobility/transferes during functional activity Mod I with Good safety and RW for increased IND in ADL's  Long term goal 3: Pt will verbalize/demonstrate Good understanding of fall prevention strategies for increased safety/IND in self care  Long term goal 4: Pt will improve L hand strength for self care as evidence by a 3# improvement in dynomometor testing   Long term goal 5: Pt will improve L FMC as evidence by a 20 second improvement on 9 hole peg test for increased IND with self care    Plan of Care: Patient to be seen by occupational therapy services 1 Hour 30 Minutes per day at least 5 out of 7 days per week     Anticipated interventions may include ADL and IADL retraining, strengthening, safety education and training, patient/caregiver education and training, equipment evaluation/ training/procurement, neuromuscular reeducation, wheelchair mobility training.       SPEECH THERAPY:   Goals:                      Plan of Care:     CASE MANAGEMENT:  Goals:   Assist patient/family with discharge planning, patient/family counseling,  and coordination with insurance during the inpatient rehabilitation stay. Other members of the multidisciplinary rehabilitation team that will be involved in the patient's plan of care include recreational therapy, dietary, respiratory therapy, and neuropsychology. Medical issues being managed closely and that require 24 hour availability of a physician:  Pain management, Infection protection, DVT prophylaxis, Fall precautions, Fluid/Electrolyte balance, Nutritional status, Respiratory needs, Anemia and History of heart disease                                           Physician anticipated functional outcomes: Improved independence with functional measures   Estimated length of stay for this admission 2 weeks  Medical Prognosis: Fair  Anticipated disposition: Home. The potential to achieve the above medical and rehabilitative goals is fair. This plan of care has been developed with the assistance and input of the multidisciplinary rehabilitation team.  The plan was reviewed with the patient on 3/25/2022. The patient has had the opportunity to provide input to the therapy team.    I have reviewed this Individualized Plan of Care and agree with its contents. Above documentation has been expanded, modified, adjusted to reflect the findings of my evaluations and goals for the patient.     Physician:  Electronically signed by Pedrito Rose MD on 3/25/22 at 11:08 AM EDT

## 2022-03-25 NOTE — PROGRESS NOTES
Physical Therapy  Facility/Department: [facility] ACUTE REHAB  Daily Treatment Note  NAME: Ines Nettles  : 1950  MRN: [id number]    Date of Service: 3/25/2022    Discharge Recommendations:  Patient would benefit from continued therapy after discharge   PT Equipment Recommendations  Other: Pt has lift chair, rolling walker, and a Rollator at home. Assessment   Body structures, Functions, Activity limitations: Decreased functional mobility ; Decreased ADL status; Decreased ROM; Decreased strength;Decreased endurance;Decreased balance;Decreased posture; Increased pain  Treatment Diagnosis: Impaired functional mobility 2* ataxia  Specific instructions for Next Treatment: transfers, amb, balance, standing program  REQUIRES PT FOLLOW UP: Yes  Activity Tolerance  Activity Tolerance: Patient limited by pain; Patient limited by fatigue;Patient limited by endurance     Patient Diagnosis(es): There were no encounter diagnoses.      has a past medical history of Allergic rhinitis, cause unspecified, Back pain, Bowel obstruction (HCC), C. difficile diarrhea, CAD (coronary artery disease), Cardiac murmur, Cellulitis, Cellulitis, Cerebral artery occlusion with cerebral infarction ([address] COVID-19, Diverticulosis of colon (without mention of hemorrhage), GERD (gastroesophageal reflux disease), GERD (gastroesophageal reflux disease), History of blood transfusion, History of CHF (congestive heart failure), History of MI (myocardial infarction), History of ovarian cyst, History of peritonitis, HTN (hypertension), Hx of blood clots, Hyperlipidemia, Intestinal or peritoneal adhesions with obstruction (postoperative) (postinfection) ([address] Kidney infection, Lateral epicondylitis  of elbow, MDRO (multiple drug resistant organisms) resistance, Muscle strain, Other abnormal glucose, PONV (postoperative nausea and vomiting), Pre-diabetes, Restless legs syndrome (RLS), Snores, 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[address] TIA (transient ischemic attack), Uses walker, Vitamin D deficiency, Wears glasses, and Wellness examination. has a past surgical history that includes Ovary removal (1970); colectomy (5380); Appendectomy (1968); Ovary removal (1971); Hysterectomy (1973); Bunionectomy (Left); sinus surgery (2004); Colonoscopy; other surgical history (08/14/2014); cyst removal (Right); Wrist fracture surgery (Left, 03/05/2019); Upper gastrointestinal endoscopy (N/A, 05/31/2019); Upper gastrointestinal endoscopy (N/A, 08/05/2019); Upper gastrointestinal endoscopy (N/A, 08/23/2019); Abdomen surgery (1976); lumbar fusion (N/A, 02/10/2020); Cardiac catheterization; Cardiac catheterization (2005); [place] tooth extraction; lumbar fusion (N/A, 06/17/2020); back surgery; cervical fusion (05/21/2021); and cervical fusion (N/A, 5/21/2021). Restrictions  Restrictions/Precautions  Restrictions/Precautions: General Precautions,Fall Risk  Required Braces or Orthoses?: No  Implants present? : Metal implants (cervical and lumbar surgeries, L wrist ORIF)  Position Activity Restriction  Other position/activity restrictions: up as tolerated  Subjective   General  Chart Reviewed: Yes  Additional Pertinent Hx: TIA  Response To Previous Treatment: Patient with no complaints from previous session. Family / Caregiver Present: No  Referring Practitioner: Dr Alyssa Garza. Subjective  Subjective: Patient reports sleeping well throughout the night, agreeable to therapy   Pain Screening  Patient Currently in Pain: Yes  Pain Assessment  Pain Assessment: 0-10  Pain Level: 8  Pain Type: Acute pain  Pain Location: Chest  Pain Orientation: Mid  Vital Signs  Patient Currently in Pain: Yes  Patient Observation  Observations: LLE measured to be shorter than RLE.  LLE slipping upon standing requiring blocking for safety        Orientation  Orientation  Overall Orientation Status: Within Normal Limits  Objective      Transfers  Sit to Stand: Minimal Assistance;Contact guard 150 ft, level surfaces and shorter distance outside terraine at SBA. Long term goal 5: Pt able to propel w/c on level surface, distance of 150 ft, supervsion level. Long term goal 6: Improve 2MWT distance to atleast 120 ft to improve overall fucntion. Long term goal 7: Pt to improve Tinetti balance score to atlest 20/28 to reduce fall risk. Long term goal 8: Pt donny to perform cub step with B UE support and/or donny to goup and down 4 to 5 steps with 2 rails at min A   Patient Goals   Patient goals : To get stronger    Plan    Plan  Times per week: 1.5 hr/day, 5 to 7 days/week. Specific instructions for Next Treatment: transfers, amb, balance, standing program  Current Treatment Recommendations: Strengthening,Balance Training,Functional Mobility Training,Transfer Training,Endurance Training,Gait Training,Equipment Evaluation, Education, & procurement,Patient/Caregiver Education & Training,Safety Education & Training,Stair training,Wheelchair Mobility Training  Safety Devices  Type of devices:  All fall risk precautions in place,Call light within reach,Gait belt,Bed alarm in place,Patient at risk for falls,Nurse notified        03/25/22 1112 03/25/22 1404   PT Individual Minutes   Time In 1104 1403   Time Out 1210 1418   Minutes 66 15   PT Concurrent Minutes   Time In 2138 4595   Time Out 1403 1436   Minutes 2 18     Electronically signed by Cheryle Dory, PTA on 3/25/22 at 3:27 PM EDT

## 2022-03-25 NOTE — CARE COORDINATION
Khoa Hyatt RN   Registered Nurse   Case Management   Progress Notes      Signed   Date of Service:  3/23/2022  2:49 PM         Related encounter: ED to Hosp-Admission (Discharged) from 3/16/2022 in [place] [address]  Acute Inpatient Rehab Preadmission Assessment     Patient Name: Danii Burgos        MRN:   [id number]    : 1950  (71 y.o.)  Gender: female      Admitted from:   [x]? [facility] [place] [place]  []? [address]   []? [address]   []? [facility]   []? Outside Admission - Location:                                 [x]? Initial         []? Updated     Date of Onset / Admission to the acute hospital:  3/16/22     Inpatient Rehabilitation Admitting Diagnosis:  Cervical Myelopathy        Did patient have surgery/procedures? [x]? No  []? Yes:       Physicians: Donnell Hamman for clinical complications: Moderate     Co-morbidities:       1. History of Decompression with residual left-sided weakness  2. Spondylolisthesis  3. Chronic DVT  4. Chronic diastolic heart failure  5. Hypertension  6. Coronary disease  7. Hyperlipidemia  8. Questional history of CVA  9. Major depressive disorder  10. History of cervical spine fusion 2021  11.  lumbar fusion 2020 and 2020  12. Anemia  13. Lower extremity cellulitis  14. Pain  15. Restless Leg Syndrome  16. Sternal pain  17. Acute kidney injury        Financial Information  Primary insurance:  []? Medicare     [x]? Medicare HMO      []? [employer]    []? Medicaid      []? Medicaid HMO       []? Workers Compensation        []? Personal Pay     Secondary Insurance:  []? Medicare     []? Medicare HMO      [x]? [employer]    []? Medicaid      []? Medicaid HMO        []? Workers Compensation      []? None     Precautions:   []? Cardiac Precautions:            []? Total hip precautions:           []? Weight Bearing status:  [x]? Safety Precautions/Concerns:  Fall Risk, General Precautions, Metal implants: cervical and lumbar surgeries, L wrist  [x]? Visually impaired:  Wears glasses for reading         []? Hard of Hearing:      Isolation Precautions:         []? Yes              [x]? No  If Yes:   []? Droplet  []? Contact           []? Airborne     []? VRE     []? MRSA        []? C-diff         []? TB             []? ESBL         []? MDRO          []? Other:                        Physiatrist:  [x]? Dr. Gin Leone     []? Dr. Monroe Billings  []? Dr. Mami Barrera  []? Dr. Navya Aguirre     Patients Occupation: Retired     Reviewed Lab and Diagnostic reports from Current Admission: Yes     Patients Prior Functional  Level: Prior Function  Receives Help From: Family  ADL Assistance: Needs assistance (daughter A with bathing 2x/week, IND dressing/toileting)  Homemaking Assistance: Needs assistance (daughter does laundry, pt helps with meals)  Ambulation Assistance: Independent (uses RW)  Transfer Assistance: Needs assistance ( lift chair, A with stairs/transfers to shower)  Additional Comments:  is retired and assists prn - hx of CABG. Daughter works full time - assists with pt 2x/week on medications, laundry, etc. Lives close by.      History of current illness, per PM&R Consult:  73-year-old female with history anemia, spondylolisthesis, chronic DVT, chronic diastolic heart failure, CVA, major depressive disorder, status post cervical spine fusion, stenosis cervical spine with myelopathy and diabetes type 2 who presented with leg pain and shortness of breath and fall as well as neck pain she is on multiple falls recently. Georgette Briones had bilateral lower lobe edema has known history of DVT is currently on Eliquis.     Internal medicine-awaiting placement, neurology consulted, MRI showed T2-3 and T5 7 moderate neural foraminal narrowing Lasix for swelling     Neurology-frequent falling episodes, cervical myelopathy status post decompression with residual left-sided weakness-recommended rehab     Neurosurgery last seen 3/4-ataxia left hand loss dexterity and falls new due to Parkinson's or other movement disorder had MRI done thoracic spine follow-up in 2 months     Prognosis: Fair     Current functional status for upper extremity ADLs:  UE Bathing: Stand by assistance  UE Dressing: Stand by assistance     Current functional status for lower extremity ADLs:  LE Bathing: Moderate assistance (reports assist B lower legs/feet)  LE Dressing: Moderate assistance     Current functional status for bed, chair, wheelchair transfers:  Transfers  Sit to Stand: Minimal Assistance  Stand to sit: Contact guard assistance  Bed to Chair: Minimal assistance  Stand Pivot Transfers: Contact guard assistance  Comment: Pt is Min A x1 for STS from chair in room. Pt heavily relys on UE and needs cueing to postion and use LE. Pt is CGA x1 for stand to sit and stand pivots. Pt needs verbal and tactile cueing for technique and hand placements.     Current functional status for toilet transfers: Toilet Transfers  Toilet - Technique: Ambulating  Equipment Used: Grab bars  Toilet Transfer: Minimal assistance  Toilet Transfers Comments: with RW     Current functional status for locomotion:  Ambulation  Ambulation?: Yes  Ambulation 1  Surface: level tile  Device: Rolling Walker  Assistance: Contact guard assistance  Quality of Gait: slow tawanda with step to pattern. Pt favors LLE and has lack of weight shifting, with downward gaze and cervical flexion. Gait Deviations: Decreased step length,Decreased step height,Shuffles,Slow Tawanda  Distance: 50ft x1  Comments: Pt is CGA x1 for safety due to compinsation of  LLE and poor endurance. Pt has decreased step length and height with intermittent shuffling gait. Pt needs increase time for amb due to slow tawanda.  Pt had good carryover with verbal cuing of up lifting gaze, and good carryover for tactile cue to shifting weight in LE.     Current functional status for comprehension: Complete independence     Current functional status for expression: Complete independence     Current functional status for social interaction: Complete independence     Current functional status for problem solving: Complete independence     Current functional status for memory: Complete independence     Current Deficits R/T Impairment: Impaired Functional Mobility and Decreased ADLs     Required Therapy:   [x]? Physical Therapy  [x]? Occupational Therapy   []? Speech Therapy, as appropriate     Additional Services:    [x]?     []? Recreational Therapy, as appropriate    [x]? Nutrition    []? Dialysis  []? Cultural Needs Identified  []? Special Equipment Needs  []? Other     Rehab Justification:  Needs 3 hrs therapy per day or 15 hours per week:  Yes  Identified Rehab Nursing needs: Yes  Intense Interdisciplinary need:  Yes  Need for 24 hr physician supervision:  Yes  Measurable improved quality of life:  Yes  Willingness to participate:  Yes  Medical Necessity:  Yes  Patient able to tolerate care proposed:   Yes     Expected Discharge Destination/Functional Level:  Home with assist  Expected length of time to achieve that level of improvement: 1-2 weeks  Expected Post Discharge Treatments: Home with possible Home Care     Other information relevant to patient's care needs:  N/A     Acute Inpatient Rehabilitation Disclosure Statement will be provided to patient upon admission to ARU with patient's verbalization of understanding.       I have reviewed and concur with the findings and results of the pre-admission screening assessment completed by the Inpatient Rehabilitation Admissions Coordinator.                  Cosigned by: Binta Traore MD at 3/23/2022  3:16 PM

## 2022-03-25 NOTE — PROGRESS NOTES
[address]   ACUTE REHABILITATION OCCUPATIONAL THERAPY  DAILY NOTE    Date: 3/25/22  Patient Name: Raphael Hernandez      Room: 8337/1127-92    MRN: [id number]   : 1950  (71 y.o.)  Gender: female   Referring Practitioner: Carie Conley MD  Diagnosis: Cervical myelopathy  Additional Pertinent Hx: 71 y.o.  female, with a history of anemia, spondylolisthesis, chronic DVT, chronic diastolic heart failure, CVA, major depressive disorder, s/p cervical spine fusion, stenosis of cervical spine with myelopathy, and DM type II, who presents with leg pain, shortness of breath, fall, and neck pain. According to patient and her , patient has had multiple falls recently including a fall Monday and evening and again on Tuesday. Patient reports that today she felt extremely weak upon waking. Restrictions  Restrictions/Precautions: General Precautions,Fall Risk  Implants present? : Metal implants (cervical and lumbar surgeries, L wrist ORIF)  Other position/activity restrictions: up as tolerated  Required Braces or Orthoses?: No  Equipment Used: Other (RW)    Subjective  Subjective: \"Can you ask my nurse if I can have some stronger pain medications? \" Pt reporting increased pain in ribs this AM  Comments: Pt pleasent and agreeable for OT treatment this AM  Patient Currently in Pain: Yes  Pain Level: 8  Pain Location: Chest  Pain Orientation: Mid  Restrictions/Precautions: General Precautions; Fall Risk  Overall Orientation Status: Within Functional Limits  Patient Observation  Observations: LLE measured to be shorter than RLE.  LLE slipping upon standing requiring blocking for safety   Pain Assessment  Pain Assessment: 0-10  Pain Level: 8  Pain Type: Acute pain  Pain Location: Chest  Pain Orientation: Mid  Pain Descriptors: Sharp  Pain Frequency: Intermittent (with activity)  Clinical Progression: Not changed    Objective  Cognition  Overall Cognitive Status: WFL  Perception  Overall Perceptual Status: WFL  Balance  Sitting Balance: Supervision  Standing Balance: Contact guard assistance (1-2 UE support with RW)  Bed mobility  Rolling to Right: Minimal assistance  Supine to Sit: Moderate assistance  Scooting: Moderate assistance  Comment: Pt reporting 8/10 pain this AM with bed mobility   Transfers  Stand Pivot Transfers: Minimal assistance  Sit to stand: Moderate assistance (bed level; CGA wheelchair level)  Stand to sit: Minimal assistance  Transfer Comments: 1-2 UE support on grab bars; VC's for hand placement; L foot blocking due to slidding. Slidding improved with shoes on feet rather than  socks. Dysem trialed as well   Standing Balance  Time: ~1-2 minues  Activity: transfer/self care  Comment: 1-2 UE support; grab bars/RW  Functional Mobility  Functional - Mobility Device: Wheelchair  Activity: To/from bathroom; To/From therapy gym  Assist Level: Stand by assistance  Functional Mobility Comments: Able to self propell to/from bathroom with increased time; Did not self propell to/from therapy gym   Toilet Transfers  Toilet - Technique: Stand pivot  Equipment Used: Grab bars  Toilet Transfer: Minimal assistance  Toilet Transfers Comments: VC's for hand placement/technique            Additional Activities: Other  Additional Activities: Pt engaged with S/OT in checkers/ card game/beading for promotion/improvement of [address] of L hand and bilateral coordination required for ADL's. Pt required cuing to use LUE during tasks at times                  ADL  Equipment Provided: Sock aid;Long-handled shoe horn;Long-handled sponge; Other (comment) (dysem )  Feeding: Setup; Increased time to complete (pt reports diffulty keeping food on utensils )  Grooming: Setup  UE Dressing: Supervision (seated in wheelchair )  LE Dressing: Contact guard assistance (cueinng for use of AE; CGA to stand )  Toileting: Contact guard assistance (to stand )  Additional Comments: Pt required Mod A sup<>sit and to stand from EOB with RW due to increased pain of 8/10 in chest. Pt required Mod A bed<>wheelchair with RW with L foot blocked. Shoes placed on pt prior to transfer and noted to prevent L foot slipping better than  socks from day prior. Pt transfered to/from toilet Min A with use of grab bars, L foot blocking and VC's for technique. Pt completed UB dressing with supervision in wheelchair. CGA to stand for LB dressing/toileting. Pt provided and educated on use of reacher for LB dressing. Pt demonstrated fair understanding with Fair follow through. Pt provided long handled sponge/shoe horn for future use PRN. Pt provided dysem under feet to trial use to prevent L foot slipping. Fair benifit. Will reattempt. Pt completed grooming with Set up while seated in wheelchair at sink. Pt taken to therapy gym and engaged in game of checkers at therapy table while seated in chair for promotion of [facility] of L hand. Pt requiring VC's to use L hand during activity. Dysem trialed under entirety of L foot in PM session upon sit<>stand transfer with no L foot blocking required. Assessment  Performance deficits / Impairments: Decreased functional mobility ; Decreased ADL status; Decreased strength;Decreased endurance;Decreased sensation;Decreased balance;Decreased high-level IADLs;Decreased fine motor control;Decreased coordination  Prognosis: Good  Discharge Recommendations: Patient would benefit from continued therapy after discharge  Activity Tolerance: Patient Tolerated treatment well  Safety Devices in place: Yes  Type of devices: Left in chair;Patient at risk for falls;Gait belt (In therapy gym)  Restraints  Initially in place: No          Patient Education:  Modified techniques, use of AE, safety   Patient Goals   Patient goals :  \"To be able to put my pants/socks on better\"  Learner:patient  Method: demonstration and explanation       Outcome: demonstrated understanding        Plan  Plan  Times per week: 5-7  Times per day: Twice a day  Current Treatment Recommendations: Balance Training,Functional Mobility Training,Endurance Training,Strengthening,ROM,Safety Education & Training,Patient/Caregiver Education & Training,Equipment Evaluation, Education, & procurement,Self-Care / ADL,Pain Management  Patient Goals   Patient goals :  \"To be able to put my pants/socks on better\"  Short term goals  Time Frame for Short term goals: 1 week   Short term goal 1: Pt will complete LB dressing/bathing/toileting CGA with Good safety with use of AE/DME/modified techniques for increased IND with self care  Short term goal 2: Pt will participate in 30+ minutes functional activity for increased strength/balance/endurance for increased IND in ADL's  Short term goal 3: Pt will complete transfers/functional mobility CGA with Good safety and RW for increased IND in ADL's  Short term goal 4: Pt will verbalize/demonstrate Good understanding of AE/DME/modified techniques for increased IND in self care  Short term goal 5: Pt will complete UB bathing/dressing/grooming with Supervision for increased IND in self care  Long term goals  Time Frame for Long term goals : by discharge   Long term goal 1: Pt will complete toileting/grooming/dressing Mod I and bathing with Supervison with Good safety with use of AE/DME/modified techniques for increased IND with self care  Long term goal 2: Pt will complete functional mobility/transferes during functional activity Mod I with Good safety and RW for increased IND in ADL's  Long term goal 3: Pt will verbalize/demonstrate Good understanding of fall prevention strategies for increased safety/IND in self care  Long term goal 4: Pt will improve L hand strength for self care as evidence by a 3# improvement in dynomometor testing   Long term goal 5: Pt will improve L FMC as evidence by a 20 second improvement on 9 hole peg test for increased IND with self care     03/25/22 0958 03/25/22 1332   OT Individual Minutes   Time In [id number] Time Out 1100 1400   Minutes 62 28   Time Code Minutes    Timed Code Treatment Minutes 62 Minutes 28 Minutes     Electronically signed by Michelle Bonds S/OT on 3/25/22 at 3:01 PM EDT

## 2022-03-26 PROCEDURE — 99232 SBSQ HOSP IP/OBS MODERATE 35: CPT | Performed by: PHYSICAL MEDICINE & REHABILITATION

## 2022-03-26 PROCEDURE — 6370000000 HC RX 637 (ALT 250 FOR IP): Performed by: INTERNAL MEDICINE

## 2022-03-26 PROCEDURE — 6370000000 HC RX 637 (ALT 250 FOR IP): Performed by: PHYSICAL MEDICINE & REHABILITATION

## 2022-03-26 PROCEDURE — 97542 WHEELCHAIR MNGMENT TRAINING: CPT

## 2022-03-26 PROCEDURE — 97530 THERAPEUTIC ACTIVITIES: CPT

## 2022-03-26 PROCEDURE — 99232 SBSQ HOSP IP/OBS MODERATE 35: CPT | Performed by: INTERNAL MEDICINE

## 2022-03-26 PROCEDURE — 97116 GAIT TRAINING THERAPY: CPT

## 2022-03-26 PROCEDURE — 6370000000 HC RX 637 (ALT 250 FOR IP): Performed by: NURSE PRACTITIONER

## 2022-03-26 PROCEDURE — 97110 THERAPEUTIC EXERCISES: CPT

## 2022-03-26 PROCEDURE — 97535 SELF CARE MNGMENT TRAINING: CPT

## 2022-03-26 PROCEDURE — 1180000000 HC REHAB R&B

## 2022-03-26 RX ADMIN — TRAMADOL HYDROCHLORIDE 50 MG: 50 TABLET, COATED ORAL at 20:50

## 2022-03-26 RX ADMIN — CYANOCOBALAMIN TAB 1000 MCG 1000 MCG: 1000 TAB at 08:22

## 2022-03-26 RX ADMIN — TRAMADOL HYDROCHLORIDE 50 MG: 50 TABLET, COATED ORAL at 08:21

## 2022-03-26 RX ADMIN — BUSPIRONE HYDROCHLORIDE 5 MG: 5 TABLET ORAL at 14:51

## 2022-03-26 RX ADMIN — FERROUS SULFATE TAB 325 MG (65 MG ELEMENTAL FE) 325 MG: 325 (65 FE) TAB at 08:22

## 2022-03-26 RX ADMIN — CITALOPRAM HYDROBROMIDE 20 MG: 20 TABLET ORAL at 08:22

## 2022-03-26 RX ADMIN — BUSPIRONE HYDROCHLORIDE 5 MG: 5 TABLET ORAL at 08:26

## 2022-03-26 RX ADMIN — TRAMADOL HYDROCHLORIDE 50 MG: 50 TABLET, COATED ORAL at 14:51

## 2022-03-26 RX ADMIN — AMLODIPINE BESYLATE 10 MG: 10 TABLET ORAL at 08:22

## 2022-03-26 RX ADMIN — GABAPENTIN 200 MG: 100 CAPSULE ORAL at 08:22

## 2022-03-26 RX ADMIN — FENOFIBRATE 160 MG: 160 TABLET ORAL at 08:22

## 2022-03-26 RX ADMIN — CARVEDILOL 12.5 MG: 12.5 TABLET, FILM COATED ORAL at 08:22

## 2022-03-26 RX ADMIN — BUSPIRONE HYDROCHLORIDE 5 MG: 5 TABLET ORAL at 20:30

## 2022-03-26 RX ADMIN — APIXABAN 5 MG: 5 TABLET, FILM COATED ORAL at 20:28

## 2022-03-26 RX ADMIN — LISINOPRIL 30 MG: 20 TABLET ORAL at 08:21

## 2022-03-26 RX ADMIN — GABAPENTIN 200 MG: 100 CAPSULE ORAL at 20:28

## 2022-03-26 RX ADMIN — APIXABAN 5 MG: 5 TABLET, FILM COATED ORAL at 08:24

## 2022-03-26 RX ADMIN — ATORVASTATIN CALCIUM 40 MG: 40 TABLET, FILM COATED ORAL at 20:28

## 2022-03-26 RX ADMIN — FERROUS SULFATE TAB 325 MG (65 MG ELEMENTAL FE) 325 MG: 325 (65 FE) TAB at 20:28

## 2022-03-26 RX ADMIN — FUROSEMIDE 20 MG: 20 TABLET ORAL at 14:52

## 2022-03-26 RX ADMIN — Medication 6 MG: at 20:50

## 2022-03-26 RX ADMIN — POLYETHYLENE GLYCOL 3350 17 G: 17 POWDER, FOR SOLUTION ORAL at 08:22

## 2022-03-26 RX ADMIN — CALCIUM CARBONATE 600 MG (1,500 MG)-VITAMIN D3 400 UNIT TABLET 1 TABLET: at 08:22

## 2022-03-26 RX ADMIN — PRAMIPEXOLE DIHYDROCHLORIDE 0.5 MG: 0.25 TABLET ORAL at 20:28

## 2022-03-26 RX ADMIN — FUROSEMIDE 20 MG: 20 TABLET ORAL at 08:22

## 2022-03-26 ASSESSMENT — PAIN DESCRIPTION - PROGRESSION: CLINICAL_PROGRESSION: GRADUALLY IMPROVING

## 2022-03-26 ASSESSMENT — PAIN DESCRIPTION - LOCATION
LOCATION: CHEST

## 2022-03-26 ASSESSMENT — PAIN DESCRIPTION - PAIN TYPE
TYPE: ACUTE PAIN

## 2022-03-26 ASSESSMENT — PAIN SCALES - GENERAL
PAINLEVEL_OUTOF10: 7
PAINLEVEL_OUTOF10: 5
PAINLEVEL_OUTOF10: 4
PAINLEVEL_OUTOF10: 7
PAINLEVEL_OUTOF10: 8
PAINLEVEL_OUTOF10: 7

## 2022-03-26 ASSESSMENT — PAIN DESCRIPTION - ORIENTATION
ORIENTATION: UPPER
ORIENTATION: UPPER
ORIENTATION: MID

## 2022-03-26 ASSESSMENT — PAIN DESCRIPTION - ONSET
ONSET: ON-GOING
ONSET: ON-GOING

## 2022-03-26 ASSESSMENT — PAIN DESCRIPTION - DESCRIPTORS
DESCRIPTORS: ACHING
DESCRIPTORS: ACHING

## 2022-03-26 ASSESSMENT — PAIN DESCRIPTION - FREQUENCY
FREQUENCY: INTERMITTENT
FREQUENCY: INTERMITTENT

## 2022-03-26 NOTE — PROGRESS NOTES
fever and cough    Diverticulosis of colon (without mention of hemorrhage)     GERD (gastroesophageal reflux disease)     GERD (gastroesophageal reflux disease)     on rx    History of blood transfusion     approx 2020        History of CHF (congestive heart failure)     History of MI (myocardial infarction) 2005    thought due to a blood clot    History of ovarian cyst 1970    had oopherectomy holly    History of peritonitis 1968    due to ruptured appendix age 13    HTN (hypertension)     Hx of blood clots     right leg    Hyperlipidemia     Intestinal or peritoneal adhesions with obstruction (postoperative) (postinfection) ([address]     Kidney infection     renal failure/sepsis/spider bite    Lateral epicondylitis  of elbow     MDRO (multiple drug resistant organisms) resistance     c diff    Muscle strain     right posterior shoulder    Other abnormal glucose     PONV (postoperative nausea and vomiting)     dry heaves    Pre-diabetes     Restless legs syndrome (RLS)     Snores     no cpap    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)     TIA (transient ischemic attack) 2014    Uses walker     Vitamin D deficiency     Wears glasses     Wellness examination     last seen 2 weeks ago        Past Surgical History:     Past Surgical History:   Procedure Laterality Date    ABDOMEN SURGERY  1976    benign tumor removed near remaining ovary, 1.5 pounds    APPENDECTOMY  1968    appendix ruptured, developed peritonitis    BACK SURGERY      BUNIONECTOMY Left     along with calcium deposits removed   [address]  2005    negative    CERVICAL FUSION  05/21/2021    POSTERIOR C3-6 LAMINECTOMY, PARTIAL C7 LAMINECTOMY, FUSION C3-C6, SILVERCORD    CERVICAL FUSION N/A 5/21/2021    POSTERIOR C3-6 LAMINECTOMY, PARTIAL C7 LAMINECTOMY, FUSION C3-C6, SILVERCORD performed by Rosana Zamudio DO at [address] [place] [address]    12 INCHES REMOVED D/T OBSTRUCTION  COLONOSCOPY      CYST REMOVAL Right     right facial    HYSTERECTOMY  1973    taken as a result of recurring cysts    LUMBAR FUSION N/A 02/10/2020    LUMBAR L4-5 POSTERIOR  DECOMPRESSION INSTRUMENTATION FUSION WCEMENT AUGMENTATION/ performed by Martha Torres MD at [place] [address] N/A 06/17/2020    L5-S1 PLIF L4-L5 REVISION performed by Martha Torres MD at [address]  08/14/2014    FESS    OVARY REMOVAL  1970    UNILATERAL due to cyst    OVARY REMOVAL  1971    partial, due to cyst    SINUS SURGERY  2004    UPPER GASTROINTESTINAL ENDOSCOPY N/A 05/31/2019    EGD ESOPHAGOGASTRODUODENOSCOPY performed by Pascale Felton MD at [facility] N/A 08/05/2019    EGD BIOPSY performed by Naren Hooker MD at [facility] N/A 08/23/2019    EGD BIOPSY performed by Pascale Felton MD at [address] 03/05/2019    WRIST OPEN REDUCTION INTERNAL FIXATION performed by Laura Estevez MD at [address]        Medications Prior to Admission:     Prior to Admission medications    Medication Sig Start Date End Date Taking?  Authorizing Provider   amLODIPine (NORVASC) 10 MG tablet Take 1 tablet by mouth daily 3/10/22   Guerda Crandall MD   furosemide (LASIX) 40 MG tablet Take 1 tablet by mouth 2 times daily 3/1/22   MAIK Garcia CNP   carvedilol (COREG) 12.5 MG tablet Take 1 tablet by mouth 2 times daily 2/23/22   MAIK Garcia CNP   apixaban (ELIQUIS) 5 MG TABS tablet Please take 2 tablets by mouth for the first week then take 1 tablet by mouth BID for acute DVT  Patient taking differently: Take 5 mg by mouth 2 times daily take 1 tablet by mouth BID for acute DVT 2/17/22   MAIK Garcia CNP   atorvastatin (LIPITOR) 80 MG tablet Take 0.5 tablets by mouth nightly 2/1/22   MAIK Garcia CNP   lisinopril (PRINIVIL;ZESTRIL) 30 MG tablet Take 1 tablet by mouth daily 1/21/22   Lida Venegas MD   fenofibrate micronized (LOFIBRA) 134 MG capsule Take 1 capsule by mouth every morning (before breakfast) 1/13/22   Lida Venegas MD   pramipexole (MIRAPEX) 0.5 MG tablet Take 1 tablet by mouth nightly 1/6/22   Lida Venegas MD   citalopram (CELEXA) 20 MG tablet Take 1 tablet by mouth daily 1/3/22   Rabia Recinos MD   nitroGLYCERIN (NITROSTAT) 0.4 MG SL tablet Place 1 tablet under the tongue every 5 minutes as needed for Chest pain 9/1/21   [facility] Institute[facility]ita Venegas MD   docusate sodium (COLACE) 100 MG capsule Take 1 capsule by mouth 2 times daily as needed for Constipation 5/30/21   Gui Marley DO   diphenhydrAMINE HCl (BENADRYL ALLERGY PO) Take 1 capsule by mouth daily Takes q HS    Historical Provider, MD   cyanocobalamin ([facility] VITAMIN B12) 1000 MCG tablet Take 1 tablet by mouth daily 12/3/20   Lida Venegas MD   ferrous sulfate (IRON 325) 325 (65 Fe) MG tablet Take 1 tablet by mouth 2 times daily 7/2/20   Sarika Aragon MD   VITAMIN D, ERGOCALCIFEROL, PO Take by mouth    Historical Provider, MD   Lancets MISC 1 each by Does not apply route daily 10/10/19   Tory Payne MD   blood glucose monitor strips Test 2 times a day & as needed for symptoms of irregular blood glucose. 10/10/19   Tory Payne MD   Calcium Carbonate-Vitamin D (CALCIUM 500/D) 500-125 MG-UNIT TABS Take 1 tablet by mouth daily     Historical Provider, MD        Allergies:     Bactrim [sulfamethoxazole-trimethoprim], Codeine, and Seasonal    Social History:     Tobacco:    reports that she quit smoking about 4 years ago. Her smoking use included cigarettes. She started smoking about 26 years ago. She has a 10.00 pack-year smoking history. She has never used smokeless tobacco.  Alcohol:      reports no history of alcohol use. Drug Use:  reports no history of drug use.     Family History:     Family History   Problem Relation Age of Onset    Stroke Mother     Diabetes Mother     Heart Disease Mother     High Blood Pressure Mother     Heart Disease Father     Heart Disease Brother     High Blood Pressure Brother     Heart Disease Maternal Grandmother     High Blood Pressure Sister        Review of Systems:     Positive and Negative as described in HPI. CONSTITUTIONAL:  negative for fevers, chills, sweats, fatigue, weight loss  HEENT:  negative for vision, hearing changes, runny nose, throat pain  RESPIRATORY:  negative for shortness of breath, cough, congestion, wheezing. CARDIOVASCULAR:  negative for chest pain, palpitations. GASTROINTESTINAL:  negative for nausea, vomiting, diarrhea, constipation, change in bowel habits, abdominal pain   GENITOURINARY:  negative for difficulty of urination, burning with urination, frequency   INTEGUMENT:  negative for rash, skin lesions, easy bruising   HEMATOLOGIC/LYMPHATIC:  negative for swelling/edema   ALLERGIC/IMMUNOLOGIC:  negative for urticaria , itching  ENDOCRINE:  negative increase in drinking, increase in urination, hot or cold intolerance  MUSCULOSKELETAL:  negative joint pains, muscle aches, swelling of joints  NEUROLOGICAL:  negative for headaches, dizziness, lightheadedness, numbness, pain, tingling extremities      Physical Exam:     BP (!) 130/55   Pulse 65   Temp 98.8 °F (37.1 °C)   Resp 16   Ht 5' 3\" (1.6 m)   SpO2 93%   BMI 31.71 kg/m²   Temp (24hrs), Av.4 °F (36.9 °C), Min:97.9 °F (36.6 °C), Max:98.8 °F (37.1 °C)    No results for input(s): POCGLU in the last 72 hours. No intake or output data in the 24 hours ending 22    General Appearance:  alert, well appearing, and in no acute distress  Head:  normocephalic, atraumatic.   Eye: no icterus, redness, pupils equal and reactive, extraocular eye movements intact, conjunctiva clear  Ear: normal external ear, no discharge, hearing intact  Nose:  no drainage noted  Mouth: mucous membranes moist  Neck: supple, no carotid bruits, thyroid not palpable  Lungs: Bilateral equal air entry, clear to ausculation, no wheezing, rales or rhonchi, normal effort  Cardiovascular: normal rate, regular rhythm, no murmur, gallop, rub. Abdomen: Soft, nontender, nondistended, normal bowel sounds, no hepatomegaly or splenomegaly  Neurologic: There are no new focal motor or sensory deficits, normal muscle tone and bulk, no abnormal sensation, normal speech, cranial nerves II through XII grossly intact  Skin: No gross lesions, rashes, bruising or bleeding on exposed skin area  Extremities:  peripheral pulses palpable, no pedal edema or calf pain with palpation  Psych: normal affect    Investigations:      Laboratory Testing:  No results found for this or any previous visit (from the past 24 hour(s)). Imaging/Diagonstics:  Recent data reviewed    Assessment :      Primary Problem  Cervical myelopathy [facility])    Active Hospital Problems    Diagnosis Date Noted    [facility] [G95.9] 03/17/2022       Plan:     Multiple falls, gait instability, multiple remote and healing rib fractures leading to chest pain-needs PT OT  Neural foraminal narrowing of thoracic spine-neurology as reviewed-recommend rehab and physical therapy  Hypertension-continue amlodipine, Coreg  Right leg DVT-continue Eliquis  Chronic diastolic CHF-currently compensated-continue Coreg, Lipitor, Lasix, lisinopril  Depression -patient noted to be very tearful today-is already on maximal dose of  Celexa, will add BuSpar    DVT prophylaxis-patient on Eliquis    Consultations:   IP CONSULT TO DIETITIAN  IP CONSULT TO SOCIAL WORK  IP CONSULT TO INTERNAL MEDICINE      Rebekah Wall MD  3/25/2022  9:06 PM    Copy sent to Dr. Rebekah Wall MD    Please note that this chart was generated using voice recognition Dragon dictation software. Although every effort was made to ensure the accuracy of this automated transcription, some errors in transcription may have occurred.

## 2022-03-26 NOTE — PLAN OF CARE
Problem: Skin Integrity:  Goal: Will show no infection signs and symptoms  Description: Will show no infection signs and symptoms  Outcome: Ongoing  Goal: Absence of new skin breakdown  Description: Absence of new skin breakdown  Outcome: Ongoing     Problem: Falls - Risk of:  Goal: Will remain free from falls  Description: Will remain free from falls  Outcome: Ongoing  Goal: Absence of physical injury  Description: Absence of physical injury  Outcome: Ongoing     Problem: Pain:  Goal: Pain level will decrease  Description: Pain level will decrease  Outcome: Ongoing  Goal: Control of acute pain  Description: Control of acute pain  Outcome: Ongoing  Goal: Control of chronic pain  Description: Control of chronic pain  Outcome: Ongoing     Problem: Mobility - Impaired:  Goal: Mobility will improve  Description: Mobility will improve  Outcome: Ongoing     Problem: Musculor/Skeletal Functional Status  Goal: Highest potential functional level  Outcome: Ongoing  Goal: Absence of falls  Outcome: Ongoing     Problem: Musculor/Skeletal Functional Status  Goal: Highest potential functional level  Outcome: Ongoing  Goal: Absence of falls  Outcome: Ongoing     Problem: Nutrition  Goal: Optimal nutrition therapy  Outcome: Ongoing

## 2022-03-26 NOTE — PROGRESS NOTES
[address]   ACUTE REHABILITATION OCCUPATIONAL THERAPY  DAILY NOTE    Date: 3/26/22  Patient Name: Burton Black      Room: 5448/0004-28    MRN: [id number]   : 1950  (71 y.o.)  Gender: female   Referring Practitioner: Umu Madrid MD  Diagnosis: Cervical myelopathy  Additional Pertinent Hx: 71 y.o.  female, with a history of anemia, spondylolisthesis, chronic DVT, chronic diastolic heart failure, CVA, major depressive disorder, s/p cervical spine fusion, stenosis of cervical spine with myelopathy, and DM type II, who presents with leg pain, shortness of breath, fall, and neck pain. According to patient and her , patient has had multiple falls recently including a fall Monday and evening and again on Tuesday. Patient reports that today she felt extremely weak upon waking. Restrictions  Restrictions/Precautions: General Precautions,Fall Risk  Implants present? : Metal implants (cervical and lumbar surgeries, L wrist ORIF)  Other position/activity restrictions: up as tolerated  Required Braces or Orthoses?: No  Equipment Used: Other (RW)    Subjective  Subjective: \"I'll just wash up today\"  Comments: Pt pleasent and agreeable for OT treatment this AM  Patient Currently in Pain: Yes (reports pain with movement)  Pain Level: 5  Pain Location: Chest  Restrictions/Precautions: General Precautions; Fall Risk  Overall Orientation Status: Within Functional Limits     Pain Assessment  Pain Assessment: 0-10  Pain Level: 5  Pain Type: Acute pain  Pain Location: Chest    Objective  Cognition  Overall Cognitive Status: WFL  Perception  Overall Perceptual Status: WFL  Balance  Sitting Balance: Supervision  Standing Balance: Minimal assistance (slight posterior lean noted. )  Transfers  Stand Pivot Transfers: Minimal assistance  Sit to stand: Moderate assistance (from recliner.  CGA w/c level)  Stand to sit: Minimal assistance  Transfer Comments: 1-2 UE support on grab bars; VC's for hand placement; L foot blocking due to sliding. Sliding improved with shoes on feet rather than  socks. Standing Balance  Time: 30-60 sec X4  Activity: functional transfers, ADLs  Comment: 1-2 UE support; grab bars/RW  Toilet Transfers  Toilet - Technique: Stand pivot  Equipment Used: Grab bars  Toilet Transfer: Minimal assistance  Toilet Transfers Comments: VC's for hand placement/technique   Fine Motor: Pt instruction in [facility] and instrinsic hand strengthening for ease and safety with ADLs. Pt completes graded resistive clothes pins and places golf tees in slots with increased time. Next pt provided with soft theraptty for varuous grasps and manpulations. Pt reports this is a good resistance for her as she has medium at home and it's too difficult. ADL  Grooming: Setup (seated at the sink. )  UE Bathing: None (Pt declines this AM)  LE Bathing: Minimal assistance (A washing buttocks standing at sink)  UE Dressing: Contact guard assistance (CGA due to increased chest pain with movement)  LE Dressing: Minimal assistance (CGA using reacher for threading, A pulling over hips)  Toileting: Minimal assistance; Increased time to complete (A wiping post BM. )  Additional Comments: QUINONES facilitated pt in ADLs at the sink this AM. Dycem placed at feet and shoes donned for stability with all transfers. QUINONES provided CGA and instruction for use of reacher to thread BLEs with fair carryover. Assessment  Performance deficits / Impairments: Decreased functional mobility ; Decreased ADL status; Decreased strength;Decreased endurance;Decreased sensation;Decreased balance;Decreased high-level IADLs;Decreased fine motor control;Decreased coordination  Prognosis: Good  Discharge Recommendations: Patient would benefit from continued therapy after discharge  Activity Tolerance: Patient Tolerated treatment well  Safety Devices in place: Yes  Type of devices: Left in chair;Patient at risk for falls;Gait belt  Restraints  Initially in place: No          Patient Education: OT POC, safety and sequencing with stand pivot transfers, modified techniques and safety with ADLs, compensatory techniques for functional reaching tasks. Patient Goals   Patient goals : \"To be able to put my pants/socks on better\"  Learner:patient  Method: explanation       Outcome: acknowledged understanding of         Plan  Plan  Times per week: 5-7  Times per day: Twice a day  Current Treatment Recommendations: Balance Training,Functional Mobility Training,Endurance Training,Strengthening,ROM,Safety Education & Training,Patient/Caregiver Education & Training,Equipment Evaluation, Education, & procurement,Self-Care / William [employer]  Patient Goals   Patient goals :  \"To be able to put my pants/socks on better\"  Short term goals  Time Frame for Short term goals: 1 week   Short term goal 1: Pt will complete LB dressing/bathing/toileting CGA with Good safety with use of AE/DME/modified techniques for increased IND with self care  Short term goal 2: Pt will participate in 30+ minutes functional activity for increased strength/balance/endurance for increased IND in ADL's  Short term goal 3: Pt will complete transfers/functional mobility CGA with Good safety and RW for increased IND in ADL's  Short term goal 4: Pt will verbalize/demonstrate Good understanding of AE/DME/modified techniques for increased IND in self care  Short term goal 5: Pt will complete UB bathing/dressing/grooming with Supervision for increased IND in self care  Long term goals  Time Frame for Long term goals : by discharge   Long term goal 1: Pt will complete toileting/grooming/dressing Mod I and bathing with Supervison with Good safety with use of AE/DME/modified techniques for increased IND with self care  Long term goal 2: Pt will complete functional mobility/transferes during functional activity Mod I with Good safety and RW for increased IND in ADL's  Long term goal 3: Pt will verbalize/demonstrate Good understanding of fall prevention strategies for increased safety/IND in self care  Long term goal 4: Pt will improve L hand strength for self care as evidence by a 3# improvement in dynomometor testing   Long term goal 5: Pt will improve L [facility] as evidence by a 20 second improvement on 9 hole peg test for increased IND with self care        03/26/22 1217 03/26/22 1530   OT Individual Minutes   Time In 0901 1332   Time Out 1000 1403   Minutes 59 31     Electronically signed by PATRICIA Ferrara on 3/26/22 at 3:31 PM EDT

## 2022-03-26 NOTE — PLAN OF CARE
Problem: Skin Integrity:  Goal: Will show no infection signs and symptoms  Description: Will show no infection signs and symptoms  3/26/2022 0148 by Maribel Bojorquez RN  Outcome: Ongoing  Goal: Absence of new skin breakdown  Description: Absence of new skin breakdown  3/26/2022 0148 by Maribel Bojorquez RN  Outcome: Ongoing     Problem: Falls - Risk of:  Goal: Will remain free from falls  Description: Will remain free from falls  3/26/2022 0148 by Maribel Bojorquez RN  Outcome: Ongoing  Goal: Absence of physical injury  Description: Absence of physical injury  3/26/2022 0148 by Maribel Bojorquez RN  Outcome: Ongoing     Problem: Pain:  Goal: Pain level will decrease  Description: Pain level will decrease  3/26/2022 0148 by Maribel Bojorquez RN  Outcome: Ongoing  Goal: Control of acute pain  Description: Control of acute pain  3/26/2022 0148 by Maribel Bojorquez RN  Outcome: Ongoing  Goal: Control of chronic pain  Description: Control of chronic pain  3/26/2022 0148 by Maribel Bojorquez RN  Outcome: Ongoing     Problem: Musculor/Skeletal Functional Status  Goal: Highest potential functional level  3/26/2022 0148 by Maribel Bojorquez RN  Outcome: Ongoing  Goal: Absence of falls  3/26/2022 0148 by Maribel Bojorquez RN  Outcome: Ongoing     Problem: Musculor/Skeletal Functional Status  Goal: Highest potential functional level  3/26/2022 0148 by Maribel Bojorquez RN  Outcome: Ongoing  Goal: Absence of falls  3/26/2022 0148 by Maribel Bojorquez RN  Outcome: Ongoing     Problem: Nutrition  Goal: Optimal nutrition therapy  3/26/2022 0148 by Maribel Bojorquez RN  Outcome: Ongoing

## 2022-03-26 NOTE — PLAN OF CARE
Problem: Skin Integrity:  Goal: Will show no infection signs and symptoms  Description: Will show no infection signs and symptoms  3/26/2022 1506 by Robyn Ayala RN  Outcome: Ongoing  3/26/2022 0148 by Connie Carnes RN  Outcome: Ongoing  Goal: Absence of new skin breakdown  Description: Absence of new skin breakdown  3/26/2022 1506 by Robyn Ayala RN  Outcome: Ongoing  3/26/2022 0148 by Connie Carnes RN  Outcome: Ongoing     Problem: Falls - Risk of:  Goal: Will remain free from falls  Description: Will remain free from falls  3/26/2022 1506 by Robyn Ayala RN  Outcome: Ongoing  3/26/2022 0148 by Connie Carnes RN  Outcome: Ongoing  Goal: Absence of physical injury  Description: Absence of physical injury  3/26/2022 1506 by Robyn Ayala RN  Outcome: Ongoing  3/26/2022 0148 by Connie Carnes RN  Outcome: Ongoing     Problem: Pain:  Goal: Pain level will decrease  Description: Pain level will decrease  3/26/2022 1506 by Robyn Ayala RN  Outcome: Ongoing  3/26/2022 0148 by Connie Carnes RN  Outcome: Ongoing  Goal: Control of acute pain  Description: Control of acute pain  3/26/2022 1506 by Robyn Ayala RN  Outcome: Ongoing  3/26/2022 0148 by Connie Carnes RN  Outcome: Ongoing  Goal: Control of chronic pain  Description: Control of chronic pain  3/26/2022 1506 by Robyn Ayala RN  Outcome: Ongoing  3/26/2022 0148 by Connie Carnes RN  Outcome: Ongoing     Problem: Mobility - Impaired:  Goal: Mobility will improve  Description: Mobility will improve  3/26/2022 1506 by Robyn Ayala RN  Outcome: Ongoing  3/26/2022 0148 by Connie Carnes RN  Outcome: Ongoing

## 2022-03-26 NOTE — PROGRESS NOTES
Physical Therapy  Facility/Department: [facility] ACUTE REHAB  Daily Treatment Note  NAME: Princess Blevins  : 1950  MRN: [id number]    Date of Service: 3/26/2022    Discharge Recommendations:  Patient would benefit from continued therapy after discharge        Assessment   Body structures, Functions, Activity limitations: Decreased functional mobility ; Decreased ADL status; Decreased ROM; Decreased strength;Decreased endurance;Decreased balance;Decreased posture; Increased pain  Treatment Diagnosis: Impaired functional mobility 2* ataxia  Specific instructions for Next Treatment: transfers, amb, balance, standing program  REQUIRES PT FOLLOW UP: Yes  Activity Tolerance  Activity Tolerance: Patient limited by pain; Patient limited by fatigue;Patient limited by endurance     Patient Diagnosis(es): There were no encounter diagnoses. has a past medical history of Allergic rhinitis, cause unspecified, Back pain, Bowel obstruction (HCC), C. difficile diarrhea, CAD (coronary artery disease), Cardiac murmur, Cellulitis, Cellulitis, Cerebral artery occlusion with cerebral infarction ([address] COVID-19, Diverticulosis of colon (without mention of hemorrhage), GERD (gastroesophageal reflux disease), GERD (gastroesophageal reflux disease), History of blood transfusion, History of CHF (congestive heart failure), History of MI (myocardial infarction), History of ovarian cyst, History of peritonitis, HTN (hypertension), Hx of blood clots, Hyperlipidemia, Intestinal or peritoneal adhesions with obstruction (postoperative) (postinfection) ([address] Kidney infection, Lateral epicondylitis  of elbow, MDRO (multiple drug resistant organisms) resistance, Muscle strain, Other abnormal glucose, PONV (postoperative nausea and vomiting), Pre-diabetes, Restless legs syndrome (RLS), Snores, 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[address] TIA (transient ischemic attack), Uses walker, Vitamin D deficiency, Wears glasses, and Wellness examination.    has a past surgical history that includes Ovary removal (1970); colectomy (1969); Appendectomy (1968); Ovary removal (1971); Hysterectomy (1973); Bunionectomy (Left); sinus surgery (2004); Colonoscopy; other surgical history (08/14/2014); cyst removal (Right); Wrist fracture surgery (Left, 03/05/2019); Upper gastrointestinal endoscopy (N/A, 05/31/2019); Upper gastrointestinal endoscopy (N/A, 08/05/2019); Upper gastrointestinal endoscopy (N/A, 08/23/2019); Abdomen surgery (1976); lumbar fusion (N/A, 02/10/2020); Cardiac catheterization; Cardiac catheterization (2005); [place] tooth extraction; lumbar fusion (N/A, 06/17/2020); back surgery; cervical fusion (05/21/2021); and cervical fusion (N/A, 5/21/2021). Restrictions  Restrictions/Precautions  Restrictions/Precautions: General Precautions,Fall Risk  Required Braces or Orthoses?: No  Implants present? : Metal implants (cervical and lumbar surgeries, L wrist ORIF)  Position Activity Restriction  Other position/activity restrictions: up as tolerated  Subjective   General  Chart Reviewed: Yes  Additional Pertinent Hx: TIA  Response To Previous Treatment: Patient with no complaints from previous session. Family / Caregiver Present: No  Referring Practitioner: Dr Jordin Acosta.   Subjective  Subjective: Patient reports sleeping well throughout the night, agreeable to therapy   Pain Screening  Patient Currently in Pain: Yes  Pain Assessment  Pain Assessment: 0-10  Pain Level: 7  Pain Type: Acute pain  Pain Location: Chest  Pain Orientation: Mid  Vital Signs  Patient Currently in Pain: Yes       Orientation  Orientation  Overall Orientation Status: Within Normal Limits  Objective      Transfers  Sit to Stand: Minimal Assistance;Contact guard assistance  Stand to sit: Minimal Assistance;Contact guard assistance  Bed to Chair: Minimal assistance;Contact guard assistance  Ambulation  Ambulation?: Yes  Ambulation 1  Surface: level tile  Device: Rolling Walker  Assistance: Minimal assistance  Quality of Gait: slow tawanda with step to pattern, flat foot LE at contact, decreased stance on L LE; downward gaze and cervical flexion  Gait Deviations: Decreased step length;Decreased step height;Shuffles; Slow Tawanda  Distance: 217ft  Stairs/Curb  Stairs?: Yes  Stairs  # Steps : 10  Stairs Height:  (4\"/6\")  Rails: Bilateral  Device: No Device  Assistance: Contact guard assistance  Comment: patient educated in step to pattern with fair follow through  Wheelchair Activities  Propulsion: Yes  Propulsion 1  Propulsion: Manual  Level: Level Tile  Method: RUE;LUE;LLE;RLE  Level of Assistance: Modified independent  Distance: 90ft        Other exercises  Other exercises?: Yes  Other exercises 1: standing B Le exercises x10 reps performed in // bars paige seated rest breaks PRN  Other exercises 2: Seated Ex: BLE x20 reps with #2 and green tband   Other exercises 3: NuStep L1 x10 minutes      Goals  Short term goals  Time Frame for Short term goals: 1 week  Short term goal 1: Pt to demostrate bed mobility CGA/Jennifer. Short term goal 2: Pt to perform transfers CGA. Short term goal 3: Pt to amb 100'-150' with device, CGA. Short term goal 4: Pt to improve BLE strength by 1/2 MMG. Short term goal 5: Pt to improve standing balance to [facility]. Short term goal 6: Pt able to perform 4\" and 6\" steps x3 in //bars or acute stair way, with 2 B UE support, min A  Long term goals  Time Frame for Long term goals : By DC  Long term goal 1: Pt able to perform bed mobility at [facility]  Long term goal 2: Pt able to transfer at supervsion level. Long term goal 3:  Pt able to ambulate house hold distances with assistive device mod-I  Long term goal 4:  Pt able to ambulate distance of 150 ft, level surfaces and shorter distance outside terraine at [facility]. Long term goal 5: Pt able to propel w/c on level surface, distance of 150 ft, supervsion level.    Long term goal 6: Improve 2MWT distance to atleast 120 ft to improve overall evgeny. Long term goal 7: Pt to improve Tinetti balance score to atlest 20/28 to reduce fall risk. Long term goal 8: Pt donny to perform cub step with B UE support and/or donny to goup and down 4 to 5 steps with 2 rails at min A   Patient Goals   Patient goals : To get stronger    Plan    Plan  Times per week: 1.5 hr/day, 5 to 7 days/week. Specific instructions for Next Treatment: transfers, amb, balance, standing program  Current Treatment Recommendations: Strengthening,Balance Training,Functional Mobility Training,Transfer Training,Endurance Training,Gait Training,Equipment Evaluation, Education, & procurement,Patient/Caregiver Education & Training,Safety Education & Training,Stair training,Wheelchair Mobility Training  Safety Devices  Type of devices:  All fall risk precautions in place,Call light within reach,Gait belt,Bed alarm in place,Patient at risk for falls,Nurse notified        03/26/22 1116 03/26/22 1406   PT Individual Minutes   Time In 1107 1404   Time Out 5096 3700   Minutes 56 33   PT Concurrent Minutes   Time In  --  8291   Time Out  --  1448   Minutes  --  11     Electronically signed by Elida Worthington PTA on 3/26/22 at 3:18 PM EDT

## 2022-03-26 NOTE — PROGRESS NOTES
Physical Medicine & Rehabilitation  Progress Note    3/26/2022 11:50 AM     CC: Ambulatory and ADL dysfunction due to neuropathy with ataxia    Subjective:   Substernal discomfort though improved. Notes occasional cough. ROS:  Denies fevers, chills, sweats. No chest pain, palpitations, lightheadedness. Denies coughing, wheezing or shortness of breath. Denies abdominal pain, nausea, diarrhea or constipation. No new areas of joint pain. Denies new areas of numbness or weakness. Denies new anxiety or depression issues. No new skin problems. Rehabilitation:   PT:  Restrictions/Precautions: General Precautions,Fall Risk  Implants present? : Metal implants (cervical and lumbar surgeries, L wrist ORIF)  Other position/activity restrictions: up as tolerated   Transfers  Sit to Stand: Minimal Assistance,Contact guard assistance  Stand to sit: Minimal Assistance,Contact guard assistance  Bed to Chair: Minimal assistance,Contact guard assistance  Stand Pivot Transfers: Contact guard assistance  Comment: mod A from bed, min A from chair, RW used for transfers. Ambulation 1  Surface: level tile  Device: Rolling Walker  Assistance: Minimal assistance  Quality of Gait: slow tawanda with step to pattern, flat foot LE at contact, decreased stance on L LE; downward gaze and cervical flexion  Gait Deviations: Decreased step length,Decreased step height,Shuffles,Slow Tawanda  Distance: 217ft  Comments: Pt is min A for safety due to compensation of  LLE and poor endurance. Pt has decreased step length and height with intermittent shuffling gait. Pt needs increase time for amb due to slow tawanda.           OT:  ADL  Equipment Provided: Sock aid,Long-handled shoe horn,Long-handled sponge,Other (comment) (dysem )  Feeding: Setup,Increased time to complete (pt reports diffulty keeping food on utensils )  Grooming: Setup (seated at the sink. )  UE Bathing: None (Pt declines this AM)  LE Bathing: Minimal assistance (A washing buttocks standing at sink)  UE Dressing: Contact guard assistance (CGA due to increased chest pain with movement)  LE Dressing: Minimal assistance (CGA using reacher for threading, A pulling over hips)  Toileting: Minimal assistance,Increased time to complete (A wiping post BM. )  Additional Comments: QUINONES facilitated pt in ADLs at the sink this AM. Dycem placed at feet and shoes donned for stability with all transfers. Quinones provided CGA and instruction for use of reacher to thread BLEs with fair carryover. Balance  Sitting Balance: Supervision  Standing Balance: Minimal assistance (slight posterior lean noted. )   Standing Balance  Time: 30-60 sec X4  Activity: functional transfers, ADLs  Comment: 1-2 UE support; grab bars/RW  Functional Mobility  Functional - Mobility Device: Wheelchair  Activity: To/from bathroom,To/From therapy gym  Assist Level: Stand by assistance  Functional Mobility Comments: Able to self propell to/from bathroom with increased time; Did not self propell to/from therapy gym      Bed mobility  Rolling to Left: Minimal assistance  Rolling to Right: Minimal assistance  Supine to Sit: Moderate assistance  Sit to Supine: Minimal assistance  Scooting: Moderate assistance  Comment: Pt reporting 8/10 pain this AM with bed mobility   Transfers  Stand Pivot Transfers: Minimal assistance  Sit to stand: Moderate assistance (from recliner. CGA w/c level)  Stand to sit: Minimal assistance  Transfer Comments: 1-2 UE support on grab bars; VC's for hand placement; L foot blocking due to slidding. Slidding improved with shoes on feet rather than  socks. Toilet Transfers  Toilet - Technique: Stand pivot  Equipment Used: Grab bars  Toilet Transfer: Minimal assistance  Toilet Transfers Comments: VC's for hand placement/technique      Shower Transfers  Shower - Transfer From: Wheelchair  Shower - Transfer Type: To and From  Shower - Transfer To:  Shower seat with back  Shower - Technique: Stand pivot  Shower Transfers: Minimal assistance  Shower Transfers Comments: VC's for hand placement; blocking of L foot due to slidding   Wheelchair Bed Transfers  Wheelchair/Bed - Technique: Stand pivot  Equipment Used: Other (RW)  Level of Asssistance: Moderate assistance  Wheelchair Transfers Comments: with RW; Blocking of L foot due to slipping      ST:            Objective:  BP (!) 113/46   Pulse 65   Temp 98.4 °F (36.9 °C) (Oral)   Resp 18   Ht 5' 3\" (1.6 m)   Wt 180 lb (81.6 kg)   SpO2 92%   BMI 31.89 kg/m²  I Body mass index is 31.89 kg/m². I   Wt Readings from Last 1 Encounters:   22 180 lb (81.6 kg)      Temp (24hrs), Av.6 °F (37 °C), Min:98.4 °F (36.9 °C), Max:98.8 °F (37.1 °C)         GEN: well developed, well nourished, no acute distress  HEENT: Normocephalic atraumatic, EOMI, mucous membranes pink and moist  CV: RRR, no murmurs, rubs or gallops  PULM: CTAB, no rales or rhonchi. Respirations WNL and unlabored, less tender to palpation sternal area  ABD: soft, NT, ND, +BS and equal  NEURO: A&O x3. Sensation intact to light touch. MSK: At least antigravity to 4 -/5 strength  EXTREMITIES: No calf tenderness to palpation bilaterally. No edema BLEs  SKIN: warm dry and intact with good turgor  PSYCH: appropriately interactive. Affect WNL.           Medications   Scheduled Meds:   gabapentin  200 mg Oral BID    lidocaine  1 patch TransDERmal Daily    busPIRone  5 mg Oral TID    amLODIPine  10 mg Oral Daily    apixaban  5 mg Oral BID    atorvastatin  40 mg Oral Nightly    calcium carbonate w/vitamin D  1 tablet Oral Daily    carvedilol  12.5 mg Oral BID    citalopram  20 mg Oral Daily    fenofibrate  160 mg Oral Daily    ferrous sulfate  325 mg Oral BID    furosemide  20 mg Oral BID    lisinopril  30 mg Oral Daily    pramipexole  0.5 mg Oral Nightly    cyanocobalamin  1,000 mcg Oral Daily    polyethylene glycol  17 g Oral Daily     Continuous Infusions:  PRN Meds:.traMADol, guaiFENesin, melatonin, magnesium hydroxide, docusate sodium, acetaminophen, senna, bisacodyl     Diagnostics:     CBC:   Recent Labs     03/24/22  0636   WBC 6.5   RBC 3.65*   HGB 11.4*   HCT 34.2*   MCV 93.9   RDW 14.1        BMP:   Recent Labs     03/24/22  0636      K 4.6      CO2 27   BUN 55*   CREATININE 1.21*     BNP: No results for input(s): BNP in the last 72 hours. PT/INR: No results for input(s): PROTIME, INR in the last 72 hours. APTT: No results for input(s): APTT in the last 72 hours. CARDIAC ENZYMES: No results for input(s): CKMB, CKMBINDEX, TROPONINT in the last 72 hours. Invalid input(s): CKTOTAL;3  FASTING LIPID PANEL:  Lab Results   Component Value Date    CHOL 103 05/20/2019    HDL 74 03/12/2021    TRIG 66 05/20/2019     LIVER PROFILE: No results for input(s): AST, ALT, ALB, BILIDIR, BILITOT, ALKPHOS in the last 72 hours. I/O (24Hr): Intake/Output Summary (Last 24 hours) at 3/26/2022 1150  Last data filed at 3/26/2022 0547  Gross per 24 hour   Intake 480 ml   Output --   Net 480 ml       Glu last 24 hour  No results for input(s): POCGLU in the last 72 hours. No results for input(s): CLARITYU, COLORU, PHUR, SPECGRAV, PROTEINU, RBCUA, BLOODU, BACTERIA, NITRU, WBCUA, LEUKOCYTESUR, YEAST, GLUCOSEU, BILIRUBINUR in the last 72 hours. Impression/Plan:    1. Ataxia with L sided weakness:  PT/OT for gait, mobility, strengthening, endurance, ADLs, and self care. On Pramipexole  2. Cervical myelopathy: Hx C3-6 decompression. Has gabapentin and prn Tylenol for pain. 3. Cough -Added Robitussin, continues incentive spirometry t-may be due to limited deep breaths due to sternal discomfort  4. Chronic diastolic heart failure/HTN/CAD: on amlodipine, lipitor, carvedilol, fenofibrate, lisinopril, furosemide  5. Major depressive disorder: on celexa. Monitor for now -addition of BuSpar by internal medicine, consider psych evaluation if patient agreeable  6.  Chronic DVT: on eliquis - monitor with history of falls  7. Anemia: Hb near normal. On ferrous sulfate. Will monitor  8. Lower extremity cellulitis: completed Keflex   9. BIN: stable. Monitoring BMP,  on Lasix lisinopril  10. Insomnia: has melatonin prn   11. Bowel Management: Miralax daily, senokot prn, dulcolax prn. 12. DVT Prophylaxis:  SCD's while in bed, PRAVEEN's  and Eliquis  13. Internal medicine for medical management        Anthony Burks. Reema Zambrano MD       This note is created with the assistance of a speech recognition program.  While intending to generate a document that actually reflects the content of the visit, the document can still have some errors including those of syntax and sound a like substitutions which may escape proof reading.   In such instances, actual meaning can be extrapolated by contextual diversion

## 2022-03-26 NOTE — PROGRESS NOTES
[facility] Internal Medicine    CONSULTATION / HISTORY AND PHYSICAL EXAMINATION            Date:   3/26/2022  Patient name:  Arlet Wesley  Date of admission:  3/23/2022  4:52 PM  MRN:   [id number]  Account:  [de-identified]  YOB: 1950  PCP:    Ronda Flores MD  Room:   [address] [place] [address]  Code Status:    Full Code    Physician Requesting Consult: Brigitte Brito MD    Reason for Consult:  Medical management    Chief Complaint:     No chief complaint on file. Debility    History Obtained From:     Patient, EMR, nursing staff    History of Present Illness:     55-year-old female initially presented to the multiple falls over several weeks in the setting of chronic cervical spine stenosis with myelopathy status post cervical fusion follows with neurosurgery  Recently started on Eliquis for acute DVT right leg  Trauma work-up CT brain in this admission unremarkable other than multiple remote and healing rib fractures, patient uses walker at home  Neurology review was obtained for worsening gait instability-MRI thoracic spine did show T2-T3 and T5-T7 moderate neural foraminal narrowing  Also patient was noted to have bilateral leg swelling with some stasis dermatitis, early cellulitis-started on diuresis as well as antibiotics  3/26   Patient feeling better  Feels that anxiety is better after starting buspar   Working with PT    Past Medical History:     Past Medical History:   Diagnosis Date    Allergic rhinitis, cause unspecified     Back pain     lumbar    Bowel obstruction ([address]     history of due to scar tissue, resolved non-surgically    C. difficile diarrhea     CAD (coronary artery disease)     no stent needed per pt.  Dr. Brittany Dejesus did cath at  2005    Cardiac murmur     Cellulitis     left leg    Cellulitis 2017 August    leg left leg/bug bite    Cerebral artery occlusion with cerebral infarction [facility])     TIA 2014    COVID-19     ONE YR AGO IN 4/25/2020 fever and cough    Diverticulosis of colon (without mention of hemorrhage)     GERD (gastroesophageal reflux disease)     GERD (gastroesophageal reflux disease)     on rx    History of blood transfusion     approx 2020        History of CHF (congestive heart failure)     History of MI (myocardial infarction) 2005    thought due to a blood clot    History of ovarian cyst 1970    had oopherectomy holly    History of peritonitis 1968    due to ruptured appendix age 13    HTN (hypertension)     Hx of blood clots     right leg    Hyperlipidemia     Intestinal or peritoneal adhesions with obstruction (postoperative) (postinfection) ([address]     Kidney infection     renal failure/sepsis/spider bite    Lateral epicondylitis  of elbow     MDRO (multiple drug resistant organisms) resistance     c diff    Muscle strain     right posterior shoulder    Other abnormal glucose     PONV (postoperative nausea and vomiting)     dry heaves    Pre-diabetes     Restless legs syndrome (RLS)     Snores     no cpap    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)     TIA (transient ischemic attack) 2014    Uses walker     Vitamin D deficiency     Wears glasses     Wellness examination     last seen 2 weeks ago        Past Surgical History:     Past Surgical History:   Procedure Laterality Date    ABDOMEN SURGERY  1976    benign tumor removed near remaining ovary, 1.5 pounds    APPENDECTOMY  1968    appendix ruptured, developed peritonitis    BACK SURGERY      BUNIONECTOMY Left     along with calcium deposits removed   [address]  2005    negative    CERVICAL FUSION  05/21/2021    POSTERIOR C3-6 LAMINECTOMY, PARTIAL C7 LAMINECTOMY, FUSION C3-C6, SILVERCORD    CERVICAL FUSION N/A 5/21/2021    POSTERIOR C3-6 LAMINECTOMY, PARTIAL C7 LAMINECTOMY, FUSION C3-C6, SILVERCORD performed by Nilda Myers DO at [address] [place] [address]    12 INCHES REMOVED D/T OBSTRUCTION  COLONOSCOPY      CYST REMOVAL Right     right facial    HYSTERECTOMY  1973    taken as a result of recurring cysts    LUMBAR FUSION N/A 02/10/2020    LUMBAR L4-5 POSTERIOR  DECOMPRESSION INSTRUMENTATION FUSION WCEMENT AUGMENTATION/ performed by Pancho Grajeda MD at [facility] N/A 06/17/2020    L5-S1 PLIF L4-L5 REVISION performed by Pancho Grajeda MD at [address] [place] [address]  08/14/2014    FESS    OVARY REMOVAL  1970    UNILATERAL due to cyst    OVARY REMOVAL  1971    partial, due to cyst    SINUS SURGERY  2004    UPPER GASTROINTESTINAL ENDOSCOPY N/A 05/31/2019    EGD ESOPHAGOGASTRODUODENOSCOPY performed by Maggy Hartmann MD at [facility] N/A 08/05/2019    EGD BIOPSY performed by Sergio Kidd MD at [facility] N/A 08/23/2019    EGD BIOPSY performed by Maggy Hartmann MD at [address] [facility] 03/05/2019    WRIST OPEN REDUCTION INTERNAL FIXATION performed by Fletcher Casiano MD at [address]        Medications Prior to Admission:     Prior to Admission medications    Medication Sig Start Date End Date Taking?  Authorizing Provider   amLODIPine (NORVASC) 10 MG tablet Take 1 tablet by mouth daily 3/10/22   Zonia Singh MD   furosemide (LASIX) 40 MG tablet Take 1 tablet by mouth 2 times daily 3/1/22   MAIK Godinez CNP   carvedilol (COREG) 12.5 MG tablet Take 1 tablet by mouth 2 times daily 2/23/22   MAIK Goidnez CNP   apixaban (ELIQUIS) 5 MG TABS tablet Please take 2 tablets by mouth for the first week then take 1 tablet by mouth BID for acute DVT  Patient taking differently: Take 5 mg by mouth 2 times daily take 1 tablet by mouth BID for acute DVT 2/17/22   MAIK Godinez CNP   atorvastatin (LIPITOR) 80 MG tablet Take 0.5 tablets by mouth nightly 2/1/22   MAIK Godinez CNP   lisinopril (PRINIVIL;ZESTRIL) 30 MG tablet Take 1 tablet by mouth daily 1/21/22   Aleksandr Shafer MD   fenofibrate micronized (LOFIBRA) 134 MG capsule Take 1 capsule by mouth every morning (before breakfast) 1/13/22   Aleksandr Shafer MD   pramipexole (MIRAPEX) 0.5 MG tablet Take 1 tablet by mouth nightly 1/6/22   Aleksandr Shafer MD   citalopram (CELEXA) 20 MG tablet Take 1 tablet by mouth daily 1/3/22   Diane Carranza MD   nitroGLYCERIN (NITROSTAT) 0.4 MG SL tablet Place 1 tablet under the tongue every 5 minutes as needed for Chest pain 9/1/21   Aleksandr Shafer MD   docusate sodium (COLACE) 100 MG capsule Take 1 capsule by mouth 2 times daily as needed for Constipation 5/30/21   Raj Marley DO   diphenhydrAMINE HCl (BENADRYL ALLERGY PO) Take 1 capsule by mouth daily Takes q HS    Historical Provider, MD   cyanocobalamin ([employer] VITAMIN B12) 1000 MCG tablet Take 1 tablet by mouth daily 12/3/20   Aleksandr Shafer MD   ferrous sulfate (IRON 325) 325 (65 Fe) MG tablet Take 1 tablet by mouth 2 times daily 7/2/20   Tamiko Ledbetter MD   VITAMIN D, ERGOCALCIFEROL, PO Take by mouth    Historical Provider, MD   Lancets MISC 1 each by Does not apply route daily 10/10/19   Arabella Boas, MD   blood glucose monitor strips Test 2 times a day & as needed for symptoms of irregular blood glucose. 10/10/19   Arabella Boas, MD   Calcium Carbonate-Vitamin D (CALCIUM 500/D) 500-125 MG-UNIT TABS Take 1 tablet by mouth daily     Historical Provider, MD        Allergies:     Bactrim [sulfamethoxazole-trimethoprim], Codeine, and Seasonal    Social History:     Tobacco:    reports that she quit smoking about 4 years ago. Her smoking use included cigarettes. She started smoking about 26 years ago. She has a 10.00 pack-year smoking history. She has never used smokeless tobacco.  Alcohol:      reports no history of alcohol use. Drug Use:  reports no history of drug use.     Family History:     Family History   Problem Relation Age of Onset    Stroke Mother     Diabetes Mother     Heart Disease Mother     High Blood Pressure Mother     Heart Disease Father     Heart Disease Brother     High Blood Pressure Brother     Heart Disease Maternal Grandmother     High Blood Pressure Sister        Review of Systems:     Positive and Negative as described in HPI. CONSTITUTIONAL:  negative for fevers, chills, sweats, fatigue, weight loss  HEENT:  negative for vision, hearing changes, runny nose, throat pain  RESPIRATORY:  negative for shortness of breath, cough, congestion, wheezing. CARDIOVASCULAR:  negative for chest pain, palpitations. GASTROINTESTINAL:  negative for nausea, vomiting, diarrhea, constipation, change in bowel habits, abdominal pain   GENITOURINARY:  negative for difficulty of urination, burning with urination, frequency   INTEGUMENT:  negative for rash, skin lesions, easy bruising   HEMATOLOGIC/LYMPHATIC:  negative for swelling/edema   ALLERGIC/IMMUNOLOGIC:  negative for urticaria , itching  ENDOCRINE:  negative increase in drinking, increase in urination, hot or cold intolerance  MUSCULOSKELETAL:  negative joint pains, muscle aches, swelling of joints  NEUROLOGICAL:  negative for headaches, dizziness, lightheadedness, numbness, pain, tingling extremities      Physical Exam:     BP (!) 113/46   Pulse 65   Temp 98.4 °F (36.9 °C) (Oral)   Resp 18   Ht 5' 3\" (1.6 m)   Wt 180 lb (81.6 kg)   SpO2 92%   BMI 31.89 kg/m²   Temp (24hrs), Av.6 °F (37 °C), Min:98.4 °F (36.9 °C), Max:98.8 °F (37.1 °C)    No results for input(s): POCGLU in the last 72 hours. Intake/Output Summary (Last 24 hours) at 3/26/2022 1515  Last data filed at 3/26/2022 0547  Gross per 24 hour   Intake 480 ml   Output --   Net 480 ml       General Appearance:  alert, well appearing, and in no acute distress  Head:  normocephalic, atraumatic.   Eye: no icterus, redness, pupils equal and reactive, extraocular eye movements intact, conjunctiva clear  Ear: normal external ear, no discharge, hearing intact  Nose:  no drainage noted  Mouth: mucous membranes moist  Neck: supple, no carotid bruits, thyroid not palpable  Lungs: Bilateral equal air entry, clear to ausculation, no wheezing, rales or rhonchi, normal effort  Cardiovascular: normal rate, regular rhythm, no murmur, gallop, rub. Abdomen: Soft, nontender, nondistended, normal bowel sounds, no hepatomegaly or splenomegaly  Neurologic: There are no new focal motor or sensory deficits, normal muscle tone and bulk, no abnormal sensation, normal speech, cranial nerves II through XII grossly intact  Skin: No gross lesions, rashes, bruising or bleeding on exposed skin area  Extremities:  peripheral pulses palpable, no pedal edema or calf pain with palpation  Psych: normal affect    Investigations:      Laboratory Testing:  No results found for this or any previous visit (from the past 24 hour(s)). Imaging/Diagonstics:  Recent data reviewed    Assessment :      Primary Problem  Cervical myelopathy [facility])    Active Hospital Problems    Diagnosis Date Noted    [facility] [G95.9] 03/17/2022       Plan:     Multiple falls, gait instability, multiple remote and healing rib fractures leading to chest pain-needs PT OT  Neural foraminal narrowing of thoracic spine-neurology as reviewed-recommend rehab and physical therapy  Hypertension-continue amlodipine, Coreg  Right leg DVT-continue Eliquis  Chronic diastolic CHF-currently compensated-continue Coreg, Lipitor, Lasix, lisinopril  Depression -patient noted to be very tearful today-is already on maximal dose of  Celexa, will add BuSpar    DVT prophylaxis-patient on Eliquis    Consultations:   IP CONSULT TO DIETITIAN  IP CONSULT TO SOCIAL WORK  IP CONSULT TO INTERNAL MEDICINE      Beto Louis MD  3/26/2022  3:15 PM    Copy sent to Dr. Beto Louis MD    Please note that this chart was generated using voice recognition Dragon dictation software.   Although every effort was made to ensure the accuracy of this automated transcription, some errors in transcription may have occurred.

## 2022-03-27 ENCOUNTER — APPOINTMENT (OUTPATIENT)
Dept: GENERAL RADIOLOGY | Age: 72
DRG: 949 | End: 2022-03-27
Attending: PHYSICAL MEDICINE & REHABILITATION
Payer: MEDICARE

## 2022-03-27 PROCEDURE — 2500000003 HC RX 250 WO HCPCS: Performed by: PHYSICAL MEDICINE & REHABILITATION

## 2022-03-27 PROCEDURE — 71046 X-RAY EXAM CHEST 2 VIEWS: CPT

## 2022-03-27 PROCEDURE — 6370000000 HC RX 637 (ALT 250 FOR IP): Performed by: PHYSICAL MEDICINE & REHABILITATION

## 2022-03-27 PROCEDURE — 99232 SBSQ HOSP IP/OBS MODERATE 35: CPT | Performed by: INTERNAL MEDICINE

## 2022-03-27 PROCEDURE — 97530 THERAPEUTIC ACTIVITIES: CPT

## 2022-03-27 PROCEDURE — 1180000000 HC REHAB R&B

## 2022-03-27 PROCEDURE — 99232 SBSQ HOSP IP/OBS MODERATE 35: CPT | Performed by: PHYSICAL MEDICINE & REHABILITATION

## 2022-03-27 PROCEDURE — 97535 SELF CARE MNGMENT TRAINING: CPT

## 2022-03-27 PROCEDURE — 97116 GAIT TRAINING THERAPY: CPT

## 2022-03-27 PROCEDURE — 6370000000 HC RX 637 (ALT 250 FOR IP): Performed by: INTERNAL MEDICINE

## 2022-03-27 PROCEDURE — 97542 WHEELCHAIR MNGMENT TRAINING: CPT

## 2022-03-27 PROCEDURE — 97110 THERAPEUTIC EXERCISES: CPT

## 2022-03-27 RX ORDER — GUAIFENESIN 100 MG/5ML
100 SYRUP ORAL 2 TIMES DAILY PRN
Status: DISCONTINUED | OUTPATIENT
Start: 2022-03-27 | End: 2022-04-08

## 2022-03-27 RX ORDER — AMLODIPINE BESYLATE 5 MG/1
5 TABLET ORAL DAILY
Status: DISCONTINUED | OUTPATIENT
Start: 2022-03-28 | End: 2022-04-12 | Stop reason: HOSPADM

## 2022-03-27 RX ADMIN — TRAMADOL HYDROCHLORIDE 50 MG: 50 TABLET, COATED ORAL at 15:23

## 2022-03-27 RX ADMIN — GUAIFENESIN 100 MG: 200 SOLUTION ORAL at 15:23

## 2022-03-27 RX ADMIN — FUROSEMIDE 20 MG: 20 TABLET ORAL at 14:36

## 2022-03-27 RX ADMIN — GABAPENTIN 200 MG: 100 CAPSULE ORAL at 07:15

## 2022-03-27 RX ADMIN — BUSPIRONE HYDROCHLORIDE 5 MG: 5 TABLET ORAL at 20:51

## 2022-03-27 RX ADMIN — APIXABAN 5 MG: 5 TABLET, FILM COATED ORAL at 20:50

## 2022-03-27 RX ADMIN — PRAMIPEXOLE DIHYDROCHLORIDE 0.5 MG: 0.25 TABLET ORAL at 20:50

## 2022-03-27 RX ADMIN — CARVEDILOL 12.5 MG: 12.5 TABLET, FILM COATED ORAL at 07:15

## 2022-03-27 RX ADMIN — FERROUS SULFATE TAB 325 MG (65 MG ELEMENTAL FE) 325 MG: 325 (65 FE) TAB at 07:16

## 2022-03-27 RX ADMIN — GABAPENTIN 200 MG: 100 CAPSULE ORAL at 20:50

## 2022-03-27 RX ADMIN — CYANOCOBALAMIN TAB 1000 MCG 1000 MCG: 1000 TAB at 07:15

## 2022-03-27 RX ADMIN — FERROUS SULFATE TAB 325 MG (65 MG ELEMENTAL FE) 325 MG: 325 (65 FE) TAB at 20:50

## 2022-03-27 RX ADMIN — TRAMADOL HYDROCHLORIDE 50 MG: 50 TABLET, COATED ORAL at 07:15

## 2022-03-27 RX ADMIN — FENOFIBRATE 160 MG: 160 TABLET ORAL at 07:15

## 2022-03-27 RX ADMIN — FUROSEMIDE 20 MG: 20 TABLET ORAL at 07:15

## 2022-03-27 RX ADMIN — CITALOPRAM HYDROBROMIDE 20 MG: 20 TABLET ORAL at 07:16

## 2022-03-27 RX ADMIN — ATORVASTATIN CALCIUM 40 MG: 40 TABLET, FILM COATED ORAL at 20:49

## 2022-03-27 RX ADMIN — BUSPIRONE HYDROCHLORIDE 5 MG: 5 TABLET ORAL at 14:35

## 2022-03-27 RX ADMIN — LISINOPRIL 30 MG: 20 TABLET ORAL at 07:15

## 2022-03-27 RX ADMIN — GUAIFENESIN 100 MG: 200 SOLUTION ORAL at 03:51

## 2022-03-27 RX ADMIN — BUSPIRONE HYDROCHLORIDE 5 MG: 5 TABLET ORAL at 07:17

## 2022-03-27 RX ADMIN — AMLODIPINE BESYLATE 10 MG: 10 TABLET ORAL at 07:14

## 2022-03-27 RX ADMIN — CALCIUM CARBONATE 600 MG (1,500 MG)-VITAMIN D3 400 UNIT TABLET 1 TABLET: at 07:15

## 2022-03-27 RX ADMIN — APIXABAN 5 MG: 5 TABLET, FILM COATED ORAL at 07:15

## 2022-03-27 ASSESSMENT — PAIN DESCRIPTION - LOCATION
LOCATION: HIP
LOCATION: CHEST;NECK

## 2022-03-27 ASSESSMENT — PAIN SCALES - GENERAL
PAINLEVEL_OUTOF10: 5
PAINLEVEL_OUTOF10: 6
PAINLEVEL_OUTOF10: 5
PAINLEVEL_OUTOF10: 8
PAINLEVEL_OUTOF10: 5
PAINLEVEL_OUTOF10: 4

## 2022-03-27 ASSESSMENT — PAIN DESCRIPTION - PAIN TYPE
TYPE: ACUTE PAIN
TYPE: ACUTE PAIN

## 2022-03-27 ASSESSMENT — PAIN DESCRIPTION - ORIENTATION
ORIENTATION: RIGHT
ORIENTATION: MID

## 2022-03-27 NOTE — PROGRESS NOTES
Physical Medicine & Rehabilitation  Progress Note    3/27/2022 12:47 PM     CC: Ambulatory and ADL dysfunction due to neuropathy with ataxia    Subjective:   Substernal discomfort though improved. Continued cough    ROS:  Denies fevers, chills, sweats. No chest pain, palpitations, lightheadedness. Denies coughing, wheezing or shortness of breath. Denies abdominal pain, nausea, diarrhea or constipation. No new areas of joint pain. Denies new areas of numbness or weakness. Denies new anxiety or depression issues. No new skin problems. Rehabilitation:   PT:  Restrictions/Precautions: General Precautions,Fall Risk  Implants present? : Metal implants (Loop recorder)  Other position/activity restrictions: up as tolerated   Transfers  Sit to Stand: Minimal Assistance,Contact guard assistance  Stand to sit: Minimal Assistance,Contact guard assistance  Bed to Chair: Minimal assistance,Contact guard assistance  Stand Pivot Transfers: Contact guard assistance  Comment: mod A from bed, min A from chair, RW used for transfers. Ambulation 1  Surface: level tile  Device: Rolling Walker  Assistance: Minimal assistance  Quality of Gait: slow tawanda with step to pattern, flat foot LE at contact, decreased stance on L LE; downward gaze and cervical flexion  Gait Deviations: Decreased step length,Decreased step height,Shuffles,Slow Tawanda  Distance: 217ft  Comments: Pt is min A for safety due to compensation of  LLE and poor endurance. Pt has decreased step length and height with intermittent shuffling gait. Pt needs increase time for amb due to slow tawanda.           OT:  ADL  Equipment Provided: Sock aid,Long-handled shoe horn,Long-handled sponge,Other (comment) (dysem )  Feeding: Setup,Increased time to complete (pt reports diffulty keeping food on utensils )  Grooming: Setup (seated at the sink. )  UE Bathing: None (Pt declines this AM)  LE Bathing: Minimal assistance (A washing buttocks standing at sink)  UE Dressing: Contact guard assistance (CGA due to increased chest pain with movement)  LE Dressing: Minimal assistance (CGA using reacher for threading, A pulling over hips)  Toileting: Minimal assistance,Increased time to complete (A wiping post BM. )  Additional Comments: QUINONES facilitated pt in ADLs at the sink this AM. Dycem placed at feet and shoes donned for stability with all transfers. QUINONES provided CGA and instruction for use of reacher to thread BLEs with fair carryover. Balance  Sitting Balance: Supervision  Standing Balance: Minimal assistance (slight posterior lean noted. )   Standing Balance  Time: 30-60 sec X4  Activity: functional transfers, ADLs  Comment: 1-2 UE support; grab bars/RW  Functional Mobility  Functional - Mobility Device: Wheelchair  Activity: To/from bathroom,To/From therapy gym  Assist Level: Stand by assistance  Functional Mobility Comments: Able to self propell to/from bathroom with increased time; Did not self propell to/from therapy gym      Bed mobility  Rolling to Left: Minimal assistance  Rolling to Right: Minimal assistance  Supine to Sit: Moderate assistance  Sit to Supine: Minimal assistance  Scooting: Moderate assistance  Comment: Pt reporting 8/10 pain this AM with bed mobility   Transfers  Stand Pivot Transfers: Minimal assistance  Sit to stand: Moderate assistance (from recliner. CGA w/c level)  Stand to sit: Minimal assistance  Transfer Comments: 1-2 UE support on grab bars; VC's for hand placement; L foot blocking due to sliding. Sliding improved with shoes on feet rather than  socks. Toilet Transfers  Toilet - Technique: Stand pivot  Equipment Used: Grab bars  Toilet Transfer: Minimal assistance  Toilet Transfers Comments: VC's for hand placement/technique      Shower Transfers  Shower - Transfer From: Wheelchair  Shower - Transfer Type: To and From  Shower - Transfer To:  Shower seat with back  Shower - Technique: Stand pivot  Shower Transfers: Minimal assistance  Shower Transfers Comments: VC's for hand placement; blocking of L foot due to slidding   Wheelchair Bed Transfers  Wheelchair/Bed - Technique: Stand pivot  Equipment Used: Other (RW)  Level of Asssistance: Moderate assistance  Wheelchair Transfers Comments: with RW; Blocking of L foot due to slipping      ST:            Objective:  BP (!) 96/54   Pulse 66   Temp 98.3 °F (36.8 °C) (Oral)   Resp 18   Ht 5' 3\" (1.6 m)   Wt 180 lb (81.6 kg)   SpO2 93%   BMI 31.89 kg/m²  I Body mass index is 31.89 kg/m². I   Wt Readings from Last 1 Encounters:   22 180 lb (81.6 kg)      Temp (24hrs), Av.3 °F (36.8 °C), Min:98.3 °F (36.8 °C), Max:98.3 °F (36.8 °C)         GEN: well developed, well nourished, no acute distress  HEENT: Normocephalic atraumatic, EOMI, mucous membranes pink and moist  CV: RRR, no murmurs, rubs or gallops  PULM: CTAB, no rales or rhonchi wheezes or crackles noted. Respirations WNL and unlabored, less tender to palpation sternal area  ABD: soft, NT, ND, +BS and equal  NEURO: A&O x3. Sensation intact to light touch. MSK: At least antigravity to 4 -/5 strength  EXTREMITIES: No calf tenderness to palpation bilaterally. No edema BLEs  SKIN: warm dry and intact with good turgor  PSYCH: appropriately interactive. Affect WNL.           Medications   Scheduled Meds:   [START ON 3/28/2022] amLODIPine  5 mg Oral Daily    gabapentin  200 mg Oral BID    lidocaine  1 patch TransDERmal Daily    busPIRone  5 mg Oral TID    apixaban  5 mg Oral BID    atorvastatin  40 mg Oral Nightly    calcium carbonate w/vitamin D  1 tablet Oral Daily    carvedilol  12.5 mg Oral BID    citalopram  20 mg Oral Daily    fenofibrate  160 mg Oral Daily    ferrous sulfate  325 mg Oral BID    furosemide  20 mg Oral BID    lisinopril  30 mg Oral Daily    pramipexole  0.5 mg Oral Nightly    cyanocobalamin  1,000 mcg Oral Daily    polyethylene glycol  17 g Oral Daily     Continuous Infusions:  PRN Meds:.traMADol, guaiFENesin, melatonin, magnesium hydroxide, docusate sodium, acetaminophen, senna, bisacodyl     Diagnostics:     CBC:   No results for input(s): WBC, RBC, HGB, HCT, MCV, RDW, PLT in the last 72 hours. BMP:   No results for input(s): NA, K, CL, CO2, PHOS, BUN, CREATININE, CA in the last 72 hours. BNP: No results for input(s): BNP in the last 72 hours. PT/INR: No results for input(s): PROTIME, INR in the last 72 hours. APTT: No results for input(s): APTT in the last 72 hours. CARDIAC ENZYMES: No results for input(s): CKMB, CKMBINDEX, TROPONINT in the last 72 hours. Invalid input(s): CKTOTAL;3  FASTING LIPID PANEL:  Lab Results   Component Value Date    CHOL 103 05/20/2019    HDL 74 03/12/2021    TRIG 66 05/20/2019     LIVER PROFILE: No results for input(s): AST, ALT, ALB, BILIDIR, BILITOT, ALKPHOS in the last 72 hours. I/O (24Hr): Intake/Output Summary (Last 24 hours) at 3/27/2022 1247  Last data filed at 3/27/2022 0515  Gross per 24 hour   Intake --   Output 300 ml   Net -300 ml       Glu last 24 hour  No results for input(s): POCGLU in the last 72 hours. No results for input(s): CLARITYU, COLORU, PHUR, SPECGRAV, PROTEINU, RBCUA, BLOODU, BACTERIA, NITRU, WBCUA, LEUKOCYTESUR, YEAST, GLUCOSEU, BILIRUBINUR in the last 72 hours. Chest x-ray 3/23/2022  Old left rib fractures noted.  Examination is otherwise unremarkable.             Impression/Plan:    1. Ataxia with L sided weakness:  PT/OT for gait, mobility, strengthening, endurance, ADLs, and self care. On Pramipexole  2. Cervical myelopathy: Hx C3-6 decompression. Has gabapentin and prn Tylenol for pain. 3. Cough -increase Robitussin, if persist consider Tessalon Андерй, continues incentive spirometry -may be due to limited deep breaths due to sternal discomfort, patient also on lisinopril,  no productive cough at this time-monitor chest x-ray due to history of heart failure  4.  Chronic diastolic heart failure/HTN/CAD: on amlodipine, lipitor, carvedilol, fenofibrate, lisinopril, furosemide-low blood pressure, may need medication adjustments-defer to internal medicine  5. Major depressive disorder: on celexa. Monitor for now -addition of BuSpar by internal medicine, consider psych evaluation if patient agreeable-patient feels better today does not want psych evaluation at this time, denies suicidal /homicidal ideation  6. Chronic DVT: on eliquis - monitor with history of falls  7. Anemia: Hb near normal. On ferrous sulfate. Will monitor  8. Lower extremity cellulitis: completed Keflex   9. BIN: stable. Monitoring BMP,  on Lasix lisinopril  10. Restless left leg -Mirapex  11. Insomnia: has melatonin prn   12. Pain-Lidoderm patch to right shoulder Neurontin  13. Bowel Management: Miralax daily, senokot prn, dulcolax prn. 14. DVT Prophylaxis:  SCD's while in bed, PRAVEEN's  and Eliquis  15. Internal medicine for medical management        Tenzin El. Annetta Alves MD       This note is created with the assistance of a speech recognition program.  While intending to generate a document that actually reflects the content of the visit, the document can still have some errors including those of syntax and sound a like substitutions which may escape proof reading.   In such instances, actual meaning can be extrapolated by contextual diversion

## 2022-03-27 NOTE — PROGRESS NOTES
Physical Therapy  Facility/Department: [facility]  Daily Treatment Note  NAME: Gin Montero  : 1950  MRN: [id number]    Date of Service: 3/27/2022    Discharge Recommendations:  Patient would benefit from continued therapy after discharge   PT Equipment Recommendations  Other: Pt has lift chair, rolling walker, and a Rollator at home. Assessment   Body structures, Functions, Activity limitations: Decreased functional mobility ; Decreased ADL status; Decreased ROM; Decreased strength;Decreased endurance;Decreased balance;Decreased posture; Increased pain  Treatment Diagnosis: Impaired functional mobility 2* ataxia  Specific instructions for Next Treatment: transfers, amb, balance, standing program  REQUIRES PT FOLLOW UP: Yes  Activity Tolerance  Activity Tolerance: Patient limited by pain; Patient limited by fatigue;Patient limited by endurance     Patient Diagnosis(es): There were no encounter diagnoses.      has a past medical history of Allergic rhinitis, cause unspecified, Back pain, Bowel obstruction (HCC), C. difficile diarrhea, CAD (coronary artery disease), Cardiac murmur, Cellulitis, Cellulitis, Cerebral artery occlusion with cerebral infarction ([address] COVID-19, Diverticulosis of colon (without mention of hemorrhage), GERD (gastroesophageal reflux disease), GERD (gastroesophageal reflux disease), History of blood transfusion, History of CHF (congestive heart failure), History of MI (myocardial infarction), History of ovarian cyst, History of peritonitis, HTN (hypertension), Hx of blood clots, Hyperlipidemia, Intestinal or peritoneal adhesions with obstruction (postoperative) (postinfection) ([address] Kidney infection, Lateral epicondylitis  of elbow, MDRO (multiple drug resistant organisms) resistance, Muscle strain, Other abnormal glucose, PONV (postoperative nausea and vomiting), Pre-diabetes, Restless legs syndrome (RLS), Snores, 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[address] TIA (transient ischemic attack), Uses walker, Vitamin D deficiency, Wears glasses, and Wellness examination. has a past surgical history that includes Ovary removal (1970); colectomy (9060); Appendectomy (1968); Ovary removal (1971); Hysterectomy (1973); Bunionectomy (Left); sinus surgery (2004); Colonoscopy; other surgical history (08/14/2014); cyst removal (Right); Wrist fracture surgery (Left, 03/05/2019); Upper gastrointestinal endoscopy (N/A, 05/31/2019); Upper gastrointestinal endoscopy (N/A, 08/05/2019); Upper gastrointestinal endoscopy (N/A, 08/23/2019); Abdomen surgery (1976); lumbar fusion (N/A, 02/10/2020); Cardiac catheterization; Cardiac catheterization (2005); [place] tooth extraction; lumbar fusion (N/A, 06/17/2020); back surgery; cervical fusion (05/21/2021); and cervical fusion (N/A, 5/21/2021). Restrictions  Restrictions/Precautions  Restrictions/Precautions: General Precautions,Fall Risk  Required Braces or Orthoses?: No  Implants present? : Metal implants ( Loop recorder)  Position Activity Restriction  Other position/activity restrictions: up as tolerated  Subjective   General  Chart Reviewed: Yes  Additional Pertinent Hx: TIA  Response To Previous Treatment: Patient with no complaints from previous session. Family / Caregiver Present: No  Referring Practitioner: Dr Iain Domínguez. Subjective  Subjective: Patient reports sleeping well throughout the night, agreeable to therapy   Pain Screening  Patient Currently in Pain: Yes  Pain Assessment  Pain Assessment: 0-10  Pain Level: 5  Pain Type: Acute pain  Pain Location: Hip  Pain Orientation: Right  Vital Signs  Patient Currently in Pain: Yes       Orientation  Orientation  Overall Orientation Status: Within Normal Limits  Objective   Bed mobility  Rolling to Left: Minimal assistance  Rolling to Right: Minimal assistance  Supine to Sit: Stand by assistance  Sit to Supine:  Moderate assistance (assisted with B LEs)  Scooting: Stand by assistance  Comment: performed on mat with wedge and 3 pillows; complained of increased chest pain with supine to sit transfer  Transfers  Sit to Stand: Minimal Assistance;Contact guard assistance  Stand to sit: Minimal Assistance;Contact guard assistance  Bed to Chair: Minimal assistance;Contact guard assistance  Stand Pivot Transfers: Contact guard assistance  Ambulation  Ambulation?: Yes  Ambulation 1  Surface: level tile  Device: Rolling Walker  Assistance: Minimal assistance;Contact guard assistance  Quality of Gait: slow tawanda with step to pattern, flat foot LE at contact, decreased stance on L LE; downward gaze and cervical flexion  Gait Deviations: Decreased step length;Decreased step height;Shuffles; Slow Tawanda  Distance: 194ft  Comments: Pt is min A for safety due to compensation of  LLE and poor endurance. Pt has decreased step length and height with intermittent shuffling gait. Pt needs increase time for amb due to slow tawanda. Stairs/Curb  Stairs?: Yes  Stairs  # Steps : 10  Stairs Height:  (4\"/6\")  Rails: Bilateral  Device: No Device  Assistance: Contact guard assistance  Wheelchair Activities  Propulsion: Yes  Propulsion 1  Propulsion: Manual  Level: Level Tile  Method: RUE;LUE  Level of Assistance: Modified independent  Distance: 58ft        Other exercises  Other exercises?: Yes  Other exercises 1: supine B LE exercises x20 reps   Other exercises 2: Seated Ex: BLE x20 reps with #2 and green tband   Other exercises 3: bed mobility x2   Other exercises 4: NuStep L1 x10 minutes   Other exercises 5: standing B LE exericses x10 reps perfromed in // bars      Goals  Short term goals  Time Frame for Short term goals: 1 week  Short term goal 1: Pt to demostrate bed mobility CGA/Jennifer. Short term goal 2: Pt to perform transfers CGA. Short term goal 3: Pt to amb 100'-150' with device, CGA. Short term goal 4: Pt to improve BLE strength by 1/2 MMG. Short term goal 5: [facility]   Short term goal 6: Pt able to perform 4\" and 6\" steps x3 in //bars or acute stair way, with 2 B UE support, min A  Long term goals  Time Frame for Long term goals : By DC  Long term goal 1: Pt able to perform bed mobility at SBA  Long term goal 2: Pt able to transfer at supervsion level. Long term goal 3:  Pt able to ambulate house hold distances with assistive device mod-I  Long term goal 4:  Pt able to ambulate distance of 150 ft, level surfaces and shorter distance outside terraine at A. Long term goal 5: Pt able to propel w/c on level surface, distance of 150 ft, supervsion level. Long term goal 6: Improve 2MWT distance to atleast 120 ft to improve overall fucntion. Long term goal 7: Pt to improve Tinetti balance score to atlest 20/28 to reduce fall risk. Long term goal 8: Pt donny to perform cub step with B UE support and/or donny to goup and down 4 to 5 steps with 2 rails at min A   Patient Goals   Patient goals : To get stronger    Plan    Plan  Times per week: 1.5 hr/day, 5 to 7 days/week. Specific instructions for Next Treatment: transfers, amb, balance, standing program  Current Treatment Recommendations: Strengthening,Balance Training,Functional Mobility Training,Transfer Training,Endurance Training,Gait Training,Equipment Evaluation, Education, & procurement,Patient/Caregiver Education & Training,Safety Education & Training,Stair training,Wheelchair Mobility Training  Safety Devices  Type of devices:  All fall risk precautions in place,Call light within reach,Gait belt,Bed alarm in place,Patient at risk for falls,Nurse notified        03/27/22 1303 03/27/22 1446   PT Individual Minutes   Time In 8104 [id number]   Time Out 1206 1449   Minutes 55 37   PT Concurrent Minutes   Time In 1105  --    Time Out 1111  --    Minutes 6  --      Electronically signed by Sowmya Brandt PTA on 3/27/22 at 3:03 PM EDT

## 2022-03-27 NOTE — PLAN OF CARE
Problem: Skin Integrity:  Goal: Will show no infection signs and symptoms  Description: Will show no infection signs and symptoms  Outcome: Ongoing  Goal: Absence of new skin breakdown  Description: Absence of new skin breakdown  3/27/2022 1658 by Krystian Rudolph RN  Outcome: Ongoing  3/27/2022 0744 by Klarissa Nagel RN  Outcome: Ongoing  Note: Skin assessment completed this shift. Nutrition and Hydration status assessed with adequate intake. Roger Score as charted. Bilateral heels remain elevated on pillows throughout the shift. Patient able to reposition self for comfort and to prevent breakdown. Patient verbalizes understanding of pressure ulcer prevention measures. Skin integrity maintained. No new skin breakdown noted. Skin to high risk pressure areas including coccyx and heels are clear. Myah / Incontinence care provided as needed throughout the shift. PureWick placed at [facility] for stress incontinence. Problem: Falls - Risk of:  Goal: Will remain free from falls  Description: Will remain free from falls  3/27/2022 1658 by Krystian Rudolph RN  Outcome: Ongoing  3/27/2022 0744 by Klarissa Nagel RN  Outcome: Ongoing  Note: No falls or injuries sustained at this time. No attempts to get out of bed without nursing assistance. Call light within reach and pt. uses appropriately for assistance. Siderails up x 2. Nonskid footwear remains on. Bed in low and locked position. Hourly nursing rounds made. Pt. Alert and oriented, aware of limitations, and exhibits good safety judgement. Pt. uses assistive devices appropriately. Pt. understands individual fall risk factors. Pt. reoriented to surroundings and reminded to use call light with each nurse/patient interaction. Bed alarm / Personal alarm remains engaged throughout the shift as a precaution.      Goal: Absence of physical injury  Description: Absence of physical injury  Outcome: Ongoing     Problem: Pain:  Goal: Pain level will decrease  Description: Pain level will decrease  Outcome: Ongoing  Goal: Control of acute pain  Description: Control of acute pain  3/27/2022 1658 by Ginny Rodríguez RN  Outcome: Ongoing  3/27/2022 0744 by Sanjiv Arrieta RN  Outcome: Ongoing  Note: Pain assessment completed. Pt. able to rest.  Patient medicated with Ultram for pain this shift as ordered. Patient relates a tolerable level of discomfort with the current medication. Pt. Repositions per self for comfort. Nonverbal cues indicate pain relief. Pt. Rests quietly with eyes closed after pain medication administration. Respirations easy and unlabored. Appears free from distress.      Goal: Control of chronic pain  Description: Control of chronic pain  Outcome: Ongoing

## 2022-03-27 NOTE — PROGRESS NOTES
[facility] Internal Medicine    CONSULTATION / HISTORY AND PHYSICAL EXAMINATION            Date:   3/27/2022  Patient name:  Beth Salinas  Date of admission:  3/23/2022  4:52 PM  MRN:   [id number]  Account:  [de-identified]  YOB: 1950  PCP:    Dai Etienne MD  Room:   [facility]262-  Code Status:    Full Code    Physician Requesting Consult: Lazaro Granado MD    Reason for Consult:  Medical management    Chief Complaint:     No chief complaint on file. Debility    History Obtained From:     Patient, EMR, nursing staff    History of Present Illness:     78-year-old female initially presented to the multiple falls over several weeks in the setting of chronic cervical spine stenosis with myelopathy status post cervical fusion follows with neurosurgery  Recently started on Eliquis for acute DVT right leg  Trauma work-up CT brain in this admission unremarkable other than multiple remote and healing rib fractures, patient uses walker at home  Neurology review was obtained for worsening gait instability-MRI thoracic spine did show T2-T3 and T5-T7 moderate neural foraminal narrowing  Also patient was noted to have bilateral leg swelling with some stasis dermatitis, early cellulitis-started on diuresis as well as antibiotics  3/26   Patient feeling better  Feels that anxiety is better after starting buspar   Working with PT    Past Medical History:     Past Medical History:   Diagnosis Date    Allergic rhinitis, cause unspecified     Back pain     lumbar    Bowel obstruction ([address]     history of due to scar tissue, resolved non-surgically    C. difficile diarrhea     CAD (coronary artery disease)     no stent needed per pt.  Dr. Crystal William did cath at  2005    Cardiac murmur     Cellulitis     left leg    Cellulitis 2017 August    leg left leg/bug bite    Cerebral artery occlusion with cerebral infarction [facility])     TIA 2014    COVID-19     ONE YR AGO IN 4/25/2020 fever and cough    Diverticulosis of colon (without mention of hemorrhage)     GERD (gastroesophageal reflux disease)     GERD (gastroesophageal reflux disease)     on rx    History of blood transfusion     approx 2020        History of CHF (congestive heart failure)     History of MI (myocardial infarction) 2005    thought due to a blood clot    History of ovarian cyst 1970    had oopherectomy holly    History of peritonitis 1968    due to ruptured appendix age 13    HTN (hypertension)     Hx of blood clots     right leg    Hyperlipidemia     Intestinal or peritoneal adhesions with obstruction (postoperative) (postinfection) ([address]     Kidney infection     renal failure/sepsis/spider bite    Lateral epicondylitis  of elbow     MDRO (multiple drug resistant organisms) resistance     c diff    Muscle strain     right posterior shoulder    Other abnormal glucose     PONV (postoperative nausea and vomiting)     dry heaves    Pre-diabetes     Restless legs syndrome (RLS)     Snores     no cpap    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)     TIA (transient ischemic attack) 2014    Uses walker     Vitamin D deficiency     Wears glasses     Wellness examination     last seen 2 weeks ago        Past Surgical History:     Past Surgical History:   Procedure Laterality Date    ABDOMEN SURGERY  1976    benign tumor removed near remaining ovary, 1.5 pounds    APPENDECTOMY  1968    appendix ruptured, developed peritonitis    BACK SURGERY      BUNIONECTOMY Left     along with calcium deposits removed   [address]  2005    negative    CERVICAL FUSION  05/21/2021    POSTERIOR C3-6 LAMINECTOMY, PARTIAL C7 LAMINECTOMY, FUSION C3-C6, SILVERCORD    CERVICAL FUSION N/A 5/21/2021    POSTERIOR C3-6 LAMINECTOMY, PARTIAL C7 LAMINECTOMY, FUSION C3-C6, SILVERCORD performed by Juanita Mendoza DO at [address]    12 INCHES REMOVED D/T OBSTRUCTION  COLONOSCOPY      CYST REMOVAL Right     right facial    HYSTERECTOMY  1973    taken as a result of recurring cysts    LUMBAR FUSION N/A 02/10/2020    LUMBAR L4-5 POSTERIOR  DECOMPRESSION INSTRUMENTATION FUSION WCEMENT AUGMENTATION/ [facility] N/A 06/17/2020    L5-S1 PLIF L4-L5 REVISION performed by Martha Torres MD at [address] [place]  08/14/2014    FESS    OVARY REMOVAL  1970    UNILATERAL due to cyst    OVARY REMOVAL  1971    partial, due to cyst    SINUS SURGERY  2004    UPPER GASTROINTESTINAL ENDOSCOPY N/A 05/31/2019    EGD ESOPHAGOGASTRODUODENOSCOPY performed by Pascale Felton MD at [address] [place] [address] N/A 08/05/2019    EGD BIOPSY performed by Naren Hooker MD at [address] [place] [address] N/A 08/23/2019    EGD BIOPSY performed by Pascale Felton MD at [address] [facility] 03/05/2019    WRIST OPEN REDUCTION INTERNAL FIXATION performed by Anupam Stern MD at [address]        Medications Prior to Admission:     Prior to Admission medications    Medication Sig Start Date End Date Taking?  Authorizing Provider   amLODIPine (NORVASC) 10 MG tablet Take 1 tablet by mouth daily 3/10/22   Guerda Crandall MD   furosemide (LASIX) 40 MG tablet Take 1 tablet by mouth 2 times daily 3/1/22   MAIK Garcia CNP   carvedilol (COREG) 12.5 MG tablet Take 1 tablet by mouth 2 times daily 2/23/22   MAIK Garcia CNP   apixaban (ELIQUIS) 5 MG TABS tablet Please take 2 tablets by mouth for the first week then take 1 tablet by mouth BID for acute DVT  Patient taking differently: Take 5 mg by mouth 2 times daily take 1 tablet by mouth BID for acute DVT 2/17/22   MAIK Garcia CNP   atorvastatin (LIPITOR) 80 MG tablet Take 0.5 tablets by mouth nightly 2/1/22   MAIK Garcia CNP   lisinopril (PRINIVIL;ZESTRIL) 30 MG tablet Take 1 tablet by mouth daily 1/21/22   Jeimy Champion MD   fenofibrate micronized (LOFIBRA) 134 MG capsule Take 1 capsule by mouth every morning (before breakfast) 1/13/22   Jeimy Champion MD   pramipexole (MIRAPEX) 0.5 MG tablet Take 1 tablet by mouth nightly 1/6/22   Jeimy Champion MD   citalopram (CELEXA) 20 MG tablet Take 1 tablet by mouth daily 1/3/22   Chyna Perez MD   nitroGLYCERIN (NITROSTAT) 0.4 MG SL tablet Place 1 tablet under the tongue every 5 minutes as needed for Chest pain 9/1/21   Jeimy Champion MD   docusate sodium (COLACE) 100 MG capsule Take 1 capsule by mouth 2 times daily as needed for Constipation 5/30/21   Taiwo Marley,    diphenhydrAMINE HCl (BENADRYL ALLERGY PO) Take 1 capsule by mouth daily Takes q HS    Historical Provider, MD   cyanocobalamin ([employer] VITAMIN B12) 1000 MCG tablet Take 1 tablet by mouth daily 12/3/20   Jeimy Champion MD   ferrous sulfate (IRON 325) 325 (65 Fe) MG tablet Take 1 tablet by mouth 2 times daily 7/2/20   Keanu Maher MD   VITAMIN D, ERGOCALCIFEROL, PO Take by mouth    Historical Provider, MD   Lancets MISC 1 each by Does not apply route daily 10/10/19   Uriel Latham MD   blood glucose monitor strips Test 2 times a day & as needed for symptoms of irregular blood glucose. 10/10/19   Uriel Latham MD   Calcium Carbonate-Vitamin D (CALCIUM 500/D) 500-125 MG-UNIT TABS Take 1 tablet by mouth daily     Historical Provider, MD        Allergies:     Bactrim [sulfamethoxazole-trimethoprim], Codeine, and Seasonal    Social History:     Tobacco:    reports that she quit smoking about 4 years ago. Her smoking use included cigarettes. She started smoking about 26 years ago. She has a 10.00 pack-year smoking history. She has never used smokeless tobacco.  Alcohol:      reports no history of alcohol use. Drug Use:  reports no history of drug use.     Family History:     Family History   Problem Relation Age of Onset    Stroke Mother     Diabetes Mother     Heart Disease Mother     High Blood Pressure Mother     Heart Disease Father     Heart Disease Brother     High Blood Pressure Brother     Heart Disease Maternal Grandmother     High Blood Pressure Sister        Review of Systems:     Positive and Negative as described in HPI. CONSTITUTIONAL:  negative for fevers, chills, sweats, fatigue, weight loss  HEENT:  negative for vision, hearing changes, runny nose, throat pain  RESPIRATORY:  negative for shortness of breath, cough, congestion, wheezing. CARDIOVASCULAR:  negative for chest pain, palpitations. GASTROINTESTINAL:  negative for nausea, vomiting, diarrhea, constipation, change in bowel habits, abdominal pain   GENITOURINARY:  negative for difficulty of urination, burning with urination, frequency   INTEGUMENT:  negative for rash, skin lesions, easy bruising   HEMATOLOGIC/LYMPHATIC:  negative for swelling/edema   ALLERGIC/IMMUNOLOGIC:  negative for urticaria , itching  ENDOCRINE:  negative increase in drinking, increase in urination, hot or cold intolerance  MUSCULOSKELETAL:  negative joint pains, muscle aches, swelling of joints  NEUROLOGICAL:  negative for headaches, dizziness, lightheadedness, numbness, pain, tingling extremities      Physical Exam:     BP (!) 96/54   Pulse 66   Temp 98.3 °F (36.8 °C) (Oral)   Resp 18   Ht 5' 3\" (1.6 m)   Wt 180 lb (81.6 kg)   SpO2 93%   BMI 31.89 kg/m²   Temp (24hrs), Av.3 °F (36.8 °C), Min:98.3 °F (36.8 °C), Max:98.3 °F (36.8 °C)    No results for input(s): POCGLU in the last 72 hours. Intake/Output Summary (Last 24 hours) at 3/27/2022 1542  Last data filed at 3/27/2022 0515  Gross per 24 hour   Intake --   Output 300 ml   Net -300 ml       General Appearance:  alert, well appearing, and in no acute distress  Head:  normocephalic, atraumatic.   Eye: no icterus, redness, pupils equal and reactive, extraocular eye movements intact, conjunctiva clear  Ear: normal external ear, no discharge, hearing intact  Nose:  no drainage noted  Mouth: mucous membranes moist  Neck: supple, no carotid bruits, thyroid not palpable  Lungs: Bilateral equal air entry, clear to ausculation, no wheezing, rales or rhonchi, normal effort  Cardiovascular: normal rate, regular rhythm, no murmur, gallop, rub. Abdomen: Soft, nontender, nondistended, normal bowel sounds, no hepatomegaly or splenomegaly  Neurologic: There are no new focal motor or sensory deficits, normal muscle tone and bulk, no abnormal sensation, normal speech, cranial nerves II through XII grossly intact  Skin: No gross lesions, rashes, bruising or bleeding on exposed skin area  Extremities:  peripheral pulses palpable, no pedal edema or calf pain with palpation  Psych: normal affect    Investigations:      Laboratory Testing:  No results found for this or any previous visit (from the past 24 hour(s)).     Imaging/Diagonstics:  Recent data reviewed    Assessment :      Primary Problem  Cervical myelopathy [facility])    Active Hospital Problems    Diagnosis Date Noted    Cervical myelopathy ([facility] [G95.9] 03/17/2022       Plan:     Multiple falls, gait instability, multiple remote and healing rib fractures leading to chest pain-needs PT OT  Neural foraminal narrowing of thoracic spine-neurology as reviewed-recommend rehab and physical therapy  Hypertension-continue amlodipine, Coreg  Right leg DVT-continue Eliquis  Chronic diastolic CHF-currently compensated-continue Coreg, Lipitor, Lasix, lisinopril  Depression -patient noted to be very tearful today-is already on maximal dose of  Celexa, will add BuSpar    DVT prophylaxis-patient on Eliquis    3/27   But has readings of low blood pressure, asymptomatic  Reducing dose of Norvasc to 5 from 10  Consultations:   Luz Nolan  IP CONSULT TO INTERNAL MEDICINE      Luis Quiroz MD  3/27/2022  3:42 PM    Copy sent to Dr. Luis Quiroz MD    Please note that this chart was generated using voice recognition Dragon dictation software. Although every effort was made to ensure the accuracy of this automated transcription, some errors in transcription may have occurred.

## 2022-03-27 NOTE — PROGRESS NOTES
[address]    ACUTE REHABILITATION OCCUPATIONAL THERAPY  DAILY NOTE    Date: 3/27/22  Patient Name: Gin Montero      Room: 1845/8513-72    MRN: [id number]   : 1950  (71 y.o.)  Gender: female   Referring Practitioner: Catalina Griffith MD  Diagnosis: Cervical myelopathy  Additional Pertinent Hx: 71 y.o.  female, with a history of anemia, spondylolisthesis, chronic DVT, chronic diastolic heart failure, CVA, major depressive disorder, s/p cervical spine fusion, stenosis of cervical spine with myelopathy, and DM type II, who presents with leg pain, shortness of breath, fall, and neck pain. According to patient and her , patient has had multiple falls recently including a fall Monday and evening and again on Tuesday. Patient reports that today she felt extremely weak upon waking. Restrictions  Restrictions/Precautions: General Precautions,Fall Risk  Implants present? : Metal implants (Loop recorder)  Other position/activity restrictions: up as tolerated  Required Braces or Orthoses?: No  Equipment Used: Other (RW)    Subjective  Subjective: \"Oh I got this cough\" I didn't sleep well last night\". Patient Currently in Pain: Yes  Pain Level: 8  Pain Location: Chest;Neck  Pain Orientation: Mid  Restrictions/Precautions: General Precautions; Fall Risk  Overall Orientation Status: Within Functional Limits     Pain Assessment  Pain Assessment: 0-10  Pain Level: 8  Pain Type: Acute pain  Pain Location: Chest,Neck  Pain Orientation: Mid    Objective  Cognition  Overall Cognitive Status: WFL  Perception  Overall Perceptual Status: WFL  Balance  Sitting Balance: Supervision  Standing Balance: Minimal assistance  Transfers  Stand Pivot Transfers: Minimal assistance  Sit to stand: Moderate assistance ( from recliner. CGA w/c level)  Stand to sit: Minimal assistance  Transfer Comments: 1-2 UE support on grab bars; VC's for hand placement; L foot blocking due to sliding.  Sliding improved with shoes on feet rather than  socks. Standing Balance  Time: 30-60 sec X3  Activity: functional transfers, ADLs  Comment: 1-2 UE support; grab bars/RW  Toilet Transfers  Toilet - Technique: Stand pivot  Equipment Used: Grab bars  Toilet Transfer: Minimal assistance  Toilet Transfers Comments: VC's for hand placement/technique   Fine Motor: Pt instruction in [facility] and instrinsic hand strengthening for ease and safety with ADLs. Pt provided with handout for use with theraputty. Pt demos with good carryover of exercises. Next pt places small pins in slots and manipulating dice and cards. Reports increased cervical pain limits activity tolerance. ADL  Grooming: Setup (of items within reach at sink level)  UE Bathing: None (not completed this date)  LE Bathing: None (not completed this date)  UE Dressing: Stand by assistance (doffing/donning [place] shirt)  LE Dressing: Minimal assistance (A threading BLEs, pulling over hips)  Toileting: Minimal assistance; Increased time to complete (A with LB clothing mgmt)  Additional Comments: QUINONES facilitated pt in ADLs at the sink this AM. Dycem placed at feet and shoes donned for stability with all transfers. Assist provided for threading BLES from toilet level. Assessment  Performance deficits / Impairments: Decreased functional mobility ; Decreased ADL status; Decreased strength;Decreased endurance;Decreased sensation;Decreased balance;Decreased high-level IADLs;Decreased fine motor control;Decreased coordination  Prognosis: Good  Discharge Recommendations: Patient would benefit from continued therapy after discharge  Activity Tolerance: Patient Tolerated treatment well  Safety Devices in place: Yes  Type of devices: Left in chair;Patient at risk for falls;Gait belt  Restraints  Initially in place: No  Equipment Recommendations  Equipment Needed:  (TBD)       Patient Education: OT POC, safety and sequencing with functional transfers, modified techniques for ADLs, [facility] and hand strengthening. Patient Goals   Patient goals : \"To be able to put my pants/socks on better\"  Learner:patient  Method: explanation       Outcome: acknowledged understanding of         Plan  Plan  Times per week: 5-7  Times per day: Twice a day  Current Treatment Recommendations: Balance Training,Functional Mobility Training,Endurance Training,Strengthening,ROM,Safety Education & Training,Patient/Caregiver Education & Training,Equipment Evaluation, Education, & procurement,Self-Care / Judye Gravely Management  Patient Goals   Patient goals :  \"To be able to put my pants/socks on better\"  Short term goals  Time Frame for Short term goals: 1 week   Short term goal 1: Pt will complete LB dressing/bathing/toileting CGA with Good safety with use of AE/DME/modified techniques for increased IND with self care  Short term goal 2: Pt will participate in 30+ minutes functional activity for increased strength/balance/endurance for increased IND in ADL's  Short term goal 3: Pt will complete transfers/functional mobility CGA with Good safety and RW for increased IND in ADL's  Short term goal 4: Pt will verbalize/demonstrate Good understanding of AE/DME/modified techniques for increased IND in self care  Short term goal 5: Pt will complete UB bathing/dressing/grooming with Supervision for increased IND in self care  Long term goals  Time Frame for Long term goals : by discharge   Long term goal 1: Pt will complete toileting/grooming/dressing Mod I and bathing with Supervison with Good safety with use of AE/DME/modified techniques for increased IND with self care  Long term goal 2: Pt will complete functional mobility/transferes during functional activity Mod I with Good safety and RW for increased IND in ADL's  Long term goal 3: Pt will verbalize/demonstrate Good understanding of fall prevention strategies for increased safety/IND in self care  Long term goal 4: Pt will improve L hand strength for self care as evidence by a 3# improvement in dynomometor testing   Long term goal 5: Pt will improve L FMC as evidence by a 20 second improvement on 9 hole peg test for increased IND with self care  Timed Code Treatment Minutes: 28 Minutes     03/27/22 1406 03/27/22 1407   OT Individual Minutes   Time In [id number]   Time Out [id number]   [id number]     Electronically signed by PATRICIA Dawson on 3/27/22 at 2:07 PM EDT

## 2022-03-27 NOTE — PLAN OF CARE
Problem: Skin Integrity:  Goal: Absence of new skin breakdown  Description: Absence of new skin breakdown  Outcome: Ongoing  Note: Skin assessment completed this shift. Nutrition and Hydration status assessed with adequate intake. Roger Score as charted. Bilateral heels remain elevated on pillows throughout the shift. Patient able to reposition self for comfort and to prevent breakdown. Patient verbalizes understanding of pressure ulcer prevention measures. Skin integrity maintained. No new skin breakdown noted. Skin to high risk pressure areas including coccyx and heels are clear. Myah / Incontinence care provided as needed throughout the shift. PureWick placed at [facility] for stress incontinence. Problem: Falls - Risk of:  Goal: Will remain free from falls  Description: Will remain free from falls  Outcome: Ongoing  Note: No falls or injuries sustained at this time. No attempts to get out of bed without nursing assistance. Call light within reach and pt. uses appropriately for assistance. Siderails up x 2. Nonskid footwear remains on. Bed in low and locked position. Hourly nursing rounds made. Pt. Alert and oriented, aware of limitations, and exhibits good safety judgement. Pt. uses assistive devices appropriately. Pt. understands individual fall risk factors. Pt. reoriented to surroundings and reminded to use call light with each nurse/patient interaction. Bed alarm / Personal alarm remains engaged throughout the shift as a precaution. Problem: Pain:  Goal: Control of acute pain  Description: Control of acute pain  Outcome: Ongoing  Note: Pain assessment completed. Pt. able to rest.  Patient medicated with Ultram for pain this shift as ordered. Patient relates a tolerable level of discomfort with the current medication. Pt. Repositions per self for comfort. Nonverbal cues indicate pain relief. Pt. Rests quietly with eyes closed after pain medication administration.  Respirations easy and unlabored. Appears free from distress.

## 2022-03-28 LAB
ABSOLUTE EOS #: 0.15 K/UL (ref 0–0.4)
ABSOLUTE LYMPH #: 0.56 K/UL (ref 1–4.8)
ABSOLUTE MONO #: 0.26 K/UL (ref 0.1–1.3)
ANION GAP SERPL CALCULATED.3IONS-SCNC: 12 MMOL/L (ref 9–17)
BASOPHILS # BLD: 0 % (ref 0–2)
BASOPHILS ABSOLUTE: 0 K/UL (ref 0–0.2)
BUN BLDV-MCNC: 56 MG/DL (ref 8–23)
CALCIUM SERPL-MCNC: 8.9 MG/DL (ref 8.6–10.4)
CHLORIDE BLD-SCNC: 102 MMOL/L (ref 98–107)
CO2: 24 MMOL/L (ref 20–31)
CREAT SERPL-MCNC: 1.24 MG/DL (ref 0.5–0.9)
EOSINOPHILS RELATIVE PERCENT: 3 % (ref 0–4)
GFR AFRICAN AMERICAN: 52 ML/MIN
GFR NON-AFRICAN AMERICAN: 43 ML/MIN
GFR SERPL CREATININE-BSD FRML MDRD: ABNORMAL ML/MIN/{1.73_M2}
GLUCOSE BLD-MCNC: 107 MG/DL (ref 70–99)
HCT VFR BLD CALC: 30.6 % (ref 36–46)
HEMOGLOBIN: 10.2 G/DL (ref 12–16)
LYMPHOCYTES # BLD: 11 % (ref 24–44)
MCH RBC QN AUTO: 31.5 PG (ref 26–34)
MCHC RBC AUTO-ENTMCNC: 33.4 G/DL (ref 31–37)
MCV RBC AUTO: 94.4 FL (ref 80–100)
MONOCYTES # BLD: 5 % (ref 1–7)
MORPHOLOGY: ABNORMAL
MORPHOLOGY: ABNORMAL
PDW BLD-RTO: 13.8 % (ref 11.5–14.9)
PLATELET # BLD: 294 K/UL (ref 150–450)
PMV BLD AUTO: 7.9 FL (ref 6–12)
POTASSIUM SERPL-SCNC: 4.5 MMOL/L (ref 3.7–5.3)
RBC # BLD: 3.25 M/UL (ref 4–5.2)
SEG NEUTROPHILS: 81 % (ref 36–66)
SEGMENTED NEUTROPHILS ABSOLUTE COUNT: 4.13 K/UL (ref 1.3–9.1)
SODIUM BLD-SCNC: 138 MMOL/L (ref 135–144)
WBC # BLD: 5.1 K/UL (ref 3.5–11)

## 2022-03-28 PROCEDURE — 6370000000 HC RX 637 (ALT 250 FOR IP): Performed by: PHYSICAL MEDICINE & REHABILITATION

## 2022-03-28 PROCEDURE — 2500000003 HC RX 250 WO HCPCS: Performed by: PHYSICAL MEDICINE & REHABILITATION

## 2022-03-28 PROCEDURE — 97116 GAIT TRAINING THERAPY: CPT

## 2022-03-28 PROCEDURE — 36415 COLL VENOUS BLD VENIPUNCTURE: CPT

## 2022-03-28 PROCEDURE — 97530 THERAPEUTIC ACTIVITIES: CPT

## 2022-03-28 PROCEDURE — 6370000000 HC RX 637 (ALT 250 FOR IP): Performed by: NURSE PRACTITIONER

## 2022-03-28 PROCEDURE — 97110 THERAPEUTIC EXERCISES: CPT

## 2022-03-28 PROCEDURE — 80048 BASIC METABOLIC PNL TOTAL CA: CPT

## 2022-03-28 PROCEDURE — 6360000002 HC RX W HCPCS: Performed by: PHYSICAL MEDICINE & REHABILITATION

## 2022-03-28 PROCEDURE — 6370000000 HC RX 637 (ALT 250 FOR IP): Performed by: INTERNAL MEDICINE

## 2022-03-28 PROCEDURE — 1180000000 HC REHAB R&B

## 2022-03-28 PROCEDURE — 99232 SBSQ HOSP IP/OBS MODERATE 35: CPT | Performed by: INTERNAL MEDICINE

## 2022-03-28 PROCEDURE — 85025 COMPLETE CBC W/AUTO DIFF WBC: CPT

## 2022-03-28 PROCEDURE — 94640 AIRWAY INHALATION TREATMENT: CPT

## 2022-03-28 PROCEDURE — 94761 N-INVAS EAR/PLS OXIMETRY MLT: CPT

## 2022-03-28 PROCEDURE — 97535 SELF CARE MNGMENT TRAINING: CPT

## 2022-03-28 PROCEDURE — 99232 SBSQ HOSP IP/OBS MODERATE 35: CPT | Performed by: PHYSICAL MEDICINE & REHABILITATION

## 2022-03-28 RX ORDER — LOSARTAN POTASSIUM 100 MG/1
100 TABLET ORAL DAILY
Status: DISCONTINUED | OUTPATIENT
Start: 2022-03-29 | End: 2022-04-12 | Stop reason: HOSPADM

## 2022-03-28 RX ORDER — ALBUTEROL SULFATE 2.5 MG/3ML
2.5 SOLUTION RESPIRATORY (INHALATION) EVERY 6 HOURS PRN
Status: DISCONTINUED | OUTPATIENT
Start: 2022-03-28 | End: 2022-04-08

## 2022-03-28 RX ORDER — IPRATROPIUM BROMIDE AND ALBUTEROL SULFATE 2.5; .5 MG/3ML; MG/3ML
1 SOLUTION RESPIRATORY (INHALATION) 4 TIMES DAILY
Status: DISCONTINUED | OUTPATIENT
Start: 2022-03-28 | End: 2022-04-08

## 2022-03-28 RX ADMIN — TRAMADOL HYDROCHLORIDE 50 MG: 50 TABLET, COATED ORAL at 04:24

## 2022-03-28 RX ADMIN — ATORVASTATIN CALCIUM 40 MG: 40 TABLET, FILM COATED ORAL at 20:13

## 2022-03-28 RX ADMIN — BUSPIRONE HYDROCHLORIDE 5 MG: 5 TABLET ORAL at 14:59

## 2022-03-28 RX ADMIN — GABAPENTIN 200 MG: 100 CAPSULE ORAL at 20:13

## 2022-03-28 RX ADMIN — AMLODIPINE BESYLATE 5 MG: 5 TABLET ORAL at 08:55

## 2022-03-28 RX ADMIN — GABAPENTIN 200 MG: 100 CAPSULE ORAL at 08:53

## 2022-03-28 RX ADMIN — ALBUTEROL SULFATE 2.5 MG: 2.5 SOLUTION RESPIRATORY (INHALATION) at 13:54

## 2022-03-28 RX ADMIN — CALCIUM CARBONATE 600 MG (1,500 MG)-VITAMIN D3 400 UNIT TABLET 1 TABLET: at 08:54

## 2022-03-28 RX ADMIN — IPRATROPIUM BROMIDE AND ALBUTEROL SULFATE 1 AMPULE: 2.5; .5 SOLUTION RESPIRATORY (INHALATION) at 19:46

## 2022-03-28 RX ADMIN — CYANOCOBALAMIN TAB 1000 MCG 1000 MCG: 1000 TAB at 08:54

## 2022-03-28 RX ADMIN — GUAIFENESIN 100 MG: 200 SOLUTION ORAL at 01:05

## 2022-03-28 RX ADMIN — CARVEDILOL 12.5 MG: 12.5 TABLET, FILM COATED ORAL at 20:13

## 2022-03-28 RX ADMIN — CARVEDILOL 12.5 MG: 12.5 TABLET, FILM COATED ORAL at 08:54

## 2022-03-28 RX ADMIN — FUROSEMIDE 20 MG: 20 TABLET ORAL at 18:05

## 2022-03-28 RX ADMIN — BUSPIRONE HYDROCHLORIDE 5 MG: 5 TABLET ORAL at 08:55

## 2022-03-28 RX ADMIN — Medication 6 MG: at 20:14

## 2022-03-28 RX ADMIN — FERROUS SULFATE TAB 325 MG (65 MG ELEMENTAL FE) 325 MG: 325 (65 FE) TAB at 20:13

## 2022-03-28 RX ADMIN — APIXABAN 5 MG: 5 TABLET, FILM COATED ORAL at 20:13

## 2022-03-28 RX ADMIN — TRAMADOL HYDROCHLORIDE 50 MG: 50 TABLET, COATED ORAL at 13:45

## 2022-03-28 RX ADMIN — PRAMIPEXOLE DIHYDROCHLORIDE 0.5 MG: 0.25 TABLET ORAL at 20:13

## 2022-03-28 RX ADMIN — POLYETHYLENE GLYCOL 3350 17 G: 17 POWDER, FOR SOLUTION ORAL at 08:51

## 2022-03-28 RX ADMIN — TRAMADOL HYDROCHLORIDE 50 MG: 50 TABLET, COATED ORAL at 20:13

## 2022-03-28 RX ADMIN — FUROSEMIDE 20 MG: 20 TABLET ORAL at 08:55

## 2022-03-28 RX ADMIN — LISINOPRIL 30 MG: 20 TABLET ORAL at 08:53

## 2022-03-28 RX ADMIN — BUSPIRONE HYDROCHLORIDE 5 MG: 5 TABLET ORAL at 20:15

## 2022-03-28 RX ADMIN — CITALOPRAM HYDROBROMIDE 20 MG: 20 TABLET ORAL at 08:55

## 2022-03-28 RX ADMIN — FENOFIBRATE 160 MG: 160 TABLET ORAL at 08:56

## 2022-03-28 RX ADMIN — FERROUS SULFATE TAB 325 MG (65 MG ELEMENTAL FE) 325 MG: 325 (65 FE) TAB at 08:54

## 2022-03-28 RX ADMIN — APIXABAN 5 MG: 5 TABLET, FILM COATED ORAL at 08:54

## 2022-03-28 ASSESSMENT — PAIN SCALES - GENERAL
PAINLEVEL_OUTOF10: 8
PAINLEVEL_OUTOF10: 4
PAINLEVEL_OUTOF10: 10
PAINLEVEL_OUTOF10: 8
PAINLEVEL_OUTOF10: 6

## 2022-03-28 ASSESSMENT — PAIN DESCRIPTION - ORIENTATION
ORIENTATION: MID
ORIENTATION: MID

## 2022-03-28 ASSESSMENT — PAIN DESCRIPTION - PAIN TYPE
TYPE: ACUTE PAIN
TYPE: ACUTE PAIN

## 2022-03-28 ASSESSMENT — PAIN DESCRIPTION - FREQUENCY: FREQUENCY: INTERMITTENT

## 2022-03-28 ASSESSMENT — PAIN DESCRIPTION - LOCATION
LOCATION: RIB CAGE
LOCATION: RIB CAGE

## 2022-03-28 ASSESSMENT — 9 HOLE PEG TEST
TEST_RESULT: IMPAIRED
TESTTIME_SECONDS: 33
TESTTIME_SECONDS: 60
TEST_RESULT: IMPAIRED

## 2022-03-28 ASSESSMENT — PAIN DESCRIPTION - ONSET: ONSET: ON-GOING

## 2022-03-28 NOTE — PLAN OF CARE
Problem: Skin Integrity:  Goal: Will show no infection signs and symptoms  Description: Will show no infection signs and symptoms  3/28/2022 1138 by Chinmay Clayton LPN  Outcome: Ongoing  3/28/2022 0349 by Cathy Mace RN  Outcome: Ongoing  Goal: Absence of new skin breakdown  Description: Absence of new skin breakdown  3/28/2022 1138 by Chinmay Clayton LPN  Outcome: Ongoing  3/28/2022 0349 by Cathy Mace RN  Outcome: Ongoing     Problem: Falls - Risk of:  Goal: Will remain free from falls  Description: Will remain free from falls  3/28/2022 1138 by Chinmay Clayton LPN  Outcome: Ongoing  3/28/2022 0349 by Cathy aMce RN  Outcome: Ongoing  Goal: Absence of physical injury  Description: Absence of physical injury  3/28/2022 1138 by Chinmay Clayton LPN  Outcome: Ongoing  3/28/2022 0349 by Cathy Mace RN  Outcome: Ongoing     Problem: Pain:  Goal: Pain level will decrease  Description: Pain level will decrease  3/28/2022 1138 by hCinmay Clayton LPN  Outcome: Ongoing  3/28/2022 0349 by Cathy Mace RN  Outcome: Ongoing  Goal: Control of acute pain  Description: Control of acute pain  3/28/2022 1138 by Chinmay Clayton LPN  Outcome: Ongoing  3/28/2022 0349 by Cathy Mace RN  Outcome: Ongoing  Goal: Control of chronic pain  Description: Control of chronic pain  3/28/2022 1138 by Chinmay Clayton LPN  Outcome: Ongoing  3/28/2022 0349 by Cathy Mace RN  Outcome: Ongoing     Problem: Mobility - Impaired:  Goal: Mobility will improve  Description: Mobility will improve  3/28/2022 1138 by Chinmay Clayton LPN  Outcome: Ongoing  3/28/2022 0349 by Cathy Mace RN  Outcome: Ongoing     Problem: Musculor/Skeletal Functional Status  Goal: Highest potential functional level  3/28/2022 1138 by Chinmay Clayton LPN  Outcome: Ongoing  3/28/2022 0349 by Cathy Mace RN  Outcome: Ongoing  Goal: Absence of falls  3/28/2022 1138 by Chinmay Clayton LPN  Outcome: Ongoing  3/28/2022 0349 by Cathy Mace, RN  Outcome: Ongoing     Problem: Musculor/Skeletal Functional Status  Goal: Highest potential functional level  3/28/2022 1138 by Elizabeth Ghosh LPN  Outcome: Ongoing  3/28/2022 0349 by Nick Enrique RN  Outcome: Ongoing  Goal: Absence of falls  3/28/2022 1138 by Elizabeth Ghosh LPN  Outcome: Ongoing  3/28/2022 0349 by Nick Enrique RN  Outcome: Ongoing     Problem: Nutrition  Goal: Optimal nutrition therapy  3/28/2022 1138 by Elizabeth Ghosh LPN  Outcome: Ongoing  3/28/2022 0349 by Nick Enrique RN  Outcome: Ongoing

## 2022-03-28 NOTE — PROGRESS NOTES
Bronchodilator Assessment     RR 16  Breath Sounds: expiratory wheeze  SPO2: 94  FiO2: 21      Bronchodilator assessment at level  3    []    Bronchodilator Assessment  BRONCHODILATOR ASSESSMENT SCORE  Score 0 1 2 3 4 5   Breath Sounds   []  Patient Baseline []  No Wheeze good aeration []  Faint, scattered wheezing, good aeration [x]  Expiratory Wheezing and or moderately diminished []  Insp/Exp wheeze and/or very diminished []  Insp/Exp and/ or marked distress   Respiratory Rate   []  Patient Baseline []  Less than 20 [x]  Less than 20 []  20-25 []  Greater than 25 []  Greater than 25   Peak flow % of Pred or PB [x]  NA   []  Greater than 90%  []  81-90% []  71-80% []  Less than or equal to 70%  or unable to perform []  Unable due to Respiratory Distress   Dyspnea re []  Patient Baseline []  No SOB []  No SOB [x]  SOB on exertion []  SOB min activity []  At rest/acute   e FEV% Predicted       [x]  NA []  Above 69%  []  Unable []  Above 60-69%  []  Unable []  Above 50-59%  []  Unable []  Above 35-49%  []  Unable []  Less than 35%  []  Unable          FAB WHYTE RCP      1:59 PM

## 2022-03-28 NOTE — PROGRESS NOTES
[address] [facility]    ACUTE REHABILITATION OCCUPATIONAL THERAPY  DAILY NOTE    Date: 3/28/22  Patient Name: Raphael Hernandez      Room: 5457/9290-20    MRN: [id number]   : 1950  (71 y.o.)  Gender: female   Referring Practitioner: Carie Conley MD  Diagnosis: Cervical myelopathy  Additional Pertinent Hx: 71 y.o.  female, with a history of anemia, spondylolisthesis, chronic DVT, chronic diastolic heart failure, CVA, major depressive disorder, s/p cervical spine fusion, stenosis of cervical spine with myelopathy, and DM type II, who presents with leg pain, shortness of breath, fall, and neck pain. According to patient and her , patient has had multiple falls recently including a fall Monday and evening and again on Tuesday. Patient reports that today she felt extremely weak upon waking. Restrictions  Restrictions/Precautions: General Precautions,Fall Risk  Implants present? : Metal implants ( Loop recorder)  Other position/activity restrictions: up as tolerated  Required Braces or Orthoses?: No  Equipment Used: Other (RW)    Subjective  Subjective: \"I have this dry cough and I'm starting to lose my voice\" Pt states regarding feeling ill this AM  Comments: Pt pleasent and agreeable for OT treatment this AM  Patient Currently in Pain: Yes  Pain Level: 8  Pain Location: Rib cage  Pain Orientation: Mid  Restrictions/Precautions: General Precautions; Fall Risk  Overall Orientation Status: Within Functional Limits  Patient Observation  Observations: LLE measured to be shorter than RLE.  LLE slipping upon standing requiring blocking for safety   Pain Assessment  Pain Assessment: 0-10  Pain Level: 8  Pain Type: Acute pain  Pain Location: Rib cage  Pain Orientation: Mid  Pain Descriptors: Sharp  Pain Frequency: Intermittent  Clinical Progression: Not changed    Objective  Cognition  Overall Cognitive Status: WFL  Perception  Overall Perceptual Status: WFL  Balance  Sitting Balance: Supervision  Standing Balance: Contact guard assistance  Bed mobility  Sit to Supine: Maximum assistance  Comment: Max A to return to bed for BLE management due to increased pain  Transfers  Stand Pivot Transfers: Maximum assistance (AM: Min A; PM: Max A)  Sit to stand: Minimal assistance  Stand to sit: Minimal assistance  Transfer Comments: 1-2 UE support; VC's for technique; Dysem placed under L foot and shoes on pt to prevent L foot slipping ; increased time to complete  Standing Balance  Time: ~1-2 minutes  Activity: transfers/self care  Comment: 1-2 UE support   Functional Mobility  Functional - Mobility Device: Wheelchair  Activity: To/from bathroom  Assist Level: Minimal assistance  Functional Mobility Comments: able to self propell to/from bathroom; assist required to place wheelchair up ramp to shower  Shower Transfers  Shower - Transfer From: Wheelchair  Shower - Transfer Type: To and From  Shower - Transfer To: Shower seat with back  Shower - Technique: Stand pivot  Shower Transfers: Minimal assistance  Shower Transfers Comments: 1-2 UE support; VC's for technique; Dysem placed under L foot and shoes on pt to prevent L foot slipping ; increased time to complete     Exercises  Grasp/Release: Pt engaged in LUE hand strengthening activity for increased strength for ADL's; Black/red/yellow clips added to horizontal bars- pt stops activity stating increased 10/10 pain with reaching to complete activity-activty ceased. Other: Pt rolled velcrow handles across board using BUE for strengthening of B wrists/hands for self care                       ADL  Equipment Provided: Reacher;Long-handled sponge (Dysem)  Feeding: Setup; Increased time to complete (pt reports diffulty keeping food on utensils )  Grooming: Setup (seated in wheelchair at sink )  UE Bathing: Supervision  LE Bathing: Supervision  UE Dressing: Stand by assistance  LE Dressing: Stand by assistance  Additional Comments: Pt SBA for functional mobility to/from bathroom with increased time. Pt required assistance to place wheelchair in shower to transfer to/from tub bench. Min A and VC's for transfers with Dysem and shoes utilized to address L foot slipping with sucess. Pt completed bathing tasks with Supervision utilizing weight shifting technique while seated in shower. Pt SBA for dressing with VC's on use of AE for LB dressing and dysem used to don shoes. Pt completed grooming at sink with Set up assistance. No LOB throughout self care this AM. PM: Pt observed to have increasingly labored breathing with wheezing sounds. SpO2 93%, MEENU Arreguin notified. Per MEENU Arreguin, Respitory therapy contacted. RN okayed pt to participate in therapy if remaining in room. Pt engaged in hand strengthening activites while seated in recliner for ~10 minutes before stating pain increased to 10/10 as pt becomes tearful. pt observed to clench chest with towel for support as coughing increases. Pt requesting to return to bed. Pt transfered Max A with RW/Dysem under L foot to bed. Max A sit<>supine for BLE management. MEENU duran/CUCO Evans notified of pt report. Assessment  Performance deficits / Impairments: Decreased functional mobility ; Decreased ADL status; Decreased strength;Decreased endurance;Decreased sensation;Decreased balance;Decreased high-level IADLs;Decreased fine motor control;Decreased coordination  Prognosis: Good  Discharge Recommendations: Patient would benefit from continued therapy after discharge  Activity Tolerance: Patient Tolerated treatment well  Safety Devices in place: Yes  Type of devices: Gait belt;Patient at risk for falls; Left in chair;Call light within reach; Left in bed;Nurse notified (AM: in therapy gym; PM: in bed )  Restraints  Initially in place: No          Patient Education:  Safety, modified techniques, use of AE  Patient Goals   Patient goals :  \"To be able to put my pants/socks on better\"  Learner:patient  Method: demonstration and explanation       Outcome: demonstrated understanding        Plan  Plan  Times per week: 5-7  Times per day: Twice a day  Current Treatment Recommendations: Balance Training,Functional Mobility Training,Endurance Training,Strengthening,ROM,Safety Education & Training,Patient/Caregiver Education & Training,Equipment Evaluation, Education, & procurement,Self-Care / ADL,Pain Management  Patient Goals   Patient goals :  \"To be able to put my pants/socks on better\"  Short term goals  Time Frame for Short term goals: 1 week   Short term goal 1: Pt will complete LB dressing/bathing/toileting CGA with Good safety with use of AE/DME/modified techniques for increased IND with self care  Short term goal 2: Pt will participate in 30+ minutes functional activity for increased strength/balance/endurance for increased IND in ADL's  Short term goal 3: Pt will complete transfers/functional mobility CGA with Good safety and RW for increased IND in ADL's  Short term goal 4: Pt will verbalize/demonstrate Good understanding of AE/DME/modified techniques for increased IND in self care  Short term goal 5: Pt will complete UB bathing/dressing/grooming with Supervision for increased IND in self care  Long term goals  Time Frame for Long term goals : by discharge   Long term goal 1: Pt will complete toileting/grooming/dressing Mod I and bathing with Supervison with Good safety with use of AE/DME/modified techniques for increased IND with self care  Long term goal 2: Pt will complete functional mobility/transferes during functional activity Mod I with Good safety and RW for increased IND in ADL's  Long term goal 3: Pt will verbalize/demonstrate Good understanding of fall prevention strategies for increased safety/IND in self care  Long term goal 4: Pt will improve L hand strength for self care as evidence by a 3# improvement in dynomometor testing   Long term goal 5: Pt will improve L FMC as evidence by a 20 second improvement on 9 hole peg test for increased IND with self care     03/28/22 1004 03/28/22 1302   OT Individual Minutes   Time In 1004 1302   Time Out 1077 [id number]   Minutes 59 36   Time Code Minutes    Timed Code Treatment Minutes 59 Minutes 36 Minutes     Electronically signed by Allean Harada, S/OT on 3/28/22 at 3:39 PM EDT

## 2022-03-28 NOTE — PROGRESS NOTES
Telephone call to Dr. Gin Leone to request respiratory treatments RE: Pt has congested cough, no audible wheezing but expiratory wheezing auscultated in upper lobes A&P. O2 Sat 93% on room air, having SOB with minimal exertion. Order received for RT eval and treat. Dr. Matthew Parks notified of pt status and order for RT.

## 2022-03-28 NOTE — PROGRESS NOTES
Former[facility] Internal Medicine    CONSULTATION / HISTORY AND PHYSICAL EXAMINATION            Date:   3/28/2022  Patient name:  Coretta Alston  Date of admission:  3/23/2022  4:52 PM  MRN:   [id number]  Account:  [de-identified]  YOB: 1950  PCP:    Galen Titus MD  Room:   [facility]-  Code Status:    Full Code    Physician Requesting Consult: Puneet Valerio MD    Reason for Consult:  Medical management    Chief Complaint:     No chief complaint on file. Debility    History Obtained From:     Patient, EMR, nursing staff    History of Present Illness:     78-year-old female initially presented to the multiple falls over several weeks in the setting of chronic cervical spine stenosis with myelopathy status post cervical fusion follows with neurosurgery  Recently started on Eliquis for acute DVT right leg  Trauma work-up CT brain in this admission unremarkable other than multiple remote and healing rib fractures, patient uses walker at home  Neurology review was obtained for worsening gait instability-MRI thoracic spine did show T2-T3 and T5-T7 moderate neural foraminal narrowing  Also patient was noted to have bilateral leg swelling with some stasis dermatitis, early cellulitis-started on diuresis as well as antibiotics  3/26   Patient feeling better  Feels that anxiety is better after starting buspar   Working with PT  3/28 ]  Patient complaining of cough, wheezing  She told the nurse that, cough is going on since she started lisinopril  Had wheezing, which improved with nebulization    Past Medical History:     Past Medical History:   Diagnosis Date    Allergic rhinitis, cause unspecified     Back pain     lumbar    Bowel obstruction (HCC)     history of due to scar tissue, resolved non-surgically    C. difficile diarrhea     CAD (coronary artery disease)     no stent needed per pt.  Dr. Lolita Clark did cath at  2005    Cardiac murmur     Cellulitis     left leg  Cellulitis 2017 August    leg left leg/bug bite    Cerebral artery occlusion with cerebral infarction [facility])     TIA 2014    COVID-19     ONE YR AGO IN 4/25/2020 fever and cough    Diverticulosis of colon (without mention of hemorrhage)     GERD (gastroesophageal reflux disease)     GERD (gastroesophageal reflux disease)     on rx    History of blood transfusion     approx 2020        History of CHF (congestive heart failure)     History of MI (myocardial infarction) 2005    thought due to a blood clot    History of ovarian cyst 1970    had oopherectomy holly    History of peritonitis 1968    due to ruptured appendix age 13    HTN (hypertension)     Hx of blood clots     right leg    Hyperlipidemia     Intestinal or peritoneal adhesions with obstruction (postoperative) (postinfection) ([facility] [address]     Kidney infection     renal failure/sepsis/spider bite    Lateral epicondylitis  of elbow     MDRO (multiple drug resistant organisms) resistance     c diff    Muscle strain     right posterior shoulder    Other abnormal glucose     PONV (postoperative nausea and vomiting)     dry heaves    Pre-diabetes     Restless legs syndrome (RLS)     Snores     no cpap    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)     TIA (transient ischemic attack) 2014    Uses walker     Vitamin D deficiency     Wears glasses     Wellness examination     last seen 2 weeks ago        Past Surgical History:     Past Surgical History:   Procedure Laterality Date    ABDOMEN SURGERY  1976    benign tumor removed near remaining ovary, 1.5 pounds    APPENDECTOMY  1968    appendix ruptured, developed peritonitis    BACK SURGERY      BUNIONECTOMY Left     along with calcium deposits removed   [address]  2005    negative    CERVICAL FUSION  05/21/2021    POSTERIOR C3-6 LAMINECTOMY, PARTIAL C7 LAMINECTOMY, FUSION C3-C6, SILVERCORD    CERVICAL FUSION N/A 5/21/2021    POSTERIOR C3-6 LAMINECTOMY, PARTIAL C7 LAMINECTOMY, FUSION C3-C6, SILVERCORD performed by Naveen Connolly DO at [address]    12 INCHES REMOVED D/T OBSTRUCTION    COLONOSCOPY      CYST REMOVAL Right     right facial    HYSTERECTOMY  1973    taken as a result of recurring cysts    LUMBAR FUSION N/A 02/10/2020    LUMBAR L4-5 POSTERIOR  DECOMPRESSION INSTRUMENTATION FUSION WCEMENT AUGMENTATION/ performed by Talia Pradhan MD at [facility] N/A 06/17/2020    L5-S1 PLIF L4-L5 REVISION performed by Talia Pradhan MD at [address],[facility] [address]  08/14/2014    FESS    OVARY REMOVAL  1970    UNILATERAL due to cyst    OVARY REMOVAL  1971    partial, due to cyst    SINUS SURGERY  2004    UPPER GASTROINTESTINAL ENDOSCOPY N/A 05/31/2019    EGD ESOPHAGOGASTRODUODENOSCOPY performed by Tamiko Ledbetter MD at [address] N/A 08/05/2019    EGD BIOPSY performed by Travis Villatoro MD at [address] [facility] N/A 08/23/2019    EGD BIOPSY performed by Tamiko Ledbetter MD at [address] [facility] 03/05/2019    WRIST OPEN REDUCTION INTERNAL FIXATION performed by Amairani Aparicio MD at [address]        Medications Prior to Admission:     Prior to Admission medications    Medication Sig Start Date End Date Taking?  Authorizing Provider   amLODIPine (NORVASC) 10 MG tablet Take 1 tablet by mouth daily 3/10/22   Danii Lopes MD   furosemide (LASIX) 40 MG tablet Take 1 tablet by mouth 2 times daily 3/1/22   MAIK Davis CNP   carvedilol (COREG) 12.5 MG tablet Take 1 tablet by mouth 2 times daily 2/23/22   MAIK Davis CNP   apixaban (ELIQUIS) 5 MG TABS tablet Please take 2 tablets by mouth for the first week then take 1 tablet by mouth BID for acute DVT  Patient taking differently: Take 5 mg by mouth 2 times daily take 1 tablet by mouth BID for acute DVT 2/17/22   Jeri Tijerina Da Núñez, APRN - CNP   atorvastatin (LIPITOR) 80 MG tablet Take 0.5 tablets by mouth nightly 2/1/22   Mandysage DueñasMAIK - CNP   lisinopril (PRINIVIL;ZESTRIL) 30 MG tablet Take 1 tablet by mouth daily 1/21/22   Maria Del Carmen Farfan MD   fenofibrate micronized (LOFIBRA) 134 MG capsule Take 1 capsule by mouth every morning (before breakfast) 1/13/22   Maria Del Carmen Farfan MD   pramipexole (MIRAPEX) 0.5 MG tablet Take 1 tablet by mouth nightly 1/6/22   Maria Del Carmen Farfan MD   citalopram (CELEXA) 20 MG tablet Take 1 tablet by mouth daily 1/3/22   Becca Peña MD   nitroGLYCERIN (NITROSTAT) 0.4 MG SL tablet Place 1 tablet under the tongue every 5 minutes as needed for Chest pain 9/1/21   Maria Del Carmen Farfan MD   docusate sodium (COLACE) 100 MG capsule Take 1 capsule by mouth 2 times daily as needed for Constipation 5/30/21   Vernon Her Marley, DO   diphenhydrAMINE HCl (BENADRYL ALLERGY PO) Take 1 capsule by mouth daily Takes q HS    Historical Provider, MD   cyanocobalamin ([employer] VITAMIN B12) 1000 MCG tablet Take 1 tablet by mouth daily 12/3/20   Maria Del Carmen Farfan MD   ferrous sulfate (IRON 325) 325 (65 Fe) MG tablet Take 1 tablet by mouth 2 times daily 7/2/20   Micki Torres MD   VITAMIN D, ERGOCALCIFEROL, PO Take by mouth    Historical Provider, MD   Lancets MISC 1 each by Does not apply route daily 10/10/19   Ryan Olson MD   blood glucose monitor strips Test 2 times a day & as needed for symptoms of irregular blood glucose. 10/10/19   Ryan Olson MD   Calcium Carbonate-Vitamin D (CALCIUM 500/D) 500-125 MG-UNIT TABS Take 1 tablet by mouth daily     Historical Provider, MD        Allergies:     Bactrim [sulfamethoxazole-trimethoprim], Codeine, and Seasonal    Social History:     Tobacco:    reports that she quit smoking about 4 years ago. Her smoking use included cigarettes. She started smoking about 26 years ago. She has a 10.00 pack-year smoking history.  She has never used smokeless tobacco.  Alcohol:      reports no history of alcohol use. Drug Use:  reports no history of drug use. Family History:     Family History   Problem Relation Age of Onset    Stroke Mother     Diabetes Mother     Heart Disease Mother     High Blood Pressure Mother     Heart Disease Father     Heart Disease Brother     High Blood Pressure Brother     Heart Disease Maternal Grandmother     High Blood Pressure Sister        Review of Systems:     Positive and Negative as described in HPI. CONSTITUTIONAL:  negative for fevers, chills, sweats, fatigue, weight loss  HEENT:  negative for vision, hearing changes, runny nose, throat pain  RESPIRATORY:  negative for shortness of breath, cough, congestion, wheezing. CARDIOVASCULAR:  negative for chest pain, palpitations. GASTROINTESTINAL:  negative for nausea, vomiting, diarrhea, constipation, change in bowel habits, abdominal pain   GENITOURINARY:  negative for difficulty of urination, burning with urination, frequency   INTEGUMENT:  negative for rash, skin lesions, easy bruising   HEMATOLOGIC/LYMPHATIC:  negative for swelling/edema   ALLERGIC/IMMUNOLOGIC:  negative for urticaria , itching  ENDOCRINE:  negative increase in drinking, increase in urination, hot or cold intolerance  MUSCULOSKELETAL:  negative joint pains, muscle aches, swelling of joints  NEUROLOGICAL:  negative for headaches, dizziness, lightheadedness, numbness, pain, tingling extremities      Physical Exam:     BP (!) 146/67   Pulse 79   Temp 98.8 °F (37.1 °C)   Resp 16   Ht 5' 3\" (1.6 m)   Wt 180 lb (81.6 kg)   SpO2 94%   BMI 31.89 kg/m²   Temp (24hrs), Av.5 °F (36.9 °C), Min:98.1 °F (36.7 °C), Max:98.8 °F (37.1 °C)    No results for input(s): POCGLU in the last 72 hours.     Intake/Output Summary (Last 24 hours) at 3/28/2022 2079  Last data filed at 3/28/2022 0710  Gross per 24 hour   Intake 360 ml   Output 325 ml   Net 35 ml       General Appearance:  alert, well appearing, and in no acute distress  Head:  normocephalic, atraumatic. Eye: no icterus, redness, pupils equal and reactive, extraocular eye movements intact, conjunctiva clear  Ear: normal external ear, no discharge, hearing intact  Nose:  no drainage noted  Mouth: mucous membranes moist  Neck: supple, no carotid bruits, thyroid not palpable  Lungs: Bilateral equal air entry, clear to ausculation, no wheezing, rales or rhonchi, normal effort  Cardiovascular: normal rate, regular rhythm, no murmur, gallop, rub.   Abdomen: Soft, nontender, nondistended, normal bowel sounds, no hepatomegaly or splenomegaly  Neurologic: There are no new focal motor or sensory deficits, normal muscle tone and bulk, no abnormal sensation, normal speech, cranial nerves II through XII grossly intact  Skin: No gross lesions, rashes, bruising or bleeding on exposed skin area  Extremities:  peripheral pulses palpable, no pedal edema or calf pain with palpation  Psych: normal affect    Investigations:      Laboratory Testing:  Recent Results (from the past 24 hour(s))   Basic Metabolic Panel    Collection Time: 03/28/22  7:01 AM   Result Value Ref Range    Glucose 107 (H) 70 - 99 mg/dL    BUN 56 (H) 8 - 23 mg/dL    CREATININE 1.24 (H) 0.50 - 0.90 mg/dL    Calcium 8.9 8.6 - 10.4 mg/dL    Sodium 138 135 - 144 mmol/L    Potassium 4.5 3.7 - 5.3 mmol/L    Chloride 102 98 - 107 mmol/L    CO2 24 20 - 31 mmol/L    Anion Gap 12 9 - 17 mmol/L    GFR Non-African American 43 (L) >60 mL/min    GFR  52 (L) >60 mL/min    GFR Comment         CBC with Auto Differential    Collection Time: 03/28/22  7:01 AM   Result Value Ref Range    WBC 5.1 3.5 - 11.0 k/uL    RBC 3.25 (L) 4.0 - 5.2 m/uL    Hemoglobin 10.2 (L) 12.0 - 16.0 g/dL    Hematocrit 30.6 (L) 36 - 46 %    MCV 94.4 80 - 100 fL    MCH 31.5 26 - 34 pg    MCHC 33.4 31 - 37 g/dL    RDW 13.8 11.5 - 14.9 %    Platelets 931 067 - 387 k/uL    MPV 7.9 6.0 - 12.0 fL    Seg Neutrophils 81 (H) 36 - 66 %    Lymphocytes 11 (L) 24 - 44 %    Monocytes 5 1 - 7 % Eosinophils % 3 0 - 4 %    Basophils 0 0 - 2 %    Segs Absolute 4.13 1.3 - 9.1 k/uL    Absolute Lymph # 0.56 (L) 1.0 - 4.8 k/uL    Absolute Mono # 0.26 0.1 - 1.3 k/uL    Absolute Eos # 0.15 0.0 - 0.4 k/uL    Basophils Absolute 0.00 0.0 - 0.2 k/uL    Morphology HYPOCHROMIA PRESENT     Morphology ANISOCYTOSIS PRESENT        Imaging/Diagonstics:  Recent data reviewed    Assessment :      Primary Problem  Cervical myelopathy [facility])    Active Hospital Problems    Diagnosis Date Noted    Cervical myelopathy ([facility] [address] [G95.9] 03/17/2022       Plan:     Multiple falls, gait instability, multiple remote and healing rib fractures leading to chest pain-needs PT OT  Neural foraminal narrowing of thoracic spine-neurology as reviewed-recommend rehab and physical therapy  Hypertension-continue amlodipine, Coreg  Right leg DVT-continue Eliquis  Chronic diastolic CHF-currently compensated-continue Coreg, Lipitor, Lasix, lisinopril  Depression -patient noted to be very tearful today-is already on maximal dose of  Celexa, will add BuSpar    DVT prophylaxis-patient on Eliquis    3/27   But has readings of low blood pressure, asymptomatic  Reducing dose of Norvasc to 5 from 10    3/28   Patient has chronic cough, started since she is put on ACE inhibitor  May have ACE inhibitor induced cough, will discontinue lisinopril, start patient on Cozaar  Started patient given nebulization  Has history of smoking  Chest x-ray seen   Consultations:   IP CONSULT TO DIETITIAN  IP CONSULT TO SOCIAL WORK  IP CONSULT TO INTERNAL MEDICINE      Promise Song MD  3/28/2022  5:29 PM    Copy sent to Dr. Promise Song MD    Please note that this chart was generated using voice recognition Dragon dictation software. Although every effort was made to ensure the accuracy of this automated transcription, some errors in transcription may have occurred.

## 2022-03-28 NOTE — PROGRESS NOTES
Physical Medicine & Rehabilitation  Progress Note    3/28/2022 1:17 PM     CC: Ambulatory and ADL dysfunction due to neuropathy with ataxia    Subjective:   Continues with substernal discomfort, continued cough    ROS:  Denies fevers, chills, sweats. No chest pain, palpitations, lightheadedness. Denies coughing, wheezing or shortness of breath. Denies abdominal pain, nausea, diarrhea or constipation. No new areas of joint pain. Denies new areas of numbness or weakness. Denies new anxiety or depression issues. No new skin problems. Rehabilitation:   PT:  Restrictions/Precautions: General Precautions,Fall Risk  Implants present? : Metal implants ( Loop recorder)  Other position/activity restrictions: up as tolerated   Transfers  Sit to Stand: Minimal Assistance,Contact guard assistance  Stand to sit: Minimal Assistance,Contact guard assistance  Bed to Chair: Minimal assistance,Contact guard assistance  Stand Pivot Transfers: Contact guard assistance  Comment: mod A from bed, min A from chair, RW used for transfers. Ambulation 1  Surface: level tile  Device: Rolling Walker  Assistance: Minimal assistance,Contact guard assistance  Quality of Gait: slow tawanda with step to pattern, flat foot LE at contact, decreased stance on L LE; downward gaze and cervical flexion  Gait Deviations: Decreased step length,Decreased step height,Shuffles,Slow Tawanda  Distance: 194ft  Comments: Pt is min A for safety due to compensation of  LLE and poor endurance. Pt has decreased step length and height with intermittent shuffling gait. Pt needs increase time for amb due to slow tawanda.           OT:  ADL  Equipment Provided: Reacher,Long-handled sponge (Dysem)  Feeding: Setup,Increased time to complete (pt reports diffulty keeping food on utensils )  Grooming: Setup (seated in wheelchair at sink )  UE Bathing: Supervision  LE Bathing: Supervision  UE Dressing: Stand by assistance  LE Dressing: Stand by assistance  Toileting: Minimal assistance,Increased time to complete (A with LB clothing mgmt)  Additional Comments: Pt SBA for functional mobility to/from bathroom with increased time. Pt required assistance to place wheelchair in shower to transfer to/from tub bench. Min A and VC's for transfers with Dysem and shoes utilized to address L foot slipping with sucess. Pt completed bathing tasks with Supervision utilizing weight shifting technique while seated in shower. Pt SBA for dressing with VC's on use of AE for LB dressing and dysem used to don shoes. Pt completed grooming at sink with Set up assistance. No LOB throughout self care this AM         Balance  Sitting Balance: Supervision  Standing Balance: Contact guard assistance   Standing Balance  Time: ~1-2 minutes  Activity: transfers/self care  Comment: 1-2 UE support   Functional Mobility  Functional - Mobility Device: Wheelchair  Activity: To/from bathroom  Assist Level: Minimal assistance  Functional Mobility Comments: able to self propell to/from bathroom; assist required to place wheelchair up ramp to shower     Bed mobility  Rolling to Left: Minimal assistance  Rolling to Right: Minimal assistance  Supine to Sit: Stand by assistance  Sit to Supine: Moderate assistance (assisted with B LEs)  Scooting: Stand by assistance  Comment: performed on mat with wedge and 3 pillows; complained of increased chest pain with supine to sit transfer  Transfers  Stand Pivot Transfers: Minimal assistance  Sit to stand: Minimal assistance  Stand to sit: Minimal assistance  Transfer Comments: 1-2 UE support; VC's for technique; Dysem placed under L foot and shoes on pt to prevent L foot slipping ; increased time to complete   Toilet Transfers  Toilet - Technique: Stand pivot  Equipment Used: Grab bars  Toilet Transfer: Minimal assistance  Toilet Transfers Comments: VC's for hand placement/technique      Shower Transfers  Shower - Transfer From: Wheelchair  Shower - Transfer Type:  To and From  Shower - Transfer To: Shower seat with back  Shower - Technique: Stand pivot  Shower Transfers: Minimal assistance  Shower Transfers Comments: 1-2 UE support; VC's for technique; Dysem placed under L foot and shoes on pt to prevent L foot slipping ; increased time to complete  Wheelchair Bed Transfers  Wheelchair/Bed - Technique: Stand pivot  Equipment Used: Other (RW)  Level of Asssistance: Moderate assistance  Wheelchair Transfers Comments: with RW; Blocking of L foot due to slipping      ST:            Objective:  BP (!) 146/67   Pulse 79   Temp 98.8 °F (37.1 °C)   Resp 16   Ht 5' 3\" (1.6 m)   Wt 180 lb (81.6 kg)   SpO2 90%   BMI 31.89 kg/m²  I Body mass index is 31.89 kg/m². I   Wt Readings from Last 1 Encounters:   22 180 lb (81.6 kg)      Temp (24hrs), Av.5 °F (36.9 °C), Min:98.1 °F (36.7 °C), Max:98.8 °F (37.1 °C)         GEN: well developed, well nourished, no acute distress  HEENT: Normocephalic atraumatic, EOMI, mucous membranes pink and moist  CV: RRR, no murmurs, rubs or gallops  PULM: CTAB, no rales or rhonchi wheezes or crackles noted. Respirations WNL and unlabored, palpation sternal area reproduces sternal discomfort  ABD: soft, NT, ND, +BS and equal  NEURO: A&O x3. Sensation intact to light touch. MSK: At least antigravity to 4 -/5 strength  EXTREMITIES: No calf tenderness to palpation bilaterally. No edema BLEs  SKIN: warm dry and intact with good turgor  PSYCH: appropriately interactive. Affect WNL.           Medications   Scheduled Meds:   amLODIPine  5 mg Oral Daily    gabapentin  200 mg Oral BID    lidocaine  1 patch TransDERmal Daily    busPIRone  5 mg Oral TID    apixaban  5 mg Oral BID    atorvastatin  40 mg Oral Nightly    calcium carbonate w/vitamin D  1 tablet Oral Daily    carvedilol  12.5 mg Oral BID    citalopram  20 mg Oral Daily    fenofibrate  160 mg Oral Daily    ferrous sulfate  325 mg Oral BID    furosemide  20 mg Oral BID    lisinopril  30 mg Oral Daily    pramipexole  0.5 mg Oral Nightly    cyanocobalamin  1,000 mcg Oral Daily    polyethylene glycol  17 g Oral Daily     Continuous Infusions:  PRN Meds:.guaiFENesin, traMADol, melatonin, magnesium hydroxide, docusate sodium, acetaminophen, senna, bisacodyl     Diagnostics:     CBC:   No results for input(s): WBC, RBC, HGB, HCT, MCV, RDW, PLT in the last 72 hours. BMP:   Recent Labs     03/28/22  0701      K 4.5      CO2 24   BUN 56*   CREATININE 1.24*     BNP: No results for input(s): BNP in the last 72 hours. PT/INR: No results for input(s): PROTIME, INR in the last 72 hours. APTT: No results for input(s): APTT in the last 72 hours. CARDIAC ENZYMES: No results for input(s): CKMB, CKMBINDEX, TROPONINT in the last 72 hours. Invalid input(s): CKTOTAL;3  FASTING LIPID PANEL:  Lab Results   Component Value Date    CHOL 103 05/20/2019    HDL 74 03/12/2021    TRIG 66 05/20/2019     LIVER PROFILE: No results for input(s): AST, ALT, ALB, BILIDIR, BILITOT, ALKPHOS in the last 72 hours. I/O (24Hr): Intake/Output Summary (Last 24 hours) at 3/28/2022 1317  Last data filed at 3/28/2022 0710  Gross per 24 hour   Intake 360 ml   Output 325 ml   Net 35 ml       Glu last 24 hour  No results for input(s): POCGLU in the last 72 hours. No results for input(s): CLARITYU, COLORU, PHUR, SPECGRAV, PROTEINU, RBCUA, BLOODU, BACTERIA, NITRU, WBCUA, LEUKOCYTESUR, YEAST, GLUCOSEU, BILIRUBINUR in the last 72 hours. Chest x-ray 3/23/2022  Old left rib fractures noted.  Examination is otherwise unremarkable.         Chest x-ray 3/27  Impression:        Stable appearance of the chest without acute cardiopulmonary process   identified. Impression/Plan:    1. Ataxia with L sided weakness:  PT/OT for gait, mobility, strengthening, endurance, ADLs, and self care. On Pramipexole  2. Cervical myelopathy: Hx C3-6 decompression. Has gabapentin and prn Tylenol for pain.   3. Cough - Robitussin,, continues incentive spirometry -may be due to limited deep breaths due to sternal discomfort, patient also on lisinopril, -chest x-ray due to history of heart failure-chest x-ray negative-notify of continued cough, sternal chest discomfort-suspect costochondritis-reproducible with palpation, discussed anti-inflammatory-patient notes unable to have due to kidney function, continue Ultram  4. Chronic diastolic heart failure/HTN/CAD: on amlodipine, lipitor, carvedilol, fenofibrate, lisinopril, furosemide-Norvasc decreased, monitor, chest x-ray negative  5. Major depressive disorder: on celexa. Monitor for now -addition of BuSpar by internal medicine, consider psych evaluation if patient agreeable-patient feels better today does not want psych evaluation at this time, denies suicidal /homicidal ideation  6. Chronic DVT: on eliquis - monitor with history of falls  7. Anemia: Hb near normal. On ferrous sulfate. Will monitor  8. Lower extremity cellulitis: completed Keflex   9. BIN: stable. Monitoring BMP,  on Lasix lisinopril  10. Restless left leg -Mirapex  11. Insomnia: has melatonin prn   12. Pain-Lidoderm patch to right shoulder Neurontin  13. Bowel Management: Miralax daily, senokot prn, dulcolax prn. 14. DVT Prophylaxis:  SCD's while in bed, PRAVEEN's  and Eliquis  15. Internal medicine for medical management        Jimmy Anthony. Zbigniew Fontenot MD       This note is created with the assistance of a speech recognition program.  While intending to generate a document that actually reflects the content of the visit, the document can still have some errors including those of syntax and sound a like substitutions which may escape proof reading.   In such instances, actual meaning can be extrapolated by contextual diversion

## 2022-03-28 NOTE — PROGRESS NOTES
Physical Therapy  Facility/Department: [facility] ACUTE REHAB  Daily Treatment Note  NAME: Cayden Anderson  : 1950  MRN: [id number]    Date of Service: 3/28/2022    Discharge Recommendations:  Patient would benefit from continued therapy after discharge   PT Equipment Recommendations  Other: Pt has lift chair, rolling walker, and a Rollator at home. Assessment   Body structures, Functions, Activity limitations: Decreased functional mobility ; Decreased ADL status; Decreased ROM; Decreased strength;Decreased endurance;Decreased balance;Decreased posture; Increased pain  Treatment Diagnosis: Impaired functional mobility 2* ataxia  Specific instructions for Next Treatment: transfers, amb, balance, standing program  REQUIRES PT FOLLOW UP: Yes  Activity Tolerance  Activity Tolerance: Patient limited by pain; Patient limited by fatigue;Patient limited by endurance  Activity Tolerance: severe cough and ribcage pain     Patient Diagnosis(es): There were no encounter diagnoses.      has a past medical history of Allergic rhinitis, cause unspecified, Back pain, Bowel obstruction (HCC), C. difficile diarrhea, CAD (coronary artery disease), Cardiac murmur, Cellulitis, Cellulitis, Cerebral artery occlusion with cerebral infarction ([address] COVID-19, Diverticulosis of colon (without mention of hemorrhage), GERD (gastroesophageal reflux disease), GERD (gastroesophageal reflux disease), History of blood transfusion, History of CHF (congestive heart failure), History of MI (myocardial infarction), History of ovarian cyst, History of peritonitis, HTN (hypertension), Hx of blood clots, Hyperlipidemia, Intestinal or peritoneal adhesions with obstruction (postoperative) (postinfection) ([address] Kidney infection, Lateral epicondylitis  of elbow, MDRO (multiple drug resistant organisms) resistance, Muscle strain, Other abnormal glucose, PONV (postoperative nausea and vomiting), Pre-diabetes, Restless legs syndrome (RLS), Snores, 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[facility] [address] TIA (transient ischemic attack), Uses walker, Vitamin D deficiency, Wears glasses, and Wellness examination. has a past surgical history that includes Ovary removal (1970); colectomy (0555); Appendectomy (1968); Ovary removal (1971); Hysterectomy (1973); Bunionectomy (Left); sinus surgery (2004); Colonoscopy; other surgical history (08/14/2014); cyst removal (Right); Wrist fracture surgery (Left, 03/05/2019); Upper gastrointestinal endoscopy (N/A, 05/31/2019); Upper gastrointestinal endoscopy (N/A, 08/05/2019); Upper gastrointestinal endoscopy (N/A, 08/23/2019); Abdomen surgery (1976); lumbar fusion (N/A, 02/10/2020); Cardiac catheterization; Cardiac catheterization (2005); [place] tooth extraction; lumbar fusion (N/A, 06/17/2020); back surgery; cervical fusion (05/21/2021); and cervical fusion (N/A, 5/21/2021). Restrictions  Restrictions/Precautions  Restrictions/Precautions: General Precautions,Fall Risk  Required Braces or Orthoses?: No  Implants present? : Metal implants ( Loop recorder)  Position Activity Restriction  Other position/activity restrictions: up as tolerated     Subjective   General  Chart Reviewed: Yes  Additional Pertinent Hx: TIA  Response To Previous Treatment: Patient with no complaints from previous session. Family / Caregiver Present: No  Referring Practitioner: Dr Iain Domínguez. Subjective  Subjective: Pt reported that she didn't sleep all night and her neck pain was worse earlier in the morning. Now, pt reports 6/10 pain in her chest/ribs + a severe cough.  ;   PM: pt reported 10/10 pain in her chest/ribs and her cough has worsened (nursing was aware and a respiratory therapist was on the way)  Pain Screening  Patient Currently in Pain: Yes  Pain Assessment  Pain Assessment: 0-10  Pain Level: 6  Pain Type: Acute pain  Pain Location: Rib cage  Pain Orientation: Mid  Pain Onset: On-going  Non-Pharmaceutical Pain Intervention(s): Repositioned;Rest;Ambulation/Increased Activity; Distraction; Other (Comment) (bracing over the sternum, when coughing, using a towel roll)  Response to Pain Intervention: Patient Satisfied  Vital Signs  Patient Currently in Pain: Yes       Orientation  Orientation  Overall Orientation Status: Within Normal Limits    Objective   Bed mobility  Rolling to Left: Minimal assistance  Rolling to Right: Minimal assistance  Supine to Sit: Minimal assistance  Sit to Supine: Maximum assistance  Scooting: Stand by assistance  Transfers  Sit to Stand: Contact guard assistance;Minimal Assistance (Marilyn from bed, CGA from chair)  Stand to sit: Contact guard assistance;Minimal Assistance (Marilyn from bed, CGA from chair)  Bed to Chair: Contact guard assistance  Stand Pivot Transfers: Contact guard assistance  Comment: RW used for transfers. Ambulation  Ambulation?: Yes  Ambulation 1  Surface: level tile  Device: Rolling Walker  Other Apparatus: Wheelchair follow  Assistance: Contact guard assistance  Quality of Gait: slow tawanda with step to pattern, flat foot LE at contact, decreased stance on L LE; downward gaze and cervical flexion  Gait Deviations: Decreased step length;Decreased step height;Shuffles; Slow Tawanda  Distance: 92 ft, 60 ft  Comments: pt needed to sit before getting back to her starting point  Stairs/Curb  Stairs?: Yes  Stairs  # Steps : 10  Stairs Height:  (4\"/6\")  Rails: Bilateral  Device: No Device  Assistance: Contact guard assistance  Comment: step-to pattern  Wheelchair Activities  Propulsion: Yes  Propulsion 1  Propulsion: Manual  Level: Level Tile  Method: RUE;LUE  Level of Assistance: Modified independent  Distance: short distances in the gym     Balance  Posture: Fair  Sitting - Static: Good  Sitting - Dynamic: Fair  Standing - Static: Fair (at [employer])  Standing - Dynamic: Fair;- (at [employer])  Comments: Standing with RW.   Other exercises  Other exercises?: Yes  Other exercises 1: supine B LE exercises x20 reps Other exercises 2: Seated Ex: BLE x20 reps with #2 and green tband   Other exercises 3: bed mobility x2   Other exercises 5: Standing Ex: BLE x10 reps, in // bars     Goals  Short term goals  Time Frame for Short term goals: 1 week  Short term goal 1: Pt to demostrate bed mobility CGA/Jennifer. Short term goal 2: Pt to perform transfers CGA. Short term goal 3: Pt to amb 100'-150' with device, CGA. Short term goal 4: Pt to improve BLE strength by 1/2 MMG. Short term goal 5: Pt to improve standing balance to [place]. Short term goal 6: Pt able to perform 4\" and 6\" steps x3 in //bars or acute stair way, with 2 B UE support, min A  Long term goals  Time Frame for Long term goals : By DC  Long term goal 1: Pt able to perform bed mobility at SBA  Long term goal 2: Pt able to transfer at supervsion level. Long term goal 3:  Pt able to ambulate house hold distances with assistive device mod-I  Long term goal 4:  Pt able to ambulate distance of 150 ft, level surfaces and shorter distance outside terraine at SBA. Long term goal 5: Pt able to propel w/c on level surface, distance of 150 ft, supervsion level. Long term goal 6: Improve 2MWT distance to atleast 120 ft to improve overall fucntion. Long term goal 7: Pt to improve Tinetti balance score to atlest 20/28 to reduce fall risk. Long term goal 8: Pt donny to perform cub step with B UE support and/or donny to goup and down 4 to 5 steps with 2 rails at min A   Patient Goals   Patient goals : To get stronger    Plan    Plan  Times per week: 1.5 hr/day, 5 to 7 days/week.   Specific instructions for Next Treatment: transfers, amb, balance, standing program  Current Treatment Recommendations: Strengthening,Balance Training,Functional Mobility Training,Transfer Training,Endurance Training,Gait Training,Equipment Evaluation, Education, & procurement,Patient/Caregiver Education & Training,Safety Education & Training,Stair training,Wheelchair Mobility Training  Safety Devices  Type of devices:  All fall risk precautions in place,Call light within reach,Gait belt,Patient at risk for falls,Nurse notified,Left in chair,Chair alarm in place     Therapy Time     03/28/22 1106 03/28/22 1330   PT Individual Minutes   Time In 1106   (1350: pt reported severe cough and chest/rib pain)   Time Out 1210   (1400)   Minutes 64  --    Minute Variance   Variance  --  26   Reason  --  Illness  (pt reported severe cough and chest/rib pain, RT coming)   Time Code Minutes   Timed Code Treatment Minutes 64 Minutes 0 [id number] [address], PTA

## 2022-03-28 NOTE — PLAN OF CARE
Problem: Skin Integrity:  Goal: Will show no infection signs and symptoms  Description: Will show no infection signs and symptoms  3/28/2022 0349 by Jhon Lares RN  Outcome: Ongoing  3/27/2022 1658 by Omari Lyons RN  Outcome: Ongoing  Goal: Absence of new skin breakdown  Description: Absence of new skin breakdown  3/28/2022 0349 by Jhon Lares RN  Outcome: Ongoing  3/27/2022 1658 by Omari Lyons RN  Outcome: Ongoing     Problem: Falls - Risk of:  Goal: Will remain free from falls  Description: Will remain free from falls  3/28/2022 0349 by Jhon Lares RN  Outcome: Ongoing  3/27/2022 1658 by Omari Lyons RN  Outcome: Ongoing  Goal: Absence of physical injury  Description: Absence of physical injury  3/28/2022 0349 by Jhon Lares RN  Outcome: Ongoing  3/27/2022 1658 by Omari Lyons RN  Outcome: Ongoing     Problem: Pain:  Goal: Pain level will decrease  Description: Pain level will decrease  3/28/2022 0349 by Jhon Lares RN  Outcome: Ongoing  3/27/2022 1658 by Omari Lyons RN  Outcome: Ongoing  Goal: Control of acute pain  Description: Control of acute pain  3/28/2022 0349 by Jhon Lares RN  Outcome: Ongoing  3/27/2022 1658 by Omari Lyons RN  Outcome: Ongoing  Goal: Control of chronic pain  Description: Control of chronic pain  3/28/2022 0349 by Jhon Lares RN  Outcome: Ongoing  3/27/2022 1658 by Omari Lyons RN  Outcome: Ongoing     Problem: Mobility - Impaired:  Goal: Mobility will improve  Description: Mobility will improve  3/28/2022 0349 by Jhon Lares RN  Outcome: Ongoing  3/27/2022 1658 by Omari Lyons RN  Outcome: Ongoing     Problem: Musculor/Skeletal Functional Status  Goal: Highest potential functional level  3/28/2022 0349 by Jhon Lares RN  Outcome: Ongoing  3/27/2022 1658 by Omari Lyons RN  Outcome: Ongoing  Goal: Absence of falls  3/28/2022 0349 by Jhon Lares RN  Outcome: Ongoing  3/27/2022 1658 by Omari Lyons RN  Outcome: Ongoing     Problem: Musculor/Skeletal Functional Status  Goal: Highest potential functional level  3/28/2022 0349 by Arlette Arteaga RN  Outcome: Ongoing  3/27/2022 1658 by Dacia Lira RN  Outcome: Ongoing  Goal: Absence of falls  3/28/2022 0349 by Arlette Arteaga RN  Outcome: Ongoing  3/27/2022 1658 by Dacia Lira RN  Outcome: Ongoing     Problem: Nutrition  Goal: Optimal nutrition therapy  3/28/2022 0349 by Arlette Arteaga RN  Outcome: Ongoing  3/27/2022 1658 by Dacia Lira RN  Outcome: Ongoing

## 2022-03-29 ENCOUNTER — APPOINTMENT (OUTPATIENT)
Dept: GENERAL RADIOLOGY | Age: 72
DRG: 949 | End: 2022-03-29
Attending: PHYSICAL MEDICINE & REHABILITATION
Payer: MEDICARE

## 2022-03-29 LAB
MYOGLOBIN: 70 NG/ML (ref 25–58)
PRO-BNP: 143 PG/ML
TOTAL CK: 98 U/L (ref 26–192)
TROPONIN, HIGH SENSITIVITY: 24 NG/L (ref 0–14)
TROPONIN, HIGH SENSITIVITY: 28 NG/L (ref 0–14)

## 2022-03-29 PROCEDURE — 94761 N-INVAS EAR/PLS OXIMETRY MLT: CPT

## 2022-03-29 PROCEDURE — 2700000000 HC OXYGEN THERAPY PER DAY

## 2022-03-29 PROCEDURE — 1180000000 HC REHAB R&B

## 2022-03-29 PROCEDURE — 97110 THERAPEUTIC EXERCISES: CPT

## 2022-03-29 PROCEDURE — 94640 AIRWAY INHALATION TREATMENT: CPT

## 2022-03-29 PROCEDURE — 84484 ASSAY OF TROPONIN QUANT: CPT

## 2022-03-29 PROCEDURE — 97116 GAIT TRAINING THERAPY: CPT

## 2022-03-29 PROCEDURE — 99232 SBSQ HOSP IP/OBS MODERATE 35: CPT | Performed by: PHYSICAL MEDICINE & REHABILITATION

## 2022-03-29 PROCEDURE — 97530 THERAPEUTIC ACTIVITIES: CPT

## 2022-03-29 PROCEDURE — 6370000000 HC RX 637 (ALT 250 FOR IP): Performed by: PHYSICAL MEDICINE & REHABILITATION

## 2022-03-29 PROCEDURE — 36415 COLL VENOUS BLD VENIPUNCTURE: CPT

## 2022-03-29 PROCEDURE — 6370000000 HC RX 637 (ALT 250 FOR IP): Performed by: NURSE PRACTITIONER

## 2022-03-29 PROCEDURE — 99232 SBSQ HOSP IP/OBS MODERATE 35: CPT | Performed by: INTERNAL MEDICINE

## 2022-03-29 PROCEDURE — 71045 X-RAY EXAM CHEST 1 VIEW: CPT

## 2022-03-29 PROCEDURE — 93005 ELECTROCARDIOGRAM TRACING: CPT | Performed by: PHYSICAL MEDICINE & REHABILITATION

## 2022-03-29 PROCEDURE — 6370000000 HC RX 637 (ALT 250 FOR IP): Performed by: INTERNAL MEDICINE

## 2022-03-29 PROCEDURE — 83874 ASSAY OF MYOGLOBIN: CPT

## 2022-03-29 PROCEDURE — 82550 ASSAY OF CK (CPK): CPT

## 2022-03-29 PROCEDURE — 83880 ASSAY OF NATRIURETIC PEPTIDE: CPT

## 2022-03-29 RX ORDER — AZITHROMYCIN 250 MG/1
250 TABLET, FILM COATED ORAL DAILY
Status: COMPLETED | OUTPATIENT
Start: 2022-03-30 | End: 2022-04-02

## 2022-03-29 RX ORDER — AZITHROMYCIN 500 MG/1
500 TABLET, FILM COATED ORAL DAILY
Status: COMPLETED | OUTPATIENT
Start: 2022-03-29 | End: 2022-03-29

## 2022-03-29 RX ORDER — PREDNISONE 20 MG/1
40 TABLET ORAL DAILY
Status: COMPLETED | OUTPATIENT
Start: 2022-03-29 | End: 2022-04-02

## 2022-03-29 RX ADMIN — IPRATROPIUM BROMIDE AND ALBUTEROL SULFATE 1 AMPULE: 2.5; .5 SOLUTION RESPIRATORY (INHALATION) at 15:56

## 2022-03-29 RX ADMIN — IPRATROPIUM BROMIDE AND ALBUTEROL SULFATE 1 AMPULE: 2.5; .5 SOLUTION RESPIRATORY (INHALATION) at 10:35

## 2022-03-29 RX ADMIN — CITALOPRAM HYDROBROMIDE 20 MG: 20 TABLET ORAL at 07:59

## 2022-03-29 RX ADMIN — AMLODIPINE BESYLATE 5 MG: 5 TABLET ORAL at 07:59

## 2022-03-29 RX ADMIN — ACETAMINOPHEN 650 MG: 325 TABLET ORAL at 19:40

## 2022-03-29 RX ADMIN — APIXABAN 5 MG: 5 TABLET, FILM COATED ORAL at 07:59

## 2022-03-29 RX ADMIN — TRAMADOL HYDROCHLORIDE 50 MG: 50 TABLET, COATED ORAL at 08:00

## 2022-03-29 RX ADMIN — FENOFIBRATE 160 MG: 160 TABLET ORAL at 08:00

## 2022-03-29 RX ADMIN — BUSPIRONE HYDROCHLORIDE 5 MG: 5 TABLET ORAL at 08:01

## 2022-03-29 RX ADMIN — ATORVASTATIN CALCIUM 40 MG: 40 TABLET, FILM COATED ORAL at 20:11

## 2022-03-29 RX ADMIN — GABAPENTIN 200 MG: 100 CAPSULE ORAL at 08:00

## 2022-03-29 RX ADMIN — FUROSEMIDE 20 MG: 20 TABLET ORAL at 16:42

## 2022-03-29 RX ADMIN — LOSARTAN POTASSIUM 100 MG: 100 TABLET, FILM COATED ORAL at 07:59

## 2022-03-29 RX ADMIN — CYANOCOBALAMIN TAB 1000 MCG 1000 MCG: 1000 TAB at 07:59

## 2022-03-29 RX ADMIN — FERROUS SULFATE TAB 325 MG (65 MG ELEMENTAL FE) 325 MG: 325 (65 FE) TAB at 20:11

## 2022-03-29 RX ADMIN — AZITHROMYCIN 500 MG: 500 TABLET, FILM COATED ORAL at 19:09

## 2022-03-29 RX ADMIN — FERROUS SULFATE TAB 325 MG (65 MG ELEMENTAL FE) 325 MG: 325 (65 FE) TAB at 08:00

## 2022-03-29 RX ADMIN — TRAMADOL HYDROCHLORIDE 50 MG: 50 TABLET, COATED ORAL at 20:11

## 2022-03-29 RX ADMIN — BUSPIRONE HYDROCHLORIDE 5 MG: 5 TABLET ORAL at 20:14

## 2022-03-29 RX ADMIN — IPRATROPIUM BROMIDE AND ALBUTEROL SULFATE 1 AMPULE: 2.5; .5 SOLUTION RESPIRATORY (INHALATION) at 19:42

## 2022-03-29 RX ADMIN — CARVEDILOL 12.5 MG: 12.5 TABLET, FILM COATED ORAL at 20:12

## 2022-03-29 RX ADMIN — CALCIUM CARBONATE 600 MG (1,500 MG)-VITAMIN D3 400 UNIT TABLET 1 TABLET: at 07:59

## 2022-03-29 RX ADMIN — GABAPENTIN 200 MG: 100 CAPSULE ORAL at 20:11

## 2022-03-29 RX ADMIN — Medication 6 MG: at 20:12

## 2022-03-29 RX ADMIN — POLYETHYLENE GLYCOL 3350 17 G: 17 POWDER, FOR SOLUTION ORAL at 07:59

## 2022-03-29 RX ADMIN — CARVEDILOL 12.5 MG: 12.5 TABLET, FILM COATED ORAL at 08:00

## 2022-03-29 RX ADMIN — PRAMIPEXOLE DIHYDROCHLORIDE 0.5 MG: 0.25 TABLET ORAL at 20:11

## 2022-03-29 RX ADMIN — PREDNISONE 40 MG: 20 TABLET ORAL at 19:09

## 2022-03-29 RX ADMIN — FUROSEMIDE 20 MG: 20 TABLET ORAL at 07:59

## 2022-03-29 RX ADMIN — BUSPIRONE HYDROCHLORIDE 5 MG: 5 TABLET ORAL at 13:36

## 2022-03-29 RX ADMIN — APIXABAN 5 MG: 5 TABLET, FILM COATED ORAL at 20:11

## 2022-03-29 ASSESSMENT — PAIN DESCRIPTION - ORIENTATION
ORIENTATION: MID
ORIENTATION: MID

## 2022-03-29 ASSESSMENT — PAIN SCALES - GENERAL
PAINLEVEL_OUTOF10: 6
PAINLEVEL_OUTOF10: 8
PAINLEVEL_OUTOF10: 0
PAINLEVEL_OUTOF10: 10
PAINLEVEL_OUTOF10: 10
PAINLEVEL_OUTOF10: 8
PAINLEVEL_OUTOF10: 8

## 2022-03-29 ASSESSMENT — PAIN DESCRIPTION - LOCATION
LOCATION: CHEST
LOCATION: RIB CAGE;CHEST
LOCATION: CHEST

## 2022-03-29 ASSESSMENT — PAIN DESCRIPTION - PAIN TYPE
TYPE: ACUTE PAIN

## 2022-03-29 NOTE — PROGRESS NOTES
Chest x-ray negative, troponin elevated 24. Attempting to contact Dr. Dana Marinelli and Dr Guzman Nunes left message, also contacted Dr. Corrina Anderson awaiting response.      Personally discussed with Dr. Tong Alvarado of above -will evaluate

## 2022-03-29 NOTE — PROGRESS NOTES
Physical Therapy  Facility/Department: [facility]  Daily Treatment Note  NAME: Dakota Escamilla  : 1950  MRN: [id number]    Date of Service: 3/29/2022    Discharge Recommendations:  Patient would benefit from continued therapy after discharge        Assessment      PT Education: General Safety;Gait Training;Transfer Training; Adaptive Device Training; Injury Prevention;Pressure Relief; Functional Mobility Training;Energy Conservation  Patient Education: coughing with a pillow to brace ribs; STS transfers  Activity Tolerance  Activity Tolerance: Patient limited by pain; Patient limited by fatigue;Patient limited by endurance  Activity Tolerance: severe cough and ribcage pain     Patient Diagnosis(es): There were no encounter diagnoses. has a past medical history of Allergic rhinitis, cause unspecified, Back pain, Bowel obstruction (HCC), C. difficile diarrhea, CAD (coronary artery disease), Cardiac murmur, Cellulitis, Cellulitis, Cerebral artery occlusion with cerebral infarction ([address] COVID-19, Diverticulosis of colon (without mention of hemorrhage), GERD (gastroesophageal reflux disease), GERD (gastroesophageal reflux disease), History of blood transfusion, History of CHF (congestive heart failure), History of MI (myocardial infarction), History of ovarian cyst, History of peritonitis, HTN (hypertension), Hx of blood clots, Hyperlipidemia, Intestinal or peritoneal adhesions with obstruction (postoperative) (postinfection) ([address] Kidney infection, Lateral epicondylitis  of elbow, MDRO (multiple drug resistant organisms) resistance, Muscle strain, Other abnormal glucose, PONV (postoperative nausea and vomiting), Pre-diabetes, Restless legs syndrome (RLS), Snores, 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[address] TIA (transient ischemic attack), Uses walker, Vitamin D deficiency, Wears glasses, and Wellness examination.    has a past surgical history that includes Ovary removal (); colectomy (51); Appendectomy (1968); Ovary removal (1971); Hysterectomy (1973); Bunionectomy (Left); sinus surgery (2004); Colonoscopy; other surgical history (08/14/2014); cyst removal (Right); Wrist fracture surgery (Left, 03/05/2019); Upper gastrointestinal endoscopy (N/A, 05/31/2019); Upper gastrointestinal endoscopy (N/A, 08/05/2019); Upper gastrointestinal endoscopy (N/A, 08/23/2019); Abdomen surgery (1976); lumbar fusion (N/A, 02/10/2020); Cardiac catheterization; Cardiac catheterization (2005); [place] tooth extraction; lumbar fusion (N/A, 06/17/2020); back surgery; cervical fusion (05/21/2021); and cervical fusion (N/A, 5/21/2021). Restrictions  Restrictions/Precautions  Restrictions/Precautions: General Precautions,Fall Risk  Required Braces or Orthoses?: No  Implants present? : Metal implants ( Loop recorder)  Position Activity Restriction  Other position/activity restrictions: up as tolerated     Subjective   General  Chart Reviewed: Yes  Additional Pertinent Hx: TIA  Response To Previous Treatment: Patient with no complaints from previous session. Family / Caregiver Present: No  Referring Practitioner: Dr Justin Ellis. Subjective  Subjective: Pt reported that she slept better last night. Pt reported 0/10 pain, but it's 10/10 in her chest/ribs when she coughs. Pt reported dizziness during AMB. General Comment  Comments: 3/29: Pt reported dizziness during AMB (PTA checked pt's SpO2 after she sat to rest and it was 90-91% on RA, PTA reported this to the RN and PM&R doctor. Pt was put on med hold, to for rest and allow further assessment)  Pain Screening  Patient Currently in Pain: Yes (Pt reported 0/10 pain, but it's 10/10 in her chest/ribs when she coughs)  Pain Assessment  Pain Assessment: 0-10  Pain Level: 10 (Pt reported 0/10 pain, but it's 10/10 in her chest/ribs when she coughs)  Pain Type: Acute pain  Pain Location: Rib cage; Chest  Pain Orientation: Mid  Non-Pharmaceutical Pain Intervention(s): Repositioned;Rest;Distraction; Emotional support  Response to Pain Intervention: Patient Satisfied  Vital Signs  Patient Currently in Pain: Yes (Pt reported 0/10 pain, but it's 10/10 in her chest/ribs when she coughs)       Orientation  Orientation  Overall Orientation Status: Within Normal Limits    Objective   Bed mobility  Rolling to Left: Minimal assistance  Rolling to Right: Minimal assistance  Supine to Sit: Minimal assistance (A trunk in upright position )  Sit to Supine: Moderate assistance (A BLEs )  Scooting: Minimal assistance  Comment: MOD A to return to bed for BLE management due to increased pain and fatigue  Transfers  Sit to Stand: Contact guard assistance;Minimal Assistance (CGA-Marilyn from chair)  Stand to sit: Contact guard assistance;Minimal Assistance (CGA-Marilyn from chair)  Bed to Chair: Contact guard assistance;Minimal assistance (CGA-Marilyn STS from chair)  Stand Pivot Transfers: Contact guard assistance;Minimal Assistance (CGA-Marilyn STS from chair)  Comment: RW used for transfers  Ambulation  Ambulation?: Yes  Ambulation 1  Surface: level tile  Device: Rolling Walker  Other Apparatus: Wheelchair follow  Assistance: Contact guard assistance  Quality of Gait: slow tawanda with step to pattern, flat foot LE at contact, decreased stance on L LE; downward gaze and cervical flexion  Gait Deviations: Decreased step length;Decreased step height;Shuffles; Slow Tawanda  Distance: 94 feet  Comments: Pt reported dizziness during AMB (PTA checked pt's SpO2 after she sat to rest and it was 90-91% on RA, PTA reported this to the RN and PM&R doctor. Pt was put on med hold, to for rest and allow further assessment)     Balance  Posture: Fair  Sitting - Static: Good  Sitting - Dynamic: Fair  Standing - Static: Fair (at [employer])  Standing - Dynamic: Fair  Comments: Standing with RW.   Other exercises  Other exercises?: Yes  Other exercises 2: Seated Ex: BLE x20 reps with #2 and green tband   Other exercises 5: Standing Ex: BLE x10 reps, in // bars  Other exercises 6: STS transfers: x4       Goals  Short term goals  Time Frame for Short term goals: 1 week  Short term goal 1: Pt to demostrate bed mobility CGA/Jennifer. Short term goal 2: Pt to perform transfers CGA. Short term goal 3: Pt to amb 100'-150' with device, CGA. Short term goal 4: Pt to improve BLE strength by 1/2 MMG. Short term goal 5: Pt to improve standing balance to [facility]. Short term goal 6: Pt able to perform 4\" and 6\" steps x3 in //bars or acute stair way, with 2 B UE support, min A  Long term goals  Time Frame for Long term goals : By DC  Long term goal 1: Pt able to perform bed mobility at SBA  Long term goal 2: Pt able to transfer at supervsion level. Long term goal 3:  Pt able to ambulate house hold distances with assistive device mod-I  Long term goal 4:  Pt able to ambulate distance of 150 ft, level surfaces and shorter distance outside terraine at A. Long term goal 5: Pt able to propel w/c on level surface, distance of 150 ft, supervsion level. Long term goal 6: Improve 2MWT distance to atleast 120 ft to improve overall fucntion. Long term goal 7: Pt to improve Tinetti balance score to atlest 20/28 to reduce fall risk. Long term goal 8: Pt donny to perform cub step with B UE support and/or donny to goup and down 4 to 5 steps with 2 rails at min A   Patient Goals   Patient goals : To get stronger    Plan    Plan  Times per week: 1.5 hr/day, 5 to 7 days/week. Specific instructions for Next Treatment: transfers, amb, balance, standing program  Current Treatment Recommendations: Strengthening,Balance Training,Functional Mobility Training,Transfer Training,Endurance Training,Gait Training,Equipment Evaluation, Education, & procurement,Patient/Caregiver Education & Training,Safety Education & Training,Stair training,Wheelchair Mobility Training  Safety Devices  Type of devices:  All fall risk precautions in place,Call light within reach,Gait belt,Patient at risk for falls,Nurse notified,Left in chair,Chair alarm in place     Therapy Time     03/29/22 0805 03/29/22 1430   PT Individual Minutes   Time In 0805  --    Time Out 0905  --    Minutes 60  --    Minute Variance   Variance  --  30   Reason  --  Med Hold  (10/10 rib, chest pain while coughing)   Time Code Minutes   Timed Code Treatment Minutes 60 Minutes 0 [id number] [address], PTA

## 2022-03-29 NOTE — PROGRESS NOTES
[facility] Internal Medicine    CONSULTATION / HISTORY AND PHYSICAL EXAMINATION            Date:   3/29/2022  Patient name:  Kelly Odom  Date of admission:  3/23/2022  4:52 PM  MRN:   [id number]  Account:  [de-identified]  YOB: 1950  PCP:    Marcy Duverney, MD  Room:   [facility]-  Code Status:    Full Code    Physician Requesting Consult: Flavia Bravo MD    Reason for Consult:  Medical management    Chief Complaint:     No chief complaint on file.   Debility    History Obtained From:     Patient, EMR, nursing staff    History of Present Illness:     50-year-old female initially presented to the multiple falls over several weeks in the setting of chronic cervical spine stenosis with myelopathy status post cervical fusion follows with neurosurgery  Recently started on Eliquis for acute DVT right leg  Trauma work-up CT brain in this admission unremarkable other than multiple remote and healing rib fractures, patient uses walker at home  Neurology review was obtained for worsening gait instability-MRI thoracic spine did show T2-T3 and T5-T7 moderate neural foraminal narrowing  Also patient was noted to have bilateral leg swelling with some stasis dermatitis, early cellulitis-started on diuresis as well as antibiotics  3/26   Patient feeling better  Feels that anxiety is better after starting buspar   Working with PT  3/28 ]  Patient complaining of cough, wheezing  She told the nurse that, cough is going on since she started lisinopril  Had wheezing, which improved with nebulization  3/29   Patient complaining of cough, shortness of breath, wheezing  Chest x-ray seen,  Had EKG, troponins which are flat  Past Medical History:     Past Medical History:   Diagnosis Date    Allergic rhinitis, cause unspecified     Back pain     lumbar    Bowel obstruction (HCC)     history of due to scar tissue, resolved non-surgically    C. difficile diarrhea     CAD (coronary artery disease)     no stent needed per pt.  Dr. Chavez Given did cath at [facility]    Cardiac murmur     Cellulitis     left leg    Cellulitis 2017 August    leg left leg/bug bite    Cerebral artery occlusion with cerebral infarction [facility])     TIA 2014    COVID-19     ONE YR AGO IN 4/25/2020 fever and cough    Diverticulosis of colon (without mention of hemorrhage)     GERD (gastroesophageal reflux disease)     GERD (gastroesophageal reflux disease)     on rx    History of blood transfusion     approx 2020        History of CHF (congestive heart failure)     History of MI (myocardial infarction) 2005    thought due to a blood clot    History of ovarian cyst 1970    had oopherectomy holly    History of peritonitis 1968    due to ruptured appendix age 13    HTN (hypertension)     Hx of blood clots     right leg    Hyperlipidemia     Intestinal or peritoneal adhesions with obstruction (postoperative) (postinfection) ([address]     Kidney infection     renal failure/sepsis/spider bite    Lateral epicondylitis  of elbow     MDRO (multiple drug resistant organisms) resistance     c diff    Muscle strain     right posterior shoulder    Other abnormal glucose     PONV (postoperative nausea and vomiting)     dry heaves    Pre-diabetes     Restless legs syndrome (RLS)     Snores     no cpap    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)     TIA (transient ischemic attack) 2014    Uses walker     Vitamin D deficiency     Wears glasses     Wellness examination     last seen 2 weeks ago        Past Surgical History:     Past Surgical History:   Procedure Laterality Date    ABDOMEN SURGERY  1976    benign tumor removed near remaining ovary, 1.5 pounds    APPENDECTOMY  1968    appendix ruptured, developed peritonitis    BACK SURGERY      BUNIONECTOMY Left     along with calcium deposits removed   [address]  2005    negative    CERVICAL FUSION  05/21/2021 POSTERIOR C3-6 LAMINECTOMY, PARTIAL C7 LAMINECTOMY, FUSION C3-C6, SILVERCORD    CERVICAL FUSION N/A 5/21/2021    POSTERIOR C3-6 LAMINECTOMY, PARTIAL C7 LAMINECTOMY, FUSION C3-C6, SILVERCORD performed by Myra Weston DO at [address]    12 INCHES REMOVED D/T OBSTRUCTION    COLONOSCOPY      CYST REMOVAL Right     right facial    HYSTERECTOMY  1973    taken as a result of recurring cysts    LUMBAR FUSION N/A 02/10/2020    LUMBAR L4-5 POSTERIOR  DECOMPRESSION INSTRUMENTATION FUSION WCEMENT AUGMENTATION/ [facility] N/A 06/17/2020    L5-S1 PLIF L4-L5 REVISION performed by Caro Spencer MD at [address]  08/14/2014    FESS    OVARY REMOVAL  1970    UNILATERAL due to cyst    OVARY REMOVAL  1971    partial, due to cyst    SINUS SURGERY  2004    UPPER GASTROINTESTINAL ENDOSCOPY N/A 05/31/2019    EGD ESOPHAGOGASTRODUODENOSCOPY performed by Regina Guajardo MD at [facility] N/A 08/05/2019    EGD BIOPSY performed by Meghan Sherwood MD at [address] [place] [address] N/A 08/23/2019    EGD BIOPSY performed by Regina Guajardo MD at [address] [facility] 03/05/2019    WRIST OPEN REDUCTION INTERNAL FIXATION performed by Maryjane Brink MD at [address]        Medications Prior to Admission:     Prior to Admission medications    Medication Sig Start Date End Date Taking?  Authorizing Provider   amLODIPine (NORVASC) 10 MG tablet Take 1 tablet by mouth daily 3/10/22   Tiffany Ibrahim MD   furosemide (LASIX) 40 MG tablet Take 1 tablet by mouth 2 times daily 3/1/22   MAIK Nair - CNP   carvedilol (COREG) 12.5 MG tablet Take 1 tablet by mouth 2 times daily 2/23/22   MAIK Nair - CNP   apixaban (ELIQUIS) 5 MG TABS tablet Please take 2 tablets by mouth for the first week then take 1 tablet by mouth BID for acute DVT  Patient taking differently: Take 5 mg by mouth 2 times daily take 1 tablet by mouth BID for acute DVT 2/17/22   MAIK Srivastava CNP   atorvastatin (LIPITOR) 80 MG tablet Take 0.5 tablets by mouth nightly 2/1/22   MAIK Srivastava CNP   lisinopril (PRINIVIL;ZESTRIL) 30 MG tablet Take 1 tablet by mouth daily 1/21/22   Cipriano David MD   fenofibrate micronized (LOFIBRA) 134 MG capsule Take 1 capsule by mouth every morning (before breakfast) 1/13/22   Cipriano David MD   pramipexole (MIRAPEX) 0.5 MG tablet Take 1 tablet by mouth nightly 1/6/22   Cipriano David MD   citalopram (CELEXA) 20 MG tablet Take 1 tablet by mouth daily 1/3/22   Jessica Cline MD   nitroGLYCERIN (NITROSTAT) 0.4 MG SL tablet Place 1 tablet under the tongue every 5 minutes as needed for Chest pain 9/1/21   Cipriano David MD   docusate sodium (COLACE) 100 MG capsule Take 1 capsule by mouth 2 times daily as needed for Constipation 5/30/21   Rufina Marley,    diphenhydrAMINE HCl (BENADRYL ALLERGY PO) Take 1 capsule by mouth daily Takes q HS    Historical Provider, MD   cyanocobalamin ([facility] VITAMIN B12) 1000 MCG tablet Take 1 tablet by mouth daily 12/3/20   Cipriano David MD   ferrous sulfate (IRON 325) 325 (65 Fe) MG tablet Take 1 tablet by mouth 2 times daily 7/2/20   Thomas Pacheco MD   VITAMIN D, ERGOCALCIFEROL, PO Take by mouth    Historical Provider, MD   Lancets MISC 1 each by Does not apply route daily 10/10/19   Susan Blankenship MD   blood glucose monitor strips Test 2 times a day & as needed for symptoms of irregular blood glucose. 10/10/19   Susan Blankenship MD   Calcium Carbonate-Vitamin D (CALCIUM 500/D) 500-125 MG-UNIT TABS Take 1 tablet by mouth daily     Historical Provider, MD        Allergies:     Bactrim [sulfamethoxazole-trimethoprim], Codeine, and Seasonal    Social History:     Tobacco:    reports that she quit smoking about 4 years ago. Her smoking use included cigarettes.  She started smoking about 26 years ago. She has a 10.00 pack-year smoking history. She has never used smokeless tobacco.  Alcohol:      reports no history of alcohol use. Drug Use:  reports no history of drug use. Family History:     Family History   Problem Relation Age of Onset    Stroke Mother     Diabetes Mother     Heart Disease Mother     High Blood Pressure Mother     Heart Disease Father     Heart Disease Brother     High Blood Pressure Brother     Heart Disease Maternal Grandmother     High Blood Pressure Sister        Review of Systems:     Positive and Negative as described in HPI. CONSTITUTIONAL:  negative for fevers, chills, sweats, fatigue, weight loss  HEENT:  negative for vision, hearing changes, runny nose, throat pain  RESPIRATORY: Positive for cough, shortness of breath, wheezing  CARDIOVASCULAR: Positive for chest, worse with cough. GASTROINTESTINAL:  negative for nausea, vomiting, diarrhea, constipation, change in bowel habits, abdominal pain   GENITOURINARY:  negative for difficulty of urination, burning with urination, frequency   INTEGUMENT:  negative for rash, skin lesions, easy bruising   HEMATOLOGIC/LYMPHATIC:  negative for swelling/edema   ALLERGIC/IMMUNOLOGIC:  negative for urticaria , itching  ENDOCRINE:  negative increase in drinking, increase in urination, hot or cold intolerance  MUSCULOSKELETAL:  negative joint pains, muscle aches, swelling of joints  NEUROLOGICAL:  negative for headaches, dizziness, lightheadedness, numbness, pain, tingling extremities      Physical Exam:     /66   Pulse 67   Temp 99.2 °F (37.3 °C) (Oral)   Resp 18   Ht 5' 3\" (1.6 m)   Wt 188 lb (85.3 kg)   SpO2 (!) 88%   BMI 33.30 kg/m²   Temp (24hrs), Av.9 °F (37.2 °C), Min:98.6 °F (37 °C), Max:99.2 °F (37.3 °C)    No results for input(s): POCGLU in the last 72 hours.     Intake/Output Summary (Last 24 hours) at 3/29/2022 1713  Last data filed at 3/29/2022 0730  Gross per 24 hour   Intake 600 ml   Output -- Net 600 ml       General Appearance:  alert, well appearing, and in no acute distress  Head:  normocephalic, atraumatic. Eye: no icterus, redness, pupils equal and reactive, extraocular eye movements intact, conjunctiva clear  Ear: normal external ear, no discharge, hearing intact  Nose:  no drainage noted  Mouth: mucous membranes moist  Neck: supple, no carotid bruits, thyroid not palpable  Lungs: Air entry but decreased, better wheezing present  Cardiovascular: normal rate, regular rhythm, no murmur, gallop, rub.   Abdomen: Soft, nontender, nondistended, normal bowel sounds, no hepatomegaly or splenomegaly  Neurologic: There are no new focal motor or sensory deficits, normal muscle tone and bulk, no abnormal sensation, normal speech, cranial nerves II through XII grossly intact  Skin: No gross lesions, rashes, bruising or bleeding on exposed skin area  Extremities:  peripheral pulses palpable, no pedal edema or calf pain with palpation  Psych: normal affect    Investigations:      Laboratory Testing:  Recent Results (from the past 24 hour(s))   Trop/Myoglobin    Collection Time: 03/29/22 10:36 AM   Result Value Ref Range    Troponin, High Sensitivity 24 (H) 0 - 14 ng/L    Myoglobin 70 (H) 25 - 58 ng/mL   CK    Collection Time: 03/29/22 10:36 AM   Result Value Ref Range    Total CK 98 26 - 192 U/L   Brain Natriuretic Peptide    Collection Time: 03/29/22 10:36 AM   Result Value Ref Range    Pro- <300 pg/mL   Troponin    Collection Time: 03/29/22  3:32 PM   Result Value Ref Range    Troponin, High Sensitivity 28 (H) 0 - 14 ng/L       Imaging/Diagonstics:  Recent data reviewed    Assessment :      Primary Problem  Cervical myelopathy ([facility]    Active Hospital Problems    Diagnosis Date Noted    Cervical myelopathy ([facility] [G95.9] 03/17/2022       Plan:     Multiple falls, gait instability, multiple remote and healing rib fractures leading to chest pain-needs PT OT  Neural foraminal narrowing of thoracic spine-neurology as reviewed-recommend rehab and physical therapy  Hypertension-continue amlodipine, Coreg  Right leg DVT-continue Eliquis  Chronic diastolic CHF-currently compensated-continue Coreg, Lipitor, Lasix, lisinopril  Depression -patient noted to be very tearful today-is already on maximal dose of  Celexa, will add BuSpar    DVT prophylaxis-patient on Eliquis    3/27   But has readings of low blood pressure, asymptomatic  Reducing dose of Norvasc to 5 from 10    3/28   Patient has chronic cough, started since she is put on ACE inhibitor  May have ACE inhibitor induced cough, will discontinue lisinopril, start patient on Cozaar  Started patient given nebulization  Has history of smoking  Chest x-ray seen   3/29   Suspecting symptoms are due to acute bronchitis,  Ordering Z-Han, prednisone  EKG seen, troponins are flat  Chest x-ray seen  We will monitor  Consultations:     IP CONSULT TO DIETITIAN  IP CONSULT TO SOCIAL WORK  IP CONSULT TO INTERNAL MEDICINE      Vianney Soto MD  3/29/2022  5:18 PM    Copy sent to Dr. Vianney Soto MD    Please note that this chart was generated using voice recognition Dragon dictation software. Although every effort was made to ensure the accuracy of this automated transcription, some errors in transcription may have occurred.

## 2022-03-29 NOTE — PROGRESS NOTES
Patient experiencing oxygen saturations in the 88-90% range on room air. During rounds, Dr. Karol Angel examined patient and ordered the following STAT: CXR, cardiac enzyme labs, and EKG. Writer will also inform Internal Medicine. Writer applied 2 L of oxygen on patient per Dr. Edmondson Hard order. Patient resting comfortably in bed.

## 2022-03-29 NOTE — PLAN OF CARE
Problem: Skin Integrity:  Goal: Will show no infection signs and symptoms  Description: Will show no infection signs and symptoms  3/29/2022 1409 by Coni Kawasaki, RN  Outcome: Ongoing  Note: There are no new skin issues so far this shift. Will continue to assist patient with repositioning at least every two hours. Problem: Falls - Risk of:  Goal: Will remain free from falls  Description: Will remain free from falls  3/29/2022 1409 by Coni Kawasaki, RN  Outcome: Ongoing  Note: Patient remains free from falls so far this shift. Will continue to round at least hourly, place bed in lowest position with call light within reach, and alarm activated. Problem: Pain:  Goal: Control of acute pain  Description: Control of acute pain  3/29/2022 1409 by Coni Kawasaki, RN  Outcome: Ongoing  Note: Patient's pain is controlled with the use or prn pain meds and lidocaine patch. Will continue to assess.

## 2022-03-29 NOTE — DISCHARGE INSTR - COC
Continuity of Care Form    Patient Name: Jacki Sloan   :  1950  MRN:  [id number]    Admit date:  3/23/2022  Discharge date:  22    Code Status Order: Full Code   Advance Directives:      Admitting Physician:  Brigitte Brito MD  PCP: Ronda Flores MD    Discharging Nurse: *[facility] Unit/Room#: 6688/8844-81  Discharging Unit Phone Number: [phone]    Emergency Contact:   Extended Emergency Contact Information  Primary Emergency Contact: Juanita Ackerman  Address: [address] [place] [address] [address], [address] Phone: [phone]  Mobile Phone: [phone]  Relation: Spouse  Hearing or visual needs: None  Other needs: None  Preferred language: [place]   needed?  No  Secondary Emergency Contact: Annalise Singh  Home Phone: [phone]  Mobile Phone: [phone]  Relation: Child    Past Surgical History:  Past Surgical History:   Procedure Laterality Date    ABDOMEN SURGERY      benign tumor removed near remaining ovary, 1.5 pounds    APPENDECTOMY      appendix ruptured, developed peritonitis    BACK SURGERY      BUNIONECTOMY Left     along with calcium deposits removed    [address] [facility]  2005    negative    CERVICAL FUSION  2021    POSTERIOR C3-6 LAMINECTOMY, PARTIAL C7 LAMINECTOMY, FUSION C3-C6, SILVERCORD    CERVICAL FUSION N/A 2021    POSTERIOR C3-6 LAMINECTOMY, PARTIAL C7 LAMINECTOMY, FUSION C3-C6, SILVERCORD performed by Antwan Rodriguez DO at [address]    12 INCHES REMOVED D/T OBSTRUCTION    COLONOSCOPY      CYST REMOVAL Right     right facial    HYSTERECTOMY  1973    taken as a result of recurring cysts    LUMBAR FUSION N/A 02/10/2020    LUMBAR L4-5 POSTERIOR  DECOMPRESSION INSTRUMENTATION FUSION WCEMENT AUGMENTATION/ performed by Teressa Acevedo MD at [facility] 2020    L5-S1 PLIF L4-L5 REVISION performed by Teressa Acevedo MD at [address] [place] [address] SURGICAL HISTORY  08/14/2014    FESS    OVARY REMOVAL  1970    UNILATERAL due to cyst    OVARY REMOVAL  1971    partial, due to cyst    SINUS SURGERY  2004    UPPER GASTROINTESTINAL ENDOSCOPY N/A 05/31/2019    EGD ESOPHAGOGASTRODUODENOSCOPY performed by Kofi Evans MD at [address] [place] [address] N/A 08/05/2019    EGD BIOPSY performed by Brett Lott MD at [facility] N/A 08/23/2019    EGD BIOPSY performed by Kofi Evans MD at [address] Left 03/05/2019    WRIST OPEN REDUCTION INTERNAL FIXATION performed by Shon Myers MD at [facility] OR       Immunization History:   Immunization History   Administered Date(s) Administered    COVID-19, Pfizer Purple top, DILUTE for use, 12+ yrs, 30mcg/0.3mL dose 03/18/2021, 04/08/2021    Influenza, High Dose (Fluzone 65 yrs and older) 11/17/2017    PPD Test 03/25/2020, 04/04/2020    Pneumococcal Conjugate 13-valent ([id number]) 05/23/2017    Pneumococcal Polysaccharide ([id number]) 05/10/2016       Active Problems:  Patient Active Problem List   Diagnosis Code    Other specified disorders of rotator cuff syndrome of shoulder and allied disorders M75.100    Diverticulosis of large intestine K57.30    Intestinal or peritoneal adhesions with obstruction (postoperative) (postinfection) (HCC) K56.50    Restless legs syndrome (RLS) G25.81    GERD (gastroesophageal reflux disease) K21.9    Essential hypertension I10    Mixed hyperlipidemia E78.2    Other abnormal glucose R73.09    Atherosclerosis I70.90    Allergic rhinitis J30.9    Vitamin D deficiency E55.9    Depression F32. A    Degenerative joint disease (DJD) of hip M16.9    Peripheral edema R60.9    Injury of foot, left D13.801B    Facial droop R29.810    CVA (cerebral vascular accident) ([facility] I63.9    Bradycardia R00.1    Ataxia R27.0    Aphasia R47.01    TIA (transient ischemic attack) G45.9    Need for prophylactic vaccination and inoculation against cholera alone Z23    Osteopenia M85.80    Lumbago M54.50    Meralgia paresthetica of right side G57.11    Lumbar degenerative disc disease M51.36    Spondylosis of lumbar region without myelopathy or radiculopathy M47.816    Blood poisoning [id number]    Cellulitis of left lower extremity L03.116    Encounter for medication monitoring Z51.81    Deep vein thrombosis (DVT) of right lower extremity (HCC) I82.401    Chronic deep vein thrombosis (DVT) of proximal vein of both lower extremities (HCC) I82.5Y3    Chronic diastolic heart failure ([facility]) I50.32    Neurogenic claudication due to lumbar spinal stenosis M48.062    Sacroiliitis ([facility]) M46.1    Stage 3 chronic kidney disease ([facility]) N18.30    Type 2 diabetes mellitus with circulatory disorder, without long-term current use of insulin ([facility]) E11.59    Chronic deep vein thrombosis (DVT) of popliteal vein of right lower extremity ([facility]) I82.531    Closed fracture of left wrist S62.102A    B12 deficiency E53.8    Iron deficiency anemia secondary to inadequate dietary iron intake D50.8    Closed head injury S09.90XA    Scalp laceration S01. 01XA    Abnormal finding on imaging R93.89    Other irritable bowel syndrome K58.8    Frequent falls R29.6    Double vision H53.2    Muscle soreness M79.10    Peptic ulcer K27.9    Melena K92.1    PUD (peptic ulcer disease) K27.9    Absolute anemia D64.9    Acute blood loss anemia D62    Acute renal failure (HCC) N17.9    Clostridium difficile infection A49.8    Acquired spondylolisthesis M43.10    Spinal stenosis of lumbar region with neurogenic claudication M48.062    Acute deep vein thrombosis (DVT) of proximal vein of left lower extremity ([facility]) I82.4Y2    Leg swelling M79.89    Back pain M54.9    Neurological disorder G98.8    Closed unstable burst fracture of fifth lumbar vertebra with routine healing S32.052D    Slow transit constipation K59.01    Major depressive disorder, recurrent, moderate (HCC) F33.1    Acute deep vein thrombosis (DVT) of femoral vein of left lower extremity (HCC) I82.412    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) M48.02, G99.2    Acute deep vein thrombosis (DVT) of right femoral vein ([facility]) I82.411    S/P cervical spinal fusion Z98.1    Gait instability R26.81    Cervical myelopathy (HCC) G95.9       Isolation/Infection:   Isolation            No Isolation          Patient Infection Status       Infection Onset Added Last Indicated Last Indicated By Review Planned Expiration Resolved Resolved By    None active    Resolved    COVID-19 (Rule Out) 05/17/21 05/17/21 05/17/21 COVID-19 (Ordered)   05/18/21 Rule-Out Test Resulted    COVID-19 (Rule Out) 04/12/21 04/13/21 04/12/21 COVID-19 (Ordered)   04/14/21 Rule-Out Test Resulted    COVID-19 04/25/20 04/25/20 04/25/20 COVID-19   06/18/20 Jignesh Alston RN    COVID-19 (Rule Out) 04/25/20 04/25/20 04/25/20 COVID-19 (Ordered)   04/25/20 Rule-Out Test Resulted            Nurse Assessment:  Last Vital Signs: /66   Pulse 67   Temp 99.2 °F (37.3 °C) (Oral)   Resp 18   Ht 5' 3\" (1.6 m)   Wt 180 lb (81.6 kg)   SpO2 91%   BMI 31.89 kg/m²     Last documented pain score (0-10 scale): Pain Level: 6  Last Weight:   Wt Readings from Last 1 Encounters:   03/26/22 180 lb (81.6 kg)     Mental Status:  oriented    IV Access:  - None    Nursing Mobility/ADLs:  Walking   Assisted  Transfer  Assisted  Bathing  Assisted  Dressing  Assisted  Toileting  Independent  Feeding  Assisted  Med Admin  Assisted  Med Delivery   whole    Wound Care Documentation and Therapy:        Elimination:  Continence: Bowel: Yes  Bladder: No  Urinary Catheter: None   Colostomy/Ileostomy/Ileal Conduit: No       Date of Last BM: 4/12/22    Intake/Output Summary (Last 24 hours) at 3/29/2022 1444  Last data filed at 3/29/2022 0730  Gross per 24 hour   Intake 600 ml   Output --   Net 600 ml     I/O last 3 completed shifts:   In: 720 [P.O.:720]  Out: 325 [Urine:325]    Safety Concerns:     History of Falls (last 30 days)    Impairments/Disabilities:      Vision    Nutrition Therapy:  Current Nutrition Therapy:   - Oral Diet:  Carb Control 4 carbs/meal (1800kcals/day)    Routes of Feeding: Oral  Liquids: Thin Liquids  Daily Fluid Restriction: no  Last Modified Barium Swallow with Video (Video Swallowing Test): not done    Treatments at the Time of Hospital Discharge:   Respiratory Treatments:Inhaler PRN  Oxygen Therapy:  is not on home oxygen therapy. Ventilator:    - No ventilator support    Rehab Therapies: Physical Therapy and Occupational Therapy  Weight Bearing Status/Restrictions: No weight bearing restrictions  Other Medical Equipment (for information only, NOT a DME order):  walker  Other Treatments:     Patient's personal belongings (please select all that are sent with patient):  Glasses    RN SIGNATURE:  Electronically signed by Malvin Agudelo RN on 4/12/22 at 12:16 PM EDT    CASE MANAGEMENT/SOCIAL WORK SECTION    Inpatient Status Date: 3/23/22    Readmission Risk Assessment Score:  Readmission Risk              Risk of Unplanned Readmission:  21           Discharging to Facility/ [address] [place] [address]  [phone]       Dialysis Facility (if applicable)   Name:  Address:  Dialysis Schedule:  Phone:  Fax:    / signature: Electronically signed by PRAFUL De Souza, LSW on 3/29/22 at 2:47 PM EDT    PHYSICIAN SECTION    Prognosis: Fair    Condition at Discharge: Stable    Rehab Potential (if transferring to Rehab): Good    Recommended Labs or Other Treatments After Discharge: Physical and occupational therapy for ongoing functional deficits related to cervical myelopathy s/p previous decompression and ataxia. Nursing for medication management. Please draw CBC/BMP in 1 week (around 4/18/22) and send results to PCP Dr. Simin Ramirez.     Physician Certification: I certify the above information and transfer of Cherry Quezada  is necessary for the continuing treatment of the diagnosis listed and that she requires Home Care for less than 30 days.      Update Admission H&P: No change in H&P    PHYSICIAN SIGNATURE:  Electronically signed by Danica Ngo MD on 4/7/22 at 8:26 PM EDT

## 2022-03-29 NOTE — PROGRESS NOTES
[facility]   OCCUPATIONAL THERAPY MISSED TREATMENT NOTE   ACUTE REHAB  Date: 3/29/22  Patient Name: Devi Sauer       Room: 9723/8277-96  MRN: [id number]   Account #: [de-identified]    : 1950  (71 y.o.)  Gender: female   Referring Practitioner: Ashwin Boyd MD  Diagnosis: Cervical myelopathy             REASON FOR MISSED TREATMENT:  Med hold  per nursing  request secondary to O2 desaturation.  Will continue to follow        SHWETA Emmanuel

## 2022-03-29 NOTE — PROGRESS NOTES
Physical Medicine & Rehabilitation  Progress Note    3/29/2022 10:24 AM     CC: Ambulatory and ADL dysfunction due to neuropathy with ataxia    Subjective:   Continues with substernal discomfort, continued cough, noted decrease O2 sat this am - 88%    ROS:  Denies fevers, chills, sweats. No chest pain, palpitations, lightheadedness. Denies coughing, wheezing or shortness of breath. Denies abdominal pain, nausea, diarrhea or constipation. No new areas of joint pain. Denies new areas of numbness or weakness. Denies new anxiety or depression issues. No new skin problems. Rehabilitation:   PT:  Restrictions/Precautions: General Precautions,Fall Risk  Implants present? : Metal implants ( Loop recorder)  Other position/activity restrictions: up as tolerated   Transfers  Sit to Stand: Contact guard assistance,Minimal Assistance (Marilyn from bed, CGA from chair)  Stand to sit: Contact guard assistance,Minimal Assistance (Marilyn from bed, CGA from chair)  Bed to Chair: Contact guard assistance  Stand Pivot Transfers: Contact guard assistance  Comment: RW used for transfers.   Ambulation 1  Surface: level tile  Device: Rolling Walker  Other Apparatus: Wheelchair follow  Assistance: Contact guard assistance  Quality of Gait: slow tawanda with step to pattern, flat foot LE at contact, decreased stance on L LE; downward gaze and cervical flexion  Gait Deviations: Decreased step length,Decreased step height,Shuffles,Slow Tawanda  Distance: 92 ft, 60 ft  Comments: pt needed to sit before getting back to her starting point          OT:  ADL  Equipment Provided: Reacher,Long-handled sponge (Dysem)  Feeding: Setup,Increased time to complete (pt reports diffulty keeping food on utensils )  Grooming: Setup (seated in wheelchair at sink )  UE Bathing: Supervision  LE Bathing: Supervision  UE Dressing: Stand by assistance  LE Dressing: Stand by assistance  Toileting: Minimal assistance,Increased time to complete (A with LB clothing mgmt)  Additional Comments: Pt SBA for functional mobility to/from bathroom with increased time. Pt required assistance to place wheelchair in shower to transfer to/from tub bench. Min A and VC's for transfers with Dysem and shoes utilized to address L foot slipping with sucess. Pt completed bathing tasks with Supervision utilizing weight shifting technique while seated in shower. Pt SBA for dressing with VC's on use of AE for LB dressing and dysem used to don shoes. Pt completed grooming at sink with Set up assistance. No LOB throughout self care this AM. PM: Pt observed to have increasingly labored breathing with wheezing sounds. SpO2 93%, RN Jj notified. Per MEENU Gardner, Respitory therapy contacted. RN okayed pt to participate in therapy if remaining in room. Pt engaged in hand strengthening activites while seated in recliner for ~10 minutes before stating pain increased to 10/10 as pt becomes tearful. pt observed to clench chest with towel for support as coughing increases. Pt requesting to return to bed. Pt transfered Max A with RW/Dysem under L foot to bed. Max A sit<>supine for BLE management. MEENU duran/CUCO De notified of pt report.          Balance  Sitting Balance: Supervision  Standing Balance: Contact guard assistance   Standing Balance  Time: ~1-2 minutes  Activity: transfers/self care  Comment: 1-2 UE support   Functional Mobility  Functional - Mobility Device: Wheelchair  Activity: To/from bathroom  Assist Level: Minimal assistance  Functional Mobility Comments: able to self propell to/from bathroom; assist required to place wheelchair up ramp to shower     Bed mobility  Rolling to Left: Minimal assistance  Rolling to Right: Minimal assistance  Supine to Sit: Minimal assistance  Sit to Supine: Maximum assistance  Scooting: Stand by assistance  Comment: Max A to return to bed for BLE management due to increased pain  Transfers  Stand Pivot Transfers: Maximum assistance (AM: Min A; PM: Max A)  Sit to stand: Minimal assistance  Stand to sit: Minimal assistance  Transfer Comments: 1-2 UE support; VC's for technique; Dysem placed under L foot and shoes on pt to prevent L foot slipping ; increased time to complete   Toilet Transfers  Toilet - Technique: Stand pivot  Equipment Used: Grab bars  Toilet Transfer: Minimal assistance  Toilet Transfers Comments: VC's for hand placement/technique      Shower Transfers  Shower - Transfer From: Wheelchair  Shower - Transfer Type: To and From  Shower - Transfer To: Shower seat with back  Shower - Technique: Stand pivot  Shower Transfers: Minimal assistance  Shower Transfers Comments: 1-2 UE support; VC's for technique; Dysem placed under L foot and shoes on pt to prevent L foot slipping ; increased time to complete  Wheelchair Bed Transfers  Wheelchair/Bed - Technique: Stand pivot  Equipment Used: Other (RW)  Level of Asssistance: Moderate assistance  Wheelchair Transfers Comments: with RW; Blocking of L foot due to slipping      ST:            Objective:  /66   Pulse 67   Temp 99.2 °F (37.3 °C) (Oral)   Resp 18   Ht 5' 3\" (1.6 m)   Wt 180 lb (81.6 kg)   SpO2 90%   BMI 31.89 kg/m²  I Body mass index is 31.89 kg/m². I   Wt Readings from Last 1 Encounters:   22 180 lb (81.6 kg)      Temp (24hrs), Av.9 °F (37.2 °C), Min:98.6 °F (37 °C), Max:99.2 °F (37.3 °C)         GEN: well developed, well nourished, no acute distress  HEENT: Normocephalic atraumatic, EOMI, mucous membranes pink and moist  CV: RRR, no murmurs, rubs or gallops  PULM: CTAB, no rales or rhonchi wheezes or crackles noted. Respirations WNL and unlabored, palpation sternal area reproduces sternal discomfort-no change  ABD: soft, NT, ND, +BS and equal  NEURO: A&O x3. Sensation intact to light touch. MSK: At least antigravity to 4 -/5 strength  EXTREMITIES: No calf tenderness to palpation bilaterally.  No edema BLEs  SKIN: warm dry and intact with good turgor  PSYCH: appropriately interactive. Affect WNL. Medications   Scheduled Meds:   ipratropium-albuterol  1 ampule Inhalation 4x daily    losartan  100 mg Oral Daily    amLODIPine  5 mg Oral Daily    gabapentin  200 mg Oral BID    lidocaine  1 patch TransDERmal Daily    busPIRone  5 mg Oral TID    apixaban  5 mg Oral BID    atorvastatin  40 mg Oral Nightly    calcium carbonate w/vitamin D  1 tablet Oral Daily    carvedilol  12.5 mg Oral BID    citalopram  20 mg Oral Daily    fenofibrate  160 mg Oral Daily    ferrous sulfate  325 mg Oral BID    furosemide  20 mg Oral BID    pramipexole  0.5 mg Oral Nightly    cyanocobalamin  1,000 mcg Oral Daily    polyethylene glycol  17 g Oral Daily     Continuous Infusions:  PRN Meds:.albuterol, guaiFENesin, traMADol, melatonin, magnesium hydroxide, docusate sodium, acetaminophen, senna, bisacodyl     Diagnostics:     CBC:   Recent Labs     03/28/22  0701   WBC 5.1   RBC 3.25*   HGB 10.2*   HCT 30.6*   MCV 94.4   RDW 13.8        BMP:   Recent Labs     03/28/22  0701      K 4.5      CO2 24   BUN 56*   CREATININE 1.24*     BNP: No results for input(s): BNP in the last 72 hours. PT/INR: No results for input(s): PROTIME, INR in the last 72 hours. APTT: No results for input(s): APTT in the last 72 hours. CARDIAC ENZYMES: No results for input(s): CKMB, CKMBINDEX, TROPONINT in the last 72 hours. Invalid input(s): CKTOTAL;3  FASTING LIPID PANEL:  Lab Results   Component Value Date    CHOL 103 05/20/2019    HDL 74 03/12/2021    TRIG 66 05/20/2019     LIVER PROFILE: No results for input(s): AST, ALT, ALB, BILIDIR, BILITOT, ALKPHOS in the last 72 hours. I/O (24Hr): Intake/Output Summary (Last 24 hours) at 3/29/2022 1024  Last data filed at 3/29/2022 0730  Gross per 24 hour   Intake 600 ml   Output --   Net 600 ml       Glu last 24 hour  No results for input(s): POCGLU in the last 72 hours.     No results for input(s): [facility], [address] [address], [address] [place] [address], [facility], [address] [facility], Jav[place], BACTERIA, NITRU, WBCUA, LEUKOCYTESUR, YEAST, Caleen Douse in the last 72 hours. Chest x-ray 3/23/2022  Old left rib fractures noted.  Examination is otherwise unremarkable.         Chest x-ray 3/27  Impression:        Stable appearance of the chest without acute cardiopulmonary process   identified. Impression/Plan:    1. Ataxia with L sided weakness:  PT/OT for gait, mobility, strengthening, endurance, ADLs, and self care. On Pramipexole  2. Cervical myelopathy: Hx C3-6 decompression. Has gabapentin and prn Tylenol for pain. 3. Cough and sternal discomfort reproducible -  may be due to limited deep breaths due to sternal discomfort,costochondritis, or lisinopril (discontinued by internal medicine- on 3/28) internal medicine following -chest x-ray negative 3/27 -suspect costochondritis-reproducible with palpation, discussed anti-inflammatory-patient notes unable to have due to kidney function, continue Ultram-- today with decreased O2 sat 88% cont chest discomfort sternal as above considering patient history will recheck chest x-ray EKG and cardiac enzymes and notify internal medicine placed on supplemental oxygen 1 to 2 L  4. Chronic diastolic heart failure/HTN/CAD: on amlodipine, lipitor, carvedilol, fenofibrate, , furosemide-Norvasc decreased, discontinued and started on Cozaar by internal medicine, monitor, chest x-ray negative  5. Major depressive disorder: on celexa. Monitor for now -addition of BuSpar by internal medicine, consider psych evaluation if patient agreeable-patient does not want psych evaluation at this time, denies suicidal /homicidal ideation  6. Chronic DVT: on eliquis - monitor with history of falls  7. Anemia: Hb near normal. On ferrous sulfate. Monitor ,trending down 12.0-11 0.4-10.2 check stool  8. Lower extremity cellulitis: completed Keflex   9. BIN: stable. Monitoring BMP,  on Lasix lisinopril  10. Restless left leg -Mirapex  11.  Insomnia: has melatonin prn   12. Pain-Lidoderm patch to right shoulder Neurontin  13. Bowel Management: Miralax daily, senokot prn, dulcolax prn. 14. DVT Prophylaxis:  SCD's while in bed, PRAVEEN's  and Eliquis  15. Internal medicine for medical management        Simon Atkins. Shy Mao MD       This note is created with the assistance of a speech recognition program.  While intending to generate a document that actually reflects the content of the visit, the document can still have some errors including those of syntax and sound a like substitutions which may escape proof reading.   In such instances, actual meaning can be extrapolated by contextual diversion

## 2022-03-29 NOTE — CARE COORDINATION
discussed discharge plan with the pt. She plans to go home and requested to have vjs. She had [facility] and would like to be referred back to them.   made a referral

## 2022-03-29 NOTE — PROGRESS NOTES
[address]   ACUTE REHABILITATION OCCUPATIONAL THERAPY  DAILY NOTE    Date: 3/29/22  Patient Name: Fredy Murphy      Room: 1013/3013-34    MRN: [id number]   : 1950  (71 y.o.)  Gender: female   Referring Practitioner: Juancarlos Alan MD  Diagnosis: Cervical myelopathy  Additional Pertinent Hx: 71 y.o.  female, with a history of anemia, spondylolisthesis, chronic DVT, chronic diastolic heart failure, CVA, major depressive disorder, s/p cervical spine fusion, stenosis of cervical spine with myelopathy, and DM type II, who presents with leg pain, shortness of breath, fall, and neck pain. According to patient and her , patient has had multiple falls recently including a fall Monday and evening and again on Tuesday. Patient reports that today she felt extremely weak upon waking. Restrictions  Restrictions/Precautions: General Precautions,Fall Risk  Implants present? : Metal implants (Loop recorder)  Other position/activity restrictions: up as tolerated  Required Braces or Orthoses?: No  Equipment Used: Other (RW)    Subjective  Subjective: \"When I cough\" Pt states in regards to pain level   Comments: Pt pleasent and agreeable for OT treatment.  and Gilberto present for assessment during tx. NSG requesting to hold all therapies this date, therapy session terminated early. Patient Currently in Pain: Yes  Pain Level: 10  Pain Location: Chest  Restrictions/Precautions: General Precautions; Fall Risk  Overall Orientation Status: Within Functional Limits     Pain Assessment  Pain Assessment: 0-10  Pain Level: 10  Pain Type: Acute pain  Pain Location: Chest    Objective     Balance  Sitting Balance: Contact guard assistance (While seated EOB with slight posterior lean req vc for posture correction, pt desat to 88% with recovery of <30 seconds in order to get reading of 90%, pt req vc and demonstration on pursed lip breathing to increase O2 intake F return noted.  NSG present during treatment vitals assessed place on 1L of O2 requesting med hold.)  Bed mobility  Rolling to Left: Minimal assistance  Rolling to Right: Minimal assistance  Supine to Sit: Minimal assistance (A trunk in upright position )  Sit to Supine: Moderate assistance (A BLEs )  Scooting: Minimal assistance  Comment: MOD A to return to bed for BLE management due to increased pain and fatigue                                    ADL  Grooming: None  UE Bathing: None  LE Bathing: None  UE Dressing: None  LE Dressing: None  Toileting: None  Additional Comments: Therapy session terminated early           Assessment  Performance deficits / Impairments: Decreased functional mobility ; Decreased ADL status; Decreased strength;Decreased endurance;Decreased sensation;Decreased balance;Decreased high-level IADLs;Decreased fine motor control;Decreased coordination  Prognosis: Good  Discharge Recommendations: Patient would benefit from continued therapy after discharge  Activity Tolerance: Patient Tolerated treatment well  Safety Devices in place: Yes  Type of devices: Gait belt;Patient at risk for falls; Left in chair;Call light within reach; Left in bed;Nurse notified  Restraints  Initially in place: No          Patient Education:  Patient Goals   Patient goals : \"To be able to put my pants/socks on better\"  Learner:patient  Method: explanation       Outcome: needs reinforcement        Plan  Plan  Times per week: 5-7  Times per day: Twice a day  Current Treatment Recommendations: Balance Training,Functional Mobility Training,Endurance Training,Strengthening,ROM,Safety Education & Training,Patient/Caregiver Education & Training,Equipment Evaluation, Education, & procurement,Self-Care / [place] Management  Patient Goals   Patient goals :  \"To be able to put my pants/socks on better\"  Short term goals  Time Frame for Short term goals: 1 week   Short term goal 1: Pt will complete LB dressing/bathing/toileting CGA with Good safety with use of AE/DME/modified techniques for increased IND with self care  Short term goal 2: Pt will participate in 30+ minutes functional activity for increased strength/balance/endurance for increased IND in ADL's  Short term goal 3: Pt will complete transfers/functional mobility CGA with Good safety and RW for increased IND in ADL's  Short term goal 4: Pt will verbalize/demonstrate Good understanding of AE/DME/modified techniques for increased IND in self care  Short term goal 5: Pt will complete UB bathing/dressing/grooming with Supervision for increased IND in self care  Long term goals  Time Frame for Long term goals : by discharge   Long term goal 1: Pt will complete toileting/grooming/dressing Mod I and bathing with Supervison with Good safety with use of AE/DME/modified techniques for increased IND with self care  Long term goal 2: Pt will complete functional mobility/transferes during functional activity Mod I with Good safety and RW for increased IND in ADL's  Long term goal 3: Pt will verbalize/demonstrate Good understanding of fall prevention strategies for increased safety/IND in self care  Long term goal 4: Pt will improve L hand strength for self care as evidence by a 3# improvement in dynomometor testing   Long term goal 5: Pt will improve L FMC as evidence by a 20 second improvement on 9 hole peg test for increased IND with self care        03/29/22 0945   OT Individual Minutes   Time In 0945   Time Out 0957   Minutes 12   Minute Variance   Variance 78   Reason Med Hold     Electronically signed by SHWETA Montemayor on 3/29/22 at 4:27 PM EDT

## 2022-03-30 LAB
EKG ATRIAL RATE: 66 BPM
EKG P AXIS: 65 DEGREES
EKG P-R INTERVAL: 164 MS
EKG Q-T INTERVAL: 386 MS
EKG QRS DURATION: 82 MS
EKG QTC CALCULATION (BAZETT): 404 MS
EKG R AXIS: 36 DEGREES
EKG T AXIS: 42 DEGREES
EKG VENTRICULAR RATE: 66 BPM

## 2022-03-30 PROCEDURE — 6370000000 HC RX 637 (ALT 250 FOR IP): Performed by: PHYSICAL MEDICINE & REHABILITATION

## 2022-03-30 PROCEDURE — 6370000000 HC RX 637 (ALT 250 FOR IP): Performed by: INTERNAL MEDICINE

## 2022-03-30 PROCEDURE — 94664 DEMO&/EVAL PT USE INHALER: CPT

## 2022-03-30 PROCEDURE — 97530 THERAPEUTIC ACTIVITIES: CPT

## 2022-03-30 PROCEDURE — 94761 N-INVAS EAR/PLS OXIMETRY MLT: CPT

## 2022-03-30 PROCEDURE — 99232 SBSQ HOSP IP/OBS MODERATE 35: CPT | Performed by: PHYSICAL MEDICINE & REHABILITATION

## 2022-03-30 PROCEDURE — 97110 THERAPEUTIC EXERCISES: CPT

## 2022-03-30 PROCEDURE — 93010 ELECTROCARDIOGRAM REPORT: CPT | Performed by: INTERNAL MEDICINE

## 2022-03-30 PROCEDURE — 99232 SBSQ HOSP IP/OBS MODERATE 35: CPT | Performed by: INTERNAL MEDICINE

## 2022-03-30 PROCEDURE — 1180000000 HC REHAB R&B

## 2022-03-30 PROCEDURE — 97116 GAIT TRAINING THERAPY: CPT

## 2022-03-30 PROCEDURE — 94640 AIRWAY INHALATION TREATMENT: CPT

## 2022-03-30 PROCEDURE — 97535 SELF CARE MNGMENT TRAINING: CPT

## 2022-03-30 PROCEDURE — 6370000000 HC RX 637 (ALT 250 FOR IP): Performed by: NURSE PRACTITIONER

## 2022-03-30 PROCEDURE — 2500000003 HC RX 250 WO HCPCS: Performed by: PHYSICAL MEDICINE & REHABILITATION

## 2022-03-30 PROCEDURE — 2700000000 HC OXYGEN THERAPY PER DAY

## 2022-03-30 RX ADMIN — AMLODIPINE BESYLATE 5 MG: 5 TABLET ORAL at 08:09

## 2022-03-30 RX ADMIN — CARVEDILOL 12.5 MG: 12.5 TABLET, FILM COATED ORAL at 08:09

## 2022-03-30 RX ADMIN — LOSARTAN POTASSIUM 100 MG: 100 TABLET, FILM COATED ORAL at 08:09

## 2022-03-30 RX ADMIN — GABAPENTIN 200 MG: 100 CAPSULE ORAL at 20:15

## 2022-03-30 RX ADMIN — TRAMADOL HYDROCHLORIDE 50 MG: 50 TABLET, COATED ORAL at 20:15

## 2022-03-30 RX ADMIN — FUROSEMIDE 20 MG: 20 TABLET ORAL at 16:41

## 2022-03-30 RX ADMIN — POLYETHYLENE GLYCOL 3350 17 G: 17 POWDER, FOR SOLUTION ORAL at 08:10

## 2022-03-30 RX ADMIN — IPRATROPIUM BROMIDE AND ALBUTEROL SULFATE 1 AMPULE: 2.5; .5 SOLUTION RESPIRATORY (INHALATION) at 19:37

## 2022-03-30 RX ADMIN — IPRATROPIUM BROMIDE AND ALBUTEROL SULFATE 1 AMPULE: 2.5; .5 SOLUTION RESPIRATORY (INHALATION) at 12:05

## 2022-03-30 RX ADMIN — CYANOCOBALAMIN TAB 1000 MCG 1000 MCG: 1000 TAB at 08:09

## 2022-03-30 RX ADMIN — CARVEDILOL 12.5 MG: 12.5 TABLET, FILM COATED ORAL at 20:16

## 2022-03-30 RX ADMIN — GUAIFENESIN 100 MG: 200 SOLUTION ORAL at 06:14

## 2022-03-30 RX ADMIN — PRAMIPEXOLE DIHYDROCHLORIDE 0.5 MG: 0.25 TABLET ORAL at 20:16

## 2022-03-30 RX ADMIN — BUSPIRONE HYDROCHLORIDE 5 MG: 5 TABLET ORAL at 14:45

## 2022-03-30 RX ADMIN — FERROUS SULFATE TAB 325 MG (65 MG ELEMENTAL FE) 325 MG: 325 (65 FE) TAB at 20:15

## 2022-03-30 RX ADMIN — AZITHROMYCIN MONOHYDRATE 250 MG: 250 TABLET ORAL at 08:22

## 2022-03-30 RX ADMIN — BUSPIRONE HYDROCHLORIDE 5 MG: 5 TABLET ORAL at 20:14

## 2022-03-30 RX ADMIN — ATORVASTATIN CALCIUM 40 MG: 40 TABLET, FILM COATED ORAL at 20:16

## 2022-03-30 RX ADMIN — FUROSEMIDE 20 MG: 20 TABLET ORAL at 08:09

## 2022-03-30 RX ADMIN — APIXABAN 5 MG: 5 TABLET, FILM COATED ORAL at 08:09

## 2022-03-30 RX ADMIN — CITALOPRAM HYDROBROMIDE 20 MG: 20 TABLET ORAL at 08:09

## 2022-03-30 RX ADMIN — IPRATROPIUM BROMIDE AND ALBUTEROL SULFATE 1 AMPULE: 2.5; .5 SOLUTION RESPIRATORY (INHALATION) at 15:52

## 2022-03-30 RX ADMIN — Medication 6 MG: at 20:15

## 2022-03-30 RX ADMIN — IPRATROPIUM BROMIDE AND ALBUTEROL SULFATE 1 AMPULE: 2.5; .5 SOLUTION RESPIRATORY (INHALATION) at 07:40

## 2022-03-30 RX ADMIN — APIXABAN 5 MG: 5 TABLET, FILM COATED ORAL at 20:16

## 2022-03-30 RX ADMIN — FERROUS SULFATE TAB 325 MG (65 MG ELEMENTAL FE) 325 MG: 325 (65 FE) TAB at 08:09

## 2022-03-30 RX ADMIN — GUAIFENESIN 100 MG: 200 SOLUTION ORAL at 20:14

## 2022-03-30 RX ADMIN — GABAPENTIN 200 MG: 100 CAPSULE ORAL at 08:10

## 2022-03-30 RX ADMIN — PREDNISONE 40 MG: 20 TABLET ORAL at 08:09

## 2022-03-30 RX ADMIN — BUSPIRONE HYDROCHLORIDE 5 MG: 5 TABLET ORAL at 08:23

## 2022-03-30 RX ADMIN — FENOFIBRATE 160 MG: 160 TABLET ORAL at 08:09

## 2022-03-30 RX ADMIN — CALCIUM CARBONATE 600 MG (1,500 MG)-VITAMIN D3 400 UNIT TABLET 1 TABLET: at 08:09

## 2022-03-30 ASSESSMENT — PAIN SCALES - GENERAL
PAINLEVEL_OUTOF10: 8
PAINLEVEL_OUTOF10: 0
PAINLEVEL_OUTOF10: 8

## 2022-03-30 ASSESSMENT — PAIN DESCRIPTION - PAIN TYPE: TYPE: ACUTE PAIN

## 2022-03-30 ASSESSMENT — PAIN DESCRIPTION - LOCATION: LOCATION: CHEST

## 2022-03-30 NOTE — PROGRESS NOTES
Physical Therapy  Facility/Department: [facility] ACUTE REHAB  Daily Treatment Note  NAME: Alfonso Mullen  : 1950  MRN: [id number]    Date of Service: 3/30/2022    Discharge Recommendations:  Patient would benefit from continued therapy after discharge        Assessment      PT Education: General Safety;Gait Training;Transfer Training; Adaptive Device Training; Injury Prevention;Pressure Relief; Functional Mobility Training;Energy Conservation;Precautions  Activity Tolerance  Activity Tolerance: Patient limited by pain; Patient limited by fatigue;Patient limited by endurance  Activity Tolerance: severe cough and ribcage pain     Patient Diagnosis(es): There were no encounter diagnoses. has a past medical history of Allergic rhinitis, cause unspecified, Back pain, Bowel obstruction (HCC), C. difficile diarrhea, CAD (coronary artery disease), Cardiac murmur, Cellulitis, Cellulitis, Cerebral artery occlusion with cerebral infarction ([address] COVID-19, Diverticulosis of colon (without mention of hemorrhage), GERD (gastroesophageal reflux disease), GERD (gastroesophageal reflux disease), History of blood transfusion, History of CHF (congestive heart failure), History of MI (myocardial infarction), History of ovarian cyst, History of peritonitis, HTN (hypertension), Hx of blood clots, Hyperlipidemia, Intestinal or peritoneal adhesions with obstruction (postoperative) (postinfection) ([address] Kidney infection, Lateral epicondylitis  of elbow, MDRO (multiple drug resistant organisms) resistance, Muscle strain, Other abnormal glucose, PONV (postoperative nausea and vomiting), Pre-diabetes, Restless legs syndrome (RLS), Snores, 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[address] TIA (transient ischemic attack), Uses walker, Vitamin D deficiency, Wears glasses, and Wellness examination. has a past surgical history that includes Ovary removal (); colectomy (); Appendectomy (); Ovary removal ();  Hysterectomy (); Bunionectomy (Left); sinus surgery (2004); Colonoscopy; other surgical history (08/14/2014); cyst removal (Right); Wrist fracture surgery (Left, 03/05/2019); Upper gastrointestinal endoscopy (N/A, 05/31/2019); Upper gastrointestinal endoscopy (N/A, 08/05/2019); Upper gastrointestinal endoscopy (N/A, 08/23/2019); Abdomen surgery (1976); lumbar fusion (N/A, 02/10/2020); Cardiac catheterization; Cardiac catheterization (2005); [place] tooth extraction; lumbar fusion (N/A, 06/17/2020); back surgery; cervical fusion (05/21/2021); and cervical fusion (N/A, 5/21/2021). Restrictions  Restrictions/Precautions  Restrictions/Precautions: General Precautions,Fall Risk  Required Braces or Orthoses?: No  Implants present? : Metal implants ( Loop recorder)  Position Activity Restriction  Other position/activity restrictions: up as tolerated; O2 3L/m via NC     Subjective   General  Chart Reviewed: Yes  Additional Pertinent Hx: TIA  Response To Previous Treatment: Patient with no complaints from previous session. Family / Caregiver Present: No  Referring Practitioner: Dr Jina Silva. Subjective  Subjective: Pt reported that she slept well and ate well. Pt reported 0/10 pain at reat, but she has pain in her chest/ribs when she coughs. General Comment  Comments: 3/30: SpO2 94-96% at rest and while using NuStep at a slow pace and on workload 1  ;  3/29: Pt reported dizziness during AMB (PTA checked pt's SpO2 after she sat to rest and it was 90-91% on RA, PTA reported this to the RN and PM&R doctor.  Pt was put on med hold, to for rest and allow further assessment)  Pain Screening  Patient Currently in Pain: Other (comment) (Pt reported 0/10 pain at reat, but she has pain in her chest/ribs when she coughs)  Vital Signs  Patient Currently in Pain: Other (comment) (Pt reported 0/10 pain at reat, but she has pain in her chest/ribs when she coughs)  Oxygen Therapy  SpO2: 96 %  Pulse Oximeter Device Mode: Intermittent  Pulse Oximeter Device Location: Right;Finger  O2 Device: Nasal cannula  O2 Flow Rate (L/min): 3 L/min       Orientation  Orientation  Overall Orientation Status: Within Normal Limits    Objective   Bed mobility  Rolling to Left: Minimal assistance  Rolling to Right: Minimal assistance  Supine to Sit: Minimal assistance (A trunk in upright position )  Sit to Supine: Moderate assistance (A BLEs )  Transfers  Sit to Stand: Contact guard assistance;Minimal Assistance (CGA-Marilyn from chair)  Stand to sit: Contact guard assistance;Minimal Assistance (CGA-Marilyn from chair)  Bed to Chair: Maximum assistance (MaxA for bed to chair once today, pt started laughing, had pain, and lost her strength)  Stand Pivot Transfers: Contact guard assistance;Minimal Assistance (CGA-Marilyn STS from chair)  Comment: RW used for transfers  Ambulation  Ambulation?: Yes  Ambulation 1  Surface: level tile  Device: Rolling Walker  Other Apparatus: Wheelchair follow  Assistance: Contact guard assistance  Quality of Gait: slow tawanda with step to pattern, flat foot LE at contact, decreased stance on L LE; downward gaze and cervical flexion (slowly improving and becoming more aware, requiring less cues)  Gait Deviations: Decreased step length;Decreased step height;Shuffles; Slow Tawanda  Distance: 76 feet; + short distances between activities  Comments: Pt reported dizziness during AMB (PTA checked pt's SpO2 after she sat to rest and it was 90-91% on RA, PTA reported this to the RN and PM&R doctor.  Pt was put on med hold, to for rest and allow further assessment)  Stairs/Curb  Stairs?: Yes  Stairs  # Steps : 10  Stairs Height:  (4\"/6\")  Rails: Bilateral  Device: No Device  Assistance: Contact guard assistance  Comment: step-to pattern  Wheelchair Activities  Propulsion: Yes  Propulsion 1  Propulsion: Manual  Level: Level Tile  Method: RUE;LUE  Level of Assistance: Modified independent  Distance: short distances in the gym     Balance  Posture: Fair  Sitting - Static: Good  Sitting - Dynamic: Fair  Standing - Static: Fair  Standing - Dynamic: Fair;-  Comments: Standing with RW. Other exercises  Other exercises?: Yes  Other exercises 2: Seated Ex: BLE x20 reps with #2 and green tband, hip adduction squeezes with pillow  Other exercises 4: NuStep L1 x10 minutes (using BUE as tolerated)  Other exercises 5: Standing Ex: BLE x10 reps, in // bars  Other exercises 6: STS transfers: x8         Goals  Short term goals  Time Frame for Short term goals: 1 week  Short term goal 1: Pt to demostrate bed mobility CGA/Jennifer. Short term goal 2: Pt to perform transfers CGA. Short term goal 3: Pt to amb 100'-150' with device, CGA. Short term goal 4: Pt to improve BLE strength by 1/2 MMG. Short term goal 5: Pt to improve standing balance to [place]. Short term goal 6: Pt able to perform 4\" and 6\" steps x3 in //bars or acute stair way, with 2 B UE support, min A  Long term goals  Time Frame for Long term goals : By DC  Long term goal 1: Pt able to perform bed mobility at SBA  Long term goal 2: Pt able to transfer at supervsion level. Long term goal 3:  Pt able to ambulate house hold distances with assistive device mod-I  Long term goal 4:  Pt able to ambulate distance of 150 ft, level surfaces and shorter distance outside terraine at SBA. Long term goal 5: Pt able to propel w/c on level surface, distance of 150 ft, supervsion level. Long term goal 6: Improve 2MWT distance to atleast 120 ft to improve overall fucntion. Long term goal 7: Pt to improve Tinetti balance score to atlest 20/28 to reduce fall risk. Long term goal 8: Pt donny to perform cub step with B UE support and/or donny to goup and down 4 to 5 steps with 2 rails at min A   Patient Goals   Patient goals : To get stronger    Plan    Plan  Times per week: 1.5 hr/day, 5 to 7 days/week.   Specific instructions for Next Treatment: transfers, amb, balance, standing program  Current Treatment Recommendations: Strengthening,Balance Training,Functional Mobility Training,Transfer Training,Endurance Training,Gait Training,Equipment Evaluation, Education, & procurement,Patient/Caregiver Education & Training,Safety Education & Training,Stair training,Wheelchair Mobility Training  Safety Devices  Type of devices:  All fall risk precautions in place,Call light within reach,Gait belt,Patient at risk for falls,Nurse notified,Left in chair,Chair alarm in place     Therapy Time     03/30/22 0830 03/30/22 1333   PT Individual Minutes   Time In 0830 1333   Time Out 0907 [address] [place] [address]

## 2022-03-30 NOTE — PROGRESS NOTES
Physical Medicine & Rehabilitation  Progress Note    3/30/2022 11:26 AM     CC: Ambulatory and ADL dysfunction due to neuropathy with ataxia    Subjective:   O2 3 L, some cough. ROS:  Denies fevers, chills, sweats. No chest pain, palpitations, lightheadedness. Denies coughing, wheezing or shortness of breath. Denies abdominal pain, nausea, diarrhea or constipation. No new areas of joint pain. Denies new areas of numbness or weakness. Denies new anxiety or depression issues. No new skin problems. Rehabilitation:   PT:  Restrictions/Precautions: General Precautions,Fall Risk  Implants present? : Metal implants ( Loop recorder)  Other position/activity restrictions: up as tolerated   Transfers  Sit to Stand: Contact guard assistance,Minimal Assistance (CGA-Marilyn from chair)  Stand to sit: Contact guard assistance,Minimal Assistance (CGA-Marilyn from chair)  Bed to Chair: Contact guard assistance,Minimal assistance (CGA-Marilyn STS from chair)  Stand Pivot Transfers: Contact guard assistance,Minimal Assistance (CGA-Marilyn STS from chair)  Comment: RW used for transfers  Ambulation 1  Surface: level tile  Device: Rolling Walker  Other Apparatus: Wheelchair follow  Assistance: Contact guard assistance  Quality of Gait: slow tawanda with step to pattern, flat foot LE at contact, decreased stance on L LE; downward gaze and cervical flexion  Gait Deviations: Decreased step length,Decreased step height,Shuffles,Slow Tawanda  Distance: 94 feet  Comments: Pt reported dizziness during AMB (PTA checked pt's SpO2 after she sat to rest and it was 90-91% on RA, PTA reported this to the RN and PM&R doctor.  Pt was put on med hold, to for rest and allow further assessment)          OT:  ADL  Equipment Provided: Reacher,Long-handled sponge  Feeding: Setup,Increased time to complete (pt reports diffulty keeping food on utensils )  Grooming: None  UE Bathing: None  LE Bathing: None  UE Dressing: None  LE Dressing: None  Toileting: None  Additional Comments: Therapy session terminated early          Balance  Sitting Balance: Supervision  Standing Balance: Contact guard assistance   Standing Balance  Time: ~1-2 minutes  Activity: transfers/self care  Comment: 1-2 UE support   Functional Mobility  Functional - Mobility Device: Wheelchair  Activity: To/from bathroom  Assist Level: Minimal assistance  Functional Mobility Comments: able to self propell to/from bathroom; assist required to place wheelchair up ramp to shower     Bed mobility  Rolling to Left: Minimal assistance  Rolling to Right: Minimal assistance  Supine to Sit: Minimal assistance (A trunk in upright position )  Sit to Supine: Moderate assistance (A BLEs )  Scooting: Minimal assistance  Comment: MOD A to return to bed for BLE management due to increased pain and fatigue  Transfers  Stand Pivot Transfers: Maximum assistance (AM: Min A; PM: Max A)  Sit to stand: Minimal assistance  Stand to sit: Minimal assistance  Transfer Comments: 1-2 UE support; VC's for technique; Dysem placed under L foot and shoes on pt to prevent L foot slipping ; increased time to complete   Toilet Transfers  Toilet - Technique: Stand pivot  Equipment Used: Grab bars  Toilet Transfer: Minimal assistance  Toilet Transfers Comments: VC's for hand placement/technique      Shower Transfers  Shower - Transfer From: Wheelchair  Shower - Transfer Type: To and From  Shower - Transfer To: Shower seat with back  Shower - Technique: Stand pivot  Shower Transfers: Minimal assistance  Shower Transfers Comments: 1-2 UE support; VC's for technique; Dysem placed under L foot and shoes on pt to prevent L foot slipping ; increased time to complete  Wheelchair Bed Transfers  Wheelchair/Bed - Technique: Stand pivot  Equipment Used: Other (RW)  Level of Asssistance:  Moderate assistance  Wheelchair Transfers Comments: with RW; Blocking of L foot due to slipping      ST:            Objective:  /65   Pulse 60   Temp 97.6 °F (36.4 °C) (Oral)   Resp 18   Ht 5' 3\" (1.6 m)   Wt 183 lb 6.8 oz (83.2 kg)   SpO2 95%   BMI 32.49 kg/m²  I Body mass index is 32.49 kg/m². I   Wt Readings from Last 1 Encounters:   22 183 lb 6.8 oz (83.2 kg)      Temp (24hrs), Av °F (37.2 °C), Min:97.6 °F (36.4 °C), Max:100.9 °F (38.3 °C)         GEN: well developed, well nourished, no acute distress  HEENT: Normocephalic atraumatic, EOMI, mucous membranes pink and moist  CV: RRR, no murmurs, rubs or gallops  PULM:  Respirations WNL and unlabored, palpation sternal area reproduces sternal discomfort-no change  ABD: soft, NT, ND, +BS and equal  NEURO: A&O x3. Sensation intact to light touch. MSK: At least antigravity to 4 -/5 strength  EXTREMITIES: No calf tenderness to palpation bilaterally. No edema BLEs  SKIN: warm dry and intact with good turgor  PSYCH: appropriately interactive. Affect WNL.           Medications   Scheduled Meds:   predniSONE  40 mg Oral Daily    azithromycin  250 mg Oral Daily    ipratropium-albuterol  1 ampule Inhalation 4x daily    losartan  100 mg Oral Daily    amLODIPine  5 mg Oral Daily    gabapentin  200 mg Oral BID    lidocaine  1 patch TransDERmal Daily    busPIRone  5 mg Oral TID    apixaban  5 mg Oral BID    atorvastatin  40 mg Oral Nightly    calcium carbonate w/vitamin D  1 tablet Oral Daily    carvedilol  12.5 mg Oral BID    citalopram  20 mg Oral Daily    fenofibrate  160 mg Oral Daily    ferrous sulfate  325 mg Oral BID    furosemide  20 mg Oral BID    pramipexole  0.5 mg Oral Nightly    cyanocobalamin  1,000 mcg Oral Daily    polyethylene glycol  17 g Oral Daily     Continuous Infusions:  PRN Meds:.albuterol, guaiFENesin, traMADol, melatonin, magnesium hydroxide, docusate sodium, acetaminophen, senna, bisacodyl     Diagnostics:     CBC:   Recent Labs     22  0701   WBC 5.1   RBC 3.25*   HGB 10.2*   HCT 30.6*   MCV 94.4   RDW 13.8        BMP:   Recent Labs     22  0701   NA 138   K 4.5      CO2 24   BUN 56*   CREATININE 1.24*     BNP: No results for input(s): BNP in the last 72 hours. PT/INR: No results for input(s): PROTIME, INR in the last 72 hours. APTT: No results for input(s): APTT in the last 72 hours. CARDIAC ENZYMES: No results for input(s): CKMB, CKMBINDEX, TROPONINT in the last 72 hours. Invalid input(s): CKTOTAL;3  FASTING LIPID PANEL:  Lab Results   Component Value Date    CHOL 103 05/20/2019    HDL 74 03/12/2021    TRIG 66 05/20/2019     LIVER PROFILE: No results for input(s): AST, ALT, ALB, BILIDIR, BILITOT, ALKPHOS in the last 72 hours. I/O (24Hr): Intake/Output Summary (Last 24 hours) at 3/30/2022 1126  Last data filed at 3/29/2022 2329  Gross per 24 hour   Intake 240 ml   Output --   Net 240 ml       Glu last 24 hour  No results for input(s): POCGLU in the last 72 hours. No results for input(s): CLARITYU, COLORU, PHUR, SPECGRAV, PROTEINU, RBCUA, BLOODU, BACTERIA, NITRU, WBCUA, LEUKOCYTESUR, YEAST, GLUCOSEU, BILIRUBINUR in the last 72 hours. Chest x-ray 3/23/2022  Old left rib fractures noted.  Examination is otherwise unremarkable.         3/29/22 chest x-ray  Impression:        No acute cardiopulmonary disease           Impression/Plan:    1. Ataxia with L sided weakness:  PT/OT for gait, mobility, strengthening, endurance, ADLs, and self care. On Pramipexole discharge plan 4/6  2. Cervical myelopathy: Hx C3-6 decompression. Has gabapentin and prn Tylenol for pain. 3. Cough and sternal discomfort reproducible -costochondritis, patient unable to have anti-inflammatories-continue Ultram, cardiac work-up as yesterday-internal medicine evaluated, suspect bronchitis -on steroids and Zithromax due to oxygen  4. Chronic diastolic heart failure/HTN/CAD: on amlodipine, lipitor, carvedilol, fenofibrate, , furosemide-Norvasc decreased, discontinued and started on Cozaar by internal medicine, monitor, chest x-ray negative 3/29  5.  Major depressive disorder: on celexa. -addition of BuSpar by internal medicine, consider psych evaluation if patient agreeable-patient does not want psych evaluation at this time, denies suicidal /homicidal ideation continue to monitor  6. Chronic DVT: on eliquis - monitor with history of falls  7. Anemia: Hb near normal. On ferrous sulfate. Monitor ,trending down 12.0-11 0.4-10.2 check stool check 1 to 2 days  8. Lower extremity cellulitis: completed Keflex   9. BIN: stable. Monitoring BMP,  on Lasix lisinopril  10. Restless left leg -Mirapex  11. Insomnia: has melatonin prn   12. Pain-Lidoderm patch to right shoulder Neurontin  13. Bowel Management: Miralax daily, senokot prn, dulcolax prn. 14. DVT Prophylaxis:  SCD's while in bed, PRAVEEN's  and Eliquis  15. Internal medicine for medical management        Roma Burkitt. Linda Carlos MD       This note is created with the assistance of a speech recognition program.  While intending to generate a document that actually reflects the content of the visit, the document can still have some errors including those of syntax and sound a like substitutions which may escape proof reading.   In such instances, actual meaning can be extrapolated by contextual diversion

## 2022-03-30 NOTE — PROGRESS NOTES
Pt up since 5 am in chair feeling better still coughing. Cough medication given with minimal relief.

## 2022-03-30 NOTE — PROGRESS NOTES
[facility] Internal Medicine    CONSULTATION / HISTORY AND PHYSICAL EXAMINATION            Date:   3/30/2022  Patient name:  Burton Black  Date of admission:  3/23/2022  4:52 PM  MRN:   [id number]  Account:  [de-identified]  YOB: 1950  PCP:    Jovi Hernandez MD  Room:   [address] [place] [address]  Code Status:    Full Code    Physician Requesting Consult: Umu Madrid MD    Reason for Consult:  Medical management    Chief Complaint:     No chief complaint on file.   Debility    History Obtained From:     Patient, EMR, nursing staff    History of Present Illness:     67-year-old female initially presented to the multiple falls over several weeks in the setting of chronic cervical spine stenosis with myelopathy status post cervical fusion follows with neurosurgery  Recently started on Eliquis for acute DVT right leg  Trauma work-up CT brain in this admission unremarkable other than multiple remote and healing rib fractures, patient uses walker at home  Neurology review was obtained for worsening gait instability-MRI thoracic spine did show T2-T3 and T5-T7 moderate neural foraminal narrowing  Also patient was noted to have bilateral leg swelling with some stasis dermatitis, early cellulitis-started on diuresis as well as antibiotics  3/26   Patient feeling better  Feels that anxiety is better after starting buspar   Working with PT  3/28 ]  Patient complaining of cough, wheezing  She told the nurse that, cough is going on since she started lisinopril  Had wheezing, which improved with nebulization  3/29   Patient complaining of cough, shortness of breath, wheezing  Chest x-ray seen,  Had EKG, troponins which are flat  Past Medical History:     Past Medical History:   Diagnosis Date    Allergic rhinitis, cause unspecified     Back pain     lumbar    Bowel obstruction (HCC)     history of due to scar tissue, resolved non-surgically    C. difficile diarrhea     CAD (coronary artery disease)     no stent needed per pt.  Dr. Astrid Walters did cath at [facility]    Cardiac murmur     Cellulitis     left leg    Cellulitis 2017 August    leg left leg/bug bite    Cerebral artery occlusion with cerebral infarction [facility])     TIA 2014    COVID-19     ONE YR AGO IN 4/25/2020 fever and cough    Diverticulosis of colon (without mention of hemorrhage)     GERD (gastroesophageal reflux disease)     GERD (gastroesophageal reflux disease)     on rx    History of blood transfusion     approx 2020        History of CHF (congestive heart failure)     History of MI (myocardial infarction) 2005    thought due to a blood clot    History of ovarian cyst 1970    had oopherectomy holly    History of peritonitis 1968    due to ruptured appendix age 13    HTN (hypertension)     Hx of blood clots     right leg    Hyperlipidemia     Intestinal or peritoneal adhesions with obstruction (postoperative) (postinfection) ([address]     Kidney infection     renal failure/sepsis/spider bite    Lateral epicondylitis  of elbow     MDRO (multiple drug resistant organisms) resistance     c diff    Muscle strain     right posterior shoulder    Other abnormal glucose     PONV (postoperative nausea and vomiting)     dry heaves    Pre-diabetes     Restless legs syndrome (RLS)     Snores     no cpap    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)     TIA (transient ischemic attack) 2014    Uses walker     Vitamin D deficiency     Wears glasses     Wellness examination     last seen 2 weeks ago        Past Surgical History:     Past Surgical History:   Procedure Laterality Date    ABDOMEN SURGERY  1976    benign tumor removed near remaining ovary, 1.5 pounds    APPENDECTOMY  1968    appendix ruptured, developed peritonitis    BACK SURGERY      BUNIONECTOMY Left     along with calcium deposits removed   [address]  2005    negative    CERVICAL FUSION  05/21/2021 POSTERIOR C3-6 LAMINECTOMY, PARTIAL C7 LAMINECTOMY, FUSION C3-C6, SILVERCORD    CERVICAL FUSION N/A 5/21/2021    POSTERIOR C3-6 LAMINECTOMY, PARTIAL C7 LAMINECTOMY, FUSION C3-C6, SILVERCORD performed by Edi Day DO at [address] [facility] [address]    12 INCHES REMOVED D/T OBSTRUCTION    COLONOSCOPY      CYST REMOVAL Right     right facial    HYSTERECTOMY  1973    taken as a result of recurring cysts    LUMBAR FUSION N/A 02/10/2020    LUMBAR L4-5 POSTERIOR  DECOMPRESSION INSTRUMENTATION FUSION WCEMENT AUGMENTATION/ performed by Schuyler Granados MD at [facility] N/A 06/17/2020    L5-S1 PLIF L4-L5 REVISION performed by Schuyler Granados MD at [address] [place] [address]  08/14/2014    FESS    OVARY REMOVAL  1970    UNILATERAL due to cyst    OVARY REMOVAL  1971    partial, due to cyst    SINUS SURGERY  2004    UPPER GASTROINTESTINAL ENDOSCOPY N/A 05/31/2019    EGD ESOPHAGOGASTRODUODENOSCOPY performed by Kasi Driscoll MD at [address] [place] [address] N/A 08/05/2019    EGD BIOPSY performed by Kinsey Lidno MD at [address] [place] [address] N/A 08/23/2019    EGD BIOPSY performed by Kasi Driscoll MD at [address] 03/05/2019    WRIST OPEN REDUCTION INTERNAL FIXATION performed by Adriana Dunn MD at [address]        Medications Prior to Admission:     Prior to Admission medications    Medication Sig Start Date End Date Taking?  Authorizing Provider   amLODIPine (NORVASC) 10 MG tablet Take 1 tablet by mouth daily 3/10/22   Panda Hogan MD   furosemide (LASIX) 40 MG tablet Take 1 tablet by mouth 2 times daily 3/1/22   MAIK Zhu - CNP   carvedilol (COREG) 12.5 MG tablet Take 1 tablet by mouth 2 times daily 2/23/22   MAIK Zhu - CNP   apixaban (ELIQUIS) 5 MG TABS tablet Please take 2 tablets by mouth for the first week then take 1 tablet by mouth BID for acute DVT  Patient taking differently: Take 5 mg by mouth 2 times daily take 1 tablet by mouth BID for acute DVT 2/17/22   MAKI Gonzalez CNP   atorvastatin (LIPITOR) 80 MG tablet Take 0.5 tablets by mouth nightly 2/1/22   MAIK Gonzalez CNP   lisinopril (PRINIVIL;ZESTRIL) 30 MG tablet Take 1 tablet by mouth daily 1/21/22   Kathy Quinn MD   fenofibrate micronized (LOFIBRA) 134 MG capsule Take 1 capsule by mouth every morning (before breakfast) 1/13/22   Kathy Quinn MD   pramipexole (MIRAPEX) 0.5 MG tablet Take 1 tablet by mouth nightly 1/6/22   Kathy Quinn MD   citalopram (CELEXA) 20 MG tablet Take 1 tablet by mouth daily 1/3/22   Holley Shone, MD   nitroGLYCERIN (NITROSTAT) 0.4 MG SL tablet Place 1 tablet under the tongue every 5 minutes as needed for Chest pain 9/1/21   Kathy Quinn MD   docusate sodium (COLACE) 100 MG capsule Take 1 capsule by mouth 2 times daily as needed for Constipation 5/30/21   Carla Marley DO   diphenhydrAMINE HCl (BENADRYL ALLERGY PO) Take 1 capsule by mouth daily Takes q HS    Historical Provider, MD   cyanocobalamin ([employer] VITAMIN B12) 1000 MCG tablet Take 1 tablet by mouth daily 12/3/20   Kathy Quinn MD   ferrous sulfate (IRON 325) 325 (65 Fe) MG tablet Take 1 tablet by mouth 2 times daily 7/2/20   Carolina García MD   VITAMIN D, ERGOCALCIFEROL, PO Take by mouth    Historical Provider, MD   Lancets MISC 1 each by Does not apply route daily 10/10/19   Yanelis Oliva MD   blood glucose monitor strips Test 2 times a day & as needed for symptoms of irregular blood glucose. 10/10/19   Yanelis Oliva MD   Calcium Carbonate-Vitamin D (CALCIUM 500/D) 500-125 MG-UNIT TABS Take 1 tablet by mouth daily     Historical Provider, MD        Allergies:     Bactrim [sulfamethoxazole-trimethoprim], Codeine, and Seasonal    Social History:     Tobacco:    reports that she quit smoking about 4 years ago. Her smoking use included cigarettes.  She started smoking about 26 years ago. She has a 10.00 pack-year smoking history. She has never used smokeless tobacco.  Alcohol:      reports no history of alcohol use. Drug Use:  reports no history of drug use. Family History:     Family History   Problem Relation Age of Onset    Stroke Mother     Diabetes Mother     Heart Disease Mother     High Blood Pressure Mother     Heart Disease Father     Heart Disease Brother     High Blood Pressure Brother     Heart Disease Maternal Grandmother     High Blood Pressure Sister        Review of Systems:     Positive and Negative as described in HPI. CONSTITUTIONAL:  negative for fevers, chills, sweats, fatigue, weight loss  HEENT:  negative for vision, hearing changes, runny nose, throat pain  RESPIRATORY: Positive for cough, shortness of breath, wheezing  CARDIOVASCULAR: Positive for chest, worse with cough. GASTROINTESTINAL:  negative for nausea, vomiting, diarrhea, constipation, change in bowel habits, abdominal pain   GENITOURINARY:  negative for difficulty of urination, burning with urination, frequency   INTEGUMENT:  negative for rash, skin lesions, easy bruising   HEMATOLOGIC/LYMPHATIC:  negative for swelling/edema   ALLERGIC/IMMUNOLOGIC:  negative for urticaria , itching  ENDOCRINE:  negative increase in drinking, increase in urination, hot or cold intolerance  MUSCULOSKELETAL:  negative joint pains, muscle aches, swelling of joints  NEUROLOGICAL:  negative for headaches, dizziness, lightheadedness, numbness, pain, tingling extremities      Physical Exam:     /65   Pulse 60   Temp 97.6 °F (36.4 °C) (Oral)   Resp 18   Ht 5' 3\" (1.6 m)   Wt 180 lb 6.4 oz (81.8 kg)   SpO2 94%   BMI 31.96 kg/m²   Temp (24hrs), Av °F (37.2 °C), Min:97.6 °F (36.4 °C), Max:100.9 °F (38.3 °C)    No results for input(s): POCGLU in the last 72 hours.     Intake/Output Summary (Last 24 hours) at 3/30/2022 1742  Last data filed at 3/30/2022 1447  Gross per 24 hour   Intake 718 ml   Output 625 ml   Net 93 ml       General Appearance:  alert, well appearing, and in no acute distress  Head:  normocephalic, atraumatic. Eye: no icterus, redness, pupils equal and reactive, extraocular eye movements intact, conjunctiva clear  Ear: normal external ear, no discharge, hearing intact  Nose:  no drainage noted  Mouth: mucous membranes moist  Neck: supple, no carotid bruits, thyroid not palpable  Lungs: Air entry but decreased, better wheezing present  Cardiovascular: normal rate, regular rhythm, no murmur, gallop, rub. Abdomen: Soft, nontender, nondistended, normal bowel sounds, no hepatomegaly or splenomegaly  Neurologic: There are no new focal motor or sensory deficits, normal muscle tone and bulk, no abnormal sensation, normal speech, cranial nerves II through XII grossly intact  Skin: No gross lesions, rashes, bruising or bleeding on exposed skin area  Extremities:  peripheral pulses palpable, no pedal edema or calf pain with palpation  Psych: normal affect    Investigations:      Laboratory Testing:  No results found for this or any previous visit (from the past 24 hour(s)).     Imaging/Diagonstics:  Recent data reviewed    Assessment :      Primary Problem  Cervical myelopathy [facility])    Active Hospital Problems    Diagnosis Date Noted    Cervical myelopathy ([facility] [address] [G95.9] 03/17/2022       Plan:     Multiple falls, gait instability, multiple remote and healing rib fractures leading to chest pain-needs PT OT  Neural foraminal narrowing of thoracic spine-neurology as reviewed-recommend rehab and physical therapy  Hypertension-continue amlodipine, Coreg  Right leg DVT-continue Eliquis  Chronic diastolic CHF-currently compensated-continue Coreg, Lipitor, Lasix, lisinopril  Depression -patient noted to be very tearful today-is already on maximal dose of  Celexa, will add BuSpar    DVT prophylaxis-patient on Eliquis    3/27   But has readings of low blood pressure, asymptomatic  Reducing dose of Norvasc to 5 from 10    3/28   Patient has chronic cough, started since she is put on ACE inhibitor  May have ACE inhibitor induced cough, will discontinue lisinopril, start patient on Cozaar  Started patient given nebulization  Has history of smoking  Chest x-ray seen   3/29   Suspecting symptoms are due to acute bronchitis,  Ordering Z-Han, prednisone  EKG seen, troponins are flat  Chest x-ray seen  We will monitor  3/30   Patient shortness of breath is getting better being on steroids  We will plan to cut down oxygen  Consultations:     [address]       Jamey Fothergill, MD  3/30/2022  5:42 PM    Copy sent to Dr. Jamey Fothergill, MD    Please note that this chart was generated using voice recognition Dragon dictation software. Although every effort was made to ensure the accuracy of this automated transcription, some errors in transcription may have occurred.

## 2022-03-30 NOTE — PROGRESS NOTES
[facility]: SAROJ HINTON   ACUTE REHABILITATION OCCUPATIONAL THERAPY  DAILY NOTE    Date: 3/30/22  Patient Name: Daniel Killian      Room: 1645/7828-15    MRN: [id number]   : 1950  (71 y.o.)  Gender: female   Referring Practitioner: Gloria Dunham MD  Diagnosis: Cervical myelopathy  Additional Pertinent Hx: 71 y.o.  female, with a history of anemia, spondylolisthesis, chronic DVT, chronic diastolic heart failure, CVA, major depressive disorder, s/p cervical spine fusion, stenosis of cervical spine with myelopathy, and DM type II, who presents with leg pain, shortness of breath, fall, and neck pain. According to patient and her , patient has had multiple falls recently including a fall Monday and evening and again on Tuesday. Patient reports that today she felt extremely weak upon waking. Restrictions  Restrictions/Precautions: General Precautions,Fall Risk  Implants present? : Metal implants (Loop recorder)  Other position/activity restrictions: up as tolerated  Required Braces or Orthoses?: No  Equipment Used: Other (RW)    Subjective  Subjective: \"Only when I cough otherwise then no pain\"  Comments: Pt pleasent and agreeable for OT treatment. Patient Currently in Pain: Yes  Pain Level: 8  Pain Location: Chest (only when coughing )  Restrictions/Precautions: General Precautions; Fall Risk  Overall Orientation Status: Within Functional Limits  Patient Observation  Observations: LLE slipping upon standing requiring dycem under LLE and blocking for safety.   Pt on 3L of 02 per NC upon arrival. During tasks, pt able to maintain O2 with reading og 94%-95%       Objective  Cognition  Overall Cognitive Status: WFL  Perception  Overall Perceptual Status: WFL  Balance  Standing Balance: Contact guard assistance  Transfers  Stand Pivot Transfers: Contact guard assistance (vc for sequencing of transfer)  Sit to stand: Contact guard assistance  Stand to sit: Contact guard assistance  Transfer Comments: 1-2 UE support; VC's for technique; Dycem placed under L foot and shoes on pt to prevent L foot slipping ; increased time to complete  Standing Balance  Time: AM: <1 min x4, 1-2 mins x2; < 1min x2  Activity: transfers/self care  Comment: shower GB and toilet GB  for 1-2 UE support to decrease risk of falls, no LOB noted   Functional Mobility  Functional - Mobility Device: Wheelchair  Activity: To/from bathroom  Assist Level: Minimal assistance  Functional Mobility Comments: Able to self propel with BUEs however req assist for w/c mgt when entering/exiting shower in prep for SPT  Toilet Transfers  Toilet - Technique: Stand pivot  Equipment Used: Grab bars  Toilet Transfer: Minimal assistance  Toilet Transfers Comments: VC's for hand placement/technique   Shower Transfers  Shower - Transfer From: Wheelchair  Shower - Transfer Type: To and From  Shower - Transfer To: Shower seat with back  Shower - Technique: Stand pivot  Shower Transfers: Contact Guard  Shower Transfers Comments: 1-2 UE support; VC's for technique; Dysem placed under L foot and shoes on pt to prevent L foot slipping ; increased time to complete, No LOB noted      Type of ROM/Therapeutic Exercise  Type of ROM/Therapeutic Exercise: Free weights (2# BUE 10 reps)  Comment: BUE ex faciliatetd to support strength and endurance needed to participate in functional tasks/transfers safely and independently. Pt req RB PRN due to fatigue and weakness  Exercises  Scapular Protraction: x  Scapular Retraction: x  Shoulder Flexion: x  Shoulder Extension: x  Horizontal ABduction: x  Horizontal ADduction: x  Elbow Flexion: x  Elbow Extension: x                       ADL  Equipment Provided: Reacher;Long-handled sponge  Grooming: Setup (seated in w/c sinklevel for hair/oral care)  UE Bathing: Supervision;Setup (seated tub transfer bench )  LE Bathing: Contact guard assistance;Setup;Verbal cueing; Increased time to complete (CGA standing for brice care, seated for BLEs with LHS)  UE Dressing: Stand by assistance;Setup;Verbal cueing; Increased time to complete (VC for NC mgt when donning/doffing shirt)  LE Dressing: Setup;Verbal cueing; Increased time to complete;Minimal assistance;Contact guard assistance (A for TEDs and pull pants/underwear up/over buttocks, pt able to thread pants/underwear)  Toileting: Contact guard assistance;Stand by assistance (PM: seated weight shift L<>R for brice care, CGA pant mgt)  Additional Comments: During LE dressing pt req intermiitent VC for proper use of AE (reacher) to increase ease and independence in dressing task. Assessment  Performance deficits / Impairments: Decreased functional mobility ; Decreased ADL status; Decreased strength;Decreased endurance;Decreased sensation;Decreased balance;Decreased high-level IADLs;Decreased fine motor control;Decreased coordination  Prognosis: Good  Discharge Recommendations: Patient would benefit from continued therapy after discharge  Activity Tolerance: Patient Tolerated treatment well  Safety Devices in place: Yes  Type of devices: Gait belt;Patient at risk for falls; Left in chair;Call light within reach; Left in bed;Nurse notified  Restraints  Initially in place: No            Patient Goals   Patient goals : \"To be able to put my pants/socks on better\"  Learner:patient  Method: demonstration and explanation       Outcome: needs reinforcement        Plan  Plan  Times per week: 5-7  Times per day: Twice a day  Current Treatment Recommendations: Balance Training,Functional Mobility Training,Endurance Training,Strengthening,ROM,Safety Education & Training,Patient/Caregiver Education & Training,Equipment Evaluation, Education, & procurement,Self-Care / ADL,Pain Management  Patient Goals   Patient goals :  \"To be able to put my pants/socks on better\"  Short term goals  Time Frame for Short term goals: 1 week   Short term goal 1: Pt will complete LB dressing/bathing/toileting CGA with Good safety with use of AE/DME/modified techniques for increased IND with self care  Short term goal 2: Pt will participate in 30+ minutes functional activity for increased strength/balance/endurance for increased IND in ADL's  Short term goal 3: Pt will complete transfers/functional mobility CGA with Good safety and RW for increased IND in ADL's  Short term goal 4: Pt will verbalize/demonstrate Good understanding of AE/DME/modified techniques for increased IND in self care  Short term goal 5: Pt will complete UB bathing/dressing/grooming with Supervision for increased IND in self care  Long term goals  Time Frame for Long term goals : by discharge   Long term goal 1: Pt will complete toileting/grooming/dressing Mod I and bathing with Supervison with Good safety with use of AE/DME/modified techniques for increased IND with self care  Long term goal 2: Pt will complete functional mobility/transferes during functional activity Mod I with Good safety and RW for increased IND in ADL's  Long term goal 3: Pt will verbalize/demonstrate Good understanding of fall prevention strategies for increased safety/IND in self care  Long term goal 4: Pt will improve L hand strength for self care as evidence by a 3# improvement in dynomometor testing   Long term goal 5: Pt will improve L FMC as evidence by a 20 second improvement on 9 hole peg test for increased IND with self care        03/30/22 0907 03/30/22 1302   OT Individual Minutes   Time In 0907 1302   Time Out 1008 1333   Minutes 61 31     Electronically signed by SHWETA Cuadra on 3/30/22 at 3:24 PM EDT

## 2022-03-30 NOTE — PROGRESS NOTES
Pt in bed this shift had lower grade temp and treated with tylenol. Tylenol was effective. Pt began ATB with no s/s of adverse reaction. Pt had o2 on at 2.5 lpm and denies SOB. No cough noted this shift and pt resting comfortably.

## 2022-03-30 NOTE — PROGRESS NOTES
Timi Costa, [facility]. Cervical myelopathy ([facility] [address] [G95.9] . Vitals:    03/30/22 1205   BP:    Pulse:    Resp:    Temp:    SpO2: 94%   . Patients home meds are   Prior to Admission medications    Medication Sig Start Date End Date Taking?  Authorizing Provider   amLODIPine (NORVASC) 10 MG tablet Take 1 tablet by mouth daily 3/10/22   Kendall Vicente MD   furosemide (LASIX) 40 MG tablet Take 1 tablet by mouth 2 times daily 3/1/22   MAIK Villalta CNP   carvedilol (COREG) 12.5 MG tablet Take 1 tablet by mouth 2 times daily 2/23/22   MAIK Villalta CNP   apixaban (ELIQUIS) 5 MG TABS tablet Please take 2 tablets by mouth for the first week then take 1 tablet by mouth BID for acute DVT  Patient taking differently: Take 5 mg by mouth 2 times daily take 1 tablet by mouth BID for acute DVT 2/17/22   MAIK Villalta CNP   atorvastatin (LIPITOR) 80 MG tablet Take 0.5 tablets by mouth nightly 2/1/22   MAIK Villalta CNP   lisinopril (PRINIVIL;ZESTRIL) 30 MG tablet Take 1 tablet by mouth daily 1/21/22   Promise Song MD   fenofibrate micronized (LOFIBRA) 134 MG capsule Take 1 capsule by mouth every morning (before breakfast) 1/13/22   Promise Song MD   pramipexole (MIRAPEX) 0.5 MG tablet Take 1 tablet by mouth nightly 1/6/22   Promise Song MD   citalopram (CELEXA) 20 MG tablet Take 1 tablet by mouth daily 1/3/22   Bernice Krause MD   nitroGLYCERIN (NITROSTAT) 0.4 MG SL tablet Place 1 tablet under the tongue every 5 minutes as needed for Chest pain 9/1/21   Promise Song MD   docusate sodium (COLACE) 100 MG capsule Take 1 capsule by mouth 2 times daily as needed for Constipation 5/30/21   Maria Elena Marley,    diphenhydrAMINE HCl (BENADRYL ALLERGY PO) Take 1 capsule by mouth daily Takes q HS    Historical Provider, MD   cyanocobalamin ([employer] VITAMIN B12) 1000 MCG tablet Take 1 tablet by mouth daily 12/3/20   Promise Song MD   ferrous sulfate (IRON 325) 325 (65 Fe) MG tablet Take 1 tablet by mouth 2 times daily 7/2/20   Chuck Cha MD   VITAMIN D, ERGOCALCIFEROL, PO Take by mouth    Historical Provider, MD   Lancets MISC 1 each by Does not apply route daily 10/10/19   Tamera Pallas, MD   blood glucose monitor strips Test 2 times a day & as needed for symptoms of irregular blood glucose.  10/10/19   Tamera Pallas, MD   Calcium Carbonate-Vitamin D (CALCIUM 500/D) 500-125 MG-UNIT TABS Take 1 tablet by mouth daily     Historical Provider, MD       Assessment:     03/30/22 1209   RT Protocol   History Pulmonary Disease 1   Respiratory pattern 2   Breath sounds 2   Cough 0   Indications for Bronchodilator Therapy Decreased or absent breath sounds   Bronchodilator Assessment Score 5         HR - 72  RR - 18  SpO2 - 94% (3L NC)  Breath Sounds: Diminished      Bronchodilator assessment at level  2  Hyperinflation assessment at level 1  Secretion Management assessment at level  1    [x]    Bronchodilator Assessment  BRONCHODILATOR ASSESSMENT SCORE  Score 0 1 2 3 4 5   Breath Sounds   []  Patient Baseline []  No Wheeze good aeration [x]  Faint, scattered wheezing, good aeration []  Expiratory Wheezing and or moderately diminished []  Insp/Exp wheeze and/or very diminished []  Insp/Exp and/ or marked distress   Respiratory Rate   []  Patient Baseline []  Less than 20 [x]  Less than 20 []  20-25 []  Greater than 25 []  Greater than 25   Peak flow % of Pred or PB [x]  NA   []  Greater than 90%  []  81-90% []  71-80% []  Less than or equal to 70%  or unable to perform []  Unable due to Respiratory Distress   Dyspnea re []  Patient Baseline []  No SOB [x]  No SOB []  SOB on exertion []  SOB min activity []  At rest/acute   e FEV% Predicted       [x]  NA []  Above 69%  []  Unable []  Above 60-69%  []  Unable []  Above 50-59%  []  Unable []  Above 35-49%  []  Unable []  Less than 35%  []  Unable                 [x]  Hyperinflation Assessment  Score 1 2 3   CXR and Breath Sounds [x]  Clear []  No atelectasis  Basilar aeration []  Atelectasis or absent basilar breath sounds   Incentive Spirometry Volume  (Per IBW)   []  Greater than or equal to 15ml/Kg []  less than 15ml/Kg []  less than 15ml/Kg   Surgery within last 2 weeks [x]  None or general   []  Abdominal or thoracic surgery  []  Abdominal or thoracic   Chronic Pulmonary Historyre [x]  No [x]  Yes []  Yes     [x]  Secretion Management Assessment  Score 1 2 3   Bilateral Breath Sounds   []  Occasional Rhonchi []  Scattered Rhonchi []  Course Rhonchi and/or poor aeration   Sputum    []  Small amount of thin secretions []  Moderate amount of viscous secretions []  Copius, Viscious Yellow/ Secretions   CXR as reported by physician [x]  clear  []  Unavailable []  Infiltrates and/or consolidation  []  Unavailable []  Mucus Plugging and or lobar consolidation  []  Unavailable   Cough []  Strong, productive cough []  Weak productive cough []  No cough or weak non-productive cough   Janann August [facility]  12:09 PM

## 2022-03-30 NOTE — FLOWSHEET NOTE
Patient provided medical update and talked about her medical issues; comforted by prayer     03/30/22 1114   Encounter Summary   Services provided to: Patient   Referral/Consult From: Mike   Continue Visiting   (3/30/22)   Complexity of Encounter Moderate   Length of Encounter 15 minutes   Spiritual Assessment Completed Yes   Grief and Life Adjustment   Type Adjustment to illness   Assessment Approachable; Anxious; Hopeful;Coping;Helplessness   Intervention Active listening;Explored feelings, thoughts, concerns;Prayer;Sustaining presence/ Ministry of presence; Discussed illness/injury and it's impact   Outcome Expressed gratitude;Engaged in conversation;Expressed feelings/needs/concerns;Coping; Hopeful;Receptive

## 2022-03-30 NOTE — PLAN OF CARE
Problem: Skin Integrity:  Goal: Will show no infection signs and symptoms  Description: Will show no infection signs and symptoms  3/30/2022 0043 by Raj Shi RN  Outcome: Ongoing     Problem: Skin Integrity:  Goal: Absence of new skin breakdown  Description: Absence of new skin breakdown  Outcome: Ongoing     Problem: Pain:  Goal: Pain level will decrease  Description: Pain level will decrease  Outcome: Ongoing     Problem: Pain:  Goal: Control of acute pain  Description: Control of acute pain  3/30/2022 0043 by Raj Shi RN  Outcome: Ongoing     Problem: Mobility - Impaired:  Goal: Mobility will improve  Description: Mobility will improve  Outcome: Ongoing     Problem: Musculor/Skeletal Functional Status  Goal: Absence of falls  Outcome: Ongoing     Problem: Musculor/Skeletal Functional Status  Goal: Highest potential functional level  Outcome: Ongoing     Problem: Musculor/Skeletal Functional Status  Goal: Absence of falls  Outcome: Ongoing

## 2022-03-30 NOTE — PROGRESS NOTES
BRONCHOSPASM/BRONCHOCONSTRICTION     [x]         IMPROVE AERATION/BREATH SOUNDS  [x]   ADMINISTER BRONCHODILATOR THERAPY AS APPROPRIATE  [x]   ASSESS BREATH SOUNDS  []   IMPLEMENT AEROSOL/MDI PROTOCOL  [x]   PATIENT EDUCATION AS NEEDED    PROVIDE ADEQUATE OXYGENATION WITH ACCEPTABLE SP02/ABG'S    [x]  IDENTIFY APPROPRIATE OXYGEN THERAPY  [x]   MONITOR SP02/ABG'S AS NEEDED   [x]   PATIENT EDUCATION AS NEEDED    Pt currently sitting in chair on 3lnc SpO2 95% expiratory wheezing pre and post tx.  Through out

## 2022-03-31 LAB
ABSOLUTE EOS #: 0.1 K/UL (ref 0–0.4)
ABSOLUTE LYMPH #: 1.6 K/UL (ref 1–4.8)
ABSOLUTE MONO #: 0.6 K/UL (ref 0.1–1.3)
ANION GAP SERPL CALCULATED.3IONS-SCNC: 13 MMOL/L (ref 9–17)
BASOPHILS # BLD: 1 % (ref 0–2)
BASOPHILS ABSOLUTE: 0 K/UL (ref 0–0.2)
BUN BLDV-MCNC: 64 MG/DL (ref 8–23)
CALCIUM SERPL-MCNC: 9 MG/DL (ref 8.6–10.4)
CHLORIDE BLD-SCNC: 101 MMOL/L (ref 98–107)
CO2: 26 MMOL/L (ref 20–31)
CREAT SERPL-MCNC: 1.36 MG/DL (ref 0.5–0.9)
EOSINOPHILS RELATIVE PERCENT: 1 % (ref 0–4)
GFR AFRICAN AMERICAN: 46 ML/MIN
GFR NON-AFRICAN AMERICAN: 38 ML/MIN
GFR SERPL CREATININE-BSD FRML MDRD: ABNORMAL ML/MIN/{1.73_M2}
GLUCOSE BLD-MCNC: 104 MG/DL (ref 70–99)
HCT VFR BLD CALC: 31.5 % (ref 36–46)
HEMOGLOBIN: 10.7 G/DL (ref 12–16)
LYMPHOCYTES # BLD: 27 % (ref 24–44)
MCH RBC QN AUTO: 31.8 PG (ref 26–34)
MCHC RBC AUTO-ENTMCNC: 34 G/DL (ref 31–37)
MCV RBC AUTO: 93.6 FL (ref 80–100)
MONOCYTES # BLD: 10 % (ref 1–7)
PDW BLD-RTO: 13.7 % (ref 11.5–14.9)
PLATELET # BLD: 291 K/UL (ref 150–450)
PMV BLD AUTO: 7.3 FL (ref 6–12)
POTASSIUM SERPL-SCNC: 4.6 MMOL/L (ref 3.7–5.3)
RBC # BLD: 3.37 M/UL (ref 4–5.2)
SEG NEUTROPHILS: 61 % (ref 36–66)
SEGMENTED NEUTROPHILS ABSOLUTE COUNT: 3.6 K/UL (ref 1.3–9.1)
SODIUM BLD-SCNC: 140 MMOL/L (ref 135–144)
WBC # BLD: 5.8 K/UL (ref 3.5–11)

## 2022-03-31 PROCEDURE — 6370000000 HC RX 637 (ALT 250 FOR IP): Performed by: INTERNAL MEDICINE

## 2022-03-31 PROCEDURE — 6370000000 HC RX 637 (ALT 250 FOR IP): Performed by: NURSE PRACTITIONER

## 2022-03-31 PROCEDURE — 36415 COLL VENOUS BLD VENIPUNCTURE: CPT

## 2022-03-31 PROCEDURE — 97530 THERAPEUTIC ACTIVITIES: CPT

## 2022-03-31 PROCEDURE — 94640 AIRWAY INHALATION TREATMENT: CPT

## 2022-03-31 PROCEDURE — 2500000003 HC RX 250 WO HCPCS: Performed by: PHYSICAL MEDICINE & REHABILITATION

## 2022-03-31 PROCEDURE — 94761 N-INVAS EAR/PLS OXIMETRY MLT: CPT

## 2022-03-31 PROCEDURE — 99232 SBSQ HOSP IP/OBS MODERATE 35: CPT | Performed by: PHYSICAL MEDICINE & REHABILITATION

## 2022-03-31 PROCEDURE — 1180000000 HC REHAB R&B

## 2022-03-31 PROCEDURE — 97535 SELF CARE MNGMENT TRAINING: CPT

## 2022-03-31 PROCEDURE — 6370000000 HC RX 637 (ALT 250 FOR IP): Performed by: PHYSICAL MEDICINE & REHABILITATION

## 2022-03-31 PROCEDURE — 97110 THERAPEUTIC EXERCISES: CPT

## 2022-03-31 PROCEDURE — 85025 COMPLETE CBC W/AUTO DIFF WBC: CPT

## 2022-03-31 PROCEDURE — 2700000000 HC OXYGEN THERAPY PER DAY

## 2022-03-31 PROCEDURE — 80048 BASIC METABOLIC PNL TOTAL CA: CPT

## 2022-03-31 PROCEDURE — 97116 GAIT TRAINING THERAPY: CPT

## 2022-03-31 PROCEDURE — 99232 SBSQ HOSP IP/OBS MODERATE 35: CPT | Performed by: INTERNAL MEDICINE

## 2022-03-31 RX ADMIN — AMLODIPINE BESYLATE 5 MG: 5 TABLET ORAL at 07:58

## 2022-03-31 RX ADMIN — FUROSEMIDE 20 MG: 20 TABLET ORAL at 07:59

## 2022-03-31 RX ADMIN — FERROUS SULFATE TAB 325 MG (65 MG ELEMENTAL FE) 325 MG: 325 (65 FE) TAB at 07:58

## 2022-03-31 RX ADMIN — BUSPIRONE HYDROCHLORIDE 5 MG: 5 TABLET ORAL at 20:27

## 2022-03-31 RX ADMIN — GUAIFENESIN 100 MG: 200 SOLUTION ORAL at 05:58

## 2022-03-31 RX ADMIN — CARVEDILOL 12.5 MG: 12.5 TABLET, FILM COATED ORAL at 07:58

## 2022-03-31 RX ADMIN — GUAIFENESIN 100 MG: 200 SOLUTION ORAL at 20:25

## 2022-03-31 RX ADMIN — FENOFIBRATE 160 MG: 160 TABLET ORAL at 07:58

## 2022-03-31 RX ADMIN — FUROSEMIDE 20 MG: 20 TABLET ORAL at 17:13

## 2022-03-31 RX ADMIN — IPRATROPIUM BROMIDE AND ALBUTEROL SULFATE 1 AMPULE: 2.5; .5 SOLUTION RESPIRATORY (INHALATION) at 15:26

## 2022-03-31 RX ADMIN — IPRATROPIUM BROMIDE AND ALBUTEROL SULFATE 1 AMPULE: 2.5; .5 SOLUTION RESPIRATORY (INHALATION) at 11:13

## 2022-03-31 RX ADMIN — PREDNISONE 40 MG: 20 TABLET ORAL at 07:58

## 2022-03-31 RX ADMIN — IPRATROPIUM BROMIDE AND ALBUTEROL SULFATE 1 AMPULE: 2.5; .5 SOLUTION RESPIRATORY (INHALATION) at 06:46

## 2022-03-31 RX ADMIN — FERROUS SULFATE TAB 325 MG (65 MG ELEMENTAL FE) 325 MG: 325 (65 FE) TAB at 20:25

## 2022-03-31 RX ADMIN — AZITHROMYCIN MONOHYDRATE 250 MG: 250 TABLET ORAL at 08:04

## 2022-03-31 RX ADMIN — IPRATROPIUM BROMIDE AND ALBUTEROL SULFATE 1 AMPULE: 2.5; .5 SOLUTION RESPIRATORY (INHALATION) at 19:20

## 2022-03-31 RX ADMIN — CARVEDILOL 12.5 MG: 12.5 TABLET, FILM COATED ORAL at 20:26

## 2022-03-31 RX ADMIN — ATORVASTATIN CALCIUM 40 MG: 40 TABLET, FILM COATED ORAL at 20:25

## 2022-03-31 RX ADMIN — CITALOPRAM HYDROBROMIDE 20 MG: 20 TABLET ORAL at 07:58

## 2022-03-31 RX ADMIN — CALCIUM CARBONATE 600 MG (1,500 MG)-VITAMIN D3 400 UNIT TABLET 1 TABLET: at 07:59

## 2022-03-31 RX ADMIN — LOSARTAN POTASSIUM 100 MG: 100 TABLET, FILM COATED ORAL at 07:58

## 2022-03-31 RX ADMIN — CYANOCOBALAMIN TAB 1000 MCG 1000 MCG: 1000 TAB at 07:58

## 2022-03-31 RX ADMIN — PRAMIPEXOLE DIHYDROCHLORIDE 0.5 MG: 0.25 TABLET ORAL at 20:25

## 2022-03-31 RX ADMIN — GABAPENTIN 200 MG: 100 CAPSULE ORAL at 07:58

## 2022-03-31 RX ADMIN — TRAMADOL HYDROCHLORIDE 50 MG: 50 TABLET, COATED ORAL at 14:54

## 2022-03-31 RX ADMIN — TRAMADOL HYDROCHLORIDE 50 MG: 50 TABLET, COATED ORAL at 05:57

## 2022-03-31 RX ADMIN — BUSPIRONE HYDROCHLORIDE 5 MG: 5 TABLET ORAL at 08:05

## 2022-03-31 RX ADMIN — APIXABAN 5 MG: 5 TABLET, FILM COATED ORAL at 07:59

## 2022-03-31 RX ADMIN — Medication 6 MG: at 20:26

## 2022-03-31 RX ADMIN — APIXABAN 5 MG: 5 TABLET, FILM COATED ORAL at 20:26

## 2022-03-31 RX ADMIN — GABAPENTIN 200 MG: 100 CAPSULE ORAL at 20:25

## 2022-03-31 RX ADMIN — BUSPIRONE HYDROCHLORIDE 5 MG: 5 TABLET ORAL at 14:50

## 2022-03-31 RX ADMIN — GUAIFENESIN 100 MG: 200 SOLUTION ORAL at 17:17

## 2022-03-31 RX ADMIN — TRAMADOL HYDROCHLORIDE 50 MG: 50 TABLET, COATED ORAL at 20:26

## 2022-03-31 ASSESSMENT — PAIN SCALES - WONG BAKER
WONGBAKER_NUMERICALRESPONSE: 0

## 2022-03-31 ASSESSMENT — PAIN DESCRIPTION - LOCATION
LOCATION: CHEST
LOCATION: KNEE

## 2022-03-31 ASSESSMENT — PAIN SCALES - GENERAL
PAINLEVEL_OUTOF10: 5
PAINLEVEL_OUTOF10: 5
PAINLEVEL_OUTOF10: 8
PAINLEVEL_OUTOF10: 0
PAINLEVEL_OUTOF10: 0
PAINLEVEL_OUTOF10: 8
PAINLEVEL_OUTOF10: 0
PAINLEVEL_OUTOF10: 5

## 2022-03-31 ASSESSMENT — PAIN DESCRIPTION - PAIN TYPE
TYPE: ACUTE PAIN
TYPE: CHRONIC PAIN

## 2022-03-31 ASSESSMENT — PAIN DESCRIPTION - ORIENTATION
ORIENTATION: MID
ORIENTATION: RIGHT

## 2022-03-31 NOTE — PLAN OF CARE
Problem: Skin Integrity:  Goal: Will show no infection signs and symptoms  Description: Will show no infection signs and symptoms  3/31/2022 1510 by Zakia Durán RN  Outcome: Ongoing  Note: No signs of increased skin or tissue breakdown is noted. See Head to Toe/LDA assessments in flowsheets. Problem: Falls - Risk of:  Goal: Will remain free from falls  Description: Will remain free from falls  3/31/2022 1510 by Zakia Durán RN  Outcome: Ongoing  Note: Patient remains free of falls and injuries throughout shift. Bed remains in the lowest position, wheels locked, call light and bedside table are within reach. Problem: Pain:  Goal: Pain level will decrease  Description: Pain level will decrease  3/31/2022 1510 by Zakia Durán RN  Outcome: Ongoing  Note: Patient's pain is well controlled with current regimen. See MAR.       Problem: Mobility - Impaired:  Goal: Mobility will improve  Description: Mobility will improve  3/31/2022 1510 by Zakia Durán RN  Outcome: Ongoing     Problem: Musculor/Skeletal Functional Status  Goal: Highest potential functional level  3/31/2022 1510 by Zakia Durán RN  Outcome: Ongoing     Problem: Nutrition  Goal: Optimal nutrition therapy  3/31/2022 1510 by Zakia Durán RN  Outcome: Ongoing

## 2022-03-31 NOTE — PROGRESS NOTES
[address] [facility]    ACUTE REHABILITATION OCCUPATIONAL THERAPY  DAILY NOTE    Date: 3/31/22  Patient Name: Cherry Quezada      Room: 3869/5519-49    MRN: [id number]   : 1950  (71 y.o.)  Gender: female   Referring Practitioner: Luiz Palacios MD  Diagnosis: Cervical myelopathy  Additional Pertinent Hx: 71 y.o.  female, with a history of anemia, spondylolisthesis, chronic DVT, chronic diastolic heart failure, CVA, major depressive disorder, s/p cervical spine fusion, stenosis of cervical spine with myelopathy, and DM type II, who presents with leg pain, shortness of breath, fall, and neck pain. According to patient and her , patient has had multiple falls recently including a fall Monday and evening and again on Tuesday. Patient reports that today she felt extremely weak upon waking. Restrictions  Restrictions/Precautions: General Precautions,Fall Risk  Implants present? : Metal implants (Loop recorder)  Other position/activity restrictions: up as tolerated; O2 3L/m via NC  Required Braces or Orthoses?: No  Equipment Used: Other (RW)    Subjective  Subjective: \"I was coughing last night so that's why I'm sitting up\". Comments: Pt pleasent and agreeable for OT treatment. Patient Currently in Pain: Denies  Pain Level: 8  Pain Location: Chest (only when coughing)  Pain Orientation: Mid  Restrictions/Precautions: General Precautions; Fall Risk  Overall Orientation Status: Within Functional Limits     Pain Assessment  Pain Assessment: 0-10  Pain Level: 8  Pain Type: Acute pain  Pain Location: Chest (only when coughing)  Pain Orientation: Mid    Objective  Cognition  Overall Cognitive Status: WFL  Perception  Overall Perceptual Status: WFL  Balance  Sitting Balance: Supervision  Standing Balance: Contact guard assistance  Transfers  Stand Pivot Transfers: Contact guard assistance (VC for sequencing)  Sit to stand: Contact guard assistance  Stand to sit: Contact guard assistance  Transfer Comments: 1-2 UE support; VC's for technique; Dycem placed under L foot and shoes on pt to prevent L foot slipping ; increased time to complete     Functional Mobility  Functional - Mobility Device: Wheelchair  Activity: To/from bathroom  Assist Level: Minimal assistance  Fine Motor: Pt instruction in [facility] and instrinsic hand strengthening for ease and safety with ADLs. Pt completes graded resistive clothes pins and then engages in game of checkers at tabletop. ADL  Grooming: Setup (of items in reach at sink level)  UE Bathing: Setup (of items in reach at sink level)  LE Bathing: None (Pt reports no need at this time)  UE Dressing: Stand by assistance;Verbal cueing  LE Dressing: Minimal assistance (A for TEDs and pull pants/underwear up/over buttocks)  Toileting: None  Additional Comments: QUINONES facilitated pt in ADLs at the sink this AM. During LE dressing pt req intermiitent VC for proper use of AE (reacher) to increase ease and independence in dressing task. Assessment  Performance deficits / Impairments: Decreased functional mobility ; Decreased ADL status; Decreased strength;Decreased endurance;Decreased sensation;Decreased balance;Decreased high-level IADLs;Decreased fine motor control;Decreased coordination  Prognosis: Good  Discharge Recommendations: Patient would benefit from continued therapy after discharge  Activity Tolerance: Patient Tolerated treatment well  Safety Devices in place: Yes  Type of devices: Left in chair (Pt left supervised in gym at end of sessions)  Restraints  Initially in place: No          Patient Education: OT POC, safety with functional transfers, modified techniques for ADLs, AE for LBD  Patient Goals   Patient goals :  \"To be able to put my pants/socks on better\"  Learner:patient  Method: explanation       Outcome: acknowledged understanding of         Plan  Plan  Times per week: 5-7  Times per day: Twice a day  Current Treatment Recommendations: Balance Training,Functional Mobility Training,Endurance Training,Strengthening,ROM,Safety Education & Training,Patient/Caregiver Education & Training,Equipment Evaluation, Education, & procurement,Self-Care / ADL,Pain Management  Patient Goals   Patient goals :  \"To be able to put my pants/socks on better\"  Short term goals  Time Frame for Short term goals: 1 week   Short term goal 1: Pt will complete LB dressing/bathing/toileting CGA with Good safety with use of AE/DME/modified techniques for increased IND with self care  Short term goal 2: Pt will participate in 30+ minutes functional activity for increased strength/balance/endurance for increased IND in ADL's  Short term goal 3: Pt will complete transfers/functional mobility CGA with Good safety and RW for increased IND in ADL's  Short term goal 4: Pt will verbalize/demonstrate Good understanding of AE/DME/modified techniques for increased IND in self care  Short term goal 5: Pt will complete UB bathing/dressing/grooming with Supervision for increased IND in self care  Long term goals  Time Frame for Long term goals : by discharge   Long term goal 1: Pt will complete toileting/grooming/dressing Mod I and bathing with Supervison with Good safety with use of AE/DME/modified techniques for increased IND with self care  Long term goal 2: Pt will complete functional mobility/transferes during functional activity Mod I with Good safety and RW for increased IND in ADL's  Long term goal 3: Pt will verbalize/demonstrate Good understanding of fall prevention strategies for increased safety/IND in self care  Long term goal 4: Pt will improve L hand strength for self care as evidence by a 3# improvement in dynomometor testing   Long term goal 5: Pt will improve L FMC as evidence by a 20 second improvement on 9 hole peg test for increased IND with self care        03/31/22 1425 03/31/22 1430   OT Individual Minutes   Time In 0900 1300   Time Out 1004 1100 [address]     Electronically signed by PATRICIA Clifford on 3/31/22 at 3:49 PM EDT

## 2022-03-31 NOTE — PROGRESS NOTES
Physical Therapy  Facility/Department: [facility]  Daily Treatment Note  NAME: Des Romano  : 1950  MRN: [id number]    Date of Service: 3/31/2022    Discharge Recommendations:  Patient would benefit from continued therapy after discharge        Assessment      PT Education: General Safety;Transfer Training; Adaptive Device Training; Injury Prevention; Functional Mobility Training;Energy Conservation;Precautions  Patient Education: STS transfers: extensive education / coaching for balance and safety  Activity Tolerance  Activity Tolerance: Patient limited by pain; Patient limited by fatigue;Patient limited by endurance  Activity Tolerance: severe cough and ribcage pain     Patient Diagnosis(es): There were no encounter diagnoses. has a past medical history of Allergic rhinitis, cause unspecified, Back pain, Bowel obstruction (HCC), C. difficile diarrhea, CAD (coronary artery disease), Cardiac murmur, Cellulitis, Cellulitis, Cerebral artery occlusion with cerebral infarction ([address] COVID-19, Diverticulosis of colon (without mention of hemorrhage), GERD (gastroesophageal reflux disease), GERD (gastroesophageal reflux disease), History of blood transfusion, History of CHF (congestive heart failure), History of MI (myocardial infarction), History of ovarian cyst, History of peritonitis, HTN (hypertension), Hx of blood clots, Hyperlipidemia, Intestinal or peritoneal adhesions with obstruction (postoperative) (postinfection) ([address] Kidney infection, Lateral epicondylitis  of elbow, MDRO (multiple drug resistant organisms) resistance, Muscle strain, Other abnormal glucose, PONV (postoperative nausea and vomiting), Pre-diabetes, Restless legs syndrome (RLS), Snores, 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[address] TIA (transient ischemic attack), Uses walker, Vitamin D deficiency, Wears glasses, and Wellness examination.    has a past surgical history that includes Ovary removal (); colectomy (15); Appendectomy (1968); Ovary removal (1971); Hysterectomy (1973); Bunionectomy (Left); sinus surgery (2004); Colonoscopy; other surgical history (08/14/2014); cyst removal (Right); Wrist fracture surgery (Left, 03/05/2019); Upper gastrointestinal endoscopy (N/A, 05/31/2019); Upper gastrointestinal endoscopy (N/A, 08/05/2019); Upper gastrointestinal endoscopy (N/A, 08/23/2019); Abdomen surgery (1976); lumbar fusion (N/A, 02/10/2020); Cardiac catheterization; Cardiac catheterization (2005); [place] tooth extraction; lumbar fusion (N/A, 06/17/2020); back surgery; cervical fusion (05/21/2021); and cervical fusion (N/A, 5/21/2021). Restrictions  Restrictions/Precautions  Restrictions/Precautions: General Precautions,Fall Risk  Required Braces or Orthoses?: No  Implants present? : Metal implants ( Loop recorder)  Position Activity Restriction  Other position/activity restrictions: up as tolerated; O2 3L/m via NC     Subjective   General  Chart Reviewed: Yes  Additional Pertinent Hx: TIA  Response To Previous Treatment: Patient with no complaints from previous session. Family / Caregiver Present: No  Referring Practitioner: Dr Jasmina Felipe. Subjective  Subjective: Pt reported that she did not sleep well last night, she kept waking up. Pt reported 0/10 pain at rest. She still has pain in her chest/ribs when she coughs, but not as bad as yesterday. Pain Screening  Patient Currently in Pain: Other (comment) (Pt reported 0/10 pain at reat, but she has pain in her chest/ribs when she coughs)  Vital Signs  Patient Currently in Pain: Other (comment) (Pt reported 0/10 pain at reat, but she has pain in her chest/ribs when she coughs)  Oxygen Therapy  O2 Device: Nasal cannula       Orientation  Orientation  Overall Orientation Status: Within Normal Limits    Objective      Transfers  Sit to Stand: Contact guard assistance; Moderate Assistance (CGA-ModA from chair)  Stand to sit: Contact guard assistance; Moderate Assistance (CGA-ModA from chair)  Comment: uses RW  Ambulation  Ambulation?: Yes  Ambulation 1  Surface: level tile  Device: Rolling Walker  Other Apparatus: Wheelchair follow  Assistance: Contact guard assistance  Quality of Gait: slow tawanda with step to pattern, flat foot LE at contact, decreased stance on L LE; downward gaze and cervical flexion (slowly improving and becoming more aware, requiring less cues)  Gait Deviations: Decreased step length;Decreased step height;Shuffles; Slow Tawanda  Distance: 70 feet; 60 feet; + short distances between activities  Comments: Pt reported dizziness during AMB (PTA checked pt's SpO2 after she sat to rest and it was 90-91% on RA, PTA reported this to the RN and PM&R doctor. Pt was put on med hold, to for rest and allow further assessment)  Stairs/Curb  Stairs?: Yes  Stairs  # Steps : 10  Stairs Height:  (4\"/6\")  Rails: Bilateral  Device: No Device  Assistance: Contact guard assistance  Comment: step-to pattern  Wheelchair Activities  Propulsion: Yes  Propulsion 1  Propulsion: Manual  Level: Level Tile  Method: RUE;LUE  Level of Assistance: Modified independent  Distance: short distances in the gym     Balance  Posture: Fair  Sitting - Static: Good  Sitting - Dynamic: Fair  Standing - Static: Fair  Standing - Dynamic: Fair;-  Comments: Standing with RW.   Other exercises  Other exercises?: Yes  Other exercises 2: Seated Ex: BLE x20 reps with #2 and green tband, hip adduction squeezes with pillow  Other exercises 4: NuStep L1 x10 minutes (using BUE as tolerated)  Other exercises 5: Standing Ex: BLE x10 reps, in // bars  Other exercises 6: STS transfers: x10 (extensive education / coaching for balance and safety, pt was trying to push herself forward, but kept pushing the w/c back), pt has good success with padding between her back and the backrest to bring her hips foward and prevent hip extension before finding her balance      Goals  Short term goals  Time Frame for Short term goals: 1 week  Short term goal 1: Pt to demostrate bed mobility CGA/Jennifer. Short term goal 2: Pt to perform transfers CGA. Short term goal 3: Pt to amb 100'-150' with device, CGA. Short term goal 4: Pt to improve BLE strength by 1/2 MMG. Short term goal 5: Pt to improve standing balance to [facility]. Short term goal 6: Pt able to perform 4\" and 6\" steps x3 in //bars or acute stair way, with 2 B UE support, min A  Long term goals  Time Frame for Long term goals : By DC  Long term goal 1: Pt able to perform bed mobility at SBA  Long term goal 2: Pt able to transfer at supervsion level. Long term goal 3:  Pt able to ambulate house hold distances with assistive device mod-I  Long term goal 4:  Pt able to ambulate distance of 150 ft, level surfaces and shorter distance outside terraine at SBA. Long term goal 5: Pt able to propel w/c on level surface, distance of 150 ft, supervsion level. Long term goal 6: Improve 2MWT distance to atleast 120 ft to improve overall fucntion. Long term goal 7: Pt to improve Tinetti balance score to atlest 20/28 to reduce fall risk. Long term goal 8: Pt donny to perform cub step with B UE support and/or donny to goup and down 4 to 5 steps with 2 rails at min A   Patient Goals   Patient goals : To get stronger    Plan    Plan  Times per week: 1.5 hr/day, 5 to 7 days/week. Specific instructions for Next Treatment: transfers, amb, balance, standing program  Current Treatment Recommendations: Strengthening,Balance Training,Functional Mobility Training,Transfer Training,Endurance Training,Gait Training,Equipment Evaluation, Education, & procurement,Patient/Caregiver Education & Training,Safety Education & Training,Stair training,Wheelchair Mobility Training  Safety Devices  Type of devices:  All fall risk precautions in place,Call light within reach,Gait belt,Patient at risk for falls,Nurse notified,Left in chair,Chair alarm in place     Therapy Time     03/31/22 1005 03/31/22 1330   PT Individual Minutes   Time In [facility] [address], [place]

## 2022-03-31 NOTE — PROGRESS NOTES
Physical Medicine & Rehabilitation  Progress Note    3/31/2022 11:48 AM     CC: Ambulatory and ADL dysfunction due to neuropathy with ataxia    Subjective:   O2 3.5 L, some cough. ROS:  Denies fevers, chills, sweats. No chest pain, palpitations, lightheadedness. Denies coughing, wheezing or shortness of breath. Denies abdominal pain, nausea, diarrhea or constipation. No new areas of joint pain. Denies new areas of numbness or weakness. Denies new anxiety or depression issues. No new skin problems. Rehabilitation:   PT:  Restrictions/Precautions: General Precautions,Fall Risk  Implants present? : Metal implants (Loop recorder)  Other position/activity restrictions: up as tolerated; O2 3L/m via NC   Transfers  Sit to Stand: Contact guard assistance,Minimal Assistance (CGA-Marilyn from chair)  Stand to sit: Contact guard assistance,Minimal Assistance (CGA-Marilyn from chair)  Bed to Chair: Maximum assistance ( MaxA for bed to chair once today, pt started laughing, had pain, and lost her strength)  Stand Pivot Transfers: Contact guard assistance,Minimal Assistance (CGA-Marilyn STS from chair)  Comment: RW used for transfers  Ambulation 1  Surface: level tile  Device: Rolling Walker  Other Apparatus: Wheelchair follow  Assistance: Contact guard assistance  Quality of Gait: slow tawanda with step to pattern, flat foot LE at contact, decreased stance on L LE; downward gaze and cervical flexion (slowly improving and becoming more aware, requiring less cues)  Gait Deviations: Decreased step length,Decreased step height,Shuffles,Slow Tawanda  Distance: 76 feet; + short distances between activities  Comments: Pt reported dizziness during AMB (PTA checked pt's SpO2 after she sat to rest and it was 90-91% on RA, PTA reported this to the RN and PM&R doctor.  Pt was put on med hold, to for rest and allow further assessment)          OT:  ADL  Equipment Provided: Reacher,Long-handled sponge  Feeding: Setup,Increased time to complete (pt reports diffulty keeping food on utensils )  Grooming: Setup (of items in reach at sink level)  UE Bathing: Setup (of items in reach at sink level)  LE Bathing: Contact guard assistance,Setup,Verbal cueing,Increased time to complete (CGA standing for brice care, seated for BLEs with LHS)  UE Dressing: Stand by assistance,Verbal cueing  LE Dressing:  (A for TEDs and pull pants/underwear up/over buttocks)  Toileting: Contact guard assistance,Stand by assistance (seated weight shift L<>R for brice care, CGA pant mgt)  Additional Comments: QUINONES facilitated pt in ADLs at the sink this AM. During LE dressing pt req intermiitent VC for proper use of AE (reacher) to increase ease and independence in dressing task. Balance  Sitting Balance: Supervision  Standing Balance: Contact guard assistance   Standing Balance  Time: AM: <1 min x4, 1-2 mins x2; < 1min x2  Activity: transfers/self care  Comment: shower GB and toilet GB  for 1-2 UE supportto decrease risk of falls, no LOB noted   Functional Mobility  Functional - Mobility Device: Wheelchair  Activity: To/from bathroom  Assist Level: Minimal assistance  Functional Mobility Comments: Able to self propel with BUEs however req assist for w/c mgt when entering/exiting shower in prep for SPT     Bed mobility  Rolling to Left: Minimal assistance  Rolling to Right: Minimal assistance  Supine to Sit: Minimal assistance (A trunk in upright position )  Sit to Supine: Moderate assistance (A BLEs )  Scooting: Minimal assistance  Comment: MOD A to return to bed for BLE management due to increased pain and fatigue  Transfers  Stand Pivot Transfers: Contact guard assistance (VC for sequencing)  Sit to stand: Contact guard assistance  Stand to sit: Contact guard assistance  Transfer Comments: 1-2 UE support; VC's for technique;  Dycem placed under L foot and shoes on pt to prevent L foot slipping ; increased time to complete   Toilet Transfers  Toilet - Technique: Stand pivot  Equipment Used: Grab bars  Toilet Transfer: Minimal assistance  Toilet Transfers Comments: VC's for hand placement/technique      Shower Transfers  Shower - Transfer From: Wheelchair  Shower - Transfer Type: To and From  Shower - Transfer To: Shower seat with back  Shower - Technique: Stand pivot  Shower Transfers: Contact Guard  Shower Transfers Comments: 1-2 UE support; VC's for technique; Dysem placed under L foot and shoes on pt to prevent L foot slipping ; increased time to complete, No LOB noted   Wheelchair Bed Transfers  Wheelchair/Bed - Technique: Stand pivot  Equipment Used: Other (RW)  Level of Asssistance: Moderate assistance  Wheelchair Transfers Comments: with RW; Blocking of L foot due to slipping      ST:            Objective:  BP (!) 122/56   Pulse 60   Temp 98.4 °F (36.9 °C) (Oral)   Resp 20   Ht 5' 3\" (1.6 m)   Wt 180 lb 6.4 oz (81.8 kg)   SpO2 93%   BMI 31.96 kg/m²  I Body mass index is 31.96 kg/m². I   Wt Readings from Last 1 Encounters:   22 180 lb 6.4 oz (81.8 kg)      Temp (24hrs), Av.4 °F (36.9 °C), Min:98.3 °F (36.8 °C), Max:98.4 °F (36.9 °C)         GEN: well developed, well nourished, no acute distress  HEENT: Normocephalic atraumatic, EOMI, mucous membranes pink and moist  CV: RRR, no murmurs, rubs or gallops  PULM:  Respirations WNL and unlabored, palpation sternal area reproduces sternal discomfort-no change  ABD: soft, NT, ND, +BS and equal  NEURO: A&O x3. Sensation intact to light touch. MSK: At least antigravity to 4 -/5 strength  EXTREMITIES: No calf tenderness to palpation bilaterally. No edema BLEs  SKIN: warm dry and intact with good turgor  PSYCH: appropriately interactive. Affect WNL.           Medications   Scheduled Meds:   predniSONE  40 mg Oral Daily    azithromycin  250 mg Oral Daily    ipratropium-albuterol  1 ampule Inhalation 4x daily    losartan  100 mg Oral Daily    amLODIPine  5 mg Oral Daily    gabapentin  200 mg Oral BID    lidocaine  1 patch TransDERmal Daily    busPIRone  5 mg Oral TID    apixaban  5 mg Oral BID    atorvastatin  40 mg Oral Nightly    calcium carbonate w/vitamin D  1 tablet Oral Daily    carvedilol  12.5 mg Oral BID    citalopram  20 mg Oral Daily    fenofibrate  160 mg Oral Daily    ferrous sulfate  325 mg Oral BID    furosemide  20 mg Oral BID    pramipexole  0.5 mg Oral Nightly    cyanocobalamin  1,000 mcg Oral Daily    polyethylene glycol  17 g Oral Daily     Continuous Infusions:  PRN Meds:.albuterol, guaiFENesin, traMADol, melatonin, magnesium hydroxide, docusate sodium, acetaminophen, senna, bisacodyl     Diagnostics:     CBC:   Recent Labs     03/31/22  0645   WBC 5.8   RBC 3.37*   HGB 10.7*   HCT 31.5*   MCV 93.6   RDW 13.7        BMP:   Recent Labs     03/31/22  0645      K 4.6      CO2 26   BUN 64*   CREATININE 1.36*     BNP: No results for input(s): BNP in the last 72 hours. PT/INR: No results for input(s): PROTIME, INR in the last 72 hours. APTT: No results for input(s): APTT in the last 72 hours. CARDIAC ENZYMES: No results for input(s): CKMB, CKMBINDEX, TROPONINT in the last 72 hours. Invalid input(s): CKTOTAL;3  FASTING LIPID PANEL:  Lab Results   Component Value Date    CHOL 103 05/20/2019    HDL 74 03/12/2021    TRIG 66 05/20/2019     LIVER PROFILE: No results for input(s): AST, ALT, ALB, BILIDIR, BILITOT, ALKPHOS in the last 72 hours. I/O (24Hr): Intake/Output Summary (Last 24 hours) at 3/31/2022 1148  Last data filed at 3/31/2022 0618  Gross per 24 hour   Intake 840 ml   Output 650 ml   Net 190 ml       Glu last 24 hour  No results for input(s): POCGLU in the last 72 hours. No results for input(s): CLARITYU, COLORU, PHUR, SPECGRAV, PROTEINU, RBCUA, BLOODU, BACTERIA, NITRU, WBCUA, LEUKOCYTESUR, YEAST, GLUCOSEU, BILIRUBINUR in the last 72 hours.     Chest x-ray 3/23/2022  Old left rib fractures noted.  Examination is otherwise unremarkable.     3/29/22 chest x-ray  Impression:        No acute cardiopulmonary disease           Impression/Plan:    1. Ataxia with L sided weakness:  PT/OT for gait, mobility, strengthening, endurance, ADLs, and self care. On Pramipexole discharge plan 4/6  2. Cervical myelopathy: Hx C3-6 decompression. Has gabapentin and prn Tylenol for pain. 3.  sternal discomfort reproducible -suspect costochondritis, patient unable to have anti-inflammatories-continue Ultram, cardiac ujiy-xy-eoiucjpw medicine evaluated, suspect bronchitis -  4. Pulmonary-discussed internal medicine today, Dr. Stearns Economy bronchitis, on steroids and Zithromax, will evaluate today, reviewed  increased O2 needs on Proventil nebulizer, DuoNeb and Robitussin  5. Chronic diastolic heart failure/HTN/CAD: on amlodipine, lipitor, carvedilol, fenofibrate, , furosemide-Norvasc decreased, discontinued and started on Cozaar by internal medicine, monitor, chest x-ray negative 3/29  6. Major depressive disorder: on celexa. -addition of BuSpar by internal medicine, consider psych evaluation if patient agreeable-patient does not want psych evaluation at this time, denies suicidal /homicidal ideation continue to monitor, patient notes will notify if would like eval  7. Chronic DVT: on eliquis - monitor with history of falls  8. Anemia: Hb near normal. On ferrous sulfate. Monitor ,trending down 12.0-11 0.4-10.2-10.7 check stool pending  9. Lower extremity cellulitis: completed Keflex   10. BIN:. Monitoring BMP,  on Lasix lisinopril-creatinine trending up 64/1.36, ? decrease Lasix/push fluids , concern of CHF, internal medicine follow-up  11. Restless left leg -Mirapex  12. Insomnia: has melatonin prn   13. Pain-Lidoderm patch to right shoulder Neurontin  14. Bowel Management: Miralax daily, senokot prn, dulcolax prn. 15. DVT Prophylaxis:  SCD's while in bed, PRAVEEN's  and Eliquis  16. Internal medicine for medical management        Bettina Krabbe. Akanksha Henriquez MD       This note is created with the assistance of a speech recognition program.  While intending to generate a document that actually reflects the content of the visit, the document can still have some errors including those of syntax and sound a like substitutions which may escape proof reading.   In such instances, actual meaning can be extrapolated by contextual diversion

## 2022-03-31 NOTE — PLAN OF CARE
Problem: Skin Integrity:  Goal: Will show no infection signs and symptoms  Description: Will show no infection signs and symptoms  Outcome: Ongoing  Goal: Absence of new skin breakdown  Description: Absence of new skin breakdown  Outcome: Ongoing     Problem: Falls - Risk of:  Goal: Will remain free from falls  Description: Will remain free from falls  Outcome: Ongoing  Goal: Absence of physical injury  Description: Absence of physical injury  Outcome: Ongoing     Problem: Pain:  Goal: Pain level will decrease  Description: Pain level will decrease  Outcome: Ongoing  Goal: Control of acute pain  Description: Control of acute pain  Outcome: Ongoing  Goal: Control of chronic pain  Description: Control of chronic pain  Outcome: Ongoing     Problem: Mobility - Impaired:  Goal: Mobility will improve  Description: Mobility will improve  Outcome: Ongoing     Problem: Musculor/Skeletal Functional Status  Goal: Highest potential functional level  Outcome: Ongoing  Goal: Absence of falls  Outcome: Ongoing    Problem: Nutrition  Goal: Optimal nutrition therapy  Outcome: Ongoing

## 2022-04-01 PROCEDURE — 97530 THERAPEUTIC ACTIVITIES: CPT

## 2022-04-01 PROCEDURE — 97535 SELF CARE MNGMENT TRAINING: CPT

## 2022-04-01 PROCEDURE — 6370000000 HC RX 637 (ALT 250 FOR IP): Performed by: PHYSICAL MEDICINE & REHABILITATION

## 2022-04-01 PROCEDURE — 6370000000 HC RX 637 (ALT 250 FOR IP): Performed by: INTERNAL MEDICINE

## 2022-04-01 PROCEDURE — 94761 N-INVAS EAR/PLS OXIMETRY MLT: CPT

## 2022-04-01 PROCEDURE — 94640 AIRWAY INHALATION TREATMENT: CPT

## 2022-04-01 PROCEDURE — 99232 SBSQ HOSP IP/OBS MODERATE 35: CPT | Performed by: INTERNAL MEDICINE

## 2022-04-01 PROCEDURE — 97110 THERAPEUTIC EXERCISES: CPT

## 2022-04-01 PROCEDURE — 97116 GAIT TRAINING THERAPY: CPT

## 2022-04-01 PROCEDURE — 99232 SBSQ HOSP IP/OBS MODERATE 35: CPT | Performed by: PHYSICAL MEDICINE & REHABILITATION

## 2022-04-01 PROCEDURE — 2500000003 HC RX 250 WO HCPCS: Performed by: PHYSICAL MEDICINE & REHABILITATION

## 2022-04-01 PROCEDURE — 2700000000 HC OXYGEN THERAPY PER DAY

## 2022-04-01 PROCEDURE — 1180000000 HC REHAB R&B

## 2022-04-01 PROCEDURE — 6370000000 HC RX 637 (ALT 250 FOR IP): Performed by: NURSE PRACTITIONER

## 2022-04-01 RX ADMIN — FENOFIBRATE 160 MG: 160 TABLET ORAL at 07:18

## 2022-04-01 RX ADMIN — CARVEDILOL 12.5 MG: 12.5 TABLET, FILM COATED ORAL at 20:27

## 2022-04-01 RX ADMIN — Medication 6 MG: at 20:27

## 2022-04-01 RX ADMIN — GABAPENTIN 200 MG: 100 CAPSULE ORAL at 07:17

## 2022-04-01 RX ADMIN — CYANOCOBALAMIN TAB 1000 MCG 1000 MCG: 1000 TAB at 07:20

## 2022-04-01 RX ADMIN — GUAIFENESIN 100 MG: 200 SOLUTION ORAL at 04:03

## 2022-04-01 RX ADMIN — TRAMADOL HYDROCHLORIDE 50 MG: 50 TABLET, COATED ORAL at 20:27

## 2022-04-01 RX ADMIN — GABAPENTIN 200 MG: 100 CAPSULE ORAL at 20:27

## 2022-04-01 RX ADMIN — APIXABAN 5 MG: 5 TABLET, FILM COATED ORAL at 07:18

## 2022-04-01 RX ADMIN — IPRATROPIUM BROMIDE AND ALBUTEROL SULFATE 1 AMPULE: 2.5; .5 SOLUTION RESPIRATORY (INHALATION) at 12:40

## 2022-04-01 RX ADMIN — FUROSEMIDE 20 MG: 20 TABLET ORAL at 07:18

## 2022-04-01 RX ADMIN — FERROUS SULFATE TAB 325 MG (65 MG ELEMENTAL FE) 325 MG: 325 (65 FE) TAB at 07:18

## 2022-04-01 RX ADMIN — PREDNISONE 40 MG: 20 TABLET ORAL at 07:18

## 2022-04-01 RX ADMIN — APIXABAN 5 MG: 5 TABLET, FILM COATED ORAL at 20:27

## 2022-04-01 RX ADMIN — AMLODIPINE BESYLATE 5 MG: 5 TABLET ORAL at 07:18

## 2022-04-01 RX ADMIN — BUSPIRONE HYDROCHLORIDE 5 MG: 5 TABLET ORAL at 20:28

## 2022-04-01 RX ADMIN — CITALOPRAM HYDROBROMIDE 20 MG: 20 TABLET ORAL at 07:18

## 2022-04-01 RX ADMIN — LOSARTAN POTASSIUM 100 MG: 100 TABLET, FILM COATED ORAL at 07:18

## 2022-04-01 RX ADMIN — ATORVASTATIN CALCIUM 40 MG: 40 TABLET, FILM COATED ORAL at 20:27

## 2022-04-01 RX ADMIN — FERROUS SULFATE TAB 325 MG (65 MG ELEMENTAL FE) 325 MG: 325 (65 FE) TAB at 20:27

## 2022-04-01 RX ADMIN — FUROSEMIDE 20 MG: 20 TABLET ORAL at 17:03

## 2022-04-01 RX ADMIN — BUSPIRONE HYDROCHLORIDE 5 MG: 5 TABLET ORAL at 07:21

## 2022-04-01 RX ADMIN — CALCIUM CARBONATE 600 MG (1,500 MG)-VITAMIN D3 400 UNIT TABLET 1 TABLET: at 07:20

## 2022-04-01 RX ADMIN — CARVEDILOL 12.5 MG: 12.5 TABLET, FILM COATED ORAL at 07:18

## 2022-04-01 RX ADMIN — TRAMADOL HYDROCHLORIDE 50 MG: 50 TABLET, COATED ORAL at 04:03

## 2022-04-01 RX ADMIN — IPRATROPIUM BROMIDE AND ALBUTEROL SULFATE 1 AMPULE: 2.5; .5 SOLUTION RESPIRATORY (INHALATION) at 16:23

## 2022-04-01 RX ADMIN — PRAMIPEXOLE DIHYDROCHLORIDE 0.5 MG: 0.25 TABLET ORAL at 20:27

## 2022-04-01 RX ADMIN — BUSPIRONE HYDROCHLORIDE 5 MG: 5 TABLET ORAL at 14:57

## 2022-04-01 RX ADMIN — IPRATROPIUM BROMIDE AND ALBUTEROL SULFATE 1 AMPULE: 2.5; .5 SOLUTION RESPIRATORY (INHALATION) at 20:28

## 2022-04-01 RX ADMIN — AZITHROMYCIN MONOHYDRATE 250 MG: 250 TABLET ORAL at 07:21

## 2022-04-01 ASSESSMENT — PAIN DESCRIPTION - LOCATION
LOCATION: CHEST
LOCATION: NECK
LOCATION: CHEST

## 2022-04-01 ASSESSMENT — PAIN SCALES - WONG BAKER

## 2022-04-01 ASSESSMENT — PAIN DESCRIPTION - ORIENTATION: ORIENTATION: POSTERIOR

## 2022-04-01 ASSESSMENT — PAIN SCALES - GENERAL
PAINLEVEL_OUTOF10: 5
PAINLEVEL_OUTOF10: 2
PAINLEVEL_OUTOF10: 8

## 2022-04-01 ASSESSMENT — PAIN DESCRIPTION - PAIN TYPE: TYPE: CHRONIC PAIN

## 2022-04-01 NOTE — PROGRESS NOTES
Physical Therapy  Facility/Department: [facility] ACUTE REHAB  Daily Treatment Note  NAME: Kendrick Sloan  : 1950  MRN: [id number]    Date of Service: 2022    Discharge Recommendations:  Patient would benefit from continued therapy after discharge        Assessment      Activity Tolerance  Activity Tolerance: Patient limited by pain; Patient limited by fatigue;Patient limited by endurance  Activity Tolerance: ribcage pain and neck pain     Patient Diagnosis(es): There were no encounter diagnoses. has a past medical history of Allergic rhinitis, cause unspecified, Back pain, Bowel obstruction (HCC), C. difficile diarrhea, CAD (coronary artery disease), Cardiac murmur, Cellulitis, Cellulitis, Cerebral artery occlusion with cerebral infarction ([address] COVID-19, Diverticulosis of colon (without mention of hemorrhage), GERD (gastroesophageal reflux disease), GERD (gastroesophageal reflux disease), History of blood transfusion, History of CHF (congestive heart failure), History of MI (myocardial infarction), History of ovarian cyst, History of peritonitis, HTN (hypertension), Hx of blood clots, Hyperlipidemia, Intestinal or peritoneal adhesions with obstruction (postoperative) (postinfection) ([address] Kidney infection, Lateral epicondylitis  of elbow, MDRO (multiple drug resistant organisms) resistance, Muscle strain, Other abnormal glucose, PONV (postoperative nausea and vomiting), Pre-diabetes, Restless legs syndrome (RLS), Snores, 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[address] TIA (transient ischemic attack), Uses walker, Vitamin D deficiency, Wears glasses, and Wellness examination. has a past surgical history that includes Ovary removal (); colectomy (); Appendectomy (); Ovary removal (); Hysterectomy (); Bunionectomy (Left); sinus surgery (); Colonoscopy; other surgical history (2014); cyst removal (Right); Wrist fracture surgery (Left, 2019);  Upper gastrointestinal endoscopy (N/A, 05/31/2019); Upper gastrointestinal endoscopy (N/A, 08/05/2019); Upper gastrointestinal endoscopy (N/A, 08/23/2019); Abdomen surgery (1976); lumbar fusion (N/A, 02/10/2020); Cardiac catheterization; Cardiac catheterization (2005); [place] tooth extraction; lumbar fusion (N/A, 06/17/2020); back surgery; cervical fusion (05/21/2021); and cervical fusion (N/A, 5/21/2021). Restrictions  Restrictions/Precautions  Restrictions/Precautions: General Precautions,Fall Risk  Required Braces or Orthoses?: No  Implants present? : Metal implants ( Loop recorder)  Position Activity Restriction  Other position/activity restrictions: up as tolerated; O2 3L/m via NC     Subjective   General  Chart Reviewed: Yes  Additional Pertinent Hx: TIA  Response To Previous Treatment: Patient with no complaints from previous session. Family / Caregiver Present: No  Referring Practitioner: Dr Kym Thakkar. Subjective  Subjective: Pt reported that she did not sleep well last night, she kept waking up. Pt reported 4/10 neck pain at rest, that increases to 8/10 when she turns her head to the Left or when she looks up. Pt reported that the cough is improving. Pain Screening  Patient Currently in Pain: Other (comment) (Pt reported 4/10 neck pain at rest, that increases to 8/10 when she turns her head to the Left or when she looks up. Pt reported that the cough is improving.)  Pain Assessment  Pain Type: Chronic pain  Pain Location: Neck  Pain Orientation: Posterior  Non-Pharmaceutical Pain Intervention(s): Repositioned;Rest;Distraction  Vital Signs  Patient Currently in Pain: Other (comment) (Pt reported 4/10 neck pain at rest, that increases to 8/10 when she turns her head to the Left or when she looks up.  Pt reported that the cough is improving.)  Oxygen Therapy  O2 Device: Nasal cannula       Orientation  Orientation  Overall Orientation Status: Within Normal Limits    Objective      Transfers  Sit to Stand: Contact guard assistance; Moderate Assistance (CGA-ModA from chair)  Stand to sit: Contact guard assistance; Moderate Assistance (CGA-ModA from chair)  Comment: uses RW  Ambulation  Ambulation?: Yes  Ambulation 1  Surface: level tile  Device: Rolling Walker  Other Apparatus: Wheelchair follow  Assistance: Contact guard assistance  Quality of Gait: slow tawanda with step to pattern, flat foot LE at contact, decreased stance on L LE; downward gaze and cervical flexion (slowly improving and becoming more aware, requiring less cues)  Gait Deviations: Decreased step length;Decreased step height;Shuffles; Slow Tawanda  Distance: 70 feet; 60 feet; + short distances between activities  Stairs/Curb  Stairs?: Yes  Stairs  # Steps : 10  Stairs Height:  (4\"/6\")  Rails: Bilateral  Device: No Device  Assistance: Contact guard assistance  Comment: step-to pattern  Wheelchair Activities  Propulsion: Yes  Propulsion 1  Propulsion: Manual  Level: Level Tile  Method: RUE;LUE  Level of Assistance: Modified independent  Distance: 56 feet     Balance  Posture: Fair  Sitting - Static: Good  Sitting - Dynamic: Fair  Standing - Static: Fair  Standing - Dynamic: Fair;-  Comments: Standing with RW. Other exercises  Other exercises?: Yes  Other exercises 2: Seated Ex: BLE x20 reps with #2 and green tband, hip adduction squeezes with pillow  Other exercises 4: NuStep L5 x10 minutes (using BUE as tolerated)  Other exercises 5: Standing Ex: BLE x10 reps, in // bars  Other exercises 6: STS transfers: x5 (continued education / coaching for balance and safety: pt sometimes tries to push herself forward, but pushes the w/c back) pt has good success with padding between her back and the backrest to bring her hips foward and prevent hip extension before finding her balance       Goals  Short term goals  Time Frame for Short term goals: 1 week  Short term goal 1: Pt to demostrate bed mobility CGA/Jennifer. Short term goal 2: Pt to perform transfers CGA.    Short term goal 3: Pt to amb 100'-150' with device, CGA. Short term goal 4: Pt to improve BLE strength by 1/2 MMG. Short term goal 5: Pt to improve standing balance to [facility]. Short term goal 6: Pt able to perform 4\" and 6\" steps x3 in //bars or acute stair way, with 2 B UE support, min A  Long term goals  Time Frame for Long term goals : By DC  Long term goal 1: Pt able to perform bed mobility at [facility]  Long term goal 2: Pt able to transfer at supervsion level. Long term goal 3:  Pt able to ambulate house hold distances with assistive device mod-I  Long term goal 4:  Pt able to ambulate distance of 150 ft, level surfaces and shorter distance outside terraine at [facility]. Long term goal 5: Pt able to propel w/c on level surface, distance of 150 ft, supervsion level. Long term goal 6: Improve 2MWT distance to atleast 120 ft to improve overall fucntion. Long term goal 7: Pt to improve Tinetti balance score to atlest 20/28 to reduce fall risk. Long term goal 8: Pt donny to perform cub step with B UE support and/or donny to goup and down 4 to 5 steps with 2 rails at min A   Patient Goals   Patient goals : To get stronger    Plan    Plan  Times per week: 1.5 hr/day, 5 to 7 days/week. Specific instructions for Next Treatment: transfers, amb, balance, standing program  Current Treatment Recommendations: Strengthening,Balance Training,Functional Mobility Training,Transfer Training,Endurance Training,Gait Training,Equipment Evaluation, Education, & procurement,Patient/Caregiver Education & Training,Safety Education & Training,Stair training,Wheelchair Mobility Training  Safety Devices  Type of devices:  All fall risk precautions in place,Call light within reach,Gait belt,Patient at risk for falls,Nurse notified,Left in chair,Chair alarm in place     Therapy Time     04/01/22 1005 04/01/22 1330   PT Individual Minutes   Time In 1005 1335   Time Out 1102 1408   Minutes 57 33   PT Concurrent Minutes   Time In 1102 1330   Time Out 1110 1335 Minutes 8 5   Time Code Minutes   Timed Code Treatment Minutes  --  [address] Orozco, PTA

## 2022-04-01 NOTE — PROGRESS NOTES
Physical Medicine & Rehabilitation  Progress Note    4/1/2022 1:22 PM     CC: Ambulatory and ADL dysfunction due to neuropathy with ataxia    Subjective:   Feels better today, attempting to taper O2. ROS:  Denies fevers, chills, sweats. No chest pain, palpitations, lightheadedness. Denies coughing, wheezing or shortness of breath. Denies abdominal pain, nausea, diarrhea or constipation. No new areas of joint pain. Denies new areas of numbness or weakness. Denies new anxiety or depression issues. No new skin problems. Rehabilitation:   PT:  Restrictions/Precautions: General Precautions,Fall Risk  Implants present? : Metal implants ( Loop recorder)  Other position/activity restrictions: up as tolerated; O2 3L/m via NC   Transfers  Sit to Stand: Contact guard assistance; Moderate Assistance (CGA-ModA from chair)  Stand to sit: Contact guard assistance; Moderate Assistance (CGA-ModA from chair)  Comment: uses RW  Ambulation 1  Surface: level tile  Device: Rolling Walker  Other Apparatus: Wheelchair follow  Assistance: Contact guard assistance  Quality of Gait: slow tawanda with step to pattern, flat foot LE at contact, decreased stance on L LE; downward gaze and cervical flexion (slowly improving and becoming more aware, requiring less cues)  Gait Deviations: Decreased step length,Decreased step height,Shuffles,Slow Tawanda  Distance: 70 feet; 60 feet; + short distances between activities  Comments: Pt reported dizziness during AMB (PTA checked pt's SpO2 after she sat to rest and it was 90-91% on RA, PTA reported this to the RN and PM&R doctor.  Pt was put on med hold, to for rest and allow further assessment)          OT:  ADL  Equipment Provided: Reacher,Long-handled sponge  Feeding: Setup,Increased time to complete (pt reports diffulty keeping food on utensils )  Grooming: Setup (of items within reach at sink)  UE Bathing: Setup (of items withing reach at the sink)  LE Bathing: None (pt req no need at this time. )  UE Dressing: Stand by assistance,Verbal cueing  LE Dressing: Minimal assistance (CGA using reacher for threading and pulling over hips, TA TE)  Toileting: None  Additional Comments: QUINONES facilitated pt in ADLs at the sink this AM. During LE dressing pt req intermiitent CGA for proper use of AE (reacher) to increase ease and independence in dressing task. Balance  Sitting Balance: Supervision  Standing Balance: Contact guard assistance   Standing Balance  Time: AM: <1 min X3  Activity: AM: functional transfers, LB dressing  Comment: shower GB and toilet GB  for 1-2 UE supportto decrease risk of falls, no LOB noted   Functional Mobility  Functional - Mobility Device: Wheelchair  Activity: To/from bathroom  Assist Level: Minimal assistance  Functional Mobility Comments: Able to self propel with BUEs however req assist for w/c mgt when entering/exiting shower in prep for SPT     Bed mobility  Rolling to Left: Minimal assistance  Rolling to Right: Minimal assistance  Supine to Sit: Minimal assistance (A trunk in upright position )  Sit to Supine: Moderate assistance (A BLEs )  Scooting: Minimal assistance  Comment: MOD A to return to bed for BLE management due to increased pain and fatigue  Transfers  Stand Pivot Transfers: Contact guard assistance (VC for seuqencing)  Sit to stand: Minimal assistance  Stand to sit: Contact guard assistance  Transfer Comments: 1-2 UE support; VC's for technique; Dycem placed under L foot and shoes on pt to prevent L foot slipping ; increased time to complete   Toilet Transfers  Toilet - Technique: Stand pivot  Equipment Used: Grab bars  Toilet Transfer: Minimal assistance  Toilet Transfers Comments: VC's for hand placement/technique      Shower Transfers  Shower - Transfer From: Wheelchair  Shower - Transfer Type: To and From  Shower - Transfer To:  Shower seat with back  Shower - Technique: Stand pivot  Shower Transfers: Contact Guard  Shower Transfers Comments: 1-2 UE support; VC's for technique; Dysem placed under L foot and shoes on pt to prevent L foot slipping ; increased time to complete, No LOB noted   Wheelchair Bed Transfers  Wheelchair/Bed - Technique: Stand pivot  Equipment Used: Other (RW)  Level of Asssistance: Moderate assistance  Wheelchair Transfers Comments: with RW; Blocking of L foot due to slipping      ST:            Objective:  BP (!) 113/43   Pulse 63   Temp 97.9 °F (36.6 °C)   Resp 18   Ht 5' 3\" (1.6 m)   Wt 180 lb 6.4 oz (81.8 kg)   SpO2 95%   BMI 31.96 kg/m²  I Body mass index is 31.96 kg/m². I   Wt Readings from Last 1 Encounters:   22 180 lb 6.4 oz (81.8 kg)      Temp (24hrs), Av.1 °F (36.7 °C), Min:97.9 °F (36.6 °C), Max:98.2 °F (36.8 °C)         GEN: well developed, well nourished, no acute distress  HEENT: Normocephalic atraumatic, EOMI, mucous membranes pink and moist  CV: RRR, no murmurs, rubs or gallops  PULM:  Respirations WNL and unlabored, palpation sternal area reproduces sternal discomfort-no change  ABD: soft, NT, ND, +BS and equal  NEURO: A&O x3. Sensation intact to light touch. MSK: At least antigravity to 4 -/5 strength  EXTREMITIES: No calf tenderness to palpation bilaterally. No edema BLEs  SKIN: warm dry and intact with good turgor  PSYCH: appropriately interactive. Affect WNL.           Medications   Scheduled Meds:   predniSONE  40 mg Oral Daily    azithromycin  250 mg Oral Daily    ipratropium-albuterol  1 ampule Inhalation 4x daily    losartan  100 mg Oral Daily    amLODIPine  5 mg Oral Daily    gabapentin  200 mg Oral BID    lidocaine  1 patch TransDERmal Daily    busPIRone  5 mg Oral TID    apixaban  5 mg Oral BID    atorvastatin  40 mg Oral Nightly    calcium carbonate w/vitamin D  1 tablet Oral Daily    carvedilol  12.5 mg Oral BID    citalopram  20 mg Oral Daily    fenofibrate  160 mg Oral Daily    ferrous sulfate  325 mg Oral BID    furosemide  20 mg Oral BID    pramipexole  0.5 mg Oral Nightly    cyanocobalamin  1,000 mcg Oral Daily    polyethylene glycol  17 g Oral Daily     Continuous Infusions:  PRN Meds:.albuterol, guaiFENesin, traMADol, melatonin, magnesium hydroxide, docusate sodium, acetaminophen, senna, bisacodyl     Diagnostics:     CBC:   Recent Labs     03/31/22  0645   WBC 5.8   RBC 3.37*   HGB 10.7*   HCT 31.5*   MCV 93.6   RDW 13.7        BMP:   Recent Labs     03/31/22  0645      K 4.6      CO2 26   BUN 64*   CREATININE 1.36*     BNP: No results for input(s): BNP in the last 72 hours. PT/INR: No results for input(s): PROTIME, INR in the last 72 hours. APTT: No results for input(s): APTT in the last 72 hours. CARDIAC ENZYMES: No results for input(s): CKMB, CKMBINDEX, TROPONINT in the last 72 hours. Invalid input(s): CKTOTAL;3  FASTING LIPID PANEL:  Lab Results   Component Value Date    CHOL 103 05/20/2019    HDL 74 03/12/2021    TRIG 66 05/20/2019     LIVER PROFILE: No results for input(s): AST, ALT, ALB, BILIDIR, BILITOT, ALKPHOS in the last 72 hours. I/O (24Hr): Intake/Output Summary (Last 24 hours) at 4/1/2022 1322  Last data filed at 4/1/2022 0546  Gross per 24 hour   Intake 480 ml   Output 800 ml   Net -320 ml       Glu last 24 hour  No results for input(s): POCGLU in the last 72 hours. No results for input(s): CLARITYU, COLORU, PHUR, SPECGRAV, PROTEINU, RBCUA, BLOODU, BACTERIA, NITRU, WBCUA, LEUKOCYTESUR, YEAST, GLUCOSEU, BILIRUBINUR in the last 72 hours. Chest x-ray 3/23/2022  Old left rib fractures noted.  Examination is otherwise unremarkable.         3/29/22 chest x-ray  Impression:        No acute cardiopulmonary disease           Impression/Plan:    1. Ataxia with L sided weakness:  PT/OT for gait, mobility, strengthening, endurance, ADLs, and self care. On Pramipexole discharge plan 4/6  2. Cervical myelopathy: Hx C3-6 decompression. Has gabapentin and prn Tylenol for pain.   3.  sternal discomfort reproducible -suspect costochondritis, patient unable to have anti-inflammatories-continue Ultram, cardiac sija-os-wfgwqwxk medicine evaluated, suspect bronchitis -current steroids for bronchitis should help costochondritis  4. Pulmonary-, Dr. Staerns Economy bronchitis, on steroids and Zithromax, patient doing better, O2 needs on Proventil nebulizer, DuoNeb and Robitussin and prednisone-clarify when prednisone taper, may need home O2 evaluation  5. Chronic diastolic heart failure/HTN/CAD: on amlodipine, lipitor, carvedilol, fenofibrate, , furosemide-Norvasc decreased, started on Cozaar by internal medicine, monitor, chest x-ray negative 3/29  6. Major depressive disorder: on celexa. -addition of BuSpar by internal medicine, consider psych evaluation if patient agreeable-patient does not want psych evaluation at this time, denies suicidal /homicidal ideation continue to monitor, patient notes will notify if would like eval  7. Chronic DVT: on eliquis - monitor with history of falls  8. Anemia: Hb near normal. On ferrous sulfate. Monitor , check stool pending  9. Lower extremity cellulitis: completed Keflex   10. BIN:. Monitoring BMP,  on Lasix lisinopril-creatinine trending up 64/1.36, ? decrease Lasix/push fluids , concern of CHF, internal medicine jaeawr-bd-crjzeia in a.m. 11. Restless left leg -Mirapex  12. Insomnia: has melatonin prn   13. Pain-Lidoderm patch to right shoulder Neurontin  14. Bowel Management: Miralax daily, senokot prn, dulcolax prn. 15. DVT Prophylaxis:  SCD's while in bed, PRAVEEN's  and Eliquis  16. Internal medicine for medical management        Bettina Krabbe. Akanksha Henriquez MD       This note is created with the assistance of a speech recognition program.  While intending to generate a document that actually reflects the content of the visit, the document can still have some errors including those of syntax and sound a like substitutions which may escape proof reading.   In such instances, actual meaning can be extrapolated by contextual diversion

## 2022-04-01 NOTE — PLAN OF CARE
Nutrition Problem #1: Overweight/Obese  Intervention: Food and/or Nutrient Delivery: Continue Current Diet  Nutritional Goals: Avoid wt gain

## 2022-04-01 NOTE — PLAN OF CARE
Problem: Skin Integrity:  Goal: Will show no infection signs and symptoms  Description: Will show no infection signs and symptoms  4/1/2022 0209 by Erin Agudelo RN  Outcome: Ongoing  3/31/2022 1510 by Veronica Aguilar RN  Outcome: Ongoing  Note: No signs of increased skin or tissue breakdown is noted. See Head to Toe/LDA assessments in flowsheets. Goal: Absence of new skin breakdown  Description: Absence of new skin breakdown  4/1/2022 0209 by Erin Agudelo RN  Outcome: Ongoing  3/31/2022 1510 by Veronica Aguilar RN  Outcome: Ongoing     Problem: Falls - Risk of:  Goal: Will remain free from falls  Description: Will remain free from falls  4/1/2022 0209 by Erin Agudelo RN  Outcome: Ongoing  3/31/2022 1510 by Veronica Aguilar RN  Outcome: Ongoing  Note: Patient remains free of falls and injuries throughout shift. Bed remains in the lowest position, wheels locked, call light and bedside table are within reach. Goal: Absence of physical injury  Description: Absence of physical injury  4/1/2022 0209 by Erin Agudelo RN  Outcome: Ongoing  3/31/2022 1510 by Veronica Aguilar RN  Outcome: Ongoing     Problem: Pain:  Goal: Pain level will decrease  Description: Pain level will decrease  4/1/2022 0209 by Erin Augdelo RN  Outcome: Ongoing  3/31/2022 1510 by Veronica Aguilar RN  Outcome: Ongoing  Note: Patient's pain is well controlled with current regimen. See MAR.    Goal: Control of acute pain  Description: Control of acute pain  4/1/2022 0209 by Erin Agudelo RN  Outcome: Ongoing  3/31/2022 1510 by Veronica Aguilar RN  Outcome: Ongoing  Goal: Control of chronic pain  Description: Control of chronic pain  4/1/2022 0209 by Erin Agudelo RN  Outcome: Ongoing  3/31/2022 1510 by Veronica Aguilar RN  Outcome: Ongoing     Problem: Mobility - Impaired:  Goal: Mobility will improve  Description: Mobility will improve  4/1/2022 0209 by Erin Agudelo RN  Outcome: Ongoing  3/31/2022 1510 by Vijay Silverman RN  Outcome: Ongoing     Problem: Musculor/Skeletal Functional Status  Goal: Highest potential functional level  4/1/2022 0209 by Jerson Spencer RN  Outcome: Ongoing  3/31/2022 1510 by Vijay Silverman RN  Outcome: Ongoing  Goal: Absence of falls  4/1/2022 0209 by Jerson Spencer RN  Outcome: Ongoing  3/31/2022 1510 by Vijay Silverman RN  Outcome: Ongoing     Problem: Nutrition  Goal: Optimal nutrition therapy  4/1/2022 0209 by Jerson Spencer RN  Outcome: Ongoing  3/31/2022 1510 by Vijay Silverman RN  Outcome: Ongoing

## 2022-04-01 NOTE — PROGRESS NOTES
[facility] Internal Medicine    CONSULTATION / HISTORY AND PHYSICAL EXAMINATION            Date:   3/31/2022  Patient name:  Rozann Kehr  Date of admission:  3/23/2022  4:52 PM  MRN:   [id number]  Account:  [de-identified]  YOB: 1950  PCP:    Xavi Moran MD  Room:   [facility]-  Code Status:    Full Code    Physician Requesting Consult: Royal Linette MD    Reason for Consult:  Medical management    Chief Complaint:     No chief complaint on file.   Debility    History Obtained From:     Patient, EMR, nursing staff    History of Present Illness:     67-year-old female initially presented to the multiple falls over several weeks in the setting of chronic cervical spine stenosis with myelopathy status post cervical fusion follows with neurosurgery  Recently started on Eliquis for acute DVT right leg  Trauma work-up CT brain in this admission unremarkable other than multiple remote and healing rib fractures, patient uses walker at home  Neurology review was obtained for worsening gait instability-MRI thoracic spine did show T2-T3 and T5-T7 moderate neural foraminal narrowing  Also patient was noted to have bilateral leg swelling with some stasis dermatitis, early cellulitis-started on diuresis as well as antibiotics  3/26   Patient feeling better  Feels that anxiety is better after starting buspar   Working with PT  3/28 ]  Patient complaining of cough, wheezing  She told the nurse that, cough is going on since she started lisinopril  Had wheezing, which improved with nebulization  3/29   Patient complaining of cough, shortness of breath, wheezing  Chest x-ray seen,  Had EKG, troponins which are flat  Past Medical History:     Past Medical History:   Diagnosis Date    Allergic rhinitis, cause unspecified     Back pain     lumbar    Bowel obstruction (HCC)     history of due to scar tissue, resolved non-surgically    C. difficile diarrhea     CAD (coronary POSTERIOR C3-6 LAMINECTOMY, PARTIAL C7 LAMINECTOMY, FUSION C3-C6, SILVERCORD    CERVICAL FUSION N/A 5/21/2021    POSTERIOR C3-6 LAMINECTOMY, PARTIAL C7 LAMINECTOMY, FUSION C3-C6, SILVERCORD performed by Ryan Hyman DO at [address]    12 INCHES REMOVED D/T OBSTRUCTION    COLONOSCOPY      CYST REMOVAL Right     right facial    HYSTERECTOMY  1973    taken as a result of recurring cysts    LUMBAR FUSION N/A 02/10/2020    LUMBAR L4-5 POSTERIOR  DECOMPRESSION INSTRUMENTATION FUSION WCEMENT AUGMENTATION/ performed by Roderick Foster MD at [facility] N/A 06/17/2020    L5-S1 PLIF L4-L5 REVISION performed by Roderick Foster MD at [address] [address]  08/14/2014    FESS    OVARY REMOVAL  1970    UNILATERAL due to cyst    OVARY REMOVAL  1971    partial, due to cyst    SINUS SURGERY  2004    UPPER GASTROINTESTINAL ENDOSCOPY N/A 05/31/2019    EGD ESOPHAGOGASTRODUODENOSCOPY performed by Keanu Maher MD at [address] [facility] N/A 08/05/2019    EGD BIOPSY performed by Juwan Calhoun MD at [address] [facility] N/A 08/23/2019    EGD BIOPSY performed by Keanu Maher MD at [address] [facility] 03/05/2019    WRIST OPEN REDUCTION INTERNAL FIXATION performed by Estevan Alarcon MD at [address]        Medications Prior to Admission:     Prior to Admission medications    Medication Sig Start Date End Date Taking?  Authorizing Provider   amLODIPine (NORVASC) 10 MG tablet Take 1 tablet by mouth daily 3/10/22   Jennifer Santos MD   furosemide (LASIX) 40 MG tablet Take 1 tablet by mouth 2 times daily 3/1/22   MAIK Rodgers CNP   carvedilol (COREG) 12.5 MG tablet Take 1 tablet by mouth 2 times daily 2/23/22   MAIK Rodgers CNP   apixaban (ELIQUIS) 5 MG TABS tablet Please take 2 tablets by mouth for the first week then take 1 tablet by mouth BID for acute DVT  Patient taking differently: Take 5 mg by mouth 2 times daily take 1 tablet by mouth BID for acute DVT 2/17/22   MAIK Page CNP   atorvastatin (LIPITOR) 80 MG tablet Take 0.5 tablets by mouth nightly 2/1/22   MAIK Page CNP   lisinopril (PRINIVIL;ZESTRIL) 30 MG tablet Take 1 tablet by mouth daily 1/21/22   Rebekah Wall MD   fenofibrate micronized (LOFIBRA) 134 MG capsule Take 1 capsule by mouth every morning (before breakfast) 1/13/22   Rebekah Wall MD   pramipexole (MIRAPEX) 0.5 MG tablet Take 1 tablet by mouth nightly 1/6/22   Rebekah Wall MD   citalopram (CELEXA) 20 MG tablet Take 1 tablet by mouth daily 1/3/22   Clarita Schneider MD   nitroGLYCERIN (NITROSTAT) 0.4 MG SL tablet Place 1 tablet under the tongue every 5 minutes as needed for Chest pain 9/1/21   Rebekah Wall MD   docusate sodium (COLACE) 100 MG capsule Take 1 capsule by mouth 2 times daily as needed for Constipation 5/30/21   Brady Fear Marley, DO   diphenhydrAMINE HCl (BENADRYL ALLERGY PO) Take 1 capsule by mouth daily Takes q HS    Historical Provider, MD   cyanocobalamin ([employer] VITAMIN B12) 1000 MCG tablet Take 1 tablet by mouth daily 12/3/20   Rebekah Wall MD   ferrous sulfate (IRON 325) 325 (65 Fe) MG tablet Take 1 tablet by mouth 2 times daily 7/2/20   Moisés Schaeffer MD   VITAMIN D, ERGOCALCIFEROL, PO Take by mouth    Historical Provider, MD   Lancets MISC 1 each by Does not apply route daily 10/10/19   Aaliyah Norton MD   blood glucose monitor strips Test 2 times a day & as needed for symptoms of irregular blood glucose. 10/10/19   Aaliyah Norton MD   Calcium Carbonate-Vitamin D (CALCIUM 500/D) 500-125 MG-UNIT TABS Take 1 tablet by mouth daily     Historical Provider, MD        Allergies:     Bactrim [sulfamethoxazole-trimethoprim], Codeine, and Seasonal    Social History:     Tobacco:    reports that she quit smoking about 4 years ago. Her smoking use included cigarettes.  She started smoking about 26 650 ml   Net -170 ml       General Appearance:  alert, well appearing, and in no acute distress  Head:  normocephalic, atraumatic. Eye: no icterus, redness, pupils equal and reactive, extraocular eye movements intact, conjunctiva clear  Ear: normal external ear, no discharge, hearing intact  Nose:  no drainage noted  Mouth: mucous membranes moist  Neck: supple, no carotid bruits, thyroid not palpable  Lungs: Air entry but decreased, better wheezing present  Cardiovascular: normal rate, regular rhythm, no murmur, gallop, rub.   Abdomen: Soft, nontender, nondistended, normal bowel sounds, no hepatomegaly or splenomegaly  Neurologic: There are no new focal motor or sensory deficits, normal muscle tone and bulk, no abnormal sensation, normal speech, cranial nerves II through XII grossly intact  Skin: No gross lesions, rashes, bruising or bleeding on exposed skin area  Extremities:  peripheral pulses palpable, no pedal edema or calf pain with palpation  Psych: normal affect    Investigations:      Laboratory Testing:  Recent Results (from the past 24 hour(s))   CBC auto differential    Collection Time: 03/31/22  6:45 AM   Result Value Ref Range    WBC 5.8 3.5 - 11.0 k/uL    RBC 3.37 (L) 4.0 - 5.2 m/uL    Hemoglobin 10.7 (L) 12.0 - 16.0 g/dL    Hematocrit 31.5 (L) 36 - 46 %    MCV 93.6 80 - 100 fL    MCH 31.8 26 - 34 pg    MCHC 34.0 31 - 37 g/dL    RDW 13.7 11.5 - 14.9 %    Platelets 211 657 - 837 k/uL    MPV 7.3 6.0 - 12.0 fL    Seg Neutrophils 61 36 - 66 %    Lymphocytes 27 24 - 44 %    Monocytes 10 (H) 1 - 7 %    Eosinophils % 1 0 - 4 %    Basophils 1 0 - 2 %    Segs Absolute 3.60 1.3 - 9.1 k/uL    Absolute Lymph # 1.60 1.0 - 4.8 k/uL    Absolute Mono # 0.60 0.1 - 1.3 k/uL    Absolute Eos # 0.10 0.0 - 0.4 k/uL    Basophils Absolute 0.00 0.0 - 0.2 k/uL   Basic Metabolic Panel    Collection Time: 03/31/22  6:45 AM   Result Value Ref Range    Glucose 104 (H) 70 - 99 mg/dL    BUN 64 (H) 8 - 23 mg/dL    CREATININE 1.36 (H) 0.50 - 0.90 mg/dL    Calcium 9.0 8.6 - 10.4 mg/dL    Sodium 140 135 - 144 mmol/L    Potassium 4.6 3.7 - 5.3 mmol/L    Chloride 101 98 - 107 mmol/L    CO2 26 20 - 31 mmol/L    Anion Gap 13 9 - 17 mmol/L    GFR Non-African American 38 (L) >60 mL/min    GFR  46 (L) >60 mL/min    GFR Comment             Imaging/Diagonstics:  Recent data reviewed    Assessment :      Primary Problem  Cervical myelopathy [facility])    Active Hospital Problems    Diagnosis Date Noted    Cervical myelopathy ([facility] [address] [G95.9] 03/17/2022       Plan:     Multiple falls, gait instability, multiple remote and healing rib fractures leading to chest pain-needs PT OT  Neural foraminal narrowing of thoracic spine-neurology as reviewed-recommend rehab and physical therapy  Hypertension-continue amlodipine, Coreg  Right leg DVT-continue Eliquis  Chronic diastolic CHF-currently compensated-continue Coreg, Lipitor, Lasix, lisinopril  Depression -patient noted to be very tearful today-is already on maximal dose of  Celexa, will add BuSpar    DVT prophylaxis-patient on Eliquis    3/27   But has readings of low blood pressure, asymptomatic  Reducing dose of Norvasc to 5 from 10    3/28   Patient has chronic cough, started since she is put on ACE inhibitor  May have ACE inhibitor induced cough, will discontinue lisinopril, start patient on Cozaar  Started patient given nebulization  Has history of smoking  Chest x-ray seen   3/29   Suspecting symptoms are due to acute bronchitis,  Ordering Z-Han, prednisone  EKG seen, troponins are flat  Chest x-ray seen  We will monitor  3/31  Patient shortness of breath is getting better being on steroids  We will plan to cut down oxygen  Consultations:     Jai Chiu MD  3/31/2022  9:34 PM    Copy sent to Dr. Jovi Hernandez MD    Please note that this chart was generated using voice recognition Dragon dictation software. Although every effort was made to ensure the accuracy of this automated transcription, some errors in transcription may have occurred.

## 2022-04-01 NOTE — PROGRESS NOTES
Comprehensive Nutrition Assessment    Type and Reason for Visit:  Reassess    Nutrition Recommendations/Plan:  Continue current diet. Nutrition Assessment:  Pt states she was told she is not diabetic. However, she would like to continue the carbohydrate control diet. PO intake is good with % consumed at meals. Malnutrition Assessment:  Malnutrition Status:  No malnutrition    Context:  Acute Illness     Findings of the 6 clinical characteristics of malnutrition:  Energy Intake:  No significant decrease in energy intake  Weight Loss:  No significant weight loss     Body Fat Loss:  No significant body fat loss     Muscle Mass Loss:  No significant muscle mass loss    Fluid Accumulation:  1 - Mild (Not related to nutritional status) Extremities   Strength:  Not Performed    Estimated Daily Nutrient Needs:  Energy (kcal):  9530-0938 kcals based on [facility] with 1.2-1.3 factor using adm wt 81.2 kg; Weight Used for Energy Requirements:  Admission     Protein (g):  62-68 gm protein based on 1.2-1.3 gm/kg using IBW; Weight Used for Protein Requirements:  Ideal          Nutrition Related Findings:  Edema: +2 pitting RLE, +1 pitting LLE. Glucose: 104 (3/31), A1C: 5.1 (2/1). Deltasone. Other labs and meds reviewed. Hx: Pre-Diabetes. Wounds:  None       Current Nutrition Therapies:    ADULT DIET; Regular; 4 carb choices (60 gm/meal)    Anthropometric Measures:  · Height: 5' 3\" (160 cm)  · Current Body Weight: 180 lb 5.4 oz (81.8 kg)   · Admission Body Weight: 179 lb (81.2 kg)    · Ideal Body Weight: 115 lbs; % Ideal Body Weight 155.7 %   · BMI: 32  · BMI Categories: Obese Class 1 (BMI 30.0-34. 9)       Nutrition Diagnosis:   · Overweight/Obese related to excessive energy intake (Hx of) as evidenced by BMI    Nutrition Interventions:   Food and/or Nutrient Delivery:  Continue Current Diet  Nutrition Education/Counseling:  No recommendation at this time   Coordination of Nutrition Care:  Continue to monitor while inpatient    Goals:  Avoid wt gain       Nutrition Monitoring and Evaluation:   Behavioral-Environmental Outcomes:  None Identified   Food/Nutrient Intake Outcomes:  Food and Nutrient Intake  Physical Signs/Symptoms Outcomes:  Biochemical Data,Fluid Status or Edema,Nutrition Focused Physical Findings,Skin,Weight     Discharge Planning:    Continue current diet     Some areas of assessment may be incomplete due to standard COVID-19 Precautions. Casi Patterson R.D., L.D.   Phone: [phone]

## 2022-04-01 NOTE — DISCHARGE SUMMARY
Physical Medicine & Rehabilitation  Discharge Summary     Patient Identification:  Cam Garay  : 1950  Admit date: 3/23/2022  Discharge date: 2022  Primary care provider: Mayur Vazquez MD     Problem List:    Ataxia of left hemiparesis  Cervical myelopathy status post C3 6 decompression  Costochondritis  Bronchitis  Chronic diastolic heart failure  Hypertension  Coronary artery disease  Depression  Chronic DVT  Anemia  Acute kidney injury      Patient Active Problem List   Diagnosis    Other specified disorders of rotator cuff syndrome of shoulder and allied disorders    Diverticulosis of large intestine    Intestinal or peritoneal adhesions with obstruction (postoperative) (postinfection) (HCC)    Restless legs syndrome (RLS)    GERD (gastroesophageal reflux disease)    Essential hypertension    Mixed hyperlipidemia    Other abnormal glucose    Atherosclerosis    Allergic rhinitis    Vitamin D deficiency    Depression    Degenerative joint disease (DJD) of hip    Peripheral edema    Injury of foot, left    Facial droop    CVA (cerebral vascular accident) ([address]    Bradycardia    Ataxia    Aphasia    TIA (transient ischemic attack)    Need for prophylactic vaccination and inoculation against cholera alone    Osteopenia    Lumbago    Meralgia paresthetica of right side    Lumbar degenerative disc disease    Spondylosis of lumbar region without myelopathy or radiculopathy    Blood poisoning    Cellulitis of left lower extremity    Encounter for medication monitoring    Deep vein thrombosis (DVT) of right lower extremity ([address]    Chronic deep vein thrombosis (DVT) of proximal vein of both lower extremities (HCC)    Chronic diastolic heart failure (HCC)    Neurogenic claudication due to lumbar spinal stenosis    Sacroiliitis ([address]    Stage 3 chronic kidney disease ([address]    Type 2 diabetes mellitus with circulatory disorder, without long-term current use of insulin ([facility]    Chronic deep vein thrombosis (DVT) of popliteal vein of right lower extremity (HCC)    Closed fracture of left wrist    B12 deficiency    Iron deficiency anemia secondary to inadequate dietary iron intake    Closed head injury    Scalp laceration    Abnormal finding on imaging    Other irritable bowel syndrome    Frequent falls    Double vision    Muscle soreness    Peptic ulcer    Melena    PUD (peptic ulcer disease)    Absolute anemia    Acute blood loss anemia    Acute renal failure (HCC)    Clostridium difficile infection    Acquired spondylolisthesis    Spinal stenosis of lumbar region with neurogenic claudication    Acute deep vein thrombosis (DVT) of proximal vein of left lower extremity (HCC)    Leg swelling    Back pain    Neurological disorder    Closed unstable burst fracture of fifth lumbar vertebra with routine healing    Slow transit constipation    Major depressive disorder, recurrent, moderate (HCC)    Acute deep vein thrombosis (DVT) of femoral vein of left lower extremity (HCC)    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 left-handed female admitted to the [facility] Acute Rehabiliation unit on 3/23/2022. She was originally admitted to [facility] on 3/16/2022 with Leg Pain, Shortness of Breath, Fall, and Neck Pain     She has known history of cervical spine fusion, stenosis cervical spine with myelopathy who presented with leg pain and shortness of breath and fall as well as neck pain she is on multiple falls recently. Phylicia Perez had bilateral lower lobe edema has known history of DVT is being treated with Eliquis.     Internal medicine: MRI showed T2-3 and T5 7 moderate neural foraminal narrowing.  Treated lower extremity edema with Lasix      Neurology: History of C3-6 laminectomies with Dr. Malik Bernard last year. History of 1 year L arm weakness and left leg weakness prior to cervical spine surgery.  She continues to have frequent falls at home. Checked lumbar MRI and ordered gabapentin. Noted patient is having orthostatic blood pressures.     Neurosurgery outpatient note from 3/4 noted ataxia left hand with lost dexterity and falls. Dr. Malik Bernard referred to neurology for workup of gait instability and ataxia.      Patient was seen by me in the office 2/15/22 at which time inpatient acute rehabilitation admission was recommended and her insurance denial was in process of expedited appeal when she admitted to the hospital.    Inpatient Rehabilitation Course:   Alecia Ramesh was admitted to inpatient rehabilitation on 3/23/2022. Patient of costochondritis and developed bronchitis treated with steroids and Zithromax by internal medicine. Patient appears to be improving, steroids also helped costochondritis. .  At depression noted addition of BuSpar by internal medicine-patient improved. Did not want psych evaluation. Continued cough and increased wheezing mild increase in creatinine given 4-day course of steroids and weaned oxygen. Patient improved. Oxygen tapered off home O2 evaluation completed-did not qualify. Lasix resumed most dose on discharge    Rehab course:  Patient progressed well and benefit from acute inpatient rehabilitation    Discharge status:  good    Discharge Dispostiion:   Home with Home Health Care-PT/OT      The patient participated in an aggressive multidisciplinary inpatient rehabilitation program involving 3 hours per day, 5 days per week of rehabilitation. Patient benefited from inpatient rehab and was discharged in good and stable condition.     Consults:   Internal medicine      Significant Diagnostics:   CBC:   Lab Results   Component Value Date    WBC 7.6 04/07/2022    RBC 3.80 04/07/2022    HGB 11.7 04/07/2022    HCT 35.3 04/07/2022    MCV 92.8 04/07/2022    MCH 30.8 04/07/2022    MCHC 33.2 04/07/2022    RDW 13.7 04/07/2022     04/07/2022    MPV 7.0 04/07/2022     BMP:    Lab Results   Component Value Date     04/11/2022    K 4.1 04/11/2022     04/11/2022    CO2 24 04/11/2022    BUN 36 04/11/2022    LABALBU 4.3 03/23/2022    CREATININE 1.07 04/11/2022    CALCIUM 9.0 04/11/2022    GFRAA >60 04/11/2022    LABGLOM 51 04/11/2022    GLUCOSE 137 04/11/2022       Discharge Functional Status:    Physical therapy:  Bed Mobility: Rolling: Minimal assistance  Supine to Sit: Contact guard assistance  Sit to Supine: Contact guard assistance  Scooting: Independent  Transfers: Sit to Stand: Supervision  Stand to sit: Supervision  Bed to Chair: Supervision, Ambulation 1  Surface: level tile  Device: Rolling Walker  Other Apparatus: Wheelchair follow  Assistance: Stand by assistance  Quality of Gait: Slow tawanda, Lack of hip and knee flexion on LLE, minimizing step height. Continued cues to  LLE more and promote Heel-toe strike, with poor carryover. Slight fwd flexed head posture with cues to correct. WBOS noted, but no LOB. Pt does drift to the left, educated pt to stop every few steps and reallign  herself with the rolling walker. Gait Deviations: Slow Tawanda,Decreased step length,Decreased step height  Distance: 155 feet & short distances in the gym  Comments: SpO2 96% with exertion at room air, pulse 73, Stairs  # Steps : 10  Stairs Height:  (4\" & 6\")  Rails: Right ascending  Device: No Device  Assistance: Contact guard assistance  Comment: step-to pattern. Cues to remind pt, \"up with good, down with bad\" Pt also practises 4: step up at the door frame, supporting herself on door frame, CGA needed for safety-simulated for home entry.    Mobility:  , PT Equipment Recommendations  Other: Pt has hospital bed, lift chair, rolling walker, and a Rollator at home., Assessment: Pt presents with frequent falls at home, has generalized weakness, and debility, but L LE weaker thant R LE. Pt with decreased balance, decreased motor planning and weakness contributing to her falls. Pt has made progresss towards her goals, continues to needs assist for sit to stand , as well as cues to remind her for proper L foot placemtn, and to lean forward for sit<>stand. Pt reports she ahs been using a lift chair at home to assist her for si to stand. Pt tolerated activity at room air today. Pt pleased with her progress, educated in continueing her HEP at home. Pt will be starting Home PT and transitioning to outpatient therapy at later date. Pt is a fall risk at this time     Occupational therapy:  , Equipment Recommendations  Equipment Needed:  (TBD),      Speech therapy:       Patient Instructions:    follow-up with 1. PCP Dr. Millicent Shen 2. Rehab-Dr. Luigi Rivera ,CBC/BMP 1 week to PCP, home PT/OT/nursing      Medications, precautions and follow up reviewed with patient and family    Follow-up visits: See after visit summary from hospitalization    Discharge Medications:     Medication List      START taking these medications    acetaminophen 325 MG tablet  Commonly known as: TYLENOL  Take 2 tablets by mouth every 4 hours as needed for Pain     albuterol-ipratropium  MCG/ACT Aers inhaler  Commonly known as: COMBIVENT RESPIMAT  Inhale 1 puff into the lungs every 6 hours as needed for Wheezing or Shortness of Breath     benzonatate 100 MG capsule  Commonly known as: TESSALON  Take 1 capsule by mouth 3 times daily as needed for Cough     busPIRone 5 MG tablet  Commonly known as: BUSPAR  Take 1 tablet by mouth 3 times daily  Notes to patient: Anti-depressant     gabapentin 100 MG capsule  Commonly known as: NEURONTIN  Take 2 capsules by mouth 2 times daily for 30 days. Notes to patient: Nerve pain     lidocaine 4 % external patch  Place 1 patch onto the skin daily as needed (shoulder pain) Apply patch to shoulder. Do not place over chest/sternum.   Patch may remain in place for up to 12 hours in any 24 hour period. losartan 100 MG tablet  Commonly known as: COZAAR  Take 1 tablet by mouth daily  Notes to patient: cardiac     melatonin 3 MG Tabs tablet  Take 2 tablets by mouth nightly as needed (insomnia)     traMADol 50 MG tablet  Commonly known as: ULTRAM  Take 1 tablet by mouth every 12 hours as needed (severe pain) for up to 5 days. CHANGE how you take these medications    amLODIPine 5 MG tablet  Commonly known as: Norvasc  Take 1 tablet by mouth daily  What changed:   · medication strength  · how much to take  Notes to patient: cardiac     apixaban 5 MG Tabs tablet  Commonly known as: Eliquis  Take 1 tablet by mouth 2 times daily  What changed:   · how much to take  · how to take this  · when to take this  · additional instructions  Notes to patient: Blood thinner     atorvastatin 40 MG tablet  Commonly known as: LIPITOR  Take 1 tablet by mouth nightly  What changed: medication strength  Notes to patient: High cholesterol     furosemide 20 MG tablet  Commonly known as: LASIX  Take 1 tablet by mouth daily  What changed:   · medication strength  · how much to take  · when to take this  Notes to patient: diuretic        CONTINUE taking these medications    blood glucose test strips  Test 2 times a day & as needed for symptoms of irregular blood glucose.      Calcium 500/D 500-125 MG-UNIT Tabs  Generic drug: Calcium Carbonate-Vitamin D  Notes to patient: Bone health     carvedilol 12.5 MG tablet  Commonly known as: COREG  Take 1 tablet by mouth 2 times daily  Notes to patient: cardiac     citalopram 20 MG tablet  Commonly known as: CELEXA  Take 1 tablet by mouth daily  Notes to patient: Anti-depressant     cyanocobalamin 1000 MCG tablet  Commonly known as: CVS VITAMIN B12  Take 1 tablet by mouth daily  Notes to patient: 4/13/22     docusate sodium 100 MG capsule  Commonly known as: Colace  Take 1 capsule by mouth 2 times daily as needed for Constipation     fenofibrate micronized 134 MG capsule  Commonly known as: LOFIBRA  Take 1 capsule by mouth every morning (before breakfast)     ferrous sulfate 325 (65 Fe) MG tablet  Commonly known as: IRON 325  Take 1 tablet by mouth 2 times daily  Notes to patient: anemia     Lancets Misc  1 each by Does not apply route daily     nitroGLYCERIN 0.4 MG SL tablet  Commonly known as: Nitrostat  Place 1 tablet under the tongue every 5 minutes as needed for Chest pain  Notes to patient: Chest pain     pramipexole 0.5 MG tablet  Commonly known as: Mirapex  Take 1 tablet by mouth nightly  Notes to patient: sleep     VITAMIN D (ERGOCALCIFEROL) PO        STOP taking these medications    BENADRYL ALLERGY PO     lisinopril 30 MG tablet  Commonly known as: PRINIVIL;ZESTRIL           Where to Get Your Medications      These medications were sent to [facility]  [address]  [address], [address] [facility] [address]    Phone: [phone]   · albuterol-ipratropium  MCG/ACT Aers inhaler  · amLODIPine 5 MG tablet  · apixaban 5 MG Tabs tablet  · atorvastatin 40 MG tablet  · benzonatate 100 MG capsule  · busPIRone 5 MG tablet  · carvedilol 12.5 MG tablet  · citalopram 20 MG tablet  · fenofibrate micronized 134 MG capsule  · furosemide 20 MG tablet  · gabapentin 100 MG capsule  · losartan 100 MG tablet  · pramipexole 0.5 MG tablet  · traMADol 50 MG tablet     You can get these medications from any pharmacy    You don't need a prescription for these medications  · acetaminophen 325 MG tablet  · lidocaine 4 % external patch  · melatonin 3 MG Tabs tablet          Marcus Urena MD

## 2022-04-01 NOTE — PROGRESS NOTES
[address]   ACUTE REHABILITATION OCCUPATIONAL THERAPY  DAILY NOTE    Date: 22  Patient Name: Chino Tee      Room: 2275/7844-51    MRN: [id number]   : 1950  (71 y.o.)  Gender: female   Referring Practitioner: Flavia Bravo MD  Diagnosis: Cervical myelopathy  Additional Pertinent Hx: 71 y.o.  female, with a history of anemia, spondylolisthesis, chronic DVT, chronic diastolic heart failure, CVA, major depressive disorder, s/p cervical spine fusion, stenosis of cervical spine with myelopathy, and DM type II, who presents with leg pain, shortness of breath, fall, and neck pain. According to patient and her , patient has had multiple falls recently including a fall Monday and evening and again on Tuesday. Patient reports that today she felt extremely weak upon waking. Restrictions  Restrictions/Precautions: General Precautions,Fall Risk  Implants present? : Metal implants (Loop recorder)  Other position/activity restrictions: up as tolerated; O2 3L/m via NC  Required Braces or Orthoses?: No  Equipment Used: Other (RW)    Subjective  Subjective: \"I feel pretty good\". Comments: Pt pleasent and agreeable for OT treatment. Patient Currently in Pain: Denies  Restrictions/Precautions: General Precautions; Fall Risk  Overall Orientation Status: Within Functional Limits     Pain Assessment  Pain Assessment: 0-10  Pain Level: 8  Pain Type: Acute pain  Pain Location: Chest (only when coughing)  Pain Orientation: Mid    Objective  Cognition  Overall Cognitive Status: WFL  Perception  Overall Perceptual Status: WFL  Balance  Standing Balance: Contact guard assistance  Transfers  Stand Pivot Transfers: Contact guard assistance (VC for seuqencing)  Sit to stand: Minimal assistance  Stand to sit: Contact guard assistance  Transfer Comments: 1-2 UE support; VC's for technique;  Dycem placed under L foot and shoes on pt to prevent L foot slipping ; increased time to complete  Standing Balance  Time: <1 min X4  Activity: functional transfers, LB dressing  Comment: 1-2 UE support  Functional Mobility  Functional - Mobility Device: Wheelchair  Activity: To/from bathroom  Assist Level: Minimal assistance  Fine Motor: Pt instruction in [facility] and instrinsic hand strengthening for ease and safety with ADLs. Pt completes ADLs boards with increased time. ; PM: pt places small pins in slots with correct orientation. increased time req. Next engaging in game of Trouble for hand strengthening to press pop socket and roll dice. ADL  Grooming: Setup (of items within reach at sink)  UE Bathing: Setup (of items withing reach at the sink)  LE Bathing: None (pt req no need at this time. )  UE Dressing: Stand by assistance;Verbal cueing  LE Dressing: Minimal assistance (CGA using reacher for threading and pulling over hips, TA TE)  Additional Comments: QUINONES facilitated pt in ADLs at the sink this AM. During LE dressing pt req intermiitent CGA for proper use of AE (reacher) to increase ease and independence in dressing task. Assessment  Performance deficits / Impairments: Decreased functional mobility ; Decreased ADL status; Decreased strength;Decreased endurance;Decreased sensation;Decreased balance;Decreased high-level IADLs;Decreased fine motor control;Decreased coordination  Prognosis: Good  Discharge Recommendations: Patient would benefit from continued therapy after discharge  Activity Tolerance: Patient Tolerated treatment well  Safety Devices in place: Yes  Type of devices: Left in chair (Pt left supervised in gym at end of sessions)  Restraints  Initially in place: No          Patient Education: OT POC, AE and modified techniques for ADLs, safety and sequencing with functional transfers  Patient Goals   Patient goals :  \"To be able to put my pants/socks on better\"  Learner:patient  Method: explanation       Outcome: acknowledged understanding of Plan  Plan  Times per week: 5-7  Times per day: Twice a day  Current Treatment Recommendations: Balance Training,Functional Mobility Training,Endurance Training,Strengthening,ROM,Safety Education & Training,Patient/Caregiver Education & Training,Equipment Evaluation, Education, & procurement,Self-Care / ADL,Pain Management  Patient Goals   Patient goals :  \"To be able to put my pants/socks on better\"  Short term goals  Time Frame for Short term goals: 1 week   Short term goal 1: Pt will complete LB dressing/bathing/toileting CGA with Good safety with use of AE/DME/modified techniques for increased IND with self care  Short term goal 2: Pt will participate in 30+ minutes functional activity for increased strength/balance/endurance for increased IND in ADL's  Short term goal 3: Pt will complete transfers/functional mobility CGA with Good safety and RW for increased IND in ADL's  Short term goal 4: Pt will verbalize/demonstrate Good understanding of AE/DME/modified techniques for increased IND in self care  Short term goal 5: Pt will complete UB bathing/dressing/grooming with Supervision for increased IND in self care  Long term goals  Time Frame for Long term goals : by discharge   Long term goal 1: Pt will complete toileting/grooming/dressing Mod I and bathing with Supervison with Good safety with use of AE/DME/modified techniques for increased IND with self care  Long term goal 2: Pt will complete functional mobility/transferes during functional activity Mod I with Good safety and RW for increased IND in ADL's  Long term goal 3: Pt will verbalize/demonstrate Good understanding of fall prevention strategies for increased safety/IND in self care  Long term goal 4: Pt will improve L hand strength for self care as evidence by a 3# improvement in dynomometor testing   Long term goal 5: Pt will improve L FMC as evidence by a 20 second improvement on 9 hole peg test for increased IND with self care        04/01/22 4910 04/01/22 1445   OT Individual Minutes   Time In 0905 1300   Time Out 1005 1330   Minutes 60 30     Electronically signed by PATRICIA Neff on 4/1/22 at 3:27 PM EDT

## 2022-04-01 NOTE — PLAN OF CARE
Problem: Skin Integrity:  Goal: Will show no infection signs and symptoms  Description: Will show no infection signs and symptoms  4/1/2022 1415 by Reema Morgan II, LPN  Outcome: Ongoing  4/1/2022 0209 by Jodie Boogie RN  Outcome: Ongoing  Goal: Absence of new skin breakdown  Description: Absence of new skin breakdown  4/1/2022 1415 by Reema Morgan II, LPN  Outcome: Ongoing  4/1/2022 0209 by Jodie Boogie RN  Outcome: Ongoing

## 2022-04-02 LAB
ANION GAP SERPL CALCULATED.3IONS-SCNC: 12 MMOL/L (ref 9–17)
BUN BLDV-MCNC: 62 MG/DL (ref 8–23)
CALCIUM SERPL-MCNC: 9 MG/DL (ref 8.6–10.4)
CHLORIDE BLD-SCNC: 100 MMOL/L (ref 98–107)
CO2: 28 MMOL/L (ref 20–31)
CREAT SERPL-MCNC: 1.33 MG/DL (ref 0.5–0.9)
GFR AFRICAN AMERICAN: 48 ML/MIN
GFR NON-AFRICAN AMERICAN: 39 ML/MIN
GFR SERPL CREATININE-BSD FRML MDRD: ABNORMAL ML/MIN/{1.73_M2}
GLUCOSE BLD-MCNC: 118 MG/DL (ref 70–99)
POTASSIUM SERPL-SCNC: 4.1 MMOL/L (ref 3.7–5.3)
SODIUM BLD-SCNC: 140 MMOL/L (ref 135–144)

## 2022-04-02 PROCEDURE — 97116 GAIT TRAINING THERAPY: CPT

## 2022-04-02 PROCEDURE — 97535 SELF CARE MNGMENT TRAINING: CPT

## 2022-04-02 PROCEDURE — 6370000000 HC RX 637 (ALT 250 FOR IP): Performed by: PHYSICAL MEDICINE & REHABILITATION

## 2022-04-02 PROCEDURE — 36415 COLL VENOUS BLD VENIPUNCTURE: CPT

## 2022-04-02 PROCEDURE — 97110 THERAPEUTIC EXERCISES: CPT

## 2022-04-02 PROCEDURE — 1180000000 HC REHAB R&B

## 2022-04-02 PROCEDURE — 6370000000 HC RX 637 (ALT 250 FOR IP): Performed by: INTERNAL MEDICINE

## 2022-04-02 PROCEDURE — 97112 NEUROMUSCULAR REEDUCATION: CPT

## 2022-04-02 PROCEDURE — 2500000003 HC RX 250 WO HCPCS: Performed by: PHYSICAL MEDICINE & REHABILITATION

## 2022-04-02 PROCEDURE — 2700000000 HC OXYGEN THERAPY PER DAY

## 2022-04-02 PROCEDURE — 97530 THERAPEUTIC ACTIVITIES: CPT

## 2022-04-02 PROCEDURE — 99232 SBSQ HOSP IP/OBS MODERATE 35: CPT | Performed by: INTERNAL MEDICINE

## 2022-04-02 PROCEDURE — 80048 BASIC METABOLIC PNL TOTAL CA: CPT

## 2022-04-02 PROCEDURE — 94761 N-INVAS EAR/PLS OXIMETRY MLT: CPT

## 2022-04-02 PROCEDURE — 94640 AIRWAY INHALATION TREATMENT: CPT

## 2022-04-02 RX ADMIN — APIXABAN 5 MG: 5 TABLET, FILM COATED ORAL at 10:08

## 2022-04-02 RX ADMIN — GABAPENTIN 200 MG: 100 CAPSULE ORAL at 19:51

## 2022-04-02 RX ADMIN — PRAMIPEXOLE DIHYDROCHLORIDE 0.5 MG: 0.25 TABLET ORAL at 19:51

## 2022-04-02 RX ADMIN — CARVEDILOL 12.5 MG: 12.5 TABLET, FILM COATED ORAL at 19:52

## 2022-04-02 RX ADMIN — CALCIUM CARBONATE 600 MG (1,500 MG)-VITAMIN D3 400 UNIT TABLET 1 TABLET: at 10:08

## 2022-04-02 RX ADMIN — BUSPIRONE HYDROCHLORIDE 5 MG: 5 TABLET ORAL at 15:57

## 2022-04-02 RX ADMIN — GABAPENTIN 200 MG: 100 CAPSULE ORAL at 10:08

## 2022-04-02 RX ADMIN — CARVEDILOL 12.5 MG: 12.5 TABLET, FILM COATED ORAL at 10:07

## 2022-04-02 RX ADMIN — GUAIFENESIN 100 MG: 200 SOLUTION ORAL at 18:00

## 2022-04-02 RX ADMIN — IPRATROPIUM BROMIDE AND ALBUTEROL SULFATE 1 AMPULE: 2.5; .5 SOLUTION RESPIRATORY (INHALATION) at 20:14

## 2022-04-02 RX ADMIN — ATORVASTATIN CALCIUM 40 MG: 40 TABLET, FILM COATED ORAL at 19:52

## 2022-04-02 RX ADMIN — FERROUS SULFATE TAB 325 MG (65 MG ELEMENTAL FE) 325 MG: 325 (65 FE) TAB at 19:51

## 2022-04-02 RX ADMIN — POLYETHYLENE GLYCOL 3350 17 G: 17 POWDER, FOR SOLUTION ORAL at 10:09

## 2022-04-02 RX ADMIN — BUSPIRONE HYDROCHLORIDE 5 MG: 5 TABLET ORAL at 11:17

## 2022-04-02 RX ADMIN — FERROUS SULFATE TAB 325 MG (65 MG ELEMENTAL FE) 325 MG: 325 (65 FE) TAB at 11:21

## 2022-04-02 RX ADMIN — PREDNISONE 40 MG: 20 TABLET ORAL at 10:08

## 2022-04-02 RX ADMIN — AMLODIPINE BESYLATE 5 MG: 5 TABLET ORAL at 10:08

## 2022-04-02 RX ADMIN — CYANOCOBALAMIN TAB 1000 MCG 1000 MCG: 1000 TAB at 10:08

## 2022-04-02 RX ADMIN — IPRATROPIUM BROMIDE AND ALBUTEROL SULFATE 1 AMPULE: 2.5; .5 SOLUTION RESPIRATORY (INHALATION) at 15:48

## 2022-04-02 RX ADMIN — IPRATROPIUM BROMIDE AND ALBUTEROL SULFATE 1 AMPULE: 2.5; .5 SOLUTION RESPIRATORY (INHALATION) at 08:06

## 2022-04-02 RX ADMIN — FENOFIBRATE 160 MG: 160 TABLET ORAL at 10:08

## 2022-04-02 RX ADMIN — LOSARTAN POTASSIUM 100 MG: 100 TABLET, FILM COATED ORAL at 10:08

## 2022-04-02 RX ADMIN — CITALOPRAM HYDROBROMIDE 20 MG: 20 TABLET ORAL at 10:08

## 2022-04-02 RX ADMIN — IPRATROPIUM BROMIDE AND ALBUTEROL SULFATE 1 AMPULE: 2.5; .5 SOLUTION RESPIRATORY (INHALATION) at 11:47

## 2022-04-02 RX ADMIN — FUROSEMIDE 20 MG: 20 TABLET ORAL at 11:21

## 2022-04-02 RX ADMIN — APIXABAN 5 MG: 5 TABLET, FILM COATED ORAL at 19:51

## 2022-04-02 RX ADMIN — TRAMADOL HYDROCHLORIDE 50 MG: 50 TABLET, COATED ORAL at 19:52

## 2022-04-02 RX ADMIN — AZITHROMYCIN MONOHYDRATE 250 MG: 250 TABLET ORAL at 11:17

## 2022-04-02 RX ADMIN — BUSPIRONE HYDROCHLORIDE 5 MG: 5 TABLET ORAL at 19:54

## 2022-04-02 ASSESSMENT — PAIN SCALES - GENERAL
PAINLEVEL_OUTOF10: 5
PAINLEVEL_OUTOF10: 0
PAINLEVEL_OUTOF10: 5
PAINLEVEL_OUTOF10: 0

## 2022-04-02 ASSESSMENT — PAIN DESCRIPTION - ORIENTATION
ORIENTATION: POSTERIOR
ORIENTATION_2: LEFT

## 2022-04-02 ASSESSMENT — PAIN SCALES - WONG BAKER
WONGBAKER_NUMERICALRESPONSE: 0

## 2022-04-02 ASSESSMENT — PAIN DESCRIPTION - PAIN TYPE
TYPE: CHRONIC PAIN
TYPE_2: ACUTE PAIN

## 2022-04-02 ASSESSMENT — PAIN DESCRIPTION - LOCATION
LOCATION_2: KNEE
LOCATION: NECK

## 2022-04-02 ASSESSMENT — PAIN DESCRIPTION - INTENSITY: RATING_2: 4

## 2022-04-02 NOTE — PROGRESS NOTES
Physical Therapy  [address]  Acute Rehabilitation Physical Therapy Progress Note    Date: 22  Patient Name: Emanuel Loera       Room: 5167/2977-89  MRN: [id number]   Account: [de-identified]   : 1950  (76 y.o.) Gender: female     Referring Practitioner: Gino Melissa MD  Diagnosis: Cervical myelopathy  Past Medical History:  has a past medical history of Allergic rhinitis, cause unspecified, Back pain, Bowel obstruction ([address] C. difficile diarrhea, CAD (coronary artery disease), Cardiac murmur, Cellulitis, Cellulitis, Cerebral artery occlusion with cerebral infarction ([address] COVID-19, Diverticulosis of colon (without mention of hemorrhage), GERD (gastroesophageal reflux disease), GERD (gastroesophageal reflux disease), History of blood transfusion, History of CHF (congestive heart failure), History of MI (myocardial infarction), History of ovarian cyst, History of peritonitis, HTN (hypertension), Hx of blood clots, Hyperlipidemia, Intestinal or peritoneal adhesions with obstruction (postoperative) (postinfection) ([address] Kidney infection, Lateral epicondylitis  of elbow, MDRO (multiple drug resistant organisms) resistance, Muscle strain, Other abnormal glucose, PONV (postoperative nausea and vomiting), Pre-diabetes, Restless legs syndrome (RLS), Snores, 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[address] TIA (transient ischemic attack), Uses walker, Vitamin D deficiency, Wears glasses, and Wellness examination. Past Surgical History:   has a past surgical history that includes Ovary removal (); colectomy (); Appendectomy (); Ovary removal (); Hysterectomy (); Bunionectomy (Left); sinus surgery (); Colonoscopy; other surgical history (2014); cyst removal (Right); Wrist fracture surgery (Left, 2019); Upper gastrointestinal endoscopy (N/A, 2019); Upper gastrointestinal endoscopy (N/A, 2019);  Upper gastrointestinal endoscopy (N/A, standing rest break taken during amb)  Ambulation 2  Surface - 2: level tile  Device 2: Rolling Walker  Assistance 2: Contact guard assistance;Minimal assistance  Quality of Gait 2: same as above (again slight drifting to her L)  Gait Deviations: Slow Alicia;Decreased step length;Decreased step height  Distance: 40ft x 1, 10ft x 1  Comments: short distances within gym. Pt did experience one brief episode of LOB from catching her L toe w/amb and required Min A to maintain bal/safety     Stairs/Curb  Stairs?: Yes  Stairs  # Steps : 10  Stairs Height:  (4\"/6\")  Rails: Bilateral  Device: No Device  Assistance: Contact guard assistance  Comment: Pt able to ascend/decend steps with step to pattern. VC's for correct sequencing on first pass, pt able to complete correctly  without vc's second time over. BALANCE Posture: Fair  Sitting - Static: Good  Standing - Static: Fair;+  Standing - Dynamic: Fair  Comments: Standing with RW. EXERCISES    Other exercises?: Yes  Other exercises 1: step up with LLE on 4\" step F/L x 10 working on knee flexion/WBing (CGA with exercise)  Other exercises 2: Seated Ex: BLE x20 reps with #2 and green tband, hip adduction squeezes with pillow  Other exercises 3: STS x 5 from EO no UE support working on Cedric Kamar- required min A (blocked L foot to prevent LLE from kicking out with sitting)  Other exercises 4: NuStep L4 x10 minutes, pt experiences SOB with activity and required several short rest periods. SPO2 94% and greater on 2L with activity  Other exercises 5: Bed mobility sup<>sit attempted to pts R and L. Pt does better to her R. Reports having HR at home to use. Other exercises 6: (B) LE standing ex x 10           Activity Tolerance: Patient Tolerated treatment well,Patient limited by pain  PT Equipment Recommendations  Other: Pt has lift chair, rolling walker, and a Rollator at home.     Current Treatment Recommendations: Strengthening,Balance Training,Functional Mobility outside [facility] [address]. Long term goal 5: Pt able to propel w/c on level surface, distance of 150 ft, supervsion level. Long term goal 6: Improve 2MWT distance to atleast 120 ft to improve overall fucntion. Long term goal 7: Pt to improve Tinetti balance score to atlest 20/28 to reduce fall risk.    Long term goal 8: Pt donny to perform cub step with B UE support and/or donny to goup and down 4 to 5 steps with 2 rails at min A        04/02/22 1010 04/02/22 1500   PT Individual Minutes   Time In 1008 1456   Time Out 1110 1532   Minutes 62 36       Electronically signed by Wilton Evans PTA on 4/2/22 at 4:20 PM EDT

## 2022-04-02 NOTE — PROGRESS NOTES
[address]    ACUTE REHABILITATION OCCUPATIONAL THERAPY  DAILY NOTE    Date: 22  Patient Name: Tanmay Jerome      Room: 0517/8496-98    MRN: [id number]   : 1950  (71 y.o.)  Gender: female   Referring Practitioner: Lea Kaba MD  Diagnosis: Cervical myelopathy  Additional Pertinent Hx: 71 y.o.  female, with a history of anemia, spondylolisthesis, chronic DVT, chronic diastolic heart failure, CVA, major depressive disorder, s/p cervical spine fusion, stenosis of cervical spine with myelopathy, and DM type II, who presents with leg pain, shortness of breath, fall, and neck pain. According to patient and her , patient has had multiple falls recently including a fall Monday and evening and again on Tuesday. Patient reports that today she felt extremely weak upon waking. Restrictions  Restrictions/Precautions: General Precautions,Fall Risk  Implants present? : Metal implants  Other position/activity restrictions: up as tolerated; O2 3L/m via NC  Required Braces or Orthoses?: No  Equipment Used: Other (RW)    Subjective  Subjective: \" I tried to use that purewick thing. I don't like it\"   Comments: Pt pleasent and agreeable for OT treatment. Restrictions/Precautions: General Precautions; Fall Risk  Overall Orientation Status: Within Functional Limits          Objective  Cognition  Overall Cognitive Status: WFL  Perception  Overall Perceptual Status: WFL  Balance  Sitting Balance: Supervision  Standing Balance: Contact guard assistance  Bed mobility  Rolling to Left: Minimal assistance  Rolling to Right: Minimal assistance  Supine to Sit: Minimal assistance  Sit to Supine:  Moderate assistance  Scooting: Minimal assistance  Transfers  Sit to stand: Minimal assistance  Stand to sit: Contact guard assistance  Standing Balance  Time: 1 min x3   Activity: ADL activities   Comment: 1-2 UE support  Functional Mobility  Functional - Mobility Device: Wheelchair  Activity: To/from bathroom  Assist Level: Minimal assistance  Functional Mobility Comments: Propeling self with BUEs   Toilet Transfers  Toilet - Technique: Stand pivot  Equipment Used: Grab bars  Toilet Transfer: Minimal assistance  Toilet Transfers Comments: VC's for hand placement/technique   Fine Motor: Pt completing activity placing small shapes on to corosponding placement. Type of ROM/Therapeutic Exercise  Type of ROM/Therapeutic Exercise: Free weights  Comment: BUE exercises 1# free weight 20 reps. No reports of pain noted this date. Completed to increase strength and endurnace for ADL and IADL activites   Exercises  Scapular Protraction: x  Scapular Retraction: x  Shoulder Flexion: x  Shoulder Extension: x  Horizontal ABduction: x  Horizontal ADduction: x  Elbow Flexion: x  Elbow Extension: x  Supination: x  Pronation: x  Grasp/Release: Pt engaged in BUE hand strneghting with hand grippers ( 1.5, 3.0, 5.0#) 15 reps. Completed to increase strength and endurance of B hands. ADL  Equipment Provided: Reacher;Long-handled sponge  Feeding: Setup; Increased time to complete  Grooming: Setup (seated at sink for oral care and hair care )  UE Bathing: Setup  LE Bathing: Minimal assistance (assist washing buttocks )  UE Dressing: Stand by assistance;Verbal cueing; Increased time to complete (cues for putting on correctly )  LE Dressing: Minimal assistance (assist threadinf BLE into pants )  Toileting: Contact guard assistance  Additional Comments: Pt seated atsink to complete ADL activities           Assessment  Performance deficits / Impairments: Decreased functional mobility ; Decreased ADL status; Decreased strength;Decreased endurance;Decreased sensation;Decreased balance;Decreased high-level IADLs;Decreased fine motor control;Decreased coordination  Prognosis: Good  Discharge Recommendations: Patient would benefit from continued therapy after discharge  Activity Tolerance: Patient Tolerated treatment well             Patient Education:  Patient Goals   Patient goals : \"To be able to put my pants/socks on better\"  Learner:patient  Method: demonstration and explanation       Outcome: acknowledged understanding         Plan  Plan  Times per week: 5-7  Times per day: Twice a day  Current Treatment Recommendations: Balance Training,Functional Mobility Training,Endurance Training,Strengthening,ROM,Safety Education & Training,Patient/Caregiver Education & Training,Equipment Evaluation, Education, & procurement,Self-Care / ADL,Pain Management  Patient Goals   Patient goals :  \"To be able to put my pants/socks on better\"  Short term goals  Time Frame for Short term goals: 1 week   Short term goal 1: Pt will complete LB dressing/bathing/toileting CGA with Good safety with use of AE/DME/modified techniques for increased IND with self care  Short term goal 2: Pt will participate in 30+ minutes functional activity for increased strength/balance/endurance for increased IND in ADL's  Short term goal 3: Pt will complete transfers/functional mobility CGA with Good safety and RW for increased IND in ADL's  Short term goal 4: Pt will verbalize/demonstrate Good understanding of AE/DME/modified techniques for increased IND in self care  Short term goal 5: Pt will complete UB bathing/dressing/grooming with Supervision for increased IND in self care  Long term goals  Time Frame for Long term goals : by discharge   Long term goal 1: Pt will complete toileting/grooming/dressing Mod I and bathing with Supervison with Good safety with use of AE/DME/modified techniques for increased IND with self care  Long term goal 2: Pt will complete functional mobility/transferes during functional activity Mod I with Good safety and RW for increased IND in ADL's  Long term goal 3: Pt will verbalize/demonstrate Good understanding of fall prevention strategies for increased safety/IND in self care  Long term goal 4: Pt will improve L hand strength for self care as evidence by a 3# improvement in dynomometor testing   Long term goal 5: Pt will improve L [address] as evidence by a 20 second improvement on 9 hole peg test for increased IND with self care        04/01/22 1445 04/02/22 0901   OT Individual Minutes   Time In 1300 0901   Time Out 1337 1000   Minutes 37 59     Electronically signed by SHWETA Lin on 4/2/22 at 3:53 PM EDT

## 2022-04-02 NOTE — PLAN OF CARE
Problem: Skin Integrity:  Goal: Absence of new skin breakdown  Description: Absence of new skin breakdown  Outcome: Ongoing     Problem: Falls - Risk of:  Goal: Will remain free from falls  Description: Will remain free from falls  Outcome: Ongoing     Problem: Falls - Risk of:  Goal: Absence of physical injury  Description: Absence of physical injury  Outcome: Ongoing     Problem: Pain:  Goal: Control of acute pain  Description: Control of acute pain  Outcome: Ongoing     Problem: Musculor/Skeletal Functional Status  Goal: Absence of falls  Outcome: Ongoing

## 2022-04-02 NOTE — PROGRESS NOTES
Physical Medicine & Rehabilitation  Progress Note    4/2/2022 11:36 AM     CC: Ambulatory and ADL dysfunction due to neuropathy with ataxia. B/B ok , last BM this AM.    Subjective:   O2 3.5 L, some cough. ROS:  Denies fevers, chills, sweats. No chest pain, palpitations, lightheadedness. Positive for coughing, denies wheezing or shortness of breath. Denies abdominal pain, nausea, diarrhea or constipation. No new areas of joint pain. Denies new areas of numbness or weakness. Denies new anxiety or depression issues. No new skin problems. Rehabilitation:   PT:  Restrictions/Precautions: General Precautions,Fall Risk  Implants present? : Metal implants  Other position/activity restrictions: up as tolerated; O2 3L/m via NC   Transfers  Sit to Stand: Contact guard assistance,Minimal Assistance (CGA-Marilyn from chair)  Stand to sit: Contact guard assistance,Minimal Assistance (CGA-Marilyn from chair)  Bed to Chair: Maximum assistance ( MaxA for bed to chair once today, pt started laughing, had pain, and lost her strength)  Stand Pivot Transfers: Contact guard assistance,Minimal Assistance (CGA-Marilyn STS from chair)  Comment: RW used for transfers  Ambulation 1  Surface: level tile  Device: Rolling Walker  Other Apparatus: Wheelchair follow  Assistance: Contact guard assistance  Quality of Gait: slow tawanda with step to pattern, increase tone in LLE causing decrease knee flexion and flat foot LE at contact with amb, decreased stance on L LE; downward gaze and cervical flexion -cues to correct all  Gait Deviations: Slow Tawanda,Decreased step length,Decreased step height  Distance: 151ft  Comments: SPO2 94-95% during and post amb on 2L.  (one standing rest break taken during amb)          OT:      Cognition  Overall Cognitive Status: WFL  Perception  Overall Perceptual Status: WFL  Balance  Standing Balance: Contact guard assistance  Transfers  Stand Pivot Transfers: Contact guard assistance (VC for seuqencing)  Sit to stand: Minimal assistance  Stand to sit: Contact guard assistance  Transfer Comments: 1-2 UE support; VC's for technique; Dycem placed under L foot and shoes on pt to prevent L foot slipping ; increased time to complete  Standing Balance  Time: <1 min X4  Activity: functional transfers, LB dressing  Comment: 1-2 UE support  Functional Mobility  Functional - Mobility Device: Wheelchair  Activity: To/from bathroom  Assist Level: Minimal assistance  Fine Motor: Pt instruction in [facility] and instrinsic hand strengthening for ease and safety with ADLs. Pt completes ADLs boards with increased time. ; PM: pt places small pins in slots with correct orientation. increased time req. Next engaging in game of Trouble for hand strengthening to press pop socket and roll dice. ADL  Grooming: Setup (of items within reach at sink)  UE Bathing: Setup (of items withing reach at the sink)  LE Bathing: None (pt req no need at this time. )  UE Dressing: Stand by assistance;Verbal cueing  LE Dressing: Minimal assistance (CGA using reacher for threading and pulling over hips, TA TE)  Additional Comments: QUINONES facilitated pt in ADLs at the sink this AM. During LE dressing pt req intermiitent CGA for proper use of AE (reacher) to increase ease and independence in dressing task.     Assessment  Performance deficits / Impairments: Decreased functional mobility ; Decreased ADL status; Decreased strength;Decreased endurance;Decreased sensation;Decreased balance;Decreased high-level IADLs;Decreased fine motor control;Decreased coordination  Prognosis: Good  Discharge Recommendations: Patient would benefit from continued therapy after discharge  Activity Tolerance: Patient Tolerated treatment well  Safety Devices in place: Yes  Type of devices: Left in chair (Pt left supervised in gym at end of sessions)  Restraints  Initially in place: No     Patient Education: OT POC, AE and modified techniques for ADLs, safety and sequencing with functional transfers  Patient Goals   Patient goals : \"To be able to put my pants/socks on better\"  Learner:patient  Method: explanation       Outcome: acknowledged understanding of       Plan  Plan  Times per week: 5-7  Times per day: Twice a day  Current Treatment Recommendations: Balance Training,Functional Mobility Training,Endurance Training,Strengthening,ROM,Safety Education & Training,Patient/Caregiver Education & Training,Equipment Evaluation, Education, & procurement,Self-Care / [employer] [facility] Management  Patient Goals   Patient goals :  \"To be able to put my pants/socks on better\"  Short term goals  Time Frame for Short term goals: 1 week   Short term goal 1: Pt will complete LB dressing/bathing/toileting CGA with Good safety with use of AE/DME/modified techniques for increased IND with self care  Short term goal 2: Pt will participate in 30+ minutes functional activity for increased strength/balance/endurance for increased IND in ADL's  Short term goal 3: Pt will complete transfers/functional mobility CGA with Good safety and RW for increased IND in ADL's  Short term goal 4: Pt will verbalize/demonstrate Good understanding of AE/DME/modified techniques for increased IND in self care  Short term goal 5: Pt will complete UB bathing/dressing/grooming with Supervision for increased IND in self care  Long term goals  Time Frame for Long term goals : by discharge   Long term goal 1: Pt will complete toileting/grooming/dressing Mod I and bathing with Supervison with Good safety with use of AE/DME/modified techniques for increased IND with self care  Long term goal 2: Pt will complete functional mobility/transferes during functional activity Mod I with Good safety and RW for increased IND in ADL's  Long term goal 3: Pt will verbalize/demonstrate Good understanding of fall prevention strategies for increased safety/IND in self care  Long term goal 4: Pt will improve L hand strength for self care as evidence by a 3# improvement in dynomometor testing   Long term goal 5: Pt will improve L FMC as evidence by a 20 second improvement on 9 hole peg test for increased IND with self care       22 1444 22 1445   OT Individual Minutes   Time In 0905 1300   Time Out 1005 1330   Minutes 60 30          Objective:  BP (!) 124/58   Pulse 62   Temp 98.2 °F (36.8 °C) (Oral)   Resp 18   Ht 5' 3\" (1.6 m)   Wt 180 lb 6.4 oz (81.8 kg)   SpO2 96%   BMI 31.96 kg/m²  I Body mass index is 31.96 kg/m². I   Wt Readings from Last 1 Encounters:   22 180 lb 6.4 oz (81.8 kg)      Temp (24hrs), Av.1 °F (36.7 °C), Min:97.9 °F (36.6 °C), Max:98.2 °F (36.8 °C)         GEN: well developed, well nourished, no acute distress  HEENT: Normocephalic atraumatic, EOMI, mucous membranes pink and moist  CV: RRR, no murmurs, rubs or gallops  PULM:  Respirations WNL and unlabored, palpation sternal area reproduces sternal discomfort-no change  ABD: soft, NT, ND, +BS and equal  NEURO: A&O x3. Sensation intact to light touch. MSK: At least antigravity to 4 -/5 strength  EXTREMITIES: No calf tenderness to palpation bilaterally. No edema BLEs  SKIN: warm dry and intact with good turgor  PSYCH: appropriately interactive. Affect WNL.           Medications   Scheduled Meds:   ipratropium-albuterol  1 ampule Inhalation 4x daily    losartan  100 mg Oral Daily    amLODIPine  5 mg Oral Daily    gabapentin  200 mg Oral BID    lidocaine  1 patch TransDERmal Daily    busPIRone  5 mg Oral TID    apixaban  5 mg Oral BID    atorvastatin  40 mg Oral Nightly    calcium carbonate w/vitamin D  1 tablet Oral Daily    carvedilol  12.5 mg Oral BID    citalopram  20 mg Oral Daily    fenofibrate  160 mg Oral Daily    ferrous sulfate  325 mg Oral BID    furosemide  20 mg Oral BID    pramipexole  0.5 mg Oral Nightly    cyanocobalamin  1,000 mcg Oral Daily    polyethylene glycol  17 g Oral Daily     Continuous Infusions:  PRN Meds:.albuterol, guaiFENesin, traMADol, melatonin, magnesium hydroxide, docusate sodium, acetaminophen, senna, bisacodyl     Diagnostics:     CBC:   Recent Labs     03/31/22  0645   WBC 5.8   RBC 3.37*   HGB 10.7*   HCT 31.5*   MCV 93.6   RDW 13.7        BMP:   Recent Labs     03/31/22  0645 04/02/22  0808    140   K 4.6 4.1    100   CO2 26 28   BUN 64* 62*   CREATININE 1.36* 1.33*     BNP: No results for input(s): BNP in the last 72 hours. PT/INR: No results for input(s): PROTIME, INR in the last 72 hours. APTT: No results for input(s): APTT in the last 72 hours. CARDIAC ENZYMES: No results for input(s): CKMB, CKMBINDEX, TROPONINT in the last 72 hours. Invalid input(s): CKTOTAL;3  FASTING LIPID PANEL:  Lab Results   Component Value Date    CHOL 103 05/20/2019    HDL 74 03/12/2021    TRIG 66 05/20/2019     LIVER PROFILE: No results for input(s): AST, ALT, ALB, BILIDIR, BILITOT, ALKPHOS in the last 72 hours. I/O (24Hr): Intake/Output Summary (Last 24 hours) at 4/2/2022 1136  Last data filed at 4/1/2022 1930  Gross per 24 hour   Intake 360 ml   Output --   Net 360 ml       Glu last 24 hour  No results for input(s): POCGLU in the last 72 hours. No results for input(s): CLARITYU, COLORU, PHUR, SPECGRAV, PROTEINU, RBCUA, BLOODU, BACTERIA, NITRU, WBCUA, LEUKOCYTESUR, YEAST, GLUCOSEU, BILIRUBINUR in the last 72 hours. Chest x-ray 3/23/2022  Old left rib fractures noted.  Examination is otherwise unremarkable.         3/29/22 chest x-ray  Impression:        No acute cardiopulmonary disease           Impression/Plan:    1. Ataxia with L sided weakness:  PT/OT for gait, mobility, strengthening, endurance, ADLs, and self care. On Pramipexole discharge plan 4/6  2. Cervical myelopathy: Hx C3-6 decompression. Has gabapentin and prn Tylenol for pain.   3.  sternal discomfort reproducible -suspect costochondritis, patient unable to have anti-inflammatories-continue Ultram, cardiac [id number] medicine evaluated, suspect bronchitis -  4. Pulmonary-discussed internal medicine today, Dr. Gely Crespo bronchitis, on steroids and Zithromax, will evaluate today, reviewed  increased O2 needs on Proventil nebulizer, DuoNeb and Robitussin  5. Chronic diastolic heart failure/HTN/CAD: on amlodipine, lipitor, carvedilol, fenofibrate, , furosemide-Norvasc decreased, discontinued and started on Cozaar by internal medicine, monitor, chest x-ray negative 3/29  6. Major depressive disorder: on celexa. -addition of BuSpar by internal medicine, consider psych evaluation if patient agreeable-patient does not want psych evaluation at this time, denies suicidal /homicidal ideation continue to monitor, patient notes will notify if would like eval  7. Chronic DVT: on eliquis - monitor with history of falls  8. Anemia: Hb near normal. On ferrous sulfate. Monitor ,trending down 12.0-11 0.4-10.2-10.7 check stool pending  9. Lower extremity cellulitis: completed Keflex   10. BIN:. Monitoring BMP,  on Lasix lisinopril-creatinine trending down 4/1 1.36, - 4/2 1.33 , ? decrease Lasix/push fluids , concern of CHF, internal medicine follow-up  11. Restless left leg -Mirapex  12. Insomnia: has melatonin prn   13. Pain-Lidoderm patch to right shoulder Neurontin  14. Bowel Management: Miralax daily, senokot prn, dulcolax prn. 15. DVT Prophylaxis:  SCD's while in bed, PRAVEEN's  and Eliquis  16.  Internal medicine for medical management        Uri Rodríguez MD

## 2022-04-02 NOTE — PLAN OF CARE
Problem: Skin Integrity:  Goal: Will show no infection signs and symptoms  Description: Will show no infection signs and symptoms  4/2/2022 0208 by Zainab Johnson RN  Outcome: Ongoing  4/1/2022 1415 by Jaun Shipman II, LPN  Outcome: Ongoing  Goal: Absence of new skin breakdown  Description: Absence of new skin breakdown  4/2/2022 0208 by Zainab Johnson RN  Outcome: Ongoing  4/1/2022 1415 by Jaun Shipman II, LPN  Outcome: Ongoing     Problem: Falls - Risk of:  Goal: Will remain free from falls  Description: Will remain free from falls  4/2/2022 0208 by Zainab Johnson RN  Outcome: Ongoing  4/1/2022 1415 by Jaun Shipman II, LPN  Outcome: Ongoing  Goal: Absence of physical injury  Description: Absence of physical injury  4/2/2022 0208 by Zainab Johnson RN  Outcome: Ongoing  4/1/2022 1415 by Jaun Shipman II, LPN  Outcome: Ongoing     Problem: Pain:  Goal: Pain level will decrease  Description: Pain level will decrease  4/2/2022 0208 by Zainab Johnson RN  Outcome: Ongoing  4/1/2022 1415 by Jaun Shipman II, LPN  Outcome: Ongoing  Goal: Control of acute pain  Description: Control of acute pain  4/2/2022 0208 by Zainab Johnson RN  Outcome: Ongoing  4/1/2022 1415 by Jaun Shipman II, LPN  Outcome: Ongoing  Goal: Control of chronic pain  Description: Control of chronic pain  4/2/2022 0208 by Zainab Johnson RN  Outcome: Ongoing  4/1/2022 1415 by Jaun Shipman II, LPN  Outcome: Ongoing     Problem: Mobility - Impaired:  Goal: Mobility will improve  Description: Mobility will improve  4/2/2022 0208 by Zainab Johnson RN  Outcome: Ongoing  4/1/2022 1415 by Jaun Shipman II, LPN  Outcome: Ongoing     Problem: Musculor/Skeletal Functional Status  Goal: Highest potential functional level  4/2/2022 0208 by Zainab Johnson RN  Outcome: Ongoing  4/1/2022 1415 by Jaun Shipman II, LPN  Outcome: Ongoing  Goal: Absence of falls  4/2/2022 0208 by Zainab Johnson RN  Outcome: Ongoing  4/1/2022 1415 by Jaun Shipman II, LPN  Outcome: Ongoing     Problem: Musculor/Skeletal Functional Status  Goal: Highest potential functional level  4/2/2022 0208 by Arlette Arteaga RN  Outcome: Ongoing  4/1/2022 1415 by Juanita Rosales II, LPN  Outcome: Ongoing  Goal: Absence of falls  4/2/2022 0208 by Arlette Arteaga RN  Outcome: Ongoing  4/1/2022 1415 by Juanita Rosales II, LPN  Outcome: Ongoing     Problem: Nutrition  Goal: Optimal nutrition therapy  4/2/2022 0208 by Arlette Arteaga RN  Outcome: Ongoing  4/1/2022 1420 by Laurent Contreras RD LD  Outcome: Ongoing  4/1/2022 1415 by Maryjane Villalpando LPN  Outcome: Ongoing

## 2022-04-02 NOTE — PROGRESS NOTES
[place] [address] [facility]    CONSULTATION / HISTORY AND PHYSICAL EXAMINATION            Date:   4/1/2022  Patient name:  Amanda Elizabeth  Date of admission:  3/23/2022  4:52 PM  MRN:   [id number]  Account:  [de-identified]  YOB: 1950  PCP:    Kathy Quinn MD  Room:   [address] [place] [address]  Code Status:    Full Code    Physician Requesting Consult: Chaparrita Allison MD    Reason for Consult:  Medical management    Chief Complaint:     No chief complaint on file.   Debility    History Obtained From:     Patient, EMR, nursing staff    History of Present Illness:     52-year-old female initially presented to the multiple falls over several weeks in the setting of chronic cervical spine stenosis with myelopathy status post cervical fusion follows with neurosurgery  Recently started on Eliquis for acute DVT right leg  Trauma work-up CT brain in this admission unremarkable other than multiple remote and healing rib fractures, patient uses walker at home  Neurology review was obtained for worsening gait instability-MRI thoracic spine did show T2-T3 and T5-T7 moderate neural foraminal narrowing  Also patient was noted to have bilateral leg swelling with some stasis dermatitis, early cellulitis-started on diuresis as well as antibiotics  3/26   Patient feeling better  Feels that anxiety is better after starting buspar   Working with PT  3/28 ]  Patient complaining of cough, wheezing  She told the nurse that, cough is going on since she started lisinopril  Had wheezing, which improved with nebulization  3/29   Patient complaining of cough, shortness of breath, wheezing  Chest x-ray seen,  Had EKG, troponins which are flat  Past Medical History:     Past Medical History:   Diagnosis Date    Allergic rhinitis, cause unspecified     Back pain     lumbar    Bowel obstruction (HCC)     history of due to scar tissue, resolved non-surgically    C. difficile diarrhea     CAD (coronary artery disease)     no stent needed per pt.  Dr. Judge Yoder did cath at [facility]    Cardiac murmur     Cellulitis     left leg    Cellulitis 2017 August    leg left leg/bug bite    Cerebral artery occlusion with cerebral infarction [facility])     TIA 2014    COVID-19     ONE YR AGO IN 4/25/2020 fever and cough    Diverticulosis of colon (without mention of hemorrhage)     GERD (gastroesophageal reflux disease)     GERD (gastroesophageal reflux disease)     on rx    History of blood transfusion     approx 2020        History of CHF (congestive heart failure)     History of MI (myocardial infarction) 2005    thought due to a blood clot    History of ovarian cyst 1970    had oopherectomy holly    History of peritonitis 1968    due to ruptured appendix age 13    HTN (hypertension)     Hx of blood clots     right leg    Hyperlipidemia     Intestinal or peritoneal adhesions with obstruction (postoperative) (postinfection) ([address]     Kidney infection     renal failure/sepsis/spider bite    Lateral epicondylitis  of elbow     MDRO (multiple drug resistant organisms) resistance     c diff    Muscle strain     right posterior shoulder    Other abnormal glucose     PONV (postoperative nausea and vomiting)     dry heaves    Pre-diabetes     Restless legs syndrome (RLS)     Snores     no cpap    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)     TIA (transient ischemic attack) 2014    Uses walker     Vitamin D deficiency     Wears glasses     Wellness examination     last seen 2 weeks ago        Past Surgical History:     Past Surgical History:   Procedure Laterality Date    ABDOMEN SURGERY  1976    benign tumor removed near remaining ovary, 1.5 pounds    APPENDECTOMY  1968    appendix ruptured, developed peritonitis    BACK SURGERY      BUNIONECTOMY Left     along with calcium deposits removed   [address]  2005    negative    CERVICAL FUSION  05/21/2021 POSTERIOR C3-6 LAMINECTOMY, PARTIAL C7 LAMINECTOMY, FUSION C3-C6, SILVERCORD    CERVICAL FUSION N/A 5/21/2021    POSTERIOR C3-6 LAMINECTOMY, PARTIAL C7 LAMINECTOMY, FUSION C3-C6, SILVERCORD performed by Lord Andre DO at [address]    12 INCHES REMOVED D/T OBSTRUCTION    COLONOSCOPY      CYST REMOVAL Right     right facial    HYSTERECTOMY  1973    taken as a result of recurring cysts    LUMBAR FUSION N/A 02/10/2020    LUMBAR L4-5 POSTERIOR  DECOMPRESSION INSTRUMENTATION FUSION WCEMENT AUGMENTATION/ performed by Jenni Melendez MD at [facility] N/A 06/17/2020    L5-S1 PLIF L4-L5 REVISION performed by Jenni Melendez MD at [address]  08/14/2014    FESS    OVARY REMOVAL  1970    UNILATERAL due to cyst    OVARY REMOVAL  1971    partial, due to cyst    SINUS SURGERY  2004    UPPER GASTROINTESTINAL ENDOSCOPY N/A 05/31/2019    EGD ESOPHAGOGASTRODUODENOSCOPY performed by Juanis Bueno MD at [address] N/A 08/05/2019    EGD BIOPSY performed by Donna Steele MD at [address] N/A 08/23/2019    EGD BIOPSY performed by Juanis Bueno MD at [address] [facility] 03/05/2019    WRIST OPEN REDUCTION INTERNAL FIXATION performed by Tian Calhoun MD at [address]        Medications Prior to Admission:     Prior to Admission medications    Medication Sig Start Date End Date Taking?  Authorizing Provider   amLODIPine (NORVASC) 10 MG tablet Take 1 tablet by mouth daily 3/10/22   Braden Charles MD   furosemide (LASIX) 40 MG tablet Take 1 tablet by mouth 2 times daily 3/1/22   Irineo Body, APRN - CNP   carvedilol (COREG) 12.5 MG tablet Take 1 tablet by mouth 2 times daily 2/23/22   Irineo Body, APRN - CNP   apixaban (ELIQUIS) 5 MG TABS tablet Please take 2 tablets by mouth for the first week then take 1 tablet by mouth BID for acute DVT  Patient taking differently: Take 5 mg by mouth 2 times daily take 1 tablet by mouth BID for acute DVT 2/17/22   MAIK Mandel CNP   atorvastatin (LIPITOR) 80 MG tablet Take 0.5 tablets by mouth nightly 2/1/22   AMIK Mandel CNP   lisinopril (PRINIVIL;ZESTRIL) 30 MG tablet Take 1 tablet by mouth daily 1/21/22   Dai Etienne MD   fenofibrate micronized (LOFIBRA) 134 MG capsule Take 1 capsule by mouth every morning (before breakfast) 1/13/22   Dai Etienne MD   pramipexole (MIRAPEX) 0.5 MG tablet Take 1 tablet by mouth nightly 1/6/22   Dai Etienne MD   citalopram (CELEXA) 20 MG tablet Take 1 tablet by mouth daily 1/3/22   Luis Zamudio MD   nitroGLYCERIN (NITROSTAT) 0.4 MG SL tablet Place 1 tablet under the tongue every 5 minutes as needed for Chest pain 9/1/21   Dai Etienne MD   docusate sodium (COLACE) 100 MG capsule Take 1 capsule by mouth 2 times daily as needed for Constipation 5/30/21   Matt Marley,    diphenhydrAMINE HCl (BENADRYL ALLERGY PO) Take 1 capsule by mouth daily Takes q HS    Historical Provider, MD   cyanocobalamin ([employer] VITAMIN B12) 1000 MCG tablet Take 1 tablet by mouth daily 12/3/20   Dai Etienne MD   ferrous sulfate (IRON 325) 325 (65 Fe) MG tablet Take 1 tablet by mouth 2 times daily 7/2/20   Rik Sandoval MD   VITAMIN D, ERGOCALCIFEROL, PO Take by mouth    Historical Provider, MD   Lancets MISC 1 each by Does not apply route daily 10/10/19   Danish Tiwari MD   blood glucose monitor strips Test 2 times a day & as needed for symptoms of irregular blood glucose. 10/10/19   Danish Tiwari MD   Calcium Carbonate-Vitamin D (CALCIUM 500/D) 500-125 MG-UNIT TABS Take 1 tablet by mouth daily     Historical Provider, MD        Allergies:     Bactrim [sulfamethoxazole-trimethoprim], Codeine, and Seasonal    Social History:     Tobacco:    reports that she quit smoking about 4 years ago. Her smoking use included cigarettes.  She started smoking about 26 years ago. She has a 10.00 pack-year smoking history. She has never used smokeless tobacco.  Alcohol:      reports no history of alcohol use. Drug Use:  reports no history of drug use. Family History:     Family History   Problem Relation Age of Onset    Stroke Mother     Diabetes Mother     Heart Disease Mother     High Blood Pressure Mother     Heart Disease Father     Heart Disease Brother     High Blood Pressure Brother     Heart Disease Maternal Grandmother     High Blood Pressure Sister        Review of Systems:     Positive and Negative as described in HPI. CONSTITUTIONAL:  negative for fevers, chills, sweats, fatigue, weight loss  HEENT:  negative for vision, hearing changes, runny nose, throat pain  RESPIRATORY: Positive for cough, shortness of breath, wheezing  CARDIOVASCULAR: Positive for chest, worse with cough. GASTROINTESTINAL:  negative for nausea, vomiting, diarrhea, constipation, change in bowel habits, abdominal pain   GENITOURINARY:  negative for difficulty of urination, burning with urination, frequency   INTEGUMENT:  negative for rash, skin lesions, easy bruising   HEMATOLOGIC/LYMPHATIC:  negative for swelling/edema   ALLERGIC/IMMUNOLOGIC:  negative for urticaria , itching  ENDOCRINE:  negative increase in drinking, increase in urination, hot or cold intolerance  MUSCULOSKELETAL:  negative joint pains, muscle aches, swelling of joints  NEUROLOGICAL:  negative for headaches, dizziness, lightheadedness, numbness, pain, tingling extremities      Physical Exam:     BP (!) 113/43   Pulse 63   Temp 97.9 °F (36.6 °C)   Resp 18   Ht 5' 3\" (1.6 m)   Wt 180 lb 6.4 oz (81.8 kg)   SpO2 96%   BMI 31.96 kg/m²   Temp (24hrs), Av.9 °F (36.6 °C), Min:97.9 °F (36.6 °C), Max:97.9 °F (36.6 °C)    No results for input(s): POCGLU in the last 72 hours.     Intake/Output Summary (Last 24 hours) at 2022  Last data filed at 2022 0546  Gross per 24 hour   Intake --   Output 700 ml Net -700 ml       General Appearance:  alert, well appearing, and in no acute distress  Head:  normocephalic, atraumatic. Eye: no icterus, redness, pupils equal and reactive, extraocular eye movements intact, conjunctiva clear  Ear: normal external ear, no discharge, hearing intact  Nose:  no drainage noted  Mouth: mucous membranes moist  Neck: supple, no carotid bruits, thyroid not palpable  Lungs: Air entry but decreased, better wheezing present  Cardiovascular: normal rate, regular rhythm, no murmur, gallop, rub. Abdomen: Soft, nontender, nondistended, normal bowel sounds, no hepatomegaly or splenomegaly  Neurologic: There are no new focal motor or sensory deficits, normal muscle tone and bulk, no abnormal sensation, normal speech, cranial nerves II through XII grossly intact  Skin: No gross lesions, rashes, bruising or bleeding on exposed skin area  Extremities:  peripheral pulses palpable, no pedal edema or calf pain with palpation  Psych: normal affect    Investigations:      Laboratory Testing:  No results found for this or any previous visit (from the past 24 hour(s)).     Imaging/Diagonstics:  Recent data reviewed    Assessment :      Primary Problem  Cervical myelopathy [facility])    Active Hospital Problems    Diagnosis Date Noted    Cervical myelopathy ([facility] [G95.9] 03/17/2022       Plan:     Multiple falls, gait instability, multiple remote and healing rib fractures leading to chest pain-needs PT OT  Neural foraminal narrowing of thoracic spine-neurology as reviewed-recommend rehab and physical therapy  Hypertension-continue amlodipine, Coreg  Right leg DVT-continue Eliquis  Chronic diastolic CHF-currently compensated-continue Coreg, Lipitor, Lasix, lisinopril  Depression -patient noted to be very tearful today-is already on maximal dose of  Celexa, will add BuSpar    DVT prophylaxis-patient on Eliquis    3/27   But has readings of low blood pressure, asymptomatic  Reducing dose of Norvasc to 5 from 10    3/28   Patient has chronic cough, started since she is put on ACE inhibitor  May have ACE inhibitor induced cough, will discontinue lisinopril, start patient on Cozaar  Started patient given nebulization  Has history of smoking  Chest x-ray seen   3/29   Suspecting symptoms are due to acute bronchitis,  Ordering Z-Han, prednisone  EKG seen, troponins are flat  Chest x-ray seen  We will monitor  4/1  Patient shortness of breath is getting better being on steroids  Oxygen requirement going down   Consultations:     [address] MD  4/1/2022  8:44 PM    Copy sent to Dr. Robbie Stone MD    Please note that this chart was generated using voice recognition Dragon dictation software. Although every effort was made to ensure the accuracy of this automated transcription, some errors in transcription may have occurred.

## 2022-04-03 PROCEDURE — 6370000000 HC RX 637 (ALT 250 FOR IP): Performed by: PHYSICAL MEDICINE & REHABILITATION

## 2022-04-03 PROCEDURE — 1180000000 HC REHAB R&B

## 2022-04-03 PROCEDURE — 6370000000 HC RX 637 (ALT 250 FOR IP): Performed by: INTERNAL MEDICINE

## 2022-04-03 PROCEDURE — 97112 NEUROMUSCULAR REEDUCATION: CPT

## 2022-04-03 PROCEDURE — 97530 THERAPEUTIC ACTIVITIES: CPT

## 2022-04-03 PROCEDURE — 2700000000 HC OXYGEN THERAPY PER DAY

## 2022-04-03 PROCEDURE — 97535 SELF CARE MNGMENT TRAINING: CPT

## 2022-04-03 PROCEDURE — 2500000003 HC RX 250 WO HCPCS: Performed by: PHYSICAL MEDICINE & REHABILITATION

## 2022-04-03 PROCEDURE — 97110 THERAPEUTIC EXERCISES: CPT

## 2022-04-03 PROCEDURE — 94640 AIRWAY INHALATION TREATMENT: CPT

## 2022-04-03 PROCEDURE — 6370000000 HC RX 637 (ALT 250 FOR IP): Performed by: NURSE PRACTITIONER

## 2022-04-03 PROCEDURE — 99232 SBSQ HOSP IP/OBS MODERATE 35: CPT | Performed by: INTERNAL MEDICINE

## 2022-04-03 PROCEDURE — 94761 N-INVAS EAR/PLS OXIMETRY MLT: CPT

## 2022-04-03 PROCEDURE — 97116 GAIT TRAINING THERAPY: CPT

## 2022-04-03 RX ADMIN — CITALOPRAM HYDROBROMIDE 20 MG: 20 TABLET ORAL at 08:07

## 2022-04-03 RX ADMIN — FUROSEMIDE 20 MG: 20 TABLET ORAL at 08:07

## 2022-04-03 RX ADMIN — CALCIUM CARBONATE 600 MG (1,500 MG)-VITAMIN D3 400 UNIT TABLET 1 TABLET: at 08:07

## 2022-04-03 RX ADMIN — TRAMADOL HYDROCHLORIDE 50 MG: 50 TABLET, COATED ORAL at 08:07

## 2022-04-03 RX ADMIN — APIXABAN 5 MG: 5 TABLET, FILM COATED ORAL at 08:07

## 2022-04-03 RX ADMIN — PRAMIPEXOLE DIHYDROCHLORIDE 0.5 MG: 0.25 TABLET ORAL at 21:13

## 2022-04-03 RX ADMIN — FENOFIBRATE 160 MG: 160 TABLET ORAL at 08:07

## 2022-04-03 RX ADMIN — GUAIFENESIN 100 MG: 200 SOLUTION ORAL at 10:05

## 2022-04-03 RX ADMIN — BUSPIRONE HYDROCHLORIDE 5 MG: 5 TABLET ORAL at 08:09

## 2022-04-03 RX ADMIN — FERROUS SULFATE TAB 325 MG (65 MG ELEMENTAL FE) 325 MG: 325 (65 FE) TAB at 08:07

## 2022-04-03 RX ADMIN — AMLODIPINE BESYLATE 5 MG: 5 TABLET ORAL at 08:07

## 2022-04-03 RX ADMIN — APIXABAN 5 MG: 5 TABLET, FILM COATED ORAL at 21:13

## 2022-04-03 RX ADMIN — CARVEDILOL 12.5 MG: 12.5 TABLET, FILM COATED ORAL at 21:13

## 2022-04-03 RX ADMIN — LOSARTAN POTASSIUM 100 MG: 100 TABLET, FILM COATED ORAL at 08:07

## 2022-04-03 RX ADMIN — IPRATROPIUM BROMIDE AND ALBUTEROL SULFATE 1 AMPULE: 2.5; .5 SOLUTION RESPIRATORY (INHALATION) at 20:45

## 2022-04-03 RX ADMIN — IPRATROPIUM BROMIDE AND ALBUTEROL SULFATE 1 AMPULE: 2.5; .5 SOLUTION RESPIRATORY (INHALATION) at 15:43

## 2022-04-03 RX ADMIN — CYANOCOBALAMIN TAB 1000 MCG 1000 MCG: 1000 TAB at 08:07

## 2022-04-03 RX ADMIN — CARVEDILOL 12.5 MG: 12.5 TABLET, FILM COATED ORAL at 08:07

## 2022-04-03 RX ADMIN — IPRATROPIUM BROMIDE AND ALBUTEROL SULFATE 1 AMPULE: 2.5; .5 SOLUTION RESPIRATORY (INHALATION) at 11:42

## 2022-04-03 RX ADMIN — BUSPIRONE HYDROCHLORIDE 5 MG: 5 TABLET ORAL at 15:29

## 2022-04-03 RX ADMIN — FUROSEMIDE 20 MG: 20 TABLET ORAL at 18:24

## 2022-04-03 RX ADMIN — ATORVASTATIN CALCIUM 40 MG: 40 TABLET, FILM COATED ORAL at 21:14

## 2022-04-03 RX ADMIN — BUSPIRONE HYDROCHLORIDE 5 MG: 5 TABLET ORAL at 21:15

## 2022-04-03 RX ADMIN — Medication 6 MG: at 21:15

## 2022-04-03 RX ADMIN — FERROUS SULFATE TAB 325 MG (65 MG ELEMENTAL FE) 325 MG: 325 (65 FE) TAB at 21:13

## 2022-04-03 RX ADMIN — GABAPENTIN 200 MG: 100 CAPSULE ORAL at 21:13

## 2022-04-03 RX ADMIN — GABAPENTIN 200 MG: 100 CAPSULE ORAL at 08:07

## 2022-04-03 ASSESSMENT — PAIN DESCRIPTION - PAIN TYPE: TYPE: CHRONIC PAIN

## 2022-04-03 ASSESSMENT — PAIN DESCRIPTION - ORIENTATION: ORIENTATION: POSTERIOR

## 2022-04-03 ASSESSMENT — PAIN SCALES - GENERAL
PAINLEVEL_OUTOF10: 0
PAINLEVEL_OUTOF10: 5
PAINLEVEL_OUTOF10: 5

## 2022-04-03 ASSESSMENT — PAIN DESCRIPTION - LOCATION: LOCATION: NECK

## 2022-04-03 NOTE — PROGRESS NOTES
[address] [place] [address] [address]   ACUTE REHABILITATION OCCUPATIONAL THERAPY  DAILY NOTE    Date: 4/3/22  Patient Name: Nguyen Skelton      Room: 4940/9310-54    MRN: [id number]   : 1950  (71 y.o.)  Gender: female   Referring Practitioner: Ulysses Spatz, MD  Diagnosis: Cervical myelopathy  Additional Pertinent Hx: 71 y.o.  female, with a history of anemia, spondylolisthesis, chronic DVT, chronic diastolic heart failure, CVA, major depressive disorder, s/p cervical spine fusion, stenosis of cervical spine with myelopathy, and DM type II, who presents with leg pain, shortness of breath, fall, and neck pain. According to patient and her , patient has had multiple falls recently including a fall Monday and evening and again on Tuesday. Patient reports that today she felt extremely weak upon waking. Restrictions  Restrictions/Precautions: General Precautions,Fall Risk  Implants present? : Metal implants (cervical and lumbar surgeries, L wrist ORIF)  Other position/activity restrictions: up as tolerated; O2 3L/m via NC  Required Braces or Orthoses?: No  Equipment Used: Other (RW)    Subjective  Subjective: \"What would we ever do without that\" Pt referring using the reacher throughout the session (gathering items, LB dressing). Patient Currently in Pain: Yes  Pain Level: 5  Pain Location: Neck  Pain Orientation: Posterior  Restrictions/Precautions: General Precautions; Fall Risk        Pain Assessment  Pain Assessment: 0-10  Pain Level: 5  Pain Type: Chronic pain  Pain Location: Neck  Pain Orientation: Posterior    Objective     Balance  Sitting Balance: Supervision  Standing Balance: Contact guard assistance  Bed mobility  Comment: Pt up in chair at beginning and end of session  Transfers  Stand Pivot Transfers: Contact guard assistance  Sit to stand: Minimal assistance  Stand to sit: Contact guard assistance  Transfer Comments: OT blocked L foot to prevent from slipping. Cued for technique as needed and locking brakes. Standing Balance  Time: AM: <1 minute x4  Activity: functional transfers, LB ADLs  Comment: 1-2 UE support  Functional Mobility  Functional - Mobility Device: Wheelchair  Activity: To/from bathroom  Assist Level: Contact guard assistance  Functional Mobility Comments: Propelled self using BUEs and BLEs. Toilet Transfers  Toilet - Technique: Stand pivot  Equipment Used: Grab bars  Toilet Transfer: Contact guard assistance  Toilet Transfers Comments: Cued for hand placement this date. Shower Transfers  Shower - Transfer From: Wheelchair  Shower - Transfer Type: To and From  Shower - Transfer To: Transfer tub bench  Shower - Technique: Stand pivot  Shower Transfers: Contact Guard     Type of ROM/Therapeutic Exercise  Type of ROM/Therapeutic Exercise: Free weights (Red flex bar)  Comment: PM: OT facilitated pt's engagement in BUE exercises to promote strength/endurance for improved occupational performance and safe transfers. 2# weight, 1 set 10-15 reps, with good tolerance and frequent rest breaks. Cued for slowing pace and increased ROM as tolerated. Exercises  Scapular Protraction: X  Scapular Retraction: X  Shoulder ABduction: X  Shoulder ADduction: X  Horizontal ABduction: X  Horizontal ADduction: X  Elbow Flexion: X  Elbow Extension: X  Supination: X  Pronation: X  Wrist Flexion: X  Wrist Extension: X  Grasp/Release: Pt engaged in hand strengthening, using hadn gripper at 2nd setting. OT provided verbal/tactile cueing for technique and slowed pace, good tolerance. ADL  Equipment Provided: Reacher;Long-handled sponge  Feeding: Setup; Increased time to complete  Grooming: Setup (seated at sinkside; brushed/straighten hair, brushed teeth)  UE Bathing: Setup  LE Bathing: Contact guard assistance (Used LHS to wash feet)  UE Dressing: Stand by assistance;Verbal cueing; Increased time to complete  LE Dressing: Minimal assistance (A with PRAVEEN hose; placing underwear through L foot)  Toileting: Contact guard assistance  Additional Comments: OT facilitated pt's engagement in today's session. AM: Bathing tasks completed seated in shower. Pt stood with 1 UE support on grab bar, to complete pericare; required OT assist to block L foot from slipping. Donned underwear/pants using reacher and flexing trunk forward. Pt placed BLEs through same hole, using reacher to correct error. Increased time to complete all ADLs. OT inquired about pt's bathroom setup. Pt reported that she has a small bathroom, unable to fit w/c to complete grooming tasks seated. Assessment  Performance deficits / Impairments: Decreased functional mobility ; Decreased ADL status; Decreased strength;Decreased endurance;Decreased sensation;Decreased balance;Decreased high-level IADLs;Decreased fine motor control;Decreased coordination  Prognosis: Good  Discharge Recommendations: Patient would benefit from continued therapy after discharge  Activity Tolerance: Patient Tolerated treatment well  Safety Devices in place: Yes  Type of devices: All fall risk precautions in place;Call light within reach;Gait belt;Patient at risk for falls; Left in chair          Patient Education: ed pt on OT POC, safe transfers/use of RW, use of reacher for LB dressing  Patient Goals   Patient goals : \"To be able to put my pants/socks on better\"  Learner:patient  Method: explanation       Outcome: needs reinforcement        Plan  Plan  Times per week: 5-7  Times per day: Twice a day  Current Treatment Recommendations: Balance Training,Functional Mobility Training,Endurance Training,Strengthening,ROM,Safety Education & Training,Patient/Caregiver Education & Training,Equipment Evaluation, Education, & procurement,Self-Care / Jelena Kingston Management  Patient Goals   Patient goals :  \"To be able to put my pants/socks on better\"  Short term goals  Time Frame for Short term goals: 1 week   Short term goal 1: Pt will complete LB dressing/bathing/toileting CGA with Good safety with use of AE/DME/modified techniques for increased IND with self care  Short term goal 2: Pt will participate in 30+ minutes functional activity for increased strength/balance/endurance for increased IND in ADL's  Short term goal 3: Pt will complete transfers/functional mobility CGA with Good safety and RW for increased IND in ADL's  Short term goal 4: Pt will verbalize/demonstrate Good understanding of AE/DME/modified techniques for increased IND in self care  Short term goal 5: Pt will complete UB bathing/dressing/grooming with Supervision for increased IND in self care  Long term goals  Time Frame for Long term goals : by discharge   Long term goal 1: Pt will complete toileting/grooming/dressing Mod I and bathing with Supervison with Good safety with use of AE/DME/modified techniques for increased IND with self care  Long term goal 2: Pt will complete functional mobility/transferes during functional activity Mod I with Good safety and RW for increased IND in ADL's  Long term goal 3: Pt will verbalize/demonstrate Good understanding of fall prevention strategies for increased safety/IND in self care  Long term goal 4: Pt will improve L hand strength for self care as evidence by a 3# improvement in dynomometor testing   Long term goal 5: Pt will improve L FMC as evidence by a 20 second improvement on 9 hole peg test for increased IND with self care  Timed Code Treatment Minutes: (P) 22 Minutes    Electronically signed by Coby Guevara OT on 4/3/22 at 1:50 PM EDT       04/03/22 0906 04/03/22 1322   OT Individual Minutes   Time In [address] [place] [address]   Time Out 0915 1327   Minutes 73 22   Time Code Minutes    Timed Code Treatment Minutes 73 Minutes 22 Minutes

## 2022-04-03 NOTE — PROGRESS NOTES
Physical Therapy  [facility] Physical Therapy Progress Note    Date: 4/3/22  Patient Name: Elis Zarate       Room: 1615/8261-02  MRN: [id number]   Account: [de-identified]   : 1950  (76 y.o.) Gender: female     Referring Practitioner: Felicita Peña MD  Diagnosis: Cervical myelopathy  Past Medical History:  has a past medical history of Allergic rhinitis, cause unspecified, Back pain, Bowel obstruction ([address] C. difficile diarrhea, CAD (coronary artery disease), Cardiac murmur, Cellulitis, Cellulitis, Cerebral artery occlusion with cerebral infarction ([address] COVID-19, Diverticulosis of colon (without mention of hemorrhage), GERD (gastroesophageal reflux disease), GERD (gastroesophageal reflux disease), History of blood transfusion, History of CHF (congestive heart failure), History of MI (myocardial infarction), History of ovarian cyst, History of peritonitis, HTN (hypertension), Hx of blood clots, Hyperlipidemia, Intestinal or peritoneal adhesions with obstruction (postoperative) (postinfection) ([address] Kidney infection, Lateral epicondylitis  of elbow, MDRO (multiple drug resistant organisms) resistance, Muscle strain, Other abnormal glucose, PONV (postoperative nausea and vomiting), Pre-diabetes, Restless legs syndrome (RLS), Snores, 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[address] TIA (transient ischemic attack), Uses walker, Vitamin D deficiency, Wears glasses, and Wellness examination. Past Surgical History:   has a past surgical history that includes Ovary removal (); colectomy (); Appendectomy (); Ovary removal (); Hysterectomy (); Bunionectomy (Left); sinus surgery (); Colonoscopy; other surgical history (2014); cyst removal (Right); Wrist fracture surgery (Left, 2019); Upper gastrointestinal endoscopy (N/A, 2019); Upper gastrointestinal endoscopy (N/A, 2019);  Upper gastrointestinal endoscopy (N/A, 08/23/2019); Abdomen surgery (1976); lumbar fusion (N/A, 02/10/2020); Cardiac catheterization; Cardiac catheterization (2005); [place] tooth extraction; lumbar fusion (N/A, 06/17/2020); back surgery; cervical fusion (05/21/2021); and cervical fusion (N/A, 5/21/2021). Additional Pertinent Hx: TIA       Restrictions/Precautions  Restrictions/Precautions: General Precautions; Fall Risk  Required Braces or Orthoses?: No  Implants present? : Metal implants (cervical and lumbar surgeries, L wrist ORIF)  Position Activity Restriction  Other position/activity restrictions: up as tolerated; O2 3L/m via NC       Bed Mobility:   Bed Mobility  Supine to Sit: Stand by assistance (using HR on mat table)  Sit to Supine: Minimal assistance;Stand by assistance (Min A with LLE on mat, SBA with hospital bed)  Comment: Mat with 4 pillows. Pt has increase difficulty with bed mobility on mat table. Reports she has a hospital bed at home. Pt required assist with LLE getting up onto mat, however can complete the tasks with SBA in hospital bed in room with [facility] elevated and using HR. Struggles, and requires extra time, however able to complete. Transfers:  Sit to Stand: Contact guard assistance;Minimal Assistance (w/VC's for technique)  Stand to sit: Contact guard assistance;Minimal Assistance (vc's for technique)                 Ambulation 1  Surface: level tile  Device: Rolling Walker  Assistance: Contact guard assistance  Quality of Gait: slow tawanda with step to pattern, increase tone in LLE causing decrease knee flexion and flat foot LE at contact with amb, decreased stance on L LE; downward gaze and cervical flexion -cues to correct all (slight drift to her L)  Gait Deviations: Slow Tawanda;Decreased step length;Decreased step height  Distance: 146ft  Comments: SPO2 94 pre gait and 97% post gait on 2L.   Ambulation 2  Surface - 2: level tile;ramp  Device 2: Rolling Walker  Assistance 2: Contact guard assistance;Minimal assistance  Quality of Gait 2: demos improved ability to flex L knee with amb when prompted to slow down and take larger steps with RLE    Gait Deviations: Slow Alicia;Decreased step length;Decreased step height;Deviated path (veers left)  Distance: 158ft  Comments: Fatigued quickly with amb on ramp. Slightly off balance at times     Stairs/Curb  Stairs?: Yes  Stairs  # Steps : 10  Stairs Height:  (4\"/6\")  Rails: Bilateral  Device: No Device  Assistance: Contact guard assistance  Comment: Pt able to verbalized and demonstrate correct step to sequencing. CGA. Slight circumduction of LLE with ascending steps. VC's to correct       BALANCE Posture: Fair  Sitting - Static: Good  Sitting - Dynamic: Fair  Standing - Static: Fair;+  Standing - Dynamic: Fair  Comments: Standing with RW. EXERCISES    Other exercises?: Yes  Other exercises 1: Amb at handrail F/R  Other exercises 2: low alicia step over   Other exercises 3: standing RLE ex x 10   Other exercises 4: LLE lift on/off 8\" box steps x 15  Other exercises 5: LLE ball roll F/R under foot x 15  Other exercises 6: Supine (B) LE ex x 10  Other exercises 7: Bridges x 10           Activity Tolerance: Patient Tolerated treatment well,Patient limited by pain  PT Equipment Recommendations  Other: Pt has hospital bed, lift chair, rolling walker, and a Rollator at home. Current Treatment Recommendations: Strengthening,Balance Training,Functional Mobility Training,Transfer Training,Endurance Training,Gait Training,Equipment Evaluation, Education, & procurement,Patient/Caregiver Education & Training,Safety Education & Training,Stair training,Wheelchair Mobility Training    Conditions Requiring Skilled Therapeutic Intervention  Body structures, Functions, Activity limitations: Decreased functional mobility ; Decreased ADL status; Decreased ROM; Decreased strength;Decreased endurance;Decreased balance;Decreased posture; Increased pain  Assessment: Pt presents with frequent falls at home, has generalized weakness, and debility, but L LE weaker thant R LE. Pt with decreased balance, decreased motor planning and weakness contributing to her falls. Pt with recent R LE DVT and R LE isswollen at this time. Pt is high fall risk at this time   Treatment Diagnosis: Impaired functional mobility 2* ataxia  Prognosis: Good  Exam: ROM, MMT, bed mobility, transfers, amb  Clinical Presentation: Pt alert, cooperative, pleasant  Barriers to Learning: none  REQUIRES PT FOLLOW UP: Yes  Discharge Recommendations: Patient would benefit from continued therapy after discharge    Goals  Short term goals  Time Frame for Short term goals: 1 week  Short term goal 1: Pt to demostrate bed mobility CGA/Jennifer. Short term goal 2: Pt to perform transfers CGA. Short term goal 3: Pt to amb 100'-150' with device, CGA. Short term goal 4: Pt to improve BLE strength by 1/2 MMG. Short term goal 5: Pt to improve standing balance to [facility]. Short term goal 6: Pt able to perform 4\" and 6\" steps x3 in //bars or acute stair way, with 2 B UE support, min A  Long term goals  Time Frame for Long term goals : By DC  Long term goal 1: Pt able to perform bed mobility at SBA  Long term goal 2: Pt able to transfer at supervsion level. Long term goal 3:  Pt able to ambulate house hold distances with assistive device mod-I  Long term goal 4:  Pt able to ambulate distance of 150 ft, level surfaces and shorter distance outside terraine at SBA. Long term goal 5: Pt able to propel w/c on level surface, distance of 150 ft, supervsion level. Long term goal 6: Improve 2MWT distance to atleast 120 ft to improve overall fucntion. Long term goal 7: Pt to improve Tinetti balance score to atlest 20/28 to reduce fall risk.    Long term goal 8: Pt donny to perform cub step with B UE support and/or donny to goup and down 4 to 5 steps with 2 rails at min A        04/03/22 1011 04/03/22 1545   PT Individual Minutes   Time In 1004 1500   Time Out [address]       Electronically signed by Asaf Jarrett PTA on 4/3/22 at 4:10 PM EDT

## 2022-04-03 NOTE — PROGRESS NOTES
[facility] Internal Medicine    CONSULTATION / HISTORY AND PHYSICAL EXAMINATION            Date:   4/3/2022  Patient name:  Kendrick Sloan  Date of admission:  3/23/2022  4:52 PM  MRN:   [id number]  Account:  [de-identified]  YOB: 1950  PCP:    Galen Titus MD  Room:   [facility]262-  Code Status:    Full Code    Physician Requesting Consult: Puneet Valerio MD    Reason for Consult:  Medical management    Chief Complaint:     No chief complaint on file.   Debility    History Obtained From:     Patient, EMR, nursing staff    History of Present Illness:     36-year-old female initially presented to the multiple falls over several weeks in the setting of chronic cervical spine stenosis with myelopathy status post cervical fusion follows with neurosurgery  Recently started on Eliquis for acute DVT right leg  Trauma work-up CT brain in this admission unremarkable other than multiple remote and healing rib fractures, patient uses walker at home  Neurology review was obtained for worsening gait instability-MRI thoracic spine did show T2-T3 and T5-T7 moderate neural foraminal narrowing  Also patient was noted to have bilateral leg swelling with some stasis dermatitis, early cellulitis-started on diuresis as well as antibiotics  3/26   Patient feeling better  Feels that anxiety is better after starting buspar   Working with PT  3/28 ]  Patient complaining of cough, wheezing  She told the nurse that, cough is going on since she started lisinopril  Had wheezing, which improved with nebulization  3/29   Patient complaining of cough, shortness of breath, wheezing  Chest x-ray seen,  Had EKG, troponins which are flat  Past Medical History:     Past Medical History:   Diagnosis Date    Allergic rhinitis, cause unspecified     Back pain     lumbar    Bowel obstruction (HCC)     history of due to scar tissue, resolved non-surgically    C. difficile diarrhea     CAD (coronary artery disease)     no stent needed per pt.  Dr. Lolita Clark did cath at [facility] 2005    Cardiac murmur     Cellulitis     left leg    Cellulitis 2017 August    leg left leg/bug bite    Cerebral artery occlusion with cerebral infarction [facility])     TIA 2014    COVID-19     ONE YR AGO IN 4/25/2020 fever and cough    Diverticulosis of colon (without mention of hemorrhage)     GERD (gastroesophageal reflux disease)     GERD (gastroesophageal reflux disease)     on rx    History of blood transfusion     approx 2020        History of CHF (congestive heart failure)     History of MI (myocardial infarction) 2005    thought due to a blood clot    History of ovarian cyst 1970    had oopherectomy holly    History of peritonitis 1968    due to ruptured appendix age 13    HTN (hypertension)     Hx of blood clots     right leg    Hyperlipidemia     Intestinal or peritoneal adhesions with obstruction (postoperative) (postinfection) ([address]     Kidney infection     renal failure/sepsis/spider bite    Lateral epicondylitis  of elbow     MDRO (multiple drug resistant organisms) resistance     c diff    Muscle strain     right posterior shoulder    Other abnormal glucose     PONV (postoperative nausea and vomiting)     dry heaves    Pre-diabetes     Restless legs syndrome (RLS)     Snores     no cpap    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)     TIA (transient ischemic attack) 2014    Uses walker     Vitamin D deficiency     Wears glasses     Wellness examination     last seen 2 weeks ago        Past Surgical History:     Past Surgical History:   Procedure Laterality Date    ABDOMEN SURGERY  1976    benign tumor removed near remaining ovary, 1.5 pounds    APPENDECTOMY  1968    appendix ruptured, developed peritonitis    BACK SURGERY      BUNIONECTOMY Left     along with calcium deposits removed   [address]  2005    negative    CERVICAL FUSION  05/21/2021 POSTERIOR C3-6 LAMINECTOMY, PARTIAL C7 LAMINECTOMY, FUSION C3-C6, SILVERCORD    CERVICAL FUSION N/A 5/21/2021    POSTERIOR C3-6 LAMINECTOMY, PARTIAL C7 LAMINECTOMY, FUSION C3-C6, SILVERCORD performed by Naveen Connolly DO at [address]    12 INCHES REMOVED D/T OBSTRUCTION    COLONOSCOPY      CYST REMOVAL Right     right facial    HYSTERECTOMY  1973    taken as a result of recurring cysts    LUMBAR FUSION N/A 02/10/2020    LUMBAR L4-5 POSTERIOR  DECOMPRESSION INSTRUMENTATION FUSION WCEMENT AUGMENTATION/ performed by Talia Pradhan MD at [facility] N/A 06/17/2020    L5-S1 PLIF L4-L5 REVISION performed by Talia Pradhan MD at [address] [facility]  08/14/2014    FESS    OVARY REMOVAL  1970    UNILATERAL due to cyst    OVARY REMOVAL  1971    partial, due to cyst    SINUS SURGERY  2004    UPPER GASTROINTESTINAL ENDOSCOPY N/A 05/31/2019    EGD ESOPHAGOGASTRODUODENOSCOPY performed by Tamiko Ledbetter MD at [address] N/A 08/05/2019    EGD BIOPSY performed by Travis Villatoro MD at [address] [facility] N/A 08/23/2019    EGD BIOPSY performed by Tamiko Ledbetter MD at [address] [facility] 03/05/2019    WRIST OPEN REDUCTION INTERNAL FIXATION performed by Andres Bryan MD at [address]        Medications Prior to Admission:     Prior to Admission medications    Medication Sig Start Date End Date Taking?  Authorizing Provider   amLODIPine (NORVASC) 10 MG tablet Take 1 tablet by mouth daily 3/10/22   Danii Lopes MD   furosemide (LASIX) 40 MG tablet Take 1 tablet by mouth 2 times daily 3/1/22   MAIK Davis CNP   carvedilol (COREG) 12.5 MG tablet Take 1 tablet by mouth 2 times daily 2/23/22   MAIK Davis - CNP   apixaban (ELIQUIS) 5 MG TABS tablet Please take 2 tablets by mouth for the first week then take 1 tablet by mouth BID for acute DVT  Patient taking differently: Take 5 mg by mouth 2 times daily take 1 tablet by mouth BID for acute DVT 2/17/22   MAIK Landry CNP   atorvastatin (LIPITOR) 80 MG tablet Take 0.5 tablets by mouth nightly 2/1/22   MAIK Landry CNP   lisinopril (PRINIVIL;ZESTRIL) 30 MG tablet Take 1 tablet by mouth daily 1/21/22   Vianney Soto MD   fenofibrate micronized (LOFIBRA) 134 MG capsule Take 1 capsule by mouth every morning (before breakfast) 1/13/22   Vianney Soto MD   pramipexole (MIRAPEX) 0.5 MG tablet Take 1 tablet by mouth nightly 1/6/22   Vianney Soto MD   citalopram (CELEXA) 20 MG tablet Take 1 tablet by mouth daily 1/3/22   Yue Perez MD   nitroGLYCERIN (NITROSTAT) 0.4 MG SL tablet Place 1 tablet under the tongue every 5 minutes as needed for Chest pain 9/1/21   Vianney Soto MD   docusate sodium (COLACE) 100 MG capsule Take 1 capsule by mouth 2 times daily as needed for Constipation 5/30/21   Oscar Marie Marley, DO   diphenhydrAMINE HCl (BENADRYL ALLERGY PO) Take 1 capsule by mouth daily Takes q HS    Historical Provider, MD   cyanocobalamin ([facility] VITAMIN B12) 1000 MCG tablet Take 1 tablet by mouth daily 12/3/20   Vianney Soto MD   ferrous sulfate (IRON 325) 325 (65 Fe) MG tablet Take 1 tablet by mouth 2 times daily 7/2/20   Caprice Chang MD   VITAMIN D, ERGOCALCIFEROL, PO Take by mouth    Historical Provider, MD   Lancets MISC 1 each by Does not apply route daily 10/10/19   Iqra Tellez MD   blood glucose monitor strips Test 2 times a day & as needed for symptoms of irregular blood glucose. 10/10/19   Iqra Tellez MD   Calcium Carbonate-Vitamin D (CALCIUM 500/D) 500-125 MG-UNIT TABS Take 1 tablet by mouth daily     Historical Provider, MD        Allergies:     Bactrim [sulfamethoxazole-trimethoprim], Codeine, and Seasonal    Social History:     Tobacco:    reports that she quit smoking about 4 years ago. Her smoking use included cigarettes.  She started smoking about 26 years ago. She has a 10.00 pack-year smoking history. She has never used smokeless tobacco.  Alcohol:      reports no history of alcohol use. Drug Use:  reports no history of drug use. Family History:     Family History   Problem Relation Age of Onset    Stroke Mother     Diabetes Mother     Heart Disease Mother     High Blood Pressure Mother     Heart Disease Father     Heart Disease Brother     High Blood Pressure Brother     Heart Disease Maternal Grandmother     High Blood Pressure Sister        Review of Systems:     Positive and Negative as described in HPI. CONSTITUTIONAL:  negative for fevers, chills, sweats, fatigue, weight loss  HEENT:  negative for vision, hearing changes, runny nose, throat pain  RESPIRATORY: Positive for cough, shortness of breath, wheezing  CARDIOVASCULAR: Positive for chest, worse with cough. GASTROINTESTINAL:  negative for nausea, vomiting, diarrhea, constipation, change in bowel habits, abdominal pain   GENITOURINARY:  negative for difficulty of urination, burning with urination, frequency   INTEGUMENT:  negative for rash, skin lesions, easy bruising   HEMATOLOGIC/LYMPHATIC:  negative for swelling/edema   ALLERGIC/IMMUNOLOGIC:  negative for urticaria , itching  ENDOCRINE:  negative increase in drinking, increase in urination, hot or cold intolerance  MUSCULOSKELETAL:  negative joint pains, muscle aches, swelling of joints  NEUROLOGICAL:  negative for headaches, dizziness, lightheadedness, numbness, pain, tingling extremities      Physical Exam:     BP (!) 147/65   Pulse 58   Temp 98.1 °F (36.7 °C)   Resp 18   Ht 5' 3\" (1.6 m)   Wt 180 lb 6.4 oz (81.8 kg)   SpO2 96%   BMI 31.96 kg/m²   Temp (24hrs), Av.9 °F (36.6 °C), Min:97.7 °F (36.5 °C), Max:98.1 °F (36.7 °C)    No results for input(s): POCGLU in the last 72 hours.   No intake or output data in the 24 hours ending 22 2893    General Appearance:  alert, well appearing, and in no acute distress  Head:  normocephalic, atraumatic. Eye: no icterus, redness, pupils equal and reactive, extraocular eye movements intact, conjunctiva clear  Ear: normal external ear, no discharge, hearing intact  Nose:  no drainage noted  Mouth: mucous membranes moist  Neck: supple, no carotid bruits, thyroid not palpable  Lungs: Air entry but decreased, better wheezing present  Cardiovascular: normal rate, regular rhythm, no murmur, gallop, rub. Abdomen: Soft, nontender, nondistended, normal bowel sounds, no hepatomegaly or splenomegaly  Neurologic: There are no new focal motor or sensory deficits, normal muscle tone and bulk, no abnormal sensation, normal speech, cranial nerves II through XII grossly intact  Skin: No gross lesions, rashes, bruising or bleeding on exposed skin area  Extremities:  peripheral pulses palpable, no pedal edema or calf pain with palpation  Psych: normal affect    Investigations:      Laboratory Testing:  No results found for this or any previous visit (from the past 24 hour(s)).     Imaging/Diagonstics:  Recent data reviewed    Assessment :      Primary Problem  Cervical myelopathy [facility])    Active Hospital Problems    Diagnosis Date Noted    Cervical myelopathy ([facility] [G95.9] 03/17/2022       Plan:     Multiple falls, gait instability, multiple remote and healing rib fractures leading to chest pain-needs PT OT  Neural foraminal narrowing of thoracic spine-neurology as reviewed-recommend rehab and physical therapy  Hypertension-continue amlodipine, Coreg  Right leg DVT-continue Eliquis  Chronic diastolic CHF-currently compensated-continue Coreg, Lipitor, Lasix, lisinopril  Depression -patient noted to be very tearful today-is already on maximal dose of  Celexa, will add BuSpar    DVT prophylaxis-patient on Eliquis    3/27   But has readings of low blood pressure, asymptomatic  Reducing dose of Norvasc to 5 from 10    3/28   Patient has chronic cough, started since she is put on ACE inhibitor  May have ACE inhibitor induced cough, will discontinue lisinopril, start patient on Cozaar  Started patient given nebulization  Has history of smoking  Chest x-ray seen   3/29   Suspecting symptoms are due to acute bronchitis,  Ordering Z-Han, prednisone  EKG seen, troponins are flat  Chest x-ray seen  We will monitor  4/3  Patient shortness of breath is getting better being on steroids  Oxygen requirement going down   We will try to wean oxygen, discussed with RN  Completed antibiotics and steroid  Consultations:     IP CONSULT TO DIETITIAN  IP CONSULT TO SOCIAL WORK  IP CONSULT TO INTERNAL MEDICINE      Jeimy Champion MD  4/3/2022  3:39 PM    Copy sent to Dr. Jeimy Champion MD    Please note that this chart was generated using voice recognition Dragon dictation software. Although every effort was made to ensure the accuracy of this automated transcription, some errors in transcription may have occurred.

## 2022-04-03 NOTE — PROGRESS NOTES
[place] [id number] Internal Medicine    CONSULTATION / HISTORY AND PHYSICAL EXAMINATION            Date:   4/2/2022  Patient name:  Farhan Egan  Date of admission:  3/23/2022  4:52 PM  MRN:   [id number]  Account:  [de-identified]  YOB: 1950  PCP:    Abraham Still MD  Room:   [address] [place] [address]  Code Status:    Full Code    Physician Requesting Consult: Stacy Prather MD    Reason for Consult:  Medical management    Chief Complaint:     No chief complaint on file.   Debility    History Obtained From:     Patient, EMR, nursing staff    History of Present Illness:     78-year-old female initially presented to the multiple falls over several weeks in the setting of chronic cervical spine stenosis with myelopathy status post cervical fusion follows with neurosurgery  Recently started on Eliquis for acute DVT right leg  Trauma work-up CT brain in this admission unremarkable other than multiple remote and healing rib fractures, patient uses walker at home  Neurology review was obtained for worsening gait instability-MRI thoracic spine did show T2-T3 and T5-T7 moderate neural foraminal narrowing  Also patient was noted to have bilateral leg swelling with some stasis dermatitis, early cellulitis-started on diuresis as well as antibiotics  3/26   Patient feeling better  Feels that anxiety is better after starting buspar   Working with PT  3/28 ]  Patient complaining of cough, wheezing  She told the nurse that, cough is going on since she started lisinopril  Had wheezing, which improved with nebulization  3/29   Patient complaining of cough, shortness of breath, wheezing  Chest x-ray seen,  Had EKG, troponins which are flat  Past Medical History:     Past Medical History:   Diagnosis Date    Allergic rhinitis, cause unspecified     Back pain     lumbar    Bowel obstruction (HCC)     history of due to scar tissue, resolved non-surgically    C. difficile diarrhea     CAD (coronary artery disease)     no stent needed per pt.  Dr. Onesimo Yang did cath at [facility]    Cardiac murmur     Cellulitis     left leg    Cellulitis 2017 August    leg left leg/bug bite    Cerebral artery occlusion with cerebral infarction [facility])     TIA 2014    COVID-19     ONE YR AGO IN 4/25/2020 fever and cough    Diverticulosis of colon (without mention of hemorrhage)     GERD (gastroesophageal reflux disease)     GERD (gastroesophageal reflux disease)     on rx    History of blood transfusion     approx 2020        History of CHF (congestive heart failure)     History of MI (myocardial infarction) 2005    thought due to a blood clot    History of ovarian cyst 1970    had oopherectomy holly    History of peritonitis 1968    due to ruptured appendix age 13    HTN (hypertension)     Hx of blood clots     right leg    Hyperlipidemia     Intestinal or peritoneal adhesions with obstruction (postoperative) (postinfection) ([address]     Kidney infection     renal failure/sepsis/spider bite    Lateral epicondylitis  of elbow     MDRO (multiple drug resistant organisms) resistance     c diff    Muscle strain     right posterior shoulder    Other abnormal glucose     PONV (postoperative nausea and vomiting)     dry heaves    Pre-diabetes     Restless legs syndrome (RLS)     Snores     no cpap    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)     TIA (transient ischemic attack) 2014    Uses walker     Vitamin D deficiency     Wears glasses     Wellness examination     last seen 2 weeks ago        Past Surgical History:     Past Surgical History:   Procedure Laterality Date    ABDOMEN SURGERY  1976    benign tumor removed near remaining ovary, 1.5 pounds    APPENDECTOMY  1968    appendix ruptured, developed peritonitis    BACK SURGERY      BUNIONECTOMY Left     along with calcium deposits removed   [address]  2005    negative    CERVICAL FUSION  05/21/2021 POSTERIOR C3-6 LAMINECTOMY, PARTIAL C7 LAMINECTOMY, FUSION C3-C6, SILVERCORD    CERVICAL FUSION N/A 5/21/2021    POSTERIOR C3-6 LAMINECTOMY, PARTIAL C7 LAMINECTOMY, FUSION C3-C6, SILVERCORD performed by Gisella Del Rosario DO at [address] [facility] [address]    12 INCHES REMOVED D/T OBSTRUCTION    COLONOSCOPY      CYST REMOVAL Right     right facial    HYSTERECTOMY  1973    taken as a result of recurring cysts    LUMBAR FUSION N/A 02/10/2020    LUMBAR L4-5 POSTERIOR  DECOMPRESSION INSTRUMENTATION FUSION WCEMENT AUGMENTATION/ performed by Efren Clifton MD at [facility] N/A 06/17/2020    L5-S1 PLIF L4-L5 REVISION performed by Efren Clifton MD at [address] [facility]  08/14/2014    FESS    OVARY REMOVAL  1970    UNILATERAL due to cyst    OVARY REMOVAL  1971    partial, due to cyst    SINUS SURGERY  2004    UPPER GASTROINTESTINAL ENDOSCOPY N/A 05/31/2019    EGD ESOPHAGOGASTRODUODENOSCOPY performed by Sven Fatima MD at [facility] N/A 08/05/2019    EGD BIOPSY performed by Scotty Engel MD at [facility] N/A 08/23/2019    EGD BIOPSY performed by Sven Fatima MD at [address] [facility] 03/05/2019    WRIST OPEN REDUCTION INTERNAL FIXATION performed by Adelfo Lagunas MD at [address]        Medications Prior to Admission:     Prior to Admission medications    Medication Sig Start Date End Date Taking?  Authorizing Provider   amLODIPine (NORVASC) 10 MG tablet Take 1 tablet by mouth daily 3/10/22   Carlito Villegas MD   furosemide (LASIX) 40 MG tablet Take 1 tablet by mouth 2 times daily 3/1/22   MAIK Lim CNP   carvedilol (COREG) 12.5 MG tablet Take 1 tablet by mouth 2 times daily 2/23/22   MAIK Lim CNP   apixaban (ELIQUIS) 5 MG TABS tablet Please take 2 tablets by mouth for the first week then take 1 tablet by mouth BID for acute DVT  Patient taking differently: Take 5 mg by mouth 2 times daily take 1 tablet by mouth BID for acute DVT 2/17/22   MAIK Flores CNP   atorvastatin (LIPITOR) 80 MG tablet Take 0.5 tablets by mouth nightly 2/1/22   MAIK Flores CNP   lisinopril (PRINIVIL;ZESTRIL) 30 MG tablet Take 1 tablet by mouth daily 1/21/22   Celia Rogers MD   fenofibrate micronized (LOFIBRA) 134 MG capsule Take 1 capsule by mouth every morning (before breakfast) 1/13/22   Celia Rogers MD   pramipexole (MIRAPEX) 0.5 MG tablet Take 1 tablet by mouth nightly 1/6/22   Celia Rogers MD   citalopram (CELEXA) 20 MG tablet Take 1 tablet by mouth daily 1/3/22   Iqra Obrien MD   nitroGLYCERIN (NITROSTAT) 0.4 MG SL tablet Place 1 tablet under the tongue every 5 minutes as needed for Chest pain 9/1/21   Celia Rogers MD   docusate sodium (COLACE) 100 MG capsule Take 1 capsule by mouth 2 times daily as needed for Constipation 5/30/21   Senia Moffett Marley, DO   diphenhydrAMINE HCl (BENADRYL ALLERGY PO) Take 1 capsule by mouth daily Takes q HS    Historical Provider, MD   cyanocobalamin ([facility] VITAMIN B12) 1000 MCG tablet Take 1 tablet by mouth daily 12/3/20   Celia Rogers MD   ferrous sulfate (IRON 325) 325 (65 Fe) MG tablet Take 1 tablet by mouth 2 times daily 7/2/20   Hattie Jose MD   VITAMIN D, ERGOCALCIFEROL, PO Take by mouth    Historical Provider, MD   Lancets MISC 1 each by Does not apply route daily 10/10/19   Jeff Argueta MD   blood glucose monitor strips Test 2 times a day & as needed for symptoms of irregular blood glucose. 10/10/19   Jeff Argueta MD   Calcium Carbonate-Vitamin D (CALCIUM 500/D) 500-125 MG-UNIT TABS Take 1 tablet by mouth daily     Historical Provider, MD        Allergies:     Bactrim [sulfamethoxazole-trimethoprim], Codeine, and Seasonal    Social History:     Tobacco:    reports that she quit smoking about 4 years ago. Her smoking use included cigarettes.  She started smoking about 26 years ago. She has a 10.00 pack-year smoking history. She has never used smokeless tobacco.  Alcohol:      reports no history of alcohol use. Drug Use:  reports no history of drug use. Family History:     Family History   Problem Relation Age of Onset    Stroke Mother     Diabetes Mother     Heart Disease Mother     High Blood Pressure Mother     Heart Disease Father     Heart Disease Brother     High Blood Pressure Brother     Heart Disease Maternal Grandmother     High Blood Pressure Sister        Review of Systems:     Positive and Negative as described in HPI. CONSTITUTIONAL:  negative for fevers, chills, sweats, fatigue, weight loss  HEENT:  negative for vision, hearing changes, runny nose, throat pain  RESPIRATORY: Positive for cough, shortness of breath, wheezing  CARDIOVASCULAR: Positive for chest, worse with cough. GASTROINTESTINAL:  negative for nausea, vomiting, diarrhea, constipation, change in bowel habits, abdominal pain   GENITOURINARY:  negative for difficulty of urination, burning with urination, frequency   INTEGUMENT:  negative for rash, skin lesions, easy bruising   HEMATOLOGIC/LYMPHATIC:  negative for swelling/edema   ALLERGIC/IMMUNOLOGIC:  negative for urticaria , itching  ENDOCRINE:  negative increase in drinking, increase in urination, hot or cold intolerance  MUSCULOSKELETAL:  negative joint pains, muscle aches, swelling of joints  NEUROLOGICAL:  negative for headaches, dizziness, lightheadedness, numbness, pain, tingling extremities      Physical Exam:     BP (!) 122/56   Pulse 75   Temp 97.7 °F (36.5 °C) (Oral)   Resp 16   Ht 5' 3\" (1.6 m)   Wt 180 lb 6.4 oz (81.8 kg)   SpO2 92%   BMI 31.96 kg/m²   Temp (24hrs), Av °F (36.7 °C), Min:97.7 °F (36.5 °C), Max:98.2 °F (36.8 °C)    No results for input(s): POCGLU in the last 72 hours.   No intake or output data in the 24 hours ending 22    General Appearance:  alert, well appearing, and in no acute distress  Head:  normocephalic, atraumatic. Eye: no icterus, redness, pupils equal and reactive, extraocular eye movements intact, conjunctiva clear  Ear: normal external ear, no discharge, hearing intact  Nose:  no drainage noted  Mouth: mucous membranes moist  Neck: supple, no carotid bruits, thyroid not palpable  Lungs: Air entry but decreased, better wheezing present  Cardiovascular: normal rate, regular rhythm, no murmur, gallop, rub.   Abdomen: Soft, nontender, nondistended, normal bowel sounds, no hepatomegaly or splenomegaly  Neurologic: There are no new focal motor or sensory deficits, normal muscle tone and bulk, no abnormal sensation, normal speech, cranial nerves II through XII grossly intact  Skin: No gross lesions, rashes, bruising or bleeding on exposed skin area  Extremities:  peripheral pulses palpable, no pedal edema or calf pain with palpation  Psych: normal affect    Investigations:      Laboratory Testing:  Recent Results (from the past 24 hour(s))   Basic Metabolic Panel    Collection Time: 04/02/22  8:08 AM   Result Value Ref Range    Glucose 118 (H) 70 - 99 mg/dL    BUN 62 (H) 8 - 23 mg/dL    CREATININE 1.33 (H) 0.50 - 0.90 mg/dL    Calcium 9.0 8.6 - 10.4 mg/dL    Sodium 140 135 - 144 mmol/L    Potassium 4.1 3.7 - 5.3 mmol/L    Chloride 100 98 - 107 mmol/L    CO2 28 20 - 31 mmol/L    Anion Gap 12 9 - 17 mmol/L    GFR Non-African American 39 (L) >60 mL/min    GFR  48 (L) >60 mL/min    GFR Comment             Imaging/Diagonstics:  Recent data reviewed    Assessment :      Primary Problem  Cervical myelopathy ([facility]    Active Hospital Problems    Diagnosis Date Noted    Cervical myelopathy ([facility] [G95.9] 03/17/2022       Plan:     Multiple falls, gait instability, multiple remote and healing rib fractures leading to chest pain-needs PT OT  Neural foraminal narrowing of thoracic spine-neurology as reviewed-recommend rehab and physical therapy  Hypertension-continue amlodipine, Coreg  Right leg DVT-continue Eliquis  Chronic diastolic CHF-currently compensated-continue Coreg, Lipitor, Lasix, lisinopril  Depression -patient noted to be very tearful today-is already on maximal dose of  Celexa, will add BuSpar    DVT prophylaxis-patient on Eliquis    3/27   But has readings of low blood pressure, asymptomatic  Reducing dose of Norvasc to 5 from 10    3/28   Patient has chronic cough, started since she is put on ACE inhibitor  May have ACE inhibitor induced cough, will discontinue lisinopril, start patient on Cozaar  Started patient given nebulization  Has history of smoking  Chest x-ray seen   3/29   Suspecting symptoms are due to acute bronchitis,  Ordering Z-Han, prednisone  EKG seen, troponins are flat  Chest x-ray seen  We will monitor  4/2  Patient shortness of breath is getting better being on steroids  Oxygen requirement going down   Consultations:     [address] MD  4/2/2022  8:13 PM    Copy sent to Dr. Galen Titus MD    Please note that this chart was generated using voice recognition Dragon dictation software. Although every effort was made to ensure the accuracy of this automated transcription, some errors in transcription may have occurred.

## 2022-04-03 NOTE — PLAN OF CARE
Problem: Skin Integrity:  Goal: Will show no infection signs and symptoms  Description: Will show no infection signs and symptoms  Outcome: Ongoing  Goal: Absence of new skin breakdown  Description: Absence of new skin breakdown  4/3/2022 0352 by Gricelda Sands RN  Outcome: Ongoing  4/2/2022 1727 by Santos Krishna RN  Outcome: Ongoing     Problem: Falls - Risk of:  Goal: Will remain free from falls  Description: Will remain free from falls  4/3/2022 0352 by Gricelda Sands RN  Outcome: Ongoing  4/2/2022 1727 by Santos Krishna RN  Outcome: Ongoing  Goal: Absence of physical injury  Description: Absence of physical injury  4/3/2022 0352 by Gricelda Sands RN  Outcome: Ongoing  4/2/2022 1727 by Santos Krishna RN  Outcome: Ongoing     Problem: Pain:  Goal: Pain level will decrease  Description: Pain level will decrease  Outcome: Ongoing  Goal: Control of acute pain  Description: Control of acute pain  4/3/2022 0352 by Gricelda Sands RN  Outcome: Ongoing  4/2/2022 1727 by Santos Krishna RN  Outcome: Ongoing  Goal: Control of chronic pain  Description: Control of chronic pain  Outcome: Ongoing     Problem: Musculor/Skeletal Functional Status  Goal: Highest potential functional level  Outcome: Ongoing  Goal: Absence of falls  4/3/2022 0352 by Gricelda Sands RN  Outcome: Ongoing  4/2/2022 1727 by Santos Krishna RN  Outcome: Ongoing     Problem: Mobility - Impaired:  Goal: Mobility will improve  Description: Mobility will improve  Outcome: Ongoing     Problem: Musculor/Skeletal Functional Status  Goal: Highest potential functional level  Outcome: Ongoing  Goal: Absence of falls  Outcome: Ongoing

## 2022-04-03 NOTE — PROGRESS NOTES
Physical Medicine & Rehabilitation  Progress Note    4/3/2022 10:57 AM     CC: Ambulatory and ADL dysfunction due to neuropathy with ataxia. B/B ok , last BM this AM.    Subjective:   No complaints, O2 2 L and resp treatment . ROS:  Denies fevers, chills, sweats. No chest pain, palpitations, lightheadedness. Positive for coughing, denies wheezing or shortness of breath. Denies abdominal pain, nausea, diarrhea or constipation. No new areas of joint pain. Denies new areas of numbness or weakness. Denies new anxiety or depression issues. No new skin problems. Rehabilitation:   PT:     Bed Mobility  Supine to Sit: Stand by assistance  Sit to Supine: Minimal assistance (w/RLE)  Scooting: Stand by assistance  Comment: Completed on mat table with 2 pillows. Pt takes extra time to complete tasks, (struggle for pt). Required Min A with getting RLEup into bed. Bed mobility  Scooting: Stand by assistance     Transfers:  Sit to Stand: Contact guard assistance;Minimal Assistance (w/VC's for technique)  Stand to sit: Contact guard assistance;Minimal Assistance (vc's for technique)  Ambulation 1  Surface: level tile  Device: Rolling Walker  Assistance: Contact guard assistance  Quality of Gait: slow tawanda with step to pattern, increase tone in LLE causing decrease knee flexion and flat foot LE at contact with amb, decreased stance on L LE; downward gaze and cervical flexion -cues to correct all (slight drift to her L)  Gait Deviations: Slow Tawanda;Decreased step length;Decreased step height  Distance: 151ft  Comments: SPO2 94-95% during and post amb on 2L.  (one standing rest break taken during amb)  Ambulation 2  Surface - 2: level tile  Device 2: Rolling Walker  Assistance 2: Contact guard assistance;Minimal assistance  Quality of Gait 2: same as above (again slight drifting to her L)  Gait Deviations: Slow Tawanda;Decreased step length;Decreased step height  Distance: 40ft x 1, 10ft x 1  Comments: short distances within gym. Pt did experience one brief episode of LOB from catching her L toe w/amb and required Min A to maintain bal/safety  Stairs/Curb  Stairs?: Yes  Stairs  # Steps : 10  Stairs Height:  (4\"/6\")  Rails: Bilateral  Device: No Device  Assistance: Contact guard assistance  Comment: Pt able to ascend/decend steps with step to pattern. VC's for correct sequencing on first pass, pt able to complete correctly  without vc's second time over.     BALANCE Posture: Fair  Sitting - Static: Good  Standing - Static: Fair;+  Standing - Dynamic: Fair  Comments: Standing with RW. Cognition  Overall Cognitive Status: WFL  Perception  Overall Perceptual Status: WFL  Balance  Sitting Balance: Supervision  Standing Balance: Contact guard assistance  Bed mobility  Rolling to Left: Minimal assistance  Rolling to Right: Minimal assistance  Supine to Sit: Minimal assistance  Sit to Supine: Moderate assistance  Scooting: Minimal assistance  Transfers  Sit to stand: Minimal assistance  Stand to sit: Contact guard assistance  Standing Balance  Time: 1 min x3   Activity: ADL activities   Comment: 1-2 UE support  Functional Mobility  Functional - Mobility Device: Wheelchair  Activity: To/from bathroom  Assist Level: Minimal assistance  Functional Mobility Comments: Propeling self with BUEs   Toilet Transfers  Toilet - Technique: Stand pivot  Equipment Used: Grab bars  Toilet Transfer: Minimal assistance  Toilet Transfers Comments: VC's for hand placement/technique   Fine Motor: Pt completing activity placing small shapes on to corosponding placement. Type of ROM/Therapeutic Exercise  Type of ROM/Therapeutic Exercise: Free weights  Comment: BUE exercises 1# free weight 20 reps. No reports of pain noted this date.  Completed to increase strength and endurnace for ADL and IADL activites   Exercises  Scapular Protraction: x  Scapular Retraction: x  Shoulder Flexion: x  Shoulder Extension: x  Horizontal ABduction: x  Horizontal ADduction: x  Elbow Flexion: x  Elbow Extension: x  Supination: x  Pronation: x  Grasp/Release: Pt engaged in BUE hand strneghting with hand grippers ( 1.5, 3.0, 5.0#) 15 reps. Completed to increase strength and endurance of B hands. ADL  Equipment Provided: Reacher;Long-handled sponge  Feeding: Setup; Increased time to complete  Grooming: Setup (seated at sink for oral care and hair care )  UE Bathing: Setup  LE Bathing: Minimal assistance (assist washing buttocks )  UE Dressing: Stand by assistance;Verbal cueing; Increased time to complete (cues for putting on correctly )  LE Dressing: Minimal assistance (assist threadinf BLE into pants )  Toileting: Contact guard assistance  Additional Comments: Pt seated atsink to complete ADL activities      Assessment  Performance deficits / Impairments: Decreased functional mobility ; Decreased ADL status; Decreased strength;Decreased endurance;Decreased sensation;Decreased balance;Decreased high-level IADLs;Decreased fine motor control;Decreased coordination  Prognosis: Good  Discharge Recommendations: Patient would benefit from continued therapy after discharge  Activity Tolerance: Patient Tolerated treatment well     Patient Education:  Patient Goals   Patient goals : \"To be able to put my pants/socks on better\"  Learner:patient  Method: demonstration and explanation       Outcome: acknowledged understanding       Plan  Plan  Times per week: 5-7  Times per day: Twice a day  Current Treatment Recommendations: Balance Training,Functional Mobility Training,Endurance Training,Strengthening,ROM,Safety Education & Training,Patient/Caregiver Education & Training,Equipment Evaluation, Education, & procurement,Self-Care / [employer]ir[employer] Management  Patient Goals   Patient goals :  \"To be able to put my pants/socks on better\"  Short term goals  Time Frame for Short term goals: 1 week   Short term goal 1: Pt will complete LB dressing/bathing/toileting CGA with Good safety with use of AE/DME/modified techniques for increased IND with self care  Short term goal 2: Pt will participate in 30+ minutes functional activity for increased strength/balance/endurance for increased IND in ADL's  Short term goal 3: Pt will complete transfers/functional mobility CGA with Good safety and RW for increased IND in ADL's  Short term goal 4: Pt will verbalize/demonstrate Good understanding of AE/DME/modified techniques for increased IND in self care  Short term goal 5: Pt will complete UB bathing/dressing/grooming with Supervision for increased IND in self care  Long term goals  Time Frame for Long term goals : by discharge   Long term goal 1: Pt will complete toileting/grooming/dressing Mod I and bathing with Supervison with Good safety with use of AE/DME/modified techniques for increased IND with self care  Long term goal 2: Pt will complete functional mobility/transferes during functional activity Mod I with Good safety and RW for increased IND in ADL's  Long term goal 3: Pt will verbalize/demonstrate Good understanding of fall prevention strategies for increased safety/IND in self care  Long term goal 4: Pt will improve L hand strength for self care as evidence by a 3# improvement in dynomometor testing   Long term goal 5: Pt will improve L [address] as evidence by a 20 second improvement on 9 hole peg test for increased IND with self care       22 1445 22 0901   OT Individual Minutes   Time In 1300 0901   Time Out 1337 1000   Minutes 37 59                              Objective:  BP (!) 147/65   Pulse 58   Temp 98.1 °F (36.7 °C)   Resp 16   Ht 5' 3\" (1.6 m)   Wt 180 lb 6.4 oz (81.8 kg)   SpO2 96%   BMI 31.96 kg/m²  I Body mass index is 31.96 kg/m².  I   Wt Readings from Last 1 Encounters:   22 180 lb 6.4 oz (81.8 kg)      Temp (24hrs), Av.9 °F (36.6 °C), Min:97.7 °F (36.5 °C), Max:98.1 °F (36.7 °C)         GEN: well developed, well nourished, no acute distress  HEENT: Normocephalic atraumatic, EOMI, mucous membranes pink and moist  CV: RRR, no murmurs, rubs or gallops  PULM:  Respirations WNL and unlabored, palpation sternal area reproduces sternal discomfort-no change  ABD: soft, NT, ND, +BS and equal  NEURO: A&O x3. Sensation intact to light touch. MSK: At least antigravity to 4 -/5 strength  EXTREMITIES: No calf tenderness to palpation bilaterally. No edema BLEs  SKIN: warm dry and intact with good turgor  PSYCH: appropriately interactive. Affect WNL. Medications   Scheduled Meds:   ipratropium-albuterol  1 ampule Inhalation 4x daily    losartan  100 mg Oral Daily    amLODIPine  5 mg Oral Daily    gabapentin  200 mg Oral BID    lidocaine  1 patch TransDERmal Daily    busPIRone  5 mg Oral TID    apixaban  5 mg Oral BID    atorvastatin  40 mg Oral Nightly    calcium carbonate w/vitamin D  1 tablet Oral Daily    carvedilol  12.5 mg Oral BID    citalopram  20 mg Oral Daily    fenofibrate  160 mg Oral Daily    ferrous sulfate  325 mg Oral BID    furosemide  20 mg Oral BID    pramipexole  0.5 mg Oral Nightly    cyanocobalamin  1,000 mcg Oral Daily    polyethylene glycol  17 g Oral Daily     Continuous Infusions:  PRN Meds:.albuterol, guaiFENesin, traMADol, melatonin, magnesium hydroxide, docusate sodium, acetaminophen, senna, bisacodyl     Diagnostics:     CBC:   No results for input(s): WBC, RBC, HGB, HCT, MCV, RDW, PLT in the last 72 hours. BMP:   Recent Labs     04/02/22  0808      K 4.1      CO2 28   BUN 62*   CREATININE 1.33*     BNP: No results for input(s): BNP in the last 72 hours. PT/INR: No results for input(s): PROTIME, INR in the last 72 hours. APTT: No results for input(s): APTT in the last 72 hours. CARDIAC ENZYMES: No results for input(s): CKMB, CKMBINDEX, TROPONINT in the last 72 hours.     Invalid input(s): CKTOTAL;3  FASTING LIPID PANEL:  Lab Results   Component Value Date    CHOL 103 05/20/2019    HDL 74 03/12/2021    TRIG 66 05/20/2019     LIVER PROFILE: No results for input(s): AST, ALT, ALB, BILIDIR, BILITOT, ALKPHOS in the last 72 hours. I/O (24Hr): No intake or output data in the 24 hours ending 04/03/22 1057    Glu last 24 hour  No results for input(s): POCGLU in the last 72 hours. No results for input(s): CLARITYU, COLORU, PHUR, SPECGRAV, PROTEINU, RBCUA, BLOODU, BACTERIA, NITRU, WBCUA, LEUKOCYTESUR, YEAST, GLUCOSEU, BILIRUBINUR in the last 72 hours. Chest x-ray 3/23/2022  Old left rib fractures noted.  Examination is otherwise unremarkable.         3/29/22 chest x-ray  Impression:        No acute cardiopulmonary disease           Impression/Plan:    1. Ataxia with L sided weakness:  PT/OT for gait, mobility, strengthening, endurance, ADLs, and self care. On Pramipexole discharge plan 4/6  2. Cervical myelopathy: Hx C3-6 decompression. Has gabapentin and prn Tylenol for pain. 3.  sternal discomfort reproducible -suspect costochondritis, patient unable to have anti-inflammatories-continue Ultram, cardiac qhge-mg-lomqqbol medicine evaluated, suspect bronchitis -  4. Pulmonary-discussed internal medicine today, Dr. Radha Martin bronchitis, on steroids and Zithromax, will evaluate today, reviewed  increased O2 needs on Proventil nebulizer, DuoNeb and Robitussin  5. Chronic diastolic heart failure/HTN/CAD: on amlodipine, lipitor, carvedilol, fenofibrate, , furosemide-Norvasc decreased, discontinued and started on Cozaar by internal medicine, monitor, chest x-ray negative 3/29  6. Major depressive disorder: on celexa. -addition of BuSpar by internal medicine, consider psych evaluation if patient agreeable-patient does not want psych evaluation at this time, denies suicidal /homicidal ideation continue to monitor, patient notes will notify if would like eval  7. Chronic DVT: on eliquis - monitor with history of falls  8. Anemia: Hb near normal. On ferrous sulfate.   Monitor ,trending down 12.0-11 0.4-10.2-10.7 check stool pending  9. Lower extremity cellulitis: completed Keflex   10. BIN:. Monitoring BMP,  on Lasix lisinopril-creatinine trending down 4/1 1.36, - 4/2 1.33 , ? decrease Lasix/push fluids , concern of CHF, internal medicine follow-up  11. Restless left leg -Mirapex  12. Insomnia: has melatonin prn   13. Pain-Lidoderm patch to right shoulder Neurontin  14. Bowel Management: Miralax daily, senokot prn, dulcolax prn. 15. DVT Prophylaxis:  SCD's while in bed, PRAVEEN's  and Eliquis  16.  Internal medicine for medical management        Waylon Beltran MD

## 2022-04-03 NOTE — PLAN OF CARE
Problem: Skin Integrity:  Goal: Will show no infection signs and symptoms  Description: Will show no infection signs and symptoms  4/3/2022 0928 by Antonio Albarran RN  Outcome: Ongoing  4/3/2022 0352 by Linda Jalloh RN  Outcome: Ongoing  Goal: Absence of new skin breakdown  Description: Absence of new skin breakdown  4/3/2022 0928 by Antonio Albarran RN  Outcome: Ongoing  4/3/2022 0352 by Linda Jalloh RN  Outcome: Ongoing     Problem: Falls - Risk of:  Goal: Will remain free from falls  Description: Will remain free from falls  4/3/2022 0928 by Antonio Albarran RN  Outcome: Ongoing  4/3/2022 0352 by Linda Jalloh RN  Outcome: Ongoing  Goal: Absence of physical injury  Description: Absence of physical injury  4/3/2022 0928 by Antonio Albarran RN  Outcome: Ongoing  4/3/2022 0352 by Linda Jalloh RN  Outcome: Ongoing     Problem: Pain:  Goal: Pain level will decrease  Description: Pain level will decrease  4/3/2022 0928 by Antonio Albarran RN  Outcome: Ongoing  4/3/2022 0352 by Linda Jalloh RN  Outcome: Ongoing  Goal: Control of acute pain  Description: Control of acute pain  4/3/2022 0928 by Antonio Albarran RN  Outcome: Ongoing  4/3/2022 0352 by Linda Jalloh RN  Outcome: Ongoing  Goal: Control of chronic pain  Description: Control of chronic pain  4/3/2022 0928 by Antonio Albarran RN  Outcome: Ongoing  4/3/2022 0352 by Linda Jalloh RN  Outcome: Ongoing     Problem: Mobility - Impaired:  Goal: Mobility will improve  Description: Mobility will improve  4/3/2022 0928 by Antonio Albarran RN  Outcome: Ongoing  4/3/2022 0352 by Linda Jalloh RN  Outcome: Ongoing     Problem: Musculor/Skeletal Functional Status  Goal: Highest potential functional level  4/3/2022 0928 by Antonio Albarran RN  Outcome: Ongoing  4/3/2022 0352 by Linda Jalloh RN  Outcome: Ongoing  Goal: Absence of falls  4/3/2022 0928 by Antonio Albarran RN  Outcome: Ongoing  4/3/2022 0352 by Linda Jalloh, RN  Outcome: Ongoing     Problem: Musculor/Skeletal Functional Status  Goal: Highest potential functional level  4/3/2022 0928 by Benji Ellis RN  Outcome: Ongoing  4/3/2022 0352 by Amada Mcmanus RN  Outcome: Ongoing  Goal: Absence of falls  4/3/2022 0928 by Benji Ellis RN  Outcome: Ongoing  4/3/2022 0352 by Amada Mcmanus RN  Outcome: Ongoing     Problem: Nutrition  Goal: Optimal nutrition therapy  4/3/2022 0928 by Benji Ellis RN  Outcome: Ongoing  4/3/2022 0352 by Amada Mcmanus RN  Outcome: Ongoing

## 2022-04-04 PROCEDURE — 97110 THERAPEUTIC EXERCISES: CPT

## 2022-04-04 PROCEDURE — 6370000000 HC RX 637 (ALT 250 FOR IP): Performed by: PHYSICAL MEDICINE & REHABILITATION

## 2022-04-04 PROCEDURE — 97535 SELF CARE MNGMENT TRAINING: CPT

## 2022-04-04 PROCEDURE — 99232 SBSQ HOSP IP/OBS MODERATE 35: CPT | Performed by: INTERNAL MEDICINE

## 2022-04-04 PROCEDURE — 6370000000 HC RX 637 (ALT 250 FOR IP): Performed by: INTERNAL MEDICINE

## 2022-04-04 PROCEDURE — 94761 N-INVAS EAR/PLS OXIMETRY MLT: CPT

## 2022-04-04 PROCEDURE — 99232 SBSQ HOSP IP/OBS MODERATE 35: CPT | Performed by: STUDENT IN AN ORGANIZED HEALTH CARE EDUCATION/TRAINING PROGRAM

## 2022-04-04 PROCEDURE — 97530 THERAPEUTIC ACTIVITIES: CPT

## 2022-04-04 PROCEDURE — 97116 GAIT TRAINING THERAPY: CPT

## 2022-04-04 PROCEDURE — 6360000002 HC RX W HCPCS: Performed by: PHYSICAL MEDICINE & REHABILITATION

## 2022-04-04 PROCEDURE — 6370000000 HC RX 637 (ALT 250 FOR IP): Performed by: STUDENT IN AN ORGANIZED HEALTH CARE EDUCATION/TRAINING PROGRAM

## 2022-04-04 PROCEDURE — 1180000000 HC REHAB R&B

## 2022-04-04 PROCEDURE — 6370000000 HC RX 637 (ALT 250 FOR IP): Performed by: NURSE PRACTITIONER

## 2022-04-04 PROCEDURE — 94640 AIRWAY INHALATION TREATMENT: CPT

## 2022-04-04 PROCEDURE — 2500000003 HC RX 250 WO HCPCS: Performed by: PHYSICAL MEDICINE & REHABILITATION

## 2022-04-04 PROCEDURE — 2700000000 HC OXYGEN THERAPY PER DAY

## 2022-04-04 RX ADMIN — CARVEDILOL 12.5 MG: 12.5 TABLET, FILM COATED ORAL at 08:00

## 2022-04-04 RX ADMIN — PRAMIPEXOLE DIHYDROCHLORIDE 0.5 MG: 0.25 TABLET ORAL at 21:09

## 2022-04-04 RX ADMIN — IPRATROPIUM BROMIDE AND ALBUTEROL SULFATE 1 AMPULE: 2.5; .5 SOLUTION RESPIRATORY (INHALATION) at 12:19

## 2022-04-04 RX ADMIN — CITALOPRAM HYDROBROMIDE 20 MG: 20 TABLET ORAL at 08:00

## 2022-04-04 RX ADMIN — BUSPIRONE HYDROCHLORIDE 5 MG: 5 TABLET ORAL at 21:11

## 2022-04-04 RX ADMIN — FENOFIBRATE 160 MG: 160 TABLET ORAL at 08:00

## 2022-04-04 RX ADMIN — APIXABAN 5 MG: 5 TABLET, FILM COATED ORAL at 21:09

## 2022-04-04 RX ADMIN — BUSPIRONE HYDROCHLORIDE 5 MG: 5 TABLET ORAL at 14:42

## 2022-04-04 RX ADMIN — FERROUS SULFATE TAB 325 MG (65 MG ELEMENTAL FE) 325 MG: 325 (65 FE) TAB at 21:13

## 2022-04-04 RX ADMIN — FUROSEMIDE 20 MG: 20 TABLET ORAL at 08:00

## 2022-04-04 RX ADMIN — IPRATROPIUM BROMIDE AND ALBUTEROL SULFATE 1 AMPULE: 2.5; .5 SOLUTION RESPIRATORY (INHALATION) at 20:05

## 2022-04-04 RX ADMIN — FERROUS SULFATE TAB 325 MG (65 MG ELEMENTAL FE) 325 MG: 325 (65 FE) TAB at 08:01

## 2022-04-04 RX ADMIN — IPRATROPIUM BROMIDE AND ALBUTEROL SULFATE 1 AMPULE: 2.5; .5 SOLUTION RESPIRATORY (INHALATION) at 06:46

## 2022-04-04 RX ADMIN — Medication 6 MG: at 21:09

## 2022-04-04 RX ADMIN — ATORVASTATIN CALCIUM 40 MG: 40 TABLET, FILM COATED ORAL at 21:09

## 2022-04-04 RX ADMIN — IPRATROPIUM BROMIDE AND ALBUTEROL SULFATE 1 AMPULE: 2.5; .5 SOLUTION RESPIRATORY (INHALATION) at 15:15

## 2022-04-04 RX ADMIN — CARVEDILOL 12.5 MG: 12.5 TABLET, FILM COATED ORAL at 21:09

## 2022-04-04 RX ADMIN — SALINE NASAL SPRAY 1 SPRAY: 1.5 SOLUTION NASAL at 16:14

## 2022-04-04 RX ADMIN — GUAIFENESIN 100 MG: 200 SOLUTION ORAL at 16:14

## 2022-04-04 RX ADMIN — AMLODIPINE BESYLATE 5 MG: 5 TABLET ORAL at 08:01

## 2022-04-04 RX ADMIN — APIXABAN 5 MG: 5 TABLET, FILM COATED ORAL at 08:01

## 2022-04-04 RX ADMIN — GABAPENTIN 200 MG: 100 CAPSULE ORAL at 21:09

## 2022-04-04 RX ADMIN — CYANOCOBALAMIN TAB 1000 MCG 1000 MCG: 1000 TAB at 08:00

## 2022-04-04 RX ADMIN — BUSPIRONE HYDROCHLORIDE 5 MG: 5 TABLET ORAL at 08:02

## 2022-04-04 RX ADMIN — GABAPENTIN 200 MG: 100 CAPSULE ORAL at 08:01

## 2022-04-04 RX ADMIN — LOSARTAN POTASSIUM 100 MG: 100 TABLET, FILM COATED ORAL at 08:01

## 2022-04-04 RX ADMIN — CALCIUM CARBONATE 600 MG (1,500 MG)-VITAMIN D3 400 UNIT TABLET 1 TABLET: at 08:01

## 2022-04-04 RX ADMIN — FUROSEMIDE 20 MG: 20 TABLET ORAL at 17:45

## 2022-04-04 ASSESSMENT — PAIN DESCRIPTION - LOCATION
LOCATION_2: SHOULDER
LOCATION: NECK
LOCATION: NECK;CHEST

## 2022-04-04 ASSESSMENT — PAIN SCALES - WONG BAKER: WONGBAKER_NUMERICALRESPONSE: 6

## 2022-04-04 ASSESSMENT — PAIN SCALES - GENERAL: PAINLEVEL_OUTOF10: 4

## 2022-04-04 ASSESSMENT — PAIN DESCRIPTION - ONSET: ONSET: GRADUAL

## 2022-04-04 ASSESSMENT — PAIN DESCRIPTION - FREQUENCY: FREQUENCY: CONTINUOUS

## 2022-04-04 ASSESSMENT — PAIN DESCRIPTION - INTENSITY: RATING_2: 6

## 2022-04-04 ASSESSMENT — PAIN DESCRIPTION - ORIENTATION
ORIENTATION: POSTERIOR
ORIENTATION_2: LEFT;MID;UPPER

## 2022-04-04 ASSESSMENT — PAIN DESCRIPTION - PAIN TYPE: TYPE: CHRONIC PAIN;ACUTE PAIN

## 2022-04-04 ASSESSMENT — PAIN DESCRIPTION - DESCRIPTORS: DESCRIPTORS: SORE

## 2022-04-04 NOTE — PROGRESS NOTES
[facility]: [facility] HINTON   ACUTE REHABILITATION OCCUPATIONAL THERAPY  DAILY NOTE    Date: 22  Patient Name: Sophie Jacques      Room: 9722/2402-35    MRN: [id number]   : 1950  (71 y.o.)  Gender: female   Referring Practitioner: Marjorie Hinton MD  Diagnosis: Cervical myelopathy  Additional Pertinent Hx: 71 y.o.  female, with a history of anemia, spondylolisthesis, chronic DVT, chronic diastolic heart failure, CVA, major depressive disorder, s/p cervical spine fusion, stenosis of cervical spine with myelopathy, and DM type II, who presents with leg pain, shortness of breath, fall, and neck pain. According to patient and her , patient has had multiple falls recently including a fall Monday and evening and again on Tuesday. Patient reports that today she felt extremely weak upon waking. Restrictions  Restrictions/Precautions: General Precautions,Fall Risk  Implants present? : Metal implants (cervical and lumbar surgeries, L wrist ORIF)  Other position/activity restrictions: up as tolerated; O2 3L/m via NC  Required Braces or Orthoses?: No  Equipment Used: Other (RW)    Subjective  Subjective: \"I feel okay today\"  Comments: Pt pleasent and agreeable for OT treatment. Patient Currently in Pain: Yes  Pain Level: 4  Pain Location: Neck; Chest  Restrictions/Precautions: General Precautions; Fall Risk  Overall Orientation Status: Within Functional Limits     Pain Assessment  Pain Assessment: 0-10  Pain Level: 4  Pain Type: Chronic pain,Acute pain  Pain Location: Neck,Chest  Pain Orientation: Mid    Objective  Cognition  Overall Cognitive Status: WFL  Perception  Overall Perceptual Status: WFL  Balance  Sitting Balance: Supervision  Standing Balance: Contact guard assistance  Transfers  Stand Pivot Transfers: Contact guard assistance  Sit to stand: Minimal assistance (from recliner)  Stand to sit: Contact guard assistance  Transfer Comments: QUINONES blocked L foot to prevent from slipping. Cued for technique as needed and locking brakes. Standing Balance  Time: AM: <1 minute x4  Activity: functional transfers, LB ADLs  Comment: 1-2 UE support  Functional Mobility  Functional - Mobility Device: Wheelchair  Activity: To/from bathroom  Assist Level: Contact guard assistance  Fine Motor: Pt instruction in [address] and instrinisc hand strengthening tasks for ease with ADLs. Pt completes graded resistive clothes pins with good time. ; PM: engaging in game of Trouble for hand strengthening to press pop socket and roll dice. Type of ROM/Therapeutic Exercise  Type of ROM/Therapeutic Exercise: Free weights  Comment: QUINONES facilitated pt's engagement in BUE exercises to promote strength/endurance for improved occupational performance and safe transfers. 2# weight, 1 set 10-15 reps, with good tolerance and frequent rest breaks. Cued for slowing pace and increased ROM as tolerated. Exercises  Scapular Protraction: x  Scapular Retraction: x  Shoulder Flexion: x  Shoulder Extension: x  Shoulder ABduction: x  Shoulder ADduction: x  Horizontal ABduction: x  Horizontal ADduction: x  Elbow Flexion: x  Elbow Extension: x  Supination: x  Pronation: x  Wrist Flexion: x  Wrist Extension: x  Grasp/Release: Pt engaged in hand strengthening, using hadn gripper at 2nd setting. Verbal/tactile cueing for technique and slowed pace, good tolerance. Other: Red Theraflex up/down X10 reps each. ADL  Grooming: Setup (seated at sinkside; brushed/straighten hair, brushed teeth)  UE Bathing: None (Pt req no need at this time)  LE Bathing: None (Pt req no need at this time)  UE Dressing: Stand by assistance;Verbal cueing; Increased time to complete  LE Dressing: Minimal assistance (A pulling overhips 2* fatigue after increased time threading)  Additional Comments: QUINONES facilitated pt in ADLs at the sink this AM. During LE dressing pt req increased time for threading BLEs.  Able to complete without reacher this AM.           Assessment  Performance deficits / Impairments: Decreased functional mobility ; Decreased ADL status; Decreased strength;Decreased endurance;Decreased sensation;Decreased balance;Decreased high-level IADLs;Decreased fine motor control;Decreased coordination  Prognosis: Good  Discharge Recommendations: Patient would benefit from continued therapy after discharge  Activity Tolerance: Patient Tolerated treatment well  Safety Devices in place: Yes  Type of devices: Left in chair (Pt left supervised in PT gym at end of sessions. )  Restraints  Initially in place: No          Patient Education: OT POC, safety with functional transfers, AE and modified techniques for ADLs  Patient Goals   Patient goals : \"To be able to put my pants/socks on better\"  Learner:patient  Method: explanation       Outcome: acknowledged understanding of         Plan  Plan  Times per week: 5-7  Times per day: Twice a day  Current Treatment Recommendations: Balance Training,Functional Mobility Training,Endurance Training,Strengthening,ROM,Safety Education & Training,Patient/Caregiver Education & Training,Equipment Evaluation, Education, & procurement,Self-Care / Jordin Obrien Management  Patient Goals   Patient goals :  \"To be able to put my pants/socks on better\"  Short term goals  Time Frame for Short term goals: 1 week   Short term goal 1: Pt will complete LB dressing/bathing/toileting CGA with Good safety with use of AE/DME/modified techniques for increased IND with self care  Short term goal 2: Pt will participate in 30+ minutes functional activity for increased strength/balance/endurance for increased IND in ADL's  Short term goal 3: Pt will complete transfers/functional mobility CGA with Good safety and RW for increased IND in ADL's  Short term goal 4: Pt will verbalize/demonstrate Good understanding of AE/DME/modified techniques for increased IND in self care  Short term goal 5: Pt will complete UB bathing/dressing/grooming with Supervision for increased IND in self care  Long term goals  Time Frame for Long term goals : by discharge   Long term goal 1: Pt will complete toileting/grooming/dressing Mod I and bathing with Supervison with Good safety with use of AE/DME/modified techniques for increased IND with self care  Long term goal 2: Pt will complete functional mobility/transferes during functional activity Mod I with Good safety and RW for increased IND in ADL's  Long term goal 3: Pt will verbalize/demonstrate Good understanding of fall prevention strategies for increased safety/IND in self care  Long term goal 4: Pt will improve L hand strength for self care as evidence by a 3# improvement in dynomometor testing   Long term goal 5: Pt will improve L FMC as evidence by a 20 second improvement on 9 hole peg test for increased IND with self care        04/04/22 1206 04/04/22 1535   OT Individual Minutes   Time In 0902 1301   Time Out 1002 1331   Minutes 60 30     Electronically signed by PATRICIA Wills on 4/4/22 at 3:35 PM EDT

## 2022-04-04 NOTE — PATIENT CARE CONFERENCE
[address]   ACUTE REHABILITATION  TEAM CONFERENCE NOTE  Date: 22  Patient Name: Devi Sauer       Room: 7675/1910-99  MRN: [id number]       : 1950  (71 y.o.)     Gender: female   Referring Practitioner: Dr Jasmina Felipe. Cervical myelopathy ([facility]) [G95.9]  Diagnosis: Cervical myelopathy     NURSING  Bladder  Always Continent  Bowel   Always Continent  Date of Last BM: 22  Intervention    Bowel Program    Wounds/Incisions/Ulcers: Redness, scabs on BLE  Medication Education Program: Patient able to manage medications and being educated by nursing  Pain: Patient's pain is currently controlled with -  Tylenol 650 mg every 4 hours PRN and Tramadol 50 mg tab every 6 hours PRN    Fall Risk:  Falling star program initiated    PHYSICAL THERAPY  Bed mobility  Rolling to Left: Minimal assistance  Rolling to Right: Minimal assistance  Supine to Sit: Minimal assistance (A trunk in upright position )  Sit to Supine:  Moderate assistance (A BLEs )  Scooting: Minimal assistance  Comment: MOD A to return to bed for BLE management due to increased pain and fatigue    Transfers:  Sit to Stand: Contact guard assistance;Minimal Assistance (w/VC's for technique)  Stand to sit: Contact guard assistance;Minimal Assistance (vc's for technique)  Bed to Chair: Contact guard assistance;Minimal assistance (w/VC's for technique)    Ambulation 1  Surface: level tile  Device: Rolling Walker  Assistance: Contact guard assistance  Quality of Gait: slow tawanda with step to pattern, increase tone in LLE causing decrease knee flexion and flat foot LE at contact with amb, decreased stance on L LE; downward gaze and cervical flexion -cues to correct all (slight drift to her L)  Gait Deviations: Slow Tawanda;Decreased step length;Decreased step height  Distance: 140ft  Comments: SPO2 96% on 2L/min  Ambulation 2  Surface - 2: level tile;ramp  Device 2: Rolling Walker  Assistance 2: Contact guard assistance;Minimal assistance  Quality of Gait 2: demos improved ability to flex L knee with amb when prompted to slow down and take larger steps with RLE    Gait Deviations: Slow Alicia;Decreased step length;Decreased step height;Deviated path (veers left)  Distance: 158ft  Comments: Fatigued quickly with amb on ramp. Slightly off balance at times    # Steps : 10  Stairs Height:  (4\"/6\")  Rails: Bilateral  Device: No Device  Assistance: Contact guard assistance  Comment: Pt able to verbalized and demonstrate correct step to sequencing. CGA. Slight circumduction of LLE with ascending steps. VC's to correct       Pt presents with frequent falls at home, has generalized weakness, and debility, but L LE weaker thant R LE. Pt with decreased balance, decreased motor planning and weakness contributing to her falls. Pt with recent R LE DVT and R LE isswollen at this time. Pt is high fall risk at this time     Goals  Time Frame for Short term goals: 1 week  Short term goal 1: Pt to demostrate bed mobility CGA/Jennifer. Short term goal 2: Pt to perform transfers CGA. Short term goal 3: Pt to amb 100'-150' with device, CGA. Short term goal 4: Pt to improve BLE strength by 1/2 MMG. Short term goal 5: Pt to improve standing balance to [facility]. Short term goal 6: Pt able to perform 4\" and 6\" steps x3 in //bars or acute stair way, with 2 B UE support, min A      OCCUPATIONAL THERAPY  SELF CARE   Equipment Provided: Reacher,Long-handled sponge  Eating            Setup    Oral Hygiene            Setup (seated at sinkside; brushed/straighten hair, brushed teeth)   Shower/Bathe Self             UE Bathing: Setup  LE Bathing: Contact guard assist  Upper Body Dressing            Stand by assistance;Verbal cueing; Increased time to complete   Lower Body Dressing            Putting On/Taking Off Footwear             Minimal assistance (A pulling overhips 2* fatigue after increased time threading)   Toilet Transfer               Contact guard assist  Toileting Hygiene             Contact guard assist             Balance  Sitting Balance: Supervision  Standing Balance: Contact guard assistance  Standing Balance  Time: AM: <1 minute x4  Activity: functional transfers, LB ADLs  Comment: 1-2 UE support    Equipment Recommendations  Equipment Needed:  (TBD)       Short term goals  Time Frame for Short term goals: 1 week   Short term goal 1: Pt will complete LB dressing/bathing/toileting CGA with Good safety with use of AE/DME/modified techniques for increased IND with self care  Short term goal 2: Pt will participate in 30+ minutes functional activity for increased strength/balance/endurance for increased IND in ADL's  Short term goal 3: Pt will complete transfers/functional mobility CGA with Good safety and RW for increased IND in ADL's  Short term goal 4: Pt will verbalize/demonstrate Good understanding of AE/DME/modified techniques for increased IND in self care  Short term goal 5: Pt will complete UB bathing/dressing/grooming with Supervision for increased IND in self care      SPEECH THERAPY      NUTRITION  Weight: 180 lb 6.4 oz (81.8 kg) / Body mass index is 31.96 kg/m². Diet Rx: 4 Carbohydrate Choices per meal .Glucose: 118 (4/2); Last dose of Prednisone also given 4/2. PO intake % of meals. Please see nutrition note for details.     SOCIAL WORK ASSESSMENT  Assessment: Spouse   Pre-Admission Status:  Lives With: Spouse  Type of Home: House [facility])  Home Layout: One level,Laundry in basement (Pt does not go down to basement, daughter does laundry)  Home Access: Stairs to enter without rails  Entrance Stairs - Number of Steps: 1  Bathroom Shower/Tub: Tub/Shower unit,Curtain,Shower chair with back  Bathroom Toilet: Standard  Bathroom Equipment: Shower chair,Grab bars in shower,Hand-held shower,Toilet raiser,Grab bars around toilet  Bathroom Accessibility: Walker accessible  Home Equipment: Hospital bed,Rolling walker,4 wheeled walker,Cane,Lift chair,Grab bars,Reacher,Sock aid (pt states she needs new sock aid)  Receives Help From: Family  ADL Assistance: Needs assistance (daughter A with shower t/f 2x/week, IND dressing/toileting)  Homemaking Assistance: Needs assistance (daughter does laundry, pt helps with meals)  Homemaking Responsibilities: Yes ( grocery shops)  Ambulation Assistance: Independent (uses RW)  Transfer Assistance: Needs assistance ( lift chair, A with stairs/transfers to shower)  Active : No  Patient's  Info: family  Occupation: Retired  Type of occupation: hairdresser  IADL Comments: sleeps in lift chair  Additional Comments:  is retired and assists prn - hx of CABG. Daughter works full time - assists with pt 2x/week on medications, laundry, etc. Lives close by. Family Education: Family Education Completed    Percentage Risk for Readmission: Moderate 19% - 27%   Readmission Risk              Risk of Unplanned Readmission:  21       %    Critical Items: None       Problem / Barrier Intervention / Plan  Results   Impaired function related to debility, B LE weakness, L LE > R LE, multiple falls at home Strengthening, functional mobility training with assistive device. Altered ability to care for self  Training in use of DME/AE and modified care strategies to support self care performance                                 Total Self Care Score    Total Mobility Score  Admission Score:  23      Admission Score:  29  Goal:  41/42         Goal:  62/90   `  Discharge Plan   Estimated Discharge Date: 4/8/22  Home evaluation needed?  Home Evaluation Indication (NO, Requires ReEval, YES/Date): No home evaluation need indicated for patient at this time  Overnight or Day Pass: No  Factors facilitating achievement of predicted outcomes: Family support, Motivated, Cooperative and Pleasant  Barriers to the achievement of predicted outcomes: Decreased endurance, Medical complications, Skin Care and Medication N/A  Nurse: Andreas Mares RN    Dietary/Nutrition: Manuela Antunez RD, LD  Pastoral Care: Nba Cerda  CMG: Armando Thompson RN    I approve the established interdisciplinary plan of care as documented within the medical record of Cam Garay.     Carlitos Hendrix MD

## 2022-04-04 NOTE — PROGRESS NOTES
Physical Therapy  Facility/Department: [facility] ACUTE REHAB  Daily Treatment Note  NAME: Arlet Wesley  : 1950  MRN: [id number]    Date of Service: 2022    Discharge Recommendations:  Patient would benefit from continued therapy after discharge   PT Equipment Recommendations  Other: Pt has hospital bed, lift chair, rolling walker, and a Rollator at home. Assessment   Body structures, Functions, Activity limitations: Decreased functional mobility ; Decreased ADL status; Decreased ROM; Decreased strength;Decreased endurance;Decreased balance;Decreased posture; Increased pain  Assessment: Pt presents with frequent falls at home, has generalized weakness, and debility, but L LE weaker thant R LE. Pt with decreased balance, decreased motor planning and weakness contributing to her falls. Pt with recent R LE DVT and R LE isswollen at this time. Pt is high fall risk at this time   Treatment Diagnosis: Impaired functional mobility 2* ataxia  Specific instructions for Next Treatment: transfers, amb, balance, standing program  Prognosis: Good  Exam: ROM, MMT, bed mobility, transfers, amb  Clinical Presentation: Pt alert, cooperative, pleasant  PT Education: General Safety;Transfer Training; Adaptive Device Training; Injury Prevention; Functional Mobility Training;Energy Conservation;Precautions  Patient Education: Family Training with pt's , Nicole Heart: focused on posture and transfer education/practice, pt plans to use commons everyday occurrences (turning a page while reading, starting a level on her game, someone calling her name or talking to her) as cues/reminders to assess her posture, throughout the day  Barriers to Learning: none  REQUIRES PT FOLLOW UP: Yes  Activity Tolerance  Activity Tolerance: Patient Tolerated treatment well;Patient limited by pain     Patient Diagnosis(es): There were no encounter diagnoses.      has a past medical history of Allergic rhinitis, cause unspecified, Back pain, Bowel obstruction ([facility] [address] C. difficile diarrhea, CAD (coronary artery disease), Cardiac murmur, Cellulitis, Cellulitis, Cerebral artery occlusion with cerebral infarction ([address] COVID-19, Diverticulosis of colon (without mention of hemorrhage), GERD (gastroesophageal reflux disease), GERD (gastroesophageal reflux disease), History of blood transfusion, History of CHF (congestive heart failure), History of MI (myocardial infarction), History of ovarian cyst, History of peritonitis, HTN (hypertension), Hx of blood clots, Hyperlipidemia, Intestinal or peritoneal adhesions with obstruction (postoperative) (postinfection) ([address] Kidney infection, Lateral epicondylitis  of elbow, MDRO (multiple drug resistant organisms) resistance, Muscle strain, Other abnormal glucose, PONV (postoperative nausea and vomiting), Pre-diabetes, Restless legs syndrome (RLS), Snores, 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[address] TIA (transient ischemic attack), Uses walker, Vitamin D deficiency, Wears glasses, and Wellness examination. has a past surgical history that includes Ovary removal (1970); colectomy (3848); Appendectomy (1968); Ovary removal (1971); Hysterectomy (1973); Bunionectomy (Left); sinus surgery (2004); Colonoscopy; other surgical history (08/14/2014); cyst removal (Right); Wrist fracture surgery (Left, 03/05/2019); Upper gastrointestinal endoscopy (N/A, 05/31/2019); Upper gastrointestinal endoscopy (N/A, 08/05/2019); Upper gastrointestinal endoscopy (N/A, 08/23/2019); Abdomen surgery (1976); lumbar fusion (N/A, 02/10/2020); Cardiac catheterization; Cardiac catheterization (2005); [place] tooth extraction; lumbar fusion (N/A, 06/17/2020); back surgery; cervical fusion (05/21/2021); and cervical fusion (N/A, 5/21/2021).     Restrictions  Restrictions/Precautions  Restrictions/Precautions: General Precautions,Fall Risk  Required Braces or Orthoses?: No  Implants present? : Metal implants (cervical and lumbar surgeries, L wrist ORIF)  Position Activity Restriction  Other position/activity restrictions: up as tolerated; O2 3L/m via NC     Subjective   General  Chart Reviewed: Yes  Additional Pertinent Hx: TIA  Response To Previous Treatment: Patient with no complaints from previous session. Family / Caregiver Present: Yes (pt's , Daysi Do)  Referring Practitioner: Dr Solis Loaiza. Subjective  Subjective: Pt reported that she wants to stay at the ARU longer , She does not want to just \"go home and exist.\" She wants to improve her mobility more, before leaving. General Comment  Comments: SpO2 95-96% with O2 via NC, 2L/min  Pain Screening  Patient Currently in Pain: Yes  Pain Assessment  Pain Assessment: Faces  Stanley-Baker Pain Rating: Hurts even more  Pain Location: Neck  Pain Orientation: Posterior  Pain Descriptors: Sore  Pain Frequency: Continuous  Pain Onset: Gradual  Non-Pharmaceutical Pain Intervention(s): Repositioned;Rest;Ambulation/Increased Activity; Distraction; Emotional support  Response to Pain Intervention: None;Patient Satisfied  Multiple Pain Sites: Yes  Pain 2  Pain Rating 2: 6  Pain Location 2: Shoulder  Pain Orientation 2: Left;Mid;Upper  Non-Pharmaceutical Pain Intervention(s) 2: Repositioned;Rest;Distraction;Elevation  Vital Signs  Patient Currently in Pain: Yes       Orientation  Orientation  Overall Orientation Status: Within Normal Limits      Objective   Bed mobility  Rolling to Left: Minimal assistance  Rolling to Right: Minimal assistance  Supine to Sit: Minimal assistance (A trunk in upright position )  Sit to Supine:  Moderate assistance (A BLEs )  Scooting: Minimal assistance  Comment: MOD A to return to bed for BLE management due to increased pain and fatigue  Transfers  Sit to Stand: Contact guard assistance;Minimal Assistance (w/VC's for technique)  Stand to sit: Contact guard assistance;Minimal Assistance (vc's for technique)  Bed to Chair: Contact guard assistance;Minimal assistance (w/VC's for technique)  Stand Pivot Transfers: Contact guard assistance (w/RW)  Comment: Pt tends to push backwards and relies on support from surface that she is standing up from on back of legs. Does much better when cued to scoot forward to edge of chair/bed, position her feet, and to bring her \"nose over her toes\"  Ambulation  Ambulation?: Yes  Ambulation 1  Surface: level tile  Device: Rolling Walker  Assistance: Contact guard assistance  Quality of Gait: slow alicia with step to pattern, increase tone in LLE causing decrease knee flexion and flat foot LE at contact with amb, decreased stance on L LE; downward gaze and cervical flexion -cues to correct all (slight drift to her L)  Gait Deviations: Slow Alicia;Decreased step length;Decreased step height  Distance: 140ft  Comments: SPO2 96% on 2L/min  Stairs/Curb  Stairs?: Yes  Stairs  # Steps : 10  Stairs Height:  (4\"/6\")  Rails: Bilateral  Device: No Device  Assistance: Contact guard assistance  Comment: Pt able to verbalized and demonstrate correct step to sequencing. CGA. Slight circumduction of LLE with ascending steps. VC's to correct     Balance  Posture: Fair  Sitting - Static: Good  Sitting - Dynamic: Fair  Standing - Static: Fair;+  Standing - Dynamic: Fair  Comments: Standing with RW. Other exercises  Other exercises?: Yes  Other exercises 1: Seated Ex: RLE x10  Other exercises 3: Standing Ex: RLE x10  Other exercises 8: Family Training with pt's , Georges David term goals  Time Frame for Short term goals: 1 week  Short term goal 1: Pt to demostrate bed mobility CGA/Jennifer. Short term goal 2: Pt to perform transfers CGA. Short term goal 3: Pt to amb 100'-150' with device, CGA. Short term goal 4: Pt to improve BLE strength by 1/2 MMG. Short term goal 5: Pt to improve standing balance to SANDJORD. Short term goal 6: Pt able to perform 4\" and 6\" steps x3 in //bars or acute stair way, with 2 B UE support, min A  Long term goals  Time Frame for Long term goals :  By DC  Long term goal 1: Pt able to perform bed mobility at SBA  Long term goal 2: Pt able to transfer at supervsion level. Long term goal 3:  Pt able to ambulate house hold distances with assistive device mod-I  Long term goal 4:  Pt able to ambulate distance of 150 ft, level surfaces and shorter distance outside terraine at SBA. Long term goal 5: Pt able to propel w/c on level surface, distance of 150 ft, supervsion level. Long term goal 6: Improve 2MWT distance to atleast 120 ft to improve overall fucntion. Long term goal 7: Pt to improve Tinetti balance score to atlest 20/28 to reduce fall risk. Long term goal 8: Pt able to perform curb step with B UE support and/or donny to goup and down 4 to 5 steps with 2 rails at min A   Patient Goals   Patient goals : To get stronger    Plan    Plan  Times per week: 1.5 hr/day, 5 to 7 days/week. Specific instructions for Next Treatment: transfers, amb, balance, standing program  Current Treatment Recommendations: Strengthening,Balance Training,Functional Mobility Training,Transfer Training,Endurance Training,Gait Training,Equipment Evaluation, Education, & procurement,Patient/Caregiver Education & Training,Safety Education & Training,Stair training,Wheelchair Mobility Training  Safety Devices  Type of devices:  All fall risk precautions in place,Gait belt,Left in chair     Therapy Time     04/04/22 1012 04/04/22 1330   PT Individual Minutes   Time In 1012 1330   Time Out 1108 1405   Minutes [address] Luis Fallen, PTA

## 2022-04-04 NOTE — PLAN OF CARE
Problem: Skin Integrity:  Goal: Will show no infection signs and symptoms  Description: Will show no infection signs and symptoms  4/4/2022 0443 by Kirstin Resendez RN  Outcome: Met This Shift     Problem: Skin Integrity:  Goal: Absence of new skin breakdown  Description: Absence of new skin breakdown  4/4/2022 0443 by Kirstin Resendez RN  Outcome: Met This Shift  Note: Skin assessment completed this shift. Nutrition and Hydration status assessed with adequate intake. Roger Score as charted. Bilateral heels remain elevated on pillows throughout the shift. Patient able to reposition self for comfort and to prevent breakdown. Patient verbalizes understanding of pressure ulcer prevention measures. Skin integrity maintained. No new skin breakdown noted. Skin to high risk pressure areas including coccyx and heels are clear. Problem: Falls - Risk of:  Goal: Will remain free from falls  Description: Will remain free from falls  4/4/2022 0443 by Kirstin Resendez RN  Note: No falls or injuries sustained at this time. No attempts to get out of bed without nursing assistance. Call light within reach and pt. uses appropriately for assistance. Siderails up x 2. Nonskid footwear remains on. Bed in low and locked position. Hourly nursing rounds made. Pt. Alert and oriented, aware of limitations, and exhibits good safety judgement. Pt. uses assistive devices appropriately. Pt. understands individual fall risk factors. Pt. room located close to nurse's station. Bed alarm / Personal alarm remains engaged throughout the shift as a precaution.        Problem: Falls - Risk of:  Goal: Absence of physical injury  Description: Absence of physical injury  Outcome: Ongoing     Problem: Mobility - Impaired:  Goal: Mobility will improve  Description: Mobility will improve  Outcome: Ongoing     Problem: Musculor/Skeletal Functional Status  Goal: Highest potential functional level  Outcome: Ongoing     Problem: Musculor/Skeletal Functional Status  Goal: Absence of falls  Outcome: Ongoing

## 2022-04-04 NOTE — PLAN OF CARE
Problem: Skin Integrity:  Goal: Will show no infection signs and symptoms  Description: Will show no infection signs and symptoms  4/4/2022 1416 by Vasile Singh RN  Outcome: Ongoing  Note: No signs of increased skin or tissue breakdown is noted. See Head to Toe/LDA assessments in flowsheets. Problem: Falls - Risk of:  Goal: Will remain free from falls  Description: Will remain free from falls  4/4/2022 1416 by Vasile Singh RN  Outcome: Ongoing  Note: Patient remains free of falls and injuries throughout shift. Bed remains in the lowest position, wheels locked, call light and bedside table are within reach. Problem: Pain:  Goal: Pain level will decrease  Description: Pain level will decrease  Outcome: Ongoing  Note: Patient's pain is well controlled with current regimen. See MAR.       Problem: Mobility - Impaired:  Goal: Mobility will improve  Description: Mobility will improve  4/4/2022 1416 by Vasile Singh RN  Outcome: Ongoing     Problem: Musculor/Skeletal Functional Status  Goal: Highest potential functional level  Outcome: Ongoing     Problem: Nutrition  Goal: Optimal nutrition therapy  Outcome: Ongoing

## 2022-04-04 NOTE — PROGRESS NOTES
Physical Medicine & Rehabilitation  Progress Note      Subjective:      Concetta Ruiz is a 71 y.o. female with cervical myelopathy. She reports doing fine today. She is asking about staying longer in acute rehab - discussed that we will talk about this in team tomorrow. She also notes some congestion and dry nasal passages - discussed adding saline nasal spray as needed. She denies any other acute concerns. ROS:  Denies fevers, chills, sweats. No chest pain, palpitations, lightheadedness. Denies coughing, wheezing or shortness of breath. Denies abdominal pain, nausea, diarrhea or constipation. No new areas of joint pain. Denies new areas of numbness or weakness. Denies new anxiety or depression issues. No new skin problems. Rehabilitation:   Progressing in therapies. PT:  Restrictions/Precautions: General Precautions,Fall Risk  Implants present? : Metal implants (cervical and lumbar surgeries, L wrist ORIF)  Other position/activity restrictions: up as tolerated; O2 3L/m via NC   Transfers  Sit to Stand: Contact guard assistance,Minimal Assistance (w/VC's for technique)  Stand to sit: Contact guard assistance,Minimal Assistance (vc's for technique)  Bed to Chair: Contact guard assistance,Minimal assistance (w/VC's for technique)  Stand Pivot Transfers: Contact guard assistance (w/RW)  Comment: Pt tends to push backwards and relies on support from surface that she is standing up from on back of legs.  Does much better when cued to scoot forward to edge of chair/bed, position her feet, and to bring her \"nose over her toes\"  Ambulation 1  Surface: level tile  Device: Rolling Walker  Other Apparatus: Wheelchair follow  Assistance: Contact guard assistance  Quality of Gait: slow tawnada with step to pattern, increase tone in LLE causing decrease knee flexion and flat foot LE at contact with amb, decreased stance on L LE; downward gaze and cervical flexion -cues to correct all (slight drift to her L)  Gait Deviations: Slow Tawanda,Decreased step length,Decreased step height  Distance: 140ft  Comments: SPO2 96% on 2L/min    Transfers  Sit to Stand: Contact guard assistance,Minimal Assistance (w/VC's for technique)  Stand to sit: Contact guard assistance,Minimal Assistance (vc's for technique)  Bed to Chair: Contact guard assistance,Minimal assistance (w/VC's for technique)  Stand Pivot Transfers: Contact guard assistance (w/RW)  Comment: Pt tends to push backwards and relies on support from surface that she is standing up from on back of legs.  Does much better when cued to scoot forward to edge of chair/bed, position her feet, and to bring her \"nose over her toes\"  Ambulation  Ambulation?: Yes  More Ambulation?: Yes  Ambulation 1  Surface: level tile  Device: Rolling Walker  Other Apparatus: Wheelchair follow  Assistance: Contact guard assistance  Quality of Gait: slow tawanda with step to pattern, increase tone in LLE causing decrease knee flexion and flat foot LE at contact with amb, decreased stance on L LE; downward gaze and cervical flexion -cues to correct all (slight drift to her L)  Gait Deviations: Slow Tawanda,Decreased step length,Decreased step height  Distance: 140ft  Comments: SPO2 96% on 2L/min    Surface: level tile  Ambulation 1  Surface: level tile  Device: Rolling Walker  Other Apparatus: Wheelchair follow  Assistance: Contact guard assistance  Quality of Gait: slow tawanda with step to pattern, increase tone in LLE causing decrease knee flexion and flat foot LE at contact with amb, decreased stance on L LE; downward gaze and cervical flexion -cues to correct all (slight drift to her L)  Gait Deviations: Slow Tawanda,Decreased step length,Decreased step height  Distance: 140ft  Comments: SPO2 96% on 2L/min    OT:  ADL  Equipment Provided: Reacher,Long-handled sponge  Feeding: Setup,Increased time to complete  Grooming: Setup (seated at sinkside; brushed/straighten hair, brushed teeth)  UE Bathing: None (Pt req no need at this time)  LE Bathing: None (Pt req no need at this time)  UE Dressing: Stand by assistance,Verbal cueing,Increased time to complete  LE Dressing: Minimal assistance (A pulling overhips 2* fatigue after increased time threading)  Toileting: Contact guard assistance  Additional Comments: QUINONES facilitated pt in ADLs at the sink this AM. During LE dressing pt req increased time for threading BLEs. Able to complete without reacher this AM.          Balance  Sitting Balance: Supervision  Standing Balance: Contact guard assistance   Standing Balance  Time: AM: <1 minute x4  Activity: functional transfers, LB ADLs  Comment: 1-2 UE support  Functional Mobility  Functional - Mobility Device: Wheelchair  Activity: To/from bathroom  Assist Level: Contact guard assistance  Functional Mobility Comments: Propelled self using BUEs and BLEs. Bed mobility  Rolling to Left: Minimal assistance  Rolling to Right: Minimal assistance  Supine to Sit: Minimal assistance (A trunk in upright position )  Sit to Supine: Moderate assistance (A BLEs )  Scooting: Minimal assistance  Comment: MOD A to return to bed for BLE management due to increased pain and fatigue  Transfers  Stand Pivot Transfers: Contact guard assistance  Sit to stand: Minimal assistance (from recliner)  Stand to sit: Contact guard assistance  Transfer Comments: QUINONES blocked L foot to prevent from slipping. Cued for technique as needed and locking brakes. Toilet Transfers  Toilet - Technique: Stand pivot  Equipment Used: Grab bars  Toilet Transfer: Contact guard assistance  Toilet Transfers Comments: Cued for hand placement this date. Shower Transfers  Shower - Transfer From: Wheelchair  Shower - Transfer Type: To and From  Shower - Transfer To: Transfer tub bench  Shower - Technique: Stand pivot  Shower Transfers: Contact Guard  Shower Transfers Comments: 1-2 UE support; VC's for technique;  Dysem placed under L foot and shoes on pt to prevent L foot slipping ; increased time to complete, No LOB noted   Wheelchair Bed Transfers  Wheelchair/Bed - Technique: Stand pivot  Equipment Used: Other (RW)  Level of Asssistance:  Moderate assistance  Wheelchair Transfers Comments: with RW; Blocking of L foot due to slipping    SPEECH:      Current Medications:   Current Facility-Administered Medications: sodium chloride (OCEAN, BABY AYR) 0.65 % nasal spray 1 spray, 1 spray, Each Nostril, PRN  albuterol (PROVENTIL) nebulizer solution 2.5 mg, 2.5 mg, Nebulization, Q6H PRN  ipratropium-albuterol (DUONEB) nebulizer solution 1 ampule, 1 ampule, Inhalation, 4x daily  losartan (COZAAR) tablet 100 mg, 100 mg, Oral, Daily  amLODIPine (NORVASC) tablet 5 mg, 5 mg, Oral, Daily  guaiFENesin (ROBITUSSIN) 100 MG/5ML liquid 100 mg, 100 mg, Oral, BID PRN  traMADol (ULTRAM) tablet 50 mg, 50 mg, Oral, Q6H PRN  gabapentin (NEURONTIN) capsule 200 mg, 200 mg, Oral, BID  melatonin tablet 6 mg, 6 mg, Oral, Nightly PRN  lidocaine 4 % external patch 1 patch, 1 patch, TransDERmal, Daily  busPIRone (BUSPAR) tablet 5 mg, 5 mg, Oral, TID  magnesium hydroxide (MILK OF MAGNESIA) 400 MG/5ML suspension 30 mL, 30 mL, Oral, Daily PRN  apixaban (ELIQUIS) tablet 5 mg, 5 mg, Oral, BID  atorvastatin (LIPITOR) tablet 40 mg, 40 mg, Oral, Nightly  calcium carbonate w/vitamin D (CALTRATE) 600-400 MG-UNIT per tab 1 tablet, 1 tablet, Oral, Daily  carvedilol (COREG) tablet 12.5 mg, 12.5 mg, Oral, BID  citalopram (CELEXA) tablet 20 mg, 20 mg, Oral, Daily  docusate sodium (COLACE) capsule 100 mg, 100 mg, Oral, BID PRN  fenofibrate (TRIGLIDE) tablet 160 mg, 160 mg, Oral, Daily  ferrous sulfate (IRON 325) tablet 325 mg, 325 mg, Oral, BID  furosemide (LASIX) tablet 20 mg, 20 mg, Oral, BID  pramipexole (MIRAPEX) tablet 0.5 mg, 0.5 mg, Oral, Nightly  vitamin B-12 (CYANOCOBALAMIN) tablet 1,000 mcg, 1,000 mcg, Oral, Daily  acetaminophen (TYLENOL) tablet 650 mg, 650 mg, Oral, Q4H PRN  polyethylene glycol (GLYCOLAX) packet 17 g, 17 g, Oral, Daily  senna (SENOKOT) tablet 17.2 mg, 2 tablet, Oral, Daily PRN  bisacodyl (DULCOLAX) suppository 10 mg, 10 mg, Rectal, Daily PRN      Objective:  BP (!) 110/48   Pulse 66   Temp 98.4 °F (36.9 °C)   Resp 18   Ht 5' 3\" (1.6 m)   Wt 180 lb 6.4 oz (81.8 kg)   SpO2 97%   BMI 31.96 kg/m²       GEN: Well developed, well nourished, no acute distress  HEENT: NCAT. EOM grossly intact. Hearing grossly intact. Mucous membranes pink and moist.  RESP: Normal breath sounds with no wheezing, rales, or rhonchi. Respirations WNL and unlabored. On nasal cannula oxygen. CV: Regular rate and rhythm. No murmurs, rubs, or gallops. ABD: Soft, non-distended, BS+ and equal.  NEURO: Alert. Speech fluent. Sensation to light touch intact in bilateral lower limbs. MSK:  Muscle tone and bulk are normal bilaterally. Strength 4+/5 in bilateral lower limbs. LIMBS: No edema in bilateral lower limbs. SKIN: Warm and dry with good turgor. PSYCH: Mood WNL. Affect WNL. Appropriately interactive. Diagnostics:     CBC: No results for input(s): WBC, RBC, HGB, HCT, MCV, RDW, PLT in the last 72 hours. BMP:   Recent Labs     04/02/22  0808      K 4.1      CO2 28   BUN 62*   CREATININE 1.33*   GLUCOSE 118*     BNP: No results for input(s): BNP in the last 72 hours. PT/INR: No results for input(s): PROTIME, INR in the last 72 hours. APTT: No results for input(s): APTT in the last 72 hours. CARDIAC ENZYMES: No results for input(s): CKMB, CKMBINDEX, TROPONINT in the last 72 hours. Invalid input(s): CKTOTAL;3  FASTING LIPID PANEL:  Lab Results   Component Value Date    CHOL 103 05/20/2019    HDL 74 03/12/2021    TRIG 66 05/20/2019     LIVER PROFILE: No results for input(s): AST, ALT, ALB, BILIDIR, BILITOT, ALKPHOS in the last 72 hours. Impression/Plan:   Impaired ADLs, gait, and mobility due to:    1. Cervical myelopathy:  S/p previous cervical decompression. With ataxia.   PT/OT for gait, mobility, strengthening, endurance, ADLs, and self care. Pain control with gabapentin, as-needed tylenol, as-needed tramadol. 2. Suspected costochondritis:  Pain control with as-needed tramadol, as-needed tylenol. 3. Bronchitis:  Completed course of steroids and zithromax. On scheduled duo-neb. Has robitussin as needed. 4. Anemia:  Hemoglobin 10.7 on 3/31, stable. Monitoring. On oral iron supplementation. 5. Lower limb cellulitis:  Resolved. Completed course of keflex. 6. CAD:  On atorvastatin, fenofibrate  7. Chronic diastolic heart failure: On lasix  8. HTN:  On amlodipine, carvedilol, losartan  9. Chronic DVT:  On eliquis  10. Depression: On celexa. IM added buspirone. Patient previously declined psych evaluation. 11. Restless leg syndrome: On pramipexole  12. Nasal congestion, dryness: Added saline nasal spray as needed on 4/4. 13. Bowel Management: Miralax daily, senokot prn, dulcolax prn, docusate BID prn, milk of magnesia prn  14. DVT prophylaxis:  On eliquis  15. Internal Medicine for medical management      Lewis Maldonado is seen in face-to-face evaluation and requires the following durable medical equipment in order to perform ADLs and mobility related ADLs in the home.     BATH/SHOWER SEAT MISC    Bath/Shower Bench    Weight: Weight: 180 lb 6.4 oz (81.8 kg)  Diagnosis: Cervical myelopathy  Duration: Purchase        Electronically signed by Mayco Johnson MD on 4/5/2022 at 12:15 AM

## 2022-04-05 PROCEDURE — 6370000000 HC RX 637 (ALT 250 FOR IP): Performed by: PHYSICAL MEDICINE & REHABILITATION

## 2022-04-05 PROCEDURE — 6370000000 HC RX 637 (ALT 250 FOR IP): Performed by: NURSE PRACTITIONER

## 2022-04-05 PROCEDURE — 6370000000 HC RX 637 (ALT 250 FOR IP): Performed by: INTERNAL MEDICINE

## 2022-04-05 PROCEDURE — 99232 SBSQ HOSP IP/OBS MODERATE 35: CPT | Performed by: INTERNAL MEDICINE

## 2022-04-05 PROCEDURE — 2500000003 HC RX 250 WO HCPCS: Performed by: PHYSICAL MEDICINE & REHABILITATION

## 2022-04-05 PROCEDURE — 2700000000 HC OXYGEN THERAPY PER DAY

## 2022-04-05 PROCEDURE — 97535 SELF CARE MNGMENT TRAINING: CPT

## 2022-04-05 PROCEDURE — 97530 THERAPEUTIC ACTIVITIES: CPT

## 2022-04-05 PROCEDURE — 97116 GAIT TRAINING THERAPY: CPT

## 2022-04-05 PROCEDURE — 94761 N-INVAS EAR/PLS OXIMETRY MLT: CPT

## 2022-04-05 PROCEDURE — 97110 THERAPEUTIC EXERCISES: CPT

## 2022-04-05 PROCEDURE — 1180000000 HC REHAB R&B

## 2022-04-05 PROCEDURE — 94640 AIRWAY INHALATION TREATMENT: CPT

## 2022-04-05 RX ADMIN — TRAMADOL HYDROCHLORIDE 50 MG: 50 TABLET, COATED ORAL at 20:40

## 2022-04-05 RX ADMIN — CYANOCOBALAMIN TAB 1000 MCG 1000 MCG: 1000 TAB at 07:46

## 2022-04-05 RX ADMIN — AMLODIPINE BESYLATE 5 MG: 5 TABLET ORAL at 07:45

## 2022-04-05 RX ADMIN — LOSARTAN POTASSIUM 100 MG: 100 TABLET, FILM COATED ORAL at 07:45

## 2022-04-05 RX ADMIN — Medication 6 MG: at 20:41

## 2022-04-05 RX ADMIN — IPRATROPIUM BROMIDE AND ALBUTEROL SULFATE 1 AMPULE: 2.5; .5 SOLUTION RESPIRATORY (INHALATION) at 08:03

## 2022-04-05 RX ADMIN — CITALOPRAM HYDROBROMIDE 20 MG: 20 TABLET ORAL at 07:45

## 2022-04-05 RX ADMIN — FUROSEMIDE 20 MG: 20 TABLET ORAL at 17:05

## 2022-04-05 RX ADMIN — GABAPENTIN 200 MG: 100 CAPSULE ORAL at 20:40

## 2022-04-05 RX ADMIN — CARVEDILOL 12.5 MG: 12.5 TABLET, FILM COATED ORAL at 20:41

## 2022-04-05 RX ADMIN — CARVEDILOL 12.5 MG: 12.5 TABLET, FILM COATED ORAL at 07:46

## 2022-04-05 RX ADMIN — BUSPIRONE HYDROCHLORIDE 5 MG: 5 TABLET ORAL at 07:51

## 2022-04-05 RX ADMIN — BUSPIRONE HYDROCHLORIDE 5 MG: 5 TABLET ORAL at 13:37

## 2022-04-05 RX ADMIN — IPRATROPIUM BROMIDE AND ALBUTEROL SULFATE 1 AMPULE: 2.5; .5 SOLUTION RESPIRATORY (INHALATION) at 16:06

## 2022-04-05 RX ADMIN — GUAIFENESIN 100 MG: 200 SOLUTION ORAL at 20:40

## 2022-04-05 RX ADMIN — FUROSEMIDE 20 MG: 20 TABLET ORAL at 07:46

## 2022-04-05 RX ADMIN — GABAPENTIN 200 MG: 100 CAPSULE ORAL at 07:45

## 2022-04-05 RX ADMIN — APIXABAN 5 MG: 5 TABLET, FILM COATED ORAL at 07:45

## 2022-04-05 RX ADMIN — IPRATROPIUM BROMIDE AND ALBUTEROL SULFATE 1 AMPULE: 2.5; .5 SOLUTION RESPIRATORY (INHALATION) at 19:39

## 2022-04-05 RX ADMIN — PRAMIPEXOLE DIHYDROCHLORIDE 0.5 MG: 0.25 TABLET ORAL at 20:41

## 2022-04-05 RX ADMIN — APIXABAN 5 MG: 5 TABLET, FILM COATED ORAL at 20:40

## 2022-04-05 RX ADMIN — TRAMADOL HYDROCHLORIDE 50 MG: 50 TABLET, COATED ORAL at 02:23

## 2022-04-05 RX ADMIN — BUSPIRONE HYDROCHLORIDE 5 MG: 5 TABLET ORAL at 20:46

## 2022-04-05 RX ADMIN — ATORVASTATIN CALCIUM 40 MG: 40 TABLET, FILM COATED ORAL at 20:40

## 2022-04-05 RX ADMIN — GUAIFENESIN 100 MG: 200 SOLUTION ORAL at 06:39

## 2022-04-05 RX ADMIN — FENOFIBRATE 160 MG: 160 TABLET ORAL at 07:46

## 2022-04-05 RX ADMIN — FERROUS SULFATE TAB 325 MG (65 MG ELEMENTAL FE) 325 MG: 325 (65 FE) TAB at 20:40

## 2022-04-05 RX ADMIN — IPRATROPIUM BROMIDE AND ALBUTEROL SULFATE 1 AMPULE: 2.5; .5 SOLUTION RESPIRATORY (INHALATION) at 12:17

## 2022-04-05 RX ADMIN — CALCIUM CARBONATE 600 MG (1,500 MG)-VITAMIN D3 400 UNIT TABLET 1 TABLET: at 07:46

## 2022-04-05 RX ADMIN — FERROUS SULFATE TAB 325 MG (65 MG ELEMENTAL FE) 325 MG: 325 (65 FE) TAB at 07:45

## 2022-04-05 ASSESSMENT — PAIN DESCRIPTION - ORIENTATION
ORIENTATION: ANTERIOR
ORIENTATION: ANTERIOR

## 2022-04-05 ASSESSMENT — PAIN DESCRIPTION - PAIN TYPE
TYPE: CHRONIC PAIN
TYPE: ACUTE PAIN
TYPE: ACUTE PAIN

## 2022-04-05 ASSESSMENT — PAIN DESCRIPTION - LOCATION
LOCATION: STERNUM
LOCATION: STERNUM
LOCATION: CHEST

## 2022-04-05 ASSESSMENT — PAIN DESCRIPTION - DESCRIPTORS
DESCRIPTORS: SORE
DESCRIPTORS: SORE

## 2022-04-05 ASSESSMENT — PAIN SCALES - GENERAL
PAINLEVEL_OUTOF10: 0
PAINLEVEL_OUTOF10: 5
PAINLEVEL_OUTOF10: 5
PAINLEVEL_OUTOF10: 0
PAINLEVEL_OUTOF10: 5
PAINLEVEL_OUTOF10: 5

## 2022-04-05 ASSESSMENT — PAIN DESCRIPTION - FREQUENCY
FREQUENCY: CONTINUOUS
FREQUENCY: CONTINUOUS

## 2022-04-05 ASSESSMENT — PAIN SCALES - WONG BAKER: WONGBAKER_NUMERICALRESPONSE: 6

## 2022-04-05 ASSESSMENT — PAIN DESCRIPTION - ONSET
ONSET: ON-GOING
ONSET: ON-GOING

## 2022-04-05 NOTE — PROGRESS NOTES
Physical Therapy  [facility]: SAROJ HINTON  Acute Rehabilitation Physical Therapy Progress Note    Date: 22  Patient Name: Sophie Jacques       Room: [facility]-  MRN: [id number]   Account: [de-identified]   : 1950  (76 y.o.) Gender: female     Referring Practitioner: Marjorie Hinton MD  Diagnosis: Cervical myelopathy  Past Medical History:  has a past medical history of Allergic rhinitis, cause unspecified, Back pain, Bowel obstruction ([address] C. difficile diarrhea, CAD (coronary artery disease), Cardiac murmur, Cellulitis, Cellulitis, Cerebral artery occlusion with cerebral infarction ([address] COVID-19, Diverticulosis of colon (without mention of hemorrhage), GERD (gastroesophageal reflux disease), GERD (gastroesophageal reflux disease), History of blood transfusion, History of CHF (congestive heart failure), History of MI (myocardial infarction), History of ovarian cyst, History of peritonitis, HTN (hypertension), Hx of blood clots, Hyperlipidemia, Intestinal or peritoneal adhesions with obstruction (postoperative) (postinfection) ([address] Kidney infection, Lateral epicondylitis  of elbow, MDRO (multiple drug resistant organisms) resistance, Muscle strain, Other abnormal glucose, PONV (postoperative nausea and vomiting), Pre-diabetes, Restless legs syndrome (RLS), Snores, 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[address] TIA (transient ischemic attack), Uses walker, Vitamin D deficiency, Wears glasses, and Wellness examination. Past Surgical History:   has a past surgical history that includes Ovary removal (); colectomy (); Appendectomy (); Ovary removal (); Hysterectomy (); Bunionectomy (Left); sinus surgery (); Colonoscopy; other surgical history (2014); cyst removal (Right); Wrist fracture surgery (Left, 2019); Upper gastrointestinal endoscopy (N/A, 2019); Upper gastrointestinal endoscopy (N/A, 2019);  Upper gastrointestinal endoscopy (N/A, 08/23/2019); Abdomen surgery (1976); lumbar fusion (N/A, 02/10/2020); Cardiac catheterization; Cardiac catheterization (2005); [place] tooth extraction; lumbar fusion (N/A, 06/17/2020); back surgery; cervical fusion (05/21/2021); and cervical fusion (N/A, 5/21/2021). Additional Pertinent Hx: TIA    Overall Orientation Status: Within Normal Limits  Restrictions/Precautions  Restrictions/Precautions: General Precautions; Fall Risk  Required Braces or Orthoses?: No  Implants present? : Metal implants (cervical and lumbar surgeries, L wrist ORIF)  Position Activity Restriction  Other position/activity restrictions: up as tolerated; O2 3L/m via NC    Subjective: Patient asking to stay longer in [address] [place] [address]. Patient did get approved for additional days. Comments: Vitals WNL on 1 L O2 in AM and room air in PM.    Vital Signs  Patient Currently in Pain: Yes  Stanley-Seay Pain Rating: Hurts even more     Oxygen Therapy  SpO2: 96 %  O2 Device: None (Room air) (during PM session.  )          Bed Mobility:   Bed Mobility  Supine to Sit: Contact guard assistance  Sit to Supine: Contact guard assistance  Scooting: Contact guard assistance  Bed mobility  Scooting: Contact guard assistance    Transfers:  Sit to Stand: Contact guard assistance;Minimal Assistance (w/VC's for technique)  Stand to sit: Contact guard assistance;Minimal Assistance (vc's for technique)  Bed to Chair: Contact guard assistance;Minimal assistance (w/VC's for technique)              Ambulation 1  Surface: level tile  Device: Rolling Walker  Assistance: Contact guard assistance  Quality of Gait: slow tawanda with step to pattern, increase tone in LLE causing decrease knee flexion and flat foot LE at contact with amb, decreased stance on L LE; downward gaze and cervical flexion -cues to correct all (slight drift to her L)  Gait Deviations: Slow Tawanda;Decreased step length;Decreased step height  Distance: 165 ft;  short distances within gym.  (100 ft  2MWT)  Comments: SpO2 WNL on 1 L O2 in AM and room air PM.        Stairs/Curb  Stairs?: Yes  Stairs  # Steps : 10  Stairs Height:  (4\"/6\")  Rails: Bilateral  Device: No Device  Assistance: Contact guard assistance  Comment: Pt able to verbalized and demonstrate correct step to sequencing. CGA. Slight circumduction of LLE with ascending steps. VC's to correct         BALANCE Posture: Fair  Sitting - Static: Good  Sitting - Dynamic: Fair  Standing - Static: Fair;+  Standing - Dynamic: Fair  Comments: Standing with RW. EXERCISES    Other exercises?: Yes  Other exercises 1: Seated Ex: RLE x10  Other exercises 2: NuStep x 10 mins. Other exercises 3: Standing Ex: RLE x10  Other exercises 4: 2MWT  100 ft           Activity Tolerance: Patient Tolerated treatment well,Patient limited by pain  PT Equipment Recommendations  Other: Pt has hospital bed, lift chair, rolling walker, and a Rollator at home. Current Treatment Recommendations: Strengthening,Balance Training,Functional Mobility Training,Transfer Training,Endurance Training,Gait Training,Equipment Evaluation, Education, & procurement,Patient/Caregiver Education & Training,Safety Education & Training,Stair training,Wheelchair Mobility Training    Conditions Requiring Skilled Therapeutic Intervention  Body structures, Functions, Activity limitations: Decreased functional mobility ; Decreased ADL status; Decreased ROM; Decreased strength;Decreased endurance;Decreased balance;Decreased posture; Increased pain  Treatment Diagnosis: Impaired functional mobility 2* ataxia  Prognosis: Good  Barriers to Learning: none  REQUIRES PT FOLLOW UP: Yes  Discharge Recommendations: Patient would benefit from continued therapy after discharge    Goals  Short term goals  Time Frame for Short term goals: 1 week  Short term goal 1: Pt to demostrate bed mobility CGA/Jennifer. Short term goal 2: Pt to perform transfers CGA. Short term goal 3: Pt to amb 100'-150' with device, CGA.   Short term goal 4: Pt to improve BLE strength by 1/2 MMG. Short term goal 5: Pt to improve standing balance to [facility]. Short term goal 6: Pt able to perform 4\" and 6\" steps x3 in //bars or acute stair way, with 2 B UE support, min A  Long term goals  Time Frame for Long term goals : By DC  Long term goal 1: Pt able to perform bed mobility at [facility]  Long term goal 2: Pt able to transfer at supervsion level. Long term goal 3:  Pt able to ambulate house hold distances with assistive device mod-I  Long term goal 4:  Pt able to ambulate distance of 150 ft, level surfaces and shorter distance outside terraine at [facility]. Long term goal 5: Pt able to propel w/c on level surface, distance of 150 ft, supervsion level. Long term goal 6: Improve 2MWT distance to atleast 120 ft to improve overall fucntion. Long term goal 7: Pt to improve Tinetti balance score to atlest 20/28 to reduce fall risk.    Long term goal 8: Pt able to perform curb step with B UE support and/or donny to goup and down 4 to 5 steps with 2 rails at min A        04/05/22 1030 04/05/22 1525   PT Individual Minutes   Time In 1030 1525   Time Out 1110 1615   Minutes 40 50       Electronically signed by Kirill Wallace PTA on 4/5/22 at 5:19 PM EDT

## 2022-04-05 NOTE — PROGRESS NOTES
Physical Medicine & Rehabilitation  Progress Note      Subjective:      Anthony Schrader is a 71 y.o. female with cervical myelopathy. She reports doing fine today. Discussed change of discharge date to Friday in order to work on additional skills prior to going home. She states that her breathing is better overall. She denies any acute concerns. ROS:  Denies fevers, chills, sweats. No chest pain, palpitations, lightheadedness. Denies coughing, wheezing or shortness of breath. Denies abdominal pain, nausea, diarrhea or constipation. No new areas of joint pain. Denies new areas of numbness or weakness. Denies new anxiety or depression issues. No new skin problems. Rehabilitation:   Progressing in therapies. PT:  Restrictions/Precautions: General Precautions,Fall Risk  Implants present? : Metal implants (cervical and lumbar surgeries, L wrist ORIF)  Other position/activity restrictions: up as tolerated; O2 3L/m via NC   Transfers  Sit to Stand: Contact guard assistance,Minimal Assistance (w/VC's for technique)  Stand to sit: Contact guard assistance,Minimal Assistance (vc's for technique)  Bed to Chair: Contact guard assistance,Minimal assistance (w/VC's for technique)  Stand Pivot Transfers: Contact guard assistance (w/RW)  Comment: Pt tends to push backwards and relies on support from surface that she is standing up from on back of legs.  Does much better when cued to scoot forward to edge of chair/bed, position her feet, and to bring her \"nose over her toes\"  Ambulation 1  Surface: level tile  Device: Rolling Walker  Other Apparatus: Wheelchair follow  Assistance: Contact guard assistance  Quality of Gait: slow tawanda with step to pattern, increase tone in LLE causing decrease knee flexion and flat foot LE at contact with amb, decreased stance on L LE; downward gaze and cervical flexion -cues to correct all (slight drift to her L)  Gait Deviations: Slow Tawanda,Decreased step length,Decreased step height  Distance: 165 ft;  short distances within gym. (100 ft  2MWT)  Comments: SpO2 WNL on 1 L O2 in AM and room air PM.    Transfers  Sit to Stand: Contact guard assistance,Minimal Assistance (w/VC's for technique)  Stand to sit: Contact guard assistance,Minimal Assistance (vc's for technique)  Bed to Chair: Contact guard assistance,Minimal assistance (w/VC's for technique)  Stand Pivot Transfers: Contact guard assistance (w/RW)  Comment: Pt tends to push backwards and relies on support from surface that she is standing up from on back of legs. Does much better when cued to scoot forward to edge of chair/bed, position her feet, and to bring her \"nose over her toes\"  Ambulation  Ambulation?: Yes  More Ambulation?: Yes  Ambulation 1  Surface: level tile  Device: Rolling Walker  Other Apparatus: Wheelchair follow  Assistance: Contact guard assistance  Quality of Gait: slow tawanda with step to pattern, increase tone in LLE causing decrease knee flexion and flat foot LE at contact with amb, decreased stance on L LE; downward gaze and cervical flexion -cues to correct all (slight drift to her L)  Gait Deviations: Slow Tawanda,Decreased step length,Decreased step height  Distance: 165 ft;  short distances within gym. (100 ft  2MWT)  Comments: SpO2 WNL on 1 L O2 in AM and room air PM.    Surface: level tile  Ambulation 1  Surface: level tile  Device: Rolling Walker  Other Apparatus: Wheelchair follow  Assistance: Contact guard assistance  Quality of Gait: slow tawanda with step to pattern, increase tone in LLE causing decrease knee flexion and flat foot LE at contact with amb, decreased stance on L LE; downward gaze and cervical flexion -cues to correct all (slight drift to her L)  Gait Deviations: Slow Tawanda,Decreased step length,Decreased step height  Distance: 165 ft;  short distances within gym.  (100 ft  2MWT)  Comments: SpO2 WNL on 1 L O2 in AM and room air PM.    OT:  ADL  Equipment Provided: Reacher,Long-handled sponge  Feeding: Modified independent  (Per pt report)  Grooming: Modified independent  (seated at sink)  UE Bathing: Stand by assistance  LE Bathing: Stand by assistance (weight shifting for washing buttocks)  UE Dressing: Modified independent   LE Dressing: Contact guard assistance,Increased time to complete (CGA threading BLEs with reacher, SBA pulling over hips)  Toileting: Stand by assistance  Additional Comments: QUINONES facilitated pt in full shower this AM seated on tub bench. Pt demos with good use of long handed sponge for washing feet, weight shifting for washing buttocks. Then transfers into w/c for dressing tasks. ; PM: QUINONES provided pt with BSC for ease and max I with toileting. Balance  Sitting Balance: Supervision  Standing Balance: Contact guard assistance   Standing Balance  Time: 30-60 sec X8, 7 min  Activity: functional mobility/transfers, ADLs, tabletop task  Comment: 1-2 UE support  Functional Mobility  Functional - Mobility Device: Rolling Walker  Activity: Other (few feet to/fom w/c)  Assist Level: Contact guard assistance  Functional Mobility Comments: Propelled self using BUEs and BLEs. Bed mobility  Rolling to Left: Minimal assistance  Rolling to Right: Minimal assistance  Supine to Sit: Minimal assistance (A trunk in upright position )  Sit to Supine: Moderate assistance (A BLEs )  Scooting: Contact guard assistance  Comment: MOD A to return to bed for BLE management due to increased pain and fatigue  Transfers  Stand Pivot Transfers: Contact guard assistance  Sit to stand: Minimal assistance (from recliner)  Stand to sit: Contact guard assistance  Transfer Comments: QUINONES blocked L foot to prevent from slipping. Cued for technique as needed and locking brakes.     Toilet Transfers  Toilet - Technique: Stand pivot  Equipment Used: Grab bars  Toilet Transfer: Stand by assistance  Toilet Transfers Comments: good safety noted     Shower Transfers  Shower - BID  furosemide (LASIX) tablet 20 mg, 20 mg, Oral, BID  pramipexole (MIRAPEX) tablet 0.5 mg, 0.5 mg, Oral, Nightly  vitamin B-12 (CYANOCOBALAMIN) tablet 1,000 mcg, 1,000 mcg, Oral, Daily  acetaminophen (TYLENOL) tablet 650 mg, 650 mg, Oral, Q4H PRN  polyethylene glycol (GLYCOLAX) packet 17 g, 17 g, Oral, Daily  senna (SENOKOT) tablet 17.2 mg, 2 tablet, Oral, Daily PRN  bisacodyl (DULCOLAX) suppository 10 mg, 10 mg, Rectal, Daily PRN      Objective:  /65   Pulse 69   Temp 98 °F (36.7 °C) (Oral)   Resp 18   Ht 5' 3\" (1.6 m)   Wt 180 lb 6.4 oz (81.8 kg)   SpO2 95%   BMI 31.96 kg/m²       GEN: Well developed, well nourished, no acute distress  HEENT: NCAT. EOM grossly intact. Hearing grossly intact. Mucous membranes pink and moist.  RESP: Normal breath sounds with no wheezing, rales, or rhonchi. Respirations WNL and unlabored. On nasal cannula oxygen. CV: Regular rate and rhythm. No murmurs, rubs, or gallops. ABD: Soft, non-distended, BS+ and equal.  NEURO: Alert. Speech fluent. Sensation to light touch intact in bilateral lower limbs. MSK:  Muscle tone and bulk are normal bilaterally. Strength 4+/5 in bilateral lower limbs. LIMBS: No edema in bilateral lower limbs. SKIN: Warm and dry with good turgor. PSYCH: Mood WNL. Affect WNL. Appropriately interactive. Diagnostics:     CBC: No results for input(s): WBC, RBC, HGB, HCT, MCV, RDW, PLT in the last 72 hours. BMP:   No results for input(s): NA, K, CL, CO2, PHOS, BUN, CREATININE, CA, GLUCOSE in the last 72 hours. BNP: No results for input(s): BNP in the last 72 hours. PT/INR: No results for input(s): PROTIME, INR in the last 72 hours. APTT: No results for input(s): APTT in the last 72 hours. CARDIAC ENZYMES: No results for input(s): CKMB, CKMBINDEX, TROPONINT in the last 72 hours.     Invalid input(s): CKTOTAL;3  FASTING LIPID PANEL:  Lab Results   Component Value Date    CHOL 103 05/20/2019    HDL 74 03/12/2021    TRIG 66 05/20/2019 LIVER PROFILE: No results for input(s): AST, ALT, ALB, BILIDIR, BILITOT, ALKPHOS in the last 72 hours. Impression/Plan:   Impaired ADLs, gait, and mobility due to:    1. Cervical myelopathy:  S/p previous cervical decompression. With ataxia. PT/OT  for gait, mobility, strengthening, endurance, ADLs, and self care. Pain control with gabapentin, as-needed tylenol, as-needed tramadol. 2. Suspected costochondritis:  Pain control with as-needed tramadol, as-needed tylenol. 3. Bronchitis:  Completed course of steroids and zithromax. On scheduled duo-neb. Has robitussin as needed. 4. Anemia:  Hemoglobin 10.7 on 3/31, stable. Monitoring. On oral iron supplementation. 5. Lower limb cellulitis:  Resolved. Completed course of keflex. 6. CAD:  On atorvastatin, fenofibrate  7. Chronic diastolic heart failure: On lasix  8. HTN:  On amlodipine, carvedilol, losartan  9. Chronic DVT:  On eliquis  10. Depression: On celexa. IM added buspirone. Patient previously declined psych evaluation. 11. Restless leg syndrome: On pramipexole  12. Nasal congestion, dryness: Added saline nasal spray as needed on 4/4. 13. Bowel Management: Miralax daily, senokot prn, dulcolax prn, docusate BID prn, milk of magnesia prn  14. DVT prophylaxis:  On eliquis  15. Internal Medicine for medical management  16.  Discharge date changed to Friday after team discussion      Electronically signed by Fartun Su MD on 4/5/2022 at 11:10 PM

## 2022-04-05 NOTE — PROGRESS NOTES
[address] [facility]    ACUTE REHABILITATION OCCUPATIONAL THERAPY  DAILY NOTE    Date: 22  Patient Name: Gin Montero      Room: 8711/2258-94    MRN: [id number]   : 1950  (71 y.o.)  Gender: female   Referring Practitioner: Catalina Griffith MD  Diagnosis: Cervical myelopathy  Additional Pertinent Hx: 71 y.o.  female, with a history of anemia, spondylolisthesis, chronic DVT, chronic diastolic heart failure, CVA, major depressive disorder, s/p cervical spine fusion, stenosis of cervical spine with myelopathy, and DM type II, who presents with leg pain, shortness of breath, fall, and neck pain. According to patient and her , patient has had multiple falls recently including a fall Monday and evening and again on Tuesday. Patient reports that today she felt extremely weak upon waking. Restrictions  Restrictions/Precautions: General Precautions,Fall Risk  Implants present? : Metal implants (cervical and lumbar surgeries, L wrist ORIF)  Other position/activity restrictions: up as tolerated; O2 3L/m via NC  Required Braces or Orthoses?: No  Equipment Used: Other (RW)    Subjective  Subjective: \"I can take a shower\"  Comments: Pt pleasent and agreeable for OT treatment. Restrictions/Precautions: General Precautions; Fall Risk  Overall Orientation Status: Within Functional Limits     Pain Assessment  Pain Assessment: 0-10  Pain Level: 4  Pain Type: Chronic pain,Acute pain  Pain Location: Neck,Chest  Pain Orientation: Mid    Objective  Cognition  Overall Cognitive Status: WFL  Perception  Overall Perceptual Status: WFL  Balance  Standing Balance: Contact guard assistance  Transfers  Sit to stand: Minimal assistance (from recliner)  Stand to sit: Contact guard assistance  Transfer Comments: QUINONES blocked L foot to prevent from slipping. Cued for technique as needed and locking brakes.    Standing Balance  Time: 30-60 sec X8, 7 min  Activity: functional mobility/transfers, ADLs, tabletop task  Comment: 1-2 UE support  Functional Mobility  Functional - Mobility Device: Rolling Walker  Activity: Other (few feet to/fom w/c)  Assist Level: Contact guard assistance  Toilet Transfers  Toilet - Technique: Stand pivot  Equipment Used: Grab bars  Toilet Transfer: Stand by assistance  Toilet Transfers Comments: good safety noted  Shower Transfers  Shower - Transfer From: Wheelchair  Shower - Transfer Type: To and From  Shower - Transfer To: Transfer tub bench  Shower - Technique: Stand pivot  Shower Transfers: Contact Guard  Shower Transfers Comments: 1-2 UE support; VC's for technique; Dysem placed under L foot and shoes on pt to prevent L foot slipping ; increased time to complete, No LOB noted            Additional Activities: PM: Pt instruction in standing tasks at tabletop for facilitation of increased standing balance and tolerance for ease with ADLs. ADL  Feeding: Modified independent  (Per pt report)  Grooming: Modified independent  (seated at sink)  UE Bathing: Stand by assistance  LE Bathing: Stand by assistance (weight shifting for washing buttocks)  UE Dressing: Modified independent   LE Dressing: Contact guard assistance; Increased time to complete (CGA threading BLEs with reacher, SBA pulling over hips)  Toileting: Stand by assistance  Additional Comments: QUINONES facilitated pt in full shower this AM seated on tub bench. Pt demos with good use of long handed sponge for washing feet, weight shifting for washing buttocks. Then transfers into w/c for dressing tasks. ; PM: QUINONES provided pt with BSC for ease and max I with toileting. Also, education provided on max I with ADLs for optimal rehab to go amilcar safely. Assessment  Performance deficits / Impairments: Decreased functional mobility ; Decreased ADL status; Decreased strength;Decreased endurance;Decreased sensation;Decreased balance;Decreased high-level IADLs;Decreased fine motor control;Decreased coordination  Prognosis: Good  Discharge Recommendations: Patient would benefit from continued therapy after discharge  Activity Tolerance: Patient Tolerated treatment well  Safety Devices in place: Yes  Type of devices: Left in chair;Call light within reach; Patient at risk for falls  Restraints  Initially in place: No          Patient Education: Ot POC, safety with functional mobility/transfers, importance of max I with functional mobility for toileting and ADLs for optimal rehab to go home safely, AE and modified techniques for LB dressing  Patient Goals   Patient goals : \"To be able to put my pants/socks on better\"  Learner:patient  Method: explanation       Outcome: acknowledged understanding of         Plan  Plan  Times per week: 5-7  Times per day: Twice a day  Current Treatment Recommendations: Balance Training,Functional Mobility Training,Endurance Training,Strengthening,ROM,Safety Education & Training,Patient/Caregiver Education & Training,Equipment Evaluation, Education, & procurement,Self-Care / David Apley Management  Patient Goals   Patient goals :  \"To be able to put my pants/socks on better\"  Short term goals  Time Frame for Short term goals: 1 week   Short term goal 1: Pt will complete LB dressing/bathing/toileting CGA with Good safety with use of AE/DME/modified techniques for increased IND with self care  Short term goal 2: Pt will participate in 30+ minutes functional activity for increased strength/balance/endurance for increased IND in ADL's  Short term goal 3: Pt will complete transfers/functional mobility CGA with Good safety and RW for increased IND in ADL's  Short term goal 4: Pt will verbalize/demonstrate Good understanding of AE/DME/modified techniques for increased IND in self care  Short term goal 5: Pt will complete UB bathing/dressing/grooming with Supervision for increased IND in self care  Long term goals  Time Frame for Long term goals : by discharge   Long term goal 1: Pt will complete toileting/grooming/dressing Mod I and bathing with Supervison with Good safety with use of AE/DME/modified techniques for increased IND with self care  Long term goal 2: Pt will complete functional mobility/transferes during functional activity Mod I with Good safety and RW for increased IND in ADL's  Long term goal 3: Pt will verbalize/demonstrate Good understanding of fall prevention strategies for increased safety/IND in self care  Long term goal 4: Pt will improve L hand strength for self care as evidence by a 3# improvement in dynomometor testing   Long term goal 5: Pt will improve L FMC as evidence by a 20 second improvement on 9 hole peg test for increased IND with self care        04/05/22 1127 04/05/22 1525   OT Individual Minutes   Time In 0900 1301   Time Out 0959 [id number]   Minutes 59 32     Electronically signed by PATRICIA Clifford on 4/5/22 at 3:26 PM EDT

## 2022-04-05 NOTE — PROGRESS NOTES
[facility] Internal Medicine    CONSULTATION / HISTORY AND PHYSICAL EXAMINATION            Date:   4/5/2022  Patient name:  Poncho Otto  Date of admission:  3/23/2022  4:52 PM  MRN:   [id number]  Account:  [de-identified]  YOB: 1950  PCP:    Keaton Hays MD  Room:   [facility]2622-  Code Status:    Full Code    Physician Requesting Consult: Gino Melissa MD    Reason for Consult:  Medical management    Chief Complaint:     No chief complaint on file.   Debility    History Obtained From:     Patient, EMR, nursing staff    History of Present Illness:     81-year-old female initially presented to the multiple falls over several weeks in the setting of chronic cervical spine stenosis with myelopathy status post cervical fusion follows with neurosurgery  Recently started on Eliquis for acute DVT right leg  Trauma work-up CT brain in this admission unremarkable other than multiple remote and healing rib fractures, patient uses walker at home  Neurology review was obtained for worsening gait instability-MRI thoracic spine did show T2-T3 and T5-T7 moderate neural foraminal narrowing  Also patient was noted to have bilateral leg swelling with some stasis dermatitis, early cellulitis-started on diuresis as well as antibiotics  3/26   Patient feeling better  Feels that anxiety is better after starting buspar   Working with PT  3/28 ]  Patient complaining of cough, wheezing  She told the nurse that, cough is going on since she started lisinopril  Had wheezing, which improved with nebulization  3/29   Patient complaining of cough, shortness of breath, wheezing  Chest x-ray seen,  Had EKG, troponins which are flat  Past Medical History:     Past Medical History:   Diagnosis Date    Allergic rhinitis, cause unspecified     Back pain     lumbar    Bowel obstruction (HCC)     history of due to scar tissue, resolved non-surgically    C. difficile diarrhea     CAD (coronary artery disease)     no stent needed per pt.  Dr. Marlene Alarcon did cath at [facility] 2005    Cardiac murmur     Cellulitis     left leg    Cellulitis 2017 August    leg left leg/bug bite    Cerebral artery occlusion with cerebral infarction [facility])     TIA 2014    COVID-19     ONE YR AGO IN 4/25/2020 fever and cough    Diverticulosis of colon (without mention of hemorrhage)     GERD (gastroesophageal reflux disease)     GERD (gastroesophageal reflux disease)     on rx    History of blood transfusion     approx 2020        History of CHF (congestive heart failure)     History of MI (myocardial infarction) 2005    thought due to a blood clot    History of ovarian cyst 1970    had oopherectomy holly    History of peritonitis 1968    due to ruptured appendix age 13    HTN (hypertension)     Hx of blood clots     right leg    Hyperlipidemia     Intestinal or peritoneal adhesions with obstruction (postoperative) (postinfection) ([address]     Kidney infection     renal failure/sepsis/spider bite    Lateral epicondylitis  of elbow     MDRO (multiple drug resistant organisms) resistance     c diff    Muscle strain     right posterior shoulder    Other abnormal glucose     PONV (postoperative nausea and vomiting)     dry heaves    Pre-diabetes     Restless legs syndrome (RLS)     Snores     no cpap    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)     TIA (transient ischemic attack) 2014    Uses walker     Vitamin D deficiency     Wears glasses     Wellness examination     last seen 2 weeks ago        Past Surgical History:     Past Surgical History:   Procedure Laterality Date    ABDOMEN SURGERY  1976    benign tumor removed near remaining ovary, 1.5 pounds    APPENDECTOMY  1968    appendix ruptured, developed peritonitis    BACK SURGERY      BUNIONECTOMY Left     along with calcium deposits removed   [address]  2005    negative    CERVICAL FUSION  05/21/2021 POSTERIOR C3-6 LAMINECTOMY, PARTIAL C7 LAMINECTOMY, FUSION C3-C6, SILVERCORD    CERVICAL FUSION N/A 5/21/2021    POSTERIOR C3-6 LAMINECTOMY, PARTIAL C7 LAMINECTOMY, FUSION C3-C6, SILVERCORD performed by Ad Marquis DO at [address]    12 INCHES REMOVED D/T OBSTRUCTION    COLONOSCOPY      CYST REMOVAL Right     right facial    HYSTERECTOMY  1973    taken as a result of recurring cysts    LUMBAR FUSION N/A 02/10/2020    LUMBAR L4-5 POSTERIOR  DECOMPRESSION INSTRUMENTATION FUSION WCEMENT AUGMENTATION/ performed by Shelly Lara MD at [facility] N/A 06/17/2020    L5-S1 PLIF L4-L5 REVISION performed by Shelly Lara MD at [address]  08/14/2014    FESS    OVARY REMOVAL  1970    UNILATERAL due to cyst    OVARY REMOVAL  1971    partial, due to cyst    SINUS SURGERY  2004    UPPER GASTROINTESTINAL ENDOSCOPY N/A 05/31/2019    EGD ESOPHAGOGASTRODUODENOSCOPY performed by Anne Mayer MD at [facility] [facility] N/A 08/05/2019    EGD BIOPSY performed by Nadiya Garcia MD at [facility] [facility] N/A 08/23/2019    EGD BIOPSY performed by Anne Mayer MD at [address] [facility] 03/05/2019    WRIST OPEN REDUCTION INTERNAL FIXATION performed by Gonsalo Faye MD at [address]        Medications Prior to Admission:     Prior to Admission medications    Medication Sig Start Date End Date Taking?  Authorizing Provider   amLODIPine (NORVASC) 10 MG tablet Take 1 tablet by mouth daily 3/10/22   Gilson Valdez MD   furosemide (LASIX) 40 MG tablet Take 1 tablet by mouth 2 times daily 3/1/22   MAIK Johnson CNP   carvedilol (COREG) 12.5 MG tablet Take 1 tablet by mouth 2 times daily 2/23/22   MAIK Johnson CNP   apixaban (ELIQUIS) 5 MG TABS tablet Please take 2 tablets by mouth for the first week then take 1 tablet by mouth BID for acute DVT  Patient taking differently: Take 5 mg by mouth 2 times daily take 1 tablet by mouth BID for acute DVT 2/17/22   MAIK Davis CNP   atorvastatin (LIPITOR) 80 MG tablet Take 0.5 tablets by mouth nightly 2/1/22   MAIK Davis CNP   lisinopril (PRINIVIL;ZESTRIL) 30 MG tablet Take 1 tablet by mouth daily 1/21/22   Aleksandr Shafer MD   fenofibrate micronized (LOFIBRA) 134 MG capsule Take 1 capsule by mouth every morning (before breakfast) 1/13/22   Aleksandr Shafer MD   pramipexole (MIRAPEX) 0.5 MG tablet Take 1 tablet by mouth nightly 1/6/22   Aleksandr Shafer MD   citalopram (CELEXA) 20 MG tablet Take 1 tablet by mouth daily 1/3/22   Diane Carranza MD   nitroGLYCERIN (NITROSTAT) 0.4 MG SL tablet Place 1 tablet under the tongue every 5 minutes as needed for Chest pain 9/1/21   Aleksandr Shafer MD   docusate sodium (COLACE) 100 MG capsule Take 1 capsule by mouth 2 times daily as needed for Constipation 5/30/21   Raj Marley,    diphenhydrAMINE HCl (BENADRYL ALLERGY PO) Take 1 capsule by mouth daily Takes q HS    Historical Provider, MD   cyanocobalamin ([employer] VITAMIN B12) 1000 MCG tablet Take 1 tablet by mouth daily 12/3/20   Aleksandr Shafer MD   ferrous sulfate (IRON 325) 325 (65 Fe) MG tablet Take 1 tablet by mouth 2 times daily 7/2/20   Tamiko Ledbetter MD   VITAMIN D, ERGOCALCIFEROL, PO Take by mouth    Historical Provider, MD   Lancets MISC 1 each by Does not apply route daily 10/10/19   Arabella Boas, MD   blood glucose monitor strips Test 2 times a day & as needed for symptoms of irregular blood glucose. 10/10/19   Arabella Boas, MD   Calcium Carbonate-Vitamin D (CALCIUM 500/D) 500-125 MG-UNIT TABS Take 1 tablet by mouth daily     Historical Provider, MD        Allergies:     Bactrim [sulfamethoxazole-trimethoprim], Codeine, and Seasonal    Social History:     Tobacco:    reports that she quit smoking about 4 years ago. Her smoking use included cigarettes.  She started smoking about 26 years ago. She has a 10.00 pack-year smoking history. She has never used smokeless tobacco.  Alcohol:      reports no history of alcohol use. Drug Use:  reports no history of drug use. Family History:     Family History   Problem Relation Age of Onset    Stroke Mother     Diabetes Mother     Heart Disease Mother     High Blood Pressure Mother     Heart Disease Father     Heart Disease Brother     High Blood Pressure Brother     Heart Disease Maternal Grandmother     High Blood Pressure Sister        Review of Systems:     Positive and Negative as described in HPI. CONSTITUTIONAL:  negative for fevers, chills, sweats, fatigue, weight loss  HEENT:  negative for vision, hearing changes, runny nose, throat pain  RESPIRATORY: Positive for cough, shortness of breath, wheezing  CARDIOVASCULAR: Positive for chest, worse with cough. GASTROINTESTINAL:  negative for nausea, vomiting, diarrhea, constipation, change in bowel habits, abdominal pain   GENITOURINARY:  negative for difficulty of urination, burning with urination, frequency   INTEGUMENT:  negative for rash, skin lesions, easy bruising   HEMATOLOGIC/LYMPHATIC:  negative for swelling/edema   ALLERGIC/IMMUNOLOGIC:  negative for urticaria , itching  ENDOCRINE:  negative increase in drinking, increase in urination, hot or cold intolerance  MUSCULOSKELETAL:  negative joint pains, muscle aches, swelling of joints  NEUROLOGICAL:  negative for headaches, dizziness, lightheadedness, numbness, pain, tingling extremities      Physical Exam:     BP (!) 116/57   Pulse 60   Temp 98 °F (36.7 °C) (Oral)   Resp 18   Ht 5' 3\" (1.6 m)   Wt 180 lb 6.4 oz (81.8 kg)   SpO2 92%   BMI 31.96 kg/m²   Temp (24hrs), Av.2 °F (36.8 °C), Min:98 °F (36.7 °C), Max:98.4 °F (36.9 °C)    No results for input(s): POCGLU in the last 72 hours.   No intake or output data in the 24 hours ending 22 [id number]    General Appearance:  alert, well appearing, and in no acute distress  Head:  normocephalic, atraumatic. Eye: no icterus, redness, pupils equal and reactive, extraocular eye movements intact, conjunctiva clear  Ear: normal external ear, no discharge, hearing intact  Nose:  no drainage noted  Mouth: mucous membranes moist  Neck: supple, no carotid bruits, thyroid not palpable  Lungs: Air entry but decreased, better wheezing present  Cardiovascular: normal rate, regular rhythm, no murmur, gallop, rub. Abdomen: Soft, nontender, nondistended, normal bowel sounds, no hepatomegaly or splenomegaly  Neurologic: There are no new focal motor or sensory deficits, normal muscle tone and bulk, no abnormal sensation, normal speech, cranial nerves II through XII grossly intact  Skin: No gross lesions, rashes, bruising or bleeding on exposed skin area  Extremities:  peripheral pulses palpable, no pedal edema or calf pain with palpation  Psych: normal affect    Investigations:      Laboratory Testing:  No results found for this or any previous visit (from the past 24 hour(s)).     Imaging/Diagonstics:  Recent data reviewed    Assessment :      Primary Problem  Cervical myelopathy [facility])    Active Hospital Problems    Diagnosis Date Noted    Cervical myelopathy ([facility] [address] [G95.9] 03/17/2022       Plan:     Multiple falls, gait instability, multiple remote and healing rib fractures leading to chest pain-needs PT OT  Neural foraminal narrowing of thoracic spine-neurology as reviewed-recommend rehab and physical therapy  Hypertension-continue amlodipine, Coreg  Right leg DVT-continue Eliquis  Chronic diastolic CHF-currently compensated-continue Coreg, Lipitor, Lasix, lisinopril  Depression -patient noted to be very tearful today-is already on maximal dose of  Celexa, will add BuSpar    DVT prophylaxis-patient on Eliquis    3/27   But has readings of low blood pressure, asymptomatic  Reducing dose of Norvasc to 5 from 10    3/28   Patient has chronic cough, started since she is put on ACE inhibitor  May have ACE inhibitor induced cough, will discontinue lisinopril, start patient on Cozaar  Started patient given nebulization  Has history of smoking  Chest x-ray seen   3/29   Suspecting symptoms are due to acute bronchitis,  Ordering Z-Han, prednisone  EKG seen, troponins are flat  Chest x-ray seen  We will monitor  4/3  Patient shortness of breath is getting better being on steroids  Oxygen requirement going down   We will try to wean oxygen, discussed with RN  Completed antibiotics and steroid    4/4  SOB, little better   On NC o2, 2L  Wean off o2  PT/OT   Labs and vitals reviewed       4/5  Multiple falls, gait instability,  Working with physical therapy,  Improving,  Chronic respiratory failure, on 2 L of oxygen, wean off oxygen,  Morbid obesity, low-calorie diet,  PT OT,  Labs and vitals reviewed,  Medications and their side effects reviewed    Consultations:     IP CONSULT TO DIETITIAN  IP CONSULT TO SOCIAL WORK  IP CONSULT TO [address] [place] [address]      Candace Jhaveri MD  4/5/2022  5:29 PM    Copy sent to Dr. Galen Titus MD    Please note that this chart was generated using voice recognition Dragon dictation software. Although every effort was made to ensure the accuracy of this automated transcription, some errors in transcription may have occurred.

## 2022-04-05 NOTE — FLOWSHEET NOTE
04/05/22 1309   Encounter Summary   Services provided to: Patient not available  (patient with therapist)

## 2022-04-05 NOTE — PLAN OF CARE
Problem: Skin Integrity:  Goal: Will show no infection signs and symptoms  Description: Will show no infection signs and symptoms  4/5/2022 1502 by Manjeet Briceño RN  Outcome: Ongoing     Problem: Falls - Risk of:  Goal: Will remain free from falls  Description: Will remain free from falls  4/5/2022 1502 by Manjeet Briceño RN  Outcome: Ongoing     Problem: Pain:  Goal: Pain level will decrease  Description: Pain level will decrease  4/5/2022 1502 by Manjeet Briceño RN  Outcome: Ongoing     Problem: Mobility - Impaired:  Goal: Mobility will improve  Description: Mobility will improve  4/5/2022 1502 by Manjeet Briceño RN  Outcome: Ongoing     Problem: Musculor/Skeletal Functional Status  Goal: Highest potential functional level  4/5/2022 1502 by Manjeet Briceño RN  Outcome: Ongoing     Problem: Nutrition  Goal: Optimal nutrition therapy  4/5/2022 1502 by Manjeet Briceño RN  Outcome: Ongoing

## 2022-04-05 NOTE — PROGRESS NOTES
[facility]    CONSULTATION / HISTORY AND PHYSICAL EXAMINATION            Date:   4/4/2022  Patient name:  Jocelyne Lora  Date of admission:  3/23/2022  4:52 PM  MRN:   [id number]  Account:  [de-identified]  YOB: 1950  PCP:    Tracy Malik MD  Room:   [facility]-  Code Status:    Full Code    Physician Requesting Consult: Lazarus Carrillo MD    Reason for Consult:  Medical management    Chief Complaint:     No chief complaint on file.   Debility    History Obtained From:     Patient, EMR, nursing staff    History of Present Illness:     61-year-old female initially presented to the multiple falls over several weeks in the setting of chronic cervical spine stenosis with myelopathy status post cervical fusion follows with neurosurgery  Recently started on Eliquis for acute DVT right leg  Trauma work-up CT brain in this admission unremarkable other than multiple remote and healing rib fractures, patient uses walker at home  Neurology review was obtained for worsening gait instability-MRI thoracic spine did show T2-T3 and T5-T7 moderate neural foraminal narrowing  Also patient was noted to have bilateral leg swelling with some stasis dermatitis, early cellulitis-started on diuresis as well as antibiotics  3/26   Patient feeling better  Feels that anxiety is better after starting buspar   Working with PT  3/28 ]  Patient complaining of cough, wheezing  She told the nurse that, cough is going on since she started lisinopril  Had wheezing, which improved with nebulization  3/29   Patient complaining of cough, shortness of breath, wheezing  Chest x-ray seen,  Had EKG, troponins which are flat  Past Medical History:     Past Medical History:   Diagnosis Date    Allergic rhinitis, cause unspecified     Back pain     lumbar    Bowel obstruction (HCC)     history of due to scar tissue, resolved non-surgically    C. difficile diarrhea     CAD (coronary artery disease)     no stent needed per pt.  Dr. Stephenie Shultz did cath at [facility]    Cardiac murmur     Cellulitis     left leg    Cellulitis 2017 August    leg left leg/bug bite    Cerebral artery occlusion with cerebral infarction [facility])     TIA 2014    COVID-19     ONE YR AGO IN 4/25/2020 fever and cough    Diverticulosis of colon (without mention of hemorrhage)     GERD (gastroesophageal reflux disease)     GERD (gastroesophageal reflux disease)     on rx    History of blood transfusion     approx 2020        History of CHF (congestive heart failure)     History of MI (myocardial infarction) 2005    thought due to a blood clot    History of ovarian cyst 1970    had oopherectomy holly    History of peritonitis 1968    due to ruptured appendix age 13    HTN (hypertension)     Hx of blood clots     right leg    Hyperlipidemia     Intestinal or peritoneal adhesions with obstruction (postoperative) (postinfection) ([address]     Kidney infection     renal failure/sepsis/spider bite    Lateral epicondylitis  of elbow     MDRO (multiple drug resistant organisms) resistance     c diff    Muscle strain     right posterior shoulder    Other abnormal glucose     PONV (postoperative nausea and vomiting)     dry heaves    Pre-diabetes     Restless legs syndrome (RLS)     Snores     no cpap    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)     TIA (transient ischemic attack) 2014    Uses walker     Vitamin D deficiency     Wears glasses     Wellness examination     last seen 2 weeks ago        Past Surgical History:     Past Surgical History:   Procedure Laterality Date    ABDOMEN SURGERY  1976    benign tumor removed near remaining ovary, 1.5 pounds    APPENDECTOMY  1968    appendix ruptured, developed peritonitis    BACK SURGERY      BUNIONECTOMY Left     along with calcium deposits removed   [address]  2005    negative    CERVICAL FUSION  05/21/2021 POSTERIOR C3-6 LAMINECTOMY, PARTIAL C7 LAMINECTOMY, FUSION C3-C6, SILVERCORD    CERVICAL FUSION N/A 5/21/2021    POSTERIOR C3-6 LAMINECTOMY, PARTIAL C7 LAMINECTOMY, FUSION C3-C6, SILVERCORD performed by Lonny Em DO at [address]    12 INCHES REMOVED D/T OBSTRUCTION    COLONOSCOPY      CYST REMOVAL Right     right facial    HYSTERECTOMY  1973    taken as a result of recurring cysts    LUMBAR FUSION N/A 02/10/2020    LUMBAR L4-5 POSTERIOR  DECOMPRESSION INSTRUMENTATION FUSION WCEMENT AUGMENTATION/ performed by Nadeem Phillips MD at [facility] N/A 06/17/2020    L5-S1 PLIF L4-L5 REVISION performed by Nadeem Phillips MD at [address] [place] [address]  08/14/2014    FESS    OVARY REMOVAL  1970    UNILATERAL due to cyst    OVARY REMOVAL  1971    partial, due to cyst    SINUS SURGERY  2004    UPPER GASTROINTESTINAL ENDOSCOPY N/A 05/31/2019    EGD ESOPHAGOGASTRODUODENOSCOPY performed by Maria Antonia Branham MD at [facility] [facility] N/A 08/05/2019    EGD BIOPSY performed by Susanne Pereira MD at [address] [place] [address] N/A 08/23/2019    EGD BIOPSY performed by Maria Antonia Branham MD at [address] Left 03/05/2019    WRIST OPEN REDUCTION INTERNAL FIXATION performed by Mary Beth Sams MD at [address]        Medications Prior to Admission:     Prior to Admission medications    Medication Sig Start Date End Date Taking?  Authorizing Provider   amLODIPine (NORVASC) 10 MG tablet Take 1 tablet by mouth daily 3/10/22   Eder Ramirez MD   furosemide (LASIX) 40 MG tablet Take 1 tablet by mouth 2 times daily 3/1/22   MAIK Herr CNP   carvedilol (COREG) 12.5 MG tablet Take 1 tablet by mouth 2 times daily 2/23/22   MAIK Herr CNP   apixaban (ELIQUIS) 5 MG TABS tablet Please take 2 tablets by mouth for the first week then take 1 tablet by mouth BID for acute DVT  Patient taking differently: Take 5 mg by mouth 2 times daily take 1 tablet by mouth BID for acute DVT 2/17/22   MAIK Frey CNP   atorvastatin (LIPITOR) 80 MG tablet Take 0.5 tablets by mouth nightly 2/1/22   MAIK Frey CNP   lisinopril (PRINIVIL;ZESTRIL) 30 MG tablet Take 1 tablet by mouth daily 1/21/22   Beto Louis MD   fenofibrate micronized (LOFIBRA) 134 MG capsule Take 1 capsule by mouth every morning (before breakfast) 1/13/22   Beto Louis MD   pramipexole (MIRAPEX) 0.5 MG tablet Take 1 tablet by mouth nightly 1/6/22   Beto Louis MD   citalopram (CELEXA) 20 MG tablet Take 1 tablet by mouth daily 1/3/22   Nany Lew MD   nitroGLYCERIN (NITROSTAT) 0.4 MG SL tablet Place 1 tablet under the tongue every 5 minutes as needed for Chest pain 9/1/21   Beto Louis MD   docusate sodium (COLACE) 100 MG capsule Take 1 capsule by mouth 2 times daily as needed for Constipation 5/30/21   Nonnie Goodell Blankenship,    diphenhydrAMINE HCl (BENADRYL ALLERGY PO) Take 1 capsule by mouth daily Takes q HS    Historical Provider, MD   cyanocobalamin ([employer] VITAMIN B12) 1000 MCG tablet Take 1 tablet by mouth daily 12/3/20   Beto Louis MD   ferrous sulfate (IRON 325) 325 (65 Fe) MG tablet Take 1 tablet by mouth 2 times daily 7/2/20   Marcial Cueva MD   VITAMIN D, ERGOCALCIFEROL, PO Take by mouth    Historical Provider, MD   Lancets MISC 1 each by Does not apply route daily 10/10/19   Pamela Izaguirre MD   blood glucose monitor strips Test 2 times a day & as needed for symptoms of irregular blood glucose. 10/10/19   Pamela Izaguirre MD   Calcium Carbonate-Vitamin D (CALCIUM 500/D) 500-125 MG-UNIT TABS Take 1 tablet by mouth daily     Historical Provider, MD        Allergies:     Bactrim [sulfamethoxazole-trimethoprim], Codeine, and Seasonal    Social History:     Tobacco:    reports that she quit smoking about 4 years ago. Her smoking use included cigarettes.  She started smoking about 26 years ago. She has a 10.00 pack-year smoking history. She has never used smokeless tobacco.  Alcohol:      reports no history of alcohol use. Drug Use:  reports no history of drug use. Family History:     Family History   Problem Relation Age of Onset    Stroke Mother     Diabetes Mother     Heart Disease Mother     High Blood Pressure Mother     Heart Disease Father     Heart Disease Brother     High Blood Pressure Brother     Heart Disease Maternal Grandmother     High Blood Pressure Sister        Review of Systems:     Positive and Negative as described in HPI. CONSTITUTIONAL:  negative for fevers, chills, sweats, fatigue, weight loss  HEENT:  negative for vision, hearing changes, runny nose, throat pain  RESPIRATORY: Positive for cough, shortness of breath, wheezing  CARDIOVASCULAR: Positive for chest, worse with cough. GASTROINTESTINAL:  negative for nausea, vomiting, diarrhea, constipation, change in bowel habits, abdominal pain   GENITOURINARY:  negative for difficulty of urination, burning with urination, frequency   INTEGUMENT:  negative for rash, skin lesions, easy bruising   HEMATOLOGIC/LYMPHATIC:  negative for swelling/edema   ALLERGIC/IMMUNOLOGIC:  negative for urticaria , itching  ENDOCRINE:  negative increase in drinking, increase in urination, hot or cold intolerance  MUSCULOSKELETAL:  negative joint pains, muscle aches, swelling of joints  NEUROLOGICAL:  negative for headaches, dizziness, lightheadedness, numbness, pain, tingling extremities      Physical Exam:     BP (!) 110/48   Pulse 66   Temp 98.4 °F (36.9 °C)   Resp 18   Ht 5' 3\" (1.6 m)   Wt 180 lb 6.4 oz (81.8 kg)   SpO2 97%   BMI 31.96 kg/m²   Temp (24hrs), Av.3 °F (36.8 °C), Min:98.2 °F (36.8 °C), Max:98.4 °F (36.9 °C)    No results for input(s): POCGLU in the last 72 hours.   No intake or output data in the 24 hours ending 222    General Appearance:  alert, well appearing, and in no acute distress  Head:  normocephalic, atraumatic. Eye: no icterus, redness, pupils equal and reactive, extraocular eye movements intact, conjunctiva clear  Ear: normal external ear, no discharge, hearing intact  Nose:  no drainage noted  Mouth: mucous membranes moist  Neck: supple, no carotid bruits, thyroid not palpable  Lungs: Air entry but decreased, better wheezing present  Cardiovascular: normal rate, regular rhythm, no murmur, gallop, rub. Abdomen: Soft, nontender, nondistended, normal bowel sounds, no hepatomegaly or splenomegaly  Neurologic: There are no new focal motor or sensory deficits, normal muscle tone and bulk, no abnormal sensation, normal speech, cranial nerves II through XII grossly intact  Skin: No gross lesions, rashes, bruising or bleeding on exposed skin area  Extremities:  peripheral pulses palpable, no pedal edema or calf pain with palpation  Psych: normal affect    Investigations:      Laboratory Testing:  No results found for this or any previous visit (from the past 24 hour(s)).     Imaging/Diagonstics:  Recent data reviewed    Assessment :      Primary Problem  Cervical myelopathy [facility])    Active Hospital Problems    Diagnosis Date Noted    Cervical myelopathy ([address] [G95.9] 03/17/2022       Plan:     Multiple falls, gait instability, multiple remote and healing rib fractures leading to chest pain-needs PT OT  Neural foraminal narrowing of thoracic spine-neurology as reviewed-recommend rehab and physical therapy  Hypertension-continue amlodipine, Coreg  Right leg DVT-continue Eliquis  Chronic diastolic CHF-currently compensated-continue Coreg, Lipitor, Lasix, lisinopril  Depression -patient noted to be very tearful today-is already on maximal dose of  Celexa, will add BuSpar    DVT prophylaxis-patient on Eliquis    3/27   But has readings of low blood pressure, asymptomatic  Reducing dose of Norvasc to 5 from 10    3/28   Patient has chronic cough, started since she is put on ACE inhibitor  May have ACE inhibitor induced cough, will discontinue lisinopril, start patient on Cozaar  Started patient given nebulization  Has history of smoking  Chest x-ray seen   3/29   Suspecting symptoms are due to acute bronchitis,  Ordering Z-Han, prednisone  EKG seen, troponins are flat  Chest x-ray seen  We will monitor  4/3  Patient shortness of breath is getting better being on steroids  Oxygen requirement going down   We will try to wean oxygen, discussed with RN  Completed antibiotics and steroid    4/4  SOB, little better   On NC o2, 2L  Wean off o2  PT/OT   Labs and vitals reviewed     Consultations:     Amy Yee MD  4/4/2022  10:02 PM    Copy sent to Dr. Dong Lan MD    Please note that this chart was generated using voice recognition Dragon dictation software. Although every effort was made to ensure the accuracy of this automated transcription, some errors in transcription may have occurred.

## 2022-04-05 NOTE — PLAN OF CARE
Problem: Skin Integrity:  Goal: Absence of new skin breakdown  Description: Absence of new skin breakdown  Outcome: Ongoing  Note: Skin assessment completed this shift. Nutrition and Hydration status assessed with adequate intake. Roger Score as charted. Bilateral heels remain elevated on pillows throughout the shift. Patient able to reposition self for comfort and to prevent breakdown. Patient verbalizes understanding of pressure ulcer prevention measures. Skin integrity maintained. No new skin breakdown noted. Skin to high risk pressure areas including coccyx and heels are clear. Myah / Incontinence care provided as needed throughout the shift. Problem: Falls - Risk of:  Goal: Will remain free from falls  Description: Will remain free from falls  Outcome: Ongoing  Note: No falls or injuries sustained at this time. No attempts to get out of bed without nursing assistance. Call light within reach and pt. uses appropriately for assistance. Siderails up x 2. Nonskid footwear remains on. Bed in low and locked position. Hourly nursing rounds made. Pt. Alert and oriented, aware of limitations, and exhibits good safety judgement. Pt. uses assistive devices appropriately including Earlean Mitts. Pt. understands individual fall risk factors. Pt. reoriented to surroundings and reminded to use call light with each nurse/patient interaction. Bed alarm remains engaged throughout the shift as a precaution. Problem: Pain:  Goal: Control of acute pain  Description: Control of acute pain  Outcome: Ongoing  Note: Pain assessment completed. Pt. able to rest.  Patient medicated with Ultram for sternal and back pain this shift as ordered. Patient relates a tolerable level of discomfort with the current medication. Pt. Repositions per self for comfort. Nonverbal cues indicate pain relief. Pt. Rests quietly with eyes closed after pain medication administration. Respirations easy and unlabored.  Appears free from distress.

## 2022-04-05 NOTE — CARE COORDINATION
DME Order received for Tub Transfer Bench; Order, facesheet and face-to-face was faxed to [facility]. Patient's insurance will not cover this item. Writer asks pt if they wish to proceed with ordering through [address] and paying 99$ or if they want to cancel order and purchase tub tranfser from other source. Pt wishes to look elsewhere for this item. Order cancelled by this writer.    Electronically signed by Farida Garcia PT on 4/5/2022 at 9:42 AM

## 2022-04-06 PROCEDURE — 97110 THERAPEUTIC EXERCISES: CPT

## 2022-04-06 PROCEDURE — 99231 SBSQ HOSP IP/OBS SF/LOW 25: CPT | Performed by: STUDENT IN AN ORGANIZED HEALTH CARE EDUCATION/TRAINING PROGRAM

## 2022-04-06 PROCEDURE — 6370000000 HC RX 637 (ALT 250 FOR IP): Performed by: INTERNAL MEDICINE

## 2022-04-06 PROCEDURE — 99232 SBSQ HOSP IP/OBS MODERATE 35: CPT | Performed by: INTERNAL MEDICINE

## 2022-04-06 PROCEDURE — 6370000000 HC RX 637 (ALT 250 FOR IP): Performed by: PHYSICAL MEDICINE & REHABILITATION

## 2022-04-06 PROCEDURE — 94761 N-INVAS EAR/PLS OXIMETRY MLT: CPT

## 2022-04-06 PROCEDURE — 6370000000 HC RX 637 (ALT 250 FOR IP): Performed by: NURSE PRACTITIONER

## 2022-04-06 PROCEDURE — 97535 SELF CARE MNGMENT TRAINING: CPT

## 2022-04-06 PROCEDURE — 94640 AIRWAY INHALATION TREATMENT: CPT

## 2022-04-06 PROCEDURE — 2500000003 HC RX 250 WO HCPCS: Performed by: PHYSICAL MEDICINE & REHABILITATION

## 2022-04-06 PROCEDURE — 97116 GAIT TRAINING THERAPY: CPT

## 2022-04-06 PROCEDURE — 1180000000 HC REHAB R&B

## 2022-04-06 PROCEDURE — 97530 THERAPEUTIC ACTIVITIES: CPT

## 2022-04-06 RX ADMIN — APIXABAN 5 MG: 5 TABLET, FILM COATED ORAL at 08:28

## 2022-04-06 RX ADMIN — IPRATROPIUM BROMIDE AND ALBUTEROL SULFATE 1 AMPULE: 2.5; .5 SOLUTION RESPIRATORY (INHALATION) at 19:34

## 2022-04-06 RX ADMIN — FUROSEMIDE 20 MG: 20 TABLET ORAL at 08:28

## 2022-04-06 RX ADMIN — LOSARTAN POTASSIUM 100 MG: 100 TABLET, FILM COATED ORAL at 08:28

## 2022-04-06 RX ADMIN — CYANOCOBALAMIN TAB 1000 MCG 1000 MCG: 1000 TAB at 08:28

## 2022-04-06 RX ADMIN — Medication 6 MG: at 21:37

## 2022-04-06 RX ADMIN — CARVEDILOL 12.5 MG: 12.5 TABLET, FILM COATED ORAL at 08:28

## 2022-04-06 RX ADMIN — AMLODIPINE BESYLATE 5 MG: 5 TABLET ORAL at 08:28

## 2022-04-06 RX ADMIN — BUSPIRONE HYDROCHLORIDE 5 MG: 5 TABLET ORAL at 21:37

## 2022-04-06 RX ADMIN — GUAIFENESIN 100 MG: 200 SOLUTION ORAL at 21:38

## 2022-04-06 RX ADMIN — ATORVASTATIN CALCIUM 40 MG: 40 TABLET, FILM COATED ORAL at 21:38

## 2022-04-06 RX ADMIN — GABAPENTIN 200 MG: 100 CAPSULE ORAL at 21:37

## 2022-04-06 RX ADMIN — IPRATROPIUM BROMIDE AND ALBUTEROL SULFATE 1 AMPULE: 2.5; .5 SOLUTION RESPIRATORY (INHALATION) at 16:00

## 2022-04-06 RX ADMIN — CITALOPRAM HYDROBROMIDE 20 MG: 20 TABLET ORAL at 08:28

## 2022-04-06 RX ADMIN — TRAMADOL HYDROCHLORIDE 50 MG: 50 TABLET, COATED ORAL at 05:48

## 2022-04-06 RX ADMIN — IPRATROPIUM BROMIDE AND ALBUTEROL SULFATE 1 AMPULE: 2.5; .5 SOLUTION RESPIRATORY (INHALATION) at 11:47

## 2022-04-06 RX ADMIN — GUAIFENESIN 100 MG: 200 SOLUTION ORAL at 05:49

## 2022-04-06 RX ADMIN — CARVEDILOL 12.5 MG: 12.5 TABLET, FILM COATED ORAL at 21:37

## 2022-04-06 RX ADMIN — APIXABAN 5 MG: 5 TABLET, FILM COATED ORAL at 21:37

## 2022-04-06 RX ADMIN — FUROSEMIDE 20 MG: 20 TABLET ORAL at 17:01

## 2022-04-06 RX ADMIN — POLYETHYLENE GLYCOL 3350 17 G: 17 POWDER, FOR SOLUTION ORAL at 08:28

## 2022-04-06 RX ADMIN — CALCIUM CARBONATE 600 MG (1,500 MG)-VITAMIN D3 400 UNIT TABLET 1 TABLET: at 08:28

## 2022-04-06 RX ADMIN — FENOFIBRATE 160 MG: 160 TABLET ORAL at 08:28

## 2022-04-06 RX ADMIN — FERROUS SULFATE TAB 325 MG (65 MG ELEMENTAL FE) 325 MG: 325 (65 FE) TAB at 21:50

## 2022-04-06 RX ADMIN — BUSPIRONE HYDROCHLORIDE 5 MG: 5 TABLET ORAL at 14:14

## 2022-04-06 RX ADMIN — FERROUS SULFATE TAB 325 MG (65 MG ELEMENTAL FE) 325 MG: 325 (65 FE) TAB at 08:27

## 2022-04-06 RX ADMIN — GABAPENTIN 200 MG: 100 CAPSULE ORAL at 08:28

## 2022-04-06 RX ADMIN — BUSPIRONE HYDROCHLORIDE 5 MG: 5 TABLET ORAL at 08:37

## 2022-04-06 RX ADMIN — PRAMIPEXOLE DIHYDROCHLORIDE 0.5 MG: 0.25 TABLET ORAL at 21:37

## 2022-04-06 RX ADMIN — TRAMADOL HYDROCHLORIDE 50 MG: 50 TABLET, COATED ORAL at 21:36

## 2022-04-06 ASSESSMENT — PAIN DESCRIPTION - LOCATION
LOCATION: CHEST
LOCATION: NECK

## 2022-04-06 ASSESSMENT — PAIN DESCRIPTION - ORIENTATION
ORIENTATION: ANTERIOR
ORIENTATION: LEFT;LOWER;POSTERIOR

## 2022-04-06 ASSESSMENT — PAIN DESCRIPTION - DESCRIPTORS
DESCRIPTORS: SORE
DESCRIPTORS: SORE

## 2022-04-06 ASSESSMENT — PAIN SCALES - GENERAL
PAINLEVEL_OUTOF10: 5
PAINLEVEL_OUTOF10: 0
PAINLEVEL_OUTOF10: 5
PAINLEVEL_OUTOF10: 0
PAINLEVEL_OUTOF10: 5
PAINLEVEL_OUTOF10: 5

## 2022-04-06 ASSESSMENT — PAIN DESCRIPTION - FREQUENCY
FREQUENCY: CONTINUOUS
FREQUENCY: CONTINUOUS

## 2022-04-06 ASSESSMENT — PAIN DESCRIPTION - PAIN TYPE: TYPE: ACUTE PAIN

## 2022-04-06 ASSESSMENT — PAIN DESCRIPTION - PROGRESSION: CLINICAL_PROGRESSION: GRADUALLY IMPROVING

## 2022-04-06 NOTE — CARE COORDINATION
ONGOING DISCHARGE PLAN:     Patient is alert and oriented x4.     Spoke with patient regarding discharge plan and patient confirms that plan is still going home with      Called and spoke with Robbie/ appointment given to for follow up with Dr. Carli Hogan on 6/13/22.  Angle informs this writer she will reach out to Dr. Carli Hogan to confirm this appointment date will be appropriate.      Will continue to follow for additional discharge needs.     Electronically signed by Rei Jara RN on 4/6/2022 at 10:02 AM

## 2022-04-06 NOTE — PLAN OF CARE
Problem: Skin Integrity:  Goal: Will show no infection signs and symptoms  Description: Will show no infection signs and symptoms  4/6/2022 0233 by Jerson Spencer RN  Outcome: Ongoing  4/5/2022 1502 by Vijay Silverman RN  Outcome: Ongoing  4/5/2022 1501 by Vijay Silverman RN  Outcome: Ongoing  Note: No signs of increased skin or tissue breakdown is noted. See Head to Toe/LDA assessments in flowsheets. Goal: Absence of new skin breakdown  Description: Absence of new skin breakdown  4/6/2022 0233 by Jerson Spencer RN  Outcome: Ongoing  4/5/2022 1502 by Vijay Silverman RN  Outcome: Ongoing  4/5/2022 1501 by Vijay Silverman RN  Outcome: Ongoing     Problem: Falls - Risk of:  Goal: Will remain free from falls  Description: Will remain free from falls  4/6/2022 0233 by Jerson Spencer RN  Outcome: Ongoing  4/5/2022 1502 by Vijay Silverman RN  Outcome: Ongoing  4/5/2022 1501 by Vijay Silverman RN  Outcome: Ongoing  Note: Patient remains free of falls and injuries throughout shift. Bed remains in the lowest position, wheels locked, call light and bedside table are within reach. Goal: Absence of physical injury  Description: Absence of physical injury  4/6/2022 0233 by Jerson Spencer RN  Outcome: Ongoing  4/5/2022 1502 by Vijay Silverman RN  Outcome: Ongoing  4/5/2022 1501 by Vijay Silverman RN  Outcome: Ongoing     Problem: Pain:  Goal: Pain level will decrease  Description: Pain level will decrease  4/6/2022 0233 by Jerson Spencer RN  Outcome: Ongoing  4/5/2022 1502 by Vijay Silverman RN  Outcome: Ongoing  4/5/2022 1501 by Vijay Silverman RN  Outcome: Ongoing  Note: Patient's pain is well controlled with current regimen. See MAR.    Goal: Control of acute pain  Description: Control of acute pain  4/6/2022 0233 by Jerson Spencer RN  Outcome: Ongoing  4/5/2022 1502 by Margurette Silverman, RN  Outcome: Ongoing  4/5/2022 1501 by Vijay Silverman RN  Outcome: Ongoing  Goal: Control of chronic pain  Description: Control of chronic pain  4/6/2022 0233 by Les Leventhal, RN  Outcome: Ongoing  4/5/2022 1502 by Ashlee Boyer RN  Outcome: Ongoing  4/5/2022 1501 by Ashlee Boyer RN  Outcome: Ongoing     Problem: Mobility - Impaired:  Goal: Mobility will improve  Description: Mobility will improve  4/6/2022 0233 by Les Leventhal, RN  Outcome: Ongoing  4/5/2022 1502 by Ashlee Boyer RN  Outcome: Ongoing  4/5/2022 1501 by Ashlee Boyer RN  Outcome: Ongoing     Problem: Musculor/Skeletal Functional Status  Goal: Highest potential functional level  4/6/2022 0233 by Les Leventhal, RN  Outcome: Ongoing  4/5/2022 1502 by Ashlee Boyer RN  Outcome: Ongoing  4/5/2022 1501 by Ashlee Boyer RN  Outcome: Ongoing  Goal: Absence of falls  4/6/2022 0233 by Les Leventhal, RN  Outcome: Ongoing  4/5/2022 1502 by Ashlee Boyer RN  Outcome: Ongoing  4/5/2022 1501 by Ashlee Boyer RN  Outcome: Ongoing     Problem: Nutrition  Goal: Optimal nutrition therapy  4/6/2022 0233 by Les Leventhal, RN  Outcome: Ongoing  4/5/2022 1502 by Ashlee Boyer RN  Outcome: Ongoing  4/5/2022 1501 by Ashlee Boyer RN  Outcome: Ongoing

## 2022-04-06 NOTE — PROGRESS NOTES
Physical Therapy  Facility/Department: [facility] ACUTE REHAB  Daily Treatment Note  NAME: Courtney Tang  : 1950  MRN: [id number]    Date of Service: 2022    Discharge Recommendations:  Patient would benefit from continued therapy after discharge   PT Equipment Recommendations  Other: Pt has hospital bed, lift chair, rolling walker, and a Rollator at home. Assessment   Body structures, Functions, Activity limitations: Decreased functional mobility ; Decreased ADL status; Decreased ROM; Decreased strength;Decreased endurance;Decreased balance;Decreased posture; Increased pain  Treatment Diagnosis: Impaired functional mobility 2* ataxia  Specific instructions for Next Treatment: transfers, amb, balance, standing program  Prognosis: Good  PT Education: General Safety;Gait Training;Functional Mobility Training;Pressure Relief; Injury Prevention;Transfer Training; Adaptive Device Training;Energy Conservation;Precautions  Patient Education: Review of posture and STS  Barriers to Learning: none  REQUIRES PT FOLLOW UP: Yes  Activity Tolerance  Activity Tolerance: Patient limited by pain; Patient limited by endurance; Patient limited by fatigue     Patient Diagnosis(es): There were no encounter diagnoses.      has a past medical history of Allergic rhinitis, cause unspecified, Back pain, Bowel obstruction (HCC), C. difficile diarrhea, CAD (coronary artery disease), Cardiac murmur, Cellulitis, Cellulitis, Cerebral artery occlusion with cerebral infarction ([facility] [address] COVID-19, Diverticulosis of colon (without mention of hemorrhage), GERD (gastroesophageal reflux disease), GERD (gastroesophageal reflux disease), History of blood transfusion, History of CHF (congestive heart failure), History of MI (myocardial infarction), History of ovarian cyst, History of peritonitis, HTN (hypertension), Hx of blood clots, Hyperlipidemia, Intestinal or peritoneal adhesions with obstruction (postoperative) (postinfection) ([address] Kidney infection, Lateral epicondylitis  of elbow, MDRO (multiple drug resistant organisms) resistance, Muscle strain, Other abnormal glucose, PONV (postoperative nausea and vomiting), Pre-diabetes, Restless legs syndrome (RLS), Snores, 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 ischemic attack), Uses walker, Vitamin D deficiency, Wears glasses, and Wellness examination. has a past surgical history that includes Ovary removal (1970); colectomy (5588); Appendectomy (1968); Ovary removal (1971); Hysterectomy (1973); Bunionectomy (Left); sinus surgery (2004); Colonoscopy; other surgical history (08/14/2014); cyst removal (Right); Wrist fracture surgery (Left, 03/05/2019); Upper gastrointestinal endoscopy (N/A, 05/31/2019); Upper gastrointestinal endoscopy (N/A, 08/05/2019); Upper gastrointestinal endoscopy (N/A, 08/23/2019); Abdomen surgery (1976); lumbar fusion (N/A, 02/10/2020); Cardiac catheterization; Cardiac catheterization (2005); [place] tooth extraction; lumbar fusion (N/A, 06/17/2020); back surgery; cervical fusion (05/21/2021); and cervical fusion (N/A, 5/21/2021). Restrictions  Restrictions/Precautions  Restrictions/Precautions: General Precautions,Fall Risk  Required Braces or Orthoses?: No  Implants present? : Metal implants (cervical and lumbar surgeries, L wrist ORIF)  Position Activity Restriction  Other position/activity restrictions: up as tolerated; O2 3L/m via NC     Subjective   General  Chart Reviewed: Yes  Additional Pertinent Hx: TIA  Response To Previous Treatment: Patient with no complaints from previous session. Referring Practitioner: Dr Iain Domínguez. Subjective  Subjective: Patient reported feeling better about going home Friday, versus today. Pt reported that she forgot to put her O2 NC back on, after using the bathroom, yesterday afternoon. She felt okay, so the pt was left on room air yesterday afternoon.  Pt reports shortness of breath with less activity, compared to Monday (with O2 via 2-3L/min), SpO2 stayed at or above 90% during PT today. General Comment  Comments: Pt was able to recall the suggested common everyday occurrences (turning a page while reading, starting a level on her game, someone calling her name or talking to her) to use as cues/reminders to assess her posture, throughout the day  Pain Screening  Patient Currently in Pain: Yes  Pain Assessment  Pain Assessment: 0-10  Pain Level: 5  Pain Location: Neck  Pain Orientation: Left; Lower; Posterior  Pain Descriptors: Sore  Pain Frequency: Continuous  Clinical Progression: Gradually improving  Non-Pharmaceutical Pain Intervention(s): Repositioned;Rest;Distraction; Ambulation/Increased Activity; Emotional support  Response to Pain Intervention: Patient Satisfied  Vital Signs  Patient Currently in Pain: Yes  Oxygen Therapy  O2 Device: None (Room air)       Orientation  Orientation  Overall Orientation Status: Within Normal Limits    Objective   Bed mobility  Rolling to Left: Stand by assistance  Rolling to Right: Stand by assistance  Supine to Sit: Stand by assistance  Sit to Supine: Stand by assistance  Scooting: Contact guard assistance  Comment: bed mobility on mat table  Transfers  Sit to Stand: Contact guard assistance (w/ RW + VC's for technique)  Stand to sit: Contact guard assistance (w/ RW + VC's for technique)  Bed to Chair: Contact guard assistance;Minimal assistance (w/ RW + VC's for technique)  Stand Pivot Transfers: Contact guard assistance;Minimal Assistance (w/ RW + VC's for technique)  Comment: Hx of pushing backwards and relying on back of legs for support against sitting surface.  Does much better when cued to scoot forward to edge of chair/bed, position her feet, and to bring her \"nose over her toes\"  Ambulation  Ambulation?: Yes  Ambulation 1  Surface: level tile  Device: Rolling Walker  Other Apparatus: Wheelchair follow  Assistance: Contact guard assistance;Minimal assistance (grossly CGA, Marilyn to help manage the RW, keeping it close and from drifting to the left)  Quality of Gait: slow tawanda with step to pattern, increase tone in LLE causing decrease knee flexion and flat foot LE at contact with amb, decreased stance on L LE; downward gaze and cervical flexion; slight drift to L side (cues to correct all, with fair but fleeting response from pt)  Gait Deviations: Slow Tawanda;Decreased step length;Decreased step height  Distance: 100 ft + 100 ft (after a short seated rest break) (100 ft  2MWT)  Comments: SpO2 90%-93% on room air  Stairs/Curb  Stairs?: Yes  Stairs  # Steps : 10  Stairs Height:  (4\" & 6\")  Rails: Bilateral  Device: No Device  Assistance: Contact guard assistance  Comment: Pt able to verbalized and demonstrate correct step to sequencing. CGA. Slight circumduction of LLE with ascending steps. VC's to correct     Balance  Posture: Fair  Sitting - Static: Good  Sitting - Dynamic: Fair  Standing - Static: Fair;+  Standing - Dynamic: Fair  Comments: Standing with RW. Other exercises  Other exercises?: Yes  Other exercises 1: Seated Ex: BLE x15  Other exercises 2: NuStep x 10 mins. Other exercises 3: Standing Ex: RLE x10  Other exercises 9: Review of posture  Other exercises 10: STS transfers: x8 (greatly improved, pushing up, instead of pushing w/c back)       Goals  Short term goals  Time Frame for Short term goals: 1 week  Short term goal 1: Pt to demostrate bed mobility CGA/Jennifer. Short term goal 2: Pt to perform transfers CGA. Short term goal 3: Pt to amb 100'-150' with device, CGA. Short term goal 4: Pt to improve BLE strength by 1/2 MMG. Short term goal 5: Pt to improve standing balance to [facility]. Short term goal 6: Pt able to perform 4\" and 6\" steps x3 in //bars or acute stair way, with 2 B UE support, min A  Long term goals  Time Frame for Long term goals : By DC  Long term goal 1: Pt able to perform bed mobility at SBA  Long term goal 2: Pt able to transfer at supervsion level.   Long term goal 3: Pt able to ambulate house hold distances with assistive device mod-I  Long term goal 4:  Pt able to ambulate distance of 150 ft, level surfaces and shorter distance outside terraine at SBA. Long term goal 5: Pt able to propel w/c on level surface, distance of 150 ft, supervsion level. Long term goal 6: Improve 2MWT distance to atleast 120 ft to improve overall fucntion. Long term goal 7: Pt to improve Tinetti balance score to atlest 20/28 to reduce fall risk. Long term goal 8: Pt able to perform curb step with B UE support and/or donny to goup and down 4 to 5 steps with 2 rails at min A   Patient Goals   Patient goals : To get stronger    Plan    Plan  Times per week: 1.5 hr/day, 5 to 7 days/week. Specific instructions for Next Treatment: transfers, amb, balance, standing program  Current Treatment Recommendations: Strengthening,Balance Training,Functional Mobility Training,Transfer Training,Endurance Training,Gait Training,Equipment Evaluation, Education, & procurement,Patient/Caregiver Education & Training,Safety Education & Training,Stair training,Wheelchair Mobility Training  Safety Devices  Type of devices:  All fall risk precautions in place,Gait belt,Left in chair,Call light within reach,Patient at risk for falls     Therapy Time     04/06/22 1005 04/06/22 1332   PT Individual Minutes   Time In 1005 1332   Time Out 1105 1408   Minutes [id number] Michael Ruano, [facility]

## 2022-04-06 NOTE — PROGRESS NOTES
Physical Medicine & Rehabilitation  Progress Note      Subjective:      Kathryn Martin is a 71 y.o. female with cervical myelopathy. She reports doing fine today. She is on room air at the time of evaluation and states that it was removed at 4pm yesterday. She denies any shortness of breath. She notes some ongoing chest pain at the site of previous rib fractures and where she hit when she had fallen recently. She also notes continued dry cough. She denies any other acute concerns. ROS:  Denies fevers, chills, sweats. +chest pain; No palpitations, lightheadedness. +coughing; No wheezing or shortness of breath. Denies abdominal pain, nausea, diarrhea or constipation. No new areas of joint pain. Denies new areas of numbness or weakness. Denies new anxiety or depression issues. No new skin problems. Rehabilitation:   Progressing in therapies. PT:  Restrictions/Precautions: General Precautions,Fall Risk  Implants present? : Metal implants (cervical and lumbar surgeries, L wrist ORIF)  Other position/activity restrictions: up as tolerated; O2 3L/m via NC   Transfers  Sit to Stand: Contact guard assistance (w/ RW + VC's for technique)  Stand to sit: Contact guard assistance (w/ RW + VC's for technique)  Bed to Chair: Contact guard assistance,Minimal assistance (w/ RW + VC's for technique)  Stand Pivot Transfers: Contact guard assistance,Minimal Assistance (w/ RW + VC's for technique)  Comment: Hx of pushing backwards and relying on back of legs for support against sitting surface.  Does much better when cued to scoot forward to edge of chair/bed, position her feet, and to bring her \"nose over her toes\"  Ambulation 1  Surface: level tile  Device: Rolling Walker  Other Apparatus: Wheelchair follow  Assistance: Contact guard assistance,Minimal assistance (grossly CGA, Marilyn to help manage the RW, keeping it close and from drifting to the left)  Quality of Gait: slow tawanda with step to pattern, increase tone in LLE causing decrease knee flexion and flat foot LE at contact with amb, decreased stance on L LE; downward gaze and cervical flexion; slight drift to L side (cues to correct all, with fair but fleeting response from pt)  Gait Deviations: Slow Tawanda,Decreased step length,Decreased step height  Distance: 100 ft + 100 ft (after a short seated rest break) (100 ft  2MWT)  Comments: SpO2 90%-93% on room air    Transfers  Sit to Stand: Contact guard assistance (w/ RW + VC's for technique)  Stand to sit: Contact guard assistance (w/ RW + VC's for technique)  Bed to Chair: Contact guard assistance,Minimal assistance (w/ RW + VC's for technique)  Stand Pivot Transfers: Contact guard assistance,Minimal Assistance (w/ RW + VC's for technique)  Comment: Hx of pushing backwards and relying on back of legs for support against sitting surface.  Does much better when cued to scoot forward to edge of chair/bed, position her feet, and to bring her \"nose over her toes\"  Ambulation  Ambulation?: Yes  More Ambulation?: Yes  Ambulation 1  Surface: level tile  Device: Rolling Walker  Other Apparatus: Wheelchair follow  Assistance: Contact guard assistance,Minimal assistance (grossly CGA, Marilyn to help manage the RW, keeping it close and from drifting to the left)  Quality of Gait: slow tawanda with step to pattern, increase tone in LLE causing decrease knee flexion and flat foot LE at contact with amb, decreased stance on L LE; downward gaze and cervical flexion; slight drift to L side (cues to correct all, with fair but fleeting response from pt)  Gait Deviations: Slow Tawanda,Decreased step length,Decreased step height  Distance: 100 ft + 100 ft (after a short seated rest break) (100 ft  2MWT)  Comments: SpO2 90%-93% on room air    Surface: level tile  Ambulation 1  Surface: level tile  Device: Rolling Walker  Other Apparatus: Wheelchair follow  Assistance: Contact guard assistance,Minimal assistance (grossly CGA, Marilyn to help manage the RW, keeping it close and from drifting to the left)  Quality of Gait: slow tawanda with step to pattern, increase tone in LLE causing decrease knee flexion and flat foot LE at contact with amb, decreased stance on L LE; downward gaze and cervical flexion; slight drift to L side (cues to correct all, with fair but fleeting response from pt)  Gait Deviations: Slow Tawanda,Decreased step length,Decreased step height  Distance: 100 ft + 100 ft (after a short seated rest break) (100 ft  2MWT)  Comments: SpO2 90%-93% on room air    OT:  ADL  Equipment Provided: Reacher,Long-handled sponge  Feeding: Modified independent  (Per pt report)  Grooming: Stand by assistance (standing at sink)  UE Bathing: Stand by assistance (standing at sink)  LE Bathing: Stand by assistance (standing at sink for perineal area only)  UE Dressing: Stand by assistance (standing at sink)  LE Dressing: Stand by assistance (with assist for PRAVEEN hose only)  Toileting: Stand by assistance  Additional Comments: OT facilitated patient in self-care routine at [facility] this date. OT and Patient engaged in collaborative discussion regarding patient's routine at home for sponge bathing at sink on days she does not shower. Patient reports that she stands at sink and sits down on toilet or lift chair in her bedroom when she needs a break. Patient states a chair will not fit in the bathroom for seated bathing at the sink and there is no other location that she can bathe in a seated position. Patient agreeable to simulate her self-care routine in preparation for returning home. Patient stood at sink for 5-6 minutes x 2 with seated rest break on toilet PRN. Patient reports feeling more confident for discharge and states \"I can do it. \"   Instrumental ADL's  Instrumental ADLs: Yes     Balance  Sitting Balance: Modified independent   Standing Balance: Stand by assistance   Standing Balance  Time: 5+ minutes  Activity: self-care routine at sink, gathering clothes in room, cleaning up clothes in bathroom  Comment: 1-2 UE support  Functional Mobility  Functional - Mobility Device: Rolling Walker  Activity: To/from bathroom  Assist Level: Stand by assistance  Functional Mobility Comments: with Minimal verbal cues for initiation, pacing, and sequencing     Bed mobility  Rolling to Left: Stand by assistance  Rolling to Right: Stand by assistance  Supine to Sit: Stand by assistance  Sit to Supine: Stand by assistance  Scooting: Contact guard assistance  Comment: bed mobility on mat table  Transfers  Stand Pivot Transfers: Contact guard assistance  Sit to stand: Stand by assistance  Stand to sit: Stand by assistance  Transfer Comments: with Moderate verbal cues for counting, rocking, and initiation strategies   Toilet Transfers  Toilet - Technique: Ambulating  Equipment Used: Grab bars  Toilet Transfer: Stand by assistance  Toilet Transfers Comments: with RW     Shower Transfers  Shower - Transfer From: Wheelchair  Shower - Transfer Type: To and From  Shower - Transfer To: Transfer tub bench  Shower - Technique: Stand pivot  Shower Transfers: Contact Guard  Shower Transfers Comments: Patient declines shower this date and bathes at sink  Wheelchair [employer]  Wheelchair/Bed - Technique: Stand pivot  Equipment Used: Other (RW)  Level of Asssistance:  Moderate assistance  Wheelchair Transfers Comments: with RW; Blocking of L foot due to slipping    SPEECH:      Current Medications:   Current Facility-Administered Medications: sodium chloride (OCEAN, BABY AYR) 0.65 % nasal spray 1 spray, 1 spray, Each Nostril, PRN  albuterol (PROVENTIL) nebulizer solution 2.5 mg, 2.5 mg, Nebulization, Q6H PRN  ipratropium-albuterol (DUONEB) nebulizer solution 1 ampule, 1 ampule, Inhalation, 4x daily  losartan (COZAAR) tablet 100 mg, 100 mg, Oral, Daily  amLODIPine (NORVASC) tablet 5 mg, 5 mg, Oral, Daily  guaiFENesin (ROBITUSSIN) 100 MG/5ML liquid 100 mg, 100 mg, Oral, BID PRN  traMADol (ULTRAM) tablet 50 mg, 50 mg, Oral, Q6H PRN  gabapentin (NEURONTIN) capsule 200 mg, 200 mg, Oral, BID  melatonin tablet 6 mg, 6 mg, Oral, Nightly PRN  lidocaine 4 % external patch 1 patch, 1 patch, TransDERmal, Daily  busPIRone (BUSPAR) tablet 5 mg, 5 mg, Oral, TID  magnesium hydroxide (MILK OF MAGNESIA) 400 MG/5ML suspension 30 mL, 30 mL, Oral, Daily PRN  apixaban (ELIQUIS) tablet 5 mg, 5 mg, Oral, BID  atorvastatin (LIPITOR) tablet 40 mg, 40 mg, Oral, Nightly  calcium carbonate w/vitamin D (CALTRATE) 600-400 MG-UNIT per tab 1 tablet, 1 tablet, Oral, Daily  carvedilol (COREG) tablet 12.5 mg, 12.5 mg, Oral, BID  citalopram (CELEXA) tablet 20 mg, 20 mg, Oral, Daily  docusate sodium (COLACE) capsule 100 mg, 100 mg, Oral, BID PRN  fenofibrate (TRIGLIDE) tablet 160 mg, 160 mg, Oral, Daily  ferrous sulfate (IRON 325) tablet 325 mg, 325 mg, Oral, BID  furosemide (LASIX) tablet 20 mg, 20 mg, Oral, BID  pramipexole (MIRAPEX) tablet 0.5 mg, 0.5 mg, Oral, Nightly  vitamin B-12 (CYANOCOBALAMIN) tablet 1,000 mcg, 1,000 mcg, Oral, Daily  acetaminophen (TYLENOL) tablet 650 mg, 650 mg, Oral, Q4H PRN  polyethylene glycol (GLYCOLAX) packet 17 g, 17 g, Oral, Daily  senna (SENOKOT) tablet 17.2 mg, 2 tablet, Oral, Daily PRN  bisacodyl (DULCOLAX) suppository 10 mg, 10 mg, Rectal, Daily PRN      Objective:  BP (!) 87/36   Pulse 63   Temp 98.4 °F (36.9 °C) (Oral)   Resp 18   Ht 5' 3\" (1.6 m)   Wt 180 lb 6.4 oz (81.8 kg)   SpO2 92%   BMI 31.96 kg/m²       GEN: Well developed, well nourished, no acute distress  HEENT: NCAT. EOM grossly intact. Hearing grossly intact. Mucous membranes pink and moist.  RESP: Normal breath sounds with no wheezing, rales, or rhonchi. Respirations WNL and unlabored. CV: Regular rate and rhythm. No murmurs, rubs, or gallops. ABD: Soft, non-distended, BS+ and equal.  NEURO: Alert. Speech fluent. Sensation to light touch intact in bilateral lower limbs.   MSK:  Muscle tone and bulk are normal bilaterally. Strength 4+/5 in bilateral lower limbs. LIMBS: No edema in bilateral lower limbs. SKIN: Warm and dry with good turgor. PSYCH: Mood WNL. Affect WNL. Appropriately interactive. Diagnostics:     CBC: No results for input(s): WBC, RBC, HGB, HCT, MCV, RDW, PLT in the last 72 hours. BMP:   No results for input(s): NA, K, CL, CO2, PHOS, BUN, CREATININE, CA, GLUCOSE in the last 72 hours. BNP: No results for input(s): BNP in the last 72 hours. PT/INR: No results for input(s): PROTIME, INR in the last 72 hours. APTT: No results for input(s): APTT in the last 72 hours. CARDIAC ENZYMES: No results for input(s): CKMB, CKMBINDEX, TROPONINT in the last 72 hours. Invalid input(s): CKTOTAL;3  FASTING LIPID PANEL:  Lab Results   Component Value Date    CHOL 103 05/20/2019    HDL 74 03/12/2021    TRIG 66 05/20/2019     LIVER PROFILE: No results for input(s): AST, ALT, ALB, BILIDIR, BILITOT, ALKPHOS in the last 72 hours. Impression/Plan:   Impaired ADLs, gait, and mobility due to:    1. Cervical myelopathy:  S/p previous cervical decompression. With ataxia. PT/OT  for gait, mobility, strengthening, endurance, ADLs, and self care. Pain control with gabapentin, as-needed tylenol, as-needed tramadol. 2. Suspected costochondritis:  Pain control with as-needed tramadol, as-needed tylenol. 3. Bronchitis:  Completed course of steroids and zithromax. On scheduled duo-neb. Has robitussin as needed. 4. Anemia:  Hemoglobin 10.7 on 3/31, stable. Monitoring. On oral iron supplementation. 5. Lower limb cellulitis:  Resolved. Completed course of keflex. 6. CAD:  On atorvastatin, fenofibrate  7. Chronic diastolic heart failure: On lasix  8. HTN:  On amlodipine, carvedilol, losartan  9. Chronic DVT:  On eliquis  10. Depression: On celexa. IM added buspirone. Patient previously declined psych evaluation. 11. Restless leg syndrome: On pramipexole  12. Nasal congestion, dryness:   Added saline nasal spray as needed on 4/4. 13. Bowel Management: Miralax daily, senokot prn, dulcolax prn, docusate BID prn, milk of magnesia prn  14. DVT prophylaxis:  On eliquis  15.  Internal Medicine for medical management      Electronically signed by Lakeisha Suárez MD on 4/7/2022 at 12:06 AM

## 2022-04-06 NOTE — PROGRESS NOTES
[address] [facility]    ACUTE REHABILITATION OCCUPATIONAL THERAPY  DAILY NOTE    Date: 22  Patient Name: Alfonso Mullen      Room: 9071/7216-24    MRN: [id number]   : 1950  (71 y.o.)  Gender: female   Referring Practitioner: Luiz Palacios MD  Diagnosis: Cervical myelopathy  Additional Pertinent Hx: 71 y.o.  female, with a history of anemia, spondylolisthesis, chronic DVT, chronic diastolic heart failure, CVA, major depressive disorder, s/p cervical spine fusion, stenosis of cervical spine with myelopathy, and DM type II, who presents with leg pain, shortness of breath, fall, and neck pain. According to patient and her , patient has had multiple falls recently including a fall Monday and evening and again on Tuesday. Patient reports that today she felt extremely weak upon waking. Restrictions  Restrictions/Precautions: General Precautions,Fall Risk  Implants present? : Metal implants (cervical and lumbar surgeries, L wrist ORIF)  Other position/activity restrictions: up as tolerated  Required Braces or Orthoses?: No  Equipment Used: Other (RW)    Subjective  Subjective: \"No, I feel like I'm ready. I am ready to go home on Friday\" Patient reports that she used the RW with nursing staff all night for toileting and feels more prepared for returning home on Friday. Patient Currently in Pain: Yes  Pain Level: 5  Pain Location: Chest  Pain Orientation: Anterior  Restrictions/Precautions: General Precautions; Fall Risk        Pain Assessment  Pain Assessment: 0-10  Pain Level: 5  Pain Type: Acute pain  Pain Location: Chest  Pain Orientation: Anterior  Pain Descriptors: Sore  Pain Frequency: Continuous    Objective     Balance  Sitting Balance: Modified independent   Standing Balance: Stand by assistance  Transfers  Sit to stand: Stand by assistance  Stand to sit: Stand by assistance  Transfer Comments: with Moderate verbal cues for counting, rocking, and initiation strategies  Standing Balance  Time: 5+ minutes  Activity: self-care routine at sink, gathering clothes in room, cleaning up clothes in bathroom  Functional Mobility  Functional - Mobility Device: Rolling Walker  Activity: To/from bathroom  Assist Level: Stand by assistance  Functional Mobility Comments: with Minimal verbal cues for initiation, pacing, and sequencing  Toilet Transfers  Toilet - Technique: Ambulating  Equipment Used: Grab bars  Toilet Transfer: Stand by assistance  Toilet Transfers Comments: with RW  Shower Transfers  Shower Transfers Comments: Patient declines shower this date and bathes at sink     Type of ROM/Therapeutic Exercise  Type of ROM/Therapeutic Exercise: Resistive Bands  Comment: OT provided patient education, demonstration, and handout regarding home exercise program for B UE strengthening with green resistive band. Education provided regarding tension in band, safety with exercises, and to not do any exercises that cause pain. Patient verbalizes/demonstrates Good understanding. Further education warranted to maximize carryover for discharge home. Exercises  Shoulder Flexion: 2 sets of 10 reps  Elbow Flexion: 2 sets of 10 reps  Elbow Extension: 2 sets of 10 reps              ADL  Equipment Provided: Reacher;Long-handled sponge  Feeding: Modified independent  (Per pt report)  Grooming: Stand by assistance (standing at sink)  UE Bathing: Stand by assistance (standing at sink)  LE Bathing: Stand by assistance (standing at sink for perineal area only)  UE Dressing: Stand by assistance (standing at sink)  LE Dressing: Stand by assistance (with assist for PRAVEEN hose only)  Toileting: Stand by assistance  Additional Comments: OT facilitated patient in self-care routine at sink this date. OT and Patient engaged in collaborative discussion regarding patient's routine at home for sponge bathing at sink on days she does not shower.  Patient reports that she stands at sink and sits down on toilet or lift chair in her bedroom when she needs a break. Patient states a chair will not fit in the bathroom for seated bathing at the sink and there is no other location that she can bathe in a seated position. Patient agreeable to simulate her self-care routine in preparation for returning home. Patient stood at sink for 5-6 minutes x 2 with seated rest break on toilet PRN. Patient reports feeling more confident for discharge and states \"I can do it. \"     Instrumental ADL's  Instrumental ADLs: Yes  Light Housekeeping  Light Housekeeping Level: Alvin Spearing Housekeeping Level of Assistance: Stand by assistance  Light Housekeeping: OT facilitated patient engagement in light housekeeping task of picking up dirty clothes in bathroom, transporting to closet, and putting away. Patient engaged in task with SBA and Moderate verbal cues for safety during task. Assessment  Performance deficits / Impairments: Decreased functional mobility ; Decreased ADL status; Decreased strength;Decreased endurance;Decreased sensation;Decreased balance;Decreased high-level IADLs;Decreased fine motor control;Decreased coordination  Prognosis: Good  Discharge Recommendations: 24 hour supervision or assist;Home with Home health OT  Activity Tolerance: Patient Tolerated treatment well  Safety Devices in place: Yes  Type of devices: Left in chair;Call light within reach; Patient at risk for falls          Patient Education: OT POC, simulation of self-care session to mimic home environment, home exercise program  Patient Goals   Patient goals :  \"To be able to put my pants/socks on better\"  Learner:patient  Method: demonstration, explanation and handout       Outcome: needs reinforcement and asked questions        Plan  Plan  Times per week: 5-7  Times per day: Twice a day  Current Treatment Recommendations: Balance Training,Functional Mobility Training,Endurance Training,Strengthening,ROM,Safety Education & Training,Patient/Caregiver Education & Training,Equipment Evaluation, Education, & procurement,Self-Care / ADL,Pain Management  Patient Goals   Patient goals :  \"To be able to put my pants/socks on better\"  Short term goals  Time Frame for Short term goals: 1 week   Short term goal 1: Pt will complete LB dressing/bathing/toileting CGA with Good safety with use of AE/DME/modified techniques for increased IND with self care  Short term goal 2: Pt will participate in 30+ minutes functional activity for increased strength/balance/endurance for increased IND in ADL's  Short term goal 3: Pt will complete transfers/functional mobility CGA with Good safety and RW for increased IND in ADL's  Short term goal 4: Pt will verbalize/demonstrate Good understanding of AE/DME/modified techniques for increased IND in self care  Short term goal 5: Pt will complete UB bathing/dressing/grooming with Supervision for increased IND in self care  Long term goals  Time Frame for Long term goals : by discharge   Long term goal 1: Pt will complete toileting/grooming/dressing Mod I and bathing with Supervison with Good safety with use of AE/DME/modified techniques for increased IND with self care  Long term goal 2: Pt will complete functional mobility/transferes during functional activity Mod I with Good safety and RW for increased IND in ADL's  Long term goal 3: Pt will verbalize/demonstrate Good understanding of fall prevention strategies for increased safety/IND in self care  Long term goal 4: Pt will improve L hand strength for self care as evidence by a 3# improvement in dynomometor testing   Long term goal 5: Pt will improve L FMC as evidence by a 20 second improvement on 9 hole peg test for increased IND with self care     04/06/22 0806 04/06/22 1118   OT Individual Minutes   Time In 1177 [id number]   Time Out 0908 1144   Minutes 62 26   Time Code Minutes    Timed Code Treatment Minutes 62 Minutes 26 Minutes     Electronically signed by Ivanna Kimbrough OT on 4/6/22 at 12:56 PM EDT

## 2022-04-07 LAB
ABSOLUTE EOS #: 0.8 K/UL (ref 0–0.4)
ABSOLUTE LYMPH #: 2.2 K/UL (ref 1–4.8)
ABSOLUTE MONO #: 0.5 K/UL (ref 0.1–1.3)
ANION GAP SERPL CALCULATED.3IONS-SCNC: 12 MMOL/L (ref 9–17)
BASOPHILS # BLD: 1 % (ref 0–2)
BASOPHILS ABSOLUTE: 0.1 K/UL (ref 0–0.2)
BUN BLDV-MCNC: 61 MG/DL (ref 8–23)
CALCIUM SERPL-MCNC: 8.9 MG/DL (ref 8.6–10.4)
CHLORIDE BLD-SCNC: 104 MMOL/L (ref 98–107)
CO2: 25 MMOL/L (ref 20–31)
CREAT SERPL-MCNC: 1.68 MG/DL (ref 0.5–0.9)
EOSINOPHILS RELATIVE PERCENT: 11 % (ref 0–4)
GFR AFRICAN AMERICAN: 36 ML/MIN
GFR NON-AFRICAN AMERICAN: 30 ML/MIN
GFR SERPL CREATININE-BSD FRML MDRD: ABNORMAL ML/MIN/{1.73_M2}
GLUCOSE BLD-MCNC: 129 MG/DL (ref 70–99)
HCT VFR BLD CALC: 35.3 % (ref 36–46)
HEMOGLOBIN: 11.7 G/DL (ref 12–16)
LYMPHOCYTES # BLD: 29 % (ref 24–44)
MCH RBC QN AUTO: 30.8 PG (ref 26–34)
MCHC RBC AUTO-ENTMCNC: 33.2 G/DL (ref 31–37)
MCV RBC AUTO: 92.8 FL (ref 80–100)
MONOCYTES # BLD: 7 % (ref 1–7)
PDW BLD-RTO: 13.7 % (ref 11.5–14.9)
PLATELET # BLD: 361 K/UL (ref 150–450)
PMV BLD AUTO: 7 FL (ref 6–12)
POTASSIUM SERPL-SCNC: 4.8 MMOL/L (ref 3.7–5.3)
RBC # BLD: 3.8 M/UL (ref 4–5.2)
SEG NEUTROPHILS: 52 % (ref 36–66)
SEGMENTED NEUTROPHILS ABSOLUTE COUNT: 4 K/UL (ref 1.3–9.1)
SODIUM BLD-SCNC: 141 MMOL/L (ref 135–144)
WBC # BLD: 7.6 K/UL (ref 3.5–11)

## 2022-04-07 PROCEDURE — 97535 SELF CARE MNGMENT TRAINING: CPT

## 2022-04-07 PROCEDURE — 94640 AIRWAY INHALATION TREATMENT: CPT

## 2022-04-07 PROCEDURE — 97116 GAIT TRAINING THERAPY: CPT

## 2022-04-07 PROCEDURE — 80048 BASIC METABOLIC PNL TOTAL CA: CPT

## 2022-04-07 PROCEDURE — 85025 COMPLETE CBC W/AUTO DIFF WBC: CPT

## 2022-04-07 PROCEDURE — 99231 SBSQ HOSP IP/OBS SF/LOW 25: CPT | Performed by: STUDENT IN AN ORGANIZED HEALTH CARE EDUCATION/TRAINING PROGRAM

## 2022-04-07 PROCEDURE — 6370000000 HC RX 637 (ALT 250 FOR IP): Performed by: NURSE PRACTITIONER

## 2022-04-07 PROCEDURE — 97110 THERAPEUTIC EXERCISES: CPT

## 2022-04-07 PROCEDURE — 99232 SBSQ HOSP IP/OBS MODERATE 35: CPT | Performed by: INTERNAL MEDICINE

## 2022-04-07 PROCEDURE — 97530 THERAPEUTIC ACTIVITIES: CPT

## 2022-04-07 PROCEDURE — 36415 COLL VENOUS BLD VENIPUNCTURE: CPT

## 2022-04-07 PROCEDURE — 6370000000 HC RX 637 (ALT 250 FOR IP): Performed by: PHYSICAL MEDICINE & REHABILITATION

## 2022-04-07 PROCEDURE — 6370000000 HC RX 637 (ALT 250 FOR IP): Performed by: INTERNAL MEDICINE

## 2022-04-07 PROCEDURE — 94761 N-INVAS EAR/PLS OXIMETRY MLT: CPT

## 2022-04-07 PROCEDURE — 1180000000 HC REHAB R&B

## 2022-04-07 RX ORDER — ALBUTEROL SULFATE 90 UG/1
2 AEROSOL, METERED RESPIRATORY (INHALATION) 4 TIMES DAILY PRN
Qty: 18 G | Refills: 1 | Status: SHIPPED | OUTPATIENT
Start: 2022-04-07 | End: 2022-04-10 | Stop reason: HOSPADM

## 2022-04-07 RX ORDER — LIDOCAINE 4 G/G
1 PATCH TOPICAL DAILY PRN
COMMUNITY
Start: 2022-04-07 | End: 2022-06-05

## 2022-04-07 RX ORDER — LANOLIN ALCOHOL/MO/W.PET/CERES
6 CREAM (GRAM) TOPICAL NIGHTLY PRN
COMMUNITY
Start: 2022-04-07

## 2022-04-07 RX ORDER — ACETAMINOPHEN 325 MG/1
650 TABLET ORAL EVERY 4 HOURS PRN
COMMUNITY
Start: 2022-04-07 | End: 2022-09-09

## 2022-04-07 RX ORDER — FUROSEMIDE 20 MG/1
20 TABLET ORAL 2 TIMES DAILY
Qty: 60 TABLET | Refills: 0 | Status: SHIPPED | OUTPATIENT
Start: 2022-04-07 | End: 2022-04-11 | Stop reason: SDUPTHER

## 2022-04-07 RX ORDER — ATORVASTATIN CALCIUM 40 MG/1
40 TABLET, FILM COATED ORAL NIGHTLY
Qty: 30 TABLET | Refills: 0 | Status: SHIPPED | OUTPATIENT
Start: 2022-04-07 | End: 2022-04-10 | Stop reason: SDUPTHER

## 2022-04-07 RX ORDER — BUSPIRONE HYDROCHLORIDE 5 MG/1
5 TABLET ORAL 3 TIMES DAILY
Qty: 90 TABLET | Refills: 0 | Status: SHIPPED | OUTPATIENT
Start: 2022-04-07 | End: 2022-04-10

## 2022-04-07 RX ORDER — CITALOPRAM 20 MG/1
20 TABLET ORAL DAILY
Qty: 30 TABLET | Refills: 0 | Status: SHIPPED | OUTPATIENT
Start: 2022-04-07 | End: 2022-04-10 | Stop reason: SDUPTHER

## 2022-04-07 RX ORDER — LOSARTAN POTASSIUM 100 MG/1
100 TABLET ORAL DAILY
Qty: 30 TABLET | Refills: 0 | Status: SHIPPED | OUTPATIENT
Start: 2022-04-08 | End: 2022-04-10

## 2022-04-07 RX ORDER — CARVEDILOL 12.5 MG/1
12.5 TABLET ORAL 2 TIMES DAILY
Qty: 60 TABLET | Refills: 0 | Status: SHIPPED | OUTPATIENT
Start: 2022-04-07 | End: 2022-04-10 | Stop reason: SDUPTHER

## 2022-04-07 RX ORDER — GABAPENTIN 100 MG/1
200 CAPSULE ORAL 2 TIMES DAILY
Qty: 120 CAPSULE | Refills: 0 | Status: SHIPPED | OUTPATIENT
Start: 2022-04-07 | End: 2022-04-10

## 2022-04-07 RX ORDER — PRAMIPEXOLE DIHYDROCHLORIDE 0.5 MG/1
0.5 TABLET ORAL NIGHTLY
Qty: 30 TABLET | Refills: 0 | Status: SHIPPED | OUTPATIENT
Start: 2022-04-07 | End: 2022-04-10 | Stop reason: SDUPTHER

## 2022-04-07 RX ORDER — TRAMADOL HYDROCHLORIDE 50 MG/1
50 TABLET ORAL EVERY 12 HOURS PRN
Qty: 14 TABLET | Refills: 0 | Status: SHIPPED | OUTPATIENT
Start: 2022-04-07 | End: 2022-04-10

## 2022-04-07 RX ORDER — AMLODIPINE BESYLATE 5 MG/1
5 TABLET ORAL DAILY
Qty: 30 TABLET | Refills: 0 | Status: SHIPPED | OUTPATIENT
Start: 2022-04-07 | End: 2022-04-10 | Stop reason: SDUPTHER

## 2022-04-07 RX ORDER — FENOFIBRATE 134 MG/1
134 CAPSULE ORAL
Qty: 30 CAPSULE | Refills: 0 | Status: SHIPPED | OUTPATIENT
Start: 2022-04-07 | End: 2022-04-10 | Stop reason: SDUPTHER

## 2022-04-07 RX ADMIN — IPRATROPIUM BROMIDE AND ALBUTEROL SULFATE 1 AMPULE: 2.5; .5 SOLUTION RESPIRATORY (INHALATION) at 14:32

## 2022-04-07 RX ADMIN — IPRATROPIUM BROMIDE AND ALBUTEROL SULFATE 1 AMPULE: 2.5; .5 SOLUTION RESPIRATORY (INHALATION) at 07:05

## 2022-04-07 RX ADMIN — ATORVASTATIN CALCIUM 40 MG: 40 TABLET, FILM COATED ORAL at 21:57

## 2022-04-07 RX ADMIN — IPRATROPIUM BROMIDE AND ALBUTEROL SULFATE 1 AMPULE: 2.5; .5 SOLUTION RESPIRATORY (INHALATION) at 11:19

## 2022-04-07 RX ADMIN — AMLODIPINE BESYLATE 5 MG: 5 TABLET ORAL at 11:08

## 2022-04-07 RX ADMIN — IPRATROPIUM BROMIDE AND ALBUTEROL SULFATE 1 AMPULE: 2.5; .5 SOLUTION RESPIRATORY (INHALATION) at 19:48

## 2022-04-07 RX ADMIN — GABAPENTIN 200 MG: 100 CAPSULE ORAL at 11:07

## 2022-04-07 RX ADMIN — FUROSEMIDE 20 MG: 20 TABLET ORAL at 11:08

## 2022-04-07 RX ADMIN — FERROUS SULFATE TAB 325 MG (65 MG ELEMENTAL FE) 325 MG: 325 (65 FE) TAB at 22:00

## 2022-04-07 RX ADMIN — LOSARTAN POTASSIUM 100 MG: 100 TABLET, FILM COATED ORAL at 11:07

## 2022-04-07 RX ADMIN — Medication 6 MG: at 21:57

## 2022-04-07 RX ADMIN — APIXABAN 5 MG: 5 TABLET, FILM COATED ORAL at 11:07

## 2022-04-07 RX ADMIN — APIXABAN 5 MG: 5 TABLET, FILM COATED ORAL at 21:57

## 2022-04-07 RX ADMIN — PRAMIPEXOLE DIHYDROCHLORIDE 0.5 MG: 0.25 TABLET ORAL at 21:57

## 2022-04-07 RX ADMIN — BUSPIRONE HYDROCHLORIDE 5 MG: 5 TABLET ORAL at 14:25

## 2022-04-07 RX ADMIN — CITALOPRAM HYDROBROMIDE 20 MG: 20 TABLET ORAL at 11:07

## 2022-04-07 RX ADMIN — CARVEDILOL 12.5 MG: 12.5 TABLET, FILM COATED ORAL at 21:57

## 2022-04-07 RX ADMIN — CYANOCOBALAMIN TAB 1000 MCG 1000 MCG: 1000 TAB at 11:10

## 2022-04-07 RX ADMIN — FUROSEMIDE 20 MG: 20 TABLET ORAL at 18:25

## 2022-04-07 RX ADMIN — BUSPIRONE HYDROCHLORIDE 5 MG: 5 TABLET ORAL at 21:58

## 2022-04-07 RX ADMIN — CALCIUM CARBONATE 600 MG (1,500 MG)-VITAMIN D3 400 UNIT TABLET 1 TABLET: at 11:07

## 2022-04-07 RX ADMIN — TRAMADOL HYDROCHLORIDE 50 MG: 50 TABLET, COATED ORAL at 21:56

## 2022-04-07 RX ADMIN — FENOFIBRATE 160 MG: 160 TABLET ORAL at 11:07

## 2022-04-07 RX ADMIN — CARVEDILOL 12.5 MG: 12.5 TABLET, FILM COATED ORAL at 11:07

## 2022-04-07 RX ADMIN — GABAPENTIN 200 MG: 100 CAPSULE ORAL at 21:57

## 2022-04-07 RX ADMIN — BUSPIRONE HYDROCHLORIDE 5 MG: 5 TABLET ORAL at 11:08

## 2022-04-07 RX ADMIN — FERROUS SULFATE TAB 325 MG (65 MG ELEMENTAL FE) 325 MG: 325 (65 FE) TAB at 11:06

## 2022-04-07 ASSESSMENT — PAIN DESCRIPTION - FREQUENCY: FREQUENCY: CONTINUOUS

## 2022-04-07 ASSESSMENT — PAIN DESCRIPTION - PROGRESSION: CLINICAL_PROGRESSION: GRADUALLY IMPROVING

## 2022-04-07 ASSESSMENT — PAIN SCALES - GENERAL
PAINLEVEL_OUTOF10: 0
PAINLEVEL_OUTOF10: 8
PAINLEVEL_OUTOF10: 4

## 2022-04-07 ASSESSMENT — PAIN DESCRIPTION - LOCATION
LOCATION: CHEST
LOCATION: NECK
LOCATION: CHEST

## 2022-04-07 ASSESSMENT — PAIN DESCRIPTION - ORIENTATION: ORIENTATION: LEFT;LOWER;POSTERIOR

## 2022-04-07 ASSESSMENT — PAIN DESCRIPTION - DESCRIPTORS
DESCRIPTORS: SORE
DESCRIPTORS: NAGGING

## 2022-04-07 ASSESSMENT — PAIN SCALES - WONG BAKER
WONGBAKER_NUMERICALRESPONSE: 0
WONGBAKER_NUMERICALRESPONSE: 4

## 2022-04-07 ASSESSMENT — PAIN DESCRIPTION - PAIN TYPE: TYPE: CHRONIC PAIN

## 2022-04-07 ASSESSMENT — 9 HOLE PEG TEST
TESTTIME_SECONDS: 30
TESTTIME_SECONDS: 48

## 2022-04-07 NOTE — CARE COORDINATION
ONGOING DISCHARGE PLAN:     Patient is alert and oriented x4.     Spoke with patient regarding discharge plan and patient confirms that plan is still going home with . Will continue with [facility] as PTA.  Juarez Núñez notified       Plan discharge tomorrow am.

## 2022-04-07 NOTE — FLOWSHEET NOTE
Patient worried and anxious about her medical issues; welcomed prayer;     04/06/22 2003   Encounter Summary   Services provided to: Patient   Referral/Consult From: Mike   Continue Visiting   (4/6/22)   Complexity of Encounter Moderate   Length of Encounter 15 minutes   Spiritual Assessment Completed Yes   Spiritual/Sikh   Type Spiritual support   Assessment Approachable; Anxious; Hopeful;Coping;Helplessness   Intervention Active listening;Explored feelings, thoughts, concerns;Prayer;Sustaining presence/ Ministry of presence; Discussed illness/injury and it's impact   Outcome Expressed gratitude;Engaged in conversation;Expressed feelings/needs/concerns;Coping; Hopeful;Receptive

## 2022-04-07 NOTE — PLAN OF CARE
Problem: Skin Integrity:  Goal: Will show no infection signs and symptoms  Description: Will show no infection signs and symptoms  4/7/2022 1656 by Jason Mckeon LPN  Outcome: Ongoing  4/7/2022 0307 by Bing Alanis RN  Outcome: Ongoing  Goal: Absence of new skin breakdown  Description: Absence of new skin breakdown  4/7/2022 1656 by Jason Mckeon LPN  Outcome: Ongoing  4/7/2022 0307 by Bing Alanis RN  Outcome: Ongoing     Problem: Falls - Risk of:  Goal: Will remain free from falls  Description: Will remain free from falls  4/7/2022 1656 by Jason Mckeon LPN  Outcome: Ongoing  4/7/2022 0307 by Bing Alanis RN  Outcome: Ongoing  Goal: Absence of physical injury  Description: Absence of physical injury  4/7/2022 1656 by Jason Mckeon LPN  Outcome: Ongoing  4/7/2022 0307 by Bing Alanis RN  Outcome: Ongoing     Problem: Pain:  Goal: Pain level will decrease  Description: Pain level will decrease  4/7/2022 1656 by Jason Mckeon LPN  Outcome: Ongoing  4/7/2022 0307 by Bing Alanis RN  Outcome: Ongoing  Goal: Control of acute pain  Description: Control of acute pain  4/7/2022 1656 by Jason Mckeon LPN  Outcome: Ongoing  4/7/2022 0307 by Bing Alanis RN  Outcome: Ongoing  Goal: Control of chronic pain  Description: Control of chronic pain  4/7/2022 1656 by Jason Mckeon LPN  Outcome: Ongoing  4/7/2022 0307 by Bing Alanis RN  Outcome: Ongoing     Problem: Mobility - Impaired:  Goal: Mobility will improve  Description: Mobility will improve  4/7/2022 1656 by Jason Mckeon LPN  Outcome: Ongoing  4/7/2022 0307 by Bing Alanis RN  Outcome: Ongoing     Problem: Musculor/Skeletal Functional Status  Goal: Highest potential functional level  4/7/2022 1656 by Jason Mckeon LPN  Outcome: Ongoing  4/7/2022 0307 by Bing Alanis RN  Outcome: Ongoing  Goal: Absence of falls  4/7/2022 1656 by Jason Mckeon LPN  Outcome: Ongoing  4/7/2022 0307 by Dioni Stallings MEENU Cole  Outcome: Ongoing     Problem: Musculor/Skeletal Functional Status  Goal: Highest potential functional level  4/7/2022 1656 by Chinmay Clayton LPN  Outcome: Ongoing  4/7/2022 0307 by Rudy Galvez RN  Outcome: Ongoing  Goal: Absence of falls  4/7/2022 1656 by Chinmay Clayton LPN  Outcome: Ongoing  4/7/2022 0307 by Rudy Galvez RN  Outcome: Ongoing     Problem: Nutrition  Goal: Optimal nutrition therapy  4/7/2022 1656 by Chinmay Clayton LPN  Outcome: Ongoing  4/7/2022 0307 by Rudy Galvez RN  Outcome: Ongoing

## 2022-04-07 NOTE — PROGRESS NOTES
disease)     no stent needed per pt.  Dr. Shagufta Wolff did cath at [facility]    Cardiac murmur     Cellulitis     left leg    Cellulitis 2017 August    leg left leg/bug bite    Cerebral artery occlusion with cerebral infarction [facility])     TIA 2014    COVID-19     ONE YR AGO IN 4/25/2020 fever and cough    Diverticulosis of colon (without mention of hemorrhage)     GERD (gastroesophageal reflux disease)     GERD (gastroesophageal reflux disease)     on rx    History of blood transfusion     approx 2020        History of CHF (congestive heart failure)     History of MI (myocardial infarction) 2005    thought due to a blood clot    History of ovarian cyst 1970    had oopherectomy holly    History of peritonitis 1968    due to ruptured appendix age 13    HTN (hypertension)     Hx of blood clots     right leg    Hyperlipidemia     Intestinal or peritoneal adhesions with obstruction (postoperative) (postinfection) ([address]     Kidney infection     renal failure/sepsis/spider bite    Lateral epicondylitis  of elbow     MDRO (multiple drug resistant organisms) resistance     c diff    Muscle strain     right posterior shoulder    Other abnormal glucose     PONV (postoperative nausea and vomiting)     dry heaves    Pre-diabetes     Restless legs syndrome (RLS)     Snores     no cpap    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)     TIA (transient ischemic attack) 2014    Uses walker     Vitamin D deficiency     Wears glasses     Wellness examination     last seen 2 weeks ago        Past Surgical History:     Past Surgical History:   Procedure Laterality Date    ABDOMEN SURGERY  1976    benign tumor removed near remaining ovary, 1.5 pounds    APPENDECTOMY  1968    appendix ruptured, developed peritonitis    BACK SURGERY      BUNIONECTOMY Left     along with calcium deposits removed   [address]  2005    negative    CERVICAL FUSION  05/21/2021 POSTERIOR C3-6 LAMINECTOMY, PARTIAL C7 LAMINECTOMY, FUSION C3-C6, SILVERCORD    CERVICAL FUSION N/A 5/21/2021    POSTERIOR C3-6 LAMINECTOMY, PARTIAL C7 LAMINECTOMY, FUSION C3-C6, SILVERCORD performed by Chris To DO at [address]    12 INCHES REMOVED D/T OBSTRUCTION    COLONOSCOPY      CYST REMOVAL Right     right facial    HYSTERECTOMY  1973    taken as a result of recurring cysts    LUMBAR FUSION N/A 02/10/2020    LUMBAR L4-5 POSTERIOR  DECOMPRESSION INSTRUMENTATION FUSION WCEMENT AUGMENTATION/ performed by Gwendlyn Gowers, MD at [place] [address] N/A 06/17/2020    L5-S1 PLIF L4-L5 REVISION performed by Gwendlyn Gowers, MD at [address]  08/14/2014    FESS    OVARY REMOVAL  1970    UNILATERAL due to cyst    OVARY REMOVAL  1971    partial, due to cyst    SINUS SURGERY  2004    UPPER GASTROINTESTINAL ENDOSCOPY N/A 05/31/2019    EGD ESOPHAGOGASTRODUODENOSCOPY performed by Scott Durán MD at [address] [place] [address] N/A 08/05/2019    EGD BIOPSY performed by Ajay Fernandez MD at [address] N/A 08/23/2019    EGD BIOPSY performed by Scott Durán MD at [address] [facility] 03/05/2019    WRIST OPEN REDUCTION INTERNAL FIXATION performed by Tricia Tan MD at [address]        Medications Prior to Admission:     Prior to Admission medications    Medication Sig Start Date End Date Taking?  Authorizing Provider   amLODIPine (NORVASC) 10 MG tablet Take 1 tablet by mouth daily 3/10/22   Marie Bernard MD   furosemide (LASIX) 40 MG tablet Take 1 tablet by mouth 2 times daily 3/1/22   MAIK Cifuentes CNP   carvedilol (COREG) 12.5 MG tablet Take 1 tablet by mouth 2 times daily 2/23/22   MAIK Cifuentes CNP   apixaban (ELIQUIS) 5 MG TABS tablet Please take 2 tablets by mouth for the first week then take 1 tablet by mouth BID for acute DVT  Patient taking differently: Take 5 mg by mouth 2 times daily take 1 tablet by mouth BID for acute DVT 2/17/22   MAIK Abdalla CNP   atorvastatin (LIPITOR) 80 MG tablet Take 0.5 tablets by mouth nightly 2/1/22   MAIK Abdalla CNP   lisinopril (PRINIVIL;ZESTRIL) 30 MG tablet Take 1 tablet by mouth daily 1/21/22   Dell Alexandra MD   fenofibrate micronized (LOFIBRA) 134 MG capsule Take 1 capsule by mouth every morning (before breakfast) 1/13/22   Dell Alexandra MD   pramipexole (MIRAPEX) 0.5 MG tablet Take 1 tablet by mouth nightly 1/6/22   Dell Alexandra MD   citalopram (CELEXA) 20 MG tablet Take 1 tablet by mouth daily 1/3/22   Braydon Brown MD   nitroGLYCERIN (NITROSTAT) 0.4 MG SL tablet Place 1 tablet under the tongue every 5 minutes as needed for Chest pain 9/1/21   Dell Alexandra MD   docusate sodium (COLACE) 100 MG capsule Take 1 capsule by mouth 2 times daily as needed for Constipation 5/30/21   Alexey Marely,    diphenhydrAMINE HCl (BENADRYL ALLERGY PO) Take 1 capsule by mouth daily Takes q HS    Historical Provider, MD   cyanocobalamin ([facility] VITAMIN B12) 1000 MCG tablet Take 1 tablet by mouth daily 12/3/20   Dell Alexandra MD   ferrous sulfate (IRON 325) 325 (65 Fe) MG tablet Take 1 tablet by mouth 2 times daily 7/2/20   Kofi Evans MD   VITAMIN D, ERGOCALCIFEROL, PO Take by mouth    Historical Provider, MD   Lancets MISC 1 each by Does not apply route daily 10/10/19   Li Rascon MD   blood glucose monitor strips Test 2 times a day & as needed for symptoms of irregular blood glucose. 10/10/19   Li Rascon MD   Calcium Carbonate-Vitamin D (CALCIUM 500/D) 500-125 MG-UNIT TABS Take 1 tablet by mouth daily     Historical Provider, MD        Allergies:     Bactrim [sulfamethoxazole-trimethoprim], Codeine, and Seasonal    Social History:     Tobacco:    reports that she quit smoking about 4 years ago. Her smoking use included cigarettes.  She started smoking about 26 years ago. She has a 10.00 pack-year smoking history. She has never used smokeless tobacco.  Alcohol:      reports no history of alcohol use. Drug Use:  reports no history of drug use. Family History:     Family History   Problem Relation Age of Onset    Stroke Mother     Diabetes Mother     Heart Disease Mother     High Blood Pressure Mother     Heart Disease Father     Heart Disease Brother     High Blood Pressure Brother     Heart Disease Maternal Grandmother     High Blood Pressure Sister        Review of Systems:     Positive and Negative as described in HPI. CONSTITUTIONAL:  negative for fevers, chills, sweats, fatigue, weight loss  HEENT:  negative for vision, hearing changes, runny nose, throat pain  RESPIRATORY: Positive for cough, shortness of breath, wheezing  CARDIOVASCULAR: Positive for chest, worse with cough. GASTROINTESTINAL:  negative for nausea, vomiting, diarrhea, constipation, change in bowel habits, abdominal pain   GENITOURINARY:  negative for difficulty of urination, burning with urination, frequency   INTEGUMENT:  negative for rash, skin lesions, easy bruising   HEMATOLOGIC/LYMPHATIC:  negative for swelling/edema   ALLERGIC/IMMUNOLOGIC:  negative for urticaria , itching  ENDOCRINE:  negative increase in drinking, increase in urination, hot or cold intolerance  MUSCULOSKELETAL:  negative joint pains, muscle aches, swelling of joints  NEUROLOGICAL:  negative for headaches, dizziness, lightheadedness, numbness, pain, tingling extremities      Physical Exam:     BP (!) 87/36   Pulse 63   Temp 98.4 °F (36.9 °C) (Oral)   Resp 18   Ht 5' 3\" (1.6 m)   Wt 180 lb 6.4 oz (81.8 kg)   SpO2 92%   BMI 31.96 kg/m²   Temp (24hrs), Av.1 °F (36.7 °C), Min:97.7 °F (36.5 °C), Max:98.4 °F (36.9 °C)    No results for input(s): POCGLU in the last 72 hours.     Intake/Output Summary (Last 24 hours) at 2022  Last data filed at 2022 1934  Gross per 24 hour   Intake 480 ml Output --   Net 480 ml       General Appearance:  alert, well appearing, and in no acute distress  Head:  normocephalic, atraumatic. Eye: no icterus, redness, pupils equal and reactive, extraocular eye movements intact, conjunctiva clear  Ear: normal external ear, no discharge, hearing intact  Nose:  no drainage noted  Mouth: mucous membranes moist  Neck: supple, no carotid bruits, thyroid not palpable  Lungs: Air entry but decreased, better wheezing present  Cardiovascular: normal rate, regular rhythm, no murmur, gallop, rub. Abdomen: Soft, nontender, nondistended, normal bowel sounds, no hepatomegaly or splenomegaly  Neurologic: There are no new focal motor or sensory deficits, normal muscle tone and bulk, no abnormal sensation, normal speech, cranial nerves II through XII grossly intact  Skin: No gross lesions, rashes, bruising or bleeding on exposed skin area  Extremities:  peripheral pulses palpable, no pedal edema or calf pain with palpation  Psych: normal affect    Investigations:      Laboratory Testing:  No results found for this or any previous visit (from the past 24 hour(s)).     Imaging/Diagonstics:  Recent data reviewed    Assessment :      Primary Problem  Cervical myelopathy [facility])    Active Hospital Problems    Diagnosis Date Noted    Cervical myelopathy ([facility] [address] [G95.9] 03/17/2022       Plan:     Multiple falls, gait instability, multiple remote and healing rib fractures leading to chest pain-needs PT OT  Neural foraminal narrowing of thoracic spine-neurology as reviewed-recommend rehab and physical therapy  Hypertension-continue amlodipine, Coreg  Right leg DVT-continue Eliquis  Chronic diastolic CHF-currently compensated-continue Coreg, Lipitor, Lasix, lisinopril  Depression -patient noted to be very tearful today-is already on maximal dose of  Celexa, will add BuSpar    DVT prophylaxis-patient on Eliquis    3/27   But has readings of low blood pressure, asymptomatic  Reducing dose of Norvasc to 5 from 10    3/28   Patient has chronic cough, started since she is put on ACE inhibitor  May have ACE inhibitor induced cough, will discontinue lisinopril, start patient on Cozaar  Started patient given nebulization  Has history of smoking  Chest x-ray seen   3/29   Suspecting symptoms are due to acute bronchitis,  Ordering Z-Han, prednisone  EKG seen, troponins are flat  Chest x-ray seen  We will monitor  4/3  Patient shortness of breath is getting better being on steroids  Oxygen requirement going down   We will try to wean oxygen, discussed with RN  Completed antibiotics and steroid    4/4  SOB, little better   On NC o2, 2L  Wean off o2  PT/OT   Labs and vitals reviewed       4/5  Multiple falls, gait instability,  Working with physical therapy,  Improving,  Chronic respiratory failure, on 2 L of oxygen, wean off oxygen,  Morbid obesity, low-calorie diet,  PT OT,  Labs and vitals reviewed,  Medications and their side effects reviewed    4/6  Multiple falls, gait instability,  Chronic respiratory failure with 2 to 3 L of oxygen, wean off oxygen,  Morbid obesity, low-calorie diet,  Labs, radiology, vitals, medications reviewed,  Continue physical therapy,      Consultations:     Sj Dugan MD  4/6/2022  9:46 PM    Copy sent to Dr. Dejuan Snyder MD    Please note that this chart was generated using voice recognition Dragon dictation software. Although every effort was made to ensure the accuracy of this automated transcription, some errors in transcription may have occurred.

## 2022-04-07 NOTE — PROGRESS NOTES
Physical Medicine & Rehabilitation  Progress Note      Subjective:      Gin Montero is a 71 y.o. female with cervical myelopathy. She reports doing fine today. She states that she is ready to go home tomorrow. She denies any other acute concerns. ROS:  Denies fevers, chills, sweats. +chest pain; No palpitations, lightheadedness. +coughing; No wheezing or shortness of breath. Denies abdominal pain, nausea, diarrhea or constipation. No new areas of joint pain. Denies new areas of numbness or weakness. Denies new anxiety or depression issues. No new skin problems. Rehabilitation:   Progressing in therapies. PT:  Restrictions/Precautions: General Precautions,Fall Risk  Implants present? : Metal implants (cervical and lumbar surgeries, L wrist ORIF)  Other position/activity restrictions: up as tolerated; O2 3L/m via NC   Transfers  Sit to Stand: Contact guard assistance (w/ RW + VC's for technique)  Stand to sit: Contact guard assistance (w/ RW + VC's for technique)  Bed to Chair: Contact guard assistance,Minimal assistance (w/ RW + VC's for technique)  Stand Pivot Transfers: Contact guard assistance,Minimal Assistance (w/ RW + VC's for technique)  Comment: Hx of pushing backwards and relying on back of legs for support against sitting surface.  Does much better when cued to scoot forward to edge of chair/bed, position her feet, and to bring her \"nose over her toes\"  Ambulation 1  Surface: level tile  Device: Rolling Walker  Other Apparatus: Wheelchair follow  Assistance: Contact guard assistance  Quality of Gait: slow tawanda with step to pattern, increase tone in LLE causing decrease knee flexion and flat foot LE at contact with amb, decreased stance on L LE; downward gaze and cervical flexion; slight drift to L side (cues to correct all, with fair but fleeting response from pt)  Gait Deviations: Slow Tawanda,Decreased step length,Decreased step height  Distance: 90ft  Comments: 2MWT: 59ft    Transfers  Sit to Stand: Contact guard assistance (w/ RW + VC's for technique)  Stand to sit: Contact guard assistance (w/ RW + VC's for technique)  Bed to Chair: Contact guard assistance,Minimal assistance (w/ RW + VC's for technique)  Stand Pivot Transfers: Contact guard assistance,Minimal Assistance (w/ RW + VC's for technique)  Comment: Hx of pushing backwards and relying on back of legs for support against sitting surface.  Does much better when cued to scoot forward to edge of chair/bed, position her feet, and to bring her \"nose over her toes\"  Ambulation  Ambulation?: Yes  More Ambulation?: Yes  Ambulation 1  Surface: level tile  Device: Rolling Walker  Other Apparatus: Wheelchair follow  Assistance: Contact guard assistance  Quality of Gait: slow tawanda with step to pattern, increase tone in LLE causing decrease knee flexion and flat foot LE at contact with amb, decreased stance on L LE; downward gaze and cervical flexion; slight drift to L side (cues to correct all, with fair but fleeting response from pt)  Gait Deviations: Slow Tawanda,Decreased step length,Decreased step height  Distance: 90ft  Comments: 2MWT: 59ft    Surface: level tile  Ambulation 1  Surface: level tile  Device: Rolling Walker  Other Apparatus: Wheelchair follow  Assistance: Contact guard assistance  Quality of Gait: slow tawanda with step to pattern, increase tone in LLE causing decrease knee flexion and flat foot LE at contact with amb, decreased stance on L LE; downward gaze and cervical flexion; slight drift to L side (cues to correct all, with fair but fleeting response from pt)  Gait Deviations: Slow Tawanda,Decreased step length,Decreased step height  Distance: 90ft  Comments: 2MWT: 59ft    OT:  ADL  Equipment Provided: Reacher,Long-handled sponge  Feeding: Modified independent  (Per pt report)  Grooming: Stand by assistance (Standing sink side)  UE Bathing: Stand by assistance (sitting in tub bench)  LE Bathing: Stand by assistance  UE Dressing: Stand by assistance (sitting on toilet)  LE Dressing: Stand by assistance  Toileting: Stand by assistance  Additional Comments: OT facilitated pts engagement in full shower on this date with use of tub bench, grab bars, hand held shower head, and LHS. Dycem placed in shower to assist with transfer safety due to L foot slipping. Pt completed dressing tasks while sitting on toilet per home set-up. Increased time with use of reacher to complete tasks. Pt completed grooming tasks while standing at sink side. Instrumental ADL's  Instrumental ADLs: Yes     Balance  Sitting Balance: Modified independent   Standing Balance: Stand by assistance   Standing Balance  Time: AM: 1-2 mintues x 2; 5+ minutes x 2 PM: 2 minutes x 1  Activity: Functional transfers/mobility, self care tasks  Comment: with RW  Functional Mobility  Functional - Mobility Device: Rolling Walker  Activity: Retrieve items,Transport items,To/from bathroom  Assist Level: Stand by assistance  Functional Mobility Comments: with minimal verba cues for safety     Bed mobility  Rolling to Left: Stand by assistance  Rolling to Right: Stand by assistance  Supine to Sit: Stand by assistance  Sit to Supine: Stand by assistance  Scooting: Contact guard assistance  Comment: bed mobility on mat table  Transfers  Stand Pivot Transfers: Contact guard assistance  Sit to stand: Stand by assistance  Stand to sit: Stand by assistance  Transfer Comments: verbal cues for hand placement and safety   Toilet Transfers  Toilet - Technique: Ambulating  Equipment Used: Grab bars  Toilet Transfer: Stand by assistance  Toilet Transfers Comments: with RW     Shower Transfers  Shower - Transfer From: Walker  Shower - Transfer Type: To and From  Shower - Transfer To:  Transfer tub bench  Shower - Technique: Ambulating  Shower Transfers: Stand by assistance  Shower Transfers Comments: Verbal cues for hand placement and safety over ramp  Wheelchair Bed Transfers  Wheelchair/Bed - Technique: Stand pivot  Equipment Used: Other (RW)  Level of Asssistance:  Moderate assistance  Wheelchair Transfers Comments: with RW; Blocking of L foot due to slipping    SPEECH:      Current Medications:   Current Facility-Administered Medications: sodium chloride (OCEAN, BABY AYR) 0.65 % nasal spray 1 spray, 1 spray, Each Nostril, PRN  albuterol (PROVENTIL) nebulizer solution 2.5 mg, 2.5 mg, Nebulization, Q6H PRN  ipratropium-albuterol (DUONEB) nebulizer solution 1 ampule, 1 ampule, Inhalation, 4x daily  [Held by provider] losartan (COZAAR) tablet 100 mg, 100 mg, Oral, Daily  amLODIPine (NORVASC) tablet 5 mg, 5 mg, Oral, Daily  guaiFENesin (ROBITUSSIN) 100 MG/5ML liquid 100 mg, 100 mg, Oral, BID PRN  traMADol (ULTRAM) tablet 50 mg, 50 mg, Oral, Q6H PRN  gabapentin (NEURONTIN) capsule 200 mg, 200 mg, Oral, BID  melatonin tablet 6 mg, 6 mg, Oral, Nightly PRN  lidocaine 4 % external patch 1 patch, 1 patch, TransDERmal, Daily  busPIRone (BUSPAR) tablet 5 mg, 5 mg, Oral, TID  magnesium hydroxide (MILK OF MAGNESIA) 400 MG/5ML suspension 30 mL, 30 mL, Oral, Daily PRN  apixaban (ELIQUIS) tablet 5 mg, 5 mg, Oral, BID  atorvastatin (LIPITOR) tablet 40 mg, 40 mg, Oral, Nightly  calcium carbonate w/vitamin D (CALTRATE) 600-400 MG-UNIT per tab 1 tablet, 1 tablet, Oral, Daily  carvedilol (COREG) tablet 12.5 mg, 12.5 mg, Oral, BID  citalopram (CELEXA) tablet 20 mg, 20 mg, Oral, Daily  docusate sodium (COLACE) capsule 100 mg, 100 mg, Oral, BID PRN  fenofibrate (TRIGLIDE) tablet 160 mg, 160 mg, Oral, Daily  ferrous sulfate (IRON 325) tablet 325 mg, 325 mg, Oral, BID  [Held by provider] furosemide (LASIX) tablet 20 mg, 20 mg, Oral, BID  pramipexole (MIRAPEX) tablet 0.5 mg, 0.5 mg, Oral, Nightly  vitamin B-12 (CYANOCOBALAMIN) tablet 1,000 mcg, 1,000 mcg, Oral, Daily  acetaminophen (TYLENOL) tablet 650 mg, 650 mg, Oral, Q4H PRN  polyethylene glycol (GLYCOLAX) packet 17 g, 17 g, Oral, Daily  senna (SENOKOT) tablet 17.2 mg, 2 tablet, Oral, Daily PRN  bisacodyl (DULCOLAX) suppository 10 mg, 10 mg, Rectal, Daily PRN      Objective:  BP (!) 102/38   Pulse 70   Temp 98.2 °F (36.8 °C) (Oral)   Resp 20   Ht 5' 3\" (1.6 m)   Wt 180 lb 6.4 oz (81.8 kg)   SpO2 91%   BMI 31.96 kg/m²       GEN: Well developed, well nourished, no acute distress  HEENT: NCAT. EOM grossly intact. Hearing grossly intact. RESP: Normal breath sounds with no wheezing, rales, or rhonchi. Respirations WNL and unlabored. CV: Regular rate and rhythm. No murmurs, rubs, or gallops. ABD: Soft, non-distended, BS+ and equal.  NEURO: Alert. Speech fluent. MSK:  Muscle tone and bulk are normal bilaterally. Moving bilateral upper limbs with at least antigravity strength. LIMBS: No edema in bilateral lower limbs. SKIN: Warm and dry with good turgor. PSYCH: Mood WNL. Affect WNL. Appropriately interactive. Diagnostics:     CBC:   Recent Labs     04/07/22  0652   WBC 7.6   RBC 3.80*   HGB 11.7*   HCT 35.3*   MCV 92.8   RDW 13.7        BMP:   Recent Labs     04/07/22  0652      K 4.8      CO2 25   BUN 61*   CREATININE 1.68*   GLUCOSE 129*     BNP: No results for input(s): BNP in the last 72 hours. PT/INR: No results for input(s): PROTIME, INR in the last 72 hours. APTT: No results for input(s): APTT in the last 72 hours. CARDIAC ENZYMES: No results for input(s): CKMB, CKMBINDEX, TROPONINT in the last 72 hours. Invalid input(s): CKTOTAL;3  FASTING LIPID PANEL:  Lab Results   Component Value Date    CHOL 103 05/20/2019    HDL 74 03/12/2021    TRIG 66 05/20/2019     LIVER PROFILE: No results for input(s): AST, ALT, ALB, BILIDIR, BILITOT, ALKPHOS in the last 72 hours. Impression/Plan:   Impaired ADLs, gait, and mobility due to:    1. Cervical myelopathy:  S/p previous cervical decompression. With ataxia. PT/OT  for gait, mobility, strengthening, endurance, ADLs, and self care.   Pain control with gabapentin, as-needed tylenol, as-needed tramadol. 2. Elevated creatinine:  IM holding lasix, losartan - will recheck in the morning. 3. Suspected costochondritis:  Pain control with as-needed tramadol, as-needed tylenol. 4. Bronchitis:  Completed course of steroids and zithromax. On scheduled duo-neb. Has robitussin as needed. 5. Anemia:  Hemoglobin 11.7 on 4/7, stable to improved. Monitoring. On oral iron supplementation. 6. Lower limb cellulitis:  Resolved. Completed course of keflex. 7. CAD:  On atorvastatin, fenofibrate  8. Chronic diastolic heart failure: On lasix (currently on hold)  9. HTN:  On amlodipine, carvedilol, losartan (currently on hold)  10. Chronic DVT:  On eliquis  11. Depression: On celexa. IM added buspirone. Patient previously declined psych evaluation. 12. Restless leg syndrome: On pramipexole  13. Nasal congestion, dryness: Added saline nasal spray as needed on 4/4. 14. Bowel Management: Miralax daily, senokot prn, dulcolax prn, docusate BID prn, milk of magnesia prn  15. DVT prophylaxis:  On eliquis  16.  Internal Medicine for medical management      Electronically signed by Boaz Morales MD on 4/8/2022 at 12:47 AM

## 2022-04-07 NOTE — PROGRESS NOTES
Physical Therapy  Facility/Department: [facility]  Daily Treatment Note  NAME: Navya Alcala  : 1950  MRN: [id number]    Date of Service: 2022    Discharge Recommendations:  Patient would benefit from continued therapy after discharge   PT Equipment Recommendations  Other: Pt has hospital bed, lift chair, rolling walker, and a Rollator at home. Assessment   Body structures, Functions, Activity limitations: Decreased functional mobility ; Decreased ADL status; Decreased ROM; Decreased strength;Decreased endurance;Decreased balance;Decreased posture; Increased pain  Treatment Diagnosis: Impaired functional mobility 2* ataxia  Specific instructions for Next Treatment: transfers, amb, balance, standing program  Prognosis: Good  Barriers to Learning: none  REQUIRES PT FOLLOW UP: Yes  Activity Tolerance  Activity Tolerance: Patient limited by pain; Patient limited by endurance; Patient limited by fatigue     Patient Diagnosis(es): There were no encounter diagnoses.      has a past medical history of Allergic rhinitis, cause unspecified, Back pain, Bowel obstruction (HCC), C. difficile diarrhea, CAD (coronary artery disease), Cardiac murmur, Cellulitis, Cellulitis, Cerebral artery occlusion with cerebral infarction ([facility] [address] COVID-19, Diverticulosis of colon (without mention of hemorrhage), GERD (gastroesophageal reflux disease), GERD (gastroesophageal reflux disease), History of blood transfusion, History of CHF (congestive heart failure), History of MI (myocardial infarction), History of ovarian cyst, History of peritonitis, HTN (hypertension), Hx of blood clots, Hyperlipidemia, Intestinal or peritoneal adhesions with obstruction (postoperative) (postinfection) ([facility] [address] Kidney infection, Lateral epicondylitis  of elbow, MDRO (multiple drug resistant organisms) resistance, Muscle strain, Other abnormal glucose, PONV (postoperative nausea and vomiting), Pre-diabetes, Restless legs syndrome (RLS), Snores, 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[facility] [address] TIA (transient ischemic attack), Uses walker, Vitamin D deficiency, Wears glasses, and Wellness examination. has a past surgical history that includes Ovary removal (1970); colectomy (2245); Appendectomy (1968); Ovary removal (1971); Hysterectomy (1973); Bunionectomy (Left); sinus surgery (2004); Colonoscopy; other surgical history (08/14/2014); cyst removal (Right); Wrist fracture surgery (Left, 03/05/2019); Upper gastrointestinal endoscopy (N/A, 05/31/2019); Upper gastrointestinal endoscopy (N/A, 08/05/2019); Upper gastrointestinal endoscopy (N/A, 08/23/2019); Abdomen surgery (1976); lumbar fusion (N/A, 02/10/2020); Cardiac catheterization; Cardiac catheterization (2005); [place] tooth extraction; lumbar fusion (N/A, 06/17/2020); back surgery; cervical fusion (05/21/2021); and cervical fusion (N/A, 5/21/2021). Restrictions  Restrictions/Precautions  Restrictions/Precautions: General Precautions,Fall Risk  Required Braces or Orthoses?: No  Implants present? : Metal implants (cervical and lumbar surgeries, L wrist ORIF)  Position Activity Restriction  Other position/activity restrictions: up as tolerated    Subjective   General  Chart Reviewed: Yes  Additional Pertinent Hx: TIA  Response To Previous Treatment: Patient with no complaints from previous session. Family / Caregiver Present: No  Referring Practitioner: Dr Susan Peguero. Subjective  Subjective: Patient reported fatigue today. Pain Screening  Patient Currently in Pain: Yes  Pain Assessment  Pain Assessment: Faces  Stanley-Baker Pain Rating: Hurts little more  Pain Type: Chronic pain  Pain Location: Neck  Pain Orientation: Left; Lower; Posterior  Pain Descriptors: Sore  Pain Frequency: Continuous  Clinical Progression: Gradually improving  Non-Pharmaceutical Pain Intervention(s): Repositioned;Rest;Ambulation/Increased Activity; Distraction; Emotional support  Response to Pain Intervention: Patient Satisfied  Vital Signs  Patient Currently in Pain: Yes  Oxygen Therapy  O2 Device: None (Room air)       Orientation  Orientation  Overall Orientation Status: Within Normal Limits    Objective   Bed mobility  Rolling to Left: Stand by assistance  Rolling to Right: Stand by assistance  Supine to Sit: Stand by assistance  Sit to Supine: Stand by assistance  Scooting: Contact guard assistance  Transfers  Sit to Stand: Contact guard assistance (w/ RW + VC's for technique)  Stand to sit: Contact guard assistance (w/ RW + VC's for technique)  Bed to Chair: Contact guard assistance;Minimal assistance (w/ RW + VC's for technique)  Stand Pivot Transfers: Contact guard assistance;Minimal Assistance (w/ RW + VC's for technique)  Comment: Hx of pushing backwards and relying on back of legs for support against sitting surface. Does much better when cued to scoot forward to edge of chair/bed, position her feet, and to bring her \"nose over her toes\"  Ambulation  Ambulation?: Yes  Ambulation 1  Surface: level tile  Device: Rolling Walker  Assistance: Contact guard assistance  Quality of Gait: slow tawanda with step to pattern, increase tone in LLE causing decrease knee flexion and flat foot LE at contact with amb, decreased stance on L LE; downward gaze and cervical flexion; slight drift to L side (cues to correct all, with fair but fleeting response from pt)  Gait Deviations: Slow Tawanda;Decreased step length;Decreased step height  Distance: 90ft  Comments: 2MWT: 59ft  Stairs/Curb  Stairs?: Yes  Stairs  # Steps : 10  Stairs Height:  (4\" & 6\")  Rails: Bilateral  Device: No Device  Assistance: Contact guard assistance  Comment: Pt able to verbalized and demonstrate correct step to sequencing. CGA. Slight circumduction of LLE with ascending steps.  VC's to correct  Wheelchair Activities  Propulsion: Yes  Propulsion 1  Propulsion: Manual  Level: Level Tile  Method: RUE;LUE  Level of Assistance: Modified independent  Distance: 64ft (had to stop d/u fatigue)     Balance  Posture: Fair  Sitting - Static: Good  Sitting - Dynamic: Fair  Standing - Static: Fair;+  Standing - Dynamic: Fair  Comments: Standing with RW. Other exercises  Other exercises?: Yes  Other exercises 1: Seated Ex: BLE x15  Other exercises 3: Standing Ex: RLE x10  Other exercises 9: Review of posture  Other exercises 10: STS transfers: x10       OutComes Score  Balance Score: 12 (04/07/22 1008)  Gait Score: 5 (04/07/22 1008)        Tinetti Total Score: 17 (04/07/22 1008)     2MWT: 59ft (2MWT on 4/5/22: 100ft)     Goals  Short term goals  Time Frame for Short term goals: 1 week  Short term goal 1: Pt to demostrate bed mobility CGA/eJnnifer. Short term goal 2: Pt to perform transfers CGA. Short term goal 3: Pt to amb 100'-150' with device, CGA. Short term goal 4: Pt to improve BLE strength by 1/2 MMG. Short term goal 5: Pt to improve standing balance to [facility]. Short term goal 6: Pt able to perform 4\" and 6\" steps x3 in //bars or acute stair way, with 2 B UE support, min A  Long term goals  Time Frame for Long term goals : By DC  Long term goal 1: Pt able to perform bed mobility at A  Long term goal 2: Pt able to transfer at supervsion level. Long term goal 3:  Pt able to ambulate house hold distances with assistive device mod-I  Long term goal 4:  Pt able to ambulate distance of 150 ft, level surfaces and shorter distance outside terraine at [facility]. Long term goal 5: Pt able to propel w/c on level surface, distance of 150 ft, supervsion level. Long term goal 6: Improve 2MWT distance to atleast 120 ft to improve overall fucntion. Long term goal 7: Pt to improve Tinetti balance score to atlest 20/28 to reduce fall risk. Long term goal 8: Pt able to perform curb step with B UE support and/or donny to goup and down 4 to 5 steps with 2 rails at min A   Patient Goals   Patient goals : To get stronger    Plan    Plan  Times per week: 1.5 hr/day, 5 to 7 days/week.   Specific instructions for Next Treatment: transfers, amb, balance, standing program  Current Treatment Recommendations: Strengthening,Balance Training,Functional Mobility Training,Transfer Training,Endurance Training,Gait Training,Equipment Evaluation, Education, & procurement,Patient/Caregiver Education & Training,Safety Education & Training,Stair training,Wheelchair Mobility Training  Safety Devices  Type of devices:  All fall risk precautions in place,Gait belt,Left in chair,Call light within reach,Patient at risk for falls     Therapy Time         04/07/22 1005 04/07/22 1330   PT Individual Minutes   Time In 1005 1330   Time Out 1105 1400   Minutes 60 [address] [facility]

## 2022-04-07 NOTE — PROGRESS NOTES
[facility] [address]    ACUTE REHABILITATION OCCUPATIONAL THERAPY  DAILY NOTE    Date: 22  Patient Name: Cyn Hutchison      Room: 3644/7896-81    MRN: [id number]   : 1950  (71 y.o.)  Gender: female   Referring Practitioner: Umu Madrid MD  Diagnosis: Cervical myelopathy  Additional Pertinent Hx: 71 y.o.  female, with a history of anemia, spondylolisthesis, chronic DVT, chronic diastolic heart failure, CVA, major depressive disorder, s/p cervical spine fusion, stenosis of cervical spine with myelopathy, and DM type II, who presents with leg pain, shortness of breath, fall, and neck pain. According to patient and her , patient has had multiple falls recently including a fall Monday and evening and again on Tuesday. Patient reports that today she felt extremely weak upon waking. Restrictions  Restrictions/Precautions: General Precautions,Fall Risk  Implants present? : Metal implants (cervical and lumbar surgeries, L wrist ORIF)  Other position/activity restrictions: up as tolerated; O2 3L/m via NC  Required Braces or Orthoses?: No  Equipment Used: Other (RW)    Subjective  Subjective: \"I am ready to go home. \"   Comments: Pt pleasent and agreeable for OT treatment. Patient Currently in Pain: No (\"Only when I cough. \")  Restrictions/Precautions: General Precautions; Fall Risk  Overall Orientation Status: Within Functional Limits          Objective  Cognition  Overall Cognitive Status: WFL  Perception  Overall Perceptual Status: WFL  Balance  Sitting Balance: Modified independent   Standing Balance: Stand by assistance  Transfers  Sit to stand: Stand by assistance  Stand to sit: Stand by assistance  Transfer Comments: verbal cues for hand placement and safety  Standing Balance  Time: AM: 1-2 mintues x 2; 5+ minutes x 2 PM: 2 minutes x 1  Activity: Functional transfers/mobility, self care tasks  Comment: with RW  Functional Mobility  Functional - Mobility Device: Rolling Walker  Activity: Retrieve items;Transport items; To/from bathroom  Assist Level: Stand by assistance  Functional Mobility Comments: with minimal verba cues for safety  Toilet Transfers  Toilet - Technique: Ambulating  Equipment Used: Grab bars  Toilet Transfer: Stand by assistance  Toilet Transfers Comments: with RW  Shower Transfers  Shower - Transfer From: Miguelito Dougherty - Transfer Type: To and From  Shower - Transfer To: Transfer tub bench  Shower - Technique: Ambulating  Shower Transfers: Stand by assistance  Shower Transfers Comments: Verbal cues for hand placement and safety over ramp           Additional Activities: PM: Pt engaged in resistive clothes pin task on this date to increased strenth in bilateral hands. Left Hand Strength -  (lbs)  Handle Setting 2: 30# (26, 34, 30) norm 32-54        Right Hand Strength -  (lbs)  Handle Setting 2: 32# (28, 33, 35) norm 32-54#        Fine Motor Skills  Left 9 Hole Peg Test Time (secs): 48 (norm 21-30s)  Right 9 Hole Peg Test Time (secs): 30 (norm 21-30s)           ADL  Equipment Provided: Reacher;Long-handled sponge  Feeding: Modified independent  (Per pt report)  Grooming: Stand by assistance (Standing sink side)  UE Bathing: Stand by assistance (sitting in tub bench)  LE Bathing: Stand by assistance  UE Dressing: Stand by assistance (sitting on toilet)  LE Dressing: Stand by assistance  Toileting: Stand by assistance  Additional Comments: OT facilitated pts engagement in full shower on this date with use of tub bench, grab bars, hand held shower head, and LHS. Dycem placed in shower to assist with transfer safety due to L foot slipping. Pt completed dressing tasks while sitting on toilet per home set-up. Increased time with use of reacher to complete tasks. Pt completed grooming tasks while standing at sink side. Assessment  Performance deficits / Impairments: Decreased functional mobility ; Decreased ADL status; Decreased strength;Decreased endurance;Decreased sensation;Decreased balance;Decreased high-level IADLs;Decreased fine motor control;Decreased coordination  Prognosis: Good  Discharge Recommendations: 24 hour supervision or assist;Home with Home health OT  Activity Tolerance: Patient Tolerated treatment well  Safety Devices in place: Yes  Type of devices: Left in chair;Call light within reach; Patient at risk for falls  Restraints  Initially in place: No          Patient Education: safety with transfers  Patient Goals   Patient goals : \"To be able to put my pants/socks on better\"  Learner:patient  Method: explanation       Outcome: demonstrated understanding        Plan  Plan  Times per week: 5-7  Times per day: Twice a day  Current Treatment Recommendations: Balance Training,Functional Mobility Training,Endurance Training,Strengthening,ROM,Safety Education & Training,Patient/Caregiver Education & Training,Equipment Evaluation, Education, & procurement,Self-Care / Tasha Lugo Management  Patient Goals   Patient goals :  \"To be able to put my pants/socks on better\"  Short term goals  Time Frame for Short term goals: 1 week   Short term goal 1: Pt will complete LB dressing/bathing/toileting CGA with Good safety with use of AE/DME/modified techniques for increased IND with self care  Short term goal 2: Pt will participate in 30+ minutes functional activity for increased strength/balance/endurance for increased IND in ADL's  Short term goal 3: Pt will complete transfers/functional mobility CGA with Good safety and RW for increased IND in ADL's  Short term goal 4: Pt will verbalize/demonstrate Good understanding of AE/DME/modified techniques for increased IND in self care  Short term goal 5: Pt will complete UB bathing/dressing/grooming with Supervision for increased IND in self care  Long term goals  Time Frame for Long term goals : by discharge   Long term goal 1: Pt will complete toileting/grooming/dressing Mod I and bathing with Supervison with Good safety with use of AE/DME/modified techniques for increased IND with self care  Long term goal 2: Pt will complete functional mobility/transferes during functional activity Mod I with Good safety and RW for increased IND in ADL's  Long term goal 3: Pt will verbalize/demonstrate Good understanding of fall prevention strategies for increased safety/IND in self care  Long term goal 4: Pt will improve L hand strength for self care as evidence by a 3# improvement in dynomometor testing   Long term goal 5: Pt will improve L FMC as evidence by a 20 second improvement on 9 hole peg test for increased IND with self care     04/07/22 0800 04/07/22 1301   OT Individual Minutes   Time In 0800 1301   Time Out 0900 1333   Minutes 60 32   Time Code Minutes    Timed Code Treatment Minutes 60 Minutes 32 Minutes     Electronically signed by Johnson Juárez OT on 4/7/22 at 3:21 PM EDT

## 2022-04-08 ENCOUNTER — APPOINTMENT (OUTPATIENT)
Dept: NON INVASIVE DIAGNOSTICS | Age: 72
DRG: 949 | End: 2022-04-08
Attending: PHYSICAL MEDICINE & REHABILITATION
Payer: MEDICARE

## 2022-04-08 ENCOUNTER — APPOINTMENT (OUTPATIENT)
Dept: GENERAL RADIOLOGY | Age: 72
DRG: 949 | End: 2022-04-08
Attending: PHYSICAL MEDICINE & REHABILITATION
Payer: MEDICARE

## 2022-04-08 LAB
ANION GAP SERPL CALCULATED.3IONS-SCNC: 12 MMOL/L (ref 9–17)
BUN BLDV-MCNC: 65 MG/DL (ref 8–23)
CALCIUM SERPL-MCNC: 9.1 MG/DL (ref 8.6–10.4)
CHLORIDE BLD-SCNC: 105 MMOL/L (ref 98–107)
CO2: 24 MMOL/L (ref 20–31)
CREAT SERPL-MCNC: 1.56 MG/DL (ref 0.5–0.9)
GFR AFRICAN AMERICAN: 40 ML/MIN
GFR NON-AFRICAN AMERICAN: 33 ML/MIN
GFR SERPL CREATININE-BSD FRML MDRD: ABNORMAL ML/MIN/{1.73_M2}
GLUCOSE BLD-MCNC: 110 MG/DL (ref 70–99)
LV EF: 55 %
LVEF MODALITY: NORMAL
POTASSIUM SERPL-SCNC: 4.2 MMOL/L (ref 3.7–5.3)
SODIUM BLD-SCNC: 141 MMOL/L (ref 135–144)

## 2022-04-08 PROCEDURE — 99233 SBSQ HOSP IP/OBS HIGH 50: CPT | Performed by: INTERNAL MEDICINE

## 2022-04-08 PROCEDURE — 6370000000 HC RX 637 (ALT 250 FOR IP): Performed by: PHYSICAL MEDICINE & REHABILITATION

## 2022-04-08 PROCEDURE — 6370000000 HC RX 637 (ALT 250 FOR IP): Performed by: INTERNAL MEDICINE

## 2022-04-08 PROCEDURE — 1180000000 HC REHAB R&B

## 2022-04-08 PROCEDURE — 6360000002 HC RX W HCPCS: Performed by: INTERNAL MEDICINE

## 2022-04-08 PROCEDURE — 99232 SBSQ HOSP IP/OBS MODERATE 35: CPT | Performed by: STUDENT IN AN ORGANIZED HEALTH CARE EDUCATION/TRAINING PROGRAM

## 2022-04-08 PROCEDURE — 6360000002 HC RX W HCPCS: Performed by: PHYSICAL MEDICINE & REHABILITATION

## 2022-04-08 PROCEDURE — 6360000002 HC RX W HCPCS: Performed by: STUDENT IN AN ORGANIZED HEALTH CARE EDUCATION/TRAINING PROGRAM

## 2022-04-08 PROCEDURE — 6370000000 HC RX 637 (ALT 250 FOR IP): Performed by: STUDENT IN AN ORGANIZED HEALTH CARE EDUCATION/TRAINING PROGRAM

## 2022-04-08 PROCEDURE — 97530 THERAPEUTIC ACTIVITIES: CPT

## 2022-04-08 PROCEDURE — 6370000000 HC RX 637 (ALT 250 FOR IP): Performed by: NURSE PRACTITIONER

## 2022-04-08 PROCEDURE — 2500000003 HC RX 250 WO HCPCS: Performed by: PHYSICAL MEDICINE & REHABILITATION

## 2022-04-08 PROCEDURE — 97110 THERAPEUTIC EXERCISES: CPT

## 2022-04-08 PROCEDURE — 94761 N-INVAS EAR/PLS OXIMETRY MLT: CPT

## 2022-04-08 PROCEDURE — 2700000000 HC OXYGEN THERAPY PER DAY

## 2022-04-08 PROCEDURE — 97116 GAIT TRAINING THERAPY: CPT

## 2022-04-08 PROCEDURE — 80048 BASIC METABOLIC PNL TOTAL CA: CPT

## 2022-04-08 PROCEDURE — 93306 TTE W/DOPPLER COMPLETE: CPT

## 2022-04-08 PROCEDURE — 94640 AIRWAY INHALATION TREATMENT: CPT

## 2022-04-08 PROCEDURE — 36415 COLL VENOUS BLD VENIPUNCTURE: CPT

## 2022-04-08 PROCEDURE — 71045 X-RAY EXAM CHEST 1 VIEW: CPT

## 2022-04-08 RX ORDER — ALBUTEROL SULFATE 2.5 MG/3ML
2.5 SOLUTION RESPIRATORY (INHALATION)
Status: DISCONTINUED | OUTPATIENT
Start: 2022-04-08 | End: 2022-04-12 | Stop reason: HOSPADM

## 2022-04-08 RX ORDER — IPRATROPIUM BROMIDE AND ALBUTEROL SULFATE 2.5; .5 MG/3ML; MG/3ML
1 SOLUTION RESPIRATORY (INHALATION) 4 TIMES DAILY
Status: DISCONTINUED | OUTPATIENT
Start: 2022-04-09 | End: 2022-04-12 | Stop reason: HOSPADM

## 2022-04-08 RX ORDER — BENZONATATE 100 MG/1
100 CAPSULE ORAL 3 TIMES DAILY PRN
Status: DISCONTINUED | OUTPATIENT
Start: 2022-04-08 | End: 2022-04-12 | Stop reason: HOSPADM

## 2022-04-08 RX ORDER — HYDROXYZINE HYDROCHLORIDE 10 MG/1
10 TABLET, FILM COATED ORAL 3 TIMES DAILY PRN
Status: DISCONTINUED | OUTPATIENT
Start: 2022-04-08 | End: 2022-04-12 | Stop reason: HOSPADM

## 2022-04-08 RX ORDER — SODIUM CHLORIDE 9 MG/ML
INJECTION, SOLUTION INTRAVENOUS CONTINUOUS
Status: DISCONTINUED | OUTPATIENT
Start: 2022-04-08 | End: 2022-04-08

## 2022-04-08 RX ORDER — PREDNISONE 20 MG/1
40 TABLET ORAL ONCE
Status: COMPLETED | OUTPATIENT
Start: 2022-04-08 | End: 2022-04-08

## 2022-04-08 RX ORDER — ALBUTEROL SULFATE 2.5 MG/3ML
2.5 SOLUTION RESPIRATORY (INHALATION) 4 TIMES DAILY
Status: DISCONTINUED | OUTPATIENT
Start: 2022-04-08 | End: 2022-04-08

## 2022-04-08 RX ORDER — GUAIFENESIN DEXTROMETHORPHAN HYDROBROMIDE ORAL SOLUTION 10; 100 MG/5ML; MG/5ML
10 SOLUTION ORAL EVERY 4 HOURS PRN
Status: DISCONTINUED | OUTPATIENT
Start: 2022-04-08 | End: 2022-04-12 | Stop reason: HOSPADM

## 2022-04-08 RX ADMIN — TRAMADOL HYDROCHLORIDE 50 MG: 50 TABLET, COATED ORAL at 20:25

## 2022-04-08 RX ADMIN — FERROUS SULFATE TAB 325 MG (65 MG ELEMENTAL FE) 325 MG: 325 (65 FE) TAB at 08:46

## 2022-04-08 RX ADMIN — BUSPIRONE HYDROCHLORIDE 5 MG: 5 TABLET ORAL at 20:26

## 2022-04-08 RX ADMIN — GABAPENTIN 200 MG: 100 CAPSULE ORAL at 08:47

## 2022-04-08 RX ADMIN — ALBUTEROL SULFATE 2.5 MG: 2.5 SOLUTION RESPIRATORY (INHALATION) at 17:12

## 2022-04-08 RX ADMIN — CALCIUM CARBONATE 600 MG (1,500 MG)-VITAMIN D3 400 UNIT TABLET 1 TABLET: at 08:47

## 2022-04-08 RX ADMIN — HYDROXYZINE HYDROCHLORIDE 10 MG: 10 TABLET ORAL at 22:08

## 2022-04-08 RX ADMIN — APIXABAN 5 MG: 5 TABLET, FILM COATED ORAL at 08:46

## 2022-04-08 RX ADMIN — CYANOCOBALAMIN TAB 1000 MCG 1000 MCG: 1000 TAB at 08:47

## 2022-04-08 RX ADMIN — AMLODIPINE BESYLATE 5 MG: 5 TABLET ORAL at 08:47

## 2022-04-08 RX ADMIN — BUSPIRONE HYDROCHLORIDE 5 MG: 5 TABLET ORAL at 14:54

## 2022-04-08 RX ADMIN — CITALOPRAM HYDROBROMIDE 20 MG: 20 TABLET ORAL at 08:47

## 2022-04-08 RX ADMIN — FERROUS SULFATE TAB 325 MG (65 MG ELEMENTAL FE) 325 MG: 325 (65 FE) TAB at 20:25

## 2022-04-08 RX ADMIN — FENOFIBRATE 160 MG: 160 TABLET ORAL at 08:47

## 2022-04-08 RX ADMIN — BUSPIRONE HYDROCHLORIDE 5 MG: 5 TABLET ORAL at 08:47

## 2022-04-08 RX ADMIN — BENZOCAINE 1 LOZENGE: 2.6; 15 LOZENGE ORAL at 22:07

## 2022-04-08 RX ADMIN — IPRATROPIUM BROMIDE AND ALBUTEROL SULFATE 1 AMPULE: 2.5; .5 SOLUTION RESPIRATORY (INHALATION) at 06:54

## 2022-04-08 RX ADMIN — PRAMIPEXOLE DIHYDROCHLORIDE 0.5 MG: 0.25 TABLET ORAL at 20:26

## 2022-04-08 RX ADMIN — CARVEDILOL 12.5 MG: 12.5 TABLET, FILM COATED ORAL at 08:46

## 2022-04-08 RX ADMIN — GABAPENTIN 200 MG: 100 CAPSULE ORAL at 20:26

## 2022-04-08 RX ADMIN — PREDNISONE 40 MG: 20 TABLET ORAL at 22:08

## 2022-04-08 RX ADMIN — ALBUTEROL SULFATE 2.5 MG: 2.5 SOLUTION RESPIRATORY (INHALATION) at 04:28

## 2022-04-08 RX ADMIN — GUAIFENESIN 100 MG: 200 SOLUTION ORAL at 04:25

## 2022-04-08 RX ADMIN — ALBUTEROL SULFATE 2.5 MG: 2.5 SOLUTION RESPIRATORY (INHALATION) at 19:48

## 2022-04-08 RX ADMIN — ATORVASTATIN CALCIUM 40 MG: 40 TABLET, FILM COATED ORAL at 20:25

## 2022-04-08 RX ADMIN — CARVEDILOL 12.5 MG: 12.5 TABLET, FILM COATED ORAL at 20:26

## 2022-04-08 RX ADMIN — APIXABAN 5 MG: 5 TABLET, FILM COATED ORAL at 20:25

## 2022-04-08 RX ADMIN — TRAMADOL HYDROCHLORIDE 50 MG: 50 TABLET, COATED ORAL at 04:25

## 2022-04-08 RX ADMIN — GUAIFENESIN DEXTROMETHORPHAN HYDROBROMIDE ORAL SOLUTION 10 ML: 200; 20 SOLUTION ORAL at 22:08

## 2022-04-08 RX ADMIN — Medication 6 MG: at 20:25

## 2022-04-08 RX ADMIN — BENZONATATE 100 MG: 100 CAPSULE ORAL at 20:26

## 2022-04-08 RX ADMIN — GUAIFENESIN 100 MG: 200 SOLUTION ORAL at 20:26

## 2022-04-08 RX ADMIN — ALBUTEROL SULFATE 2.5 MG: 2.5 SOLUTION RESPIRATORY (INHALATION) at 14:50

## 2022-04-08 RX ADMIN — BENZONATATE 100 MG: 100 CAPSULE ORAL at 16:47

## 2022-04-08 ASSESSMENT — PAIN SCALES - WONG BAKER: WONGBAKER_NUMERICALRESPONSE: 6

## 2022-04-08 ASSESSMENT — PAIN SCALES - GENERAL
PAINLEVEL_OUTOF10: 0
PAINLEVEL_OUTOF10: 8
PAINLEVEL_OUTOF10: 10
PAINLEVEL_OUTOF10: 0
PAINLEVEL_OUTOF10: 8
PAINLEVEL_OUTOF10: 0

## 2022-04-08 ASSESSMENT — PAIN DESCRIPTION - PAIN TYPE
TYPE: CHRONIC PAIN
TYPE: CHRONIC PAIN

## 2022-04-08 ASSESSMENT — PAIN DESCRIPTION - LOCATION
LOCATION: CHEST
LOCATION: NECK;CHEST

## 2022-04-08 ASSESSMENT — PAIN DESCRIPTION - DESCRIPTORS: DESCRIPTORS: NAGGING

## 2022-04-08 NOTE — CARE COORDINATION
PA submitted through [address]:    Shannon Chadwick (Key: [id number]) - [id number]  Gabapentin 100MG capsules       Status: PA Request    Created: April 8th, 2022 [phone]    Sent: April 8th, 2022    Approvedtoday  Your request has been approved  Drug  Gabapentin 100MG capsules  Form  [employer] Electronic PA Form (0456 NCPDP)  Original Claim Info  [id number] (Hank Grace [phone])

## 2022-04-08 NOTE — PLAN OF CARE
Problem: Skin Integrity:  Goal: Will show no infection signs and symptoms  Description: Will show no infection signs and symptoms  4/8/2022 0206 by Lottie Elliott RN  Outcome: Ongoing  4/7/2022 1656 by Dillon Borges LPN  Outcome: Ongoing  Goal: Absence of new skin breakdown  Description: Absence of new skin breakdown  4/8/2022 0206 by Lottie Elliott RN  Outcome: Ongoing  4/7/2022 1656 by Dillon Borges LPN  Outcome: Ongoing     Problem: Falls - Risk of:  Goal: Will remain free from falls  Description: Will remain free from falls  4/8/2022 0206 by Lottie Elliott RN  Outcome: Ongoing  4/7/2022 1656 by Dillon Borges LPN  Outcome: Ongoing  Goal: Absence of physical injury  Description: Absence of physical injury  4/8/2022 0206 by Lottie Elliott RN  Outcome: Ongoing  4/7/2022 1656 by Dillon Borges LPN  Outcome: Ongoing     Problem: Pain:  Goal: Pain level will decrease  Description: Pain level will decrease  4/8/2022 0206 by Lottie Elliott RN  Outcome: Ongoing  4/7/2022 1656 by Dillon Borges LPN  Outcome: Ongoing  Goal: Control of acute pain  Description: Control of acute pain  4/8/2022 0206 by Lottie Elliott RN  Outcome: Ongoing  4/7/2022 1656 by Dillon Borges LPN  Outcome: Ongoing  Goal: Control of chronic pain  Description: Control of chronic pain  4/8/2022 0206 by Lottie Elliott RN  Outcome: Ongoing  4/7/2022 1656 by Dillon Borges LPN  Outcome: Ongoing     Problem: Mobility - Impaired:  Goal: Mobility will improve  Description: Mobility will improve  4/8/2022 0206 by Lottie Elliott RN  Outcome: Ongoing  4/7/2022 1656 by Dillon Borges LPN  Outcome: Ongoing     Problem: Musculor/Skeletal Functional Status  Goal: Highest potential functional level  4/8/2022 0206 by Lottie Elliott RN  Outcome: Ongoing  4/7/2022 1656 by Dillon Borges LPN  Outcome: Ongoing  Goal: Absence of falls  4/8/2022 0206 by Lottie Elliott RN  Outcome: Ongoing  4/7/2022 1656 by Chery Gardner LPN  Outcome: Ongoing     Problem: Nutrition  Goal: Optimal nutrition therapy  4/8/2022 0206 by Jayla Martinez RN  Outcome: Ongoing  4/7/2022 1656 by Chery Gardner LPN  Outcome: Ongoing

## 2022-04-08 NOTE — PROGRESS NOTES
[facility] Internal Medicine    CONSULTATION / HISTORY AND PHYSICAL EXAMINATION            Date:   4/7/2022  Patient name:  Malena Cadet  Date of admission:  3/23/2022  4:52 PM  MRN:   [id number]  Account:  [de-identified]  YOB: 1950  PCP:    Dell Alexandra MD  Room:   [facility]262-  Code Status:    Full Code    Physician Requesting Consult: Theresa Alves MD    Reason for Consult:  Medical management    Chief Complaint:     No chief complaint on file.   Debility    History Obtained From:     Patient, EMR, nursing staff    History of Present Illness:     80-year-old female initially presented to the multiple falls over several weeks in the setting of chronic cervical spine stenosis with myelopathy status post cervical fusion follows with neurosurgery  Recently started on Eliquis for acute DVT right leg  Trauma work-up CT brain in this admission unremarkable other than multiple remote and healing rib fractures, patient uses walker at home  Neurology review was obtained for worsening gait instability-MRI thoracic spine did show T2-T3 and T5-T7 moderate neural foraminal narrowing  Also patient was noted to have bilateral leg swelling with some stasis dermatitis, early cellulitis-started on diuresis as well as antibiotics  3/26   Patient feeling better  Feels that anxiety is better after starting buspar   Working with PT  3/28 ]  Patient complaining of cough, wheezing  She told the nurse that, cough is going on since she started lisinopril  Had wheezing, which improved with nebulization  3/29   Patient complaining of cough, shortness of breath, wheezing  Chest x-ray seen,  Had EKG, troponins which are flat  Past Medical History:     Past Medical History:   Diagnosis Date    Allergic rhinitis, cause unspecified     Back pain     lumbar    Bowel obstruction (HCC)     history of due to scar tissue, resolved non-surgically    C. difficile diarrhea     CAD (coronary artery disease)     no stent needed per pt.  Dr. Cheryle Berger did cath at [facility]    Cardiac murmur     Cellulitis     left leg    Cellulitis 2017 August    leg left leg/bug bite    Cerebral artery occlusion with cerebral infarction [facility])     TIA 2014    COVID-19     ONE YR AGO IN 4/25/2020 fever and cough    Diverticulosis of colon (without mention of hemorrhage)     GERD (gastroesophageal reflux disease)     GERD (gastroesophageal reflux disease)     on rx    History of blood transfusion     approx 2020        History of CHF (congestive heart failure)     History of MI (myocardial infarction) 2005    thought due to a blood clot    History of ovarian cyst 1970    had oopherectomy holly    History of peritonitis 1968    due to ruptured appendix age 13    HTN (hypertension)     Hx of blood clots     right leg    Hyperlipidemia     Intestinal or peritoneal adhesions with obstruction (postoperative) (postinfection) ([address]     Kidney infection     renal failure/sepsis/spider bite    Lateral epicondylitis  of elbow     MDRO (multiple drug resistant organisms) resistance     c diff    Muscle strain     right posterior shoulder    Other abnormal glucose     PONV (postoperative nausea and vomiting)     dry heaves    Pre-diabetes     Restless legs syndrome (RLS)     Snores     no cpap    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)     TIA (transient ischemic attack) 2014    Uses walker     Vitamin D deficiency     Wears glasses     Wellness examination     last seen 2 weeks ago        Past Surgical History:     Past Surgical History:   Procedure Laterality Date    ABDOMEN SURGERY  1976    benign tumor removed near remaining ovary, 1.5 pounds    APPENDECTOMY  1968    appendix ruptured, developed peritonitis    BACK SURGERY      BUNIONECTOMY Left     along with calcium deposits removed   [address]  2005    negative    CERVICAL FUSION  05/21/2021 POSTERIOR C3-6 LAMINECTOMY, PARTIAL C7 LAMINECTOMY, FUSION C3-C6, SILVERCORD    CERVICAL FUSION N/A 5/21/2021    POSTERIOR C3-6 LAMINECTOMY, PARTIAL C7 LAMINECTOMY, FUSION C3-C6, SILVERCORD performed by Marisol Cason DO at [address]    12 INCHES REMOVED D/T OBSTRUCTION    COLONOSCOPY      CYST REMOVAL Right     right facial    HYSTERECTOMY  1973    taken as a result of recurring cysts    LUMBAR FUSION N/A 02/10/2020    LUMBAR L4-5 POSTERIOR  DECOMPRESSION INSTRUMENTATION FUSION WCEMENT AUGMENTATION/ performed by Amada Barnett MD at [place] [id number] N/A 06/17/2020    L5-S1 PLIF L4-L5 REVISION performed by Amada Barnett MD at [address] [facility]  08/14/2014    FESS    OVARY REMOVAL  1970    UNILATERAL due to cyst    OVARY REMOVAL  1971    partial, due to cyst    SINUS SURGERY  2004    UPPER GASTROINTESTINAL ENDOSCOPY N/A 05/31/2019    EGD ESOPHAGOGASTRODUODENOSCOPY performed by Shaq Mishra MD at [address] [facility] N/A 08/05/2019    EGD BIOPSY performed by Mookie Vallejo MD at [address] [facility] N/A 08/23/2019    EGD BIOPSY performed by Shaq Mishra MD at [address] [facility] 03/05/2019    WRIST OPEN REDUCTION INTERNAL FIXATION performed by Derick Dumont MD at [address]        Medications Prior to Admission:     Prior to Admission medications    Medication Sig Start Date End Date Taking? Authorizing Provider   traMADol (ULTRAM) 50 MG tablet Take 1 tablet by mouth every 12 hours as needed (severe pain) for up to 7 days.  4/7/22 4/14/22 Yes Renetta Moody MD   acetaminophen (TYLENOL) 325 MG tablet Take 2 tablets by mouth every 4 hours as needed for Pain 4/7/22  Yes Renetta Moody MD   busPIRone (BUSPAR) 5 MG tablet Take 1 tablet by mouth 3 times daily 4/7/22  Yes Renetta Moody MD   albuterol sulfate HFA (VENTOLIN HFA) 108 (90 Base) MCG/ACT inhaler Inhale 2 puffs into the lungs 4 times daily as needed for Wheezing or Shortness of Breath 4/7/22  Yes Cassius Haynes MD   apixaban (ELIQUIS) 5 MG TABS tablet Take 1 tablet by mouth 2 times daily 4/7/22  Yes Cassius Haynes MD   gabapentin (NEURONTIN) 100 MG capsule Take 2 capsules by mouth 2 times daily for 30 days. 4/7/22 5/7/22 Yes Cassius Haynes MD   citalopram (CELEXA) 20 MG tablet Take 1 tablet by mouth daily 4/7/22  Yes Cassius Haynes MD   atorvastatin (LIPITOR) 40 MG tablet Take 1 tablet by mouth nightly 4/7/22  Yes Cassius Haynes MD   fenofibrate micronized (LOFIBRA) 134 MG capsule Take 1 capsule by mouth every morning (before breakfast) 4/7/22  Yes Cassius Haynes MD   losartan (COZAAR) 100 MG tablet Take 1 tablet by mouth daily 4/8/22  Yes Cassius Haynes MD   pramipexole (MIRAPEX) 0.5 MG tablet Take 1 tablet by mouth nightly 4/7/22  Yes Cassius Haynes MD   carvedilol (COREG) 12.5 MG tablet Take 1 tablet by mouth 2 times daily 4/7/22  Yes Cassius Haynes MD   amLODIPine (NORVASC) 5 MG tablet Take 1 tablet by mouth daily 4/7/22  Yes Cassius Haynes MD   lidocaine 4 % external patch Place 1 patch onto the skin daily as needed (shoulder pain) Apply patch to shoulder. Do not place over chest/sternum. Patch may remain in place for up to 12 hours in any 24 hour period.  4/7/22  Yes Cassius Haynes MD   furosemide (LASIX) 20 MG tablet Take 1 tablet by mouth 2 times daily 4/7/22  Yes Cassius Haynes MD   melatonin 3 MG TABS tablet Take 2 tablets by mouth nightly as needed (insomnia) 4/7/22  Yes Cassius Haynes MD   nitroGLYCERIN (NITROSTAT) 0.4 MG SL tablet Place 1 tablet under the tongue every 5 minutes as needed for Chest pain 9/1/21   Lexi Andrews MD   docusate sodium (COLACE) 100 MG capsule Take 1 capsule by mouth 2 times daily as needed for Constipation 5/30/21   Kentrell Marley,    cyanocobalamin ([employer] VITAMIN B12) 1000 MCG tablet Take 1 tablet by mouth daily 12/3/20   Wood Cade Crow MD   ferrous sulfate (IRON 325) 325 (65 Fe) MG tablet Take 1 tablet by mouth 2 times daily 7/2/20   Abena Galan MD   VITAMIN D, ERGOCALCIFEROL, PO Take by mouth    Historical Provider, MD   Lancets MISC 1 each by Does not apply route daily 10/10/19   Kailyn Barlow MD   blood glucose monitor strips Test 2 times a day & as needed for symptoms of irregular blood glucose. 10/10/19   Kailyn Barlow MD   Calcium Carbonate-Vitamin D (CALCIUM 500/D) 500-125 MG-UNIT TABS Take 1 tablet by mouth daily     Historical Provider, MD        Allergies:     Bactrim [sulfamethoxazole-trimethoprim], Codeine, and Seasonal    Social History:     Tobacco:    reports that she quit smoking about 4 years ago. Her smoking use included cigarettes. She started smoking about 26 years ago. She has a 10.00 pack-year smoking history. She has never used smokeless tobacco.  Alcohol:      reports no history of alcohol use. Drug Use:  reports no history of drug use. Family History:     Family History   Problem Relation Age of Onset    Stroke Mother     Diabetes Mother     Heart Disease Mother     High Blood Pressure Mother     Heart Disease Father     Heart Disease Brother     High Blood Pressure Brother     Heart Disease Maternal Grandmother     High Blood Pressure Sister        Review of Systems:     Positive and Negative as described in HPI. CONSTITUTIONAL:  negative for fevers, chills, sweats, fatigue, weight loss  HEENT:  negative for vision, hearing changes, runny nose, throat pain  RESPIRATORY: Positive for cough, shortness of breath, wheezing  CARDIOVASCULAR: Positive for chest, worse with cough.   GASTROINTESTINAL:  negative for nausea, vomiting, diarrhea, constipation, change in bowel habits, abdominal pain   GENITOURINARY:  negative for difficulty of urination, burning with urination, frequency   INTEGUMENT:  negative for rash, skin lesions, easy bruising   HEMATOLOGIC/LYMPHATIC:  negative for swelling/edema ALLERGIC/IMMUNOLOGIC:  negative for urticaria , itching  ENDOCRINE:  negative increase in drinking, increase in urination, hot or cold intolerance  MUSCULOSKELETAL:  negative joint pains, muscle aches, swelling of joints  NEUROLOGICAL:  negative for headaches, dizziness, lightheadedness, numbness, pain, tingling extremities      Physical Exam:     BP (!) 102/38   Pulse 70   Temp 98.2 °F (36.8 °C) (Oral)   Resp 20   Ht 5' 3\" (1.6 m)   Wt 180 lb 6.4 oz (81.8 kg)   SpO2 91%   BMI 31.96 kg/m²   Temp (24hrs), Av °F (36.7 °C), Min:97.7 °F (36.5 °C), Max:98.2 °F (36.8 °C)    No results for input(s): POCGLU in the last 72 hours. Intake/Output Summary (Last 24 hours) at 2022 1031  Last data filed at 2022 1930  Gross per 24 hour   Intake 480 ml   Output --   Net 480 ml       General Appearance:  alert, well appearing, and in no acute distress  Head:  normocephalic, atraumatic. Eye: no icterus, redness, pupils equal and reactive, extraocular eye movements intact, conjunctiva clear  Ear: normal external ear, no discharge, hearing intact  Nose:  no drainage noted  Mouth: mucous membranes moist  Neck: supple, no carotid bruits, thyroid not palpable  Lungs: Air entry but decreased, better wheezing present  Cardiovascular: normal rate, regular rhythm, no murmur, gallop, rub.   Abdomen: Soft, nontender, nondistended, normal bowel sounds, no hepatomegaly or splenomegaly  Neurologic: There are no new focal motor or sensory deficits, normal muscle tone and bulk, no abnormal sensation, normal speech, cranial nerves II through XII grossly intact  Skin: No gross lesions, rashes, bruising or bleeding on exposed skin area  Extremities:  peripheral pulses palpable, no pedal edema or calf pain with palpation  Psych: normal affect    Investigations:      Laboratory Testing:  Recent Results (from the past 24 hour(s))   CBC auto differential    Collection Time: 22  6:52 AM   Result Value Ref Range    WBC 7.6 3.5 - 11.0 k/uL    RBC 3.80 (L) 4.0 - 5.2 m/uL    Hemoglobin 11.7 (L) 12.0 - 16.0 g/dL    Hematocrit 35.3 (L) 36 - 46 %    MCV 92.8 80 - 100 fL    MCH 30.8 26 - 34 pg    MCHC 33.2 31 - 37 g/dL    RDW 13.7 11.5 - 14.9 %    Platelets 206 503 - 811 k/uL    MPV 7.0 6.0 - 12.0 fL    Seg Neutrophils 52 36 - 66 %    Lymphocytes 29 24 - 44 %    Monocytes 7 1 - 7 %    Eosinophils % 11 (H) 0 - 4 %    Basophils 1 0 - 2 %    Segs Absolute 4.00 1.3 - 9.1 k/uL    Absolute Lymph # 2.20 1.0 - 4.8 k/uL    Absolute Mono # 0.50 0.1 - 1.3 k/uL    Absolute Eos # 0.80 (H) 0.0 - 0.4 k/uL    Basophils Absolute 0.10 0.0 - 0.2 k/uL   Basic Metabolic Panel    Collection Time: 04/07/22  6:52 AM   Result Value Ref Range    Glucose 129 (H) 70 - 99 mg/dL    BUN 61 (H) 8 - 23 mg/dL    CREATININE 1.68 (H) 0.50 - 0.90 mg/dL    Calcium 8.9 8.6 - 10.4 mg/dL    Sodium 141 135 - 144 mmol/L    Potassium 4.8 3.7 - 5.3 mmol/L    Chloride 104 98 - 107 mmol/L    CO2 25 20 - 31 mmol/L    Anion Gap 12 9 - 17 mmol/L    GFR Non-African American 30 (L) >60 mL/min    GFR  36 (L) >60 mL/min    GFR Comment             Imaging/Diagonstics:  Recent data reviewed    Assessment :      Primary Problem  Cervical myelopathy ([facility]    Active Hospital Problems    Diagnosis Date Noted    Cervical myelopathy ([facility] [G95.9] 03/17/2022       Plan:     Multiple falls, gait instability, multiple remote and healing rib fractures leading to chest pain-needs PT OT  Neural foraminal narrowing of thoracic spine-neurology as reviewed-recommend rehab and physical therapy  Hypertension-continue amlodipine, Coreg  Right leg DVT-continue Eliquis  Chronic diastolic CHF-currently compensated-continue Coreg, Lipitor, Lasix, lisinopril  Depression -patient noted to be very tearful today-is already on maximal dose of  Celexa, will add BuSpar    DVT prophylaxis-patient on Eliquis    3/27   But has readings of low blood pressure, asymptomatic  Reducing dose of Norvasc to 5 from 10    3/28 Patient has chronic cough, started since she is put on ACE inhibitor  May have ACE inhibitor induced cough, will discontinue lisinopril, start patient on Cozaar  Started patient given nebulization  Has history of smoking  Chest x-ray seen   3/29   Suspecting symptoms are due to acute bronchitis,  Ordering Z-Han, prednisone  EKG seen, troponins are flat  Chest x-ray seen  We will monitor  4/3  Patient shortness of breath is getting better being on steroids  Oxygen requirement going down   We will try to wean oxygen, discussed with RN  Completed antibiotics and steroid    4/4  SOB, little better   On NC o2, 2L  Wean off o2  PT/OT   Labs and vitals reviewed       4/5  Multiple falls, gait instability,  Working with physical therapy,  Improving,  Chronic respiratory failure, on 2 L of oxygen, wean off oxygen,  Morbid obesity, low-calorie diet,  PT OT,  Labs and vitals reviewed,  Medications and their side effects reviewed    4/6  Multiple falls, gait instability,  Chronic respiratory failure with 2 to 3 L of oxygen, wean off oxygen,  Morbid obesity, low-calorie diet,  Labs, radiology, vitals, medications reviewed,  Continue physical therapy,    4/7  Thoracic spine narrowing , LE weakness,   Multiple falls, gait instability,  Cr worsening 1.3->1.68, will hold lasix, losartan, recheck BMP next am,   May start fluids if cr didn't get better   Echo reviewed from 2018,   EF 88%, grade 1 Diastolic dyfn      Chronic respiratory failure with 2 to 3 L of oxygen, wean off oxygen,  Morbid obesity, low-calorie diet,  Labs, radiology, vitals, medications reviewed,  Continue physical therapy,      Consultations:     IP CONSULT TO DIETITIAN  IP CONSULT TO SOCIAL WORK  IP CONSULT TO [address] [place] [address]      Lonny Millard MD  4/7/2022  10:54 PM    Copy sent to Dr. Erling Mcardle, MD    Please note that this chart was generated using voice recognition Dragon dictation software.   Although every effort was made to ensure the accuracy of this automated transcription, some errors in transcription may have occurred.

## 2022-04-08 NOTE — PROGRESS NOTES
Comprehensive Nutrition Assessment    Type and Reason for Visit:  Reassess    Nutrition Recommendations/Plan: Continue current diet. Nutrition Assessment:  Nurse working with pt at time of attempted visit. Visitor helped select pt menu for dinner. PO intake appears to be % of most meals. Malnutrition Assessment:  Malnutrition Status:  No malnutrition      Estimated Daily Nutrient Needs:  Energy (kcal):  7102-2233 kcals based on [place] with 1.2-1.3 factor using adm wt 81.2 kg; Weight Used for Energy Requirements:  Admission     Protein (g):  62-68 gm protein based on 1.2-1.3 gm/kg using IBW; Weight Used for Protein Requirements:  Ideal          Nutrition Related Findings:  Edema: +2 generalized, +1 Pitting RLE, LLE. Glucose: 110, BUN: 65, Cr: 1.56. Other labs and meds reviewed. Wounds:  None       Current Nutrition Therapies:    ADULT DIET; Regular; 4 carb choices (60 gm/meal)    Anthropometric Measures:  · Height: 5' 3\" (160 cm)  · Current Body Weight: 180 lb 5.4 oz (81.8 kg)   · Admission Body Weight: 179 lb (81.2 kg)    · Usual Body Weight:       · Ideal Body Weight: 115 lbs; % Ideal Body Weight 155.7 %   · BMI: 32  · BMI Categories: Obese Class 1 (BMI 30.0-34. 9)       Nutrition Diagnosis:   · Overweight/Obese related to excessive energy intake (Hx of) as evidenced by BMI    Nutrition Interventions:   Food and/or Nutrient Delivery:  Continue Current Diet  Nutrition Education/Counseling:  No recommendation at this time   Coordination of Nutrition Care:  Continue to monitor while inpatient    Goals:  Avoid wt gain       Nutrition Monitoring and Evaluation:   Behavioral-Environmental Outcomes:  None Identified   Food/Nutrient Intake Outcomes:  Food and Nutrient Intake  Physical Signs/Symptoms Outcomes:  Biochemical Data,Fluid Status or Edema,Nutrition Focused Physical Findings,Skin,Weight     Discharge Planning:    Continue current diet     Some areas of assessment may be incomplete due to standard COVID-19 Precautions. Isabela Lopez R.D., L.D.   Phone: [phone]

## 2022-04-08 NOTE — PROGRESS NOTES
BRONCHOSPASM/BRONCHOCONSTRICTION     [x]         IMPROVE AERATION/BREATH SOUNDS  [x]   ADMINISTER BRONCHODILATOR THERAPY AS APPROPRIATE  [x]   ASSESS BREATH SOUNDS  []   IMPLEMENT AEROSOL/MDI PROTOCOL  [x]   PATIENT EDUCATION AS NEEDED    PROVIDE ADEQUATE OXYGENATION WITH ACCEPTABLE SP02/ABG'S    [x]  IDENTIFY APPROPRIATE OXYGEN THERAPY  [x]   MONITOR SP02/ABG'S AS NEEDED   [x]   PATIENT EDUCATION AS NEEDED    Pt currently on room air, pt having new onset on non productive barky cough. Expiratory wheezing throughout. Pt states she had been coughing all night. After speaking with pts nurse, I was informed that the cough is not a new onset and its d/t a certain medication pt is on along with having bronchitis.

## 2022-04-08 NOTE — PROGRESS NOTES
Physical Therapy  Facility/Department: [facility]  Daily Treatment Note  NAME: Jose Cruz Chang  : 1950  MRN: [id number]    Date of Service: 2022    Discharge Recommendations:  Patient would benefit from continued therapy after discharge        Assessment      Activity Tolerance  Activity Tolerance: Patient limited by pain; Patient limited by endurance; Patient limited by fatigue  Activity Tolerance: neck pain; chest pain when coughing     Patient Diagnosis(es): The primary encounter diagnosis was Cervical myelopathy ([facility] [address] Diagnoses of Depression, unspecified depression type, Cerebrovascular accident (CVA), unspecified mechanism ([address] Hyperlipidemia, unspecified hyperlipidemia type, RLS (restless legs syndrome), Essential hypertension, Localized swelling of both lower legs, Chronic diastolic heart failure ([address] and S/P cervical spinal fusion were also pertinent to this visit.      has a past medical history of Allergic rhinitis, cause unspecified, Back pain, Bowel obstruction (HCC), C. difficile diarrhea, CAD (coronary artery disease), Cardiac murmur, Cellulitis, Cellulitis, Cerebral artery occlusion with cerebral infarction ([address] COVID-19, Diverticulosis of colon (without mention of hemorrhage), GERD (gastroesophageal reflux disease), GERD (gastroesophageal reflux disease), History of blood transfusion, History of CHF (congestive heart failure), History of MI (myocardial infarction), History of ovarian cyst, History of peritonitis, HTN (hypertension), Hx of blood clots, Hyperlipidemia, Intestinal or peritoneal adhesions with obstruction (postoperative) (postinfection) ([address] Kidney infection, Lateral epicondylitis  of elbow, MDRO (multiple drug resistant organisms) resistance, Muscle strain, Other abnormal glucose, PONV (postoperative nausea and vomiting), Pre-diabetes, Restless legs syndrome (RLS), Snores, 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[address] TIA (transient ischemic attack), Uses walker, Vitamin D deficiency, Wears glasses, and Wellness examination. has a past surgical history that includes Ovary removal (1970); colectomy (5174); Appendectomy (1968); Ovary removal (1971); Hysterectomy (1973); Bunionectomy (Left); sinus surgery (2004); Colonoscopy; other surgical history (08/14/2014); cyst removal (Right); Wrist fracture surgery (Left, 03/05/2019); Upper gastrointestinal endoscopy (N/A, 05/31/2019); Upper gastrointestinal endoscopy (N/A, 08/05/2019); Upper gastrointestinal endoscopy (N/A, 08/23/2019); Abdomen surgery (1976); lumbar fusion (N/A, 02/10/2020); Cardiac catheterization; Cardiac catheterization (2005); [place] tooth extraction; lumbar fusion (N/A, 06/17/2020); back surgery; cervical fusion (05/21/2021); and cervical fusion (N/A, 5/21/2021). Restrictions  Restrictions/Precautions  Restrictions/Precautions: General Precautions,Fall Risk  Required Braces or Orthoses?: No  Implants present? : Metal implants (cervical and lumbar surgeries, L wrist ORIF)  Position Activity Restriction  Other position/activity restrictions: up as tolerated; O2 3L/m via NC     Subjective   General  Chart Reviewed: Yes  Additional Pertinent Hx: TIA  Response To Previous Treatment: Patient with no complaints from previous session. Family / Caregiver Present: No  Referring Practitioner: Dr Emily Gonzalez. Subjective  Subjective: Patient reported fatigue, but feels that she could go homes safely today.   General Comment  Comments: Pt is able to recall the suggested comon everyday occurrences (turning a page while reading, starting a level on her game, someone calling her name or talking to her) to use as cues/reminders to assess her posture, throughout the day + tips for posture during AMB, but she quickly loses her improved posture while walking  Pain Screening  Patient Currently in Pain: Yes  Pain Assessment  Pain Assessment: Faces  Stanley-Baker Pain Rating: Hurts even more  Pain Type: Chronic pain  Pain Location: Neck;Chest  Non-Pharmaceutical Pain Intervention(s): Repositioned;Rest;Ambulation/Increased Activity; Distraction; Emotional support  Response to Pain Intervention: Patient Satisfied  Vital Signs  Patient Currently in Pain: Yes       Orientation  Orientation  Overall Orientation Status: Within Normal Limits    Objective   Bed mobility  Rolling to Left: Stand by assistance  Rolling to Right: Stand by assistance  Supine to Sit: Stand by assistance  Sit to Supine: Stand by assistance  Scooting: Contact guard assistance  Transfers  Sit to Stand: Contact guard assistance (w/ RW + VC's for technique)  Stand to sit: Contact guard assistance (w/ RW + VC's for technique)  Bed to Chair: Contact guard assistance;Minimal assistance (w/ RW + VC's for technique)  Stand Pivot Transfers: Contact guard assistance;Minimal Assistance (w/ RW + VC's for technique)  Comment: Hx of pushing backwards and relying on back of legs for support against sitting surface.  Does much better when cued to scoot forward to edge of chair/bed, position her feet, and to bring her \"nose over her toes\"  Ambulation  Ambulation?: Yes  Ambulation 1  Surface: level tile  Device: Rolling Walker  Assistance: Contact guard assistance;Minimal assistance (Marilyn to bring RW closer to the pt)  Quality of Gait: slow tawanda with step to pattern, increase tone in LLE causing decrease knee flexion and flat foot LE at contact with amb, decreased stance on L LE; downward gaze and cervical flexion; slight drift to L side (cues to correct all, with fair but fleeting response from pt)  Gait Deviations: Slow Tawanda;Decreased step length;Decreased step height  Distance: 20ft x4  Stairs/Curb  Stairs?: Yes  Stairs  # Steps : 10  Stairs Height:  (4\" & 6\")  Rails: Bilateral  Device: No Device  Assistance: Contact guard assistance  Comment: step-to pattern     Balance  Posture: Fair  Sitting - Static: Good  Sitting - Dynamic: Fair  Standing - Static: Fair;+  Standing - Dynamic: Fair  Comments: Standing with RW. Other exercises  Other exercises?: Yes  Other exercises 1: Seated Ex: BLE x15  Other exercises 2: NuStep x 10 mins  Other exercises 9: Review of posture       OutComes Score  Balance Score: 12 (04/07/22 1008)  Gait Score: 5 (04/07/22 1008)        Tinetti Total Score: 17 (04/07/22 1008)      Goals  Short term goals  Time Frame for Short term goals: 1 week  Short term goal 1: Pt to demostrate bed mobility CGA/Jennifer. Short term goal 2: Pt to perform transfers CGA. Short term goal 3: Pt to amb 100'-150' with device, CGA. Short term goal 4: Pt to improve BLE strength by 1/2 MMG. Short term goal 5: Pt to improve standing balance to [facility]. Short term goal 6: Pt able to perform 4\" and 6\" steps x3 in //bars or acute stair way, with 2 B UE support, min A  Long term goals  Time Frame for Long term goals : By DC  Long term goal 1: Pt able to perform bed mobility at [facility]  Long term goal 2: Pt able to transfer at supervsion level. Long term goal 3:  Pt able to ambulate house hold distances with assistive device mod-I  Long term goal 4:  Pt able to ambulate distance of 150 ft, level surfaces and shorter distance outside terraine at [facility]. Long term goal 5: Pt able to propel w/c on level surface, distance of 150 ft, supervsion level. Long term goal 6: Improve 2MWT distance to atleast 120 ft to improve overall fucntion. Long term goal 7: Pt to improve Tinetti balance score to atlest 20/28 to reduce fall risk. Long term goal 8: Pt able to perform curb step with B UE support and/or donny to goup and down 4 to 5 steps with 2 rails at min A   Patient Goals   Patient goals : To get stronger    Plan    Plan  Times per week: 1.5 hr/day, 5 to 7 days/week.   Specific instructions for Next Treatment: transfers, amb, balance, standing program  Current Treatment Recommendations: Strengthening,Balance Training,Functional Mobility Training,Transfer Training,Endurance Training,Gait Training,Equipment Evaluation, Education, & procurement,Patient/Caregiver Education & Training,Safety Education & Training,Stair training,Wheelchair Mobility Training  Safety Devices  Type of devices:  All fall risk precautions in place,Gait belt,Left in chair,Call light within reach,Patient at risk for falls     Therapy Time     04/08/22 1005   PT Individual Minutes   Time In 1005   Time Out [address] Trice Salgado, PTA

## 2022-04-08 NOTE — CARE COORDINATION
ONGOING DISCHARGE PLAN:    Patient's discharge is on hold d/t elevated creatinine. [address] notified/ family notified.  Nurses notified    Electronically signed by Hima Urbina RN on 4/8/2022 at 10:43 AM

## 2022-04-08 NOTE — PLAN OF CARE
Problem: Skin Integrity:  Goal: Will show no infection signs and symptoms  Description: Will show no infection signs and symptoms  4/8/2022 1032 by Elizabeth Ghosh LPN  Outcome: Met This Shift  4/8/2022 0206 by Anitra Giles RN  Outcome: Ongoing  Goal: Absence of new skin breakdown  Description: Absence of new skin breakdown  4/8/2022 1032 by Elizabeth Ghosh LPN  Outcome: Met This Shift  4/8/2022 0206 by Anitra Giles RN  Outcome: Ongoing     Problem: Falls - Risk of:  Goal: Will remain free from falls  Description: Will remain free from falls  4/8/2022 1032 by Elizabeth Ghosh LPN  Outcome: Met This Shift  4/8/2022 0206 by Anitra Giles RN  Outcome: Ongoing  Goal: Absence of physical injury  Description: Absence of physical injury  4/8/2022 1032 by Elizabeth Ghosh LPN  Outcome: Met This Shift  4/8/2022 0206 by Anitra Giles RN  Outcome: Ongoing     Problem: Pain:  Goal: Pain level will decrease  Description: Pain level will decrease  4/8/2022 1032 by Elizabeth Ghosh LPN  Outcome: Met This Shift  4/8/2022 0206 by Anitra Giles RN  Outcome: Ongoing  Goal: Control of acute pain  Description: Control of acute pain  4/8/2022 1032 by Elizabeth Ghosh LPN  Outcome: Met This Shift  4/8/2022 0206 by Anitra Giles RN  Outcome: Ongoing  Goal: Control of chronic pain  Description: Control of chronic pain  4/8/2022 1032 by Elizabeth Ghosh LPN  Outcome: Met This Shift  4/8/2022 0206 by Anitra Giles RN  Outcome: Ongoing     Problem: Mobility - Impaired:  Goal: Mobility will improve  Description: Mobility will improve  4/8/2022 1032 by Elizabeth Ghosh LPN  Outcome: Met This Shift  4/8/2022 0206 by Anitra Giles RN  Outcome: Ongoing     Problem: Musculor/Skeletal Functional Status  Goal: Highest potential functional level  4/8/2022 1032 by Elizabeth Ghosh LPN  Outcome: Met This Shift  4/8/2022 0206 by Anitra Giles RN  Outcome: Ongoing  Goal: Absence of falls  4/8/2022 1032 by Jomar Vazquez LPN  Outcome: Met This Shift  4/8/2022 0206 by Nidhi Barone RN  Outcome: Ongoing     Problem: Musculor/Skeletal Functional Status  Goal: Highest potential functional level  4/8/2022 1032 by Jomar Vazquez LPN  Outcome: Met This Shift  4/8/2022 0206 by Nidhi Barone RN  Outcome: Ongoing  Goal: Absence of falls  4/8/2022 1032 by Jomar Vazquez LPN  Outcome: Met This Shift  4/8/2022 0206 by Nidhi Barone RN  Outcome: Ongoing     Problem: Nutrition  Goal: Optimal nutrition therapy  4/8/2022 1032 by Jomar Vazquez LPN  Outcome: Met This Shift  4/8/2022 0206 by Nidhi Barone RN  Outcome: Ongoing

## 2022-04-09 ENCOUNTER — APPOINTMENT (OUTPATIENT)
Dept: CT IMAGING | Age: 72
DRG: 949 | End: 2022-04-09
Attending: PHYSICAL MEDICINE & REHABILITATION
Payer: MEDICARE

## 2022-04-09 LAB
ANION GAP SERPL CALCULATED.3IONS-SCNC: 13 MMOL/L (ref 9–17)
BUN BLDV-MCNC: 55 MG/DL (ref 8–23)
CALCIUM SERPL-MCNC: 9.4 MG/DL (ref 8.6–10.4)
CHLORIDE BLD-SCNC: 101 MMOL/L (ref 98–107)
CO2: 22 MMOL/L (ref 20–31)
CREAT SERPL-MCNC: 1.27 MG/DL (ref 0.5–0.9)
GFR AFRICAN AMERICAN: 50 ML/MIN
GFR NON-AFRICAN AMERICAN: 41 ML/MIN
GFR SERPL CREATININE-BSD FRML MDRD: ABNORMAL ML/MIN/{1.73_M2}
GLUCOSE BLD-MCNC: 170 MG/DL (ref 70–99)
POTASSIUM SERPL-SCNC: 4.3 MMOL/L (ref 3.7–5.3)
SODIUM BLD-SCNC: 136 MMOL/L (ref 135–144)

## 2022-04-09 PROCEDURE — 97110 THERAPEUTIC EXERCISES: CPT

## 2022-04-09 PROCEDURE — 6370000000 HC RX 637 (ALT 250 FOR IP): Performed by: PHYSICAL MEDICINE & REHABILITATION

## 2022-04-09 PROCEDURE — 1180000000 HC REHAB R&B

## 2022-04-09 PROCEDURE — 97530 THERAPEUTIC ACTIVITIES: CPT

## 2022-04-09 PROCEDURE — 6370000000 HC RX 637 (ALT 250 FOR IP): Performed by: STUDENT IN AN ORGANIZED HEALTH CARE EDUCATION/TRAINING PROGRAM

## 2022-04-09 PROCEDURE — 2700000000 HC OXYGEN THERAPY PER DAY

## 2022-04-09 PROCEDURE — 97116 GAIT TRAINING THERAPY: CPT

## 2022-04-09 PROCEDURE — 94761 N-INVAS EAR/PLS OXIMETRY MLT: CPT

## 2022-04-09 PROCEDURE — 6370000000 HC RX 637 (ALT 250 FOR IP): Performed by: INTERNAL MEDICINE

## 2022-04-09 PROCEDURE — 97535 SELF CARE MNGMENT TRAINING: CPT

## 2022-04-09 PROCEDURE — 94640 AIRWAY INHALATION TREATMENT: CPT

## 2022-04-09 PROCEDURE — 36415 COLL VENOUS BLD VENIPUNCTURE: CPT

## 2022-04-09 PROCEDURE — 71250 CT THORAX DX C-: CPT

## 2022-04-09 PROCEDURE — 99232 SBSQ HOSP IP/OBS MODERATE 35: CPT | Performed by: INTERNAL MEDICINE

## 2022-04-09 PROCEDURE — 99232 SBSQ HOSP IP/OBS MODERATE 35: CPT | Performed by: STUDENT IN AN ORGANIZED HEALTH CARE EDUCATION/TRAINING PROGRAM

## 2022-04-09 PROCEDURE — 6370000000 HC RX 637 (ALT 250 FOR IP): Performed by: NURSE PRACTITIONER

## 2022-04-09 PROCEDURE — 80048 BASIC METABOLIC PNL TOTAL CA: CPT

## 2022-04-09 RX ORDER — PREDNISONE 20 MG/1
40 TABLET ORAL DAILY
Status: COMPLETED | OUTPATIENT
Start: 2022-04-09 | End: 2022-04-12

## 2022-04-09 RX ADMIN — AMLODIPINE BESYLATE 5 MG: 5 TABLET ORAL at 08:44

## 2022-04-09 RX ADMIN — FENOFIBRATE 160 MG: 160 TABLET ORAL at 08:44

## 2022-04-09 RX ADMIN — BENZONATATE 100 MG: 100 CAPSULE ORAL at 21:02

## 2022-04-09 RX ADMIN — Medication 6 MG: at 21:02

## 2022-04-09 RX ADMIN — IPRATROPIUM BROMIDE AND ALBUTEROL SULFATE 1 AMPULE: 2.5; .5 SOLUTION RESPIRATORY (INHALATION) at 15:22

## 2022-04-09 RX ADMIN — ATORVASTATIN CALCIUM 40 MG: 40 TABLET, FILM COATED ORAL at 21:03

## 2022-04-09 RX ADMIN — GABAPENTIN 200 MG: 100 CAPSULE ORAL at 08:44

## 2022-04-09 RX ADMIN — BENZONATATE 100 MG: 100 CAPSULE ORAL at 06:07

## 2022-04-09 RX ADMIN — POLYETHYLENE GLYCOL 3350 17 G: 17 POWDER, FOR SOLUTION ORAL at 08:43

## 2022-04-09 RX ADMIN — ACETAMINOPHEN 650 MG: 325 TABLET ORAL at 06:07

## 2022-04-09 RX ADMIN — GUAIFENESIN DEXTROMETHORPHAN HYDROBROMIDE ORAL SOLUTION 10 ML: 200; 20 SOLUTION ORAL at 06:06

## 2022-04-09 RX ADMIN — FERROUS SULFATE TAB 325 MG (65 MG ELEMENTAL FE) 325 MG: 325 (65 FE) TAB at 08:43

## 2022-04-09 RX ADMIN — CYANOCOBALAMIN TAB 1000 MCG 1000 MCG: 1000 TAB at 08:44

## 2022-04-09 RX ADMIN — CARVEDILOL 12.5 MG: 12.5 TABLET, FILM COATED ORAL at 08:44

## 2022-04-09 RX ADMIN — CITALOPRAM HYDROBROMIDE 20 MG: 20 TABLET ORAL at 08:44

## 2022-04-09 RX ADMIN — PREDNISONE 40 MG: 20 TABLET ORAL at 16:45

## 2022-04-09 RX ADMIN — BUSPIRONE HYDROCHLORIDE 5 MG: 5 TABLET ORAL at 21:07

## 2022-04-09 RX ADMIN — APIXABAN 5 MG: 5 TABLET, FILM COATED ORAL at 21:02

## 2022-04-09 RX ADMIN — IPRATROPIUM BROMIDE AND ALBUTEROL SULFATE 1 AMPULE: 2.5; .5 SOLUTION RESPIRATORY (INHALATION) at 12:56

## 2022-04-09 RX ADMIN — IPRATROPIUM BROMIDE AND ALBUTEROL SULFATE 1 AMPULE: 2.5; .5 SOLUTION RESPIRATORY (INHALATION) at 08:35

## 2022-04-09 RX ADMIN — GABAPENTIN 200 MG: 100 CAPSULE ORAL at 21:01

## 2022-04-09 RX ADMIN — FERROUS SULFATE TAB 325 MG (65 MG ELEMENTAL FE) 325 MG: 325 (65 FE) TAB at 21:03

## 2022-04-09 RX ADMIN — LOSARTAN POTASSIUM 100 MG: 100 TABLET, FILM COATED ORAL at 08:44

## 2022-04-09 RX ADMIN — IPRATROPIUM BROMIDE AND ALBUTEROL SULFATE 1 AMPULE: 2.5; .5 SOLUTION RESPIRATORY (INHALATION) at 19:39

## 2022-04-09 RX ADMIN — CARVEDILOL 12.5 MG: 12.5 TABLET, FILM COATED ORAL at 21:03

## 2022-04-09 RX ADMIN — BUSPIRONE HYDROCHLORIDE 5 MG: 5 TABLET ORAL at 08:45

## 2022-04-09 RX ADMIN — BUSPIRONE HYDROCHLORIDE 5 MG: 5 TABLET ORAL at 16:45

## 2022-04-09 RX ADMIN — CALCIUM CARBONATE 600 MG (1,500 MG)-VITAMIN D3 400 UNIT TABLET 1 TABLET: at 08:44

## 2022-04-09 RX ADMIN — BENZONATATE 100 MG: 100 CAPSULE ORAL at 17:37

## 2022-04-09 RX ADMIN — PRAMIPEXOLE DIHYDROCHLORIDE 0.5 MG: 0.25 TABLET ORAL at 21:01

## 2022-04-09 RX ADMIN — BENZOCAINE 1 LOZENGE: 2.6; 15 LOZENGE ORAL at 06:07

## 2022-04-09 RX ADMIN — APIXABAN 5 MG: 5 TABLET, FILM COATED ORAL at 08:44

## 2022-04-09 ASSESSMENT — PAIN SCALES - GENERAL
PAINLEVEL_OUTOF10: 5
PAINLEVEL_OUTOF10: 7
PAINLEVEL_OUTOF10: 3
PAINLEVEL_OUTOF10: 5

## 2022-04-09 ASSESSMENT — PAIN DESCRIPTION - LOCATION
LOCATION: CHEST

## 2022-04-09 ASSESSMENT — PAIN DESCRIPTION - DESCRIPTORS
DESCRIPTORS: ACHING;CONSTANT
DESCRIPTORS: ACHING;CONSTANT

## 2022-04-09 ASSESSMENT — PAIN DESCRIPTION - ORIENTATION
ORIENTATION: MID;RIGHT;LEFT
ORIENTATION: MID;RIGHT;LEFT

## 2022-04-09 ASSESSMENT — PAIN DESCRIPTION - PAIN TYPE: TYPE: ACUTE PAIN

## 2022-04-09 NOTE — PROGRESS NOTES
Physical Therapy  Facility/Department: [facility]  Daily Treatment Note  NAME: Emanuel Loera  : 1950  MRN: [id number]    Date of Service: 2022    Discharge Recommendations:  Patient would benefit from continued therapy after discharge        Assessment   Body structures, Functions, Activity limitations: Decreased functional mobility ; Decreased ADL status; Decreased ROM; Decreased strength;Decreased endurance;Decreased balance;Decreased posture; Increased pain  Specific instructions for Next Treatment: transfers, amb, balance, standing program  REQUIRES PT FOLLOW UP: Yes  Activity Tolerance  Activity Tolerance: Patient limited by endurance; Patient limited by fatigue;Patient limited by pain  Activity Tolerance: Chest pain with coughing. Rest breaks as needed     Patient Diagnosis(es): The primary encounter diagnosis was Cervical myelopathy ([facility] [address] Diagnoses of Depression, unspecified depression type, Cerebrovascular accident (CVA), unspecified mechanism ([address] Hyperlipidemia, unspecified hyperlipidemia type, RLS (restless legs syndrome), Essential hypertension, Localized swelling of both lower legs, Chronic diastolic heart failure ([address] and S/P cervical spinal fusion were also pertinent to this visit.      has a past medical history of Allergic rhinitis, cause unspecified, Back pain, Bowel obstruction (HCC), C. difficile diarrhea, CAD (coronary artery disease), Cardiac murmur, Cellulitis, Cellulitis, Cerebral artery occlusion with cerebral infarction ([address] COVID-19, Diverticulosis of colon (without mention of hemorrhage), GERD (gastroesophageal reflux disease), GERD (gastroesophageal reflux disease), History of blood transfusion, History of CHF (congestive heart failure), History of MI (myocardial infarction), History of ovarian cyst, History of peritonitis, HTN (hypertension), Hx of blood clots, Hyperlipidemia, Intestinal or peritoneal adhesions with obstruction (postoperative) (postinfection) ([address] Kidney infection, Lateral epicondylitis  of elbow, MDRO (multiple drug resistant organisms) resistance, Muscle strain, Other abnormal glucose, PONV (postoperative nausea and vomiting), Pre-diabetes, Restless legs syndrome (RLS), Snores, 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[facility] [address] TIA (transient ischemic attack), Uses walker, Vitamin D deficiency, Wears glasses, and Wellness examination. has a past surgical history that includes Ovary removal (1970); colectomy (4195); Appendectomy (1968); Ovary removal (1971); Hysterectomy (1973); Bunionectomy (Left); sinus surgery (2004); Colonoscopy; other surgical history (08/14/2014); cyst removal (Right); Wrist fracture surgery (Left, 03/05/2019); Upper gastrointestinal endoscopy (N/A, 05/31/2019); Upper gastrointestinal endoscopy (N/A, 08/05/2019); Upper gastrointestinal endoscopy (N/A, 08/23/2019); Abdomen surgery (1976); lumbar fusion (N/A, 02/10/2020); Cardiac catheterization; Cardiac catheterization (2005); [place] tooth extraction; lumbar fusion (N/A, 06/17/2020); back surgery; cervical fusion (05/21/2021); and cervical fusion (N/A, 5/21/2021). Restrictions  Restrictions/Precautions  Restrictions/Precautions: General Precautions,Fall Risk  Required Braces or Orthoses?: No  Implants present? : Metal implants (cervical and lumbar surgeries, L wrist ORIF)  Position Activity Restriction  Other position/activity restrictions: up as tolerated; O2 3L/m via NC  Subjective   General  Chart Reviewed: Yes  Additional Pertinent Hx: TIA  Response To Previous Treatment: Patient reporting fatigue but able to participate. Family / Caregiver Present: No  Referring Practitioner: Dr Monroe Billings. Subjective  Subjective: Pt reports she had a bad night last night with intense coughing and wheezing through the night, and inability to breathe. States she was unable to sleep until nursing administered meds to relieve symptoms. Pt was also placed back on 2L O2.  Able to continue with therapy  Pain Screening  Patient Currently in Pain: Yes  Pain Assessment  Pain Assessment: 0-10  Pain Level: 5  Pain Location: Chest  Vital Signs  Patient Currently in Pain: Yes  Oxygen Therapy  O2 Device: Nasal cannula  O2 Flow Rate (L/min): 2 L/min  Patient Observation  Observations: Pt on 2L O2       Orientation  Orientation  Overall Orientation Status: Within Normal Limits  Cognition      Objective      Transfers  Sit to Stand: Contact guard assistance (w/ RW + VC's for technique)  Stand to sit: Contact guard assistance (w/ RW + VC's for technique)  Bed to Chair: Contact guard assistance;Minimal assistance (w/ RW + VC's for technique)  Stand Pivot Transfers: Contact guard assistance;Minimal Assistance (w/ RW + VC's for technique)  Comment: Hx of pushing backwards and relying on back of legs for support against sitting surface. Does much better when cued to scoot forward to edge of chair/bed, position her feet, and to bring her \"nose over her toes\"  Ambulation  Ambulation?: Yes  More Ambulation?: Yes  Ambulation 1  Surface: level tile  Device: Rolling Walker  Other Apparatus: Wheelchair follow  Assistance: Contact guard assistance;Minimal assistance (Marilyn to bring RW closer to the pt)  Quality of Gait: Slow tawanda, Lack on hip and knee flexion on LLE, minimizing step height. Cues to  LLE more and promote Heel-toe strike, with poor carryover. Gait Deviations: Slow Tawanda;Decreased step length;Decreased step height  Distance: 50' , 20' , 21'  Comments: Short seated rest breaks between distances  Stairs/Curb  Stairs?: Yes  Stairs  # Steps : 10  Stairs Height:  (4\" & 6\")  Rails: Bilateral  Device: No Device  Assistance: Contact guard assistance  Comment: step-to pattern. Cues to remind pt, \"up with good, down with bad\"     Balance  Posture: Fair  Sitting - Static: Good  Sitting - Dynamic: Fair  Standing - Static: Fair;+  Standing - Dynamic: Fair  Comments: Standing with RW.   Other exercises  Other exercises?: Yes  Other exercises 1: Seated Ex: BLE x15 (Yellow tband)  Other exercises 2: NuStep x 10 mins  Other exercises 3: Standing Ex: BLE x10 (3way hip, Heel/toe, march, HS curl, mini squats)  Other exercises 10: STS transfers: x10                         G-Code     OutComes Score                                                     AM-PAC Score             Goals  Short term goals  Time Frame for Short term goals: 1 week  Short term goal 1: Pt to demostrate bed mobility CGA/Jennifer. Short term goal 2: Pt to perform transfers CGA. Short term goal 3: Pt to amb 100'-150' with device, CGA. Short term goal 4: Pt to improve BLE strength by 1/2 MMG. Short term goal 5: Pt to improve standing balance to [facility]. Short term goal 6: Pt able to perform 4\" and 6\" steps x3 in //bars or acute stair way, with 2 B UE support, min A  Long term goals  Time Frame for Long term goals : By DC  Long term goal 1: Pt able to perform bed mobility at A  Long term goal 2: Pt able to transfer at supervsion level. Long term goal 3:  Pt able to ambulate house hold distances with assistive device mod-I  Long term goal 4:  Pt able to ambulate distance of 150 ft, level surfaces and shorter distance outside terraine at [facility]. Long term goal 5: Pt able to propel w/c on level surface, distance of 150 ft, supervsion level. Long term goal 6: Improve 2MWT distance to atleast 120 ft to improve overall fucntion. Long term goal 7: Pt to improve Tinetti balance score to atlest 20/28 to reduce fall risk. Long term goal 8: Pt able to perform curb step with B UE support and/or donny to goup and down 4 to 5 steps with 2 rails at min A   Patient Goals   Patient goals : To get stronger    Plan    Plan  Times per week: 1.5 hr/day, 5 to 7 days/week.   Specific instructions for Next Treatment: transfers, amb, balance, standing program  Current Treatment Recommendations: Strengthening,Balance Training,Functional Mobility Training,Transfer Training,Endurance

## 2022-04-09 NOTE — PROGRESS NOTES
[address]   ACUTE REHABILITATION OCCUPATIONAL THERAPY  DAILY NOTE    Date: 22  Patient Name: [facility]      Room: 0680/1243-09    MRN: [id number]   : 1950  (71 y.o.)  Gender: female   Referring Practitioner: Theresa Alves MD  Diagnosis: Cervical myelopathy  Additional Pertinent Hx: 71 y.o.  female, with a history of anemia, spondylolisthesis, chronic DVT, chronic diastolic heart failure, CVA, major depressive disorder, s/p cervical spine fusion, stenosis of cervical spine with myelopathy, and DM type II, who presents with leg pain, shortness of breath, fall, and neck pain. According to patient and her , patient has had multiple falls recently including a fall Monday and evening and again on Tuesday. Patient reports that today she felt extremely weak upon waking. Restrictions  Restrictions/Precautions: General Precautions,Fall Risk  Implants present? : Metal implants (cervical and lumbar surgeries, L wrist ORIF)  Other position/activity restrictions: up as tolerated; O2 3L/m via NC  Required Braces or Orthoses?: No  Equipment Used: Other (RW)    Subjective  Subjective: \"I had a really bad night\" Pt reports having coughing and breathing issues overnight. Comments: Pt pleasent and agreeable for OT treatment. Patient Currently in Pain: Yes  Pain Level: 5  Pain Location: Chest  Restrictions/Precautions: General Precautions; Fall Risk  Overall Orientation Status: Within Functional Limits     Pain Assessment  Pain Assessment: 0-10  Pain Level: 5  Pain Type: Acute pain  Pain Location: Chest  Pain Orientation: Mid    Objective     Balance  Standing Balance: Stand by assistance  Transfers  Stand Pivot Transfers: Stand by assistance  Sit to stand: Stand by assistance  Stand to sit: Stand by assistance  Transfer Comments: verbal cues for hand placement and safety  Standing Balance  Time: AM: 1 min X2  Activity: toileting     Fine Motor: Pt instruction in BUE [facility] and instrinsic hand strengthening for ease and safety with ADLs. AM:  engaging in game of Trouble for hand strengthening to press pop socket and roll dice. PM: Pt places pins in slots using L hand and dice game with increased time req for manipulation. ADL  Grooming: Modified independent  (from w/c level)  UE Dressing: Setup (s/u retrieving clothes from closet. Completes from w/c level)  Toileting: Stand by assistance  Additional Comments: Pt completes UB dressing and hair care this AM. Agreeable to showering tomorrow. Assessment  Performance deficits / Impairments: Decreased functional mobility ; Decreased ADL status; Decreased strength;Decreased endurance;Decreased sensation;Decreased balance;Decreased high-level IADLs;Decreased fine motor control;Decreased coordination  Prognosis: Good  Discharge Recommendations: 24 hour supervision or assist;Home with Home health OT  Activity Tolerance: Patient limited by fatigue;Patient Tolerated treatment well  Safety Devices in place: Yes  Type of devices: Left in chair;Call light within reach; Patient at risk for falls          Patient Education: OT POC, safety with functional transfers, modified techniques and energy conservation for ADLs. Patient Goals   Patient goals : \"To be able to put my pants/socks on better\"  Learner:patient  Method: explanation       Outcome: acknowledged understanding of         Plan  Plan  Times per week: 5-7  Times per day: Twice a day  Current Treatment Recommendations: Balance Training,Functional Mobility Training,Endurance Training,Strengthening,ROM,Safety Education & Training,Patient/Caregiver Education & Training,Equipment Evaluation, Education, & procurement,Self-Care / Lorrayne Pack Management  Patient Goals   Patient goals :  \"To be able to put my pants/socks on better\"  Short term goals  Time Frame for Short term goals: 1 week   Short term goal 1: Pt will complete LB dressing/bathing/toileting CGA with Good safety with use of AE/DME/modified techniques for increased IND with self care  Short term goal 2: Pt will participate in 30+ minutes functional activity for increased strength/balance/endurance for increased IND in ADL's  Short term goal 3: Pt will complete transfers/functional mobility CGA with Good safety and RW for increased IND in ADL's  Short term goal 4: Pt will verbalize/demonstrate Good understanding of AE/DME/modified techniques for increased IND in self care  Short term goal 5: Pt will complete UB bathing/dressing/grooming with Supervision for increased IND in self care  Long term goals  Time Frame for Long term goals : by discharge   Long term goal 1: Pt will complete toileting/grooming/dressing Mod I and bathing with Supervison with Good safety with use of AE/DME/modified techniques for increased IND with self care  Long term goal 2: Pt will complete functional mobility/transferes during functional activity Mod I with Good safety and RW for increased IND in ADL's  Long term goal 3: Pt will verbalize/demonstrate Good understanding of fall prevention strategies for increased safety/IND in self care  Long term goal 4: Pt will improve L hand strength for self care as evidence by a 3# improvement in dynomometor testing   Long term goal 5: Pt will improve L FMC as evidence by a 20 second improvement on 9 hole peg test for increased IND with self care        04/09/22 1352 04/09/22 1547   OT Individual Minutes   Time In 1107 1436   Time Out 1204 1509   Minutes 57 33     Electronically signed by PATRICIA Ramos on 4/9/22 at 3:47 PM EDT

## 2022-04-09 NOTE — PROGRESS NOTES
[facility]    CONSULTATION / HISTORY AND PHYSICAL EXAMINATION            Date:   4/9/2022  Patient name:  Tanmay Jerome  Date of admission:  3/23/2022  4:52 PM  MRN:   [id number]  Account:  [de-identified]  YOB: 1950  PCP:    Promise Song MD  Room:   262/2622-01  Code Status:    Full Code    Physician Requesting Consult: Lea Kaba MD    Reason for Consult:  Medical management    Chief Complaint:     No chief complaint on file.   Debility    History Obtained From:     Patient, EMR, nursing staff    History of Present Illness:     71-year-old female initially presented to the multiple falls over several weeks in the setting of chronic cervical spine stenosis with myelopathy status post cervical fusion follows with neurosurgery  Recently started on Eliquis for acute DVT right leg  Trauma work-up CT brain in this admission unremarkable other than multiple remote and healing rib fractures, patient uses walker at home  Neurology review was obtained for worsening gait instability-MRI thoracic spine did show T2-T3 and T5-T7 moderate neural foraminal narrowing  Also patient was noted to have bilateral leg swelling with some stasis dermatitis, early cellulitis-started on diuresis as well as antibiotics  3/26   Patient feeling better  Feels that anxiety is better after starting buspar   Working with PT  3/28 ]  Patient complaining of cough, wheezing  She told the nurse that, cough is going on since she started lisinopril  Had wheezing, which improved with nebulization  3/29   Patient complaining of cough, shortness of breath, wheezing  Chest x-ray seen,  Had EKG, troponins which are flat  Past Medical History:     Past Medical History:   Diagnosis Date    Allergic rhinitis, cause unspecified     Back pain     lumbar    Bowel obstruction (HCC)     history of due to scar tissue, resolved non-surgically    C. difficile diarrhea     CAD (coronary artery disease)     no stent needed per pt.  Dr. Lorrie Jolley did cath at [facility]    Cardiac murmur     Cellulitis     left leg    Cellulitis 2017 August    leg left leg/bug bite    Cerebral artery occlusion with cerebral infarction [facility])     TIA 2014    COVID-19     ONE YR AGO IN 4/25/2020 fever and cough    Diverticulosis of colon (without mention of hemorrhage)     GERD (gastroesophageal reflux disease)     GERD (gastroesophageal reflux disease)     on rx    History of blood transfusion     approx 2020        History of CHF (congestive heart failure)     History of MI (myocardial infarction) 2005    thought due to a blood clot    History of ovarian cyst 1970    had oopherectomy holly    History of peritonitis 1968    due to ruptured appendix age 13    HTN (hypertension)     Hx of blood clots     right leg    Hyperlipidemia     Intestinal or peritoneal adhesions with obstruction (postoperative) (postinfection) ([address]     Kidney infection     renal failure/sepsis/spider bite    Lateral epicondylitis  of elbow     MDRO (multiple drug resistant organisms) resistance     c diff    Muscle strain     right posterior shoulder    Other abnormal glucose     PONV (postoperative nausea and vomiting)     dry heaves    Pre-diabetes     Restless legs syndrome (RLS)     Snores     no cpap    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)     TIA (transient ischemic attack) 2014    Uses walker     Vitamin D deficiency     Wears glasses     Wellness examination     last seen 2 weeks ago        Past Surgical History:     Past Surgical History:   Procedure Laterality Date    ABDOMEN SURGERY  1976    benign tumor removed near remaining ovary, 1.5 pounds    APPENDECTOMY  1968    appendix ruptured, developed peritonitis    BACK SURGERY      BUNIONECTOMY Left     along with calcium deposits removed   [address]  2005    negative    CERVICAL FUSION  05/21/2021 POSTERIOR C3-6 LAMINECTOMY, PARTIAL C7 LAMINECTOMY, FUSION C3-C6, SILVERCORD    CERVICAL FUSION N/A 5/21/2021    POSTERIOR C3-6 LAMINECTOMY, PARTIAL C7 LAMINECTOMY, FUSION C3-C6, SILVERCORD performed by Jose Alfredo Justin DO at [address]    12 INCHES REMOVED D/T OBSTRUCTION    COLONOSCOPY      CYST REMOVAL Right     right facial    HYSTERECTOMY  1973    taken as a result of recurring cysts    LUMBAR FUSION N/A 02/10/2020    LUMBAR L4-5 POSTERIOR  DECOMPRESSION INSTRUMENTATION FUSION WCEMENT AUGMENTATION/ performed by Jhoan Dotson MD at [facility] N/A 06/17/2020    L5-S1 PLIF L4-L5 REVISION performed by Jhoan Dotson MD at [facility]  08/14/2014    FESS    OVARY REMOVAL  1970    UNILATERAL due to cyst    OVARY REMOVAL  1971    partial, due to cyst    SINUS SURGERY  2004    UPPER GASTROINTESTINAL ENDOSCOPY N/A 05/31/2019    EGD ESOPHAGOGASTRODUODENOSCOPY performed by Marjorie Chavez MD at [address] [facility] N/A 08/05/2019    EGD BIOPSY performed by Andie Driver MD at [address] [facility] N/A 08/23/2019    EGD BIOPSY performed by Marjorie Chavez MD at [address] [facility] 03/05/2019    WRIST OPEN REDUCTION INTERNAL FIXATION performed by Yimi Jordan MD at [address]        Medications Prior to Admission:     Prior to Admission medications    Medication Sig Start Date End Date Taking? Authorizing Provider   traMADol (ULTRAM) 50 MG tablet Take 1 tablet by mouth every 12 hours as needed (severe pain) for up to 7 days.  4/7/22 4/14/22 Yes Danica Ngo MD   acetaminophen (TYLENOL) 325 MG tablet Take 2 tablets by mouth every 4 hours as needed for Pain 4/7/22  Yes Danica Ngo MD   busPIRone (BUSPAR) 5 MG tablet Take 1 tablet by mouth 3 times daily 4/7/22  Yes Danica Ngo MD   albuterol sulfate HFA (VENTOLIN HFA) 108 (90 Base) MCG/ACT inhaler Inhale 2 puffs into the lungs 4 times daily as needed for Wheezing or Shortness of Breath 4/7/22  Yes Duran Mason MD   apixaban (ELIQUIS) 5 MG TABS tablet Take 1 tablet by mouth 2 times daily 4/7/22  Yes Duran Mason MD   gabapentin (NEURONTIN) 100 MG capsule Take 2 capsules by mouth 2 times daily for 30 days. 4/7/22 5/7/22 Yes Duran Mason MD   citalopram (CELEXA) 20 MG tablet Take 1 tablet by mouth daily 4/7/22  Yes Duran Mason MD   atorvastatin (LIPITOR) 40 MG tablet Take 1 tablet by mouth nightly 4/7/22  Yes Duran Mason MD   fenofibrate micronized (LOFIBRA) 134 MG capsule Take 1 capsule by mouth every morning (before breakfast) 4/7/22  Yes Duran Mason MD   losartan (COZAAR) 100 MG tablet Take 1 tablet by mouth daily 4/8/22  Yes Duran Mason MD   pramipexole (MIRAPEX) 0.5 MG tablet Take 1 tablet by mouth nightly 4/7/22  Yes Duran Mason MD   carvedilol (COREG) 12.5 MG tablet Take 1 tablet by mouth 2 times daily 4/7/22  Yes Durna Mason MD   amLODIPine (NORVASC) 5 MG tablet Take 1 tablet by mouth daily 4/7/22  Yes Duran Mason MD   lidocaine 4 % external patch Place 1 patch onto the skin daily as needed (shoulder pain) Apply patch to shoulder. Do not place over chest/sternum. Patch may remain in place for up to 12 hours in any 24 hour period.  4/7/22  Yes Duran Mason MD   furosemide (LASIX) 20 MG tablet Take 1 tablet by mouth 2 times daily 4/7/22  Yes Duran Mason MD   melatonin 3 MG TABS tablet Take 2 tablets by mouth nightly as needed (insomnia) 4/7/22  Yes Duran Mason MD   nitroGLYCERIN (NITROSTAT) 0.4 MG SL tablet Place 1 tablet under the tongue every 5 minutes as needed for Chest pain 9/1/21   Dai Etienne MD   docusate sodium (COLACE) 100 MG capsule Take 1 capsule by mouth 2 times daily as needed for Constipation 5/30/21   Matt Marley DO   cyanocobalamin ([employer] VITAMIN B12) 1000 MCG tablet Take 1 tablet by mouth daily 12/3/20   Wood Frank Padron MD   ferrous sulfate (IRON 325) 325 (65 Fe) MG tablet Take 1 tablet by mouth 2 times daily 7/2/20   Pascale Felton MD   VITAMIN D, ERGOCALCIFEROL, PO Take by mouth    Historical Provider, MD   Lancets MISC 1 each by Does not apply route daily 10/10/19   Elaine Gama MD   blood glucose monitor strips Test 2 times a day & as needed for symptoms of irregular blood glucose. 10/10/19   Elaine Gama MD   Calcium Carbonate-Vitamin D (CALCIUM 500/D) 500-125 MG-UNIT TABS Take 1 tablet by mouth daily     Historical Provider, MD        Allergies:     Bactrim [sulfamethoxazole-trimethoprim], Codeine, and Seasonal    Social History:     Tobacco:    reports that she quit smoking about 4 years ago. Her smoking use included cigarettes. She started smoking about 26 years ago. She has a 10.00 pack-year smoking history. She has never used smokeless tobacco.  Alcohol:      reports no history of alcohol use. Drug Use:  reports no history of drug use. Family History:     Family History   Problem Relation Age of Onset    Stroke Mother     Diabetes Mother     Heart Disease Mother     High Blood Pressure Mother     Heart Disease Father     Heart Disease Brother     High Blood Pressure Brother     Heart Disease Maternal Grandmother     High Blood Pressure Sister        Review of Systems:     Positive and Negative as described in HPI. CONSTITUTIONAL:  negative for fevers, chills, sweats, fatigue, weight loss  HEENT:  negative for vision, hearing changes, runny nose, throat pain  RESPIRATORY: Positive for cough, shortness of breath, wheezing  CARDIOVASCULAR: Positive for chest, worse with cough.   GASTROINTESTINAL:  negative for nausea, vomiting, diarrhea, constipation, change in bowel habits, abdominal pain   GENITOURINARY:  negative for difficulty of urination, burning with urination, frequency   INTEGUMENT:  negative for rash, skin lesions, easy bruising   HEMATOLOGIC/LYMPHATIC:  negative for swelling/edema ALLERGIC/IMMUNOLOGIC:  negative for urticaria , itching  ENDOCRINE:  negative increase in drinking, increase in urination, hot or cold intolerance  MUSCULOSKELETAL:  negative joint pains, muscle aches, swelling of joints  NEUROLOGICAL:  negative for headaches, dizziness, lightheadedness, numbness, pain, tingling extremities      Physical Exam:     BP (!) 116/57   Pulse 73   Temp 98.6 °F (37 °C) (Oral)   Resp 16   Ht 5' 3\" (1.6 m)   Wt 181 lb (82.1 kg)   SpO2 93%   BMI 32.06 kg/m²   Temp (24hrs), Av.5 °F (36.9 °C), Min:98.4 °F (36.9 °C), Max:98.6 °F (37 °C)    No results for input(s): POCGLU in the last 72 hours. No intake or output data in the 24 hours ending 22 1908    General Appearance:  alert, well appearing, and in no acute distress  Head:  normocephalic, atraumatic. Eye: no icterus, redness, pupils equal and reactive, extraocular eye movements intact, conjunctiva clear  Ear: normal external ear, no discharge, hearing intact  Nose:  no drainage noted  Mouth: mucous membranes moist  Neck: supple, no carotid bruits, thyroid not palpable  Lungs: Air entry but decreased, better wheezing present  Cardiovascular: normal rate, regular rhythm, no murmur, gallop, rub.   Abdomen: Soft, nontender, nondistended, normal bowel sounds, no hepatomegaly or splenomegaly  Neurologic: There are no new focal motor or sensory deficits, normal muscle tone and bulk, no abnormal sensation, normal speech, cranial nerves II through XII grossly intact  Skin: No gross lesions, rashes, bruising or bleeding on exposed skin area  Extremities:  peripheral pulses palpable, no pedal edema or calf pain with palpation  Psych: normal affect    Investigations:      Laboratory Testing:  Recent Results (from the past 24 hour(s))   Basic Metabolic Panel    Collection Time: 22  6:54 AM   Result Value Ref Range    Glucose 170 (H) 70 - 99 mg/dL    BUN 55 (H) 8 - 23 mg/dL    CREATININE 1.27 (H) 0.50 - 0.90 mg/dL    Calcium 9.4 8.6 - 10.4 mg/dL    Sodium 136 135 - 144 mmol/L    Potassium 4.3 3.7 - 5.3 mmol/L    Chloride 101 98 - 107 mmol/L    CO2 22 20 - 31 mmol/L    Anion Gap 13 9 - 17 mmol/L    GFR Non-African American 41 (L) >60 mL/min    GFR African American 50 (L) >60 mL/min    GFR Comment             Imaging/Diagonstics:  Recent data reviewed    Assessment :      Primary Problem  Cervical myelopathy [facility])    Active Hospital Problems    Diagnosis Date Noted    Cervical myelopathy ([facility] [address] [G95.9] 03/17/2022       Plan:     Multiple falls, gait instability, multiple remote and healing rib fractures leading to chest pain-needs PT OT  Neural foraminal narrowing of thoracic spine-neurology as reviewed-recommend rehab and physical therapy  Hypertension-continue amlodipine, Coreg  Right leg DVT-continue Eliquis  Chronic diastolic CHF-currently compensated-continue Coreg, Lipitor, Lasix, lisinopril  Depression -patient noted to be very tearful today-is already on maximal dose of  Celexa, will add BuSpar    DVT prophylaxis-patient on Eliquis    3/27   But has readings of low blood pressure, asymptomatic  Reducing dose of Norvasc to 5 from 10    3/28   Patient has chronic cough, started since she is put on ACE inhibitor  May have ACE inhibitor induced cough, will discontinue lisinopril, start patient on Cozaar  Started patient given nebulization  Has history of smoking  Chest x-ray seen   3/29   Suspecting symptoms are due to acute bronchitis,  Ordering Z-Han, prednisone  EKG seen, troponins are flat  Chest x-ray seen  We will monitor  4/3  Patient shortness of breath is getting better being on steroids  Oxygen requirement going down   We will try to wean oxygen, discussed with RN  Completed antibiotics and steroid    4/4  SOB, little better   On NC o2, 2L  Wean off o2  PT/OT   Labs and vitals reviewed       4/5  Multiple falls, gait instability,  Working with physical therapy,  Improving,  Chronic respiratory failure, on 2 L of oxygen, wean off oxygen,  Morbid obesity, low-calorie diet,  PT OT,  Labs and vitals reviewed,  Medications and their side effects reviewed    4/6  Multiple falls, gait instability,  Chronic respiratory failure with 2 to 3 L of oxygen, wean off oxygen,  Morbid obesity, low-calorie diet,  Labs, radiology, vitals, medications reviewed,  Continue physical therapy,    4/7  Thoracic spine narrowing , LE weakness,   Multiple falls, gait instability,  Cr worsening 1.3->1.68, will hold lasix, losartan, recheck BMP next am,   May start fluids if cr didn't get better   Echo reviewed from 2018,   EF 93%, grade 1 Diastolic dyfn      Chronic respiratory failure with 2 to 3 L of oxygen, wean off oxygen,  Morbid obesity, low-calorie diet,  Labs, radiology, vitals, medications reviewed,  Continue physical therapy,      Shortness of breath and wheezing,  Chest x-ray stat,  History of for diastolic heart failure in the past, last echo was done in 2018, showed ejection fraction 65% with grade 1 diastolic dysfunction,  Creatinine was 1.68, today 1.56, improved a little,  Blood pressure high, restarted losartan,  Ct chest ordered and rev no chf no pneumonia  Oral prednisone for copd     Consultations:     IP CONSULT TO DIETITIAN  IP CONSULT TO SOCIAL WORK  IP CONSULT TO INTERNAL MEDICINE      Penny Sanchez MD  4/9/2022  7:08 PM    Copy sent to Dr. Mae De La Garza MD    Please note that this chart was generated using voice recognition Dragon dictation software. Although every effort was made to ensure the accuracy of this automated transcription, some errors in transcription may have occurred.

## 2022-04-09 NOTE — PROGRESS NOTES
Physical Medicine & Rehabilitation  Progress Note      Subjective:      Cyn Hutchison is a 71 y.o. female with cervical myelopathy. She reports feeling okay today but states that she felt poorly last night. She notes continued dry cough. IM ordered CT chest.  Discussed restarting prednisone with IM. Patient denies any other acute concerns. Discharge remains on hold. Creatinine improved today. ROS:  Denies fevers, chills, sweats. No palpitations, lightheadedness. +coughing, wheezing  Denies abdominal pain, nausea, diarrhea or constipation. No new areas of joint pain. Denies new areas of numbness or weakness. Denies new anxiety or depression issues. No new skin problems. Rehabilitation:   Progressing in therapies. PT:  Restrictions/Precautions: General Precautions,Fall Risk  Implants present? : Metal implants (cervical and lumbar surgeries, L wrist ORIF)  Other position/activity restrictions: up as tolerated; O2 3L/m via NC   Transfers  Sit to Stand: Contact guard assistance (w/ RW + VC's for technique)  Stand to sit: Contact guard assistance (w/ RW + VC's for technique)  Bed to Chair: Contact guard assistance,Minimal assistance (w/ RW + VC's for technique)  Stand Pivot Transfers: Contact guard assistance,Minimal Assistance (w/ RW + VC's for technique)  Comment: Hx of pushing backwards and relying on back of legs for support against sitting surface. Does much better when cued to scoot forward to edge of chair/bed, position her feet, and to bring her \"nose over her toes\"  Ambulation 1  Surface: level tile  Device: Rolling Walker  Other Apparatus: Wheelchair follow  Assistance: Contact guard assistance,Minimal assistance (Marilyn to bring RW closer to the pt)  Quality of Gait: Slow tawanda, Lack on hip and knee flexion on LLE, minimizing step height. Cues to  LLE more and promote Heel-toe strike, with poor carryover.   Gait Deviations: Slow Tawanda,Decreased step length,Decreased step height  Distance: 50' , 21' , 21'  Comments: Short seated rest breaks between distances    Transfers  Sit to Stand: Contact guard assistance (w/ RW + VC's for technique)  Stand to sit: Contact guard assistance (w/ RW + VC's for technique)  Bed to Chair: Contact guard assistance,Minimal assistance (w/ RW + VC's for technique)  Stand Pivot Transfers: Contact guard assistance,Minimal Assistance (w/ RW + VC's for technique)  Comment: Hx of pushing backwards and relying on back of legs for support against sitting surface. Does much better when cued to scoot forward to edge of chair/bed, position her feet, and to bring her \"nose over her toes\"  Ambulation  Ambulation?: Yes  More Ambulation?: Yes  Ambulation 1  Surface: level tile  Device: Rolling Walker  Other Apparatus: Wheelchair follow  Assistance: Contact guard assistance,Minimal assistance (Marilyn to bring RW closer to the pt)  Quality of Gait: Slow tawanda, Lack on hip and knee flexion on LLE, minimizing step height. Cues to  LLE more and promote Heel-toe strike, with poor carryover. Gait Deviations: Slow Tawanda,Decreased step length,Decreased step height  Distance: 50' , 20' , 21'  Comments: Short seated rest breaks between distances    Surface: level tile  Ambulation 1  Surface: level tile  Device: Rolling Walker  Other Apparatus: Wheelchair follow  Assistance: Contact guard assistance,Minimal assistance (Marilyn to bring RW closer to the pt)  Quality of Gait: Slow tawanda, Lack on hip and knee flexion on LLE, minimizing step height. Cues to  LLE more and promote Heel-toe strike, with poor carryover.   Gait Deviations: Slow Tawanda,Decreased step length,Decreased step height  Distance: 50' , 20' , 21'  Comments: Short seated rest breaks between distances    OT:  ADL  Equipment Provided: Reacher,Long-handled sponge  Feeding: Modified independent  (Per pt report)  Grooming: Modified independent  (from w/c level)  UE Bathing: Stand by assistance (sitting in tub bench)  LE Bathing: Stand by assistance  UE Dressing: Setup (s/u retrieving clothes from closet. Completes from w/c level)  LE Dressing: Stand by assistance  Toileting: Stand by assistance  Additional Comments: Pt completes UB dressing and hair care this AM. Agreeable to showering tomorrow. Instrumental ADL's  Instrumental ADLs: Yes     Balance  Sitting Balance: Modified independent   Standing Balance: Stand by assistance   Standing Balance  Time: AM: 1 min X2  Activity: toileting  Comment: with RW  Functional Mobility  Functional - Mobility Device: Rolling Walker  Activity: Retrieve items,Transport items,To/from bathroom  Assist Level: Stand by assistance  Functional Mobility Comments: with minimal verba cues for safety     Bed mobility  Rolling to Left: Stand by assistance  Rolling to Right: Stand by assistance  Supine to Sit: Stand by assistance  Sit to Supine: Stand by assistance  Scooting: Contact guard assistance  Comment: bed mobility on mat table  Transfers  Stand Pivot Transfers: Stand by assistance  Sit to stand: Stand by assistance  Stand to sit: Stand by assistance  Transfer Comments: verbal cues for hand placement and safety   Toilet Transfers  Toilet - Technique: Ambulating  Equipment Used: Grab bars  Toilet Transfer: Stand by assistance  Toilet Transfers Comments: with RW     Shower Transfers  Shower - Transfer From: Walker  Shower - Transfer Type: To and From  Shower - Transfer To: Transfer tub bench  Shower - Technique: Ambulating  Shower Transfers: Stand by assistance  Shower Transfers Comments: Verbal cues for hand placement and safety over ramp  Wheelchair Bed Transfers  Wheelchair/Bed - Technique: Stand pivot  Equipment Used: Other (RW)  Level of Asssistance:  Moderate assistance  Wheelchair Transfers Comments: with RW; Blocking of L foot due to slipping    SPEECH:      Current Medications:   Current Facility-Administered Medications: predniSONE (DELTASONE) tablet 40 mg, 40 mg, Oral, Daily  benzonatate (TESSALON) capsule 100 mg, 100 mg, Oral, TID PRN  albuterol (PROVENTIL) nebulizer solution 2.5 mg, 2.5 mg, Nebulization, As Directed RT PRN  perflutren lipid microspheres (DEFINITY) injection 1.65 mg, 1.5 mL, IntraVENous, ONCE PRN  Benzocaine-Menthol (CEPACOL SORE THROAT) 15-2.6 MG lozenge 1 lozenge, 1 lozenge, Oral, Q2H PRN  dextromethorphan-guaiFENesin (ROBITUSSIN-DM)  MG/5ML liquid 10 mL, 10 mL, Oral, Q4H PRN  ipratropium-albuterol (DUONEB) nebulizer solution 1 ampule, 1 ampule, Inhalation, 4x daily  hydrOXYzine (ATARAX) tablet 10 mg, 10 mg, Oral, TID PRN  sodium chloride (OCEAN, BABY AYR) 0.65 % nasal spray 1 spray, 1 spray, Each Nostril, PRN  losartan (COZAAR) tablet 100 mg, 100 mg, Oral, Daily  amLODIPine (NORVASC) tablet 5 mg, 5 mg, Oral, Daily  traMADol (ULTRAM) tablet 50 mg, 50 mg, Oral, Q6H PRN  gabapentin (NEURONTIN) capsule 200 mg, 200 mg, Oral, BID  melatonin tablet 6 mg, 6 mg, Oral, Nightly PRN  lidocaine 4 % external patch 1 patch, 1 patch, TransDERmal, Daily  busPIRone (BUSPAR) tablet 5 mg, 5 mg, Oral, TID  magnesium hydroxide (MILK OF MAGNESIA) 400 MG/5ML suspension 30 mL, 30 mL, Oral, Daily PRN  apixaban (ELIQUIS) tablet 5 mg, 5 mg, Oral, BID  atorvastatin (LIPITOR) tablet 40 mg, 40 mg, Oral, Nightly  calcium carbonate w/vitamin D (CALTRATE) 600-400 MG-UNIT per tab 1 tablet, 1 tablet, Oral, Daily  carvedilol (COREG) tablet 12.5 mg, 12.5 mg, Oral, BID  citalopram (CELEXA) tablet 20 mg, 20 mg, Oral, Daily  docusate sodium (COLACE) capsule 100 mg, 100 mg, Oral, BID PRN  fenofibrate (TRIGLIDE) tablet 160 mg, 160 mg, Oral, Daily  ferrous sulfate (IRON 325) tablet 325 mg, 325 mg, Oral, BID  [Held by provider] furosemide (LASIX) tablet 20 mg, 20 mg, Oral, BID  pramipexole (MIRAPEX) tablet 0.5 mg, 0.5 mg, Oral, Nightly  vitamin B-12 (CYANOCOBALAMIN) tablet 1,000 mcg, 1,000 mcg, Oral, Daily  acetaminophen (TYLENOL) tablet 650 mg, 650 mg, Oral, Q4H PRN  polyethylene glycol (GLYCOLAX) input(s): AST, ALT, ALB, BILIDIR, BILITOT, ALKPHOS in the last 72 hours. CT chest, 4/9/22:  Impression   Negative noncontrast CT examination of the chest with no evidence of   pneumonia or edema/CHF.  Multiple incidental/chronic findings as detailed   above including cholelithiasis. Impression/Plan:   Impaired ADLs, gait, and mobility due to:    Cervical myelopathy:  S/p previous cervical decompression. With ataxia. PT/OT  for gait, mobility, strengthening, endurance, ADLs, and self care. Pain control with gabapentin, as-needed tylenol, as-needed tramadol. Elevated creatinine:  Creatinine 1.27 on 4/9, improving. IM holding lasix, restarted losartan. Will recheck in the morning. Suspected costochondritis:  Pain control with as-needed tramadol, as-needed tylenol. Bronchitis:  Completed course of steroids and zithromax with improvement. Now has continued cough and increased wheezing. CT chest showed no pneumonia or edema. On scheduled duo-neb. Has robitussin as needed - changed to robitussin-DM on 4/8. Added cepacol lozenges as needed on 4/8. Gave one-time dose of prednisone on 4/8, added 4 more doses on 4/9 after discussion with IM. Anemia:  Hemoglobin 11.7 on 4/7, stable to improved. Monitoring. On oral iron supplementation. Lower limb cellulitis:  Resolved. Completed course of keflex. CAD:  On atorvastatin, fenofibrate  Chronic diastolic heart failure: On lasix (currently on hold)  HTN:  On amlodipine, carvedilol, losartan  Chronic DVT:  On eliquis  Depression: On celexa. IM added buspirone. Patient previously declined psych evaluation. Restless leg syndrome: On pramipexole  Nasal congestion, dryness: Added saline nasal spray as needed on 4/4.   Bowel Management: Miralax daily, senokot prn, dulcolax prn, docusate BID prn, milk of magnesia prn  DVT prophylaxis:  On eliquis  Internal Medicine for medical management      Electronically signed by Renetta Moody MD on 4/9/2022 at 11:12 PM

## 2022-04-09 NOTE — PROGRESS NOTES
[facility] Internal Medicine    CONSULTATION / HISTORY AND PHYSICAL EXAMINATION            Date:   4/8/2022  Patient name:  Dakota Escamilla  Date of admission:  3/23/2022  4:52 PM  MRN:   [id number]  Account:  [de-identified]  YOB: 1950  PCP:    Robbie Stone MD  Room:   Count includes the [facility]262-  Code Status:    Full Code    Physician Requesting Consult: Nighat Elmore MD    Reason for Consult:  Medical management    Chief Complaint:     No chief complaint on file.   Debility    History Obtained From:     Patient, EMR, nursing staff    History of Present Illness:     61-year-old female initially presented to the multiple falls over several weeks in the setting of chronic cervical spine stenosis with myelopathy status post cervical fusion follows with neurosurgery  Recently started on Eliquis for acute DVT right leg  Trauma work-up CT brain in this admission unremarkable other than multiple remote and healing rib fractures, patient uses walker at home  Neurology review was obtained for worsening gait instability-MRI thoracic spine did show T2-T3 and T5-T7 moderate neural foraminal narrowing  Also patient was noted to have bilateral leg swelling with some stasis dermatitis, early cellulitis-started on diuresis as well as antibiotics  3/26   Patient feeling better  Feels that anxiety is better after starting buspar   Working with PT  3/28 ]  Patient complaining of cough, wheezing  She told the nurse that, cough is going on since she started lisinopril  Had wheezing, which improved with nebulization  3/29   Patient complaining of cough, shortness of breath, wheezing  Chest x-ray seen,  Had EKG, troponins which are flat  Past Medical History:     Past Medical History:   Diagnosis Date    Allergic rhinitis, cause unspecified     Back pain     lumbar    Bowel obstruction (HCC)     history of due to scar tissue, resolved non-surgically    C. difficile diarrhea     CAD (coronary artery disease)     no stent needed per pt.  Dr. Stephenie Shultz did cath at [facility]    Cardiac murmur     Cellulitis     left leg    Cellulitis 2017 August    leg left leg/bug bite    Cerebral artery occlusion with cerebral infarction [facility])     TIA 2014    COVID-19     ONE YR AGO IN 4/25/2020 fever and cough    Diverticulosis of colon (without mention of hemorrhage)     GERD (gastroesophageal reflux disease)     GERD (gastroesophageal reflux disease)     on rx    History of blood transfusion     approx 2020        History of CHF (congestive heart failure)     History of MI (myocardial infarction) 2005    thought due to a blood clot    History of ovarian cyst 1970    had oopherectomy holly    History of peritonitis 1968    due to ruptured appendix age 13    HTN (hypertension)     Hx of blood clots     right leg    Hyperlipidemia     Intestinal or peritoneal adhesions with obstruction (postoperative) (postinfection) ([address]     Kidney infection     renal failure/sepsis/spider bite    Lateral epicondylitis  of elbow     MDRO (multiple drug resistant organisms) resistance     c diff    Muscle strain     right posterior shoulder    Other abnormal glucose     PONV (postoperative nausea and vomiting)     dry heaves    Pre-diabetes     Restless legs syndrome (RLS)     Snores     no cpap    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)     TIA (transient ischemic attack) 2014    Uses walker     Vitamin D deficiency     Wears glasses     Wellness examination     last seen 2 weeks ago        Past Surgical History:     Past Surgical History:   Procedure Laterality Date    ABDOMEN SURGERY  1976    benign tumor removed near remaining ovary, 1.5 pounds    APPENDECTOMY  1968    appendix ruptured, developed peritonitis    BACK SURGERY      BUNIONECTOMY Left     along with calcium deposits removed   [address]  2005    negative    CERVICAL FUSION  05/21/2021 POSTERIOR C3-6 LAMINECTOMY, PARTIAL C7 LAMINECTOMY, FUSION C3-C6, SILVERCORD    CERVICAL FUSION N/A 5/21/2021    POSTERIOR C3-6 LAMINECTOMY, PARTIAL C7 LAMINECTOMY, FUSION C3-C6, SILVERCORD performed by Brock Benito DO at [address]    12 INCHES REMOVED D/T OBSTRUCTION    COLONOSCOPY      CYST REMOVAL Right     right facial    HYSTERECTOMY  1973    taken as a result of recurring cysts    LUMBAR FUSION N/A 02/10/2020    LUMBAR L4-5 POSTERIOR  DECOMPRESSION INSTRUMENTATION FUSION WCEMENT AUGMENTATION/ performed by Clifton Werner MD at [facility] N/A 06/17/2020    L5-S1 PLIF L4-L5 REVISION performed by Clifton Werner MD at [address] [facility]  08/14/2014    FESS    OVARY REMOVAL  1970    UNILATERAL due to cyst    OVARY REMOVAL  1971    partial, due to cyst    SINUS SURGERY  2004    UPPER GASTROINTESTINAL ENDOSCOPY N/A 05/31/2019    EGD ESOPHAGOGASTRODUODENOSCOPY performed by Rik Sandoval MD at [address] [facility] N/A 08/05/2019    EGD BIOPSY performed by Angelo Arthur MD at [address] [facility] N/A 08/23/2019    EGD BIOPSY performed by Rik Sandoval MD at [address] [facility] Left 03/05/2019    WRIST OPEN REDUCTION INTERNAL FIXATION performed by Martín Haro MD at [address]        Medications Prior to Admission:     Prior to Admission medications    Medication Sig Start Date End Date Taking? Authorizing Provider   traMADol (ULTRAM) 50 MG tablet Take 1 tablet by mouth every 12 hours as needed (severe pain) for up to 7 days.  4/7/22 4/14/22 Yes Duran Mason MD   acetaminophen (TYLENOL) 325 MG tablet Take 2 tablets by mouth every 4 hours as needed for Pain 4/7/22  Yes Duran Mason MD   busPIRone (BUSPAR) 5 MG tablet Take 1 tablet by mouth 3 times daily 4/7/22  Yes Duran Mason MD   albuterol sulfate HFA (VENTOLIN HFA) 108 (90 Base) MCG/ACT inhaler Inhale 2 puffs into the lungs 4 times daily as needed for Wheezing or Shortness of Breath 4/7/22  Yes Stephon Pacheco MD   apixaban (ELIQUIS) 5 MG TABS tablet Take 1 tablet by mouth 2 times daily 4/7/22  Yes Stephon Pacheco MD   gabapentin (NEURONTIN) 100 MG capsule Take 2 capsules by mouth 2 times daily for 30 days. 4/7/22 5/7/22 Yes Stephon Pacheco MD   citalopram (CELEXA) 20 MG tablet Take 1 tablet by mouth daily 4/7/22  Yes Stephon Pacheco MD   atorvastatin (LIPITOR) 40 MG tablet Take 1 tablet by mouth nightly 4/7/22  Yes Stephon Pacheco MD   fenofibrate micronized (LOFIBRA) 134 MG capsule Take 1 capsule by mouth every morning (before breakfast) 4/7/22  Yes Stephon Pacheco MD   losartan (COZAAR) 100 MG tablet Take 1 tablet by mouth daily 4/8/22  Yes Stephon Pacheco MD   pramipexole (MIRAPEX) 0.5 MG tablet Take 1 tablet by mouth nightly 4/7/22  Yes Stephon Pacheco MD   carvedilol (COREG) 12.5 MG tablet Take 1 tablet by mouth 2 times daily 4/7/22  Yes Stephon Pacheco MD   amLODIPine (NORVASC) 5 MG tablet Take 1 tablet by mouth daily 4/7/22  Yes Stephon Pacheco MD   lidocaine 4 % external patch Place 1 patch onto the skin daily as needed (shoulder pain) Apply patch to shoulder. Do not place over chest/sternum. Patch may remain in place for up to 12 hours in any 24 hour period.  4/7/22  Yes Stephon Pacheco MD   furosemide (LASIX) 20 MG tablet Take 1 tablet by mouth 2 times daily 4/7/22  Yes Stephon Pacheco MD   melatonin 3 MG TABS tablet Take 2 tablets by mouth nightly as needed (insomnia) 4/7/22  Yes Stephon Pacheco MD   nitroGLYCERIN (NITROSTAT) 0.4 MG SL tablet Place 1 tablet under the tongue every 5 minutes as needed for Chest pain 9/1/21   Dejuan Snyder MD   docusate sodium (COLACE) 100 MG capsule Take 1 capsule by mouth 2 times daily as needed for Constipation 5/30/21   Rohan Marley,    cyanocobalamin ([facility] VITAMIN B12) 1000 MCG tablet Take 1 tablet by mouth daily 12/3/20   Wood Frank Padron MD   ferrous sulfate (IRON 325) 325 (65 Fe) MG tablet Take 1 tablet by mouth 2 times daily 7/2/20   Pascale Felton MD   VITAMIN D, ERGOCALCIFEROL, PO Take by mouth    Historical Provider, MD   Lancets MISC 1 each by Does not apply route daily 10/10/19   Elaine Gama MD   blood glucose monitor strips Test 2 times a day & as needed for symptoms of irregular blood glucose. 10/10/19   Elaine Gama MD   Calcium Carbonate-Vitamin D (CALCIUM 500/D) 500-125 MG-UNIT TABS Take 1 tablet by mouth daily     Historical Provider, MD        Allergies:     Bactrim [sulfamethoxazole-trimethoprim], Codeine, and Seasonal    Social History:     Tobacco:    reports that she quit smoking about 4 years ago. Her smoking use included cigarettes. She started smoking about 26 years ago. She has a 10.00 pack-year smoking history. She has never used smokeless tobacco.  Alcohol:      reports no history of alcohol use. Drug Use:  reports no history of drug use. Family History:     Family History   Problem Relation Age of Onset    Stroke Mother     Diabetes Mother     Heart Disease Mother     High Blood Pressure Mother     Heart Disease Father     Heart Disease Brother     High Blood Pressure Brother     Heart Disease Maternal Grandmother     High Blood Pressure Sister        Review of Systems:     Positive and Negative as described in HPI. CONSTITUTIONAL:  negative for fevers, chills, sweats, fatigue, weight loss  HEENT:  negative for vision, hearing changes, runny nose, throat pain  RESPIRATORY: Positive for cough, shortness of breath, wheezing  CARDIOVASCULAR: Positive for chest, worse with cough.   GASTROINTESTINAL:  negative for nausea, vomiting, diarrhea, constipation, change in bowel habits, abdominal pain   GENITOURINARY:  negative for difficulty of urination, burning with urination, frequency   INTEGUMENT:  negative for rash, skin lesions, easy bruising   HEMATOLOGIC/LYMPHATIC:  negative for swelling/edema ALLERGIC/IMMUNOLOGIC:  negative for urticaria , itching  ENDOCRINE:  negative increase in drinking, increase in urination, hot or cold intolerance  MUSCULOSKELETAL:  negative joint pains, muscle aches, swelling of joints  NEUROLOGICAL:  negative for headaches, dizziness, lightheadedness, numbness, pain, tingling extremities      Physical Exam:     BP (!) 150/88   Pulse 88   Temp 98.4 °F (36.9 °C) (Oral)   Resp 24   Ht 5' 3\" (1.6 m)   Wt 180 lb 6.4 oz (81.8 kg)   SpO2 91%   BMI 31.96 kg/m²   Temp (24hrs), Av.1 °F (36.7 °C), Min:97.7 °F (36.5 °C), Max:98.4 °F (36.9 °C)    No results for input(s): POCGLU in the last 72 hours. No intake or output data in the 24 hours ending 22    General Appearance:  alert, well appearing, and in no acute distress  Head:  normocephalic, atraumatic. Eye: no icterus, redness, pupils equal and reactive, extraocular eye movements intact, conjunctiva clear  Ear: normal external ear, no discharge, hearing intact  Nose:  no drainage noted  Mouth: mucous membranes moist  Neck: supple, no carotid bruits, thyroid not palpable  Lungs: Air entry but decreased, better wheezing present  Cardiovascular: normal rate, regular rhythm, no murmur, gallop, rub.   Abdomen: Soft, nontender, nondistended, normal bowel sounds, no hepatomegaly or splenomegaly  Neurologic: There are no new focal motor or sensory deficits, normal muscle tone and bulk, no abnormal sensation, normal speech, cranial nerves II through XII grossly intact  Skin: No gross lesions, rashes, bruising or bleeding on exposed skin area  Extremities:  peripheral pulses palpable, no pedal edema or calf pain with palpation  Psych: normal affect    Investigations:      Laboratory Testing:  Recent Results (from the past 24 hour(s))   Basic Metabolic Panel    Collection Time: 22  6:55 AM   Result Value Ref Range    Glucose 110 (H) 70 - 99 mg/dL    BUN 65 (H) 8 - 23 mg/dL    CREATININE 1.56 (H) 0.50 - 0.90 mg/dL Calcium 9.1 8.6 - 10.4 mg/dL    Sodium 141 135 - 144 mmol/L    Potassium 4.2 3.7 - 5.3 mmol/L    Chloride 105 98 - 107 mmol/L    CO2 24 20 - 31 mmol/L    Anion Gap 12 9 - 17 mmol/L    GFR Non-African American 33 (L) >60 mL/min    GFR African American 40 (L) >60 mL/min    GFR Comment             Imaging/Diagonstics:  Recent data reviewed    Assessment :      Primary Problem  Cervical myelopathy [facility])    Active Hospital Problems    Diagnosis Date Noted    Cervical myelopathy ([facility] [address] [G95.9] 03/17/2022       Plan:     Multiple falls, gait instability, multiple remote and healing rib fractures leading to chest pain-needs PT OT  Neural foraminal narrowing of thoracic spine-neurology as reviewed-recommend rehab and physical therapy  Hypertension-continue amlodipine, Coreg  Right leg DVT-continue Eliquis  Chronic diastolic CHF-currently compensated-continue Coreg, Lipitor, Lasix, lisinopril  Depression -patient noted to be very tearful today-is already on maximal dose of  Celexa, will add BuSpar    DVT prophylaxis-patient on Eliquis    3/27   But has readings of low blood pressure, asymptomatic  Reducing dose of Norvasc to 5 from 10    3/28   Patient has chronic cough, started since she is put on ACE inhibitor  May have ACE inhibitor induced cough, will discontinue lisinopril, start patient on Cozaar  Started patient given nebulization  Has history of smoking  Chest x-ray seen   3/29   Suspecting symptoms are due to acute bronchitis,  Ordering Z-Han, prednisone  EKG seen, troponins are flat  Chest x-ray seen  We will monitor  4/3  Patient shortness of breath is getting better being on steroids  Oxygen requirement going down   We will try to wean oxygen, discussed with RN  Completed antibiotics and steroid    4/4  SOB, little better   On NC o2, 2L  Wean off o2  PT/OT   Labs and vitals reviewed       4/5  Multiple falls, gait instability,  Working with physical therapy,  Improving,  Chronic respiratory failure, on 2 L of oxygen, wean off oxygen,  Morbid obesity, low-calorie diet,  PT OT,  Labs and vitals reviewed,  Medications and their side effects reviewed    4/6  Multiple falls, gait instability,  Chronic respiratory failure with 2 to 3 L of oxygen, wean off oxygen,  Morbid obesity, low-calorie diet,  Labs, radiology, vitals, medications reviewed,  Continue physical therapy,    4/7  Thoracic spine narrowing , LE weakness,   Multiple falls, gait instability,  Cr worsening 1.3->1.68, will hold lasix, losartan, recheck BMP next am,   May start fluids if cr didn't get better   Echo reviewed from 2018,   EF 07%, grade 1 Diastolic dyfn      Chronic respiratory failure with 2 to 3 L of oxygen, wean off oxygen,  Morbid obesity, low-calorie diet,  Labs, radiology, vitals, medications reviewed,  Continue physical therapy,      April 8,  Shortness of breath and wheezing,  Chest x-ray stat,  History of for diastolic heart failure in the past, last echo was done in 2018, showed ejection fraction 65% with grade 1 diastolic dysfunction,  Creatinine was 1.68, today 1.56, improved a little,  Blood pressure high, restarted losartan,  Started on IV fluids normal saline at 50, could not get IV, increased p.o. intake,  Lasix 20 mg twice daily was on hold,  Stat echocardiogram was ordered, done and have not read yet,  PT OT,  BMP tomorrow morning if creatinine at the baseline around 1.2-1.3, possibly discharge home on Lasix 20 mg p.o. daily, losartan 100 mg p.o. daily,  BMP in a week    Consultations:     Luli Altamirano MD  4/8/2022  10:38 PM    Copy sent to Dr. Tracy Malik MD    Please note that this chart was generated using voice recognition Dragon dictation software. Although every effort was made to ensure the accuracy of this automated transcription, some errors in transcription may have occurred.

## 2022-04-09 NOTE — PROGRESS NOTES
Physical Medicine & Rehabilitation  Progress Note      Subjective:      Gayle Finnegan is a 71 y.o. female with cervical myelopathy. She reports feeling poorly today due to dry cough. She is also wheezing. Discussed with IM, who ordered CXR and Echo. Creatinine remains elevated. Discharge is currently on hold. She denies any other acute concerns. ROS:  Denies fevers, chills, sweats. +chest pain; No palpitations, lightheadedness. +coughing, wheezing  Denies abdominal pain, nausea, diarrhea or constipation. No new areas of joint pain. Denies new areas of numbness or weakness. Denies new anxiety or depression issues. No new skin problems. Rehabilitation:   Progressing in therapies. PT:  Restrictions/Precautions: General Precautions,Fall Risk  Implants present? : Metal implants (cervical and lumbar surgeries, L wrist ORIF)  Other position/activity restrictions: up as tolerated; O2 3L/m via NC   Transfers  Sit to Stand: Contact guard assistance (w/ RW + VC's for technique)  Stand to sit: Contact guard assistance (w/ RW + VC's for technique)  Bed to Chair: Contact guard assistance,Minimal assistance (w/ RW + VC's for technique)  Stand Pivot Transfers: Contact guard assistance,Minimal Assistance (w/ RW + VC's for technique)  Comment: Hx of pushing backwards and relying on back of legs for support against sitting surface.  Does much better when cued to scoot forward to edge of chair/bed, position her feet, and to bring her \"nose over her toes\"  Ambulation 1  Surface: level tile  Device: Rolling Walker  Other Apparatus: Wheelchair follow  Assistance: Contact guard assistance,Minimal assistance (Marilyn to bring RW closer to the pt)  Quality of Gait: slow tawanda with step to pattern, increase tone in LLE causing decrease knee flexion and flat foot LE at contact with amb, decreased stance on L LE; downward gaze and cervical flexion; slight drift to L side (cues to correct all, with fair but fleeting response from pt)  Gait Deviations: Slow Tawanda,Decreased step length,Decreased step height  Distance: 20ft x4  Comments: 2MWT: 59ft    Transfers  Sit to Stand: Contact guard assistance (w/ RW + VC's for technique)  Stand to sit: Contact guard assistance (w/ RW + VC's for technique)  Bed to Chair: Contact guard assistance,Minimal assistance (w/ RW + VC's for technique)  Stand Pivot Transfers: Contact guard assistance,Minimal Assistance (w/ RW + VC's for technique)  Comment: Hx of pushing backwards and relying on back of legs for support against sitting surface.  Does much better when cued to scoot forward to edge of chair/bed, position her feet, and to bring her \"nose over her toes\"  Ambulation  Ambulation?: Yes  More Ambulation?: Yes  Ambulation 1  Surface: level tile  Device: Rolling Walker  Other Apparatus: Wheelchair follow  Assistance: Contact guard assistance,Minimal assistance (Marilyn to bring RW closer to the pt)  Quality of Gait: slow tawanda with step to pattern, increase tone in LLE causing decrease knee flexion and flat foot LE at contact with amb, decreased stance on L LE; downward gaze and cervical flexion; slight drift to L side (cues to correct all, with fair but fleeting response from pt)  Gait Deviations: Slow Tawanda,Decreased step length,Decreased step height  Distance: 20ft x4  Comments: 2MWT: 59ft    Surface: level tile  Ambulation 1  Surface: level tile  Device: Rolling Walker  Other Apparatus: Wheelchair follow  Assistance: Contact guard assistance,Minimal assistance (Marilyn to bring RW closer to the pt)  Quality of Gait: slow tawanda with step to pattern, increase tone in LLE causing decrease knee flexion and flat foot LE at contact with amb, decreased stance on L LE; downward gaze and cervical flexion; slight drift to L side (cues to correct all, with fair but fleeting response from pt)  Gait Deviations: Slow Tawanda,Decreased step length,Decreased step height  Distance: 20ft x4  Comments: 2MWT: 59ft    OT:  ADL  Equipment Provided: Reacher,Long-handled sponge  Feeding: Modified independent  (Per pt report)  Grooming: Stand by assistance (Standing sink side)  UE Bathing: Stand by assistance (sitting in tub bench)  LE Bathing: Stand by assistance  UE Dressing: Stand by assistance (sitting on toilet)  LE Dressing: Stand by assistance  Toileting: Stand by assistance  Additional Comments: OT facilitated pts engagement in full shower on this date with use of tub bench, grab bars, hand held shower head, and LHS. Dycem placed in shower to assist with transfer safety due to L foot slipping. Pt completed dressing tasks while sitting on toilet per home set-up. Increased time with use of reacher to complete tasks. Pt completed grooming tasks while standing at sink side.     Instrumental ADL's  Instrumental ADLs: Yes     Balance  Sitting Balance: Modified independent   Standing Balance: Stand by assistance   Standing Balance  Time: AM: 1-2 mintues x 2; 5+ minutes x 2 PM: 2 minutes x 1  Activity: Functional transfers/mobility, self care tasks  Comment: with RW  Functional Mobility  Functional - Mobility Device: Rolling Walker  Activity: Retrieve items,Transport items,To/from bathroom  Assist Level: Stand by assistance  Functional Mobility Comments: with minimal verba cues for safety     Bed mobility  Rolling to Left: Stand by assistance  Rolling to Right: Stand by assistance  Supine to Sit: Stand by assistance  Sit to Supine: Stand by assistance  Scooting: Contact guard assistance  Comment: bed mobility on mat table  Transfers  Stand Pivot Transfers: Contact guard assistance  Sit to stand: Stand by assistance  Stand to sit: Stand by assistance  Transfer Comments: verbal cues for hand placement and safety   Toilet Transfers  Toilet - Technique: Ambulating  Equipment Used: Grab bars  Toilet Transfer: Stand by assistance  Toilet Transfers Comments: with RW     Shower Transfers  Shower - Transfer From: Mallory Snell - Transfer Type: To and From  Shower - Transfer To: Transfer tub bench  Shower - Technique: Ambulating  Shower Transfers: Stand by assistance  Shower Transfers Comments: Verbal cues for hand placement and safety over ramp  Wheelchair Bed Transfers  Wheelchair/Bed - Technique: Stand pivot  Equipment Used: Other (RW)  Level of Asssistance:  Moderate assistance  Wheelchair Transfers Comments: with RW; Blocking of L foot due to slipping    SPEECH:      Current Medications:   Current Facility-Administered Medications: benzonatate (TESSALON) capsule 100 mg, 100 mg, Oral, TID PRN  albuterol (PROVENTIL) nebulizer solution 2.5 mg, 2.5 mg, Nebulization, As Directed RT PRN  perflutren lipid microspheres (DEFINITY) injection 1.65 mg, 1.5 mL, IntraVENous, ONCE PRN  Benzocaine-Menthol (CEPACOL SORE THROAT) 15-2.6 MG lozenge 1 lozenge, 1 lozenge, Oral, Q2H PRN  dextromethorphan-guaiFENesin (ROBITUSSIN-DM)  MG/5ML liquid 10 mL, 10 mL, Oral, Q4H PRN  [START ON 4/9/2022] ipratropium-albuterol (DUONEB) nebulizer solution 1 ampule, 1 ampule, Inhalation, 4x daily  hydrOXYzine (ATARAX) tablet 10 mg, 10 mg, Oral, TID PRN  sodium chloride (OCEAN, BABY AYR) 0.65 % nasal spray 1 spray, 1 spray, Each Nostril, PRN  losartan (COZAAR) tablet 100 mg, 100 mg, Oral, Daily  amLODIPine (NORVASC) tablet 5 mg, 5 mg, Oral, Daily  traMADol (ULTRAM) tablet 50 mg, 50 mg, Oral, Q6H PRN  gabapentin (NEURONTIN) capsule 200 mg, 200 mg, Oral, BID  melatonin tablet 6 mg, 6 mg, Oral, Nightly PRN  lidocaine 4 % external patch 1 patch, 1 patch, TransDERmal, Daily  busPIRone (BUSPAR) tablet 5 mg, 5 mg, Oral, TID  magnesium hydroxide (MILK OF MAGNESIA) 400 MG/5ML suspension 30 mL, 30 mL, Oral, Daily PRN  apixaban (ELIQUIS) tablet 5 mg, 5 mg, Oral, BID  atorvastatin (LIPITOR) tablet 40 mg, 40 mg, Oral, Nightly  calcium carbonate w/vitamin D (CALTRATE) 600-400 MG-UNIT per tab 1 tablet, 1 tablet, Oral, Daily  carvedilol (COREG) tablet 12.5 mg, 12.5 mg, Oral, BID  citalopram (CELEXA) tablet 20 mg, 20 mg, Oral, Daily  docusate sodium (COLACE) capsule 100 mg, 100 mg, Oral, BID PRN  fenofibrate (TRIGLIDE) tablet 160 mg, 160 mg, Oral, Daily  ferrous sulfate (IRON 325) tablet 325 mg, 325 mg, Oral, BID  [Held by provider] furosemide (LASIX) tablet 20 mg, 20 mg, Oral, BID  pramipexole (MIRAPEX) tablet 0.5 mg, 0.5 mg, Oral, Nightly  vitamin B-12 (CYANOCOBALAMIN) tablet 1,000 mcg, 1,000 mcg, Oral, Daily  acetaminophen (TYLENOL) tablet 650 mg, 650 mg, Oral, Q4H PRN  polyethylene glycol (GLYCOLAX) packet 17 g, 17 g, Oral, Daily  senna (SENOKOT) tablet 17.2 mg, 2 tablet, Oral, Daily PRN  bisacodyl (DULCOLAX) suppository 10 mg, 10 mg, Rectal, Daily PRN      Objective:  BP (!) 150/88   Pulse 88   Temp 98.4 °F (36.9 °C) (Oral)   Resp 24   Ht 5' 3\" (1.6 m)   Wt 180 lb 6.4 oz (81.8 kg)   SpO2 91%   BMI 31.96 kg/m²       GEN: Well developed, well nourished, no acute distress  HEENT: NCAT. EOM grossly intact. Hearing grossly intact. Mucous membranes pink and moist.  RESP: Diffuse wheezing present. Mildly increased respiratory effort. Cough present. CV: Regular rate and rhythm. No murmurs, rubs, or gallops. ABD: Soft, non-distended, BS+ and equal.  NEURO: Alert. Speech fluent. MSK:  Muscle tone and bulk are normal bilaterally. Moving bilateral upper limbs with at least antigravity strength. LIMBS: No edema in bilateral lower limbs. SKIN: Warm and dry with good turgor. PSYCH: Mood WNL. Affect WNL. Appropriately interactive. Diagnostics:     CBC:   Recent Labs     04/07/22  0652   WBC 7.6   RBC 3.80*   HGB 11.7*   HCT 35.3*   MCV 92.8   RDW 13.7        BMP:   Recent Labs     04/07/22  0652 04/08/22  0655    141   K 4.8 4.2    105   CO2 25 24   BUN 61* 65*   CREATININE 1.68* 1.56*   GLUCOSE 129* 110*     BNP: No results for input(s): BNP in the last 72 hours. PT/INR: No results for input(s): PROTIME, INR in the last 72 hours.   APTT: No results for input(s): APTT in the last 72 hours. CARDIAC ENZYMES: No results for input(s): CKMB, CKMBINDEX, TROPONINT in the last 72 hours. Invalid input(s): CKTOTAL;3  FASTING LIPID PANEL:  Lab Results   Component Value Date    CHOL 103 05/20/2019    HDL 74 03/12/2021    TRIG 66 05/20/2019     LIVER PROFILE: No results for input(s): AST, ALT, ALB, BILIDIR, BILITOT, ALKPHOS in the last 72 hours. CXR, 4/8/22:  Impression   No acute cardiopulmonary disease. Impression/Plan:   Impaired ADLs, gait, and mobility due to:    Cervical myelopathy:  S/p previous cervical decompression. With ataxia. PT/OT  for gait, mobility, strengthening, endurance, ADLs, and self care. Pain control with gabapentin, as-needed tylenol, as-needed tramadol. Elevated creatinine:  Creatinine 1.56 on 4/8. IM holding lasix, restarted losartan. Will recheck in the morning. Suspected costochondritis:  Pain control with as-needed tramadol, as-needed tylenol. Bronchitis:  Completed course of steroids and zithromax with improvement. Has continued cough and increased wheezing today. On scheduled duo-neb. Has robitussin as needed - changed to robitussin-DM on 4/8. Added cepacol lozenges as needed on 4/8. Anemia:  Hemoglobin 11.7 on 4/7, stable to improved. Monitoring. On oral iron supplementation. Lower limb cellulitis:  Resolved. Completed course of keflex. CAD:  On atorvastatin, fenofibrate  Chronic diastolic heart failure: On lasix (currently on hold)  HTN:  On amlodipine, carvedilol, losartan  Chronic DVT:  On eliquis  Depression: On celexa. IM added buspirone. Patient previously declined psych evaluation. Restless leg syndrome: On pramipexole  Nasal congestion, dryness: Added saline nasal spray as needed on 4/4.   Bowel Management: Miralax daily, senokot prn, dulcolax prn, docusate BID prn, milk of magnesia prn  DVT prophylaxis:  On eliquis  Internal Medicine for medical management      Electronically signed by Barbie Becker Zachariah Mendez MD on 4/8/2022 at 11:37 PM

## 2022-04-09 NOTE — PLAN OF CARE
Problem: Skin Integrity:  Goal: Will show no infection signs and symptoms  Description: Will show no infection signs and symptoms  Outcome: Ongoing  Goal: Absence of new skin breakdown  Description: Absence of new skin breakdown  Outcome: Ongoing  Goal: Risk for impaired skin integrity will decrease  Description: Risk for impaired skin integrity will decrease  Outcome: Ongoing     Problem: Mobility - Impaired:  Goal: Mobility will improve  Description: Mobility will improve  Outcome: Ongoing     Problem: Nutrition  Goal: Optimal nutrition therapy  4/9/2022 0214 by Anitra Giles RN  Outcome: Ongoing  4/8/2022 1740 by Louie Contreras RD, LD  Outcome: Ongoing     Problem: NUTRITION  Goal: Patient maintains adequate hydration  Outcome: Ongoing  Goal: Patient maintains weight  Outcome: Ongoing  Goal: Patient/Family demonstrates understanding of diet  Outcome: Ongoing  Goal: Patient/Family independently completes tube feeding  Outcome: Ongoing  Goal: Patient will have no more than 5 lb weight change during LOS  Outcome: Ongoing  Goal: Patient will utilize adaptive techniques to administer nutrition  Outcome: Ongoing  Goal: Patient will verbalize dietary restrictions  Outcome: Ongoing     Problem:  Activity:  Goal: Risk for activity intolerance will decrease  Description: Risk for activity intolerance will decrease  Outcome: Ongoing     Problem: Fluid Volume:  Goal: Ability to maintain a balanced intake and output will improve  Description: Ability to maintain a balanced intake and output will improve  Outcome: Ongoing     Problem: Nutritional:  Goal: Maintenance of adequate nutrition will improve  Description: Maintenance of adequate nutrition will improve  Outcome: Ongoing  Goal: Progress toward achieving an optimal weight will improve  Description: Progress toward achieving an optimal weight will improve  Outcome: Ongoing     Problem: Physical Regulation:  Goal: Complications related to the disease process, condition or treatment will be avoided or minimized  Description: Complications related to the disease process, condition or treatment will be avoided or minimized  Outcome: Ongoing  Goal: Diagnostic test results will improve  Description: Diagnostic test results will improve  Outcome: Ongoing

## 2022-04-09 NOTE — PLAN OF CARE
Problem: Skin Integrity:  Goal: Absence of new skin breakdown  Description: Absence of new skin breakdown  Note: There are no new skin issues so far this shift. Will continue to assist patient with repositioning at least every two hours.

## 2022-04-10 LAB
ANION GAP SERPL CALCULATED.3IONS-SCNC: 10 MMOL/L (ref 9–17)
BUN BLDV-MCNC: 43 MG/DL (ref 8–23)
CALCIUM SERPL-MCNC: 9.3 MG/DL (ref 8.6–10.4)
CHLORIDE BLD-SCNC: 107 MMOL/L (ref 98–107)
CO2: 24 MMOL/L (ref 20–31)
CREAT SERPL-MCNC: 1.04 MG/DL (ref 0.5–0.9)
DATE, STOOL #1: NORMAL
GFR AFRICAN AMERICAN: >60 ML/MIN
GFR NON-AFRICAN AMERICAN: 52 ML/MIN
GFR SERPL CREATININE-BSD FRML MDRD: ABNORMAL ML/MIN/{1.73_M2}
GLUCOSE BLD-MCNC: 124 MG/DL (ref 70–99)
HEMOCCULT SP1 STL QL: NEGATIVE
POTASSIUM SERPL-SCNC: 4.6 MMOL/L (ref 3.7–5.3)
SODIUM BLD-SCNC: 141 MMOL/L (ref 135–144)
TIME, STOOL #1: NORMAL

## 2022-04-10 PROCEDURE — 97535 SELF CARE MNGMENT TRAINING: CPT

## 2022-04-10 PROCEDURE — 97116 GAIT TRAINING THERAPY: CPT

## 2022-04-10 PROCEDURE — 99232 SBSQ HOSP IP/OBS MODERATE 35: CPT | Performed by: INTERNAL MEDICINE

## 2022-04-10 PROCEDURE — 80048 BASIC METABOLIC PNL TOTAL CA: CPT

## 2022-04-10 PROCEDURE — 36415 COLL VENOUS BLD VENIPUNCTURE: CPT

## 2022-04-10 PROCEDURE — 1180000000 HC REHAB R&B

## 2022-04-10 PROCEDURE — 94640 AIRWAY INHALATION TREATMENT: CPT

## 2022-04-10 PROCEDURE — 6370000000 HC RX 637 (ALT 250 FOR IP): Performed by: NURSE PRACTITIONER

## 2022-04-10 PROCEDURE — 97530 THERAPEUTIC ACTIVITIES: CPT

## 2022-04-10 PROCEDURE — 94761 N-INVAS EAR/PLS OXIMETRY MLT: CPT

## 2022-04-10 PROCEDURE — 6370000000 HC RX 637 (ALT 250 FOR IP): Performed by: PHYSICAL MEDICINE & REHABILITATION

## 2022-04-10 PROCEDURE — 6370000000 HC RX 637 (ALT 250 FOR IP): Performed by: STUDENT IN AN ORGANIZED HEALTH CARE EDUCATION/TRAINING PROGRAM

## 2022-04-10 PROCEDURE — 6370000000 HC RX 637 (ALT 250 FOR IP): Performed by: INTERNAL MEDICINE

## 2022-04-10 PROCEDURE — 97110 THERAPEUTIC EXERCISES: CPT

## 2022-04-10 PROCEDURE — 82272 OCCULT BLD FECES 1-3 TESTS: CPT

## 2022-04-10 PROCEDURE — 99232 SBSQ HOSP IP/OBS MODERATE 35: CPT | Performed by: STUDENT IN AN ORGANIZED HEALTH CARE EDUCATION/TRAINING PROGRAM

## 2022-04-10 PROCEDURE — 2700000000 HC OXYGEN THERAPY PER DAY

## 2022-04-10 RX ORDER — CITALOPRAM 20 MG/1
20 TABLET ORAL DAILY
Qty: 30 TABLET | Refills: 0 | Status: SHIPPED | OUTPATIENT
Start: 2022-04-10 | End: 2022-06-27 | Stop reason: SDUPTHER

## 2022-04-10 RX ORDER — TRAMADOL HYDROCHLORIDE 50 MG/1
50 TABLET ORAL EVERY 12 HOURS PRN
Qty: 10 TABLET | Refills: 0 | Status: SHIPPED | OUTPATIENT
Start: 2022-04-10 | End: 2022-04-15

## 2022-04-10 RX ORDER — FENOFIBRATE 134 MG/1
134 CAPSULE ORAL
Qty: 30 CAPSULE | Refills: 0 | Status: SHIPPED | OUTPATIENT
Start: 2022-04-10 | End: 2022-05-23 | Stop reason: SDUPTHER

## 2022-04-10 RX ORDER — PRAMIPEXOLE DIHYDROCHLORIDE 0.5 MG/1
0.5 TABLET ORAL NIGHTLY
Qty: 30 TABLET | Refills: 0 | Status: SHIPPED | OUTPATIENT
Start: 2022-04-10 | End: 2022-07-05 | Stop reason: SDUPTHER

## 2022-04-10 RX ORDER — AMLODIPINE BESYLATE 5 MG/1
5 TABLET ORAL DAILY
Qty: 30 TABLET | Refills: 0 | Status: SHIPPED | OUTPATIENT
Start: 2022-04-10 | End: 2022-07-21

## 2022-04-10 RX ORDER — BUSPIRONE HYDROCHLORIDE 5 MG/1
5 TABLET ORAL 3 TIMES DAILY
Qty: 90 TABLET | Refills: 0 | Status: SHIPPED | OUTPATIENT
Start: 2022-04-10 | End: 2022-05-02 | Stop reason: SDUPTHER

## 2022-04-10 RX ORDER — GABAPENTIN 100 MG/1
200 CAPSULE ORAL 2 TIMES DAILY
Qty: 120 CAPSULE | Refills: 0 | Status: SHIPPED | OUTPATIENT
Start: 2022-04-10 | End: 2022-05-02 | Stop reason: SDUPTHER

## 2022-04-10 RX ORDER — ATORVASTATIN CALCIUM 40 MG/1
40 TABLET, FILM COATED ORAL NIGHTLY
Qty: 30 TABLET | Refills: 0 | Status: SHIPPED | OUTPATIENT
Start: 2022-04-10 | End: 2022-09-06 | Stop reason: DRUGHIGH

## 2022-04-10 RX ORDER — CARVEDILOL 12.5 MG/1
12.5 TABLET ORAL 2 TIMES DAILY
Qty: 60 TABLET | Refills: 0 | Status: SHIPPED | OUTPATIENT
Start: 2022-04-10 | End: 2022-10-20 | Stop reason: SDUPTHER

## 2022-04-10 RX ORDER — PREDNISONE 20 MG/1
40 TABLET ORAL DAILY
Qty: 2 TABLET | Refills: 0 | Status: SHIPPED | OUTPATIENT
Start: 2022-04-11 | End: 2022-04-12 | Stop reason: HOSPADM

## 2022-04-10 RX ORDER — LOSARTAN POTASSIUM 100 MG/1
100 TABLET ORAL DAILY
Qty: 30 TABLET | Refills: 0 | Status: ON HOLD | OUTPATIENT
Start: 2022-04-10 | End: 2022-06-08 | Stop reason: HOSPADM

## 2022-04-10 RX ORDER — BENZONATATE 100 MG/1
100 CAPSULE ORAL 3 TIMES DAILY PRN
Qty: 21 CAPSULE | Refills: 0 | Status: SHIPPED | OUTPATIENT
Start: 2022-04-10 | End: 2022-04-17

## 2022-04-10 RX ADMIN — GABAPENTIN 200 MG: 100 CAPSULE ORAL at 20:31

## 2022-04-10 RX ADMIN — BENZONATATE 100 MG: 100 CAPSULE ORAL at 08:32

## 2022-04-10 RX ADMIN — GUAIFENESIN DEXTROMETHORPHAN HYDROBROMIDE ORAL SOLUTION 10 ML: 200; 20 SOLUTION ORAL at 20:29

## 2022-04-10 RX ADMIN — FENOFIBRATE 160 MG: 160 TABLET ORAL at 08:32

## 2022-04-10 RX ADMIN — CITALOPRAM HYDROBROMIDE 20 MG: 20 TABLET ORAL at 08:32

## 2022-04-10 RX ADMIN — IPRATROPIUM BROMIDE AND ALBUTEROL SULFATE 1 AMPULE: 2.5; .5 SOLUTION RESPIRATORY (INHALATION) at 12:23

## 2022-04-10 RX ADMIN — IPRATROPIUM BROMIDE AND ALBUTEROL SULFATE 1 AMPULE: 2.5; .5 SOLUTION RESPIRATORY (INHALATION) at 15:43

## 2022-04-10 RX ADMIN — APIXABAN 5 MG: 5 TABLET, FILM COATED ORAL at 08:32

## 2022-04-10 RX ADMIN — CARVEDILOL 12.5 MG: 12.5 TABLET, FILM COATED ORAL at 08:32

## 2022-04-10 RX ADMIN — AMLODIPINE BESYLATE 5 MG: 5 TABLET ORAL at 08:33

## 2022-04-10 RX ADMIN — IPRATROPIUM BROMIDE AND ALBUTEROL SULFATE 1 AMPULE: 2.5; .5 SOLUTION RESPIRATORY (INHALATION) at 06:48

## 2022-04-10 RX ADMIN — APIXABAN 5 MG: 5 TABLET, FILM COATED ORAL at 20:30

## 2022-04-10 RX ADMIN — ATORVASTATIN CALCIUM 40 MG: 40 TABLET, FILM COATED ORAL at 20:30

## 2022-04-10 RX ADMIN — BUSPIRONE HYDROCHLORIDE 5 MG: 5 TABLET ORAL at 08:35

## 2022-04-10 RX ADMIN — IPRATROPIUM BROMIDE AND ALBUTEROL SULFATE 1 AMPULE: 2.5; .5 SOLUTION RESPIRATORY (INHALATION) at 20:07

## 2022-04-10 RX ADMIN — PRAMIPEXOLE DIHYDROCHLORIDE 0.5 MG: 0.25 TABLET ORAL at 20:30

## 2022-04-10 RX ADMIN — LOSARTAN POTASSIUM 100 MG: 100 TABLET, FILM COATED ORAL at 08:32

## 2022-04-10 RX ADMIN — CALCIUM CARBONATE 600 MG (1,500 MG)-VITAMIN D3 400 UNIT TABLET 1 TABLET: at 08:32

## 2022-04-10 RX ADMIN — GABAPENTIN 200 MG: 100 CAPSULE ORAL at 08:32

## 2022-04-10 RX ADMIN — POLYETHYLENE GLYCOL 3350 17 G: 17 POWDER, FOR SOLUTION ORAL at 08:32

## 2022-04-10 RX ADMIN — FERROUS SULFATE TAB 325 MG (65 MG ELEMENTAL FE) 325 MG: 325 (65 FE) TAB at 08:32

## 2022-04-10 RX ADMIN — BUSPIRONE HYDROCHLORIDE 5 MG: 5 TABLET ORAL at 13:31

## 2022-04-10 RX ADMIN — CARVEDILOL 12.5 MG: 12.5 TABLET, FILM COATED ORAL at 20:30

## 2022-04-10 RX ADMIN — CYANOCOBALAMIN TAB 1000 MCG 1000 MCG: 1000 TAB at 08:32

## 2022-04-10 RX ADMIN — Medication 6 MG: at 20:31

## 2022-04-10 RX ADMIN — BENZONATATE 100 MG: 100 CAPSULE ORAL at 17:53

## 2022-04-10 RX ADMIN — FERROUS SULFATE TAB 325 MG (65 MG ELEMENTAL FE) 325 MG: 325 (65 FE) TAB at 20:30

## 2022-04-10 RX ADMIN — PREDNISONE 40 MG: 20 TABLET ORAL at 08:32

## 2022-04-10 RX ADMIN — BUSPIRONE HYDROCHLORIDE 5 MG: 5 TABLET ORAL at 20:29

## 2022-04-10 ASSESSMENT — 9 HOLE PEG TEST
TESTTIME_SECONDS: 40
TESTTIME_SECONDS: 32
TEST_RESULT: IMPAIRED
TEST_RESULT: IMPAIRED

## 2022-04-10 NOTE — PLAN OF CARE
Problem: Skin Integrity:  Goal: Absence of new skin breakdown  Description: Absence of new skin breakdown  Outcome: Ongoing  Note: Skin assessment completed this shift. Nutrition and Hydration status assessed with adequate intake. Roger Score as charted. Bilateral heels remain elevated on pillows throughout the shift. Patient able to reposition self for comfort and to prevent breakdown. Patient verbalizes understanding of pressure ulcer prevention measures. Skin integrity maintained. No new skin breakdown noted. Skin to high risk pressure areas including coccyx and heels are clear. Myah / Incontinence care provided as needed throughout the shift. Problem: Falls - Risk of:  Goal: Will remain free from falls  Description: Will remain free from falls  Outcome: Ongoing  Note: No falls or injuries sustained at this time. No attempts to get out of bed without nursing assistance. Call light within reach and pt. uses appropriately for assistance. Siderails up x 2. Nonskid footwear remains on. Bed in low and locked position. Hourly nursing rounds made. Pt. Alert and oriented, aware of limitations, and exhibits good safety judgement. Pt. uses assistive devices appropriately including [place]. Pt. understands individual fall risk factors. Pt. reoriented to surroundings and reminded to use call light with each nurse/patient interaction. Bed alarm remains engaged throughout the shift as a precaution. Problem: Pain:  Goal: Control of acute pain  Description: Control of acute pain  Outcome: Ongoing  Note: Pain assessment completed. Pt. able to rest.  Patient medicated with Ultram for sternal and back pain this shift as ordered. Patient relates a tolerable level of discomfort with the current medication. Pt. Repositions per self for comfort. Nonverbal cues indicate pain relief. Pt. Rests quietly with eyes closed after pain medication administration. Respirations easy and unlabored.  Appears free from distress.

## 2022-04-10 NOTE — PROGRESS NOTES
[address]    ACUTE REHABILITATION OCCUPATIONAL THERAPY  DAILY NOTE    Date: 4/10/22  Patient Name: Lewis Maldonado      Room: 4981/6296-92    MRN: [id number]   : 1950  (71 y.o.)  Gender: female   Referring Practitioner: Haile Prado MD  Diagnosis: Cervical myelopathy  Additional Pertinent Hx: 71 y.o.  female, with a history of anemia, spondylolisthesis, chronic DVT, chronic diastolic heart failure, CVA, major depressive disorder, s/p cervical spine fusion, stenosis of cervical spine with myelopathy, and DM type II, who presents with leg pain, shortness of breath, fall, and neck pain. According to patient and her , patient has had multiple falls recently including a fall Monday and evening and again on Tuesday. Patient reports that today she felt extremely weak upon waking. Restrictions  Restrictions/Precautions: General Precautions,Fall Risk  Implants present? : Metal implants (cervical and lumbar surgeries, L wrist ORIF)  Other position/activity restrictions: up as tolerated; O2 3L/m via NC  Required Braces or Orthoses?: No  Equipment Used: Other (RW)    Subjective  Subjective: \"I feel much better today\"  Comments: Pt pleasent and agreeable for OT treatment. Patient Currently in Pain: Denies  Restrictions/Precautions: General Precautions; Fall Risk  Overall Orientation Status: Within Functional Limits     Pain Assessment  Pain Assessment: 0-10  Pain Level: 5  Pain Type: Acute pain  Pain Location: Chest  Pain Orientation: Mid    Objective  Cognition  Overall Cognitive Status: WFL  Perception  Overall Perceptual Status: WFL  Balance  Sitting Balance: Modified independent   Standing Balance: Stand by assistance  Transfers  Sit to stand: Stand by assistance  Stand to sit: Stand by assistance  Transfer Comments: verbal cues for hand placement and safety  Standing Balance  Time: AM: 1 min, 15 min  Activity: ADLs, functional mobility   Comment: with RW  Functional Mobility  Functional - Mobility Device: Rolling Walker  Activity: Retrieve items;Transport items; To/from bathroom  Assist Level: Stand by assistance  Functional Mobility Comments: with minimal verbal cues for safety  Toilet Transfers  Toilet - Technique: Ambulating  Equipment Used: Grab bars  Toilet Transfer: Stand by assistance  Toilet Transfers Comments: with RW  Shower Transfers  Shower - Transfer From: Fam Driscoll - Transfer Type: To and From  Shower - Transfer To: Transfer tub bench  Shower - Technique: Ambulating  Shower Transfers: Stand by assistance;Contact Guard (SBA entering, CGA exiting)  Shower Transfers Comments: Verbal cues for hand placement and safety over ramp  Fine Motor: Pt instruction in [facility] and instrinsic hand strengthening for ease and safety with ADLs. Pt places golf tees in slots with increased time. Next engages in manipulation of cards to play game at tabletop. Reports she feels her hands are getting stronger. ADL  UE Bathing: Stand by assistance  LE Bathing: Stand by assistance  UE Dressing: Stand by assistance  LE Dressing: Stand by assistance (TA for TEDs. SBA for all everything else)  Toileting: Stand by assistance  Additional Comments: QUINONES facilitated pts engagement in full shower on this date with use of tub bench, grab bars, hand held shower head, and LHS. Completes UB dressing while sitting on bench, then ambulates to toilet for LB per home setup. Demos with good use of reacher for threading BLEs. Then stands at sink for grooming.             Left Hand Strength -  (lbs)  Handle Setting 2: 36.3 (32, 37, 40) norms 32-54#        Right Hand Strength -  (lbs)  Handle Setting 2: 39.6# (37, 40, 42) norms 32-54#        Fine Motor Skills  Left 9-Hole Peg Test: Impaired  Left 9 Hole Peg Test Time (secs): 40 (norm 21-30s)  Right 9-Hole Peg Test: Impaired  Right 9 Hole Peg Test Time (secs): 32 (norm 21-30s)           Assessment  Performance deficits / Impairments: Decreased functional mobility ; Decreased ADL status; Decreased strength;Decreased endurance;Decreased sensation;Decreased balance;Decreased high-level IADLs;Decreased fine motor control;Decreased coordination  Prognosis: Good  Discharge Recommendations: 24 hour supervision or assist;Home with Home health OT  Activity Tolerance: Patient Tolerated treatment well  Safety Devices in place: Yes  Type of devices: Left in chair;Call light within reach; Patient at risk for falls          Patient Education: OT POC, safety with functional mobility and keeping with RW when turning, safety with shower transfers, modified techniques for ADLs  Patient Goals   Patient goals : \"To be able to put my pants/socks on better\"  Learner:patient  Method: explanation       Outcome: acknowledged understanding of         Plan  Plan  Times per week: 5-7  Times per day: Twice a day  Current Treatment Recommendations: Balance Training,Functional Mobility Training,Endurance Training,Strengthening,ROM,Safety Education & Training,Patient/Caregiver Education & Training,Equipment Evaluation, Education, & procurement,Self-Care / Debby Drewes Management  Patient Goals   Patient goals :  \"To be able to put my pants/socks on better\"  Short term goals  Time Frame for Short term goals: 1 week   Short term goal 1: Pt will complete LB dressing/bathing/toileting CGA with Good safety with use of AE/DME/modified techniques for increased IND with self care  Short term goal 2: Pt will participate in 30+ minutes functional activity for increased strength/balance/endurance for increased IND in ADL's  Short term goal 3: Pt will complete transfers/functional mobility CGA with Good safety and RW for increased IND in ADL's  Short term goal 4: Pt will verbalize/demonstrate Good understanding of AE/DME/modified techniques for increased IND in self care  Short term goal 5: Pt will complete UB bathing/dressing/grooming with Supervision for increased IND in self care  Long term goals  Time Frame for Long term goals : by discharge   Long term goal 1: Pt will complete toileting/grooming/dressing Mod I and bathing with Supervison with Good safety with use of AE/DME/modified techniques for increased IND with self care  Long term goal 2: Pt will complete functional mobility/transferes during functional activity Mod I with Good safety and RW for increased IND in ADL's  Long term goal 3: Pt will verbalize/demonstrate Good understanding of fall prevention strategies for increased safety/IND in self care  Long term goal 4: Pt will improve L hand strength for self care as evidence by a 3# improvement in dynomometor testing   Long term goal 5: Pt will improve L FMC as evidence by a 20 second improvement on 9 hole peg test for increased IND with self care        04/10/22 1250 04/10/22 1548   OT Individual Minutes   Time In 1107 1411   Time Out 1201 1447   Minutes 54 36     Electronically signed by PATRICIA Davidson on 4/10/22 at 3:49 PM EDT

## 2022-04-10 NOTE — PROGRESS NOTES
Physical Medicine & Rehabilitation  Progress Note      Subjective:      Anthony Schrader is a 71 y.o. female with cervical myelopathy. She reports feeling much better today. She states that she had a better night and was able to get some sleep as well. She is asking about discharge tomorrow - will plan for this as long as things remain improved. Asked IM about restarting lasix, as creatinine is improved. She is currently on 1 L nasal cannula oxygen - placed order for home oxygen evaluation. She denies any other acute concerns. ROS:  Denies fevers, chills, sweats. No chest pain, palpitations, lightheadedness. +coughing, wheezing - improving  Denies abdominal pain, nausea, diarrhea or constipation. No new areas of joint pain. Denies new areas of numbness or weakness. Denies new anxiety or depression issues. No new skin problems. Rehabilitation:   Progressing in therapies. PT:  Restrictions/Precautions: General Precautions,Fall Risk  Implants present? : Metal implants (cervical and lumbar surgeries, L wrist ORIF)  Other position/activity restrictions: up as tolerated; O2 3L/m via NC   Transfers  Sit to Stand: Contact guard assistance (w/ RW + VC's for technique)  Stand to sit: Contact guard assistance (w/ RW + VC's for technique)  Bed to Chair: Contact guard assistance,Minimal assistance (w/ RW + VC's for technique)  Stand Pivot Transfers: Contact guard assistance,Minimal Assistance (w/ RW + VC's for technique)  Comment: Hx of pushing backwards and relying on back of legs for support against sitting surface. Does much better when cued to scoot forward to edge of chair/bed, position her feet, and to bring her \"nose over her toes\"  Ambulation 1  Surface: level tile  Device: Rolling Walker  Other Apparatus: Wheelchair follow  Assistance: Contact guard assistance  Quality of Gait: Slow tawanda, Lack of hip and knee flexion on LLE, minimizing step height.  Continued cues to  LLE more and promote Heel-toe strike, with poor carryover. Slight fwd flexed posture with cues to correct  Gait Deviations: Slow Tawanda,Decreased step length,Decreased step height  Distance: 202'  Comments: 2MWT 76'. Seated rest at 104' before finishing ambulation. Transfers  Sit to Stand: Contact guard assistance (w/ RW + VC's for technique)  Stand to sit: Contact guard assistance (w/ RW + VC's for technique)  Bed to Chair: Contact guard assistance,Minimal assistance (w/ RW + VC's for technique)  Stand Pivot Transfers: Contact guard assistance,Minimal Assistance (w/ RW + VC's for technique)  Comment: Hx of pushing backwards and relying on back of legs for support against sitting surface. Does much better when cued to scoot forward to edge of chair/bed, position her feet, and to bring her \"nose over her toes\"  Ambulation  Ambulation?: Yes  More Ambulation?: Yes  Ambulation 1  Surface: level tile  Device: Rolling Walker  Other Apparatus: Wheelchair follow  Assistance: Contact guard assistance  Quality of Gait: Slow tawanda, Lack of hip and knee flexion on LLE, minimizing step height. Continued cues to  LLE more and promote Heel-toe strike, with poor carryover. Slight fwd flexed posture with cues to correct  Gait Deviations: Slow Tawanda,Decreased step length,Decreased step height  Distance: 202'  Comments: 2MWT 76'. Seated rest at 104' before finishing ambulation. Surface: level tile  Ambulation 1  Surface: level tile  Device: Rolling Walker  Other Apparatus: Wheelchair follow  Assistance: Contact guard assistance  Quality of Gait: Slow tawanda, Lack of hip and knee flexion on LLE, minimizing step height. Continued cues to  LLE more and promote Heel-toe strike, with poor carryover. Slight fwd flexed posture with cues to correct  Gait Deviations: Slow Tawanda,Decreased step length,Decreased step height  Distance: 202'  Comments: 2MWT 76'. Seated rest at 104' before finishing ambulation.     OT:  ADL  Equipment Provided: Reacher,Long-handled sponge  Feeding: Modified independent  (Per pt report)  Grooming: Modified independent  (from w/c level)  UE Bathing: Stand by assistance  LE Bathing: Stand by assistance  UE Dressing: Stand by assistance  LE Dressing: Stand by assistance (TA for TEDs. SBA for all everything else)  Toileting: Stand by assistance  Additional Comments: QUINONES facilitated pts engagement in full shower on this date with use of tub bench, grab bars, hand held shower head, and LHS. Completes UB dressing while sitting on bench, them ambulates to toilet for LB per home setup. Demos with good use of reacher for threading BLEs. Then stands at sink for grooming. Instrumental ADL's  Instrumental ADLs: Yes     Balance  Sitting Balance: Modified independent   Standing Balance: Stand by assistance   Standing Balance  Time: AM: 1 min, 15 min  Activity: ADLs, functional mobility   Comment: with RW  Functional Mobility  Functional - Mobility Device: Rolling Walker  Activity: Retrieve items,Transport items,To/from bathroom  Assist Level: Stand by assistance  Functional Mobility Comments: with minimal verbal cues for safety     Bed mobility  Rolling to Left: Stand by assistance  Rolling to Right: Stand by assistance  Supine to Sit: Contact guard assistance  Sit to Supine: Contact guard assistance  Scooting: Contact guard assistance  Comment: bed mobility on mat table  Transfers  Stand Pivot Transfers: Stand by assistance  Sit to stand: Stand by assistance  Stand to sit: Stand by assistance  Transfer Comments: verbal cues for hand placement and safety   Toilet Transfers  Toilet - Technique: Ambulating  Equipment Used: Grab bars  Toilet Transfer: Stand by assistance  Toilet Transfers Comments: with RW     Shower Transfers  Shower - Transfer From: Walker  Shower - Transfer Type: To and From  Shower - Transfer To:  Transfer tub bench  Shower - Technique: Ambulating  Shower Transfers: Stand by [employer] (SBA entering, CGA exiting)  Shower Transfers Comments: Verbal cues for hand placement and safety over ramp  Wheelchair Bed Transfers  Wheelchair/Bed - Technique: Stand pivot  Equipment Used: Other (RW)  Level of Asssistance:  Moderate assistance  Wheelchair Transfers Comments: with RW; Blocking of L foot due to slipping    SPEECH:      Current Medications:   Current Facility-Administered Medications: predniSONE (DELTASONE) tablet 40 mg, 40 mg, Oral, Daily  benzonatate (TESSALON) capsule 100 mg, 100 mg, Oral, TID PRN  albuterol (PROVENTIL) nebulizer solution 2.5 mg, 2.5 mg, Nebulization, As Directed RT PRN  perflutren lipid microspheres (DEFINITY) injection 1.65 mg, 1.5 mL, IntraVENous, ONCE PRN  Benzocaine-Menthol (CEPACOL SORE THROAT) 15-2.6 MG lozenge 1 lozenge, 1 lozenge, Oral, Q2H PRN  dextromethorphan-guaiFENesin (ROBITUSSIN-DM)  MG/5ML liquid 10 mL, 10 mL, Oral, Q4H PRN  ipratropium-albuterol (DUONEB) nebulizer solution 1 ampule, 1 ampule, Inhalation, 4x daily  hydrOXYzine (ATARAX) tablet 10 mg, 10 mg, Oral, TID PRN  sodium chloride (OCEAN, BABY AYR) 0.65 % nasal spray 1 spray, 1 spray, Each Nostril, PRN  losartan (COZAAR) tablet 100 mg, 100 mg, Oral, Daily  amLODIPine (NORVASC) tablet 5 mg, 5 mg, Oral, Daily  traMADol (ULTRAM) tablet 50 mg, 50 mg, Oral, Q6H PRN  gabapentin (NEURONTIN) capsule 200 mg, 200 mg, Oral, BID  melatonin tablet 6 mg, 6 mg, Oral, Nightly PRN  lidocaine 4 % external patch 1 patch, 1 patch, TransDERmal, Daily  busPIRone (BUSPAR) tablet 5 mg, 5 mg, Oral, TID  magnesium hydroxide (MILK OF MAGNESIA) 400 MG/5ML suspension 30 mL, 30 mL, Oral, Daily PRN  apixaban (ELIQUIS) tablet 5 mg, 5 mg, Oral, BID  atorvastatin (LIPITOR) tablet 40 mg, 40 mg, Oral, Nightly  calcium carbonate w/vitamin D (CALTRATE) 600-400 MG-UNIT per tab 1 tablet, 1 tablet, Oral, Daily  carvedilol (COREG) tablet 12.5 mg, 12.5 mg, Oral, BID  citalopram (CELEXA) tablet 20 mg, 20 mg, Oral, Daily  docusate sodium (COLACE) capsule 100 mg, 100 mg, Oral, BID PRN  fenofibrate (TRIGLIDE) tablet 160 mg, 160 mg, Oral, Daily  ferrous sulfate (IRON 325) tablet 325 mg, 325 mg, Oral, BID  [Held by provider] furosemide (LASIX) tablet 20 mg, 20 mg, Oral, BID  pramipexole (MIRAPEX) tablet 0.5 mg, 0.5 mg, Oral, Nightly  vitamin B-12 (CYANOCOBALAMIN) tablet 1,000 mcg, 1,000 mcg, Oral, Daily  acetaminophen (TYLENOL) tablet 650 mg, 650 mg, Oral, Q4H PRN  polyethylene glycol (GLYCOLAX) packet 17 g, 17 g, Oral, Daily  senna (SENOKOT) tablet 17.2 mg, 2 tablet, Oral, Daily PRN  bisacodyl (DULCOLAX) suppository 10 mg, 10 mg, Rectal, Daily PRN      Objective:  BP (!) 142/55   Pulse 61   Temp 97.9 °F (36.6 °C) (Oral)   Resp 19   Ht 5' 3\" (1.6 m)   Wt 181 lb (82.1 kg)   SpO2 95%   BMI 32.06 kg/m²       GEN: Well developed, well nourished, no acute distress  HEENT: NCAT. EOM grossly intact. Hearing grossly intact. Mucous membranes pink and moist.  RESP: Minimal wheezing noted - improving. Respirations WNL and unlabored. No cough noted today during evaluation. On nasal cannula oxygen. CV: Regular rate and rhythm. No murmurs, rubs, or gallops. ABD: Soft, non-distended, BS+ and equal.  NEURO: Alert. Speech fluent. Sensation to light touch intact in bilateral lower limbs. MSK:  Muscle tone and bulk are normal bilaterally. Moving bilateral upper limbs with at least antigravity strength. Strength 4+/5 in bilateral lower limbs. LIMBS: No edema in bilateral lower limbs. SKIN: Warm and dry with good turgor. PSYCH: Mood WNL. Affect WNL. Appropriately interactive. Diagnostics:     CBC:   No results for input(s): WBC, RBC, HGB, HCT, MCV, RDW, PLT in the last 72 hours. BMP:   Recent Labs     04/08/22  0655 04/09/22  0654 04/10/22  0628    136 141   K 4.2 4.3 4.6    101 107   CO2 24 22 24   BUN 65* 55* 43*   CREATININE 1.56* 1.27* 1.04*   GLUCOSE 110* 170* 124*     BNP: No results for input(s): BNP in the last 72 hours.   PT/INR: No results for input(s): PROTIME, INR in the last 72 hours. APTT: No results for input(s): APTT in the last 72 hours. CARDIAC ENZYMES: No results for input(s): CKMB, CKMBINDEX, TROPONINT in the last 72 hours. Invalid input(s): CKTOTAL;3  FASTING LIPID PANEL:  Lab Results   Component Value Date    CHOL 103 05/20/2019    HDL 74 03/12/2021    TRIG 66 05/20/2019     LIVER PROFILE: No results for input(s): AST, ALT, ALB, BILIDIR, BILITOT, ALKPHOS in the last 72 hours. Impression/Plan:   Impaired ADLs, gait, and mobility due to:    1. Cervical myelopathy:  S/p previous cervical decompression. With ataxia. PT/OT  for gait, mobility, strengthening, endurance, ADLs, and self care. Pain control with gabapentin, as-needed tylenol, as-needed tramadol. 2. Elevated creatinine:  Creatinine 1.04 on 4/10, improving. IM holding lasix, restarted losartan. Asked IM about restarting lasix. Will recheck BMP in the morning. 3. Suspected costochondritis:  Pain control with as-needed tramadol, as-needed tylenol. 4. Bronchitis:  Completed course of steroids and zithromax with improvement. Having continued cough and increased wheezing a few days ago. CT chest showed no pneumonia or edema. On scheduled duo-neb. Has robitussin as needed - changed to robitussin-DM on 4/8. Added cepacol lozenges as needed on 4/8. Gave one-time dose of prednisone on 4/8, added 4 more doses on 4/9 after discussion with IM. Wean oxygen as tolerated - ordered home oxygen evaluation. 5. Anemia:  Hemoglobin 11.7 on 4/7, stable to improved. Monitoring. On oral iron supplementation. 6. Lower limb cellulitis:  Resolved. Completed course of keflex. 7. CAD:  On atorvastatin, fenofibrate  8. Chronic diastolic heart failure: On lasix (currently on hold)  9. HTN:  On amlodipine, carvedilol, losartan  10. Chronic DVT:  On eliquis  11. Depression: On celexa. IM added buspirone. Patient previously declined psych evaluation.   12. Restless leg syndrome: On pramipexole  13. Nasal congestion, dryness: Added saline nasal spray as needed on 4/4. 14. Bowel Management: Miralax daily, senokot prn, dulcolax prn, docusate BID prn, milk of magnesia prn  15. DVT prophylaxis:  On eliquis  16. Internal Medicine for medical management  17.  Discharge follow ups:  PCP 1-2 weeks, Dr. Isaac Valenzuela 4/19/22      Electronically signed by Kenisha Gomez MD on 4/10/2022 at 4:55 PM

## 2022-04-10 NOTE — PROGRESS NOTES
Physical Therapy  Facility/Department: [facility] ACUTE REHAB  Daily Treatment Note  NAME: Eliana Lorenzana  : 1950  MRN: [id number]    Date of Service: 4/10/2022    Discharge Recommendations:  Patient would benefit from continued therapy after discharge        Assessment   Body structures, Functions, Activity limitations: Decreased functional mobility ; Decreased ADL status; Decreased ROM; Decreased strength;Decreased endurance;Decreased balance;Decreased posture; Increased pain  Specific instructions for Next Treatment: transfers, amb, balance, standing program  Barriers to Learning: none  REQUIRES PT FOLLOW UP: Yes  Activity Tolerance  Activity Tolerance: Patient Tolerated treatment well     Patient Diagnosis(es): The primary encounter diagnosis was Cervical myelopathy ([facility] [address] Diagnoses of Depression, unspecified depression type, Cerebrovascular accident (CVA), unspecified mechanism ([address] Hyperlipidemia, unspecified hyperlipidemia type, RLS (restless legs syndrome), Essential hypertension, Localized swelling of both lower legs, Chronic diastolic heart failure ([address] and S/P cervical spinal fusion were also pertinent to this visit.      has a past medical history of Allergic rhinitis, cause unspecified, Back pain, Bowel obstruction (HCC), C. difficile diarrhea, CAD (coronary artery disease), Cardiac murmur, Cellulitis, Cellulitis, Cerebral artery occlusion with cerebral infarction ([address] COVID-19, Diverticulosis of colon (without mention of hemorrhage), GERD (gastroesophageal reflux disease), GERD (gastroesophageal reflux disease), History of blood transfusion, History of CHF (congestive heart failure), History of MI (myocardial infarction), History of ovarian cyst, History of peritonitis, HTN (hypertension), Hx of blood clots, Hyperlipidemia, Intestinal or peritoneal adhesions with obstruction (postoperative) (postinfection) ([address] Kidney infection, Lateral epicondylitis  of elbow, MDRO (multiple drug resistant organisms) resistance, Muscle strain, Other abnormal glucose, PONV (postoperative nausea and vomiting), Pre-diabetes, Restless legs syndrome (RLS), Snores, 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[facility] [address] TIA (transient ischemic attack), Uses walker, Vitamin D deficiency, Wears glasses, and Wellness examination. has a past surgical history that includes Ovary removal (1970); colectomy (9687); Appendectomy (1968); Ovary removal (1971); Hysterectomy (1973); Bunionectomy (Left); sinus surgery (2004); Colonoscopy; other surgical history (08/14/2014); cyst removal (Right); Wrist fracture surgery (Left, 03/05/2019); Upper gastrointestinal endoscopy (N/A, 05/31/2019); Upper gastrointestinal endoscopy (N/A, 08/05/2019); Upper gastrointestinal endoscopy (N/A, 08/23/2019); Abdomen surgery (1976); lumbar fusion (N/A, 02/10/2020); Cardiac catheterization; Cardiac catheterization (2005); [place] tooth extraction; lumbar fusion (N/A, 06/17/2020); back surgery; cervical fusion (05/21/2021); and cervical fusion (N/A, 5/21/2021). Restrictions  Restrictions/Precautions  Restrictions/Precautions: General Precautions,Fall Risk  Required Braces or Orthoses?: No  Implants present? : Metal implants (cervical and lumbar surgeries, L wrist ORIF)  Position Activity Restriction  Other position/activity restrictions: up as tolerated; O2 3L/m via NC  Subjective   General  Chart Reviewed: Yes  Additional Pertinent Hx: TIA  Response To Previous Treatment: Patient reporting fatigue but able to participate. Family / Caregiver Present: No  Referring Practitioner: Dr Jett Cunningham.   Subjective  Subjective: Pt mentions a much better night last night than the previous day, with no coughing episode and able to sleep through the night  Pain Screening  Patient Currently in Pain: Denies  Vital Signs  Patient Currently in Pain: Denies       Orientation  Orientation  Overall Orientation Status: Within Normal Limits  Cognition      Objective   Bed mobility  Supine to Sit: Contact guard assistance  Sit to Supine: Contact guard assistance  Scooting: Contact guard assistance  Transfers  Sit to Stand: Contact guard assistance (w/ RW + VC's for technique)  Stand to sit: Contact guard assistance (w/ RW + VC's for technique)  Bed to Chair: Contact guard assistance;Minimal assistance (w/ RW + VC's for technique)  Stand Pivot Transfers: Contact guard assistance;Minimal Assistance (w/ RW + VC's for technique)  Ambulation  Ambulation?: Yes  More Ambulation?: Yes  Ambulation 1  Surface: level tile  Device: Rolling Walker  Other Apparatus: Wheelchair follow  Assistance: Contact guard assistance  Quality of Gait: Slow tawanda, Lack of hip and knee flexion on LLE, minimizing step height. Continued cues to  LLE more and promote Heel-toe strike, with poor carryover. Slight fwd flexed posture with cues to correct  Gait Deviations: Slow Tawanda;Decreased step length;Decreased step height  Distance: 202'  Comments: 2MWT 76'. Seated rest at 104' before finishing ambulation. Ambulation 2  Surface - 2: level tile  Device 2: Rolling Walker  Other Apparatus 2: Wheelchair follow  Assistance 2: Contact guard assistance;Minimal assistance  Quality of Gait 2: Same as above  Gait Deviations: Slow Tawanda;Decreased step length;Decreased step height  Distance: 179'  Stairs/Curb  Stairs?: Yes  Stairs  # Steps : 10  Stairs Height:  (4\" & 6\")  Rails: Right ascending  Device: No Device  Assistance: Contact guard assistance  Comment: step-to pattern. Cues to remind pt, \"up with good, down with bad\"  Wheelchair Activities  Propulsion: Yes  Propulsion 1  Propulsion: Manual  Level: Level Tile  Method: RUE;LUE  Level of Assistance: Modified independent  Description/ Details: Small propulsions. Distance limited by fatigue  Distance: 102ft     Balance  Posture: Fair  Sitting - Static: Good  Sitting - Dynamic: Fair  Standing - Static: Fair;+  Standing - Dynamic: Fair  Comments: Standing with RW.   Other exercises  Other exercises?: Yes  Other exercises 2: NuStep x 7 mins. L2  Other exercises 3: Standing Ex: BLE x12 (3way hip, Heel/toe, march, HS curl, mini squats,mini lunges)  Other exercises 4: 2MWT 68'  Other exercises 10: STS transfers: x10 (Cues for correct technique to avoid posterior lean w/ stands)  Other exercises 11: Tinetti balance assessment                        G-Code     OutComes Score  Balance Score: 12 (04/10/22 1600)  Gait Score: 5 (04/10/22 1600)        Tinetti Total Score: 17 (04/10/22 1600)                                      AM-PAC Score             Goals  Short term goals  Time Frame for Short term goals: 1 week  Short term goal 1: Pt to demostrate bed mobility CGA/Jennifer. Short term goal 2: Pt to perform transfers CGA. Short term goal 3: Pt to amb 100'-150' with device, CGA. Short term goal 4: Pt to improve BLE strength by 1/2 MMG. Short term goal 5: Pt to improve standing balance to [facility]. Short term goal 6: Pt able to perform 4\" and 6\" steps x3 in //bars or acute stair way, with 2 B UE support, min A  Long term goals  Time Frame for Long term goals : By DC  Long term goal 1: Pt able to perform bed mobility at A  Long term goal 2: Pt able to transfer at supervsion level. Long term goal 3:  Pt able to ambulate house hold distances with assistive device mod-I  Long term goal 4:  Pt able to ambulate distance of 150 ft, level surfaces and shorter distance outside terraine at A. Long term goal 5: Pt able to propel w/c on level surface, distance of 150 ft, supervsion level. Long term goal 6: Improve 2MWT distance to atleast 120 ft to improve overall fucntion. Long term goal 7: Pt to improve Tinetti balance score to atlest 20/28 to reduce fall risk. Long term goal 8: Pt able to perform curb step with B UE support and/or donny to goup and down 4 to 5 steps with 2 rails at min A   Patient Goals   Patient goals :  To get stronger    Plan    Plan  Times per week: 1.5 hr/day, 5 to 7 days/week. Specific instructions for Next Treatment: transfers, amb, balance, standing program  Current Treatment Recommendations: Strengthening,Balance Training,Functional Mobility Training,Transfer Training,Endurance Training,Gait Training,Equipment Evaluation, Education, & procurement,Patient/Caregiver Education & Training,Safety Education & Training,Stair training,Wheelchair Mobility Training  Safety Devices  Type of devices:  All fall risk precautions in place,Gait belt,Left in chair,Call light within reach,Patient at risk for falls     Therapy Time     04/10/22 1003 04/10/22 1453   PT Individual Minutes   Time In 1003 1453   Time Out 1105 1528   Minutes 62 [facility]

## 2022-04-11 LAB
ANION GAP SERPL CALCULATED.3IONS-SCNC: 11 MMOL/L (ref 9–17)
BUN BLDV-MCNC: 36 MG/DL (ref 8–23)
CALCIUM SERPL-MCNC: 9 MG/DL (ref 8.6–10.4)
CHLORIDE BLD-SCNC: 105 MMOL/L (ref 98–107)
CO2: 24 MMOL/L (ref 20–31)
CREAT SERPL-MCNC: 1.07 MG/DL (ref 0.5–0.9)
GFR AFRICAN AMERICAN: >60 ML/MIN
GFR NON-AFRICAN AMERICAN: 51 ML/MIN
GFR SERPL CREATININE-BSD FRML MDRD: ABNORMAL ML/MIN/{1.73_M2}
GLUCOSE BLD-MCNC: 137 MG/DL (ref 70–99)
POTASSIUM SERPL-SCNC: 4.1 MMOL/L (ref 3.7–5.3)
SODIUM BLD-SCNC: 140 MMOL/L (ref 135–144)

## 2022-04-11 PROCEDURE — 2700000000 HC OXYGEN THERAPY PER DAY

## 2022-04-11 PROCEDURE — 97535 SELF CARE MNGMENT TRAINING: CPT

## 2022-04-11 PROCEDURE — 99232 SBSQ HOSP IP/OBS MODERATE 35: CPT | Performed by: INTERNAL MEDICINE

## 2022-04-11 PROCEDURE — 94640 AIRWAY INHALATION TREATMENT: CPT

## 2022-04-11 PROCEDURE — 6370000000 HC RX 637 (ALT 250 FOR IP): Performed by: NURSE PRACTITIONER

## 2022-04-11 PROCEDURE — 6370000000 HC RX 637 (ALT 250 FOR IP): Performed by: INTERNAL MEDICINE

## 2022-04-11 PROCEDURE — 99232 SBSQ HOSP IP/OBS MODERATE 35: CPT | Performed by: PHYSICAL MEDICINE & REHABILITATION

## 2022-04-11 PROCEDURE — 94762 N-INVAS EAR/PLS OXIMTRY CONT: CPT

## 2022-04-11 PROCEDURE — 6370000000 HC RX 637 (ALT 250 FOR IP): Performed by: PHYSICAL MEDICINE & REHABILITATION

## 2022-04-11 PROCEDURE — 6370000000 HC RX 637 (ALT 250 FOR IP): Performed by: STUDENT IN AN ORGANIZED HEALTH CARE EDUCATION/TRAINING PROGRAM

## 2022-04-11 PROCEDURE — 97116 GAIT TRAINING THERAPY: CPT

## 2022-04-11 PROCEDURE — 97110 THERAPEUTIC EXERCISES: CPT

## 2022-04-11 PROCEDURE — 97530 THERAPEUTIC ACTIVITIES: CPT

## 2022-04-11 PROCEDURE — 1180000000 HC REHAB R&B

## 2022-04-11 PROCEDURE — 80048 BASIC METABOLIC PNL TOTAL CA: CPT

## 2022-04-11 PROCEDURE — 36415 COLL VENOUS BLD VENIPUNCTURE: CPT

## 2022-04-11 RX ORDER — FUROSEMIDE 20 MG/1
20 TABLET ORAL DAILY
Qty: 30 TABLET | Refills: 0 | Status: SHIPPED | OUTPATIENT
Start: 2022-04-11 | End: 2022-06-29 | Stop reason: SDUPTHER

## 2022-04-11 RX ADMIN — IPRATROPIUM BROMIDE AND ALBUTEROL SULFATE 1 AMPULE: 2.5; .5 SOLUTION RESPIRATORY (INHALATION) at 06:59

## 2022-04-11 RX ADMIN — LOSARTAN POTASSIUM 100 MG: 100 TABLET, FILM COATED ORAL at 07:37

## 2022-04-11 RX ADMIN — PRAMIPEXOLE DIHYDROCHLORIDE 0.5 MG: 0.25 TABLET ORAL at 22:24

## 2022-04-11 RX ADMIN — GUAIFENESIN DEXTROMETHORPHAN HYDROBROMIDE ORAL SOLUTION 10 ML: 200; 20 SOLUTION ORAL at 20:24

## 2022-04-11 RX ADMIN — ATORVASTATIN CALCIUM 40 MG: 40 TABLET, FILM COATED ORAL at 20:24

## 2022-04-11 RX ADMIN — BUSPIRONE HYDROCHLORIDE 5 MG: 5 TABLET ORAL at 14:27

## 2022-04-11 RX ADMIN — CITALOPRAM HYDROBROMIDE 20 MG: 20 TABLET ORAL at 07:37

## 2022-04-11 RX ADMIN — IPRATROPIUM BROMIDE AND ALBUTEROL SULFATE 1 AMPULE: 2.5; .5 SOLUTION RESPIRATORY (INHALATION) at 12:39

## 2022-04-11 RX ADMIN — APIXABAN 5 MG: 5 TABLET, FILM COATED ORAL at 07:37

## 2022-04-11 RX ADMIN — FERROUS SULFATE TAB 325 MG (65 MG ELEMENTAL FE) 325 MG: 325 (65 FE) TAB at 07:37

## 2022-04-11 RX ADMIN — AMLODIPINE BESYLATE 5 MG: 5 TABLET ORAL at 07:37

## 2022-04-11 RX ADMIN — POLYETHYLENE GLYCOL 3350 17 G: 17 POWDER, FOR SOLUTION ORAL at 07:38

## 2022-04-11 RX ADMIN — BENZONATATE 100 MG: 100 CAPSULE ORAL at 07:37

## 2022-04-11 RX ADMIN — GUAIFENESIN DEXTROMETHORPHAN HYDROBROMIDE ORAL SOLUTION 10 ML: 200; 20 SOLUTION ORAL at 04:47

## 2022-04-11 RX ADMIN — BUSPIRONE HYDROCHLORIDE 5 MG: 5 TABLET ORAL at 20:25

## 2022-04-11 RX ADMIN — IPRATROPIUM BROMIDE AND ALBUTEROL SULFATE 1 AMPULE: 2.5; .5 SOLUTION RESPIRATORY (INHALATION) at 16:19

## 2022-04-11 RX ADMIN — GABAPENTIN 200 MG: 100 CAPSULE ORAL at 07:37

## 2022-04-11 RX ADMIN — GABAPENTIN 200 MG: 100 CAPSULE ORAL at 20:24

## 2022-04-11 RX ADMIN — IPRATROPIUM BROMIDE AND ALBUTEROL SULFATE 1 AMPULE: 2.5; .5 SOLUTION RESPIRATORY (INHALATION) at 20:57

## 2022-04-11 RX ADMIN — CARVEDILOL 12.5 MG: 12.5 TABLET, FILM COATED ORAL at 07:37

## 2022-04-11 RX ADMIN — FENOFIBRATE 160 MG: 160 TABLET ORAL at 07:37

## 2022-04-11 RX ADMIN — CYANOCOBALAMIN TAB 1000 MCG 1000 MCG: 1000 TAB at 07:37

## 2022-04-11 RX ADMIN — BENZONATATE 100 MG: 100 CAPSULE ORAL at 03:20

## 2022-04-11 RX ADMIN — TRAMADOL HYDROCHLORIDE 50 MG: 50 TABLET, COATED ORAL at 20:24

## 2022-04-11 RX ADMIN — APIXABAN 5 MG: 5 TABLET, FILM COATED ORAL at 20:24

## 2022-04-11 RX ADMIN — BENZONATATE 100 MG: 100 CAPSULE ORAL at 16:06

## 2022-04-11 RX ADMIN — FERROUS SULFATE TAB 325 MG (65 MG ELEMENTAL FE) 325 MG: 325 (65 FE) TAB at 20:24

## 2022-04-11 RX ADMIN — CALCIUM CARBONATE 600 MG (1,500 MG)-VITAMIN D3 400 UNIT TABLET 1 TABLET: at 07:37

## 2022-04-11 RX ADMIN — CARVEDILOL 12.5 MG: 12.5 TABLET, FILM COATED ORAL at 20:24

## 2022-04-11 RX ADMIN — PREDNISONE 40 MG: 20 TABLET ORAL at 07:37

## 2022-04-11 RX ADMIN — BUSPIRONE HYDROCHLORIDE 5 MG: 5 TABLET ORAL at 07:39

## 2022-04-11 RX ADMIN — Medication 6 MG: at 20:24

## 2022-04-11 ASSESSMENT — PAIN DESCRIPTION - PAIN TYPE
TYPE: CHRONIC PAIN
TYPE: ACUTE PAIN
TYPE: CHRONIC PAIN

## 2022-04-11 ASSESSMENT — PAIN DESCRIPTION - LOCATION
LOCATION: NECK
LOCATION: NECK
LOCATION: NECK;SHOULDER
LOCATION: CHEST

## 2022-04-11 ASSESSMENT — PAIN SCALES - GENERAL
PAINLEVEL_OUTOF10: 5
PAINLEVEL_OUTOF10: 4
PAINLEVEL_OUTOF10: 5
PAINLEVEL_OUTOF10: 0

## 2022-04-11 ASSESSMENT — PAIN SCALES - WONG BAKER: WONGBAKER_NUMERICALRESPONSE: 4

## 2022-04-11 ASSESSMENT — PAIN DESCRIPTION - DESCRIPTORS
DESCRIPTORS: ACHING
DESCRIPTORS: ACHING

## 2022-04-11 ASSESSMENT — PAIN DESCRIPTION - ORIENTATION
ORIENTATION: PROXIMAL
ORIENTATION: POSTERIOR
ORIENTATION: POSTERIOR;LEFT

## 2022-04-11 ASSESSMENT — PAIN DESCRIPTION - PROGRESSION: CLINICAL_PROGRESSION: GRADUALLY IMPROVING

## 2022-04-11 ASSESSMENT — PAIN DESCRIPTION - ONSET: ONSET: ON-GOING

## 2022-04-11 NOTE — CARE COORDINATION
Writer Calls pt's spouse to update him on D/C planning and potential need for Home O2. Pending O2 Eval this date.     Electronically signed by Chase Dent PT on 4/11/2022 at 11:27 AM

## 2022-04-11 NOTE — PATIENT CARE CONFERENCE
[facility]  TEAM CONFERENCE NOTE  Date: 22  Patient Name: Emanuel Loera       Room: 0540/6983-34  MRN: [id number]       : 1950  (71 y.o.)     Gender: female   Referring Practitioner: Dr Miguel Londono. Cervical myelopathy (HCC) [G95.9]  Diagnosis: Cervical myelopathy     NURSING  Bladder  Always Continent  Bowel   Always Continent  Date of Last BM: 22  Intervention    Bowel Program    Wounds/Incisions/Ulcers: No skin issues identified  Medication Education Program: Patient able to manage medications and being educated by nursing  Pain: Patient's pain is currently controlled with -  Tylenol 650 mg q 4 hours prn, Tramadol 50 mg q 6 hours prn, Gapapentin 200 mg 2 x's a day    Fall Risk:  Falling star program initiated    PHYSICAL THERAPY  Bed mobility  Rolling to Left: Independent  Rolling to Right: Independent  Supine to Sit: Supervision  Sit to Supine: Supervision  Scooting: Independent  Comment: in hospital bed, pt has a hospital bed at home    Transfers:  Sit to Stand: Contact guard assistance;Stand by assistance (VC's for L LE positioning, nose over toes to stand up)  Stand to sit: Contact guard assistance (w/ RW + VC's for technique, pt at times flops back)  Bed to Chair: Contact guard assistance (w/ RW + VC's for technique)    Ambulation 1  Surface: level tile  Device: Rolling Walker    Assistance: Stand by assistance  Quality of Gait: Slow tawanda, Lack of hip and knee flexion on LLE, minimizing step height. Continued cues to  LLE more and promote Heel-toe strike, with poor carryover. Slight fwd flexed head posture with cues to correct. WBOS noted, but no LOB. Pt does drift to the left, educated pt to stop every few steps and reallign  herself with the rolling walker.    Gait Deviations: Slow Tawanda;Decreased step length;Decreased step height  Distance: 87ft (2MWT) ambulates total of 154 ft (2MWT)  Comments: Sao2 93% with exertion at room air  Ambulation 2  Surface - 2: level tile  Device 2: Rolling Walker    Assistance 2: Stand by assistance  Quality of Gait 2: Same as above  Gait Deviations: Slow Alicia;Decreased step length;Decreased step height  Distance: 150 ft  Comments: Pt ambulates again as respiratory therapist comes to assess for home o2. Sao2 reminas > 90% on room air. # Steps : 10  Stairs Height:  (4\" & 6\")  Rails: Right ascending  Device: No Device  Assistance: Contact guard assistance  Comment: step-to pattern. Cues to remind pt, \"up with good, down with bad\" Pt also practises 4: step up at the door frame, supporting herself on door frame, CGA needed for safety-simulated for home entry. Reason if not Attempted: Not attempted due to environmental limitations  CARE Score: 10    Pt presents with frequent falls at home, has generalized weakness, and debility, but L LE weaker thant R LE. Pt with decreased balance, decreased motor planning and weakness contributing to her falls. Pt has made progresss towards her goals, continues to needs assist for sit to stand , as well as cues to remind her for proper L foot placemtn, and to lean forward for sit<>stand. Pt reports she ahs been using a lift chair at home to assist her for si to stand. Pt tolerated activity at room air today. Pt pleased with her progress, educated in continueing her HEP at home. Pt will be starting Home PT and transitioning to outpatient therapy at later date. Pt is a fall risk at this time     Goals  Time Frame for Short term goals: 1 week  Short term goal 1: Pt to demostrate bed mobility CGA/Jennifer. Short term goal 2: Pt to perform transfers CGA. Short term goal 3: Pt to amb 100'-150' with device, CGA. Short term goal 4: Pt to improve BLE strength by 1/2 MMG. Short term goal 5: Pt to improve standing balance to [facility].   Short term goal 6: Pt able to perform 4\" and 6\" steps x3 in //bars or acute stair way, with 2 B UE support, min A      OCCUPATIONAL THERAPY  SELF CARE   Equipment Provided: Reacher  Eating   6  Independent (Per pt report)         Oral Hygiene   4  Assistance Needed: Supervision or touching assistance (standing at sink)     None   Shower/Bathe Self   4  Assistance Needed: Supervision or touching assistance      UE Bathing: None  LE Bathing: Stand by assistance (SBA for standing, completes brice care only )   Upper Body Dressing   4  Assistance Needed: Supervision or touching assistance     None   Lower Body Dressing   4  Assistance Needed: Supervision or touching assistance     Putting On/Taking Off Footwear   3  Assistance Needed: Partial/moderate assistance (for TEDs only.  SBA for shoes)      Stand by assistance;Verbal cueing (A for TEDS  and placing RLE into underwear )   Toilet Transfer   4  Assistance Needed: Supervision or touching assistance      Toilet - Technique: Ambulating  Equipment Used: Grab bars  Toilet Transfer: Stand by assistance  Toilet Transfers Comments: with RW   Toileting Hygiene   4 Assistance Needed: Supervision or touching assistance      None          Shower Transfers: Stand by assistance;Contact Guard (SBA entering, CGA exiting)  Balance  Sitting Balance: Modified independent   Standing Balance: Stand by assistance  Standing Balance  Time: AM: <1 min x2, 1-2 min x2, 2-3 mins;PM: 10-11 mins  Activity: ADLs and fucntional transfers, dynamic standing   Comment: with RW and sink for 0-1 UE support for balance maintenence, no LOB noted    Equipment Recommendations  Equipment Needed:  (TBD)       Short term goals  Time Frame for Short term goals: 1 week   Short term goal 1: Pt will complete LB dressing/bathing/toileting CGA with Good safety with use of AE/DME/modified techniques for increased IND with self care  Short term goal 2: Pt will participate in 30+ minutes functional activity for increased strength/balance/endurance for increased IND in ADL's  Short term goal 3: Pt will complete transfers/functional mobility CGA with Good safety and RW for increased IND in ADL's  Short term goal 4: Pt will verbalize/demonstrate Good understanding of AE/DME/modified techniques for increased IND in self care  Short term goal 5: Pt will complete UB bathing/dressing/grooming with Supervision for increased IND in self care      SPEECH THERAPY      NUTRITION  Weight: 181 lb (82.1 kg) / Body mass index is 32.06 kg/m². Diet Rx: 4 Carbohydrate Choices per meal. PO intake appears adequate with % intake at meals. Last prednisone dose scheduled for 9:00 a.m. 4/12. Ref. Range 4/11/2022 07:38   GLUCOSE, FASTING,GF Latest Ref Range: 70 - 99 mg/dL 137 (H)   Please see nutrition note for details.     SOCIAL WORK ASSESSMENT  Assessment:    Pre-Admission Status:  Lives With: Spouse  Type of Home: House ([facility])  Home Layout: One level,Laundry in basement (Pt does not go down to basement, daughter does laundry)  Home Access: Stairs to enter without rails  Entrance Stairs - Number of Steps: 1  Bathroom Shower/Tub: Tub/Shower unit,Curtain,Shower chair with back  Bathroom Toilet: Standard  Bathroom Equipment: Shower chair,Grab bars in shower,Hand-held shower,Toilet raiser,Grab bars around toilet  Bathroom Accessibility: Walker accessible  Home Equipment: Hospital bed,Rolling walker,4 wheeled walker,Cane,Lift chair,Grab bars,Reacher,Sock aid (pt states she needs new sock aid)  Receives Help From: Family  ADL Assistance: Needs assistance (daughter A with shower t/f 2x/week, IND dressing/toileting)  Homemaking Assistance: Needs assistance (daughter does laundry, pt helps with meals)  Homemaking Responsibilities: Yes ( grocery shops)  Ambulation Assistance: Independent (uses RW)  Transfer Assistance: Needs assistance ( lift chair, A with stairs/transfers to shower)  Active : No  Patient's  Info: family  Occupation: Retired  Type of occupation: hairdresser  IADL Comments: sleeps in lift chair  Additional Comments:  is retired and assists prn - hx of CABG. Daughter works full time - assists with pt 2x/week on medications, laundry, etc. Lives close by. Family Education: Family Education Completed    Percentage Risk for Readmission: High 28% - 100%   Readmission Risk              Risk of Unplanned Readmission:  28       %    Critical Items: None       Problem / Barrier Intervention / Plan  Results   {Impaired function related to debility, B LE weakness, L LE > R LE, multiple falls at home Strengthening, functional mobility training with assistive device. Altered ability to care for self  Training in use of DME/AE and modified care strategies to support self care performance                                Total Self Care Score    Total Mobility Score  Admission Score:  23      Admission Score:  29  Goal:  41/42         Goal:  62/90   `  Discharge Plan   Estimated Discharge Date: 4/12/22  Home evaluation needed? Home Evaluation Indication (NO, Requires ReEval, YES/Date): No home evaluation need indicated for patient at this time  Overnight or Day Pass: No  Factors facilitating achievement of predicted outcomes: Family support, Motivated, Cooperative and Good insight into deficits  Barriers to the achievement of predicted outcomes: Anxiety, Decreased endurance, Long standing deficits, Skin Care and Medication managment    Functional Goals at discharge:  Predicted Outcome: Home with familyPATIENT'S LEVEL OF ASSISTANCE: Stand By Assistance   Discharge therapy goals:  PT: Long term goals  Time Frame for Long term goals : By DC  Long term goal 1: Pt able to perform bed mobility at SBA  Long term goal 2: Pt able to transfer at supervsion level. Long term goal 3:  Pt able to ambulate house hold distances with assistive device mod-I  Long term goal 4:  Pt able to ambulate distance of 150 ft, level surfaces and shorter distance outside terraine at SBA. Long term goal 5: Pt able to propel w/c on level surface, distance of 150 ft, supervsion level.    Long term goal 6: Improve 2MWT distance to atleast 120 ft to improve overall fucntion. Long term goal 7: Pt to improve Tinetti balance score to atlest 20/28 to reduce fall risk. Long term goal 8: Pt able to perform curb step with B UE support and/or able to go up and down 4 to 5 steps with 2 rails at min A   OT:Long term goals  Time Frame for Long term goals : by discharge   Long term goal 1: Pt will complete toileting/grooming/dressing Mod I and bathing with Supervison with Good safety with use of AE/DME/modified techniques for increased IND with self care  Long term goal 2: Pt will complete functional mobility/transferes during functional activity Mod I with Good safety and RW for increased IND in ADL's  Long term goal 3: Pt will verbalize/demonstrate Good understanding of fall prevention strategies for increased safety/IND in self care  Long term goal 4: Pt will improve L hand strength for self care as evidence by a 3# improvement in dynomometor testing   Long term goal 5: Pt will improve L FMC as evidence by a 20 second improvement on 9 hole peg test for increased IND with self care  ST:     Participating Team Members:  /:  Madelyn Serna RN  Occupational Therapist: John Nash OT    Physical Therapist: Wallace Lew PT  Speech Therapist:  N/A  Nurse: Papa Yarbrough RN     Dietary/Nutrition: Daria Crews RD, LD  Pastoral Care: [facility]  CMG: Astrid Palomino RN    I approve the established interdisciplinary plan of care as documented within the medical record of Jocelyne Lora. Jannetta Cheadle.  Francisco Dang MD

## 2022-04-11 NOTE — PLAN OF CARE
Problem: Skin Integrity:  Goal: Absence of new skin breakdown  Description: Absence of new skin breakdown  4/11/2022 1328 by Damir Woodard RN  Outcome: Ongoing  Note: There are no new skin issues so far this shift. Will continue to assist patient with repositioning at least every two hours.

## 2022-04-11 NOTE — PROGRESS NOTES
Physical Therapy  Facility/Department: [facility]  Daily Treatment Note  NAME: Trang Patricia  : 1950  MRN: [id number]    Date of Service: 2022    Discharge Recommendations:  Patient would benefit from continued therapy after discharge,Home with assist PRN   PT Equipment Recommendations  Other: Pt has hospital bed, lift chair, rolling walker, and a Rollator at home. Assessment   Body structures, Functions, Activity limitations: Decreased functional mobility ; Decreased ADL status; Decreased ROM; Decreased strength;Decreased endurance;Decreased balance;Decreased posture; Increased pain  Assessment: Pt presents with frequent falls at home, has generalized weakness, and debility, but L LE weaker thant R LE. Pt with decreased balance, decreased motor planning and weakness contributing to her falls. Pt has made progresss towards her goals, continues to needs assist for sit to stand , as well as cues to remind her for proper L foot placemtn, and to lean forward for sit<>stand. Pt reports she ahs been using a lift chair at home to assist her for si to stand. Pt tolerated activity at room air today. Pt pleased with her progress, educated in continueing her HEP at home. Pt will be starting Home PT and transitioning to outpatient therapy at later date. Pt is a fall risk at this time   Treatment Diagnosis: Impaired functional mobility 2* ataxia  Specific instructions for Next Treatment: transfers, amb, balance, standing program  Prognosis: Good  Decision Making: High Complexity  Exam: ROM, MMT, bed mobility, transfers, amb  Clinical Presentation: Pt alert, cooperative, pleasant  Barriers to Learning: none  REQUIRES PT FOLLOW UP: Yes  Activity Tolerance  Activity Tolerance: Patient Tolerated treatment well     Patient Diagnosis(es): The primary encounter diagnosis was Cervical myelopathy ([facility]  Diagnoses of Depression, unspecified depression type, Cerebrovascular accident (CVA), unspecified mechanism ([facility] Hyperlipidemia, unspecified hyperlipidemia type, RLS (restless legs syndrome), Essential hypertension, Localized swelling of both lower legs, Chronic diastolic heart failure ([address] and S/P cervical spinal fusion were also pertinent to this visit. has a past medical history of Allergic rhinitis, cause unspecified, Back pain, Bowel obstruction (HCC), C. difficile diarrhea, CAD (coronary artery disease), Cardiac murmur, Cellulitis, Cellulitis, Cerebral artery occlusion with cerebral infarction ([address] COVID-19, Diverticulosis of colon (without mention of hemorrhage), GERD (gastroesophageal reflux disease), GERD (gastroesophageal reflux disease), History of blood transfusion, History of CHF (congestive heart failure), History of MI (myocardial infarction), History of ovarian cyst, History of peritonitis, HTN (hypertension), Hx of blood clots, Hyperlipidemia, Intestinal or peritoneal adhesions with obstruction (postoperative) (postinfection) ([address] Kidney infection, Lateral epicondylitis  of elbow, MDRO (multiple drug resistant organisms) resistance, Muscle strain, Other abnormal glucose, PONV (postoperative nausea and vomiting), Pre-diabetes, Restless legs syndrome (RLS), Snores, 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[address] TIA (transient ischemic attack), Uses walker, Vitamin D deficiency, Wears glasses, and Wellness examination. has a past surgical history that includes Ovary removal (1970); colectomy (0789); Appendectomy (1968); Ovary removal (1971); Hysterectomy (1973); Bunionectomy (Left); sinus surgery (2004); Colonoscopy; other surgical history (08/14/2014); cyst removal (Right); Wrist fracture surgery (Left, 03/05/2019); Upper gastrointestinal endoscopy (N/A, 05/31/2019); Upper gastrointestinal endoscopy (N/A, 08/05/2019); Upper gastrointestinal endoscopy (N/A, 08/23/2019); Abdomen surgery (1976); lumbar fusion (N/A, 02/10/2020); Cardiac catheterization; Cardiac catheterization (2005);  [place] tooth extraction; lumbar fusion (N/A, 06/17/2020); back surgery; cervical fusion (05/21/2021); and cervical fusion (N/A, 5/21/2021). Restrictions  Restrictions/Precautions  Restrictions/Precautions: General Precautions,Fall Risk  Required Braces or Orthoses?: No  Implants present? : Metal implants (cervical and lumbar surgeries, L wrist ORIF)  Position Activity Restriction  Other position/activity restrictions: up as tolerated; Pt on room air today  General  Chart Reviewed: Yes  Additional Pertinent Hx: TIA  Response To Previous Treatment: Patient with no complaints from previous session. Family / Caregiver Present: No  Referring Practitioner: Dr Flores. Subjective  Subjective: Pt mentions a much better night last night than the previous day, with no coughing episode and able to sleep through the night. Pt reports she is glad she got extra days here for therapy, feels she is ready to go home, reports decreased coughing spells. General Comment  Comments: Pt reports she has been off her o2 today, sao2 @ 97% on arrival at room air.   Pain Screening  Patient Currently in Pain: Yes  Pain Assessment  Pain Assessment: 0-10  Pain Level: 4  Pain Type: Chronic pain  Pain Location: Neck  Pain Orientation: Proximal  Pain Descriptors: Aching  Clinical Progression: Gradually improving  Response to Pain Intervention: Patient Satisfied  Vital Signs  BP Location: Right upper arm  Level of Consciousness: Alert (0)  Patient Currently in Pain: Yes  Oxygen Therapy  O2 Device: None (Room air)  Patient Observation  Observations: Pt on RA upon arrival        Orientation  Orientation  Overall Orientation Status: Within Normal Limits  Cognition      Objective      Transfers  Sit to Stand: Contact guard assistance;Stand by assistance (VC's for L LE positioning, nose over toes to stand up)  Stand to sit: Contact guard assistance (w/ RW + VC's for technique, pt at times flops back)  Bed to Chair: Contact guard assistance (w/ RW + VC's for technique)  Stand Pivot Transfers: Contact guard assistance (w/ RW + VC's for technique)  Comment: Hx of pushing backwards and relying on back of legs for support against sitting surface. Does much better when cued to scoot forward to edge of chair/bed, position her feet, and to bring her \"nose over her toes\" Pt reports she is used to using her lift chair at home.  educated in continuation of her HEP for strengthening to improve fucntion. Ambulation  Ambulation?: Yes  More Ambulation?: Yes  Ambulation 1  Surface: level tile  Device: Rolling Walker  Assistance: Stand by assistance  Quality of Gait: Slow tawanda, Lack of hip and knee flexion on LLE, minimizing step height. Continued cues to  LLE more and promote Heel-toe strike, with poor carryover. Slight fwd flexed head posture with cues to correct. WBOS noted, but no LOB. Pt does drift to the left, educated pt to stop every few steps and reallign  herself with the rolling walker. Gait Deviations: Slow Tawanda;Decreased step length;Decreased step height  Distance: 87ft (2MWT) ambulates total of 154 ft (2MWT)  Comments: Sao2 93% with exertion at room air  Ambulation 2  Surface - 2: level tile  Device 2: Rolling Walker  Assistance 2: Stand by assistance  Quality of Gait 2: Same as above  Gait Deviations: Slow Tawanad;Decreased step length;Decreased step height  Distance: 150 ft  Comments: Pt ambulates again as respiratory therapist comes to assess for home o2. Sao2 reminas > 90% on room air. Stairs/Curb  Stairs?: Yes  Stairs  # Steps : 10  Stairs Height:  (4\" & 6\")  Rails: Right ascending  Device: No Device  Assistance: Contact guard assistance  Comment: step-to pattern. Cues to remind pt, \"up with good, down with bad\" Pt also practises 4: step up at the door frame, supporting herself on door frame, CGA needed for safety-simulated for home entry.    Wheelchair Activities  Propulsion: Yes  Propulsion 1  Propulsion: Manual  Level: Level Tile  Method: RUE;LUE  Level of Assistance: Supervision  Description/ Details: Small propulsions. Distance limited by fatigue  Distance: 150 ft with 2 to 3 rest breaks in bewteen     Balance  Posture: Fair  Sitting - Static: Good  Sitting - Dynamic: Fair;+  Standing - Static: Good (RW)  Standing - Dynamic: Fair  Comments: Standing with RW. Other exercises  Other exercises?: Yes  Other exercises 1: Seated Ex: BLE x15 (Orange tband)  Other exercises 3: Standing Ex: BLE x10 (3way hip, Heel/toe, march, HS curl, mini squats,mini lunges)  Other exercises 4: sit to stand x 5 reps with B UE use-1 minute 3 secs CGA needed due to fatique                        G-Code     OutComes Score 2MWT- 87 ft with Rolling walker. Balance Score: 1 (03/24/22 1115)  Gait Score: 3 (03/24/22 1115)        Tinetti Total Score: 4 (03/24/22 1115)                                      AM-PAC Score             Goals  Short term goals  Time Frame for Short term goals: 1 week  Short term goal 1: Pt to demostrate bed mobility CGA/Jennifer. Short term goal 2: Pt to perform transfers CGA. Short term goal 3: Pt to amb 100'-150' with device, CGA. Short term goal 4: Pt to improve BLE strength by 1/2 MMG. Short term goal 5: Pt to improve standing balance to [facility]. Short term goal 6: Pt able to perform 4\" and 6\" steps x3 in //bars or acute stair way, with 2 B UE support, min A  Long term goals  Time Frame for Long term goals : By DC  Long term goal 1: Pt able to perform bed mobility at A  Long term goal 2: Pt able to transfer at supervsion level. Long term goal 3:  Pt able to ambulate house hold distances with assistive device mod-I  Long term goal 4:  Pt able to ambulate distance of 150 ft, level surfaces and shorter distance outside terraine at [facility]. Long term goal 5: Pt able to propel w/c on level surface, distance of 150 ft, supervsion level. Long term goal 6: Improve 2MWT distance to atleast 120 ft to improve overall fucntion.    Long term goal 7: Pt to improve Tinetti balance score to atlest 20/28 to reduce fall risk. Long term goal 8: Pt able to perform curb step with B UE support and/or able to go up and down 4 to 5 steps with 2 rails at min A   Patient Goals   Patient goals : To get stronger    Plan    Plan  Times per week: 1.5 hr/day, 5 to 7 days/week. Specific instructions for Next Treatment: transfers, amb, balance, standing program  Current Treatment Recommendations: Strengthening,Balance Training,Functional Mobility Training,Transfer Training,Endurance Training,Gait Training,Equipment Evaluation, Education, & procurement,Patient/Caregiver Education & Training,Safety Education & Training,Stair training,Wheelchair Mobility Training  Safety Devices  Type of devices:  All fall risk precautions in place,Gait belt,Left in chair,Call light within reach,Patient at risk for falls     Therapy Time   Individual Concurrent Group Co-treatment   Time In 0959         Time Out 1059         Minutes 60                 Yoseph Mendiola, PT

## 2022-04-11 NOTE — PROGRESS NOTES
Physical Therapy  Facility/Department: [facility]  Daily Treatment Note  NAME: Deisy Márquez  : 1950  MRN: [id number]    Date of Service: 2022    Discharge Recommendations:  Patient would benefit from continued therapy after discharge,Home with assist PRN        Assessment      PT Education: General Safety;Gait Training;Functional Mobility Training;Pressure Relief; Injury Prevention;Transfer Training; Adaptive Device Training;Energy Conservation;Home Exercise Program  Patient Education: Review of posture education (position during mobility and while resting or sleeping)     Patient Diagnosis(es): The primary encounter diagnosis was Cervical myelopathy ([address] Diagnoses of Depression, unspecified depression type, Cerebrovascular accident (CVA), unspecified mechanism ([address] Hyperlipidemia, unspecified hyperlipidemia type, RLS (restless legs syndrome), Essential hypertension, Localized swelling of both lower legs, Chronic diastolic heart failure ([address] and S/P cervical spinal fusion were also pertinent to this visit.      has a past medical history of Allergic rhinitis, cause unspecified, Back pain, Bowel obstruction (HCC), C. difficile diarrhea, CAD (coronary artery disease), Cardiac murmur, Cellulitis, Cellulitis, Cerebral artery occlusion with cerebral infarction ([address] COVID-19, Diverticulosis of colon (without mention of hemorrhage), GERD (gastroesophageal reflux disease), GERD (gastroesophageal reflux disease), History of blood transfusion, History of CHF (congestive heart failure), History of MI (myocardial infarction), History of ovarian cyst, History of peritonitis, HTN (hypertension), Hx of blood clots, Hyperlipidemia, Intestinal or peritoneal adhesions with obstruction (postoperative) (postinfection) ([address] Kidney infection, Lateral epicondylitis  of elbow, MDRO (multiple drug resistant organisms) resistance, Muscle strain, Other abnormal glucose, PONV (postoperative nausea and vomiting), Pre-diabetes, Restless legs syndrome (RLS), Snores, 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[address] TIA (transient ischemic attack), Uses walker, Vitamin D deficiency, Wears glasses, and Wellness examination. has a past surgical history that includes Ovary removal (1970); colectomy (6548); Appendectomy (1968); Ovary removal (1971); Hysterectomy (1973); Bunionectomy (Left); sinus surgery (2004); Colonoscopy; other surgical history (08/14/2014); cyst removal (Right); Wrist fracture surgery (Left, 03/05/2019); Upper gastrointestinal endoscopy (N/A, 05/31/2019); Upper gastrointestinal endoscopy (N/A, 08/05/2019); Upper gastrointestinal endoscopy (N/A, 08/23/2019); Abdomen surgery (1976); lumbar fusion (N/A, 02/10/2020); Cardiac catheterization; Cardiac catheterization (2005); [place] tooth extraction; lumbar fusion (N/A, 06/17/2020); back surgery; cervical fusion (05/21/2021); and cervical fusion (N/A, 5/21/2021). Restrictions  Restrictions/Precautions  Restrictions/Precautions: General Precautions,Fall Risk  Required Braces or Orthoses?: No  Implants present? : Metal implants (cervical and lumbar surgeries, L wrist ORIF)  Position Activity Restriction  Other position/activity restrictions: up as tolerated;     Subjective   General  Chart Reviewed: Yes  Additional Pertinent Hx: TIA  Response To Previous Treatment: Patient with no complaints from previous session. Family / Caregiver Present: No  Referring Practitioner: Dr Mayuri Viera. Subjective  Subjective: Pt reported ongoing neck pain. PTA enlarged the HEP that the PT printed for the pt ([email or web address]  access code: [email or web address]). Pt had no questions or concerns at this time. General Comment  Comments: Pt reports she has been off O2 and has been breathing fine, but still has a cough.   Pain Screening  Patient Currently in Pain: Yes  Pain Assessment  Pain Assessment: Faces  Stanley-Baker Pain Rating: Hurts little more  Pain Type: Chronic pain  Pain Location: Neck;Shoulder  Pain Orientation: Posterior; Left  Pain Onset: On-going  Non-Pharmaceutical Pain Intervention(s): Repositioned;Rest;Ambulation/Increased Activity; Distraction; Emotional support; Other (Comment) (posture education and practice)  Response to Pain Intervention: Patient Satisfied  Vital Signs  Patient Currently in Pain: Yes       Orientation  Orientation  Overall Orientation Status: Within Normal Limits     Objective   Bed mobility  Rolling to Left: Independent  Rolling to Right: Independent  Supine to Sit: Supervision  Sit to Supine: Supervision  Scooting: Independent  Comment: in hospital bed, pt has a hospital bed at home  Transfers  Sit to Stand: Supervision  Stand to sit: Supervision  Bed to Chair: Supervision  Stand Pivot Transfers: Supervision  Lateral Transfers: Supervision  Comment: uses RW      Goals  Short term goals  Time Frame for Short term goals: 1 week  Short term goal 1: Pt to demostrate bed mobility CGA/Jennifre. Short term goal 2: Pt to perform transfers CGA. Short term goal 3: Pt to amb 100'-150' with device, CGA. Short term goal 4: Pt to improve BLE strength by 1/2 MMG. Short term goal 5: Pt to improve standing balance to [facility]. Short term goal 6: Pt able to perform 4\" and 6\" steps x3 in //bars or acute stair way, with 2 B UE support, min A  Long term goals  Time Frame for Long term goals : By DC  Long term goal 1: Pt able to perform bed mobility at SBA  Long term goal 2: Pt able to transfer at supervsion level. Long term goal 3:  Pt able to ambulate house hold distances with assistive device mod-I  Long term goal 4:  Pt able to ambulate distance of 150 ft, level surfaces and shorter distance outside terraine at SBA. Long term goal 5: Pt able to propel w/c on level surface, distance of 150 ft, supervsion level. Long term goal 6: Improve 2MWT distance to atleast 120 ft to improve overall fucntion.    Long term goal 7: Pt to improve Tinetti balance score to atlest 20/28 to reduce fall risk.   Long term goal 8: Pt able to perform curb step with B UE support and/or able to go up and down 4 to 5 steps with 2 rails at min A   Patient Goals   Patient goals : To get stronger    Plan    Plan  Times per week: 1.5 hr/day, 5 to 7 days/week. Specific instructions for Next Treatment: transfers, amb, balance, standing program  Current Treatment Recommendations: Strengthening,Balance Training,Functional Mobility Training,Transfer Training,Endurance Training,Gait Training,Equipment Evaluation, Education, & procurement,Patient/Caregiver Education & Training,Safety Education & Training,Stair training,Wheelchair Mobility Training  Safety Devices  Type of devices:  All fall risk precautions in place,Gait belt,Left in chair,Call light within reach,Patient at risk for falls     Therapy Time     04/11/22 1435   PT Individual Minutes   Time In 1435   Time Out 1510   Minutes 35     Kely Guzman, PTA

## 2022-04-11 NOTE — PROGRESS NOTES
Home Oxygen Evaluation    Home Oxygen Evaluation completed. Patient is on room air  Resting SpO2 on room air = 97%    SpO2 on room air with exercise = 93%  SpO2 on oxygen as above with exercise = na%      Pt walked with PT approx. 100 ft. Nocturnal Oximetry with patient on room air is recommended is SpO2 is between 89% and 95% (requires additional order).       Romero Jeffries RCP  10:26 AM

## 2022-04-11 NOTE — PROGRESS NOTES
[facility] Internal Medicine    CONSULTATION / HISTORY AND PHYSICAL EXAMINATION            Date:   4/10/2022  Patient name:  Eliana Lorenzana  Date of admission:  3/23/2022  4:52 PM  MRN:   [id number]  Account:  [de-identified]  YOB: 1950  PCP:    Rebekah Wall MD  Room:   [facility]-  Code Status:    Full Code    Physician Requesting Consult: Jose Torres MD    Reason for Consult:  Medical management    Chief Complaint:     No chief complaint on file.   Debility    History Obtained From:     Patient, EMR, nursing staff    History of Present Illness:     29-year-old female initially presented to the multiple falls over several weeks in the setting of chronic cervical spine stenosis with myelopathy status post cervical fusion follows with neurosurgery  Recently started on Eliquis for acute DVT right leg  Trauma work-up CT brain in this admission unremarkable other than multiple remote and healing rib fractures, patient uses walker at home  Neurology review was obtained for worsening gait instability-MRI thoracic spine did show T2-T3 and T5-T7 moderate neural foraminal narrowing  Also patient was noted to have bilateral leg swelling with some stasis dermatitis, early cellulitis-started on diuresis as well as antibiotics  3/26   Patient feeling better  Feels that anxiety is better after starting buspar   Working with PT  3/28 ]  Patient complaining of cough, wheezing  She told the nurse that, cough is going on since she started lisinopril  Had wheezing, which improved with nebulization  3/29   Patient complaining of cough, shortness of breath, wheezing  Chest x-ray seen,  Had EKG, troponins which are flat  Past Medical History:     Past Medical History:   Diagnosis Date    Allergic rhinitis, cause unspecified     Back pain     lumbar    Bowel obstruction (HCC)     history of due to scar tissue, resolved non-surgically    C. difficile diarrhea     CAD (coronary artery disease)     no stent needed per pt.  Dr. Sasha Walters did cath at [facility]    Cardiac murmur     Cellulitis     left leg    Cellulitis 2017 August    leg left leg/bug bite    Cerebral artery occlusion with cerebral infarction [facility])     TIA 2014    COVID-19     ONE YR AGO IN 4/25/2020 fever and cough    Diverticulosis of colon (without mention of hemorrhage)     GERD (gastroesophageal reflux disease)     GERD (gastroesophageal reflux disease)     on rx    History of blood transfusion     approx 2020        History of CHF (congestive heart failure)     History of MI (myocardial infarction) 2005    thought due to a blood clot    History of ovarian cyst 1970    had oopherectomy holly    History of peritonitis 1968    due to ruptured appendix age 13    HTN (hypertension)     Hx of blood clots     right leg    Hyperlipidemia     Intestinal or peritoneal adhesions with obstruction (postoperative) (postinfection) ([address]     Kidney infection     renal failure/sepsis/spider bite    Lateral epicondylitis  of elbow     MDRO (multiple drug resistant organisms) resistance     c diff    Muscle strain     right posterior shoulder    Other abnormal glucose     PONV (postoperative nausea and vomiting)     dry heaves    Pre-diabetes     Restless legs syndrome (RLS)     Snores     no cpap    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)     TIA (transient ischemic attack) 2014    Uses walker     Vitamin D deficiency     Wears glasses     Wellness examination     last seen 2 weeks ago        Past Surgical History:     Past Surgical History:   Procedure Laterality Date    ABDOMEN SURGERY  1976    benign tumor removed near remaining ovary, 1.5 pounds    APPENDECTOMY  1968    appendix ruptured, developed peritonitis    BACK SURGERY      BUNIONECTOMY Left     along with calcium deposits removed   [address]  2005    negative    CERVICAL FUSION  05/21/2021 POSTERIOR C3-6 LAMINECTOMY, PARTIAL C7 LAMINECTOMY, FUSION C3-C6, SILVERCORD    CERVICAL FUSION N/A 5/21/2021    POSTERIOR C3-6 LAMINECTOMY, PARTIAL C7 LAMINECTOMY, FUSION C3-C6, SILVERCORD performed by Naveen Connolly DO at [address]    12 INCHES REMOVED D/T OBSTRUCTION    COLONOSCOPY      CYST REMOVAL Right     right facial    HYSTERECTOMY  1973    taken as a result of recurring cysts    LUMBAR FUSION N/A 02/10/2020    LUMBAR L4-5 POSTERIOR  DECOMPRESSION INSTRUMENTATION FUSION WCEMENT AUGMENTATION/ performed by Talia Pradhan MD at [facility] N/A 06/17/2020    L5-S1 PLIF L4-L5 REVISION performed by Talia Pradhan MD at [address] [facility] [address]  08/14/2014    FESS    OVARY REMOVAL  1970    UNILATERAL due to cyst    OVARY REMOVAL  1971    partial, due to cyst    SINUS SURGERY  2004    UPPER GASTROINTESTINAL ENDOSCOPY N/A 05/31/2019    EGD ESOPHAGOGASTRODUODENOSCOPY performed by Tamiko Ledbetter MD at [address] N/A 08/05/2019    EGD BIOPSY performed by Travis Villatoro MD at [address] [facility] N/A 08/23/2019    EGD BIOPSY performed by Tamiko Ledbetter MD at [address] [facility] 03/05/2019    WRIST OPEN REDUCTION INTERNAL FIXATION performed by Amairani Aparicio MD at [address]        Medications Prior to Admission:     Prior to Admission medications    Medication Sig Start Date End Date Taking? Authorizing Provider   traMADol (ULTRAM) 50 MG tablet Take 1 tablet by mouth every 12 hours as needed (severe pain) for up to 7 days.  4/7/22 4/14/22 Yes Vaughn Gomez MD   acetaminophen (TYLENOL) 325 MG tablet Take 2 tablets by mouth every 4 hours as needed for Pain 4/7/22  Yes Vaughn Gomez MD   busPIRone (BUSPAR) 5 MG tablet Take 1 tablet by mouth 3 times daily 4/7/22  Yes Vaughn Gomez MD   albuterol sulfate HFA (VENTOLIN HFA) 108 (90 Base) MCG/ACT inhaler Inhale 2 puffs into the lungs 4 times daily as needed for Wheezing or Shortness of Breath 4/7/22  Yes Mayco Johnson MD   apixaban (ELIQUIS) 5 MG TABS tablet Take 1 tablet by mouth 2 times daily 4/7/22  Yes Mayco Johnson MD   gabapentin (NEURONTIN) 100 MG capsule Take 2 capsules by mouth 2 times daily for 30 days. 4/7/22 5/7/22 Yes Mayco Johnson MD   citalopram (CELEXA) 20 MG tablet Take 1 tablet by mouth daily 4/7/22  Yes Mayco Johnson MD   atorvastatin (LIPITOR) 40 MG tablet Take 1 tablet by mouth nightly 4/7/22  Yes Mayco Johnson MD   fenofibrate micronized (LOFIBRA) 134 MG capsule Take 1 capsule by mouth every morning (before breakfast) 4/7/22  Yes Mayco Johnson MD   losartan (COZAAR) 100 MG tablet Take 1 tablet by mouth daily 4/8/22  Yes Mayco Johnson MD   pramipexole (MIRAPEX) 0.5 MG tablet Take 1 tablet by mouth nightly 4/7/22  Yes Mayco Johnson MD   carvedilol (COREG) 12.5 MG tablet Take 1 tablet by mouth 2 times daily 4/7/22  Yes Mayco Johnson MD   amLODIPine (NORVASC) 5 MG tablet Take 1 tablet by mouth daily 4/7/22  Yes Mayco Johnson MD   lidocaine 4 % external patch Place 1 patch onto the skin daily as needed (shoulder pain) Apply patch to shoulder. Do not place over chest/sternum. Patch may remain in place for up to 12 hours in any 24 hour period.  4/7/22  Yes Mayco Johnson MD   furosemide (LASIX) 20 MG tablet Take 1 tablet by mouth 2 times daily 4/7/22  Yes Mayco Johnson MD   melatonin 3 MG TABS tablet Take 2 tablets by mouth nightly as needed (insomnia) 4/7/22  Yes Mayco Johnson MD   nitroGLYCERIN (NITROSTAT) 0.4 MG SL tablet Place 1 tablet under the tongue every 5 minutes as needed for Chest pain 9/1/21   Kely Gastelum MD   docusate sodium (COLACE) 100 MG capsule Take 1 capsule by mouth 2 times daily as needed for Constipation 5/30/21   Nguyen Marley DO   cyanocobalamin ([employer] VITAMIN B12) 1000 MCG tablet Take 1 tablet by mouth daily 12/3/20   Wood Judah Flor MD   ferrous sulfate (IRON 325) 325 (65 Fe) MG tablet Take 1 tablet by mouth 2 times daily 7/2/20   Reid Mg MD   VITAMIN D, ERGOCALCIFEROL, PO Take by mouth    Historical Provider, MD   Lancets MISC 1 each by Does not apply route daily 10/10/19   Arlet Wilson MD   blood glucose monitor strips Test 2 times a day & as needed for symptoms of irregular blood glucose. 10/10/19   Arlet Wilson MD   Calcium Carbonate-Vitamin D (CALCIUM 500/D) 500-125 MG-UNIT TABS Take 1 tablet by mouth daily     Historical Provider, MD        Allergies:     Bactrim [sulfamethoxazole-trimethoprim], Codeine, and Seasonal    Social History:     Tobacco:    reports that she quit smoking about 4 years ago. Her smoking use included cigarettes. She started smoking about 26 years ago. She has a 10.00 pack-year smoking history. She has never used smokeless tobacco.  Alcohol:      reports no history of alcohol use. Drug Use:  reports no history of drug use. Family History:     Family History   Problem Relation Age of Onset    Stroke Mother     Diabetes Mother     Heart Disease Mother     High Blood Pressure Mother     Heart Disease Father     Heart Disease Brother     High Blood Pressure Brother     Heart Disease Maternal Grandmother     High Blood Pressure Sister        Review of Systems:     Positive and Negative as described in HPI. CONSTITUTIONAL:  negative for fevers, chills, sweats, fatigue, weight loss  HEENT:  negative for vision, hearing changes, runny nose, throat pain  RESPIRATORY: Positive for cough, shortness of breath, wheezing  CARDIOVASCULAR: Positive for chest, worse with cough.   GASTROINTESTINAL:  negative for nausea, vomiting, diarrhea, constipation, change in bowel habits, abdominal pain   GENITOURINARY:  negative for difficulty of urination, burning with urination, frequency   INTEGUMENT:  negative for rash, skin lesions, easy bruising   HEMATOLOGIC/LYMPHATIC:  negative for swelling/edema ALLERGIC/IMMUNOLOGIC:  negative for urticaria , itching  ENDOCRINE:  negative increase in drinking, increase in urination, hot or cold intolerance  MUSCULOSKELETAL:  negative joint pains, muscle aches, swelling of joints  NEUROLOGICAL:  negative for headaches, dizziness, lightheadedness, numbness, pain, tingling extremities      Physical Exam:     BP (!) 123/53   Pulse 79   Temp 98.2 °F (36.8 °C) (Oral)   Resp 18   Ht 5' 3\" (1.6 m)   Wt 181 lb (82.1 kg)   SpO2 96%   BMI 32.06 kg/m²   Temp (24hrs), Av.1 °F (36.7 °C), Min:97.9 °F (36.6 °C), Max:98.2 °F (36.8 °C)    No results for input(s): POCGLU in the last 72 hours. Intake/Output Summary (Last 24 hours) at 4/10/2022 2058  Last data filed at 4/10/2022 0606  Gross per 24 hour   Intake --   Output 950 ml   Net -950 ml       General Appearance:  alert, well appearing, and in no acute distress  Head:  normocephalic, atraumatic. Eye: no icterus, redness, pupils equal and reactive, extraocular eye movements intact, conjunctiva clear  Ear: normal external ear, no discharge, hearing intact  Nose:  no drainage noted  Mouth: mucous membranes moist  Neck: supple, no carotid bruits, thyroid not palpable  Lungs: Air entry but decreased, better wheezing present  Cardiovascular: normal rate, regular rhythm, no murmur, gallop, rub.   Abdomen: Soft, nontender, nondistended, normal bowel sounds, no hepatomegaly or splenomegaly  Neurologic: There are no new focal motor or sensory deficits, normal muscle tone and bulk, no abnormal sensation, normal speech, cranial nerves II through XII grossly intact  Skin: No gross lesions, rashes, bruising or bleeding on exposed skin area  Extremities:  peripheral pulses palpable, no pedal edema or calf pain with palpation  Psych: normal affect    Investigations:      Laboratory Testing:  Recent Results (from the past 24 hour(s))   Basic Metabolic Panel w/ Reflex to MG    Collection Time: 04/10/22  6:28 AM   Result Value Ref Range Glucose 124 (H) 70 - 99 mg/dL    BUN 43 (H) 8 - 23 mg/dL    CREATININE 1.04 (H) 0.50 - 0.90 mg/dL    Calcium 9.3 8.6 - 10.4 mg/dL    Sodium 141 135 - 144 mmol/L    Potassium 4.6 3.7 - 5.3 mmol/L    Chloride 107 98 - 107 mmol/L    CO2 24 20 - 31 mmol/L    Anion Gap 10 9 - 17 mmol/L    GFR Non-African American 52 (L) >60 mL/min    GFR African American >60 >60 mL/min    GFR Comment         Blood Occult Stool Screen #1    Collection Time: 04/10/22  9:50 AM   Result Value Ref Range    Occult Blood, Stool #1 NEGATIVE NEGATIVE    Date, Stool #1 [id number]     Time, Stool #1 UNKNOWN        Imaging/Diagonstics:  Recent data reviewed    Assessment :      Primary Problem  Cervical myelopathy ([facility]    Active Hospital Problems    Diagnosis Date Noted    Cervical myelopathy ([facility] [G95.9] 03/17/2022       Plan:     Multiple falls, gait instability, multiple remote and healing rib fractures leading to chest pain-needs PT OT  Neural foraminal narrowing of thoracic spine-neurology as reviewed-recommend rehab and physical therapy  Hypertension-continue amlodipine, Coreg  Right leg DVT-continue Eliquis  Chronic diastolic CHF-currently compensated-continue Coreg, Lipitor, Lasix, lisinopril  Depression -patient noted to be very tearful today-is already on maximal dose of  Celexa, will add BuSpar    DVT prophylaxis-patient on Eliquis    3/27   But has readings of low blood pressure, asymptomatic  Reducing dose of Norvasc to 5 from 10    3/28   Patient has chronic cough, started since she is put on ACE inhibitor  May have ACE inhibitor induced cough, will discontinue lisinopril, start patient on Cozaar  Started patient given nebulization  Has history of smoking  Chest x-ray seen   3/29   Suspecting symptoms are due to acute bronchitis,  Ordering Z-Han, prednisone  EKG seen, troponins are flat  Chest x-ray seen  We will monitor  4/3  Patient shortness of breath is getting better being on steroids  Oxygen requirement going down   We will try to wean oxygen, discussed with RN  Completed antibiotics and steroid    4/4  SOB, little better   On NC o2, 2L  Wean off o2  PT/OT   Labs and vitals reviewed       4/5  Multiple falls, gait instability,  Working with physical therapy,  Improving,  Chronic respiratory failure, on 2 L of oxygen, wean off oxygen,  Morbid obesity, low-calorie diet,  PT OT,  Labs and vitals reviewed,  Medications and their side effects reviewed    4/6  Multiple falls, gait instability,  Chronic respiratory failure with 2 to 3 L of oxygen, wean off oxygen,  Morbid obesity, low-calorie diet,  Labs, radiology, vitals, medications reviewed,  Continue physical therapy,    4/7  Thoracic spine narrowing , LE weakness,   Multiple falls, gait instability,  Cr worsening 1.3->1.68, will hold lasix, losartan, recheck BMP next am,   May start fluids if cr didn't get better   Echo reviewed from 2018,   EF 24%, grade 1 Diastolic dyfn      Chronic respiratory failure with 2 to 3 L of oxygen, wean off oxygen,  Morbid obesity, low-calorie diet,  Labs, radiology, vitals, medications reviewed,  Continue physical therapy,      Shortness of breath and wheezing,  Chest x-ray stat,  History of for diastolic heart failure in the past, last echo was done in 2018, showed ejection fraction 65% with grade 1 diastolic dysfunction,  Creatinine was 1.68,1.56, improved a little,  Blood pressure high, restarted losartan,  Ct chest ordered and rev no chf no pneumonia  Oral prednisone for copd   Restart low dose lasix on dc  Bmp in a week  Ok to dc in am    Consultations:     IP CONSULT TO DIETITIAN  IP CONSULT TO SOCIAL WORK  IP CONSULT TO Hank Leung MD  4/10/2022  8:58 PM    Copy sent to Dr. Maria Del Carmen Farfan MD    Please note that this chart was generated using voice recognition Dragon dictation software. Although every effort was made to ensure the accuracy of this automated transcription, some errors in transcription may have occurred.

## 2022-04-11 NOTE — PLAN OF CARE
Problem: Skin Integrity:  Goal: Will show no infection signs and symptoms  Description: Will show no infection signs and symptoms  4/11/2022 0403 by Alberto Sesay RN  Outcome: Met This Shift     Problem: Skin Integrity:  Goal: Absence of new skin breakdown  Description: Absence of new skin breakdown  4/11/2022 0403 by Alberto Sesay RN  Outcome: Met This Shift     Problem: Skin Integrity:  Goal: Risk for impaired skin integrity will decrease  Description: Risk for impaired skin integrity will decrease  Outcome: Met This Shift     Problem: Mobility - Impaired:  Goal: Mobility will improve  Description: Mobility will improve  Outcome: Ongoing

## 2022-04-11 NOTE — PROGRESS NOTES
[facility]              Date:   4/11/2022  Patient name:  Chino Tee  Date of admission:  3/23/2022  4:52 PM  MRN:   [id number]  Account:  [de-identified]  YOB: 1950  PCP:    Marcy Duverney, MD  Room:   [facility]-  Code Status:    Full Code    Physician Requesting Consult: Flavia Bravo MD    Reason for Consult:  Medical management    Chief Complaint:     No chief complaint on file.   Debility    History Obtained From:     Patient, EMR, nursing staff    History of Present Illness:     4/11/22    Nocturnal oxygen evaluation to be done tonight   Medications reviewed  And adjusted for discharge  Discussed with Dr. Lake Pineda  Discharge is planned for tomorrow            78-year-old female initially presented to the multiple falls over several weeks in the setting of chronic cervical spine stenosis with myelopathy status post cervical fusion follows with neurosurgery  Recently started on Eliquis for acute DVT right leg  Trauma work-up CT brain in this admission unremarkable other than multiple remote and healing rib fractures, patient uses walker at home  Neurology review was obtained for worsening gait instability-MRI thoracic spine did show T2-T3 and T5-T7 moderate neural foraminal narrowing  Also patient was noted to have bilateral leg swelling with some stasis dermatitis, early cellulitis-started on diuresis as well as antibiotics  3/26   Patient feeling better  Feels that anxiety is better after starting buspar   Working with PT  3/28 ]  Patient complaining of cough, wheezing  She told the nurse that, cough is going on since she started lisinopril  Had wheezing, which improved with nebulization  3/29   Patient complaining of cough, shortness of breath, wheezing  Chest x-ray seen,  Had EKG, troponins which are flat  Past Medical History:     Past Medical History:   Diagnosis Date    Allergic rhinitis, cause unspecified     Back pain     lumbar    Bowel obstruction (HCC)     history of due to scar tissue, resolved non-surgically    C. difficile diarrhea     CAD (coronary artery disease)     no stent needed per pt.  Dr. Darci Beasley did cath at [facility]    Cardiac murmur     Cellulitis     left leg    Cellulitis 2017 August    leg left leg/bug bite    Cerebral artery occlusion with cerebral infarction [facility])     TIA 2014    COVID-19     ONE YR AGO IN 4/25/2020 fever and cough    Diverticulosis of colon (without mention of hemorrhage)     GERD (gastroesophageal reflux disease)     GERD (gastroesophageal reflux disease)     on rx    History of blood transfusion     approx 2020        History of CHF (congestive heart failure)     History of MI (myocardial infarction) 2005    thought due to a blood clot    History of ovarian cyst 1970    had oopherectomy holly    History of peritonitis 1968    due to ruptured appendix age 13    HTN (hypertension)     Hx of blood clots     right leg    Hyperlipidemia     Intestinal or peritoneal adhesions with obstruction (postoperative) (postinfection) ([facility] [address]     Kidney infection     renal failure/sepsis/spider bite    Lateral epicondylitis  of elbow     MDRO (multiple drug resistant organisms) resistance     c diff    Muscle strain     right posterior shoulder    Other abnormal glucose     PONV (postoperative nausea and vomiting)     dry heaves    Pre-diabetes     Restless legs syndrome (RLS)     Snores     no cpap    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)     TIA (transient ischemic attack) 2014    Uses walker     Vitamin D deficiency     Wears glasses     Wellness examination     last seen 2 weeks ago        Past Surgical History:     Past Surgical History:   Procedure Laterality Date    ABDOMEN SURGERY  1976    benign tumor removed near remaining ovary, 1.5 pounds    APPENDECTOMY  1968    appendix ruptured, developed peritonitis    BACK SURGERY      BUNIONECTOMY Left     along with calcium deposits removed   [address]  2005    negative    CERVICAL FUSION  05/21/2021    POSTERIOR C3-6 LAMINECTOMY, PARTIAL C7 LAMINECTOMY, FUSION C3-C6, SILVERCORD    CERVICAL FUSION N/A 5/21/2021    POSTERIOR C3-6 LAMINECTOMY, PARTIAL C7 LAMINECTOMY, FUSION C3-C6, SILVERCORD performed by Solitario Ridley DO at [address]    12 INCHES REMOVED D/T OBSTRUCTION    COLONOSCOPY      CYST REMOVAL Right     right facial    HYSTERECTOMY  1973    taken as a result of recurring cysts    LUMBAR FUSION N/A 02/10/2020    LUMBAR L4-5 POSTERIOR  DECOMPRESSION INSTRUMENTATION FUSION WCEMENT AUGMENTATION/ performed by Sal Goodwin MD at [facility] N/A 06/17/2020    L5-S1 PLIF L4-L5 REVISION performed by Sal Goodwin MD at [facility]  08/14/2014    FESS    OVARY REMOVAL  1970    UNILATERAL due to cyst    OVARY REMOVAL  1971    partial, due to cyst    SINUS SURGERY  2004    UPPER GASTROINTESTINAL ENDOSCOPY N/A 05/31/2019    EGD ESOPHAGOGASTRODUODENOSCOPY performed by Caro Khan MD at [address] N/A 08/05/2019    EGD BIOPSY performed by Balaji Pascual MD at [address] N/A 08/23/2019    EGD BIOPSY performed by Caro Khan MD at [address] [facility] 03/05/2019    WRIST OPEN REDUCTION INTERNAL FIXATION performed by Carla Corbett MD at [address]        Medications Prior to Admission:     Prior to Admission medications    Medication Sig Start Date End Date Taking? Authorizing Provider   traMADol (ULTRAM) 50 MG tablet Take 1 tablet by mouth every 12 hours as needed (severe pain) for up to 5 days.  4/10/22 4/15/22 Yes Monique Garcia MD   busPIRone (BUSPAR) 5 MG tablet Take 1 tablet by mouth 3 times daily 4/10/22  Yes Monique Garcia MD   albuterol-ipratropium (COMBIVENT RESPIMAT)  MCG/ACT AERS inhaler Inhale 1 puff into the lungs every 6 hours as needed for Wheezing or Shortness of Breath 4/10/22  Yes Vaughn Gomez MD   apixaban (ELIQUIS) 5 MG TABS tablet Take 1 tablet by mouth 2 times daily 4/10/22  Yes Vaughn Gomez MD   gabapentin (NEURONTIN) 100 MG capsule Take 2 capsules by mouth 2 times daily for 30 days. 4/10/22 5/10/22 Yes Vaughn Gomez MD   citalopram (CELEXA) 20 MG tablet Take 1 tablet by mouth daily 4/10/22  Yes Vaughn Gomez MD   atorvastatin (LIPITOR) 40 MG tablet Take 1 tablet by mouth nightly 4/10/22  Yes Vaughn Gomez MD   fenofibrate micronized (LOFIBRA) 134 MG capsule Take 1 capsule by mouth every morning (before breakfast) 4/10/22  Yes Vaughn Goemz MD   losartan (COZAAR) 100 MG tablet Take 1 tablet by mouth daily 4/10/22  Yes Vaughn Gomez MD   pramipexole (MIRAPEX) 0.5 MG tablet Take 1 tablet by mouth nightly 4/10/22  Yes Vaughn Gomez MD   carvedilol (COREG) 12.5 MG tablet Take 1 tablet by mouth 2 times daily 4/10/22  Yes Vaughn Gomez MD   amLODIPine (NORVASC) 5 MG tablet Take 1 tablet by mouth daily 4/10/22  Yes Vaughn Gomez MD   predniSONE (DELTASONE) 20 MG tablet Take 2 tablets by mouth daily for 1 dose 4/11/22 4/12/22 Yes Vaughn Gomez MD   benzonatate (TESSALON) 100 MG capsule Take 1 capsule by mouth 3 times daily as needed for Cough 4/10/22 4/17/22 Yes Vaughn Gomez MD   acetaminophen (TYLENOL) 325 MG tablet Take 2 tablets by mouth every 4 hours as needed for Pain 4/7/22  Yes Vaughn Gomez MD   lidocaine 4 % external patch Place 1 patch onto the skin daily as needed (shoulder pain) Apply patch to shoulder. Do not place over chest/sternum. Patch may remain in place for up to 12 hours in any 24 hour period.  4/7/22  Yes Vaughn Gomez MD   furosemide (LASIX) 20 MG tablet Take 1 tablet by mouth 2 times daily 4/7/22  Yes Vaughn Gomez MD   melatonin 3 MG TABS tablet Take 2 tablets by mouth nightly as needed (insomnia) 4/7/22  Yes Umesh Salter MD   nitroGLYCERIN (NITROSTAT) 0.4 MG SL tablet Place 1 tablet under the tongue every 5 minutes as needed for Chest pain 9/1/21   Tracy Malik MD   docusate sodium (COLACE) 100 MG capsule Take 1 capsule by mouth 2 times daily as needed for Constipation 5/30/21   Billie Forward Marley, DO   cyanocobalamin ([employer] VITAMIN B12) 1000 MCG tablet Take 1 tablet by mouth daily 12/3/20   Tracy Malik MD   ferrous sulfate (IRON 325) 325 (65 Fe) MG tablet Take 1 tablet by mouth 2 times daily 7/2/20   Yadira Christopher MD   VITAMIN D, ERGOCALCIFEROL, PO Take by mouth    Historical Provider, MD   Lancets MISC 1 each by Does not apply route daily 10/10/19   Miguel Harmon MD   blood glucose monitor strips Test 2 times a day & as needed for symptoms of irregular blood glucose. 10/10/19   Miguel Harmon MD   Calcium Carbonate-Vitamin D (CALCIUM 500/D) 500-125 MG-UNIT TABS Take 1 tablet by mouth daily     Historical Provider, MD        Allergies:     Bactrim [sulfamethoxazole-trimethoprim], Codeine, and Seasonal    Social History:     Tobacco:    reports that she quit smoking about 4 years ago. Her smoking use included cigarettes. She started smoking about 26 years ago. She has a 10.00 pack-year smoking history. She has never used smokeless tobacco.  Alcohol:      reports no history of alcohol use. Drug Use:  reports no history of drug use. Family History:     Family History   Problem Relation Age of Onset    Stroke Mother     Diabetes Mother     Heart Disease Mother     High Blood Pressure Mother     Heart Disease Father     Heart Disease Brother     High Blood Pressure Brother     Heart Disease Maternal Grandmother     High Blood Pressure Sister        Review of Systems:     Positive and Negative as described in HPI.     CONSTITUTIONAL:  negative for fevers, chills, sweats, fatigue, weight loss  HEENT:  negative for vision, hearing changes, runny nose, throat pain  RESPIRATORY: Positive for cough, shortness of breath, wheezing  CARDIOVASCULAR: Positive for chest, worse with cough. GASTROINTESTINAL:  negative for nausea, vomiting, diarrhea, constipation, change in bowel habits, abdominal pain   GENITOURINARY:  negative for difficulty of urination, burning with urination, frequency   INTEGUMENT:  negative for rash, skin lesions, easy bruising   HEMATOLOGIC/LYMPHATIC:  negative for swelling/edema   ALLERGIC/IMMUNOLOGIC:  negative for urticaria , itching  ENDOCRINE:  negative increase in drinking, increase in urination, hot or cold intolerance  MUSCULOSKELETAL:  negative joint pains, muscle aches, swelling of joints  NEUROLOGICAL:  negative for headaches, dizziness, lightheadedness, numbness, pain, tingling extremities      Physical Exam:     BP (!) 160/74   Pulse 61   Temp 97.9 °F (36.6 °C)   Resp 20   Ht 5' 3\" (1.6 m)   Wt 181 lb (82.1 kg)   SpO2 96%   BMI 32.06 kg/m²   Temp (24hrs), Av.1 °F (36.7 °C), Min:97.9 °F (36.6 °C), Max:98.2 °F (36.8 °C)    No results for input(s): POCGLU in the last 72 hours. No intake or output data in the 24 hours ending 22 1127    General Appearance:  alert, well appearing, and in no acute distress  Head:  normocephalic, atraumatic. Eye: no icterus, redness, pupils equal and reactive, extraocular eye movements intact, conjunctiva clear  Ear: normal external ear, no discharge, hearing intact  Nose:  no drainage noted  Mouth: mucous membranes moist  Neck: supple, no carotid bruits, thyroid not palpable  Lungs: Air entry but decreased, better wheezing present  Cardiovascular: normal rate, regular rhythm, no murmur, gallop, rub.   Abdomen: Soft, nontender, nondistended, normal bowel sounds, no hepatomegaly or splenomegaly  Neurologic: There are no new focal motor or sensory deficits, normal muscle tone and bulk, no abnormal sensation, normal speech, cranial nerves II through XII grossly intact  Skin: No gross lesions, rashes, bruising or bleeding on exposed skin area  Extremities:  peripheral pulses palpable, no pedal edema or calf pain with palpation  Psych: normal affect    Investigations:      Laboratory Testing:  Recent Results (from the past 24 hour(s))   Basic Metabolic Panel w/ Reflex to MG    Collection Time: 04/11/22  7:38 AM   Result Value Ref Range    Glucose 137 (H) 70 - 99 mg/dL    BUN 36 (H) 8 - 23 mg/dL    CREATININE 1.07 (H) 0.50 - 0.90 mg/dL    Calcium 9.0 8.6 - 10.4 mg/dL    Sodium 140 135 - 144 mmol/L    Potassium 4.1 3.7 - 5.3 mmol/L    Chloride 105 98 - 107 mmol/L    CO2 24 20 - 31 mmol/L    Anion Gap 11 9 - 17 mmol/L    GFR Non-African American 51 (L) >60 mL/min    GFR African American >60 >60 mL/min    GFR Comment             Imaging/Diagonstics:  Recent data reviewed    Assessment :      Primary Problem  Cervical myelopathy ([facility] [address]    Active Hospital Problems    Diagnosis Date Noted    Cervical myelopathy ([facility] [address] [G95.9] 03/17/2022       Plan:     Multiple falls, gait instability, multiple remote and healing rib fractures leading to chest pain-needs PT OT  Neural foraminal narrowing of thoracic spine-neurology as reviewed-recommend rehab and physical therapy  Hypertension-continue amlodipine, Coreg  Right leg DVT-continue Eliquis  Chronic diastolic CHF-currently compensated-continue Coreg, Lipitor, Lasix, lisinopril  Depression -patient noted to be very tearful today-is already on maximal dose of  Celexa, will add BuSpar    DVT prophylaxis-patient on Eliquis    3/27   But has readings of low blood pressure, asymptomatic  Reducing dose of Norvasc to 5 from 10    3/28   Patient has chronic cough, started since she is put on ACE inhibitor  May have ACE inhibitor induced cough, will discontinue lisinopril, start patient on Cozaar  Started patient given nebulization  Has history of smoking  Chest x-ray seen   3/29   Suspecting symptoms are due to acute bronchitis,  Ordering Z-Han, prednisone  EKG seen, troponins are flat  Chest x-ray seen  We will monitor  4/3  Patient shortness of breath is getting better being on steroids  Oxygen requirement going down   We will try to wean oxygen, discussed with RN  Completed antibiotics and steroid    4/4  SOB, little better   On NC o2, 2L  Wean off o2  PT/OT   Labs and vitals reviewed       4/5  Multiple falls, gait instability,  Working with physical therapy,  Improving,  Chronic respiratory failure, on 2 L of oxygen, wean off oxygen,  Morbid obesity, low-calorie diet,  PT OT,  Labs and vitals reviewed,  Medications and their side effects reviewed    4/6  Multiple falls, gait instability,  Chronic respiratory failure with 2 to 3 L of oxygen, wean off oxygen,  Morbid obesity, low-calorie diet,  Labs, radiology, vitals, medications reviewed,  Continue physical therapy,    4/7  Thoracic spine narrowing , LE weakness,   Multiple falls, gait instability,  Cr worsening 1.3->1.68, will hold lasix, losartan, recheck BMP next am,   May start fluids if cr didn't get better   Echo reviewed from 2018,   EF 85%, grade 1 Diastolic dyfn      Chronic respiratory failure with 2 to 3 L of oxygen, wean off oxygen,  Morbid obesity, low-calorie diet,  Labs, radiology, vitals, medications reviewed,  Continue physical therapy,      Shortness of breath and wheezing,  Chest x-ray stat,  History of for diastolic heart failure in the past, last echo was done in 2018, showed ejection fraction 65% with grade 1 diastolic dysfunction,  Creatinine was 1.68,1.56, improved a little,  Blood pressure high, restarted losartan,  Ct chest ordered and rev no chf no pneumonia  Oral prednisone for copd   Restart low dose lasix on dc  Bmp in a week  Ok to dc in am    4/11/22    Discharge med reconcilliation reviewed . Will continue lasix 20 mg daily . Was on bid prior to being held .     Noct o2 eval tonight          Consultations:     IP CONSULT TO DIETITIAN  IP CONSULT TO SOCIAL WORK  IP CONSULT TO INTERNAL MEDICINE  IP CONSULT TO RESPIRATORY CARE      Jean ROLLINS Kailyn Ramirez MD  4/11/2022  11:27 AM    Copy sent to Dr. Yanira Bueno MD    Please note that this chart was generated using voice recognition Dragon dictation software. Although every effort was made to ensure the accuracy of this automated transcription, some errors in transcription may have occurred.

## 2022-04-11 NOTE — PROGRESS NOTES
[address]    ACUTE REHABILITATION OCCUPATIONAL THERAPY  DAILY NOTE    Date: 22  Patient Name: Danii Burgos      Room: 7963/0089-10    MRN: [id number]   : 1950  (71 y.o.)  Gender: female   Referring Practitioner: Lazarus Carrillo MD  Diagnosis: Cervical myelopathy  Additional Pertinent Hx: 71 y.o.  female, with a history of anemia, spondylolisthesis, chronic DVT, chronic diastolic heart failure, CVA, major depressive disorder, s/p cervical spine fusion, stenosis of cervical spine with myelopathy, and DM type II, who presents with leg pain, shortness of breath, fall, and neck pain. According to patient and her , patient has had multiple falls recently including a fall Monday and evening and again on Tuesday. Patient reports that today she felt extremely weak upon waking. Restrictions  Restrictions/Precautions: General Precautions,Fall Risk  Implants present? : Metal implants (cervical and lumbar surgeries, L wrist ORIF)  Other position/activity restrictions: up as tolerated; O2 3L/m via NC  Required Braces or Orthoses?: No  Equipment Used: Other (RW)    Subjective  Subjective: \"Dr. Francisco Dang told me I'll be leaving tomorrow. Comments:    Patient Currently in Pain: Yes  Pain Level: 5  Pain Location: Neck  Pain Orientation: Posterior  Restrictions/Precautions: General Precautions; Fall Risk  Overall Orientation Status: Within Functional Limits  Patient Observation  Observations: Pt on RA upon arrival   Pain Assessment  Pain Assessment: 0-10  Pain Level: 5  Pain Type: Acute pain  Pain Location: Neck  Pain Orientation: Posterior    Objective  Cognition  Overall Cognitive Status: WFL  Perception  Overall Perceptual Status: WFL  Balance  Sitting Balance: Modified independent   Standing Balance: Stand by assistance  Transfers  Sit to stand: Stand by assistance  Stand to sit: Stand by assistance  Transfer Comments: verbal cues for hand placement and safety  Standing Balance  Time: AM: <1 min x2, 1-2 min x2, 2-3 mins;PM: 10-11 mins, 5-6 mins  Activity: ADLs and fucntional transfers, functional mobility, dynamic standing   Comment: with RW and sink for 0-1 UE support for balance maintenence, no LOB noted  Functional Mobility  Functional - Mobility Device: Rolling Walker  Activity: To/From therapy gym  Assist Level: Stand by assistance  Functional Mobility Comments: Functional mobility elicited from therapy gym to room this date to support tolerance and endurance needed to engage in ADLs, IADLs,  and functional mobility/transfers safely and independently. No LOB noted   Fine Motor: QUINONES facilitated pt engagement in [facility] tasks this date to support independence and safety during ADLs and IADLs. Pt was instructed to pocket metal pegs into palm and manipulate pegs from palm to index finger and thumb prior to placing pegs into peg board alternaiing BUEs. Pt displayed increased difficulty with L hand dropping pegs x4. Pt also was instructed to sort  and flip cards by color and suites to support [facility] skills needed for dailly tasks        Additional Activities: QUINOENS facilitated pt engagement in dynamic standing activity, corn hole, to support support ability to self correct should balance be lost, tolerance and endurance needed to engage in ADLs, IADLs, and functional mobility/transfers. Pt utilized RW for 0-1 UE support for balance maintenence. Pt able to reach within and outside KAREEM with no LOB noted. ADL  Equipment Provided: Reacher  Grooming: None  UE Bathing: None  LE Bathing: Stand by assistance (SBA for standing, completes brice care only )  UE Dressing: None  LE Dressing: Minimal  assistance;Verbal cueing (A for TEDS  and placing RLE into underwear, able to don LLE in underwear and BLEs in pants with increased time )  Toileting: None  Additional Comments: pt declines UE dressing/bathing/ and grooming tasks this date. Pt reports completing UE tasks prior to OT tx. During LE dressing pt req vc for underwear orientation and req assist R foot into underwear this date           Assessment  Performance deficits / Impairments: Decreased functional mobility ; Decreased ADL status; Decreased strength;Decreased endurance;Decreased sensation;Decreased balance;Decreased high-level IADLs;Decreased fine motor control;Decreased coordination  Prognosis: Good  Discharge Recommendations: 24 hour supervision or assist;Home with Home health OT  Activity Tolerance: Patient Tolerated treatment well  Safety Devices in place: Yes  Type of devices: Left in chair;Call light within reach; Patient at risk for falls  Restraints  Initially in place: No            Patient Goals   Patient goals : \"To be able to put my pants/socks on better\"  Learner:patient  Method: demonstration and explanation       Outcome: needs reinforcement        Plan  Plan  Times per week: 5-7  Times per day: Twice a day  Current Treatment Recommendations: Balance Training,Functional Mobility Training,Endurance Training,Strengthening,ROM,Safety Education & Training,Patient/Caregiver Education & Training,Equipment Evaluation, Education, & procurement,Self-Care / ADL,Pain Management  Patient Goals   Patient goals :  \"To be able to put my pants/socks on better\"  Short term goals  Time Frame for Short term goals: 1 week   Short term goal 1: Pt will complete LB dressing/bathing/toileting CGA with Good safety with use of AE/DME/modified techniques for increased IND with self care  Short term goal 2: Pt will participate in 30+ minutes functional activity for increased strength/balance/endurance for increased IND in ADL's  Short term goal 3: Pt will complete transfers/functional mobility CGA with Good safety and RW for increased IND in ADL's  Short term goal 4: Pt will verbalize/demonstrate Good understanding of AE/DME/modified techniques for increased IND in self care  Short term goal 5: Pt will complete UB bathing/dressing/grooming with Supervision for increased IND in self care  Long term goals  Time Frame for Long term goals : by discharge   Long term goal 1: Pt will complete toileting/grooming/dressing Mod I and bathing with Supervison with Good safety with use of AE/DME/modified techniques for increased IND with self care  Long term goal 2: Pt will complete functional mobility/transferes during functional activity Mod I with Good safety and RW for increased IND in ADL's  Long term goal 3: Pt will verbalize/demonstrate Good understanding of fall prevention strategies for increased safety/IND in self care  Long term goal 4: Pt will improve L hand strength for self care as evidence by a 3# improvement in dynomometor testing   Long term goal 5: Pt will improve L FMC as evidence by a 20 second improvement on 9 hole peg test for increased IND with self care        04/11/22 1101 04/11/22 1333   OT Individual Minutes   Time In 1101 1333   Time Out 1201 1405   Minutes 60 32     Electronically signed by SHWETA Ceballos on 4/11/22 at 3:48 PM EDT

## 2022-04-11 NOTE — PROGRESS NOTES
Physical Medicine & Rehabilitation  Progress Note    4/11/2022 10:21 AM     CC: Ambulatory and ADL dysfunction due to cervical myelopathy    Subjective:   Feels well. Continues on 2 L oxygen. Looking forward to discharge. ROS:  Denies fevers, chills, sweats. No chest pain, palpitations, lightheadedness. Denies coughing, wheezing or shortness of breath. Denies abdominal pain, nausea, diarrhea or constipation. No new areas of joint pain. Denies new areas of numbness or weakness. Denies new anxiety or depression issues. No new skin problems. Rehabilitation:   PT:  Restrictions/Precautions: General Precautions,Fall Risk  Implants present? : Metal implants (cervical and lumbar surgeries, L wrist ORIF)  Other position/activity restrictions: up as tolerated; O2 3L/m via NC   Transfers  Sit to Stand: Contact guard assistance (w/ RW + VC's for technique)  Stand to sit: Contact guard assistance (w/ RW + VC's for technique)  Bed to Chair: Contact guard assistance,Minimal assistance (w/ RW + VC's for technique)  Stand Pivot Transfers: Contact guard assistance,Minimal Assistance (w/ RW + VC's for technique)  Comment: Hx of pushing backwards and relying on back of legs for support against sitting surface. Does much better when cued to scoot forward to edge of chair/bed, position her feet, and to bring her \"nose over her toes\"  Ambulation 1  Surface: level tile  Device: Rolling Walker  Other Apparatus: Wheelchair follow  Assistance: Contact guard assistance  Quality of Gait: Slow tawanda, Lack of hip and knee flexion on LLE, minimizing step height. Continued cues to  LLE more and promote Heel-toe strike, with poor carryover. Slight fwd flexed posture with cues to correct  Gait Deviations: Slow Tawanda,Decreased step length,Decreased step height  Distance: 202'  Comments: 2MWT 76'. Seated rest at 104' before finishing ambulation.           OT:  ADL  Equipment Provided: Reacher,Long-handled sponge  Feeding: Modified independent  (Per pt report)  Grooming: Modified independent  (from w/c level)  UE Bathing: Stand by assistance  LE Bathing: Stand by assistance  UE Dressing: Stand by assistance  LE Dressing: Stand by assistance (TA for TEDs. SBA for all everything else)  Toileting: Stand by assistance  Additional Comments: QUINONES facilitated pts engagement in full shower on this date with use of tub bench, grab bars, hand held shower head, and LHS. Completes UB dressing while sitting on bench, them ambulates to toilet for LB per home setup. Demos with good use of reacher for threading BLEs. Then stands at sink for grooming. Instrumental ADL's  Instrumental ADLs: Yes     Balance  Sitting Balance: Modified independent   Standing Balance: Stand by assistance   Standing Balance  Time: AM: 1 min, 15 min  Activity: ADLs, functional mobility   Comment: with RW  Functional Mobility  Functional - Mobility Device: Rolling Walker  Activity: Retrieve items,Transport items,To/from bathroom  Assist Level: Stand by assistance  Functional Mobility Comments: with minimal verbal cues for safety     Bed mobility  Rolling to Left: Stand by assistance  Rolling to Right: Stand by assistance  Supine to Sit: Contact guard assistance  Sit to Supine: Contact guard assistance  Scooting: Contact guard assistance  Comment: bed mobility on mat table  Transfers  Stand Pivot Transfers: Stand by assistance  Sit to stand: Stand by assistance  Stand to sit: Stand by assistance  Transfer Comments: verbal cues for hand placement and safety   Toilet Transfers  Toilet - Technique: Ambulating  Equipment Used: Grab bars  Toilet Transfer: Stand by assistance  Toilet Transfers Comments: with RW     Shower Transfers  Shower - Transfer From: Walker  Shower - Transfer Type: To and From  Shower - Transfer To:  Transfer tub bench  Shower - Technique: Ambulating  Shower Transfers: Stand by [employer] (SBA entering, CGA exiting)  Shower Transfers Comments: Verbal cues for hand placement and safety over ramp  Wheelchair Bed Transfers  Wheelchair/Bed - Technique: Stand pivot  Equipment Used: Other (RW)  Level of Asssistance: Moderate assistance  Wheelchair Transfers Comments: with RW; Blocking of L foot due to slipping      ST:            Objective:  BP (!) 160/74   Pulse 61   Temp 97.9 °F (36.6 °C)   Resp 20   Ht 5' 3\" (1.6 m)   Wt 181 lb (82.1 kg)   SpO2 96%   BMI 32.06 kg/m²  I Body mass index is 32.06 kg/m². I   Wt Readings from Last 1 Encounters:   22 181 lb (82.1 kg)      Temp (24hrs), Av.1 °F (36.7 °C), Min:97.9 °F (36.6 °C), Max:98.2 °F (36.8 °C)         GEN: well developed, well nourished, no acute distress  HEENT: Normocephalic atraumatic, EOMI, mucous membranes pink and moist  CV: RRR, no murmurs, rubs or gallops  PULM: CTAB, no rales or rhonchi. Respirations WNL and unlabored  ABD: soft, NT, ND, +BS and equal  NEURO: A&O x3. Sensation intact to light touch. Year, president location, follows command  MSK:/5 upper and lower extremities  EXTREMITIES: No calf tenderness to palpation bilaterally. No edema BLEs  SKIN: warm dry and intact with good turgor  PSYCH: appropriately interactive. Affect WNL.   Good spirits        Medications   Scheduled Meds:   predniSONE  40 mg Oral Daily    ipratropium-albuterol  1 ampule Inhalation 4x daily    losartan  100 mg Oral Daily    amLODIPine  5 mg Oral Daily    gabapentin  200 mg Oral BID    lidocaine  1 patch TransDERmal Daily    busPIRone  5 mg Oral TID    apixaban  5 mg Oral BID    atorvastatin  40 mg Oral Nightly    calcium carbonate w/vitamin D  1 tablet Oral Daily    carvedilol  12.5 mg Oral BID    citalopram  20 mg Oral Daily    fenofibrate  160 mg Oral Daily    ferrous sulfate  325 mg Oral BID    [Held by provider] furosemide  20 mg Oral BID    pramipexole  0.5 mg Oral Nightly    cyanocobalamin  1,000 mcg Oral Daily    polyethylene glycol  17 g Oral Daily     Continuous Infusions:  PRN Meds:.benzonatate, albuterol, perflutren lipid microspheres, Benzocaine-Menthol, dextromethorphan-guaiFENesin, hydrOXYzine, sodium chloride, traMADol, melatonin, magnesium hydroxide, docusate sodium, acetaminophen, senna, bisacodyl     Diagnostics:     CBC: No results for input(s): WBC, RBC, HGB, HCT, MCV, RDW, PLT in the last 72 hours. BMP:   Recent Labs     04/09/22  0654 04/10/22  0628 04/11/22  0738    141 140   K 4.3 4.6 4.1    107 105   CO2 22 24 24   BUN 55* 43* 36*   CREATININE 1.27* 1.04* 1.07*     BNP: No results for input(s): BNP in the last 72 hours. PT/INR: No results for input(s): PROTIME, INR in the last 72 hours. APTT: No results for input(s): APTT in the last 72 hours. CARDIAC ENZYMES: No results for input(s): CKMB, CKMBINDEX, TROPONINT in the last 72 hours. Invalid input(s): CKTOTAL;3  FASTING LIPID PANEL:  Lab Results   Component Value Date    CHOL 103 05/20/2019    HDL 74 03/12/2021    TRIG 66 05/20/2019     LIVER PROFILE: No results for input(s): AST, ALT, ALB, BILIDIR, BILITOT, ALKPHOS in the last 72 hours. I/O (24Hr): No intake or output data in the 24 hours ending 04/11/22 1021    Glu last 24 hour  No results for input(s): POCGLU in the last 72 hours. No results for input(s): CLARITYU, COLORU, PHUR, SPECGRAV, PROTEINU, RBCUA, BLOODU, BACTERIA, NITRU, WBCUA, LEUKOCYTESUR, YEAST, GLUCOSEU, BILIRUBINUR in the last 72 hours. Impression/Plan:    1. Cervical myelopathy:  S/p previous cervical decompression. With ataxia. PT/OT  for gait, mobility, strengthening, endurance, ADLs, and self care. 2.   Pain - gabapentin, as-needed tylenol, as-needed tramadol. 3. Elevated creatinine:  Creatinine 1.04 on 4/10, improving. IM holding lasix, restarted losartan. Asked IM about restarting lasix. Will recheck BMP in the morning. Restart low-dose Lasix on discharge-clarify dose  4.  Suspected costochondritis:  Pain control with as-needed tramadol, as-needed tylenol. 5. Bronchitis:  Completed course of steroids and zithromax with improvement. Having continued cough and increased wheezing a few days ago. CT chest showed no pneumonia or edema. On scheduled duo-neb. Has robitussin as needed - changed to robitussin-DM on 4/8. Added cepacol lozenges as needed on 4/8. Gave one-time dose of prednisone on 4/8, added 4 more doses on 4/9 after discussion with IM. Wean oxygen as tolerated - ordered home oxygen evaluation. Sleep home O2 evaluation not completed last, night, will complete today  6. Anemia:  Hemoglobin 11.7 on 4/7, stable to improved. Monitoring. On oral iron supplementation. 7. Lower limb cellulitis:  Resolved. Completed course of keflex. 8. CAD:  On atorvastatin, fenofibrate  9. Chronic diastolic heart failure: On lasix (currently on hold) clarify home dose with internal medicine  10. HTN:  On amlodipine, carvedilol, losartan  11. Chronic DVT:  On eliquis  12. Depression: On celexa. IM added buspirone. Patient previously declined psych evaluation. 13. Restless leg syndrome: On pramipexole  14. Nasal congestion, dryness: Added saline nasal spray as needed on 4/4. 15. Bowel Management: Miralax daily, senokot prn, dulcolax prn, docusate BID prn, milk of magnesia prn  16. DVT prophylaxis:  On eliquis  17. Internal Medicine for medical management  18. Discharge follow ups:  PCP 1-2 weeks, Dr. Domi Larios 4/19/22  19. Discharge tomorrow if okay with internal medicine-clarify Lasix dose, nebulizer/aerosol, prednisone to be completed 4/12, home nocturnal home O2 evaluation  follow-up with 1. PCP Dr. Laura Gómez 2. Rehab-Dr. Domi Larios CBC/BMP 1 week to PCP, home PT/OT/nursing        Marcus Valderrama MD       This note is created with the assistance of a speech recognition program.  While intending to generate a document that actually reflects the content of the visit, the document can still have some errors including those of syntax and sound a like substitutions which may escape proof reading.   In such instances, actual meaning can be extrapolated by contextual diversion

## 2022-04-12 VITALS
HEIGHT: 63 IN | SYSTOLIC BLOOD PRESSURE: 156 MMHG | OXYGEN SATURATION: 96 % | WEIGHT: 181 LBS | TEMPERATURE: 98.5 F | RESPIRATION RATE: 16 BRPM | DIASTOLIC BLOOD PRESSURE: 66 MMHG | BODY MASS INDEX: 32.07 KG/M2 | HEART RATE: 63 BPM

## 2022-04-12 PROCEDURE — 6370000000 HC RX 637 (ALT 250 FOR IP): Performed by: STUDENT IN AN ORGANIZED HEALTH CARE EDUCATION/TRAINING PROGRAM

## 2022-04-12 PROCEDURE — 6370000000 HC RX 637 (ALT 250 FOR IP): Performed by: INTERNAL MEDICINE

## 2022-04-12 PROCEDURE — 97530 THERAPEUTIC ACTIVITIES: CPT

## 2022-04-12 PROCEDURE — 97116 GAIT TRAINING THERAPY: CPT

## 2022-04-12 PROCEDURE — 6370000000 HC RX 637 (ALT 250 FOR IP): Performed by: PHYSICAL MEDICINE & REHABILITATION

## 2022-04-12 PROCEDURE — 97110 THERAPEUTIC EXERCISES: CPT

## 2022-04-12 PROCEDURE — 99239 HOSP IP/OBS DSCHRG MGMT >30: CPT | Performed by: PHYSICAL MEDICINE & REHABILITATION

## 2022-04-12 RX ADMIN — BUSPIRONE HYDROCHLORIDE 5 MG: 5 TABLET ORAL at 09:13

## 2022-04-12 RX ADMIN — GUAIFENESIN DEXTROMETHORPHAN HYDROBROMIDE ORAL SOLUTION 10 ML: 200; 20 SOLUTION ORAL at 03:34

## 2022-04-12 RX ADMIN — GABAPENTIN 200 MG: 100 CAPSULE ORAL at 09:11

## 2022-04-12 RX ADMIN — CYANOCOBALAMIN TAB 1000 MCG 1000 MCG: 1000 TAB at 09:11

## 2022-04-12 RX ADMIN — CITALOPRAM HYDROBROMIDE 20 MG: 20 TABLET ORAL at 09:11

## 2022-04-12 RX ADMIN — AMLODIPINE BESYLATE 5 MG: 5 TABLET ORAL at 09:11

## 2022-04-12 RX ADMIN — POLYETHYLENE GLYCOL 3350 17 G: 17 POWDER, FOR SOLUTION ORAL at 09:11

## 2022-04-12 RX ADMIN — APIXABAN 5 MG: 5 TABLET, FILM COATED ORAL at 09:11

## 2022-04-12 RX ADMIN — CALCIUM CARBONATE 600 MG (1,500 MG)-VITAMIN D3 400 UNIT TABLET 1 TABLET: at 09:11

## 2022-04-12 RX ADMIN — CARVEDILOL 12.5 MG: 12.5 TABLET, FILM COATED ORAL at 09:11

## 2022-04-12 RX ADMIN — LOSARTAN POTASSIUM 100 MG: 100 TABLET, FILM COATED ORAL at 09:11

## 2022-04-12 RX ADMIN — FERROUS SULFATE TAB 325 MG (65 MG ELEMENTAL FE) 325 MG: 325 (65 FE) TAB at 12:31

## 2022-04-12 RX ADMIN — PREDNISONE 40 MG: 20 TABLET ORAL at 09:11

## 2022-04-12 RX ADMIN — FENOFIBRATE 160 MG: 160 TABLET ORAL at 09:11

## 2022-04-12 ASSESSMENT — PAIN SCALES - GENERAL
PAINLEVEL_OUTOF10: 0

## 2022-04-12 ASSESSMENT — PAIN DESCRIPTION - LOCATION: LOCATION: NECK

## 2022-04-12 ASSESSMENT — PAIN DESCRIPTION - PAIN TYPE: TYPE: CHRONIC PAIN

## 2022-04-12 ASSESSMENT — PAIN DESCRIPTION - ORIENTATION: ORIENTATION: POSTERIOR;LEFT

## 2022-04-12 ASSESSMENT — PAIN SCALES - WONG BAKER: WONGBAKER_NUMERICALRESPONSE: 4

## 2022-04-12 NOTE — PLAN OF CARE
Problem: Skin Integrity:  Goal: Will show no infection signs and symptoms  4/12/2022 1432 by Elizabeth Colunga RN  Outcome: Completed  4/12/2022 0337 by Dawood Avila RN  Outcome: Ongoing  Goal: Absence of new skin breakdown  4/12/2022 1432 by Elizabeth Colunga RN  Outcome: Completed  4/12/2022 0337 by Dawood Avila RN  Outcome: Ongoing  Goal: Risk for impaired skin integrity will decrease  4/12/2022 1432 by Elizabeth Colunga RN  Outcome: Completed  4/12/2022 0337 by Dawood Avila RN  Outcome: Ongoing     Problem: Mobility - Impaired:  Goal: Mobility will improve  4/12/2022 1432 by Elizabeth Colunga RN  Outcome: Completed  4/12/2022 0337 by Dawood Avila RN  Outcome: Ongoing     Problem: Nutrition  Goal: Optimal nutrition therapy  4/12/2022 1432 by Elizabeth Colunga RN  Outcome: Completed  4/12/2022 0337 by Dawood Avila RN  Outcome: Ongoing

## 2022-04-12 NOTE — PROGRESS NOTES
Pt tx not given pt went to therapy early.  Nocturnal study given to unit clerk to give to [facility]

## 2022-04-12 NOTE — PROGRESS NOTES
Physical Medicine & Rehabilitation  Progress Note    4/12/2022 9:26 AM     CC: Ambulatory and ADL dysfunction due to cervical myelopathy    Subjective:   Feels well. Looking forward to discharge. Off oxygen    ROS:  Denies fevers, chills, sweats. No chest pain, palpitations, lightheadedness. Denies coughing, wheezing or shortness of breath. Denies abdominal pain, nausea, diarrhea or constipation. No new areas of joint pain. Denies new areas of numbness or weakness. Denies new anxiety or depression issues. No new skin problems. Rehabilitation:   PT:  Restrictions/Precautions: General Precautions,Fall Risk  Implants present? : Metal implants (cervical and lumbar surgeries, L wrist ORIF)  Other position/activity restrictions: up as tolerated; O2 3L/m via NC   Transfers  Sit to Stand: Supervision  Stand to sit: Supervision  Bed to Chair: Supervision  Stand Pivot Transfers: Supervision  Lateral Transfers: Supervision  Comment: uses RW  Ambulation 1  Surface: level tile  Device: Rolling Walker  Other Apparatus: Wheelchair follow  Assistance: Stand by assistance  Quality of Gait: Slow tawanda, Lack of hip and knee flexion on LLE, minimizing step height. Continued cues to  LLE more and promote Heel-toe strike, with poor carryover. Slight fwd flexed head posture with cues to correct. WBOS noted, but no LOB. Pt does drift to the left, educated pt to stop every few steps and reallign  herself with the rolling walker.    Gait Deviations: Slow Tawanda,Decreased step length,Decreased step height  Distance: 87ft (2MWT) ambulates total of 154 ft (2MWT)  Comments: Sao2 93% with exertion at room air          OT:  ADL  Equipment Provided: Reacher  Feeding: Modified independent  (Per pt report)  Grooming: None  UE Bathing: None  LE Bathing: Stand by assistance (SBA for standing, completes brice care only )  UE Dressing: None  LE Dressing: Stand by assistance,Verbal cueing (A for TEDS  and placing RLE into underwear )  Toileting: None  Additional Comments: pt declines UE dressing/bathing/ and grooming tasks this date. Pt reports completing UE tasks prior to OT tx. During LE dressing pt req vc for underwear orientation and req assist R foot into underwear this date    Instrumental ADL's  Instrumental ADLs: Yes     Balance  Sitting Balance: Modified independent   Standing Balance: Stand by assistance   Standing Balance  Time: AM: <1 min x2, 1-2 min x2, 2-3 mins;PM: 10-11 mins, 5-6 mins  Activity: ADLs and fucntional transfers, functional mobility, dynamic standing   Comment: with RW and sink for 0-1 UE support for balance maintenence, no LOB noted  Functional Mobility  Functional - Mobility Device: Rolling Walker  Activity: To/From therapy gym  Assist Level: Stand by assistance  Functional Mobility Comments: Functional mobility elicited from therapy gym to room this date to support tolerance and endurance needed to engage in ADLs, IADLs,  and functional mobility/transfers safely and independently. No LOB noted      Bed mobility  Rolling to Left: Independent  Rolling to Right: Independent  Supine to Sit: Supervision  Sit to Supine: Supervision  Scooting: Independent  Comment: in hospital bed, pt has a hospital bed at home  Transfers  Stand Pivot Transfers: Stand by assistance  Sit to stand: Stand by assistance  Stand to sit: Stand by assistance  Transfer Comments: verbal cues for hand placement and safety   Toilet Transfers  Toilet - Technique: Ambulating  Equipment Used: Grab bars  Toilet Transfer: Stand by assistance  Toilet Transfers Comments: with RW     Shower Transfers  Shower - Transfer From: Walker  Shower - Transfer Type: To and From  Shower - Transfer To:  Transfer tub bench  Shower - Technique: Ambulating  Shower Transfers: Stand by [employer] (SBA entering, CGA exiting)  Shower Transfers Comments: Verbal cues for hand placement and safety over ramp  Wheelchair Bed Transfers  Wheelchair/Bed - Technique: Stand pivot  Equipment Used: Other (RW)  Level of Asssistance: Moderate assistance  Wheelchair Transfers Comments: with RW; Blocking of L foot due to slipping      ST:            Objective:  BP (!) 156/66   Pulse 63   Temp 98.5 °F (36.9 °C) (Oral)   Resp 16   Ht 5' 3\" (1.6 m)   Wt 181 lb (82.1 kg)   SpO2 96%   BMI 32.06 kg/m²  I Body mass index is 32.06 kg/m². I   Wt Readings from Last 1 Encounters:   22 181 lb (82.1 kg)      Temp (24hrs), Av.2 °F (36.8 °C), Min:97.7 °F (36.5 °C), Max:98.5 °F (36.9 °C)         GEN: well developed, well nourished, no acute distress  HEENT: Normocephalic atraumatic, EOMI, mucous membranes pink and moist  CV: RRR, no murmurs, rubs or gallops  PULM: CTAB, no rales or rhonchi. Respirations WNL and unlabored  ABD: soft, NT, ND, +BS and equal  NEURO: A&O x3. Sensation intact to light touch. Year, president location, follows command  MSK:/5 upper and lower extremities  EXTREMITIES: No calf tenderness to palpation bilaterally. No edema BLEs  SKIN: warm dry and intact with good turgor  PSYCH: appropriately interactive. Affect WNL.   Good spirits        Medications   Scheduled Meds:   ipratropium-albuterol  1 ampule Inhalation 4x daily    losartan  100 mg Oral Daily    amLODIPine  5 mg Oral Daily    gabapentin  200 mg Oral BID    lidocaine  1 patch TransDERmal Daily    busPIRone  5 mg Oral TID    apixaban  5 mg Oral BID    atorvastatin  40 mg Oral Nightly    calcium carbonate w/vitamin D  1 tablet Oral Daily    carvedilol  12.5 mg Oral BID    citalopram  20 mg Oral Daily    fenofibrate  160 mg Oral Daily    ferrous sulfate  325 mg Oral BID    [Held by provider] furosemide  20 mg Oral BID    pramipexole  0.5 mg Oral Nightly    cyanocobalamin  1,000 mcg Oral Daily    polyethylene glycol  17 g Oral Daily     Continuous Infusions:  PRN Meds:.benzonatate, albuterol, perflutren lipid microspheres, Benzocaine-Menthol, dextromethorphan-guaiFENesin, hydrOXYzine, sodium chloride, traMADol, melatonin, magnesium hydroxide, docusate sodium, acetaminophen, senna, bisacodyl     Diagnostics:     CBC: No results for input(s): WBC, RBC, HGB, HCT, MCV, RDW, PLT in the last 72 hours. BMP:   Recent Labs     04/10/22  0628 04/11/22  0738    140   K 4.6 4.1    105   CO2 24 24   BUN 43* 36*   CREATININE 1.04* 1.07*     BNP: No results for input(s): BNP in the last 72 hours. PT/INR: No results for input(s): PROTIME, INR in the last 72 hours. APTT: No results for input(s): APTT in the last 72 hours. CARDIAC ENZYMES: No results for input(s): CKMB, CKMBINDEX, TROPONINT in the last 72 hours. Invalid input(s): CKTOTAL;3  FASTING LIPID PANEL:  Lab Results   Component Value Date    CHOL 103 05/20/2019    HDL 74 03/12/2021    TRIG 66 05/20/2019     LIVER PROFILE: No results for input(s): AST, ALT, ALB, BILIDIR, BILITOT, ALKPHOS in the last 72 hours. I/O (24Hr): No intake or output data in the 24 hours ending 04/12/22 0926    Glu last 24 hour  No results for input(s): POCGLU in the last 72 hours. No results for input(s): CLARITYU, COLORU, PHUR, SPECGRAV, PROTEINU, RBCUA, BLOODU, BACTERIA, NITRU, WBCUA, LEUKOCYTESUR, YEAST, GLUCOSEU, BILIRUBINUR in the last 72 hours. Impression/Plan:    1. Cervical myelopathy:  S/p previous cervical decompression. With ataxia. PT/OT  for gait, mobility, strengthening, endurance, ADLs, and self care. Discharge today  2. Pain - gabapentin, as-needed tylenol, as-needed tramadol. 3. Elevated creatinine:  Creatinine 1.04 on 4/10, improving. IM holding lasix, restarted losartan. Resume Lasix 20 mg daily on discharge-follow with your PCP  4. Suspected costochondritis:  Pain control with as-needed tramadol, as-needed tylenol. Improved  5. Bronchitis:  Completed course of steroids and zithromax with improvement. Having continued cough and increased wheezing last  week. CT chest showed no pneumonia or edema. On scheduled duo-neb.   Has robitussin as needed - changed to robitussin-DM on 4/8. Added cepacol lozenges as needed on 4/8. Gave one-time dose of prednisone on 4/8, added 4 more doses on 4/9 after discussion with IM. Wean oxygen as tolerated -O2 evaluation completed -does not qualify daytime or nighttime. Discharge medications reviewed by internal medicine-Combivent inhaler as needed on discharge,   6. Anemia:  Hemoglobin 11.7 on 4/7, stable to improved. Monitoring. On oral iron supplementation. 7. Lower limb cellulitis:  Resolved. Completed course of keflex. 8. CAD:  On atorvastatin, fenofibrate  9. Chronic diastolic heart failure: On lasix (currently on hold) clarify home dose with internal medicine  10. HTN:  On amlodipine, carvedilol, losartan  11. Chronic DVT:  On eliquis  12. Depression: On celexa. IM added buspirone. Patient previously declined psych evaluation. 13. Restless leg syndrome: On pramipexole  14. Nasal congestion, dryness: Added saline nasal spray as needed on 4/4. 15. Bowel Management: Miralax daily, senokot prn, dulcolax prn, docusate BID prn, milk of magnesia prn  16. DVT prophylaxis:  On eliquis  17. Internal Medicine for medical management  18. Discharge follow ups:  PCP 1-2 weeks, Dr. Emily Gonzalez 4/19/22  19. Discharge today if okay with internal medicine-Lasix dose 20 mg daily,  Combivent as needed's  prednisone to be completed 4/12, no  O2 day or night ,follow-up with 1. PCP Dr. Mira Mireles 2. Rehab-Dr. Emily Gonzalez ,CBC/BMP 1 week to PCP, home PT/OT/nursing reviewed meds with patient-Lasix once a day, done with steroids, as needed, lab work and follow-up, etc.    40-minute spent on discharge    Marcus Epps MD       This note is created with the assistance of a speech recognition program.  While intending to generate a document that actually reflects the content of the visit, the document can still have some errors including those of syntax and sound a like substitutions which may escape proof reading.   In such instances, actual meaning can be extrapolated by contextual diversion

## 2022-04-12 NOTE — PROGRESS NOTES
[address] [facility]    ACUTE REHABILITATION OCCUPATIONAL THERAPY  DAILY NOTE    Date: 22  Patient Name: Amanda Elizabeth      Room: 9065/9402-82    MRN: [id number]   : 1950  (71 y.o.)  Gender: female   Referring Practitioner: Chaparrita Allison MD  Diagnosis: Cervical myelopathy  Additional Pertinent Hx: 71 y.o.  female, with a history of anemia, spondylolisthesis, chronic DVT, chronic diastolic heart failure, CVA, major depressive disorder, s/p cervical spine fusion, stenosis of cervical spine with myelopathy, and DM type II, who presents with leg pain, shortness of breath, fall, and neck pain. According to patient and her , patient has had multiple falls recently including a fall Monday and evening and again on Tuesday. Patient reports that today she felt extremely weak upon waking. Restrictions  Restrictions/Precautions: General Precautions,Fall Risk  Implants present? : Metal implants (cervical and lumbar surgeries, L wrist ORIF)  Other position/activity restrictions: up as tolerated; O2 3L/m via NC  Required Braces or Orthoses?: No  Equipment Used: Other (RW)    Subjective  Subjective: \"I already washed up today since I had PT early this morning. \"   Comments: pleasant and agreeable to OT tx this date  Patient Currently in Pain: Denies  Restrictions/Precautions: General Precautions; Fall Risk  Overall Orientation Status: Within Functional Limits  Patient Observation  Observations: Pt on RA upon arrival   Pain Assessment  Pain Assessment: 0-10  Pain Level: 5  Pain Type: Acute pain  Pain Location: Neck  Pain Orientation: Posterior    Objective  Cognition  Overall Cognitive Status: WFL  Perception  Overall Perceptual Status: WFL  Balance  Standing Balance: Stand by assistance  Bed mobility  Scooting: Independent  Transfers  Sit to stand: Stand by assistance  Stand to sit: Stand by assistance  Transfer Comments: verbal cues for hand placement and safety  Standing Balance  Time: AM: 15-16 mins  Activity: Functional mobility/transfers  Comment: w/RW for 1-2 UE support for balance maintenence no LOB noted   Functional Mobility  Functional - Mobility Device: Rolling Walker  Activity: Other; To/from bathroom (around room and hallways)  Assist Level: Stand by assistance  Functional Mobility Comments: Fucntional mobility facilitated around room/bathroom/ hallways for cone retrival this date to support ability to self correct should balance be lost, tolerance and endurance needed to increase safety and independence in functinal tasks. Pt req vcs to take steps closer when reaching for targeted cones to decrease excessive leaning and prevent fall, with F carryover. Pt utilized reacher when gathering cones from floor level. When ambulating, pt req MOD vc for environmental awareness when going through narrow spaces and checing for expected/unexpected obstacles, F retrun and no LOB noted. Type of ROM/Therapeutic Exercise  Type of ROM/Therapeutic Exercise: Resistive Bands (green resistive band, x15 reps )  Comment: BUE ex faciliatetd to support strength and endurance needed to participate in functional tasks/transfers safely and independently. Pt req RB PRN due to fatigue and weakness  Exercises  Scapular Protraction: x  Scapular Retraction: x  Shoulder Flexion: x  Shoulder Extension: x  Horizontal ABduction: x  Horizontal ADduction: x  Elbow Flexion: x  Elbow Extension: x     Additional Activities: QUINONES provided education on home safety/fall prevention and BUE HEP to increased safety and independence once retruned to private resident. Pt verbalized G understanding to all education provided. Pt verbalized no questions/concerns at this time                  ADL  Additional Comments: declines all ADLs this date          Assessment  Performance deficits / Impairments: Decreased functional mobility ; Decreased ADL status; Decreased strength;Decreased endurance;Decreased sensation;Decreased balance;Decreased high-level IADLs;Decreased fine motor control;Decreased coordination  Prognosis: Good  Discharge Recommendations: 24 hour supervision or assist;Home with Home health OT  Activity Tolerance: Patient Tolerated treatment well  Safety Devices in place: Yes  Type of devices: Left in chair;Call light within reach; Patient at risk for falls  Restraints  Initially in place: No            Patient Goals   Patient goals : \"To be able to put my pants/socks on better\"  Learner:patient and significant other  Method: demonstration and explanation and handout       Outcome: needs reinforcement        Plan  Plan  Times per week: 5-7  Times per day: Twice a day  Current Treatment Recommendations: Balance Training,Functional Mobility Training,Endurance Training,Strengthening,ROM,Safety Education & Training,Patient/Caregiver Education & Training,Equipment Evaluation, Education, & procurement,Self-Care / ADL,Pain Management  Patient Goals   Patient goals :  \"To be able to put my pants/socks on better\"  Short term goals  Time Frame for Short term goals: 1 week   Short term goal 1: Pt will complete LB dressing/bathing/toileting CGA with Good safety with use of AE/DME/modified techniques for increased IND with self care  Short term goal 2: Pt will participate in 30+ minutes functional activity for increased strength/balance/endurance for increased IND in ADL's  Short term goal 3: Pt will complete transfers/functional mobility CGA with Good safety and RW for increased IND in ADL's  Short term goal 4: Pt will verbalize/demonstrate Good understanding of AE/DME/modified techniques for increased IND in self care  Short term goal 5: Pt will complete UB bathing/dressing/grooming with Supervision for increased IND in self care  Long term goals  Time Frame for Long term goals : by discharge   Long term goal 1: Pt will complete toileting/grooming/dressing Mod I and bathing with Supervison with Good safety with use of AE/DME/modified techniques for increased IND with self care  Long term goal 2: Pt will complete functional mobility/transferes during functional activity Mod I with Good safety and RW for increased IND in ADL's  Long term goal 3: Pt will verbalize/demonstrate Good understanding of fall prevention strategies for increased safety/IND in self care  Long term goal 4: Pt will improve L hand strength for self care as evidence by a 3# improvement in dynomometor testing   Long term goal 5: Pt will improve L [facility] as evidence by a 20 second improvement on 9 hole peg test for increased IND with self care        04/12/22 1055   OT Individual Minutes   Time In 1055   Time Out 1158   Minutes 63     Electronically signed by SHWETA Butterfield on 4/12/22 at 3:24 PM EDT

## 2022-04-12 NOTE — PROGRESS NOTES
Discharge instructions explained to patient and family. Discharged to home with home health services.

## 2022-04-12 NOTE — PROGRESS NOTES
CLINICAL PHARMACY NOTE: MEDS TO BEDS    Total # of Prescriptions Filled: 15   The following medications were delivered to the patient:  · Benzonatate 100mg  · Gabapentin 100mg  · Losartan 100mg  · Pramipexole 0.5mg  · Furosemide 20mg  · Amlodipine 5mg  · Carvedilol 12.5mg  · Atorvastatin 40mg  · Citalopram 20mg  · Eliquis 5mg  · Buspirone HCL 5mg  · Tramadol 50mg  · Combivent Respimat     Additional Documentation:  Delivered medications to nurses station

## 2022-04-12 NOTE — PLAN OF CARE
Problem: Skin Integrity:  Goal: Will show no infection signs and symptoms  Description: Will show no infection signs and symptoms  Outcome: Ongoing     Problem: Skin Integrity:  Goal: Absence of new skin breakdown  Description: Absence of new skin breakdown  Outcome: Ongoing     Problem: Skin Integrity:  Goal: Risk for impaired skin integrity will decrease  Description: Risk for impaired skin integrity will decrease  Outcome: Ongoing     Problem: Mobility - Impaired:  Goal: Mobility will improve  Description: Mobility will improve  Outcome: Ongoing     Problem: Nutrition  Goal: Optimal nutrition therapy  Outcome: Ongoing     Problem: NUTRITION  Goal: Patient maintains adequate hydration  Outcome: Ongoing     Problem: NUTRITION  Goal: Patient maintains weight  Outcome: Ongoing     Problem: NUTRITION  Goal: Patient/Family demonstrates understanding of diet  Outcome: Ongoing     Problem: NUTRITION  Goal: Patient/Family independently completes tube feeding  Outcome: Ongoing     Problem: NUTRITION  Goal: Patient will have no more than 5 lb weight change during LOS  Outcome: Ongoing     Problem: NUTRITION  Goal: Patient will utilize adaptive techniques to administer nutrition  Outcome: Ongoing     Problem:  Activity:  Goal: Risk for activity intolerance will decrease  Description: Risk for activity intolerance will decrease  Outcome: Ongoing     Problem: Coping:  Goal: Ability to adjust to condition or change in health will improve  Description: Ability to adjust to condition or change in health will improve  Outcome: Ongoing     Problem: Fluid Volume:  Goal: Ability to maintain a balanced intake and output will improve  Description: Ability to maintain a balanced intake and output will improve  Outcome: Ongoing     Problem: Nutritional:  Goal: Maintenance of adequate nutrition will improve  Description: Maintenance of adequate nutrition will improve  Outcome: Ongoing     Problem: Physical Regulation:  Goal: Complications related to the disease process, condition or treatment will be avoided or minimized  Description: Complications related to the disease process, condition or treatment will be avoided or minimized  Outcome: Ongoing

## 2022-04-20 ENCOUNTER — OFFICE VISIT (OUTPATIENT)
Dept: INTERNAL MEDICINE CLINIC | Age: 72
End: 2022-04-20
Payer: MEDICARE

## 2022-04-20 VITALS
HEART RATE: 69 BPM | DIASTOLIC BLOOD PRESSURE: 60 MMHG | WEIGHT: 179.4 LBS | BODY MASS INDEX: 31.79 KG/M2 | HEIGHT: 63 IN | OXYGEN SATURATION: 96 % | SYSTOLIC BLOOD PRESSURE: 108 MMHG

## 2022-04-20 DIAGNOSIS — E11.59 TYPE 2 DIABETES MELLITUS WITH OTHER CIRCULATORY COMPLICATION, WITHOUT LONG-TERM CURRENT USE OF INSULIN (HCC): ICD-10-CM

## 2022-04-20 DIAGNOSIS — N18.31 STAGE 3A CHRONIC KIDNEY DISEASE (HCC): ICD-10-CM

## 2022-04-20 DIAGNOSIS — I10 ESSENTIAL HYPERTENSION: ICD-10-CM

## 2022-04-20 DIAGNOSIS — I50.32 CHRONIC DIASTOLIC HEART FAILURE (HCC): ICD-10-CM

## 2022-04-20 DIAGNOSIS — Z09 HOSPITAL DISCHARGE FOLLOW-UP: ICD-10-CM

## 2022-04-20 DIAGNOSIS — G45.9 TIA (TRANSIENT ISCHEMIC ATTACK): Primary | ICD-10-CM

## 2022-04-20 DIAGNOSIS — E55.9 VITAMIN D DEFICIENCY: ICD-10-CM

## 2022-04-20 DIAGNOSIS — I82.401 DEEP VEIN THROMBOSIS (DVT) OF RIGHT LOWER EXTREMITY, UNSPECIFIED CHRONICITY, UNSPECIFIED VEIN (HCC): ICD-10-CM

## 2022-04-20 PROCEDURE — 99214 OFFICE O/P EST MOD 30 MIN: CPT | Performed by: INTERNAL MEDICINE

## 2022-04-20 PROCEDURE — 1111F DSCHRG MED/CURRENT MED MERGE: CPT | Performed by: INTERNAL MEDICINE

## 2022-04-20 ASSESSMENT — PATIENT HEALTH QUESTIONNAIRE - PHQ9
SUM OF ALL RESPONSES TO PHQ QUESTIONS 1-9: 0
10. IF YOU CHECKED OFF ANY PROBLEMS, HOW DIFFICULT HAVE THESE PROBLEMS MADE IT FOR YOU TO DO YOUR WORK, TAKE CARE OF THINGS AT HOME, OR GET ALONG WITH OTHER PEOPLE: 0
8. MOVING OR SPEAKING SO SLOWLY THAT OTHER PEOPLE COULD HAVE NOTICED. OR THE OPPOSITE, BEING SO FIGETY OR RESTLESS THAT YOU HAVE BEEN MOVING AROUND A LOT MORE THAN USUAL: 0
SUM OF ALL RESPONSES TO PHQ QUESTIONS 1-9: 0
SUM OF ALL RESPONSES TO PHQ QUESTIONS 1-9: 0
5. POOR APPETITE OR OVEREATING: 0
5. POOR APPETITE OR OVEREATING: 0
SUM OF ALL RESPONSES TO PHQ QUESTIONS 1-9: 0
2. FEELING DOWN, DEPRESSED OR HOPELESS: 0
3. TROUBLE FALLING OR STAYING ASLEEP: 0
1. LITTLE INTEREST OR PLEASURE IN DOING THINGS: 0
4. FEELING TIRED OR HAVING LITTLE ENERGY: 0
SUM OF ALL RESPONSES TO PHQ QUESTIONS 1-9: 0
SUM OF ALL RESPONSES TO PHQ QUESTIONS 1-9: 0
SUM OF ALL RESPONSES TO PHQ9 QUESTIONS 1 & 2: 0
9. THOUGHTS THAT YOU WOULD BE BETTER OFF DEAD, OR OF HURTING YOURSELF: 0
6. FEELING BAD ABOUT YOURSELF - OR THAT YOU ARE A FAILURE OR HAVE LET YOURSELF OR YOUR FAMILY DOWN: 0
7. TROUBLE CONCENTRATING ON THINGS, SUCH AS READING THE NEWSPAPER OR WATCHING TELEVISION: 0
3. TROUBLE FALLING OR STAYING ASLEEP: 0
1. LITTLE INTEREST OR PLEASURE IN DOING THINGS: 0
8. MOVING OR SPEAKING SO SLOWLY THAT OTHER PEOPLE COULD HAVE NOTICED. OR THE OPPOSITE, BEING SO FIGETY OR RESTLESS THAT YOU HAVE BEEN MOVING AROUND A LOT MORE THAN USUAL: 0
9. THOUGHTS THAT YOU WOULD BE BETTER OFF DEAD, OR OF HURTING YOURSELF: 0
SUM OF ALL RESPONSES TO PHQ9 QUESTIONS 1 & 2: 0
6. FEELING BAD ABOUT YOURSELF - OR THAT YOU ARE A FAILURE OR HAVE LET YOURSELF OR YOUR FAMILY DOWN: 0
SUM OF ALL RESPONSES TO PHQ QUESTIONS 1-9: 0
4. FEELING TIRED OR HAVING LITTLE ENERGY: 0
7. TROUBLE CONCENTRATING ON THINGS, SUCH AS READING THE NEWSPAPER OR WATCHING TELEVISION: 0
10. IF YOU CHECKED OFF ANY PROBLEMS, HOW DIFFICULT HAVE THESE PROBLEMS MADE IT FOR YOU TO DO YOUR WORK, TAKE CARE OF THINGS AT HOME, OR GET ALONG WITH OTHER PEOPLE: 0
2. FEELING DOWN, DEPRESSED OR HOPELESS: 0
SUM OF ALL RESPONSES TO PHQ QUESTIONS 1-9: 0

## 2022-04-20 NOTE — PROGRESS NOTES
Visit Information    Have you changed or started any medications since your last visit including any over-the-counter medicines, vitamins, or herbal medicines? no   Are you having any side effects from any of your medications? -  no  Have you stopped taking any of your medications? Is so, why? -  no    Have you seen any other physician or provider since your last visit? No  Have you had any other diagnostic tests since your last visit? No  Have you been seen in the emergency room and/or had an admission to a hospital since we last saw you? No  Have you had your routine dental cleaning in the past 6 months? no    Have you activated your [email or web address] account? If not, what are your barriers?  Yes     Patient Care Team:  Augustina Goncalves MD as PCP - General (Internal Medicine)  Augustina Goncalves MD as PCP - [facility]  Anusha Billings MD as Consulting Physician (Gastroenterology)    Medical History Review  Past Medical, Family, and Social History reviewed and does contribute to the patient presenting condition    Health Maintenance   Topic Date Due    Diabetic retinal exam  Never done    DTaP/Tdap/Td vaccine (1 - Tdap) Never done    Shingles Vaccine (1 of 2) Never done    COVID-19 Vaccine (3 - Booster for [employer] series) 09/08/2021    Annual Wellness Visit (AWV)  12/04/2021    Lipids  03/12/2022    Depression Monitoring  04/22/2022    Flu vaccine (Season Ended) 09/01/2022    A1C test (Diabetic or Prediabetic)  02/01/2023    Diabetic foot exam  03/12/2023    Potassium  04/11/2023    Creatinine  04/11/2023    Breast cancer screen  05/06/2023    Colorectal Cancer Screen  10/07/2026    DEXA (modify frequency per FRAX score)  Completed    Pneumococcal 65+ years Vaccine  Completed    Hepatitis C screen  Completed    Hepatitis A vaccine  Aged Out    Hib vaccine  Aged Out    Meningococcal (ACWY) vaccine  Aged Out         [address] [facility]  [address] [facility], [address] [facility] [address]  Dept: [phone]  Dept Fax: [phone]     Name: Primitivo Samaniego  : 1950           Chief Complaint   Patient presents with    Follow-Up from Hospital     bronchitis, frequent falls, cervical myelopathy - 2 sep ER visits    Respiratory Distress     bronchitis       History of Present Illness:    HPI  Patient here for posthospitalization follow-up,  Was admitted with acute CVA,  Subsequently admitted to acute rehab,  Sent home with physical therapy advised,  With home health care and home physical therapy,  Patient not very active at home,  Frequent falls,  Not very steady even at home at this time,  [place] 2 times in the last 2 weeks,    Past Medical History:    Past Medical History:   Diagnosis Date    Allergic rhinitis, cause unspecified     Back pain     lumbar    Bowel obstruction (HCC)     history of due to scar tissue, resolved non-surgically    C. difficile diarrhea     CAD (coronary artery disease)     no stent needed per pt.  Dr. Castro Reasons did cath at      Cardiac murmur     Cellulitis     left leg    Cellulitis 2017    leg left leg/bug bite    Cerebral artery occlusion with cerebral infarction [facility])     TIA 2014    COVID-19     ONE YR AGO IN 2020 fever and cough    Diverticulosis of colon (without mention of hemorrhage)     GERD (gastroesophageal reflux disease)     GERD (gastroesophageal reflux disease)     on rx    History of blood transfusion     approx         History of CHF (congestive heart failure)     History of MI (myocardial infarction)     thought due to a blood clot    History of ovarian cyst     had oopherectomy holly    History of peritonitis     due to ruptured appendix age 13    HTN (hypertension)     Hx of blood clots     right leg    Hyperlipidemia     Intestinal or peritoneal adhesions with obstruction (postoperative) (postinfection) ([address]     Kidney infection     renal failure/sepsis/spider bite    Lateral epicondylitis  of elbow  MDRO (multiple drug resistant organisms) resistance     c diff    Muscle strain     right posterior shoulder    Other abnormal glucose     PONV (postoperative nausea and vomiting)     dry heaves    Pre-diabetes     Restless legs syndrome (RLS)     Snores     no cpap    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)     TIA (transient ischemic attack) 2014    Uses walker     Vitamin D deficiency     Wears glasses     Wellness examination     last seen 2 weeks ago      Reviewed all health maintenance requirements and ordered appropriate tests  Health Maintenance Due   Topic Date Due    Diabetic retinal exam  Never done    DTaP/Tdap/Td vaccine (1 - Tdap) Never done    Shingles Vaccine (1 of 2) Never done    COVID-19 Vaccine (3 - Booster for [employer] series) 09/08/2021    Annual Wellness Visit (AWV)  12/04/2021    Lipids  03/12/2022    Depression Monitoring  04/22/2022       Past Surgical History:    Past Surgical History:   Procedure Laterality Date    ABDOMEN SURGERY  1976    benign tumor removed near remaining ovary, 1.5 pounds    APPENDECTOMY  1968    appendix ruptured, developed peritonitis    BACK SURGERY      BUNIONECTOMY Left     along with calcium deposits removed   [address]  2005    negative    CERVICAL FUSION  05/21/2021    POSTERIOR C3-6 LAMINECTOMY, PARTIAL C7 LAMINECTOMY, FUSION C3-C6, SILVERCORD    CERVICAL FUSION N/A 5/21/2021    POSTERIOR C3-6 LAMINECTOMY, PARTIAL C7 LAMINECTOMY, FUSION C3-C6, SILVERCORD performed by Kevin Martinez DO at [address] [facility] [address]    12 INCHES REMOVED D/T OBSTRUCTION    COLONOSCOPY      CYST REMOVAL Right     right facial    HYSTERECTOMY  1973    taken as a result of recurring cysts    LUMBAR FUSION N/A 02/10/2020    LUMBAR L4-5 POSTERIOR  DECOMPRESSION INSTRUMENTATION FUSION WCEMENT AUGMENTATION/ performed by Loulou Perez MD at [place] [address] N/A 06/17/2020    L5-S1 PLIF L4-L5 REVISION performed by Ruddy Rosales MD at [address] [facility]  08/14/2014    FESS    OVARY REMOVAL  1970    UNILATERAL due to cyst    OVARY REMOVAL  1971    partial, due to cyst    SINUS SURGERY  2004    UPPER GASTROINTESTINAL ENDOSCOPY N/A 05/31/2019    EGD ESOPHAGOGASTRODUODENOSCOPY performed by Leonid Mejia MD at [address] N/A 08/05/2019    EGD BIOPSY performed by Jovi Moulton MD at [facility] N/A 08/23/2019    EGD BIOPSY performed by Leonid Mejia MD at [address] [facility] 03/05/2019    WRIST OPEN REDUCTION INTERNAL FIXATION performed by Og Rogers MD at [address] [facility] OR        Medications:      Current Outpatient Medications:     furosemide (LASIX) 20 MG tablet, Take 1 tablet by mouth daily, Disp: 30 tablet, Rfl: 0    busPIRone (BUSPAR) 5 MG tablet, Take 1 tablet by mouth 3 times daily, Disp: 90 tablet, Rfl: 0    albuterol-ipratropium (COMBIVENT RESPIMAT)  MCG/ACT AERS inhaler, Inhale 1 puff into the lungs every 6 hours as needed for Wheezing or Shortness of Breath, Disp: 4 g, Rfl: 0    apixaban (ELIQUIS) 5 MG TABS tablet, Take 1 tablet by mouth 2 times daily, Disp: 60 tablet, Rfl: 0    gabapentin (NEURONTIN) 100 MG capsule, Take 2 capsules by mouth 2 times daily for 30 days. , Disp: 120 capsule, Rfl: 0    citalopram (CELEXA) 20 MG tablet, Take 1 tablet by mouth daily, Disp: 30 tablet, Rfl: 0    atorvastatin (LIPITOR) 40 MG tablet, Take 1 tablet by mouth nightly, Disp: 30 tablet, Rfl: 0    fenofibrate micronized (LOFIBRA) 134 MG capsule, Take 1 capsule by mouth every morning (before breakfast), Disp: 30 capsule, Rfl: 0    losartan (COZAAR) 100 MG tablet, Take 1 tablet by mouth daily, Disp: 30 tablet, Rfl: 0    pramipexole (MIRAPEX) 0.5 MG tablet, Take 1 tablet by mouth nightly, Disp: 30 tablet, Rfl: 0    carvedilol (COREG) 12.5 MG tablet, Take 1 tablet by mouth 2 times daily, Disp: 60 tablet, Rfl: 0    amLODIPine (NORVASC) 5 MG tablet, Take 1 tablet by mouth daily, Disp: 30 tablet, Rfl: 0    acetaminophen (TYLENOL) 325 MG tablet, Take 2 tablets by mouth every 4 hours as needed for Pain, Disp: , Rfl:     lidocaine 4 % external patch, Place 1 patch onto the skin daily as needed (shoulder pain) Apply patch to shoulder. Do not place over chest/sternum. Patch may remain in place for up to 12 hours in any 24 hour period. , Disp: , Rfl:     melatonin 3 MG TABS tablet, Take 2 tablets by mouth nightly as needed (insomnia), Disp: , Rfl:     nitroGLYCERIN (NITROSTAT) 0.4 MG SL tablet, Place 1 tablet under the tongue every 5 minutes as needed for Chest pain, Disp: 75 tablet, Rfl: 3    docusate sodium (COLACE) 100 MG capsule, Take 1 capsule by mouth 2 times daily as needed for Constipation, Disp: 60 capsule, Rfl: 1    cyanocobalamin ([employer] VITAMIN B12) 1000 MCG tablet, Take 1 tablet by mouth daily, Disp: 30 tablet, Rfl: 3    ferrous sulfate (IRON 325) 325 (65 Fe) MG tablet, Take 1 tablet by mouth 2 times daily, Disp: 90 tablet, Rfl: 2    VITAMIN D, ERGOCALCIFEROL, PO, Take by mouth, Disp: , Rfl:     Lancets MISC, 1 each by Does not apply route daily, Disp: 100 each, Rfl: 3    blood glucose monitor strips, Test 2 times a day & as needed for symptoms of irregular blood glucose., Disp: 100 strip, Rfl: 5    Calcium Carbonate-Vitamin D (CALCIUM 500/D) 500-125 MG-UNIT TABS, Take 1 tablet by mouth daily , Disp: , Rfl:     Allergies:      Bactrim [sulfamethoxazole-trimethoprim], Codeine, Diazepam, Meperidine hcl, and Seasonal    Social History:    Tobacco:    reports that she quit smoking about 4 years ago. Her smoking use included cigarettes. She started smoking about 26 years ago. She has a 10.00 pack-year smoking history. She has never used smokeless tobacco.  Alcohol:      reports no history of alcohol use. Drug Use:  reports no history of drug use.     Family History:    Family History   Problem Relation Age of Onset    Stroke Mother     Diabetes Mother     Heart Disease Mother     High Blood Pressure Mother     Heart Disease Father     Heart Disease Brother     High Blood Pressure Brother     Heart Disease Maternal Grandmother     High Blood Pressure Sister        Review of Systems:    Positive and Negative as described in HPI    Constitutional:  negative for  fevers, chills, sweats, fatigue, and weight loss  HEENT:  negative for vision or hearing changes,   Respiratory:  negative for shortness of breath, cough, or congestion  Cardiovascular:  negative for  chest pain, palpitations  Gastrointestinal:  negative for nausea, vomiting, diarrhea, constipation, abdominal pain  Genitourinary:  negative for frequency, dysuria  Integument/Breast:  negative for rash, skin lesions  Musculoskeletal:  negative for muscle aches or joint pain  Neurological:  negative for headaches, dizziness, lightheadedness, numbness, pain and tingling extrimities  Behavior/Psych:  negative for depression and anxiety      Physical Exam:    Vitals:  /60   Pulse 69   Ht 5' 3\" (1.6 m)   Wt 179 lb 6.4 oz (81.4 kg)   SpO2 96%   BMI 31.78 kg/m²     General appearance - alert, well appearing, and in no acute distress  Mental status - oriented to person, place, and time with normal affect  Head - normocephalic and atraumatic  Eyes - pupils equal and reactive, extraocular eye movements intact, conjunctiva clear  Ears - hearing appears to be intact  Nose - no drainage noted  Mouth - mucous membranes moist  Neck - supple, no carotid bruits, thyroid not palpable  Chest - clear to auscultation, normal effort  Heart - normal rate, regular rhythm, no murmurs  Abdomen - soft, nontender, nondistended, bowel sounds present all four quadrants, no masses, hepatomegaly or splenomegaly  Neurological - normal speech, no focal findings or movement disorder noted, cranial nerves II through XII grossly intact  Extremities - peripheral pulses palpable, no pedal edema or calf pain with palpation  Skin - no gross lesions, rashes, or induration noted      Data:    Lab Results   Component Value Date     04/11/2022    K 4.1 04/11/2022     04/11/2022    CO2 24 04/11/2022    BUN 36 04/11/2022    CREATININE 1.07 04/11/2022    GLUCOSE 137 04/11/2022    PROT 7.2 03/23/2022    LABALBU 4.3 03/23/2022    BILITOT <0.15 03/23/2022    ALKPHOS 48 03/23/2022    AST 16 03/23/2022    ALT 13 03/23/2022     Lab Results   Component Value Date    WBC 7.6 04/07/2022    RBC 3.80 04/07/2022    HGB 11.7 04/07/2022    HCT 35.3 04/07/2022    MCV 92.8 04/07/2022    MCH 30.8 04/07/2022    MCHC 33.2 04/07/2022    RDW 13.7 04/07/2022     04/07/2022    MPV 7.0 04/07/2022     Lab Results   Component Value Date    TSH 1.43 05/20/2019     Lab Results   Component Value Date    CHOL 103 05/20/2019    HDL 74 03/12/2021    LABA1C 5.1 02/01/2022          Assessment & Plan:     Diagnosis Orders   1. TIA (transient ischemic attack)     2. Essential hypertension     3. Deep vein thrombosis (DVT) of right lower extremity, unspecified chronicity, unspecified vein (HCC)     4. Chronic diastolic heart failure ([facility] [address]     5. Type 2 diabetes mellitus with other circulatory complication, without long-term current use of insulin (HCC)     6. Stage 3a chronic kidney disease ([facility] [address]     7. Vitamin D deficiency     8. Hospital discharge follow-up  [place] DISCHARGE MEDS RECONCILED W/ CURRENT OUTPATIENT MED LIST       1. TIA (transient ischemic attack)  2. Essential hypertension  3. Deep vein thrombosis (DVT) of right lower extremity, unspecified chronicity, unspecified vein (HCC)  4. Chronic diastolic heart failure ([facility] [address]  5. Type 2 diabetes mellitus with other circulatory complication, without long-term current use of insulin (HCC)  6. Stage 3a chronic kidney disease ([facility]  7. Vitamin D deficiency  8.  Hospital discharge follow-up  -     [place] DISCHARGE MEDS RECONCILED W/ CURRENT OUTPATIENT MED LIST       TIA, CVA,  On statin aspirin, Eliquis,  PT OT at home,  Still very unsteady and ataxic gait, fell twice in the last 2 weeks,  Strongly advised to use the walker and continue physical therapy and get stronger,  If falls again go to the ER and get admitted to the skilled nursing facility,    HTN:    Discussed in detail about the BP, lab results, importance of diet, salt intake, exercise, and med compliance. Medication side effects discussed in detail and has none today. Patient verbalized understanding. BP Readings from Last 3 Encounters:   04/20/22 108/60   04/12/22 (!) 156/66   03/23/22 (!) 127/46        CREATININE   Date Value Ref Range Status   04/11/2022 1.07 (H) 0.50 - 0.90 mg/dL Final   04/10/2022 1.04 (H) 0.50 - 0.90 mg/dL Final   04/09/2022 1.27 (H) 0.50 - 0.90 mg/dL Final          CKD  Patient has CKD 3,  Possibly underlying due to hypertension and diabetes,  Continue to avoid nephrotoxic agents,    KAILEY   Date Value Ref Range Status   01/25/2018 NEGATIVE NEG Final     Comment: This test was run on the [employer] KAILEY test system. The system provides   ten test results (HEp-2NA, dsDNA, SSA, SSB, Sm, RNP, Scl-70, Leni-1, Centromere   and Histone analytes) from a single patient sample. A negative KAILEY screen   indicates that the specimen was negative for all ten markers.   [address] [place] [address] [id number] [place] [address] [phone]       CREATININE   Date Value Ref Range Status   04/11/2022 1.07 (H) 0.50 - 0.90 mg/dL Final     Sed Rate   Date Value Ref Range Status   08/25/2019 12 0 - 20 mm Final        A history of DVT,  On Eliquis at this time,  No active bleeding at this time hemoglobin stable,  ,    Chronic diastolic heart failure,  Follows with cardiology,  Continue beta-blocker, ARB's, Lasix 20 mg daily,  Monitor potassium,    Iron deficiency anemia, continue iron replacement,    Major depression, continue antidepressants,    DM:  Discussed in detail about the Diabetes, lab results, importance of diet/carb control, exercise, and med compliance. Medication side effects discussed in detail and has none today. Micro and Macrovascular complications discussed with patient today. Patient verbalized understanding. Hemoglobin A1C   Date Value Ref Range Status   02/01/2022 5.1 % Final   02/22/2021 5.2 % Final   03/04/2020 5.7 % Final       Lab Results   Component Value Date    LDLCHOLESTEROL 50 03/12/2021       Lab Results   Component Value Date    LABMICR 11 08/05/2015         Foot Exam completed within last 12 months? Yes   Eye Exam completed within last 12 months? Yes     Continue same medications,  Side effects discussed in detail,  Age-specific screening discussed in detail,  Will follow in 2 months      Completed Refills   Requested Prescriptions      No prescriptions requested or ordered in this encounter     Return in 3 months (on 7/20/2022). No orders of the defined types were placed in this encounter.     Orders Placed This Encounter   Procedures    [place] DISCHARGE MEDS RECONCILED W/ CURRENT OUTPATIENT MED LIST       Electronically signed by Valencia Morales MD on 4/20/2022 at 4:16 PM

## 2022-04-28 DIAGNOSIS — I10 ESSENTIAL HYPERTENSION: ICD-10-CM

## 2022-04-28 RX ORDER — AMLODIPINE BESYLATE 5 MG/1
5 TABLET ORAL DAILY
Qty: 90 TABLET | Refills: 3 | OUTPATIENT
Start: 2022-04-28

## 2022-04-28 RX ORDER — LOSARTAN POTASSIUM 100 MG/1
100 TABLET ORAL DAILY
Qty: 90 TABLET | Refills: 3 | OUTPATIENT
Start: 2022-04-28

## 2022-05-02 ENCOUNTER — TELEPHONE (OUTPATIENT)
Dept: INTERNAL MEDICINE CLINIC | Age: 72
End: 2022-05-02

## 2022-05-02 RX ORDER — BUSPIRONE HYDROCHLORIDE 5 MG/1
5 TABLET ORAL 3 TIMES DAILY
Qty: 90 TABLET | Refills: 0 | Status: SHIPPED | OUTPATIENT
Start: 2022-05-02 | End: 2022-07-05 | Stop reason: SDUPTHER

## 2022-05-02 RX ORDER — GABAPENTIN 100 MG/1
200 CAPSULE ORAL 2 TIMES DAILY
Qty: 120 CAPSULE | Refills: 0 | Status: ON HOLD | OUTPATIENT
Start: 2022-05-02 | End: 2022-06-08

## 2022-05-02 NOTE — TELEPHONE ENCOUNTER
Patient fell on Friday. She hit the back of her head and the front. 2 different falls. No mental status change noted but does have 2 small bumps on her head.

## 2022-05-10 ENCOUNTER — OFFICE VISIT (OUTPATIENT)
Dept: NEUROSURGERY | Age: 72
End: 2022-05-10
Payer: MEDICARE

## 2022-05-10 ENCOUNTER — HOSPITAL ENCOUNTER (OUTPATIENT)
Age: 72
Discharge: HOME | End: 2022-05-12
Payer: MEDICARE

## 2022-05-10 ENCOUNTER — HOSPITAL ENCOUNTER (OUTPATIENT)
Dept: GENERAL RADIOLOGY | Age: 72
Discharge: HOME | End: 2022-05-12
Payer: MEDICARE

## 2022-05-10 ENCOUNTER — OFFICE VISIT (OUTPATIENT)
Dept: PHYSICAL MEDICINE AND REHAB | Age: 72
End: 2022-05-10
Payer: MEDICARE

## 2022-05-10 VITALS
WEIGHT: 179 LBS | TEMPERATURE: 97.7 F | SYSTOLIC BLOOD PRESSURE: 139 MMHG | HEART RATE: 73 BPM | DIASTOLIC BLOOD PRESSURE: 74 MMHG | HEIGHT: 63 IN | RESPIRATION RATE: 20 BRPM | OXYGEN SATURATION: 97 % | BODY MASS INDEX: 31.71 KG/M2

## 2022-05-10 VITALS
SYSTOLIC BLOOD PRESSURE: 125 MMHG | HEART RATE: 71 BPM | TEMPERATURE: 97.5 F | WEIGHT: 179 LBS | HEIGHT: 63 IN | BODY MASS INDEX: 31.71 KG/M2 | DIASTOLIC BLOOD PRESSURE: 64 MMHG

## 2022-05-10 DIAGNOSIS — R27.0 ATAXIA: ICD-10-CM

## 2022-05-10 DIAGNOSIS — M79.10 MYALGIA: Primary | ICD-10-CM

## 2022-05-10 DIAGNOSIS — Z98.1 S/P CERVICAL SPINAL FUSION: ICD-10-CM

## 2022-05-10 DIAGNOSIS — R26.81 GAIT INSTABILITY: ICD-10-CM

## 2022-05-10 DIAGNOSIS — R27.0 ATAXIA: Primary | ICD-10-CM

## 2022-05-10 DIAGNOSIS — R29.6 FALLS FREQUENTLY: ICD-10-CM

## 2022-05-10 PROCEDURE — 20552 NJX 1/MLT TRIGGER POINT 1/2: CPT | Performed by: PHYSICAL MEDICINE & REHABILITATION

## 2022-05-10 PROCEDURE — 99214 OFFICE O/P EST MOD 30 MIN: CPT | Performed by: NEUROLOGICAL SURGERY

## 2022-05-10 PROCEDURE — 99213 OFFICE O/P EST LOW 20 MIN: CPT | Performed by: PHYSICAL MEDICINE & REHABILITATION

## 2022-05-10 PROCEDURE — 1123F ACP DISCUSS/DSCN MKR DOCD: CPT | Performed by: NEUROLOGICAL SURGERY

## 2022-05-10 PROCEDURE — 72040 X-RAY EXAM NECK SPINE 2-3 VW: CPT

## 2022-05-10 RX ORDER — LIDOCAINE HYDROCHLORIDE 10 MG/ML
4 INJECTION, SOLUTION INFILTRATION; PERINEURAL ONCE
Status: COMPLETED | OUTPATIENT
Start: 2022-05-10 | End: 2022-05-10

## 2022-05-10 RX ADMIN — LIDOCAINE HYDROCHLORIDE 4 ML: 10 INJECTION, SOLUTION INFILTRATION; PERINEURAL at 14:10

## 2022-05-10 NOTE — PATIENT INSTRUCTIONS
Schedule a Vaccine  When you qualify to receive the vaccine, call the [address] [place] [address] COVID-19 Vaccination Hotline to schedule your appointment or to get additional information about the [address] [place] [address] locations which are offering the COVID-19 vaccine. To be 94% effective, it's important that you receive two doses of one of the COVID-19 vaccines. -If you are receiving the Gonzalez Peter vaccine, your second shot will be scheduled as close to 21 days after the first shot as possible. -If you are receiving the Moderna vaccine, your second shot will be scheduled as close to 28 days after the first shot as possible. [address] [place] [address] COVID-19 Vaccination Hotline: [phone]    Links to [address] [place] [address] website and [facility] website:    [email or web address]    [email or web address]

## 2022-05-10 NOTE — PROGRESS NOTES
[facility] PHYSICAL MEDICINE & REHABILITATION  [address] [place] [address]  [place] [address]  Dept: [phone]  Dept Fax: [phone]    Outpatient Followup Note    Tomi Frias, 71 y.o., female, presents for follow up c/o of Neck Pain, Shoulder Pain, and Other (difficulty walking/poor balance)  . HPI:     HPI  Patient with chronic cervical myelopathy and lumbar stenosis with muscular pain and tightness impairing range of motion. She denies any significant relief with medications. Pain is moderate, aching and constant. She was in [facility] for acute rehabilitation admission from 3/23/22 - 4/12/22. She developed bronchitis during admission which delayed some of her progress. She and her  note that she ambulated better in the parallel bars when she was forced to look straight forward. Since discharge she has had 2 falls. She continues to ambulate in flexed forward posture with rolling walker. Past Medical History:   Diagnosis Date    Allergic rhinitis, cause unspecified     Back pain     lumbar    Bowel obstruction (HCC)     history of due to scar tissue, resolved non-surgically    C. difficile diarrhea     CAD (coronary artery disease)     no stent needed per pt.  Dr. Ralph Davison did cath at  2005    Cardiac murmur     Cellulitis     left leg    Cellulitis 2017 August    leg left leg/bug bite    Cerebral artery occlusion with cerebral infarction [facility])     TIA 2014    COVID-19     ONE YR AGO IN 4/25/2020 fever and cough    Diverticulosis of colon (without mention of hemorrhage)     GERD (gastroesophageal reflux disease)     GERD (gastroesophageal reflux disease)     on rx    History of blood transfusion     approx 2020        History of CHF (congestive heart failure)     History of MI (myocardial infarction) 2005    thought due to a blood clot    History of ovarian cyst 1970    had oopherectomy holly    History of peritonitis 1968 due to ruptured appendix age 13    HTN (hypertension)     Hx of blood clots     right leg    Hyperlipidemia     Intestinal or peritoneal adhesions with obstruction (postoperative) (postinfection) ([address]     Kidney infection     renal failure/sepsis/spider bite    Lateral epicondylitis  of elbow     MDRO (multiple drug resistant organisms) resistance     c diff    Muscle strain     right posterior shoulder    Other abnormal glucose     PONV (postoperative nausea and vomiting)     dry heaves    Pre-diabetes     Restless legs syndrome (RLS)     Snores     no cpap    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)     TIA (transient ischemic attack) 2014    Uses walker     Vitamin D deficiency     Wears glasses     Wellness examination     last seen 2 weeks ago      Past Surgical History:   Procedure Laterality Date    ABDOMEN SURGERY  1976    benign tumor removed near remaining ovary, 1.5 pounds    APPENDECTOMY  1968    appendix ruptured, developed peritonitis    BACK SURGERY      BUNIONECTOMY Left     along with calcium deposits removed   [address]  2005    negative    CERVICAL FUSION  05/21/2021    POSTERIOR C3-6 LAMINECTOMY, PARTIAL C7 LAMINECTOMY, FUSION C3-C6, SILVERCORD    CERVICAL FUSION N/A 5/21/2021    POSTERIOR C3-6 LAMINECTOMY, PARTIAL C7 LAMINECTOMY, FUSION C3-C6, SILVERCORD performed by Ethan Gallegos DO at [address] [facility] [address]    12 INCHES REMOVED D/T OBSTRUCTION    COLONOSCOPY      CYST REMOVAL Right     right facial    HYSTERECTOMY  1973    taken as a result of recurring cysts    LUMBAR FUSION N/A 02/10/2020    LUMBAR L4-5 POSTERIOR  DECOMPRESSION INSTRUMENTATION FUSION WCEMENT AUGMENTATION/ performed by Rakesh Alston MD at [place] [address] N/A 06/17/2020    L5-S1 PLIF L4-L5 REVISION performed by Rakesh Alston MD at [facility].  08/14/2014    [facility]    UNILATERAL due to cyst    OVARY REMOVAL      partial, due to cyst   [place] [facility] SINUS SURGERY      UPPER GASTROINTESTINAL ENDOSCOPY N/A 2019    EGD ESOPHAGOGASTRODUODENOSCOPY performed by Júnior Jc MD at [address] N/A 2019    EGD BIOPSY performed by Betzy Blancas MD at [facility] N/A 2019    EGD BIOPSY performed by Júnior Jc MD at [facility] 2019    WRIST OPEN REDUCTION INTERNAL FIXATION performed by Malaika Hernandez MD at [facility] History   Problem Relation Age of Onset    Stroke Mother     Diabetes Mother     Heart Disease Mother     High Blood Pressure Mother     Heart Disease Father     Heart Disease Brother     High Blood Pressure Brother     Heart Disease Maternal Grandmother     High Blood Pressure Sister      Social History     Socioeconomic History    Marital status:      Spouse name: Not on file    Number of children: Not on file    Years of education: Not on file    Highest education level: Not on file   Occupational History    Occupation: retired   Tobacco Use    Smoking status: Former Smoker     Packs/day: 0.50     Years: 20.00     Pack years: 10.00     Types: Cigarettes     Start date: 1995     Quit date: 2017     Years since quittin.8    Smokeless tobacco: Never Used   Vaping Use    Vaping Use: Never used   Substance and Sexual Activity    Alcohol use: No     Alcohol/week: 0.0 standard drinks    Drug use: No    Sexual activity: Not on file   Other Topics Concern    Not on file   Social History Narrative    Not on file     Social Determinants of Health     Financial Resource Strain: Low Risk     Difficulty of Paying Living Expenses: Not hard at all   Food Insecurity: No Food Insecurity    Worried About [address] in the Last Year: Never true    [address] [facility] [address] in the Last Year: Never true   Transportation Needs:     Lack of Transportation (Medical): Not on file    Lack of Transportation (Non-Medical): Not on file   Physical Activity:     Days of Exercise per Week: Not on file    Minutes of Exercise per Session: Not on file   Stress:     Feeling of Stress : Not on file   Social Connections:     Frequency of Communication with Friends and Family: Not on file    Frequency of Social Gatherings with Friends and Family: Not on file    Attends [employer] Services: Not on file    Active Member of [address] [place] [address] or Organizations: Not on file    Attends Club or Organization Meetings: Not on file    Marital Status: Not on file   Intimate Partner Violence:     Fear of Current or Ex-Partner: Not on file    Emotionally Abused: Not on file    Physically Abused: Not on file    Sexually Abused: Not on file   Housing Stability:     Unable to Pay for Housing in the Last Year: Not on file    Number of [place] in the Last Year: Not on file    Unstable Housing in the Last Year: Not on file       Current Outpatient Medications   Medication Sig Dispense Refill    apixaban (ELIQUIS) 5 MG TABS tablet Take 1 tablet by mouth 2 times daily 60 tablet 0    busPIRone (BUSPAR) 5 MG tablet Take 1 tablet by mouth 3 times daily 90 tablet 0    gabapentin (NEURONTIN) 100 MG capsule Take 2 capsules by mouth 2 times daily for 30 days.  120 capsule 0    furosemide (LASIX) 20 MG tablet Take 1 tablet by mouth daily 30 tablet 0    albuterol-ipratropium (COMBIVENT RESPIMAT)  MCG/ACT AERS inhaler Inhale 1 puff into the lungs every 6 hours as needed for Wheezing or Shortness of Breath 4 g 0    citalopram (CELEXA) 20 MG tablet Take 1 tablet by mouth daily 30 tablet 0    atorvastatin (LIPITOR) 40 MG tablet Take 1 tablet by mouth nightly 30 tablet 0    fenofibrate micronized (LOFIBRA) 134 MG capsule Take 1 capsule by mouth every morning (before breakfast) 30 capsule 0    losartan (COZAAR) 100 MG tablet Take 1 tablet by mouth daily 30 tablet 0    pramipexole (MIRAPEX) 0.5 MG tablet Take 1 tablet by mouth nightly 30 tablet 0    carvedilol (COREG) 12.5 MG tablet Take 1 tablet by mouth 2 times daily 60 tablet 0    amLODIPine (NORVASC) 5 MG tablet Take 1 tablet by mouth daily 30 tablet 0    acetaminophen (TYLENOL) 325 MG tablet Take 2 tablets by mouth every 4 hours as needed for Pain      lidocaine 4 % external patch Place 1 patch onto the skin daily as needed (shoulder pain) Apply patch to shoulder. Do not place over chest/sternum. Patch may remain in place for up to 12 hours in any 24 hour period.  melatonin 3 MG TABS tablet Take 2 tablets by mouth nightly as needed (insomnia)      nitroGLYCERIN (NITROSTAT) 0.4 MG SL tablet Place 1 tablet under the tongue every 5 minutes as needed for Chest pain 75 tablet 3    docusate sodium (COLACE) 100 MG capsule Take 1 capsule by mouth 2 times daily as needed for Constipation 60 capsule 1    cyanocobalamin ([employer] VITAMIN B12) 1000 MCG tablet Take 1 tablet by mouth daily 30 tablet 3    ferrous sulfate (IRON 325) 325 (65 Fe) MG tablet Take 1 tablet by mouth 2 times daily 90 tablet 2    VITAMIN D, ERGOCALCIFEROL, PO Take by mouth      Lancets MISC 1 each by Does not apply route daily 100 each 3    blood glucose monitor strips Test 2 times a day & as needed for symptoms of irregular blood glucose. 100 strip 5    Calcium Carbonate-Vitamin D (CALCIUM 500/D) 500-125 MG-UNIT TABS Take 1 tablet by mouth daily        No current facility-administered medications for this visit. Allergies   Allergen Reactions    Bactrim [Sulfamethoxazole-Trimethoprim] Other (See Comments)     confusion    Codeine Itching    Diazepam Other (See Comments)    Meperidine Hcl Other (See Comments)    Seasonal        Subjective:      Review of Systems  Constitutional: Negative for fever, chills and unexpected weight change. HENT: Negative for trouble swallowing.     Respiratory: Negative for cough and shortness of breath. Cardiovascular: Negative for chest pain. Endocrine: Negative for polyuria. Genitourinary: Negative for dysuria, urgency, frequency, incontinence and difficulty urinating. Gastrointestinal: Negative for constipation or diarrhea. Musculoskeletal: Negative for arthralgias. Neurological: Negative for headaches, numbness, or tingling. Psychiatric: Negative for depressed mood or anxiety. Objective:     Physical Exam  /64   Pulse 71   Temp 97.5 °F (36.4 °C)   Ht 5' 3\" (1.6 m)   Wt 179 lb (81.2 kg)   BMI 31.71 kg/m²   Constitutional: She appears well-developed and well-nourished. In no distress. HEENT: NCAT, PERRL, EOMI. Mucous membranes pink and moist.  Pulmonary/Chest: Respirations WNL and unlabored. MSK: Functional ROM impaired cervical spine. L lateral rotation to 5 degrees, R lateral rotation to 20 degrees, forward flexion to 45 degrees, cervical spine extension to neutral. Rests with neck in flexion  Strength 4/5 key muscles BUEs. Neurological: She is alert and oriented to person, place, and time. DTRs 2+ and symmetric  Gait: Ataxic, with rolling walker, in forward flexed posture. Skin: Skin is warm dry and intact with good turgor. Psychiatric: She has a normal mood and affect. Her behavior is normal. Thought content normal.   Medical assistant note and vitals for today's encounter reviewed. Diagnostic Studies: None new    PROCEDURE:  Trigger Point Procedural Note    Indication: Severe pain and pain control    Procedure: 8 Trigger Points were identified in the L cervical paraspinal muscles and L upper trapezius. The patient was placed in the appropriate position and the area over the trigger point was prepped with iodine and alcohol. Injection was performed into the trigger point area using 0.5 ml Lidocaine 1% into each of the 8 trigger point(s) followed by dry needling. The injection site was covered with a bandage.     Complications: None, patient tolerated procedure well. Assessment:       Diagnosis Orders   1. Myalgia     2. Ataxia     3. Falls frequently          Plan:      Continue with home health therapy. Reviewed her lack of significant progress in mobility and transfers in acute rehabilitation. Discussed likely prognosis that this is her new baseline level of function. Encouraged her to be careful and reduce risk of falls as she is taking Eliquis. Trigger point injections as above. Continue with heat and stretching at home. Can repeat in 6 weeks if indicated. No orders of the defined types were placed in this encounter. Orders Placed This Encounter   Medications    lidocaine 1 % injection 4 mL     Return in about 6 weeks (around 6/21/2022). Electronically signedby Lor Sheets MD on 5/10/2022 at 2:17 PM.     Please note that this chartwas generated using voice recognition Dragon dictation software. Although everyeffort was made to ensure the accuracy of this automated transcription, some errorsin transcription may have occurred.

## 2022-05-10 NOTE — PROGRESS NOTES
Department of Neurosurgery                                                      Follow up visit      History Obtained From:  patient    CHIEF COMPLAINT:         Chief Complaint   Patient presents with    Follow-up       HISTORY OF PRESENT ILLNESS:       The patient is a 71 y.o. female who presents for follow up for gait instability. She denies neck pain    She has yet to see neurology  Regarding parkinsonism    PAST MEDICAL HISTORY :       Past Medical History:        Diagnosis Date    Allergic rhinitis, cause unspecified     Back pain     lumbar    Bowel obstruction (HCC)     history of due to scar tissue, resolved non-surgically    C. difficile diarrhea     CAD (coronary artery disease)     no stent needed per pt.  Dr. Daija Godfrey did cath at  2005    Cardiac murmur     Cellulitis     left leg    Cellulitis 2017 August    leg left leg/bug bite    Cerebral artery occlusion with cerebral infarction [facility])     TIA 2014    COVID-19     ONE YR AGO IN 4/25/2020 fever and cough    Diverticulosis of colon (without mention of hemorrhage)     GERD (gastroesophageal reflux disease)     GERD (gastroesophageal reflux disease)     on rx    History of blood transfusion     approx 2020        History of CHF (congestive heart failure)     History of MI (myocardial infarction) 2005    thought due to a blood clot    History of ovarian cyst 1970    had oopherectomy holly    History of peritonitis 1968    due to ruptured appendix age 13    HTN (hypertension)     Hx of blood clots     right leg    Hyperlipidemia     Intestinal or peritoneal adhesions with obstruction (postoperative) (postinfection) ([address]     Kidney infection     renal failure/sepsis/spider bite    Lateral epicondylitis  of elbow     MDRO (multiple drug resistant organisms) resistance     c diff    Muscle strain     right posterior shoulder    Other abnormal glucose     PONV (postoperative nausea and vomiting)     dry heaves    Pre-diabetes     Restless legs syndrome (RLS)     Snores     no cpap    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)     TIA (transient ischemic attack) 2014    Uses walker     Vitamin D deficiency     Wears glasses     Wellness examination     last seen 2 weeks ago       Past Surgical History:        Procedure Laterality Date    ABDOMINAL SURGERY  1976    benign tumor removed near remaining ovary, 1.5 pounds    APPENDECTOMY  1968    appendix ruptured, developed peritonitis    BACK SURGERY      BUNIONECTOMY Left     along with calcium deposits removed   [address]  2005    negative    CERVICAL FUSION  05/21/2021    POSTERIOR C3-6 LAMINECTOMY, PARTIAL C7 LAMINECTOMY, FUSION C3-C6, SILVERCORD    CERVICAL FUSION N/A 5/21/2021    POSTERIOR C3-6 LAMINECTOMY, PARTIAL C7 LAMINECTOMY, FUSION C3-C6, SILVERCORD performed by Linden Lew DO at [address]    12 INCHES REMOVED D/T OBSTRUCTION    COLONOSCOPY      CYST REMOVAL Right     right facial    HYSTERECTOMY  1973    taken as a result of recurring cysts    LUMBAR FUSION N/A 02/10/2020    LUMBAR L4-5 POSTERIOR  DECOMPRESSION INSTRUMENTATION FUSION WCEMENT AUGMENTATION/ performed by Arlette Burkitt, MD at [facility] N/A 06/17/2020    L5-S1 PLIF L4-L5 REVISION performed by Arlette Burkitt, MD at [facility] due to cyst    OOPHORECTOMY  1971    partial, due to cyst    OTHER SURGICAL HISTORY  08/14/2014    FESS    SINUS SURGERY  2004    UPPER GASTROINTESTINAL ENDOSCOPY N/A 05/31/2019    EGD ESOPHAGOGASTRODUODENOSCOPY performed by Maura Baig MD at [address] [place] [address] 08/05/2019    EGD BIOPSY performed by Asuncion Camejo MD at [address] [place] [address] 08/23/2019    EGD BIOPSY performed by Maura Baig MD at [address] [place] [address] 2019    WRIST OPEN REDUCTION INTERNAL FIXATION performed by Mariella Champion MD at [place] [address] History:   Social History     Socioeconomic History    Marital status:      Spouse name: Not on file    Number of children: Not on file    Years of education: Not on file    Highest education level: Not on file   Occupational History    Occupation: retired   Tobacco Use    Smoking status: Former Smoker     Packs/day: 0.50     Years: 20.00     Pack years: 10.00     Types: Cigarettes     Start date: 1995     Quit date: 2017     Years since quittin.9    Smokeless tobacco: Never Used   Vaping Use    Vaping Use: Never used   Substance and Sexual Activity    Alcohol use: No     Alcohol/week: 0.0 standard drinks    Drug use: No    Sexual activity: Not on file   Other Topics Concern    Not on file   Social History Narrative    Not on file     Social Determinants of Health     Financial Resource Strain: Low Risk     Difficulty of Paying Living Expenses: Not hard at all   Food Insecurity: No Food Insecurity    Worried About [address] in the Last Year: Never true    [address] in the Last Year: Never true   Transportation Needs:     Lack of Transportation (Medical): Not on file    Lack of Transportation (Non-Medical):  Not on file   Physical Activity:     Days of Exercise per Week: Not on file    Minutes of Exercise per Session: Not on file   Stress:     Feeling of Stress : Not on file   Social Connections:     Frequency of Communication with Friends and Family: Not on file    Frequency of Social Gatherings with Friends and Family: Not on file    Attends Scientologist Services: Not on file    Active Member of Clubs or Organizations: Not on file    Attends Club or Organization Meetings: Not on file    Marital Status: Not on file   Intimate Partner Violence:     Fear of Current or Ex-Partner: Not on file    Emotionally Abused: Not on file    Physically Abused: Not on file    Sexually Abused: Not on file   Housing Stability:     Unable to Pay for Housing in the Last Year: Not on file    Number of Places Lived in the Last Year: Not on file    Unstable Housing in the Last Year: Not on file       Family History:       Problem Relation Age of Onset    Stroke Mother     Diabetes Mother     Heart Disease Mother     High Blood Pressure Mother     Heart Disease Father     Heart Disease Brother     High Blood Pressure Brother     Heart Disease Maternal Grandmother     High Blood Pressure Sister        Allergies:  Bactrim [sulfamethoxazole-trimethoprim], Codeine, Diazepam, Meperidine hcl, and Seasonal    Home Medications:  Prior to Admission medications    Medication Sig Start Date End Date Taking?  Authorizing Provider   apixaban (ELIQUIS) 5 MG TABS tablet Take 1 tablet by mouth 2 times daily 5/2/22  Yes Leela Archuleta MD   busPIRone (BUSPAR) 5 MG tablet Take 1 tablet by mouth 3 times daily 5/2/22  Yes Leela Archuleta MD   furosemide (LASIX) 20 MG tablet Take 1 tablet by mouth daily 4/11/22  Yes Bernadette Saravia MD   albuterol-ipratropium (COMBIVENT RESPIMAT)  MCG/ACT AERS inhaler Inhale 1 puff into the lungs every 6 hours as needed for Wheezing or Shortness of Breath 4/10/22  Yes Demi Laboy MD   citalopram (CELEXA) 20 MG tablet Take 1 tablet by mouth daily 4/10/22  Yes Demi Laboy MD   atorvastatin (LIPITOR) 40 MG tablet Take 1 tablet by mouth nightly 4/10/22  Yes Demi Laboy MD   pramipexole (MIRAPEX) 0.5 MG tablet Take 1 tablet by mouth nightly 4/10/22  Yes Demi Laboy MD   carvedilol (COREG) 12.5 MG tablet Take 1 tablet by mouth 2 times daily 4/10/22  Yes Demi Laboy MD   amLODIPine (NORVASC) 5 MG tablet Take 1 tablet by mouth daily 4/10/22  Yes Demi Laboy MD   acetaminophen (TYLENOL) 325 MG tablet Take 2 tablets by mouth every 4 hours as needed for Pain 4/7/22  Yes Demi Laboy MD   melatonin 3 MG TABS tablet Take 2 tablets mg, 40 mg, Oral, Nightly  busPIRone (BUSPAR) tablet 5 mg, 5 mg, Oral, TID  calcium carb-cholecalciferol 600-400 MG-UNIT per tab 1 tablet, 1 tablet, Oral, Nightly  carvedilol (COREG) tablet 12.5 mg, 12.5 mg, Oral, BID  citalopram (CELEXA) tablet 20 mg, 20 mg, Oral, Daily  vitamin B-12 (CYANOCOBALAMIN) tablet 1,000 mcg, 1,000 mcg, Oral, Nightly  fenofibrate (TRIGLIDE) tablet 160 mg, 160 mg, Oral, Daily  ferrous sulfate (IRON 325) tablet 325 mg, 325 mg, Oral, BID  gabapentin (NEURONTIN) capsule 200 mg, 200 mg, Oral, BID  melatonin tablet 6 mg, 6 mg, Oral, Nightly PRN  pramipexole (MIRAPEX) tablet 0.5 mg, 0.5 mg, Oral, Nightly  sodium chloride flush 0.9 % injection 5-40 mL, 5-40 mL, IntraVENous, 2 times per day  sodium chloride flush 0.9 % injection 10 mL, 10 mL, IntraVENous, PRN  0.9 % sodium chloride infusion, , IntraVENous, PRN  potassium chloride (KLOR-CON M) extended release tablet 40 mEq, 40 mEq, Oral, PRN **OR** potassium bicarb-citric acid (EFFER-K) effervescent tablet 40 mEq, 40 mEq, Oral, PRN **OR** potassium chloride 10 mEq/100 mL IVPB (Peripheral Line), 10 mEq, IntraVENous, PRN  magnesium sulfate 1000 mg in dextrose 5% 100 mL IVPB, 1,000 mg, IntraVENous, PRN  ondansetron (ZOFRAN-ODT) disintegrating tablet 4 mg, 4 mg, Oral, Q8H PRN **OR** ondansetron (ZOFRAN) injection 4 mg, 4 mg, IntraVENous, Q6H PRN  polyethylene glycol (GLYCOLAX) packet 17 g, 17 g, Oral, Daily PRN  acetaminophen (TYLENOL) tablet 650 mg, 650 mg, Oral, Q6H PRN **OR** acetaminophen (TYLENOL) suppository 650 mg, 650 mg, Rectal, Q6H PRN      PHYSICAL EXAM:       /74 (Site: Right Upper Arm, Position: Sitting, Cuff Size: Medium Adult)   Pulse 73   Temp 97.7 °F (36.5 °C) (Temporal)   Resp 20   Ht 5' 3\" (1.6 m)   Wt 179 lb (81.2 kg)   SpO2 97%   BMI 31.71 kg/m²   Physical Exam     Gen: NAD  HEENT: moist mucus membranes  Cardio: RRR  Pulm: chest rise symmetrically  GI: abd soft  Ext: no edema  Skin: warm    Neuro:    AOX3  CN 2-12 grossly intact  Speech articulate  Motor 5/5  No pronator drift  Sensation symmetrical   Increase tone in left UE  sluffing gait when turning      Radiology Review:  None new      ASSESSMENT AND PLAN:       Patient Active Problem List   Diagnosis    Other specified disorders of rotator cuff syndrome of shoulder and allied disorders    Diverticulosis of large intestine    Intestinal or peritoneal adhesions with obstruction (postoperative) (postinfection) (HCC)    Restless legs syndrome (RLS)    GERD (gastroesophageal reflux disease)    Essential hypertension    Mixed hyperlipidemia    Other abnormal glucose    Atherosclerosis    Allergic rhinitis    Vitamin D deficiency    Depression    Degenerative joint disease (DJD) of hip    Peripheral edema    Injury of foot, left    Facial droop    CVA (cerebral vascular accident) ([address]    Bradycardia    Ataxia    Aphasia    TIA (transient ischemic attack)    Need for prophylactic vaccination and inoculation against cholera alone    Osteopenia    Lumbago    Meralgia paresthetica of right side    Lumbar degenerative disc disease    Spondylosis of lumbar region without myelopathy or radiculopathy    Blood poisoning    Cellulitis of left lower extremity    Encounter for medication monitoring    Deep vein thrombosis (DVT) of right lower extremity ([address]    Chronic deep vein thrombosis (DVT) of proximal vein of both lower extremities ([facility]    Chronic diastolic heart failure ([facility])    Neurogenic claudication due to lumbar spinal stenosis    Sacroiliitis ([facility])    Stage 3 chronic kidney disease ([address]    Type 2 diabetes mellitus with circulatory disorder, without long-term current use of insulin ([facility])    Chronic deep vein thrombosis (DVT) of popliteal vein of right lower extremity ([facility])    Closed fracture of left wrist    B12 deficiency    Iron deficiency anemia secondary to inadequate dietary iron intake    Closed head injury    Scalp laceration  Abnormal finding on imaging    Other irritable bowel syndrome    Frequent falls    Double vision    Muscle soreness    Peptic ulcer    Melena    PUD (peptic ulcer disease)    Absolute anemia    Acute blood loss anemia    Acute renal failure (HCC)    Clostridium difficile infection    Acquired spondylolisthesis    Spinal stenosis of lumbar region with neurogenic claudication    Acute deep vein thrombosis (DVT) of proximal vein of left lower extremity (HCC)    Leg swelling    Back pain    Neurological disorder    Closed unstable burst fracture of fifth lumbar vertebra with routine healing    Slow transit constipation    Major depressive disorder, recurrent, moderate (HCC)    Acute deep vein thrombosis (DVT) of femoral vein of left lower extremity (HCC)    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 female with Ataxia  -     XR CERVICAL SPINE FLEXION AND EXTENSION; Future  Gait instability  -     XR CERVICAL SPINE FLEXION AND EXTENSION; Future  S/P cervical spinal fusion  -     XR CERVICAL SPINE FLEXION AND EXTENSION; Future  neurology referal    Patient and/or family was counseled on the diagnosis and treatment plan    Kenn Mayfield DO     Board Certified Neurosurgeon  6/13/2022  11:40 PM      This note was created using voice recognition software. There may be inaccuracies of transcription  that are inadvertently overlooked prior to the signature. There is any questions about the transcription please contact me.

## 2022-05-23 DIAGNOSIS — E78.5 HYPERLIPIDEMIA, UNSPECIFIED HYPERLIPIDEMIA TYPE: ICD-10-CM

## 2022-05-23 RX ORDER — FENOFIBRATE 134 MG/1
134 CAPSULE ORAL
Qty: 90 CAPSULE | Refills: 2 | Status: SHIPPED | OUTPATIENT
Start: 2022-05-23

## 2022-06-02 ENCOUNTER — NURSE TRIAGE (OUTPATIENT)
Dept: OTHER | Facility: CLINIC | Age: 72
End: 2022-06-02

## 2022-06-02 NOTE — TELEPHONE ENCOUNTER
Received call from Ramona at [facility] with [employer]. Subjective: Caller states \"Leg swelling\"     Current Symptoms:   Bilateral leg swelling from the feet to the mid-calf; R>L  Patient reports the right leg is also swollen up past the knee  Blisters around the ankles that are weeping  Redness around the ankles    Hx DVT     Onset: 2 weeks ago; worsening    Pain Severity: Mild    Temperature: Denies    What has been tried: Elevation    Recommended disposition: Go to ED/UCC Now (Or to Office with PCP Approval)    Care advice provided, patient verbalizes understanding; denies any other questions or concerns; instructed to call back for any new or worsening symptoms. Writer provided warm transfer to Ellen Joseph at [facility] for 2nd level triage. Attention Provider: Thank you for allowing me to participate in the care of your patient. The patient was connected to triage in response to information provided to the ECC/PSC. Please do not respond through this encounter as the response is not directed to a shared pool.     Reason for Disposition   SEVERE swelling (e.g., swelling extends above knee, entire leg is swollen, weeping fluid)    Protocols used: LEG SWELLING AND EDEMA-ADULT-OH

## 2022-06-05 ENCOUNTER — APPOINTMENT (OUTPATIENT)
Dept: CT IMAGING | Age: 72
DRG: 603 | End: 2022-06-05
Payer: MEDICARE

## 2022-06-05 ENCOUNTER — APPOINTMENT (OUTPATIENT)
Dept: GENERAL RADIOLOGY | Age: 72
DRG: 603 | End: 2022-06-05
Payer: MEDICARE

## 2022-06-05 ENCOUNTER — HOSPITAL ENCOUNTER (INPATIENT)
Age: 72
LOS: 9 days | Discharge: NURSING, REHAB OR LONG-TERM CARE | DRG: 603 | End: 2022-06-14
Attending: STUDENT IN AN ORGANIZED HEALTH CARE EDUCATION/TRAINING PROGRAM | Admitting: INTERNAL MEDICINE
Payer: MEDICARE

## 2022-06-05 DIAGNOSIS — M79.89 LEG SWELLING: Primary | ICD-10-CM

## 2022-06-05 PROBLEM — L03.116 CELLULITIS OF BOTH LOWER EXTREMITIES: Status: ACTIVE | Noted: 2022-06-05

## 2022-06-05 PROBLEM — L03.115 CELLULITIS OF BOTH LOWER EXTREMITIES: Status: ACTIVE | Noted: 2022-06-05

## 2022-06-05 LAB
ABSOLUTE EOS #: 0.6 K/UL (ref 0–0.4)
ABSOLUTE LYMPH #: 1.6 K/UL (ref 1–4.8)
ABSOLUTE MONO #: 0.6 K/UL (ref 0.1–1.3)
ALBUMIN SERPL-MCNC: 4.3 G/DL (ref 3.5–5.2)
ALP BLD-CCNC: 47 U/L (ref 35–104)
ALT SERPL-CCNC: 17 U/L (ref 5–33)
ANION GAP SERPL CALCULATED.3IONS-SCNC: 14 MMOL/L (ref 9–17)
AST SERPL-CCNC: 24 U/L
BACTERIA: ABNORMAL
BASOPHILS # BLD: 1 % (ref 0–2)
BASOPHILS ABSOLUTE: 0.1 K/UL (ref 0–0.2)
BILIRUB SERPL-MCNC: 0.33 MG/DL (ref 0.3–1.2)
BILIRUBIN URINE: NEGATIVE
BUN BLDV-MCNC: 27 MG/DL (ref 8–23)
CALCIUM SERPL-MCNC: 9.2 MG/DL (ref 8.6–10.4)
CHLORIDE BLD-SCNC: 106 MMOL/L (ref 98–107)
CO2: 22 MMOL/L (ref 20–31)
COLOR: YELLOW
CREAT SERPL-MCNC: 0.91 MG/DL (ref 0.5–0.9)
EOSINOPHILS RELATIVE PERCENT: 9 % (ref 0–4)
EPITHELIAL CELLS UA: ABNORMAL /HPF
GFR AFRICAN AMERICAN: >60 ML/MIN
GFR NON-AFRICAN AMERICAN: >60 ML/MIN
GFR SERPL CREATININE-BSD FRML MDRD: ABNORMAL ML/MIN/{1.73_M2}
GLUCOSE BLD-MCNC: 109 MG/DL (ref 70–99)
GLUCOSE URINE: NEGATIVE
HCT VFR BLD CALC: 32.7 % (ref 36–46)
HEMOGLOBIN: 11.6 G/DL (ref 12–16)
KETONES, URINE: NEGATIVE
LEUKOCYTE ESTERASE, URINE: NEGATIVE
LYMPHOCYTES # BLD: 25 % (ref 24–44)
MCH RBC QN AUTO: 33.4 PG (ref 26–34)
MCHC RBC AUTO-ENTMCNC: 35.3 G/DL (ref 31–37)
MCV RBC AUTO: 94.4 FL (ref 80–100)
MONOCYTES # BLD: 9 % (ref 1–7)
NITRITE, URINE: NEGATIVE
PDW BLD-RTO: 13.6 % (ref 11.5–14.9)
PH UA: 7.5 (ref 5–8)
PLATELET # BLD: 334 K/UL (ref 150–450)
PMV BLD AUTO: 7 FL (ref 6–12)
POTASSIUM SERPL-SCNC: 4.2 MMOL/L (ref 3.7–5.3)
PRO-BNP: 171 PG/ML
PROTEIN UA: NEGATIVE
RBC # BLD: 3.46 M/UL (ref 4–5.2)
RBC UA: ABNORMAL /HPF
SEG NEUTROPHILS: 56 % (ref 36–66)
SEGMENTED NEUTROPHILS ABSOLUTE COUNT: 3.7 K/UL (ref 1.3–9.1)
SODIUM BLD-SCNC: 142 MMOL/L (ref 135–144)
SPECIFIC GRAVITY UA: 1.01 (ref 1–1.03)
TOTAL PROTEIN: 7 G/DL (ref 6.4–8.3)
TROPONIN, HIGH SENSITIVITY: 23 NG/L (ref 0–14)
TURBIDITY: CLEAR
URINE HGB: NEGATIVE
UROBILINOGEN, URINE: NORMAL
WBC # BLD: 6.6 K/UL (ref 3.5–11)
WBC UA: ABNORMAL /HPF

## 2022-06-05 PROCEDURE — 83880 ASSAY OF NATRIURETIC PEPTIDE: CPT

## 2022-06-05 PROCEDURE — 81001 URINALYSIS AUTO W/SCOPE: CPT

## 2022-06-05 PROCEDURE — 2580000003 HC RX 258: Performed by: NURSE PRACTITIONER

## 2022-06-05 PROCEDURE — 6360000002 HC RX W HCPCS: Performed by: STUDENT IN AN ORGANIZED HEALTH CARE EDUCATION/TRAINING PROGRAM

## 2022-06-05 PROCEDURE — 71045 X-RAY EXAM CHEST 1 VIEW: CPT

## 2022-06-05 PROCEDURE — 2580000003 HC RX 258: Performed by: STUDENT IN AN ORGANIZED HEALTH CARE EDUCATION/TRAINING PROGRAM

## 2022-06-05 PROCEDURE — 85025 COMPLETE CBC W/AUTO DIFF WBC: CPT

## 2022-06-05 PROCEDURE — 72125 CT NECK SPINE W/O DYE: CPT

## 2022-06-05 PROCEDURE — 93005 ELECTROCARDIOGRAM TRACING: CPT | Performed by: STUDENT IN AN ORGANIZED HEALTH CARE EDUCATION/TRAINING PROGRAM

## 2022-06-05 PROCEDURE — 80053 COMPREHEN METABOLIC PANEL: CPT

## 2022-06-05 PROCEDURE — 93970 EXTREMITY STUDY: CPT

## 2022-06-05 PROCEDURE — 1200000000 HC SEMI PRIVATE

## 2022-06-05 PROCEDURE — 99223 1ST HOSP IP/OBS HIGH 75: CPT | Performed by: INTERNAL MEDICINE

## 2022-06-05 PROCEDURE — 6370000000 HC RX 637 (ALT 250 FOR IP): Performed by: STUDENT IN AN ORGANIZED HEALTH CARE EDUCATION/TRAINING PROGRAM

## 2022-06-05 PROCEDURE — 70450 CT HEAD/BRAIN W/O DYE: CPT

## 2022-06-05 PROCEDURE — 84484 ASSAY OF TROPONIN QUANT: CPT

## 2022-06-05 PROCEDURE — 99285 EMERGENCY DEPT VISIT HI MDM: CPT

## 2022-06-05 PROCEDURE — 36415 COLL VENOUS BLD VENIPUNCTURE: CPT

## 2022-06-05 PROCEDURE — 6370000000 HC RX 637 (ALT 250 FOR IP): Performed by: NURSE PRACTITIONER

## 2022-06-05 RX ORDER — POTASSIUM CHLORIDE 7.45 MG/ML
10 INJECTION INTRAVENOUS PRN
Status: DISCONTINUED | OUTPATIENT
Start: 2022-06-05 | End: 2022-06-14 | Stop reason: HOSPADM

## 2022-06-05 RX ORDER — PRAMIPEXOLE DIHYDROCHLORIDE 0.25 MG/1
0.5 TABLET ORAL NIGHTLY
Status: DISCONTINUED | OUTPATIENT
Start: 2022-06-05 | End: 2022-06-14 | Stop reason: HOSPADM

## 2022-06-05 RX ORDER — FERROUS SULFATE 325(65) MG
325 TABLET ORAL 2 TIMES DAILY
Status: DISCONTINUED | OUTPATIENT
Start: 2022-06-05 | End: 2022-06-14 | Stop reason: HOSPADM

## 2022-06-05 RX ORDER — SODIUM CHLORIDE 9 MG/ML
INJECTION, SOLUTION INTRAVENOUS PRN
Status: DISCONTINUED | OUTPATIENT
Start: 2022-06-05 | End: 2022-06-14 | Stop reason: HOSPADM

## 2022-06-05 RX ORDER — ACETAMINOPHEN 650 MG/1
650 SUPPOSITORY RECTAL EVERY 6 HOURS PRN
Status: DISCONTINUED | OUTPATIENT
Start: 2022-06-05 | End: 2022-06-14 | Stop reason: HOSPADM

## 2022-06-05 RX ORDER — FENOFIBRATE 160 MG/1
160 TABLET ORAL DAILY
Status: DISCONTINUED | OUTPATIENT
Start: 2022-06-06 | End: 2022-06-14 | Stop reason: HOSPADM

## 2022-06-05 RX ORDER — CALCIUM CARBONATE/VITAMIN D3 600 MG-10
1 TABLET ORAL NIGHTLY
Status: DISCONTINUED | OUTPATIENT
Start: 2022-06-05 | End: 2022-06-14 | Stop reason: HOSPADM

## 2022-06-05 RX ORDER — SODIUM CHLORIDE 0.9 % (FLUSH) 0.9 %
5-40 SYRINGE (ML) INJECTION EVERY 12 HOURS SCHEDULED
Status: DISCONTINUED | OUTPATIENT
Start: 2022-06-05 | End: 2022-06-14 | Stop reason: HOSPADM

## 2022-06-05 RX ORDER — ACETAMINOPHEN 325 MG/1
650 TABLET ORAL EVERY 6 HOURS PRN
Status: DISCONTINUED | OUTPATIENT
Start: 2022-06-05 | End: 2022-06-14 | Stop reason: HOSPADM

## 2022-06-05 RX ORDER — ONDANSETRON 4 MG/1
4 TABLET, ORALLY DISINTEGRATING ORAL EVERY 8 HOURS PRN
Status: DISCONTINUED | OUTPATIENT
Start: 2022-06-05 | End: 2022-06-14 | Stop reason: HOSPADM

## 2022-06-05 RX ORDER — AMLODIPINE BESYLATE 5 MG/1
5 TABLET ORAL DAILY
Status: DISCONTINUED | OUTPATIENT
Start: 2022-06-06 | End: 2022-06-14 | Stop reason: HOSPADM

## 2022-06-05 RX ORDER — BUSPIRONE HYDROCHLORIDE 5 MG/1
5 TABLET ORAL 3 TIMES DAILY
Status: DISCONTINUED | OUTPATIENT
Start: 2022-06-05 | End: 2022-06-14 | Stop reason: HOSPADM

## 2022-06-05 RX ORDER — CITALOPRAM 20 MG/1
20 TABLET ORAL DAILY
Status: DISCONTINUED | OUTPATIENT
Start: 2022-06-06 | End: 2022-06-14 | Stop reason: HOSPADM

## 2022-06-05 RX ORDER — ONDANSETRON 2 MG/ML
4 INJECTION INTRAMUSCULAR; INTRAVENOUS EVERY 6 HOURS PRN
Status: DISCONTINUED | OUTPATIENT
Start: 2022-06-05 | End: 2022-06-14 | Stop reason: HOSPADM

## 2022-06-05 RX ORDER — MAGNESIUM SULFATE 1 G/100ML
1000 INJECTION INTRAVENOUS PRN
Status: DISCONTINUED | OUTPATIENT
Start: 2022-06-05 | End: 2022-06-14 | Stop reason: HOSPADM

## 2022-06-05 RX ORDER — ACETAMINOPHEN 325 MG/1
650 TABLET ORAL ONCE
Status: COMPLETED | OUTPATIENT
Start: 2022-06-05 | End: 2022-06-05

## 2022-06-05 RX ORDER — LANOLIN ALCOHOL/MO/W.PET/CERES
1000 CREAM (GRAM) TOPICAL NIGHTLY
Status: DISCONTINUED | OUTPATIENT
Start: 2022-06-05 | End: 2022-06-14 | Stop reason: HOSPADM

## 2022-06-05 RX ORDER — DOXYCYCLINE HYCLATE 100 MG
100 TABLET ORAL 2 TIMES DAILY
Qty: 14 TABLET | Refills: 0 | Status: SHIPPED | OUTPATIENT
Start: 2022-06-05 | End: 2022-06-08 | Stop reason: HOSPADM

## 2022-06-05 RX ORDER — LANOLIN ALCOHOL/MO/W.PET/CERES
6 CREAM (GRAM) TOPICAL NIGHTLY PRN
Status: DISCONTINUED | OUTPATIENT
Start: 2022-06-05 | End: 2022-06-14 | Stop reason: HOSPADM

## 2022-06-05 RX ORDER — CARVEDILOL 12.5 MG/1
12.5 TABLET ORAL 2 TIMES DAILY
Status: DISCONTINUED | OUTPATIENT
Start: 2022-06-05 | End: 2022-06-14 | Stop reason: HOSPADM

## 2022-06-05 RX ORDER — POTASSIUM CHLORIDE 20 MEQ/1
40 TABLET, EXTENDED RELEASE ORAL PRN
Status: DISCONTINUED | OUTPATIENT
Start: 2022-06-05 | End: 2022-06-14 | Stop reason: HOSPADM

## 2022-06-05 RX ORDER — GABAPENTIN 100 MG/1
200 CAPSULE ORAL 2 TIMES DAILY
Status: DISCONTINUED | OUTPATIENT
Start: 2022-06-05 | End: 2022-06-14 | Stop reason: HOSPADM

## 2022-06-05 RX ORDER — ATORVASTATIN CALCIUM 40 MG/1
40 TABLET, FILM COATED ORAL NIGHTLY
Status: DISCONTINUED | OUTPATIENT
Start: 2022-06-05 | End: 2022-06-14 | Stop reason: HOSPADM

## 2022-06-05 RX ORDER — POLYETHYLENE GLYCOL 3350 17 G/17G
17 POWDER, FOR SOLUTION ORAL DAILY PRN
Status: DISCONTINUED | OUTPATIENT
Start: 2022-06-05 | End: 2022-06-14 | Stop reason: HOSPADM

## 2022-06-05 RX ORDER — SODIUM CHLORIDE 0.9 % (FLUSH) 0.9 %
10 SYRINGE (ML) INJECTION PRN
Status: DISCONTINUED | OUTPATIENT
Start: 2022-06-05 | End: 2022-06-14 | Stop reason: HOSPADM

## 2022-06-05 RX ORDER — FUROSEMIDE 10 MG/ML
20 INJECTION INTRAMUSCULAR; INTRAVENOUS DAILY
Status: DISCONTINUED | OUTPATIENT
Start: 2022-06-06 | End: 2022-06-07

## 2022-06-05 RX ADMIN — CARVEDILOL 12.5 MG: 12.5 TABLET, FILM COATED ORAL at 22:52

## 2022-06-05 RX ADMIN — PRAMIPEXOLE DIHYDROCHLORIDE 0.5 MG: 0.25 TABLET ORAL at 23:18

## 2022-06-05 RX ADMIN — FERROUS SULFATE TAB 325 MG (65 MG ELEMENTAL FE) 325 MG: 325 (65 FE) TAB at 22:52

## 2022-06-05 RX ADMIN — CALCIUM CARBONATE 600 MG (1,500 MG)-VITAMIN D3 400 UNIT TABLET 1 TABLET: at 22:52

## 2022-06-05 RX ADMIN — APIXABAN 5 MG: 5 TABLET, FILM COATED ORAL at 22:52

## 2022-06-05 RX ADMIN — ACETAMINOPHEN 650 MG: 325 TABLET ORAL at 15:31

## 2022-06-05 RX ADMIN — GABAPENTIN 200 MG: 100 CAPSULE ORAL at 22:52

## 2022-06-05 RX ADMIN — ATORVASTATIN CALCIUM 40 MG: 40 TABLET, FILM COATED ORAL at 22:52

## 2022-06-05 RX ADMIN — SODIUM CHLORIDE: 9 INJECTION, SOLUTION INTRAVENOUS at 23:22

## 2022-06-05 RX ADMIN — CYANOCOBALAMIN TAB 1000 MCG 1000 MCG: 1000 TAB at 23:18

## 2022-06-05 RX ADMIN — BUSPIRONE HYDROCHLORIDE 5 MG: 5 TABLET ORAL at 22:52

## 2022-06-05 RX ADMIN — VANCOMYCIN HYDROCHLORIDE 2000 MG: 1 INJECTION, POWDER, LYOPHILIZED, FOR SOLUTION INTRAVENOUS at 23:23

## 2022-06-05 RX ADMIN — ACETAMINOPHEN 650 MG: 325 TABLET ORAL at 19:34

## 2022-06-05 ASSESSMENT — PAIN SCALES - GENERAL
PAINLEVEL_OUTOF10: 8
PAINLEVEL_OUTOF10: 6
PAINLEVEL_OUTOF10: 0
PAINLEVEL_OUTOF10: 7
PAINLEVEL_OUTOF10: 8

## 2022-06-05 ASSESSMENT — ENCOUNTER SYMPTOMS
CONSTIPATION: 0
BACK PAIN: 0
DIARRHEA: 0
VOMITING: 0
COLOR CHANGE: 1
PHOTOPHOBIA: 0
COLOR CHANGE: 0
EYE ITCHING: 0
FACIAL SWELLING: 0
RHINORRHEA: 0
SHORTNESS OF BREATH: 0
NAUSEA: 0
COUGH: 1
WHEEZING: 0
SORE THROAT: 0
ABDOMINAL PAIN: 0
COUGH: 0

## 2022-06-05 ASSESSMENT — PAIN - FUNCTIONAL ASSESSMENT
PAIN_FUNCTIONAL_ASSESSMENT: NONE - DENIES PAIN
PAIN_FUNCTIONAL_ASSESSMENT: 0-10
PAIN_FUNCTIONAL_ASSESSMENT: 0-10
PAIN_FUNCTIONAL_ASSESSMENT: NONE - DENIES PAIN
PAIN_FUNCTIONAL_ASSESSMENT: 0-10

## 2022-06-05 ASSESSMENT — PAIN DESCRIPTION - LOCATION
LOCATION: NECK
LOCATION: LEG
LOCATION: LEG

## 2022-06-05 ASSESSMENT — PAIN DESCRIPTION - FREQUENCY
FREQUENCY: CONTINUOUS
FREQUENCY: INTERMITTENT

## 2022-06-05 ASSESSMENT — LIFESTYLE VARIABLES: HOW OFTEN DO YOU HAVE A DRINK CONTAINING ALCOHOL: NEVER

## 2022-06-05 ASSESSMENT — PAIN DESCRIPTION - DESCRIPTORS
DESCRIPTORS: ACHING
DESCRIPTORS: ACHING
DESCRIPTORS: ACHING;BURNING

## 2022-06-05 ASSESSMENT — PAIN DESCRIPTION - PAIN TYPE
TYPE: ACUTE PAIN
TYPE: ACUTE PAIN

## 2022-06-05 ASSESSMENT — PAIN DESCRIPTION - ORIENTATION: ORIENTATION: RIGHT;LEFT

## 2022-06-05 NOTE — ED NOTES
NURSE REPORT:    Verbal report given to Ermias Lopez RN on [place]      Report consisted of patient's Situation, Background, Assessment and   Recommendations(SBAR). Information from the following report(s) Nurse Handoff Report was reviewed with the receiving nurse. Lines:   Peripheral IV 06/05/22 Anterior;Right Antecubital (Active)       Peripheral IV 06/05/22 Left Arm (Active)   Site Assessment Clean, dry & intact; Clean;Dry; Intact 06/05/22 1530   Line Status Blood return noted;Brisk blood return;Normal saline locked;Specimen collected; Flushed 06/05/22 1530   Line Care Connections checked and tightened 06/05/22 1530   Phlebitis Assessment No symptoms 06/05/22 1530   Infiltration Assessment 0 06/05/22 1530   Alcohol Cap Used No 06/05/22 1530   Dressing Status New dressing applied;Clean, dry & intact; Clean;Dry; Intact 06/05/22 1530   Dressing Type Transparent 06/05/22 1530   Dressing Intervention New 06/05/22 1530        Opportunity for questions and clarification was provided.            Kaylah Ferguson RN  06/05/22 [id number]

## 2022-06-05 NOTE — ED NOTES
Verbal order received from Dr. Kirit Guzman to remove C-Collar. Patient adjusted and made comfortable in bed without complaints of distress.       Dorys Ortiz RN  06/05/22 [id number]

## 2022-06-05 NOTE — ED PROVIDER NOTES
EMERGENCY DEPARTMENT ENCOUNTER    Pt Name: Radha Jones  MRN: [id number]  [place] 1950  Date of evaluation: 6/5/22  CHIEF COMPLAINT       Chief Complaint   Patient presents with    Leg Swelling     HISTORY OF PRESENT ILLNESS   HPI  78-year-old female history of coronary artery disease, hypertension, GERD, DVT on Eliquis presents for evaluation of bilateral lower extremity edema, increasing falls due to unsteadiness on her feet. Patient's most recent fall was 2 days ago. Fell backwards and hit her head. Is having associated posterior headache. No nausea or vomiting. No fever chills. No abdominal pain shortness of breath or chest pain. No cough or fever. Describes symmetric bilateral lower extremity edema. Has associated chronic anterior lower extremity wounds. Patient describes unsteadiness as dysequilibrium not having lightheadedness or passing out. No other focal neurologic complaints. No other injuries. REVIEW OF SYSTEMS     Review of Systems   Constitutional: Negative for chills and fatigue. HENT: Negative for facial swelling, postnasal drip and rhinorrhea. Eyes: Negative for photophobia and itching. Respiratory: Negative for cough and shortness of breath. Cardiovascular: Positive for leg swelling. Negative for chest pain. Gastrointestinal: Negative for abdominal pain, diarrhea, nausea and vomiting. Genitourinary: Negative for dysuria, flank pain and hematuria. Musculoskeletal: Negative for arthralgias and joint swelling. Skin: Negative for color change and rash. Neurological: Positive for headaches. Negative for dizziness, syncope, speech difficulty, weakness and numbness.      PASTMEDICAL HISTORY     Past Medical History:   Diagnosis Date    Allergic rhinitis, cause unspecified     Back pain     lumbar    Bowel obstruction (HCC)     history of due to scar tissue, resolved non-surgically    C. difficile diarrhea     CAD (coronary artery disease)     no stent needed per pt.  Dr. Octavia Zavaleta did cath at [facility]    Cardiac murmur     Cellulitis     left leg    Cellulitis 2017 August    leg left leg/bug bite    Cerebral artery occlusion with cerebral infarction [facility])     TIA 2014    COVID-19     ONE YR AGO IN 4/25/2020 fever and cough    Diverticulosis of colon (without mention of hemorrhage)     GERD (gastroesophageal reflux disease)     GERD (gastroesophageal reflux disease)     on rx    History of blood transfusion     approx 2020        History of CHF (congestive heart failure)     History of MI (myocardial infarction) 2005    thought due to a blood clot    History of ovarian cyst 1970    had oopherectomy holly    History of peritonitis 1968    due to ruptured appendix age 13    HTN (hypertension)     Hx of blood clots     right leg    Hyperlipidemia     Intestinal or peritoneal adhesions with obstruction (postoperative) (postinfection) ([address]     Kidney infection     renal failure/sepsis/spider bite    Lateral epicondylitis  of elbow     MDRO (multiple drug resistant organisms) resistance     c diff    Muscle strain     right posterior shoulder    Other abnormal glucose     PONV (postoperative nausea and vomiting)     dry heaves    Pre-diabetes     Restless legs syndrome (RLS)     Snores     no cpap    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)     TIA (transient ischemic attack) 2014    Uses walker     Vitamin D deficiency     Wears glasses     Wellness examination     last seen 2 weeks ago     Past Problem List  Patient Active Problem List   Diagnosis Code    Other specified disorders of rotator cuff syndrome of shoulder and allied disorders M75.100    Diverticulosis of large intestine K57.30    Intestinal or peritoneal adhesions with obstruction (postoperative) (postinfection) ([address] K56.50    Restless legs syndrome (RLS) G25.81    GERD (gastroesophageal reflux disease) K21.9    Essential hypertension I10    Mixed hyperlipidemia E78.2    Other abnormal glucose R73.09    Atherosclerosis I70.90    Allergic rhinitis J30.9    Vitamin D deficiency E55.9    Depression F32. A    Degenerative joint disease (DJD) of hip M16.9    Peripheral edema R60.9    Injury of foot, left Q82.168J    Facial droop R29.810    CVA (cerebral vascular accident) ([facility] I63.9    Bradycardia R00.1    Ataxia R27.0    Aphasia R47.01    TIA (transient ischemic attack) G45.9    Need for prophylactic vaccination and inoculation against cholera alone Z23    Osteopenia M85.80    Lumbago M54.50    Meralgia paresthetica of right side G57.11    Lumbar degenerative disc disease M51.36    Spondylosis of lumbar region without myelopathy or radiculopathy M47.816    Blood poisoning [id number]    Cellulitis of left lower extremity L03. 80    Encounter for medication monitoring Z51.81    Deep vein thrombosis (DVT) of right lower extremity (HCC) I82.401    Chronic deep vein thrombosis (DVT) of proximal vein of both lower extremities ([facility]) I82.5Y3    Chronic diastolic heart failure ([facility]) I50.32    Neurogenic claudication due to lumbar spinal stenosis M48.062    Sacroiliitis ([facility]) M46.1    Stage 3 chronic kidney disease ([facility]) N18.30    Type 2 diabetes mellitus with circulatory disorder, without long-term current use of insulin ([facility]) E11.59    Chronic deep vein thrombosis (DVT) of popliteal vein of right lower extremity ([facility]) I82.531    Closed fracture of left wrist S62.102A    B12 deficiency E53.8    Iron deficiency anemia secondary to inadequate dietary iron intake D50.8    Closed head injury S09.90XA    Scalp laceration S01. 01XA    Abnormal finding on imaging R93.89    Other irritable bowel syndrome K58.8    Frequent falls R29.6    Double vision H53.2    Muscle soreness M79.10    Peptic ulcer K27.9    Melena K92.1    PUD (peptic ulcer disease) K27.9    Absolute anemia D64.9    Acute blood loss anemia D62    Acute renal failure (HCC) N17.9    Clostridium difficile infection A49.8    Acquired spondylolisthesis M43.10    Spinal stenosis of lumbar region with neurogenic claudication M48.062    Acute deep vein thrombosis (DVT) of proximal vein of left lower extremity (HCC) I82.4Y2    Leg swelling M79.89    Back pain M54.9    Neurological disorder G98.8    Closed unstable burst fracture of fifth lumbar vertebra with routine healing S32.052D    Slow transit constipation K59.01    Major depressive disorder, recurrent, moderate ([facility]) F33.1    Acute deep vein thrombosis (DVT) of femoral vein of left lower extremity ([facility]) I82.412    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[facility]) M48.02, G99.2    Acute deep vein thrombosis (DVT) of right femoral vein ([facility]) I82.411    S/P cervical spinal fusion Z98.1    Gait instability R26.81    Cervical myelopathy ([facility]) G95.9     SURGICAL HISTORY       Past Surgical History:   Procedure Laterality Date    ABDOMEN SURGERY  1976    benign tumor removed near remaining ovary, 1.5 pounds    APPENDECTOMY  1968    appendix ruptured, developed peritonitis    BACK SURGERY      BUNIONECTOMY Left     along with calcium deposits removed   [address]  2005    negative    CERVICAL FUSION  05/21/2021    POSTERIOR C3-6 LAMINECTOMY, PARTIAL C7 LAMINECTOMY, FUSION C3-C6, SILVERCORD    CERVICAL FUSION N/A 5/21/2021    POSTERIOR C3-6 LAMINECTOMY, PARTIAL C7 LAMINECTOMY, FUSION C3-C6, SILVERCORD performed by Joesph Kirkpatrick DO at [address]    12 INCHES REMOVED D/T OBSTRUCTION    COLONOSCOPY      CYST REMOVAL Right     right facial    HYSTERECTOMY  1973    taken as a result of recurring cysts    LUMBAR FUSION N/A 02/10/2020    LUMBAR L4-5 POSTERIOR  DECOMPRESSION INSTRUMENTATION FUSION WCEMENT AUGMENTATION/ performed by Mary Pittman MD at [place] [address] N/A 06/17/2020    L5-S1 PLIF L4-L5 REVISION performed by Mary Pittman MD at [address] [place] [address] 08/14/2014    FESS    OVARY REMOVAL  1970    UNILATERAL due to cyst    OVARY REMOVAL  1971    partial, due to cyst    SINUS SURGERY  2004    UPPER GASTROINTESTINAL ENDOSCOPY N/A 05/31/2019    EGD ESOPHAGOGASTRODUODENOSCOPY performed by Kobi Ba MD at [facility] 08/05/2019    EGD BIOPSY performed by Ai Mcnair MD at [address] [place] [address] 08/23/2019    EGD BIOPSY performed by Kobi Ba MD at [address] [facility] 03/05/2019    WRIST OPEN REDUCTION INTERNAL FIXATION performed by Heriberto Valentin MD at [address] [place] [address]       Previous Medications    ACETAMINOPHEN (TYLENOL) 325 MG TABLET    Take 2 tablets by mouth every 4 hours as needed for Pain    ALBUTEROL-IPRATROPIUM (COMBIVENT RESPIMAT)  MCG/ACT AERS INHALER    Inhale 1 puff into the lungs every 6 hours as needed for Wheezing or Shortness of Breath    AMLODIPINE (NORVASC) 5 MG TABLET    Take 1 tablet by mouth daily    APIXABAN (ELIQUIS) 5 MG TABS TABLET    Take 1 tablet by mouth 2 times daily    ATORVASTATIN (LIPITOR) 40 MG TABLET    Take 1 tablet by mouth nightly    BLOOD GLUCOSE MONITOR STRIPS    Test 2 times a day & as needed for symptoms of irregular blood glucose.     BUSPIRONE (BUSPAR) 5 MG TABLET    Take 1 tablet by mouth 3 times daily    CALCIUM CARBONATE-VITAMIN D (CALCIUM 500/D) 500-125 MG-UNIT TABS    Take 1 tablet by mouth daily     CARVEDILOL (COREG) 12.5 MG TABLET    Take 1 tablet by mouth 2 times daily    CITALOPRAM (CELEXA) 20 MG TABLET    Take 1 tablet by mouth daily    CYANOCOBALAMIN ([employer] VITAMIN B12) 1000 MCG TABLET    Take 1 tablet by mouth daily    DOCUSATE SODIUM (COLACE) 100 MG CAPSULE    Take 1 capsule by mouth 2 times daily as needed for Constipation    FENOFIBRATE MICRONIZED (LOFIBRA) 134 MG CAPSULE    Take 1 capsule by mouth every morning (before breakfast)    FERROUS SULFATE (IRON 325) 325 (65 FE) MG TABLET    Take 1 tablet by mouth 2 times daily    FUROSEMIDE (LASIX) 20 MG TABLET    Take 1 tablet by mouth daily    GABAPENTIN (NEURONTIN) 100 MG CAPSULE    Take 2 capsules by mouth 2 times daily for 30 days. LANCETS MISC    1 each by Does not apply route daily    LIDOCAINE 4 % EXTERNAL PATCH    Place 1 patch onto the skin daily as needed (shoulder pain) Apply patch to shoulder. Do not place over chest/sternum. Patch may remain in place for up to 12 hours in any 24 hour period. LOSARTAN (COZAAR) 100 MG TABLET    Take 1 tablet by mouth daily    MELATONIN 3 MG TABS TABLET    Take 2 tablets by mouth nightly as needed (insomnia)    NITROGLYCERIN (NITROSTAT) 0.4 MG SL TABLET    Place 1 tablet under the tongue every 5 minutes as needed for Chest pain    PRAMIPEXOLE (MIRAPEX) 0.5 MG TABLET    Take 1 tablet by mouth nightly    VITAMIN D, ERGOCALCIFEROL, PO    Take by mouth     ALLERGIES     is allergic to bactrim [sulfamethoxazole-trimethoprim], codeine, diazepam, meperidine hcl, and seasonal.  FAMILY HISTORY     She indicated that her mother is . She indicated that her father is . She indicated that the status of her sister is unknown. She indicated that her brother is . She indicated that her maternal grandmother is . SOCIAL HISTORY       Social History     Tobacco Use    Smoking status: Former Smoker     Packs/day: 0.50     Years: 20.00     Pack years: 10.00     Types: Cigarettes     Start date: 1995     Quit date: 2017     Years since quittin.9    Smokeless tobacco: Never Used   Vaping Use    Vaping Use: Never used   Substance Use Topics    Alcohol use: No     Alcohol/week: 0.0 standard drinks    Drug use: No     PHYSICAL EXAM     INITIAL VITALS: BP (!) 128/58   Pulse 69   Temp 98.8 °F (37.1 °C)   Resp 15   Ht 5' 3\" (1.6 m)   Wt 180 lb (81.6 kg)   SpO2 97%   BMI 31.89 kg/m²    Physical Exam  Vitals and nursing note reviewed. Constitutional:       Appearance: She is normal weight. HENT:      Head: Normocephalic and atraumatic. Comments: Tenderness with palpation posterior parietal scalp  Eyes:      Extraocular Movements: Extraocular movements intact. Pupils: Pupils are equal, round, and reactive to light. Cardiovascular:      Rate and Rhythm: Normal rate and regular rhythm. Pulmonary:      Effort: Pulmonary effort is normal.      Breath sounds: Normal breath sounds. Abdominal:      General: Abdomen is flat. There is no distension. Palpations: There is no mass. Musculoskeletal:         General: No swelling. Normal range of motion. Cervical back: Normal range of motion and neck supple. Comments: Bilateral symmetric pitting lower extremity edema with chronic appearing anterior lower extremity wounds no significant erythema or warmth   Skin:     General: Skin is warm and dry. Neurological:      General: No focal deficit present. Mental Status: She is alert. Mental status is at baseline. Comments: Normal strength and sensation bilateral upper and lower extremities, cranial nerves II through XII intact, no ataxia on finger-nose-finger         MEDICAL DECISION MAKIN-year-old female presents for evaluation with multiple complaints.     Well's 4 for previously documented dvt, entire leg swollen, tenderness, pitting edema     Re-evaluated patient, unsteady on her feet, discussed with family, patient recently admitted to rehab facility, difficulty walking for the past several years, family caregiver recently had their own medical issues do not feel comfortable taking patient home, will start IV antibiotics for superimposed cellulitis on lower extremity edema and will admit to the hospital       CRITICAL CARE:       PROCEDURES:    Procedures    DIAGNOSTIC RESULTS   EKG:All EKG's are interpreted by the Emergency Department Physician who either signs or Co-signs this chart in the absence of a cardiologist.        RADIOLOGY:All plain film, CT, MRI, and formal ultrasound images (except ED bedside ultrasound) are read by the radiologist, see reports below, unless otherwisenoted in MDM or here. CT Cervical Spine WO Contrast   Final Result   No acute osseous abnormality of the cervical spine. No significant change   from prior exam         CT Head WO Contrast   Final Result   No acute intracranial abnormality. XR CHEST PORTABLE   Final Result   No acute process. VL Lower Extremity Bilateral Venous Duplex    (Results Pending)     LABS: All lab results were reviewed by myself, and all abnormals are listed below.   Labs Reviewed   CBC WITH AUTO DIFFERENTIAL - Abnormal; Notable for the following components:       Result Value    RBC 3.46 (*)     Hemoglobin 11.6 (*)     Hematocrit 32.7 (*)     Monocytes 9 (*)     Eosinophils % 9 (*)     Absolute Eos # 0.60 (*)     All other components within normal limits   URINALYSIS WITH MICROSCOPIC - Abnormal; Notable for the following components:    Bacteria, UA FEW (*)     All other components within normal limits   COMPREHENSIVE METABOLIC PANEL W/ REFLEX TO MG FOR LOW K - Abnormal; Notable for the following components:    Glucose 109 (*)     BUN 27 (*)     CREATININE 0.91 (*)     All other components within normal limits   TROPONIN - Abnormal; Notable for the following components:    Troponin, High Sensitivity 23 (*)     All other components within normal limits   BRAIN NATRIURETIC PEPTIDE       EMERGENCY DEPARTMENTCOURSE:         Vitals:    Vitals:    06/05/22 1650 06/05/22 1658 06/05/22 1713 06/05/22 1728   BP: 109/66 134/65 (!) 100/57 (!) 128/58   Pulse: 68 69 68 69   Resp: 18 15 16 15   Temp:       TempSrc:       SpO2:  97% 98% 97%   Weight:       Height:           The patient was given the following medications while in the emergency department:  Orders Placed This Encounter   Medications    acetaminophen (TYLENOL) tablet 650 mg    acetaminophen (TYLENOL) tablet 650 mg    doxycycline hyclate (VIBRA-TABS) 100 MG tablet     Sig: Take 1 tablet by mouth 2 times daily for 7 days     Dispense:  14 tablet     Refill:  0     CONSULTS:  PHARMACY TO DOSE VANCOMYCIN    FINAL IMPRESSION      1. Leg swelling          DISPOSITION/PLAN   DISPOSITION Decision To Admit 06/05/2022 09:12:10 PM      PATIENT REFERRED TO:  Mohamud Sykes MD  [address]  [phone]    Call   As needed    DISCHARGE MEDICATIONS:  New Prescriptions    DOXYCYCLINE HYCLATE (VIBRA-TABS) 100 MG TABLET    Take 1 tablet by mouth 2 times daily for 7 days     The care is provided during an unprecedented national emergency due to the novel coronavirus, COVID 19.   MD Yulia Mondragon MD  06/05/22 [id number]

## 2022-06-05 NOTE — ED TRIAGE NOTES
Pt states she has chronic swelling to her bilat legs but it has gotten worse and is drainage. Pt states she has a history blood clots.

## 2022-06-06 LAB
ANION GAP SERPL CALCULATED.3IONS-SCNC: 10 MMOL/L (ref 9–17)
BUN BLDV-MCNC: 22 MG/DL (ref 8–23)
CALCIUM SERPL-MCNC: 9.2 MG/DL (ref 8.6–10.4)
CHLORIDE BLD-SCNC: 109 MMOL/L (ref 98–107)
CO2: 24 MMOL/L (ref 20–31)
CREAT SERPL-MCNC: 0.92 MG/DL (ref 0.5–0.9)
GFR AFRICAN AMERICAN: >60 ML/MIN
GFR NON-AFRICAN AMERICAN: >60 ML/MIN
GFR SERPL CREATININE-BSD FRML MDRD: ABNORMAL ML/MIN/{1.73_M2}
GLUCOSE BLD-MCNC: 113 MG/DL (ref 65–105)
GLUCOSE BLD-MCNC: 119 MG/DL (ref 70–99)
HCT VFR BLD CALC: 32 % (ref 36–46)
HEMOGLOBIN: 10.8 G/DL (ref 12–16)
MCH RBC QN AUTO: 31.8 PG (ref 26–34)
MCHC RBC AUTO-ENTMCNC: 33.6 G/DL (ref 31–37)
MCV RBC AUTO: 94.6 FL (ref 80–100)
PDW BLD-RTO: 13.5 % (ref 11.5–14.9)
PLATELET # BLD: 328 K/UL (ref 150–450)
PMV BLD AUTO: 7.2 FL (ref 6–12)
POTASSIUM SERPL-SCNC: 4.1 MMOL/L (ref 3.7–5.3)
RBC # BLD: 3.39 M/UL (ref 4–5.2)
SODIUM BLD-SCNC: 143 MMOL/L (ref 135–144)
WBC # BLD: 6.4 K/UL (ref 3.5–11)

## 2022-06-06 PROCEDURE — 85027 COMPLETE CBC AUTOMATED: CPT

## 2022-06-06 PROCEDURE — 6360000002 HC RX W HCPCS: Performed by: STUDENT IN AN ORGANIZED HEALTH CARE EDUCATION/TRAINING PROGRAM

## 2022-06-06 PROCEDURE — 2580000003 HC RX 258: Performed by: STUDENT IN AN ORGANIZED HEALTH CARE EDUCATION/TRAINING PROGRAM

## 2022-06-06 PROCEDURE — 82947 ASSAY GLUCOSE BLOOD QUANT: CPT

## 2022-06-06 PROCEDURE — 80048 BASIC METABOLIC PNL TOTAL CA: CPT

## 2022-06-06 PROCEDURE — 6370000000 HC RX 637 (ALT 250 FOR IP): Performed by: NURSE PRACTITIONER

## 2022-06-06 PROCEDURE — 1200000000 HC SEMI PRIVATE

## 2022-06-06 PROCEDURE — 97162 PT EVAL MOD COMPLEX 30 MIN: CPT

## 2022-06-06 PROCEDURE — 36415 COLL VENOUS BLD VENIPUNCTURE: CPT

## 2022-06-06 PROCEDURE — 2580000003 HC RX 258: Performed by: NURSE PRACTITIONER

## 2022-06-06 PROCEDURE — 6360000002 HC RX W HCPCS: Performed by: NURSE PRACTITIONER

## 2022-06-06 RX ADMIN — CARVEDILOL 12.5 MG: 12.5 TABLET, FILM COATED ORAL at 08:33

## 2022-06-06 RX ADMIN — PRAMIPEXOLE DIHYDROCHLORIDE 0.5 MG: 0.25 TABLET ORAL at 20:51

## 2022-06-06 RX ADMIN — ATORVASTATIN CALCIUM 40 MG: 40 TABLET, FILM COATED ORAL at 20:49

## 2022-06-06 RX ADMIN — CITALOPRAM HYDROBROMIDE 20 MG: 20 TABLET ORAL at 08:33

## 2022-06-06 RX ADMIN — ACETAMINOPHEN 650 MG: 325 TABLET ORAL at 10:46

## 2022-06-06 RX ADMIN — BUSPIRONE HYDROCHLORIDE 5 MG: 5 TABLET ORAL at 08:33

## 2022-06-06 RX ADMIN — GABAPENTIN 200 MG: 100 CAPSULE ORAL at 20:49

## 2022-06-06 RX ADMIN — CARVEDILOL 12.5 MG: 12.5 TABLET, FILM COATED ORAL at 20:49

## 2022-06-06 RX ADMIN — FENOFIBRATE 160 MG: 160 TABLET ORAL at 08:33

## 2022-06-06 RX ADMIN — GABAPENTIN 200 MG: 100 CAPSULE ORAL at 08:33

## 2022-06-06 RX ADMIN — CYANOCOBALAMIN TAB 1000 MCG 1000 MCG: 1000 TAB at 20:49

## 2022-06-06 RX ADMIN — BUSPIRONE HYDROCHLORIDE 5 MG: 5 TABLET ORAL at 15:35

## 2022-06-06 RX ADMIN — BUSPIRONE HYDROCHLORIDE 5 MG: 5 TABLET ORAL at 20:49

## 2022-06-06 RX ADMIN — FUROSEMIDE 20 MG: 10 INJECTION, SOLUTION INTRAMUSCULAR; INTRAVENOUS at 08:33

## 2022-06-06 RX ADMIN — FERROUS SULFATE TAB 325 MG (65 MG ELEMENTAL FE) 325 MG: 325 (65 FE) TAB at 20:49

## 2022-06-06 RX ADMIN — ACETAMINOPHEN 650 MG: 325 TABLET ORAL at 20:49

## 2022-06-06 RX ADMIN — APIXABAN 5 MG: 5 TABLET, FILM COATED ORAL at 20:50

## 2022-06-06 RX ADMIN — VANCOMYCIN HYDROCHLORIDE 1250 MG: 1.25 INJECTION, POWDER, LYOPHILIZED, FOR SOLUTION INTRAVENOUS at 23:08

## 2022-06-06 RX ADMIN — APIXABAN 5 MG: 5 TABLET, FILM COATED ORAL at 08:32

## 2022-06-06 RX ADMIN — AMLODIPINE BESYLATE 5 MG: 5 TABLET ORAL at 08:33

## 2022-06-06 RX ADMIN — SODIUM CHLORIDE, PRESERVATIVE FREE 10 ML: 5 INJECTION INTRAVENOUS at 20:50

## 2022-06-06 RX ADMIN — SODIUM CHLORIDE, PRESERVATIVE FREE 10 ML: 5 INJECTION INTRAVENOUS at 08:33

## 2022-06-06 RX ADMIN — FERROUS SULFATE TAB 325 MG (65 MG ELEMENTAL FE) 325 MG: 325 (65 FE) TAB at 08:33

## 2022-06-06 RX ADMIN — CALCIUM CARBONATE 600 MG (1,500 MG)-VITAMIN D3 400 UNIT TABLET 1 TABLET: at 20:50

## 2022-06-06 ASSESSMENT — PAIN - FUNCTIONAL ASSESSMENT: PAIN_FUNCTIONAL_ASSESSMENT: ACTIVITIES ARE NOT PREVENTED

## 2022-06-06 ASSESSMENT — PAIN SCALES - GENERAL
PAINLEVEL_OUTOF10: 8
PAINLEVEL_OUTOF10: 8
PAINLEVEL_OUTOF10: 7

## 2022-06-06 ASSESSMENT — PAIN DESCRIPTION - ORIENTATION: ORIENTATION: POSTERIOR

## 2022-06-06 ASSESSMENT — PAIN DESCRIPTION - DESCRIPTORS: DESCRIPTORS: ACHING

## 2022-06-06 ASSESSMENT — PAIN DESCRIPTION - LOCATION: LOCATION: HEAD

## 2022-06-06 NOTE — PROGRESS NOTES
Physical Therapy  Facility/Department: [facility]  Physical Therapy Initial Assessment    Name: Tomi Frias  : 1950  MRN: [id number]  Date of Service: 2022    Discharge Recommendations:  Patient would benefit from continued therapy after discharge,Therapy recommended at discharge   PT Equipment Recommendations  Equipment Needed: No (pt reports having rollator)      Patient Diagnosis(es): The encounter diagnosis was Leg swelling. Past Medical History:  has a past medical history of Allergic rhinitis, cause unspecified, Back pain, Bowel obstruction (HCC), C. difficile diarrhea, CAD (coronary artery disease), Cardiac murmur, Cellulitis, Cellulitis, Cerebral artery occlusion with cerebral infarction ([address] COVID-19, Diverticulosis of colon (without mention of hemorrhage), GERD (gastroesophageal reflux disease), GERD (gastroesophageal reflux disease), History of blood transfusion, History of CHF (congestive heart failure), History of MI (myocardial infarction), History of ovarian cyst, History of peritonitis, HTN (hypertension), Hx of blood clots, Hyperlipidemia, Intestinal or peritoneal adhesions with obstruction (postoperative) (postinfection) ([address] Kidney infection, Lateral epicondylitis  of elbow, MDRO (multiple drug resistant organisms) resistance, Muscle strain, Other abnormal glucose, PONV (postoperative nausea and vomiting), Pre-diabetes, Restless legs syndrome (RLS), Snores, 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[address] TIA (transient ischemic attack), Uses walker, Vitamin D deficiency, Wears glasses, and Wellness examination. Past Surgical History:  has a past surgical history that includes Ovary removal (); colectomy (); Appendectomy (); Ovary removal (); Hysterectomy (); Bunionectomy (Left); sinus surgery (); Colonoscopy; other surgical history (2014); cyst removal (Right); Wrist fracture surgery (Left, 2019);  Upper gastrointestinal endoscopy (N/A, 05/31/2019); Upper gastrointestinal endoscopy (N/A, 08/05/2019); Upper gastrointestinal endoscopy (N/A, 08/23/2019); Abdomen surgery (1976); lumbar fusion (N/A, 02/10/2020); Cardiac catheterization; Cardiac catheterization (2005); [place] tooth extraction; lumbar fusion (N/A, 06/17/2020); back surgery; cervical fusion (05/21/2021); and cervical fusion (N/A, 5/21/2021). Assessment   Body Structures, Functions, Activity Limitations Requiring Skilled Therapeutic Intervention: Decreased functional mobility ; Decreased ADL status; Decreased ROM; Decreased strength;Decreased safe awareness;Decreased balance;Decreased endurance;Decreased coordination;Decreased posture  Assessment: Pt co-morbidites, PLOF, recent falls, and gait deviations allow for a fair PT prognosis. The pt is Mod I for bed mobility with increased time and heavy use of bed rails, sit<>stand Minax1 with standard walker, gait of 12ft MinAx1 standard walker decreased knee and hip flexion of L LE and slowed tawanda. Treatment Diagnosis: Impaired functional mobility secondary to ataxia  Specific Instructions for Next Treatment: improve strength, progress in distance ambulating, progress to 8\"step, improve dynamic balance and overall gait. Recommend using RW next visit. Therapy Prognosis: Fair  Decision Making: Medium Complexity  History: pt was admitted due to fall and hit head along with sores on bilateral legs  Exam: ROM, strength, functional mobility  Clinical Presentation: The pt is Mod I for bed mobility with increased time and heavy use of bed rails, sit<>stand Minax1 with standard walker, gait of 12ft MinAx1 standard walker decreased knee and hip flexion of L LE and slowed tawanda.   Barriers to Learning: none  Requires PT Follow-Up: Yes  Activity Tolerance  Activity Tolerance: Patient limited by fatigue;Patient limited by endurance     Plan   Plan  Plan: 5-7 times per week  Specific Instructions for Next Treatment: improve strength, progress in distance ambulating, progress to 8\"step, improve dynamic balance and overall gait. Recommend using RW next visit.   Current Treatment Recommendations: Strengthening,Balance training,Functional mobility training,Transfer training,Endurance training,Stair training,Gait training,Safety education & training,Therapeutic activities  Safety Devices  Type of Devices: Gait belt,Call light within reach,Patient at risk for falls,Left in chair,Chair alarm in place,Nurse notified,All fall risk precautions in place (nurse Aminata Garay notified)     Restrictions  Restrictions/Precautions  Restrictions/Precautions: Fall Risk (peripheral IV R anterior forearm and L antecubital)  Required Braces or Orthoses?: No  Implants present? : Metal implants (stu in back, stu in wrist , neck has screws)  Position Activity Restriction  Other position/activity restrictions: up with assist     Subjective   Pain: no pain reported  General  Patient assessed for rehabilitation services?: Yes  Response To Previous Treatment: Not applicable  Family / Caregiver Present: No  Referring Practitioner: Ligia LI  Referral Date : 06/05/22  Diagnosis: cellulitis of both lower extremities and recent fall  Follows Commands: Within Functional Limits  General Comment  Comments: okayed to proceed with PT eval per nurse Monet Valentin  Subjective  Subjective: pt reports 5 falls in the last week         Social/Functional History  Social/Functional History  Lives With: Spouse  Type of Home: House  Home Layout: One level  Home Access: Stairs to enter with rails,Stairs to enter without rails  Entrance Stairs - Number of Steps: 1  Entrance Stairs - Rails: None  Bathroom Shower/Tub: Tub/Shower unit,Shower chair with back  Bathroom Toilet: Handicap height  Bathroom Equipment: Grab bars in shower,Shower chair,Hand-held shower  Bathroom Accessibility: Accessible  Home Equipment: Claudia Hinojosa  Has the patient had two or more falls in the past year or any fall with injury in the past year?: Yes (5 falls in one week)  ADL Assistance: Needs assistance  Bath:  (dter comes to help bathe)  Homemaking Assistance: Independent  Homemaking Responsibilities: Yes (SO does grocery shopping, clening lady comes every other week)  Ambulation Assistance: Independent (walker used)  Transfer Assistance: Independent  Active : No  Patient's  Info: spouse  Mode of Transportation: Lucinda Ricks  Occupation: Retired  Type of Occupation:   IADL Comments: hospital bed and lift chair  Additional Comments: daughter lives near by (does laundry, get pills ready, and helps shower) comes Monday and Thursday.  7 grand children  Vision/Hearing  Vision  Vision: Impaired  Vision Exceptions: Wears glasses for reading  Hearing  Hearing: Within functional limits    Cognition   Orientation  Overall Orientation Status: Within Normal Limits  Orientation Level: Oriented X4  Cognition  Overall Cognitive Status: WNL     Objective     O2 Device: None (Room air)     Observation/Palpation  Posture: Poor  Observation: peripheral IV R antecubital and L antecubital, pt was sleeping with HOB elevated when entering the room  Scar: wounds on bilateral LE  Gross Assessment  Sensation: Intact     AROM RLE (degrees)  RLE AROM: WFL  AROM LLE (degrees)  LLE AROM : WFL  AROM RUE (degrees)  RUE General AROM: look OT assessment on UE  AROM LUE (degrees)  LUE General AROM: look OT assessment on UE  Strength RLE  Strength RLE: Exception  R Hip Flexion: 3+/5  R Knee Flexion: 3/5  R Knee Extension: 3+/5  R Ankle Dorsiflexion: 3+/5  R Ankle Plantar flexion: 3+/5  Strength LLE  Strength LLE: Exception  L Hip Flexion: 3/5  L Knee Flexion: 3/5  L Knee Extension: 3-/5  L Ankle Dorsiflexion: 3+/5  L Ankle Plantar Flexion: 3-/5  Strength RUE  Comment: look OT assessment on UE  Strength LUE  Comment: look OT assessment on UE           Bed mobility  Rolling to Right: Modified independent (Heavy use of bed rails and increased time to complete)  Supine to Sit: Modified independent  Bed Mobility Comments: Heavy use of bed rails and increased time to complete, HOB slightly elevated  Transfers  Sit to Stand: Minimal Assistance (standard walker used)  Stand to sit: Minimal Assistance (standard walker used)  Comment: standard walker used (but uses rolling walker at home)  Ambulation  Surface: level tile  Device: Standard Walker (uses RW at home)  Assistance: Minimal assistance  Quality of Gait: Pt does not fully WB on L LE, decreased knee and hip flexion on L LE and increased lateral R sway. Pt appears to be constantly be swayed to the right. Gait Deviations: Slow Tawanda;Deviated path;Decreased step length;Shuffles  Distance: 12ft  Comments: Pt needs increase time for amb due to slow tawanda. Pt was sliding standard walker like a RW. More Ambulation?: No  Stairs/Curb  Stairs?: No     Balance  Posture: Fair  Sitting - Static: Fair  Sitting - Dynamic: Poor;+  Standing - Static: Poor; - (standard walker used)  Standing - Dynamic: Poor; - (standard walker used)           OutComes Score                                                  -PAC Score  -PAC Inpatient Mobility Raw Score : 18 (06/06/22 1355)  AM-PAC Inpatient T-Scale Score : 43.63 (06/06/22 [facility])  Mobility Inpatient CMS 0-100% Score: 46.58 (06/06/22 [facility])  Mobility Inpatient CMS G-Code Modifier : CK (06/06/22 [facility])          Goals  Short Term Goals  Time Frame for Short term goals: 5-7 days  Short term goal 1: Pt will tolerate one thirty minute therapeutic exercise session to show improvment in activity tolerance. Short term goal 2: Pt will improve one half MMT grade on L LE to show improvement in strength. Short term goal 3: Pt will be able to to half tandem stance with L LE posterior for 1-2 minutes to show ability to WB and maintain balance.   Short term goal 4: Pt will be able to ambulate one 8\" step with no HR and least restrictive device and MinAx1 to be able to enter home. Short term goal 5: Pt will be able to ambulate 50-60ft with least restrictive device and CGAx1 to show improvement for distance walked. Additional Goals?: Yes  Short Term Goal 6: Pt will achieve a sit<>stand with CGAx1 with least restrictive device to show improvement in overall transfers. Patient Goals   Patient goals : to return home to spouse       Education  Patient Education  Education Given To: Patient  Education Provided: Role of Therapy; Energy Conservation;Transfer Training;Equipment  Education Method: Demonstration;Verbal  Education Outcome: Verbalized understanding;Continued education needed      Therapy Time   Individual Concurrent Group Co-treatment   Time In 1355         Time Out 2211         Minutes Denia Johnson    PT evaluation/treatment is completed by SALEEM Collado under the direct supervision of co-signing therapist.  The above documentation completed by Student Physical Therapist Sonam Moseley  was reviewed and accepted by the supervising Clinical instructor [facility], PT.

## 2022-06-06 NOTE — FLOWSHEET NOTE
Pt is in a lot of pain. Pt expressed \" I have been through a lot in these past two years\"   Pt shared about previous surgeries and the loss of her son and sister. Pt feeling discouraged but responded positivly  to conversation and prayer. Pt was brought comfort by having a space and time to share and process. 06/06/22 1250   Encounter Summary   Encounter Overview/Reason  Initial Encounter;Spiritual/Emotional Needs   Service Provided For: Patient   Referral/Consult From: Rounding   Support System Children;Spouse   Last Encounter  06/06/22   Complexity of Encounter Moderate   Begin Time 1200   End Time  1251   Total Time Calculated 51 min   Encounter    Type Initial Screen/Assessment   Spiritual/Emotional needs   Type Spiritual Support   Assessment/Intervention/Outcome   Assessment Anxious; Compromised coping;Complicated grieving;Loneliness; Powerlessness; Tearful;Stress overload   Intervention Active listening;Discussed belief system/Baptism practices/gautam;Discussed illness injury and its impact; Explored/Affirmed feelings, thoughts, concerns;Explored Coping Skills/Resources;Grief Care;Nurtured Hope;Prayer (assurance of)/[place];Sustaining Presence/Ministry of presence   Outcome Comfort;Coping;Encouraged;Engaged in conversation;Expressed feelings, needs, and concerns;Expressed Gratitude; Less anxious, Less agitated;New perspective/awareness;Peace; Receptive

## 2022-06-06 NOTE — PLAN OF CARE
Problem: Discharge Planning  Goal: Discharge to home or other facility with appropriate resources  Outcome: Progressing  Note: Plan is for patient to discharge home. Problem: Skin/Tissue Integrity  Goal: Absence of new skin breakdown  Description: 1. Monitor for areas of redness and/or skin breakdown  2. Assess vascular access sites hourly  3. Every 4-6 hours minimum:  Change oxygen saturation probe site  4. Every 4-6 hours:  If on nasal continuous positive airway pressure, respiratory therapy assess nares and determine need for appliance change or resting period. Outcome: Progressing     Problem: Safety - Adult  Goal: Free from fall injury  Outcome: Progressing  Note: Patient is alert and oriented and able to make needs known.      Problem: Chronic Conditions and Co-morbidities  Goal: Patient's chronic conditions and co-morbidity symptoms are monitored and maintained or improved  Outcome: Progressing

## 2022-06-06 NOTE — ACP (ADVANCE CARE PLANNING)
Advance Care Planning     Advance Care Planning Activator (Inpatient)  Conversation Note      Date of ACP Conversation: 6/6/2022     Conversation Conducted with: Patient with Decision Making Capacity    ACP Activator: Liliam German RN        Health Care Decision Maker:     Current Designated Health Care Decision Maker:     Primary Decision Maker: Mecca Jacobs - [phone]  Click here to complete Healthcare Decision Makers including section of the Healthcare Decision Maker Relationship (ie \"Primary\")  Today we documented Decision Maker(s) consistent with Legal Next of Kin hierarchy. Care Preferences    Ventilation: \"If you were in your present state of health and suddenly became very ill and were unable to breathe on your own, what would your preference be about the use of a ventilator (breathing machine) if it were available to you? \"      Would the patient desire the use of ventilator (breathing machine)?: yes    \"If your health worsens and it becomes clear that your chance of recovery is unlikely, what would your preference be about the use of a ventilator (breathing machine) if it were available to you? \"     Would the patient desire the use of ventilator (breathing machine)?: Yes      Resuscitation  \"CPR works best to restart the heart when there is a sudden event, like a heart attack, in someone who is otherwise healthy. Unfortunately, CPR does not typically restart the heart for people who have serious health conditions or who are very sick. \"    \"In the event your heart stopped as a result of an underlying serious health condition, would you want attempts to be made to restart your heart (answer \"yes\" for attempt to resuscitate) or would you prefer a natural death (answer \"no\" for do not attempt to resuscitate)? \" yes       [x] Yes   [] No   Educated Patient / Laly Smalls regarding differences between Advance Directives and portable DNR orders.     Length of ACP Conversation in minutes: Conversation Outcomes:  [x] ACP discussion completed  [] Existing advance directive reviewed with patient; no changes to patient's previously recorded wishes  [] New Advance Directive completed  [] Portable Do Not Rescitate prepared for Provider review and signature  [] POLST/POST/MOLST/MOST prepared for Provider review and signature      Follow-up plan:    [] Schedule follow-up conversation to continue planning  [] Referred individual to Provider for additional questions/concerns   [] Advised patient/agent/surrogate to review completed ACP document and update if needed with changes in condition, patient preferences or care setting    [] This note routed to one or more involved healthcare providers    {

## 2022-06-06 NOTE — PROGRESS NOTES
Vancomycin Dosing by Pharmacy - Initial Note   TODAY'S DATE:  6/5/2022  Patient name, age:  Peng Carson, 71 y.o. Indication: SSTI, MRSA suspected. Additional antimicrobials: Allergies:  Bactrim [sulfamethoxazole-trimethoprim], Codeine, Diazepam, Meperidine hcl, and Seasonal   Actual Weight:    Wt Readings from Last 1 Encounters:   06/05/22 180 lb (81.6 kg)     Labs/Ancillary Data  Estimated Creatinine Clearance: 57 mL/min (A) (based on SCr of 0.91 mg/dL (H)). Recent Labs     06/05/22  1410 06/05/22  1515   CREATININE  --  0.91*   BUN  --  27*   WBC 6.6  --      No results found for: PROCAL  No intake or output data in the 24 hours ending 06/05/22 2236  Temp: 98.8    Recent vancomycin administrations        No vancomycin IV orders with administrations found. Orders not given:            vancomycin (VANCOCIN) intermittent dosing (placeholder)    vancomycin (VANCOCIN) 2,000 mg in dextrose 5 % 500 mL IVPB    vancomycin (VANCOCIN) 1,250 mg in dextrose 5 % 250 mL IVPB (ADDAVIAL)                  Culture Date / Source  /  Results  See micro    MRSA Nasal Swab: N/A. Non-respiratory infection. Anita Rodney PLAN   Initial loading dose of 25mg/kg (max of 2,500 mg) = 2000  mg  x 1, then  vancomycin 1250 mg IV every 24 hours. Ensured BUN/sCr ordered at baseline and every 48 hours x at least 3 levels, then at least weekly. Vancomycin level ordered for 06/07/2022 @ 0600. Will use bayesian method for dosing. This level will not be a trough. Target AUC/JOJO 400-500, with trough goal estimate of 10-15.       Vancomycin Target Concentration Parameters  Treatment  Population Target AUC/JOJO Target Trough   Invasive MRSA Infection (bacteremia, pneumonia, meningitis, endocarditis, osteomyelitis)  Sepsis (undifferentiated) 400-600 N/A   Infection due to non-MRSA pathogen  Empiric treatment of non-invasive MRSA infection  (SSTI, UTI) <500 10-15 mg/L   CrCl < 29 mL/min  Rapidly fluctuating serum creatinine   BIN N/A < 15 mg/L     Renal replacement therapy is dosed by levels, per hospital protocol. Abbreviations  * Pauc: probability that AUC is >400 (efficacy); Pconc: probability that Ctrough is above 20 ?g/mL (toxicity); Tox: Probability of nephrotoxicity, based on Lodmaureen et al. Clin Infect Dis 2009. Loading dose: 2000 mg at 23:00 06/05/2022. Regimen: 1250 mg IV every 24 hours. Start time: 22:30 on 06/05/2022  Exposure target: Ctrough10-15 mg/L   AUC24,ss: 408 mg/L.hr  Probability of AUC24 > 400: 52 %  Ctrough,ss: 11.5 mg/L  Probability of Ctrough,ss > 20: 11 %  Probability of nephrotoxicity (Monse VERONICA 2009): 7 %    Thank you for the consult. Pharmacy will continue to follow.      Kinsey Sabillon, [facility]     -6/5/2022 at 10:39 PM

## 2022-06-06 NOTE — ED NOTES
Report given to Deshaun Devine RN from [employer]. Report method by phone   The following was reviewed with receiving RN:   Current vital signs:  BP (!) 140/55   Pulse 69   Temp 98.8 °F (37.1 °C)   Resp 15   Ht 5' 3\" (1.6 m)   Wt 180 lb (81.6 kg)   SpO2 97%   BMI 31.89 kg/m²                MEWS Score: 1     Any medication or safety alerts were reviewed. Any pending diagnostics and notifications were also reviewed, as well as any safety concerns or issues, abnormal labs, abnormal imaging, and abnormal assessment findings. Questions were answered.             Terrance Cloud RN  06/05/22 4712

## 2022-06-06 NOTE — CARE COORDINATION
CASE MANAGEMENT NOTE:    Admission Date:  6/5/2022 Tejas Green is a 71 y.o.  female    Admitted for : Leg swelling [M79.89]  Cellulitis of both lower extremities [L03.115, L03.116]    Met with:  Patient    PCP:  Luke Pean:  [address]      Is patient alert and oriented at time of discussion:  Yes    Current Residence/ Living Arrangements:  independently at home wth spouse in 1 story home . Current Services PTA:  Yes- [facility] V[facility]    Does patient go to outpatient dialysis: No  If yes, location and chair time: NA    Is patient agreeable to VNS: Yes    Freedom of choice provided:  Yes    List of [address] provided: No    VNS chosen:  Yes- [facility] [facility]     DME:  straight cane, walker, shower chair , GB, raised toilet seat with handles, hospital bed and lift chair    Home Oxygen: No    Nebulizer: Yes    CPAP/BIPAP: No    Supplier: N/A    Potential Assistance Needed: No    SNF needed: No    Freedom of choice and list provided: No    [facility]       Is patient currently receiving oral anticoagulation therapy? Yes- ELiquis PTA for dvt    Is the Patient an [facility] with Readmission Risk Score greater than 14%? Yes  If yes, pt needs a follow up appointment made within 7 days. Family Members/Caregivers that pt would like involved in their care:    Yes    If yes, list name here:  Roger Stoll and dtr [facility] Provider:  Family             Discharge Plan:  6/6/22 [employer] MEDICARE From home with spouse in 1 story home. DME:straight cane, walker, shower chair , GB, raised toilet seat with handles, hospital bed and lift chair VNS: Current with [address], referral sent . Recent d/c from ARU 4/8 . Eliquis PTA for DVT. IV Vanco and Lasix, PT/OT eval. ORANGE HEADER 21 %. ANNALEE NEEDS TO BE SIGNED/COMPLETED.  Following for needs//JF             Electronically signed by: Jona Mcdaniel RN on 6/6/2022 at 10:04 AM

## 2022-06-07 LAB
CREAT SERPL-MCNC: 0.86 MG/DL (ref 0.5–0.9)
GFR AFRICAN AMERICAN: >60 ML/MIN
GFR NON-AFRICAN AMERICAN: >60 ML/MIN
GFR SERPL CREATININE-BSD FRML MDRD: NORMAL ML/MIN/{1.73_M2}
HCT VFR BLD CALC: 35.4 % (ref 36–46)
HEMOGLOBIN: 11.7 G/DL (ref 12–16)
MCH RBC QN AUTO: 31.5 PG (ref 26–34)
MCHC RBC AUTO-ENTMCNC: 33 G/DL (ref 31–37)
MCV RBC AUTO: 95.4 FL (ref 80–100)
PDW BLD-RTO: 13.6 % (ref 11.5–14.9)
PLATELET # BLD: 378 K/UL (ref 150–450)
PMV BLD AUTO: 6.8 FL (ref 6–12)
RBC # BLD: 3.71 M/UL (ref 4–5.2)
VANCOMYCIN RANDOM DATE LAST DOSE: NORMAL
VANCOMYCIN RANDOM DOSE AMOUNT: 1250
VANCOMYCIN RANDOM TIME LAST DOSE: 2308
VANCOMYCIN RANDOM: 22.2 UG/ML
WBC # BLD: 5.6 K/UL (ref 3.5–11)

## 2022-06-07 PROCEDURE — 6370000000 HC RX 637 (ALT 250 FOR IP): Performed by: NURSE PRACTITIONER

## 2022-06-07 PROCEDURE — 1200000000 HC SEMI PRIVATE

## 2022-06-07 PROCEDURE — 36415 COLL VENOUS BLD VENIPUNCTURE: CPT

## 2022-06-07 PROCEDURE — 99232 SBSQ HOSP IP/OBS MODERATE 35: CPT | Performed by: INTERNAL MEDICINE

## 2022-06-07 PROCEDURE — 85027 COMPLETE CBC AUTOMATED: CPT

## 2022-06-07 PROCEDURE — 6360000002 HC RX W HCPCS: Performed by: NURSE PRACTITIONER

## 2022-06-07 PROCEDURE — 97166 OT EVAL MOD COMPLEX 45 MIN: CPT

## 2022-06-07 PROCEDURE — 97535 SELF CARE MNGMENT TRAINING: CPT

## 2022-06-07 PROCEDURE — 97116 GAIT TRAINING THERAPY: CPT

## 2022-06-07 PROCEDURE — 2580000003 HC RX 258: Performed by: NURSE PRACTITIONER

## 2022-06-07 PROCEDURE — 97110 THERAPEUTIC EXERCISES: CPT

## 2022-06-07 PROCEDURE — 6370000000 HC RX 637 (ALT 250 FOR IP): Performed by: STUDENT IN AN ORGANIZED HEALTH CARE EDUCATION/TRAINING PROGRAM

## 2022-06-07 PROCEDURE — 82565 ASSAY OF CREATININE: CPT

## 2022-06-07 PROCEDURE — 80202 ASSAY OF VANCOMYCIN: CPT

## 2022-06-07 RX ORDER — FUROSEMIDE 20 MG/1
20 TABLET ORAL DAILY
Status: DISCONTINUED | OUTPATIENT
Start: 2022-06-08 | End: 2022-06-14 | Stop reason: HOSPADM

## 2022-06-07 RX ORDER — DOXYCYCLINE 100 MG/1
100 CAPSULE ORAL EVERY 12 HOURS SCHEDULED
Status: COMPLETED | OUTPATIENT
Start: 2022-06-07 | End: 2022-06-14

## 2022-06-07 RX ADMIN — GABAPENTIN 200 MG: 100 CAPSULE ORAL at 21:08

## 2022-06-07 RX ADMIN — Medication 6 MG: at 21:08

## 2022-06-07 RX ADMIN — FERROUS SULFATE TAB 325 MG (65 MG ELEMENTAL FE) 325 MG: 325 (65 FE) TAB at 08:36

## 2022-06-07 RX ADMIN — CYANOCOBALAMIN TAB 1000 MCG 1000 MCG: 1000 TAB at 21:08

## 2022-06-07 RX ADMIN — CALCIUM CARBONATE 600 MG (1,500 MG)-VITAMIN D3 400 UNIT TABLET 1 TABLET: at 21:08

## 2022-06-07 RX ADMIN — CARVEDILOL 12.5 MG: 12.5 TABLET, FILM COATED ORAL at 08:36

## 2022-06-07 RX ADMIN — APIXABAN 5 MG: 5 TABLET, FILM COATED ORAL at 08:36

## 2022-06-07 RX ADMIN — APIXABAN 5 MG: 5 TABLET, FILM COATED ORAL at 21:08

## 2022-06-07 RX ADMIN — DOXYCYCLINE 100 MG: 100 CAPSULE ORAL at 12:50

## 2022-06-07 RX ADMIN — BUSPIRONE HYDROCHLORIDE 5 MG: 5 TABLET ORAL at 08:36

## 2022-06-07 RX ADMIN — AMLODIPINE BESYLATE 5 MG: 5 TABLET ORAL at 08:36

## 2022-06-07 RX ADMIN — BUSPIRONE HYDROCHLORIDE 5 MG: 5 TABLET ORAL at 21:08

## 2022-06-07 RX ADMIN — SODIUM CHLORIDE, PRESERVATIVE FREE 10 ML: 5 INJECTION INTRAVENOUS at 21:23

## 2022-06-07 RX ADMIN — ATORVASTATIN CALCIUM 40 MG: 40 TABLET, FILM COATED ORAL at 21:08

## 2022-06-07 RX ADMIN — FERROUS SULFATE TAB 325 MG (65 MG ELEMENTAL FE) 325 MG: 325 (65 FE) TAB at 21:21

## 2022-06-07 RX ADMIN — BUSPIRONE HYDROCHLORIDE 5 MG: 5 TABLET ORAL at 14:42

## 2022-06-07 RX ADMIN — FUROSEMIDE 20 MG: 10 INJECTION, SOLUTION INTRAMUSCULAR; INTRAVENOUS at 08:36

## 2022-06-07 RX ADMIN — FENOFIBRATE 160 MG: 160 TABLET ORAL at 08:36

## 2022-06-07 RX ADMIN — CITALOPRAM HYDROBROMIDE 20 MG: 20 TABLET ORAL at 08:36

## 2022-06-07 RX ADMIN — ACETAMINOPHEN 650 MG: 325 TABLET ORAL at 21:07

## 2022-06-07 RX ADMIN — CARVEDILOL 12.5 MG: 12.5 TABLET, FILM COATED ORAL at 21:08

## 2022-06-07 RX ADMIN — SODIUM CHLORIDE, PRESERVATIVE FREE 10 ML: 5 INJECTION INTRAVENOUS at 08:37

## 2022-06-07 RX ADMIN — PRAMIPEXOLE DIHYDROCHLORIDE 0.5 MG: 0.25 TABLET ORAL at 21:21

## 2022-06-07 RX ADMIN — ACETAMINOPHEN 650 MG: 325 TABLET ORAL at 04:07

## 2022-06-07 RX ADMIN — GABAPENTIN 200 MG: 100 CAPSULE ORAL at 08:36

## 2022-06-07 ASSESSMENT — PAIN DESCRIPTION - LOCATION
LOCATION: HEAD
LOCATION: HEAD

## 2022-06-07 ASSESSMENT — PAIN SCALES - GENERAL
PAINLEVEL_OUTOF10: 8
PAINLEVEL_OUTOF10: 7
PAINLEVEL_OUTOF10: 5
PAINLEVEL_OUTOF10: 0
PAINLEVEL_OUTOF10: 7

## 2022-06-07 ASSESSMENT — PAIN DESCRIPTION - ORIENTATION: ORIENTATION: POSTERIOR

## 2022-06-07 ASSESSMENT — PAIN DESCRIPTION - DESCRIPTORS: DESCRIPTORS: ACHING

## 2022-06-07 NOTE — DISCHARGE INSTR - COC
Continuity of Care Form    Patient Name: Perry Espinoza   :  1950  MRN:  [id number]    Admit date:  2022  Discharge date:  ***    Code Status Order: Full Code   Advance Directives:      Admitting Physician:  Lucien Mancilla MD  PCP: Gregoria Xiao MD    Discharging Nurse: [facility] Unit/Room#: 2064/2064-01  Discharging Unit Phone Number: ***    Emergency Contact:   Extended Emergency Contact Information  Primary Emergency Contact: Tristian Lynchmaida  Address: [address] [place] [address] [address], [address] [place] [address] Phone: [phone]  Mobile Phone: [phone]  Relation: Spouse  Hearing or visual needs: None  Other needs: None  Preferred language: English   needed?  No  Secondary Emergency Contact: Blanca   Home Phone: [phone]  Mobile Phone: [phone]  Relation: Child    Past Surgical History:  Past Surgical History:   Procedure Laterality Date    ABDOMEN SURGERY      benign tumor removed near remaining ovary, 1.5 pounds    APPENDECTOMY      appendix ruptured, developed peritonitis    BACK SURGERY      BUNIONECTOMY Left     along with calcium deposits removed    [address] [facility]  2005    negative    CERVICAL FUSION  2021    POSTERIOR C3-6 LAMINECTOMY, PARTIAL C7 LAMINECTOMY, FUSION C3-C6, SILVERCORD    CERVICAL FUSION N/A 2021    POSTERIOR C3-6 LAMINECTOMY, PARTIAL C7 LAMINECTOMY, FUSION C3-C6, SILVERCORD performed by Bernard Conley DO at [address]    12 INCHES REMOVED D/T OBSTRUCTION    COLONOSCOPY      CYST REMOVAL Right     right facial    HYSTERECTOMY      taken as a result of recurring cysts    LUMBAR FUSION N/A 02/10/2020    LUMBAR L4-5 POSTERIOR  DECOMPRESSION INSTRUMENTATION FUSION WCEMENT AUGMENTATION/ performed by Estefani Davenport MD at [facility] N/A 2020    L5-S1 PLIF L4-L5 REVISION performed by Estefani Davenport MD at [address] [place] [address] 08/14/2014    FESS    OVARY REMOVAL  1970    UNILATERAL due to cyst    OVARY REMOVAL  1971    partial, due to cyst    SINUS SURGERY  2004    UPPER GASTROINTESTINAL ENDOSCOPY N/A 05/31/2019    EGD ESOPHAGOGASTRODUODENOSCOPY performed by Jessica Vilchis MD at [address] [place] [address] N/A 08/05/2019    EGD BIOPSY performed by Paras Pierre MD at [address] [place] [address] N/A 08/23/2019    EGD BIOPSY performed by Jessica Vilchis MD at [address] Left 03/05/2019    WRIST OPEN REDUCTION INTERNAL FIXATION performed by Christo Vital MD at [facility] OR       Immunization History:   Immunization History   Administered Date(s) Administered    COVID-19, Pfizer Purple top, DILUTE for use, 12+ yrs, 30mcg/0.3mL dose 03/18/2021, 04/08/2021    Influenza, High Dose (Fluzone 65 yrs and older) 11/17/2017    PPD Test 03/25/2020, 04/04/2020    Pneumococcal Conjugate 13-valent ([id number]) 05/23/2017    Pneumococcal Polysaccharide ([id number]) 05/10/2016       Active Problems:  Patient Active Problem List   Diagnosis Code    Other specified disorders of rotator cuff syndrome of shoulder and allied disorders M75.100    Diverticulosis of large intestine K57.30    Intestinal or peritoneal adhesions with obstruction (postoperative) (postinfection) (HCC) K56.50    Restless legs syndrome (RLS) G25.81    GERD (gastroesophageal reflux disease) K21.9    Essential hypertension I10    Mixed hyperlipidemia E78.2    Other abnormal glucose R73.09    Atherosclerosis I70.90    Allergic rhinitis J30.9    Vitamin D deficiency E55.9    Depression F32. A    Degenerative joint disease (DJD) of hip M16.9    Peripheral edema R60.9    Injury of foot, left M98.120H    Facial droop R29.810    CVA (cerebral vascular accident) ([facility] [address] I63.9    Bradycardia R00.1    Ataxia R27.0    Aphasia R47.01    TIA (transient ischemic attack) G45.9    Need for prophylactic vaccination and inoculation against cholera alone Z23    Osteopenia M85.80    Lumbago M54.50    Meralgia paresthetica of right side G57.11    Lumbar degenerative disc disease M51.36    Spondylosis of lumbar region without myelopathy or radiculopathy M47.816    Blood poisoning [id number]    Cellulitis of left lower extremity L03.116    Encounter for medication monitoring Z51.81    Deep vein thrombosis (DVT) of right lower extremity (HCC) I82.401    Chronic deep vein thrombosis (DVT) of proximal vein of both lower extremities ([facility]) I82.5Y3    Chronic diastolic heart failure ([facility]) I50.32    Neurogenic claudication due to lumbar spinal stenosis M48.062    Sacroiliitis ([facility]) M46.1    Stage 3 chronic kidney disease ([facility]) N18.30    Type 2 diabetes mellitus with circulatory disorder, without long-term current use of insulin ([facility]) E11.59    Chronic deep vein thrombosis (DVT) of popliteal vein of right lower extremity ([facility]) I82.531    Closed fracture of left wrist S62.102A    B12 deficiency E53.8    Iron deficiency anemia secondary to inadequate dietary iron intake D50.8    Closed head injury S09.90XA    Scalp laceration S01. 01XA    Abnormal finding on imaging R93.89    Other irritable bowel syndrome K58.8    Frequent falls R29.6    Double vision H53.2    Muscle soreness M79.10    Peptic ulcer K27.9    Melena K92.1    PUD (peptic ulcer disease) K27.9    Absolute anemia D64.9    Acute blood loss anemia D62    Acute renal failure (HCC) N17.9    Clostridium difficile infection A49.8    Acquired spondylolisthesis M43.10    Spinal stenosis of lumbar region with neurogenic claudication M48.062    Acute deep vein thrombosis (DVT) of proximal vein of left lower extremity ([facility]) I82.4Y2    Leg swelling M79.89    Back pain M54.9    Neurological disorder G98.8    Closed unstable burst fracture of fifth lumbar vertebra with routine healing S32.052D    Slow transit constipation K59.01    Major depressive disorder, recurrent, moderate (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, G99.2    Acute deep vein thrombosis (DVT) of right femoral vein ([facility]) I82.411    S/P cervical spinal fusion Z98.1    Gait instability R26.81    Cervical myelopathy ([facility]) G95.9    Cellulitis of both lower extremities L03.115, L03.116       Isolation/Infection:   Isolation            No Isolation          Patient Infection Status       Infection Onset Added Last Indicated Last Indicated By Review Planned Expiration Resolved Resolved By    None active    Resolved    COVID-19 (Rule Out) 05/17/21 05/17/21 05/17/21 COVID-19 (Ordered)   05/18/21 Rule-Out Test Resulted    COVID-19 (Rule Out) 04/12/21 04/13/21 04/12/21 COVID-19 (Ordered)   04/14/21 Rule-Out Test Resulted    COVID-19 04/25/20 04/25/20 04/25/20 COVID-19   06/18/20 Maira Mejia RN    COVID-19 (Rule Out) 04/25/20 04/25/20 04/25/20 COVID-19 (Ordered)   04/25/20 Rule-Out Test Resulted            Nurse Assessment:  Last Vital Signs: BP (!) 121/59   Pulse 66   Temp 97.7 °F (36.5 °C)   Resp 19   Ht 5' 3\" (1.6 m)   Wt 180 lb (81.6 kg)   SpO2 96%   BMI 31.89 kg/m²     Last documented pain score (0-10 scale): Pain Level: 7  Last Weight:   Wt Readings from Last 1 Encounters:   06/05/22 180 lb (81.6 kg)     Mental Status:  oriented, alert, thought processes intact, and able to concentrate and follow conversation    IV Access:  - None    Nursing Mobility/ADLs:  Walking   Assisted  Transfer  Assisted  Bathing  Independent  Dressing  Assisted  Toileting  Assisted  Feeding  Independent  Med [address]  Independent  Med Delivery   whole    Wound Care Documentation and Therapy:  Incision 05/21/21 Neck Posterior (Active)   Number of days: 382        Elimination:  Continence:    Bowel: Yes  Bladder: Yes  Urinary Catheter: None   Colostomy/Ileostomy/Ileal Conduit: No       Date of Last BM: 06/08/2022    Intake/Output Summary (Last 24 hours) at 6/7/2022 1008  Last data filed at 6/7/2022 0924  Gross per 24 hour   Intake 240 ml   Output --   Net 240 ml     No intake/output data recorded. Safety Concerns:     History of Falls (last 30 days) and At Risk for Falls    Impairments/Disabilities:      None    Nutrition Therapy:  Current Nutrition Therapy:   - Oral Diet:  General and Low Sodium (2gm)    Routes of Feeding: Oral  Liquids: Thin Liquids  Daily Fluid Restriction: no  Last Modified Barium Swallow with Video (Video Swallowing Test): not done    Treatments at the Time of Hospital Discharge:   Respiratory Treatments: none  Oxygen Therapy:  is not on home oxygen therapy. Ventilator:    - No ventilator support    Rehab Therapies: Physical Therapy and Occupational Therapy  Weight Bearing Status/Restrictions: No weight bearing restrictions  Other Medical Equipment (for information only, NOT a DME order):  walker  Other Treatments: Skilled Nursing assessment and monitoring. Medication education and monitoring per protocol. Please refer patient to [facility], [address] [place] [address],  when discharged from your facility for home health services.      Patient's personal belongings (please select all that are sent with patient):  Glasses patient's clothing    RN SIGNATURE:  Electronically signed by Scheryl Duane, RN on 6/9/22 at 9:46 AM EDT    CASE MANAGEMENT/SOCIAL WORK SECTION    Inpatient Status Date: 6/5/2022    Readmission Risk Assessment Score:  Readmission Risk              Risk of Unplanned Readmission:  22           Discharging to Facility/ [facility] [address], [facility].  [phone]  [phone] [address]  [address] [facility] [address]  P: [phone]   F: [phone]       Dialysis Facility (if applicable)   Name:  Address:  Dialysis Schedule:  Phone:  Fax:    / signature: Electronically signed by Rio Mireles RN on 6/7/22 at 10:08 AM EDT    PHYSICIAN SECTION    Prognosis: Good    Condition at Discharge: Stable    Rehab Potential (if transferring to Rehab): Good    Recommended Labs or Other Treatments After Discharge: N/A    Physician Certification: I certify the above information and transfer of Jamal Núñez  is necessary for the continuing treatment of the diagnosis listed and that she requires [facility] for less 30 days.      Update Admission H&P: No change in H&P    PHYSICIAN SIGNATURE:  Electronically signed by Tone Bowens MD on 6/7/22 at 12:40 PM EDT

## 2022-06-07 NOTE — PROGRESS NOTES
[place] [address] Internal Medicine    Progress Note     6/7/2022    12:04 PM    Name:   Koffi Dunn  MRN:     [id number]     Acct:      [de-identified]   Room:   2064/2064-01  IP Day:  2  Admit Date:  6/5/2022  1:47 PM    PCP:   Giovanny Asif MD  Code Status:  Full Code    Subjective:     C/C:   Chief Complaint   Patient presents with    Leg Swelling     Principal Problem:    Cellulitis of both lower extremities  Active Problems:    Stage 3 chronic kidney disease ([facility]    Type 2 diabetes mellitus with circulatory disorder, without long-term current use of insulin ([facility]  Resolved Problems:    * No resolved hospital problems. *    No acute episodes overnight. Patient is heme stable with Tmax of 97.9  Area of \"cellulitis\" improved. Remains on Vancomycin with pharmacy dosing. Creatinine from this am appropriate (patient has history of CKD)   AM CBC reviewed. No leukocytosis. Hb stable. No right calf tenderness on physical exam. Patient is compliant with her Eliquis. Patient does use walker at home. Is working with PT/OT today. Walked around the hallway with use of walker. Counseled on use of blood thinner and risk of fall. Originally refused SNF placement but did reconsider. Education on importance waiting a few seconds when transitioning from laying to standing position. Medications reviewed.       Significant last 24 hr data reviewed ;   Vitals:    06/06/22 0631 06/06/22 1311 06/06/22 1846 06/07/22 0631   BP: (!) 132/52 129/64 (!) 136/58 (!) 121/59   Pulse: 65 71 72 66   Resp: 18 16 20 19   Temp: 98.2 °F (36.8 °C) 98.3 °F (36.8 °C) 97.9 °F (36.6 °C) 97.7 °F (36.5 °C)   TempSrc:       SpO2: 94% 97% 97% 96%   Weight:       Height:          Recent Results (from the past 24 hour(s))   CBC    Collection Time: 06/07/22  6:15 AM   Result Value Ref Range    WBC 5.6 3.5 - 11.0 k/uL    RBC 3.71 (L) 4.0 - 5.2 m/uL    Hemoglobin 11.7 (L) 12.0 - 16.0 g/dL    Hematocrit 35.4 (L) 36 - 46 %    MCV 95.4 80 - 100 fL    MCH 31.5 26 - 34 pg    MCHC 33.0 31 - 37 g/dL    RDW 13.6 11.5 - 14.9 %    Platelets 819 866 - 968 k/uL    MPV 6.8 6.0 - 12.0 fL   Vancomycin Level, Random    Collection Time: 06/07/22  6:15 AM   Result Value Ref Range    Vancomycin Rm 22.2 ug/mL    Vancomycin Random Dose amount 1,250     Vancomycin Random Date last dose 60,622     Vancomycin Random Time last dose 2,308    Creatinine    Collection Time: 06/07/22  6:15 AM   Result Value Ref Range    CREATININE 0.86 0.50 - 0.90 mg/dL    GFR Non-African American >60 >60 mL/min    GFR African American >60 >60 mL/min    GFR Comment           Recent Labs     06/06/22  5645   POCGLU 113*      CT Head WO Contrast    Result Date: 6/5/2022  EXAMINATION: CT OF THE HEAD WITHOUT CONTRAST  6/5/2022 2:31 pm TECHNIQUE: CT of the head was performed without the administration of intravenous contrast. Automated exposure control, iterative reconstruction, and/or weight based adjustment of the mA/kV was utilized to reduce the radiation dose to as low as reasonably achievable. COMPARISON: None. HISTORY: ORDERING SYSTEM PROVIDED HISTORY: fall, head injury TECHNOLOGIST PROVIDED HISTORY: fall, head injury Decision Support Exception - unselect if not a suspected or confirmed emergency medical condition->Emergency Medical Condition (MA) Reason for Exam: fall, head injury Additional signs and symptoms: Pt states fall with head injury a few days ago FINDINGS: BRAIN/VENTRICLES: There is no acute intracranial hemorrhage, mass effect or midline shift. No abnormal extra-axial fluid collection. The gray-white differentiation is maintained without evidence of an acute infarct. There is no evidence of hydrocephalus. ORBITS: The visualized portion of the orbits demonstrate no acute abnormality. SINUSES: The visualized paranasal sinuses and mastoid air cells demonstrate no acute abnormality. 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 SOFT TISSUES/SKULL:  No acute abnormality of the visualized skull or soft tissues. No acute intracranial abnormality. CT Cervical Spine WO Contrast    Result Date: 6/5/2022  EXAMINATION: CT OF THE CERVICAL SPINE WITHOUT CONTRAST 6/5/2022 11:33 am TECHNIQUE: CT of the cervical spine was performed without the administration of intravenous contrast. Multiplanar reformatted images are provided for review. Automated exposure control, iterative reconstruction, and/or weight based adjustment of the mA/kV was utilized to reduce the radiation dose to as low as reasonably achievable. COMPARISON: 03/16/2022 HISTORY: ORDERING SYSTEM PROVIDED HISTORY: fall, neck pain TECHNOLOGIST PROVIDED HISTORY: fall, neck pain Decision Support Exception - unselect if not a suspected or confirmed emergency medical condition->Emergency Medical Condition (MA) Reason for Exam: fall, head injury Additional signs and symptoms: Pt states fall a few days ago and complains of neck pain FINDINGS: BONES/ALIGNMENT: There is stable R1-L5 posterior metallic fusion with associated laminectomies at this level. No evidence of instrumentation loosening or failure. Stable reversal of the normal cervical lordosis. No fracture or osseous destructive lesion. DEGENERATIVE CHANGES: Multilevel degenerative disc disease is noted in the cervical spine which is mild in severity. This is greatest at C6-C7. SOFT TISSUES: Vascular calcifications are noted along the aorta. The lung apices are clear. The thyroid gland is heterogeneous. There is a right-sided thyroid nodule that is low in attenuation measuring 4 mm, benign. No follow-up imaging is required. Vascular calcifications are noted in the carotid bulbs. No acute osseous abnormality of the cervical spine.   No significant change from prior exam     XR CHEST PORTABLE    Result Date: 6/5/2022  EXAMINATION: ONE XRAY VIEW OF THE CHEST 6/5/2022 2:15 pm COMPARISON: April 8, 2022 HISTORY: [address] PROVIDED HISTORY: shortness of breath TECHNOLOGIST PROVIDED HISTORY: shortness of breath Reason for Exam: Shortness of breath FINDINGS: The lungs are without acute focal process. There is no effusion or pneumothorax. The cardiomediastinal silhouette is without acute process. The osseous structures are without acute process. No acute process. VL Lower Extremity Bilateral Venous Duplex    Result Date: 6/5/2022    [address] [place] [address]  Vascular Lower Extremities DVT Study Procedure   Patient Name   Andi Douglas       Date of Study           06/05/2022                 Kain Madera   Date of Birth  1950  Gender                  Female   Age            71 year(s)  Race                       Room Number    SANDIP   Corporate ID # [id number]   Patient Acct # [de-identified]   MR #           [id number]      Sonographer             Jordan Carnes   Accession #    [id number]  Interpreting Physician  Etelvina Singh   Referring                  Referring Physician     Marla Sánchez  Nurse  Practitioner  Procedure Type of Study:   Veins: Lower Extremities DVT Study, Venous Scan Lower Bilateral.  Indications for Study:Leg Swelling. Patient Status:ER. Technical Quality:Limited visualization. Limitation reason:swelling. Comments: Indications: leg swelling, wells score of 4. Patient indicates she is on Eliquis twice a day and has a history of cellulitis. Conclusions   Summary   No evidence of superficial or deep venous thrombosis in both lower  extremities.    Signature   ----------------------------------------------------------------  Electronically signed by Jordan Carnes(Sonographer) on  06/05/2022 08:33 PM  ----------------------------------------------------------------   ----------------------------------------------------------------  Electronically signed by Florance Seip Reyes,Arthur(Interpreting  physician) on 06/05/2022 09:59 PM  ----------------------------------------------------------------  Findings:   Right Impression:                    Left Impression:   The common femoral, proximal         The common femoral, proximal femoral,  femoral, and popliteal veins         and popliteal veins demonstrate  demonstrate normal compressibility. normal compressibility. Normal compressibility of the great  Normal compressibility of the great  saphenous vein. saphenous vein. Normal compressibility of the small  Normal compressibility of the small  saphenous vein. saphenous vein. Velocities are measured in cm/s ; Diameters are measured in cm Right Lower Extremities DVT Study Measurements Right 2D Measurements +------------------------------------+----------+---------------+----------+ ! Location                            ! Visualized! Compressibility! Thrombosis! +------------------------------------+----------+---------------+----------+ ! Common Femoral                      !Yes       ! Yes            ! None      ! +------------------------------------+----------+---------------+----------+ ! Prox Femoral                        !Yes       ! Yes            ! None      ! +------------------------------------+----------+---------------+----------+ ! Mid Femoral                         !No        !               !          ! +------------------------------------+----------+---------------+----------+ ! Dist Femoral                        !No        !               !          ! +------------------------------------+----------+---------------+----------+ ! Deep Femoral                        !No        !               !          ! +------------------------------------+----------+---------------+----------+ ! Popliteal                           !Yes       ! Yes            ! None      ! +------------------------------------+----------+---------------+----------+ ! Sapheno Femoral Junction            ! Yes       ! Yes            ! None      ! +------------------------------------+----------+---------------+----------+ ! PTV                                 ! Partial   !Yes            ! None      ! +------------------------------------+----------+---------------+----------+ ! Peroneal                            !No        !               !          ! +------------------------------------+----------+---------------+----------+ ! Gastroc                             ! Partial   !Yes            ! None      ! +------------------------------------+----------+---------------+----------+ ! GSV Thigh                           ! Yes       ! Yes            ! None      ! +------------------------------------+----------+---------------+----------+ ! GSV Knee                            ! Yes       ! Yes            ! None      ! +------------------------------------+----------+---------------+----------+ ! GSV Ankle                           ! Yes       ! Yes            ! None      ! +------------------------------------+----------+---------------+----------+ ! SSV                                 ! Yes       ! Yes            ! None      ! +------------------------------------+----------+---------------+----------+ Right Doppler Measurements +---------------------------+------+------+--------------------------------+ ! Location                   ! Signal!Reflux! Reflux (msec)                   ! +---------------------------+------+------+--------------------------------+ ! Common Femoral             !Phasic!      !                                ! +---------------------------+------+------+--------------------------------+ ! Prox Femoral               !Phasic!      !                                ! +---------------------------+------+------+--------------------------------+ ! Popliteal                  !Phasic!      !                                ! +---------------------------+------+------+--------------------------------+ Left Lower Extremities DVT Study Measurements Left 2D Measurements +------------------------------------+----------+---------------+----------+ ! Location                            ! Visualized! Compressibility! Thrombosis! +------------------------------------+----------+---------------+----------+ ! Common Femoral                      !Yes       ! Yes            ! None      ! +------------------------------------+----------+---------------+----------+ ! Prox Femoral                        !Yes       ! Yes            ! None      ! +------------------------------------+----------+---------------+----------+ ! Mid Femoral                         !No        !               !          ! +------------------------------------+----------+---------------+----------+ ! Dist Femoral                        !No        !               !          ! +------------------------------------+----------+---------------+----------+ ! Deep Femoral                        !No        !               !          ! +------------------------------------+----------+---------------+----------+ ! Popliteal                           !Yes       ! Yes            ! None      ! +------------------------------------+----------+---------------+----------+ ! Sapheno Femoral Junction            ! Yes       ! Yes            ! None      ! +------------------------------------+----------+---------------+----------+ ! PTV                                 ! Partial   !Yes            ! None      ! +------------------------------------+----------+---------------+----------+ ! Peroneal                            !No        !               !          ! +------------------------------------+----------+---------------+----------+ ! Gastroc                             ! Partial   !Yes            ! None      ! +------------------------------------+----------+---------------+----------+ ! GSV Thigh                           ! Yes       ! Yes            ! None      ! +------------------------------------+----------+---------------+----------+ ! GSV Knee !Yes       !Yes            ! None      ! +------------------------------------+----------+---------------+----------+ ! GSV Ankle                           ! Yes       ! Yes            ! None      ! +------------------------------------+----------+---------------+----------+ ! SSV                                 ! Yes       ! Yes            ! None      ! +------------------------------------+----------+---------------+----------+ Left Doppler Measurements +---------------------------+------+------+--------------------------------+ ! Location                   ! Signal!Reflux! Reflux (msec)                   ! +---------------------------+------+------+--------------------------------+ ! Common Femoral             !Phasic! No    !                                ! +---------------------------+------+------+--------------------------------+ ! Prox Femoral               !Phasic!      !                                ! +---------------------------+------+------+--------------------------------+ ! Popliteal                  !Phasic!      !                                ! +---------------------------+------+------+--------------------------------+          On admission     The patient is a 71 y.o.  female, with a history of anemia, spondylolisthesis, DVT, chronic diastolic heart failure, CVA, C. difficile, HTN, CKD stage III, peptic ulcer disease, and DM type II, who presents with leg swelling. According to patient and her , legs have been increasingly edematous with progressive erythema over the past 2 weeks. Patient was admitted to this facility in March for frequent falls and found to have DVT. Patient reports that she was concerned for recurrent DVT, despite being compliant with Eliquis. Following inpatient treatment, patient received intensive therapy and acute rehab department but reports having 5 falls since that time. Patient has a history of left sided weakness from old CVA.     thinks falls are due to poor technique with transfers d/t fear of falling, plus edema makes it difficult to lift legs to walk. Symptoms are associated with painful hematoma on posterior scalp from recent fall. Patient also reports dry non-productive cough, which is residual from recent bronchitis. Denies fever, chills, chest pain, abdominal pain, nausea, vomiting, diarrhea, and urinary symptoms. There are no aggravating or alleviating factors. Symptoms are reported as constant and moderate to severe; progressively worsening. Review of Systems:     Constitutional: Negative for chills, diaphoresis and fever. HENT: Negative for congestion and sore throat. Respiratory: Positive for cough (dry, nonproductive). Negative for shortness of breath and wheezing. Cardiovascular: Positive for leg swelling (reports weeping edema). Negative for chest pain and palpitations. Gastrointestinal: Negative for abdominal pain, constipation, diarrhea, nausea and vomiting. Genitourinary: Negative for dysuria, frequency and urgency. Musculoskeletal: Negative for back pain and myalgias. Skin: Positive for color change (erythema, BLE). Negative for rash. Neurological: Positive for weakness (generalized weakness) and headaches (pain on posterior scalp from recent fall. ). Negative for dizziness. Psychiatric/Behavioral: The patient is not nervous/anxious. Data:     Past Medical History: No change     Social History: No change    Family History: @no change    Vitals:  Reviewed    I/O (24Hr): Intake/Output Summary (Last 24 hours) at 6/7/2022 1204  Last data filed at 6/7/2022 0924  Gross per 24 hour   Intake 240 ml   Output --   Net 240 ml     Labs:  CBC from today. Creatine from today.      Radiology:  N/A    Medications:     Current Meds:   Scheduled Meds:    vancomycin  1,500 mg IntraVENous Q24H    amLODIPine  5 mg Oral Daily    apixaban  5 mg Oral BID    atorvastatin  40 mg Oral Nightly    busPIRone  5 mg Oral TID    calcium carb-cholecalciferol  1 tablet Oral Nightly    carvedilol  12.5 mg Oral BID    citalopram  20 mg Oral Daily    cyanocobalamin  1,000 mcg Oral Nightly    fenofibrate  160 mg Oral Daily    ferrous sulfate  325 mg Oral BID    gabapentin  200 mg Oral BID    pramipexole  0.5 mg Oral Nightly    furosemide  20 mg IntraVENous Daily    sodium chloride flush  5-40 mL IntraVENous 2 times per day    vancomycin (VANCOCIN) intermittent dosing (placeholder)   Other RX Placeholder     Continuous Infusions:    sodium chloride 20 mL/hr at 22 2322     PRN Meds: melatonin, sodium chloride flush, sodium chloride, potassium chloride **OR** potassium alternative oral replacement **OR** potassium chloride, magnesium sulfate, ondansetron **OR** ondansetron, polyethylene glycol, acetaminophen **OR** acetaminophen    Physical Examination:        BP (!) 121/59   Pulse 66   Temp 97.7 °F (36.5 °C)   Resp 19   Ht 5' 3\" (1.6 m)   Wt 180 lb (81.6 kg)   SpO2 96%   BMI 31.89 kg/m²   Temp (24hrs), Av °F (36.7 °C), Min:97.7 °F (36.5 °C), Max:98.3 °F (36.8 °C)    Recent Labs     22  0632   POCGLU 113*       Intake/Output Summary (Last 24 hours) at 2022 1204  Last data filed at 2022 1240  Gross per 24 hour   Intake 240 ml   Output --   Net 240 ml     Constitutional:       General: She is not in acute distress. Appearance: She is well-developed. She is not diaphoretic. HENT:      Head: Normocephalic and atraumatic. Eyes:      Conjunctiva/sclera: Conjunctivae normal.      Pupils: Pupils are equal, round, and reactive to light. Neck:      Trachea: No tracheal deviation. Cardiovascular:      Rate and Rhythm: Normal rate and regular rhythm. Heart sounds: Normal heart sounds. No murmur heard. No friction rub. No gallop. Pulmonary:      Effort: Pulmonary effort is normal. No respiratory distress. Breath sounds: Normal breath sounds. No wheezing or rales.    Chest:      Chest wall: No tenderness. Abdominal:      General: Bowel sounds are normal. There is no distension. Palpations: Abdomen is soft. Tenderness: There is no abdominal tenderness. There is no guarding. Musculoskeletal:         General: No tenderness. Normal range of motion. Cervical back: Normal range of motion and neck supple. Right lower leg: Edema (1+) present. Left lower leg: Edema (1+) present. Comments: Patient with 1+ pitting edema bilateral lower extremities that extends up your groin. Bilateral lower legs are mildly erythematous with increased warmth. There are multiple small scabbed areas on bilateral shins. Lymphadenopathy:      Cervical: No cervical adenopathy. Skin:     General: Skin is warm and dry. Coloration: Skin is not pale. Findings: Erythema (BLE) present. No rash. Neurological:      Mental Status: She is alert and oriented to person, place, and time. Motor: No seizure activity. Coordination: Coordination normal.   Psychiatric:         Behavior: Behavior normal.         Thought Content: Thought content normal.     No lower extremity calf tenderness bilaterally   Redness/Induration of anterior legs bilaterally reduced     Assessment:        Primary Problem  Cellulitis of both lower extremities    Principal Problem:    Cellulitis of both lower extremities  Active Problems:    Stage 3 chronic kidney disease (HCC)    Type 2 diabetes mellitus with circulatory disorder, without long-term current use of insulin ([facility])  Resolved Problems:    * No resolved hospital problems. *     Plan:        6/7/22    Transitioned from IV Vancomycin to Oral Doxycycline for 5 days. Educated on side effects of medication. Transitioned from IV Lasix to Home Oral Lasix   Patient was originally not receptive to SNF placement. After discussion and emphasis on the risks/rewards patient will consider placement after discussion with .   efforts appreciated. Hospital Problems           Last Modified POA    * (Principal) Cellulitis of both lower extremities 6/5/2022 Yes    Stage 3 chronic kidney disease ([address] 6/6/2022 Yes    Type 2 diabetes mellitus with circulatory disorder, without long-term current use of insulin ([address] 6/6/2022 Yes                 Disposition: Possible discharge home today if patient is not receptive to SNF. Patient lives with spouse and has walker and home care already. If agreeable to SNF pre-cert to be initiated today. Jose Rangel MD  PGY-2, Internal Medicine Resident  [facility]  6/7/2022 12:39 PM    Attestation and add on       I have discussed the care of Early Reeve , including pertinent history and exam findings,      6/7/22    with the resident. I have seen and examined the patient and the key elements of all parts of the encounter have been performed by me . I agree with the assessment, plan and orders as documented by the resident. Hospital Problems           Last Modified POA    * (Principal) Cellulitis of both lower extremities 6/5/2022 Yes    Stage 3 chronic kidney disease ([address] 6/6/2022 Yes    Type 2 diabetes mellitus with circulatory disorder, without long-term current use of insulin ([address] 6/6/2022 Yes             ''''''''''       MD GENA Corral [address], [address] [place] [address].    Phone [phone]   Fax: [phone]  Answering Service: [phone]

## 2022-06-07 NOTE — CARE COORDINATION
SW met with patient to discuss SNF options. Patient stated that she would like an inpatient rehab. Patient stated that she was denied for our ARU at [facility], but she would like to go to [facility]. SW explained to her that if she does not qualify for our ARU, it it possible that she might not qualify for other inpatient rehabs depending on the criteria. Patient was understanding, and stated that she has already contacted [facility] herself, and is waiting for a call from Kayleen Mcconnell. SW did obtain 2 SNF choices as a back up. [facility] [place], and [address] [place] [address] of  [address]. [address] [place] [address] is [facility], but SW will send a referral to [facility]. SW also sent a referral to [facility] so they have patients information. LSW will continue to follow.      Electronically signed by Conchis Garcia on 6/7/22 at 3:41 PM EDT

## 2022-06-07 NOTE — PROGRESS NOTES
Occupational Therapy  Facility/Department: [facility]  Occupational Therapy Initial Assessment    Name: Gonsalo Flores  : 1950  MRN: [id number]  Date of Service: 2022    Discharge Recommendations:  Patient would benefit from continued therapy after discharge  OT Equipment Recommendations  Other: TBD       Patient Diagnosis(es): The encounter diagnosis was Leg swelling. Past Medical History:  has a past medical history of Allergic rhinitis, cause unspecified, Back pain, Bowel obstruction (HCC), C. difficile diarrhea, CAD (coronary artery disease), Cardiac murmur, Cellulitis, Cellulitis, Cerebral artery occlusion with cerebral infarction ([address] COVID-19, Diverticulosis of colon (without mention of hemorrhage), GERD (gastroesophageal reflux disease), GERD (gastroesophageal reflux disease), History of blood transfusion, History of CHF (congestive heart failure), History of MI (myocardial infarction), History of ovarian cyst, History of peritonitis, HTN (hypertension), Hx of blood clots, Hyperlipidemia, Intestinal or peritoneal adhesions with obstruction (postoperative) (postinfection) ([address] Kidney infection, Lateral epicondylitis  of elbow, MDRO (multiple drug resistant organisms) resistance, Muscle strain, Other abnormal glucose, PONV (postoperative nausea and vomiting), Pre-diabetes, Restless legs syndrome (RLS), Snores, 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[address] TIA (transient ischemic attack), Uses walker, Vitamin D deficiency, Wears glasses, and Wellness examination. Past Surgical History:  has a past surgical history that includes Ovary removal (); colectomy (); Appendectomy (); Ovary removal (); Hysterectomy (); Bunionectomy (Left); sinus surgery (); Colonoscopy; other surgical history (2014); cyst removal (Right); Wrist fracture surgery (Left, 2019); Upper gastrointestinal endoscopy (N/A, 2019);  Upper gastrointestinal endoscopy (N/A, 2019); Upper gastrointestinal endoscopy (N/A, 08/23/2019); Abdomen surgery (1976); lumbar fusion (N/A, 02/10/2020); Cardiac catheterization; Cardiac catheterization (2005); [place] tooth extraction; lumbar fusion (N/A, 06/17/2020); back surgery; cervical fusion (05/21/2021); and cervical fusion (N/A, 5/21/2021). Treatment Diagnosis: impaired self care status      Assessment   Performance deficits / Impairments: Decreased ADL status; Decreased functional mobility ; Decreased strength;Decreased safe awareness;Decreased endurance;Decreased balance;Decreased high-level IADLs;Decreased fine motor control;Decreased coordination  Treatment Diagnosis: impaired self care status  Prognosis: Good  Decision Making: Medium Complexity  REQUIRES OT FOLLOW-UP: Yes  Activity Tolerance  Activity Tolerance: Patient Tolerated treatment well        Plan   Plan  Times per Week: 4-6  Current Treatment Recommendations: Self-Care / ADL,Strengthening,Balance training,Functional mobility training,Endurance training,Safety education & training,Patient/Caregiver education & training,Equipment evaluation, education, & procurement     Restrictions  Restrictions/Precautions  Restrictions/Precautions: Fall Risk,General Precautions  Required Braces or Orthoses?: No  Implants present? : Metal implants (stu in back, stu in wrist , neck has screws)  Position Activity Restriction  Other position/activity restrictions: up with assist    Subjective   General  Chart Reviewed: Yes  Patient assessed for rehabilitation services?: Yes  Family / Caregiver Present: No  Referring Practitioner: MAIK Jones CNP  Diagnosis: Leg swelling  Subjective  Subjective: \"I have fallen 5 times in the last week. \" pt was pleasant and agreeable to OT eval  General Comment  Comments: Ok per [facility] for OT eval     Social/Functional History  Social/Functional History  Lives With: Spouse  Type of Home: House  Home Layout: One level  Home Access: Stairs to enter without rails  Entrance Stairs - Number of Steps: 1  Entrance Stairs - Rails: None  Bathroom Shower/Tub: Tub/Shower unit,Shower chair with back,Curtain  Bathroom Toilet: Handicap height  Bathroom Equipment: Grab bars around toilet,Grab bars in shower,Shower chair,Hand-held shower  Bathroom Accessibility: Accessible  Home Equipment: Walker, rolling,Rollator,Cane,Cane, quad,Reacher,Alert Button,Lift chair,Hospital bed  Has the patient had two or more falls in the past year or any fall with injury in the past year?: Yes  ADL Assistance: Independent (Supervision for shower)  Bath:  (dter comes to help bathe)  Homemaking Assistance: Needs assistance (Cleaning lady,  does groceries, pt A as able)  Homemaking Responsibilities: Yes (SO does grocery shopping, clening lady comes every other week)  Ambulation Assistance: Independent (with RW)  Transfer Assistance: Independent  Active : No  Patient's  Info: spouse  Mode of Transportation: SUV,Truck  Occupation: Retired  Type of Occupation:   IADL Comments: Pt report sleeping in hospital bed or lift chair at home  Additional Comments: daughter lives near by (does laundry, get pills ready, and helps shower) comes Monday and Thursday. 7 grand children       Objective   Heart Rate: 66  BP: (!) 114/49  BP Location: Right upper arm  Patient Position: Supine  MAP (Calculated): 70.67  Resp: 20  SpO2: 90 %  Vision Exceptions: Wears glasses for reading  Hearing: Within functional limits          Safety Devices  Type of Devices: All fall risk precautions in place;Call light within reach; Bed alarm in place;Gait belt;Patient at risk for falls; Left in bed;Nurse notified  Bed Mobility Training  Bed Mobility Training: Yes  Rolling: Stand-by assistance  Supine to Sit: Stand-by assistance  Sit to Supine: Stand-by assistance  Scooting: Minimum assistance (boost to [facility], B LE anchored )  Balance  Sitting: Without support  Standing: With support  Transfer Training  Transfer Training: Yes  Interventions: Verbal cues; Safety awareness training (Cues for safe RW alignment and handplacement )  Sit to Stand: Contact-guard assistance  Stand to Sit: Contact-guard assistance  Stand Pivot Transfers: Contact-guard assistance  Toilet Transfer: Minimum assistance (for controlled descent. )  Gait Training  Gait Training: Yes  Gait  Overall Level of Assistance: Contact-guard assistance  Interventions: Verbal cues; Safety awareness training; Tactile cues  Speed/Alicia: Pace decreased (< 100 feet/min); Shuffled; Slow  Step Length: Left lengthened;Right shortened  Stance: Left decreased  Distance (ft): 60 Feet  Assistive Device: Walker, rolling  Toilet Transfers  Toilet - Technique: Ambulating  Equipment Used: Standard toilet  Toilet Transfer: Minimal assistance  Toilet Transfers Comments: Verbal cues for hand placement and safety     ADL  Feeding: Setup  Grooming: Setup  UE Bathing: Stand by assistance  LE Bathing: Moderate assistance  UE Dressing: Stand by assistance  LE Dressing: Moderate assistance  Toileting: Minimal assistance  Toileting Skilled Clinical Factors: Min A while standing for clothing management and hygiene  Additional Comments: ADL scores based on clinical reasoning and skilled observation unless otherwise noted. Pt currently limited due to decreased strength, balance, activity tolerance impacting safety and independence with self care tasks  Tone RUE  RUE Tone: Normotonic  Tone LUE  LUE Tone: Normotonic  Coordination  Movements Are Fluid And Coordinated: No  Coordination and Movement Description: Fine motor impairments;Gross motor impairments;Decreased speed;Decreased accuracy; Right UE;Left UE  Activity Tolerance  Activity Tolerance: Patient limited by fatigue;Patient limited by endurance  Bed mobility  Rolling to Right: Stand by assistance  Supine to Sit: Stand by assistance  Sit to Supine: Contact guard assistance  Bed Mobility Comments: Bed mobility completed with HOB elevated with increased time and use of bed rails. Transfers  Sit to stand: Minimal assistance  Stand to sit: Minimal assistance  Transfer Comments: Verbal cues for hand placement and safety. min A - CGA with transfers.  A with blocking L foot due to foot slippig during transfers     Cognition  Overall Cognitive Status: WFL        Sensation  Overall Sensation Status: WFL (Pt denies)         Education Given To: Patient  Education Provided: Role of Therapy;Plan of Care;Transfer Training  Education Method: Verbal  Barriers to Learning: None  Education Outcome: Verbalized understanding;Continued education needed  LUE AROM (degrees)  LUE AROM : WFL  Left Hand AROM (degrees)  Left Hand AROM: WFL  RUE AROM (degrees)  RUE AROM : WFL  Right Hand AROM (degrees)  Right Hand AROM: WFL  LUE Strength  Gross LUE Strength: WFL  L Hand General: 4-/5  LUE Strength Comment: Grossly 4-/5  RUE Strength  Gross RUE Strength: WFL  R Hand General: 4/5  RUE Strength Comment: Grossly 4/5                  AM-PAC Score        AM-[facility] Inpatient Daily Activity Raw Score: 16 (06/07/22 1503)  AM-PAC Inpatient ADL T-Scale Score : 35.96 (06/07/22 1503)  ADL Inpatient CMS 0-100% Score: 53.32 (06/07/22 1503)  ADL Inpatient CMS G-Code Modifier : CK (06/07/22 1503)    Goals  Short Term Goals  Time Frame for Short term goals: by discharge  Short Term Goal 1: Pt will complete lower body dressing/bathing with SBA and Good safety with use of AE as needed  Short Term Goal 2: Pt will complete functional transfers/mobility during self care tasks with SBA and Good safety   Short Term Goal 3: Pt will verbalize/demonstrate Good understanding of home safety/fall prevention strategies to increse safety and independence with self care and mobility  Short Term Goal 4: Pt will participate in 15+ minutes of therapeutic exercises/functional activities to increase safety and independence with self care and mobility       Therapy Time   Individual Concurrent Group Co-treatment   Time In [address] [facility]         Minutes 25         Timed Code Treatment Minutes: 10 Minutes       Krysta Henao, OTR/L

## 2022-06-07 NOTE — PROGRESS NOTES
Physical Therapy  Facility/Department: [facility] MED SURG  Daily Treatment Note  NAME: Khadar Marsh  : 1950  MRN: [id number]    Date of Service: 2022    Discharge Recommendations:  Patient would benefit from continued therapy after discharge,Therapy recommended at discharge   PT Equipment Recommendations  Equipment Needed: No (pt reports having rollator)    Patient Diagnosis(es): The encounter diagnosis was Leg swelling. Assessment   Activity Tolerance: Patient limited by fatigue;Patient limited by endurance  Equipment Needed: No (pt reports having rollator)     Plan    Plan  Plan: 5-7 times per week  PT Plan of Care:  (5-7 times per week)     Restrictions  Restrictions/Precautions  Restrictions/Precautions: Fall Risk (peripheral IV R anterior forearm and L antecubital)  Required Braces or Orthoses?: No  Implants present? : Metal implants (stu in back, stu in wrist , neck has screws)  Position Activity Restriction  Other position/activity restrictions: up with assist     Subjective    Subjective  Subjective: Pt positioned in [facility], MEENU perez's tx and pt is agreeable. Orientation  Overall Orientation Status: Within Normal Limits  Cognition  Overall Cognitive Status: WNL     Objective   Vitals     Bed Mobility Training  Bed Mobility Training: Yes  Rolling: Stand-by assistance  Supine to Sit: Stand-by assistance (HOB elevated, heavy use of bed rails)  Sit to Supine: Stand-by assistance  Scooting: Minimum assistance (boost to [facility], B LE anchored )  Balance  Sitting: Without support  Standing: With support  Transfer Training  Transfer Training: Yes  Interventions: Verbal cues; Safety awareness training (Cues for safe RW alignment and handplacement )  Sit to Stand: Contact-guard assistance  Stand to Sit: Contact-guard assistance  Stand Pivot Transfers: Contact-guard assistance  Toilet Transfer: Minimum assistance (for controlled descent. )  Gait Training  Gait Training: Yes  Gait  Overall Level of Assistance: Contact-guard assistance  Interventions: Verbal cues; Safety awareness training; Tactile cues  Speed/Alicia: Pace decreased (< 100 feet/min); Shuffled;Slow; FWD flexed posture; downward gaze. Step Length: Left lengthened;Right shortened (poor carryover of cuing provided to correct)   Stance: Left decreased   Distance (ft): 60 Feet  Assistive Device: Walker, rolling     PT Exercises  A/AROM Exercises: Supine B LE ex's x10-15   Dynamic Sitting Balance Exercises: Reaching OOBOS low/high ~2mins x2              Goals  Short Term Goals  Time Frame for Short term goals: 5-7 days  Short term goal 1: Pt will tolerate one thirty minute therapeutic exercise session to show improvment in activity tolerance. Short term goal 2: Pt will improve one half MMT grade on L LE to show improvement in strength. Short term goal 3: Pt will be able to to half tandem stance with L LE posterior for 1-2 minutes to show ability to WB and maintain balance. Short term goal 4: Pt will be able to ambulate one 8\" step with no HR and least restrictive device and MinAx1 to be able to enter home. Short term goal 5: Pt will be able to ambulate 50-60ft with least restrictive device and CGAx1 to show improvement for distance walked. Additional Goals?: Yes  Short Term Goal 6: Pt will achieve a sit<>stand with CGAx1 with least restrictive device to show improvement in overall transfers. Patient Goals   Patient goals : to return home to spouse    Education  Patient Education  Education Given To: Patient  Education Provided: Energy Conservation;Transfer Training; Fall Prevention Strategies  Education Method: Demonstration;Verbal  Barriers to Learning: None  Education Outcome: Verbalized understanding;Continued education needed    Therapy Time   Individual Concurrent Group Co-treatment   Time In 1130         Time Out 18         Minutes [address], [place]

## 2022-06-07 NOTE — FLOWSHEET NOTE
Patient tearful and anxious; patient does not want to go to rehab but is being encouraged to do so by her  and her medical team;  patient worried about \"going to a nursing home\", and discussed her options of receiving therapy in other ways; patient welcomed prayer; listening presence and support;     06/07/22 1617   Encounter Summary   Encounter Overview/Reason  Spiritual/Emotional Needs;Grief, Loss, and Adjustments   Service Provided For: Patient   Referral/Consult From: [address] [place] [address]; [facility]   Last Encounter  06/07/22   Complexity of Encounter Moderate   Begin Time 1400   End Time  1420   Total Time Calculated 20 min   Spiritual/Emotional needs   Type Spiritual Support   Grief, Loss, and Adjustments   Type Adjustment to illness   Assessment/Intervention/Outcome   Assessment Anxious; Tearful;Coping; Hopeful;Fearful;Powerlessness   Intervention Active listening;Discussed illness injury and its impact; Explored/Affirmed feelings, thoughts, concerns;Prayer (assurance of)/[place];Sustaining Presence/Ministry of presence   Outcome Coping;Engaged in conversation;Expressed feelings, needs, and concerns;Expressed Gratitude;Receptive

## 2022-06-07 NOTE — CARE COORDINATION
ONGOING DISCHARGE PLAN:    Patient is alert and oriented x4. Spoke with patient regarding discharge plan and patient confirms that plan is still home with spouse with [address] PT rec SNF. Pt refuses. Pt states she mght be agreeable to [facility]. Will discuss with PT.    IV lasix 20 mg daily, IV vanco.    Eliquis PTA for DVT. Will continue to follow for additional discharge needs.     Electronically signed by Lupe Otto RN on 6/7/2022 at 10:04 AM

## 2022-06-07 NOTE — PROGRESS NOTES
Vancomycin Dosing by Pharmacy - Daily Note   Vancomycin Therapy Day:  3  Indication: bilateral leg cellulitis     Allergies:  Bactrim [sulfamethoxazole-trimethoprim], Codeine, Diazepam, Meperidine hcl, and Seasonal   Actual Weight:    Wt Readings from Last 1 Encounters:   06/05/22 180 lb (81.6 kg)       Labs/Ancillary Data  Estimated Creatinine Clearance: 61 mL/min (based on SCr of 0.86 mg/dL). Recent Labs     06/05/22  1410 06/05/22  1515 06/06/22  0603 06/07/22  0615   CREATININE  --  0.91* 0.92* 0.86   BUN  --  27* 22  --    WBC 6.6  --  6.4 5.6     No results found for: PROCAL    Intake/Output Summary (Last 24 hours) at 6/7/2022 1004  Last data filed at 6/7/2022 0924  Gross per 24 hour   Intake 240 ml   Output --   Net 240 ml     Temp: 97.7    Culture Date / Source  /  Results  See micro   Recent vancomycin administrations                     vancomycin (VANCOCIN) 1,250 mg in dextrose 5 % 250 mL IVPB (ADDAVIAL) (mg) 1,250 mg New Bag 06/06/22 2308    vancomycin (VANCOCIN) intermittent dosing (placeholder) ()  Given 06/05/22 2340    vancomycin (VANCOCIN) 2,000 mg in dextrose 5 % 500 mL IVPB (mg) 2,000 mg New Bag 06/05/22 2323                    Vancomycin Concentrations:   TROUGH:  No results for input(s): VANCOTROUGH in the last 72 hours. RANDOM:    Recent Labs     06/07/22  0615   VANCORANDOM 22.2       MRSA Nasal Swab: N/A. Non-respiratory infection. Olivia Santizo PLAN     Increase dose to 1500 mg q24h IV  Ensured BUN/sCr ordered at baseline and every 48 hours x at least 3 levels, then at least weekly.   Pharmacy will continue to monitor patient and adjust therapy as indicated      Vancomycin Target Concentration Parameters  Treatment  Population Target AUC/JOJO Target Trough   Invasive MRSA Infection (bacteremia, pneumonia, meningitis, endocarditis, osteomyelitis)  Sepsis (undifferentiated) 400-600 N/A   Infection due to non-MRSA pathogen  Empiric treatment of non-invasive MRSA infection  (SSTI, UTI) <500 10-15 mg/L CrCl < 29 mL/min  Rapidly fluctuating serum creatinine   BIN N/A < 15 mg/L     Renal replacement therapy is dosed by levels, per hospital protocol. Abbreviations  * Pauc: probability that AUC is >400 (efficacy); Pconc: probability that Ctrough is above 20 ?g/mL (toxicity); Tox: Probability of nephrotoxicity, based on Monse et al. Clin Infect Dis 2009. Loading dose: N/A  Regimen: 1500 mg IV every 24 hours. Start time: 10:03 on 06/07/2022  Exposure target: Ctrough10-15 mg/L   AUC24,ss: 472 mg/L.hr  Probability of AUC24 > 400: 74 %  Ctrough,ss: 14.2 mg/L  Probability of Ctrough,ss > 20: 17 %  Probability of nephrotoxicity (Monse VERONICA 2009): 9 %    Thank you for the consult. Pharmacy will continue to follow.     Renu Almaguer, ErickD, BCPS  6/7/2022 10:05 AM

## 2022-06-07 NOTE — PLAN OF CARE
Problem: Discharge Planning  Goal: Discharge to home or other facility with appropriate resources  6/7/2022 0133 by Sonia Majano RN  Outcome: Progressing  Flowsheets (Taken 6/6/2022 1939)  Discharge to home or other facility with appropriate resources:   Identify barriers to discharge with patient and caregiver   Arrange for needed discharge resources and transportation as appropriate   Identify discharge learning needs (meds, wound care, etc)   Refer to discharge planning if patient needs post-hospital services based on physician order or complex needs related to functional status, cognitive ability or social support system     Problem: Skin/Tissue Integrity  Goal: Absence of new skin breakdown  Description: 1. Monitor for areas of redness and/or skin breakdown  2. Assess vascular access sites hourly  3. Every 4-6 hours minimum:  Change oxygen saturation probe site  4. Every 4-6 hours:  If on nasal continuous positive airway pressure, respiratory therapy assess nares and determine need for appliance change or resting period.   6/7/2022 0133 by Sonia Majano RN  Outcome: Progressing     Problem: Safety - Adult  Goal: Free from fall injury  6/7/2022 0133 by Sonia Majano RN  Outcome: Progressing  Flowsheets (Taken 6/7/2022 0132)  Free From Fall Injury:   Instruct family/caregiver on patient safety   Based on caregiver fall risk screen, instruct family/caregiver to ask for assistance with transferring infant if caregiver noted to have fall risk factors     Problem: Chronic Conditions and Co-morbidities  Goal: Patient's chronic conditions and co-morbidity symptoms are monitored and maintained or improved  6/7/2022 0133 by Sonia Majano RN  Outcome: Progressing  Flowsheets (Taken 6/6/2022 1939)  Care Plan - Patient's Chronic Conditions and Co-Morbidity Symptoms are Monitored and Maintained or Improved:   Monitor and assess patient's chronic conditions and comorbid symptoms for stability, deterioration, or improvement   Collaborate with multidisciplinary team to address chronic and comorbid conditions and prevent exacerbation or deterioration   Update acute care plan with appropriate goals if chronic or comorbid symptoms are exacerbated and prevent overall improvement and discharge     Problem: ABCDS Injury Assessment  Goal: Absence of physical injury  Outcome: Progressing

## 2022-06-08 LAB
EKG ATRIAL RATE: 74 BPM
EKG P AXIS: 64 DEGREES
EKG P-R INTERVAL: 166 MS
EKG Q-T INTERVAL: 400 MS
EKG QRS DURATION: 84 MS
EKG QTC CALCULATION (BAZETT): 444 MS
EKG R AXIS: 45 DEGREES
EKG T AXIS: 47 DEGREES
EKG VENTRICULAR RATE: 74 BPM
HCT VFR BLD CALC: 32.1 % (ref 36–46)
HEMOGLOBIN: 11 G/DL (ref 12–16)
MCH RBC QN AUTO: 32.3 PG (ref 26–34)
MCHC RBC AUTO-ENTMCNC: 34.1 G/DL (ref 31–37)
MCV RBC AUTO: 94.8 FL (ref 80–100)
PDW BLD-RTO: 13.8 % (ref 11.5–14.9)
PLATELET # BLD: 327 K/UL (ref 150–450)
PMV BLD AUTO: 6.6 FL (ref 6–12)
RBC # BLD: 3.39 M/UL (ref 4–5.2)
WBC # BLD: 5.2 K/UL (ref 3.5–11)

## 2022-06-08 PROCEDURE — 97116 GAIT TRAINING THERAPY: CPT

## 2022-06-08 PROCEDURE — 6370000000 HC RX 637 (ALT 250 FOR IP): Performed by: STUDENT IN AN ORGANIZED HEALTH CARE EDUCATION/TRAINING PROGRAM

## 2022-06-08 PROCEDURE — 1200000000 HC SEMI PRIVATE

## 2022-06-08 PROCEDURE — 97110 THERAPEUTIC EXERCISES: CPT

## 2022-06-08 PROCEDURE — 85027 COMPLETE CBC AUTOMATED: CPT

## 2022-06-08 PROCEDURE — 2580000003 HC RX 258: Performed by: NURSE PRACTITIONER

## 2022-06-08 PROCEDURE — 99232 SBSQ HOSP IP/OBS MODERATE 35: CPT | Performed by: INTERNAL MEDICINE

## 2022-06-08 PROCEDURE — 36415 COLL VENOUS BLD VENIPUNCTURE: CPT

## 2022-06-08 PROCEDURE — 6370000000 HC RX 637 (ALT 250 FOR IP): Performed by: NURSE PRACTITIONER

## 2022-06-08 PROCEDURE — 93010 ELECTROCARDIOGRAM REPORT: CPT | Performed by: INTERNAL MEDICINE

## 2022-06-08 RX ORDER — TRAZODONE HYDROCHLORIDE 50 MG/1
50 TABLET ORAL NIGHTLY PRN
Qty: 30 TABLET | Refills: 0 | Status: SHIPPED | OUTPATIENT
Start: 2022-06-08 | End: 2022-06-29

## 2022-06-08 RX ORDER — DOXYCYCLINE 100 MG/1
100 CAPSULE ORAL EVERY 12 HOURS SCHEDULED
Qty: 12 CAPSULE | Refills: 0 | Status: SHIPPED | OUTPATIENT
Start: 2022-06-08 | End: 2022-06-14

## 2022-06-08 RX ORDER — TRAZODONE HYDROCHLORIDE 50 MG/1
50 TABLET ORAL NIGHTLY PRN
Status: DISCONTINUED | OUTPATIENT
Start: 2022-06-08 | End: 2022-06-14 | Stop reason: HOSPADM

## 2022-06-08 RX ORDER — GABAPENTIN 100 MG/1
200 CAPSULE ORAL 2 TIMES DAILY
Qty: 120 CAPSULE | Refills: 0 | Status: SHIPPED | OUTPATIENT
Start: 2022-06-08 | End: 2022-08-22 | Stop reason: SDUPTHER

## 2022-06-08 RX ADMIN — APIXABAN 5 MG: 5 TABLET, FILM COATED ORAL at 08:08

## 2022-06-08 RX ADMIN — SODIUM CHLORIDE, PRESERVATIVE FREE 10 ML: 5 INJECTION INTRAVENOUS at 22:06

## 2022-06-08 RX ADMIN — BUSPIRONE HYDROCHLORIDE 5 MG: 5 TABLET ORAL at 08:08

## 2022-06-08 RX ADMIN — CYANOCOBALAMIN TAB 1000 MCG 1000 MCG: 1000 TAB at 22:07

## 2022-06-08 RX ADMIN — ATORVASTATIN CALCIUM 40 MG: 40 TABLET, FILM COATED ORAL at 22:06

## 2022-06-08 RX ADMIN — CITALOPRAM HYDROBROMIDE 20 MG: 20 TABLET ORAL at 08:08

## 2022-06-08 RX ADMIN — GABAPENTIN 200 MG: 100 CAPSULE ORAL at 08:09

## 2022-06-08 RX ADMIN — FERROUS SULFATE TAB 325 MG (65 MG ELEMENTAL FE) 325 MG: 325 (65 FE) TAB at 08:08

## 2022-06-08 RX ADMIN — BUSPIRONE HYDROCHLORIDE 5 MG: 5 TABLET ORAL at 14:29

## 2022-06-08 RX ADMIN — CALCIUM CARBONATE 600 MG (1,500 MG)-VITAMIN D3 400 UNIT TABLET 1 TABLET: at 22:06

## 2022-06-08 RX ADMIN — BUSPIRONE HYDROCHLORIDE 5 MG: 5 TABLET ORAL at 22:07

## 2022-06-08 RX ADMIN — GABAPENTIN 200 MG: 100 CAPSULE ORAL at 22:07

## 2022-06-08 RX ADMIN — DOXYCYCLINE 100 MG: 100 CAPSULE ORAL at 08:09

## 2022-06-08 RX ADMIN — FENOFIBRATE 160 MG: 160 TABLET ORAL at 08:08

## 2022-06-08 RX ADMIN — ACETAMINOPHEN 650 MG: 325 TABLET ORAL at 15:55

## 2022-06-08 RX ADMIN — FERROUS SULFATE TAB 325 MG (65 MG ELEMENTAL FE) 325 MG: 325 (65 FE) TAB at 22:06

## 2022-06-08 RX ADMIN — DOXYCYCLINE 100 MG: 100 CAPSULE ORAL at 22:07

## 2022-06-08 RX ADMIN — SODIUM CHLORIDE, PRESERVATIVE FREE 10 ML: 5 INJECTION INTRAVENOUS at 08:10

## 2022-06-08 RX ADMIN — Medication 6 MG: at 22:07

## 2022-06-08 RX ADMIN — PRAMIPEXOLE DIHYDROCHLORIDE 0.5 MG: 0.25 TABLET ORAL at 22:06

## 2022-06-08 RX ADMIN — CARVEDILOL 12.5 MG: 12.5 TABLET, FILM COATED ORAL at 22:07

## 2022-06-08 RX ADMIN — CARVEDILOL 12.5 MG: 12.5 TABLET, FILM COATED ORAL at 09:38

## 2022-06-08 RX ADMIN — FUROSEMIDE 20 MG: 20 TABLET ORAL at 08:08

## 2022-06-08 RX ADMIN — AMLODIPINE BESYLATE 5 MG: 5 TABLET ORAL at 09:38

## 2022-06-08 RX ADMIN — APIXABAN 5 MG: 5 TABLET, FILM COATED ORAL at 22:07

## 2022-06-08 RX ADMIN — SODIUM CHLORIDE, PRESERVATIVE FREE 10 ML: 5 INJECTION INTRAVENOUS at 08:09

## 2022-06-08 ASSESSMENT — PAIN SCALES - GENERAL
PAINLEVEL_OUTOF10: 5
PAINLEVEL_OUTOF10: 4
PAINLEVEL_OUTOF10: 4

## 2022-06-08 ASSESSMENT — PAIN DESCRIPTION - LOCATION
LOCATION: HEAD
LOCATION: HAND

## 2022-06-08 ASSESSMENT — PAIN DESCRIPTION - ORIENTATION: ORIENTATION: POSTERIOR

## 2022-06-08 ASSESSMENT — PAIN DESCRIPTION - DESCRIPTORS: DESCRIPTORS: ACHING

## 2022-06-08 NOTE — DISCHARGE SUMMARY
[place] [address] Internal Medicine    Discharge Summary     Patient ID: Salvador Claude  :  1950   MRN: [id number]     ACCOUNT:  [de-identified]   Patient's PCP: Janay Rooney MD  Admit Date: 2022   Discharge Date: 2022    Length of Stay: 3  Code Status:  Full Code  Admitting Physician: aD Chavez MD  Discharge Physician: Da Chavez MD     Active Discharge Diagnoses:     Primary Problem  Cellulitis of both lower extremities      [place] Problems    Diagnosis Date Noted    Cellulitis of both lower extremities [L03.115, L03.116] 2022     Priority: Medium    Stage 3 chronic kidney disease ([facility] [address] [N18.30] 2019    Type 2 diabetes mellitus with circulatory disorder, without long-term current use of insulin ([facility] [address] [E11.59] 2019       Admission Condition:  fair     Discharged Condition: fair    Hospital Stay:     Hospital Course:  Salvador Claude is a 71 y.o. female who was admitted for the management of Cellulitis of both lower extremities , presented to ER with Leg Swelling    The patient is a 71 y.o.  female, with a history of anemia, spondylolisthesis, DVT, chronic diastolic heart failure, CVA, C. difficile, HTN, CKD stage III, peptic ulcer disease, and DM type II, who presents with leg swelling. According to patient and her , legs have been increasingly edematous with progressive erythema over the past 2 weeks. Patient was admitted to this facility in March for frequent falls and found to have DVT. Patient reports that she was concerned for recurrent DVT, despite being compliant with Eliquis. Following inpatient treatment, patient received intensive therapy and acute rehab department but reports having 5 falls since that time. Patient has a history of left sided weakness from old CVA.     thinks falls are due to poor technique with transfers d/t fear of falling, plus edema makes it difficult to lift legs to walk. Symptoms are associated with painful hematoma on posterior scalp from recent fall. Patient also reports dry non-productive cough, which is residual from recent bronchitis. Denies fever, chills, chest pain, abdominal pain, nausea, vomiting, diarrhea, and urinary symptoms. There are no aggravating or alleviating factors. Symptoms are reported as constant and moderate to severe; progressively worsening. 3/9/37     · Pre-CERT started today for SNF placement. · Trazodone as needed at night for difficulty sleeping                           Significant therapeutic interventions:     Significant Diagnostic Studies:   Labs / Micro:        ,     Radiology:    CT Head WO Contrast    Result Date: 6/5/2022  EXAMINATION: CT OF THE HEAD WITHOUT CONTRAST  6/5/2022 2:31 pm TECHNIQUE: CT of the head was performed without the administration of intravenous contrast. Automated exposure control, iterative reconstruction, and/or weight based adjustment of the mA/kV was utilized to reduce the radiation dose to as low as reasonably achievable. COMPARISON: None. HISTORY: ORDERING SYSTEM PROVIDED HISTORY: fall, head injury TECHNOLOGIST PROVIDED HISTORY: fall, head injury Decision Support Exception - unselect if not a suspected or confirmed emergency medical condition->Emergency Medical Condition ([place]) Reason for Exam: fall, head injury Additional signs and symptoms: Pt states fall with head injury a few days ago FINDINGS: BRAIN/VENTRICLES: There is no acute intracranial hemorrhage, mass effect or midline shift. No abnormal extra-axial fluid collection. The gray-white differentiation is maintained without evidence of an acute infarct. There is no evidence of hydrocephalus. ORBITS: The visualized portion of the orbits demonstrate no acute abnormality. SINUSES: The visualized paranasal sinuses and mastoid air cells demonstrate no acute abnormality.   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 SOFT TISSUES/SKULL:  No acute abnormality of the visualized skull or soft tissues. No acute intracranial abnormality. CT Cervical Spine WO Contrast    Result Date: 6/5/2022  EXAMINATION: CT OF THE CERVICAL SPINE WITHOUT CONTRAST 6/5/2022 11:33 am TECHNIQUE: CT of the cervical spine was performed without the administration of intravenous contrast. Multiplanar reformatted images are provided for review. Automated exposure control, iterative reconstruction, and/or weight based adjustment of the mA/kV was utilized to reduce the radiation dose to as low as reasonably achievable. COMPARISON: 03/16/2022 HISTORY: ORDERING SYSTEM PROVIDED HISTORY: fall, neck pain TECHNOLOGIST PROVIDED HISTORY: fall, neck pain Decision Support Exception - unselect if not a suspected or confirmed emergency medical condition->Emergency Medical Condition ([place]) Reason for Exam: fall, head injury Additional signs and symptoms: Pt states fall a few days ago and complains of neck pain FINDINGS: BONES/ALIGNMENT: There is stable E6-R9 posterior metallic fusion with associated laminectomies at this level. No evidence of instrumentation loosening or failure. Stable reversal of the normal cervical lordosis. No fracture or osseous destructive lesion. DEGENERATIVE CHANGES: Multilevel degenerative disc disease is noted in the cervical spine which is mild in severity. This is greatest at C6-C7. SOFT TISSUES: Vascular calcifications are noted along the aorta. The lung apices are clear. The thyroid gland is heterogeneous. There is a right-sided thyroid nodule that is low in attenuation measuring 4 mm, benign. No follow-up imaging is required. Vascular calcifications are noted in the carotid bulbs. No acute osseous abnormality of the cervical spine.   No significant change from prior exam     XR CHEST PORTABLE    Result Date: 6/5/2022  EXAMINATION: ONE XRAY VIEW OF THE CHEST 6/5/2022 2:15 pm COMPARISON: April 8, 2022 HISTORY: ORDERING SYSTEM PROVIDED HISTORY: shortness of breath TECHNOLOGIST PROVIDED HISTORY: shortness of breath Reason for Exam: Shortness of breath FINDINGS: The lungs are without acute focal process. There is no effusion or pneumothorax. The cardiomediastinal silhouette is without acute process. The osseous structures are without acute process. No acute process. XR CERVICAL SPINE FLEXION AND EXTENSION    Result Date: 5/10/2022  EXAMINATION: 2 XRAY VIEWS OF THE CERVICAL SPINE 5/10/2022 10:29 am COMPARISON: None. HISTORY: ORDERING SYSTEM PROVIDED HISTORY: Ataxia TECHNOLOGIST PROVIDED HISTORY: eval for stability at C6-7 FINDINGS: Thoracic kyphosis with straightening of the cervical spine and hyperflexion. Posterior interbody fusion rods and pedicular screws C3 through C6. Slight anterior subluxation C6-7. Moderate spondylosis. Hardware appears intact. Flexion-extension views demonstrate limited motion and no evidence of ena instability. Moderate spondylosis and disc space narrowing at C5-6. No prevertebral swelling. Posterior interbody fusion C3 through C6. Slight anterior subluxation C6-7. No ena instability. VL Lower Extremity Bilateral Venous Duplex    Result Date: 6/5/2022    [facility]  Vascular Lower Extremities DVT Study Procedure   Patient Name   Alana Ortiz       Date of Study           06/05/2022                 Rita Amador   Date of Birth  1950  Gender                  Female   Age            71 year(s)  Race                       Room Number    SANDIP   Corporate ID # [id number]   Patient Acct # [de-identified]   MR #           [id number]      Sonographer             Jordan Carnes   Accession #    [id number]  Interpreting Physician  Cindy Nicole   Referring                  Referring Physician     Jerel Flores  Nurse  Practitioner  Procedure Type of Study:   Veins: Lower Extremities DVT Study, Venous Scan Lower Bilateral.  Indications for Study:Leg Swelling. Patient Status:ER. Technical Quality:Limited visualization. Limitation reason:swelling. Comments: Indications: leg swelling, wells score of 4. Patient indicates she is on Eliquis twice a day and has a history of cellulitis. Conclusions   Summary   No evidence of superficial or deep venous thrombosis in both lower  extremities. Signature   ----------------------------------------------------------------  Electronically signed by Jordan Carnes(Sonographer) on  06/05/2022 08:33 PM  ----------------------------------------------------------------   ----------------------------------------------------------------  Electronically signed by Florance Seip Reyes,Arthur(Interpreting  physician) on 06/05/2022 09:59 PM  ----------------------------------------------------------------  Findings:   Right Impression:                    Left Impression:   The common femoral, proximal         The common femoral, proximal femoral,  femoral, and popliteal veins         and popliteal veins demonstrate  demonstrate normal compressibility. normal compressibility. Normal compressibility of the great  Normal compressibility of the great  saphenous vein. saphenous vein. Normal compressibility of the small  Normal compressibility of the small  saphenous vein. saphenous vein. Velocities are measured in cm/s ; Diameters are measured in cm Right Lower Extremities DVT Study Measurements Right 2D Measurements +------------------------------------+----------+---------------+----------+ ! Location                            ! Visualized! Compressibility! Thrombosis! +------------------------------------+----------+---------------+----------+ ! Common Femoral                      !Yes       ! Yes            ! None      ! +------------------------------------+----------+---------------+----------+ ! Prox Femoral                        !Yes       ! Yes            ! None      ! +------------------------------------+----------+---------------+----------+ ! Mid Femoral                         !No        !               !          ! +------------------------------------+----------+---------------+----------+ ! Dist Femoral                        !No        !               !          ! +------------------------------------+----------+---------------+----------+ ! Deep Femoral                        !No        !               !          ! +------------------------------------+----------+---------------+----------+ ! Popliteal                           !Yes       ! Yes            ! None      ! +------------------------------------+----------+---------------+----------+ ! Sapheno Femoral Junction            ! Yes       ! Yes            ! None      ! +------------------------------------+----------+---------------+----------+ ! PTV                                 ! Partial   !Yes            ! None      ! +------------------------------------+----------+---------------+----------+ ! Peroneal                            !No        !               !          ! +------------------------------------+----------+---------------+----------+ ! Gastroc                             ! Partial   !Yes            ! None      ! +------------------------------------+----------+---------------+----------+ ! GSV Thigh                           ! Yes       ! Yes            ! None      ! +------------------------------------+----------+---------------+----------+ ! GSV Knee                            ! Yes       ! Yes            ! None      ! +------------------------------------+----------+---------------+----------+ ! GSV Ankle                           ! Yes       ! Yes            ! None      ! +------------------------------------+----------+---------------+----------+ ! SSV                                 ! Yes       ! Yes            ! None      ! +------------------------------------+----------+---------------+----------+ Right Doppler Measurements +---------------------------+------+------+--------------------------------+ ! Location                   ! Signal!Reflux! Reflux (msec)                   ! +---------------------------+------+------+--------------------------------+ ! Common Femoral             !Phasic!      !                                ! +---------------------------+------+------+--------------------------------+ ! Prox Femoral               !Phasic!      !                                ! +---------------------------+------+------+--------------------------------+ ! Popliteal                  !Phasic!      !                                ! +---------------------------+------+------+--------------------------------+ Left Lower Extremities DVT Study Measurements Left 2D Measurements +------------------------------------+----------+---------------+----------+ ! Location                            ! Visualized! Compressibility! Thrombosis! +------------------------------------+----------+---------------+----------+ ! Common Femoral                      !Yes       ! Yes            ! None      ! +------------------------------------+----------+---------------+----------+ ! Prox Femoral                        !Yes       ! Yes            ! None      ! +------------------------------------+----------+---------------+----------+ ! Mid Femoral                         !No        !               !          ! +------------------------------------+----------+---------------+----------+ ! Dist Femoral                        !No        !               !          ! +------------------------------------+----------+---------------+----------+ ! Deep Femoral                        !No        !               !          ! +------------------------------------+----------+---------------+----------+ ! Popliteal                           !Yes       ! Yes            ! None      ! +------------------------------------+----------+---------------+----------+ ! Odilia Femoral Junction            ! Yes       ! Yes            ! None      ! +------------------------------------+----------+---------------+----------+ ! PTV                                 ! Partial   !Yes            ! None      ! +------------------------------------+----------+---------------+----------+ ! Peroneal                            !No        !               !          ! +------------------------------------+----------+---------------+----------+ ! Gastroc                             ! Partial   !Yes            ! None      ! +------------------------------------+----------+---------------+----------+ ! GSV Thigh                           ! Yes       ! Yes            ! None      ! +------------------------------------+----------+---------------+----------+ ! GSV Knee                            ! Yes       ! Yes            ! None      ! +------------------------------------+----------+---------------+----------+ ! GSV Ankle                           ! Yes       ! Yes            ! None      ! +------------------------------------+----------+---------------+----------+ ! SSV                                 ! Yes       ! Yes            ! None      ! +------------------------------------+----------+---------------+----------+ Left Doppler Measurements +---------------------------+------+------+--------------------------------+ ! Location                   ! Signal!Reflux! Reflux (msec)                   ! +---------------------------+------+------+--------------------------------+ ! Common Femoral             !Phasic! No    !                                ! +---------------------------+------+------+--------------------------------+ ! Prox Femoral               !Phasic!      !                                ! +---------------------------+------+------+--------------------------------+ ! Popliteal                  !Phasic!      !                                ! +---------------------------+------+------+--------------------------------+        Consultations:    Consults:     Final Specialist Recommendations/Findings:   PHARMACY TO DOSE VANCOMYCIN  IP CONSULT TO SOCIAL WORK      The patient was seen and examined on day of discharge and this discharge summary is in conjunction with any daily progress note from day of discharge. Discharge plan:     Disposition: Home    Physician Follow Up:     MD Chantel Morris [address]  [facility] [phone]  [phone]    Call on 7/20/2022  @2:15 pm    [place] [phone]    they will call to schedule a time to come out to the house.         Requiring Further Evaluation/Follow Up POST HOSPITALIZATION/Incidental Findings:    Diet: cardiac diet    Activity: As tolerated    Instructions to Patient:     Discharge Medications:      Medication List      START taking these medications    doxycycline monohydrate 100 MG capsule  Commonly known as: MONODOX  Take 1 capsule by mouth every 12 hours for 12 doses     traZODone 50 MG tablet  Commonly known as: DESYREL  Take 1 tablet by mouth nightly as needed for Sleep        CHANGE how you take these medications    cyanocobalamin 1000 MCG tablet  Commonly known as: [employer] VITAMIN B12  Take 1 tablet by mouth daily  What changed: when to take this        CONTINUE taking these medications    acetaminophen 325 mg tablet  Commonly known as: TYLENOL  Take 2 tablets by mouth every 4 hours as needed for Pain     albuterol-ipratropium  MCG/ACT Aers inhaler  Commonly known as: COMBIVENT RESPIMAT  Inhale 1 puff into the lungs every 6 hours as needed for Wheezing or Shortness of Breath     amLODIPine 5 MG tablet  Commonly known as: Norvasc  Take 1 tablet by mouth daily     apixaban 5 MG Tabs tablet  Commonly known as: Eliquis  Take 1 tablet by mouth 2 times daily     atorvastatin 40 MG tablet  Commonly known as: LIPITOR  Take 1 tablet by mouth nightly     blood glucose test strips  Test 2 times a day & as needed for symptoms of irregular blood glucose. busPIRone 5 MG tablet  Commonly known as: BUSPAR  Take 1 tablet by mouth 3 times daily     Calcium 500/D 500-125 MG-UNIT Tabs  Generic drug: Calcium Carbonate-Vitamin D     carvedilol 12.5 MG tablet  Commonly known as: COREG  Take 1 tablet by mouth 2 times daily     citalopram 20 mg tablet  Commonly known as: CELEXA  Take 1 tablet by mouth daily     fenofibrate micronized 134 MG capsule  Commonly known as: LOFIBRA  Take 1 capsule by mouth every morning (before breakfast)     ferrous sulfate 325 (65 Fe) MG tablet  Commonly known as: IRON 325  Take 1 tablet by mouth 2 times daily     furosemide 20 MG tablet  Commonly known as: LASIX  Take 1 tablet by mouth daily     gabapentin 100 MG capsule  Commonly known as: NEURONTIN  Take 2 capsules by mouth 2 times daily for 30 days.      Lancets Misc  1 each by Does not apply route daily     melatonin 3 mg Tabs tablet  Take 2 tablets by mouth nightly as needed (insomnia)     nitroGLYCERIN 0.4 MG SL tablet  Commonly known as: Nitrostat  Place 1 tablet under the tongue every 5 minutes as needed for Chest pain     pramipexole 0.5 MG tablet  Commonly known as: Mirapex  Take 1 tablet by mouth nightly     VITAMIN D (ERGOCALCIFEROL) PO        STOP taking these medications    losartan 100 MG tablet  Commonly known as: COZAAR           Where to Get Your Medications      These medications were sent to [address] [phone]  [address] [place] [id number]    Phone: [phone]   · doxycycline monohydrate 100 MG capsule  · traZODone 50 MG tablet     You can get these medications from any pharmacy    Bring a paper prescription for each of these medications  · gabapentin 100 MG capsule         Time Spent on discharge is  35 mins in patient examination, evaluation, counseling as well as medication reconciliation, prescriptions for required medications, discharge plan and follow up. Electronically signed by   Gunjan Barbour MD  6/8/2022  3:57 PM      Thank you Dr. Sterling Pritchard MD for the opportunity to be involved in this patient's care.

## 2022-06-08 NOTE — PROGRESS NOTES
[place] [address] Internal Medicine    Progress Note     6/8/2022    12:17 PM    Name:   Peng Carson  MRN:     [id number]     Acct:      [de-identified]   Room:   2064/206401   Day:  3  Admit Date:  6/5/2022  1:47 PM    PCP:   Damon Lama MD  Code Status:  Full Code    Subjective:     C/C:   Chief Complaint   Patient presents with    Leg Swelling     Principal Problem:    Cellulitis of both lower extremities  Active Problems:    Stage 3 chronic kidney disease ([facility] [address]    Type 2 diabetes mellitus with circulatory disorder, without long-term current use of insulin ([facility] [address]  Resolved Problems:    * No resolved hospital problems. *      6/8/22    Vitals stable. No acute issues overnight  Patient's leg ulcers look slightly better today.   She is working with PT/OT  Pre-CERT started today for SNF placement  Patient has been issues sleeping at night, will order trazodone as needed at night                 Significant last 24 hr data reviewed ;   Vitals:    06/07/22 2115 06/08/22 0715 06/08/22 0915 06/08/22 1145   BP: (!) 139/54 87/75 (!) 144/76 (!) 144/61   Pulse: 80 77 70 73   Resp:  16  16   Temp:  97.5 °F (36.4 °C)  97.5 °F (36.4 °C)   TempSrc:  Oral  Oral   SpO2:  98%  97%   Weight:       Height:          Recent Results (from the past 24 hour(s))   CBC    Collection Time: 06/08/22  6:24 AM   Result Value Ref Range    WBC 5.2 3.5 - 11.0 k/uL    RBC 3.39 (L) 4.0 - 5.2 m/uL    Hemoglobin 11.0 (L) 12.0 - 16.0 g/dL    Hematocrit 32.1 (L) 36 - 46 %    MCV 94.8 80 - 100 fL    MCH 32.3 26 - 34 pg    MCHC 34.1 31 - 37 g/dL    RDW 13.8 11.5 - 14.9 %    Platelets 747 586 - 657 k/uL    MPV 6.6 6.0 - 12.0 fL     Recent Labs     06/06/22  6249   POCGLU 113*      CT Head WO Contrast    Result Date: 6/5/2022  EXAMINATION: CT OF THE HEAD WITHOUT CONTRAST  6/5/2022 2:31 pm TECHNIQUE: CT of the head was performed without the administration of intravenous contrast. Automated exposure control, iterative reconstruction, and/or weight based adjustment of the mA/kV was utilized to reduce the radiation dose to as low as reasonably achievable. COMPARISON: None. HISTORY: ORDERING SYSTEM PROVIDED HISTORY: fall, head injury TECHNOLOGIST PROVIDED HISTORY: fall, head injury Decision Support Exception - unselect if not a suspected or confirmed emergency medical condition->Emergency Medical Condition (MA) Reason for Exam: fall, head injury Additional signs and symptoms: Pt states fall with head injury a few days ago FINDINGS: BRAIN/VENTRICLES: There is no acute intracranial hemorrhage, mass effect or midline shift. No abnormal extra-axial fluid collection. The gray-white differentiation is maintained without evidence of an acute infarct. There is no evidence of hydrocephalus. ORBITS: The visualized portion of the orbits demonstrate no acute abnormality. SINUSES: The visualized paranasal sinuses and mastoid air cells demonstrate no acute abnormality. 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 SOFT TISSUES/SKULL:  No acute abnormality of the visualized skull or soft tissues. No acute intracranial abnormality. CT Cervical Spine WO Contrast    Result Date: 6/5/2022  EXAMINATION: CT OF THE CERVICAL SPINE WITHOUT CONTRAST 6/5/2022 11:33 am TECHNIQUE: CT of the cervical spine was performed without the administration of intravenous contrast. Multiplanar reformatted images are provided for review. Automated exposure control, iterative reconstruction, and/or weight based adjustment of the mA/kV was utilized to reduce the radiation dose to as low as reasonably achievable.  COMPARISON: 03/16/2022 HISTORY: ORDERING SYSTEM PROVIDED HISTORY: fall, neck pain TECHNOLOGIST PROVIDED HISTORY: fall, neck pain Decision Support Exception - unselect if not a suspected or confirmed emergency medical condition->Emergency Medical Condition (MA) Reason for Exam: fall, head injury Additional signs and symptoms: Pt states fall a few days ago and complains of neck pain FINDINGS: BONES/ALIGNMENT: There is stable R9-E3 posterior metallic fusion with associated laminectomies at this level. No evidence of instrumentation loosening or failure. Stable reversal of the normal cervical lordosis. No fracture or osseous destructive lesion. DEGENERATIVE CHANGES: Multilevel degenerative disc disease is noted in the cervical spine which is mild in severity. This is greatest at C6-C7. SOFT TISSUES: Vascular calcifications are noted along the aorta. The lung apices are clear. The thyroid gland is heterogeneous. There is a right-sided thyroid nodule that is low in attenuation measuring 4 mm, benign. No follow-up imaging is required. Vascular calcifications are noted in the carotid bulbs. No acute osseous abnormality of the cervical spine. No significant change from prior exam     XR CHEST PORTABLE    Result Date: 6/5/2022  EXAMINATION: ONE XRAY VIEW OF THE CHEST 6/5/2022 2:15 pm COMPARISON: April 8, 2022 HISTORY: ORDERING SYSTEM PROVIDED HISTORY: shortness of breath TECHNOLOGIST PROVIDED HISTORY: shortness of breath Reason for Exam: Shortness of breath FINDINGS: The lungs are without acute focal process. There is no effusion or pneumothorax. The cardiomediastinal silhouette is without acute process. The osseous structures are without acute process. No acute process.      VL Lower Extremity Bilateral Venous Duplex    Result Date: 6/5/2022    [facility]  Vascular Lower Extremities DVT Study Procedure   Patient Name   Navya Vasques       Date of Study           06/05/2022                 Maria Antonia Rothman   Date of Birth  1950  Gender                  Female   Age            71 year(s)  Race                       Room Number    SANDIP   Corporate ID # [id number]   Patient Acct # [de-identified]   MR #           [id number]      Sonographer             Jordan Carnes   Accession #    [id number]  Interpreting Physician  Fina Ji   Referring                  Referring Physician     Lisa Segura  Nurse  Practitioner  Procedure Type of Study:   Veins: Lower Extremities DVT Study, Venous Scan Lower Bilateral.  Indications for Study:Leg Swelling. Patient Status:ER. Technical Quality:Limited visualization. Limitation reason:swelling. Comments: Indications: leg swelling, wells score of 4. Patient indicates she is on Eliquis twice a day and has a history of cellulitis. Conclusions   Summary   No evidence of superficial or deep venous thrombosis in both lower  extremities. Signature   ----------------------------------------------------------------  Electronically signed by Jordan Carnes(Sonographer) on  06/05/2022 08:33 PM  ----------------------------------------------------------------   ----------------------------------------------------------------  Electronically signed by Marlowe Gardener Reyes,Arthur(Interpreting  physician) on 06/05/2022 09:59 PM  ----------------------------------------------------------------  Findings:   Right Impression:                    Left Impression:   The common femoral, proximal         The common femoral, proximal femoral,  femoral, and popliteal veins         and popliteal veins demonstrate  demonstrate normal compressibility. normal compressibility. Normal compressibility of the great  Normal compressibility of the great  saphenous vein. saphenous vein. Normal compressibility of the small  Normal compressibility of the small  saphenous vein. saphenous vein. Velocities are measured in cm/s ; Diameters are measured in cm Right Lower Extremities DVT Study Measurements Right 2D Measurements +------------------------------------+----------+---------------+----------+ ! Location                            ! Visualized! Compressibility! Thrombosis! +------------------------------------+----------+---------------+----------+ ! Common Femoral !Yes       !Yes            ! None      ! +------------------------------------+----------+---------------+----------+ ! Prox Femoral                        !Yes       ! Yes            ! None      ! +------------------------------------+----------+---------------+----------+ ! Mid Femoral                         !No        !               !          ! +------------------------------------+----------+---------------+----------+ ! Dist Femoral                        !No        !               !          ! +------------------------------------+----------+---------------+----------+ ! Deep Femoral                        !No        !               !          ! +------------------------------------+----------+---------------+----------+ ! Popliteal                           !Yes       ! Yes            ! None      ! +------------------------------------+----------+---------------+----------+ ! Sapheno Femoral Junction            ! Yes       ! Yes            ! None      ! +------------------------------------+----------+---------------+----------+ ! PTV                                 ! Partial   !Yes            ! None      ! +------------------------------------+----------+---------------+----------+ ! Peroneal                            !No        !               !          ! +------------------------------------+----------+---------------+----------+ ! Gastroc                             ! Partial   !Yes            ! None      ! +------------------------------------+----------+---------------+----------+ ! GSV Thigh                           ! Yes       ! Yes            ! None      ! +------------------------------------+----------+---------------+----------+ ! GSV Knee                            ! Yes       ! Yes            ! None      ! +------------------------------------+----------+---------------+----------+ ! GSV Ankle                           ! Yes       ! Yes            ! None      ! +------------------------------------+----------+---------------+----------+ ! SSV                                 ! Yes       ! Yes            ! None      ! +------------------------------------+----------+---------------+----------+ Right Doppler Measurements +---------------------------+------+------+--------------------------------+ ! Location                   ! Signal!Reflux! Reflux (msec)                   ! +---------------------------+------+------+--------------------------------+ ! Common Femoral             !Phasic!      !                                ! +---------------------------+------+------+--------------------------------+ ! Prox Femoral               !Phasic!      !                                ! +---------------------------+------+------+--------------------------------+ ! Popliteal                  !Phasic!      !                                ! +---------------------------+------+------+--------------------------------+ Left Lower Extremities DVT Study Measurements Left 2D Measurements +------------------------------------+----------+---------------+----------+ ! Location                            ! Visualized! Compressibility! Thrombosis! +------------------------------------+----------+---------------+----------+ ! Common Femoral                      !Yes       ! Yes            ! None      ! +------------------------------------+----------+---------------+----------+ ! Prox Femoral                        !Yes       ! Yes            ! None      ! +------------------------------------+----------+---------------+----------+ ! Mid Femoral                         !No        !               !          ! +------------------------------------+----------+---------------+----------+ ! Dist Femoral                        !No        !               !          ! +------------------------------------+----------+---------------+----------+ ! Deep Femoral                        !No        !               !          ! +------------------------------------+----------+---------------+----------+ ! Popliteal                           !Yes       ! Yes            ! None      ! +------------------------------------+----------+---------------+----------+ ! Sapheno Femoral Junction            ! Yes       ! Yes            ! None      ! +------------------------------------+----------+---------------+----------+ ! PTV                                 ! Partial   !Yes            ! None      ! +------------------------------------+----------+---------------+----------+ ! Peroneal                            !No        !               !          ! +------------------------------------+----------+---------------+----------+ ! Gastroc                             ! Partial   !Yes            ! None      ! +------------------------------------+----------+---------------+----------+ ! GSV Thigh                           ! Yes       ! Yes            ! None      ! +------------------------------------+----------+---------------+----------+ ! GSV Knee                            ! Yes       ! Yes            ! None      ! +------------------------------------+----------+---------------+----------+ ! GSV Ankle                           ! Yes       ! Yes            ! None      ! +------------------------------------+----------+---------------+----------+ ! SSV                                 ! Yes       ! Yes            ! None      ! +------------------------------------+----------+---------------+----------+ Left Doppler Measurements +---------------------------+------+------+--------------------------------+ ! Location                   ! Signal!Reflux! Reflux (msec)                   ! +---------------------------+------+------+--------------------------------+ ! Common Femoral             !Phasic! No    !                                ! +---------------------------+------+------+--------------------------------+ ! Prox Femoral               !Phasic!      ! ! +---------------------------+------+------+--------------------------------+ ! Popliteal                  !Phasic!      !                                ! +---------------------------+------+------+--------------------------------+          On admission     The patient is a 71 y.o.  female, with a history of anemia, spondylolisthesis, DVT, chronic diastolic heart failure, CVA, C. difficile, HTN, CKD stage III, peptic ulcer disease, and DM type II, who presents with leg swelling. According to patient and her , legs have been increasingly edematous with progressive erythema over the past 2 weeks. Patient was admitted to this facility in March for frequent falls and found to have DVT. Patient reports that she was concerned for recurrent DVT, despite being compliant with Eliquis. Following inpatient treatment, patient received intensive therapy and acute rehab department but reports having 5 falls since that time. Patient has a history of left sided weakness from old CVA.  thinks falls are due to poor technique with transfers d/t fear of falling, plus edema makes it difficult to lift legs to walk. Symptoms are associated with painful hematoma on posterior scalp from recent fall. Patient also reports dry non-productive cough, which is residual from recent bronchitis. Denies fever, chills, chest pain, abdominal pain, nausea, vomiting, diarrhea, and urinary symptoms. There are no aggravating or alleviating factors. Symptoms are reported as constant and moderate to severe; progressively worsening. Review of Systems:     Constitutional: Negative for chills, diaphoresis and fever. HENT: Negative for congestion and sore throat. Respiratory: Positive for cough (dry, nonproductive). Negative for shortness of breath and wheezing. Cardiovascular: Positive for leg swelling (reports weeping edema). Negative for chest pain and palpitations.    Gastrointestinal: Negative for abdominal pain, constipation, diarrhea, nausea and vomiting. Genitourinary: Negative for dysuria, frequency and urgency. Musculoskeletal: Negative for back pain and myalgias. Skin: Positive for color change (erythema, BLE). Negative for rash. Neurological: Positive for weakness (generalized weakness) and headaches (pain on posterior scalp from recent fall. ). Negative for dizziness. Psychiatric/Behavioral: The patient is not nervous/anxious. Data:     Past Medical History: No change     Social History: No change    Family History: @no change    Vitals:  Reviewed    I/O (24Hr): No intake or output data in the 24 hours ending 22 1217  Labs:  CBC from today. Creatine from today.      Radiology:  N/A    Medications:     Current Meds:   Scheduled Meds:    doxycycline monohydrate  100 mg Oral 2 times per day    furosemide  20 mg Oral Daily    amLODIPine  5 mg Oral Daily    apixaban  5 mg Oral BID    atorvastatin  40 mg Oral Nightly    busPIRone  5 mg Oral TID    calcium carb-cholecalciferol  1 tablet Oral Nightly    carvedilol  12.5 mg Oral BID    citalopram  20 mg Oral Daily    cyanocobalamin  1,000 mcg Oral Nightly    fenofibrate  160 mg Oral Daily    ferrous sulfate  325 mg Oral BID    gabapentin  200 mg Oral BID    pramipexole  0.5 mg Oral Nightly    sodium chloride flush  5-40 mL IntraVENous 2 times per day     Continuous Infusions:    sodium chloride 20 mL/hr at 22 2322     PRN Meds: melatonin, sodium chloride flush, sodium chloride, potassium chloride **OR** potassium alternative oral replacement **OR** potassium chloride, magnesium sulfate, ondansetron **OR** ondansetron, polyethylene glycol, acetaminophen **OR** acetaminophen    Physical Examination:        BP (!) 144/61   Pulse 73   Temp 97.5 °F (36.4 °C) (Oral)   Resp 16   Ht 5' 3\" (1.6 m)   Wt 180 lb (81.6 kg)   SpO2 97%   BMI 31.89 kg/m²   Temp (24hrs), Av.1 °F (36.7 °C), Min:97.5 °F (36.4 °C), Max:98.8 °F (37.1 °C)    Recent Labs     06/06/22  0632   POCGLU 113*     No intake or output data in the 24 hours ending 06/08/22 1217  Constitutional:       General: She is not in acute distress. Appearance: She is well-developed. She is not diaphoretic. HENT:      Head: Normocephalic and atraumatic. Eyes:      Conjunctiva/sclera: Conjunctivae normal.      Pupils: Pupils are equal, round, and reactive to light. Neck:      Trachea: No tracheal deviation. Cardiovascular:      Rate and Rhythm: Normal rate and regular rhythm. Heart sounds: Normal heart sounds. No murmur heard. No friction rub. No gallop. Pulmonary:      Effort: Pulmonary effort is normal. No respiratory distress. Breath sounds: Normal breath sounds. No wheezing or rales. Chest:      Chest wall: No tenderness. Abdominal:      General: Bowel sounds are normal. There is no distension. Palpations: Abdomen is soft. Tenderness: There is no abdominal tenderness. There is no guarding. Musculoskeletal:         General: No tenderness. Normal range of motion. Cervical back: Normal range of motion and neck supple. Right lower leg: Edema (1+) present. Left lower leg: Edema (1+) present. Comments: Patient with 1+ pitting edema bilateral lower extremities that extends up your groin. Bilateral lower legs are mildly erythematous with increased warmth. There are multiple small scabbed areas on bilateral shins. Lymphadenopathy:      Cervical: No cervical adenopathy. Skin:     General: Skin is warm and dry. Coloration: Skin is not pale. Findings: Erythema (BLE) present. No rash. Neurological:      Mental Status: She is alert and oriented to person, place, and time. Motor: No seizure activity. Coordination: Coordination normal.   Psychiatric:         Behavior: Behavior normal.         Thought Content:  Thought content normal.     No lower extremity calf tenderness bilaterally   Redness/Induration of anterior legs bilaterally reduced     Assessment:        Primary Problem  Cellulitis of both lower extremities    Principal Problem:    Cellulitis of both lower extremities  Active Problems:    Stage 3 chronic kidney disease (HCC)    Type 2 diabetes mellitus with circulatory disorder, without long-term current use of insulin (HCC)  Resolved Problems:    * No resolved hospital problems. *     Plan:          2/0/[birth year]    Pre-CERT started today for SNF placement. Trazodone as needed at night for difficulty sleeping           Chidi Rodgers MD  PGY-3 Internal Medicine Resident  [address] [place] [address]  6/8/2022 12:19 PM    Attestation and add on       I have discussed the care of Salvador Claude , including pertinent history and exam findings,      6/8/22    with the resident. I have seen and examined the patient and the key elements of all parts of the encounter have been performed by me . I agree with the assessment, plan and orders as documented by the resident. Hospital Problems           Last Modified POA    * (Principal) Cellulitis of both lower extremities 6/5/2022 Yes    Stage 3 chronic kidney disease ([facility] [address] 6/6/2022 Yes    Type 2 diabetes mellitus with circulatory disorder, without long-term current use of insulin ([facility] [address] 6/6/2022 Yes             ''''''''''       MD GENA Boswell[address], [address] [place] [address].    Phone [phone]   Fax: [phone]  Answering Service: [phone]

## 2022-06-08 NOTE — CARE COORDINATION
SW informed that [facility] is also OON. SW contacted [facility], Patient's first choice, and they are going to accept Patient, and start Precert today 6/8. SW was informed that they will inform SW \"later today\" on what their bed availability will be as she was not sure at this time.      Electronically signed by Rhonda Ruiz on 6/8/22 at 9:04 AM EDT

## 2022-06-08 NOTE — PROGRESS NOTES
technique. Added reistive ex's with orange theraband x10 in sitting             Goals  Short Term Goals  Time Frame for Short term goals: 5-7 days  Short term goal 1: Pt will tolerate one thirty minute therapeutic exercise session to show improvment in activity tolerance. Short term goal 2: Pt will improve one half MMT grade on L LE to show improvement in strength. Short term goal 3: Pt will be able to to half tandem stance with L LE posterior for 1-2 minutes to show ability to WB and maintain balance. Short term goal 4: Pt will be able to ambulate one 8\" step with no HR and least restrictive device and MinAx1 to be able to enter home. Short term goal 5: Pt will be able to ambulate 50-60ft with least restrictive device and CGAx1 to show improvement for distance walked. Additional Goals?: Yes  Short Term Goal 6: Pt will achieve a sit<>stand with CGAx1 with least restrictive device to show improvement in overall transfers. Patient Goals   Patient goals : to return home to spouse    Education  Patient Education  Education Given To: Patient  Education Provided: Energy Conservation;Transfer Training; Fall Prevention Strategies; Home Exercise Program  Education Method: Demonstration;Verbal;Teach Back  Barriers to Learning: None  Education Outcome: Verbalized understanding;Continued education needed    Safety measures utilized: Gait belt, Call light within reach, Sitting in chair and Chair alarm    Therapy Time   Individual Concurrent Group Co-treatment   Time In 1409         Time Out 1435         Minutes [address] [place], [place]

## 2022-06-08 NOTE — PLAN OF CARE
Problem: Discharge Planning  Goal: Discharge to home or other facility with appropriate resources  Outcome: Progressing  Note: [facility]. Problem: Skin/Tissue Integrity  Goal: Absence of new skin breakdown  Description: 1. Monitor for areas of redness and/or skin breakdown  2. Assess vascular access sites hourly  3. Every 4-6 hours minimum:  Change oxygen saturation probe site  4. Every 4-6 hours:  If on nasal continuous positive airway pressure, respiratory therapy assess nares and determine need for appliance change or resting period. Outcome: Progressing     Problem: Safety - Adult  Goal: Free from fall injury  Outcome: Progressing  Note: Patient is alert and oriented and able to make needs known.      Problem: Chronic Conditions and Co-morbidities  Goal: Patient's chronic conditions and co-morbidity symptoms are monitored and maintained or improved  Outcome: Progressing     Problem: ABCDS Injury Assessment  Goal: Absence of physical injury  Outcome: Progressing     Problem: Pain  Goal: Verbalizes/displays adequate comfort level or baseline comfort level  Outcome: Progressing

## 2022-06-08 NOTE — FLOWSHEET NOTE
Patient stated she is \"feeling good today\"; welcomed prayer     06/08/22 1614   Encounter Summary   Encounter Overview/Reason  Spiritual/Emotional Needs   Service Provided For: Patient   Referral/Consult From: Mike   Last Encounter  06/08/22   Complexity of Encounter Low   Spiritual/Emotional needs   Type Spiritual Support   Assessment/Intervention/Outcome   Assessment Calm;Coping; Hopeful   Intervention Discussed illness injury and its impact; Explored/Affirmed feelings, thoughts, concerns;Nurtured Hope;Sustaining Presence/Ministry of presence   Outcome Coping;Engaged in conversation;Expressed feelings, needs, and concerns;Expressed Gratitude;Receptive

## 2022-06-08 NOTE — CARE COORDINATION
Josephine started today for [facility]. They reported that they will have a bed open tomorrow or Friday. Patient notified of this as well  LSW will continue to follow.      Electronically signed by Lolly Salinas on 6/8/22 at 2:56 PM EDT

## 2022-06-09 LAB
HCT VFR BLD CALC: 32.7 % (ref 36–46)
HEMOGLOBIN: 10.9 G/DL (ref 12–16)
MCH RBC QN AUTO: 31.7 PG (ref 26–34)
MCHC RBC AUTO-ENTMCNC: 33.2 G/DL (ref 31–37)
MCV RBC AUTO: 95.5 FL (ref 80–100)
PDW BLD-RTO: 13.5 % (ref 11.5–14.9)
PLATELET # BLD: 344 K/UL (ref 150–450)
PMV BLD AUTO: 6.7 FL (ref 6–12)
RBC # BLD: 3.43 M/UL (ref 4–5.2)
WBC # BLD: 5.2 K/UL (ref 3.5–11)

## 2022-06-09 PROCEDURE — 99232 SBSQ HOSP IP/OBS MODERATE 35: CPT | Performed by: INTERNAL MEDICINE

## 2022-06-09 PROCEDURE — 2580000003 HC RX 258: Performed by: NURSE PRACTITIONER

## 2022-06-09 PROCEDURE — 97110 THERAPEUTIC EXERCISES: CPT

## 2022-06-09 PROCEDURE — 85027 COMPLETE CBC AUTOMATED: CPT

## 2022-06-09 PROCEDURE — 97535 SELF CARE MNGMENT TRAINING: CPT

## 2022-06-09 PROCEDURE — 1200000000 HC SEMI PRIVATE

## 2022-06-09 PROCEDURE — 97116 GAIT TRAINING THERAPY: CPT

## 2022-06-09 PROCEDURE — 36415 COLL VENOUS BLD VENIPUNCTURE: CPT

## 2022-06-09 PROCEDURE — 6370000000 HC RX 637 (ALT 250 FOR IP): Performed by: STUDENT IN AN ORGANIZED HEALTH CARE EDUCATION/TRAINING PROGRAM

## 2022-06-09 PROCEDURE — 6370000000 HC RX 637 (ALT 250 FOR IP): Performed by: NURSE PRACTITIONER

## 2022-06-09 RX ADMIN — CITALOPRAM HYDROBROMIDE 20 MG: 20 TABLET ORAL at 08:49

## 2022-06-09 RX ADMIN — GABAPENTIN 200 MG: 100 CAPSULE ORAL at 21:24

## 2022-06-09 RX ADMIN — ACETAMINOPHEN 650 MG: 325 TABLET ORAL at 08:53

## 2022-06-09 RX ADMIN — ATORVASTATIN CALCIUM 40 MG: 40 TABLET, FILM COATED ORAL at 21:23

## 2022-06-09 RX ADMIN — DOXYCYCLINE 100 MG: 100 CAPSULE ORAL at 21:24

## 2022-06-09 RX ADMIN — CYANOCOBALAMIN TAB 1000 MCG 1000 MCG: 1000 TAB at 21:24

## 2022-06-09 RX ADMIN — FENOFIBRATE 160 MG: 160 TABLET ORAL at 08:49

## 2022-06-09 RX ADMIN — FUROSEMIDE 20 MG: 20 TABLET ORAL at 08:49

## 2022-06-09 RX ADMIN — CARVEDILOL 12.5 MG: 12.5 TABLET, FILM COATED ORAL at 21:23

## 2022-06-09 RX ADMIN — APIXABAN 5 MG: 5 TABLET, FILM COATED ORAL at 08:49

## 2022-06-09 RX ADMIN — BUSPIRONE HYDROCHLORIDE 5 MG: 5 TABLET ORAL at 08:49

## 2022-06-09 RX ADMIN — DOXYCYCLINE 100 MG: 100 CAPSULE ORAL at 08:49

## 2022-06-09 RX ADMIN — FERROUS SULFATE TAB 325 MG (65 MG ELEMENTAL FE) 325 MG: 325 (65 FE) TAB at 08:49

## 2022-06-09 RX ADMIN — APIXABAN 5 MG: 5 TABLET, FILM COATED ORAL at 21:23

## 2022-06-09 RX ADMIN — SODIUM CHLORIDE, PRESERVATIVE FREE 10 ML: 5 INJECTION INTRAVENOUS at 08:53

## 2022-06-09 RX ADMIN — BUSPIRONE HYDROCHLORIDE 5 MG: 5 TABLET ORAL at 21:24

## 2022-06-09 RX ADMIN — SODIUM CHLORIDE, PRESERVATIVE FREE 10 ML: 5 INJECTION INTRAVENOUS at 21:24

## 2022-06-09 RX ADMIN — CALCIUM CARBONATE 600 MG (1,500 MG)-VITAMIN D3 400 UNIT TABLET 1 TABLET: at 21:23

## 2022-06-09 RX ADMIN — PRAMIPEXOLE DIHYDROCHLORIDE 0.5 MG: 0.25 TABLET ORAL at 21:26

## 2022-06-09 RX ADMIN — AMLODIPINE BESYLATE 5 MG: 5 TABLET ORAL at 08:49

## 2022-06-09 RX ADMIN — FERROUS SULFATE TAB 325 MG (65 MG ELEMENTAL FE) 325 MG: 325 (65 FE) TAB at 21:26

## 2022-06-09 RX ADMIN — BUSPIRONE HYDROCHLORIDE 5 MG: 5 TABLET ORAL at 13:55

## 2022-06-09 RX ADMIN — GABAPENTIN 200 MG: 100 CAPSULE ORAL at 08:49

## 2022-06-09 RX ADMIN — CARVEDILOL 12.5 MG: 12.5 TABLET, FILM COATED ORAL at 08:49

## 2022-06-09 ASSESSMENT — PAIN DESCRIPTION - FREQUENCY: FREQUENCY: CONTINUOUS

## 2022-06-09 ASSESSMENT — PAIN DESCRIPTION - ORIENTATION
ORIENTATION: POSTERIOR
ORIENTATION: POSTERIOR

## 2022-06-09 ASSESSMENT — PAIN DESCRIPTION - PAIN TYPE: TYPE: ACUTE PAIN

## 2022-06-09 ASSESSMENT — PAIN DESCRIPTION - LOCATION
LOCATION: HEAD
LOCATION: HEAD

## 2022-06-09 ASSESSMENT — PAIN SCALES - GENERAL
PAINLEVEL_OUTOF10: 5
PAINLEVEL_OUTOF10: 2

## 2022-06-09 ASSESSMENT — PAIN - FUNCTIONAL ASSESSMENT: PAIN_FUNCTIONAL_ASSESSMENT: ACTIVITIES ARE NOT PREVENTED

## 2022-06-09 ASSESSMENT — PAIN DESCRIPTION - DESCRIPTORS: DESCRIPTORS: ACHING

## 2022-06-09 NOTE — PLAN OF CARE
Problem: Discharge Planning  Goal: Discharge to home or other facility with appropriate resources  6/9/2022 1927 by Domingo Fisher RN  Outcome: Progressing  6/9/2022 1907 by Domingo Fisher RN  Outcome: Progressing     Problem: Skin/Tissue Integrity  Goal: Absence of new skin breakdown  Description: 1. Monitor for areas of redness and/or skin breakdown  2. Assess vascular access sites hourly  3. Every 4-6 hours minimum:  Change oxygen saturation probe site  4. Every 4-6 hours:  If on nasal continuous positive airway pressure, respiratory therapy assess nares and determine need for appliance change or resting period. 6/9/2022 1927 by Domingo Fisher RN  Outcome: Progressing  Note: Assess the overall condition of the skin. Check on bony prominences such as the sacrum, trochanters, scapulae, elbows, heels, inner and outer malleolus, inner and outer knees, back of head). Reinforce the importance of turning, mobility, and ambulation. 6/9/2022 1907 by Domingo Fisher RN  Outcome: Progressing  Note: Assess the overall condition of the skin. Check on bony prominences such as the sacrum, trochanters, scapulae, elbows, heels, inner and outer malleolus, inner and outer knees, back of head). Reinforce the importance of turning, mobility, and ambulation. Problem: Safety - Adult  Goal: Free from fall injury  6/9/2022 1927 by Domingo Fisher RN  Outcome: Progressing  Note: Patient remains free of falls and injuries throughout shift. Bed remains in the lowest position, wheels locked, call light and bedside table are within reach. 6/9/2022 1907 by Domingo Fisher RN  Outcome: Progressing  Note: Patient remains free of falls and injuries throughout shift. Bed remains in the lowest position, wheels locked, call light and bedside table are within reach.       Problem: Chronic Conditions and Co-morbidities  Goal: Patient's chronic conditions and co-morbidity symptoms are monitored and maintained or improved  6/9/2022 1927 by Domingo Fisher RN  Outcome: Progressing  6/9/2022 1907 by Arminda Salmeron RN  Outcome: Progressing     Problem: ABCDS Injury Assessment  Goal: Absence of physical injury  6/9/2022 1927 by Arminda Salmeron RN  Outcome: Progressing  6/9/2022 1907 by Arminda Salmeron RN  Outcome: Progressing     Problem: Pain  Goal: Verbalizes/displays adequate comfort level or baseline comfort level  6/9/2022 1927 by Arminda Salmeron RN  Outcome: Progressing  6/9/2022 1907 by Arminda Salmeron RN  Outcome: Progressing     Problem: Skin/Tissue Integrity - Adult  Goal: Skin integrity remains intact  6/9/2022 1927 by Arminda Salmeron RN  Outcome: Progressing  6/9/2022 1907 by Arminda Salmeron RN  Outcome: Progressing     Problem: Musculoskeletal - Adult  Goal: Return ADL status to a safe level of function  6/9/2022 1927 by Arminda Salmeron RN  Outcome: Progressing  6/9/2022 1907 by Arminda Salmeron RN  Outcome: Progressing     Problem: Infection - Adult  Goal: Absence of infection during hospitalization  6/9/2022 1927 by Arminda Salmeron RN  Outcome: Progressing  6/9/2022 1907 by Arminda Salmeron RN  Outcome: Progressing

## 2022-06-09 NOTE — PROGRESS NOTES
)  Speed/Alicia: Pace decreased (< 100 feet/min); Shuffled; Slow  Step Length: Left lengthened;Right shortened  Stance: Left decreased  Distance (ft): 80 Feet  Assistive Device: Walker, rolling     PT Exercises  A/AROM Exercises: SEated B LE pre-gait ex's without resistance x20   Resistive Exercises: Seated B LE ex' s x15 with Orange theraband. Dynamic Standing Balance Exercises: Heel raises and marching x10 B LE with B UE support on RW. Goals  Short Term Goals  Time Frame for Short term goals: 5-7 days  Short term goal 1: Pt will tolerate one thirty minute therapeutic exercise session to show improvment in activity tolerance. Short term goal 2: Pt will improve one half MMT grade on L LE to show improvement in strength. Short term goal 3: Pt will be able to to half tandem stance with L LE posterior for 1-2 minutes to show ability to WB and maintain balance. Short term goal 4: Pt will be able to ambulate one 8\" step with no HR and least restrictive device and MinAx1 to be able to enter home. Short term goal 5: Pt will be able to ambulate 50-60ft with least restrictive device and CGAx1 to show improvement for distance walked. Additional Goals?: Yes  Short Term Goal 6: Pt will achieve a sit<>stand with CGAx1 with least restrictive device to show improvement in overall transfers. Patient Goals   Patient goals : to return home to spouse    Education  Patient Education  Education Given To: Patient  Education Provided: Energy Conservation;Transfer Training; Fall Prevention Strategies; Home Exercise Program  Education Method: Demonstration;Verbal;Teach Back  Barriers to Learning: None  Education Outcome: Verbalized understanding;Continued education needed     Safety measures utilized: Gait belt, Call light within reach, Sitting in chair and Chair alarm      Therapy Time   Individual Concurrent Group Co-treatment   Time In 1230         Time Out [address] [place], [place]

## 2022-06-09 NOTE — PLAN OF CARE
Problem: Discharge Planning  Goal: Discharge to home or other facility with appropriate resources  Outcome: Progressing     Problem: Skin/Tissue Integrity  Goal: Absence of new skin breakdown  Description: 1. Monitor for areas of redness and/or skin breakdown  2. Assess vascular access sites hourly  3. Every 4-6 hours minimum:  Change oxygen saturation probe site  4. Every 4-6 hours:  If on nasal continuous positive airway pressure, respiratory therapy assess nares and determine need for appliance change or resting period. Outcome: Progressing  Note: Assess the overall condition of the skin. Check on bony prominences such as the sacrum, trochanters, scapulae, elbows, heels, inner and outer malleolus, inner and outer knees, back of head). Reinforce the importance of turning, mobility, and ambulation. Problem: Safety - Adult  Goal: Free from fall injury  Outcome: Progressing  Note: Patient remains free of falls and injuries throughout shift. Bed remains in the lowest position, wheels locked, call light and bedside table are within reach.       Problem: Chronic Conditions and Co-morbidities  Goal: Patient's chronic conditions and co-morbidity symptoms are monitored and maintained or improved  Outcome: Progressing     Problem: ABCDS Injury Assessment  Goal: Absence of physical injury  Outcome: Progressing     Problem: Pain  Goal: Verbalizes/displays adequate comfort level or baseline comfort level  Outcome: Progressing     Problem: Skin/Tissue Integrity - Adult  Goal: Skin integrity remains intact  Outcome: Progressing     Problem: Musculoskeletal - Adult  Goal: Return ADL status to a safe level of function  Outcome: Progressing     Problem: Infection - Adult  Goal: Absence of infection during hospitalization  Outcome: Progressing

## 2022-06-09 NOTE — PROGRESS NOTES
Occupational Therapy  Facility/Department: [facility] MED SURG  Daily Treatment Note  NAME: Elver Lucio  : 1950  MRN: [id number]    Date of Service: 2022    Discharge Recommendations:  Patient would benefit from continued therapy after discharge  OT Equipment Recommendations  Other: TBD      Patient Diagnosis(es): The encounter diagnosis was Leg swelling. Assessment    Activity Tolerance: Patient tolerated treatment well  Discharge Recommendations: Patient would benefit from continued therapy after discharge  Other: TBD      Plan   PT Plan of Care:  (5-7 times per week)  Plan  Times per Week: 4-6  Current Treatment Recommendations: Self-Care / ADL; Strengthening;Balance training;Functional mobility training; Endurance training; Safety education & training;Patient/Caregiver education & training;Equipment evaluation, education, & procurement     Restrictions       Subjective   Subjective  Pain: Pt reported 5/10 \"headache,\" back of head  Orientation  Overall Orientation Status: Within Functional Limits  Cognition  Overall Cognitive Status: WFL        Objective    Vitals     Bed Mobility Training  Bed Mobility Training: No  Rolling: Stand-by assistance  Supine to Sit: Stand-by assistance  Scooting: Stand-by assistance (Increased time to complete- difficulty with LLE)  Balance  Sitting: With support  Standing: With support  Transfer Training  Transfer Training: Yes  Overall Level of Assistance: Contact-guard assistance (Good hand placement)  Interventions: Tactile cues (for finding arm of chair prior to sitting.)  Sit to Stand: Contact-guard assistance  Stand to Sit: Contact-guard assistance  Stand Pivot Transfers: Contact-guard assistance  Gait Training  Gait Training: Yes  Gait  Overall Level of Assistance: Contact-guard assistance  Interventions: Verbal cues; Safety awareness training; Tactile cues (VC for safe RW alignment. )  Speed/Alicia: Pace decreased (< 100 feet/min); Shuffled; Slow  Step Length: Left lengthened;Right shortened  Stance: Left decreased  Distance (ft): 80 Feet  Assistive Device: Walker, rolling     ADL  Feeding: Setup  Grooming: Setup  UE Bathing: Stand by assistance  LE Bathing: Moderate assistance  UE Dressing: Stand by assistance  LE Dressing: Moderate assistance  Toileting: Minimal assistance  Additional Comments: ADL scores based on clinical reasoning and skilled observation unless otherwise noted. Pt currently limited due to decreased strength, balance, activity tolerance impacting safety and independence with self care tasks. Pt brushed her teeth standing at sink, UE support as needed    OT Exercises  Exercise Treatment: OT facilitated pt's engagement in BUE exercises, using 1# weight 1 set x15 reps, to increase strength/endurance for improved functional use. Good tolerance overall. Min cues for pace with good carry over.             Patient Education  Education Given To: Patient  Education Provided: Role of Therapy;Plan of Care;Home Exercise Program;Transfer Training  Education Method: Verbal  Barriers to Learning: None  Education Outcome: Verbalized understanding;Continued education needed    Goals  Short Term Goals  Time Frame for Short term goals: by discharge  Short Term Goal 1: Pt will complete lower body dressing/bathing with SBA and Good safety with use of AE as needed  Short Term Goal 2: Pt will complete functional transfers/mobility during self care tasks with SBA and Good safety   Short Term Goal 3: Pt will verbalize/demonstrate Good understanding of home safety/fall prevention strategies to increse safety and independence with self care and mobility  Short Term Goal 4: Pt will participate in 15+ minutes of therapeutic exercises/functional activities to increase safety and independence with self care and mobility       Therapy Time   Individual Concurrent Group Co-treatment   Time In 0832         Time Out (P) 0902         Minutes (P) 30         Timed Code Treatment Minutes: (P) 30 Nora Rodriguez, OT

## 2022-06-09 NOTE — CARE COORDINATION
AMELIA received a call from [facility] informing SW that pt was denied by insurance. There is opportunity for P2P to be held. P2P must be completed by tomorrow at [facility] by calling [phone]. AMELIA discussed with Wilbert CORRIGAN and pt is SNF level appropriate. AMELIA discussed this with pt and pt agreeable to SNF. Both pt's backup choices, [address] of [address] are out of network with [email or web address]. SW provided pt with list of in network facilities ([facility], [facility], [facility], [facility] steve Castillo). Pt would be [facility]. SW sent referral to [facility] and they will review.

## 2022-06-09 NOTE — CARE COORDINATION
[facility] Pre-cert Still pending .     Electronically signed by [facility] on 6/9/22 at 12:04 PM EDT

## 2022-06-10 LAB
HCT VFR BLD CALC: 33 % (ref 36–46)
HEMOGLOBIN: 11.1 G/DL (ref 12–16)
MCH RBC QN AUTO: 31.9 PG (ref 26–34)
MCHC RBC AUTO-ENTMCNC: 33.6 G/DL (ref 31–37)
MCV RBC AUTO: 94.9 FL (ref 80–100)
PDW BLD-RTO: 13.7 % (ref 11.5–14.9)
PLATELET # BLD: 353 K/UL (ref 150–450)
PMV BLD AUTO: 6.8 FL (ref 6–12)
RBC # BLD: 3.48 M/UL (ref 4–5.2)
WBC # BLD: 5.7 K/UL (ref 3.5–11)

## 2022-06-10 PROCEDURE — 1200000000 HC SEMI PRIVATE

## 2022-06-10 PROCEDURE — 6370000000 HC RX 637 (ALT 250 FOR IP): Performed by: STUDENT IN AN ORGANIZED HEALTH CARE EDUCATION/TRAINING PROGRAM

## 2022-06-10 PROCEDURE — 36415 COLL VENOUS BLD VENIPUNCTURE: CPT

## 2022-06-10 PROCEDURE — 97116 GAIT TRAINING THERAPY: CPT

## 2022-06-10 PROCEDURE — 97110 THERAPEUTIC EXERCISES: CPT

## 2022-06-10 PROCEDURE — 85027 COMPLETE CBC AUTOMATED: CPT

## 2022-06-10 PROCEDURE — 2580000003 HC RX 258: Performed by: NURSE PRACTITIONER

## 2022-06-10 PROCEDURE — 6370000000 HC RX 637 (ALT 250 FOR IP): Performed by: NURSE PRACTITIONER

## 2022-06-10 RX ADMIN — BUSPIRONE HYDROCHLORIDE 5 MG: 5 TABLET ORAL at 21:45

## 2022-06-10 RX ADMIN — TRAZODONE HYDROCHLORIDE 50 MG: 50 TABLET ORAL at 21:45

## 2022-06-10 RX ADMIN — FERROUS SULFATE TAB 325 MG (65 MG ELEMENTAL FE) 325 MG: 325 (65 FE) TAB at 08:41

## 2022-06-10 RX ADMIN — ATORVASTATIN CALCIUM 40 MG: 40 TABLET, FILM COATED ORAL at 21:45

## 2022-06-10 RX ADMIN — CITALOPRAM HYDROBROMIDE 20 MG: 20 TABLET ORAL at 08:41

## 2022-06-10 RX ADMIN — CALCIUM CARBONATE 600 MG (1,500 MG)-VITAMIN D3 400 UNIT TABLET 1 TABLET: at 21:45

## 2022-06-10 RX ADMIN — GABAPENTIN 200 MG: 100 CAPSULE ORAL at 08:41

## 2022-06-10 RX ADMIN — BUSPIRONE HYDROCHLORIDE 5 MG: 5 TABLET ORAL at 15:07

## 2022-06-10 RX ADMIN — CARVEDILOL 12.5 MG: 12.5 TABLET, FILM COATED ORAL at 08:41

## 2022-06-10 RX ADMIN — APIXABAN 5 MG: 5 TABLET, FILM COATED ORAL at 08:41

## 2022-06-10 RX ADMIN — GABAPENTIN 200 MG: 100 CAPSULE ORAL at 21:45

## 2022-06-10 RX ADMIN — AMLODIPINE BESYLATE 5 MG: 5 TABLET ORAL at 08:41

## 2022-06-10 RX ADMIN — ACETAMINOPHEN 650 MG: 325 TABLET ORAL at 07:06

## 2022-06-10 RX ADMIN — DOXYCYCLINE 100 MG: 100 CAPSULE ORAL at 08:41

## 2022-06-10 RX ADMIN — FERROUS SULFATE TAB 325 MG (65 MG ELEMENTAL FE) 325 MG: 325 (65 FE) TAB at 21:45

## 2022-06-10 RX ADMIN — APIXABAN 5 MG: 5 TABLET, FILM COATED ORAL at 21:44

## 2022-06-10 RX ADMIN — FENOFIBRATE 160 MG: 160 TABLET ORAL at 08:41

## 2022-06-10 RX ADMIN — SODIUM CHLORIDE, PRESERVATIVE FREE 10 ML: 5 INJECTION INTRAVENOUS at 21:45

## 2022-06-10 RX ADMIN — BUSPIRONE HYDROCHLORIDE 5 MG: 5 TABLET ORAL at 08:41

## 2022-06-10 RX ADMIN — SODIUM CHLORIDE, PRESERVATIVE FREE 10 ML: 5 INJECTION INTRAVENOUS at 08:41

## 2022-06-10 RX ADMIN — FUROSEMIDE 20 MG: 20 TABLET ORAL at 08:41

## 2022-06-10 RX ADMIN — DOXYCYCLINE 100 MG: 100 CAPSULE ORAL at 21:45

## 2022-06-10 RX ADMIN — CARVEDILOL 12.5 MG: 12.5 TABLET, FILM COATED ORAL at 21:45

## 2022-06-10 RX ADMIN — CYANOCOBALAMIN TAB 1000 MCG 1000 MCG: 1000 TAB at 21:45

## 2022-06-10 RX ADMIN — PRAMIPEXOLE DIHYDROCHLORIDE 0.5 MG: 0.25 TABLET ORAL at 21:44

## 2022-06-10 ASSESSMENT — PAIN DESCRIPTION - ORIENTATION: ORIENTATION: MID

## 2022-06-10 ASSESSMENT — PAIN DESCRIPTION - DESCRIPTORS: DESCRIPTORS: ACHING

## 2022-06-10 ASSESSMENT — PAIN DESCRIPTION - LOCATION: LOCATION: HEAD

## 2022-06-10 ASSESSMENT — PAIN SCALES - GENERAL: PAINLEVEL_OUTOF10: 5

## 2022-06-10 NOTE — PROGRESS NOTES
Physical Therapy  Facility/Department: [facility] MED SURG  Daily Treatment Note  NAME: Jett Joshi  : 1950  MRN: [id number]    Date of Service: 6/10/2022    Discharge Recommendations:  Patient would benefit from continued therapy after discharge,Therapy recommended at discharge   PT Equipment Recommendations  Equipment Needed: No (pt reports having a rollator)    Patient Diagnosis(es): The encounter diagnosis was Leg swelling. Assessment   Activity Tolerance: Patient tolerated treatment well  Equipment Needed: No (pt reports having a rollator)     Plan    Plan  Plan: 5-7 times per week  Specific Instructions for Next Treatment: improve strength, progress in distance ambulating, progress to 8\"step, improve dynamic balance and overall gait. Recommend using RW next visit. Current Treatment Recommendations: Strengthening;Balance training;Functional mobility training;Transfer training; Endurance training;Stair training;Gait training; Safety education & training; Therapeutic activities     Restrictions  Restrictions/Precautions  Restrictions/Precautions: Fall Risk,General Precautions  Required Braces or Orthoses?: No  Implants present? : Metal implants (stu in back, stu in wrist , neck has screws)  Position Activity Restriction  Other position/activity restrictions: up with assist     Subjective    Subjective  Subjective: Pt seated in bedside chair upon writers arrival. Pt is agreeable and cooperative with therapy  Pain: Denies  Orientation  Overall Orientation Status: Within Functional Limits  Cognition  Overall Cognitive Status: WFL     Objective   Vitals     Bed Mobility Training  Bed Mobility Training: No  Balance  Sitting: With support  Standing: With support  Transfer Training  Transfer Training: Yes  Overall Level of Assistance: Contact-guard assistance  Interventions: Tactile cues (guiding pts hand to armrest to sit)  Sit to Stand: Contact-guard assistance  Stand to Sit: Contact-guard assistance  Stand Pivot Transfers: Contact-guard assistance  Gait Training  Gait Training: Yes  Gait  Overall Level of Assistance: Contact-guard assistance  Interventions: Verbal cues; Safety awareness training (To keep RW within KAREEM)  Speed/Alicia: Pace decreased (< 100 feet/min)  Step Length: Left lengthened;Right shortened  Stance: Left decreased;Right increased  Gait Abnormalities: Antalgic;Decreased step clearance; Path deviations  Distance (ft): 77 Feet  Assistive Device: Walker, rolling     PT Exercises  Resistive Exercises: Seated B LE ex' s x15 AROM and with Orange theraband. Goals  Short Term Goals  Time Frame for Short term goals: 5-7 days  Short term goal 1: Pt will tolerate one thirty minute therapeutic exercise session to show improvment in activity tolerance. Short term goal 2: Pt will improve one half MMT grade on L LE to show improvement in strength. Short term goal 3: Pt will be able to to half tandem stance with L LE posterior for 1-2 minutes to show ability to WB and maintain balance. Short term goal 4: Pt will be able to ambulate one 8\" step with no HR and least restrictive device and MinAx1 to be able to enter home. Short term goal 5: Pt will be able to ambulate 50-60ft with least restrictive device and CGAx1 to show improvement for distance walked. Additional Goals?: Yes  Short Term Goal 6: Pt will achieve a sit<>stand with CGAx1 with least restrictive device to show improvement in overall transfers.   Patient Goals   Patient goals : to return home to spouse    Education       Therapy Time   Individual Concurrent Group Co-treatment   Time In 1142         Time Out 1206         Minutes [address] [place] [address]

## 2022-06-10 NOTE — PLAN OF CARE
Problem: Discharge Planning  Goal: Discharge to home or other facility with appropriate resources  Outcome: Progressing  Note: Plan is for patient to discharge to SNF for rehab. Problem: Skin/Tissue Integrity  Goal: Absence of new skin breakdown  Description: 1. Monitor for areas of redness and/or skin breakdown  2. Assess vascular access sites hourly  3. Every 4-6 hours minimum:  Change oxygen saturation probe site  4. Every 4-6 hours:  If on nasal continuous positive airway pressure, respiratory therapy assess nares and determine need for appliance change or resting period. Outcome: Progressing     Problem: Safety - Adult  Goal: Free from fall injury  Outcome: Progressing  Note: Patient is alert and oriented and able to make needs known.      Problem: Chronic Conditions and Co-morbidities  Goal: Patient's chronic conditions and co-morbidity symptoms are monitored and maintained or improved  Outcome: Progressing     Problem: ABCDS Injury Assessment  Goal: Absence of physical injury  Outcome: Progressing     Problem: Pain  Goal: Verbalizes/displays adequate comfort level or baseline comfort level  Outcome: Progressing     Problem: Skin/Tissue Integrity - Adult  Goal: Skin integrity remains intact  Outcome: Progressing     Problem: Musculoskeletal - Adult  Goal: Return ADL status to a safe level of function  Outcome: Progressing     Problem: Infection - Adult  Goal: Absence of infection during hospitalization  Outcome: Progressing

## 2022-06-10 NOTE — CARE COORDINATION
[facility] contacted  to inform them that the peer to peer was denied.  spoke with Terence from [employer]. [address] worker spoke with spouse Arik Molina. Arik Molina reports that patient is going to un-enrolll in their current insurance plans. Spouse reported that patient wants to be on straight Medicare. Spouse reports that he contacted [facility] to inform that they are changing insurance plans. Spouse reported that he does not want  to expedite appeal the denial of peer to peer. Spouse reports that the patient wants to change her insurance plan. Allegedly, the insurance switch will be today.  will continue to follow for discharge plans. Electronically signed by Елена Felix on 6/10/2022 at 12:07 PM      Social work spoke with patient. Patient is agreeable to go to [facility].  spoke with Terence from [employer]. Terence reported that pre-cert will be started 6/10.  will continue to follow for discharge needs.      Electronically signed by Елена Felix on 6/10/2022 at 12:56 PM

## 2022-06-11 LAB
HCT VFR BLD CALC: 32.7 % (ref 36–46)
HEMOGLOBIN: 10.9 G/DL (ref 12–16)
MCH RBC QN AUTO: 31.8 PG (ref 26–34)
MCHC RBC AUTO-ENTMCNC: 33.4 G/DL (ref 31–37)
MCV RBC AUTO: 95.4 FL (ref 80–100)
PDW BLD-RTO: 14 % (ref 11.5–14.9)
PLATELET # BLD: 346 K/UL (ref 150–450)
PMV BLD AUTO: 6.9 FL (ref 6–12)
RBC # BLD: 3.43 M/UL (ref 4–5.2)
WBC # BLD: 6 K/UL (ref 3.5–11)

## 2022-06-11 PROCEDURE — 2580000003 HC RX 258: Performed by: NURSE PRACTITIONER

## 2022-06-11 PROCEDURE — 85027 COMPLETE CBC AUTOMATED: CPT

## 2022-06-11 PROCEDURE — 99232 SBSQ HOSP IP/OBS MODERATE 35: CPT | Performed by: INTERNAL MEDICINE

## 2022-06-11 PROCEDURE — 36415 COLL VENOUS BLD VENIPUNCTURE: CPT

## 2022-06-11 PROCEDURE — 6370000000 HC RX 637 (ALT 250 FOR IP): Performed by: STUDENT IN AN ORGANIZED HEALTH CARE EDUCATION/TRAINING PROGRAM

## 2022-06-11 PROCEDURE — 6370000000 HC RX 637 (ALT 250 FOR IP): Performed by: NURSE PRACTITIONER

## 2022-06-11 PROCEDURE — 1200000000 HC SEMI PRIVATE

## 2022-06-11 RX ADMIN — BUSPIRONE HYDROCHLORIDE 5 MG: 5 TABLET ORAL at 08:57

## 2022-06-11 RX ADMIN — CARVEDILOL 12.5 MG: 12.5 TABLET, FILM COATED ORAL at 08:57

## 2022-06-11 RX ADMIN — GABAPENTIN 200 MG: 100 CAPSULE ORAL at 20:14

## 2022-06-11 RX ADMIN — ATORVASTATIN CALCIUM 40 MG: 40 TABLET, FILM COATED ORAL at 20:14

## 2022-06-11 RX ADMIN — CITALOPRAM HYDROBROMIDE 20 MG: 20 TABLET ORAL at 08:57

## 2022-06-11 RX ADMIN — GABAPENTIN 200 MG: 100 CAPSULE ORAL at 08:58

## 2022-06-11 RX ADMIN — BUSPIRONE HYDROCHLORIDE 5 MG: 5 TABLET ORAL at 13:25

## 2022-06-11 RX ADMIN — AMLODIPINE BESYLATE 5 MG: 5 TABLET ORAL at 08:57

## 2022-06-11 RX ADMIN — SODIUM CHLORIDE, PRESERVATIVE FREE 10 ML: 5 INJECTION INTRAVENOUS at 09:02

## 2022-06-11 RX ADMIN — CARVEDILOL 12.5 MG: 12.5 TABLET, FILM COATED ORAL at 20:14

## 2022-06-11 RX ADMIN — APIXABAN 5 MG: 5 TABLET, FILM COATED ORAL at 20:15

## 2022-06-11 RX ADMIN — APIXABAN 5 MG: 5 TABLET, FILM COATED ORAL at 08:57

## 2022-06-11 RX ADMIN — PRAMIPEXOLE DIHYDROCHLORIDE 0.5 MG: 0.25 TABLET ORAL at 20:16

## 2022-06-11 RX ADMIN — DOXYCYCLINE 100 MG: 100 CAPSULE ORAL at 20:15

## 2022-06-11 RX ADMIN — FERROUS SULFATE TAB 325 MG (65 MG ELEMENTAL FE) 325 MG: 325 (65 FE) TAB at 08:57

## 2022-06-11 RX ADMIN — Medication 6 MG: at 20:15

## 2022-06-11 RX ADMIN — ACETAMINOPHEN 650 MG: 325 TABLET ORAL at 20:15

## 2022-06-11 RX ADMIN — BUSPIRONE HYDROCHLORIDE 5 MG: 5 TABLET ORAL at 20:15

## 2022-06-11 RX ADMIN — FENOFIBRATE 160 MG: 160 TABLET ORAL at 08:57

## 2022-06-11 RX ADMIN — DOXYCYCLINE 100 MG: 100 CAPSULE ORAL at 08:57

## 2022-06-11 RX ADMIN — CYANOCOBALAMIN TAB 1000 MCG 1000 MCG: 1000 TAB at 20:15

## 2022-06-11 RX ADMIN — FUROSEMIDE 20 MG: 20 TABLET ORAL at 08:57

## 2022-06-11 RX ADMIN — CALCIUM CARBONATE 600 MG (1,500 MG)-VITAMIN D3 400 UNIT TABLET 1 TABLET: at 20:14

## 2022-06-11 RX ADMIN — FERROUS SULFATE TAB 325 MG (65 MG ELEMENTAL FE) 325 MG: 325 (65 FE) TAB at 20:15

## 2022-06-11 RX ADMIN — ACETAMINOPHEN 650 MG: 325 TABLET ORAL at 08:59

## 2022-06-11 ASSESSMENT — PAIN DESCRIPTION - LOCATION: LOCATION: HEAD;NECK

## 2022-06-11 ASSESSMENT — PAIN SCALES - GENERAL
PAINLEVEL_OUTOF10: 3
PAINLEVEL_OUTOF10: 3

## 2022-06-11 NOTE — PROGRESS NOTES
Removed peripheral IV due to pain/redness at site reported by patient. MD Rodolfo Durham notified and okay with patient not having IV access at this time.

## 2022-06-11 NOTE — PROGRESS NOTES
[place] [address] Internal Medicine    Progress Note     6/11/2022    11:10 AM    Name:   Dionte Rao  MRN:     [id number]     Acct:      [de-identified]   Room:   2064/2064-01   Day:  6  Admit Date:  6/5/2022  1:47 PM    PCP:   Opal Weinstein MD  Code Status:  Full Code    Subjective:     C/C:   Chief Complaint   Patient presents with    Leg Swelling     Principal Problem:    Cellulitis of both lower extremities  Active Problems:    Stage 3 chronic kidney disease ([facility] [address]    Type 2 diabetes mellitus with circulatory disorder, without long-term current use of insulin ([facility] [address]  Resolved Problems:    * No resolved hospital problems. *      6/11/22  No acute issues overnight. Patient working with PT/OT  Waiting for pre-CERT. Discharge planning in process             Significant last 24 hr data reviewed ;   Vitals:    06/10/22 1212 06/10/22 1853 06/11/22 0542 06/11/22 0638   BP: 122/73 124/61  (!) 140/76   Pulse: 73 66  70   Resp: 16 18  18   Temp: 97.9 °F (36.6 °C) 97.7 °F (36.5 °C)  98.4 °F (36.9 °C)   TempSrc:    Oral   SpO2: 95% 96%  94%   Weight:   185 lb 10 oz (84.2 kg)    Height:          Recent Results (from the past 24 hour(s))   CBC    Collection Time: 06/11/22  5:42 AM   Result Value Ref Range    WBC 6.0 3.5 - 11.0 k/uL    RBC 3.43 (L) 4.0 - 5.2 m/uL    Hemoglobin 10.9 (L) 12.0 - 16.0 g/dL    Hematocrit 32.7 (L) 36 - 46 %    MCV 95.4 80 - 100 fL    MCH 31.8 26 - 34 pg    MCHC 33.4 31 - 37 g/dL    RDW 14.0 11.5 - 14.9 %    Platelets 421 354 - 658 k/uL    MPV 6.9 6.0 - 12.0 fL     No results for input(s): POCGLU in the last 72 hours.    CT Head WO Contrast    Result Date: 6/5/2022  EXAMINATION: CT OF THE HEAD WITHOUT CONTRAST  6/5/2022 2:31 pm TECHNIQUE: CT of the head was performed without the administration of intravenous contrast. Automated exposure control, iterative reconstruction, and/or weight based adjustment of the mA/kV was utilized to reduce the radiation dose to as low as reasonably achievable. COMPARISON: None. HISTORY: ORDERING SYSTEM PROVIDED HISTORY: fall, head injury TECHNOLOGIST PROVIDED HISTORY: fall, head injury Decision Support Exception - unselect if not a suspected or confirmed emergency medical condition->Emergency Medical Condition (MA) Reason for Exam: fall, head injury Additional signs and symptoms: Pt states fall with head injury a few days ago FINDINGS: BRAIN/VENTRICLES: There is no acute intracranial hemorrhage, mass effect or midline shift. No abnormal extra-axial fluid collection. The gray-white differentiation is maintained without evidence of an acute infarct. There is no evidence of hydrocephalus. ORBITS: The visualized portion of the orbits demonstrate no acute abnormality. SINUSES: The visualized paranasal sinuses and mastoid air cells demonstrate no acute abnormality. 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 SOFT TISSUES/SKULL:  No acute abnormality of the visualized skull or soft tissues. No acute intracranial abnormality. CT Cervical Spine WO Contrast    Result Date: 6/5/2022  EXAMINATION: CT OF THE CERVICAL SPINE WITHOUT CONTRAST 6/5/2022 11:33 am TECHNIQUE: CT of the cervical spine was performed without the administration of intravenous contrast. Multiplanar reformatted images are provided for review. Automated exposure control, iterative reconstruction, and/or weight based adjustment of the mA/kV was utilized to reduce the radiation dose to as low as reasonably achievable.  COMPARISON: 03/16/2022 HISTORY: ORDERING SYSTEM PROVIDED HISTORY: fall, neck pain TECHNOLOGIST PROVIDED HISTORY: fall, neck pain Decision Support Exception - unselect if not a suspected or confirmed emergency medical condition->Emergency Medical Condition (MA) Reason for Exam: fall, head injury Additional signs and symptoms: Pt states fall a few days ago and complains of neck pain FINDINGS: BONES/ALIGNMENT: There is stable D7-X4 posterior metallic fusion with associated laminectomies at this level. No evidence of instrumentation loosening or failure. Stable reversal of the normal cervical lordosis. No fracture or osseous destructive lesion. DEGENERATIVE CHANGES: Multilevel degenerative disc disease is noted in the cervical spine which is mild in severity. This is greatest at C6-C7. SOFT TISSUES: Vascular calcifications are noted along the aorta. The lung apices are clear. The thyroid gland is heterogeneous. There is a right-sided thyroid nodule that is low in attenuation measuring 4 mm, benign. No follow-up imaging is required. Vascular calcifications are noted in the carotid bulbs. No acute osseous abnormality of the cervical spine. No significant change from prior exam     XR CHEST PORTABLE    Result Date: 6/5/2022  EXAMINATION: ONE XRAY VIEW OF THE CHEST 6/5/2022 2:15 pm COMPARISON: April 8, 2022 HISTORY: ORDERING SYSTEM PROVIDED HISTORY: shortness of breath TECHNOLOGIST PROVIDED HISTORY: shortness of breath Reason for Exam: Shortness of breath FINDINGS: The lungs are without acute focal process. There is no effusion or pneumothorax. The cardiomediastinal silhouette is without acute process. The osseous structures are without acute process. No acute process.      VL Lower Extremity Bilateral Venous Duplex    Result Date: 6/5/2022    [facility]  Vascular Lower Extremities DVT Study Procedure   Patient Name   Xochitl Ramesh       Date of Study           06/05/2022                 Joseph Denton   Date of Birth  1950  Gender                  Female   Age            71 year(s)  Race                       Room Number    SANDIP   Corporate ID # [id number]   Patient Acct # [de-identified]   MR #           [id number]      Sonographer             Jordan Carnes   Accession #    [id number]  Interpreting Physician  Harvinder Heart   Referring                  Referring Physician     Lexie Murphy  Nurse  Practitioner Procedure Type of Study:   Veins: Lower Extremities DVT Study, Venous Scan Lower Bilateral.  Indications for Study:Leg Swelling. Patient Status:ER. Technical Quality:Limited visualization. Limitation reason:swelling. Comments: Indications: leg swelling, wells score of 4. Patient indicates she is on Eliquis twice a day and has a history of cellulitis. Conclusions   Summary   No evidence of superficial or deep venous thrombosis in both lower  extremities. Signature   ----------------------------------------------------------------  Electronically signed by Jordan Carnes(Sonographer) on  06/05/2022 08:33 PM  ----------------------------------------------------------------   ----------------------------------------------------------------  Electronically signed by Madlyn Kindle Reyes,Arthur(Interpreting  physician) on 06/05/2022 09:59 PM  ----------------------------------------------------------------  Findings:   Right Impression:                    Left Impression:   The common femoral, proximal         The common femoral, proximal femoral,  femoral, and popliteal veins         and popliteal veins demonstrate  demonstrate normal compressibility. normal compressibility. Normal compressibility of the great  Normal compressibility of the great  saphenous vein. saphenous vein. Normal compressibility of the small  Normal compressibility of the small  saphenous vein. saphenous vein. Velocities are measured in cm/s ; Diameters are measured in cm Right Lower Extremities DVT Study Measurements Right 2D Measurements +------------------------------------+----------+---------------+----------+ ! Location                            ! Visualized! Compressibility! Thrombosis! +------------------------------------+----------+---------------+----------+ ! Common Femoral                      !Yes       ! Yes            ! None      ! +------------------------------------+----------+---------------+----------+ ! Prox Femoral                        !Yes       ! Yes            ! None      ! +------------------------------------+----------+---------------+----------+ ! Mid Femoral                         !No        !               !          ! +------------------------------------+----------+---------------+----------+ ! Dist Femoral                        !No        !               !          ! +------------------------------------+----------+---------------+----------+ ! Deep Femoral                        !No        !               !          ! +------------------------------------+----------+---------------+----------+ ! Popliteal                           !Yes       ! Yes            ! None      ! +------------------------------------+----------+---------------+----------+ ! Sapheno Femoral Junction            ! Yes       ! Yes            ! None      ! +------------------------------------+----------+---------------+----------+ ! PTV                                 ! Partial   !Yes            ! None      ! +------------------------------------+----------+---------------+----------+ ! Peroneal                            !No        !               !          ! +------------------------------------+----------+---------------+----------+ ! Gastroc                             ! Partial   !Yes            ! None      ! +------------------------------------+----------+---------------+----------+ ! GSV Thigh                           ! Yes       ! Yes            ! None      ! +------------------------------------+----------+---------------+----------+ ! GSV Knee                            ! Yes       ! Yes            ! None      ! +------------------------------------+----------+---------------+----------+ ! GSV Ankle                           ! Yes       ! Yes            ! None      ! +------------------------------------+----------+---------------+----------+ ! SSV !Yes       !Yes            ! None      ! +------------------------------------+----------+---------------+----------+ Right Doppler Measurements +---------------------------+------+------+--------------------------------+ ! Location                   ! Signal!Reflux! Reflux (msec)                   ! +---------------------------+------+------+--------------------------------+ ! Common Femoral             !Phasic!      !                                ! +---------------------------+------+------+--------------------------------+ ! Prox Femoral               !Phasic!      !                                ! +---------------------------+------+------+--------------------------------+ ! Popliteal                  !Phasic!      !                                ! +---------------------------+------+------+--------------------------------+ Left Lower Extremities DVT Study Measurements Left 2D Measurements +------------------------------------+----------+---------------+----------+ ! Location                            ! Visualized! Compressibility! Thrombosis! +------------------------------------+----------+---------------+----------+ ! Common Femoral                      !Yes       ! Yes            ! None      ! +------------------------------------+----------+---------------+----------+ ! Prox Femoral                        !Yes       ! Yes            ! None      ! +------------------------------------+----------+---------------+----------+ ! Mid Femoral                         !No        !               !          ! +------------------------------------+----------+---------------+----------+ ! Dist Femoral                        !No        !               !          ! +------------------------------------+----------+---------------+----------+ ! Deep Femoral                        !No        !               !          ! +------------------------------------+----------+---------------+----------+ ! Popliteal !Yes       !Yes            ! None      ! +------------------------------------+----------+---------------+----------+ ! Sapheno Femoral Junction            ! Yes       ! Yes            ! None      ! +------------------------------------+----------+---------------+----------+ ! PTV                                 ! Partial   !Yes            ! None      ! +------------------------------------+----------+---------------+----------+ ! Peroneal                            !No        !               !          ! +------------------------------------+----------+---------------+----------+ ! Gastroc                             ! Partial   !Yes            ! None      ! +------------------------------------+----------+---------------+----------+ ! GSV Thigh                           ! Yes       ! Yes            ! None      ! +------------------------------------+----------+---------------+----------+ ! GSV Knee                            ! Yes       ! Yes            ! None      ! +------------------------------------+----------+---------------+----------+ ! GSV Ankle                           ! Yes       ! Yes            ! None      ! +------------------------------------+----------+---------------+----------+ ! SSV                                 ! Yes       ! Yes            ! None      ! +------------------------------------+----------+---------------+----------+ Left Doppler Measurements +---------------------------+------+------+--------------------------------+ ! Location                   ! Signal!Reflux! Reflux (msec)                   ! +---------------------------+------+------+--------------------------------+ ! Common Femoral             !Phasic! No    !                                ! +---------------------------+------+------+--------------------------------+ ! Prox Femoral               !Phasic!      !                                ! +---------------------------+------+------+--------------------------------+ ! Popliteal !Phasic!      !                                ! +---------------------------+------+------+--------------------------------+          On admission     The patient is a 71 y.o.  female, with a history of anemia, spondylolisthesis, DVT, chronic diastolic heart failure, CVA, C. difficile, HTN, CKD stage III, peptic ulcer disease, and DM type II, who presents with leg swelling. According to patient and her , legs have been increasingly edematous with progressive erythema over the past 2 weeks. Patient was admitted to this facility in March for frequent falls and found to have DVT. Patient reports that she was concerned for recurrent DVT, despite being compliant with Eliquis. Following inpatient treatment, patient received intensive therapy and acute rehab department but reports having 5 falls since that time. Patient has a history of left sided weakness from old CVA.  thinks falls are due to poor technique with transfers d/t fear of falling, plus edema makes it difficult to lift legs to walk. Symptoms are associated with painful hematoma on posterior scalp from recent fall. Patient also reports dry non-productive cough, which is residual from recent bronchitis. Denies fever, chills, chest pain, abdominal pain, nausea, vomiting, diarrhea, and urinary symptoms. There are no aggravating or alleviating factors. Symptoms are reported as constant and moderate to severe; progressively worsening. Review of Systems:     Constitutional: Negative for chills, diaphoresis and fever. HENT: Negative for congestion and sore throat. Respiratory: Positive for cough (dry, nonproductive). Negative for shortness of breath and wheezing. Cardiovascular: Positive for leg swelling (reports weeping edema). Negative for chest pain and palpitations. Gastrointestinal: Negative for abdominal pain, constipation, diarrhea, nausea and vomiting.    Genitourinary: Negative for dysuria, frequency and urgency. Musculoskeletal: Negative for back pain and myalgias. Skin: Positive for color change (erythema, BLE). Negative for rash. Neurological: Positive for weakness (generalized weakness) and headaches (pain on posterior scalp from recent fall. ). Negative for dizziness. Psychiatric/Behavioral: The patient is not nervous/anxious. Data:     Past Medical History: No change     Social History: No change    Family History: @no change    Vitals:  Reviewed    I/O (24Hr): No intake or output data in the 24 hours ending 22 1110  Labs:  CBC from today. Creatine from today. Radiology:  N/A    Medications:     Current Meds:   Scheduled Meds:    doxycycline monohydrate  100 mg Oral 2 times per day    furosemide  20 mg Oral Daily    amLODIPine  5 mg Oral Daily    apixaban  5 mg Oral BID    atorvastatin  40 mg Oral Nightly    busPIRone  5 mg Oral TID    calcium carb-cholecalciferol  1 tablet Oral Nightly    carvedilol  12.5 mg Oral BID    citalopram  20 mg Oral Daily    cyanocobalamin  1,000 mcg Oral Nightly    fenofibrate  160 mg Oral Daily    ferrous sulfate  325 mg Oral BID    gabapentin  200 mg Oral BID    pramipexole  0.5 mg Oral Nightly    sodium chloride flush  5-40 mL IntraVENous 2 times per day     Continuous Infusions:    sodium chloride 20 mL/hr at 22 2322     PRN Meds: traZODone, melatonin, sodium chloride flush, sodium chloride, potassium chloride **OR** potassium alternative oral replacement **OR** potassium chloride, magnesium sulfate, ondansetron **OR** ondansetron, polyethylene glycol, acetaminophen **OR** acetaminophen    Physical Examination:        BP (!) 140/76   Pulse 70   Temp 98.4 °F (36.9 °C) (Oral)   Resp 18   Ht 5' 3\" (1.6 m)   Wt 185 lb 10 oz (84.2 kg)   SpO2 94%   BMI 32.88 kg/m²   Temp (24hrs), Av °F (36.7 °C), Min:97.7 °F (36.5 °C), Max:98.4 °F (36.9 °C)    No results for input(s): POCGLU in the last 72 hours.   No intake or output data in the 24 hours ending 06/11/22 1110  Constitutional:       General: She is not in acute distress. Appearance: She is well-developed. She is not diaphoretic. HENT:      Head: Normocephalic and atraumatic. Eyes:      Conjunctiva/sclera: Conjunctivae normal.      Pupils: Pupils are equal, round, and reactive to light. Neck:      Trachea: No tracheal deviation. Cardiovascular:      Rate and Rhythm: Normal rate and regular rhythm. Heart sounds: Normal heart sounds. No murmur heard. No friction rub. No gallop. Pulmonary:      Effort: Pulmonary effort is normal. No respiratory distress. Breath sounds: Normal breath sounds. No wheezing or rales. Chest:      Chest wall: No tenderness. Abdominal:      General: Bowel sounds are normal. There is no distension. Palpations: Abdomen is soft. Tenderness: There is no abdominal tenderness. There is no guarding. Musculoskeletal:         General: No tenderness. Normal range of motion. Cervical back: Normal range of motion and neck supple. Right lower leg: Edema (1+) present. Left lower leg: Edema (1+) present. Comments: Patient with 1+ pitting edema bilateral lower extremities that extends up your groin. Bilateral lower legs are mildly erythematous with increased warmth. There are multiple small scabbed areas on bilateral shins. Lymphadenopathy:      Cervical: No cervical adenopathy. Skin:     General: Skin is warm and dry. Coloration: Skin is not pale. Findings: Erythema (BLE) present. No rash. Neurological:      Mental Status: She is alert and oriented to person, place, and time. Motor: No seizure activity. Coordination: Coordination normal.   Psychiatric:         Behavior: Behavior normal.         Thought Content:  Thought content normal.     No lower extremity calf tenderness bilaterally   Redness/Induration of anterior legs bilaterally reduced     Assessment:        Primary Problem  Cellulitis of both lower extremities    Principal Problem:    Cellulitis of both lower extremities  Active Problems:    Stage 3 chronic kidney disease (HCC)    Type 2 diabetes mellitus with circulatory disorder, without long-term current use of insulin (HCC)  Resolved Problems:    * No resolved hospital problems. *     Plan:                 6/11/22  No acute issues overnight. Patient working with PT/OT  Waiting for pre-CERT. Discharge planning in process             Walteramanda Robertson MD  PGY-3 Internal Medicine Resident  [address] [place] [address]  6/11/2022 11:10 AM               Attestation and add on       I have discussed the care of Lyudmila Finnegan , including pertinent history and exam findings,      6/11/22    with the resident. I have seen and examined the patient and the key elements of all parts of the encounter have been performed by me . I agree with the assessment, plan and orders as documented by the resident. Hospital Problems           Last Modified POA    * (Principal) Cellulitis of both lower extremities 6/5/2022 Yes    Stage 3 chronic kidney disease ([facility] [address] 6/6/2022 Yes    Type 2 diabetes mellitus with circulatory disorder, without long-term current use of insulin ([facility] [address] 6/6/2022 Yes             ''''''''''       MD GENA Chavira[address], [address] [place] [address].    Phone [phone]   Fax: [phone]  Answering Service: [phone]

## 2022-06-11 NOTE — CARE COORDINATION
ONGOING DISCHARGE PLAN:    Patient is alert and oriented x4. Spoke with patient regarding discharge plan and patient confirms that plan is still to go to [facility]. Precert was started on 1/67/68 per  notes. Eliquis PTA for DVT. PO doxy, PO lasix    Will continue to follow for additional discharge needs.     Electronically signed by Tiesha Zavala RN on 6/11/2022 at 10:29 AM

## 2022-06-12 LAB
HCT VFR BLD CALC: 33.5 % (ref 36–46)
HEMOGLOBIN: 11.1 G/DL (ref 12–16)
MCH RBC QN AUTO: 31.6 PG (ref 26–34)
MCHC RBC AUTO-ENTMCNC: 33.2 G/DL (ref 31–37)
MCV RBC AUTO: 95.1 FL (ref 80–100)
PDW BLD-RTO: 13.6 % (ref 11.5–14.9)
PLATELET # BLD: 344 K/UL (ref 150–450)
PMV BLD AUTO: 7.5 FL (ref 6–12)
RBC # BLD: 3.52 M/UL (ref 4–5.2)
WBC # BLD: 5.4 K/UL (ref 3.5–11)

## 2022-06-12 PROCEDURE — 1200000000 HC SEMI PRIVATE

## 2022-06-12 PROCEDURE — 6370000000 HC RX 637 (ALT 250 FOR IP): Performed by: STUDENT IN AN ORGANIZED HEALTH CARE EDUCATION/TRAINING PROGRAM

## 2022-06-12 PROCEDURE — 6370000000 HC RX 637 (ALT 250 FOR IP): Performed by: NURSE PRACTITIONER

## 2022-06-12 PROCEDURE — 99222 1ST HOSP IP/OBS MODERATE 55: CPT | Performed by: INTERNAL MEDICINE

## 2022-06-12 PROCEDURE — 85027 COMPLETE CBC AUTOMATED: CPT

## 2022-06-12 PROCEDURE — 36415 COLL VENOUS BLD VENIPUNCTURE: CPT

## 2022-06-12 RX ADMIN — TRAZODONE HYDROCHLORIDE 50 MG: 50 TABLET ORAL at 20:29

## 2022-06-12 RX ADMIN — FENOFIBRATE 160 MG: 160 TABLET ORAL at 09:00

## 2022-06-12 RX ADMIN — CITALOPRAM HYDROBROMIDE 20 MG: 20 TABLET ORAL at 09:00

## 2022-06-12 RX ADMIN — FUROSEMIDE 20 MG: 20 TABLET ORAL at 09:00

## 2022-06-12 RX ADMIN — BUSPIRONE HYDROCHLORIDE 5 MG: 5 TABLET ORAL at 09:00

## 2022-06-12 RX ADMIN — ACETAMINOPHEN 650 MG: 325 TABLET ORAL at 17:43

## 2022-06-12 RX ADMIN — FERROUS SULFATE TAB 325 MG (65 MG ELEMENTAL FE) 325 MG: 325 (65 FE) TAB at 20:29

## 2022-06-12 RX ADMIN — BUSPIRONE HYDROCHLORIDE 5 MG: 5 TABLET ORAL at 14:04

## 2022-06-12 RX ADMIN — Medication 6 MG: at 20:28

## 2022-06-12 RX ADMIN — BUSPIRONE HYDROCHLORIDE 5 MG: 5 TABLET ORAL at 20:29

## 2022-06-12 RX ADMIN — CALCIUM CARBONATE 600 MG (1,500 MG)-VITAMIN D3 400 UNIT TABLET 1 TABLET: at 20:29

## 2022-06-12 RX ADMIN — DOXYCYCLINE 100 MG: 100 CAPSULE ORAL at 08:59

## 2022-06-12 RX ADMIN — APIXABAN 5 MG: 5 TABLET, FILM COATED ORAL at 09:00

## 2022-06-12 RX ADMIN — CYANOCOBALAMIN TAB 1000 MCG 1000 MCG: 1000 TAB at 20:29

## 2022-06-12 RX ADMIN — GABAPENTIN 200 MG: 100 CAPSULE ORAL at 09:00

## 2022-06-12 RX ADMIN — APIXABAN 5 MG: 5 TABLET, FILM COATED ORAL at 20:29

## 2022-06-12 RX ADMIN — ACETAMINOPHEN 650 MG: 325 TABLET ORAL at 06:17

## 2022-06-12 RX ADMIN — CARVEDILOL 12.5 MG: 12.5 TABLET, FILM COATED ORAL at 20:29

## 2022-06-12 RX ADMIN — GABAPENTIN 200 MG: 100 CAPSULE ORAL at 20:28

## 2022-06-12 RX ADMIN — ATORVASTATIN CALCIUM 40 MG: 40 TABLET, FILM COATED ORAL at 20:29

## 2022-06-12 RX ADMIN — AMLODIPINE BESYLATE 5 MG: 5 TABLET ORAL at 09:00

## 2022-06-12 RX ADMIN — FERROUS SULFATE TAB 325 MG (65 MG ELEMENTAL FE) 325 MG: 325 (65 FE) TAB at 09:00

## 2022-06-12 RX ADMIN — CARVEDILOL 12.5 MG: 12.5 TABLET, FILM COATED ORAL at 08:59

## 2022-06-12 RX ADMIN — PRAMIPEXOLE DIHYDROCHLORIDE 0.5 MG: 0.25 TABLET ORAL at 20:30

## 2022-06-12 RX ADMIN — DOXYCYCLINE 100 MG: 100 CAPSULE ORAL at 20:29

## 2022-06-12 ASSESSMENT — PAIN SCALES - GENERAL
PAINLEVEL_OUTOF10: 5
PAINLEVEL_OUTOF10: 4

## 2022-06-12 ASSESSMENT — PAIN DESCRIPTION - LOCATION
LOCATION: NECK;HEAD
LOCATION: HEAD

## 2022-06-12 ASSESSMENT — PAIN DESCRIPTION - DESCRIPTORS
DESCRIPTORS: ACHING;DISCOMFORT
DESCRIPTORS: ACHING

## 2022-06-12 NOTE — PLAN OF CARE
Problem: Discharge Planning  Goal: Discharge to home or other facility with appropriate resources  Outcome: Progressing     Problem: Skin/Tissue Integrity  Goal: Absence of new skin breakdown  Description: 1. Monitor for areas of redness and/or skin breakdown  2. Assess vascular access sites hourly  3. Every 4-6 hours minimum:  Change oxygen saturation probe site  4. Every 4-6 hours:  If on nasal continuous positive airway pressure, respiratory therapy assess nares and determine need for appliance change or resting period.   Outcome: Progressing     Problem: Safety - Adult  Goal: Free from fall injury  Outcome: Progressing     Problem: Chronic Conditions and Co-morbidities  Goal: Patient's chronic conditions and co-morbidity symptoms are monitored and maintained or improved  Outcome: Progressing     Problem: ABCDS Injury Assessment  Goal: Absence of physical injury  Outcome: Progressing     Problem: Pain  Goal: Verbalizes/displays adequate comfort level or baseline comfort level  Outcome: Progressing     Problem: Skin/Tissue Integrity - Adult  Goal: Skin integrity remains intact  Outcome: Progressing     Problem: Musculoskeletal - Adult  Goal: Return ADL status to a safe level of function  Outcome: Progressing     Problem: Infection - Adult  Goal: Absence of infection during hospitalization  Outcome: Progressing

## 2022-06-12 NOTE — PROGRESS NOTES
Nutrition Assessment     Type and Reason for Visit: RD Nutrition Re-Screen/LOS    Nutrition Recommendations/Plan:   1. Will continue 2 g Na diet. Malnutrition Assessment:  Malnutrition Status:  no malnutrition    Nutrition Assessment:  Pt was admitted for BLE cellulitis. She is meeting nutrition needs with intake consistently more than 75%. H/O DM noted with Glu 119 despite Regular diet. Low nutrition risk. Nutrition Related Findings:     Wound Type:  (scabbed areas on legs)    Current Nutrition Therapies:    ADULT DIET; Regular;  Low Sodium (2 gm)    Anthropometric Measures:  · Height: 5' 3\" (160 cm)  · Current Body Wt: 185 lb (83.9 kg)   · BMI: 32.8    Nutrition Diagnosis:   No nutrition diagnosis at this time     Nutrition Interventions:   Food and/or Nutrient Delivery: Continue Current Diet              Nutrition Monitoring and Evaluation:      Food/Nutrient Intake Outcomes: Food and Nutrient Intake       Discharge Planning:    Continue current diet     Zahra Brito, [address] [place] [address],   Contact: [phone]

## 2022-06-12 NOTE — PLAN OF CARE
Problem: Discharge Planning  Goal: Discharge to home or other facility with appropriate resources  Outcome: Progressing  Flowsheets (Taken 6/12/2022 1701)  Discharge to home or other facility with appropriate resources:   Identify barriers to discharge with patient and caregiver   Arrange for needed discharge resources and transportation as appropriate   Identify discharge learning needs (meds, wound care, etc)   Arrange for interpreters to assist at discharge as needed   Refer to discharge planning if patient needs post-hospital services based on physician order or complex needs related to functional status, cognitive ability or social support system  Note: Discharge planners following for further discharge needs      Problem: Skin/Tissue Integrity  Goal: Absence of new skin breakdown  Description: 1. Monitor for areas of redness and/or skin breakdown  2. Assess vascular access sites hourly  3. Every 4-6 hours minimum:  Change oxygen saturation probe site  4. Every 4-6 hours:  If on nasal continuous positive airway pressure, respiratory therapy assess nares and determine need for appliance change or resting period. Outcome: Progressing  Note: Skin assessment as charted. No new areas of skin breakdown noted. Problem: Safety - Adult  Goal: Free from fall injury  Outcome: Progressing  Flowsheets (Taken 6/12/2022 1701)  Free From Fall Injury:   Instruct family/caregiver on patient safety   Based on caregiver fall risk screen, instruct family/caregiver to ask for assistance with transferring infant if caregiver noted to have fall risk factors  Note: No falls this shift. Call light with in reach, side rails up x2, bed in lowest postion. Patients safety maintained.       Problem: Chronic Conditions and Co-morbidities  Goal: Patient's chronic conditions and co-morbidity symptoms are monitored and maintained or improved  Outcome: Progressing     Problem: ABCDS Injury Assessment  Goal: Absence of physical injury  Outcome: Progressing  Flowsheets (Taken 6/12/2022 1701)  Absence of Physical Injury: Implement safety measures based on patient assessment  Note: No injury this shift. Patients safety maintained. Problem: Pain  Goal: Verbalizes/displays adequate comfort level or baseline comfort level  Outcome: Progressing  Flowsheets (Taken 6/12/2022 1701)  Verbalizes/displays adequate comfort level or baseline comfort level:   Encourage patient to monitor pain and request assistance   Assess pain using appropriate pain scale   Administer analgesics based on type and severity of pain and evaluate response   Implement non-pharmacological measures as appropriate and evaluate response   Consider cultural and social influences on pain and pain management   Notify Licensed Independent Practitioner if interventions unsuccessful or patient reports new pain  Note: No c/o any discomfort this shift. Problem: Skin/Tissue Integrity - Adult  Goal: Skin integrity remains intact  Outcome: Progressing  Flowsheets (Taken 6/12/2022 1701)  Skin Integrity Remains Intact:   Monitor for areas of redness and/or skin breakdown   Assess vascular access sites hourly   Every 4-6 hours minimum: Change oxygen saturation probe site   Every 4-6 hours: If on nasal continuous positive airway pressure, respiratory therapy assesses nares and determine need for appliance change or resting period  Note: Skin assessment as charted. No new areas of skin breakdown noted.       Problem: Musculoskeletal - Adult  Goal: Return ADL status to a safe level of function  Outcome: Progressing     Problem: Infection - Adult  Goal: Absence of infection during hospitalization  Outcome: Progressing  Flowsheets (Taken 6/12/2022 1701)  Absence of infection during hospitalization:   Assess and monitor for signs and symptoms of infection   Monitor lab/diagnostic results   Monitor all insertion sites i.e., indwelling lines, tubes and drains   Monitor endotracheal (as able) and nasal secretions for changes in amount and color   [place] appropriate cooling/warming therapies per order   Administer medications as ordered   Instruct and encourage patient and family to use good hand hygiene technique   Identify and instruct in appropriate isolation precautions for identified infection/condition

## 2022-06-12 NOTE — CARE COORDINATION
ONGOING DISCHARGE PLANNING NOTE:    Writer reviewed LSW notes, and discharge plan is [employer]. Precert started 6/95/1816.      Electronically signed by Billie Mancia RN on 6/12/2022 at 8:56 AM

## 2022-06-12 NOTE — PROGRESS NOTES
[facility]    Progress Note     6/12/2022    10:41 AM    Name:   Noa Valentin  MRN:     [id number]     Acct:      [de-identified]   Room:   2064/2064-01  IP Day:  7  Admit Date:  6/5/2022  1:47 PM    PCP:   Brenda Sanchez MD  Code Status:  Full Code    Subjective:     C/C:   Chief Complaint   Patient presents with    Leg Swelling     Principal Problem:    Cellulitis of both lower extremities  Active Problems:    Stage 3 chronic kidney disease ([facility] [address]    Type 2 diabetes mellitus with circulatory disorder, without long-term current use of insulin ([facility] [address]  Resolved Problems:    * No resolved hospital problems. *      6/11/22  No acute issues overnight. Patient working with PT/OT  Waiting for pre-CERT. Discharge planning in process             Significant last 24 hr data reviewed ;   Vitals:    06/11/22 1357 06/11/22 2050 06/12/22 0600 06/12/22 0617   BP: 137/60 (!) 124/53  (!) 144/62   Pulse: 68 65  60   Resp: 18 18  20   Temp: 98.5 °F (36.9 °C) 98.1 °F (36.7 °C)  97.7 °F (36.5 °C)   TempSrc: Oral      SpO2: 95% 93%  96%   Weight:   185 lb 3 oz (84 kg)    Height:          Recent Results (from the past 24 hour(s))   CBC    Collection Time: 06/12/22  6:34 AM   Result Value Ref Range    WBC 5.4 3.5 - 11.0 k/uL    RBC 3.52 (L) 4.0 - 5.2 m/uL    Hemoglobin 11.1 (L) 12.0 - 16.0 g/dL    Hematocrit 33.5 (L) 36 - 46 %    MCV 95.1 80 - 100 fL    MCH 31.6 26 - 34 pg    MCHC 33.2 31 - 37 g/dL    RDW 13.6 11.5 - 14.9 %    Platelets 285 630 - 287 k/uL    MPV 7.5 6.0 - 12.0 fL     No results for input(s): POCGLU in the last 72 hours.    CT Head WO Contrast    Result Date: 6/5/2022  EXAMINATION: CT OF THE HEAD WITHOUT CONTRAST  6/5/2022 2:31 pm TECHNIQUE: CT of the head was performed without the administration of intravenous contrast. Automated exposure control, iterative reconstruction, and/or weight based adjustment of the mA/kV was utilized to reduce the radiation dose to as low as reasonably achievable. COMPARISON: None. HISTORY: ORDERING SYSTEM PROVIDED HISTORY: fall, head injury TECHNOLOGIST PROVIDED HISTORY: fall, head injury Decision Support Exception - unselect if not a suspected or confirmed emergency medical condition->Emergency Medical Condition (MA) Reason for Exam: fall, head injury Additional signs and symptoms: Pt states fall with head injury a few days ago FINDINGS: BRAIN/VENTRICLES: There is no acute intracranial hemorrhage, mass effect or midline shift. No abnormal extra-axial fluid collection. The gray-white differentiation is maintained without evidence of an acute infarct. There is no evidence of hydrocephalus. ORBITS: The visualized portion of the orbits demonstrate no acute abnormality. SINUSES: The visualized paranasal sinuses and mastoid air cells demonstrate no acute abnormality. 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 SOFT TISSUES/SKULL:  No acute abnormality of the visualized skull or soft tissues. No acute intracranial abnormality. CT Cervical Spine WO Contrast    Result Date: 6/5/2022  EXAMINATION: CT OF THE CERVICAL SPINE WITHOUT CONTRAST 6/5/2022 11:33 am TECHNIQUE: CT of the cervical spine was performed without the administration of intravenous contrast. Multiplanar reformatted images are provided for review. Automated exposure control, iterative reconstruction, and/or weight based adjustment of the mA/kV was utilized to reduce the radiation dose to as low as reasonably achievable.  COMPARISON: 03/16/2022 HISTORY: ORDERING SYSTEM PROVIDED HISTORY: fall, neck pain TECHNOLOGIST PROVIDED HISTORY: fall, neck pain Decision Support Exception - unselect if not a suspected or confirmed emergency medical condition->Emergency Medical Condition (MA) Reason for Exam: fall, head injury Additional signs and symptoms: Pt states fall a few days ago and complains of neck pain FINDINGS: BONES/ALIGNMENT: There is stable Q3-G5 posterior metallic fusion with associated laminectomies at this level. No evidence of instrumentation loosening or failure. Stable reversal of the normal cervical lordosis. No fracture or osseous destructive lesion. DEGENERATIVE CHANGES: Multilevel degenerative disc disease is noted in the cervical spine which is mild in severity. This is greatest at C6-C7. SOFT TISSUES: Vascular calcifications are noted along the aorta. The lung apices are clear. The thyroid gland is heterogeneous. There is a right-sided thyroid nodule that is low in attenuation measuring 4 mm, benign. No follow-up imaging is required. Vascular calcifications are noted in the carotid bulbs. No acute osseous abnormality of the cervical spine. No significant change from prior exam     XR CHEST PORTABLE    Result Date: 6/5/2022  EXAMINATION: ONE XRAY VIEW OF THE CHEST 6/5/2022 2:15 pm COMPARISON: April 8, 2022 HISTORY: ORDERING SYSTEM PROVIDED HISTORY: shortness of breath TECHNOLOGIST PROVIDED HISTORY: shortness of breath Reason for Exam: Shortness of breath FINDINGS: The lungs are without acute focal process. There is no effusion or pneumothorax. The cardiomediastinal silhouette is without acute process. The osseous structures are without acute process. No acute process.      VL Lower Extremity Bilateral Venous Duplex    Result Date: 6/5/2022    [facility]  Vascular Lower Extremities DVT Study Procedure   Patient Name   Suman Johnson       Date of Study           06/05/2022                 Amanda Kern   Date of Birth  1950  Gender                  Female   Age            71 year(s)  Race                       Room Number    SANDIP   Corporate ID # [id number]   Patient Acct # [de-identified]   MR #           [id number]      Sonographer             Jordan Carnes   Accession #    [id number]  Interpreting Physician  Papo Olivarez   Referring                  Referring Physician     Jax Borja  Nurse  Practitioner Procedure Type of Study:   Veins: Lower Extremities DVT Study, Venous Scan Lower Bilateral.  Indications for Study:Leg Swelling. Patient Status:ER. Technical Quality:Limited visualization. Limitation reason:swelling. Comments: Indications: leg swelling, wells score of 4. Patient indicates she is on Eliquis twice a day and has a history of cellulitis. Conclusions   Summary   No evidence of superficial or deep venous thrombosis in both lower  extremities. Signature   ----------------------------------------------------------------  Electronically signed by Jordan Carnes(Sonographer) on  06/05/2022 08:33 PM  ----------------------------------------------------------------   ----------------------------------------------------------------  Electronically signed by Marlowe Gardener Reyes,Arthur(Interpreting  physician) on 06/05/2022 09:59 PM  ----------------------------------------------------------------  Findings:   Right Impression:                    Left Impression:   The common femoral, proximal         The common femoral, proximal femoral,  femoral, and popliteal veins         and popliteal veins demonstrate  demonstrate normal compressibility. normal compressibility. Normal compressibility of the great  Normal compressibility of the great  saphenous vein. saphenous vein. Normal compressibility of the small  Normal compressibility of the small  saphenous vein. saphenous vein. Velocities are measured in cm/s ; Diameters are measured in cm Right Lower Extremities DVT Study Measurements Right 2D Measurements +------------------------------------+----------+---------------+----------+ ! Location                            ! Visualized! Compressibility! Thrombosis! +------------------------------------+----------+---------------+----------+ ! Common Femoral                      !Yes       ! Yes            ! None      ! +------------------------------------+----------+---------------+----------+ ! Prox Femoral                        !Yes       ! Yes            ! None      ! +------------------------------------+----------+---------------+----------+ ! Mid Femoral                         !No        !               !          ! +------------------------------------+----------+---------------+----------+ ! Dist Femoral                        !No        !               !          ! +------------------------------------+----------+---------------+----------+ ! Deep Femoral                        !No        !               !          ! +------------------------------------+----------+---------------+----------+ ! Popliteal                           !Yes       ! Yes            ! None      ! +------------------------------------+----------+---------------+----------+ ! Sapheno Femoral Junction            ! Yes       ! Yes            ! None      ! +------------------------------------+----------+---------------+----------+ ! PTV                                 ! Partial   !Yes            ! None      ! +------------------------------------+----------+---------------+----------+ ! Peroneal                            !No        !               !          ! +------------------------------------+----------+---------------+----------+ ! Gastroc                             ! Partial   !Yes            ! None      ! +------------------------------------+----------+---------------+----------+ ! GSV Thigh                           ! Yes       ! Yes            ! None      ! +------------------------------------+----------+---------------+----------+ ! GSV Knee                            ! Yes       ! Yes            ! None      ! +------------------------------------+----------+---------------+----------+ ! GSV Ankle                           ! Yes       ! Yes            ! None      ! +------------------------------------+----------+---------------+----------+ ! SSV !Yes       !Yes            ! None      ! +------------------------------------+----------+---------------+----------+ Right Doppler Measurements +---------------------------+------+------+--------------------------------+ ! Location                   ! Signal!Reflux! Reflux (msec)                   ! +---------------------------+------+------+--------------------------------+ ! Common Femoral             !Phasic!      !                                ! +---------------------------+------+------+--------------------------------+ ! Prox Femoral               !Phasic!      !                                ! +---------------------------+------+------+--------------------------------+ ! Popliteal                  !Phasic!      !                                ! +---------------------------+------+------+--------------------------------+ Left Lower Extremities DVT Study Measurements Left 2D Measurements +------------------------------------+----------+---------------+----------+ ! Location                            ! Visualized! Compressibility! Thrombosis! +------------------------------------+----------+---------------+----------+ ! Common Femoral                      !Yes       ! Yes            ! None      ! +------------------------------------+----------+---------------+----------+ ! Prox Femoral                        !Yes       ! Yes            ! None      ! +------------------------------------+----------+---------------+----------+ ! Mid Femoral                         !No        !               !          ! +------------------------------------+----------+---------------+----------+ ! Dist Femoral                        !No        !               !          ! +------------------------------------+----------+---------------+----------+ ! Deep Femoral                        !No        !               !          ! +------------------------------------+----------+---------------+----------+ ! Popliteal !Yes       !Yes            ! None      ! +------------------------------------+----------+---------------+----------+ ! Sapheno Femoral Junction            ! Yes       ! Yes            ! None      ! +------------------------------------+----------+---------------+----------+ ! PTV                                 ! Partial   !Yes            ! None      ! +------------------------------------+----------+---------------+----------+ ! Peroneal                            !No        !               !          ! +------------------------------------+----------+---------------+----------+ ! Gastroc                             ! Partial   !Yes            ! None      ! +------------------------------------+----------+---------------+----------+ ! GSV Thigh                           ! Yes       ! Yes            ! None      ! +------------------------------------+----------+---------------+----------+ ! GSV Knee                            ! Yes       ! Yes            ! None      ! +------------------------------------+----------+---------------+----------+ ! GSV Ankle                           ! Yes       ! Yes            ! None      ! +------------------------------------+----------+---------------+----------+ ! SSV                                 ! Yes       ! Yes            ! None      ! +------------------------------------+----------+---------------+----------+ Left Doppler Measurements +---------------------------+------+------+--------------------------------+ ! Location                   ! Signal!Reflux! Reflux (msec)                   ! +---------------------------+------+------+--------------------------------+ ! Common Femoral             !Phasic! No    !                                ! +---------------------------+------+------+--------------------------------+ ! Prox Femoral               !Phasic!      !                                ! +---------------------------+------+------+--------------------------------+ ! Popliteal !Phasic!      !                                ! +---------------------------+------+------+--------------------------------+          On admission     The patient is a 71 y.o.  female, with a history of anemia, spondylolisthesis, DVT, chronic diastolic heart failure, CVA, C. difficile, HTN, CKD stage III, peptic ulcer disease, and DM type II, who presents with leg swelling. According to patient and her , legs have been increasingly edematous with progressive erythema over the past 2 weeks. Patient was admitted to this facility in March for frequent falls and found to have DVT. Patient reports that she was concerned for recurrent DVT, despite being compliant with Eliquis. Following inpatient treatment, patient received intensive therapy and acute rehab department but reports having 5 falls since that time. Patient has a history of left sided weakness from old CVA.  thinks falls are due to poor technique with transfers d/t fear of falling, plus edema makes it difficult to lift legs to walk. Symptoms are associated with painful hematoma on posterior scalp from recent fall. Patient also reports dry non-productive cough, which is residual from recent bronchitis. Denies fever, chills, chest pain, abdominal pain, nausea, vomiting, diarrhea, and urinary symptoms. There are no aggravating or alleviating factors. Symptoms are reported as constant and moderate to severe; progressively worsening. Review of Systems:     Constitutional: Negative for chills, diaphoresis and fever. HENT: Negative for congestion and sore throat. Respiratory: Positive for cough (dry, nonproductive). Negative for shortness of breath and wheezing. Cardiovascular: Positive for leg swelling (reports weeping edema). Negative for chest pain and palpitations. Gastrointestinal: Negative for abdominal pain, constipation, diarrhea, nausea and vomiting.    Genitourinary: Negative for dysuria, frequency and urgency. Musculoskeletal: Negative for back pain and myalgias. Skin: Positive for color change (erythema, BLE). Negative for rash. Neurological: Positive for weakness (generalized weakness) and headaches (pain on posterior scalp from recent fall. ). Negative for dizziness. Psychiatric/Behavioral: The patient is not nervous/anxious. Data:     Past Medical History: No change     Social History: No change    Family History: @no change    Vitals:  Reviewed    I/O (24Hr): No intake or output data in the 24 hours ending 22 1041  Labs:  CBC from today. Creatine from today. Radiology:  N/A    Medications:     Current Meds:   Scheduled Meds:    doxycycline monohydrate  100 mg Oral 2 times per day    furosemide  20 mg Oral Daily    amLODIPine  5 mg Oral Daily    apixaban  5 mg Oral BID    atorvastatin  40 mg Oral Nightly    busPIRone  5 mg Oral TID    calcium carb-cholecalciferol  1 tablet Oral Nightly    carvedilol  12.5 mg Oral BID    citalopram  20 mg Oral Daily    cyanocobalamin  1,000 mcg Oral Nightly    fenofibrate  160 mg Oral Daily    ferrous sulfate  325 mg Oral BID    gabapentin  200 mg Oral BID    pramipexole  0.5 mg Oral Nightly    sodium chloride flush  5-40 mL IntraVENous 2 times per day     Continuous Infusions:    sodium chloride 20 mL/hr at 22 2322     PRN Meds: traZODone, melatonin, sodium chloride flush, sodium chloride, potassium chloride **OR** potassium alternative oral replacement **OR** potassium chloride, magnesium sulfate, ondansetron **OR** ondansetron, polyethylene glycol, acetaminophen **OR** acetaminophen    Physical Examination:        BP (!) 144/62   Pulse 60   Temp 97.7 °F (36.5 °C)   Resp 20   Ht 5' 3\" (1.6 m)   Wt 185 lb 3 oz (84 kg)   SpO2 96%   BMI 32.80 kg/m²   Temp (24hrs), Av.1 °F (36.7 °C), Min:97.7 °F (36.5 °C), Max:98.5 °F (36.9 °C)    No results for input(s): POCGLU in the last 72 hours.   No intake or output data in the 24 hours ending 06/12/22 1041  Constitutional:       General: She is not in acute distress. Appearance: She is well-developed. She is not diaphoretic. HENT:      Head: Normocephalic and atraumatic. Eyes:      Conjunctiva/sclera: Conjunctivae normal.      Pupils: Pupils are equal, round, and reactive to light. Neck:      Trachea: No tracheal deviation. Cardiovascular:      Rate and Rhythm: Normal rate and regular rhythm. Heart sounds: Normal heart sounds. No murmur heard. No friction rub. No gallop. Pulmonary:      Effort: Pulmonary effort is normal. No respiratory distress. Breath sounds: Normal breath sounds. No wheezing or rales. Chest:      Chest wall: No tenderness. Abdominal:      General: Bowel sounds are normal. There is no distension. Palpations: Abdomen is soft. Tenderness: There is no abdominal tenderness. There is no guarding. Musculoskeletal:         General: No tenderness. Normal range of motion. Cervical back: Normal range of motion and neck supple. Right lower leg: Edema (1+) present. Left lower leg: Edema (1+) present. Comments: Patient with 1+ pitting edema bilateral lower extremities that extends up your groin. Bilateral lower legs are mildly erythematous with increased warmth. There are multiple small scabbed areas on bilateral shins. Lymphadenopathy:      Cervical: No cervical adenopathy. Skin:     General: Skin is warm and dry. Coloration: Skin is not pale. Findings: Erythema (BLE) present. No rash. Neurological:      Mental Status: She is alert and oriented to person, place, and time. Motor: No seizure activity. Coordination: Coordination normal.   Psychiatric:         Behavior: Behavior normal.         Thought Content:  Thought content normal.     No lower extremity calf tenderness bilaterally   Redness/Induration of anterior legs bilaterally reduced     Assessment:        Primary Problem  Cellulitis of both lower extremities    Principal Problem:    Cellulitis of both lower extremities  Active Problems:    Stage 3 chronic kidney disease (HCC)    Type 2 diabetes mellitus with circulatory disorder, without long-term current use of insulin (HCC)  Resolved Problems:    * No resolved hospital problems. *     Plan:                 6/11/22  No acute issues overnight. Patient working with PT/OT  Waiting for pre-CERT.   Discharge planning in process         6/12/22    Progress ok  Await precert for placement                 Flaco Aj MD  PGY-3 Internal Medicine Resident  [address] [place] [address]  6/12/2022 10:41 AM

## 2022-06-13 LAB
HCT VFR BLD CALC: 33.2 % (ref 36–46)
HEMOGLOBIN: 10.7 G/DL (ref 12–16)
MCH RBC QN AUTO: 31.5 PG (ref 26–34)
MCHC RBC AUTO-ENTMCNC: 32.4 G/DL (ref 31–37)
MCV RBC AUTO: 97.3 FL (ref 80–100)
PDW BLD-RTO: 14 % (ref 11.5–14.9)
PLATELET # BLD: 296 K/UL (ref 150–450)
PMV BLD AUTO: 6.9 FL (ref 6–12)
RBC # BLD: 3.41 M/UL (ref 4–5.2)
WBC # BLD: 5.5 K/UL (ref 3.5–11)

## 2022-06-13 PROCEDURE — 99232 SBSQ HOSP IP/OBS MODERATE 35: CPT | Performed by: INTERNAL MEDICINE

## 2022-06-13 PROCEDURE — 36415 COLL VENOUS BLD VENIPUNCTURE: CPT

## 2022-06-13 PROCEDURE — 6370000000 HC RX 637 (ALT 250 FOR IP): Performed by: NURSE PRACTITIONER

## 2022-06-13 PROCEDURE — 6370000000 HC RX 637 (ALT 250 FOR IP): Performed by: STUDENT IN AN ORGANIZED HEALTH CARE EDUCATION/TRAINING PROGRAM

## 2022-06-13 PROCEDURE — 85027 COMPLETE CBC AUTOMATED: CPT

## 2022-06-13 PROCEDURE — 1200000000 HC SEMI PRIVATE

## 2022-06-13 RX ADMIN — CARVEDILOL 12.5 MG: 12.5 TABLET, FILM COATED ORAL at 08:30

## 2022-06-13 RX ADMIN — CYANOCOBALAMIN TAB 1000 MCG 1000 MCG: 1000 TAB at 20:37

## 2022-06-13 RX ADMIN — ATORVASTATIN CALCIUM 40 MG: 40 TABLET, FILM COATED ORAL at 20:37

## 2022-06-13 RX ADMIN — BUSPIRONE HYDROCHLORIDE 5 MG: 5 TABLET ORAL at 20:37

## 2022-06-13 RX ADMIN — BUSPIRONE HYDROCHLORIDE 5 MG: 5 TABLET ORAL at 08:29

## 2022-06-13 RX ADMIN — GABAPENTIN 200 MG: 100 CAPSULE ORAL at 20:37

## 2022-06-13 RX ADMIN — CARVEDILOL 12.5 MG: 12.5 TABLET, FILM COATED ORAL at 20:37

## 2022-06-13 RX ADMIN — CALCIUM CARBONATE 600 MG (1,500 MG)-VITAMIN D3 400 UNIT TABLET 1 TABLET: at 20:37

## 2022-06-13 RX ADMIN — DOXYCYCLINE 100 MG: 100 CAPSULE ORAL at 20:37

## 2022-06-13 RX ADMIN — APIXABAN 5 MG: 5 TABLET, FILM COATED ORAL at 08:30

## 2022-06-13 RX ADMIN — Medication 6 MG: at 20:44

## 2022-06-13 RX ADMIN — ACETAMINOPHEN 650 MG: 325 TABLET ORAL at 08:36

## 2022-06-13 RX ADMIN — FERROUS SULFATE TAB 325 MG (65 MG ELEMENTAL FE) 325 MG: 325 (65 FE) TAB at 08:29

## 2022-06-13 RX ADMIN — GABAPENTIN 200 MG: 100 CAPSULE ORAL at 08:29

## 2022-06-13 RX ADMIN — CITALOPRAM HYDROBROMIDE 20 MG: 20 TABLET ORAL at 08:29

## 2022-06-13 RX ADMIN — AMLODIPINE BESYLATE 5 MG: 5 TABLET ORAL at 08:29

## 2022-06-13 RX ADMIN — DOXYCYCLINE 100 MG: 100 CAPSULE ORAL at 08:29

## 2022-06-13 RX ADMIN — APIXABAN 5 MG: 5 TABLET, FILM COATED ORAL at 20:37

## 2022-06-13 RX ADMIN — ACETAMINOPHEN 650 MG: 325 TABLET ORAL at 20:44

## 2022-06-13 RX ADMIN — PRAMIPEXOLE DIHYDROCHLORIDE 0.5 MG: 0.25 TABLET ORAL at 20:37

## 2022-06-13 RX ADMIN — FENOFIBRATE 160 MG: 160 TABLET ORAL at 08:29

## 2022-06-13 RX ADMIN — FERROUS SULFATE TAB 325 MG (65 MG ELEMENTAL FE) 325 MG: 325 (65 FE) TAB at 20:37

## 2022-06-13 RX ADMIN — BUSPIRONE HYDROCHLORIDE 5 MG: 5 TABLET ORAL at 14:12

## 2022-06-13 RX ADMIN — FUROSEMIDE 20 MG: 20 TABLET ORAL at 08:30

## 2022-06-13 ASSESSMENT — PAIN DESCRIPTION - DESCRIPTORS: DESCRIPTORS: ACHING

## 2022-06-13 ASSESSMENT — PAIN DESCRIPTION - ORIENTATION: ORIENTATION: ANTERIOR

## 2022-06-13 ASSESSMENT — PAIN SCALES - GENERAL
PAINLEVEL_OUTOF10: 3
PAINLEVEL_OUTOF10: 4
PAINLEVEL_OUTOF10: 5
PAINLEVEL_OUTOF10: 3

## 2022-06-13 ASSESSMENT — PAIN - FUNCTIONAL ASSESSMENT: PAIN_FUNCTIONAL_ASSESSMENT: ACTIVITIES ARE NOT PREVENTED

## 2022-06-13 ASSESSMENT — PAIN DESCRIPTION - LOCATION: LOCATION: HEAD

## 2022-06-13 NOTE — CARE COORDINATION
AMELIA spoke with Colin Morley at [facility]. She informed AMELIA that the Pre-cert is still pending. AMELIA will continue to follow.      Electronically signed by Viky Rajan on 6/13/22 at 12:20 PM EDT

## 2022-06-13 NOTE — PLAN OF CARE
Problem: Discharge Planning  Goal: Discharge to home or other facility with appropriate resources  6/13/2022 1628 by Mary Beth Lee RN  Outcome: Progressing  Flowsheets  Taken 6/13/2022 1628  Discharge to home or other facility with appropriate resources:   Identify barriers to discharge with patient and caregiver   Arrange for needed discharge resources and transportation as appropriate  Taken 6/13/2022 0830  Discharge to home or other facility with appropriate resources: Identify barriers to discharge with patient and caregiver  6/13/2022 0436 by Nazia Carlos RN  Outcome: Progressing     Problem: Skin/Tissue Integrity  Goal: Absence of new skin breakdown  Description: 1. Monitor for areas of redness and/or skin breakdown  2. Assess vascular access sites hourly  3. Every 4-6 hours minimum:  Change oxygen saturation probe site  4. Every 4-6 hours:  If on nasal continuous positive airway pressure, respiratory therapy assess nares and determine need for appliance change or resting period.   6/13/2022 0436 by Nazia Carlos RN  Outcome: Progressing     Problem: Safety - Adult  Goal: Free from fall injury  6/13/2022 1628 by Mary Beth Lee RN  Outcome: Progressing  Flowsheets (Taken 6/13/2022 0959)  Free From Fall Injury:   Deshaun Horton family/caregiver on patient safety   Based on caregiver fall risk screen, instruct family/caregiver to ask for assistance with transferring infant if caregiver noted to have fall risk factors  6/13/2022 0436 by Nazia Carlos RN  Outcome: Progressing     Problem: Chronic Conditions and Co-morbidities  Goal: Patient's chronic conditions and co-morbidity symptoms are monitored and maintained or improved  Recent Flowsheet Documentation  Taken 6/13/2022 0830 by Amelia Rivas 34 - Patient's Chronic Conditions and Co-Morbidity Symptoms are Monitored and Maintained or Improved: Monitor and assess patient's chronic conditions and comorbid symptoms for stability, deterioration, or improvement  6/13/2022 0436 by Sherman Esparza RN  Outcome: Progressing     Problem: ABCDS Injury Assessment  Goal: Absence of physical injury  Recent Flowsheet Documentation  Taken 6/13/2022 0959 by Jessica Villanueva RN  Absence of Physical Injury: Implement safety measures based on patient assessment  6/13/2022 0436 by Sherman Esparza RN  Outcome: Progressing     Problem: Pain  Goal: Verbalizes/displays adequate comfort level or baseline comfort level  6/13/2022 1628 by Jessica Villanueva RN  Outcome: Progressing  Flowsheets (Taken 6/12/2022 1701 by Harmony Andres RN)  Verbalizes/displays adequate comfort level or baseline comfort level:   Encourage patient to monitor pain and request assistance   Assess pain using appropriate pain scale   Administer analgesics based on type and severity of pain and evaluate response   Implement non-pharmacological measures as appropriate and evaluate response   Consider cultural and social influences on pain and pain management   Notify Licensed Independent Practitioner if interventions unsuccessful or patient reports new pain  6/13/2022 0436 by Sherman Esparza RN  Outcome: Progressing     Problem: Skin/Tissue Integrity - Adult  Goal: Skin integrity remains intact  6/13/2022 1628 by Jessica Villanueva RN  Outcome: Progressing  Flowsheets (Taken 6/13/2022 0830)  Skin Integrity Remains Intact: Monitor for areas of redness and/or skin breakdown  6/13/2022 0436 by Sherman Esparza RN  Outcome: Progressing     Problem: Musculoskeletal - Adult  Goal: Return ADL status to a safe level of function  6/13/2022 1628 by Jessica Villanueva RN  Outcome: Progressing  Flowsheets (Taken 6/13/2022 0830)  Return ADL Status to a Safe Level of Function: Administer medication as ordered  6/13/2022 0436 by Sherman Esparza RN  Outcome: Progressing     Problem: Infection - Adult  Goal: Absence of infection during hospitalization  6/13/2022 1628 by Jessica Villanueva RN  Outcome: Progressing  Flowsheets (Taken 6/13/2022 0830)  Absence of infection during hospitalization: Assess and monitor for signs and symptoms of infection  6/13/2022 0436 by Nikolay Mancuso RN  Outcome: Progressing

## 2022-06-13 NOTE — PROGRESS NOTES
Ok to dc to [facility] today  holly lower ext cellulitis    Improved  No fever  Doing well  [facility] today  Isa Jon MD  6/13/2022

## 2022-06-13 NOTE — CARE COORDINATION
SW left message with facility to check status of Pre cert.      Electronically signed by Nohemi Rolon on 6/13/22 at 12:02 PM EDT

## 2022-06-13 NOTE — FLOWSHEET NOTE
Leaving for [facility]CF tonight--very ready to \"get on with things. \"     06/13/22 1821   Encounter Summary   Encounter Overview/Reason  Spiritual/Emotional Needs   Service Provided For: Patient   Referral/Consult From: Mike   Last Encounter  06/13/22   Complexity of Encounter Low   Spiritual/Emotional needs   Type Spiritual Support   Assessment/Intervention/Outcome   Assessment Calm   Intervention Active listening;Discussed illness injury and its impact; Explored/Affirmed feelings, thoughts, concerns;Prayer (assurance of)/[place];Sustaining Presence/Ministry of presence   Outcome Coping;Engaged in conversation;Expressed feelings, needs, and concerns;Expressed Gratitude

## 2022-06-13 NOTE — PLAN OF CARE
Problem: Discharge Planning  Goal: Discharge to home or other facility with appropriate resources  6/13/2022 0436 by Blanca Feldman RN  Outcome: Progressing  6/12/2022 1701 by Marie Land RN  Outcome: Progressing  Flowsheets (Taken 6/12/2022 1701)  Discharge to home or other facility with appropriate resources:   Identify barriers to discharge with patient and caregiver   Arrange for needed discharge resources and transportation as appropriate   Identify discharge learning needs (meds, wound care, etc)   Arrange for interpreters to assist at discharge as needed   Refer to discharge planning if patient needs post-hospital services based on physician order or complex needs related to functional status, cognitive ability or social support system  Note: Discharge planners following for further discharge needs      Problem: Skin/Tissue Integrity  Goal: Absence of new skin breakdown  Description: 1. Monitor for areas of redness and/or skin breakdown  2. Assess vascular access sites hourly  3. Every 4-6 hours minimum:  Change oxygen saturation probe site  4. Every 4-6 hours:  If on nasal continuous positive airway pressure, respiratory therapy assess nares and determine need for appliance change or resting period. 6/13/2022 0436 by Blanca Feldman RN  Outcome: Progressing  6/12/2022 1701 by Marie Land RN  Outcome: Progressing  Note: Skin assessment as charted. No new areas of skin breakdown noted. Problem: Safety - Adult  Goal: Free from fall injury  6/13/2022 0436 by Blanca Feldman RN  Outcome: Progressing  6/12/2022 1701 by Marie Land RN  Outcome: Progressing  Flowsheets (Taken 6/12/2022 1701)  Free From Fall Injury:   Cale Man family/caregiver on patient safety   Based on caregiver fall risk screen, instruct family/caregiver to ask for assistance with transferring infant if caregiver noted to have fall risk factors  Note: No falls this shift.   Call light with in reach, side rails up x2, bed in lowest postion. Patients safety maintained. Problem: Chronic Conditions and Co-morbidities  Goal: Patient's chronic conditions and co-morbidity symptoms are monitored and maintained or improved  6/13/2022 0436 by Blanca Feldman RN  Outcome: Progressing  6/12/2022 1701 by Marie Land RN  Outcome: Progressing     Problem: ABCDS Injury Assessment  Goal: Absence of physical injury  6/13/2022 0436 by Blanca Feldman RN  Outcome: Progressing  6/12/2022 1701 by Marie Land RN  Outcome: Progressing  Flowsheets (Taken 6/12/2022 1701)  Absence of Physical Injury: Implement safety measures based on patient assessment  Note: No injury this shift. Patients safety maintained. Problem: Pain  Goal: Verbalizes/displays adequate comfort level or baseline comfort level  6/13/2022 0436 by Blanca Feldman RN  Outcome: Progressing  6/12/2022 1701 by Marie Land RN  Outcome: Progressing  Flowsheets (Taken 6/12/2022 1701)  Verbalizes/displays adequate comfort level or baseline comfort level:   Encourage patient to monitor pain and request assistance   Assess pain using appropriate pain scale   Administer analgesics based on type and severity of pain and evaluate response   Implement non-pharmacological measures as appropriate and evaluate response   Consider cultural and social influences on pain and pain management   Notify Licensed Independent Practitioner if interventions unsuccessful or patient reports new pain  Note: No c/o any discomfort this shift.       Problem: Skin/Tissue Integrity - Adult  Goal: Skin integrity remains intact  6/13/2022 0436 by Blanca Feldman RN  Outcome: Progressing  6/12/2022 1701 by Marie Land RN  Outcome: Progressing  Flowsheets (Taken 6/12/2022 1701)  Skin Integrity Remains Intact:   Monitor for areas of redness and/or skin breakdown   Assess vascular access sites hourly   Every 4-6 hours minimum: Change oxygen saturation probe site   Every 4-6 hours: If on nasal continuous positive airway pressure, respiratory therapy assesses nares and determine need for appliance change or resting period  Note: Skin assessment as charted. No new areas of skin breakdown noted.       Problem: Musculoskeletal - Adult  Goal: Return ADL status to a safe level of function  6/13/2022 0436 by Mychal So RN  Outcome: Progressing  6/12/2022 1701 by Jamal Cox RN  Outcome: Progressing     Problem: Infection - Adult  Goal: Absence of infection during hospitalization  6/13/2022 0436 by Mychal So RN  Outcome: Progressing  6/12/2022 1701 by Jamal Cox RN  Outcome: Progressing  Flowsheets (Taken 6/12/2022 1701)  Absence of infection during hospitalization:   Assess and monitor for signs and symptoms of infection   Monitor lab/diagnostic results   Monitor all insertion sites i.e., indwelling lines, tubes and drains   Monitor endotracheal (as able) and nasal secretions for changes in amount and color   [place] appropriate cooling/warming therapies per order   Administer medications as ordered   Instruct and encourage patient and family to use good hand hygiene technique   Identify and instruct in appropriate isolation precautions for identified infection/condition

## 2022-06-14 VITALS
SYSTOLIC BLOOD PRESSURE: 136 MMHG | DIASTOLIC BLOOD PRESSURE: 69 MMHG | WEIGHT: 185.63 LBS | BODY MASS INDEX: 32.89 KG/M2 | RESPIRATION RATE: 20 BRPM | TEMPERATURE: 97.7 F | HEIGHT: 63 IN | HEART RATE: 68 BPM | OXYGEN SATURATION: 95 %

## 2022-06-14 LAB
HCT VFR BLD CALC: 33.7 % (ref 36–46)
HEMOGLOBIN: 11.3 G/DL (ref 12–16)
MCH RBC QN AUTO: 31.9 PG (ref 26–34)
MCHC RBC AUTO-ENTMCNC: 33.4 G/DL (ref 31–37)
MCV RBC AUTO: 95.3 FL (ref 80–100)
PDW BLD-RTO: 13.9 % (ref 11.5–14.9)
PLATELET # BLD: 327 K/UL (ref 150–450)
PMV BLD AUTO: 7.2 FL (ref 6–12)
RBC # BLD: 3.53 M/UL (ref 4–5.2)
WBC # BLD: 5.1 K/UL (ref 3.5–11)

## 2022-06-14 PROCEDURE — 97110 THERAPEUTIC EXERCISES: CPT

## 2022-06-14 PROCEDURE — 97116 GAIT TRAINING THERAPY: CPT

## 2022-06-14 PROCEDURE — 6370000000 HC RX 637 (ALT 250 FOR IP): Performed by: NURSE PRACTITIONER

## 2022-06-14 PROCEDURE — 97530 THERAPEUTIC ACTIVITIES: CPT

## 2022-06-14 PROCEDURE — 99239 HOSP IP/OBS DSCHRG MGMT >30: CPT | Performed by: INTERNAL MEDICINE

## 2022-06-14 PROCEDURE — 85027 COMPLETE CBC AUTOMATED: CPT

## 2022-06-14 PROCEDURE — 6370000000 HC RX 637 (ALT 250 FOR IP): Performed by: STUDENT IN AN ORGANIZED HEALTH CARE EDUCATION/TRAINING PROGRAM

## 2022-06-14 PROCEDURE — 36415 COLL VENOUS BLD VENIPUNCTURE: CPT

## 2022-06-14 RX ADMIN — CARVEDILOL 12.5 MG: 12.5 TABLET, FILM COATED ORAL at 07:25

## 2022-06-14 RX ADMIN — DOXYCYCLINE 100 MG: 100 CAPSULE ORAL at 07:24

## 2022-06-14 RX ADMIN — APIXABAN 5 MG: 5 TABLET, FILM COATED ORAL at 07:24

## 2022-06-14 RX ADMIN — FUROSEMIDE 20 MG: 20 TABLET ORAL at 07:25

## 2022-06-14 RX ADMIN — BUSPIRONE HYDROCHLORIDE 5 MG: 5 TABLET ORAL at 07:24

## 2022-06-14 RX ADMIN — FERROUS SULFATE TAB 325 MG (65 MG ELEMENTAL FE) 325 MG: 325 (65 FE) TAB at 07:25

## 2022-06-14 RX ADMIN — BUSPIRONE HYDROCHLORIDE 5 MG: 5 TABLET ORAL at 14:08

## 2022-06-14 RX ADMIN — AMLODIPINE BESYLATE 5 MG: 5 TABLET ORAL at 07:25

## 2022-06-14 RX ADMIN — GABAPENTIN 200 MG: 100 CAPSULE ORAL at 07:25

## 2022-06-14 RX ADMIN — CITALOPRAM HYDROBROMIDE 20 MG: 20 TABLET ORAL at 07:24

## 2022-06-14 RX ADMIN — FENOFIBRATE 160 MG: 160 TABLET ORAL at 07:25

## 2022-06-14 ASSESSMENT — PAIN DESCRIPTION - LOCATION: LOCATION: HEAD;NECK

## 2022-06-14 ASSESSMENT — PAIN SCALES - GENERAL: PAINLEVEL_OUTOF10: 5

## 2022-06-14 NOTE — CARE COORDINATION
Authorization received 6/14 for patient to go to [facility]. Transportation scheduled at 3:00pm via [employer]. Facility notified. Patient notified. Patient declined the need  For SW to inform Family. Call to report: [phone]    Please complete ANNALEE. Electronically signed by Kayley Lyn on 6/14/22 at 11:09 AM EDT    Addendum: AVS faxed to facility.      Electronically signed by Kayley Lyn on 6/14/22 at 2:12 PM EDT

## 2022-06-14 NOTE — PLAN OF CARE
Problem: Chronic Conditions and Co-morbidities  Goal: Patient's chronic conditions and co-morbidity symptoms are monitored and maintained or improved  Outcome: Completed     Problem: ABCDS Injury Assessment  Goal: Absence of physical injury  Outcome: Completed     Problem: Pain  Goal: Verbalizes/displays adequate comfort level or baseline comfort level  Outcome: Completed     Problem: Safety - Adult  Goal: Free from fall injury  Outcome: Completed     Problem: Chronic Conditions and Co-morbidities  Goal: Patient's chronic conditions and co-morbidity symptoms are monitored and maintained or improved  Outcome: Completed     Problem: ABCDS Injury Assessment  Goal: Absence of physical injury  Outcome: Completed     Problem: Pain  Goal: Verbalizes/displays adequate comfort level or baseline comfort level  Outcome: Completed     Problem: Skin/Tissue Integrity - Adult  Goal: Skin integrity remains intact  Outcome: Completed     Problem: Musculoskeletal - Adult  Goal: Return ADL status to a safe level of function  Outcome: Completed     Problem: Infection - Adult  Goal: Absence of infection during hospitalization  Outcome: Completed

## 2022-06-14 NOTE — ADT AUTH CERT
pramipexole (MIRAPEX) tablet 0.5 mg   Dose: 0.5 mg   Freq: NIGHTLY Route: PO   vitamin B-12 (CYANOCOBALAMIN) tablet 1,000 mcg   Dose: 1,000 mcg   Freq: Nightly Route: PO   acetaminophen (TYLENOL) tablet 650 mg   Dose: 650 mg   Freq: EVERY 6 HOURS PRN Route: PO x 1   melatonin tablet 6 mg   Dose: 6 mg   Freq: NIGHTLY PRN Route: PO x 1      Orders:   -continue above meds      CM Assessments or Notes:   Precert started today for [facility]. They reported that they will have a bed open tomorrow or Friday. Patient notified of this as well   LSW will continue to follow.           *PT Assessments or Notes:   Assessment:   Activity Tolerance: Patient limited by fatigue;Patient limited by endurance   Equipment Needed: No (pt reports having rollator)    Plan: 5-7 times per week   PT Plan of Care:  (5-7 times per week)

## 2022-06-14 NOTE — PLAN OF CARE
Problem: Discharge Planning  Goal: Discharge to home or other facility with appropriate resources  6/14/2022 0018 by Jay Storey, RN  Outcome: Progressing  Flowsheets (Taken 6/13/2022 1935)  Discharge to home or other facility with appropriate resources:   Identify barriers to discharge with patient and caregiver   Arrange for needed discharge resources and transportation as appropriate   Identify discharge learning needs (meds, wound care, etc)   Refer to discharge planning if patient needs post-hospital services based on physician order or complex needs related to functional status, cognitive ability or social support system     Problem: Skin/Tissue Integrity  Goal: Absence of new skin breakdown  Description: 1. Monitor for areas of redness and/or skin breakdown  2. Assess vascular access sites hourly  3. Every 4-6 hours minimum:  Change oxygen saturation probe site  4. Every 4-6 hours:  If on nasal continuous positive airway pressure, respiratory therapy assess nares and determine need for appliance change or resting period.   Outcome: Progressing     Problem: Safety - Adult  Goal: Free from fall injury  6/14/2022 0018 by Jay Storey, RN  Outcome: Progressing  Flowsheets (Taken 6/14/2022 0017)  Free From Fall Injury:   Instruct family/caregiver on patient safety   Based on caregiver fall risk screen, instruct family/caregiver to ask for assistance with transferring infant if caregiver noted to have fall risk factors     Problem: Chronic Conditions and Co-morbidities  Goal: Patient's chronic conditions and co-morbidity symptoms are monitored and maintained or improved  Outcome: Progressing  Flowsheets (Taken 6/13/2022 1935)  Care Plan - Patient's Chronic Conditions and Co-Morbidity Symptoms are Monitored and Maintained or Improved: Monitor and assess patient's chronic conditions and comorbid symptoms for stability, deterioration, or improvement     Problem: ABCDS Injury Assessment  Goal: Absence of physical injury  Outcome: Progressing  Flowsheets (Taken 6/14/2022 0017)  Absence of Physical Injury: Implement safety measures based on patient assessment     Problem: Pain  Goal: Verbalizes/displays adequate comfort level or baseline comfort level  6/14/2022 0018 by Baljit Donovan RN  Outcome: Progressing     Problem: Skin/Tissue Integrity - Adult  Goal: Skin integrity remains intact  6/14/2022 0018 by Baljit Donovan RN  Outcome: Progressing  Flowsheets  Taken 6/14/2022 0017  Skin Integrity Remains Intact: Monitor for areas of redness and/or skin breakdown  Taken 6/13/2022 1935  Skin Integrity Remains Intact: Monitor for areas of redness and/or skin breakdown     Problem: Musculoskeletal - Adult  Goal: Return ADL status to a safe level of function  6/14/2022 0018 by Baljit Donovan RN  Outcome: Progressing  Flowsheets (Taken 6/13/2022 1935)  Return ADL Status to a Safe Level of Function:   Administer medication as ordered   Assess activities of daily living deficits and provide assistive devices as needed   Assist and instruct patient to increase activity and self care as tolerated   Obtain physical therapy/occupational therapy consults as needed     Problem: Infection - Adult  Goal: Absence of infection during hospitalization  6/14/2022 0018 by Baljit Donovan RN  Outcome: Progressing  Flowsheets (Taken 6/13/2022 1935)  Absence of infection during hospitalization:   Assess and monitor for signs and symptoms of infection   Monitor lab/diagnostic results   Monitor all insertion sites i.e., indwelling lines, tubes and drains   Administer medications as ordered

## 2022-06-14 NOTE — PLAN OF CARE
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 Raynold Claude of care signed (Y/N):     Date of Patient follow up with Physician: 18    G-Code (if applicable):      Date G-Code Applied:         Progress Note: [x]  Yes  []  No  Next due by: Visit #10      Latex Allergy:  [x]NO      []YES  Preferred Language for Healthcare:   [x]English       []other:    Visit # Insurance Allowable Requires auth   11 60    [x]no        []yes:     Pain level: 0/10     SUBJECTIVE: Pt states his shoulder is feeling better today. Was able to do some carlotta throwing without any issues. OBJECTIVE: Pt educated on not pushing range.   Observation:    Test measurements:140 deg flexion passive, 45 deg passive ER @ 90 deg abd    RESTRICTIONS/PRECAUTIONS:  Gentle ROM; no weighted lifting    Exercises/Interventions:    Therapeutic Ex Sets/rep comments   UT stretch 30\" x 3    Levator stretch 30\" x 3         Cane press and flex 2# 10\" x 10    Cane ER 10\" x 10       GTB rows 2 x 15 +HEP   Supine shoulder flexion; long lever 2 x 10 1#, inc at next visit   Side lying ER 1# 2 x 10 x 3\" Cues for reaching neutral   Side lying abduction 1# 2 x 10   Eccentric lowering      Pulleys 4'    RTB ext 2 x 10 Inc at next visit   RTB tricep ext 2 x 10       No money YTB w/ scap squeeze 2 x 10 x 3\" Cues for scap   Standing flexion 2 x 10 Mirror for visual Problem: Discharge Planning  Goal: Discharge to home or other facility with appropriate resources  Outcome: Completed     Problem: Skin/Tissue Integrity  Goal: Absence of new skin breakdown  Description: 1. Monitor for areas of redness and/or skin breakdown  2. Assess vascular access sites hourly  3. Every 4-6 hours minimum:  Change oxygen saturation probe site  4. Every 4-6 hours:  If on nasal continuous positive airway pressure, respiratory therapy assess nares and determine need for appliance change or resting period.   Outcome: Completed     Problem: Safety - Adult  Goal: Free from fall injury  Outcome: Completed     Problem: Chronic Conditions and Co-morbidities  Goal: Patient's chronic conditions and co-morbidity symptoms are monitored and maintained or improved  Outcome: Completed     Problem: ABCDS Injury Assessment  Goal: Absence of physical injury  Outcome: Completed     Problem: Pain  Goal: Verbalizes/displays adequate comfort level or baseline comfort level  Outcome: Completed     Problem: Skin/Tissue Integrity - Adult  Goal: Skin integrity remains intact  Outcome: Completed     Problem: Musculoskeletal - Adult  Goal: Return ADL status to a safe level of function  Outcome: Completed     Problem: Infection - Adult  Goal: Absence of infection during hospitalization  Outcome: Completed

## 2022-06-14 NOTE — PROGRESS NOTES
Physical Therapy  Facility/Department: [facility] MED SURG  Daily Treatment Note  NAME: Arminda Rodrigues  : 1950  MRN: [id number]    Date of Service: 2022    Discharge Recommendations:  Patient would benefit from continued therapy after discharge,Therapy recommended at discharge   PT Equipment Recommendations  Equipment Needed: No (pt reports having a rollator)    Patient Diagnosis(es): The encounter diagnosis was Leg swelling. Assessment   Activity Tolerance: Patient tolerated treatment well  Equipment Needed: No (pt reports having a rollator)     Plan    Plan  Plan: 5-7 times per week  PT Plan of Care:  (5-7 times per week)     Restrictions  Restrictions/Precautions  Restrictions/Precautions: Fall Risk,General Precautions  Required Braces or Orthoses?: No  Implants present? : Metal implants (stu in back, stu in wrist , neck has screws)  Position Activity Restriction  Other position/activity restrictions: up with assist     Subjective    Subjective  Subjective: Pt seated in bedside chair upon arrival. MEENU krause. Pt is agreeable and cooperative with therapy  Orientation  Overall Orientation Status: Within Functional Limits  Cognition  Overall Cognitive Status: WFL     Objective   Bed Mobility Training  Bed Mobility Training: No  Balance  Sitting: Without support  Standing: With support  Transfer Training  Transfer Training: Yes  Sit to Stand: Stand-by assistance  Stand to Sit: Contact-guard assistance  Stand Pivot Transfers: Stand-by assistance  Gait Training  Gait Training: Yes  Gait  Overall Level of Assistance: Contact-guard assistance  Interventions: Verbal cues; Safety awareness training (To keep RW within KAREEM)  Step Length: Left lengthened;Right shortened  Stance: Left decreased;Right increased  Gait Abnormalities: Antalgic;Decreased step clearance; Path deviations  Distance (ft): 108 Feet  Assistive Device: Walker, rolling  Stairs - Level of Assistance: Contact-guard assistance  Number of Stairs Trained: 4 (FWD/Retro, Pt ascending with R LE , descending with L LE. Pt requiring B UE on RW. Pt informing writer of using door casing at home to navigate stairs, stair navigation this session focusing on utilizing RW to ascend stairs in retro and descend FWD d/t observed instability. Pt unsteady and apprehensive of this technique, but is understanding the possibility for needing to use this technique d/t lack of hand rails. Continued stair training necessary. )     PT Exercises  A/AROM Exercises: Pt demo's B LE pre -gait ex's x15            Goals  Short Term Goals  Time Frame for Short term goals: 5-7 days  Short term goal 1: Pt will tolerate one thirty minute therapeutic exercise session to show improvment in activity tolerance. Short term goal 2: Pt will improve one half MMT grade on L LE to show improvement in strength. Short term goal 3: Pt will be able to to half tandem stance with L LE posterior for 1-2 minutes to show ability to WB and maintain balance. Short term goal 4: Pt will be able to ambulate one 8\" step with no HR and least restrictive device and MinAx1 to be able to enter home. Short term goal 5: Pt will be able to ambulate 50-60ft with least restrictive device and CGAx1 to show improvement for distance walked. Additional Goals?: Yes  Short Term Goal 6: Pt will achieve a sit<>stand with CGAx1 with least restrictive device to show improvement in overall transfers. Patient Goals   Patient goals : to return home to spouse    Education  Patient Education  Education Given To: Patient  Education Provided: Energy Conservation; Fall Prevention Strategies; Home Exercise Program  Education Provided Comments: Pt educated in fall risk prevention and energy conservation in the home environment.    Education Method: Demonstration;Verbal;Teach Back  Barriers to Learning: None  Education Outcome: Verbalized understanding;Continued education needed    Safety measures utilized: Gait belt and Call light within reach , Sitting on toilet and educated on calling for help. Therapist discussing assist level with PCT post tx, when PCT finds pt up in restroom IND upon entering the room. Comment: Discussed safety concerns with RN Zahra Samaniego in regards to pt entering/exiting home and reinforced need for continued PT post d/c.      Therapy Time   Individual Concurrent Group Co-treatment   Time In 0940         Time Out [place], [place]

## 2022-06-14 NOTE — DISCHARGE SUMMARY
[place] [address] Internal Medicine    Discharge Summary     Patient ID: Peng Carson  :  1950   MRN: [id number]     ACCOUNT:  [de-identified]   Patient's PCP: Damon Lmaa MD  Admit Date: 2022   Discharge Date: 2022    Length of Stay: 9  Code Status:  Full Code  Admitting Physician: Shivam Schilling MD  Discharge Physician: Shivam Schilling MD     Active Discharge Diagnoses:     Primary Problem  Cellulitis of both lower extremities      [place] Problems    Diagnosis Date Noted    Cellulitis of both lower extremities [L03.115, L03.116] 2022     Priority: Medium    Stage 3 chronic kidney disease ([facility] [address] [N18.30] 2019    Type 2 diabetes mellitus with circulatory disorder, without long-term current use of insulin ([facility] [address] [E11.59] 2019       Admission Condition:  fair     Discharged Condition: fair    Hospital Stay:     Hospital Course:  Peng Carson is a 71 y.o. female who was admitted for the management of Cellulitis of both lower extremities , presented to ER with Leg Swelling  50-year-old lady class I obese BMI 32.88 history of uncontrolled diabetes mellitus chronic kidney disease stage III admitted with both lower extremity cellulitis cultures negative treated with IV antibiotics discharged on oral doxycycline on day of discharge patient's redness is all resolved no pain discharged to extended-care facility for further rehabilitation advised to follow-up with primary care physician within a week of discharge from [facility]  Her risk factor for lower extremity cellulitis is edema from a grade 1 diastolic congestive heart failure chronic  Advised to keep legs elevated Ace wraps  Patient also had chronic anemia hemoglobin 10.9-11.3 Baseline normocytic 95.3 advised for work-up outpatient with PCP    Significant therapeutic interventions:     Significant Diagnostic Studies:   Labs / Micro:        ,     Radiology:    CT Head WO Contrast    Result Date: 6/5/2022  EXAMINATION: CT OF THE HEAD WITHOUT CONTRAST  6/5/2022 2:31 pm TECHNIQUE: CT of the head was performed without the administration of intravenous contrast. Automated exposure control, iterative reconstruction, and/or weight based adjustment of the mA/kV was utilized to reduce the radiation dose to as low as reasonably achievable. COMPARISON: None. HISTORY: ORDERING SYSTEM PROVIDED HISTORY: fall, head injury TECHNOLOGIST PROVIDED HISTORY: fall, head injury Decision Support Exception - unselect if not a suspected or confirmed emergency medical condition->Emergency Medical Condition (MA) Reason for Exam: fall, head injury Additional signs and symptoms: Pt states fall with head injury a few days ago FINDINGS: BRAIN/VENTRICLES: There is no acute intracranial hemorrhage, mass effect or midline shift. No abnormal extra-axial fluid collection. The gray-white differentiation is maintained without evidence of an acute infarct. There is no evidence of hydrocephalus. ORBITS: The visualized portion of the orbits demonstrate no acute abnormality. SINUSES: The visualized paranasal sinuses and mastoid air cells demonstrate no acute abnormality. 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 SOFT TISSUES/SKULL:  No acute abnormality of the visualized skull or soft tissues. No acute intracranial abnormality. CT Cervical Spine WO Contrast    Result Date: 6/5/2022  EXAMINATION: CT OF THE CERVICAL SPINE WITHOUT CONTRAST 6/5/2022 11:33 am TECHNIQUE: CT of the cervical spine was performed without the administration of intravenous contrast. Multiplanar reformatted images are provided for review. Automated exposure control, iterative reconstruction, and/or weight based adjustment of the mA/kV was utilized to reduce the radiation dose to as low as reasonably achievable.  COMPARISON: 03/16/2022 HISTORY: ORDERING SYSTEM PROVIDED HISTORY: fall, neck pain TECHNOLOGIST PROVIDED HISTORY: fall, neck pain Decision Support Exception - unselect if not a suspected or confirmed emergency medical condition->Emergency Medical Condition (MA) Reason for Exam: fall, head injury Additional signs and symptoms: Pt states fall a few days ago and complains of neck pain FINDINGS: BONES/ALIGNMENT: There is stable E9-F5 posterior metallic fusion with associated laminectomies at this level. No evidence of instrumentation loosening or failure. Stable reversal of the normal cervical lordosis. No fracture or osseous destructive lesion. DEGENERATIVE CHANGES: Multilevel degenerative disc disease is noted in the cervical spine which is mild in severity. This is greatest at C6-C7. SOFT TISSUES: Vascular calcifications are noted along the aorta. The lung apices are clear. The thyroid gland is heterogeneous. There is a right-sided thyroid nodule that is low in attenuation measuring 4 mm, benign. No follow-up imaging is required. Vascular calcifications are noted in the carotid bulbs. No acute osseous abnormality of the cervical spine. No significant change from prior exam     XR CHEST PORTABLE    Result Date: 6/5/2022  EXAMINATION: ONE XRAY VIEW OF THE CHEST 6/5/2022 2:15 pm COMPARISON: April 8, 2022 HISTORY: ORDERING SYSTEM PROVIDED HISTORY: shortness of breath TECHNOLOGIST PROVIDED HISTORY: shortness of breath Reason for Exam: Shortness of breath FINDINGS: The lungs are without acute focal process. There is no effusion or pneumothorax. The cardiomediastinal silhouette is without acute process. The osseous structures are without acute process. No acute process.      VL Lower Extremity Bilateral Venous Duplex    Result Date: 6/5/2022    [facility]  Vascular Lower Extremities DVT Study Procedure   Patient Name   Orpha Elza       Date of Study           06/05/2022                 Kathy Middleton   Date of Birth  1950  Gender                  Female   Age            71 year(s)  Race                       Room Number    SANDIP   Corporate ID # [id number]   Patient Acct # [de-identified]   MR #           [id number]      Sonographer             Jordan Carnes   Accession #    [id number]  Interpreting Physician  Cody Jeff   Referring                  Referring Physician     Caio Valverde  Nurse  Practitioner  Procedure Type of Study:   Veins: Lower Extremities DVT Study, Venous Scan Lower Bilateral.  Indications for Study:Leg Swelling. Patient Status:ER. Technical Quality:Limited visualization. Limitation reason:swelling. Comments: Indications: leg swelling, wells score of 4. Patient indicates she is on Eliquis twice a day and has a history of cellulitis. Conclusions   Summary   No evidence of superficial or deep venous thrombosis in both lower  extremities. Signature   ----------------------------------------------------------------  Electronically signed by Jordan Carnes(Sonographer) on  06/05/2022 08:33 PM  ----------------------------------------------------------------   ----------------------------------------------------------------  Electronically signed by Margret Mares Reyes,Arthur(Interpreting  physician) on 06/05/2022 09:59 PM  ----------------------------------------------------------------  Findings:   Right Impression:                    Left Impression:   The common femoral, proximal         The common femoral, proximal femoral,  femoral, and popliteal veins         and popliteal veins demonstrate  demonstrate normal compressibility. normal compressibility. Normal compressibility of the great  Normal compressibility of the great  saphenous vein. saphenous vein. Normal compressibility of the small  Normal compressibility of the small  saphenous vein. saphenous vein.   Velocities are measured in cm/s ; Diameters are measured in cm Right Lower Extremities DVT Study Measurements Right 2D Measurements +------------------------------------+----------+---------------+----------+ ! Location                            ! Visualized! Compressibility! Thrombosis! +------------------------------------+----------+---------------+----------+ ! Common Femoral                      !Yes       ! Yes            ! None      ! +------------------------------------+----------+---------------+----------+ ! Prox Femoral                        !Yes       ! Yes            ! None      ! +------------------------------------+----------+---------------+----------+ ! Mid Femoral                         !No        !               !          ! +------------------------------------+----------+---------------+----------+ ! Dist Femoral                        !No        !               !          ! +------------------------------------+----------+---------------+----------+ ! Deep Femoral                        !No        !               !          ! +------------------------------------+----------+---------------+----------+ ! Popliteal                           !Yes       ! Yes            ! None      ! +------------------------------------+----------+---------------+----------+ ! Sapheno Femoral Junction            ! Yes       ! Yes            ! None      ! +------------------------------------+----------+---------------+----------+ ! PTV                                 ! Partial   !Yes            ! None      ! +------------------------------------+----------+---------------+----------+ ! Peroneal                            !No        !               !          ! +------------------------------------+----------+---------------+----------+ ! Gastroc                             ! Partial   !Yes            ! None      ! +------------------------------------+----------+---------------+----------+ ! GSV Thigh                           ! Yes       ! Yes            ! None      ! +------------------------------------+----------+---------------+----------+ ! GSV Knee !Yes       !Yes            ! None      ! +------------------------------------+----------+---------------+----------+ ! GSV Ankle                           ! Yes       ! Yes            ! None      ! +------------------------------------+----------+---------------+----------+ ! SSV                                 ! Yes       ! Yes            ! None      ! +------------------------------------+----------+---------------+----------+ Right Doppler Measurements +---------------------------+------+------+--------------------------------+ ! Location                   ! Signal!Reflux! Reflux (msec)                   ! +---------------------------+------+------+--------------------------------+ ! Common Femoral             !Phasic!      !                                ! +---------------------------+------+------+--------------------------------+ ! Prox Femoral               !Phasic!      !                                ! +---------------------------+------+------+--------------------------------+ ! Popliteal                  !Phasic!      !                                ! +---------------------------+------+------+--------------------------------+ Left Lower Extremities DVT Study Measurements Left 2D Measurements +------------------------------------+----------+---------------+----------+ ! Location                            ! Visualized! Compressibility! Thrombosis! +------------------------------------+----------+---------------+----------+ ! Common Femoral                      !Yes       ! Yes            ! None      ! +------------------------------------+----------+---------------+----------+ ! Prox Femoral                        !Yes       ! Yes            ! None      ! +------------------------------------+----------+---------------+----------+ ! Mid Femoral                         !No        !               !          ! +------------------------------------+----------+---------------+----------+ ! Dist Femoral !No        !               !          ! +------------------------------------+----------+---------------+----------+ ! Deep Femoral                        !No        !               !          ! +------------------------------------+----------+---------------+----------+ ! Popliteal                           !Yes       ! Yes            ! None      ! +------------------------------------+----------+---------------+----------+ ! Sapheno Femoral Junction            ! Yes       ! Yes            ! None      ! +------------------------------------+----------+---------------+----------+ ! PTV                                 ! Partial   !Yes            ! None      ! +------------------------------------+----------+---------------+----------+ ! Peroneal                            !No        !               !          ! +------------------------------------+----------+---------------+----------+ ! Gastroc                             ! Partial   !Yes            ! None      ! +------------------------------------+----------+---------------+----------+ ! GSV Thigh                           ! Yes       ! Yes            ! None      ! +------------------------------------+----------+---------------+----------+ ! GSV Knee                            ! Yes       ! Yes            ! None      ! +------------------------------------+----------+---------------+----------+ ! GSV Ankle                           ! Yes       ! Yes            ! None      ! +------------------------------------+----------+---------------+----------+ ! SSV                                 ! Yes       ! Yes            ! None      ! +------------------------------------+----------+---------------+----------+ Left Doppler Measurements +---------------------------+------+------+--------------------------------+ ! Location                   ! Signal!Reflux! Reflux (msec)                   ! +---------------------------+------+------+--------------------------------+ ! Common Femoral !Phasic! No    !                                ! +---------------------------+------+------+--------------------------------+ ! Prox Femoral               !Phasic!      !                                ! +---------------------------+------+------+--------------------------------+ ! Popliteal                  !Phasic!      !                                ! +---------------------------+------+------+--------------------------------+        Consultations:    Consults:     Final Specialist Recommendations/Findings:   PHARMACY TO DOSE VANCOMYCIN  IP CONSULT TO SOCIAL WORK      The patient was seen and examined on day of discharge and this discharge summary is in conjunction with any daily progress note from day of discharge.     Discharge plan:     Disposition: [facility]    Physician Follow Up:     Lex Cowden, MD  [phone]  [phone]    Call on 7/20/2022  @2:15 pm    [facility]  [address] [address]  [phone]           Requiring Further Evaluation/Follow Up POST HOSPITALIZATION/Incidental Findings:    Diet: cardiac diet    Activity: As tolerated    Instructions to Patient:     Discharge Medications:      Medication List      START taking these medications    doxycycline monohydrate 100 MG capsule  Commonly known as: MONODOX  Take 1 capsule by mouth every 12 hours for 12 doses     traZODone 50 MG tablet  Commonly known as: DESYREL  Take 1 tablet by mouth nightly as needed for Sleep        CHANGE how you take these medications    cyanocobalamin 1000 MCG tablet  Commonly known as: [employer] VITAMIN B12  Take 1 tablet by mouth daily  What changed: when to take this        CONTINUE taking these medications    acetaminophen 325 MG tablet  Commonly known as: TYLENOL  Take 2 tablets by mouth every 4 hours as needed for Pain     albuterol-ipratropium  MCG/ACT Aers inhaler  Commonly known as: COMBIVENT RESPIMAT  Inhale 1 puff into the lungs every 6 hours as needed for Wheezing or Shortness of Breath     amLODIPine 5 MG tablet  Commonly known as: Norvasc  Take 1 tablet by mouth daily     apixaban 5 MG Tabs tablet  Commonly known as: Eliquis  Take 1 tablet by mouth 2 times daily     atorvastatin 40 MG tablet  Commonly known as: LIPITOR  Take 1 tablet by mouth nightly     blood glucose test strips  Test 2 times a day & as needed for symptoms of irregular blood glucose. busPIRone 5 MG tablet  Commonly known as: BUSPAR  Take 1 tablet by mouth 3 times daily     Calcium 500/D 500-125 MG-UNIT Tabs  Generic drug: Calcium Carbonate-Vitamin D     carvedilol 12.5 MG tablet  Commonly known as: COREG  Take 1 tablet by mouth 2 times daily     citalopram 20 MG tablet  Commonly known as: CELEXA  Take 1 tablet by mouth daily     fenofibrate micronized 134 MG capsule  Commonly known as: LOFIBRA  Take 1 capsule by mouth every morning (before breakfast)     ferrous sulfate 325 (65 Fe) MG tablet  Commonly known as: IRON 325  Take 1 tablet by mouth 2 times daily     furosemide 20 MG tablet  Commonly known as: LASIX  Take 1 tablet by mouth daily     gabapentin 100 MG capsule  Commonly known as: NEURONTIN  Take 2 capsules by mouth 2 times daily for 30 days.      Lancets Misc  1 each by Does not apply route daily     melatonin 3 MG Tabs tablet  Take 2 tablets by mouth nightly as needed (insomnia)     nitroGLYCERIN 0.4 MG SL tablet  Commonly known as: Nitrostat  Place 1 tablet under the tongue every 5 minutes as needed for Chest pain     pramipexole 0.5 MG tablet  Commonly known as: Mirapex  Take 1 tablet by mouth nightly     VITAMIN D (ERGOCALCIFEROL) PO        STOP taking these medications    losartan 100 MG tablet  Commonly known as: COZAAR           Where to Get Your Medications      [facility] [address] [facility] [phone] Gisel Thomas [phone] [address] [facility] [address]    Phone: [phone]   · doxycycline monohydrate 100 MG capsule  · traZODone 50 MG tablet     You can get these medications from any pharmacy    Bring a paper prescription for each of these medications  · gabapentin 100 MG capsule         Time Spent on discharge is  35 mins in patient examination, evaluation, counseling as well as medication reconciliation, prescriptions for required medications, discharge plan and follow up. Electronically signed by   Shaq Viveros MD  6/14/2022  1:17 PM      Thank you Dr. Janay Rooney MD for the opportunity to be involved in this patient's care.

## 2022-06-14 NOTE — PROGRESS NOTES
[place] [address] Internal Medicine    Progress Note     6/14/2022    1:16 PM    Name:   Arminda Rodrigues  MRN:     [id number]     Acct:      [de-identified]   Room:   2064/2064-01   Day:  9  Admit Date:  6/5/2022  1:47 PM    PCP:   Augustina Goncalves MD  Code Status:  Full Code    Subjective:     C/C:   Chief Complaint   Patient presents with    Leg Swelling     Principal Problem:    Cellulitis of both lower extremities  Active Problems:    Stage 3 chronic kidney disease ([facility] [address]    Type 2 diabetes mellitus with circulatory disorder, without long-term current use of insulin ([facility] [address]  Resolved Problems:    * No resolved hospital problems. *      6/11/22  No acute issues overnight. Patient working with PT/OT  Waiting for pre-CERT. Discharge planning in process             Significant last 24 hr data reviewed ;   Vitals:    06/13/22 0707 06/13/22 1216 06/13/22 1802 06/14/22 0646   BP: (!) 148/81 132/67 131/67 (!) 126/91   Pulse: 66 64 68 66   Resp: 16 16 20 18   Temp: 97.7 °F (36.5 °C) 97.3 °F (36.3 °C) 97.7 °F (36.5 °C) 97.5 °F (36.4 °C)   TempSrc:  Oral Oral    SpO2: 97% 98% 97% 92%   Weight:       Height:          Recent Results (from the past 24 hour(s))   CBC    Collection Time: 06/14/22  5:55 AM   Result Value Ref Range    WBC 5.1 3.5 - 11.0 k/uL    RBC 3.53 (L) 4.0 - 5.2 m/uL    Hemoglobin 11.3 (L) 12.0 - 16.0 g/dL    Hematocrit 33.7 (L) 36 - 46 %    MCV 95.3 80 - 100 fL    MCH 31.9 26 - 34 pg    MCHC 33.4 31 - 37 g/dL    RDW 13.9 11.5 - 14.9 %    Platelets 160 630 - 877 k/uL    MPV 7.2 6.0 - 12.0 fL     No results for input(s): POCGLU in the last 72 hours.    CT Head WO Contrast    Result Date: 6/5/2022  EXAMINATION: CT OF THE HEAD WITHOUT CONTRAST  6/5/2022 2:31 pm TECHNIQUE: CT of the head was performed without the administration of intravenous contrast. Automated exposure control, iterative reconstruction, and/or weight based adjustment of the mA/kV was utilized to reduce the radiation dose There is stable X3-K1 posterior metallic fusion with associated laminectomies at this level. No evidence of instrumentation loosening or failure. Stable reversal of the normal cervical lordosis. No fracture or osseous destructive lesion. DEGENERATIVE CHANGES: Multilevel degenerative disc disease is noted in the cervical spine which is mild in severity. This is greatest at C6-C7. SOFT TISSUES: Vascular calcifications are noted along the aorta. The lung apices are clear. The thyroid gland is heterogeneous. There is a right-sided thyroid nodule that is low in attenuation measuring 4 mm, benign. No follow-up imaging is required. Vascular calcifications are noted in the carotid bulbs. No acute osseous abnormality of the cervical spine. No significant change from prior exam     XR CHEST PORTABLE    Result Date: 6/5/2022  EXAMINATION: ONE XRAY VIEW OF THE CHEST 6/5/2022 2:15 pm COMPARISON: April 8, 2022 HISTORY: ORDERING SYSTEM PROVIDED HISTORY: shortness of breath TECHNOLOGIST PROVIDED HISTORY: shortness of breath Reason for Exam: Shortness of breath FINDINGS: The lungs are without acute focal process. There is no effusion or pneumothorax. The cardiomediastinal silhouette is without acute process. The osseous structures are without acute process. No acute process.      VL Lower Extremity Bilateral Venous Duplex    Result Date: 6/5/2022    [facility]  Vascular Lower Extremities DVT Study Procedure   Patient Name   Nadya Haver       Date of Study           06/05/2022                 [address]   Date of Birth  1950  Gender                  Female   Age            71 year(s)  Race                       Room Number    SANDIP   Corporate ID # [id number]   Patient Acct # [de-identified]   MR #           [id number]      Sonographer             Jordan Carnes   Accession #    [id number]  Interpreting Physician  Carlos Soto   Referring                  Referring Physician     Kim Vanegas  Nurse Practitioner  Procedure Type of Study:   Veins: Lower Extremities DVT Study, Venous Scan Lower Bilateral.  Indications for Study:Leg Swelling. Patient Status:ER. Technical Quality:Limited visualization. Limitation reason:swelling. Comments: Indications: leg swelling, wells score of 4. Patient indicates she is on Eliquis twice a day and has a history of cellulitis. Conclusions   Summary   No evidence of superficial or deep venous thrombosis in both lower  extremities. Signature   ----------------------------------------------------------------  Electronically signed by Jordan Carnes(Sonographer) on  06/05/2022 08:33 PM  ----------------------------------------------------------------   ----------------------------------------------------------------  Electronically signed by Alva Skeens Reyes,Arthur(Interpreting  physician) on 06/05/2022 09:59 PM  ----------------------------------------------------------------  Findings:   Right Impression:                    Left Impression:   The common femoral, proximal         The common femoral, proximal femoral,  femoral, and popliteal veins         and popliteal veins demonstrate  demonstrate normal compressibility. normal compressibility. Normal compressibility of the great  Normal compressibility of the great  saphenous vein. saphenous vein. Normal compressibility of the small  Normal compressibility of the small  saphenous vein. saphenous vein. Velocities are measured in cm/s ; Diameters are measured in cm Right Lower Extremities DVT Study Measurements Right 2D Measurements +------------------------------------+----------+---------------+----------+ ! Location                            ! Visualized! Compressibility! Thrombosis! +------------------------------------+----------+---------------+----------+ ! Common Femoral                      !Yes       ! Yes            ! None      ! +------------------------------------+----------+---------------+----------+ ! Prox Femoral                        !Yes       ! Yes            ! None      ! +------------------------------------+----------+---------------+----------+ ! Mid Femoral                         !No        !               !          ! +------------------------------------+----------+---------------+----------+ ! Dist Femoral                        !No        !               !          ! +------------------------------------+----------+---------------+----------+ ! Deep Femoral                        !No        !               !          ! +------------------------------------+----------+---------------+----------+ ! Popliteal                           !Yes       ! Yes            ! None      ! +------------------------------------+----------+---------------+----------+ ! Sapheno Femoral Junction            ! Yes       ! Yes            ! None      ! +------------------------------------+----------+---------------+----------+ ! PTV                                 ! Partial   !Yes            ! None      ! +------------------------------------+----------+---------------+----------+ ! Peroneal                            !No        !               !          ! +------------------------------------+----------+---------------+----------+ ! Gastroc                             ! Partial   !Yes            ! None      ! +------------------------------------+----------+---------------+----------+ ! GSV Thigh                           ! Yes       ! Yes            ! None      ! +------------------------------------+----------+---------------+----------+ ! GSV Knee                            ! Yes       ! Yes            ! None      ! +------------------------------------+----------+---------------+----------+ ! GSV Ankle                           ! Yes       ! Yes            ! None      ! +------------------------------------+----------+---------------+----------+ ! SSV !Yes       !Yes            ! None      ! +------------------------------------+----------+---------------+----------+ Right Doppler Measurements +---------------------------+------+------+--------------------------------+ ! Location                   ! Signal!Reflux! Reflux (msec)                   ! +---------------------------+------+------+--------------------------------+ ! Common Femoral             !Phasic!      !                                ! +---------------------------+------+------+--------------------------------+ ! Prox Femoral               !Phasic!      !                                ! +---------------------------+------+------+--------------------------------+ ! Popliteal                  !Phasic!      !                                ! +---------------------------+------+------+--------------------------------+ Left Lower Extremities DVT Study Measurements Left 2D Measurements +------------------------------------+----------+---------------+----------+ ! Location                            ! Visualized! Compressibility! Thrombosis! +------------------------------------+----------+---------------+----------+ ! Common Femoral                      !Yes       ! Yes            ! None      ! +------------------------------------+----------+---------------+----------+ ! Prox Femoral                        !Yes       ! Yes            ! None      ! +------------------------------------+----------+---------------+----------+ ! Mid Femoral                         !No        !               !          ! +------------------------------------+----------+---------------+----------+ ! Dist Femoral                        !No        !               !          ! +------------------------------------+----------+---------------+----------+ ! Deep Femoral                        !No        !               !          ! +------------------------------------+----------+---------------+----------+ ! Popliteal !Yes       !Yes            ! None      ! +------------------------------------+----------+---------------+----------+ ! Sapheno Femoral Junction            ! Yes       ! Yes            ! None      ! +------------------------------------+----------+---------------+----------+ ! PTV                                 ! Partial   !Yes            ! None      ! +------------------------------------+----------+---------------+----------+ ! Peroneal                            !No        !               !          ! +------------------------------------+----------+---------------+----------+ ! Gastroc                             ! Partial   !Yes            ! None      ! +------------------------------------+----------+---------------+----------+ ! GSV Thigh                           ! Yes       ! Yes            ! None      ! +------------------------------------+----------+---------------+----------+ ! GSV Knee                            ! Yes       ! Yes            ! None      ! +------------------------------------+----------+---------------+----------+ ! GSV Ankle                           ! Yes       ! Yes            ! None      ! +------------------------------------+----------+---------------+----------+ ! SSV                                 ! Yes       ! Yes            ! None      ! +------------------------------------+----------+---------------+----------+ Left Doppler Measurements +---------------------------+------+------+--------------------------------+ ! Location                   ! Signal!Reflux! Reflux (msec)                   ! +---------------------------+------+------+--------------------------------+ ! Common Femoral             !Phasic! No    !                                ! +---------------------------+------+------+--------------------------------+ ! Prox Femoral               !Phasic!      !                                ! +---------------------------+------+------+--------------------------------+ ! Popliteal !Phasic!      !                                ! +---------------------------+------+------+--------------------------------+          On admission     The patient is a 71 y.o.  female, with a history of anemia, spondylolisthesis, DVT, chronic diastolic heart failure, CVA, C. difficile, HTN, CKD stage III, peptic ulcer disease, and DM type II, who presents with leg swelling. According to patient and her , legs have been increasingly edematous with progressive erythema over the past 2 weeks. Patient was admitted to this facility in March for frequent falls and found to have DVT. Patient reports that she was concerned for recurrent DVT, despite being compliant with Eliquis. Following inpatient treatment, patient received intensive therapy and acute rehab department but reports having 5 falls since that time. Patient has a history of left sided weakness from old CVA.  thinks falls are due to poor technique with transfers d/t fear of falling, plus edema makes it difficult to lift legs to walk. Symptoms are associated with painful hematoma on posterior scalp from recent fall. Patient also reports dry non-productive cough, which is residual from recent bronchitis. Denies fever, chills, chest pain, abdominal pain, nausea, vomiting, diarrhea, and urinary symptoms. There are no aggravating or alleviating factors. Symptoms are reported as constant and moderate to severe; progressively worsening. Review of Systems:     Constitutional: Negative for chills, diaphoresis and fever. HENT: Negative for congestion and sore throat. Respiratory: Positive for cough (dry, nonproductive). Negative for shortness of breath and wheezing. Cardiovascular: Positive for leg swelling (reports weeping edema). Negative for chest pain and palpitations. Gastrointestinal: Negative for abdominal pain, constipation, diarrhea, nausea and vomiting.    Genitourinary: Negative for dysuria, frequency and urgency. Musculoskeletal: Negative for back pain and myalgias. Skin: Positive for color change (erythema, BLE). Negative for rash. Neurological: Positive for weakness (generalized weakness) and headaches (pain on posterior scalp from recent fall. ). Negative for dizziness. Psychiatric/Behavioral: The patient is not nervous/anxious. Data:     Past Medical History: No change     Social History: No change    Family History: @no change    Vitals:  Reviewed    I/O (24Hr): No intake or output data in the 24 hours ending 22 1316  Labs:  CBC from today. Creatine from today. Radiology:  N/A    Medications:     Current Meds:   Scheduled Meds:    furosemide  20 mg Oral Daily    amLODIPine  5 mg Oral Daily    apixaban  5 mg Oral BID    atorvastatin  40 mg Oral Nightly    busPIRone  5 mg Oral TID    calcium carb-cholecalciferol  1 tablet Oral Nightly    carvedilol  12.5 mg Oral BID    citalopram  20 mg Oral Daily    cyanocobalamin  1,000 mcg Oral Nightly    fenofibrate  160 mg Oral Daily    ferrous sulfate  325 mg Oral BID    gabapentin  200 mg Oral BID    pramipexole  0.5 mg Oral Nightly    sodium chloride flush  5-40 mL IntraVENous 2 times per day     Continuous Infusions:    sodium chloride 20 mL/hr at 22 2322     PRN Meds: traZODone, melatonin, sodium chloride flush, sodium chloride, potassium chloride **OR** potassium alternative oral replacement **OR** potassium chloride, magnesium sulfate, ondansetron **OR** ondansetron, polyethylene glycol, acetaminophen **OR** acetaminophen    Physical Examination:        BP (!) 126/91   Pulse 66   Temp 97.5 °F (36.4 °C)   Resp 18   Ht 5' 3\" (1.6 m)   Wt 185 lb 10 oz (84.2 kg)   SpO2 92%   BMI 32.88 kg/m²   Temp (24hrs), Av.6 °F (36.4 °C), Min:97.5 °F (36.4 °C), Max:97.7 °F (36.5 °C)    No results for input(s): POCGLU in the last 72 hours.   No intake or output data in the 24 hours ending 22 1316  Constitutional: General: She is not in acute distress. Appearance: She is well-developed. She is not diaphoretic. HENT:      Head: Normocephalic and atraumatic. Eyes:      Conjunctiva/sclera: Conjunctivae normal.      Pupils: Pupils are equal, round, and reactive to light. Neck:      Trachea: No tracheal deviation. Cardiovascular:      Rate and Rhythm: Normal rate and regular rhythm. Heart sounds: Normal heart sounds. No murmur heard. No friction rub. No gallop. Pulmonary:      Effort: Pulmonary effort is normal. No respiratory distress. Breath sounds: Normal breath sounds. No wheezing or rales. Chest:      Chest wall: No tenderness. Abdominal:      General: Bowel sounds are normal. There is no distension. Palpations: Abdomen is soft. Tenderness: There is no abdominal tenderness. There is no guarding. Musculoskeletal:         General: No tenderness. Normal range of motion. Cervical back: Normal range of motion and neck supple. Right lower leg: Edema (1+) present. Left lower leg: Edema (1+) present. Comments: Patient with 1+ pitting edema bilateral lower extremities that extends up your groin. Bilateral lower legs are mildly erythematous with increased warmth. There are multiple small scabbed areas on bilateral shins. Lymphadenopathy:      Cervical: No cervical adenopathy. Skin:     General: Skin is warm and dry. Coloration: Skin is not pale. Findings: Erythema (BLE) present. No rash. Neurological:      Mental Status: She is alert and oriented to person, place, and time. Motor: No seizure activity. Coordination: Coordination normal.   Psychiatric:         Behavior: Behavior normal.         Thought Content:  Thought content normal.     No lower extremity calf tenderness bilaterally   Redness/Induration of anterior legs bilaterally reduced     Assessment:        Primary Problem  Cellulitis of both lower extremities    Principal Problem: Cellulitis of both lower extremities  Active Problems:    Stage 3 chronic kidney disease (HCC)    Type 2 diabetes mellitus with circulatory disorder, without long-term current use of insulin (HCC)  Resolved Problems:    * No resolved hospital problems. *     Plan:                            Progress ok  Await precert for placement                 Chidi Rodgers MD  PGY-3 Internal Medicine Resident  [address] [place] [address]  6/14/2022 1:16 PM    Attending Physician Statement  I have discussed the care of Velasquez Farfan and I have examined the patient myselft and taken ros and hpi , including pertinent history and exam findings,  with the resident. I have reviewed the key elements of all parts of the encounter with the resident. I agree with the assessment, plan and orders as documented by the resident.       Electronically signed by Mercedes Patel MD

## 2022-06-16 ENCOUNTER — TELEPHONE (OUTPATIENT)
Dept: INTERNAL MEDICINE CLINIC | Age: 72
End: 2022-06-16

## 2022-06-16 NOTE — TELEPHONE ENCOUNTER
[facility] Initial Follow Up Call    Outreach made within 2 business days of discharge: Yes    Patient: Kate Villanueva Patient : 1950   MRN: [id number]  Reason for Admission: There are no discharge diagnoses documented for the most recent discharge.   Discharge Date: 22       Spoke with: left message     Discharge department/facility: [address] Interactive Patient Contact:    Scheduled appointment with PCP within 7-14 days    Follow Up  Future Appointments   Date Time Provider Britni Marshall   2022 12:00 PM Will Keyes MD Neuro Spec [facility]   2022 11:00 AM Ty Luna   2022  2:15 PM Rita Laura MD [address]   2022 10:00 AM DO Lisy Aparicio Neuro [place], [place]

## 2022-06-26 NOTE — PROGRESS NOTES
25-Jun-2022 09:44 gross ataxia  Gait Deviations: Slow Alicia, Staggers  Distance: amb 25 ft with a RW x min assist  Comments: Pt demonstrates one LOB noted requiring modA from writer to correct LOB. Surface: level tile  Ambulation 1  Surface: level tile  Device: Rolling Walker  Assistance: Minimal assistance  Quality of Gait: Decreased step length bilaterally; RLE buckling with weight acceptance; significant LLE extension throughout; gross ataxia  Gait Deviations: Slow Alicia, Staggers  Distance: amb 25 ft with a RW x min assist  Comments: Pt demonstrates one LOB noted requiring modA from writer to correct LOB. OT:  ADL  Feeding: Modified independent   Grooming: Setup, Contact guard assistance, Increased time to complete (dynamic standing at the sink w/ RW)  UE Bathing: Setup, Increased time to complete, Stand by assistance (seated on toilet)  LE Bathing: Setup, Stand by assistance, Increased time to complete (seated on toilet for B LE, CGA for backside/caty care)  UE Dressing: Setup, Minimal assistance  LE Dressing: Maximum assistance  Toileting: Setup, Minimal assistance, Moderate assistance  Additional Comments: pt completed ADLs and used sx soap for UB/LB bathing. pt had c-collar on during the session. Balance  Sitting Balance: Stand by assistance  Standing Balance: Contact guard assistance (/min A w/ RW)   Standing Balance  Time: stood ~ 8-10 min  Activity: dynamic standing for simple grooming, dynamic standing for caty care/backside mgt, bathroom <>recliner transfer   Comment: pt used RW for support/safety, pt's L LE demo decreased speed/coordination noted which pt reports pt has been dealing with for awhiled        Bed mobility  Supine to Sit: Moderate assistance  Sit to Supine: Moderate assistance  Scooting: Moderate assistance  Comment: Mod A x 2 persosns with B LE progression and trunk progression. Increased time and effort.   Transfers  Stand Step Transfers: Contact guard assistance (/ min A w/RW)  Stand Pivot Transfers: Minimal assistance, 2 Person assistance  Sit to stand: Moderate assistance  Stand to sit: Moderate assistance  Transfer Comments: verbal/tactile cues for sequencing transfers- good return. Toilet Transfers  Toilet - Technique: Ambulating  Equipment Used: Raised toilet seat with rails  Toilet Transfer:  Moderate assistance             SLP:      Current Medications:   Current Facility-Administered Medications: enoxaparin (LOVENOX) injection 40 mg, 40 mg, Subcutaneous, Daily  amLODIPine (NORVASC) tablet 5 mg, 5 mg, Oral, Daily  atorvastatin (LIPITOR) tablet 40 mg, 40 mg, Oral, Nightly  calcium carbonate-vitamin D3 (CALTRATE) 600-400 MG-UNIT per tab 1 tablet, 1 tablet, Oral, BID  carvedilol (COREG) tablet 12.5 mg, 12.5 mg, Oral, BID  citalopram (CELEXA) tablet 20 mg, 20 mg, Oral, Daily  vitamin B-12 (CYANOCOBALAMIN) tablet 1,000 mcg, 1,000 mcg, Oral, Daily  fenofibrate (TRICOR) tablet 134 mg, 134 mg, Oral, QAM AC  ferrous sulfate (FE TABS 325) EC tablet 325 mg, 325 mg, Oral, BID  furosemide (LASIX) tablet 40 mg, 40 mg, Oral, BID  lisinopril (PRINIVIL;ZESTRIL) tablet 30 mg, 30 mg, Oral, Daily  methocarbamol (ROBAXIN) tablet 750 mg, 750 mg, Oral, 4x Daily  pantoprazole (PROTONIX) tablet 40 mg, 40 mg, Oral, BID AC  alogliptin (NESINA) tablet 12.5 mg, 12.5 mg, Oral, Daily  sodium chloride flush 0.9 % injection 10 mL, 10 mL, Intravenous, 2 times per day  0.9 % sodium chloride infusion, , Intravenous, Continuous  acetaminophen (TYLENOL) tablet 650 mg, 650 mg, Oral, Q6H  oxyCODONE (ROXICODONE) immediate release tablet 5 mg, 5 mg, Oral, Q4H PRN **OR** oxyCODONE (ROXICODONE) immediate release tablet 10 mg, 10 mg, Oral, Q4H PRN  polyethylene glycol (GLYCOLAX) packet 17 g, 17 g, Oral, Daily  bisacodyl (DULCOLAX) EC tablet 5 mg, 5 mg, Oral, Daily PRN  senna (SENOKOT) 8.8 MG/5ML syrup 8.8 mg, 5 mL, Oral, BID PRN  ondansetron (ZOFRAN) injection 4 mg, 4 mg, Intravenous, Q6H PRN    Objective:  BP (!) 144/57   Pulse 74   Temp 99 °F (37.2 °C) (Oral)   Resp 18   Ht 5' 3\" (1.6 m)   Wt 170 lb (77.1 kg)   SpO2 98%   BMI 30.11 kg/m²       GEN: Well developed, well nourished, no acute distress  HEENT: NCAT. EOM grossly intact. Hearing grossly intact. Mucous membranes pink and moist.  RESP: Normal breath sounds with no wheezing, rales, or rhonchi. Respirations WNL and unlabored. CV: Regular rate and rhythm. No murmurs, rubs, or gallops. ABD: Soft, non-distended, BS+ and equal.  NEURO: Alert. Speech fluent. Sensation to light touch intact. MSK: Muscle tone and bulk are normal bilaterally. Strength 4/5 in all limbs. LIMBS: No edema in bilateral lower limbs. SKIN: Warm and dry with good turgor. PSYCH: Mood WNL. Affect WNL. Appropriately interactive. Diagnostics:     CBC:   Recent Labs     05/22/21  0514   WBC 15.1*   RBC 3.28*   HGB 10.4*   HCT 33.1*   .9   RDW 13.2        BMP: No results for input(s): NA, K, CL, CO2, PHOS, BUN, CREATININE in the last 72 hours. Invalid input(s): CA  BNP: No results for input(s): BNP in the last 72 hours. PT/INR: No results for input(s): PROTIME, INR in the last 72 hours. APTT: No results for input(s): APTT in the last 72 hours. CARDIAC ENZYMES: No results for input(s): CKMB, CKMBINDEX, TROPONINT in the last 72 hours. Invalid input(s): CKTOTAL;3  FASTING LIPID PANEL:  Lab Results   Component Value Date    CHOL 103 05/20/2019    HDL 74 03/12/2021    TRIG 66 05/20/2019     LIVER PROFILE: No results for input(s): AST, ALT, ALB, BILIDIR, BILITOT, ALKPHOS in the last 72 hours. Imaging:  CT cervical spine, 5/22/21:  Impression   1. Status post interval C3-C6 posterior spinal fusion and decompression.  The   hardware is intact.  Postoperative soft tissue gas and fluid in the   paraspinal soft tissues.  No focal fluid collection. 2. Minimal anterolisthesis of C3 on C4 measures 1-2 mm.    3. Moderate C5-C6 degenerative disc disease.  Mild degenerative disc disease

## 2022-06-27 ENCOUNTER — CARE COORDINATION (OUTPATIENT)
Dept: CASE MANAGEMENT | Age: 72
End: 2022-06-27

## 2022-06-27 DIAGNOSIS — F32.A DEPRESSION, UNSPECIFIED DEPRESSION TYPE: ICD-10-CM

## 2022-06-27 DIAGNOSIS — L03.115 CELLULITIS OF BOTH LOWER EXTREMITIES: Primary | ICD-10-CM

## 2022-06-27 DIAGNOSIS — L03.116 CELLULITIS OF BOTH LOWER EXTREMITIES: Primary | ICD-10-CM

## 2022-06-27 PROCEDURE — 1111F DSCHRG MED/CURRENT MED MERGE: CPT | Performed by: INTERNAL MEDICINE

## 2022-06-27 RX ORDER — CITALOPRAM 20 MG/1
20 TABLET ORAL DAILY
Qty: 30 TABLET | Refills: 0 | Status: SHIPPED | OUTPATIENT
Start: 2022-06-27 | End: 2022-10-03 | Stop reason: SDUPTHER

## 2022-06-27 NOTE — TELEPHONE ENCOUNTER
Start date- 4/10/22  LOV- 4/20/22 with Dr. Kina Mayen  Next visit- 7/20/22   Celexa pended, would you like to refill?

## 2022-06-27 NOTE — CARE COORDINATION
anyone. Non-face-to-face services provided:      Care Transitions 24 Hour Call    Do you have a copy of your discharge instructions?: Yes  Do you have all of your prescriptions and are they filled?: Yes (Comment: waiting for a couple from her mail order pharmacy)  Have you been contacted by a [address]?: No  Have you scheduled your follow up appointment?: Yes (Comment: PCP 22)  Post Acute Services: [address] [place] [address] [place] (Comment: [facility])  Patient DME: Toribio Sample, Other  Other Patient DME: grab bars  Do you have support at home?: Partner/Spouse/SO  Do you feel like you have everything you need to keep you well at home?: Yes  Are you an active caregiver in your home?: No  Care Transitions Interventions     Other Services:  (Comment: set up vns/OL)          Follow Up  Future Appointments   Date Time Provider Britni Marshall   2022  8:40 AM Stephon Shea MD Neuro Spec [facility]   2022 11:00 AM Hao Berman MD [address]   7/15/2022  3:00 PM Martin Bedolla MD [address]   2022 10:00 AM Alicia Dale DO [facility]     Transitions of Care Initial Call    Was this an external facility discharge? No Discharge Facility:     Challenges to be reviewed by the provider   Additional needs identified to be addressed with provider: Yes  none             Method of communication with provider : none    Advance Care Planning:   Does patient have an Advance Directive: reviewed and current. Care Transition Nurse contacted the patient by telephone to perform post hospital discharge assessment. Verified name and  with patient as identifiers. Provided introduction to self, and explanation of the CTN role. CTN reviewed discharge instructions, medical action plan and red flags with patient who verbalized understanding. Patient given an opportunity to ask questions and does not have any further questions or concerns at this time.  Were discharge instructions available to patient? Yes. Reviewed appropriate site of care based on symptoms and resources available to patient including: PCP  When to call 911. The patient agrees to contact the PCP office for questions related to their healthcare. Medication reconciliation was performed with patient, who verbalizes understanding of administration of home medications. Advised obtaining a 90-day supply of all daily and as-needed medications. Was patient discharged with a pulse oximeter? no    CTN provided contact information. Plan for follow-up call in 1-2 days based on severity of symptoms and risk factors.   Plan for next call: follow up appointment-Confirm appointment  medication management-obtain Doxycycline  community resources- obtain [facility] information      Ashley Higgins RN

## 2022-06-28 ENCOUNTER — CARE COORDINATION (OUTPATIENT)
Dept: CASE MANAGEMENT | Age: 72
End: 2022-06-28

## 2022-06-28 NOTE — CARE COORDINATION
[facility] Transitions Follow Up Call    2022    Patient: Aayush Ramirez  Patient : 1950   MRN: [id number]  Reason for Admission: Leg swelling  Discharge Date: 22 RARS: Readmission Risk Score: 19.3 ( )    Attempted to contact Andreinain at [facility] steve Castillo twice without success to find out if [facility] was set up. Writer called patient. [facility] Discussed appointment moved  . Not changed in Epic. Will confirm with Madelyn. She scheduled appointment. Spoke with: Denia Morton [id number] Transitions Subsequent and Final Call    Subsequent and Final Calls  Care Transitions Interventions  Other Interventions:            Follow Up  Future Appointments   Date Time Provider Britni Marshall   2022  8:40 AM Es Manuel MD Neuro Spec [address] [facility]   2022 11:00 AM Ty Bautista   7/15/2022  3:00 PM Bell Billings MD [address]   2022 10:00 AM DO Lisy Bruce Neuro Marlene Greene RN

## 2022-06-29 ENCOUNTER — TELEPHONE (OUTPATIENT)
Dept: INTERNAL MEDICINE CLINIC | Age: 72
End: 2022-06-29

## 2022-06-29 ENCOUNTER — CARE COORDINATION (OUTPATIENT)
Dept: CASE MANAGEMENT | Age: 72
End: 2022-06-29

## 2022-06-29 ENCOUNTER — CARE COORDINATION (OUTPATIENT)
Dept: CARE COORDINATION | Age: 72
End: 2022-06-29

## 2022-06-29 DIAGNOSIS — I50.32 CHRONIC DIASTOLIC HEART FAILURE (HCC): ICD-10-CM

## 2022-06-29 DIAGNOSIS — R22.43 LOCALIZED SWELLING OF BOTH LOWER LEGS: ICD-10-CM

## 2022-06-29 RX ORDER — TRAZODONE HYDROCHLORIDE 50 MG/1
TABLET ORAL
Qty: 30 TABLET | Refills: 0 | Status: SHIPPED | OUTPATIENT
Start: 2022-06-29 | End: 2022-07-30

## 2022-06-29 RX ORDER — FUROSEMIDE 20 MG/1
20 TABLET ORAL DAILY
Qty: 30 TABLET | Refills: 0 | Status: SHIPPED | OUTPATIENT
Start: 2022-06-29 | End: 2022-10-20 | Stop reason: SDUPTHER

## 2022-06-29 NOTE — TELEPHONE ENCOUNTER
----- Message from Bernardo Ramirez sent at 6/29/2022 11:46 AM EDT -----  Subject: Message to Provider    QUESTIONS  Information for Provider? Patient was returning [place] call to let her know   that she doing ok.   ---------------------------------------------------------------------------  --------------  [address]  What is the best way for the office to contact you? OK to leave message on   voicemail  Preferred Call Back Phone Number?  [phone]  ---------------------------------------------------------------------------  --------------  SCRIPT ANSWERS  undefined

## 2022-06-29 NOTE — TELEPHONE ENCOUNTER
----- Message from Reyes Pierson sent at 6/29/2022 11:46 AM EDT -----  Subject: Message to Provider    QUESTIONS  Information for Provider? Patient was returning [place] call to let her know   that she doing ok.   ---------------------------------------------------------------------------  --------------  [address]  What is the best way for the office to contact you? OK to leave message on   voicemail  Preferred Call Back Phone Number?  [phone]  ---------------------------------------------------------------------------  --------------  SCRIPT ANSWERS  undefined

## 2022-06-29 NOTE — CARE COORDINATION
[facility] Transitions Follow Up Call    2022    Patient: Koffi Moon  Patient : 1950   MRN: [id number]  Reason for Admission:   Discharge Date: 22 RARS: Readmission Risk Score: 19.3 ( )    Writer called Madelyn with [facility] to confirm patient f/u appointment with PCP. Patient's appointment is 7/15 3pm with Dr. Adeel Ernandez. Patient updated. Signing off. Spoke with: Madelyn/ [facility]---Criselda Tinoco/Patient    Care Transitions Subsequent and Final Call    Subsequent and Final Calls  Care Transitions Interventions  Other Interventions:            Follow Up  Future Appointments   Date Time Provider Britni Rogeli   2022  8:40 AM Viv Romero MD Neuro Spec [address] [facility]   2022 11:00 AM Ty Durham   7/15/2022  3:00 PM Dianna Messer MD [address]   2022 10:00 AM DO Lisy [place] Neuro Oswald Apgar, RN

## 2022-06-30 ENCOUNTER — OFFICE VISIT (OUTPATIENT)
Dept: NEUROLOGY | Age: 72
End: 2022-06-30
Payer: MEDICARE

## 2022-06-30 ENCOUNTER — TELEPHONE (OUTPATIENT)
Dept: INTERNAL MEDICINE CLINIC | Age: 72
End: 2022-06-30

## 2022-06-30 VITALS
WEIGHT: 180 LBS | DIASTOLIC BLOOD PRESSURE: 65 MMHG | BODY MASS INDEX: 31.89 KG/M2 | SYSTOLIC BLOOD PRESSURE: 138 MMHG | HEART RATE: 61 BPM | HEIGHT: 63 IN

## 2022-06-30 DIAGNOSIS — R26.9 GAIT ABNORMALITY: ICD-10-CM

## 2022-06-30 DIAGNOSIS — G95.9 CERVICAL MYELOPATHY (HCC): Primary | ICD-10-CM

## 2022-06-30 DIAGNOSIS — R29.6 FREQUENT FALLS: ICD-10-CM

## 2022-06-30 PROCEDURE — 99214 OFFICE O/P EST MOD 30 MIN: CPT | Performed by: PSYCHIATRY & NEUROLOGY

## 2022-06-30 PROCEDURE — 1123F ACP DISCUSS/DSCN MKR DOCD: CPT | Performed by: PSYCHIATRY & NEUROLOGY

## 2022-06-30 ASSESSMENT — ENCOUNTER SYMPTOMS
GASTROINTESTINAL NEGATIVE: 1
EYES NEGATIVE: 1
RESPIRATORY NEGATIVE: 1
ALLERGIC/IMMUNOLOGIC NEGATIVE: 1

## 2022-06-30 NOTE — PROGRESS NOTES
Active problem patient was seen on consultation at [facility] on March 17 for frequent falling episodes . Cervical myelopathy status post decompression with residual left side weakness . [facility]  She is at home using walker falling 2 times per week . She will open refrigerator door not giving herself too much room . She has been on eliquis for  suspected DVT . She has homehealth to begin with home PT . She reports that some of falls are do to not using walker . She did get walker with seat this week . She has history of cervical myelopathy seen neurosurgery Dr Evorn Essex having had prior C3-6 posterior fusion with laminectomies last year  . MRI cervical spine fron January 2022 posterior fixation C3 to C6 with multi level degenerative changes with no canal stenosis . She reports that for one year even before neck surgery she has had mild eft arm weakness dropping things with left hand along with left leg weakness with leg giveway along with left foot going out . There has been neck pain . MRI thoracic spine from March with normal cord with moderate to severe bilateral T2-T3 neural foraminal stenosis . Mild to moderate right T5-6 and T6-7  neural foraminal stenosis . Head CT this admission with mild chronic periventricular small vessel disease . CT cervical spine posterior metallic fusion Z1-L6 with laminectomies. She is on eliquis 5 mg po bid  for DVT . Significant medications eliquis 5 mg po bid ,neurontin 100 mg po bid . Testing MRI cervical spine fron January posterior fixation C3 to C6 with multi level degenerative changes with no canal stenosis, January 2022 . MRI thoracic spine from March with normal cord with moderate to severe bilateral T2-T3 neural foraminal stenosis .  Mild to moderate right T5-6 and T6-7  neural foarminal stenosis , march 2022 Head CT this admission with mild chronic periventricular small vessel disease . CT cervical spine posterior metallic fusion G9-E2 with laminectomies. MRI lumbar spine  L4-5 prior posterior fusion with grade 1 to 2 anterolisthesis with laminectomy . L5-S1 bilateral laminectomies      Past Medical History:   Diagnosis Date    Allergic rhinitis, cause unspecified     Back pain     lumbar    Bowel obstruction (HCC)     history of due to scar tissue, resolved non-surgically    C. difficile diarrhea     CAD (coronary artery disease)     no stent needed per pt.  Dr. Orozco Late did cath at  2005    Cardiac murmur     Cellulitis     left leg    Cellulitis 2017 August    leg left leg/bug bite    Cerebral artery occlusion with cerebral infarction [facility])     TIA 2014    COVID-19     ONE YR AGO IN 4/25/2020 fever and cough    Diverticulosis of colon (without mention of hemorrhage)     GERD (gastroesophageal reflux disease)     GERD (gastroesophageal reflux disease)     on rx    History of blood transfusion     approx 2020        History of CHF (congestive heart failure)     History of MI (myocardial infarction) 2005    thought due to a blood clot    History of ovarian cyst 1970    had oopherectomy holly    History of peritonitis 1968    due to ruptured appendix age 13    HTN (hypertension)     Hx of blood clots     right leg    Hyperlipidemia     Intestinal or peritoneal adhesions with obstruction (postoperative) (postinfection) ([facility] [address]     Kidney infection     renal failure/sepsis/spider bite    Lateral epicondylitis  of elbow     MDRO (multiple drug resistant organisms) resistance     c diff    Muscle strain     right posterior shoulder    Other abnormal glucose     PONV (postoperative nausea and vomiting)     dry heaves    Pre-diabetes     Restless legs syndrome (RLS)     Snores     no cpap    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)     TIA (transient ischemic attack) 2014    Uses walker     Vitamin D deficiency     Wears glasses     Wellness examination last seen 2 weeks ago       Past Surgical History:   Procedure Laterality Date    ABDOMEN SURGERY  1976    benign tumor removed near remaining ovary, 1.5 pounds    APPENDECTOMY  1968    appendix ruptured, developed peritonitis    BACK SURGERY      BUNIONECTOMY Left     along with calcium deposits removed   [address]  2005    negative    CERVICAL FUSION  05/21/2021    POSTERIOR C3-6 LAMINECTOMY, PARTIAL C7 LAMINECTOMY, FUSION C3-C6, SILVERCORD    CERVICAL FUSION N/A 5/21/2021    POSTERIOR C3-6 LAMINECTOMY, PARTIAL C7 LAMINECTOMY, FUSION C3-C6, SILVERCORD performed by Rufina Kinney DO at [address]    12 INCHES REMOVED D/T OBSTRUCTION    COLONOSCOPY      CYST REMOVAL Right     right facial    HYSTERECTOMY (CERVIX STATUS UNKNOWN)  1973    taken as a result of recurring cysts    LUMBAR FUSION N/A 02/10/2020    LUMBAR L4-5 POSTERIOR  DECOMPRESSION INSTRUMENTATION FUSION WCEMENT AUGMENTATION/ performed by Naomi Lubin MD at [place] [address] N/A 06/17/2020    L5-S1 PLIF L4-L5 REVISION performed by Naomi Lubin MD at [place] [address]  08/14/2014    [address]    UNILATERAL due to cyst    OVARY REMOVAL  1971    partial, due to cyst    SINUS SURGERY  2004    UPPER GASTROINTESTINAL ENDOSCOPY N/A 05/31/2019    EGD ESOPHAGOGASTRODUODENOSCOPY performed by Beau Dewitt MD at [address] [place] [address] 08/05/2019    EGD BIOPSY performed by Sherri Huertas MD at [facility] 08/23/2019    EGD BIOPSY performed by Beau Dewitt MD at [address] 03/05/2019    WRIST OPEN REDUCTION INTERNAL FIXATION performed by Elif Rees MD at [place] [address] History   Problem Relation Age of Onset    Stroke Mother     Diabetes Mother     Heart Disease Mother     High Blood Pressure Mother     Heart Disease Father     Heart Disease Brother     High Blood Pressure Brother     Heart Disease Maternal Grandmother     High Blood Pressure Sister        Social History     Socioeconomic History    Marital status:      Spouse name: None    Number of children: None    Years of education: None    Highest education level: None   Occupational History    Occupation: retired   Tobacco Use    Smoking status: Former Smoker     Packs/day: 0.50     Years: 20.00     Pack years: 10.00     Types: Cigarettes     Start date: 1995     Quit date: 2017     Years since quittin.0    Smokeless tobacco: Never Used   Vaping Use    Vaping Use: Never used   Substance and Sexual Activity    Alcohol use: No     Alcohol/week: 0.0 standard drinks    Drug use: No    Sexual activity: None   Other Topics Concern    None   Social History Narrative    None     Social Determinants of Health     Financial Resource Strain:     Difficulty of Paying Living Expenses: Not on file   Food Insecurity:     Worried About Running Out of Food in the Last Year: Not on file    Raghavendra of Food in the Last Year: Not on file   Transportation Needs:     Lack of Transportation (Medical): Not on file    Lack of Transportation (Non-Medical):  Not on file   Physical Activity:     Days of Exercise per Week: Not on file    Minutes of Exercise per Session: Not on file   Stress:     Feeling of Stress : Not on file   Social Connections:     Frequency of Communication with Friends and Family: Not on file    Frequency of Social Gatherings with Friends and Family: Not on file    Attends Protestant Services: Not on file    Active Member of Clubs or Organizations: Not on file    Attends Club or Organization Meetings: Not on file    Marital Status: Not on file   Intimate Partner Violence:     Fear of Current or Ex-Partner: Not on file    Emotionally Abused: Not on file    Physically Abused: Not on file   [facility] Sexually Abused: Not on file   Housing Stability:     Unable to Pay for Housing in the Last Year: Not on file    Number of Stew in the Last Year: Not on file    Unstable Housing in the Last Year: Not on file       Current Outpatient Medications   Medication Sig Dispense Refill    traZODone (DESYREL) 50 MG tablet TAKE 1 TABLET BY MOUTH EVERY NIGHT AS NEEDED FOR SLEEP 30 tablet 0    furosemide (LASIX) 20 MG tablet Take 1 tablet by mouth daily 30 tablet 0    citalopram (CELEXA) 20 MG tablet Take 1 tablet by mouth daily 30 tablet 0    gabapentin (NEURONTIN) 100 MG capsule Take 2 capsules by mouth 2 times daily for 30 days.  120 capsule 0    fenofibrate micronized (LOFIBRA) 134 MG capsule Take 1 capsule by mouth every morning (before breakfast) 90 capsule 2    apixaban (ELIQUIS) 5 MG TABS tablet Take 1 tablet by mouth 2 times daily 60 tablet 0    busPIRone (BUSPAR) 5 MG tablet Take 1 tablet by mouth 3 times daily 90 tablet 0    albuterol-ipratropium (COMBIVENT RESPIMAT)  MCG/ACT AERS inhaler Inhale 1 puff into the lungs every 6 hours as needed for Wheezing or Shortness of Breath 4 g 0    atorvastatin (LIPITOR) 40 MG tablet Take 1 tablet by mouth nightly 30 tablet 0    pramipexole (MIRAPEX) 0.5 MG tablet Take 1 tablet by mouth nightly 30 tablet 0    carvedilol (COREG) 12.5 MG tablet Take 1 tablet by mouth 2 times daily 60 tablet 0    amLODIPine (NORVASC) 5 MG tablet Take 1 tablet by mouth daily 30 tablet 0    acetaminophen (TYLENOL) 325 MG tablet Take 2 tablets by mouth every 4 hours as needed for Pain      melatonin 3 MG TABS tablet Take 2 tablets by mouth nightly as needed (insomnia)      nitroGLYCERIN (NITROSTAT) 0.4 MG SL tablet Place 1 tablet under the tongue every 5 minutes as needed for Chest pain 75 tablet 3    cyanocobalamin ([employer] VITAMIN B12) 1000 MCG tablet Take 1 tablet by mouth daily (Patient taking differently: Take 1,000 mcg by mouth at bedtime ) 30 tablet 3    ferrous sulfate (IRON 325) 325 (65 Fe) MG tablet Take 1 tablet by mouth 2 times daily 90 tablet 2    VITAMIN D, ERGOCALCIFEROL, PO Take by mouth nightly       Calcium Carbonate-Vitamin D (CALCIUM 500/D) 500-125 MG-UNIT TABS Take 1 tablet by mouth nightly       Lancets MISC 1 each by Does not apply route daily (Patient not taking: Reported on 6/27/2022) 100 each 3    blood glucose monitor strips Test 2 times a day & as needed for symptoms of irregular blood glucose. (Patient not taking: Reported on 6/27/2022) 100 strip 5     No current facility-administered medications for this visit. Allergies   Allergen Reactions    Bactrim [Sulfamethoxazole-Trimethoprim] Other (See Comments)     confusion    Codeine Itching    Diazepam Other (See Comments)    Meperidine Hcl Other (See Comments)    Seasonal          Review of Systems     Vitals:    06/30/22 0907   BP: 138/65   Pulse: 61     weight: 180 lb (81.6 kg)      Review of Systems   Constitutional: Negative. HENT: Negative. Eyes: Negative. Respiratory: Negative. Cardiovascular: Negative. Gastrointestinal: Negative. Endocrine: Negative. Genitourinary: Negative. Musculoskeletal: Positive for gait problem and neck pain. Skin: Negative. Allergic/Immunologic: Negative. Hematological: Negative. Psychiatric/Behavioral: Negative. Neurological Examination  Constitutional .General exam well groomed   Head/ Ears /Nose/Throat/external ear . Normal exam  Neck and thyroid . Normal size. No bruits  Respiratory . Breathsounds clear bilaterally  Cardiovascular:  Auscultation of heart with regular rate and rhythm   Musculoskeletal. Muscle bulk and tone normal                                                           Muscle strength left ADF and EHL 4+/5 otherwise 5/5 strength throughout                                                                                No dysmetria or dysdiadokinesis  No tremor   Normal fine motor  Orientation Alert and oriented x 3 Attention and concentration normal  Short term memory normal  Language process and speech normal . No aphasia   Cranial nerve 2 normal acuety and visual fields  Cranial nerve 3, 4 and 6 . Extraocular muscles are intact . Pupils are equal and reactive   Cranial nerve 5 . Intact corneal reflex. Normal facial sensation  Cranial nerve 7 normal exam   Cranial nerve 8. Grossly intact hearing   Cranial nerve 9 and 10. Symmetric palate elevation   Cranial nerve 11 , 5 out of 5 strength   Cranial Nerve 12 midline tongue . No atrophy  Sensation . Normal pinprick and light touch   Deep Tendon Reflexes normal  Plantar response flexor bilaterally       ASSESSMENT/PLAN      Diagnosis Orders   1. Cervical myelopathy (HCC)     2. Gait abnormality     3. Frequent falls     She is to use walker at all times to proceed with Home PT .  If falls were to remain issues will need to come of eliquis      As above

## 2022-07-05 DIAGNOSIS — G25.81 RLS (RESTLESS LEGS SYNDROME): ICD-10-CM

## 2022-07-05 RX ORDER — PRAMIPEXOLE DIHYDROCHLORIDE 0.5 MG/1
0.5 TABLET ORAL NIGHTLY
Qty: 30 TABLET | Refills: 0 | Status: SHIPPED | OUTPATIENT
Start: 2022-07-05 | End: 2022-10-03 | Stop reason: SDUPTHER

## 2022-07-05 RX ORDER — BUSPIRONE HYDROCHLORIDE 5 MG/1
5 TABLET ORAL 3 TIMES DAILY
Qty: 90 TABLET | Refills: 0 | Status: SHIPPED | OUTPATIENT
Start: 2022-07-05

## 2022-07-10 ENCOUNTER — APPOINTMENT (OUTPATIENT)
Dept: CT IMAGING | Age: 72
DRG: 872 | End: 2022-07-10
Payer: MEDICARE

## 2022-07-10 ENCOUNTER — APPOINTMENT (OUTPATIENT)
Dept: GENERAL RADIOLOGY | Age: 72
DRG: 872 | End: 2022-07-10
Payer: MEDICARE

## 2022-07-10 ENCOUNTER — TELEPHONE (OUTPATIENT)
Dept: OTHER | Facility: CLINIC | Age: 72
End: 2022-07-10

## 2022-07-10 ENCOUNTER — HOSPITAL ENCOUNTER (INPATIENT)
Age: 72
LOS: 4 days | Discharge: NURSING, REHAB OR LONG-TERM CARE | DRG: 872 | End: 2022-07-21
Attending: EMERGENCY MEDICINE | Admitting: INTERNAL MEDICINE
Payer: MEDICARE

## 2022-07-10 DIAGNOSIS — S22.22XA CLOSED FRACTURE OF BODY OF STERNUM, INITIAL ENCOUNTER: Primary | ICD-10-CM

## 2022-07-10 DIAGNOSIS — S22.31XA CLOSED FRACTURE OF ONE RIB OF RIGHT SIDE, INITIAL ENCOUNTER: ICD-10-CM

## 2022-07-10 DIAGNOSIS — S00.83XA CONTUSION OF FACE, INITIAL ENCOUNTER: ICD-10-CM

## 2022-07-10 PROBLEM — S22.22XB: Status: ACTIVE | Noted: 2022-07-10

## 2022-07-10 PROBLEM — S42.018A: Status: ACTIVE | Noted: 2022-07-10

## 2022-07-10 LAB
ABSOLUTE EOS #: 0.16 K/UL (ref 0–0.4)
ABSOLUTE LYMPH #: 0.8 K/UL (ref 1–4.8)
ABSOLUTE MONO #: 0.64 K/UL (ref 0.1–1.3)
ANION GAP SERPL CALCULATED.3IONS-SCNC: 13 MMOL/L (ref 9–17)
BASOPHILS # BLD: 0 % (ref 0–2)
BASOPHILS ABSOLUTE: 0 K/UL (ref 0–0.2)
BUN BLDV-MCNC: 44 MG/DL (ref 8–23)
CALCIUM SERPL-MCNC: 9.7 MG/DL (ref 8.6–10.4)
CHLORIDE BLD-SCNC: 100 MMOL/L (ref 98–107)
CO2: 26 MMOL/L (ref 20–31)
CREAT SERPL-MCNC: 1.26 MG/DL (ref 0.5–0.9)
EOSINOPHILS RELATIVE PERCENT: 1 % (ref 0–4)
GFR AFRICAN AMERICAN: 51 ML/MIN
GFR NON-AFRICAN AMERICAN: 42 ML/MIN
GFR SERPL CREATININE-BSD FRML MDRD: ABNORMAL ML/MIN/{1.73_M2}
GLUCOSE BLD-MCNC: 118 MG/DL (ref 70–99)
HCT VFR BLD CALC: 36.9 % (ref 36–46)
HEMOGLOBIN: 12.4 G/DL (ref 12–16)
INR BLD: 1.4
LYMPHOCYTES # BLD: 5 % (ref 24–44)
MCH RBC QN AUTO: 31.4 PG (ref 26–34)
MCHC RBC AUTO-ENTMCNC: 33.6 G/DL (ref 31–37)
MCV RBC AUTO: 93.5 FL (ref 80–100)
MONOCYTES # BLD: 4 % (ref 1–7)
MORPHOLOGY: NORMAL
PARTIAL THROMBOPLASTIN TIME: 37.7 SEC (ref 24–36)
PDW BLD-RTO: 13.8 % (ref 11.5–14.9)
PLATELET # BLD: 295 K/UL (ref 150–450)
PMV BLD AUTO: 7.1 FL (ref 6–12)
POTASSIUM SERPL-SCNC: 3.8 MMOL/L (ref 3.7–5.3)
PROTHROMBIN TIME: 17.3 SEC (ref 11.8–14.6)
RBC # BLD: 3.95 M/UL (ref 4–5.2)
SEG NEUTROPHILS: 90 % (ref 36–66)
SEGMENTED NEUTROPHILS ABSOLUTE COUNT: 14.4 K/UL (ref 1.3–9.1)
SODIUM BLD-SCNC: 139 MMOL/L (ref 135–144)
WBC # BLD: 16 K/UL (ref 3.5–11)

## 2022-07-10 PROCEDURE — 6370000000 HC RX 637 (ALT 250 FOR IP): Performed by: EMERGENCY MEDICINE

## 2022-07-10 PROCEDURE — 2580000003 HC RX 258: Performed by: SURGERY

## 2022-07-10 PROCEDURE — A4216 STERILE WATER/SALINE, 10 ML: HCPCS | Performed by: SURGERY

## 2022-07-10 PROCEDURE — 70486 CT MAXILLOFACIAL W/O DYE: CPT

## 2022-07-10 PROCEDURE — 2580000003 HC RX 258: Performed by: EMERGENCY MEDICINE

## 2022-07-10 PROCEDURE — 85730 THROMBOPLASTIN TIME PARTIAL: CPT

## 2022-07-10 PROCEDURE — 70450 CT HEAD/BRAIN W/O DYE: CPT

## 2022-07-10 PROCEDURE — 6360000004 HC RX CONTRAST MEDICATION: Performed by: EMERGENCY MEDICINE

## 2022-07-10 PROCEDURE — 6370000000 HC RX 637 (ALT 250 FOR IP): Performed by: SURGERY

## 2022-07-10 PROCEDURE — G0378 HOSPITAL OBSERVATION PER HR: HCPCS

## 2022-07-10 PROCEDURE — C9113 INJ PANTOPRAZOLE SODIUM, VIA: HCPCS | Performed by: SURGERY

## 2022-07-10 PROCEDURE — 99285 EMERGENCY DEPT VISIT HI MDM: CPT

## 2022-07-10 PROCEDURE — 72125 CT NECK SPINE W/O DYE: CPT

## 2022-07-10 PROCEDURE — 6370000000 HC RX 637 (ALT 250 FOR IP): Performed by: NURSE PRACTITIONER

## 2022-07-10 PROCEDURE — 73502 X-RAY EXAM HIP UNI 2-3 VIEWS: CPT

## 2022-07-10 PROCEDURE — 96361 HYDRATE IV INFUSION ADD-ON: CPT

## 2022-07-10 PROCEDURE — 85025 COMPLETE CBC W/AUTO DIFF WBC: CPT

## 2022-07-10 PROCEDURE — 36415 COLL VENOUS BLD VENIPUNCTURE: CPT

## 2022-07-10 PROCEDURE — 96375 TX/PRO/DX INJ NEW DRUG ADDON: CPT

## 2022-07-10 PROCEDURE — 96376 TX/PRO/DX INJ SAME DRUG ADON: CPT

## 2022-07-10 PROCEDURE — 71260 CT THORAX DX C+: CPT

## 2022-07-10 PROCEDURE — 6360000002 HC RX W HCPCS: Performed by: SURGERY

## 2022-07-10 PROCEDURE — 80048 BASIC METABOLIC PNL TOTAL CA: CPT

## 2022-07-10 PROCEDURE — 85610 PROTHROMBIN TIME: CPT

## 2022-07-10 PROCEDURE — 93005 ELECTROCARDIOGRAM TRACING: CPT | Performed by: SURGERY

## 2022-07-10 PROCEDURE — 6360000002 HC RX W HCPCS: Performed by: EMERGENCY MEDICINE

## 2022-07-10 RX ORDER — HYDROCODONE BITARTRATE AND ACETAMINOPHEN 5; 325 MG/1; MG/1
2 TABLET ORAL ONCE
Status: COMPLETED | OUTPATIENT
Start: 2022-07-10 | End: 2022-07-10

## 2022-07-10 RX ORDER — SODIUM CHLORIDE 0.9 % (FLUSH) 0.9 %
10 SYRINGE (ML) INJECTION AS NEEDED
Status: DISCONTINUED | OUTPATIENT
Start: 2022-07-10 | End: 2022-07-19

## 2022-07-10 RX ORDER — LANOLIN ALCOHOL/MO/W.PET/CERES
6 CREAM (GRAM) TOPICAL NIGHTLY PRN
Status: DISCONTINUED | OUTPATIENT
Start: 2022-07-10 | End: 2022-07-21 | Stop reason: HOSPADM

## 2022-07-10 RX ORDER — SODIUM CHLORIDE 9 MG/ML
INJECTION, SOLUTION INTRAVENOUS PRN
Status: DISCONTINUED | OUTPATIENT
Start: 2022-07-10 | End: 2022-07-21 | Stop reason: HOSPADM

## 2022-07-10 RX ORDER — SODIUM CHLORIDE 9 MG/ML
INJECTION, SOLUTION INTRAVENOUS CONTINUOUS
Status: DISCONTINUED | OUTPATIENT
Start: 2022-07-10 | End: 2022-07-14

## 2022-07-10 RX ORDER — SODIUM CHLORIDE 0.9 % (FLUSH) 0.9 %
5-40 SYRINGE (ML) INJECTION PRN
Status: DISCONTINUED | OUTPATIENT
Start: 2022-07-10 | End: 2022-07-21 | Stop reason: HOSPADM

## 2022-07-10 RX ORDER — ONDANSETRON 4 MG/1
4 TABLET, ORALLY DISINTEGRATING ORAL EVERY 8 HOURS PRN
Status: DISCONTINUED | OUTPATIENT
Start: 2022-07-10 | End: 2022-07-21 | Stop reason: HOSPADM

## 2022-07-10 RX ORDER — FENTANYL CITRATE 50 UG/ML
25 INJECTION, SOLUTION INTRAMUSCULAR; INTRAVENOUS
Status: DISCONTINUED | OUTPATIENT
Start: 2022-07-10 | End: 2022-07-19

## 2022-07-10 RX ORDER — IPRATROPIUM BROMIDE AND ALBUTEROL SULFATE 2.5; .5 MG/3ML; MG/3ML
1 SOLUTION RESPIRATORY (INHALATION) EVERY 6 HOURS PRN
Status: DISCONTINUED | OUTPATIENT
Start: 2022-07-10 | End: 2022-07-21 | Stop reason: HOSPADM

## 2022-07-10 RX ORDER — ONDANSETRON 2 MG/ML
4 INJECTION INTRAMUSCULAR; INTRAVENOUS EVERY 6 HOURS PRN
Status: DISCONTINUED | OUTPATIENT
Start: 2022-07-10 | End: 2022-07-21 | Stop reason: HOSPADM

## 2022-07-10 RX ORDER — FERROUS SULFATE 325(65) MG
325 TABLET ORAL 2 TIMES DAILY
Status: DISCONTINUED | OUTPATIENT
Start: 2022-07-10 | End: 2022-07-21 | Stop reason: HOSPADM

## 2022-07-10 RX ORDER — HEPARIN SODIUM 5000 [USP'U]/ML
5000 INJECTION, SOLUTION INTRAVENOUS; SUBCUTANEOUS 2 TIMES DAILY
Status: DISCONTINUED | OUTPATIENT
Start: 2022-07-11 | End: 2022-07-11

## 2022-07-10 RX ORDER — MORPHINE SULFATE 2 MG/ML
2 INJECTION, SOLUTION INTRAMUSCULAR; INTRAVENOUS
Status: DISCONTINUED | OUTPATIENT
Start: 2022-07-10 | End: 2022-07-10

## 2022-07-10 RX ORDER — ONDANSETRON 2 MG/ML
4 INJECTION INTRAMUSCULAR; INTRAVENOUS EVERY 6 HOURS PRN
Status: DISCONTINUED | OUTPATIENT
Start: 2022-07-10 | End: 2022-07-10

## 2022-07-10 RX ORDER — BUSPIRONE HYDROCHLORIDE 5 MG/1
5 TABLET ORAL 3 TIMES DAILY
Status: DISCONTINUED | OUTPATIENT
Start: 2022-07-10 | End: 2022-07-21 | Stop reason: HOSPADM

## 2022-07-10 RX ORDER — 0.9 % SODIUM CHLORIDE 0.9 %
80 INTRAVENOUS SOLUTION INTRAVENOUS ONCE
Status: COMPLETED | OUTPATIENT
Start: 2022-07-10 | End: 2022-07-10

## 2022-07-10 RX ORDER — DEXTROSE MONOHYDRATE 50 MG/ML
100 INJECTION, SOLUTION INTRAVENOUS PRN
Status: DISCONTINUED | OUTPATIENT
Start: 2022-07-10 | End: 2022-07-21 | Stop reason: HOSPADM

## 2022-07-10 RX ORDER — POLYETHYLENE GLYCOL 3350 17 G/17G
17 POWDER, FOR SOLUTION ORAL DAILY PRN
Status: DISCONTINUED | OUTPATIENT
Start: 2022-07-10 | End: 2022-07-21 | Stop reason: HOSPADM

## 2022-07-10 RX ORDER — FENTANYL CITRATE 50 UG/ML
50 INJECTION, SOLUTION INTRAMUSCULAR; INTRAVENOUS
Status: DISCONTINUED | OUTPATIENT
Start: 2022-07-10 | End: 2022-07-19

## 2022-07-10 RX ORDER — ACETAMINOPHEN 325 MG/1
650 TABLET ORAL EVERY 4 HOURS PRN
Status: DISCONTINUED | OUTPATIENT
Start: 2022-07-10 | End: 2022-07-13 | Stop reason: SDUPTHER

## 2022-07-10 RX ORDER — OXYCODONE HYDROCHLORIDE AND ACETAMINOPHEN 5; 325 MG/1; MG/1
1 TABLET ORAL EVERY 4 HOURS PRN
Status: DISCONTINUED | OUTPATIENT
Start: 2022-07-10 | End: 2022-07-21 | Stop reason: HOSPADM

## 2022-07-10 RX ORDER — ATORVASTATIN CALCIUM 40 MG/1
40 TABLET, FILM COATED ORAL NIGHTLY
Status: DISCONTINUED | OUTPATIENT
Start: 2022-07-10 | End: 2022-07-21 | Stop reason: HOSPADM

## 2022-07-10 RX ORDER — ACETAMINOPHEN 650 MG/1
650 SUPPOSITORY RECTAL EVERY 6 HOURS PRN
Status: DISCONTINUED | OUTPATIENT
Start: 2022-07-10 | End: 2022-07-21 | Stop reason: HOSPADM

## 2022-07-10 RX ORDER — 0.9 % SODIUM CHLORIDE 0.9 %
1000 INTRAVENOUS SOLUTION INTRAVENOUS ONCE
Status: COMPLETED | OUTPATIENT
Start: 2022-07-10 | End: 2022-07-10

## 2022-07-10 RX ORDER — PRAMIPEXOLE DIHYDROCHLORIDE 0.25 MG/1
0.5 TABLET ORAL NIGHTLY
Status: DISCONTINUED | OUTPATIENT
Start: 2022-07-10 | End: 2022-07-21 | Stop reason: HOSPADM

## 2022-07-10 RX ORDER — ACETAMINOPHEN 325 MG/1
650 TABLET ORAL EVERY 6 HOURS PRN
Status: DISCONTINUED | OUTPATIENT
Start: 2022-07-10 | End: 2022-07-21 | Stop reason: HOSPADM

## 2022-07-10 RX ORDER — CITALOPRAM 20 MG/1
20 TABLET ORAL DAILY
Status: DISCONTINUED | OUTPATIENT
Start: 2022-07-11 | End: 2022-07-21 | Stop reason: HOSPADM

## 2022-07-10 RX ORDER — SODIUM CHLORIDE 0.9 % (FLUSH) 0.9 %
5-40 SYRINGE (ML) INJECTION EVERY 12 HOURS SCHEDULED
Status: DISCONTINUED | OUTPATIENT
Start: 2022-07-10 | End: 2022-07-21 | Stop reason: HOSPADM

## 2022-07-10 RX ORDER — MORPHINE SULFATE 4 MG/ML
4 INJECTION, SOLUTION INTRAMUSCULAR; INTRAVENOUS ONCE
Status: COMPLETED | OUTPATIENT
Start: 2022-07-10 | End: 2022-07-10

## 2022-07-10 RX ORDER — TRAZODONE HYDROCHLORIDE 50 MG/1
50 TABLET ORAL NIGHTLY PRN
Status: DISCONTINUED | OUTPATIENT
Start: 2022-07-10 | End: 2022-07-21 | Stop reason: HOSPADM

## 2022-07-10 RX ORDER — MORPHINE SULFATE 4 MG/ML
4 INJECTION, SOLUTION INTRAMUSCULAR; INTRAVENOUS
Status: DISCONTINUED | OUTPATIENT
Start: 2022-07-10 | End: 2022-07-10

## 2022-07-10 RX ADMIN — SODIUM CHLORIDE, PRESERVATIVE FREE 10 ML: 5 INJECTION INTRAVENOUS at 14:48

## 2022-07-10 RX ADMIN — SODIUM CHLORIDE, PRESERVATIVE FREE 10 ML: 5 INJECTION INTRAVENOUS at 21:44

## 2022-07-10 RX ADMIN — MORPHINE SULFATE 4 MG: 4 INJECTION, SOLUTION INTRAMUSCULAR; INTRAVENOUS at 16:22

## 2022-07-10 RX ADMIN — SODIUM CHLORIDE 1000 ML: 9 INJECTION, SOLUTION INTRAVENOUS at 15:09

## 2022-07-10 RX ADMIN — SODIUM CHLORIDE 80 ML: 9 INJECTION, SOLUTION INTRAVENOUS at 14:47

## 2022-07-10 RX ADMIN — SODIUM CHLORIDE: 9 INJECTION, SOLUTION INTRAVENOUS at 18:18

## 2022-07-10 RX ADMIN — FENTANYL CITRATE 50 MCG: 50 INJECTION, SOLUTION INTRAMUSCULAR; INTRAVENOUS at 21:31

## 2022-07-10 RX ADMIN — FERROUS SULFATE TAB 325 MG (65 MG ELEMENTAL FE) 325 MG: 325 (65 FE) TAB at 21:44

## 2022-07-10 RX ADMIN — IOPAMIDOL 75 ML: 755 INJECTION, SOLUTION INTRAVENOUS at 14:48

## 2022-07-10 RX ADMIN — BUSPIRONE HYDROCHLORIDE 5 MG: 5 TABLET ORAL at 21:40

## 2022-07-10 RX ADMIN — FENTANYL CITRATE 50 MCG: 50 INJECTION, SOLUTION INTRAMUSCULAR; INTRAVENOUS at 18:28

## 2022-07-10 RX ADMIN — HYDROCODONE BITARTRATE AND ACETAMINOPHEN 2 TABLET: 5; 325 TABLET ORAL at 13:23

## 2022-07-10 RX ADMIN — ATORVASTATIN CALCIUM 40 MG: 40 TABLET, FILM COATED ORAL at 21:40

## 2022-07-10 RX ADMIN — PANTOPRAZOLE SODIUM 40 MG: 40 INJECTION, POWDER, FOR SOLUTION INTRAVENOUS at 18:28

## 2022-07-10 RX ADMIN — PRAMIPEXOLE DIHYDROCHLORIDE 0.5 MG: 0.25 TABLET ORAL at 22:24

## 2022-07-10 RX ADMIN — OXYCODONE HYDROCHLORIDE AND ACETAMINOPHEN 1 TABLET: 5; 325 TABLET ORAL at 23:09

## 2022-07-10 ASSESSMENT — PAIN - FUNCTIONAL ASSESSMENT: PAIN_FUNCTIONAL_ASSESSMENT: 0-10

## 2022-07-10 ASSESSMENT — ENCOUNTER SYMPTOMS
DIARRHEA: 0
COLOR CHANGE: 0
ABDOMINAL PAIN: 1
COUGH: 0
BACK PAIN: 0
SHORTNESS OF BREATH: 0
SORE THROAT: 0
VOMITING: 0
NAUSEA: 0
BLOOD IN STOOL: 0
CONSTIPATION: 0
TROUBLE SWALLOWING: 0

## 2022-07-10 ASSESSMENT — LIFESTYLE VARIABLES
HOW MANY STANDARD DRINKS CONTAINING ALCOHOL DO YOU HAVE ON A TYPICAL DAY: 1 OR 2
HOW OFTEN DO YOU HAVE A DRINK CONTAINING ALCOHOL: NEVER

## 2022-07-10 ASSESSMENT — PAIN SCALES - GENERAL
PAINLEVEL_OUTOF10: 9
PAINLEVEL_OUTOF10: 10
PAINLEVEL_OUTOF10: 9
PAINLEVEL_OUTOF10: 0
PAINLEVEL_OUTOF10: 8
PAINLEVEL_OUTOF10: 10

## 2022-07-10 ASSESSMENT — PAIN DESCRIPTION - LOCATION
LOCATION: ABDOMEN;CHEST
LOCATION: RIB CAGE

## 2022-07-10 ASSESSMENT — PAIN DESCRIPTION - DESCRIPTORS: DESCRIPTORS: ACHING;SHARP;STABBING

## 2022-07-10 ASSESSMENT — PAIN DESCRIPTION - ORIENTATION
ORIENTATION: MID
ORIENTATION: RIGHT

## 2022-07-10 NOTE — H&P
(1971); Hysterectomy (1973); Bunionectomy (Left); sinus surgery (2004); Colonoscopy; other surgical history (08/14/2014); cyst removal (Right); Wrist fracture surgery (Left, 03/05/2019); Upper gastrointestinal endoscopy (N/A, 05/31/2019); Upper gastrointestinal endoscopy (N/A, 08/05/2019); Upper gastrointestinal endoscopy (N/A, 08/23/2019); Abdomen surgery (1976); lumbar fusion (N/A, 02/10/2020); Cardiac catheterization; Cardiac catheterization (2005); [place] tooth extraction; lumbar fusion (N/A, 06/17/2020); back surgery; cervical fusion (05/21/2021); and cervical fusion (N/A, 5/21/2021). Medications  Prior to Admission medications    Medication Sig Start Date End Date Taking? Authorizing Provider   busPIRone (BUSPAR) 5 MG tablet Take 1 tablet by mouth 3 times daily 7/5/22   Lizz Bishop MD   pramipexole (MIRAPEX) 0.5 MG tablet Take 1 tablet by mouth nightly 7/5/22   Lizz Bishop MD   traZODone (DESYREL) 50 MG tablet TAKE 1 TABLET BY MOUTH EVERY NIGHT AS NEEDED FOR SLEEP 6/29/22   Lizz Bishop MD   furosemide (LASIX) 20 MG tablet Take 1 tablet by mouth daily 6/29/22   Lizz Bishop MD   citalopram (CELEXA) 20 MG tablet Take 1 tablet by mouth daily 6/27/22   Lizz Bishop MD   gabapentin (NEURONTIN) 100 MG capsule Take 2 capsules by mouth 2 times daily for 30 days.  6/8/22 7/8/22  Gideon Hopson MD   fenofibrate micronized (LOFIBRA) 134 MG capsule Take 1 capsule by mouth every morning (before breakfast) 5/23/22   Lizz Bishop MD   apixaban (ELIQUIS) 5 MG TABS tablet Take 1 tablet by mouth 2 times daily 5/2/22   Lizz Bishop MD   albuterol-ipratropium (COMBIVENT RESPIMAT)  MCG/ACT AERS inhaler Inhale 1 puff into the lungs every 6 hours as needed for Wheezing or Shortness of Breath 4/10/22   Luis Armando Borges MD   atorvastatin (LIPITOR) 40 MG tablet Take 1 tablet by mouth nightly 4/10/22   Luis Armando Borges MD   carvedilol (COREG) 12.5 MG tablet Take 1 tablet by mouth 2 times daily 4/10/22 Scarlet Griffin MD   amLODIPine (NORVASC) 5 MG tablet Take 1 tablet by mouth daily 4/10/22   Scarlet Griffin MD   acetaminophen (TYLENOL) 325 MG tablet Take 2 tablets by mouth every 4 hours as needed for Pain 4/7/22   Scarlet Griffin MD   melatonin 3 MG TABS tablet Take 2 tablets by mouth nightly as needed (insomnia) 4/7/22   Scarlet Griffin MD   nitroGLYCERIN (NITROSTAT) 0.4 MG SL tablet Place 1 tablet under the tongue every 5 minutes as needed for Chest pain 9/1/21   Paxton Lopez MD   cyanocobalamin ([employer] VITAMIN B12) 1000 MCG tablet Take 1 tablet by mouth daily  Patient taking differently: Take 1,000 mcg by mouth at bedtime  12/3/20   Paxton Lopez MD   ferrous sulfate (IRON 325) 325 (65 Fe) MG tablet Take 1 tablet by mouth 2 times daily 7/2/20   Zoltan Quintana MD   VITAMIN D, ERGOCALCIFEROL, PO Take by mouth nightly   Patient not taking: Reported on 7/10/2022    Historical Provider, MD   Lancets MISC 1 each by Does not apply route daily  Patient not taking: Reported on 6/27/2022 10/10/19   Latasha Vargas MD   blood glucose monitor strips Test 2 times a day & as needed for symptoms of irregular blood glucose. Patient not taking: Reported on 6/27/2022 10/10/19   Latasha Vargas MD   Calcium Carbonate-Vitamin D (CALCIUM 500/D) 500-125 MG-UNIT TABS Take 1 tablet by mouth nightly     Historical Provider, MD     Allergies  is allergic to bactrim [sulfamethoxazole-trimethoprim], codeine, diazepam, meperidine hcl, and seasonal.    Family History  family history includes Diabetes in her mother; Heart Disease in her brother, father, maternal grandmother, and mother; High Blood Pressure in her brother, mother, and sister; Stroke in her mother. Social History   reports that she quit smoking about 5 years ago. Her smoking use included cigarettes. She started smoking about 26 years ago. She has a 10.00 pack-year smoking history.  She has never used smokeless tobacco. She reports that she does not drink alcohol and does not use drugs. Review of Systems:  General Denies any fever or chills  HEENT Denies any diplopia, tinnitus or vertigo  Resp Denies any shortness of breath, cough or wheezing  Cardiac Denies any palpitations, claudication or edema  GI Denies any melena, hematochezia, hematemesis or pyrosis   Denies any frequency, urgency, hesitancy or incontinence  Heme Denies bruising or bleeding easily  Endocrine Denies any history of diabetes or thyroid disease  Neuro Denies any focal motor or sensory deficits    OBJECTIVE:   VITALS:  height is 5' 3\" (1.6 m) and weight is 185 lb 13.6 oz (84.3 kg). Her oral temperature is 98.1 °F (36.7 °C). Her blood pressure is 130/63 and her pulse is 95. Her respiration is 16 and oxygen saturation is 95%. CONSTITUTIONAL: awake alert in some distress  SKIN: Skin color, texture, turgor normal. No rashes or lesions. LYMPH: no cervical nodes, no inguinal nodes  HEENT: Head is normocephalic, atraumatic. EOMI, PERRLA  NECK: Supple, symmetrical, trachea midline, no adenopathy, thyroid symmetric, not enlarged and no tenderness, skin normal  CHEST/LUNGS: tender over the sternum no deformitychest symmetric with normal A/P diameter, normal respiratory rate and rhythm, lungs clear to auscultation without wheezes, rales or rhonchi. No accessory muscle use. Scars None   CARDIOVASCULAR: Heart regular rate and rhythm Normal S1 and S2. . Carotid and femoral pulses 2+/4 and equal bilaterally  ABDOMEN: soft ND NTRECTAL: deferred, not clinically indicated  NEUROLOGIC: There are no focalizing motor or sensory deficits. CN II-XII are grossly intact.   EXTREMITIES: no cyanosis, no clubbing and no edema    LABS:   CBC with Differential:    Lab Results   Component Value Date/Time    WBC 16.0 07/10/2022 01:27 PM    RBC 3.95 07/10/2022 01:27 PM    HGB 12.4 07/10/2022 01:27 PM    HCT 36.9 07/10/2022 01:27 PM     07/10/2022 01:27 PM    MCV 93.5 07/10/2022 01:27 PM    MCH 31.4 07/10/2022 01:27 PM    MCHC 33.6 07/10/2022 01:27 PM    RDW 13.8 07/10/2022 01:27 PM    LYMPHOPCT 5 07/10/2022 01:27 PM    MONOPCT 4 07/10/2022 01:27 PM    BASOPCT 0 07/10/2022 01:27 PM    MONOSABS 0.64 07/10/2022 01:27 PM    LYMPHSABS 0.80 07/10/2022 01:27 PM    EOSABS 0.16 07/10/2022 01:27 PM    BASOSABS 0.00 07/10/2022 01:27 PM    DIFFTYPE NOT REPORTED 12/22/2021 03:22 PM     BMP:    Lab Results   Component Value Date/Time     07/10/2022 01:27 PM    K 3.8 07/10/2022 01:27 PM     07/10/2022 01:27 PM    CO2 26 07/10/2022 01:27 PM    BUN 44 07/10/2022 01:27 PM    LABALBU 4.3 06/05/2022 03:15 PM    CREATININE 1.26 07/10/2022 01:27 PM    CALCIUM 9.7 07/10/2022 01:27 PM    GFRAA 51 07/10/2022 01:27 PM    LABGLOM 42 07/10/2022 01:27 PM    GLUCOSE 118 07/10/2022 01:27 PM     Hepatic Function Panel:    Lab Results   Component Value Date/Time    ALKPHOS 47 06/05/2022 03:15 PM    ALT 17 06/05/2022 03:15 PM    AST 24 06/05/2022 03:15 PM    PROT 7.0 06/05/2022 03:15 PM    BILITOT 0.33 06/05/2022 03:15 PM    LABALBU 4.3 06/05/2022 03:15 PM     Calcium:    Lab Results   Component Value Date/Time    CALCIUM 9.7 07/10/2022 01:27 PM     Magnesium:    Lab Results   Component Value Date/Time    MG 2.1 08/22/2019 06:31 AM     Phosphorus:    Lab Results   Component Value Date/Time    PHOS 3.7 08/01/2017 06:09 AM     PT/INR:    Lab Results   Component Value Date/Time    PROTIME 17.3 07/10/2022 01:27 PM    INR 1.4 07/10/2022 01:27 PM     ABG:  No results found for: PHART, PH, AJI9ACE, PCO2, PO2ART, PO2, HHK4TXQ, HCO3, BEART, BE, THGBART, THB, ZGV5KNR, I4CVJRJT, O2SAT  Urine Culture:  No components found for: CURINE  Blood Culture:  No components found for: CBLOOD, CFUNGUSBL  Stool Culture:  No components found for: CSTOOL    RADIOLOGY:   I have personally reviewed the following films:  CT HEAD WO CONTRAST    Result Date: 7/10/2022  EXAMINATION: CT OF THE HEAD WITHOUT CONTRAST  7/10/2022 2:12 pm TECHNIQUE: CT of the head was performed without the administration of intravenous contrast. Automated exposure control, iterative reconstruction, and/or weight based adjustment of the mA/kV was utilized to reduce the radiation dose to as low as reasonably achievable. COMPARISON: 06/05/2022 HISTORY: ORDERING SYSTEM PROVIDED HISTORY: fall TECHNOLOGIST PROVIDED HISTORY: fall Decision Support Exception - unselect if not a suspected or confirmed emergency medical condition->Emergency Medical Condition (MA) Reason for Exam: fall x 2 days ago FINDINGS: BRAIN/VENTRICLES: There is no acute intracranial hemorrhage, mass effect or midline shift. No abnormal extra-axial fluid collection. The gray-white differentiation is maintained. There is no evidence of hydrocephalus. ORBITS: The visualized portion of the orbits demonstrate no acute abnormality. SINUSES: Partial opacification of the right maxillary sinus. Similar appearance of an ossified lesion in the right maxillary sinus. SOFT TISSUES/SKULL:  No acute abnormality of the visualized skull or soft tissues. No acute intracranial abnormality. CT FACIAL BONES WO CONTRAST    Result Date: 7/10/2022  EXAMINATION: CT OF THE FACE WITHOUT CONTRAST  7/10/2022 2:20 pm TECHNIQUE: CT of the face was performed without the administration of intravenous contrast. Multiplanar reformatted images are provided for review. Automated exposure control, iterative reconstruction, and/or weight based adjustment of the mA/kV was utilized to reduce the radiation dose to as low as reasonably achievable.  COMPARISON: Head CT 12/22/2021 and 05/30/2019 HISTORY: ORDERING SYSTEM PROVIDED HISTORY: Fall TECHNOLOGIST PROVIDED HISTORY: Fall Decision Support Exception - unselect if not a suspected or confirmed emergency medical condition->Emergency Medical Condition (MA) Reason for Exam: fall, left sided facial pain Additional signs and symptoms: left sided contusion FINDINGS: FACIAL BONES: The frontal sinuses, orbital walls, maxilla, pterygoid plates, zygomatic arches, hard palate, nasal bones and mandible are intact. The temporomandibular joints are aligned. ORBITAL CONTENTS: The globes appear intact. The extraocular muscles, optic nerve sheath complexes and lacrimal glands appear unremarkable. No retrobulbar hematoma or mass is seen. SINUSES: Mild mucosal thickening right left maxillary sinuses. Prominent ossified density extending from the floor of the right maxillary sinus typical of exostosis. There is no evidence of acute sinusitis, such as air fluid level. The mastoid air cells are clear. SOFT TISSUES: No superficial facial soft tissue swelling is seen. No acute facial bone trauma. Mild nonspecific inflammatory changes bilateral maxillary sinuses. CT CERVICAL SPINE WO CONTRAST    Result Date: 7/10/2022  EXAMINATION: CT OF THE CERVICAL SPINE WITHOUT CONTRAST 7/10/2022 2:22 pm TECHNIQUE: CT of the cervical spine was performed without the administration of intravenous contrast. Multiplanar reformatted images are provided for review. Automated exposure control, iterative reconstruction, and/or weight based adjustment of the mA/kV was utilized to reduce the radiation dose to as low as reasonably achievable. COMPARISON: 06/05/2022 HISTORY: ORDERING SYSTEM PROVIDED HISTORY: Fall TECHNOLOGIST PROVIDED HISTORY: Fall Decision Support Exception - unselect if not a suspected or confirmed emergency medical condition->Emergency Medical Condition (MA) Reason for Exam: fall Additional signs and symptoms: neck pain Relevant Medical/Surgical History: hx of neck surgery 78-year-old female with history of fall and history of neck surgery. FINDINGS: BONES/ALIGNMENT: Cervical spine is imaged from the skull base to the lower T3 vertebral body level on the sagittal reconstructions. Kyphosis of the cervical spine centered at C6-C7. Odontoid appears intact. Lateral masses symmetric in appearance. Occipital condyles articulate properly with the lateral masses.

## 2022-07-10 NOTE — PROGRESS NOTES
Dr Aaron Granger notified new admission to floor.  Stated np for nightshift will see her. nightshift to notify

## 2022-07-10 NOTE — ED PROVIDER NOTES
history that includes Ovary removal (1970); colectomy (1969); Appendectomy (1968); Ovary removal (1971); Hysterectomy (1973); Bunionectomy (Left); sinus surgery (2004); Colonoscopy; other surgical history (08/14/2014); cyst removal (Right); Wrist fracture surgery (Left, 03/05/2019); Upper gastrointestinal endoscopy (N/A, 05/31/2019); Upper gastrointestinal endoscopy (N/A, 08/05/2019); Upper gastrointestinal endoscopy (N/A, 08/23/2019); Abdomen surgery (1976); lumbar fusion (N/A, 02/10/2020); Cardiac catheterization; Cardiac catheterization (2005); [place] tooth extraction; lumbar fusion (N/A, 06/17/2020); back surgery; cervical fusion (05/21/2021); and cervical fusion (N/A, 5/21/2021). CURRENT MEDICATIONS       Previous Medications    ACETAMINOPHEN (TYLENOL) 325 MG TABLET    Take 2 tablets by mouth every 4 hours as needed for Pain    ALBUTEROL-IPRATROPIUM (COMBIVENT RESPIMAT)  MCG/ACT AERS INHALER    Inhale 1 puff into the lungs every 6 hours as needed for Wheezing or Shortness of Breath    AMLODIPINE (NORVASC) 5 MG TABLET    Take 1 tablet by mouth daily    APIXABAN (ELIQUIS) 5 MG TABS TABLET    Take 1 tablet by mouth 2 times daily    ATORVASTATIN (LIPITOR) 40 MG TABLET    Take 1 tablet by mouth nightly    BLOOD GLUCOSE MONITOR STRIPS    Test 2 times a day & as needed for symptoms of irregular blood glucose.     BUSPIRONE (BUSPAR) 5 MG TABLET    Take 1 tablet by mouth 3 times daily    CALCIUM CARBONATE-VITAMIN D (CALCIUM 500/D) 500-125 MG-UNIT TABS    Take 1 tablet by mouth nightly     CARVEDILOL (COREG) 12.5 MG TABLET    Take 1 tablet by mouth 2 times daily    CITALOPRAM (CELEXA) 20 MG TABLET    Take 1 tablet by mouth daily    CYANOCOBALAMIN ([employer] VITAMIN B12) 1000 MCG TABLET    Take 1 tablet by mouth daily    FENOFIBRATE MICRONIZED (LOFIBRA) 134 MG CAPSULE    Take 1 capsule by mouth every morning (before breakfast)    FERROUS SULFATE (IRON 325) 325 (65 FE) MG TABLET    Take 1 tablet by mouth 2 times daily FUROSEMIDE (LASIX) 20 MG TABLET    Take 1 tablet by mouth daily    GABAPENTIN (NEURONTIN) 100 MG CAPSULE    Take 2 capsules by mouth 2 times daily for 30 days. LANCETS MISC    1 each by Does not apply route daily    MELATONIN 3 MG TABS TABLET    Take 2 tablets by mouth nightly as needed (insomnia)    NITROGLYCERIN (NITROSTAT) 0.4 MG SL TABLET    Place 1 tablet under the tongue every 5 minutes as needed for Chest pain    PRAMIPEXOLE (MIRAPEX) 0.5 MG TABLET    Take 1 tablet by mouth nightly    TRAZODONE (DESYREL) 50 MG TABLET    TAKE 1 TABLET BY MOUTH EVERY NIGHT AS NEEDED FOR SLEEP    VITAMIN D, ERGOCALCIFEROL, PO    Take by mouth nightly        ALLERGIES     is allergic to bactrim [sulfamethoxazole-trimethoprim], codeine, diazepam, meperidine hcl, and seasonal.    FAMILY HISTORY     She indicated that her mother is . She indicated that her father is . She indicated that the status of her sister is unknown. She indicated that her brother is . She indicated that her maternal grandmother is . family history includes Diabetes in her mother; Heart Disease in her brother, father, maternal grandmother, and mother; High Blood Pressure in her brother, mother, and sister; Stroke in her mother. SOCIAL HISTORY      reports that she quit smoking about 5 years ago. Her smoking use included cigarettes. She started smoking about 26 years ago. She has a 10.00 pack-year smoking history. She has never used smokeless tobacco. She reports that she does not drink alcohol and does not use drugs. PHYSICAL EXAM     INITIAL VITALS:  weight is 180 lb (81.6 kg). Her oral temperature is 98.1 °F (36.7 °C). Her blood pressure is 130/63 and her pulse is 95. Her respiration is 16 and oxygen saturation is 95%. Physical Exam  Vitals and nursing note reviewed. Constitutional:       General: She is not in acute distress. HENT:      Head: Normocephalic and atraumatic.    Eyes: Conjunctiva/sclera: Conjunctivae normal.      Pupils: Pupils are equal, round, and reactive to light. Cardiovascular:      Rate and Rhythm: Normal rate and regular rhythm. Heart sounds: Normal heart sounds. No murmur heard. Pulmonary:      Effort: Pulmonary effort is normal. No respiratory distress. Breath sounds: Normal breath sounds. Chest:      Chest wall: Tenderness (Right lower lateral rib cage) present. Abdominal:      General: Bowel sounds are normal. There is no distension. Palpations: Abdomen is soft. Tenderness: There is no abdominal tenderness. Musculoskeletal:         General: No tenderness. Cervical back: Neck supple. Lymphadenopathy:      Cervical: No cervical adenopathy. Skin:     General: Skin is warm and dry. Findings: Bruising (Right eye and face) present. No rash. Neurological:      Mental Status: She is alert and oriented to person, place, and time. Psychiatric:         Judgment: Judgment normal.           DIFFERENTIAL DIAGNOSIS/MDM:   52-year-old female presents with a mechanical fall 2 days ago now with a headache, right-sided chest pain and right abdominal pain. She is afebrile, nontoxic, normal vital signs. No acute distress. She does have some bruising around her right eye. Extraocular movements are intact. Pupils are equal, round and reactive to light. Has been tenderness in her right side of her chest but no flail chest, bruising that I can appreciate. We will get broad trauma work-up especially with patient being on blood thinners. We will treat pain. DIAGNOSTIC RESULTS     EKG: All EKG's are interpreted by the Emergency Department Physician who either signs or Co-signs this chart in the absence of a cardiologist.        RADIOLOGY:   I directly visualized the following  images and reviewed the radiologist interpretations:  XR HIP 2-3 VW W PELVIS LEFT   Preliminary Result   No acute bony abnormality identified.          CT CHEST ABDOMEN PELVIS W CONTRAST   Preliminary Result   No acute traumatic abnormality identified in the chest, abdomen, or pelvis. Subacute mildly displaced proximal sternal body fracture. Age-indeterminate buckle fracture of the right lateral 10th rib. Multiple   old left-sided rib fractures. Distended gallbladder containing a stone at the gallbladder neck. Postsurgical changes in the lower lumbar spine with chronic appearing height   loss of L5.         CT CERVICAL SPINE WO CONTRAST   Final Result   1. Kyphosis of the cervical spine centered at C6-C7. 2. Mild multilevel degenerative changes in the cervical spine primarily at   C5-C6. 3. No acute vertebral body height loss in the cervical spine. 4. Evidence of posterior spinal fusion from C3-C6. RECOMMENDATIONS:   Unavailable         CT FACIAL BONES WO CONTRAST   Final Result   No acute facial bone trauma. Mild nonspecific inflammatory changes bilateral maxillary sinuses. CT HEAD WO CONTRAST   Preliminary Result   No acute intracranial abnormality.                  ED BEDSIDE ULTRASOUND:      LABS:  Labs Reviewed   CBC WITH AUTO DIFFERENTIAL - Abnormal; Notable for the following components:       Result Value    WBC 16.0 (*)     RBC 3.95 (*)     Seg Neutrophils 90 (*)     Lymphocytes 5 (*)     Segs Absolute 14.40 (*)     Absolute Lymph # 0.80 (*)     All other components within normal limits   BASIC METABOLIC PANEL - Abnormal; Notable for the following components:    Glucose 118 (*)     BUN 44 (*)     CREATININE 1.26 (*)     GFR Non- 42 (*)     GFR  51 (*)     All other components within normal limits   PROTIME-INR - Abnormal; Notable for the following components:    Protime 17.3 (*)     All other components within normal limits   APTT - Abnormal; Notable for the following components:    PTT 37.7 (*)     All other components within normal limits         EMERGENCY DEPARTMENT COURSE: Vitals:    Vitals:    07/10/22 1253 07/10/22 1511   BP: (!) 133/57 130/63   Pulse: 90 95   Resp: 20 16   Temp: 98.1 °F (36.7 °C)    TempSrc: Oral    SpO2: 95% 95%   Weight: 180 lb (81.6 kg)      4:16 PM EDT  CT scan shows a subacute sternal body fracture. Also age-indeterminate fracture of a right rib and also chronic fractures of the left ribs. There are no acute intra-abdominal injuries. I spoke with general surgery Dr. Diandra Taylor accepted patient for admission. Spoke with Dr. Primitivo Delgado hospitalist will see the patient for medical management. CRITICALCARE:      CONSULTS:  IP CONSULT TO GENERAL SURGERY      PROCEDURES:      FINAL IMPRESSION      1. Closed fracture of body of sternum, initial encounter    2. Closed fracture of one rib of right side, initial encounter    3. Contusion of face, initial encounter            DISPOSITION/PLAN   DISPOSITION Decision To Admit 07/10/2022 04:05:35 PM          PATIENT REFERRED TO:  No follow-up provider specified. DISCHARGE MEDICATIONS:  New Prescriptions    No medications on file       The care is provided during an unprecedented national emergency due to the novel coronavirus, COVID-19.     (Please note that portions ofthis note were completed with a voice recognition program.  Efforts were made to edit the dictations but occasionally words are mis-transcribed.)    Jackie Jang DO  Attending Emergency Physician          Jackie Jang DO  07/10/22 [id number]

## 2022-07-10 NOTE — PROGRESS NOTES
Dr Corrina Martínez notified pt to floor. He is placing orders. Pt reported hx leg dvt.  Per dr Corrina Martínez no epc cuffs

## 2022-07-10 NOTE — TELEPHONE ENCOUNTER
Writer contacted Dr. Jennifer Mcfarlane to inform of 30 day readmission risk. Dr. Jennifer Mcfarlane informed writer there is no decision on disposition at this time.       Call Back: If you need to call back to inform of disposition you can contact me at [phone]

## 2022-07-10 NOTE — ED NOTES
Report given to MEENU Bauer from PCU. Report method by phone   The following was reviewed with receiving RN:   Current vital signs:  /63   Pulse 95   Temp 98.1 °F (36.7 °C) (Oral)   Resp 16   Wt 180 lb (81.6 kg)   SpO2 95%   BMI 31.89 kg/m²                MEWS Score: 1     Any medication or safety alerts were reviewed. Any pending diagnostics and notifications were also reviewed, as well as any safety concerns or issues, abnormal labs, abnormal imaging, and abnormal assessment findings. Questions were answered.             Silvia Bernard RN  07/10/22 [id number]

## 2022-07-11 ENCOUNTER — APPOINTMENT (OUTPATIENT)
Dept: GENERAL RADIOLOGY | Age: 72
DRG: 872 | End: 2022-07-11
Payer: MEDICARE

## 2022-07-11 ENCOUNTER — APPOINTMENT (OUTPATIENT)
Dept: NUCLEAR MEDICINE | Age: 72
DRG: 872 | End: 2022-07-11
Payer: MEDICARE

## 2022-07-11 ENCOUNTER — APPOINTMENT (OUTPATIENT)
Dept: ULTRASOUND IMAGING | Age: 72
DRG: 872 | End: 2022-07-11
Payer: MEDICARE

## 2022-07-11 LAB
ABSOLUTE BANDS #: 2.63 K/UL (ref 0–1)
ABSOLUTE EOS #: 0 K/UL (ref 0–0.4)
ABSOLUTE LYMPH #: 1.46 K/UL (ref 1–4.8)
ABSOLUTE MONO #: 0.58 K/UL (ref 0.1–1.3)
ALBUMIN SERPL-MCNC: 3.7 G/DL (ref 3.5–5.2)
ALP BLD-CCNC: 36 U/L (ref 35–104)
ALT SERPL-CCNC: 15 U/L (ref 5–33)
ANION GAP SERPL CALCULATED.3IONS-SCNC: 14 MMOL/L (ref 9–17)
AST SERPL-CCNC: 30 U/L
BACTERIA: NORMAL
BANDS: 18 % (ref 0–10)
BASOPHILS # BLD: 0 % (ref 0–2)
BASOPHILS ABSOLUTE: 0 K/UL (ref 0–0.2)
BILIRUB SERPL-MCNC: 0.6 MG/DL (ref 0.3–1.2)
BILIRUBIN DIRECT: 0.17 MG/DL
BILIRUBIN URINE: NEGATIVE
BILIRUBIN, INDIRECT: 0.43 MG/DL (ref 0–1)
BUN BLDV-MCNC: 44 MG/DL (ref 8–23)
C-REACTIVE PROTEIN: 166.1 MG/L (ref 0–5)
CALCIUM SERPL-MCNC: 8.7 MG/DL (ref 8.6–10.4)
CASTS UA: NORMAL /LPF
CHLORIDE BLD-SCNC: 103 MMOL/L (ref 98–107)
CO2: 22 MMOL/L (ref 20–31)
COLOR: YELLOW
CREAT SERPL-MCNC: 1.52 MG/DL (ref 0.5–0.9)
EKG ATRIAL RATE: 95 BPM
EKG P AXIS: 57 DEGREES
EKG P-R INTERVAL: 152 MS
EKG Q-T INTERVAL: 338 MS
EKG QRS DURATION: 76 MS
EKG QTC CALCULATION (BAZETT): 424 MS
EKG R AXIS: 28 DEGREES
EKG T AXIS: -2 DEGREES
EKG VENTRICULAR RATE: 95 BPM
EOSINOPHILS RELATIVE PERCENT: 0 % (ref 0–4)
EPITHELIAL CELLS UA: NORMAL /HPF
GFR AFRICAN AMERICAN: 41 ML/MIN
GFR NON-AFRICAN AMERICAN: 34 ML/MIN
GFR SERPL CREATININE-BSD FRML MDRD: ABNORMAL ML/MIN/{1.73_M2}
GLUCOSE BLD-MCNC: 124 MG/DL (ref 70–99)
GLUCOSE URINE: NEGATIVE
HCT VFR BLD CALC: 34.6 % (ref 36–46)
HEMOGLOBIN: 12.2 G/DL (ref 12–16)
KETONES, URINE: ABNORMAL
LEUKOCYTE ESTERASE, URINE: NEGATIVE
LIPASE: 24 U/L (ref 13–60)
LYMPHOCYTES # BLD: 10 % (ref 24–44)
MCH RBC QN AUTO: 32.7 PG (ref 26–34)
MCHC RBC AUTO-ENTMCNC: 35.4 G/DL (ref 31–37)
MCV RBC AUTO: 92.5 FL (ref 80–100)
METAMYELOCYTES ABSOLUTE COUNT: 0.29 K/UL
METAMYELOCYTES: 2 %
MONOCYTES # BLD: 4 % (ref 1–7)
MORPHOLOGY: NORMAL
NITRITE, URINE: NEGATIVE
PDW BLD-RTO: 13.8 % (ref 11.5–14.9)
PH UA: 5.5 (ref 5–8)
PLATELET # BLD: 225 K/UL (ref 150–450)
PMV BLD AUTO: 7.8 FL (ref 6–12)
POTASSIUM SERPL-SCNC: 4.5 MMOL/L (ref 3.7–5.3)
PROTEIN UA: ABNORMAL
RBC # BLD: 3.74 M/UL (ref 4–5.2)
RBC UA: NORMAL /HPF
SEG NEUTROPHILS: 66 % (ref 36–66)
SEGMENTED NEUTROPHILS ABSOLUTE COUNT: 9.64 K/UL (ref 1.3–9.1)
SODIUM BLD-SCNC: 139 MMOL/L (ref 135–144)
SPECIFIC GRAVITY UA: 1.04 (ref 1–1.03)
TOTAL PROTEIN: 6.9 G/DL (ref 6.4–8.3)
TURBIDITY: CLEAR
URINE HGB: ABNORMAL
UROBILINOGEN, URINE: NORMAL
WBC # BLD: 14.6 K/UL (ref 3.5–11)
WBC UA: NORMAL /HPF

## 2022-07-11 PROCEDURE — A9537 TC99M MEBROFENIN: HCPCS | Performed by: SURGERY

## 2022-07-11 PROCEDURE — 76705 ECHO EXAM OF ABDOMEN: CPT

## 2022-07-11 PROCEDURE — 83690 ASSAY OF LIPASE: CPT

## 2022-07-11 PROCEDURE — G0378 HOSPITAL OBSERVATION PER HR: HCPCS

## 2022-07-11 PROCEDURE — 96372 THER/PROPH/DIAG INJ SC/IM: CPT

## 2022-07-11 PROCEDURE — 3430000000 HC RX DIAGNOSTIC RADIOPHARMACEUTICAL: Performed by: SURGERY

## 2022-07-11 PROCEDURE — 71046 X-RAY EXAM CHEST 2 VIEWS: CPT

## 2022-07-11 PROCEDURE — 86140 C-REACTIVE PROTEIN: CPT

## 2022-07-11 PROCEDURE — 36415 COLL VENOUS BLD VENIPUNCTURE: CPT

## 2022-07-11 PROCEDURE — 2580000003 HC RX 258: Performed by: NURSE PRACTITIONER

## 2022-07-11 PROCEDURE — 85025 COMPLETE CBC W/AUTO DIFF WBC: CPT

## 2022-07-11 PROCEDURE — 6360000002 HC RX W HCPCS: Performed by: SURGERY

## 2022-07-11 PROCEDURE — 96365 THER/PROPH/DIAG IV INF INIT: CPT

## 2022-07-11 PROCEDURE — 2580000003 HC RX 258: Performed by: SURGERY

## 2022-07-11 PROCEDURE — 96376 TX/PRO/DX INJ SAME DRUG ADON: CPT

## 2022-07-11 PROCEDURE — C9113 INJ PANTOPRAZOLE SODIUM, VIA: HCPCS | Performed by: SURGERY

## 2022-07-11 PROCEDURE — 99218 PR INITIAL OBSERVATION CARE/DAY 30 MINUTES: CPT | Performed by: INTERNAL MEDICINE

## 2022-07-11 PROCEDURE — 6370000000 HC RX 637 (ALT 250 FOR IP): Performed by: INTERNAL MEDICINE

## 2022-07-11 PROCEDURE — 6360000002 HC RX W HCPCS: Performed by: NURSE PRACTITIONER

## 2022-07-11 PROCEDURE — 93010 ELECTROCARDIOGRAM REPORT: CPT | Performed by: INTERNAL MEDICINE

## 2022-07-11 PROCEDURE — 81001 URINALYSIS AUTO W/SCOPE: CPT

## 2022-07-11 PROCEDURE — A4216 STERILE WATER/SALINE, 10 ML: HCPCS | Performed by: SURGERY

## 2022-07-11 PROCEDURE — 6370000000 HC RX 637 (ALT 250 FOR IP): Performed by: NURSE PRACTITIONER

## 2022-07-11 PROCEDURE — 80048 BASIC METABOLIC PNL TOTAL CA: CPT

## 2022-07-11 PROCEDURE — 80076 HEPATIC FUNCTION PANEL: CPT

## 2022-07-11 PROCEDURE — 99222 1ST HOSP IP/OBS MODERATE 55: CPT | Performed by: NURSE PRACTITIONER

## 2022-07-11 PROCEDURE — 78227 HEPATOBIL SYST IMAGE W/DRUG: CPT

## 2022-07-11 PROCEDURE — 6370000000 HC RX 637 (ALT 250 FOR IP): Performed by: SURGERY

## 2022-07-11 RX ORDER — SODIUM CHLORIDE 0.9 % (FLUSH) 0.9 %
10 SYRINGE (ML) INJECTION PRN
Status: DISCONTINUED | OUTPATIENT
Start: 2022-07-11 | End: 2022-07-19

## 2022-07-11 RX ADMIN — FERROUS SULFATE TAB 325 MG (65 MG ELEMENTAL FE) 325 MG: 325 (65 FE) TAB at 20:04

## 2022-07-11 RX ADMIN — ACETAMINOPHEN 325MG 650 MG: 325 TABLET ORAL at 14:46

## 2022-07-11 RX ADMIN — OXYCODONE HYDROCHLORIDE AND ACETAMINOPHEN 1 TABLET: 5; 325 TABLET ORAL at 04:17

## 2022-07-11 RX ADMIN — BUSPIRONE HYDROCHLORIDE 5 MG: 5 TABLET ORAL at 20:03

## 2022-07-11 RX ADMIN — FENTANYL CITRATE 50 MCG: 50 INJECTION, SOLUTION INTRAMUSCULAR; INTRAVENOUS at 05:06

## 2022-07-11 RX ADMIN — FENTANYL CITRATE 50 MCG: 50 INJECTION, SOLUTION INTRAMUSCULAR; INTRAVENOUS at 22:05

## 2022-07-11 RX ADMIN — SODIUM CHLORIDE: 9 INJECTION, SOLUTION INTRAVENOUS at 01:15

## 2022-07-11 RX ADMIN — FENTANYL CITRATE 50 MCG: 50 INJECTION, SOLUTION INTRAMUSCULAR; INTRAVENOUS at 01:12

## 2022-07-11 RX ADMIN — FENTANYL CITRATE 50 MCG: 50 INJECTION, SOLUTION INTRAMUSCULAR; INTRAVENOUS at 18:01

## 2022-07-11 RX ADMIN — HEPARIN SODIUM 5000 UNITS: 5000 INJECTION, SOLUTION INTRAVENOUS; SUBCUTANEOUS at 07:54

## 2022-07-11 RX ADMIN — ATORVASTATIN CALCIUM 40 MG: 40 TABLET, FILM COATED ORAL at 20:04

## 2022-07-11 RX ADMIN — Medication 4.9 MILLICURIE: at 11:31

## 2022-07-11 RX ADMIN — FENTANYL CITRATE 50 MCG: 50 INJECTION, SOLUTION INTRAMUSCULAR; INTRAVENOUS at 20:04

## 2022-07-11 RX ADMIN — PANTOPRAZOLE SODIUM 40 MG: 40 INJECTION, POWDER, FOR SOLUTION INTRAVENOUS at 07:47

## 2022-07-11 RX ADMIN — APIXABAN 5 MG: 5 TABLET, FILM COATED ORAL at 18:01

## 2022-07-11 RX ADMIN — CEFTRIAXONE SODIUM 1000 MG: 1 INJECTION, POWDER, FOR SOLUTION INTRAMUSCULAR; INTRAVENOUS at 14:46

## 2022-07-11 RX ADMIN — SODIUM CHLORIDE, PRESERVATIVE FREE 10 ML: 5 INJECTION INTRAVENOUS at 11:31

## 2022-07-11 RX ADMIN — FENTANYL CITRATE 50 MCG: 50 INJECTION, SOLUTION INTRAMUSCULAR; INTRAVENOUS at 07:47

## 2022-07-11 RX ADMIN — FENTANYL CITRATE 50 MCG: 50 INJECTION, SOLUTION INTRAMUSCULAR; INTRAVENOUS at 12:49

## 2022-07-11 RX ADMIN — PRAMIPEXOLE DIHYDROCHLORIDE 0.5 MG: 0.25 TABLET ORAL at 20:19

## 2022-07-11 RX ADMIN — FENTANYL CITRATE 50 MCG: 50 INJECTION, SOLUTION INTRAMUSCULAR; INTRAVENOUS at 03:06

## 2022-07-11 ASSESSMENT — PAIN SCALES - GENERAL
PAINLEVEL_OUTOF10: 10
PAINLEVEL_OUTOF10: 1
PAINLEVEL_OUTOF10: 10
PAINLEVEL_OUTOF10: 8
PAINLEVEL_OUTOF10: 10

## 2022-07-11 ASSESSMENT — PAIN DESCRIPTION - LOCATION
LOCATION: OTHER (COMMENT)
LOCATION: ABDOMEN
LOCATION: RIB CAGE

## 2022-07-11 ASSESSMENT — PAIN DESCRIPTION - DESCRIPTORS
DESCRIPTORS: ACHING;CRAMPING;STABBING
DESCRIPTORS: THROBBING
DESCRIPTORS: DISCOMFORT;THROBBING
DESCRIPTORS: ACHING;CRUSHING

## 2022-07-11 ASSESSMENT — PAIN DESCRIPTION - ORIENTATION
ORIENTATION: RIGHT

## 2022-07-11 NOTE — CARE COORDINATION
SW received notice from [facility] RN case manager that if pt were to need SNF placement, pt  would like pt to go to [facility].  SW will follow as there are not PT or OT notes to send a referral.     Electronically signed by HIPOLITO Tabor on 7/11/2022 at 10:04 AM

## 2022-07-11 NOTE — FLOWSHEET NOTE
07/10/22 2051   Treatment Team Notification   Reason for Communication Evaluate   Team Member Name Jocelyn Prince, [address] Team Role Advanced Practice Nurse   Method of Communication Secure Message   Response Other (Comment)  (perfect serve.)       NP Jocelyn Prince was notified of Patients medication that needs to be restarted per Dr. Jose Joiner. Awaiting call back or orders.

## 2022-07-11 NOTE — PROGRESS NOTES
Per radiology tech patient is in 10/10 pain. Rad tech spoke with radiologist who advised nurse to give IV pain medication then techs will complete hida scan.

## 2022-07-11 NOTE — DISCHARGE INSTR - COC
G45.9    Need for prophylactic vaccination and inoculation against cholera alone Z23    Osteopenia M85.80    Lumbago M54.50    Meralgia paresthetica of right side G57.11    Lumbar degenerative disc disease M51.36    Spondylosis of lumbar region without myelopathy or radiculopathy M47.816    Blood poisoning [id number]    Cellulitis of left lower extremity L03.116    Encounter for medication monitoring Z51.81    Deep vein thrombosis (DVT) of right lower extremity (HCC) I82.401    Chronic deep vein thrombosis (DVT) of proximal vein of both lower extremities (HCC) I82.5Y3    Chronic diastolic heart failure (HCC) I50.32    Neurogenic claudication due to lumbar spinal stenosis M48.062    Sacroiliitis ([facility]) M46.1    Stage 3 chronic kidney disease (HCC) N18.30    Type 2 diabetes mellitus with circulatory disorder, without long-term current use of insulin (HCC) E11.59    Chronic deep vein thrombosis (DVT) of popliteal vein of right lower extremity ([facility]) I82.531    Closed fracture of left wrist S62.102A    B12 deficiency E53.8    Iron deficiency anemia secondary to inadequate dietary iron intake D50.8    Closed head injury S09.90XA    Scalp laceration S01. 01XA    Abnormal finding on imaging R93.89    Other irritable bowel syndrome K58.8    Frequent falls R29.6    Double vision H53.2    Muscle soreness M79.10    Peptic ulcer K27.9    Melena K92.1    PUD (peptic ulcer disease) K27.9    Absolute anemia D64.9    Acute blood loss anemia D62    Acute renal failure (HCC) N17.9    Clostridium difficile infection A49.8    Acquired spondylolisthesis M43.10    Spinal stenosis of lumbar region with neurogenic claudication M48.062    Acute deep vein thrombosis (DVT) of proximal vein of left lower extremity ([facility]) I82.4Y2    Leg swelling M79.89    Back pain M54.9    Neurological disorder G98.8    Closed unstable burst fracture of fifth lumbar vertebra with routine healing S32.052D    Slow transit constipation K59.01    Major depressive disorder, recurrent, moderate ([facility]) F33.1    Acute deep vein thrombosis (DVT) of femoral vein of left lower extremity ([facility]) I82.412    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[facility]) M48.02, G99.2    Acute deep vein thrombosis (DVT) of right femoral vein ([facility]) I82.411    S/P cervical spinal fusion Z98.1    Gait instability R26.81    Cervical myelopathy ([facility]) G95.9    Cellulitis of both lower extremities L03.115, L03.116    Fracture of body of sternum, initial encounter for open fracture S22.22XB    Nondisplaced fracture of sternal end of left clavicle, initial encounter for closed fracture S42.018A       Isolation/Infection:   Isolation            No Isolation          Patient Infection Status       Infection Onset Added Last Indicated Last Indicated By Review Planned Expiration Resolved Resolved By    None active    Resolved    COVID-19 (Rule Out) 05/17/21 05/17/21 05/17/21 COVID-19 (Ordered)   05/18/21 Rule-Out Test Resulted    COVID-19 (Rule Out) 04/12/21 04/13/21 04/12/21 COVID-19 (Ordered)   04/14/21 Rule-Out Test Resulted    COVID-19 04/25/20 04/25/20 04/25/20 COVID-19   06/18/20 Rayna Humphrey, RN    COVID-19 (Rule Out) 04/25/20 04/25/20 04/25/20 COVID-19 (Ordered)   04/25/20 Rule-Out Test Resulted            Nurse Assessment:  Last Vital Signs: /73   Pulse 94   Temp 98.9 °F (37.2 °C) (Oral)   Resp 19   Ht 5' 3\" (1.6 m)   Wt 185 lb 13.6 oz (84.3 kg)   SpO2 96%   BMI 32.92 kg/m²     Last documented pain score (0-10 scale): Pain Level: 10  Last Weight:   Wt Readings from Last 1 Encounters:   07/10/22 185 lb 13.6 oz (84.3 kg)     Mental Status:  {IP PT MENTAL STATUS:20030}    IV Access:  - None    Nursing Mobility/ADLs:  Walking   Assisted  Transfer  Assisted  Bathing  Assisted  Dressing  {P DME MKTB:298257824}  Toileting  Assisted  Feeding  Assisted  Med Admin  Assisted  Med Delivery   whole    Wound Care Documentation and Therapy:  Incision 05/21/21 Neck Posterior (Active)   Number of days: SECTION    Prognosis: {Prognosis:4992894024}    Condition at Discharge: Sotero Wilson Patient Condition:143078511}    Rehab Potential (if transferring to Rehab): {Prognosis:7874438737}    Recommended Labs or Other Treatments After Discharge: ***    Physician Certification: I certify the above information and transfer of Theodor Rise  is necessary for the continuing treatment of the diagnosis listed and that she requires {Admit to Appropriate Level of Care:11993} for {GREATER/LESS:868802585} 30 days.      Update Admission H&P: No change in H&P    PHYSICIAN SIGNATURE:  Christopher Nguyen MD  Electronically signed by Christopher Nguyen MD on 7/21/2022 at 10:03 AM

## 2022-07-11 NOTE — PLAN OF CARE
Problem: Safety - Adult  Goal: Free from fall injury  Outcome: Progressing  Note: Pt assessed as a fall risk this shift. Remains free from falls and accidental injury at this time. Fall precautions in place, including falling star sign and fall risk band on pt. Floor free from obstacles, and bed is locked and in lowest position. Adequate lighting provided. Pt encouraged to call  for any need. Bed alarm activated. Will continue to monitor needs during hourly rounding, and reinforce education on use of call light. Problem: Pain  Goal: Verbalizes/displays adequate comfort level or baseline comfort level  Outcome: Progressing  Note: Pt medicated with pain medication prn. Assessed all pain characteristics including level, type, location, frequency, and onset. Non-pharmacologic interventions offered to pt as well. Pt states pain is tolerable at this time. Will continue to monitor.        Problem: Discharge Planning  Goal: Discharge to home or other facility with appropriate resources  Outcome: Progressing

## 2022-07-11 NOTE — FLOWSHEET NOTE
Patient is discouraged that she's back in the hospital.      07/11/22 1155   Encounter Summary   Encounter Overview/Reason  Spiritual/Emotional Needs   Service Provided For: Patient   Referral/Consult From: Rounding   Support System Spouse; Children   Last Encounter  07/11/22   Complexity of Encounter Moderate   Spiritual/Emotional needs   Type Spiritual Support   Assessment/Intervention/Outcome   Assessment Sad;Impaired resilience   Intervention Active listening;Discussed illness injury and its impact; Explored/Affirmed feelings, thoughts, concerns;Explored Coping Skills/Resources;Prayer (assurance of)/[place];Sustaining Presence/Ministry of presence   Outcome Engaged in conversation;Expressed feelings, needs, and concerns;Expressed Gratitude;Receptive

## 2022-07-11 NOTE — CONSULTS
[facility] - Initial Consult Note  Today's Date and Time: 7/11/2022, 1:06 PM    Impression :   RLE Erisepylas  Cholelithiasis. Leukocytosis  Recent fall. DM  CKD III  Allergy to Sulfa-Confusion. Recommendations:   Ceftriaxone 1 gm IV daily x 7 days. Follow CBC and renal function closely. Follow HIDA scan, CXR. Supportive care. Medical Decision Making/Summary/Discussion:7/11/2022     Pen G is ideal therapy. Not a good choice due to Na levels, pt. Decreased renal function,hx of CHF. Infection Control Recommendations   [place] Precautions    Antimicrobial Stewardship Recommendations     Simplification of therapy  Targeted therapy    Coordination of Outpatient Care:   Estimated Length of IV antimicrobials:TBD  Patient will need Midline Catheter Insertion: TBD  Patient will need PICC line Insertion:BD  Patient will need: Home IV , [place],  SNF,  LTAC: TBD  Patient will need outpatient wound care:    Chief complaint/reason for consultation:   Possible RLE Cellulitis      History of Present Illness:   Lina Sherwood is a 71y.o.-year-old female who was initially admitted on 7/10/2022. Patient seen at the request of     INITIAL HISTORY:  52-year-old female who presented to the ED s/p fall 2 days ago. Patient states she trying to open a box of food and she slipped and fell. She hit her right side on something, she is not sure what. Nagel loss of consciousness. Associated symptoms include left hip pain, pain to her right rib cage and right upper abdomen. No associated symptoms of headache, neck or back pain. She is on blood thinners. CT results reviewed as below. Recent hospitalization 6/5-6/14 for BLE cellulitis. Treated with IV Vancomycin, discharged on Doxycycline x 7 days. Patient presents with BLE redness and swelling. Upon examination patient has erysepilas to the RLE, mid-forefoot to mid-thigh. Light pink in color.  No open wounds or areas of skin breaks. Patient states she has had this for about 2-weeks. Denies any pain to the RLE. CURRENT EVALUATION 7/11/2022  /73   Pulse 94   Temp 98.9 °F (37.2 °C) (Oral)   Resp 19   Ht 5' 3\" (1.6 m)   Wt 185 lb 13.6 oz (84.3 kg)   SpO2 96%   BMI 32.92 kg/m²   Seen and examined in PCU. Denies any fever, chills, n/v/d.  C/o suprapubic pain because she needs to urinate. No guerrero. Distended gallbladder with stone. No wall thickening or pericholecystic fluid. Labs, X rays reviewed: 7/11/2022    BUN:44>44  Cr:1.26>1.25    WBC:16.0>14.6  Hb:  Plat: 295>225    CRP:    Cultures:  Urine:    Blood:    Sputum :    Wound:    MRSA Nares:      Imaging:  XR HIP 2-3 VW W PELVIS LEFT   Preliminary Result   No acute bony abnormality identified. CT CHEST ABDOMEN PELVIS W CONTRAST   Preliminary Result   No acute traumatic abnormality identified in the chest, abdomen, or pelvis. Subacute mildly displaced proximal sternal body fracture. Age-indeterminate buckle fracture of the right lateral 10th rib. Multiple   old left-sided rib fractures. Distended gallbladder containing a stone at the gallbladder neck. Postsurgical changes in the lower lumbar spine with chronic appearing height   loss of L5.           CT CERVICAL SPINE WO CONTRAST   Final Result   1. Kyphosis of the cervical spine centered at C6-C7. 2. Mild multilevel degenerative changes in the cervical spine primarily at   C5-C6. 3. No acute vertebral body height loss in the cervical spine. 4. Evidence of posterior spinal fusion from C3-C6. RECOMMENDATIONS:   Unavailable           CT FACIAL BONES WO CONTRAST   Final Result   No acute facial bone trauma. CT HEAD WO CONTRAST   Preliminary Result   No acute intracranial abnormality. 7/11 RUQ US  FINDINGS:   LIVER:  The liver demonstrates normal echogenicity without evidence of   intrahepatic biliary ductal dilatation.   There is a paddle pedal flow in the   portal vein. Liver 17 cm in length. BILIARY SYSTEM:  Gallstones in the gallbladder. No wall thickening or   pericholecystic fluid. Common bile duct is within normal limits measuring 5.2 mm. RIGHT KIDNEY: The right kidney is grossly unremarkable without evidence of   hydronephrosis. PANCREAS:  Visualized portions of the pancreas are unremarkable. OTHER: No evidence of right upper quadrant ascites. Impression   Cholelithiasis. No acute findings. RECOMMENDATIONS:   Unavailable                   Discussed with patient, RN. I have personally reviewed the past medical history, past surgical history, medications, social history, and family history, and I have updated the database accordingly. Past Medical History:     Past Medical History:   Diagnosis Date    Allergic rhinitis, cause unspecified     Back pain     lumbar    Bowel obstruction (HCC)     history of due to scar tissue, resolved non-surgically    C. difficile diarrhea     CAD (coronary artery disease)     no stent needed per pt.  Dr. Mariela Alonso did cath at  2005    Cardiac murmur     Cellulitis     left leg    Cellulitis 2017 August    leg left leg/bug bite    Cerebral artery occlusion with cerebral infarction [facility])     TIA 2014    COVID-19     ONE YR AGO IN 4/25/2020 fever and cough    Diverticulosis of colon (without mention of hemorrhage)     GERD (gastroesophageal reflux disease)     GERD (gastroesophageal reflux disease)     on rx    History of blood transfusion     approx 2020        History of CHF (congestive heart failure)     History of MI (myocardial infarction) 2005    thought due to a blood clot    History of ovarian cyst 1970    had oopherectomy holly    History of peritonitis 1968    due to ruptured appendix age 13    HTN (hypertension)     Hx of blood clots     right leg    Hyperlipidemia     Intestinal or peritoneal adhesions with obstruction (postoperative) (postinfection) ([address] Kidney infection     renal failure/sepsis/spider bite    Lateral epicondylitis  of elbow     MDRO (multiple drug resistant organisms) resistance     c diff    Muscle strain     right posterior shoulder    Other abnormal glucose     PONV (postoperative nausea and vomiting)     dry heaves    Pre-diabetes     Restless legs syndrome (RLS)     Snores     no cpap    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)     TIA (transient ischemic attack) 2014    Uses walker     Vitamin D deficiency     Wears glasses     Wellness examination     last seen 2 weeks ago       Past Surgical  History:     Past Surgical History:   Procedure Laterality Date    ABDOMEN SURGERY  1976    benign tumor removed near remaining ovary, 1.5 pounds    APPENDECTOMY  1968    appendix ruptured, developed peritonitis    BACK SURGERY      BUNIONECTOMY Left     along with calcium deposits removed    [address] [facility]  2005    negative    CERVICAL FUSION  05/21/2021    POSTERIOR C3-6 LAMINECTOMY, PARTIAL C7 LAMINECTOMY, FUSION C3-C6, SILVERCORD    CERVICAL FUSION N/A 5/21/2021    POSTERIOR C3-6 LAMINECTOMY, PARTIAL C7 LAMINECTOMY, FUSION C3-C6, SILVERCORD performed by Isidro Redman DO at [address]    12 INCHES REMOVED D/T OBSTRUCTION    COLONOSCOPY      CYST REMOVAL Right     right facial    HYSTERECTOMY (CERVIX STATUS UNKNOWN)  1973    taken as a result of recurring cysts    LUMBAR FUSION N/A 02/10/2020    LUMBAR L4-5 POSTERIOR  DECOMPRESSION INSTRUMENTATION FUSION WCEMENT AUGMENTATION/ performed by Amelie Quintero MD at [facility] N/A 06/17/2020    L5-S1 PLIF L4-L5 REVISION performed by Amelie Quintero MD at [address] [place] [address]  08/14/2014    [address]    UNILATERAL due to cyst    OVARY REMOVAL  1971    partial, due to cyst    SINUS SURGERY  2004    UPPER GASTROINTESTINAL ENDOSCOPY N/A 05/31/2019    EGD ESOPHAGOGASTRODUODENOSCOPY performed by Eric Castano Fady Abdalla MD at [address] [facility] N/A 2019    EGD BIOPSY performed by Rogelio Jerome MD at [address] [facility] N/A 2019    EGD BIOPSY performed by Luz Carreon MD at [address] Left 2019    WRIST OPEN REDUCTION INTERNAL FIXATION performed by Cary Runner, MD at [address] OR       Medications:      sodium chloride flush  5-40 mL IntraVENous 2 times per day    heparin (porcine)  5,000 Units SubCUTAneous BID    pantoprazole (PROTONIX) 40 mg injection  40 mg IntraVENous Daily    atorvastatin  40 mg Oral Nightly    busPIRone  5 mg Oral TID    citalopram  20 mg Oral Daily    ferrous sulfate  325 mg Oral BID    pramipexole  0.5 mg Oral Nightly       Social History:     Social History     Socioeconomic History    Marital status:      Spouse name: Not on file    Number of children: Not on file    Years of education: Not on file    Highest education level: Not on file   Occupational History    Occupation: retired   Tobacco Use    Smoking status: Former Smoker     Packs/day: 0.50     Years: 20.00     Pack years: 10.00     Types: Cigarettes     Start date: 1995     Quit date: 2017     Years since quittin.0    Smokeless tobacco: Never Used   Vaping Use    Vaping Use: Never used   Substance and Sexual Activity    Alcohol use: No     Alcohol/week: 0.0 standard drinks    Drug use: No    Sexual activity: Not on file   Other Topics Concern    Not on file   Social History Narrative    Not on file     Social Determinants of Health     Financial Resource Strain:     Difficulty of Paying Living Expenses: Not on file   Food Insecurity:     Worried About Running Out of Food in the Last Year: Not on file    Raghavendra of Food in the Last Year: Not on file   Transportation Needs:     Lack of Transportation (Medical): Not on file    Lack of Transportation (Non-Medical):  Not on file   Physical Activity:     Days of Exercise per Week: Not on file    Minutes of Exercise per Session: Not on file   Stress:     Feeling of Stress : Not on file   Social Connections:     Frequency of Communication with Friends and Family: Not on file    Frequency of Social Gatherings with Friends and Family: Not on file    Attends Congregational Services: Not on file    Active Member of Clubs or Organizations: Not on file    Attends Club or Organization Meetings: Not on file    Marital Status: Not on file   Intimate Partner Violence:     Fear of Current or Ex-Partner: Not on file    Emotionally Abused: Not on file    Physically Abused: Not on file    Sexually Abused: Not on file   Housing Stability:     Unable to Pay for Housing in the Last Year: Not on file    Number of [place] in the Last Year: Not on file    Unstable Housing in the Last Year: Not on file       Family History:     Family History   Problem Relation Age of Onset    Stroke Mother     Diabetes Mother     Heart Disease Mother     High Blood Pressure Mother     Heart Disease Father     Heart Disease Brother     High Blood Pressure Brother     Heart Disease Maternal Grandmother     High Blood Pressure Sister         Allergies:   Bactrim [sulfamethoxazole-trimethoprim], Codeine, Diazepam, Meperidine hcl, and Seasonal     Review of Systems:   Constitutional: No fevers or chills. No systemic complaints  Head: No headaches  Eyes: No double vision or blurry vision. No conjunctival inflammation. R orbit echymotic. ENT: No sore throat or runny nose. Cardiovascular: No chest pain or palpitations. No shortness of breath. No [place]  Lung: No shortness of breath or cough. No sputum production  Abdomen: No nausea, vomiting, diarrhea, or abdominal pain. Maria [place] No cramps. Genitourinary: No increased urinary frequency, or dysuria. No hematuria. No suprapubic or CVA pain  Musculoskeletal: No muscle aches or pains.  No joint effusions, swelling or deformities  Hematologic: No bleeding or bruising. Neurologic: No headache, weakness, numbness, or tingling. Integument: No rash, no ulcers. 2-week hx of redness to RLE. Psychiatric: No depression. Endocrine: No polyuria, no polydipsia, no polyphagia. Physical Examination :     Patient Vitals for the past 8 hrs:   BP Temp Temp src Pulse Resp SpO2   07/11/22 0817 -- -- -- -- 19 --   07/11/22 0700 123/73 98.9 °F (37.2 °C) Oral 94 20 96 %     General Appearance: Awake, alert, and in no apparent distress  Head:  Normocephalic, right orbit ecchymotic from fall. Eyes: Pupils equal, round, reactive to light and accommodation; extraocular movements intact; sclera anicteric; conjunctivae pink. ENT: Oropharynx clear, without erythema, exudate, or thrush. No tenderness of sinuses. Mouth/throat: mucosa pink and moist.   Neck:Supple, without lymphadenopathy. No JVD, tracheal deviation. Pulmonary/Chest: Clear to auscultation, without wheezes, rales, or rhonchi. Cardiovascular: Regular rate and rhythm without murmurs, rubs, or gallops. Abdomen: Soft, non tender. Bowel sounds normal.   All four Extremities: No cyanosis, clubbing. 2+edema to RLE. Neurologic: No gross sensory or motor deficits. Skin: Warm and dry with good turgor. RLE pink, warm from mid-forefoot to mid-thigh. No open wounds, skin breaks, blisters. Medical Decision Making -Laboratory:   I have independently reviewed/ordered the following labs:    CBC with Differential:   Recent Labs     07/10/22  1327 07/11/22 0455   WBC 16.0* 14.6*   HGB 12.4 12.2   HCT 36.9 34.6*    225   LYMPHOPCT 5* 10*   MONOPCT 4 4     BMP:   Recent Labs     07/10/22  1327 07/11/22  0455    139   K 3.8 4.5    103   CO2 26 22   BUN 44* 44*   CREATININE 1.26* 1.52*     Hepatic Function Panel:   Recent Labs     07/11/22 0455   PROT 6.9   LABALBU 3.7   BILIDIR 0.17   IBILI 0.43   BILITOT 0.60   ALKPHOS 36   ALT 15   AST 30     No results for input(s): RPR in the last 72 hours.   No results for input(s): HIV in the last 72 hours. No results for input(s): BC in the last 72 hours. Lab Results   Component Value Date/Time    MUCUS NOT REPORTED 08/21/2019 10:00 PM    RBC 3.74 07/11/2022 04:55 AM    TRICHOMONAS NOT REPORTED 08/21/2019 10:00 PM    WBC 14.6 07/11/2022 04:55 AM    YEAST NOT REPORTED 08/21/2019 10:00 PM    TURBIDITY Clear 06/05/2022 03:40 PM     Lab Results   Component Value Date/Time    CREATININE 1.52 07/11/2022 04:55 AM    GLUCOSE 124 07/11/2022 04:55 AM       Medical Decision Making-Imaging:       Medical Decision Ygponz-Bzezehbh-Ndzih:       Medical Decision Making-Other:     Note:  Labs, medications, radiologic studies were reviewed with personal review of films  Large amounts of data were reviewed  Discussed with nursing Staff, Discharge planner  Infection Control and Prevention measures reviewed  All prior entries were reviewed  Administer medications as ordered  Prognosis: Good  Discharge planning reviewed  Follow up as outpatient. Thank you for allowing us to participate in the care of this patient. Please call with questions. MAIK Zimmerman - CNP    ATTESTATION:    I have discussed the case, including pertinent history and exam findings with the APRN. I have evaluated the  History, physical findings and pictures of the patient and the key elements of the encounter have been performed by me. I have reviewed the laboratory data, other diagnostic studies and discussed them with the APRN. I have updated the medical record where necessary. I agree with the assessment, plan and orders as documented by the APRN.     Cash Chaparro MD.    Pager: [phone] - Office: [phone]

## 2022-07-11 NOTE — CARE COORDINATION
CASE MANAGEMENT NOTE:    Admission Date:  7/10/2022 Duke Canavan is a 71 y.o.  female    Admitted for : Contusion of face, initial encounter [S00.83XA]  Closed fracture of body of sternum, initial encounter [S22.22XA]  Closed fracture of one rib of right side, initial encounter [S22.31XA]  Fracture of body of sternum, initial encounter for open fracture [S22.22XB]  Nondisplaced fracture of sternal end of left clavicle, initial encounter for closed fracture [S42.018A]    Met with:  Patient    PCP:  Dr. Froilan Garcia:  Angelina Henning Medicare      Is patient alert and oriented at time of discussion:  Yes    Current Residence/ Living Arrangements:  independently at home  W/           Current Services PTA:  Yes, VNS, HHC    Does patient go to outpatient dialysis: No  If yes, location and chair time: NA    Is patient agreeable to VNS: Yes    Freedom of choice provided:  Yes    List of [address] provided: No    VNS chosen:  Yes, Will continue w/ [address] notified, no referral needed    DME:  shaye cane, walker and shower chair, GB    Home Oxygen: No    Nebulizer: No    CPAP/BIPAP: No    Supplier: N/A    Potential Assistance Needed: Yes, Wants Johnathan, LSW notified    SNF needed: No    Freedom of choice and list provided: NA    Pharmacy:  [facility]       Is patient currently receiving oral anticoagulation therapy? No    Is the Patient an [facility] with Readmission Risk Score greater than 14%? Yes  If yes, pt needs a follow up appointment made within 7 days. Family Members/Caregivers that pt would like involved in their care:    Yes    If yes, list name here:  , Justen Adjutant, Daughter José Antonio Rockwell    Transportation Provider:               Discharge Plan:  7/11/22 [facility] Medicare Pt. Lives in an apt w/ 1 step w/ . DME- Donnell Johnson, [place], [place]. Current w/ VNS, [facility], Notified, No Referral needed. Eliquis PTA. WBC 14.6.  GB US,

## 2022-07-11 NOTE — CONSULTS
[facility]    CONSULTATION / HISTORY AND PHYSICAL EXAMINATION            Date:   7/11/2022  Patient name:  Mitzi De La Torre  Date of admission:  7/10/2022 12:48 PM  MRN:   [id number]  Account:  [de-identified]  YOB: 1950  PCP:    Kayla Myles MD  Room:   2117/2117-01  Code Status:    Full Code    Physician Requesting Consult: Jaun Liu MD    Reason for Consult:  Medical management    Chief Complaint:     Chief Complaint   Patient presents with    Fall    Head Injury    Shortness of Breath    Hip Pain     left       History Obtained From:     Patient, EMR, nursing staff    History of Present Illness:     78-year-old female presenting with a fall which happened 2 days ago  Resulted in head injury to right side of head patient is on blood thinners-CT head negative in ER  Complaining of left hip pain at presentation unclear onset    Imaging in ER also showed right lateral 10th rib fracture, subacute sternal body fracture, multiple old left-sided rib fractures    Medical history includes CAD, CHF, GERD, hypertension, hyperlipidemia, prediabetes, history of recurrent DVT          Past Medical History:     Past Medical History:   Diagnosis Date    Allergic rhinitis, cause unspecified     Back pain     lumbar    Bowel obstruction ([facility] [address]     history of due to scar tissue, resolved non-surgically    C. difficile diarrhea     CAD (coronary artery disease)     no stent needed per pt.  Dr. Nghia Schilling did cath at  2005    Cardiac murmur     Cellulitis     left leg    Cellulitis 2017 August    leg left leg/bug bite    Cerebral artery occlusion with cerebral infarction [facility])     TIA 2014    COVID-19     ONE YR AGO IN 4/25/2020 fever and cough    Diverticulosis of colon (without mention of hemorrhage)     GERD (gastroesophageal reflux disease)     GERD (gastroesophageal reflux disease)     on rx    History of blood transfusion     approx 2020        History of CHF (congestive heart failure)     History of MI (myocardial infarction) 2005    thought due to a blood clot    History of ovarian cyst 1970    had oopherectomy holly    History of peritonitis 1968    due to ruptured appendix age 13    HTN (hypertension)     Hx of blood clots     right leg    Hyperlipidemia     Intestinal or peritoneal adhesions with obstruction (postoperative) (postinfection) ([facility] [address]     Kidney infection     renal failure/sepsis/spider bite    Lateral epicondylitis  of elbow     MDRO (multiple drug resistant organisms) resistance     c diff    Muscle strain     right posterior shoulder    Other abnormal glucose     PONV (postoperative nausea and vomiting)     dry heaves    Pre-diabetes     Restless legs syndrome (RLS)     Snores     no cpap    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)     TIA (transient ischemic attack) 2014    Uses walker     Vitamin D deficiency     Wears glasses     Wellness examination     last seen 2 weeks ago        Past Surgical History:     Past Surgical History:   Procedure Laterality Date    ABDOMEN SURGERY  1976    benign tumor removed near remaining ovary, 1.5 pounds    APPENDECTOMY  1968    appendix ruptured, developed peritonitis    BACK SURGERY      BUNIONECTOMY Left     along with calcium deposits removed   [facility]  2005    negative    CERVICAL FUSION  05/21/2021    POSTERIOR C3-6 LAMINECTOMY, PARTIAL C7 LAMINECTOMY, FUSION C3-C6, SILVERCORD    CERVICAL FUSION N/A 5/21/2021    POSTERIOR C3-6 LAMINECTOMY, PARTIAL C7 LAMINECTOMY, FUSION C3-C6, SILVERCORD performed by Ferdinand Raymundo DO at [address]    12 INCHES REMOVED D/T OBSTRUCTION    COLONOSCOPY      CYST REMOVAL Right     right facial    HYSTERECTOMY (CERVIX STATUS UNKNOWN)  1973    taken as a result of recurring cysts    LUMBAR FUSION N/A 02/10/2020    LUMBAR L4-5 POSTERIOR  DECOMPRESSION INSTRUMENTATION FUSION WCEMENT AUGMENTATION/ performed by Bernadette Mejia MD at [address] N/A 06/17/2020    L5-S1 PLIF L4-L5 REVISION performed by Bernadette Mejia MD at [facility]  08/14/2014    FESS    OVARY REMOVAL  1970    UNILATERAL due to cyst    OVARY REMOVAL  1971    partial, due to cyst    SINUS SURGERY  2004    UPPER GASTROINTESTINAL ENDOSCOPY N/A 05/31/2019    EGD ESOPHAGOGASTRODUODENOSCOPY performed by Jaclyn Lubin MD at [address] [facility] N/A 08/05/2019    EGD BIOPSY performed by Francisco Mason MD at [address] [facility] N/A 08/23/2019    EGD BIOPSY performed by Jaclyn Lubin MD at [address] [facility] 03/05/2019    WRIST OPEN REDUCTION INTERNAL FIXATION performed by Page Abbott MD at [address]        Medications Prior to Admission:     Prior to Admission medications    Medication Sig Start Date End Date Taking? Authorizing Provider   busPIRone (BUSPAR) 5 MG tablet Take 1 tablet by mouth 3 times daily 7/5/22   Lizz Bishop MD   pramipexole (MIRAPEX) 0.5 MG tablet Take 1 tablet by mouth nightly 7/5/22   Lizz Bishop MD   traZODone (DESYREL) 50 MG tablet TAKE 1 TABLET BY MOUTH EVERY NIGHT AS NEEDED FOR SLEEP 6/29/22   Lizz Bishop MD   furosemide (LASIX) 20 MG tablet Take 1 tablet by mouth daily 6/29/22   Lizz Bishop MD   citalopram (CELEXA) 20 MG tablet Take 1 tablet by mouth daily 6/27/22   Lizz Bishop MD   gabapentin (NEURONTIN) 100 MG capsule Take 2 capsules by mouth 2 times daily for 30 days.  6/8/22 7/8/22  Gideon Hopson MD   fenofibrate micronized (LOFIBRA) 134 MG capsule Take 1 capsule by mouth every morning (before breakfast) 5/23/22   Lizz Bishop MD   apixaban (ELIQUIS) 5 MG TABS tablet Take 1 tablet by mouth 2 times daily 5/2/22   Lizz Bishop MD   albuterol-ipratropium (COMBIVENT RESPIMAT)  MCG/ACT AERS inhaler Inhale 1 puff into the lungs every 6 hours as needed for Wheezing or Shortness of Breath 4/10/22   Scarlet Griffin MD   atorvastatin (LIPITOR) 40 MG tablet Take 1 tablet by mouth nightly 4/10/22   Scarlet Griffin MD   carvedilol (COREG) 12.5 MG tablet Take 1 tablet by mouth 2 times daily 4/10/22   Scarlet Griffin MD   amLODIPine (NORVASC) 5 MG tablet Take 1 tablet by mouth daily 4/10/22   Scarlet Griffin MD   acetaminophen (TYLENOL) 325 MG tablet Take 2 tablets by mouth every 4 hours as needed for Pain 4/7/22   Scarlet Griffin MD   melatonin 3 MG TABS tablet Take 2 tablets by mouth nightly as needed (insomnia) 4/7/22   Scarlet Griffin MD   nitroGLYCERIN (NITROSTAT) 0.4 MG SL tablet Place 1 tablet under the tongue every 5 minutes as needed for Chest pain 9/1/21   Paxton Lopez MD   cyanocobalamin ([employer] VITAMIN B12) 1000 MCG tablet Take 1 tablet by mouth daily  Patient taking differently: Take 1,000 mcg by mouth at bedtime  12/3/20   Paxton Lopez MD   ferrous sulfate (IRON 325) 325 (65 Fe) MG tablet Take 1 tablet by mouth 2 times daily 7/2/20   Zoltan Quintana MD   VITAMIN D, ERGOCALCIFEROL, PO Take by mouth nightly   Patient not taking: Reported on 7/10/2022    Historical Provider, MD   Lancets MISC 1 each by Does not apply route daily  Patient not taking: Reported on 6/27/2022 10/10/19   Latasha Vargas MD   blood glucose monitor strips Test 2 times a day & as needed for symptoms of irregular blood glucose. Patient not taking: Reported on 6/27/2022 10/10/19   Julio Schwartz MD   Calcium Carbonate-Vitamin D (CALCIUM 500/D) 500-125 MG-UNIT TABS Take 1 tablet by mouth nightly     Historical Provider, MD        Allergies:     Bactrim [sulfamethoxazole-trimethoprim], Codeine, Diazepam, Meperidine hcl, and Seasonal    Social History:     Tobacco:    reports that she quit smoking about 5 years ago. Her smoking use included cigarettes. She started smoking about 26 years ago. She has a 10.00 pack-year smoking history.  She has never used smokeless tobacco.  Alcohol:      reports no history of alcohol use. Drug Use:  reports no history of drug use. Family History:     Family History   Problem Relation Age of Onset    Stroke Mother     Diabetes Mother     Heart Disease Mother     High Blood Pressure Mother     Heart Disease Father     Heart Disease Brother     High Blood Pressure Brother     Heart Disease Maternal Grandmother     High Blood Pressure Sister        Review of Systems:     Positive and Negative as described in HPI. CONSTITUTIONAL:  negative for fevers, chills, sweats, fatigue, weight loss  HEENT: Positive for bruising right side forehead negative for vision, hearing changes, runny nose, throat pain  RESPIRATORY: Positive for difficulty taking deep breaths due to pain and bruising, negative for shortness of breath, cough, congestion, wheezing. CARDIOVASCULAR:  negative for chest pain, palpitations. GASTROINTESTINAL:  negative for nausea, vomiting, diarrhea, constipation, change in bowel habits, abdominal pain   GENITOURINARY:  negative for difficulty of urination, burning with urination, frequency   INTEGUMENT:  negative for rash, skin lesions, easy bruising   HEMATOLOGIC/LYMPHATIC:  negative for swelling/edema   ALLERGIC/IMMUNOLOGIC:  negative for urticaria , itching  ENDOCRINE:  negative increase in drinking, increase in urination, hot or cold intolerance  MUSCULOSKELETAL: Multiple bruising from recent fall, negative joint pains, muscle aches, swelling of joints  NEUROLOGICAL:  negative for headaches, dizziness, lightheadedness, numbness, pain, tingling extremities      Physical Exam:     /73   Pulse 94   Temp 98.9 °F (37.2 °C) (Oral)   Resp 19   Ht 5' 3\" (1.6 m)   Wt 185 lb 13.6 oz (84.3 kg)   SpO2 96%   BMI 32.92 kg/m²   Temp (24hrs), Av.4 °F (36.9 °C), Min:97.9 °F (36.6 °C), Max:99 °F (37.2 °C)    No results for input(s): POCGLU in the last 72 hours.     Intake/Output Summary (Last 24 hours) at k/uL    Metamyelocytes Absolute 0.29 (H) 0 k/uL    Morphology Normal    Basic Metabolic Panel    Collection Time: 07/11/22  4:55 AM   Result Value Ref Range    Glucose 124 (H) 70 - 99 mg/dL    BUN 44 (H) 8 - 23 mg/dL    CREATININE 1.52 (H) 0.50 - 0.90 mg/dL    Calcium 8.7 8.6 - 10.4 mg/dL    Sodium 139 135 - 144 mmol/L    Potassium 4.5 3.7 - 5.3 mmol/L    Chloride 103 98 - 107 mmol/L    CO2 22 20 - 31 mmol/L    Anion Gap 14 9 - 17 mmol/L    GFR Non-African American 34 (L) >60 mL/min    GFR  41 (L) >60 mL/min    GFR Comment         Hepatic Function Panel    Collection Time: 07/11/22  4:55 AM   Result Value Ref Range    Albumin 3.7 3.5 - 5.2 g/dL    Alkaline Phosphatase 36 35 - 104 U/L    ALT 15 5 - 33 U/L    AST 30 <32 U/L    Total Bilirubin 0.60 0.3 - 1.2 mg/dL    Bilirubin, Direct 0.17 <0.31 mg/dL    Bilirubin, Indirect 0.43 0.00 - 1.00 mg/dL    Total Protein 6.9 6.4 - 8.3 g/dL   Lipase    Collection Time: 07/11/22  4:55 AM   Result Value Ref Range    Lipase 24 13 - 60 U/L       Imaging/Diagonstics:  Recent data reviewed    Assessment :      Primary Problem  Fracture of body of sternum, initial encounter for open fracture    Active Hospital Problems    Diagnosis Date Noted    Fracture of body of sternum, initial encounter for open fracture [S22.22XB] 07/10/2022     Priority: Medium    Nondisplaced fracture of sternal end of left clavicle, initial encounter for closed fracture [S42.018A] 07/10/2022     Priority: Medium       Plan:       Multiple fractures- trauma surgery consulted  BIN creatinine 1.52, baseline 0.9 continue with gentle hydration  Chronic diastolic CHF-grade 1 mild diastolic dysfunction EF 72% on last echo March 2022-Coreg, amlodipine, Lasix currently on hold since blood pressure low normal  Patient is on Eliquis for history of recurrent DVT- currently on hold  Abdominal pain, gallstone present with distended gallbladder liver enzymes normal- HIDA scan  Today  Leg cellulitis-appears

## 2022-07-11 NOTE — PROGRESS NOTES
Patient was seen and examined. Case discussed with Charles Chandra. Infectious disease consultation noted. Patient was placed on IV Rocephin for lower extremity cellulitis. Imaging studies noted. HIDA scan was negative for acute cholecystitis. Ultrasound showed cholelithiasis nothing acute. Creatinine is 1.5. LFTs are normal.  WBC count is slightly improved. Hemoglobin 12.2. Bilateral lower extremity venous Doppler. IV antibiotics per infectious disease. Resume Eliquis. Discontinue heparin subcu once Eliquis is started. Charles Chandra has graciously excepted care of the patient is admitting physician at this time. No acute general surgical intervention at this time. Discussed with nurse taking care of the patient.

## 2022-07-11 NOTE — PLAN OF CARE
Problem: Discharge Planning  Goal: Discharge to home or other facility with appropriate resources  7/11/2022 1614 by Sonya Ace RN  Outcome: Progressing     Problem: Pain  Goal: Verbalizes/displays adequate comfort level or baseline comfort level  7/11/2022 1614 by Sonya Ace RN  Outcome: Progressing  PRN pain medication given this shift along with nonpharm measures. Will continue to monitor.      Problem: Safety - Adult  Goal: Free from fall injury  7/11/2022 1614 by Sonya Ace RN  Outcome: Progressing     Problem: ABCDS Injury Assessment  Goal: Absence of physical injury  7/11/2022 1614 by Sonya Ace RN  Outcome: Progressing

## 2022-07-11 NOTE — PROGRESS NOTES
Dr. Artem Beck notified patients right leg looks red and swollen (swelling is equal bilterally). She does state she was admitted last month for cellulitis on that leg. pedal pulse is +2, leg is warm and red. Order received and placed for ID consult. ID NP notifid of consult.

## 2022-07-11 NOTE — ACP (ADVANCE CARE PLANNING)
Advance Care Planning     Advance Care Planning Activator (Inpatient)  Conversation Note      Date of ACP Conversation: 7/11/2022     Conversation Conducted with: Patient with Decision Making Capacity    ACP Activator: Meghan Nails RN        Health Care Decision Maker:     Current Designated Health Care Decision Maker:     Primary Decision Maker: Caroline Wright - [phone]    Secondary Decision Maker: Bill Perla - [phone]      Care Preferences    Ventilation: \"If you were in your present state of health and suddenly became very ill and were unable to breathe on your own, what would your preference be about the use of a ventilator (breathing machine) if it were available to you? \"      Would the patient desire the use of ventilator (breathing machine)?: yes    \"If your health worsens and it becomes clear that your chance of recovery is unlikely, what would your preference be about the use of a ventilator (breathing machine) if it were available to you? \"     Would the patient desire the use of ventilator (breathing machine)?: No      Resuscitation  \"CPR works best to restart the heart when there is a sudden event, like a heart attack, in someone who is otherwise healthy. Unfortunately, CPR does not typically restart the heart for people who have serious health conditions or who are very sick. \"    \"In the event your heart stopped as a result of an underlying serious health condition, would you want attempts to be made to restart your heart (answer \"yes\" for attempt to resuscitate) or would you prefer a natural death (answer \"no\" for do not attempt to resuscitate)? \" yes       [] Yes   [] No   Educated Patient / Zaki Whittaker regarding differences between Advance Directives and portable DNR orders.     Length of ACP Conversation in minutes:      Conversation Outcomes:  [x] ACP discussion completed  [] Existing advance directive reviewed with patient; no changes to patient's previously recorded wishes  [] New Advance Directive completed  [] Portable Do Not Rescitate prepared for Provider review and signature  [] POLST/POST/MOLST/MOST prepared for Provider review and signature      Follow-up plan:    [] Schedule follow-up conversation to continue planning  [] Referred individual to Provider for additional questions/concerns   [] Advised patient/agent/surrogate to review completed ACP document and update if needed with changes in condition, patient preferences or care setting    [x] This note routed to one or more involved healthcare providers

## 2022-07-12 ENCOUNTER — APPOINTMENT (OUTPATIENT)
Dept: VASCULAR LAB | Age: 72
DRG: 872 | End: 2022-07-12
Payer: MEDICARE

## 2022-07-12 LAB
ABSOLUTE EOS #: 0 K/UL (ref 0–0.4)
ABSOLUTE LYMPH #: 0.8 K/UL (ref 1–4.8)
ABSOLUTE MONO #: 0.4 K/UL (ref 0.1–1.3)
ANION GAP SERPL CALCULATED.3IONS-SCNC: 13 MMOL/L (ref 9–17)
BASOPHILS # BLD: 0 % (ref 0–2)
BASOPHILS ABSOLUTE: 0 K/UL (ref 0–0.2)
BUN BLDV-MCNC: 48 MG/DL (ref 8–23)
CALCIUM SERPL-MCNC: 8.1 MG/DL (ref 8.6–10.4)
CHLORIDE BLD-SCNC: 105 MMOL/L (ref 98–107)
CO2: 22 MMOL/L (ref 20–31)
CREAT SERPL-MCNC: 1.4 MG/DL (ref 0.5–0.9)
EOSINOPHILS RELATIVE PERCENT: 0 % (ref 0–4)
GFR AFRICAN AMERICAN: 45 ML/MIN
GFR NON-AFRICAN AMERICAN: 37 ML/MIN
GFR SERPL CREATININE-BSD FRML MDRD: ABNORMAL ML/MIN/{1.73_M2}
GLUCOSE BLD-MCNC: 126 MG/DL (ref 70–99)
GLUCOSE BLD-MCNC: 142 MG/DL (ref 65–105)
HCT VFR BLD CALC: 34.9 % (ref 36–46)
HEMOGLOBIN: 11.2 G/DL (ref 12–16)
LYMPHOCYTES # BLD: 7 % (ref 24–44)
MCH RBC QN AUTO: 30.9 PG (ref 26–34)
MCHC RBC AUTO-ENTMCNC: 32 G/DL (ref 31–37)
MCV RBC AUTO: 96.5 FL (ref 80–100)
MONOCYTES # BLD: 3 % (ref 1–7)
PDW BLD-RTO: 13.9 % (ref 11.5–14.9)
PLATELET # BLD: 205 K/UL (ref 150–450)
PMV BLD AUTO: 7.9 FL (ref 6–12)
POTASSIUM SERPL-SCNC: 3.6 MMOL/L (ref 3.7–5.3)
RBC # BLD: 3.62 M/UL (ref 4–5.2)
SEG NEUTROPHILS: 90 % (ref 36–66)
SEGMENTED NEUTROPHILS ABSOLUTE COUNT: 10.5 K/UL (ref 1.3–9.1)
SODIUM BLD-SCNC: 140 MMOL/L (ref 135–144)
WBC # BLD: 11.7 K/UL (ref 3.5–11)

## 2022-07-12 PROCEDURE — G0378 HOSPITAL OBSERVATION PER HR: HCPCS

## 2022-07-12 PROCEDURE — 87040 BLOOD CULTURE FOR BACTERIA: CPT

## 2022-07-12 PROCEDURE — 80048 BASIC METABOLIC PNL TOTAL CA: CPT

## 2022-07-12 PROCEDURE — 93970 EXTREMITY STUDY: CPT

## 2022-07-12 PROCEDURE — 6370000000 HC RX 637 (ALT 250 FOR IP): Performed by: SURGERY

## 2022-07-12 PROCEDURE — 6370000000 HC RX 637 (ALT 250 FOR IP): Performed by: INTERNAL MEDICINE

## 2022-07-12 PROCEDURE — 85025 COMPLETE CBC W/AUTO DIFF WBC: CPT

## 2022-07-12 PROCEDURE — 99232 SBSQ HOSP IP/OBS MODERATE 35: CPT | Performed by: NURSE PRACTITIONER

## 2022-07-12 PROCEDURE — 99222 1ST HOSP IP/OBS MODERATE 55: CPT | Performed by: STUDENT IN AN ORGANIZED HEALTH CARE EDUCATION/TRAINING PROGRAM

## 2022-07-12 PROCEDURE — 99232 SBSQ HOSP IP/OBS MODERATE 35: CPT | Performed by: INTERNAL MEDICINE

## 2022-07-12 PROCEDURE — 82947 ASSAY GLUCOSE BLOOD QUANT: CPT

## 2022-07-12 PROCEDURE — 6370000000 HC RX 637 (ALT 250 FOR IP): Performed by: NURSE PRACTITIONER

## 2022-07-12 PROCEDURE — 6360000002 HC RX W HCPCS: Performed by: NURSE PRACTITIONER

## 2022-07-12 PROCEDURE — 6360000002 HC RX W HCPCS: Performed by: SURGERY

## 2022-07-12 PROCEDURE — C9113 INJ PANTOPRAZOLE SODIUM, VIA: HCPCS | Performed by: SURGERY

## 2022-07-12 PROCEDURE — A4216 STERILE WATER/SALINE, 10 ML: HCPCS | Performed by: SURGERY

## 2022-07-12 PROCEDURE — 2580000003 HC RX 258: Performed by: SURGERY

## 2022-07-12 PROCEDURE — 2580000003 HC RX 258: Performed by: NURSE PRACTITIONER

## 2022-07-12 PROCEDURE — 36415 COLL VENOUS BLD VENIPUNCTURE: CPT

## 2022-07-12 PROCEDURE — 96376 TX/PRO/DX INJ SAME DRUG ADON: CPT

## 2022-07-12 PROCEDURE — 96366 THER/PROPH/DIAG IV INF ADDON: CPT

## 2022-07-12 RX ADMIN — BUSPIRONE HYDROCHLORIDE 5 MG: 5 TABLET ORAL at 20:28

## 2022-07-12 RX ADMIN — APIXABAN 5 MG: 5 TABLET, FILM COATED ORAL at 08:38

## 2022-07-12 RX ADMIN — SODIUM CHLORIDE: 9 INJECTION, SOLUTION INTRAVENOUS at 19:02

## 2022-07-12 RX ADMIN — FENTANYL CITRATE 50 MCG: 50 INJECTION, SOLUTION INTRAMUSCULAR; INTRAVENOUS at 22:03

## 2022-07-12 RX ADMIN — FENTANYL CITRATE 50 MCG: 50 INJECTION, SOLUTION INTRAMUSCULAR; INTRAVENOUS at 04:36

## 2022-07-12 RX ADMIN — SODIUM CHLORIDE: 9 INJECTION, SOLUTION INTRAVENOUS at 04:07

## 2022-07-12 RX ADMIN — FENTANYL CITRATE 50 MCG: 50 INJECTION, SOLUTION INTRAMUSCULAR; INTRAVENOUS at 16:21

## 2022-07-12 RX ADMIN — FENTANYL CITRATE 50 MCG: 50 INJECTION, SOLUTION INTRAMUSCULAR; INTRAVENOUS at 06:23

## 2022-07-12 RX ADMIN — FENTANYL CITRATE 50 MCG: 50 INJECTION, SOLUTION INTRAMUSCULAR; INTRAVENOUS at 08:38

## 2022-07-12 RX ADMIN — CITALOPRAM HYDROBROMIDE 20 MG: 20 TABLET ORAL at 08:38

## 2022-07-12 RX ADMIN — BUSPIRONE HYDROCHLORIDE 5 MG: 5 TABLET ORAL at 14:10

## 2022-07-12 RX ADMIN — FENTANYL CITRATE 50 MCG: 50 INJECTION, SOLUTION INTRAMUSCULAR; INTRAVENOUS at 19:44

## 2022-07-12 RX ADMIN — ACETAMINOPHEN 650 MG: 650 SUPPOSITORY RECTAL at 00:30

## 2022-07-12 RX ADMIN — FERROUS SULFATE TAB 325 MG (65 MG ELEMENTAL FE) 325 MG: 325 (65 FE) TAB at 20:28

## 2022-07-12 RX ADMIN — FENTANYL CITRATE 50 MCG: 50 INJECTION, SOLUTION INTRAMUSCULAR; INTRAVENOUS at 00:14

## 2022-07-12 RX ADMIN — CEFTRIAXONE SODIUM 1000 MG: 1 INJECTION, POWDER, FOR SOLUTION INTRAMUSCULAR; INTRAVENOUS at 14:16

## 2022-07-12 RX ADMIN — PRAMIPEXOLE DIHYDROCHLORIDE 0.5 MG: 0.25 TABLET ORAL at 20:28

## 2022-07-12 RX ADMIN — PANTOPRAZOLE SODIUM 40 MG: 40 INJECTION, POWDER, FOR SOLUTION INTRAVENOUS at 08:38

## 2022-07-12 RX ADMIN — FENTANYL CITRATE 50 MCG: 50 INJECTION, SOLUTION INTRAMUSCULAR; INTRAVENOUS at 02:38

## 2022-07-12 RX ADMIN — FENTANYL CITRATE 50 MCG: 50 INJECTION, SOLUTION INTRAMUSCULAR; INTRAVENOUS at 14:10

## 2022-07-12 RX ADMIN — BUSPIRONE HYDROCHLORIDE 5 MG: 5 TABLET ORAL at 08:38

## 2022-07-12 RX ADMIN — APIXABAN 5 MG: 5 TABLET, FILM COATED ORAL at 20:27

## 2022-07-12 RX ADMIN — ATORVASTATIN CALCIUM 40 MG: 40 TABLET, FILM COATED ORAL at 20:27

## 2022-07-12 RX ADMIN — FERROUS SULFATE TAB 325 MG (65 MG ELEMENTAL FE) 325 MG: 325 (65 FE) TAB at 08:38

## 2022-07-12 ASSESSMENT — ENCOUNTER SYMPTOMS
SHORTNESS OF BREATH: 1
ABDOMINAL PAIN: 0
BLURRED VISION: 0
DOUBLE VISION: 0

## 2022-07-12 ASSESSMENT — PAIN SCALES - GENERAL
PAINLEVEL_OUTOF10: 10
PAINLEVEL_OUTOF10: 9
PAINLEVEL_OUTOF10: 8
PAINLEVEL_OUTOF10: 10
PAINLEVEL_OUTOF10: 9
PAINLEVEL_OUTOF10: 10

## 2022-07-12 ASSESSMENT — PAIN DESCRIPTION - DESCRIPTORS
DESCRIPTORS: THROBBING

## 2022-07-12 ASSESSMENT — PAIN DESCRIPTION - LOCATION
LOCATION: CHEST;ABDOMEN;RIB CAGE
LOCATION: ABDOMEN
LOCATION: ABDOMEN

## 2022-07-12 ASSESSMENT — PAIN DESCRIPTION - ORIENTATION: ORIENTATION: RIGHT

## 2022-07-12 NOTE — PLAN OF CARE
Problem: Discharge Planning  Goal: Discharge to home or other facility with appropriate resources  7/12/2022 0344 by Littie Dakins, RN  Outcome: Progressing     Problem: Pain  Goal: Verbalizes/displays adequate comfort level or baseline comfort level  7/12/2022 0344 by Littie Dakins, RN  Outcome: Progressing  Flowsheets (Taken 7/12/2022 0344)  Verbalizes/displays adequate comfort level or baseline comfort level:   Encourage patient to monitor pain and request assistance   Assess pain using appropriate pain scale  Note: Pt medicated with pain medication prn. Assessed all pain characteristics including level, type, location, frequency, and onset. Non-pharmacologic interventions offered to pt as well. Pt states pain is tolerable at this time. Will continue to monitor. Problem: Safety - Adult  Goal: Free from fall injury  7/12/2022 0344 by Littie Dakins, RN  Outcome: Progressing  Note: Pt assessed as a fall risk this shift. Remains free from falls and accidental injury at this time. Fall precautions in place, including falling star sign and fall risk band on pt. Floor free from obstacles, and bed is locked and in lowest position. Adequate lighting provided. Pt encouraged to call before getting OOB for any need. Bed alarm activated. Will continue to monitor needs during hourly rounding, and reinforce education on use of call light.

## 2022-07-12 NOTE — PROGRESS NOTES
[facility] - Initial Consult Note  Today's Date and Time: 7/12/2022, 7:19 PM    Impression :   · RLE Erisepylas  · Cholelithiasis. · Leukocytosis  · Elevated CRP  · Recent fall. · DM  · CKD III  · Allergy to Sulfa-Confusion. Recommendations:   · Ceftriaxone 1 gm IV daily x 7 days. · Follow CBC and renal function closely. · BC x 2 today. · Supportive care. Medical Decision Making/Summary/Discussion:7/12/2022     · Pen G is ideal therapy. Not a good choice due to Na levels, pt. Decreased renal function,hx of CHF. · Small break in the skin noted to the right lateral calf. Scant amount of serous drainage. · Check blood cultures. Infection Control Recommendations   · [place] Precautions    Antimicrobial Stewardship Recommendations     · Simplification of therapy  · Targeted therapy    Coordination of Outpatient Care:   · Estimated Length of IV antimicrobials:TBD  · Patient will need Midline Catheter Insertion: TBD  · Patient will need PICC line Insertion:BD  · Patient will need: Home IV , [place],  SNF,  LTAC: TBD  · Patient will need outpatient wound care:    Chief complaint/reason for consultation:   · Possible RLE Cellulitis      History of Present Illness:   Nazia Calhoun is a 71y.o.-year-old female who was initially admitted on 7/10/2022. Patient seen at the request of     INITIAL HISTORY:  60-year-old female who presented to the ED s/p fall 2 days ago. Patient states she trying to open a box of food and she slipped and fell. She hit her right side on something, she is not sure what. Angel loss of consciousness. Associated symptoms include left hip pain, pain to her right rib cage and right upper abdomen. No associated symptoms of headache, neck or back pain. She is on blood thinners. CT results reviewed as below. Recent hospitalization 6/5-6/14 for BLE cellulitis.   Treated with IV Vancomycin, discharged on Doxycycline x 7 days.  Patient presents with BLE redness and swelling. Upon examination patient has erysepilas to the RLE, mid-forefoot to mid-thigh. Light pink in color. No open wounds or areas of skin breaks. Patient states she has had this for about 2-weeks. Denies any pain to the RLE. CURRENT EVALUATION 7/12/2022  BP (!) 148/60   Pulse 95   Temp 99.1 °F (37.3 °C) (Oral)   Resp 18   Ht 5' 3\" (1.6 m)   Wt 192 lb 10.9 oz (87.4 kg)   SpO2 93%   BMI 34.13 kg/m²   Seen and examined in PCU. Feeling better. Denies any fever, chills, n/v/d. C/o pain to broken rib. Small open area to right lateral calf. Scant amount of serous drainage. RLE remains pink, warm, swollen. WBC improving. Labs, X rays reviewed: 7/12/2022    BUN:44>44>48  Cr:1.26>1.25>1.52>1.40    WBC:16.0>14.6  Hb:  Plat: 622>567>182    CRP:166.1    Cultures:  Urine:  · 7/11 UA-Negative. Blood:  ·   Sputum :  ·   Wound:     MRSA Nares:       Imaging:  XR HIP 2-3 VW W PELVIS LEFT   Preliminary Result   No acute bony abnormality identified. CT CHEST ABDOMEN PELVIS W CONTRAST   Preliminary Result   No acute traumatic abnormality identified in the chest, abdomen, or pelvis. Subacute mildly displaced proximal sternal body fracture. Age-indeterminate buckle fracture of the right lateral 10th rib. Multiple   old left-sided rib fractures. Distended gallbladder containing a stone at the gallbladder neck. Postsurgical changes in the lower lumbar spine with chronic appearing height   loss of L5.           CT CERVICAL SPINE WO CONTRAST   Final Result   1. Kyphosis of the cervical spine centered at C6-C7. 2. Mild multilevel degenerative changes in the cervical spine primarily at   C5-C6. 3. No acute vertebral body height loss in the cervical spine. 4. Evidence of posterior spinal fusion from C3-C6. RECOMMENDATIONS:   Unavailable           CT FACIAL BONES WO CONTRAST   Final Result   No acute facial bone trauma. CT HEAD WO CONTRAST   Preliminary Result   No acute intracranial abnormality. 7/11 RUQ US  FINDINGS:   LIVER:  The liver demonstrates normal echogenicity without evidence of   intrahepatic biliary ductal dilatation. There is a paddle pedal flow in the   portal vein. Liver 17 cm in length. BILIARY SYSTEM:  Gallstones in the gallbladder. No wall thickening or   pericholecystic fluid. Common bile duct is within normal limits measuring 5.2 mm. RIGHT KIDNEY: The right kidney is grossly unremarkable without evidence of   hydronephrosis. PANCREAS:  Visualized portions of the pancreas are unremarkable. OTHER: No evidence of right upper quadrant ascites. Impression   Cholelithiasis. No acute findings. RECOMMENDATIONS:   Unavailable                   Discussed with patient, RN. I have personally reviewed the past medical history, past surgical history, medications, social history, and family history, and I have updated the database accordingly. Past Medical History:     Past Medical History:   Diagnosis Date    Allergic rhinitis, cause unspecified     Back pain     lumbar    Bowel obstruction (HCC)     history of due to scar tissue, resolved non-surgically    C. difficile diarrhea     CAD (coronary artery disease)     no stent needed per pt.  Dr. Viktoria Goldberg did cath at  2005    Cardiac murmur     Cellulitis     left leg    Cellulitis 2017 August    leg left leg/bug bite    Cerebral artery occlusion with cerebral infarction [facility])     TIA 2014    COVID-19     ONE YR AGO IN 4/25/2020 fever and cough    Diverticulosis of colon (without mention of hemorrhage)     GERD (gastroesophageal reflux disease)     GERD (gastroesophageal reflux disease)     on rx    History of blood transfusion     approx 2020        History of CHF (congestive heart failure)     History of MI (myocardial infarction) 2005    thought due to a blood clot    History of ovarian cyst 1970    had oopherectomy holly    History of peritonitis 1968    due to ruptured appendix age 13    HTN (hypertension)     Hx of blood clots     right leg    Hyperlipidemia     Intestinal or peritoneal adhesions with obstruction (postoperative) (postinfection) ([address]     Kidney infection     renal failure/sepsis/spider bite    Lateral epicondylitis  of elbow     MDRO (multiple drug resistant organisms) resistance     c diff    Muscle strain     right posterior shoulder    Other abnormal glucose     PONV (postoperative nausea and vomiting)     dry heaves    Pre-diabetes     Restless legs syndrome (RLS)     Snores     no cpap    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)     TIA (transient ischemic attack) 2014    Uses walker     Vitamin D deficiency     Wears glasses     Wellness examination     last seen 2 weeks ago       Past Surgical  History:     Past Surgical History:   Procedure Laterality Date    ABDOMEN SURGERY  1976    benign tumor removed near remaining ovary, 1.5 pounds    APPENDECTOMY  1968    appendix ruptured, developed peritonitis    BACK SURGERY      BUNIONECTOMY Left     along with calcium deposits removed   [address]  2005    negative    CERVICAL FUSION  05/21/2021    POSTERIOR C3-6 LAMINECTOMY, PARTIAL C7 LAMINECTOMY, FUSION C3-C6, SILVERCORD    CERVICAL FUSION N/A 5/21/2021    POSTERIOR C3-6 LAMINECTOMY, PARTIAL C7 LAMINECTOMY, FUSION C3-C6, SILVERCORD performed by Louisa Cifuentes DO at [address]    12 INCHES REMOVED D/T OBSTRUCTION    COLONOSCOPY      CYST REMOVAL Right     right facial    HYSTERECTOMY (CERVIX STATUS UNKNOWN)  1973    taken as a result of recurring cysts    LUMBAR FUSION N/A 02/10/2020    LUMBAR L4-5 POSTERIOR  DECOMPRESSION INSTRUMENTATION FUSION WCEMENT AUGMENTATION/ performed by Silverio Gasca MD at [address] [facility] N/A 06/17/2020    L5-S1 PLIF L4-L5 REVISION performed by [employer] Strain:     Difficulty of Paying Living Expenses: Not on file   Food Insecurity:     Worried About Running Out of Food in the Last Year: Not on file    Raghavendra of Food in the Last Year: Not on file   Transportation Needs:     Lack of Transportation (Medical): Not on file    Lack of Transportation (Non-Medical): Not on file   Physical Activity:     Days of Exercise per Week: Not on file    Minutes of Exercise per Session: Not on file   Stress:     Feeling of Stress : Not on file   Social Connections:     Frequency of Communication with Friends and Family: Not on file    Frequency of Social Gatherings with Friends and Family: Not on file    Attends Yazidism Services: Not on file    Active Member of [address] [place] [address] [employer] or Organizations: Not on file    Attends Club or Organization Meetings: Not on file    Marital Status: Not on file   Intimate Partner Violence:     Fear of Current or Ex-Partner: Not on file    Emotionally Abused: Not on file    Physically Abused: Not on file    Sexually Abused: Not on file   Housing Stability:     Unable to Pay for Housing in the Last Year: Not on file    Number of [place] in the Last Year: Not on file    Unstable Housing in the Last Year: Not on file       Family History:     Family History   Problem Relation Age of Onset    Stroke Mother     Diabetes Mother     Heart Disease Mother     High Blood Pressure Mother     Heart Disease Father     Heart Disease Brother     High Blood Pressure Brother     Heart Disease Maternal Grandmother     High Blood Pressure Sister         Allergies:   Bactrim [sulfamethoxazole-trimethoprim], Codeine, Diazepam, Meperidine hcl, and Seasonal     Review of Systems:   Constitutional: No fevers or chills. No systemic complaints  Head: No headaches  Eyes: No double vision or blurry vision. No conjunctival inflammation. R orbit echymotic. ENT: No sore throat or runny nose.   Cardiovascular: No chest pain or palpitations. No shortness of breath. No [place]  Lung: No shortness of breath or cough. No sputum production  Abdomen: No nausea, vomiting, diarrhea, or abdominal pain. [place] Red No cramps. Genitourinary: No increased urinary frequency, or dysuria. No hematuria. No suprapubic or CVA pain  Musculoskeletal:c/o pain to broken rib. Hematologic: No bleeding or bruising. Neurologic: No headache, weakness, numbness, or tingling. Integument: No rash, no ulcers. 2-week hx of redness to RLE. Psychiatric: No depression. Endocrine: No polyuria, no polydipsia, no polyphagia. Physical Examination :     Patient Vitals for the past 8 hrs:   BP Temp Temp src Pulse Resp SpO2   07/12/22 1830 (!) 148/60 99.1 °F (37.3 °C) Oral 95 18 93 %   07/12/22 1651 -- -- -- -- 18 --   07/12/22 1440 (!) 136/58 -- -- 97 16 --   07/12/22 1400 (!) 125/58 98.2 °F (36.8 °C) -- 89 18 94 %     General Appearance: Awake, alert, and in no apparent distress  Head:  Normocephalic, right orbit ecchymotic from fall. Eyes: Pupils equal, round, reactive to light and accommodation; extraocular movements intact; sclera anicteric; conjunctivae pink. ENT: Oropharynx clear, without erythema, exudate, or thrush. No tenderness of sinuses. Mouth/throat: mucosa pink and moist.   Neck:Supple, without lymphadenopathy. No JVD, tracheal deviation. Pulmonary/Chest: Clear to auscultation, without wheezes, rales, or rhonchi. Cardiovascular: Regular rate and rhythm without murmurs, rubs, or gallops. Abdomen: Soft, non tender. Bowel sounds normal.   All four Extremities: No cyanosis, clubbing. 2+edema to RLE. Neurologic: No gross sensory or motor deficits. Skin: Warm and dry with good turgor. RLE pink, warm from mid-forefoot to mid-thigh. Small skin break to right lateral calf. No induration.     Medical Decision Making -Laboratory:   I have independently reviewed/ordered the following labs:    CBC with Differential:   Recent Labs     07/11/22  0455 07/12/22  0500   WBC 14.6* 11.7*   HGB 12.2 11.2*   HCT 34.6* 34.9*    205   LYMPHOPCT 10* 7*   MONOPCT 4 3     BMP:   Recent Labs     07/11/22  0455 07/12/22  0500    140   K 4.5 3.6*    105   CO2 22 22   BUN 44* 48*   CREATININE 1.52* 1.40*     Hepatic Function Panel:   Recent Labs     07/11/22  0455   PROT 6.9   LABALBU 3.7   BILIDIR 0.17   IBILI 0.43   BILITOT 0.60   ALKPHOS 36   ALT 15   AST 30     No results for input(s): RPR in the last 72 hours. No results for input(s): HIV in the last 72 hours. No results for input(s): BC in the last 72 hours. Lab Results   Component Value Date/Time    MUCUS NOT REPORTED 08/21/2019 10:00 PM    RBC 3.62 07/12/2022 05:00 AM    TRICHOMONAS NOT REPORTED 08/21/2019 10:00 PM    WBC 11.7 07/12/2022 05:00 AM    YEAST NOT REPORTED 08/21/2019 10:00 PM    TURBIDITY Clear 07/11/2022 06:13 PM     Lab Results   Component Value Date/Time    CREATININE 1.40 07/12/2022 05:00 AM    GLUCOSE 126 07/12/2022 05:00 AM       Medical Decision Making-Imaging:       Medical Decision Dglroe-Ymnaobvr-Etmzs:       Medical Decision Making-Other:     Note:  · Labs, medications, radiologic studies were reviewed with personal review of films  · Large amounts of data were reviewed  · Discussed with nursing Staff, Discharge planner  · Infection Control and Prevention measures reviewed  · All prior entries were reviewed  · Administer medications as ordered  · Prognosis: Good  · Discharge planning reviewed  · Follow up as outpatient. Thank you for allowing us to participate in the care of this patient. Please call with questions.     Grazyna Quiñones, MAIK - CNP    [employer]/Office: [phone]

## 2022-07-12 NOTE — PROGRESS NOTES
[address]    OCCUPATIONAL THERAPY MISSED TREATMENT NOTE   INPATIENT   Date: 22  Patient Name: Rossana Cazares       Room:   MRN: [id number]   Account #: [de-identified]    : 1950  (71 y.o.)  Gender: female     REASON FOR MISSED TREATMENT:  Dopplers ordered to rule out DVT. Will await results prior to OT evaluation.        Iveth Ashby, OT

## 2022-07-12 NOTE — CONSULTS
Formerly [facility] Internal Medicine    CONSULTATION / HISTORY AND PHYSICAL EXAMINATION            Date:   7/12/2022  Patient name:  Cara Linares  Date of admission:  7/10/2022 12:48 PM  MRN:   [id number]  Account:  [de-identified]  YOB: 1950  PCP:    Padilla Price MD  Room:   2117/2117-01  Code Status:    Full Code    Physician Requesting Consult: Jazmine Neves MD    Reason for Consult:  Medical management    Chief Complaint:     Chief Complaint   Patient presents with    Fall    Head Injury    Shortness of Breath    Hip Pain     left       History Obtained From:     Patient, EMR, nursing staff    History of Present Illness:     70-year-old female presenting with a fall which happened 2 days ago  Resulted in head injury to right side of head patient is on blood thinners-CT head negative in ER  Complaining of left hip pain at presentation unclear onset    Imaging in ER also showed right lateral 10th rib fracture, subacute sternal body fracture, multiple old left-sided rib fractures    Medical history includes CAD, CHF, GERD, hypertension, hyperlipidemia, prediabetes, history of recurrent DVT          Past Medical History:     Past Medical History:   Diagnosis Date    Allergic rhinitis, cause unspecified     Back pain     lumbar    Bowel obstruction ([facility] [address]     history of due to scar tissue, resolved non-surgically    C. difficile diarrhea     CAD (coronary artery disease)     no stent needed per pt.  Dr. Markel Stahl did cath at  2005    Cardiac murmur     Cellulitis     left leg    Cellulitis 2017 August    leg left leg/bug bite    Cerebral artery occlusion with cerebral infarction [facility])     TIA 2014    COVID-19     ONE YR AGO IN 4/25/2020 fever and cough    Diverticulosis of colon (without mention of hemorrhage)     GERD (gastroesophageal reflux disease)     GERD (gastroesophageal reflux disease)     on rx    History of blood transfusion     approx 2020        History of CHF (congestive heart failure)     History of MI (myocardial infarction) 2005    thought due to a blood clot    History of ovarian cyst 1970    had oopherectomy holly    History of peritonitis 1968    due to ruptured appendix age 13    HTN (hypertension)     Hx of blood clots     right leg    Hyperlipidemia     Intestinal or peritoneal adhesions with obstruction (postoperative) (postinfection) ([facility] [address]     Kidney infection     renal failure/sepsis/spider bite    Lateral epicondylitis  of elbow     MDRO (multiple drug resistant organisms) resistance     c diff    Muscle strain     right posterior shoulder    Other abnormal glucose     PONV (postoperative nausea and vomiting)     dry heaves    Pre-diabetes     Restless legs syndrome (RLS)     Snores     no cpap    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)     TIA (transient ischemic attack) 2014    Uses walker     Vitamin D deficiency     Wears glasses     Wellness examination     last seen 2 weeks ago        Past Surgical History:     Past Surgical History:   Procedure Laterality Date    ABDOMEN SURGERY  1976    benign tumor removed near remaining ovary, 1.5 pounds    APPENDECTOMY  1968    appendix ruptured, developed peritonitis    BACK SURGERY      BUNIONECTOMY Left     along with calcium deposits removed   [address]  2005    negative    CERVICAL FUSION  05/21/2021    POSTERIOR C3-6 LAMINECTOMY, PARTIAL C7 LAMINECTOMY, FUSION C3-C6, SILVERCORD    CERVICAL FUSION N/A 5/21/2021    POSTERIOR C3-6 LAMINECTOMY, PARTIAL C7 LAMINECTOMY, FUSION C3-C6, SILVERCORD performed by Chloe Lambert DO at [address]    12 INCHES REMOVED D/T OBSTRUCTION    COLONOSCOPY      CYST REMOVAL Right     right facial    HYSTERECTOMY (CERVIX STATUS UNKNOWN)  1973    taken as a result of recurring cysts    LUMBAR FUSION N/A 02/10/2020    LUMBAR L4-5 POSTERIOR  DECOMPRESSION INSTRUMENTATION FUSION WCEMENT AUGMENTATION/ performed by José James MD at [facility] N/A 06/17/2020    L5-S1 PLIF L4-L5 REVISION performed by José James MD at [address] [facility]  08/14/2014    FESS    OVARY REMOVAL  1970    UNILATERAL due to cyst    OVARY REMOVAL  1971    partial, due to cyst    SINUS SURGERY  2004    UPPER GASTROINTESTINAL ENDOSCOPY N/A 05/31/2019    EGD ESOPHAGOGASTRODUODENOSCOPY performed by Narcisa Balderas MD at [address] [facility] N/A 08/05/2019    EGD BIOPSY performed by Alexi Finnegan MD at [address] [facility] N/A 08/23/2019    EGD BIOPSY performed by Narcisa Balderas MD at [address] [facility] 03/05/2019    WRIST OPEN REDUCTION INTERNAL FIXATION performed by Sandra Elias MD at [address]        Medications Prior to Admission:     Prior to Admission medications    Medication Sig Start Date End Date Taking? Authorizing Provider   busPIRone (BUSPAR) 5 MG tablet Take 1 tablet by mouth 3 times daily 7/5/22   Rishi Marinelli MD   pramipexole (MIRAPEX) 0.5 MG tablet Take 1 tablet by mouth nightly 7/5/22   Rishi Marinelli MD   traZODone (DESYREL) 50 MG tablet TAKE 1 TABLET BY MOUTH EVERY NIGHT AS NEEDED FOR SLEEP 6/29/22   Rishi Marinelli MD   furosemide (LASIX) 20 MG tablet Take 1 tablet by mouth daily 6/29/22   Rishi Marinelli MD   citalopram (CELEXA) 20 MG tablet Take 1 tablet by mouth daily 6/27/22   Rishi Marinelli MD   gabapentin (NEURONTIN) 100 MG capsule Take 2 capsules by mouth 2 times daily for 30 days.  6/8/22 7/8/22  Prudencio Garland MD   fenofibrate micronized (LOFIBRA) 134 MG capsule Take 1 capsule by mouth every morning (before breakfast) 5/23/22   Rishi Marinelli MD   apixaban (ELIQUIS) 5 MG TABS tablet Take 1 tablet by mouth 2 times daily 5/2/22   Rishi Marinelli MD   albuterol-ipratropium (COMBIVENT RESPIMAT)  MCG/ACT AERS inhaler Inhale 1 puff into the lungs every 6 hours as needed for Wheezing or Shortness of Breath 4/10/22   Luis Armando Borges MD   atorvastatin (LIPITOR) 40 MG tablet Take 1 tablet by mouth nightly 4/10/22   Luis Armando Borges MD   carvedilol (COREG) 12.5 MG tablet Take 1 tablet by mouth 2 times daily 4/10/22   Luis Armando Borges MD   amLODIPine (NORVASC) 5 MG tablet Take 1 tablet by mouth daily 4/10/22   Luis Armando Borges MD   acetaminophen (TYLENOL) 325 MG tablet Take 2 tablets by mouth every 4 hours as needed for Pain 4/7/22   Luis Armando Borges MD   melatonin 3 MG TABS tablet Take 2 tablets by mouth nightly as needed (insomnia) 4/7/22   Luis Armando Borges MD   nitroGLYCERIN (NITROSTAT) 0.4 MG SL tablet Place 1 tablet under the tongue every 5 minutes as needed for Chest pain 9/1/21   Lizz Bishop MD   cyanocobalamin ([employer] VITAMIN B12) 1000 MCG tablet Take 1 tablet by mouth daily  Patient taking differently: Take 1,000 mcg by mouth at bedtime  12/3/20   Lizz Bishop MD   ferrous sulfate (IRON 325) 325 (65 Fe) MG tablet Take 1 tablet by mouth 2 times daily 7/2/20   Jaclyn Lubin MD   VITAMIN D, ERGOCALCIFEROL, PO Take by mouth nightly   Patient not taking: Reported on 7/10/2022    Historical Provider, MD   Lancets MISC 1 each by Does not apply route daily  Patient not taking: Reported on 6/27/2022 10/10/19   Ifeoma Fowler MD   blood glucose monitor strips Test 2 times a day & as needed for symptoms of irregular blood glucose. Patient not taking: Reported on 6/27/2022 10/10/19   Gricelda Schwartz MD   Calcium Carbonate-Vitamin D (CALCIUM 500/D) 500-125 MG-UNIT TABS Take 1 tablet by mouth nightly     Historical Provider, MD        Allergies:     Bactrim [sulfamethoxazole-trimethoprim], Codeine, Diazepam, Meperidine hcl, and Seasonal    Social History:     Tobacco:    reports that she quit smoking about 5 years ago. Her smoking use included cigarettes. She started smoking about 26 years ago. She has a 10.00 pack-year smoking history.  She has never used smokeless tobacco.  Alcohol:      reports no history of alcohol use. Drug Use:  reports no history of drug use. Family History:     Family History   Problem Relation Age of Onset    Stroke Mother     Diabetes Mother     Heart Disease Mother     High Blood Pressure Mother     Heart Disease Father     Heart Disease Brother     High Blood Pressure Brother     Heart Disease Maternal Grandmother     High Blood Pressure Sister        Review of Systems:     Positive and Negative as described in HPI. CONSTITUTIONAL:  negative for fevers, chills, sweats, fatigue, weight loss  HEENT: Positive for bruising right side forehead negative for vision, hearing changes, runny nose, throat pain  RESPIRATORY: Positive for difficulty taking deep breaths due to pain and bruising, negative for shortness of breath, cough, congestion, wheezing. CARDIOVASCULAR:  negative for chest pain, palpitations.   GASTROINTESTINAL:  negative for nausea, vomiting, diarrhea, constipation, change in bowel habits, abdominal pain   GENITOURINARY:  negative for difficulty of urination, burning with urination, frequency   INTEGUMENT:  negative for rash, skin lesions, easy bruising   HEMATOLOGIC/LYMPHATIC:  negative for swelling/edema   ALLERGIC/IMMUNOLOGIC:  negative for urticaria , itching  ENDOCRINE:  negative increase in drinking, increase in urination, hot or cold intolerance  MUSCULOSKELETAL: Multiple bruising from recent fall, negative joint pains, muscle aches, swelling of joints  NEUROLOGICAL:  negative for headaches, dizziness, lightheadedness, numbness, pain, tingling extremities      Physical Exam:     BP (!) 142/52   Pulse 93   Temp 97.9 °F (36.6 °C)   Resp 16   Ht 5' 3\" (1.6 m)   Wt 192 lb 10.9 oz (87.4 kg)   SpO2 94%   BMI 34.13 kg/m²   Temp (24hrs), Av.8 °F (37.7 °C), Min:97.9 °F (36.6 °C), Max:102.8 °F (39.3 °C)    Recent Labs     22  1131   POCGLU 142*       Intake/Output Summary (Last 24 hours) at 7/12/2022 1207  Last data filed at 7/12/2022 0645  Gross per 24 hour   Intake 2431 ml   Output 600 ml   Net 1831 ml       General Appearance:  alert, well appearing, and in no acute distress  Head:  normocephalic, bruise right side forehead   eye: no icterus, redness, pupils equal and reactive, extraocular eye movements intact, conjunctiva clear  Ear: normal external ear, no discharge, hearing intact  Nose:  no drainage noted  Mouth: mucous membranes moist  Neck: supple, no carotid bruits, thyroid not palpable  Lungs: Tender and bruised over right side rib cage, finding it difficult to take deep breath bilateral equal air entry, no wheezing, rales or rhonchi, decreased effort due to pain   Cardiovascular: normal rate, regular rhythm, no murmur, gallop, rub.   Abdomen: Soft, nontender, nondistended, normal bowel sounds, no hepatomegaly or splenomegaly  Neurologic: There are no new focal motor or sensory deficits, normal muscle tone and bulk, no abnormal sensation, normal speech, cranial nerves II through XII grossly intact  Skin: No gross lesions, rashes, bruising or bleeding on exposed skin area  Extremities: Right leg redness extending up to mid thigh peripheral pulses palpable, grade 1 bilateral leg edema  Psych: normal affect    Investigations:      Laboratory Testing:  Recent Results (from the past 24 hour(s))   Urinalysis with Reflex to Culture    Collection Time: 07/11/22  6:13 PM    Specimen: Urine   Result Value Ref Range    Color, UA Yellow Yellow    Turbidity UA Clear Clear    Glucose, Ur NEGATIVE NEGATIVE    Bilirubin Urine NEGATIVE NEGATIVE    Ketones, Urine TRACE (A) NEGATIVE    Specific Gravity, UA 1.040 (H) 1.000 - 1.030    Urine Hgb SMALL (A) NEGATIVE    pH, UA 5.5 5.0 - 8.0    Protein, UA TRACE (A) NEGATIVE    Urobilinogen, Urine Normal Normal    Nitrite, Urine NEGATIVE NEGATIVE    Leukocyte Esterase, Urine NEGATIVE NEGATIVE   Microscopic Urinalysis    Collection Time: 07/11/22  6:13 PM   Result Value Ref Range    WBC, UA 0 TO 2 /HPF    RBC, UA 3 to 5 /HPF    Casts UA 3 to 5 /LPF    Epithelial Cells UA 0 TO 2 /HPF    Bacteria, UA None None   CBC with Auto Differential    Collection Time: 07/12/22  5:00 AM   Result Value Ref Range    WBC 11.7 (H) 3.5 - 11.0 k/uL    RBC 3.62 (L) 4.0 - 5.2 m/uL    Hemoglobin 11.2 (L) 12.0 - 16.0 g/dL    Hematocrit 34.9 (L) 36 - 46 %    MCV 96.5 80 - 100 fL    MCH 30.9 26 - 34 pg    MCHC 32.0 31 - 37 g/dL    RDW 13.9 11.5 - 14.9 %    Platelets 911 351 - 502 k/uL    MPV 7.9 6.0 - 12.0 fL    Seg Neutrophils 90 (H) 36 - 66 %    Lymphocytes 7 (L) 24 - 44 %    Monocytes 3 1 - 7 %    Eosinophils % 0 0 - 4 %    Basophils 0 0 - 2 %    Segs Absolute 10.50 (H) 1.3 - 9.1 k/uL    Absolute Lymph # 0.80 (L) 1.0 - 4.8 k/uL    Absolute Mono # 0.40 0.1 - 1.3 k/uL    Absolute Eos # 0.00 0.0 - 0.4 k/uL    Basophils Absolute 0.00 0.0 - 0.2 k/uL   Basic Metabolic Panel    Collection Time: 07/12/22  5:00 AM   Result Value Ref Range    Glucose 126 (H) 70 - 99 mg/dL    BUN 48 (H) 8 - 23 mg/dL    CREATININE 1.40 (H) 0.50 - 0.90 mg/dL    Calcium 8.1 (L) 8.6 - 10.4 mg/dL    Sodium 140 135 - 144 mmol/L    Potassium 3.6 (L) 3.7 - 5.3 mmol/L    Chloride 105 98 - 107 mmol/L    CO2 22 20 - 31 mmol/L    Anion Gap 13 9 - 17 mmol/L    GFR Non-African American 37 (L) >60 mL/min    GFR  45 (L) >60 mL/min    GFR Comment         POC Glucose Fingerstick    Collection Time: 07/12/22 11:31 AM   Result Value Ref Range    POC Glucose 142 (H) 65 - 105 mg/dL       Imaging/Diagonstics:  Recent data reviewed    Assessment :      Primary Problem  Fracture of body of sternum, initial encounter for open fracture    Active Hospital Problems    Diagnosis Date Noted    Erysipelas of right lower extremity [A46]      Priority: Medium    Closed fracture of body of sternum [S22.22XA]      Priority: Medium    Calculus of gallbladder without cholecystitis without obstruction [K80.20]      Priority: Medium    Leukocytosis [D72.829]      Priority: Medium    Type 2 diabetes mellitus with stage 3 chronic kidney disease (HCC) [E11.22, N18.30]      Priority: Medium    Allergy to sulfa drugs [Z88.2]      Priority: Medium    Fracture of body of sternum, initial encounter for open fracture [S22.22XB] 07/10/2022     Priority: Medium    Nondisplaced fracture of sternal end of left clavicle, initial encounter for closed fracture [S42.018A] 07/10/2022     Priority: Medium    Deep vein thrombosis (DVT) of right lower extremity ([address] [I82.401] 08/30/2017       Plan:       Multiple fractures- trauma surgery consulted  BIN creatinine 1.52, baseline 0.9 continue with gentle hydration  Chronic diastolic CHF-grade 1 mild diastolic dysfunction EF 85% on last echo March 2022-Coreg, amlodipine, Lasix currently on hold since blood pressure low normal  Patient is on Eliquis for history of recurrent DVT- currently on hold  Abdominal pain, gallstone present with distended gallbladder liver enzymes normal- HIDA scan  Today  Leg cellulitis-appears to be worsening- ID has been consulted    7/11  Patient seen after returning from ultrasound and HIDA scans- negative for acute cholecystitis- discussed with Dr. Reno Bergman surgical intervention /procedure planned  Recent fall without syncope/LOC-check UA  Will resume Eliquis-monitor H&H  Started on Rocephin for right leg cellulitis  Review creatinine tomorrow after hydration    DVT prophylaxis-Heparin    7/12/22    Intermittent fever   Has cellulitis rt leg   Lungs clear  Has some atelectasis .  Vitas stable   Cardiopulmonary status ok    Recent Labs     07/12/22  1131   POCGLU 142*     BP Readings from Last 3 Encounters:   07/12/22 (!) 142/52   06/30/22 138/65   06/14/22 136/69                 Consultations:   IP CONSULT TO GENERAL SURGERY  IP CONSULT TO INTERNAL MEDICINE  IP CONSULT TO INFECTIOUS DISEASES  IP CONSULT TO PHYSICAL MEDICINE Mary Lujan MD  7/12/2022  12:07 PM    Copy sent to Dr. Jessica Chan MD    Please note that this chart was generated using voice recognition Dragon dictation software. Although every effort was made to ensure the accuracy of this automated transcription, some errors in transcription may have occurred.

## 2022-07-12 NOTE — CONSULTS
Physical Medicine & Rehabilitation  Consult Note      Admitting Physician:  Susan Valle MD    Primary Care Provider:  Laurita Lieberman MD     Reason for Consult:  Acute Inpatient Rehabilitation    Chief Complaint:  Fall    History of Present Illness:  Referring Provider is requesting an evaluation for appropriate placement upon discharge from acute care. History from chart review and patient. Sanju Grissom is a 71 y.o. female with history of TIA, CAD, CHF, HTN, HLD, prediabetes, DVT, C diff, GERD, chronic back pain, and restless leg syndrome admitted to [facility] on 7/10/2022. She initially presented after a fall from standing at home a few days prior to presentation. CT head showed no acute intracranial abnormality. She was found to have a subacute sternal fracture, age-indeterminate right lateral 10th rib fracture, and multiple old left-sided rib fractures. She was also noted to have cholelithiasis but HIDA scan was negative for acute cholecystitis. ID was consulted for right lower limb erythema and is treating her with 7-day course of ceftriaxone for erisepylas. Bilateral lower limb venous duplex pending. She reports headache, chest/rib pain, shortness of breath, generalized weakness, and right lower limb redness. She denies any other acute concerns at this time. Review of Systems:  Review of Systems   Constitutional: Positive for fever. HENT: Negative for hearing loss. Eyes: Negative for blurred vision and double vision. Respiratory: Positive for shortness of breath. Cardiovascular: Positive for chest pain. Gastrointestinal: Negative for abdominal pain. No change in bladder control   Genitourinary:        No change in bladder control   Musculoskeletal: Positive for falls. Skin:        +right lower limb redness   Neurological: Positive for weakness and headaches. Negative for sensory change.       Premorbid function:  Independent    Current function:    PT OT                                              SLP:      Past Medical History:        Diagnosis Date    Allergic rhinitis, cause unspecified     Back pain     lumbar    Bowel obstruction (HCC)     history of due to scar tissue, resolved non-surgically    C. difficile diarrhea     CAD (coronary artery disease)     no stent needed per pt.  Dr. Davon Dueñas did cath at  2005    Cardiac murmur     Cellulitis     left leg    Cellulitis 2017 August    leg left leg/bug bite    Cerebral artery occlusion with cerebral infarction [facility])     TIA 2014    COVID-19     ONE YR AGO IN 4/25/2020 fever and cough    Diverticulosis of colon (without mention of hemorrhage)     GERD (gastroesophageal reflux disease)     GERD (gastroesophageal reflux disease)     on rx    History of blood transfusion     approx 2020        History of CHF (congestive heart failure)     History of MI (myocardial infarction) 2005    thought due to a blood clot    History of ovarian cyst 1970    had oopherectomy holly    History of peritonitis 1968    due to ruptured appendix age 13    HTN (hypertension)     Hx of blood clots     right leg    Hyperlipidemia     Intestinal or peritoneal adhesions with obstruction (postoperative) (postinfection) ([address]     Kidney infection     renal failure/sepsis/spider bite    Lateral epicondylitis  of elbow     MDRO (multiple drug resistant organisms) resistance     c diff    Muscle strain     right posterior shoulder    Other abnormal glucose     PONV (postoperative nausea and vomiting)     dry heaves    Pre-diabetes     Restless legs syndrome (RLS)     Snores     no cpap    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)     TIA (transient ischemic attack) 2014    Uses walker     Vitamin D deficiency     Wears glasses     Wellness examination     last seen 2 weeks ago       Past Surgical History:        Procedure Laterality Date    ABDOMEN SURGERY  1976    benign tumor removed near remaining ovary, 1.5 pounds    APPENDECTOMY  1968    appendix ruptured, developed peritonitis    BACK SURGERY      BUNIONECTOMY Left     along with calcium deposits removed   [address] CARDIAC CATHETERIZATION  2005    negative    CERVICAL FUSION  05/21/2021    POSTERIOR C3-6 LAMINECTOMY, PARTIAL C7 LAMINECTOMY, FUSION C3-C6, SILVERCORD    CERVICAL FUSION N/A 5/21/2021    POSTERIOR C3-6 LAMINECTOMY, PARTIAL C7 LAMINECTOMY, FUSION C3-C6, SILVERCORD performed by Kieran Hill DO at [address]    12 INCHES REMOVED D/T OBSTRUCTION    COLONOSCOPY      CYST REMOVAL Right     right facial    HYSTERECTOMY (CERVIX STATUS UNKNOWN)  1973    taken as a result of recurring cysts    LUMBAR FUSION N/A 02/10/2020    LUMBAR L4-5 POSTERIOR  DECOMPRESSION INSTRUMENTATION FUSION WCEMENT AUGMENTATION/ performed by Brandon Cabello MD at [place] [address] N/A 06/17/2020    L5-S1 PLIF L4-L5 REVISION performed by Brandon Cabello MD at [address] [place] [address]  08/14/2014    [address]    UNILATERAL due to cyst    OVARY REMOVAL  1971    partial, due to cyst   Brandyn Lobe SINUS SURGERY  2004    UPPER GASTROINTESTINAL ENDOSCOPY N/A 05/31/2019    EGD ESOPHAGOGASTRODUODENOSCOPY performed by Nehemisa Engel MD at [address] [facility] N/A 08/05/2019    EGD BIOPSY performed by Donna West MD at [address] [facility] N/A 08/23/2019    EGD BIOPSY performed by Nehemias Engel MD at [address] [facility] 03/05/2019    WRIST OPEN REDUCTION INTERNAL FIXATION performed by Ameena Avila MD at [address] [place] [address]:     Allergies   Allergen Reactions    Bactrim [Sulfamethoxazole-Trimethoprim] Other (See Comments)     confusion    Codeine Itching    Diazepam Other (See Comments)    Meperidine Hcl Other (See Comments)    Seasonal         Current Medications:   Current Facility-Administered Medications: sodium chloride flush 0.9 % injection 10 mL, 10 mL, IntraVENous, PRN  cefTRIAXone (ROCEPHIN) 1000 mg IVPB in 50 mL D5W minibag, 1,000 mg, IntraVENous, Q24H  apixaban (ELIQUIS) tablet 5 mg, 5 mg, Oral, BID  sodium chloride flush 0.9 % injection 10 mL, 10 mL, IntraVENous, PRN  sodium chloride flush 0.9 % injection 5-40 mL, 5-40 mL, IntraVENous, 2 times per day  sodium chloride flush 0.9 % injection 5-40 mL, 5-40 mL, IntraVENous, PRN  0.9 % sodium chloride infusion, , IntraVENous, PRN  ondansetron (ZOFRAN-ODT) disintegrating tablet 4 mg, 4 mg, Oral, Q8H PRN **OR** ondansetron (ZOFRAN) injection 4 mg, 4 mg, IntraVENous, Q6H PRN  polyethylene glycol (GLYCOLAX) packet 17 g, 17 g, Oral, Daily PRN  acetaminophen (TYLENOL) tablet 650 mg, 650 mg, Oral, Q6H PRN **OR** acetaminophen (TYLENOL) suppository 650 mg, 650 mg, Rectal, Q6H PRN  0.9 % sodium chloride infusion, , IntraVENous, Continuous  acetaminophen (TYLENOL) tablet 650 mg, 650 mg, Oral, Q4H PRN  fentaNYL (SUBLIMAZE) injection 25 mcg, 25 mcg, IntraVENous, Q2H PRN  fentaNYL (SUBLIMAZE) injection 50 mcg, 50 mcg, IntraVENous, Q2H PRN  oxyCODONE-acetaminophen (PERCOCET) 5-325 MG per tablet 1 tablet, 1 tablet, Oral, Q4H PRN  pantoprazole (PROTONIX) 40 mg in sodium chloride (PF) 10 mL injection, 40 mg, IntraVENous, Daily  atorvastatin (LIPITOR) tablet 40 mg, 40 mg, Oral, Nightly  busPIRone (BUSPAR) tablet 5 mg, 5 mg, Oral, TID  citalopram (CELEXA) tablet 20 mg, 20 mg, Oral, Daily  ferrous sulfate (IRON 325) tablet 325 mg, 325 mg, Oral, BID  melatonin tablet 6 mg, 6 mg, Oral, Nightly PRN  pramipexole (MIRAPEX) tablet 0.5 mg, 0.5 mg, Oral, Nightly  traZODone (DESYREL) tablet 50 mg, 50 mg, Oral, Nightly PRN  glucose chewable tablet 16 g, 4 tablet, Oral, PRN  dextrose bolus 10% 125 mL, 125 mL, IntraVENous, PRN **OR** dextrose bolus 10% 250 mL, 250 mL, IntraVENous, PRN  glucagon (rDNA) injection 1 mg, 1 mg, IntraMUSCular, PRN  dextrose 5 % solution, 100 mL/hr, IntraVENous, PRN  ipratropium-albuterol (DUONEB) nebulizer solution 1 ampule, 1 ampule, Inhalation, Q6H PRN    Family History:       Problem Relation Age of Onset    Stroke Mother     Diabetes Mother     Heart Disease Mother     High Blood Pressure Mother     Heart Disease Father     Heart Disease Brother     High Blood Pressure Brother     Heart Disease Maternal Grandmother     High Blood Pressure Sister        Social History:  Lives with:   spouse  Home setup:   Floors in home:  1. Bed/bathroom on floor:  main. Steps into home:  1. Social History     Socioeconomic History    Marital status:      Spouse name: None    Number of children: None    Years of education: None    Highest education level: None   Occupational History    Occupation: retired   Tobacco Use    Smoking status: Former Smoker     Packs/day: 0.50     Years: 20.00     Pack years: 10.00     Types: Cigarettes     Start date: 1995     Quit date: 2017     Years since quittin.0    Smokeless tobacco: Never Used   Vaping Use    Vaping Use: Never used   Substance and Sexual Activity    Alcohol use: No     Alcohol/week: 0.0 standard drinks    Drug use: No    Sexual activity: None   Other Topics Concern    None   Social History Narrative    None     Social Determinants of Health     Financial Resource Strain:     Difficulty of Paying Living Expenses: Not on file   Food Insecurity:     Worried About Running Out of Food in the Last Year: Not on file    Raghavendra of Food in the Last Year: Not on file   Transportation Needs:     Lack of Transportation (Medical): Not on file    Lack of Transportation (Non-Medical):  Not on file   Physical Activity:     Days of Exercise per Week: Not on file    Minutes of Exercise per Session: Not on file   Stress:     Feeling of Stress : Not on file   Social Connections:     Frequency of Communication with Friends and Family: Not on file    Frequency of Social Gatherings with Friends and Family: Not on file    Attends Restorationist Services: Not on file    Active Member of Clubs or Organizations: Not on file    Attends Club or Organization Meetings: Not on file    Marital Status: Not on file   Intimate Partner Violence:     Fear of Current or Ex-Partner: Not on file    Emotionally Abused: Not on file    Physically Abused: Not on file    Sexually Abused: Not on file   Housing Stability:     Unable to Pay for Housing in the Last Year: Not on file    Number of [place] in the Last Year: Not on file    Unstable Housing in the Last Year: Not on file       Physical Exam:  BP (!) 148/60   Pulse 95   Temp 99.1 °F (37.3 °C) (Oral)   Resp 18   Ht 5' 3\" (1.6 m)   Wt 192 lb 10.9 oz (87.4 kg)   SpO2 93%   BMI 34.13 kg/m²     GEN: Well developed, well nourished, no acute distress  HEENT: Normocephalic. Ecchymoses present at the right temple and on the right eyelid. EOM grossly intact. Mildly hard of hearing. Mucous membranes pink and moist.  RESP: Normal breath sounds with no wheezing, rales, or rhonchi. Respirations WNL and unlabored. CV: Regular rate and rhythm. No murmurs, rubs, or gallops. ABD: Soft, non-distended, BS+ and equal.  NEURO: Alert. Speech fluent. Sensation to light touch intact. MSK:  Muscle tone and bulk are normal bilaterally. Strength 2/5 with bilateral . Moving bilateral ankles with at least antigravity strength. LIMBS: Edema present in bilateral lower limbs. SKIN: Warm and dry with good turgor. Erythema of the distal right lower limb noted. PSYCH: Mood WNL. Affect WNL. Appropriately interactive.     Diagnostics:    CBC:   Recent Labs     07/10/22  1327 07/11/22  0455 07/12/22  0500   WBC 16.0* 14.6* 11.7*   RBC 3.95* 3.74* 3.62*   HGB 12.4 12.2 11.2*   HCT 36.9 34.6* 34.9*   MCV 93.5 92.5 96.5   RDW 13.8 13.8 13.9    225 205     BMP:   Recent Labs     07/10/22  1327 07/11/22  0455 07/12/22  0500    139 140   K 3.8 4.5 3.6*    103 105   CO2 26 22 22   BUN 44* 44* 48*   CREATININE 1.26* 1.52* 1.40*   GLUCOSE 118* 124* 126*      HbA1c:   Lab Results   Component Value Date    LABA1C 5.1 02/01/2022     BNP: No results for input(s): BNP in the last 72 hours. PT/INR:   Recent Labs     07/10/22  1327   PROTIME 17.3*   INR 1.4     APTT:   Recent Labs     07/10/22  1327   APTT 37.7*     CARDIAC ENZYMES: No results for input(s): CKMB, CKMBINDEX, TROPONINT in the last 72 hours. Invalid input(s): CKTOTAL;3  FASTING LIPID PANEL:  Lab Results   Component Value Date    CHOL 103 05/20/2019    HDL 74 03/12/2021    TRIG 66 05/20/2019     LIVER PROFILE:   Recent Labs     07/11/22  0455   AST 30   ALT 15   BILIDIR 0.17   BILITOT 0.60   ALKPHOS 36       Radiology:  NM HEPATOBILIARY SCAN W PHARMACOLOGICAL INTERVENTION   Final Result   The common bile duct and cystic duct are patent. No acute cholecystitis. US GALLBLADDER RUQ   Final Result   Cholelithiasis. No acute findings. RECOMMENDATIONS:   Unavailable         XR CHEST (2 VW)   Final Result   Left basilar atelectasis or infiltrate. Healing sternal fracture. XR HIP 2-3 VW W PELVIS LEFT   Final Result   No acute bony abnormality identified. CT CHEST ABDOMEN PELVIS W CONTRAST   Final Result   No acute traumatic abnormality identified in the chest, abdomen, or pelvis. Subacute mildly displaced proximal sternal body fracture. Age-indeterminate buckle fracture of the right lateral 10th rib. Multiple   old left-sided rib fractures. Distended gallbladder containing a stone at the gallbladder neck. Postsurgical changes in the lower lumbar spine with chronic appearing height   loss of L5.         CT CERVICAL SPINE WO CONTRAST   Final Result   1. Kyphosis of the cervical spine centered at C6-C7. 2. Mild multilevel degenerative changes in the cervical spine primarily at   C5-C6.    3. No acute vertebral body height loss in the cervical spine.   4. Evidence of posterior spinal fusion from C3-C6. RECOMMENDATIONS:   Unavailable         CT FACIAL BONES WO CONTRAST   Final Result   No acute facial bone trauma. Mild nonspecific inflammatory changes bilateral maxillary sinuses. CT HEAD WO CONTRAST   Final Result   No acute intracranial abnormality. VL Lower Extremity Bilateral Venous Duplex    (Results Pending)         Impression:    1. Debility secondary to sternal fracture, age-indeterminate right 10th rib fracture, and erisepylas  2. Anemia  3. BIN  4. Cholelithiasis without acute cholecystitis  5. Possible mild traumatic brain injury  6. CAD  7. CHF  8. HTN  9. HLD  10. Prediabetes  11. History of DVT  12. History of TIA  13. History of C diff  14. GERD  15. Chronic back pain  16. Restless leg syndrome  17. Obesity    Recommendations:    1. Diagnosis:  Debility secondary to sternal fracture, age-indeterminate right 10th rib fracture, and erisepylas; possible mild traumatic brain injury  2. Therapy: PT/OT evaluations not completed yet  3. Medical Necessity: As above  4. Support: Lives with spouse  5. Rehab Recommendation:  The patient will likely benefit from additional therapy after discharge. Will follow up therapy evaluations. 6. DVT Prophylaxis: On eliquis    It was my pleasure to evaluate Nela Tamara today. Please call with questions.     Connie Del Rio MD

## 2022-07-12 NOTE — PLAN OF CARE
Problem: Discharge Planning  Goal: Discharge to home or other facility with appropriate resources  7/12/2022 1604 by Amor Norman RN  Outcome: Progressing     Problem: Pain  Goal: Verbalizes/displays adequate comfort level or baseline comfort level  7/12/2022 1604 by Amor Norman RN  Outcome: Progressing     Problem: Safety - Adult  Goal: Free from fall injury  7/12/2022 1604 by Amor Norman RN  Outcome: Progressing     Problem: ABCDS Injury Assessment  Goal: Absence of physical injury  Outcome: Progressing

## 2022-07-12 NOTE — PROGRESS NOTES
Per Dr. Lorenzo Bidding providers will now take over as admitting. Registration notified and will update system.

## 2022-07-12 NOTE — CARE COORDINATION
Referral sent to [facility] rehab.      Electronically signed by HIPOLITO Domingo on 7/12/2022 at 11:59 AM

## 2022-07-12 NOTE — PROGRESS NOTES
Patient was seen and examined. No new changes from my standpoint. Afebrile vital signs are stable. Lungs decreased air entry at bases. Abdomen is soft. Right lower extremity cellulitis is improved. Small open wound. Blood work reviewed. Creatinine is improved to 1.4. BMP is unremarkable. WBC count is improved. Hemoglobin 11.2. Vascular surgery consult. Venous Doppler results pending. [facility] I spoke with her yesterday and she has accepted the patient. No acute general surgical issues at this time. Discussed with nursing staff.

## 2022-07-13 LAB
ABSOLUTE EOS #: 0.1 K/UL (ref 0–0.4)
ABSOLUTE LYMPH #: 1.1 K/UL (ref 1–4.8)
ABSOLUTE MONO #: 0.5 K/UL (ref 0.1–1.3)
ANION GAP SERPL CALCULATED.3IONS-SCNC: 8 MMOL/L (ref 9–17)
BASOPHILS # BLD: 0 % (ref 0–2)
BASOPHILS ABSOLUTE: 0 K/UL (ref 0–0.2)
BUN BLDV-MCNC: 28 MG/DL (ref 8–23)
CALCIUM SERPL-MCNC: 8.2 MG/DL (ref 8.6–10.4)
CHLORIDE BLD-SCNC: 109 MMOL/L (ref 98–107)
CO2: 24 MMOL/L (ref 20–31)
CREAT SERPL-MCNC: 0.79 MG/DL (ref 0.5–0.9)
EOSINOPHILS RELATIVE PERCENT: 1 % (ref 0–4)
GFR AFRICAN AMERICAN: >60 ML/MIN
GFR NON-AFRICAN AMERICAN: >60 ML/MIN
GFR SERPL CREATININE-BSD FRML MDRD: ABNORMAL ML/MIN/{1.73_M2}
GLUCOSE BLD-MCNC: 105 MG/DL (ref 65–105)
GLUCOSE BLD-MCNC: 106 MG/DL (ref 70–99)
GLUCOSE BLD-MCNC: 116 MG/DL (ref 65–105)
GLUCOSE BLD-MCNC: 154 MG/DL (ref 65–105)
HCT VFR BLD CALC: 32.5 % (ref 36–46)
HEMOGLOBIN: 11 G/DL (ref 12–16)
LYMPHOCYTES # BLD: 12 % (ref 24–44)
MCH RBC QN AUTO: 32 PG (ref 26–34)
MCHC RBC AUTO-ENTMCNC: 33.7 G/DL (ref 31–37)
MCV RBC AUTO: 95 FL (ref 80–100)
MONOCYTES # BLD: 6 % (ref 1–7)
PDW BLD-RTO: 14 % (ref 11.5–14.9)
PLATELET # BLD: 177 K/UL (ref 150–450)
PMV BLD AUTO: 7.8 FL (ref 6–12)
POTASSIUM SERPL-SCNC: 3.5 MMOL/L (ref 3.7–5.3)
RBC # BLD: 3.42 M/UL (ref 4–5.2)
REASON FOR REJECTION: NORMAL
SEG NEUTROPHILS: 81 % (ref 36–66)
SEGMENTED NEUTROPHILS ABSOLUTE COUNT: 7.6 K/UL (ref 1.3–9.1)
SODIUM BLD-SCNC: 141 MMOL/L (ref 135–144)
WBC # BLD: 9.5 K/UL (ref 3.5–11)
ZZ NTE CLEAN UP: ORDERED TEST: NORMAL
ZZ NTE WITH NAME CLEAN UP: SPECIMEN SOURCE: NORMAL

## 2022-07-13 PROCEDURE — 97530 THERAPEUTIC ACTIVITIES: CPT

## 2022-07-13 PROCEDURE — 6370000000 HC RX 637 (ALT 250 FOR IP)

## 2022-07-13 PROCEDURE — 97166 OT EVAL MOD COMPLEX 45 MIN: CPT

## 2022-07-13 PROCEDURE — 99233 SBSQ HOSP IP/OBS HIGH 50: CPT | Performed by: INTERNAL MEDICINE

## 2022-07-13 PROCEDURE — 6370000000 HC RX 637 (ALT 250 FOR IP): Performed by: NURSE PRACTITIONER

## 2022-07-13 PROCEDURE — 97535 SELF CARE MNGMENT TRAINING: CPT

## 2022-07-13 PROCEDURE — 6370000000 HC RX 637 (ALT 250 FOR IP): Performed by: INTERNAL MEDICINE

## 2022-07-13 PROCEDURE — 85025 COMPLETE CBC W/AUTO DIFF WBC: CPT

## 2022-07-13 PROCEDURE — 97116 GAIT TRAINING THERAPY: CPT

## 2022-07-13 PROCEDURE — G0378 HOSPITAL OBSERVATION PER HR: HCPCS

## 2022-07-13 PROCEDURE — 2580000003 HC RX 258: Performed by: SURGERY

## 2022-07-13 PROCEDURE — A4216 STERILE WATER/SALINE, 10 ML: HCPCS | Performed by: SURGERY

## 2022-07-13 PROCEDURE — 97162 PT EVAL MOD COMPLEX 30 MIN: CPT

## 2022-07-13 PROCEDURE — 99232 SBSQ HOSP IP/OBS MODERATE 35: CPT | Performed by: NURSE PRACTITIONER

## 2022-07-13 PROCEDURE — 6360000002 HC RX W HCPCS: Performed by: SURGERY

## 2022-07-13 PROCEDURE — 80048 BASIC METABOLIC PNL TOTAL CA: CPT

## 2022-07-13 PROCEDURE — 6370000000 HC RX 637 (ALT 250 FOR IP): Performed by: SURGERY

## 2022-07-13 PROCEDURE — 2580000003 HC RX 258: Performed by: NURSE PRACTITIONER

## 2022-07-13 PROCEDURE — 36415 COLL VENOUS BLD VENIPUNCTURE: CPT

## 2022-07-13 PROCEDURE — 96366 THER/PROPH/DIAG IV INF ADDON: CPT

## 2022-07-13 PROCEDURE — C9113 INJ PANTOPRAZOLE SODIUM, VIA: HCPCS | Performed by: SURGERY

## 2022-07-13 PROCEDURE — 6360000002 HC RX W HCPCS: Performed by: NURSE PRACTITIONER

## 2022-07-13 PROCEDURE — 05HD33Z INSERTION OF INFUSION DEVICE INTO RIGHT CEPHALIC VEIN, PERCUTANEOUS APPROACH: ICD-10-PCS | Performed by: SURGERY

## 2022-07-13 PROCEDURE — 96376 TX/PRO/DX INJ SAME DRUG ADON: CPT

## 2022-07-13 RX ORDER — POTASSIUM CHLORIDE 20 MEQ/1
40 TABLET, EXTENDED RELEASE ORAL ONCE
Status: COMPLETED | OUTPATIENT
Start: 2022-07-13 | End: 2022-07-13

## 2022-07-13 RX ADMIN — CITALOPRAM HYDROBROMIDE 20 MG: 20 TABLET ORAL at 08:09

## 2022-07-13 RX ADMIN — ATORVASTATIN CALCIUM 40 MG: 40 TABLET, FILM COATED ORAL at 21:50

## 2022-07-13 RX ADMIN — OXYCODONE HYDROCHLORIDE AND ACETAMINOPHEN 1 TABLET: 5; 325 TABLET ORAL at 12:04

## 2022-07-13 RX ADMIN — PRAMIPEXOLE DIHYDROCHLORIDE 0.5 MG: 0.25 TABLET ORAL at 23:48

## 2022-07-13 RX ADMIN — SODIUM CHLORIDE, PRESERVATIVE FREE 10 ML: 5 INJECTION INTRAVENOUS at 21:55

## 2022-07-13 RX ADMIN — OXYCODONE HYDROCHLORIDE AND ACETAMINOPHEN 1 TABLET: 5; 325 TABLET ORAL at 21:50

## 2022-07-13 RX ADMIN — PANTOPRAZOLE SODIUM 40 MG: 40 INJECTION, POWDER, FOR SOLUTION INTRAVENOUS at 08:09

## 2022-07-13 RX ADMIN — BUSPIRONE HYDROCHLORIDE 5 MG: 5 TABLET ORAL at 12:04

## 2022-07-13 RX ADMIN — TRAZODONE HYDROCHLORIDE 50 MG: 50 TABLET ORAL at 21:50

## 2022-07-13 RX ADMIN — APIXABAN 5 MG: 5 TABLET, FILM COATED ORAL at 21:50

## 2022-07-13 RX ADMIN — BUSPIRONE HYDROCHLORIDE 5 MG: 5 TABLET ORAL at 21:50

## 2022-07-13 RX ADMIN — FENTANYL CITRATE 50 MCG: 50 INJECTION, SOLUTION INTRAMUSCULAR; INTRAVENOUS at 08:09

## 2022-07-13 RX ADMIN — OXYCODONE HYDROCHLORIDE AND ACETAMINOPHEN 1 TABLET: 5; 325 TABLET ORAL at 17:49

## 2022-07-13 RX ADMIN — FENTANYL CITRATE 50 MCG: 50 INJECTION, SOLUTION INTRAMUSCULAR; INTRAVENOUS at 01:41

## 2022-07-13 RX ADMIN — FENTANYL CITRATE 50 MCG: 50 INJECTION, SOLUTION INTRAMUSCULAR; INTRAVENOUS at 03:38

## 2022-07-13 RX ADMIN — BUSPIRONE HYDROCHLORIDE 5 MG: 5 TABLET ORAL at 08:09

## 2022-07-13 RX ADMIN — FERROUS SULFATE TAB 325 MG (65 MG ELEMENTAL FE) 325 MG: 325 (65 FE) TAB at 08:09

## 2022-07-13 RX ADMIN — FENTANYL CITRATE 50 MCG: 50 INJECTION, SOLUTION INTRAMUSCULAR; INTRAVENOUS at 06:14

## 2022-07-13 RX ADMIN — CEFTRIAXONE SODIUM 1000 MG: 1 INJECTION, POWDER, FOR SOLUTION INTRAMUSCULAR; INTRAVENOUS at 14:14

## 2022-07-13 RX ADMIN — APIXABAN 5 MG: 5 TABLET, FILM COATED ORAL at 08:09

## 2022-07-13 RX ADMIN — FERROUS SULFATE TAB 325 MG (65 MG ELEMENTAL FE) 325 MG: 325 (65 FE) TAB at 21:55

## 2022-07-13 RX ADMIN — POTASSIUM CHLORIDE 40 MEQ: 1500 TABLET, EXTENDED RELEASE ORAL at 12:04

## 2022-07-13 RX ADMIN — SODIUM CHLORIDE: 9 INJECTION, SOLUTION INTRAVENOUS at 06:13

## 2022-07-13 ASSESSMENT — PAIN DESCRIPTION - LOCATION
LOCATION: RIB CAGE
LOCATION: OTHER (COMMENT)
LOCATION: ABDOMEN
LOCATION: LEG
LOCATION: ABDOMEN

## 2022-07-13 ASSESSMENT — PAIN DESCRIPTION - DESCRIPTORS
DESCRIPTORS: ACHING;DISCOMFORT
DESCRIPTORS: THROBBING

## 2022-07-13 ASSESSMENT — PAIN - FUNCTIONAL ASSESSMENT
PAIN_FUNCTIONAL_ASSESSMENT: PREVENTS OR INTERFERES WITH MANY ACTIVE NOT PASSIVE ACTIVITIES
PAIN_FUNCTIONAL_ASSESSMENT: PREVENTS OR INTERFERES SOME ACTIVE ACTIVITIES AND ADLS

## 2022-07-13 ASSESSMENT — PAIN DESCRIPTION - ORIENTATION
ORIENTATION: RIGHT
ORIENTATION: RIGHT

## 2022-07-13 ASSESSMENT — PAIN SCALES - GENERAL
PAINLEVEL_OUTOF10: 8
PAINLEVEL_OUTOF10: 10
PAINLEVEL_OUTOF10: 10
PAINLEVEL_OUTOF10: 4
PAINLEVEL_OUTOF10: 10
PAINLEVEL_OUTOF10: 10
PAINLEVEL_OUTOF10: 8
PAINLEVEL_OUTOF10: 7
PAINLEVEL_OUTOF10: 8

## 2022-07-13 ASSESSMENT — PAIN DESCRIPTION - PAIN TYPE: TYPE: ACUTE PAIN

## 2022-07-13 ASSESSMENT — PAIN DESCRIPTION - FREQUENCY: FREQUENCY: CONTINUOUS

## 2022-07-13 NOTE — PROGRESS NOTES
Dr. Dyan Addison notified of consult and venous doppler results:     Summary        Bilateral:    Technically limited study as stated above however in the visualized areas    no superficial or deep vein thrombosis was identified.         Findings:        Right Impression:                    Left Impression:    Non visualization of the peroneal    Non visualization of the peroneal    veins.                               veins.        Remaining deep veins                 Remaining deep veins    demonstrate normal compressibility.  demonstrate normal compressibility.        Normal compressibility of the great  Normal compressibility of the great    saphenous vein.                      saphenous vein.        Normal compressibility of the small  Normal compressibility of the small    saphenous vein.                      saphenous vein. No new orders.

## 2022-07-13 NOTE — PLAN OF CARE
Problem: Discharge Planning  Goal: Discharge to home or other facility with appropriate resources  7/13/2022 1720 by Yashira Batista RN  Outcome: Progressing  Flowsheets  Taken 7/13/2022 1700  Discharge to home or other facility with appropriate resources:   Identify barriers to discharge with patient and caregiver   Identify discharge learning needs (meds, wound care, etc)  Taken 7/13/2022 1655  Discharge to home or other facility with appropriate resources:   Identify barriers to discharge with patient and caregiver   Identify discharge learning needs (meds, wound care, etc)  7/13/2022 1446 by Lucinda Pacheco RN  Outcome: Progressing  7/13/2022 0531 by Radha Miranda RN  Outcome: Progressing     Problem: Pain  Goal: Verbalizes/displays adequate comfort level or baseline comfort level  7/13/2022 1720 by Yashira Batista RN  Outcome: Progressing  7/13/2022 1446 by Lucinda Pacheco RN  Outcome: Progressing  7/13/2022 0531 by Radha Miranda RN  Outcome: Progressing  Flowsheets (Taken 7/13/2022 0531)  Verbalizes/displays adequate comfort level or baseline comfort level:   Encourage patient to monitor pain and request assistance   Assess pain using appropriate pain scale   Administer analgesics based on type and severity of pain and evaluate response  Note: Pt medicated with pain medication prn. Assessed all pain characteristics including level, type, location, frequency, and onset. Non-pharmacologic interventions offered to pt as well. Pt states pain is tolerable at this time. Will continue to monitor.       Problem: Safety - Adult  Goal: Free from fall injury  7/13/2022 1720 by Yashira Batista RN  Outcome: Progressing  Flowsheets (Taken 7/13/2022 1713)  Free From Fall Injury: Instruct family/caregiver on patient safety  7/13/2022 1446 by Lucinda Pacheco RN  Outcome: Progressing  7/13/2022 0531 by Radha Miranda RN  Outcome: Progressing     Problem: ABCDS Injury Assessment  Goal: Absence of physical injury  7/13/2022 1720 by Daija Orozco RN  Outcome: Progressing  Flowsheets (Taken 7/13/2022 1713)  Absence of Physical Injury: Implement safety measures based on patient assessment  7/13/2022 1446 by Sandrine Jensen RN  Outcome: Progressing     Problem: Chronic Conditions and Co-morbidities  Goal: Patient's chronic conditions and co-morbidity symptoms are monitored and maintained or improved  7/13/2022 1720 by Daija Orozco RN  Outcome: Progressing  Flowsheets (Taken 7/13/2022 1655)  Care Plan - Patient's Chronic Conditions and Co-Morbidity Symptoms are Monitored and Maintained or Improved: Monitor and assess patient's chronic conditions and comorbid symptoms for stability, deterioration, or improvement  7/13/2022 1446 by Sandrine Jensen RN  Outcome: Progressing

## 2022-07-13 NOTE — PROGRESS NOTES
Report called and given to [facility] on med surg. Patients midline was placed.

## 2022-07-13 NOTE — PROGRESS NOTES
[facility] - Initial Consult Note  Today's Date and Time: 7/13/2022, 5:03 PM    Impression :   · RLE Erisepylas  · Cholelithiasis. · Leukocytosis  · Elevated CRP  · Recent fall. · DM  · CKD III  · Allergy to Sulfa-Confusion. Recommendations:   · Ceftriaxone 1 gm IV daily x 14 days until 7/25/22. · Follow CBC and renal function closely. · Follow BC. · Supportive care. Medical Decision Making/Summary/Discussion:7/13/2022     · Pen G is ideal therapy. Not a good choice due to Na levels, pt. Decreased renal function,hx of CHF. · Small break in the skin noted to the right lateral calf. Scant amount of serous drainage. · Check blood cultures. Infection Control Recommendations   · [place] Precautions    Antimicrobial Stewardship Recommendations     · Simplification of therapy  · Targeted therapy    Coordination of Outpatient Care:   · Estimated Length of IV antimicrobials:14 days until 7/25/22  · Patient will need Midline Catheter Insertion: Midline placed 7/13/22  · Patient will need PICC line Insertion:BD  · Patient will need: Home IV , [place],  SNF,  LTAC: TBD  · Patient will need outpatient wound care:    Chief complaint/reason for consultation:   · Possible RLE Cellulitis      History of Present Illness:   Lyn Hinton is a 71y.o.-year-old female who was initially admitted on 7/10/2022. Patient seen at the request of     INITIAL HISTORY:  23-year-old female who presented to the ED s/p fall 2 days ago. Patient states she trying to open a box of food and she slipped and fell. She hit her right side on something, she is not sure what. Angel loss of consciousness. Associated symptoms include left hip pain, pain to her right rib cage and right upper abdomen. No associated symptoms of headache, neck or back pain. She is on blood thinners. CT results reviewed as below. Recent hospitalization 6/5-6/14 for BLE cellulitis.   Treated with IV Vancomycin, discharged on Doxycycline x 7 days. Patient presents with BLE redness and swelling. Upon examination patient has erysepilas to the RLE, mid-forefoot to mid-thigh. Light pink in color. No open wounds or areas of skin breaks. Patient states she has had this for about 2-weeks. Denies any pain to the RLE. CURRENT EVALUATION 7/13/2022  BP (!) 143/63   Pulse 80   Temp 98.2 °F (36.8 °C) (Oral)   Resp (!) 2   Ht 5' 3\" (1.6 m)   Wt 192 lb 10.9 oz (87.4 kg)   SpO2 93%   BMI 34.13 kg/m²   Feeling much better today. Denies any fever, chills, n/v/d. C/o pain to broken rib. Small open area to right lateral calf. Scant amount of serous drainage. RLE remains pink, warm, swollen. Slowly improving. WBC normalized. Midline placed today. Labs, X rays reviewed: 7/13/2022    BUN:44>44>48  Cr:1.26>1.25>1.52>1.40>0.79    WBC:16.0>14.6>11.7>9.5  Hb:  Plat: 402>989>322    CRP:166.1    Cultures:  Urine:  · 7/11 UA-Negative. Blood:  ·   Sputum :  ·   Wound:     MRSA Nares:       Imaging:  XR HIP 2-3 VW W PELVIS LEFT   Preliminary Result   No acute bony abnormality identified. CT CHEST ABDOMEN PELVIS W CONTRAST   Preliminary Result   No acute traumatic abnormality identified in the chest, abdomen, or pelvis. Subacute mildly displaced proximal sternal body fracture. Age-indeterminate buckle fracture of the right lateral 10th rib. Multiple   old left-sided rib fractures. Distended gallbladder containing a stone at the gallbladder neck. Postsurgical changes in the lower lumbar spine with chronic appearing height   loss of L5.           CT CERVICAL SPINE WO CONTRAST   Final Result   1. Kyphosis of the cervical spine centered at C6-C7. 2. Mild multilevel degenerative changes in the cervical spine primarily at   C5-C6. 3. No acute vertebral body height loss in the cervical spine. 4. Evidence of posterior spinal fusion from C3-C6.        RECOMMENDATIONS:   Unavailable CT FACIAL BONES WO CONTRAST   Final Result   No acute facial bone trauma. CT HEAD WO CONTRAST   Preliminary Result   No acute intracranial abnormality. 7/11 RUQ US  FINDINGS:   LIVER:  The liver demonstrates normal echogenicity without evidence of   intrahepatic biliary ductal dilatation. There is a paddle pedal flow in the   portal vein. Liver 17 cm in length. BILIARY SYSTEM:  Gallstones in the gallbladder. No wall thickening or   pericholecystic fluid. Common bile duct is within normal limits measuring 5.2 mm. RIGHT KIDNEY: The right kidney is grossly unremarkable without evidence of   hydronephrosis. PANCREAS:  Visualized portions of the pancreas are unremarkable. OTHER: No evidence of right upper quadrant ascites. Impression   Cholelithiasis. No acute findings. RECOMMENDATIONS:   Unavailable                   Discussed with patient, RN. I have personally reviewed the past medical history, past surgical history, medications, social history, and family history, and I have updated the database accordingly. Past Medical History:     Past Medical History:   Diagnosis Date    Allergic rhinitis, cause unspecified     Back pain     lumbar    Bowel obstruction (HCC)     history of due to scar tissue, resolved non-surgically    C. difficile diarrhea     CAD (coronary artery disease)     no stent needed per pt.  Dr. Zara Brizuela did cath at  2005    Cardiac murmur     Cellulitis     left leg    Cellulitis 2017 August    leg left leg/bug bite    Cerebral artery occlusion with cerebral infarction [facility])     TIA 2014    COVID-19     ONE YR AGO IN 4/25/2020 fever and cough    Diverticulosis of colon (without mention of hemorrhage)     GERD (gastroesophageal reflux disease)     GERD (gastroesophageal reflux disease)     on rx    History of blood transfusion     approx 2020        History of CHF (congestive heart failure)     History of MI (myocardial infarction) 2005    thought due to a blood clot    History of ovarian cyst 1970    had oopherectomy holly    History of peritonitis 1968    due to ruptured appendix age 13    HTN (hypertension)     Hx of blood clots     right leg    Hyperlipidemia     Intestinal or peritoneal adhesions with obstruction (postoperative) (postinfection) ([address]     Kidney infection     renal failure/sepsis/spider bite    Lateral epicondylitis  of elbow     MDRO (multiple drug resistant organisms) resistance     c diff    Muscle strain     right posterior shoulder    Other abnormal glucose     PONV (postoperative nausea and vomiting)     dry heaves    Pre-diabetes     Restless legs syndrome (RLS)     Snores     no cpap    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)     TIA (transient ischemic attack) 2014    Uses walker     Vitamin D deficiency     Wears glasses     Wellness examination     last seen 2 weeks ago       Past Surgical  History:     Past Surgical History:   Procedure Laterality Date    ABDOMEN SURGERY  1976    benign tumor removed near remaining ovary, 1.5 pounds    APPENDECTOMY  1968    appendix ruptured, developed peritonitis    BACK SURGERY      BUNIONECTOMY Left     along with calcium deposits removed   [address]  2005    negative    CERVICAL FUSION  05/21/2021    POSTERIOR C3-6 LAMINECTOMY, PARTIAL C7 LAMINECTOMY, FUSION C3-C6, SILVERCORD    CERVICAL FUSION N/A 5/21/2021    POSTERIOR C3-6 LAMINECTOMY, PARTIAL C7 LAMINECTOMY, FUSION C3-C6, SILVERCORD performed by Brooklyn Shanks DO at [address]    12 INCHES REMOVED D/T OBSTRUCTION    COLONOSCOPY      CYST REMOVAL Right     right facial    HYSTERECTOMY ([address])  1973    taken as a result of recurring cysts    LUMBAR FUSION N/A 02/10/2020    LUMBAR L4-5 POSTERIOR  DECOMPRESSION INSTRUMENTATION FUSION WCEMENT AUGMENTATION/ performed by Georgi Dance, MD at [facility] [address] N/A 2020    L5-S1 PLIF L4-L5 REVISION performed by Kelsie Wolf MD at [address]  2014    FESS   [address]    UNILATERAL due to cyst    OVARY REMOVAL      partial, due to cyst    SINUS SURGERY      UPPER GASTROINTESTINAL ENDOSCOPY N/A 2019    EGD ESOPHAGOGASTRODUODENOSCOPY performed by Brian Santacruz MD at [address] [place] [address] N/A 2019    EGD BIOPSY performed by Chani Moralez MD at [address] N/A 2019    EGD BIOPSY performed by Brian Santacruz MD at [address] 2019    WRIST OPEN REDUCTION INTERNAL FIXATION performed by Rossy Barth MD at [address]       Medications:      cefTRIAXone (ROCEPHIN) IV  1,000 mg IntraVENous Q24H    apixaban  5 mg Oral BID    sodium chloride flush  5-40 mL IntraVENous 2 times per day    pantoprazole (PROTONIX) 40 mg injection  40 mg IntraVENous Daily    atorvastatin  40 mg Oral Nightly    busPIRone  5 mg Oral TID    citalopram  20 mg Oral Daily    ferrous sulfate  325 mg Oral BID    pramipexole  0.5 mg Oral Nightly       Social History:     Social History     Socioeconomic History    Marital status:      Spouse name: Not on file    Number of children: Not on file    Years of education: Not on file    Highest education level: Not on file   Occupational History    Occupation: retired   Tobacco Use    Smoking status: Former Smoker     Packs/day: 0.50     Years: 20.00     Pack years: 10.00     Types: Cigarettes     Start date: 1995     Quit date: 2017     Years since quittin.0    Smokeless tobacco: Never Used   Vaping Use    Vaping Use: Never used   Substance and Sexual Activity    Alcohol use: No     Alcohol/week: 0.0 standard drinks    Drug use: No    Sexual activity: Not on file   Other Topics Concern    Not on file   Social History Narrative    Not on file     Social Determinants of Health     Financial Resource Strain:     Difficulty of Paying Living Expenses: Not on file   Food Insecurity:     Worried About Running Out of Food in the Last Year: Not on file    Raghavendra of Food in the Last Year: Not on file   Transportation Needs:     Lack of Transportation (Medical): Not on file    Lack of Transportation (Non-Medical): Not on file   Physical Activity:     Days of Exercise per Week: Not on file    Minutes of Exercise per Session: Not on file   Stress:     Feeling of Stress : Not on file   Social Connections:     Frequency of Communication with Friends and Family: Not on file    Frequency of Social Gatherings with Friends and Family: Not on file    Attends Faith Services: Not on file    Active Member of [address] [place] [address] [employer] or Organizations: Not on file    Attends Club or Organization Meetings: Not on file    Marital Status: Not on file   Intimate Partner Violence:     Fear of Current or Ex-Partner: Not on file    Emotionally Abused: Not on file    Physically Abused: Not on file    Sexually Abused: Not on file   Housing Stability:     Unable to Pay for Housing in the Last Year: Not on file    Number of [place] in the Last Year: Not on file    Unstable Housing in the Last Year: Not on file       Family History:     Family History   Problem Relation Age of Onset    Stroke Mother     Diabetes Mother     Heart Disease Mother     High Blood Pressure Mother     Heart Disease Father     Heart Disease Brother     High Blood Pressure Brother     Heart Disease Maternal Grandmother     High Blood Pressure Sister         Allergies:   Bactrim [sulfamethoxazole-trimethoprim], Codeine, Diazepam, Meperidine hcl, and Seasonal     Review of Systems:   Constitutional: No fevers or chills. No systemic complaints  Head: No headaches  Eyes: No double vision or blurry vision. No conjunctival inflammation.   R orbit echymotic. ENT: No sore throat or runny nose. Cardiovascular: No chest pain or palpitations. No shortness of breath. No [place]  Lung: No shortness of breath or cough. No sputum production  Abdomen: No nausea, vomiting, diarrhea, or abdominal pain. Lawernce Roughen No cramps. Genitourinary: No increased urinary frequency, or dysuria. No hematuria. No suprapubic or CVA pain  Musculoskeletal:c/o pain to broken rib. Hematologic: No bleeding or bruising. Neurologic: No headache, weakness, numbness, or tingling. Integument: No rash, no ulcers. 2-week hx of redness to RLE. Psychiatric: No depression. Endocrine: No polyuria, no polydipsia, no polyphagia. Physical Examination :     Patient Vitals for the past 8 hrs:   BP Temp Temp src Pulse Resp SpO2   07/13/22 1638 (!) 143/63 98.2 °F (36.8 °C) Oral 80 (!) 2 93 %   07/13/22 1234 -- -- -- -- 19 --   07/13/22 1204 (!) 113/91 98.3 °F (36.8 °C) Oral 80 18 97 %   07/13/22 1145 (!) 142/57 -- -- -- -- --     General Appearance: Awake, alert, and in no apparent distress  Head:  Normocephalic, right orbit ecchymotic from fall. Eyes: Pupils equal, round, reactive to light and accommodation; extraocular movements intact; sclera anicteric; conjunctivae pink. ENT: Oropharynx clear, without erythema, exudate, or thrush. No tenderness of sinuses. Mouth/throat: mucosa pink and moist.   Neck:Supple, without lymphadenopathy. No JVD, tracheal deviation. Pulmonary/Chest: Clear to auscultation, without wheezes, rales, or rhonchi. Cardiovascular: Regular rate and rhythm without murmurs, rubs, or gallops. Abdomen: Soft, non tender. Bowel sounds normal.   All four Extremities: No cyanosis, clubbing. 2+edema to RLE. Neurologic: No gross sensory or motor deficits. Skin: Warm and dry with good turgor. RLE pink, warm from mid-forefoot to mid-thigh. Small skin break to right lateral calf. No induration.     Medical Decision Making -Laboratory:   I have independently reviewed/ordered the following labs:    CBC with Differential:   Recent Labs     07/12/22  0500 07/13/22  0535   WBC 11.7* 9.5   HGB 11.2* 11.0*   HCT 34.9* 32.5*    177   LYMPHOPCT 7* 12*   MONOPCT 3 6     BMP:   Recent Labs     07/12/22  0500 07/13/22  0707    141   K 3.6* 3.5*    109*   CO2 22 24   BUN 48* 28*   CREATININE 1.40* 0.79     Hepatic Function Panel:   Recent Labs     07/11/22  0455   PROT 6.9   LABALBU 3.7   BILIDIR 0.17   IBILI 0.43   BILITOT 0.60   ALKPHOS 36   ALT 15   AST 30     No results for input(s): RPR in the last 72 hours. No results for input(s): HIV in the last 72 hours. No results for input(s): BC in the last 72 hours. Lab Results   Component Value Date/Time    MUCUS NOT REPORTED 08/21/2019 10:00 PM    RBC 3.42 07/13/2022 05:35 AM    TRICHOMONAS NOT REPORTED 08/21/2019 10:00 PM    WBC 9.5 07/13/2022 05:35 AM    YEAST NOT REPORTED 08/21/2019 10:00 PM    TURBIDITY Clear 07/11/2022 06:13 PM     Lab Results   Component Value Date/Time    CREATININE 0.79 07/13/2022 07:07 AM    GLUCOSE 106 07/13/2022 07:07 AM       Medical Decision Making-Imaging:       Medical Decision [id number]:       Medical Decision Making-Other:     Note:  · Labs, medications, radiologic studies were reviewed with personal review of films  · Large amounts of data were reviewed  · Discussed with nursing Staff, Discharge planner  · Infection Control and Prevention measures reviewed  · All prior entries were reviewed  · Administer medications as ordered  · Prognosis: Good  · Discharge planning reviewed  · Follow up as outpatient. Thank you for allowing us to participate in the care of this patient. Please call with questions.     MAIK Jain - CNP    Perfect Serve/Office: [phone]

## 2022-07-13 NOTE — PROGRESS NOTES
[facility] Internal [facility]    Progress Note  Chart Reviewed: Yes  Patient assessed for rehabilitation services?: Yes  Family / Caregiver Present: No  Referring Practitioner: KYLEIGH Lantigua  Referral Date : 07/11/22  Diagnosis: Fracture of sternum body 2* to mechanical fall  7/13/2022    12:18 PM    Name:   Duke Canavan  MRN:     [id number]     Acct:      [de-identified]   Room:   [address] [place] [address] Day:  0  Admit Date:  7/10/2022 12:48 PM    PCP:   Lisa Pereyra MD  Code Status:  Full Code    Subjective:     C/C:   Chief Complaint   Patient presents with   [place]    Head Injury    Shortness of Breath    Hip Pain     left     Principal Problem:    Fracture of body of sternum, initial encounter for open fracture  Active Problems:    Nondisplaced fracture of sternal end of left clavicle, initial encounter for closed fracture    Erysipelas of right lower extremity    Closed fracture of body of sternum    Calculus of gallbladder without cholecystitis without obstruction    Leukocytosis    Type 2 diabetes mellitus with stage 3 chronic kidney disease ([address]    Allergy to sulfa drugs    Deep vein thrombosis (DVT) of right lower extremity ([address]  Resolved Problems:    * No resolved hospital problems. *      Patient reports feeling better  Was working with physical therapy this morning     Has been afebrile  HR stable  Blood pressure has been labile  On room air    Labs -   K 3.5, replaced  Cr 0.79  Wbc 9.5  Hb 11  Platelets 453    Blood cultures negative so far    On NS  On rocephin for RLE cellulitis     pending PT/O recommendations and PM & R receommendations     Recent Results (from the past 24 hour(s))   POC Glucose Fingerstick    Collection Time: 07/12/22  4:39 PM   Result Value Ref Range    POC Glucose 116 (H) 65 - 105 mg/dL   Culture, Blood 1    Collection Time: 07/12/22  7:30 PM    Specimen: Blood   Result Value Ref Range    Specimen Description . BLOOD LEFT HAND, GRN 1ML, ORN 1ML Culture NO GROWTH 12 HOURS    Culture, Blood 1    Collection Time: 07/12/22  7:40 PM    Specimen: Blood   Result Value Ref Range    Specimen Description . BLOOD RIGHT HAND, GRN 1ML, ORN 1ML     Culture NO GROWTH 12 HOURS    CBC with Auto Differential    Collection Time: 07/13/22  5:35 AM   Result Value Ref Range    WBC 9.5 3.5 - 11.0 k/uL    RBC 3.42 (L) 4.0 - 5.2 m/uL    Hemoglobin 11.0 (L) 12.0 - 16.0 g/dL    Hematocrit 32.5 (L) 36 - 46 %    MCV 95.0 80 - 100 fL    MCH 32.0 26 - 34 pg    MCHC 33.7 31 - 37 g/dL    RDW 14.0 11.5 - 14.9 %    Platelets 390 419 - 125 k/uL    MPV 7.8 6.0 - 12.0 fL    Seg Neutrophils 81 (H) 36 - 66 %    Lymphocytes 12 (L) 24 - 44 %    Monocytes 6 1 - 7 %    Eosinophils % 1 0 - 4 %    Basophils 0 0 - 2 %    Segs Absolute 7.60 1.3 - 9.1 k/uL    Absolute Lymph # 1.10 1.0 - 4.8 k/uL    Absolute Mono # 0.50 0.1 - 1.3 k/uL    Absolute Eos # 0.10 0.0 - 0.4 k/uL    Basophils Absolute 0.00 0.0 - 0.2 k/uL   SPECIMEN REJECTION    Collection Time: 07/13/22  5:35 AM   Result Value Ref Range    Specimen Source BLOOD     Ordered Test BMP     Reason for Rejection Unable to perform testing: Specimen hemolyzed.     Basic Metabolic Panel    Collection Time: 07/13/22  7:07 AM   Result Value Ref Range    Glucose 106 (H) 70 - 99 mg/dL    BUN 28 (H) 8 - 23 mg/dL    CREATININE 0.79 0.50 - 0.90 mg/dL    Calcium 8.2 (L) 8.6 - 10.4 mg/dL    Sodium 141 135 - 144 mmol/L    Potassium 3.5 (L) 3.7 - 5.3 mmol/L    Chloride 109 (H) 98 - 107 mmol/L    CO2 24 20 - 31 mmol/L    Anion Gap 8 (L) 9 - 17 mmol/L    GFR Non-African American >60 >60 mL/min    GFR African American >60 >60 mL/min    GFR Comment         POC Glucose Fingerstick    Collection Time: 07/13/22  7:19 AM   Result Value Ref Range    POC Glucose 105 65 - 105 mg/dL   POC Glucose Fingerstick    Collection Time: 07/13/22 11:14 AM   Result Value Ref Range    POC Glucose 154 (H) 65 - 105 mg/dL     Recent Labs     07/12/22  1131 07/12/22  1639 07/13/22  0719 07/13/22  1114   POCGLU 142* 116* 105 154*        VL Lower Extremity Bilateral Venous Duplex    Result Date: 7/13/2022    [facility]  Vascular Lower Extremities DVT Study Procedure   Patient Name      Nba Jackson Date of Study             07/12/2022                    RIA   Date of Birth     1950   Gender                    Female   Age               71 year(s)   Race                         Room Number       2117   Corporate ID #    [id number]   Patient Acct #    [de-identified]   MR #              [id number]       Pepe Landaverde   Accession #       [id number]   Interpreting Physician    Micah Whitt   Referring Nurse                Referring Physician       Berta Rucker  Practitioner  Procedure Type of Study:   Veins: Lower Extremities DVT Study, Venous Scan Lower Bilateral.  Indications for Study:Leg Swelling. Patient Status: In Patient. Technical Quality:Limited visualization. Limitation reason:Edema. Conclusions   Summary   Bilateral:  Technically limited study as stated above however in the visualized areas  no superficial or deep vein thrombosis was identified. Signature   ----------------------------------------------------------------  Electronically signed by James Hutson(Sonographer) on  07/12/2022 07:52 AM  ----------------------------------------------------------------   ----------------------------------------------------------------  Electronically signed by Micah Whitt(Interpreting physician)  on 07/13/2022 07:32 AM  ----------------------------------------------------------------  Findings:   Right Impression:                    Left Impression:  Non visualization of the peroneal    Non visualization of the peroneal  veins. veins. Remaining deep veins                 Remaining deep veins  demonstrate normal compressibility. demonstrate normal compressibility.    Normal compressibility of the great  Normal compressibility of the great  saphenous vein. saphenous vein. Normal compressibility of the small  Normal compressibility of the small  saphenous vein. saphenous vein. Velocities are measured in cm/s ; Diameters are measured in cm Right Lower Extremities DVT Study Measurements Right 2D Measurements +------------------------------------+----------+---------------+----------+ ! Location                            ! Visualized! Compressibility! Thrombosis! +------------------------------------+----------+---------------+----------+ ! Common Femoral                      !Yes       ! Yes            ! None      ! +------------------------------------+----------+---------------+----------+ ! Prox Femoral                        !Yes       ! Yes            ! None      ! +------------------------------------+----------+---------------+----------+ ! Mid Femoral                         !Yes       ! Yes            ! None      ! +------------------------------------+----------+---------------+----------+ ! Dist Femoral                        !Yes       ! Yes            ! None      ! +------------------------------------+----------+---------------+----------+ ! Popliteal                           !Yes       ! Yes            ! None      ! +------------------------------------+----------+---------------+----------+ ! Sapheno Femoral Junction            ! Yes       ! Yes            ! None      ! +------------------------------------+----------+---------------+----------+ ! PTV                                 ! Partial   !Yes            ! None      ! +------------------------------------+----------+---------------+----------+ ! Peroneal                            !No        !               !          ! +------------------------------------+----------+---------------+----------+ ! Gastroc                             ! Partial   !Yes            ! None      ! +------------------------------------+----------+---------------+----------+ !GSV Thigh                           ! Yes       ! Yes            ! None      ! +------------------------------------+----------+---------------+----------+ ! GSV Knee                            ! Yes       ! Yes            ! None      ! +------------------------------------+----------+---------------+----------+ ! GSV Ankle                           ! Yes       ! Yes            ! None      ! +------------------------------------+----------+---------------+----------+ ! SSV                                 ! Partial   !Yes            ! None      ! +------------------------------------+----------+---------------+----------+ Right Doppler Measurements +---------------------------+------+------+--------------------------------+ ! Location                   ! Signal!Reflux! Reflux (msec)                   ! +---------------------------+------+------+--------------------------------+ ! Common Femoral             !Phasic!      !                                ! +---------------------------+------+------+--------------------------------+ ! Prox Femoral               !Phasic!      !                                ! +---------------------------+------+------+--------------------------------+ ! Popliteal                  !Phasic!      !                                ! +---------------------------+------+------+--------------------------------+ Left Lower Extremities DVT Study Measurements Left 2D Measurements +------------------------------------+----------+---------------+----------+ ! Location                            ! Visualized! Compressibility! Thrombosis! +------------------------------------+----------+---------------+----------+ ! Common Femoral                      !Yes       ! Yes            ! None      ! +------------------------------------+----------+---------------+----------+ ! Prox Femoral                        !Yes       ! Yes            ! None      ! +------------------------------------+----------+---------------+----------+ ! Mid !Common Femoral             !Phasic!      !                                ! +---------------------------+------+------+--------------------------------+ ! Prox Femoral               !Phasic!      !                                ! +---------------------------+------+------+--------------------------------+ ! Popliteal                  !Phasic!      !                                ! +---------------------------+------+------+--------------------------------+    NM HEPATOBILIARY SCAN W PHARMACOLOGICAL INTERVENTION    Result Date: 7/11/2022  EXAMINATION: NUCLEAR MEDICINE HEPATOBILIARY SCINTIGRAPHY (HIDA SCAN). 7/11/2022 11:31 am TECHNIQUE: Approximately 4.9 mCi Tc-99m Mebrofenin (Choletec) was administered IV. Then, dynamic images of the abdomen were obtained in the anterior projection for 60 min(s). A right lateral view was also obtained at 60 min(s). The patient was in pain at the end of the 1st hour due to recent fractures. The patient was returned to her room and pain medications were given. The patient than return for a 2 hour image. COMPARISON: CT scan 07/10/2022 HISTORY: ORDERING SYSTEM PROVIDED HISTORY: acute cholecystitis TECHNOLOGIST PROVIDED HISTORY: acute cholecystitis Reason for Exam: acute cholecystitis FINDINGS: Prompt, homogenous uptake by the liver is noted with normal appearance of radiotracer excretion into the biliary system. Clearance of blood pool activity appears appropriate. Normal bowel activity was seen. There is probable accumulation of radiotracer within the gallbladder during the 1st hour, confirmed on the delayed images. The common bile duct and cystic duct are patent. No acute cholecystitis.              On admission     52-year-old female presenting with a fall which happened 2 days ago  Resulted in head injury to right side of head patient is on blood thinners-CT head negative in ER  Complaining of left hip pain at presentation unclear onset     Imaging in ER also showed right lateral 10th rib fracture, subacute sternal body fracture, multiple old left-sided rib fractures     Medical history includes CAD, CHF, GERD, hypertension, hyperlipidemia, prediabetes, history of recurrent DVT    Review of Systems:     As recorded in HPI          Physical Examination:        Vitals:    07/13/22 0715 07/13/22 0839 07/13/22 1145 07/13/22 1204   BP: 132/61  (!) 142/57 (!) 113/91   Pulse: 73   80   Resp: 18 17  18   Temp: 99 °F (37.2 °C)   98.3 °F (36.8 °C)   TempSrc: Oral   Oral   SpO2: 94%   97%   Weight:       Height:           Recent Labs     07/12/22  1131 07/12/22  1639 07/13/22  0719 07/13/22  1114   POCGLU 142* 116* 105 154*       Intake/Output Summary (Last 24 hours) at 7/13/2022 1218  Last data filed at 7/13/2022 1218  Gross per 24 hour   Intake 2025 ml   Output 1000 ml   Net 1025 ml       General Appearance:  alert, well appearing, and in no acute distress  Mental status:   Head:  normocephalic, atraumatic. Eye: no icterus, redness, pupils equal and reactive, extraocular eye movements intact, conjunctiva clear  Ear: normal external ear, no discharge, hearing intact  Nose:  no drainage noted  Mouth: mucous membranes moist  Neck: supple, no carotid bruits, thyroid not palpable  Lungs: Bilateral equal air entry, clear to ausculation, no wheezing, rales or rhonchi, normal effort  Cardiovascular: normal rate, regular rhythm, no murmur, gallop, rub.   Abdomen: Soft, nontender, nondistended, normal bowel sounds, no hepatomegaly or splenomegaly  Neurologic: There are no new focal motor or sensory deficits,   Skin: No gross lesions, rashes, bruising or bleeding on exposed skin area  Extremities:  peripheral pulses palpable, no pedal edema or calf pain with palpation  Psych:             Data:     PLabs:    BMP:   Recent Labs     07/12/22  0500 07/13/22  0707    141   K 3.6* 3.5*   CO2 22 24   BUN 48* 28*   CREATININE 1.40* 0.79   LABGLOM 37* >60   GLUCOSE 126* 106*                 Medications: Allergies: Allergies   Allergen Reactions    Bactrim [Sulfamethoxazole-Trimethoprim] Other (See Comments)     confusion    Codeine Itching    Diazepam Other (See Comments)    Meperidine Hcl Other (See Comments)    Seasonal        Current Meds:   Scheduled Meds:    cefTRIAXone (ROCEPHIN) IV  1,000 mg IntraVENous Q24H    apixaban  5 mg Oral BID    sodium chloride flush  5-40 mL IntraVENous 2 times per day    pantoprazole (PROTONIX) 40 mg injection  40 mg IntraVENous Daily    atorvastatin  40 mg Oral Nightly    busPIRone  5 mg Oral TID    citalopram  20 mg Oral Daily    ferrous sulfate  325 mg Oral BID    pramipexole  0.5 mg Oral Nightly     Continuous Infusions:    sodium chloride      sodium chloride 75 mL/hr at 07/13/22 0613    dextrose       PRN Meds: sodium chloride flush, sodium chloride flush, sodium chloride flush, sodium chloride, ondansetron **OR** ondansetron, polyethylene glycol, acetaminophen **OR** acetaminophen, fentanNYL, fentanNYL, oxyCODONE-acetaminophen, melatonin, traZODone, glucose, dextrose bolus **OR** dextrose bolus, glucagon (rDNA), dextrose, ipratropium-albuterol          Assessment:        Primary Problem  Fracture of body of sternum, initial encounter for open fracture    Principal Problem:    Fracture of body of sternum, initial encounter for open fracture  Active Problems:    Nondisplaced fracture of sternal end of left clavicle, initial encounter for closed fracture    Erysipelas of right lower extremity    Closed fracture of body of sternum    Calculus of gallbladder without cholecystitis without obstruction    Leukocytosis    Type 2 diabetes mellitus with stage 3 chronic kidney disease ([facility])    Allergy to sulfa drugs    Deep vein thrombosis (DVT) of right lower extremity ([facility])  Resolved Problems:    * No resolved hospital problems.  *       Plan:          7/13/22    Fracture of the sternum - supportive care, pain control    cellulitis of the RLE - on rocephin 1 g daily, ID onboard, negative venous dopplers    BIN - creatinine improved 0.79 today    CHFpEF - continue lipitor    Recurrent DVT - on eliquis 5 mg BID    Abdominal pain - cholelithiasis without cholecystitis    Hypokalemia - replace as needed        . Hospital Problems           Last Modified POA    * (Principal) Fracture of body of sternum, initial encounter for open fracture 7/10/2022 Yes    Nondisplaced fracture of sternal end of left clavicle, initial encounter for closed fracture 7/10/2022 Yes    Erysipelas of right lower extremity 7/11/2022 Yes    Closed fracture of body of sternum 7/11/2022 Yes    Calculus of gallbladder without cholecystitis without obstruction 7/11/2022 Yes    Leukocytosis 7/11/2022 Yes    Type 2 diabetes mellitus with stage 3 chronic kidney disease ([address] 7/11/2022 Yes    Allergy to sulfa drugs 7/11/2022 Yes    Deep vein thrombosis (DVT) of right lower extremity ([address] 7/11/2022 Yes                         Thanks for consulting us . Will monitor vitals and clinical course , and  Optimize therapy  as needed . Radha Neumann MD  7/13/2022     Attestation and add on       I have discussed the care of Shailesh Singletary , including pertinent history and exam findings,      7/13/22    with the resident. I have seen and examined the patient and the key elements of all parts of the encounter have been performed by me . I agree with the assessment, plan and orders as documented by the resident.      Hospital Problems           Last Modified POA    * (Principal) Fracture of body of sternum, initial encounter for open fracture 7/10/2022 Yes    Nondisplaced fracture of sternal end of left clavicle, initial encounter for closed fracture 7/10/2022 Yes    Erysipelas of right lower extremity 7/11/2022 Yes    Closed fracture of body of sternum 7/11/2022 Yes    Calculus of gallbladder without cholecystitis without obstruction 7/11/2022 Yes    Leukocytosis 7/11/2022 Yes    Type 2

## 2022-07-13 NOTE — PROGRESS NOTES
Occupational Therapy  Facility/Department: [address] [place] [address]  Occupational Therapy Initial Assessment    Name: Nela Mcdonald  : 1950  MRN: [id number]  Date of Service: 2022    Discharge Recommendations:  Patient would benefit from continued therapy after discharge  OT Equipment Recommendations  Other: TBD       Patient Diagnosis(es): The primary encounter diagnosis was Closed fracture of body of sternum, initial encounter. Diagnoses of Closed fracture of one rib of right side, initial encounter and Contusion of face, initial encounter were also pertinent to this visit. Past Medical History:  has a past medical history of Allergic rhinitis, cause unspecified, Back pain, Bowel obstruction (HCC), C. difficile diarrhea, CAD (coronary artery disease), Cardiac murmur, Cellulitis, Cellulitis, Cerebral artery occlusion with cerebral infarction ([address] COVID-19, Diverticulosis of colon (without mention of hemorrhage), GERD (gastroesophageal reflux disease), GERD (gastroesophageal reflux disease), History of blood transfusion, History of CHF (congestive heart failure), History of MI (myocardial infarction), History of ovarian cyst, History of peritonitis, HTN (hypertension), Hx of blood clots, Hyperlipidemia, Intestinal or peritoneal adhesions with obstruction (postoperative) (postinfection) ([address] Kidney infection, Lateral epicondylitis  of elbow, MDRO (multiple drug resistant organisms) resistance, Muscle strain, Other abnormal glucose, PONV (postoperative nausea and vomiting), Pre-diabetes, Restless legs syndrome (RLS), Snores, 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[address] TIA (transient ischemic attack), Uses walker, Vitamin D deficiency, Wears glasses, and Wellness examination. Past Surgical History:  has a past surgical history that includes Ovary removal (); colectomy (); Appendectomy (); Ovary removal (); Hysterectomy (); Bunionectomy (Left); sinus surgery ();  Colonoscopy; right side of her face on a door. She hit her right side on something but she is not sure what. There is no loss conscious. Also complained of left hip pain however she is unclear if this is from the fall or not. Worst pain at this time is pain in her right rib cage and right upper abdomen. No neck pain or back pain that is new. She is complain of a mild headache. No blurred or double vision. Family / Caregiver Present: No  Referring Practitioner: Sarah Pino MD  Diagnosis: Fracture of body of sternum, right lateral rib fracture  General Comment  Comments: Okay for OT PT evaluation and treat per RN Bar Hurtado.      Social/Functional History  Social/Functional History  Lives With: Spouse  Type of Home: House  Home Layout: One level  Home Access: Stairs to enter without rails  Entrance Stairs - Number of Steps: 1 (Pt reports height of step is 6\"- 8\")  Bathroom Shower/Tub: Tub/Shower unit,Curtain,Shower chair with back  Bathroom Toilet: Handicap height  Bathroom Equipment: Grab bars around toilet,Grab bars in shower,Shower chair,Hand-held shower  Bathroom Accessibility: Accessible  Home Equipment: Cane,Cane, quad,Rollator,Alert Button,Lift chair,Hospital bed,Walker, rolling  Has the patient had two or more falls in the past year or any fall with injury in the past year?: Yes (Pt reported 5 falls since last admission (early June))  Receives Help From: Family  ADL Assistance: Needs assistance (IND dressing/toileting, supervision for showers)  Homemaking Assistance: Needs assistance (Cleaning lady,  does groceries, pt A as able)  Homemaking Responsibilities: Yes (SO does grocery shopping, clening lady comes every other wee)  Ambulation Assistance: Independent (with rollator)  Transfer Assistance: Independent (Rollator)  Active : No  Patient's  Info: spouse  Mode of Transportation: SUV,Truck  Occupation: Retired  Type of Occupation:    IADL Comments: intermittently sleeps in hospital bed; alternate option is lift chair   Additional Comments: Pt reported her recent fall was while carrying a large box of food, then falling into the door. Pt's  was recently hospitalized and not able to assist with IADLs. daughter lives near by (does laundry, get pills ready, and helps shower) comes Monday and Thursday. 7 grand children       Objective   Heart Rate: 80  Heart Rate Source: Monitor  BP: (!) 113/91  BP Location: Left upper arm  BP Method: Automatic  Patient Position: Semi fowlers  MAP (Calculated): 98.33  Resp: 19  SpO2: 97 %  O2 Device: None (Room air)             Safety Devices  Type of Devices: All fall risk precautions in place;Call light within reach;Gait belt;Patient at risk for falls; Left in chair;Nurse notified     Toilet Transfers  Toilet - Technique: Ambulating  Equipment Used: Grab bars  Toilet Transfer: Minimal assistance  AROM: Generally decreased, functional  Strength: Generally decreased, functional  Coordination: Generally decreased, functional  Tone: Normal  Sensation: Intact  ADL  Feeding: Setup  Grooming: Setup  UE Bathing: Contact guard assistance  LE Bathing: Maximum assistance  UE Dressing: Contact guard assistance  LE Dressing: Maximum assistance  Toileting: Maximum assistance  Additional Comments: ADL scores based on skilled observation and clinical reasoning, unless otherwise noted. Pt requested to attempt to have BM on toilet vs. BSC. Pt requested assist with donning B shoes. She was able to pull brief off R side of hip but required assist pulling off L side.  Pt is currently limited by significant pain and limited mobility, impacting independence with self care  Tone RUE  RUE Tone: Normotonic  Tone LUE  LUE Tone: Normotonic  Coordination  Movements Are Fluid And Coordinated: No  Coordination and Movement Description: Fine motor impairments;Right UE;Left UE     Bed mobility  Rolling to Left: Moderate assistance  Supine to Sit: Minimal assistance;2 Person assistance  Scooting: Minimal assistance  Bed Mobility Comments: HOB elevated with use of handrails, significant increase time to complete due to pain in R rib cage  Transfers  Sit to stand: Minimal assistance  Stand to sit: Minimal assistance  Transfer Comments: Min cues for hand placement     Cognition  Overall Cognitive Status: WFL  Orientation  Overall Orientation Status: Within Functional Limits  Orientation Level: Oriented X4        Sensation  Overall Sensation Status: WFL (denies)         Education Given To: Patient  Education Provided: Role of Therapy;Plan of Care  Education Method: Verbal  Barriers to Learning: None  Education Outcome: Continued education needed  LUE AROM (degrees)  LUE AROM : Exceptions  LUE General AROM: WFL except  L Shoulder Flexion 0-180: 0-90  Left Hand AROM (degrees)  Left Hand AROM: WFL  RUE AROM (degrees)  RUE AROM : Exceptions  RUE General AROM: WFL except  R Shoulder Flexion 0-180: 0-90  Right Hand AROM (degrees)  Right Hand AROM: WFL  LUE Strength  L Hand General: 4-/5  LUE Strength Comment: MMT not assessed due to pain  RUE Strength  R Hand General: 4-/5  RUE Strength Comment: MMT not assessed due to pain             AM-PAC Score        -[facility] Inpatient Daily Activity Raw Score: 15 (07/13/22 1244)  AM-PAC Inpatient ADL T-Scale Score : 34.69 (07/13/22 1244)  ADL Inpatient CMS 0-100% Score: 56.46 (07/13/22 1244)  ADL Inpatient CMS G-Code Modifier : CK (07/13/22 1244)    Goals  Short Term Goals  Time Frame for Short term goals: By discharge  Short Term Goal 1: Pt will perform UB self care with supervision and Good safety  Short Term Goal 2: Pt will perform LB self care with SBA, using appropriate adaptive equipment/adaptive strategies, and Good safety  Short Term Goal 3: Pt will tolerate standing for 3-5 minutes during 1-2 handed functional tasks to improve independence and safety with standing ADLs/IADLs.    Short Term Goal 4: Pt will perform functional transfers/mobility with SBA, using least restrictive device, during self care  Short Term Goal 5: Pt will actively participate in 15+ minutes of therapeutic exercise/functional activity to promote safety and independence with self care and mobility       Therapy Time   Individual Concurrent Group Co-treatment   Time In 0912         Time Out 0955         Minutes 43         Timed Code Treatment Minutes: Carlos Gregory OT

## 2022-07-13 NOTE — PROGRESS NOTES
Dr. Marquez Dobbins notified patients potassium was 3.5. Dr. Marquez Dobbins didn't want to replace at this time.

## 2022-07-13 NOTE — PLAN OF CARE
Problem: Discharge Planning  Goal: Discharge to home or other facility with appropriate resources  7/13/2022 0531 by Dori Lopez RN  Outcome: Progressing     Problem: Pain  Goal: Verbalizes/displays adequate comfort level or baseline comfort level  7/13/2022 0531 by Dori Lopez RN  Outcome: Progressing  Flowsheets (Taken 7/13/2022 0531)  Verbalizes/displays adequate comfort level or baseline comfort level:   Encourage patient to monitor pain and request assistance   Assess pain using appropriate pain scale   Administer analgesics based on type and severity of pain and evaluate response  Note: Pt medicated with pain medication prn. Assessed all pain characteristics including level, type, location, frequency, and onset. Non-pharmacologic interventions offered to pt as well. Pt states pain is tolerable at this time. Will continue to monitor.       Problem: Safety - Adult  Goal: Free from fall injury  7/13/2022 0531 by Dori Lopez RN  Outcome: Progressing

## 2022-07-13 NOTE — PLAN OF CARE
Problem: Discharge Planning  Goal: Discharge to home or other facility with appropriate resources  7/13/2022 1446 by Conchita Dang RN  Outcome: Progressing     Problem: Pain  Goal: Verbalizes/displays adequate comfort level or baseline comfort level  7/13/2022 1446 by Conchita Dang RN  Outcome: Progressing     Problem: Safety - Adult  Goal: Free from fall injury  7/13/2022 1446 by Conchita Dang RN  Outcome: Progressing     Problem: ABCDS Injury Assessment  Goal: Absence of physical injury  Outcome: Progressing

## 2022-07-13 NOTE — CONSULTS
VASCULAR SURGERY H&P      Subjective:    Ms. Weber is a 71year old female for whom our service was consulted due to cellulitis affecting the right lower extremity. She reports a history of bilateral lower extremity swelling that worsens throughout the day and improves somewhat with leg elevation. She also has a history of myocardial infarction and congestive heart failure. Ms. Weber reports a history of right lower extremity DVT \"years ago\". She had been on anticoagulation and states that it was withdrawn, but is now under the impression that she takes Eliqus due to her history of cellulitis. 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 I did find a documentation entry suggesting that she had an office appointment with Dr. Tammi Briceño in 2018, but no associated file was uploaded and I am unable to see any notes related top that encounter. She was admitted on 7/10/22 for pain control status post fall. CT brain was negative in the emergency department. Ms. Weber reports recurrent cellulitis of the lower legs, and a skin tear over the right shin, sustained when she fell. She wears compression socks with some regularity in general, but avoids them during the summer, which is when her swelling worsens. Objective:      Past Medical History  Past Medical History:   Diagnosis Date    Allergic rhinitis, cause unspecified     Back pain     lumbar    Bowel obstruction (HCC)     history of due to scar tissue, resolved non-surgically    C. difficile diarrhea     CAD (coronary artery disease)     no stent needed per pt.  Dr. Yennifer Tay did cath at  2005    Cardiac murmur     Cellulitis     left leg    Cellulitis 2017 August    leg left leg/bug bite    Cerebral artery occlusion with cerebral infarction [facility])     TIA 2014    COVID-19     ONE YR AGO IN 4/25/2020 fever and cough    Diverticulosis of colon (without mention of hemorrhage)     GERD (gastroesophageal reflux disease)     GERD (gastroesophageal reflux disease)     on rx    History of blood transfusion     approx 2020        History of CHF (congestive heart failure)     History of MI (myocardial infarction) 2005    thought due to a blood clot    History of ovarian cyst 1970    had oopherectomy holly    History of peritonitis 1968    due to ruptured appendix age 13    HTN (hypertension)     Hx of blood clots     right leg    Hyperlipidemia     Intestinal or peritoneal adhesions with obstruction (postoperative) (postinfection) ([address]     Kidney infection     renal failure/sepsis/spider bite    Lateral epicondylitis  of elbow     MDRO (multiple drug resistant organisms) resistance     c diff    Muscle strain     right posterior shoulder    Other abnormal glucose     PONV (postoperative nausea and vomiting)     dry heaves    Pre-diabetes     Restless legs syndrome (RLS)     Snores     no cpap    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)     TIA (transient ischemic attack) 2014    Uses walker     Vitamin D deficiency     Wears glasses     Wellness examination     last seen 2 weeks ago        Past Surgical History   Past Surgical History:   Procedure Laterality Date    ABDOMEN SURGERY  1976    benign tumor removed near remaining ovary, 1.5 pounds    APPENDECTOMY  1968    appendix ruptured, developed peritonitis    BACK SURGERY      BUNIONECTOMY Left     along with calcium deposits removed   [address]  2005    negative    CERVICAL FUSION  05/21/2021    POSTERIOR C3-6 LAMINECTOMY, PARTIAL C7 LAMINECTOMY, FUSION C3-C6, SILVERCORD    CERVICAL FUSION N/A 5/21/2021    POSTERIOR C3-6 LAMINECTOMY, PARTIAL C7 LAMINECTOMY, FUSION C3-C6, SILVERCORD performed by Dre Friedman DO at [address]    12 INCHES REMOVED D/T OBSTRUCTION    COLONOSCOPY      CYST REMOVAL Right     right facial    HYSTERECTOMY (CERVIX STATUS UNKNOWN)  1973    taken as a result of recurring cysts    LUMBAR FUSION N/A 02/10/2020    LUMBAR L4-5 POSTERIOR  DECOMPRESSION INSTRUMENTATION FUSION WCEMENT AUGMENTATION/ performed by Brandon Cabello MD at [facility] N/A 2020    L5-S1 PLIF L4-L5 REVISION performed by Brandon Cabello MD at [facility] 6  2014    [address]    UNILATERAL due to cyst    OVARY REMOVAL  1971    partial, due to cyst    SINUS SURGERY      UPPER GASTROINTESTINAL ENDOSCOPY N/A 2019    EGD ESOPHAGOGASTRODUODENOSCOPY performed by Nehemias Engel MD at [facility] N/A 2019    EGD BIOPSY performed by Donna West MD at [address] [place] [address] N/A 2019    EGD BIOPSY performed by Nehemias Engel MD at [address] [facility] 2019    WRIST OPEN REDUCTION INTERNAL FIXATION performed by Ameena Avila MD at [address] History     Socioeconomic History    Marital status:      Spouse name: Not on file    Number of children: Not on file    Years of education: Not on file    Highest education level: Not on file   Occupational History    Occupation: retired   Tobacco Use    Smoking status: Former Smoker     Packs/day: 0.50     Years: 20.00     Pack years: 10.00     Types: Cigarettes     Start date: 1995     Quit date: 2017     Years since quittin.0    Smokeless tobacco: Never Used   Vaping Use    Vaping Use: Never used   Substance and Sexual Activity    Alcohol use: No     Alcohol/week: 0.0 standard drinks    Drug use: No    Sexual activity: Not on file   Other Topics Concern    Not on file   Social History Narrative    Not on file     Social Determinants of Health     Financial Resource Strain:     Difficulty of Paying Living Expenses: Not on file   Food Insecurity:     Worried About [address] [employer] in the Last Year: Not on file    [address] in the Last Year: Not on file   Transportation Needs:     Lack of Transportation (Medical): Not on file    Lack of Transportation (Non-Medical):  Not on file   Physical Activity:     Days of Exercise per Week: Not on file    Minutes of Exercise per Session: Not on file   Stress:     Feeling of Stress : Not on file   Social Connections:     Frequency of Communication with Friends and Family: Not on file    Frequency of Social Gatherings with Friends and Family: Not on file    Attends [employer] Services: Not on file    Active Member of Clubs or Organizations: Not on file    Attends Club or Organization Meetings: Not on file    Marital Status: Not on file   Intimate Partner Violence:     Fear of Current or Ex-Partner: Not on file    Emotionally Abused: Not on file    Physically Abused: Not on file    Sexually Abused: Not on file   Housing Stability:     Unable to Pay for Housing in the Last Year: Not on file    Number of Places Lived in the Last Year: Not on file    Unstable Housing in the Last Year: Not on file        Current Medications   cefTRIAXone (ROCEPHIN) IV  1,000 mg IntraVENous Q24H    apixaban  5 mg Oral BID    sodium chloride flush  5-40 mL IntraVENous 2 times per day    pantoprazole (PROTONIX) 40 mg injection  40 mg IntraVENous Daily    atorvastatin  40 mg Oral Nightly    busPIRone  5 mg Oral TID    citalopram  20 mg Oral Daily    ferrous sulfate  325 mg Oral BID    pramipexole  0.5 mg Oral Nightly        Allergies  Allergies   Allergen Reactions    Bactrim [Sulfamethoxazole-Trimethoprim] Other (See Comments)     confusion    Codeine Itching    Diazepam Other (See Comments)    Meperidine Hcl Other (See Comments)    Seasonal         Labs  Recent Labs     07/13/22  0535 07/12/22  0500 07/11/22  0455   WBC 9.5 11.7* 14.6*   HGB 11.0* 11.2* 12.2   HCT 32.5* 34.9* 34.6*   MCV 95.0 96.5 92.5    205 225     Lab Results   Component Value Date/Time  07/13/2022 07:07 AM    K 3.5 07/13/2022 07:07 AM     07/13/2022 07:07 AM    CO2 24 07/13/2022 07:07 AM    BUN 28 07/13/2022 07:07 AM    CREATININE 0.79 07/13/2022 07:07 AM    GLUCOSE 106 07/13/2022 07:07 AM    CALCIUM 8.2 07/13/2022 07:07 AM      Lab Results   Component Value Date    CKTOTAL 98 03/29/2022    CKMB 14.9 (H) 05/31/2019     Lab Results   Component Value Date    ALT 15 07/11/2022    AST 30 07/11/2022    ALKPHOS 36 07/11/2022    BILITOT 0.60 07/11/2022      Imaging    Lower extremity venous duplex was negative for DVT to within the limits of the study. I/O  I/O last 3 completed shifts: In: 9368 [I.V.:3874]  Out: 1250 [Urine:1250]  I/O this shift:  In: 222 [P.O.:222]  Out: 350 [Urine:350]      Vitals  Vitals:    07/13/22 1638   BP: (!) 143/63   Pulse: 80   Resp: (!) 2   Temp: 98.2 °F (36.8 °C)   SpO2: 93%          Physical Examination    General: Somewhat slow to respond, but alert and appropriate. Eating dinner. Eyes: PERRL, EOMI, no scleral icterus or conjunctivitis  HENT: No facial droop noted, no bleeding or discharge from the ears or nares. Neck: No carotid bruit noted, neck supple with full ROM  CV: RRR, no M/G/R noted. Readily palpable pedal pulses. Respiratory: No respiratory distress, effort normal.  Extremities: Able to move all extremities. Skin: Bilateral lower extremity swelling with 1-2+ pitting edema bilaterally and mild erythema. Superficial abrasion over the anterolateral aspect of the right lower leg. No focal swelling, fluctuance, induration, bleeding or drainage at this time. Psych: Conversational content is appropriate. Assessment:    Bilateral lower extremity swelling and cellulitis    Plan:    Ms. Som Wallace white count has resolved after progressive and substantial improvement over the past several days. She has palpable pulses and no evidence of acute DVT, though peroneal veins were not visualized.  From the standpoint of her cellulitis, I do not believe that intervention is warranted. Chronic swelling seems the likeliest risk ractor for her recurrent cellulitis and I recommended outpatient follow up with venous insufficiency studies, as well as adherence to compression therapy. If she can tolerate gentle compression with ACE bandages, I recommend that during admission. We will plan to see her on an outpatient basis with further testing. Thank you for allowing us to participate in Ms. Som oconnell. Your referrals are sincerely appreciated. Please do not hesitate to contact our service with any questions or concerns regarding her management.     Nasreen Fernandez PA-C  [facility]  Daytime office/After-hours call #: [phone]  Pager: [phone]

## 2022-07-13 NOTE — CARE COORDINATION
ADD:   SW notified by Navya Cartwright, MEENU Case Manager Supervisor that it is okay to start precert for pt to not hold up DC. [facility] has started precert for pt today 5/58/03.     Electronically signed by HIPOLITO Braun on 7/13/2022 at 4:36 PM

## 2022-07-13 NOTE — PROGRESS NOTES
Physical Therapy  Facility/Department: [address] [place] [address]  Physical Therapy Initial Assessment    Name: Durga Rivas  : 1950  MRN: [id number]  Date of Service: 2022    Discharge Recommendations:  Patient would benefit from continued therapy after discharge,Therapy recommended at discharge          Patient Diagnosis(es): The primary encounter diagnosis was Closed fracture of body of sternum, initial encounter. Diagnoses of Closed fracture of one rib of right side, initial encounter and Contusion of face, initial encounter were also pertinent to this visit. Past Medical History:  has a past medical history of Allergic rhinitis, cause unspecified, Back pain, Bowel obstruction (HCC), C. difficile diarrhea, CAD (coronary artery disease), Cardiac murmur, Cellulitis, Cellulitis, Cerebral artery occlusion with cerebral infarction ([address] COVID-19, Diverticulosis of colon (without mention of hemorrhage), GERD (gastroesophageal reflux disease), GERD (gastroesophageal reflux disease), History of blood transfusion, History of CHF (congestive heart failure), History of MI (myocardial infarction), History of ovarian cyst, History of peritonitis, HTN (hypertension), Hx of blood clots, Hyperlipidemia, Intestinal or peritoneal adhesions with obstruction (postoperative) (postinfection) ([address] Kidney infection, Lateral epicondylitis  of elbow, MDRO (multiple drug resistant organisms) resistance, Muscle strain, Other abnormal glucose, PONV (postoperative nausea and vomiting), Pre-diabetes, Restless legs syndrome (RLS), Snores, 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[address] TIA (transient ischemic attack), Uses walker, Vitamin D deficiency, Wears glasses, and Wellness examination. Past Surgical History:  has a past surgical history that includes Ovary removal (); colectomy (); Appendectomy (); Ovary removal (); Hysterectomy (); Bunionectomy (Left); sinus surgery ();  Colonoscopy; other surgical history (08/14/2014); cyst removal (Right); Wrist fracture surgery (Left, 03/05/2019); Upper gastrointestinal endoscopy (N/A, 05/31/2019); Upper gastrointestinal endoscopy (N/A, 08/05/2019); Upper gastrointestinal endoscopy (N/A, 08/23/2019); Abdomen surgery (1976); lumbar fusion (N/A, 02/10/2020); Cardiac catheterization; Cardiac catheterization (2005); Stuart tooth extraction; lumbar fusion (N/A, 06/17/2020); back surgery; cervical fusion (05/21/2021); and cervical fusion (N/A, 5/21/2021). Assessment   Assessment: pt presents to PT with Generalized weakness, increasesd pain, impaired balance, resulting in overall decreased tolerance for activity and functional mobility deficits. The patient requires 2 person assist for bed mobility and 1 person assistance to perrform functional mobility safely with maximal cues for technique to maximize safety throughout. Per pt; her  is unable to provide physical support d/t his current medical conditions therefor rendering the pt unsafe to return home to prior living arrangements. The patient will benefit from continued PT in inpatient setting to address deficits, improve safety, decrease fall risk, and progress pt toward prior level of independence. Treatment Diagnosis: Impaired functional mobility 2* Fracture of sternal body and clavicle with multiple contusions s/p mechanical fall. Specific Instructions for Next Treatment: BLE strengthening with focus on hip flexors and core; Focused body mechanics training during descent to chair; Progress ambulation distance  Therapy Prognosis: Fair  Decision Making: Medium Complexity  Exam: ROM, MMT, Balance, and functional mobility assessments  Clinical Presentation: pt is alert, pleasant, and cooperative throughout; limited by pain and weakness. Requires PT Follow-Up: Yes  Activity Tolerance  Activity Tolerance: Patient tolerated evaluation without incident;Patient limited by pain; Patient limited by endurance  Activity Tolerance Comments: Pt retires to bedside chair at end of session; pt unsuccessful with attempt at [facility] on Toilet. Requires inc time to complete all mobility tasks 2* increased RLE and Rib pain as well as overall weakness. Plan   Plan  Plan: 6-7 times per week  Specific Instructions for Next Treatment: BLE strengthening with focus on hip flexors and core; Focused body mechanics training during descent to chair; Progress ambulation distance  Current Treatment Recommendations: Strengthening,ROM,Balance training,Functional mobility training,Transfer training,Endurance training,Gait training,Pain management,Safety education & training,Therapeutic activities  Safety Devices  Type of Devices: All fall risk precautions in place,Call light within reach,Gait belt,Patient at risk for falls,Left in chair,Nurse notified     Restrictions  Restrictions/Precautions  Restrictions/Precautions: Fall Risk,General Precautions  Required Braces or Orthoses?: No  Implants present? : Metal implants (Spinal Fusion Hardware)  Position Activity Restriction  Other position/activity restrictions: subacute sternum fracture; avoid excessive pushing/pulling/lifting     Subjective   Pain: Pt reported 8/10 pain in sternum  General  Chart Reviewed: Yes  Patient assessed for rehabilitation services?: Yes  Additional Pertinent Hx: 42-year-old female who presented to the ED s/p fall 2 days ago. Patient states she trying to open a box of food and she slipped and fell. She hit her right side on something, she is not sure what. Angel loss of consciousness. Associated symptoms include left hip pain, pain to her right rib cage and right upper abdomen. No associated symptoms of headache, neck or back pain. She is on blood thinners. Response To Previous Treatment: Not applicable  Family / Caregiver Present: No  Referring Practitioner: KYLEIGH Lantigua  Referral Date : 07/11/22  Diagnosis: Fracture of sternum body 2* to mechanical fall  Follows Commands: Within Functional Limits  Subjective  Subjective: Both Nurse and patient are agreeable to therapy assessments; Pt reports \"I havn't had a BM in three days\" and is interested in attempting use of toilet in bathroom         Social/Functional History  Social/Functional History  Lives With: Spouse  Type of Home: House  Home Layout: One level  Home Access: Stairs to enter without rails  Entrance Stairs - Number of Steps: 1 (Pt reports height of step is 6\"- 8\")  Bathroom Shower/Tub: Tub/Shower unit,Curtain,Shower chair with back  Bathroom Toilet: Handicap height  Bathroom Equipment: Grab bars around toilet,Grab bars in shower,Shower chair,Hand-held shower  Bathroom Accessibility: Accessible  Home Equipment: Cane,Cane, quad,Rollator,Alert Button,Lift chair,Hospital bed,Walker, rolling  Has the patient had two or more falls in the past year or any fall with injury in the past year?: Yes (Pt reported 5 falls since last admission (early June))  Receives Help From: Family  ADL Assistance: Needs assistance (IND dressing/toileting, supervision for showers)  Homemaking Assistance: Needs assistance (Cleaning lady,  does groceries, pt A as able)  Homemaking Responsibilities: Yes (SO does grocery shopping, clening lady comes every other wee)  Ambulation Assistance: Independent (with rollator)  Transfer Assistance: Independent (Rollator)  Active : No  Patient's  Info: spouse  Mode of Transportation: SUV,Truck  Occupation: Retired  Type of Occupation:    IADL Comments: intermittently sleeps in hospital bed; alternate option is lift chair   Additional Comments: Pt reported her recent fall was while carrying a large box of food, then falling into the door. Pt's  was recently hospitalized and not able to assist with IADLs. daughter lives near by (does laundry, get pills ready, and helps shower) comes Monday and Thursday.  7 grand children  Vision/Hearing  Vision  Vision: Impaired  Vision Exceptions: Wears glasses for reading  Hearing  Hearing: Within functional limits    Cognition   Orientation  Overall Orientation Status: Within Functional Limits  Orientation Level: Oriented X4  Cognition  Overall Cognitive Status: WFL     Objective   Heart Rate: 80  Heart Rate Source: Monitor  BP: (!) 113/91  BP Location: Left upper arm  BP Method: Automatic  Patient Position: Semi fowlers  MAP (Calculated): 98.33  Resp: 19  SpO2: 97 %  O2 Device: None (Room air)     Observation/Palpation  Edema: RLE Redness and Swelling; + pain        PROM RLE (degrees)  RLE PROM: WFL  AROM RLE (degrees)  RLE General AROM: Overall partially limited d/t weakness and swelling; Decreased Hip/knee flexion noted  PROM LLE (degrees)  LLE PROM: WFL  AROM LLE (degrees)  LLE General AROM: Overall WFL however hip flexion is limited 2* weakness  AROM RUE (degrees)  RUE AROM :  (See OT)  AROM LUE (degrees)  LUE AROM :  (See OT)  Strength RLE  Strength RLE: Exception  Comment: Grossly 3+/5 except hip flexion 2+/5 and Knee flexion 2/5  Strength LLE  Strength LLE: Exception  Comment: Grossly 3+/5 except hip flexion 2+/5 and Knee flexion 2/5  Strength LUE  Strength LUE:  (See OT)  Strength Other  Other:  (See OT)     Sensation  Overall Sensation Status: WFL (denies)     Bed mobility  Rolling to Left: Moderate assistance  Supine to Sit: Minimal assistance;2 Person assistance  Sit to Supine: Unable to assess (pt retires to chair at end of session)  Scooting: Minimal assistance  Bed Mobility Comments: HOB elevated with use of handrails, significant increase time to complete due to pain in R rib cage. pt dangles EOB x4-5 minutes.   Transfers  Sit to Stand: Minimal Assistance  Stand to sit: Minimal Assistance  Stand Pivot Transfers: Minimal Assistance  Comment: RW for all transfers; Cues for safe hands placement and sitting surface proximity, pt with tendency to kick LLE out in front upon initiating transfers and then walks LLE back within NOT use purewick during day-time hours to promote inc frequency of mobility and to improve endurance  Education Method: Verbal  Barriers to Learning: None  Education Outcome: Verbalized understanding; Unable to demonstrate understanding;Continued education needed      Therapy Time   Individual Concurrent Group Co-treatment   Time In 0920         Time Out 1010         Minutes 50         Timed Code Treatment Minutes: 25 Minutes       Barnabas Dakins, PT    Electronically signed by Barnabas Dakins, PT on 7/13/2022 at 2:24 PM

## 2022-07-13 NOTE — CARE COORDINATION
Becca Park from [facility] will be coming to see/asess pt this afternoon.      Electronically signed by HIPOLITO Alfredo on 7/13/2022 at 11:23 AM

## 2022-07-14 LAB
ABSOLUTE EOS #: 0.3 K/UL (ref 0–0.4)
ABSOLUTE LYMPH #: 1.1 K/UL (ref 1–4.8)
ABSOLUTE MONO #: 0.7 K/UL (ref 0.1–1.3)
ANION GAP SERPL CALCULATED.3IONS-SCNC: 10 MMOL/L (ref 9–17)
BASOPHILS # BLD: 0 % (ref 0–2)
BASOPHILS ABSOLUTE: 0 K/UL (ref 0–0.2)
BUN BLDV-MCNC: 18 MG/DL (ref 8–23)
CALCIUM SERPL-MCNC: 8.4 MG/DL (ref 8.6–10.4)
CHLORIDE BLD-SCNC: 111 MMOL/L (ref 98–107)
CO2: 21 MMOL/L (ref 20–31)
CREAT SERPL-MCNC: 0.72 MG/DL (ref 0.5–0.9)
EOSINOPHILS RELATIVE PERCENT: 3 % (ref 0–4)
GFR AFRICAN AMERICAN: >60 ML/MIN
GFR NON-AFRICAN AMERICAN: >60 ML/MIN
GFR SERPL CREATININE-BSD FRML MDRD: ABNORMAL ML/MIN/{1.73_M2}
GLUCOSE BLD-MCNC: 99 MG/DL (ref 70–99)
HCT VFR BLD CALC: 34.6 % (ref 36–46)
HEMOGLOBIN: 11.2 G/DL (ref 12–16)
LYMPHOCYTES # BLD: 12 % (ref 24–44)
MCH RBC QN AUTO: 31.3 PG (ref 26–34)
MCHC RBC AUTO-ENTMCNC: 32.4 G/DL (ref 31–37)
MCV RBC AUTO: 96.7 FL (ref 80–100)
MONOCYTES # BLD: 7 % (ref 1–7)
PDW BLD-RTO: 14.1 % (ref 11.5–14.9)
PLATELET # BLD: 210 K/UL (ref 150–450)
PMV BLD AUTO: 7.3 FL (ref 6–12)
POTASSIUM SERPL-SCNC: 3.8 MMOL/L (ref 3.7–5.3)
RBC # BLD: 3.58 M/UL (ref 4–5.2)
SEG NEUTROPHILS: 78 % (ref 36–66)
SEGMENTED NEUTROPHILS ABSOLUTE COUNT: 7.8 K/UL (ref 1.3–9.1)
SODIUM BLD-SCNC: 142 MMOL/L (ref 135–144)
WBC # BLD: 9.9 K/UL (ref 3.5–11)

## 2022-07-14 PROCEDURE — 6360000002 HC RX W HCPCS: Performed by: NURSE PRACTITIONER

## 2022-07-14 PROCEDURE — 97530 THERAPEUTIC ACTIVITIES: CPT

## 2022-07-14 PROCEDURE — G0378 HOSPITAL OBSERVATION PER HR: HCPCS

## 2022-07-14 PROCEDURE — 99232 SBSQ HOSP IP/OBS MODERATE 35: CPT | Performed by: NURSE PRACTITIONER

## 2022-07-14 PROCEDURE — 6370000000 HC RX 637 (ALT 250 FOR IP): Performed by: NURSE PRACTITIONER

## 2022-07-14 PROCEDURE — 2580000003 HC RX 258: Performed by: SURGERY

## 2022-07-14 PROCEDURE — 96376 TX/PRO/DX INJ SAME DRUG ADON: CPT

## 2022-07-14 PROCEDURE — 36415 COLL VENOUS BLD VENIPUNCTURE: CPT

## 2022-07-14 PROCEDURE — 2580000003 HC RX 258: Performed by: NURSE PRACTITIONER

## 2022-07-14 PROCEDURE — 97116 GAIT TRAINING THERAPY: CPT

## 2022-07-14 PROCEDURE — 80048 BASIC METABOLIC PNL TOTAL CA: CPT

## 2022-07-14 PROCEDURE — 96366 THER/PROPH/DIAG IV INF ADDON: CPT

## 2022-07-14 PROCEDURE — 6370000000 HC RX 637 (ALT 250 FOR IP): Performed by: INTERNAL MEDICINE

## 2022-07-14 PROCEDURE — A4216 STERILE WATER/SALINE, 10 ML: HCPCS | Performed by: SURGERY

## 2022-07-14 PROCEDURE — 6370000000 HC RX 637 (ALT 250 FOR IP)

## 2022-07-14 PROCEDURE — 6370000000 HC RX 637 (ALT 250 FOR IP): Performed by: SURGERY

## 2022-07-14 PROCEDURE — 99232 SBSQ HOSP IP/OBS MODERATE 35: CPT | Performed by: INTERNAL MEDICINE

## 2022-07-14 PROCEDURE — 6360000002 HC RX W HCPCS: Performed by: SURGERY

## 2022-07-14 PROCEDURE — 85025 COMPLETE CBC W/AUTO DIFF WBC: CPT

## 2022-07-14 PROCEDURE — C9113 INJ PANTOPRAZOLE SODIUM, VIA: HCPCS | Performed by: SURGERY

## 2022-07-14 PROCEDURE — 97110 THERAPEUTIC EXERCISES: CPT

## 2022-07-14 RX ORDER — CARVEDILOL 12.5 MG/1
12.5 TABLET ORAL 2 TIMES DAILY
Status: DISCONTINUED | OUTPATIENT
Start: 2022-07-14 | End: 2022-07-21 | Stop reason: HOSPADM

## 2022-07-14 RX ORDER — AMLODIPINE BESYLATE 5 MG/1
5 TABLET ORAL DAILY
Status: DISCONTINUED | OUTPATIENT
Start: 2022-07-14 | End: 2022-07-21 | Stop reason: HOSPADM

## 2022-07-14 RX ADMIN — CITALOPRAM HYDROBROMIDE 20 MG: 20 TABLET ORAL at 08:39

## 2022-07-14 RX ADMIN — FERROUS SULFATE TAB 325 MG (65 MG ELEMENTAL FE) 325 MG: 325 (65 FE) TAB at 08:39

## 2022-07-14 RX ADMIN — OXYCODONE HYDROCHLORIDE AND ACETAMINOPHEN 1 TABLET: 5; 325 TABLET ORAL at 06:11

## 2022-07-14 RX ADMIN — OXYCODONE HYDROCHLORIDE AND ACETAMINOPHEN 1 TABLET: 5; 325 TABLET ORAL at 12:21

## 2022-07-14 RX ADMIN — APIXABAN 5 MG: 5 TABLET, FILM COATED ORAL at 08:39

## 2022-07-14 RX ADMIN — BUSPIRONE HYDROCHLORIDE 5 MG: 5 TABLET ORAL at 14:51

## 2022-07-14 RX ADMIN — BUSPIRONE HYDROCHLORIDE 5 MG: 5 TABLET ORAL at 21:03

## 2022-07-14 RX ADMIN — FERROUS SULFATE TAB 325 MG (65 MG ELEMENTAL FE) 325 MG: 325 (65 FE) TAB at 21:02

## 2022-07-14 RX ADMIN — CARVEDILOL 12.5 MG: 12.5 TABLET, FILM COATED ORAL at 10:43

## 2022-07-14 RX ADMIN — CEFTRIAXONE SODIUM 1000 MG: 1 INJECTION, POWDER, FOR SOLUTION INTRAMUSCULAR; INTRAVENOUS at 14:52

## 2022-07-14 RX ADMIN — APIXABAN 5 MG: 5 TABLET, FILM COATED ORAL at 21:03

## 2022-07-14 RX ADMIN — SODIUM CHLORIDE, PRESERVATIVE FREE 10 ML: 5 INJECTION INTRAVENOUS at 21:07

## 2022-07-14 RX ADMIN — OXYCODONE HYDROCHLORIDE AND ACETAMINOPHEN 1 TABLET: 5; 325 TABLET ORAL at 23:32

## 2022-07-14 RX ADMIN — AMLODIPINE BESYLATE 5 MG: 5 TABLET ORAL at 10:43

## 2022-07-14 RX ADMIN — BUSPIRONE HYDROCHLORIDE 5 MG: 5 TABLET ORAL at 08:39

## 2022-07-14 RX ADMIN — PRAMIPEXOLE DIHYDROCHLORIDE 0.5 MG: 0.25 TABLET ORAL at 21:03

## 2022-07-14 RX ADMIN — ATORVASTATIN CALCIUM 40 MG: 40 TABLET, FILM COATED ORAL at 21:03

## 2022-07-14 RX ADMIN — OXYCODONE HYDROCHLORIDE AND ACETAMINOPHEN 1 TABLET: 5; 325 TABLET ORAL at 19:33

## 2022-07-14 RX ADMIN — TRAZODONE HYDROCHLORIDE 50 MG: 50 TABLET ORAL at 22:06

## 2022-07-14 RX ADMIN — CARVEDILOL 12.5 MG: 12.5 TABLET, FILM COATED ORAL at 21:03

## 2022-07-14 RX ADMIN — PANTOPRAZOLE SODIUM 40 MG: 40 INJECTION, POWDER, FOR SOLUTION INTRAVENOUS at 08:39

## 2022-07-14 ASSESSMENT — PAIN DESCRIPTION - ORIENTATION
ORIENTATION: RIGHT

## 2022-07-14 ASSESSMENT — PAIN DESCRIPTION - DESCRIPTORS
DESCRIPTORS: CRUSHING;DISCOMFORT;CRAMPING
DESCRIPTORS: SHARP
DESCRIPTORS: ACHING
DESCRIPTORS: DISCOMFORT

## 2022-07-14 ASSESSMENT — PAIN SCALES - GENERAL
PAINLEVEL_OUTOF10: 8
PAINLEVEL_OUTOF10: 5
PAINLEVEL_OUTOF10: 8
PAINLEVEL_OUTOF10: 10
PAINLEVEL_OUTOF10: 10
PAINLEVEL_OUTOF10: 9

## 2022-07-14 ASSESSMENT — PAIN DESCRIPTION - LOCATION
LOCATION: CHEST
LOCATION: HIP
LOCATION: RIB CAGE;HIP
LOCATION: CHEST
LOCATION: HIP

## 2022-07-14 ASSESSMENT — PAIN DESCRIPTION - PAIN TYPE: TYPE: ACUTE PAIN

## 2022-07-14 NOTE — PLAN OF CARE
Attestation and add on       I have discussed the care of Amanda Rubio , including pertinent history and exam findings,      7/14/22    with the resident. I have seen and examined the patient and the key elements of all parts of the encounter have been performed by me . I agree with the assessment, plan and orders as documented by the resident. Hospital Problems           Last Modified POA    * (Principal) Fracture of body of sternum, initial encounter for open fracture 7/10/2022 Yes    Nondisplaced fracture of sternal end of left clavicle, initial encounter for closed fracture 7/10/2022 Yes    Erysipelas of right lower extremity 7/11/2022 Yes    Closed fracture of body of sternum 7/11/2022 Yes    Calculus of gallbladder without cholecystitis without obstruction 7/11/2022 Yes    Leukocytosis 7/11/2022 Yes    Type 2 diabetes mellitus with stage 3 chronic kidney disease ([facility] 7/11/2022 Yes    Allergy to sulfa drugs 7/11/2022 Yes    Deep vein thrombosis (DVT) of right lower extremity ([facility] 7/11/2022 Yes         Vitals:    07/13/22 1749 07/13/22 1937 07/14/22 0616 07/14/22 1043   BP:  138/60 (!) 152/62 (!) 151/79   Pulse:  82 82 78   Resp: 18 20 18    Temp:  99 °F (37.2 °C) 98.8 °F (37.1 °C)    TempSrc:       SpO2:  95% 96%    Weight:       Height:              --discontinue iv . Resume norvasc , coreg -- ; labs reviewed ok        Medications: Allergies:     Allergies   Allergen Reactions    Bactrim [Sulfamethoxazole-Trimethoprim] Other (See Comments)     confusion    Codeine Itching    Diazepam Other (See Comments)    Meperidine Hcl Other (See Comments)    Seasonal        Current Meds:   Scheduled Meds:    amLODIPine  5 mg Oral Daily    carvedilol  12.5 mg Oral BID    cefTRIAXone (ROCEPHIN) IV  1,000 mg IntraVENous Q24H    apixaban  5 mg Oral BID    sodium chloride flush  5-40 mL IntraVENous 2 times per day    pantoprazole (PROTONIX) 40 mg injection  40 mg IntraVENous Daily    atorvastatin 40 mg Oral Nightly    busPIRone  5 mg Oral TID    citalopram  20 mg Oral Daily    ferrous sulfate  325 mg Oral BID    pramipexole  0.5 mg Oral Nightly     Continuous Infusions:    sodium chloride      sodium chloride 75 mL/hr at 07/13/22 0613    dextrose       PRN Meds: sodium chloride flush, sodium chloride flush, sodium chloride flush, sodium chloride, ondansetron **OR** ondansetron, polyethylene glycol, acetaminophen **OR** acetaminophen, fentanNYL, fentanNYL, oxyCODONE-acetaminophen, melatonin, traZODone, glucose, dextrose bolus **OR** dextrose bolus, glucagon (rDNA), dextrose, ipratropium-albuterol      Lanny Alvarado [address] [place] [address].    Phone [phone]   Fax: [phone]  Answering Service: [phone]

## 2022-07-14 NOTE — PROGRESS NOTES
[facility] - Initial Consult Note  Today's Date and Time: 7/14/2022, 5:30 PM    Impression :   · RLE Erisepylas  · Cholelithiasis. · Leukocytosis  · Elevated CRP  · Recent fall. · DM  · CKD III  · Allergy to Sulfa-Confusion. Recommendations:   · Ceftriaxone 1 gm IV daily x 14 days until 7/25/22. · Follow CBC and renal function closely. · Follow BC. · Supportive care. Medical Decision Making/Summary/Discussion:7/14/2022     · Pen G is ideal therapy. Not a good choice due to Na levels, pt. Decreased renal function,hx of CHF. · Small break in the skin noted to the right lateral calf. Scant amount of serous drainage. · Check blood cultures. Infection Control Recommendations   · [place] Precautions    Antimicrobial Stewardship Recommendations     · Simplification of therapy  · Targeted therapy    Coordination of Outpatient Care:   · Estimated Length of IV antimicrobials:14 days until 7/25/22  · Patient will need Midline Catheter Insertion: Midline placed 7/13/22  · Patient will need PICC line Insertion:BD  · Patient will need: Home IV , [place],  SNF,  LTAC: TBD  · Patient will need outpatient wound care:    Chief complaint/reason for consultation:   · Possible RLE Cellulitis      History of Present Illness:   Adiel Martínez is a 71y.o.-year-old female who was initially admitted on 7/10/2022. Patient seen at the request of     INITIAL HISTORY:  61-year-old female who presented to the ED s/p fall 2 days ago. Patient states she trying to open a box of food and she slipped and fell. She hit her right side on something, she is not sure what. Angel loss of consciousness. Associated symptoms include left hip pain, pain to her right rib cage and right upper abdomen. No associated symptoms of headache, neck or back pain. She is on blood thinners. CT results reviewed as below. Recent hospitalization 6/5-6/14 for BLE cellulitis.   Treated with IV Vancomycin, discharged on Doxycycline x 7 days. Patient presents with BLE redness and swelling. Upon examination patient has erysepilas to the RLE, mid-forefoot to mid-thigh. Light pink in color. No open wounds or areas of skin breaks. Patient states she has had this for about 2-weeks. Denies any pain to the RLE. CURRENT EVALUATION 7/14/2022  /64   Pulse 77   Temp 98.1 °F (36.7 °C) (Oral)   Resp 18   Ht 5' 3\" (1.6 m)   Wt 192 lb 10.9 oz (87.4 kg)   SpO2 95%   BMI 34.13 kg/m²   Feeling much better today. Denies any fever, chills, n/v/d. Small open area to right lateral calf. Scant amount of serous drainage. RLE remains pink, warm, swollen. Slowly improving. WBC normalized. Discharge planning. Labs, X rays reviewed: 7/14/2022    BUN:44>44>48  Cr:1.26>1.25>1.52>1.40>0.79>0.72    WBC:16.0>14.6>11.7>9.5>9.9  Hb:  Plat: 955>217>096    CRP:166.1    Cultures:  Urine:  · 7/11 UA-Negative. Blood:  ·   Sputum :  ·   Wound:     MRSA Nares:       Imaging:  XR HIP 2-3 VW W PELVIS LEFT   Preliminary Result   No acute bony abnormality identified. CT CHEST ABDOMEN PELVIS W CONTRAST   Preliminary Result   No acute traumatic abnormality identified in the chest, abdomen, or pelvis. Subacute mildly displaced proximal sternal body fracture. Age-indeterminate buckle fracture of the right lateral 10th rib. Multiple   old left-sided rib fractures. Distended gallbladder containing a stone at the gallbladder neck. Postsurgical changes in the lower lumbar spine with chronic appearing height   loss of L5.           CT CERVICAL SPINE WO CONTRAST   Final Result   1. Kyphosis of the cervical spine centered at C6-C7. 2. Mild multilevel degenerative changes in the cervical spine primarily at   C5-C6. 3. No acute vertebral body height loss in the cervical spine. 4. Evidence of posterior spinal fusion from C3-C6.        RECOMMENDATIONS:   Unavailable           CT FACIAL BONES WO CONTRAST   Final Result   No acute facial bone trauma. CT HEAD WO CONTRAST   Preliminary Result   No acute intracranial abnormality. 7/11 RUQ US  FINDINGS:   LIVER:  The liver demonstrates normal echogenicity without evidence of   intrahepatic biliary ductal dilatation. There is a paddle pedal flow in the   portal vein. Liver 17 cm in length. BILIARY SYSTEM:  Gallstones in the gallbladder. No wall thickening or   pericholecystic fluid. Common bile duct is within normal limits measuring 5.2 mm. RIGHT KIDNEY: The right kidney is grossly unremarkable without evidence of   hydronephrosis. PANCREAS:  Visualized portions of the pancreas are unremarkable. OTHER: No evidence of right upper quadrant ascites. Impression   Cholelithiasis. No acute findings. RECOMMENDATIONS:   Unavailable                   Discussed with patient, RN. I have personally reviewed the past medical history, past surgical history, medications, social history, and family history, and I have updated the database accordingly. Past Medical History:     Past Medical History:   Diagnosis Date    Allergic rhinitis, cause unspecified     Back pain     lumbar    Bowel obstruction (HCC)     history of due to scar tissue, resolved non-surgically    C. difficile diarrhea     CAD (coronary artery disease)     no stent needed per pt.  Dr. Rae Perry did cath at  2005    Cardiac murmur     Cellulitis     left leg    Cellulitis 2017 August    leg left leg/bug bite    Cerebral artery occlusion with cerebral infarction [facility])     TIA 2014    COVID-19     ONE YR AGO IN 4/25/2020 fever and cough    Diverticulosis of colon (without mention of hemorrhage)     GERD (gastroesophageal reflux disease)     GERD (gastroesophageal reflux disease)     on rx    History of blood transfusion     approx 2020        History of CHF (congestive heart failure)     History of MI (myocardial infarction) 2005 thought due to a blood clot    History of ovarian cyst 1970    had oopherectomy holly    History of peritonitis 1968    due to ruptured appendix age 13    HTN (hypertension)     Hx of blood clots     right leg    Hyperlipidemia     Intestinal or peritoneal adhesions with obstruction (postoperative) (postinfection) ([address]     Kidney infection     renal failure/sepsis/spider bite    Lateral epicondylitis  of elbow     MDRO (multiple drug resistant organisms) resistance     c diff    Muscle strain     right posterior shoulder    Other abnormal glucose     PONV (postoperative nausea and vomiting)     dry heaves    Pre-diabetes     Restless legs syndrome (RLS)     Snores     no cpap    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)     TIA (transient ischemic attack) 2014    Uses walker     Vitamin D deficiency     Wears glasses     Wellness examination     last seen 2 weeks ago       Past Surgical  History:     Past Surgical History:   Procedure Laterality Date    ABDOMEN SURGERY  1976    benign tumor removed near remaining ovary, 1.5 pounds    APPENDECTOMY  1968    appendix ruptured, developed peritonitis    BACK SURGERY      BUNIONECTOMY Left     along with calcium deposits removed   [address]  2005    negative    CERVICAL FUSION  05/21/2021    POSTERIOR C3-6 LAMINECTOMY, PARTIAL C7 LAMINECTOMY, FUSION C3-C6, SILVERCORD    CERVICAL FUSION N/A 5/21/2021    POSTERIOR C3-6 LAMINECTOMY, PARTIAL C7 LAMINECTOMY, FUSION C3-C6, SILVERCORD performed by Jayla Tomlin DO at [address]    12 INCHES REMOVED D/T OBSTRUCTION    COLONOSCOPY      CYST REMOVAL Right     right facial    HYSTERECTOMY (CERVIX STATUS UNKNOWN)  1973    taken as a result of recurring cysts    LUMBAR FUSION N/A 02/10/2020    LUMBAR L4-5 POSTERIOR  DECOMPRESSION INSTRUMENTATION FUSION WCEMENT AUGMENTATION/ performed by Serena Bustamante MD at [place] [address] N/A 2020    L5-S1 PLIF L4-L5 REVISION performed by Vania Good MD at [address]  2014    FESS   [address]    UNILATERAL due to cyst    OVARY REMOVAL      partial, due to cyst    SINUS SURGERY      UPPER GASTROINTESTINAL ENDOSCOPY N/A 2019    EGD ESOPHAGOGASTRODUODENOSCOPY performed by Oscar Garrett MD at . [address] N/A 2019    EGD BIOPSY performed by Shailesh Rahman MD at [facility] N/A 2019    EGD BIOPSY performed by Oscar Garrett MD at [address] [facility] Left 2019    WRIST OPEN REDUCTION INTERNAL FIXATION performed by Chan Anne MD at [facility] AND [facility] OR       Medications:      amLODIPine  5 mg Oral Daily    carvedilol  12.5 mg Oral BID    cefTRIAXone (ROCEPHIN) IV  1,000 mg IntraVENous Q24H    apixaban  5 mg Oral BID    sodium chloride flush  5-40 mL IntraVENous 2 times per day    pantoprazole (PROTONIX) 40 mg injection  40 mg IntraVENous Daily    atorvastatin  40 mg Oral Nightly    busPIRone  5 mg Oral TID    citalopram  20 mg Oral Daily    ferrous sulfate  325 mg Oral BID    pramipexole  0.5 mg Oral Nightly       Social History:     Social History     Socioeconomic History    Marital status:      Spouse name: Not on file    Number of children: Not on file    Years of education: Not on file    Highest education level: Not on file   Occupational History    Occupation: retired   Tobacco Use    Smoking status: Former Smoker     Packs/day: 0.50     Years: 20.00     Pack years: 10.00     Types: Cigarettes     Start date: 1995     Quit date: 2017     Years since quittin.0    Smokeless tobacco: Never Used   Vaping Use    Vaping Use: Never used   Substance and Sexual Activity    Alcohol use: No     Alcohol/week: 0.0 standard drinks    Drug use: No    Sexual activity: Not on file   Other Topics Concern    Not on file   Social History Narrative    Not on file     Social Determinants of Health     Financial Resource Strain:     Difficulty of Paying Living Expenses: Not on file   Food Insecurity:     Worried About [address] in the Last Year: Not on file    Raghavendra of Food in the Last Year: Not on file   Transportation Needs:     Lack of Transportation (Medical): Not on file    Lack of Transportation (Non-Medical): Not on file   Physical Activity:     Days of Exercise per Week: Not on file    Minutes of Exercise per Session: Not on file   Stress:     Feeling of Stress : Not on file   Social Connections:     Frequency of Communication with Friends and Family: Not on file    Frequency of Social Gatherings with Friends and Family: Not on file    Attends Congregational Services: Not on file    Active Member of [address] [place] [address] or Organizations: Not on file    Attends Club or Organization Meetings: Not on file    Marital Status: Not on file   Intimate Partner Violence:     Fear of Current or Ex-Partner: Not on file    Emotionally Abused: Not on file    Physically Abused: Not on file    Sexually Abused: Not on file   Housing Stability:     Unable to Pay for Housing in the Last Year: Not on file    Number of [place] in the Last Year: Not on file    Unstable Housing in the Last Year: Not on file       Family History:     Family History   Problem Relation Age of Onset    Stroke Mother     Diabetes Mother     Heart Disease Mother     High Blood Pressure Mother     Heart Disease Father     Heart Disease Brother     High Blood Pressure Brother     Heart Disease Maternal Grandmother     High Blood Pressure Sister         Allergies:   Bactrim [sulfamethoxazole-trimethoprim], Codeine, Diazepam, Meperidine hcl, and Seasonal     Review of Systems:   Constitutional: No fevers or chills. No systemic complaints  Head: No headaches  Eyes: No double vision or blurry vision.  No conjunctival inflammation. R orbit echymotic. ENT: No sore throat or runny nose. Cardiovascular: No chest pain or palpitations. No shortness of breath. No HENDERSON  Lung: No shortness of breath or cough. No sputum production  Abdomen: No nausea, vomiting, diarrhea, or abdominal pain. Elizabeth Fanning No cramps. Genitourinary: No increased urinary frequency, or dysuria. No hematuria. No suprapubic or CVA pain  Musculoskeletal:c/o pain to broken rib. Hematologic: No bleeding or bruising. Neurologic: No headache, weakness, numbness, or tingling. Integument: No rash, no ulcers. 2-week hx of redness to RLE. Psychiatric: No depression. Endocrine: No polyuria, no polydipsia, no polyphagia. Physical Examination :     Patient Vitals for the past 8 hrs:   BP Temp Temp src Pulse Resp SpO2   07/14/22 1439 139/64 98.1 °F (36.7 °C) Oral 77 18 95 %   07/14/22 1221 -- -- -- -- 16 --   07/14/22 1043 (!) 151/79 -- -- 78 -- --     General Appearance: Awake, alert, and in no apparent distress  Head:  Normocephalic, right orbit ecchymotic from fall. Eyes: Pupils equal, round, reactive to light and accommodation; extraocular movements intact; sclera anicteric; conjunctivae pink. ENT: Oropharynx clear, without erythema, exudate, or thrush. No tenderness of sinuses. Mouth/throat: mucosa pink and moist.   Neck:Supple, without lymphadenopathy. No JVD, tracheal deviation. Pulmonary/Chest: Clear to auscultation, without wheezes, rales, or rhonchi. Cardiovascular: Regular rate and rhythm without murmurs, rubs, or gallops. Abdomen: Soft, non tender. Bowel sounds normal.   All four Extremities: No cyanosis, clubbing. 2+edema to RLE. Neurologic: No gross sensory or motor deficits. Skin: Warm and dry with good turgor. RLE pink, warm from mid-forefoot to mid-thigh. Small skin break to right lateral calf. No induration.     Medical Decision Making -Laboratory:   I have independently reviewed/ordered the following labs:    CBC with Differential: Recent Labs     07/13/22  0535 07/14/22  0601   WBC 9.5 9.9   HGB 11.0* 11.2*   HCT 32.5* 34.6*    210   LYMPHOPCT 12* 12*   MONOPCT 6 7     BMP:   Recent Labs     07/13/22  0707 07/14/22  0601    142   K 3.5* 3.8   * 111*   CO2 24 21   BUN 28* 18   CREATININE 0.79 0.72     Hepatic Function Panel:   No results for input(s): PROT, LABALBU, BILIDIR, IBILI, BILITOT, ALKPHOS, ALT, AST in the last 72 hours. No results for input(s): RPR in the last 72 hours. No results for input(s): HIV in the last 72 hours. No results for input(s): BC in the last 72 hours. Lab Results   Component Value Date/Time    MUCUS NOT REPORTED 08/21/2019 10:00 PM    RBC 3.58 07/14/2022 06:01 AM    TRICHOMONAS NOT REPORTED 08/21/2019 10:00 PM    WBC 9.9 07/14/2022 06:01 AM    YEAST NOT REPORTED 08/21/2019 10:00 PM    TURBIDITY Clear 07/11/2022 06:13 PM     Lab Results   Component Value Date/Time    CREATININE 0.72 07/14/2022 06:01 AM    GLUCOSE 99 07/14/2022 06:01 AM       Medical Decision Making-Imaging:       Medical Decision Dnfqyx-Oqzcqorc-Netom:       Medical Decision Making-Other:     Note:  · Labs, medications, radiologic studies were reviewed with personal review of films  · Large amounts of data were reviewed  · Discussed with nursing Staff, Discharge planner  · Infection Control and Prevention measures reviewed  · All prior entries were reviewed  · Administer medications as ordered  · Prognosis: Good  · Discharge planning reviewed  · Follow up as outpatient. Thank you for allowing us to participate in the care of this patient. Please call with questions.     Hope Phillips, MAIK - CNP    Perfect Serve/Office: [phone]

## 2022-07-14 NOTE — PROGRESS NOTES
Occupational Therapy  Facility/Department: [facility] MED SURG  Daily Treatment Note  NAME: Yamileth Guthrie  : 1950  MRN: [id number]    Date of Service: 2022    Discharge Recommendations:  Patient would benefit from continued therapy after discharge  OT Equipment Recommendations  Other: TBD      Patient Diagnosis(es): The primary encounter diagnosis was Closed fracture of body of sternum, initial encounter. Diagnoses of Closed fracture of one rib of right side, initial encounter and Contusion of face, initial encounter were also pertinent to this visit. Assessment    Activity Tolerance: Patient limited by pain; Patient limited by endurance  Discharge Recommendations: Patient would benefit from continued therapy after discharge  Other: TBD      Plan   Plan  Times per Week: 4-5  Current Treatment Recommendations: Strengthening;ROM;Endurance training;Functional mobility training;Balance training; Safety education & training;Pain management;Patient/Caregiver education & training;Equipment evaluation, education, & procurement;Positioning;Self-Care / ADL     Restrictions       Subjective   Orientation  Overall Orientation Status: Within Functional Limits  Orientation Level: Oriented X4  Cognition  Overall Cognitive Status: WFL        Objective    Vitals  Vitals  BP Location: Left upper arm  O2 Device: None (Room air)  Bed Mobility Training  Bed Mobility Training: Yes  Interventions: Verbal cues (VCs for log rolling tech for pain mgt)  Rolling: Stand-by assistance  Supine to Sit: Stand-by assistance  Sit to Supine: Stand-by assistance  Scooting: Stand-by assistance  Duration:  (tolerates sitting EOB x ~6 minutes)  Balance  Sitting: Without support       ADL  Feeding: Setup  Grooming: Setup  UE Bathing: Contact guard assistance  LE Bathing: Maximum assistance  UE Dressing: Contact guard assistance  LE Dressing: Maximum assistance  Toileting: Maximum assistance  Additional Comments: ADL scores based on skilled observation and clinical reasoning, unless otherwise noted. Pt is currently limited by significant pain and limited mobility, impacting independence with self care. pt educates on AE to increase safety, independence and safety during ADLs. ie reacher, sock aid, LH sponge, GB placement. pt states that she has toilet riser with raills              Patient Education  Education Given To: Patient  Education Provided: Role of Therapy;Plan of Care;Precautions; ADL Adaptive Strategies; Equipment; Fall Prevention Strategies  Education Method: Verbal  Barriers to Learning: None  Education Outcome: Continued education needed    Goals  Short Term Goals  Time Frame for Short term goals: By discharge  Short Term Goal 1: Pt will perform UB self care with supervision and Good safety  Short Term Goal 2: Pt will perform LB self care with SBA, using appropriate adaptive equipment/adaptive strategies, and Good safety  Short Term Goal 3: Pt will tolerate standing for 3-5 minutes during 1-2 handed functional tasks to improve independence and safety with standing ADLs/IADLs.    Short Term Goal 4: Pt will perform functional transfers/mobility with SBA, using least restrictive device, during self care  Short Term Goal 5: Pt will actively participate in 15+ minutes of therapeutic exercise/functional activity to promote safety and independence with self care and mobility       Therapy Time   Individual Concurrent Group Co-treatment   Time In 1510         Time Out 1535         Minutes SHWETA Handley

## 2022-07-14 NOTE — PROGRESS NOTES
Physical Therapy  Facility/Department: [facility] SURG  Daily Treatment Note  NAME: Rossana Cazares  : 1950  MRN: [id number]    Date of Service: 2022    Discharge Recommendations:  Patient would benefit from continued therapy after discharge,Therapy recommended at discharge        Patient Diagnosis(es): The primary encounter diagnosis was Closed fracture of body of sternum, initial encounter. Diagnoses of Closed fracture of one rib of right side, initial encounter and Contusion of face, initial encounter were also pertinent to this visit. Assessment   Activity Tolerance: Patient limited by pain; Patient limited by endurance     Plan    Plan  Plan: 6-7 times per week  Specific Instructions for Next Treatment: BLE strengthening with focus on hip flexors and core; Focused body mechanics training during descent to chair; Progress ambulation distance  Current Treatment Recommendations: Strengthening;ROM;Balance training;Functional mobility training;Transfer training; Endurance training;Gait training;Pain management; Safety education & training; Therapeutic activities     Restrictions  Restrictions/Precautions  Restrictions/Precautions: Fall Risk,General Precautions  Required Braces or Orthoses?: No  Implants present? : Metal implants (Spinal Fusion Hardware)  Position Activity Restriction  Other position/activity restrictions: subacute sternum fracture; avoid excessive pushing/pulling/lifting     Subjective    Subjective  Subjective: Pt is in bed asleep upon arrival however easily startled awake. Pt quick to start to get up. Had to slow patient down as not to set off the bed alarm.   Pain: 9/10  Orientation  Overall Orientation Status: Within Functional Limits  Orientation Level: Oriented X4  Cognition  Overall Cognitive Status: WFL     Objective   Vitals     Bed Mobility Training  Bed Mobility Training: Yes  Interventions: Verbal cues (VCs for log rolling tech for pain mgt)  Rolling: Stand-by assistance  Supine to Sit: Stand-by assistance  Sit to Supine: Stand-by assistance  Scooting: Stand-by assistance  Duration:  (tolerates sitting EOB x ~6 minutes)  Balance  Sitting: Without support  Sitting - Static: Fair (occasional)  Sitting - Dynamic: Poor (constant support)  Standing: With support  Standing - Static: Poor  Standing - Dynamic: Poor  Transfer Training  Transfer Training: Yes  Interventions: Manual cues; Safety awareness training; Tactile cues; Verbal cues  Sit to Stand: Minimum assistance  Stand to Sit: Minimum assistance  Stand Pivot Transfers: Minimum assistance  Bed to Chair: Minimum assistance; Additional time; Adaptive equipment;Setup;Assist X1  Gait Training  Gait Training: Yes  Gait  Overall Level of Assistance: Minimum assistance;Assist X1;Additional time; Adaptive equipment  Interventions: Verbal cues; Safety awareness training (To keep RW within KAREEM)  Speed/Alicia: Slow  Step Length: Left lengthened;Right shortened  Swing Pattern: Right asymmetrical  Stance: Left decreased;Right increased  Gait Abnormalities: Antalgic;Decreased step clearance; Path deviations; Step to gait  Distance (ft): 15 Feet  Assistive Device: Walker, rolling     PT Exercises  A/AROM Exercises: Seated EOB bilat LE exercises, [id number]             Goals  Short Term Goals  Short term goal 1: pt to demo all transfers with RW and SBA  Short term goal 2: pt to ambulate 48' with RW and SBA   Short term goal 3: pt to demo improved BLE strength by 1/2 MMG to improve safety and ease with mobility  Short term goal 4: pt to perform all bed mobility with CGA-MinAx1 to decrease burden of care  Patient Goals   Patient goals : pt does not verbalize goals    Education  Patient Education  Education Given To: Patient  Education Provided: Role of Therapy;Plan of Care;Transfer Training;Equipment; Fall Prevention Strategies  Education Provided Comments: pt encouraged to NOT use purewick during day-time hours to promote inc frequency of mobility and to improve endurance  Education Method: Verbal  Barriers to Learning: None  Education Outcome: Verbalized understanding; Unable to demonstrate understanding;Continued education needed    Therapy Time   Individual Concurrent Group Co-treatment   Time In 1058         Time Out 1130         Minutes [address] [facility]

## 2022-07-14 NOTE — PROGRESS NOTES
Reviewed patient's chart. She will likely benefit from acute inpatient rehabilitation once medically stable per primary team.  Per notes, precert has been started for [facility].       Khushi Levine MD

## 2022-07-14 NOTE — PROGRESS NOTES
Patient was seen and examined. Resting comfortably. Afebrile vital signs are stable. No acute shortness of breath. I don't extend his cellulitis is significantly improved. Vascular input noted. Blood work reviewed. WBC count normal at 9.5. Hemoglobin 11. Platelet count adequate. Potassium 3.5. Creatinine is normal.    Continue medical management. Nothing further to add from general surgery standpoint. I will sign off the case. Please call me as needed.

## 2022-07-14 NOTE — PROGRESS NOTES
[place] [address] [facility]    Progress Note     7/14/2022    3:14 PM    Name:   Adiel Martínez  MRN:     [id number]     Acct:      [de-identified]   Room:   2071/2071-01   Day:  0  Admit Date:  7/10/2022 12:48 PM    PCP:   Reg Jeronimo MD  Code Status:  Full Code    Subjective:     C/C:   Chief Complaint   Patient presents with   Bryan Alexey    Head Injury    Shortness of Breath    Hip Pain     left     Principal Problem:    Fracture of body of sternum, initial encounter for open fracture  Active Problems:    Nondisplaced fracture of sternal end of left clavicle, initial encounter for closed fracture    Erysipelas of right lower extremity    Closed fracture of body of sternum    Calculus of gallbladder without cholecystitis without obstruction    Leukocytosis    Type 2 diabetes mellitus with stage 3 chronic kidney disease ([facility]    Allergy to sulfa drugs    Deep vein thrombosis (DVT) of right lower extremity ([facility] [address]  Resolved Problems:    * No resolved hospital problems.  *      reports feeling fine  Has been afebrile  HR stable  Blood pressure labile    Labs -   Wbc 9.9  Hb 11.2  Cr 0.72    PM & R recommends in patient rehab, in process             Recent Results (from the past 24 hour(s))   CBC with Auto Differential    Collection Time: 07/14/22  6:01 AM   Result Value Ref Range    WBC 9.9 3.5 - 11.0 k/uL    RBC 3.58 (L) 4.0 - 5.2 m/uL    Hemoglobin 11.2 (L) 12.0 - 16.0 g/dL    Hematocrit 34.6 (L) 36 - 46 %    MCV 96.7 80 - 100 fL    MCH 31.3 26 - 34 pg    MCHC 32.4 31 - 37 g/dL    RDW 14.1 11.5 - 14.9 %    Platelets 828 969 - 219 k/uL    MPV 7.3 6.0 - 12.0 fL    Seg Neutrophils 78 (H) 36 - 66 %    Lymphocytes 12 (L) 24 - 44 %    Monocytes 7 1 - 7 %    Eosinophils % 3 0 - 4 %    Basophils 0 0 - 2 %    Segs Absolute 7.80 1.3 - 9.1 k/uL    Absolute Lymph # 1.10 1.0 - 4.8 k/uL    Absolute Mono # 0.70 0.1 - 1.3 k/uL    Absolute Eos # 0.30 0.0 - 0.4 k/uL    Basophils Absolute 0.00 0.0 - 0.2 k/uL   Basic Metabolic Panel    Collection Time: 07/14/22  6:01 AM   Result Value Ref Range    Glucose 99 70 - 99 mg/dL    BUN 18 8 - 23 mg/dL    CREATININE 0.72 0.50 - 0.90 mg/dL    Calcium 8.4 (L) 8.6 - 10.4 mg/dL    Sodium 142 135 - 144 mmol/L    Potassium 3.8 3.7 - 5.3 mmol/L    Chloride 111 (H) 98 - 107 mmol/L    CO2 21 20 - 31 mmol/L    Anion Gap 10 9 - 17 mmol/L    GFR Non-African American >60 >60 mL/min    GFR African American >60 >60 mL/min    GFR Comment           Recent Labs     07/12/22  1131 07/12/22  1639 07/13/22  0719 07/13/22  1114   POCGLU 142* 116* 105 154*        No results found. On admission     52-year-old female presenting with a fall which happened 2 days ago  Resulted in head injury to right side of head patient is on blood thinners-CT head negative in ER  Complaining of left hip pain at presentation unclear onset     Imaging in ER also showed right lateral 10th rib fracture, subacute sternal body fracture, multiple old left-sided rib fractures     Medical history includes CAD, CHF, GERD, hypertension, hyperlipidemia, prediabetes, history of recurrent DVT       Review of Systems:     As recorded in HPI          Physical Examination:        Vitals:    07/14/22 0616 07/14/22 1043 07/14/22 1221 07/14/22 1439   BP: (!) 152/62 (!) 151/79  139/64   Pulse: 82 78  77   Resp: 18  16 18   Temp: 98.8 °F (37.1 °C)   98.1 °F (36.7 °C)   TempSrc:    Oral   SpO2: 96%   95%   Weight:       Height:           Recent Labs     07/12/22  1131 07/12/22  1639 07/13/22  0719 07/13/22  1114   POCGLU 142* 116* 105 154*       Intake/Output Summary (Last 24 hours) at 7/14/2022 1514  Last data filed at 7/13/2022 1524  Gross per 24 hour   Intake --   Output 50 ml   Net -50 ml       General Appearance:  alert, well appearing, and in no acute distress  Mental status:   Head:  normocephalic, atraumatic.   Eye: no icterus, redness, pupils equal and reactive, extraocular eye movements intact, conjunctiva clear  Ear: normal external ear, no discharge, hearing intact  Nose:  no drainage noted  Mouth: mucous membranes moist  Neck: supple, no carotid bruits, thyroid not palpable  Lungs: Bilateral equal air entry, clear to ausculation, no wheezing, rales or rhonchi, normal effort  Cardiovascular: normal rate, regular rhythm, no murmur, gallop, rub. Abdomen: Soft, nontender, nondistended, normal bowel sounds, no hepatomegaly or splenomegaly  Neurologic: There are no new focal motor or sensory deficits,   Skin: No gross lesions, rashes, bruising or bleeding on exposed skin area  Extremities:  Redness and swelling of RLE  Psych:             Data:     PLabs:    BMP:   Recent Labs     07/13/22  0707 07/14/22  0601    142   K 3.5* 3.8   CO2 24 21   BUN 28* 18   CREATININE 0.79 0.72   LABGLOM >60 >60   GLUCOSE 106* 99                 Medications: Allergies:     Allergies   Allergen Reactions    Bactrim [Sulfamethoxazole-Trimethoprim] Other (See Comments)     confusion    Codeine Itching    Diazepam Other (See Comments)    Meperidine Hcl Other (See Comments)    Seasonal        Current Meds:   Scheduled Meds:    amLODIPine  5 mg Oral Daily    carvedilol  12.5 mg Oral BID    cefTRIAXone (ROCEPHIN) IV  1,000 mg IntraVENous Q24H    apixaban  5 mg Oral BID    sodium chloride flush  5-40 mL IntraVENous 2 times per day    pantoprazole (PROTONIX) 40 mg injection  40 mg IntraVENous Daily    atorvastatin  40 mg Oral Nightly    busPIRone  5 mg Oral TID    citalopram  20 mg Oral Daily    ferrous sulfate  325 mg Oral BID    pramipexole  0.5 mg Oral Nightly     Continuous Infusions:    sodium chloride      dextrose       PRN Meds: sodium chloride flush, sodium chloride flush, sodium chloride flush, sodium chloride, ondansetron **OR** ondansetron, polyethylene glycol, acetaminophen **OR** acetaminophen, fentanNYL, fentanNYL, oxyCODONE-acetaminophen, melatonin, traZODone, glucose, dextrose bolus **OR** dextrose bolus, glucagon (rDNA), dextrose, ipratropium-albuterol          Assessment:        Primary Problem  Fracture of body of sternum, initial encounter for open fracture    Principal Problem:    Fracture of body of sternum, initial encounter for open fracture  Active Problems:    Nondisplaced fracture of sternal end of left clavicle, initial encounter for closed fracture    Erysipelas of right lower extremity    Closed fracture of body of sternum    Calculus of gallbladder without cholecystitis without obstruction    Leukocytosis    Type 2 diabetes mellitus with stage 3 chronic kidney disease (HCC)    Allergy to sulfa drugs    Deep vein thrombosis (DVT) of right lower extremity ([address]  Resolved Problems:    * No resolved hospital problems. *       Plan:          7/14/22    Fracture of the sternum - supportive care, pain control     cellulitis of the RLE - on rocephin 1 g daily, ID onboard, negative venous dopplers  Vascular - no intervention required  general surgery recommends medical management     BIN - resolved, discontinue IVF    Hypertension -   Resume Norvasc and coreg     CHFpEF - continue lipitor     Recurrent DVT - on eliquis 5 mg BID     Abdominal pain - cholelithiasis without cholecystitis     Hypokalemia - replace as needed      .   Hospital Problems             Last Modified POA    * (Principal) Fracture of body of sternum, initial encounter for open fracture 7/10/2022 Yes    Nondisplaced fracture of sternal end of left clavicle, initial encounter for closed fracture 7/10/2022 Yes    Erysipelas of right lower extremity 7/11/2022 Yes    Closed fracture of body of sternum 7/11/2022 Yes    Calculus of gallbladder without cholecystitis without obstruction 7/11/2022 Yes    Leukocytosis 7/11/2022 Yes    Type 2 diabetes mellitus with stage 3 chronic kidney disease ([address] 7/11/2022 Yes    Allergy to sulfa drugs 7/11/2022 Yes    Deep vein thrombosis (DVT) of right lower extremity ([address] 7/11/2022 Yes Thanks for consulting us . Will monitor vitals and clinical course , and  Optimize therapy  as needed . Ramiro Metzger MD  7/14/2022     Attestation and add on       I have discussed the care of Wendi Graves , including pertinent history and exam findings,      7/14/22    with the resident. I have seen and examined the patient and the key elements of all parts of the encounter have been performed by me . I agree with the assessment, plan and orders as documented by the resident. Hospital Problems             Last Modified POA    * (Principal) Fracture of body of sternum, initial encounter for open fracture 7/10/2022 Yes    Nondisplaced fracture of sternal end of left clavicle, initial encounter for closed fracture 7/10/2022 Yes    Erysipelas of right lower extremity 7/11/2022 Yes    Closed fracture of body of sternum 7/11/2022 Yes    Calculus of gallbladder without cholecystitis without obstruction 7/11/2022 Yes    Leukocytosis 7/11/2022 Yes    Type 2 diabetes mellitus with stage 3 chronic kidney disease ([facility] 7/11/2022 Yes    Allergy to sulfa drugs 7/11/2022 Yes    Deep vein thrombosis (DVT) of right lower extremity ([facility] 7/11/2022 Yes          ''''''''''       MD GENA Benavides [address], [address] [place] [address].    Phone [phone]   Fax: [phone]  Answering Service: [phone]

## 2022-07-14 NOTE — PLAN OF CARE
patient assessment  7/14/2022 0605 by Gagan Alcazar RN  Outcome: Progressing  Flowsheets (Taken 7/14/2022 0209)  Absence of Physical Injury: Implement safety measures based on patient assessment     Problem: Chronic Conditions and Co-morbidities  Goal: Patient's chronic conditions and co-morbidity symptoms are monitored and maintained or improved  7/14/2022 1743 by Mychal Conley RN  Outcome: Progressing  Flowsheets (Taken 7/14/2022 0835)  Care Plan - Patient's Chronic Conditions and Co-Morbidity Symptoms are Monitored and Maintained or Improved: Monitor and assess patient's chronic conditions and comorbid symptoms for stability, deterioration, or improvement  7/14/2022 0605 by Gagan Alcazar RN  Outcome: Progressing  Flowsheets (Taken 7/13/2022 2040)  Care Plan - Patient's Chronic Conditions and Co-Morbidity Symptoms are Monitored and Maintained or Improved: Monitor and assess patient's chronic conditions and comorbid symptoms for stability, deterioration, or improvement

## 2022-07-14 NOTE — CARE COORDINATION
AMELIA spoke with Alfie Graham at [facility] to check on status of the Pre-cert. AMELIA was informed that it is still pending .      Electronically signed by Jaden Bhakta on 7/14/22 at 3:32 PM EDT

## 2022-07-14 NOTE — PLAN OF CARE
Problem: Discharge Planning  Goal: Discharge to home or other facility with appropriate resources  7/14/2022 0605 by Scott William RN  Outcome: Progressing  Flowsheets (Taken 7/13/2022 2040)  Discharge to home or other facility with appropriate resources: Identify barriers to discharge with patient and caregiver  7/13/2022 1720 by Marsha Garcia RN  Outcome: Progressing  Flowsheets  Taken 7/13/2022 1700  Discharge to home or other facility with appropriate resources:   Identify barriers to discharge with patient and caregiver   Identify discharge learning needs (meds, wound care, etc)  Taken 7/13/2022 1655  Discharge to home or other facility with appropriate resources:   Identify barriers to discharge with patient and caregiver   Identify discharge learning needs (meds, wound care, etc)     Problem: Pain  Goal: Verbalizes/displays adequate comfort level or baseline comfort level  7/14/2022 0605 by Scott William RN  Outcome: Progressing  Flowsheets (Taken 7/13/2022 2150)  Verbalizes/displays adequate comfort level or baseline comfort level:   Encourage patient to monitor pain and request assistance   Assess pain using appropriate pain scale   Administer analgesics based on type and severity of pain and evaluate response  7/13/2022 1720 by Marsha Garcia RN  Outcome: Progressing     Problem: Safety - Adult  Goal: Free from fall injury  7/14/2022 0605 by Scott William RN  Outcome: Progressing  Flowsheets (Taken 7/14/2022 0209)  Free From Fall Injury: Based on caregiver fall risk screen, instruct family/caregiver to ask for assistance with transferring infant if caregiver noted to have fall risk factors  7/13/2022 1720 by Marsha Garcia RN  Outcome: Progressing  Flowsheets (Taken 7/13/2022 1713)  Free From Fall Injury: Instruct family/caregiver on patient safety     Problem: ABCDS Injury Assessment  Goal: Absence of physical injury  7/14/2022 0605 by Scott William RN  Outcome: Progressing  Flowsheets (Taken 7/14/2022 0209)  Absence of Physical Injury: Implement safety measures based on patient assessment  7/13/2022 1720 by Nereyda Gibson RN  Outcome: Progressing  Flowsheets (Taken 7/13/2022 1713)  Absence of Physical Injury: Implement safety measures based on patient assessment     Problem: Chronic Conditions and Co-morbidities  Goal: Patient's chronic conditions and co-morbidity symptoms are monitored and maintained or improved  7/14/2022 0605 by Olga Yap RN  Outcome: Progressing  Flowsheets (Taken 7/13/2022 2040)  Care Plan - Patient's Chronic Conditions and Co-Morbidity Symptoms are Monitored and Maintained or Improved: Monitor and assess patient's chronic conditions and comorbid symptoms for stability, deterioration, or improvement  7/13/2022 1720 by Nereyda Gibson RN  Outcome: Progressing  Flowsheets (Taken 7/13/2022 1655)  Care Plan - Patient's Chronic Conditions and Co-Morbidity Symptoms are Monitored and Maintained or Improved: Monitor and assess patient's chronic conditions and comorbid symptoms for stability, deterioration, or improvement no concerns

## 2022-07-15 LAB
ABSOLUTE EOS #: 0.6 K/UL (ref 0–0.4)
ABSOLUTE LYMPH #: 1.4 K/UL (ref 1–4.8)
ABSOLUTE MONO #: 0.9 K/UL (ref 0.1–1.3)
ANION GAP SERPL CALCULATED.3IONS-SCNC: 9 MMOL/L (ref 9–17)
BASOPHILS # BLD: 1 % (ref 0–2)
BASOPHILS ABSOLUTE: 0 K/UL (ref 0–0.2)
BUN BLDV-MCNC: 17 MG/DL (ref 8–23)
CALCIUM SERPL-MCNC: 8.1 MG/DL (ref 8.6–10.4)
CHLORIDE BLD-SCNC: 108 MMOL/L (ref 98–107)
CO2: 23 MMOL/L (ref 20–31)
CREAT SERPL-MCNC: 0.73 MG/DL (ref 0.5–0.9)
EOSINOPHILS RELATIVE PERCENT: 7 % (ref 0–4)
GFR AFRICAN AMERICAN: >60 ML/MIN
GFR NON-AFRICAN AMERICAN: >60 ML/MIN
GFR SERPL CREATININE-BSD FRML MDRD: ABNORMAL ML/MIN/{1.73_M2}
GLUCOSE BLD-MCNC: 101 MG/DL (ref 70–99)
GLUCOSE BLD-MCNC: 108 MG/DL (ref 65–105)
GLUCOSE BLD-MCNC: 119 MG/DL (ref 65–105)
GLUCOSE BLD-MCNC: 125 MG/DL (ref 65–105)
GLUCOSE BLD-MCNC: 129 MG/DL (ref 65–105)
GLUCOSE BLD-MCNC: 156 MG/DL (ref 65–105)
GLUCOSE BLD-MCNC: 158 MG/DL (ref 65–105)
GLUCOSE BLD-MCNC: 99 MG/DL (ref 65–105)
HCT VFR BLD CALC: 30.3 % (ref 36–46)
HEMOGLOBIN: 10.1 G/DL (ref 12–16)
LYMPHOCYTES # BLD: 16 % (ref 24–44)
MCH RBC QN AUTO: 31.5 PG (ref 26–34)
MCHC RBC AUTO-ENTMCNC: 33.4 G/DL (ref 31–37)
MCV RBC AUTO: 94.3 FL (ref 80–100)
MONOCYTES # BLD: 10 % (ref 1–7)
PDW BLD-RTO: 13.8 % (ref 11.5–14.9)
PLATELET # BLD: 238 K/UL (ref 150–450)
PMV BLD AUTO: 7.9 FL (ref 6–12)
POTASSIUM SERPL-SCNC: 4 MMOL/L (ref 3.7–5.3)
RBC # BLD: 3.22 M/UL (ref 4–5.2)
SEG NEUTROPHILS: 66 % (ref 36–66)
SEGMENTED NEUTROPHILS ABSOLUTE COUNT: 5.7 K/UL (ref 1.3–9.1)
SODIUM BLD-SCNC: 140 MMOL/L (ref 135–144)
WBC # BLD: 8.6 K/UL (ref 3.5–11)

## 2022-07-15 PROCEDURE — 97116 GAIT TRAINING THERAPY: CPT

## 2022-07-15 PROCEDURE — A4216 STERILE WATER/SALINE, 10 ML: HCPCS | Performed by: SURGERY

## 2022-07-15 PROCEDURE — 6370000000 HC RX 637 (ALT 250 FOR IP)

## 2022-07-15 PROCEDURE — 6360000002 HC RX W HCPCS: Performed by: SURGERY

## 2022-07-15 PROCEDURE — 82947 ASSAY GLUCOSE BLOOD QUANT: CPT

## 2022-07-15 PROCEDURE — 36415 COLL VENOUS BLD VENIPUNCTURE: CPT

## 2022-07-15 PROCEDURE — 97110 THERAPEUTIC EXERCISES: CPT

## 2022-07-15 PROCEDURE — 6360000002 HC RX W HCPCS: Performed by: NURSE PRACTITIONER

## 2022-07-15 PROCEDURE — 2580000003 HC RX 258: Performed by: NURSE PRACTITIONER

## 2022-07-15 PROCEDURE — 96366 THER/PROPH/DIAG IV INF ADDON: CPT

## 2022-07-15 PROCEDURE — G0378 HOSPITAL OBSERVATION PER HR: HCPCS

## 2022-07-15 PROCEDURE — 80048 BASIC METABOLIC PNL TOTAL CA: CPT

## 2022-07-15 PROCEDURE — 2580000003 HC RX 258: Performed by: SURGERY

## 2022-07-15 PROCEDURE — 99232 SBSQ HOSP IP/OBS MODERATE 35: CPT | Performed by: NURSE PRACTITIONER

## 2022-07-15 PROCEDURE — C9113 INJ PANTOPRAZOLE SODIUM, VIA: HCPCS | Performed by: SURGERY

## 2022-07-15 PROCEDURE — 6370000000 HC RX 637 (ALT 250 FOR IP): Performed by: SURGERY

## 2022-07-15 PROCEDURE — 6370000000 HC RX 637 (ALT 250 FOR IP): Performed by: INTERNAL MEDICINE

## 2022-07-15 PROCEDURE — 99232 SBSQ HOSP IP/OBS MODERATE 35: CPT | Performed by: INTERNAL MEDICINE

## 2022-07-15 PROCEDURE — 6370000000 HC RX 637 (ALT 250 FOR IP): Performed by: NURSE PRACTITIONER

## 2022-07-15 PROCEDURE — 97535 SELF CARE MNGMENT TRAINING: CPT

## 2022-07-15 PROCEDURE — 85025 COMPLETE CBC W/AUTO DIFF WBC: CPT

## 2022-07-15 PROCEDURE — 96376 TX/PRO/DX INJ SAME DRUG ADON: CPT

## 2022-07-15 RX ORDER — PANTOPRAZOLE SODIUM 40 MG/1
40 TABLET, DELAYED RELEASE ORAL
Status: DISCONTINUED | OUTPATIENT
Start: 2022-07-16 | End: 2022-07-21 | Stop reason: HOSPADM

## 2022-07-15 RX ADMIN — SODIUM CHLORIDE, PRESERVATIVE FREE 10 ML: 5 INJECTION INTRAVENOUS at 19:54

## 2022-07-15 RX ADMIN — CITALOPRAM HYDROBROMIDE 20 MG: 20 TABLET ORAL at 09:00

## 2022-07-15 RX ADMIN — BUSPIRONE HYDROCHLORIDE 5 MG: 5 TABLET ORAL at 19:52

## 2022-07-15 RX ADMIN — FERROUS SULFATE TAB 325 MG (65 MG ELEMENTAL FE) 325 MG: 325 (65 FE) TAB at 08:46

## 2022-07-15 RX ADMIN — FERROUS SULFATE TAB 325 MG (65 MG ELEMENTAL FE) 325 MG: 325 (65 FE) TAB at 19:52

## 2022-07-15 RX ADMIN — BUSPIRONE HYDROCHLORIDE 5 MG: 5 TABLET ORAL at 08:46

## 2022-07-15 RX ADMIN — APIXABAN 5 MG: 5 TABLET, FILM COATED ORAL at 19:52

## 2022-07-15 RX ADMIN — PRAMIPEXOLE DIHYDROCHLORIDE 0.5 MG: 0.25 TABLET ORAL at 19:52

## 2022-07-15 RX ADMIN — AMLODIPINE BESYLATE 5 MG: 5 TABLET ORAL at 12:59

## 2022-07-15 RX ADMIN — OXYCODONE HYDROCHLORIDE AND ACETAMINOPHEN 1 TABLET: 5; 325 TABLET ORAL at 04:08

## 2022-07-15 RX ADMIN — APIXABAN 5 MG: 5 TABLET, FILM COATED ORAL at 08:47

## 2022-07-15 RX ADMIN — ATORVASTATIN CALCIUM 40 MG: 40 TABLET, FILM COATED ORAL at 19:52

## 2022-07-15 RX ADMIN — CEFTRIAXONE SODIUM 1000 MG: 1 INJECTION, POWDER, FOR SOLUTION INTRAMUSCULAR; INTRAVENOUS at 14:25

## 2022-07-15 RX ADMIN — OXYCODONE HYDROCHLORIDE AND ACETAMINOPHEN 1 TABLET: 5; 325 TABLET ORAL at 18:26

## 2022-07-15 RX ADMIN — SODIUM CHLORIDE, PRESERVATIVE FREE 10 ML: 5 INJECTION INTRAVENOUS at 09:00

## 2022-07-15 RX ADMIN — OXYCODONE HYDROCHLORIDE AND ACETAMINOPHEN 1 TABLET: 5; 325 TABLET ORAL at 08:47

## 2022-07-15 RX ADMIN — PANTOPRAZOLE SODIUM 40 MG: 40 INJECTION, POWDER, FOR SOLUTION INTRAVENOUS at 09:00

## 2022-07-15 RX ADMIN — CARVEDILOL 12.5 MG: 12.5 TABLET, FILM COATED ORAL at 12:59

## 2022-07-15 RX ADMIN — BUSPIRONE HYDROCHLORIDE 5 MG: 5 TABLET ORAL at 14:24

## 2022-07-15 RX ADMIN — CARVEDILOL 12.5 MG: 12.5 TABLET, FILM COATED ORAL at 19:52

## 2022-07-15 ASSESSMENT — PAIN - FUNCTIONAL ASSESSMENT
PAIN_FUNCTIONAL_ASSESSMENT: PREVENTS OR INTERFERES WITH MANY ACTIVE NOT PASSIVE ACTIVITIES
PAIN_FUNCTIONAL_ASSESSMENT: PREVENTS OR INTERFERES SOME ACTIVE ACTIVITIES AND ADLS
PAIN_FUNCTIONAL_ASSESSMENT: PREVENTS OR INTERFERES SOME ACTIVE ACTIVITIES AND ADLS

## 2022-07-15 ASSESSMENT — PAIN DESCRIPTION - ORIENTATION
ORIENTATION: RIGHT

## 2022-07-15 ASSESSMENT — PAIN SCALES - GENERAL
PAINLEVEL_OUTOF10: 10
PAINLEVEL_OUTOF10: 7
PAINLEVEL_OUTOF10: 8
PAINLEVEL_OUTOF10: 9
PAINLEVEL_OUTOF10: 8

## 2022-07-15 ASSESSMENT — PAIN DESCRIPTION - DESCRIPTORS
DESCRIPTORS: ACHING
DESCRIPTORS: SHARP

## 2022-07-15 ASSESSMENT — PAIN DESCRIPTION - LOCATION
LOCATION: RIB CAGE
LOCATION: RIB CAGE
LOCATION: CHEST
LOCATION: HIP;LEG
LOCATION: ABDOMEN

## 2022-07-15 NOTE — PLAN OF CARE
Problem: Discharge Planning  Goal: Discharge to home or other facility with appropriate resources  7/15/2022 0326 by Tania Molina RN  Outcome: Progressing  7/14/2022 1743 by Lilia Farley RN  Outcome: Progressing  Flowsheets (Taken 7/14/2022 0731)  Discharge to home or other facility with appropriate resources:   Identify barriers to discharge with patient and caregiver   Identify discharge learning needs (meds, wound care, etc)     Problem: Pain  Goal: Verbalizes/displays adequate comfort level or baseline comfort level  7/15/2022 0326 by Tania Molina RN  Outcome: Progressing  Note: Pain was assessed during hourly rounding. Pain medication was given per order to patient satisfaction. See MAR for details.    7/14/2022 1743 by Lilia Farley RN  Outcome: Progressing     Problem: Safety - Adult  Goal: Free from fall injury  7/15/2022 0326 by Tania Molina RN  Outcome: Progressing  7/14/2022 1743 by Lilia Farley RN  Outcome: Progressing  Flowsheets (Taken 7/14/2022 1317)  Free From Fall Injury: Based on caregiver fall risk screen, instruct family/caregiver to ask for assistance with transferring infant if caregiver noted to have fall risk factors     Problem: ABCDS Injury Assessment  Goal: Absence of physical injury  7/15/2022 0326 by Tania Molina RN  Outcome: Progressing  7/14/2022 1743 by Lilia Farley RN  Outcome: Progressing  Flowsheets (Taken 7/14/2022 1317)  Absence of Physical Injury: Implement safety measures based on patient assessment     Problem: Chronic Conditions and Co-morbidities  Goal: Patient's chronic conditions and co-morbidity symptoms are monitored and maintained or improved  7/15/2022 0326 by Tania Molina RN  Outcome: Progressing  7/14/2022 1743 by Lilia Farley RN  Outcome: Progressing  Flowsheets (Taken 7/14/2022 0835)  Care Plan - Patient's Chronic Conditions and Co-Morbidity Symptoms are Monitored and Maintained or Improved: Monitor and assess patient's chronic conditions and comorbid symptoms for stability, deterioration, or improvement     Problem: Skin/Tissue Integrity  Goal: Absence of new skin breakdown  Description: 1. Monitor for areas of redness and/or skin breakdown  2. Assess vascular access sites hourly  3. Every 4-6 hours minimum:  Change oxygen saturation probe site  4. Every 4-6 hours:  If on nasal continuous positive airway pressure, respiratory therapy assess nares and determine need for appliance change or resting period.   Outcome: Progressing

## 2022-07-15 NOTE — CARE COORDINATION
SW left a message with Shai Man at [facility] to follow up on status of Pre-cert.      Electronically signed by Milton Jiménez on 7/15/22 at 11:58 AM EDT

## 2022-07-15 NOTE — PROGRESS NOTES
Physical Therapy  Facility/Department: [facility] MED SURG  Daily Treatment Note  NAME: Tiffany Albarran  : 1950  MRN: [id number]    Date of Service: 7/15/2022    Discharge Recommendations:  Patient would benefit from continued therapy after discharge, Therapy recommended at discharge        Patient Diagnosis(es): The primary encounter diagnosis was Closed fracture of body of sternum, initial encounter. Diagnoses of Closed fracture of one rib of right side, initial encounter and Contusion of face, initial encounter were also pertinent to this visit. Assessment   Activity Tolerance: Patient limited by pain; Patient limited by endurance     Plan    Plan  Plan: 6-7 times per week  Specific Instructions for Next Treatment: BLE strengthening with focus on hip flexors and core; Focused body mechanics training during descent to chair; Progress ambulation distance  Current Treatment Recommendations: Strengthening;ROM;Balance training;Functional mobility training;Transfer training; Endurance training;Gait training;Pain management; Safety education & training; Therapeutic activities     Restrictions  Restrictions/Precautions  Restrictions/Precautions: Fall Risk, General Precautions  Required Braces or Orthoses?: No  Implants present? : Metal implants (Spinal Fusion Hardware)  Position Activity Restriction  Other position/activity restrictions: subacute sternum fracture; avoid excessive pushing/pulling/lifting     Subjective    Subjective  Subjective: Pt is awake upon arrival. Pt dealing with high pain in right ribs this date.   Pain: 9/10  Orientation  Overall Orientation Status: Within Functional Limits  Orientation Level: Oriented X4  Cognition  Overall Cognitive Status: WFL     Objective   Vitals     Bed Mobility Training  Bed Mobility Training: Yes  Interventions: Verbal cues (VCs for log rolling tech for pain mgt)  Rolling: Stand-by assistance  Supine to Sit: Stand-by assistance  Sit to Supine: Stand-by assistance  Scooting: Verbal  Barriers to Learning: None  Education Outcome: Verbalized understanding; Unable to demonstrate understanding;Continued education needed    Therapy Time   Individual Concurrent Group Co-treatment   Time In [facility]         Time Out 0937         Minutes [address], [place]

## 2022-07-15 NOTE — PLAN OF CARE
Problem: Discharge Planning  Goal: Discharge to home or other facility with appropriate resources  Outcome: Progressing  Flowsheets (Taken 7/15/2022 0845)  Discharge to home or other facility with appropriate resources:   Identify barriers to discharge with patient and caregiver   Identify discharge learning needs (meds, wound care, etc)     Problem: Pain  Goal: Verbalizes/displays adequate comfort level or baseline comfort level  Outcome: Progressing     Problem: Safety - Adult  Goal: Free from fall injury  Outcome: Progressing  Flowsheets (Taken 7/15/2022 1112)  Free From Fall Injury: Instruct family/caregiver on patient safety     Problem: ABCDS Injury Assessment  Goal: Absence of physical injury  Outcome: Progressing  Flowsheets (Taken 7/15/2022 1112)  Absence of Physical Injury: Implement safety measures based on patient assessment     Problem: Chronic Conditions and Co-morbidities  Goal: Patient's chronic conditions and co-morbidity symptoms are monitored and maintained or improved  Outcome: Progressing  Flowsheets (Taken 7/15/2022 0845)  Care Plan - Patient's Chronic Conditions and Co-Morbidity Symptoms are Monitored and Maintained or Improved: Monitor and assess patient's chronic conditions and comorbid symptoms for stability, deterioration, or improvement     Problem: Skin/Tissue Integrity  Goal: Absence of new skin breakdown  Description: 1. Monitor for areas of redness and/or skin breakdown  2. Assess vascular access sites hourly  3. Every 4-6 hours minimum:  Change oxygen saturation probe site  4. Every 4-6 hours:  If on nasal continuous positive airway pressure, respiratory therapy assess nares and determine need for appliance change or resting period.   Outcome: Progressing

## 2022-07-15 NOTE — PROGRESS NOTES
Occupational Therapy  Facility/Department: [facility] MED SURG  Daily Treatment Note  NAME: Parke Mohs  : 1950  MRN: [id number]    Date of Service: 7/15/2022    Discharge Recommendations:  Patient would benefit from continued therapy after discharge  OT Equipment Recommendations  Other: TBD      Patient Diagnosis(es): The primary encounter diagnosis was Closed fracture of body of sternum, initial encounter. Diagnoses of Closed fracture of one rib of right side, initial encounter and Contusion of face, initial encounter were also pertinent to this visit. Assessment    Activity Tolerance: Patient tolerated treatment well;Patient limited by pain  Discharge Recommendations: Patient would benefit from continued therapy after discharge  Other: TBD      Plan   Plan  Times per Week: 4-5  Current Treatment Recommendations: Strengthening;ROM;Endurance training;Functional mobility training;Balance training; Safety education & training;Pain management;Patient/Caregiver education & training;Equipment evaluation, education, & procurement;Positioning;Self-Care / ADL     Restrictions       Subjective   Subjective  Subjective: \"Probably all of them\" (When asked how many falls she thought could be avoided by making better decisions)  Pain: Pt reports 8/10 pain, mostly in R ribcage. Orientation  Overall Orientation Status: Within Functional Limits  Orientation Level: Oriented X4  Pain: 9/10  Cognition  Overall Cognitive Status: Exceptions  Safety Judgement: Decreased awareness of need for safety  Cognition Comment: Pt agrees that she makes unsafe choices which lead to her fequent falls.   Patient affect[de-identified] Normal        Objective    Vitals     Bed Mobility Training  Bed Mobility Training: Yes  Interventions: Verbal cues (VCs for log rolling tech for pain mgt)  Rolling: Stand-by assistance  Supine to Sit: Stand-by assistance  Sit to Supine: Stand-by assistance  Scooting: Stand-by assistance  Balance  Sitting: Impaired  Sitting - Static: Fair (occasional)  Sitting - Dynamic: Poor (constant support)  Standing: Impaired  Standing - Static: Poor  Standing - Dynamic: Poor  Transfer Training  Transfer Training: Yes  Interventions: Manual cues; Safety awareness training; Tactile cues; Verbal cues  Sit to Stand: Minimum assistance  Stand to Sit: Minimum assistance  Stand Pivot Transfers: Minimum assistance  Bed to Chair: Minimum assistance; Additional time; Adaptive equipment;Setup;Assist X1  Gait Training  Gait Training: Yes  Gait  Overall Level of Assistance: Minimum assistance;Assist X1;Additional time; Adaptive equipment  Interventions: Verbal cues; Safety awareness training (To keep RW within KAREEM)  Speed/Alicia: Slow  Step Length: Left lengthened;Right shortened  Swing Pattern: Right asymmetrical  Stance: Left decreased;Right increased  Gait Abnormalities: Antalgic;Decreased step clearance; Path deviations; Step to gait  Distance (ft): 15 Feet  Assistive Device: Walker, rolling     ADL  Feeding: Setup  Grooming: Setup  Grooming Skilled Clinical Factors: Washed face/brushed teeth while long-sitting in bed. UE Bathing: Stand by assistance  LE Bathing: Maximum assistance  UE Dressing Skilled Clinical Factors: Donned gown with Min A to don/doff over RUE  Additional Comments: Pt completed bathing, grooming, and gown change while long-sitting in bed. Pt declined getting up to EOB or chair, reporting that she had just returned from sitting up in the chair for several hours. Patient Education  Education Given To: Patient  Education Provided: Role of Therapy; ADL Adaptive Strategies; Fall Prevention Strategies  Education Method: Verbal  Barriers to Learning: None  Education Outcome: Verbalized understanding;Continued education needed    Goals  Short Term Goals  Time Frame for Short term goals: By discharge  Short Term Goal 1: Pt will perform UB self care with supervision and Good safety  Short Term Goal 2: Pt will perform LB self care with SBA, using appropriate adaptive equipment/adaptive strategies, and Good safety  Short Term Goal 3: Pt will tolerate standing for 3-5 minutes during 1-2 handed functional tasks to improve independence and safety with standing ADLs/IADLs.    Short Term Goal 4: Pt will perform functional transfers/mobility with SBA, using least restrictive device, during self care  Short Term Goal 5: Pt will actively participate in 15+ minutes of therapeutic exercise/functional activity to promote safety and independence with self care and mobility       Therapy Time   Individual Concurrent Group Co-treatment   Time In 1422         Time Out 1449         Minutes 27         Timed Code Treatment Minutes: Elías Morrison

## 2022-07-15 NOTE — PROGRESS NOTES
[place] [address] Internal Medicine    Progress Note     7/15/2022    10:15 AM    Name:   Yamil Messina  MRN:     [id number]     Acct:      [de-identified]   Room:   2071/2071-01   Day:  0  Admit Date:  7/10/2022 12:48 PM    PCP:   Jessica Chan MD  Code Status:  Full Code    Subjective:     C/C:   Chief Complaint   Patient presents with   Ardie Runner    Head Injury    Shortness of Breath    Hip Pain     left     Principal Problem:    Fracture of body of sternum, initial encounter for open fracture  Active Problems:    Nondisplaced fracture of sternal end of left clavicle, initial encounter for closed fracture    Erysipelas of right lower extremity    Closed fracture of body of sternum    Calculus of gallbladder without cholecystitis without obstruction    Leukocytosis    Type 2 diabetes mellitus with stage 3 chronic kidney disease ([facility] [address]    Allergy to sulfa drugs    Deep vein thrombosis (DVT) of right lower extremity ([facility] [address]  Resolved Problems:    * No resolved hospital problems.  *      Reports feeling fine  Denies any complaints    Has been afebrile  Blood pressure has been labile  HR stable   On room air    Labs -   Hb 10  Wbc 8.6  Cr 0.73    On rocephin for RLE cellulitis, till 07/25/2022        Pending placement to inpatient rehab     Recent Results (from the past 24 hour(s))   CBC with Auto Differential    Collection Time: 07/15/22  5:41 AM   Result Value Ref Range    WBC 8.6 3.5 - 11.0 k/uL    RBC 3.22 (L) 4.0 - 5.2 m/uL    Hemoglobin 10.1 (L) 12.0 - 16.0 g/dL    Hematocrit 30.3 (L) 36 - 46 %    MCV 94.3 80 - 100 fL    MCH 31.5 26 - 34 pg    MCHC 33.4 31 - 37 g/dL    RDW 13.8 11.5 - 14.9 %    Platelets 071 061 - 589 k/uL    MPV 7.9 6.0 - 12.0 fL    Seg Neutrophils 66 36 - 66 %    Lymphocytes 16 (L) 24 - 44 %    Monocytes 10 (H) 1 - 7 %    Eosinophils % 7 (H) 0 - 4 %    Basophils 1 0 - 2 %    Segs Absolute 5.70 1.3 - 9.1 k/uL    Absolute Lymph # 1.40 1.0 - 4.8 k/uL    Absolute Mono # 0.90 0.1 - 1.3 k/uL    Absolute Eos # 0.60 (H) 0.0 - 0.4 k/uL    Basophils Absolute 0.00 0.0 - 0.2 k/uL   Basic Metabolic Panel    Collection Time: 07/15/22  5:41 AM   Result Value Ref Range    Glucose 101 (H) 70 - 99 mg/dL    BUN 17 8 - 23 mg/dL    CREATININE 0.73 0.50 - 0.90 mg/dL    Calcium 8.1 (L) 8.6 - 10.4 mg/dL    Sodium 140 135 - 144 mmol/L    Potassium 4.0 3.7 - 5.3 mmol/L    Chloride 108 (H) 98 - 107 mmol/L    CO2 23 20 - 31 mmol/L    Anion Gap 9 9 - 17 mmol/L    GFR Non-African American >60 >60 mL/min    GFR African American >60 >60 mL/min    GFR Comment           Recent Labs     07/12/22  1131 07/12/22  1639 07/13/22  0719 07/13/22  1114   POCGLU 142* 116* 105 154*        No results found. On admission     69-year-old female presenting with a fall which happened 2 days ago  Resulted in head injury to right side of head patient is on blood thinners-CT head negative in ER  Complaining of left hip pain at presentation unclear onset     Imaging in ER also showed right lateral 10th rib fracture, subacute sternal body fracture, multiple old left-sided rib fractures     Medical history includes CAD, CHF, GERD, hypertension, hyperlipidemia, prediabetes, history of recurrent DVT       Review of Systems:     As recorded in HPI          Physical Examination:        Vitals:    07/14/22 1829 07/14/22 2103 07/15/22 0616 07/15/22 0847   BP: (!) 146/63 (!) 134/50 108/65    Pulse: 84 74 69    Resp: 20  18 16   Temp: 98.8 °F (37.1 °C)  97.3 °F (36.3 °C)    TempSrc: Oral      SpO2: 91%  93%    Weight:       Height:           Recent Labs     07/12/22  1131 07/12/22  1639 07/13/22  0719 07/13/22  1114   POCGLU 142* 116* 105 154*       Intake/Output Summary (Last 24 hours) at 7/15/2022 1015  Last data filed at 7/15/2022 0133  Gross per 24 hour   Intake --   Output 550 ml   Net -550 ml       General Appearance:  alert, well appearing, and in no acute distress  Mental status:   Head:  normocephalic, atraumatic.   Eye: no icterus, redness, pupils equal and reactive, extraocular eye movements intact, conjunctiva clear  Ear: normal external ear, no discharge, hearing intact  Nose:  no drainage noted  Mouth: mucous membranes moist  Neck: supple, no carotid bruits, thyroid not palpable  Lungs: Bilateral equal air entry, clear to ausculation, no wheezing, rales or rhonchi, normal effort  Cardiovascular: normal rate, regular rhythm, no murmur, gallop, rub. Abdomen: Soft, nontender, nondistended, normal bowel sounds, no hepatomegaly or splenomegaly  Neurologic: There are no new focal motor or sensory deficits,   Skin: No gross lesions, rashes, bruising or bleeding on exposed skin area  Extremities:  redness and swelling of the right LL  Psych:             Data:     PLabs:    BMP:   Recent Labs     07/14/22  0601 07/15/22  0541    140   K 3.8 4.0   CO2 21 23   BUN 18 17   CREATININE 0.72 0.73   LABGLOM >60 >60   GLUCOSE 99 101*                 Medications: Allergies:     Allergies   Allergen Reactions    Bactrim [Sulfamethoxazole-Trimethoprim] Other (See Comments)     confusion    Codeine Itching    Diazepam Other (See Comments)    Meperidine Hcl Other (See Comments)    Seasonal        Current Meds:   Scheduled Meds:    amLODIPine  5 mg Oral Daily    carvedilol  12.5 mg Oral BID    cefTRIAXone (ROCEPHIN) IV  1,000 mg IntraVENous Q24H    apixaban  5 mg Oral BID    sodium chloride flush  5-40 mL IntraVENous 2 times per day    pantoprazole (PROTONIX) 40 mg injection  40 mg IntraVENous Daily    atorvastatin  40 mg Oral Nightly    busPIRone  5 mg Oral TID    citalopram  20 mg Oral Daily    ferrous sulfate  325 mg Oral BID    pramipexole  0.5 mg Oral Nightly     Continuous Infusions:    sodium chloride      dextrose       PRN Meds: sodium chloride flush, sodium chloride flush, sodium chloride flush, sodium chloride, ondansetron **OR** ondansetron, polyethylene glycol, acetaminophen **OR** acetaminophen, fentanNYL, fentanNYL, oxyCODONE-acetaminophen, melatonin, traZODone, glucose, dextrose bolus **OR** dextrose bolus, glucagon (rDNA), dextrose, ipratropium-albuterol          Assessment:        Primary Problem  Fracture of body of sternum, initial encounter for open fracture    Principal Problem:    Fracture of body of sternum, initial encounter for open fracture  Active Problems:    Nondisplaced fracture of sternal end of left clavicle, initial encounter for closed fracture    Erysipelas of right lower extremity    Closed fracture of body of sternum    Calculus of gallbladder without cholecystitis without obstruction    Leukocytosis    Type 2 diabetes mellitus with stage 3 chronic kidney disease (HCC)    Allergy to sulfa drugs    Deep vein thrombosis (DVT) of right lower extremity ([address]  Resolved Problems:    * No resolved hospital problems. *       Plan:          7/15/22    Continue rocephin IV daily  Monitor vitals  Continue norvasc and coreg  continue eliquis  Placement to inpatient rehab in process      . Hospital Problems             Last Modified POA    * (Principal) Fracture of body of sternum, initial encounter for open fracture 7/10/2022 Yes    Nondisplaced fracture of sternal end of left clavicle, initial encounter for closed fracture 7/10/2022 Yes    Erysipelas of right lower extremity 7/11/2022 Yes    Closed fracture of body of sternum 7/11/2022 Yes    Calculus of gallbladder without cholecystitis without obstruction 7/11/2022 Yes    Leukocytosis 7/11/2022 Yes    Type 2 diabetes mellitus with stage 3 chronic kidney disease ([address] 7/11/2022 Yes    Allergy to sulfa drugs 7/11/2022 Yes    Deep vein thrombosis (DVT) of right lower extremity ([address] 7/11/2022 Yes                      Thanks for consulting us . Will monitor vitals and clinical course , and  Optimize therapy  as needed .            Araceli Parsons MD  7/15/2022      Attestation and add on       I have discussed the care of Brett Jon including pertinent history and exam findings,      7/15/22    with the resident. I have seen and examined the patient and the key elements of all parts of the encounter have been performed by me . I agree with the assessment, plan and orders as documented by the resident. Hospital Problems             Last Modified POA    * (Principal) Fracture of body of sternum, initial encounter for open fracture 7/10/2022 Yes    Nondisplaced fracture of sternal end of left clavicle, initial encounter for closed fracture 7/10/2022 Yes    Erysipelas of right lower extremity 7/11/2022 Yes    Closed fracture of body of sternum 7/11/2022 Yes    Calculus of gallbladder without cholecystitis without obstruction 7/11/2022 Yes    Leukocytosis 7/11/2022 Yes    Type 2 diabetes mellitus with stage 3 chronic kidney disease ([facility] [address] 7/11/2022 Yes    Allergy to sulfa drugs 7/11/2022 Yes    Deep vein thrombosis (DVT) of right lower extremity ([facility] [address] 7/11/2022 Yes          ''''''''''       MD GENA Ruffin [address], [address] [place] [address].    Phone [phone]   Fax: [phone]  Answering Service: [phone]

## 2022-07-15 NOTE — PROGRESS NOTES
[facility] - Initial Consult Note  Today's Date and Time: 7/15/2022, 3:09 PM    Impression :   RLE Erisepylas  Cholelithiasis. Leukocytosis  Elevated CRP  Recent fall. DM  CKD III  Allergy to Sulfa-Confusion. Recommendations:   Ceftriaxone 1 gm IV daily x 14 days until 7/25/22. Follow CBC and renal function closely. Follow BC. Supportive care. Medical Decision Making/Summary/Discussion:7/15/2022     Pen G is ideal therapy. Not a good choice due to Na levels, pt. Decreased renal function,hx of CHF. Small break in the skin noted to the right lateral calf. Scant amount of serous drainage. Check blood cultures. Infection Control Recommendations   [place] Precautions    Antimicrobial Stewardship Recommendations     Simplification of therapy  Targeted therapy    Coordination of Outpatient Care:   Estimated Length of IV antimicrobials:14 days until 7/25/22  Patient will need Midline Catheter Insertion: Midline placed 7/13/22  Patient will need PICC line Insertion:BD  Patient will need: Home IV , [place],  SNF,  LTAC: TBD  Patient will need outpatient wound care:    Chief complaint/reason for consultation:   Possible RLE Cellulitis      History of Present Illness:   Sherri Dye is a 71y.o.-year-old female who was initially admitted on 7/10/2022. Patient seen at the request of     INITIAL HISTORY:  81-year-old female who presented to the ED s/p fall 2 days ago. Patient states she trying to open a box of food and she slipped and fell. She hit her right side on something, she is not sure what. Angel loss of consciousness. Associated symptoms include left hip pain, pain to her right rib cage and right upper abdomen. No associated symptoms of headache, neck or back pain. She is on blood thinners. CT results reviewed as below. Recent hospitalization 6/5-6/14 for BLE cellulitis.   Treated with IV Vancomycin, discharged on Doxycycline x 7 days.  Patient presents with BLE redness and swelling. Upon examination patient has erysepilas to the RLE, mid-forefoot to mid-thigh. Light pink in color. No open wounds or areas of skin breaks. Patient states she has had this for about 2-weeks. Denies any pain to the RLE. CURRENT EVALUATION 7/15/2022  /86   Pulse 79   Temp 98.3 °F (36.8 °C)   Resp 15   Ht 5' 3\" (1.6 m)   Wt 192 lb 10.9 oz (87.4 kg)   SpO2 95%   BMI 34.13 kg/m²   Feeling good. Resting comfortably. Denies any fever, chills, n/v/d. C/o rib pain. Small open area to right lateral calf. Scant amount of serous drainage. RLE improving. Decreased area of pink. WBC normalized. Discharge planning. Labs, X rays reviewed: 7/15/2022    BUN:44>44>48  Cr:1.26>1.25>1.52>1.40>0.79>0.72>0.73    WBC:16.0>14.6>11.7>9.5>9.9>8.6  Hb:  Plat: 493>092>138    CRP:166.1    Cultures:  Urine:  7/11 UA-Negative. Blood:  7/12 Negative to date. Sputum :    Wound:    MRSA Nares:      Imaging:  XR HIP 2-3 VW W PELVIS LEFT   Preliminary Result   No acute bony abnormality identified. CT CHEST ABDOMEN PELVIS W CONTRAST   Preliminary Result   No acute traumatic abnormality identified in the chest, abdomen, or pelvis. Subacute mildly displaced proximal sternal body fracture. Age-indeterminate buckle fracture of the right lateral 10th rib. Multiple   old left-sided rib fractures. Distended gallbladder containing a stone at the gallbladder neck. Postsurgical changes in the lower lumbar spine with chronic appearing height   loss of L5.           CT CERVICAL SPINE WO CONTRAST   Final Result   1. Kyphosis of the cervical spine centered at C6-C7. 2. Mild multilevel degenerative changes in the cervical spine primarily at   C5-C6. 3. No acute vertebral body height loss in the cervical spine. 4. Evidence of posterior spinal fusion from C3-C6.        RECOMMENDATIONS:   Unavailable           CT FACIAL BONES WO CONTRAST clot    History of ovarian cyst 1970    had oopherectomy holly    History of peritonitis 1968    due to ruptured appendix age 13    HTN (hypertension)     Hx of blood clots     right leg    Hyperlipidemia     Intestinal or peritoneal adhesions with obstruction (postoperative) (postinfection) ([address]     Kidney infection     renal failure/sepsis/spider bite    Lateral epicondylitis  of elbow     MDRO (multiple drug resistant organisms) resistance     c diff    Muscle strain     right posterior shoulder    Other abnormal glucose     PONV (postoperative nausea and vomiting)     dry heaves    Pre-diabetes     Restless legs syndrome (RLS)     Snores     no cpap    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)     TIA (transient ischemic attack) 2014    Uses walker     Vitamin D deficiency     Wears glasses     Wellness examination     last seen 2 weeks ago       Past Surgical  History:     Past Surgical History:   Procedure Laterality Date    ABDOMEN SURGERY  1976    benign tumor removed near remaining ovary, 1.5 pounds    APPENDECTOMY  1968    appendix ruptured, developed peritonitis    BACK SURGERY      BUNIONECTOMY Left     along with calcium deposits removed    CARDIAC CATHETERIZATION      CARDIAC CATHETERIZATION  2005    negative    CERVICAL FUSION  05/21/2021    POSTERIOR C3-6 LAMINECTOMY, PARTIAL C7 LAMINECTOMY, FUSION C3-C6, SILVERCORD    CERVICAL FUSION N/A 5/21/2021    POSTERIOR C3-6 LAMINECTOMY, PARTIAL C7 LAMINECTOMY, FUSION C3-C6, SILVERCORD performed by Sujatha Agustin DO at [address] [place] [address]    12 INCHES REMOVED D/T OBSTRUCTION    COLONOSCOPY      CYST REMOVAL Right     right facial    HYSTERECTOMY (CERVIX STATUS UNKNOWN)  1973    taken as a result of recurring cysts    LUMBAR FUSION N/A 02/10/2020    LUMBAR L4-5 POSTERIOR  DECOMPRESSION INSTRUMENTATION FUSION WCEMENT AUGMENTATION/ performed by Guerda Arita MD at [facility] N/A 06/17/2020    L5-S1 PLIF L4-L5 REVISION performed by Jose Rosales MD at [address] [facility]  2014    FESS    OVARY REMOVAL  1970    UNILATERAL due to cyst    OVARY REMOVAL      partial, due to cyst    SINUS SURGERY      UPPER GASTROINTESTINAL ENDOSCOPY N/A 2019    EGD ESOPHAGOGASTRODUODENOSCOPY performed by Eugenie Alston MD at [place] [address] N/A 2019    EGD BIOPSY performed by Ambreen Pierre MD at [place] [address] N/A 2019    EGD BIOPSY performed by Eugenie Alston MD at [address] Left 2019    WRIST OPEN REDUCTION INTERNAL FIXATION performed by Germania De La Paz MD at [address] [place] [address] OR       Medications:      [START ON 2022] pantoprazole  40 mg Oral QAM AC    amLODIPine  5 mg Oral Daily    carvedilol  12.5 mg Oral BID    cefTRIAXone (ROCEPHIN) IV  1,000 mg IntraVENous Q24H    apixaban  5 mg Oral BID    sodium chloride flush  5-40 mL IntraVENous 2 times per day    atorvastatin  40 mg Oral Nightly    busPIRone  5 mg Oral TID    citalopram  20 mg Oral Daily    ferrous sulfate  325 mg Oral BID    pramipexole  0.5 mg Oral Nightly       Social History:     Social History     Socioeconomic History    Marital status:      Spouse name: Not on file    Number of children: Not on file    Years of education: Not on file    Highest education level: Not on file   Occupational History    Occupation: retired   Tobacco Use    Smoking status: Former     Packs/day: 0.50     Years: 20.00     Pack years: 10.00     Types: Cigarettes     Start date: 1995     Quit date: 2017     Years since quittin.0    Smokeless tobacco: Never   Vaping Use    Vaping Use: Never used   Substance and Sexual Activity    Alcohol use: No     Alcohol/week: 0.0 standard drinks    Drug use: No    Sexual activity: Not on file   Other Topics Concern    Not on file   Social History Narrative    Not on file     Social Determinants of Health     Financial Resource Strain: Not on file   Food Insecurity: Not on file   Transportation Needs: Not on file   Physical Activity: Not on file   Stress: Not on file   Social Connections: Not on file   Intimate Partner Violence: Not on file   Housing Stability: Not on file       Family History:     Family History   Problem Relation Age of Onset    Stroke Mother     Diabetes Mother     Heart Disease Mother     High Blood Pressure Mother     Heart Disease Father     Heart Disease Brother     High Blood Pressure Brother     Heart Disease Maternal Grandmother     High Blood Pressure Sister         Allergies:   Bactrim [sulfamethoxazole-trimethoprim], Codeine, Diazepam, Meperidine hcl, and Seasonal     Review of Systems:   Constitutional: No fevers or chills. No systemic complaints  Head: No headaches  Eyes: No double vision or blurry vision. No conjunctival inflammation. R orbit echymotic. ENT: No sore throat or runny nose. Cardiovascular: No chest pain or palpitations. No shortness of breath. No [place]  Lung: No shortness of breath or cough. No sputum production  Abdomen: No nausea, vomiting, diarrhea, or abdominal pain. [place] No cramps. Genitourinary: No increased urinary frequency, or dysuria. No hematuria. No suprapubic or CVA pain  Musculoskeletal:c/o pain to broken rib. Hematologic: No bleeding or bruising. Neurologic: No headache, weakness, numbness, or tingling. Integument: No rash, no ulcers. 2-week hx of redness to RLE. Psychiatric: No depression. Endocrine: No polyuria, no polydipsia, no polyphagia. Physical Examination :     Patient Vitals for the past 8 hrs:   BP Temp Pulse Resp SpO2   07/15/22 1259 134/86 -- 79 -- --   07/15/22 1227 134/86 98.3 °F (36.8 °C) 79 15 95 %   07/15/22 0847 -- -- -- 16 --       General Appearance: Awake, alert, and in no apparent distress  Head:  Normocephalic, right orbit ecchymotic from fall.   Eyes: Pupils equal, round, reactive to light and accommodation; extraocular movements intact; sclera anicteric; conjunctivae pink. ENT: Oropharynx clear, without erythema, exudate, or thrush. No tenderness of sinuses. Mouth/throat: mucosa pink and moist.   Neck:Supple, without lymphadenopathy. No JVD, tracheal deviation. Pulmonary/Chest: Clear to auscultation, without wheezes, rales, or rhonchi. Cardiovascular: Regular rate and rhythm without murmurs, rubs, or gallops. Abdomen: Soft, non tender. Bowel sounds normal.   All four Extremities: No cyanosis, clubbing. 1+edema to RLE. Neurologic: No gross sensory or motor deficits. Skin: Warm and dry with good turgor. RLE pink, warm from just above the ankle to just above the knee. Small skin break to right lateral calf. No induration. Medical Decision Making -Laboratory:   I have independently reviewed/ordered the following labs:    CBC with Differential:   Recent Labs     07/14/22  0601 07/15/22  0541   WBC 9.9 8.6   HGB 11.2* 10.1*   HCT 34.6* 30.3*    238   LYMPHOPCT 12* 16*   MONOPCT 7 10*       BMP:   Recent Labs     07/14/22  0601 07/15/22  0541    140   K 3.8 4.0   * 108*   CO2 21 23   BUN 18 17   CREATININE 0.72 0.73       Hepatic Function Panel:   No results for input(s): PROT, LABALBU, BILIDIR, IBILI, BILITOT, ALKPHOS, ALT, AST in the last 72 hours. No results for input(s): RPR in the last 72 hours. No results for input(s): HIV in the last 72 hours. No results for input(s): BC in the last 72 hours.   Lab Results   Component Value Date/Time    MUCUS NOT REPORTED 08/21/2019 10:00 PM    RBC 3.22 07/15/2022 05:41 AM    TRICHOMONAS NOT REPORTED 08/21/2019 10:00 PM    WBC 8.6 07/15/2022 05:41 AM    YEAST NOT REPORTED 08/21/2019 10:00 PM    TURBIDITY Clear 07/11/2022 06:13 PM     Lab Results   Component Value Date/Time    CREATININE 0.73 07/15/2022 05:41 AM    GLUCOSE 101 07/15/2022 05:41 AM       Medical Decision Making-Imaging:       Medical Decision Cegwth-Wtmibcum-Elpfs:       Medical Decision Making-Other: Note:  Labs, medications, radiologic studies were reviewed with personal review of films  Large amounts of data were reviewed  Discussed with nursing Staff, Discharge planner  Infection Control and Prevention measures reviewed  All prior entries were reviewed  Administer medications as ordered  Prognosis: Good  Discharge planning reviewed  Follow up as outpatient. Thank you for allowing us to participate in the care of this patient. Please call with questions.     MAIK Holly - CNP    Perfect Serve/Office: [phone]

## 2022-07-16 LAB
ABSOLUTE EOS #: 0.35 K/UL (ref 0–0.4)
ABSOLUTE LYMPH #: 1.58 K/UL (ref 1–4.8)
ABSOLUTE MONO #: 0.53 K/UL (ref 0.1–1.3)
ANION GAP SERPL CALCULATED.3IONS-SCNC: 10 MMOL/L (ref 9–17)
BASOPHILS # BLD: 0 % (ref 0–2)
BASOPHILS ABSOLUTE: 0 K/UL (ref 0–0.2)
BUN BLDV-MCNC: 14 MG/DL (ref 8–23)
C-REACTIVE PROTEIN: 63.4 MG/L (ref 0–5)
CALCIUM SERPL-MCNC: 8.6 MG/DL (ref 8.6–10.4)
CHLORIDE BLD-SCNC: 109 MMOL/L (ref 98–107)
CO2: 24 MMOL/L (ref 20–31)
CREAT SERPL-MCNC: 0.63 MG/DL (ref 0.5–0.9)
EOSINOPHILS RELATIVE PERCENT: 4 % (ref 0–4)
GFR AFRICAN AMERICAN: >60 ML/MIN
GFR NON-AFRICAN AMERICAN: >60 ML/MIN
GFR SERPL CREATININE-BSD FRML MDRD: ABNORMAL ML/MIN/{1.73_M2}
GLUCOSE BLD-MCNC: 106 MG/DL (ref 70–99)
HCT VFR BLD CALC: 33.2 % (ref 36–46)
HEMOGLOBIN: 11.1 G/DL (ref 12–16)
LYMPHOCYTES # BLD: 18 % (ref 24–44)
MCH RBC QN AUTO: 31.3 PG (ref 26–34)
MCHC RBC AUTO-ENTMCNC: 33.5 G/DL (ref 31–37)
MCV RBC AUTO: 93.4 FL (ref 80–100)
MONOCYTES # BLD: 6 % (ref 1–7)
MORPHOLOGY: ABNORMAL
PDW BLD-RTO: 13.8 % (ref 11.5–14.9)
PLATELET # BLD: 310 K/UL (ref 150–450)
PMV BLD AUTO: 7.4 FL (ref 6–12)
POTASSIUM SERPL-SCNC: 4 MMOL/L (ref 3.7–5.3)
RBC # BLD: 3.56 M/UL (ref 4–5.2)
SEG NEUTROPHILS: 72 % (ref 36–66)
SEGMENTED NEUTROPHILS ABSOLUTE COUNT: 6.34 K/UL (ref 1.3–9.1)
SODIUM BLD-SCNC: 143 MMOL/L (ref 135–144)
WBC # BLD: 8.8 K/UL (ref 3.5–11)

## 2022-07-16 PROCEDURE — G0378 HOSPITAL OBSERVATION PER HR: HCPCS

## 2022-07-16 PROCEDURE — 6360000002 HC RX W HCPCS: Performed by: NURSE PRACTITIONER

## 2022-07-16 PROCEDURE — 6370000000 HC RX 637 (ALT 250 FOR IP): Performed by: NURSE PRACTITIONER

## 2022-07-16 PROCEDURE — 85025 COMPLETE CBC W/AUTO DIFF WBC: CPT

## 2022-07-16 PROCEDURE — 2580000003 HC RX 258: Performed by: SURGERY

## 2022-07-16 PROCEDURE — 97116 GAIT TRAINING THERAPY: CPT

## 2022-07-16 PROCEDURE — 6370000000 HC RX 637 (ALT 250 FOR IP): Performed by: SURGERY

## 2022-07-16 PROCEDURE — 96366 THER/PROPH/DIAG IV INF ADDON: CPT

## 2022-07-16 PROCEDURE — 99231 SBSQ HOSP IP/OBS SF/LOW 25: CPT | Performed by: INTERNAL MEDICINE

## 2022-07-16 PROCEDURE — 99232 SBSQ HOSP IP/OBS MODERATE 35: CPT | Performed by: NURSE PRACTITIONER

## 2022-07-16 PROCEDURE — 97110 THERAPEUTIC EXERCISES: CPT

## 2022-07-16 PROCEDURE — 6370000000 HC RX 637 (ALT 250 FOR IP): Performed by: INTERNAL MEDICINE

## 2022-07-16 PROCEDURE — 2580000003 HC RX 258: Performed by: NURSE PRACTITIONER

## 2022-07-16 PROCEDURE — 36415 COLL VENOUS BLD VENIPUNCTURE: CPT

## 2022-07-16 PROCEDURE — 6370000000 HC RX 637 (ALT 250 FOR IP)

## 2022-07-16 PROCEDURE — 80048 BASIC METABOLIC PNL TOTAL CA: CPT

## 2022-07-16 PROCEDURE — 86140 C-REACTIVE PROTEIN: CPT

## 2022-07-16 RX ADMIN — BUSPIRONE HYDROCHLORIDE 5 MG: 5 TABLET ORAL at 20:47

## 2022-07-16 RX ADMIN — APIXABAN 5 MG: 5 TABLET, FILM COATED ORAL at 09:08

## 2022-07-16 RX ADMIN — ONDANSETRON 4 MG: 4 TABLET, ORALLY DISINTEGRATING ORAL at 09:07

## 2022-07-16 RX ADMIN — FERROUS SULFATE TAB 325 MG (65 MG ELEMENTAL FE) 325 MG: 325 (65 FE) TAB at 10:55

## 2022-07-16 RX ADMIN — PANTOPRAZOLE SODIUM 40 MG: 40 TABLET, DELAYED RELEASE ORAL at 06:22

## 2022-07-16 RX ADMIN — FERROUS SULFATE TAB 325 MG (65 MG ELEMENTAL FE) 325 MG: 325 (65 FE) TAB at 20:47

## 2022-07-16 RX ADMIN — OXYCODONE HYDROCHLORIDE AND ACETAMINOPHEN 1 TABLET: 5; 325 TABLET ORAL at 15:11

## 2022-07-16 RX ADMIN — ATORVASTATIN CALCIUM 40 MG: 40 TABLET, FILM COATED ORAL at 20:47

## 2022-07-16 RX ADMIN — CEFTRIAXONE SODIUM 1000 MG: 1 INJECTION, POWDER, FOR SOLUTION INTRAMUSCULAR; INTRAVENOUS at 13:26

## 2022-07-16 RX ADMIN — SODIUM CHLORIDE, PRESERVATIVE FREE 10 ML: 5 INJECTION INTRAVENOUS at 20:48

## 2022-07-16 RX ADMIN — OXYCODONE HYDROCHLORIDE AND ACETAMINOPHEN 1 TABLET: 5; 325 TABLET ORAL at 10:57

## 2022-07-16 RX ADMIN — CARVEDILOL 12.5 MG: 12.5 TABLET, FILM COATED ORAL at 20:47

## 2022-07-16 RX ADMIN — BUSPIRONE HYDROCHLORIDE 5 MG: 5 TABLET ORAL at 09:08

## 2022-07-16 RX ADMIN — AMLODIPINE BESYLATE 5 MG: 5 TABLET ORAL at 09:08

## 2022-07-16 RX ADMIN — TRAZODONE HYDROCHLORIDE 50 MG: 50 TABLET ORAL at 22:07

## 2022-07-16 RX ADMIN — CARVEDILOL 12.5 MG: 12.5 TABLET, FILM COATED ORAL at 09:08

## 2022-07-16 RX ADMIN — OXYCODONE HYDROCHLORIDE AND ACETAMINOPHEN 1 TABLET: 5; 325 TABLET ORAL at 06:40

## 2022-07-16 RX ADMIN — SODIUM CHLORIDE, PRESERVATIVE FREE 10 ML: 5 INJECTION INTRAVENOUS at 09:08

## 2022-07-16 RX ADMIN — CITALOPRAM HYDROBROMIDE 20 MG: 20 TABLET ORAL at 09:08

## 2022-07-16 RX ADMIN — PRAMIPEXOLE DIHYDROCHLORIDE 0.5 MG: 0.25 TABLET ORAL at 20:47

## 2022-07-16 RX ADMIN — APIXABAN 5 MG: 5 TABLET, FILM COATED ORAL at 20:47

## 2022-07-16 RX ADMIN — BUSPIRONE HYDROCHLORIDE 5 MG: 5 TABLET ORAL at 13:26

## 2022-07-16 RX ADMIN — OXYCODONE HYDROCHLORIDE AND ACETAMINOPHEN 1 TABLET: 5; 325 TABLET ORAL at 19:53

## 2022-07-16 ASSESSMENT — PAIN DESCRIPTION - LOCATION
LOCATION: RIB CAGE
LOCATION: LEG
LOCATION: RIB CAGE
LOCATION: RIB CAGE
LOCATION: LEG
LOCATION: RIB CAGE

## 2022-07-16 ASSESSMENT — PAIN SCALES - GENERAL
PAINLEVEL_OUTOF10: 3
PAINLEVEL_OUTOF10: 10
PAINLEVEL_OUTOF10: 10
PAINLEVEL_OUTOF10: 8
PAINLEVEL_OUTOF10: 6
PAINLEVEL_OUTOF10: 8
PAINLEVEL_OUTOF10: 10

## 2022-07-16 ASSESSMENT — PAIN DESCRIPTION - DESCRIPTORS
DESCRIPTORS: SHARP
DESCRIPTORS: ACHING
DESCRIPTORS: SHARP

## 2022-07-16 ASSESSMENT — PAIN DESCRIPTION - ORIENTATION
ORIENTATION: RIGHT

## 2022-07-16 ASSESSMENT — PAIN DESCRIPTION - FREQUENCY
FREQUENCY: INTERMITTENT
FREQUENCY: INTERMITTENT

## 2022-07-16 ASSESSMENT — PAIN DESCRIPTION - PAIN TYPE
TYPE: ACUTE PAIN
TYPE: ACUTE PAIN

## 2022-07-16 ASSESSMENT — PAIN DESCRIPTION - ONSET
ONSET: GRADUAL
ONSET: GRADUAL

## 2022-07-16 NOTE — PROGRESS NOTES
[facility] - Initial Consult Note  Today's Date and Time: 7/16/2022, 11:42 AM    Impression :   RLE Erisepylas  Cholelithiasis. Leukocytosis  Elevated CRP  Recent fall. DM  CKD III  Allergy to Sulfa-Confusion. Recommendations:   Ceftriaxone 1 gm IV daily x 14 days until 7/25/22. Follow CBC and renal function closely. Follow BC. Supportive care. Medical Decision Making/Summary/Discussion:7/16/2022     Pen G is ideal therapy. Not a good choice due to Na levels, pt. Decreased renal function,hx of CHF. Small break in the skin noted to the right lateral calf. Scant amount of serous drainage. Check blood cultures. Infection Control Recommendations   [place] Precautions    Antimicrobial Stewardship Recommendations     Simplification of therapy  Targeted therapy    Coordination of Outpatient Care:   Estimated Length of IV antimicrobials:14 days until 7/25/22  Patient will need Midline Catheter Insertion: Midline placed 7/13/22  Patient will need PICC line Insertion:BD  Patient will need: Home IV , [place],  SNF,  LTAC: TBD  Patient will need outpatient wound care:    Chief complaint/reason for consultation:   Possible RLE Cellulitis      History of Present Illness:   Maria Antonia Santana is a 71y.o.-year-old female who was initially admitted on 7/10/2022. Patient seen at the request of     INITIAL HISTORY:  60-year-old female who presented to the ED s/p fall 2 days ago. Patient states she trying to open a box of food and she slipped and fell. She hit her right side on something, she is not sure what. Angel loss of consciousness. Associated symptoms include left hip pain, pain to her right rib cage and right upper abdomen. No associated symptoms of headache, neck or back pain. She is on blood thinners. CT results reviewed as below. Recent hospitalization 6/5-6/14 for BLE cellulitis.   Treated with IV Vancomycin, discharged on Doxycycline x 7 days.  Patient presents with BLE redness and swelling. Upon examination patient has erysepilas to the RLE, mid-forefoot to mid-thigh. Light pink in color. No open wounds or areas of skin breaks. Patient states she has had this for about 2-weeks. Denies any pain to the RLE. CURRENT EVALUATION 7/16/2022  BP (!) 134/59   Pulse 70   Temp 98.4 °F (36.9 °C)   Resp 17   Ht 5' 3\" (1.6 m)   Wt 192 lb 10.9 oz (87.4 kg)   SpO2 94%   BMI 34.13 kg/m²   Feeling good. Resting comfortably. Denies any fever, chills, n/v/d. C/o rib pain. Small open area to right lateral calf. Scant amount of serous drainage. RLE improving. BLE wrapped in ace wraps. WBC normalized. Discharge planning. Labs, X rays reviewed: 7/16/2022    BUN:44>44>48  Cr:1.26>1.25>1.52>1.40>0.79>0.72>0.73>0.63    WBC:16.0>14.6>11.7>9.5>9.9>8.6>8.8  Hb:  Plat: 295>225>205>310    CRP:166.1    Cultures:  Urine:  7/11 UA-Negative. Blood:  7/12 Negative to date. Sputum :    Wound:    MRSA Nares:      Imaging:  XR HIP 2-3 VW W PELVIS LEFT   Preliminary Result   No acute bony abnormality identified. CT CHEST ABDOMEN PELVIS W CONTRAST   Preliminary Result   No acute traumatic abnormality identified in the chest, abdomen, or pelvis. Subacute mildly displaced proximal sternal body fracture. Age-indeterminate buckle fracture of the right lateral 10th rib. Multiple   old left-sided rib fractures. Distended gallbladder containing a stone at the gallbladder neck. Postsurgical changes in the lower lumbar spine with chronic appearing height   loss of L5.           CT CERVICAL SPINE WO CONTRAST   Final Result   1. Kyphosis of the cervical spine centered at C6-C7. 2. Mild multilevel degenerative changes in the cervical spine primarily at   C5-C6. 3. No acute vertebral body height loss in the cervical spine. 4. Evidence of posterior spinal fusion from C3-C6.        RECOMMENDATIONS:   Unavailable           CT FACIAL BONES WO CONTRAST   Final Result   No acute facial bone trauma. CT HEAD WO CONTRAST   Preliminary Result   No acute intracranial abnormality. 7/11 RUQ US  FINDINGS:   LIVER:  The liver demonstrates normal echogenicity without evidence of   intrahepatic biliary ductal dilatation. There is a paddle pedal flow in the   portal vein. Liver 17 cm in length. BILIARY SYSTEM:  Gallstones in the gallbladder. No wall thickening or   pericholecystic fluid. Common bile duct is within normal limits measuring 5.2 mm. RIGHT KIDNEY: The right kidney is grossly unremarkable without evidence of   hydronephrosis. PANCREAS:  Visualized portions of the pancreas are unremarkable. OTHER: No evidence of right upper quadrant ascites. Impression   Cholelithiasis. No acute findings. RECOMMENDATIONS:   Unavailable                   Discussed with patient, RN. I have personally reviewed the past medical history, past surgical history, medications, social history, and family history, and I have updated the database accordingly. Past Medical History:     Past Medical History:   Diagnosis Date    Allergic rhinitis, cause unspecified     Back pain     lumbar    Bowel obstruction (HCC)     history of due to scar tissue, resolved non-surgically    C. difficile diarrhea     CAD (coronary artery disease)     no stent needed per pt.  Dr. Solange Vital did cath at  2005    Cardiac murmur     Cellulitis     left leg    Cellulitis 2017 August    leg left leg/bug bite    Cerebral artery occlusion with cerebral infarction [facility])     TIA 2014    COVID-19     ONE YR AGO IN 4/25/2020 fever and cough    Diverticulosis of colon (without mention of hemorrhage)     GERD (gastroesophageal reflux disease)     GERD (gastroesophageal reflux disease)     on rx    History of blood transfusion     approx 2020        History of CHF (congestive heart failure)     History of MI (myocardial infarction) 2005    thought due to a blood clot    History of ovarian cyst 1970    had oopherectomy holly    History of peritonitis 1968    due to ruptured appendix age 13    HTN (hypertension)     Hx of blood clots     right leg    Hyperlipidemia     Intestinal or peritoneal adhesions with obstruction (postoperative) (postinfection) ([address]     Kidney infection     renal failure/sepsis/spider bite    Lateral epicondylitis  of elbow     MDRO (multiple drug resistant organisms) resistance     c diff    Muscle strain     right posterior shoulder    Other abnormal glucose     PONV (postoperative nausea and vomiting)     dry heaves    Pre-diabetes     Restless legs syndrome (RLS)     Snores     no cpap    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)     TIA (transient ischemic attack) 2014    Uses walker     Vitamin D deficiency     Wears glasses     Wellness examination     last seen 2 weeks ago       Past Surgical  History:     Past Surgical History:   Procedure Laterality Date    ABDOMEN SURGERY  1976    benign tumor removed near remaining ovary, 1.5 pounds    APPENDECTOMY  1968    appendix ruptured, developed peritonitis    BACK SURGERY      BUNIONECTOMY Left     along with calcium deposits removed    CARDIAC CATHETERIZATION      CARDIAC CATHETERIZATION  2005    negative    CERVICAL FUSION  05/21/2021    POSTERIOR C3-6 LAMINECTOMY, PARTIAL C7 LAMINECTOMY, FUSION C3-C6, SILVERCORD    CERVICAL FUSION N/A 5/21/2021    POSTERIOR C3-6 LAMINECTOMY, PARTIAL C7 LAMINECTOMY, FUSION C3-C6, SILVERCORD performed by Roxi Rosenberg DO at [address] [place] [address]    12 INCHES REMOVED D/T OBSTRUCTION    COLONOSCOPY      CYST REMOVAL Right     right facial    HYSTERECTOMY (CERVIX STATUS UNKNOWN)  1973    taken as a result of recurring cysts    LUMBAR FUSION N/A 02/10/2020    LUMBAR L4-5 POSTERIOR  DECOMPRESSION INSTRUMENTATION FUSION WCEMENT AUGMENTATION/ performed by Lexy Otero MD at [facility] N/A 06/17/2020    L5-S1 PLIF L4-L5 REVISION performed by Rebeca Rudd MD at [address]  2014    FESS    OVARY REMOVAL  1970    UNILATERAL due to cyst    OVARY REMOVAL      partial, due to cyst    SINUS SURGERY      UPPER GASTROINTESTINAL ENDOSCOPY N/A 2019    EGD ESOPHAGOGASTRODUODENOSCOPY performed by Hermila Haskins MD at [facility] N/A 2019    EGD BIOPSY performed by Ora Dakin, MD at [address] [facility] N/A 2019    EGD BIOPSY performed by Hermila Haskins MD at [address] Left 2019    WRIST OPEN REDUCTION INTERNAL FIXATION performed by Arun Ramesh MD at [address]       Medications:      pantoprazole  40 mg Oral QAM AC    amLODIPine  5 mg Oral Daily    carvedilol  12.5 mg Oral BID    cefTRIAXone (ROCEPHIN) IV  1,000 mg IntraVENous Q24H    apixaban  5 mg Oral BID    sodium chloride flush  5-40 mL IntraVENous 2 times per day    atorvastatin  40 mg Oral Nightly    busPIRone  5 mg Oral TID    citalopram  20 mg Oral Daily    ferrous sulfate  325 mg Oral BID    pramipexole  0.5 mg Oral Nightly       Social History:     Social History     Socioeconomic History    Marital status:      Spouse name: Not on file    Number of children: Not on file    Years of education: Not on file    Highest education level: Not on file   Occupational History    Occupation: retired   Tobacco Use    Smoking status: Former     Packs/day: 0.50     Years: 20.00     Pack years: 10.00     Types: Cigarettes     Start date: 1995     Quit date: 2017     Years since quittin.0    Smokeless tobacco: Never   Vaping Use    Vaping Use: Never used   Substance and Sexual Activity    Alcohol use: No     Alcohol/week: 0.0 standard drinks    Drug use: No    Sexual activity: Not on file   Other Topics Concern    Not on file   Social History Narrative    Not on file     Social Determinants of Health Financial Resource Strain: Not on file   Food Insecurity: Not on file   Transportation Needs: Not on file   Physical Activity: Not on file   Stress: Not on file   Social Connections: Not on file   Intimate Partner Violence: Not on file   Housing Stability: Not on file       Family History:     Family History   Problem Relation Age of Onset    Stroke Mother     Diabetes Mother     Heart Disease Mother     High Blood Pressure Mother     Heart Disease Father     Heart Disease Brother     High Blood Pressure Brother     Heart Disease Maternal Grandmother     High Blood Pressure Sister         Allergies:   Bactrim [sulfamethoxazole-trimethoprim], Codeine, Diazepam, Meperidine hcl, and Seasonal     Review of Systems:   Constitutional: No fevers or chills. No systemic complaints  Head: No headaches  Eyes: No double vision or blurry vision. No conjunctival inflammation. R orbit echymotic. ENT: No sore throat or runny nose. Cardiovascular: No chest pain or palpitations. No shortness of breath. No HEND[place]  Lung: No shortness of breath or cough. No sputum production  Abdomen: No nausea, vomiting, diarrhea, or abdominal pain. Cher Stands No cramps. Genitourinary: No increased urinary frequency, or dysuria. No hematuria. No suprapubic or CVA pain  Musculoskeletal:c/o pain to broken rib. Hematologic: No bleeding or bruising. Neurologic: No headache, weakness, numbness, or tingling. Integument: No rash, no ulcers. 2-week hx of redness to RLE. Psychiatric: No depression. Endocrine: No polyuria, no polydipsia, no polyphagia. Physical Examination :     Patient Vitals for the past 8 hrs:   BP Temp Pulse Resp SpO2   07/16/22 0903 (!) 134/59 -- 70 -- --   07/16/22 0622 139/69 98.4 °F (36.9 °C) 76 17 94 %       General Appearance: Awake, alert, and in no apparent distress  Head:  Normocephalic, right orbit ecchymotic from fall.   Eyes: Pupils equal, round, reactive to light and accommodation; extraocular movements intact; sclera anicteric; conjunctivae pink. ENT: Oropharynx clear, without erythema, exudate, or thrush. No tenderness of sinuses. Mouth/throat: mucosa pink and moist.   Neck:Supple, without lymphadenopathy. No JVD, tracheal deviation. Pulmonary/Chest: Clear to auscultation, without wheezes, rales, or rhonchi. Cardiovascular: Regular rate and rhythm without murmurs, rubs, or gallops. Abdomen: Soft, non tender. Bowel sounds normal.   All four Extremities: No cyanosis, clubbing. 1+edema to RLE. Neurologic: No gross sensory or motor deficits. Skin: Warm and dry with good turgor. RLE pink, warm from just above the ankle to just above the knee. Small skin break to right lateral calf. No induration. BLE with ace wraps. Medical Decision Making -Laboratory:   I have independently reviewed/ordered the following labs:    CBC with Differential:   Recent Labs     07/15/22  0541 07/16/22  0653   WBC 8.6 8.8   HGB 10.1* 11.1*   HCT 30.3* 33.2*    310   LYMPHOPCT 16* 18*   MONOPCT 10* 6       BMP:   Recent Labs     07/15/22  0541 07/16/22  0653    143   K 4.0 4.0   * 109*   CO2 23 24   BUN 17 14   CREATININE 0.73 0.63       Hepatic Function Panel:   No results for input(s): PROT, LABALBU, BILIDIR, IBILI, BILITOT, ALKPHOS, ALT, AST in the last 72 hours. No results for input(s): RPR in the last 72 hours. No results for input(s): HIV in the last 72 hours. No results for input(s): BC in the last 72 hours.   Lab Results   Component Value Date/Time    MUCUS NOT REPORTED 08/21/2019 10:00 PM    RBC 3.56 07/16/2022 06:53 AM    TRICHOMONAS NOT REPORTED 08/21/2019 10:00 PM    WBC 8.8 07/16/2022 06:53 AM    YEAST NOT REPORTED 08/21/2019 10:00 PM    TURBIDITY Clear 07/11/2022 06:13 PM     Lab Results   Component Value Date/Time    CREATININE 0.63 07/16/2022 06:53 AM    GLUCOSE 106 07/16/2022 06:53 AM       Medical Decision Making-Imaging:       Medical Decision [id number]:       Medical Decision Making-Other: Note:  Labs, medications, radiologic studies were reviewed with personal review of films  Large amounts of data were reviewed  Discussed with nursing Staff, Discharge planner  Infection Control and Prevention measures reviewed  All prior entries were reviewed  Administer medications as ordered  Prognosis: Good  Discharge planning reviewed  Follow up as outpatient. Thank you for allowing us to participate in the care of this patient. Please call with questions.     Ryanne Joy, MAIK - CNP    Perfect Serve/Office: [phone]

## 2022-07-16 NOTE — CARE COORDINATION
ONGOING DISCHARGE PLANNING NOTE:    Writer reviewed LSW notes, and discharge plan is to discharge to [facility]  Patient denied will need a Peer to Peer done by Monday at [facility] following        Electronically signed by Get Laws RN on 7/16/2022 at 11:41 AM

## 2022-07-16 NOTE — PROGRESS NOTES
[facility] Internal Medicine    Progress Note     7/16/2022    10:33 AM    Name:   Gold Goins  MRN:     [id number]     Acct:      [de-identified]   Room:   2071/2071-01   Day:  0  Admit Date:  7/10/2022 12:48 PM    PCP:   Jordyn King MD  Code Status:  Full Code    Subjective:     C/C:   Chief Complaint   Patient presents with   Ed Curt    Head Injury    Shortness of Breath    Hip Pain     left     Principal Problem:    Fracture of body of sternum, initial encounter for open fracture  Active Problems:    Nondisplaced fracture of sternal end of left clavicle, initial encounter for closed fracture    Erysipelas of right lower extremity    Closed fracture of body of sternum    Calculus of gallbladder without cholecystitis without obstruction    Leukocytosis    Type 2 diabetes mellitus with stage 3 chronic kidney disease ([facility] [address]    Allergy to sulfa drugs    Deep vein thrombosis (DVT) of right lower extremity ([facility]  Resolved Problems:    * No resolved hospital problems.  *      Reports feeling fine  Denies any complaints    Has been afebrile  Blood pressure has been labile  HR stable   On room air    Labs -   Hb 10  Wbc 8.6  Cr 0.73    On rocephin for RLE cellulitis, till 07/25/2022        Pending placement to inpatient rehab     Recent Results (from the past 24 hour(s))   POC Glucose Fingerstick    Collection Time: 07/15/22 11:28 AM   Result Value Ref Range    POC Glucose 125 (H) 65 - 105 mg/dL   CBC with Auto Differential    Collection Time: 07/16/22  6:53 AM   Result Value Ref Range    WBC 8.8 3.5 - 11.0 k/uL    RBC 3.56 (L) 4.0 - 5.2 m/uL    Hemoglobin 11.1 (L) 12.0 - 16.0 g/dL    Hematocrit 33.2 (L) 36 - 46 %    MCV 93.4 80 - 100 fL    MCH 31.3 26 - 34 pg    MCHC 33.5 31 - 37 g/dL    RDW 13.8 11.5 - 14.9 %    Platelets 003 563 - 598 k/uL    MPV 7.4 6.0 - 12.0 fL    Seg Neutrophils 72 (H) 36 - 66 %    Lymphocytes 18 (L) 24 - 44 %    Monocytes 6 1 - 7 %    Eosinophils % 4 0 - 4 % Basophils 0 0 - 2 %    Segs Absolute 6.34 1.3 - 9.1 k/uL    Absolute Lymph # 1.58 1.0 - 4.8 k/uL    Absolute Mono # 0.53 0.1 - 1.3 k/uL    Absolute Eos # 0.35 0.0 - 0.4 k/uL    Basophils Absolute 0.00 0.0 - 0.2 k/uL    Morphology 1+ POLYCHROMASIA    Basic Metabolic Panel    Collection Time: 07/16/22  6:53 AM   Result Value Ref Range    Glucose 106 (H) 70 - 99 mg/dL    BUN 14 8 - 23 mg/dL    CREATININE 0.63 0.50 - 0.90 mg/dL    Calcium 8.6 8.6 - 10.4 mg/dL    Sodium 143 135 - 144 mmol/L    Potassium 4.0 3.7 - 5.3 mmol/L    Chloride 109 (H) 98 - 107 mmol/L    CO2 24 20 - 31 mmol/L    Anion Gap 10 9 - 17 mmol/L    GFR Non-African American >60 >60 mL/min    GFR African American >60 >60 mL/min    GFR Comment           Recent Labs     07/14/22  1108 07/14/22  1620 07/14/22  2002 07/15/22  0617 07/15/22  1128   POCGLU 119* 129* 156* 108* 125*          No results found.           On admission     36-year-old female presenting with a fall which happened 2 days ago  Resulted in head injury to right side of head patient is on blood thinners-CT head negative in ER  Complaining of left hip pain at presentation unclear onset     Imaging in ER also showed right lateral 10th rib fracture, subacute sternal body fracture, multiple old left-sided rib fractures     Medical history includes CAD, CHF, GERD, hypertension, hyperlipidemia, prediabetes, history of recurrent DVT       Review of Systems:     As recorded in HPI          Physical Examination:        Vitals:    07/15/22 1814 07/15/22 2347 07/16/22 0622 07/16/22 0903   BP: (!) 123/48 127/61 139/69 (!) 134/59   Pulse: 79 70 76 70   Resp: 16 17 17    Temp: 98.5 °F (36.9 °C) 98.8 °F (37.1 °C) 98.4 °F (36.9 °C)    TempSrc: Oral      SpO2: 96% 94% 94%    Weight:       Height:           Recent Labs     07/14/22  1620 07/14/22  2002 07/15/22  0617 07/15/22  1128   POCGLU 129* 156* 108* 125*       No intake or output data in the 24 hours ending 07/16/22 1033      General Appearance: alert, well appearing, and in no acute distress  Mental status:   Head:  normocephalic, atraumatic. Eye: no icterus, redness, pupils equal and reactive, extraocular eye movements intact, conjunctiva clear  Ear: normal external ear, no discharge, hearing intact  Nose:  no drainage noted  Mouth: mucous membranes moist  Neck: supple, no carotid bruits, thyroid not palpable  Lungs: Bilateral equal air entry, clear to ausculation, no wheezing, rales or rhonchi, normal effort  Cardiovascular: normal rate, regular rhythm, no murmur, gallop, rub. Abdomen: Soft, nontender, nondistended, normal bowel sounds, no hepatomegaly or splenomegaly  Neurologic: There are no new focal motor or sensory deficits,   Skin: No gross lesions, rashes, bruising or bleeding on exposed skin area  Extremities:  redness and swelling of the right LL  Psych:             Data:     PLabs:    BMP:   Recent Labs     07/15/22  0541 07/16/22  0653    143   K 4.0 4.0   CO2 23 24   BUN 17 14   CREATININE 0.73 0.63   LABGLOM >60 >60   GLUCOSE 101* 106*                   Medications: Allergies:     Allergies   Allergen Reactions    Bactrim [Sulfamethoxazole-Trimethoprim] Other (See Comments)     confusion    Codeine Itching    Diazepam Other (See Comments)    Meperidine Hcl Other (See Comments)    Seasonal        Current Meds:   Scheduled Meds:    pantoprazole  40 mg Oral QAM AC    amLODIPine  5 mg Oral Daily    carvedilol  12.5 mg Oral BID    cefTRIAXone (ROCEPHIN) IV  1,000 mg IntraVENous Q24H    apixaban  5 mg Oral BID    sodium chloride flush  5-40 mL IntraVENous 2 times per day    atorvastatin  40 mg Oral Nightly    busPIRone  5 mg Oral TID    citalopram  20 mg Oral Daily    ferrous sulfate  325 mg Oral BID    pramipexole  0.5 mg Oral Nightly     Continuous Infusions:    sodium chloride      dextrose       PRN Meds: sodium chloride flush, sodium chloride flush, sodium chloride flush, sodium chloride, ondansetron **OR** ondansetron, polyethylene glycol, acetaminophen **OR** acetaminophen, fentanNYL, fentanNYL, oxyCODONE-acetaminophen, melatonin, traZODone, glucose, dextrose bolus **OR** dextrose bolus, glucagon (rDNA), dextrose, ipratropium-albuterol          Assessment:        Primary Problem  Fracture of body of sternum, initial encounter for open fracture    Principal Problem:    Fracture of body of sternum, initial encounter for open fracture  Active Problems:    Nondisplaced fracture of sternal end of left clavicle, initial encounter for closed fracture    Erysipelas of right lower extremity    Closed fracture of body of sternum    Calculus of gallbladder without cholecystitis without obstruction    Leukocytosis    Type 2 diabetes mellitus with stage 3 chronic kidney disease (HCC)    Allergy to sulfa drugs    Deep vein thrombosis (DVT) of right lower extremity ([address]  Resolved Problems:    * No resolved hospital problems. *       Plan:          7/17/22     Improving   No new complaints/symptoms . Surgery planned on 7/18/22  Continue rocephin IV daily  Monitor vitals  Continue norvasc and coreg  continue eliquis  Placement to inpatient rehab in process  Has cellulitis legs   Appeal planned . Patient will not be able to fx at home . Significant leg swelling , pain      .   Hospital Problems             Last Modified POA    * (Principal) Fracture of body of sternum, initial encounter for open fracture 7/10/2022 Yes    Nondisplaced fracture of sternal end of left clavicle, initial encounter for closed fracture 7/10/2022 Yes    Erysipelas of right lower extremity 7/11/2022 Yes    Closed fracture of body of sternum 7/11/2022 Yes    Calculus of gallbladder without cholecystitis without obstruction 7/11/2022 Yes    Leukocytosis 7/11/2022 Yes    Type 2 diabetes mellitus with stage 3 chronic kidney disease ([address] 7/11/2022 Yes    Allergy to sulfa drugs 7/11/2022 Yes    Deep vein thrombosis (DVT) of right lower extremity ([address] 7/11/2022 Yes                      Thanks for consulting us . Will monitor vitals and clinical course , and  Optimize therapy  as needed .            Prudencio Garland MD  7/16/2022

## 2022-07-16 NOTE — PLAN OF CARE
Problem: Pain  Goal: Verbalizes/displays adequate comfort level or baseline comfort level  Outcome: Progressing     Problem: Safety - Adult  Goal: Free from fall injury  Outcome: Progressing     Problem: ABCDS Injury Assessment  Goal: Absence of physical injury  Outcome: Progressing     Problem: Chronic Conditions and Co-morbidities  Goal: Patient's chronic conditions and co-morbidity symptoms are monitored and maintained or improved  Outcome: Progressing     Problem: Skin/Tissue Integrity  Goal: Absence of new skin breakdown  Description: 1. Monitor for areas of redness and/or skin breakdown  2. Assess vascular access sites hourly  3. Every 4-6 hours minimum:  Change oxygen saturation probe site  4. Every 4-6 hours:  If on nasal continuous positive airway pressure, respiratory therapy assess nares and determine need for appliance change or resting period.   Outcome: Progressing

## 2022-07-16 NOTE — PROGRESS NOTES
Physical Therapy  Facility/Department: [facility] MED SURG  Daily Treatment Note  NAME: Yue Cabrera  : 1950  MRN: [id number]    Date of Service: 2022    Discharge Recommendations:  Patient would benefit from continued therapy after discharge, Therapy recommended at discharge        Patient Diagnosis(es): The primary encounter diagnosis was Closed fracture of body of sternum, initial encounter. Diagnoses of Closed fracture of one rib of right side, initial encounter and Contusion of face, initial encounter were also pertinent to this visit. Assessment   Activity Tolerance: Patient tolerated treatment well;Patient limited by pain     Plan    Plan  Plan: 6-7 times per week     Restrictions  Restrictions/Precautions  Restrictions/Precautions: Fall Risk, General Precautions  Required Braces or Orthoses?: No  Implants present? : Metal implants (Spinal Fusion Hardware)  Position Activity Restriction  Other position/activity restrictions: subacute sternum fracture; avoid excessive pushing/pulling/lifting     Subjective    Subjective  Subjective: Pt seated in bedside chair upon arrival with c/o being hot and painful. RN Phylicia perez's tx and pt is agreeable. Orientation  Overall Orientation Status: Within Functional Limits  Orientation Level: Oriented X4     Objective   Bed Mobility Training  Bed Mobility Training: No  Balance  Sitting: With support  Standing: With support  Transfer Training  Transfer Training: Yes (With RW)  Sit to Stand: Contact-guard assistance  Stand to Sit: Minimum assistance  Gait Training  Gait Training: Yes  Gait  Overall Level of Assistance: Contact-guard assistance  Interventions: Safety awareness training;Verbal cues  Speed/Alicia: Slow;Shuffled  Step Length: Left lengthened;Right shortened  Swing Pattern: Right asymmetrical  Stance: Left decreased;Right increased  Gait Abnormalities: Antalgic;Decreased step clearance; Path deviations; Step to gait  Distance (ft): 30 Feet  Assistive Device: Walker, rolling     PT Exercises  A/AROM Exercises: Seated B LE ex's x10 (Pt limited to nausea , RN noified)     Safety Devices  Type of Devices: All fall risk precautions in place;Call light within reach;Gait belt;Patient at risk for falls; Left in chair;Nurse notified       Goals  Short Term Goals  Short term goal 1: pt to demo all transfers with RW and SBA  Short term goal 2: pt to ambulate 48' with RW and SBA   Short term goal 3: pt to demo improved BLE strength by 1/2 MMG to improve safety and ease with mobility  Short term goal 4: pt to perform all bed mobility with CGA-MinAx1 to decrease burden of care  Patient Goals   Patient goals : pt does not verbalize goals    Education  Patient Education  Education Given To:  Staff  Education Provided: Home Exercise Program  Education Provided Comments: Non-resistive ex's  Education Method: Verbal  Barriers to Learning: None  Education Outcome: Verbalized understanding;Demonstrated understanding    Safety measures utilized: Gait belt, Call light within reach, Sitting in chair, Chair alarm, Nurse notified, and Heels offloaded    Therapy Time   Individual Concurrent Group Co-treatment   Time In [phone]         Time Out 2478         Minutes [address], [place]

## 2022-07-16 NOTE — PLAN OF CARE
Problem: Discharge Planning  Goal: Discharge to home or other facility with appropriate resources  7/16/2022 0411 by Clarisse López RN  Outcome: Progressing  7/15/2022 1804 by Carina Pantoja RN  Outcome: Progressing  Flowsheets (Taken 7/15/2022 0845)  Discharge to home or other facility with appropriate resources:   Identify barriers to discharge with patient and caregiver   Identify discharge learning needs (meds, wound care, etc)     Problem: Pain  Goal: Verbalizes/displays adequate comfort level or baseline comfort level  7/16/2022 0411 by Clarisse López RN  Outcome: Progressing  7/15/2022 1804 by Carina Pantoja RN  Outcome: Progressing     Problem: Safety - Adult  Goal: Free from fall injury  7/16/2022 0411 by Clarisse Lóepz RN  Outcome: Progressing  7/15/2022 1804 by Carina Pantoja RN  Outcome: Progressing  Flowsheets (Taken 7/15/2022 1112)  Free From Fall Injury: Instruct family/caregiver on patient safety     Problem: ABCDS Injury Assessment  Goal: Absence of physical injury  7/16/2022 0411 by Clarisse López RN  Outcome: Progressing  7/15/2022 1804 by Carina Pantoja RN  Outcome: Progressing  Flowsheets (Taken 7/15/2022 1112)  Absence of Physical Injury: Implement safety measures based on patient assessment     Problem: Chronic Conditions and Co-morbidities  Goal: Patient's chronic conditions and co-morbidity symptoms are monitored and maintained or improved  7/16/2022 0411 by Clarisse López RN  Outcome: Progressing  7/15/2022 1804 by Carina Pantoja RN  Outcome: Progressing  Flowsheets (Taken 7/15/2022 0845)  Care Plan - Patient's Chronic Conditions and Co-Morbidity Symptoms are Monitored and Maintained or Improved: Monitor and assess patient's chronic conditions and comorbid symptoms for stability, deterioration, or improvement     Problem: Skin/Tissue Integrity  Goal: Absence of new skin breakdown  Description: 1. Monitor for areas of redness and/or skin breakdown  2.   Assess vascular access

## 2022-07-17 PROBLEM — L03.115 BILATERAL CELLULITIS OF LOWER LEG: Status: ACTIVE | Noted: 2022-07-17

## 2022-07-17 PROBLEM — L03.116 BILATERAL CELLULITIS OF LOWER LEG: Status: ACTIVE | Noted: 2022-07-17

## 2022-07-17 LAB
ABSOLUTE EOS #: 0.42 K/UL (ref 0–0.4)
ABSOLUTE LYMPH #: 2.08 K/UL (ref 1–4.8)
ABSOLUTE MONO #: 0.75 K/UL (ref 0.1–1.3)
ANION GAP SERPL CALCULATED.3IONS-SCNC: 9 MMOL/L (ref 9–17)
BASOPHILS # BLD: 0 % (ref 0–2)
BASOPHILS ABSOLUTE: 0 K/UL (ref 0–0.2)
BUN BLDV-MCNC: 11 MG/DL (ref 8–23)
CALCIUM SERPL-MCNC: 8.4 MG/DL (ref 8.6–10.4)
CHLORIDE BLD-SCNC: 107 MMOL/L (ref 98–107)
CO2: 25 MMOL/L (ref 20–31)
CREAT SERPL-MCNC: 0.6 MG/DL (ref 0.5–0.9)
CULTURE: NORMAL
CULTURE: NORMAL
EOSINOPHILS RELATIVE PERCENT: 5 % (ref 0–4)
GFR AFRICAN AMERICAN: >60 ML/MIN
GFR NON-AFRICAN AMERICAN: >60 ML/MIN
GFR SERPL CREATININE-BSD FRML MDRD: ABNORMAL ML/MIN/{1.73_M2}
GLUCOSE BLD-MCNC: 109 MG/DL (ref 65–105)
GLUCOSE BLD-MCNC: 134 MG/DL (ref 65–105)
GLUCOSE BLD-MCNC: 99 MG/DL (ref 70–99)
HCT VFR BLD CALC: 31 % (ref 36–46)
HEMOGLOBIN: 10.4 G/DL (ref 12–16)
LYMPHOCYTES # BLD: 25 % (ref 24–44)
MCH RBC QN AUTO: 31.8 PG (ref 26–34)
MCHC RBC AUTO-ENTMCNC: 33.7 G/DL (ref 31–37)
MCV RBC AUTO: 94.3 FL (ref 80–100)
MONOCYTES # BLD: 9 % (ref 1–7)
MORPHOLOGY: NORMAL
PDW BLD-RTO: 13.6 % (ref 11.5–14.9)
PLATELET # BLD: 370 K/UL (ref 150–450)
PMV BLD AUTO: 6.7 FL (ref 6–12)
POTASSIUM SERPL-SCNC: 4.2 MMOL/L (ref 3.7–5.3)
RBC # BLD: 3.29 M/UL (ref 4–5.2)
SEG NEUTROPHILS: 61 % (ref 36–66)
SEGMENTED NEUTROPHILS ABSOLUTE COUNT: 5.05 K/UL (ref 1.3–9.1)
SODIUM BLD-SCNC: 141 MMOL/L (ref 135–144)
SPECIMEN DESCRIPTION: NORMAL
SPECIMEN DESCRIPTION: NORMAL
WBC # BLD: 8.3 K/UL (ref 3.5–11)

## 2022-07-17 PROCEDURE — 85025 COMPLETE CBC W/AUTO DIFF WBC: CPT

## 2022-07-17 PROCEDURE — 6370000000 HC RX 637 (ALT 250 FOR IP): Performed by: SURGERY

## 2022-07-17 PROCEDURE — 2580000003 HC RX 258: Performed by: SURGERY

## 2022-07-17 PROCEDURE — 6370000000 HC RX 637 (ALT 250 FOR IP)

## 2022-07-17 PROCEDURE — 99232 SBSQ HOSP IP/OBS MODERATE 35: CPT | Performed by: INTERNAL MEDICINE

## 2022-07-17 PROCEDURE — 36415 COLL VENOUS BLD VENIPUNCTURE: CPT

## 2022-07-17 PROCEDURE — G0378 HOSPITAL OBSERVATION PER HR: HCPCS

## 2022-07-17 PROCEDURE — 6370000000 HC RX 637 (ALT 250 FOR IP): Performed by: NURSE PRACTITIONER

## 2022-07-17 PROCEDURE — 6370000000 HC RX 637 (ALT 250 FOR IP): Performed by: INTERNAL MEDICINE

## 2022-07-17 PROCEDURE — 6360000002 HC RX W HCPCS: Performed by: NURSE PRACTITIONER

## 2022-07-17 PROCEDURE — 2580000003 HC RX 258: Performed by: NURSE PRACTITIONER

## 2022-07-17 PROCEDURE — 1200000000 HC SEMI PRIVATE

## 2022-07-17 PROCEDURE — 80048 BASIC METABOLIC PNL TOTAL CA: CPT

## 2022-07-17 PROCEDURE — 82947 ASSAY GLUCOSE BLOOD QUANT: CPT

## 2022-07-17 RX ADMIN — SODIUM CHLORIDE, PRESERVATIVE FREE 10 ML: 5 INJECTION INTRAVENOUS at 21:08

## 2022-07-17 RX ADMIN — BUSPIRONE HYDROCHLORIDE 5 MG: 5 TABLET ORAL at 08:02

## 2022-07-17 RX ADMIN — CARVEDILOL 12.5 MG: 12.5 TABLET, FILM COATED ORAL at 21:08

## 2022-07-17 RX ADMIN — AMLODIPINE BESYLATE 5 MG: 5 TABLET ORAL at 08:01

## 2022-07-17 RX ADMIN — CITALOPRAM HYDROBROMIDE 20 MG: 20 TABLET ORAL at 08:00

## 2022-07-17 RX ADMIN — PANTOPRAZOLE SODIUM 40 MG: 40 TABLET, DELAYED RELEASE ORAL at 06:14

## 2022-07-17 RX ADMIN — FERROUS SULFATE TAB 325 MG (65 MG ELEMENTAL FE) 325 MG: 325 (65 FE) TAB at 08:02

## 2022-07-17 RX ADMIN — BUSPIRONE HYDROCHLORIDE 5 MG: 5 TABLET ORAL at 14:28

## 2022-07-17 RX ADMIN — ATORVASTATIN CALCIUM 40 MG: 40 TABLET, FILM COATED ORAL at 21:08

## 2022-07-17 RX ADMIN — BUSPIRONE HYDROCHLORIDE 5 MG: 5 TABLET ORAL at 21:08

## 2022-07-17 RX ADMIN — OXYCODONE HYDROCHLORIDE AND ACETAMINOPHEN 1 TABLET: 5; 325 TABLET ORAL at 14:28

## 2022-07-17 RX ADMIN — CEFTRIAXONE SODIUM 1000 MG: 1 INJECTION, POWDER, FOR SOLUTION INTRAMUSCULAR; INTRAVENOUS at 14:29

## 2022-07-17 RX ADMIN — PRAMIPEXOLE DIHYDROCHLORIDE 0.5 MG: 0.25 TABLET ORAL at 21:10

## 2022-07-17 RX ADMIN — FERROUS SULFATE TAB 325 MG (65 MG ELEMENTAL FE) 325 MG: 325 (65 FE) TAB at 21:09

## 2022-07-17 RX ADMIN — OXYCODONE HYDROCHLORIDE AND ACETAMINOPHEN 1 TABLET: 5; 325 TABLET ORAL at 08:16

## 2022-07-17 RX ADMIN — APIXABAN 5 MG: 5 TABLET, FILM COATED ORAL at 21:08

## 2022-07-17 RX ADMIN — OXYCODONE HYDROCHLORIDE AND ACETAMINOPHEN 1 TABLET: 5; 325 TABLET ORAL at 02:17

## 2022-07-17 RX ADMIN — SODIUM CHLORIDE, PRESERVATIVE FREE 10 ML: 5 INJECTION INTRAVENOUS at 08:04

## 2022-07-17 RX ADMIN — APIXABAN 5 MG: 5 TABLET, FILM COATED ORAL at 08:02

## 2022-07-17 RX ADMIN — CARVEDILOL 12.5 MG: 12.5 TABLET, FILM COATED ORAL at 08:00

## 2022-07-17 ASSESSMENT — PAIN SCALES - GENERAL
PAINLEVEL_OUTOF10: 6
PAINLEVEL_OUTOF10: 5
PAINLEVEL_OUTOF10: 3
PAINLEVEL_OUTOF10: 6
PAINLEVEL_OUTOF10: 6

## 2022-07-17 ASSESSMENT — PAIN DESCRIPTION - LOCATION
LOCATION: RIB CAGE

## 2022-07-17 ASSESSMENT — PAIN DESCRIPTION - ONSET
ONSET: GRADUAL

## 2022-07-17 ASSESSMENT — PAIN DESCRIPTION - FREQUENCY
FREQUENCY: INTERMITTENT

## 2022-07-17 ASSESSMENT — PAIN DESCRIPTION - ORIENTATION
ORIENTATION: RIGHT

## 2022-07-17 ASSESSMENT — PAIN DESCRIPTION - DESCRIPTORS
DESCRIPTORS: SHARP

## 2022-07-17 ASSESSMENT — PAIN - FUNCTIONAL ASSESSMENT
PAIN_FUNCTIONAL_ASSESSMENT: PREVENTS OR INTERFERES SOME ACTIVE ACTIVITIES AND ADLS

## 2022-07-17 ASSESSMENT — PAIN DESCRIPTION - PAIN TYPE
TYPE: ACUTE PAIN

## 2022-07-17 NOTE — PLAN OF CARE
Problem: Discharge Planning  Goal: Discharge to home or other facility with appropriate resources  7/17/2022 1527 by Mikaela Villatoro RN  Outcome: Progressing  7/17/2022 0330 by Terrell Joaquin RN  Outcome: Progressing     Problem: Pain  Goal: Verbalizes/displays adequate comfort level or baseline comfort level  7/17/2022 1527 by Mikaela Villatoro RN  Outcome: Progressing  Note: Assess the location, characteristics, onset, duration, frequency, quality, and severity of pain. Encourage immediate report of pain. Use appropriate pain scale to rate pain. Manage pain using nonpharmacologic/pharmacologic interventions. 7/17/2022 0330 by Terrell Joaquin RN  Outcome: Progressing     Problem: Safety - Adult  Goal: Free from fall injury  7/17/2022 1527 by Mikaela Villatoro RN  Outcome: Progressing  7/17/2022 0330 by Terrell Joaquin RN  Outcome: Progressing     Problem: ABCDS Injury Assessment  Goal: Absence of physical injury  Outcome: Progressing     Problem: Chronic Conditions and Co-morbidities  Goal: Patient's chronic conditions and co-morbidity symptoms are monitored and maintained or improved  Outcome: Progressing     Problem: Skin/Tissue Integrity  Goal: Absence of new skin breakdown  Description: 1. Monitor for areas of redness and/or skin breakdown  2. Assess vascular access sites hourly  3. Every 4-6 hours minimum:  Change oxygen saturation probe site  4. Every 4-6 hours:  If on nasal continuous positive airway pressure, respiratory therapy assess nares and determine need for appliance change or resting period. Outcome: Progressing  Note: Assess the overall condition of the skin. Check on bony prominences such as the sacrum, trochanters, scapulae, elbows, heels, inner and outer malleolus, inner and outer knees, back of head). Reinforce the importance of turning, mobility, and ambulation.

## 2022-07-17 NOTE — CARE COORDINATION
ONGOING DISCHARGE PLANNING NOTE:    Writer reviewed LSW notes, and discharge plan is to discharge to Rehab   Will need a peer to peer done   Will need IV atb unit 7/25       Electronically signed by Chema Valadez RN on 7/17/2022 at 11:08 AM

## 2022-07-17 NOTE — PROGRESS NOTES
[place] [address] Internal Medicine    Progress Note     7/17/2022    11:28 AM    Name:   Latosha Nettles  MRN:     [id number]     Acct:      [de-identified]   Room:   2071/2071-01  IP Day:  0  Admit Date:  7/10/2022 12:48 PM    PCP:   Shae Zapata MD  Code Status:  Full Code    Subjective:     C/C:   Chief Complaint   Patient presents with   Brooke Getting    Head Injury    Shortness of Breath    Hip Pain     left     Principal Problem:    Fracture of body of sternum, initial encounter for open fracture  Active Problems:    Nondisplaced fracture of sternal end of left clavicle, initial encounter for closed fracture    Erysipelas of right lower extremity    Closed fracture of body of sternum    Calculus of gallbladder without cholecystitis without obstruction    Leukocytosis    Type 2 diabetes mellitus with stage 3 chronic kidney disease ([address]    Allergy to sulfa drugs    Bilateral cellulitis of lower leg    Deep vein thrombosis (DVT) of right lower extremity ([address]  Resolved Problems:    * No resolved hospital problems.  *      Reports feeling fine  Denies any complaints    Has been afebrile  Blood pressure has been labile  HR stable   On room air    Labs -   Hb 10  Wbc 8.6  Cr 0.73    On rocephin for RLE cellulitis, till 07/25/2022        Pending placement to inpatient rehab     Recent Results (from the past 24 hour(s))   CBC with Auto Differential    Collection Time: 07/17/22  6:12 AM   Result Value Ref Range    WBC 8.3 3.5 - 11.0 k/uL    RBC 3.29 (L) 4.0 - 5.2 m/uL    Hemoglobin 10.4 (L) 12.0 - 16.0 g/dL    Hematocrit 31.0 (L) 36 - 46 %    MCV 94.3 80 - 100 fL    MCH 31.8 26 - 34 pg    MCHC 33.7 31 - 37 g/dL    RDW 13.6 11.5 - 14.9 %    Platelets 243 306 - 528 k/uL    MPV 6.7 6.0 - 12.0 fL    Seg Neutrophils 61 36 - 66 %    Lymphocytes 25 24 - 44 %    Monocytes 9 (H) 1 - 7 %    Eosinophils % 5 (H) 0 - 4 %    Basophils 0 0 - 2 %    Segs Absolute 5.05 1.3 - 9.1 k/uL    Absolute Lymph # 2.08 1.0 - 4.8 k/uL    Absolute Mono # 0.75 0.1 - 1.3 k/uL    Absolute Eos # 0.42 (H) 0.0 - 0.4 k/uL    Basophils Absolute 0.00 0.0 - 0.2 k/uL    Morphology Normal    Basic Metabolic Panel    Collection Time: 07/17/22  6:12 AM   Result Value Ref Range    Glucose 99 70 - 99 mg/dL    BUN 11 8 - 23 mg/dL    CREATININE 0.60 0.50 - 0.90 mg/dL    Calcium 8.4 (L) 8.6 - 10.4 mg/dL    Sodium 141 135 - 144 mmol/L    Potassium 4.2 3.7 - 5.3 mmol/L    Chloride 107 98 - 107 mmol/L    CO2 25 20 - 31 mmol/L    Anion Gap 9 9 - 17 mmol/L    GFR Non-African American >60 >60 mL/min    GFR African American >60 >60 mL/min    GFR Comment           Recent Labs     07/14/22  1620 07/14/22  2002 07/15/22  0617 07/15/22  1128   POCGLU 129* 156* 108* 125*          No results found.           On admission     23-year-old female presenting with a fall which happened 2 days ago  Resulted in head injury to right side of head patient is on blood thinners-CT head negative in ER  Complaining of left hip pain at presentation unclear onset     Imaging in ER also showed right lateral 10th rib fracture, subacute sternal body fracture, multiple old left-sided rib fractures     Medical history includes CAD, CHF, GERD, hypertension, hyperlipidemia, prediabetes, history of recurrent DVT       Review of Systems:     As recorded in HPI          Physical Examination:        Vitals:    07/16/22 1309 07/16/22 1828 07/16/22 2046 07/17/22 0630   BP: (!) 136/50 (!) 156/63 (!) 157/74 (!) 171/67   Pulse: 74 70 80 72   Resp: 16 18  16   Temp: 99 °F (37.2 °C) 98.6 °F (37 °C)  98.1 °F (36.7 °C)   TempSrc:       SpO2: 95% 95%  96%   Weight:       Height:           Recent Labs     07/14/22  1620 07/14/22  2002 07/15/22  0617 07/15/22  1128   POCGLU 129* 156* 108* 125*         Intake/Output Summary (Last 24 hours) at 7/17/2022 1128  Last data filed at 7/17/2022 0647  Gross per 24 hour   Intake 180 ml   Output 200 ml   Net -20 ml         General Appearance:  alert, well appearing, and in no acute distress  Mental status:   Head:  normocephalic, atraumatic. Eye: no icterus, redness, pupils equal and reactive, extraocular eye movements intact, conjunctiva clear  Ear: normal external ear, no discharge, hearing intact  Nose:  no drainage noted  Mouth: mucous membranes moist  Neck: supple, no carotid bruits, thyroid not palpable  Lungs: Bilateral equal air entry, clear to ausculation, no wheezing, rales or rhonchi, normal effort  Cardiovascular: normal rate, regular rhythm, no murmur, gallop, rub. Abdomen: Soft, nontender, nondistended, normal bowel sounds, no hepatomegaly or splenomegaly  Neurologic: There are no new focal motor or sensory deficits,   Skin: No gross lesions, rashes, bruising or bleeding on exposed skin area  Extremities:  redness and swelling of the right LL  Psych:             Data:     PLabs:    BMP:   Recent Labs     07/16/22  0653 07/17/22  0612    141   K 4.0 4.2   CO2 24 25   BUN 14 11   CREATININE 0.63 0.60   LABGLOM >60 >60   GLUCOSE 106* 99                   Medications: Allergies:     Allergies   Allergen Reactions    Bactrim [Sulfamethoxazole-Trimethoprim] Other (See Comments)     confusion    Codeine Itching    Diazepam Other (See Comments)    Meperidine Hcl Other (See Comments)    Seasonal        Current Meds:   Scheduled Meds:    pantoprazole  40 mg Oral QAM AC    amLODIPine  5 mg Oral Daily    carvedilol  12.5 mg Oral BID    cefTRIAXone (ROCEPHIN) IV  1,000 mg IntraVENous Q24H    apixaban  5 mg Oral BID    sodium chloride flush  5-40 mL IntraVENous 2 times per day    atorvastatin  40 mg Oral Nightly    busPIRone  5 mg Oral TID    citalopram  20 mg Oral Daily    ferrous sulfate  325 mg Oral BID    pramipexole  0.5 mg Oral Nightly     Continuous Infusions:    sodium chloride      dextrose       PRN Meds: sodium chloride flush, sodium chloride flush, sodium chloride flush, sodium chloride, ondansetron **OR** ondansetron, polyethylene glycol, acetaminophen **OR** acetaminophen, fentanNYL, fentanNYL, oxyCODONE-acetaminophen, melatonin, traZODone, glucose, dextrose bolus **OR** dextrose bolus, glucagon (rDNA), dextrose, ipratropium-albuterol          Assessment:        Primary Problem  Fracture of body of sternum, initial encounter for open fracture    Principal Problem:    Fracture of body of sternum, initial encounter for open fracture  Active Problems:    Nondisplaced fracture of sternal end of left clavicle, initial encounter for closed fracture    Erysipelas of right lower extremity    Closed fracture of body of sternum    Calculus of gallbladder without cholecystitis without obstruction    Leukocytosis    Type 2 diabetes mellitus with stage 3 chronic kidney disease ([address]    Allergy to sulfa drugs    Bilateral cellulitis of lower leg    Deep vein thrombosis (DVT) of right lower extremity ([address]  Resolved Problems:    * No resolved hospital problems. *       Plan:          7/17/22     Improving   No new complaints/symptoms . Surgery planned on 7/18/22  Continue rocephin IV daily  Monitor vitals  Continue norvasc and coreg  continue eliquis  Placement to inpatient rehab in process  Has cellulitis legs   Appeal planned . Patient will not be able to fx at home . Significant leg swelling , pain      .   Hospital Problems             Last Modified POA    * (Principal) Fracture of body of sternum, initial encounter for open fracture 7/10/2022 Yes    Nondisplaced fracture of sternal end of left clavicle, initial encounter for closed fracture 7/10/2022 Yes    Erysipelas of right lower extremity 7/11/2022 Yes    Closed fracture of body of sternum 7/11/2022 Yes    Calculus of gallbladder without cholecystitis without obstruction 7/11/2022 Yes    Leukocytosis 7/11/2022 Yes    Type 2 diabetes mellitus with stage 3 chronic kidney disease ([address] 7/11/2022 Yes    Allergy to sulfa drugs 7/11/2022 Yes    Bilateral cellulitis of lower leg 7/17/2022 Yes    Deep vein thrombosis (DVT) of right lower extremity ([facility] 7/11/2022 Yes                      Thanks for consulting us . Will monitor vitals and clinical course , and  Optimize therapy  as needed .            Lexus Bueno MD  7/17/2022

## 2022-07-18 LAB
ABSOLUTE BANDS #: 0.08 K/UL (ref 0–1)
ABSOLUTE EOS #: 0.08 K/UL (ref 0–0.4)
ABSOLUTE LYMPH #: 1.07 K/UL (ref 1–4.8)
ABSOLUTE MONO #: 0.33 K/UL (ref 0.1–1.3)
ANION GAP SERPL CALCULATED.3IONS-SCNC: 11 MMOL/L (ref 9–17)
BANDS: 1 % (ref 0–10)
BASOPHILS # BLD: 0 % (ref 0–2)
BASOPHILS ABSOLUTE: 0 K/UL (ref 0–0.2)
BUN BLDV-MCNC: 12 MG/DL (ref 8–23)
CALCIUM SERPL-MCNC: 8.3 MG/DL (ref 8.6–10.4)
CHLORIDE BLD-SCNC: 106 MMOL/L (ref 98–107)
CO2: 24 MMOL/L (ref 20–31)
CREAT SERPL-MCNC: 0.59 MG/DL (ref 0.5–0.9)
EOSINOPHILS RELATIVE PERCENT: 1 % (ref 0–4)
GFR AFRICAN AMERICAN: >60 ML/MIN
GFR NON-AFRICAN AMERICAN: >60 ML/MIN
GFR SERPL CREATININE-BSD FRML MDRD: ABNORMAL ML/MIN/{1.73_M2}
GLUCOSE BLD-MCNC: 101 MG/DL (ref 65–105)
GLUCOSE BLD-MCNC: 106 MG/DL (ref 70–99)
GLUCOSE BLD-MCNC: 107 MG/DL (ref 65–105)
GLUCOSE BLD-MCNC: 108 MG/DL (ref 65–105)
GLUCOSE BLD-MCNC: 108 MG/DL (ref 65–105)
GLUCOSE BLD-MCNC: 115 MG/DL (ref 65–105)
GLUCOSE BLD-MCNC: 116 MG/DL (ref 65–105)
GLUCOSE BLD-MCNC: 124 MG/DL (ref 65–105)
GLUCOSE BLD-MCNC: 125 MG/DL (ref 65–105)
GLUCOSE BLD-MCNC: 129 MG/DL (ref 65–105)
GLUCOSE BLD-MCNC: 146 MG/DL (ref 65–105)
GLUCOSE BLD-MCNC: 96 MG/DL (ref 65–105)
GLUCOSE BLD-MCNC: 97 MG/DL (ref 65–105)
HCT VFR BLD CALC: 32 % (ref 36–46)
HEMOGLOBIN: 10.6 G/DL (ref 12–16)
LYMPHOCYTES # BLD: 13 % (ref 24–44)
MCH RBC QN AUTO: 31.4 PG (ref 26–34)
MCHC RBC AUTO-ENTMCNC: 33.3 G/DL (ref 31–37)
MCV RBC AUTO: 94.3 FL (ref 80–100)
MONOCYTES # BLD: 4 % (ref 1–7)
MORPHOLOGY: ABNORMAL
MORPHOLOGY: ABNORMAL
PDW BLD-RTO: 13.7 % (ref 11.5–14.9)
PLATELET # BLD: 444 K/UL (ref 150–450)
PMV BLD AUTO: 6.6 FL (ref 6–12)
POTASSIUM SERPL-SCNC: 4 MMOL/L (ref 3.7–5.3)
RBC # BLD: 3.39 M/UL (ref 4–5.2)
SEG NEUTROPHILS: 81 % (ref 36–66)
SEGMENTED NEUTROPHILS ABSOLUTE COUNT: 6.64 K/UL (ref 1.3–9.1)
SODIUM BLD-SCNC: 141 MMOL/L (ref 135–144)
WBC # BLD: 8.2 K/UL (ref 3.5–11)

## 2022-07-18 PROCEDURE — 36415 COLL VENOUS BLD VENIPUNCTURE: CPT

## 2022-07-18 PROCEDURE — 99231 SBSQ HOSP IP/OBS SF/LOW 25: CPT | Performed by: INTERNAL MEDICINE

## 2022-07-18 PROCEDURE — 99232 SBSQ HOSP IP/OBS MODERATE 35: CPT | Performed by: NURSE PRACTITIONER

## 2022-07-18 PROCEDURE — 6360000002 HC RX W HCPCS: Performed by: NURSE PRACTITIONER

## 2022-07-18 PROCEDURE — 1200000000 HC SEMI PRIVATE

## 2022-07-18 PROCEDURE — 97110 THERAPEUTIC EXERCISES: CPT

## 2022-07-18 PROCEDURE — 6370000000 HC RX 637 (ALT 250 FOR IP): Performed by: INTERNAL MEDICINE

## 2022-07-18 PROCEDURE — 97116 GAIT TRAINING THERAPY: CPT

## 2022-07-18 PROCEDURE — 6370000000 HC RX 637 (ALT 250 FOR IP): Performed by: NURSE PRACTITIONER

## 2022-07-18 PROCEDURE — 2580000003 HC RX 258: Performed by: SURGERY

## 2022-07-18 PROCEDURE — 80048 BASIC METABOLIC PNL TOTAL CA: CPT

## 2022-07-18 PROCEDURE — 2580000003 HC RX 258: Performed by: NURSE PRACTITIONER

## 2022-07-18 PROCEDURE — 6370000000 HC RX 637 (ALT 250 FOR IP)

## 2022-07-18 PROCEDURE — 6370000000 HC RX 637 (ALT 250 FOR IP): Performed by: SURGERY

## 2022-07-18 PROCEDURE — 97535 SELF CARE MNGMENT TRAINING: CPT

## 2022-07-18 PROCEDURE — 85025 COMPLETE CBC W/AUTO DIFF WBC: CPT

## 2022-07-18 RX ORDER — LANOLIN ALCOHOL/MO/W.PET/CERES
CREAM (GRAM) TOPICAL 2 TIMES DAILY
Qty: 1 EACH | Refills: 0 | Status: SHIPPED | OUTPATIENT
Start: 2022-07-18

## 2022-07-18 RX ORDER — LANOLIN ALCOHOL/MO/W.PET/CERES
CREAM (GRAM) TOPICAL 2 TIMES DAILY
Status: DISCONTINUED | OUTPATIENT
Start: 2022-07-18 | End: 2022-07-21 | Stop reason: HOSPADM

## 2022-07-18 RX ORDER — PANTOPRAZOLE SODIUM 40 MG/1
40 TABLET, DELAYED RELEASE ORAL
Qty: 30 TABLET | Refills: 3 | Status: SHIPPED | OUTPATIENT
Start: 2022-07-19 | End: 2022-10-03 | Stop reason: SDUPTHER

## 2022-07-18 RX ADMIN — CARVEDILOL 12.5 MG: 12.5 TABLET, FILM COATED ORAL at 21:21

## 2022-07-18 RX ADMIN — SODIUM CHLORIDE, PRESERVATIVE FREE 10 ML: 5 INJECTION INTRAVENOUS at 08:49

## 2022-07-18 RX ADMIN — OXYCODONE HYDROCHLORIDE AND ACETAMINOPHEN 1 TABLET: 5; 325 TABLET ORAL at 13:10

## 2022-07-18 RX ADMIN — OXYCODONE HYDROCHLORIDE AND ACETAMINOPHEN 1 TABLET: 5; 325 TABLET ORAL at 21:21

## 2022-07-18 RX ADMIN — APIXABAN 5 MG: 5 TABLET, FILM COATED ORAL at 08:44

## 2022-07-18 RX ADMIN — APIXABAN 5 MG: 5 TABLET, FILM COATED ORAL at 21:21

## 2022-07-18 RX ADMIN — Medication: at 15:17

## 2022-07-18 RX ADMIN — BUSPIRONE HYDROCHLORIDE 5 MG: 5 TABLET ORAL at 21:21

## 2022-07-18 RX ADMIN — CARVEDILOL 12.5 MG: 12.5 TABLET, FILM COATED ORAL at 08:44

## 2022-07-18 RX ADMIN — FERROUS SULFATE TAB 325 MG (65 MG ELEMENTAL FE) 325 MG: 325 (65 FE) TAB at 21:36

## 2022-07-18 RX ADMIN — CEFTRIAXONE SODIUM 1000 MG: 1 INJECTION, POWDER, FOR SOLUTION INTRAMUSCULAR; INTRAVENOUS at 15:17

## 2022-07-18 RX ADMIN — TRAZODONE HYDROCHLORIDE 50 MG: 50 TABLET ORAL at 21:21

## 2022-07-18 RX ADMIN — OXYCODONE HYDROCHLORIDE AND ACETAMINOPHEN 1 TABLET: 5; 325 TABLET ORAL at 08:44

## 2022-07-18 RX ADMIN — ATORVASTATIN CALCIUM 40 MG: 40 TABLET, FILM COATED ORAL at 21:21

## 2022-07-18 RX ADMIN — BUSPIRONE HYDROCHLORIDE 5 MG: 5 TABLET ORAL at 08:44

## 2022-07-18 RX ADMIN — CITALOPRAM HYDROBROMIDE 20 MG: 20 TABLET ORAL at 08:44

## 2022-07-18 RX ADMIN — TRAZODONE HYDROCHLORIDE 50 MG: 50 TABLET ORAL at 01:12

## 2022-07-18 RX ADMIN — Medication: at 21:22

## 2022-07-18 RX ADMIN — FERROUS SULFATE TAB 325 MG (65 MG ELEMENTAL FE) 325 MG: 325 (65 FE) TAB at 08:44

## 2022-07-18 RX ADMIN — AMLODIPINE BESYLATE 5 MG: 5 TABLET ORAL at 08:44

## 2022-07-18 RX ADMIN — BUSPIRONE HYDROCHLORIDE 5 MG: 5 TABLET ORAL at 15:17

## 2022-07-18 RX ADMIN — PANTOPRAZOLE SODIUM 40 MG: 40 TABLET, DELAYED RELEASE ORAL at 05:23

## 2022-07-18 RX ADMIN — PRAMIPEXOLE DIHYDROCHLORIDE 0.5 MG: 0.25 TABLET ORAL at 21:21

## 2022-07-18 RX ADMIN — SODIUM CHLORIDE, PRESERVATIVE FREE 10 ML: 5 INJECTION INTRAVENOUS at 21:21

## 2022-07-18 ASSESSMENT — PAIN DESCRIPTION - LOCATION
LOCATION: OTHER (COMMENT)
LOCATION: RIB CAGE
LOCATION: RIB CAGE

## 2022-07-18 ASSESSMENT — PAIN DESCRIPTION - ORIENTATION
ORIENTATION: RIGHT

## 2022-07-18 ASSESSMENT — PAIN SCALES - GENERAL
PAINLEVEL_OUTOF10: 10
PAINLEVEL_OUTOF10: 8
PAINLEVEL_OUTOF10: 10
PAINLEVEL_OUTOF10: 0
PAINLEVEL_OUTOF10: 7
PAINLEVEL_OUTOF10: 6

## 2022-07-18 ASSESSMENT — PAIN - FUNCTIONAL ASSESSMENT
PAIN_FUNCTIONAL_ASSESSMENT: ACTIVITIES ARE NOT PREVENTED

## 2022-07-18 ASSESSMENT — PAIN DESCRIPTION - DESCRIPTORS: DESCRIPTORS: SHARP

## 2022-07-18 NOTE — CARE COORDINATION
AMELIA contacted Kate Johns with [email or web address] to confirm that the appeal was submitted for [facility]. This was submitted on Friday 7/15, and Per Kate Escobar it typically takes 2-3 days.      Electronically signed by Shreya Mazariegos on 7/18/22 at 8:17 AM EDT

## 2022-07-18 NOTE — PROGRESS NOTES
[facility] - Initial Consult Note  Today's Date and Time: 7/18/2022, 2:40 PM    Impression :   RLE Erisepylas  Cholelithiasis. Leukocytosis  Elevated CRP  Recent fall. DM  CKD III  Allergy to Sulfa-Confusion. Recommendations:   Ceftriaxone 1 gm IV daily x 10 days until 7/20/22. Follow CBC and renal function closely. Okay to discharge from ID standpoint. Supportive care. Medical Decision Making/Summary/Discussion:7/18/2022     Pen G is ideal therapy. Not a good choice due to Na levels, pt. Decreased renal function,hx of CHF. Small break in the skin noted to the right lateral calf. Scant amount of serous drainage. Check blood cultures. RLE improving. Shorten antibiotic course to 10 days. Infection Control Recommendations   Bloomer Precautions    Antimicrobial Stewardship Recommendations     Simplification of therapy  Targeted therapy    Coordination of Outpatient Care:   Estimated Length of IV antimicrobials:14 days until 7/25/22  Patient will need Midline Catheter Insertion: Midline placed 7/13/22  Patient will need PICC line Insertion:BD  Patient will need: Home IV , [place],  SNF,  LTAC: TBD  Patient will need outpatient wound care:    Chief complaint/reason for consultation:   Possible RLE Cellulitis      History of Present Illness:   Zeyad Blair is a 71y.o.-year-old female who was initially admitted on 7/10/2022. Patient seen at the request of     INITIAL HISTORY:  67-year-old female who presented to the ED s/p fall 2 days ago. Patient states she trying to open a box of food and she slipped and fell. She hit her right side on something, she is not sure what. Angel loss of consciousness. Associated symptoms include left hip pain, pain to her right rib cage and right upper abdomen. No associated symptoms of headache, neck or back pain. She is on blood thinners. CT results reviewed as below. Recent hospitalization 6/5-6/14 for BLE cellulitis. Treated with IV Vancomycin, discharged on Doxycycline x 7 days. Patient presents with BLE redness and swelling. Upon examination patient has erysepilas to the RLE, mid-forefoot to mid-thigh. Light pink in color. No open wounds or areas of skin breaks. Patient states she has had this for about 2-weeks. Denies any pain to the RLE. CURRENT EVALUATION 7/18/2022  BP (!) 148/71   Pulse 72   Temp 97.9 °F (36.6 °C) (Oral)   Resp 20   Ht 5' 3\" (1.6 m)   Wt 192 lb 10.9 oz (87.4 kg)   SpO2 95%   BMI 34.13 kg/m²   Feeling good. Resting comfortably. Denies any fever, chills, n/v/d.   RLE improved. Small area of pretibial erythema. Labs stable. Labs, X rays reviewed: 7/18/2022    BUN:44>44>48  Cr:1.26>1.25>1.52>1.40>0.79>0.72>0.73>0.63>0.59    WBC:16.0>14.6>11.7>9.5>9.9>8.6>8.8>8.2  Hb:  Plat: 295>225>205>310    CRP:166.1    Cultures:  Urine:  7/11 UA-Negative. Blood:  7/12 Negative to date. Sputum :    Wound:    MRSA Nares:      Imaging:  XR HIP 2-3 VW W PELVIS LEFT   Preliminary Result   No acute bony abnormality identified. CT CHEST ABDOMEN PELVIS W CONTRAST   Preliminary Result   No acute traumatic abnormality identified in the chest, abdomen, or pelvis. Subacute mildly displaced proximal sternal body fracture. Age-indeterminate buckle fracture of the right lateral 10th rib. Multiple   old left-sided rib fractures. Distended gallbladder containing a stone at the gallbladder neck. Postsurgical changes in the lower lumbar spine with chronic appearing height   loss of L5.           CT CERVICAL SPINE WO CONTRAST   Final Result   1. Kyphosis of the cervical spine centered at C6-C7. 2. Mild multilevel degenerative changes in the cervical spine primarily at   C5-C6. 3. No acute vertebral body height loss in the cervical spine. 4. Evidence of posterior spinal fusion from C3-C6.        RECOMMENDATIONS:   Unavailable           CT FACIAL BONES WO CONTRAST Final Result   No acute facial bone trauma. CT HEAD WO CONTRAST   Preliminary Result   No acute intracranial abnormality. 7/11 RUQ US  FINDINGS:   LIVER:  The liver demonstrates normal echogenicity without evidence of   intrahepatic biliary ductal dilatation. There is a paddle pedal flow in the   portal vein. Liver 17 cm in length. BILIARY SYSTEM:  Gallstones in the gallbladder. No wall thickening or   pericholecystic fluid. Common bile duct is within normal limits measuring 5.2 mm. RIGHT KIDNEY: The right kidney is grossly unremarkable without evidence of   hydronephrosis. PANCREAS:  Visualized portions of the pancreas are unremarkable. OTHER: No evidence of right upper quadrant ascites. Impression   Cholelithiasis. No acute findings. RECOMMENDATIONS:   Unavailable                   Discussed with patient, RN. I have personally reviewed the past medical history, past surgical history, medications, social history, and family history, and I have updated the database accordingly. Past Medical History:     Past Medical History:   Diagnosis Date    Allergic rhinitis, cause unspecified     Back pain     lumbar    Bowel obstruction (HCC)     history of due to scar tissue, resolved non-surgically    C. difficile diarrhea     CAD (coronary artery disease)     no stent needed per pt.  Dr. Grecia Lott did cath at  2005    Cardiac murmur     Cellulitis     left leg    Cellulitis 2017 August    leg left leg/bug bite    Cerebral artery occlusion with cerebral infarction [facility])     TIA 2014    COVID-19     ONE YR AGO IN 4/25/2020 fever and cough    Diverticulosis of colon (without mention of hemorrhage)     GERD (gastroesophageal reflux disease)     GERD (gastroesophageal reflux disease)     on rx    History of blood transfusion     approx 2020        History of CHF (congestive heart failure)     History of MI (myocardial infarction) 2005    thought due to a blood clot    History of ovarian cyst 1970    had oopherectomy holly    History of peritonitis 1968    due to ruptured appendix age 13    HTN (hypertension)     Hx of blood clots     right leg    Hyperlipidemia     Intestinal or peritoneal adhesions with obstruction (postoperative) (postinfection) ([address]     Kidney infection     renal failure/sepsis/spider bite    Lateral epicondylitis  of elbow     MDRO (multiple drug resistant organisms) resistance     c diff    Muscle strain     right posterior shoulder    Other abnormal glucose     PONV (postoperative nausea and vomiting)     dry heaves    Pre-diabetes     Restless legs syndrome (RLS)     Snores     no cpap    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)     TIA (transient ischemic attack) 2014    Uses walker     Vitamin D deficiency     Wears glasses     Wellness examination     last seen 2 weeks ago       Past Surgical  History:     Past Surgical History:   Procedure Laterality Date    ABDOMEN SURGERY  1976    benign tumor removed near remaining ovary, 1.5 pounds    APPENDECTOMY  1968    appendix ruptured, developed peritonitis    BACK SURGERY      BUNIONECTOMY Left     along with calcium deposits removed    CARDIAC CATHETERIZATION      CARDIAC CATHETERIZATION  2005    negative    CERVICAL FUSION  05/21/2021    POSTERIOR C3-6 LAMINECTOMY, PARTIAL C7 LAMINECTOMY, FUSION C3-C6, SILVERCORD    CERVICAL FUSION N/A 5/21/2021    POSTERIOR C3-6 LAMINECTOMY, PARTIAL C7 LAMINECTOMY, FUSION C3-C6, SILVERCORD performed by Carlito Cummings DO at [address] [place] [address]    12 INCHES REMOVED D/T OBSTRUCTION    COLONOSCOPY      CYST REMOVAL Right     right facial    HYSTERECTOMY (CERVIX STATUS UNKNOWN)  1973    taken as a result of recurring cysts    LUMBAR FUSION N/A 02/10/2020    LUMBAR L4-5 POSTERIOR  DECOMPRESSION INSTRUMENTATION FUSION WCEMENT AUGMENTATION/ performed by Farhan Anderson MD at [facility] N/A 06/17/2020    L5-S1 PLIF L4-L5 REVISION performed by Tobias Snyder MD at [address]  [facility]  2014    FESS    OVARY REMOVAL  1970    UNILATERAL due to cyst    OVARY REMOVAL      partial, due to cyst    SINUS SURGERY      UPPER GASTROINTESTINAL ENDOSCOPY N/A 2019    EGD ESOPHAGOGASTRODUODENOSCOPY performed by Richie Davies MD at [address] [facility] N/A 2019    EGD BIOPSY performed by Palmira Benedict MD at [address] [place] [address] N/A 2019    EGD BIOPSY performed by Richie Davies MD at [address] Left 2019    WRIST OPEN REDUCTION INTERNAL FIXATION performed by Maria Del Rosario Hale MD at [address] [facility] OR       Medications:      Hydrocerin   Topical BID    pantoprazole  40 mg Oral QAM AC    amLODIPine  5 mg Oral Daily    carvedilol  12.5 mg Oral BID    cefTRIAXone (ROCEPHIN) IV  1,000 mg IntraVENous Q24H    apixaban  5 mg Oral BID    sodium chloride flush  5-40 mL IntraVENous 2 times per day    atorvastatin  40 mg Oral Nightly    busPIRone  5 mg Oral TID    citalopram  20 mg Oral Daily    ferrous sulfate  325 mg Oral BID    pramipexole  0.5 mg Oral Nightly       Social History:     Social History     Socioeconomic History    Marital status:      Spouse name: Not on file    Number of children: Not on file    Years of education: Not on file    Highest education level: Not on file   Occupational History    Occupation: retired   Tobacco Use    Smoking status: Former     Packs/day: 0.50     Years: 20.00     Pack years: 10.00     Types: Cigarettes     Start date: 1995     Quit date: 2017     Years since quittin.0    Smokeless tobacco: Never   Vaping Use    Vaping Use: Never used   Substance and Sexual Activity    Alcohol use: No     Alcohol/week: 0.0 standard drinks    Drug use: No    Sexual activity: Not on file   Other Topics Concern    Not on file   Social History Narrative    Not on file     Social Determinants of Health     Financial Resource Strain: Not on file   Food Insecurity: Not on file   Transportation Needs: Not on file   Physical Activity: Not on file   Stress: Not on file   Social Connections: Not on file   Intimate Partner Violence: Not on file   Housing Stability: Not on file       Family History:     Family History   Problem Relation Age of Onset    Stroke Mother     Diabetes Mother     Heart Disease Mother     High Blood Pressure Mother     Heart Disease Father     Heart Disease Brother     High Blood Pressure Brother     Heart Disease Maternal Grandmother     High Blood Pressure Sister         Allergies:   Bactrim [sulfamethoxazole-trimethoprim], Codeine, Diazepam, Meperidine hcl, and Seasonal     Review of Systems:   Constitutional: No fevers or chills. No systemic complaints  Head: No headaches  Eyes: No double vision or blurry vision. No conjunctival inflammation. R orbit echymotic. ENT: No sore throat or runny nose. Cardiovascular: No chest pain or palpitations. No shortness of breath. No HENDERSON  Lung: No shortness of breath or cough. No sputum production  Abdomen: No nausea, vomiting, diarrhea, or abdominal pain. Vearl Pippins No cramps. Genitourinary: No increased urinary frequency, or dysuria. No hematuria. No suprapubic or CVA pain  Musculoskeletal:c/o pain to broken rib. Hematologic: No bleeding or bruising. Neurologic: No headache, weakness, numbness, or tingling. Integument: No rash, no ulcers. 2-week hx of redness to RLE. Psychiatric: No depression. Endocrine: No polyuria, no polydipsia, no polyphagia. Physical Examination :     Patient Vitals for the past 8 hrs:   BP Temp Temp src Pulse Resp SpO2   07/18/22 1202 (!) 148/71 97.9 °F (36.6 °C) Oral 72 20 95 %   07/18/22 0844 (!) 140/72 -- -- 73 -- --       General Appearance: Awake, alert, and in no apparent distress  Head:  Normocephalic, right orbit ecchymotic from fall.   Eyes: Pupils equal, round, reactive to light and accommodation; extraocular movements intact; sclera anicteric; conjunctivae pink. ENT: Oropharynx clear, without erythema, exudate, or thrush. No tenderness of sinuses. Mouth/throat: mucosa pink and moist.   Neck:Supple, without lymphadenopathy. No JVD, tracheal deviation. Pulmonary/Chest: Clear to auscultation, without wheezes, rales, or rhonchi. Cardiovascular: Regular rate and rhythm without murmurs, rubs, or gallops. Abdomen: Soft, non tender. Bowel sounds normal.   All four Extremities: No cyanosis, clubbing. 1+edema to RLE. Neurologic: No gross sensory or motor deficits. Skin: Warm and dry with good turgor. RLE pink with pretibial erythema. Medical Decision Making -Laboratory:   I have independently reviewed/ordered the following labs:    CBC with Differential:   Recent Labs     07/17/22  0612 07/18/22  0515   WBC 8.3 8.2   HGB 10.4* 10.6*   HCT 31.0* 32.0*    444   LYMPHOPCT 25 13*   MONOPCT 9* 4       BMP:   Recent Labs     07/17/22  0612 07/18/22  0515    141   K 4.2 4.0    106   CO2 25 24   BUN 11 12   CREATININE 0.60 0.59       Hepatic Function Panel:   No results for input(s): PROT, LABALBU, BILIDIR, IBILI, BILITOT, ALKPHOS, ALT, AST in the last 72 hours. No results for input(s): RPR in the last 72 hours. No results for input(s): HIV in the last 72 hours. No results for input(s): BC in the last 72 hours.   Lab Results   Component Value Date/Time    MUCUS NOT REPORTED 08/21/2019 10:00 PM    RBC 3.39 07/18/2022 05:15 AM    TRICHOMONAS NOT REPORTED 08/21/2019 10:00 PM    WBC 8.2 07/18/2022 05:15 AM    YEAST NOT REPORTED 08/21/2019 10:00 PM    TURBIDITY Clear 07/11/2022 06:13 PM     Lab Results   Component Value Date/Time    CREATININE 0.59 07/18/2022 05:15 AM    GLUCOSE 106 07/18/2022 05:15 AM       Medical Decision Making-Imaging:       Medical Decision Tikvno-Mvpurqoa-Yhwry:       Medical Decision Making-Other:     Note:  Labs, medications, radiologic studies were reviewed with personal review of films  Large amounts of data were reviewed  Discussed with nursing Staff, Discharge planner  Infection Control and Prevention measures reviewed  All prior entries were reviewed  Administer medications as ordered  Prognosis: Good  Discharge planning reviewed  Follow up as outpatient. Thank you for allowing us to participate in the care of this patient. Please call with questions.     MAIK Jones - CNP    Perfect Serve/Office: [phone]

## 2022-07-18 NOTE — CARE COORDINATION
SW sent referral to [facility] AND [facility].  Electronically signed by HIOPLITO Parkinson on 7/18/2022 at 4:34 PM

## 2022-07-18 NOTE — PROGRESS NOTES
[place] [address] Internal Medicine    Progress Note     7/18/2022    10:22 AM    Name:   Lina Sherwood  MRN:     [id number]     Acct:      [de-identified]   Room:   2071/2071-01   Day:  1  Admit Date:  7/10/2022 12:48 PM    PCP:   Cinthya Alarcon MD  Code Status:  Full Code    Subjective:     C/C:   Chief Complaint   Patient presents with   Darlys Crome    Head Injury    Shortness of Breath    Hip Pain     left     Principal Problem:    Fracture of body of sternum, initial encounter for open fracture  Active Problems:    Nondisplaced fracture of sternal end of left clavicle, initial encounter for closed fracture    Erysipelas of right lower extremity    Closed fracture of body of sternum    Calculus of gallbladder without cholecystitis without obstruction    Leukocytosis    Type 2 diabetes mellitus with stage 3 chronic kidney disease ([address]    Allergy to sulfa drugs    Bilateral cellulitis of lower leg    Deep vein thrombosis (DVT) of right lower extremity ([address]  Resolved Problems:    * No resolved hospital problems.  *      Reports feeling fine  Denies any complaints    Has been afebrile  Blood pressure has been labile  HR stable   On room air    Labs -   Hb 10  Wbc 8.6  Cr 0.73    On rocephin for RLE cellulitis, till 07/25/2022        Pending placement to inpatient rehab     Recent Results (from the past 24 hour(s))   POC Glucose Fingerstick    Collection Time: 07/17/22 10:48 AM   Result Value Ref Range    POC Glucose 115 (H) 65 - 105 mg/dL   POC Glucose Fingerstick    Collection Time: 07/17/22  4:10 PM   Result Value Ref Range    POC Glucose 109 (H) 65 - 105 mg/dL   POC Glucose Fingerstick    Collection Time: 07/17/22  8:02 PM   Result Value Ref Range    POC Glucose 134 (H) 65 - 105 mg/dL   CBC with Auto Differential    Collection Time: 07/18/22  5:15 AM   Result Value Ref Range    WBC 8.2 3.5 - 11.0 k/uL    RBC 3.39 (L) 4.0 - 5.2 m/uL    Hemoglobin 10.6 (L) 12.0 - 16.0 g/dL    Hematocrit 32.0 (L) 36 - 46 %    MCV 94.3 80 - 100 fL    MCH 31.4 26 - 34 pg    MCHC 33.3 31 - 37 g/dL    RDW 13.7 11.5 - 14.9 %    Platelets 536 115 - 644 k/uL    MPV 6.6 6.0 - 12.0 fL    Seg Neutrophils 81 (H) 36 - 66 %    Lymphocytes 13 (L) 24 - 44 %    Monocytes 4 1 - 7 %    Eosinophils % 1 0 - 4 %    Basophils 0 0 - 2 %    Bands 1 0 - 10 %    Segs Absolute 6.64 1.3 - 9.1 k/uL    Absolute Lymph # 1.07 1.0 - 4.8 k/uL    Absolute Mono # 0.33 0.1 - 1.3 k/uL    Absolute Eos # 0.08 0.0 - 0.4 k/uL    Basophils Absolute 0.00 0.0 - 0.2 k/uL    Absolute Bands # 0.08 0.0 - 1.0 k/uL    Morphology HYPOCHROMIA PRESENT     Morphology ANISOCYTOSIS PRESENT    Basic Metabolic Panel    Collection Time: 07/18/22  5:15 AM   Result Value Ref Range    Glucose 106 (H) 70 - 99 mg/dL    BUN 12 8 - 23 mg/dL    CREATININE 0.59 0.50 - 0.90 mg/dL    Calcium 8.3 (L) 8.6 - 10.4 mg/dL    Sodium 141 135 - 144 mmol/L    Potassium 4.0 3.7 - 5.3 mmol/L    Chloride 106 98 - 107 mmol/L    CO2 24 20 - 31 mmol/L    Anion Gap 11 9 - 17 mmol/L    GFR Non-African American >60 >60 mL/min    GFR African American >60 >60 mL/min    GFR Comment         POC Glucose Fingerstick    Collection Time: 07/18/22  6:12 AM   Result Value Ref Range    POC Glucose 107 (H) 65 - 105 mg/dL     Recent Labs     07/17/22  0632 07/17/22  1048 07/17/22  1610 07/17/22  2002 07/18/22  0612   POCGLU 96 115* 109* 134* 107*          No results found.           On admission     52-year-old female presenting with a fall which happened 2 days ago  Resulted in head injury to right side of head patient is on blood thinners-CT head negative in ER  Complaining of left hip pain at presentation unclear onset     Imaging in ER also showed right lateral 10th rib fracture, subacute sternal body fracture, multiple old left-sided rib fractures     Medical history includes CAD, CHF, GERD, hypertension, hyperlipidemia, prediabetes, history of recurrent DVT       Review of Systems:     As recorded in HPI Physical Examination:        Vitals:    07/17/22 1241 07/17/22 1815 07/18/22 0611 07/18/22 0844   BP: (!) 127/55 134/64 (!) 140/72 (!) 140/72   Pulse: 69 74 73 73   Resp: 18 18 17    Temp: 98.6 °F (37 °C) 99.3 °F (37.4 °C) 97.7 °F (36.5 °C)    TempSrc:       SpO2: 95% 96% 95%    Weight:       Height:           Recent Labs     07/17/22  1048 07/17/22  1610 07/17/22  2002 07/18/22  0612   POCGLU 115* 109* 134* 107*         Intake/Output Summary (Last 24 hours) at 7/18/2022 1022  Last data filed at 7/18/2022 0931  Gross per 24 hour   Intake --   Output 850 ml   Net -850 ml         General Appearance:  alert, well appearing, and in no acute distress  Mental status:   Head:  normocephalic, atraumatic. Eye: no icterus, redness, pupils equal and reactive, extraocular eye movements intact, conjunctiva clear  Ear: normal external ear, no discharge, hearing intact  Nose:  no drainage noted  Mouth: mucous membranes moist  Neck: supple, no carotid bruits, thyroid not palpable  Lungs: Bilateral equal air entry, clear to ausculation, no wheezing, rales or rhonchi, normal effort  Cardiovascular: normal rate, regular rhythm, no murmur, gallop, rub. Abdomen: Soft, nontender, nondistended, normal bowel sounds, no hepatomegaly or splenomegaly  Neurologic: There are no new focal motor or sensory deficits,   Skin: No gross lesions, rashes, bruising or bleeding on exposed skin area  Extremities:  redness and swelling of the right LL  Psych:             Data:     PLabs:    BMP:   Recent Labs     07/17/22  0612 07/18/22  0515    141   K 4.2 4.0   CO2 25 24   BUN 11 12   CREATININE 0.60 0.59   LABGLOM >60 >60   GLUCOSE 99 106*                   Medications: Allergies:     Allergies   Allergen Reactions    Bactrim [Sulfamethoxazole-Trimethoprim] Other (See Comments)     confusion    Codeine Itching    Diazepam Other (See Comments)    Meperidine Hcl Other (See Comments)    Seasonal        Current Meds:   Scheduled Meds: Last Modified POA    * (Principal) Fracture of body of sternum, initial encounter for open fracture 7/10/2022 Yes    Nondisplaced fracture of sternal end of left clavicle, initial encounter for closed fracture 7/10/2022 Yes    Erysipelas of right lower extremity 7/11/2022 Yes    Closed fracture of body of sternum 7/11/2022 Yes    Calculus of gallbladder without cholecystitis without obstruction 7/11/2022 Yes    Leukocytosis 7/11/2022 Yes    Type 2 diabetes mellitus with stage 3 chronic kidney disease ([facility] 7/11/2022 Yes    Allergy to sulfa drugs 7/11/2022 Yes    Bilateral cellulitis of lower leg 7/17/2022 Yes    Deep vein thrombosis (DVT) of right lower extremity ([address] 7/11/2022 Yes        7/18/22    Appeal for placement in progress . No new symptoms  Vitals stable . Cardiopulmonary stable . Continue current rx ,                            Thanks for consulting us . Will monitor vitals and clinical course , and  Optimize therapy  as needed .            Kaylyn Sams MD  7/18/2022

## 2022-07-18 NOTE — PLAN OF CARE
Problem: Discharge Planning  Goal: Discharge to home or other facility with appropriate resources  Outcome: Progressing Towards Goal  Flowsheets (Taken 7/15/2022 0845 by Ori Moss, RN)  Discharge to home or other facility with appropriate resources:   Identify barriers to discharge with patient and caregiver   Identify discharge learning needs (meds, wound care, etc)     Problem: Pain  Goal: Verbalizes/displays adequate comfort level or baseline comfort level  Outcome: Progressing Towards Goal  Flowsheets (Taken 7/13/2022 2150 by Rissa Rajan RN)  Verbalizes/displays adequate comfort level or baseline comfort level:   Encourage patient to monitor pain and request assistance   Assess pain using appropriate pain scale   Administer analgesics based on type and severity of pain and evaluate response     Problem: Safety - Adult  Goal: Free from fall injury  Outcome: Progressing Towards Goal  Flowsheets (Taken 7/15/2022 1112 by Ori Moss RN)  Free From Fall Injury: Instruct family/caregiver on patient safety     Problem: ABCDS Injury Assessment  Goal: Absence of physical injury  Outcome: Progressing Towards Goal  Flowsheets (Taken 7/15/2022 1112 by Ori Moss RN)  Absence of Physical Injury: Implement safety measures based on patient assessment     Problem: Chronic Conditions and Co-morbidities  Goal: Patient's chronic conditions and co-morbidity symptoms are monitored and maintained or improved  Outcome: Progressing Towards Goal  Flowsheets (Taken 7/15/2022 0845 by Ori Moss, RN)  Care Plan - Patient's Chronic Conditions and Co-Morbidity Symptoms are Monitored and Maintained or Improved: Monitor and assess patient's chronic conditions and comorbid symptoms for stability, deterioration, or improvement     Problem: Skin/Tissue Integrity  Goal: Absence of new skin breakdown  Description: 1. Monitor for areas of redness and/or skin breakdown  2. Assess vascular access sites hourly  3.

## 2022-07-18 NOTE — PROGRESS NOTES
Occupational Therapy  Facility/Department: [facility] MED SURG  Daily Treatment Note  NAME: Lizeth Lopez  : 1950  MRN: [id number]    Date of Service: 2022    Discharge Recommendations:  Patient would benefit from continued therapy after discharge  OT Equipment Recommendations  Other: TBD      Patient Diagnosis(es): The primary encounter diagnosis was Closed fracture of body of sternum, initial encounter. Diagnoses of Closed fracture of one rib of right side, initial encounter and Contusion of face, initial encounter were also pertinent to this visit. Assessment    Activity Tolerance: Patient tolerated treatment well;Patient limited by pain  Discharge Recommendations: Patient would benefit from continued therapy after discharge  Other: TBD      Plan   Plan  Times per Week: 4-5  Current Treatment Recommendations: Strengthening;ROM;Endurance training;Functional mobility training;Balance training; Safety education & training;Pain management;Patient/Caregiver education & training;Equipment evaluation, education, & procurement;Positioning;Self-Care / ADL     Restrictions       Subjective   Subjective  Subjective: \"I don't think I'll be trying to pick any boxes up any time soon\"  Pain: 0/10  Pain: 0/10           Objective    Vitals     Bed Mobility Training  Bed Mobility Training: No  Balance  Sitting: Impaired  Sitting - Static: Good (unsupported)  Sitting - Dynamic: Fair (occasional)  Standing: Impaired  Standing - Static: Fair  Standing - Dynamic: Fair  Transfer Training  Transfer Training: Yes  Interventions: Manual cues; Safety awareness training; Tactile cues; Verbal cues; Weight shifting training/pressure relief  Sit to Stand: Minimum assistance;Assist X1;Additional time; Adaptive equipment (from low bedside chair)  Stand to Sit: Assist X1;Additional time; Adaptive equipment;Contact-guard assistance  Stand Pivot Transfers: Assist X1;Additional time; Adaptive equipment;Contact-guard assistance  Gait Training  Gait Training: Yes  Gait  Overall Level of Assistance: Contact-guard assistance (+ 2nd person assist for longer walker, pt fatigues quickly)  Interventions: Weight shifting training/pressure relief;Manual cues; Safety awareness training; Tactile cues; Verbal cues  Speed/Alicia: Slow;Shuffled  Step Length: Right shortened;Left lengthened  Swing Pattern: Left asymmetrical;Right asymmetrical  Stance: Right increased; Left decreased  Gait Abnormalities: Decreased step clearance;Trunk sway increased; Shuffling gait (not completely weight shifting off of right LE causing shorter steps on right and longer stride on left)  Distance (ft): 50 Feet  Assistive Device: Walker, rolling     ADL  Grooming: Setup  Grooming Skilled Clinical Factors: Completed oral care/hygiene while seated upright in bedside chair. OT Exercises  A/AROM Exercises: OT facilitated pt completing AROM exercises with BUE to improve functional use for increased safety and independence with self-care and functional mobility. Pt completed shoulder flexion/extension, elbow flexion/extension and wrist flexion/extension. Pt tolerated well with no pain reported. Patient Education  Education Given To: Patient  Education Provided: Role of Therapy; ADL Adaptive Strategies; Fall Prevention Strategies  Education Method: Verbal  Barriers to Learning: None  Education Outcome: Verbalized understanding;Continued education needed    Goals  Short Term Goals  Time Frame for Short term goals: By discharge  Short Term Goal 1: Pt will perform UB self care with supervision and Good safety  Short Term Goal 2: Pt will perform LB self care with SBA, using appropriate adaptive equipment/adaptive strategies, and Good safety  Short Term Goal 3: Pt will tolerate standing for 3-5 minutes during 1-2 handed functional tasks to improve independence and safety with standing ADLs/IADLs.    Short Term Goal 4: Pt will perform functional transfers/mobility with SBA, using least restrictive device, during self care  Short Term Goal 5: Pt will actively participate in 15+ minutes of therapeutic exercise/functional activity to promote safety and independence with self care and mobility       Therapy Time   Individual Concurrent Group Co-treatment   Time In 1026         Time Out 1052         Minutes 26         Timed Code Treatment Minutes: 15 Minutes       Inder Camera, OTR/L

## 2022-07-18 NOTE — CARE COORDINATION
SW spoke with patients spouse to get a back up plan in case the patient was denied for [facility]. SW was informed by spouse that they would really like an acute rehab versus a snf and would like to try  ARU as a second option. Spouse would not give a SNF option. SW told him that she would follow up when she hears from [facility]. SW was then contacted and informed by [facility] that the appeal was denied. SW attempted to contact spouse for back up choices as ARU would not be appropriate. Spouse did not answer. SW spoke with Patient and informed her that [facility] was denied. Patient enquired if she would be able to go to ARU. SW explained that since it is the same level of care, she would likely be denied for there as well. Patient expressed understanding and stated that she would be in agreement for a SNF. Choices would be 1. [facility], [address] [place] [address]. [facility]. Referrals sent. LSW will follow up.      Electronically signed by Maxx Burnett on 7/18/22 at 4:48 PM EDT

## 2022-07-18 NOTE — PROGRESS NOTES
Physical Therapy  Facility/Department: [facility] MED SURG  Daily Treatment Note  NAME: Amanda Rubio  : 1950  MRN: [id number]    Date of Service: 2022    Discharge Recommendations:  Patient would benefit from continued therapy after discharge, Therapy recommended at discharge        Patient Diagnosis(es): The primary encounter diagnosis was Closed fracture of body of sternum, initial encounter. Diagnoses of Closed fracture of one rib of right side, initial encounter and Contusion of face, initial encounter were also pertinent to this visit. Assessment   Activity Tolerance: Patient tolerated treatment well;Patient limited by pain     Plan    Plan  Plan: 6-7 times per week  Specific Instructions for Next Treatment: BLE strengthening with focus on hip flexors and core; Focused body mechanics training during descent to chair; Progress ambulation distance  Current Treatment Recommendations: Strengthening;ROM;Balance training;Functional mobility training;Transfer training; Endurance training;Gait training;Pain management; Safety education & training; Therapeutic activities     Restrictions  Restrictions/Precautions  Restrictions/Precautions: Fall Risk, General Precautions  Required Braces or Orthoses?: No  Implants present? : Metal implants (Spinal Fusion Hardware)  Position Activity Restriction  Other position/activity restrictions: subacute sternum fracture; avoid excessive pushing/pulling/lifting     Subjective    Subjective  Subjective: Pt sitting in bedside chair smiling upon arrival. Pt reports no pain at this time. Pain: 0/10     Objective   Vitals     Bed Mobility Training  Bed Mobility Training: No  Balance  Sitting: Impaired  Sitting - Static: Good (unsupported)  Sitting - Dynamic: Fair (occasional)  Standing: Impaired  Standing - Static: Fair  Standing - Dynamic: Fair  Transfer Training  Transfer Training: Yes  Interventions: Manual cues; Safety awareness training; Tactile cues; Verbal cues; Weight shifting training/pressure relief  Sit to Stand: Minimum assistance;Assist X1;Additional time; Adaptive equipment (from low bedside chair)  Stand to Sit: Assist X1;Additional time; Adaptive equipment;Contact-guard assistance  Stand Pivot Transfers: Assist X1;Additional time; Adaptive equipment;Contact-guard assistance  Gait Training  Gait Training: Yes  Gait  Overall Level of Assistance: Contact-guard assistance (+ 2nd person assist for longer walker, pt fatigues quickly)  Interventions: Weight shifting training/pressure relief;Manual cues; Safety awareness training; Tactile cues; Verbal cues  Speed/Alicia: Slow;Shuffled  Step Length: Right shortened;Left lengthened  Swing Pattern: Left asymmetrical;Right asymmetrical  Stance: Right increased; Left decreased  Gait Abnormalities: Decreased step clearance;Trunk sway increased; Shuffling gait (not completely weight shifting off of right LE causing shorter steps on right and longer stride on left)  Distance (ft): 50 Feet  Assistive Device: Walker, rolling     PT Exercises  A/AROM Exercises: Seated B LE ex's x15             Goals  Short Term Goals  Short term goal 1: pt to demo all transfers with RW and SBA  Short term goal 2: pt to ambulate 48' with RW and SBA   Short term goal 3: pt to demo improved BLE strength by 1/2 MMG to improve safety and ease with mobility  Short term goal 4: pt to perform all bed mobility with CGA-MinAx1 to decrease burden of care  Patient Goals   Patient goals : pt does not verbalize goals    Education  Patient Education  Education Given To: Patient  Education Provided: Plan of Care;Role of Therapy  Education Method: Verbal  Barriers to Learning: None  Education Outcome: Verbalized understanding;Demonstrated understanding    Therapy Time   Individual Concurrent Group Co-treatment   Time In 1026         Time Out 1052         Minutes [address], [place]

## 2022-07-19 LAB
ABSOLUTE EOS #: 0.32 K/UL (ref 0–0.4)
ABSOLUTE LYMPH #: 1.42 K/UL (ref 1–4.8)
ABSOLUTE MONO #: 0.79 K/UL (ref 0.1–1.3)
ANION GAP SERPL CALCULATED.3IONS-SCNC: 10 MMOL/L (ref 9–17)
BASOPHILS # BLD: 1 % (ref 0–2)
BASOPHILS ABSOLUTE: 0.08 K/UL (ref 0–0.2)
BUN BLDV-MCNC: 14 MG/DL (ref 8–23)
CALCIUM SERPL-MCNC: 8.4 MG/DL (ref 8.6–10.4)
CHLORIDE BLD-SCNC: 105 MMOL/L (ref 98–107)
CO2: 26 MMOL/L (ref 20–31)
CREAT SERPL-MCNC: 0.67 MG/DL (ref 0.5–0.9)
EOSINOPHILS RELATIVE PERCENT: 4 % (ref 0–4)
GFR AFRICAN AMERICAN: >60 ML/MIN
GFR NON-AFRICAN AMERICAN: >60 ML/MIN
GFR SERPL CREATININE-BSD FRML MDRD: ABNORMAL ML/MIN/{1.73_M2}
GLUCOSE BLD-MCNC: 100 MG/DL (ref 65–105)
GLUCOSE BLD-MCNC: 103 MG/DL (ref 65–105)
GLUCOSE BLD-MCNC: 104 MG/DL (ref 65–105)
GLUCOSE BLD-MCNC: 104 MG/DL (ref 70–99)
GLUCOSE BLD-MCNC: 114 MG/DL (ref 65–105)
HCT VFR BLD CALC: 31.3 % (ref 36–46)
HEMOGLOBIN: 10.6 G/DL (ref 12–16)
LYMPHOCYTES # BLD: 18 % (ref 24–44)
MCH RBC QN AUTO: 31.7 PG (ref 26–34)
MCHC RBC AUTO-ENTMCNC: 33.9 G/DL (ref 31–37)
MCV RBC AUTO: 93.5 FL (ref 80–100)
MONOCYTES # BLD: 10 % (ref 1–7)
MORPHOLOGY: ABNORMAL
PDW BLD-RTO: 13.6 % (ref 11.5–14.9)
PLATELET # BLD: 490 K/UL (ref 150–450)
PMV BLD AUTO: 6.4 FL (ref 6–12)
POTASSIUM SERPL-SCNC: 4.1 MMOL/L (ref 3.7–5.3)
RBC # BLD: 3.35 M/UL (ref 4–5.2)
SEG NEUTROPHILS: 67 % (ref 36–66)
SEGMENTED NEUTROPHILS ABSOLUTE COUNT: 5.29 K/UL (ref 1.3–9.1)
SODIUM BLD-SCNC: 141 MMOL/L (ref 135–144)
WBC # BLD: 7.9 K/UL (ref 3.5–11)

## 2022-07-19 PROCEDURE — 82947 ASSAY GLUCOSE BLOOD QUANT: CPT

## 2022-07-19 PROCEDURE — 85025 COMPLETE CBC W/AUTO DIFF WBC: CPT

## 2022-07-19 PROCEDURE — 2580000003 HC RX 258: Performed by: SURGERY

## 2022-07-19 PROCEDURE — 6370000000 HC RX 637 (ALT 250 FOR IP): Performed by: NURSE PRACTITIONER

## 2022-07-19 PROCEDURE — 6370000000 HC RX 637 (ALT 250 FOR IP): Performed by: SURGERY

## 2022-07-19 PROCEDURE — 1200000000 HC SEMI PRIVATE

## 2022-07-19 PROCEDURE — 97110 THERAPEUTIC EXERCISES: CPT

## 2022-07-19 PROCEDURE — 97116 GAIT TRAINING THERAPY: CPT

## 2022-07-19 PROCEDURE — 99232 SBSQ HOSP IP/OBS MODERATE 35: CPT | Performed by: INTERNAL MEDICINE

## 2022-07-19 PROCEDURE — 6370000000 HC RX 637 (ALT 250 FOR IP): Performed by: INTERNAL MEDICINE

## 2022-07-19 PROCEDURE — 2580000003 HC RX 258: Performed by: NURSE PRACTITIONER

## 2022-07-19 PROCEDURE — 99232 SBSQ HOSP IP/OBS MODERATE 35: CPT | Performed by: NURSE PRACTITIONER

## 2022-07-19 PROCEDURE — 6370000000 HC RX 637 (ALT 250 FOR IP)

## 2022-07-19 PROCEDURE — 36415 COLL VENOUS BLD VENIPUNCTURE: CPT

## 2022-07-19 PROCEDURE — 80048 BASIC METABOLIC PNL TOTAL CA: CPT

## 2022-07-19 PROCEDURE — 6360000002 HC RX W HCPCS: Performed by: NURSE PRACTITIONER

## 2022-07-19 RX ORDER — FENTANYL CITRATE 50 UG/ML
25 INJECTION, SOLUTION INTRAMUSCULAR; INTRAVENOUS
Status: DISCONTINUED | OUTPATIENT
Start: 2022-07-19 | End: 2022-07-21 | Stop reason: HOSPADM

## 2022-07-19 RX ORDER — FENTANYL CITRATE 50 UG/ML
50 INJECTION, SOLUTION INTRAMUSCULAR; INTRAVENOUS
Status: DISCONTINUED | OUTPATIENT
Start: 2022-07-19 | End: 2022-07-21 | Stop reason: HOSPADM

## 2022-07-19 RX ADMIN — APIXABAN 5 MG: 5 TABLET, FILM COATED ORAL at 07:59

## 2022-07-19 RX ADMIN — APIXABAN 5 MG: 5 TABLET, FILM COATED ORAL at 20:10

## 2022-07-19 RX ADMIN — FERROUS SULFATE TAB 325 MG (65 MG ELEMENTAL FE) 325 MG: 325 (65 FE) TAB at 20:10

## 2022-07-19 RX ADMIN — CEFTRIAXONE SODIUM 1000 MG: 1 INJECTION, POWDER, FOR SOLUTION INTRAMUSCULAR; INTRAVENOUS at 14:20

## 2022-07-19 RX ADMIN — SODIUM CHLORIDE, PRESERVATIVE FREE 10 ML: 5 INJECTION INTRAVENOUS at 07:59

## 2022-07-19 RX ADMIN — AMLODIPINE BESYLATE 5 MG: 5 TABLET ORAL at 07:59

## 2022-07-19 RX ADMIN — SODIUM CHLORIDE, PRESERVATIVE FREE 10 ML: 5 INJECTION INTRAVENOUS at 20:11

## 2022-07-19 RX ADMIN — PANTOPRAZOLE SODIUM 40 MG: 40 TABLET, DELAYED RELEASE ORAL at 06:36

## 2022-07-19 RX ADMIN — FERROUS SULFATE TAB 325 MG (65 MG ELEMENTAL FE) 325 MG: 325 (65 FE) TAB at 07:59

## 2022-07-19 RX ADMIN — CITALOPRAM HYDROBROMIDE 20 MG: 20 TABLET ORAL at 07:59

## 2022-07-19 RX ADMIN — CARVEDILOL 12.5 MG: 12.5 TABLET, FILM COATED ORAL at 07:59

## 2022-07-19 RX ADMIN — Medication: at 08:22

## 2022-07-19 RX ADMIN — OXYCODONE HYDROCHLORIDE AND ACETAMINOPHEN 1 TABLET: 5; 325 TABLET ORAL at 15:49

## 2022-07-19 RX ADMIN — BUSPIRONE HYDROCHLORIDE 5 MG: 5 TABLET ORAL at 07:59

## 2022-07-19 RX ADMIN — OXYCODONE HYDROCHLORIDE AND ACETAMINOPHEN 1 TABLET: 5; 325 TABLET ORAL at 10:23

## 2022-07-19 RX ADMIN — BUSPIRONE HYDROCHLORIDE 5 MG: 5 TABLET ORAL at 20:10

## 2022-07-19 RX ADMIN — PRAMIPEXOLE DIHYDROCHLORIDE 0.5 MG: 0.25 TABLET ORAL at 20:11

## 2022-07-19 RX ADMIN — OXYCODONE HYDROCHLORIDE AND ACETAMINOPHEN 1 TABLET: 5; 325 TABLET ORAL at 20:10

## 2022-07-19 RX ADMIN — BUSPIRONE HYDROCHLORIDE 5 MG: 5 TABLET ORAL at 14:20

## 2022-07-19 RX ADMIN — CARVEDILOL 12.5 MG: 12.5 TABLET, FILM COATED ORAL at 20:10

## 2022-07-19 RX ADMIN — ATORVASTATIN CALCIUM 40 MG: 40 TABLET, FILM COATED ORAL at 20:10

## 2022-07-19 RX ADMIN — TRAZODONE HYDROCHLORIDE 50 MG: 50 TABLET ORAL at 20:10

## 2022-07-19 RX ADMIN — Medication: at 20:11

## 2022-07-19 RX ADMIN — OXYCODONE HYDROCHLORIDE AND ACETAMINOPHEN 1 TABLET: 5; 325 TABLET ORAL at 04:19

## 2022-07-19 ASSESSMENT — PAIN SCALES - GENERAL
PAINLEVEL_OUTOF10: 5
PAINLEVEL_OUTOF10: 10
PAINLEVEL_OUTOF10: 10
PAINLEVEL_OUTOF10: 6
PAINLEVEL_OUTOF10: 8
PAINLEVEL_OUTOF10: 6
PAINLEVEL_OUTOF10: 8

## 2022-07-19 NOTE — PROGRESS NOTES
[facility] - Initial Consult Note  Today's Date and Time: 7/19/2022, 2:04 PM    Impression :   RLE Erisepylas  Cholelithiasis. Leukocytosis  Elevated CRP  Recent fall. DM  CKD III  Allergy to Sulfa-Confusion. Recommendations:   Ceftriaxone 1 gm IV daily x 10 days until 7/20/22. Follow CBC and renal function closely. Okay to discharge from ID standpoint. Supportive care. Medical Decision Making/Summary/Discussion:7/19/2022     Pen G is ideal therapy. Not a good choice due to Na levels, pt. Decreased renal function,hx of CHF. Small break in the skin noted to the right lateral calf. Scant amount of serous drainage. Check blood cultures. RLE improving. Shorten antibiotic course to 10 days. Infection Control Recommendations   [place] Precautions    Antimicrobial Stewardship Recommendations     Simplification of therapy  Targeted therapy    Coordination of Outpatient Care:   Estimated Length of IV antimicrobials:10 days until 7/20/22  Patient will need Midline Catheter Insertion: Midline placed 7/13/22  Patient will need PICC line Insertion:BD  Patient will need: Home IV , [facility],  SNF,  LTAC: TBD  Patient will need outpatient wound care:    Chief complaint/reason for consultation:   Possible RLE Cellulitis      History of Present Illness:   Yamileth Guthrie is a 71y.o.-year-old female who was initially admitted on 7/10/2022. Patient seen at the request of     INITIAL HISTORY:  25-year-old female who presented to the ED s/p fall 2 days ago. Patient states she trying to open a box of food and she slipped and fell. She hit her right side on something, she is not sure what. Angel loss of consciousness. Associated symptoms include left hip pain, pain to her right rib cage and right upper abdomen. No associated symptoms of headache, neck or back pain. She is on blood thinners. CT results reviewed as below. Recent hospitalization 6/5-6/14 for BLE cellulitis. Treated with IV Vancomycin, discharged on Doxycycline x 7 days. Patient presents with BLE redness and swelling. Upon examination patient has erysepilas to the RLE, mid-forefoot to mid-thigh. Light pink in color. No open wounds or areas of skin breaks. Patient states she has had this for about 2-weeks. Denies any pain to the RLE. CURRENT EVALUATION 7/19/2022  /89   Pulse 73   Temp 98.7 °F (37.1 °C)   Resp 17   Ht 5' 3\" (1.6 m)   Wt 192 lb 10.9 oz (87.4 kg)   SpO2 99%   BMI 34.13 kg/m²   Feeling good. Resting comfortably. Denies any fever, chills, n/v/d.   RLE improved. Labs stable. Labs, X rays reviewed: 7/19/2022    BUN:44>44>48  Cr:1.26>1.25>1.52>1.40>0.79>0.72>0.73>0.63>0.59>0.67    WBC:16.0>14.6>11.7>9.5>9.9>8.6>8.8>8.2>7.9  Hb:  Plat: 295>225>205>310>490    CRP:166.1>63.4    Cultures:  Urine:  7/11 UA-Negative. Blood:  7/12 Negative to date. Sputum :    Wound:    MRSA Nares:      Imaging:  XR HIP 2-3 VW W PELVIS LEFT   Preliminary Result   No acute bony abnormality identified. CT CHEST ABDOMEN PELVIS W CONTRAST   Preliminary Result   No acute traumatic abnormality identified in the chest, abdomen, or pelvis. Subacute mildly displaced proximal sternal body fracture. Age-indeterminate buckle fracture of the right lateral 10th rib. Multiple   old left-sided rib fractures. Distended gallbladder containing a stone at the gallbladder neck. Postsurgical changes in the lower lumbar spine with chronic appearing height   loss of L5.           CT CERVICAL SPINE WO CONTRAST   Final Result   1. Kyphosis of the cervical spine centered at C6-C7. 2. Mild multilevel degenerative changes in the cervical spine primarily at   C5-C6. 3. No acute vertebral body height loss in the cervical spine. 4. Evidence of posterior spinal fusion from C3-C6.        RECOMMENDATIONS:   Unavailable           CT FACIAL BONES WO CONTRAST   Final Result   No acute facial bone trauma. CT HEAD WO CONTRAST   Preliminary Result   No acute intracranial abnormality. 7/11 RUQ US  FINDINGS:   LIVER:  The liver demonstrates normal echogenicity without evidence of   intrahepatic biliary ductal dilatation. There is a paddle pedal flow in the   portal vein. Liver 17 cm in length. BILIARY SYSTEM:  Gallstones in the gallbladder. No wall thickening or   pericholecystic fluid. Common bile duct is within normal limits measuring 5.2 mm. RIGHT KIDNEY: The right kidney is grossly unremarkable without evidence of   hydronephrosis. PANCREAS:  Visualized portions of the pancreas are unremarkable. OTHER: No evidence of right upper quadrant ascites. Impression   Cholelithiasis. No acute findings. RECOMMENDATIONS:   Unavailable                   Discussed with patient, RN. I have personally reviewed the past medical history, past surgical history, medications, social history, and family history, and I have updated the database accordingly. Past Medical History:     Past Medical History:   Diagnosis Date    Allergic rhinitis, cause unspecified     Back pain     lumbar    Bowel obstruction (HCC)     history of due to scar tissue, resolved non-surgically    C. difficile diarrhea     CAD (coronary artery disease)     no stent needed per pt.  Dr. Felicia Arce did cath at  2005    Cardiac murmur     Cellulitis     left leg    Cellulitis 2017 August    leg left leg/bug bite    Cerebral artery occlusion with cerebral infarction [facility])     TIA 2014    COVID-19     ONE YR AGO IN 4/25/2020 fever and cough    Diverticulosis of colon (without mention of hemorrhage)     GERD (gastroesophageal reflux disease)     GERD (gastroesophageal reflux disease)     on rx    History of blood transfusion     approx 2020        History of CHF (congestive heart failure)     History of MI (myocardial infarction) 2005    thought due to a blood clot    History of ovarian cyst 1970    had oopherectomy holly    History of peritonitis 1968    due to ruptured appendix age 13    HTN (hypertension)     Hx of blood clots     right leg    Hyperlipidemia     Intestinal or peritoneal adhesions with obstruction (postoperative) (postinfection) ([address]     Kidney infection     renal failure/sepsis/spider bite    Lateral epicondylitis  of elbow     MDRO (multiple drug resistant organisms) resistance     c diff    Muscle strain     right posterior shoulder    Other abnormal glucose     PONV (postoperative nausea and vomiting)     dry heaves    Pre-diabetes     Restless legs syndrome (RLS)     Snores     no cpap    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)     TIA (transient ischemic attack) 2014    Uses walker     Vitamin D deficiency     Wears glasses     Wellness examination     last seen 2 weeks ago       Past Surgical  History:     Past Surgical History:   Procedure Laterality Date    ABDOMEN SURGERY  1976    benign tumor removed near remaining ovary, 1.5 pounds    APPENDECTOMY  1968    appendix ruptured, developed peritonitis    BACK SURGERY      BUNIONECTOMY Left     along with calcium deposits removed    CARDIAC CATHETERIZATION      CARDIAC CATHETERIZATION  2005    negative    CERVICAL FUSION  05/21/2021    POSTERIOR C3-6 LAMINECTOMY, PARTIAL C7 LAMINECTOMY, FUSION C3-C6, SILVERCORD    CERVICAL FUSION N/A 5/21/2021    POSTERIOR C3-6 LAMINECTOMY, PARTIAL C7 LAMINECTOMY, FUSION C3-C6, SILVERCORD performed by Dorie Felder DO at [address] [place] [address]    12 INCHES REMOVED D/T OBSTRUCTION    COLONOSCOPY      CYST REMOVAL Right     right facial    HYSTERECTOMY (CERVIX STATUS UNKNOWN)  1973    taken as a result of recurring cysts    LUMBAR FUSION N/A 02/10/2020    LUMBAR L4-5 POSTERIOR  DECOMPRESSION INSTRUMENTATION FUSION WCEMENT AUGMENTATION/ performed by Jose Rosales MD at [facility] N/A 06/17/2020    L5-S1 PLIF L4-L5 REVISION performed by Jose Rosales MD at [address] [place] [address] Jean-Claude Mg OTHER SURGICAL HISTORY  2014    FESS    OVARY REMOVAL  1970    UNILATERAL due to cyst    OVARY REMOVAL      partial, due to cyst    SINUS SURGERY      UPPER GASTROINTESTINAL ENDOSCOPY N/A 2019    EGD ESOPHAGOGASTRODUODENOSCOPY performed by Rosy Esquivel MD at [address] [facility] N/A 2019    EGD BIOPSY performed by Libby Mora MD at [address] [facility] N/A 2019    EGD BIOPSY performed by Rosy Esquivel MD at [address] Left 2019    WRIST OPEN REDUCTION INTERNAL FIXATION performed by Toya Hill MD at [address] [facility] OR       Medications:      Hydrocerin   Topical BID    pantoprazole  40 mg Oral QAM AC    amLODIPine  5 mg Oral Daily    carvedilol  12.5 mg Oral BID    cefTRIAXone (ROCEPHIN) IV  1,000 mg IntraVENous Q24H    apixaban  5 mg Oral BID    sodium chloride flush  5-40 mL IntraVENous 2 times per day    atorvastatin  40 mg Oral Nightly    busPIRone  5 mg Oral TID    citalopram  20 mg Oral Daily    ferrous sulfate  325 mg Oral BID    pramipexole  0.5 mg Oral Nightly       Social History:     Social History     Socioeconomic History    Marital status:      Spouse name: Not on file    Number of children: Not on file    Years of education: Not on file    Highest education level: Not on file   Occupational History    Occupation: retired   Tobacco Use    Smoking status: Former     Packs/day: 0.50     Years: 20.00     Pack years: 10.00     Types: Cigarettes     Start date: 1995     Quit date: 2017     Years since quittin.0    Smokeless tobacco: Never   Vaping Use    Vaping Use: Never used   Substance and Sexual Activity    Alcohol use: No     Alcohol/week: 0.0 standard drinks    Drug use: No    Sexual activity: Not on file   Other Topics Concern    Not on file   Social History Narrative    Not on file     Social Determinants of Health     Financial Resource Strain: Not on file   Food Insecurity: Not on file   Transportation Needs: Not on file   Physical Activity: Not on file   Stress: Not on file   Social Connections: Not on file   Intimate Partner Violence: Not on file   Housing Stability: Not on file       Family History:     Family History   Problem Relation Age of Onset    Stroke Mother     Diabetes Mother     Heart Disease Mother     High Blood Pressure Mother     Heart Disease Father     Heart Disease Brother     High Blood Pressure Brother     Heart Disease Maternal Grandmother     High Blood Pressure Sister         Allergies:   Bactrim [sulfamethoxazole-trimethoprim], Codeine, Diazepam, Meperidine hcl, and Seasonal     Review of Systems:   Constitutional: No fevers or chills. No systemic complaints  Head: No headaches  Eyes: No double vision or blurry vision. No conjunctival inflammation. R orbit echymotic. ENT: No sore throat or runny nose. Cardiovascular: No chest pain or palpitations. No shortness of breath. No HENDERSON  Lung: No shortness of breath or cough. No sputum production  Abdomen: No nausea, vomiting, diarrhea, or abdominal pain. [place] No cramps. Genitourinary: No increased urinary frequency, or dysuria. No hematuria. No suprapubic or CVA pain  Musculoskeletal:c/o pain to broken rib. Hematologic: No bleeding or bruising. Neurologic: No headache, weakness, numbness, or tingling. Integument: No rash, no ulcers. 2-week hx of redness to RLE. Psychiatric: No depression. Endocrine: No polyuria, no polydipsia, no polyphagia. Physical Examination :     Patient Vitals for the past 8 hrs:   BP Temp Pulse Resp SpO2   07/19/22 1255 137/89 98.7 °F (37.1 °C) 73 17 99 %   07/19/22 0930 (!) 153/64 -- 74 -- --   07/19/22 0625 (!) 171/69 98.4 °F (36.9 °C) 73 18 96 %       General Appearance: Awake, alert, and in no apparent distress  Head:  Normocephalic, right orbit ecchymotic from fall.   Eyes: Pupils equal, round, reactive to light and accommodation; extraocular movements intact; sclera anicteric; conjunctivae pink. ENT: Oropharynx clear, without erythema, exudate, or thrush. No tenderness of sinuses. Mouth/throat: mucosa pink and moist.   Neck:Supple, without lymphadenopathy. No JVD, tracheal deviation. Pulmonary/Chest: Clear to auscultation, without wheezes, rales, or rhonchi. Cardiovascular: Regular rate and rhythm without murmurs, rubs, or gallops. Abdomen: Soft, non tender. Bowel sounds normal.   All four Extremities: No cyanosis, clubbing. 1+edema to RLE. Neurologic: No gross sensory or motor deficits. Skin: Warm and dry with good turgor. RLE pink with pretibial erythema. Medical Decision Making -Laboratory:   I have independently reviewed/ordered the following labs:    CBC with Differential:   Recent Labs     07/18/22  0515 07/19/22  0539   WBC 8.2 7.9   HGB 10.6* 10.6*   HCT 32.0* 31.3*    490*   LYMPHOPCT 13* 18*   MONOPCT 4 10*       BMP:   Recent Labs     07/18/22  0515 07/19/22  0539    141   K 4.0 4.1    105   CO2 24 26   BUN 12 14   CREATININE 0.59 0.67       Hepatic Function Panel:   No results for input(s): PROT, LABALBU, BILIDIR, IBILI, BILITOT, ALKPHOS, ALT, AST in the last 72 hours. No results for input(s): RPR in the last 72 hours. No results for input(s): HIV in the last 72 hours. No results for input(s): BC in the last 72 hours.   Lab Results   Component Value Date/Time    MUCUS NOT REPORTED 08/21/2019 10:00 PM    RBC 3.35 07/19/2022 05:39 AM    TRICHOMONAS NOT REPORTED 08/21/2019 10:00 PM    WBC 7.9 07/19/2022 05:39 AM    YEAST NOT REPORTED 08/21/2019 10:00 PM    TURBIDITY Clear 07/11/2022 06:13 PM     Lab Results   Component Value Date/Time    CREATININE 0.67 07/19/2022 05:39 AM    GLUCOSE 104 07/19/2022 05:39 AM       Medical Decision Making-Imaging:       Medical Decision [id number]:       Medical Decision Making-Other:     Note:  Labs, medications, radiologic studies were reviewed with personal review of films  Large amounts of data were reviewed  Discussed with nursing Staff, Discharge planner  Infection Control and Prevention measures reviewed  All prior entries were reviewed  Administer medications as ordered  Prognosis: Good  Discharge planning reviewed  Follow up as outpatient. Thank you for allowing us to participate in the care of this patient. Please call with questions.     MAIK Mckee - CNP    Perfect Serve/Office: [phone]

## 2022-07-19 NOTE — CARE COORDINATION
AMELIA spoke with patient and informed her that [facility] is OON, and that [facility] could accept but that the [place] location is OON. An [place] facility was patient's second choice, and [facility] that [email or web address] Patient denied any questions or concerns at this time. SW encouraged patient to ask for  if she has any that arise. AMELIA informed [facility] of Patient's agreement to go to their facility. They are starting the Pre-cert today 4/65/18.      Electronically signed by Roseann Hendricks on 7/19/22 at 9:37 AM EDT

## 2022-07-19 NOTE — PROGRESS NOTES
Physical Therapy  Facility/Department: [facility] MED SURG  Daily Treatment Note  NAME: Nichole Martell  : 1950  MRN: [id number]    Date of Service: 2022    Discharge Recommendations:  Patient would benefit from continued therapy after discharge, Therapy recommended at discharge        Patient Diagnosis(es): The primary encounter diagnosis was Closed fracture of body of sternum, initial encounter. Diagnoses of Closed fracture of one rib of right side, initial encounter and Contusion of face, initial encounter were also pertinent to this visit. Assessment   Activity Tolerance: Patient tolerated treatment well;Patient limited by endurance     Plan    Plan  Plan: 6-7 times per week  Specific Instructions for Next Treatment: BLE strengthening with focus on hip flexors and core; Focused body mechanics training during descent to chair; Progress ambulation distance  Current Treatment Recommendations: Strengthening;ROM;Balance training;Functional mobility training;Transfer training; Endurance training;Gait training;Pain management; Safety education & training; Therapeutic activities     Restrictions  Restrictions/Precautions  Restrictions/Precautions: Fall Risk, General Precautions  Required Braces or Orthoses?: No  Implants present? : Metal implants (Spinal Fusion Hardware)  Position Activity Restriction  Other position/activity restrictions: subacute sternum fracture; avoid excessive pushing/pulling/lifting     Subjective    Subjective  Subjective: Pt in bed, semifowlers position on arrival. Pt is pleasant and agreeable to treatment  Pain: 0/10 at rest, 5/10 in R side of ribs with activity  Orientation  Overall Orientation Status: Within Functional Limits  Orientation Level: Oriented X4  Cognition  Overall Cognitive Status: Exceptions  Arousal/Alertness: Appropriate responses to stimuli  Following Commands:  Follows one step commands with increased time  Attention Span: Appears intact  Memory: Appears intact  Safety Judgement: Decreased awareness of need for safety  Problem Solving: Assistance required to identify errors made;Assistance required to correct errors made  Insights: Decreased awareness of deficits  Initiation: Requires cues for some  Sequencing: Requires cues for some     Objective   Vitals     Bed Mobility Training  Bed Mobility Training: Yes  Overall Level of Assistance: Additional time; Moderate assistance  Interventions: Verbal cues; Tactile cues  Rolling: Stand-by assistance  Supine to Sit: Moderate assistance  Sit to Supine: Moderate assistance  Scooting: Moderate assistance  Balance  Sitting: With support  Sitting - Static: Other (comment) (1 UE support on bed rail)  Sitting - Dynamic: Other (comment) (1 UE support on bed rail)  Standing: Impaired  Standing - Static: Fair  Standing - Dynamic: Fair  Transfer Training  Transfer Training: Yes  Overall Level of Assistance: Minimum assistance; Adaptive equipment;Assist X1  Interventions: Verbal cues; Safety awareness training;Manual cues  Sit to Stand: Minimum assistance; Adaptive equipment;Assist X1  Stand to Sit: Contact-guard assistance;Assist X1  Gait Training  Gait Training: Yes  Gait  Overall Level of Assistance: Contact-guard assistance;Minimum assistance;Assist X1;Additional time (CGA increasing to Marilyn with fatigue)  Interventions: Verbal cues; Safety awareness training;Weight shifting training/pressure relief;Manual cues  Speed/Alicia: Slow;Shuffled  Step Length: Right shortened;Left lengthened  Swing Pattern: Left asymmetrical;Right asymmetrical  Stance: Right increased; Left decreased  Gait Abnormalities: Decreased step clearance;Trunk sway increased; Shuffling gait  Distance (ft): 40 Feet (distance limited d/t fatigue)  Assistive Device: Walker, rolling     PT Exercises  A/AROM Exercises: Seated B LE ex's x10- AROM and orange tband used             Goals  Short Term Goals  Short term goal 1: pt to demo all transfers with RW and SBA  Short term goal 2: pt to

## 2022-07-19 NOTE — PLAN OF CARE
Problem: Discharge Planning  Goal: Discharge to home or other facility with appropriate resources  7/19/2022 0130 by Marcos Hdz RN  Outcome: Progressing Towards Goal  Flowsheets (Taken 7/18/2022 1939)  Discharge to home or other facility with appropriate resources:   Identify barriers to discharge with patient and caregiver   Arrange for needed discharge resources and transportation as appropriate   Identify discharge learning needs (meds, wound care, etc)     Problem: Pain  Goal: Verbalizes/displays adequate comfort level or baseline comfort level  7/19/2022 0130 by Marcos Hdz RN  Outcome: Progressing Towards Goal     Problem: Safety - Adult  Goal: Free from fall injury  7/19/2022 0130 by Marcos Hdz RN  Outcome: Progressing Towards Goal  Flowsheets (Taken 7/19/2022 0128)  Free From Fall Injury: Instruct family/caregiver on patient safety     Problem: ABCDS Injury Assessment  Goal: Absence of physical injury  7/19/2022 0130 by Marcos Hdz RN  Outcome: Progressing Towards Goal  Flowsheets (Taken 7/19/2022 0128)  Absence of Physical Injury: Implement safety measures based on patient assessment     Problem: Chronic Conditions and Co-morbidities  Goal: Patient's chronic conditions and co-morbidity symptoms are monitored and maintained or improved  7/19/2022 0130 by Marcos Hdz RN  Outcome: Progressing Towards Goal  Flowsheets (Taken 7/18/2022 1939)  Care Plan - Patient's Chronic Conditions and Co-Morbidity Symptoms are Monitored and Maintained or Improved:   Monitor and assess patient's chronic conditions and comorbid symptoms for stability, deterioration, or improvement   Collaborate with multidisciplinary team to address chronic and comorbid conditions and prevent exacerbation or deterioration   Update acute care plan with appropriate goals if chronic or comorbid symptoms are exacerbated and prevent overall improvement and discharge     Problem: Skin/Tissue Integrity  Goal: Absence of new skin breakdown  Description: 1. Monitor for areas of redness and/or skin breakdown  2. Assess vascular access sites hourly  3. Every 4-6 hours minimum:  Change oxygen saturation probe site  4. Every 4-6 hours:  If on nasal continuous positive airway pressure, respiratory therapy assess nares and determine need for appliance change or resting period.   7/19/2022 0130 by Bal Alva RN  Outcome: Progressing Towards Goal

## 2022-07-19 NOTE — PROGRESS NOTES
[address] [facility]    Date:   7/19/2022  Patient name:  Yvonne Trujillo  Date of admission:  7/10/2022 12:48 PM  MRN:   [id number]  YOB: 1950      HPI        Right leg pain    REVIEW OF SYSTEMS:    Cardiovascular: Negative for lightheadedness/orthostatic symptoms ,chest pain, dyspnea on exertion, palpitations or loss of consciousness. Respiratory: Negative for cough or wheezing, sputum production, hemoptysis, pleuritic pain. Gastrointestinal: Negative for nausea/vomiting, change in bowel habits, abdominal pain, dysphagia/appetite loss, hematemesis, blood in stools. OBJECTIVE:    /89   Pulse 73   Temp 98.7 °F (37.1 °C)   Resp 17   Ht 5' 3\" (1.6 m)   Wt 192 lb 10.9 oz (87.4 kg)   SpO2 99%   BMI 34.13 kg/m²      Lungs: clear to auscultation bilaterally,no wheeze. Heart: regular rate and rhythm, S1, S2 normal, no murmur, click, rub or gallop  Abdomen: soft, non-tender; bowel sounds normal; no masses,  no organomegaly  Extremities: extremities normal, atraumatic,  Right leg cellultis    Neurological:  Reflexes normal and symmetric. Sensation grossly normal  Skin - no rash, no lump   Eye- no icterus no redness  GI-oral mucosa moist,  Lymphatic system-no lymphadenopathy no splenomegaly  Mouth- mucous membrane moist  Head-normocephalic atraumatic  Neck- supple no carotid bruit,Thyroid not palpable.       Past Medical History:   has a past medical history of Allergic rhinitis, cause unspecified, Back pain, Bowel obstruction (HCC), C. difficile diarrhea, CAD (coronary artery disease), Cardiac murmur, Cellulitis, Cellulitis, Cerebral artery occlusion with cerebral infarction ([facility] [address] COVID-19, Diverticulosis of colon (without mention of hemorrhage), GERD (gastroesophageal reflux disease), GERD (gastroesophageal reflux disease), History of blood transfusion, History of CHF (congestive heart failure), History of MI (myocardial infarction), History of ovarian cyst, History of peritonitis, HTN (hypertension), Hx of blood clots, Hyperlipidemia, Intestinal or peritoneal adhesions with obstruction (postoperative) (postinfection) ([address] Kidney infection, Lateral epicondylitis  of elbow, MDRO (multiple drug resistant organisms) resistance, Muscle strain, Other abnormal glucose, PONV (postoperative nausea and vomiting), Pre-diabetes, Restless legs syndrome (RLS), Snores, 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[address] TIA (transient ischemic attack), Uses walker, Vitamin D deficiency, Wears glasses, and Wellness examination. Past Surgical History:   has a past surgical history that includes Ovary removal (1970); colectomy (1969); Appendectomy (1968); Ovary removal (1971); Hysterectomy (1973); Bunionectomy (Left); sinus surgery (2004); Colonoscopy; other surgical history (08/14/2014); cyst removal (Right); Wrist fracture surgery (Left, 03/05/2019); Upper gastrointestinal endoscopy (N/A, 05/31/2019); Upper gastrointestinal endoscopy (N/A, 08/05/2019); Upper gastrointestinal endoscopy (N/A, 08/23/2019); Abdomen surgery (1976); lumbar fusion (N/A, 02/10/2020); Cardiac catheterization; Cardiac catheterization (2005); Lapel tooth extraction; lumbar fusion (N/A, 06/17/2020); back surgery; cervical fusion (05/21/2021); and cervical fusion (N/A, 5/21/2021). Home Medications:    Prior to Admission medications    Medication Sig Start Date End Date Taking? Authorizing Provider   Skin Protectants, Misc.  (HYDROCERIN) CREA cream Apply topically 2 times daily 7/18/22  Yes Chase Marks MD   pantoprazole (PROTONIX) 40 MG tablet Take 1 tablet by mouth every morning (before breakfast) 7/19/22  Yes Chase Marks MD   cefTRIAXone (ROCEPHIN) infusion Infuse 1,000 mg intravenously every 24 hours for 10 days Compound per protocol 7/15/22 7/25/22 Yes Epi Suárez MD   busPIRone (BUSPAR) 5 MG tablet Take 1 tablet by mouth 3 times daily 7/5/22   Mary Carmen Barney MD   pramipexole (MIRAPEX) 0.5 MG tablet Take 1 tablet by mouth nightly 7/5/22   Mary Carmen Barney MD   traZODone (DESYREL) 50 MG tablet TAKE 1 TABLET BY MOUTH EVERY NIGHT AS NEEDED FOR SLEEP 6/29/22   Mary Carmen Barney MD   furosemide (LASIX) 20 MG tablet Take 1 tablet by mouth daily 6/29/22   Mary Carmen Barney MD   citalopram (CELEXA) 20 MG tablet Take 1 tablet by mouth daily 6/27/22   Mary Carmen Barney MD   gabapentin (NEURONTIN) 100 MG capsule Take 2 capsules by mouth 2 times daily for 30 days.  6/8/22 7/8/22  Kai Morgan MD   fenofibrate micronized (LOFIBRA) 134 MG capsule Take 1 capsule by mouth every morning (before breakfast) 5/23/22   Mary Carmen Barney MD   apixaban (ELIQUIS) 5 MG TABS tablet Take 1 tablet by mouth 2 times daily 5/2/22   Mary Carmen Barney MD   albuterol-ipratropium (COMBIVENT RESPIMAT)  MCG/ACT AERS inhaler Inhale 1 puff into the lungs every 6 hours as needed for Wheezing or Shortness of Breath 4/10/22   Sylvia Chacon MD   atorvastatin (LIPITOR) 40 MG tablet Take 1 tablet by mouth nightly 4/10/22   Sylvia Chacon MD   carvedilol (COREG) 12.5 MG tablet Take 1 tablet by mouth 2 times daily 4/10/22   Sylvia Chacon MD   amLODIPine (NORVASC) 5 MG tablet Take 1 tablet by mouth daily 4/10/22   Sylvia Chacon MD   acetaminophen (TYLENOL) 325 MG tablet Take 2 tablets by mouth every 4 hours as needed for Pain 4/7/22   Sylvia Chacon MD   melatonin 3 MG TABS tablet Take 2 tablets by mouth nightly as needed (insomnia) 4/7/22   Sylvia Chacon MD   nitroGLYCERIN (NITROSTAT) 0.4 MG SL tablet Place 1 tablet under the tongue every 5 minutes as needed for Chest pain 9/1/21   Mary Carmen Barney MD   cyanocobalamin ([employer] VITAMIN B12) 1000 MCG tablet Take 1 tablet by mouth daily  Patient taking differently: Take 1,000 mcg by mouth at bedtime  12/3/20   Mary Carmen Barney MD   ferrous sulfate (IRON 325) 325 (65 Fe) MG tablet Take 1 tablet by mouth 2 times daily 7/2/20   [facility], MD   VITAMIN D, ERGOCALCIFEROL, PO Take by mouth nightly   Patient not taking: Reported on 7/10/2022    Historical Provider, MD   Lancets MISC 1 each by Does not apply route daily  Patient not taking: Reported on 6/27/2022 10/10/19   Ifeoma Fowler MD   blood glucose monitor strips Test 2 times a day & as needed for symptoms of irregular blood glucose. Patient not taking: Reported on 6/27/2022 10/10/19   Gricelda Schwartz MD   Calcium Carbonate-Vitamin D (CALCIUM 500/D) 500-125 MG-UNIT TABS Take 1 tablet by mouth nightly     Historical Provider, MD       Allergies:  Bactrim [sulfamethoxazole-trimethoprim], Codeine, Diazepam, Meperidine hcl, and Seasonal    Social History:   reports that she quit smoking about 5 years ago. Her smoking use included cigarettes. She started smoking about 26 years ago. She has a 10.00 pack-year smoking history. She has never used smokeless tobacco. She reports that she does not drink alcohol and does not use drugs. Family History: family history includes Diabetes in her mother; Heart Disease in her brother, father, maternal grandmother, and mother; High Blood Pressure in her brother, mother, and sister; Stroke in her mother. Laboratory Testing:  CBC:   Recent Labs     07/19/22  0539   WBC 7.9   HGB 10.6*   *     BMP:    Recent Labs     07/17/22  0612 07/18/22  0515 07/19/22  0539    141 141   K 4.2 4.0 4.1    106 105   CO2 25 24 26   BUN 11 12 14   CREATININE 0.60 0.59 0.67   GLUCOSE 99 106* 104*     S. Calcium:  Recent Labs     07/19/22  0539   CALCIUM 8.4*     S. Ionized Calcium:No results for input(s): IONCA in the last 72 hours. S. Magnesium:No results for input(s): MG in the last 72 hours. S. Phosphorus:No results for input(s): PHOS in the last 72 hours. S. Glucose:  Recent Labs     07/18/22 2021 07/19/22  0623 07/19/22  1133   POCGLU 116* 104 103     Glycosylated hemoglobin A1C: No results for input(s): LABA1C in the last 72 hours.   INR: No results for input(s): INR in the last 72 hours. Hepatic functions: No results for input(s): ALKPHOS, ALT, AST, PROT, BILITOT, BILIDIR, LABALBU in the last 72 hours. Pancreatic functions:No results for input(s): LACTA, AMYLASE in the last 72 hours. S. Lactic Acid: No results for input(s): LACTA in the last 72 hours. Cardiac enzymes:No results for input(s): CKTOTAL, CKMB, CKMBINDEX, TROPONINI in the last 72 hours. BNP:No results for input(s): BNP in the last 72 hours. Lipid profile: No results for input(s): CHOL, TRIG, HDL, LDL, LDLCALC in the last 72 hours. Blood Gases: No results found for: PH, PCO2, PO2, HCO3, O2SAT  Thyroid functions:   Lab Results   Component Value Date/Time    TSH 1.43 05/20/2019 07:48 AM        Imaging/Diagonstics:  EKG: Normal sinus rhythm    CXR: No acute cardiopulmonary findings.           ASSESSMENT:    Patient Active Problem List   Diagnosis    Other specified disorders of rotator cuff syndrome of shoulder and allied disorders    Diverticulosis of large intestine    Intestinal or peritoneal adhesions with obstruction (postoperative) (postinfection) (HCC)    Restless legs syndrome (RLS)    GERD (gastroesophageal reflux disease)    Essential hypertension    Mixed hyperlipidemia    Other abnormal glucose    Atherosclerosis    Allergic rhinitis    Vitamin D deficiency    Depression    Degenerative joint disease (DJD) of hip    Peripheral edema    Injury of foot, left    Facial droop    CVA (cerebral vascular accident) ([facility] [address]    Bradycardia    Ataxia    Aphasia    TIA (transient ischemic attack)    Need for prophylactic vaccination and inoculation against cholera alone    Osteopenia    Lumbago    Meralgia paresthetica of right side    Lumbar degenerative disc disease    Spondylosis of lumbar region without myelopathy or radiculopathy    Blood poisoning    Cellulitis of left lower extremity    Encounter for medication monitoring    Deep vein thrombosis (DVT) of right lower extremity (HCC)    Chronic deep vein thrombosis (DVT) of proximal vein of both lower extremities (HCC)    Chronic diastolic heart failure (HCC)    Neurogenic claudication due to lumbar spinal stenosis    Sacroiliitis (HCC)    Stage 3 chronic kidney disease (HCC)    Type 2 diabetes mellitus with circulatory disorder, without long-term current use of insulin (HCC)    Chronic deep vein thrombosis (DVT) of popliteal vein of right lower extremity (HCC)    Closed fracture of left wrist    B12 deficiency    Iron deficiency anemia secondary to inadequate dietary iron intake    Closed head injury    Scalp laceration    Abnormal finding on imaging    Other irritable bowel syndrome    Frequent falls    Double vision    Muscle soreness    Peptic ulcer    Melena    PUD (peptic ulcer disease)    Absolute anemia    Acute blood loss anemia    Acute renal failure (HCC)    Clostridium difficile infection    Acquired spondylolisthesis    Spinal stenosis of lumbar region with neurogenic claudication    Acute deep vein thrombosis (DVT) of proximal vein of left lower extremity (HCC)    Leg swelling    Back pain    Neurological disorder    Closed unstable burst fracture of fifth lumbar vertebra with routine healing    Slow transit constipation    Major depressive disorder, recurrent, moderate (HCC)    Acute deep vein thrombosis (DVT) of femoral vein of left lower extremity (HCC)    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)    Acute deep vein thrombosis (DVT) of right femoral vein (HCC)    S/P cervical spinal fusion    Gait instability    Cervical myelopathy (HCC)    Cellulitis of both lower extremities    Fracture of body of sternum, initial encounter for open fracture    Nondisplaced fracture of sternal end of left clavicle, initial encounter for closed fracture    Erysipelas of right lower extremity    Closed fracture of body of sternum    Calculus of gallbladder without cholecystitis without obstruction    Leukocytosis    Type 2 diabetes mellitus with stage 3 chronic kidney disease (HCC)    Allergy to sulfa drugs    Bilateral cellulitis of lower leg       PLAN:  78-year-old lady class I obese BMI 34 baseline at 92 pounds admitted with right lower extremity cellulitis received 9 days of IV antibiotics  Risk factors stasis dermatitis bilateral leg edema uncontrolled diabetes mellitus history of for DVT right femoral vein  Dc plan to MD Umm, MD GENA PATTON[address].    Phone [phone]   Fax: [phone]  Answering Service: [phone]

## 2022-07-20 ENCOUNTER — APPOINTMENT (OUTPATIENT)
Dept: CT IMAGING | Age: 72
DRG: 872 | End: 2022-07-20
Payer: MEDICARE

## 2022-07-20 LAB
ABSOLUTE EOS #: 0.3 K/UL (ref 0–0.4)
ABSOLUTE LYMPH #: 1.6 K/UL (ref 1–4.8)
ABSOLUTE MONO #: 0.8 K/UL (ref 0.1–1.3)
ANION GAP SERPL CALCULATED.3IONS-SCNC: 11 MMOL/L (ref 9–17)
BASOPHILS # BLD: 1 % (ref 0–2)
BASOPHILS ABSOLUTE: 0 K/UL (ref 0–0.2)
BUN BLDV-MCNC: 14 MG/DL (ref 8–23)
CALCIUM SERPL-MCNC: 8.7 MG/DL (ref 8.6–10.4)
CHLORIDE BLD-SCNC: 104 MMOL/L (ref 98–107)
CO2: 25 MMOL/L (ref 20–31)
CREAT SERPL-MCNC: 0.69 MG/DL (ref 0.5–0.9)
EOSINOPHILS RELATIVE PERCENT: 3 % (ref 0–4)
GFR AFRICAN AMERICAN: >60 ML/MIN
GFR NON-AFRICAN AMERICAN: >60 ML/MIN
GFR SERPL CREATININE-BSD FRML MDRD: NORMAL ML/MIN/{1.73_M2}
GLUCOSE BLD-MCNC: 110 MG/DL (ref 65–105)
GLUCOSE BLD-MCNC: 94 MG/DL (ref 70–99)
GLUCOSE BLD-MCNC: 97 MG/DL (ref 65–105)
HCT VFR BLD CALC: 32.1 % (ref 36–46)
HEMOGLOBIN: 10.8 G/DL (ref 12–16)
LYMPHOCYTES # BLD: 19 % (ref 24–44)
MCH RBC QN AUTO: 31.3 PG (ref 26–34)
MCHC RBC AUTO-ENTMCNC: 33.5 G/DL (ref 31–37)
MCV RBC AUTO: 93.3 FL (ref 80–100)
MONOCYTES # BLD: 9 % (ref 1–7)
PDW BLD-RTO: 13.8 % (ref 11.5–14.9)
PLATELET # BLD: 508 K/UL (ref 150–450)
PMV BLD AUTO: 6.4 FL (ref 6–12)
POTASSIUM SERPL-SCNC: 4.3 MMOL/L (ref 3.7–5.3)
RBC # BLD: 3.44 M/UL (ref 4–5.2)
SEG NEUTROPHILS: 68 % (ref 36–66)
SEGMENTED NEUTROPHILS ABSOLUTE COUNT: 6 K/UL (ref 1.3–9.1)
SODIUM BLD-SCNC: 140 MMOL/L (ref 135–144)
WBC # BLD: 8.8 K/UL (ref 3.5–11)

## 2022-07-20 PROCEDURE — 97116 GAIT TRAINING THERAPY: CPT

## 2022-07-20 PROCEDURE — 96366 THER/PROPH/DIAG IV INF ADDON: CPT

## 2022-07-20 PROCEDURE — 73701 CT LOWER EXTREMITY W/DYE: CPT

## 2022-07-20 PROCEDURE — 6370000000 HC RX 637 (ALT 250 FOR IP): Performed by: NURSE PRACTITIONER

## 2022-07-20 PROCEDURE — 80048 BASIC METABOLIC PNL TOTAL CA: CPT

## 2022-07-20 PROCEDURE — 36415 COLL VENOUS BLD VENIPUNCTURE: CPT

## 2022-07-20 PROCEDURE — 6370000000 HC RX 637 (ALT 250 FOR IP): Performed by: SURGERY

## 2022-07-20 PROCEDURE — 6370000000 HC RX 637 (ALT 250 FOR IP)

## 2022-07-20 PROCEDURE — 6360000002 HC RX W HCPCS: Performed by: INTERNAL MEDICINE

## 2022-07-20 PROCEDURE — 99233 SBSQ HOSP IP/OBS HIGH 50: CPT | Performed by: INTERNAL MEDICINE

## 2022-07-20 PROCEDURE — 6370000000 HC RX 637 (ALT 250 FOR IP): Performed by: INTERNAL MEDICINE

## 2022-07-20 PROCEDURE — 2580000003 HC RX 258: Performed by: INTERNAL MEDICINE

## 2022-07-20 PROCEDURE — 2580000003 HC RX 258: Performed by: SURGERY

## 2022-07-20 PROCEDURE — 82947 ASSAY GLUCOSE BLOOD QUANT: CPT

## 2022-07-20 PROCEDURE — 99232 SBSQ HOSP IP/OBS MODERATE 35: CPT | Performed by: NURSE PRACTITIONER

## 2022-07-20 PROCEDURE — 96367 TX/PROPH/DG ADDL SEQ IV INF: CPT

## 2022-07-20 PROCEDURE — 1200000000 HC SEMI PRIVATE

## 2022-07-20 PROCEDURE — 97110 THERAPEUTIC EXERCISES: CPT

## 2022-07-20 PROCEDURE — 85025 COMPLETE CBC W/AUTO DIFF WBC: CPT

## 2022-07-20 PROCEDURE — 6360000004 HC RX CONTRAST MEDICATION: Performed by: INTERNAL MEDICINE

## 2022-07-20 RX ORDER — 0.9 % SODIUM CHLORIDE 0.9 %
80 INTRAVENOUS SOLUTION INTRAVENOUS ONCE
Status: COMPLETED | OUTPATIENT
Start: 2022-07-20 | End: 2022-07-20

## 2022-07-20 RX ORDER — SODIUM CHLORIDE 0.9 % (FLUSH) 0.9 %
10 SYRINGE (ML) INJECTION PRN
Status: DISCONTINUED | OUTPATIENT
Start: 2022-07-20 | End: 2022-07-21 | Stop reason: HOSPADM

## 2022-07-20 RX ADMIN — APIXABAN 5 MG: 5 TABLET, FILM COATED ORAL at 20:47

## 2022-07-20 RX ADMIN — SODIUM CHLORIDE 80 ML: 9 INJECTION, SOLUTION INTRAVENOUS at 15:45

## 2022-07-20 RX ADMIN — PRAMIPEXOLE DIHYDROCHLORIDE 0.5 MG: 0.25 TABLET ORAL at 20:49

## 2022-07-20 RX ADMIN — PANTOPRAZOLE SODIUM 40 MG: 40 TABLET, DELAYED RELEASE ORAL at 07:22

## 2022-07-20 RX ADMIN — CARVEDILOL 12.5 MG: 12.5 TABLET, FILM COATED ORAL at 08:30

## 2022-07-20 RX ADMIN — BUSPIRONE HYDROCHLORIDE 5 MG: 5 TABLET ORAL at 14:25

## 2022-07-20 RX ADMIN — SODIUM CHLORIDE, PRESERVATIVE FREE 10 ML: 5 INJECTION INTRAVENOUS at 20:49

## 2022-07-20 RX ADMIN — Medication: at 20:48

## 2022-07-20 RX ADMIN — IOPAMIDOL 75 ML: 755 INJECTION, SOLUTION INTRAVENOUS at 15:44

## 2022-07-20 RX ADMIN — BUSPIRONE HYDROCHLORIDE 5 MG: 5 TABLET ORAL at 20:48

## 2022-07-20 RX ADMIN — TRAZODONE HYDROCHLORIDE 50 MG: 50 TABLET ORAL at 20:48

## 2022-07-20 RX ADMIN — Medication: at 07:37

## 2022-07-20 RX ADMIN — CITALOPRAM HYDROBROMIDE 20 MG: 20 TABLET ORAL at 08:30

## 2022-07-20 RX ADMIN — OXYCODONE HYDROCHLORIDE AND ACETAMINOPHEN 1 TABLET: 5; 325 TABLET ORAL at 03:14

## 2022-07-20 RX ADMIN — OXYCODONE HYDROCHLORIDE AND ACETAMINOPHEN 1 TABLET: 5; 325 TABLET ORAL at 20:47

## 2022-07-20 RX ADMIN — OXYCODONE HYDROCHLORIDE AND ACETAMINOPHEN 1 TABLET: 5; 325 TABLET ORAL at 11:55

## 2022-07-20 RX ADMIN — CARVEDILOL 12.5 MG: 12.5 TABLET, FILM COATED ORAL at 20:47

## 2022-07-20 RX ADMIN — VANCOMYCIN HYDROCHLORIDE 2250 MG: 1 INJECTION, POWDER, LYOPHILIZED, FOR SOLUTION INTRAVENOUS at 17:05

## 2022-07-20 RX ADMIN — SODIUM CHLORIDE, PRESERVATIVE FREE 10 ML: 5 INJECTION INTRAVENOUS at 10:21

## 2022-07-20 RX ADMIN — OXYCODONE HYDROCHLORIDE AND ACETAMINOPHEN 1 TABLET: 5; 325 TABLET ORAL at 07:25

## 2022-07-20 RX ADMIN — FERROUS SULFATE TAB 325 MG (65 MG ELEMENTAL FE) 325 MG: 325 (65 FE) TAB at 08:30

## 2022-07-20 RX ADMIN — FERROUS SULFATE TAB 325 MG (65 MG ELEMENTAL FE) 325 MG: 325 (65 FE) TAB at 20:47

## 2022-07-20 RX ADMIN — SODIUM CHLORIDE, PRESERVATIVE FREE 10 ML: 5 INJECTION INTRAVENOUS at 15:44

## 2022-07-20 RX ADMIN — AMLODIPINE BESYLATE 5 MG: 5 TABLET ORAL at 08:30

## 2022-07-20 RX ADMIN — BUSPIRONE HYDROCHLORIDE 5 MG: 5 TABLET ORAL at 08:30

## 2022-07-20 RX ADMIN — APIXABAN 5 MG: 5 TABLET, FILM COATED ORAL at 08:30

## 2022-07-20 RX ADMIN — ATORVASTATIN CALCIUM 40 MG: 40 TABLET, FILM COATED ORAL at 20:48

## 2022-07-20 ASSESSMENT — PAIN SCALES - GENERAL
PAINLEVEL_OUTOF10: 7
PAINLEVEL_OUTOF10: 8
PAINLEVEL_OUTOF10: 10
PAINLEVEL_OUTOF10: 7

## 2022-07-20 ASSESSMENT — PAIN DESCRIPTION - ORIENTATION
ORIENTATION: RIGHT

## 2022-07-20 ASSESSMENT — PAIN DESCRIPTION - LOCATION
LOCATION: RIB CAGE;LEG
LOCATION: LEG
LOCATION: LEG;BACK

## 2022-07-20 ASSESSMENT — PAIN - FUNCTIONAL ASSESSMENT: PAIN_FUNCTIONAL_ASSESSMENT: PREVENTS OR INTERFERES SOME ACTIVE ACTIVITIES AND ADLS

## 2022-07-20 NOTE — PROGRESS NOTES
[facility] - Initial Consult Note  Today's Date and Time: 7/20/2022, 12:44 PM    Impression :   RLE Erisepylas  Cholelithiasis. Leukocytosis  Elevated CRP  Recent fall. DM  CKD III  Allergy to Sulfa-Confusion. Recommendations:   Ceftriaxone 1 gm IV daily x 10 days until 7/20/22. Once completed, monitor off antibiotics. Follow CBC and renal function closely. Compression stockings to BLE. Okay to discharge from ID standpoint. Supportive care. Medical Decision Making/Summary/Discussion:7/20/2022     Pen G is ideal therapy. Not a good choice due to Na levels, pt. Decreased renal function,hx of CHF. Small break in the skin noted to the right lateral calf. Scant amount of serous drainage. Check blood cultures. RLE improving. Shorten antibiotic course to 10 days. Infection Control Recommendations   [place] Precautions    Antimicrobial Stewardship Recommendations     Simplification of therapy  Targeted therapy    Coordination of Outpatient Care:   Estimated Length of IV antimicrobials:10 days until 7/20/22  Patient will need Midline Catheter Insertion: Midline placed 7/13/22  Patient will need PICC line Insertion:BD  Patient will need: Home IV , [place],  SNF,  LTAC: TBD  Patient will need outpatient wound care:    Chief complaint/reason for consultation:   Possible RLE Cellulitis      History of Present Illness:   Tiffany Albarran is a 71y.o.-year-old female who was initially admitted on 7/10/2022. Patient seen at the request of     INITIAL HISTORY:  61-year-old female who presented to the ED s/p fall 2 days ago. Patient states she trying to open a box of food and she slipped and fell. She hit her right side on something, she is not sure what. Angel loss of consciousness. Associated symptoms include left hip pain, pain to her right rib cage and right upper abdomen. No associated symptoms of headache, neck or back pain.   She is on blood thinners. CT results reviewed as below. Recent hospitalization 6/5-6/14 for BLE cellulitis. Treated with IV Vancomycin, discharged on Doxycycline x 7 days. Patient presents with BLE redness and swelling. Upon examination patient has erysepilas to the RLE, mid-forefoot to mid-thigh. Light pink in color. No open wounds or areas of skin breaks. Patient states she has had this for about 2-weeks. Denies any pain to the RLE. CURRENT EVALUATION 7/20/2022  BP (!) 174/56   Pulse 72   Temp 98.3 °F (36.8 °C) (Oral)   Resp 16   Ht 5' 3\" (1.6 m)   Wt 192 lb 10.9 oz (87.4 kg)   SpO2 96%   BMI 34.13 kg/m²   Feeling good. Up in chair. Denies any fever, chills, n/v/d. RLE continues to improve. Would benefit from compression stockings. Labs stable. Labs, X rays reviewed: 7/20/2022    BUN:44>44>48  Cr:1.26>1.25>1.52>1.40>0.79>0.72>0.73>0.63>0.59>0.67>0.69    WBC:16.0>14.6>11.7>9.5>9.9>8.6>8.8>8.2>7.9>8.8  Hb:  Plat: 295>225>205>310>490>508    CRP:166.1>63.4    Cultures:  Urine:  7/11 UA-Negative. Blood:  7/12 Negative to date. Sputum :    Wound:    MRSA Nares:      Imaging:  XR HIP 2-3 VW W PELVIS LEFT   Preliminary Result   No acute bony abnormality identified. CT CHEST ABDOMEN PELVIS W CONTRAST   Preliminary Result   No acute traumatic abnormality identified in the chest, abdomen, or pelvis. Subacute mildly displaced proximal sternal body fracture. Age-indeterminate buckle fracture of the right lateral 10th rib. Multiple   old left-sided rib fractures. Distended gallbladder containing a stone at the gallbladder neck. Postsurgical changes in the lower lumbar spine with chronic appearing height   loss of L5.           CT CERVICAL SPINE WO CONTRAST   Final Result   1. Kyphosis of the cervical spine centered at C6-C7. 2. Mild multilevel degenerative changes in the cervical spine primarily at   C5-C6. 3. No acute vertebral body height loss in the cervical spine.    4. History of CHF (congestive heart failure)     History of MI (myocardial infarction) 2005    thought due to a blood clot    History of ovarian cyst 1970    had oopherectomy holly    History of peritonitis 1968    due to ruptured appendix age 13    HTN (hypertension)     Hx of blood clots     right leg    Hyperlipidemia     Intestinal or peritoneal adhesions with obstruction (postoperative) (postinfection) ([facility] [address]     Kidney infection     renal failure/sepsis/spider bite    Lateral epicondylitis  of elbow     MDRO (multiple drug resistant organisms) resistance     c diff    Muscle strain     right posterior shoulder    Other abnormal glucose     PONV (postoperative nausea and vomiting)     dry heaves    Pre-diabetes     Restless legs syndrome (RLS)     Snores     no cpap    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)     TIA (transient ischemic attack) 2014    Uses walker     Vitamin D deficiency     Wears glasses     Wellness examination     last seen 2 weeks ago       Past Surgical  History:     Past Surgical History:   Procedure Laterality Date    ABDOMEN SURGERY  1976    benign tumor removed near remaining ovary, 1.5 pounds    APPENDECTOMY  1968    appendix ruptured, developed peritonitis    BACK SURGERY      BUNIONECTOMY Left     along with calcium deposits removed    CARDIAC CATHETERIZATION      CARDIAC CATHETERIZATION  2005    negative    CERVICAL FUSION  05/21/2021    POSTERIOR C3-6 LAMINECTOMY, PARTIAL C7 LAMINECTOMY, FUSION C3-C6, SILVERCORD    CERVICAL FUSION N/A 5/21/2021    POSTERIOR C3-6 LAMINECTOMY, PARTIAL C7 LAMINECTOMY, FUSION C3-C6, SILVERCORD performed by Cristiane Allen DO at [address] [place] [address]    12 INCHES REMOVED D/T OBSTRUCTION    COLONOSCOPY      CYST REMOVAL Right     right facial    HYSTERECTOMY (CERVIX STATUS UNKNOWN)  1973    taken as a result of recurring cysts    LUMBAR FUSION N/A 02/10/2020    LUMBAR L4-5 POSTERIOR  DECOMPRESSION INSTRUMENTATION FUSION WCEMENT AUGMENTATION/ performed by Kelsie Wolf MD at [facility] N/A 2020    L5-S1 PLIF L4-L5 REVISION performed by Kelsie Wolf MD at [address] [place] [address]  2014    FESS    OVARY REMOVAL  1970    UNILATERAL due to cyst    OVARY REMOVAL      partial, due to cyst    SINUS SURGERY      UPPER GASTROINTESTINAL ENDOSCOPY N/A 2019    EGD ESOPHAGOGASTRODUODENOSCOPY performed by Brian Santacruz MD at [facility] N/A 2019    EGD BIOPSY performed by Chani Moralez MD at [place] [address] N/A 2019    EGD BIOPSY performed by Brian Santacruz MD at [address] Left 2019    WRIST OPEN REDUCTION INTERNAL FIXATION performed by Bennett Payne MD at [facility] OR       Medications:      Hydrocerin   Topical BID    pantoprazole  40 mg Oral QAM AC    amLODIPine  5 mg Oral Daily    carvedilol  12.5 mg Oral BID    cefTRIAXone (ROCEPHIN) IV  1,000 mg IntraVENous Q24H    apixaban  5 mg Oral BID    sodium chloride flush  5-40 mL IntraVENous 2 times per day    atorvastatin  40 mg Oral Nightly    busPIRone  5 mg Oral TID    citalopram  20 mg Oral Daily    ferrous sulfate  325 mg Oral BID    pramipexole  0.5 mg Oral Nightly       Social History:     Social History     Socioeconomic History    Marital status:      Spouse name: Not on file    Number of children: Not on file    Years of education: Not on file    Highest education level: Not on file   Occupational History    Occupation: retired   Tobacco Use    Smoking status: Former     Packs/day: 0.50     Years: 20.00     Pack years: 10.00     Types: Cigarettes     Start date: 1995     Quit date: 2017     Years since quittin.0    Smokeless tobacco: Never   Vaping Use    Vaping Use: Never used   Substance and Sexual Activity    Alcohol use: No     Alcohol/week: 0.0 standard drinks    Drug use: No    Sexual activity: Not on file   Other Topics Concern    Not on file   Social History Narrative    Not on file     Social Determinants of Health     Financial Resource Strain: Not on file   Food Insecurity: Not on file   Transportation Needs: Not on file   Physical Activity: Not on file   Stress: Not on file   Social Connections: Not on file   Intimate Partner Violence: Not on file   Housing Stability: Not on file       Family History:     Family History   Problem Relation Age of Onset    Stroke Mother     Diabetes Mother     Heart Disease Mother     High Blood Pressure Mother     Heart Disease Father     Heart Disease Brother     High Blood Pressure Brother     Heart Disease Maternal Grandmother     High Blood Pressure Sister         Allergies:   Bactrim [sulfamethoxazole-trimethoprim], Codeine, Diazepam, Meperidine hcl, and Seasonal     Review of Systems:   Constitutional: No fevers or chills. No systemic complaints  Head: No headaches  Eyes: No double vision or blurry vision. No conjunctival inflammation. R orbit echymotic. ENT: No sore throat or runny nose. Cardiovascular: No chest pain or palpitations. No shortness of breath. No HENDERSON  Lung: No shortness of breath or cough. No sputum production  Abdomen: No nausea, vomiting, diarrhea, or abdominal pain. [place] Chime No cramps. Genitourinary: No increased urinary frequency, or dysuria. No hematuria. No suprapubic or CVA pain  Musculoskeletal:c/o pain to broken rib. Hematologic: No bleeding or bruising. Neurologic: No headache, weakness, numbness, or tingling. Integument: No rash, no ulcers. 2-week hx of redness to RLE. Psychiatric: No depression. Endocrine: No polyuria, no polydipsia, no polyphagia.     Physical Examination :     Patient Vitals for the past 8 hrs:   BP Temp Temp src Pulse Resp SpO2   07/20/22 1155 -- -- -- -- 16 --   07/20/22 0830 (!) 174/56 -- -- 72 -- --   07/20/22 0624 (!) 174/56 98.3 °F (36.8 °C) Oral 72 18 96 %       General Appearance: Awake, alert, and in no apparent distress  Head:  Normocephalic, right orbit ecchymotic from fall. Eyes: Pupils equal, round, reactive to light and accommodation; extraocular movements intact; sclera anicteric; conjunctivae pink. ENT: Oropharynx clear, without erythema, exudate, or thrush. No tenderness of sinuses. Mouth/throat: mucosa pink and moist.   Neck:Supple, without lymphadenopathy. No JVD, tracheal deviation. Pulmonary/Chest: Clear to auscultation, without wheezes, rales, or rhonchi. Cardiovascular: Regular rate and rhythm without murmurs, rubs, or gallops. Abdomen: Soft, non tender. Bowel sounds normal.   All four Extremities: No cyanosis, clubbing. 1+edema to RLE. Neurologic: No gross sensory or motor deficits. Skin: Warm and dry with good turgor. RLE pink with pretibial erythema. Medical Decision Making -Laboratory:   I have independently reviewed/ordered the following labs:    CBC with Differential:   Recent Labs     07/19/22  0539 07/20/22  0628   WBC 7.9 8.8   HGB 10.6* 10.8*   HCT 31.3* 32.1*   * 508*   LYMPHOPCT 18* 19*   MONOPCT 10* 9*       BMP:   Recent Labs     07/19/22  0539 07/20/22  0628    140   K 4.1 4.3    104   CO2 26 25   BUN 14 14   CREATININE 0.67 0.69       Hepatic Function Panel:   No results for input(s): PROT, LABALBU, BILIDIR, IBILI, BILITOT, ALKPHOS, ALT, AST in the last 72 hours. No results for input(s): RPR in the last 72 hours. No results for input(s): HIV in the last 72 hours. No results for input(s): BC in the last 72 hours.   Lab Results   Component Value Date/Time    MUCUS NOT REPORTED 08/21/2019 10:00 PM    RBC 3.44 07/20/2022 06:28 AM    TRICHOMONAS NOT REPORTED 08/21/2019 10:00 PM    WBC 8.8 07/20/2022 06:28 AM    YEAST NOT REPORTED 08/21/2019 10:00 PM    TURBIDITY Clear 07/11/2022 06:13 PM     Lab Results   Component Value Date/Time    CREATININE 0.69 07/20/2022 06:28 AM    GLUCOSE 94 07/20/2022 06:28 AM       Medical Decision Making-Imaging: Medical Decision [id number]:       Medical Decision Making-Other:     Note:  Labs, medications, radiologic studies were reviewed with personal review of films  Large amounts of data were reviewed  Discussed with nursing Staff, Discharge planner  Infection Control and Prevention measures reviewed  All prior entries were reviewed  Administer medications as ordered  Prognosis: Good  Discharge planning reviewed  Follow up as outpatient. Thank you for allowing us to participate in the care of this patient. Please call with questions.     Chantal Wills, APRN - CNP    Perfect Serve/Office: [phone]

## 2022-07-20 NOTE — PROGRESS NOTES
[address]   OCCUPATIONAL THERAPY MISSED TREATMENT NOTE   INPATIENT   Date: 22  Patient Name: Lazarus Hernandez       Room:   MRN: [id number]   Account #: [de-identified]    : 1950  (71 y.o.)  Gender: female   Referring Practitioner: Diandra Yepez MD  Diagnosis: Fracture of body of sternum, right lateral rib fracture             REASON FOR MISSED TREATMENT:  Patient at testing and/or off the floor   -   [facility]  Will continue to follow      SHWETA Reyes

## 2022-07-20 NOTE — PROGRESS NOTES
Physical Therapy  Facility/Department: [facility] MED SURG  Daily Treatment Note  NAME: Gideon Marie  : 1950  MRN: [id number]    Date of Service: 2022    Discharge Recommendations:  Patient would benefit from continued therapy after discharge, Therapy recommended at discharge        Patient Diagnosis(es): The primary encounter diagnosis was Closed fracture of body of sternum, initial encounter. Diagnoses of Closed fracture of one rib of right side, initial encounter and Contusion of face, initial encounter were also pertinent to this visit. Assessment   Activity Tolerance: Patient tolerated treatment well;Patient limited by endurance     Plan    Plan  Plan: 6-7 times per week  Specific Instructions for Next Treatment: BLE strengthening with focus on hip flexors and core; Focused body mechanics training during descent to chair; Progress ambulation distance  Current Treatment Recommendations: Strengthening;ROM;Balance training;Functional mobility training;Transfer training; Endurance training;Gait training;Pain management; Safety education & training; Therapeutic activities     Restrictions  Restrictions/Precautions  Restrictions/Precautions: Fall Risk, General Precautions  Required Braces or Orthoses?: No  Implants present? : Metal implants (Spinal Fusion Hardware)  Position Activity Restriction  Other position/activity restrictions: subacute sternum fracture; avoid excessive pushing/pulling/lifting     Subjective    Subjective  Subjective: Pt in bed upon arrival. Pt reports feeling uncomfortable and increased pain  Pain: 8/10  Orientation  Overall Orientation Status: Within Functional Limits  Orientation Level: Oriented X4     Objective   Vitals     Bed Mobility Training  Bed Mobility Training: Yes  Overall Level of Assistance: Additional time; Adaptive equipment;Assist X1;Contact-guard assistance  Interventions: Safety awareness training;Verbal cues; Tactile cues  Rolling: Stand-by assistance  Supine to Sit: Contact-guard assistance  Sit to Supine:  (NT)  Scooting: Stand-by assistance; Additional time;Assist X1 (to EOB)  Balance  Sitting: Impaired  Sitting - Static: Good (unsupported)  Sitting - Dynamic: Fair (occasional)  Standing: Impaired  Standing - Static: Fair  Standing - Dynamic: Fair  Transfer Training  Transfer Training: Yes  Overall Level of Assistance: Minimum assistance; Adaptive equipment;Assist X1  Interventions: Safety awareness training;Verbal cues; Tactile cues  Sit to Stand: Contact-guard assistance  Stand to Sit: Contact-guard assistance  Stand Pivot Transfers: Minimum assistance  Bed to Chair: Minimum assistance  Gait Training  Gait Training: Yes  Gait  Interventions: Verbal cues; Tactile cues; Safety awareness training  Base of Support: Widened  Speed/Alicia: Slow  Step Length: Left lengthened;Right shortened  Swing Pattern: Left asymmetrical;Right asymmetrical  Stance: Left increased;Right decreased  Gait Abnormalities: Antalgic;Decreased step clearance;Shuffling gait; Step to gait;Trunk sway increased; Toe walking  Distance (ft): 30 Feet  Assistive Device: Walker, rolling     PT Exercises  A/AROM Exercises: Seated B LE ex's x10- AROM and orange tband used       07/13/22 0920 07/20/22 1433   [facility] Mobility Inpatient    How much difficulty turning over in bed? 2 3   How much difficulty sitting down on / standing up from a chair with arms? 3 3   How much difficulty moving from lying on back to sitting on side of bed? 1 3   How much help from another person moving to and from a bed to a chair? 3 3   How much help from another person needed to walk in hospital room? 3 3   How much help from another person for climbing 3-5 steps with a railing? 1 1   [facility] Raw Score  13 16   [facility] T-Scale Score  36.74 40.78   Mobility Inpatient CMS 0-100% Score 64.91 54.16   Mobility Inpatient CMS G-Code Modifier  CL CK        Safety Devices  Type of Devices:  All fall risk precautions in place;Call light within reach;Gait belt;Patient at risk for falls; Left in chair;Nurse notified       Goals  Short Term Goals  Short term goal 1: pt to demo all transfers with RW and SBA  Short term goal 2: pt to ambulate 48' with RW and SBA   Short term goal 3: pt to demo improved BLE strength by 1/2 MMG to improve safety and ease with mobility  Short term goal 4: pt to perform all bed mobility with CGA-MinAx1 to decrease burden of care  Patient Goals   Patient goals : pt does not verbalize goals    Education  Patient Education  Education Given To: Patient  Education Provided: Plan of Care;Role of Therapy;Transfer Training  Education Provided Comments: Safe use of RW  Education Method: Verbal  Barriers to Learning: None  Education Outcome: Continued education needed    Therapy Time   Individual Concurrent Group Co-treatment   Time In 0944         Time Out 1027         Minutes [address] [place] [address]

## 2022-07-20 NOTE — PROGRESS NOTES
Writer messaged Juliann Dilling with infectious dx to notify her of the worsen of pts RLLE and antibiotics changes of IV rocephin to IV vanco.Also notified of CT of RLLE to rule out abscess.

## 2022-07-20 NOTE — PLAN OF CARE
Problem: Discharge Planning  Goal: Discharge to home or other facility with appropriate resources  7/20/2022 0211 by Александр Rivera RN  Outcome: Progressing  Flowsheets (Taken 7/19/2022 1945)  Discharge to home or other facility with appropriate resources:   Identify barriers to discharge with patient and caregiver   Arrange for needed discharge resources and transportation as appropriate   Identify discharge learning needs (meds, wound care, etc)   Refer to discharge planning if patient needs post-hospital services based on physician order or complex needs related to functional status, cognitive ability or social support system     Problem: Pain  Goal: Verbalizes/displays adequate comfort level or baseline comfort level  7/20/2022 0211 by Александр Rivera RN  Outcome: Progressing     Problem: Safety - Adult  Goal: Free from fall injury  Outcome: Progressing  Flowsheets (Taken 7/20/2022 0210)  Free From Fall Injury:   Instruct family/caregiver on patient safety   Based on caregiver fall risk screen, instruct family/caregiver to ask for assistance with transferring infant if caregiver noted to have fall risk factors     Problem: ABCDS Injury Assessment  Goal: Absence of physical injury  Outcome: Progressing  Flowsheets (Taken 7/20/2022 0210)  Absence of Physical Injury: Implement safety measures based on patient assessment     Problem: Chronic Conditions and Co-morbidities  Goal: Patient's chronic conditions and co-morbidity symptoms are monitored and maintained or improved  7/20/2022 0211 by Александр Rivera RN  Outcome: Progressing  Flowsheets (Taken 7/19/2022 1945)  Care Plan - Patient's Chronic Conditions and Co-Morbidity Symptoms are Monitored and Maintained or Improved:   Monitor and assess patient's chronic conditions and comorbid symptoms for stability, deterioration, or improvement   Collaborate with multidisciplinary team to address chronic and comorbid conditions and prevent exacerbation or deterioration

## 2022-07-20 NOTE — PROGRESS NOTES
Vancomycin Dosing by Pharmacy - Initial Note   TODAY'S DATE:  7/20/2022  Patient name, age:  Arely Majano, 71 y.o. Indication: SSTI, MRSA suspected. Additional antimicrobials:  ceftriaxone    Allergies:  Bactrim [sulfamethoxazole-trimethoprim], Codeine, Diazepam, Meperidine hcl, and Seasonal   Actual Weight:    Wt Readings from Last 1 Encounters:   07/12/22 192 lb 10.9 oz (87.4 kg)     Labs/Ancillary Data  Estimated Creatinine Clearance: 78 mL/min (based on SCr of 0.69 mg/dL). Recent Labs     07/18/22  0515 07/19/22  0539 07/20/22  0628   CREATININE 0.59 0.67 0.69   BUN 12 14 14   WBC 8.2 7.9 8.8     No results found for: PROCAL    Intake/Output Summary (Last 24 hours) at 7/20/2022 1523  Last data filed at 7/20/2022 0836  Gross per 24 hour   Intake --   Output 1400 ml   Net -1400 ml     Temp: 98.3    Recent vancomycin administrations        No vancomycin IV orders with administrations found. Orders not given:            vancomycin (VANCOCIN) 2,250 mg in dextrose 5 % 500 mL IVPB    vancomycin (VANCOCIN) intermittent dosing (placeholder)    vancomycin 1000 mg IVPB in 250 mL D5W addavial                  Culture Date / Source  /  Results  See micro    MRSA Nasal Swab: N/A. Non-respiratory infection. Leslie Armstrong PLAN   Initial loading dose of 25mg/kg (max of 2,500 mg) = 2250  mg  x 1, then  vancomycin 1000 mg IV every 12 hours. Ensured BUN/sCr ordered at baseline and every 48 hours x at least 3 levels, then at least weekly. Vancomycin level ordered for 7/22 @ 0500. Will use bayesian method for dosing. This level will not be a trough. Target AUC/JOJO: 400-600.       Vancomycin Target Concentration Parameters  Treatment  Population Target AUC/JOJO Target Trough   Invasive MRSA Infection (bacteremia, pneumonia, meningitis, endocarditis, osteomyelitis)  Sepsis (undifferentiated) 400-600 N/A   Infection due to non-MRSA pathogen  Empiric treatment of non-invasive MRSA infection  (SSTI, UTI) <500 10-15 mg/L   CrCl < 29 mL/min  Rapidly fluctuating serum creatinine   BIN N/A < 15 mg/L     Renal replacement therapy is dosed by levels, per hospital protocol. Abbreviations  * Pauc: probability that AUC is >400 (efficacy); Pconc: probability that Ctrough is above 20 ?g/mL (toxicity); Tox: Probability of nephrotoxicity, based on Monse et al. Clin Infect Dis 2009. Loading dose: 2250 mg at 16:00 07/20/2022. Regimen: 1000 mg IV every 12 hours. Start time: 15:19 on 07/20/2022  Exposure target: AUC24 (range)400-600 mg/L.hr   AUC24,ss: 499 mg/L.hr  Probability of AUC24 > 400: 73 %  Ctrough,ss: 16.0 mg/L  Probability of Ctrough,ss > 20: 32 %  Probability of nephrotoxicity (Monse VERONICA 2009): 11 %      Thank you for the consult. Pharmacy will continue to follow.    Cristhian Sparks RPh  7/20/2022  3:25 PM

## 2022-07-20 NOTE — PROGRESS NOTES
[address] [facility].    Date:   7/20/2022  Patient name:  Sherri Dye  Date of admission:  7/10/2022 12:48 PM  MRN:   [id number]  YOB: 1950      HPI        Right leg pain    REVIEW OF SYSTEMS:    Cardiovascular: Negative for lightheadedness/orthostatic symptoms ,chest pain, dyspnea on exertion, palpitations or loss of consciousness. Respiratory: Negative for cough or wheezing, sputum production, hemoptysis, pleuritic pain. Gastrointestinal: Negative for nausea/vomiting, change in bowel habits, abdominal pain, dysphagia/appetite loss, hematemesis, blood in stools. OBJECTIVE:    BP (!) 174/56   Pulse 72   Temp 98.3 °F (36.8 °C) (Oral)   Resp 16   Ht 5' 3\" (1.6 m)   Wt 192 lb 10.9 oz (87.4 kg)   SpO2 96%   BMI 34.13 kg/m²      Lungs: clear to auscultation bilaterally,no wheeze. Heart: regular rate and rhythm, S1, S2 normal, no murmur, click, rub or gallop  Abdomen: soft, non-tender; bowel sounds normal; no masses,  no organomegaly  Extremities: extremities normal, atraumatic,  Right leg cellultis    Neurological:  Reflexes normal and symmetric. Sensation grossly normal  Skin - no rash, no lump   Eye- no icterus no redness  GI-oral mucosa moist,  Lymphatic system-no lymphadenopathy no splenomegaly  Mouth- mucous membrane moist  Head-normocephalic atraumatic  Neck- supple no carotid bruit,Thyroid not palpable.       Past Medical History:   has a past medical history of Allergic rhinitis, cause unspecified, Back pain, Bowel obstruction (HCC), C. difficile diarrhea, CAD (coronary artery disease), Cardiac murmur, Cellulitis, Cellulitis, Cerebral artery occlusion with cerebral infarction ([facility] [address] COVID-19, Diverticulosis of colon (without mention of hemorrhage), GERD (gastroesophageal reflux disease), GERD (gastroesophageal reflux disease), History of blood transfusion, History of CHF (congestive heart failure), History of MI (myocardial infarction), History of ovarian cyst, History of peritonitis, HTN (hypertension), Hx of blood clots, Hyperlipidemia, Intestinal or peritoneal adhesions with obstruction (postoperative) (postinfection) ([address] Kidney infection, Lateral epicondylitis  of elbow, MDRO (multiple drug resistant organisms) resistance, Muscle strain, Other abnormal glucose, PONV (postoperative nausea and vomiting), Pre-diabetes, Restless legs syndrome (RLS), Snores, 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[address] TIA (transient ischemic attack), Uses walker, Vitamin D deficiency, Wears glasses, and Wellness examination. Past Surgical History:   has a past surgical history that includes Ovary removal (1970); colectomy (1969); Appendectomy (1968); Ovary removal (1971); Hysterectomy (1973); Bunionectomy (Left); sinus surgery (2004); Colonoscopy; other surgical history (08/14/2014); cyst removal (Right); Wrist fracture surgery (Left, 03/05/2019); Upper gastrointestinal endoscopy (N/A, 05/31/2019); Upper gastrointestinal endoscopy (N/A, 08/05/2019); Upper gastrointestinal endoscopy (N/A, 08/23/2019); Abdomen surgery (1976); lumbar fusion (N/A, 02/10/2020); Cardiac catheterization; Cardiac catheterization (2005); [place] tooth extraction; lumbar fusion (N/A, 06/17/2020); back surgery; cervical fusion (05/21/2021); and cervical fusion (N/A, 5/21/2021). Home Medications:    Prior to Admission medications    Medication Sig Start Date End Date Taking? Authorizing Provider   Skin Protectants, Misc.  (HYDROCERIN) CREA cream Apply topically 2 times daily 7/18/22  Yes Louisa Davis MD   pantoprazole (PROTONIX) 40 MG tablet Take 1 tablet by mouth every morning (before breakfast) 7/19/22  Yes Louisa Davis MD   cefTRIAXone (ROCEPHIN) infusion Infuse 1,000 mg intravenously every 24 hours for 10 days Compound per protocol 7/15/22 7/25/22 Yes Ludy Gillette MD   busPIRone (BUSPAR) 5 MG tablet Take 1 tablet by mouth 3 times daily 7/5/22   Rishi Marinelli MD   pramipexole (MIRAPEX) 0.5 MG tablet Take 1 tablet by mouth nightly 7/5/22   Rishi Marinelli MD   traZODone (DESYREL) 50 MG tablet TAKE 1 TABLET BY MOUTH EVERY NIGHT AS NEEDED FOR SLEEP 6/29/22   Rishi Marinelli MD   furosemide (LASIX) 20 MG tablet Take 1 tablet by mouth daily 6/29/22   Rishi Marinelli MD   citalopram (CELEXA) 20 MG tablet Take 1 tablet by mouth daily 6/27/22   Rishi Marinelli MD   gabapentin (NEURONTIN) 100 MG capsule Take 2 capsules by mouth 2 times daily for 30 days.  6/8/22 7/8/22  Prudencio Garland MD   fenofibrate micronized (LOFIBRA) 134 MG capsule Take 1 capsule by mouth every morning (before breakfast) 5/23/22   Rishi Marinelli MD   apixaban (ELIQUIS) 5 MG TABS tablet Take 1 tablet by mouth 2 times daily 5/2/22   Rishi Marinelli MD   albuterol-ipratropium (COMBIVENT RESPIMAT)  MCG/ACT AERS inhaler Inhale 1 puff into the lungs every 6 hours as needed for Wheezing or Shortness of Breath 4/10/22   Charles Nolasco MD   atorvastatin (LIPITOR) 40 MG tablet Take 1 tablet by mouth nightly 4/10/22   Charles Nolasco MD   carvedilol (COREG) 12.5 MG tablet Take 1 tablet by mouth 2 times daily 4/10/22   Charles Nolasco MD   amLODIPine (NORVASC) 5 MG tablet Take 1 tablet by mouth daily 4/10/22   Charles Nolasco MD   acetaminophen (TYLENOL) 325 MG tablet Take 2 tablets by mouth every 4 hours as needed for Pain 4/7/22   Charles Nolasco MD   melatonin 3 MG TABS tablet Take 2 tablets by mouth nightly as needed (insomnia) 4/7/22   Charles Nolasco MD   nitroGLYCERIN (NITROSTAT) 0.4 MG SL tablet Place 1 tablet under the tongue every 5 minutes as needed for Chest pain 9/1/21   Rishi Marinelli MD   cyanocobalamin ([employer] VITAMIN B12) 1000 MCG tablet Take 1 tablet by mouth daily  Patient taking differently: Take 1,000 mcg by mouth at bedtime  12/3/20   Rishi Marinelli MD   ferrous sulfate (IRON 325) 325 (65 Fe) MG tablet Take 1 tablet by mouth 2 times daily 7/2/20   Jomar Reddy No results for input(s): INR in the last 72 hours. Hepatic functions: No results for input(s): ALKPHOS, ALT, AST, PROT, BILITOT, BILIDIR, LABALBU in the last 72 hours. Pancreatic functions:No results for input(s): LACTA, AMYLASE in the last 72 hours. S. Lactic Acid: No results for input(s): LACTA in the last 72 hours. Cardiac enzymes:No results for input(s): CKTOTAL, CKMB, CKMBINDEX, TROPONINI in the last 72 hours. BNP:No results for input(s): BNP in the last 72 hours. Lipid profile: No results for input(s): CHOL, TRIG, HDL, LDL, LDLCALC in the last 72 hours. Blood Gases: No results found for: PH, PCO2, PO2, HCO3, O2SAT  Thyroid functions:   Lab Results   Component Value Date/Time    TSH 1.43 05/20/2019 07:48 AM        Imaging/Diagonstics:  EKG: Normal sinus rhythm    CXR: No acute cardiopulmonary findings.           ASSESSMENT:    Patient Active Problem List   Diagnosis    Other specified disorders of rotator cuff syndrome of shoulder and allied disorders    Diverticulosis of large intestine    Intestinal or peritoneal adhesions with obstruction (postoperative) (postinfection) (HCC)    Restless legs syndrome (RLS)    GERD (gastroesophageal reflux disease)    Essential hypertension    Mixed hyperlipidemia    Other abnormal glucose    Atherosclerosis    Allergic rhinitis    Vitamin D deficiency    Depression    Degenerative joint disease (DJD) of hip    Peripheral edema    Injury of foot, left    Facial droop    CVA (cerebral vascular accident) ([address]    Bradycardia    Ataxia    Aphasia    TIA (transient ischemic attack)    Need for prophylactic vaccination and inoculation against cholera alone    Osteopenia    Lumbago    Meralgia paresthetica of right side    Lumbar degenerative disc disease    Spondylosis of lumbar region without myelopathy or radiculopathy    Blood poisoning    Cellulitis of left lower extremity    Encounter for medication monitoring    Deep vein thrombosis (DVT) of right lower extremity mellitus with stage 3 chronic kidney disease (HCC)    Allergy to sulfa drugs    Bilateral cellulitis of lower leg       PLAN:  70-year-old lady class I obese BMI 34 baseline at 92 pounds admitted with right lower extremity cellulitis received 9 days of IV antibiotics  Risk factors stasis dermatitis bilateral leg edema uncontrolled diabetes mellitus history of for DVT right femoral vein    July 20  Right leg still very warm to touch  Painful  Failed many days of iv abx  Will dc ceftriaxone  [place] start vanco  And ct leg with contrast to rule out abscess        Kinza Chavis MD, MD  [facility], [address] [place] [address].    Phone [phone]   Fax: [phone]  Answering Service: [phone]

## 2022-07-20 NOTE — PROGRESS NOTES
Physician Progress Note      PATIENT:               Yris Boyd  CSN #:                  [id number]  :                       1950  ADMIT DATE:       7/10/2022 12:48 PM  [address] [place] DATE:  RESPONDING  PROVIDER #:        Elizabeth Latham MD          QUERY TEXT:    Pt admitted with cellulitis. Pt noted to have SIRS criteria. If possible,   please document in the progress notes and discharge summary if you are   evaluating and /or treating any of the following: The medical record reflects the following:  Risk Factors: BLE cellulitis  Clinical Indicators: On admission WBC 16.0. .1, Tachycardia 114, BP   100/48 with MAP 65.33, BIN  Treatment: ID consult, 1000ml IVF bolus, IV Rocephin    Thank you,  Mica Stanford MSN, RN, [address] [place] [address]  Options provided:  -- Sepsis, present on admission  -- Sepsis, present on admission, now resolved  -- BLE cellulitis without Sepsis  -- Sepsis was ruled out  -- Other - I will add my own diagnosis  -- Disagree - Not applicable / Not valid  -- Disagree - Clinically unable to determine / Unknown  -- Refer to Clinical Documentation Reviewer    PROVIDER RESPONSE TEXT:    This patient was treated for sepsis this admission, which was present on   admission and is currently resolved.     Query created by: Rebeca Crockett on 2022 10:26 AM      Electronically signed by:  Elizabeth Latham MD 2022 2:33 PM

## 2022-07-20 NOTE — CARE COORDINATION
Ana from [facility] stopped by to see patient. Pre-cert is still pending. AMELIA will continue to follow.      Electronically signed by Shreya Mazariegos on 7/20/22 at 2:51 PM EDT

## 2022-07-21 ENCOUNTER — TELEPHONE (OUTPATIENT)
Dept: INFECTIOUS DISEASES | Age: 72
End: 2022-07-21

## 2022-07-21 VITALS
BODY MASS INDEX: 34.07 KG/M2 | HEIGHT: 63 IN | HEART RATE: 73 BPM | DIASTOLIC BLOOD PRESSURE: 49 MMHG | TEMPERATURE: 97.9 F | WEIGHT: 192.3 LBS | SYSTOLIC BLOOD PRESSURE: 116 MMHG | RESPIRATION RATE: 20 BRPM | OXYGEN SATURATION: 95 %

## 2022-07-21 PROBLEM — I89.0 LYMPHEDEMA OF BOTH LOWER EXTREMITIES: Status: ACTIVE | Noted: 2022-07-21

## 2022-07-21 LAB
ABSOLUTE EOS #: 0.3 K/UL (ref 0–0.4)
ABSOLUTE LYMPH #: 1.5 K/UL (ref 1–4.8)
ABSOLUTE MONO #: 0.9 K/UL (ref 0.1–1.3)
ANION GAP SERPL CALCULATED.3IONS-SCNC: 10 MMOL/L (ref 9–17)
BASOPHILS # BLD: 1 % (ref 0–2)
BASOPHILS ABSOLUTE: 0.1 K/UL (ref 0–0.2)
BUN BLDV-MCNC: 18 MG/DL (ref 8–23)
C-REACTIVE PROTEIN: 22.7 MG/L (ref 0–5)
CALCIUM SERPL-MCNC: 8.7 MG/DL (ref 8.6–10.4)
CHLORIDE BLD-SCNC: 106 MMOL/L (ref 98–107)
CO2: 25 MMOL/L (ref 20–31)
CREAT SERPL-MCNC: 0.67 MG/DL (ref 0.5–0.9)
EOSINOPHILS RELATIVE PERCENT: 3 % (ref 0–4)
GFR AFRICAN AMERICAN: >60 ML/MIN
GFR NON-AFRICAN AMERICAN: >60 ML/MIN
GFR SERPL CREATININE-BSD FRML MDRD: ABNORMAL ML/MIN/{1.73_M2}
GLUCOSE BLD-MCNC: 102 MG/DL (ref 70–99)
GLUCOSE BLD-MCNC: 104 MG/DL (ref 65–105)
GLUCOSE BLD-MCNC: 109 MG/DL (ref 65–105)
HCT VFR BLD CALC: 30.6 % (ref 36–46)
HEMOGLOBIN: 10.3 G/DL (ref 12–16)
LYMPHOCYTES # BLD: 17 % (ref 24–44)
MCH RBC QN AUTO: 31.3 PG (ref 26–34)
MCHC RBC AUTO-ENTMCNC: 33.6 G/DL (ref 31–37)
MCV RBC AUTO: 93.3 FL (ref 80–100)
MONOCYTES # BLD: 10 % (ref 1–7)
PDW BLD-RTO: 13.4 % (ref 11.5–14.9)
PLATELET # BLD: 502 K/UL (ref 150–450)
PMV BLD AUTO: 6.4 FL (ref 6–12)
POTASSIUM SERPL-SCNC: 4.3 MMOL/L (ref 3.7–5.3)
RBC # BLD: 3.28 M/UL (ref 4–5.2)
SEG NEUTROPHILS: 69 % (ref 36–66)
SEGMENTED NEUTROPHILS ABSOLUTE COUNT: 5.9 K/UL (ref 1.3–9.1)
SODIUM BLD-SCNC: 141 MMOL/L (ref 135–144)
WBC # BLD: 8.5 K/UL (ref 3.5–11)

## 2022-07-21 PROCEDURE — 86140 C-REACTIVE PROTEIN: CPT

## 2022-07-21 PROCEDURE — 97116 GAIT TRAINING THERAPY: CPT

## 2022-07-21 PROCEDURE — 85025 COMPLETE CBC W/AUTO DIFF WBC: CPT

## 2022-07-21 PROCEDURE — 99232 SBSQ HOSP IP/OBS MODERATE 35: CPT | Performed by: NURSE PRACTITIONER

## 2022-07-21 PROCEDURE — 96366 THER/PROPH/DIAG IV INF ADDON: CPT

## 2022-07-21 PROCEDURE — 97110 THERAPEUTIC EXERCISES: CPT

## 2022-07-21 PROCEDURE — 2580000003 HC RX 258: Performed by: INTERNAL MEDICINE

## 2022-07-21 PROCEDURE — 6370000000 HC RX 637 (ALT 250 FOR IP): Performed by: NURSE PRACTITIONER

## 2022-07-21 PROCEDURE — 6370000000 HC RX 637 (ALT 250 FOR IP): Performed by: INTERNAL MEDICINE

## 2022-07-21 PROCEDURE — 99239 HOSP IP/OBS DSCHRG MGMT >30: CPT | Performed by: INTERNAL MEDICINE

## 2022-07-21 PROCEDURE — 6370000000 HC RX 637 (ALT 250 FOR IP)

## 2022-07-21 PROCEDURE — 6370000000 HC RX 637 (ALT 250 FOR IP): Performed by: SURGERY

## 2022-07-21 PROCEDURE — 36415 COLL VENOUS BLD VENIPUNCTURE: CPT

## 2022-07-21 PROCEDURE — 80048 BASIC METABOLIC PNL TOTAL CA: CPT

## 2022-07-21 PROCEDURE — 6360000002 HC RX W HCPCS: Performed by: INTERNAL MEDICINE

## 2022-07-21 PROCEDURE — 82947 ASSAY GLUCOSE BLOOD QUANT: CPT

## 2022-07-21 RX ADMIN — BUSPIRONE HYDROCHLORIDE 5 MG: 5 TABLET ORAL at 07:54

## 2022-07-21 RX ADMIN — CARVEDILOL 12.5 MG: 12.5 TABLET, FILM COATED ORAL at 07:54

## 2022-07-21 RX ADMIN — CITALOPRAM HYDROBROMIDE 20 MG: 20 TABLET ORAL at 07:54

## 2022-07-21 RX ADMIN — OXYCODONE HYDROCHLORIDE AND ACETAMINOPHEN 1 TABLET: 5; 325 TABLET ORAL at 07:54

## 2022-07-21 RX ADMIN — Medication: at 07:57

## 2022-07-21 RX ADMIN — FERROUS SULFATE TAB 325 MG (65 MG ELEMENTAL FE) 325 MG: 325 (65 FE) TAB at 07:54

## 2022-07-21 RX ADMIN — OXYCODONE HYDROCHLORIDE AND ACETAMINOPHEN 1 TABLET: 5; 325 TABLET ORAL at 12:17

## 2022-07-21 RX ADMIN — PANTOPRAZOLE SODIUM 40 MG: 40 TABLET, DELAYED RELEASE ORAL at 06:10

## 2022-07-21 RX ADMIN — APIXABAN 5 MG: 5 TABLET, FILM COATED ORAL at 07:54

## 2022-07-21 RX ADMIN — AMLODIPINE BESYLATE 5 MG: 5 TABLET ORAL at 07:54

## 2022-07-21 RX ADMIN — VANCOMYCIN HYDROCHLORIDE 1000 MG: 1 INJECTION, POWDER, LYOPHILIZED, FOR SOLUTION INTRAVENOUS at 06:14

## 2022-07-21 ASSESSMENT — PAIN DESCRIPTION - ORIENTATION
ORIENTATION: RIGHT
ORIENTATION: RIGHT

## 2022-07-21 ASSESSMENT — PAIN DESCRIPTION - LOCATION
LOCATION: LEG;NECK
LOCATION: LEG

## 2022-07-21 ASSESSMENT — PAIN DESCRIPTION - DESCRIPTORS: DESCRIPTORS: SHOOTING;STABBING

## 2022-07-21 ASSESSMENT — PAIN SCALES - GENERAL: PAINLEVEL_OUTOF10: 8

## 2022-07-21 NOTE — PROGRESS NOTES
[facility] - Initial Consult Note  Today's Date and Time: 7/21/2022, 12:43 PM    Impression :   RLE Erisepylas  Cholelithiasis. Leukocytosis  Elevated CRP  BLE Lymphedema. Recent fall. DM  CKD III  Allergy to Sulfa-Confusion. Recommendations:   Monitor off antibiotics per ID recommendations. Discharging on Ceftriaxone IV per primary service, will be managed by primary service. Compression stockings to BLE. Follow up with wound care. Follow-up with Dr. Garcia Just in the office in 2-weeks. Okay to discharge from ID standpoint. Supportive care. Plan of care discussed at length with Dr. Sridhar Zendejas. Medical Decision Making/Summary/Discussion:7/21/2022     Pen G is ideal therapy. Not a good choice due to Na levels, pt. Decreased renal function,hx of CHF. Small break in the skin noted to the right lateral calf. Scant amount of serous drainage. Check blood cultures. RLE improving. Shorten antibiotic course to 10 days. RLE examined at the bedside. Markedly improved from admission where erysepilas extended from midforefoot to midthigh. Pre-tibial erythema noted. Does not look cellulitc, looks to be fluid related. Legs looking much better with ace wraps. No fever, wbc wnl, CRP improved. Patient completed 10-day course of Ceftriaxone yesterday. Vancomycin started yesterday per primary service. CT right femur obtained. No abscess. Showing subcutaneous fat stranding to the thigh and proximal leg compatible with lymphedema, cellulitis. More likely lymphedema as patient has improved on 10-day course of IV antibiotics, erythema decreased to only pretibial, improves with compression stockings. No need for further antibiotics.   Infection Control Recommendations   [place] Precautions    Antimicrobial Stewardship Recommendations     Simplification of therapy  Targeted therapy    Coordination of Outpatient Care:   Estimated Length of IV antimicrobials:10 days until 7/20/22  Patient will need Midline Catheter Insertion: Midline placed 7/13/22  Patient will need PICC line Insertion:BD  Patient will need: Home IV , [place],  SNF,  LTAC: TBD  Patient will need outpatient wound care:    Chief complaint/reason for consultation:   Possible RLE Cellulitis      History of Present Illness:   Lisa Bhatt is a 71y.o.-year-old female who was initially admitted on 7/10/2022. Patient seen at the request of     INITIAL HISTORY:  78-year-old female who presented to the ED s/p fall 2 days ago. Patient states she trying to open a box of food and she slipped and fell. She hit her right side on something, she is not sure what. Angel loss of consciousness. Associated symptoms include left hip pain, pain to her right rib cage and right upper abdomen. No associated symptoms of headache, neck or back pain. She is on blood thinners. CT results reviewed as below. Recent hospitalization 6/5-6/14 for BLE cellulitis. Treated with IV Vancomycin, discharged on Doxycycline x 7 days. Patient presents with BLE redness and swelling. Upon examination patient has erysepilas to the RLE, mid-forefoot to mid-thigh. Light pink in color. No open wounds or areas of skin breaks. Patient states she has had this for about 2-weeks. Denies any pain to the RLE. CURRENT EVALUATION 7/21/2022  BP (!) 145/60   Pulse 75   Temp 98.4 °F (36.9 °C)   Resp 16   Ht 5' 3\" (1.6 m)   Wt 192 lb 4.8 oz (87.2 kg)   SpO2 95%   BMI 34.06 kg/m²   Feeling good. In good spirits. Denies any fever, chills, n/v/d. BLE wrapped in ace wraps toes to knees. Lymphedema to R thigh. May need ace wraps up to the thighs. CT right femur showing no abscess. Subcutaneous fat stranding of the thigh and proximal leg compatible with lymphedema, cellulitis. CRP improved at 22.7.     Labs, X rays reviewed: 7/21/2022    BUN:44>44>48  Cr:1.26>1.25>1.52>1.40>0.79>0.72>0.73>0.63>0.59>0.67>0.69>0.67    WBC:16.0>14.6>11.7>9.5>9.9>8.6>8.8>8.2>7.9>8.8>8.5  Hb:  Plat: 295>225>205>310>490>508>502    CRP:166.1>63.4>22.7    Cultures:  Urine:  7/11 UA-Negative. Blood:  7/12 Negative to date. Sputum :    Wound:    MRSA Nares:      Imaging:  XR HIP 2-3 VW W PELVIS LEFT   Preliminary Result   No acute bony abnormality identified. CT CHEST ABDOMEN PELVIS W CONTRAST   Preliminary Result   No acute traumatic abnormality identified in the chest, abdomen, or pelvis. Subacute mildly displaced proximal sternal body fracture. Age-indeterminate buckle fracture of the right lateral 10th rib. Multiple   old left-sided rib fractures. Distended gallbladder containing a stone at the gallbladder neck. Postsurgical changes in the lower lumbar spine with chronic appearing height   loss of L5.           CT CERVICAL SPINE WO CONTRAST   Final Result   1. Kyphosis of the cervical spine centered at C6-C7. 2. Mild multilevel degenerative changes in the cervical spine primarily at   C5-C6. 3. No acute vertebral body height loss in the cervical spine. 4. Evidence of posterior spinal fusion from C3-C6. RECOMMENDATIONS:   Unavailable           CT FACIAL BONES WO CONTRAST   Final Result   No acute facial bone trauma. CT HEAD WO CONTRAST   Preliminary Result   No acute intracranial abnormality. 7/11 RUQ US  FINDINGS:   LIVER:  The liver demonstrates normal echogenicity without evidence of   intrahepatic biliary ductal dilatation. There is a paddle pedal flow in the   portal vein. Liver 17 cm in length. BILIARY SYSTEM:  Gallstones in the gallbladder. No wall thickening or   pericholecystic fluid. Common bile duct is within normal limits measuring 5.2 mm. RIGHT KIDNEY: The right kidney is grossly unremarkable without evidence of   hydronephrosis.        PANCREAS:  Visualized portions of the pancreas are unremarkable. OTHER: No evidence of right upper quadrant ascites. Impression   Cholelithiasis. No acute findings. RECOMMENDATIONS:   Unavailable             7/20 CT Right Femur  FINDINGS:   Bones: No fracture or dislocation. No osseous erosion or periosteal   reaction. No suspicious lytic or blastic osseous lesion. Soft Tissue: Subcutaneous fat stranding and skin thickening laterally at the   level of the hip. Circumferential subcutaneous fat stranding and skin   thickening in the mid and distal thigh and visualized proximal aspects of the   lower leg. No inguinal lymphadenopathy. The visualized soft tissue contents   of the pelvis are unremarkable. No drainable fluid collection or soft tissue   gas. The visualized musculature is unremarkable. Joint: Moderate pubic symphysis degenerative changes. Mild right hip   degenerative changes. No knee effusion or popliteal cyst.  No significant   degenerative changes of the knee. Impression   1. Subcutaneous fat stranding of the thigh and proximal lower leg as above   compatible with lymph edema or cellulitis. No abscess or soft tissue gas. 2. No acute osseous abnormality. Discussed with patient, RN. I have personally reviewed the past medical history, past surgical history, medications, social history, and family history, and I have updated the database accordingly. Past Medical History:     Past Medical History:   Diagnosis Date    Allergic rhinitis, cause unspecified     Back pain     lumbar    Bowel obstruction (HCC)     history of due to scar tissue, resolved non-surgically    C. difficile diarrhea     CAD (coronary artery disease)     no stent needed per pt.  Dr. Derian Whitehead did cath at  2005    Cardiac murmur     Cellulitis     left leg    Cellulitis 2017 August    leg left leg/bug bite    Cerebral artery occlusion with cerebral infarction ([facility] [address]     TIA 2014    COVID-19     ONE YR AGO IN 4/25/2020 fever and cough    Diverticulosis of colon (without mention of hemorrhage)     GERD (gastroesophageal reflux disease)     GERD (gastroesophageal reflux disease)     on rx    History of blood transfusion     approx 2020        History of CHF (congestive heart failure)     History of MI (myocardial infarction) 2005    thought due to a blood clot    History of ovarian cyst 1970    had oopherectomy holly    History of peritonitis 1968    due to ruptured appendix age 13    HTN (hypertension)     Hx of blood clots     right leg    Hyperlipidemia     Intestinal or peritoneal adhesions with obstruction (postoperative) (postinfection) ([address]     Kidney infection     renal failure/sepsis/spider bite    Lateral epicondylitis  of elbow     MDRO (multiple drug resistant organisms) resistance     c diff    Muscle strain     right posterior shoulder    Other abnormal glucose     PONV (postoperative nausea and vomiting)     dry heaves    Pre-diabetes     Restless legs syndrome (RLS)     Snores     no cpap    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)     TIA (transient ischemic attack) 2014    Uses walker     Vitamin D deficiency     Wears glasses     Wellness examination     last seen 2 weeks ago       Past Surgical  History:     Past Surgical History:   Procedure Laterality Date    ABDOMEN SURGERY  1976    benign tumor removed near remaining ovary, 1.5 pounds    APPENDECTOMY  1968    appendix ruptured, developed peritonitis    BACK SURGERY      BUNIONECTOMY Left     along with calcium deposits removed    CARDIAC CATHETERIZATION      CARDIAC CATHETERIZATION  2005    negative    CERVICAL FUSION  05/21/2021    POSTERIOR C3-6 LAMINECTOMY, PARTIAL C7 LAMINECTOMY, FUSION C3-C6, SILVERCORD    CERVICAL FUSION N/A 5/21/2021    POSTERIOR C3-6 LAMINECTOMY, PARTIAL C7 LAMINECTOMY, FUSION C3-C6, SILVERCORD performed by Trisha Pang DO at [address] [place] [address]    12 INCHES REMOVED D/T OBSTRUCTION    COLONOSCOPY years: 10.00     Types: Cigarettes     Start date: 1995     Quit date: 2017     Years since quittin.0    Smokeless tobacco: Never   Vaping Use    Vaping Use: Never used   Substance and Sexual Activity    Alcohol use: No     Alcohol/week: 0.0 standard drinks    Drug use: No    Sexual activity: Not on file   Other Topics Concern    Not on file   Social History Narrative    Not on file     Social Determinants of Health     Financial Resource Strain: Not on file   Food Insecurity: Not on file   Transportation Needs: Not on file   Physical Activity: Not on file   Stress: Not on file   Social Connections: Not on file   Intimate Partner Violence: Not on file   Housing Stability: Not on file       Family History:     Family History   Problem Relation Age of Onset    Stroke Mother     Diabetes Mother     Heart Disease Mother     High Blood Pressure Mother     Heart Disease Father     Heart Disease Brother     High Blood Pressure Brother     Heart Disease Maternal Grandmother     High Blood Pressure Sister         Allergies:   Bactrim [sulfamethoxazole-trimethoprim], Codeine, Diazepam, Meperidine hcl, and Seasonal     Review of Systems:   Constitutional: No fevers or chills. No systemic complaints  Head: No headaches  Eyes: No double vision or blurry vision. No conjunctival inflammation. R orbit echymotic. ENT: No sore throat or runny nose. Cardiovascular: No chest pain or palpitations. No shortness of breath. No HENDERSON  Lung: No shortness of breath or cough. No sputum production  Abdomen: No nausea, vomiting, diarrhea, or abdominal pain. Lawernce Roughen No cramps. Genitourinary: No increased urinary frequency, or dysuria. No hematuria. No suprapubic or CVA pain  Musculoskeletal:c/o pain to broken rib. Hematologic: No bleeding or bruising. Neurologic: No headache, weakness, numbness, or tingling. Integument: No rash, no ulcers. . Itching to RLE. Psychiatric: No depression.    Endocrine: No polyuria, no polydipsia, no polyphagia. Physical Examination :     Patient Vitals for the past 8 hrs:   BP Temp Pulse Resp SpO2   07/21/22 1217 -- -- -- 16 --   07/21/22 0754 (!) 145/60 -- 75 16 --   07/21/22 0609 (!) 145/60 98.4 °F (36.9 °C) 78 16 95 %       General Appearance: Awake, alert, and in no apparent distress  Head:  Normocephalic, right orbit ecchymotic from fall. Eyes: Pupils equal, round, reactive to light and accommodation; extraocular movements intact; sclera anicteric; conjunctivae pink. ENT: Oropharynx clear, without erythema, exudate, or thrush. No tenderness of sinuses. Mouth/throat: mucosa pink and moist.   Neck:Supple, without lymphadenopathy. No JVD, tracheal deviation. Pulmonary/Chest: Clear to auscultation, without wheezes, rales, or rhonchi. Cardiovascular: Regular rate and rhythm without murmurs, rubs, or gallops. Abdomen: Soft, non tender. Bowel sounds normal.   All four Extremities: No cyanosis, clubbing Lymphedema noted to R thigh. Neurologic: No gross sensory or motor deficits. Skin: Warm and dry with good turgor. RLE pink with pretibial erythema. Medical Decision Making -Laboratory:   I have independently reviewed/ordered the following labs:    CBC with Differential:   Recent Labs     07/20/22  0628 07/21/22  0548   WBC 8.8 8.5   HGB 10.8* 10.3*   HCT 32.1* 30.6*   * 502*   LYMPHOPCT 19* 17*   MONOPCT 9* 10*       BMP:   Recent Labs     07/20/22  0628 07/21/22  0548    141   K 4.3 4.3    106   CO2 25 25   BUN 14 18   CREATININE 0.69 0.67       Hepatic Function Panel:   No results for input(s): PROT, LABALBU, BILIDIR, IBILI, BILITOT, ALKPHOS, ALT, AST in the last 72 hours. No results for input(s): RPR in the last 72 hours. No results for input(s): HIV in the last 72 hours. No results for input(s): BC in the last 72 hours.   Lab Results   Component Value Date/Time    MUCUS NOT REPORTED 08/21/2019 10:00 PM    RBC 3.28 07/21/2022 05:48 AM    TRICHOMONAS NOT REPORTED 08/21/2019 10:00 PM    WBC 8.5 07/21/2022 05:48 AM    YEAST NOT REPORTED 08/21/2019 10:00 PM    TURBIDITY Clear 07/11/2022 06:13 PM     Lab Results   Component Value Date/Time    CREATININE 0.67 07/21/2022 05:48 AM    GLUCOSE 102 07/21/2022 05:48 AM       Medical Decision Making-Imaging:       Medical Decision [id number]:       Medical Decision Making-Other:     Note:  Labs, medications, radiologic studies were reviewed with personal review of films  Large amounts of data were reviewed  Discussed with nursing Staff, Discharge planner  Infection Control and Prevention measures reviewed  All prior entries were reviewed  Administer medications as ordered  Prognosis: Good  Discharge planning reviewed  Follow up as outpatient. Thank you for allowing us to participate in the care of this patient. Please call with questions.     MAIK Elias - CNP    Perfect Serve/Office: [phone]

## 2022-07-21 NOTE — CARE COORDINATION
Authorization obtained for Patient to go to [facility]. AMELIA notified Nadya Downing RN. She will inform SW if it is okay for Patient to D/C today. AMELIA Set transport up for 3:00pm via [id number] [place] [address], Will cancel if needed. Electronically signed by Danish Smith on 7/21/22 at 9:31 AM EDT     Addendum: AMELIA met with patient, IMM signed 9:41 am. AMELIA informed her that we did receive Authorization for the [facility] and that she would be leaving today at 3:00 pm unless the physician says otherwise. Patient asked if it was possible to go to the [facility], and AMELIA reminded her that they do not take her insurance, Patient expressed understanding. AMELIA informed Facility of time as well but informed them it could change if the physician is not in agreement with D/C today. They are also aware Of [facility] with IV Antibiotics, they stated this was ok.      Please complete ANNALEE     Call to report: [phone]

## 2022-07-21 NOTE — PROGRESS NOTES
Pt is being discharged to [facility] the pt was going to SNF with [facility] in [facility]. Writer will call report to [employer].

## 2022-07-21 NOTE — PLAN OF CARE
Problem: Discharge Planning  Goal: Discharge to home or other facility with appropriate resources  7/21/2022 0444 by Tere Carbone RN  Outcome: Progressing  7/20/2022 1825 by Miladis Reyes RN  Outcome: Progressing  Flowsheets (Taken 7/20/2022 8544)  Discharge to home or other facility with appropriate resources:   Identify barriers to discharge with patient and caregiver   Identify discharge learning needs (meds, wound care, etc)     Problem: Pain  Goal: Verbalizes/displays adequate comfort level or baseline comfort level  7/21/2022 0444 by Tere Carbone RN  Outcome: Progressing  7/20/2022 1825 by Miladis Reyes RN  Outcome: Progressing     Problem: Safety - Adult  Goal: Free from fall injury  7/21/2022 0444 by Tere Carbone RN  Outcome: Progressing  7/20/2022 1825 by Miladis Reyes RN  Outcome: Progressing  Flowsheets (Taken 7/20/2022 0837)  Free From Fall Injury: Instruct family/caregiver on patient safety     Problem: ABCDS Injury Assessment  Goal: Absence of physical injury  7/21/2022 0444 by Tere Carbone RN  Outcome: Progressing  7/20/2022 1825 by Miladis Reyes RN  Outcome: Progressing  Flowsheets (Taken 7/20/2022 9531)  Absence of Physical Injury: Implement safety measures based on patient assessment     Problem: Chronic Conditions and Co-morbidities  Goal: Patient's chronic conditions and co-morbidity symptoms are monitored and maintained or improved  7/21/2022 0444 by Tere Carbone RN  Outcome: Progressing  7/20/2022 1825 by Miladis Reyes RN  Outcome: Progressing  Flowsheets (Taken 7/20/2022 0850)  Care Plan - Patient's Chronic Conditions and Co-Morbidity Symptoms are Monitored and Maintained or Improved: Monitor and assess patient's chronic conditions and comorbid symptoms for stability, deterioration, or improvement     Problem: Skin/Tissue Integrity  Goal: Absence of new skin breakdown  Description: 1. Monitor for areas of redness and/or skin breakdown  2.   Assess vascular access sites hourly  3. Every 4-6 hours minimum:  Change oxygen saturation probe site  4. Every 4-6 hours:  If on nasal continuous positive airway pressure, respiratory therapy assess nares and determine need for appliance change or resting period.   7/21/2022 0444 by Dayanna Condon RN  Outcome: Progressing  7/20/2022 1825 by Ori Moss RN  Outcome: Progressing

## 2022-07-21 NOTE — PROGRESS NOTES
Vancomycin Dosing by Pharmacy - Daily Note   Vancomycin Therapy Day:  2  Indication: SSTI, MRSA suspected    Allergies:  Bactrim [sulfamethoxazole-trimethoprim], Codeine, Diazepam, Meperidine hcl, and Seasonal   Actual Weight:    Wt Readings from Last 1 Encounters:   07/21/22 192 lb 4.8 oz (87.2 kg)       Labs/Ancillary Data  Estimated Creatinine Clearance: 81 mL/min (based on SCr of 0.67 mg/dL). Recent Labs     07/19/22  0539 07/20/22  0628 07/21/22  0548   CREATININE 0.67 0.69 0.67   BUN 14 14 18   WBC 7.9 8.8 8.5     No results found for: PROCAL    Intake/Output Summary (Last 24 hours) at 7/21/2022 1108  Last data filed at 7/21/2022 0829  Gross per 24 hour   Intake --   Output 850 ml   Net -850 ml     Temp: 98.4 F    Culture Date / Source  /  Results  See Micro  Recent vancomycin administrations                     vancomycin 1000 mg IVPB in 250 mL D5W addavial (mg) 1,000 mg New Bag 07/21/22 0614    vancomycin (VANCOCIN) 2,250 mg in dextrose 5 % 500 mL IVPB (mg) 2,250 mg New Bag 07/20/22 1705                    Vancomycin Concentrations:   TROUGH:  No results for input(s): VANCOTROUGH in the last 72 hours. RANDOM:  No results for input(s): VANCORANDOM in the last 72 hours. MRSA Nasal Swab: N/A. Non-respiratory infection. Toña Red PLAN     Continue current dose of 1000 mg q12h IV  Ensured BUN/sCr ordered at baseline and every 48 hours x at least 3 levels, then at least weekly.   Repeat vancomycin concentration ordered for 7/22 @ 0500   Pharmacy will continue to monitor patient and adjust therapy as indicated      Vancomycin Target Concentration Parameters  Treatment  Population Target AUC/JOJO Target Trough   Invasive MRSA Infection (bacteremia, pneumonia, meningitis, endocarditis, osteomyelitis)  Sepsis (undifferentiated) 400-600 N/A   Infection due to non-MRSA pathogen  Empiric treatment of non-invasive MRSA infection  (SSTI, UTI) <500 10-15 mg/L   CrCl < 29 mL/min  Rapidly fluctuating serum creatinine   BIN N/A < 15 mg/L     Renal replacement therapy is dosed by levels, per hospital protocol. Abbreviations  * Pauc: probability that AUC is >400 (efficacy); Pconc: probability that Ctrough is above 20 ?g/mL (toxicity); Tox: Probability of nephrotoxicity, based on Monse et al. Clin Infect Dis 2009. Loading dose: 2250 mg at 16:00 07/20/2022. Regimen: 1000 mg IV every 12 hours. Start time: 18:14 on 07/21/2022  Exposure target: AUC24 (range)400-600 mg/L.hr   AUC24,ss: 490 mg/L.hr  Probability of AUC24 > 400: 70 %  Ctrough,ss: 15.6 mg/L  Probability of Ctrough,ss > 20: 30 %  Probability of nephrotoxicity (Monse VERONICA 2009): 11 %      Thank you for the consult. Pharmacy will continue to follow.      Moisés Cooley, PharmD, STEVE  PGY1 Pharmacy Resident  7/21/2022 11:08 AM

## 2022-07-21 NOTE — DISCHARGE SUMMARY
[place] [address] Internal Medicine    Discharge Summary     Patient ID: Nisreen Menendez  :  1950   MRN: [id number]     ACCOUNT:  [de-identified]   Patient's PCP: Fredi Gusman MD  Admit Date: 7/10/2022   Discharge Date: 2022    Length of Stay: 4  Code Status:  Full Code  Admitting Physician: Milena Bazan MD  Discharge Physician: Sotero Medina MD     Active Discharge Diagnoses:     Primary Problem  Fracture of body of sternum, initial encounter for open fracture      [place] Problems    Diagnosis Date Noted    Bilateral cellulitis of lower leg [L03.116, L03.115] 2022     Priority: Medium    Erysipelas of right lower extremity [A46]      Priority: Medium    Closed fracture of body of sternum [S22.22XA]      Priority: Medium    Calculus of gallbladder without cholecystitis without obstruction [K80.20]      Priority: Medium    Leukocytosis [D72.829]      Priority: Medium    Type 2 diabetes mellitus with stage 3 chronic kidney disease ([address] [E11.22, N18.30]      Priority: Medium    Allergy to sulfa drugs [Z88.2]      Priority: Medium    Fracture of body of sternum, initial encounter for open fracture [S22.22XB] 07/10/2022     Priority: Medium    Nondisplaced fracture of sternal end of left clavicle, initial encounter for closed fracture [S42.018A] 07/10/2022     Priority: Medium    Type 2 diabetes mellitus with circulatory disorder, without long-term current use of insulin ([address] [E11.59] 2019    Deep vein thrombosis (DVT) of right lower extremity ([address] [I82.401] 2017       Admission Condition:  fair     Discharged Condition: fair    Hospital Stay:     Hospital Course:  Nisreen Menendez is a 71 y.o. female who was admitted for the management of Fracture of body of sternum, initial encounter for open fracture , presented to ER with Fall, Head Injury, Shortness of Breath, and Hip Pain (left)  78-year-old lady class I obese BMI 34.06 with history of recurrent right leg cellulitis  She was discharged a month ago after IV antibiotics and on oral doxycycline she has history of DVT she is on Eliquis and she is compliant on this admission her right leg was swollen from ankle to up to thigh warm and red patient received IV antibiotics and consultation with infectious disease for 11 days she received IV antibiotics CT scan of the right thigh was done with contrast to rule out any abscess which was negative DVT scan was done which was negative for any DVT patient also had a CT abdomen pelvis with IV contrast did not show any pelvic pathology  Given patient has diastolic congestive heart failure also on Norvasc 5 mg could contribute to the leg edema with uncontrolled diabetes mellitus and obesity  It has been very challenging to treat the right leg cellulitis although there was no fever on discharge white cell is normal the leg still looks red and swollen vascular input was obtained diet Ace wraps was advised leg elevation discontinue Norvasc IV antibiotics for 7 more days with a right arm PICC line discharged to extended-care facility  Given all above risk factors patient is high risk of mortality morbidity and readmission        Significant therapeutic interventions:     Significant Diagnostic Studies:   Labs / Micro:        ,     Radiology:    XR CHEST (2 VW)    Result Date: 7/11/2022  EXAMINATION: TWO XRAY VIEWS OF THE CHEST 7/11/2022 11:00 am COMPARISON: June 5, 2022 HISTORY: ORDERING SYSTEM PROVIDED HISTORY: sternal Fx TECHNOLOGIST PROVIDED HISTORY: sternal Fx Reason for Exam: recent fall, sob FINDINGS: Left basilar atelectasis or infiltrate. No pneumothorax. Right lung clear. Cardiac size stable. Mediastinum unremarkable. No acute osseous abnormality. Healing midbody sternal fracture better evaluated on previous CT. Left basilar atelectasis or infiltrate. Healing sternal fracture.      CT HEAD WO CONTRAST    Result Date: 7/11/2022  EXAMINATION: CT OF THE HEAD WITHOUT CONTRAST  7/10/2022 2:12 pm TECHNIQUE: CT of the head was performed without the administration of intravenous contrast. Automated exposure control, iterative reconstruction, and/or weight based adjustment of the mA/kV was utilized to reduce the radiation dose to as low as reasonably achievable. COMPARISON: 06/05/2022 HISTORY: ORDERING SYSTEM PROVIDED HISTORY: fall TECHNOLOGIST PROVIDED HISTORY: fall Decision Support Exception - unselect if not a suspected or confirmed emergency medical condition->Emergency Medical Condition (MA) Reason for Exam: fall x 2 days ago FINDINGS: BRAIN/VENTRICLES: There is no acute intracranial hemorrhage, mass effect or midline shift. No abnormal extra-axial fluid collection. The gray-white differentiation is maintained. There is no evidence of hydrocephalus. ORBITS: The visualized portion of the orbits demonstrate no acute abnormality. SINUSES: Partial opacification of the right maxillary sinus. Similar appearance of an ossified lesion in the right maxillary sinus. SOFT TISSUES/SKULL:  No acute abnormality of the visualized skull or soft tissues. No acute intracranial abnormality. CT FACIAL BONES WO CONTRAST    Result Date: 7/10/2022  EXAMINATION: CT OF THE FACE WITHOUT CONTRAST  7/10/2022 2:20 pm TECHNIQUE: CT of the face was performed without the administration of intravenous contrast. Multiplanar reformatted images are provided for review. Automated exposure control, iterative reconstruction, and/or weight based adjustment of the mA/kV was utilized to reduce the radiation dose to as low as reasonably achievable.  COMPARISON: Head CT 12/22/2021 and 05/30/2019 HISTORY: ORDERING SYSTEM PROVIDED HISTORY: Fall TECHNOLOGIST PROVIDED HISTORY: Fall Decision Support Exception - unselect if not a suspected or confirmed emergency medical condition->Emergency Medical Condition (MA) Reason for Exam: fall, left sided facial pain Additional signs and symptoms: left sided contusion FINDINGS: FACIAL BONES: The frontal sinuses, orbital walls, maxilla, pterygoid plates, zygomatic arches, hard palate, nasal bones and mandible are intact. The temporomandibular joints are aligned. ORBITAL CONTENTS: The globes appear intact. The extraocular muscles, optic nerve sheath complexes and lacrimal glands appear unremarkable. No retrobulbar hematoma or mass is seen. SINUSES: Mild mucosal thickening right left maxillary sinuses. Prominent ossified density extending from the floor of the right maxillary sinus typical of exostosis. There is no evidence of acute sinusitis, such as air fluid level. The mastoid air cells are clear. SOFT TISSUES: No superficial facial soft tissue swelling is seen. No acute facial bone trauma. Mild nonspecific inflammatory changes bilateral maxillary sinuses. CT CERVICAL SPINE WO CONTRAST    Result Date: 7/10/2022  EXAMINATION: CT OF THE CERVICAL SPINE WITHOUT CONTRAST 7/10/2022 2:22 pm TECHNIQUE: CT of the cervical spine was performed without the administration of intravenous contrast. Multiplanar reformatted images are provided for review. Automated exposure control, iterative reconstruction, and/or weight based adjustment of the mA/kV was utilized to reduce the radiation dose to as low as reasonably achievable. COMPARISON: 06/05/2022 HISTORY: ORDERING SYSTEM PROVIDED HISTORY: Fall TECHNOLOGIST PROVIDED HISTORY: Fall Decision Support Exception - unselect if not a suspected or confirmed emergency medical condition->Emergency Medical Condition (MA) Reason for Exam: fall Additional signs and symptoms: neck pain Relevant Medical/Surgical History: hx of neck surgery 52-year-old female with history of fall and history of neck surgery. FINDINGS: BONES/ALIGNMENT: Cervical spine is imaged from the skull base to the lower T3 vertebral body level on the sagittal reconstructions. Kyphosis of the cervical spine centered at C6-C7.  Odontoid appears intact. Lateral masses symmetric in appearance. Occipital condyles articulate properly with the lateral masses. Axial images demonstrate no clear evidence for acute fracture within the cervical spine. DEGENERATIVE CHANGES: Mild multilevel disc space narrowing and hypertrophic osteophyte spur formation most notably at C5-C6. Posterior spinal fusion with bilateral rods and interpedicular screws extending from the C3 to C6 vertebral body level. Moderate to severe degenerative changes of the atlantodental and craniocervical junction. SOFT TISSUES: There is no prevertebral soft tissue swelling. Atherosclerotic calcification of the aortic arch. Bilateral carotid vascular calcifications. 1. Kyphosis of the cervical spine centered at C6-C7. 2. Mild multilevel degenerative changes in the cervical spine primarily at C5-C6. 3. No acute vertebral body height loss in the cervical spine. 4. Evidence of posterior spinal fusion from C3-C6. RECOMMENDATIONS: Unavailable     US GALLBLADDER RUQ    Result Date: 7/11/2022  EXAMINATION: RIGHT UPPER QUADRANT ULTRASOUND 7/11/2022 11:32 am COMPARISON: None. HISTORY: ORDERING SYSTEM PROVIDED HISTORY: ruq pain TECHNOLOGIST PROVIDED HISTORY: ruq pain FINDINGS: LIVER:  The liver demonstrates normal echogenicity without evidence of intrahepatic biliary ductal dilatation. There is a paddle pedal flow in the portal vein. Liver 17 cm in length. BILIARY SYSTEM:  Gallstones in the gallbladder. No wall thickening or pericholecystic fluid. Common bile duct is within normal limits measuring 5.2 mm. RIGHT KIDNEY: The right kidney is grossly unremarkable without evidence of hydronephrosis. PANCREAS:  Visualized portions of the pancreas are unremarkable. OTHER: No evidence of right upper quadrant ascites. Cholelithiasis. No acute findings.  RECOMMENDATIONS: Unavailable     CT FEMUR RIGHT W CONTRAST    Result Date: 7/20/2022  EXAMINATION: CT OF THE RIGHT FEMUR WITH CONTRAST 7/20/2022 3:32 pm TECHNIQUE: CT of the right femur was performed with the administration of intravenous contrast.  Multiplanar reformatted images are provided for review. Automated exposure control, iterative reconstruction, and/or weight based adjustment of the mA/kV was utilized to reduce the radiation dose to as low as reasonably achievable. COMPARISON: CT bilateral lower extremity angiogram 10/07/2019. HISTORY ORDERING SYSTEM PROVIDED HISTORY: right thigh and lower leg painful red failed many days of iv abx rule out abscess TECHNOLOGIST PROVIDED HISTORY: right thigh and lower leg painful red failed many days of iv abx rule out abscess Reason for Exam: CELLULITIS, INFECTION RT FEMUR FINDINGS: Bones: No fracture or dislocation. No osseous erosion or periosteal reaction. No suspicious lytic or blastic osseous lesion. Soft Tissue: Subcutaneous fat stranding and skin thickening laterally at the level of the hip. Circumferential subcutaneous fat stranding and skin thickening in the mid and distal thigh and visualized proximal aspects of the lower leg. No inguinal lymphadenopathy. The visualized soft tissue contents of the pelvis are unremarkable. No drainable fluid collection or soft tissue gas. The visualized musculature is unremarkable. Joint: Moderate pubic symphysis degenerative changes. Mild right hip degenerative changes. No knee effusion or popliteal cyst.  No significant degenerative changes of the knee. 1. Subcutaneous fat stranding of the thigh and proximal lower leg as above compatible with lymph edema or cellulitis. No abscess or soft tissue gas. 2. No acute osseous abnormality.      VL Lower Extremity Bilateral Venous Duplex    Result Date: 7/13/2022    [facility]  Vascular Lower Extremities DVT Study Procedure   Patient Name      Holly Lundberg Date of Study             07/12/2022                    K   Date of Birth     1950   Gender                    Female   Age               71 year(s)   Race                         Room Number       2117   Corporate ID #    [id number]   Patient Acct #    [de-identified]   MR #              [id number]       Daisy العراقي   Accession #       [id number]   Interpreting Physician    Micah Whitt   Referring Nurse                Referring Physician       Manjeet Hernandez  Practitioner  Procedure Type of Study:   Veins: Lower Extremities DVT Study, Venous Scan Lower Bilateral.  Indications for Study:Leg Swelling. Patient Status: In Patient. Technical Quality:Limited visualization. Limitation reason:Edema. Conclusions   Summary   Bilateral:  Technically limited study as stated above however in the visualized areas  no superficial or deep vein thrombosis was identified. Signature   ----------------------------------------------------------------  Electronically signed by James Stephens(Sonographer) on  07/12/2022 07:52 AM  ----------------------------------------------------------------   ----------------------------------------------------------------  Electronically signed by Micah Whitt(Interpreting physician)  on 07/13/2022 07:32 AM  ----------------------------------------------------------------  Findings:   Right Impression:                    Left Impression:  Non visualization of the peroneal    Non visualization of the peroneal  veins. veins. Remaining deep veins                 Remaining deep veins  demonstrate normal compressibility. demonstrate normal compressibility. Normal compressibility of the great  Normal compressibility of the great  saphenous vein. saphenous vein. Normal compressibility of the small  Normal compressibility of the small  saphenous vein. saphenous vein.   Velocities are measured in cm/s ; Diameters are measured in cm Right Lower Extremities DVT Study Measurements Right 2D Measurements +------------------------------------+----------+---------------+----------+ ! Location                            ! Visualized! Compressibility! Thrombosis! +------------------------------------+----------+---------------+----------+ ! Common Femoral                      !Yes       ! Yes            ! None      ! +------------------------------------+----------+---------------+----------+ ! Prox Femoral                        !Yes       ! Yes            ! None      ! +------------------------------------+----------+---------------+----------+ ! Mid Femoral                         !Yes       ! Yes            ! None      ! +------------------------------------+----------+---------------+----------+ ! Dist Femoral                        !Yes       ! Yes            ! None      ! +------------------------------------+----------+---------------+----------+ ! Popliteal                           !Yes       ! Yes            ! None      ! +------------------------------------+----------+---------------+----------+ ! Sapheno Femoral Junction            ! Yes       ! Yes            ! None      ! +------------------------------------+----------+---------------+----------+ ! PTV                                 ! Partial   !Yes            ! None      ! +------------------------------------+----------+---------------+----------+ ! Peroneal                            !No        !               !          ! +------------------------------------+----------+---------------+----------+ ! Gastroc                             ! Partial   !Yes            ! None      ! +------------------------------------+----------+---------------+----------+ ! GSV Thigh                           ! Yes       ! Yes            ! None      ! +------------------------------------+----------+---------------+----------+ ! GSV Knee                            ! Yes       ! Yes            ! None      ! +------------------------------------+----------+---------------+----------+ ! GSV Ankle !Yes       !Yes            ! None      ! +------------------------------------+----------+---------------+----------+ ! SSV                                 ! Partial   !Yes            ! None      ! +------------------------------------+----------+---------------+----------+ Right Doppler Measurements +---------------------------+------+------+--------------------------------+ ! Location                   ! Signal!Reflux! Reflux (msec)                   ! +---------------------------+------+------+--------------------------------+ ! Common Femoral             !Phasic!      !                                ! +---------------------------+------+------+--------------------------------+ ! Prox Femoral               !Phasic!      !                                ! +---------------------------+------+------+--------------------------------+ ! Popliteal                  !Phasic!      !                                ! +---------------------------+------+------+--------------------------------+ Left Lower Extremities DVT Study Measurements Left 2D Measurements +------------------------------------+----------+---------------+----------+ ! Location                            ! Visualized! Compressibility! Thrombosis! +------------------------------------+----------+---------------+----------+ ! Common Femoral                      !Yes       ! Yes            ! None      ! +------------------------------------+----------+---------------+----------+ ! Prox Femoral                        !Yes       ! Yes            ! None      ! +------------------------------------+----------+---------------+----------+ ! Mid Femoral                         !Partial   !Yes            ! None      ! +------------------------------------+----------+---------------+----------+ ! Dist Femoral                        !Yes       ! Yes            ! None      ! +------------------------------------+----------+---------------+----------+ ! Popliteal !Yes       !Yes            ! None      ! +------------------------------------+----------+---------------+----------+ ! Sapheno Femoral Junction            ! Yes       ! Yes            ! None      ! +------------------------------------+----------+---------------+----------+ ! PTV                                 ! Partial   !Yes            ! None      ! +------------------------------------+----------+---------------+----------+ ! Peroneal                            !No        !               !          ! +------------------------------------+----------+---------------+----------+ ! Gastroc                             ! Partial   !Yes            ! None      ! +------------------------------------+----------+---------------+----------+ ! GSV Thigh                           ! Yes       ! Yes            ! None      ! +------------------------------------+----------+---------------+----------+ ! GSV Knee                            ! Yes       ! Yes            ! None      ! +------------------------------------+----------+---------------+----------+ ! GSV Ankle                           ! Yes       ! Yes            ! None      ! +------------------------------------+----------+---------------+----------+ ! SSV                                 ! Partial   !Yes            ! None      ! +------------------------------------+----------+---------------+----------+ Left Doppler Measurements +---------------------------+------+------+--------------------------------+ ! Location                   ! Signal!Reflux! Reflux (msec)                   ! +---------------------------+------+------+--------------------------------+ ! Common Femoral             !Phasic!      !                                ! +---------------------------+------+------+--------------------------------+ ! Prox Femoral               !Phasic!      !                                ! +---------------------------+------+------+--------------------------------+ ! Popliteal !Phasic!      !                                ! +---------------------------+------+------+--------------------------------+    NM HEPATOBILIARY SCAN W PHARMACOLOGICAL INTERVENTION    Result Date: 7/11/2022  EXAMINATION: NUCLEAR MEDICINE HEPATOBILIARY SCINTIGRAPHY (HIDA SCAN). 7/11/2022 11:31 am TECHNIQUE: Approximately 4.9 mCi Tc-99m Mebrofenin (Choletec) was administered IV. Then, dynamic images of the abdomen were obtained in the anterior projection for 60 min(s). A right lateral view was also obtained at 60 min(s). The patient was in pain at the end of the 1st hour due to recent fractures. The patient was returned to her room and pain medications were given. The patient than return for a 2 hour image. COMPARISON: CT scan 07/10/2022 HISTORY: ORDERING SYSTEM PROVIDED HISTORY: acute cholecystitis TECHNOLOGIST PROVIDED HISTORY: acute cholecystitis Reason for Exam: acute cholecystitis FINDINGS: Prompt, homogenous uptake by the liver is noted with normal appearance of radiotracer excretion into the biliary system. Clearance of blood pool activity appears appropriate. Normal bowel activity was seen. There is probable accumulation of radiotracer within the gallbladder during the 1st hour, confirmed on the delayed images. The common bile duct and cystic duct are patent. No acute cholecystitis. CT CHEST ABDOMEN PELVIS W CONTRAST    Result Date: 7/11/2022  EXAMINATION: CT OF THE CHEST, ABDOMEN, AND PELVIS WITH CONTRAST 7/10/2022 2:00 pm TECHNIQUE: CT of the chest, abdomen and pelvis was performed with the administration of intravenous contrast. Multiplanar reformatted images are provided for review. Automated exposure control, iterative reconstruction, and/or weight based adjustment of the mA/kV was utilized to reduce the radiation dose to as low as reasonably achievable. COMPARISON: CT chest 04/09/2022.  HISTORY: ORDERING SYSTEM PROVIDED HISTORY: Fall TECHNOLOGIST PROVIDED HISTORY: Fall Decision Support height loss of L5. XR HIP 2-3 VW W PELVIS LEFT    Result Date: 7/11/2022  EXAMINATION: ONE X-RAY VIEW OF THE PELVIS AND TWO X-RAY VIEWS LEFT HIP 7/10/2022 2:58 pm COMPARISON: 05/22/2021 HISTORY: ORDERING SYSTEM PROVIDED HISTORY:  Fall TECHNOLOGIST PROVIDED HISTORY: Fall Reason for Exam:  Fall, left hip pain FINDINGS: Generalized osteopenia limits evaluation for nondisplaced fractures. Within this limitation, no acute fracture is identified. No evidence of dislocation. Mild to moderate bilateral hip joint degenerative changes. Again seen are postsurgical changes from lower spinal fusion. There is contrast in the urinary bladder. No acute bony abnormality identified. Consultations:    Consults:     Final Specialist Recommendations/Findings:   IP CONSULT TO GENERAL SURGERY  IP CONSULT TO INTERNAL MEDICINE  IP CONSULT TO INFECTIOUS DISEASES  IP CONSULT TO PHYSICAL MEDICINE REHAB  IP CONSULT TO VASCULAR SURGERY  PHARMACY TO DOSE VANCOMYCIN      The patient was seen and examined on day of discharge and this discharge summary is in conjunction with any daily progress note from day of discharge. Discharge plan:     Disposition: ecf    Physician Follow Up:     [facility]  [address] [facility]  [address]  [address]    They will call you at home to set up a time to come to your house.     Ofelia Lucashl, [address] [facility]  [phone]    Schedule an appointment as soon as possible for a visit  For wound re-check    Raul Richards MD  [facility] [address] [place] [address]  [phone]             Requiring Further Evaluation/Follow Up POST HOSPITALIZATION/Incidental Findings:    Diet: cardiac diet    Activity: As tolerated    Instructions to Patient:     Discharge Medications:      Medication List        START taking these medications      cefTRIAXone  infusion  Commonly known as: ROCEPHIN  Infuse 1,000 mg intravenously every 24 hours for 6 days Compound per protocol     Hydrocerin Crea cream  Apply topically 2 times daily     pantoprazole 40 MG tablet  Commonly known as: PROTONIX  Take 1 tablet by mouth every morning (before breakfast)            CONTINUE taking these medications      acetaminophen 325 MG tablet  Commonly known as: TYLENOL  Take 2 tablets by mouth every 4 hours as needed for Pain     albuterol-ipratropium  MCG/ACT Aers inhaler  Commonly known as: COMBIVENT RESPIMAT  Inhale 1 puff into the lungs every 6 hours as needed for Wheezing or Shortness of Breath     apixaban 5 MG Tabs tablet  Commonly known as: Eliquis  Take 1 tablet by mouth 2 times daily     atorvastatin 40 MG tablet  Commonly known as: LIPITOR  Take 1 tablet by mouth nightly     busPIRone 5 MG tablet  Commonly known as: BUSPAR  Take 1 tablet by mouth 3 times daily     Calcium 500/D 500-125 MG-UNIT Tabs  Generic drug: Calcium Carbonate-Vitamin D     carvedilol 12.5 MG tablet  Commonly known as: COREG  Take 1 tablet by mouth 2 times daily     citalopram 20 MG tablet  Commonly known as: CELEXA  Take 1 tablet by mouth daily     fenofibrate micronized 134 MG capsule  Commonly known as: LOFIBRA  Take 1 capsule by mouth every morning (before breakfast)     ferrous sulfate 325 (65 Fe) MG tablet  Commonly known as: IRON 325  Take 1 tablet by mouth 2 times daily     furosemide 20 MG tablet  Commonly known as: LASIX  Take 1 tablet by mouth daily     gabapentin 100 MG capsule  Commonly known as: NEURONTIN  Take 2 capsules by mouth 2 times daily for 30 days.      Lancets Misc  1 each by Does not apply route daily     melatonin 3 MG Tabs tablet  Take 2 tablets by mouth nightly as needed (insomnia)     nitroGLYCERIN 0.4 MG SL tablet  Commonly known as: Nitrostat  Place 1 tablet under the tongue every 5 minutes as needed for Chest pain     pramipexole 0.5 MG tablet  Commonly known as: Mirapex  Take 1 tablet by mouth nightly     traZODone 50 MG tablet  Commonly known as: DESYREL  TAKE 1 TABLET BY MOUTH EVERY NIGHT AS NEEDED FOR SLEEP     VITAMIN D (ERGOCALCIFEROL) PO            STOP taking these medications      amLODIPine 5 MG tablet  Commonly known as: Norvasc            ASK your doctor about these medications      blood glucose test strips  Test 2 times a day & as needed for symptoms of irregular blood glucose. cyanocobalamin 1000 MCG tablet  Commonly known as: [facility] VITAMIN B12  Take 1 tablet by mouth daily               Where to Get Your Medications        You can get these medications from any pharmacy    Bring a paper prescription for each of these medications  cefTRIAXone  infusion  Hydrocerin Crea cream  pantoprazole 40 MG tablet         Time Spent on discharge is  35 mins in patient examination, evaluation, counseling as well as medication reconciliation, prescriptions for required medications, discharge plan and follow up. Electronically signed by   Bashir Gutierrez MD  7/21/2022  10:05 AM      Thank you Dr. Elwin Aase, MD for the opportunity to be involved in this patient's care.

## 2022-07-21 NOTE — PLAN OF CARE
Problem: Discharge Planning  Goal: Discharge to home or other facility with appropriate resources  7/21/2022 1203 by Chong Primrose, RN  Outcome: Progressing  Flowsheets (Taken 7/21/2022 1100)  Discharge to home or other facility with appropriate resources:   Identify discharge learning needs (meds, wound care, etc)   Identify barriers to discharge with patient and caregiver  7/21/2022 0444 by Jonathan Huertas RN  Outcome: Progressing     Problem: Pain  Goal: Verbalizes/displays adequate comfort level or baseline comfort level  7/21/2022 1203 by Chong Primrose, RN  Outcome: Progressing  7/21/2022 0444 by Jonathan Huertas RN  Outcome: Progressing     Problem: Safety - Adult  Goal: Free from fall injury  7/21/2022 1203 by Chong Primrose, RN  Outcome: Progressing  Flowsheets (Taken 7/21/2022 0752)  Free From Fall Injury: Instruct family/caregiver on patient safety  7/21/2022 0444 by Jonathan Huertas RN  Outcome: Progressing     Problem: ABCDS Injury Assessment  Goal: Absence of physical injury  7/21/2022 1203 by Chong Primrose, RN  Outcome: Progressing  Flowsheets (Taken 7/21/2022 0752)  Absence of Physical Injury: Implement safety measures based on patient assessment  7/21/2022 0444 by Jonathan Huertas RN  Outcome: Progressing     Problem: Chronic Conditions and Co-morbidities  Goal: Patient's chronic conditions and co-morbidity symptoms are monitored and maintained or improved  7/21/2022 1203 by Chong Primrose, RN  Outcome: Progressing  Flowsheets (Taken 7/21/2022 1100)  Care Plan - Patient's Chronic Conditions and Co-Morbidity Symptoms are Monitored and Maintained or Improved: Monitor and assess patient's chronic conditions and comorbid symptoms for stability, deterioration, or improvement  7/21/2022 0444 by Jonathan Huertas RN  Outcome: Progressing     Problem: Skin/Tissue Integrity  Goal: Absence of new skin breakdown  Description: 1. Monitor for areas of redness and/or skin breakdown  2.   Assess vascular access sites hourly  3. Every 4-6 hours minimum:  Change oxygen saturation probe site  4. Every 4-6 hours:  If on nasal continuous positive airway pressure, respiratory therapy assess nares and determine need for appliance change or resting period.   7/21/2022 1203 by Chong Primrose, RN  Outcome: Progressing  7/21/2022 0444 by Jonathan Huertas RN  Outcome: Progressing

## 2022-07-21 NOTE — PROGRESS NOTES
Physical Therapy  Facility/Department: [facility] MED SURG  Daily Treatment Note  NAME: Durga Rivas  : 1950  MRN: [id number]    Date of Service: 2022    Discharge Recommendations:  Patient would benefit from continued therapy after discharge, Therapy recommended at discharge        Patient Diagnosis(es): The primary encounter diagnosis was Closed fracture of body of sternum, initial encounter. Diagnoses of Closed fracture of one rib of right side, initial encounter and Contusion of face, initial encounter were also pertinent to this visit. Assessment   Activity Tolerance: Patient tolerated treatment well;Patient limited by endurance     Plan    Plan  Plan: 6-7 times per week  Specific Instructions for Next Treatment: BLE strengthening with focus on hip flexors and core; Focused body mechanics training during descent to chair; Progress ambulation distance  Current Treatment Recommendations: Strengthening;ROM;Balance training;Functional mobility training;Transfer training; Endurance training;Gait training;Pain management; Safety education & training; Therapeutic activities     Restrictions  Restrictions/Precautions  Restrictions/Precautions: Fall Risk, General Precautions  Required Braces or Orthoses?: No  Implants present? : Metal implants (Spinal Fusion Hardware)  Position Activity Restriction  Other position/activity restrictions: subacute sternum fracture; avoid excessive pushing/pulling/lifting     Subjective    Subjective  Subjective: Pt reports no pain this morning so far. Pain: 0/10  Orientation  Overall Orientation Status: Within Functional Limits  Orientation Level: Oriented X4     Objective   Vitals     Bed Mobility Training  Bed Mobility Training: Yes  Overall Level of Assistance: Additional time; Adaptive equipment;Assist X1;Contact-guard assistance  Interventions: Safety awareness training;Verbal cues; Tactile cues  Rolling: Stand-by assistance  Supine to Sit: Contact-guard assistance  Sit to Supine:  (NT)  Scooting: Stand-by assistance; Additional time;Assist X1 (to EOB)  Balance  Sitting: Impaired  Sitting - Static: Good (unsupported)  Sitting - Dynamic: Fair (occasional)  Standing: Impaired  Standing - Static: Fair  Standing - Dynamic: Fair  Transfer Training  Transfer Training: Yes  Overall Level of Assistance: Minimum assistance; Adaptive equipment;Assist X1  Interventions: Safety awareness training;Verbal cues; Tactile cues  Sit to Stand: Minimum assistance (from low chair surface)  Stand to Sit: Contact-guard assistance  Stand Pivot Transfers: Minimum assistance  Bed to Chair: Minimum assistance  Gait Training  Gait Training: Yes  Gait  Overall Level of Assistance: Moderate assistance (assisting more with weight shifting and advancing right LE past left LE)  Interventions: Verbal cues; Tactile cues; Safety awareness training  Base of Support: Widened  Speed/Alicia: Slow  Step Length: Left lengthened;Right shortened  Swing Pattern: Left asymmetrical;Right asymmetrical  Stance: Left increased;Right decreased  Gait Abnormalities: Antalgic;Decreased step clearance;Shuffling gait; Step to gait;Trunk sway increased; Toe walking  Distance (ft): 40 Feet (40x2)  Assistive Device: Walker, rolling     PT Exercises  A/AROM Exercises: Seated B LE ex's x15- AROM and orange tband used         [facility] Mobility Inpatient   How much difficulty turning over in bed?: A Little  How much difficulty sitting down on / standing up from a chair with arms?: A Little  How much difficulty moving from lying on back to sitting on side of bed?: A Little  How much help from another person moving to and from a bed to a chair?: A Little  How much help from another person needed to walk in hospital room?: A Little  How much help from another person for climbing 3-5 steps with a railing?: Total  AM-PAC Inpatient Mobility Raw Score : 16  AM-PAC Inpatient T-Scale Score : 40.78  Mobility Inpatient CMS 0-100% Score: 54.16  Mobility Inpatient CMS G-Code Modifier : CK    Goals  Short Term Goals  Short term goal 1: pt to demo all transfers with RW and SBA  Short term goal 2: pt to ambulate 48' with RW and SBA   Short term goal 3: pt to demo improved BLE strength by 1/2 MMG to improve safety and ease with mobility  Short term goal 4: pt to perform all bed mobility with [id number] to decrease burden of care  Patient Goals   Patient goals : pt does not verbalize goals    Education  Patient Education  Education Given To: Patient  Education Provided: Plan of Care;Role of Therapy;Transfer Training  Education Provided Comments: Safe use of RW  Education Method: Verbal  Barriers to Learning: None  Education Outcome: Continued education needed    Therapy Time   Individual Concurrent Group Co-treatment   Time In 0902         Time Out 0930         Minutes [address]

## 2022-07-27 ENCOUNTER — OFFICE VISIT (OUTPATIENT)
Dept: INFECTIOUS DISEASES | Age: 72
End: 2022-07-27
Payer: MEDICARE

## 2022-07-27 VITALS
DIASTOLIC BLOOD PRESSURE: 69 MMHG | BODY MASS INDEX: 34.02 KG/M2 | HEIGHT: 63 IN | SYSTOLIC BLOOD PRESSURE: 139 MMHG | WEIGHT: 192 LBS | HEART RATE: 68 BPM | TEMPERATURE: 97.9 F

## 2022-07-27 DIAGNOSIS — L03.90 CELLULITIS, UNSPECIFIED CELLULITIS SITE: Primary | ICD-10-CM

## 2022-07-27 PROCEDURE — 1123F ACP DISCUSS/DSCN MKR DOCD: CPT | Performed by: INTERNAL MEDICINE

## 2022-07-27 PROCEDURE — 99214 OFFICE O/P EST MOD 30 MIN: CPT | Performed by: INTERNAL MEDICINE

## 2022-07-27 NOTE — PROGRESS NOTES
Infectious disease Consult Note      Patient: Shailesh Singletary  : 1950  Acct#:  [de-identified]     Date:  2022    Subjective:       History of Present Illness  Patient is a 71 y.o.  female    Chief Complaint   Patient presents with    Frequent Infections     Follow up    The patient is here for right leg swelling and redness for around 3 weeks, denied significant pain, denied nausea or vomiting, no diarrhea, no other complaints  The patient is receiving IV ceftriaxone, right arm line in place. She was hospitalized recently at [facility] and was discharged to a nursing facility on IV ceftriaxone  Venous Doppler to lower extremity on 2022 showed no DVT  Past Medical History:   Diagnosis Date    Allergic rhinitis, cause unspecified     Back pain     lumbar    Bowel obstruction (HCC)     history of due to scar tissue, resolved non-surgically    C. difficile diarrhea     CAD (coronary artery disease)     no stent needed per pt.  Dr. Grecia Lott did cath at      Cardiac murmur     Cellulitis     left leg    Cellulitis 2017    leg left leg/bug bite    Cerebral artery occlusion with cerebral infarction [facility])     TIA 2014    COVID-19     ONE YR AGO IN 2020 fever and cough    Diverticulosis of colon (without mention of hemorrhage)     GERD (gastroesophageal reflux disease)     GERD (gastroesophageal reflux disease)     on rx    History of blood transfusion     approx         History of CHF (congestive heart failure)     History of MI (myocardial infarction)     thought due to a blood clot    History of ovarian cyst     had oopherectomy holly    History of peritonitis     due to ruptured appendix age 13    HTN (hypertension)     Hx of blood clots     right leg    Hyperlipidemia     Intestinal or peritoneal adhesions with obstruction (postoperative) (postinfection) ([address]     Kidney infection     renal failure/sepsis/spider bite    Lateral epicondylitis  of elbow     MDRO (multiple drug resistant organisms) resistance     c diff    Muscle strain     right posterior shoulder    Other abnormal glucose     PONV (postoperative nausea and vomiting)     dry heaves    Pre-diabetes     Restless legs syndrome (RLS)     Snores     no cpap    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)     TIA (transient ischemic attack) 2014    Uses walker     Vitamin D deficiency     Wears glasses     Wellness examination     last seen 2 weeks ago      Past Surgical History:   Procedure Laterality Date    ABDOMEN SURGERY  1976    benign tumor removed near remaining ovary, 1.5 pounds    APPENDECTOMY  1968    appendix ruptured, developed peritonitis    BACK SURGERY      BUNIONECTOMY Left     along with calcium deposits removed    [address] [facility] [address]  2005    negative    CERVICAL FUSION  05/21/2021    POSTERIOR C3-6 LAMINECTOMY, PARTIAL C7 LAMINECTOMY, FUSION C3-C6, SILVERCORD    CERVICAL FUSION N/A 5/21/2021    POSTERIOR C3-6 LAMINECTOMY, PARTIAL C7 LAMINECTOMY, FUSION C3-C6, SILVERCORD performed by Taiwo Aiken DO at [place] [address] D/T 5 [facility] Right     right facial    HYSTERECTOMY ([facility])  1973    taken as a result of recurring cysts    LUMBAR FUSION N/A 02/10/2020    LUMBAR L4-5 POSTERIOR  DECOMPRESSION INSTRUMENTATION FUSION WCEMENT AUGMENTATION/ performed by Ramos Danielle MD at [facility] N/A 06/17/2020    L5-S1 PLIF L4-L5 REVISION performed by Ramos Danielle MD at [address] [place] [address]  08/14/2014    [address] [facility]    UNILATERAL due to cyst    OVARY REMOVAL  1971    partial, due to cyst    SINUS SURGERY  2004    UPPER GASTROINTESTINAL ENDOSCOPY N/A 05/31/2019    EGD ESOPHAGOGASTRODUODENOSCOPY performed by Hali Kirby MD at [address] [place] [address] 08/05/2019    EGD BIOPSY performed by Rosie Alanis tongue every 5 minutes as needed for Chest pain 75 tablet 3    cyanocobalamin ([employer] VITAMIN B12) 1000 MCG tablet Take 1 tablet by mouth daily (Patient taking differently: Take 1,000 mcg by mouth at bedtime) 30 tablet 3    ferrous sulfate (IRON 325) 325 (65 Fe) MG tablet Take 1 tablet by mouth 2 times daily 90 tablet 2    VITAMIN D, ERGOCALCIFEROL, PO Take by mouth nightly      Lancets MISC 1 each by Does not apply route daily 100 each 3    blood glucose monitor strips Test 2 times a day & as needed for symptoms of irregular blood glucose. 100 strip 5    Calcium Carbonate-Vitamin D 500-125 MG-UNIT TABS Take 1 tablet by mouth nightly       gabapentin (NEURONTIN) 100 MG capsule Take 2 capsules by mouth 2 times daily for 30 days. 120 capsule 0    [DISCONTINUED] amLODIPine (NORVASC) 5 MG tablet Take 1 tablet by mouth daily 30 tablet 0     No current facility-administered medications on file prior to visit. Allergies  Allergies   Allergen Reactions    Bactrim [Sulfamethoxazole-Trimethoprim] Other (See Comments)     confusion    Codeine Itching    Diazepam Other (See Comments)    Meperidine Hcl Other (See Comments)    Seasonal         Social   Social History     Tobacco Use    Smoking status: Former     Packs/day: 0.50     Years: 20.00     Pack years: 10.00     Types: Cigarettes     Start date: 1995     Quit date: 2017     Years since quittin.1    Smokeless tobacco: Never   Substance Use Topics    Alcohol use: No     Alcohol/week: 0.0 standard drinks           Family History   Problem Relation Age of Onset    Stroke Mother     Diabetes Mother     Heart Disease Mother     High Blood Pressure Mother     Heart Disease Father     Heart Disease Brother     High Blood Pressure Brother     Heart Disease Maternal Grandmother     High Blood Pressure Sister           Review of Systems    Other than above 12 systems reviewed were negative .              Physical Exam  /69 (Site: Left Wrist)   Pulse 68 Temp 97.9 °F (36.6 °C)   Ht 5' 3\" (1.6 m)   Wt 192 lb (87.1 kg)   BMI 34.01 kg/m²           General Appearance: alert and oriented to person, place and time, well-developed and well-nourished, in no acute distress  Skin: Right leg edema, erythema and warmth  Head: normocephalic and atraumatic  Eyes: pupils equal, round, and reactive to light, extraocular eye movements intact, conjunctivae normal  ENT: hearing grossly normal bilaterally. Neck: neck supple and non tender . Pulmonary/Chest: clear to auscultation bilaterally- no wheezes, rales or rhonchi, normal air movement, no respiratory distress  Cardiovascular: normal rate, regular rhythm, normal S1 and S2, no murmurs.   Abdomen: soft, non-tender, non-distended, normal bowel sounds, no masses or organomegaly  Extremities: Right leg edema      Data Review:    WBC   Date Value Ref Range Status   07/21/2022 8.5 3.5 - 11.0 k/uL Final   07/20/2022 8.8 3.5 - 11.0 k/uL Final   07/19/2022 7.9 3.5 - 11.0 k/uL Final     Hemoglobin   Date Value Ref Range Status   07/21/2022 10.3 (L) 12.0 - 16.0 g/dL Final   07/20/2022 10.8 (L) 12.0 - 16.0 g/dL Final   07/19/2022 10.6 (L) 12.0 - 16.0 g/dL Final     Hematocrit   Date Value Ref Range Status   07/21/2022 30.6 (L) 36 - 46 % Final   07/20/2022 32.1 (L) 36 - 46 % Final   07/19/2022 31.3 (L) 36 - 46 % Final     MCV   Date Value Ref Range Status   07/21/2022 93.3 80 - 100 fL Final   07/20/2022 93.3 80 - 100 fL Final   07/19/2022 93.5 80 - 100 fL Final     Platelets   Date Value Ref Range Status   07/21/2022 502 (H) 150 - 450 k/uL Final   07/20/2022 508 (H) 150 - 450 k/uL Final   07/19/2022 490 (H) 150 - 450 k/uL Final     Sodium   Date Value Ref Range Status   07/21/2022 141 135 - 144 mmol/L Final   07/20/2022 140 135 - 144 mmol/L Final   07/19/2022 141 135 - 144 mmol/L Final     Potassium   Date Value Ref Range Status   07/21/2022 4.3 3.7 - 5.3 mmol/L Final   07/20/2022 4.3 3.7 - 5.3 mmol/L Final   07/19/2022 4.1 3.7 - 5.3 mmol/L Final     Chloride   Date Value Ref Range Status   07/21/2022 106 98 - 107 mmol/L Final   07/20/2022 104 98 - 107 mmol/L Final   07/19/2022 105 98 - 107 mmol/L Final     CO2   Date Value Ref Range Status   07/21/2022 25 20 - 31 mmol/L Final   07/20/2022 25 20 - 31 mmol/L Final   07/19/2022 26 20 - 31 mmol/L Final     Phosphorus   Date Value Ref Range Status   08/01/2017 3.7 2.6 - 4.5 mg/dL Final     Comment:     Performed at [facility]: SAROJ HINTON [address] [place] [address]   [phone]     07/31/2017 2.7 2.6 - 4.5 mg/dL Final     Comment:     Performed at [facility]: SAROJ HINTON [address] [place] [address]   [phone]     08/06/2015 3.4 2.6 - 4.5 mg/dL Final     Comment:     Performed at [address]   ([phone]       BUN   Date Value Ref Range Status   07/21/2022 18 8 - 23 mg/dL Final   07/20/2022 14 8 - 23 mg/dL Final   07/19/2022 14 8 - 23 mg/dL Final     Creatinine   Date Value Ref Range Status   07/21/2022 0.67 0.50 - 0.90 mg/dL Final   07/20/2022 0.69 0.50 - 0.90 mg/dL Final   07/19/2022 0.67 0.50 - 0.90 mg/dL Final     AST   Date Value Ref Range Status   07/11/2022 30 <32 U/L Final     Comment:     SPECIMEN SLIGHTLY HEMOLYZED, RESULTS MAY BE ADVERSELY AFFECTED.   06/05/2022 24 <32 U/L Final   03/23/2022 16 <32 U/L Final     ALT   Date Value Ref Range Status   07/11/2022 15 5 - 33 U/L Final   06/05/2022 17 5 - 33 U/L Final   03/23/2022 13 5 - 33 U/L Final     Bilirubin, Direct   Date Value Ref Range Status   07/11/2022 0.17 <0.31 mg/dL Final     Comment:     SPECIMEN SLIGHTLY HEMOLYZED, RESULTS MAY BE ADVERSELY AFFECTED.      Total Bilirubin   Date Value Ref Range Status   07/11/2022 0.60 0.3 - 1.2 mg/dL Final   06/05/2022 0.33 0.3 - 1.2 mg/dL Final   03/23/2022 <0.15 (L) 0.3 - 1.2 mg/dL Final     Alkaline Phosphatase   Date Value Ref Range Status   07/11/2022 36 35 - 104 U/L Final   06/05/2022 47 35 - 104 U/L Final 03/23/2022 48 35 - 104 U/L Final     Lipase   Date Value Ref Range Status   07/11/2022 24 13 - 60 U/L Final   08/21/2019 50 13 - 60 U/L Final   08/17/2019 20 13 - 60 U/L Final     Protime   Date Value Ref Range Status   07/10/2022 17.3 (H) 11.8 - 14.6 sec Final   04/08/2020 13.1 11.8 - 14.6 sec Final   08/22/2019 14.5 11.8 - 14.6 sec Final     INR   Date Value Ref Range Status   07/10/2022 1.4  Final     Comment:           Non-therapeutic Range:     INR = 0.9-1.2  Therapeutic Range: Moderate Anticoagulant Intensity:     INR = 2.0-3.0   High Anticoagulant Intensity:     INR = 2.5-3.5           04/08/2020 1.0  Final     Comment:           Non-therapeutic Range:     INR = 0.9-1.2  Therapeutic Range: Moderate Anticoagulant Intensity:     INR = 2.0-3.0   High Anticoagulant Intensity:     INR = 2.5-3.5           08/22/2019 1.1  Final     Comment:           Non-therapeutic Range:     INR = 0.9-1.2  Therapeutic Range: Moderate Anticoagulant Intensity:     INR = 2.0-3.0   High Anticoagulant Intensity:     INR = 2.5-3.5             No results found for: PTT  No results found for: OCCULTBLD  No results found for: GLUMET     Imaging Studies:                           All appropriate imaging studies and reports reviewed: Yes  XR CHEST (2 VW)    Result Date: 7/11/2022  EXAMINATION: TWO XRAY VIEWS OF THE CHEST 7/11/2022 11:00 am COMPARISON: June 5, 2022 HISTORY: ORDERING SYSTEM PROVIDED HISTORY: sternal Fx TECHNOLOGIST PROVIDED HISTORY: sternal Fx Reason for Exam: recent fall, sob FINDINGS: Left basilar atelectasis or infiltrate. No pneumothorax. Right lung clear. Cardiac size stable. Mediastinum unremarkable. No acute osseous abnormality. Healing midbody sternal fracture better evaluated on previous CT. Left basilar atelectasis or infiltrate. Healing sternal fracture.      CT HEAD WO CONTRAST    Result Date: 7/11/2022  EXAMINATION: CT OF THE HEAD WITHOUT CONTRAST  7/10/2022 2:12 pm TECHNIQUE: CT of the head was performed without the administration of intravenous contrast. Automated exposure control, iterative reconstruction, and/or weight based adjustment of the mA/kV was utilized to reduce the radiation dose to as low as reasonably achievable. COMPARISON: 06/05/2022 HISTORY: ORDERING SYSTEM PROVIDED HISTORY: fall TECHNOLOGIST PROVIDED HISTORY: fall Decision Support Exception - unselect if not a suspected or confirmed emergency medical condition->Emergency Medical Condition (MA) Reason for Exam: fall x 2 days ago FINDINGS: BRAIN/VENTRICLES: There is no acute intracranial hemorrhage, mass effect or midline shift. No abnormal extra-axial fluid collection. The gray-white differentiation is maintained. There is no evidence of hydrocephalus. ORBITS: The visualized portion of the orbits demonstrate no acute abnormality. SINUSES: Partial opacification of the right maxillary sinus. Similar appearance of an ossified lesion in the right maxillary sinus. SOFT TISSUES/SKULL:  No acute abnormality of the visualized skull or soft tissues. No acute intracranial abnormality. CT FACIAL BONES WO CONTRAST    Result Date: 7/10/2022  EXAMINATION: CT OF THE FACE WITHOUT CONTRAST  7/10/2022 2:20 pm TECHNIQUE: CT of the face was performed without the administration of intravenous contrast. Multiplanar reformatted images are provided for review. Automated exposure control, iterative reconstruction, and/or weight based adjustment of the mA/kV was utilized to reduce the radiation dose to as low as reasonably achievable.  COMPARISON: Head CT 12/22/2021 and 05/30/2019 HISTORY: ORDERING SYSTEM PROVIDED HISTORY: Fall TECHNOLOGIST PROVIDED HISTORY: Fall Decision Support Exception - unselect if not a suspected or confirmed emergency medical condition->Emergency Medical Condition (MA) Reason for Exam: fall, left sided facial pain Additional signs and symptoms: left sided contusion FINDINGS: FACIAL BONES: The frontal sinuses, orbital walls, with the lateral masses. Axial images demonstrate no clear evidence for acute fracture within the cervical spine. DEGENERATIVE CHANGES: Mild multilevel disc space narrowing and hypertrophic osteophyte spur formation most notably at C5-C6. Posterior spinal fusion with bilateral rods and interpedicular screws extending from the C3 to C6 vertebral body level. Moderate to severe degenerative changes of the atlantodental and craniocervical junction. SOFT TISSUES: There is no prevertebral soft tissue swelling. Atherosclerotic calcification of the aortic arch. Bilateral carotid vascular calcifications. 1. Kyphosis of the cervical spine centered at C6-C7. 2. Mild multilevel degenerative changes in the cervical spine primarily at C5-C6. 3. No acute vertebral body height loss in the cervical spine. 4. Evidence of posterior spinal fusion from C3-C6. RECOMMENDATIONS: Unavailable     US GALLBLADDER RUQ    Result Date: 7/11/2022  EXAMINATION: RIGHT UPPER QUADRANT ULTRASOUND 7/11/2022 11:32 am COMPARISON: None. HISTORY: ORDERING SYSTEM PROVIDED HISTORY: ruq pain TECHNOLOGIST PROVIDED HISTORY: ruq pain FINDINGS: LIVER:  The liver demonstrates normal echogenicity without evidence of intrahepatic biliary ductal dilatation. There is a paddle pedal flow in the portal vein. Liver 17 cm in length. BILIARY SYSTEM:  Gallstones in the gallbladder. No wall thickening or pericholecystic fluid. Common bile duct is within normal limits measuring 5.2 mm. RIGHT KIDNEY: The right kidney is grossly unremarkable without evidence of hydronephrosis. PANCREAS:  Visualized portions of the pancreas are unremarkable. OTHER: No evidence of right upper quadrant ascites. Cholelithiasis. No acute findings.  RECOMMENDATIONS: Unavailable     CT FEMUR RIGHT W CONTRAST    Result Date: 7/20/2022  EXAMINATION: CT OF THE RIGHT FEMUR WITH CONTRAST 7/20/2022 3:32 pm TECHNIQUE: CT of the right femur was performed with the administration of intravenous contrast.  Multiplanar reformatted images are provided for review. Automated exposure control, iterative reconstruction, and/or weight based adjustment of the mA/kV was utilized to reduce the radiation dose to as low as reasonably achievable. COMPARISON: CT bilateral lower extremity angiogram 10/07/2019. HISTORY ORDERING SYSTEM PROVIDED HISTORY: right thigh and lower leg painful red failed many days of iv abx rule out abscess TECHNOLOGIST PROVIDED HISTORY: right thigh and lower leg painful red failed many days of iv abx rule out abscess Reason for Exam: CELLULITIS, INFECTION RT FEMUR FINDINGS: Bones: No fracture or dislocation. No osseous erosion or periosteal reaction. No suspicious lytic or blastic osseous lesion. Soft Tissue: Subcutaneous fat stranding and skin thickening laterally at the level of the hip. Circumferential subcutaneous fat stranding and skin thickening in the mid and distal thigh and visualized proximal aspects of the lower leg. No inguinal lymphadenopathy. The visualized soft tissue contents of the pelvis are unremarkable. No drainable fluid collection or soft tissue gas. The visualized musculature is unremarkable. Joint: Moderate pubic symphysis degenerative changes. Mild right hip degenerative changes. No knee effusion or popliteal cyst.  No significant degenerative changes of the knee. 1. Subcutaneous fat stranding of the thigh and proximal lower leg as above compatible with lymph edema or cellulitis. No abscess or soft tissue gas. 2. No acute osseous abnormality.      VL Lower Extremity Bilateral Venous Duplex    Result Date: 7/13/2022    [facility]  Vascular Lower Extremities DVT Study Procedure   Patient Name      Jorge Ambrocio Date of Study             07/12/2022                    K   Date of Birth     1950   Gender                    Female   Age               71 year(s)   Race                         Room Number       2117   Corporate ID # [id number]   Patient Acct #    [de-identified]   MR #              [id number]       Lincoln Conway   Accession #       [id number]   Interpreting Physician    Micah Whitt   Referring Nurse                Referring Physician       [facility]  Practitioner  Procedure Type of Study:   Veins: Lower Extremities DVT Study, Venous Scan Lower Bilateral.  Indications for Study:Leg Swelling. Patient Status: In Patient. Technical Quality:Limited visualization. Limitation reason:Edema. Conclusions   Summary   Bilateral:  Technically limited study as stated above however in the visualized areas  no superficial or deep vein thrombosis was identified. Signature   ----------------------------------------------------------------  Electronically signed by James Grant(Sonographer) on  07/12/2022 07:52 AM  ----------------------------------------------------------------   ----------------------------------------------------------------  Electronically signed by Micah Whitt(Interpreting physician)  on 07/13/2022 07:32 AM  ----------------------------------------------------------------  Findings:   Right Impression:                    Left Impression:  Non visualization of the peroneal    Non visualization of the peroneal  veins. veins. Remaining deep veins                 Remaining deep veins  demonstrate normal compressibility. demonstrate normal compressibility. Normal compressibility of the great  Normal compressibility of the great  saphenous vein. saphenous vein. Normal compressibility of the small  Normal compressibility of the small  saphenous vein. saphenous vein. Velocities are measured in cm/s ; Diameters are measured in cm Right Lower Extremities DVT Study Measurements Right 2D Measurements +------------------------------------+----------+---------------+----------+ ! Location !Visualized! Compressibility! Thrombosis! +------------------------------------+----------+---------------+----------+ ! Common Femoral                      !Yes       ! Yes            ! None      ! +------------------------------------+----------+---------------+----------+ ! Prox Femoral                        !Yes       ! Yes            ! None      ! +------------------------------------+----------+---------------+----------+ ! Mid Femoral                         !Yes       ! Yes            ! None      ! +------------------------------------+----------+---------------+----------+ ! Dist Femoral                        !Yes       ! Yes            ! None      ! +------------------------------------+----------+---------------+----------+ ! Popliteal                           !Yes       ! Yes            ! None      ! +------------------------------------+----------+---------------+----------+ ! Sapheno Femoral Junction            ! Yes       ! Yes            ! None      ! +------------------------------------+----------+---------------+----------+ ! PTV                                 ! Partial   !Yes            ! None      ! +------------------------------------+----------+---------------+----------+ ! Peroneal                            !No        !               !          ! +------------------------------------+----------+---------------+----------+ ! Gastroc                             ! Partial   !Yes            ! None      ! +------------------------------------+----------+---------------+----------+ ! GSV Thigh                           ! Yes       ! Yes            ! None      ! +------------------------------------+----------+---------------+----------+ ! GSV Knee                            ! Yes       ! Yes            ! None      ! +------------------------------------+----------+---------------+----------+ ! GSV Ankle                           ! Yes       ! Yes            ! None      ! +------------------------------------+----------+---------------+----------+ ! SSV                                 ! Partial   !Yes            ! None      ! +------------------------------------+----------+---------------+----------+ Right Doppler Measurements +---------------------------+------+------+--------------------------------+ ! Location                   ! Signal!Reflux! Reflux (msec)                   ! +---------------------------+------+------+--------------------------------+ ! Common Femoral             !Phasic!      !                                ! +---------------------------+------+------+--------------------------------+ ! Prox Femoral               !Phasic!      !                                ! +---------------------------+------+------+--------------------------------+ ! Popliteal                  !Phasic!      !                                ! +---------------------------+------+------+--------------------------------+ Left Lower Extremities DVT Study Measurements Left 2D Measurements +------------------------------------+----------+---------------+----------+ ! Location                            ! Visualized! Compressibility! Thrombosis! +------------------------------------+----------+---------------+----------+ ! Common Femoral                      !Yes       ! Yes            ! None      ! +------------------------------------+----------+---------------+----------+ ! Prox Femoral                        !Yes       ! Yes            ! None      ! +------------------------------------+----------+---------------+----------+ ! Mid Femoral                         !Partial   !Yes            ! None      ! +------------------------------------+----------+---------------+----------+ ! Dist Femoral                        !Yes       ! Yes            ! None      ! +------------------------------------+----------+---------------+----------+ ! Popliteal                           !Yes       ! Yes            ! None      ! +------------------------------------+----------+---------------+----------+ ! Sapheno Femoral Junction            ! Yes       ! Yes            ! None      ! +------------------------------------+----------+---------------+----------+ ! PTV                                 ! Partial   !Yes            ! None      ! +------------------------------------+----------+---------------+----------+ ! Peroneal                            !No        !               !          ! +------------------------------------+----------+---------------+----------+ ! Gastroc                             ! Partial   !Yes            ! None      ! +------------------------------------+----------+---------------+----------+ ! GSV Thigh                           ! Yes       ! Yes            ! None      ! +------------------------------------+----------+---------------+----------+ ! GSV Knee                            ! Yes       ! Yes            ! None      ! +------------------------------------+----------+---------------+----------+ ! GSV Ankle                           ! Yes       ! Yes            ! None      ! +------------------------------------+----------+---------------+----------+ ! SSV                                 ! Partial   !Yes            ! None      ! +------------------------------------+----------+---------------+----------+ Left Doppler Measurements +---------------------------+------+------+--------------------------------+ ! Location                   ! Signal!Reflux! Reflux (msec)                   ! +---------------------------+------+------+--------------------------------+ ! Common Femoral             !Phasic!      !                                ! +---------------------------+------+------+--------------------------------+ ! Prox Femoral               !Phasic!      !                                ! +---------------------------+------+------+--------------------------------+ ! Popliteal                  !Phasic!      ! ! +---------------------------+------+------+--------------------------------+    NM HEPATOBILIARY SCAN W PHARMACOLOGICAL INTERVENTION    Result Date: 7/11/2022  EXAMINATION: NUCLEAR MEDICINE HEPATOBILIARY SCINTIGRAPHY (HIDA SCAN). 7/11/2022 11:31 am TECHNIQUE: Approximately 4.9 mCi Tc-99m Mebrofenin (Choletec) was administered IV. Then, dynamic images of the abdomen were obtained in the anterior projection for 60 min(s). A right lateral view was also obtained at 60 min(s). The patient was in pain at the end of the 1st hour due to recent fractures. The patient was returned to her room and pain medications were given. The patient than return for a 2 hour image. COMPARISON: CT scan 07/10/2022 HISTORY: ORDERING SYSTEM PROVIDED HISTORY: acute cholecystitis TECHNOLOGIST PROVIDED HISTORY: acute cholecystitis Reason for Exam: acute cholecystitis FINDINGS: Prompt, homogenous uptake by the liver is noted with normal appearance of radiotracer excretion into the biliary system. Clearance of blood pool activity appears appropriate. Normal bowel activity was seen. There is probable accumulation of radiotracer within the gallbladder during the 1st hour, confirmed on the delayed images. The common bile duct and cystic duct are patent. No acute cholecystitis. CT CHEST ABDOMEN PELVIS W CONTRAST    Result Date: 7/11/2022  EXAMINATION: CT OF THE CHEST, ABDOMEN, AND PELVIS WITH CONTRAST 7/10/2022 2:00 pm TECHNIQUE: CT of the chest, abdomen and pelvis was performed with the administration of intravenous contrast. Multiplanar reformatted images are provided for review. Automated exposure control, iterative reconstruction, and/or weight based adjustment of the mA/kV was utilized to reduce the radiation dose to as low as reasonably achievable. COMPARISON: CT chest 04/09/2022.  HISTORY: ORDERING SYSTEM PROVIDED HISTORY: Fall TECHNOLOGIST PROVIDED HISTORY: Fall Decision Support Exception - unselect if not a suspected or confirmed LEFT    Result Date: 7/11/2022  EXAMINATION: ONE X-RAY VIEW OF THE PELVIS AND TWO X-RAY VIEWS LEFT HIP 7/10/2022 2:58 pm COMPARISON: 05/22/2021 HISTORY: ORDERING SYSTEM PROVIDED HISTORY:  Fall TECHNOLOGIST PROVIDED HISTORY: Fall Reason for Exam:  Fall, left hip pain FINDINGS: Generalized osteopenia limits evaluation for nondisplaced fractures. Within this limitation, no acute fracture is identified. No evidence of dislocation. Mild to moderate bilateral hip joint degenerative changes. Again seen are postsurgical changes from lower spinal fusion. There is contrast in the urinary bladder. No acute bony abnormality identified. Assessment:   Right leg cellulitis/lymphedema  Cholelithiasis  Diabetes mellitus  Chronic kidney disease  Sulfa allergy    Recommendations:   IV ceftriaxone 1 g daily through 8/5/2022  P.o. doxycycline 100 mg twice daily for 1 week  Leg elevation and compression  Follow-up in 1 week      Thank you for allowing me to participate in the care of your patient. Please feel free to contact me with any questions or concerns.      Jennifer Luna MD

## 2022-07-30 RX ORDER — TRAZODONE HYDROCHLORIDE 50 MG/1
TABLET ORAL
Qty: 30 TABLET | Refills: 0 | Status: SHIPPED | OUTPATIENT
Start: 2022-07-30 | End: 2022-09-06

## 2022-08-03 ENCOUNTER — OFFICE VISIT (OUTPATIENT)
Dept: INFECTIOUS DISEASES | Age: 72
End: 2022-08-03
Payer: MEDICARE

## 2022-08-03 VITALS
HEART RATE: 65 BPM | WEIGHT: 192 LBS | SYSTOLIC BLOOD PRESSURE: 132 MMHG | OXYGEN SATURATION: 98 % | BODY MASS INDEX: 34.02 KG/M2 | DIASTOLIC BLOOD PRESSURE: 78 MMHG | HEIGHT: 63 IN | TEMPERATURE: 97.8 F | RESPIRATION RATE: 16 BRPM

## 2022-08-03 DIAGNOSIS — L03.90 CELLULITIS, UNSPECIFIED CELLULITIS SITE: Primary | ICD-10-CM

## 2022-08-03 PROCEDURE — 99214 OFFICE O/P EST MOD 30 MIN: CPT | Performed by: INTERNAL MEDICINE

## 2022-08-03 PROCEDURE — 1123F ACP DISCUSS/DSCN MKR DOCD: CPT | Performed by: INTERNAL MEDICINE

## 2022-08-03 NOTE — PROGRESS NOTES
Infectious disease Consult Note      Patient: Charity Check  : 1950  Acct#:  [de-identified]     Date:  8/3/2022    Subjective:       History of Present Illness  Patient is a 71 y.o.  female    Chief Complaint   Patient presents with    Follow-up     CELLUITIS   The patient is here for right leg swelling and redness for around 4weeks. The patient is receiving IV ceftriaxone. She was hospitalized recently at [facility] and was discharged to a nursing facility on IV ceftriaxone  Venous Doppler to lower extremity on 2022 showed no DVT. She is feeling better today, less right leg swelling and redness, denied significant pain, denied fever or chills, no other complaints. Past Medical History:   Diagnosis Date    Allergic rhinitis, cause unspecified     Back pain     lumbar    Bowel obstruction (HCC)     history of due to scar tissue, resolved non-surgically    C. difficile diarrhea     CAD (coronary artery disease)     no stent needed per pt.  Dr. Yaakov Smith did cath at      Cardiac murmur     Cellulitis     left leg    Cellulitis 2017    leg left leg/bug bite    Cerebral artery occlusion with cerebral infarction [facility])     TIA 2014    COVID-19     ONE YR AGO IN 2020 fever and cough    Diverticulosis of colon (without mention of hemorrhage)     GERD (gastroesophageal reflux disease)     GERD (gastroesophageal reflux disease)     on rx    History of blood transfusion     approx         History of CHF (congestive heart failure)     History of MI (myocardial infarction)     thought due to a blood clot    History of ovarian cyst     had oopherectomy holly    History of peritonitis     due to ruptured appendix age 13    HTN (hypertension)     Hx of blood clots     right leg    Hyperlipidemia     Intestinal or peritoneal adhesions with obstruction (postoperative) (postinfection) ([facility] [address]     Kidney infection     renal failure/sepsis/spider bite    Lateral epicondylitis  of elbow     MDRO (multiple drug resistant organisms) resistance     c diff    Muscle strain     right posterior shoulder    Other abnormal glucose     PONV (postoperative nausea and vomiting)     dry heaves    Pre-diabetes     Restless legs syndrome (RLS)     Snores     no cpap    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)     TIA (transient ischemic attack) 2014    Uses walker     Vitamin D deficiency     Wears glasses     Wellness examination     last seen 2 weeks ago      Past Surgical History:   Procedure Laterality Date    ABDOMEN SURGERY  1976    benign tumor removed near remaining ovary, 1.5 pounds    APPENDECTOMY  1968    appendix ruptured, developed peritonitis    BACK SURGERY      BUNIONECTOMY Left     along with calcium deposits removed    [address]  2005    negative    CERVICAL FUSION  05/21/2021    POSTERIOR C3-6 LAMINECTOMY, PARTIAL C7 LAMINECTOMY, FUSION C3-C6, SILVERCORD    CERVICAL FUSION N/A 5/21/2021    POSTERIOR C3-6 LAMINECTOMY, PARTIAL C7 LAMINECTOMY, FUSION C3-C6, SILVERCORD performed by Louisa Cifuentes DO at [facility] [address] D/T 5 [facility] Right     right facial    HYSTERECTOMY ([address] [facility])  1973    taken as a result of recurring cysts    LUMBAR FUSION N/A 02/10/2020    LUMBAR L4-5 POSTERIOR  DECOMPRESSION INSTRUMENTATION FUSION WCEMENT AUGMENTATION/ performed by Silverio Gasca MD at [facility] N/A 06/17/2020    L5-S1 PLIF L4-L5 REVISION performed by Silverio Gasca MD at . [facility] [address]  08/14/2014    [address] [facility]    UNILATERAL due to cyst    OVARY REMOVAL  1971    partial, due to cyst    SINUS SURGERY  2004    UPPER GASTROINTESTINAL ENDOSCOPY N/A 05/31/2019    EGD ESOPHAGOGASTRODUODENOSCOPY performed by Destiney Macdonald MD at [address] [place] [address] 08/05/2019    EGD BIOPSY performed by Laura Goncalves MD at [address] 08/23/2019    EGD BIOPSY performed by Jojo Kirby MD at [address] Left 03/05/2019    WRIST OPEN REDUCTION INTERNAL FIXATION performed by Marisol Alarcon MD at [address]          Admission Meds  Current Outpatient Medications on File Prior to Visit   Medication Sig Dispense Refill    traZODone (DESYREL) 50 MG tablet TAKE 1 TABLET BY MOUTH EVERY NIGHT AS NEEDED FOR SLEEP 30 tablet 0    Skin Protectants, Misc.  (HYDROCERIN) CREA cream Apply topically 2 times daily 1 each 0    pantoprazole (PROTONIX) 40 MG tablet Take 1 tablet by mouth every morning (before breakfast) 30 tablet 3    busPIRone (BUSPAR) 5 MG tablet Take 1 tablet by mouth 3 times daily 90 tablet 0    pramipexole (MIRAPEX) 0.5 MG tablet Take 1 tablet by mouth nightly 30 tablet 0    furosemide (LASIX) 20 MG tablet Take 1 tablet by mouth daily 30 tablet 0    citalopram (CELEXA) 20 MG tablet Take 1 tablet by mouth daily 30 tablet 0    fenofibrate micronized (LOFIBRA) 134 MG capsule Take 1 capsule by mouth every morning (before breakfast) 90 capsule 2    apixaban (ELIQUIS) 5 MG TABS tablet Take 1 tablet by mouth 2 times daily 60 tablet 0    albuterol-ipratropium (COMBIVENT RESPIMAT)  MCG/ACT AERS inhaler Inhale 1 puff into the lungs every 6 hours as needed for Wheezing or Shortness of Breath 4 g 0    atorvastatin (LIPITOR) 40 MG tablet Take 1 tablet by mouth nightly 30 tablet 0    carvedilol (COREG) 12.5 MG tablet Take 1 tablet by mouth 2 times daily 60 tablet 0    acetaminophen (TYLENOL) 325 MG tablet Take 2 tablets by mouth every 4 hours as needed for Pain      melatonin 3 MG TABS tablet Take 2 tablets by mouth nightly as needed (insomnia)      nitroGLYCERIN (NITROSTAT) 0.4 MG SL tablet Place 1 tablet under the tongue every 5 minutes as needed for Chest pain 75 tablet 3    cyanocobalamin ([employer] VITAMIN B12) 1000 MCG tablet Take 1 tablet by mouth daily (Patient taking differently: Take 1,000 mcg by mouth at bedtime) 30 tablet 3    ferrous sulfate (IRON 325) 325 (65 Fe) MG tablet Take 1 tablet by mouth 2 times daily 90 tablet 2    VITAMIN D, ERGOCALCIFEROL, PO Take by mouth nightly      Lancets MISC 1 each by Does not apply route daily 100 each 3    blood glucose monitor strips Test 2 times a day & as needed for symptoms of irregular blood glucose. 100 strip 5    Calcium Carbonate-Vitamin D 500-125 MG-UNIT TABS Take 1 tablet by mouth nightly       gabapentin (NEURONTIN) 100 MG capsule Take 2 capsules by mouth 2 times daily for 30 days. 120 capsule 0    [DISCONTINUED] amLODIPine (NORVASC) 5 MG tablet Take 1 tablet by mouth daily 30 tablet 0     No current facility-administered medications on file prior to visit. Allergies  Allergies   Allergen Reactions    Bactrim [Sulfamethoxazole-Trimethoprim] Other (See Comments)     confusion    Codeine Itching    Diazepam Other (See Comments)    Meperidine Hcl Other (See Comments)    Seasonal         Social   Social History     Tobacco Use    Smoking status: Former     Packs/day: 0.50     Years: 20.00     Pack years: 10.00     Types: Cigarettes     Start date: 1995     Quit date: 2017     Years since quittin.1    Smokeless tobacco: Never   Substance Use Topics    Alcohol use: No     Alcohol/week: 0.0 standard drinks           Family History   Problem Relation Age of Onset    Stroke Mother     Diabetes Mother     Heart Disease Mother     High Blood Pressure Mother     Heart Disease Father     Heart Disease Brother     High Blood Pressure Brother     Heart Disease Maternal Grandmother     High Blood Pressure Sister           Review of Systems    Other than above 12 systems reviewed were negative .              Physical Exam  /78   Pulse 65   Temp 97.8 °F (36.6 °C)   Resp 16   Ht 5' 3\" (1.6 m)   Wt 192 lb (87.1 kg)   SpO2 98%   BMI 34.01 kg/m²           General 104 98 - 107 mmol/L Final   07/19/2022 105 98 - 107 mmol/L Final     CO2   Date Value Ref Range Status   07/21/2022 25 20 - 31 mmol/L Final   07/20/2022 25 20 - 31 mmol/L Final   07/19/2022 26 20 - 31 mmol/L Final     Phosphorus   Date Value Ref Range Status   08/01/2017 3.7 2.6 - 4.5 mg/dL Final     Comment:     Performed at [facility]: [address] [facility]   [phone]     07/31/2017 2.7 2.6 - 4.5 mg/dL Final     Comment:     Performed at [facility]: [address] [facility]   [phone]     08/06/2015 3.4 2.6 - 4.5 mg/dL Final     Comment:     Performed at [address] [place] [address] [address] [facility]   [phone]       BUN   Date Value Ref Range Status   07/21/2022 18 8 - 23 mg/dL Final   07/20/2022 14 8 - 23 mg/dL Final   07/19/2022 14 8 - 23 mg/dL Final     Creatinine   Date Value Ref Range Status   07/21/2022 0.67 0.50 - 0.90 mg/dL Final   07/20/2022 0.69 0.50 - 0.90 mg/dL Final   07/19/2022 0.67 0.50 - 0.90 mg/dL Final     AST   Date Value Ref Range Status   07/11/2022 30 <32 U/L Final     Comment:     SPECIMEN SLIGHTLY HEMOLYZED, RESULTS MAY BE ADVERSELY AFFECTED.   06/05/2022 24 <32 U/L Final   03/23/2022 16 <32 U/L Final     ALT   Date Value Ref Range Status   07/11/2022 15 5 - 33 U/L Final   06/05/2022 17 5 - 33 U/L Final   03/23/2022 13 5 - 33 U/L Final     Bilirubin, Direct   Date Value Ref Range Status   07/11/2022 0.17 <0.31 mg/dL Final     Comment:     SPECIMEN SLIGHTLY HEMOLYZED, RESULTS MAY BE ADVERSELY AFFECTED.      Total Bilirubin   Date Value Ref Range Status   07/11/2022 0.60 0.3 - 1.2 mg/dL Final   06/05/2022 0.33 0.3 - 1.2 mg/dL Final   03/23/2022 <0.15 (L) 0.3 - 1.2 mg/dL Final     Alkaline Phosphatase   Date Value Ref Range Status   07/11/2022 36 35 - 104 U/L Final   06/05/2022 47 35 - 104 U/L Final   03/23/2022 48 35 - 104 U/L Final     Lipase   Date Value Ref Range Status   07/11/2022 24 13 - 60 U/L Final   08/21/2019 50 13 - 60 U/L Final   08/17/2019 20 13 - 60 U/L Final     Protime   Date Value Ref Range Status   07/10/2022 17.3 (H) 11.8 - 14.6 sec Final   04/08/2020 13.1 11.8 - 14.6 sec Final   08/22/2019 14.5 11.8 - 14.6 sec Final     INR   Date Value Ref Range Status   07/10/2022 1.4  Final     Comment:           Non-therapeutic Range:     INR = 0.9-1.2  Therapeutic Range: Moderate Anticoagulant Intensity:     INR = 2.0-3.0   High Anticoagulant Intensity:     INR = 2.5-3.5           04/08/2020 1.0  Final     Comment:           Non-therapeutic Range:     INR = 0.9-1.2  Therapeutic Range: Moderate Anticoagulant Intensity:     INR = 2.0-3.0   High Anticoagulant Intensity:     INR = 2.5-3.5           08/22/2019 1.1  Final     Comment:           Non-therapeutic Range:     INR = 0.9-1.2  Therapeutic Range: Moderate Anticoagulant Intensity:     INR = 2.0-3.0   High Anticoagulant Intensity:     INR = 2.5-3.5             No results found for: PTT  No results found for: OCCULTBLD  No results found for: GLUMET     Imaging Studies:                           All appropriate imaging studies and reports reviewed: Yes  XR CHEST (2 VW)    Result Date: 7/11/2022  EXAMINATION: TWO XRAY VIEWS OF THE CHEST 7/11/2022 11:00 am COMPARISON: June 5, 2022 HISTORY: ORDERING SYSTEM PROVIDED HISTORY: sternal Fx TECHNOLOGIST PROVIDED HISTORY: sternal Fx Reason for Exam: recent fall, sob FINDINGS: Left basilar atelectasis or infiltrate. No pneumothorax. Right lung clear. Cardiac size stable. Mediastinum unremarkable. No acute osseous abnormality. Healing midbody sternal fracture better evaluated on previous CT. Left basilar atelectasis or infiltrate. Healing sternal fracture.      CT HEAD WO CONTRAST    Result Date: 7/11/2022  EXAMINATION: CT OF THE HEAD WITHOUT CONTRAST  7/10/2022 2:12 pm TECHNIQUE: CT of the head was performed without the administration of intravenous contrast. Automated exposure control, iterative reconstruction, and/or weight based adjustment of the mA/kV was utilized to reduce the radiation dose to as low as reasonably achievable. COMPARISON: 06/05/2022 HISTORY: ORDERING SYSTEM PROVIDED HISTORY: fall TECHNOLOGIST PROVIDED HISTORY: fall Decision Support Exception - unselect if not a suspected or confirmed emergency medical condition->Emergency Medical Condition (MA) Reason for Exam: fall x 2 days ago FINDINGS: BRAIN/VENTRICLES: There is no acute intracranial hemorrhage, mass effect or midline shift. No abnormal extra-axial fluid collection. The gray-white differentiation is maintained. There is no evidence of hydrocephalus. ORBITS: The visualized portion of the orbits demonstrate no acute abnormality. SINUSES: Partial opacification of the right maxillary sinus. Similar appearance of an ossified lesion in the right maxillary sinus. SOFT TISSUES/SKULL:  No acute abnormality of the visualized skull or soft tissues. No acute intracranial abnormality. CT FACIAL BONES WO CONTRAST    Result Date: 7/10/2022  EXAMINATION: CT OF THE FACE WITHOUT CONTRAST  7/10/2022 2:20 pm TECHNIQUE: CT of the face was performed without the administration of intravenous contrast. Multiplanar reformatted images are provided for review. Automated exposure control, iterative reconstruction, and/or weight based adjustment of the mA/kV was utilized to reduce the radiation dose to as low as reasonably achievable. COMPARISON: Head CT 12/22/2021 and 05/30/2019 HISTORY: ORDERING SYSTEM PROVIDED HISTORY: Fall TECHNOLOGIST PROVIDED HISTORY: Fall Decision Support Exception - unselect if not a suspected or confirmed emergency medical condition->Emergency Medical Condition (MA) Reason for Exam: fall, left sided facial pain Additional signs and symptoms: left sided contusion FINDINGS: FACIAL BONES: The frontal sinuses, orbital walls, maxilla, pterygoid plates, zygomatic arches, hard palate, nasal bones and mandible are intact.   The temporomandibular joints are aligned. ORBITAL CONTENTS: The globes appear intact. The extraocular muscles, optic nerve sheath complexes and lacrimal glands appear unremarkable. No retrobulbar hematoma or mass is seen. SINUSES: Mild mucosal thickening right left maxillary sinuses. Prominent ossified density extending from the floor of the right maxillary sinus typical of exostosis. There is no evidence of acute sinusitis, such as air fluid level. The mastoid air cells are clear. SOFT TISSUES: No superficial facial soft tissue swelling is seen. No acute facial bone trauma. Mild nonspecific inflammatory changes bilateral maxillary sinuses. CT CERVICAL SPINE WO CONTRAST    Result Date: 7/10/2022  EXAMINATION: CT OF THE CERVICAL SPINE WITHOUT CONTRAST 7/10/2022 2:22 pm TECHNIQUE: CT of the cervical spine was performed without the administration of intravenous contrast. Multiplanar reformatted images are provided for review. Automated exposure control, iterative reconstruction, and/or weight based adjustment of the mA/kV was utilized to reduce the radiation dose to as low as reasonably achievable. COMPARISON: 06/05/2022 HISTORY: ORDERING SYSTEM PROVIDED HISTORY: Fall TECHNOLOGIST PROVIDED HISTORY: Fall Decision Support Exception - unselect if not a suspected or confirmed emergency medical condition->Emergency Medical Condition (MA) Reason for Exam: fall Additional signs and symptoms: neck pain Relevant Medical/Surgical History: hx of neck surgery 44-year-old female with history of fall and history of neck surgery. FINDINGS: BONES/ALIGNMENT: Cervical spine is imaged from the skull base to the lower T3 vertebral body level on the sagittal reconstructions. Kyphosis of the cervical spine centered at C6-C7. Odontoid appears intact. Lateral masses symmetric in appearance. Occipital condyles articulate properly with the lateral masses.   Axial images demonstrate no clear evidence for acute fracture within the cervical spine. DEGENERATIVE CHANGES: Mild multilevel disc space narrowing and hypertrophic osteophyte spur formation most notably at C5-C6. Posterior spinal fusion with bilateral rods and interpedicular screws extending from the C3 to C6 vertebral body level. Moderate to severe degenerative changes of the atlantodental and craniocervical junction. SOFT TISSUES: There is no prevertebral soft tissue swelling. Atherosclerotic calcification of the aortic arch. Bilateral carotid vascular calcifications. 1. Kyphosis of the cervical spine centered at C6-C7. 2. Mild multilevel degenerative changes in the cervical spine primarily at C5-C6. 3. No acute vertebral body height loss in the cervical spine. 4. Evidence of posterior spinal fusion from C3-C6. RECOMMENDATIONS: Unavailable     US GALLBLADDER RUQ    Result Date: 7/11/2022  EXAMINATION: RIGHT UPPER QUADRANT ULTRASOUND 7/11/2022 11:32 am COMPARISON: None. HISTORY: ORDERING SYSTEM PROVIDED HISTORY: ruq pain TECHNOLOGIST PROVIDED HISTORY: ruq pain FINDINGS: LIVER:  The liver demonstrates normal echogenicity without evidence of intrahepatic biliary ductal dilatation. There is a paddle pedal flow in the portal vein. Liver 17 cm in length. BILIARY SYSTEM:  Gallstones in the gallbladder. No wall thickening or pericholecystic fluid. Common bile duct is within normal limits measuring 5.2 mm. RIGHT KIDNEY: The right kidney is grossly unremarkable without evidence of hydronephrosis. PANCREAS:  Visualized portions of the pancreas are unremarkable. OTHER: No evidence of right upper quadrant ascites. Cholelithiasis. No acute findings. RECOMMENDATIONS: Unavailable     CT FEMUR RIGHT W CONTRAST    Result Date: 7/20/2022  EXAMINATION: CT OF THE RIGHT FEMUR WITH CONTRAST 7/20/2022 3:32 pm TECHNIQUE: CT of the right femur was performed with the administration of intravenous contrast.  Multiplanar reformatted images are provided for review.  Automated exposure control, Jesus Colunga   Accession #       [id number]   Interpreting Physician    Micah Whitt   Referring Nurse                Referring Physician       Camila Man  Practitioner  Procedure Type of Study:   Veins: Lower Extremities DVT Study, Venous Scan Lower Bilateral.  Indications for Study:Leg Swelling. Patient Status: In Patient. Technical Quality:Limited visualization. Limitation reason:Edema. Conclusions   Summary   Bilateral:  Technically limited study as stated above however in the visualized areas  no superficial or deep vein thrombosis was identified. Signature   ----------------------------------------------------------------  Electronically signed by James Askew(Sonographer) on  07/12/2022 07:52 AM  ----------------------------------------------------------------   ----------------------------------------------------------------  Electronically signed by Micah Whitt(Interpreting physician)  on 07/13/2022 07:32 AM  ----------------------------------------------------------------  Findings:   Right Impression:                    Left Impression:  Non visualization of the peroneal    Non visualization of the peroneal  veins. veins. Remaining deep veins                 Remaining deep veins  demonstrate normal compressibility. demonstrate normal compressibility. Normal compressibility of the great  Normal compressibility of the great  saphenous vein. saphenous vein. Normal compressibility of the small  Normal compressibility of the small  saphenous vein. saphenous vein. Velocities are measured in cm/s ; Diameters are measured in cm Right Lower Extremities DVT Study Measurements Right 2D Measurements +------------------------------------+----------+---------------+----------+ ! Location                            ! Visualized! Compressibility! Thrombosis!  +------------------------------------+----------+---------------+----------+ !Common Femoral                      !Yes       ! Yes            ! None      ! +------------------------------------+----------+---------------+----------+ ! Prox Femoral                        !Yes       ! Yes            ! None      ! +------------------------------------+----------+---------------+----------+ ! Mid Femoral                         !Yes       ! Yes            ! None      ! +------------------------------------+----------+---------------+----------+ ! Dist Femoral                        !Yes       ! Yes            ! None      ! +------------------------------------+----------+---------------+----------+ ! Popliteal                           !Yes       ! Yes            ! None      ! +------------------------------------+----------+---------------+----------+ ! Sapheno Femoral Junction            ! Yes       ! Yes            ! None      ! +------------------------------------+----------+---------------+----------+ ! PTV                                 ! Partial   !Yes            ! None      ! +------------------------------------+----------+---------------+----------+ ! Peroneal                            !No        !               !          ! +------------------------------------+----------+---------------+----------+ ! Gastroc                             ! Partial   !Yes            ! None      ! +------------------------------------+----------+---------------+----------+ ! GSV Thigh                           ! Yes       ! Yes            ! None      ! +------------------------------------+----------+---------------+----------+ ! GSV Knee                            ! Yes       ! Yes            ! None      ! +------------------------------------+----------+---------------+----------+ ! GSV Ankle                           ! Yes       ! Yes            ! None      ! +------------------------------------+----------+---------------+----------+ ! SSV                                 ! Partial   !Yes            ! None      ! +------------------------------------+----------+---------------+----------+ Right Doppler Measurements +---------------------------+------+------+--------------------------------+ ! Location                   ! Signal!Reflux! Reflux (msec)                   ! +---------------------------+------+------+--------------------------------+ ! Common Femoral             !Phasic!      !                                ! +---------------------------+------+------+--------------------------------+ ! Prox Femoral               !Phasic!      !                                ! +---------------------------+------+------+--------------------------------+ ! Popliteal                  !Phasic!      !                                ! +---------------------------+------+------+--------------------------------+ Left Lower Extremities DVT Study Measurements Left 2D Measurements +------------------------------------+----------+---------------+----------+ ! Location                            ! Visualized! Compressibility! Thrombosis! +------------------------------------+----------+---------------+----------+ ! Common Femoral                      !Yes       ! Yes            ! None      ! +------------------------------------+----------+---------------+----------+ ! Prox Femoral                        !Yes       ! Yes            ! None      ! +------------------------------------+----------+---------------+----------+ ! Mid Femoral                         !Partial   !Yes            ! None      ! +------------------------------------+----------+---------------+----------+ ! Dist Femoral                        !Yes       ! Yes            ! None      ! +------------------------------------+----------+---------------+----------+ ! Popliteal                           !Yes       ! Yes            ! None      ! +------------------------------------+----------+---------------+----------+ ! Sapheno Femoral Junction            ! Yes       ! Yes            ! None      ! +------------------------------------+----------+---------------+----------+ ! PTV                                 ! Partial   !Yes            ! None      ! +------------------------------------+----------+---------------+----------+ ! Peroneal                            !No        !               !          ! +------------------------------------+----------+---------------+----------+ ! Gastroc                             ! Partial   !Yes            ! None      ! +------------------------------------+----------+---------------+----------+ ! GSV Thigh                           ! Yes       ! Yes            ! None      ! +------------------------------------+----------+---------------+----------+ ! GSV Knee                            ! Yes       ! Yes            ! None      ! +------------------------------------+----------+---------------+----------+ ! GSV Ankle                           ! Yes       ! Yes            ! None      ! +------------------------------------+----------+---------------+----------+ ! SSV                                 ! Partial   !Yes            ! None      ! +------------------------------------+----------+---------------+----------+ Left Doppler Measurements +---------------------------+------+------+--------------------------------+ ! Location                   ! Signal!Reflux! Reflux (msec)                   ! +---------------------------+------+------+--------------------------------+ ! Common Femoral             !Phasic!      !                                ! +---------------------------+------+------+--------------------------------+ ! Prox Femoral               !Phasic!      !                                ! +---------------------------+------+------+--------------------------------+ ! Popliteal                  !Phasic!      !                                ! +---------------------------+------+------+--------------------------------+    NM HEPATOBILIARY SCAN W PHARMACOLOGICAL INTERVENTION    Result Date: 7/11/2022  EXAMINATION: NUCLEAR MEDICINE HEPATOBILIARY SCINTIGRAPHY (HIDA SCAN). 7/11/2022 11:31 am TECHNIQUE: Approximately 4.9 mCi Tc-99m Mebrofenin (Choletec) was administered IV. Then, dynamic images of the abdomen were obtained in the anterior projection for 60 min(s). A right lateral view was also obtained at 60 min(s). The patient was in pain at the end of the 1st hour due to recent fractures. The patient was returned to her room and pain medications were given. The patient than return for a 2 hour image. COMPARISON: CT scan 07/10/2022 HISTORY: ORDERING SYSTEM PROVIDED HISTORY: acute cholecystitis TECHNOLOGIST PROVIDED HISTORY: acute cholecystitis Reason for Exam: acute cholecystitis FINDINGS: Prompt, homogenous uptake by the liver is noted with normal appearance of radiotracer excretion into the biliary system. Clearance of blood pool activity appears appropriate. Normal bowel activity was seen. There is probable accumulation of radiotracer within the gallbladder during the 1st hour, confirmed on the delayed images. The common bile duct and cystic duct are patent. No acute cholecystitis. CT CHEST ABDOMEN PELVIS W CONTRAST    Result Date: 7/11/2022  EXAMINATION: CT OF THE CHEST, ABDOMEN, AND PELVIS WITH CONTRAST 7/10/2022 2:00 pm TECHNIQUE: CT of the chest, abdomen and pelvis was performed with the administration of intravenous contrast. Multiplanar reformatted images are provided for review. Automated exposure control, iterative reconstruction, and/or weight based adjustment of the mA/kV was utilized to reduce the radiation dose to as low as reasonably achievable. COMPARISON: CT chest 04/09/2022.  HISTORY: ORDERING SYSTEM PROVIDED HISTORY: Fall TECHNOLOGIST PROVIDED HISTORY: Fall Decision Support Exception - unselect if not a suspected or confirmed emergency medical condition->Emergency Medical Condition (MA) Reason for Exam: fall, left sided chest/epigastric pain Relevant Medical/Surgical History: GERD, diverticulosis, HTN FINDINGS: Chest: Mediastinum: No evidence of a mediastinal hematoma or lymphadenopathy. No pericardial effusion. The thoracic aorta and main pulmonary artery are normal in caliber. Lungs/pleura: The central airways are patent. No pleural effusion or pneumothorax is seen. Mild bibasilar atelectasis. No evidence of a pulmonary contusion or focal lung consolidation. Soft Tissues/Bones: Subacute mildly displaced proximal sternal body fracture. There are old left-sided rib fractures. Age indeterminate buckle fracture of the right lateral 10th rib. Abdomen/Pelvis: Organs: The liver, spleen, pancreas, adrenal glands, and kidneys demonstrate no acute abnormality. There is cholelithiasis at the gallbladder neck. The gallbladder is mildly distended. GI/Bowel: Colonic diverticulosis. No bowel obstruction is seen. No definitive findings of acute appendicitis. Pelvis: The urinary bladder is unremarkable. Peritoneum/Retroperitoneum: No evidence of a retroperitoneal hematoma. Subcentimeter retroperitoneal and pelvic lymph nodes are seen without definite lymphadenopathy. No evidence of intraperitoneal free air or significant free fluid. Bones/Soft Tissues: Postsurgical changes are seen in the lower lumbar spine. Chronic appearing L5 height loss. Grade 1-2 anterolisthesis of L4 on L5. No acute bony abnormality is seen. No acute traumatic abnormality identified in the chest, abdomen, or pelvis. Subacute mildly displaced proximal sternal body fracture. Age-indeterminate buckle fracture of the right lateral 10th rib. Multiple old left-sided rib fractures. Distended gallbladder containing a stone at the gallbladder neck. Postsurgical changes in the lower lumbar spine with chronic appearing height loss of L5.      XR HIP 2-3 VW W PELVIS LEFT    Result Date: 7/11/2022  EXAMINATION: ONE X-RAY VIEW OF THE PELVIS AND TWO X-RAY VIEWS LEFT HIP 7/10/2022 2:58 pm COMPARISON: 05/22/2021 HISTORY: ORDERING SYSTEM PROVIDED HISTORY:  Fall TECHNOLOGIST PROVIDED HISTORY: Fall Reason for Exam:  Fall, left hip pain FINDINGS: Generalized osteopenia limits evaluation for nondisplaced fractures. Within this limitation, no acute fracture is identified. No evidence of dislocation. Mild to moderate bilateral hip joint degenerative changes. Again seen are postsurgical changes from lower spinal fusion. There is contrast in the urinary bladder. No acute bony abnormality identified. Assessment:   Right leg cellulitis/lymphedema  Cholelithiasis  Diabetes mellitus  Chronic kidney disease  Sulfa allergy    Recommendations:   IV ceftriaxone 1 g daily through 8/5/2022  P.o. doxycycline 100 mg twice daily through 8/12/2022  Leg elevation and compression  Follow-up in 2 weeks      Thank you for allowing me to participate in the care of your patient. Please feel free to contact me with any questions or concerns.      Dolly Mcconnell MD

## 2022-08-12 ENCOUNTER — CARE COORDINATION (OUTPATIENT)
Dept: CARE COORDINATION | Age: 72
End: 2022-08-12

## 2022-08-12 NOTE — LETTER
8/12/2022    Parke Mohs  [address] [place] [id number]      Dear Parke Mohs,    My name is Maycol Cordon RN and I am a registered nurse who partners with Idris Beasley MD to improve patients' health. Idris Beasley MD believes you would benefit from working with me. As a member of your health care team, I would work with other providers involved in your care, offer education for your specific health conditions, and connect you with additional resources as needed. I will collaborate with Idris Beasley MD to support you in following your treatment plan. The additional support I provide is no additional cost to you. My primary focus is to help you achieve specific goals and improve your health. We are committed to walk with you on this journey and look forward to working with you. Please call me to further discuss your healthcare needs. You can reach me at [phone].     In good health,     Maycol Cordon RN

## 2022-08-12 NOTE — CARE COORDINATION
Payer referral  Left  requesting a return call to AC to discuss CC. Gonzalo Patino RN, ambulatory care manager  Will mail introduction letter.

## 2022-08-16 NOTE — PLAN OF CARE
No falls noted Metronidazole Pregnancy And Lactation Text: This medication is Pregnancy Category B and considered safe during pregnancy.  It is also excreted in breast milk.

## 2022-08-17 ENCOUNTER — OFFICE VISIT (OUTPATIENT)
Dept: ORTHOPEDIC SURGERY | Age: 72
End: 2022-08-17
Payer: MEDICARE

## 2022-08-17 VITALS — WEIGHT: 180 LBS | HEIGHT: 63 IN | RESPIRATION RATE: 14 BRPM | BODY MASS INDEX: 31.89 KG/M2

## 2022-08-17 DIAGNOSIS — S62.337A CLOSED DISPLACED FRACTURE OF NECK OF FIFTH METACARPAL BONE OF LEFT HAND, INITIAL ENCOUNTER: ICD-10-CM

## 2022-08-17 DIAGNOSIS — M25.532 LEFT WRIST PAIN: Primary | ICD-10-CM

## 2022-08-17 PROCEDURE — 99204 OFFICE O/P NEW MOD 45 MIN: CPT | Performed by: PHYSICIAN ASSISTANT

## 2022-08-17 PROCEDURE — 1123F ACP DISCUSS/DSCN MKR DOCD: CPT | Performed by: PHYSICIAN ASSISTANT

## 2022-08-17 NOTE — PROGRESS NOTES
[facility], [address] [place] [address] Road [address] [place] [address], [address] [place] [address], [id number] [place] [address]           Dept Phone: [phone]           Dept Fax:  [phone] [facility], [place]          Dept Phone: [phone]           Dept Fax:  [phone]    Chief Compliant:  Chief Complaint   Patient presents with    Hand Pain     L Hand    Wrist Pain     L wrist        Subjective:       Thien Lawton is a 71 y.o. left hand-dominant female here for evaluation and treatment of a left 5th metacarpal injury. The injury occurred approximately 2 weeks ago. Mechanism of injury was: [address] in shower room at rehab facility. Since that time the patient has been experiencing left hand pain and swelling. The pain is currently rated mild. The patient was originally seen at local [facility] clinic where an x-ray was done, a fracture of the hand was identified and patient was placed in an Ace wrap and given referral to orthopedics which brings her in today. Initial x-rays available for review from 8/10/2022 are brought in on disc available for review on PACS. Patient does present in Ace wrap stating that her pain is overall tolerable but she does no obvious deformity significant swelling or bruising to the hand. She reports she is left-hand dominant has made it difficult for her activities of daily living. Patient is currently at rehab facility for history of CVA and cervical myelopathy. Outside reports reviewed: images taken at rehab facility from 8/10/22. Patient's medications, allergies, past medical, surgical, social and family histories were reviewed and updated as appropriate. Review of Systems  Review of Systems   Constitutional: Negative for fever, chills, sweats, recent illness, or recent injury. Neurological: Negative for headaches, numbness, or weakness. Integumentary: Negative for rash, itching, ecchymosis, abrasions, or laceration. Musculoskeletal: Positive for Hand Pain (L Hand) and Wrist Pain (L wrist)        Objective:   Physical Exam:  Constitutional: Patient is oriented to person, place, and time. Patient appears well-developed and well nourished. Musculoskeletal:       General:   alert, appears stated age, and cooperative   Gait:    Not visualized   Left Hand  Circulation:   warm, well perfused, brisk capillary refill distal to the injury   Skin:   ecchymosis   Swelling:  pain is perceived as moderate (4-6 pain scale) swelling was present in the hand   Deformity:  There is an obvious deformity of the hand. Significant shortening and rotational deformity of fifth compared to fourth digit. Finger ROM:   normal first through third, fourth and fifth digit range of motion not assessed today   Wrist ROM:  wrist flexion and extension was not assessed   Sensation:   intact to light touch   Tenderness:    Point tenderness to the ulnar aspect of the hand most severe over the fifth metacarpal neck fracture site. Neurological: Patient is alert and oriented to person, place, and time. Normal strenght. No sensory deficit. Skin: Skin is warm and dry  Psychiatric: Behavior is normal. Thought content normal.  Nursing note and vitals reviewed. Imaging  Labs and Imaging:     XR taken today:  No results found. X-rays taken in clinic and preliminarily reviewed by me 8/17/22:  3 views of the left wrist demonstrate a comminuted, shortened significant dorsal angulation of the left fifth metacarpal neck/shaft fracture. Patient with evidence of old injury to the ulnar styloid. Volar plate is present from old distal radius injury     Assessment:     1. Displaced fracture of neck of fifth metacarpal bone, left hand, initial encounter for closed fracture    2. Left wrist pain    3.  Closed displaced fracture of neck of fifth metacarpal bone of left hand, initial

## 2022-08-18 ENCOUNTER — CARE COORDINATION (OUTPATIENT)
Dept: CARE COORDINATION | Age: 72
End: 2022-08-18

## 2022-08-18 NOTE — CARE COORDINATION
Payer referral  Left 2nd  requesting a return call to AC to discuss CC.    Arik Altman RN, ambulatory care manager  introduction letter mailed today

## 2022-08-22 ENCOUNTER — TELEPHONE (OUTPATIENT)
Dept: INTERNAL MEDICINE CLINIC | Age: 72
End: 2022-08-22

## 2022-08-22 ENCOUNTER — CARE COORDINATION (OUTPATIENT)
Dept: CARE COORDINATION | Age: 72
End: 2022-08-22

## 2022-08-22 RX ORDER — GABAPENTIN 100 MG/1
200 CAPSULE ORAL 2 TIMES DAILY
Qty: 120 CAPSULE | Refills: 0 | Status: SHIPPED | OUTPATIENT
Start: 2022-08-22 | End: 2022-10-10

## 2022-08-22 SDOH — ECONOMIC STABILITY: FOOD INSECURITY: WITHIN THE PAST 12 MONTHS, YOU WORRIED THAT YOUR FOOD WOULD RUN OUT BEFORE YOU GOT MONEY TO BUY MORE.: NEVER TRUE

## 2022-08-22 SDOH — ECONOMIC STABILITY: TRANSPORTATION INSECURITY
IN THE PAST 12 MONTHS, HAS THE LACK OF TRANSPORTATION KEPT YOU FROM MEDICAL APPOINTMENTS OR FROM GETTING MEDICATIONS?: NO

## 2022-08-22 SDOH — ECONOMIC STABILITY: FOOD INSECURITY: WITHIN THE PAST 12 MONTHS, THE FOOD YOU BOUGHT JUST DIDN'T LAST AND YOU DIDN'T HAVE MONEY TO GET MORE.: NEVER TRUE

## 2022-08-22 SDOH — HEALTH STABILITY: PHYSICAL HEALTH: ON AVERAGE, HOW MANY DAYS PER WEEK DO YOU ENGAGE IN MODERATE TO STRENUOUS EXERCISE (LIKE A BRISK WALK)?: 0 DAYS

## 2022-08-22 SDOH — ECONOMIC STABILITY: INCOME INSECURITY: IN THE LAST 12 MONTHS, WAS THERE A TIME WHEN YOU WERE NOT ABLE TO PAY THE MORTGAGE OR RENT ON TIME?: NO

## 2022-08-22 SDOH — ECONOMIC STABILITY: TRANSPORTATION INSECURITY
IN THE PAST 12 MONTHS, HAS LACK OF TRANSPORTATION KEPT YOU FROM MEETINGS, WORK, OR FROM GETTING THINGS NEEDED FOR DAILY LIVING?: NO

## 2022-08-22 SDOH — ECONOMIC STABILITY: HOUSING INSECURITY
IN THE LAST 12 MONTHS, WAS THERE A TIME WHEN YOU DID NOT HAVE A STEADY PLACE TO SLEEP OR SLEPT IN A SHELTER (INCLUDING NOW)?: NO

## 2022-08-22 SDOH — ECONOMIC STABILITY: HOUSING INSECURITY: IN THE LAST 12 MONTHS, HOW MANY PLACES HAVE YOU LIVED?: 1

## 2022-08-22 SDOH — HEALTH STABILITY: PHYSICAL HEALTH: ON AVERAGE, HOW MANY MINUTES DO YOU ENGAGE IN EXERCISE AT THIS LEVEL?: 0 MIN

## 2022-08-22 NOTE — CARE COORDINATION
Ambulatory Care Coordination Note  8/22/2022    ACC: Santosh Campos RN  Payer referral     Summary Note: Spoke with patient, who called ACM after receiving the introduction letter. explained care coordination. Patient is accepting of program.  Patient reports have several falls recently. She does have a home health aide and uses a walker. She declined the need for transportation. She stated that she is out of Eliquis and it has not come in the mail yet. AC was able to secure some samples at PCP office for patient to . She requested a refill of gabapentin, will pend to provider. Follow up appt is scheduled. ACM will follow up in 1 week to continue education and provide support. Ambulatory Care Coordination Assessment    Care Coordination Protocol  Referral from Primary Care Provider: No  Week 1 - Initial Assessment     Do you have all of your prescriptions and are they filled?: Yes (Comment: waiting for a couple from her mail order pharmacy)  Are you able to afford your medications?: Yes  How often do you have trouble taking your medications the way you have been told to take them?: I always take them as prescribed. Ability to seek help/take action for Emergent Urgent situations i.e. fire, crime, inclement weather or health crisis. : Independent  Ability to ambulate to restroom: Independent  Ability handle personal hygeine needs (bathing/dressing/grooming): Independent  Ability to manage Medications: Independent  Ability to prepare Food Preparation: Independent  Ability to maintain home (clean home, laundry): Independent  Ability to drive and/or has transportation: Independent  Ability to do shopping: Independent  Ability to manage finances:  Independent  Is patient able to live independently?: Yes     Current Housing: Private Residence              Are you experiencing loss of meaning?: No  Are you experiencing loss of hope and peace?: No     Thinking about your patient's physical health needs, are there any symptoms or problems (risk indicators) you are unsure about that require further investigation?: No identified areas of uncertainly or problems already being investigated   Are the patients physical health problems impacting on their mental well-being?: No identified areas of concern   Are there any problems with your patients lifestyle behaviors (alcohol, drugs, diet, exercise) that are impacting on physical or mental well-being?: No identified areas of concern   Do you have any other concerns about your patients mental well-being? How would you rate their severity and impact on the patient?: No identified areas of concern   How would you rate their home environment in terms of safety and stability (including domestic violence, insecure housing, neighbor harassment)?: Consistently safe, supportive, stable, no identified problems   How do daily activities impact on the patient's well-being? (include current or anticipated unemployment, work, caregiving, access to transportation or other): Some general dissatisfaction but no concern   How would you rate their social network (family, work, friends)?: Adequate participation with social networks   How would you rate their financial resources (including ability to afford all required medical care)?: Financially secure, some resource challenges   How wells does the patient now understand their health and well-being (symptoms, signs or risk factors) and what they need to do to manage their health?: Reasonable to good understanding and already engages in managing health or is willing to undertake better management   How well do you think your patient can engage in healthcare discussions?  (Barriers include language, deafness, aphasia, alcohol or drug problems, learning difficulties, concentration): Clear and open communication, no identified barriers   Do other services need to be involved to help this patient?: Other care/services in place and by mouth 2 times daily 4/10/22   Rachelle Hernandez MD   amLODIPine [facility]) 5 MG tablet Take 1 tablet by mouth daily 4/10/22 7/21/22  Rachelle Hernandez MD   acetaminophen (TYLENOL) 325 MG tablet Take 2 tablets by mouth every 4 hours as needed for Pain 4/7/22   Rachelle Hernandez MD   melatonin 3 MG TABS tablet Take 2 tablets by mouth nightly as needed (insomnia) 4/7/22   Rachelle Hernandez MD   nitroGLYCERIN (NITROSTAT) 0.4 MG SL tablet Place 1 tablet under the tongue every 5 minutes as needed for Chest pain 9/1/21   Laurita Lieberman MD   cyanocobalamin ([facility] VITAMIN B12) 1000 MCG tablet Take 1 tablet by mouth daily  Patient taking differently: Take 1,000 mcg by mouth at bedtime 12/3/20   Laurita Lieberman MD   ferrous sulfate (IRON 325) 325 (65 Fe) MG tablet Take 1 tablet by mouth 2 times daily 7/2/20   Elizabeth Silva MD   VITAMIN D, ERGOCALCIFEROL, PO Take by mouth nightly    Historical Provider, MD   Lancets MISC 1 each by Does not apply route daily 10/10/19   Victorino Baker MD   blood glucose monitor strips Test 2 times a day & as needed for symptoms of irregular blood glucose.  10/10/19   Victorino Baker MD   Calcium Carbonate-Vitamin D 500-125 MG-UNIT TABS Take 1 tablet by mouth nightly     Historical Provider, MD       Future Appointments   Date Time Provider Britni aMrshall   8/22/2022  2:30 PM Brian Bragg MD [facility] None   8/23/2022 10:00 AM DO Lisy Dowling Neuro [facility]   8/24/2022 11:15 AM Yrn Correa MD [facility]   9/1/2022  1:30 PM Traci Ramon MD [address]   9/21/2022 12:00 PM Lena Dawson MD Neuro [facility]     ,   Diabetes Assessment      Meal Planning: Other   How often do you test your blood sugar?: Other       No patient-reported symptoms        , and   General Assessment    Do you have any symptoms that are causing concern?: No

## 2022-08-22 NOTE — TELEPHONE ENCOUNTER
Patients home health nurse called in today stating that patient feel twice yesterday, once was with hitting her head. Patient is currently on eliquis, she denies that the patient has an bump or bruising's on the body. Patient denies any headache or any injury to the head, Please advise if anything further needs to be done.  Requested to call patient back not the home health nurse

## 2022-08-23 ENCOUNTER — OFFICE VISIT (OUTPATIENT)
Dept: NEUROSURGERY | Age: 72
End: 2022-08-23
Payer: MEDICARE

## 2022-08-23 ENCOUNTER — ANESTHESIA EVENT (OUTPATIENT)
Dept: OPERATING ROOM | Age: 72
End: 2022-08-23
Payer: MEDICARE

## 2022-08-23 VITALS
DIASTOLIC BLOOD PRESSURE: 85 MMHG | RESPIRATION RATE: 20 BRPM | HEIGHT: 63 IN | HEART RATE: 63 BPM | WEIGHT: 180 LBS | OXYGEN SATURATION: 95 % | SYSTOLIC BLOOD PRESSURE: 178 MMHG | BODY MASS INDEX: 31.89 KG/M2

## 2022-08-23 DIAGNOSIS — R26.81 GAIT INSTABILITY: ICD-10-CM

## 2022-08-23 DIAGNOSIS — R27.0 ATAXIA: Primary | ICD-10-CM

## 2022-08-23 DIAGNOSIS — Z98.1 S/P CERVICAL SPINAL FUSION: ICD-10-CM

## 2022-08-23 PROCEDURE — 99213 OFFICE O/P EST LOW 20 MIN: CPT | Performed by: NEUROLOGICAL SURGERY

## 2022-08-23 PROCEDURE — 1123F ACP DISCUSS/DSCN MKR DOCD: CPT | Performed by: NEUROLOGICAL SURGERY

## 2022-08-23 NOTE — PROGRESS NOTES
Department of Neurosurgery                                                      Follow up visit      History Obtained From:  patient, spouse    CHIEF COMPLAINT:         Chief Complaint   Patient presents with    Follow-up       HISTORY OF PRESENT ILLNESS:       The patient is a 71 y.o. female who presents for follow up for ataxia and gait instability. She was seen by neurology and was deemed that she does not have Parkinson's. Patient reports hx of several falls. Patient reports a fall that resulted in a broken rib and sternum, and cellulites in the left leg, where she was treated at [facility]. Patient's  reports that patient walks with her legs stiff and she has difficulty with balance. Patient denies pain while walking. Patient uses a walker and wheelchair. Patient reports she still falls while using a walker. Patient denies lower back pain. Patient reports no improvement in strength of left hand since surgery. Patient denies confusion or trouble swallowing. Patient has a small, mobile, slightly firm, non-tender subdermal mass on dorsal aspect of right arm near the axilla. Patient reports neurologist, Dr. Gibson Smith, denies she has Parkinson's disease. PAST MEDICAL HISTORY :       Past Medical History:        Diagnosis Date    Allergic rhinitis, cause unspecified     Back pain     lumbar    Bowel obstruction (HCC)     history of due to scar tissue, resolved non-surgically    C. difficile diarrhea     CAD (coronary artery disease)     no stent needed per pt.  Dr. Davon Dueñas did cath at  2005    Cardiac murmur     Cellulitis     left leg    Cellulitis 2017 August    leg left leg/bug bite    Cerebral artery occlusion with cerebral infarction [facility])     TIA 2014    COVID-19     ONE YR AGO IN 4/25/2020 fever and cough    Diverticulosis of colon (without mention of hemorrhage)     GERD (gastroesophageal reflux disease)     GERD (gastroesophageal reflux disease)     on rx History of blood transfusion     approx 2020        History of CHF (congestive heart failure)     History of MI (myocardial infarction) 2005    thought due to a blood clot    History of ovarian cyst 1970    had oopherectomy holly    History of peritonitis 1968    due to ruptured appendix age 13    HTN (hypertension)     Hx of blood clots     right leg    Hyperlipidemia     Intestinal or peritoneal adhesions with obstruction (postoperative) (postinfection) ([address]     Kidney infection     renal failure/sepsis/spider bite    Lateral epicondylitis  of elbow     MDRO (multiple drug resistant organisms) resistance     c diff    Muscle strain     right posterior shoulder    Other abnormal glucose     PONV (postoperative nausea and vomiting)     dry heaves    Pre-diabetes     Restless legs syndrome (RLS)     Snores     no cpap    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)     TIA (transient ischemic attack) 2014    Uses walker     Vitamin D deficiency     Wears glasses     Wellness examination     last seen 2 weeks ago       Past Surgical History:        Procedure Laterality Date    ABDOMEN SURGERY  1976    benign tumor removed near remaining ovary, 1.5 pounds    APPENDECTOMY  1968    appendix ruptured, developed peritonitis    BACK SURGERY      BUNIONECTOMY Left     along with calcium deposits removed    CARDIAC CATHETERIZATION      CARDIAC CATHETERIZATION  2005    negative    CERVICAL FUSION  05/21/2021    POSTERIOR C3-6 LAMINECTOMY, PARTIAL C7 LAMINECTOMY, FUSION C3-C6, SILVERCORD    CERVICAL FUSION N/A 05/21/2021    POSTERIOR C3-6 LAMINECTOMY, PARTIAL C7 LAMINECTOMY, FUSION C3-C6, SILVERCORD performed by Nick Pierre DO at [address] [place] [address]    12 INCHES REMOVED D/T OBSTRUCTION    COLONOSCOPY      CYST REMOVAL Right     right facial    FINGER CLOSED REDUCTION Left 08/24/2022    LEFT LITTLE FINGER CLOSED REDUCTION PINNING (Left: Little Finger)    FINGER CLOSED REDUCTION Left 8/24/2022    CLOSED REDUCTION PINNING LEFT LITTLE FINGER performed by Tamiko Edwards MD at . [address] ([facility])  1973    taken as a result of recurring cysts    LUMBAR FUSION N/A 02/10/2020    LUMBAR L4-5 POSTERIOR  DECOMPRESSION INSTRUMENTATION FUSION WCEMENT AUGMENTATION/ performed by Silverio Gasca MD at [facility] N/A 2020    L5-S1 PLIF L4-L5 REVISION performed by Silverio Gasca MD at . [address]  2014    [address]    UNILATERAL due to cyst    OVARY REMOVAL      partial, due to cyst    SINUS SURGERY      UPPER GASTROINTESTINAL ENDOSCOPY N/A 2019    EGD ESOPHAGOGASTRODUODENOSCOPY performed by Destiney Macdonald MD at  [facility] N/A 2019    EGD BIOPSY performed by Ifeoma Collado MD at  [facility] N/A 2019    EGD BIOPSY performed by Destiney Macdonald MD at [address] Left 2019    WRIST OPEN REDUCTION INTERNAL FIXATION performed by Deepthi Meraz MD at [address] [place] [address] History:   Social History     Socioeconomic History    Marital status:      Spouse name: Not on file    Number of children: Not on file    Years of education: Not on file    Highest education level: Not on file   Occupational History    Occupation: retired   Tobacco Use    Smoking status: Former     Packs/day: 0.50     Years: 20.00     Pack years: 10.00     Types: Cigarettes     Start date: 1995     Quit date: 2017     Years since quittin.2    Smokeless tobacco: Never   Vaping Use    Vaping Use: Never used   Substance and Sexual Activity    Alcohol use: No     Alcohol/week: 0.0 standard drinks    Drug use: No    Sexual activity: Not on file   Other Topics Concern    Not on file   Social History Narrative    Not on file     Social Determinants of Health     Financial Resource Strain: Low Risk Difficulty of Paying Living Expenses: Not hard at all   Food Insecurity: No Food Insecurity    Worried About [facility] in the Last Year: Never true    Ran Out of Food in the Last Year: Never true   Transportation Needs: No Transportation Needs    Lack of Transportation (Medical): No    Lack of Transportation (Non-Medical): No   Physical Activity: Inactive    Days of Exercise per Week: 0 days    Minutes of Exercise per Session: 0 min   Stress: Not on file   Social Connections: Not on file   Intimate Partner Violence: Not on file   Housing Stability: Low Risk     Unable to Pay for Housing in the Last Year: No    Number of Places Lived in the Last Year: 1    Unstable Housing in the Last Year: No       Family History:       Problem Relation Age of Onset    Stroke Mother     Diabetes Mother     Heart Disease Mother     High Blood Pressure Mother     Heart Disease Father     Heart Disease Brother     High Blood Pressure Brother     Heart Disease Maternal Grandmother     High Blood Pressure Sister        Allergies:  Bactrim [sulfamethoxazole-trimethoprim], Codeine, Diazepam, Meperidine hcl, and Seasonal    Home Medications:  Prior to Admission medications    Medication Sig Start Date End Date Taking? Authorizing Provider   gabapentin (NEURONTIN) 100 MG capsule Take 2 capsules by mouth 2 times daily for 30 days.  8/22/22 9/21/22 Yes Master Sauceda MD   apixaban (ELIQUIS) 5 MG TABS tablet Take 1 tablet by mouth 2 times daily 8/17/22  Yes Mastre Sauceda MD   pantoprazole (PROTONIX) 40 MG tablet Take 1 tablet by mouth every morning (before breakfast) 7/19/22  Yes Ashish Lopez MD   busPIRone (BUSPAR) 5 MG tablet Take 1 tablet by mouth 3 times daily 7/5/22  Yes Elwin Aase, MD   pramipexole (MIRAPEX) 0.5 MG tablet Take 1 tablet by mouth nightly 7/5/22  Yes Elwin Aase, MD   furosemide (LASIX) 20 MG tablet Take 1 tablet by mouth daily 6/29/22  Yes Elwin Aase, MD   citalopram (CELEXA) 20 MG tablet Take 1 tablet by mouth daily 6/27/22  Yes Fredi Gusman MD   fenofibrate micronized (LOFIBRA) 134 MG capsule Take 1 capsule by mouth every morning (before breakfast) 5/23/22  Yes Fredi Gusman MD   carvedilol (COREG) 12.5 MG tablet Take 1 tablet by mouth 2 times daily 4/10/22  Yes Regina Odonnell MD   albuterol-ipratropium (COMBIVENT RESPIMAT)  MCG/ACT AERS inhaler Inhale 1 puff into the lungs every 6 hours as needed for Wheezing or Shortness of Breath  Patient not taking: Reported on 9/6/2022 4/10/22 9/9/22 Yes Regina Odonnell MD   melatonin 3 MG TABS tablet Take 2 tablets by mouth nightly as needed (insomnia) 4/7/22  Yes Regina Odonnell MD   acetaminophen (TYLENOL) 325 MG tablet Take 2 tablets by mouth every 4 hours as needed for Pain 4/7/22 9/9/22 Yes Regina Odonnell MD   nitroGLYCERIN (NITROSTAT) 0.4 MG SL tablet Place 1 tablet under the tongue every 5 minutes as needed for Chest pain 9/1/21  Yes Fredi Gusman MD   cyanocobalamin ([employer] VITAMIN B12) 1000 MCG tablet Take 1 tablet by mouth daily 12/3/20  Yes Fredi Gusman MD   Lancets MISC 1 each by Does not apply route daily 10/10/19  Yes Farhan Snow MD   blood glucose monitor strips Test 2 times a day & as needed for symptoms of irregular blood glucose. 10/10/19  Yes Farhan Snow MD   Calcium Carbonate-Vitamin D 500-125 MG-UNIT TABS Take 1 tablet by mouth nightly    Yes Historical Provider, MD   traZODone (DESYREL) 50 MG tablet TAKE 1 TABLET BY MOUTH EVERY NIGHT AS NEEDED FOR SLEEP 9/10/22   Sotero Medina MD   HYDROcodone-acetaminophen (NORCO) 5-325 MG per tablet Take 1 tablet by mouth every 8 hours as needed for Pain for up to 3 days.  9/9/22 9/12/22  Milena Bazan MD   atorvastatin (LIPITOR) 20 MG tablet Take 1 tablet by mouth nightly 9/9/22   Milena Bazan MD   polyethylene glycol (GLYCOLAX) 17 g packet Take 17 g by mouth daily as needed for Constipation 9/9/22 10/9/22  Milena Bazan MD   ferrous sulfate (IRON 325) 325 (65 Fe) MG tablet Take 325 mg by mouth daily (with breakfast)    Historical Provider, MD   vitamin D (CHOLECALCIFEROL) 25 MCG (1000 UT) TABS tablet Take 1,000 Units by mouth at bedtime    Historical Provider, MD   Skin Protectants, Misc. (HYDROCERIN) CREA cream Apply topically 2 times daily 7/18/22   Pinky Klein MD   amLODIPine (NORVASC) 5 MG tablet Take 1 tablet by mouth daily 4/10/22 7/21/22  Yuly Jeffery MD       Current Medications:   No current facility-administered medications for this visit. PHYSICAL EXAM:       BP (!) 178/85   Pulse 63   Resp 20   Ht 5' 3\" (1.6 m)   Wt 180 lb (81.6 kg)   SpO2 95%   BMI 31.89 kg/m²   Physical Exam     Gen: NAD  HEENT: moist mucus membranes  Cardio: RRR  Pulm: chest rise symmetrically  GI: abd soft  Ext: no edema  Skin: warm    Neuro:    AOX3  CN 2-12 grossly intact  Speech articulate  No pronator drift  Sensation symmetrical   Negative Robles's sign  Motor 5/5 except  Shuffling gait with hyperextention of left knee and stooped over posture          Radiology Review:    XR cervical spine flexion and extension  05/10/2022  Official read:  Posterior interbody fusion C3 through C6. Slight anterior subluxation C6-7. No ena instability. XR chest portable   06/05/2022  Official read:  No acute process. CT head WO contrast  [id number]  Official read:  No acute intracranial abnormality. CT cervical spine WO contrast  06/05/2022  Official read:  No acute osseous abnormality of the cervical spine. No significant change  from prior exam    VL lower extremity bilateral venous duplex  06/05/2022  Official read:  Right Impression:  The common femoral, proximal femoral, and popliteal veins  demonstrate normal compressibility. Normal compressibility of the great saphenous vein. Left Impression:  The common femoral, proximal femoral, and popliteal veins  demonstrate normal compressibility. Normal compressibility of the great saphenous vein.     CT head WO contrast  07/10/2022  Official read:  No acute intracranial abnormality. CT facial bones WO contrast  07/10/2022  Official read:  No acute facial bone trauma. Mild nonspecific inflammatory changes bilateral maxillary sinuses. CT cervical spine WO contrast  07/10/2022  Official read:  1. Kyphosis of the cervical spine centered at C6-C7. 2. Mild multilevel degenerative changes in the cervical spine primarily at  C5-C6. 3. No acute vertebral body height loss in the cervical spine. 4. Evidence of posterior spinal fusion from C3-C6. CT chest abdomen pelvis W contrast  07/10/2022  Official read:  No acute traumatic abnormality identified in the chest, abdomen, or pelvis. Subacute mildly displaced proximal sternal body fracture. Age-indeterminate buckle fracture of the right lateral 10th rib. Multiple  old left-sided rib fractures. Distended gallbladder containing a stone at the gallbladder neck. Postsurgical changes in the lower lumbar spine with chronic appearing height  loss of L5. XR hip 2-3 view W pelvis left  07/10/2022  Official read:  No acute bony abnormality identified. XR chest (2 view)  07/11/2022  Official read:  Left basilar atelectasis or infiltrate. Healing sternal fracture. US gallbladder RUQ  07/11/2022  Official read:  Cholelithiasis. No acute findings. NM hepatobiliary scan W pharmacological intervention  07/11/2022  Offiicial read: The common bile duct and cystic duct are patent. No acute cholecystitis. VL lower extremity bilateral venous duplex  07/12/2022  Official read:  Right Impression:  Non visualization of the peroneal veins. Remaining deep veins  demonstrate normal compressibility. Normal compressibility of the great saphenous vein. Normal compressibility of the small saphenous vein. Left Impression:  Non visualization of the peroneal veins. Remaining deep veins  demonstrate normal compressibility.   Normal compressibility of the great saphenous vein.  Normal compressibility of the small saphenous vein. CT femur right W contrast  07/20/2022  Official read:  1. Subcutaneous fat stranding of the thigh and proximal lower leg as above  compatible with lymph edema or cellulitis. No abscess or soft tissue gas. 2. No acute osseous abnormality.     My read:      ASSESSMENT AND PLAN:       Patient Active Problem List   Diagnosis    Other specified disorders of rotator cuff syndrome of shoulder and allied disorders    Diverticulosis of large intestine    Intestinal or peritoneal adhesions with obstruction (postoperative) (postinfection) (HCC)    Restless legs syndrome (RLS)    GERD (gastroesophageal reflux disease)    Essential hypertension    Mixed hyperlipidemia    Other abnormal glucose    Atherosclerosis    Allergic rhinitis    Vitamin D deficiency    Depression    Degenerative joint disease (DJD) of hip    Peripheral edema    Injury of foot, left    Facial droop    CVA (cerebral vascular accident) ([address]    Bradycardia    Ataxia    Aphasia    TIA (transient ischemic attack)    Need for prophylactic vaccination and inoculation against cholera alone    Osteopenia    Lumbago    Meralgia paresthetica of right side    Lumbar degenerative disc disease    Spondylosis of lumbar region without myelopathy or radiculopathy    Blood poisoning    Cellulitis of left lower extremity    Encounter for medication monitoring    Deep vein thrombosis (DVT) of right lower extremity (HCC)    Chronic deep vein thrombosis (DVT) of proximal vein of both lower extremities (HCC)    Chronic diastolic heart failure (HCC)    Neurogenic claudication due to lumbar spinal stenosis    Sacroiliitis (HCC)    Stage 3 chronic kidney disease ([address]    Type 2 diabetes mellitus with circulatory disorder, without long-term current use of insulin (HCC)    Chronic deep vein thrombosis (DVT) of popliteal vein of right lower extremity (HCC)    Closed fracture of left wrist    B12 deficiency    Iron deficiency anemia secondary to inadequate dietary iron intake    Closed head injury    Scalp laceration    Abnormal finding on imaging    Other irritable bowel syndrome    Frequent falls    Double vision    Muscle soreness    Peptic ulcer    Melena    PUD (peptic ulcer disease)    Absolute anemia    Acute blood loss anemia    Acute renal failure (HCC)    Clostridium difficile infection    Acquired spondylolisthesis    Spinal stenosis of lumbar region with neurogenic claudication    Acute deep vein thrombosis (DVT) of proximal vein of left lower extremity (HCC)    Leg swelling    Back pain    Neurological disorder    Closed unstable burst fracture of fifth lumbar vertebra with routine healing    Slow transit constipation    Major depressive disorder, recurrent, moderate (HCC)    Acute deep vein thrombosis (DVT) of femoral vein of left lower extremity (HCC)    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)    Acute deep vein thrombosis (DVT) of right femoral vein (HCC)    S/P cervical spinal fusion    Gait instability    Cervical myelopathy (HCC)    Cellulitis of both lower extremities    Fracture of body of sternum, initial encounter for open fracture    Nondisplaced fracture of sternal end of left clavicle, initial encounter for closed fracture    Erysipelas of right lower extremity    Closed fracture of body of sternum    Calculus of gallbladder without cholecystitis without obstruction    Leukocytosis    Type 2 diabetes mellitus with stage 3 chronic kidney disease (HCC)    Allergy to sulfa drugs    Bilateral cellulitis of lower leg    Lymphedema of both lower extremities    Cerebral infarction, unspecified ([address]    Chronic embolism and thrombosis of unspecified deep veins of proximal lower extremity, bilateral ([address]    Other specified disorders of bone density and structure, unspecified site    Repeated falls    Fall as cause of accidental injury in home as place of occurrence, initial encounter         A/P: This is a 71 y.o. female with Ataxia  -     External Referral To Physical Therapy  Gait instability  -     External Referral To Physical Therapy  S/P cervical spinal fusion   She continues to have poor gait stability despite adequate surgical decompression of cervical stenosis and there is no other areas of cord compression    I will call Dr. Steve Miranda to discuss diagnoses. I made a referral for PT to improve gait and strength. I recommend the patient should make an appointment with Dr. Matthias Iraheta. I thoroughly discussed details of diagnosis, natural histories of disease, and treatment options. I used the imaging to depict the diagnosis to the patient. Patient and/or family was counseled on the diagnosis and treatment plan    By signing my name below, I, Evita Tyler, attest that this documentation has been prepared under the direction and in the presence of Vanesa Madrid DO. Electronically signed: Wil Thomas, 8/23/22      This note was created using voice recognition software. There may be inaccuracies of transcription  that are inadvertently overlooked prior to the signature. There is any questions about the transcription please contact me.

## 2022-08-23 NOTE — DISCHARGE INSTRUCTIONS
DISCHARGE INSTRUCTIONS FOR HAND/WRIST SURGERY    In order to continue your care at home, please follow the instructions below. For General Anesthesia  Do not drink any alcoholic beverages or make any legal or important decisions for 24 hours. Diet    After general anesthesia, start out eating lightly (broth, soup, crackers, toast, etc.) advancing as tolerated to your usual diet. Try to avoid spicy or greasy/fatty foods for 24 hours. Drink plenty of fluids after surgery, unless you are on a fluid restriction. Avoid milk/milk product for several hours. Medications  Take medications as instructed by your surgeon. Please do not take prescribed pain medication with alcoholic beverages. When cleared to drive by your surgeon, please do not drive or operate machinery while taking any prescribed pain medication. Activities  As instructed by your surgeon  Limit your activities for 24 hours  Avoid heavy work or sports until surgeon approves. No lifting, pushing, pulling, twisting, or pressing down on hand or wrist.  No driving or operating machinery until cleared by surgeon. May shower as long as dressings stay dry with plastic bag coverage. Surgery Area  Always keep dressings/incisions clean, dry. Do not remove dressings until seen in office, unless instructed otherwise by your surgeon. To reduce swelling and pain keep surgical hand/wrist elevated above heart level with pillows. Call your surgeon for the following: You have pain that does not get better after you take pain medicine. For an oral temperature (by mouth) is 101 degrees or higher, chills, or excessive sweating. You have increasing and progressive bleeding or drainage from surgery site. Signs of an infection:  increased swelling, redness, warmth, or hardness around surgery area or yellow or green drainage from incision. If your fingers are pale, blue or cold to touch. If swelling increases while elevated.   Persistent nausea or vomiting and cant keep fluids down. If you are unable to urinate within 8 hours of surgery. Redness or swelling at IV site. For any questions or concerns you may have.

## 2022-08-23 NOTE — TELEPHONE ENCOUNTER
Talked with patient, she states that she is going in for hand surgery tomorrow and that she will keep her appointment next week

## 2022-08-24 ENCOUNTER — CARE COORDINATION (OUTPATIENT)
Dept: CARE COORDINATION | Age: 72
End: 2022-08-24

## 2022-08-24 ENCOUNTER — HOSPITAL ENCOUNTER (OUTPATIENT)
Age: 72
Setting detail: OUTPATIENT SURGERY
Discharge: HOME | End: 2022-08-24
Attending: PLASTIC SURGERY | Admitting: PLASTIC SURGERY
Payer: MEDICARE

## 2022-08-24 ENCOUNTER — ANESTHESIA (OUTPATIENT)
Dept: OPERATING ROOM | Age: 72
End: 2022-08-24
Payer: MEDICARE

## 2022-08-24 VITALS
HEART RATE: 73 BPM | WEIGHT: 177.5 LBS | SYSTOLIC BLOOD PRESSURE: 158 MMHG | TEMPERATURE: 97.1 F | BODY MASS INDEX: 31.45 KG/M2 | HEIGHT: 63 IN | OXYGEN SATURATION: 96 % | DIASTOLIC BLOOD PRESSURE: 81 MMHG | RESPIRATION RATE: 16 BRPM

## 2022-08-24 DIAGNOSIS — G89.18 PAIN FOLLOWING SURGERY OR PROCEDURE: Primary | ICD-10-CM

## 2022-08-24 PROCEDURE — 7100000011 HC PHASE II RECOVERY - ADDTL 15 MIN: Performed by: PLASTIC SURGERY

## 2022-08-24 PROCEDURE — 3600000012 HC SURGERY LEVEL 2 ADDTL 15MIN: Performed by: PLASTIC SURGERY

## 2022-08-24 PROCEDURE — 3600000002 HC SURGERY LEVEL 2 BASE: Performed by: PLASTIC SURGERY

## 2022-08-24 PROCEDURE — 2500000003 HC RX 250 WO HCPCS

## 2022-08-24 PROCEDURE — 6360000002 HC RX W HCPCS: Performed by: ANESTHESIOLOGY

## 2022-08-24 PROCEDURE — 6370000000 HC RX 637 (ALT 250 FOR IP): Performed by: PLASTIC SURGERY

## 2022-08-24 PROCEDURE — 3700000001 HC ADD 15 MINUTES (ANESTHESIA): Performed by: PLASTIC SURGERY

## 2022-08-24 PROCEDURE — 7100000000 HC PACU RECOVERY - FIRST 15 MIN: Performed by: PLASTIC SURGERY

## 2022-08-24 PROCEDURE — 2500000003 HC RX 250 WO HCPCS: Performed by: PLASTIC SURGERY

## 2022-08-24 PROCEDURE — 7100000010 HC PHASE II RECOVERY - FIRST 15 MIN: Performed by: PLASTIC SURGERY

## 2022-08-24 PROCEDURE — 6360000002 HC RX W HCPCS

## 2022-08-24 PROCEDURE — 3700000000 HC ANESTHESIA ATTENDED CARE: Performed by: PLASTIC SURGERY

## 2022-08-24 PROCEDURE — 2580000003 HC RX 258: Performed by: ANESTHESIOLOGY

## 2022-08-24 PROCEDURE — 2500000003 HC RX 250 WO HCPCS: Performed by: ANESTHESIOLOGY

## 2022-08-24 PROCEDURE — 6370000000 HC RX 637 (ALT 250 FOR IP): Performed by: ANESTHESIOLOGY

## 2022-08-24 PROCEDURE — 2709999900 HC NON-CHARGEABLE SUPPLY: Performed by: PLASTIC SURGERY

## 2022-08-24 DEVICE — K WIRE FIX L6IN DIA0.045IN 1600645] MICROAIRE SURGICAL INSTRUMENTS INC]: Type: IMPLANTABLE DEVICE | Site: LITTLE FINGER | Status: FUNCTIONAL

## 2022-08-24 RX ORDER — PROPOFOL 10 MG/ML
INJECTION, EMULSION INTRAVENOUS PRN
Status: DISCONTINUED | OUTPATIENT
Start: 2022-08-24 | End: 2022-08-24 | Stop reason: SDUPTHER

## 2022-08-24 RX ORDER — OXYCODONE HYDROCHLORIDE AND ACETAMINOPHEN 5; 325 MG/1; MG/1
2 TABLET ORAL
Status: DISCONTINUED | OUTPATIENT
Start: 2022-08-24 | End: 2022-08-24 | Stop reason: HOSPADM

## 2022-08-24 RX ORDER — BUPIVACAINE HYDROCHLORIDE 2.5 MG/ML
INJECTION, SOLUTION INFILTRATION; PERINEURAL PRN
Status: DISCONTINUED | OUTPATIENT
Start: 2022-08-24 | End: 2022-08-24 | Stop reason: ALTCHOICE

## 2022-08-24 RX ORDER — GINSENG 100 MG
CAPSULE ORAL
Status: DISCONTINUED
Start: 2022-08-24 | End: 2022-08-24 | Stop reason: HOSPADM

## 2022-08-24 RX ORDER — SODIUM CHLORIDE 9 MG/ML
INJECTION, SOLUTION INTRAVENOUS CONTINUOUS
Status: DISCONTINUED | OUTPATIENT
Start: 2022-08-24 | End: 2022-08-24 | Stop reason: HOSPADM

## 2022-08-24 RX ORDER — DIPHENHYDRAMINE HYDROCHLORIDE 50 MG/ML
12.5 INJECTION INTRAMUSCULAR; INTRAVENOUS
Status: DISCONTINUED | OUTPATIENT
Start: 2022-08-24 | End: 2022-08-24 | Stop reason: HOSPADM

## 2022-08-24 RX ORDER — OXYCODONE HYDROCHLORIDE AND ACETAMINOPHEN 5; 325 MG/1; MG/1
1 TABLET ORAL
Status: DISCONTINUED | OUTPATIENT
Start: 2022-08-24 | End: 2022-08-24 | Stop reason: HOSPADM

## 2022-08-24 RX ORDER — PROPOFOL 10 MG/ML
INJECTION, EMULSION INTRAVENOUS
Status: COMPLETED
Start: 2022-08-24 | End: 2022-08-24

## 2022-08-24 RX ORDER — SCOLOPAMINE TRANSDERMAL SYSTEM 1 MG/1
1 PATCH, EXTENDED RELEASE TRANSDERMAL ONCE
Status: DISCONTINUED | OUTPATIENT
Start: 2022-08-24 | End: 2022-08-24 | Stop reason: HOSPADM

## 2022-08-24 RX ORDER — SCOLOPAMINE TRANSDERMAL SYSTEM 1 MG/1
PATCH, EXTENDED RELEASE TRANSDERMAL
Status: DISCONTINUED
Start: 2022-08-24 | End: 2022-08-24 | Stop reason: HOSPADM

## 2022-08-24 RX ORDER — ONDANSETRON 2 MG/ML
INJECTION INTRAMUSCULAR; INTRAVENOUS PRN
Status: DISCONTINUED | OUTPATIENT
Start: 2022-08-24 | End: 2022-08-24 | Stop reason: SDUPTHER

## 2022-08-24 RX ORDER — CEFAZOLIN 2 G/1
INJECTION, POWDER, FOR SOLUTION INTRAMUSCULAR; INTRAVENOUS
Status: DISCONTINUED
Start: 2022-08-24 | End: 2022-08-24 | Stop reason: HOSPADM

## 2022-08-24 RX ORDER — SODIUM CHLORIDE 0.9 % (FLUSH) 0.9 %
5-40 SYRINGE (ML) INJECTION PRN
Status: DISCONTINUED | OUTPATIENT
Start: 2022-08-24 | End: 2022-08-24 | Stop reason: HOSPADM

## 2022-08-24 RX ORDER — APREPITANT 40 MG/1
CAPSULE ORAL
Status: DISCONTINUED
Start: 2022-08-24 | End: 2022-08-24 | Stop reason: HOSPADM

## 2022-08-24 RX ORDER — CEFAZOLIN SODIUM 1 G/50ML
INJECTION, SOLUTION INTRAVENOUS PRN
Status: DISCONTINUED | OUTPATIENT
Start: 2022-08-24 | End: 2022-08-24 | Stop reason: SDUPTHER

## 2022-08-24 RX ORDER — SODIUM CHLORIDE 0.9 % (FLUSH) 0.9 %
5-40 SYRINGE (ML) INJECTION EVERY 12 HOURS SCHEDULED
Status: DISCONTINUED | OUTPATIENT
Start: 2022-08-24 | End: 2022-08-24 | Stop reason: HOSPADM

## 2022-08-24 RX ORDER — LIDOCAINE HYDROCHLORIDE 10 MG/ML
INJECTION, SOLUTION EPIDURAL; INFILTRATION; INTRACAUDAL; PERINEURAL PRN
Status: DISCONTINUED | OUTPATIENT
Start: 2022-08-24 | End: 2022-08-24 | Stop reason: SDUPTHER

## 2022-08-24 RX ORDER — ONDANSETRON 2 MG/ML
4 INJECTION INTRAMUSCULAR; INTRAVENOUS
Status: DISCONTINUED | OUTPATIENT
Start: 2022-08-24 | End: 2022-08-24 | Stop reason: HOSPADM

## 2022-08-24 RX ORDER — MIDAZOLAM HYDROCHLORIDE 1 MG/ML
INJECTION INTRAMUSCULAR; INTRAVENOUS PRN
Status: DISCONTINUED | OUTPATIENT
Start: 2022-08-24 | End: 2022-08-24 | Stop reason: SDUPTHER

## 2022-08-24 RX ORDER — APREPITANT 40 MG/1
40 CAPSULE ORAL ONCE
Status: COMPLETED | OUTPATIENT
Start: 2022-08-24 | End: 2022-08-24

## 2022-08-24 RX ORDER — IPRATROPIUM BROMIDE AND ALBUTEROL SULFATE 2.5; .5 MG/3ML; MG/3ML
1 SOLUTION RESPIRATORY (INHALATION)
Status: DISCONTINUED | OUTPATIENT
Start: 2022-08-24 | End: 2022-08-24 | Stop reason: HOSPADM

## 2022-08-24 RX ORDER — SODIUM CHLORIDE 9 MG/ML
25 INJECTION, SOLUTION INTRAVENOUS PRN
Status: DISCONTINUED | OUTPATIENT
Start: 2022-08-24 | End: 2022-08-24 | Stop reason: HOSPADM

## 2022-08-24 RX ORDER — HYDROCODONE BITARTRATE AND ACETAMINOPHEN 5; 325 MG/1; MG/1
1 TABLET ORAL EVERY 6 HOURS PRN
Qty: 20 TABLET | Refills: 0 | Status: SHIPPED | OUTPATIENT
Start: 2022-08-24 | End: 2022-08-29

## 2022-08-24 RX ORDER — CEPHALEXIN 500 MG/1
500 CAPSULE ORAL 3 TIMES DAILY
Qty: 21 CAPSULE | Refills: 0 | Status: SHIPPED | OUTPATIENT
Start: 2022-08-24 | End: 2022-08-31

## 2022-08-24 RX ORDER — BUPIVACAINE HYDROCHLORIDE 2.5 MG/ML
INJECTION, SOLUTION EPIDURAL; INFILTRATION; INTRACAUDAL
Status: DISCONTINUED
Start: 2022-08-24 | End: 2022-08-24 | Stop reason: HOSPADM

## 2022-08-24 RX ORDER — SODIUM CHLORIDE 9 MG/ML
INJECTION, SOLUTION INTRAVENOUS PRN
Status: DISCONTINUED | OUTPATIENT
Start: 2022-08-24 | End: 2022-08-24 | Stop reason: HOSPADM

## 2022-08-24 RX ORDER — LABETALOL HYDROCHLORIDE 5 MG/ML
10 INJECTION, SOLUTION INTRAVENOUS
Status: DISCONTINUED | OUTPATIENT
Start: 2022-08-24 | End: 2022-08-24 | Stop reason: HOSPADM

## 2022-08-24 RX ORDER — DEXAMETHASONE SODIUM PHOSPHATE 10 MG/ML
INJECTION, SOLUTION INTRAMUSCULAR; INTRAVENOUS PRN
Status: DISCONTINUED | OUTPATIENT
Start: 2022-08-24 | End: 2022-08-24 | Stop reason: SDUPTHER

## 2022-08-24 RX ORDER — PROMETHAZINE HYDROCHLORIDE 25 MG/ML
6.25 INJECTION, SOLUTION INTRAMUSCULAR; INTRAVENOUS EVERY 5 MIN PRN
Status: DISCONTINUED | OUTPATIENT
Start: 2022-08-24 | End: 2022-08-24 | Stop reason: HOSPADM

## 2022-08-24 RX ORDER — GINSENG 100 MG
CAPSULE ORAL PRN
Status: DISCONTINUED | OUTPATIENT
Start: 2022-08-24 | End: 2022-08-24 | Stop reason: ALTCHOICE

## 2022-08-24 RX ORDER — SODIUM CHLORIDE, SODIUM LACTATE, POTASSIUM CHLORIDE, CALCIUM CHLORIDE 600; 310; 30; 20 MG/100ML; MG/100ML; MG/100ML; MG/100ML
INJECTION, SOLUTION INTRAVENOUS CONTINUOUS
Status: DISCONTINUED | OUTPATIENT
Start: 2022-08-24 | End: 2022-08-24 | Stop reason: HOSPADM

## 2022-08-24 RX ORDER — FENTANYL CITRATE 50 UG/ML
INJECTION, SOLUTION INTRAMUSCULAR; INTRAVENOUS PRN
Status: DISCONTINUED | OUTPATIENT
Start: 2022-08-24 | End: 2022-08-24 | Stop reason: SDUPTHER

## 2022-08-24 RX ORDER — FAMOTIDINE 10 MG/ML
INJECTION, SOLUTION INTRAVENOUS
Status: COMPLETED
Start: 2022-08-24 | End: 2022-08-24

## 2022-08-24 RX ADMIN — FENTANYL CITRATE 25 MCG: 50 INJECTION, SOLUTION INTRAMUSCULAR; INTRAVENOUS at 12:22

## 2022-08-24 RX ADMIN — FENTANYL CITRATE 50 MCG: 50 INJECTION, SOLUTION INTRAMUSCULAR; INTRAVENOUS at 12:00

## 2022-08-24 RX ADMIN — CEFAZOLIN SODIUM 2 G: 1 INJECTION, SOLUTION INTRAVENOUS at 12:08

## 2022-08-24 RX ADMIN — LIDOCAINE HYDROCHLORIDE 50 MG: 10 INJECTION, SOLUTION EPIDURAL; INFILTRATION; INTRACAUDAL; PERINEURAL at 12:02

## 2022-08-24 RX ADMIN — MIDAZOLAM HYDROCHLORIDE 1 MG: 1 INJECTION, SOLUTION INTRAMUSCULAR; INTRAVENOUS at 11:59

## 2022-08-24 RX ADMIN — SODIUM CHLORIDE, POTASSIUM CHLORIDE, SODIUM LACTATE AND CALCIUM CHLORIDE: 600; 310; 30; 20 INJECTION, SOLUTION INTRAVENOUS at 10:40

## 2022-08-24 RX ADMIN — Medication 0.5 MG: at 13:12

## 2022-08-24 RX ADMIN — APREPITANT 40 MG: 40 CAPSULE ORAL at 11:18

## 2022-08-24 RX ADMIN — ONDANSETRON 4 MG: 2 INJECTION INTRAMUSCULAR; INTRAVENOUS at 12:08

## 2022-08-24 RX ADMIN — PROPOFOL 100 MG: 10 INJECTION, EMULSION INTRAVENOUS at 12:02

## 2022-08-24 RX ADMIN — FAMOTIDINE 20 MG: 10 INJECTION, SOLUTION INTRAVENOUS at 11:19

## 2022-08-24 RX ADMIN — DEXAMETHASONE SODIUM PHOSPHATE 5 MG: 10 INJECTION INTRAMUSCULAR; INTRAVENOUS at 12:08

## 2022-08-24 RX ADMIN — FENTANYL CITRATE 50 MCG: 50 INJECTION, SOLUTION INTRAMUSCULAR; INTRAVENOUS at 12:02

## 2022-08-24 RX ADMIN — HYDROMORPHONE HYDROCHLORIDE 0.5 MG: 1 INJECTION, SOLUTION INTRAMUSCULAR; INTRAVENOUS; SUBCUTANEOUS at 13:12

## 2022-08-24 ASSESSMENT — PAIN DESCRIPTION - DESCRIPTORS
DESCRIPTORS: THROBBING
DESCRIPTORS: SHARP

## 2022-08-24 ASSESSMENT — PAIN SCALES - GENERAL
PAINLEVEL_OUTOF10: 7
PAINLEVEL_OUTOF10: 0
PAINLEVEL_OUTOF10: 5
PAINLEVEL_OUTOF10: 0

## 2022-08-24 ASSESSMENT — PAIN - FUNCTIONAL ASSESSMENT: PAIN_FUNCTIONAL_ASSESSMENT: 0-10

## 2022-08-24 ASSESSMENT — PAIN DESCRIPTION - LOCATION: LOCATION: HAND

## 2022-08-24 ASSESSMENT — PAIN DESCRIPTION - ORIENTATION: ORIENTATION: UPPER

## 2022-08-24 NOTE — ANESTHESIA POSTPROCEDURE EVALUATION
Department of Anesthesiology  Postprocedure Note    Patient: Trudy Tobar  MRN: [id number]  YOB: 1950  Date of evaluation: 8/24/2022      Procedure Summary     Date: 08/24/22 Room / Location: [facility] 02 / [address] [place] [address]    Anesthesia Start: 1200 Anesthesia Stop: 1245    Procedure: CLOSED REDUCTION PINNING LEFT LITTLE FINGER (Left: Little Finger) Diagnosis:       Other closed fracture of fifth metacarpal bone of left hand, initial encounter      (Other closed fracture of fifth metacarpal bone of left hand, initial encounter Stephanie Justice)    Surgeons: Krystian Gomez MD Responsible Provider: Aliza Hunt MD    Anesthesia Type: general ASA Status: 4          Anesthesia Type: No value filed.     Elver Phase I: Elver Score: 10    Elver Phase II: Elver Score: 10      Anesthesia Post Evaluation    Patient location during evaluation: PACU  Patient participation: complete - patient participated  Level of consciousness: awake and alert  Airway patency: patent  Nausea & Vomiting: no nausea and no vomiting  Complications: no  Cardiovascular status: hemodynamically stable  Respiratory status: room air and spontaneous ventilation  Hydration status: euvolemic  Multimodal analgesia pain management approach

## 2022-08-24 NOTE — H&P
Office Note     JACQUE Whitaker MD, FACS     Subjective:      Patient ID: Juanita Hurst is a 71 y.o. female. HPI  Patient comes in today after a fall where she sustained a left metacarpal neck fracture with displacement and impaction. Date of injury was approximately 2 weeks ago. Patient had x-rays which show the displaced metacarpal neck fracture. She now comes in for consultation and treatment options with a delayed presentation. Review of Systems     Past Medical History        Past Medical History:   Diagnosis Date    Allergic rhinitis, cause unspecified      Back pain       lumbar    Bowel obstruction (HCC)       history of due to scar tissue, resolved non-surgically    C. difficile diarrhea      CAD (coronary artery disease)       no stent needed per pt.  Dr. Mariela Alonso did cath at  2005    Cardiac murmur      Cellulitis       left leg    Cellulitis 2017 August     leg left leg/bug bite    Cerebral artery occlusion with cerebral infarction [facility])       TIA 2014    COVID-19       ONE YR AGO IN 4/25/2020 fever and cough    Diverticulosis of colon (without mention of hemorrhage)      GERD (gastroesophageal reflux disease)      GERD (gastroesophageal reflux disease)       on rx    History of blood transfusion       approx 2020        History of CHF (congestive heart failure)      History of MI (myocardial infarction) 2005     thought due to a blood clot    History of ovarian cyst 1970     had oopherectomy holly    History of peritonitis 1968     due to ruptured appendix age 13    HTN (hypertension)      Hx of blood clots       right leg    Hyperlipidemia      Intestinal or peritoneal adhesions with obstruction (postoperative) (postinfection) ([address]      Kidney infection       renal failure/sepsis/spider bite    Lateral epicondylitis  of elbow      MDRO (multiple drug resistant organisms) resistance       c diff    Muscle strain       right posterior shoulder    Other abnormal glucose      PONV (postoperative nausea and vomiting)       dry heaves    Pre-diabetes      Restless legs syndrome (RLS)      Snores       no cpap    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)      TIA (transient ischemic attack) 2014    Uses walker      Vitamin D deficiency      Wears glasses      Wellness examination       last seen 2 weeks ago         Past Surgical History         Past Surgical History:   Procedure Laterality Date    ABDOMEN SURGERY   1976     benign tumor removed near remaining ovary, 1.5 pounds    APPENDECTOMY   1968     appendix ruptured, developed peritonitis    BACK SURGERY        BUNIONECTOMY Left       along with calcium deposits removed    [address]   2005     negative    CERVICAL FUSION   05/21/2021     POSTERIOR C3-6 LAMINECTOMY, PARTIAL C7 LAMINECTOMY, FUSION C3-C6, SILVERCORD    CERVICAL FUSION N/A 5/21/2021     POSTERIOR C3-6 LAMINECTOMY, PARTIAL C7 LAMINECTOMY, FUSION C3-C6, SILVERCORD performed by Ellin Bamberger, DO at [address] Right       right facial    HYSTERECTOMY (CERVIX STATUS UNKNOWN)   1973     taken as a result of recurring cysts    LUMBAR FUSION N/A 02/10/2020     LUMBAR L4-5 POSTERIOR  DECOMPRESSION INSTRUMENTATION FUSION WCEMENT AUGMENTATION/ performed by Wicho Andrade MD at [facility] N/A 06/17/2020     L5-S1 PLIF L4-L5 REVISION performed by Wicho Andrade MD at [address] [place] [address]   08/14/2014     [facility]     UNILATERAL due to cyst    OVARY REMOVAL   1971     partial, due to cyst    SINUS SURGERY   2004    UPPER GASTROINTESTINAL ENDOSCOPY N/A 05/31/2019     EGD ESOPHAGOGASTRODUODENOSCOPY performed by Richard Marinelli MD at [address] [place] [address] N/A 08/05/2019     EGD BIOPSY performed by Manuel Munoz MD at [address] [place] [address] N/A 08/23/2019 EGD BIOPSY performed by Keri Isaac MD at [address] Left 03/05/2019     WRIST OPEN REDUCTION INTERNAL FIXATION performed by Torrie Staples MD at [address]   Allergen Reactions    Bactrim [Sulfamethoxazole-Trimethoprim] Other (See Comments)       confusion    Codeine Itching    Diazepam Other (See Comments)    Meperidine Hcl Other (See Comments)    Seasonal        Current Facility-Administered Medications          Current Outpatient Medications   Medication Sig Dispense Refill    apixaban (ELIQUIS) 5 MG TABS tablet Take 1 tablet by mouth 2 times daily 60 tablet 0    pantoprazole (PROTONIX) 40 MG tablet Take 1 tablet by mouth every morning (before breakfast) 30 tablet 3    busPIRone (BUSPAR) 5 MG tablet Take 1 tablet by mouth 3 times daily 90 tablet 0    pramipexole (MIRAPEX) 0.5 MG tablet Take 1 tablet by mouth nightly 30 tablet 0    furosemide (LASIX) 20 MG tablet Take 1 tablet by mouth daily 30 tablet 0    citalopram (CELEXA) 20 MG tablet Take 1 tablet by mouth daily 30 tablet 0    fenofibrate micronized (LOFIBRA) 134 MG capsule Take 1 capsule by mouth every morning (before breakfast) 90 capsule 2    albuterol-ipratropium (COMBIVENT RESPIMAT)  MCG/ACT AERS inhaler Inhale 1 puff into the lungs every 6 hours as needed for Wheezing or Shortness of Breath 4 g 0    atorvastatin (LIPITOR) 40 MG tablet Take 1 tablet by mouth nightly 30 tablet 0    carvedilol (COREG) 12.5 MG tablet Take 1 tablet by mouth 2 times daily 60 tablet 0    acetaminophen (TYLENOL) 325 MG tablet Take 2 tablets by mouth every 4 hours as needed for Pain        melatonin 3 MG TABS tablet Take 2 tablets by mouth nightly as needed (insomnia)        nitroGLYCERIN (NITROSTAT) 0.4 MG SL tablet Place 1 tablet under the tongue every 5 minutes as needed for Chest pain 75 tablet 3    cyanocobalamin ([employer] VITAMIN B12) 1000 MCG tablet Take 1 tablet by mouth daily (Patient taking differently: Take 1,000 mcg by mouth at bedtime) 30 tablet 3    VITAMIN D, ERGOCALCIFEROL, PO Take by mouth nightly        Calcium Carbonate-Vitamin D 500-125 MG-UNIT TABS Take 1 tablet by mouth nightly         gabapentin (NEURONTIN) 100 MG capsule Take 2 capsules by mouth 2 times daily for 30 days. 120 capsule 0    traZODone (DESYREL) 50 MG tablet TAKE 1 TABLET BY MOUTH EVERY NIGHT AS NEEDED FOR SLEEP (Patient not taking: Reported on 2022) 30 tablet 0    Skin Protectants, Misc. (HYDROCERIN) CREA cream Apply topically 2 times daily (Patient not taking: Reported on 2022) 1 each 0    ferrous sulfate (IRON 325) 325 (65 Fe) MG tablet Take 1 tablet by mouth 2 times daily (Patient not taking: Reported on 2022) 90 tablet 2    Lancets MISC 1 each by Does not apply route daily 100 each 3    blood glucose monitor strips Test 2 times a day & as needed for symptoms of irregular blood glucose. 100 strip 5      No current facility-administered medications for this visit.          Social History               Socioeconomic History    Marital status:        Spouse name: Not on file    Number of children: Not on file    Years of education: Not on file    Highest education level: Not on file   Occupational History    Occupation: retired   Tobacco Use    Smoking status: Former       Packs/day: 0.50       Years: 20.00       Pack years: 10.00       Types: Cigarettes       Start date: 1995       Quit date: 2017       Years since quittin.1    Smokeless tobacco: Never   Vaping Use    Vaping Use: Never used   Substance and Sexual Activity    Alcohol use: No       Alcohol/week: 0.0 standard drinks    Drug use: No    Sexual activity: Not on file   Other Topics Concern    Not on file   Social History Narrative    Not on file      Social Determinants of Health          Financial Resource Strain: Not on file   Food Insecurity: No Food Insecurity    Worried About [address] in the Last Year: Never true    Ran Out of Food in the Last Year: Never true   Transportation Needs: No Transportation Needs    Lack of Transportation (Medical): No    Lack of Transportation (Non-Medical): No   Physical Activity: Inactive    Days of Exercise per Week: 0 days    Minutes of Exercise per Session: 0 min   Stress: Not on file   Social Connections: Not on file   Intimate Partner Violence: Not on file   Housing Stability: Low Risk     Unable to Pay for Housing in the Last Year: No    Number of Places Lived in the Last Year: 1    Unstable Housing in the Last Year: No         Family History         Family History   Problem Relation Age of Onset    Stroke Mother      Diabetes Mother      Heart Disease Mother      High Blood Pressure Mother      Heart Disease Father      Heart Disease Brother      High Blood Pressure Brother      Heart Disease Maternal Grandmother      High Blood Pressure Sister           Review of systems is otherwise negative. Resp 20   Ht 5' 3\" (1.6 m)   Wt 180 lb (81.6 kg)   BMI 31.89 kg/m²         Objective:   Physical Exam  Vitals and nursing note reviewed. Exam conducted with a chaperone present. Constitutional:       Appearance: Normal appearance. She is well-developed. She is not diaphoretic. HENT:      Head: Normocephalic and atraumatic. Nose: Nose normal.   Eyes:      Extraocular Movements: Extraocular movements intact. Conjunctiva/sclera: Conjunctivae normal.      Pupils: Pupils are equal, round, and reactive to light. Neck:      Vascular: No JVD. Trachea: No tracheal deviation. Cardiovascular:      Rate and Rhythm: Normal rate. Pulmonary:      Effort: Pulmonary effort is normal. No respiratory distress. Breath sounds: No wheezing. Abdominal:      General: There is no distension. Palpations: Abdomen is soft. Musculoskeletal:         General: No tenderness. Cervical back: Normal range of motion.       Comments: Patient has her bilateral lower extremities wrapped with Ace wraps due to cellulitis. She comes in on a wheelchair and has difficulty with ambulation. Her left hand is in a Velcro splint. She has tenderness with decreased range of motion over the metacarpal head of the left fifth metacarpal.  There is no ecchymosis at this time   Lymphadenopathy:      Cervical: No cervical adenopathy. Skin:     General: Skin is warm and dry. Coloration: Skin is not pale. Findings: No erythema or rash. Nails: There is no clubbing. Comments: Patient states that she has cellulitis on her legs bilaterally and has them currently wrapped from the knee all the way to the toes   Neurological:      Mental Status: She is alert and oriented to person, place, and time. Cranial Nerves: No cranial nerve deficit. Psychiatric:         Mood and Affect: Mood normal.         Speech: Speech normal.         Behavior: Behavior normal.         Thought Content: Thought content normal.         Judgment: Judgment normal.         The patient's external medical records and x-rays were independently reviewed by me. Assessment:        Diagnosis Orders   1. Closed displaced fracture of other part of fifth metacarpal bone of left hand, initial encounter                           Plan:   I do believe she would benefit from close reduction percutaneous pinning of her left fifth metacarpal fracture. There are some delayed presentation which may require us to open the fracture if we cannot get it to reduce adequately. She understands the risk involved including but not limited to the risk of infection, bleeding, scar formation, delayed wound healing, malunion, nonunion, need for reoperation, stiffness need for therapy and wishes to proceed. The patient was evaluated and examined with my nurse in the room at all times.   Portions of this note were transcribed using Dragon voice recognition technology and as such may reflect some variations in voice

## 2022-08-24 NOTE — OP NOTE
Operative Note      Patient: Lyn Hinton  YOB: 1950  MRN: [id number]    Date of Procedure: 8/24/2022    Pre-Op Diagnosis: Other closed fracture of fifth metacarpal bone of left hand, initial encounter [S62.397A]    Post-Op Diagnosis: Same       Procedure(s):  CLOSED REDUCTION PINNING LEFT LITTLE FINGER    Surgeon(s): Jessi Velez MD    Assistant:   * No surgical staff found *    Anesthesia: General    Estimated Blood Loss (mL): Minimal    Complications: None    Specimens:   * No specimens in log *    Implants:  Implant Name Type Inv. Item Serial No.  Lot No. LRB No. Used Action   K WIRE FIX L6IN DIA0.045IN [id number]] [employer]] - [id number]  K WIRE FIX L6IN DIA0.045IN [id number]] [employer]]  [facility] [employer] [id number] Left 2 Implanted         Drains: * No LDAs found *    Findings: Good reduction of left fifth metacarpal fracture. Detailed Description of Procedure: The patient brought in the operating room, laid in supine position, placed under general anesthesia. Her left arm was prepped and draped in sterile fashion. Fluoroscopy confirmed the fracture on the left fifth metacarpal head. A close reduction was performed but I was unable to adequately reduce the fracture fragments. A reduction forcep was then used percutaneously to realign the fracture fragments into good anatomic alignment. 2x0.045 K wires were placed in a crisscross fashion to hold the fracture fragments in place. Good reduction and good rigid fixation was confirmed fluoroscopy. 0.25% Marcaine plain was injected for postoperative pain control. A volar splint was placed. The patient tolerated the procedure well was taken to postop recovery in stable condition.     Electronically signed by Jessi Velez MD on 8/24/2022 at 12:48 PM

## 2022-08-24 NOTE — CARE COORDINATION
My [facility] ([facility]) Melissa,diabetes zone tools,  walk-in clinic and right care-right place-right time information sheets was mailed out to pt.

## 2022-08-24 NOTE — ANESTHESIA PRE PROCEDURE
Department of Anesthesiology  Preprocedure Note       Name:  Lisa Bhatt   Age:  71 y.o.  :  1950                                          MRN:  [id number]         Date:  2022      Surgeon: Zara Coronel): Mary Bond MD    Procedure: Procedure(s):  LEFT LITTLE FINGER CLOSED REDUCTION PINNING    Medications prior to admission:   Prior to Admission medications    Medication Sig Start Date End Date Taking? Authorizing Provider   gabapentin (NEURONTIN) 100 MG capsule Take 2 capsules by mouth 2 times daily for 30 days. Patient not taking: Reported on 2022  Azeb Wilson MD   apixaban (ELIQUIS) 5 MG TABS tablet Take 1 tablet by mouth 2 times daily 22   Azeb Wilson MD   traZODone (DESYREL) 50 MG tablet TAKE 1 TABLET BY MOUTH EVERY NIGHT AS NEEDED FOR SLEEP  Patient not taking: No sig reported 22   Azeb Wilson MD   Skin Protectants, Misc.  (HYDROCERIN) CREA cream Apply topically 2 times daily  Patient not taking: No sig reported 22   Marcia Vail MD   pantoprazole (PROTONIX) 40 MG tablet Take 1 tablet by mouth every morning (before breakfast) 22   Marcia Vail MD   busPIRone (BUSPAR) 5 MG tablet Take 1 tablet by mouth 3 times daily 22   Sudha Villalta MD   pramipexole (MIRAPEX) 0.5 MG tablet Take 1 tablet by mouth nightly 22   Sudha Villalta MD   furosemide (LASIX) 20 MG tablet Take 1 tablet by mouth daily 22   Sduha Villalta MD   citalopram (CELEXA) 20 MG tablet Take 1 tablet by mouth daily 22   Sudha Villalta MD   fenofibrate micronized (LOFIBRA) 134 MG capsule Take 1 capsule by mouth every morning (before breakfast) 22   Sudha Villalta MD   albuterol-ipratropium (COMBIVENT RESPIMAT)  MCG/ACT AERS inhaler Inhale 1 puff into the lungs every 6 hours as needed for Wheezing or Shortness of Breath 4/10/22   Kian Lew MD   atorvastatin (LIPITOR) 40 MG tablet Take 1 tablet by mouth nightly 4/10/22   Grant Ramos Carmelita Becker MD   carvedilol (COREG) 12.5 MG tablet Take 1 tablet by mouth 2 times daily 4/10/22   Libra López MD   amLODIPine [facility]) 5 MG tablet Take 1 tablet by mouth daily 4/10/22 7/21/22  Libra López MD   acetaminophen (TYLENOL) 325 MG tablet Take 2 tablets by mouth every 4 hours as needed for Pain 4/7/22   Libra López MD   melatonin 3 MG TABS tablet Take 2 tablets by mouth nightly as needed (insomnia) 4/7/22   Libra López MD   nitroGLYCERIN (NITROSTAT) 0.4 MG SL tablet Place 1 tablet under the tongue every 5 minutes as needed for Chest pain 9/1/21   Didier Alatorre MD   cyanocobalamin ([employer] VITAMIN B12) 1000 MCG tablet Take 1 tablet by mouth daily  Patient taking differently: Take 1,000 mcg by mouth at bedtime 12/3/20   Didier Alatorre MD   ferrous sulfate (IRON 325) 325 (65 Fe) MG tablet Take 1 tablet by mouth 2 times daily 7/2/20   Rosy Esquivel MD   VITAMIN D, ERGOCALCIFEROL, PO Take by mouth nightly    Historical Provider, MD   Lancets MISC 1 each by Does not apply route daily 10/10/19   Bashir Medina MD   blood glucose monitor strips Test 2 times a day & as needed for symptoms of irregular blood glucose.  10/10/19   Bashir Medina MD   Calcium Carbonate-Vitamin D 500-125 MG-UNIT TABS Take 1 tablet by mouth nightly     Historical Provider, MD       Current medications:    Current Facility-Administered Medications   Medication Dose Route Frequency Provider Last Rate Last Admin    0.9 % sodium chloride infusion   IntraVENous Continuous Sunil Resendiz MD        lactated ringers infusion   IntraVENous Continuous Sunil Resendiz  mL/hr at 08/24/22 1040 New Bag at 08/24/22 1040    sodium chloride flush 0.9 % injection 5-40 mL  5-40 mL IntraVENous 2 times per day Sunil Resendiz MD        sodium chloride flush 0.9 % injection 5-40 mL  5-40 mL IntraVENous PRN Sunil Resendiz MD        0.9 % sodium chloride infusion   IntraVENous PRN Sunil Resendiz MD        ceFAZolin (ANCEF) 2 g injection             propofol 200 MG/20ML injection             bupivacaine (PF) (MARCAINE) 0.25 % injection                Allergies: Allergies   Allergen Reactions    Bactrim [Sulfamethoxazole-Trimethoprim] Other (See Comments)     confusion    Codeine Itching    Diazepam Other (See Comments)    Meperidine Hcl Other (See Comments)    Seasonal        Problem List:    Patient Active Problem List   Diagnosis Code    Other specified disorders of rotator cuff syndrome of shoulder and allied disorders M75.100    Diverticulosis of large intestine K57.30    Intestinal or peritoneal adhesions with obstruction (postoperative) (postinfection) ([facility] [address] K56.50    Restless legs syndrome (RLS) G25.81    GERD (gastroesophageal reflux disease) K21.9    Essential hypertension I10    Mixed hyperlipidemia E78.2    Other abnormal glucose R73.09    Atherosclerosis I70.90    Allergic rhinitis J30.9    Vitamin D deficiency E55.9    Depression F32. A    Degenerative joint disease (DJD) of hip M16.9    Peripheral edema R60.9    Injury of foot, left W08.875P    Facial droop R29.810    CV[facility] vascular accident) [facility] I63.9    Bradycardia R00.1    Ataxia R27.0    Aphasia R47.01    TIA (transient ischemic attack) G45.9    Need for prophylactic vaccination and inoculation against cholera alone Z23    Osteopenia M85.80    Lumbago M54.50    Meralgia paresthetica of right side G57.11    Lumbar degenerative disc disease M51.36    Spondylosis of lumbar region without myelopathy or radiculopathy M47.816    Blood poisoning [id number]    Cellulitis of left lower extremity L03. 80    Encounter for medication monitoring Z51.81    Deep vein thrombosis (DVT) of right lower extremity (HCC) I82.401    Chronic deep vein thrombosis (DVT) of proximal vein of both lower extremities (HCC) I82.5Y3    Chronic diastolic heart failure (HCC) I50.32    Neurogenic claudication due to lumbar spinal stenosis M48.062    Sacroiliitis (HCC) M46.1    Stage 3 chronic kidney disease ([facility]) N18.30    Type 2 diabetes mellitus with circulatory disorder, without long-term current use of insulin ([facility]) E11.59    Chronic deep vein thrombosis (DVT) of popliteal vein of right lower extremity ([facility]) I82.531    Closed fracture of left wrist S62.102A    B12 deficiency E53.8    Iron deficiency anemia secondary to inadequate dietary iron intake D50.8    Closed head injury S09.90XA    Scalp laceration S01. 01XA    Abnormal finding on imaging R93.89    Other irritable bowel syndrome K58.8    Frequent falls R29.6    Double vision H53.2    Muscle soreness M79.10    Peptic ulcer K27.9    Melena K92.1    PUD (peptic ulcer disease) K27.9    Absolute anemia D64.9    Acute blood loss anemia D62    Acute renal failure ([facility]) N17.9    Clostridium difficile infection A49.8    Acquired spondylolisthesis M43.10    Spinal stenosis of lumbar region with neurogenic claudication M48.062    Acute deep vein thrombosis (DVT) of proximal vein of left lower extremity ([facility]) I82.4Y2    Leg swelling M79.89    Back pain M54.9    Neurological disorder G98.8    Closed unstable burst fracture of fifth lumbar vertebra with routine healing S32.052D    Slow transit constipation K59.01    Major depressive disorder, recurrent, moderate ([facility]) F33.1    Acute deep vein thrombosis (DVT) of femoral vein of left lower extremity ([facility]) I82.412    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[facility]) M48.02, G99.2    Acute deep vein thrombosis (DVT) of right femoral vein ([facility]) I82.411    S/P cervical spinal fusion Z98.1    Gait instability R26.81    Cervical myelopathy ([facility]) G95.9    Cellulitis of both lower extremities L03.115, L03. 116    Fracture of body of sternum, initial encounter for open fracture S22.22XB    Nondisplaced fracture of sternal end of left clavicle, initial encounter for closed fracture S42.018A    Erysipelas of right lower extremity A46    Closed fracture of body of sternum S22. 22XA    Calculus of gallbladder without cholecystitis without obstruction K80.20    Leukocytosis D72.829    Type 2 diabetes mellitus with stage 3 chronic kidney disease (HCC) E11.22, N18.30    Allergy to sulfa drugs Z88.2    Bilateral cellulitis of lower leg L03.116, L03.115    Lymphedema of both lower extremities I89.0       Past Medical History:        Diagnosis Date    Allergic rhinitis, cause unspecified     Back pain     lumbar    Bowel obstruction (HCC)     history of due to scar tissue, resolved non-surgically    C. difficile diarrhea     CAD (coronary artery disease)     no stent needed per pt.  Dr. Diana Marsh did cath at [facility]    Cardiac murmur     Cellulitis     left leg    Cellulitis 2017 August    leg left leg/bug bite    Cerebral artery occlusion with cerebral infarction [facility])     TIA 2014    COVID-19     ONE YR AGO IN 4/25/2020 fever and cough    Diverticulosis of colon (without mention of hemorrhage)     GERD (gastroesophageal reflux disease)     GERD (gastroesophageal reflux disease)     on rx    History of blood transfusion     approx 2020        History of CHF (congestive heart failure)     History of MI (myocardial infarction) 2005    thought due to a blood clot    History of ovarian cyst 1970    had oopherectomy holly    History of peritonitis 1968    due to ruptured appendix age 13    HTN (hypertension)     Hx of blood clots     right leg    Hyperlipidemia     Intestinal or peritoneal adhesions with obstruction (postoperative) (postinfection) ([address]     Kidney infection     renal failure/sepsis/spider bite    Lateral epicondylitis  of elbow     MDRO (multiple drug resistant organisms) resistance     c diff    Muscle strain     right posterior shoulder    Other abnormal glucose     PONV (postoperative nausea and vomiting)     dry heaves    Pre-diabetes     Restless legs syndrome (RLS)     Snores     no cpap    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)  TIA (transient ischemic attack) 2014    Uses walker     Vitamin D deficiency     Wears glasses     Wellness examination     last seen 2 weeks ago       Past Surgical History:        Procedure Laterality Date    ABDOMEN SURGERY  1976    benign tumor removed near remaining ovary, 1.5 pounds    APPENDECTOMY  1968    appendix ruptured, developed peritonitis    BACK SURGERY      BUNIONECTOMY Left     along with calcium deposits removed   [address]  2005    negative    CERVICAL FUSION  05/21/2021    POSTERIOR C3-6 LAMINECTOMY, PARTIAL C7 LAMINECTOMY, FUSION C3-C6, SILVERCORD    CERVICAL FUSION N/A 5/21/2021    POSTERIOR C3-6 LAMINECTOMY, PARTIAL C7 LAMINECTOMY, FUSION C3-C6, SILVERCORD performed by Edgar Mauricio DO at [address]    12 INCHES REMOVED D/T OBSTRUCTION    COLONOSCOPY      CYST REMOVAL Right     right facial    HYSTERECTOMY ([facility])  1973    taken as a result of recurring cysts    LUMBAR FUSION N/A 02/10/2020    LUMBAR L4-5 POSTERIOR  DECOMPRESSION INSTRUMENTATION FUSION WCEMENT AUGMENTATION/ performed by Randee Dunn MD at [place] [address] N/A 06/17/2020    L5-S1 PLIF L4-L5 REVISION performed by Randee Dunn MD at [address] [facility]  08/14/2014    [address]    UNILATERAL due to cyst    OVARY REMOVAL  1971    partial, due to cyst   [facility] SINUS SURGERY  2004    UPPER GASTROINTESTINAL ENDOSCOPY N/A 05/31/2019    EGD ESOPHAGOGASTRODUODENOSCOPY performed by Bart Prater MD at [facility] N/A 08/05/2019    EGD BIOPSY performed by Keisha Troncoso MD at [address] [facility] N/A 08/23/2019    EGD BIOPSY performed by Bart Prater MD at [address] [facility] 03/05/2019    WRIST OPEN REDUCTION INTERNAL FIXATION performed by Melba Lora MD at [address] 07/11/2022 04:55 AM    AST 30 07/11/2022 04:55 AM    ALT 15 07/11/2022 04:55 AM       POC Tests: No results for input(s): POCGLU, POCNA, POCK, POCCL, POCBUN, POCHEMO, POCHCT in the last 72 hours. Coags:   Lab Results   Component Value Date/Time    PROTIME 17.3 07/10/2022 01:27 PM    INR 1.4 07/10/2022 01:27 PM    APTT 37.7 07/10/2022 01:27 PM       HCG (If Applicable): No results found for: PREGTESTUR, PREGSERUM, HCG, HCGQUANT     ABGs: No results found for: PHART, PO2ART, WCC5URH, CBW3RLG, BEART, T4HBGORQ     Type & Screen (If Applicable):  No results found for: LABABO, LABRH    Drug/Infectious Status (If Applicable):  No results found for: HIV, HEPCAB    COVID-19 Screening (If Applicable):   Lab Results   Component Value Date/Time    COVID19 Not Detected 05/17/2021 10:30 PM    COVID19 Not Detected 06/13/2020 02:05 AM           Anesthesia Evaluation  Patient summary reviewed and Nursing notes reviewed   history of anesthetic complications: PONV. Airway: Mallampati: III  TM distance: >3 FB   Neck ROM: full  Mouth opening: > = 3 FB   Dental: normal exam         Pulmonary:normal exam    (+) COPD:  sleep apnea: on noncompliant,  asthma:                           ROS comment: -QUIT SMOKING 2016 - 50 PACK YEARS  -PRODUCTIVE COUGH  -ASTHMA - WELL CONTROLLED   Cardiovascular:    (+) hypertension:, past MI:, CAD:, CHF:,       ECG reviewed      Echocardiogram reviewed               ROS comment: -PERIPHERAL VASCULAR DISEASE - LOWER EXTREMITY EDEMA  -EKG - SR @ 95  -ECHO 2022 - EF-55%, MILD TRICUSPID REGURG     Neuro/Psych:   (+) CVA:,              ROS comment: -CVA - 4 YEARS AGO; LEFT SIDED WEAKNESS  -OSTEOPOROSIS  -SPINAL STENOSIS  -DDD  -S/P CERVICAL FUSION - POOR NECK MOBILITY GI/Hepatic/Renal:   (+) GERD:, PUD,           Endo/Other:    (+) Diabetes, .                   ROS comment: -NPO AFTER MIDNIGHT  -ALLERGIES - BACTRIM, CODEINE, DIAZEPAM, DEMEROL Abdominal:             Vascular:           ROS comment: -ANEMIA  -ANTICOAGULATED - STOPPED ELIQUIS FOR ONE WEEK. Other Findings:           Anesthesia Plan      general     ASA 4     (LMA)  Induction: intravenous. MIPS: Postoperative opioids intended. Anesthetic plan and risks discussed with patient. Plan discussed with CRNA.     Attending anesthesiologist reviewed and agrees with Jonathan Londono MD   8/24/2022

## 2022-08-25 NOTE — PROGRESS NOTES
CLINICAL PHARMACY NOTE: MEDS TO BEDS    Total # of Prescriptions Filled: 2   The following medications were delivered to the patient:  Norco 5-325  Cephalexin 500mg    Additional Documentation:,

## 2022-08-29 ENCOUNTER — CARE COORDINATION (OUTPATIENT)
Dept: CARE COORDINATION | Age: 72
End: 2022-08-29

## 2022-08-29 ENCOUNTER — TELEPHONE (OUTPATIENT)
Dept: INTERNAL MEDICINE CLINIC | Age: 72
End: 2022-08-29

## 2022-08-29 NOTE — TELEPHONE ENCOUNTER
Gretel from home care called to report that the patient fell again and did get a small skin tear. She also is reporting that since the 21st of August the patient has fallen 7 times. Patient does use a walker but has recently had hand surgery and she states that it makes her off balance when she tries and  the walker. Gretel from home care states that patient is totally dependant on her  and he is getting very stressed and doesn't feel she is safe at home right now. Patient of Dr. Luz Elena Will please advise.

## 2022-08-30 ENCOUNTER — CARE COORDINATION (OUTPATIENT)
Dept: CARE COORDINATION | Age: 72
End: 2022-08-30

## 2022-08-30 NOTE — TELEPHONE ENCOUNTER
Message sent to nichelle to verify if she would be able to help the patient find a facility and then follow with her until she was able to

## 2022-08-30 NOTE — CARE COORDINATION
JAMES spoke with patient at providers request regarding placement into a SNF due to multiple falls,  Patient would prefer [facility] on [address] and stated that [facility] is suppose [facility] but that they have not come out. NACHOM called [facility] who stated that they will need an order to admit along with an updated H&P from within 48 hours before admission. They require a therapy evaluation as well. JAMES left  for Nurse Majano at [facility] asking for a return call to [facility] to discuss. [facility] would be able to request an updated note and order from Dr Lay Sheehan who DC patient on 7.21.22 or have patient seen at providers office. Will await return call from [place].

## 2022-08-30 NOTE — TELEPHONE ENCOUNTER
Are you willing to put in order for skilled nursing? You were the last provider to see her after a hospital stay in April.

## 2022-08-31 PROBLEM — I63.9 CEREBRAL INFARCTION, UNSPECIFIED (HCC): Status: ACTIVE | Noted: 2022-07-21

## 2022-08-31 PROBLEM — I82.5Y3: Status: ACTIVE | Noted: 2022-07-21

## 2022-08-31 PROBLEM — R29.6 REPEATED FALLS: Status: ACTIVE | Noted: 2022-07-21

## 2022-08-31 PROBLEM — M85.80 OTHER SPECIFIED DISORDERS OF BONE DENSITY AND STRUCTURE, UNSPECIFIED SITE: Status: ACTIVE | Noted: 2022-07-21

## 2022-08-31 NOTE — CARE COORDINATION
JAMES spoke with Nadege Lyons, nurse at home health who stated that she has been authorized as of today for additional therapy. Looks like patient has been evaluated 1 time already. Nadege Lyons stated that this patient is not safe at home. Her spouse is not very helpful due to his own health issues. Gretel recommended speaking with Kory Farfan at [facility]. JAMES called back and person answering phone will contact Bibiana and the therapist that is scheduled with patient today. She will then fax the therapy report to PCP office marcella Espinoza and update JAMES that it was faxed. PCP office updated to watch for evaluation. [facility] and was provided Coy from OT contact information. JAMES called Shashi and received a text response to please text him. JAMES responded with a text requesting a call due to not putting any patient information into a text. Will wait to hear from Zbigniew.

## 2022-08-31 NOTE — TELEPHONE ENCOUNTER
Pt has appointment today with PT for evaluation, we are to be receiving the report either today or in morning- they will advise what type of care they believe patient needs.

## 2022-09-01 ENCOUNTER — OFFICE VISIT (OUTPATIENT)
Dept: INTERNAL MEDICINE CLINIC | Age: 72
End: 2022-09-01
Payer: MEDICARE

## 2022-09-01 VITALS
HEART RATE: 63 BPM | HEIGHT: 63 IN | WEIGHT: 180 LBS | DIASTOLIC BLOOD PRESSURE: 60 MMHG | OXYGEN SATURATION: 97 % | BODY MASS INDEX: 31.89 KG/M2 | SYSTOLIC BLOOD PRESSURE: 124 MMHG

## 2022-09-01 DIAGNOSIS — I63.429 CEREBROVASCULAR ACCIDENT (CVA) DUE TO EMBOLISM OF ANTERIOR CEREBRAL ARTERY, UNSPECIFIED BLOOD VESSEL LATERALITY (HCC): Primary | ICD-10-CM

## 2022-09-01 DIAGNOSIS — R26.81 UNSTEADY GAIT: ICD-10-CM

## 2022-09-01 DIAGNOSIS — E78.5 HYPERLIPIDEMIA, UNSPECIFIED HYPERLIPIDEMIA TYPE: ICD-10-CM

## 2022-09-01 DIAGNOSIS — I10 ESSENTIAL HYPERTENSION: ICD-10-CM

## 2022-09-01 DIAGNOSIS — E11.59 TYPE 2 DIABETES MELLITUS WITH OTHER CIRCULATORY COMPLICATION, WITHOUT LONG-TERM CURRENT USE OF INSULIN (HCC): ICD-10-CM

## 2022-09-01 DIAGNOSIS — R29.6 MULTIPLE FALLS: ICD-10-CM

## 2022-09-01 DIAGNOSIS — I82.5Y3 CHRONIC DEEP VEIN THROMBOSIS (DVT) OF PROXIMAL VEIN OF BOTH LOWER EXTREMITIES (HCC): ICD-10-CM

## 2022-09-01 DIAGNOSIS — G25.81 RLS (RESTLESS LEGS SYNDROME): ICD-10-CM

## 2022-09-01 PROCEDURE — 3044F HG A1C LEVEL LT 7.0%: CPT | Performed by: INTERNAL MEDICINE

## 2022-09-01 PROCEDURE — 99214 OFFICE O/P EST MOD 30 MIN: CPT | Performed by: INTERNAL MEDICINE

## 2022-09-01 PROCEDURE — 1123F ACP DISCUSS/DSCN MKR DOCD: CPT | Performed by: INTERNAL MEDICINE

## 2022-09-01 ASSESSMENT — SOCIAL DETERMINANTS OF HEALTH (SDOH): HOW HARD IS IT FOR YOU TO PAY FOR THE VERY BASICS LIKE FOOD, HOUSING, MEDICAL CARE, AND HEATING?: NOT HARD AT ALL

## 2022-09-01 NOTE — PROGRESS NOTES
[address] [facility] [address]  [place] [address]  Dept: [phone]  Dept Fax: [phone]     Name: Yamil Messina  : 1950           Chief Complaint   Patient presents with    Transient Ischemic Attack     Patient needs an evaluation to be admitted into a SNF       History of Present Illness:    HPI  Patient here for follow-up,  Has been falling a lot at home,  Has underlying history of CVA, type 2 diabetes with peripheral neuropathy,  History of DVT in the  Has not been able to take care of herself, multiple falls, can break large bones and can be incapacitated,  Needs skilled nursing facility  Past Medical History:    Past Medical History:   Diagnosis Date    Allergic rhinitis, cause unspecified     Back pain     lumbar    Bowel obstruction ([facility]     history of due to scar tissue, resolved non-surgically    C. difficile diarrhea     CAD (coronary artery disease)     no stent needed per pt.  Dr. Tierra Brooks did cath at      Cardiac murmur     Cellulitis     left leg    Cellulitis 2017    leg left leg/bug bite    Cerebral artery occlusion with cerebral infarction [facility])     TIA 2014    COVID-19     ONE YR AGO IN 2020 fever and cough    Diverticulosis of colon (without mention of hemorrhage)     GERD (gastroesophageal reflux disease)     GERD (gastroesophageal reflux disease)     on rx    History of blood transfusion     approx         History of CHF (congestive heart failure)     History of MI (myocardial infarction)     thought due to a blood clot    History of ovarian cyst     had oopherectomy holly    History of peritonitis     due to ruptured appendix age 13    HTN (hypertension)     Hx of blood clots     right leg    Hyperlipidemia     Intestinal or peritoneal adhesions with obstruction (postoperative) (postinfection) ([facility]     Kidney infection     renal failure/sepsis/spider bite    Lateral epicondylitis  of elbow     MDRO (multiple drug STATUS UNKNOWN)  1973    taken as a result of recurring cysts    LUMBAR FUSION N/A 02/10/2020    LUMBAR L4-5 POSTERIOR  DECOMPRESSION INSTRUMENTATION FUSION WCEMENT AUGMENTATION/ performed by Soni Lama MD at [facility] N/A 06/17/2020    L5-S1 PLIF L4-L5 REVISION performed by Soni Lama MD at [place] [address]  08/14/2014    FESS    OVARY REMOVAL  1970    UNILATERAL due to cyst    OVARY REMOVAL  1971    partial, due to cyst    SINUS SURGERY  2004    UPPER GASTROINTESTINAL ENDOSCOPY N/A 05/31/2019    EGD ESOPHAGOGASTRODUODENOSCOPY performed by Elizabeth Silva MD at [place] [address] N/A 08/05/2019    EGD BIOPSY performed by Jocelyn Gonzalez MD at [place] [address] N/A 08/23/2019    EGD BIOPSY performed by Elizabeth Silva MD at [address] Left 03/05/2019    WRIST OPEN REDUCTION INTERNAL FIXATION performed by Mendez Herndon MD at [address] [place] [address]        Medications:      Current Outpatient Medications:     apixaban (ELIQUIS) 5 MG TABS tablet, Take 1 tablet by mouth 2 times daily, Disp: 60 tablet, Rfl: 0    Skin Protectants, Misc.  (HYDROCERIN) CREA cream, Apply topically 2 times daily, Disp: 1 each, Rfl: 0    pantoprazole (PROTONIX) 40 MG tablet, Take 1 tablet by mouth every morning (before breakfast), Disp: 30 tablet, Rfl: 3    busPIRone (BUSPAR) 5 MG tablet, Take 1 tablet by mouth 3 times daily, Disp: 90 tablet, Rfl: 0    pramipexole (MIRAPEX) 0.5 MG tablet, Take 1 tablet by mouth nightly, Disp: 30 tablet, Rfl: 0    furosemide (LASIX) 20 MG tablet, Take 1 tablet by mouth daily, Disp: 30 tablet, Rfl: 0    citalopram (CELEXA) 20 MG tablet, Take 1 tablet by mouth daily, Disp: 30 tablet, Rfl: 0    fenofibrate micronized (LOFIBRA) 134 MG capsule, Take 1 capsule by mouth every morning (before breakfast), Disp: 90 capsule, Rfl: 2    albuterol-ipratropium (COMBIVENT RESPIMAT)  MCG/ACT AERS inhaler, Inhale 1 puff into the lungs every 6 hours as needed for Wheezing or Shortness of Breath, Disp: 4 g, Rfl: 0    atorvastatin (LIPITOR) 40 MG tablet, Take 1 tablet by mouth nightly, Disp: 30 tablet, Rfl: 0    carvedilol (COREG) 12.5 MG tablet, Take 1 tablet by mouth 2 times daily, Disp: 60 tablet, Rfl: 0    acetaminophen (TYLENOL) 325 MG tablet, Take 2 tablets by mouth every 4 hours as needed for Pain, Disp: , Rfl:     melatonin 3 MG TABS tablet, Take 2 tablets by mouth nightly as needed (insomnia), Disp: , Rfl:     nitroGLYCERIN (NITROSTAT) 0.4 MG SL tablet, Place 1 tablet under the tongue every 5 minutes as needed for Chest pain, Disp: 75 tablet, Rfl: 3    cyanocobalamin ([employer] VITAMIN B12) 1000 MCG tablet, Take 1 tablet by mouth daily, Disp: 30 tablet, Rfl: 3    ferrous sulfate (IRON 325) 325 (65 Fe) MG tablet, Take 1 tablet by mouth 2 times daily, Disp: 90 tablet, Rfl: 2    VITAMIN D, ERGOCALCIFEROL, PO, Take by mouth nightly, Disp: , Rfl:     Lancets MISC, 1 each by Does not apply route daily, Disp: 100 each, Rfl: 3    blood glucose monitor strips, Test 2 times a day & as needed for symptoms of irregular blood glucose., Disp: 100 strip, Rfl: 5    Calcium Carbonate-Vitamin D 500-125 MG-UNIT TABS, Take 1 tablet by mouth nightly , Disp: , Rfl:     gabapentin (NEURONTIN) 100 MG capsule, Take 2 capsules by mouth 2 times daily for 30 days. (Patient not taking: No sig reported), Disp: 120 capsule, Rfl: 0    traZODone (DESYREL) 50 MG tablet, TAKE 1 TABLET BY MOUTH EVERY NIGHT AS NEEDED FOR SLEEP (Patient not taking: No sig reported), Disp: 30 tablet, Rfl: 0    Allergies:      Bactrim [sulfamethoxazole-trimethoprim], Codeine, Diazepam, Meperidine hcl, and Seasonal    Social History:    Tobacco:    reports that she quit smoking about 5 years ago. Her smoking use included cigarettes. She started smoking about 27 years ago. She has a 10.00 pack-year smoking history.  She has never used smokeless tobacco.  Alcohol:      reports no history of alcohol use. Drug Use:  reports no history of drug use.     Family History:    Family History   Problem Relation Age of Onset    Stroke Mother     Diabetes Mother     Heart Disease Mother     High Blood Pressure Mother     Heart Disease Father     Heart Disease Brother     High Blood Pressure Brother     Heart Disease Maternal Grandmother     High Blood Pressure Sister        Review of Systems:    Positive and Negative as described in HPI    Constitutional:  negative for  fevers, chills, sweats, fatigue, and weight loss  HEENT:  negative for vision or hearing changes,   Respiratory:  negative for shortness of breath, cough, or congestion  Cardiovascular:  negative for  chest pain, palpitations  Gastrointestinal:  negative for nausea, vomiting, diarrhea, constipation, abdominal pain  Genitourinary:  negative for frequency, dysuria  Integument/Breast:  negative for rash, skin lesions  Musculoskeletal: Positive for joints aches and pains  Neurological: Generalized weakness  Behavior/Psych:  negative for depression and anxiety      Physical Exam:    Vitals:  /60   Pulse 63   Ht 5' 3\" (1.6 m)   Wt 180 lb (81.6 kg)   SpO2 97%   BMI 31.89 kg/m²     General appearance - alert, well appearing, and in no acute distress  Mental status - oriented to person, place, and time with normal affect  Head - normocephalic and atraumatic  Eyes - pupils equal and reactive, extraocular eye movements intact, conjunctiva clear  Ears - hearing appears to be intact  Nose - no drainage noted  Mouth - mucous membranes moist  Neck - supple, no carotid bruits, thyroid not palpable  Chest - clear to auscultation, normal effort  Heart - normal rate, regular rhythm, no murmurs  Abdomen - soft, nontender, nondistended, bowel sounds present all four quadrants, no masses, hepatomegaly or splenomegaly  Neurological -spontaneously moving her extremities, mobility reports as per physical therapy notes catheter chart  Extremities -therapy eval, report scanned  Skin - no gross lesions, rashes, or induration noted      Data:    Lab Results   Component Value Date/Time     07/21/2022 05:48 AM    K 4.3 07/21/2022 05:48 AM     07/21/2022 05:48 AM    CO2 25 07/21/2022 05:48 AM    BUN 18 07/21/2022 05:48 AM    CREATININE 0.67 07/21/2022 05:48 AM    GLUCOSE 102 07/21/2022 05:48 AM    PROT 6.9 07/11/2022 04:55 AM    LABALBU 3.7 07/11/2022 04:55 AM    BILITOT 0.60 07/11/2022 04:55 AM    ALKPHOS 36 07/11/2022 04:55 AM    AST 30 07/11/2022 04:55 AM    ALT 15 07/11/2022 04:55 AM     Lab Results   Component Value Date/Time    WBC 8.5 07/21/2022 05:48 AM    RBC 3.28 07/21/2022 05:48 AM    HGB 10.3 07/21/2022 05:48 AM    HCT 30.6 07/21/2022 05:48 AM    MCV 93.3 07/21/2022 05:48 AM    MCH 31.3 07/21/2022 05:48 AM    MCHC 33.6 07/21/2022 05:48 AM    RDW 13.4 07/21/2022 05:48 AM     07/21/2022 05:48 AM    MPV 6.4 07/21/2022 05:48 AM     Lab Results   Component Value Date/Time    TSH 1.43 05/20/2019 07:48 AM     Lab Results   Component Value Date/Time    CHOL 103 05/20/2019 07:48 AM    HDL 74 03/12/2021 02:19 PM    LABA1C 5.1 02/01/2022 10:20 AM          Assessment & Plan:     Diagnosis Orders   1. Cerebrovascular accident (CVA) due to embolism of anterior cerebral artery, unspecified blood vessel laterality [facility])  External Referral To Home Health      2. Type 2 diabetes mellitus with other circulatory complication, without long-term current use of insulin (HCC)  Microalbumin, Ur    External Referral To Home Health      3. Chronic deep vein thrombosis (DVT) of proximal vein of both lower extremities [facility])  External Referral To Home Health      4. RLS (restless legs syndrome)        5. Essential hypertension        6. Hyperlipidemia, unspecified hyperlipidemia type        7. Multiple falls        8.  Unsteady gait            1. Cerebrovascular accident (CVA) due to embolism of anterior cerebral artery, unspecified blood vessel laterality [facility])  -     External Referral To Home Health  2. Type 2 diabetes mellitus with other circulatory complication, without long-term current use of insulin (HCC)  -     Microalbumin, Ur; Future  -     External Referral To Home Health  3. Chronic deep vein thrombosis (DVT) of proximal vein of both lower extremities ([facility]  -     External Referral To Home Health  4. RLS (restless legs syndrome)  5. Essential hypertension  6. Hyperlipidemia, unspecified hyperlipidemia type  7. Multiple falls  8. Unsteady gait     Patient had physical therapy evaluation,  She unmet all the goals for daily activity, she will be benefited from skilled nursing facility,  PT notes have been scanned into the chart               DM:  Discussed in detail about the Diabetes, lab results, importance of diet/carb control, exercise, and med compliance. Medication side effects discussed in detail and has none today. Micro and Macrovascular complications discussed with patient today. Patient verbalized understanding. Hemoglobin A1C   Date Value Ref Range Status   02/01/2022 5.1 % Final   02/22/2021 5.2 % Final   03/04/2020 5.7 % Final       Lab Results   Component Value Date    LDLCHOLESTEROL 50 03/12/2021       Lab Results   Component Value Date    LABMICR 11 08/05/2015         Foot Exam completed within last 12 months? Yes   Eye Exam completed within last 12 months? Yes     Ch DVT   On eliquis  Multiple falls,   Needs SNF     Very high risk     HTN:    Discussed in detail about the BP, lab results, importance of diet, salt intake, exercise, and med compliance. Medication side effects discussed in detail and has none today. Patient verbalized understanding.      BP Readings from Last 3 Encounters:   09/01/22 124/60   08/24/22 (!) 158/81   08/23/22 (!) 178/85        Creatinine   Date Value Ref Range Status   07/21/2022 0.67 0.50 - 0.90 mg/dL Final   07/20/2022 0.69 0.50 - 0.90 mg/dL Final   07/19/2022 0.67 0.50 Jovita Singletary MD on 9/1/2022 at 2:57 PM

## 2022-09-01 NOTE — CARE COORDINATION
JAMES received call from Lefty Negron at [facility]. She reported that they will fax the therapy notes to PCP office and that they do recommend a SNF for inpatient therapy. JAMES spoke with patients spouse who reported that she has an appointment at PCP appointment today. ACM will have physician address the need for SNF and provide order if he agrees. Following information provided to PCP office:   The office note from today, order to admit (can be hand written and signed by MD on RX script paper) therapy notes and demographics will need to be faxed to [facility] at  [phone]

## 2022-09-06 ENCOUNTER — APPOINTMENT (OUTPATIENT)
Dept: GENERAL RADIOLOGY | Age: 72
DRG: 884 | End: 2022-09-06
Payer: MEDICARE

## 2022-09-06 ENCOUNTER — CARE COORDINATION (OUTPATIENT)
Dept: CARE COORDINATION | Age: 72
End: 2022-09-06

## 2022-09-06 ENCOUNTER — APPOINTMENT (OUTPATIENT)
Dept: CT IMAGING | Age: 72
DRG: 884 | End: 2022-09-06
Payer: MEDICARE

## 2022-09-06 ENCOUNTER — HOSPITAL ENCOUNTER (INPATIENT)
Age: 72
LOS: 4 days | Discharge: NURSING, REHAB OR LONG-TERM CARE | DRG: 884 | End: 2022-09-10
Attending: EMERGENCY MEDICINE | Admitting: INTERNAL MEDICINE
Payer: MEDICARE

## 2022-09-06 DIAGNOSIS — R53.1 GENERALIZED WEAKNESS: Primary | ICD-10-CM

## 2022-09-06 DIAGNOSIS — R52 PAIN AGGRAVATED BY SITTING: ICD-10-CM
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
## 2022-09-06 LAB
ABSOLUTE EOS #: 0.2 K/UL (ref 0–0.4)
ABSOLUTE LYMPH #: 1.5 K/UL (ref 1–4.8)
ABSOLUTE MONO #: 0.4 K/UL (ref 0.1–1.3)
ANION GAP SERPL CALCULATED.3IONS-SCNC: 10 MMOL/L (ref 9–17)
BASOPHILS # BLD: 1 % (ref 0–2)
BASOPHILS ABSOLUTE: 0 K/UL (ref 0–0.2)
BUN BLDV-MCNC: 25 MG/DL (ref 8–23)
CALCIUM SERPL-MCNC: 9.1 MG/DL (ref 8.6–10.4)
CHLORIDE BLD-SCNC: 104 MMOL/L (ref 98–107)
CO2: 27 MMOL/L (ref 20–31)
CREAT SERPL-MCNC: 0.72 MG/DL (ref 0.5–0.9)
EOSINOPHILS RELATIVE PERCENT: 3 % (ref 0–4)
GFR AFRICAN AMERICAN: >60 ML/MIN
GFR NON-AFRICAN AMERICAN: >60 ML/MIN
GFR SERPL CREATININE-BSD FRML MDRD: ABNORMAL ML/MIN/{1.73_M2}
GLUCOSE BLD-MCNC: 120 MG/DL (ref 70–99)
HCT VFR BLD CALC: 36.7 % (ref 36–46)
HEMOGLOBIN: 12.3 G/DL (ref 12–16)
INR BLD: 1.1
LYMPHOCYTES # BLD: 27 % (ref 24–44)
MAGNESIUM: 1.7 MG/DL (ref 1.6–2.6)
MCH RBC QN AUTO: 30.5 PG (ref 26–34)
MCHC RBC AUTO-ENTMCNC: 33.5 G/DL (ref 31–37)
MCV RBC AUTO: 91.1 FL (ref 80–100)
MONOCYTES # BLD: 6 % (ref 1–7)
PDW BLD-RTO: 14.6 % (ref 11.5–14.9)
PHOSPHORUS: 3.8 MG/DL (ref 2.6–4.5)
PLATELET # BLD: 267 K/UL (ref 150–450)
PMV BLD AUTO: 6.9 FL (ref 6–12)
POTASSIUM SERPL-SCNC: 3.6 MMOL/L (ref 3.7–5.3)
PROTHROMBIN TIME: 13.9 SEC (ref 11.8–14.6)
RBC # BLD: 4.03 M/UL (ref 4–5.2)
SEG NEUTROPHILS: 63 % (ref 36–66)
SEGMENTED NEUTROPHILS ABSOLUTE COUNT: 3.6 K/UL (ref 1.3–9.1)
SODIUM BLD-SCNC: 141 MMOL/L (ref 135–144)
TSH SERPL DL<=0.05 MIU/L-ACNC: 1.48 UIU/ML (ref 0.3–5)
WBC # BLD: 5.8 K/UL (ref 3.5–11)

## 2022-09-06 PROCEDURE — 6370000000 HC RX 637 (ALT 250 FOR IP)

## 2022-09-06 PROCEDURE — 99223 1ST HOSP IP/OBS HIGH 75: CPT | Performed by: INTERNAL MEDICINE

## 2022-09-06 PROCEDURE — 73502 X-RAY EXAM HIP UNI 2-3 VIEWS: CPT

## 2022-09-06 PROCEDURE — 6370000000 HC RX 637 (ALT 250 FOR IP): Performed by: NURSE PRACTITIONER

## 2022-09-06 PROCEDURE — 36415 COLL VENOUS BLD VENIPUNCTURE: CPT

## 2022-09-06 PROCEDURE — 85025 COMPLETE CBC W/AUTO DIFF WBC: CPT

## 2022-09-06 PROCEDURE — 6370000000 HC RX 637 (ALT 250 FOR IP): Performed by: EMERGENCY MEDICINE

## 2022-09-06 PROCEDURE — 85610 PROTHROMBIN TIME: CPT

## 2022-09-06 PROCEDURE — 70486 CT MAXILLOFACIAL W/O DYE: CPT

## 2022-09-06 PROCEDURE — 1200000000 HC SEMI PRIVATE

## 2022-09-06 PROCEDURE — 99285 EMERGENCY DEPT VISIT HI MDM: CPT

## 2022-09-06 PROCEDURE — 2580000003 HC RX 258

## 2022-09-06 PROCEDURE — 80048 BASIC METABOLIC PNL TOTAL CA: CPT

## 2022-09-06 PROCEDURE — 70450 CT HEAD/BRAIN W/O DYE: CPT

## 2022-09-06 PROCEDURE — 73080 X-RAY EXAM OF ELBOW: CPT

## 2022-09-06 PROCEDURE — 84443 ASSAY THYROID STIM HORMONE: CPT

## 2022-09-06 PROCEDURE — 83735 ASSAY OF MAGNESIUM: CPT

## 2022-09-06 PROCEDURE — 84100 ASSAY OF PHOSPHORUS: CPT

## 2022-09-06 RX ORDER — BUSPIRONE HYDROCHLORIDE 5 MG/1
5 TABLET ORAL 3 TIMES DAILY
Status: DISCONTINUED | OUTPATIENT
Start: 2022-09-06 | End: 2022-09-10 | Stop reason: HOSPADM

## 2022-09-06 RX ORDER — CITALOPRAM 20 MG/1
20 TABLET ORAL DAILY
Status: DISCONTINUED | OUTPATIENT
Start: 2022-09-06 | End: 2022-09-10 | Stop reason: HOSPADM

## 2022-09-06 RX ORDER — ATORVASTATIN CALCIUM 40 MG/1
20 TABLET, FILM COATED ORAL DAILY
Status: ON HOLD | COMMUNITY
End: 2022-09-09 | Stop reason: HOSPADM

## 2022-09-06 RX ORDER — FERROUS SULFATE 325(65) MG
325 TABLET ORAL
Status: DISCONTINUED | OUTPATIENT
Start: 2022-09-07 | End: 2022-09-10 | Stop reason: HOSPADM

## 2022-09-06 RX ORDER — HYDROCODONE BITARTRATE AND ACETAMINOPHEN 5; 325 MG/1; MG/1
1 TABLET ORAL ONCE
Status: COMPLETED | OUTPATIENT
Start: 2022-09-06 | End: 2022-09-06

## 2022-09-06 RX ORDER — FERROUS SULFATE 325(65) MG
325 TABLET ORAL 2 TIMES DAILY
Status: DISCONTINUED | OUTPATIENT
Start: 2022-09-06 | End: 2022-09-06

## 2022-09-06 RX ORDER — ACETAMINOPHEN 650 MG/1
650 SUPPOSITORY RECTAL EVERY 6 HOURS PRN
Status: DISCONTINUED | OUTPATIENT
Start: 2022-09-06 | End: 2022-09-10 | Stop reason: HOSPADM

## 2022-09-06 RX ORDER — ONDANSETRON 4 MG/1
4 TABLET, ORALLY DISINTEGRATING ORAL EVERY 8 HOURS PRN
Status: DISCONTINUED | OUTPATIENT
Start: 2022-09-06 | End: 2022-09-10 | Stop reason: HOSPADM

## 2022-09-06 RX ORDER — ATORVASTATIN CALCIUM 20 MG/1
20 TABLET, FILM COATED ORAL NIGHTLY
Status: DISCONTINUED | OUTPATIENT
Start: 2022-09-06 | End: 2022-09-10 | Stop reason: HOSPADM

## 2022-09-06 RX ORDER — FUROSEMIDE 20 MG/1
20 TABLET ORAL DAILY
Status: DISCONTINUED | OUTPATIENT
Start: 2022-09-06 | End: 2022-09-10 | Stop reason: HOSPADM

## 2022-09-06 RX ORDER — PANTOPRAZOLE SODIUM 40 MG/1
40 TABLET, DELAYED RELEASE ORAL
Status: DISCONTINUED | OUTPATIENT
Start: 2022-09-07 | End: 2022-09-10 | Stop reason: HOSPADM

## 2022-09-06 RX ORDER — ATORVASTATIN CALCIUM 40 MG/1
40 TABLET, FILM COATED ORAL NIGHTLY
Status: DISCONTINUED | OUTPATIENT
Start: 2022-09-06 | End: 2022-09-06

## 2022-09-06 RX ORDER — SODIUM CHLORIDE 9 MG/ML
INJECTION, SOLUTION INTRAVENOUS PRN
Status: DISCONTINUED | OUTPATIENT
Start: 2022-09-06 | End: 2022-09-10 | Stop reason: HOSPADM

## 2022-09-06 RX ORDER — PRAMIPEXOLE DIHYDROCHLORIDE 0.25 MG/1
0.5 TABLET ORAL NIGHTLY
Status: DISCONTINUED | OUTPATIENT
Start: 2022-09-06 | End: 2022-09-10 | Stop reason: HOSPADM

## 2022-09-06 RX ORDER — CARVEDILOL 12.5 MG/1
12.5 TABLET ORAL 2 TIMES DAILY
Status: DISCONTINUED | OUTPATIENT
Start: 2022-09-06 | End: 2022-09-09

## 2022-09-06 RX ORDER — FENOFIBRATE 160 MG/1
160 TABLET ORAL DAILY
Status: DISCONTINUED | OUTPATIENT
Start: 2022-09-06 | End: 2022-09-10 | Stop reason: HOSPADM

## 2022-09-06 RX ORDER — ACETAMINOPHEN 325 MG/1
650 TABLET ORAL EVERY 6 HOURS PRN
Status: DISCONTINUED | OUTPATIENT
Start: 2022-09-06 | End: 2022-09-10 | Stop reason: HOSPADM

## 2022-09-06 RX ORDER — ONDANSETRON 2 MG/ML
4 INJECTION INTRAMUSCULAR; INTRAVENOUS EVERY 6 HOURS PRN
Status: DISCONTINUED | OUTPATIENT
Start: 2022-09-06 | End: 2022-09-10 | Stop reason: HOSPADM

## 2022-09-06 RX ORDER — SODIUM CHLORIDE 0.9 % (FLUSH) 0.9 %
5-40 SYRINGE (ML) INJECTION EVERY 12 HOURS SCHEDULED
Status: DISCONTINUED | OUTPATIENT
Start: 2022-09-06 | End: 2022-09-10 | Stop reason: HOSPADM

## 2022-09-06 RX ORDER — POTASSIUM CHLORIDE 7.45 MG/ML
10 INJECTION INTRAVENOUS PRN
Status: DISCONTINUED | OUTPATIENT
Start: 2022-09-06 | End: 2022-09-10 | Stop reason: HOSPADM

## 2022-09-06 RX ORDER — CARVEDILOL 12.5 MG/1
12.5 TABLET ORAL ONCE
Status: COMPLETED | OUTPATIENT
Start: 2022-09-06 | End: 2022-09-06

## 2022-09-06 RX ORDER — FERROUS SULFATE 325(65) MG
325 TABLET ORAL
COMMUNITY

## 2022-09-06 RX ORDER — POLYETHYLENE GLYCOL 3350 17 G/17G
17 POWDER, FOR SOLUTION ORAL DAILY PRN
Status: DISCONTINUED | OUTPATIENT
Start: 2022-09-06 | End: 2022-09-10 | Stop reason: HOSPADM

## 2022-09-06 RX ORDER — POTASSIUM CHLORIDE 20 MEQ/1
40 TABLET, EXTENDED RELEASE ORAL PRN
Status: DISCONTINUED | OUTPATIENT
Start: 2022-09-06 | End: 2022-09-10 | Stop reason: HOSPADM

## 2022-09-06 RX ORDER — LANOLIN ALCOHOL/MO/W.PET/CERES
6 CREAM (GRAM) TOPICAL NIGHTLY PRN
Status: DISCONTINUED | OUTPATIENT
Start: 2022-09-06 | End: 2022-09-10 | Stop reason: HOSPADM

## 2022-09-06 RX ORDER — SODIUM CHLORIDE 0.9 % (FLUSH) 0.9 %
5-40 SYRINGE (ML) INJECTION PRN
Status: DISCONTINUED | OUTPATIENT
Start: 2022-09-06 | End: 2022-09-10 | Stop reason: HOSPADM

## 2022-09-06 RX ORDER — HYDROCODONE BITARTRATE AND ACETAMINOPHEN 5; 325 MG/1; MG/1
1 TABLET ORAL EVERY 8 HOURS PRN
Status: DISCONTINUED | OUTPATIENT
Start: 2022-09-06 | End: 2022-09-10 | Stop reason: HOSPADM

## 2022-09-06 RX ADMIN — CARVEDILOL 12.5 MG: 12.5 TABLET, FILM COATED ORAL at 13:28

## 2022-09-06 RX ADMIN — FENOFIBRATE 160 MG: 160 TABLET ORAL at 18:04

## 2022-09-06 RX ADMIN — CARVEDILOL 12.5 MG: 12.5 TABLET, FILM COATED ORAL at 21:05

## 2022-09-06 RX ADMIN — ACETAMINOPHEN 650 MG: 325 TABLET, FILM COATED ORAL at 21:09

## 2022-09-06 RX ADMIN — SODIUM CHLORIDE, PRESERVATIVE FREE 10 ML: 5 INJECTION INTRAVENOUS at 21:05

## 2022-09-06 RX ADMIN — FUROSEMIDE 20 MG: 20 TABLET ORAL at 18:03

## 2022-09-06 RX ADMIN — HYDROCODONE BITARTRATE AND ACETAMINOPHEN 1 TABLET: 5; 325 TABLET ORAL at 12:10

## 2022-09-06 RX ADMIN — BUSPIRONE HYDROCHLORIDE 5 MG: 5 TABLET ORAL at 21:05

## 2022-09-06 RX ADMIN — ACETAMINOPHEN 650 MG: 325 TABLET, FILM COATED ORAL at 15:32

## 2022-09-06 RX ADMIN — CITALOPRAM HYDROBROMIDE 20 MG: 20 TABLET ORAL at 18:04

## 2022-09-06 RX ADMIN — ATORVASTATIN CALCIUM 20 MG: 20 TABLET, FILM COATED ORAL at 21:05

## 2022-09-06 RX ADMIN — PRAMIPEXOLE DIHYDROCHLORIDE 0.5 MG: 0.25 TABLET ORAL at 22:02

## 2022-09-06 RX ADMIN — APIXABAN 5 MG: 5 TABLET, FILM COATED ORAL at 21:05

## 2022-09-06 RX ADMIN — HYDROCODONE BITARTRATE AND ACETAMINOPHEN 1 TABLET: 5; 325 TABLET ORAL at 22:02

## 2022-09-06 RX ADMIN — BUSPIRONE HYDROCHLORIDE 5 MG: 5 TABLET ORAL at 18:04

## 2022-09-06 ASSESSMENT — PAIN DESCRIPTION - ORIENTATION
ORIENTATION: LEFT
ORIENTATION: LEFT

## 2022-09-06 ASSESSMENT — PAIN - FUNCTIONAL ASSESSMENT: PAIN_FUNCTIONAL_ASSESSMENT: NONE - DENIES PAIN

## 2022-09-06 ASSESSMENT — PAIN SCALES - GENERAL
PAINLEVEL_OUTOF10: 6
PAINLEVEL_OUTOF10: 0
PAINLEVEL_OUTOF10: 8
PAINLEVEL_OUTOF10: 6
PAINLEVEL_OUTOF10: 0
PAINLEVEL_OUTOF10: 8

## 2022-09-06 ASSESSMENT — ENCOUNTER SYMPTOMS
SHORTNESS OF BREATH: 0
RHINORRHEA: 0
WHEEZING: 0
ABDOMINAL DISTENTION: 0
VOMITING: 0
COLOR CHANGE: 1
DIARRHEA: 0
NAUSEA: 0
SHORTNESS OF BREATH: 1
COUGH: 1
ABDOMINAL PAIN: 0
CONSTIPATION: 0

## 2022-09-06 ASSESSMENT — PAIN DESCRIPTION - DESCRIPTORS
DESCRIPTORS: ACHING
DESCRIPTORS: ACHING

## 2022-09-06 ASSESSMENT — PAIN DESCRIPTION - LOCATION
LOCATION: BACK;HAND
LOCATION: BACK;HAND

## 2022-09-06 NOTE — ED NOTES
Patient states she didn't take any of her home medications this morning. Patient states she takes Coreg for high BP.       Damaso Mota RN  09/06/22 [id number]

## 2022-09-06 NOTE — ED NOTES
Report given to Ricardo Corley RN from [facility]. Report method by phone   The following was reviewed with receiving RN:   Current vital signs:  BP (!) 151/72   Pulse 61   Temp 98.2 °F (36.8 °C) (Oral)   Resp 17   Ht 5' 3\" (1.6 m)   Wt 180 lb (81.6 kg)   SpO2 96%   BMI 31.89 kg/m²                MEWS Score: 1     Any medication or safety alerts were reviewed. Any pending diagnostics and notifications were also reviewed, as well as any safety concerns or issues, abnormal labs, abnormal imaging, and abnormal assessment findings. Questions were answered.             Navjot Anne RN  09/06/22 [id number]

## 2022-09-06 NOTE — H&P
[address] [facility].      [address] [facility]     HISTORY AND PHYSICAL EXAMINATION            Date:   9/6/2022  Patient name:  Ryder Feng  Date of admission:  9/6/2022  8:51 AM  MRN:   [id number]  Account:  [de-identified]  YOB: 1950  PCP:    Gerrianne Cranker, MD  Room:   03/03  Code Status:    Prior    Chief Complaint:     Chief Complaint   Patient presents with    Fall    Hip Pain     Right       History Obtained From:     patient    History of Present Illness: The patient is a 71 y.o. Non- / non  female with a history of stroke, CHFpEF, chronic DVT in bilateral lower limbs on Eliquis, obesity, recurrent right leg cellulitis, MI, hypertension, hyperlipidemia, restless leg syndrome, depression presents to the clinic after suffering a fall 2 days ago. The patient was ambulating around her home when she lost balance and fell down on her right elbow and right head. The patient normally uses a walker and wheelchair, however, it unclear if she was using the walker at the time of the fall. She denies any dizziness, light-headedness, and vision changes at the time of the fall. The fall was unwitnessed. She denies loosing consciousness. The patient reports several falls over the past year. She lives at home with her , however he is ill  and unable to help her at home. The patient reports right elbow bruising and pain and right periorbital/cheek bruising and pain, urinary frequency, urinary urgency, chronic cough, fatigue, and left arm weakness (chronic since her stroke). She denies chest pain, shortness of breath, cough, chest pain, abdominal pain, nausea, vomiting, constipation and diarrhea.      The patient was recently discharged from [facility] on 7/21/22 where she was admitted for management of fracture to body of sternum following a fall and right leg cellulitis. She received IV antibiotics and was discharged to an extended care facility. During her stay, the patient fell and sustained a left metacarpal neck fracture. She follows up with [facility] plastic surgeons for treatment of the fracture. The ED, the patient was vitally stable, however BP was elevated (195/66). X-ray of hip and pelvis on right showed no acute osseous abnormality. X-ray of right elbow was negative. CBC was unremarkable. CT head without contrast showed no acute intracranial abnormality. CT facial bones without contrast showed no acute intracranial abnormality. Glucose was 120. The patient was admitted for investigation of fall. Past Medical History:     Past Medical History:   Diagnosis Date    Allergic rhinitis, cause unspecified     Back pain     lumbar    Bowel obstruction (HCC)     history of due to scar tissue, resolved non-surgically    C. difficile diarrhea     CAD (coronary artery disease)     no stent needed per pt.  Dr. Jeniffer Joyner did cath at  2005    Cardiac murmur     Cellulitis     left leg    Cellulitis 2017 August    leg left leg/bug bite    Cerebral artery occlusion with cerebral infarction [facility])     TIA 2014    COVID-19     ONE YR AGO IN 4/25/2020 fever and cough    Diverticulosis of colon (without mention of hemorrhage)     GERD (gastroesophageal reflux disease)     GERD (gastroesophageal reflux disease)     on rx    History of blood transfusion     approx 2020        History of CHF (congestive heart failure)     History of MI (myocardial infarction) 2005    thought due to a blood clot    History of ovarian cyst 1970    had oopherectomy holly    History of peritonitis 1968    due to ruptured appendix age 13    HTN (hypertension)     Hx of blood clots     right leg    Hyperlipidemia     Intestinal or peritoneal adhesions with obstruction (postoperative) (postinfection) ([facility] [address]     Kidney infection     renal failure/sepsis/spider bite    Lateral epicondylitis  of elbow     MDRO (multiple drug resistant organisms) resistance     c diff    Muscle strain     right posterior shoulder    Other abnormal glucose     PONV (postoperative nausea and vomiting)     dry heaves    Pre-diabetes     Restless legs syndrome (RLS)     Snores     no cpap    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)     TIA (transient ischemic attack) 2014    Uses walker     Vitamin D deficiency     Wears glasses     Wellness examination     last seen 2 weeks ago        Past SurgicalHistory:     Past Surgical History:   Procedure Laterality Date    ABDOMEN SURGERY  1976    benign tumor removed near remaining ovary, 1.5 pounds    APPENDECTOMY  1968    appendix ruptured, developed peritonitis    BACK SURGERY      BUNIONECTOMY Left     along with calcium deposits removed    [address]  2005    negative    CERVICAL FUSION  05/21/2021    POSTERIOR C3-6 LAMINECTOMY, PARTIAL C7 LAMINECTOMY, FUSION C3-C6, SILVERCORD    CERVICAL FUSION N/A 05/21/2021    POSTERIOR C3-6 LAMINECTOMY, PARTIAL C7 LAMINECTOMY, FUSION C3-C6, SILVERCORD performed by Siva Gordon DO at  [address] D/T OBSTRUCTION    COLONOSCOPY      CYST REMOVAL Right     right facial    FINGER CLOSED REDUCTION Left 08/24/2022    LEFT LITTLE FINGER CLOSED REDUCTION PINNING (Left: Little Finger)    FINGER CLOSED REDUCTION Left 8/24/2022    CLOSED REDUCTION PINNING LEFT LITTLE FINGER performed by Krystian Gomez MD at . [address] ([facility])  1973    taken as a result of recurring cysts    LUMBAR FUSION N/A 02/10/2020    LUMBAR L4-5 POSTERIOR  DECOMPRESSION INSTRUMENTATION FUSION WCEMENT AUGMENTATION/ performed by Isabel Branham MD at [facility] N/A 06/17/2020    L5-S1 PLIF L4-L5 REVISION performed by Isabel Branham MD at [address] [place] [address]  08/14/2014    FESS    OVARY REMOVAL  1970    UNILATERAL due to cyst OVARY REMOVAL  1971    partial, due to cyst    SINUS SURGERY  2004    UPPER GASTROINTESTINAL ENDOSCOPY N/A 05/31/2019    EGD ESOPHAGOGASTRODUODENOSCOPY performed by Destiney Macdonald MD at [address] [facility] N/A 08/05/2019    EGD BIOPSY performed by Ifeoma Collado MD at [address] [place] [address] N/A 08/23/2019    EGD BIOPSY performed by Destiney Macdonald MD at [address] Left 03/05/2019    WRIST OPEN REDUCTION INTERNAL FIXATION performed by Deepthi Meraz MD at [address]        Medications Prior to Admission:        Prior to Admission medications    Medication Sig Start Date End Date Taking? Authorizing Provider   gabapentin (NEURONTIN) 100 MG capsule Take 2 capsules by mouth 2 times daily for 30 days. Patient not taking: No sig reported 8/22/22 9/21/22  Renella Boeck, MD   apixaban (ELIQUIS) 5 MG TABS tablet Take 1 tablet by mouth 2 times daily 8/17/22   Renella Boeck, MD   traZODone (DESYREL) 50 MG tablet TAKE 1 TABLET BY MOUTH EVERY NIGHT AS NEEDED FOR SLEEP  Patient not taking: No sig reported 7/30/22   Renella Boeck, MD   Skin Protectants, Misc.  (HYDROCERIN) CREA cream Apply topically 2 times daily 7/18/22   Jah Gillette MD   pantoprazole (PROTONIX) 40 MG tablet Take 1 tablet by mouth every morning (before breakfast) 7/19/22   Jah Gillette MD   busPIRone (BUSPAR) 5 MG tablet Take 1 tablet by mouth 3 times daily 7/5/22   Nemo Hi MD   pramipexole (MIRAPEX) 0.5 MG tablet Take 1 tablet by mouth nightly 7/5/22   Nemo Hi MD   furosemide (LASIX) 20 MG tablet Take 1 tablet by mouth daily 6/29/22   Nemo Hi MD   citalopram (CELEXA) 20 MG tablet Take 1 tablet by mouth daily 6/27/22   Nemo Hi MD   fenofibrate micronized (LOFIBRA) 134 MG capsule Take 1 capsule by mouth every morning (before breakfast) 5/23/22   Nemo Hi MD   albuterol-ipratropium (COMBIVENT RESPIMAT)  MCG/ACT AERS inhaler Inhale 1 puff into the lungs every 6 hours as needed for Wheezing or Shortness of Breath 4/10/22   Charles Nolasco MD   atorvastatin (LIPITOR) 40 MG tablet Take 1 tablet by mouth nightly 4/10/22   Charles Nolasco MD   carvedilol (COREG) 12.5 MG tablet Take 1 tablet by mouth 2 times daily 4/10/22   Charles Nolasco MD   amLODIPine (NORVASC) 5 MG tablet Take 1 tablet by mouth daily 4/10/22 7/21/22  Charles Nolasco MD   acetaminophen (TYLENOL) 325 MG tablet Take 2 tablets by mouth every 4 hours as needed for Pain 4/7/22   Charles Nolasco MD   melatonin 3 MG TABS tablet Take 2 tablets by mouth nightly as needed (insomnia) 4/7/22   Charles Nolasco MD   nitroGLYCERIN (NITROSTAT) 0.4 MG SL tablet Place 1 tablet under the tongue every 5 minutes as needed for Chest pain 9/1/21   Rishi Marinelli MD   cyanocobalamin ([employer] VITAMIN B12) 1000 MCG tablet Take 1 tablet by mouth daily 12/3/20   Rishi Marinelli MD   ferrous sulfate (IRON 325) 325 (65 Fe) MG tablet Take 1 tablet by mouth 2 times daily 7/2/20   Narcisa Balderas MD   VITAMIN D, ERGOCALCIFEROL, PO Take by mouth nightly    Historical Provider, MD   Lancets MISC 1 each by Does not apply route daily 10/10/19   Cuauhtemoc Nagel MD   blood glucose monitor strips Test 2 times a day & as needed for symptoms of irregular blood glucose. 10/10/19   Cuauhtemoc Nagel MD   Calcium Carbonate-Vitamin D 500-125 MG-UNIT TABS Take 1 tablet by mouth nightly     Historical Provider, MD        Allergies:     Bactrim [sulfamethoxazole-trimethoprim], Codeine, Diazepam, Meperidine hcl, and Seasonal    Social History:     Tobacco:    reports that she quit smoking about 5 years ago. Her smoking use included cigarettes. She started smoking about 27 years ago. She has a 10.00 pack-year smoking history. She has never used smokeless tobacco.  Alcohol:      reports no history of alcohol use. Drug Use:  reports no history of drug use.     Family History:     Family History   Problem Relation Age of Onset    Stroke Mother     Diabetes Mother     Heart Disease Mother     High Blood Pressure Mother     Heart Disease Father     Heart Disease Brother     High Blood Pressure Brother     Heart Disease Maternal Grandmother     High Blood Pressure Sister        Review of Systems:     Positive and Negative as described in HPI. Review of Systems   Constitutional:  Positive for fatigue. Negative for chills and fever. HENT:  Negative for congestion and rhinorrhea. Respiratory:  Positive for cough and shortness of breath. Negative for wheezing. Chronic cough, no acute increase in frequency or severity; SOB on exertion    Cardiovascular:  Negative for chest pain, palpitations and leg swelling. Gastrointestinal:  Negative for abdominal distention, abdominal pain, constipation, diarrhea, nausea and vomiting. Endocrine: Negative for cold intolerance and heat intolerance. Genitourinary:  Positive for frequency and urgency. Negative for dysuria. Musculoskeletal:  Positive for arthralgias and myalgias. Skin:  Positive for color change and wound. Negative for pallor and rash. Right elbow and right side of face bruising    Neurological:  Positive for headaches. Negative for dizziness, syncope, weakness and numbness. Hematological:  Bruises/bleeds easily. Psychiatric/Behavioral:  Negative for agitation and confusion. Physical Exam:   BP (!) 167/54   Pulse 64   Temp 98.4 °F (36.9 °C) (Oral)   Resp 14   Ht 5' 3\" (1.6 m)   Wt 180 lb (81.6 kg)   SpO2 95%   BMI 31.89 kg/m²   Temp (24hrs), Av.4 °F (36.9 °C), Min:98.4 °F (36.9 °C), Max:98.4 °F (36.9 °C)    No results for input(s): POCGLU in the last 72 hours. No intake or output data in the 24 hours ending 22 1409    Physical Exam  Constitutional:       General: She is not in acute distress. Appearance: Normal appearance. HENT:      Head: Normocephalic and atraumatic.    Eyes:      Extraocular Movements: Extraocular movements intact. Conjunctiva/sclera: Conjunctivae normal.      Pupils: Pupils are equal, round, and reactive to light. Cardiovascular:      Rate and Rhythm: Normal rate and regular rhythm. Pulses: Normal pulses. Heart sounds: Normal heart sounds. No murmur heard. No friction rub. No gallop. Pulmonary:      Effort: Pulmonary effort is normal. No respiratory distress. Breath sounds: Normal breath sounds. No wheezing or rales. Abdominal:      General: Abdomen is flat. Bowel sounds are normal. There is no distension. Tenderness: There is no abdominal tenderness. There is no guarding. Skin:     Capillary Refill: Capillary refill takes less than 2 seconds. Coloration: Skin is not jaundiced or pale. Findings: Bruising present. Comments: Bruise on right elbow; right side of face related to recent fall    Neurological:      General: No focal deficit present. Mental Status: She is alert and oriented to person, place, and time. Sensory: No sensory deficit. Motor: Weakness present.       Comments: Left arm weakness related to past stroke;   Psychiatric:         Mood and Affect: Mood normal.       Investigations:     Laboratory Testing:  Recent Results (from the past 24 hour(s))   CBC with Auto Differential    Collection Time: 09/06/22 10:46 AM   Result Value Ref Range    WBC 5.8 3.5 - 11.0 k/uL    RBC 4.03 4.0 - 5.2 m/uL    Hemoglobin 12.3 12.0 - 16.0 g/dL    Hematocrit 36.7 36 - 46 %    MCV 91.1 80 - 100 fL    MCH 30.5 26 - 34 pg    MCHC 33.5 31 - 37 g/dL    RDW 14.6 11.5 - 14.9 %    Platelets 108 408 - 226 k/uL    MPV 6.9 6.0 - 12.0 fL    Seg Neutrophils 63 36 - 66 %    Lymphocytes 27 24 - 44 %    Monocytes 6 1 - 7 %    Eosinophils % 3 0 - 4 %    Basophils 1 0 - 2 %    Segs Absolute 3.60 1.3 - 9.1 k/uL    Absolute Lymph # 1.50 1.0 - 4.8 k/uL    Absolute Mono # 0.40 0.1 - 1.3 k/uL    Absolute Eos # 0.20 0.0 - 0.4 k/uL    Basophils Absolute 0.00 0.0 - 0.2 k/uL Basic Metabolic Panel    Collection Time: 09/06/22 10:46 AM   Result Value Ref Range    Glucose 120 (H) 70 - 99 mg/dL    BUN 25 (H) 8 - 23 mg/dL    Creatinine 0.72 0.50 - 0.90 mg/dL    Calcium 9.1 8.6 - 10.4 mg/dL    Sodium 141 135 - 144 mmol/L    Potassium 3.6 (L) 3.7 - 5.3 mmol/L    Chloride 104 98 - 107 mmol/L    CO2 27 20 - 31 mmol/L    Anion Gap 10 9 - 17 mmol/L    GFR Non-African American >60 >60 mL/min    GFR African American >60 >60 mL/min    GFR Comment         Magnesium    Collection Time: 09/06/22 10:46 AM   Result Value Ref Range    Magnesium 1.7 1.6 - 2.6 mg/dL   Phosphorus    Collection Time: 09/06/22 10:46 AM   Result Value Ref Range    Phosphorus 3.8 2.6 - 4.5 mg/dL   TSH w/reflex to FT4    Collection Time: 09/06/22 10:46 AM   Result Value Ref Range    TSH 1.48 0.30 - 5.00 uIU/mL   Protime-INR    Collection Time: 09/06/22 10:46 AM   Result Value Ref Range    Protime 13.9 11.8 - 14.6 sec    INR 1.1        Imaging/Diagnostics:  XR ELBOW RIGHT (MIN 3 VIEWS)    Result Date: 9/6/2022  EXAMINATION: THREE XRAY VIEWS OF THE RIGHT ELBOW 9/6/2022 9:29 am COMPARISON: None. HISTORY: ORDERING SYSTEM PROVIDED HISTORY: fall, pain TECHNOLOGIST PROVIDED HISTORY: fall, pain Reason for Exam: fall, pain FINDINGS: No fracture. No dislocation. No sizable elbow joint effusion. No destructive or blastic lesion. Mild degenerative changes, best seen coronoid process, radial head and distal humeral epicondyles. Mineralization WNL. No fracture or dislocation. XR WRIST LEFT (MIN 3 VIEWS)    Result Date: 8/18/2022  X-rays taken in clinic and preliminarily reviewed by me 8/17/22: 3 views of the left wrist demonstrate a comminuted, shortened significant dorsal angulation of the left fifth metacarpal neck/shaft fracture. Patient with evidence of old injury to the ulnar styloid.   Volar plate is present from old distal radius injury    CT HEAD WO CONTRAST    Result Date: 9/6/2022  EXAMINATION: CT OF THE HEAD WITHOUT CONTRAST; CT OF THE FACE WITHOUT CONTRAST 9/6/2022 11:08 am; 9/6/2022 11:09 am TECHNIQUE: CT of the head was performed without the administration of intravenous contrast. Automated exposure control, iterative reconstruction, and/or weight based adjustment of the mA/kV was utilized to reduce the radiation dose to as low as reasonably achievable.; CT of the face was performed without the administration of intravenous contrast. Multiplanar reformatted images are provided for review. Automated exposure control, iterative reconstruction, and/or weight based adjustment of the mA/kV was utilized to reduce the radiation dose to as low as reasonably achievable. COMPARISON: 07/10/2022 HISTORY: ORDERING SYSTEM PROVIDED HISTORY: fall, head trauma, on anticoagulation FINDINGS: CT HEAD: BRAIN/VENTRICLES: There is no acute intracranial hemorrhage, mass effect or midline shift. No abnormal extra-axial fluid collection. The gray-white differentiation is maintained without evidence of an acute infarct. There is no evidence of hydrocephalus. Mild generalized parenchymal volume loss and mild chronic small vessel disease. CT FACIAL BONES: FACIAL BONES: The maxilla, pterygoid plates and zygomatic arches are intact. The mandible is intact. The mandibular condyles are normally situated with similar advanced degenerative change involving the right TMJ. The nasal bones and maxillary nasal processes are intact. ORBITS: The globes appear intact. The extraocular muscles, optic nerve sheath complexes and lacrimal glands appear unremarkable. No retrobulbar hematoma or mass is seen. The orbital walls and rims are intact. Bilateral pseudophakia. SINUSES/MASTOIDS:  No acute fracture is seen. Sequelae of prior endoscopic sinus surgery with bilateral antrostomy and partial ethmoidectomies. Mild mucosal thickening involving floor of the maxillary sinuses with unchanged benign heterotopic ossification within the right maxillary sinus.  SOFT Sequelae of prior endoscopic sinus surgery with bilateral antrostomy and partial ethmoidectomies. Mild mucosal thickening involving floor of the maxillary sinuses with unchanged benign heterotopic ossification within the right maxillary sinus. SOFT TISSUES: No acute abnormality of the visualized skull or soft tissues. No acute intracranial abnormality. No acute traumatic injury of the facial bones. XR HIP 2-3 VW W PELVIS RIGHT    Result Date: 9/6/2022  EXAMINATION: ONE XRAY VIEW OF THE PELVIS AND TWO XRAY VIEWS RIGHT HIP 9/6/2022 9:29 am COMPARISON: CT 07/20/2022 HISTORY: ORDERING SYSTEM PROVIDED HISTORY: fall, pain TECHNOLOGIST PROVIDED HISTORY: fall, pain Reason for Exam: fall, pain FINDINGS: There is diffuse demineralization, which limits evaluation for acute fracture. The hip demonstrates normal alignment. No evidence of acute fracture. No focal osseus lesion. Pelvis is intact. Partially visualized postsurgical changes of the lumbar spine. Bowel gas obscures the sacrum and pubic symphysis. No acute osseous abnormality. Given the degree of osteopenia, nondisplaced fractures may be radiographically occult. If pain or concern for fracture persists, consider MR imaging. Assessment :      Primary Problem  Fall as cause of accidental injury in home as place of occurrence, initial encounter    Active Hospital Problems    Diagnosis Date Noted    Fall as cause of accidental injury in home as place of occurrence, initial encounter [W19. Tu Snyder, N04.683] 09/06/2022     Priority: Medium       Plan:     Patient status Admit as inpatient in the  Med/Surge    Investigation of fall  -X-ray of hip and pelvis on right: no acute osseous abnormality  -X-ray of right elbow was negative  -CBC was unremarkable  -BUN was elevated at 25, baseline is 18  -CT head without contrast showed no acute intracranial abnormality.    -CT facial bones without contrast showed no acute intracranial abnormality.   -Fall precautions in place  -Neurovascular checks      2. Hypertension  -BP today: 132/55  -Continue Coreg 12.5 mg po daily  -Amlodipine 5 mg on hold       3. Hyperlipidemia  -Continue lipitor 40 mg po daily  -Continue fenofibrate 160 mg po daily       4. Depression  -Continue Buspar 5 mg po TID  -Continue Celexa 20 mg po daily      5. CHFpEF  -Echo 4/8/22: Normal LVEF (>55%), Grade I (mild) LV diastolic dysfunction. Mild TR  -Continue Lasix 20 mg po daily      6. Restless leg syndrome  -Continue pramipexole 0.5 mg po nightly       7. Chronic DVT of bilateral lower limbs  -Continue Eliquis 5 mg po BID  -bilateral compression stockings       PT/OT consulted  Diet: Normal adult diet  Code: Full code  DVT prophylaxis: Eliquis 5 mg po BID  Social work consulted       Consultations:   [place] Glenard Schilder, MD  9/6/2022  2:09 PM    Copy sent to Dr. Jessica Chan MD   Attending Physician Statement  I have discussed the care of Yamil Messina and I have examined the patient myselft and taken ros and hpi , including pertinent history and exam findings,  with the resident. I have reviewed the key elements of all parts of the encounter with the resident. I agree with the assessment, plan and orders as documented by the resident. Spent 55 minutes in reviewing data/medicines/talking to patient/family,  explaining and answering all the questions.     23-year-old female admitted with fall, metabolic encephalopathy, hypertension, underlying depression, chronic bilateral lower extremity DVT on Eliquis, history of stroke recurrent lower extremity cellulitis, x-rays do not show any fracture, PT OT,  Diastolic heart failure, continue Lasix, monitor electrolytes  Anemia, hemoglobin 11.8, watch for any bleeding, no active bleeding at this time    Electronically signed by Baldomero Yi MD

## 2022-09-06 NOTE — ED PROVIDER NOTES
[facility] [address]      Pt Name: Wendi Graves  MRN: [id number]  [place] 1950  Date of evaluation: 9/6/22      CHIEF COMPLAINT       Chief Complaint   Patient presents with    Fall    Hip Pain     Right     HISTORY OF PRESENT ILLNESS   HPI 71 y.o. female presents with c/o fall and right hip pain elbow pain. Patient reports that she fell in her bedroom about 4 days ago. She says that she has chronic unsteadiness when she is walking. She thinks that she tripped. She denies passing out. She denies any preceding chest pain. She says that she fell onto a carpeted floor. She landed on her right hip and elbow. She did hit her head. She is on Eliquis. Recently got on the nurse home. She states that she has had these mobility problems for quite some time. She states that her  usually helps her getting around, but now he is ill and unable to take care of her. She saw her primary care provider on 1 September. He noted that she had been falling a lot secondary to underlying CVAs diabetes with peripheral neuropathy and that she had been unable to take care of her self and is quite incapacitated and needs to be in a skilled nursing facility. REVIEW OF SYSTEMS       Review of Systems   Constitutional:  Positive for activity change and fatigue. Negative for appetite change and fever. HENT:  Negative for congestion, nosebleeds and rhinorrhea. Eyes:  Negative for visual disturbance. Respiratory:  Positive for cough (chrnoic and unchanged from basleine). Negative for shortness of breath. Cardiovascular:  Negative for chest pain. Gastrointestinal:  Negative for abdominal pain, diarrhea and vomiting. Genitourinary:  Positive for frequency and urgency. Musculoskeletal:  Positive for arthralgias. Skin:  Negative for rash. Neurological:  Positive for weakness (generalized). Negative for headaches. Hematological:  Bruises/bleeds easily.    Psychiatric/Behavioral: Negative for confusion. PAST MEDICAL HISTORY     Past Medical History:   Diagnosis Date    Allergic rhinitis, cause unspecified     Back pain     lumbar    Bowel obstruction (HCC)     history of due to scar tissue, resolved non-surgically    C. difficile diarrhea     CAD (coronary artery disease)     no stent needed per pt.  Dr. Roman Morgan did cath at [facility] 2005    Cardiac murmur     Cellulitis     left leg    Cellulitis 2017 August    leg left leg/bug bite    Cerebral artery occlusion with cerebral infarction [facility])     TIA 2014    COVID-19     ONE YR AGO IN 4/25/2020 fever and cough    Diverticulosis of colon (without mention of hemorrhage)     GERD (gastroesophageal reflux disease)     GERD (gastroesophageal reflux disease)     on rx    History of blood transfusion     approx 2020        History of CHF (congestive heart failure)     History of MI (myocardial infarction) 2005    thought due to a blood clot    History of ovarian cyst 1970    had oopherectomy holly    History of peritonitis 1968    due to ruptured appendix age 13    HTN (hypertension)     Hx of blood clots     right leg    Hyperlipidemia     Intestinal or peritoneal adhesions with obstruction (postoperative) (postinfection) ([facility] [address]     Kidney infection     renal failure/sepsis/spider bite    Lateral epicondylitis  of elbow     MDRO (multiple drug resistant organisms) resistance     c diff    Muscle strain     right posterior shoulder    Other abnormal glucose     PONV (postoperative nausea and vomiting)     dry heaves    Pre-diabetes     Restless legs syndrome (RLS)     Snores     no cpap    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)     TIA (transient ischemic attack) 2014    Uses walker     Vitamin D deficiency     Wears glasses     Wellness examination     last seen 2 weeks ago       SURGICAL HISTORY       Past Surgical History:   Procedure Laterality Date    ABDOMEN SURGERY  1976    benign tumor removed near remaining ovary, 1.5 pounds hypertension      acetaminophen (TYLENOL) 325 MG tablet Take 2 tablets by mouth every 4 hours as needed for Pain      melatonin 3 MG TABS tablet Take 2 tablets by mouth nightly as needed (insomnia)      nitroGLYCERIN (NITROSTAT) 0.4 MG SL tablet Place 1 tablet under the tongue every 5 minutes as needed for Chest pain  Qty: 75 tablet, Refills: 3    Associated Diagnoses: Other chest pain      cyanocobalamin ([facility] VITAMIN B12) 1000 MCG tablet Take 1 tablet by mouth daily  Qty: 30 tablet, Refills: 3    Associated Diagnoses: Vitamin B12 deficiency      Lancets MISC 1 each by Does not apply route daily  Qty: 100 each, Refills: 3    Associated Diagnoses: Type 2 diabetes mellitus with other specified complication, unspecified whether long term insulin use (HCC)      blood glucose monitor strips Test 2 times a day & as needed for symptoms of irregular blood glucose. Qty: 100 strip, Refills: 5    Comments: Brand per patient preference. May round up to next available package size. Associated Diagnoses: Type 2 diabetes mellitus with other specified complication, unspecified whether long term insulin use (HCC)      Calcium Carbonate-Vitamin D 500-125 MG-UNIT TABS Take 1 tablet by mouth nightly              ALLERGIES     is allergic to bactrim [sulfamethoxazole-trimethoprim], codeine, diazepam, meperidine hcl, and seasonal.    FAMILY HISTORY     She indicated that her mother is . She indicated that her father is . She indicated that the status of her sister is unknown. She indicated that her brother is . She indicated that her maternal grandmother is . SOCIAL HISTORY      reports that she quit smoking about 5 years ago. Her smoking use included cigarettes. She started smoking about 27 years ago. She has a 10.00 pack-year smoking history. She has never used smokeless tobacco. She reports that she does not drink alcohol and does not use drugs.     PHYSICAL EXAM     INITIAL VITALS: BP (!) 189/76 Pulse 60   Temp 98.8 °F (37.1 °C)   Resp 16   Ht 5' 3\" (1.6 m)   Wt 180 lb (81.6 kg)   SpO2 96%   BMI 31.89 kg/m²   Gen: nad  Head: Normocephalic, atraumatic ecchymosis to r. Side of face  Eye: Pupils equal round reactive to light, no conjunctivitis  ENT: MMM, no septal hematoma  Neck: no midline ttp JVD  Heart: Regular rate and rhythm no murmurs  Lungs: Clear to auscultation bilaterally, no respiratory distress  Chest wall: No crepitus, no tenderness palpation  Abdomen: Soft, nontender, nondistended, with no peritoneal signs  Skin: ecchymosis over the r. Elbow. Neurologic: Patient is alert and oriented x3, motor and sensation is intact in all 4 extremities, fluent speech  Extremities: tenderness over greater trochanter. MEDICAL DECISION MAKING:     [facility]  And Emergency Department course  71 y.o. female chronic mobility and balance issues presenting 4 days after a fall with signs of head trauma facial trauma elbow trauma and hip pain. CT scan of the head and face showed no fractures. X-rays of the elbow and hip also showed no traumatic injury. The patient is unable to take care of her self at home. Elevated BUN to creatinine ratio and an elevation in her hematocrit suggesting some degree of dehydration. Placing the patient in the hospital because of her inability to care for herself. There is no specific traumatic injury that requires hospital admission and so she is not admitted to the surgical service. Discussed with the internal medicine service. DIAGNOSTIC RESULTS     RADIOLOGY:All plain film, CT, MRI, and formal ultrasound images (except ED bedside ultrasound) are read by the radiologist and the images and interpretations are directly viewed by the emergency physician. CT FACIAL BONES WO CONTRAST   Final Result   No acute intracranial abnormality. No acute traumatic injury of the facial bones. CT HEAD WO CONTRAST   Final Result   No acute intracranial abnormality. Question:   Indication of Use     Answer:   History of DVT/PE (indefinite)    DISCONTD: atorvastatin (LIPITOR) tablet 40 mg    busPIRone (BUSPAR) tablet 5 mg    carvedilol (COREG) tablet 12.5 mg    citalopram (CELEXA) tablet 20 mg    fenofibrate (TRIGLIDE) tablet 160 mg    DISCONTD: ferrous sulfate (IRON 325) tablet 325 mg    furosemide (LASIX) tablet 20 mg    melatonin tablet 6 mg    pantoprazole (PROTONIX) tablet 40 mg    pramipexole (MIRAPEX) tablet 0.5 mg    sodium chloride flush 0.9 % injection 5-40 mL    sodium chloride flush 0.9 % injection 5-40 mL    0.9 % sodium chloride infusion    OR Linked Order Group     ondansetron (ZOFRAN-ODT) disintegrating tablet 4 mg     ondansetron (ZOFRAN) injection 4 mg    polyethylene glycol (GLYCOLAX) packet 17 g    OR Linked Order Group     acetaminophen (TYLENOL) tablet 650 mg     acetaminophen (TYLENOL) suppository 650 mg    OR Linked Order Group     potassium chloride (KLOR-CON M) extended release tablet 40 mEq     potassium bicarb-citric acid (EFFER-K) effervescent tablet 40 mEq     potassium chloride 10 mEq/100 mL IVPB (Peripheral Line)    ferrous sulfate (IRON 325) tablet 325 mg    atorvastatin (LIPITOR) tablet 20 mg    HYDROcodone-acetaminophen (NORCO) 5-325 MG per tablet 1 tablet     -------------------------  CRITICAL CARE:   CONSULTS: IP CONSULT TO INTERNAL MEDICINE  IP CONSULT TO SOCIAL WORK  PROCEDURES: Procedures     FINAL IMPRESSION      1. Generalized weakness          DISPOSITION/PLAN   DISPOSITION Admitted 09/06/2022 02:06:56 PM      PATIENT REFERRED TO:  No follow-up provider specified.     DISCHARGE MEDICATIONS:  Current Discharge Medication List            Naheed Collins MD  Attending Emergency Physician                      Naheed Collins MD  09/07/22 3296

## 2022-09-06 NOTE — PROGRESS NOTES
Medication History completed:    New medications: cholecalciferol    Medications discontinued: trazodone, ergocalciferol    Changes to dosing: none    Stated allergies: As listed    Other pertinent information: Medications confirmed with discharge list from [facility].      Thank you,  Mauri Breaux, PharmD, BCPS  [phone]

## 2022-09-06 NOTE — CARE COORDINATION
Dann Weaver from [facility] left VM for ACM stating patient continues to get denied for placement to their facility. ACM spoke with Abram Shaver RN from case management for any recommendations. Berenice will send a pre cert for placement and have someone go down to the ED to speak with patient and check on needs. ACM will contact patient if DC home.

## 2022-09-06 NOTE — ED NOTES
Pt placed on and taken off bedpan. 30mL of clear yellow urine returned.       Alon Cruz RN  09/06/22 0819

## 2022-09-06 NOTE — CARE COORDINATION
Received a call from ANABEL from Dr Mariah Euceda office advising patient is in the Emergency room and may need placement. Patient has continued to fall at home . She was discharged in July to [place] [address].  Went ahead and Faxed a new referral to [place] [facility] [address] for probable placement. .  She may need long term care. Left a Voice mail message with Rod Longoria From [facility] to see if patient would qualify for Medicaid.     Electronically signed by Amarilis Mullen RN on 9/6/2022 at 3:10 PM

## 2022-09-07 LAB
ABSOLUTE EOS #: 0.4 K/UL (ref 0–0.4)
ABSOLUTE LYMPH #: 1.5 K/UL (ref 1–4.8)
ABSOLUTE MONO #: 0.4 K/UL (ref 0.1–1.3)
ANION GAP SERPL CALCULATED.3IONS-SCNC: 10 MMOL/L (ref 9–17)
BASOPHILS # BLD: 1 % (ref 0–2)
BASOPHILS ABSOLUTE: 0.1 K/UL (ref 0–0.2)
BUN BLDV-MCNC: 25 MG/DL (ref 8–23)
CALCIUM SERPL-MCNC: 8.8 MG/DL (ref 8.6–10.4)
CHLORIDE BLD-SCNC: 107 MMOL/L (ref 98–107)
CO2: 26 MMOL/L (ref 20–31)
CREAT SERPL-MCNC: 0.73 MG/DL (ref 0.5–0.9)
EOSINOPHILS RELATIVE PERCENT: 6 % (ref 0–4)
GFR AFRICAN AMERICAN: >60 ML/MIN
GFR NON-AFRICAN AMERICAN: >60 ML/MIN
GFR SERPL CREATININE-BSD FRML MDRD: ABNORMAL ML/MIN/{1.73_M2}
GLUCOSE BLD-MCNC: 124 MG/DL (ref 70–99)
HCT VFR BLD CALC: 35.2 % (ref 36–46)
HEMOGLOBIN: 11.8 G/DL (ref 12–16)
LYMPHOCYTES # BLD: 24 % (ref 24–44)
MCH RBC QN AUTO: 30.8 PG (ref 26–34)
MCHC RBC AUTO-ENTMCNC: 33.6 G/DL (ref 31–37)
MCV RBC AUTO: 91.5 FL (ref 80–100)
MONOCYTES # BLD: 7 % (ref 1–7)
PDW BLD-RTO: 14.6 % (ref 11.5–14.9)
PLATELET # BLD: 246 K/UL (ref 150–450)
PMV BLD AUTO: 7 FL (ref 6–12)
POTASSIUM SERPL-SCNC: 3.8 MMOL/L (ref 3.7–5.3)
RBC # BLD: 3.84 M/UL (ref 4–5.2)
SEG NEUTROPHILS: 62 % (ref 36–66)
SEGMENTED NEUTROPHILS ABSOLUTE COUNT: 3.7 K/UL (ref 1.3–9.1)
SODIUM BLD-SCNC: 143 MMOL/L (ref 135–144)
WBC # BLD: 6.1 K/UL (ref 3.5–11)

## 2022-09-07 PROCEDURE — 36415 COLL VENOUS BLD VENIPUNCTURE: CPT

## 2022-09-07 PROCEDURE — 99232 SBSQ HOSP IP/OBS MODERATE 35: CPT | Performed by: INTERNAL MEDICINE

## 2022-09-07 PROCEDURE — 6370000000 HC RX 637 (ALT 250 FOR IP)

## 2022-09-07 PROCEDURE — 97530 THERAPEUTIC ACTIVITIES: CPT

## 2022-09-07 PROCEDURE — 6370000000 HC RX 637 (ALT 250 FOR IP): Performed by: NURSE PRACTITIONER

## 2022-09-07 PROCEDURE — 1200000000 HC SEMI PRIVATE

## 2022-09-07 PROCEDURE — 85025 COMPLETE CBC W/AUTO DIFF WBC: CPT

## 2022-09-07 PROCEDURE — 97166 OT EVAL MOD COMPLEX 45 MIN: CPT

## 2022-09-07 PROCEDURE — 2580000003 HC RX 258

## 2022-09-07 PROCEDURE — 80048 BASIC METABOLIC PNL TOTAL CA: CPT

## 2022-09-07 PROCEDURE — 97162 PT EVAL MOD COMPLEX 30 MIN: CPT

## 2022-09-07 RX ADMIN — BUSPIRONE HYDROCHLORIDE 5 MG: 5 TABLET ORAL at 09:37

## 2022-09-07 RX ADMIN — Medication 6 MG: at 22:06

## 2022-09-07 RX ADMIN — HYDROCODONE BITARTRATE AND ACETAMINOPHEN 1 TABLET: 5; 325 TABLET ORAL at 06:15

## 2022-09-07 RX ADMIN — FUROSEMIDE 20 MG: 20 TABLET ORAL at 09:36

## 2022-09-07 RX ADMIN — CARVEDILOL 12.5 MG: 12.5 TABLET, FILM COATED ORAL at 20:04

## 2022-09-07 RX ADMIN — CARVEDILOL 12.5 MG: 12.5 TABLET, FILM COATED ORAL at 09:37

## 2022-09-07 RX ADMIN — FERROUS SULFATE TAB 325 MG (65 MG ELEMENTAL FE) 325 MG: 325 (65 FE) TAB at 09:36

## 2022-09-07 RX ADMIN — SODIUM CHLORIDE, PRESERVATIVE FREE 10 ML: 5 INJECTION INTRAVENOUS at 09:40

## 2022-09-07 RX ADMIN — PANTOPRAZOLE SODIUM 40 MG: 40 TABLET, DELAYED RELEASE ORAL at 05:28

## 2022-09-07 RX ADMIN — CITALOPRAM HYDROBROMIDE 20 MG: 20 TABLET ORAL at 09:37

## 2022-09-07 RX ADMIN — PRAMIPEXOLE DIHYDROCHLORIDE 0.5 MG: 0.25 TABLET ORAL at 20:08

## 2022-09-07 RX ADMIN — SODIUM CHLORIDE, PRESERVATIVE FREE 10 ML: 5 INJECTION INTRAVENOUS at 20:07

## 2022-09-07 RX ADMIN — FENOFIBRATE 160 MG: 160 TABLET ORAL at 09:37

## 2022-09-07 RX ADMIN — ATORVASTATIN CALCIUM 20 MG: 20 TABLET, FILM COATED ORAL at 20:04

## 2022-09-07 RX ADMIN — HYDROCODONE BITARTRATE AND ACETAMINOPHEN 1 TABLET: 5; 325 TABLET ORAL at 15:29

## 2022-09-07 RX ADMIN — APIXABAN 5 MG: 5 TABLET, FILM COATED ORAL at 20:03

## 2022-09-07 RX ADMIN — BUSPIRONE HYDROCHLORIDE 5 MG: 5 TABLET ORAL at 15:29

## 2022-09-07 RX ADMIN — BUSPIRONE HYDROCHLORIDE 5 MG: 5 TABLET ORAL at 20:04

## 2022-09-07 RX ADMIN — ACETAMINOPHEN 650 MG: 325 TABLET, FILM COATED ORAL at 22:06

## 2022-09-07 RX ADMIN — APIXABAN 5 MG: 5 TABLET, FILM COATED ORAL at 09:37

## 2022-09-07 ASSESSMENT — PAIN - FUNCTIONAL ASSESSMENT: PAIN_FUNCTIONAL_ASSESSMENT: ACTIVITIES ARE NOT PREVENTED

## 2022-09-07 ASSESSMENT — ENCOUNTER SYMPTOMS
RHINORRHEA: 0
ABDOMINAL PAIN: 0
VOMITING: 0
NAUSEA: 0
DIARRHEA: 0
CONSTIPATION: 0
COLOR CHANGE: 1
ABDOMINAL DISTENTION: 0

## 2022-09-07 ASSESSMENT — PAIN DESCRIPTION - ONSET: ONSET: GRADUAL

## 2022-09-07 ASSESSMENT — PAIN DESCRIPTION - LOCATION
LOCATION: NECK;ARM;BACK
LOCATION: HAND;BACK

## 2022-09-07 ASSESSMENT — PAIN DESCRIPTION - FREQUENCY: FREQUENCY: INTERMITTENT

## 2022-09-07 ASSESSMENT — PAIN DESCRIPTION - PAIN TYPE: TYPE: ACUTE PAIN

## 2022-09-07 ASSESSMENT — PAIN SCALES - GENERAL
PAINLEVEL_OUTOF10: 0
PAINLEVEL_OUTOF10: 3
PAINLEVEL_OUTOF10: 0
PAINLEVEL_OUTOF10: 6

## 2022-09-07 ASSESSMENT — PAIN SCALES - WONG BAKER: WONGBAKER_NUMERICALRESPONSE: 0

## 2022-09-07 ASSESSMENT — PAIN DESCRIPTION - DESCRIPTORS
DESCRIPTORS: ACHING
DESCRIPTORS: DISCOMFORT

## 2022-09-07 ASSESSMENT — PAIN DESCRIPTION - ORIENTATION: ORIENTATION: LEFT;LOWER

## 2022-09-07 NOTE — PROGRESS NOTES
Physical Therapy  Facility/Department: [facility] MED SURG  Physical Therapy Initial Assessment    Name: Sixto Calhoun  : 1950  MRN: [id number]  Date of Service: 2022    Discharge Recommendations:  Patient would benefit from continued therapy after discharge          Patient Diagnosis(es): The encounter diagnosis was Generalized weakness. Past Medical History:  has a past medical history of Allergic rhinitis, cause unspecified, Back pain, Bowel obstruction (HCC), C. difficile diarrhea, CAD (coronary artery disease), Cardiac murmur, Cellulitis, Cellulitis, Cerebral artery occlusion with cerebral infarction ([address] COVID-19, Diverticulosis of colon (without mention of hemorrhage), GERD (gastroesophageal reflux disease), GERD (gastroesophageal reflux disease), History of blood transfusion, History of CHF (congestive heart failure), History of MI (myocardial infarction), History of ovarian cyst, History of peritonitis, HTN (hypertension), Hx of blood clots, Hyperlipidemia, Intestinal or peritoneal adhesions with obstruction (postoperative) (postinfection) ([address] Kidney infection, Lateral epicondylitis  of elbow, MDRO (multiple drug resistant organisms) resistance, Muscle strain, Other abnormal glucose, PONV (postoperative nausea and vomiting), Pre-diabetes, Restless legs syndrome (RLS), Snores, 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[address] TIA (transient ischemic attack), Uses walker, Vitamin D deficiency, Wears glasses, and Wellness examination. Past Surgical History:  has a past surgical history that includes Ovary removal (); colectomy (); Appendectomy (); Ovary removal (); Hysterectomy (); Bunionectomy (Left); sinus surgery (); Colonoscopy; other surgical history (2014); cyst removal (Right); Wrist fracture surgery (Left, 2019); Upper gastrointestinal endoscopy (N/A, 2019); Upper gastrointestinal endoscopy (N/A, 2019);  Upper gastrointestinal endoscopy (N/A, 08/23/2019); Abdomen surgery (1976); lumbar fusion (N/A, 02/10/2020); Cardiac catheterization; Cardiac catheterization (2005); [place] tooth extraction; lumbar fusion (N/A, 06/17/2020); back surgery; cervical fusion (05/21/2021); cervical fusion (N/A, 05/21/2021); Finger Closed Reduction (Left, 08/24/2022); and Finger Closed Reduction (Left, 8/24/2022). Assessment   Assessment: pt presents with decreased strength in B LEs and is with decreased balance in sitting and standing and is with decreased motor control of L LE and is with gait pattern that makes her a high fall risk. Recommend cont therapy in acute setting to address deficits  Specific Instructions for Next Treatment: work on motor control L LE  Therapy Prognosis: Good  Decision Making: Medium Complexity  Exam: strength, ROM, balance mobility  Requires PT Follow-Up: Yes  Activity Tolerance  Activity Tolerance: Patient tolerated treatment well     Plan   Plan  Plan: 3-5 times per week  Specific Instructions for Next Treatment: work on motor control L LE  Safety Devices  Type of Devices: All fall risk precautions in place, Call light within reach, Chair alarm in place, Gait belt, Patient at risk for falls, Left in chair, Nurse notified  Restraints  Restraints Initially in Place: No     Restrictions  Restrictions/Precautions  Restrictions/Precautions: Fall Risk, General Precautions, Up as Tolerated  Required Braces or Orthoses?: No  Implants present? : Metal implants (Wilfredo in back, wilfredo in L wrist from breaking it)  Position Activity Restriction  Other position/activity restrictions: Pt in splint LUE from closed reduction pinning of fifth metacarpal bone. Questionable WB status. Subjective   Pain: Pt reports 6/10 pain in L lateral side of hand and neck area. General  Chart Reviewed: Yes  Patient assessed for rehabilitation services?: Yes  Subjective  Subjective: Pt and daughter in room.  Pt agreeable to therapy         Social/Functional History  Social/Functional History  Lives With: Spouse  Type of Home: Apartment  Home Layout: One level  Home Access: Stairs to enter without rails  Entrance Stairs - Number of Steps: 1  Bathroom Shower/Tub: Shower chair with back, Tub/Shower unit, Curtain  Bathroom Toilet: Handicap height (bedside commode from last surgery)  Bathroom Equipment: Grab bars in shower, Shower chair, Hand-held shower, Toilet raiser, Grab bars around toilet  Bathroom Accessibility: Accessible, Wheelchair accessible, Walker accessible  Home Equipment: [address], Walker, 4 wheeled, [place], rolling, Gonzalez Enrrique, UIBLUEPRINT, UIBLUEPRINT, WriteOn, Bitex.la, Tribunat bars, Scientific Intake aid (uses the walker usually, WC for long distance)  Has the patient had two or more falls in the past year or any fall with injury in the past year?: Yes (10 falls, looses balance, had TIA and has weaker L side per pt)  Receives Help From: Family, Neighbor (dtr comes 2x week, neightbor helps with showers, spouse has trouble caring for pt and cannot get her off the floor)  ADL Assistance: Independent (Daughter stays with pt while she takes a shower)  Homemaking Assistance: Needs assistance (Daughter does the laundry,  does most the cooking, and neighbor cleans for patient)  Ambulation Assistance: Needs assistance (Uses walker)  Transfer Assistance: Needs assistance  Active : No  Patient's  Info:  drives pt around  Mode of Transportation: SUV, Truck (Pt primarily goes in the SUV)  Occupation: Retired  Type of Occupation: Hairdresser  Leisure & Hobbies: dance, read, phone games  IADL Comments: Hospital bed that adjusts and a lift chair  Additional Comments: Pt reports that  is not able to provide assist as much anymore due to being \"losing his breathe\".  Pt reports that daughter comes over 2x a week and that she has a neighbor who is a caregiver who comes over every now and then to assist.  Vision/Hearing  Vision  Vision: Impaired  Vision Exceptions: Wears glasses at all times  Hearing  Hearing: Within functional limits    Cognition   Orientation  Overall Orientation Status: Within Functional Limits     Objective   Heart Rate: 61  BP: (!) 151/62  MAP (Calculated): 91.67  Resp: 16  SpO2: 97 %  Comment: no reports of SOB or diziness with mobility        Gross Assessment  AROM: Generally decreased, functional  PROM: Within functional limits  Strength: Grossly decreased, non-functional  Coordination: Grossly decreased, non-functional  Tone: Normal  Sensation:  (reports intact)        Strength RLE  Strength RLE:  (4/5 hips and knees, ankle WFL)  Strength LLE  Strength LLE:  (4/5 hips and knees, ankle is 4/5 with MMT)  Coordination  Movements Are Fluid And Coordinated: No  Coordination and Movement description:  (pt with difficulty advancing L LE with gait, likely a motor control issue, gait pattern makes pt high fall risk)  Motor Control  Gross Motor?: Exceptions  Comments: L LE impaired, difficulty with advancing L LE and with foot placement  Coordination  Rapid Alternating Movements:  (able to perform RAMBO with B ankles)        Bed mobility  Supine to Sit: Contact guard assistance  Sit to Supine: Unable to assess (up in chair)  Scooting: Minimal assistance  Bed Mobility Comments: CGA-Min A due to L hand fracture on 5th digit  Transfers  Sit to Stand: Contact guard assistance;Minimal Assistance  Stand to sit: Minimal Assistance  Bed to Chair: Moderate assistance  Comment: pt with poor eccentric control with stand to sit, VC for hand placement. USE of [facility] for stability and therapist gave Mod A for transfer to chair as pt with decreased motor control of L LE with transfers. gait  Ambulation  Surface: level tile  Device: Rolling Walker  Assistance:  Moderate assistance  Gait Deviations: Slow Alicia;Decreased step length;Decreased step height;Decreased arm swing;Decreased head and trunk rotation;Deviated path (pt with decreased motor control of L LE; toes not pointing forward, decreased reaction time/requires increased time to take steps)  Distance: 8ft  Comments: pts gait pattern make pt high fall risk  Stairs/Curb  Stairs?: No     Balance  Posture: Fair  Sitting - Static: Good;-  Sitting - Dynamic: Good;-  Standing - Static: Fair  Standing - Dynamic: Poor                    AM-PAC Score  AM-PAC Inpatient Mobility Raw Score : 14 (09/07/22 1323)  AM-PAC Inpatient T-Scale Score : 38.1 (09/07/22 1323)  Mobility Inpatient CMS 0-100% Score: 61.29 (09/07/22 1323)  Mobility Inpatient CMS G-Code Modifier : CL (09/07/22 1323)            Goals  Short Term Goals  Time Frame for Short term goals: 5 days  Short term goal 1: pt to perform bed mobility with SBA  Short term goal 2: pt to transfer STS with Min A with [employer] and demonstrate good ecentric control with sitting  Short term goal 3: pt to ambulate 20 ft with [employer] and Min A  Short term goal 4: pt to be indep with seated HEP  Patient Goals   Patient goals : to be more independent       Education  Patient Education  Education Given To: Patient; Family  Education Provided: Role of Therapy;Plan of Care;Home Exercise Program;Precautions; Family Education; Fall Prevention Strategies  Education Method: Demonstration;Verbal  Barriers to Learning: None  Education Outcome: Verbalized understanding      Therapy Time   Individual Concurrent Group Co-treatment   Time In 0850         Time Out 0925         Minutes 35         Timed Code Treatment Minutes: [address], PT

## 2022-09-07 NOTE — PLAN OF CARE
Problem: Discharge Planning  Goal: Discharge to home or other facility with appropriate resources  Outcome: Progressing  Flowsheets (Taken 9/6/2022 1948 by Shayy Ordoñez RN)  Discharge to home or other facility with appropriate resources:   Identify barriers to discharge with patient and caregiver   Arrange for needed discharge resources and transportation as appropriate   Identify discharge learning needs (meds, wound care, etc)     Problem: Pain  Goal: Verbalizes/displays adequate comfort level or baseline comfort level  Outcome: Progressing  Flowsheets (Taken 9/7/2022 0448)  Verbalizes/displays adequate comfort level or baseline comfort level:   Encourage patient to monitor pain and request assistance   Assess pain using appropriate pain scale   Administer analgesics based on type and severity of pain and evaluate response   Implement non-pharmacological measures as appropriate and evaluate response     Problem: Safety - Adult  Goal: Free from fall injury  Outcome: Progressing  Flowsheets (Taken 9/7/2022 0448)  Free From Fall Injury: Instruct family/caregiver on patient safety
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
## 2022-09-07 NOTE — CARE COORDINATION
CASE MANAGEMENT NOTE:    Admission Date:  9/6/2022 Elin Banda is a 71 y.o.  female    Admitted for : Fall as cause of accidental injury in home as place of occurrence, initial encounter [W19. Pinky Lunch, I29.902]    Met with:  Patient    PCP:  Stacy Castro MD                                Insurance:  [facility]      Is patient alert and oriented at time of discussion:  Yes    Current Residence/ Living Arrangements:  independently at home with spouse ,1 story          Current Services PTA:  Yes    Does patient go to outpatient dialysis: No  If yes, location and chair time:   Who is their nephrologist?     Is patient agreeable to VNS: Yes    Freedom of choice provided:  Yes    List of [address] provided: Yes    VNS chosen:  Yes, current with [address]    DME:  straight cane, walker, shower chair, and grab bars    Home Oxygen: No    Nebulizer: No    CPAP/BIPAP: No    Supplier: N/A    Potential Assistance Needed: No    SNF needed: Yes    Freedom of choice and list provided: Yes    Pharmacy:  [facility]       Is patient currently receiving oral anticoagulation therapy? Yes, eliquis    Is the Patient an [facility] with Readmission Risk Score greater than 14%? No  If yes, pt needs a follow up appointment made within 7 days.     Family Members/Caregivers that pt would like involved in their care:    Yes    If yes, list name here:  True-spouse    Transportation Provider:  Family             Discharge Plan:  9/7/2022; [address] [facility]; DME straight cane, walker, shower chair, and grab bars; VNS: current with [address]; Berenice RN referred to HELP for possible medicaid; LSW following for possible SNF; IMM 9/7/2022 @ 1045; ANNALEE NEEDS SIGNED/COMPLETED//KR                    Electronically signed by: Wilver Heart RN on 9/7/2022 at 1:21 PM

## 2022-09-07 NOTE — ACP (ADVANCE CARE PLANNING)
Advance Care Planning     Advance Care Planning Activator (Inpatient)  Conversation Note      Date of ACP Conversation: 9/7/2022     Conversation Conducted with: Patient with Decision Making Capacity    ACP Activator: Nilo Yanez RN        Health Care Decision Maker:     Current Designated Health Care Decision Maker:     Primary Decision Maker: Dianne Chacon - [phone]    Secondary Decision Maker: Mame Sebastian - [phone]    Today we documented Decision Maker(s) consistent with Legal Next of Kin hierarchy. Care Preferences    Ventilation: \"If you were in your present state of health and suddenly became very ill and were unable to breathe on your own, what would your preference be about the use of a ventilator (breathing machine) if it were available to you? \"      Would the patient desire the use of ventilator (breathing machine)?: yes    \"If your health worsens and it becomes clear that your chance of recovery is unlikely, what would your preference be about the use of a ventilator (breathing machine) if it were available to you? \"     Would the patient desire the use of ventilator (breathing machine)?: Yes      Resuscitation  \"CPR works best to restart the heart when there is a sudden event, like a heart attack, in someone who is otherwise healthy. Unfortunately, CPR does not typically restart the heart for people who have serious health conditions or who are very sick. \"    \"In the event your heart stopped as a result of an underlying serious health condition, would you want attempts to be made to restart your heart (answer \"yes\" for attempt to resuscitate) or would you prefer a natural death (answer \"no\" for do not attempt to resuscitate)? \" yes       [] Yes   [] No   Educated Patient / [facility] regarding differences between Advance Directives and portable DNR orders.     Length of ACP Conversation in minutes:      Conversation Outcomes:  [x] ACP discussion completed  [] Existing advance directive reviewed with patient; no changes to patient's previously recorded wishes  [] New Advance Directive completed  [] Portable Do Not Rescitate prepared for Provider review and signature  [] POLST/POST/MOLST/MOST prepared for Provider review and signature      Follow-up plan:    [] Schedule follow-up conversation to continue planning  [] Referred individual to Provider for additional questions/concerns   [] Advised patient/agent/surrogate to review completed ACP document and update if needed with changes in condition, patient preferences or care setting    [] This note routed to one or more involved healthcare providers
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
## 2022-09-07 NOTE — PLAN OF CARE
Problem: Discharge Planning  Goal: Discharge to home or other facility with appropriate resources  9/7/2022 1831 by Dharmesh Albert RN  Outcome: Progressing  9/7/2022 0448 by Sam Jernigan RN  Outcome: Progressing  Flowsheets (Taken 9/6/2022 1948 by Velvet Fierro RN)  Discharge to home or other facility with appropriate resources:   Identify barriers to discharge with patient and caregiver   Arrange for needed discharge resources and transportation as appropriate   Identify discharge learning needs (meds, wound care, etc)     Problem: Pain  Goal: Verbalizes/displays adequate comfort level or baseline comfort level  9/7/2022 1831 by Dharmesh Albert RN  Outcome: Progressing  9/7/2022 0448 by Sam Jernigan RN  Outcome: Progressing  Flowsheets (Taken 9/7/2022 0448)  Verbalizes/displays adequate comfort level or baseline comfort level:   Encourage patient to monitor pain and request assistance   Assess pain using appropriate pain scale   Administer analgesics based on type and severity of pain and evaluate response   Implement non-pharmacological measures as appropriate and evaluate response     Problem: Safety - Adult  Goal: Free from fall injury  9/7/2022 1831 by Dharmesh Albert RN  Outcome: Progressing  9/7/2022 0448 by Sam Jernigan RN  Outcome: Progressing  Flowsheets (Taken 9/7/2022 0448)  Free From Fall Injury: Instruct family/caregiver on patient safety     Problem: Chronic Conditions and Co-morbidities  Goal: Patient's chronic conditions and co-morbidity symptoms are monitored and maintained or improved  Outcome: Progressing     Problem: Physical Therapy - Adult  Goal: By Discharge: Performs mobility at highest level of function for planned discharge setting. See evaluation for individualized goals.   9/7/2022 1324 by Justina Bearden PT  Outcome: Progressing

## 2022-09-07 NOTE — PROGRESS NOTES
[address]    PROGRESS NOTE             9/7/2022    3:03 PM    Name:   Lyn Hinton  MRN:     [id number]     Acct:      [de-identified]   Room:   2049/2049-01  IP Day:  1  Admit Date:  9/6/2022  8:51 AM    PCP:  Shae Zapata MD  Code Status:  Full Code    Subjective:     C/C:   Chief Complaint   Patient presents with    Fall    Hip Pain     Right     Interval History Status: improved. The patient was seen and examined at the bedside. No acute overnight events as per patient and RN. Vital signs stable. BP elevated (151/62). Social work saw patient (patient agreed to go to SNF). No headache, dizziness, syncope, chest pain, shortness of breath, nausea, vomiting, constipation or diarrhea. Brief History:     The patient is a 71 y.o. Non- / non  female with a history of stroke, CHFpEF, chronic DVT in bilateral lower limbs on Eliquis, obesity, recurrent right leg cellulitis, MI, hypertension, hyperlipidemia, restless leg syndrome, depression presents to the clinic after suffering a fall 2 days ago. The patient was ambulating around her home when she lost balance and fell down on her right elbow and right head. The patient normally uses a walker and wheelchair, however, it unclear if she was using the walker at the time of the fall. She denies any dizziness, light-headedness, and vision changes at the time of the fall. The fall was unwitnessed. She denies loosing consciousness. The patient reports several falls over the past year. She lives at home with her , however he is ill  and unable to help her at home. The patient reports right elbow bruising and pain and right periorbital/cheek bruising and pain, urinary frequency, urinary urgency, chronic cough, fatigue, and left arm weakness (chronic since her stroke).  She denies chest pain, shortness of breath, cough, chest pain, abdominal pain, nausea, vomiting, constipation and diarrhea. The patient was recently discharged from [facility] on 7/21/22 where she was admitted for management of fracture to body of sternum following a fall and right leg cellulitis. She received IV antibiotics and was discharged to an extended care facility. During her stay, the patient fell and sustained a left metacarpal neck fracture. She follows up with [facility] plastic surgeons for treatment of the fracture. The ED, the patient was vitally stable, however BP was elevated (195/66). X-ray of hip and pelvis on right showed no acute osseous abnormality. X-ray of right elbow was negative. CBC was unremarkable. CT head without contrast showed no acute intracranial abnormality. CT facial bones without contrast showed no acute intracranial abnormality. Glucose was 120. The patient was admitted for investigation of fall. Review of Systems:     Review of Systems   Constitutional:  Negative for activity change, chills, fatigue and fever. HENT:  Negative for congestion and rhinorrhea. Cardiovascular:  Negative for chest pain, palpitations and leg swelling. Gastrointestinal:  Negative for abdominal distention, abdominal pain, constipation, diarrhea, nausea and vomiting. Endocrine: Negative for cold intolerance and heat intolerance. Genitourinary:  Negative for dysuria, frequency and urgency. Musculoskeletal:  Negative for arthralgias and myalgias. Skin:  Positive for color change. Negative for pallor and rash. Bruise on right elbow and periorbital (right). Neurological:  Positive for weakness. Negative for dizziness, tremors, syncope, light-headedness and headaches. Weakness of right post-stroke   Psychiatric/Behavioral:  Negative for agitation and confusion. Medications: Allergies:     Allergies   Allergen Reactions    Bactrim [Sulfamethoxazole-Trimethoprim] Other (See Comments)     confusion    Codeine Itching    Diazepam Other (See Comments)    Meperidine Hcl Other (See Comments)    Seasonal        Current Meds:   Scheduled Meds:    apixaban  5 mg Oral BID    busPIRone  5 mg Oral TID    carvedilol  12.5 mg Oral BID    citalopram  20 mg Oral Daily    fenofibrate  160 mg Oral Daily    furosemide  20 mg Oral Daily    pantoprazole  40 mg Oral QAM AC    pramipexole  0.5 mg Oral Nightly    sodium chloride flush  5-40 mL IntraVENous 2 times per day    ferrous sulfate  325 mg Oral Daily with breakfast    atorvastatin  20 mg Oral Nightly     Continuous Infusions:    sodium chloride       PRN Meds: albuterol-ipratropium, melatonin, sodium chloride flush, sodium chloride, ondansetron **OR** ondansetron, polyethylene glycol, acetaminophen **OR** acetaminophen, potassium chloride **OR** potassium alternative oral replacement **OR** potassium chloride, HYDROcodone 5 mg - acetaminophen    Data:     Past Medical History:   has a past medical history of Allergic rhinitis, cause unspecified, Back pain, Bowel obstruction (HCC), C. difficile diarrhea, CAD (coronary artery disease), Cardiac murmur, Cellulitis, Cellulitis, Cerebral artery occlusion with cerebral infarction ([facility] [address] COVID-19, Diverticulosis of colon (without mention of hemorrhage), GERD (gastroesophageal reflux disease), GERD (gastroesophageal reflux disease), History of blood transfusion, History of CHF (congestive heart failure), History of MI (myocardial infarction), History of ovarian cyst, History of peritonitis, HTN (hypertension), Hx of blood clots, Hyperlipidemia, Intestinal or peritoneal adhesions with obstruction (postoperative) (postinfection) ([address] Kidney infection, Lateral epicondylitis  of elbow, MDRO (multiple drug resistant organisms) resistance, Muscle strain, Other abnormal glucose, PONV (postoperative nausea and vomiting), Pre-diabetes, Restless legs syndrome (RLS), Snores, 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[address] TIA (transient ischemic attack), Uses walker, Vitamin D deficiency, Wears glasses, and Wellness examination. Social History:   reports that she quit smoking about 5 years ago. Her smoking use included cigarettes. She started smoking about 27 years ago. She has a 10.00 pack-year smoking history. She has never used smokeless tobacco. She reports that she does not drink alcohol and does not use drugs. Family History:   Family History   Problem Relation Age of Onset    Stroke Mother     Diabetes Mother     Heart Disease Mother     High Blood Pressure Mother     Heart Disease Father     Heart Disease Brother     High Blood Pressure Brother     Heart Disease Maternal Grandmother     High Blood Pressure Sister        Vitals:  BP (!) 151/62   Pulse 61   Temp 97.2 °F (36.2 °C)   Resp 16   Ht 5' 3\" (1.6 m)   Wt 180 lb (81.6 kg)   SpO2 97%   BMI 31.89 kg/m²   Temp (24hrs), Av °F (36.7 °C), Min:97.2 °F (36.2 °C), Max:98.8 °F (37.1 °C)    No results for input(s): POCGLU in the last 72 hours. I/O(24Hr): Intake/Output Summary (Last 24 hours) at 2022 1503  Last data filed at 2022 1535  Gross per 24 hour   Intake --   Output 300 ml   Net -300 ml       Labs:  [unfilled]    Lab Results   Component Value Date/Time    SPECIAL NOT REPORTED 2021 09:58 AM     Lab Results   Component Value Date/Time    CULTURE NO GROWTH 5 DAYS 2022 07:40 PM       [unfilled]    Radiology:    XR ELBOW RIGHT (MIN 3 VIEWS)    Result Date: 2022  EXAMINATION: THREE XRAY VIEWS OF THE RIGHT ELBOW 2022 9:29 am COMPARISON: None. HISTORY: ORDERING SYSTEM PROVIDED HISTORY: fall, pain TECHNOLOGIST PROVIDED HISTORY: fall, pain Reason for Exam: fall, pain FINDINGS: No fracture. No dislocation. No sizable elbow joint effusion. No destructive or blastic lesion. Mild degenerative changes, best seen coronoid process, radial head and distal humeral epicondyles. Mineralization WNL. No fracture or dislocation.      XR WRIST LEFT (MIN 3 VIEWS)    Result Date: 8/18/2022  X-rays taken in clinic and preliminarily reviewed by me 8/17/22: 3 views of the left wrist demonstrate a comminuted, shortened significant dorsal angulation of the left fifth metacarpal neck/shaft fracture. Patient with evidence of old injury to the ulnar styloid. Volar plate is present from old distal radius injury    CT HEAD WO CONTRAST    Result Date: 9/6/2022  EXAMINATION: CT OF THE HEAD WITHOUT CONTRAST; CT OF THE FACE WITHOUT CONTRAST 9/6/2022 11:08 am; 9/6/2022 11:09 am TECHNIQUE: CT of the head was performed without the administration of intravenous contrast. Automated exposure control, iterative reconstruction, and/or weight based adjustment of the mA/kV was utilized to reduce the radiation dose to as low as reasonably achievable.; CT of the face was performed without the administration of intravenous contrast. Multiplanar reformatted images are provided for review. Automated exposure control, iterative reconstruction, and/or weight based adjustment of the mA/kV was utilized to reduce the radiation dose to as low as reasonably achievable. COMPARISON: 07/10/2022 HISTORY: ORDERING SYSTEM PROVIDED HISTORY: fall, head trauma, on anticoagulation FINDINGS: CT HEAD: BRAIN/VENTRICLES: There is no acute intracranial hemorrhage, mass effect or midline shift. No abnormal extra-axial fluid collection. The gray-white differentiation is maintained without evidence of an acute infarct. There is no evidence of hydrocephalus. Mild generalized parenchymal volume loss and mild chronic small vessel disease. CT FACIAL BONES: FACIAL BONES: The maxilla, pterygoid plates and zygomatic arches are intact. The mandible is intact. The mandibular condyles are normally situated with similar advanced degenerative change involving the right TMJ. The nasal bones and maxillary nasal processes are intact. ORBITS: The globes appear intact.   The extraocular muscles, optic nerve sheath complexes and lacrimal glands appear unremarkable. No retrobulbar hematoma or mass is seen. The orbital walls and rims are intact. Bilateral pseudophakia. SINUSES/MASTOIDS:  No acute fracture is seen. Sequelae of prior endoscopic sinus surgery with bilateral antrostomy and partial ethmoidectomies. Mild mucosal thickening involving floor of the maxillary sinuses with unchanged benign heterotopic ossification within the right maxillary sinus. SOFT TISSUES: No acute abnormality of the visualized skull or soft tissues. No acute intracranial abnormality. No acute traumatic injury of the facial bones. CT FACIAL BONES WO CONTRAST    Result Date: 9/6/2022  EXAMINATION: CT OF THE HEAD WITHOUT CONTRAST; CT OF THE FACE WITHOUT CONTRAST 9/6/2022 11:08 am; 9/6/2022 11:09 am TECHNIQUE: CT of the head was performed without the administration of intravenous contrast. Automated exposure control, iterative reconstruction, and/or weight based adjustment of the mA/kV was utilized to reduce the radiation dose to as low as reasonably achievable.; CT of the face was performed without the administration of intravenous contrast. Multiplanar reformatted images are provided for review. Automated exposure control, iterative reconstruction, and/or weight based adjustment of the mA/kV was utilized to reduce the radiation dose to as low as reasonably achievable. COMPARISON: 07/10/2022 HISTORY: ORDERING SYSTEM PROVIDED HISTORY: fall, head trauma, on anticoagulation FINDINGS: CT HEAD: BRAIN/VENTRICLES: There is no acute intracranial hemorrhage, mass effect or midline shift. No abnormal extra-axial fluid collection. The gray-white differentiation is maintained without evidence of an acute infarct. There is no evidence of hydrocephalus. Mild generalized parenchymal volume loss and mild chronic small vessel disease. CT FACIAL BONES: FACIAL BONES: The maxilla, pterygoid plates and zygomatic arches are intact. The mandible is intact.   The mandibular condyles are normally situated with similar advanced degenerative change involving the right TMJ. The nasal bones and maxillary nasal processes are intact. ORBITS: The globes appear intact. The extraocular muscles, optic nerve sheath complexes and lacrimal glands appear unremarkable. No retrobulbar hematoma or mass is seen. The orbital walls and rims are intact. Bilateral pseudophakia. SINUSES/MASTOIDS:  No acute fracture is seen. Sequelae of prior endoscopic sinus surgery with bilateral antrostomy and partial ethmoidectomies. Mild mucosal thickening involving floor of the maxillary sinuses with unchanged benign heterotopic ossification within the right maxillary sinus. SOFT TISSUES: No acute abnormality of the visualized skull or soft tissues. No acute intracranial abnormality. No acute traumatic injury of the facial bones. XR HIP 2-3 VW W PELVIS RIGHT    Result Date: 9/6/2022  EXAMINATION: ONE XRAY VIEW OF THE PELVIS AND TWO XRAY VIEWS RIGHT HIP 9/6/2022 9:29 am COMPARISON: CT 07/20/2022 HISTORY: ORDERING SYSTEM PROVIDED HISTORY: fall, pain TECHNOLOGIST PROVIDED HISTORY: fall, pain Reason for Exam: fall, pain FINDINGS: There is diffuse demineralization, which limits evaluation for acute fracture. The hip demonstrates normal alignment. No evidence of acute fracture. No focal osseus lesion. Pelvis is intact. Partially visualized postsurgical changes of the lumbar spine. Bowel gas obscures the sacrum and pubic symphysis. No acute osseous abnormality. Given the degree of osteopenia, nondisplaced fractures may be radiographically occult. If pain or concern for fracture persists, consider MR imaging. Physical Examination:        Physical Exam  Constitutional:       Appearance: Normal appearance. She is not toxic-appearing. HENT:      Head: Normocephalic and atraumatic. Nose: No congestion or rhinorrhea. Eyes:      Extraocular Movements: Extraocular movements intact.       Conjunctiva/sclera: Conjunctivae normal.      Pupils: Pupils are equal, round, and reactive to light. Cardiovascular:      Rate and Rhythm: Normal rate and regular rhythm. Pulses: Normal pulses. Heart sounds: Normal heart sounds. No murmur heard. No friction rub. No gallop. Pulmonary:      Effort: Pulmonary effort is normal. No respiratory distress. Breath sounds: Normal breath sounds. No wheezing or rales. Abdominal:      General: Abdomen is flat. Bowel sounds are normal. There is no distension. Tenderness: There is no abdominal tenderness. There is no guarding. Musculoskeletal:      Right lower leg: No edema. Left lower leg: No edema. Skin:     Capillary Refill: Capillary refill takes less than 2 seconds. Coloration: Skin is not jaundiced or pale. Findings: Bruising present. Neurological:      General: No focal deficit present. Mental Status: She is alert and oriented to person, place, and time. Sensory: No sensory deficit. Motor: Weakness present. Psychiatric:         Mood and Affect: Mood normal.         Assessment:        Primary Problem  Fall as cause of accidental injury in home as place of occurrence, initial encounter    Active Hospital Problems    Diagnosis Date Noted    Fall as cause of accidental injury in home as place of occurrence, initial encounter [W19. Colon Areola, D71.151] 09/06/2022     Priority: Medium       Plan:        Investigation of fall  -X-ray of hip and pelvis on right: no acute osseous abnormality  -X-ray of right elbow was negative  -CBC was unremarkable  -BUN was elevated at 25, baseline is 18  -CT head without contrast showed no acute intracranial abnormality. -CT facial bones without contrast showed no acute intracranial abnormality.   -Fall precautions in place  -Neurovascular checks        2. Hypertension  -BP today: 151/62  -Continue Coreg 12.5 mg po daily  -Amlodipine 5 mg on hold         3.  Hyperlipidemia  -Continue lipitor 40 mg po

## 2022-09-08 LAB
ABSOLUTE EOS #: 0.5 K/UL (ref 0–0.4)
ABSOLUTE LYMPH #: 1.8 K/UL (ref 1–4.8)
ABSOLUTE MONO #: 0.5 K/UL (ref 0.1–1.3)
ANION GAP SERPL CALCULATED.3IONS-SCNC: 7 MMOL/L (ref 9–17)
BASOPHILS # BLD: 1 % (ref 0–2)
BASOPHILS ABSOLUTE: 0.1 K/UL (ref 0–0.2)
BUN BLDV-MCNC: 29 MG/DL (ref 8–23)
CALCIUM SERPL-MCNC: 8.9 MG/DL (ref 8.6–10.4)
CHLORIDE BLD-SCNC: 104 MMOL/L (ref 98–107)
CO2: 28 MMOL/L (ref 20–31)
CREAT SERPL-MCNC: 0.74 MG/DL (ref 0.5–0.9)
EOSINOPHILS RELATIVE PERCENT: 9 % (ref 0–4)
GFR AFRICAN AMERICAN: >60 ML/MIN
GFR NON-AFRICAN AMERICAN: >60 ML/MIN
GFR SERPL CREATININE-BSD FRML MDRD: ABNORMAL ML/MIN/{1.73_M2}
GLUCOSE BLD-MCNC: 119 MG/DL (ref 70–99)
HCT VFR BLD CALC: 35.5 % (ref 36–46)
HEMOGLOBIN: 11.9 G/DL (ref 12–16)
LYMPHOCYTES # BLD: 34 % (ref 24–44)
MCH RBC QN AUTO: 30.6 PG (ref 26–34)
MCHC RBC AUTO-ENTMCNC: 33.7 G/DL (ref 31–37)
MCV RBC AUTO: 90.9 FL (ref 80–100)
MONOCYTES # BLD: 9 % (ref 1–7)
PDW BLD-RTO: 14.6 % (ref 11.5–14.9)
PLATELET # BLD: 250 K/UL (ref 150–450)
PMV BLD AUTO: 7.1 FL (ref 6–12)
POTASSIUM SERPL-SCNC: 4 MMOL/L (ref 3.7–5.3)
RBC # BLD: 3.9 M/UL (ref 4–5.2)
SEG NEUTROPHILS: 47 % (ref 36–66)
SEGMENTED NEUTROPHILS ABSOLUTE COUNT: 2.6 K/UL (ref 1.3–9.1)
SODIUM BLD-SCNC: 139 MMOL/L (ref 135–144)
WBC # BLD: 5.4 K/UL (ref 3.5–11)

## 2022-09-08 PROCEDURE — 85025 COMPLETE CBC W/AUTO DIFF WBC: CPT

## 2022-09-08 PROCEDURE — 6370000000 HC RX 637 (ALT 250 FOR IP)

## 2022-09-08 PROCEDURE — 2580000003 HC RX 258

## 2022-09-08 PROCEDURE — 36415 COLL VENOUS BLD VENIPUNCTURE: CPT

## 2022-09-08 PROCEDURE — 97530 THERAPEUTIC ACTIVITIES: CPT

## 2022-09-08 PROCEDURE — 97116 GAIT TRAINING THERAPY: CPT

## 2022-09-08 PROCEDURE — 6370000000 HC RX 637 (ALT 250 FOR IP): Performed by: NURSE PRACTITIONER

## 2022-09-08 PROCEDURE — 99232 SBSQ HOSP IP/OBS MODERATE 35: CPT | Performed by: INTERNAL MEDICINE

## 2022-09-08 PROCEDURE — 1200000000 HC SEMI PRIVATE

## 2022-09-08 PROCEDURE — 97110 THERAPEUTIC EXERCISES: CPT

## 2022-09-08 PROCEDURE — 80048 BASIC METABOLIC PNL TOTAL CA: CPT

## 2022-09-08 RX ADMIN — HYDROCODONE BITARTRATE AND ACETAMINOPHEN 1 TABLET: 5; 325 TABLET ORAL at 19:28

## 2022-09-08 RX ADMIN — BUSPIRONE HYDROCHLORIDE 5 MG: 5 TABLET ORAL at 15:36

## 2022-09-08 RX ADMIN — FENOFIBRATE 160 MG: 160 TABLET ORAL at 08:03

## 2022-09-08 RX ADMIN — Medication 6 MG: at 21:32

## 2022-09-08 RX ADMIN — SODIUM CHLORIDE, PRESERVATIVE FREE 10 ML: 5 INJECTION INTRAVENOUS at 21:44

## 2022-09-08 RX ADMIN — FERROUS SULFATE TAB 325 MG (65 MG ELEMENTAL FE) 325 MG: 325 (65 FE) TAB at 08:02

## 2022-09-08 RX ADMIN — CARVEDILOL 12.5 MG: 12.5 TABLET, FILM COATED ORAL at 08:02

## 2022-09-08 RX ADMIN — APIXABAN 5 MG: 5 TABLET, FILM COATED ORAL at 21:32

## 2022-09-08 RX ADMIN — HYDROCODONE BITARTRATE AND ACETAMINOPHEN 1 TABLET: 5; 325 TABLET ORAL at 00:17

## 2022-09-08 RX ADMIN — PRAMIPEXOLE DIHYDROCHLORIDE 0.5 MG: 0.25 TABLET ORAL at 21:42

## 2022-09-08 RX ADMIN — HYDROCODONE BITARTRATE AND ACETAMINOPHEN 1 TABLET: 5; 325 TABLET ORAL at 08:02

## 2022-09-08 RX ADMIN — BUSPIRONE HYDROCHLORIDE 5 MG: 5 TABLET ORAL at 21:32

## 2022-09-08 RX ADMIN — CITALOPRAM HYDROBROMIDE 20 MG: 20 TABLET ORAL at 08:03

## 2022-09-08 RX ADMIN — SODIUM CHLORIDE, PRESERVATIVE FREE 10 ML: 5 INJECTION INTRAVENOUS at 08:02

## 2022-09-08 RX ADMIN — FUROSEMIDE 20 MG: 20 TABLET ORAL at 08:03

## 2022-09-08 RX ADMIN — BUSPIRONE HYDROCHLORIDE 5 MG: 5 TABLET ORAL at 08:03

## 2022-09-08 RX ADMIN — PANTOPRAZOLE SODIUM 40 MG: 40 TABLET, DELAYED RELEASE ORAL at 05:48

## 2022-09-08 RX ADMIN — CARVEDILOL 12.5 MG: 12.5 TABLET, FILM COATED ORAL at 21:32

## 2022-09-08 RX ADMIN — APIXABAN 5 MG: 5 TABLET, FILM COATED ORAL at 08:02

## 2022-09-08 RX ADMIN — ATORVASTATIN CALCIUM 20 MG: 20 TABLET, FILM COATED ORAL at 21:32

## 2022-09-08 ASSESSMENT — ENCOUNTER SYMPTOMS
WHEEZING: 0
CHOKING: 0
ABDOMINAL PAIN: 0
SHORTNESS OF BREATH: 0
VOMITING: 0
COUGH: 0
COLOR CHANGE: 1
ABDOMINAL DISTENTION: 0
NAUSEA: 0
CONSTIPATION: 0
BACK PAIN: 0
DIARRHEA: 0
RHINORRHEA: 0
CHEST TIGHTNESS: 0

## 2022-09-08 ASSESSMENT — PAIN SCALES - WONG BAKER: WONGBAKER_NUMERICALRESPONSE: 0

## 2022-09-08 ASSESSMENT — PAIN DESCRIPTION - LOCATION
LOCATION: HAND
LOCATION: HAND;HIP
LOCATION: BACK;HAND

## 2022-09-08 ASSESSMENT — PAIN SCALES - GENERAL
PAINLEVEL_OUTOF10: 6

## 2022-09-08 ASSESSMENT — PAIN DESCRIPTION - ORIENTATION
ORIENTATION: RIGHT;LEFT
ORIENTATION: LEFT

## 2022-09-08 ASSESSMENT — PAIN DESCRIPTION - DESCRIPTORS: DESCRIPTORS: DISCOMFORT

## 2022-09-08 NOTE — PROGRESS NOTES
[id number]    PROGRESS NOTE             9/8/2022    9:45 AM    Name:   Alton Nagel  MRN:     [id number]     Acct:      [de-identified]   Room:   2049/2049-01   Day:  2  Admit Date:  9/6/2022  8:51 AM    PCP:  Kristen Luo MD  Code Status:  Full Code    Subjective:     C/C:   Chief Complaint   Patient presents with    Fall    Hip Pain     Right     Interval History Status: improved. The patient was seen and examined at the bedside. No acute overnight events as per patient and RN. Patient mentioned good sleep and good appetite. /68. HR 59. Discharge planned for today. Brief History:     The patient is a 71 y.o. Non- / non  female with a history of stroke, CHFpEF, chronic DVT in bilateral lower limbs on Eliquis, obesity, recurrent right leg cellulitis, MI, hypertension, hyperlipidemia, restless leg syndrome, depression presents to the clinic after suffering a fall 2 days ago. The patient was ambulating around her home when she lost balance and fell down on her right elbow and right head. The patient normally uses a walker and wheelchair, however, it unclear if she was using the walker at the time of the fall. She denies any dizziness, light-headedness, and vision changes at the time of the fall. The fall was unwitnessed. She denies loosing consciousness. The patient reports several falls over the past year. She lives at home with her , however he is ill  and unable to help her at home. The patient reports right elbow bruising and pain and right periorbital/cheek bruising and pain, urinary frequency, urinary urgency, chronic cough, fatigue, and left arm weakness (chronic since her stroke). She denies chest pain, shortness of breath, cough, chest pain, abdominal pain, nausea, vomiting, constipation and diarrhea.       The patient was recently discharged from [facility] on 7/21/22 where she was admitted for management of fracture to body of sternum following a fall and right leg cellulitis. She received IV antibiotics and was discharged to an extended care facility. During her stay, the patient fell and sustained a left metacarpal neck fracture. She follows up with [facility] surgeons for treatment of the fracture. The ED, the patient was vitally stable, however BP was elevated (195/66). X-ray of hip and pelvis on right showed no acute osseous abnormality. X-ray of right elbow was negative. CBC was unremarkable. CT head without contrast showed no acute intracranial abnormality. CT facial bones without contrast showed no acute intracranial abnormality. Glucose was 120. The patient was admitted for investigation of fall. Review of Systems:     Review of Systems   Constitutional:  Negative for activity change, chills, diaphoresis, fatigue and fever. HENT:  Negative for congestion and rhinorrhea. Respiratory:  Negative for cough, choking, chest tightness, shortness of breath and wheezing. Gastrointestinal:  Negative for abdominal distention, abdominal pain, constipation, diarrhea, nausea and vomiting. Endocrine: Negative for cold intolerance and heat intolerance. Genitourinary:  Negative for dysuria, frequency and urgency. Musculoskeletal:  Negative for arthralgias, back pain and myalgias. Skin:  Positive for color change. Neurological:  Negative for dizziness, tremors, syncope, weakness, light-headedness and headaches. Psychiatric/Behavioral:  Negative for agitation and confusion. Medications: Allergies:     Allergies   Allergen Reactions    Bactrim [Sulfamethoxazole-Trimethoprim] Other (See Comments)     confusion    Codeine Itching    Diazepam Other (See Comments)    Meperidine Hcl Other (See Comments)    Seasonal        Current Meds:   Scheduled Meds:    apixaban  5 mg Oral BID    busPIRone  5 mg Oral TID    carvedilol  12.5 mg Oral BID    citalopram  20 mg 10.00 pack-year smoking history. She has never used smokeless tobacco. She reports that she does not drink alcohol and does not use drugs. Family History:   Family History   Problem Relation Age of Onset    Stroke Mother     Diabetes Mother     Heart Disease Mother     High Blood Pressure Mother     Heart Disease Father     Heart Disease Brother     High Blood Pressure Brother     Heart Disease Maternal Grandmother     High Blood Pressure Sister        Vitals:  BP (!) 149/68   Pulse 59   Temp 97.7 °F (36.5 °C)   Resp 16   Ht 5' 3\" (1.6 m)   Wt 180 lb (81.6 kg)   SpO2 95%   BMI 31.89 kg/m²   Temp (24hrs), Av.8 °F (36.6 °C), Min:97.2 °F (36.2 °C), Max:98.2 °F (36.8 °C)    No results for input(s): POCGLU in the last 72 hours. I/O(24Hr): Intake/Output Summary (Last 24 hours) at 2022 0945  Last data filed at 2022 2576  Gross per 24 hour   Intake --   Output 1500 ml   Net -1500 ml       Labs:  [unfilled]    Lab Results   Component Value Date/Time    SPECIAL NOT REPORTED 2021 09:58 AM     Lab Results   Component Value Date/Time    CULTURE NO GROWTH 5 DAYS 2022 07:40 PM       [unfilled]    Radiology:    XR ELBOW RIGHT (MIN 3 VIEWS)    Result Date: 2022  EXAMINATION: THREE XRAY VIEWS OF THE RIGHT ELBOW 2022 9:29 am COMPARISON: None. HISTORY: ORDERING SYSTEM PROVIDED HISTORY: fall, pain TECHNOLOGIST PROVIDED HISTORY: fall, pain Reason for Exam: fall, pain FINDINGS: No fracture. No dislocation. No sizable elbow joint effusion. No destructive or blastic lesion. Mild degenerative changes, best seen coronoid process, radial head and distal humeral epicondyles. Mineralization WNL. No fracture or dislocation. XR WRIST LEFT (MIN 3 VIEWS)    Result Date: 2022  X-rays taken in clinic and preliminarily reviewed by me 22: 3 views of the left wrist demonstrate a comminuted, shortened significant dorsal angulation of the left fifth metacarpal neck/shaft fracture. Patient with evidence of old injury to the ulnar styloid. Volar plate is present from old distal radius injury    CT HEAD WO CONTRAST    Result Date: 9/6/2022  EXAMINATION: CT OF THE HEAD WITHOUT CONTRAST; CT OF THE FACE WITHOUT CONTRAST 9/6/2022 11:08 am; 9/6/2022 11:09 am TECHNIQUE: CT of the head was performed without the administration of intravenous contrast. Automated exposure control, iterative reconstruction, and/or weight based adjustment of the mA/kV was utilized to reduce the radiation dose to as low as reasonably achievable.; CT of the face was performed without the administration of intravenous contrast. Multiplanar reformatted images are provided for review. Automated exposure control, iterative reconstruction, and/or weight based adjustment of the mA/kV was utilized to reduce the radiation dose to as low as reasonably achievable. COMPARISON: 07/10/2022 HISTORY: ORDERING SYSTEM PROVIDED HISTORY: fall, head trauma, on anticoagulation FINDINGS: CT HEAD: BRAIN/VENTRICLES: There is no acute intracranial hemorrhage, mass effect or midline shift. No abnormal extra-axial fluid collection. The gray-white differentiation is maintained without evidence of an acute infarct. There is no evidence of hydrocephalus. Mild generalized parenchymal volume loss and mild chronic small vessel disease. CT FACIAL BONES: FACIAL BONES: The maxilla, pterygoid plates and zygomatic arches are intact. The mandible is intact. The mandibular condyles are normally situated with similar advanced degenerative change involving the right TMJ. The nasal bones and maxillary nasal processes are intact. ORBITS: The globes appear intact. The extraocular muscles, optic nerve sheath complexes and lacrimal glands appear unremarkable. No retrobulbar hematoma or mass is seen. The orbital walls and rims are intact. Bilateral pseudophakia. SINUSES/MASTOIDS:  No acute fracture is seen.   Sequelae of prior endoscopic sinus surgery with bilateral antrostomy and partial ethmoidectomies. Mild mucosal thickening involving floor of the maxillary sinuses with unchanged benign heterotopic ossification within the right maxillary sinus. SOFT TISSUES: No acute abnormality of the visualized skull or soft tissues. No acute intracranial abnormality. No acute traumatic injury of the facial bones. CT FACIAL BONES WO CONTRAST    Result Date: 9/6/2022  EXAMINATION: CT OF THE HEAD WITHOUT CONTRAST; CT OF THE FACE WITHOUT CONTRAST 9/6/2022 11:08 am; 9/6/2022 11:09 am TECHNIQUE: CT of the head was performed without the administration of intravenous contrast. Automated exposure control, iterative reconstruction, and/or weight based adjustment of the mA/kV was utilized to reduce the radiation dose to as low as reasonably achievable.; CT of the face was performed without the administration of intravenous contrast. Multiplanar reformatted images are provided for review. Automated exposure control, iterative reconstruction, and/or weight based adjustment of the mA/kV was utilized to reduce the radiation dose to as low as reasonably achievable. COMPARISON: 07/10/2022 HISTORY: ORDERING SYSTEM PROVIDED HISTORY: fall, head trauma, on anticoagulation FINDINGS: CT HEAD: BRAIN/VENTRICLES: There is no acute intracranial hemorrhage, mass effect or midline shift. No abnormal extra-axial fluid collection. The gray-white differentiation is maintained without evidence of an acute infarct. There is no evidence of hydrocephalus. Mild generalized parenchymal volume loss and mild chronic small vessel disease. CT FACIAL BONES: FACIAL BONES: The maxilla, pterygoid plates and zygomatic arches are intact. The mandible is intact. The mandibular condyles are normally situated with similar advanced degenerative change involving the right TMJ. The nasal bones and maxillary nasal processes are intact. ORBITS: The globes appear intact.   The extraocular muscles, optic nerve sheath complexes and lacrimal glands appear unremarkable. No retrobulbar hematoma or mass is seen. The orbital walls and rims are intact. Bilateral pseudophakia. SINUSES/MASTOIDS:  No acute fracture is seen. Sequelae of prior endoscopic sinus surgery with bilateral antrostomy and partial ethmoidectomies. Mild mucosal thickening involving floor of the maxillary sinuses with unchanged benign heterotopic ossification within the right maxillary sinus. SOFT TISSUES: No acute abnormality of the visualized skull or soft tissues. No acute intracranial abnormality. No acute traumatic injury of the facial bones. XR HIP 2-3 VW W PELVIS RIGHT    Result Date: 9/6/2022  EXAMINATION: ONE XRAY VIEW OF THE PELVIS AND TWO XRAY VIEWS RIGHT HIP 9/6/2022 9:29 am COMPARISON: CT 07/20/2022 HISTORY: ORDERING SYSTEM PROVIDED HISTORY: fall, pain TECHNOLOGIST PROVIDED HISTORY: fall, pain Reason for Exam: fall, pain FINDINGS: There is diffuse demineralization, which limits evaluation for acute fracture. The hip demonstrates normal alignment. No evidence of acute fracture. No focal osseus lesion. Pelvis is intact. Partially visualized postsurgical changes of the lumbar spine. Bowel gas obscures the sacrum and pubic symphysis. No acute osseous abnormality. Given the degree of osteopenia, nondisplaced fractures may be radiographically occult. If pain or concern for fracture persists, consider MR imaging. Physical Examination:        Physical Exam  Constitutional:       General: She is not in acute distress. Appearance: Normal appearance. HENT:      Nose: No congestion or rhinorrhea. Eyes:      Extraocular Movements: Extraocular movements intact. Conjunctiva/sclera: Conjunctivae normal.      Pupils: Pupils are equal, round, and reactive to light. Cardiovascular:      Rate and Rhythm: Normal rate and regular rhythm. Pulses: Normal pulses. Heart sounds: Normal heart sounds. No murmur heard.     No friction rub. No gallop. Pulmonary:      Effort: Pulmonary effort is normal. No respiratory distress. Breath sounds: Normal breath sounds. No wheezing or rales. Abdominal:      General: Abdomen is flat. Bowel sounds are normal. There is no distension. Tenderness: There is no abdominal tenderness. There is no guarding. Musculoskeletal:      Right lower leg: No edema. Left lower leg: No edema. Skin:     Capillary Refill: Capillary refill takes less than 2 seconds. Coloration: Skin is not jaundiced or pale. Findings: Bruising present. Neurological:      General: No focal deficit present. Mental Status: She is alert and oriented to person, place, and time. Cranial Nerves: No cranial nerve deficit. Sensory: No sensory deficit. Psychiatric:         Mood and Affect: Mood normal.         Assessment:        Primary Problem  Fall as cause of accidental injury in home as place of occurrence, initial encounter    Active Hospital Problems    Diagnosis Date Noted    Fall as cause of accidental injury in home as place of occurrence, initial encounter [W19. Justine [place], C12.626] 09/06/2022     Priority: Medium       Plan:        Investigation of fall  -X-ray of hip and pelvis on right: no acute osseous abnormality  -X-ray of right elbow was negative  -CBC was unremarkable  -BUN was elevated at 25, baseline is 18  -CT head without contrast showed no acute intracranial abnormality. -CT facial bones without contrast showed no acute intracranial abnormality.   -Fall precautions in place  -Neurovascular checks        2. Hypertension  -BP today: 149/68  -Continue Coreg 12.5 mg po daily  -Continue Lasix 20 mg po daily   -Amlodipine 5 mg on hold         3. Hyperlipidemia  -Continue lipitor 40 mg po daily  -Continue fenofibrate 160 mg po daily         4. Depression  -Continue Buspar 5 mg po TID  -Continue Celexa 20 mg po daily        5.  CHFpEF  -Echo 4/8/22: Normal LVEF (>55%), Grade I (mild) LV diastolic dysfunction. Mild TR  -Continue Lasix 20 mg po daily        6. Restless leg syndrome  -Continue pramipexole 0.5 mg po nightly         7. Chronic DVT of bilateral lower limbs  -Continue Eliquis 5 mg po BID  -bilateral compression stockings         PT/OT consulted  Diet: Normal adult diet; low sodium  Code: Full code  DVT prophylaxis: Eliquis 5 mg po BID  Social work consulted     Glenard Schilder, MD  9/8/2022  9:45 AM   Attending Physician Statement  I have discussed the care of Yamil Messina and I have examined the patient myselft and taken ros and hpi , including pertinent history and exam findings,  with the resident. I have reviewed the key elements of all parts of the encounter with the resident. I agree with the assessment, plan and orders as documented by the resident. Spent 35 minutes in reviewing data/medicines/talking to patient/family,  explaining and answering all the questions.     43-year-old female admitted with fall, metabolic encephalopathy, hypertension, underlying depression, chronic bilateral lower extremity DVT on Eliquis, history of stroke recurrent lower extremity cellulitis, x-rays do not show any fracture, PT OT,  SNF placement    Electronically signed by Baldomero Yi MD

## 2022-09-08 NOTE — CARE COORDINATION
AMELIA notified that patient is close to being ready for d/c.     AMELIA left message with Fabiola zhou to see if the [facility] can accept. AMELIA will continue to follow.        Electronically signed by Milton Jiménez on 9/8/22 at 8:36 AM EDT

## 2022-09-08 NOTE — DISCHARGE INSTR - COC
recurring cysts    LUMBAR FUSION N/A 02/10/2020    LUMBAR L4-5 POSTERIOR  DECOMPRESSION INSTRUMENTATION FUSION WCEMENT AUGMENTATION/ performed by Jose Rosales MD at [facility] N/A 06/17/2020    L5-S1 PLIF L4-L5 REVISION performed by Jose Rosales MD at [facility]  08/14/2014    FESS    OVARY REMOVAL  1970    UNILATERAL due to cyst    OVARY REMOVAL  1971    partial, due to cyst    SINUS SURGERY  2004    UPPER GASTROINTESTINAL ENDOSCOPY N/A 05/31/2019    EGD ESOPHAGOGASTRODUODENOSCOPY performed by Eugenie Alston MD at [facility]. [facility] N/A 08/05/2019    EGD BIOPSY performed by Ambreen Pierre MD at [facility]. [facility] N/A 08/23/2019    EGD BIOPSY performed by Eugenie Alston MD at [address] Left 03/05/2019    WRIST OPEN REDUCTION INTERNAL FIXATION performed by Germania De La Paz MD at [address] [place] [address]       Immunization History:   Immunization History   Administered Date(s) Administered    COVID-19, PFIZER PURPLE top, DILUTE for use, (age 15 y+), 30mcg/0.3mL 03/18/2021, 04/08/2021    Influenza, High Dose (Fluzone 65 yrs and older) 11/17/2017    PPD Test 03/25/2020, 04/04/2020    Pneumococcal Conjugate 13-valent ([id number]) 05/23/2017    Pneumococcal Polysaccharide ([id number]) 05/10/2016       Active Problems:  Patient Active Problem List   Diagnosis Code    Other specified disorders of rotator cuff syndrome of shoulder and allied disorders M75.100    Diverticulosis of large intestine K57.30    Intestinal or peritoneal adhesions with obstruction (postoperative) (postinfection) (HCC) K56.50    Restless legs syndrome (RLS) G25.81    GERD (gastroesophageal reflux disease) K21.9    Essential hypertension I10    Mixed hyperlipidemia E78.2    Other abnormal glucose R73.09    Atherosclerosis I70.90    Allergic rhinitis J30.9    Vitamin D deficiency E55.9    Depression F32. A Degenerative joint disease (DJD) of hip M16.9    Peripheral edema R60.9    Injury of foot, left O17.109W    Facial droop R29.810    CVA (cerebral vascular accident) ([facility] [address] I63.9    Bradycardia R00.1    Ataxia R27.0    Aphasia R47.01    TIA (transient ischemic attack) G45.9    Need for prophylactic vaccination and inoculation against cholera alone Z23    Osteopenia M85.80    Lumbago M54.50    Meralgia paresthetica of right side G57.11    Lumbar degenerative disc disease M51.36    Spondylosis of lumbar region without myelopathy or radiculopathy M47.816    Blood poisoning [id number]    Cellulitis of left lower extremity L03.116    Encounter for medication monitoring Z51.81    Deep vein thrombosis (DVT) of right lower extremity (HCC) I82.401    Chronic deep vein thrombosis (DVT) of proximal vein of both lower extremities (HCC) I82.5Y3    Chronic diastolic heart failure (HCC) I50.32    Neurogenic claudication due to lumbar spinal stenosis M48.062    Sacroiliitis (HCC) M46.1    Stage 3 chronic kidney disease (HCC) N18.30    Type 2 diabetes mellitus with circulatory disorder, without long-term current use of insulin (HCC) E11.59    Chronic deep vein thrombosis (DVT) of popliteal vein of right lower extremity (HCC) I82.531    Closed fracture of left wrist S62.102A    B12 deficiency E53.8    Iron deficiency anemia secondary to inadequate dietary iron intake D50.8    Closed head injury S09.90XA    Scalp laceration S01. 01XA    Abnormal finding on imaging R93.89    Other irritable bowel syndrome K58.8    Frequent falls R29.6    Double vision H53.2    Muscle soreness M79.10    Peptic ulcer K27.9    Melena K92.1    PUD (peptic ulcer disease) K27.9    Absolute anemia D64.9    Acute blood loss anemia D62    Acute renal failure (HCC) N17.9    Clostridium difficile infection A49.8    Acquired spondylolisthesis M43.10    Spinal stenosis of lumbar region with neurogenic claudication M48.062    Acute deep vein thrombosis (DVT) of proximal vein of left lower extremity ([facility]) I82.4Y2    Leg swelling M79.89    Back pain M54.9    Neurological disorder G98.8    Closed unstable burst fracture of fifth lumbar vertebra with routine healing S32.052D    Slow transit constipation K59.01    Major depressive disorder, recurrent, moderate ([facility]) F33.1    Acute deep vein thrombosis (DVT) of femoral vein of left lower extremity ([facility]) I82.412    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, 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.2    Acute deep vein thrombosis (DVT) of right femoral vein ([facility]) I82.411    S/P cervical spinal fusion Z98.1    Gait instability R26.81    Cervical myelopathy ([facility]) G95.9    Cellulitis of both lower extremities L03.115, L03.116    Fracture of body of sternum, initial encounter for open fracture S22.22XB    Nondisplaced fracture of sternal end of left clavicle, initial encounter for closed fracture S42.018A    Erysipelas of right lower extremity A46    Closed fracture of body of sternum S22.22XA    Calculus of gallbladder without cholecystitis without obstruction K80.20    Leukocytosis D72.829    Type 2 diabetes mellitus with stage 3 chronic kidney disease ([facility]) E11.22, N18.30    Allergy to sulfa drugs Z88.2    Bilateral cellulitis of lower leg L03.116, L03.115    Lymphedema of both lower extremities I89.0    Cerebral infarction, unspecified ([facility] [address] I63.9    Chronic embolism and thrombosis of unspecified deep veins of proximal lower extremity, bilateral ([facility]) I82.5Y3    Other specified disorders of bone density and structure, unspecified site M85.80    Repeated falls R29.6    Fall as cause of accidental injury in home as place of occurrence, initial encounter [address]19. Silvia Donovan, Y92.009       Isolation/Infection:   Isolation            No Isolation          Patient Infection Status       Infection Onset Added Last Indicated Last Indicated By Review Planned Expiration Resolved Resolved By    None active    Resolved    COVID-19 (Rule Out) 05/17/21 05/17/21 05/17/21 COVID-19 (Ordered)   05/18/21 Rule-Out Test Resulted    COVID-19 (Rule Out) 04/12/21 04/13/21 04/12/21 COVID-19 (Ordered)   04/14/21 Rule-Out Test Resulted    COVID-19 04/25/20 04/25/20 04/25/20 COVID-19   06/18/20 Rayna Mask, RN    COVID-19 (Rule Out) 04/25/20 04/25/20 04/25/20 COVID-19 (Ordered)   04/25/20 Rule-Out Test Resulted            Nurse Assessment:  Last Vital Signs: BP (!) 149/68   Pulse 59   Temp 97.7 °F (36.5 °C)   Resp 16   Ht 5' 3\" (1.6 m)   Wt 180 lb (81.6 kg)   SpO2 95%   BMI 31.89 kg/m²     Last documented pain score (0-10 scale): Pain Level: 6  Last Weight:   Wt Readings from Last 1 Encounters:   09/06/22 180 lb (81.6 kg)     Mental Status:  oriented and alert    IV Access:  - None    Nursing Mobility/ADLs:  Walking   Assisted  Transfer  Assisted  Bathing  Assisted  Dressing  Assisted  Toileting  Assisted  Feeding  Independent  Med Admin  Independent  Med Delivery   whole    Wound Care Documentation and Therapy:  Incision 08/24/22 Finger (Comment which one) Left;Dorsal (Active)   Dressing Status Clean;Dry; Intact 09/08/22 0800   Incision Cleansed Cleansed with saline 08/24/22 1231   Dressing/Treatment Ace wrap;Splint 09/08/22 0800   Incision Assessment Dry 09/08/22 0800   Drainage Amount None 09/08/22 0800   Odor None 09/08/22 0800   Myah-incision Assessment Intact 09/08/22 0800   Number of days: 15        Elimination:  Continence:    Bowel: Yes  Bladder: Yes  Urinary Catheter: None   Colostomy/Ileostomy/Ileal Conduit: No       Date of Last BM: ***    Intake/Output Summary (Last 24 hours) at 9/8/2022 1140  Last data filed at 9/8/2022 4457  Gross per 24 hour   Intake --   Output 1500 ml   Net -1500 ml     I/O last 3 completed shifts:  In: -   Out: 1300 [Urine:1300]    Safety Concerns:     History of Falls (last 30 days) and At Risk for Falls    Impairments/Disabilities:      None    Nutrition Therapy:  Current Nutrition Therapy:   - Oral Diet:  General    Routes of Feeding: Oral  Liquids: No Restrictions  Daily Fluid Restriction: no  Last Modified Barium Swallow with Video (Video Swallowing Test): not done    Treatments at the Time of Hospital Discharge:   Respiratory Treatments:   Oxygen Therapy:  is not on home oxygen therapy. Ventilator:    - No ventilator support    Rehab Therapies: Physical Therapy and Occupational Therapy  Weight Bearing Status/Restrictions: No weight bearing restrictions  Other Medical Equipment (for information only, NOT a DME order): Other Treatments: skilled nursing assessment, medication education and monitoring    Patient's personal belongings (please select all that are sent with patient):  ***    RN SIGNATURE:  Electronically signed by Dave Hollis RN on 9/10/22 at 8:43 AM EDT    CASE MANAGEMENT/SOCIAL WORK SECTION    Inpatient Status Date: 9/6/22    Readmission Risk Assessment Score:  Readmission Risk              Risk of Unplanned Readmission:  30           Discharging to Facility/ [facility] [address] Cast[address], [address] [facility] [address]  [phone]  [phone]      Please refer patient to [facility] when discharged from your facility for home health services. [address]  Phone: [phone]  Fax:  [phone]    / signature: Electronically signed by Valeri Andrew RN on 9/8/22 at 3:19 PM EDT    PHYSICIAN SECTION    Prognosis: Good    Condition at Discharge: Stable    Rehab Potential (if transferring to Rehab): Fair    Recommended Labs or Other Treatments After Discharge:     Physician Certification: I certify the above information and transfer of Wendi Graves  is necessary for the continuing treatment of the diagnosis listed and that she requires [facility] for greater 30 days.      Update Admission H&P: No change in H&P    PHYSICIAN SIGNATURE:  Electronically signed by Jaziel Sifuentes MD on 9/8/22 at 11:40 AM EDT

## 2022-09-08 NOTE — PLAN OF CARE
Problem: Pain  Goal: Verbalizes/displays adequate comfort level or baseline comfort level  9/8/2022 1342 by Lydia Olsen RN  Outcome: Progressing     Problem: Safety - Adult  Goal: Free from fall injury  9/8/2022 1342 by Lydia Olsen RN  Outcome: Progressing     Problem: Chronic Conditions and Co-morbidities  Goal: Patient's chronic conditions and co-morbidity symptoms are monitored and maintained or improved  9/8/2022 1342 by Lydia Olsen RN  Outcome: Progressing Passed

## 2022-09-08 NOTE — PLAN OF CARE
Problem: Discharge Planning  Goal: Discharge to home or other facility with appropriate resources  9/7/2022 2348 by Una Johnson RN  Outcome: Progressing     Problem: Pain  Goal: Verbalizes/displays adequate comfort level or baseline comfort level  9/7/2022 2348 by Una Johnson RN  Outcome: Progressing  Flowsheets (Taken 9/7/2022 2348)  Verbalizes/displays adequate comfort level or baseline comfort level:   Encourage patient to monitor pain and request assistance   Assess pain using appropriate pain scale   Administer analgesics based on type and severity of pain and evaluate response   Implement non-pharmacological measures as appropriate and evaluate response   Consider cultural and social influences on pain and pain management   Notify Licensed Independent Practitioner if interventions unsuccessful or patient reports new pain  Note: Pt verbalizes adequate management of pain with interventions implemented this shift.      Problem: Safety - Adult  Goal: Free from fall injury  9/7/2022 2348 by Una Johnson RN  Outcome: Progressing     Problem: Chronic Conditions and Co-morbidities  Goal: Patient's chronic conditions and co-morbidity symptoms are monitored and maintained or improved  9/7/2022 2348 by Una Johnson RN  Outcome: Progressing

## 2022-09-08 NOTE — PROGRESS NOTES
Occupational Therapy  Facility/Department: [facility] MED SURG  Daily Treatment Note  NAME: Harish Farr  : 1950  MRN: [id number]    Date of Service: 2022    Discharge Recommendations:  Patient would benefit from continued therapy after discharge  OT Equipment Recommendations  Other: TBD      Patient Diagnosis(es): The encounter diagnosis was Generalized weakness. Assessment    Activity Tolerance: Patient tolerated treatment well  Discharge Recommendations: Patient would benefit from continued therapy after discharge  Other: TBD      Plan   Plan  Times per Week: 4-6  Current Treatment Recommendations: Strengthening;ROM;Balance training;Functional mobility training; Endurance training;Cognitive reorientation;Pain management; Safety education & training;Patient/Caregiver education & training;Equipment evaluation, education, & procurement;Self-Care / ADL;Cognitive/Perceptual training;Coordination training     Restrictions  Restrictions/Precautions  Restrictions/Precautions: Fall Risk;General Precautions; Up as Tolerated  Required Braces or Orthoses?: No  Implants present? : Metal implants (Wilfredo in back, wilfredo in L wrist from breaking it)  Position Activity Restriction  Other position/activity restrictions: Pt in splint LUE from closed reduction pinning of fifth metacarpal bone. Questionable WB status. Subjective   Subjective  Subjective: pt lying in bed upon arrival and agreeable for therapy. pt states that she sleeps in hospital bed at home. Orientation  Overall Orientation Status: Within Functional Limits  Pain: pt c/o posterior headache but does not rate  Cognition  Overall Cognitive Status: Exceptions  Arousal/Alertness: Appropriate responses to stimuli  Following Commands: Follows one step commands consistently; Follows one step commands with increased time  Attention Span: Attends with cues to redirect  Safety Judgement: Decreased awareness of need for assistance;Decreased awareness of need for safety  Problem Solving: Assistance required to generate solutions;Assistance required to implement solutions;Assistance required to identify errors made;Assistance required to correct errors made  Insights: Decreased awareness of deficits  Initiation: Requires cues for some  Sequencing: Requires cues for some        Objective    Vitals  Vitals  BP Location: Right upper arm  O2 Device: None (Room air)  Bed Mobility Training  Bed Mobility Training: Yes  Overall Level of Assistance: Minimum assistance; Additional time  Interventions: Verbal cues; Safety awareness training (pt states rthat she isn't supposed to put any weight through LUE but doesn't follow precautions during bed mobility. pt educated on utilizing RUE only for bed mobility. pt demo P return.)  Rolling: Stand-by assistance  Supine to Sit: Minimum assistance  Sit to Supine: Minimum assistance  Scooting: Stand-by assistance; Additional time  Duration:  (pt tolerates sitting EOB unsupported x 15 minutes)     ADL  Feeding: Setup  Grooming: Setup  UE Bathing: Stand by assistance  LE Bathing: Moderate assistance  UE Dressing: Stand by assistance  LE Dressing: Moderate assistance  Toileting: Moderate assistance  Additional Comments: ADL scores based on clinical reasoning and skilled observation unless otherwise noted. Pt currently limited by safety awareness, balance, pain, fatigue, and activity tolerance impacting IND with self care tasks. pt educated on nancy tech for UB dressing. pt wears slip on shoes  OT Exercises  Exercise Treatment: RUE, 1# free weight x15 reps, to increase strength/endurance for improved functional use. Good tolerance overall. Min cues for pace with good carry over. A/AROM Exercises: LUE, AROM shoulder/elbow exercises x 15 reps to improve functional use for increased safety and independence with self-care and functional mobility.  Pt completed shoulder flexion/extension, elbow flexion/extension     Safety Devices  Type of Devices: Left in bed;Call light within reach; Patient at risk for falls     Patient Education  Education Given To: Patient  Education Provided: Role of Therapy;Plan of Care;Precautions; ADL Adaptive Strategies;Transfer Training  Education Method: Verbal  Barriers to Learning: Cognition  Education Outcome: Verbalized understanding;Continued education needed    Goals  Short Term Goals  Time Frame for Short term goals: By discharge  Short Term Goal 1: Pt will complete LB dressing/bathing/toileting tasks with CGA and Good safety to improve IND and safety with self care tasks. Short Term Goal 2: Pt will complete functional transfers/mobility with CGA and Good safety to improve IND and safety with self care tasks. Short Term Goal 3: Pt will tolerate standing for 5+ minutes during functional activity of choice with CGA and Good safety to improve IND and safety with self care and mobility tasks. Short Term Goal 4: Pt will verbalize/demonstrate Good understanding of at least 3 AD/DME/modified techniques to improve IND and participation with self care and mobility tasks. Short Term Goal 5: Pt will verbalize/demonstrate Good understanding of at least 3 fall prevention/home safety techniques to improve IND and safety with self care and mobility tasks. Short Term Goal 6: Pt will participate in 15+ minutes of therapeutic exercises/functional activities to improve IND and safety with self care and mobility tasks.      AM-[facility] Daily Activity Inpatient   How much help for putting on and taking off regular lower body clothing?: A Lot  How much help for Bathing?: A Lot  How much help for Toileting?: A Lot  How much help for putting on and taking off regular upper body clothing?: A Little  How much help for taking care of personal grooming?: A Little  How much help for eating meals?: A Little  [facility] Inpatient Daily Activity Raw Score: 15  AM-[facility] Inpatient ADL T-Scale Score : 34.69  ADL Inpatient CMS 0-100% Score: 56.46  ADL Inpatient CMS G-Code Modifier : CK    Therapy Time   Individual Concurrent Group Co-treatment   Time In 500 Rahat Rose         Time Out 1609         Minutes 19 SHWETA Stark

## 2022-09-08 NOTE — PROGRESS NOTES
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 She spoke of the fall at the [facility] and that she's lost a lot of ground with the injuries she incurred. Writer provided listening presence, emotional support, and patient welcomed prayer. 09/08/22 1737   Encounter Summary   Encounter Overview/Reason  Spiritual/Emotional Needs   Service Provided For: Patient   Referral/Consult From: [facility]   Support System Spouse   Last Encounter  09/08/22   Complexity of Encounter Moderate   Begin Time 1515   End Time  1530   Total Time Calculated 15 min   Spiritual/Emotional needs   Type Spiritual Support   Assessment/Intervention/Outcome   Assessment Anxious; Impaired resilience   Intervention Active listening;Discussed illness injury and its impact; Explored/Affirmed feelings, thoughts, concerns;Explored Coping Skills/Resources;Prayer (assurance of)/[place];Sustaining Presence/Ministry of presence   Outcome Engaged in conversation;Expressed feelings, needs, and concerns;Expressed Gratitude;Receptive

## 2022-09-08 NOTE — CARE COORDINATION
ONGOING DISCHARGE PLAN:    Patient is alert and oriented x4. Spoke with patient regarding discharge plan and patient confirms that plan is still SNF; LSW following. Will continue to follow for additional discharge needs.     Electronically signed by Oxana Lundy RN on 9/8/2022 at 2:32 PM

## 2022-09-08 NOTE — PROGRESS NOTES
Physical Therapy  Facility/Department: [facility]  Physical Therapy Assessment    Name: Lyn Hinton  : 1950  MRN: [id number]  Date of Service: 2022    Discharge Recommendations:  Patient would benefit from continued therapy after discharge   PT Equipment Recommendations  Equipment Needed: Yes  Mobility Devices: Alease Peat: Platform Left      Patient Diagnosis(es): The encounter diagnosis was Generalized weakness. Past Medical History:  has a past medical history of Allergic rhinitis, cause unspecified, Back pain, Bowel obstruction (HCC), C. difficile diarrhea, CAD (coronary artery disease), Cardiac murmur, Cellulitis, Cellulitis, Cerebral artery occlusion with cerebral infarction ([address] COVID-19, Diverticulosis of colon (without mention of hemorrhage), GERD (gastroesophageal reflux disease), GERD (gastroesophageal reflux disease), History of blood transfusion, History of CHF (congestive heart failure), History of MI (myocardial infarction), History of ovarian cyst, History of peritonitis, HTN (hypertension), Hx of blood clots, Hyperlipidemia, Intestinal or peritoneal adhesions with obstruction (postoperative) (postinfection) ([address] Kidney infection, Lateral epicondylitis  of elbow, MDRO (multiple drug resistant organisms) resistance, Muscle strain, Other abnormal glucose, PONV (postoperative nausea and vomiting), Pre-diabetes, Restless legs syndrome (RLS), Snores, 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[address] TIA (transient ischemic attack), Uses walker, Vitamin D deficiency, Wears glasses, and Wellness examination. Past Surgical History:  has a past surgical history that includes Ovary removal (); colectomy (); Appendectomy (); Ovary removal (); Hysterectomy (); Bunionectomy (Left); sinus surgery (); Colonoscopy; other surgical history (2014); cyst removal (Right); Wrist fracture surgery (Left, 2019);  Upper gastrointestinal endoscopy (N/A, 2019); Upper gastrointestinal endoscopy (N/A, 08/05/2019); Upper gastrointestinal endoscopy (N/A, 08/23/2019); Abdomen surgery (1976); lumbar fusion (N/A, 02/10/2020); Cardiac catheterization; Cardiac catheterization (2005); [place] tooth extraction; lumbar fusion (N/A, 06/17/2020); back surgery; cervical fusion (05/21/2021); cervical fusion (N/A, 05/21/2021); Finger Closed Reduction (Left, 08/24/2022); and Finger Closed Reduction (Left, 8/24/2022). Assessment   Body Structures, Functions, Activity Limitations Requiring Skilled Therapeutic Intervention: Decreased functional mobility ; Decreased endurance;Decreased balance  Assessment: Impaired mobility due to decreased endurance and safety issue of decreased balance. pt may benefit from a platform walker due to the splint causing difficulty to properly hold ont walker  Specific Instructions for Next Treatment: work on motor control L LE  Requires PT Follow-Up: Yes  Activity Tolerance  Activity Tolerance: Patient tolerated treatment well     Plan   Plan  Plan: 3-5 times per week  Specific Instructions for Next Treatment: work on motor control L LE  Current Treatment Recommendations: Balance training, Gait training, Functional mobility training, Endurance training, Safety education & training  Safety Devices  Type of Devices: Left in chair, Call light within reach, Chair alarm in place, Patient at risk for falls  Restraints  Restraints Initially in Place: No     Restrictions  Restrictions/Precautions  Restrictions/Precautions: Fall Risk, General Precautions, Up as Tolerated  Required Braces or Orthoses?: No  Implants present? : Metal implants (Wilfredo in back, wilfredo in L wrist from breaking it)  Position Activity Restriction  Other position/activity restrictions: Pt in splint LUE from closed reduction pinning of fifth metacarpal bone. Questionable WB status.      Subjective   Pain: pt c/o posterior headache but does not rate  General  Follows Commands: Within Functional Limits  Subjective  Subjective: pt pleasant and cooperative         Social/Functional History  Social/Functional History  Lives With: Spouse  Type of Home: Apartment  Home Layout: One level  Home Access: Stairs to enter without rails  Entrance Stairs - Number of Steps: 1  Bathroom Shower/Tub: Shower chair with back, Tub/Shower unit, Curtain  Bathroom Toilet: Handicap height (bedside commode from last surgery)  Bathroom Equipment: Grab bars in shower, Shower chair, Hand-held shower, Toilet raiser, Grab bars around toilet  Bathroom Accessibility: Accessible, Wheelchair accessible, Walker accessible  Home Equipment: Alert Darlen Feast, Walker, 4 wheeled, Barbara Alicia, rolling, [place] [id number], Sunglass [place], [place], [facility], [place], [employer] bars, Sock aid (uses the walker usually, WC for long distance)  Has the patient had two or more falls in the past year or any fall with injury in the past year?: Yes (10 falls, looses balance, had TIA and has weaker L side per pt)  Receives Help From: Family, Neighbor (dtr comes 2x week, neightbor helps with showers, spouse has trouble caring for pt and cannot get her off the floor)  ADL Assistance: Independent (Daughter stays with pt while she takes a shower)  Homemaking Assistance: Needs assistance (Daughter does the laundry,  does most the cooking, and neighbor cleans for patient)  Ambulation Assistance: Needs assistance (Uses walker)  Transfer Assistance: Needs assistance  Active : No  Patient's  Info:  drives pt around  Mode of Transportation: SUV, Truck (Pt primarily goes in the SUV)  Occupation: Retired  Type of Occupation: Hairdresser  Leisure & Hobbies: dance, read, phone games  IADL Comments: Hospital bed that adjusts and a lift chair  Additional Comments: Pt reports that  is not able to provide assist as much anymore due to being \"losing his breathe\".  Pt reports that daughter comes over 2x a week and that she has a neighbor who is a caregiver who comes over every now and then to assist.         Cognition   Orientation  Overall Orientation Status: Within Functional Limits     Objective     Bed mobility  Supine to Sit: Stand by assistance (head of bed elevated)  Sit to Supine: Unable to assess (pt left up in chair)  Scooting: Stand by assistance  Bed Mobility Comments: p denied dizziness  Transfers  Sit to Stand: Contact guard assistance  Stand to sit: Contact guard assistance  Bed to Chair: Contact guard assistance (with RW)  Ambulation  Device: Rolling Walker  Other Apparatus:  (L wrist splint)  Assistance: Contact guard assistance  Quality of Gait: decreased coordination in L LE with increased step length on L and decreased step length on the R demonstarating a step-to gait pattern  Gait Deviations: Slow Alciia;Decreased step height  Distance: 20ft and 15ft  Stairs/Curb  Stairs?: No     Balance  Sitting - Static: Fair;+ (SBA)  Sitting - Dynamic: Fair;+ (SBA)  Standing - Static: Fair (CGA)  Standing - Dynamic: Fair (CGA)     AM-PAC Score  AM-PAC Inpatient Mobility Raw Score : 17 (09/08/22 1133)  AM-PAC Inpatient T-Scale Score : 42.13 (09/08/22 1133)  Mobility Inpatient CMS 0-100% Score: 50.57 (09/08/22 1133)  Mobility Inpatient CMS G-Code Modifier : CK (09/08/22 1133)        Goals  Short Term Goals  Time Frame for Short term goals: 5 days  Short term goal 1: pt to perform bed mobility with SBA  Short term goal 2: pt to transfer STS with Min A with [employer] and demonstrate good ecentric control with sitting  Short term goal 3: pt to ambulate 20 ft with [employer] and Min A  Short term goal 4: pt to be indep with seated HEP  Patient Goals   Patient goals : to be more independent            Therapy Time   Individual Concurrent Group Co-treatment   Time In 1025         Time Out 1049         Minutes 53 Tona Jeff, PT

## 2022-09-08 NOTE — CARE COORDINATION
[facility] can accept and stated that they started the pre-cert today 1/7/17.     Electronically signed by Myah Vidal on 9/8/22 at 3:53 PM EDT

## 2022-09-09 LAB
ABSOLUTE EOS #: 0.4 K/UL (ref 0–0.4)
ABSOLUTE LYMPH #: 1.8 K/UL (ref 1–4.8)
ABSOLUTE MONO #: 0.5 K/UL (ref 0.1–1.3)
ANION GAP SERPL CALCULATED.3IONS-SCNC: 8 MMOL/L (ref 9–17)
BASOPHILS # BLD: 1 % (ref 0–2)
BASOPHILS ABSOLUTE: 0.1 K/UL (ref 0–0.2)
BUN BLDV-MCNC: 25 MG/DL (ref 8–23)
CALCIUM SERPL-MCNC: 9.2 MG/DL (ref 8.6–10.4)
CHLORIDE BLD-SCNC: 104 MMOL/L (ref 98–107)
CO2: 29 MMOL/L (ref 20–31)
CREAT SERPL-MCNC: 0.87 MG/DL (ref 0.5–0.9)
EOSINOPHILS RELATIVE PERCENT: 8 % (ref 0–4)
GFR AFRICAN AMERICAN: >60 ML/MIN
GFR NON-AFRICAN AMERICAN: >60 ML/MIN
GFR SERPL CREATININE-BSD FRML MDRD: ABNORMAL ML/MIN/{1.73_M2}
GLUCOSE BLD-MCNC: 111 MG/DL (ref 70–99)
HCT VFR BLD CALC: 35.6 % (ref 36–46)
HEMOGLOBIN: 11.9 G/DL (ref 12–16)
LYMPHOCYTES # BLD: 30 % (ref 24–44)
MCH RBC QN AUTO: 30.7 PG (ref 26–34)
MCHC RBC AUTO-ENTMCNC: 33.4 G/DL (ref 31–37)
MCV RBC AUTO: 91.8 FL (ref 80–100)
MONOCYTES # BLD: 8 % (ref 1–7)
PDW BLD-RTO: 14.8 % (ref 11.5–14.9)
PLATELET # BLD: 252 K/UL (ref 150–450)
PMV BLD AUTO: 7.2 FL (ref 6–12)
POTASSIUM SERPL-SCNC: 4.3 MMOL/L (ref 3.7–5.3)
RBC # BLD: 3.88 M/UL (ref 4–5.2)
SEG NEUTROPHILS: 53 % (ref 36–66)
SEGMENTED NEUTROPHILS ABSOLUTE COUNT: 3.1 K/UL (ref 1.3–9.1)
SODIUM BLD-SCNC: 141 MMOL/L (ref 135–144)
WBC # BLD: 5.9 K/UL (ref 3.5–11)

## 2022-09-09 PROCEDURE — 97116 GAIT TRAINING THERAPY: CPT

## 2022-09-09 PROCEDURE — 2580000003 HC RX 258

## 2022-09-09 PROCEDURE — 36415 COLL VENOUS BLD VENIPUNCTURE: CPT

## 2022-09-09 PROCEDURE — 1200000000 HC SEMI PRIVATE

## 2022-09-09 PROCEDURE — 6370000000 HC RX 637 (ALT 250 FOR IP): Performed by: NURSE PRACTITIONER

## 2022-09-09 PROCEDURE — 85025 COMPLETE CBC W/AUTO DIFF WBC: CPT

## 2022-09-09 PROCEDURE — 6370000000 HC RX 637 (ALT 250 FOR IP)

## 2022-09-09 PROCEDURE — 99232 SBSQ HOSP IP/OBS MODERATE 35: CPT | Performed by: INTERNAL MEDICINE

## 2022-09-09 PROCEDURE — 80048 BASIC METABOLIC PNL TOTAL CA: CPT

## 2022-09-09 PROCEDURE — 6370000000 HC RX 637 (ALT 250 FOR IP): Performed by: INTERNAL MEDICINE

## 2022-09-09 RX ORDER — ATORVASTATIN CALCIUM 20 MG/1
20 TABLET, FILM COATED ORAL NIGHTLY
Qty: 30 TABLET | Refills: 3 | Status: SHIPPED | OUTPATIENT
Start: 2022-09-09 | End: 2022-10-03 | Stop reason: SDUPTHER

## 2022-09-09 RX ORDER — POLYETHYLENE GLYCOL 3350 17 G/17G
17 POWDER, FOR SOLUTION ORAL DAILY PRN
Qty: 527 G | Refills: 1 | Status: SHIPPED | OUTPATIENT
Start: 2022-09-09 | End: 2022-10-09

## 2022-09-09 RX ORDER — CARVEDILOL 12.5 MG/1
12.5 TABLET ORAL 2 TIMES DAILY WITH MEALS
Status: DISCONTINUED | OUTPATIENT
Start: 2022-09-09 | End: 2022-09-10 | Stop reason: HOSPADM

## 2022-09-09 RX ORDER — HYDROCODONE BITARTRATE AND ACETAMINOPHEN 5; 325 MG/1; MG/1
1 TABLET ORAL EVERY 8 HOURS PRN
Qty: 10 TABLET | Refills: 0 | Status: SHIPPED | OUTPATIENT
Start: 2022-09-09 | End: 2022-09-12

## 2022-09-09 RX ADMIN — PRAMIPEXOLE DIHYDROCHLORIDE 0.5 MG: 0.25 TABLET ORAL at 20:43

## 2022-09-09 RX ADMIN — HYDROCODONE BITARTRATE AND ACETAMINOPHEN 1 TABLET: 5; 325 TABLET ORAL at 04:08

## 2022-09-09 RX ADMIN — CARVEDILOL 12.5 MG: 12.5 TABLET, FILM COATED ORAL at 09:03

## 2022-09-09 RX ADMIN — Medication 6 MG: at 20:43

## 2022-09-09 RX ADMIN — PANTOPRAZOLE SODIUM 40 MG: 40 TABLET, DELAYED RELEASE ORAL at 05:54

## 2022-09-09 RX ADMIN — SODIUM CHLORIDE, PRESERVATIVE FREE 10 ML: 5 INJECTION INTRAVENOUS at 20:46

## 2022-09-09 RX ADMIN — HYDROCODONE BITARTRATE AND ACETAMINOPHEN 1 TABLET: 5; 325 TABLET ORAL at 18:52

## 2022-09-09 RX ADMIN — FUROSEMIDE 20 MG: 20 TABLET ORAL at 08:58

## 2022-09-09 RX ADMIN — BUSPIRONE HYDROCHLORIDE 5 MG: 5 TABLET ORAL at 08:58

## 2022-09-09 RX ADMIN — BUSPIRONE HYDROCHLORIDE 5 MG: 5 TABLET ORAL at 20:43

## 2022-09-09 RX ADMIN — CARVEDILOL 12.5 MG: 12.5 TABLET, FILM COATED ORAL at 18:49

## 2022-09-09 RX ADMIN — FENOFIBRATE 160 MG: 160 TABLET ORAL at 08:58

## 2022-09-09 RX ADMIN — APIXABAN 5 MG: 5 TABLET, FILM COATED ORAL at 20:43

## 2022-09-09 RX ADMIN — APIXABAN 5 MG: 5 TABLET, FILM COATED ORAL at 08:58

## 2022-09-09 RX ADMIN — BUSPIRONE HYDROCHLORIDE 5 MG: 5 TABLET ORAL at 14:21

## 2022-09-09 RX ADMIN — CITALOPRAM HYDROBROMIDE 20 MG: 20 TABLET ORAL at 08:58

## 2022-09-09 RX ADMIN — FERROUS SULFATE TAB 325 MG (65 MG ELEMENTAL FE) 325 MG: 325 (65 FE) TAB at 08:58

## 2022-09-09 RX ADMIN — ATORVASTATIN CALCIUM 20 MG: 20 TABLET, FILM COATED ORAL at 20:43

## 2022-09-09 RX ADMIN — SODIUM CHLORIDE, PRESERVATIVE FREE 10 ML: 5 INJECTION INTRAVENOUS at 09:00

## 2022-09-09 ASSESSMENT — PAIN DESCRIPTION - LOCATION
LOCATION: BACK;HAND
LOCATION: NECK;HAND
LOCATION: HAND
LOCATION: HAND

## 2022-09-09 ASSESSMENT — ENCOUNTER SYMPTOMS
COUGH: 0
DIARRHEA: 0
BACK PAIN: 0
SHORTNESS OF BREATH: 0
VOMITING: 0
ABDOMINAL PAIN: 0
WHEEZING: 0
CONSTIPATION: 0
CHEST TIGHTNESS: 0
RHINORRHEA: 0
CHOKING: 0
COLOR CHANGE: 1
NAUSEA: 0
ABDOMINAL DISTENTION: 0

## 2022-09-09 ASSESSMENT — PAIN SCALES - GENERAL
PAINLEVEL_OUTOF10: 7
PAINLEVEL_OUTOF10: 6

## 2022-09-09 ASSESSMENT — PAIN DESCRIPTION - ORIENTATION
ORIENTATION: RIGHT;LEFT
ORIENTATION: LEFT

## 2022-09-09 ASSESSMENT — PAIN SCALES - WONG BAKER: WONGBAKER_NUMERICALRESPONSE: 0

## 2022-09-09 ASSESSMENT — PAIN DESCRIPTION - PAIN TYPE: TYPE: ACUTE PAIN

## 2022-09-09 ASSESSMENT — PAIN - FUNCTIONAL ASSESSMENT: PAIN_FUNCTIONAL_ASSESSMENT: PREVENTS OR INTERFERES SOME ACTIVE ACTIVITIES AND ADLS

## 2022-09-09 ASSESSMENT — PAIN DESCRIPTION - DESCRIPTORS: DESCRIPTORS: DISCOMFORT

## 2022-09-09 NOTE — PROGRESS NOTES
Occupational Therapy  Facility/Department: [facility] MED SURG  Daily Treatment Note  NAME: Nazia Calhoun  : 1950  MRN: [id number]    Date of Service: 2022    Discharge Recommendations:  Patient would benefit from continued therapy after discharge  OT Equipment Recommendations  Other: TBD      Patient Diagnosis(es): The encounter diagnosis was Generalized weakness. Assessment    Activity Tolerance: Patient tolerated treatment well  Discharge Recommendations: Patient would benefit from continued therapy after discharge  Other: TBD      Plan   Plan  Times per Week: 4-6  Current Treatment Recommendations: Strengthening;ROM;Balance training;Functional mobility training; Endurance training;Cognitive reorientation;Pain management; Safety education & training;Patient/Caregiver education & training;Equipment evaluation, education, & procurement;Self-Care / ADL;Cognitive/Perceptual training;Coordination training     Restrictions       Subjective   Subjective  Subjective: pt states that she is having pain in L hand this date. pt alert and cooperative. Orientation  Overall Orientation Status: Within Functional Limits    Pain Assessment   Pain Assessment 0-10   Pain Level 6   Pain Location Hand   Pain Orientation Left   Pain Descriptors Discomfort   Functional Pain Assessment Prevents or interferes some active activities and ADLs   Pain Type Acute pain   Non-Pharmaceutical Pain Intervention(s) Distraction; Emotional support       Cognition  Overall Cognitive Status: Exceptions  Arousal/Alertness: Appropriate responses to stimuli  Following Commands: Follows one step commands consistently; Follows one step commands with increased time  Attention Span: Attends with cues to redirect  Safety Judgement: Decreased awareness of need for assistance;Decreased awareness of need for safety  Problem Solving: Assistance required to generate solutions;Assistance required to implement solutions;Assistance required to identify errors made;Assistance required to correct errors made        Objective    Vitals     Bed Mobility Training  Bed Mobility Training: No (pt up in chair)     ADL  Feeding: Setup  Grooming: Setup  UE Bathing: Stand by assistance  LE Bathing: Moderate assistance  UE Dressing: Stand by assistance  LE Dressing: Moderate assistance  Toileting: Minimal assistance  Toileting Skilled Clinical Factors: per pt report pt able to manage clothing but req assist for buttocks care after BM. pt educated on utilizing AE ie bathroom christiano/tongs. Additional Comments: ADL scores based on clinical reasoning and skilled observation unless otherwise noted. Pt currently limited by safety awareness, balance, pain, fatigue, and activity tolerance impacting IND with self care tasks. pt educated on nancy tech for UB dressing. pt wears slip on shoes  OT Exercises  Exercise Treatment: RUE, 2# free weight x15 reps, to increase strength/endurance for improved functional use. Good tolerance overall. Min cues for pace with good carry over. A/AROM Exercises: LUE, AROM shoulder/elbow exercises x 15 reps to improve functional use for increased safety and independence with self-care and functional mobility. Pt completed shoulder flexion/extension, elbow flexion           Patient Education  Education Given To: Patient  Education Provided: Role of Therapy;Plan of Care;Precautions; ADL Adaptive Strategies;Transfer Training  Education Method: Verbal  Barriers to Learning: Cognition  Education Outcome: Verbalized understanding;Continued education needed    Goals  Short Term Goals  Time Frame for Short term goals: By discharge  Short Term Goal 1: Pt will complete LB dressing/bathing/toileting tasks with CGA and Good safety to improve IND and safety with self care tasks. Short Term Goal 2: Pt will complete functional transfers/mobility with CGA and Good safety to improve IND and safety with self care tasks.   Short Term Goal 3: Pt will tolerate standing for 5+ minutes during functional activity of choice with CGA and Good safety to improve IND and safety with self care and mobility tasks. Short Term Goal 4: Pt will verbalize/demonstrate Good understanding of at least 3 AD/DME/modified techniques to improve IND and participation with self care and mobility tasks. Short Term Goal 5: Pt will verbalize/demonstrate Good understanding of at least 3 fall prevention/home safety techniques to improve IND and safety with self care and mobility tasks. Short Term Goal 6: Pt will participate in 15+ minutes of therapeutic exercises/functional activities to improve IND and safety with self care and mobility tasks.      AM-[facility] Daily Activity Inpatient   How much help for putting on and taking off regular lower body clothing?: A Lot  How much help for Bathing?: A Lot  How much help for Toileting?: A Little  How much help for putting on and taking off regular upper body clothing?: A Little  How much help for taking care of personal grooming?: A Little  How much help for eating meals?: A Little  AM-[facility] Inpatient Daily Activity Raw Score: 16  AM-PAC Inpatient ADL T-Scale Score : 35.96  ADL Inpatient CMS 0-100% Score: 53.32  ADL Inpatient CMS G-Code Modifier : CK    Therapy Time   Individual Concurrent Group Co-treatment   Time In 1045         Time Out 1110         Minutes 25               Safety measures utilized: Call light within reach, Sitting in chair, and Nurse notified    SHWETA Salmon

## 2022-09-09 NOTE — PROGRESS NOTES
[address]    PROGRESS NOTE             9/9/2022    2:37 PM    Name:   Duke Canavan  MRN:     [id number]     Acct:      [de-identified]   Room:   2049/2049-01  IP Day:  3  Admit Date:  9/6/2022  8:51 AM    PCP:  Lisa Pereyra MD  Code Status:  Full Code    Subjective:     C/C:   Chief Complaint   Patient presents with    Fall    Hip Pain     Right     Interval History Status: improved. The patient was seen and examined at the bedside. No acute overnight events as per patient and RN. Patient mentioned good sleep and good appetite. /68. HR 59. Discharge planned for today. Brief History:     The patient is a 71 y.o. Non- / non  female with a history of stroke, CHFpEF, chronic DVT in bilateral lower limbs on Eliquis, obesity, recurrent right leg cellulitis, MI, hypertension, hyperlipidemia, restless leg syndrome, depression presents to the clinic after suffering a fall 2 days ago. The patient was ambulating around her home when she lost balance and fell down on her right elbow and right head. The patient normally uses a walker and wheelchair, however, it unclear if she was using the walker at the time of the fall. She denies any dizziness, light-headedness, and vision changes at the time of the fall. The fall was unwitnessed. She denies loosing consciousness. The patient reports several falls over the past year. She lives at home with her , however he is ill  and unable to help her at home. The patient reports right elbow bruising and pain and right periorbital/cheek bruising and pain, urinary frequency, urinary urgency, chronic cough, fatigue, and left arm weakness (chronic since her stroke). She denies chest pain, shortness of breath, cough, chest pain, abdominal pain, nausea, vomiting, constipation and diarrhea.       The patient was recently discharged from [facility] on 7/21/22 where she was admitted for management of fracture to body of sternum following a fall and right leg cellulitis. She received IV antibiotics and was discharged to an extended care facility. During her stay, the patient fell and sustained a left metacarpal neck fracture. She follows up with [facility] plastic surgeons for treatment of the fracture. The ED, the patient was vitally stable, however BP was elevated (195/66). X-ray of hip and pelvis on right showed no acute osseous abnormality. X-ray of right elbow was negative. CBC was unremarkable. CT head without contrast showed no acute intracranial abnormality. CT facial bones without contrast showed no acute intracranial abnormality. Glucose was 120. The patient was admitted for investigation of fall. Review of Systems:     Review of Systems   Constitutional:  Negative for activity change, chills, diaphoresis, fatigue and fever. HENT:  Negative for congestion and rhinorrhea. Respiratory:  Negative for cough, choking, chest tightness, shortness of breath and wheezing. Gastrointestinal:  Negative for abdominal distention, abdominal pain, constipation, diarrhea, nausea and vomiting. Endocrine: Negative for cold intolerance and heat intolerance. Genitourinary:  Negative for dysuria, frequency and urgency. Musculoskeletal:  Negative for arthralgias, back pain and myalgias. Skin:  Positive for color change. Neurological:  Negative for dizziness, tremors, syncope, weakness, light-headedness and headaches. Psychiatric/Behavioral:  Negative for agitation and confusion. Medications: Allergies:     Allergies   Allergen Reactions    Bactrim [Sulfamethoxazole-Trimethoprim] Other (See Comments)     confusion    Codeine Itching    Diazepam Other (See Comments)    Meperidine Hcl Other (See Comments)    Seasonal        Current Meds:   Scheduled Meds:    carvedilol  12.5 mg Oral BID WC    apixaban  5 mg Oral BID    busPIRone  5 mg Oral TID    citalopram  20 mg Oral Daily    fenofibrate  160 mg Oral Daily    furosemide  20 mg Oral Daily    pantoprazole  40 mg Oral QAM AC    pramipexole  0.5 mg Oral Nightly    sodium chloride flush  5-40 mL IntraVENous 2 times per day    ferrous sulfate  325 mg Oral Daily with breakfast    atorvastatin  20 mg Oral Nightly     Continuous Infusions:    sodium chloride       PRN Meds: albuterol-ipratropium, melatonin, sodium chloride flush, sodium chloride, ondansetron **OR** ondansetron, polyethylene glycol, acetaminophen **OR** acetaminophen, potassium chloride **OR** potassium alternative oral replacement **OR** potassium chloride, HYDROcodone 5 mg - acetaminophen    Data:     Past Medical History:   has a past medical history of Allergic rhinitis, cause unspecified, Back pain, Bowel obstruction (HCC), C. difficile diarrhea, CAD (coronary artery disease), Cardiac murmur, Cellulitis, Cellulitis, Cerebral artery occlusion with cerebral infarction ([facility] [address] COVID-19, Diverticulosis of colon (without mention of hemorrhage), GERD (gastroesophageal reflux disease), GERD (gastroesophageal reflux disease), History of blood transfusion, History of CHF (congestive heart failure), History of MI (myocardial infarction), History of ovarian cyst, History of peritonitis, HTN (hypertension), Hx of blood clots, Hyperlipidemia, Intestinal or peritoneal adhesions with obstruction (postoperative) (postinfection) ([facility] [address] Kidney infection, Lateral epicondylitis  of elbow, MDRO (multiple drug resistant organisms) resistance, Muscle strain, Other abnormal glucose, PONV (postoperative nausea and vomiting), Pre-diabetes, Restless legs syndrome (RLS), Snores, 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[facility] [address] TIA (transient ischemic attack), Uses walker, Vitamin D deficiency, Wears glasses, and Wellness examination. Social History:   reports that she quit smoking about 5 years ago. Her smoking use included cigarettes. She started smoking about 27 years ago.  She has a 10.00 pack-year smoking history. She has never used smokeless tobacco. She reports that she does not drink alcohol and does not use drugs. Family History:   Family History   Problem Relation Age of Onset    Stroke Mother     Diabetes Mother     Heart Disease Mother     High Blood Pressure Mother     Heart Disease Father     Heart Disease Brother     High Blood Pressure Brother     Heart Disease Maternal Grandmother     High Blood Pressure Sister        Vitals:  BP (!) 182/78   Pulse 65   Temp 98.2 °F (36.8 °C)   Resp 18   Ht 5' 3\" (1.6 m)   Wt 182 lb 1.6 oz (82.6 kg)   SpO2 95%   BMI 32.26 kg/m²   Temp (24hrs), Av.1 °F (36.7 °C), Min:98 °F (36.7 °C), Max:98.2 °F (36.8 °C)    No results for input(s): POCGLU in the last 72 hours. I/O(24Hr): Intake/Output Summary (Last 24 hours) at 2022 1437  Last data filed at 2022 0550  Gross per 24 hour   Intake --   Output 1400 ml   Net -1400 ml         Labs:  [unfilled]    Lab Results   Component Value Date/Time    SPECIAL NOT REPORTED 2021 09:58 AM     Lab Results   Component Value Date/Time    CULTURE NO GROWTH 5 DAYS 2022 07:40 PM       [unfilled]    Radiology:    XR ELBOW RIGHT (MIN 3 VIEWS)    Result Date: 2022  EXAMINATION: THREE XRAY VIEWS OF THE RIGHT ELBOW 2022 9:29 am COMPARISON: None. HISTORY: ORDERING SYSTEM PROVIDED HISTORY: fall, pain TECHNOLOGIST PROVIDED HISTORY: fall, pain Reason for Exam: fall, pain FINDINGS: No fracture. No dislocation. No sizable elbow joint effusion. No destructive or blastic lesion. Mild degenerative changes, best seen coronoid process, radial head and distal humeral epicondyles. Mineralization WNL. No fracture or dislocation.      XR WRIST LEFT (MIN 3 VIEWS)    Result Date: 2022  X-rays taken in clinic and preliminarily reviewed by me 22: 3 views of the left wrist demonstrate a comminuted, shortened significant dorsal angulation of the left fifth metacarpal neck/shaft fracture. Patient with evidence of old injury to the ulnar styloid. Volar plate is present from old distal radius injury    CT HEAD WO CONTRAST    Result Date: 9/6/2022  EXAMINATION: CT OF THE HEAD WITHOUT CONTRAST; CT OF THE FACE WITHOUT CONTRAST 9/6/2022 11:08 am; 9/6/2022 11:09 am TECHNIQUE: CT of the head was performed without the administration of intravenous contrast. Automated exposure control, iterative reconstruction, and/or weight based adjustment of the mA/kV was utilized to reduce the radiation dose to as low as reasonably achievable.; CT of the face was performed without the administration of intravenous contrast. Multiplanar reformatted images are provided for review. Automated exposure control, iterative reconstruction, and/or weight based adjustment of the mA/kV was utilized to reduce the radiation dose to as low as reasonably achievable. COMPARISON: 07/10/2022 HISTORY: ORDERING SYSTEM PROVIDED HISTORY: fall, head trauma, on anticoagulation FINDINGS: CT HEAD: BRAIN/VENTRICLES: There is no acute intracranial hemorrhage, mass effect or midline shift. No abnormal extra-axial fluid collection. The gray-white differentiation is maintained without evidence of an acute infarct. There is no evidence of hydrocephalus. Mild generalized parenchymal volume loss and mild chronic small vessel disease. CT FACIAL BONES: FACIAL BONES: The maxilla, pterygoid plates and zygomatic arches are intact. The mandible is intact. The mandibular condyles are normally situated with similar advanced degenerative change involving the right TMJ. The nasal bones and maxillary nasal processes are intact. ORBITS: The globes appear intact. The extraocular muscles, optic nerve sheath complexes and lacrimal glands appear unremarkable. No retrobulbar hematoma or mass is seen. The orbital walls and rims are intact. Bilateral pseudophakia. SINUSES/MASTOIDS:  No acute fracture is seen.   Sequelae of prior endoscopic sinus surgery with bilateral antrostomy and partial ethmoidectomies. Mild mucosal thickening involving floor of the maxillary sinuses with unchanged benign heterotopic ossification within the right maxillary sinus. SOFT TISSUES: No acute abnormality of the visualized skull or soft tissues. No acute intracranial abnormality. No acute traumatic injury of the facial bones. CT FACIAL BONES WO CONTRAST    Result Date: 9/6/2022  EXAMINATION: CT OF THE HEAD WITHOUT CONTRAST; CT OF THE FACE WITHOUT CONTRAST 9/6/2022 11:08 am; 9/6/2022 11:09 am TECHNIQUE: CT of the head was performed without the administration of intravenous contrast. Automated exposure control, iterative reconstruction, and/or weight based adjustment of the mA/kV was utilized to reduce the radiation dose to as low as reasonably achievable.; CT of the face was performed without the administration of intravenous contrast. Multiplanar reformatted images are provided for review. Automated exposure control, iterative reconstruction, and/or weight based adjustment of the mA/kV was utilized to reduce the radiation dose to as low as reasonably achievable. COMPARISON: 07/10/2022 HISTORY: ORDERING SYSTEM PROVIDED HISTORY: fall, head trauma, on anticoagulation FINDINGS: CT HEAD: BRAIN/VENTRICLES: There is no acute intracranial hemorrhage, mass effect or midline shift. No abnormal extra-axial fluid collection. The gray-white differentiation is maintained without evidence of an acute infarct. There is no evidence of hydrocephalus. Mild generalized parenchymal volume loss and mild chronic small vessel disease. CT FACIAL BONES: FACIAL BONES: The maxilla, pterygoid plates and zygomatic arches are intact. The mandible is intact. The mandibular condyles are normally situated with similar advanced degenerative change involving the right TMJ. The nasal bones and maxillary nasal processes are intact. ORBITS: The globes appear intact.   The extraocular muscles, optic nerve sheath complexes and lacrimal glands appear unremarkable. No retrobulbar hematoma or mass is seen. The orbital walls and rims are intact. Bilateral pseudophakia. SINUSES/MASTOIDS:  No acute fracture is seen. Sequelae of prior endoscopic sinus surgery with bilateral antrostomy and partial ethmoidectomies. Mild mucosal thickening involving floor of the maxillary sinuses with unchanged benign heterotopic ossification within the right maxillary sinus. SOFT TISSUES: No acute abnormality of the visualized skull or soft tissues. No acute intracranial abnormality. No acute traumatic injury of the facial bones. XR HIP 2-3 VW W PELVIS RIGHT    Result Date: 9/6/2022  EXAMINATION: ONE XRAY VIEW OF THE PELVIS AND TWO XRAY VIEWS RIGHT HIP 9/6/2022 9:29 am COMPARISON: CT 07/20/2022 HISTORY: ORDERING SYSTEM PROVIDED HISTORY: fall, pain TECHNOLOGIST PROVIDED HISTORY: fall, pain Reason for Exam: fall, pain FINDINGS: There is diffuse demineralization, which limits evaluation for acute fracture. The hip demonstrates normal alignment. No evidence of acute fracture. No focal osseus lesion. Pelvis is intact. Partially visualized postsurgical changes of the lumbar spine. Bowel gas obscures the sacrum and pubic symphysis. No acute osseous abnormality. Given the degree of osteopenia, nondisplaced fractures may be radiographically occult. If pain or concern for fracture persists, consider MR imaging. Physical Examination:        Physical Exam  Constitutional:       General: She is not in acute distress. Appearance: Normal appearance. HENT:      Nose: No congestion or rhinorrhea. Eyes:      Extraocular Movements: Extraocular movements intact. Conjunctiva/sclera: Conjunctivae normal.      Pupils: Pupils are equal, round, and reactive to light. Cardiovascular:      Rate and Rhythm: Normal rate and regular rhythm. Pulses: Normal pulses. Heart sounds: Normal heart sounds. No murmur heard.     No friction rub. No gallop. Pulmonary:      Effort: Pulmonary effort is normal. No respiratory distress. Breath sounds: Normal breath sounds. No wheezing or rales. Abdominal:      General: Abdomen is flat. Bowel sounds are normal. There is no distension. Tenderness: There is no abdominal tenderness. There is no guarding. Musculoskeletal:      Right lower leg: No edema. Left lower leg: No edema. Skin:     Capillary Refill: Capillary refill takes less than 2 seconds. Coloration: Skin is not jaundiced or pale. Findings: Bruising present. Neurological:      General: No focal deficit present. Mental Status: She is alert and oriented to person, place, and time. Cranial Nerves: No cranial nerve deficit. Sensory: No sensory deficit. Psychiatric:         Mood and Affect: Mood normal.         Assessment:        Primary Problem  Fall as cause of accidental injury in home as place of occurrence, initial encounter    Active Hospital Problems    Diagnosis Date Noted    Fall as cause of accidental injury in home as place of occurrence, initial encounter [W19. Bette Jeimy, C09.952] 09/06/2022     Priority: Medium       Plan:        Investigation of fall  -X-ray of hip and pelvis on right: no acute osseous abnormality  -X-ray of right elbow was negative  -CBC was unremarkable  -BUN was elevated at 25, baseline is 18  -CT head without contrast showed no acute intracranial abnormality. -CT facial bones without contrast showed no acute intracranial abnormality.   -Fall precautions in place  -Neurovascular checks        2. Hypertension  -BP today: 149/68  -Continue Coreg 12.5 mg po daily  -Continue Lasix 20 mg po daily   -Amlodipine 5 mg on hold         3. Hyperlipidemia  -Continue lipitor 40 mg po daily  -Continue fenofibrate 160 mg po daily         4. Depression  -Continue Buspar 5 mg po TID  -Continue Celexa 20 mg po daily        5.  CHFpEF  -Echo 4/8/22: Normal LVEF (>55%), Grade I (mild) LV diastolic dysfunction. Mild TR  -Continue Lasix 20 mg po daily        6. Restless leg syndrome  -Continue pramipexole 0.5 mg po nightly         7. Chronic DVT of bilateral lower limbs  -Continue Eliquis 5 mg po BID  -bilateral compression stockings         PT/OT consulted  Diet: Normal adult diet; low sodium  Code: Full code  DVT prophylaxis: Eliquis 5 mg po BID  Social work consulted     9/9/22    Transfer from teaching . To snf       Patient is a 81-year-old female PMH significant for Hx stroke, HFpEF, chronic DVT in bilateral lower extremities on Eliquis, hypertension who presented to the hospital s/p fall and was admitted for mechanical fall. Work-up including imaging is negative thus far.   Patient is waiting for SNF placement          Kaylyn Sams MD  9/9/2022  2:37 PM

## 2022-09-09 NOTE — PROGRESS NOTES
Physician Progress Note      PATIENT:               Coleen Sanchez  CSN #:                  [id number]  :                       1950  ADMIT DATE:       2022 8:51 AM  DISCH DATE:  RESPONDING  PROVIDER #:        Shauna Strauss MD          QUERY TEXT:    Pt admitted for increased weakness and falls x3 years, worsening in the last   year. Recent admit for fall and fx sternum. Left arm weakness from prior   stroke. Lumbar spine w/ osteopenia. If possible, please document in the   progress notes and discharge summary if you are evaluating and / or treating   any of the following: The medical record reflects the following:  - Risk Factors: age, CVA, osteopenia, CHFpEF,  recurrent right leg cellulitis  - Clinical Indicators:  fall w/ ambulation has a walker/wheelchair. The   patient was ambulating around her home when she lost balance and fell down on   her right elbow and right head. The patient reports several falls over the   past year. Recent sternum fracture and left metacarpal neck fracture. The   patient was admitted for investigation of fall. Radiological studies negative   for acute abnormalities. - Treatment: straight cane, walker, shower chair, and grab bars; VNS. LSW for   possible SNF  Options provided:  -- Age Related Physical Debility  -- Falls r/t osteopenia  -- Weakness/fatigue/falls due to, Please document other cause. -- Other - I will add my own diagnosis  -- Disagree - Not applicable / Not valid  -- Disagree - Clinically unable to determine / Unknown  -- Refer to Clinical Documentation Reviewer    PROVIDER RESPONSE TEXT:    Falls as a result of age related physical debility.     Query created by: Dre Jimenez on 2022 2:55 PM      Electronically signed by:  Shauna Strauss MD 2022 9:49 PM

## 2022-09-09 NOTE — PROGRESS NOTES
Physical Therapy  Facility/Department: [facility] MED SURG  Daily Treatment Note  NAME: Puneet Ramírez  : 1950  MRN: [id number]    Date of Service: 2022    Discharge Recommendations:  Patient would benefit from continued therapy after discharge   PT Equipment Recommendations  Equipment Needed: Yes  Walker: Platform Left    Patient Diagnosis(es): The encounter diagnosis was Generalized weakness. Assessment   Activity Tolerance: Patient tolerated treatment well  Equipment Needed: Yes     Plan    Plan  Plan: 3-5 times per week  Specific Instructions for Next Treatment: work on motor control L LE  Current Treatment Recommendations: Balance training;Gait training;Functional mobility training; Endurance training; Safety education & training     Restrictions  Restrictions/Precautions  Restrictions/Precautions: Fall Risk, General Precautions, Up as Tolerated  Required Braces or Orthoses?: No  Implants present? : Metal implants (Wilfredo in back, wilfredo in L wrist from breaking it)  Position Activity Restriction  Other position/activity restrictions: Pt in splint LUE from closed reduction pinning of fifth metacarpal bone. Questionable WB status. Subjective    Subjective  Subjective: Pt is in bed upon arrival. Agreeable to PT. Orientation  Overall Orientation Status: Within Functional Limits  Orientation Level: Oriented X4  Cognition  Overall Cognitive Status: Exceptions  Arousal/Alertness: Appropriate responses to stimuli  Following Commands: Follows one step commands consistently; Follows one step commands with increased time  Attention Span: Attends with cues to redirect  Safety Judgement: Decreased awareness of need for assistance;Decreased awareness of need for safety  Problem Solving: Assistance required to generate solutions;Assistance required to implement solutions;Assistance required to identify errors made;Assistance required to correct errors made  Insights: Decreased awareness of deficits  Initiation: Requires cues for some  Sequencing: Requires cues for some     Objective   Vitals  O2 Device: None (Room air)  Bed Mobility Training    Overall Level of Assistance: Additional time  Interventions: Verbal cues; Safety awareness training  Rolling: Stand-by assistance  Supine to Sit: Contact-guard assistance  Scooting: Stand-by assistance; Additional time  Balance  Sitting: Impaired  Standing: Impaired  Transfer Training  Transfer Training: Yes  Sit to Stand: Minimum assistance (Transfers completed from bed and toilet this date.)  Stand to Sit: Contact-guard assistance (decrease eccentric control noted.)  Gait Training  Gait Training: Yes  Gait  Overall Level of Assistance: Minimum assistance  Interventions: Verbal cues; Tactile cues; Safety awareness training  Base of Support: Widened  Speed/Alicia: Slow  Step Length: Left lengthened;Right shortened  Swing Pattern: Left asymmetrical;Right asymmetrical  Stance: Left increased;Right decreased  Gait Abnormalities: Antalgic;Decreased step clearance;Shuffling gait; Step to gait;Trunk sway increased; Toe walking  Distance (ft): 30 Feet (x2)  Assistive Device: Walker, rolling     PT Exercises  Exercise Treatment: Toilet transfer x1. PT requested for extended time. Call light in reach and nursing staff informed.   A/AROM Exercises: Seated B LE ex's x15- AROM  Dynamic Sitting Balance Exercises: Reaching OOBOS low/high ~2-3min           AM-[facility] Mobility Inpatient   How much difficulty turning over in bed?: A Little  How much difficulty sitting down on / standing up from a chair with arms?: A Little  How much difficulty moving from lying on back to sitting on side of bed?: A Little  How much help from another person moving to and from a bed to a chair?: A Little  How much help from another person needed to walk in hospital room?: A Little  How much help from another person for climbing 3-5 steps with a railing?: A Lot  AM-PAC Inpatient Mobility Raw Score : 17  AM-PAC Inpatient T-Scale Score : 42.13  Mobility Inpatient CMS 0-100% Score: 50.57  Mobility Inpatient CMS G-Code Modifier : CK    Goals  Short Term Goals  Time Frame for Short term goals: 5 days  Short term goal 1: pt to perform bed mobility with SBA  Short term goal 2: pt to transfer STS with Min A with [employer] and demonstrate good ecentric control with sitting  Short term goal 3: pt to ambulate 20 ft with [employer] and Min A  Short term goal 4: pt to be indep with seated HEP  Patient Goals   Patient goals : to be more independent    Education  Patient Education  Education Given To: Patient; Family  Education Provided: Role of Therapy;Plan of Care;Home Exercise Program;Precautions; Family Education; Fall Prevention Strategies  Education Method: Demonstration;Verbal  Barriers to Learning: None  Education Outcome: Verbalized understanding    Therapy Time   Individual Concurrent Group Co-treatment   Time In 1323         Time Out 1341         Minutes [place], [place]

## 2022-09-09 NOTE — CARE COORDINATION
Authorization obtained for patient to admit to [facility] on this date 9/9/22. [id number] [facility] transportation scheduled for tomorrow morning 9/10/22 @ 10:00am. Transport times were booked for the rest of this evening for wheelchair, patient does not qualify for stretcher transport. Please complete ANNALEE    Call to report: [phone]    Orders faxed to: [phone]      Electronically signed by Mary Skinner on 9/9/22 at 1:42 PM EDT     Addendum: SW notified patient of d/c time. Verbal consent obtained from patient for IMM 9/9/22 @ 2:21pm. 7000 completed.     Electronically signed by Mary Skinner on 9/9/22 at 2:27 PM EDT

## 2022-09-09 NOTE — PROGRESS NOTES
Transition of care note      Patient is a 23-year-old female PMH significant for Hx stroke, HFpEF, chronic DVT in bilateral lower extremities on Eliquis, hypertension who presented to the hospital s/p fall and was admitted for mechanical fall. Work-up including imaging is negative thus far. Patient is waiting for SNF placement.       Electronically signed by Radha Thompson MD on 9/9/2022 at 7:27 AM

## 2022-09-09 NOTE — PROGRESS NOTES
Physical Therapy        Physical Therapy Cancel Note      DATE: 2022    NAME: Ian Logan  MRN: [id number]   : 1950      Patient not seen this date for Physical Therapy due to: Other: Pt is working with OT upon arrival. Will check back later, time permitting.        Electronically signed by Chaim Newsome PTA on 2022 at 12:35 PM

## 2022-09-10 VITALS
OXYGEN SATURATION: 96 % | DIASTOLIC BLOOD PRESSURE: 72 MMHG | SYSTOLIC BLOOD PRESSURE: 128 MMHG | BODY MASS INDEX: 32.27 KG/M2 | RESPIRATION RATE: 18 BRPM | TEMPERATURE: 98.1 F | HEIGHT: 63 IN | WEIGHT: 182.1 LBS | HEART RATE: 85 BPM

## 2022-09-10 PROCEDURE — 6370000000 HC RX 637 (ALT 250 FOR IP): Performed by: NURSE PRACTITIONER

## 2022-09-10 PROCEDURE — 6370000000 HC RX 637 (ALT 250 FOR IP): Performed by: INTERNAL MEDICINE

## 2022-09-10 PROCEDURE — 99239 HOSP IP/OBS DSCHRG MGMT >30: CPT | Performed by: INTERNAL MEDICINE

## 2022-09-10 PROCEDURE — 2580000003 HC RX 258

## 2022-09-10 PROCEDURE — 6370000000 HC RX 637 (ALT 250 FOR IP)

## 2022-09-10 RX ORDER — TRAZODONE HYDROCHLORIDE 50 MG/1
TABLET ORAL
Qty: 30 TABLET | Refills: 0 | Status: SHIPPED | OUTPATIENT
Start: 2022-09-10 | End: 2022-10-10

## 2022-09-10 RX ADMIN — CITALOPRAM HYDROBROMIDE 20 MG: 20 TABLET ORAL at 08:39

## 2022-09-10 RX ADMIN — APIXABAN 5 MG: 5 TABLET, FILM COATED ORAL at 08:39

## 2022-09-10 RX ADMIN — BUSPIRONE HYDROCHLORIDE 5 MG: 5 TABLET ORAL at 08:39

## 2022-09-10 RX ADMIN — FUROSEMIDE 20 MG: 20 TABLET ORAL at 08:39

## 2022-09-10 RX ADMIN — FERROUS SULFATE TAB 325 MG (65 MG ELEMENTAL FE) 325 MG: 325 (65 FE) TAB at 08:39

## 2022-09-10 RX ADMIN — PANTOPRAZOLE SODIUM 40 MG: 40 TABLET, DELAYED RELEASE ORAL at 06:13

## 2022-09-10 RX ADMIN — HYDROCODONE BITARTRATE AND ACETAMINOPHEN 1 TABLET: 5; 325 TABLET ORAL at 03:52

## 2022-09-10 RX ADMIN — FENOFIBRATE 160 MG: 160 TABLET ORAL at 08:39

## 2022-09-10 RX ADMIN — SODIUM CHLORIDE, PRESERVATIVE FREE 10 ML: 5 INJECTION INTRAVENOUS at 08:41

## 2022-09-10 RX ADMIN — CARVEDILOL 12.5 MG: 12.5 TABLET, FILM COATED ORAL at 08:39

## 2022-09-10 ASSESSMENT — ENCOUNTER SYMPTOMS
RHINORRHEA: 0
ABDOMINAL PAIN: 0
VOMITING: 0
COUGH: 0
NAUSEA: 0
ABDOMINAL DISTENTION: 0
BACK PAIN: 0
CONSTIPATION: 0
CHOKING: 0
CHEST TIGHTNESS: 0
DIARRHEA: 0
COLOR CHANGE: 1
SHORTNESS OF BREATH: 0
WHEEZING: 0

## 2022-09-10 ASSESSMENT — PAIN DESCRIPTION - LOCATION: LOCATION: HAND;BACK

## 2022-09-10 ASSESSMENT — PAIN SCALES - GENERAL: PAINLEVEL_OUTOF10: 6

## 2022-09-10 NOTE — CARE COORDINATION
AMELIA redid paperwork for transport as the paperwork was not at nurses station.     ADD: AMELIA faxed ANNALEE/AVS to Isaac Puri at [phone]

## 2022-09-10 NOTE — DISCHARGE SUMMARY
[place] [address] [facility]    Discharge Summary     Patient ID: Mark Lyle  :  1950   MRN: [id number]     ACCOUNT:  [de-identified]   Patient's PCP: Domingo Pena MD  Admit Date: 2022   Discharge Date: 9/10/2022    Length of Stay: 4  Code Status:  Full Code  Admitting Physician: Eyal Vicente MD  Discharge Physician: Lexus Bueno MD     Active Discharge Diagnoses:     Primary Problem  Fall as cause of accidental injury in home as place of occurrence, initial encounter      [place] Problems    Diagnosis Date Noted    Fall as cause of accidental injury in home as place of occurrence, initial encounter [W19. Taj Mendoza, R69.711] 2022     Priority: Medium       Admission Condition:  fair     Discharged Condition: fair    Hospital Stay:     Hospital Course:  Mark Lyle is a 71 y.o. female who was admitted for the management of Fall as cause of accidental injury in home as place of occurrence, initial encounter , presented to ER with Fall and Hip Pain (Right)    The patient is a 71 y.o. Non- / non  female with a history of stroke, CHFpEF, chronic DVT in bilateral lower limbs on Eliquis, obesity, recurrent right leg cellulitis, MI, hypertension, hyperlipidemia, restless leg syndrome, depression presents to the clinic after suffering a fall 2 days ago. The patient was ambulating around her home when she lost balance and fell down on her right elbow and right head. The patient normally uses a walker and wheelchair, however, it unclear if she was using the walker at the time of the fall. She denies any dizziness, light-headedness, and vision changes at the time of the fall. The fall was unwitnessed. She denies loosing consciousness. The patient reports several falls over the past year. She lives at home with her , however he is ill  and unable to help her at home.  The patient reports right elbow bruising and pain and iterative reconstruction, and/or weight based adjustment of the mA/kV was utilized to reduce the radiation dose to as low as reasonably achievable.; CT of the face was performed without the administration of intravenous contrast. Multiplanar reformatted images are provided for review. Automated exposure control, iterative reconstruction, and/or weight based adjustment of the mA/kV was utilized to reduce the radiation dose to as low as reasonably achievable. COMPARISON: 07/10/2022 HISTORY: ORDERING SYSTEM PROVIDED HISTORY: fall, head trauma, on anticoagulation FINDINGS: CT HEAD: BRAIN/VENTRICLES: There is no acute intracranial hemorrhage, mass effect or midline shift. No abnormal extra-axial fluid collection. The gray-white differentiation is maintained without evidence of an acute infarct. There is no evidence of hydrocephalus. Mild generalized parenchymal volume loss and mild chronic small vessel disease. CT FACIAL BONES: FACIAL BONES: The maxilla, pterygoid plates and zygomatic arches are intact. The mandible is intact. The mandibular condyles are normally situated with similar advanced degenerative change involving the right TMJ. The nasal bones and maxillary nasal processes are intact. ORBITS: The globes appear intact. The extraocular muscles, optic nerve sheath complexes and lacrimal glands appear unremarkable. No retrobulbar hematoma or mass is seen. The orbital walls and rims are intact. Bilateral pseudophakia. SINUSES/MASTOIDS:  No acute fracture is seen. Sequelae of prior endoscopic sinus surgery with bilateral antrostomy and partial ethmoidectomies. Mild mucosal thickening involving floor of the maxillary sinuses with unchanged benign heterotopic ossification within the right maxillary sinus. SOFT TISSUES: No acute abnormality of the visualized skull or soft tissues. No acute intracranial abnormality. No acute traumatic injury of the facial bones.      CT FACIAL BONES WO CONTRAST    Result ossification within the right maxillary sinus. SOFT TISSUES: No acute abnormality of the visualized skull or soft tissues. No acute intracranial abnormality. No acute traumatic injury of the facial bones. XR HIP 2-3 VW W PELVIS RIGHT    Result Date: 9/6/2022  EXAMINATION: ONE XRAY VIEW OF THE PELVIS AND TWO XRAY VIEWS RIGHT HIP 9/6/2022 9:29 am COMPARISON: CT 07/20/2022 HISTORY: ORDERING SYSTEM PROVIDED HISTORY: fall, pain TECHNOLOGIST PROVIDED HISTORY: fall, pain Reason for Exam: fall, pain FINDINGS: There is diffuse demineralization, which limits evaluation for acute fracture. The hip demonstrates normal alignment. No evidence of acute fracture. No focal osseus lesion. Pelvis is intact. Partially visualized postsurgical changes of the lumbar spine. Bowel gas obscures the sacrum and pubic symphysis. No acute osseous abnormality. Given the degree of osteopenia, nondisplaced fractures may be radiographically occult. If pain or concern for fracture persists, consider MR imaging. Consultations:    Consults:     Final Specialist Recommendations/Findings:   IP CONSULT TO INTERNAL MEDICINE  IP CONSULT TO SOCIAL WORK      The patient was seen and examined on day of discharge and this discharge summary is in conjunction with any daily progress note from day of discharge. Discharge plan:     Disposition: snf     Physician Follow Up:     No follow-up provider specified. Requiring Further Evaluation/Follow Up POST HOSPITALIZATION/Incidental Findings:    Diet: cardiac diet    Activity: As tolerated    Instructions to Patient:     Discharge Medications:      Medication List        START taking these medications      HYDROcodone-acetaminophen 5-325 MG per tablet  Commonly known as: NORCO  Take 1 tablet by mouth every 8 hours as needed for Pain for up to 3 days.      polyethylene glycol 17 g packet  Commonly known as: GLYCOLAX  Take 17 g by mouth daily as needed for Constipation            CHANGE how you take these medications      atorvastatin 20 MG tablet  Commonly known as: LIPITOR  Take 1 tablet by mouth nightly  What changed:   medication strength  how much to take  Another medication with the same name was removed. Continue taking this medication, and follow the directions you see here. CONTINUE taking these medications      apixaban 5 MG Tabs tablet  Commonly known as: Eliquis  Take 1 tablet by mouth 2 times daily     blood glucose test strips  Test 2 times a day & as needed for symptoms of irregular blood glucose. busPIRone 5 MG tablet  Commonly known as: BUSPAR  Take 1 tablet by mouth 3 times daily     Calcium Carbonate-Vitamin D 500-125 MG-UNIT Tabs     carvedilol 12.5 MG tablet  Commonly known as: COREG  Take 1 tablet by mouth 2 times daily     citalopram 20 MG tablet  Commonly known as: CELEXA  Take 1 tablet by mouth daily     cyanocobalamin 1000 MCG tablet  Commonly known as: [employer]S VITAMIN B12  Take 1 tablet by mouth daily     fenofibrate micronized 134 MG capsule  Commonly known as: LOFIBRA  Take 1 capsule by mouth every morning (before breakfast)     ferrous sulfate 325 (65 Fe) MG tablet  Commonly known as: IRON 325     furosemide 20 MG tablet  Commonly known as: LASIX  Take 1 tablet by mouth daily     gabapentin 100 MG capsule  Commonly known as: NEURONTIN  Take 2 capsules by mouth 2 times daily for 30 days.      Hydrocerin Crea cream  Apply topically 2 times daily     Lancets Misc  1 each by Does not apply route daily     melatonin 3 MG Tabs tablet  Take 2 tablets by mouth nightly as needed (insomnia)     nitroGLYCERIN 0.4 MG SL tablet  Commonly known as: Nitrostat  Place 1 tablet under the tongue every 5 minutes as needed for Chest pain     pantoprazole 40 MG tablet  Commonly known as: PROTONIX  Take 1 tablet by mouth every morning (before breakfast)     pramipexole 0.5 MG tablet  Commonly known as: Mirapex  Take 1 tablet by mouth nightly     vitamin D 25 MCG (1000 UT) Tabs tablet  Commonly known as: CHOLECALCIFEROL            STOP taking these medications      acetaminophen 325 MG tablet  Commonly known as: TYLENOL     albuterol-ipratropium  MCG/ACT Aers inhaler  Commonly known as: COMBIVENT RESPIMAT     amLODIPine 5 MG tablet  Commonly known as: Norvasc            ASK your doctor about these medications      traZODone 50 MG tablet  Commonly known as: DESYREL  TAKE 1 TABLET BY MOUTH EVERY NIGHT AS NEEDED FOR SLEEP  Ask about: Which instructions should I use? Where to Get Your Medications        These medications were sent to [address] [place], [place] [address]Rui Singh [address] [place] [id number]      Phone: [phone]   atorvastatin 20 MG tablet  polyethylene glycol 17 g packet  traZODone 50 MG tablet       You can get these medications from any pharmacy    Bring a paper prescription for each of these medications  HYDROcodone-acetaminophen 5-325 MG per tablet         Time Spent on discharge is  35 mins in patient examination, evaluation, counseling as well as medication reconciliation, prescriptions for required medications, discharge plan and follow up. Electronically signed by   Tiffany Smith MD  9/10/2022  12:04 PM      Thank you Dr. Yris Wiggins MD for the opportunity to be involved in this patient's care.

## 2022-09-10 NOTE — DISCHARGE INSTR - DIET

## 2022-09-10 NOTE — PROGRESS NOTES
was admitted for management of fracture to body of sternum following a fall and right leg cellulitis. She received IV antibiotics and was discharged to an extended care facility. During her stay, the patient fell and sustained a left metacarpal neck fracture. She follows up with [facility] plastic surgeons for treatment of the fracture. The ED, the patient was vitally stable, however BP was elevated (195/66). X-ray of hip and pelvis on right showed no acute osseous abnormality. X-ray of right elbow was negative. CBC was unremarkable. CT head without contrast showed no acute intracranial abnormality. CT facial bones without contrast showed no acute intracranial abnormality. Glucose was 120. The patient was admitted for investigation of fall. Review of Systems:     Review of Systems   Constitutional:  Negative for activity change, chills, diaphoresis, fatigue and fever. HENT:  Negative for congestion and rhinorrhea. Respiratory:  Negative for cough, choking, chest tightness, shortness of breath and wheezing. Gastrointestinal:  Negative for abdominal distention, abdominal pain, constipation, diarrhea, nausea and vomiting. Endocrine: Negative for cold intolerance and heat intolerance. Genitourinary:  Negative for dysuria, frequency and urgency. Musculoskeletal:  Negative for arthralgias, back pain and myalgias. Skin:  Positive for color change. Neurological:  Negative for dizziness, tremors, syncope, weakness, light-headedness and headaches. Psychiatric/Behavioral:  Negative for agitation and confusion. Medications: Allergies:     Allergies   Allergen Reactions    Bactrim [Sulfamethoxazole-Trimethoprim] Other (See Comments)     confusion    Codeine Itching    Diazepam Other (See Comments)    Meperidine Hcl Other (See Comments)    Seasonal        Current Meds:   Scheduled Meds:    carvedilol  12.5 mg Oral BID WC    apixaban  5 mg Oral BID    busPIRone  5 mg Oral TID    citalopram 20 mg Oral Daily    fenofibrate  160 mg Oral Daily    furosemide  20 mg Oral Daily    pantoprazole  40 mg Oral QAM AC    pramipexole  0.5 mg Oral Nightly    sodium chloride flush  5-40 mL IntraVENous 2 times per day    ferrous sulfate  325 mg Oral Daily with breakfast    atorvastatin  20 mg Oral Nightly     Continuous Infusions:    sodium chloride       PRN Meds: albuterol-ipratropium, melatonin, sodium chloride flush, sodium chloride, ondansetron **OR** ondansetron, polyethylene glycol, acetaminophen **OR** acetaminophen, potassium chloride **OR** potassium alternative oral replacement **OR** potassium chloride, HYDROcodone 5 mg - acetaminophen    Data:     Past Medical History:   has a past medical history of Allergic rhinitis, cause unspecified, Back pain, Bowel obstruction (HCC), C. difficile diarrhea, CAD (coronary artery disease), Cardiac murmur, Cellulitis, Cellulitis, Cerebral artery occlusion with cerebral infarction ([facility] [address] COVID-19, Diverticulosis of colon (without mention of hemorrhage), GERD (gastroesophageal reflux disease), GERD (gastroesophageal reflux disease), History of blood transfusion, History of CHF (congestive heart failure), History of MI (myocardial infarction), History of ovarian cyst, History of peritonitis, HTN (hypertension), Hx of blood clots, Hyperlipidemia, Intestinal or peritoneal adhesions with obstruction (postoperative) (postinfection) ([facility] [address] Kidney infection, Lateral epicondylitis  of elbow, MDRO (multiple drug resistant organisms) resistance, Muscle strain, Other abnormal glucose, PONV (postoperative nausea and vomiting), Pre-diabetes, Restless legs syndrome (RLS), Snores, 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[facility] [address] TIA (transient ischemic attack), Uses walker, Vitamin D deficiency, Wears glasses, and Wellness examination. Social History:   reports that she quit smoking about 5 years ago. Her smoking use included cigarettes. She started smoking about 27 years ago.  She has a 10.00 pack-year smoking history. She has never used smokeless tobacco. She reports that she does not drink alcohol and does not use drugs. Family History:   Family History   Problem Relation Age of Onset    Stroke Mother     Diabetes Mother     Heart Disease Mother     High Blood Pressure Mother     Heart Disease Father     Heart Disease Brother     High Blood Pressure Brother     Heart Disease Maternal Grandmother     High Blood Pressure Sister        Vitals:  /72   Pulse 85   Temp 98.1 °F (36.7 °C) (Oral)   Resp 18   Ht 5' 3\" (1.6 m)   Wt 182 lb 1.6 oz (82.6 kg)   SpO2 96%   BMI 32.26 kg/m²   Temp (24hrs), Av.9 °F (36.6 °C), Min:97.5 °F (36.4 °C), Max:98.1 °F (36.7 °C)    No results for input(s): POCGLU in the last 72 hours. I/O(24Hr): No intake or output data in the 24 hours ending 09/10/22 1200      Labs:  [unfilled]    Lab Results   Component Value Date/Time    SPECIAL NOT REPORTED 2021 09:58 AM     Lab Results   Component Value Date/Time    CULTURE NO GROWTH 5 DAYS 2022 07:40 PM       [unfilled]    Radiology:    XR ELBOW RIGHT (MIN 3 VIEWS)    Result Date: 2022  EXAMINATION: THREE XRAY VIEWS OF THE RIGHT ELBOW 2022 9:29 am COMPARISON: None. HISTORY: ORDERING SYSTEM PROVIDED HISTORY: fall, pain TECHNOLOGIST PROVIDED HISTORY: fall, pain Reason for Exam: fall, pain FINDINGS: No fracture. No dislocation. No sizable elbow joint effusion. No destructive or blastic lesion. Mild degenerative changes, best seen coronoid process, radial head and distal humeral epicondyles. Mineralization WNL. No fracture or dislocation. XR WRIST LEFT (MIN 3 VIEWS)    Result Date: 2022  X-rays taken in clinic and preliminarily reviewed by me 22: 3 views of the left wrist demonstrate a comminuted, shortened significant dorsal angulation of the left fifth metacarpal neck/shaft fracture. Patient with evidence of old injury to the ulnar styloid.   Volar plate is present from old distal radius injury    CT HEAD WO CONTRAST    Result Date: 9/6/2022  EXAMINATION: CT OF THE HEAD WITHOUT CONTRAST; CT OF THE FACE WITHOUT CONTRAST 9/6/2022 11:08 am; 9/6/2022 11:09 am TECHNIQUE: CT of the head was performed without the administration of intravenous contrast. Automated exposure control, iterative reconstruction, and/or weight based adjustment of the mA/kV was utilized to reduce the radiation dose to as low as reasonably achievable.; CT of the face was performed without the administration of intravenous contrast. Multiplanar reformatted images are provided for review. Automated exposure control, iterative reconstruction, and/or weight based adjustment of the mA/kV was utilized to reduce the radiation dose to as low as reasonably achievable. COMPARISON: 07/10/2022 HISTORY: ORDERING SYSTEM PROVIDED HISTORY: fall, head trauma, on anticoagulation FINDINGS: CT HEAD: BRAIN/VENTRICLES: There is no acute intracranial hemorrhage, mass effect or midline shift. No abnormal extra-axial fluid collection. The gray-white differentiation is maintained without evidence of an acute infarct. There is no evidence of hydrocephalus. Mild generalized parenchymal volume loss and mild chronic small vessel disease. CT FACIAL BONES: FACIAL BONES: The maxilla, pterygoid plates and zygomatic arches are intact. The mandible is intact. The mandibular condyles are normally situated with similar advanced degenerative change involving the right TMJ. The nasal bones and maxillary nasal processes are intact. ORBITS: The globes appear intact. The extraocular muscles, optic nerve sheath complexes and lacrimal glands appear unremarkable. No retrobulbar hematoma or mass is seen. The orbital walls and rims are intact. Bilateral pseudophakia. SINUSES/MASTOIDS:  No acute fracture is seen. Sequelae of prior endoscopic sinus surgery with bilateral antrostomy and partial ethmoidectomies.   Mild mucosal thickening involving floor of the maxillary sinuses with unchanged benign heterotopic ossification within the right maxillary sinus. SOFT TISSUES: No acute abnormality of the visualized skull or soft tissues. No acute intracranial abnormality. No acute traumatic injury of the facial bones. CT FACIAL BONES WO CONTRAST    Result Date: 9/6/2022  EXAMINATION: CT OF THE HEAD WITHOUT CONTRAST; CT OF THE FACE WITHOUT CONTRAST 9/6/2022 11:08 am; 9/6/2022 11:09 am TECHNIQUE: CT of the head was performed without the administration of intravenous contrast. Automated exposure control, iterative reconstruction, and/or weight based adjustment of the mA/kV was utilized to reduce the radiation dose to as low as reasonably achievable.; CT of the face was performed without the administration of intravenous contrast. Multiplanar reformatted images are provided for review. Automated exposure control, iterative reconstruction, and/or weight based adjustment of the mA/kV was utilized to reduce the radiation dose to as low as reasonably achievable. COMPARISON: 07/10/2022 HISTORY: ORDERING SYSTEM PROVIDED HISTORY: fall, head trauma, on anticoagulation FINDINGS: CT HEAD: BRAIN/VENTRICLES: There is no acute intracranial hemorrhage, mass effect or midline shift. No abnormal extra-axial fluid collection. The gray-white differentiation is maintained without evidence of an acute infarct. There is no evidence of hydrocephalus. Mild generalized parenchymal volume loss and mild chronic small vessel disease. CT FACIAL BONES: FACIAL BONES: The maxilla, pterygoid plates and zygomatic arches are intact. The mandible is intact. The mandibular condyles are normally situated with similar advanced degenerative change involving the right TMJ. The nasal bones and maxillary nasal processes are intact. ORBITS: The globes appear intact. The extraocular muscles, optic nerve sheath complexes and lacrimal glands appear unremarkable.   No retrobulbar hematoma or mass is seen. The orbital walls and rims are intact. Bilateral pseudophakia. SINUSES/MASTOIDS:  No acute fracture is seen. Sequelae of prior endoscopic sinus surgery with bilateral antrostomy and partial ethmoidectomies. Mild mucosal thickening involving floor of the maxillary sinuses with unchanged benign heterotopic ossification within the right maxillary sinus. SOFT TISSUES: No acute abnormality of the visualized skull or soft tissues. No acute intracranial abnormality. No acute traumatic injury of the facial bones. XR HIP 2-3 VW W PELVIS RIGHT    Result Date: 9/6/2022  EXAMINATION: ONE XRAY VIEW OF THE PELVIS AND TWO XRAY VIEWS RIGHT HIP 9/6/2022 9:29 am COMPARISON: CT 07/20/2022 HISTORY: ORDERING SYSTEM PROVIDED HISTORY: fall, pain TECHNOLOGIST PROVIDED HISTORY: fall, pain Reason for Exam: fall, pain FINDINGS: There is diffuse demineralization, which limits evaluation for acute fracture. The hip demonstrates normal alignment. No evidence of acute fracture. No focal osseus lesion. Pelvis is intact. Partially visualized postsurgical changes of the lumbar spine. Bowel gas obscures the sacrum and pubic symphysis. No acute osseous abnormality. Given the degree of osteopenia, nondisplaced fractures may be radiographically occult. If pain or concern for fracture persists, consider MR imaging. Physical Examination:        Physical Exam  Constitutional:       General: She is not in acute distress. Appearance: Normal appearance. HENT:      Nose: No congestion or rhinorrhea. Eyes:      Extraocular Movements: Extraocular movements intact. Conjunctiva/sclera: Conjunctivae normal.      Pupils: Pupils are equal, round, and reactive to light. Cardiovascular:      Rate and Rhythm: Normal rate and regular rhythm. Pulses: Normal pulses. Heart sounds: Normal heart sounds. No murmur heard. No friction rub. No gallop.    Pulmonary:      Effort: Pulmonary effort is normal. No respiratory distress. Breath sounds: Normal breath sounds. No wheezing or rales. Abdominal:      General: Abdomen is flat. Bowel sounds are normal. There is no distension. Tenderness: There is no abdominal tenderness. There is no guarding. Musculoskeletal:      Right lower leg: No edema. Left lower leg: No edema. Skin:     Capillary Refill: Capillary refill takes less than 2 seconds. Coloration: Skin is not jaundiced or pale. Findings: Bruising present. Neurological:      General: No focal deficit present. Mental Status: She is alert and oriented to person, place, and time. Cranial Nerves: No cranial nerve deficit. Sensory: No sensory deficit. Psychiatric:         Mood and Affect: Mood normal.         Assessment:        Primary Problem  Fall as cause of accidental injury in home as place of occurrence, initial encounter    Active Hospital Problems    Diagnosis Date Noted    Fall as cause of accidental injury in home as place of occurrence, initial encounter [W19Nickie Lara, G34.898] 09/06/2022     Priority: Medium       Plan:        Investigation of fall  -X-ray of hip and pelvis on right: no acute osseous abnormality  -X-ray of right elbow was negative  -CBC was unremarkable  -BUN was elevated at 25, baseline is 18  -CT head without contrast showed no acute intracranial abnormality. -CT facial bones without contrast showed no acute intracranial abnormality.   -Fall precautions in place  -Neurovascular checks        2. Hypertension  -BP today: 149/68  -Continue Coreg 12.5 mg po daily  -Continue Lasix 20 mg po daily   -Amlodipine 5 mg on hold         3. Hyperlipidemia  -Continue lipitor 40 mg po daily  -Continue fenofibrate 160 mg po daily         4. Depression  -Continue Buspar 5 mg po TID  -Continue Celexa 20 mg po daily        5. CHFpEF  -Echo 4/8/22: Normal LVEF (>55%), Grade I (mild) LV diastolic dysfunction. Mild TR  -Continue Lasix 20 mg po daily        6.  Restless leg syndrome  -Continue pramipexole 0.5 mg po nightly         7. Chronic DVT of bilateral lower limbs  -Continue Eliquis 5 mg po BID  -bilateral compression stockings         PT/OT consulted  Diet: Normal adult diet; low sodium  Code: Full code  DVT prophylaxis: Eliquis 5 mg po BID  Social work consulted     9/9/22    Transfer from [facility] . To snf       Patient is a 59-year-old female PMH significant for Hx stroke, HFpEF, chronic DVT in bilateral lower extremities on Eliquis, hypertension who presented to the hospital s/p fall and was admitted for mechanical fall. Work-up including imaging is negative thus far.   Patient is waiting for SNF placement          9/10/22    To snf              Renetta Estes MD  9/10/2022  12:00 PM

## 2022-09-12 ENCOUNTER — TELEPHONE (OUTPATIENT)
Dept: INTERNAL MEDICINE CLINIC | Age: 72
End: 2022-09-12

## 2022-09-12 ENCOUNTER — CARE COORDINATION (OUTPATIENT)
Dept: CARE COORDINATION | Age: 72
End: 2022-09-12

## 2022-09-12 NOTE — CARE COORDINATION
Noted:  Patient was transferred to SNF [facility]) from hospital, will remove self from care team.  Kevin Hernández RN, ambulatory care manager

## 2022-09-12 NOTE — TELEPHONE ENCOUNTER
[facility] Transitions Initial Follow Up Call    Outreach made within 2 business days of discharge: Yes    Patient: Sixto Calhoun Patient : 1950   MRN: [id number]  Reason for Admission: There are no discharge diagnoses documented for the most recent discharge.   Discharge Date: 9/10/22       Spoke with: Unable to contact, Left message on machine for patient to return call     Discharge department/facility: [address] [place] [address]         Scheduled appointment with PCP within 7-14 days    Follow Up  Future Appointments   Date Time Provider Britni Marshall   2022 10:30 AM Cierra Gill MD [facility] None   2022 12:00 PM Andre Blackmon MD Neuro Spec [facility]   2022  3:30 PM Americo Cantu MD [address] [facility], [place]

## 2022-10-03 ENCOUNTER — TELEPHONE (OUTPATIENT)
Dept: INTERNAL MEDICINE CLINIC | Age: 72
End: 2022-10-03

## 2022-10-03 ENCOUNTER — CARE COORDINATION (OUTPATIENT)
Dept: CASE MANAGEMENT | Age: 72
End: 2022-10-03

## 2022-10-03 DIAGNOSIS — F32.A DEPRESSION, UNSPECIFIED DEPRESSION TYPE: ICD-10-CM

## 2022-10-03 DIAGNOSIS — G25.81 RLS (RESTLESS LEGS SYNDROME): ICD-10-CM

## 2022-10-03 RX ORDER — CITALOPRAM 20 MG/1
20 TABLET ORAL DAILY
Qty: 30 TABLET | Refills: 0 | Status: SHIPPED | OUTPATIENT
Start: 2022-10-03 | End: 2022-10-20 | Stop reason: SDUPTHER

## 2022-10-03 RX ORDER — PRAMIPEXOLE DIHYDROCHLORIDE 0.5 MG/1
0.5 TABLET ORAL NIGHTLY
Qty: 30 TABLET | Refills: 0 | Status: SHIPPED | OUTPATIENT
Start: 2022-10-03

## 2022-10-03 RX ORDER — PRAMIPEXOLE DIHYDROCHLORIDE 0.5 MG/1
TABLET ORAL
Qty: 90 TABLET | OUTPATIENT
Start: 2022-10-03

## 2022-10-03 RX ORDER — PANTOPRAZOLE SODIUM 40 MG/1
40 TABLET, DELAYED RELEASE ORAL
Qty: 30 TABLET | Refills: 3 | Status: SHIPPED | OUTPATIENT
Start: 2022-10-03

## 2022-10-03 RX ORDER — ATORVASTATIN CALCIUM 20 MG/1
TABLET, FILM COATED ORAL
Qty: 90 TABLET | OUTPATIENT
Start: 2022-10-03

## 2022-10-03 RX ORDER — ATORVASTATIN CALCIUM 20 MG/1
20 TABLET, FILM COATED ORAL NIGHTLY
Qty: 30 TABLET | Refills: 3 | Status: SHIPPED | OUTPATIENT
Start: 2022-10-03 | End: 2022-10-20 | Stop reason: SDUPTHER

## 2022-10-03 RX ORDER — PANTOPRAZOLE SODIUM 40 MG/1
TABLET, DELAYED RELEASE ORAL
Qty: 90 TABLET | OUTPATIENT
Start: 2022-10-03

## 2022-10-03 NOTE — TELEPHONE ENCOUNTER
Attempted to contact patient to schedule an appointment, no answer, left message on machine to call the office back and we can get her an appointment scheduled

## 2022-10-03 NOTE — CARE COORDINATION
[facility] Discharge Call    10/3/2022    Patient: Anthony Schrader Patient : 1950   MRN: [id number]  Reason for Admission: fall, cellulitis  Discharge Date: 9/10/22 RARS: Readmission Risk Score: 21.8         Discharge Facility: [facility]    Acute Care Course:    [facility] with a closed reduction of her L little finger   to 9/10 st [facility] after a fall and cellulitis  9/10 tp  [facility] d/c to home with [facility]    HFU made:  None   with Dr Jodie Rizvi for finger    Sig Hx:   CHF, DVT on eliquis, cellulitis, HTN, RLS, chronic cough, CVA hs, IBS, falls. Lives with spouse who is unable to help with her care    DME: walker and w/c    Conversation:   Spoke to Anjali Chantell after 2 IDs. Pt is doing \"good \" and is having PT and someone else coming out today from [facility]. She then hung up on me and I was unable to reach her after she hung up on me. Will try to call again. Follow up plan: Will re-attempt             Care Transitions Post Acute Facility Transition      Do you have all of your prescriptions and are they filled?: Yes (Comment: waiting for a couple from her mail order pharmacy)         Do you have support at home?: Partner/Spouse/SO   Patient DME: Felicitas Cooper, Other   Other Patient DME: trevor sin   Care Transitions Interventions         Future Appointments   Date Time Provider Britni Marshall   10/17/2022  1:15 PM Young Tinajero MD [facility] None   2022  1:00 PM Juancarlos Alan MD [address]     Transitions of Care Initial Call    Was this an external facility discharge?  Yes, 22  Discharge Facility: [facility], RN   [address] [place] [address] Transition Nurse  [phone]

## 2022-10-03 NOTE — TELEPHONE ENCOUNTER
----- Message from [address] sent at 10/3/2022  1:04 PM EDT -----  Subject: Message to Provider    QUESTIONS  Information for Provider? PT called to schedule appt. f/u appt follow   surgery on left hand x 5wks ago. Please f/u.  ---------------------------------------------------------------------------  --------------  Jaime [facility]  [phone]; OK to leave message on voicemail  ---------------------------------------------------------------------------  --------------  SCRIPT ANSWERS  Relationship to Patient?  Self

## 2022-10-03 NOTE — TELEPHONE ENCOUNTER
----- Message from [address] sent at 10/3/2022  1:02 PM EDT -----  Subject: Refill Request    QUESTIONS  Name of Medication? pantoprazole (PROTONIX) 40 MG tablet  Patient-reported dosage and instructions? 40 mg. Take 1 daily  How many days do you have left? 0  Preferred Pharmacy? [facility] phone number (if available)? [phone]  ---------------------------------------------------------------------------  --------------,  Name of Medication? Other - Atorvastatin calcuim  Patient-reported dosage and instructions? 80mg. Take 1/2 tablet each night  How many days do you have left? 12  Preferred Pharmacy? [facility] phone number (if available)? [phone]  ---------------------------------------------------------------------------  --------------,  Name of Medication? Other - citalopram hydrobromide 20 mg  Patient-reported dosage and instructions? 20 mg. Take 1 tablet once daily  How many days do you have left? 3  Preferred Pharmacy? [facility] phone number (if available)? [phone]  ---------------------------------------------------------------------------  --------------,  Name of Medication? Other - pramipexole dr hydrochloride 0.5 mg  Patient-reported dosage and instructions? 0.5 mg. Take one tablet by mouth   each night daily  How many days do you have left? 0  Preferred Pharmacy? [facility]  Pharmacy phone number (if available)? [phone]  ---------------------------------------------------------------------------  --------------  CALL BACK INFO  What is the best way for the office to contact you? OK to leave message on   voicemail  Preferred Call Back Phone Number? [phone]  ---------------------------------------------------------------------------  --------------  SCRIPT ANSWERS  Relationship to Patient?  Self

## 2022-10-04 ENCOUNTER — CARE COORDINATION (OUTPATIENT)
Dept: CASE MANAGEMENT | Age: 72
End: 2022-10-04
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
## 2022-10-04 PROCEDURE — 1111F DSCHRG MED/CURRENT MED MERGE: CPT | Performed by: INTERNAL MEDICINE

## 2022-10-04 NOTE — CARE COORDINATION
[facility] Discharge Call    10/4/2022    Patient: Jacki Sloan Patient : 1950   MRN: [id number]  Reason for Admission: fall/ cellulits  Discharge Date: 9/10/22 RARS: Readmission Risk Score: 21.8         Discharge Facility: [facility]    Acute Care Course:    [facility] with a closed reduction of her L little finger   to 9/10 [facility] after a fall and cellulitis  9/10 tp  Bennett Man d/c to home with [facility]     HFU made:  Called with pt and made appt for 10/20  9/14 with Dr Allison Hunter for finger     Sig Hx:   CHF, DVT on eliquis, cellulitis, HTN, RLS, chronic cough, CVA hs, IBS, falls. Lives with spouse who is unable to help with her care     DME: walker and w/c     Conversation:   Spoke with pt after 2 IDs. Pt is doing well and [facility] is coming out to see her. Pt has some difficulty hearing so needed to speak loudly. She stated there was no appt with PCP and I stated that the office called this AM. She stated [facility] RN there this morning and she did not . Agreed to do a joint call with me. Pt walks with a walker and her daughter comes over 2 x/week and provides a SBA. She is able to get off toilet fine and get out of bed. She sleeps well and has a good appetite. She made meatloaf today and is able to cook. He  is able to  supplies. Reviewed meds including purpose and how to take. Agreeable to calls     Follow up plan:   Will hand off to Tasha Laguna, [facility] Transition      Do you have any ongoing symptoms?: No   Do you have all of your prescriptions and are they filled?: Yes   Do you have any questions related to your medications?: No   Have you scheduled your follow up appointment?: Yes (Comment: scheduled with pt for 10/20)   How are you going to get to your appointment?: Car - family or friend to transport   Were you discharged with any Home Care or Post Acute Services or do you currently have any active services?: Yes   Post Acute Services: Home Health (Comment: [facility])         Do you have support at home?: Partner/Spouse/SO   Do you feel like you have everything you need to keep you well at home?: Yes   Patient DME: Laly Rivers, Other   Other Patient DME: trevor sin   Care Transitions Interventions         Future Appointments   Date Time Provider Britni Marshall   10/17/2022  1:15 PM Nikki Mar MD [facility] None   10/20/2022  1:00 PM Dong Lan MD [address]   2022  1:00 PM Dayron Berry MD [address]     Transitions of Care Initial Call    Was this an external facility discharge? Yes, 22  Discharge Facility: [facility]    Challenges to be reviewed by the provider   Additional needs identified to be addressed with provider: Yes  Made appt             Method of communication with provider : phone    Advance Care Planning:   Does patient have an Advance Directive: reviewed and current. Care Transition Nurse contacted the patient by telephone to perform post hospital discharge assessment. Verified name and  with patient as identifiers. Provided introduction to self, and explanation of the CTN role. CTN reviewed discharge instructions, medical action plan and red flags with patient who verbalized understanding. Patient given an opportunity to ask questions and does not have any further questions or concerns at this time. Were discharge instructions available to patient? Yes. Reviewed appropriate site of care based on symptoms and resources available to patient including: PCP. The patient agrees to contact the PCP office for questions related to their healthcare. Medication reconciliation was performed with patient, who verbalizes understanding of administration of home medications. Advised obtaining a 90-day supply of all daily and as-needed medications.      Was patient discharged with a pulse oximeter? no    CTN provided contact information. Plan for follow-up call in 5-7 days based on severity of symptoms and risk factors.   Plan for next call: referral to ambulatory care SEGUN Torres, RN   [address] [place] [address] Transition Nurse  [phone]

## 2022-10-10 ENCOUNTER — CARE COORDINATION (OUTPATIENT)
Dept: CARE COORDINATION | Age: 72
End: 2022-10-10

## 2022-10-10 ENCOUNTER — OFFICE VISIT (OUTPATIENT)
Dept: INTERNAL MEDICINE CLINIC | Age: 72
End: 2022-10-10
Payer: MEDICARE

## 2022-10-10 VITALS
WEIGHT: 179 LBS | OXYGEN SATURATION: 97 % | HEIGHT: 63 IN | DIASTOLIC BLOOD PRESSURE: 82 MMHG | SYSTOLIC BLOOD PRESSURE: 138 MMHG | BODY MASS INDEX: 31.71 KG/M2 | HEART RATE: 76 BPM

## 2022-10-10 DIAGNOSIS — L03.115 CELLULITIS OF RIGHT LOWER EXTREMITY: ICD-10-CM

## 2022-10-10 DIAGNOSIS — R05.3 CHRONIC COUGH: ICD-10-CM

## 2022-10-10 DIAGNOSIS — E55.9 VITAMIN D DEFICIENCY: ICD-10-CM
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
## 2022-10-10 PROCEDURE — G8399 PT W/DXA RESULTS DOCUMENT: HCPCS | Performed by: INTERNAL MEDICINE

## 2022-10-10 PROCEDURE — 99214 OFFICE O/P EST MOD 30 MIN: CPT | Performed by: INTERNAL MEDICINE

## 2022-10-10 PROCEDURE — 1123F ACP DISCUSS/DSCN MKR DOCD: CPT | Performed by: INTERNAL MEDICINE

## 2022-10-10 PROCEDURE — 1111F DSCHRG MED/CURRENT MED MERGE: CPT | Performed by: INTERNAL MEDICINE

## 2022-10-10 PROCEDURE — 3017F COLORECTAL CA SCREEN DOC REV: CPT | Performed by: INTERNAL MEDICINE

## 2022-10-10 PROCEDURE — G8427 DOCREV CUR MEDS BY ELIG CLIN: HCPCS | Performed by: INTERNAL MEDICINE

## 2022-10-10 PROCEDURE — 1036F TOBACCO NON-USER: CPT | Performed by: INTERNAL MEDICINE

## 2022-10-10 PROCEDURE — G8484 FLU IMMUNIZE NO ADMIN: HCPCS | Performed by: INTERNAL MEDICINE

## 2022-10-10 PROCEDURE — 1090F PRES/ABSN URINE INCON ASSESS: CPT | Performed by: INTERNAL MEDICINE

## 2022-10-10 PROCEDURE — G8417 CALC BMI ABV UP PARAM F/U: HCPCS | Performed by: INTERNAL MEDICINE

## 2022-10-10 RX ORDER — DOXYCYCLINE HYCLATE 100 MG
100 TABLET ORAL 2 TIMES DAILY
Qty: 20 TABLET | Refills: 0 | Status: SHIPPED | OUTPATIENT
Start: 2022-10-10 | End: 2022-10-20

## 2022-10-10 RX ORDER — GABAPENTIN 100 MG/1
100 CAPSULE ORAL 2 TIMES DAILY
Qty: 60 CAPSULE | Refills: 0 | Status: SHIPPED | OUTPATIENT
Start: 2022-10-10 | End: 2022-11-09

## 2022-10-10 RX ORDER — AMOXICILLIN 500 MG/1
500 CAPSULE ORAL 3 TIMES DAILY
Qty: 30 CAPSULE | Refills: 0 | Status: SHIPPED | OUTPATIENT
Start: 2022-10-10 | End: 2022-10-20

## 2022-10-10 NOTE — PROGRESS NOTES
Visit Information    Have you changed or started any medications since your last visit including any over-the-counter medicines, vitamins, or herbal medicines? no   Are you having any side effects from any of your medications? -  no  Have you stopped taking any of your medications? Is so, why? -  no    Have you seen any other physician or provider since your last visit? No  Have you had any other diagnostic tests since your last visit? No  Have you been seen in the emergency room and/or had an admission to a hospital since we last saw you? No  Have you had your routine dental cleaning in the past 6 months? no    Have you activated your [employer] account? If not, what are your barriers?  Yes     Patient Care Team:  Dong Lan MD as PCP - General (Internal Medicine)  Dong Lan MD as PCP - [facility]  Noble Dong MD as Consulting Physician (Gastroenterology)  Ramses Kelly RN as [address] [place] [address] History Review  Past Medical, Family, and Social History reviewed and does contribute to the patient presenting condition    Health Maintenance   Topic Date Due    DTaP/Tdap/Td vaccine (1 - Tdap) Never done    Shingles vaccine (1 of 2) Never done    Diabetic microalbuminuria test  08/05/2016    Diabetic retinal exam  07/20/2021    COVID-19 Vaccine (3 - Booster for Pfizer series) 09/08/2021    Lipids  03/12/2022    Annual Wellness Visit (AWV)  05/10/2022    Flu vaccine (1) 08/01/2022    A1C test (Diabetic or Prediabetic)  02/01/2023    Diabetic foot exam  03/12/2023    Depression Monitoring  04/20/2023    Breast cancer screen  05/06/2023    Colorectal Cancer Screen  10/07/2026    DEXA (modify frequency per FRAX score)  Completed    Pneumococcal 65+ years Vaccine  Completed    Hepatitis C screen  Completed    Hepatitis A vaccine  Aged Out    Hib vaccine  Aged Out    Meningococcal (ACWY) vaccine  Aged Out

## 2022-10-10 NOTE — CARE COORDINATION
Ambulatory Care Coordination Note  10/10/2022    ACC: Amanda Rodríguez RN    CTN REFERRAL  Summary  Date Care Coordination Episode Started:  10.10.22  Week: 1      Reason patient is in Care Coordination:     CTN REFERRAL     Topics Discussed Today:     Recent admission to SNF following a fall and cellulitis. Patient is fearful that the cellulitis had returned and she has a follow up at PCP office today. Home health, patient verified that she has [facility]  Medications, no needs today. Assessments completed today:     Fall risk  Initial assessment  Medication reconciliation  SDOH  CHF (Health assessments)      Care Coordinator plan of care:     ACM will follow up in 3-5 days to check on needs. Patient was preparing to go to her appointment. Plan to discuss RPM.    Offered patient enrollment in the Remote Patient Monitoring (RPM) program for in-home monitoring:  patient had an appointment to get to, will discuss more later this week. .    Ambulatory Care Coordination Assessment    Care Coordination Protocol  Referral from Primary Care Provider: No  Week 1 - Initial Assessment     Do you have all of your prescriptions and are they filled?: Yes  Are you able to afford your medications?: Yes  How often do you have trouble taking your medications the way you have been told to take them?: I always take them as prescribed. Ability to seek help/take action for Emergent Urgent situations i.e. fire, crime, inclement weather or health crisis. : Independent  Ability to ambulate to restroom: Independent  Ability handle personal hygeine needs (bathing/dressing/grooming): Independent  Ability to manage Medications: Independent  Ability to prepare Food Preparation: Independent  Ability to maintain home (clean home, laundry): Independent  Ability to drive and/or has transportation: Independent  Ability to do shopping: Independent  Ability to manage finances:  Independent  Is patient able to live independently?: Yes     Current Housing: Private Residence              Are you experiencing loss of meaning?: No  Are you experiencing loss of hope and peace?: No     Thinking about your patient's physical health needs, are there any symptoms or problems (risk indicators) you are unsure about that require further investigation?: No identified areas of uncertainly or problems already being investigated   Are the patients physical health problems impacting on their mental well-being?: No identified areas of concern   Are there any problems with your patients lifestyle behaviors (alcohol, drugs, diet, exercise) that are impacting on physical or mental well-being?: No identified areas of concern   Do you have any other concerns about your patients mental well-being? How would you rate their severity and impact on the patient?: No identified areas of concern   How would you rate their home environment in terms of safety and stability (including domestic violence, insecure housing, neighbor harassment)?: Consistently safe, supportive, stable, no identified problems   How do daily activities impact on the patient's well-being? (include current or anticipated unemployment, work, caregiving, access to transportation or other): Some general dissatisfaction but no concern   How would you rate their social network (family, work, friends)?: Adequate participation with social networks   How would you rate their financial resources (including ability to afford all required medical care)?: Financially secure, resources adequate, no identified problems   How wells does the patient now understand their health and well-being (symptoms, signs or risk factors) and what they need to do to manage their health?: Reasonable to good understanding and already engages in managing health or is willing to undertake better management   How well do you think your patient can engage in healthcare discussions?  (Barriers include language, deafness, aphasia, alcohol or drug problems, learning difficulties, concentration): Clear and open communication, no identified barriers   Do other services need to be involved to help this patient?: Other care/services not in place and required   Are current services involved with this patient well-coordinated? (Include coordination with other services you are now recommendation): Required care/services missing and/or fragmented   Suggested Interventions and Community Resources  Fall Risk Prevention: In Process Home Health Services: Completed (Comment: [facility])   Zone Management Tools: In Process                  Prior to Admission medications    Medication Sig Start Date End Date Taking? Authorizing Provider   pantoprazole (PROTONIX) 40 MG tablet Take 1 tablet by mouth every morning (before breakfast) 10/3/22   Brian Franks MD   atorvastatin (LIPITOR) 20 MG tablet Take 1 tablet by mouth nightly 10/3/22   Brian Franks MD   citalopram (CELEXA) 20 MG tablet Take 1 tablet by mouth daily 10/3/22   Brian Franks MD   pramipexole (MIRAPEX) 0.5 MG tablet Take 1 tablet by mouth nightly 10/3/22   Brian Franks MD   traZODone (DESYREL) 50 MG tablet TAKE 1 TABLET BY MOUTH EVERY NIGHT AS NEEDED FOR SLEEP  Patient not taking: Reported on 10/4/2022 9/10/22   Devi Gordon MD   ferrous sulfate (IRON 325) 325 (65 Fe) MG tablet Take 325 mg by mouth daily (with breakfast)    Historical Provider, MD   vitamin D (CHOLECALCIFEROL) 25 MCG (1000 UT) TABS tablet Take 1,000 Units by mouth at bedtime    Historical Provider, MD   gabapentin (NEURONTIN) 100 MG capsule Take 2 capsules by mouth 2 times daily for 30 days. 8/22/22 10/4/22  Melody Boateng MD   apixaban (ELIQUIS) 5 MG TABS tablet Take 1 tablet by mouth 2 times daily 8/17/22   Melody Boateng MD   Skin Protectants, Misc.  (HYDROCERIN) CREA cream Apply topically 2 times daily  Patient not taking: Reported on 10/4/2022 7/18/22   Bernadette Mcnair MD   busPIRone (BUSPAR) 5 MG tablet Take 1 tablet by mouth 3 times daily 7/5/22   Kely Gastelum MD   furosemide (LASIX) 20 MG tablet Take 1 tablet by mouth daily 6/29/22   Kely Gastelum MD   fenofibrate micronized (LOFIBRA) 134 MG capsule Take 1 capsule by mouth every morning (before breakfast) 5/23/22   Kely Gastelum MD   carvedilol (COREG) 12.5 MG tablet Take 1 tablet by mouth 2 times daily 4/10/22   Mayco Johnson MD   albuterol-ipratropium (COMBIVENT RESPIMAT)  MCG/ACT AERS inhaler Inhale 1 puff into the lungs every 6 hours as needed for Wheezing or Shortness of Breath  Patient not taking: Reported on 9/6/2022 4/10/22 9/9/22  Mayco Johnson MD   amLODIPine (NORVASC) 5 MG tablet Take 1 tablet by mouth daily 4/10/22 7/21/22  Mayco Johnson MD   melatonin 3 MG TABS tablet Take 2 tablets by mouth nightly as needed (insomnia) 4/7/22   Mayco Johnson MD   acetaminophen (TYLENOL) 325 MG tablet Take 2 tablets by mouth every 4 hours as needed for Pain 4/7/22 9/9/22  Mayco Johnson MD   nitroGLYCERIN (NITROSTAT) 0.4 MG SL tablet Place 1 tablet under the tongue every 5 minutes as needed for Chest pain 9/1/21   Kely Gastelum MD   cyanocobalamin ([employer] VITAMIN B12) 1000 MCG tablet Take 1 tablet by mouth daily 12/3/20   Kely Gastelum MD   Lancets MISC 1 each by Does not apply route daily  Patient not taking: Reported on 10/4/2022 10/10/19   Arlet Wilson MD   blood glucose monitor strips Test 2 times a day & as needed for symptoms of irregular blood glucose.   Patient not taking: Reported on 10/4/2022 10/10/19   Arlet Wilson MD   Calcium Carbonate-Vitamin D 500-125 MG-UNIT TABS Take 1 tablet by mouth nightly     Historical Provider, MD       Future Appointments   Date Time Provider Britni Marshall   10/10/2022  3:00 PM Hermila Villanueva MD [address]   10/17/2022  1:15 PM JARRELL Hicks MD [facility] None   10/20/2022  1:00 PM Kely Gastelum MD [address]   12/7/2022  1:00 PM [id number]     ,   Congestive Heart Failure Assessment    Are you currently restricting fluids?: Other  Do you understand a low sodium diet?: Yes  Do you understand how to read food labels?: Yes  Do you salt your food before tasting it?: No         Symptoms:          , and   General Assessment    Do you have any symptoms that are causing concern?: Yes  Progression since Onset: Gradually Worsening  Reported Symptoms: Other (Comment: per patient cellulitis is back)

## 2022-10-10 NOTE — PROGRESS NOTES
[address] [address]  Dept: [phone]  Dept Fax: [phone]    Office Progress/Follow Up Note  Date of patient's visit: 10/10/2022  Patient's Name:  Navya Alcala YOB: 1950            Patient Care Team:  Dejuan Snyder MD as PCP - General (Internal Medicine)  Dejuan Snyder MD as PCP - [facility]  Robert Duffy MD as Consulting Physician (Gastroenterology)  Melly Mathews RN as Ambulatory Care Manager    REASON FOR VISIT: Sick visit  HISTORY OF PRESENT ILLNESS:      Chief Complaint   Patient presents with    Cellulitis    Cough        Patient came today with cellulitis of right leg  Is been having recurrent cellulitis in the same leg  Patient continues to scratch leading to multiple open wounds  Also having worsening swelling in the right leg  No fever and chills    Also complaining of multiple falls  Was a nursing home, was discharged from nursing home last month  Stated physical therapy she is getting at home not enough  Wants referral to physical therapy  Denies any tingling and numbness currently  States that her feet just feel heavy  Denies any dizziness lightheadedness    Patient complaining of being on multiple medications  Wants to cut down the medications or decrease the dose  Her medication list has been updated, patient not taking trazodone anymore  On BuSpar and Celexa  As per  patient was added on BuSpar when her depressive symptoms were not getting controlled  Will not change any of the antidepressant dosing currently  Will reduce the dose of gabapentin    Complaining of chronic cough for a long time  Nonproductive  Denies any chest pain shortness of breath  Denies any chest pain shortness of breath  Denies any symptoms of GERD  Denies any wheezing  Chest x-ray was done couple of months ago showed infiltrate  No fever or chills currently  No weight loss         Patient Active Problem List Diagnosis    Other specified disorders of rotator cuff syndrome of shoulder and allied disorders    Diverticulosis of large intestine    Intestinal or peritoneal adhesions with obstruction (postoperative) (postinfection) (HCC)    Restless legs syndrome (RLS)    GERD (gastroesophageal reflux disease)    Essential hypertension    Mixed hyperlipidemia    Other abnormal glucose    Atherosclerosis    Allergic rhinitis    Vitamin D deficiency    Depression    Degenerative joint disease (DJD) of hip    Peripheral edema    Injury of foot, left    Facial droop    CVA (cerebral vascular accident) ([facility] [address]    Bradycardia    Ataxia    Aphasia    TIA (transient ischemic attack)    Need for prophylactic vaccination and inoculation against cholera alone    Osteopenia    Lumbago    Meralgia paresthetica of right side    Lumbar degenerative disc disease    Spondylosis of lumbar region without myelopathy or radiculopathy    Blood poisoning    Cellulitis of left lower extremity    Encounter for medication monitoring    Deep vein thrombosis (DVT) of right lower extremity (HCC)    Chronic deep vein thrombosis (DVT) of proximal vein of both lower extremities ([facility])    Chronic diastolic heart failure ([facility])    Neurogenic claudication due to lumbar spinal stenosis    Sacroiliitis ([facility])    Stage 3 chronic kidney disease ([facility] [address]    Type 2 diabetes mellitus with circulatory disorder, without long-term current use of insulin (HCC)    Chronic deep vein thrombosis (DVT) of popliteal vein of right lower extremity (HCC)    Closed fracture of left wrist    B12 deficiency    Iron deficiency anemia secondary to inadequate dietary iron intake    Closed head injury    Scalp laceration    Abnormal finding on imaging    Other irritable bowel syndrome    Frequent falls    Double vision    Muscle soreness    Peptic ulcer    Melena    PUD (peptic ulcer disease)    Absolute anemia    Acute blood loss anemia    Acute renal failure (HCC)    Clostridium difficile infection    Acquired spondylolisthesis    Spinal stenosis of lumbar region with neurogenic claudication    Acute deep vein thrombosis (DVT) of proximal vein of left lower extremity (HCC)    Leg swelling    Back pain    Neurological disorder    Closed unstable burst fracture of fifth lumbar vertebra with routine healing    Slow transit constipation    Major depressive disorder, recurrent, moderate (HCC)    Acute deep vein thrombosis (DVT) of femoral vein of left lower extremity (HCC)    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)    Acute deep vein thrombosis (DVT) of right femoral vein ([facility]    S/P cervical spinal fusion    Gait instability    Cervical myelopathy (HCC)    Cellulitis of both lower extremities    Fracture of body of sternum, initial encounter for open fracture    Nondisplaced fracture of sternal end of left clavicle, initial encounter for closed fracture    Erysipelas of right lower extremity    Closed fracture of body of sternum    Calculus of gallbladder without cholecystitis without obstruction    Leukocytosis    Type 2 diabetes mellitus with stage 3 chronic kidney disease (HCC)    Allergy to sulfa drugs    Bilateral cellulitis of lower leg    Lymphedema of both lower extremities    Cerebral infarction, unspecified ([address]    Chronic embolism and thrombosis of unspecified deep veins of proximal lower extremity, bilateral ([address]    Other specified disorders of bone density and structure, unspecified site    Repeated falls    Fall as cause of accidental injury in home as place of occurrence, initial encounter       Health Maintenance Due   Topic Date Due    DTaP/Tdap/Td vaccine (1 - Tdap) Never done    Shingles vaccine (1 of 2) Never done    Diabetic microalbuminuria test  08/05/2016    Diabetic retinal exam  07/20/2021    COVID-19 Vaccine (3 - Booster for Pfizer series) 09/08/2021    Lipids  03/12/2022    Annual Wellness Visit (AWV)  05/10/2022    Flu vaccine (1) 08/01/2022       Allergies Allergen Reactions    Bactrim [Sulfamethoxazole-Trimethoprim] Other (See Comments)     confusion    Codeine Itching    Diazepam Other (See Comments)    Meperidine Hcl Other (See Comments)    Seasonal          MEDICATIONS:     Current Outpatient Medications   Medication Sig Dispense Refill    pantoprazole (PROTONIX) 40 MG tablet Take 1 tablet by mouth every morning (before breakfast) 30 tablet 3    atorvastatin (LIPITOR) 20 MG tablet Take 1 tablet by mouth nightly 30 tablet 3    citalopram (CELEXA) 20 MG tablet Take 1 tablet by mouth daily 30 tablet 0    pramipexole (MIRAPEX) 0.5 MG tablet Take 1 tablet by mouth nightly 30 tablet 0    ferrous sulfate (IRON 325) 325 (65 Fe) MG tablet Take 325 mg by mouth daily (with breakfast)      vitamin D (CHOLECALCIFEROL) 25 MCG (1000 UT) TABS tablet Take 1,000 Units by mouth at bedtime      apixaban (ELIQUIS) 5 MG TABS tablet Take 1 tablet by mouth 2 times daily 60 tablet 0    busPIRone (BUSPAR) 5 MG tablet Take 1 tablet by mouth 3 times daily 90 tablet 0    furosemide (LASIX) 20 MG tablet Take 1 tablet by mouth daily 30 tablet 0    fenofibrate micronized (LOFIBRA) 134 MG capsule Take 1 capsule by mouth every morning (before breakfast) 90 capsule 2    carvedilol (COREG) 12.5 MG tablet Take 1 tablet by mouth 2 times daily 60 tablet 0    melatonin 3 MG TABS tablet Take 2 tablets by mouth nightly as needed (insomnia)      nitroGLYCERIN (NITROSTAT) 0.4 MG SL tablet Place 1 tablet under the tongue every 5 minutes as needed for Chest pain 75 tablet 3    cyanocobalamin ([employer] VITAMIN B12) 1000 MCG tablet Take 1 tablet by mouth daily 30 tablet 3    Calcium Carbonate-Vitamin D 500-125 MG-UNIT TABS Take 1 tablet by mouth nightly       traZODone (DESYREL) 50 MG tablet TAKE 1 TABLET BY MOUTH EVERY NIGHT AS NEEDED FOR SLEEP (Patient not taking: No sig reported) 30 tablet 0    gabapentin (NEURONTIN) 100 MG capsule Take 2 capsules by mouth 2 times daily for 30 days.  120 capsule 0    Skin Protectants, Misc. (HYDROCERIN) CREA cream Apply topically 2 times daily (Patient not taking: No sig reported) 1 each 0    Lancets MISC 1 each by Does not apply route daily (Patient not taking: No sig reported) 100 each 3    blood glucose monitor strips Test 2 times a day & as needed for symptoms of irregular blood glucose. (Patient not taking: No sig reported) 100 strip 5     No current facility-administered medications for this visit. SOCIAL HISTORY    Reviewed and no change from previous record. Selvin Wilson  reports that she quit smoking about 5 years ago. Her smoking use included cigarettes. She started smoking about 27 years ago. She has a 10.00 pack-year smoking history. She has never used smokeless tobacco.    FAMILY HISTORY:    Reviewed and No change from previous visit  family history includes Diabetes in her mother; Heart Disease in her brother, father, maternal grandmother, and mother; High Blood Pressure in her brother, mother, and sister; Stroke in her mother.     OF SYSTEMS:    General : Negative for fatigue, weight loss, appetite change  HEENT : Negative for nasal congestion, sneezing, runny nose, sinus pain  Respiratory : Positive for cough  Negative for shortness of breath and wheezing   cardiovascular: Negative for chest pain, palpitations, shortness of breath  GI: Negative for abdominal pain, nausea, vomiting, diarrhea, constipation  Genitourinary : negative for dysuria, urinary frequency, urinary urgency, hematuria  Neurological : Negative for headache, dizziness, gait change,   Psych : Negative for depression, anxiety  Skin: Redness  Open wounds on right leg  musculoskeletal : Positive for fall      PHYSICAL EXAM:      Vitals:    10/10/22 1446   BP: 138/82   Pulse: 76   SpO2: 97%   Weight: 179 lb (81.2 kg)   Height: 5' 3\" (1.6 m)     BP Readings from Last 3 Encounters:   10/10/22 138/82   09/14/22 (!) 142/98   09/10/22 128/72        Physical Exam  Constitutional:       Comments: Frail, on wheelchair   HENT:      Head: Normocephalic. Mouth/Throat:      Mouth: Mucous membranes are moist.   Eyes:      Extraocular Movements: Extraocular movements intact. Pupils: Pupils are equal, round, and reactive to light. Cardiovascular:      Rate and Rhythm: Normal rate and regular rhythm. Pulmonary:      Effort: Pulmonary effort is normal. No respiratory distress. Breath sounds: Normal breath sounds. No wheezing or rales. Abdominal:      Palpations: Abdomen is soft. Musculoskeletal:      Cervical back: Normal range of motion. Right lower leg: Edema present. Comments: Right lower extremity edema  With erythema   small open wounds present on the right leg  Purulent discharge appreciated  Right leg warm to touch   Skin:     General: Skin is warm. Findings: Erythema and rash present. Neurological:      Mental Status: She is alert. Lab Results   Component Value Date    LABA1C 5.1 02/01/2022     Lab Results   Component Value Date     10/02/2019      LABORATORY FINDINGS:    CBC:  Lab Results   Component Value Date/Time    WBC 5.9 09/09/2022 06:26 AM    HGB 11.9 09/09/2022 06:26 AM     09/09/2022 06:26 AM       BMP:    Lab Results   Component Value Date/Time     09/09/2022 06:26 AM    K 4.3 09/09/2022 06:26 AM     09/09/2022 06:26 AM    CO2 29 09/09/2022 06:26 AM    BUN 25 09/09/2022 06:26 AM    CREATININE 0.87 09/09/2022 06:26 AM    GLUCOSE 111 09/09/2022 06:26 AM       HEMOGLOBIN A1C:   Lab Results   Component Value Date/Time    LABA1C 5.1 02/01/2022 10:20 AM       FASTING LIPID PANEL:  Lab Results   Component Value Date    CHOL 103 05/20/2019    HDL 74 03/12/2021    TRIG 66 05/20/2019       ASSESSMENT AND PLAN:      1. Fall, initial encounter    - External Referral To Physical Therapy  - Vitamin D 25 Hydroxy; Future  Increase the goal dose of gabapentin  Patient was advised to call office if her neuropathy symptoms get worse    2.  Vitamin D deficiency    - Vitamin D 25 Hydroxy; Future    3. Chronic cough    - XR CHEST (2 VW); Future is been going on for the last couple of months  No other symptoms      4. Cellulitis of right lower extremity  Recurrent in the same leg  Will treat with amoxicillin and doxycycline  Allergic to Bactrim    - amoxicillin (AMOXIL) 500 MG capsule; Take 1 capsule by mouth 3 times daily for 10 days  Dispense: 30 capsule; Refill: 0  - doxycycline hyclate (VIBRA-TABS) 100 MG tablet; Take 1 tablet by mouth 2 times daily for 10 days  Dispense: 20 tablet; Refill: 0     FOLLOW UP AND INSTRUCTIONS:   No follow-ups on file. Krystian Mcdonough received counseling on the following healthy behaviors: exercise    Discussed use, benefit, and side effects of prescribed medications. Barriers to medication compliance addressed. All patient questions answered. Pt voiced understanding. Patient given educational materials - see patient instructions    MD GENA Jo [facility]  10/10/2022, 2:50 PM    Please note that this chart was generated using voice recognition Dragon dictation software. Although every effort was made to ensure the accuracy of this automatedtranscription, some errors in transcription may have occurred.

## 2022-10-11 ENCOUNTER — TELEPHONE (OUTPATIENT)
Dept: INTERNAL MEDICINE CLINIC | Age: 72
End: 2022-10-11

## 2022-10-11 NOTE — TELEPHONE ENCOUNTER
Alyssa a home health nurse from [facility] home health called and stated that pt was seen yesterday 10/10 with provider Annie Ash. Since appt pt has fallen twice. Pt left hand is swollen and discolored, alyssa stated that pt can make a fist and able to move fingers.  Please Advise Thank you

## 2022-10-11 NOTE — TELEPHONE ENCOUNTER
Called patient, no answer, left a detailed voice message on machine for the patient to call the office back and with the instructions listed below by Dr. Liz Gallardo

## 2022-10-11 NOTE — TELEPHONE ENCOUNTER
Patient need to see a physician urgently, she need to either go to ER, or schedule appointment with our office tomorrow.

## 2022-10-14 ENCOUNTER — CARE COORDINATION (OUTPATIENT)
Dept: CARE COORDINATION | Age: 72
End: 2022-10-14

## 2022-10-14 NOTE — CARE COORDINATION
Ambulatory Care Coordination Note  10/14/2022    ACC: Herve Ge, RN    Summary  Date Care Coordination Episode Started:  10.10.22  Week: 2      Reason patient is in Care Coordination:     CTN referral     Topics Discussed Today:     Fall, injury to hand. Patient reports she can use her hand and make a fist. She does not believe there are any fractures. Does have an appointment with a specialist scheduled. Medications, no needs today per patient. DM, last a1c was 5.1, no concerns. CHF, does not weigh self, no symptoms reported. Chronic cough, patient stated she will discuss with PCP at upcoming appointment. Patient denied any needs from [employer] or PCP today. Assessments completed today:     Fall risk  Initial assessment  Medication reconciliation  SDOH  DM and CHF(Health assessments)      Care Coordinator plan of care: Follow up call in 1 week, check needs and continue education. Offered patient enrollment in the Remote Patient Monitoring (RPM) program for in-home monitoring: Patient is not eligible for RPM program, insurance. Care Coordination Interventions    Referral from Primary Care Provider: No  Suggested Interventions and Community Resources  Fall Risk Prevention: In Process  Home Health Services: Completed (Comment: jeimy)  Zone Management Tools: In Process          Goals Addressed                   This Visit's Progress     Reduce Falls    Improving     I will reduce my risk of falls by the following: Use walking aids like cane or walker    Barriers: overwhelmed by complexity of regimen  Plan for overcoming my barriers: work with Nathan Mancini  Confidence: 9/10  Anticipated Goal Completion Date: 11.22.22              Prior to Admission medications    Medication Sig Start Date End Date Taking?  Authorizing Provider   amoxicillin (AMOXIL) 500 MG capsule Take 1 capsule by mouth 3 times daily for 10 days 10/10/22 10/20/22  Milford Lesch, MD   doxycycline hyclate (VIBRA-TABS) 100 MG tablet Take 1 tablet by mouth 2 times daily for 10 days 10/10/22 10/20/22  Hayden Knox MD   gabapentin (NEURONTIN) 100 MG capsule Take 1 capsule by mouth 2 times daily for 30 days. 10/10/22 11/9/22  Hayden Knox MD   pantoprazole (PROTONIX) 40 MG tablet Take 1 tablet by mouth every morning (before breakfast) 10/3/22   Walt Sandoval MD   atorvastatin (LIPITOR) 20 MG tablet Take 1 tablet by mouth nightly 10/3/22   Walt Sandoval MD   citalopram (CELEXA) 20 MG tablet Take 1 tablet by mouth daily 10/3/22   Walt Sandoval MD   pramipexole (MIRAPEX) 0.5 MG tablet Take 1 tablet by mouth nightly 10/3/22   Walt Sandoval MD   ferrous sulfate (IRON 325) 325 (65 Fe) MG tablet Take 325 mg by mouth daily (with breakfast)    Historical Provider, MD   vitamin D (CHOLECALCIFEROL) 25 MCG (1000 UT) TABS tablet Take 1,000 Units by mouth at bedtime    Historical Provider, MD   apixaban (ELIQUIS) 5 MG TABS tablet Take 1 tablet by mouth 2 times daily 8/17/22   Marquis Gigi MD   Skin Protectants, Misc.  (HYDROCERIN) CREA cream Apply topically 2 times daily  Patient not taking: No sig reported 7/18/22   Rosie Tineo MD   busPIRone (BUSPAR) 5 MG tablet Take 1 tablet by mouth 3 times daily 7/5/22   Dell Alexandra MD   furosemide (LASIX) 20 MG tablet Take 1 tablet by mouth daily 6/29/22   Dell Alexandra MD   fenofibrate micronized (LOFIBRA) 134 MG capsule Take 1 capsule by mouth every morning (before breakfast) 5/23/22   Dell Alexandra MD   carvedilol (COREG) 12.5 MG tablet Take 1 tablet by mouth 2 times daily 4/10/22   Kike Villanueva MD   albuterol-ipratropium (COMBIVENT RESPIMAT)  MCG/ACT AERS inhaler Inhale 1 puff into the lungs every 6 hours as needed for Wheezing or Shortness of Breath  Patient not taking: Reported on 9/6/2022 4/10/22 9/9/22  Kike Villanueva MD   amLODIPine (NORVASC) 5 MG tablet Take 1 tablet by mouth daily 4/10/22 7/21/22  Kike Villanueva MD   melatonin 3 MG TABS tablet Take 2 tablets by mouth nightly as needed (insomnia) 4/7/22   Monique Garcia MD   acetaminophen (TYLENOL) 325 MG tablet Take 2 tablets by mouth every 4 hours as needed for Pain 4/7/22 9/9/22  Monique Garcia MD   nitroGLYCERIN (NITROSTAT) 0.4 MG SL tablet Place 1 tablet under the tongue every 5 minutes as needed for Chest pain 9/1/21   Giovanny Clay MD   cyanocobalamin ([employer] VITAMIN B12) 1000 MCG tablet Take 1 tablet by mouth daily 12/3/20   Giovanny Clay MD   Lancets MISC 1 each by Does not apply route daily  Patient not taking: No sig reported 10/10/19   Aminata Wade MD   blood glucose monitor strips Test 2 times a day & as needed for symptoms of irregular blood glucose.   Patient not taking: No sig reported 10/10/19   Aminata Wade MD   Calcium Carbonate-Vitamin D 500-125 MG-UNIT TABS Take 1 tablet by mouth nightly     Historical Provider, MD       Future Appointments   Date Time Provider Britni Marshall   10/17/2022  1:15 PM JARRELL Case MD [facility] None   10/20/2022  1:00 PM Giovanny Clay MD [address]   12/7/2022  1:00 PM Marjorie Hinton MD [facility]   ,   Diabetes Assessment      Meal Planning: Other   How often do you test your blood sugar?: Other       No patient-reported symptoms        ,   Congestive Heart Failure Assessment    Are you currently restricting fluids?: Other  Do you understand a low sodium diet?: Yes  Do you understand how to read food labels?: Yes  Do you salt your food before tasting it?: No         Symptoms:     Symptom course: stable  Weight trend:  (Comment: does not weigh self)  Salt intake watch compared to last visit: stable     , and   General Assessment    Do you have any symptoms that are causing concern?: Yes  Progression since Onset: Intermittent - Waxing/Waning, Unchanged  Reported Symptoms: Cough

## 2022-10-19 NOTE — PROGRESS NOTES
Subjective:      Patient ID: Sam Rivers is a 71 y.o. female. HPI-patient past medical history multiple medical problem which include obesity, history of stroke, heart failure with preserved ejection fraction, chronic DVT on anticoagulation, hypertension  Patient was recently admitted in the hospital with cellulitis,  Patient and her  at bedside feel that patient is very weak, she has increased swelling of the legs  It is very difficult for patient has been to take care of her  They are requesting if patient can be placed in a nursing home      Review of Systems   Constitutional:  Positive for unexpected weight change (weight gain). Negative for activity change, appetite change, chills, diaphoresis, fatigue and fever. HENT:  Negative for congestion, dental problem, drooling and ear discharge. Eyes:  Negative for pain, discharge, redness and itching. Respiratory:  Negative for apnea, cough, choking, chest tightness and shortness of breath. Cardiovascular:  Positive for leg swelling. Negative for chest pain. Gastrointestinal:  Negative for abdominal distention, abdominal pain, blood in stool, constipation and diarrhea. Endocrine: Negative for cold intolerance and heat intolerance. Genitourinary:  Negative for difficulty urinating, dysuria, enuresis, flank pain and frequency. Musculoskeletal:  Negative for arthralgias, back pain, gait problem and joint swelling. Skin:  Negative for color change, pallor and rash. Neurological:  Negative for dizziness, facial asymmetry, light-headedness, numbness and headaches. Psychiatric/Behavioral:  Negative for agitation, behavioral problems, confusion, decreased concentration and dysphoric mood. Objective:   Physical Exam  Constitutional:       Appearance: She is well-developed. She is not diaphoretic. Comments: Wheel chair bound    HENT:      Head: Normocephalic and atraumatic. Mouth/Throat:      Pharynx: No oropharyngeal exudate. Eyes:      General: No scleral icterus. Right eye: No discharge. Left eye: No discharge. Conjunctiva/sclera: Conjunctivae normal.      Pupils: Pupils are equal, round, and reactive to light. Neck:      Thyroid: No thyromegaly. Vascular: No JVD. Trachea: No tracheal deviation. Cardiovascular:      Rate and Rhythm: Normal rate. Heart sounds: Normal heart sounds. No murmur heard. No gallop. Pulmonary:      Effort: Pulmonary effort is normal. No respiratory distress. Breath sounds: Normal breath sounds. No stridor. No wheezing or rales. Chest:      Chest wall: No tenderness. Abdominal:      General: Bowel sounds are normal. There is no distension. Palpations: Abdomen is soft. Tenderness: There is no abdominal tenderness. There is no guarding or rebound. Musculoskeletal:         General: Swelling (2 plus Pitting pedal edema in bothlegs) present. Normal range of motion. Cervical back: Normal range of motion and neck supple. Neurological:      Mental Status: She is alert and oriented to person, place, and time. Assessment / Plan:   1. Screening for hyperlipidemia  - Lipid Panel; Future    2. Essential hypertension  Controlled   - Lipid Panel; Future  - carvedilol (COREG) 12.5 MG tablet; Take 1 tablet by mouth 2 times daily  Dispense: 60 tablet; Refill: 0    3. Depression, unspecified depression type  - citalopram (CELEXA) 20 MG tablet; Take 1 tablet by mouth daily  Dispense: 30 tablet; Refill: 0    4. Type 2 diabetes mellitus with other circulatory complication, without long-term current use of insulin (HCC)  Controlled   - Microalbumin, Ur; Future  - atorvastatin (LIPITOR) 20 MG tablet; Take 1 tablet by mouth nightly  Dispense: 30 tablet; Refill: 3    5. Deep vein thrombosis (DVT) of right lower extremity, unspecified chronicity, unspecified vein (HCC)  - apixaban (ELIQUIS) 5 MG TABS tablet;  Take 1 tablet by mouth 2 times daily  Dispense: 60 tablet; Refill: 0    6. Localized swelling of both lower legs  Increasing Lasix to 40 from 20 mg  - furosemide (LASIX) 40 MG tablet; Take 1 tablet by mouth daily  Dispense: 90 tablet; Refill: 1    7. Chronic diastolic heart failure (HCC)  - furosemide (LASIX) 40 MG tablet; Take 1 tablet by mouth daily  Dispense: 90 tablet; Refill: 1    8. Chronic sinusitis, unspecified location  - fluticasone (FLONASE) 50 MCG/ACT nasal spray; 1 spray by Each Nostril route daily  Dispense: 32 g; Refill: 1    9. Itching  - hydrOXYzine HCl (ATARAX) 25 MG tablet; Take 1 tablet by mouth every 8 hours as needed for Itching  Dispense: 30 tablet; Refill: 0      Requested , to call [facility], to help with patient placement in nursing home  Increasing diuretics  Will see patient closely    Return in about 4 weeks (around 11/17/2022). Reviewed prior labs and health maintenance. Discussed use, benefit, and side effects of prescribed medications. Barriers to medication compliance addressed. All patient questions answered. Pt voiced understanding. Scot Section, MD GE[facility]  10/27/2022, 3:29 PM    Please note that this chart was generated using voice recognition Dragon dictation software. Although every effort was made to ensure the accuracy of this automated transcription, some errors in transcription may have occurred. Visit Information    Have you changed or started any medications since your last visit including any over-the-counter medicines, vitamins, or herbal medicines? no   Are you having any side effects from any of your medications? -  no  Have you stopped taking any of your medications? Is so, why? -  no    Have you seen any other physician or provider since your last visit? Yes - Records Obtained  Have you had any other diagnostic tests since your last visit? No  Have you been seen in the emergency room and/or had an admission to a hospital since we last saw you?  No  Have you had your routine dental cleaning in the past 6 months? no    Have you activated your MyChart account? If not, what are your barriers?  Yes     Patient Care Team:  Promise Song MD as PCP - General (Internal Medicine)  Promise Song MD as PCP - [facility] Empaneled Provider  Yancey Osler, MD as Consulting Physician (Gastroenterology)  Trudy Fournier RN as [address] [place] [address] History Review  Past Medical, Family, and Social History reviewed and does contribute to the patient presenting condition    Health Maintenance   Topic Date Due    DTaP/Tdap/Td vaccine (1 - Tdap) Never done    Shingles vaccine (1 of 2) Never done    Diabetic microalbuminuria test  08/05/2016    Diabetic retinal exam  07/20/2021    COVID-19 Vaccine (3 - Booster for Pfizer series) 09/08/2021    Lipids  03/12/2022    Flu vaccine (1) 08/01/2022    A1C test (Diabetic or Prediabetic)  02/01/2023    Diabetic foot exam  03/12/2023    Depression Monitoring  04/20/2023    Breast cancer screen  05/06/2023    Colorectal Cancer Screen  10/07/2026    DEXA (modify frequency per FRAX score)  Completed    Pneumococcal 65+ years Vaccine  Completed    Hepatitis C screen  Completed    Hepatitis A vaccine  Aged Out    Hib vaccine  Aged Out    Meningococcal (ACWY) vaccine  Aged Out

## 2022-10-20 ENCOUNTER — OFFICE VISIT (OUTPATIENT)
Dept: INTERNAL MEDICINE CLINIC | Age: 72
End: 2022-10-20
Payer: MEDICARE

## 2022-10-20 VITALS
HEIGHT: 63 IN | WEIGHT: 163 LBS | OXYGEN SATURATION: 96 % | HEART RATE: 68 BPM | BODY MASS INDEX: 28.88 KG/M2 | DIASTOLIC BLOOD PRESSURE: 72 MMHG | SYSTOLIC BLOOD PRESSURE: 136 MMHG

## 2022-10-20 DIAGNOSIS — R22.43 LOCALIZED SWELLING OF BOTH LOWER LEGS: ICD-10-CM

## 2022-10-20 DIAGNOSIS — L29.9 ITCHING: ICD-10-CM

## 2022-10-20 DIAGNOSIS — I10 ESSENTIAL HYPERTENSION: ICD-10-CM

## 2022-10-20 DIAGNOSIS — Z13.220 SCREENING FOR HYPERLIPIDEMIA: Primary | ICD-10-CM

## 2022-10-20 DIAGNOSIS — E11.59 TYPE 2 DIABETES MELLITUS WITH OTHER CIRCULATORY COMPLICATION, WITHOUT LONG-TERM CURRENT USE OF INSULIN (HCC): ICD-10-CM

## 2022-10-20 DIAGNOSIS — I82.401 DEEP VEIN THROMBOSIS (DVT) OF RIGHT LOWER EXTREMITY, UNSPECIFIED CHRONICITY, UNSPECIFIED VEIN (HCC): ICD-10-CM

## 2022-10-20 DIAGNOSIS — J32.9 CHRONIC SINUSITIS, UNSPECIFIED LOCATION: ICD-10-CM

## 2022-10-20 DIAGNOSIS — F32.A DEPRESSION, UNSPECIFIED DEPRESSION TYPE: ICD-10-CM

## 2022-10-20 DIAGNOSIS — I50.32 CHRONIC DIASTOLIC HEART FAILURE (HCC): ICD-10-CM

## 2022-10-20 PROCEDURE — 2022F DILAT RTA XM EVC RTNOPTHY: CPT | Performed by: INTERNAL MEDICINE

## 2022-10-20 PROCEDURE — 3074F SYST BP LT 130 MM HG: CPT | Performed by: INTERNAL MEDICINE

## 2022-10-20 PROCEDURE — 1090F PRES/ABSN URINE INCON ASSESS: CPT | Performed by: INTERNAL MEDICINE

## 2022-10-20 PROCEDURE — G8417 CALC BMI ABV UP PARAM F/U: HCPCS | Performed by: INTERNAL MEDICINE

## 2022-10-20 PROCEDURE — G8427 DOCREV CUR MEDS BY ELIG CLIN: HCPCS | Performed by: INTERNAL MEDICINE

## 2022-10-20 PROCEDURE — 99214 OFFICE O/P EST MOD 30 MIN: CPT | Performed by: INTERNAL MEDICINE

## 2022-10-20 PROCEDURE — 3017F COLORECTAL CA SCREEN DOC REV: CPT | Performed by: INTERNAL MEDICINE

## 2022-10-20 PROCEDURE — G8399 PT W/DXA RESULTS DOCUMENT: HCPCS | Performed by: INTERNAL MEDICINE

## 2022-10-20 PROCEDURE — G8484 FLU IMMUNIZE NO ADMIN: HCPCS | Performed by: INTERNAL MEDICINE

## 2022-10-20 PROCEDURE — 3078F DIAST BP <80 MM HG: CPT | Performed by: INTERNAL MEDICINE

## 2022-10-20 PROCEDURE — 1036F TOBACCO NON-USER: CPT | Performed by: INTERNAL MEDICINE

## 2022-10-20 PROCEDURE — 1123F ACP DISCUSS/DSCN MKR DOCD: CPT | Performed by: INTERNAL MEDICINE

## 2022-10-20 PROCEDURE — 3044F HG A1C LEVEL LT 7.0%: CPT | Performed by: INTERNAL MEDICINE

## 2022-10-20 RX ORDER — FUROSEMIDE 40 MG/1
40 TABLET ORAL DAILY
Qty: 90 TABLET | Refills: 1 | Status: SHIPPED | OUTPATIENT
Start: 2022-10-20

## 2022-10-20 RX ORDER — FLUTICASONE PROPIONATE 50 MCG
1 SPRAY, SUSPENSION (ML) NASAL DAILY
Qty: 32 G | Refills: 1 | Status: SHIPPED | OUTPATIENT
Start: 2022-10-20

## 2022-10-20 RX ORDER — CARVEDILOL 12.5 MG/1
12.5 TABLET ORAL 2 TIMES DAILY
Qty: 60 TABLET | Refills: 0 | Status: SHIPPED | OUTPATIENT
Start: 2022-10-20

## 2022-10-20 RX ORDER — CITALOPRAM 20 MG/1
20 TABLET ORAL DAILY
Qty: 30 TABLET | Refills: 0 | Status: SHIPPED | OUTPATIENT
Start: 2022-10-20

## 2022-10-20 RX ORDER — ATORVASTATIN CALCIUM 20 MG/1
20 TABLET, FILM COATED ORAL NIGHTLY
Qty: 30 TABLET | Refills: 3 | Status: SHIPPED | OUTPATIENT
Start: 2022-10-20

## 2022-10-20 RX ORDER — HYDROXYZINE HYDROCHLORIDE 25 MG/1
25 TABLET, FILM COATED ORAL EVERY 8 HOURS PRN
Qty: 30 TABLET | Refills: 0 | Status: SHIPPED | OUTPATIENT
Start: 2022-10-20 | End: 2022-10-30

## 2022-10-21 ENCOUNTER — CARE COORDINATION (OUTPATIENT)
Dept: CARE COORDINATION | Age: 72
End: 2022-10-21

## 2022-10-21 NOTE — CARE COORDINATION
Attempting to reach patient for a follow up care coordination call. Left detailed message for the patient to return my call with any questions or concerns. PCP would like to refer patient to [facility], will discuss with patient when I reach her.

## 2022-10-23 ENCOUNTER — CARE COORDINATION (OUTPATIENT)
Dept: CARE COORDINATION | Age: 72
End: 2022-10-23

## 2022-10-25 ENCOUNTER — CARE COORDINATION (OUTPATIENT)
Dept: CARE COORDINATION | Age: 72
End: 2022-10-25

## 2022-10-25 DIAGNOSIS — S22.22XA CLOSED FRACTURE OF BODY OF STERNUM, INITIAL ENCOUNTER: ICD-10-CM

## 2022-10-25 DIAGNOSIS — L03.115 CELLULITIS OF BOTH LOWER EXTREMITIES: ICD-10-CM
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
## 2022-10-25 DIAGNOSIS — Z74.2 NEED FOR HOME HEALTH CARE: Primary | ICD-10-CM

## 2022-10-25 DIAGNOSIS — I89.0 LYMPHEDEMA OF BOTH LOWER EXTREMITIES: ICD-10-CM
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
## 2022-10-25 DIAGNOSIS — R29.6 REPEATED FALLS: ICD-10-CM

## 2022-10-25 DIAGNOSIS — L03.116 CELLULITIS OF BOTH LOWER EXTREMITIES: ICD-10-CM

## 2022-10-25 NOTE — CARE COORDINATION
Patient returned call to [facility] [place] asking to try to get her admitted to [facility]. [facility] spoke with Aggie Blackburn at [facility] who asked to have the face sheet and referral faxed to her at [phone]. [facility] will do this now and wait for her return call.

## 2022-10-26 ENCOUNTER — CARE COORDINATION (OUTPATIENT)
Dept: CARE COORDINATION | Age: 72
End: 2022-10-26

## 2022-10-26 NOTE — CARE COORDINATION
Will finish notes likely today  Patient, can increase her Lasix to 80 mg for few days  Increase Lasix dose will take few days to show full effect  Need to watch her salt closely

## 2022-10-26 NOTE — CARE COORDINATION
[facility] went out to evaluate [facility] for Placement. Howard Leavitt, called asking if PCP could update the office note from 10.20.22 so that she can hopefully get her accepted. She stated that you increased her furosemide from 20 mg to 40 mg and she still has 3 + pitting edema bilaterally. Nurse Howard Leavitt reports that her legs are also tight and shiny, she has a cough and is SOB with exertion. She may be able to get admission approved if it can be to work on medication adjustments such as CHF meds-diuretics.    Please advise

## 2022-10-26 NOTE — TELEPHONE ENCOUNTER
Spoke with patient and discussed in detail. She did agree- I requested patient to call either [facility] or I in a few days to follow up. Patient verbalized understanding to take 80mg of Lasix.

## 2022-10-26 NOTE — CARE COORDINATION
Kofi Agustin RN at [facility] rehab contacted [facility] stating that she is going to patients house today at 12:30. She is unsure if patient will qualify through her insurance but will touch base with ACM after visit with any concerns. [facility] explained that patient mentioned she thought her cellulitis has returned. Kofi Agustin will check on this while she is there.

## 2022-10-27 ENCOUNTER — CARE COORDINATION (OUTPATIENT)
Dept: CARE COORDINATION | Age: 72
End: 2022-10-27

## 2022-10-27 ASSESSMENT — ENCOUNTER SYMPTOMS
EYE ITCHING: 0
ABDOMINAL DISTENTION: 0
CONSTIPATION: 0
COLOR CHANGE: 0
CHEST TIGHTNESS: 0
CHOKING: 0
COUGH: 0
EYE REDNESS: 0
EYE DISCHARGE: 0
ABDOMINAL PAIN: 0
APNEA: 0
BLOOD IN STOOL: 0
EYE PAIN: 0
BACK PAIN: 0
DIARRHEA: 0
SHORTNESS OF BREATH: 0

## 2022-10-27 NOTE — CARE COORDINATION
Phillip called to confirm that Ajay Post will be admitted to [facility] today. She has been accepted. Average LOS is 10 days, ACM will reach out then to check on patient.    PCP updated

## 2022-10-27 NOTE — CARE COORDINATION
JAMES spoke Kofi Agustin at [facility]. She stated with the med adjustment ordered by PCP she believes that they will be able to accept this patient to inpatient rehab. She will submit request today and update JAMES.

## 2022-10-28 ENCOUNTER — HOSPITAL ENCOUNTER (OUTPATIENT)
Age: 72
Setting detail: SPECIMEN
Discharge: HOME | End: 2022-10-28

## 2022-10-28 LAB
ABSOLUTE EOS #: 0.39 K/UL (ref 0–0.44)
ABSOLUTE IMMATURE GRANULOCYTE: 0.03 K/UL (ref 0–0.3)
ABSOLUTE LYMPH #: 1.87 K/UL (ref 1.1–3.7)
ABSOLUTE MONO #: 0.42 K/UL (ref 0.1–1.2)
ALBUMIN SERPL-MCNC: 4 G/DL (ref 3.5–5.2)
ALP BLD-CCNC: 51 U/L (ref 35–104)
ALT SERPL-CCNC: 14 U/L (ref 5–33)
ANION GAP SERPL CALCULATED.3IONS-SCNC: 13 MMOL/L (ref 9–17)
AST SERPL-CCNC: 22 U/L
BASOPHILS # BLD: 1 % (ref 0–2)
BASOPHILS ABSOLUTE: 0.04 K/UL (ref 0–0.2)
BILIRUB SERPL-MCNC: 0.4 MG/DL (ref 0.3–1.2)
BUN BLDV-MCNC: 28 MG/DL (ref 8–23)
BUN/CREAT BLD: 29 (ref 9–20)
CALCIUM SERPL-MCNC: 9.1 MG/DL (ref 8.6–10.4)
CHLORIDE BLD-SCNC: 105 MMOL/L (ref 98–107)
CO2: 24 MMOL/L (ref 20–31)
CREAT SERPL-MCNC: 0.96 MG/DL (ref 0.5–0.9)
EOSINOPHILS RELATIVE PERCENT: 7 % (ref 1–4)
GFR SERPL CREATININE-BSD FRML MDRD: >60 ML/MIN/1.73M2
GLUCOSE BLD-MCNC: 135 MG/DL (ref 70–99)
HCT VFR BLD CALC: 41.5 % (ref 36.3–47.1)
HEMOGLOBIN: 12.5 G/DL (ref 11.9–15.1)
IMMATURE GRANULOCYTES: 1 %
LYMPHOCYTES # BLD: 32 % (ref 24–43)
MCH RBC QN AUTO: 29.9 PG (ref 25.2–33.5)
MCHC RBC AUTO-ENTMCNC: 30.1 G/DL (ref 28.4–34.8)
MCV RBC AUTO: 99.3 FL (ref 82.6–102.9)
MONOCYTES # BLD: 7 % (ref 3–12)
NRBC AUTOMATED: 0 PER 100 WBC
PDW BLD-RTO: 14.2 % (ref 11.8–14.4)
PLATELET # BLD: 279 K/UL (ref 138–453)
PMV BLD AUTO: 9.6 FL (ref 8.1–13.5)
POTASSIUM SERPL-SCNC: 4 MMOL/L (ref 3.7–5.3)
RBC # BLD: 4.18 M/UL (ref 3.95–5.11)
SEG NEUTROPHILS: 52 % (ref 36–65)
SEGMENTED NEUTROPHILS ABSOLUTE COUNT: 3.02 K/UL (ref 1.5–8.1)
SODIUM BLD-SCNC: 142 MMOL/L (ref 135–144)
TOTAL PROTEIN: 7 G/DL (ref 6.4–8.3)
WBC # BLD: 5.8 K/UL (ref 3.5–11.3)

## 2022-10-28 PROCEDURE — 80053 COMPREHEN METABOLIC PANEL: CPT

## 2022-10-28 PROCEDURE — 85025 COMPLETE CBC W/AUTO DIFF WBC: CPT

## 2022-10-28 PROCEDURE — P9603 ONE-WAY ALLOW PRORATED MILES: HCPCS

## 2022-10-28 PROCEDURE — 36415 COLL VENOUS BLD VENIPUNCTURE: CPT

## 2022-11-01 ENCOUNTER — HOSPITAL ENCOUNTER (OUTPATIENT)
Age: 72
Setting detail: SPECIMEN
Discharge: HOME | End: 2022-11-01

## 2022-11-01 LAB
ABSOLUTE EOS #: 0.46 K/UL (ref 0–0.44)
ABSOLUTE IMMATURE GRANULOCYTE: 0.01 K/UL (ref 0–0.3)
ABSOLUTE LYMPH #: 2.07 K/UL (ref 1.1–3.7)
ABSOLUTE MONO #: 0.45 K/UL (ref 0.1–1.2)
ALBUMIN SERPL-MCNC: 3.6 G/DL (ref 3.5–5.2)
ALP BLD-CCNC: 43 U/L (ref 35–104)
ALT SERPL-CCNC: 10 U/L (ref 5–33)
ANION GAP SERPL CALCULATED.3IONS-SCNC: 10 MMOL/L (ref 9–17)
AST SERPL-CCNC: 15 U/L
BASOPHILS # BLD: 1 % (ref 0–2)
BASOPHILS ABSOLUTE: 0.07 K/UL (ref 0–0.2)
BILIRUB SERPL-MCNC: 0.3 MG/DL (ref 0.3–1.2)
BUN BLDV-MCNC: 51 MG/DL (ref 8–23)
BUN/CREAT BLD: 35 (ref 9–20)
CALCIUM SERPL-MCNC: 9.1 MG/DL (ref 8.6–10.4)
CHLORIDE BLD-SCNC: 104 MMOL/L (ref 98–107)
CO2: 26 MMOL/L (ref 20–31)
CREAT SERPL-MCNC: 1.44 MG/DL (ref 0.5–0.9)
EOSINOPHILS RELATIVE PERCENT: 9 % (ref 1–4)
GFR SERPL CREATININE-BSD FRML MDRD: 39 ML/MIN/1.73M2
GLUCOSE BLD-MCNC: 129 MG/DL (ref 70–99)
HCT VFR BLD CALC: 36.9 % (ref 36.3–47.1)
HEMOGLOBIN: 11.6 G/DL (ref 11.9–15.1)
IMMATURE GRANULOCYTES: 0 %
LYMPHOCYTES # BLD: 39 % (ref 24–43)
MCH RBC QN AUTO: 30.6 PG (ref 25.2–33.5)
MCHC RBC AUTO-ENTMCNC: 31.4 G/DL (ref 28.4–34.8)
MCV RBC AUTO: 97.4 FL (ref 82.6–102.9)
MONOCYTES # BLD: 9 % (ref 3–12)
NRBC AUTOMATED: 0 PER 100 WBC
PDW BLD-RTO: 14.1 % (ref 11.8–14.4)
PLATELET # BLD: 256 K/UL (ref 138–453)
PMV BLD AUTO: 9.9 FL (ref 8.1–13.5)
POTASSIUM SERPL-SCNC: 4.1 MMOL/L (ref 3.7–5.3)
RBC # BLD: 3.79 M/UL (ref 3.95–5.11)
SEG NEUTROPHILS: 42 % (ref 36–65)
SEGMENTED NEUTROPHILS ABSOLUTE COUNT: 2.19 K/UL (ref 1.5–8.1)
SODIUM BLD-SCNC: 140 MMOL/L (ref 135–144)
TOTAL PROTEIN: 6.2 G/DL (ref 6.4–8.3)
WBC # BLD: 5.3 K/UL (ref 3.5–11.3)

## 2022-11-01 PROCEDURE — 36415 COLL VENOUS BLD VENIPUNCTURE: CPT

## 2022-11-01 PROCEDURE — 80053 COMPREHEN METABOLIC PANEL: CPT

## 2022-11-01 PROCEDURE — P9603 ONE-WAY ALLOW PRORATED MILES: HCPCS

## 2022-11-01 PROCEDURE — 85025 COMPLETE CBC W/AUTO DIFF WBC: CPT

## 2022-11-08 ENCOUNTER — HOSPITAL ENCOUNTER (OUTPATIENT)
Age: 72
Setting detail: SPECIMEN
Discharge: HOME | End: 2022-11-08

## 2022-11-08 LAB
ABSOLUTE EOS #: 0.48 K/UL (ref 0–0.44)
ABSOLUTE IMMATURE GRANULOCYTE: 0.03 K/UL (ref 0–0.3)
ABSOLUTE LYMPH #: 2.03 K/UL (ref 1.1–3.7)
ABSOLUTE MONO #: 0.49 K/UL (ref 0.1–1.2)
ALBUMIN SERPL-MCNC: 3.9 G/DL (ref 3.5–5.2)
ALP BLD-CCNC: 44 U/L (ref 35–104)
ALT SERPL-CCNC: 11 U/L (ref 5–33)
ANION GAP SERPL CALCULATED.3IONS-SCNC: 10 MMOL/L (ref 9–17)
AST SERPL-CCNC: 16 U/L
BASOPHILS # BLD: 1 % (ref 0–2)
BASOPHILS ABSOLUTE: 0.05 K/UL (ref 0–0.2)
BILIRUB SERPL-MCNC: 0.3 MG/DL (ref 0.3–1.2)
BUN BLDV-MCNC: 46 MG/DL (ref 8–23)
BUN/CREAT BLD: 37 (ref 9–20)
CALCIUM SERPL-MCNC: 9.4 MG/DL (ref 8.6–10.4)
CHLORIDE BLD-SCNC: 104 MMOL/L (ref 98–107)
CO2: 29 MMOL/L (ref 20–31)
CREAT SERPL-MCNC: 1.24 MG/DL (ref 0.5–0.9)
EOSINOPHILS RELATIVE PERCENT: 9 % (ref 1–4)
GFR SERPL CREATININE-BSD FRML MDRD: 47 ML/MIN/1.73M2
GLUCOSE BLD-MCNC: 101 MG/DL (ref 70–99)
HCT VFR BLD CALC: 39.3 % (ref 36.3–47.1)
HEMOGLOBIN: 12.3 G/DL (ref 11.9–15.1)
IMMATURE GRANULOCYTES: 1 %
LYMPHOCYTES # BLD: 38 % (ref 24–43)
MCH RBC QN AUTO: 30.4 PG (ref 25.2–33.5)
MCHC RBC AUTO-ENTMCNC: 31.3 G/DL (ref 28.4–34.8)
MCV RBC AUTO: 97 FL (ref 82.6–102.9)
MONOCYTES # BLD: 9 % (ref 3–12)
NRBC AUTOMATED: 0 PER 100 WBC
PDW BLD-RTO: 13.6 % (ref 11.8–14.4)
PLATELET # BLD: 265 K/UL (ref 138–453)
PMV BLD AUTO: 9.7 FL (ref 8.1–13.5)
POTASSIUM SERPL-SCNC: 4 MMOL/L (ref 3.7–5.3)
RBC # BLD: 4.05 M/UL (ref 3.95–5.11)
SEG NEUTROPHILS: 42 % (ref 36–65)
SEGMENTED NEUTROPHILS ABSOLUTE COUNT: 2.31 K/UL (ref 1.5–8.1)
SODIUM BLD-SCNC: 143 MMOL/L (ref 135–144)
TOTAL PROTEIN: 6.7 G/DL (ref 6.4–8.3)
WBC # BLD: 5.4 K/UL (ref 3.5–11.3)

## 2022-11-08 PROCEDURE — 85025 COMPLETE CBC W/AUTO DIFF WBC: CPT

## 2022-11-08 PROCEDURE — P9603 ONE-WAY ALLOW PRORATED MILES: HCPCS

## 2022-11-08 PROCEDURE — 36415 COLL VENOUS BLD VENIPUNCTURE: CPT

## 2022-11-08 PROCEDURE — 80053 COMPREHEN METABOLIC PANEL: CPT

## 2022-11-11 ENCOUNTER — TELEPHONE (OUTPATIENT)
Dept: INTERNAL MEDICINE CLINIC | Age: 72
End: 2022-11-11

## 2022-11-11 NOTE — TELEPHONE ENCOUNTER
----- Message from Letty Cruz sent at 11/11/2022 11:33 AM EST -----  Subject: Message to Provider    QUESTIONS  Information for Provider? Ramila from [address] [facility] [address] was calling   to verify that pt's pcp will follow her with home health care. Please   return call to Ramila to confirm this.   ---------------------------------------------------------------------------  --------------  Jaime Richmond INFO  [phone]; OK to leave message on voicemail  ---------------------------------------------------------------------------  --------------  SCRIPT ANSWERS  Relationship to Patient? Third Party  Third Party Type? Home Health Care? Representative Name?  [facility]

## 2022-11-14 ENCOUNTER — TELEPHONE (OUTPATIENT)
Dept: INTERNAL MEDICINE CLINIC | Age: 72
End: 2022-11-14

## 2022-11-15 DIAGNOSIS — G25.81 RLS (RESTLESS LEGS SYNDROME): ICD-10-CM

## 2022-11-15 RX ORDER — GABAPENTIN 100 MG/1
100 CAPSULE ORAL 2 TIMES DAILY
Qty: 180 CAPSULE | Refills: 3 | Status: ON HOLD | OUTPATIENT
Start: 2022-11-15 | End: 2022-11-27 | Stop reason: SDUPTHER

## 2022-11-15 RX ORDER — PANTOPRAZOLE SODIUM 40 MG/1
40 TABLET, DELAYED RELEASE ORAL
Qty: 90 TABLET | Refills: 3 | Status: SHIPPED | OUTPATIENT
Start: 2022-11-15

## 2022-11-15 RX ORDER — PRAMIPEXOLE DIHYDROCHLORIDE 0.5 MG/1
0.5 TABLET ORAL NIGHTLY
Qty: 90 TABLET | Refills: 3 | Status: SHIPPED | OUTPATIENT
Start: 2022-11-15

## 2022-11-15 RX ORDER — BUSPIRONE HYDROCHLORIDE 5 MG/1
5 TABLET ORAL 3 TIMES DAILY
Qty: 90 TABLET | Refills: 3 | Status: SHIPPED | OUTPATIENT
Start: 2022-11-15

## 2022-11-17 ENCOUNTER — TELEPHONE (OUTPATIENT)
Dept: PHARMACY | Facility: CLINIC | Age: 72
End: 2022-11-17

## 2022-11-17 ENCOUNTER — TELEPHONE (OUTPATIENT)
Dept: INTERNAL MEDICINE CLINIC | Age: 72
End: 2022-11-17

## 2022-11-17 DIAGNOSIS — T14.8XXA FRACTURE: Primary | ICD-10-CM

## 2022-11-17 NOTE — TELEPHONE ENCOUNTER
Lida Venegas MD, please see below, appt with you 11/25/22 - would patient benefit from updated DEXA due to recent fracture? Last DEXA >3 years ago revealed osteopenia    If appropriate, please order DEXA and have your staff notify patient, or review at upcoming appt. Thank you,  Art Bautista, PharmD, [facility]  Department, toll free: [phone], option 1    ==================================================================  POPULATION HEALTH CLINICAL PHARMACY REVIEW: RECENT FRACTURE    Daniel Killian is a 71 y.o. old female patient who recently had a fracture of body of sternum and left clavicle (s/p fall; 7/10/22 Hospital encounter/admission). Lab Results   Component Value Date    VITD25 28.1 (L) 09/04/2019      Lab Results   Component Value Date    CALCIUM 9.4 11/08/2022    PHOS 3.8 09/06/2022     estimated creatinine clearance is 40 mL/min (A) (based on SCr of 1.24 mg/dL (H)).     DEXA 7/9/19:  Osteopenia    FRAX-calculated 10-year fracture probability:   Per WHO calculator: major Osteoporotic = 20% and Hip = 4.5%    Assessment:   - 71 y.o. female with recent fracture and may benefit from DEXA to assess current BMD - last DEXA >3 years ago revealed osteopenia    Considerations:  - Suggest obtaining DEXA

## 2022-11-17 NOTE — TELEPHONE ENCOUNTER
Last time, when patient was in the office, I requested  to touch base with [facility] if we can place patient at a nursing home  Please call [facility] to find out, how we can help this patient  Schedule appointment with office earliest

## 2022-11-17 NOTE — LETTER
[place]  [address], [address]        [address]  [address] [facility] [address]           12/08/22     Dear Alec Arguello,    Just a reminder - Dr. Cherelle Salter would like you to call and schedule a DEXA bone scan. This type of x-ray will help assess your risk for future fractures. A bone mineral density test (DEXA scan) is an outpatient radiological test.  It is not painful and it takes a short time to perform. We urge you to call as soon as possible to schedule this important test.  Please call [phone] to schedule your DEXA scan. Enclosed are some educational materials about DEXA scans and calcium. If you have additional questions, please call us or your provider.     [address]  Phone: [phone], option 1

## 2022-11-17 NOTE — TELEPHONE ENCOUNTER
Reporting patient has fallen 5 times in the last 2 days according to her . Patient's right eye is bruised and has a superficial spot on her left shin. ( A pretty good skin care) She is on blood thinners.     Please Advise

## 2022-11-18 ENCOUNTER — CARE COORDINATION (OUTPATIENT)
Dept: CARE COORDINATION | Age: 72
End: 2022-11-18

## 2022-11-18 NOTE — CARE COORDINATION
Ambulatory Care Coordination Note  11/18/2022    ACC: Shukri Aguilar, RN  PCP referral  Spoke with patient, explained care coordination. Patient is accepting of program.  Patient returned home from rehab recently and has had 5 falls since. No significant injuries, small cut by her eye. She stated she is not tripping, her walker is too large and it hits the trim on their wall and makes her fall. She would like a smaller walker, she stated her PT is looking into getting an order but that medicare paid for this one about 3 years ago. She agreed to [facility] And PRAFUL You LSW. For assistance in seeing if the ability center would swap her for a smaller one. Patient wants to stay home as long as she can, declined assisted living or long term care for now. Offered patient enrollment in the Remote Patient Monitoring (RPM) program for in-home monitoring: Patient declined. Ambulatory Care Coordination Assessment    Care Coordination Protocol  Referral from Primary Care Provider: Yes  Week 1 - Initial Assessment     Do you have all of your prescriptions and are they filled?: Yes  Are you able to afford your medications?: Yes  How often do you have trouble taking your medications the way you have been told to take them?: I always take them as prescribed. Ability to seek help/take action for Emergent Urgent situations i.e. fire, crime, inclement weather or health crisis. : Independent  Ability to ambulate to restroom: Independent  Ability handle personal hygeine needs (bathing/dressing/grooming): Independent  Ability to manage Medications:  Independent  Ability to prepare Food Preparation: Needs Assistance  Ability to maintain home (clean home, laundry): Needs Assistance  Ability to drive and/or has transportation: Dependent  Ability to do shopping: Needs Assistance  Ability to manage finances: Needs Assistance  Is patient able to live independently?: Yes Current Housing: Private Residence              Are you experiencing loss of meaning?: No  Are you experiencing loss of hope and peace?: No     Thinking about your patient's physical health needs, are there any symptoms or problems (risk indicators) you are unsure about that require further investigation?: No identified areas of uncertainly or problems already being investigated   Are the patients physical health problems impacting on their mental well-being?: No identified areas of concern   Are there any problems with your patients lifestyle behaviors (alcohol, drugs, diet, exercise) that are impacting on physical or mental well-being?: No identified areas of concern   Do you have any other concerns about your patients mental well-being? How would you rate their severity and impact on the patient?: No identified areas of concern   How would you rate their home environment in terms of safety and stability (including domestic violence, insecure housing, neighbor harassment)?: Consistently safe, supportive, stable, no identified problems   How do daily activities impact on the patient's well-being? (include current or anticipated unemployment, work, caregiving, access to transportation or other): No identified problems or perceived positive benefits   How would you rate their social network (family, work, friends)?: Restricted participation with some degree of social isolation   How would you rate their financial resources (including ability to afford all required medical care)?: Financially secure, some resource challenges   How wells does the patient now understand their health and well-being (symptoms, signs or risk factors) and what they need to do to manage their health?: Reasonable to good understanding and already engages in managing health or is willing to undertake better management   How well do you think your patient can engage in healthcare discussions?  (Barriers include language, deafness, aphasia, alcohol or drug problems, learning difficulties, concentration): Clear and open communication, no identified barriers   Do other services need to be involved to help this patient?: Other care/services in place and adequate   Are current services involved with this patient well-coordinated? (Include coordination with other services you are now recommendation): Required care/services in place and adequately coordinated   Suggested Interventions and Community Resources  Fall Risk Prevention: In Process Home Health Services: Completed   Social Work: In Process   Zone Management Tools: In Process                  Prior to Admission medications    Medication Sig Start Date End Date Taking? Authorizing Provider   busPIRone (BUSPAR) 5 MG tablet Take 1 tablet by mouth 3 times daily 11/15/22   Luis Quiroz MD   gabapentin (NEURONTIN) 100 MG capsule Take 1 capsule by mouth 2 times daily for 30 days.  11/15/22 12/15/22  Luis Quiroz MD   pantoprazole (PROTONIX) 40 MG tablet Take 1 tablet by mouth every morning (before breakfast) 11/15/22   Luis Quiroz MD   pramipexole (MIRAPEX) 0.5 MG tablet Take 1 tablet by mouth nightly 11/15/22   Luis Quiroz MD   apixaban (ELIQUIS) 5 MG TABS tablet Take 1 tablet by mouth 2 times daily 10/20/22   Luis Quiroz MD   carvedilol (COREG) 12.5 MG tablet Take 1 tablet by mouth 2 times daily 10/20/22   Luis Quiroz MD   citalopram (CELEXA) 20 MG tablet Take 1 tablet by mouth daily 10/20/22   Luis Quiroz MD   atorvastatin (LIPITOR) 20 MG tablet Take 1 tablet by mouth nightly 10/20/22   Luis Quiroz MD   furosemide (LASIX) 40 MG tablet Take 1 tablet by mouth daily 10/20/22   Luis Quiroz MD   fluticasone (FLONASE) 50 MCG/ACT nasal spray 1 spray by Each Nostril route daily 10/20/22   Luis Quiroz MD   ferrous sulfate (IRON 325) 325 (65 Fe) MG tablet Take 325 mg by mouth daily (with breakfast)    Historical Provider, MD   vitamin D (CHOLECALCIFEROL) 25 MCG (1000 UT) TABS tablet Take 1,000 Units by mouth at bedtime    Historical Provider, MD   Skin Protectants, Misc. (HYDROCERIN) CREA cream Apply topically 2 times daily  Patient not taking: No sig reported 7/18/22   Kina Sue MD   fenofibrate micronized (LOFIBRA) 134 MG capsule Take 1 capsule by mouth every morning (before breakfast) 5/23/22   Dejuan Sndyer MD   albuterol-ipratropium (COMBIVENT RESPIMAT)  MCG/ACT AERS inhaler Inhale 1 puff into the lungs every 6 hours as needed for Wheezing or Shortness of Breath  Patient not taking: Reported on 9/6/2022 4/10/22 9/9/22  Stephon Pacheco MD   amLODIPine (NORVASC) 5 MG tablet Take 1 tablet by mouth daily 4/10/22 7/21/22  Stephon Pacheco MD   melatonin 3 MG TABS tablet Take 2 tablets by mouth nightly as needed (insomnia) 4/7/22   Stephon Pacheco MD   acetaminophen (TYLENOL) 325 MG tablet Take 2 tablets by mouth every 4 hours as needed for Pain 4/7/22 9/9/22  Stephon Pacheco MD   nitroGLYCERIN (NITROSTAT) 0.4 MG SL tablet Place 1 tablet under the tongue every 5 minutes as needed for Chest pain 9/1/21   Dejuan Snyder MD   cyanocobalamin ([employer] VITAMIN B12) 1000 MCG tablet Take 1 tablet by mouth daily 12/3/20   Dejuan Snyder MD   Lancets MISC 1 each by Does not apply route daily  Patient not taking: No sig reported 10/10/19   Natalio Bauer MD   blood glucose monitor strips Test 2 times a day & as needed for symptoms of irregular blood glucose.   Patient not taking: No sig reported 10/10/19   Natalio Bauer MD   Calcium Carbonate-Vitamin D 500-125 MG-UNIT TABS Take 1 tablet by mouth nightly     Historical Provider, MD       Future Appointments   Date Time Provider Britni Marshall   11/25/2022 11:15 AM Dejuan Snyder MD [address]   12/7/2022  1:00 PM Felicita Peña MD [address]     ,   Diabetes Assessment      Meal Planning: Other   How often do you test your blood sugar?: Other       No patient-reported symptoms        ,   Congestive Heart Failure Assessment Are you currently restricting fluids?: Other  Do you understand a low sodium diet?: Yes  Do you understand how to read food labels?: Yes  Do you salt your food before tasting it?: No     No patient-reported symptoms      Symptoms:  CHF associated leg swelling: Neg      Symptom course: stable  Weight trend: stable  Salt intake watch compared to last visit: stable     , and   General Assessment    Do you have any symptoms that are causing concern?: No

## 2022-11-21 ENCOUNTER — CARE COORDINATION (OUTPATIENT)
Dept: CARE COORDINATION | Age: 72
End: 2022-11-21

## 2022-11-21 NOTE — CARE COORDINATION
Patient has been referred to  by Emely Steven/[facility],  who states that this patient has received a walker from Medicare and   finds that the walker is too large. Patient is interested in finding out if  the [facility] has a smaller walker that she can trade her's for. the [facility] attempted to contact the [facility]. There was no answer,   left a message and provided contact information.  also attempted  to contact the patient and there was no answer.  left a message for   the patient and informed her that the [facility] will attempt to call her tomorrow  as well.     Plan of Care  [facility] will reach out to the [facility] and the patient too

## 2022-11-22 ENCOUNTER — HOSPITAL ENCOUNTER (OUTPATIENT)
Age: 72
Setting detail: SPECIMEN
Discharge: HOME | End: 2022-11-22

## 2022-11-22 ENCOUNTER — TELEPHONE (OUTPATIENT)
Dept: INTERNAL MEDICINE CLINIC | Age: 72
End: 2022-11-22

## 2022-11-22 DIAGNOSIS — E11.59 TYPE 2 DIABETES MELLITUS WITH OTHER CIRCULATORY COMPLICATION, WITHOUT LONG-TERM CURRENT USE OF INSULIN (HCC): ICD-10-CM

## 2022-11-22 DIAGNOSIS — Z13.220 SCREENING FOR HYPERLIPIDEMIA: ICD-10-CM
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
## 2022-11-22 DIAGNOSIS — E55.9 VITAMIN D DEFICIENCY: ICD-10-CM

## 2022-11-22 DIAGNOSIS — I10 ESSENTIAL HYPERTENSION: ICD-10-CM

## 2022-11-22 LAB
CHOLESTEROL/HDL RATIO: 2.3
CHOLESTEROL: 140 MG/DL
CREATININE URINE: 145.7 MG/DL (ref 28–217)
HDLC SERPL-MCNC: 60 MG/DL
LDL CHOLESTEROL: 67 MG/DL (ref 0–130)
MICROALBUMIN/CREAT 24H UR: 13 MG/L
MICROALBUMIN/CREAT UR-RTO: 9 MCG/MG CREAT
TRIGL SERPL-MCNC: 66 MG/DL
VITAMIN D 25-HYDROXY: 28.9 NG/ML

## 2022-11-22 NOTE — TELEPHONE ENCOUNTER
Noted below, thank you for reviewing!    =======================================================   For Pharmacy Admin Tracking Only    CPA in place:  No  Recommendation Provided To: Provider: 1 via Note to Provider  Intervention Detail: Lab(s) Ordered  Gap Closed?: No   Intervention Accepted By: Provider: 1  Time Spent (min): 15

## 2022-11-23 ENCOUNTER — TELEPHONE (OUTPATIENT)
Dept: INTERNAL MEDICINE CLINIC | Age: 72
End: 2022-11-23

## 2022-11-23 ENCOUNTER — CARE COORDINATION (OUTPATIENT)
Dept: CARE COORDINATION | Age: 72
End: 2022-11-23

## 2022-11-23 DIAGNOSIS — L03.116 CELLULITIS OF LEFT LOWER EXTREMITY: Primary | ICD-10-CM

## 2022-11-23 RX ORDER — CEPHALEXIN 500 MG/1
500 CAPSULE ORAL 3 TIMES DAILY
Qty: 30 CAPSULE | Refills: 0 | Status: ON HOLD | OUTPATIENT
Start: 2022-11-23 | End: 2022-11-26 | Stop reason: ALTCHOICE

## 2022-11-23 NOTE — CARE COORDINATION
"                                             Progress Note    Client Name: Bhavin Awad  Date: 11-08-18         Service Type: Individual      Session Start Time: 2:05  Session End Time: 3:00      Session Length: 38- 52  min     Session #: 5     Attendees: Client    Treatment Plan Last Reviewed: today  PHQ-9 / JOHNNIE-7 : see flow sheets     DATA      Progress Since Last Session (Related to Symptoms / Goals / Homework):   Symptoms: no change overall stable but with anxiety, stress, sleep, appetite, energy  disruption, feeling bad about self, mood disruption    Homework: Completed in session      Episode of Care Goals: Satisfactory progress - PREPARATION (Decided to change - considering how); Intervened by negotiating a change plan and determining options / strategies for behavior change, identifying triggers, exploring social supports, and working towards setting a date to begin behavior change.    Is working with Lainey with [facility].   Disability services- will be filling out an application.    Has help from [employer] [phone]. [phone] Disability Linkage Line.  Goals: 1) manage pain, 2) get his own place 3) money for the dl.     Current / Ongoing Stressors and Concerns:   Housing and mood disruption   Family discourse - son and daughter -in -law and \"kicked him out\" of the house after an argument on  parenting   Concerned with daughter in law's drinking     Treatment Objective(s) Addressed in This Session:   Support     Intervention:     Reviewed symptom, stressors, skills used since last visit.    Client reports updates:  Has started medication and has had an increase in dosage.    Remains on SNAP, has some limited money through [place]  Completed phone appointment with SSDI.   Remains in 24 hour housing in [facility] ( [address]).  No recent time with his son and 2 grand kids.     IS working with Juani from  care coordination in for resource support- options of  [facility] and [facility] " [facility] phoned the patient and was able ot make contact with her. HC made introductions and stated the reason for the call. The  [facility] shared with the patient that she has contacted the Health system and was told that they have some walkers and may be  Able to assist her. HC was told to have the patient contact them. HC provided the patient with the contact number for the Health system.     Plan of Care   [facility] will follow up with the patient next week process.    Focus of session:   Reviewed barriers to success.  Client shares that his learning disabilty ( dx in high school)   And his joint pain get in the way of being able to provide for himself.      In past did AA  (over 10 years ago- court related).  Feels that he used alcohol to be able to talk to people.   Quit drinking on his own about 5 years ago.    Processed emotions.        ASSESSMENT: Current Emotional / Mental Status (status of significant symptoms):   Risk status (Self / Other harm or suicidal ideation)   Client denies current fears or concerns for personal safety.   Client denies current or recent suicidal ideation or behaviors.   Client denies current or recent homicidal ideation or behaviors.   Client denies current or recent self injurious behavior or ideation.   Client denies other safety concerns.   Client Client reports there has been a change in risk factors since their last session.  using a homeless shelter   Client Client reports there has been no change in protective factors since their last session.     A safety and risk management plan has not been developed at this time, however client was given the after-hours number / 911 should there be a change in any of these risk factors.     Appearance:   Appropriate    Eye Contact:   Good    Psychomotor Behavior: Normal    Attitude:   Cooperative    Orientation:   All   Speech    Rate / Production: Normal     Volume:  Normal    Mood:    Anxious  Depressed  Normal   Affect:    Appropriate    Thought Content:  Clear    Thought Form:  Coherent  Logical    Insight:    Good      Medication Review:   No current psychiatric medications prescribed     Medication Compliance:   NA     Changes in Health Issues:   None reported     Chemical Use Review:   Substance Use: Chemical use reviewed, no active concerns identified      Tobacco Use: No current tobacco use.       Collateral Reports Completed:   Routed note to PCP as needed.    PLAN: (Client Tasks /  "Therapist Tasks / Other)   1) daily problem solving steps - food /sheltr  2) manage pain, 3) get his own place 4) money for the 's license.  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    Anticipated number of session or this episode of care: 12      MeasurableTreatment Goal(s) related to diagnosis / functional impairment(s)  Goal 1: Client will attend to  problem solving daily. Connect with social workers timely.    I will know I've met my goal when .  stable housing / complete papers enroll in programs to help himself.    Objective #A (Client Action)    Client will compile a list of boundaries that they would like to set with others. Increase diplomacy.  Status: New - Date: 10-02-18     Intervention(s)  Therapist will assit with problem solving.    MeasurableTreatment Goal(s) related to diagnosis / functional impairment(s)  Goal 2:  Client will begin to understand barriers and participate in action steps for a more stable future.  Objective #A (Client Action)    Client will identify triggers.ex \"I tell it like it is\"  .  Ex  All or none thinking which contributes to anxiety .    Status: New - Date: 10-08-18     Intervention(s)  Therapist will assit with teaching skills to manage triggers, move to problem solving.      Client has reviewed and agreed to the above plan.      Sharon Adame LP  October 2, 2018  "

## 2022-11-25 ENCOUNTER — OFFICE VISIT (OUTPATIENT)
Dept: INTERNAL MEDICINE CLINIC | Age: 72
End: 2022-11-25
Payer: MEDICARE

## 2022-11-25 ENCOUNTER — HOSPITAL ENCOUNTER (INPATIENT)
Age: 72
LOS: 2 days | Discharge: NURSING, REHAB OR LONG-TERM CARE | DRG: 603 | End: 2022-11-27
Attending: EMERGENCY MEDICINE | Admitting: INTERNAL MEDICINE
Payer: MEDICARE

## 2022-11-25 ENCOUNTER — APPOINTMENT (OUTPATIENT)
Dept: GENERAL RADIOLOGY | Age: 72
DRG: 603 | End: 2022-11-25
Payer: MEDICARE

## 2022-11-25 VITALS — SYSTOLIC BLOOD PRESSURE: 138 MMHG | DIASTOLIC BLOOD PRESSURE: 82 MMHG

## 2022-11-25 DIAGNOSIS — I63.429 CEREBROVASCULAR ACCIDENT (CVA) DUE TO EMBOLISM OF ANTERIOR CEREBRAL ARTERY, UNSPECIFIED BLOOD VESSEL LATERALITY (HCC): Primary | ICD-10-CM

## 2022-11-25 DIAGNOSIS — L03.116 CELLULITIS OF LEFT LOWER EXTREMITY: Primary | ICD-10-CM

## 2022-11-25 DIAGNOSIS — M46.1 SACROILIITIS (HCC): ICD-10-CM

## 2022-11-25 DIAGNOSIS — F33.1 MAJOR DEPRESSIVE DISORDER, RECURRENT, MODERATE (HCC): ICD-10-CM

## 2022-11-25 PROBLEM — L03.90 CELLULITIS: Status: ACTIVE | Noted: 2022-11-25

## 2022-11-25 LAB
ABSOLUTE EOS #: 0.6 K/UL (ref 0–0.4)
ABSOLUTE LYMPH #: 2 K/UL (ref 1–4.8)
ABSOLUTE MONO #: 0.6 K/UL (ref 0.1–1.3)
ANION GAP SERPL CALCULATED.3IONS-SCNC: 9 MMOL/L (ref 9–17)
BASOPHILS # BLD: 1 % (ref 0–2)
BASOPHILS ABSOLUTE: 0.1 K/UL (ref 0–0.2)
BUN BLDV-MCNC: 26 MG/DL (ref 8–23)
CALCIUM SERPL-MCNC: 9.1 MG/DL (ref 8.6–10.4)
CHLORIDE BLD-SCNC: 109 MMOL/L (ref 98–107)
CO2: 26 MMOL/L (ref 20–31)
CREAT SERPL-MCNC: 1.02 MG/DL (ref 0.5–0.9)
EOSINOPHILS RELATIVE PERCENT: 9 % (ref 0–4)
GFR SERPL CREATININE-BSD FRML MDRD: 59 ML/MIN/1.73M2
GLUCOSE BLD-MCNC: 105 MG/DL (ref 70–99)
HCT VFR BLD CALC: 35.5 % (ref 36–46)
HEMOGLOBIN: 11.5 G/DL (ref 12–16)
LYMPHOCYTES # BLD: 30 % (ref 24–44)
MCH RBC QN AUTO: 30.3 PG (ref 26–34)
MCHC RBC AUTO-ENTMCNC: 32.3 G/DL (ref 31–37)
MCV RBC AUTO: 93.6 FL (ref 80–100)
MONOCYTES # BLD: 9 % (ref 1–7)
PDW BLD-RTO: 14.1 % (ref 11.5–14.9)
PLATELET # BLD: 276 K/UL (ref 150–450)
PMV BLD AUTO: 7.8 FL (ref 6–12)
POTASSIUM SERPL-SCNC: 4.4 MMOL/L (ref 3.7–5.3)
RBC # BLD: 3.79 M/UL (ref 4–5.2)
SEG NEUTROPHILS: 51 % (ref 36–66)
SEGMENTED NEUTROPHILS ABSOLUTE COUNT: 3.4 K/UL (ref 1.3–9.1)
SODIUM BLD-SCNC: 144 MMOL/L (ref 135–144)
WBC # BLD: 6.6 K/UL (ref 3.5–11)

## 2022-11-25 PROCEDURE — 6360000002 HC RX W HCPCS: Performed by: STUDENT IN AN ORGANIZED HEALTH CARE EDUCATION/TRAINING PROGRAM

## 2022-11-25 PROCEDURE — 3074F SYST BP LT 130 MM HG: CPT | Performed by: INTERNAL MEDICINE

## 2022-11-25 PROCEDURE — 99214 OFFICE O/P EST MOD 30 MIN: CPT | Performed by: INTERNAL MEDICINE

## 2022-11-25 PROCEDURE — 36415 COLL VENOUS BLD VENIPUNCTURE: CPT

## 2022-11-25 PROCEDURE — 85025 COMPLETE CBC W/AUTO DIFF WBC: CPT

## 2022-11-25 PROCEDURE — G8399 PT W/DXA RESULTS DOCUMENT: HCPCS | Performed by: INTERNAL MEDICINE

## 2022-11-25 PROCEDURE — 2580000003 HC RX 258: Performed by: STUDENT IN AN ORGANIZED HEALTH CARE EDUCATION/TRAINING PROGRAM

## 2022-11-25 PROCEDURE — 80048 BASIC METABOLIC PNL TOTAL CA: CPT

## 2022-11-25 PROCEDURE — 1123F ACP DISCUSS/DSCN MKR DOCD: CPT | Performed by: INTERNAL MEDICINE

## 2022-11-25 PROCEDURE — 3017F COLORECTAL CA SCREEN DOC REV: CPT | Performed by: INTERNAL MEDICINE

## 2022-11-25 PROCEDURE — G8417 CALC BMI ABV UP PARAM F/U: HCPCS | Performed by: INTERNAL MEDICINE

## 2022-11-25 PROCEDURE — 96365 THER/PROPH/DIAG IV INF INIT: CPT

## 2022-11-25 PROCEDURE — 6370000000 HC RX 637 (ALT 250 FOR IP): Performed by: STUDENT IN AN ORGANIZED HEALTH CARE EDUCATION/TRAINING PROGRAM

## 2022-11-25 PROCEDURE — 1090F PRES/ABSN URINE INCON ASSESS: CPT | Performed by: INTERNAL MEDICINE

## 2022-11-25 PROCEDURE — 99285 EMERGENCY DEPT VISIT HI MDM: CPT

## 2022-11-25 PROCEDURE — G8484 FLU IMMUNIZE NO ADMIN: HCPCS | Performed by: INTERNAL MEDICINE

## 2022-11-25 PROCEDURE — 73590 X-RAY EXAM OF LOWER LEG: CPT

## 2022-11-25 PROCEDURE — 1036F TOBACCO NON-USER: CPT | Performed by: INTERNAL MEDICINE

## 2022-11-25 PROCEDURE — G8427 DOCREV CUR MEDS BY ELIG CLIN: HCPCS | Performed by: INTERNAL MEDICINE

## 2022-11-25 PROCEDURE — 3078F DIAST BP <80 MM HG: CPT | Performed by: INTERNAL MEDICINE

## 2022-11-25 PROCEDURE — 1200000000 HC SEMI PRIVATE

## 2022-11-25 RX ORDER — ACETAMINOPHEN 325 MG/1
650 TABLET ORAL ONCE
Status: COMPLETED | OUTPATIENT
Start: 2022-11-25 | End: 2022-11-25

## 2022-11-25 RX ADMIN — VANCOMYCIN HYDROCHLORIDE 2000 MG: 1 INJECTION, POWDER, LYOPHILIZED, FOR SOLUTION INTRAVENOUS at 17:50

## 2022-11-25 RX ADMIN — ACETAMINOPHEN 650 MG: 325 TABLET ORAL at 16:38

## 2022-11-25 ASSESSMENT — ENCOUNTER SYMPTOMS
EYE DISCHARGE: 0
SHORTNESS OF BREATH: 0
EYE ITCHING: 0
ABDOMINAL PAIN: 0
CHEST TIGHTNESS: 0
DIARRHEA: 0
COUGH: 0
RHINORRHEA: 0
CONSTIPATION: 0
CHOKING: 0
ABDOMINAL PAIN: 0
SINUS PRESSURE: 0
COUGH: 0
NAUSEA: 0
BACK PAIN: 0
PHOTOPHOBIA: 0
EYE PAIN: 0
EYE PAIN: 0
COLOR CHANGE: 1
EYE REDNESS: 0
CHEST TIGHTNESS: 0
SORE THROAT: 0
BLOOD IN STOOL: 0
VOMITING: 0
CONSTIPATION: 0
SHORTNESS OF BREATH: 0
EYE REDNESS: 0
DIARRHEA: 0
COLOR CHANGE: 0
ABDOMINAL DISTENTION: 0
APNEA: 0

## 2022-11-25 NOTE — ED PROVIDER NOTES
[address] ED  eMERGENCY dEPARTMENT eNCOUnter   Attending Attestation     Pt Name: Cyn Hutchison  MRN: [id number]  [place] 1950  Date of evaluation: 11/25/22    History, EXAM, MDM:    Cyn Hutchison is a 71 y.o. female who presents with Cellulitis and Fall    71 yo with weakness and frequent falls sent from her internal medicine clinic. She is on anticoagulation. No head trauma. No headache. She has cellulitis of the left lower leg. Pt will need admission, IV antiobitics, PT/OT evaluations. Starting IV antibiotics. Vitals:   Vitals:    11/25/22 1237   BP: (!) 152/86   Pulse: 62   Resp: 18   Temp: 97.9 °F (36.6 °C)   SpO2: 94%   Weight: 180 lb (81.6 kg)   Height: 5' 3\" (1.6 m)     I performed a history and physical examination of the patient and discussed management with the resident. I reviewed the residents note and agree with the documented findings and plan of care. Any areas of disagreement are noted on the chart. I was personally present for the key portions of any procedures. I have documented in the chart those procedures where I was not present during the key portions. I have personally reviewed all images and agree with the resident's interpretation. I have reviewed the emergency nurses triage note. I agree with the chief complaint, past medical history, past surgical history, allergies, medications, social and family history as documented unless otherwise noted below. Documentation of the HPI, Physical Exam and Medical Decision Making performed by medical students or scribes is based on my personal performance of the HPI, PE and MDM. For Phys Assistant/ Nurse Practitioner cases/documentation I have had a face to face evaluation of this patient and have completed at least one if not all key elements of the E/M (history, physical exam, and MDM). Additional findings are as noted.     Haider Carmichael MD  Attending Emergency  Physician                Haider Carmichael, MD  11/25/22 [address]  Irena Breen MD  11/26/22 2020

## 2022-11-25 NOTE — PROGRESS NOTES
Subjective:      Patient ID: Mahesh Minor is a 71 y.o. female. HPI-patient past medical history multiple medical problem which include obesity, history of stroke with Left sided weekness , heart failure with preserved ejection fraction, chronic DVT on anticoagulation, hypertension  Patient was recently admitted in the hospital with cellulitis,  Patient and her  at bedside feel that patient is very weak, she has increased swelling of the legs  Had > 1 blood Clot in her life , one in Feb, 22, One was in 8/2017   Had [place] C 5 times she is at Home on  11/14   Noticed pain/Swelling in Left leg 1 week ago   Keflex was sent to her pharmacy, patient unfortunately did not get her prescription filled  Review of Systems   Constitutional:  Positive for unexpected weight change (weight gain). Negative for activity change, appetite change, chills, diaphoresis, fatigue and fever. HENT:  Negative for congestion, dental problem, drooling and ear discharge. Eyes:  Negative for pain, discharge, redness and itching. Respiratory:  Negative for apnea, cough, choking, chest tightness and shortness of breath. Cardiovascular:  Positive for leg swelling. Negative for chest pain. Gastrointestinal:  Negative for abdominal distention, abdominal pain, blood in stool, constipation and diarrhea. Endocrine: Negative for cold intolerance and heat intolerance. Genitourinary:  Negative for difficulty urinating, dysuria, enuresis, flank pain and frequency. Musculoskeletal:  Positive for arthralgias (Pain in Left leg with Redness and swelling). Negative for back pain, gait problem and joint swelling. Skin:  Negative for color change, pallor and rash. Neurological:  Positive for dizziness and weakness (weekness on Left side of body , wheel chair bound). Negative for facial asymmetry, light-headedness, numbness and headaches.         Recurent Falls    Psychiatric/Behavioral:  Negative for agitation, behavioral problems, confusion, decreased concentration and dysphoric mood. Objective:   Physical Exam  Constitutional:       Appearance: She is well-developed. She is not diaphoretic. Comments: Wheel chair bound    HENT:      Head: Normocephalic and atraumatic. Mouth/Throat:      Pharynx: No oropharyngeal exudate. Eyes:      General: No scleral icterus. Right eye: No discharge. Left eye: No discharge. Conjunctiva/sclera: Conjunctivae normal.      Pupils: Pupils are equal, round, and reactive to light. Neck:      Thyroid: No thyromegaly. Vascular: No JVD. Trachea: No tracheal deviation. Cardiovascular:      Rate and Rhythm: Normal rate. Heart sounds: Normal heart sounds. No murmur heard. No gallop. Pulmonary:      Effort: Pulmonary effort is normal. No respiratory distress. Breath sounds: Normal breath sounds. No stridor. No wheezing or rales. Chest:      Chest wall: No tenderness. Abdominal:      General: Bowel sounds are normal. There is no distension. Palpations: Abdomen is soft. Tenderness: There is no abdominal tenderness. There is no guarding or rebound. Musculoskeletal:         General: Swelling (2 plus Pitting pedal edema in bothlegs) and tenderness (with redness and swelling of Left leg) present. Normal range of motion. Cervical back: Normal range of motion and neck supple. Neurological:      Mental Status: She is alert and oriented to person, place, and time. Assessment / Plan:    1. Cerebrovascular accident (CVA) due to embolism of anterior cerebral artery, unspecified blood vessel laterality ([address]      2. Sacroiliitis ([address]      3.  Major depressive disorder, recurrent, moderate (HCC)      Patient has multiple medical problems, history of stroke with left-sided weakness, history of multiple DVTs, recurrent fall, on anticoagulation, diabetes  Patient was spent told me that he cannot take care of her  [facility], got [facility] in 2 weeks  Patient will need placement  Patient is a fall risk, need clearance from heme-onc if he can discontinue her anticoagulation, I believe given fall risk with 5 falls in 2 days, her bleeding risk overweighs her benefit of being on anticoagulation with history of 2 DVTs  Family voiced understanding  Will discuss with heme-onc  Will need IV antibiotics for cellulitis of left leg  Discussed with ED Physician       Return in about 4 weeks (around 12/23/2022). Reviewed prior labs and health maintenance. Discussed use, benefit, and side effects of prescribed medications. Barriers to medication compliance addressed. All patient questions answered. Pt voiced understanding. Rebekah Wall MD  [facility]  11/25/2022, 12:31 PM    Please note that this chart was generated using voice recognition Dragon dictation software. Although every effort was made to ensure the accuracy of this automated transcription, some errors in transcription may have occurred. Visit Information    Have you changed or started any medications since your last visit including any over-the-counter medicines, vitamins, or herbal medicines? no   Are you having any side effects from any of your medications? -  no  Have you stopped taking any of your medications? Is so, why? -  no    Have you seen any other physician or provider since your last visit? No  Have you had any other diagnostic tests since your last visit? No  Have you been seen in the emergency room and/or had an admission to a hospital since we last saw you? No  Have you had your routine dental cleaning in the past 6 months? no    Have you activated your [employer] account? If not, what are your barriers?  Yes     Patient Care Team:  Rebekah Wall MD as PCP - General (Internal Medicine)  Rebekah Wall MD as PCP - [facility] Empaneled Provider  Moisés Schaeffer MD as Consulting Physician (Gastroenterology)  Michael Suazo, MEENU as Ambulatory Care Manager  Laura Hopkins as Health     Medical History Review  Past Medical, Family, and Social History reviewed and does not contribute to the patient presenting condition    Health Maintenance   Topic Date Due    DTaP/Tdap/Td vaccine (1 - Tdap) Never done    Shingles vaccine (1 of 2) Never done    COVID-19 Vaccine (3 - Booster for [employer] series) 06/03/2021    Diabetic retinal exam  07/20/2021    Flu vaccine (1) 08/01/2022    Annual Wellness Visit (AWV)  11/25/2022    A1C test (Diabetic or Prediabetic)  02/01/2023    Diabetic foot exam  03/12/2023    Depression Monitoring  04/20/2023    Breast cancer screen  05/06/2023    Diabetic microalbuminuria test  11/22/2023    Lipids  11/22/2023    Colorectal Cancer Screen  10/07/2026    DEXA (modify frequency per FRAX score)  Completed    Pneumococcal 65+ years Vaccine  Completed    Hepatitis C screen  Completed    Hepatitis A vaccine  Aged Out    Hib vaccine  Aged Out    Meningococcal (ACWY) vaccine  Aged Out

## 2022-11-25 NOTE — ED PROVIDER NOTES
[facility]  Emergency Department Encounter  EmergencyMedicine Resident     Pt Name:Criselda Vasquez  MRN: [id number]  Birthdate 1950  Date of evaluation: 11/25/22  PCP:  Dell Alexandra MD      [address] [place] [address]       Chief Complaint   Patient presents with    Cellulitis    Fall       HISTORY OF PRESENT ILLNESS  (Location/Symptom, Timing/Onset, Context/Setting, Quality, Duration, Modifying Factors, Severity.)      Malena Cadet is a 71 y.o. female who presents with left lower leg redness and swelling. Patient states that she had a fall 1 week ago causing a laceration to her left shin and since then wound has not been healing and has noticed increased warmth and redness. States she gets around on a walker and has frequent falls, had 2 today. She denies ever hitting her head, no syncopal episode, states she just loses her balance and falls. She is on Eliquis which she takes daily for history of DVT. No systemic symptoms denies any fevers or chills no nausea or vomiting no chest pain or shortness of breath. Left lower leg erythematous and warm to touch, posterior tibialis nonpalpable, however pulses appreciated on Doppler.     PAST MEDICAL / SURGICAL / SOCIAL / FAMILY HISTORY      has a past medical history of Allergic rhinitis, cause unspecified, Back pain, Bowel obstruction (HCC), C. difficile diarrhea, CAD (coronary artery disease), Cardiac murmur, Cellulitis, Cellulitis, Cerebral artery occlusion with cerebral infarction ([address] COVID-19, Diverticulosis of colon (without mention of hemorrhage), GERD (gastroesophageal reflux disease), GERD (gastroesophageal reflux disease), History of blood transfusion, History of CHF (congestive heart failure), History of MI (myocardial infarction), History of ovarian cyst, History of peritonitis, HTN (hypertension), Hx of blood clots, Hyperlipidemia, Intestinal or peritoneal adhesions with obstruction (postoperative) (postinfection) ([address] Kidney infection, Lateral epicondylitis  of elbow, MDRO (multiple drug resistant organisms) resistance, Muscle strain, Other abnormal glucose, PONV (postoperative nausea and vomiting), Pre-diabetes, Restless legs syndrome (RLS), Snores, 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[address] TIA (transient ischemic attack), Uses walker, Vitamin D deficiency, Wears glasses, and Wellness examination. has a past surgical history that includes Ovary removal (); colectomy (); Appendectomy (); Ovary removal (); Hysterectomy (); Bunionectomy (Left); sinus surgery (); Colonoscopy; other surgical history (2014); cyst removal (Right); Wrist fracture surgery (Left, 2019); Upper gastrointestinal endoscopy (N/A, 2019); Upper gastrointestinal endoscopy (N/A, 2019); Upper gastrointestinal endoscopy (N/A, 2019); Abdomen surgery (); lumbar fusion (N/A, 02/10/2020); Cardiac catheterization; Cardiac catheterization (); [place] tooth extraction; lumbar fusion (N/A, 2020); back surgery; cervical fusion (2021); cervical fusion (N/A, 2021); Finger Closed Reduction (Left, 2022); and Finger Closed Reduction (Left, 2022).       Social History     Socioeconomic History    Marital status:      Spouse name: Not on file    Number of children: Not on file    Years of education: Not on file    Highest education level: Not on file   Occupational History    Occupation: retired   Tobacco Use    Smoking status: Former     Packs/day: 0.50     Years: 20.00     Pack years: 10.00     Types: Cigarettes     Start date: 1995     Quit date: 2017     Years since quittin.4    Smokeless tobacco: Never   Vaping Use    Vaping Use: Never used   Substance and Sexual Activity    Alcohol use: No     Alcohol/week: 0.0 standard drinks    Drug use: No    Sexual activity: Not on file   Other Topics Concern    Not on file   Social History Narrative    Not on file     Social Determinants of citalopram (CELEXA) 20 MG tablet Take 1 tablet by mouth daily 10/20/22   Rebekah Wall MD   atorvastatin (LIPITOR) 20 MG tablet Take 1 tablet by mouth nightly 10/20/22   Rebekah Wall MD   furosemide (LASIX) 40 MG tablet Take 1 tablet by mouth daily 10/20/22   Rebekah Wall MD   fluticasone (FLONASE) 50 MCG/ACT nasal spray 1 spray by Each Nostril route daily 10/20/22   Rebekah Wall MD   ferrous sulfate (IRON 325) 325 (65 Fe) MG tablet Take 325 mg by mouth daily (with breakfast)    Historical Provider, MD   vitamin D (CHOLECALCIFEROL) 25 MCG (1000 UT) TABS tablet Take 1,000 Units by mouth at bedtime    Historical Provider, MD   fenofibrate micronized (LOFIBRA) 134 MG capsule Take 1 capsule by mouth every morning (before breakfast) 5/23/22   Rebekah Wall MD   albuterol-ipratropium (COMBIVENT RESPIMAT)  MCG/ACT AERS inhaler Inhale 1 puff into the lungs every 6 hours as needed for Wheezing or Shortness of Breath  Patient not taking: Reported on 9/6/2022 4/10/22 9/9/22  Elpidio Chavarria MD   amLODIPine (NORVASC) 5 MG tablet Take 1 tablet by mouth daily 4/10/22 7/21/22  Elpidio Chavarria MD   melatonin 3 MG TABS tablet Take 2 tablets by mouth nightly as needed (insomnia) 4/7/22   Elpidio Chavarria MD   acetaminophen (TYLENOL) 325 MG tablet Take 2 tablets by mouth every 4 hours as needed for Pain 4/7/22 9/9/22  Elpidio Chavarria MD   cyanocobalamin ([employer] VITAMIN B12) 1000 MCG tablet Take 1 tablet by mouth daily 12/3/20   Rebekah Wall MD   Lancets MISC 1 each by Does not apply route daily  Patient not taking: No sig reported 10/10/19   Aaliyah Norton MD   blood glucose monitor strips Test 2 times a day & as needed for symptoms of irregular blood glucose.   Patient not taking: No sig reported 10/10/19   Aaliyah Norton MD   Calcium Carbonate-Vitamin D 500-125 MG-UNIT TABS Take 1 tablet by mouth nightly     Historical Provider, MD       REVIEW OF SYSTEMS    (2-9 systems for level 4, 10 or more for level 5) Review of Systems   Constitutional:  Negative for activity change, chills, diaphoresis, fatigue and fever. HENT:  Negative for congestion, rhinorrhea, sinus pressure and sore throat. Eyes:  Negative for photophobia, pain and redness. Respiratory:  Negative for cough, chest tightness and shortness of breath. Cardiovascular:  Negative for chest pain and leg swelling. Gastrointestinal:  Negative for abdominal pain, constipation, diarrhea, nausea and vomiting. Genitourinary:  Negative for dysuria, flank pain, frequency and urgency. Musculoskeletal:  Negative for arthralgias, myalgias and neck stiffness. Skin:  Positive for color change and wound. Negative for pallor and rash. Neurological:  Negative for dizziness, syncope, weakness, light-headedness, numbness and headaches. Hematological:  Bruises/bleeds easily. PHYSICAL EXAM   (up to 7 for level 4, 8 or more for level 5)      INITIAL VITALS:   /67   Pulse 65   Temp 98.2 °F (36.8 °C)   Resp 16   Ht 5' 3\" (1.6 m)   Wt 180 lb (81.6 kg)   SpO2 96%   BMI 31.89 kg/m²     Physical Exam  Constitutional:  Well developed, Well nourished. obese  HENT:  Normocephalic, Atraumatic, Bilateral external ears normal,  Nose normal.   Neck: Normal range of motion, No stridor. Eyes:   No discharge. Respiratory:   No respiratory distress, Normal breath sounds without any wheezing, rales or rhonchi. Cardiovascular: Normal S1, S2. No rubs, gallops or murmurs. Gastrointestinal:  No organomegaly, no tenderness, no rebound or guarding. Musculoskeletal:  No extremity deformity. Lymphatic: No lymphadenopathy noted  Skin:  Warm, Dry,  LLL anterior erythematous, 1cm wound,   Neurologic:  Alert & oriented x 3, Normal motor function,  No focal deficits noted.    Psychiatric:  Affect normal, Judgment normal, Mood normal.              DIFFERENTIAL  DIAGNOSIS     PLAN (LABS / IMAGING / EKG):  Orders Placed This Encounter   Procedures    XR TIBIA FIBULA LEFT (2 VIEWS)    CBC with Auto Differential    Basic Metabolic Panel w/ Reflex to MG    Telemetry monitoring - 72 hour duration    Full code    Inpatient consult to Internal Medicine    ADMIT TO INPATIENT       MEDICATIONS ORDERED:  Orders Placed This Encounter   Medications    acetaminophen (TYLENOL) tablet 650 mg    vancomycin (VANCOCIN) 2,000 mg in dextrose 5 % 500 mL IVPB     Order Specific Question:   Antimicrobial Indications     Answer:   Skin and Soft Tissue Infection    apixaban (ELIQUIS) tablet 5 mg     Order Specific Question:   Indication of Use     Answer:   History of DVT/PE (indefinite)    atorvastatin (LIPITOR) tablet 20 mg    busPIRone (BUSPAR) tablet 5 mg    pramipexole (MIRAPEX) tablet 0.5 mg    pantoprazole (PROTONIX) tablet 40 mg    melatonin tablet 6 mg    gabapentin (NEURONTIN) capsule 100 mg    fenofibrate (TRIGLIDE) tablet 160 mg    citalopram (CELEXA) tablet 20 mg    carvedilol (COREG) tablet 12.5 mg    calcium carb-cholecalciferol 250-125 MG-UNIT per tab 2 tablet    furosemide (LASIX) tablet 40 mg       DDX: Cellulitis, DVT    DIAGNOSTIC RESULTS / EMERGENCY DEPARTMENT COURSE / MDM   LAB RESULTS:  Results for orders placed or performed during the hospital encounter of 11/25/22   CBC with Auto Differential   Result Value Ref Range    WBC 6.6 3.5 - 11.0 k/uL    RBC 3.79 (L) 4.0 - 5.2 m/uL    Hemoglobin 11.5 (L) 12.0 - 16.0 g/dL    Hematocrit 35.5 (L) 36 - 46 %    MCV 93.6 80 - 100 fL    MCH 30.3 26 - 34 pg    MCHC 32.3 31 - 37 g/dL    RDW 14.1 11.5 - 14.9 %    Platelets 793 103 - 427 k/uL    MPV 7.8 6.0 - 12.0 fL    Seg Neutrophils 51 36 - 66 %    Lymphocytes 30 24 - 44 %    Monocytes 9 (H) 1 - 7 %    Eosinophils % 9 (H) 0 - 4 %    Basophils 1 0 - 2 %    Segs Absolute 3.40 1.3 - 9.1 k/uL    Absolute Lymph # 2.00 1.0 - 4.8 k/uL    Absolute Mono # 0.60 0.1 - 1.3 k/uL    Absolute Eos # 0.60 (H) 0.0 - 0.4 k/uL    Basophils Absolute 0.10 0.0 - 0.2 k/uL   Basic Metabolic Panel w/ Reflex to MG   Result Value Ref Range    Glucose 105 (H) 70 - 99 mg/dL    BUN 26 (H) 8 - 23 mg/dL    Creatinine 1.02 (H) 0.50 - 0.90 mg/dL    Est, Glom Filt Rate 59 (L) >60 mL/min/1.73m2    Calcium 9.1 8.6 - 10.4 mg/dL    Sodium 144 135 - 144 mmol/L    Potassium 4.4 3.7 - 5.3 mmol/L    Chloride 109 (H) 98 - 107 mmol/L    CO2 26 20 - 31 mmol/L    Anion Gap 9 9 - 17 mmol/L         RADIOLOGY:  XR TIBIA FIBULA LEFT (2 VIEWS)   Final Result   No acute osseous abnormality. Osteopenia. IMPRESSION: 71-year-old female with multiple comorbidities ambulating with a walker with history of frequent falls sent in by Dr. Timbo Chowdary for admitting for concern for left lower leg cellulitis. Wound with surrounding erythema and warmth, this is likely cellulitis. Low concern for DVT. Does not meet SIRS criteria, afebrile. Obtain CBC and BMP, blood cultre started on Vanco,  admission. EMERGENCY DEPARTMENT COURSE:  ED Course as of 11/26/22 0047   Fri Nov 25, 2022   1633 Tib-fib left x-ray tib-fib left, no acute fracture, no abnormalities. [QC]   2837 WBC: 6.6 [QC]   2916 Dr. Nohemy Duggan to admit [QC]      ED Course User Index  [QC] Lamar Torre MD               PROCEDURES:  US guided IV    CONSULTS:  IP CONSULT TO INTERNAL MEDICINE        FINAL IMPRESSION      1. Cellulitis of left lower extremity          DISPOSITION / PLAN     DISPOSITION Admitted 11/25/2022 07:11:22 PM      PATIENT REFERRED TO:  No follow-up provider specified.     DISCHARGE MEDICATIONS:  Current Discharge Medication List          Lamar Torre MD  Emergency Medicine Resident PGY2    (Please note that portions of thisnote were completed with a voice recognition program.  Efforts were made to edit the dictations but occasionally words are mis-transcribed.)        Lamar Torre MD  Resident  11/26/22 3024

## 2022-11-25 NOTE — ED TRIAGE NOTES
Mode of arrival (squad #, walk in, police, etc) : walk in        Chief complaint(s): Cellulitis, falls        Arrival Note (brief scenario, treatment PTA, etc). : Pt sent in by Dr Sony Christianson for admission for increased falls x5 and left lower leg cellulitis. C= \"Have you ever felt that you should Cut down on your drinking? \"  No  A= \"Have people Annoyed you by criticizing your drinking? \"  No  G= \"Have you ever felt bad or Guilty about your drinking? \"  No  E= \"Have you ever had a drink as an Eye-opener first thing in the morning to steady your nerves or to help a hangover? \"  No      Deferred []      Reason for deferring: N/A    *If yes to two or more: probable alcohol abuse. *

## 2022-11-26 PROBLEM — Z79.01 ANTICOAGULATED: Status: ACTIVE | Noted: 2022-11-26

## 2022-11-26 LAB
ANION GAP SERPL CALCULATED.3IONS-SCNC: 11 MMOL/L (ref 9–17)
BUN BLDV-MCNC: 18 MG/DL (ref 8–23)
CALCIUM SERPL-MCNC: 8.8 MG/DL (ref 8.6–10.4)
CHLORIDE BLD-SCNC: 109 MMOL/L (ref 98–107)
CO2: 22 MMOL/L (ref 20–31)
CREAT SERPL-MCNC: 0.83 MG/DL (ref 0.5–0.9)
GFR SERPL CREATININE-BSD FRML MDRD: >60 ML/MIN/1.73M2
GLUCOSE BLD-MCNC: 107 MG/DL (ref 70–99)
HCT VFR BLD CALC: 35.2 % (ref 36–46)
HEMOGLOBIN: 11.4 G/DL (ref 12–16)
MCH RBC QN AUTO: 30.6 PG (ref 26–34)
MCHC RBC AUTO-ENTMCNC: 32.4 G/DL (ref 31–37)
MCV RBC AUTO: 94.2 FL (ref 80–100)
PDW BLD-RTO: 14.5 % (ref 11.5–14.9)
PLATELET # BLD: 273 K/UL (ref 150–450)
PMV BLD AUTO: 7.2 FL (ref 6–12)
POTASSIUM SERPL-SCNC: 4 MMOL/L (ref 3.7–5.3)
PREALBUMIN: 18.9 MG/DL (ref 20–40)
RBC # BLD: 3.74 M/UL (ref 4–5.2)
SODIUM BLD-SCNC: 142 MMOL/L (ref 135–144)
WBC # BLD: 6.3 K/UL (ref 3.5–11)

## 2022-11-26 PROCEDURE — 84134 ASSAY OF PREALBUMIN: CPT

## 2022-11-26 PROCEDURE — 99223 1ST HOSP IP/OBS HIGH 75: CPT | Performed by: INTERNAL MEDICINE

## 2022-11-26 PROCEDURE — 6360000002 HC RX W HCPCS: Performed by: EMERGENCY MEDICINE

## 2022-11-26 PROCEDURE — 80048 BASIC METABOLIC PNL TOTAL CA: CPT

## 2022-11-26 PROCEDURE — 2580000003 HC RX 258: Performed by: NURSE PRACTITIONER

## 2022-11-26 PROCEDURE — 6370000000 HC RX 637 (ALT 250 FOR IP): Performed by: NURSE PRACTITIONER

## 2022-11-26 PROCEDURE — 36415 COLL VENOUS BLD VENIPUNCTURE: CPT

## 2022-11-26 PROCEDURE — 1200000000 HC SEMI PRIVATE

## 2022-11-26 PROCEDURE — 85027 COMPLETE CBC AUTOMATED: CPT

## 2022-11-26 PROCEDURE — 2580000003 HC RX 258: Performed by: EMERGENCY MEDICINE

## 2022-11-26 RX ORDER — GABAPENTIN 100 MG/1
100 CAPSULE ORAL 2 TIMES DAILY
Status: DISCONTINUED | OUTPATIENT
Start: 2022-11-26 | End: 2022-11-27 | Stop reason: HOSPADM

## 2022-11-26 RX ORDER — POTASSIUM CHLORIDE 20 MEQ/1
40 TABLET, EXTENDED RELEASE ORAL PRN
Status: DISCONTINUED | OUTPATIENT
Start: 2022-11-26 | End: 2022-11-27 | Stop reason: HOSPADM

## 2022-11-26 RX ORDER — DEXTROSE MONOHYDRATE 100 MG/ML
INJECTION, SOLUTION INTRAVENOUS CONTINUOUS PRN
Status: DISCONTINUED | OUTPATIENT
Start: 2022-11-26 | End: 2022-11-27 | Stop reason: HOSPADM

## 2022-11-26 RX ORDER — INSULIN LISPRO 100 [IU]/ML
0-4 INJECTION, SOLUTION INTRAVENOUS; SUBCUTANEOUS NIGHTLY
Status: DISCONTINUED | OUTPATIENT
Start: 2022-11-26 | End: 2022-11-26

## 2022-11-26 RX ORDER — INSULIN LISPRO 100 [IU]/ML
0-8 INJECTION, SOLUTION INTRAVENOUS; SUBCUTANEOUS
Status: DISCONTINUED | OUTPATIENT
Start: 2022-11-26 | End: 2022-11-26

## 2022-11-26 RX ORDER — SODIUM CHLORIDE 0.9 % (FLUSH) 0.9 %
10 SYRINGE (ML) INJECTION PRN
Status: DISCONTINUED | OUTPATIENT
Start: 2022-11-26 | End: 2022-11-27 | Stop reason: HOSPADM

## 2022-11-26 RX ORDER — SODIUM CHLORIDE 0.9 % (FLUSH) 0.9 %
5-40 SYRINGE (ML) INJECTION EVERY 12 HOURS SCHEDULED
Status: DISCONTINUED | OUTPATIENT
Start: 2022-11-26 | End: 2022-11-27 | Stop reason: HOSPADM

## 2022-11-26 RX ORDER — ACETAMINOPHEN 325 MG/1
650 TABLET ORAL EVERY 6 HOURS PRN
Status: DISCONTINUED | OUTPATIENT
Start: 2022-11-26 | End: 2022-11-27 | Stop reason: HOSPADM

## 2022-11-26 RX ORDER — ACETAMINOPHEN 650 MG/1
650 SUPPOSITORY RECTAL EVERY 6 HOURS PRN
Status: DISCONTINUED | OUTPATIENT
Start: 2022-11-26 | End: 2022-11-27 | Stop reason: HOSPADM

## 2022-11-26 RX ORDER — FENOFIBRATE 160 MG/1
160 TABLET ORAL DAILY
Status: DISCONTINUED | OUTPATIENT
Start: 2022-11-26 | End: 2022-11-27 | Stop reason: HOSPADM

## 2022-11-26 RX ORDER — CITALOPRAM 20 MG/1
20 TABLET ORAL DAILY
Status: DISCONTINUED | OUTPATIENT
Start: 2022-11-26 | End: 2022-11-27 | Stop reason: HOSPADM

## 2022-11-26 RX ORDER — SODIUM CHLORIDE 9 MG/ML
INJECTION, SOLUTION INTRAVENOUS PRN
Status: DISCONTINUED | OUTPATIENT
Start: 2022-11-26 | End: 2022-11-27 | Stop reason: HOSPADM

## 2022-11-26 RX ORDER — ATORVASTATIN CALCIUM 20 MG/1
20 TABLET, FILM COATED ORAL NIGHTLY
Status: DISCONTINUED | OUTPATIENT
Start: 2022-11-26 | End: 2022-11-27 | Stop reason: HOSPADM

## 2022-11-26 RX ORDER — MAGNESIUM SULFATE 1 G/100ML
1000 INJECTION INTRAVENOUS PRN
Status: DISCONTINUED | OUTPATIENT
Start: 2022-11-26 | End: 2022-11-27 | Stop reason: HOSPADM

## 2022-11-26 RX ORDER — ONDANSETRON 2 MG/ML
4 INJECTION INTRAMUSCULAR; INTRAVENOUS EVERY 6 HOURS PRN
Status: DISCONTINUED | OUTPATIENT
Start: 2022-11-26 | End: 2022-11-27 | Stop reason: HOSPADM

## 2022-11-26 RX ORDER — ONDANSETRON 4 MG/1
4 TABLET, ORALLY DISINTEGRATING ORAL EVERY 8 HOURS PRN
Status: DISCONTINUED | OUTPATIENT
Start: 2022-11-26 | End: 2022-11-27 | Stop reason: HOSPADM

## 2022-11-26 RX ORDER — ENOXAPARIN SODIUM 100 MG/ML
40 INJECTION SUBCUTANEOUS DAILY
Status: DISCONTINUED | OUTPATIENT
Start: 2022-11-26 | End: 2022-11-26 | Stop reason: SDUPTHER

## 2022-11-26 RX ORDER — POLYETHYLENE GLYCOL 3350 17 G/17G
17 POWDER, FOR SOLUTION ORAL DAILY PRN
Status: DISCONTINUED | OUTPATIENT
Start: 2022-11-26 | End: 2022-11-27 | Stop reason: HOSPADM

## 2022-11-26 RX ORDER — SODIUM CHLORIDE 9 MG/ML
INJECTION, SOLUTION INTRAVENOUS CONTINUOUS
Status: DISCONTINUED | OUTPATIENT
Start: 2022-11-26 | End: 2022-11-27 | Stop reason: HOSPADM

## 2022-11-26 RX ORDER — LANOLIN ALCOHOL/MO/W.PET/CERES
6 CREAM (GRAM) TOPICAL NIGHTLY PRN
Status: DISCONTINUED | OUTPATIENT
Start: 2022-11-26 | End: 2022-11-27 | Stop reason: HOSPADM

## 2022-11-26 RX ORDER — FUROSEMIDE 40 MG/1
40 TABLET ORAL DAILY
Status: DISCONTINUED | OUTPATIENT
Start: 2022-11-26 | End: 2022-11-27 | Stop reason: HOSPADM

## 2022-11-26 RX ORDER — POTASSIUM CHLORIDE 7.45 MG/ML
10 INJECTION INTRAVENOUS PRN
Status: DISCONTINUED | OUTPATIENT
Start: 2022-11-26 | End: 2022-11-27 | Stop reason: HOSPADM

## 2022-11-26 RX ORDER — BUSPIRONE HYDROCHLORIDE 5 MG/1
5 TABLET ORAL 3 TIMES DAILY
Status: DISCONTINUED | OUTPATIENT
Start: 2022-11-26 | End: 2022-11-27 | Stop reason: HOSPADM

## 2022-11-26 RX ORDER — PRAMIPEXOLE DIHYDROCHLORIDE 0.25 MG/1
0.5 TABLET ORAL NIGHTLY
Status: DISCONTINUED | OUTPATIENT
Start: 2022-11-26 | End: 2022-11-27 | Stop reason: HOSPADM

## 2022-11-26 RX ORDER — CARVEDILOL 12.5 MG/1
12.5 TABLET ORAL 2 TIMES DAILY WITH MEALS
Status: DISCONTINUED | OUTPATIENT
Start: 2022-11-26 | End: 2022-11-27 | Stop reason: HOSPADM

## 2022-11-26 RX ORDER — PANTOPRAZOLE SODIUM 40 MG/1
40 TABLET, DELAYED RELEASE ORAL
Status: DISCONTINUED | OUTPATIENT
Start: 2022-11-26 | End: 2022-11-27 | Stop reason: HOSPADM

## 2022-11-26 RX ADMIN — SODIUM CHLORIDE: 9 INJECTION, SOLUTION INTRAVENOUS at 02:43

## 2022-11-26 RX ADMIN — GABAPENTIN 100 MG: 100 CAPSULE ORAL at 20:16

## 2022-11-26 RX ADMIN — GABAPENTIN 100 MG: 100 CAPSULE ORAL at 07:07

## 2022-11-26 RX ADMIN — APIXABAN 5 MG: 5 TABLET, FILM COATED ORAL at 02:39

## 2022-11-26 RX ADMIN — GABAPENTIN 100 MG: 100 CAPSULE ORAL at 02:40

## 2022-11-26 RX ADMIN — FENOFIBRATE 160 MG: 160 TABLET ORAL at 07:07

## 2022-11-26 RX ADMIN — BUSPIRONE HYDROCHLORIDE 5 MG: 5 TABLET ORAL at 13:16

## 2022-11-26 RX ADMIN — CARVEDILOL 12.5 MG: 12.5 TABLET, FILM COATED ORAL at 15:49

## 2022-11-26 RX ADMIN — Medication 6 MG: at 20:58

## 2022-11-26 RX ADMIN — BUSPIRONE HYDROCHLORIDE 5 MG: 5 TABLET ORAL at 20:16

## 2022-11-26 RX ADMIN — VANCOMYCIN HYDROCHLORIDE 1250 MG: 1.25 INJECTION, POWDER, LYOPHILIZED, FOR SOLUTION INTRAVENOUS at 21:05

## 2022-11-26 RX ADMIN — PANTOPRAZOLE SODIUM 40 MG: 40 TABLET, DELAYED RELEASE ORAL at 06:48

## 2022-11-26 RX ADMIN — Medication 2 TABLET: at 20:15

## 2022-11-26 RX ADMIN — ATORVASTATIN CALCIUM 20 MG: 20 TABLET, FILM COATED ORAL at 20:17

## 2022-11-26 RX ADMIN — APIXABAN 5 MG: 5 TABLET, FILM COATED ORAL at 07:07

## 2022-11-26 RX ADMIN — BUSPIRONE HYDROCHLORIDE 5 MG: 5 TABLET ORAL at 07:07

## 2022-11-26 RX ADMIN — PRAMIPEXOLE DIHYDROCHLORIDE 0.5 MG: 0.25 TABLET ORAL at 02:39

## 2022-11-26 RX ADMIN — ATORVASTATIN CALCIUM 20 MG: 20 TABLET, FILM COATED ORAL at 02:40

## 2022-11-26 RX ADMIN — FUROSEMIDE 40 MG: 40 TABLET ORAL at 07:07

## 2022-11-26 RX ADMIN — APIXABAN 5 MG: 5 TABLET, FILM COATED ORAL at 20:16

## 2022-11-26 RX ADMIN — SODIUM CHLORIDE: 9 INJECTION, SOLUTION INTRAVENOUS at 15:49

## 2022-11-26 RX ADMIN — CARVEDILOL 12.5 MG: 12.5 TABLET, FILM COATED ORAL at 07:07

## 2022-11-26 RX ADMIN — CITALOPRAM HYDROBROMIDE 20 MG: 20 TABLET ORAL at 07:07

## 2022-11-26 RX ADMIN — ACETAMINOPHEN 650 MG: 325 TABLET ORAL at 15:46

## 2022-11-26 RX ADMIN — PRAMIPEXOLE DIHYDROCHLORIDE 0.5 MG: 0.25 TABLET ORAL at 20:17

## 2022-11-26 RX ADMIN — Medication 2 TABLET: at 02:40

## 2022-11-26 ASSESSMENT — PAIN DESCRIPTION - LOCATION: LOCATION: HEAD

## 2022-11-26 ASSESSMENT — PAIN SCALES - GENERAL
PAINLEVEL_OUTOF10: 5
PAINLEVEL_OUTOF10: 3

## 2022-11-26 ASSESSMENT — PAIN DESCRIPTION - DESCRIPTORS: DESCRIPTORS: ACHING

## 2022-11-26 NOTE — CARE COORDINATION
CASE MANAGEMENT NOTE:    Admission Date:  11/25/2022 Amanda Elizabeth is a 71 y.o.  female    Admitted for : Cellulitis [L03.90]  Cellulitis of left lower extremity [L03.116]    Met with:  Patient and Family    PCP:  [address]:  MEDICARE       Is patient alert and oriented at time of discussion:  Yes    Current Residence/ Living Arrangements:  independently at home with spouse          Current Services PTA:  Yes    Does patient go to outpatient dialysis: No  If yes, location and chair time: NA  Who is their nephrologist? NA    Is patient agreeable to VNS: Yes- current with [place] of choice provided:  Yes    List of [address] provided: Yes    VNS chosen:  Yes    DME:  straight cane, walker, shower chair, and grab bars, wheelchair,    Home Oxygen: No    Nebulizer: Yes    CPAP/BIPAP: No    Supplier: N/A    Potential Assistance Needed: Yes    SNF needed: Yes- Pt wants to Return to [facility] if possible. If not able to the she wants [facility]. Freedom of choice and list provided: Yes    Pharmacy:  [facility]       Is patient currently receiving oral anticoagulation therapy? Yes- Eliquis PTA for DVT    Is the Patient an [facility] with Readmission Risk Score greater than 14%? Yes  If yes, pt needs a follow up appointment made within 7 days. Family Members/Caregivers that pt would like involved in their care:    Yes    If yes, list name here:  spouse Isac Arellano and Debi Valdivia    Transportation Provider:  Family             Discharge Plan:  11/26/22 MEDICARE from home with spouse DME: straight cane, walker, shower chair, and grab bars VNS:current with [facility](referral sent) . Pt wants to return to [facility] referral sent. If not able to go to [facility] then [facility]. Eliquis PTA for DVT.  IV vanco , PT/OT elizabeth.  OR[employer] 21% ANNALEE NEEDS SIGNED/COMPLETED Following for needs//JF                           Electronically signed by: Jigar Faye RN on 11/26/2022 at 8:18 AM

## 2022-11-26 NOTE — H&P
[address] Internal Medicine  Janine Sauceda MD; Cleo Lockett MD; Elfego Adamson MD; MD Peewee Bernal MD; MD GE[facility]   [facility]    HISTORY AND PHYSICAL EXAMINATION            Date:   11/26/2022  Patient name:  Cam Garay  Date of admission:  11/25/2022  3:34 PM  MRN:   [id number]  Account:  [de-identified]  YOB: 1950  PCP:    Mayur Vazquez MD  Room:   2064/2064-01  Code Status:    Full Code    Chief Complaint:     Chief Complaint   Patient presents with    Cellulitis    Fall       History Obtained From:     patient    History of Present Illness:     Cam Garay is a 71 y.o. Non- / non  female who presents with Cellulitis and Fall   and is admitted to the hospital for the management of Cellulitis. According to patient, she was ED today after being seen by her PCP d/t concerns of cellulitis in left lower extremity  Patient has a history of left lower extremity cellulitis and presented with erythema and edema of left lower extremity. Additionally, there is a 1 cm scabbed laceration on her medial left shin from a fall that occurred approximately 1 week ago. (See image in media tab). Symptoms are associated with generalized weakness and frequent falls. Patient reported having 2 falls today. Family voiced concerns to PCP about inability to provide adequate care at home. Patient denies fever, chills, chest pain, cough, abdominal pain, nausea, vomiting, diarrhea, and urinary symptoms. There are no aggravating or alleviating factors. Symptoms are reported as constant and moderate.           Patient status inpatient in the Med/Surge      Past Medical History:     Past Medical History:   Diagnosis Date    Allergic rhinitis, cause unspecified     Back pain     lumbar    Bowel obstruction (HCC)     history of due to scar tissue, resolved non-surgically    C. difficile diarrhea     CAD (coronary artery disease)     no stent needed per pt.  Dr. Larry López did cath at [facility] 2005    Cardiac murmur     Cellulitis     left leg    Cellulitis 2017 August    leg left leg/bug bite    Cerebral artery occlusion with cerebral infarction [facility])     TIA 2014    COVID-19     ONE YR AGO IN 4/25/2020 fever and cough    Diverticulosis of colon (without mention of hemorrhage)     GERD (gastroesophageal reflux disease)     GERD (gastroesophageal reflux disease)     on rx    History of blood transfusion     approx 2020        History of CHF (congestive heart failure)     History of MI (myocardial infarction) 2005    thought due to a blood clot    History of ovarian cyst 1970    had oopherectomy holly    History of peritonitis 1968    due to ruptured appendix age 13    HTN (hypertension)     Hx of blood clots     right leg    Hyperlipidemia     Intestinal or peritoneal adhesions with obstruction (postoperative) (postinfection) ([address]     Kidney infection     renal failure/sepsis/spider bite    Lateral epicondylitis  of elbow     MDRO (multiple drug resistant organisms) resistance     c diff    Muscle strain     right posterior shoulder    Other abnormal glucose     PONV (postoperative nausea and vomiting)     dry heaves    Pre-diabetes     Restless legs syndrome (RLS)     Snores     no cpap    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)     TIA (transient ischemic attack) 2014    Uses walker     Vitamin D deficiency     Wears glasses     Wellness examination     last seen 2 weeks ago        Past Surgical History:     Past Surgical History:   Procedure Laterality Date    ABDOMEN SURGERY  1976    benign tumor removed near remaining ovary, 1.5 pounds    APPENDECTOMY  1968    appendix ruptured, developed peritonitis    BACK SURGERY      BUNIONECTOMY Left     along with calcium deposits removed    [address] [facility]  2005    negative    CERVICAL FUSION  05/21/2021 30 days.  11/15/22 12/15/22  Marcy Duverney, MD   pantoprazole (PROTONIX) 40 MG tablet Take 1 tablet by mouth every morning (before breakfast) 11/15/22   Marcy Duverney, MD   pramipexole (MIRAPEX) 0.5 MG tablet Take 1 tablet by mouth nightly 11/15/22   Marcy Duverney, MD   apixaban (ELIQUIS) 5 MG TABS tablet Take 1 tablet by mouth 2 times daily 10/20/22   Marcy Duverney, MD   carvedilol (COREG) 12.5 MG tablet Take 1 tablet by mouth 2 times daily 10/20/22   Marcy Duverney, MD   citalopram (CELEXA) 20 MG tablet Take 1 tablet by mouth daily 10/20/22   Marcy Duverney, MD   atorvastatin (LIPITOR) 20 MG tablet Take 1 tablet by mouth nightly 10/20/22   Marcy Duverney, MD   furosemide (LASIX) 40 MG tablet Take 1 tablet by mouth daily 10/20/22   Marcy Duverney, MD   fluticasone (FLONASE) 50 MCG/ACT nasal spray 1 spray by Each Nostril route daily 10/20/22   Marcy Duverney, MD   ferrous sulfate (IRON 325) 325 (65 Fe) MG tablet Take 325 mg by mouth daily (with breakfast)    Historical Provider, MD   vitamin D (CHOLECALCIFEROL) 25 MCG (1000 UT) TABS tablet Take 1,000 Units by mouth at bedtime    Historical Provider, MD   fenofibrate micronized (LOFIBRA) 134 MG capsule Take 1 capsule by mouth every morning (before breakfast) 5/23/22   Marcy Duverney, MD   albuterol-ipratropium (COMBIVENT RESPIMAT)  MCG/ACT AERS inhaler Inhale 1 puff into the lungs every 6 hours as needed for Wheezing or Shortness of Breath  Patient not taking: Reported on 9/6/2022 4/10/22 9/9/22  Loreatha Schwab, MD   amLODIPine (NORVASC) 5 MG tablet Take 1 tablet by mouth daily 4/10/22 7/21/22  Loreatha Schwab, MD   melatonin 3 MG TABS tablet Take 2 tablets by mouth nightly as needed (insomnia) 4/7/22   Loreatha Schwab, MD   acetaminophen (TYLENOL) 325 MG tablet Take 2 tablets by mouth every 4 hours as needed for Pain 4/7/22 9/9/22  Loreatha Schwab, MD   cyanocobalamin ([facility] VITAMIN B12) 1000 MCG tablet Take 1 tablet by mouth daily 12/3/20   Marcy Duverney, MD Lancets MISC 1 each by Does not apply route daily  Patient not taking: No sig reported 10/10/19   Heidi Kapadia MD   blood glucose monitor strips Test 2 times a day & as needed for symptoms of irregular blood glucose. Patient not taking: No sig reported 10/10/19   Heidi Kapadia MD   Calcium Carbonate-Vitamin D 500-125 MG-UNIT TABS Take 1 tablet by mouth nightly     Historical Provider, MD        Allergies:     Bactrim [sulfamethoxazole-trimethoprim], Codeine, Diazepam, Meperidine hcl, and Seasonal    Social History:     Tobacco:    reports that she quit smoking about 5 years ago. Her smoking use included cigarettes. She started smoking about 27 years ago. She has a 10.00 pack-year smoking history. She has never used smokeless tobacco.  Alcohol:      reports no history of alcohol use. Drug Use:  reports no history of drug use. Family History:     Family History   Problem Relation Age of Onset    Stroke Mother     Diabetes Mother     Heart Disease Mother     High Blood Pressure Mother     Heart Disease Father     Heart Disease Brother     High Blood Pressure Brother     Heart Disease Maternal Grandmother     High Blood Pressure Sister        Review of Systems:     Positive and Negative as described in HPI.     CONSTITUTIONAL:  negative for fevers, chills, sweats, fatigue, weight loss  HEENT:  negative for vision, hearing changes, runny nose, throat pain  RESPIRATORY:  negative for shortness of breath, cough, congestion, wheezing  CARDIOVASCULAR:  negative for chest pain, palpitations  GASTROINTESTINAL:  negative for nausea, vomiting, diarrhea, constipation, change in bowel habits, abdominal pain   GENITOURINARY:  negative for difficulty of urination, burning with urination, frequency   INTEGUMENT:  negative for rash, skin lesions, easy bruising   HEMATOLOGIC/LYMPHATIC:  negative for swelling/edema   ALLERGIC/IMMUNOLOGIC:  negative for urticaria , itching  ENDOCRINE:  negative increase in drinking, increase in urination, hot or cold intolerance  MUSCULOSKELETAL:  cellulitis/pain and swelling   NEUROLOGICAL:  negative for headaches, dizziness, lightheadedness, numbness, pain, tingling extremities  BEHAVIOR/PSYCH:  negative for depression, anxiety    Physical Exam:   BP (!) 158/77   Pulse 72   Temp 97.9 °F (36.6 °C)   Resp 18   Ht 5' 2.99\" (1.6 m)   Wt 180 lb (81.6 kg)   SpO2 93%   BMI 31.89 kg/m²   Temp (24hrs), Av.1 °F (36.7 °C), Min:97.9 °F (36.6 °C), Max:98.2 °F (36.8 °C)    No results for input(s): POCGLU in the last 72 hours.     Intake/Output Summary (Last 24 hours) at 2022 1554  Last data filed at 2022 0920  Gross per 24 hour   Intake --   Output 251 ml   Net -251 ml       General Appearance: alert, well appearing, and in no acute distress  Mental status: oriented to person, place, and time  Head: normocephalic, atraumatic  Eye: no icterus, redness, pupils equal and reactive, extraocular eye movements intact, conjunctiva clear  Ear: normal external ear, no discharge, hearing intact  Nose: no drainage noted  Mouth: mucous membranes moist  Neck: supple, no carotid bruits, thyroid not palpable  Lungs: Bilateral equal air entry, clear to ausculation, no wheezing, rales or rhonchi, normal effort  Cardiovascular: normal rate, regular rhythm, no murmur, gallop, rub  Abdomen: Soft, nontender, nondistended, normal bowel sounds, no hepatomegaly or splenomegaly  Neurologic: There are no new focal motor or sensory deficits, normal muscle tone and bulk, no abnormal sensation, normal speech, cranial nerves II through XII grossly intact  Skin: No gross lesions, rashes, bruising or bleeding on exposed skin area  Extremities: Left lower extremity redness and swelling, pulses palpable  Psych: normal affect    Investigations:      Laboratory Testing:  Recent Results (from the past 24 hour(s))   CBC with Auto Differential    Collection Time: 22  5:03 PM   Result Value Ref Range    WBC 6.6 3.5 - 11.0 k/uL RBC 3.79 (L) 4.0 - 5.2 m/uL    Hemoglobin 11.5 (L) 12.0 - 16.0 g/dL    Hematocrit 35.5 (L) 36 - 46 %    MCV 93.6 80 - 100 fL    MCH 30.3 26 - 34 pg    MCHC 32.3 31 - 37 g/dL    RDW 14.1 11.5 - 14.9 %    Platelets 014 188 - 887 k/uL    MPV 7.8 6.0 - 12.0 fL    Seg Neutrophils 51 36 - 66 %    Lymphocytes 30 24 - 44 %    Monocytes 9 (H) 1 - 7 %    Eosinophils % 9 (H) 0 - 4 %    Basophils 1 0 - 2 %    Segs Absolute 3.40 1.3 - 9.1 k/uL    Absolute Lymph # 2.00 1.0 - 4.8 k/uL    Absolute Mono # 0.60 0.1 - 1.3 k/uL    Absolute Eos # 0.60 (H) 0.0 - 0.4 k/uL    Basophils Absolute 0.10 0.0 - 0.2 k/uL   Basic Metabolic Panel w/ Reflex to MG    Collection Time: 11/25/22  5:03 PM   Result Value Ref Range    Glucose 105 (H) 70 - 99 mg/dL    BUN 26 (H) 8 - 23 mg/dL    Creatinine 1.02 (H) 0.50 - 0.90 mg/dL    Est, Glom Filt Rate 59 (L) >60 mL/min/1.73m2    Calcium 9.1 8.6 - 10.4 mg/dL    Sodium 144 135 - 144 mmol/L    Potassium 4.4 3.7 - 5.3 mmol/L    Chloride 109 (H) 98 - 107 mmol/L    CO2 26 20 - 31 mmol/L    Anion Gap 9 9 - 17 mmol/L   Basic Metabolic Panel w/ Reflex to MG    Collection Time: 11/26/22  6:52 AM   Result Value Ref Range    Glucose 107 (H) 70 - 99 mg/dL    BUN 18 8 - 23 mg/dL    Creatinine 0.83 0.50 - 0.90 mg/dL    Est, Glom Filt Rate >60 >60 mL/min/1.73m2    Calcium 8.8 8.6 - 10.4 mg/dL    Sodium 142 135 - 144 mmol/L    Potassium 4.0 3.7 - 5.3 mmol/L    Chloride 109 (H) 98 - 107 mmol/L    CO2 22 20 - 31 mmol/L    Anion Gap 11 9 - 17 mmol/L   CBC    Collection Time: 11/26/22  6:52 AM   Result Value Ref Range    WBC 6.3 3.5 - 11.0 k/uL    RBC 3.74 (L) 4.0 - 5.2 m/uL    Hemoglobin 11.4 (L) 12.0 - 16.0 g/dL    Hematocrit 35.2 (L) 36 - 46 %    MCV 94.2 80 - 100 fL    MCH 30.6 26 - 34 pg    MCHC 32.4 31 - 37 g/dL    RDW 14.5 11.5 - 14.9 %    Platelets 116 179 - 582 k/uL    MPV 7.2 6.0 - 12.0 fL   Prealbumin    Collection Time: 11/26/22  6:52 AM   Result Value Ref Range    Prealbumin 18.9 (L) 20 - 40 mg/dL Imaging/Diagnostics:  XR TIBIA FIBULA LEFT (2 VIEWS)    Result Date: 11/25/2022  No acute osseous abnormality. Osteopenia. Assessment :      Hospital Problems             Last Modified POA    * (Principal) Cellulitis 11/25/2022 Yes    Type 2 diabetes mellitus with stage 3 chronic kidney disease ([facility] 11/26/2022 Yes    Anticoagulated 11/26/2022 Yes    Stage 3 chronic kidney disease ([facility] 11/26/2022 Yes    Frequent falls 11/26/2022 Yes       Plan:     Patient status inpatient in the Med/Surge    1. Cellulitis of Left lower Extremity  -x-ray shows no acute osseous abnormality; osteopenia  -WBC 6.6  -IV Vancomycin initiated in ED  --Continue on admission to unit  --Pharmacy to dose  -Blood cultures obtained x 2  -Elevate affected limb  -IV Normal Saline @ 75 ml/hr     Generalized weakness  -Patient having frequent falls at home  --2 episodes today  --Denies prodromal dizziness and chest pain  --reports that she loses her balance and falls  -PT and OT eval and treat  -Fall precautions  -Dietitian consult for potential supplement  -Check prealbumin with am labs  -social service consulted for discharge planning  --family concerned they can no longer care for her at home     Chronic Anticoagulation  Patient anticoagulated d/t history of DVT  -Continue home dose Eliquis  -monitor for signs of bleeding   -PCP to discuss benefit vs risk of anticoagulation with hem/onc     CKD  -Patient with history of CKD Stage 3  -Creatinine: 1.02  ---Baseline: near 1.0     Diabetes Mellitus  -Not currently on diabetic medications  -POCT PRN  -Monitor glucose levels on daily BMP     Disposition 2 days           Consultations:   IP CONSULT TO INTERNAL MEDICINE  IP CONSULT TO SOCIAL WORK  PHARMACY TO DOSE VANCOMYCIN  IP CONSULT TO DIETITIAN     Patient is admitted as inpatient status because of co-morbidities listed above, severity of signs and symptoms as outlined, requirement for current medical therapies and most importantly because of direct risk to patient if care not provided in a hospital setting. Expected length of stay > 48 hours. Julienne Soulier, MD  11/26/2022  3:54 PM    Copy sent to Dr. Ronda Flores MD    Please note that this chart was generated using voice recognition Dragon dictation software. Although every effort was made to ensure the accuracy of this automated transcription, some errors in transcription may have occurred.

## 2022-11-26 NOTE — ACP (ADVANCE CARE PLANNING)
Advance Care Planning     Advance Care Planning Activator (Inpatient)  Conversation Note      Date of ACP Conversation: 11/26/2022     Conversation Conducted with: Patient with Decision Making Capacity    ACP Activator: Lorrie Banks, [address] [facility] Decision Maker:     Current Designated Health Care Decision Maker:     Primary Decision Maker: Aleyda Jeancarlos - [phone]    Secondary Decision Maker: Sapna Velasquez - Child - [phone]  Click here to complete Healthcare Decision Makers including section of the Healthcare Decision Maker Relationship (ie \"Primary\")  Today we documented Decision Maker(s) consistent with Legal Next of Kin hierarchy. Care Preferences    Ventilation: \"If you were in your present state of health and suddenly became very ill and were unable to breathe on your own, what would your preference be about the use of a ventilator (breathing machine) if it were available to you? \"      Would the patient desire the use of ventilator (breathing machine)?: yes    \"If your health worsens and it becomes clear that your chance of recovery is unlikely, what would your preference be about the use of a ventilator (breathing machine) if it were available to you? \"     Would the patient desire the use of ventilator (breathing machine)?: Yes      Resuscitation  \"CPR works best to restart the heart when there is a sudden event, like a heart attack, in someone who is otherwise healthy. Unfortunately, CPR does not typically restart the heart for people who have serious health conditions or who are very sick. \"    \"In the event your heart stopped as a result of an underlying serious health condition, would you want attempts to be made to restart your heart (answer \"yes\" for attempt to resuscitate) or would you prefer a natural death (answer \"no\" for do not attempt to resuscitate)? \" yes       [] Yes   [] No   Educated Patient / Bennie Eckert regarding differences between Advance Directives and portable

## 2022-11-26 NOTE — PROGRESS NOTES
Comprehensive Nutrition Assessment    Type and Reason for Visit:  Consult    Nutrition Recommendations/Plan:   Continue current diet  Start Glucerna with meals     Malnutrition Assessment:  Malnutrition Status:  No malnutrition (11/26/22 1112)    Context:  Acute Illness     Findings of the 6 clinical characteristics of malnutrition:  Energy Intake:  Mild decrease in energy intake (Comment)  Weight Loss:  No significant weight loss     Body Fat Loss:  No significant body fat loss     Muscle Mass Loss:  No significant muscle mass loss    Fluid Accumulation:  Mild Extremities   Strength:  Not Performed    Nutrition Assessment:    Patient admitted due to cellulitis. Consult for supplements due to generalized weakness. Will order Glucerna to better meet protein-energy needs. Nutrition Related Findings:    Edema: RLE +1; LLE +2 Wound Type: None       Current Nutrition Intake & Therapies:    Average Meal Intake: 26-50%     ADULT DIET; Regular; 4 carb choices (60 gm/meal)  ADULT ORAL NUTRITION SUPPLEMENT; Breakfast, Lunch, Dinner; Diabetic Oral Supplement    Anthropometric Measures:  Height: 5' 2.99\" (160 cm)  Ideal Body Weight (IBW): 115 lbs (52 kg)    Admission Body Weight: 179 lb 14.3 oz (81.6 kg)  Current Body Weight:  ,   IBW. Weight Source: Stated  Current BMI (kg/m2): BMI Categories: Obese Class 1 (BMI 30.0-34. 9)    Estimated Daily Nutrient Needs:  Energy Requirements Based On: Kcal/kg  Weight Used for Energy Requirements: Current  Energy (kcal/day): 1475 kcal/d  Weight Used for Protein Requirements: Ideal  Protein (g/day): 65g per day         Nutrition Diagnosis:   Inadequate protein-energy intake related to inadequate protein-energy intake as evidenced by intake 26-50%    Nutrition Interventions:   Food and/or Nutrient Delivery: Continue Current Diet, Start Oral Nutrition Supplement  Nutrition Education/Counseling: Education not indicated  Coordination of Nutrition Care: Continue to monitor while inpatient       Goals:     Goals: Meet at least 75% of estimated needs       Nutrition Monitoring and Evaluation:   Behavioral-Environmental Outcomes: None Identified  Food/Nutrient Intake Outcomes: Food and Nutrient Intake, Supplement Intake  Physical Signs/Symptoms Outcomes: Biochemical Data, Fluid Status or Edema, Weight, Skin, Nutrition Focused Physical Findings    Discharge Planning: Too soon to determine     Rachelle Kennedy RD, LD  [phone]    Some areas of assessment may be incomplete due to standard COVID-19 Precautions.

## 2022-11-26 NOTE — PROGRESS NOTES
Candace Adamson is a 71 y.o. Non- / non  female who presents with Cellulitis and Fall   and is admitted to the hospital for the management of Cellulitis. According to patient, she was ED today after being seen by her PCP d/t concerns of cellulitis in left lower extremity  Patient has a history of left lower extremity cellulitis and presented with erythema and edema of left lower extremity. Additionally, there is a 1 cm scabbed laceration on her medial left shin from a fall that occurred approximately 1 week ago. (See image in media tab). Symptoms are associated with generalized weakness and frequent falls. Patient reported having 2 falls today. Family voiced concerns to PCP about inability to provide adequate care at home. Patient denies fever, chills, chest pain, cough, abdominal pain, nausea, vomiting, diarrhea, and urinary symptoms. There are no aggravating or alleviating factors. Symptoms are reported as constant and moderate.         Patient status inpatient in the Med/Surge    Cellulitis of Left lower Extremity  -x-ray shows no acute osseous abnormality; osteopenia  -WBC 6.6  -IV Vancomycin initiated in ED  --Continue on admission to unit  --Pharmacy to dose  -Blood cultures obtained x 2  -Elevate affected limb  -IV Normal Saline @ 75 ml/hr    Generalized weakness  -Patient having frequent falls at home  --2 episodes today  --Denies prodromal dizziness and chest pain  --reports that she loses her balance and falls  -PT and OT eval and treat  -Fall precautions  -Dietitian consult for potential supplement  -Check prealbumin with am labs  -social service consulted for discharge planning  --family concerned they can no longer care for her at home    Chronic Anticoagulation  Patient anticoagulated d/t history of DVT  -Continue home dose Eliquis  -monitor for signs of bleeding   -PCP to discuss benefit vs risk of anticoagulation with hem/onc    CKD  -Patient with history of CKD Stage 3  -Creatinine: 1.02  ---Baseline: near 1.0    Diabetes Mellitus  -Not currently on diabetic medications  -POCT PRN  -Monitor glucose levels on daily BMP    Disposition 2 days      Consultations:   IP CONSULT TO INTERNAL MEDICINE    Patient is admitted as inpatient status because of co-morbidities listed above, severity of signs and symptoms as outlined, requirement for current medical therapies and most importantly because of direct risk to patient if care not provided in a hospital setting. Expected length of stay > 48 hours.     MAIK Pop - CNP  11/25/2022  8:30 PM

## 2022-11-26 NOTE — PROGRESS NOTES
Pt showed her tears during the prayer as writer prayed for her  as well. Pt said \"I will beat this,\" writer held hand for encouragement. 11/26/22 1434   Encounter Summary   Encounter Overview/Reason  Spiritual/Emotional Needs   Service Provided For: Patient   Referral/Consult From: [facility]   Support System Spouse   Last Encounter  11/26/22   Complexity of Encounter Moderate   Spiritual/Emotional needs   Type Spiritual Support   Assessment/Intervention/Outcome   Assessment Calm   Intervention Active listening;Prayer (assurance of)/[place];Sustaining Presence/Ministry of presence; Discussed illness injury and its impact   Outcome Coping;Engaged in conversation;Receptive; Expressed feelings, needs, and concerns;Venting emotion

## 2022-11-27 VITALS
HEIGHT: 63 IN | HEART RATE: 62 BPM | WEIGHT: 180 LBS | SYSTOLIC BLOOD PRESSURE: 151 MMHG | OXYGEN SATURATION: 96 % | RESPIRATION RATE: 16 BRPM | DIASTOLIC BLOOD PRESSURE: 77 MMHG | TEMPERATURE: 98.1 F | BODY MASS INDEX: 31.89 KG/M2

## 2022-11-27 LAB
ANION GAP SERPL CALCULATED.3IONS-SCNC: 9 MMOL/L (ref 9–17)
BUN BLDV-MCNC: 18 MG/DL (ref 8–23)
CALCIUM SERPL-MCNC: 8.8 MG/DL (ref 8.6–10.4)
CHLORIDE BLD-SCNC: 104 MMOL/L (ref 98–107)
CO2: 26 MMOL/L (ref 20–31)
CREAT SERPL-MCNC: 0.89 MG/DL (ref 0.5–0.9)
GFR SERPL CREATININE-BSD FRML MDRD: >60 ML/MIN/1.73M2
GLUCOSE BLD-MCNC: 104 MG/DL (ref 70–99)
HCT VFR BLD CALC: 34.6 % (ref 36–46)
HEMOGLOBIN: 11.6 G/DL (ref 12–16)
MCH RBC QN AUTO: 30.6 PG (ref 26–34)
MCHC RBC AUTO-ENTMCNC: 33.5 G/DL (ref 31–37)
MCV RBC AUTO: 91.4 FL (ref 80–100)
PDW BLD-RTO: 13.8 % (ref 11.5–14.9)
PLATELET # BLD: 264 K/UL (ref 150–450)
PMV BLD AUTO: 6.8 FL (ref 6–12)
POTASSIUM SERPL-SCNC: 3.7 MMOL/L (ref 3.7–5.3)
RBC # BLD: 3.78 M/UL (ref 4–5.2)
SODIUM BLD-SCNC: 139 MMOL/L (ref 135–144)
VANCOMYCIN RANDOM DATE LAST DOSE: NORMAL
VANCOMYCIN RANDOM DOSE AMOUNT: NORMAL
VANCOMYCIN RANDOM TIME LAST DOSE: 2105
VANCOMYCIN RANDOM: 20.8 UG/ML
WBC # BLD: 4.9 K/UL (ref 3.5–11)

## 2022-11-27 PROCEDURE — 80202 ASSAY OF VANCOMYCIN: CPT

## 2022-11-27 PROCEDURE — 85027 COMPLETE CBC AUTOMATED: CPT

## 2022-11-27 PROCEDURE — 80048 BASIC METABOLIC PNL TOTAL CA: CPT

## 2022-11-27 PROCEDURE — 36415 COLL VENOUS BLD VENIPUNCTURE: CPT

## 2022-11-27 PROCEDURE — 6370000000 HC RX 637 (ALT 250 FOR IP): Performed by: NURSE PRACTITIONER

## 2022-11-27 RX ORDER — DOXYCYCLINE HYCLATE 100 MG
100 TABLET ORAL 2 TIMES DAILY
Qty: 14 TABLET | Refills: 0 | Status: SHIPPED | OUTPATIENT
Start: 2022-11-27 | End: 2022-12-04

## 2022-11-27 RX ORDER — GABAPENTIN 100 MG/1
100 CAPSULE ORAL 2 TIMES DAILY
Qty: 30 CAPSULE | Refills: 0 | Status: SHIPPED | OUTPATIENT
Start: 2022-11-27 | End: 2022-12-27

## 2022-11-27 RX ADMIN — FENOFIBRATE 160 MG: 160 TABLET ORAL at 07:57

## 2022-11-27 RX ADMIN — PANTOPRAZOLE SODIUM 40 MG: 40 TABLET, DELAYED RELEASE ORAL at 06:26

## 2022-11-27 RX ADMIN — CITALOPRAM HYDROBROMIDE 20 MG: 20 TABLET ORAL at 07:58

## 2022-11-27 RX ADMIN — GABAPENTIN 100 MG: 100 CAPSULE ORAL at 07:57

## 2022-11-27 RX ADMIN — CARVEDILOL 12.5 MG: 12.5 TABLET, FILM COATED ORAL at 07:58

## 2022-11-27 RX ADMIN — BUSPIRONE HYDROCHLORIDE 5 MG: 5 TABLET ORAL at 14:28

## 2022-11-27 RX ADMIN — BUSPIRONE HYDROCHLORIDE 5 MG: 5 TABLET ORAL at 07:57

## 2022-11-27 RX ADMIN — APIXABAN 5 MG: 5 TABLET, FILM COATED ORAL at 07:58

## 2022-11-27 RX ADMIN — FUROSEMIDE 40 MG: 40 TABLET ORAL at 07:57

## 2022-11-27 ASSESSMENT — PAIN SCALES - GENERAL: PAINLEVEL_OUTOF10: 0

## 2022-11-27 NOTE — DISCHARGE INSTR - COC
Continuity of Care Form    Patient Name: Kathryn Martin   :  1950  MRN:  [id number]    Admit date:  2022  Discharge date:  22    Code Status Order: Full Code   Advance Directives:     Admitting Physician:  Mike Suresh MD  PCP: Mae De La Garza MD    Discharging Nurse: [facility] Unit/Room#: 2064/2064-01  Discharging Unit Phone Number: [phone]    Emergency Contact:   Extended Emergency Contact Information  Primary Emergency Contact: Chichi Encinas  Address: [address] [place] [address] [address], [address] [place] [address] Phone: [phone]  Mobile Phone: [phone]  Relation: Spouse  Hearing or visual needs: None  Other needs: None  Preferred language: [place]   needed?  No  Secondary Emergency Contact: Courtney Perea  Home Phone: [phone]  Mobile Phone: [phone]  Relation: Child    Past Surgical History:  Past Surgical History:   Procedure Laterality Date    ABDOMEN SURGERY      benign tumor removed near remaining ovary, 1.5 pounds    APPENDECTOMY      appendix ruptured, developed peritonitis    BACK SURGERY      BUNIONECTOMY Left     along with calcium deposits removed    [address] [facility] [address]  2005    negative    CERVICAL FUSION  2021    POSTERIOR C3-6 LAMINECTOMY, PARTIAL C7 LAMINECTOMY, FUSION C3-C6, SILVERCORD    CERVICAL FUSION N/A 2021    POSTERIOR C3-6 LAMINECTOMY, PARTIAL C7 LAMINECTOMY, FUSION C3-C6, SILVERCORD performed by Laura Nolan DO at [address] [place] [address]    12 INCHES REMOVED D/T OBSTRUCTION    COLONOSCOPY      CYST REMOVAL Right     right facial    FINGER CLOSED REDUCTION Left 2022    LEFT LITTLE FINGER CLOSED REDUCTION PINNING (Left: Little Finger)    FINGER CLOSED REDUCTION Left 2022    CLOSED REDUCTION PINNING LEFT LITTLE FINGER performed by Amor Zhu MD at . [facility] ([address] [place] [address])      taken as a result of recurring cysts    LUMBAR FUSION N/A 02/10/2020    LUMBAR L4-5 POSTERIOR  DECOMPRESSION INSTRUMENTATION FUSION WCEMENT AUGMENTATION/ performed by Caro Spencer MD at [facility] N/A 06/17/2020    L5-S1 PLIF L4-L5 REVISION performed by Caro Spencer MD at [address]  08/14/2014    FESS    OVARY REMOVAL  1970    UNILATERAL due to cyst    OVARY REMOVAL  1971    partial, due to cyst    SINUS SURGERY  2004    UPPER GASTROINTESTINAL ENDOSCOPY N/A 05/31/2019    EGD ESOPHAGOGASTRODUODENOSCOPY performed by Regina Guajardo MD at [address] [facility] N/A 08/05/2019    EGD BIOPSY performed by Meghan Sherwood MD at [address] [facility] N/A 08/23/2019    EGD BIOPSY performed by Regina Guajardo MD at [address] Left 03/05/2019    WRIST OPEN REDUCTION INTERNAL FIXATION performed by Maryjane Brink MD at [facility] OR       Immunization History:   Immunization History   Administered Date(s) Administered    COVID-19, PFIZER PURPLE top, DILUTE for use, (age 15 y+), 30mcg/0.3mL 03/18/2021, 04/08/2021    Influenza, High Dose (Fluzone 65 yrs and older) 11/17/2017    PPD Test 03/25/2020, 04/04/2020    Pneumococcal Conjugate 13-valent (Xseioqt32) 05/23/2017    Pneumococcal Polysaccharide ([id number]) 05/10/2016       Active Problems:  Patient Active Problem List   Diagnosis Code    Other specified disorders of rotator cuff syndrome of shoulder and allied disorders [id number]    Diverticulosis of large intestine K57.30    Intestinal or peritoneal adhesions with obstruction (postoperative) (postinfection) (HCC) K56.50    Restless legs syndrome (RLS) G25.81    GERD (gastroesophageal reflux disease) K21.9    Essential hypertension I10    Mixed hyperlipidemia E78.2    Other abnormal glucose R73.09    Atherosclerosis I70.90    Allergic rhinitis J30.9    Vitamin D deficiency E55.9    Depression F32. A Degenerative joint disease (DJD) of hip M16.9    Peripheral edema R60.9    Injury of foot, left J67.263E    Facial droop R29.810    CVA (cerebral vascular accident) ([facility] [address] I63.9    Bradycardia R00.1    Ataxia R27.0    Aphasia R47.01    TIA (transient ischemic attack) G45.9    Need for prophylactic vaccination and inoculation against cholera alone Z23    Osteopenia M85.80    Lumbago M54.50    Meralgia paresthetica of right side G57.11    Lumbar degenerative disc disease M51.36    Spondylosis of lumbar region without myelopathy or radiculopathy M47.816    Blood poisoning [id number]    Cellulitis of left lower extremity L03.116    Encounter for medication monitoring Z51.81    Deep vein thrombosis (DVT) of right lower extremity (HCC) I82.401    Chronic deep vein thrombosis (DVT) of proximal vein of both lower extremities (HCC) I82.5Y3    Chronic diastolic heart failure (HCC) I50.32    Neurogenic claudication due to lumbar spinal stenosis M48.062    Sacroiliitis (HCC) M46.1    Stage 3 chronic kidney disease (HCC) N18.30    Type 2 diabetes mellitus with circulatory disorder, without long-term current use of insulin (HCC) E11.59    Chronic deep vein thrombosis (DVT) of popliteal vein of right lower extremity (HCC) I82.531    Closed fracture of left wrist S62.102A    B12 deficiency E53.8    Iron deficiency anemia secondary to inadequate dietary iron intake D50.8    Closed head injury S09.90XA    Scalp laceration S01. 01XA    Abnormal finding on imaging R93.89    Other irritable bowel syndrome K58.8    Frequent falls R29.6    Double vision H53.2    Muscle soreness M79.10    Peptic ulcer K27.9    Melena K92.1    PUD (peptic ulcer disease) K27.9    Absolute anemia D64.9    Acute blood loss anemia D62    Acute renal failure (HCC) N17.9    Clostridium difficile infection A49.8    Acquired spondylolisthesis M43.10    Spinal stenosis of lumbar region with neurogenic claudication M48.062    Acute deep vein thrombosis (DVT) of proximal vein of left lower extremity ([facility]) I82.4Y2    Leg swelling M79.89    Back pain M54.9    Neurological disorder G98.8    Closed unstable burst fracture of fifth lumbar vertebra with routine healing S32.052D    Slow transit constipation K59.01    Major depressive disorder, recurrent, moderate ([facility]) F33.1    Acute deep vein thrombosis (DVT) of femoral vein of left lower extremity ([facility]) I82.412    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[facility]) 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, G99.2    Acute deep vein thrombosis (DVT) of right femoral vein ([facility]) I82.411    S/P cervical spinal fusion Z98.1    Gait instability R26.81    Cervical myelopathy ([facility]) G95.9    Cellulitis of both lower extremities L03.115, L03.116    Fracture of body of sternum, initial encounter for open fracture S22.22XB    Nondisplaced fracture of sternal end of left clavicle, initial encounter for closed fracture S42.018A    Erysipelas of right lower extremity A46    Closed fracture of body of sternum S22.22XA    Calculus of gallbladder without cholecystitis without obstruction K80.20    Leukocytosis D72.829    Type 2 diabetes mellitus with stage 3 chronic kidney disease ([facility]) E11.22, N18.30    Allergy to sulfa drugs Z88.2    Bilateral cellulitis of lower leg L03.116, L03.115    Lymphedema of both lower extremities I89.0    Cerebral infarction, unspecified ([facility] [address] I63.9    Chronic embolism and thrombosis of unspecified deep veins of proximal lower extremity, bilateral ([facility]) I82.5Y3    Other specified disorders of bone density and structure, unspecified site M85.80    Repeated falls R29.6    Fall as cause of accidental injury in home as place of occurrence, initial encounter W19. XXXA, Y92.009    Cellulitis L03.90    Anticoagulated Z79.01       Isolation/Infection:   Isolation            No Isolation          Patient Infection Status       Infection Onset Added Last Indicated Last Indicated By Review Planned Expiration Resolved Resolved By    None active    Resolved    COVID-19 (Rule Out) 05/17/21 05/17/21 05/17/21 COVID-19 (Ordered)   05/18/21 Rule-Out Test Resulted    COVID-19 (Rule Out) 04/12/21 04/13/21 04/12/21 COVID-19 (Ordered)   04/14/21 Rule-Out Test Resulted    COVID-19 04/25/20 04/25/20 04/25/20 COVID-19   06/18/20 Es Parent, RN    COVID-19 (Rule Out) 04/25/20 04/25/20 04/25/20 COVID-19 (Ordered)   04/25/20 Rule-Out Test Resulted            Nurse Assessment:  Last Vital Signs: BP (!) 151/77   Pulse 62   Temp 98.1 °F (36.7 °C) (Oral)   Resp 16   Ht 5' 2.99\" (1.6 m)   Wt 180 lb (81.6 kg)   SpO2 96%   BMI 31.89 kg/m²     Last documented pain score (0-10 scale): Pain Level: 0  Last Weight:   Wt Readings from Last 1 Encounters:   11/25/22 180 lb (81.6 kg)     Mental Status:  oriented    IV Access:  - None    Nursing Mobility/ADLs:  Walking   Assisted  Transfer  Assisted  Bathing  Assisted  Dressing  Assisted  Toileting  Assisted  Feeding  Independent  Med Admin  Independent  Med Delivery   whole    Wound Care Documentation and Therapy:  Incision 08/24/22 Finger (Comment which one) Left;Dorsal (Active)   Number of days: 95        Elimination:  Continence: Bowel: Yes  Bladder: Yes  Urinary Catheter: None   Colostomy/Ileostomy/Ileal Conduit: No       Date of Last BM: 11/27    Intake/Output Summary (Last 24 hours) at 11/27/2022 1129  Last data filed at 11/26/2022 1748  Gross per 24 hour   Intake 825 ml   Output --   Net 825 ml     I/O last 3 completed shifts:   In: 588 [I.V.:825]  Out: 251 [Urine:250; Stool:1]    Safety Concerns:     History of Falls (last 30 days) and At Risk for Falls    Impairments/Disabilities:      None    Nutrition Therapy:  Current Nutrition Therapy:   - Oral Diet:  General    Routes of Feeding: Oral  Liquids: No Restrictions  Daily Fluid Restriction: no  Last Modified Barium Swallow with Video (Video Swallowing Test): not done    Treatments at the Time of Hospital Discharge:   Respiratory Treatments: SEE MAR  Oxygen Therapy:  is not on home oxygen therapy. Ventilator:    - No ventilator support    Rehab Therapies: Physical Therapy and Occupational Therapy  Weight Bearing Status/Restrictions: No weight bearing restrictions  Other Medical Equipment (for information only, NOT a DME order):  walker  Other Treatments: skilled nursing assessment, medication education, and continued monitoring. Patient's personal belongings (please select all that are sent with patient):  Glasses    RN SIGNATURE:  Electronically signed by Keon Machuca RN on 11/27/22 at 11:35 AM EST    CASE MANAGEMENT/SOCIAL WORK SECTION    Inpatient Status Date: 11/25/22    Readmission Risk Assessment Score:  Readmission Risk              Risk of Unplanned Readmission:  28           Discharging to Facility/ [address] [facility] [address] [place] [address] [place] , [facility]  P: [phone]  F: [phone]     Dialysis Facility (if applicable)   Name:  Address:  Dialysis Schedule:  Phone:  Fax:    / signature: Electronically signed by Alessia Gould RN on 11/27/22 at 11:39 AM EST    PHYSICIAN SECTION    Prognosis: Fair    Condition at Discharge: Stable    Rehab Potential (if transferring to Rehab): Good    Recommended Labs or Other Treatments After Discharge:     Physician Certification: I certify the above information and transfer of Jacki Sloan  is necessary for the continuing treatment of the diagnosis listed and that she requires [facility] for greater 30 days.      Update Admission H&P: No change in H&P    PHYSICIAN SIGNATURE:  Electronically signed by Julienne Soulier, MD on 11/27/22 at 11:29 AM EST

## 2022-11-27 NOTE — PROGRESS NOTES
Vancomycin Dosing by Pharmacy - Daily Note   Vancomycin Therapy Day:  3  Indication: SSTI    Allergies:  Bactrim [sulfamethoxazole-trimethoprim], Codeine, Diazepam, Meperidine hcl, and Seasonal   Actual Weight:    Wt Readings from Last 1 Encounters:   11/25/22 180 lb (81.6 kg)       Labs/Ancillary Data  Estimated Creatinine Clearance: 59 mL/min (based on SCr of 0.89 mg/dL). Recent Labs     11/25/22  1703 11/26/22  0652 11/27/22  0611   CREATININE 1.02* 0.83 0.89   BUN 26* 18 18   WBC 6.6 6.3 4.9     No results found for: PROCAL    Intake/Output Summary (Last 24 hours) at 11/27/2022 0644  Last data filed at 11/26/2022 1748  Gross per 24 hour   Intake 825 ml   Output 1 ml   Net 824 ml     Temp: 98.1    Culture Date / Source  /  Results  11/25              Blood x2     Pending  Recent vancomycin administrations                     vancomycin (VANCOCIN) 1,250 mg in dextrose 5 % 250 mL IVPB (ADDAVIAL) (mg) 1,250 mg New Bag 11/26/22 2105    vancomycin (VANCOCIN) 2,000 mg in dextrose 5 % 500 mL IVPB (mg) 2,000 mg New Bag 11/25/22 1750                    Vancomycin Concentrations:   TROUGH:  No results for input(s): VANCOTROUGH in the last 72 hours. RANDOM:    Recent Labs     11/27/22  0611   VANCORANDOM 20.8       MRSA Nasal Swab: N/A. Non-respiratory infection. Olga Lidia Sherwood PLAN     Continue current dose of 1250 mg q24h IV  Ensured BUN/sCr ordered at baseline and every 48 hours x at least 3 levels, then at least weekly.   Pharmacy will continue to monitor patient and adjust therapy as indicated      Vancomycin Target Concentration Parameters  Treatment  Population Target AUC/JOJO Target Trough   Invasive MRSA Infection (bacteremia, pneumonia, meningitis, endocarditis, osteomyelitis)  Sepsis (undifferentiated) 400-600 N/A   Infection due to non-MRSA pathogen  Empiric treatment of non-invasive MRSA infection  (SSTI, UTI) <500 10-15 mg/L   CrCl < 29 mL/min  Rapidly fluctuating serum creatinine   BIN N/A < 15 mg/L     Renal replacement therapy is dosed by levels, per hospital protocol. Abbreviations  * Pauc: probability that AUC is >400 (efficacy); Pconc: probability that Ctrough is above 20 ?g/mL (toxicity); Tox: Probability of nephrotoxicity, based on Lodmaureen et al. Clin Infect Dis 2009. Loading dose: 2000 mg at 17:50 11/25/2022. Regimen: 1250 mg IV every 24 hours. Start time: 21:05 on 11/27/2022  Exposure target: AUC24 (range)400-500 mg/L.hr   AUC24,ss: 477 mg/L.hr  Probability of AUC24 > 400: 83 %  Ctrough,ss: 13.5 mg/L  Probability of Ctrough,ss > 20: 7 %  Probability of nephrotoxicity (Monse VERONICA 2009): 9 %    Thank you for the consult. Pharmacy will continue to follow.    [address], [place]   11/27/2022  6:46 AM

## 2022-11-27 NOTE — PROGRESS NOTES
Writer called report to RN at [facility]. Updated on plan of care. Answered all questions.   will transport patient at 3pm.

## 2022-11-27 NOTE — PLAN OF CARE
Problem: Discharge Planning  Goal: Discharge to home or other facility with appropriate resources  11/27/2022 1603 by Elton Krishnan RN  Outcome: Completed     Problem: Chronic Conditions and Co-morbidities  Goal: Patient's chronic conditions and co-morbidity symptoms are monitored and maintained or improved  11/27/2022 1603 by Elton Krishnan RN  Outcome: Completed     Problem: Skin/Tissue Integrity  Goal: Absence of new skin breakdown  Description: 1. Monitor for areas of redness and/or skin breakdown  2. Assess vascular access sites hourly  3. Every 4-6 hours minimum:  Change oxygen saturation probe site  4. Every 4-6 hours:  If on nasal continuous positive airway pressure, respiratory therapy assess nares and determine need for appliance change or resting period.   11/27/2022 1603 by Elton Krishnan RN  Outcome: Completed     Problem: Pain  Goal: Verbalizes/displays adequate comfort level or baseline comfort level  11/27/2022 1603 by Elton Krishnan RN  Outcome: Completed     Problem: Safety - Adult  Goal: Free from fall injury  11/27/2022 1603 by Elton Krishnan RN  Outcome: Completed     Problem: ABCDS Injury Assessment  Goal: Absence of physical injury  11/27/2022 1603 by Elton Krishnan RN  Outcome: Completed

## 2022-11-27 NOTE — PLAN OF CARE
Problem: Discharge Planning  Goal: Discharge to home or other facility with appropriate resources  Outcome: Progressing  Flowsheets (Taken 11/26/2022 2030)  Discharge to home or other facility with appropriate resources:   Identify barriers to discharge with patient and caregiver   Arrange for needed discharge resources and transportation as appropriate   Identify discharge learning needs (meds, wound care, etc)     Problem: Chronic Conditions and Co-morbidities  Goal: Patient's chronic conditions and co-morbidity symptoms are monitored and maintained or improved  Outcome: Progressing  Flowsheets (Taken 11/26/2022 2030)  Care Plan - Patient's Chronic Conditions and Co-Morbidity Symptoms are Monitored and Maintained or Improved:   Monitor and assess patient's chronic conditions and comorbid symptoms for stability, deterioration, or improvement   Collaborate with multidisciplinary team to address chronic and comorbid conditions and prevent exacerbation or deterioration   Update acute care plan with appropriate goals if chronic or comorbid symptoms are exacerbated and prevent overall improvement and discharge     Problem: Skin/Tissue Integrity  Goal: Absence of new skin breakdown  Description: 1. Monitor for areas of redness and/or skin breakdown  2. Assess vascular access sites hourly  3. Every 4-6 hours minimum:  Change oxygen saturation probe site  4. Every 4-6 hours:  If on nasal continuous positive airway pressure, respiratory therapy assess nares and determine need for appliance change or resting period.   Outcome: Progressing     Problem: Pain  Goal: Verbalizes/displays adequate comfort level or baseline comfort level  Outcome: Progressing     Problem: Safety - Adult  Goal: Free from fall injury  Outcome: Progressing  Flowsheets (Taken 11/26/2022 2030)  Free From Fall Injury: Instruct family/caregiver on patient safety     Problem: ABCDS Injury Assessment  Goal: Absence of physical injury  Outcome: Progressing  Flowsheets (Taken 11/26/2022 2030)  Absence of Physical Injury: Implement safety measures based on patient assessment

## 2022-11-27 NOTE — DISCHARGE INSTR - DIET

## 2022-11-27 NOTE — CARE COORDINATION
DISCHARGE PLANNING NOTE    Call from [facility]. They can accept the patient at anytime she is medically ready. Electronically signed by Matthew Gallegos RN on 11/27/2022 at 9:34 AM     Dr Steven Epstein said patient can be discharged to [facility] today. Spoke to [facility] and patient admit around 3-4 pm this afternoon. Patient stated she would have transportation. Her  will pick her up and transport.   Electronically signed by Matthew Gallegos RN on 11/27/2022 at 11:39 AM

## 2022-11-29 ENCOUNTER — HOSPITAL ENCOUNTER (OUTPATIENT)
Age: 72
Setting detail: SPECIMEN
Discharge: HOME | End: 2022-11-29

## 2022-11-29 ENCOUNTER — CARE COORDINATION (OUTPATIENT)
Dept: CARE COORDINATION | Age: 72
End: 2022-11-29

## 2022-11-29 LAB
ABSOLUTE EOS #: 0.58 K/UL (ref 0–0.44)
ABSOLUTE IMMATURE GRANULOCYTE: 0.02 K/UL (ref 0–0.3)
ABSOLUTE LYMPH #: 1.96 K/UL (ref 1.1–3.7)
ABSOLUTE MONO #: 0.48 K/UL (ref 0.1–1.2)
ALBUMIN SERPL-MCNC: 3.6 G/DL (ref 3.5–5.2)
ALP BLD-CCNC: 42 U/L (ref 35–104)
ALT SERPL-CCNC: 15 U/L (ref 5–33)
ANION GAP SERPL CALCULATED.3IONS-SCNC: 14 MMOL/L (ref 9–17)
AST SERPL-CCNC: 21 U/L
BASOPHILS # BLD: 1 % (ref 0–2)
BASOPHILS ABSOLUTE: 0.06 K/UL (ref 0–0.2)
BILIRUB SERPL-MCNC: 0.3 MG/DL (ref 0.3–1.2)
BUN BLDV-MCNC: 32 MG/DL (ref 8–23)
BUN/CREAT BLD: 29 (ref 9–20)
CALCIUM SERPL-MCNC: 8.9 MG/DL (ref 8.6–10.4)
CHLORIDE BLD-SCNC: 104 MMOL/L (ref 98–107)
CO2: 27 MMOL/L (ref 20–31)
CREAT SERPL-MCNC: 1.11 MG/DL (ref 0.5–0.9)
EOSINOPHILS RELATIVE PERCENT: 11 % (ref 1–4)
GFR SERPL CREATININE-BSD FRML MDRD: 53 ML/MIN/1.73M2
GLUCOSE BLD-MCNC: 89 MG/DL (ref 70–99)
HCT VFR BLD CALC: 37.6 % (ref 36.3–47.1)
HEMOGLOBIN: 11.4 G/DL (ref 11.9–15.1)
IMMATURE GRANULOCYTES: 0 %
LYMPHOCYTES # BLD: 36 % (ref 24–43)
MCH RBC QN AUTO: 29.7 PG (ref 25.2–33.5)
MCHC RBC AUTO-ENTMCNC: 30.3 G/DL (ref 28.4–34.8)
MCV RBC AUTO: 97.9 FL (ref 82.6–102.9)
MONOCYTES # BLD: 9 % (ref 3–12)
NRBC AUTOMATED: 0 PER 100 WBC
PDW BLD-RTO: 13.5 % (ref 11.8–14.4)
PLATELET # BLD: 280 K/UL (ref 138–453)
PMV BLD AUTO: 9.7 FL (ref 8.1–13.5)
POTASSIUM SERPL-SCNC: 3.8 MMOL/L (ref 3.7–5.3)
RBC # BLD: 3.84 M/UL (ref 3.95–5.11)
SEG NEUTROPHILS: 43 % (ref 36–65)
SEGMENTED NEUTROPHILS ABSOLUTE COUNT: 2.38 K/UL (ref 1.5–8.1)
SODIUM BLD-SCNC: 145 MMOL/L (ref 135–144)
TOTAL PROTEIN: 6.3 G/DL (ref 6.4–8.3)
WBC # BLD: 5.5 K/UL (ref 3.5–11.3)

## 2022-11-29 PROCEDURE — P9603 ONE-WAY ALLOW PRORATED MILES: HCPCS

## 2022-11-29 PROCEDURE — 80053 COMPREHEN METABOLIC PANEL: CPT

## 2022-11-29 PROCEDURE — 36415 COLL VENOUS BLD VENIPUNCTURE: CPT

## 2022-11-29 PROCEDURE — 85025 COMPLETE CBC W/AUTO DIFF WBC: CPT

## 2022-11-30 NOTE — PROGRESS NOTES
Physician Progress Note      PATIENT:               Kanchan Thomas  CSN #:                  [id number]  :                       1950  ADMIT DATE:       2022 3:34 PM  Jeremy Casas Rincon DATE:        2022 4:07 PM  RESPONDING  PROVIDER #:        Melody Boateng MD          QUERY TEXT:    Patient admitted with cellulitis. Noted documentation of DM in H & P on . In order to support the diagnosis of diabetes mellitus, please include   additional clinical indicators in your documentation or provide further   clarity regarding the documented diagnosis: The medical record reflects the following:  Risk Factors: history of DM & family hx of DM  Clinical Indicators: Blood glucose  during admission, only checked with   daily labs; HgbAIC 5.1 on 22. Treatment: blood glucose checked only with daily labs and no medications for   control of blood sugar, carbohydrate controlled diet  Options provided:  -- Diabetes mellitus present as evidenced by, Please document evidence. -- No clinical evidence of Diabetes mellitus currently  -- Other - I will add my own diagnosis  -- Disagree - Not applicable / Not valid  -- Disagree - Clinically unable to determine / Unknown  -- Refer to Clinical Documentation Reviewer    PROVIDER RESPONSE TEXT:    Patient does not have clinical evidence of Diabetes mellitus currently.     Query created by: Billie Ndiaye on 2022 9:52 AM      Electronically signed by:  Melody Boateng MD 2022 10:17 AM

## 2022-12-06 ENCOUNTER — HOSPITAL ENCOUNTER (OUTPATIENT)
Age: 72
Setting detail: SPECIMEN
Discharge: HOME | End: 2022-12-06

## 2022-12-06 LAB
ABSOLUTE EOS #: 0.49 K/UL (ref 0–0.44)
ABSOLUTE IMMATURE GRANULOCYTE: 0.02 K/UL (ref 0–0.3)
ABSOLUTE LYMPH #: 2.22 K/UL (ref 1.1–3.7)
ABSOLUTE MONO #: 0.5 K/UL (ref 0.1–1.2)
ALBUMIN SERPL-MCNC: 3.7 G/DL (ref 3.5–5.2)
ALP BLD-CCNC: 41 U/L (ref 35–104)
ALT SERPL-CCNC: 11 U/L (ref 5–33)
ANION GAP SERPL CALCULATED.3IONS-SCNC: 12 MMOL/L (ref 9–17)
AST SERPL-CCNC: 15 U/L
BASOPHILS # BLD: 1 % (ref 0–2)
BASOPHILS ABSOLUTE: 0.06 K/UL (ref 0–0.2)
BILIRUB SERPL-MCNC: 0.4 MG/DL (ref 0.3–1.2)
BUN BLDV-MCNC: 57 MG/DL (ref 8–23)
BUN/CREAT BLD: 41 (ref 9–20)
CALCIUM SERPL-MCNC: 9.3 MG/DL (ref 8.6–10.4)
CHLORIDE BLD-SCNC: 103 MMOL/L (ref 98–107)
CO2: 28 MMOL/L (ref 20–31)
CREAT SERPL-MCNC: 1.38 MG/DL (ref 0.5–0.9)
EOSINOPHILS RELATIVE PERCENT: 9 % (ref 1–4)
GFR SERPL CREATININE-BSD FRML MDRD: 41 ML/MIN/1.73M2
GLUCOSE BLD-MCNC: 97 MG/DL (ref 70–99)
HCT VFR BLD CALC: 35.7 % (ref 36.3–47.1)
HEMOGLOBIN: 11.3 G/DL (ref 11.9–15.1)
IMMATURE GRANULOCYTES: 0 %
LYMPHOCYTES # BLD: 41 % (ref 24–43)
MCH RBC QN AUTO: 30.1 PG (ref 25.2–33.5)
MCHC RBC AUTO-ENTMCNC: 31.7 G/DL (ref 28.4–34.8)
MCV RBC AUTO: 95.2 FL (ref 82.6–102.9)
MONOCYTES # BLD: 9 % (ref 3–12)
NRBC AUTOMATED: 0 PER 100 WBC
PDW BLD-RTO: 13.3 % (ref 11.8–14.4)
PLATELET # BLD: 297 K/UL (ref 138–453)
PMV BLD AUTO: 9.7 FL (ref 8.1–13.5)
POTASSIUM SERPL-SCNC: 3.9 MMOL/L (ref 3.7–5.3)
RBC # BLD: 3.75 M/UL (ref 3.95–5.11)
SEG NEUTROPHILS: 40 % (ref 36–65)
SEGMENTED NEUTROPHILS ABSOLUTE COUNT: 2.17 K/UL (ref 1.5–8.1)
SODIUM BLD-SCNC: 143 MMOL/L (ref 135–144)
TOTAL PROTEIN: 6.3 G/DL (ref 6.4–8.3)
WBC # BLD: 5.5 K/UL (ref 3.5–11.3)

## 2022-12-06 PROCEDURE — 85025 COMPLETE CBC W/AUTO DIFF WBC: CPT

## 2022-12-06 PROCEDURE — 36415 COLL VENOUS BLD VENIPUNCTURE: CPT

## 2022-12-06 PROCEDURE — P9603 ONE-WAY ALLOW PRORATED MILES: HCPCS

## 2022-12-06 PROCEDURE — 80053 COMPREHEN METABOLIC PANEL: CPT

## 2022-12-14 ENCOUNTER — OFFICE VISIT (OUTPATIENT)
Dept: INTERNAL MEDICINE CLINIC | Age: 72
End: 2022-12-14
Payer: MEDICARE

## 2022-12-14 VITALS — SYSTOLIC BLOOD PRESSURE: 120 MMHG | OXYGEN SATURATION: 95 % | HEART RATE: 69 BPM | DIASTOLIC BLOOD PRESSURE: 60 MMHG

## 2022-12-14 DIAGNOSIS — Z09 HOSPITAL DISCHARGE FOLLOW-UP: ICD-10-CM

## 2022-12-14 DIAGNOSIS — N18.30 TYPE 2 DIABETES MELLITUS WITH STAGE 3 CHRONIC KIDNEY DISEASE, UNSPECIFIED WHETHER LONG TERM INSULIN USE, UNSPECIFIED WHETHER STAGE 3A OR 3B CKD (HCC): ICD-10-CM

## 2022-12-14 DIAGNOSIS — G99.2 STENOSIS OF CERVICAL SPINE WITH MYELOPATHY (HCC): ICD-10-CM

## 2022-12-14 DIAGNOSIS — L03.115 CELLULITIS OF BOTH LOWER EXTREMITIES: Primary | ICD-10-CM

## 2022-12-14 DIAGNOSIS — M48.02 STENOSIS OF CERVICAL SPINE WITH MYELOPATHY (HCC): ICD-10-CM

## 2022-12-14 DIAGNOSIS — E11.22 TYPE 2 DIABETES MELLITUS WITH STAGE 3 CHRONIC KIDNEY DISEASE, UNSPECIFIED WHETHER LONG TERM INSULIN USE, UNSPECIFIED WHETHER STAGE 3A OR 3B CKD (HCC): ICD-10-CM

## 2022-12-14 DIAGNOSIS — M51.36 LUMBAR DEGENERATIVE DISC DISEASE: ICD-10-CM

## 2022-12-14 DIAGNOSIS — L03.116 CELLULITIS OF BOTH LOWER EXTREMITIES: Primary | ICD-10-CM

## 2022-12-14 DIAGNOSIS — R29.6 REPEATED FALLS: ICD-10-CM

## 2022-12-14 DIAGNOSIS — I63.429 CEREBROVASCULAR ACCIDENT (CVA) DUE TO EMBOLISM OF ANTERIOR CEREBRAL ARTERY, UNSPECIFIED BLOOD VESSEL LATERALITY (HCC): ICD-10-CM

## 2022-12-14 DIAGNOSIS — I50.32 CHRONIC DIASTOLIC HEART FAILURE (HCC): ICD-10-CM

## 2022-12-14 PROCEDURE — 3044F HG A1C LEVEL LT 7.0%: CPT | Performed by: INTERNAL MEDICINE

## 2022-12-14 PROCEDURE — 1090F PRES/ABSN URINE INCON ASSESS: CPT | Performed by: INTERNAL MEDICINE

## 2022-12-14 PROCEDURE — 1036F TOBACCO NON-USER: CPT | Performed by: INTERNAL MEDICINE

## 2022-12-14 PROCEDURE — 3078F DIAST BP <80 MM HG: CPT | Performed by: INTERNAL MEDICINE

## 2022-12-14 PROCEDURE — 1123F ACP DISCUSS/DSCN MKR DOCD: CPT | Performed by: INTERNAL MEDICINE

## 2022-12-14 PROCEDURE — 2022F DILAT RTA XM EVC RTNOPTHY: CPT | Performed by: INTERNAL MEDICINE

## 2022-12-14 PROCEDURE — 3074F SYST BP LT 130 MM HG: CPT | Performed by: INTERNAL MEDICINE

## 2022-12-14 PROCEDURE — G8417 CALC BMI ABV UP PARAM F/U: HCPCS | Performed by: INTERNAL MEDICINE

## 2022-12-14 PROCEDURE — 3017F COLORECTAL CA SCREEN DOC REV: CPT | Performed by: INTERNAL MEDICINE

## 2022-12-14 PROCEDURE — G8427 DOCREV CUR MEDS BY ELIG CLIN: HCPCS | Performed by: INTERNAL MEDICINE

## 2022-12-14 PROCEDURE — G8399 PT W/DXA RESULTS DOCUMENT: HCPCS | Performed by: INTERNAL MEDICINE

## 2022-12-14 PROCEDURE — 99213 OFFICE O/P EST LOW 20 MIN: CPT | Performed by: INTERNAL MEDICINE

## 2022-12-14 PROCEDURE — G8484 FLU IMMUNIZE NO ADMIN: HCPCS | Performed by: INTERNAL MEDICINE

## 2022-12-14 PROCEDURE — 1111F DSCHRG MED/CURRENT MED MERGE: CPT | Performed by: INTERNAL MEDICINE

## 2022-12-14 ASSESSMENT — ENCOUNTER SYMPTOMS
RESPIRATORY NEGATIVE: 1
GASTROINTESTINAL NEGATIVE: 1
EYES NEGATIVE: 1

## 2022-12-14 NOTE — PROGRESS NOTES
Visit Information    Have you changed or started any medications since your last visit including any over-the-counter medicines, vitamins, or herbal medicines? no   Are you having any side effects from any of your medications? -  no  Have you stopped taking any of your medications? Is so, why? -  no    Have you seen any other physician or provider since your last visit? Yes - Records Obtained  Have you had any other diagnostic tests since your last visit? Yes - Records Obtained  Have you been seen in the emergency room and/or had an admission to a hospital since we last saw you? Yes - Records Obtained  Have you had your routine dental cleaning in the past 6 months? no    Have you activated your [employer] account? If not, what are your barriers?  Yes     Patient Care Team:  Kely Gastelum MD as PCP - General (Internal Medicine)  Kely Gastelum MD as PCP - [facility] Provider  Reid Mg MD as Consulting Physician (Gastroenterology)    Medical History Review  Past Medical, Family, and Social History reviewed and does contribute to the patient presenting condition    Health Maintenance   Topic Date Due    DTaP/Tdap/Td vaccine (1 - Tdap) Never done    Shingles vaccine (1 of 2) Never done    COVID-19 Vaccine (3 - Booster for Gonzalez Peter series) 06/03/2021    Diabetic retinal exam  07/20/2021    Flu vaccine (1) 08/01/2022    Annual Wellness Visit (AWV)  12/14/2022    A1C test (Diabetic or Prediabetic)  02/01/2023    Diabetic foot exam  03/12/2023    Depression Monitoring  04/20/2023    Breast cancer screen  05/06/2023    Diabetic microalbuminuria test  11/22/2023    Lipids  11/22/2023    Colorectal Cancer Screen  10/07/2026    DEXA (modify frequency per FRAX score)  Completed    Pneumococcal 65+ years Vaccine  Completed    Hepatitis C screen  Completed    Hepatitis A vaccine  Aged Out    Hib vaccine  Aged Out    Meningococcal (ACWY) vaccine  Aged Out

## 2022-12-14 NOTE — PROGRESS NOTES
[address]  [place] [address]  Dept: [phone]  Dept Fax: [phone]    Lili Roberts is a 71 y.o. female who presents today for her medicalconditions/complaints as noted below. Lili Roberts is c/o of Follow-Up from Hospital (Cellulitis/Needs a referral to [facility] )      HPI:     Wound Check  She was originally treated more than 14 days ago (cellulitis in lower extremities on 11/25). Previous treatment included IV/IM antibiotics and wound cleansing or irrigation. The maximum temperature noted was less than 100.4 F. There has been no drainage from the wound. The redness has improved. The swelling has improved. There is no pain present. She requires visiting nurse services for CVA cervical and lumbar spinal stenosis, DM, falls, CHF. Past Medical History:   Diagnosis Date    Allergic rhinitis, cause unspecified     Back pain     lumbar    Bowel obstruction (HCC)     history of due to scar tissue, resolved non-surgically    C. difficile diarrhea     CAD (coronary artery disease)     no stent needed per pt.  Dr. Dary Daigle did cath at  2005    Cardiac murmur     Cellulitis     left leg    Cellulitis 2017 August    leg left leg/bug bite    Cerebral artery occlusion with cerebral infarction [facility])     TIA 2014    COVID-19     ONE YR AGO IN 4/25/2020 fever and cough    Diverticulosis of colon (without mention of hemorrhage)     GERD (gastroesophageal reflux disease)     GERD (gastroesophageal reflux disease)     on rx    History of blood transfusion     approx 2020        History of CHF (congestive heart failure)     History of MI (myocardial infarction) 2005    thought due to a blood clot    History of ovarian cyst 1970    had oopherectomy holly    History of peritonitis 1968    due to ruptured appendix age 13    HTN (hypertension)     Hx of blood clots     right leg    Hyperlipidemia     Intestinal or peritoneal adhesions with obstruction (postoperative) (postinfection) (HCC)     Kidney infection     renal failure/sepsis/spider bite    Lateral epicondylitis  of elbow     MDRO (multiple drug resistant organisms) resistance     c diff    Muscle strain     right posterior shoulder    Other abnormal glucose     PONV (postoperative nausea and vomiting)     dry heaves    Pre-diabetes     Restless legs syndrome (RLS)     Snores     no cpap    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)     TIA (transient ischemic attack) 2014    Uses walker     Vitamin D deficiency     Wears glasses     Wellness examination     last seen 2 weeks ago        Current Outpatient Medications   Medication Sig Dispense Refill    gabapentin (NEURONTIN) 100 MG capsule Take 1 capsule by mouth 2 times daily for 30 days.  30 capsule 0    busPIRone (BUSPAR) 5 MG tablet Take 1 tablet by mouth 3 times daily 90 tablet 3    pantoprazole (PROTONIX) 40 MG tablet Take 1 tablet by mouth every morning (before breakfast) 90 tablet 3    pramipexole (MIRAPEX) 0.5 MG tablet Take 1 tablet by mouth nightly 90 tablet 3    apixaban (ELIQUIS) 5 MG TABS tablet Take 1 tablet by mouth 2 times daily 60 tablet 0    carvedilol (COREG) 12.5 MG tablet Take 1 tablet by mouth 2 times daily 60 tablet 0    citalopram (CELEXA) 20 MG tablet Take 1 tablet by mouth daily 30 tablet 0    atorvastatin (LIPITOR) 20 MG tablet Take 1 tablet by mouth nightly 30 tablet 3    furosemide (LASIX) 40 MG tablet Take 1 tablet by mouth daily 90 tablet 1    fluticasone (FLONASE) 50 MCG/ACT nasal spray 1 spray by Each Nostril route daily 32 g 1    ferrous sulfate (IRON 325) 325 (65 Fe) MG tablet Take 325 mg by mouth daily (with breakfast)      vitamin D (CHOLECALCIFEROL) 25 MCG (1000 UT) TABS tablet Take 1,000 Units by mouth at bedtime      fenofibrate micronized (LOFIBRA) 134 MG capsule Take 1 capsule by mouth every morning (before breakfast) 90 capsule 2    melatonin 3 MG TABS tablet Take 2 tablets by mouth nightly as needed (insomnia)      cyanocobalamin (CVS VITAMIN B12) 1000 MCG tablet Take 1 tablet by mouth daily 30 tablet 3    Calcium Carbonate-Vitamin D 500-125 MG-UNIT TABS Take 1 tablet by mouth nightly        No current facility-administered medications for this visit. Allergies   Allergen Reactions    Bactrim [Sulfamethoxazole-Trimethoprim] Other (See Comments)     confusion    Codeine Itching    Diazepam Other (See Comments)    Meperidine Hcl Other (See Comments)    Seasonal        Health Maintenance   Topic Date Due    DTaP/Tdap/Td vaccine (1 - Tdap) Never done    Shingles vaccine (1 of 2) Never done    COVID-19 Vaccine (3 - Booster for Pfizer series) 06/03/2021    Diabetic retinal exam  07/20/2021    Flu vaccine (1) 08/01/2022    Annual Wellness Visit (AWV)  12/14/2022    A1C test (Diabetic or Prediabetic)  02/01/2023    Diabetic foot exam  03/12/2023    Depression Monitoring  04/20/2023    Breast cancer screen  05/06/2023    Diabetic microalbuminuria test  11/22/2023    Lipids  11/22/2023    Colorectal Cancer Screen  10/07/2026    DEXA (modify frequency per FRAX score)  Completed    Pneumococcal 65+ years Vaccine  Completed    Hepatitis C screen  Completed    Hepatitis A vaccine  Aged Out    Hib vaccine  Aged Out    Meningococcal (ACWY) vaccine  Aged Out       Subjective:      Review of Systems   Constitutional: Negative. HENT: Negative. Eyes: Negative. Respiratory: Negative. Cardiovascular: Negative. Gastrointestinal: Negative. Genitourinary: Negative. Musculoskeletal: Negative. Skin: Negative. Neurological: Negative. Psychiatric/Behavioral: Negative. All other systems reviewed and are negative. Objective:     Physical Exam  Vitals reviewed. Constitutional:       Appearance: Normal appearance. She is well-developed. HENT:      Head: Normocephalic and atraumatic. Eyes:      Conjunctiva/sclera: Conjunctivae normal.      Pupils: Pupils are equal, round, and reactive to light. Neck:      Thyroid: No thyromegaly. Vascular: No JVD. Cardiovascular:      Rate and Rhythm: Normal rate and regular rhythm. Heart sounds: S1 normal and S2 normal. No murmur heard. Pulmonary:      Effort: No respiratory distress. Breath sounds: Normal breath sounds. Abdominal:      General: Bowel sounds are normal.      Palpations: Abdomen is soft. There is no mass. Tenderness: There is no abdominal tenderness. Musculoskeletal:         General: No tenderness. Normal range of motion. Cervical back: Neck supple. Lymphadenopathy:      Cervical: No cervical adenopathy. Skin:     General: Skin is warm and dry. Findings: Erythema (both lower legs has improved) present. Neurological:      Mental Status: She is alert and oriented to person, place, and time. Cranial Nerves: No cranial nerve deficit. Deep Tendon Reflexes: Reflexes are normal and symmetric. Psychiatric:         Behavior: Behavior normal.     /60 (Site: Right Upper Arm, Position: Sitting)   Pulse 69   SpO2 95%       Assessment:       Diagnosis Orders   1. Cellulitis of both lower extremities  [address]      2. Hospital discharge follow-up  [place] DISCHARGE MEDS RECONCILED W/ CURRENT OUTPATIENT MED LIST      3. Cerebrovascular accident (CVA) due to embolism of anterior cerebral artery, unspecified blood vessel laterality [facility])  [address]      4. Type 2 diabetes mellitus with stage 3 chronic kidney disease, unspecified whether long term insulin use, unspecified whether stage 3a or 3b CKD [facility])  [address]      5. Repeated falls  [address]      6. Lumbar degenerative disc disease  [address]      7. Chronic diastolic heart failure [facility]  [address]      8. Stenosis of cervical spine with myelopathy [facility])  [address]:      No follow-ups on file.     No orders of the defined types were placed in this encounter. Orders Placed This Encounter   Procedures    [address]     Referral Priority:   Routine     Referral Type:   Home Health Care     Referral Reason:   Specialty Services Required     Referral Location:   [address]     Requested Specialty:   [address]     Number of Visits Requested:   1    [place] DISCHARGE MEDS RECONCILED W/ CURRENT OUTPATIENT MED LIST            Patient given educational materials - see patient instructions. Discussed use, benefit, and side effects of prescribed medications. All patientquestions answered. Pt voiced understanding.     Electronically signed by Bharat Lerma MD on 12/14/2022at 11:47 AM

## 2022-12-16 ENCOUNTER — TELEPHONE (OUTPATIENT)
Dept: INTERNAL MEDICINE CLINIC | Age: 72
End: 2022-12-16

## 2022-12-16 DIAGNOSIS — I63.429 CEREBROVASCULAR ACCIDENT (CVA) DUE TO EMBOLISM OF ANTERIOR CEREBRAL ARTERY, UNSPECIFIED BLOOD VESSEL LATERALITY (HCC): Primary | ICD-10-CM

## 2022-12-19 ENCOUNTER — TELEPHONE (OUTPATIENT)
Dept: INTERNAL MEDICINE CLINIC | Age: 72
End: 2022-12-19

## 2022-12-19 DIAGNOSIS — Z13.820 OSTEOPOROSIS SCREENING: Primary | ICD-10-CM

## 2022-12-19 NOTE — TELEPHONE ENCOUNTER
The diagnosis code for the Bone Density Scan is flagging red and needs changed so the patient can have this scheduled.     Please advise

## 2022-12-22 DIAGNOSIS — I82.401 DEEP VEIN THROMBOSIS (DVT) OF RIGHT LOWER EXTREMITY, UNSPECIFIED CHRONICITY, UNSPECIFIED VEIN (HCC): ICD-10-CM

## 2022-12-22 DIAGNOSIS — L29.9 ITCHING: ICD-10-CM

## 2022-12-22 RX ORDER — HYDROXYZINE HYDROCHLORIDE 25 MG/1
25 TABLET, FILM COATED ORAL EVERY 8 HOURS PRN
Qty: 30 TABLET | Refills: 0 | Status: SHIPPED | OUTPATIENT
Start: 2022-12-22 | End: 2023-01-01

## 2022-12-23 ENCOUNTER — CARE COORDINATION (OUTPATIENT)
Dept: CARE COORDINATION | Age: 72
End: 2022-12-23

## 2022-12-23 DIAGNOSIS — I63.429 CEREBROVASCULAR ACCIDENT (CVA) DUE TO EMBOLISM OF ANTERIOR CEREBRAL ARTERY, UNSPECIFIED BLOOD VESSEL LATERALITY (HCC): Primary | ICD-10-CM

## 2022-12-23 NOTE — CARE COORDINATION
Patient called the [facility]. stating that she can't think of her nurse. She stated that she has been trying to reach the [facility] for   several days and can't get a returned call, and unsure of what  to do. HC looked up the nurse that care for the patient and will  refer patient to Sayra Steven/JAMES.     Plan of Care  [facility] will send this message to Sayra Steven/JAMES

## 2022-12-23 NOTE — CARE COORDINATION
ACM called patient after receiving a message from Kv[facility] P[place]. Patient stated that she has not heard from home health since being home from the rehab facility. ACM called [address] home who verified that they can not take her insurance. ACM spoke with patient who agreed to try another agency. AC will ask PCP staff to fax to [facility].

## 2022-12-30 ENCOUNTER — TELEPHONE (OUTPATIENT)
Dept: INTERNAL MEDICINE CLINIC | Age: 72
End: 2022-12-30

## 2022-12-30 DIAGNOSIS — I10 PRIMARY HYPERTENSION: Primary | ICD-10-CM

## 2022-12-30 RX ORDER — AMLODIPINE BESYLATE 5 MG/1
5 TABLET ORAL DAILY
Qty: 30 TABLET | Refills: 3 | Status: SHIPPED | OUTPATIENT
Start: 2022-12-30

## 2022-12-30 NOTE — TELEPHONE ENCOUNTER
Opening her  today with home care. At this time her BP is high 168/78 no associated Sx. She is on BP medication and is taking it on a regular basis.     Loki Levine

## 2022-12-30 NOTE — TELEPHONE ENCOUNTER
Sending Norvasc to her pharmacy  Please start Norvasc and continue Coreg, and see how her blood pressure looks like

## 2023-01-03 DIAGNOSIS — I10 PRIMARY HYPERTENSION: ICD-10-CM

## 2023-01-03 RX ORDER — AMLODIPINE BESYLATE 5 MG/1
5 TABLET ORAL DAILY
Qty: 30 TABLET | Refills: 3 | Status: SHIPPED | OUTPATIENT
Start: 2023-01-03

## 2023-01-03 NOTE — TELEPHONE ENCOUNTER
A refill was sent to the pharmacy on 12/30/2022 for a 30 count. Per insurance a 90 day supply is requested. Please change.

## 2023-01-04 ENCOUNTER — OFFICE VISIT (OUTPATIENT)
Dept: PHYSICAL MEDICINE AND REHAB | Age: 73
End: 2023-01-04
Payer: MEDICARE

## 2023-01-04 VITALS
TEMPERATURE: 98.9 F | SYSTOLIC BLOOD PRESSURE: 138 MMHG | BODY MASS INDEX: 33.13 KG/M2 | DIASTOLIC BLOOD PRESSURE: 74 MMHG | HEART RATE: 75 BPM | WEIGHT: 180 LBS | HEIGHT: 62 IN

## 2023-01-04 DIAGNOSIS — G99.2 STENOSIS OF CERVICAL SPINE WITH MYELOPATHY (HCC): Primary | ICD-10-CM

## 2023-01-04 DIAGNOSIS — R27.0 ATAXIA: ICD-10-CM

## 2023-01-04 DIAGNOSIS — M79.10 MYALGIA: ICD-10-CM

## 2023-01-04 DIAGNOSIS — R29.6 FALLS FREQUENTLY: ICD-10-CM

## 2023-01-04 DIAGNOSIS — M48.02 STENOSIS OF CERVICAL SPINE WITH MYELOPATHY (HCC): Primary | ICD-10-CM

## 2023-01-04 PROCEDURE — 3017F COLORECTAL CA SCREEN DOC REV: CPT | Performed by: PHYSICAL MEDICINE & REHABILITATION

## 2023-01-04 PROCEDURE — 99213 OFFICE O/P EST LOW 20 MIN: CPT | Performed by: PHYSICAL MEDICINE & REHABILITATION

## 2023-01-04 PROCEDURE — 1123F ACP DISCUSS/DSCN MKR DOCD: CPT | Performed by: PHYSICAL MEDICINE & REHABILITATION

## 2023-01-04 PROCEDURE — G8417 CALC BMI ABV UP PARAM F/U: HCPCS | Performed by: PHYSICAL MEDICINE & REHABILITATION

## 2023-01-04 PROCEDURE — G8399 PT W/DXA RESULTS DOCUMENT: HCPCS | Performed by: PHYSICAL MEDICINE & REHABILITATION

## 2023-01-04 PROCEDURE — 3078F DIAST BP <80 MM HG: CPT | Performed by: PHYSICAL MEDICINE & REHABILITATION

## 2023-01-04 PROCEDURE — 1036F TOBACCO NON-USER: CPT | Performed by: PHYSICAL MEDICINE & REHABILITATION

## 2023-01-04 PROCEDURE — 3075F SYST BP GE 130 - 139MM HG: CPT | Performed by: PHYSICAL MEDICINE & REHABILITATION

## 2023-01-04 PROCEDURE — G8427 DOCREV CUR MEDS BY ELIG CLIN: HCPCS | Performed by: PHYSICAL MEDICINE & REHABILITATION

## 2023-01-04 PROCEDURE — 1090F PRES/ABSN URINE INCON ASSESS: CPT | Performed by: PHYSICAL MEDICINE & REHABILITATION

## 2023-01-04 PROCEDURE — G8484 FLU IMMUNIZE NO ADMIN: HCPCS | Performed by: PHYSICAL MEDICINE & REHABILITATION

## 2023-01-04 NOTE — PROGRESS NOTES
[facility] PHYSICAL MEDICINE & REHABILITATION  [address] [place] [address]  [place] [address]  Dept: [phone]  Dept Fax: [phone]    Outpatient Followup Note    Mark Lyle, 71 y.o., female, presents for follow up c/o of Shoulder Pain, Neck Pain, and Injections  . HPI:     HPI  Patient with chronic cervical myelopathy and lumbar stenosis with associated aching muscular pain and tightness. She is being seen in follow up today. Since our last visit in May she has had multiple hospitalizations and admissions to both acute inpatient rehabilitation and subacute SNF rehabilitation at [facility], [facility], and [place] [address]. She had a fall at [facility] which led to a L hand/wrist fracture. This has healed. Her  reports upon discharge from [facility] 12/7/22 she was ambulating without a walker. He does note that when she returns home she returns to relying on her walker and ambulating with her head down with a decline in her gait. She has had 2 falls since she discharged home 1 month ago. She continues with some L dominant side weakness from her remote CVA. Today her primary complaint is neck pain. Her pain is mild today at 1/10. At worst her pain reaches 7/10. She denies any improvement in her muscle tightness or pain after trigger point injections in May. Past Medical History:   Diagnosis Date    Allergic rhinitis, cause unspecified     Back pain     lumbar    Bowel obstruction (HCC)     history of due to scar tissue, resolved non-surgically    C. difficile diarrhea     CAD (coronary artery disease)     no stent needed per pt.  Dr. Derian Whitehead did cath at  2005    Cardiac murmur     Cellulitis     left leg    Cellulitis 2017 August    leg left leg/bug bite    Cerebral artery occlusion with cerebral infarction [facility])     TIA 2014    COVID-19     ONE YR AGO IN 4/25/2020 fever and cough    Diverticulosis of colon (without mention of hemorrhage)     GERD (gastroesophageal reflux disease)     GERD (gastroesophageal reflux disease)     on rx    History of blood transfusion     approx 2020        History of CHF (congestive heart failure)     History of MI (myocardial infarction) 2005    thought due to a blood clot    History of ovarian cyst 1970    had oopherectomy holly    History of peritonitis 1968    due to ruptured appendix age 13    HTN (hypertension)     Hx of blood clots     right leg    Hyperlipidemia     Intestinal or peritoneal adhesions with obstruction (postoperative) (postinfection) ([address]     Kidney infection     renal failure/sepsis/spider bite    Lateral epicondylitis  of elbow     MDRO (multiple drug resistant organisms) resistance     c diff    Muscle strain     right posterior shoulder    Other abnormal glucose     PONV (postoperative nausea and vomiting)     dry heaves    Pre-diabetes     Restless legs syndrome (RLS)     Snores     no cpap    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)     TIA (transient ischemic attack) 2014    Uses walker     Vitamin D deficiency     Wears glasses     Wellness examination     last seen 2 weeks ago      Past Surgical History:   Procedure Laterality Date    ABDOMEN SURGERY  1976    benign tumor removed near remaining ovary, 1.5 pounds    APPENDECTOMY  1968    appendix ruptured, developed peritonitis    BACK SURGERY      BUNIONECTOMY Left     along with calcium deposits removed    CARDIAC CATHETERIZATION      CARDIAC CATHETERIZATION  2005    negative    CERVICAL FUSION  05/21/2021    POSTERIOR C3-6 LAMINECTOMY, PARTIAL C7 LAMINECTOMY, FUSION C3-C6, SILVERCORD    CERVICAL FUSION N/A 05/21/2021    POSTERIOR C3-6 LAMINECTOMY, PARTIAL C7 LAMINECTOMY, FUSION C3-C6, SILVERCORD performed by Louisa Cifuentes DO at [address] [place] [address]    12 INCHES REMOVED D/T OBSTRUCTION    COLONOSCOPY      CYST REMOVAL Right     right facial    FINGER CLOSED REDUCTION Left 08/24/2022    LEFT LITTLE FINGER CLOSED REDUCTION PINNING (Left: [place])    FINGER CLOSED REDUCTION Left 2022    CLOSED REDUCTION PINNING LEFT LITTLE FINGER performed by Rayo Zamora MD at . [address])  1972    taken as a result of recurring cysts    LUMBAR FUSION N/A 02/10/2020    LUMBAR L4-5 POSTERIOR  DECOMPRESSION INSTRUMENTATION FUSION WCEMENT AUGMENTATION/ performed by Tori Nails MD at [facility] N/A 2020    L5-S1 PLIF L4-L5 REVISION performed by Tori Nails MD at . [address]  2014    [address] [address] [facility]    UNILATERAL due to cyst    OVARY REMOVAL      partial, due to cyst    SINUS SURGERY      UPPER GASTROINTESTINAL ENDOSCOPY N/A 2019    EGD ESOPHAGOGASTRODUODENOSCOPY performed by Keri Isaac MD at [address] [place] [address] N/A 2019    EGD BIOPSY performed by Loulou Gomez MD at [address] [place] [address] N/A 2019    EGD BIOPSY performed by Keri Isaac MD at [address] Left 2019    WRIST OPEN REDUCTION INTERNAL FIXATION performed by Torrie Staples MD at [facility] History   Problem Relation Age of Onset    Stroke Mother     Diabetes Mother     Heart Disease Mother     High Blood Pressure Mother     Heart Disease Father     Heart Disease Brother     High Blood Pressure Brother     Heart Disease Maternal Grandmother     High Blood Pressure Sister      Social History     Socioeconomic History    Marital status:    Occupational History    Occupation: retired   Tobacco Use    Smoking status: Former     Packs/day: 0.50     Years: 20.00     Pack years: 10.00     Types: Cigarettes     Start date: 1995     Quit date: 2017     Years since quittin.5    Smokeless tobacco: Never   Vaping Use    Vaping Use: Never used   Substance and Sexual Activity    Alcohol use: No     Alcohol/week: 0.0 standard drinks    Drug use: No     Social Determinants of Health     Financial Resource Strain: Low Risk     Difficulty of Paying Living Expenses: Not hard at all   Food Insecurity: No Food Insecurity    Worried About [facility] in the Last Year: Never true    Ran Out of Food in the Last Year: Never true   Transportation Needs: No Transportation Needs    Lack of Transportation (Medical): No    Lack of Transportation (Non-Medical):  No   Physical Activity: Inactive    Days of Exercise per Week: 0 days    Minutes of Exercise per Session: 0 min   Housing Stability: Low Risk     Unable to Pay for Housing in the Last Year: No    Number of Places Lived in the Last Year: 1    Unstable Housing in the Last Year: No       Current Outpatient Medications   Medication Sig Dispense Refill    amLODIPine (NORVASC) 5 MG tablet Take 1 tablet by mouth daily 30 tablet 3    apixaban (ELIQUIS) 5 MG TABS tablet Take 1 tablet by mouth 2 times daily 60 tablet 0    busPIRone (BUSPAR) 5 MG tablet Take 1 tablet by mouth 3 times daily 90 tablet 3    pantoprazole (PROTONIX) 40 MG tablet Take 1 tablet by mouth every morning (before breakfast) 90 tablet 3    pramipexole (MIRAPEX) 0.5 MG tablet Take 1 tablet by mouth nightly 90 tablet 3    carvedilol (COREG) 12.5 MG tablet Take 1 tablet by mouth 2 times daily 60 tablet 0    citalopram (CELEXA) 20 MG tablet Take 1 tablet by mouth daily 30 tablet 0    atorvastatin (LIPITOR) 20 MG tablet Take 1 tablet by mouth nightly 30 tablet 3    furosemide (LASIX) 40 MG tablet Take 1 tablet by mouth daily 90 tablet 1    fluticasone (FLONASE) 50 MCG/ACT nasal spray 1 spray by Each Nostril route daily 32 g 1    ferrous sulfate (IRON 325) 325 (65 Fe) MG tablet Take 325 mg by mouth daily (with breakfast)      vitamin D (CHOLECALCIFEROL) 25 MCG (1000 UT) TABS tablet Take 1,000 Units by mouth at bedtime      fenofibrate micronized (LOFIBRA) 134 MG capsule Take 1 capsule by mouth every morning (before breakfast) 90 capsule 2    melatonin 3 MG TABS tablet Take 2 tablets by mouth nightly as needed (insomnia)      cyanocobalamin (CVS VITAMIN B12) 1000 MCG tablet Take 1 tablet by mouth daily 30 tablet 3    Calcium Carbonate-Vitamin D 500-125 MG-UNIT TABS Take 1 tablet by mouth nightly       gabapentin (NEURONTIN) 100 MG capsule Take 1 capsule by mouth 2 times daily for 30 days. 30 capsule 0     No current facility-administered medications for this visit. Allergies   Allergen Reactions    Bactrim [Sulfamethoxazole-Trimethoprim] Other (See Comments)     confusion    Codeine Itching    Diazepam Other (See Comments)    Meperidine Hcl Other (See Comments)    Seasonal        Subjective:      Review of Systems  Constitutional: Negative for fever, chills and unexpected weight change. HENT: Negative for trouble swallowing. Respiratory: Negative for cough and shortness of breath. Cardiovascular: Negative for chest pain. Endocrine: Negative for polyuria. Genitourinary: Negative for dysuria, urgency, frequency, incontinence and difficulty urinating. Gastrointestinal: Negative for constipation or diarrhea. Musculoskeletal: Positive for arthralgias. Neurological: Negative for headaches, numbness, or tingling. Psychiatric: Negative for depressed mood or anxiety. Objective:     Physical Exam  /74   Pulse 75   Temp 98.9 °F (37.2 °C)   Ht 5' 2\" (1.575 m)   Wt 180 lb (81.6 kg)   BMI 32.92 kg/m²   Constitutional: She appears well-developed and well-nourished. In no distress. HEENT: NCAT, PERRL, EOMI. Mucous membranes pink and moist.  Pulmonary/Chest: Respirations WNL and unlabored. MSK: Functional ROM impaired cervical spine with extension lacking 10 degrees to neutral, R rotation to 30 degrees, L rotation to 10 degrees. Palpable muscle tightness/spasm with trigger points B cervical paraspinal and upper trapezius muscles. Strength 4/5 key muscles L , wrist extension, 3/5 L finger abduction.  4+/5 key muscles RUE and BLEs.  Gait: Antalgic with poor L dorsiflexion and some step to gait. Veers left frequently. Some evidence of unsteadiness even with rolling walker. Improves when she stands straight and looks forward.   Neurological: She is alert and oriented to person, place, and time. RAMBO intact L digits.   Skin: Skin is warm dry and intact with good turgor.   Psychiatric: She has a normal mood and affect.Her behavior is normal. Thought content normal.   Medical assistant note and vitals for today's encounter reviewed.    Diagnostic Studies:     L WRIST X-RAY INTERPRETED BY DR BEYER 8/17/22  X-rays taken in clinic and preliminarily reviewed by me 8/17/22:   3 views of the left wrist demonstrate a comminuted, shortened significant    dorsal angulation of the left fifth metacarpal neck/shaft fracture.     Patient with evidence of old injury to the ulnar styloid.  Volar plate is    present from old distal radius injury     CT HEAD FACIAL BONES 9/6/22  FINDINGS:   CT HEAD:       BRAIN/VENTRICLES: There is no acute intracranial hemorrhage, mass effect or   midline shift. No abnormal extra-axial fluid collection.  The gray-white   differentiation is maintained without evidence of an acute infarct.  There is   no evidence of hydrocephalus.  Mild generalized parenchymal volume loss and   mild chronic small vessel disease.       CT FACIAL BONES:       FACIAL BONES: The maxilla, pterygoid plates and zygomatic arches are intact.   The mandible is intact.  The mandibular condyles are normally situated with   similar advanced degenerative change involving the right TMJ.  The nasal   bones and maxillary nasal processes are intact.       ORBITS: The globes appear intact.  The extraocular muscles, optic nerve   sheath complexes and lacrimal glands appear unremarkable.  No retrobulbar   hematoma or mass is seen.  The orbital walls and rims are intact. Bilateral   pseudophakia.       SINUSES/MASTOIDS:  No acute fracture is seen.   Sequelae of prior endoscopic   sinus surgery with bilateral antrostomy and partial ethmoidectomies. Mild   mucosal thickening involving floor of the maxillary sinuses with unchanged   benign heterotopic ossification within the right maxillary sinus. SOFT TISSUES: No acute abnormality of the visualized skull or soft tissues. Impression   No acute intracranial abnormality. No acute traumatic injury of the facial bones. Assessment:       Diagnosis Orders   1. 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[address]  DME Order for (Specify) as OP      2. Myalgia        3. Ataxia        4. Falls frequently             Plan:      She is resuming home health therapy for gait deterioration. Will order TENS - she can ask home therapist for education on use. Orders Placed This Encounter   Procedures    DME Order for (Specify) as OP     - DME device ordered - TENS unit  Patches and wires for 30 days  Refill:2   - Diagnosis: Cervical stenosis and post-op myalgia  - Length of Need: Lifetime     No orders of the defined types were placed in this encounter. Return in about 3 months (around 4/4/2023). Electronically signedby Debo Melo MD on 1/4/2023 at 11:08 AM.     Please note that this chartwas generated using voice recognition Dragon dictation software. Although everyeffort was made to ensure the accuracy of this automated transcription, some errorsin transcription may have occurred.

## 2023-01-05 ENCOUNTER — HOSPITAL ENCOUNTER (OUTPATIENT)
Dept: WOMENS IMAGING | Age: 73
Discharge: HOME | End: 2023-01-07

## 2023-01-05 ENCOUNTER — TELEPHONE (OUTPATIENT)
Dept: INTERNAL MEDICINE CLINIC | Age: 73
End: 2023-01-05

## 2023-01-05 DIAGNOSIS — Z78.0 POSTMENOPAUSAL STATUS: Primary | ICD-10-CM

## 2023-01-05 DIAGNOSIS — Z13.820 OSTEOPOROSIS SCREENING: ICD-10-CM

## 2023-01-05 NOTE — TELEPHONE ENCOUNTER
Francisco Christian from scheduling called stating that the diagnosis code was still not working for insurance to cover and I pended the new order with code they gave me.     Please sign

## 2023-01-06 ENCOUNTER — CARE COORDINATION (OUTPATIENT)
Dept: CARE COORDINATION | Age: 73
End: 2023-01-06

## 2023-01-10 ENCOUNTER — HOSPITAL ENCOUNTER (OUTPATIENT)
Dept: WOMENS IMAGING | Age: 73
Discharge: HOME | End: 2023-01-12
Payer: MEDICARE

## 2023-01-10 DIAGNOSIS — Z78.0 POSTMENOPAUSAL STATUS: ICD-10-CM

## 2023-01-10 PROCEDURE — 77080 DXA BONE DENSITY AXIAL: CPT

## 2023-01-11 ENCOUNTER — TELEPHONE (OUTPATIENT)
Dept: INTERNAL MEDICINE CLINIC | Age: 73
End: 2023-01-11

## 2023-01-11 DIAGNOSIS — M81.0 OSTEOPOROSIS, UNSPECIFIED OSTEOPOROSIS TYPE, UNSPECIFIED PATHOLOGICAL FRACTURE PRESENCE: ICD-10-CM

## 2023-01-11 DIAGNOSIS — M81.0 OSTEOPOROSIS, UNSPECIFIED OSTEOPOROSIS TYPE, UNSPECIFIED PATHOLOGICAL FRACTURE PRESENCE: Primary | ICD-10-CM

## 2023-01-11 RX ORDER — ALENDRONATE SODIUM 70 MG/1
70 TABLET ORAL
Qty: 4 TABLET | Refills: 3 | Status: SHIPPED | OUTPATIENT
Start: 2023-01-11

## 2023-01-12 RX ORDER — ALENDRONATE SODIUM 70 MG/1
TABLET ORAL
Qty: 12 TABLET | OUTPATIENT
Start: 2023-01-12

## 2023-01-13 ENCOUNTER — CARE COORDINATION (OUTPATIENT)
Dept: CARE COORDINATION | Age: 73
End: 2023-01-13

## 2023-01-13 NOTE — CARE COORDINATION
Ambulatory Care Coordination Note  1/13/2023    ACC: Santosh Guerrero, RN  Summary  Date Care Coordination Episode Started:  1.6.23      Reason patient is in Care Coordination:     PCP referral  Topics Discussed Today:     Medications, per patient her spouse is picking them up now, nor concerns. CHF, no issues reported  DM, no concerns. Patient denied any needs today. Assessments completed today:     Fall risk  Initial assessment  Medication reconciliation  SDOH  DM, CHF (Health assessments)      Care Coordinator plan of care: Follow up call in 1-2 weeks for any needs, continue education. Goals Addressed                   This Visit's Progress     Reduce Falls    Improving     I will reduce my risk of falls by the following: Use walking aids like cane or walker    Barriers: overwhelmed by complexity of regimen  Plan for overcoming my barriers: work with Duran Ziggy  Confidence: 9/10  Anticipated Goal Completion Date: 11.22.22              Prior to Admission medications    Medication Sig Start Date End Date Taking? Authorizing Provider   alendronate (FOSAMAX) 70 MG tablet Take 1 tablet by mouth every 7 days Take with a full glass of water upon arising for the day. Consume on an empty stomach at least 30 minutes before the first food, beverage, or medication. Stay upright (do not lie down) for at least 30 minutes. Do not crush or break. 1/11/23   Abraham Still MD   amLODIPine (NORVASC) 5 MG tablet Take 1 tablet by mouth daily 1/3/23   Abraham Still MD   apixaban (ELIQUIS) 5 MG TABS tablet Take 1 tablet by mouth 2 times daily 12/22/22   Abraham Still MD   gabapentin (NEURONTIN) 100 MG capsule Take 1 capsule by mouth 2 times daily for 30 days.  11/27/22 12/27/22  Lissett Wall MD   busPIRone (BUSPAR) 5 MG tablet Take 1 tablet by mouth 3 times daily 11/15/22   Abraham Still MD   pantoprazole (PROTONIX) 40 MG tablet Take 1 tablet by mouth every morning (before breakfast) 11/15/22   Abraham Still MD pramipexole (MIRAPEX) 0.5 MG tablet Take 1 tablet by mouth nightly 11/15/22   Scot Section, MD   carvedilol (COREG) 12.5 MG tablet Take 1 tablet by mouth 2 times daily 10/20/22   Scot Section, MD   citalopram (CELEXA) 20 MG tablet Take 1 tablet by mouth daily 10/20/22   Scot Section, MD   atorvastatin (LIPITOR) 20 MG tablet Take 1 tablet by mouth nightly 10/20/22   Scot Section, MD   furosemide (LASIX) 40 MG tablet Take 1 tablet by mouth daily 10/20/22   Scot Section, MD   fluticasone (FLONASE) 50 MCG/ACT nasal spray 1 spray by Each Nostril route daily 10/20/22   Scot Section, MD   ferrous sulfate (IRON 325) 325 (65 Fe) MG tablet Take 325 mg by mouth daily (with breakfast)    Historical Provider, MD   vitamin D (CHOLECALCIFEROL) 25 MCG (1000 UT) TABS tablet Take 1,000 Units by mouth at bedtime    Historical Provider, MD   fenofibrate micronized (LOFIBRA) 134 MG capsule Take 1 capsule by mouth every morning (before breakfast) 5/23/22   Scot Section, MD   albuterol-ipratropium (COMBIVENT RESPIMAT)  MCG/ACT AERS inhaler Inhale 1 puff into the lungs every 6 hours as needed for Wheezing or Shortness of Breath  Patient not taking: Reported on 9/6/2022 4/10/22 9/9/22  Carlitos Hendrix MD   melatonin 3 MG TABS tablet Take 2 tablets by mouth nightly as needed (insomnia) 4/7/22   Carlitos Hendrix MD   acetaminophen (TYLENOL) 325 MG tablet Take 2 tablets by mouth every 4 hours as needed for Pain 4/7/22 9/9/22  Carlitos Hendrix MD   cyanocobalamin ([facility] VITAMIN B12) 1000 MCG tablet Take 1 tablet by mouth daily 12/3/20   Scot Section, MD   Calcium Carbonate-Vitamin D 500-125 MG-UNIT TABS Take 1 tablet by mouth nightly     Historical Provider, MD       Future Appointments   Date Time Provider Britni Marshall   4/5/2023  2:30 PM Joey Ford MD [address]   ,   Diabetes Assessment      Meal Planning: Other   How often do you test your blood sugar?: Other       No patient-reported symptoms        , Congestive Heart Failure Assessment    Are you currently restricting fluids?: Other  Do you understand a low sodium diet?: Yes  Do you understand how to read food labels?: Yes  Do you salt your food before tasting it?: No         Symptoms:     Symptom course: stable  Salt intake watch compared to last visit: stable     , and   General Assessment    Do you have any symptoms that are causing concern?: No

## 2023-01-27 ENCOUNTER — OFFICE VISIT (OUTPATIENT)
Dept: INTERNAL MEDICINE CLINIC | Age: 73
End: 2023-01-27

## 2023-01-27 ENCOUNTER — TELEPHONE (OUTPATIENT)
Dept: INTERNAL MEDICINE CLINIC | Age: 73
End: 2023-01-27

## 2023-01-27 VITALS
DIASTOLIC BLOOD PRESSURE: 90 MMHG | HEART RATE: 73 BPM | WEIGHT: 180 LBS | OXYGEN SATURATION: 96 % | SYSTOLIC BLOOD PRESSURE: 172 MMHG | HEIGHT: 62 IN | BODY MASS INDEX: 33.13 KG/M2

## 2023-01-27 DIAGNOSIS — I82.401 DEEP VEIN THROMBOSIS (DVT) OF RIGHT LOWER EXTREMITY, UNSPECIFIED CHRONICITY, UNSPECIFIED VEIN (HCC): ICD-10-CM

## 2023-01-27 DIAGNOSIS — I10 PRIMARY HYPERTENSION: ICD-10-CM

## 2023-01-27 DIAGNOSIS — I50.32 CHRONIC DIASTOLIC HEART FAILURE (HCC): ICD-10-CM

## 2023-01-27 DIAGNOSIS — R19.5 LOOSE STOOLS: Primary | ICD-10-CM

## 2023-01-27 DIAGNOSIS — B37.31 CANDIDA VAGINITIS: ICD-10-CM

## 2023-01-27 DIAGNOSIS — F33.1 MAJOR DEPRESSIVE DISORDER, RECURRENT, MODERATE (HCC): ICD-10-CM

## 2023-01-27 DIAGNOSIS — N18.30 TYPE 2 DIABETES MELLITUS WITH STAGE 3 CHRONIC KIDNEY DISEASE, UNSPECIFIED WHETHER LONG TERM INSULIN USE, UNSPECIFIED WHETHER STAGE 3A OR 3B CKD (HCC): ICD-10-CM

## 2023-01-27 DIAGNOSIS — E11.22 TYPE 2 DIABETES MELLITUS WITH STAGE 3 CHRONIC KIDNEY DISEASE, UNSPECIFIED WHETHER LONG TERM INSULIN USE, UNSPECIFIED WHETHER STAGE 3A OR 3B CKD (HCC): ICD-10-CM

## 2023-01-27 RX ORDER — FLUCONAZOLE 150 MG/1
150 TABLET ORAL
Qty: 2 TABLET | Refills: 0 | Status: SHIPPED | OUTPATIENT
Start: 2023-01-27 | End: 2023-02-02

## 2023-01-27 RX ORDER — LOPERAMIDE HYDROCHLORIDE 2 MG/1
2 CAPSULE ORAL 4 TIMES DAILY PRN
Qty: 40 CAPSULE | Refills: 0 | Status: SHIPPED | OUTPATIENT
Start: 2023-01-27 | End: 2023-02-06

## 2023-01-27 RX ORDER — AMLODIPINE BESYLATE 10 MG/1
10 TABLET ORAL DAILY
Qty: 90 TABLET | Refills: 2 | Status: SHIPPED | OUTPATIENT
Start: 2023-01-27

## 2023-01-27 ASSESSMENT — PATIENT HEALTH QUESTIONNAIRE - PHQ9
5. POOR APPETITE OR OVEREATING: 0
2. FEELING DOWN, DEPRESSED OR HOPELESS: 0
7. TROUBLE CONCENTRATING ON THINGS, SUCH AS READING THE NEWSPAPER OR WATCHING TELEVISION: 0
SUM OF ALL RESPONSES TO PHQ QUESTIONS 1-9: 0
SUM OF ALL RESPONSES TO PHQ QUESTIONS 1-9: 0
4. FEELING TIRED OR HAVING LITTLE ENERGY: 0
1. LITTLE INTEREST OR PLEASURE IN DOING THINGS: 0
SUM OF ALL RESPONSES TO PHQ QUESTIONS 1-9: 0
SUM OF ALL RESPONSES TO PHQ9 QUESTIONS 1 & 2: 0
SUM OF ALL RESPONSES TO PHQ QUESTIONS 1-9: 0
9. THOUGHTS THAT YOU WOULD BE BETTER OFF DEAD, OR OF HURTING YOURSELF: 0
10. IF YOU CHECKED OFF ANY PROBLEMS, HOW DIFFICULT HAVE THESE PROBLEMS MADE IT FOR YOU TO DO YOUR WORK, TAKE CARE OF THINGS AT HOME, OR GET ALONG WITH OTHER PEOPLE: 0
8. MOVING OR SPEAKING SO SLOWLY THAT OTHER PEOPLE COULD HAVE NOTICED. OR THE OPPOSITE, BEING SO FIGETY OR RESTLESS THAT YOU HAVE BEEN MOVING AROUND A LOT MORE THAN USUAL: 0
3. TROUBLE FALLING OR STAYING ASLEEP: 0
6. FEELING BAD ABOUT YOURSELF - OR THAT YOU ARE A FAILURE OR HAVE LET YOURSELF OR YOUR FAMILY DOWN: 0

## 2023-01-27 ASSESSMENT — ENCOUNTER SYMPTOMS
SHORTNESS OF BREATH: 0
ABDOMINAL PAIN: 0
COUGH: 0
EYE PAIN: 0
CONSTIPATION: 0
BACK PAIN: 0
COLOR CHANGE: 0
CHOKING: 0
EYE ITCHING: 0
APNEA: 0
ABDOMINAL DISTENTION: 0
EYE REDNESS: 0
EYE DISCHARGE: 0
CHEST TIGHTNESS: 0
DIARRHEA: 1
BLOOD IN STOOL: 0

## 2023-01-27 NOTE — TELEPHONE ENCOUNTER
Patient called to cancel her appt and stated that she had an accident in her pants. Patient also stated that her leg is swollen. Patient states that she is probably just going to the hospital.

## 2023-01-27 NOTE — PROGRESS NOTES
Subjective:      Patient ID: Ian Logan is a 68 y.o. female. HPIpatient past medical history multiple medical problem which include obesity, history of stroke with Left sided weekness , heart failure with preserved ejection fraction, chronic DVT on anticoagulation, hypertension  Brennen Cantor ,has Home health coming at her home   On AC   Noticed loose stools , till started on fosamax   Wants to continue with medication and see , how she feels    is very concerned , he had Heart Attack, cannot take care of her , Requesting Referral to subacute care     Review of Systems   Constitutional:  Positive for unexpected weight change (weight gain). Negative for activity change, appetite change, chills, diaphoresis, fatigue and fever. HENT:  Negative for congestion, dental problem, drooling and ear discharge. Eyes:  Negative for pain, discharge, redness and itching. Respiratory:  Negative for apnea, cough, choking, chest tightness and shortness of breath. Cardiovascular:  Positive for leg swelling. Negative for chest pain. Gastrointestinal:  Positive for diarrhea. Negative for abdominal distention, abdominal pain, blood in stool and constipation. Endocrine: Negative for cold intolerance and heat intolerance. Genitourinary:  Negative for difficulty urinating, dysuria, enuresis, flank pain and frequency. Vaginal Itching    Musculoskeletal:  Positive for arthralgias (Pain in Left leg with Redness and swelling). Negative for back pain, gait problem and joint swelling. Skin:  Negative for color change, pallor and rash. Neurological:  Positive for dizziness and weakness (weekness on Left side of body , wheel chair bound). Negative for facial asymmetry, light-headedness, numbness and headaches. Recurent Falls    Psychiatric/Behavioral:  Negative for agitation, behavioral problems, confusion, decreased concentration and dysphoric mood.       Objective:   Physical Exam  Constitutional: Appearance: She is well-developed. She is not diaphoretic. Comments: Wheel chair bound    HENT:      Head: Normocephalic and atraumatic. Mouth/Throat:      Pharynx: No oropharyngeal exudate. Eyes:      General: No scleral icterus. Right eye: No discharge. Left eye: No discharge. Conjunctiva/sclera: Conjunctivae normal.      Pupils: Pupils are equal, round, and reactive to light. Neck:      Thyroid: No thyromegaly. Vascular: No JVD. Trachea: No tracheal deviation. Cardiovascular:      Rate and Rhythm: Normal rate. Heart sounds: Normal heart sounds. No murmur heard. No gallop. Pulmonary:      Effort: Pulmonary effort is normal. No respiratory distress. Breath sounds: Normal breath sounds. No stridor. No wheezing or rales. Chest:      Chest wall: No tenderness. Abdominal:      General: Bowel sounds are normal. There is no distension. Palpations: Abdomen is soft. Tenderness: There is no abdominal tenderness. There is no guarding or rebound. Musculoskeletal:         General: Swelling (2 plus Pitting pedal edema in bothlegs) and tenderness (with redness and swelling of Left leg) present. Normal range of motion. Cervical back: Normal range of motion and neck supple. Neurological:      Mental Status: She is alert and oriented to person, place, and time. Assessment / Plan:  1. Deep vein thrombosis (DVT) of right lower extremity, unspecified chronicity, unspecified vein (HCC)  - apixaban (ELIQUIS) 5 MG TABS tablet; Take 1 tablet by mouth 2 times daily  Dispense: 60 tablet; Refill: 0    2. Primary hypertension  Uncontrolled, increasing Norvasc to 10  - amLODIPine (NORVASC) 10 MG tablet; Take 1 tablet by mouth daily  Dispense: 90 tablet; Refill: 2    3. Chronic diastolic heart failure (HCC)  Compensated, on  Lasix    4. Major depressive disorder, recurrent, moderate (HCC)  Stable    5.  Type 2 diabetes mellitus with stage 3 chronic kidney disease, unspecified whether long term insulin use, unspecified whether stage 3a or 3b CKD ([facility] [address]  HBAIC is 5.1     6. Loose stools  - loperamide (RA ANTI-DIARRHEAL) 2 MG capsule; Take 1 capsule by mouth 4 times daily as needed for Diarrhea  Dispense: 40 capsule; Refill: 0    7. Candida vaginitis  - fluconazole (DIFLUCAN) 150 MG tablet; Take 1 tablet by mouth every 72 hours for 6 days  Dispense: 2 tablet; Refill: 0      Return in about 4 weeks (around 2/24/2023). Reviewed prior labs and health maintenance. Discussed use, benefit, and side effects of prescribed medications. Barriers to medication compliance addressed. All patient questions answered. Pt voiced understanding. Lord Paty MD  [facility]  1/27/2023, 4:41 PM    Please note that this chart was generated using voice recognition Dragon dictation software. Although every effort was made to ensure the accuracy of this automated transcription, some errors in transcription may have occurred. Visit Information    Have you changed or started any medications since your last visit including any over-the-counter medicines, vitamins, or herbal medicines? no   Are you having any side effects from any of your medications? -  no  Have you stopped taking any of your medications? Is so, why? -  no    Have you seen any other physician or provider since your last visit? No  Have you had any other diagnostic tests since your last visit? No  Have you been seen in the emergency room and/or had an admission to a hospital since we last saw you? No  Have you had your routine dental cleaning in the past 6 months? no    Have you activated your [employer] account? If not, what are your barriers?  Yes     Patient Care Team:  Lord Paty MD as PCP - General (Internal Medicine)  Lord Paty MD as PCP - [facility] Provider  Mattie Hernadez RN as Ambulatory Care Manager    Medical History Review  Past Medical, Family, and Social History reviewed and does contribute to the patient presenting condition    Health Maintenance   Topic Date Due    DTaP/Tdap/Td vaccine (1 - Tdap) Never done    Shingles vaccine (1 of 2) Never done    COVID-19 Vaccine (3 - Booster for Pfizer series) 06/03/2021    Diabetic retinal exam  07/20/2021    Flu vaccine (1) 08/01/2022    Annual Wellness Visit (AWV)  12/26/2022    A1C test (Diabetic or Prediabetic)  02/01/2023    Diabetic foot exam  03/12/2023    Depression Monitoring  04/20/2023    Breast cancer screen  05/06/2023    Diabetic Alb to Cr ratio (uACR) test  11/22/2023    Lipids  11/22/2023    GFR test (Diabetes, CKD 3-4, OR last GFR 15-59)  12/06/2023    Colorectal Cancer Screen  10/07/2026    DEXA (modify frequency per FRAX score)  Completed    Pneumococcal 65+ years Vaccine  Completed    Hepatitis C screen  Completed    Hepatitis A vaccine  Aged Out    Hib vaccine  Aged Out    Meningococcal (ACWY) vaccine  Aged Out

## 2023-01-30 DIAGNOSIS — E11.59 TYPE 2 DIABETES MELLITUS WITH OTHER CIRCULATORY COMPLICATION, WITHOUT LONG-TERM CURRENT USE OF INSULIN (HCC): ICD-10-CM

## 2023-01-30 DIAGNOSIS — E78.5 HYPERLIPIDEMIA, UNSPECIFIED HYPERLIPIDEMIA TYPE: ICD-10-CM

## 2023-01-30 RX ORDER — ATORVASTATIN CALCIUM 20 MG/1
20 TABLET, FILM COATED ORAL NIGHTLY
Qty: 30 TABLET | Refills: 3 | Status: SHIPPED | OUTPATIENT
Start: 2023-01-30

## 2023-01-30 RX ORDER — FENOFIBRATE 134 MG/1
134 CAPSULE ORAL
Qty: 90 CAPSULE | Refills: 2 | Status: SHIPPED | OUTPATIENT
Start: 2023-01-30

## 2023-02-01 ENCOUNTER — CARE COORDINATION (OUTPATIENT)
Dept: CARE COORDINATION | Age: 73
End: 2023-02-01

## 2023-02-01 ENCOUNTER — TELEPHONE (OUTPATIENT)
Dept: INTERNAL MEDICINE CLINIC | Age: 73
End: 2023-02-01

## 2023-02-01 NOTE — CARE COORDINATION
ACM spoke with patient at PCP request regarding placement. Patient continues to have falls, she would like to go to a 30 day rehab facility. NACHOM explained that she will call her PT Franky Melissa) on 2.2.23 and discuss his recommendations that may support placement.

## 2023-02-01 NOTE — TELEPHONE ENCOUNTER
Please, reach out to [place], if we can arrange a placement for this patient at [address] [place] [address]

## 2023-02-01 NOTE — TELEPHONE ENCOUNTER
Shashi from home pt called to state that patient reported a fall on Sat January 28th and another on on Tues January 31st. Shashi reports small bruise on hand but no other injuries.

## 2023-02-02 DIAGNOSIS — F32.A DEPRESSION, UNSPECIFIED DEPRESSION TYPE: ICD-10-CM

## 2023-02-02 RX ORDER — CITALOPRAM 20 MG/1
20 TABLET ORAL DAILY
Qty: 30 TABLET | Refills: 0 | Status: SHIPPED | OUTPATIENT
Start: 2023-02-02

## 2023-02-02 NOTE — CARE COORDINATION
Left VM for Scot (PT) requesting a return call to discuss his recommendations for placement. ACM left message requesting a call back from Tejas,  at [facility].

## 2023-02-03 NOTE — CARE COORDINATION
ACM called [facility] and left [facility] for Tejas,  regarding PT notes. ACM spoke with patient to explain that ACM is waiting on a return call from home health. Patient reported another fall, per patient her arm hurts, on the inside, no cuts or bruises. Declined asking PCP for an xray, she stated \"I don't think it is broke\". ACM will wait for response from home care and check on patient Monday.

## 2023-02-06 ENCOUNTER — CARE COORDINATION (OUTPATIENT)
Dept: CARE COORDINATION | Age: 73
End: 2023-02-06

## 2023-02-06 NOTE — CARE COORDINATION
JAMES received VM from [facility] at home care stating that patient is looking for long term care. He stated her PT believes this to appropriate as she is a fall risk daily and does not see this improving. JAMES left VM with Madonna Mckeon at [place] and requested a return call. Patient preference per [place] antonio.

## 2023-02-06 NOTE — CARE COORDINATION
JAMES spoke with Tejas at [facility] who confirmed that patient does not want long term care she would like a 30 day skilled facility. He stated he talked to [place] at [facility] and updated her. JAMES told Tejas that PCP office will fax last office note as well. JAMES left VM for patient explaining process has been started with [place]. JAMES requested a return call from patient if she receives any updates regarding SNF.

## 2023-02-06 NOTE — CARE COORDINATION
JAMES received VM from Kalyn Orozco at [place] asking for a demographic, last office note (1.27.23)and med list for patient to be faxed to [phone] so that they can see if she can get into long term care. She mentioned patient may need to qualify for medicaid. JAMES will have office fax information.

## 2023-02-08 ENCOUNTER — CARE COORDINATION (OUTPATIENT)
Dept: CARE COORDINATION | Age: 73
End: 2023-02-08

## 2023-02-08 NOTE — CARE COORDINATION
Patient called ACM stating PT was out today and they will be sending their notes to [address] [place] [address]. Patient stated that they are just waiting on insurance approval.   [facility] or PCP today.

## 2023-02-09 ENCOUNTER — CARE COORDINATION (OUTPATIENT)
Dept: CARE COORDINATION | Age: 73
End: 2023-02-09

## 2023-02-09 NOTE — CARE COORDINATION
ACM received a call from Reji Henriquez at [facility] stating patient was approved for SNF from insurance and she will be admitted today. ACM will update PCP and remove self from care team until patient is home or moves into long term.

## 2023-02-13 ENCOUNTER — APPOINTMENT (OUTPATIENT)
Dept: CT IMAGING | Age: 73
End: 2023-02-13
Payer: MEDICARE

## 2023-02-13 ENCOUNTER — HOSPITAL ENCOUNTER (EMERGENCY)
Age: 73
Discharge: HOME | End: 2023-02-13
Attending: EMERGENCY MEDICINE
Payer: MEDICARE

## 2023-02-13 VITALS
DIASTOLIC BLOOD PRESSURE: 79 MMHG | HEIGHT: 62 IN | BODY MASS INDEX: 35.51 KG/M2 | WEIGHT: 193 LBS | SYSTOLIC BLOOD PRESSURE: 168 MMHG | RESPIRATION RATE: 18 BRPM | OXYGEN SATURATION: 96 % | HEART RATE: 78 BPM | TEMPERATURE: 97.6 F

## 2023-02-13 DIAGNOSIS — M54.2 NECK PAIN: ICD-10-CM

## 2023-02-13 DIAGNOSIS — S01.01XA LACERATION OF SCALP, INITIAL ENCOUNTER: Primary | ICD-10-CM
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
## 2023-02-13 PROCEDURE — 72125 CT NECK SPINE W/O DYE: CPT

## 2023-02-13 PROCEDURE — 6360000002 HC RX W HCPCS: Performed by: EMERGENCY MEDICINE

## 2023-02-13 PROCEDURE — 90471 IMMUNIZATION ADMIN: CPT | Performed by: EMERGENCY MEDICINE

## 2023-02-13 PROCEDURE — 72131 CT LUMBAR SPINE W/O DYE: CPT

## 2023-02-13 PROCEDURE — 6370000000 HC RX 637 (ALT 250 FOR IP): Performed by: EMERGENCY MEDICINE

## 2023-02-13 PROCEDURE — 99284 EMERGENCY DEPT VISIT MOD MDM: CPT

## 2023-02-13 PROCEDURE — 70450 CT HEAD/BRAIN W/O DYE: CPT

## 2023-02-13 PROCEDURE — 12002 RPR S/N/AX/GEN/TRNK2.6-7.5CM: CPT

## 2023-02-13 PROCEDURE — 90715 TDAP VACCINE 7 YRS/> IM: CPT | Performed by: EMERGENCY MEDICINE

## 2023-02-13 RX ORDER — ACETAMINOPHEN 500 MG
1000 TABLET ORAL ONCE
Status: COMPLETED | OUTPATIENT
Start: 2023-02-13 | End: 2023-02-13

## 2023-02-13 RX ORDER — HYDROCODONE BITARTRATE AND ACETAMINOPHEN 5; 325 MG/1; MG/1
1 TABLET ORAL EVERY 6 HOURS PRN
Qty: 12 TABLET | Refills: 0 | Status: SHIPPED | OUTPATIENT
Start: 2023-02-13 | End: 2023-02-16

## 2023-02-13 RX ADMIN — ACETAMINOPHEN 1000 MG: 500 TABLET ORAL at 16:43

## 2023-02-13 RX ADMIN — TETANUS TOXOID, REDUCED DIPHTHERIA TOXOID AND ACELLULAR PERTUSSIS VACCINE, ADSORBED 0.5 ML: 5; 2.5; 8; 8; 2.5 SUSPENSION INTRAMUSCULAR at 16:09

## 2023-02-13 ASSESSMENT — ENCOUNTER SYMPTOMS
BACK PAIN: 0
SHORTNESS OF BREATH: 0
COLOR CHANGE: 0
ABDOMINAL PAIN: 0
EYE PAIN: 0

## 2023-02-13 ASSESSMENT — PAIN SCALES - GENERAL
PAINLEVEL_OUTOF10: 7
PAINLEVEL_OUTOF10: 6
PAINLEVEL_OUTOF10: 10

## 2023-02-13 ASSESSMENT — PAIN DESCRIPTION - LOCATION: LOCATION: HEAD

## 2023-02-13 ASSESSMENT — PAIN DESCRIPTION - ORIENTATION: ORIENTATION: POSTERIOR;RIGHT

## 2023-02-13 ASSESSMENT — PAIN - FUNCTIONAL ASSESSMENT
PAIN_FUNCTIONAL_ASSESSMENT: 0-10
PAIN_FUNCTIONAL_ASSESSMENT: 0-10

## 2023-02-13 ASSESSMENT — LIFESTYLE VARIABLES
HOW MANY STANDARD DRINKS CONTAINING ALCOHOL DO YOU HAVE ON A TYPICAL DAY: PATIENT DOES NOT DRINK
HOW OFTEN DO YOU HAVE A DRINK CONTAINING ALCOHOL: NEVER

## 2023-02-13 ASSESSMENT — PAIN DESCRIPTION - PAIN TYPE: TYPE: ACUTE PAIN

## 2023-02-13 ASSESSMENT — PAIN DESCRIPTION - DESCRIPTORS: DESCRIPTORS: OTHER (COMMENT)

## 2023-02-13 NOTE — ED NOTES
Handoff and report given to [facility].      Micki Sharma RN  02/13/23 [id number]

## 2023-02-13 NOTE — ED TRIAGE NOTES
Mode of arrival (squad #, walk in, police, etc) : squad        Chief complaint(s): Pt c/o fall        Arrival Note (brief scenario, treatment PTA, etc). : Pt states she was in the bathroom this afternoon, lost her footing, fell and hit her head on the bathroom floor. Thus resulting in a laceration on posterior right side of head. C= \"Have you ever felt that you should Cut down on your drinking? \"  No  A= \"Have people Annoyed you by criticizing your drinking? \"  No  G= \"Have you ever felt bad or Guilty about your drinking? \"  No  E= \"Have you ever had a drink as an Eye-opener first thing in the morning to steady your nerves or to help a hangover? \"  No      Deferred []      Reason for deferring: N/A    *If yes to two or more: probable alcohol abuse. *

## 2023-02-13 NOTE — ED PROVIDER NOTES
EMERGENCY DEPARTMENT ENCOUNTER    Pt Name: Sanju Grissom  MRN: [id number]  [place] 1950  Date of evaluation: 2/13/23  CHIEF COMPLAINT       Chief Complaint   Patient presents with    Fall     Pt was in her bathroom this afternoon, lost her balance, fell and hit her head on the bathroom floor. HISTORY OF PRESENT ILLNESS   36-year-old female presents after mechanical fall. Patient reports that she was in the bathroom lost her balance fell and hit her head on the floor. She denies any loss of consciousness, complaining of some neck and lower back pain. She denies any new numbness tingling or weakness to the extremities denies any bowel bladder incontinence or saddle paresthesias. She denies any dizziness or lightheadedness prior to the fall denies any chest pain or shortness of breath. Denies any abdominal pain nausea or vomiting. She does report that she takes Eliquis. She is also complaining of a laceration to the right parietal scalp laceration was repaired see procedure note, CT head and lumbar spine were negative, CT of the C-spine was negative for fracture was showing widening of the posterior disc at C6-C7 concerning for possible PLL disruption imaging reviewed reviewed showing reviewed    The history is provided by the patient. REVIEW OF SYSTEMS     Review of Systems   Constitutional:  Negative for fever. HENT:  Negative for congestion and ear pain. Eyes:  Negative for pain. Respiratory:  Negative for shortness of breath. Cardiovascular:  Negative for chest pain, palpitations and leg swelling. Gastrointestinal:  Negative for abdominal pain. Genitourinary:  Negative for dysuria and flank pain. Musculoskeletal:  Positive for neck pain. Negative for back pain. Skin:  Negative for color change. Neurological:  Positive for headaches. Negative for numbness. Psychiatric/Behavioral:  Negative for confusion. All other systems reviewed and are negative.   PASTMEDICAL HISTORY Past Medical History:   Diagnosis Date    Allergic rhinitis, cause unspecified     Back pain     lumbar    Bowel obstruction (HCC)     history of due to scar tissue, resolved non-surgically    C. difficile diarrhea     CAD (coronary artery disease)     no stent needed per pt.  Dr. Marjorie Blackburn did cath at  2005    Cardiac murmur     Cellulitis     left leg    Cellulitis 2017 August    leg left leg/bug bite    Cerebral artery occlusion with cerebral infarction [facility])     TIA 2014    COVID-19     ONE YR AGO IN 4/25/2020 fever and cough    Diverticulosis of colon (without mention of hemorrhage)     GERD (gastroesophageal reflux disease)     GERD (gastroesophageal reflux disease)     on rx    History of blood transfusion     approx 2020        History of CHF (congestive heart failure)     History of MI (myocardial infarction) 2005    thought due to a blood clot    History of ovarian cyst 1970    had oopherectomy holly    History of peritonitis 1968    due to ruptured appendix age 13    HTN (hypertension)     Hx of blood clots     right leg    Hyperlipidemia     Intestinal or peritoneal adhesions with obstruction (postoperative) (postinfection) ([address]     Kidney infection     renal failure/sepsis/spider bite    Lateral epicondylitis  of elbow     MDRO (multiple drug resistant organisms) resistance     c diff    Muscle strain     right posterior shoulder    Other abnormal glucose     PONV (postoperative nausea and vomiting)     dry heaves    Pre-diabetes     Restless legs syndrome (RLS)     Snores     no cpap    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)     TIA (transient ischemic attack) 2014    Uses walker     Vitamin D deficiency     Wears glasses     Wellness examination     last seen 2 weeks ago     Past Problem List  Patient Active Problem List   Diagnosis Code    Other specified disorders of rotator cuff syndrome of shoulder and allied disorders [id number]    Diverticulosis of large intestine K57.30    Intestinal or peritoneal adhesions with obstruction (postoperative) (postinfection) ([facility] K56.50    Restless legs syndrome (RLS) G25.81    GERD (gastroesophageal reflux disease) K21.9    Essential hypertension I10    Mixed hyperlipidemia E78.2    Other abnormal glucose R73.09    Atherosclerosis I70.90    Allergic rhinitis J30.9    Vitamin D deficiency E55.9    Depression F32. A    Degenerative joint disease (DJD) of hip M16.9    Peripheral edema R60.9    Injury of foot, left E52.595J    Facial droop R29.810    CVA (cerebral vascular accident) ([facility] I63.9    Bradycardia R00.1    Ataxia R27.0    Aphasia R47.01    TIA (transient ischemic attack) G45.9    Need for prophylactic vaccination and inoculation against cholera alone Z23    Osteopenia M85.80    Lumbago M54.50    Meralgia paresthetica of right side G57.11    Lumbar degenerative disc disease M51.36    Spondylosis of lumbar region without myelopathy or radiculopathy M47.816    Blood poisoning [id number]    Cellulitis of left lower extremity L03.116    Encounter for medication monitoring Z51.81    Deep vein thrombosis (DVT) of right lower extremity (HCC) I82.401    Chronic deep vein thrombosis (DVT) of proximal vein of both lower extremities (HCC) I82.5Y3    Chronic diastolic heart failure (HCC) I50.32    Neurogenic claudication due to lumbar spinal stenosis M48.062    Stage 3 chronic kidney disease (HCC) N18.30    Type 2 diabetes mellitus with circulatory disorder, without long-term current use of insulin (HCC) E11.59    Chronic deep vein thrombosis (DVT) of popliteal vein of right lower extremity (HCC) I82.531    Closed fracture of left wrist S62.102A    B12 deficiency E53.8    Iron deficiency anemia secondary to inadequate dietary iron intake D50.8    Closed head injury S09.90XA    Scalp laceration S01. 01XA    Abnormal finding on imaging R93.89    Other irritable bowel syndrome K58.8    Frequent falls R29.6    Double vision H53.2    Muscle soreness M79.10    Peptic ulcer K27.9 Melena K92.1    PUD (peptic ulcer disease) K27.9    Absolute anemia D64.9    Acute blood loss anemia D62    Acute renal failure (HCC) N17.9    Clostridium difficile infection A49.8    Acquired spondylolisthesis M43.10    Spinal stenosis of lumbar region with neurogenic claudication M48.062    Acute deep vein thrombosis (DVT) of proximal vein of left lower extremity (HCC) I82.4Y2    Leg swelling M79.89    Back pain M54.9    Neurological disorder G98.8    Closed unstable burst fracture of fifth lumbar vertebra with routine healing S32.052D    Slow transit constipation K59.01    Major depressive disorder, recurrent, moderate ([facility]) F33.1    Acute deep vein thrombosis (DVT) of femoral vein of left lower extremity ([facility]) I82.412    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[facility]) M48.02, G99.2    Acute deep vein thrombosis (DVT) of right femoral vein ([facility]) I82.411    S/P cervical spinal fusion Z98.1    Gait instability R26.81    Cervical myelopathy (HCC) G95.9    Cellulitis of both lower extremities L03.115, L03.116    Fracture of body of sternum, initial encounter for open fracture S22.22XB    Nondisplaced fracture of sternal end of left clavicle, initial encounter for closed fracture S42.018A    Erysipelas of right lower extremity A46    Closed fracture of body of sternum S22.22XA    Calculus of gallbladder without cholecystitis without obstruction K80.20    Leukocytosis D72.829    Type 2 diabetes mellitus with stage 3 chronic kidney disease (HCC) E11.22, N18.30    Allergy to sulfa drugs Z88.2    Bilateral cellulitis of lower leg L03.116, L03.115    Lymphedema of both lower extremities I89.0    Cerebral infarction, unspecified ([facility] I63.9    Chronic embolism and thrombosis of unspecified deep veins of proximal lower extremity, bilateral ([facility]) I82.5Y3    Other specified disorders of bone density and structure, unspecified site M85.80    Repeated falls R29.6    Fall as cause of accidental injury in home as place of occurrence, initial encounter W19. XXXA, Y92.009    Cellulitis L03.90    Anticoagulated Z79.01     SURGICAL HISTORY       Past Surgical History:   Procedure Laterality Date    ABDOMEN SURGERY  1976    benign tumor removed near remaining ovary, 1.5 pounds    APPENDECTOMY  1968    appendix ruptured, developed peritonitis    BACK SURGERY      BUNIONECTOMY Left     along with calcium deposits removed    [address] [facility] [address]  2005    negative    CERVICAL FUSION  05/21/2021    POSTERIOR C3-6 LAMINECTOMY, PARTIAL C7 LAMINECTOMY, FUSION C3-C6, SILVERCORD    CERVICAL FUSION N/A 05/21/2021    POSTERIOR C3-6 LAMINECTOMY, PARTIAL C7 LAMINECTOMY, FUSION C3-C6, SILVERCORD performed by Ronn Wan DO at [address]    12 INCHES REMOVED D/T OBSTRUCTION    COLONOSCOPY      CYST REMOVAL Right     right facial    FINGER CLOSED REDUCTION Left 08/24/2022    LEFT LITTLE FINGER CLOSED REDUCTION PINNING (Left: [place])    FINGER CLOSED REDUCTION Left 8/24/2022    CLOSED REDUCTION PINNING LEFT LITTLE FINGER performed by Tiffany Martell MD at [address] ([facility])  1973    taken as a result of recurring cysts    LUMBAR FUSION N/A 02/10/2020    LUMBAR L4-5 POSTERIOR  DECOMPRESSION INSTRUMENTATION FUSION WCEMENT AUGMENTATION/ performed by Rebeca Rudd MD at [facility] N/A 06/17/2020    L5-S1 PLIF L4-L5 REVISION performed by Rebeca Rudd MD at [address] [place] [address]  08/14/2014    FESS    OVARY REMOVAL  1970    UNILATERAL due to cyst    OVARY REMOVAL  1971    partial, due to cyst    SINUS SURGERY  2004    UPPER GASTROINTESTINAL ENDOSCOPY N/A 05/31/2019    EGD ESOPHAGOGASTRODUODENOSCOPY performed by Hermila Haskins MD at [facility] 08/05/2019    EGD BIOPSY performed by Ora Dakin, MD at [facility] 08/23/2019    EGD BIOPSY performed by Cleopatra Skinner MD at [address] Left 03/05/2019    WRIST OPEN REDUCTION INTERNAL FIXATION performed by Carie Rivas MD at [address] [place] [address]       Discharge Medication List as of 2/13/2023  5:19 PM        CONTINUE these medications which have NOT CHANGED    Details   citalopram (CELEXA) 20 MG tablet Take 1 tablet by mouth daily, Disp-30 tablet, R-0Normal      fenofibrate micronized (LOFIBRA) 134 MG capsule Take 1 capsule by mouth every morning (before breakfast), Disp-90 capsule, R-2Normal      atorvastatin (LIPITOR) 20 MG tablet Take 1 tablet by mouth nightly, Disp-30 tablet, R-3Normal      apixaban (ELIQUIS) 5 MG TABS tablet Take 1 tablet by mouth 2 times daily, Disp-60 tablet, R-0Normal      amLODIPine (NORVASC) 10 MG tablet Take 1 tablet by mouth daily, Disp-90 tablet, R-2Normal      alendronate (FOSAMAX) 70 MG tablet Take 1 tablet by mouth every 7 days Take with a full glass of water upon arising for the day. Consume on an empty stomach at least 30 minutes before the first food, beverage, or medication. Stay upright (do not lie down) for at least 30 minutes. Do not c rush or break., Disp-4 tablet, R-3Normal      gabapentin (NEURONTIN) 100 MG capsule Take 1 capsule by mouth 2 times daily for 30 days. , Disp-30 capsule, R-0Print      busPIRone (BUSPAR) 5 MG tablet Take 1 tablet by mouth 3 times daily, Disp-90 tablet, R-3Normal      pantoprazole (PROTONIX) 40 MG tablet Take 1 tablet by mouth every morning (before breakfast), Disp-90 tablet, R-3Normal      pramipexole (MIRAPEX) 0.5 MG tablet Take 1 tablet by mouth nightly, Disp-90 tablet, R-3Normal      carvedilol (COREG) 12.5 MG tablet Take 1 tablet by mouth 2 times daily, Disp-60 tablet, R-0Normal      furosemide (LASIX) 40 MG tablet Take 1 tablet by mouth daily, Disp-90 tablet, R-1Normal      fluticasone (FLONASE) 50 MCG/ACT nasal spray 1 spray by Each Nostril route daily, Disp-32 g, R-1Normal ferrous sulfate (IRON 325) 325 (65 Fe) MG tablet Take 325 mg by mouth daily (with breakfast)Historical Med      vitamin D (CHOLECALCIFEROL) 25 MCG (1000 UT) TABS tablet Take 1,000 Units by mouth at bedtimeHistorical Med      melatonin 3 MG TABS tablet Take 2 tablets by mouth nightly as needed (insomnia)OTC      cyanocobalamin ([employer] VITAMIN B12) 1000 MCG tablet Take 1 tablet by mouth daily, Disp-30 tablet, R-3Normal      Calcium Carbonate-Vitamin D 500-125 MG-UNIT TABS Take 1 tablet by mouth nightly Historical Med           ALLERGIES     is allergic to bactrim [sulfamethoxazole-trimethoprim], codeine, diazepam, meperidine hcl, and seasonal.  FAMILY HISTORY     She indicated that her mother is . She indicated that her father is . She indicated that the status of her sister is unknown. She indicated that her brother is . She indicated that her maternal grandmother is . SOCIAL HISTORY       Social History     Tobacco Use    Smoking status: Former     Packs/day: 0.50     Years: 20.00     Pack years: 10.00     Types: Cigarettes     Start date: 1995     Quit date: 2017     Years since quittin.6    Smokeless tobacco: Never   Vaping Use    Vaping Use: Never used   Substance Use Topics    Alcohol use: No     Alcohol/week: 0.0 standard drinks    Drug use: No     PHYSICAL EXAM     INITIAL VITALS: BP (!) 168/79   Pulse 78   Temp 97.6 °F (36.4 °C) (Oral)   Resp 18   Ht 5' 2\" (1.575 m)   Wt 193 lb (87.5 kg)   SpO2 96%   BMI 35.30 kg/m²    Physical Exam  Vitals and nursing note reviewed. Constitutional:       General: She is not in acute distress. Appearance: Normal appearance. She is not toxic-appearing. HENT:      Head: Normocephalic. Laceration present. Nose: Nose normal.      Mouth/Throat:      Mouth: Mucous membranes are moist.      Pharynx: Oropharynx is clear. Eyes:      General: No visual field deficit.      Extraocular Movements: Extraocular movements intact. Conjunctiva/sclera: Conjunctivae normal.      Pupils: Pupils are equal, round, and reactive to light. Cardiovascular:      Rate and Rhythm: Normal rate and regular rhythm. Pulses: Normal pulses. Radial pulses are 2+ on the right side and 2+ on the left side. Dorsalis pedis pulses are 2+ on the right side and 2+ on the left side. Heart sounds: Normal heart sounds. Pulmonary:      Effort: Pulmonary effort is normal.      Breath sounds: Normal breath sounds. Abdominal:      General: Bowel sounds are normal. There is no distension. Palpations: Abdomen is soft. Tenderness: There is no abdominal tenderness. Musculoskeletal:         General: Normal range of motion. Cervical back: Normal range of motion. No spinous process tenderness or muscular tenderness. Lumbar back: Tenderness present. Skin:     General: Skin is warm and dry. Capillary Refill: Capillary refill takes less than 2 seconds. Neurological:      General: No focal deficit present. Mental Status: She is alert and oriented to person, place, and time. GCS: GCS eye subscore is 4. GCS verbal subscore is 5. GCS motor subscore is 6. Cranial Nerves: Cranial nerves 2-12 are intact. No cranial nerve deficit, dysarthria or facial asymmetry. Sensory: Sensation is intact. No sensory deficit. Motor: Motor function is intact. No weakness. Coordination: Coordination is intact. Finger-Nose-Finger Test and Heel to [facility] Test normal.   Psychiatric:         Mood and Affect: Mood normal.         Thought Content: Thought content does not include homicidal or suicidal ideation. MEDICAL DECISION MAKING / ED COURSE:   78-year-old female presents for complaint of fall, neck and back pain.   Initial exam patient no acute distress vital stable, GCS 15, no neurodeficits, she has no significant tenderness in the cervical spine but was reporting some pain if she tries to move her head, she reports history of prior cervical fusion      1)  Number and Complexity of Problems Addressed at this Encounter  Problem List This Visit: Fall, neck, back pain    Differential Diagnosis: Fracture, contusion, scalp laceration    Diagnoses Considered but Do Not Suspect: Intracranial hemorrhage    Pertinent Comorbid Conditions: History of prior cervical fusion, currently on Eliquis      3)  Treatment and Disposition         We will check imaging, perform laceration repair    Imaging reviewed showing no acute fracture in the cervical spine, no evidence of intracranial hemorrhage, it was showing widening of the posterior disc at C6-C7 concerning for possible PLL disruption, CT lumbar spine was negative    Discussed with neurosurgery Dr. Deidre Linares, who reviewed the imaging and he does not feel that this is an acute change when compared to prior recommends patient be placed in an [place] collar and can be seen in the office next week    Patient repeat assessment: Reports feeling better after pain meds    Disposition discussion with patient/family, Shared Decision Making: Results were discussed with patient, discussed need for follow-up with neurosurgery and return precautions as well as need for staple removal in 7 days, patient voiced understanding comfortable with plan and discharge    Patient/Guardian was informed of their diagnosis and told to follow up with PCP & neurosurgery in 1-3 days. Patient demonstrates understanding and agreement with the plan. They were given the opportunity to ask questions and those questions were answered to the best of our ability with the available information. Patient/Guardian told to return to the ED for any new, worsening, changing or persistent symptoms. This dictation was prepared using WinProbe voice recognition software.        CRITICAL CARE:       PROCEDURES:    Lac Repair    Date/Time: 2/13/2023 5:37 PM  Performed by: Quang Jaffe DO  Authorized by: Thelma Markham DO     Consent:     Consent obtained:  Verbal    Consent given by:  Patient    Risks, benefits, and alternatives were discussed: yes      Risks discussed:  Infection, need for additional repair, nerve damage, pain, poor cosmetic result, poor wound healing, retained foreign body, tendon damage and vascular damage  Universal protocol:     Procedure explained and questions answered to patient or proxy's satisfaction: yes    Exploration:     Hemostasis achieved with:  Direct pressure    Wound exploration: wound explored through full range of motion and entire depth of wound visualized      Wound extent: no fascia violation noted, no foreign bodies/material noted, no muscle damage noted, no nerve damage noted, no tendon damage noted, no underlying fracture noted and no vascular damage noted      Contaminated: no    Treatment:     Area cleansed with:  Saline    Amount of cleaning:  Standard    Irrigation solution:  Sterile saline    Irrigation method:  Syringe    Visualized foreign bodies/material removed: no      Debridement:  None    Undermining:  None    Scar revision: no    Skin repair:     Repair method:  Staples    Number of staples:  5  Approximation:     Approximation:  Close  Repair type:     Repair type:  Simple  Post-procedure details:     Dressing:  Open (no dressing)    Procedure completion:  Tolerated well, no immediate complications      DATA FOR LAB AND RADIOLOGY TESTS ORDERED BELOW ARE REVIEWED BY THE ED CLINICIAN:    RADIOLOGY: All x-rays, CT, MRI, and formal ultrasound images (except ED bedside ultrasound) are read by the radiologist, see reports below, unless otherwise noted in MDM or here. Reports below are reviewed by myself. CT LUMBAR SPINE WO CONTRAST   Final Result   No acute abnormality identified         CT CSpine W/O Contrast   Final Result   Head CT:  No acute intracranial abnormality detected. Cervical Spine CT:  No acute fracture.       There is widening of the posterior disc at C6-C7.  While that could be   secondary to long-term facet instability, acute posterior longitudinal   ligament injury cannot be excluded.      Findings were discussed with CHRISTIANO MARCELINO at 4:01 pm on 2/13/2023.         CT Head W/O Contrast   Final Result   Head CT:  No acute intracranial abnormality detected.      Cervical Spine CT:  No acute fracture.      There is widening of the posterior disc at C6-C7.  While that could be   secondary to long-term facet instability, acute posterior longitudinal   ligament injury cannot be excluded.      Findings were discussed with CHRISTIANO MARCELINO at 4:01 pm on 2/13/2023.             LABS: Lab orders shown below, the results are reviewed by myself, and all abnormals are listed below.  Labs Reviewed - No data to display    Vitals Reviewed:    Vitals:    02/13/23 1411 02/13/23 1415 02/13/23 1602 02/13/23 1706   BP: (!) 191/67  (!) 168/79    Pulse: 69 67 63 78   Resp: 18  16 18   Temp: 97.6 °F (36.4 °C)      TempSrc: Oral      SpO2: 97%  97% 96%   Weight: 193 lb (87.5 kg)      Height: 5' 2\" (1.575 m)        MEDICATIONS GIVEN TO PATIENT THIS ENCOUNTER:  Orders Placed This Encounter   Medications    tetanus-diphth-acell pertussis (BOOSTRIX) injection 0.5 mL    acetaminophen (TYLENOL) tablet 1,000 mg    HYDROcodone-acetaminophen (NORCO) 5-325 MG per tablet     Sig: Take 1 tablet by mouth every 6 hours as needed for Pain for up to 3 days. Intended supply: 3 days. Take lowest dose possible to manage pain Max Daily Amount: 4 tablets     Dispense:  12 tablet     Refill:  0     DISCHARGE PRESCRIPTIONS:  Discharge Medication List as of 2/13/2023  5:19 PM        START taking these medications    Details   HYDROcodone-acetaminophen (NORCO) 5-325 MG per tablet Take 1 tablet by mouth every 6 hours as needed for Pain for up to 3 days. Intended supply: 3 days. Take lowest dose possible to manage pain Max Daily Amount: 4 tablets, Disp-12 tablet, R-0Normal        PHYSICIAN CONSULTS ORDERED THIS ENCOUNTER:  IP CONSULT TO NEUROSURGERY  FINAL IMPRESSION      1. Laceration of scalp, initial encounter    2. Fall, initial encounter    3.  Neck pain          DISPOSITION/PLAN   DISPOSITION Decision To Discharge 02/13/2023 05:02:41 PM      OUTPATIENT FOLLOW UP THE PATIENT:  Winnie Closs, MD  [place] [phone]    Schedule an appointment as soon as possible for a visit   For suture removal, For wound re-check    [facility] ED  [address]  [address] [facility]  [phone]    As needed, If symptoms worsen    Ellin Bamberger, [address]  [facility] [address] [place] [address]  [phone]    Schedule an appointment as soon as possible for a visit       Arminda Rivers, [facility],   02/14/23 1753

## 2023-02-13 NOTE — ED NOTES
Report given to RN from [address] [place] [address]. Report method by phone   The following was reviewed with receiving RN:   Current vital signs:  BP (!) 168/79   Pulse 78   Temp 97.6 °F (36.4 °C) (Oral)   Resp 18   Ht 5' 2\" (1.575 m)   Wt 193 lb (87.5 kg)   SpO2 96%   BMI 35.30 kg/m²                MEWS Score: 1     Any medication or safety alerts were reviewed. Any pending diagnostics and notifications were also reviewed, as well as any safety concerns or issues, abnormal labs, abnormal imaging, and abnormal assessment findings. Questions were answered.             Nancy Foster, MEENU  02/13/23 5799

## 2023-02-14 ENCOUNTER — HOSPITAL ENCOUNTER (OUTPATIENT)
Age: 73
Setting detail: SPECIMEN
Discharge: HOME | End: 2023-02-14

## 2023-02-14 LAB
ANION GAP SERPL CALCULATED.3IONS-SCNC: 14 MMOL/L (ref 9–17)
BUN SERPL-MCNC: 22 MG/DL (ref 8–23)
CALCIUM SERPL-MCNC: 8.9 MG/DL (ref 8.6–10.4)
CHLORIDE SERPL-SCNC: 106 MMOL/L (ref 98–107)
CO2 SERPL-SCNC: 23 MMOL/L (ref 20–31)
CREAT SERPL-MCNC: 0.92 MG/DL (ref 0.5–0.9)
EST. AVERAGE GLUCOSE BLD GHB EST-MCNC: NORMAL MG/DL
GFR SERPL CREATININE-BSD FRML MDRD: >60 ML/MIN/1.73M2
GLUCOSE SERPL-MCNC: 179 MG/DL (ref 70–99)
HBA1C MFR BLD: NORMAL % (ref 4–6)
HCT VFR BLD AUTO: 40.8 % (ref 36.3–47.1)
HGB BLD-MCNC: 12.8 G/DL (ref 11.9–15.1)
MCH RBC QN AUTO: 31 PG (ref 25.2–33.5)
MCHC RBC AUTO-ENTMCNC: 31.4 G/DL (ref 28.4–34.8)
MCV RBC AUTO: 98.8 FL (ref 82.6–102.9)
NRBC AUTOMATED: 0 PER 100 WBC
PDW BLD-RTO: 13.6 % (ref 11.8–14.4)
PLATELET # BLD AUTO: 280 K/UL (ref 138–453)
PMV BLD AUTO: 10.1 FL (ref 8.1–13.5)
POTASSIUM SERPL-SCNC: 3.8 MMOL/L (ref 3.7–5.3)
RBC # BLD: 4.13 M/UL (ref 3.95–5.11)
SODIUM SERPL-SCNC: 143 MMOL/L (ref 135–144)
WBC # BLD AUTO: 7.6 K/UL (ref 3.5–11.3)

## 2023-02-14 PROCEDURE — 83036 HEMOGLOBIN GLYCOSYLATED A1C: CPT

## 2023-02-14 PROCEDURE — 36415 COLL VENOUS BLD VENIPUNCTURE: CPT

## 2023-02-14 PROCEDURE — P9603 ONE-WAY ALLOW PRORATED MILES: HCPCS

## 2023-02-14 PROCEDURE — 80048 BASIC METABOLIC PNL TOTAL CA: CPT

## 2023-02-14 PROCEDURE — 85027 COMPLETE CBC AUTOMATED: CPT

## 2023-02-16 LAB
ESTIMATED AVERAGE GLUCOSE: 120 MG/DL
HBA1C MFR BLD: 5.8 %

## 2023-02-21 ENCOUNTER — HOSPITAL ENCOUNTER (OUTPATIENT)
Age: 73
Setting detail: SPECIMEN
Discharge: HOME | End: 2023-02-21

## 2023-02-21 LAB
ANION GAP SERPL CALCULATED.3IONS-SCNC: 10 MMOL/L (ref 9–17)
BUN SERPL-MCNC: 29 MG/DL (ref 8–23)
CALCIUM SERPL-MCNC: 9 MG/DL (ref 8.6–10.4)
CHLORIDE SERPL-SCNC: 104 MMOL/L (ref 98–107)
CO2 SERPL-SCNC: 29 MMOL/L (ref 20–31)
CREAT SERPL-MCNC: 0.91 MG/DL (ref 0.5–0.9)
EST. AVERAGE GLUCOSE BLD GHB EST-MCNC: 126 MG/DL
GFR SERPL CREATININE-BSD FRML MDRD: >60 ML/MIN/1.73M2
GLUCOSE SERPL-MCNC: 115 MG/DL (ref 70–99)
HBA1C MFR BLD: 6 % (ref 4–6)
HCT VFR BLD AUTO: 38.5 % (ref 36.3–47.1)
HGB BLD-MCNC: 12.2 G/DL (ref 11.9–15.1)
MCH RBC QN AUTO: 31.4 PG (ref 25.2–33.5)
MCHC RBC AUTO-ENTMCNC: 31.7 G/DL (ref 28.4–34.8)
MCV RBC AUTO: 99.2 FL (ref 82.6–102.9)
NRBC AUTOMATED: 0 PER 100 WBC
PDW BLD-RTO: 13.8 % (ref 11.8–14.4)
PLATELET # BLD AUTO: 280 K/UL (ref 138–453)
PMV BLD AUTO: 9.5 FL (ref 8.1–13.5)
POTASSIUM SERPL-SCNC: 3.3 MMOL/L (ref 3.7–5.3)
RBC # BLD: 3.88 M/UL (ref 3.95–5.11)
SODIUM SERPL-SCNC: 143 MMOL/L (ref 135–144)
WBC # BLD AUTO: 5.9 K/UL (ref 3.5–11.3)

## 2023-02-21 PROCEDURE — P9603 ONE-WAY ALLOW PRORATED MILES: HCPCS

## 2023-02-21 PROCEDURE — 85027 COMPLETE CBC AUTOMATED: CPT

## 2023-02-21 PROCEDURE — 80048 BASIC METABOLIC PNL TOTAL CA: CPT

## 2023-02-21 PROCEDURE — 36415 COLL VENOUS BLD VENIPUNCTURE: CPT

## 2023-02-21 PROCEDURE — 83036 HEMOGLOBIN GLYCOSYLATED A1C: CPT

## 2023-02-22 ENCOUNTER — HOSPITAL ENCOUNTER (OUTPATIENT)
Age: 73
Setting detail: SPECIMEN
Discharge: HOME | End: 2023-02-22

## 2023-02-22 LAB
ANION GAP SERPL CALCULATED.3IONS-SCNC: 12 MMOL/L (ref 9–17)
BUN SERPL-MCNC: 28 MG/DL (ref 8–23)
CALCIUM SERPL-MCNC: 8.9 MG/DL (ref 8.6–10.4)
CHLORIDE SERPL-SCNC: 108 MMOL/L (ref 98–107)
CO2 SERPL-SCNC: 27 MMOL/L (ref 20–31)
CREAT SERPL-MCNC: 0.97 MG/DL (ref 0.5–0.9)
GFR SERPL CREATININE-BSD FRML MDRD: >60 ML/MIN/1.73M2
GLUCOSE SERPL-MCNC: 99 MG/DL (ref 70–99)
HCT VFR BLD AUTO: 37.8 % (ref 36.3–47.1)
HGB BLD-MCNC: 11.9 G/DL (ref 11.9–15.1)
MCH RBC QN AUTO: 31.6 PG (ref 25.2–33.5)
MCHC RBC AUTO-ENTMCNC: 31.5 G/DL (ref 28.4–34.8)
MCV RBC AUTO: 100.5 FL (ref 82.6–102.9)
NRBC AUTOMATED: 0 PER 100 WBC
PDW BLD-RTO: 13.8 % (ref 11.8–14.4)
PLATELET # BLD AUTO: 270 K/UL (ref 138–453)
PMV BLD AUTO: 9.7 FL (ref 8.1–13.5)
POTASSIUM SERPL-SCNC: 4 MMOL/L (ref 3.7–5.3)
RBC # BLD: 3.76 M/UL (ref 3.95–5.11)
SODIUM SERPL-SCNC: 147 MMOL/L (ref 135–144)
WBC # BLD AUTO: 6.1 K/UL (ref 3.5–11.3)

## 2023-02-22 PROCEDURE — 36415 COLL VENOUS BLD VENIPUNCTURE: CPT

## 2023-02-22 PROCEDURE — 80048 BASIC METABOLIC PNL TOTAL CA: CPT

## 2023-02-22 PROCEDURE — P9603 ONE-WAY ALLOW PRORATED MILES: HCPCS

## 2023-02-22 PROCEDURE — 85027 COMPLETE CBC AUTOMATED: CPT

## 2023-02-23 ENCOUNTER — OFFICE VISIT (OUTPATIENT)
Dept: INTERNAL MEDICINE CLINIC | Age: 73
End: 2023-02-23
Payer: MEDICARE

## 2023-02-23 ENCOUNTER — HOSPITAL ENCOUNTER (OUTPATIENT)
Age: 73
Setting detail: SPECIMEN
Discharge: HOME | End: 2023-02-23

## 2023-02-23 VITALS — OXYGEN SATURATION: 95 % | HEART RATE: 66 BPM

## 2023-02-23 DIAGNOSIS — Z48.02 ENCOUNTER FOR STAPLE REMOVAL: Primary | ICD-10-CM

## 2023-02-23 LAB
ANION GAP SERPL CALCULATED.3IONS-SCNC: 12 MMOL/L (ref 9–17)
BUN SERPL-MCNC: 26 MG/DL (ref 8–23)
CALCIUM SERPL-MCNC: 8.9 MG/DL (ref 8.6–10.4)
CHLORIDE SERPL-SCNC: 103 MMOL/L (ref 98–107)
CO2 SERPL-SCNC: 28 MMOL/L (ref 20–31)
CREAT SERPL-MCNC: 0.92 MG/DL (ref 0.5–0.9)
GFR SERPL CREATININE-BSD FRML MDRD: >60 ML/MIN/1.73M2
GLUCOSE SERPL-MCNC: 95 MG/DL (ref 70–99)
POTASSIUM SERPL-SCNC: 3.7 MMOL/L (ref 3.7–5.3)
SODIUM SERPL-SCNC: 143 MMOL/L (ref 135–144)

## 2023-02-23 PROCEDURE — G8427 DOCREV CUR MEDS BY ELIG CLIN: HCPCS | Performed by: INTERNAL MEDICINE

## 2023-02-23 PROCEDURE — 99213 OFFICE O/P EST LOW 20 MIN: CPT | Performed by: INTERNAL MEDICINE

## 2023-02-23 PROCEDURE — 1090F PRES/ABSN URINE INCON ASSESS: CPT | Performed by: INTERNAL MEDICINE

## 2023-02-23 PROCEDURE — 3017F COLORECTAL CA SCREEN DOC REV: CPT | Performed by: INTERNAL MEDICINE

## 2023-02-23 PROCEDURE — P9603 ONE-WAY ALLOW PRORATED MILES: HCPCS

## 2023-02-23 PROCEDURE — 36415 COLL VENOUS BLD VENIPUNCTURE: CPT

## 2023-02-23 PROCEDURE — G8484 FLU IMMUNIZE NO ADMIN: HCPCS | Performed by: INTERNAL MEDICINE

## 2023-02-23 PROCEDURE — 80048 BASIC METABOLIC PNL TOTAL CA: CPT

## 2023-02-23 PROCEDURE — 1123F ACP DISCUSS/DSCN MKR DOCD: CPT | Performed by: INTERNAL MEDICINE

## 2023-02-23 PROCEDURE — G8417 CALC BMI ABV UP PARAM F/U: HCPCS | Performed by: INTERNAL MEDICINE

## 2023-02-23 PROCEDURE — 1036F TOBACCO NON-USER: CPT | Performed by: INTERNAL MEDICINE

## 2023-02-23 PROCEDURE — G8399 PT W/DXA RESULTS DOCUMENT: HCPCS | Performed by: INTERNAL MEDICINE

## 2023-02-23 ASSESSMENT — ENCOUNTER SYMPTOMS
GASTROINTESTINAL NEGATIVE: 1
EYES NEGATIVE: 1
RESPIRATORY NEGATIVE: 1

## 2023-02-23 NOTE — PROGRESS NOTES
Visit Information    Have you changed or started any medications since your last visit including any over-the-counter medicines, vitamins, or herbal medicines? no   Are you having any side effects from any of your medications? -  no  Have you stopped taking any of your medications? Is so, why? -  no    Have you seen any other physician or provider since your last visit? Yes - Records Obtained  Have you had any other diagnostic tests since your last visit? Yes - Records Obtained  Have you been seen in the emergency room and/or had an admission to a hospital since we last saw you? Yes - Records Obtained  Have you had your routine dental cleaning in the past 6 months? no    Have you activated your [employer] account? If not, what are your barriers?  Yes     Patient Care Team:  Luis Quiroz MD as PCP - General (Internal Medicine)  Luis Quiroz MD as PCP - Empaneled Provider    Medical History Review  Past Medical, Family, and Social History reviewed and does contribute to the patient presenting condition    Health Maintenance   Topic Date Due    Shingles vaccine (1 of 2) Never done    COVID-19 Vaccine (3 - Booster for Gonzalez Peter series) 06/03/2021    Diabetic retinal exam  07/20/2021    Flu vaccine (1) 08/01/2022    Annual Wellness Visit (AWV)  12/26/2022    Diabetic foot exam  03/12/2023    Breast cancer screen  05/06/2023    Diabetic Alb to Cr ratio (uACR) test  11/22/2023    Lipids  11/22/2023    Depression Monitoring  01/27/2024    A1C test (Diabetic or Prediabetic)  02/21/2024    GFR test (Diabetes, CKD 3-4, OR last GFR 15-59)  02/22/2024    Colorectal Cancer Screen  10/07/2026    DTaP/Tdap/Td vaccine (2 - Td or Tdap) 02/13/2033    DEXA (modify frequency per FRAX score)  Completed    Pneumococcal 65+ years Vaccine  Completed    Hepatitis C screen  Completed    Hepatitis A vaccine  Aged Out    Hib vaccine  Aged Out    Meningococcal (ACWY) vaccine  Aged Out

## 2023-02-23 NOTE — PROGRESS NOTES
[address] [facility] [address]  [place] [address]  Dept: [phone]  Dept Fax: [phone]    Travis Laws is a 68 y.o. female who presents today for her medicalconditions/complaints as noted below. [place] Relic is c/o of Suture / Staple Removal (Staples 5)      HPI:     Suture / Staple Removal  The sutures were placed 11 to 14 days ago. She tried nothing since the wound repair. The treatment provided moderate relief. The maximum temperature noted was less than 100.4 F. There has been no drainage from the wound. There is no redness present. There is no swelling present. The pain has improved. Past Medical History:   Diagnosis Date    Allergic rhinitis, cause unspecified     Back pain     lumbar    Bowel obstruction (HCC)     history of due to scar tissue, resolved non-surgically    C. difficile diarrhea     CAD (coronary artery disease)     no stent needed per pt.  Dr. Clara Raman did cath at  2005    Cardiac murmur     Cellulitis     left leg    Cellulitis 2017 August    leg left leg/bug bite    Cerebral artery occlusion with cerebral infarction [facility])     TIA 2014    COVID-19     ONE YR AGO IN 4/25/2020 fever and cough    Diverticulosis of colon (without mention of hemorrhage)     GERD (gastroesophageal reflux disease)     GERD (gastroesophageal reflux disease)     on rx    History of blood transfusion     approx 2020        History of CHF (congestive heart failure)     History of MI (myocardial infarction) 2005    thought due to a blood clot    History of ovarian cyst 1970    had oopherectomy holly    History of peritonitis 1968    due to ruptured appendix age 13    HTN (hypertension)     Hx of blood clots     right leg    Hyperlipidemia     Intestinal or peritoneal adhesions with obstruction (postoperative) (postinfection) ([facility] [address]     Kidney infection     renal failure/sepsis/spider bite    Lateral epicondylitis  of elbow     MDRO (multiple drug resistant organisms) resistance c diff    Muscle strain     right posterior shoulder    Other abnormal glucose     PONV (postoperative nausea and vomiting)     dry heaves    Pre-diabetes     Restless legs syndrome (RLS)     Snores     no cpap    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)     TIA (transient ischemic attack) 2014    Uses walker     Vitamin D deficiency     Wears glasses     Wellness examination     last seen 2 weeks ago        Current Outpatient Medications   Medication Sig Dispense Refill    citalopram (CELEXA) 20 MG tablet Take 1 tablet by mouth daily 30 tablet 0    fenofibrate micronized (LOFIBRA) 134 MG capsule Take 1 capsule by mouth every morning (before breakfast) 90 capsule 2    atorvastatin (LIPITOR) 20 MG tablet Take 1 tablet by mouth nightly 30 tablet 3    apixaban (ELIQUIS) 5 MG TABS tablet Take 1 tablet by mouth 2 times daily 60 tablet 0    amLODIPine (NORVASC) 10 MG tablet Take 1 tablet by mouth daily 90 tablet 2    alendronate (FOSAMAX) 70 MG tablet Take 1 tablet by mouth every 7 days Take with a full glass of water upon arising for the day. Consume on an empty stomach at least 30 minutes before the first food, beverage, or medication. Stay upright (do not lie down) for at least 30 minutes. Do not crush or break.  4 tablet 3    busPIRone (BUSPAR) 5 MG tablet Take 1 tablet by mouth 3 times daily 90 tablet 3    pantoprazole (PROTONIX) 40 MG tablet Take 1 tablet by mouth every morning (before breakfast) 90 tablet 3    pramipexole (MIRAPEX) 0.5 MG tablet Take 1 tablet by mouth nightly 90 tablet 3    carvedilol (COREG) 12.5 MG tablet Take 1 tablet by mouth 2 times daily 60 tablet 0    furosemide (LASIX) 40 MG tablet Take 1 tablet by mouth daily 90 tablet 1    fluticasone (FLONASE) 50 MCG/ACT nasal spray 1 spray by Each Nostril route daily 32 g 1    ferrous sulfate (IRON 325) 325 (65 Fe) MG tablet Take 325 mg by mouth daily (with breakfast)      vitamin D (CHOLECALCIFEROL) 25 MCG (1000 UT) TABS tablet Take 1,000 Units by mouth at bedtime      melatonin 3 MG TABS tablet Take 2 tablets by mouth nightly as needed (insomnia)      cyanocobalamin ([employer] VITAMIN B12) 1000 MCG tablet Take 1 tablet by mouth daily 30 tablet 3    Calcium Carbonate-Vitamin D 500-125 MG-UNIT TABS Take 1 tablet by mouth nightly       gabapentin (NEURONTIN) 100 MG capsule Take 1 capsule by mouth 2 times daily for 30 days. 30 capsule 0     No current facility-administered medications for this visit. Allergies   Allergen Reactions    Bactrim [Sulfamethoxazole-Trimethoprim] Other (See Comments)     confusion    Codeine Itching    Diazepam Other (See Comments)    Meperidine Hcl Other (See Comments)    Seasonal        Health Maintenance   Topic Date Due    Shingles vaccine (1 of 2) Never done    COVID-19 Vaccine (3 - Booster for Pfizer series) 06/03/2021    Diabetic retinal exam  07/20/2021    Flu vaccine (1) 08/01/2022    Annual Wellness Visit (AWV)  12/26/2022    Diabetic foot exam  03/12/2023    Breast cancer screen  05/06/2023    Diabetic Alb to Cr ratio (uACR) test  11/22/2023    Lipids  11/22/2023    Depression Monitoring  01/27/2024    A1C test (Diabetic or Prediabetic)  02/21/2024    GFR test (Diabetes, CKD 3-4, OR last GFR 15-59)  02/22/2024    Colorectal Cancer Screen  10/07/2026    DTaP/Tdap/Td vaccine (2 - Td or Tdap) 02/13/2033    DEXA (modify frequency per FRAX score)  Completed    Pneumococcal 65+ years Vaccine  Completed    Hepatitis C screen  Completed    Hepatitis A vaccine  Aged Out    Hib vaccine  Aged Out    Meningococcal (ACWY) vaccine  Aged Out       Subjective:      Review of Systems   Constitutional: Negative. HENT: Negative. Eyes: Negative. Respiratory: Negative. Cardiovascular: Negative. Gastrointestinal: Negative. Genitourinary: Negative. Musculoskeletal: Negative. Skin: Negative. Neurological: Negative. Psychiatric/Behavioral: Negative. All other systems reviewed and are negative.     Objective: Physical Exam  Constitutional:       Appearance: Normal appearance. She is well-developed. HENT:      Head: Normocephalic. Laceration present. Comments: 3-4 cm laceration with 5 staples. Well healed. Eyes:      Conjunctiva/sclera: Conjunctivae normal.      Pupils: Pupils are equal, round, and reactive to light. Neck:      Thyroid: No thyromegaly. Vascular: No JVD. Cardiovascular:      Rate and Rhythm: Normal rate and regular rhythm. Heart sounds: S1 normal and S2 normal. No murmur heard. Pulmonary:      Effort: No respiratory distress. Breath sounds: Normal breath sounds. Abdominal:      General: Bowel sounds are normal.      Palpations: Abdomen is soft. There is no mass. Tenderness: There is no abdominal tenderness. Musculoskeletal:         General: No tenderness. Normal range of motion. Cervical back: Neck supple. Lymphadenopathy:      Cervical: No cervical adenopathy. Skin:     General: Skin is warm and dry. Neurological:      Mental Status: She is alert and oriented to person, place, and time. Cranial Nerves: No cranial nerve deficit. Deep Tendon Reflexes: Reflexes are normal and symmetric. Psychiatric:         Behavior: Behavior normal.     Pulse 66   SpO2 95%       Assessment:       Diagnosis Orders   1. Encounter for staple removal            Plan:      No follow-ups on file. No orders of the defined types were placed in this encounter. No orders of the defined types were placed in this encounter. Patient given educational materials - see patient instructions. Discussed use, benefit, and side effects of prescribed medications. All patientquestions answered. Pt voiced understanding.     Electronically signed by Damion Prather MD on 2/23/2023at 11:07 AM

## 2023-02-24 ENCOUNTER — HOSPITAL ENCOUNTER (OUTPATIENT)
Age: 73
Setting detail: SPECIMEN
Discharge: HOME | End: 2023-02-24

## 2023-02-24 LAB
ANION GAP SERPL CALCULATED.3IONS-SCNC: 14 MMOL/L (ref 9–17)
BUN SERPL-MCNC: 29 MG/DL (ref 8–23)
CALCIUM SERPL-MCNC: 8.8 MG/DL (ref 8.6–10.4)
CHLORIDE SERPL-SCNC: 104 MMOL/L (ref 98–107)
CO2 SERPL-SCNC: 21 MMOL/L (ref 20–31)
CREAT SERPL-MCNC: 0.88 MG/DL (ref 0.5–0.9)
GFR SERPL CREATININE-BSD FRML MDRD: >60 ML/MIN/1.73M2
GLUCOSE SERPL-MCNC: 165 MG/DL (ref 70–99)
HCT VFR BLD AUTO: 37.7 % (ref 36.3–47.1)
HGB BLD-MCNC: 12.1 G/DL (ref 11.9–15.1)
MCH RBC QN AUTO: 31.4 PG (ref 25.2–33.5)
MCHC RBC AUTO-ENTMCNC: 32.1 G/DL (ref 28.4–34.8)
MCV RBC AUTO: 97.9 FL (ref 82.6–102.9)
NRBC AUTOMATED: 0 PER 100 WBC
PDW BLD-RTO: 13.7 % (ref 11.8–14.4)
PLATELET # BLD AUTO: 261 K/UL (ref 138–453)
PMV BLD AUTO: 9.8 FL (ref 8.1–13.5)
POTASSIUM SERPL-SCNC: 4.2 MMOL/L (ref 3.7–5.3)
RBC # BLD: 3.85 M/UL (ref 3.95–5.11)
SODIUM SERPL-SCNC: 139 MMOL/L (ref 135–144)
WBC # BLD AUTO: 5.6 K/UL (ref 3.5–11.3)

## 2023-02-24 PROCEDURE — P9603 ONE-WAY ALLOW PRORATED MILES: HCPCS

## 2023-02-24 PROCEDURE — 80048 BASIC METABOLIC PNL TOTAL CA: CPT

## 2023-02-24 PROCEDURE — 85027 COMPLETE CBC AUTOMATED: CPT

## 2023-02-24 PROCEDURE — 36415 COLL VENOUS BLD VENIPUNCTURE: CPT

## 2023-02-28 DIAGNOSIS — E11.59 TYPE 2 DIABETES MELLITUS WITH OTHER CIRCULATORY COMPLICATION, WITHOUT LONG-TERM CURRENT USE OF INSULIN (HCC): ICD-10-CM

## 2023-02-28 DIAGNOSIS — L29.9 ITCHING: ICD-10-CM

## 2023-02-28 RX ORDER — HYDROXYZINE HYDROCHLORIDE 25 MG/1
25 TABLET, FILM COATED ORAL EVERY 8 HOURS PRN
Qty: 30 TABLET | Refills: 0 | Status: SHIPPED | OUTPATIENT
Start: 2023-02-28 | End: 2023-03-10

## 2023-03-01 ENCOUNTER — TELEPHONE (OUTPATIENT)
Dept: INTERNAL MEDICINE CLINIC | Age: 73
End: 2023-03-01
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
## 2023-03-03 DIAGNOSIS — S01.01XA LACERATION OF SCALP, INITIAL ENCOUNTER: ICD-10-CM

## 2023-03-03 DIAGNOSIS — M54.2 NECK PAIN: ICD-10-CM

## 2023-03-03 RX ORDER — HYDROCODONE BITARTRATE AND ACETAMINOPHEN 5; 325 MG/1; MG/1
1 TABLET ORAL EVERY 6 HOURS PRN
Qty: 12 TABLET | Refills: 0 | OUTPATIENT
Start: 2023-03-03 | End: 2023-03-06

## 2023-03-03 NOTE — TELEPHONE ENCOUNTER
----- Message from Ron Lilly sent at 3/3/2023 11:36 AM EST -----  Subject: Refill Request    QUESTIONS  Name of Medication? HYDROcodone-acetaminophen (NORCO) 5-325 MG per tablet  Patient-reported dosage and instructions? takes once a day   How many days do you have left? 0  Preferred [facility]? [id number]  Pharmacy phone number (if available)? [phone]  Additional Information for Provider? out of meds need a call back. ---------------------------------------------------------------------------  --------------  Bryan RIBEIRO  What is the best way for the office to contact you? OK to leave message on   voicemail  Preferred Call Back Phone Number? [phone]  ---------------------------------------------------------------------------  --------------  SCRIPT ANSWERS  Relationship to Patient?  Self

## 2023-03-05 ENCOUNTER — APPOINTMENT (OUTPATIENT)
Dept: MRI IMAGING | Age: 73
End: 2023-03-05
Payer: MEDICARE

## 2023-03-05 ENCOUNTER — APPOINTMENT (OUTPATIENT)
Dept: CT IMAGING | Age: 73
End: 2023-03-05
Payer: MEDICARE

## 2023-03-05 ENCOUNTER — APPOINTMENT (OUTPATIENT)
Dept: GENERAL RADIOLOGY | Age: 73
End: 2023-03-05
Payer: MEDICARE

## 2023-03-05 ENCOUNTER — HOSPITAL ENCOUNTER (EMERGENCY)
Age: 73
Discharge: HOME | End: 2023-03-06
Attending: EMERGENCY MEDICINE
Payer: MEDICARE

## 2023-03-05 VITALS
WEIGHT: 190 LBS | HEART RATE: 82 BPM | DIASTOLIC BLOOD PRESSURE: 52 MMHG | RESPIRATION RATE: 15 BRPM | TEMPERATURE: 97.2 F | SYSTOLIC BLOOD PRESSURE: 135 MMHG | OXYGEN SATURATION: 92 % | BODY MASS INDEX: 34.96 KG/M2 | HEIGHT: 62 IN
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
## 2023-03-05 LAB
ABSOLUTE EOS #: 0.4 K/UL (ref 0–0.4)
ABSOLUTE LYMPH #: 2.1 K/UL (ref 1–4.8)
ABSOLUTE MONO #: 0.9 K/UL (ref 0.1–1.3)
ANION GAP SERPL CALCULATED.3IONS-SCNC: 12 MMOL/L (ref 9–17)
BASOPHILS # BLD: 1 % (ref 0–2)
BASOPHILS ABSOLUTE: 0.1 K/UL (ref 0–0.2)
BUN SERPL-MCNC: 28 MG/DL (ref 8–23)
CALCIUM SERPL-MCNC: 9.2 MG/DL (ref 8.6–10.4)
CHLORIDE SERPL-SCNC: 105 MMOL/L (ref 98–107)
CO2 SERPL-SCNC: 27 MMOL/L (ref 20–31)
CREAT SERPL-MCNC: 1 MG/DL (ref 0.5–0.9)
EOSINOPHILS RELATIVE PERCENT: 5 % (ref 0–4)
GFR SERPL CREATININE-BSD FRML MDRD: 60 ML/MIN/1.73M2
GLUCOSE SERPL-MCNC: 104 MG/DL (ref 70–99)
HCT VFR BLD AUTO: 33.9 % (ref 36–46)
HGB BLD-MCNC: 11.1 G/DL (ref 12–16)
INR PPP: 1.1
LYMPHOCYTES # BLD: 24 % (ref 24–44)
MCH RBC QN AUTO: 30.8 PG (ref 26–34)
MCHC RBC AUTO-ENTMCNC: 32.8 G/DL (ref 31–37)
MCV RBC AUTO: 94.1 FL (ref 80–100)
MONOCYTES # BLD: 11 % (ref 1–7)
PDW BLD-RTO: 13.9 % (ref 11.5–14.9)
PLATELET # BLD AUTO: 268 K/UL (ref 150–450)
PMV BLD AUTO: 7.1 FL (ref 6–12)
POTASSIUM SERPL-SCNC: 3.6 MMOL/L (ref 3.7–5.3)
PROTHROMBIN TIME: 14.2 SEC (ref 11.8–14.6)
RBC # BLD: 3.61 M/UL (ref 4–5.2)
SEG NEUTROPHILS: 59 % (ref 36–66)
SEGMENTED NEUTROPHILS ABSOLUTE COUNT: 5.2 K/UL (ref 1.3–9.1)
SODIUM SERPL-SCNC: 144 MMOL/L (ref 135–144)
WBC # BLD AUTO: 8.7 K/UL (ref 3.5–11)

## 2023-03-05 PROCEDURE — 6360000002 HC RX W HCPCS

## 2023-03-05 PROCEDURE — 70450 CT HEAD/BRAIN W/O DYE: CPT

## 2023-03-05 PROCEDURE — 72125 CT NECK SPINE W/O DYE: CPT

## 2023-03-05 PROCEDURE — 36415 COLL VENOUS BLD VENIPUNCTURE: CPT

## 2023-03-05 PROCEDURE — 72141 MRI NECK SPINE W/O DYE: CPT

## 2023-03-05 PROCEDURE — 80048 BASIC METABOLIC PNL TOTAL CA: CPT

## 2023-03-05 PROCEDURE — 85610 PROTHROMBIN TIME: CPT

## 2023-03-05 PROCEDURE — 85025 COMPLETE CBC W/AUTO DIFF WBC: CPT

## 2023-03-05 PROCEDURE — 96375 TX/PRO/DX INJ NEW DRUG ADDON: CPT

## 2023-03-05 PROCEDURE — 72146 MRI CHEST SPINE W/O DYE: CPT

## 2023-03-05 PROCEDURE — 6360000002 HC RX W HCPCS: Performed by: EMERGENCY MEDICINE

## 2023-03-05 PROCEDURE — 99284 EMERGENCY DEPT VISIT MOD MDM: CPT

## 2023-03-05 PROCEDURE — 96374 THER/PROPH/DIAG INJ IV PUSH: CPT

## 2023-03-05 PROCEDURE — 72170 X-RAY EXAM OF PELVIS: CPT

## 2023-03-05 PROCEDURE — 72148 MRI LUMBAR SPINE W/O DYE: CPT

## 2023-03-05 PROCEDURE — 72131 CT LUMBAR SPINE W/O DYE: CPT

## 2023-03-05 PROCEDURE — 70551 MRI BRAIN STEM W/O DYE: CPT

## 2023-03-05 PROCEDURE — 73502 X-RAY EXAM HIP UNI 2-3 VIEWS: CPT

## 2023-03-05 RX ORDER — LORAZEPAM 2 MG/ML
INJECTION INTRAMUSCULAR
Status: COMPLETED
Start: 2023-03-05 | End: 2023-03-05

## 2023-03-05 RX ORDER — LORAZEPAM 2 MG/ML
1 INJECTION INTRAMUSCULAR ONCE
Status: COMPLETED | OUTPATIENT
Start: 2023-03-05 | End: 2023-03-05

## 2023-03-05 RX ORDER — ONDANSETRON 2 MG/ML
4 INJECTION INTRAMUSCULAR; INTRAVENOUS ONCE
Status: COMPLETED | OUTPATIENT
Start: 2023-03-05 | End: 2023-03-05

## 2023-03-05 RX ADMIN — Medication 1 MG: at 21:05

## 2023-03-05 RX ADMIN — HYDROMORPHONE HYDROCHLORIDE 1 MG: 1 INJECTION, SOLUTION INTRAMUSCULAR; INTRAVENOUS; SUBCUTANEOUS at 20:37

## 2023-03-05 RX ADMIN — LORAZEPAM 1 MG: 2 INJECTION INTRAMUSCULAR at 21:05

## 2023-03-05 RX ADMIN — ONDANSETRON 4 MG: 2 INJECTION INTRAMUSCULAR; INTRAVENOUS at 20:37

## 2023-03-05 NOTE — ED TRIAGE NOTES
Mode of arrival (squad #, walk in, police, etc) : EMS        Chief complaint(s): fall, back and hip pain        Arrival Note (brief scenario, treatment PTA, etc). : Patient brought by EMS, patient fell yesterday while going to the bathroom and now complaining of back and hip pain. Patient on neck brace per her neurosurgeon's recommendation, recently fell 2 weeks ago hitting head. C= \"Have you ever felt that you should Cut down on your drinking? \"  No  A= \"Have people Annoyed you by criticizing your drinking? \"  No  G= \"Have you ever felt bad or Guilty about your drinking? \"  No  E= \"Have you ever had a drink as an Eye-opener first thing in the morning to steady your nerves or to help a hangover? \"  No      Deferred []      Reason for deferring: N/A    *If yes to two or more: probable alcohol abuse. *

## 2023-03-06 ENCOUNTER — TELEPHONE (OUTPATIENT)
Dept: INTERNAL MEDICINE CLINIC | Age: 73
End: 2023-03-06

## 2023-03-06 NOTE — ED NOTES
Report given to Megan Swenson from ED. Report method IN PERSON   The following was reviewed with receiving RN:   Current vital signs:  BP (!) 155/54   Pulse 73   Temp 97.2 °F (36.2 °C) (Oral)   Resp 17   Ht 5' 2\" (1.575 m)   Wt 190 lb (86.2 kg)   SpO2 95%   BMI 34.75 kg/m²                MEWS Score: 0     Any medication or safety alerts were reviewed. Any pending diagnostics and notifications were also reviewed, as well as any safety concerns or issues, abnormal labs, abnormal imaging, and abnormal assessment findings. Questions were answered.           Andi Chin RN  03/05/23 2352

## 2023-03-06 NOTE — TELEPHONE ENCOUNTER
Gerson Mohan from [address] [place] [address] called to report a fall that she had today while trying to enter the bathroom foot came up underneath her and fell on bottom , no known injuries

## 2023-03-06 NOTE — ED NOTES
Dr Marcial Serrano wants the patient monitored SP02 after Meghangozi Mateo going to MRI with patient.       Saturnino Luo, MEENU  03/05/23 2109       Karsten Davila RN  03/05/23 2111

## 2023-03-06 NOTE — ED NOTES
Pt back from MRI and X-ray. Aspen collar placed back onto patient after MRI, before X-ray. Purewick applied to patient and lights turned out for comfort.       Andres Shone, RN  03/05/23 [id number] [address], RN  03/05/23 1075

## 2023-03-06 NOTE — ED PROVIDER NOTES
ADDENDUM:        Care of this patient was assumed from Dr. Oumar Zaman    at  2123    . The patient was seen for Fall  . The patient's initial evaluation and plan have been discussed with the prior provider who initially evaluated the patient. Nursing Notes, Past Medical Hx, Past Surgical Hx, Social Hx, Allergies, and Family Hx were all reviewed. I performed a repeat evaluation of the patient and reviewed tests completed so far. Fall this evening. Neck, back, left hip pain, on anticoagulants, hit head. Had a fall two weeks ago with persistent and worsening left leg weakness. Had a c6 protrusion at that time. Plan is CT and MRI of neuroaxis    Dr. Rick Joaquin is following. Follow up imaging and discharge if negative    ED Course        MRI somewhat limited by movement but no acute pathology    I discussed with the patient she states she is feeling better has no complaints at this time    She feels safe going home and has an appointment with the neurosurgeon tomorrow    We will discharge home. She remains in an [place] collar per neurosurgery recommendations. The patient was given the following medications:  Orders Placed This Encounter   Medications    HYDROmorphone (DILAUDID) injection 1 mg    ondansetron (ZOFRAN) injection 4 mg    LORazepam (ATIVAN) injection 1 mg    LORazepam (ATIVAN) 2 MG/ML injection     Ashley RICCI: cabbrandon override       RECENT VITALS:  BP: (!) 135/52, Temp: 97.2 °F (36.2 °C), Heart Rate: 82, Resp: 15     RADIOLOGY:All plain film, CT, MRI, and formal ultrasound images (except ED bedside ultrasound) are read by the radiologist and the images and interpretations are directly viewed by the emergency physician. XR HIP LEFT (2-3 VIEWS)   Final Result   No acute finding of the pelvis or left hip. XR PELVIS (1-2 VIEWS)   Final Result   No acute finding of the pelvis or left hip. MRI LUMBAR SPINE WO CONTRAST   Final Result   1.  Postsurgical changes at L5-S1 showing stability from prior CT and no acute   abnormality. 2. Mild degenerative disc disease more proximally in the lumbar spine. MRI THORACIC SPINE WO CONTRAST   Final Result   Motion artifact limits evaluation. No acute findings in the thoracic spine. No significant spinal canal or   neural foraminal narrowing. MRI CERVICAL SPINE WO CONTRAST   Final Result   Motion artifact severely limits evaluation. No significant spinal canal or   neural foraminal stenosis within study limits. MRI BRAIN WO CONTRAST   Final Result   Motion artifact limits evaluation. No acute intracranial abnormality. CT LUMBAR SPINE WO CONTRAST   Final Result   No acute fracture or traumatic malalignment in the lumbar spine. CT CERVICAL SPINE WO CONTRAST   Final Result   No acute abnormality of the cervical spine. Stable chronic findings as above. CT HEAD WO CONTRAST   Preliminary Result   No acute intracranial abnormality. LABS: All lab results were reviewed by myself, and all abnormals are listed below. Labs Reviewed   CBC WITH AUTO DIFFERENTIAL - Abnormal; Notable for the following components:       Result Value    RBC 3.61 (*)     Hemoglobin 11.1 (*)     Hematocrit 33.9 (*)     Monocytes 11 (*)     Eosinophils % 5 (*)     All other components within normal limits   BASIC METABOLIC PANEL - Abnormal; Notable for the following components:    Glucose 104 (*)     BUN 28 (*)     Creatinine 1.00 (*)     Est, Glom Filt Rate 60 (*)     Potassium 3.6 (*)     All other components within normal limits   PROTIME-INR           Disposition   DISPOSITION:    DISPOSITION Decision To Discharge 03/06/2023 12:10:56 AM      CLINICAL IMPRESSION:  1.  Fall, initial encounter        PATIENT REFERRED TO:  Corine Elam MD  [place] [address], [address] [place] [address]  [phone]    Schedule an appointment as soon as possible for a visit       [facility] ED  [address] [facility]  [phone]    As needed    DISCHARGE MEDICATIONS:  New Prescriptions    No medications on file     The care is provided during an unprecedented national emergency due to the novel coronavirus, COVID 19.   Pj Macias MD  Attending Emergency Physician             Pj Macias MD  03/06/23 0118

## 2023-03-07 ENCOUNTER — OFFICE VISIT (OUTPATIENT)
Dept: NEUROSURGERY | Age: 73
End: 2023-03-07
Payer: MEDICARE

## 2023-03-07 ENCOUNTER — HOSPITAL ENCOUNTER (OUTPATIENT)
Age: 73
Discharge: HOME | End: 2023-03-09
Payer: MEDICARE

## 2023-03-07 ENCOUNTER — HOSPITAL ENCOUNTER (OUTPATIENT)
Dept: GENERAL RADIOLOGY | Age: 73
Discharge: HOME | End: 2023-03-09
Payer: MEDICARE

## 2023-03-07 VITALS
BODY MASS INDEX: 34.75 KG/M2 | TEMPERATURE: 98 F | HEART RATE: 71 BPM | SYSTOLIC BLOOD PRESSURE: 119 MMHG | DIASTOLIC BLOOD PRESSURE: 80 MMHG | OXYGEN SATURATION: 97 % | WEIGHT: 190 LBS | RESPIRATION RATE: 22 BRPM

## 2023-03-07 DIAGNOSIS — G99.2 STENOSIS OF CERVICAL SPINE WITH MYELOPATHY (HCC): Primary | ICD-10-CM

## 2023-03-07 DIAGNOSIS — M48.02 STENOSIS OF CERVICAL SPINE WITH MYELOPATHY (HCC): ICD-10-CM

## 2023-03-07 DIAGNOSIS — R26.81 GAIT INSTABILITY: ICD-10-CM

## 2023-03-07 DIAGNOSIS — Z98.1 HISTORY OF FUSION OF CERVICAL SPINE: ICD-10-CM

## 2023-03-07 DIAGNOSIS — M48.02 STENOSIS OF CERVICAL SPINE WITH MYELOPATHY (HCC): Primary | ICD-10-CM

## 2023-03-07 DIAGNOSIS — G99.2 STENOSIS OF CERVICAL SPINE WITH MYELOPATHY (HCC): ICD-10-CM

## 2023-03-07 PROCEDURE — 3074F SYST BP LT 130 MM HG: CPT | Performed by: NURSE PRACTITIONER

## 2023-03-07 PROCEDURE — G8484 FLU IMMUNIZE NO ADMIN: HCPCS | Performed by: NURSE PRACTITIONER

## 2023-03-07 PROCEDURE — 1090F PRES/ABSN URINE INCON ASSESS: CPT | Performed by: NURSE PRACTITIONER

## 2023-03-07 PROCEDURE — 3017F COLORECTAL CA SCREEN DOC REV: CPT | Performed by: NURSE PRACTITIONER

## 2023-03-07 PROCEDURE — 1123F ACP DISCUSS/DSCN MKR DOCD: CPT | Performed by: NURSE PRACTITIONER

## 2023-03-07 PROCEDURE — 99214 OFFICE O/P EST MOD 30 MIN: CPT | Performed by: NURSE PRACTITIONER

## 2023-03-07 PROCEDURE — G8417 CALC BMI ABV UP PARAM F/U: HCPCS | Performed by: NURSE PRACTITIONER

## 2023-03-07 PROCEDURE — 3079F DIAST BP 80-89 MM HG: CPT | Performed by: NURSE PRACTITIONER

## 2023-03-07 PROCEDURE — 1036F TOBACCO NON-USER: CPT | Performed by: NURSE PRACTITIONER

## 2023-03-07 PROCEDURE — G8427 DOCREV CUR MEDS BY ELIG CLIN: HCPCS | Performed by: NURSE PRACTITIONER

## 2023-03-07 PROCEDURE — G8399 PT W/DXA RESULTS DOCUMENT: HCPCS | Performed by: NURSE PRACTITIONER

## 2023-03-07 PROCEDURE — 72050 X-RAY EXAM NECK SPINE 4/5VWS: CPT

## 2023-03-07 NOTE — PROGRESS NOTES
[address]  [facility] [place] [address], [address] [facility] [address]  Dept: [phone]    Patient:  Maia Ojeda  YOB: 1950  Date: 3/7/23    The patient is a 68 y.o. female who presents today for consult of the following problems:     Chief Complaint   Patient presents with    Follow-up         HPI:     Maia Ojeda is a 68 y.o. female who presents to the office for follow-up of recent emergency department visit following several falls. Patient has a longstanding history significant for gait instability and frequent falls. Does have history of cervical stenosis, underwent decompression fusion several years ago. It has been approximately a year since she was last seen here in the office. Reports that initially had been discharged to [facility]. Had been doing fairly well initially at discharge, was able to ambulate with minimal assistive device use. Was able to do some household chores. Most recently has had several additional falls as well as admission to subacute rehab and nursing facilities. Had a fall in February, striking her head with neck pain. Did require staples scalp. Had cervical imaging done with some concern for progressive angulation at C6-C7. This is just below the level of previous posterior fusion. Patient was placed in cervical collar and recommended for follow-up here. Patient denies any new numbness tingling or weakness to upper or lower extremities. Still feels fairly off balance. Baseline cervical discomfort, only currently worsened due to collar. History:     Past Medical History:   Diagnosis Date    Allergic rhinitis, cause unspecified     Back pain     lumbar    Bowel obstruction (HCC)     history of due to scar tissue, resolved non-surgically    C. difficile diarrhea     CAD (coronary artery disease)     no stent needed per pt.  Dr. Yaakov Smith did cath at [facility] 2005    Cardiac murmur     Cellulitis     left leg    Cellulitis 2017 August    leg left leg/bug bite    Cerebral artery occlusion with cerebral infarction [facility])     TIA 2014    COVID-19     ONE YR AGO IN 4/25/2020 fever and cough    Diverticulosis of colon (without mention of hemorrhage)     GERD (gastroesophageal reflux disease)     GERD (gastroesophageal reflux disease)     on rx    History of blood transfusion     approx 2020        History of CHF (congestive heart failure)     History of MI (myocardial infarction) 2005    thought due to a blood clot    History of ovarian cyst 1970    had oopherectomy holly    History of peritonitis 1968    due to ruptured appendix age 13    HTN (hypertension)     Hx of blood clots     right leg    Hyperlipidemia     Intestinal or peritoneal adhesions with obstruction (postoperative) (postinfection) ([facility] [address]     Kidney infection     renal failure/sepsis/spider bite    Lateral epicondylitis  of elbow     MDRO (multiple drug resistant organisms) resistance     c diff    Muscle strain     right posterior shoulder    Other abnormal glucose     PONV (postoperative nausea and vomiting)     dry heaves    Pre-diabetes     Restless legs syndrome (RLS)     Snores     no cpap    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)     TIA (transient ischemic attack) 2014    Uses walker     Vitamin D deficiency     Wears glasses     Wellness examination     last seen 2 weeks ago     Past Surgical History:   Procedure Laterality Date    ABDOMEN SURGERY  1976    benign tumor removed near remaining ovary, 1.5 pounds    APPENDECTOMY  1968    appendix ruptured, developed peritonitis    BACK SURGERY      BUNIONECTOMY Left     along with calcium deposits removed    CARDIAC CATHETERIZATION      CARDIAC CATHETERIZATION  2005    negative    CERVICAL FUSION  05/21/2021    POSTERIOR C3-6 LAMINECTOMY, PARTIAL C7 LAMINECTOMY, FUSION C3-C6, SILVERCORD    CERVICAL FUSION N/A 05/21/2021    POSTERIOR C3-6 LAMINECTOMY, PARTIAL C7 LAMINECTOMY, FUSION C3-C6, SILVERCORD performed by Trisha Pang DO at [address] D/T OBSTRUCTION    COLONOSCOPY      CYST REMOVAL Right     right facial    FINGER CLOSED REDUCTION Left 08/24/2022    LEFT LITTLE FINGER CLOSED REDUCTION PINNING (Left: Little Finger)    FINGER CLOSED REDUCTION Left 8/24/2022    CLOSED REDUCTION PINNING LEFT LITTLE FINGER performed by Jessi Velez MD at [address] [facility])  1973    taken as a result of recurring cysts    LUMBAR FUSION N/A 02/10/2020    LUMBAR L4-5 POSTERIOR  DECOMPRESSION INSTRUMENTATION FUSION WCEMENT AUGMENTATION/ performed by Corine Elam MD at [facility] [address] N/A 06/17/2020    L5-S1 PLIF L4-L5 REVISION performed by Corine Elam MD at . [address]  08/14/2014    FESS    OVARY REMOVAL  1970    UNILATERAL due to cyst    OVARY REMOVAL  1971    partial, due to cyst    SINUS SURGERY  2004    UPPER GASTROINTESTINAL ENDOSCOPY N/A 05/31/2019    EGD ESOPHAGOGASTRODUODENOSCOPY performed by Arik Moody MD at [address] N/A 08/05/2019    EGD BIOPSY performed by Vic Sarabia MD at [address] N/A 08/23/2019    EGD BIOPSY performed by Arik Moody MD at [address] Left 03/05/2019    WRIST OPEN REDUCTION INTERNAL FIXATION performed by Sudha Mcmillan MD at [facility] History   Problem Relation Age of Onset    Stroke Mother     Diabetes Mother     Heart Disease Mother     High Blood Pressure Mother     Heart Disease Father     Heart Disease Brother     High Blood Pressure Brother     Heart Disease Maternal Grandmother     High Blood Pressure Sister      Current Outpatient Medications on File Prior to Visit   Medication Sig Dispense Refill    hydrOXYzine HCl (ATARAX) 25 MG tablet Take 1 tablet by mouth every 8 hours as needed for Itching 30 tablet 0    citalopram (CELEXA) 20 MG tablet Take 1 tablet by mouth daily 30 tablet 0    fenofibrate micronized (LOFIBRA) 134 MG capsule Take 1 capsule by mouth every morning (before breakfast) 90 capsule 2    atorvastatin (LIPITOR) 20 MG tablet Take 1 tablet by mouth nightly 30 tablet 3    apixaban (ELIQUIS) 5 MG TABS tablet Take 1 tablet by mouth 2 times daily 60 tablet 0    amLODIPine (NORVASC) 10 MG tablet Take 1 tablet by mouth daily 90 tablet 2    alendronate (FOSAMAX) 70 MG tablet Take 1 tablet by mouth every 7 days Take with a full glass of water upon arising for the day. Consume on an empty stomach at least 30 minutes before the first food, beverage, or medication. Stay upright (do not lie down) for at least 30 minutes. Do not crush or break. 4 tablet 3    busPIRone (BUSPAR) 5 MG tablet Take 1 tablet by mouth 3 times daily 90 tablet 3    pantoprazole (PROTONIX) 40 MG tablet Take 1 tablet by mouth every morning (before breakfast) 90 tablet 3    pramipexole (MIRAPEX) 0.5 MG tablet Take 1 tablet by mouth nightly 90 tablet 3    carvedilol (COREG) 12.5 MG tablet Take 1 tablet by mouth 2 times daily 60 tablet 0    furosemide (LASIX) 40 MG tablet Take 1 tablet by mouth daily 90 tablet 1    fluticasone (FLONASE) 50 MCG/ACT nasal spray 1 spray by Each Nostril route daily 32 g 1    ferrous sulfate (IRON 325) 325 (65 Fe) MG tablet Take 325 mg by mouth daily (with breakfast)      vitamin D (CHOLECALCIFEROL) 25 MCG (1000 UT) TABS tablet Take 1,000 Units by mouth at bedtime      melatonin 3 MG TABS tablet Take 2 tablets by mouth nightly as needed (insomnia)      cyanocobalamin ([employer] VITAMIN B12) 1000 MCG tablet Take 1 tablet by mouth daily 30 tablet 3    Calcium Carbonate-Vitamin D 500-125 MG-UNIT TABS Take 1 tablet by mouth nightly       gabapentin (NEURONTIN) 100 MG capsule Take 1 capsule by mouth 2 times daily for 30 days. 30 capsule 0    [DISCONTINUED] albuterol-ipratropium  (COMBIVENT RESPIMAT)  MCG/ACT AERS inhaler Inhale 1 puff into the lungs every 6 hours as needed for Wheezing or Shortness of Breath (Patient not taking: Reported on 2022) 4 g 0    [DISCONTINUED] acetaminophen (TYLENOL) 325 MG tablet Take 2 tablets by mouth every 4 hours as needed for Pain       No current facility-administered medications on file prior to visit. Social History     Tobacco Use    Smoking status: Former     Packs/day: 0.50     Years: 20.00     Pack years: 10.00     Types: Cigarettes     Start date: 1995     Quit date: 2017     Years since quittin.7    Smokeless tobacco: Never   Vaping Use    Vaping Use: Never used   Substance Use Topics    Alcohol use: No     Alcohol/week: 0.0 standard drinks    Drug use: No       Allergies   Allergen Reactions    Bactrim [Sulfamethoxazole-Trimethoprim] Other (See Comments)     confusion    Codeine Itching    Diazepam Other (See Comments)    Meperidine Hcl Other (See Comments)    Seasonal        Review of Systems  Constitutional: Negative for activity change and appetite change. HENT: Negative for ear pain and facial swelling. Eyes: Negative for discharge and itching. Respiratory: Negative for choking and chest tightness. Cardiovascular: Negative for chest pain and leg swelling. Gastrointestinal: Negative for nausea and abdominal pain. Endocrine: Negative for cold intolerance and heat intolerance. Genitourinary: Negative for frequency and flank pain. Musculoskeletal: Negative for myalgias and joint swelling. Skin: Negative for rash and wound. Allergic/Immunologic: Negative for environmental allergies and food allergies. Hematological: Negative for adenopathy. Does not bruise/bleed easily. Psychiatric/Behavioral: Negative for self-injury. The patient is not nervous/anxious.       Physical Exam:      /80   Pulse 71   Temp 98 °F (36.7 °C)   Resp 22   Wt 190 lb (86.2 kg)   SpO2 97%   BMI 34.75 kg/m² Estimated body mass index is 34.75 kg/m² as calculated from the following:    Height as of 3/5/23: 5' 2\" (1.575 m). Weight as of this encounter: 190 lb (86.2 kg). General:  Gideon Marie is a 68y.o. year old female who appears her stated age. HEENT: Normocephalic atraumatic. Neck supple. Chest: regular rate; pulses equal  Abdomen: Soft nontender nondistended. Ext: DP and PT pulses 2+, good cap refill  Neuro    Mentation  Appropriate affect  Registration intact  Orientation intact  Judgement intact to situation    Cranial Nerves:   Pupils equal and reactive to light  Extraocular motion intact  Face and shrug symmetric  Tongue midline  No dysarthria  v1-3 sensation symmetric, masseter tone symmetric  Hearing symmetric    Sensation: Grossly intact on exam    Motor  L deltoid 5/5; R deltoid 5/5  L biceps 5/5; R biceps 5/5  L triceps 5/5; R triceps 5/5  L wrist extension 5/5; R wrist extension 5/5  L intrinsics 5/5; R intrinsics 5/5     L iliopsoas 5/5 , R iliopsoas 5/5  L quadriceps 5/5; R quadriceps 5/5  L Dorsiflexion 5/5; R dorsiflexion 5/5  L Plantarflexion 5/5; R plantarflexion 5/5  L EHL 5/5; R EHL 5/5    Reflexes  L Brachioradialis 2+/4; R brachioradialis 2+/4  L Biceps 2+/4; R Biceps 2+/4  L Triceps 2+/4; R Triceps 2+/4  L Patellar 2+/4: R Patellar 2+/4  L Achilles 2+/4; R Achilles 2+/4    hoffmans L: neg  hoffmans R: neg  Clonus L: neg  Clonus R: neg  Babinski L: neg  Babinski R: neg    Currently in wheelchair      Studies Review:     CT cervical 3/5/2023:  FINDINGS:   BONES/ALIGNMENT: No acute fracture or traumatic malalignment. Reversal   cervical lordosis, unchanged. Unchanged focal kyphosis at C6-C7. C3-C6   posterior fusion with laminectomies, no signs of hardware failure. Partially   imaged healing sternal fracture. DEGENERATIVE CHANGES: Multilevel degenerative changes without significant   spinal canal narrowing within study limits, hardware limits evaluation.        SOFT TISSUES: There is no prevertebral soft tissue swelling. MRI cervical 3/5/2023:  BONES/ALIGNMENT: Reversal of cervical lordosis, unchanged. Status post C3-C6   posterior fusion with laminectomies. The vertebral body heights are   maintained. The bone marrow signal appears unremarkable. SPINAL CORD: No abnormal cord signal is seen within study limits. SOFT TISSUES: No paraspinal mass identified. C2-C3: There is no significant disc protrusion, spinal canal stenosis. Mild   neural foraminal narrowing. C3-C4: There is no significant disc protrusion, spinal canal stenosis or   neural foraminal narrowing. C4-C5: There is no significant disc protrusion, spinal canal stenosis. Mild   neural foraminal narrowing. C5-C6: There is no significant disc protrusion, spinal canal stenosis. Mild   neural foraminal narrowing. C6-C7: There is no significant disc protrusion, spinal canal stenosis or   neural foraminal narrowing. C7-T1: There is no significant disc protrusion, spinal canal stenosis or   neural foraminal narrowing. Emergency department notes reviewed    Assessment and Plan:      1. 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 Gait instability    3. History of fusion of cervical spine          Plan: Patient with longstanding history of cervical pain, gait instability, multiple falls. Does have slight progression of angulation at C6-7 when compared to previous cervical CT from 1 year ago. Recommend continuation of cervical collar at this time. Will obtain upright flexion-extension x-rays to evaluate for any dynamic instability. Will reach out to patient after reviewing x-rays for additional recommendations. Followup: Return if symptoms worsen or fail to improve. Prescriptions Ordered:  No orders of the defined types were placed in this encounter.      Orders Placed:  Orders Placed This Encounter   Procedures    XR CERVICAL SPINE (4-5 VIEWS)     Standing Status:   Future     Number of Occurrences:   1     Standing Expiration Date:   3/7/2024     Scheduling Instructions:      Please obtain upright AP; lateral views: neutral/flexion/extension     Order Specific Question:   Reason for exam:     Answer:   eval for instability at C6/7        Electronically signed by MAIK Mc CNP on 3/7/2023 at 2:03 PM    Please note that this chart was generated using voice recognition Dragon dictation software.  Although every effort was made to ensure the accuracy of this automated transcription, some errors in transcription may have occurred.

## 2023-03-08 ASSESSMENT — ENCOUNTER SYMPTOMS
VOMITING: 0
BACK PAIN: 1

## 2023-03-08 NOTE — ED PROVIDER NOTES
[address]  EMERGENCY DEPARTMENT ENCOUNTER      Pt Name: Sanju Grissom  MRN: [id number]  [place] 1950  Date of evaluation: 3/8/23      CHIEF COMPLAINT       Chief Complaint   Patient presents with    Fall     HISTORY OF PRESENT ILLNESS   HPI 68 y.o. female presents with c/o fall  and left leg weakness. Pt reports that she has chronic gait impairment. She fell on 2/13/23. She was seen in the emergency department. CT at that time showed widening of the C6-c7 posterior disc. Since that time she has had progressively worsening weakness in her left leg. This caused her to lose her balance and fall yesterday while going to the bathroom. She is having pain in her neck, low back and her left hip. REVIEW OF SYSTEMS       Review of Systems   Constitutional:  Negative for fever. Eyes:  Negative for visual disturbance. Gastrointestinal:  Negative for vomiting. Musculoskeletal:  Positive for arthralgias, back pain and neck pain. Skin:  Negative for rash. Neurological:  Positive for weakness. PAST MEDICAL HISTORY     Past Medical History:   Diagnosis Date    Allergic rhinitis, cause unspecified     Back pain     lumbar    Bowel obstruction (HCC)     history of due to scar tissue, resolved non-surgically    C. difficile diarrhea     CAD (coronary artery disease)     no stent needed per pt.  Dr. Bladimir Morris did cath at  2005    Cardiac murmur     Cellulitis     left leg    Cellulitis 2017 August    leg left leg/bug bite    Cerebral artery occlusion with cerebral infarction [facility])     TIA 2014    COVID-19     ONE YR AGO IN 4/25/2020 fever and cough    Diverticulosis of colon (without mention of hemorrhage)     GERD (gastroesophageal reflux disease)     GERD (gastroesophageal reflux disease)     on rx    History of blood transfusion     approx 2020        History of CHF (congestive heart failure)     History of MI (myocardial infarction) 2005    thought due to a blood clot    History of ovarian cyst 1970    had oopherectomy holly    History of peritonitis 1968    due to ruptured appendix age 13    HTN (hypertension)     Hx of blood clots     right leg    Hyperlipidemia     Intestinal or peritoneal adhesions with obstruction (postoperative) (postinfection) ([address]     Kidney infection     renal failure/sepsis/spider bite    Lateral epicondylitis  of elbow     MDRO (multiple drug resistant organisms) resistance     c diff    Muscle strain     right posterior shoulder    Other abnormal glucose     PONV (postoperative nausea and vomiting)     dry heaves    Pre-diabetes     Restless legs syndrome (RLS)     Snores     no cpap    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)     TIA (transient ischemic attack) 2014    Uses walker     Vitamin D deficiency     Wears glasses     Wellness examination     last seen 2 weeks ago       SURGICAL HISTORY       Past Surgical History:   Procedure Laterality Date    ABDOMEN SURGERY  1976    benign tumor removed near remaining ovary, 1.5 pounds    APPENDECTOMY  1968    appendix ruptured, developed peritonitis    BACK SURGERY      BUNIONECTOMY Left     along with calcium deposits removed    CARDIAC CATHETERIZATION      CARDIAC CATHETERIZATION  2005    negative    CERVICAL FUSION  05/21/2021    POSTERIOR C3-6 LAMINECTOMY, PARTIAL C7 LAMINECTOMY, FUSION C3-C6, SILVERCORD    CERVICAL FUSION N/A 05/21/2021    POSTERIOR C3-6 LAMINECTOMY, PARTIAL C7 LAMINECTOMY, FUSION C3-C6, SILVERCORD performed by Trisha Pang DO at [address] [place] [address]    12 INCHES REMOVED D/T OBSTRUCTION    COLONOSCOPY      CYST REMOVAL Right     right facial    FINGER CLOSED REDUCTION Left 08/24/2022    LEFT LITTLE FINGER CLOSED REDUCTION PINNING (Left: Little Finger)    FINGER CLOSED REDUCTION Left 8/24/2022    CLOSED REDUCTION PINNING LEFT LITTLE FINGER performed by Jessi Velez MD at . [address] ([facility])  1973    taken as a result of recurring cysts    LUMBAR FUSION N/A 02/10/2020    LUMBAR L4-5 POSTERIOR  DECOMPRESSION INSTRUMENTATION FUSION WCEMENT AUGMENTATION/ performed by Tobias Snyder MD at [facility] N/A 06/17/2020    L5-S1 PLIF L4-L5 REVISION performed by Tobias Snyder MD at [address] [place] [address]  08/14/2014    FESS    OVARY REMOVAL  1970    UNILATERAL due to cyst    OVARY REMOVAL  1971    partial, due to cyst    SINUS SURGERY  2004    UPPER GASTROINTESTINAL ENDOSCOPY N/A 05/31/2019    EGD ESOPHAGOGASTRODUODENOSCOPY performed by Richie Davies MD at [address] [place] [address] 08/05/2019    EGD BIOPSY performed by Palmira Benedict MD at [address] [place] [address] 08/23/2019    EGD BIOPSY performed by Richie Davies MD at [address] Left 03/05/2019    WRIST OPEN REDUCTION INTERNAL FIXATION performed by Maria Del Rosario Hale MD at [facility]       Discharge Medication List as of 3/6/2023 12:11 AM        CONTINUE these medications which have NOT CHANGED    Details   hydrOXYzine HCl (ATARAX) 25 MG tablet Take 1 tablet by mouth every 8 hours as needed for Itching, Disp-30 tablet, R-0Normal      citalopram (CELEXA) 20 MG tablet Take 1 tablet by mouth daily, Disp-30 tablet, R-0Normal      fenofibrate micronized (LOFIBRA) 134 MG capsule Take 1 capsule by mouth every morning (before breakfast), Disp-90 capsule, R-2Normal      atorvastatin (LIPITOR) 20 MG tablet Take 1 tablet by mouth nightly, Disp-30 tablet, R-3Normal      apixaban (ELIQUIS) 5 MG TABS tablet Take 1 tablet by mouth 2 times daily, Disp-60 tablet, R-0Normal      amLODIPine (NORVASC) 10 MG tablet Take 1 tablet by mouth daily, Disp-90 tablet, R-2Normal      alendronate (FOSAMAX) 70 MG tablet Take 1 tablet by mouth every 7 days Take with a full glass of water upon arising for the day.  Consume on an empty stomach at least 30 minutes before the first food, beverage, or medication. Stay upright (do not lie down) for at least 30 minutes. Do not c rush or break., Disp-4 tablet, R-3Normal      gabapentin (NEURONTIN) 100 MG capsule Take 1 capsule by mouth 2 times daily for 30 days. , Disp-30 capsule, R-0Print      busPIRone (BUSPAR) 5 MG tablet Take 1 tablet by mouth 3 times daily, Disp-90 tablet, R-3Normal      pantoprazole (PROTONIX) 40 MG tablet Take 1 tablet by mouth every morning (before breakfast), Disp-90 tablet, R-3Normal      pramipexole (MIRAPEX) 0.5 MG tablet Take 1 tablet by mouth nightly, Disp-90 tablet, R-3Normal      carvedilol (COREG) 12.5 MG tablet Take 1 tablet by mouth 2 times daily, Disp-60 tablet, R-0Normal      furosemide (LASIX) 40 MG tablet Take 1 tablet by mouth daily, Disp-90 tablet, R-1Normal      fluticasone (FLONASE) 50 MCG/ACT nasal spray 1 spray by Each Nostril route daily, Disp-32 g, R-1Normal      ferrous sulfate (IRON 325) 325 (65 Fe) MG tablet Take 325 mg by mouth daily (with breakfast)Historical Med      vitamin D (CHOLECALCIFEROL) 25 MCG (1000 UT) TABS tablet Take 1,000 Units by mouth at bedtimeHistorical Med      melatonin 3 MG TABS tablet Take 2 tablets by mouth nightly as needed (insomnia)OTC      cyanocobalamin ([employer] VITAMIN B12) 1000 MCG tablet Take 1 tablet by mouth daily, Disp-30 tablet, R-3Normal      Calcium Carbonate-Vitamin D 500-125 MG-UNIT TABS Take 1 tablet by mouth nightly Historical Med             ALLERGIES     is allergic to bactrim [sulfamethoxazole-trimethoprim], codeine, diazepam, meperidine hcl, and seasonal.    FAMILY HISTORY     She indicated that her mother is . She indicated that her father is . She indicated that the status of her sister is unknown. She indicated that her brother is . She indicated that her maternal grandmother is . SOCIAL HISTORY      reports that she quit smoking about 5 years ago. Her smoking use included cigarettes. She started smoking about 27 years ago.  She has a 10.00 pack-year smoking history. She has never used smokeless tobacco. She reports that she does not drink alcohol and does not use drugs. PHYSICAL EXAM     INITIAL VITALS: BP (!) 135/52   Pulse 82   Temp 97.2 °F (36.2 °C) (Oral)   Resp 15   Ht 5' 2\" (1.575 m)   Wt 190 lb (86.2 kg)   SpO2 92%   BMI 34.75 kg/m²   Gen: nad  Head: Normocephalic, atraumatic  Eye: Pupils equal round reactive to light, no conjunctivitis  ENT: MMM  Neck: c. Collar in place. Tenderness midline around c6  Heart: Regular rate and rhythm no murmurs  Lungs: Clear to auscultation bilaterally, no respiratory distress  Chest wall: No crepitus, no tenderness palpation  Abdomen: Soft, nontender, nondistended, with no peritoneal signs  MSK: Lumbar spine ttp. Tenderness over the left greater trochanter  Neurologic: Patient is alert and oriented x3, fluent speech  Lower extremity strength bilaterally:   Hip Flexor / iliopsoas (L2): 5/2  Knee Extensors / quadriceps (L3): 5/3  Ankle dorsiflexors / tibialis anterior (L4): 5/4  Long toe extensors  / EHL (L5): 5/4  Extremities: foot is warm and well perfused. MEDICAL DECISION MAKIN yo F presenting with fall and neck pain. She is on anticoagulation. Recent fall with signs of disc protrusion in the cervical spine. Now left leg weakness. Concern for cord compression. Ct head ordered to r/o intracranial hemorrhage. CT c-spine andlumbar spine ordered to r/o acute fracture. MRI the C,T,L spine obtained to evaluate for any cord compression or epidural hematoma given her trauma, leg weakness. Parenteral analgesics provided. Anxiolytics provided to tolerate MRI. Pt signed out to Dr. Akilah Mckinney to FU on results. Case was discussed with orthopedic surgery Dr. Fidel Michelle.      CRITICAL CARE:       Procedures      DATA FOR LAB AND RADIOLOGY TESTS ORDERED BELOW ARE REVIEWED BY THE ED CLINICIAN:    RADIOLOGY: All x-rays, CT, MRI, and formal ultrasound images (except ED bedside ultrasound) are read by the radiologist, see reports below, unless otherwise noted in MDM or here. Reports below are reviewed by myself. XR HIP LEFT (2-3 VIEWS)   Final Result   No acute finding of the pelvis or left hip. XR PELVIS (1-2 VIEWS)   Final Result   No acute finding of the pelvis or left hip. MRI LUMBAR SPINE WO CONTRAST   Final Result   1. Postsurgical changes at L5-S1 showing stability from prior CT and no acute   abnormality. 2. Mild degenerative disc disease more proximally in the lumbar spine. MRI THORACIC SPINE WO CONTRAST   Final Result   Motion artifact limits evaluation. No acute findings in the thoracic spine. No significant spinal canal or   neural foraminal narrowing. MRI CERVICAL SPINE WO CONTRAST   Final Result   Motion artifact severely limits evaluation. No significant spinal canal or   neural foraminal stenosis within study limits. MRI BRAIN WO CONTRAST   Final Result   Motion artifact limits evaluation. No acute intracranial abnormality. CT LUMBAR SPINE WO CONTRAST   Final Result   No acute fracture or traumatic malalignment in the lumbar spine. CT CERVICAL SPINE WO CONTRAST   Final Result   No acute abnormality of the cervical spine. Stable chronic findings as above. CT HEAD WO CONTRAST   Final Result   No acute intracranial abnormality. LABS: Lab orders shown below, the results are reviewed by myself, and all abnormals are listed below.   Labs Reviewed   CBC WITH AUTO DIFFERENTIAL - Abnormal; Notable for the following components:       Result Value    RBC 3.61 (*)     Hemoglobin 11.1 (*)     Hematocrit 33.9 (*)     Monocytes 11 (*)     Eosinophils % 5 (*)     All other components within normal limits   BASIC METABOLIC PANEL - Abnormal; Notable for the following components:    Glucose 104 (*)     BUN 28 (*)     Creatinine 1.00 (*)     Est, Glom Filt Rate 60 (*)     Potassium 3.6 (*)     All other components within normal limits   PROTIME-INR       Vitals Reviewed:    Vitals:    03/05/23 1928 03/05/23 1948 03/05/23 2043 03/05/23 2326   BP: (!) 157/91 (!) 191/80 (!) 153/63 (!) 135/52   Pulse: 75 74 79 82   Resp: 15 13 16 15   Temp:       TempSrc:       SpO2: 94% 97% (!) 88% 92%   Weight:       Height:         MEDICATIONS GIVEN TO PATIENT THIS ENCOUNTER:  Orders Placed This Encounter   Medications    HYDROmorphone (DILAUDID) injection 1 mg    ondansetron (ZOFRAN) injection 4 mg    LORazepam (ATIVAN) injection 1 mg    LORazepam (ATIVAN) 2 MG/ML injection     Media Hamman: cabinet override     DISCHARGE PRESCRIPTIONS:  Discharge Medication List as of 3/6/2023 12:11 AM        PHYSICIAN CONSULTS ORDERED THIS ENCOUNTER:  IP CONSULT TO ORTHOPEDIC SURGERY     FINAL IMPRESSION      1.  Fall, initial encounter          DISPOSITION/PLAN   DISPOSITION Decision To Discharge 03/06/2023 12:10:56 AM      PATIENT REFERRED TO:  José James MD  [place] [address], [address] [address]  [phone]    Schedule an appointment as soon as possible for a visit       [facility] ED  [facility] 469 [phone]    As needed      DISCHARGE MEDICATIONS:  Discharge Medication List as of 3/6/2023 12:11 AM            Randy Hays MD  Attending Emergency Physician                     Randy Hays MD  03/08/23 [id number]

## 2023-03-09 ENCOUNTER — TELEPHONE (OUTPATIENT)
Dept: NEUROSURGERY | Age: 73
End: 2023-03-09

## 2023-03-10 NOTE — TELEPHONE ENCOUNTER
Please advise that x-ray does show evidence of some abnormal movement suggesting some mild instability. Would recommend a follow-up with Dr. Jordan Hernandez in the next month or 2 for review. Continue cervical collar at this time.

## 2023-03-10 NOTE — TELEPHONE ENCOUNTER
Impression   Chronic postsurgical and degenerative changes in the cervical spine with no   acute abnormality. Dynamic flexion/extension views showing suspected pattern   of mild instability at C2-3. The findings were sent to the Radiology Results [address] at 12:04   am on 3/8/2023 to be communicated to a licensed caregiver.

## 2023-03-13 NOTE — TELEPHONE ENCOUNTER
Patient called back. Patient informed.  Appointment requested for patient to be scheduled with Dr. Natalie Foy on 05/02/ at 1pm.

## 2023-03-20 ENCOUNTER — TELEPHONE (OUTPATIENT)
Dept: INTERNAL MEDICINE CLINIC | Age: 73
End: 2023-03-20

## 2023-03-20 NOTE — TELEPHONE ENCOUNTER
Shashi from [address] [place] [address] called to report that the patient had a fall this morning. No reported injuries.

## 2023-03-21 ENCOUNTER — TELEPHONE (OUTPATIENT)
Dept: INTERNAL MEDICINE CLINIC | Age: 73
End: 2023-03-21

## 2023-03-21 DIAGNOSIS — Z86.39 HISTORY OF LOW POTASSIUM: Primary | ICD-10-CM

## 2023-03-21 NOTE — TELEPHONE ENCOUNTER
Patient called stating she needed a refill on her potassium. I did not see this on her medication list present or past. Patient states Dr. Bull Miranda just recently put her on it. Dr. Bull Miranda out of office. Please be advised if this is something the patient should be taking.

## 2023-03-22 NOTE — TELEPHONE ENCOUNTER
Called patient and notified her to go get the labs done and have them sent over to our office. Patient was agreeable and will go get that blood work done.

## 2023-03-29 ENCOUNTER — TELEPHONE (OUTPATIENT)
Dept: INTERNAL MEDICINE CLINIC | Age: 73
End: 2023-03-29

## 2023-03-29 ENCOUNTER — HOSPITAL ENCOUNTER (OUTPATIENT)
Age: 73
Setting detail: SPECIMEN
Discharge: HOME | End: 2023-03-29

## 2023-03-29 DIAGNOSIS — Z86.39 HISTORY OF LOW POTASSIUM: ICD-10-CM

## 2023-03-29 LAB
ANION GAP SERPL CALCULATED.3IONS-SCNC: 15 MMOL/L (ref 9–17)
BUN SERPL-MCNC: 38 MG/DL (ref 8–23)
CALCIUM SERPL-MCNC: 9.5 MG/DL (ref 8.6–10.4)
CHLORIDE SERPL-SCNC: 103 MMOL/L (ref 98–107)
CO2 SERPL-SCNC: 26 MMOL/L (ref 20–31)
CREAT SERPL-MCNC: 1.01 MG/DL (ref 0.5–0.9)
GFR SERPL CREATININE-BSD FRML MDRD: 59 ML/MIN/1.73M2
GLUCOSE SERPL-MCNC: 86 MG/DL (ref 70–99)
POTASSIUM SERPL-SCNC: 4.3 MMOL/L (ref 3.7–5.3)
SODIUM SERPL-SCNC: 144 MMOL/L (ref 135–144)

## 2023-03-29 NOTE — TELEPHONE ENCOUNTER
[facility] call to update that PT will continue next week for twice a week for four weeks

## 2023-03-31 NOTE — RESULT ENCOUNTER NOTE
She should continue on the current dose until she is seen in office.    I note the potassium level was low on 3/5  She is also on Lasix 40 mg daily, which can lower her potassium

## 2023-04-03 ENCOUNTER — TELEPHONE (OUTPATIENT)
Dept: INTERNAL MEDICINE CLINIC | Age: 73
End: 2023-04-03

## 2023-04-03 DIAGNOSIS — F32.A DEPRESSION, UNSPECIFIED DEPRESSION TYPE: ICD-10-CM

## 2023-04-03 DIAGNOSIS — I82.401 DEEP VEIN THROMBOSIS (DVT) OF RIGHT LOWER EXTREMITY, UNSPECIFIED CHRONICITY, UNSPECIFIED VEIN (HCC): ICD-10-CM

## 2023-04-03 RX ORDER — CITALOPRAM 20 MG/1
20 TABLET ORAL DAILY
Qty: 30 TABLET | Refills: 0 | Status: SHIPPED | OUTPATIENT
Start: 2023-04-03

## 2023-04-03 RX ORDER — HYDRALAZINE HYDROCHLORIDE 25 MG/1
25 TABLET, FILM COATED ORAL 3 TIMES DAILY
Qty: 90 TABLET | Refills: 5 | Status: SHIPPED | OUTPATIENT
Start: 2023-04-03

## 2023-04-03 NOTE — TELEPHONE ENCOUNTER
Shashi from in home PT called in stating that patient had reported two falls over the weekend, states some lower lumbar tenderness but patient would like to wait until her appt on the 11th to talk and discuss

## 2023-04-05 ENCOUNTER — OFFICE VISIT (OUTPATIENT)
Dept: PHYSICAL MEDICINE AND REHAB | Age: 73
End: 2023-04-05
Payer: MEDICARE

## 2023-04-05 VITALS
HEIGHT: 63 IN | BODY MASS INDEX: 34.73 KG/M2 | TEMPERATURE: 98.9 F | DIASTOLIC BLOOD PRESSURE: 64 MMHG | WEIGHT: 196 LBS | HEART RATE: 75 BPM | SYSTOLIC BLOOD PRESSURE: 130 MMHG

## 2023-04-05 DIAGNOSIS — M54.2 CERVICALGIA: Primary | ICD-10-CM

## 2023-04-05 DIAGNOSIS — M79.10 MYALGIA: ICD-10-CM

## 2023-04-05 PROCEDURE — G8399 PT W/DXA RESULTS DOCUMENT: HCPCS | Performed by: PHYSICAL MEDICINE & REHABILITATION

## 2023-04-05 PROCEDURE — 3017F COLORECTAL CA SCREEN DOC REV: CPT | Performed by: PHYSICAL MEDICINE & REHABILITATION

## 2023-04-05 PROCEDURE — 1036F TOBACCO NON-USER: CPT | Performed by: PHYSICAL MEDICINE & REHABILITATION

## 2023-04-05 PROCEDURE — 1090F PRES/ABSN URINE INCON ASSESS: CPT | Performed by: PHYSICAL MEDICINE & REHABILITATION

## 2023-04-05 PROCEDURE — 1123F ACP DISCUSS/DSCN MKR DOCD: CPT | Performed by: PHYSICAL MEDICINE & REHABILITATION

## 2023-04-05 PROCEDURE — G8417 CALC BMI ABV UP PARAM F/U: HCPCS | Performed by: PHYSICAL MEDICINE & REHABILITATION

## 2023-04-05 PROCEDURE — 3075F SYST BP GE 130 - 139MM HG: CPT | Performed by: PHYSICAL MEDICINE & REHABILITATION

## 2023-04-05 PROCEDURE — 3078F DIAST BP <80 MM HG: CPT | Performed by: PHYSICAL MEDICINE & REHABILITATION

## 2023-04-05 PROCEDURE — G8427 DOCREV CUR MEDS BY ELIG CLIN: HCPCS | Performed by: PHYSICAL MEDICINE & REHABILITATION

## 2023-04-05 PROCEDURE — 99214 OFFICE O/P EST MOD 30 MIN: CPT | Performed by: PHYSICAL MEDICINE & REHABILITATION

## 2023-04-05 NOTE — PROGRESS NOTES
cyst 1970    had oopherectomy holly    History of peritonitis 1968    due to ruptured appendix age 13    HTN (hypertension)     Hx of blood clots     right leg    Hyperlipidemia     Intestinal or peritoneal adhesions with obstruction (postoperative) (postinfection) ([address]     Kidney infection     renal failure/sepsis/spider bite    Lateral epicondylitis  of elbow     MDRO (multiple drug resistant organisms) resistance     c diff    Muscle strain     right posterior shoulder    Other abnormal glucose     PONV (postoperative nausea and vomiting)     dry heaves    Pre-diabetes     Restless legs syndrome (RLS)     Snores     no cpap    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)     TIA (transient ischemic attack) 2014    Uses walker     Vitamin D deficiency     Wears glasses     Wellness examination     last seen 2 weeks ago      Past Surgical History:   Procedure Laterality Date    ABDOMEN SURGERY  1976    benign tumor removed near remaining ovary, 1.5 pounds    APPENDECTOMY  1968    appendix ruptured, developed peritonitis    BACK SURGERY      BUNIONECTOMY Left     along with calcium deposits removed    CARDIAC CATHETERIZATION      CARDIAC CATHETERIZATION  2005    negative    CERVICAL FUSION  05/21/2021    POSTERIOR C3-6 LAMINECTOMY, PARTIAL C7 LAMINECTOMY, FUSION C3-C6, SILVERCORD    CERVICAL FUSION N/A 05/21/2021    POSTERIOR C3-6 LAMINECTOMY, PARTIAL C7 LAMINECTOMY, FUSION C3-C6, SILVERCORD performed by Gareth Estrella DO at [address] [place] [address]    12 INCHES REMOVED D/T OBSTRUCTION    COLONOSCOPY      CYST REMOVAL Right     right facial    FINGER CLOSED REDUCTION Left 08/24/2022    LEFT LITTLE FINGER CLOSED REDUCTION PINNING (Left: Little Finger)    FINGER CLOSED REDUCTION Left 8/24/2022    CLOSED REDUCTION PINNING LEFT LITTLE FINGER performed by Ofelia Blevins MD at . [address] ([facility])  1973    taken as a result of recurring cysts    LUMBAR FUSION N/A 02/10/2020

## 2023-04-11 ENCOUNTER — HOSPITAL ENCOUNTER (INPATIENT)
Age: 73
LOS: 3 days | Discharge: NURSING, REHAB OR LONG-TERM CARE | DRG: 291 | End: 2023-04-14
Attending: EMERGENCY MEDICINE | Admitting: INTERNAL MEDICINE
Payer: MEDICARE

## 2023-04-11 ENCOUNTER — APPOINTMENT (OUTPATIENT)
Dept: GENERAL RADIOLOGY | Age: 73
DRG: 291 | End: 2023-04-11
Payer: MEDICARE

## 2023-04-11 DIAGNOSIS — R22.43 LOCALIZED SWELLING OF BOTH LOWER LEGS: ICD-10-CM

## 2023-04-11 DIAGNOSIS — E87.70 HYPERVOLEMIA, UNSPECIFIED HYPERVOLEMIA TYPE: Primary | ICD-10-CM

## 2023-04-11 DIAGNOSIS — I50.32 CHRONIC DIASTOLIC HEART FAILURE (HCC): ICD-10-CM

## 2023-04-11 PROBLEM — R73.03 PREDIABETES: Status: ACTIVE | Noted: 2023-04-11

## 2023-04-11 PROBLEM — Z86.73 HISTORY OF CVA (CEREBROVASCULAR ACCIDENT) WITHOUT RESIDUAL DEFICITS: Status: RESOLVED | Noted: 2023-04-11 | Resolved: 2023-04-11

## 2023-04-11 PROBLEM — I69.30 HISTORY OF CVA WITH RESIDUAL DEFICIT: Status: ACTIVE | Noted: 2023-04-11

## 2023-04-11 PROBLEM — Z86.718 HISTORY OF DVT (DEEP VEIN THROMBOSIS): Status: ACTIVE | Noted: 2023-04-11

## 2023-04-11 PROBLEM — G72.9 CERVICAL MYOPATHY: Status: ACTIVE | Noted: 2023-04-11

## 2023-04-11 PROBLEM — Z86.73 HISTORY OF CVA (CEREBROVASCULAR ACCIDENT) WITHOUT RESIDUAL DEFICITS: Status: ACTIVE | Noted: 2023-04-11

## 2023-04-11 PROBLEM — I50.33 ACUTE ON CHRONIC DIASTOLIC (CONGESTIVE) HEART FAILURE (HCC): Status: ACTIVE | Noted: 2023-04-11

## 2023-04-11 LAB
ABSOLUTE EOS #: 0.4 K/UL (ref 0–0.4)
ABSOLUTE LYMPH #: 1.6 K/UL (ref 1–4.8)
ABSOLUTE MONO #: 0.6 K/UL (ref 0.1–1.3)
ALBUMIN SERPL-MCNC: 4.2 G/DL (ref 3.5–5.2)
ALP SERPL-CCNC: 55 U/L (ref 35–104)
ALT SERPL-CCNC: 15 U/L (ref 5–33)
ANION GAP SERPL CALCULATED.3IONS-SCNC: 10 MMOL/L (ref 9–17)
AST SERPL-CCNC: 17 U/L
BACTERIA: NORMAL
BASOPHILS # BLD: 1 % (ref 0–2)
BASOPHILS ABSOLUTE: 0.1 K/UL (ref 0–0.2)
BILIRUB SERPL-MCNC: 0.3 MG/DL (ref 0.3–1.2)
BILIRUBIN URINE: NEGATIVE
BNP SERPL-MCNC: 128 PG/ML
BUN SERPL-MCNC: 37 MG/DL (ref 8–23)
CALCIUM SERPL-MCNC: 9.2 MG/DL (ref 8.6–10.4)
CASTS UA: NORMAL /LPF
CHLORIDE SERPL-SCNC: 104 MMOL/L (ref 98–107)
CO2 SERPL-SCNC: 26 MMOL/L (ref 20–31)
COLOR: YELLOW
CREAT SERPL-MCNC: 1 MG/DL (ref 0.5–0.9)
EKG ATRIAL RATE: 67 BPM
EKG P AXIS: 61 DEGREES
EKG P-R INTERVAL: 164 MS
EKG Q-T INTERVAL: 410 MS
EKG QRS DURATION: 82 MS
EKG QTC CALCULATION (BAZETT): 433 MS
EKG R AXIS: 33 DEGREES
EKG T AXIS: 36 DEGREES
EKG VENTRICULAR RATE: 67 BPM
EOSINOPHILS RELATIVE PERCENT: 7 % (ref 0–4)
EPITHELIAL CELLS UA: NORMAL /HPF
GFR SERPL CREATININE-BSD FRML MDRD: 60 ML/MIN/1.73M2
GLUCOSE BLD-MCNC: 217 MG/DL (ref 65–105)
GLUCOSE SERPL-MCNC: 105 MG/DL (ref 70–99)
GLUCOSE UR STRIP.AUTO-MCNC: NEGATIVE MG/DL
HCT VFR BLD AUTO: 37.7 % (ref 36–46)
HGB BLD-MCNC: 12.9 G/DL (ref 12–16)
KETONES UR STRIP.AUTO-MCNC: NEGATIVE MG/DL
LEUKOCYTE ESTERASE UR QL STRIP.AUTO: ABNORMAL
LYMPHOCYTES # BLD: 24 % (ref 24–44)
MCH RBC QN AUTO: 31.5 PG (ref 26–34)
MCHC RBC AUTO-ENTMCNC: 34.1 G/DL (ref 31–37)
MCV RBC AUTO: 92.5 FL (ref 80–100)
MONOCYTES # BLD: 9 % (ref 1–7)
NITRITE UR QL STRIP.AUTO: NEGATIVE
PDW BLD-RTO: 13.4 % (ref 11.5–14.9)
PLATELET # BLD AUTO: 265 K/UL (ref 150–450)
PMV BLD AUTO: 6.9 FL (ref 6–12)
POTASSIUM SERPL-SCNC: 4 MMOL/L (ref 3.7–5.3)
PROT SERPL-MCNC: 7.1 G/DL (ref 6.4–8.3)
PROT UR STRIP.AUTO-MCNC: 6.5 MG/DL (ref 5–8)
PROT UR STRIP.AUTO-MCNC: NEGATIVE MG/DL
RBC # BLD: 4.08 M/UL (ref 4–5.2)
RBC CLUMPS #/AREA URNS AUTO: NORMAL /HPF
SEG NEUTROPHILS: 59 % (ref 36–66)
SEGMENTED NEUTROPHILS ABSOLUTE COUNT: 3.9 K/UL (ref 1.3–9.1)
SODIUM SERPL-SCNC: 140 MMOL/L (ref 135–144)
SPECIFIC GRAVITY UA: 1.01 (ref 1–1.03)
TROPONIN I SERPL DL<=0.01 NG/ML-MCNC: 19 NG/L (ref 0–14)
TURBIDITY: CLEAR
URINE HGB: NEGATIVE
UROBILINOGEN, URINE: NORMAL
WBC # BLD AUTO: 6.7 K/UL (ref 3.5–11)
WBC UA: NORMAL /HPF

## 2023-04-11 PROCEDURE — 84484 ASSAY OF TROPONIN QUANT: CPT

## 2023-04-11 PROCEDURE — 82947 ASSAY GLUCOSE BLOOD QUANT: CPT

## 2023-04-11 PROCEDURE — 6370000000 HC RX 637 (ALT 250 FOR IP): Performed by: INTERNAL MEDICINE

## 2023-04-11 PROCEDURE — 93005 ELECTROCARDIOGRAM TRACING: CPT | Performed by: EMERGENCY MEDICINE

## 2023-04-11 PROCEDURE — 83880 ASSAY OF NATRIURETIC PEPTIDE: CPT

## 2023-04-11 PROCEDURE — 80053 COMPREHEN METABOLIC PANEL: CPT

## 2023-04-11 PROCEDURE — 85025 COMPLETE CBC W/AUTO DIFF WBC: CPT

## 2023-04-11 PROCEDURE — 99285 EMERGENCY DEPT VISIT HI MDM: CPT

## 2023-04-11 PROCEDURE — 71045 X-RAY EXAM CHEST 1 VIEW: CPT

## 2023-04-11 PROCEDURE — 36415 COLL VENOUS BLD VENIPUNCTURE: CPT

## 2023-04-11 PROCEDURE — 1200000000 HC SEMI PRIVATE

## 2023-04-11 PROCEDURE — 96374 THER/PROPH/DIAG INJ IV PUSH: CPT

## 2023-04-11 PROCEDURE — 93010 ELECTROCARDIOGRAM REPORT: CPT | Performed by: INTERNAL MEDICINE

## 2023-04-11 PROCEDURE — 6370000000 HC RX 637 (ALT 250 FOR IP)

## 2023-04-11 PROCEDURE — 6360000002 HC RX W HCPCS: Performed by: EMERGENCY MEDICINE

## 2023-04-11 PROCEDURE — 2580000003 HC RX 258: Performed by: INTERNAL MEDICINE

## 2023-04-11 PROCEDURE — 81001 URINALYSIS AUTO W/SCOPE: CPT

## 2023-04-11 PROCEDURE — 6370000000 HC RX 637 (ALT 250 FOR IP): Performed by: NURSE PRACTITIONER

## 2023-04-11 RX ORDER — ACETAMINOPHEN 650 MG/1
650 SUPPOSITORY RECTAL EVERY 6 HOURS PRN
Status: DISCONTINUED | OUTPATIENT
Start: 2023-04-11 | End: 2023-04-12

## 2023-04-11 RX ORDER — LANOLIN ALCOHOL/MO/W.PET/CERES
6 CREAM (GRAM) TOPICAL NIGHTLY PRN
Status: DISCONTINUED | OUTPATIENT
Start: 2023-04-11 | End: 2023-04-14 | Stop reason: HOSPADM

## 2023-04-11 RX ORDER — CARVEDILOL 12.5 MG/1
12.5 TABLET ORAL 2 TIMES DAILY
Status: DISCONTINUED | OUTPATIENT
Start: 2023-04-11 | End: 2023-04-14 | Stop reason: HOSPADM

## 2023-04-11 RX ORDER — FUROSEMIDE 10 MG/ML
40 INJECTION INTRAMUSCULAR; INTRAVENOUS ONCE
Status: COMPLETED | OUTPATIENT
Start: 2023-04-11 | End: 2023-04-11

## 2023-04-11 RX ORDER — ACETAMINOPHEN 325 MG/1
650 TABLET ORAL EVERY 6 HOURS PRN
Status: DISCONTINUED | OUTPATIENT
Start: 2023-04-11 | End: 2023-04-12

## 2023-04-11 RX ORDER — MAGNESIUM SULFATE 1 G/100ML
1000 INJECTION INTRAVENOUS PRN
Status: DISCONTINUED | OUTPATIENT
Start: 2023-04-11 | End: 2023-04-14 | Stop reason: HOSPADM

## 2023-04-11 RX ORDER — FENTANYL CITRATE 0.05 MG/ML
50 INJECTION, SOLUTION INTRAMUSCULAR; INTRAVENOUS ONCE
Status: COMPLETED | OUTPATIENT
Start: 2023-04-11 | End: 2023-04-11

## 2023-04-11 RX ORDER — FERROUS SULFATE 325(65) MG
325 TABLET ORAL
Status: DISCONTINUED | OUTPATIENT
Start: 2023-04-12 | End: 2023-04-14 | Stop reason: HOSPADM

## 2023-04-11 RX ORDER — MAGNESIUM SULFATE HEPTAHYDRATE 40 MG/ML
2000 INJECTION, SOLUTION INTRAVENOUS PRN
Status: DISCONTINUED | OUTPATIENT
Start: 2023-04-11 | End: 2023-04-11 | Stop reason: SDUPTHER

## 2023-04-11 RX ORDER — BUSPIRONE HYDROCHLORIDE 5 MG/1
5 TABLET ORAL 3 TIMES DAILY
Status: DISCONTINUED | OUTPATIENT
Start: 2023-04-11 | End: 2023-04-14 | Stop reason: HOSPADM

## 2023-04-11 RX ORDER — SODIUM CHLORIDE 0.9 % (FLUSH) 0.9 %
10 SYRINGE (ML) INJECTION PRN
Status: DISCONTINUED | OUTPATIENT
Start: 2023-04-11 | End: 2023-04-14 | Stop reason: HOSPADM

## 2023-04-11 RX ORDER — CITALOPRAM 20 MG/1
20 TABLET ORAL DAILY
Status: DISCONTINUED | OUTPATIENT
Start: 2023-04-11 | End: 2023-04-14 | Stop reason: HOSPADM

## 2023-04-11 RX ORDER — DEXTROSE MONOHYDRATE 100 MG/ML
INJECTION, SOLUTION INTRAVENOUS CONTINUOUS PRN
Status: DISCONTINUED | OUTPATIENT
Start: 2023-04-11 | End: 2023-04-14 | Stop reason: HOSPADM

## 2023-04-11 RX ORDER — FENOFIBRATE 160 MG/1
160 TABLET ORAL DAILY
Status: DISCONTINUED | OUTPATIENT
Start: 2023-04-12 | End: 2023-04-14 | Stop reason: HOSPADM

## 2023-04-11 RX ORDER — POTASSIUM CHLORIDE 7.45 MG/ML
10 INJECTION INTRAVENOUS PRN
Status: DISCONTINUED | OUTPATIENT
Start: 2023-04-11 | End: 2023-04-14 | Stop reason: HOSPADM

## 2023-04-11 RX ORDER — SODIUM CHLORIDE 9 MG/ML
INJECTION, SOLUTION INTRAVENOUS PRN
Status: DISCONTINUED | OUTPATIENT
Start: 2023-04-11 | End: 2023-04-14 | Stop reason: HOSPADM

## 2023-04-11 RX ORDER — POTASSIUM CHLORIDE 20 MEQ/1
40 TABLET, EXTENDED RELEASE ORAL PRN
Status: DISCONTINUED | OUTPATIENT
Start: 2023-04-11 | End: 2023-04-14 | Stop reason: HOSPADM

## 2023-04-11 RX ORDER — LANOLIN ALCOHOL/MO/W.PET/CERES
6 CREAM (GRAM) TOPICAL NIGHTLY PRN
Status: DISCONTINUED | OUTPATIENT
Start: 2023-04-12 | End: 2023-04-11

## 2023-04-11 RX ORDER — ONDANSETRON 2 MG/ML
4 INJECTION INTRAMUSCULAR; INTRAVENOUS EVERY 6 HOURS PRN
Status: DISCONTINUED | OUTPATIENT
Start: 2023-04-11 | End: 2023-04-14 | Stop reason: HOSPADM

## 2023-04-11 RX ORDER — PRAMIPEXOLE DIHYDROCHLORIDE 0.25 MG/1
0.5 TABLET ORAL NIGHTLY
Status: DISCONTINUED | OUTPATIENT
Start: 2023-04-11 | End: 2023-04-14 | Stop reason: HOSPADM

## 2023-04-11 RX ORDER — ATORVASTATIN CALCIUM 20 MG/1
20 TABLET, FILM COATED ORAL NIGHTLY
Status: DISCONTINUED | OUTPATIENT
Start: 2023-04-11 | End: 2023-04-14 | Stop reason: HOSPADM

## 2023-04-11 RX ORDER — POLYETHYLENE GLYCOL 3350 17 G/17G
17 POWDER, FOR SOLUTION ORAL DAILY PRN
Status: DISCONTINUED | OUTPATIENT
Start: 2023-04-11 | End: 2023-04-14 | Stop reason: HOSPADM

## 2023-04-11 RX ORDER — SODIUM CHLORIDE 0.9 % (FLUSH) 0.9 %
5-40 SYRINGE (ML) INJECTION EVERY 12 HOURS SCHEDULED
Status: DISCONTINUED | OUTPATIENT
Start: 2023-04-11 | End: 2023-04-14 | Stop reason: HOSPADM

## 2023-04-11 RX ORDER — PANTOPRAZOLE SODIUM 40 MG/1
40 TABLET, DELAYED RELEASE ORAL
Status: DISCONTINUED | OUTPATIENT
Start: 2023-04-12 | End: 2023-04-14 | Stop reason: HOSPADM

## 2023-04-11 RX ORDER — ENOXAPARIN SODIUM 100 MG/ML
40 INJECTION SUBCUTANEOUS EVERY EVENING
Status: DISCONTINUED | OUTPATIENT
Start: 2023-04-11 | End: 2023-04-11

## 2023-04-11 RX ORDER — ONDANSETRON 4 MG/1
4 TABLET, ORALLY DISINTEGRATING ORAL EVERY 8 HOURS PRN
Status: DISCONTINUED | OUTPATIENT
Start: 2023-04-11 | End: 2023-04-14 | Stop reason: HOSPADM

## 2023-04-11 RX ORDER — FUROSEMIDE 10 MG/ML
40 INJECTION INTRAMUSCULAR; INTRAVENOUS ONCE
Status: COMPLETED | OUTPATIENT
Start: 2023-04-12 | End: 2023-04-12

## 2023-04-11 RX ORDER — GABAPENTIN 100 MG/1
100 CAPSULE ORAL 2 TIMES DAILY
Status: DISCONTINUED | OUTPATIENT
Start: 2023-04-11 | End: 2023-04-14 | Stop reason: HOSPADM

## 2023-04-11 RX ADMIN — GABAPENTIN 100 MG: 100 CAPSULE ORAL at 20:18

## 2023-04-11 RX ADMIN — SODIUM CHLORIDE, PRESERVATIVE FREE 10 ML: 5 INJECTION INTRAVENOUS at 20:18

## 2023-04-11 RX ADMIN — CITALOPRAM HYDROBROMIDE 20 MG: 20 TABLET ORAL at 17:10

## 2023-04-11 RX ADMIN — BUSPIRONE HYDROCHLORIDE 5 MG: 5 TABLET ORAL at 20:18

## 2023-04-11 RX ADMIN — ACETAMINOPHEN 650 MG: 325 TABLET ORAL at 22:47

## 2023-04-11 RX ADMIN — Medication 6 MG: at 23:13

## 2023-04-11 RX ADMIN — FUROSEMIDE 40 MG: 10 INJECTION, SOLUTION INTRAMUSCULAR; INTRAVENOUS at 14:45

## 2023-04-11 RX ADMIN — BUSPIRONE HYDROCHLORIDE 5 MG: 5 TABLET ORAL at 17:10

## 2023-04-11 RX ADMIN — APIXABAN 5 MG: 5 TABLET, FILM COATED ORAL at 20:18

## 2023-04-11 RX ADMIN — CARVEDILOL 12.5 MG: 12.5 TABLET, FILM COATED ORAL at 20:18

## 2023-04-11 RX ADMIN — ATORVASTATIN CALCIUM 20 MG: 20 TABLET, FILM COATED ORAL at 20:18

## 2023-04-11 RX ADMIN — FENTANYL CITRATE 50 MCG: 0.05 INJECTION, SOLUTION INTRAMUSCULAR; INTRAVENOUS at 12:45

## 2023-04-11 RX ADMIN — PRAMIPEXOLE DIHYDROCHLORIDE 0.5 MG: 0.25 TABLET ORAL at 20:38

## 2023-04-11 RX ADMIN — ACETAMINOPHEN 650 MG: 325 TABLET ORAL at 16:38

## 2023-04-11 ASSESSMENT — PAIN - FUNCTIONAL ASSESSMENT: PAIN_FUNCTIONAL_ASSESSMENT: 0-10

## 2023-04-11 ASSESSMENT — PAIN SCALES - GENERAL
PAINLEVEL_OUTOF10: 5
PAINLEVEL_OUTOF10: 6

## 2023-04-11 ASSESSMENT — ENCOUNTER SYMPTOMS
SINUS PAIN: 0
COUGH: 1
SINUS PRESSURE: 0
WHEEZING: 0
CHOKING: 0
COLOR CHANGE: 0
EYES NEGATIVE: 1
SORE THROAT: 0
CHEST TIGHTNESS: 0
SHORTNESS OF BREATH: 1

## 2023-04-11 ASSESSMENT — PAIN DESCRIPTION - LOCATION
LOCATION: OTHER (COMMENT)
LOCATION: NECK

## 2023-04-11 ASSESSMENT — PAIN DESCRIPTION - ORIENTATION: ORIENTATION: OTHER (COMMENT)

## 2023-04-11 NOTE — ED NOTES
Report given to Jessica Roth from Med surg. Report method by phone   The following was reviewed with receiving RN:   Current vital signs:  /68   Pulse 71   Temp 98.1 °F (36.7 °C)   Resp 16   Ht 5' 3\" (1.6 m)   Wt 195 lb (88.5 kg)   SpO2 95%   BMI 34.54 kg/m²                MEWS Score: 2     Any medication or safety alerts were reviewed. Any pending diagnostics and notifications were also reviewed, as well as any safety concerns or issues, abnormal labs, abnormal imaging, and abnormal assessment findings. Questions were answered.           Baljit Chavarria  04/11/23 8852

## 2023-04-11 NOTE — ED TRIAGE NOTES
Mode of arrival (squad #, walk in, police, etc) : walk-in        Chief complaint(s): leg swelling, SOB on exertion, weakness        Arrival Note (brief scenario, treatment PTA, etc). : Patient was sent here by PCP for admission. Patient is very weak, SOB on exertion, and has had leg swelling x 3 weeks. Patient has gained 12 lbs in 1 month. C= \"Have you ever felt that you should Cut down on your drinking? \"  No  A= \"Have people Annoyed you by criticizing your drinking? \"  No  G= \"Have you ever felt bad or Guilty about your drinking? \"  No  E= \"Have you ever had a drink as an Eye-opener first thing in the morning to steady your nerves or to help a hangover? \"  No      Deferred []      Reason for deferring: N/A    *If yes to two or more: probable alcohol abuse. *

## 2023-04-11 NOTE — DISCHARGE INSTR - COC
Continuity of Care Form    Patient Name: Keira Lubin   :  1950  MRN:  [id number]    Admit date:  2023  Discharge date:  23    Code Status Order: Full Code   Advance Directives:     Admitting Physician:  Darren Medellin MD  PCP: Nicole Jang MD    Discharging Nurse: [facility] Unit/Room#:   Discharging Unit Phone Number: [phone]    Emergency Contact:   Extended Emergency Contact Information  Primary Emergency Contact: Una Hollis  Address: [address] [place] [address] [address], [address] Phone: [phone]  Mobile Phone: [phone]  Relation: Spouse  Hearing or visual needs: None  Other needs: None  Preferred language: [place]   needed?  No  Secondary Emergency Contact: Andreina Ronquillo  [place] Phone: [phone]  Mobile Phone: [phone]  Relation: Child    Past Surgical History:  Past Surgical History:   Procedure Laterality Date    ABDOMEN SURGERY      benign tumor removed near remaining ovary, 1.5 pounds    APPENDECTOMY      appendix ruptured, developed peritonitis    BACK SURGERY      BUNIONECTOMY Left     along with calcium deposits removed    [address] [facility]  2005    negative    CERVICAL FUSION  2021    POSTERIOR C3-6 LAMINECTOMY, PARTIAL C7 LAMINECTOMY, FUSION C3-C6, SILVERCORD    CERVICAL FUSION N/A 2021    POSTERIOR C3-6 LAMINECTOMY, PARTIAL C7 LAMINECTOMY, FUSION C3-C6, SILVERCORD performed by Neena Andre DO at [address] [place] [address]    12 INCHES REMOVED D/T OBSTRUCTION    COLONOSCOPY      CYST REMOVAL Right     right facial    FINGER CLOSED REDUCTION Left 2022    LEFT LITTLE FINGER CLOSED REDUCTION PINNING (Left: Little Finger)    FINGER CLOSED REDUCTION Left 2022    CLOSED REDUCTION PINNING LEFT LITTLE FINGER performed by Tara Adams MD at . [facility] ([address] [place] [address])      taken as a result

## 2023-04-11 NOTE — H&P
[address] [facility] [address] [facility] [address]      [facility]     HISTORY AND PHYSICAL EXAMINATION            Date:   4/11/2023  Patient name:  Geoffrey Cartwright  Date of admission:  4/11/2023 11:42 AM  MRN:   [id number]  Account:  [de-identified]  YOB: 1950  PCP:    Samm Resendez MD  Room:   2052/2052-01  Code Status:    Full Code    Chief Complaint:     Chief Complaint   Patient presents with    Leg Swelling    Fatigue    Shortness of Breath       History Obtained From:     patient    History of Present Illness: The patient is a 68 y.o. Non- / non  female who presents withLeg Swelling, Fatigue, and Shortness of Breath   and she is admitted to the hospital for the management of CHF exacerbation. Medical history includes heart failure with preserved ejection fraction, history of stroke with left-sided weakness, history of DVT on anticoagulation, hypertension, major depressive disorder, wheelchair-bound. Patient presents today sent in from Dr. Dominique Jalloh office due to week long worsening shortness of breath and nonproductive cough. She also has worsening leg edema bilaterally. She also reports having gained around 12 pounds over the previous month. She sleeps with 1-2 pillows to prop herself up due to worsening orthopnea. She denies chest pain, abdominal pain, nausea vomiting or diarrhea. She is afebrile. She also expresses concern that her  cannot take care of her at home due to his own health issues. She is requesting placement to long-term facility. In the ED, chest x-ray showed mild cardiomegaly without acute pulmonary process. BNP around baseline. CBC and CMP unremarkable. UA shows trace leukocyte esterase however microscopic urinalysis negative for bacteria or WBCs. Plan to diurese with IV Lasix. Monitor I's and O's and daily weights.   Echo to assess

## 2023-04-12 ENCOUNTER — APPOINTMENT (OUTPATIENT)
Dept: NON INVASIVE DIAGNOSTICS | Age: 73
DRG: 291 | End: 2023-04-12
Payer: MEDICARE

## 2023-04-12 LAB
ABSOLUTE EOS #: 0.4 K/UL (ref 0–0.4)
ABSOLUTE LYMPH #: 1.8 K/UL (ref 1–4.8)
ABSOLUTE MONO #: 0.5 K/UL (ref 0.1–1.3)
ANION GAP SERPL CALCULATED.3IONS-SCNC: 13 MMOL/L (ref 9–17)
BASOPHILS # BLD: 1 % (ref 0–2)
BASOPHILS ABSOLUTE: 0.1 K/UL (ref 0–0.2)
BUN SERPL-MCNC: 37 MG/DL (ref 8–23)
CALCIUM SERPL-MCNC: 9.1 MG/DL (ref 8.6–10.4)
CHLORIDE SERPL-SCNC: 100 MMOL/L (ref 98–107)
CO2 SERPL-SCNC: 28 MMOL/L (ref 20–31)
CREAT SERPL-MCNC: 1.12 MG/DL (ref 0.5–0.9)
EOSINOPHILS RELATIVE PERCENT: 7 % (ref 0–4)
GFR SERPL CREATININE-BSD FRML MDRD: 52 ML/MIN/1.73M2
GLUCOSE SERPL-MCNC: 135 MG/DL (ref 70–99)
HCT VFR BLD AUTO: 39.5 % (ref 36–46)
HGB BLD-MCNC: 13 G/DL (ref 12–16)
LV EF: 55 %
LVEF MODALITY: NORMAL
LYMPHOCYTES # BLD: 31 % (ref 24–44)
MCH RBC QN AUTO: 31 PG (ref 26–34)
MCHC RBC AUTO-ENTMCNC: 33 G/DL (ref 31–37)
MCV RBC AUTO: 94 FL (ref 80–100)
MONOCYTES # BLD: 9 % (ref 1–7)
PDW BLD-RTO: 13.5 % (ref 11.5–14.9)
PLATELET # BLD AUTO: 266 K/UL (ref 150–450)
PMV BLD AUTO: 7.1 FL (ref 6–12)
POTASSIUM SERPL-SCNC: 3.8 MMOL/L (ref 3.7–5.3)
RBC # BLD: 4.2 M/UL (ref 4–5.2)
SEG NEUTROPHILS: 52 % (ref 36–66)
SEGMENTED NEUTROPHILS ABSOLUTE COUNT: 3.2 K/UL (ref 1.3–9.1)
SODIUM SERPL-SCNC: 141 MMOL/L (ref 135–144)
WBC # BLD AUTO: 6 K/UL (ref 3.5–11)

## 2023-04-12 PROCEDURE — 85025 COMPLETE CBC W/AUTO DIFF WBC: CPT

## 2023-04-12 PROCEDURE — 97166 OT EVAL MOD COMPLEX 45 MIN: CPT

## 2023-04-12 PROCEDURE — 97162 PT EVAL MOD COMPLEX 30 MIN: CPT

## 2023-04-12 PROCEDURE — 2580000003 HC RX 258: Performed by: INTERNAL MEDICINE

## 2023-04-12 PROCEDURE — 6370000000 HC RX 637 (ALT 250 FOR IP): Performed by: NURSE PRACTITIONER

## 2023-04-12 PROCEDURE — 6360000002 HC RX W HCPCS

## 2023-04-12 PROCEDURE — 93306 TTE W/DOPPLER COMPLETE: CPT

## 2023-04-12 PROCEDURE — 80048 BASIC METABOLIC PNL TOTAL CA: CPT

## 2023-04-12 PROCEDURE — 36415 COLL VENOUS BLD VENIPUNCTURE: CPT

## 2023-04-12 PROCEDURE — 97116 GAIT TRAINING THERAPY: CPT

## 2023-04-12 PROCEDURE — 97535 SELF CARE MNGMENT TRAINING: CPT

## 2023-04-12 PROCEDURE — 6370000000 HC RX 637 (ALT 250 FOR IP)

## 2023-04-12 PROCEDURE — 6370000000 HC RX 637 (ALT 250 FOR IP): Performed by: INTERNAL MEDICINE

## 2023-04-12 PROCEDURE — 1200000000 HC SEMI PRIVATE

## 2023-04-12 RX ORDER — ACETAMINOPHEN 500 MG
1000 TABLET ORAL EVERY 8 HOURS PRN
Status: DISCONTINUED | OUTPATIENT
Start: 2023-04-12 | End: 2023-04-14 | Stop reason: HOSPADM

## 2023-04-12 RX ADMIN — SODIUM CHLORIDE, PRESERVATIVE FREE 10 ML: 5 INJECTION INTRAVENOUS at 07:30

## 2023-04-12 RX ADMIN — ACETAMINOPHEN 650 MG: 325 TABLET ORAL at 12:55

## 2023-04-12 RX ADMIN — PRAMIPEXOLE DIHYDROCHLORIDE 0.5 MG: 0.25 TABLET ORAL at 21:14

## 2023-04-12 RX ADMIN — APIXABAN 5 MG: 5 TABLET, FILM COATED ORAL at 21:14

## 2023-04-12 RX ADMIN — BUSPIRONE HYDROCHLORIDE 5 MG: 5 TABLET ORAL at 12:56

## 2023-04-12 RX ADMIN — FENOFIBRATE 160 MG: 160 TABLET ORAL at 07:29

## 2023-04-12 RX ADMIN — FERROUS SULFATE TAB 325 MG (65 MG ELEMENTAL FE) 325 MG: 325 (65 FE) TAB at 07:29

## 2023-04-12 RX ADMIN — CITALOPRAM HYDROBROMIDE 20 MG: 20 TABLET ORAL at 07:29

## 2023-04-12 RX ADMIN — Medication 6 MG: at 21:14

## 2023-04-12 RX ADMIN — PANTOPRAZOLE SODIUM 40 MG: 40 TABLET, DELAYED RELEASE ORAL at 05:43

## 2023-04-12 RX ADMIN — GABAPENTIN 100 MG: 100 CAPSULE ORAL at 21:14

## 2023-04-12 RX ADMIN — BUSPIRONE HYDROCHLORIDE 5 MG: 5 TABLET ORAL at 07:29

## 2023-04-12 RX ADMIN — CARVEDILOL 12.5 MG: 12.5 TABLET, FILM COATED ORAL at 07:29

## 2023-04-12 RX ADMIN — CARVEDILOL 12.5 MG: 12.5 TABLET, FILM COATED ORAL at 21:14

## 2023-04-12 RX ADMIN — FUROSEMIDE 40 MG: 10 INJECTION, SOLUTION INTRAMUSCULAR; INTRAVENOUS at 07:30

## 2023-04-12 RX ADMIN — ACETAMINOPHEN 650 MG: 325 TABLET ORAL at 05:00

## 2023-04-12 RX ADMIN — BUSPIRONE HYDROCHLORIDE 5 MG: 5 TABLET ORAL at 21:14

## 2023-04-12 RX ADMIN — ATORVASTATIN CALCIUM 20 MG: 20 TABLET, FILM COATED ORAL at 21:14

## 2023-04-12 RX ADMIN — ACETAMINOPHEN 1000 MG: 500 TABLET ORAL at 21:14

## 2023-04-12 RX ADMIN — SODIUM CHLORIDE, PRESERVATIVE FREE 10 ML: 5 INJECTION INTRAVENOUS at 21:14

## 2023-04-12 RX ADMIN — APIXABAN 5 MG: 5 TABLET, FILM COATED ORAL at 07:29

## 2023-04-12 RX ADMIN — GABAPENTIN 100 MG: 100 CAPSULE ORAL at 07:29

## 2023-04-12 ASSESSMENT — PAIN SCALES - GENERAL
PAINLEVEL_OUTOF10: 6
PAINLEVEL_OUTOF10: 6

## 2023-04-12 NOTE — ACP (ADVANCE CARE PLANNING)
Advance Care Planning     Advance Care Planning Activator (Inpatient)  Conversation Note      Date of ACP Conversation: 4/12/2023     Conversation Conducted with: Patient with Decision Making Capacity    ACP Activator: Reji Tong RN      Health Care Decision Maker:     Current Designated Health Care Decision Maker:     Primary Decision Maker: Mary Ann Engel - [phone]    Secondary Decision Maker: Claudette Perla - [phone]        Care Preferences    Ventilation: \"If you were in your present state of health and suddenly became very ill and were unable to breathe on your own, what would your preference be about the use of a ventilator (breathing machine) if it were available to you? \"      Would the patient desire the use of ventilator (breathing machine)?: yes    \"If your health worsens and it becomes clear that your chance of recovery is unlikely, what would your preference be about the use of a ventilator (breathing machine) if it were available to you? \"     Would the patient desire the use of ventilator (breathing machine)?: No      Resuscitation  \"CPR works best to restart the heart when there is a sudden event, like a heart attack, in someone who is otherwise healthy. Unfortunately, CPR does not typically restart the heart for people who have serious health conditions or who are very sick. \"    \"In the event your heart stopped as a result of an underlying serious health condition, would you want attempts to be made to restart your heart (answer \"yes\" for attempt to resuscitate) or would you prefer a natural death (answer \"no\" for do not attempt to resuscitate)? \" yes       [] Yes   [] No   Educated Patient / Cole Coe regarding differences between Advance Directives and portable DNR orders.     Length of ACP Conversation in minutes:      Conversation Outcomes:  ACP discussion completed    Follow-up plan:    [] Schedule follow-up conversation to continue planning  [] Referred individual

## 2023-04-12 NOTE — ED PROVIDER NOTES
HTN (hypertension)     Hx of blood clots     right leg    Hyperlipidemia     Intestinal or peritoneal adhesions with obstruction (postoperative) (postinfection) ([address]     Kidney infection     renal failure/sepsis/spider bite    Lateral epicondylitis  of elbow     MDRO (multiple drug resistant organisms) resistance     c diff    Muscle strain     right posterior shoulder    Other abnormal glucose     PONV (postoperative nausea and vomiting)     dry heaves    Pre-diabetes     Restless legs syndrome (RLS)     Snores     no cpap    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)     TIA (transient ischemic attack) 2014    Uses walker     Vitamin D deficiency     Wears glasses     Wellness examination     last seen 2 weeks ago     SURGICAL HISTORY       Past Surgical History:   Procedure Laterality Date    ABDOMEN SURGERY  1976    benign tumor removed near remaining ovary, 1.5 pounds    APPENDECTOMY  1968    appendix ruptured, developed peritonitis    BACK SURGERY      BUNIONECTOMY Left     along with calcium deposits removed    CARDIAC CATHETERIZATION      CARDIAC CATHETERIZATION  2005    negative    CERVICAL FUSION  05/21/2021    POSTERIOR C3-6 LAMINECTOMY, PARTIAL C7 LAMINECTOMY, FUSION C3-C6, SILVERCORD    CERVICAL FUSION N/A 05/21/2021    POSTERIOR C3-6 LAMINECTOMY, PARTIAL C7 LAMINECTOMY, FUSION C3-C6, SILVERCORD performed by Bin Lerner DO at [address] [place] [address]    12 INCHES REMOVED D/T OBSTRUCTION    COLONOSCOPY      CYST REMOVAL Right     right facial    FINGER CLOSED REDUCTION Left 08/24/2022    LEFT LITTLE FINGER CLOSED REDUCTION PINNING (Left: Little Finger)    FINGER CLOSED REDUCTION Left 8/24/2022    CLOSED REDUCTION PINNING LEFT LITTLE FINGER performed by Deborah Haro MD at [address] ([facility])  1973    taken as a result of recurring cysts    LUMBAR FUSION N/A 02/10/2020    LUMBAR L4-5 POSTERIOR  DECOMPRESSION INSTRUMENTATION FUSION WCEMENT

## 2023-04-13 LAB
ABSOLUTE EOS #: 0.3 K/UL (ref 0–0.4)
ABSOLUTE LYMPH #: 1.8 K/UL (ref 1–4.8)
ABSOLUTE MONO #: 0.4 K/UL (ref 0.1–1.3)
ANION GAP SERPL CALCULATED.3IONS-SCNC: 10 MMOL/L (ref 9–17)
BASOPHILS # BLD: 1 % (ref 0–2)
BASOPHILS ABSOLUTE: 0 K/UL (ref 0–0.2)
BUN SERPL-MCNC: 36 MG/DL (ref 8–23)
CALCIUM SERPL-MCNC: 9.1 MG/DL (ref 8.6–10.4)
CHLORIDE SERPL-SCNC: 100 MMOL/L (ref 98–107)
CO2 SERPL-SCNC: 27 MMOL/L (ref 20–31)
CREAT SERPL-MCNC: 0.97 MG/DL (ref 0.5–0.9)
EOSINOPHILS RELATIVE PERCENT: 7 % (ref 0–4)
GFR SERPL CREATININE-BSD FRML MDRD: >60 ML/MIN/1.73M2
GLUCOSE SERPL-MCNC: 123 MG/DL (ref 70–99)
HCT VFR BLD AUTO: 36.9 % (ref 36–46)
HGB BLD-MCNC: 12.4 G/DL (ref 12–16)
LYMPHOCYTES # BLD: 36 % (ref 24–44)
MCH RBC QN AUTO: 31.1 PG (ref 26–34)
MCHC RBC AUTO-ENTMCNC: 33.5 G/DL (ref 31–37)
MCV RBC AUTO: 93 FL (ref 80–100)
MONOCYTES # BLD: 9 % (ref 1–7)
PDW BLD-RTO: 13.5 % (ref 11.5–14.9)
PLATELET # BLD AUTO: 273 K/UL (ref 150–450)
PMV BLD AUTO: 7.2 FL (ref 6–12)
POTASSIUM SERPL-SCNC: 4.1 MMOL/L (ref 3.7–5.3)
RBC # BLD: 3.97 M/UL (ref 4–5.2)
SEG NEUTROPHILS: 47 % (ref 36–66)
SEGMENTED NEUTROPHILS ABSOLUTE COUNT: 2.4 K/UL (ref 1.3–9.1)
SODIUM SERPL-SCNC: 137 MMOL/L (ref 135–144)
WBC # BLD AUTO: 5.1 K/UL (ref 3.5–11)

## 2023-04-13 PROCEDURE — 85025 COMPLETE CBC W/AUTO DIFF WBC: CPT

## 2023-04-13 PROCEDURE — 2580000003 HC RX 258: Performed by: INTERNAL MEDICINE

## 2023-04-13 PROCEDURE — 1200000000 HC SEMI PRIVATE

## 2023-04-13 PROCEDURE — 99233 SBSQ HOSP IP/OBS HIGH 50: CPT | Performed by: INTERNAL MEDICINE

## 2023-04-13 PROCEDURE — 6370000000 HC RX 637 (ALT 250 FOR IP)

## 2023-04-13 PROCEDURE — 97530 THERAPEUTIC ACTIVITIES: CPT

## 2023-04-13 PROCEDURE — 80048 BASIC METABOLIC PNL TOTAL CA: CPT

## 2023-04-13 PROCEDURE — 36415 COLL VENOUS BLD VENIPUNCTURE: CPT

## 2023-04-13 RX ADMIN — FERROUS SULFATE TAB 325 MG (65 MG ELEMENTAL FE) 325 MG: 325 (65 FE) TAB at 08:17

## 2023-04-13 RX ADMIN — CITALOPRAM HYDROBROMIDE 20 MG: 20 TABLET ORAL at 08:18

## 2023-04-13 RX ADMIN — FENOFIBRATE 160 MG: 160 TABLET ORAL at 08:17

## 2023-04-13 RX ADMIN — BUSPIRONE HYDROCHLORIDE 5 MG: 5 TABLET ORAL at 08:18

## 2023-04-13 RX ADMIN — ACETAMINOPHEN 1000 MG: 500 TABLET ORAL at 21:00

## 2023-04-13 RX ADMIN — PANTOPRAZOLE SODIUM 40 MG: 40 TABLET, DELAYED RELEASE ORAL at 05:54

## 2023-04-13 RX ADMIN — BUSPIRONE HYDROCHLORIDE 5 MG: 5 TABLET ORAL at 13:50

## 2023-04-13 RX ADMIN — PRAMIPEXOLE DIHYDROCHLORIDE 0.5 MG: 0.25 TABLET ORAL at 20:59

## 2023-04-13 RX ADMIN — CARVEDILOL 12.5 MG: 12.5 TABLET, FILM COATED ORAL at 08:18

## 2023-04-13 RX ADMIN — GABAPENTIN 100 MG: 100 CAPSULE ORAL at 20:58

## 2023-04-13 RX ADMIN — SODIUM CHLORIDE, PRESERVATIVE FREE 10 ML: 5 INJECTION INTRAVENOUS at 21:00

## 2023-04-13 RX ADMIN — APIXABAN 5 MG: 5 TABLET, FILM COATED ORAL at 08:18

## 2023-04-13 RX ADMIN — CARVEDILOL 12.5 MG: 12.5 TABLET, FILM COATED ORAL at 21:00

## 2023-04-13 RX ADMIN — ATORVASTATIN CALCIUM 20 MG: 20 TABLET, FILM COATED ORAL at 20:58

## 2023-04-13 RX ADMIN — BUSPIRONE HYDROCHLORIDE 5 MG: 5 TABLET ORAL at 20:58

## 2023-04-13 RX ADMIN — SODIUM CHLORIDE, PRESERVATIVE FREE 10 ML: 5 INJECTION INTRAVENOUS at 08:19

## 2023-04-13 RX ADMIN — ACETAMINOPHEN 1000 MG: 500 TABLET ORAL at 06:01

## 2023-04-13 RX ADMIN — GABAPENTIN 100 MG: 100 CAPSULE ORAL at 08:17

## 2023-04-13 RX ADMIN — APIXABAN 5 MG: 5 TABLET, FILM COATED ORAL at 20:58

## 2023-04-13 ASSESSMENT — PAIN SCALES - GENERAL
PAINLEVEL_OUTOF10: 0
PAINLEVEL_OUTOF10: 5
PAINLEVEL_OUTOF10: 4

## 2023-04-13 ASSESSMENT — PAIN DESCRIPTION - LOCATION: LOCATION: HEAD

## 2023-04-13 ASSESSMENT — PAIN DESCRIPTION - DESCRIPTORS: DESCRIPTORS: ACHING

## 2023-04-14 VITALS
TEMPERATURE: 97.9 F | WEIGHT: 198 LBS | DIASTOLIC BLOOD PRESSURE: 65 MMHG | RESPIRATION RATE: 16 BRPM | HEIGHT: 63 IN | OXYGEN SATURATION: 94 % | SYSTOLIC BLOOD PRESSURE: 142 MMHG | BODY MASS INDEX: 35.08 KG/M2 | HEART RATE: 64 BPM

## 2023-04-14 PROBLEM — E87.70 HYPERVOLEMIA: Status: ACTIVE | Noted: 2023-04-14

## 2023-04-14 LAB
ABSOLUTE EOS #: 0.4 K/UL (ref 0–0.4)
ABSOLUTE LYMPH #: 1.9 K/UL (ref 1–4.8)
ABSOLUTE MONO #: 0.5 K/UL (ref 0.1–1.3)
ANION GAP SERPL CALCULATED.3IONS-SCNC: 11 MMOL/L (ref 9–17)
BASOPHILS # BLD: 1 % (ref 0–2)
BASOPHILS ABSOLUTE: 0 K/UL (ref 0–0.2)
BUN SERPL-MCNC: 28 MG/DL (ref 8–23)
CALCIUM SERPL-MCNC: 8.8 MG/DL (ref 8.6–10.4)
CHLORIDE SERPL-SCNC: 107 MMOL/L (ref 98–107)
CO2 SERPL-SCNC: 25 MMOL/L (ref 20–31)
CREAT SERPL-MCNC: 0.86 MG/DL (ref 0.5–0.9)
EOSINOPHILS RELATIVE PERCENT: 9 % (ref 0–4)
GFR SERPL CREATININE-BSD FRML MDRD: >60 ML/MIN/1.73M2
GLUCOSE SERPL-MCNC: 106 MG/DL (ref 70–99)
HCT VFR BLD AUTO: 34.5 % (ref 36–46)
HGB BLD-MCNC: 11.7 G/DL (ref 12–16)
LYMPHOCYTES # BLD: 40 % (ref 24–44)
MCH RBC QN AUTO: 31.7 PG (ref 26–34)
MCHC RBC AUTO-ENTMCNC: 34 G/DL (ref 31–37)
MCV RBC AUTO: 93 FL (ref 80–100)
MONOCYTES # BLD: 10 % (ref 1–7)
PDW BLD-RTO: 13.4 % (ref 11.5–14.9)
PLATELET # BLD AUTO: 239 K/UL (ref 150–450)
PMV BLD AUTO: 7.1 FL (ref 6–12)
POTASSIUM SERPL-SCNC: 4 MMOL/L (ref 3.7–5.3)
RBC # BLD: 3.7 M/UL (ref 4–5.2)
SEG NEUTROPHILS: 40 % (ref 36–66)
SEGMENTED NEUTROPHILS ABSOLUTE COUNT: 2 K/UL (ref 1.3–9.1)
SODIUM SERPL-SCNC: 143 MMOL/L (ref 135–144)
WBC # BLD AUTO: 4.9 K/UL (ref 3.5–11)

## 2023-04-14 PROCEDURE — 6370000000 HC RX 637 (ALT 250 FOR IP)

## 2023-04-14 PROCEDURE — 97116 GAIT TRAINING THERAPY: CPT

## 2023-04-14 PROCEDURE — 2580000003 HC RX 258: Performed by: INTERNAL MEDICINE

## 2023-04-14 PROCEDURE — 85025 COMPLETE CBC W/AUTO DIFF WBC: CPT

## 2023-04-14 PROCEDURE — 97535 SELF CARE MNGMENT TRAINING: CPT

## 2023-04-14 PROCEDURE — 97530 THERAPEUTIC ACTIVITIES: CPT

## 2023-04-14 PROCEDURE — 97110 THERAPEUTIC EXERCISES: CPT

## 2023-04-14 PROCEDURE — 80048 BASIC METABOLIC PNL TOTAL CA: CPT

## 2023-04-14 PROCEDURE — 6360000002 HC RX W HCPCS

## 2023-04-14 PROCEDURE — 99239 HOSP IP/OBS DSCHRG MGMT >30: CPT | Performed by: INTERNAL MEDICINE

## 2023-04-14 PROCEDURE — 36415 COLL VENOUS BLD VENIPUNCTURE: CPT

## 2023-04-14 RX ORDER — FUROSEMIDE 40 MG/1
40 TABLET ORAL 2 TIMES DAILY
Qty: 90 TABLET | Refills: 3 | Status: SHIPPED | OUTPATIENT
Start: 2023-04-14

## 2023-04-14 RX ORDER — FUROSEMIDE 10 MG/ML
40 INJECTION INTRAMUSCULAR; INTRAVENOUS DAILY
Status: DISCONTINUED | OUTPATIENT
Start: 2023-04-14 | End: 2023-04-14 | Stop reason: HOSPADM

## 2023-04-14 RX ADMIN — CARVEDILOL 12.5 MG: 12.5 TABLET, FILM COATED ORAL at 16:47

## 2023-04-14 RX ADMIN — CITALOPRAM HYDROBROMIDE 20 MG: 20 TABLET ORAL at 08:46

## 2023-04-14 RX ADMIN — ACETAMINOPHEN 1000 MG: 500 TABLET ORAL at 05:16

## 2023-04-14 RX ADMIN — BUSPIRONE HYDROCHLORIDE 5 MG: 5 TABLET ORAL at 14:11

## 2023-04-14 RX ADMIN — FENOFIBRATE 160 MG: 160 TABLET ORAL at 08:46

## 2023-04-14 RX ADMIN — GABAPENTIN 100 MG: 100 CAPSULE ORAL at 08:46

## 2023-04-14 RX ADMIN — SODIUM CHLORIDE, PRESERVATIVE FREE 10 ML: 5 INJECTION INTRAVENOUS at 08:57

## 2023-04-14 RX ADMIN — ACETAMINOPHEN 1000 MG: 500 TABLET ORAL at 14:40

## 2023-04-14 RX ADMIN — APIXABAN 5 MG: 5 TABLET, FILM COATED ORAL at 08:46

## 2023-04-14 RX ADMIN — PANTOPRAZOLE SODIUM 40 MG: 40 TABLET, DELAYED RELEASE ORAL at 05:17

## 2023-04-14 RX ADMIN — FERROUS SULFATE TAB 325 MG (65 MG ELEMENTAL FE) 325 MG: 325 (65 FE) TAB at 08:46

## 2023-04-14 RX ADMIN — BUSPIRONE HYDROCHLORIDE 5 MG: 5 TABLET ORAL at 08:46

## 2023-04-14 RX ADMIN — CARVEDILOL 12.5 MG: 12.5 TABLET, FILM COATED ORAL at 08:46

## 2023-04-14 RX ADMIN — FUROSEMIDE 40 MG: 10 INJECTION, SOLUTION INTRAMUSCULAR; INTRAVENOUS at 08:46

## 2023-04-14 ASSESSMENT — PAIN DESCRIPTION - ORIENTATION: ORIENTATION: RIGHT

## 2023-04-14 ASSESSMENT — PAIN SCALES - GENERAL
PAINLEVEL_OUTOF10: 5
PAINLEVEL_OUTOF10: 3
PAINLEVEL_OUTOF10: 5

## 2023-04-14 ASSESSMENT — PAIN DESCRIPTION - DESCRIPTORS: DESCRIPTORS: ACHING

## 2023-04-14 ASSESSMENT — PAIN DESCRIPTION - LOCATION
LOCATION: HEAD
LOCATION: ARM;SHOULDER

## 2023-04-14 NOTE — CARE COORDINATION
Case Management Assessment  Initial Evaluation    Date/Time of Evaluation: 4/12/2023 12:39 PM  Assessment Completed by: Dyllan Higginbotham RN    If patient is discharged prior to next notation, then this note serves as note for discharge by case management. Patient Name: Matthias Blakely                   YOB: 1950  Diagnosis: Hypervolemia, unspecified hypervolemia type [E87.70]  Acute on chronic diastolic (congestive) heart failure ([facility] [I50.33]                   Date / Time: 4/11/2023 11:42 AM    Patient Admission Status: Inpatient   Readmission Risk (Low < 19, Mod (19-27), High > 27): Readmission Risk Score: 21.4    Current PCP: Marisol Agosto MD  PCP verified by CM? Yes    Chart Reviewed: Yes      History Provided by: Patient  Patient Orientation: Alert and Oriented    Patient Cognition: Alert    Hospitalization in the last 30 days (Readmission):  No    If yes, Readmission Assessment in CM Navigator will be completed. Advance Directives:      Code Status: Full Code   Patient's Primary Decision Maker is:      Primary Decision Maker: True Tinoco - Spouse - [phone]    Secondary Decision Maker: Blanche Benito - Child - [phone]    Discharge Planning:    Patient lives with: Spouse/Significant Other Type of Home: Apartment  Primary Care Giver:    Patient Support Systems include: Spouse/Significant Other, Children   Current Financial resources: Medicare  Current community resources: None  Current services prior to admission: Home Care, Durable Medical Equipment ([place])            Current DME: [facility], Hospital Bed, Wheelchair (Raised Toilet seat/Handles, Shower Wand)            Type of Home Care services:  PT, OT    ADLS  Prior functional level: Assistance with the following:, Cooking, Housework, Shopping, Bathing  Current functional level: Assistance with the following:, Bathing, Cooking, Housework, Shopping (Pt. states Xiomy Pauline has help from Girl that lives next door\". )    PT AM-PAC:
DISCHARGE PLANNING NOTE:    Authorization approved for [facility]. Auth ID: [id number]. Faxed approval to [facility]. Notified Ginny from [facility] of this. Left perfect serve message for primary care RN and clin lead to notify of this as well and to find out how pt transports. Electronically signed by Army Epifanio RN on 4/14/2023 at 10:14 AM    uLda Comes from [facility] states they have bed available for patient today. Anticipate d/c later today per residents. Transportation arranged with [address] [place] [address] w/c for 5pm.    Need D/C order and ANNALEE completed. Electronically signed by Army Epifanio RN on 4/14/2023 at 11:15 AM    Notified primary care RNKerry, and clin lead, Ally Munson that pt will be discharged at [address] [place] [address],[address] as well as Luda Comes from [facility]. Writer also notified patient who called to inform her spouse while writer was in the room. Electronically signed by Army Epifanio RN on 4/14/2023 at 12:50 PM    Phone number for report: [phone]    Electronically signed by Army Epifanio RN on 4/14/2023 at 2:24 PM    Faxed ANNALEE and d/c med list to [facility]. HENS completed.      Electronically signed by Army Epifanio RN on 4/14/2023 at 3:33 PM
DISCHARGE PLANNING NOTE:    Authorization for [facility] submitted through [employer]. Pending with Auth ID: [id number].     Electronically signed by Sameer Vila RN on 4/13/2023 at 1:30 PM
DISCHARGE PLANNING NOTE:    Received message from Graham Arevalo at [facility] stating pt is current with their services.     Electronically signed by Wayne Catalan RN on 4/11/2023 at 4:44 PM
ONGOING DISCHARGE PLAN:    Patient is alert and oriented x4. Spoke with patient regarding discharge plan and patient confirms that plan is still [facility]. Pre-cert will be started today. Will continue to follow for additional discharge needs. If patient is discharged prior to next notation, then this note serves as note for discharge by case management.     Electronically signed by Yris Dale RN on 4/13/2023 at 11:58 AM
of recurring cysts    LUMBAR FUSION N/A 02/10/2020    LUMBAR L4-5 POSTERIOR  DECOMPRESSION INSTRUMENTATION FUSION WCEMENT AUGMENTATION/ performed by Tana Zelaya MD at [facility] N/A 06/17/2020    L5-S1 PLIF L4-L5 REVISION performed by Tana Zelaya MD at [address] [place] [address]  08/14/2014    FESS    OVARY REMOVAL  1970    UNILATERAL due to cyst    OVARY REMOVAL  1971    partial, due to cyst    SINUS SURGERY  2004    UPPER GASTROINTESTINAL ENDOSCOPY N/A 05/31/2019    EGD ESOPHAGOGASTRODUODENOSCOPY performed by Sondra Cushing, MD at [address] [place] [address] N/A 08/05/2019    EGD BIOPSY performed by Ladonna Espino MD at [address] [place] [address] N/A 08/23/2019    EGD BIOPSY performed by Sondra Cushing, MD at [address] Left 03/05/2019    WRIST OPEN REDUCTION INTERNAL FIXATION performed by Ryan Shi MD at [facility] OR       Immunization History:   Immunization History   Administered Date(s) Administered    COVID-19, PFIZER PURPLE top, DILUTE for use, (age 15 y+), 30mcg/0.3mL 03/18/2021, 04/08/2021    Influenza, High Dose (Fluzone 65 yrs and older) 11/17/2017    PPD Test 03/25/2020, 04/04/2020    Pneumococcal, PCV-13, PREVNAR 15, (age 6w+), IM, 0.5mL 05/23/2017    Pneumococcal, PPSV23, PNEUMOVAX 21, (age 2y+), SC/IM, 0.5mL 05/10/2016    TDaP, ADACEL (age 10y-63y), BOOSTRIX (age 10y+), IM, 0.5mL 02/13/2023       Active Problems:  Patient Active Problem List   Diagnosis Code    Other specified disorders of rotator cuff syndrome of shoulder and allied disorders [id number]    Diverticulosis of large intestine K57.30    Intestinal or peritoneal adhesions with obstruction (postoperative) (postinfection) (HCC) K56.50    Restless legs syndrome (RLS) G25.81    GERD (gastroesophageal reflux disease) K21.9    Hypertension I10    Hyperlipidemia E78.5    Other abnormal glucose R73.09    Atherosclerosis I70.90

## 2023-04-14 NOTE — PROGRESS NOTES
04/12/23 1352   Encounter Summary   Encounter Overview/Reason  Initial Encounter   Service Provided For: Patient   Referral/Consult From: Rounding   Complexity of Encounter Low   Spiritual/Emotional needs   Type Spiritual Support   Assessment/Intervention/Outcome   Assessment Unable to assess  (sleeping)   Intervention Prayer (assurance of)/[place]
04/14/23 1432   Encounter Summary   Encounter Overview/Reason  Spiritual/Emotional Needs   Service Provided For: Patient   Referral/Consult From: Rounding   Complexity of Encounter Low   Spiritual/Emotional needs   Type Spiritual Support   Assessment/Intervention/Outcome   Assessment Unable to assess  (patient sleeping)   Intervention Prayer (assurance of)/[place]
[id number] Roger Lind which is patient';s daughter. Writer to contact her for medications per patient.
[address]   INPATIENT OCCUPATIONAL THERAPY  PROGRESS NOTE  Date: 2023  Patient Name: Pratima Charlton       Room: 8787/5933-84  MRN: [id number]    : 1950  (68 y.o.)  Gender: female   Referring Practitioner: MD Jenni  Diagnosis: Acute on chronic CHF    Discharge Recommendations:  Further Occupational Therapy is recommended upon facility discharge. OT Equipment Recommendations  Other: TBD      Restrictions/Precautions  Restrictions/Precautions  Restrictions/Precautions: Fall Risk;General Precautions  Required Braces or Orthoses?: No  Implants present? : Metal implants (screws in neck and back, hardware in L wrist)    Vitals  O2 Device: None (Room air)    Subjective  Subjective  Subjective: pt states that she fell 2 times at home and hit her head. Pt is pleasant and agreeable for therapy  Subjective  Pain: Pt reported 5/10 chronic neck pain       Objective  Orientation  Overall Orientation Status: Within Functional Limits  Orientation Level: Oriented X4  Cognition  Overall Cognitive Status: WFL    Activities of Daily Living  ADL  Feeding: Setup  Grooming: Setup  UE Bathing: Stand by assistance  LE Bathing: Moderate assistance  UE Dressing: Stand by assistance  LE Dressing: Moderate assistance  Toileting: Moderate assistance  Additional Comments: ADL scores based on skilled observation and clinical reasoning, unless otherwise noted. Pt is limited due to generalized weakness, impaired balance, and low endurance, impacting safety and independence with self care tasks    Balance  Balance  Sitting Balance: Stand by assistance    Transfers/Mobility  Bed mobility  Bed Mobility Comments: pt sitting up in chair as writer enters/exits room           OT Exercises  Exercise Treatment: BUE AROM x20 reps, to increase strength/endurance for improved functional use. Good tolerance overall. Min cues for pace with good carry over.       Patient Education  Patient
[email or web address] [email or web address]    PROGRESS NOTE             4/13/2023    8:09 AM    Name:   Mitzy Day  MRN:     [id number]     Acct:      [de-identified]   Room:   2052/2052-01  IP Day:  2  Admit Date:  4/11/2023 11:42 AM    PCP:  Francesco Richmond MD  Code Status:  Full Code    Subjective:     C/C:   Chief Complaint   Patient presents with    Leg Swelling    Fatigue    Shortness of Breath     Interval History Status: improved. Patient seen and examined at bedside. No acute events overnight. Vitals have remained stable. She continues to deny shortness of breath, chest pain, abdominal pain, nausea vomiting diarrhea. Leg edema has decreased. Brief History:     The patient is a 68 y.o. Non- / non  female who presents withLeg Swelling, Fatigue, and Shortness of Breath   and she is admitted to the hospital for the management of CHF exacerbation. Medical history includes heart failure with preserved ejection fraction, history of stroke with left-sided weakness, history of DVT on anticoagulation, hypertension, major depressive disorder, wheelchair-bound. Patient presents today sent in from Dr. Michelle Garcias office due to week long worsening shortness of breath and nonproductive cough. She also has worsening leg edema bilaterally. She also reports having gained around 12 pounds over the previous month. She sleeps with 1-2 pillows to prop herself up due to worsening orthopnea. She denies chest pain, abdominal pain, nausea vomiting or diarrhea. She is afebrile. She also expresses concern that her  cannot take care of her at home due to his own health issues. She is requesting placement to long-term facility. In the ED, chest x-ray showed mild cardiomegaly without acute pulmonary process. BNP around baseline. CBC and CMP unremarkable. UA shows trace leukocyte esterase however microscopic urinalysis negative for bacteria or WBCs.   Plan to
[address]   INPATIENT OCCUPATIONAL THERAPY  PROGRESS NOTE  Date: 2023  Patient Name: Sunny Jarrett       Room: 1330/9164-34  MRN: [id number]    : 1950  (68 y.o.)  Gender: female   Referring Practitioner: John Sow MD  Diagnosis: Acute on chronic CHF    Discharge Recommendations:    OT Equipment Recommendations  Other: TBD      Restrictions/Precautions  Restrictions/Precautions  Restrictions/Precautions: Fall Risk;General Precautions  Required Braces or Orthoses?: No  Implants present? : Metal implants (screws in neck and back, hardware in L wrist)  Position Activity Restriction  Other position/activity restrictions: telemetry, HIGH fall risk d/t multiple falls at home    Vitals  BP Location: Right upper arm  O2 Device: None (Room air)    Subjective  Subjective  Subjective: \"I need to go to the bathroom! \"  Comments: Okay for Co-tx per MEENU Alvarado    Objective  Orientation  Overall Orientation Status: Within Functional Limits  Orientation Level: Oriented X4  Cognition  Overall Cognitive Status: WFL    Activities of Daily Living  ADL  Feeding: Setup  Feeding Skilled Clinical Factors: Pt is able to open some containers, req A for opening soda can. \"I have a device at home to open my pop. \"  Grooming: Setup  UE Bathing: Stand by assistance  UE Bathing Skilled Clinical Factors: Pt demo reaching to top of head and reaching to sides of trunk while seated EOB unsupported. LE Bathing: Moderate assistance  UE Dressing: Stand by assistance  LE Dressing: Stand by assistance  LE Dressing Skilled Clinical Factors: able to doff/verena brief with increased time. Toileting: Stand by assistance  Toileting Skilled Clinical Factors: Pt requested to go to the bathroom, voided seated on toilet. able to doff/verena brief and complete hygiene with increased time. Additional Comments: ADL scores based on skilled observation and clinical reasoning, unless otherwise noted.  Pt is limited due to
Medication History completed:    New medications: none    Medications discontinued: none    Medications flagged for review:   Amlodipine   Fluticasone     Changes to dosing: none    Stated allergies: as reported    Other pertinent information: medications confirmed with [facility]. Patient reports no longer taking amlodipine and fluticasone. She reports to be getting prescriptions from mail order, unable to confirm medication list with mail order pharmacy. Confirmed with [facility] gabapentin last filled 12/05/2022 for 30 day supply. Stephon Cabello  PharmD Candidate 2023.
Occup[facility]   Occupational Therapy Evaluation  Date: 23  Patient Name: Concepcion Aguiar       Room: 0972/0289-49  MRN: [id number]  Account: [de-identified]   : 1950  (74 y.o.) Gender: female     Discharge Recommendations:  Further Occupational Therapy is recommended upon facility discharge. OT Equipment Recommendations  Other: TBD    Referring Practitioner: Crystal Majano MD  Diagnosis: Acute on chronic CHF Additional Pertinent Hx: Patient presents today sent in from Dr. Silverio Simmons office due to week long worsening shortness of breath and nonproductive cough. She also has worsening leg edema bilaterally. She also reports having gained around 12 pounds over the previous month. Treatment Diagnosis: Impaired self-care status    Past Medical History:  has a past medical history of Allergic rhinitis, cause unspecified, Back pain, Bowel obstruction (HCC), C. difficile diarrhea, CAD (coronary artery disease), Cardiac murmur, Cellulitis, Cellulitis, Cerebral artery occlusion with cerebral infarction ([address] COVID-19, Diverticulosis of colon (without mention of hemorrhage), GERD (gastroesophageal reflux disease), GERD (gastroesophageal reflux disease), History of blood transfusion, History of CHF (congestive heart failure), History of MI (myocardial infarction), History of ovarian cyst, History of peritonitis, HTN (hypertension), Hx of blood clots, Hyperlipidemia, Intestinal or peritoneal adhesions with obstruction (postoperative) (postinfection) ([address] Kidney infection, Lateral epicondylitis  of elbow, MDRO (multiple drug resistant organisms) resistance, Muscle strain, Other abnormal glucose, PONV (postoperative nausea and vomiting), Pre-diabetes, Restless legs syndrome (RLS), Snores, 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[address] TIA (transient ischemic attack), Uses walker, Vitamin D deficiency, Wears glasses, and Wellness examination.     Past Surgical History:
Patient agreeable to discharge to facility.  notified per patient. Pt denies any concerns for discharge at this time. Peripheral IV and telemetry removed per RN. Patient assisted with getting dressed. All belongings packed and at the bedside. Patient currently awaiting transport. Report called to the facility @ [phone], spoke with April.
Patient c/o neck pain that is chronic. Patient states at home she normally takes 3 or 4 tylenol for the pain. Patient told writer that tylenol we are giving her here does not help that much. Writer contacting Dr. Velasquez Rice regarding the above information.
Patient known from previous admissions; welcomed prayer     04/12/23 1800   Encounter Summary   Encounter Overview/Reason  Spiritual/Emotional Needs   Service Provided For: Patient   Referral/Consult From: Rounding   Last Encounter  04/12/23   Complexity of Encounter Moderate   Spiritual/Emotional needs   Type Spiritual Support   Assessment/Intervention/Outcome   Assessment Coping; Hopeful;Powerlessness   Intervention Explored/Affirmed feelings, thoughts, concerns;Prayer (assurance of)/[place];Sustaining Presence/Ministry of presence   Outcome Engaged in conversation;Expressed feelings, needs, and concerns;Expressed Gratitude;Receptive
Patient unsure if she takes hydralazine still or not. Writer still awaiting  to bring home medication list for writer to verify.
Per Dr. Solis Carter, resident, pt is ok to have ble ace wrapped for swelling purposes.
Physical Therapy  Facility/Department: [facility] MED SURG  Daily Treatment Note  NAME: Sharlene Rivera  : 1950  MRN: [id number]    Date of Service: 2023    Discharge Recommendations:  Patient would benefit from continued therapy after discharge   PT Equipment Recommendations  Equipment Needed: No    Patient Diagnosis(es): The encounter diagnosis was Hypervolemia, unspecified hypervolemia type. Assessment   Activity Tolerance: Other (comment) (limited due to lunch arriving)  Equipment Needed: No     Plan    Physcial Therapy Plan  General Plan: 5-7 times per week  Current Treatment Recommendations: Strengthening;Balance training;Functional mobility training;Transfer training;Gait training; Endurance training; Safety education & training;Patient/Caregiver education & training;Home exercise program;Positioning; Therapeutic activities     Restrictions  Restrictions/Precautions  Restrictions/Precautions: Fall Risk, General Precautions  Required Braces or Orthoses?: No  Implants present? : Metal implants (screws in neck and back, hardware in L wrist)  Position Activity Restriction  Other position/activity restrictions: telemetry, HIGH fall risk d/t multiple falls at home     Subjective    Subjective  Subjective: Pt's call light is lit up when entering pt's room. Pt reports receiving lasix and needs to hurry to the toilet. Pt is pleasant and agreeable to PT. Pain: Patient made no mention of pain throughout tx.   Orientation  Overall Orientation Status: Within Functional Limits  Orientation Level: Oriented X4  Cognition  Overall Cognitive Status: WFL     Objective   Vitals     Bed Mobility Training  Bed Mobility Training: Yes  Rolling: Stand-by assistance  Supine to Sit: Stand-by assistance  Sit to Supine:  (pt left in bedside chair to eat lunch.)  Scooting: Stand-by assistance  Balance  Sitting: Impaired  Sitting - Static: Fair (occasional) (EOB pt has heavy right posterior lean initially, assisted pt with moving hips,
Updated Dr. Mike Cárdenas, resident team of calf pain to LLE, worse than right. Pt states hx of blood clot to RLE. To review information and see pt.
Writer agrees with all Alexandra RN charting.
Writer contacted [place] NP as pt is struggling to sleeps and states she uses melatonin at home.  Order placed by NP.
Writer notified residents of admission.
Writer spoke with Marcia Stanford, she told writer that she will have patient's  bring in updated medication list.
Tolerance  Activity Tolerance: Patient limited by fatigue;Patient limited by endurance  Activity Tolerance Comments: HENDERSON noted with ambulation in room     Plan   Physcial Therapy Plan  General Plan: 5-7 times per week  Current Treatment Recommendations: Strengthening, Balance training, Functional mobility training, Transfer training, Gait training, Endurance training, Safety education & training, Patient/Caregiver education & training, Home exercise program, Positioning, Therapeutic activities  Safety Devices  Type of Devices: Gait belt, Patient at risk for falls, Call light within reach, Left in bed, Nurse notified     Restrictions  Restrictions/Precautions  Restrictions/Precautions: Fall Risk, General Precautions  Required Braces or Orthoses?: No  Implants present? : Metal implants (screws in neck and back, hardware in L wrist)  Position Activity Restriction  Other position/activity restrictions: telemetry, HIGH fall risk d/t multiple falls at home     Subjective   Pain: c/o chronic neck pain, rates at 6/10  General  Chart Reviewed: Yes  Patient assessed for rehabilitation services?: Yes  Response To Previous Treatment: Not applicable  Family / Caregiver Present: No  Diagnosis: hypovolemia  Follows Commands: Within Functional Limits  General Comment  Comments: Pt is sitting in chair when PT/OT entered. RN ok's session  Subjective  Subjective: Pt is finishing lunch, pleasant and agreeable to PT/OT. States she initially planned to d/c to SNF, but states he  is unable to take care of her at home and will most likely require long term ECF.          Social/Functional History  Social/Functional History  Lives With: Spouse  Type of Home: House  Home Layout: One level  Home Access: Stairs to enter without rails  Entrance Stairs - Number of Steps: 1  Bathroom Shower/Tub: Tub/Shower unit  Bathroom Toilet: Standard  Bathroom Equipment: Grab bars around toilet, Toilet raiser  Bathroom Accessibility: Wheelchair
2/21/2023     Major depressive disorder  - Celexa and BuSpar        DVT prophylaxis: On home Eliquis  GI prophylaxis: Protonix 40 mg daily  Diet: Regular adult diet, low-sodium  CODE STATUS: Full code  PT/OT/social work       Kaley Cantu MD  4/12/2023  7:39 AM     Attestation and add on       I have discussed the care of Endy Butt , including pertinent history and exam findings,      4/12/23    with the resident. I have seen and examined the patient and the key elements of all parts of the encounter have been performed by me . I agree with the assessment, plan and orders as documented by the resident. Patient admitted with CHF exacerbation  Improving  Echo pending     Sunny Larios MD      [address], [address] [place] [address].    Phone [phone]   Fax: [phone]  Answering Service: [phone]
muscular pain and tightness. Multiple falls at home. - Pramipexole 0.5 mg nightly  - Gabapentin 100 mg twice daily     Prediabetes  - Most recent hemoglobin A1c of 6.0 on 2/21/2023     Major depressive disorder  - Celexa and BuSpar        DVT prophylaxis: On home Eliquis  GI prophylaxis: Protonix 40 mg daily  Diet: Regular adult diet, low-sodium. 1500 mL fluid restriction  CODE STATUS: Full code  PT/OT/social work  Dispo: SNF pending       Kaley Cantu MD  4/14/2023  8:34 AM     Attending Physician Statement  I have discussed the care of Endy Butt and I have examined the patient myselft and taken ros and hpi , including pertinent history and exam findings,  with the resident. I have reviewed the key elements of all parts of the encounter with the resident. I agree with the assessment, plan and orders as documented by the resident.   Patient, clinically doing much better today  Negative balance of 1.5 L  Swelling in legs is much improved, patient both legs are wrapped  DC plan  We will discharge on 40 mg of Lasix twice a day PO     Electronically signed by Willian Knapp MD

## 2023-04-14 NOTE — PLAN OF CARE
Plan of care   There was some concern today due to medial left ankle pain with history of DVT. Patient was seen and examined. The area is not warm or erythematous. Is not increased in size compared to the other ankle. No need for DVT scan at this time. Patient continues on home dose eliquis 5 mg bid.  Okay to continue with PT/OT    Electronically signed by Abilio Hair MD on 4/13/2023 at 12:42 PM
Problem: Discharge Planning  Goal: Discharge to home or other facility with appropriate resources  Outcome: Progressing     Problem: Pain  Goal: Verbalizes/displays adequate comfort level or baseline comfort level  Outcome: Progressing     Problem: Skin/Tissue Integrity  Goal: Absence of new skin breakdown  Description: 1. Monitor for areas of redness and/or skin breakdown  2. Assess vascular access sites hourly  3. Every 4-6 hours minimum:  Change oxygen saturation probe site  4. Every 4-6 hours:  If on nasal continuous positive airway pressure, respiratory therapy assess nares and determine need for appliance change or resting period.   Outcome: Progressing     Problem: Safety - Adult  Goal: Free from fall injury  Outcome: Progressing     Problem: ABCDS Injury Assessment  Goal: Absence of physical injury  Outcome: Progressing
Problem: Discharge Planning  Goal: Discharge to home or other facility with appropriate resources  Outcome: Progressing  Flowsheets (Taken 4/13/2023 0750)  Discharge to home or other facility with appropriate resources: Identify barriers to discharge with patient and caregiver   Pt to discharge to facility once precert is approved. Problem: Pain  Goal: Verbalizes/displays adequate comfort level or baseline comfort level  Outcome: Progressing  Flowsheets (Taken 4/13/2023 0800)  Verbalizes/displays adequate comfort level or baseline comfort level: Encourage patient to monitor pain and request assistance   Pt currently denies pain this shift. Will continue to monitor. Problem: Skin/Tissue Integrity  Goal: Absence of new skin breakdown  Description: 1. Monitor for areas of redness and/or skin breakdown  2. Assess vascular access sites hourly  3. Every 4-6 hours minimum:  Change oxygen saturation probe site  4. Every 4-6 hours:  If on nasal continuous positive airway pressure, respiratory therapy assess nares and determine need for appliance change or resting period. Outcome: Progressing   No new altered skin noted this shift. Problem: Safety - Adult  Goal: Free from fall injury  Outcome: Progressing  Flowsheets  Taken 4/13/2023 0803 by Ed Olivier RN  Free From Fall Injury: Instruct family/caregiver on patient safety  Taken 4/12/2023 2334 by Markos Feng RN  Free From Fall Injury: Instruct family/caregiver on patient safety   No injury noted this shift. Problem: ABCDS Injury Assessment  Goal: Absence of physical injury  Outcome: Progressing  Flowsheets  Taken 4/13/2023 0803 by Ed Olivier RN  Absence of Physical Injury: Implement safety measures based on patient assessment  Taken 4/12/2023 2334 by Markos Feng RN  Absence of Physical Injury: Implement safety measures based on patient assessment   No injury noted this shift.    Problem: Cardiovascular - Adult  Goal: Maintains optimal cardiac
Problem: Discharge Planning  Goal: Discharge to home or other facility with appropriate resources  Outcome: Progressing  Flowsheets (Taken 4/14/2023 0323)  Discharge to home or other facility with appropriate resources:   Identify barriers to discharge with patient and caregiver   Arrange for needed discharge resources and transportation as appropriate   Identify discharge learning needs (meds, wound care, etc)   Refer to discharge planning if patient needs post-hospital services based on physician order or complex needs related to functional status, cognitive ability or social support system     Problem: Pain  Goal: Verbalizes/displays adequate comfort level or baseline comfort level  Outcome: Progressing  Flowsheets (Taken 4/14/2023 0323)  Verbalizes/displays adequate comfort level or baseline comfort level:   Encourage patient to monitor pain and request assistance   Assess pain using appropriate pain scale   Administer analgesics based on type and severity of pain and evaluate response   Implement non-pharmacological measures as appropriate and evaluate response   Consider cultural and social influences on pain and pain management   Notify Licensed Independent Practitioner if interventions unsuccessful or patient reports new pain     Problem: Skin/Tissue Integrity  Goal: Absence of new skin breakdown  Description: 1. Monitor for areas of redness and/or skin breakdown  2. Assess vascular access sites hourly  3. Every 4-6 hours minimum:  Change oxygen saturation probe site  4. Every 4-6 hours:  If on nasal continuous positive airway pressure, respiratory therapy assess nares and determine need for appliance change or resting period.   Outcome: Progressing  Note: Monitoring skin      Problem: Safety - Adult  Goal: Free from fall injury  Outcome: Progressing  Flowsheets (Taken 4/14/2023 0323)  Free From Fall Injury: Instruct family/caregiver on patient safety     Problem: ABCDS Injury Assessment  Goal: Absence
4/12/2023 0810)  Electrolytes maintained within normal limits: Monitor labs and assess patient for signs and symptoms of electrolyte imbalances  Goal: Hemodynamic stability and optimal renal function maintained  Outcome: Progressing  Flowsheets (Taken 4/12/2023 0810)  Hemodynamic stability and optimal renal function maintained: Monitor labs and assess for signs and symptoms of volume excess or deficit
Mel Schilling RN  Outcome: Adequate for Discharge  Flowsheets (Taken 4/14/2023 0845)  Hemodynamic stability and optimal renal function maintained:   Monitor labs and assess for signs and symptoms of volume excess or deficit   Monitor intake, output and patient weight   Monitor urine specific gravity, serum osmolarity and serum sodium as indicated or ordered   Monitor response to interventions for patient's volume status, including labs, urine output, blood pressure (other measures as available)   Encourage oral intake as appropriate   Instruct patient on fluid and nutrition restrictions as appropriate  4/14/2023 0323 by Karolyn Solis RN  Outcome: Progressing  Flowsheets (Taken 4/14/2023 0323)  Hemodynamic stability and optimal renal function maintained: Monitor labs and assess for signs and symptoms of volume excess or deficit

## 2023-04-19 ENCOUNTER — HOSPITAL ENCOUNTER (OUTPATIENT)
Age: 73
Setting detail: SPECIMEN
Discharge: HOME | End: 2023-04-19

## 2023-04-19 LAB
ANION GAP SERPL CALCULATED.3IONS-SCNC: 14 MMOL/L (ref 9–17)
BUN SERPL-MCNC: 35 MG/DL (ref 8–23)
CALCIUM SERPL-MCNC: 9 MG/DL (ref 8.6–10.4)
CHLORIDE SERPL-SCNC: 106 MMOL/L (ref 98–107)
CO2 SERPL-SCNC: 24 MMOL/L (ref 20–31)
CREAT SERPL-MCNC: 1.22 MG/DL (ref 0.5–0.9)
GFR SERPL CREATININE-BSD FRML MDRD: 47 ML/MIN/1.73M2
GLUCOSE SERPL-MCNC: 89 MG/DL (ref 70–99)
HCT VFR BLD AUTO: 39.1 % (ref 36.3–47.1)
HGB BLD-MCNC: 12.5 G/DL (ref 11.9–15.1)
MCH RBC QN AUTO: 30.9 PG (ref 25.2–33.5)
MCHC RBC AUTO-ENTMCNC: 32 G/DL (ref 28.4–34.8)
MCV RBC AUTO: 96.5 FL (ref 82.6–102.9)
NRBC AUTOMATED: 0 PER 100 WBC
PDW BLD-RTO: 13 % (ref 11.8–14.4)
PLATELET # BLD AUTO: 283 K/UL (ref 138–453)
PMV BLD AUTO: 9.6 FL (ref 8.1–13.5)
POTASSIUM SERPL-SCNC: 3.8 MMOL/L (ref 3.7–5.3)
RBC # BLD: 4.05 M/UL (ref 3.95–5.11)
SODIUM SERPL-SCNC: 144 MMOL/L (ref 135–144)
WBC # BLD AUTO: 5.5 K/UL (ref 3.5–11.3)

## 2023-04-19 PROCEDURE — 80048 BASIC METABOLIC PNL TOTAL CA: CPT

## 2023-04-19 PROCEDURE — 36415 COLL VENOUS BLD VENIPUNCTURE: CPT

## 2023-04-19 PROCEDURE — P9603 ONE-WAY ALLOW PRORATED MILES: HCPCS

## 2023-04-19 PROCEDURE — 85027 COMPLETE CBC AUTOMATED: CPT

## 2023-04-25 ENCOUNTER — HOSPITAL ENCOUNTER (OUTPATIENT)
Age: 73
Setting detail: SPECIMEN
Discharge: HOME | End: 2023-04-25

## 2023-04-25 LAB
ANION GAP SERPL CALCULATED.3IONS-SCNC: 15 MMOL/L (ref 9–17)
BUN SERPL-MCNC: 30 MG/DL (ref 8–23)
CALCIUM SERPL-MCNC: 9 MG/DL (ref 8.6–10.4)
CHLORIDE SERPL-SCNC: 106 MMOL/L (ref 98–107)
CO2 SERPL-SCNC: 25 MMOL/L (ref 20–31)
CREAT SERPL-MCNC: 0.87 MG/DL (ref 0.5–0.9)
GFR SERPL CREATININE-BSD FRML MDRD: >60 ML/MIN/1.73M2
GLUCOSE SERPL-MCNC: 111 MG/DL (ref 70–99)
HCT VFR BLD AUTO: 39.5 % (ref 36.3–47.1)
HGB BLD-MCNC: 12.7 G/DL (ref 11.9–15.1)
MCH RBC QN AUTO: 31.1 PG (ref 25.2–33.5)
MCHC RBC AUTO-ENTMCNC: 32.2 G/DL (ref 28.4–34.8)
MCV RBC AUTO: 96.8 FL (ref 82.6–102.9)
NRBC AUTOMATED: 0 PER 100 WBC
PDW BLD-RTO: 13.1 % (ref 11.8–14.4)
PLATELET # BLD AUTO: 301 K/UL (ref 138–453)
PMV BLD AUTO: 10.1 FL (ref 8.1–13.5)
POTASSIUM SERPL-SCNC: 3.6 MMOL/L (ref 3.7–5.3)
RBC # BLD: 4.08 M/UL (ref 3.95–5.11)
SODIUM SERPL-SCNC: 146 MMOL/L (ref 135–144)
WBC # BLD AUTO: 5.9 K/UL (ref 3.5–11.3)

## 2023-04-25 PROCEDURE — 85027 COMPLETE CBC AUTOMATED: CPT

## 2023-04-25 PROCEDURE — P9603 ONE-WAY ALLOW PRORATED MILES: HCPCS

## 2023-04-25 PROCEDURE — 80048 BASIC METABOLIC PNL TOTAL CA: CPT

## 2023-04-25 PROCEDURE — 36415 COLL VENOUS BLD VENIPUNCTURE: CPT

## 2023-04-28 ENCOUNTER — HOSPITAL ENCOUNTER (OUTPATIENT)
Age: 73
Setting detail: SPECIMEN
Discharge: HOME | End: 2023-04-28

## 2023-04-28 LAB
ANION GAP SERPL CALCULATED.3IONS-SCNC: 11 MMOL/L (ref 9–17)
BUN SERPL-MCNC: 29 MG/DL (ref 8–23)
CALCIUM SERPL-MCNC: 9.4 MG/DL (ref 8.6–10.4)
CHLORIDE SERPL-SCNC: 107 MMOL/L (ref 98–107)
CO2 SERPL-SCNC: 25 MMOL/L (ref 20–31)
CREAT SERPL-MCNC: 1.02 MG/DL (ref 0.5–0.9)
GFR SERPL CREATININE-BSD FRML MDRD: 58 ML/MIN/1.73M2
GLUCOSE SERPL-MCNC: 90 MG/DL (ref 70–99)
POTASSIUM SERPL-SCNC: 3.6 MMOL/L (ref 3.7–5.3)
SODIUM SERPL-SCNC: 143 MMOL/L (ref 135–144)

## 2023-04-28 PROCEDURE — 80048 BASIC METABOLIC PNL TOTAL CA: CPT

## 2023-04-28 PROCEDURE — P9603 ONE-WAY ALLOW PRORATED MILES: HCPCS

## 2023-04-28 PROCEDURE — 36415 COLL VENOUS BLD VENIPUNCTURE: CPT

## 2023-05-01 ENCOUNTER — HOSPITAL ENCOUNTER (OUTPATIENT)
Age: 73
Setting detail: SPECIMEN
Discharge: HOME | End: 2023-05-01
Payer: MEDICARE

## 2023-05-01 LAB
ANION GAP SERPL CALCULATED.3IONS-SCNC: 15 MMOL/L (ref 9–17)
BUN SERPL-MCNC: 29 MG/DL (ref 8–23)
CALCIUM SERPL-MCNC: 9.1 MG/DL (ref 8.6–10.4)
CHLORIDE SERPL-SCNC: 104 MMOL/L (ref 98–107)
CO2 SERPL-SCNC: 25 MMOL/L (ref 20–31)
CREAT SERPL-MCNC: 1.06 MG/DL (ref 0.5–0.9)
GFR SERPL CREATININE-BSD FRML MDRD: 56 ML/MIN/1.73M2
GLUCOSE SERPL-MCNC: 112 MG/DL (ref 70–99)
HCT VFR BLD AUTO: 38.8 % (ref 36.3–47.1)
HGB BLD-MCNC: 12.9 G/DL (ref 11.9–15.1)
MCH RBC QN AUTO: 31.5 PG (ref 25.2–33.5)
MCHC RBC AUTO-ENTMCNC: 33.2 G/DL (ref 28.4–34.8)
MCV RBC AUTO: 94.6 FL (ref 82.6–102.9)
NRBC AUTOMATED: 0 PER 100 WBC
PDW BLD-RTO: 13.2 % (ref 11.8–14.4)
PLATELET # BLD AUTO: 271 K/UL (ref 138–453)
PMV BLD AUTO: 9.9 FL (ref 8.1–13.5)
POTASSIUM SERPL-SCNC: 3.9 MMOL/L (ref 3.7–5.3)
RBC # BLD: 4.1 M/UL (ref 3.95–5.11)
SODIUM SERPL-SCNC: 144 MMOL/L (ref 135–144)
WBC # BLD AUTO: 6 K/UL (ref 3.5–11.3)

## 2023-05-01 PROCEDURE — 85027 COMPLETE CBC AUTOMATED: CPT

## 2023-05-01 PROCEDURE — 80048 BASIC METABOLIC PNL TOTAL CA: CPT

## 2023-05-01 PROCEDURE — P9603 ONE-WAY ALLOW PRORATED MILES: HCPCS

## 2023-05-01 PROCEDURE — 36415 COLL VENOUS BLD VENIPUNCTURE: CPT

## 2023-05-02 ENCOUNTER — OFFICE VISIT (OUTPATIENT)
Dept: NEUROSURGERY | Age: 73
End: 2023-05-02

## 2023-05-02 VITALS
HEIGHT: 63 IN | BODY MASS INDEX: 35.08 KG/M2 | DIASTOLIC BLOOD PRESSURE: 77 MMHG | HEART RATE: 74 BPM | WEIGHT: 198 LBS | OXYGEN SATURATION: 96 % | SYSTOLIC BLOOD PRESSURE: 130 MMHG

## 2023-05-02 DIAGNOSIS — Z98.1 S/P CERVICAL SPINAL FUSION: Primary | ICD-10-CM

## 2023-05-02 DIAGNOSIS — M48.02 CERVICAL SPINAL STENOSIS DUE TO ADJACENT SEGMENT DISEASE AFTER FUSION PROCEDURE: ICD-10-CM

## 2023-05-02 DIAGNOSIS — M50.30 CERVICAL SPINAL STENOSIS DUE TO ADJACENT SEGMENT DISEASE AFTER FUSION PROCEDURE: ICD-10-CM

## 2023-05-02 DIAGNOSIS — M40.12 OTHER SECONDARY KYPHOSIS, CERVICAL REGION: ICD-10-CM

## 2023-05-02 PROBLEM — Q76.412: Status: ACTIVE | Noted: 2023-05-02

## 2023-05-02 RX ORDER — METHOCARBAMOL 750 MG/1
750 TABLET, FILM COATED ORAL 3 TIMES DAILY PRN
Qty: 90 TABLET | Refills: 1 | Status: SHIPPED | OUTPATIENT
Start: 2023-05-02 | End: 2023-05-12

## 2023-05-02 NOTE — PROGRESS NOTES
Department of Neurosurgery                                                      Follow up visit      History Obtained From:  patient, spouse    CHIEF COMPLAINT:         Chief Complaint   Patient presents with    Neck Pain     Stenosis of cervical spine with myelopathy        HISTORY OF PRESENT ILLNESS:       The patient is a 68 y.o. female who presents for follow up for recent falls. She had a workup which reveals c23 dynamic instability and C67 kyphosis. She does report increased pain in last several month    PAST MEDICAL HISTORY :       Past Medical History:        Diagnosis Date    Allergic rhinitis, cause unspecified     Back pain     lumbar    Bowel obstruction (HCC)     history of due to scar tissue, resolved non-surgically    C. difficile diarrhea     CAD (coronary artery disease)     no stent needed per pt.  Dr. Arie Torrez did cath at  2005    Cardiac murmur     Cellulitis     left leg    Cellulitis 2017 August    leg left leg/bug bite    Cerebral artery occlusion with cerebral infarction [facility])     TIA 2014    COVID-19     ONE YR AGO IN 4/25/2020 fever and cough    Diverticulosis of colon (without mention of hemorrhage)     GERD (gastroesophageal reflux disease)     GERD (gastroesophageal reflux disease)     on rx    History of blood transfusion     approx 2020        History of CHF (congestive heart failure)     History of MI (myocardial infarction) 2005    thought due to a blood clot    History of ovarian cyst 1970    had oopherectomy holly    History of peritonitis 1968    due to ruptured appendix age 13    HTN (hypertension)     Hx of blood clots     right leg    Hyperlipidemia     Intestinal or peritoneal adhesions with obstruction (postoperative) (postinfection) ([address]     Kidney infection     renal failure/sepsis/spider bite    Lateral epicondylitis  of elbow     MDRO (multiple drug resistant organisms) resistance     c diff    Muscle strain     right posterior shoulder    Other abnormal glucose

## 2023-05-03 ENCOUNTER — CARE COORDINATION (OUTPATIENT)
Dept: CASE MANAGEMENT | Age: 73
End: 2023-05-03

## 2023-05-03 DIAGNOSIS — I50.32 CHRONIC DIASTOLIC HEART FAILURE (HCC): Primary | ICD-10-CM

## 2023-05-03 PROCEDURE — 1111F DSCHRG MED/CURRENT MED MERGE: CPT | Performed by: INTERNAL MEDICINE

## 2023-05-03 NOTE — CARE COORDINATION
[facility] Discharge Call    5/3/2023    Patient: Soo Reyes Patient : 1950   MRN: [id number]  Reason for Admission: Acute on chronic diastolic CHF  Discharge Date: 23 RARS: Readmission Risk Score: 21.3          Acute Care Course: Admission to [address] [place] [address] - for acute on chronic CHF. Diuresed with IV Lasix. She discharged to [facility]-. HFU made: PCP . Seen by neurology . Sig Hx: Hypervolemia, hx DVT, Pre-diabetes, hx of CVA with residual deficit, Cervical myopathy, Spinal stenosis of lumbar region with neurogenic claudication, Major depression, HTN, HLD    DME: Walker    Conversation: Patient states she feels good. States she was seen by neurology yesterday. She said she has to have surgery again on her neck for a dislocated disc, so she is able to hold her neck up. Surgery is scheduled for July. States she has to have cardiac clearance. She reports neck pain. She has prescribed Robaxin 750 mg threes times daily. She also takes Tylenol. She reports fatigue and sob on exertion. States she has a non productive chronic cough. BLE edema. She has zippered compression sock. States she is not wearing them d/t difficult application. She takes Lasix 40mg twice a day. States she does not monitor weight. CTN reviewed CHF zone tool. Patient advised to weight self, wear compression socks, take Lasix and elevate to prevent CHF exacerbation. Denies fever, chills, n/v. Appetite good. Elimination habits regular. Medication reviewed. Patient states she is ambulating using a walker. She requires assistance showering. She can dress herself. States her daughter gives her showers and does the laundry. Her  cooks. She has a neighbor that assist with needs as well. Patient is expecting [facility] nurse to visit today 5/3. PCP appointment . No questions or concerns. Patient thanked CTN for the call.  She is agreeable to future none

## 2023-05-05 ENCOUNTER — CARE COORDINATION (OUTPATIENT)
Dept: CARE COORDINATION | Age: 73
End: 2023-05-05

## 2023-05-05 ENCOUNTER — TELEPHONE (OUTPATIENT)
Dept: INTERNAL MEDICINE CLINIC | Age: 73
End: 2023-05-05

## 2023-05-05 NOTE — CARE COORDINATION
CTN referral  Attempting to reach patient for a follow up care coordination call regarding any needs, questions or concerns.   Gaurav Goncalvesing, [address] [facility]

## 2023-05-08 ENCOUNTER — CARE COORDINATION (OUTPATIENT)
Dept: CARE COORDINATION | Age: 73
End: 2023-05-08

## 2023-05-08 RX ORDER — POTASSIUM CHLORIDE 20 MEQ/1
TABLET, EXTENDED RELEASE ORAL
COMMUNITY
Start: 2023-04-29

## 2023-05-08 RX ORDER — TIZANIDINE 2 MG/1
TABLET ORAL
COMMUNITY
Start: 2023-04-28

## 2023-05-08 RX ORDER — ATORVASTATIN CALCIUM 10 MG/1
TABLET, FILM COATED ORAL
COMMUNITY
Start: 2023-04-15

## 2023-05-08 RX ORDER — OMEPRAZOLE 20 MG/1
CAPSULE, DELAYED RELEASE ORAL
COMMUNITY
Start: 2023-04-28

## 2023-05-08 NOTE — CARE COORDINATION
(bathing/dressing/grooming): Independent  Ability to manage Medications: Independent  Ability to prepare Food Preparation: Needs Assistance  Ability to maintain home (clean home, laundry): Needs Assistance  Ability to drive and/or has transportation: Dependent  Ability to do shopping: Needs Assistance  Ability to manage finances: Needs Assistance  Is patient able to live independently?: Yes     Current Housing: Private Residence              Are you experiencing loss of meaning?: No  Are you experiencing loss of hope and peace?: No     Thinking about your patient's physical health needs, are there any symptoms or problems (risk indicators) you are unsure about that require further investigation?: No identified areas of uncertainly or problems already being investigated   Are the patients physical health problems impacting on their mental well-being?: No identified areas of concern   Are there any problems with your patients lifestyle behaviors (alcohol, drugs, diet, exercise) that are impacting on physical or mental well-being?: No identified areas of concern   Do you have any other concerns about your patients mental well-being?  How would you rate their severity and impact on the patient?: No identified areas of concern   How do daily activities impact on the patient's well-being? (include current or anticipated unemployment, work, caregiving, access to transportation or other): Some general dissatisfaction but no concern   How would you rate their social network (family, work, friends)?: Adequate participation with social networks   How would you rate their financial resources (including ability to afford all required medical care)?: Financially secure, some resource challenges   How wells does the patient now understand their health and well-being (symptoms, signs or risk factors) and what they need to do to manage their health?: Reasonable to good understanding and already engages in managing health or is

## 2023-05-09 ENCOUNTER — OFFICE VISIT (OUTPATIENT)
Dept: INTERNAL MEDICINE CLINIC | Age: 73
End: 2023-05-09

## 2023-05-09 VITALS
SYSTOLIC BLOOD PRESSURE: 130 MMHG | WEIGHT: 200 LBS | DIASTOLIC BLOOD PRESSURE: 84 MMHG | BODY MASS INDEX: 35.43 KG/M2 | HEART RATE: 65 BPM | OXYGEN SATURATION: 97 %

## 2023-05-09 DIAGNOSIS — I50.32 CHRONIC DIASTOLIC HEART FAILURE (HCC): ICD-10-CM

## 2023-05-09 DIAGNOSIS — R22.43 LOCALIZED SWELLING OF BOTH LOWER LEGS: ICD-10-CM

## 2023-05-09 DIAGNOSIS — E53.8 VITAMIN B12 DEFICIENCY: ICD-10-CM

## 2023-05-09 DIAGNOSIS — E55.9 VITAMIN D DEFICIENCY: Primary | ICD-10-CM

## 2023-05-09 DIAGNOSIS — Z09 HOSPITAL DISCHARGE FOLLOW-UP: ICD-10-CM

## 2023-05-09 RX ORDER — FUROSEMIDE 40 MG/1
40 TABLET ORAL 3 TIMES DAILY
Qty: 90 TABLET | Refills: 3 | Status: SHIPPED | OUTPATIENT
Start: 2023-05-09

## 2023-05-09 NOTE — PROGRESS NOTES
Visit Information    Have you changed or started any medications since your last visit including any over-the-counter medicines, vitamins, or herbal medicines? no   Are you having any side effects from any of your medications? -  no  Have you stopped taking any of your medications? Is so, why? -  no    Have you seen any other physician or provider since your last visit? Yes - Records Requested  Have you had any other diagnostic tests since your last visit? No  Have you been seen in the emergency room and/or had an admission to a hospital since we last saw you? No  Have you had your routine dental cleaning in the past 6 months? no    Have you activated your [employer] account? If not, what are your barriers?  Yes     Patient Care Team:  Dennie Skillern, MD as PCP - General (Internal Medicine)  Dennie Skillern, MD as PCP - Empaneled Provider  Anais Hamming, RN as Care Transitions Nurse    Medical History Review  Past Medical, Family, and Social History reviewed and does not contribute to the patient presenting condition    Health Maintenance   Topic Date Due    Diabetic retinal exam  Never done    Shingles vaccine (1 of 2) Never done    COVID-19 Vaccine (3 - Booster for Gonzalez Peter series) 06/03/2021    Annual Wellness Visit (AWV)  12/26/2022    Diabetic foot exam  03/12/2023    Breast cancer screen  05/06/2023    Flu vaccine (Season Ended) 08/01/2023    Diabetic Alb to Cr ratio (uACR) test  11/22/2023    Lipids  11/22/2023    A1C test (Diabetic or Prediabetic)  02/21/2024    Depression Monitoring  04/11/2024    GFR test (Diabetes, CKD 3-4, OR last GFR 15-59)  05/01/2024    Colorectal Cancer Screen  10/07/2026    DTaP/Tdap/Td vaccine (2 - Td or Tdap) 02/13/2033    DEXA (modify frequency per FRAX score)  Completed    Pneumococcal 65+ years Vaccine  Completed    Hepatitis C screen  Completed    Hepatitis A vaccine  Aged Out    Hib vaccine  Aged Out    Meningococcal (ACWY) vaccine  Aged Out
tablet Take 1 tablet by mouth daily (with breakfast)      vitamin D (CHOLECALCIFEROL) 25 MCG (1000 UT) TABS tablet Take 1 tablet by mouth at bedtime      melatonin 3 MG TABS tablet Take 2 tablets by mouth nightly as needed (insomnia)      cyanocobalamin ([employer] VITAMIN B12) 1000 MCG tablet Take 1 tablet by mouth daily 30 tablet 3    Calcium Carbonate-Vitamin D 500-125 MG-UNIT TABS Take 1 tablet by mouth nightly           Medications patient taking as of now reconciled against medications ordered at time of hospital discharge: Yes    A comprehensive review of systems was negative except for what was noted in the HPI. Objective:    /84 (Site: Right Upper Arm)   Pulse 65   Wt 200 lb (90.7 kg)   SpO2 97%   BMI 35.43 kg/m²   Physical Exam  Constitutional:       General: She is not in acute distress. Appearance: She is obese. Comments: obese   HENT:      Head: Normocephalic. Nose: Nose normal.      Mouth/Throat:      Mouth: Mucous membranes are moist.   Eyes:      Extraocular Movements: Extraocular movements intact. Pupils: Pupils are equal, round, and reactive to light. Cardiovascular:      Rate and Rhythm: Normal rate and regular rhythm. Pulmonary:      Effort: No respiratory distress. Comments: B/l decreased breath sounds  Abdominal:      Palpations: Abdomen is soft. Musculoskeletal:      Right lower leg: Edema present. Left lower leg: Edema present. Comments: B/l pitting edema upto knees   Skin:     General: Skin is warm. Neurological:      General: No focal deficit present. Mental Status: She is oriented to person, place, and time. An electronic signature was used to authenticate this note.   --Nancy Harvey MD

## 2023-05-11 ENCOUNTER — CARE COORDINATION (OUTPATIENT)
Dept: CARE COORDINATION | Age: 73
End: 2023-05-11

## 2023-05-11 NOTE — CARE COORDINATION
Attempting to reach patient for a follow up care coordination call regarding any needs, questions or concerns.   Melissa Flowers, [facility] [phone]  No VM available

## 2023-05-12 ENCOUNTER — TELEPHONE (OUTPATIENT)
Dept: INTERNAL MEDICINE CLINIC | Age: 73
End: 2023-05-12

## 2023-05-12 ENCOUNTER — CARE COORDINATION (OUTPATIENT)
Dept: CARE COORDINATION | Age: 73
End: 2023-05-12

## 2023-05-12 NOTE — CARE COORDINATION
Attempting to reach patient for a follow up care coordination call regarding any needs, questions or concerns.   NACHO Giralod [phone]  No VM available

## 2023-05-19 ENCOUNTER — HOSPITAL ENCOUNTER (OUTPATIENT)
Age: 73
Setting detail: SPECIMEN
Discharge: HOME | End: 2023-05-19

## 2023-05-19 DIAGNOSIS — I50.32 CHRONIC DIASTOLIC HEART FAILURE (HCC): ICD-10-CM

## 2023-05-19 DIAGNOSIS — R22.43 LOCALIZED SWELLING OF BOTH LOWER LEGS: ICD-10-CM

## 2023-05-19 DIAGNOSIS — E53.8 VITAMIN B12 DEFICIENCY: ICD-10-CM

## 2023-05-19 DIAGNOSIS — E55.9 VITAMIN D DEFICIENCY: ICD-10-CM

## 2023-05-19 LAB
25(OH)D3 SERPL-MCNC: 28.4 NG/ML
ANION GAP SERPL CALCULATED.3IONS-SCNC: 16 MMOL/L (ref 9–17)
BUN SERPL-MCNC: 31 MG/DL (ref 8–23)
CALCIUM SERPL-MCNC: 8.8 MG/DL (ref 8.6–10.4)
CHLORIDE SERPL-SCNC: 105 MMOL/L (ref 98–107)
CO2 SERPL-SCNC: 21 MMOL/L (ref 20–31)
CREAT SERPL-MCNC: 1.09 MG/DL (ref 0.5–0.9)
FOLATE SERPL-MCNC: 16.8 NG/ML
GFR SERPL CREATININE-BSD FRML MDRD: 54 ML/MIN/1.73M2
GLUCOSE SERPL-MCNC: 77 MG/DL (ref 70–99)
POTASSIUM SERPL-SCNC: 4.3 MMOL/L (ref 3.7–5.3)
SODIUM SERPL-SCNC: 142 MMOL/L (ref 135–144)
VIT B12 SERPL-MCNC: 646 PG/ML (ref 232–1245)

## 2023-05-22 ENCOUNTER — TELEPHONE (OUTPATIENT)
Dept: INTERNAL MEDICINE CLINIC | Age: 73
End: 2023-05-22

## 2023-05-22 ENCOUNTER — OFFICE VISIT (OUTPATIENT)
Dept: INTERNAL MEDICINE CLINIC | Age: 73
End: 2023-05-22

## 2023-05-22 VITALS
BODY MASS INDEX: 35.43 KG/M2 | WEIGHT: 200 LBS | SYSTOLIC BLOOD PRESSURE: 126 MMHG | HEART RATE: 75 BPM | DIASTOLIC BLOOD PRESSURE: 80 MMHG | OXYGEN SATURATION: 98 %

## 2023-05-22 DIAGNOSIS — G25.81 RLS (RESTLESS LEGS SYNDROME): Primary | ICD-10-CM

## 2023-05-22 DIAGNOSIS — R13.10 DYSPHAGIA, UNSPECIFIED TYPE: ICD-10-CM

## 2023-05-22 DIAGNOSIS — I82.4Y2 ACUTE DEEP VEIN THROMBOSIS (DVT) OF PROXIMAL VEIN OF LEFT LOWER EXTREMITY (HCC): ICD-10-CM

## 2023-05-22 DIAGNOSIS — I82.401 DEEP VEIN THROMBOSIS (DVT) OF RIGHT LOWER EXTREMITY, UNSPECIFIED CHRONICITY, UNSPECIFIED VEIN (HCC): ICD-10-CM

## 2023-05-22 DIAGNOSIS — I10 PRIMARY HYPERTENSION: ICD-10-CM

## 2023-05-22 DIAGNOSIS — I10 ESSENTIAL HYPERTENSION: ICD-10-CM

## 2023-05-22 DIAGNOSIS — I50.33 ACUTE ON CHRONIC DIASTOLIC HEART FAILURE (HCC): ICD-10-CM

## 2023-05-22 RX ORDER — CARVEDILOL 12.5 MG/1
12.5 TABLET ORAL 2 TIMES DAILY
Qty: 60 TABLET | Refills: 0 | Status: SHIPPED | OUTPATIENT
Start: 2023-05-22 | End: 2023-05-22 | Stop reason: SDUPTHER

## 2023-05-22 RX ORDER — AMLODIPINE BESYLATE 5 MG/1
5 TABLET ORAL DAILY
Qty: 90 TABLET | OUTPATIENT
Start: 2023-05-22

## 2023-05-22 RX ORDER — CARVEDILOL 12.5 MG/1
TABLET ORAL
Qty: 180 TABLET | OUTPATIENT
Start: 2023-05-22

## 2023-05-22 RX ORDER — AMLODIPINE BESYLATE 5 MG/1
5 TABLET ORAL DAILY
Qty: 30 TABLET | Refills: 0 | Status: SHIPPED | OUTPATIENT
Start: 2023-05-22

## 2023-05-22 RX ORDER — CARVEDILOL 12.5 MG/1
25 TABLET ORAL 2 TIMES DAILY
Qty: 60 TABLET | Refills: 0 | Status: SHIPPED | OUTPATIENT
Start: 2023-05-22 | End: 2023-06-21

## 2023-05-22 RX ORDER — PANTOPRAZOLE SODIUM 40 MG/1
40 TABLET, DELAYED RELEASE ORAL
Qty: 90 TABLET | Refills: 3 | Status: SHIPPED | OUTPATIENT
Start: 2023-05-22

## 2023-05-22 RX ORDER — PRAMIPEXOLE DIHYDROCHLORIDE 0.5 MG/1
0.5 TABLET ORAL NIGHTLY
Qty: 90 TABLET | Refills: 3 | Status: SHIPPED | OUTPATIENT
Start: 2023-05-22 | End: 2023-05-22 | Stop reason: SDUPTHER

## 2023-05-22 RX ORDER — PRAMIPEXOLE DIHYDROCHLORIDE 0.5 MG/1
0.5 TABLET ORAL NIGHTLY
Qty: 30 TABLET | Refills: 3 | Status: SHIPPED | OUTPATIENT
Start: 2023-05-22

## 2023-05-22 RX ORDER — FAMOTIDINE 20 MG
1 TABLET ORAL DAILY
Qty: 60 CAPSULE | Refills: 0 | Status: SHIPPED | OUTPATIENT
Start: 2023-05-22

## 2023-05-22 NOTE — PROGRESS NOTES
[address] [address]  Dept: [phone]  Dept Fax: [phone]    Office Progress/Follow Up Note  Date of patient's visit: 5/22/2023  Patient's Name:  Kailee Ledbetter YOB: 1950            Patient Care Team:  Emile Khalil MD as PCP - General (Internal Medicine)  Emile Khalil MD [facility] PCP - [facility]  Latosha Pink RN as Care Transitions Nurse    REASON FOR VISIT: Routine outpatient follow up/Same day visit/Post hospital/ED visit    HISTORY OF PRESENT ILLNESS:      Chief Complaint   Patient presents with    Diabetes     1 week follow up         History was obtained from the patient.  Kailee Ledbetter is a 68 y.o. is here for a follow-up, does have multiple comorbidities including history of heart failure with reduced ejection, history of hypertension, chronic DVT, history of stroke with history left-sided weakness,  Was recently hospitalized for acute on chronic heart failure, was seen by me last week and I increase the dose of Lasix  Patient came today for a follow-up  Continues to have dyspnea on exertion states that is been stable since last 1 week,  Weight is 202 pounds today, does report checking weight at home, was 196 a week ago,  Patient states that she thinks that she is feeling well, urinating every couple of hours, also drinking 3 L of water with 2 large cups of coffee and 1 glass of juice every day ,  was also present,  Also complaining of some cough when she swallows, as per  she eats with her neck bent down,  Cough even with liquid and solid    No chest pain no worsening dyspnea  Patient advised to cut down on fluid intake 5000 mL    Patient is appointment with cardiologist in 3weeks     Patient Active Problem List   Diagnosis    Other specified disorders of rotator cuff syndrome of shoulder and allied disorders    Diverticulosis of large intestine    Intestinal or peritoneal adhesions with obstruction

## 2023-05-22 NOTE — PROGRESS NOTES
Visit Information    Have you changed or started any medications since your last visit including any over-the-counter medicines, vitamins, or herbal medicines? no   Are you having any side effects from any of your medications? -  no  Have you stopped taking any of your medications? Is so, why? -  no    Have you seen any other physician or provider since your last visit? No  Have you had any other diagnostic tests since your last visit? No  Have you been seen in the emergency room and/or had an admission to a hospital since we last saw you? No  Have you had your routine dental cleaning in the past 6 months? no    Have you activated your [employer] account? If not, what are your barriers?  Yes     Patient Care Team:  Marcy Fothergill, MD as PCP - General (Internal Medicine)  Marcy Fothergill, MD as PCP - Empaneled Provider  Tika Zamora RN as Care Transitions Nurse    Medical History Review  Past Medical, Family, and Social History reviewed and does not contribute to the patient presenting condition    Health Maintenance   Topic Date Due    Diabetic retinal exam  Never done    Shingles vaccine (1 of 2) Never done    COVID-19 Vaccine (3 - Booster for Gonzalez Peter series) 06/03/2021    Annual Wellness Visit (AWV)  12/26/2022    Diabetic foot exam  03/12/2023    Breast cancer screen  05/06/2023    Flu vaccine (Season Ended) 08/01/2023    Diabetic Alb to Cr ratio (uACR) test  11/22/2023    Lipids  11/22/2023    A1C test (Diabetic or Prediabetic)  02/21/2024    Depression Monitoring  04/11/2024    GFR test (Diabetes, CKD 3-4, OR last GFR 15-59)  05/19/2024    Colorectal Cancer Screen  10/07/2026    DTaP/Tdap/Td vaccine (2 - Td or Tdap) 02/13/2033    DEXA (modify frequency per FRAX score)  Completed    Pneumococcal 65+ years Vaccine  Completed    Hepatitis C screen  Completed    Hepatitis A vaccine  Aged Out    Hib vaccine  Aged Out    Meningococcal (ACWY) vaccine  Aged Out

## 2023-05-23 ENCOUNTER — CARE COORDINATION (OUTPATIENT)
Dept: CARE COORDINATION | Age: 73
End: 2023-05-23

## 2023-05-23 NOTE — CARE COORDINATION
Remote Patient Kit Ordering Note      Date/Time:  5/23/2023 1:58 PM      [x] CCSS confirmed patient shipping address  [x] Patient will receive package over the next 2-4 business days. Someone 21 years or older must be present to sign for UPS delivery. [x] Patient to contact virtual installation-specific phone number listed in the patient instructions. [x] If the patient does not contact HRS within 24 hours, an [employer] Ambassador [facility] will call the patient directly: If the patient does not answer, HRS will follow up with the clinical team notifying them about the unsuccessful attempt to contact the patient. HRS will make three call attempts to the patient. [x] ACM will contact patient once equipment is active to welcome them to the program.                                                         [] Hours of RPM monitoring - Monday-Friday [id number]                     All questions answered at this time. ACM made aware the RPM kit has been ordered. CCSS notified patient of RPM equipment order.

## 2023-05-23 NOTE — CARE COORDINATION
Heart Failure Education outreach Date/Time: 2023 1:37 PM    Ambulatory Care Manager (ACM) contacted the patient by telephone to perform Ambulatory Care Coordination. Verified name and  with patient as identifiers. Provided introduction to self, and explanation of the Ambulatory Care Manager's role. ACM reviewed that a Health Healthy tips for the Summer packet has been sent to [employer]. ACM reviewed CHF zones, daily weights, fluid restriction, the importance of low sodium diet, and healthy tips packet with the patient. Instructed patient to call their PCP if they have a weight gain of 3 lbs in 2 days or 5 lbs in a week. Patient reminded that there is a physician on call 24 hours a day / 7 days a week should the patient have questions or concerns. The patient verbalized understanding. Ambulatory Care Coordination Note  2023    Patient Current Location:  Home: [address] [place] [id number]     ACM contacted the patient by telephone. Verified name and  with patient as identifiers. Provided introduction to self, and explanation of the ACM role. Challenges to be reviewed by the provider   Additional needs identified to be addressed with provider: No  none               Method of communication with provider: none. Date Care Coordination Episode Started:  23      Reason patient is in Care Coordination:     CTN referral    Topics Discussed Today:     CHF, swelling in feet per patient, no increased SOB. DM, no concerns today. Patient stated she is having a swallow study done, she is having trouble choking on food and swallowing at times. Patient stated that she is scared. ACM recommended sticking to soft foods until the test is completed. Assessments completed today:     Fall risk  Initial assessment  Medication reconciliation  SDOH  CHF, DM, HTN (Health assessments)      Care Coordinator plan of care: Follow up in 1 week, continue education and support.

## 2023-05-24 ENCOUNTER — TELEPHONE (OUTPATIENT)
Dept: INTERNAL MEDICINE CLINIC | Age: 73
End: 2023-05-24

## 2023-05-24 DIAGNOSIS — N18.30 TYPE 2 DIABETES MELLITUS WITH STAGE 3 CHRONIC KIDNEY DISEASE, UNSPECIFIED WHETHER LONG TERM INSULIN USE, UNSPECIFIED WHETHER STAGE 3A OR 3B CKD (HCC): ICD-10-CM

## 2023-05-24 DIAGNOSIS — I50.33 ACUTE ON CHRONIC DIASTOLIC (CONGESTIVE) HEART FAILURE (HCC): Primary | ICD-10-CM

## 2023-05-24 DIAGNOSIS — E11.22 TYPE 2 DIABETES MELLITUS WITH STAGE 3 CHRONIC KIDNEY DISEASE, UNSPECIFIED WHETHER LONG TERM INSULIN USE, UNSPECIFIED WHETHER STAGE 3A OR 3B CKD (HCC): ICD-10-CM

## 2023-05-24 DIAGNOSIS — I10 PRIMARY HYPERTENSION: ICD-10-CM

## 2023-05-24 NOTE — PROGRESS NOTES
5/24/23 4:57 AM       Remote Patient Monitoring Treatment Plan    Received request from ACNAVID/DANYELL Francisco RN  to order remote patient monitoring for in home monitoring of CHF, Diabetes, and HTN and order completed. Patient will be monitoring blood pressure   glucose  pulse ox   weight. Patient will engage in Remote Patient Monitoring each day to develop the skills necessary for self management. RPM Care Team Responsibilities:   Alerts will be reviewed daily and addressed within 2-4 hours during operational hours (Monday -Friday 9 am-4 pm)  Alert response and intervention documented in patient medical record  Alert response escalated to PCP per protocol and documented in patient medical record  Patient monitored over approximately  days  Discharge from program based on self-management readiness    See care coordination encounters for additional details.     Imelda Lira DNP, FNP-C, Remote Patient Monitoring NP, (Ph) [phone]

## 2023-05-26 ENCOUNTER — HOSPITAL ENCOUNTER (OUTPATIENT)
Dept: GENERAL RADIOLOGY | Age: 73
End: 2023-05-26
Attending: INTERNAL MEDICINE
Payer: MEDICARE

## 2023-05-26 DIAGNOSIS — R13.10 DYSPHAGIA, UNSPECIFIED TYPE: ICD-10-CM

## 2023-05-26 PROCEDURE — 92611 MOTION FLUOROSCOPY/SWALLOW: CPT

## 2023-05-26 PROCEDURE — 74230 X-RAY XM SWLNG FUNCJ C+: CPT

## 2023-05-26 NOTE — PROCEDURES
Swallowing Strategies : Eat/Feed slowly;Upright as possible for all oral intake;Small bites/sips              Recommendations/Treatment  Requires SLP Intervention: Yes                  Recommended Exercises:       Tongue base strengthening       Education: Images, results and recommendations were reviewed with pt. And her  following this exam.   Patient Education: Pt.  present, verbalized understanding  Patient Education Response: Verbalizes understanding  Written information given re: moderately thick liquids. Pain       None reported      Therapy Time:   Individual Concurrent Group Co-treatment   Time In 1400         Time Out 1415         Minutes [facility]. ANickieCCC/SLP    5/26/2023, 2:32 PM

## 2023-05-30 ENCOUNTER — TELEPHONE (OUTPATIENT)
Dept: INTERNAL MEDICINE CLINIC | Age: 73
End: 2023-05-30

## 2023-05-30 ENCOUNTER — CARE COORDINATION (OUTPATIENT)
Dept: CARE COORDINATION | Age: 73
End: 2023-05-30

## 2023-05-30 DIAGNOSIS — R13.10 DYSPHAGIA, UNSPECIFIED TYPE: Primary | ICD-10-CM

## 2023-05-30 DIAGNOSIS — B37.31 CANDIDA VAGINITIS: Primary | ICD-10-CM

## 2023-05-30 RX ORDER — FLUCONAZOLE 150 MG/1
150 TABLET ORAL
Qty: 2 TABLET | Refills: 0 | Status: ON HOLD | OUTPATIENT
Start: 2023-05-30

## 2023-05-30 NOTE — CARE COORDINATION
Ambulatory Care Coordination Note  2023    Patient Current Location:  Home: [place] [address]     ACM contacted the patient by telephone. Verified name and  with patient as identifiers. Provided introduction to self, and explanation of the ACM role. Challenges to be reviewed by the provider   Additional needs identified to be addressed with provider: No  none               Method of communication with provider: none. Date Care Coordination Episode Started: 23       Reason patient is in Care Coordination:     CTN referral    Topics Discussed Today:     DM, no concerns today. CHF, no reported concerns. RPM, patient has equipment and plans to set it up soon. Yeast infection, patient stated she thinks she has a yeast infection but has a message out to PCP. PCP note, Barium swallow showed aspiration, will place order for speech therapy. Patient stated she has speech therapy already. No other needs today per patient. Assessments completed today:     Fall risk  Initial assessment  Medication reconciliation  SDOH  CHF, DM (Health assessments)      Care Coordinator plan of care: Follow up call in 1 week, continue education and support. Offered patient enrollment in the Remote Patient Monitoring (RPM) program for in-home monitoring: Yes, patient already enrolled. Care Coordination Interventions    Referral from Primary Care Provider: No  Suggested Interventions and Community Resources  Fall Risk Prevention: In Process  Home Health Services: Completed (Comment: alexandra)  Physical Therapy: Completed  Transportation Support: Declined  Zone Management Tools: In Process          Goals Addressed                   This Visit's Progress     Conditions and Symptoms   Improving     I will schedule office visits, as directed by my provider. I will keep my appointment or reschedule if I have to cancel.   I will notify my provider of any barriers to my plan of

## 2023-05-31 ENCOUNTER — CARE COORDINATION (OUTPATIENT)
Dept: CARE COORDINATION | Age: 73
End: 2023-05-31

## 2023-05-31 NOTE — CARE COORDINATION
Remote Patient Monitoring Welcome Note  Date/Time:  2023 1:00 PM  Patient Current Location: Home: [facility] [place] [facility] [address]  Verified patients name and  as identifiers. Completed and confirmed the following:   Emergency Contact: Allen Aviles [phone] daughter  [x] Patient received all RPM equipment (tablet, scale, blood pressure device and cuff, and pulse oximeter)  Cuff Size: regular (9.05\"-15.74\")    Weight Scale:  regular (<330lbs)                    [x] Instructed patient keep box for use when returning equipment                                                          [x] Reviewed Patient Welcome Letter with patient                         [x] Reviewed expectations for patient and care team  Monitoring hours M-F 9-4pm  Completing monitoring by 12pm on  so that alerts can be responded to in the same day  Patient weighs self at same time every day (or after urinating and waking up)  Take blood pressure 1-2 hrs after medications   RPM team may have different phone area code (including [facility], [facility], [facility] or [address])                              [x] Instructed patient to keep scale on flat surface                                                         [x] Instructed patient to keep tablet plugged in at all times                         [x] Instructed how to contact IT support ([phone])  [x] Provided Remote Patient Monitoring care  information               All questions answered at this time.

## 2023-06-01 NOTE — PROGRESS NOTES
6/1/23 1:32 PM     Remote Patient Monitoring Change to Monitoring Parameters    Patient currently being managed with remote patient monitoring for CHF, Diabetes, and HTN. Request received to delete DM as RPM disease preset and deactivate glucose monitoring in order to accommodate patient's baseline measurements, or associated changes in patient's status. [facility] has informed us that pt does not check blood sugars at home and her DM is monitored via A1c's. RPM care plan has been updated to reflect these changes. Pt will continue to be offered monitoring of BP, pulse ox, HR and weight. See care coordination encounters for additional details.      Reyes Snow DNP, FNP-C, Remote Patient Monitoring NP, (ph) [phone]

## 2023-06-05 ENCOUNTER — CARE COORDINATION (OUTPATIENT)
Dept: CARE COORDINATION | Age: 73
End: 2023-06-05

## 2023-06-05 RX ORDER — POTASSIUM CHLORIDE 20 MEQ/1
20 TABLET, EXTENDED RELEASE ORAL DAILY
Qty: 90 TABLET | Refills: 0 | Status: SHIPPED | OUTPATIENT
Start: 2023-06-05

## 2023-06-05 RX ORDER — ATORVASTATIN CALCIUM 10 MG/1
10 TABLET, FILM COATED ORAL DAILY
Qty: 90 TABLET | Refills: 0 | Status: SHIPPED | OUTPATIENT
Start: 2023-06-05

## 2023-06-05 NOTE — CARE COORDINATION
denies any worsening of leg swellling, in fact she notes improvement in her right leg. She states her daughter recently wrapped the left leg. Weight alert looks to originate from an abnormally low weight of 191.2lb on Friday. No need for escalation at this time. Will continue to monitor. Will update ACM    Plan/Follow Up: Will continue to review, monitor and address alerts with follow up based on severity of symptoms and risk factors.     Ryan Dorado LPN  [address]/ CTN/ Remote Patient monitoring  [phone]

## 2023-06-06 ENCOUNTER — CARE COORDINATION (OUTPATIENT)
Dept: CARE COORDINATION | Age: 73
End: 2023-06-06

## 2023-06-06 NOTE — CARE COORDINATION
Remote Alert Monitoring Note      Date/Time:  6/6/2023 8:43 AM    Challenges to be reviewed by the provider   Additional needs identified to be addressed with provider No                  Background: : High Blood-Pressure, CHF    Refer to 911 immediately if:  Patient unresponsive or unable to provide history  Change in cognition or sudden confusion  Patient unable to respond in complete sentences  Intense chest pain/tightness  Any concern for any clinical emergency  Red Alert: Provider response time of 1 hr required for any red alert requiring intervention  Yellow Alert: Provider response time of 3hr required for any escalated yellow alert      Clinical Interventions: Reviewed and followed up on alerts and treatments-.       Plan/Follow Up: Will continue to review, monitor and address alerts with follow up based on severity of symptoms and risk factors. Spoke with patient yesterday and no issues were noted. Weight alert looks to originate from an abnormally low weight of 191.2lb on Friday. Patients weight is currently at baseline. No need for escalation at this time. Will continue to monitor.     Debo Macdonald LPN  [address]/ CTN/ Remote Patient monitoring  [phone]

## 2023-06-07 ENCOUNTER — CARE COORDINATION (OUTPATIENT)
Dept: CARE COORDINATION | Age: 73
End: 2023-06-07

## 2023-06-07 NOTE — CARE COORDINATION
Remote Alert Monitoring Note      Date/Time:  6/7/2023 8:44 AM    Challenges to be reviewed by the provider    Additional needs identified to be addressed with provider No                       Background: : High Blood-Pressure, CHF     Refer to 911 immediately if:  Patient unresponsive or unable to provide history  Change in cognition or sudden confusion  Patient unable to respond in complete sentences  Intense chest pain/tightness  Any concern for any clinical emergency  Red Alert: Provider response time of 1 hr required for any red alert requiring intervention  Yellow Alert: Provider response time of 3hr required for any escalated yellow alert        Clinical Interventions: Reviewed and followed up on alerts and treatments-.         Plan/Follow Up: Will continue to review, monitor and address alerts with follow up based on severity of symptoms and risk factors. Spoke with patient the other day and no issues were noted. Weight alert looks to originate from an abnormally low weight of 191.2lb on Friday. Patients weight is currently at baseline. No need for escalation at this time. Will continue to monitor.       Char Kelly LPN  [facility]/ CTN/ Remote Patient monitoring  [phone]

## 2023-06-07 NOTE — CARE COORDINATION
Ambulatory Care Coordination Note  2023    Patient Current Location:  Home: [address] [id number]     ACM contacted the patient by telephone. Verified name and  with patient as identifiers. Provided introduction to self, and explanation of the ACM role. Challenges to be reviewed by the provider   Additional needs identified to be addressed with provider: No  none               Method of communication with provider: none. Date Care Coordination Episode Started: 23       Reason patient is in Care Coordination: CTN referral        Topics Discussed Today:     Medications, no needs per patient. DM, no concerns. CHF, per patient she is feeling well, no concerns. RPM, going well. Yeast infection, still having issues, plans to try OTC medication. Prescribed medication did not seem to help per patient. Assessments completed today:     Fall risk  Initial assessment  Medication reconciliation  SDOH  CHF, DM, HTN (Health assessments)      Care Coordinator plan of care: Follow up call in 1-2 weeks, continue education and support. Offered patient enrollment in the Remote Patient Monitoring (RPM) program for in-home monitoring: Yes, patient already enrolled. Care Coordination Interventions    Referral from Primary Care Provider: No  Suggested Interventions and Community Resources  Fall Risk Prevention: In Process  Home Health Services: Completed (Comment: [facility])  Physical Therapy: Completed  Transportation Support: Declined  Zone Management Tools: In Process          Goals Addressed                   This Visit's Progress     Conditions and Symptoms   Improving     I will schedule office visits, as directed by my provider. I will keep my appointment or reschedule if I have to cancel. I will notify my provider of any barriers to my plan of care. I will follow my Zone Management tool to seek urgent or emergent care.   I will notify my provider of any symptoms that indicate

## 2023-06-08 ENCOUNTER — CARE COORDINATION (OUTPATIENT)
Dept: CARE COORDINATION | Age: 73
End: 2023-06-08

## 2023-06-08 NOTE — CARE COORDINATION
speaking in full sentences. Patient states she weighed the same as she always does and denies any SOB or changes in her swelling. She was able to reweigh herself while on the call and got a better reading of 199.8lb. Original weight alert stemmed from  an abnormally low weight of 191.2lb on Friday. Patients weight is currently at baseline. No need for escalation at this time. Will continue to monitor. Plan/Follow Up: Will continue to review, monitor and address alerts with follow up based on severity of symptoms and risk factors.     [facility] [address] [place] [address]/ CTN/ Remote Patient monitoring  [phone]

## 2023-06-16 ENCOUNTER — TELEPHONE (OUTPATIENT)
Dept: INTERNAL MEDICINE CLINIC | Age: 73
End: 2023-06-16

## 2023-06-20 ENCOUNTER — CARE COORDINATION (OUTPATIENT)
Dept: CARE COORDINATION | Age: 73
End: 2023-06-20

## 2023-06-20 NOTE — CARE COORDINATION
Remote Patient Monitoring Note      Date/Time:  6/20/2023 8:34 AM    LPN attempted to contact patient by telephone regarding red alert received for weight increase (199.4lb). Background: High Blood-Pressure, CHF    Clinical Interventions:   HIPAA compliant message left on  requesting a return call. Plan/Follow Up: Will continue to review, monitor and address alerts with follow up based on severity of symptoms and risk factors.        Jocelin Kearns LPN  [facility]/ CTN/ Remote Patient Monitoring  [phone]

## 2023-06-21 ENCOUNTER — CARE COORDINATION (OUTPATIENT)
Dept: CARE COORDINATION | Age: 73
End: 2023-06-21

## 2023-06-21 NOTE — CARE COORDINATION
Ambulatory Care Coordination Note  2023    Patient Current Location:  Home: [place] [id number]     ACM contacted the patient by telephone. Verified name and  with patient as identifiers. Provided introduction to self, and explanation of the ACM role. Challenges to be reviewed by the provider   Additional needs identified to be addressed with provider: No  none               Method of communication with provider: none. Date Care Coordination Episode Started:  23      Reason patient is in Care Coordination:     CTN REFERRAL    Topics Discussed Today:     Medications, no needs per patient. DM, no concerns, per patient she is not diabetic. CHF, per patient she is feeling well, no swelling or SOB. Difficulty walking today, more than normal. Patient stated no increased pain, she did not do anything to cause it, maybe she needs to rest. ACM encouraged her to call or schedule an appointment with PCP if this does not resolve with rest. Verified that she is using a walker. RPM, going well. Last readings:  Blood Pressure: 162/73, 70bpm Pulseox: 96%, 75bpm Survey: - Weight: 198.8lbs Note Created at: 2023 08:28 AM       Assessments completed today:     Fall risk  Initial assessment  Medication reconciliation  SDOH  CHF (Health assessments)      Care Coordinator plan of care: Follow up call in 1-2 weeks, continue to educate and support. Offered patient enrollment in the Remote Patient Monitoring (RPM) program for in-home monitoring: Yes, patient already enrolled. Care Coordination Interventions    Referral from Primary Care Provider: No  Suggested Interventions and Community Resources  Fall Risk Prevention: In Process  Home Health Services: Completed (Comment: [facility])  Physical Therapy: Completed  Transportation Support: Declined  Zone Management Tools:  In Process          Goals Addressed                   This Visit's Progress     Conditions and Symptoms

## 2023-06-23 ENCOUNTER — CARE COORDINATION (OUTPATIENT)
Dept: CARE COORDINATION | Age: 73
End: 2023-06-23

## 2023-06-23 NOTE — CARE COORDINATION
Remote Alert Monitoring Note      Date/Time:  2023 11:59 AM  Patient Current Location: [address]: [address] [address]    LPN contacted patient by telephone regarding red alert received for weight increase (192.6). Verified patients name and  as identifiers. Challenges to be reviewed by the provider   Additional needs identified to be addressed with provider No                  Background: High Blood-Pressure, CHF    Refer to 911 immediately if:  Patient unresponsive or unable to provide history  Change in cognition or sudden confusion  Patient unable to respond in complete sentences  Intense chest pain/tightness  Any concern for any clinical emergency  Red Alert: Provider response time of 1 hr required for any red alert requiring intervention  Yellow Alert: Provider response time of 3hr required for any escalated yellow alert    Weight Scale Triage  Was your weight obtained upon rising/waking today? yes   Was your weight obtained after voiding and/or use of the bathroom today? yes   Did you weigh yourself in the same amount of clothing today, compared to how you typically do? yes   Was the scale bumped or moved prior to today's weight? no   Is your scale on a flat/hard surface? yes   Did you obtain your weight with shoes on? no   If yes, is this something you normally do during your daily weights? yes   Were you standing up straight on the scale today? yes   Were you leaning on anything while obtaining your weight today? no       Weight Triage  Are you weighing any different than you did yesterday? (time of day, clothes and shoes on or off, etc)? no   Do you have any shortness of breath? no   Do you have any swelling in your hands of feet? baseline   Are you having any other health concerns or issues? no       Clinical Interventions: Reviewed and followed up on alerts and treatments-. Pt is asymptomatic and in no apparent distress, speaking in full sentences.   Discussed with patient that she

## 2023-06-26 ENCOUNTER — CARE COORDINATION (OUTPATIENT)
Dept: CARE COORDINATION | Age: 73
End: 2023-06-26

## 2023-06-27 DIAGNOSIS — F33.1 MAJOR DEPRESSIVE DISORDER, RECURRENT, MODERATE (HCC): ICD-10-CM

## 2023-06-27 DIAGNOSIS — I10 ESSENTIAL HYPERTENSION: ICD-10-CM

## 2023-06-27 RX ORDER — CARVEDILOL 12.5 MG/1
25 TABLET ORAL 2 TIMES DAILY
Qty: 60 TABLET | Refills: 0 | Status: SHIPPED | OUTPATIENT
Start: 2023-06-27 | End: 2023-07-27

## 2023-06-27 RX ORDER — BUSPIRONE HYDROCHLORIDE 5 MG/1
5 TABLET ORAL 3 TIMES DAILY
Qty: 90 TABLET | Refills: 3 | Status: SHIPPED | OUTPATIENT
Start: 2023-06-27

## 2023-06-28 ENCOUNTER — CARE COORDINATION (OUTPATIENT)
Dept: CARE COORDINATION | Age: 73
End: 2023-06-28

## 2023-06-29 ENCOUNTER — CARE COORDINATION (OUTPATIENT)
Dept: CARE COORDINATION | Age: 73
End: 2023-06-29

## 2023-06-30 ENCOUNTER — CARE COORDINATION (OUTPATIENT)
Dept: CARE COORDINATION | Age: 73
End: 2023-06-30

## 2023-06-30 NOTE — PROGRESS NOTES
[facility] Internal Medicine    Progress Note     6/9/2022    11:05 AM    Name:   Mabel Silva  MRN:     [id number]     Acct:      [de-identified]   Room:   2064/2064-01   Day:  4  Admit Date:  6/5/2022  1:47 PM    PCP:   Carlee Vaca MD  Code Status:  Full Code    Subjective:     C/C:   Chief Complaint   Patient presents with    Leg Swelling     Principal Problem:    Cellulitis of both lower extremities  Active Problems:    Stage 3 chronic kidney disease ([facility] [address]    Type 2 diabetes mellitus with circulatory disorder, without long-term current use of insulin ([facility] [address]  Resolved Problems:    * No resolved hospital problems. *      6/9/22    Vitals stable, afebrile. No acute events overnight  Pre-CERT started yesterday, patient will likely be discharged today or tomorrow                 Significant last 24 hr data reviewed ;   Vitals:    06/08/22 2207 06/08/22 2343 06/09/22 0518 06/09/22 0631   BP: (!) 158/74 (!) 107/46  120/63   Pulse: 71 61  62   Resp:  20  20   Temp:  98.2 °F (36.8 °C)  98.2 °F (36.8 °C)   TempSrc:       SpO2:  96%  95%   Weight:   185 lb 3 oz (84 kg)    Height:          Recent Results (from the past 24 hour(s))   CBC    Collection Time: 06/09/22  6:00 AM   Result Value Ref Range    WBC 5.2 3.5 - 11.0 k/uL    RBC 3.43 (L) 4.0 - 5.2 m/uL    Hemoglobin 10.9 (L) 12.0 - 16.0 g/dL    Hematocrit 32.7 (L) 36 - 46 %    MCV 95.5 80 - 100 fL    MCH 31.7 26 - 34 pg    MCHC 33.2 31 - 37 g/dL    RDW 13.5 11.5 - 14.9 %    Platelets 309 061 - 488 k/uL    MPV 6.7 6.0 - 12.0 fL     No results for input(s): POCGLU in the last 72 hours.    CT Head WO Contrast    Result Date: 6/5/2022  EXAMINATION: CT OF THE HEAD WITHOUT CONTRAST  6/5/2022 2:31 pm TECHNIQUE: CT of the head was performed without the administration of intravenous contrast. Automated exposure control, iterative reconstruction, and/or weight based adjustment of the mA/kV was utilized to reduce the radiation dose to as low as reasonably achievable. COMPARISON: None. HISTORY: ORDERING SYSTEM PROVIDED HISTORY: fall, head injury TECHNOLOGIST PROVIDED HISTORY: fall, head injury Decision Support Exception - unselect if not a suspected or confirmed emergency medical condition->Emergency Medical Condition (MA) Reason for Exam: fall, head injury Additional signs and symptoms: Pt states fall with head injury a few days ago FINDINGS: BRAIN/VENTRICLES: There is no acute intracranial hemorrhage, mass effect or midline shift. No abnormal extra-axial fluid collection. The gray-white differentiation is maintained without evidence of an acute infarct. There is no evidence of hydrocephalus. ORBITS: The visualized portion of the orbits demonstrate no acute abnormality. SINUSES: The visualized paranasal sinuses and mastoid air cells demonstrate no acute abnormality. 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 SOFT TISSUES/SKULL:  No acute abnormality of the visualized skull or soft tissues. No acute intracranial abnormality. CT Cervical Spine WO Contrast    Result Date: 6/5/2022  EXAMINATION: CT OF THE CERVICAL SPINE WITHOUT CONTRAST 6/5/2022 11:33 am TECHNIQUE: CT of the cervical spine was performed without the administration of intravenous contrast. Multiplanar reformatted images are provided for review. Automated exposure control, iterative reconstruction, and/or weight based adjustment of the mA/kV was utilized to reduce the radiation dose to as low as reasonably achievable.  COMPARISON: 03/16/2022 HISTORY: ORDERING SYSTEM PROVIDED HISTORY: fall, neck pain TECHNOLOGIST PROVIDED HISTORY: fall, neck pain Decision Support Exception - unselect if not a suspected or confirmed emergency medical condition->Emergency Medical Condition (MA) Reason for Exam: fall, head injury Additional signs and symptoms: Pt states fall a few days ago and complains of neck pain FINDINGS: BONES/ALIGNMENT: There is stable U3-V2 posterior metallic fusion with associated laminectomies at this level. No evidence of instrumentation loosening or failure. Stable reversal of the normal cervical lordosis. No fracture or osseous destructive lesion. DEGENERATIVE CHANGES: Multilevel degenerative disc disease is noted in the cervical spine which is mild in severity. This is greatest at C6-C7. SOFT TISSUES: Vascular calcifications are noted along the aorta. The lung apices are clear. The thyroid gland is heterogeneous. There is a right-sided thyroid nodule that is low in attenuation measuring 4 mm, benign. No follow-up imaging is required. Vascular calcifications are noted in the carotid bulbs. No acute osseous abnormality of the cervical spine. No significant change from prior exam     XR CHEST PORTABLE    Result Date: 6/5/2022  EXAMINATION: ONE XRAY VIEW OF THE CHEST 6/5/2022 2:15 pm COMPARISON: April 8, 2022 HISTORY: ORDERING SYSTEM PROVIDED HISTORY: shortness of breath TECHNOLOGIST PROVIDED HISTORY: shortness of breath Reason for Exam: Shortness of breath FINDINGS: The lungs are without acute focal process. There is no effusion or pneumothorax. The cardiomediastinal silhouette is without acute process. The osseous structures are without acute process. No acute process.      VL Lower Extremity Bilateral Venous Duplex    Result Date: 6/5/2022    [address] [place] [address]  Vascular Lower Extremities DVT Study Procedure   Patient Name   Santino Hdez       Date of Study           06/05/2022                 [place]   Date of Birth  1950  Gender                  Female   Age            71 year(s)  Race                       Room Number    SANDIP   Corporate ID # [id number]   Patient Acct # [de-identified]   MR #           [id number]      Sonographer             Jordan Carnes   Accession #    [id number]  Interpreting Physician  Darleen Green   Referring                  Referring Physician     Charlanne Bence  Nurse  Practitioner Procedure Type of Study:   Veins: Lower Extremities DVT Study, Venous Scan Lower Bilateral.  Indications for Study:Leg Swelling. Patient Status:ER. Technical Quality:Limited visualization. Limitation reason:swelling. Comments: Indications: leg swelling, wells score of 4. Patient indicates she is on Eliquis twice a day and has a history of cellulitis. Conclusions   Summary   No evidence of superficial or deep venous thrombosis in both lower  extremities. Signature   ----------------------------------------------------------------  Electronically signed by Jordan Carnes(Sonographer) on  06/05/2022 08:33 PM  ----------------------------------------------------------------   ----------------------------------------------------------------  Electronically signed by Simmie Laurel Reyes,Arthur(Interpreting  physician) on 06/05/2022 09:59 PM  ----------------------------------------------------------------  Findings:   Right Impression:                    Left Impression:   The common femoral, proximal         The common femoral, proximal femoral,  femoral, and popliteal veins         and popliteal veins demonstrate  demonstrate normal compressibility. normal compressibility. Normal compressibility of the great  Normal compressibility of the great  saphenous vein. saphenous vein. Normal compressibility of the small  Normal compressibility of the small  saphenous vein. saphenous vein. Velocities are measured in cm/s ; Diameters are measured in cm Right Lower Extremities DVT Study Measurements Right 2D Measurements +------------------------------------+----------+---------------+----------+ ! Location                            ! Visualized! Compressibility! Thrombosis! +------------------------------------+----------+---------------+----------+ ! Common Femoral                      !Yes       ! Yes            ! None      ! +------------------------------------+----------+---------------+----------+ ! Prox Femoral                        !Yes       ! Yes            ! None      ! +------------------------------------+----------+---------------+----------+ ! Mid Femoral                         !No        !               !          ! +------------------------------------+----------+---------------+----------+ ! Dist Femoral                        !No        !               !          ! +------------------------------------+----------+---------------+----------+ ! Deep Femoral                        !No        !               !          ! +------------------------------------+----------+---------------+----------+ ! Popliteal                           !Yes       ! Yes            ! None      ! +------------------------------------+----------+---------------+----------+ ! Sapheno Femoral Junction            ! Yes       ! Yes            ! None      ! +------------------------------------+----------+---------------+----------+ ! PTV                                 ! Partial   !Yes            ! None      ! +------------------------------------+----------+---------------+----------+ ! Peroneal                            !No        !               !          ! +------------------------------------+----------+---------------+----------+ ! Gastroc                             ! Partial   !Yes            ! None      ! +------------------------------------+----------+---------------+----------+ ! GSV Thigh                           ! Yes       ! Yes            ! None      ! +------------------------------------+----------+---------------+----------+ ! GSV Knee                            ! Yes       ! Yes            ! None      ! +------------------------------------+----------+---------------+----------+ ! GSV Ankle                           ! Yes       ! Yes            ! None      ! +------------------------------------+----------+---------------+----------+ ! SSV !Yes       !Yes            ! None      ! +------------------------------------+----------+---------------+----------+ Right Doppler Measurements +---------------------------+------+------+--------------------------------+ ! Location                   ! Signal!Reflux! Reflux (msec)                   ! +---------------------------+------+------+--------------------------------+ ! Common Femoral             !Phasic!      !                                ! +---------------------------+------+------+--------------------------------+ ! Prox Femoral               !Phasic!      !                                ! +---------------------------+------+------+--------------------------------+ ! Popliteal                  !Phasic!      !                                ! +---------------------------+------+------+--------------------------------+ Left Lower Extremities DVT Study Measurements Left 2D Measurements +------------------------------------+----------+---------------+----------+ ! Location                            ! Visualized! Compressibility! Thrombosis! +------------------------------------+----------+---------------+----------+ ! Common Femoral                      !Yes       ! Yes            ! None      ! +------------------------------------+----------+---------------+----------+ ! Prox Femoral                        !Yes       ! Yes            ! None      ! +------------------------------------+----------+---------------+----------+ ! Mid Femoral                         !No        !               !          ! +------------------------------------+----------+---------------+----------+ ! Dist Femoral                        !No        !               !          ! +------------------------------------+----------+---------------+----------+ ! Deep Femoral                        !No        !               !          ! +------------------------------------+----------+---------------+----------+ ! Popliteal !Yes       !Yes            ! None      ! +------------------------------------+----------+---------------+----------+ ! Sapheno Femoral Junction            ! Yes       ! Yes            ! None      ! +------------------------------------+----------+---------------+----------+ ! PTV                                 ! Partial   !Yes            ! None      ! +------------------------------------+----------+---------------+----------+ ! Peroneal                            !No        !               !          ! +------------------------------------+----------+---------------+----------+ ! Gastroc                             ! Partial   !Yes            ! None      ! +------------------------------------+----------+---------------+----------+ ! GSV Thigh                           ! Yes       ! Yes            ! None      ! +------------------------------------+----------+---------------+----------+ ! GSV Knee                            ! Yes       ! Yes            ! None      ! +------------------------------------+----------+---------------+----------+ ! GSV Ankle                           ! Yes       ! Yes            ! None      ! +------------------------------------+----------+---------------+----------+ ! SSV                                 ! Yes       ! Yes            ! None      ! +------------------------------------+----------+---------------+----------+ Left Doppler Measurements +---------------------------+------+------+--------------------------------+ ! Location                   ! Signal!Reflux! Reflux (msec)                   ! +---------------------------+------+------+--------------------------------+ ! Common Femoral             !Phasic! No    !                                ! +---------------------------+------+------+--------------------------------+ ! Prox Femoral               !Phasic!      !                                ! +---------------------------+------+------+--------------------------------+ ! Popliteal !Phasic!      !                                ! +---------------------------+------+------+--------------------------------+          On admission     The patient is a 71 y.o.  female, with a history of anemia, spondylolisthesis, DVT, chronic diastolic heart failure, CVA, C. difficile, HTN, CKD stage III, peptic ulcer disease, and DM type II, who presents with leg swelling. According to patient and her , legs have been increasingly edematous with progressive erythema over the past 2 weeks. Patient was admitted to this facility in March for frequent falls and found to have DVT. Patient reports that she was concerned for recurrent DVT, despite being compliant with Eliquis. Following inpatient treatment, patient received intensive therapy and acute rehab department but reports having 5 falls since that time. Patient has a history of left sided weakness from old CVA.  thinks falls are due to poor technique with transfers d/t fear of falling, plus edema makes it difficult to lift legs to walk. Symptoms are associated with painful hematoma on posterior scalp from recent fall. Patient also reports dry non-productive cough, which is residual from recent bronchitis. Denies fever, chills, chest pain, abdominal pain, nausea, vomiting, diarrhea, and urinary symptoms. There are no aggravating or alleviating factors. Symptoms are reported as constant and moderate to severe; progressively worsening. Review of Systems:     Constitutional: Negative for chills, diaphoresis and fever. HENT: Negative for congestion and sore throat. Respiratory: Positive for cough (dry, nonproductive). Negative for shortness of breath and wheezing. Cardiovascular: Positive for leg swelling (reports weeping edema). Negative for chest pain and palpitations. Gastrointestinal: Negative for abdominal pain, constipation, diarrhea, nausea and vomiting.    Genitourinary: Negative for dysuria, frequency and urgency. Musculoskeletal: Negative for back pain and myalgias. Skin: Positive for color change (erythema, BLE). Negative for rash. Neurological: Positive for weakness (generalized weakness) and headaches (pain on posterior scalp from recent fall. ). Negative for dizziness. Psychiatric/Behavioral: The patient is not nervous/anxious. Data:     Past Medical History: No change     Social History: No change    Family History: @no change    Vitals:  Reviewed    I/O (24Hr): No intake or output data in the 24 hours ending 22 1105  Labs:  CBC from today. Creatine from today. Radiology:  N/A    Medications:     Current Meds:   Scheduled Meds:    doxycycline monohydrate  100 mg Oral 2 times per day    furosemide  20 mg Oral Daily    amLODIPine  5 mg Oral Daily    apixaban  5 mg Oral BID    atorvastatin  40 mg Oral Nightly    busPIRone  5 mg Oral TID    calcium carb-cholecalciferol  1 tablet Oral Nightly    carvedilol  12.5 mg Oral BID    citalopram  20 mg Oral Daily    cyanocobalamin  1,000 mcg Oral Nightly    fenofibrate  160 mg Oral Daily    ferrous sulfate  325 mg Oral BID    gabapentin  200 mg Oral BID    pramipexole  0.5 mg Oral Nightly    sodium chloride flush  5-40 mL IntraVENous 2 times per day     Continuous Infusions:    sodium chloride 20 mL/hr at 22 2322     PRN Meds: traZODone, melatonin, sodium chloride flush, sodium chloride, potassium chloride **OR** potassium alternative oral replacement **OR** potassium chloride, magnesium sulfate, ondansetron **OR** ondansetron, polyethylene glycol, acetaminophen **OR** acetaminophen    Physical Examination:        /63   Pulse 62   Temp 98.2 °F (36.8 °C)   Resp 20   Ht 5' 3\" (1.6 m)   Wt 185 lb 3 oz (84 kg)   SpO2 95%   BMI 32.80 kg/m²   Temp (24hrs), Av.9 °F (36.6 °C), Min:97.5 °F (36.4 °C), Max:98.2 °F (36.8 °C)    No results for input(s): POCGLU in the last 72 hours.   No intake or output data in the 24 hours ending 06/09/22 1105  Constitutional:       General: She is not in acute distress. Appearance: She is well-developed. She is not diaphoretic. HENT:      Head: Normocephalic and atraumatic. Eyes:      Conjunctiva/sclera: Conjunctivae normal.      Pupils: Pupils are equal, round, and reactive to light. Neck:      Trachea: No tracheal deviation. Cardiovascular:      Rate and Rhythm: Normal rate and regular rhythm. Heart sounds: Normal heart sounds. No murmur heard. No friction rub. No gallop. Pulmonary:      Effort: Pulmonary effort is normal. No respiratory distress. Breath sounds: Normal breath sounds. No wheezing or rales. Chest:      Chest wall: No tenderness. Abdominal:      General: Bowel sounds are normal. There is no distension. Palpations: Abdomen is soft. Tenderness: There is no abdominal tenderness. There is no guarding. Musculoskeletal:         General: No tenderness. Normal range of motion. Cervical back: Normal range of motion and neck supple. Right lower leg: Edema (1+) present. Left lower leg: Edema (1+) present. Comments: Patient with 1+ pitting edema bilateral lower extremities that extends up your groin. Bilateral lower legs are mildly erythematous with increased warmth. There are multiple small scabbed areas on bilateral shins. Lymphadenopathy:      Cervical: No cervical adenopathy. Skin:     General: Skin is warm and dry. Coloration: Skin is not pale. Findings: Erythema (BLE) present. No rash. Neurological:      Mental Status: She is alert and oriented to person, place, and time. Motor: No seizure activity. Coordination: Coordination normal.   Psychiatric:         Behavior: Behavior normal.         Thought Content:  Thought content normal.     No lower extremity calf tenderness bilaterally   Redness/Induration of anterior legs bilaterally reduced     Assessment:        Primary Problem  Cellulitis of both lower Negative extremities    Principal Problem:    Cellulitis of both lower extremities  Active Problems:    Stage 3 chronic kidney disease (HCC)    Type 2 diabetes mellitus with circulatory disorder, without long-term current use of insulin (HCC)  Resolved Problems:    * No resolved hospital problems. *     Plan:          8/8/83    Pre-CERT started yesterday for SNF placement, patient will likely be discharged today or tomorrow. Trazodone as needed at night for difficulty sleeping           Jona Mireles MD  PGY-3 Internal Medicine Resident  [address] [place] [address]  6/9/2022 11:05 AM    Attestation and add on       I have discussed the care of Zackary Morillo , including pertinent history and exam findings,      6/9/22    with the resident. I have seen and examined the patient and the key elements of all parts of the encounter have been performed by me . I agree with the assessment, plan and orders as documented by the resident. Hospital Problems           Last Modified POA    * (Principal) Cellulitis of both lower extremities 6/5/2022 Yes    Stage 3 chronic kidney disease ([facility] [address] 6/6/2022 Yes    Type 2 diabetes mellitus with circulatory disorder, without long-term current use of insulin ([facility] [address] 6/6/2022 Yes             ''''''''''       MD GENA Lee[address], [address] [place] [address].    Phone [phone]   Fax: [phone]  Answering Service: [phone]

## 2023-07-03 ENCOUNTER — CARE COORDINATION (OUTPATIENT)
Dept: CARE COORDINATION | Facility: CLINIC | Age: 73
End: 2023-07-03

## 2023-07-03 NOTE — CARE COORDINATION
Remote Alert Monitoring Note  Rpm alert to be reviewed by the provider   red alert   blood pressure reading (systolic blood pressure increase above 180 mmHg)   Additional needs to be addressed by N/A: No                    Date/Time:  7/3/2023 10:01 AM  Patient Current Location: Home: [address] [address]  LPN contacted patient by telephone regarding red alert received for blood pressure reading (systolic blood pressure increase above 180 mmHg). Verified patients name and  as identifiers. Background: Pt enrolled in RPM r/t HTN and CHF  Refer to 911 immediately if:  Patient unresponsive or unable to provide history  Change in cognition or sudden confusion  Patient unable to respond in complete sentences  Intense chest pain/tightness  Any concern for any clinical emergency  Red Alert: Provider response time of 1 hr required for any red alert requiring intervention  Yellow Alert: Provider response time of 3hr required for any escalated yellow alert    BP Triage  Are you having any Chest Pain? no   Are you having any Shortness of Breath? no   Do you have a headache or have any vision changes? no   Are you having any numbness or tingling? no   Are you having any other health concerns or issues? no   Have you taken your medications as instructed by your doctor today? yes      Clinical Interventions: Reviewed and followed up on alerts and treatments-Pt is asymptomatic and denies any acute distress. Pt v/u of how to properly obtain BP and when to seek medical attention. Pt reported BP recheck of 156/74. No further action necessary at this time. Plan/Follow Up: Will continue to review, monitor and address alerts with follow up based on severity of symptoms and risk factors.

## 2023-07-05 ENCOUNTER — OFFICE VISIT (OUTPATIENT)
Dept: INTERNAL MEDICINE CLINIC | Age: 73
End: 2023-07-05
Payer: MEDICARE

## 2023-07-05 VITALS
HEART RATE: 66 BPM | HEIGHT: 63 IN | BODY MASS INDEX: 33.84 KG/M2 | OXYGEN SATURATION: 98 % | DIASTOLIC BLOOD PRESSURE: 64 MMHG | WEIGHT: 191 LBS | SYSTOLIC BLOOD PRESSURE: 102 MMHG

## 2023-07-05 DIAGNOSIS — E11.59 TYPE 2 DIABETES MELLITUS WITH OTHER CIRCULATORY COMPLICATION, WITHOUT LONG-TERM CURRENT USE OF INSULIN (HCC): ICD-10-CM
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
## 2023-07-05 DIAGNOSIS — I50.9 CONGESTIVE HEART FAILURE, UNSPECIFIED HF CHRONICITY, UNSPECIFIED HEART FAILURE TYPE (HCC): ICD-10-CM

## 2023-07-05 DIAGNOSIS — M79.602 LEFT ARM PAIN: ICD-10-CM

## 2023-07-05 DIAGNOSIS — I10 ESSENTIAL HYPERTENSION: ICD-10-CM

## 2023-07-05 PROCEDURE — 2022F DILAT RTA XM EVC RTNOPTHY: CPT | Performed by: INTERNAL MEDICINE

## 2023-07-05 PROCEDURE — 99214 OFFICE O/P EST MOD 30 MIN: CPT | Performed by: INTERNAL MEDICINE

## 2023-07-05 PROCEDURE — 3044F HG A1C LEVEL LT 7.0%: CPT | Performed by: INTERNAL MEDICINE

## 2023-07-05 PROCEDURE — G8427 DOCREV CUR MEDS BY ELIG CLIN: HCPCS | Performed by: INTERNAL MEDICINE

## 2023-07-05 PROCEDURE — 3075F SYST BP GE 130 - 139MM HG: CPT | Performed by: INTERNAL MEDICINE

## 2023-07-05 PROCEDURE — 1036F TOBACCO NON-USER: CPT | Performed by: INTERNAL MEDICINE

## 2023-07-05 PROCEDURE — G8399 PT W/DXA RESULTS DOCUMENT: HCPCS | Performed by: INTERNAL MEDICINE

## 2023-07-05 PROCEDURE — 1090F PRES/ABSN URINE INCON ASSESS: CPT | Performed by: INTERNAL MEDICINE

## 2023-07-05 PROCEDURE — 3017F COLORECTAL CA SCREEN DOC REV: CPT | Performed by: INTERNAL MEDICINE

## 2023-07-05 PROCEDURE — G8417 CALC BMI ABV UP PARAM F/U: HCPCS | Performed by: INTERNAL MEDICINE

## 2023-07-05 PROCEDURE — 3078F DIAST BP <80 MM HG: CPT | Performed by: INTERNAL MEDICINE

## 2023-07-05 PROCEDURE — 1123F ACP DISCUSS/DSCN MKR DOCD: CPT | Performed by: INTERNAL MEDICINE

## 2023-07-05 NOTE — PROGRESS NOTES
Subjective:      Patient ID: Emmy Sandoval is a 68 y.o. female. HPI  HPI- patient past medical history multiple medical problem which include obesity, history of stroke with Left sided weekness , heart failure with preserved ejection fraction, chronic DVT on anticoagulation, hypertension  Diana Albert ,has Home health coming at her home   On [facility]   Never had CAD , NO H/o stents in her Heart , CKD, DM   ECHO - 4/23  Normal left ventricle size, wall thickness and function with an estimated EF  > 55%. Right atrial dilatation. No significant valvular regurgitation or stenosis seen    Cleared for Sx by Dr Kandace Alves , Cardiologist on 6/9   Had  a Mechanical Fall , hit her head , hit her Left arm, Having pain in left arm , no headache   Was taking Lasix bid rather TID   Has Notice swelling in her Legs   Patient is scheduled for anterior cervical discectomy, with chronic neck pain by neurosurgery  Requesting preop clearance    Review of Systems   Constitutional:  Positive for unexpected weight change (weight gain). Negative for activity change, appetite change, chills, diaphoresis, fatigue and fever. Wheel chair Bound    HENT:  Negative for congestion, dental problem, drooling and ear discharge. Eyes:  Negative for pain, discharge, redness and itching. Respiratory:  Positive for cough and shortness of breath. Negative for apnea, choking and chest tightness. Cardiovascular:  Positive for leg swelling (both legs). Negative for chest pain. Gastrointestinal:  Negative for abdominal distention, abdominal pain, blood in stool, constipation and diarrhea. Endocrine: Negative for cold intolerance and heat intolerance. Genitourinary:  Negative for difficulty urinating, dysuria, enuresis, flank pain and frequency. Musculoskeletal:  Positive for arthralgias (left arm), neck pain and neck stiffness. Negative for back pain, gait problem and joint swelling. Skin:  Negative for color change, pallor and rash.

## 2023-07-06 DIAGNOSIS — R22.43 LOCALIZED SWELLING OF BOTH LOWER LEGS: ICD-10-CM

## 2023-07-06 DIAGNOSIS — I50.32 CHRONIC DIASTOLIC HEART FAILURE (HCC): ICD-10-CM

## 2023-07-06 RX ORDER — GABAPENTIN 100 MG/1
100 CAPSULE ORAL 2 TIMES DAILY
Qty: 30 CAPSULE | Refills: 0 | Status: SHIPPED | OUTPATIENT
Start: 2023-07-06 | End: 2023-08-05

## 2023-07-06 RX ORDER — FUROSEMIDE 40 MG/1
40 TABLET ORAL 3 TIMES DAILY
Qty: 90 TABLET | Refills: 3 | Status: SHIPPED | OUTPATIENT
Start: 2023-07-06

## 2023-07-06 ASSESSMENT — ENCOUNTER SYMPTOMS
BLOOD IN STOOL: 0
CHEST TIGHTNESS: 0
CHOKING: 0
BACK PAIN: 0
COLOR CHANGE: 0
EYE REDNESS: 0
EYE PAIN: 0
COUGH: 1
ABDOMINAL PAIN: 0
EYE DISCHARGE: 0
ABDOMINAL DISTENTION: 0
SHORTNESS OF BREATH: 1
DIARRHEA: 0
CONSTIPATION: 0
APNEA: 0
EYE ITCHING: 0

## 2023-07-07 RX ORDER — SODIUM CHLORIDE, SODIUM LACTATE, POTASSIUM CHLORIDE, CALCIUM CHLORIDE 600; 310; 30; 20 MG/100ML; MG/100ML; MG/100ML; MG/100ML
INJECTION, SOLUTION INTRAVENOUS CONTINUOUS
OUTPATIENT
Start: 2023-07-07

## 2023-07-10 ENCOUNTER — CARE COORDINATION (OUTPATIENT)
Dept: CARE COORDINATION | Age: 73
End: 2023-07-10

## 2023-07-10 NOTE — CARE COORDINATION
Remote Alert Monitoring Note      Date/Time:  7/10/2023 9:26 AM  Patient Current Location: Home: [address] [address]    LPN contacted patient by telephone regarding red alert received for weight increase (196.6). Verified patients name and  as identifiers. Rpm alert to be reviewed by the provider         Additional needs to be addressed by : none                     Background: High Blood-Pressure, CHF    Refer to 911 immediately if:  Patient unresponsive or unable to provide history  Change in cognition or sudden confusion  Patient unable to respond in complete sentences  Intense chest pain/tightness  Any concern for any clinical emergency  Red Alert: Provider response time of 1 hr required for any red alert requiring intervention  Yellow Alert: Provider response time of 3hr required for any escalated yellow alert    Weight Scale Triage  Was your weight obtained upon rising/waking today? yes   Was your weight obtained after voiding and/or use of the bathroom today? yes   Did you weigh yourself in the same amount of clothing today, compared to how you typically do? yes   Was the scale bumped or moved prior to today's weight? no   Is your scale on a flat/hard surface? yes   Did you obtain your weight with shoes on? no   If yes, is this something you normally do during your daily weights? yes   Were you standing up straight on the scale today? yes   Were you leaning on anything while obtaining your weight today? no     Have you taken your medications as instructed by your doctor today? Yes   Weight Triage  Are you weighing any different than you did yesterday? (time of day, clothes and shoes on or off, etc)? no   Do you have any shortness of breath?  no   Do you have any swelling in your hands of feet? unchanged   Are you having any other health concerns or issues? no       Clinical Interventions: Reviewed and followed up on alerts and treatments-. Pt is in no apparent distress, speaking in

## 2023-07-11 ENCOUNTER — CARE COORDINATION (OUTPATIENT)
Dept: CARE COORDINATION | Age: 73
End: 2023-07-11

## 2023-07-11 NOTE — CARE COORDINATION
Remote Patient Monitoring Note      Date/Time:  7/11/2023 8:52 AM    LPN noted red alert for weight increase (195.6)      Background: High Blood-Pressure, CHF    Clinical Interventions: Reviewed and followed up on alerts and treatments-. Patients weight is trending down from yesterday and she is currently at baseline. Spoke with patient 7/10 and she was asymptomatic. She stated there was a night she went without supper and attributes the low weight of 188 on 7/6 to this. No outreach necessary at this time. Will continue to monitor. Plan/Follow Up: Will continue to review, monitor and address alerts with follow up based on severity of symptoms and risk factors.        Nemo Sanders LPN  [facility]/ CTN/ Remote Patient Monitoring  [phone]

## 2023-07-12 ENCOUNTER — HOSPITAL ENCOUNTER (OUTPATIENT)
Dept: PREADMISSION TESTING | Age: 73
Discharge: HOME | End: 2023-07-12
Payer: MEDICARE

## 2023-07-12 ENCOUNTER — HOSPITAL ENCOUNTER (OUTPATIENT)
Age: 73
Discharge: HOME | End: 2023-07-14
Payer: MEDICARE

## 2023-07-12 ENCOUNTER — HOSPITAL ENCOUNTER (OUTPATIENT)
Dept: GENERAL RADIOLOGY | Age: 73
Discharge: HOME | End: 2023-07-14
Payer: MEDICARE

## 2023-07-12 ENCOUNTER — CARE COORDINATION (OUTPATIENT)
Dept: CARE COORDINATION | Age: 73
End: 2023-07-12

## 2023-07-12 VITALS
HEIGHT: 63 IN | SYSTOLIC BLOOD PRESSURE: 158 MMHG | RESPIRATION RATE: 18 BRPM | BODY MASS INDEX: 33.84 KG/M2 | HEART RATE: 78 BPM | WEIGHT: 191 LBS | OXYGEN SATURATION: 98 % | TEMPERATURE: 97.2 F | DIASTOLIC BLOOD PRESSURE: 83 MMHG

## 2023-07-12 DIAGNOSIS — M79.602 LEFT ARM PAIN: ICD-10-CM

## 2023-07-12 LAB
ABO + RH BLD: NORMAL
ANION GAP SERPL CALCULATED.3IONS-SCNC: 14 MMOL/L (ref 9–17)
ARM BAND NUMBER: NORMAL
BLOOD GROUP ANTIBODIES SERPL: NEGATIVE
BUN SERPL-MCNC: 35 MG/DL (ref 8–23)
CHLORIDE SERPL-SCNC: 105 MMOL/L (ref 98–107)
CO2 SERPL-SCNC: 23 MMOL/L (ref 20–31)
CREAT SERPL-MCNC: 1.2 MG/DL (ref 0.5–0.9)
ERYTHROCYTE [DISTWIDTH] IN BLOOD BY AUTOMATED COUNT: 13.1 % (ref 11.8–14.4)
EXPIRATION DATE: NORMAL
GFR SERPL CREATININE-BSD FRML MDRD: 48 ML/MIN/1.73M2
GLUCOSE SERPL-MCNC: 113 MG/DL (ref 70–99)
HCT VFR BLD AUTO: 39.7 % (ref 36.3–47.1)
HGB BLD-MCNC: 12.9 G/DL (ref 11.9–15.1)
INR PPP: 1.2
MCH RBC QN AUTO: 31.6 PG (ref 25.2–33.5)
MCHC RBC AUTO-ENTMCNC: 32.5 G/DL (ref 28.4–34.8)
MCV RBC AUTO: 97.3 FL (ref 82.6–102.9)
NRBC BLD-RTO: 0 PER 100 WBC
PARTIAL THROMBOPLASTIN TIME: 34.7 SEC (ref 23–36.5)
PLATELET # BLD AUTO: 304 K/UL (ref 138–453)
PMV BLD AUTO: 9.3 FL (ref 8.1–13.5)
POTASSIUM SERPL-SCNC: 3.8 MMOL/L (ref 3.7–5.3)
PROTHROMBIN TIME: 15.3 SEC (ref 11.7–14.9)
RBC # BLD AUTO: 4.08 M/UL (ref 3.95–5.11)
SODIUM SERPL-SCNC: 142 MMOL/L (ref 135–144)
WBC OTHER # BLD: 8.4 K/UL (ref 3.5–11.3)

## 2023-07-12 PROCEDURE — 82947 ASSAY GLUCOSE BLOOD QUANT: CPT

## 2023-07-12 PROCEDURE — 86901 BLOOD TYPING SEROLOGIC RH(D): CPT

## 2023-07-12 PROCEDURE — 82565 ASSAY OF CREATININE: CPT

## 2023-07-12 PROCEDURE — 73060 X-RAY EXAM OF HUMERUS: CPT

## 2023-07-12 PROCEDURE — 85027 COMPLETE CBC AUTOMATED: CPT

## 2023-07-12 PROCEDURE — 80051 ELECTROLYTE PANEL: CPT

## 2023-07-12 PROCEDURE — 86900 BLOOD TYPING SEROLOGIC ABO: CPT

## 2023-07-12 PROCEDURE — 85610 PROTHROMBIN TIME: CPT

## 2023-07-12 PROCEDURE — 86850 RBC ANTIBODY SCREEN: CPT

## 2023-07-12 PROCEDURE — 84520 ASSAY OF UREA NITROGEN: CPT

## 2023-07-12 PROCEDURE — 36415 COLL VENOUS BLD VENIPUNCTURE: CPT

## 2023-07-12 PROCEDURE — 85730 THROMBOPLASTIN TIME PARTIAL: CPT

## 2023-07-12 ASSESSMENT — PAIN DESCRIPTION - DESCRIPTORS: DESCRIPTORS: ACHING

## 2023-07-12 ASSESSMENT — PAIN DESCRIPTION - FREQUENCY: FREQUENCY: CONTINUOUS

## 2023-07-12 ASSESSMENT — PAIN DESCRIPTION - PAIN TYPE: TYPE: CHRONIC PAIN

## 2023-07-12 ASSESSMENT — PAIN DESCRIPTION - ONSET: ONSET: ON-GOING

## 2023-07-12 ASSESSMENT — PAIN SCALES - GENERAL: PAINLEVEL_OUTOF10: 5

## 2023-07-12 ASSESSMENT — PAIN DESCRIPTION - LOCATION: LOCATION: ARM;NECK

## 2023-07-12 NOTE — CARE COORDINATION
Remote Patient Monitoring Note      Date/Time:  7/12/2023 8:38 AM    LPN noted red alert for weight increase (194.4)        Background: High Blood-Pressure, CHF     Clinical Interventions: Reviewed and followed up on alerts and treatments-. Patients weight continues to trend adolfo and she is currently at baseline. Spoke with patient 7/10 and she was asymptomatic. She stated there was a night she went without supper and attributes the low weight of 188 on 7/6 to this. No outreach necessary at this time. Will continue to monitor. Plan/Follow Up: Will continue to review, monitor and address alerts with follow up based on severity of symptoms and risk factors.        Elder Bosworth, LPN  [facility]/ CTN/ Remote Patient Monitoring  [phone]

## 2023-07-12 NOTE — PROGRESS NOTES
Anesthesia Focused Assessment    STOP-BANG Sleep Apnea Questionnaire    SNORE loudly (heard through closed doors)? Yes  TIRED, fatigued, sleepy during daytime? Yes  OBSERVED stopping breathing during sleep? No  High blood PRESSURE being treated? Yes    BMI over 35? No  AGE over 48? Yes  NECK circumference over 16\"? No  GENDER (male)? No             Total 4  High risk 5-8  Intermediate risk 3-4  Low risk 0-2    Obstructive Sleep Apnea: snores  If YES, machine used: no cpap     Type 1 DM:   no  T2DM:  prediabetes    Coronary Artery Disease:  follows with cardiology, no stents, history of cath  Hypertension:  yes    Active smoker:  1/2 ppd for 20 years, quit 2017  Drinks Alcohol:  no    Dentition: upper front and lower front caps present x 2    Defib / AICD / Pacemaker: no      Renal Failure/dialysis:  no    Patient was evaluated in PAT & anesthesia guidelines were applied. NPO guidelines, medication instructions and scheduled arrival time were reviewed with patient. I advised patient to please contact the surgeon's office, ahead of time if possible, if any new signs or symptoms of illness, infection, rash, etc    Hx of anesthesia complications:  PONV-requests scop patch. Family hx of anesthesia complications:  no                                                                                                                     Anesthesia contacted:     Medical or cardiac clearance ordered: cardiac clearance is in the paper chart, along with office notes. PCP clearance is in the paper chart, along with office notes, Dr. Monet Spencer.     Nora Shea PA-C  7/12/23  10:46 AM

## 2023-07-12 NOTE — H&P (VIEW-ONLY)
History and Physical    Pt Name: Vince West  MRN: [id number]  YOB: 1950  Date of evaluation: 7/12/2023    SUBJECTIVE:   History of Chief Complaint:    Patient presents for PAT appointment. She reports neck pain, weakness, inability to hold up her neck easily. She has kyphosis, LUE symptoms as well. She has had lumbar spine surgery in the past x 2, as well as cervical spine surgery, 2021. She has been scheduled for ANTERIOR CERVICAL DISCECTOMY, FUSION  C6-7; POSTERIOR CERVICAL LAMINECTOMY C1-2, CERVICAL FUSION C2-T2. Past Medical History    has a past medical history of Allergic rhinitis, cause unspecified, Back pain, Bowel obstruction (HCC), C. difficile diarrhea, CAD (coronary artery disease), Cardiac murmur, Cellulitis, Cerebral artery occlusion with cerebral infarction ([address]), COVID-19, Diverticulosis of colon (without mention of hemorrhage), GERD (gastroesophageal reflux disease), History of blood transfusion, History of CHF (congestive heart failure), History of MI (myocardial infarction), History of ovarian cyst, History of peritonitis, HTN (hypertension), Hx of blood clots, Hyperlipidemia, Intestinal or peritoneal adhesions with obstruction (postoperative) (postinfection) ([address]), Kidney infection, Lateral epicondylitis  of elbow, MDRO (multiple drug resistant organisms) resistance, Muscle strain, Other abnormal glucose, PONV (postoperative nausea and vomiting), Pre-diabetes, Restless legs syndrome (RLS), Snores, 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[address]), TIA (transient ischemic attack), Under care of service provider, Under care of service provider, Uses walker, Vitamin D deficiency, Wears glasses, and Wellness examination. Past Surgical History   has a past surgical history that includes Ovary removal (1970); colectomy (1969); Appendectomy (1968); Ovary removal (1971); Hysterectomy (1973); Bunionectomy (Left); sinus surgery (2004);  Colonoscopy; other surgical history (08/14/2014); Topical Sulfur Applications Counseling: Topical Sulfur Counseling: Patient counseled that this medication may cause skin irritation or allergic reactions.  In the event of skin irritation, the patient was advised to reduce the amount of the drug applied or use it less frequently.   The patient verbalized understanding of the proper use and possible adverse effects of topical sulfur application.  All of the patient's questions and concerns were addressed.

## 2023-07-12 NOTE — H&P
History and Physical    Pt Name: John Ashraf  MRN: [id number]  YOB: 1950  Date of evaluation: 7/12/2023    SUBJECTIVE:   History of Chief Complaint:    Patient presents for PAT appointment. She reports neck pain, weakness, inability to hold up her neck easily. She has kyphosis, LUE symptoms as well. She has had lumbar spine surgery in the past x 2, as well as cervical spine surgery, 2021. She has been scheduled for ANTERIOR CERVICAL DISCECTOMY, FUSION  C6-7; POSTERIOR CERVICAL LAMINECTOMY C1-2, CERVICAL FUSION C2-T2. Past Medical History    has a past medical history of Allergic rhinitis, cause unspecified, Back pain, Bowel obstruction (HCC), C. difficile diarrhea, CAD (coronary artery disease), Cardiac murmur, Cellulitis, Cerebral artery occlusion with cerebral infarction ([address]), COVID-19, Diverticulosis of colon (without mention of hemorrhage), GERD (gastroesophageal reflux disease), History of blood transfusion, History of CHF (congestive heart failure), History of MI (myocardial infarction), History of ovarian cyst, History of peritonitis, HTN (hypertension), Hx of blood clots, Hyperlipidemia, Intestinal or peritoneal adhesions with obstruction (postoperative) (postinfection) ([address]), Kidney infection, Lateral epicondylitis  of elbow, MDRO (multiple drug resistant organisms) resistance, Muscle strain, Other abnormal glucose, PONV (postoperative nausea and vomiting), Pre-diabetes, Restless legs syndrome (RLS), Snores, 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[address]), TIA (transient ischemic attack), Under care of service provider, Under care of service provider, Uses walker, Vitamin D deficiency, Wears glasses, and Wellness examination. Past Surgical History   has a past surgical history that includes Ovary removal (1970); colectomy (1969); Appendectomy (1968); Ovary removal (1971); Hysterectomy (1973); Bunionectomy (Left); sinus surgery (2004);  Colonoscopy; other surgical history (08/14/2014);

## 2023-07-13 ENCOUNTER — CARE COORDINATION (OUTPATIENT)
Dept: CARE COORDINATION | Age: 73
End: 2023-07-13

## 2023-07-13 ENCOUNTER — HOSPITAL ENCOUNTER (OUTPATIENT)
Dept: CT IMAGING | Age: 73
Discharge: HOME | End: 2023-07-15
Attending: INTERNAL MEDICINE
Payer: MEDICARE
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
## 2023-07-13 PROCEDURE — 70450 CT HEAD/BRAIN W/O DYE: CPT

## 2023-07-13 NOTE — CARE COORDINATION
Remote Patient Monitoring Note      Date/Time:  7/13/2023 8:19 AM    LPN noted red alert for weight increase (194.8)        Background: High Blood-Pressure, CHF     Clinical Interventions: Reviewed and followed up on alerts and treatments-. Patients weight is currently at baseline. Spoke with patient 7/10 and she was asymptomatic. She stated there was a night she went without supper and attributes the low weight of 188 on 7/6 to this. No outreach necessary at this time. Will continue to monitor. Plan/Follow Up: Will continue to review, monitor and address alerts with follow up based on severity of symptoms and risk factors.        Braxton Whitaker, CUCO  [facility]/ CTN/ Remote Patient Monitoring  [phone]

## 2023-07-14 ENCOUNTER — CARE COORDINATION (OUTPATIENT)
Dept: CARE COORDINATION | Age: 73
End: 2023-07-14

## 2023-07-14 NOTE — CARE COORDINATION
Remote Patient Monitoring Note      Date/Time:  7/14/2023 8:50 AM    LPN noted red alert for weight increase (197)        Background: High Blood-Pressure, CHF     Clinical Interventions: Reviewed and followed up on alerts and treatments-. Patients weight is currently at baseline. Spoke with patient 7/10 and she was asymptomatic. She stated there was a night she went without supper and attributes the low weight of 188 on 7/6 to this. No outreach necessary at this time. Will continue to monitor. Plan/Follow Up: Will continue to review, monitor and address alerts with follow up based on severity of symptoms and risk factors.        Tran Sam LPN  [facility]/ CTN/ Remote Patient Monitoring  [phone]

## 2023-07-17 ENCOUNTER — TELEPHONE (OUTPATIENT)
Dept: INTERNAL MEDICINE CLINIC | Age: 73
End: 2023-07-17

## 2023-07-17 NOTE — TELEPHONE ENCOUNTER
Patient called for results of recent testing. Please review all labs and/or testing ordered. PCP is on [place], please review and advise.

## 2023-07-17 NOTE — TELEPHONE ENCOUNTER
----- Message from [facility] sent at 7/14/2023  4:09 PM EDT -----  Subject: Results Request    QUESTIONS  Results: ct scan head - (L) Humerus xray done on 7/12; Ordered by: Walt Nageotte   Date Performed: 2023-07-14  ---------------------------------------------------------------------------  --------------  Fernanda FLAHERTY    [phone]; OK to leave message on voicemail,Do not leave any message,   patient will call back for answer  ---------------------------------------------------------------------------  --------------

## 2023-07-18 ENCOUNTER — TELEPHONE (OUTPATIENT)
Dept: INTERNAL MEDICINE CLINIC | Age: 73
End: 2023-07-18

## 2023-07-18 DIAGNOSIS — F32.A DEPRESSION, UNSPECIFIED DEPRESSION TYPE: ICD-10-CM

## 2023-07-18 RX ORDER — PYRIDOXINE HCL (VITAMIN B6) 100 MG
TABLET ORAL
Qty: 30 TABLET | Refills: 2 | Status: SHIPPED | OUTPATIENT
Start: 2023-07-18

## 2023-07-18 RX ORDER — CITALOPRAM 20 MG/1
20 TABLET ORAL DAILY
Qty: 30 TABLET | Refills: 0 | Status: SHIPPED | OUTPATIENT
Start: 2023-07-18

## 2023-07-18 NOTE — TELEPHONE ENCOUNTER
Calcium Carbonate-Vitamin D 500-3. 125 MG-MCG TABS [[id number]]     Order Details  Dose, Route, Frequency: As Directed   Dispense Quantity: 30 tablet       Pharmacist contacting office to inform that this dose 500-3. 12mcg is not available , but in stock dose Calcium would be 600 / 10mcg   Please advise on change

## 2023-07-18 NOTE — TELEPHONE ENCOUNTER
----- Message from Duncan Paul sent at 7/18/2023 10:23 AM EDT -----  Subject: Results Request    QUESTIONS  Results: alona; Ordered by: Kiet Joseph   Date Performed: 2023-07-12  ---------------------------------------------------------------------------  --------------  Kacie RIBEIRO    [phone]; OK to leave message on voicemail  ---------------------------------------------------------------------------  --------------

## 2023-07-18 NOTE — TELEPHONE ENCOUNTER
Please advise PCP out of office. Patient would like the results on the xray and CT scan she had done recently.

## 2023-07-19 ENCOUNTER — CARE COORDINATION (OUTPATIENT)
Dept: CARE COORDINATION | Age: 73
End: 2023-07-19

## 2023-07-19 NOTE — CARE COORDINATION
Ambulatory Care Coordination Note  2023    Patient Current Location:  Home: [address] [address]     ACM contacted the patient by telephone. Verified name and  with patient as identifiers. Provided introduction to self, and explanation of the ACM role. Challenges to be reviewed by the provider   Additional needs identified to be addressed with provider: No  none               Method of communication with provider: none. Date Care Coordination Episode Started:  23      Reason patient is in Care Coordination:     CTN referral    Topics Discussed Today:     Medications, no needs per patient. Fall, patient reports that she tripped over her own feet in her kitchen. She verified that she does not have any rugs. She fell and hit the top of her head on her cupboard. Denied LOC or any wounds. She declined the need for an appointment, stated she does not have any pain there, no N/V or confusion. Declined a message to PCP. Reviewed S&S that would need to be addressed if they occur, patient understood. CHF, no swelling, no SOB, weight stable, no concerns. RPM is going well, last reading:  Green Alert -Blood Pressure: 136/74, 70bpm Pulseox: 97%, 72bpm Survey: - Weight: 196.6lbs Note Created at: 2023 08:10 AM   Patient denied any needs from PayLease or PCP at this time. Assessments completed today:     Fall risk  Initial assessment  Medication reconciliation  SDOH  CHF (Health assessments)      Care Coordinator plan of care: Follow up cc call in 1-2 weeks, continue education and support. Offered patient enrollment in the Remote Patient Monitoring (RPM) program for in-home monitoring: Yes, patient already enrolled. Care Coordination Interventions    Referral from Primary Care Provider: No  Suggested Interventions and Community Resources  Fall Risk Prevention:  In Process  Home Health Services: Completed (Comment: alexandra)  Physical Therapy: Completed  Transportation

## 2023-07-21 ENCOUNTER — TELEPHONE (OUTPATIENT)
Dept: INTERNAL MEDICINE CLINIC | Age: 73
End: 2023-07-21

## 2023-07-21 DIAGNOSIS — M79.672 LEFT FOOT PAIN: Primary | ICD-10-CM

## 2023-07-21 NOTE — TELEPHONE ENCOUNTER
Ether Splinter from home care called to report that the patient fell. Patient is reporting some pain in the area of the mid foot  lower ankle(left side). No other reported injuries. Dr. Phuong Silverman out of office please advise.

## 2023-07-24 ENCOUNTER — CARE COORDINATION (OUTPATIENT)
Dept: CARE COORDINATION | Age: 73
End: 2023-07-24

## 2023-07-24 NOTE — CARE COORDINATION
Remote Alert Monitoring Note      Date/Time:  2023 9:32 AM  Patient Current Location: Home: [address] [address]    LPN contacted patient by telephone regarding red alert received for weight increase (199.8). Verified patients name and  as identifiers. Rpm alert to be reviewed by the provider                             Background: High Blood-Pressure, CHF    Refer to 911 immediately if:  Patient unresponsive or unable to provide history  Change in cognition or sudden confusion  Patient unable to respond in complete sentences  Intense chest pain/tightness  Any concern for any clinical emergency  Red Alert: Provider response time of 1 hr required for any red alert requiring intervention  Yellow Alert: Provider response time of 3hr required for any escalated yellow alert    Weight Scale Triage  Was your weight obtained upon rising/waking today? yes   Was your weight obtained after voiding and/or use of the bathroom today? yes   Did you weigh yourself in the same amount of clothing today, compared to how you typically do? yes   Was the scale bumped or moved prior to today's weight? no   Is your scale on a flat/hard surface? yes   Did you obtain your weight with shoes on? no   If yes, is this something you normally do during your daily weights? yes   Were you standing up straight on the scale today? yes   Were you leaning on anything while obtaining your weight today? no       Weight Triage  Are you weighing any different than you did yesterday? (time of day, clothes and shoes on or off, etc)? no   Do you have any shortness of breath? no   Do you have any swelling in your hands of feet? no   Are you having any other health concerns or issues? no       Clinical Interventions: Reviewed and followed up on alerts and treatments-. Pt is asymptomatic and in no apparent distress, speaking in full sentences. Patient states she isnt sure why her weight is up today. She denies  any SOB or swelling.

## 2023-07-25 ENCOUNTER — CARE COORDINATION (OUTPATIENT)
Dept: CARE COORDINATION | Age: 73
End: 2023-07-25

## 2023-07-25 ENCOUNTER — ANESTHESIA EVENT (OUTPATIENT)
Dept: OPERATING ROOM | Age: 73
End: 2023-07-25
Payer: MEDICARE

## 2023-07-25 NOTE — CARE COORDINATION
Remote Patient Monitoring Note      Date/Time:  7/25/2023 9:04 AM      LPN attempted to contact patient by telephone regarding red alert received for weight increase (200.2). Background:  High Blood-Pressure, CHF    Clinical Interventions:  HIPAA compliant message left x2 on VM requesting a return call. Plan/Follow Up: Will continue to review, monitor and address alerts with follow up based on severity of symptoms and risk factors. ACM updated.       Nemo Sanders LPN  [facility]/ CTN/ Remote Patient Monitoring  [phone]

## 2023-07-26 ENCOUNTER — APPOINTMENT (OUTPATIENT)
Dept: GENERAL RADIOLOGY | Age: 73
End: 2023-07-26
Attending: NEUROLOGICAL SURGERY
Payer: MEDICARE

## 2023-07-26 ENCOUNTER — ANESTHESIA (OUTPATIENT)
Dept: OPERATING ROOM | Age: 73
End: 2023-07-26
Payer: MEDICARE

## 2023-07-26 ENCOUNTER — HOSPITAL ENCOUNTER (INPATIENT)
Age: 73
LOS: 9 days | Discharge: NURSING, REHAB OR LONG-TERM CARE | End: 2023-08-04
Attending: NEUROLOGICAL SURGERY | Admitting: NEUROLOGICAL SURGERY
Payer: MEDICARE

## 2023-07-26 VITALS
HEART RATE: 66 BPM | WEIGHT: 200.2 LBS | BODY MASS INDEX: 35.46 KG/M2 | DIASTOLIC BLOOD PRESSURE: 73 MMHG | OXYGEN SATURATION: 97 % | SYSTOLIC BLOOD PRESSURE: 169 MMHG

## 2023-07-26 DIAGNOSIS — M47.812 CERVICAL SPONDYLOSIS: ICD-10-CM

## 2023-07-26 DIAGNOSIS — M40.204 KYPHOSIS OF THORACIC REGION, UNSPECIFIED KYPHOSIS TYPE: ICD-10-CM

## 2023-07-26 PROBLEM — Z98.890 STATUS POST SURGERY: Status: ACTIVE | Noted: 2023-07-26

## 2023-07-26 LAB
ARTERIAL PATENCY WRIST A: ABNORMAL
BODY TEMPERATURE: 35.5
BODY TEMPERATURE: 37
BODY TEMPERATURE: 37
BUN BLD-MCNC: 21 MG/DL (ref 8–26)
CA-I BLD-SCNC: 1.15 MMOL/L (ref 1.13–1.33)
CA-I BLD-SCNC: 1.17 MMOL/L (ref 1.13–1.33)
CA-I BLD-SCNC: 1.2 MMOL/L (ref 1.13–1.33)
CHLORIDE, WHOLE BLOOD: 115 MMOL/L (ref 98–110)
CHLORIDE, WHOLE BLOOD: 116 MMOL/L (ref 98–110)
CHLORIDE, WHOLE BLOOD: 119 MMOL/L (ref 98–110)
COHGB MFR BLD: 1.6 % (ref 0–5)
COHGB MFR BLD: 1.7 % (ref 0–5)
COHGB MFR BLD: 1.8 % (ref 0–5)
EGFR, POC: >60 ML/MIN/1.73M2
FIO2 ON VENT: ABNORMAL %
GLUCOSE BLD-MCNC: 112 MG/DL (ref 74–100)
GLUCOSE BLD-MCNC: 117 MG/DL (ref 65–105)
GLUCOSE BLD-MCNC: 142 MG/DL (ref 65–105)
GLUCOSE BLD-MCNC: 166 MG/DL (ref 65–105)
HCO3 ARTERIAL: 19 MMOL/L (ref 22–27)
HCO3 ARTERIAL: 20 MMOL/L (ref 22–27)
HCO3 ARTERIAL: 20.9 MMOL/L (ref 22–27)
HCT VFR BLD CALC: 33.2 % (ref 36.3–47.1)
HCT VFR BLD CALC: 34.7 % (ref 36.3–47.1)
HCT VFR BLD CALC: 35.5 % (ref 36.3–47.1)
HCT VFR BLD CALC: ABNORMAL %
HEMOGLOBIN: 10.7 GM/DL (ref 11.9–15.1)
HEMOGLOBIN: 11.2 GM/DL (ref 11.9–15.1)
HEMOGLOBIN: 11.5 GM/DL (ref 11.9–15.1)
HEMOGLOBIN: ABNORMAL GM/DL
LACTIC ACID, WHOLE BLOOD: 1.8 MMOL/L (ref 0.7–2.1)
NEGATIVE BASE EXCESS, ART: 3.5 MMOL/L (ref 0–2)
NEGATIVE BASE EXCESS, ART: 4.3 MMOL/L (ref 0–2)
NEGATIVE BASE EXCESS, ART: 6.2 MMOL/L (ref 0–2)
O2 SAT, ARTERIAL: 98.6 % (ref 94–100)
O2 SAT, ARTERIAL: 99.2 % (ref 94–100)
O2 SAT, ARTERIAL: 99.4 % (ref 94–100)
PCO2 ARTERIAL: 36.2 MMHG (ref 32–45)
PCO2 ARTERIAL: 37.6 MMHG (ref 32–45)
PCO2 ARTERIAL: 38.7 MMHG (ref 32–45)
PCO2, ART, TEMP ADJ: 36 (ref 32–45)
PH ARTERIAL: 7.31 (ref 7.35–7.45)
PH ARTERIAL: 7.36 (ref 7.35–7.45)
PH ARTERIAL: 7.36 (ref 7.35–7.45)
PH, ART, TEMP ADJ: 7.33 (ref 7.35–7.45)
PO2 ARTERIAL: 163 MMHG (ref 75–95)
PO2 ARTERIAL: 197 MMHG (ref 75–95)
PO2 ARTERIAL: 223 MMHG (ref 75–95)
PO2, ART, TEMP ADJ: 217 MMHG (ref 75–95)
POC CREATININE: 0.7 MG/DL (ref 0.51–1.19)
POTASSIUM BLD-SCNC: 4.2 MMOL/L (ref 3.5–4.5)
POTASSIUM, WHOLE BLOOD: 3.7 MMOL/L (ref 3.6–5)
POTASSIUM, WHOLE BLOOD: 3.8 MMOL/L (ref 3.6–5)
POTASSIUM, WHOLE BLOOD: 3.9 MMOL/L (ref 3.6–5)
SODIUM, WHOLE BLOOD: 141 MMOL/L (ref 136–145)
SODIUM, WHOLE BLOOD: 141 MMOL/L (ref 136–145)
SODIUM, WHOLE BLOOD: 144 MMOL/L (ref 136–145)

## 2023-07-26 PROCEDURE — 22600 ARTHRD PST TQ 1NTRSPC CRV: CPT | Performed by: NEUROLOGICAL SURGERY

## 2023-07-26 PROCEDURE — 2720000010 HC SURG SUPPLY STERILE: Performed by: NEUROLOGICAL SURGERY

## 2023-07-26 PROCEDURE — 85018 HEMOGLOBIN: CPT

## 2023-07-26 PROCEDURE — 6370000000 HC RX 637 (ALT 250 FOR IP): Performed by: STUDENT IN AN ORGANIZED HEALTH CARE EDUCATION/TRAINING PROGRAM

## 2023-07-26 PROCEDURE — 2580000003 HC RX 258

## 2023-07-26 PROCEDURE — 22214 INCIS 1 VERTEBRAL SEG LUMBAR: CPT | Performed by: NEUROLOGICAL SURGERY

## 2023-07-26 PROCEDURE — 6360000002 HC RX W HCPCS

## 2023-07-26 PROCEDURE — 2580000003 HC RX 258: Performed by: NEUROLOGICAL SURGERY

## 2023-07-26 PROCEDURE — 0RG10A0 FUSION OF CERVICAL VERTEBRAL JOINT WITH INTERBODY FUSION DEVICE, ANTERIOR APPROACH, ANTERIOR COLUMN, OPEN APPROACH: ICD-10-PCS | Performed by: NEUROLOGICAL SURGERY

## 2023-07-26 PROCEDURE — 2709999900 HC NON-CHARGEABLE SUPPLY: Performed by: NEUROLOGICAL SURGERY

## 2023-07-26 PROCEDURE — 84132 ASSAY OF SERUM POTASSIUM: CPT

## 2023-07-26 PROCEDURE — 3600000015 HC SURGERY LEVEL 5 ADDTL 15MIN: Performed by: NEUROLOGICAL SURGERY

## 2023-07-26 PROCEDURE — C1713 ANCHOR/SCREW BN/BN,TIS/BN: HCPCS | Performed by: NEUROLOGICAL SURGERY

## 2023-07-26 PROCEDURE — 22614 ARTHRD PST TQ 1NTRSPC EA ADD: CPT | Performed by: NEUROLOGICAL SURGERY

## 2023-07-26 PROCEDURE — 82805 BLOOD GASES W/O2 SATURATION: CPT

## 2023-07-26 PROCEDURE — 36620 INSERTION CATHETER ARTERY: CPT

## 2023-07-26 PROCEDURE — 82565 ASSAY OF CREATININE: CPT

## 2023-07-26 PROCEDURE — 83605 ASSAY OF LACTIC ACID: CPT

## 2023-07-26 PROCEDURE — 87086 URINE CULTURE/COLONY COUNT: CPT

## 2023-07-26 PROCEDURE — 2500000003 HC RX 250 WO HCPCS

## 2023-07-26 PROCEDURE — 3700000001 HC ADD 15 MINUTES (ANESTHESIA): Performed by: NEUROLOGICAL SURGERY

## 2023-07-26 PROCEDURE — 2500000003 HC RX 250 WO HCPCS: Performed by: STUDENT IN AN ORGANIZED HEALTH CARE EDUCATION/TRAINING PROGRAM

## 2023-07-26 PROCEDURE — 0PP304Z REMOVAL OF INTERNAL FIXATION DEVICE FROM CERVICAL VERTEBRA, OPEN APPROACH: ICD-10-PCS | Performed by: NEUROLOGICAL SURGERY

## 2023-07-26 PROCEDURE — 0RG60K1 FUSION OF THORACIC VERTEBRAL JOINT WITH NONAUTOLOGOUS TISSUE SUBSTITUTE, POSTERIOR APPROACH, POSTERIOR COLUMN, OPEN APPROACH: ICD-10-PCS | Performed by: NEUROLOGICAL SURGERY

## 2023-07-26 PROCEDURE — 6370000000 HC RX 637 (ALT 250 FOR IP): Performed by: NEUROLOGICAL SURGERY

## 2023-07-26 PROCEDURE — 85014 HEMATOCRIT: CPT

## 2023-07-26 PROCEDURE — 22853 INSJ BIOMECHANICAL DEVICE: CPT | Performed by: NEUROLOGICAL SURGERY

## 2023-07-26 PROCEDURE — 2580000003 HC RX 258: Performed by: STUDENT IN AN ORGANIZED HEALTH CARE EDUCATION/TRAINING PROGRAM

## 2023-07-26 PROCEDURE — 22220 OSTEOT DSC ANT 1 VRT SGM CRV: CPT | Performed by: NEUROLOGICAL SURGERY

## 2023-07-26 PROCEDURE — 2500000003 HC RX 250 WO HCPCS: Performed by: NEUROLOGICAL SURGERY

## 2023-07-26 PROCEDURE — 84520 ASSAY OF UREA NITROGEN: CPT

## 2023-07-26 PROCEDURE — 6370000000 HC RX 637 (ALT 250 FOR IP)

## 2023-07-26 PROCEDURE — 82330 ASSAY OF CALCIUM: CPT

## 2023-07-26 PROCEDURE — 0RG40K1 FUSION OF CERVICOTHORACIC VERTEBRAL JOINT WITH NONAUTOLOGOUS TISSUE SUBSTITUTE, POSTERIOR APPROACH, POSTERIOR COLUMN, OPEN APPROACH: ICD-10-PCS | Performed by: NEUROLOGICAL SURGERY

## 2023-07-26 PROCEDURE — 2000000000 HC ICU R&B

## 2023-07-26 PROCEDURE — 6360000002 HC RX W HCPCS: Performed by: NEUROLOGICAL SURGERY

## 2023-07-26 PROCEDURE — 3600000005 HC SURGERY LEVEL 5 BASE: Performed by: NEUROLOGICAL SURGERY

## 2023-07-26 PROCEDURE — 82947 ASSAY GLUCOSE BLOOD QUANT: CPT

## 2023-07-26 PROCEDURE — L0120 CERV FLEX N/ADJ FOAM PRE OTS: HCPCS | Performed by: NEUROLOGICAL SURGERY

## 2023-07-26 PROCEDURE — 22842 INSERT SPINE FIXATION DEVICE: CPT | Performed by: NEUROLOGICAL SURGERY

## 2023-07-26 PROCEDURE — 0RB30ZZ EXCISION OF CERVICAL VERTEBRAL DISC, OPEN APPROACH: ICD-10-PCS | Performed by: NEUROLOGICAL SURGERY

## 2023-07-26 PROCEDURE — C9290 INJ, BUPIVACAINE LIPOSOME: HCPCS | Performed by: NEUROLOGICAL SURGERY

## 2023-07-26 PROCEDURE — 3700000000 HC ANESTHESIA ATTENDED CARE: Performed by: NEUROLOGICAL SURGERY

## 2023-07-26 PROCEDURE — C1889 IMPLANT/INSERT DEVICE, NOC: HCPCS | Performed by: NEUROLOGICAL SURGERY

## 2023-07-26 PROCEDURE — 4A11X4G MONITORING OF PERIPHERAL NERVOUS ELECTRICAL ACTIVITY, INTRAOPERATIVE, EXTERNAL APPROACH: ICD-10-PCS | Performed by: NEUROLOGICAL SURGERY

## 2023-07-26 DEVICE — GRAFT CELLR BNE MATRX INFLUX SPARC 5CC: Type: IMPLANTABLE DEVICE | Site: SPINE CERVICAL | Status: FUNCTIONAL

## 2023-07-26 DEVICE — SET SCR SPNL TI ALLY OCCIPITOCERVICOTHORACIC STREAMLINE OCT: Type: IMPLANTABLE DEVICE | Site: SPINE CERVICAL | Status: FUNCTIONAL

## 2023-07-26 DEVICE — DURASEAL® EXACT SPINAL SEALANT SYSTEM 3ML 5 PACK
Type: IMPLANTABLE DEVICE | Site: SPINE CERVICAL | Status: FUNCTIONAL
Brand: DURASEAL EXACT SPINAL SEALANT SYSTEM

## 2023-07-26 DEVICE — GRAFT CELLR BNE MATRX INFLUX SPARC 2CC: Type: IMPLANTABLE DEVICE | Site: SPINE CERVICAL | Status: FUNCTIONAL

## 2023-07-26 DEVICE — IMPLANTABLE DEVICE: Type: IMPLANTABLE DEVICE | Site: SPINE CERVICAL | Status: FUNCTIONAL

## 2023-07-26 DEVICE — CAGE SPNL W14XH8MM D12MM 6DEG ANT CERV INTBDY FUS LORD W/: Type: IMPLANTABLE DEVICE | Site: SPINE CERVICAL | Status: FUNCTIONAL

## 2023-07-26 RX ORDER — CEFAZOLIN SODIUM 1 G/3ML
INJECTION, POWDER, FOR SOLUTION INTRAMUSCULAR; INTRAVENOUS PRN
Status: DISCONTINUED | OUTPATIENT
Start: 2023-07-26 | End: 2023-07-26 | Stop reason: SDUPTHER

## 2023-07-26 RX ORDER — ONDANSETRON 2 MG/ML
4 INJECTION INTRAMUSCULAR; INTRAVENOUS EVERY 6 HOURS PRN
Status: DISCONTINUED | OUTPATIENT
Start: 2023-07-26 | End: 2023-08-04 | Stop reason: HOSPADM

## 2023-07-26 RX ORDER — SODIUM CHLORIDE, SODIUM LACTATE, POTASSIUM CHLORIDE, CALCIUM CHLORIDE 600; 310; 30; 20 MG/100ML; MG/100ML; MG/100ML; MG/100ML
INJECTION, SOLUTION INTRAVENOUS CONTINUOUS PRN
Status: DISCONTINUED | OUTPATIENT
Start: 2023-07-26 | End: 2023-07-26 | Stop reason: SDUPTHER

## 2023-07-26 RX ORDER — EPHEDRINE SULFATE/0.9% NACL/PF 50 MG/5 ML
SYRINGE (ML) INTRAVENOUS PRN
Status: DISCONTINUED | OUTPATIENT
Start: 2023-07-26 | End: 2023-07-26 | Stop reason: SDUPTHER

## 2023-07-26 RX ORDER — SODIUM CHLORIDE 9 MG/ML
INJECTION, SOLUTION INTRAVENOUS CONTINUOUS PRN
Status: DISCONTINUED | OUTPATIENT
Start: 2023-07-26 | End: 2023-07-26 | Stop reason: SDUPTHER

## 2023-07-26 RX ORDER — CHLORHEXIDINE GLUCONATE 0.12 MG/ML
15 RINSE ORAL 2 TIMES DAILY
Status: DISCONTINUED | OUTPATIENT
Start: 2023-07-26 | End: 2023-07-27

## 2023-07-26 RX ORDER — DEXAMETHASONE SODIUM PHOSPHATE 10 MG/ML
INJECTION INTRAMUSCULAR; INTRAVENOUS PRN
Status: DISCONTINUED | OUTPATIENT
Start: 2023-07-26 | End: 2023-07-26 | Stop reason: SDUPTHER

## 2023-07-26 RX ORDER — LIDOCAINE HYDROCHLORIDE AND EPINEPHRINE 10; 10 MG/ML; UG/ML
INJECTION, SOLUTION INFILTRATION; PERINEURAL PRN
Status: DISCONTINUED | OUTPATIENT
Start: 2023-07-26 | End: 2023-07-26 | Stop reason: HOSPADM

## 2023-07-26 RX ORDER — ONDANSETRON 4 MG/1
4 TABLET, ORALLY DISINTEGRATING ORAL EVERY 8 HOURS PRN
Status: DISCONTINUED | OUTPATIENT
Start: 2023-07-26 | End: 2023-08-04 | Stop reason: HOSPADM

## 2023-07-26 RX ORDER — SODIUM CHLORIDE 9 MG/ML
INJECTION, SOLUTION INTRAVENOUS CONTINUOUS
Status: DISCONTINUED | OUTPATIENT
Start: 2023-07-26 | End: 2023-07-31

## 2023-07-26 RX ORDER — FENTANYL CITRATE 50 UG/ML
25 INJECTION, SOLUTION INTRAMUSCULAR; INTRAVENOUS EVERY 5 MIN PRN
Status: DISCONTINUED | OUTPATIENT
Start: 2023-07-26 | End: 2023-07-26

## 2023-07-26 RX ORDER — SODIUM CHLORIDE 9 MG/ML
INJECTION, SOLUTION INTRAVENOUS PRN
Status: DISCONTINUED | OUTPATIENT
Start: 2023-07-26 | End: 2023-08-04 | Stop reason: HOSPADM

## 2023-07-26 RX ORDER — LIDOCAINE HYDROCHLORIDE 10 MG/ML
INJECTION, SOLUTION EPIDURAL; INFILTRATION; INTRACAUDAL; PERINEURAL PRN
Status: DISCONTINUED | OUTPATIENT
Start: 2023-07-26 | End: 2023-07-26 | Stop reason: SDUPTHER

## 2023-07-26 RX ORDER — VANCOMYCIN HYDROCHLORIDE 1 G/20ML
INJECTION, POWDER, LYOPHILIZED, FOR SOLUTION INTRAVENOUS PRN
Status: DISCONTINUED | OUTPATIENT
Start: 2023-07-26 | End: 2023-07-26 | Stop reason: HOSPADM

## 2023-07-26 RX ORDER — SODIUM CHLORIDE 0.9 % (FLUSH) 0.9 %
5-40 SYRINGE (ML) INJECTION PRN
Status: DISCONTINUED | OUTPATIENT
Start: 2023-07-26 | End: 2023-08-04 | Stop reason: HOSPADM

## 2023-07-26 RX ORDER — SODIUM CHLORIDE, SODIUM LACTATE, POTASSIUM CHLORIDE, CALCIUM CHLORIDE 600; 310; 30; 20 MG/100ML; MG/100ML; MG/100ML; MG/100ML
INJECTION, SOLUTION INTRAVENOUS CONTINUOUS
Status: DISCONTINUED | OUTPATIENT
Start: 2023-07-26 | End: 2023-07-26

## 2023-07-26 RX ORDER — SCOLOPAMINE TRANSDERMAL SYSTEM 1 MG/1
1 PATCH, EXTENDED RELEASE TRANSDERMAL
Status: DISCONTINUED | OUTPATIENT
Start: 2023-07-26 | End: 2023-08-04 | Stop reason: HOSPADM

## 2023-07-26 RX ORDER — PROPOFOL 10 MG/ML
5-50 INJECTION, EMULSION INTRAVENOUS CONTINUOUS
Status: DISCONTINUED | OUTPATIENT
Start: 2023-07-26 | End: 2023-07-27

## 2023-07-26 RX ORDER — MAGNESIUM HYDROXIDE 1200 MG/15ML
LIQUID ORAL CONTINUOUS PRN
Status: COMPLETED | OUTPATIENT
Start: 2023-07-26 | End: 2023-07-26

## 2023-07-26 RX ORDER — SUCCINYLCHOLINE/SOD CL,ISO/PF 100 MG/5ML
SYRINGE (ML) INTRAVENOUS PRN
Status: DISCONTINUED | OUTPATIENT
Start: 2023-07-26 | End: 2023-07-26 | Stop reason: SDUPTHER

## 2023-07-26 RX ORDER — SODIUM CHLORIDE 9 MG/ML
INJECTION, SOLUTION INTRAVENOUS PRN
Status: DISCONTINUED | OUTPATIENT
Start: 2023-07-26 | End: 2023-07-31

## 2023-07-26 RX ORDER — SODIUM CHLORIDE 0.9 % (FLUSH) 0.9 %
5-40 SYRINGE (ML) INJECTION EVERY 12 HOURS SCHEDULED
Status: DISCONTINUED | OUTPATIENT
Start: 2023-07-26 | End: 2023-08-04 | Stop reason: HOSPADM

## 2023-07-26 RX ORDER — ROCURONIUM BROMIDE 10 MG/ML
INJECTION, SOLUTION INTRAVENOUS PRN
Status: DISCONTINUED | OUTPATIENT
Start: 2023-07-26 | End: 2023-07-26 | Stop reason: SDUPTHER

## 2023-07-26 RX ORDER — MIDAZOLAM HYDROCHLORIDE 1 MG/ML
INJECTION INTRAMUSCULAR; INTRAVENOUS PRN
Status: DISCONTINUED | OUTPATIENT
Start: 2023-07-26 | End: 2023-07-26 | Stop reason: SDUPTHER

## 2023-07-26 RX ORDER — PROPOFOL 10 MG/ML
INJECTION, EMULSION INTRAVENOUS CONTINUOUS PRN
Status: DISCONTINUED | OUTPATIENT
Start: 2023-07-26 | End: 2023-07-26 | Stop reason: SDUPTHER

## 2023-07-26 RX ORDER — FENTANYL CITRATE 50 UG/ML
INJECTION, SOLUTION INTRAMUSCULAR; INTRAVENOUS PRN
Status: DISCONTINUED | OUTPATIENT
Start: 2023-07-26 | End: 2023-07-26 | Stop reason: SDUPTHER

## 2023-07-26 RX ORDER — PROPOFOL 10 MG/ML
INJECTION, EMULSION INTRAVENOUS PRN
Status: DISCONTINUED | OUTPATIENT
Start: 2023-07-26 | End: 2023-07-26 | Stop reason: SDUPTHER

## 2023-07-26 RX ORDER — SODIUM CHLORIDE 0.9 % (FLUSH) 0.9 %
5-40 SYRINGE (ML) INJECTION PRN
Status: DISCONTINUED | OUTPATIENT
Start: 2023-07-26 | End: 2023-07-31

## 2023-07-26 RX ADMIN — FENTANYL CITRATE 25 MCG: 0.05 INJECTION, SOLUTION INTRAMUSCULAR; INTRAVENOUS at 09:48

## 2023-07-26 RX ADMIN — CEFAZOLIN 2 G: 1 INJECTION, POWDER, FOR SOLUTION INTRAMUSCULAR; INTRAVENOUS at 09:29

## 2023-07-26 RX ADMIN — ROCURONIUM BROMIDE 50 MG: 10 SOLUTION INTRAVENOUS at 21:22

## 2023-07-26 RX ADMIN — Medication 5 MG: at 09:04

## 2023-07-26 RX ADMIN — ROCURONIUM BROMIDE 10 MG: 10 SOLUTION INTRAVENOUS at 11:10

## 2023-07-26 RX ADMIN — Medication 5 MG: at 09:25

## 2023-07-26 RX ADMIN — PROPOFOL 200 MG: 10 INJECTION, EMULSION INTRAVENOUS at 08:20

## 2023-07-26 RX ADMIN — Medication 100 MG: at 08:20

## 2023-07-26 RX ADMIN — SODIUM CHLORIDE, SODIUM LACTATE, POTASSIUM CHLORIDE, CALCIUM CHLORIDE: 600; 310; 30; 20 INJECTION, SOLUTION INTRAVENOUS at 14:55

## 2023-07-26 RX ADMIN — ROCURONIUM BROMIDE 50 MG: 10 SOLUTION INTRAVENOUS at 16:12

## 2023-07-26 RX ADMIN — MIDAZOLAM 2 MG: 1 INJECTION INTRAMUSCULAR; INTRAVENOUS at 08:46

## 2023-07-26 RX ADMIN — DEXAMETHASONE SODIUM PHOSPHATE 10 MG: 10 INJECTION INTRAMUSCULAR; INTRAVENOUS at 08:26

## 2023-07-26 RX ADMIN — SODIUM CHLORIDE: 9 INJECTION, SOLUTION INTRAVENOUS at 22:53

## 2023-07-26 RX ADMIN — ROCURONIUM BROMIDE 40 MG: 10 SOLUTION INTRAVENOUS at 15:50

## 2023-07-26 RX ADMIN — SODIUM CHLORIDE, POTASSIUM CHLORIDE, SODIUM LACTATE AND CALCIUM CHLORIDE: 600; 310; 30; 20 INJECTION, SOLUTION INTRAVENOUS at 08:18

## 2023-07-26 RX ADMIN — SODIUM CHLORIDE, PRESERVATIVE FREE 10 ML: 5 INJECTION INTRAVENOUS at 23:00

## 2023-07-26 RX ADMIN — Medication 2 G: at 17:11

## 2023-07-26 RX ADMIN — PROPOFOL 200 MCG/KG/MIN: 10 INJECTION, EMULSION INTRAVENOUS at 08:35

## 2023-07-26 RX ADMIN — Medication 2 G: at 13:22

## 2023-07-26 RX ADMIN — Medication 5 MG: at 08:40

## 2023-07-26 RX ADMIN — Medication 2 G: at 21:01

## 2023-07-26 RX ADMIN — SUFENTANIL CITRATE 0.25 MCG/KG/HR: 50 INJECTION EPIDURAL; INTRAVENOUS at 08:35

## 2023-07-26 RX ADMIN — SODIUM CHLORIDE, PRESERVATIVE FREE 10 ML: 5 INJECTION INTRAVENOUS at 23:03

## 2023-07-26 RX ADMIN — ROCURONIUM BROMIDE 50 MG: 10 SOLUTION INTRAVENOUS at 10:19

## 2023-07-26 RX ADMIN — SODIUM CHLORIDE: 9 INJECTION, SOLUTION INTRAVENOUS at 08:25

## 2023-07-26 RX ADMIN — SODIUM CHLORIDE, POTASSIUM CHLORIDE, SODIUM LACTATE AND CALCIUM CHLORIDE: 600; 310; 30; 20 INJECTION, SOLUTION INTRAVENOUS at 08:08

## 2023-07-26 RX ADMIN — FENTANYL CITRATE 75 MCG: 0.05 INJECTION, SOLUTION INTRAMUSCULAR; INTRAVENOUS at 08:20

## 2023-07-26 RX ADMIN — LIDOCAINE HYDROCHLORIDE 50 MG: 10 INJECTION, SOLUTION EPIDURAL; INFILTRATION; INTRACAUDAL; PERINEURAL at 08:20

## 2023-07-26 RX ADMIN — ROCURONIUM BROMIDE 40 MG: 10 SOLUTION INTRAVENOUS at 08:57

## 2023-07-26 RX ADMIN — CHLORHEXIDINE GLUCONATE 15 ML: 1.2 RINSE ORAL at 23:08

## 2023-07-26 RX ADMIN — PROPOFOL 5 MCG/KG/MIN: 10 INJECTION, EMULSION INTRAVENOUS at 22:53

## 2023-07-26 RX ADMIN — SODIUM CHLORIDE, PRESERVATIVE FREE 20 MG: 5 INJECTION INTRAVENOUS at 23:02

## 2023-07-26 RX ADMIN — ROCURONIUM BROMIDE 10 MG: 10 SOLUTION INTRAVENOUS at 08:20

## 2023-07-26 ASSESSMENT — PULMONARY FUNCTION TESTS
PIF_VALUE: 19
PIF_VALUE: 13

## 2023-07-26 ASSESSMENT — PAIN - FUNCTIONAL ASSESSMENT: PAIN_FUNCTIONAL_ASSESSMENT: 0-10

## 2023-07-26 NOTE — ANESTHESIA PRE PROCEDURE
Department of Anesthesiology  Preprocedure Note       Name:  Norris Lindquist   Age:  68 y.o.  :  1950                                          MRN:  [id number]         Date:  2023      Surgeon: Bryce Finnegan):  Bran Faye, [address] [place] [address],     Procedure: Procedure(s):  ANTERIOR CERVICAL DISCECTOMY, FUSION  C6-7  (SUPINE, C-ARM, O-ARM, SILVERCORD, MIZUHO TABLE, EVOKES CONF# [id number] - ALLISON)  POSTERIOR CERVICAL LAMINECTOMY C1-2, CERVICAL FUSION C2-T2  (PRONE, PADDY TABLE)    Medications prior to admission:   Prior to Admission medications    Medication Sig Start Date End Date Taking? Authorizing Provider   citalopram (CELEXA) 20 MG tablet Take 1 tablet by mouth daily 23   Jayson Pearl MD   Calcium Carbonate-Vitamin D 500-3. 125 MG-MCG TABS Take 1 tablet by mouth at night 23   Jayson Pearl MD   gabapentin (NEURONTIN) 100 MG capsule Take 1 capsule by mouth 2 times daily for 30 days.   Patient not taking: Reported on 2023  Essence Escobar MD   furosemide (LASIX) 40 MG tablet Take 1 tablet by mouth 3 times daily 23   Essence Escobar MD   carvedilol (COREG) 12.5 MG tablet Take 2 tablets by mouth 2 times daily 23  Ksenia Guaman MD   busPIRone (BUSPAR) 5 MG tablet Take 1 tablet by mouth 3 times daily 23   Ksenia Guaman MD   apixaban (ELIQUIS) 5 MG TABS tablet Take 1 tablet by mouth 2 times daily 23   Essence Escobar MD   hydrALAZINE (APRESOLINE) 25 MG tablet Take 1 tablet by mouth 3 times daily 23   Essence Escobar MD   atorvastatin (LIPITOR) 10 MG tablet Take 1 tablet by mouth daily 23   Jerman Crowder MD   potassium chloride (KLOR-CON M) 20 MEQ extended release tablet Take 1 tablet by mouth daily 23   Jerman Crowder MD   fluconazole (DIFLUCAN) 150 MG tablet Take 1 tablet by mouth every 72 hours 23   Essence Escobar MD   pantoprazole (PROTONIX) 40 MG tablet Take 1 tablet by mouth every morning (before breakfast) 23   Cortney Haider

## 2023-07-26 NOTE — INTERVAL H&P NOTE
Pt Name: Aracelis Agosto  MRN: [id number]  YOB: 1950  Date of evaluation: 7/26/2023    I have reviewed the patient's history and physical examination completed in pre-admission testing on 7/12/23    No changes to history or on examination today, unless noted below. As noted in H&P above from PAT, history of bilateral upper extremity ecchymosis. Today, noted to have Left posterior shoulder/upper arm with yellowish ecchymosis and scabbed abrasion, states this is resolving bruising from a fall on the \"4th of July\" and states she was evaluated after this and had imaging of left arm and CT of head that were negative, these are resulted in epic. No changes to history per the patient.      MAIK Rouse CNP  7/26/23  7:34 AM

## 2023-07-26 NOTE — ANESTHESIA PROCEDURE NOTES
Arterial Line:    An arterial line was placed using ultrasound guidance, in the OR for the following indication(s): continuous blood pressure monitoring and blood sampling needed. A  (size) (length), Arrow (type) catheter was placed, Seldinger technique used, into the right radial artery, secured by Tegaderm. Anesthesia type: General    Events:  patient tolerated procedure well with no complications. 7/26/2023 8:38 AM7/26/2023 8:42 AM  Anesthesiologist: Jovi Pisano MD  Performed: Anesthesiologist and Other anesthesia staff   Preanesthetic Checklist  Completed: patient identified, IV checked, site marked, risks and benefits discussed, surgical/procedural consents, equipment checked, pre-op evaluation, timeout performed, anesthesia consent given, oxygen available, monitors applied/VS acknowledged, fire risk safety assessment completed and verbalized and blood product R/B/A discussed and consented

## 2023-07-27 ENCOUNTER — APPOINTMENT (OUTPATIENT)
Dept: CT IMAGING | Age: 73
End: 2023-07-27
Attending: NEUROLOGICAL SURGERY
Payer: MEDICARE

## 2023-07-27 PROBLEM — M47.812 CERVICAL SPONDYLOSIS: Status: ACTIVE | Noted: 2023-07-27

## 2023-07-27 LAB
ANION GAP SERPL CALCULATED.3IONS-SCNC: 10 MMOL/L (ref 9–17)
BASOPHILS # BLD: <0.03 K/UL (ref 0–0.2)
BASOPHILS NFR BLD: 0 % (ref 0–2)
BUN SERPL-MCNC: 16 MG/DL (ref 8–23)
CALCIUM SERPL-MCNC: 7.3 MG/DL (ref 8.6–10.4)
CHLORIDE SERPL-SCNC: 115 MMOL/L (ref 98–107)
CO2 SERPL-SCNC: 19 MMOL/L (ref 20–31)
CREAT SERPL-MCNC: 0.8 MG/DL (ref 0.5–0.9)
EOSINOPHIL # BLD: <0.03 K/UL (ref 0–0.44)
EOSINOPHILS RELATIVE PERCENT: 0 % (ref 1–4)
ERYTHROCYTE [DISTWIDTH] IN BLOOD BY AUTOMATED COUNT: 12.9 % (ref 11.8–14.4)
EST. AVERAGE GLUCOSE BLD GHB EST-MCNC: 120 MG/DL
FIO2: 40
GFR SERPL CREATININE-BSD FRML MDRD: >60 ML/MIN/1.73M2
GLUCOSE BLD-MCNC: 119 MG/DL (ref 74–100)
GLUCOSE SERPL-MCNC: 134 MG/DL (ref 70–99)
HBA1C MFR BLD: 5.8 % (ref 4–6)
HCT VFR BLD AUTO: 31.8 % (ref 36.3–47.1)
HGB BLD-MCNC: 10.5 G/DL (ref 11.9–15.1)
IMM GRANULOCYTES # BLD AUTO: 0.06 K/UL (ref 0–0.3)
IMM GRANULOCYTES NFR BLD: 0 %
LYMPHOCYTES NFR BLD: 1.04 K/UL (ref 1.1–3.7)
LYMPHOCYTES RELATIVE PERCENT: 7 % (ref 24–43)
MCH RBC QN AUTO: 32.5 PG (ref 25.2–33.5)
MCHC RBC AUTO-ENTMCNC: 33 G/DL (ref 28.4–34.8)
MCV RBC AUTO: 98.5 FL (ref 82.6–102.9)
MICROORGANISM SPEC CULT: NO GROWTH
MONOCYTES NFR BLD: 0.93 K/UL (ref 0.1–1.2)
MONOCYTES NFR BLD: 7 % (ref 3–12)
NEGATIVE BASE EXCESS, ART: 6.3 MMOL/L (ref 0–2)
NEUTROPHILS NFR BLD: 86 % (ref 36–65)
NEUTS SEG NFR BLD: 12.02 K/UL (ref 1.5–8.1)
NRBC BLD-RTO: 0 PER 100 WBC
PLATELET # BLD AUTO: 260 K/UL (ref 138–453)
PMV BLD AUTO: 9.2 FL (ref 8.1–13.5)
POC HCO3: 20.2 MMOL/L (ref 21–28)
POC O2 SATURATION: 98.6 % (ref 94–98)
POC PCO2: 43.5 MM HG (ref 35–48)
POC PH: 7.28 (ref 7.35–7.45)
POC PO2: 134.8 MM HG (ref 83–108)
POTASSIUM SERPL-SCNC: 4.1 MMOL/L (ref 3.7–5.3)
RBC # BLD AUTO: 3.23 M/UL (ref 3.95–5.11)
SAMPLE SITE: ABNORMAL
SODIUM SERPL-SCNC: 144 MMOL/L (ref 135–144)
SPECIMEN DESCRIPTION: NORMAL
WBC OTHER # BLD: 14.1 K/UL (ref 3.5–11.3)

## 2023-07-27 PROCEDURE — 94002 VENT MGMT INPAT INIT DAY: CPT

## 2023-07-27 PROCEDURE — 72125 CT NECK SPINE W/O DYE: CPT

## 2023-07-27 PROCEDURE — 6360000002 HC RX W HCPCS

## 2023-07-27 PROCEDURE — 83036 HEMOGLOBIN GLYCOSYLATED A1C: CPT

## 2023-07-27 PROCEDURE — 80048 BASIC METABOLIC PNL TOTAL CA: CPT

## 2023-07-27 PROCEDURE — 82803 BLOOD GASES ANY COMBINATION: CPT

## 2023-07-27 PROCEDURE — 99291 CRITICAL CARE FIRST HOUR: CPT | Performed by: PSYCHIATRY & NEUROLOGY

## 2023-07-27 PROCEDURE — 85027 COMPLETE CBC AUTOMATED: CPT

## 2023-07-27 PROCEDURE — 94761 N-INVAS EAR/PLS OXIMETRY MLT: CPT

## 2023-07-27 PROCEDURE — 2000000000 HC ICU R&B

## 2023-07-27 PROCEDURE — 2700000000 HC OXYGEN THERAPY PER DAY

## 2023-07-27 PROCEDURE — 6370000000 HC RX 637 (ALT 250 FOR IP): Performed by: PSYCHIATRY & NEUROLOGY

## 2023-07-27 PROCEDURE — 2580000003 HC RX 258

## 2023-07-27 PROCEDURE — 82947 ASSAY GLUCOSE BLOOD QUANT: CPT

## 2023-07-27 PROCEDURE — 6360000002 HC RX W HCPCS: Performed by: PSYCHIATRY & NEUROLOGY

## 2023-07-27 PROCEDURE — 6370000000 HC RX 637 (ALT 250 FOR IP): Performed by: STUDENT IN AN ORGANIZED HEALTH CARE EDUCATION/TRAINING PROGRAM

## 2023-07-27 PROCEDURE — 6370000000 HC RX 637 (ALT 250 FOR IP)

## 2023-07-27 PROCEDURE — 37799 UNLISTED PX VASCULAR SURGERY: CPT

## 2023-07-27 PROCEDURE — 6360000002 HC RX W HCPCS: Performed by: STUDENT IN AN ORGANIZED HEALTH CARE EDUCATION/TRAINING PROGRAM

## 2023-07-27 PROCEDURE — APPSS30 APP SPLIT SHARED TIME 16-30 MINUTES: Performed by: PHYSICIAN ASSISTANT

## 2023-07-27 PROCEDURE — 92610 EVALUATE SWALLOWING FUNCTION: CPT

## 2023-07-27 PROCEDURE — 2500000003 HC RX 250 WO HCPCS

## 2023-07-27 PROCEDURE — 2580000003 HC RX 258: Performed by: STUDENT IN AN ORGANIZED HEALTH CARE EDUCATION/TRAINING PROGRAM

## 2023-07-27 RX ORDER — AMOXICILLIN 250 MG
1 CAPSULE ORAL 2 TIMES DAILY
Status: DISCONTINUED | OUTPATIENT
Start: 2023-07-27 | End: 2023-07-27

## 2023-07-27 RX ORDER — HYDRALAZINE HYDROCHLORIDE 20 MG/ML
5 INJECTION INTRAMUSCULAR; INTRAVENOUS EVERY 6 HOURS PRN
Status: DISCONTINUED | OUTPATIENT
Start: 2023-07-27 | End: 2023-08-01

## 2023-07-27 RX ORDER — SENNA AND DOCUSATE SODIUM 50; 8.6 MG/1; MG/1
1 TABLET, FILM COATED ORAL 2 TIMES DAILY
Status: DISCONTINUED | OUTPATIENT
Start: 2023-07-27 | End: 2023-07-31

## 2023-07-27 RX ORDER — ACETAMINOPHEN 325 MG/1
650 TABLET ORAL EVERY 4 HOURS PRN
Status: DISCONTINUED | OUTPATIENT
Start: 2023-07-27 | End: 2023-07-29

## 2023-07-27 RX ORDER — HYDROXYZINE HYDROCHLORIDE 25 MG/1
25 TABLET, FILM COATED ORAL 3 TIMES DAILY PRN
Status: DISCONTINUED | OUTPATIENT
Start: 2023-07-27 | End: 2023-08-04 | Stop reason: HOSPADM

## 2023-07-27 RX ORDER — OXYCODONE HYDROCHLORIDE 5 MG/1
5 TABLET ORAL EVERY 4 HOURS PRN
Status: DISCONTINUED | OUTPATIENT
Start: 2023-07-27 | End: 2023-07-28

## 2023-07-27 RX ORDER — ENOXAPARIN SODIUM 100 MG/ML
40 INJECTION SUBCUTANEOUS 2 TIMES DAILY
Status: DISCONTINUED | OUTPATIENT
Start: 2023-07-27 | End: 2023-07-28

## 2023-07-27 RX ORDER — POLYETHYLENE GLYCOL 3350 17 G/17G
17 POWDER, FOR SOLUTION ORAL DAILY
Status: DISCONTINUED | OUTPATIENT
Start: 2023-07-27 | End: 2023-07-31

## 2023-07-27 RX ADMIN — OXYCODONE HYDROCHLORIDE 5 MG: 5 TABLET ORAL at 11:53

## 2023-07-27 RX ADMIN — HYDROMORPHONE HYDROCHLORIDE 0.5 MG: 1 INJECTION, SOLUTION INTRAMUSCULAR; INTRAVENOUS; SUBCUTANEOUS at 13:15

## 2023-07-27 RX ADMIN — OXYCODONE HYDROCHLORIDE 5 MG: 5 TABLET ORAL at 23:07

## 2023-07-27 RX ADMIN — SENNOSIDES AND DOCUSATE SODIUM 1 TABLET: 50; 8.6 TABLET ORAL at 11:49

## 2023-07-27 RX ADMIN — ACETAMINOPHEN 650 MG: 325 TABLET ORAL at 18:09

## 2023-07-27 RX ADMIN — OXYCODONE HYDROCHLORIDE 5 MG: 5 TABLET ORAL at 17:03

## 2023-07-27 RX ADMIN — SENNOSIDES AND DOCUSATE SODIUM 1 TABLET: 50; 8.6 TABLET ORAL at 19:56

## 2023-07-27 RX ADMIN — ENOXAPARIN SODIUM 40 MG: 100 INJECTION SUBCUTANEOUS at 22:00

## 2023-07-27 RX ADMIN — SODIUM CHLORIDE, PRESERVATIVE FREE 10 ML: 5 INJECTION INTRAVENOUS at 19:56

## 2023-07-27 RX ADMIN — HYDROMORPHONE HYDROCHLORIDE 0.25 MG: 1 INJECTION, SOLUTION INTRAMUSCULAR; INTRAVENOUS; SUBCUTANEOUS at 08:58

## 2023-07-27 RX ADMIN — Medication 25 MCG/HR: at 03:01

## 2023-07-27 RX ADMIN — SODIUM CHLORIDE, PRESERVATIVE FREE 10 ML: 5 INJECTION INTRAVENOUS at 19:57

## 2023-07-27 RX ADMIN — POLYETHYLENE GLYCOL 3350 17 G: 17 POWDER, FOR SOLUTION ORAL at 11:49

## 2023-07-27 RX ADMIN — Medication 2000 MG: at 13:14

## 2023-07-27 RX ADMIN — CHLORHEXIDINE GLUCONATE 15 ML: 1.2 RINSE ORAL at 08:20

## 2023-07-27 RX ADMIN — SODIUM CHLORIDE, PRESERVATIVE FREE 10 ML: 5 INJECTION INTRAVENOUS at 08:21

## 2023-07-27 RX ADMIN — HYDROMORPHONE HYDROCHLORIDE 0.25 MG: 1 INJECTION, SOLUTION INTRAMUSCULAR; INTRAVENOUS; SUBCUTANEOUS at 18:10

## 2023-07-27 RX ADMIN — Medication 2000 MG: at 04:35

## 2023-07-27 RX ADMIN — Medication 2000 MG: at 21:46

## 2023-07-27 RX ADMIN — HYDROXYZINE HYDROCHLORIDE 25 MG: 25 TABLET ORAL at 23:47

## 2023-07-27 ASSESSMENT — PAIN SCALES - GENERAL
PAINLEVEL_OUTOF10: 5
PAINLEVEL_OUTOF10: 8
PAINLEVEL_OUTOF10: 8
PAINLEVEL_OUTOF10: 10
PAINLEVEL_OUTOF10: 5
PAINLEVEL_OUTOF10: 10

## 2023-07-27 ASSESSMENT — PAIN DESCRIPTION - LOCATION
LOCATION: NECK
LOCATION: NECK
LOCATION: INCISION;NECK
LOCATION: NECK
LOCATION: NECK

## 2023-07-27 ASSESSMENT — PAIN DESCRIPTION - DESCRIPTORS: DESCRIPTORS: ACHING;DISCOMFORT

## 2023-07-27 ASSESSMENT — PULMONARY FUNCTION TESTS
PIF_VALUE: 14
PIF_VALUE: 16
PIF_VALUE: 14
PIF_VALUE: 15

## 2023-07-27 NOTE — BRIEF OP NOTE
Brief Postoperative Note      Patient: Ladarius Yu  YOB: 1950  MRN: [id number]    Date of Procedure: 7/26/2023    Pre-Op Diagnosis Codes:     * Cervical spondylosis [R84.141]     * Kyphosis of thoracic region, unspecified kyphosis type [M40.204]    Post-Op Diagnosis: Same       Procedure(s):  ANTERIOR CERVICAL DISCECTOMY, OSTEOTOMY, FUSION,  C6-7, CORRECTION OF DEFORMITY. POSTERIOR CERVICAL OSTEOTOMY C6-7, REMOVAL OF PREVIOUS HARDWARE, CERVICAL FUSION C2-T2, CORRECTION OF DEFORMITY. Surgeon(s):  DO Roberto Grijalva DO    Assistant:  Physician Assistant: Charity Day PA-C    Anesthesia: General    Estimated Blood Loss (mL): 318     Complications: None    Specimens:   ID Type Source Tests Collected by Time Destination   1 : URINE - CASTANEDA INSERTION Urine Urine, indwelling catheter CULTURE, URINE Estefani Macias RN 7/26/2023 1110        Implants:  Implant Name Type Inv.  Item Serial No.  Lot No. LRB No. Used Action   SYSTEM SPNL SEAL 3ML EXACT DURASEAL - [id number]  SYSTEM SPNL SEAL 3ML EXACT DURASEAL  [employer]- [id number] N/A 1 Implanted   GRAFT CELLR BNE MATRX INFLUX [address] [place] 2CC - [id number]  GRAFT CELLR BNE MATRX INFLUX 2817 [facility] [id number] [facility] INC- [id number] N/A 1 Implanted   CAGE SPNL [id number] D12MM 6DEG ANT CERV INTBDY FUS LORD W/ - [id number]  CAGE SPNL [id number] D12MM 6DEG ANT CERV INTBDY FUS LORD W/  [facility] [id number] N/A 1 Implanted   GRAFT CELLR BNE MATRX INFLUX [address] [place] 5C - [id number]  GRAFT CELLR BNE MATRX INFLUX [facility] [id number] [place] [id number] N/A 1 Implanted   GRAFT CELLR BNE MATRX INFLUX [address]C - [id number]  GRAFT CELLR BNE MATRX INFLUX [address] [id number] "[employer]" [id number] N/A 1 Implanted   GRAFT CELLR BNE MATRX INFLUX [address] [place] 5CC - [id number]  GRAFT CELLR BNE MATRX INFLUX [address] [id number] [employer] CBHHWYZF[employer]-WD [id number] N/A 1 Implanted   GRAFT CELLR BNE [address] Lawanda Wilson

## 2023-07-27 NOTE — PROGRESS NOTES
SLP ALL NOTES  Facility/Department: [address] NEURO ICU   CLINICAL BEDSIDE SWALLOW EVALUATION    NAME: Radha Feliz  : 1950  MRN: [id number]    ADMISSION DATE: 2023  ADMITTING DIAGNOSIS: has Other specified disorders of rotator cuff syndrome of shoulder and allied disorders; Diverticulosis of large intestine; Intestinal or peritoneal adhesions with obstruction (postoperative) (postinfection) ([address]); Restless legs syndrome (RLS); GERD (gastroesophageal reflux disease); Hypertension; Hyperlipidemia; Other abnormal glucose; Atherosclerosis; Allergic rhinitis; Vitamin D deficiency; Major depression; Degenerative joint disease (DJD) of hip; Peripheral edema; Injury of foot, left; Facial droop; CVA (cerebral vascular accident) ([address]); Bradycardia; Ataxia; Aphasia; TIA (transient ischemic attack); Need for prophylactic vaccination and inoculation against cholera alone; Osteopenia; Lumbago; Meralgia paresthetica of right side; Lumbar degenerative disc disease; Spondylosis of lumbar region without myelopathy or radiculopathy; Blood poisoning; Cellulitis of left lower extremity; Encounter for medication monitoring; Deep vein thrombosis (DVT) of right lower extremity ([address]); Chronic deep vein thrombosis (DVT) of proximal vein of both lower extremities ([address]); Chronic diastolic heart failure ([address]); Neurogenic claudication due to lumbar spinal stenosis; Stage 3 chronic kidney disease ([address]); Type 2 diabetes mellitus with circulatory disorder, without long-term current use of insulin ([address]); Chronic deep vein thrombosis (DVT) of popliteal vein of right lower extremity ([address]); Closed fracture of left wrist; B12 deficiency; Iron deficiency anemia secondary to inadequate dietary iron intake; Closed head injury; Scalp laceration; Abnormal finding on imaging; Other irritable bowel syndrome; Frequent falls; Double vision; Muscle soreness; Peptic ulcer; Melena; PUD (peptic ulcer disease); Absolute anemia;  Acute blood loss anemia;

## 2023-07-27 NOTE — PROGRESS NOTES
Order obtained for extubation. SpO2 of 98 on 30% FiO2. Patient extubated and placed on 2 liters/min via nasal cannula. Post extubation SpO2 is 95% with HR  81 bpm and RR 20 breaths/min. Patient had mild cough that was non-productive. Extubation Well tolerated by patient. .   Breath Sounds: clear    MICHAELELSA ESCALERA RCP   8:49 AM

## 2023-07-27 NOTE — PLAN OF CARE
Problem: Skin/Tissue Integrity  Goal: Absence of new skin breakdown  Description: 1. Monitor for areas of redness and/or skin breakdown  2. Assess vascular access sites hourly  3. Every 4-6 hours minimum:  Change oxygen saturation probe site  4. Every 4-6 hours:  If on nasal continuous positive airway pressure, respiratory therapy assess nares and determine need for appliance change or resting period.   Outcome: Progressing     Problem: Respiratory - Adult  Goal: Achieves optimal ventilation and oxygenation  7/27/2023 0753 by Magy Hayes RCP  Outcome: Progressing

## 2023-07-27 NOTE — CARE COORDINATION
Case Management Assessment  Initial Evaluation    Date/Time of Evaluation: 7/27/2023 2:51 PM  Assessment Completed by: Paulette Mallory RN    If patient is discharged prior to next notation, then this note serves as note for discharge by case management. Patient Name: Jackeline Lan                   YOB: 1950  Diagnosis: Cervical spondylosis [M47.812]  Kyphosis of thoracic region, unspecified kyphosis type [M40.204]  Status post surgery [Z98.890]                   Date / Time: 7/26/2023  5:45 AM    Patient Admission Status: Inpatient   Readmission Risk (Low < 19, Mod (19-27), High > 27): Readmission Risk Score: 17.4    Current PCP: Marli Monique MD  PCP verified by CM? (P) Yes (Dr. Marli Monique)    Chart Reviewed: Yes      History Provided by: (P) Patient, Medical Record  Patient Orientation: (P) Alert and Oriented, Person, Place    Patient Cognition: (P) Alert    Hospitalization in the last 30 days (Readmission):  No    If yes, Readmission Assessment in  Navigator will be completed.     Advance Directives:      Code Status: Full Code   Patient's Primary Decision Maker is: (P) Legal Next of Kin    Primary Decision MakerNeziyad Jackson  Spouse - [phone]    Secondary Decision Maker: Kati Vanessa Child - [phone]    Discharge Planning:    Patient lives with: (P) Spouse/Significant Other Type of Home: (P) Apartment  Primary Care Giver: (P) Self  Patient Support Systems include: (P) Spouse/Significant Other   Current Financial resources: (P) Medicare  Current community resources: (P) ECF/Home Care  Current services prior to admission: (P) None            Current DME:              Type of Home Care services:  (P) Skilled Therapy, Nursing Services    ADLS  Prior functional level: (P) Independent in ADLs/IADLs  Current functional level: (P) Assistance with the following:, Bathing, Dressing, Toileting, Mobility    PT AM-PAC:   /24  OT AM-PAC:   /24    Family can provide assistance at DC: (P)

## 2023-07-27 NOTE — PLAN OF CARE
Problem: Pain  Goal: Verbalizes/displays adequate comfort level or baseline comfort level  Recent Flowsheet Documentation  Taken 7/27/2023 0600 by Arabella Esquivel RN  Verbalizes/displays adequate comfort level or baseline comfort level:   Encourage patient to monitor pain and request assistance   Implement non-pharmacological measures as appropriate and evaluate response   Administer analgesics based on type and severity of pain and evaluate response   Assess pain using appropriate pain scale  Taken 7/27/2023 0400 by Arabella Esquivel RN  Verbalizes/displays adequate comfort level or baseline comfort level:   Assess pain using appropriate pain scale   Encourage patient to monitor pain and request assistance   Administer analgesics based on type and severity of pain and evaluate response   Implement non-pharmacological measures as appropriate and evaluate response  Taken 7/27/2023 0000 by Arabella Esquivel RN  Verbalizes/displays adequate comfort level or baseline comfort level:   Encourage patient to monitor pain and request assistance   Assess pain using appropriate pain scale   Administer analgesics based on type and severity of pain and evaluate response  Taken 7/26/2023 2215 by Arabella Esquivel RN  Verbalizes/displays adequate comfort level or baseline comfort level:   Encourage patient to monitor pain and request assistance   Assess pain using appropriate pain scale   Administer analgesics based on type and severity of pain and evaluate response   Implement non-pharmacological measures as appropriate and evaluate response     Problem: Respiratory - Adult  Goal: Achieves optimal ventilation and oxygenation  Recent Flowsheet Documentation  Taken 7/27/2023 0112 by Arabella Esquivel RN  Achieves optimal ventilation and oxygenation:   Assess for changes in respiratory status   Assess for changes in mentation and behavior   Position to facilitate oxygenation and minimize respiratory effort   Oxygen supplementation based on oxygen

## 2023-07-27 NOTE — ANESTHESIA POSTPROCEDURE EVALUATION
Department of Anesthesiology  Postprocedure Note    Patient: Aracelis Agosto  MRN: [id number]  YOB: 1950  Date of evaluation: 7/27/2023      Procedure Summary     Date: 07/26/23 Room / Location: [address]    Anesthesia Start: 0818 Anesthesia Stop: 2202    Procedures:       ANTERIOR CERVICAL DISCECTOMY, OSTEOTOMY, FUSION,  C6-7, CORRECTION OF DEFORMITY. (Spine Cervical)      POSTERIOR CERVICAL OSTEOTOMY C6-7, REMOVAL OF PREVIOUS HARDWARE, CERVICAL FUSION C2-T2, CORRECTION OF DEFORMITY. Diagnosis:       Cervical spondylosis      Kyphosis of thoracic region, unspecified kyphosis type      (Cervical spondylosis [N15.320])      (Kyphosis of thoracic region, unspecified kyphosis type [M40.204])    Surgeons: Morro Jhaveri DO Responsible Provider: Annita Sue MD    Anesthesia Type: general ASA Status: 3          Anesthesia Type: No value filed.     Elver Phase I: Elver Score: 10    Elver Phase II:        Anesthesia Post Evaluation    Patient location during evaluation: ICU  Patient participation: complete - patient cannot participate  Level of consciousness: sedated and ventilated  Airway patency: patent  Nausea & Vomiting: no nausea and no vomiting  Complications: no  Cardiovascular status: hemodynamically stable  Respiratory status: ventilator

## 2023-07-27 NOTE — OP NOTE
Operative Note      Patient: Norris Lindquist  YOB: 1950  MRN: [id number]    Date of Procedure: 7/26/2023    Pre-Op Diagnosis Codes:     * Cervical spondylosis [J54.913]     * Kyphosis of thoracic region, unspecified kyphosis type [M40.204]  Chin on neck deformity  Post-Op Diagnosis: Same       Procedure(s):  ANTERIOR CERVICAL DISCECTOMY, FUSION,  C6-7  PLACEMENT OF PEKK CAGE C6-7  ANTERIOR CERVICAL INTRADISCAL OSTEOTOMY C6-7, CORRECTION OF DEFORMITY. POSTERIOR CERVICAL CARD[place] OSTEOTOMY C6-7 CORRECTION OF DEFORMITY. REMOVAL OF PREVIOUS HARDWARE  SEGMENTAL FIXATION OF C2, C4,C5, T1, T2  CERVICAL FUSION C2-T2,   Computer neuro navigation  Neuromonitoring with SSEP MEP and. Triggered EMGs  Intraoperative ultrasonography    Modifier 22: Increased physical mental effort was required due to significant chin and neck deformity requiring extensive dissection to access anteriorly spine for correction, complex repair of a anterior dural tear, increased length for dissection time due to previous laminectomy fibrosis posteriorly, and difficulty removing previous hardware requiring extra 4 hours of operative time  Surgeon(s):  Bran Faye DO      Assistant:   Physician Assistant: Marlee Herzog PA-C  Assisted directly with exposure dissection retraction and closure  Anesthesia: General    Estimated Blood Loss (mL): 768     Complications: None    Specimens:   ID Type Source Tests Collected by Time Destination   1 : URINE - CASTANEDA INSERTION Urine Urine, indwelling catheter CULTURE, URINE Gem Hoang RN 7/26/2023 1110        Implants:  Implant Name Type Inv.  Item Serial No.  Lot No. LRB No. Used Action   SYSTEM SPNL SEAL 3ML EXACT DURASEAL - [id number]  SYSTEM SPNL SEAL 3ML EXACT Zelgor ANDRA- [id number] N/A 1 Implanted   GRAFT CELLR BNE [address] [facility] [address] [place] 2C - [id number]  GRAFT CELLR BNE 8 [facility] [address] [facility] [address] [phone] [address] [facility] [address] [facility] [address] N/A 1 MEP's shows no encroachment to the nerves. Repeat MEPs SSEPs shows a decrease in signal in the right side. I brought in the intraoperative ultrasound and sonicated thecal sac and found no abnormalities or compression. Intervention readjusted the electrodes in the scalp as well as decrease the amount of inhalation gas anesthetics and found the signal has returned to baseline. I then proceeded to place screws on C4-C5 vertebrae's with fixated using the previous holes using larger 4.0 mm x 12 mm screws. The C2 screws were pars screws and 4.0 x 20 mm bilaterally. T1-T2 screws were 4.5 x 35 mm. The facet joints and joint spaces were burred to expose bleeding bone. Wound thoroughly irrigated to ensure no active bleeding. two 100 mm cobalt chrome stu was bent and fixated onto the screw heads with setscrews final tightened. Morselized autologous bone influx DBX and stem cell combo was placed in the facet joints and  lateral gutters. This completes the fusion process. Final AP lateral x-ray shows good position of the screws. Final MEP SSEP remained stable at baseline. Final hemostasis was obtained after thorough irrigation with bipolar cautery. Exparel was infiltrated in the dermis and the subcutaneous and muscle layers. Hemostasis was ensured with bipolar cautery. A 10 [place] Hemovac drain was placed in subfascial space. Vancomycin powder was placed in the subfascial and subcutaneous space. Wound was closed in multiple layers with staple at the last layer and the skin was dressed. The coelho was then disconnected from the table. Patient was turned supine onto a bed, and the Coelho was removed. Patient recovered in the neuro ICU remained intubated. Instrument, sponge, and needle counts were correct prior to wound closure and at the conclusion of the case. All counts were correct prior to and end of closure.       Electronically signed by Bran Faye DO on 7/26/2023 at 9:42 PM

## 2023-07-27 NOTE — CONSULTS
Neuro Critical Care Consult Note    Reason for Consult:  Post op ACDF  Requesting Physician:  Dr. Dequan Sow   Attending Physician:     History Obtained From:  electronic medical record    CHIEF COMPLAINT:       Neck pain/ weakness     HISTORY OF PRESENT ILLNESS:       The patient is a 68 y.o. female with a history of CAD on eliquis, CHF, HTN, TIA, who presents following scheduled neurosurgical intervention for cervical spondylosis and thoracic kyphosis. Per medical record patient has been experiencing neck pain and weakness and left upper extremity paresthesias and weakness. Patient has a history of gait instability and frequent falls and has required previous cervical decompression and fusion. Patient presents to the neuro ICU following scheduled anterior cervical discectomy, fusion C6-7, posterior cervical laminectomy C1-2 and cervical fusion C2-T2. Following the procedure decision was made for the patient to remain intubated due to the length and complexity of the surgery. Patient arrives to the neuro ICU intubated on propofol for sedation and received paralytic agent per anesthesia prior to transfer and has limited physical examination on arrival. Neuro critical care was consulted for post operative medical management. PAST MEDICAL HISTORY :       Past Medical History:        Diagnosis Date    Allergic rhinitis, cause unspecified     Back pain     lumbar    Bowel obstruction (HCC)     history of due to scar tissue, resolved non-surgically    C. difficile diarrhea     CAD (coronary artery disease)     no stent needed per pt.  Dr. Leonardo Cardenas did cath at [facility] 2005    Cardiac murmur     Cellulitis 2017 August    leg left leg/bug bite    Cerebral artery occlusion with cerebral infarction [facility])     TIA 2014    COVID-19     ONE YR AGO IN 4/25/2020 fever and cough    Diverticulosis of colon (without mention of hemorrhage)     GERD (gastroesophageal reflux disease)     on rx protocol    CARDIOVASCULAR:  history of CAD on eliquis, CHF, HTN  - Borderline hypotensive on arrival, consider restarting home antihypertensive regimen once improved   - Hold eliquis   - Goal SBP normotensive   - Continue telemetry    PULMONARY:  - Vent Settings: fiO2 40%, rate 16, PEEP 5, tidal volume 400  - Daily ABG: ph 7.31, CO2 38.7, HCO3 19, pO2 223  - SBT in am    RENAL/FLUID/ELECTROLYTE:  - BUN 21/ Creatinine 0.7  - IVF: NS at 100ml/hr   - Replace electrolytes PRN  - Daily BMP    GI/NUTRITION:  NUTRITION:  No diet orders on file  - Bowel regimen: glyoclax as needed   - GI prophylaxis: Pepcid    ID:  - Afebrile  - Ancef 2 g every 8 hours x3 doses for surgical prophylaxis  - Continue to monitor for fevers  - Daily CBC    HEME:   - H&H 11.2/34.7  - Daily CBC    ENDOCRINE:  - Continue to monitor blood glucose, goal <180  - POC glucose ACHS  - Check hemoglobin A1c     OTHER:  - PT/OT/ST   - Code Status: Full    PROPHYLAXIS:  Stress ulcer: H2 blocker    DVT PROPHYLAXIS:  - SCD sleeves - Thigh High           We will continue to follow along. For any changes in exam or patient status please contact Neuro Critical Care.       MAIK Walter - CNP  Neuro Critical Care  Pager [phone]  7/26/2023     10:04 PM

## 2023-07-28 ENCOUNTER — APPOINTMENT (OUTPATIENT)
Dept: GENERAL RADIOLOGY | Age: 73
End: 2023-07-28
Attending: NEUROLOGICAL SURGERY
Payer: MEDICARE

## 2023-07-28 LAB
ALLEN TEST: POSITIVE
ANION GAP SERPL CALCULATED.3IONS-SCNC: 10 MMOL/L (ref 9–17)
BASOPHILS # BLD: 0.05 K/UL (ref 0–0.2)
BASOPHILS NFR BLD: 1 % (ref 0–2)
BUN SERPL-MCNC: 16 MG/DL (ref 8–23)
CALCIUM SERPL-MCNC: 7.4 MG/DL (ref 8.6–10.4)
CHLORIDE SERPL-SCNC: 114 MMOL/L (ref 98–107)
CO2 SERPL-SCNC: 20 MMOL/L (ref 20–31)
CREAT SERPL-MCNC: 0.8 MG/DL (ref 0.5–0.9)
EOSINOPHIL # BLD: 0.07 K/UL (ref 0–0.44)
EOSINOPHILS RELATIVE PERCENT: 1 % (ref 1–4)
ERYTHROCYTE [DISTWIDTH] IN BLOOD BY AUTOMATED COUNT: 13.6 % (ref 11.8–14.4)
FIO2: 28
GFR SERPL CREATININE-BSD FRML MDRD: >60 ML/MIN/1.73M2
GLUCOSE BLD-MCNC: 101 MG/DL (ref 74–100)
GLUCOSE SERPL-MCNC: 113 MG/DL (ref 70–99)
HCT VFR BLD AUTO: 28.9 % (ref 36.3–47.1)
HGB BLD-MCNC: 9.4 G/DL (ref 11.9–15.1)
IMM GRANULOCYTES # BLD AUTO: 0.03 K/UL (ref 0–0.3)
IMM GRANULOCYTES NFR BLD: 0 %
LYMPHOCYTES NFR BLD: 1.69 K/UL (ref 1.1–3.7)
LYMPHOCYTES RELATIVE PERCENT: 16 % (ref 24–43)
MCH RBC QN AUTO: 31.5 PG (ref 25.2–33.5)
MCHC RBC AUTO-ENTMCNC: 32.5 G/DL (ref 28.4–34.8)
MCV RBC AUTO: 97 FL (ref 82.6–102.9)
MONOCYTES NFR BLD: 1.11 K/UL (ref 0.1–1.2)
MONOCYTES NFR BLD: 11 % (ref 3–12)
NEGATIVE BASE EXCESS, ART: 3.1 MMOL/L (ref 0–2)
NEUTROPHILS NFR BLD: 71 % (ref 36–65)
NEUTS SEG NFR BLD: 7.36 K/UL (ref 1.5–8.1)
NRBC BLD-RTO: 0 PER 100 WBC
O2 DELIVERY DEVICE: ABNORMAL
PLATELET # BLD AUTO: 208 K/UL (ref 138–453)
PMV BLD AUTO: 9.5 FL (ref 8.1–13.5)
POC HCO3: 22 MMOL/L (ref 21–28)
POC O2 SATURATION: 96.3 % (ref 94–98)
POC PCO2: 38.7 MM HG (ref 35–48)
POC PH: 7.36 (ref 7.35–7.45)
POC PO2: 86.8 MM HG (ref 83–108)
POTASSIUM SERPL-SCNC: 3.7 MMOL/L (ref 3.7–5.3)
RBC # BLD AUTO: 2.98 M/UL (ref 3.95–5.11)
SAMPLE SITE: ABNORMAL
SODIUM SERPL-SCNC: 144 MMOL/L (ref 135–144)
WBC OTHER # BLD: 10.3 K/UL (ref 3.5–11.3)

## 2023-07-28 PROCEDURE — 71046 X-RAY EXAM CHEST 2 VIEWS: CPT

## 2023-07-28 PROCEDURE — 99231 SBSQ HOSP IP/OBS SF/LOW 25: CPT | Performed by: PSYCHIATRY & NEUROLOGY

## 2023-07-28 PROCEDURE — 82947 ASSAY GLUCOSE BLOOD QUANT: CPT

## 2023-07-28 PROCEDURE — 80048 BASIC METABOLIC PNL TOTAL CA: CPT

## 2023-07-28 PROCEDURE — 6360000002 HC RX W HCPCS

## 2023-07-28 PROCEDURE — 97162 PT EVAL MOD COMPLEX 30 MIN: CPT

## 2023-07-28 PROCEDURE — 2580000003 HC RX 258

## 2023-07-28 PROCEDURE — 82803 BLOOD GASES ANY COMBINATION: CPT

## 2023-07-28 PROCEDURE — 97530 THERAPEUTIC ACTIVITIES: CPT

## 2023-07-28 PROCEDURE — 2000000000 HC ICU R&B

## 2023-07-28 PROCEDURE — 6370000000 HC RX 637 (ALT 250 FOR IP): Performed by: PSYCHIATRY & NEUROLOGY

## 2023-07-28 PROCEDURE — 6360000002 HC RX W HCPCS: Performed by: STUDENT IN AN ORGANIZED HEALTH CARE EDUCATION/TRAINING PROGRAM

## 2023-07-28 PROCEDURE — APPSS30 APP SPLIT SHARED TIME 16-30 MINUTES: Performed by: PHYSICIAN ASSISTANT

## 2023-07-28 PROCEDURE — 6370000000 HC RX 637 (ALT 250 FOR IP): Performed by: PHYSICIAN ASSISTANT

## 2023-07-28 PROCEDURE — 6370000000 HC RX 637 (ALT 250 FOR IP): Performed by: STUDENT IN AN ORGANIZED HEALTH CARE EDUCATION/TRAINING PROGRAM

## 2023-07-28 PROCEDURE — 97535 SELF CARE MNGMENT TRAINING: CPT

## 2023-07-28 PROCEDURE — 97167 OT EVAL HIGH COMPLEX 60 MIN: CPT

## 2023-07-28 PROCEDURE — 36600 WITHDRAWAL OF ARTERIAL BLOOD: CPT

## 2023-07-28 PROCEDURE — 74230 X-RAY XM SWLNG FUNCJ C+: CPT

## 2023-07-28 PROCEDURE — 2580000003 HC RX 258: Performed by: STUDENT IN AN ORGANIZED HEALTH CARE EDUCATION/TRAINING PROGRAM

## 2023-07-28 PROCEDURE — 6360000002 HC RX W HCPCS: Performed by: NURSE PRACTITIONER

## 2023-07-28 PROCEDURE — 85027 COMPLETE CBC AUTOMATED: CPT

## 2023-07-28 PROCEDURE — 6370000000 HC RX 637 (ALT 250 FOR IP): Performed by: NURSE PRACTITIONER

## 2023-07-28 PROCEDURE — 92611 MOTION FLUOROSCOPY/SWALLOW: CPT

## 2023-07-28 PROCEDURE — 36415 COLL VENOUS BLD VENIPUNCTURE: CPT

## 2023-07-28 RX ORDER — ENOXAPARIN SODIUM 100 MG/ML
40 INJECTION SUBCUTANEOUS DAILY
Status: DISCONTINUED | OUTPATIENT
Start: 2023-07-28 | End: 2023-08-04 | Stop reason: HOSPADM

## 2023-07-28 RX ORDER — OXYCODONE HYDROCHLORIDE 5 MG/1
10 TABLET ORAL EVERY 4 HOURS PRN
Status: DISCONTINUED | OUTPATIENT
Start: 2023-07-28 | End: 2023-07-31

## 2023-07-28 RX ORDER — BISACODYL 10 MG
10 SUPPOSITORY, RECTAL RECTAL DAILY PRN
Status: DISCONTINUED | OUTPATIENT
Start: 2023-07-28 | End: 2023-08-04 | Stop reason: HOSPADM

## 2023-07-28 RX ORDER — OXYCODONE HYDROCHLORIDE 5 MG/1
5 TABLET ORAL EVERY 4 HOURS PRN
Status: DISCONTINUED | OUTPATIENT
Start: 2023-07-28 | End: 2023-07-31

## 2023-07-28 RX ADMIN — HYDROMORPHONE HYDROCHLORIDE 0.25 MG: 1 INJECTION, SOLUTION INTRAMUSCULAR; INTRAVENOUS; SUBCUTANEOUS at 05:17

## 2023-07-28 RX ADMIN — ACETAMINOPHEN 650 MG: 325 TABLET ORAL at 02:42

## 2023-07-28 RX ADMIN — OXYCODONE HYDROCHLORIDE 10 MG: 5 TABLET ORAL at 20:30

## 2023-07-28 RX ADMIN — SODIUM CHLORIDE, PRESERVATIVE FREE 10 ML: 5 INJECTION INTRAVENOUS at 20:37

## 2023-07-28 RX ADMIN — HYDRALAZINE HYDROCHLORIDE 5 MG: 20 INJECTION INTRAMUSCULAR; INTRAVENOUS at 17:19

## 2023-07-28 RX ADMIN — ENOXAPARIN SODIUM 40 MG: 100 INJECTION SUBCUTANEOUS at 08:40

## 2023-07-28 RX ADMIN — HYPROMELLOSE: 0 GEL OPHTHALMIC at 16:29

## 2023-07-28 RX ADMIN — HYDROMORPHONE HYDROCHLORIDE 0.5 MG: 1 INJECTION, SOLUTION INTRAMUSCULAR; INTRAVENOUS; SUBCUTANEOUS at 23:09

## 2023-07-28 RX ADMIN — POLYETHYLENE GLYCOL 3350 17 G: 17 POWDER, FOR SOLUTION ORAL at 08:20

## 2023-07-28 RX ADMIN — SENNOSIDES AND DOCUSATE SODIUM 1 TABLET: 50; 8.6 TABLET ORAL at 08:19

## 2023-07-28 RX ADMIN — HYDROMORPHONE HYDROCHLORIDE 0.5 MG: 1 INJECTION, SOLUTION INTRAMUSCULAR; INTRAVENOUS; SUBCUTANEOUS at 18:06

## 2023-07-28 RX ADMIN — SENNOSIDES AND DOCUSATE SODIUM 1 TABLET: 50; 8.6 TABLET ORAL at 20:30

## 2023-07-28 RX ADMIN — SODIUM CHLORIDE, PRESERVATIVE FREE 10 ML: 5 INJECTION INTRAVENOUS at 20:36

## 2023-07-28 RX ADMIN — HYDROMORPHONE HYDROCHLORIDE 0.25 MG: 1 INJECTION, SOLUTION INTRAMUSCULAR; INTRAVENOUS; SUBCUTANEOUS at 00:53

## 2023-07-28 RX ADMIN — OXYCODONE HYDROCHLORIDE 10 MG: 5 TABLET ORAL at 16:27

## 2023-07-28 RX ADMIN — HYDROMORPHONE HYDROCHLORIDE 0.5 MG: 1 INJECTION, SOLUTION INTRAMUSCULAR; INTRAVENOUS; SUBCUTANEOUS at 12:06

## 2023-07-28 RX ADMIN — OXYCODONE HYDROCHLORIDE 5 MG: 5 TABLET ORAL at 03:41

## 2023-07-28 RX ADMIN — OXYCODONE HYDROCHLORIDE 10 MG: 5 TABLET ORAL at 08:19

## 2023-07-28 RX ADMIN — SODIUM CHLORIDE, PRESERVATIVE FREE 10 ML: 5 INJECTION INTRAVENOUS at 08:21

## 2023-07-28 ASSESSMENT — PAIN SCALES - GENERAL
PAINLEVEL_OUTOF10: 10
PAINLEVEL_OUTOF10: 7
PAINLEVEL_OUTOF10: 7
PAINLEVEL_OUTOF10: 10
PAINLEVEL_OUTOF10: 7
PAINLEVEL_OUTOF10: 8
PAINLEVEL_OUTOF10: 7
PAINLEVEL_OUTOF10: 6
PAINLEVEL_OUTOF10: 8
PAINLEVEL_OUTOF10: 7
PAINLEVEL_OUTOF10: 10
PAINLEVEL_OUTOF10: 5

## 2023-07-28 ASSESSMENT — PAIN DESCRIPTION - LOCATION
LOCATION: NECK
LOCATION: NECK
LOCATION: NECK;INCISION
LOCATION: NECK

## 2023-07-28 ASSESSMENT — PAIN DESCRIPTION - DESCRIPTORS: DESCRIPTORS: ACHING

## 2023-07-28 NOTE — PLAN OF CARE
Problem: Discharge Planning  Goal: Discharge to home or other facility with appropriate resources  Outcome: Progressing     Problem: Chronic Conditions and Co-morbidities  Goal: Patient's chronic conditions and co-morbidity symptoms are monitored and maintained or improved  Outcome: Progressing     Problem: Pain  Goal: Verbalizes/displays adequate comfort level or baseline comfort level  Outcome: Progressing     Problem: Safety - Adult  Goal: Free from fall injury  Outcome: Progressing     Problem: Respiratory - Adult  Goal: Achieves optimal ventilation and oxygenation  Outcome: Progressing     Problem: Skin/Tissue Integrity  Goal: Absence of new skin breakdown  Description: 1. Monitor for areas of redness and/or skin breakdown  2. Assess vascular access sites hourly  3. Every 4-6 hours minimum:  Change oxygen saturation probe site  4. Every 4-6 hours:  If on nasal continuous positive airway pressure, respiratory therapy assess nares and determine need for appliance change or resting period.   Outcome: Progressing

## 2023-07-28 NOTE — PLAN OF CARE
Problem: Pain  Goal: Verbalizes/displays adequate comfort level or baseline comfort level  Outcome: Progressing  Flowsheets (Taken 7/28/2023 0800)  Verbalizes/displays adequate comfort level or baseline comfort level:   Encourage patient to monitor pain and request assistance   Assess pain using appropriate pain scale   Administer analgesics based on type and severity of pain and evaluate response   Implement non-pharmacological measures as appropriate and evaluate response     Problem: Safety - Adult  Goal: Free from fall injury  Outcome: Progressing     Problem: Respiratory - Adult  Goal: Achieves optimal ventilation and oxygenation  Outcome: Progressing  Flowsheets (Taken 7/28/2023 0800)  Achieves optimal ventilation and oxygenation:   Assess for changes in respiratory status   Assess for changes in mentation and behavior   Position to facilitate oxygenation and minimize respiratory effort

## 2023-07-28 NOTE — PROCEDURES
INSTRUMENTAL SWALLOW REPORT  MODIFIED BARIUM SWALLOW    NAME: Maricruz Garcia   : 1950  MRN: [id number]       Date of Eval: 2023              Referring Diagnosis(es):      Past Medical History:  has a past medical history of Allergic rhinitis, cause unspecified, Back pain, Bowel obstruction ([address]), C. difficile diarrhea, CAD (coronary artery disease), Cardiac murmur, Cellulitis, Cerebral artery occlusion with cerebral infarction ([address]), COVID-19, Diverticulosis of colon (without mention of hemorrhage), GERD (gastroesophageal reflux disease), History of blood transfusion, History of CHF (congestive heart failure), History of MI (myocardial infarction), History of ovarian cyst, History of peritonitis, HTN (hypertension), Hx of blood clots, Hyperlipidemia, Intestinal or peritoneal adhesions with obstruction (postoperative) (postinfection) ([address]), Kidney infection, Lateral epicondylitis  of elbow, MDRO (multiple drug resistant organisms) resistance, Muscle strain, Other abnormal glucose, PONV (postoperative nausea and vomiting), Pre-diabetes, Restless legs syndrome (RLS), Snores, 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[address]), TIA (transient ischemic attack), Under care of service provider, Under care of service provider, Uses walker, Vitamin D deficiency, Wears glasses, and Wellness examination. Past Surgical History:  has a past surgical history that includes Ovary removal (); colectomy (); Appendectomy (); Ovary removal (); Hysterectomy (); Bunionectomy (Left); sinus surgery (); Colonoscopy; other surgical history (2014); cyst removal (Right); Wrist fracture surgery (Left, 2019); Upper gastrointestinal endoscopy (N/A, 2019); Upper gastrointestinal endoscopy (N/A, 2019); Upper gastrointestinal endoscopy (N/A, 2019); Abdomen surgery (); lumbar fusion (N/A, 02/10/2020); Cardiac catheterization ();  [place] tooth extraction; lumbar fusion

## 2023-07-28 NOTE — PROGRESS NOTES
Daily Progress Note  Neuro Critical Care    Patient Name: Norris Lindquist  Patient : 1950  Room/Bed: 0117/0117-01  Code Status: Full code  Allergies: Allergies   Allergen Reactions    Bactrim [Sulfamethoxazole-Trimethoprim] Other (See Comments)     confusion    Cat Hair Extract Rash    Codeine Itching    Diazepam Other (See Comments)     Unsure of reaction    Meperidine Hcl Other (See Comments)     Unsure of reaction    Seasonal Other (See Comments)     hayfever       CHIEF COMPLAINT:        Neck pain/weakness     INTERVAL HISTORY    Initial Presentation (Admitted 2023): The patient is a 68 y.o. female with a history of CAD on eliquis, CHF, HTN, TIA, who presents following scheduled neurosurgical intervention for cervical spondylosis and thoracic kyphosis. Per medical record patient has been experiencing neck pain and weakness and left upper extremity paresthesias and weakness. Patient has a history of gait instability and frequent falls and has required previous cervical decompression and fusion. Patient presents to the neuro ICU following scheduled anterior cervical discectomy, fusion C6-7, posterior cervical laminectomy C1-2 and cervical fusion C2-T2. Following the procedure decision was made for the patient to remain intubated due to the length and complexity of the surgery. Patient arrives to the neuro ICU intubated on propofol for sedation and received paralytic agent per anesthesia prior to transfer and has limited physical examination on arrival. Neuro critical care was consulted for post operative medical management. Hospital Course:   : No acute events overnight. Tolerated SBT, subsequently extubated. Pain medications adjusted and started on bowel regimen. Passed swallow evaluation. Otherwise remained hemodynamically stable with no change in neurological exam.    Last 24h:   No acute events overnight. Remained hemodynamically stable.   Patient did have an episode of anxiety CARDIOVASCULAR:  normal s1 / s2, RRR, distal pulses intact   ABDOMEN:  Soft, no rigidity   NEUROLOGIC:  Mental Status:  A & O x3,awake             Cranial Nerves:    cranial nerves II-XII are grossly intact    Motor Exam:    Drift:  present -bilateral lower extremities  Tone:  normal    4+/5 right upper extremity, 3/5 left upper extremity  3/5 bilateral lower extremities    Sensory:    Touch:    Right Upper Extremity:  normal  Left Upper Extremity:  normal  Right Lower Extremity:  normal  Left Lower Extremity:  normal    Coordination/Dysmetria:  Heel to Shin:  Right:  normal  Left:  normal  Finger to Nose:   Right:  normal  Left:  normal   Dysdiadochokinesia:  N/A    Gait: Deferred       DRAINS:  [] JULIO drain with 170 cc out in the last 24 hours    ASSESSMENT AND PLAN:       Patient is a 81-year-old femalewith a history of CAD on eliquis, CHF, HTN, TIA, who presents to the neuro ICU following scheduled anterior cervical discectomy, osteotomy, fusion C6-7, posterior cervical osteotomy C6-7, cervical fusion C2-T2  for cervical spondylosis and thoracic kyphosis. NEUROLOGIC:  Cervical spondylolithesis/ stenosis    - POD #2 s/p anterior cervical discectomy, osteotomy, fusion C6-7, posterior cervical osteotomy C6-7, cervical fusion C2-T2   - Neurosurgery primary   - Postop CT C-spine reviewed, C2 screw slightly projecting into spinal canal.  -Dilaudid and Roxicodone for pain control  - Goal SBP normotensive   - Neuro checks per protocol     CARDIOVASCULAR:  History of CAD on eliquis, CHF, HTN  - Normotensive during admission, can restart home antihypertensives later if needed  - Hold eliquis   - Goal SBP normotensive   - Continue telemetry     PULMONARY:  - Extubated on 7/27/2023  - On room air     RENAL/FLUID/ELECTROLYTE:  - BUN 16/ Creatinine 0.8  - IVF: None  - Replace electrolytes PRN  - Daily BMP     GI/NUTRITION:  NUTRITION:  ADULT DIET;  Regular  - Bowel regimen: GlycoLax as needed   - GI prophylaxis: Pepcid

## 2023-07-28 NOTE — PROGRESS NOTES
Occupational Therapy  Facility/Department: [address] NEURO ICU  Occupational Therapy Initial Assessment    Name: Dario Anderson  : 1950  MRN: [id number]  Date of Service: 2023    s/p ACDF C6-C7 and C2-T2 fusion      Discharge Recommendations:  Patient would benefit from continued therapy after discharge  OT Equipment Recommendations  Equipment Needed: No    Further therapy recommended at discharge. The patient should be able to tolerate at least 3 hours of therapy per day over 5 days or 15 hours over 7 days. This patient may benefit from a Physical Medicine and Rehab consult. Patient Diagnosis(es): Diagnoses of Cervical spondylosis and Kyphosis of thoracic region, unspecified kyphosis type were pertinent to this visit. Past Medical History:  has a past medical history of Allergic rhinitis, cause unspecified, Back pain, Bowel obstruction (HCC), C. difficile diarrhea, CAD (coronary artery disease), Cardiac murmur, Cellulitis, Cerebral artery occlusion with cerebral infarction ([address]), COVID-19, Diverticulosis of colon (without mention of hemorrhage), GERD (gastroesophageal reflux disease), History of blood transfusion, History of CHF (congestive heart failure), History of MI (myocardial infarction), History of ovarian cyst, History of peritonitis, HTN (hypertension), Hx of blood clots, Hyperlipidemia, Intestinal or peritoneal adhesions with obstruction (postoperative) (postinfection) ([address]), Kidney infection, Lateral epicondylitis  of elbow, MDRO (multiple drug resistant organisms) resistance, Muscle strain, Other abnormal glucose, PONV (postoperative nausea and vomiting), Pre-diabetes, Restless legs syndrome (RLS), Snores, 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[address]), TIA (transient ischemic attack), Under care of service provider, Under care of service provider, Uses walker, Vitamin D deficiency, Wears glasses, and Wellness examination.   Past Surgical History:  has a past surgical history that includes Ovary removal (1970); colectomy (0349); Appendectomy (1968); Ovary removal (1971); Hysterectomy (1973); Bunionectomy (Left); sinus surgery (2004); Colonoscopy; other surgical history (08/14/2014); cyst removal (Right); Wrist fracture surgery (Left, 03/05/2019); Upper gastrointestinal endoscopy (N/A, 05/31/2019); Upper gastrointestinal endoscopy (N/A, 08/05/2019); Upper gastrointestinal endoscopy (N/A, 08/23/2019); Abdomen surgery (1976); lumbar fusion (N/A, 02/10/2020); Cardiac catheterization (2005); [place] tooth extraction; lumbar fusion (N/A, 06/17/2020); back surgery; cervical fusion (N/A, 05/21/2021); and Finger Closed Reduction (Left, 08/24/2022). Assessment   Performance deficits / Impairments: Decreased functional mobility ; Decreased ADL status; Decreased ROM; Decreased strength;Decreased endurance;Decreased cognition;Decreased balance;Decreased coordination;Decreased fine motor control;Decreased posture;Decreased high-level IADLs  Assessment: Pt will decreased functional use of BUE this date, more notable through LUE which pt states is dominate. Heavy 2 assist needed for transfers this date with increased time and cuing. Pt would benefit from continued therapy to increase functional performance and engagement prior to and following discharge from acute setting.   Prognosis: Good  Decision Making: High Complexity  REQUIRES OT FOLLOW-UP: Yes  Activity Tolerance  Activity Tolerance: Patient limited by fatigue        Plan   Occupational Therapy Plan  Times Per Week: 4-5x/week  Current Treatment Recommendations: Strengthening, ROM, Balance training, Functional mobility training, Pain management, Safety education & training, Self-Care / ADL, Equipment evaluation, education, & procurement, Patient/Caregiver education & training, Coordination training, Cognitive/Perceptual training, Neuromuscular re-education     Restrictions  Restrictions/Precautions  Restrictions/Precautions: Fall

## 2023-07-28 NOTE — PROGRESS NOTES
Person assistance  Sit to Supine:  (retired to bedside chair with sling following mobility)  Scooting: Maximal assistance  Bed Mobility Comments: HOB elevated maximally, verbal cues for sequencing with excellent effort by pt; limited due to significant weakness  Transfers  Sit to Stand: Maximum Assistance;2 Person Assistance  Stand to Sit: Contact guard assistance (pt able to control from natan stedy to recliner)  Bed to Chair: Dependent/Total  Comment: sit to stand performed from bed to natan stedy and within natan stedy; pt required increased time and high reliance on BUE to achieve standing  Ambulation  More Ambulation?: No (unsafe to attempt)  Stairs/Curb  Stairs?: No     Balance  Posture: Fair  Sitting - Static: Fair;-  Sitting - Dynamic: Poor;+  Standing - Static: Poor  Comments: pt sat unsupported ~15 minutes with Min A to maintain due to posterior lean and L lateral lean with fatigue; standing balance assessed with BUE support on natan stedy           AM-PAC Score  AM-PAC Inpatient Mobility Raw Score : 6 (07/28/23 1038)  AM-PAC Inpatient T-Scale Score : 23.55 (07/28/23 1038)  Mobility Inpatient CMS 0-100% Score: 100 (07/28/23 1038)  Mobility Inpatient CMS G-Code Modifier : CN (07/28/23 1038)          Goals  Short Term Goals  Time Frame for Short Term Goals: 14 visits  Short Term Goal 1: Perform bed mobility with Mod A  Short Term Goal 2: Perform functional transfers with Min A  Short Term Goal 3: Ambulate 100ft with appropriate AD and Min A  Short Term Goal 4: Demo Fair- dynamic standing balance to decrease risk of falls       Education  Patient Education  Education Given To: Patient  Education Provided: Role of Therapy;Plan of Care;Transfer Training; Fall Prevention Strategies  Education Method: Verbal  Barriers to Learning: Cognition  Education Outcome: Verbalized understanding;Continued education needed      Therapy Time   Individual Concurrent Group Co-treatment   Time In 0931         Time Out 1008 Minutes 37         Timed Code Treatment Minutes: 10 Minutes; co-eval with OT       Delmy Bishop, PT

## 2023-07-29 ENCOUNTER — APPOINTMENT (OUTPATIENT)
Dept: GENERAL RADIOLOGY | Age: 73
End: 2023-07-29
Attending: NEUROLOGICAL SURGERY
Payer: MEDICARE

## 2023-07-29 LAB
ANION GAP SERPL CALCULATED.3IONS-SCNC: 9 MMOL/L (ref 9–17)
BASOPHILS # BLD: 0.04 K/UL (ref 0–0.2)
BASOPHILS NFR BLD: 1 % (ref 0–2)
BILIRUB UR QL STRIP: NEGATIVE
BUN SERPL-MCNC: 11 MG/DL (ref 8–23)
CALCIUM SERPL-MCNC: 7.3 MG/DL (ref 8.6–10.4)
CASTS #/AREA URNS LPF: NORMAL /LPF (ref 0–8)
CHLORIDE SERPL-SCNC: 117 MMOL/L (ref 98–107)
CLARITY UR: CLEAR
CO2 SERPL-SCNC: 23 MMOL/L (ref 20–31)
COLOR UR: YELLOW
CREAT SERPL-MCNC: 0.6 MG/DL (ref 0.5–0.9)
EOSINOPHIL # BLD: 0.29 K/UL (ref 0–0.44)
EOSINOPHILS RELATIVE PERCENT: 4 % (ref 1–4)
EPI CELLS #/AREA URNS HPF: NORMAL /HPF (ref 0–5)
ERYTHROCYTE [DISTWIDTH] IN BLOOD BY AUTOMATED COUNT: 13.8 % (ref 11.8–14.4)
GFR SERPL CREATININE-BSD FRML MDRD: >60 ML/MIN/1.73M2
GLUCOSE SERPL-MCNC: 105 MG/DL (ref 70–99)
GLUCOSE UR STRIP-MCNC: NEGATIVE MG/DL
HCT VFR BLD AUTO: 27.2 % (ref 36.3–47.1)
HGB BLD-MCNC: 8.5 G/DL (ref 11.9–15.1)
HGB UR QL STRIP.AUTO: NEGATIVE
IMM GRANULOCYTES # BLD AUTO: 0.03 K/UL (ref 0–0.3)
IMM GRANULOCYTES NFR BLD: 0 %
KETONES UR STRIP-MCNC: NEGATIVE MG/DL
LEUKOCYTE ESTERASE UR QL STRIP: NEGATIVE
LYMPHOCYTES NFR BLD: 1.55 K/UL (ref 1.1–3.7)
LYMPHOCYTES RELATIVE PERCENT: 21 % (ref 24–43)
MAGNESIUM SERPL-MCNC: 1.9 MG/DL (ref 1.6–2.6)
MCH RBC QN AUTO: 31.6 PG (ref 25.2–33.5)
MCHC RBC AUTO-ENTMCNC: 31.3 G/DL (ref 28.4–34.8)
MCV RBC AUTO: 101.1 FL (ref 82.6–102.9)
MONOCYTES NFR BLD: 0.8 K/UL (ref 0.1–1.2)
MONOCYTES NFR BLD: 11 % (ref 3–12)
NEUTROPHILS NFR BLD: 63 % (ref 36–65)
NEUTS SEG NFR BLD: 4.67 K/UL (ref 1.5–8.1)
NITRITE UR QL STRIP: NEGATIVE
NRBC BLD-RTO: 0 PER 100 WBC
PH UR STRIP: 5.5 [PH] (ref 5–8)
PLATELET # BLD AUTO: 195 K/UL (ref 138–453)
PMV BLD AUTO: 9 FL (ref 8.1–13.5)
POTASSIUM SERPL-SCNC: 3.4 MMOL/L (ref 3.7–5.3)
PROT UR STRIP-MCNC: NEGATIVE MG/DL
RBC # BLD AUTO: 2.69 M/UL (ref 3.95–5.11)
RBC #/AREA URNS HPF: NORMAL /HPF (ref 0–4)
SODIUM SERPL-SCNC: 149 MMOL/L (ref 135–144)
SP GR UR STRIP: 1.01 (ref 1–1.03)
UROBILINOGEN UR STRIP-ACNC: NORMAL EU/DL (ref 0–1)
WBC #/AREA URNS HPF: NORMAL /HPF (ref 0–5)
WBC OTHER # BLD: 7.4 K/UL (ref 3.5–11.3)

## 2023-07-29 PROCEDURE — 6370000000 HC RX 637 (ALT 250 FOR IP)

## 2023-07-29 PROCEDURE — 92526 ORAL FUNCTION THERAPY: CPT

## 2023-07-29 PROCEDURE — 6370000000 HC RX 637 (ALT 250 FOR IP): Performed by: STUDENT IN AN ORGANIZED HEALTH CARE EDUCATION/TRAINING PROGRAM

## 2023-07-29 PROCEDURE — 6360000002 HC RX W HCPCS: Performed by: NURSE PRACTITIONER

## 2023-07-29 PROCEDURE — 6360000002 HC RX W HCPCS

## 2023-07-29 PROCEDURE — 83735 ASSAY OF MAGNESIUM: CPT

## 2023-07-29 PROCEDURE — 6370000000 HC RX 637 (ALT 250 FOR IP): Performed by: PSYCHIATRY & NEUROLOGY

## 2023-07-29 PROCEDURE — 6370000000 HC RX 637 (ALT 250 FOR IP): Performed by: NURSE PRACTITIONER

## 2023-07-29 PROCEDURE — 85027 COMPLETE CBC AUTOMATED: CPT

## 2023-07-29 PROCEDURE — 2580000003 HC RX 258: Performed by: STUDENT IN AN ORGANIZED HEALTH CARE EDUCATION/TRAINING PROGRAM

## 2023-07-29 PROCEDURE — 71045 X-RAY EXAM CHEST 1 VIEW: CPT

## 2023-07-29 PROCEDURE — 2000000000 HC ICU R&B

## 2023-07-29 PROCEDURE — 81001 URINALYSIS AUTO W/SCOPE: CPT

## 2023-07-29 PROCEDURE — 74018 RADEX ABDOMEN 1 VIEW: CPT

## 2023-07-29 PROCEDURE — 99231 SBSQ HOSP IP/OBS SF/LOW 25: CPT | Performed by: PSYCHIATRY & NEUROLOGY

## 2023-07-29 PROCEDURE — 6360000002 HC RX W HCPCS: Performed by: STUDENT IN AN ORGANIZED HEALTH CARE EDUCATION/TRAINING PROGRAM

## 2023-07-29 PROCEDURE — 87040 BLOOD CULTURE FOR BACTERIA: CPT

## 2023-07-29 PROCEDURE — 36415 COLL VENOUS BLD VENIPUNCTURE: CPT

## 2023-07-29 PROCEDURE — 6360000002 HC RX W HCPCS: Performed by: PSYCHIATRY & NEUROLOGY

## 2023-07-29 PROCEDURE — 80048 BASIC METABOLIC PNL TOTAL CA: CPT

## 2023-07-29 PROCEDURE — 2580000003 HC RX 258

## 2023-07-29 RX ORDER — GABAPENTIN 100 MG/1
100 CAPSULE ORAL 2 TIMES DAILY
Status: DISCONTINUED | OUTPATIENT
Start: 2023-07-29 | End: 2023-07-31

## 2023-07-29 RX ORDER — LANSOPRAZOLE
15 KIT
Status: DISCONTINUED | OUTPATIENT
Start: 2023-07-30 | End: 2023-07-29

## 2023-07-29 RX ORDER — OXYCODONE HYDROCHLORIDE 5 MG/1
10 TABLET ORAL EVERY 4 HOURS PRN
Status: CANCELLED | OUTPATIENT
Start: 2023-07-29

## 2023-07-29 RX ORDER — FUROSEMIDE 10 MG/ML
20 INJECTION INTRAMUSCULAR; INTRAVENOUS ONCE
Status: COMPLETED | OUTPATIENT
Start: 2023-07-29 | End: 2023-07-29

## 2023-07-29 RX ORDER — CARVEDILOL 12.5 MG/1
12.5 TABLET ORAL 2 TIMES DAILY WITH MEALS
Status: DISCONTINUED | OUTPATIENT
Start: 2023-07-29 | End: 2023-07-31

## 2023-07-29 RX ORDER — ALBUTEROL SULFATE 90 UG/1
2 AEROSOL, METERED RESPIRATORY (INHALATION) EVERY 6 HOURS PRN
Status: DISCONTINUED | OUTPATIENT
Start: 2023-07-29 | End: 2023-07-29

## 2023-07-29 RX ORDER — CITALOPRAM 20 MG/1
20 TABLET ORAL DAILY
Status: DISCONTINUED | OUTPATIENT
Start: 2023-07-29 | End: 2023-07-31

## 2023-07-29 RX ORDER — LANSOPRAZOLE 30 MG/1
15 TABLET, ORALLY DISINTEGRATING, DELAYED RELEASE ORAL
Status: DISCONTINUED | OUTPATIENT
Start: 2023-07-30 | End: 2023-07-31

## 2023-07-29 RX ORDER — ACETAMINOPHEN 325 MG/1
650 TABLET ORAL EVERY 4 HOURS PRN
Status: DISCONTINUED | OUTPATIENT
Start: 2023-07-29 | End: 2023-08-04 | Stop reason: HOSPADM

## 2023-07-29 RX ORDER — BUSPIRONE HYDROCHLORIDE 5 MG/1
5 TABLET ORAL 3 TIMES DAILY
Status: DISCONTINUED | OUTPATIENT
Start: 2023-07-29 | End: 2023-07-31

## 2023-07-29 RX ORDER — FUROSEMIDE 40 MG/1
40 TABLET ORAL 2 TIMES DAILY
Status: DISCONTINUED | OUTPATIENT
Start: 2023-07-29 | End: 2023-07-31

## 2023-07-29 RX ORDER — AMOXICILLIN AND CLAVULANATE POTASSIUM 875; 125 MG/1; MG/1
1 TABLET, FILM COATED ORAL EVERY 12 HOURS SCHEDULED
Status: DISCONTINUED | OUTPATIENT
Start: 2023-07-29 | End: 2023-07-31

## 2023-07-29 RX ORDER — POTASSIUM CHLORIDE 7.45 MG/ML
10 INJECTION INTRAVENOUS
Status: COMPLETED | OUTPATIENT
Start: 2023-07-29 | End: 2023-07-29

## 2023-07-29 RX ORDER — ACETAMINOPHEN 650 MG/1
650 SUPPOSITORY RECTAL EVERY 4 HOURS PRN
Status: DISCONTINUED | OUTPATIENT
Start: 2023-07-29 | End: 2023-08-04 | Stop reason: HOSPADM

## 2023-07-29 RX ORDER — ALBUTEROL SULFATE 90 UG/1
2 AEROSOL, METERED RESPIRATORY (INHALATION) EVERY 6 HOURS PRN
Status: DISCONTINUED | OUTPATIENT
Start: 2023-07-29 | End: 2023-08-04 | Stop reason: HOSPADM

## 2023-07-29 RX ORDER — OXYCODONE HYDROCHLORIDE 5 MG/1
5 TABLET ORAL EVERY 4 HOURS PRN
Status: CANCELLED | OUTPATIENT
Start: 2023-07-29

## 2023-07-29 RX ADMIN — HYDROMORPHONE HYDROCHLORIDE 0.5 MG: 1 INJECTION, SOLUTION INTRAMUSCULAR; INTRAVENOUS; SUBCUTANEOUS at 20:20

## 2023-07-29 RX ADMIN — FUROSEMIDE 20 MG: 10 INJECTION, SOLUTION INTRAMUSCULAR; INTRAVENOUS at 09:52

## 2023-07-29 RX ADMIN — POTASSIUM CHLORIDE 10 MEQ: 10 INJECTION, SOLUTION INTRAVENOUS at 11:35

## 2023-07-29 RX ADMIN — HYDROMORPHONE HYDROCHLORIDE 0.5 MG: 1 INJECTION, SOLUTION INTRAMUSCULAR; INTRAVENOUS; SUBCUTANEOUS at 09:08

## 2023-07-29 RX ADMIN — POTASSIUM BICARBONATE 40 MEQ: 782 TABLET, EFFERVESCENT ORAL at 10:58

## 2023-07-29 RX ADMIN — AMOXICILLIN AND CLAVULANATE POTASSIUM 1 TABLET: 875; 125 TABLET, FILM COATED ORAL at 20:19

## 2023-07-29 RX ADMIN — ENOXAPARIN SODIUM 40 MG: 100 INJECTION SUBCUTANEOUS at 08:08

## 2023-07-29 RX ADMIN — SODIUM CHLORIDE, PRESERVATIVE FREE 10 ML: 5 INJECTION INTRAVENOUS at 20:22

## 2023-07-29 RX ADMIN — HYDROXYZINE HYDROCHLORIDE 25 MG: 25 TABLET ORAL at 11:38

## 2023-07-29 RX ADMIN — HYDROMORPHONE HYDROCHLORIDE 0.5 MG: 1 INJECTION, SOLUTION INTRAMUSCULAR; INTRAVENOUS; SUBCUTANEOUS at 02:18

## 2023-07-29 RX ADMIN — FUROSEMIDE 40 MG: 40 TABLET ORAL at 21:38

## 2023-07-29 RX ADMIN — HYDROXYZINE HYDROCHLORIDE 25 MG: 25 TABLET ORAL at 20:19

## 2023-07-29 RX ADMIN — HYDRALAZINE HYDROCHLORIDE 5 MG: 20 INJECTION INTRAMUSCULAR; INTRAVENOUS at 20:19

## 2023-07-29 RX ADMIN — GABAPENTIN 100 MG: 100 CAPSULE ORAL at 11:33

## 2023-07-29 RX ADMIN — SODIUM CHLORIDE, PRESERVATIVE FREE 10 ML: 5 INJECTION INTRAVENOUS at 08:08

## 2023-07-29 RX ADMIN — HYDROMORPHONE HYDROCHLORIDE 0.5 MG: 1 INJECTION, SOLUTION INTRAMUSCULAR; INTRAVENOUS; SUBCUTANEOUS at 18:08

## 2023-07-29 RX ADMIN — CARVEDILOL 12.5 MG: 12.5 TABLET, FILM COATED ORAL at 10:59

## 2023-07-29 RX ADMIN — BUSPIRONE HYDROCHLORIDE 5 MG: 5 TABLET ORAL at 20:19

## 2023-07-29 RX ADMIN — HYDROMORPHONE HYDROCHLORIDE 0.5 MG: 1 INJECTION, SOLUTION INTRAMUSCULAR; INTRAVENOUS; SUBCUTANEOUS at 22:19

## 2023-07-29 RX ADMIN — CITALOPRAM 20 MG: 20 TABLET, FILM COATED ORAL at 11:00

## 2023-07-29 RX ADMIN — POTASSIUM CHLORIDE 10 MEQ: 10 INJECTION, SOLUTION INTRAVENOUS at 13:00

## 2023-07-29 RX ADMIN — BUSPIRONE HYDROCHLORIDE 5 MG: 5 TABLET ORAL at 10:59

## 2023-07-29 RX ADMIN — POLYETHYLENE GLYCOL 3350 17 G: 17 POWDER, FOR SOLUTION ORAL at 08:04

## 2023-07-29 RX ADMIN — SODIUM CHLORIDE, PRESERVATIVE FREE 10 ML: 5 INJECTION INTRAVENOUS at 20:19

## 2023-07-29 RX ADMIN — ACETAMINOPHEN 650 MG: 325 TABLET ORAL at 20:19

## 2023-07-29 RX ADMIN — SENNOSIDES AND DOCUSATE SODIUM 1 TABLET: 50; 8.6 TABLET ORAL at 08:04

## 2023-07-29 RX ADMIN — ACETAMINOPHEN 650 MG: 325 TABLET ORAL at 10:59

## 2023-07-29 RX ADMIN — GABAPENTIN 100 MG: 100 CAPSULE ORAL at 21:37

## 2023-07-29 ASSESSMENT — PAIN DESCRIPTION - DESCRIPTORS
DESCRIPTORS: ACHING

## 2023-07-29 ASSESSMENT — PAIN SCALES - GENERAL
PAINLEVEL_OUTOF10: 4
PAINLEVEL_OUTOF10: 7
PAINLEVEL_OUTOF10: 4
PAINLEVEL_OUTOF10: 7
PAINLEVEL_OUTOF10: 6
PAINLEVEL_OUTOF10: 7

## 2023-07-29 ASSESSMENT — PAIN DESCRIPTION - LOCATION
LOCATION: NECK
LOCATION: NECK;INCISION
LOCATION: NECK
LOCATION: NECK;INCISION

## 2023-07-29 ASSESSMENT — PAIN DESCRIPTION - ORIENTATION: ORIENTATION: OTHER (COMMENT)

## 2023-07-29 NOTE — PROGRESS NOTES
Daily Progress Note  Neuro Critical Care    Patient Name: Douglas Capellan  Patient : 1950  Room/Bed: 0117/0117-01  Code Status: Full  Allergies: Allergies   Allergen Reactions    Bactrim [Sulfamethoxazole-Trimethoprim] Other (See Comments)     confusion    Cat Hair Extract Rash    Codeine Itching    Diazepam Other (See Comments)     Unsure of reaction    Meperidine Hcl Other (See Comments)     Unsure of reaction    Seasonal Other (See Comments)     hayfever       CHIEF COMPLAINT:      Neck pain     INTERVAL HISTORY    Initial Presentation (Admitted 23): The patient is a 68 y.o. female with a history of CAD on eliquis, CHF, HTN, TIA, who presents following scheduled neurosurgical intervention for cervical spondylosis and thoracic kyphosis. Per medical record patient has been experiencing neck pain and weakness and left upper extremity paresthesias and weakness. Patient has a history of gait instability and frequent falls and has required previous cervical decompression and fusion. Patient presents to the neuro ICU following scheduled anterior cervical discectomy, fusion C6-7, posterior cervical laminectomy C1-2 and cervical fusion C2-T2. Following the procedure decision was made for the patient to remain intubated due to the length and complexity of the surgery. Patient arrives to the neuro ICU intubated on propofol for sedation and received paralytic agent per anesthesia prior to transfer and has limited physical examination on arrival. Neuro critical care was consulted for post operative medical management. Hospital Course:   : No acute events overnight. Tolerated SBT, subsequently extubated. Pain medications adjusted and started on bowel regimen. Passed swallow evaluation. Otherwise remained hemodynamically stable with no change in neurological exam.  : JULIO drain with 170 mL out. Last 24h:   No acute events overnight. Febrile overnight, Tmax 38.3.  CXR showing new right INTAKE/OUTPUT:  Intake/Output Summary (Last 24 hours) at 7/29/2023 1346  Last data filed at 7/29/2023 1200  Gross per 24 hour   Intake 541.59 ml   Output 940 ml   Net -398.41 ml       IMAGING:   XR ABDOMEN FOR NG/OG/NE TUBE PLACEMENT   Final Result   Enteric tube should be advanced by 6-8 cm. Radiographic follow-up   recommended to ensure proper placement. XR CHEST (SINGLE VIEW FRONTAL)   Final Result   New right infrahilar airspace opacity which may represent developing   pneumonia in the appropriate clinical setting. Left basilar atelectasis. Radiographic follow-up recommended to document resolution following treatment   in 6-8 weeks. XR CHEST (2 VW)   Final Result   Mild cardiomegaly. Mild left pleural effusion. FL MODIFIED BARIUM SWALLOW W VIDEO   Final Result   1. Deep penetration to the cords with the thin liquid substance by teaspoon. No aspiration. No reflexive coughing. 2. Penetration without aspiration with the nectar thick substance by straw. 3. Deep penetration without aspiration with the nectar thick substance by   teaspoon. 4. Shallow penetration without aspiration with the honey thick substance. 5. No penetration or aspiration with the pureed/pudding thick, soft solid and   cookie solid substances. Please see separate speech pathology report for full discussion of findings   and recommendations. CT CERVICAL SPINE WO CONTRAST   Preliminary Result   Right C2 screw position as described. Other expected recent postoperative changes from C2-T2. FLUORO FOR SURGICAL PROCEDURES   Final Result      FLUORO FOR SURGICAL PROCEDURES   Final Result      FLUORO FOR SURGICAL PROCEDURES   Final Result            Labs and Images reviewed with:  [x] Dr. Luis Otoole  [] Dr. Marin Romano  [] There are no new interval images to review. PHYSICAL EXAM       CONSTITUTIONAL:  Well developed, well nourished, alert and oriented x 3, in no acute distress.  [facility]

## 2023-07-29 NOTE — PROGRESS NOTES
Comprehensive Nutrition Assessment    Type and Reason for Visit:  Initial, Consult (tube feeding order/management)    Nutrition Recommendations/Plan:   Suggest TF of Osmolite 1.2 (Standard w/o fiber) at goal rate of 55ml/hr to provide 1584 kcal/d, 73gm protein  Monitor TF tolerance and f/u speech eval when completed     Malnutrition Assessment:  Malnutrition Status:  Insufficient data (07/29/23 1324)    Context:  Acute Illness     Findings of the 6 clinical characteristics of malnutrition:  Energy Intake:  Unable to assess  Weight Loss:  Unable to assess     Body Fat Loss:  Unable to assess     Muscle Mass Loss:  Unable to assess    Fluid Accumulation:  Unable to assess     Strength:  Not Performed    Nutrition Assessment:    Admitted d/t cervical spondylosis. Speech eval this am, + wet cough-recommend alt means of nutrition with repeat swallow assessment planned for 7/31    Nutrition Related Findings:    labs/meds reviewed Wound Type: None       Current Nutrition Intake & Therapies:    Average Meal Intake: NPO     Diet NPO    Anthropometric Measures:  Height: 5' 2.99\" (160 cm)  Ideal Body Weight (IBW): 115 lbs (52 kg)       Current Body Weight: 200 lb 2.8 oz (90.8 kg), 174.1 % IBW.     Current BMI (kg/m2): 35.5                          BMI Categories: Obese Class 2 (BMI 35.0 -39.9)    Estimated Daily Nutrient Needs:  Energy Requirements Based On: Kcal/kg     Energy (kcal/day): 7913-0883 kcal/d  Weight Used for Protein Requirements: Ideal  Protein (g/day): 60-75gm pro/d  Method Used for Fluid Requirements: 1 ml/kcal  Fluid (ml/day): 1500ml/d    Nutrition Diagnosis:   Inadequate oral intake related to swallowing difficulty as evidenced by nutrition support - enteral nutrition    Nutrition Interventions:   Food and/or Nutrient Delivery: Continue NPO, Start Tube Feeding  Nutrition Education/Counseling: No recommendation at this time  Coordination of Nutrition Care: Continue to monitor while inpatient

## 2023-07-29 NOTE — PLAN OF CARE
Problem: Discharge Planning  Goal: Discharge to home or other facility with appropriate resources  7/29/2023 0421 by Cony Chin RN  Outcome: Progressing  Flowsheets (Taken 7/28/2023 2000 by Herve Ferrer RN)  Discharge to home or other facility with appropriate resources: Arrange for needed discharge resources and transportation as appropriate     Problem: Chronic Conditions and Co-morbidities  Goal: Patient's chronic conditions and co-morbidity symptoms are monitored and maintained or improved  7/29/2023 0421 by Cony Chin RN  Outcome: Progressing  Flowsheets (Taken 7/28/2023 2000 by Herve Ferrer RN)  Care Plan - Patient's Chronic Conditions and Co-Morbidity Symptoms are Monitored and Maintained or Improved: Monitor and assess patient's chronic conditions and comorbid symptoms for stability, deterioration, or improvement     Problem: Pain  Goal: Verbalizes/displays adequate comfort level or baseline comfort level  7/29/2023 1755 by Nnamdi Vlichis RN  Outcome: Progressing  Flowsheets (Taken 7/29/2023 0800)  Verbalizes/displays adequate comfort level or baseline comfort level:   Encourage patient to monitor pain and request assistance   Assess pain using appropriate pain scale   Administer analgesics based on type and severity of pain and evaluate response   Implement non-pharmacological measures as appropriate and evaluate response  7/29/2023 0421 by Cony Chin RN  Outcome: Progressing     Problem: Safety - Adult  Goal: Free from fall injury  7/29/2023 1755 by Nnamdi Vilchis RN  Outcome: Progressing  7/29/2023 0421 by Cony Chin RN  Outcome: Progressing  Flowsheets (Taken 7/28/2023 2000 by Herve Ferrer RN)  Free From Fall Injury: Instruct family/caregiver on patient safety     Problem: Respiratory - Adult  Goal: Achieves optimal ventilation and oxygenation  7/29/2023 1755 by Nnamdi Vilchis RN  Outcome: Progressing  7/29/2023 0421 by Cony Chin RN  Outcome: Progressing     Problem: Skin/Tissue

## 2023-07-29 NOTE — PLAN OF CARE
Problem: Discharge Planning  Goal: Discharge to home or other facility with appropriate resources  Outcome: Progressing  Flowsheets (Taken 7/28/2023 2000 by Ailin Hdz RN)  Discharge to home or other facility with appropriate resources: Arrange for needed discharge resources and transportation as appropriate     Problem: Chronic Conditions and Co-morbidities  Goal: Patient's chronic conditions and co-morbidity symptoms are monitored and maintained or improved  Outcome: Progressing  Flowsheets (Taken 7/28/2023 2000 by Ailin Hdz RN)  Care Plan - Patient's Chronic Conditions and Co-Morbidity Symptoms are Monitored and Maintained or Improved: Monitor and assess patient's chronic conditions and comorbid symptoms for stability, deterioration, or improvement     Problem: Pain  Goal: Verbalizes/displays adequate comfort level or baseline comfort level  7/29/2023 0421 by Richmond Downing RN  Outcome: Progressing     Problem: Safety - Adult  Goal: Free from fall injury  7/29/2023 0421 by Richmond Downing RN  Outcome: Progressing  Flowsheets (Taken 7/28/2023 2000 by Ailin Hdz RN)  Free From Fall Injury: Instruct family/caregiver on patient safety     Problem: Respiratory - Adult  Goal: Achieves optimal ventilation and oxygenation  7/29/2023 0421 by Richmond Downing RN  Outcome: Progressing     Problem: Skin/Tissue Integrity  Goal: Absence of new skin breakdown  Description: 1. Monitor for areas of redness and/or skin breakdown  2. Assess vascular access sites hourly  3. Every 4-6 hours minimum:  Change oxygen saturation probe site  4. Every 4-6 hours:  If on nasal continuous positive airway pressure, respiratory therapy assess nares and determine need for appliance change or resting period.   Outcome: Progressing

## 2023-07-30 ENCOUNTER — APPOINTMENT (OUTPATIENT)
Dept: MRI IMAGING | Age: 73
End: 2023-07-30
Attending: NEUROLOGICAL SURGERY
Payer: MEDICARE

## 2023-07-30 LAB
ANION GAP SERPL CALCULATED.3IONS-SCNC: 10 MMOL/L (ref 9–17)
BASOPHILS # BLD: 0.04 K/UL (ref 0–0.2)
BASOPHILS NFR BLD: 1 % (ref 0–2)
BUN SERPL-MCNC: 10 MG/DL (ref 8–23)
CALCIUM SERPL-MCNC: 7.9 MG/DL (ref 8.6–10.4)
CHLORIDE SERPL-SCNC: 111 MMOL/L (ref 98–107)
CO2 SERPL-SCNC: 23 MMOL/L (ref 20–31)
CREAT SERPL-MCNC: 0.5 MG/DL (ref 0.5–0.9)
EOSINOPHIL # BLD: 0.43 K/UL (ref 0–0.44)
EOSINOPHILS RELATIVE PERCENT: 6 % (ref 1–4)
ERYTHROCYTE [DISTWIDTH] IN BLOOD BY AUTOMATED COUNT: 13.4 % (ref 11.8–14.4)
GFR SERPL CREATININE-BSD FRML MDRD: >60 ML/MIN/1.73M2
GLUCOSE SERPL-MCNC: 124 MG/DL (ref 70–99)
HCT VFR BLD AUTO: 31 % (ref 36.3–47.1)
HGB BLD-MCNC: 10.2 G/DL (ref 11.9–15.1)
IMM GRANULOCYTES # BLD AUTO: <0.03 K/UL (ref 0–0.3)
IMM GRANULOCYTES NFR BLD: 0 %
LYMPHOCYTES NFR BLD: 1.55 K/UL (ref 1.1–3.7)
LYMPHOCYTES RELATIVE PERCENT: 22 % (ref 24–43)
MCH RBC QN AUTO: 32.1 PG (ref 25.2–33.5)
MCHC RBC AUTO-ENTMCNC: 32.9 G/DL (ref 28.4–34.8)
MCV RBC AUTO: 97.5 FL (ref 82.6–102.9)
MICROORGANISM SPEC CULT: NORMAL
MONOCYTES NFR BLD: 0.69 K/UL (ref 0.1–1.2)
MONOCYTES NFR BLD: 10 % (ref 3–12)
NEUTROPHILS NFR BLD: 61 % (ref 36–65)
NEUTS SEG NFR BLD: 4.28 K/UL (ref 1.5–8.1)
NRBC BLD-RTO: 0 PER 100 WBC
PLATELET # BLD AUTO: 200 K/UL (ref 138–453)
PMV BLD AUTO: 9.3 FL (ref 8.1–13.5)
POTASSIUM SERPL-SCNC: 4.4 MMOL/L (ref 3.7–5.3)
RBC # BLD AUTO: 3.18 M/UL (ref 3.95–5.11)
SERVICE CMNT-IMP: NORMAL
SODIUM SERPL-SCNC: 144 MMOL/L (ref 135–144)
SPECIMEN DESCRIPTION: NORMAL
WBC OTHER # BLD: 7 K/UL (ref 3.5–11.3)

## 2023-07-30 PROCEDURE — 6370000000 HC RX 637 (ALT 250 FOR IP): Performed by: NURSE PRACTITIONER

## 2023-07-30 PROCEDURE — 6370000000 HC RX 637 (ALT 250 FOR IP): Performed by: NEUROLOGICAL SURGERY

## 2023-07-30 PROCEDURE — 70551 MRI BRAIN STEM W/O DYE: CPT

## 2023-07-30 PROCEDURE — 99231 SBSQ HOSP IP/OBS SF/LOW 25: CPT | Performed by: PSYCHIATRY & NEUROLOGY

## 2023-07-30 PROCEDURE — 36415 COLL VENOUS BLD VENIPUNCTURE: CPT

## 2023-07-30 PROCEDURE — 6370000000 HC RX 637 (ALT 250 FOR IP): Performed by: PSYCHIATRY & NEUROLOGY

## 2023-07-30 PROCEDURE — 6360000002 HC RX W HCPCS

## 2023-07-30 PROCEDURE — 2580000003 HC RX 258

## 2023-07-30 PROCEDURE — 2060000000 HC ICU INTERMEDIATE R&B

## 2023-07-30 PROCEDURE — 94761 N-INVAS EAR/PLS OXIMETRY MLT: CPT

## 2023-07-30 PROCEDURE — 85027 COMPLETE CBC AUTOMATED: CPT

## 2023-07-30 PROCEDURE — 2580000003 HC RX 258: Performed by: STUDENT IN AN ORGANIZED HEALTH CARE EDUCATION/TRAINING PROGRAM

## 2023-07-30 PROCEDURE — 6370000000 HC RX 637 (ALT 250 FOR IP)

## 2023-07-30 PROCEDURE — 2580000003 HC RX 258: Performed by: NURSE PRACTITIONER

## 2023-07-30 PROCEDURE — 2700000000 HC OXYGEN THERAPY PER DAY

## 2023-07-30 PROCEDURE — 72141 MRI NECK SPINE W/O DYE: CPT

## 2023-07-30 PROCEDURE — 6360000002 HC RX W HCPCS: Performed by: STUDENT IN AN ORGANIZED HEALTH CARE EDUCATION/TRAINING PROGRAM

## 2023-07-30 PROCEDURE — 80048 BASIC METABOLIC PNL TOTAL CA: CPT

## 2023-07-30 RX ADMIN — AMOXICILLIN AND CLAVULANATE POTASSIUM 1 TABLET: 875; 125 TABLET, FILM COATED ORAL at 21:57

## 2023-07-30 RX ADMIN — BUSPIRONE HYDROCHLORIDE 5 MG: 5 TABLET ORAL at 08:08

## 2023-07-30 RX ADMIN — GABAPENTIN 100 MG: 100 CAPSULE ORAL at 21:57

## 2023-07-30 RX ADMIN — BUSPIRONE HYDROCHLORIDE 5 MG: 5 TABLET ORAL at 21:56

## 2023-07-30 RX ADMIN — HYDROMORPHONE HYDROCHLORIDE 0.5 MG: 1 INJECTION, SOLUTION INTRAMUSCULAR; INTRAVENOUS; SUBCUTANEOUS at 00:24

## 2023-07-30 RX ADMIN — FUROSEMIDE 40 MG: 40 TABLET ORAL at 21:56

## 2023-07-30 RX ADMIN — HYDROMORPHONE HYDROCHLORIDE 1 MG: 1 INJECTION, SOLUTION INTRAMUSCULAR; INTRAVENOUS; SUBCUTANEOUS at 11:59

## 2023-07-30 RX ADMIN — HYPROMELLOSE: 0 GEL OPHTHALMIC at 21:25

## 2023-07-30 RX ADMIN — AMOXICILLIN AND CLAVULANATE POTASSIUM 1 TABLET: 875; 125 TABLET, FILM COATED ORAL at 08:08

## 2023-07-30 RX ADMIN — SODIUM CHLORIDE, PRESERVATIVE FREE 10 ML: 5 INJECTION INTRAVENOUS at 08:08

## 2023-07-30 RX ADMIN — HYDRALAZINE HYDROCHLORIDE 5 MG: 20 INJECTION INTRAMUSCULAR; INTRAVENOUS at 22:07

## 2023-07-30 RX ADMIN — HYDRALAZINE HYDROCHLORIDE 5 MG: 20 INJECTION INTRAMUSCULAR; INTRAVENOUS at 00:03

## 2023-07-30 RX ADMIN — BUSPIRONE HYDROCHLORIDE 5 MG: 5 TABLET ORAL at 13:44

## 2023-07-30 RX ADMIN — CARVEDILOL 12.5 MG: 12.5 TABLET, FILM COATED ORAL at 17:41

## 2023-07-30 RX ADMIN — SODIUM CHLORIDE, PRESERVATIVE FREE 5 ML: 5 INJECTION INTRAVENOUS at 21:58

## 2023-07-30 RX ADMIN — HYDROMORPHONE HYDROCHLORIDE 1 MG: 1 INJECTION, SOLUTION INTRAMUSCULAR; INTRAVENOUS; SUBCUTANEOUS at 21:26

## 2023-07-30 RX ADMIN — SENNOSIDES AND DOCUSATE SODIUM 1 TABLET: 50; 8.6 TABLET ORAL at 21:57

## 2023-07-30 RX ADMIN — HYDROMORPHONE HYDROCHLORIDE 1 MG: 1 INJECTION, SOLUTION INTRAMUSCULAR; INTRAVENOUS; SUBCUTANEOUS at 06:02

## 2023-07-30 RX ADMIN — SODIUM CHLORIDE: 9 INJECTION, SOLUTION INTRAVENOUS at 02:07

## 2023-07-30 RX ADMIN — SENNOSIDES AND DOCUSATE SODIUM 1 TABLET: 50; 8.6 TABLET ORAL at 08:08

## 2023-07-30 RX ADMIN — HYDROXYZINE HYDROCHLORIDE 25 MG: 25 TABLET ORAL at 21:57

## 2023-07-30 RX ADMIN — ACETAMINOPHEN 650 MG: 325 TABLET ORAL at 21:57

## 2023-07-30 RX ADMIN — CITALOPRAM 20 MG: 20 TABLET, FILM COATED ORAL at 08:08

## 2023-07-30 RX ADMIN — HYDROXYZINE HYDROCHLORIDE 25 MG: 25 TABLET ORAL at 04:20

## 2023-07-30 RX ADMIN — LANSOPRAZOLE 15 MG: 30 TABLET, ORALLY DISINTEGRATING, DELAYED RELEASE ORAL at 08:10

## 2023-07-30 RX ADMIN — ENOXAPARIN SODIUM 40 MG: 100 INJECTION SUBCUTANEOUS at 08:08

## 2023-07-30 RX ADMIN — CARVEDILOL 12.5 MG: 12.5 TABLET, FILM COATED ORAL at 08:08

## 2023-07-30 RX ADMIN — POLYETHYLENE GLYCOL 3350 17 G: 17 POWDER, FOR SOLUTION ORAL at 08:08

## 2023-07-30 RX ADMIN — ACETAMINOPHEN 650 MG: 325 TABLET ORAL at 02:35

## 2023-07-30 RX ADMIN — SODIUM CHLORIDE, PRESERVATIVE FREE 5 ML: 5 INJECTION INTRAVENOUS at 21:57

## 2023-07-30 RX ADMIN — FUROSEMIDE 40 MG: 40 TABLET ORAL at 08:08

## 2023-07-30 RX ADMIN — HYDROMORPHONE HYDROCHLORIDE 1 MG: 1 INJECTION, SOLUTION INTRAMUSCULAR; INTRAVENOUS; SUBCUTANEOUS at 02:35

## 2023-07-30 RX ADMIN — GABAPENTIN 100 MG: 100 CAPSULE ORAL at 08:08

## 2023-07-30 ASSESSMENT — PAIN SCALES - GENERAL
PAINLEVEL_OUTOF10: 7
PAINLEVEL_OUTOF10: 6
PAINLEVEL_OUTOF10: 7
PAINLEVEL_OUTOF10: 7

## 2023-07-30 ASSESSMENT — PAIN DESCRIPTION - DESCRIPTORS
DESCRIPTORS: ACHING

## 2023-07-30 ASSESSMENT — PAIN DESCRIPTION - LOCATION
LOCATION: NECK
LOCATION: NECK;INCISION
LOCATION: NECK

## 2023-07-30 NOTE — PROGRESS NOTES
Daily Progress Note  Neuro Critical Care    Patient Name: Lacy Lott  Patient : 1950  Room/Bed: 0117/0117-01  Code Status: Full  Allergies: Allergies   Allergen Reactions    Bactrim [Sulfamethoxazole-Trimethoprim] Other (See Comments)     confusion    Cat Hair Extract Rash    Codeine Itching    Diazepam Other (See Comments)     Unsure of reaction    Meperidine Hcl Other (See Comments)     Unsure of reaction    Seasonal Other (See Comments)     hayfever       CHIEF COMPLAINT:      Neck pain     INTERVAL HISTORY    Initial Presentation (Admitted 23): The patient is a 68 y.o. female with a history of CAD on eliquis, CHF, HTN, TIA, who presents following scheduled neurosurgical intervention for cervical spondylosis and thoracic kyphosis. Per medical record patient has been experiencing neck pain and weakness and left upper extremity paresthesias and weakness. Patient has a history of gait instability and frequent falls and has required previous cervical decompression and fusion. Patient presents to the neuro ICU following scheduled anterior cervical discectomy, fusion C6-7, posterior cervical laminectomy C1-2 and cervical fusion C2-T2. Following the procedure decision was made for the patient to remain intubated due to the length and complexity of the surgery. Patient arrives to the neuro ICU intubated on propofol for sedation and received paralytic agent per anesthesia prior to transfer and has limited physical examination on arrival. Neuro critical care was consulted for post operative medical management. Hospital Course:   : No acute events overnight. Tolerated SBT, subsequently extubated. Pain medications adjusted and started on bowel regimen. Passed swallow evaluation. Otherwise remained hemodynamically stable with no change in neurological exam.  : JULIO drain with 170 mL out.   : No acute events overnight. Febrile overnight, Tmax 38.3.  CXR showing new right infrahilar

## 2023-07-30 NOTE — PLAN OF CARE
Problem: Discharge Planning  Goal: Discharge to home or other facility with appropriate resources  Outcome: Progressing  Flowsheets (Taken 7/29/2023 2000)  Discharge to home or other facility with appropriate resources:   Identify barriers to discharge with patient and caregiver   Arrange for needed discharge resources and transportation as appropriate   Identify discharge learning needs (meds, wound care, etc)   Refer to discharge planning if patient needs post-hospital services based on physician order or complex needs related to functional status, cognitive ability or social support system     Problem: Chronic Conditions and Co-morbidities  Goal: Patient's chronic conditions and co-morbidity symptoms are monitored and maintained or improved  Outcome: Progressing  Flowsheets (Taken 7/29/2023 2000)  Care Plan - Patient's Chronic Conditions and Co-Morbidity Symptoms are Monitored and Maintained or Improved:   Monitor and assess patient's chronic conditions and comorbid symptoms for stability, deterioration, or improvement   Collaborate with multidisciplinary team to address chronic and comorbid conditions and prevent exacerbation or deterioration   Update acute care plan with appropriate goals if chronic or comorbid symptoms are exacerbated and prevent overall improvement and discharge     Problem: Pain  Goal: Verbalizes/displays adequate comfort level or baseline comfort level  7/30/2023 0640 by Edna Fabian RN  Outcome: Progressing     Problem: Safety - Adult  Goal: Free from fall injury  7/30/2023 0640 by Edna Fabian RN  Outcome: Progressing     Problem: Respiratory - Adult  Goal: Achieves optimal ventilation and oxygenation  7/30/2023 0640 by Edna Fabian RN  Outcome: Progressing  Flowsheets (Taken 7/29/2023 2000)  Achieves optimal ventilation and oxygenation:   Assess for changes in respiratory status   Assess for changes in mentation and behavior   Position to facilitate oxygenation and minimize

## 2023-07-30 NOTE — PLAN OF CARE
Problem: Discharge Planning  Goal: Discharge to home or other facility with appropriate resources  7/30/2023 0640 by Arvind Rutherford RN  Outcome: Progressing  Flowsheets (Taken 7/29/2023 2000)  Discharge to home or other facility with appropriate resources:   Identify barriers to discharge with patient and caregiver   Arrange for needed discharge resources and transportation as appropriate   Identify discharge learning needs (meds, wound care, etc)   Refer to discharge planning if patient needs post-hospital services based on physician order or complex needs related to functional status, cognitive ability or social support system     Problem: Chronic Conditions and Co-morbidities  Goal: Patient's chronic conditions and co-morbidity symptoms are monitored and maintained or improved  7/30/2023 0640 by Arvind Rutherford RN  Outcome: Progressing  Flowsheets (Taken 7/29/2023 2000)  Care Plan - Patient's Chronic Conditions and Co-Morbidity Symptoms are Monitored and Maintained or Improved:   Monitor and assess patient's chronic conditions and comorbid symptoms for stability, deterioration, or improvement   Collaborate with multidisciplinary team to address chronic and comorbid conditions and prevent exacerbation or deterioration   Update acute care plan with appropriate goals if chronic or comorbid symptoms are exacerbated and prevent overall improvement and discharge     Problem: Pain  Goal: Verbalizes/displays adequate comfort level or baseline comfort level  7/30/2023 1621 by Jayashree Rodrigues RN  Outcome: Progressing  7/30/2023 0640 by Arvind Rutherford RN  Outcome: Progressing     Problem: Safety - Adult  Goal: Free from fall injury  7/30/2023 1621 by Jayashree Rodrigues RN  Outcome: Progressing  7/30/2023 0640 by Arvind Rutherford RN  Outcome: Progressing     Problem: Respiratory - Adult  Goal: Achieves optimal ventilation and oxygenation  7/30/2023 1621 by Jayashree Rodrigues RN  Outcome: Progressing  7/30/2023 0640 by Brennen Scott Ronn Lambert RN  Outcome: Progressing  Flowsheets (Taken 7/29/2023 2000)  Achieves optimal ventilation and oxygenation:   Assess for changes in respiratory status   Assess for changes in mentation and behavior   Position to facilitate oxygenation and minimize respiratory effort   Oxygen supplementation based on oxygen saturation or arterial blood gases   Initiate smoking cessation protocol as indicated   Encourage broncho-pulmonary hygiene including cough, deep breathe, incentive spirometry   Assess the need for suctioning and aspirate as needed   Assess and instruct to report shortness of breath or any respiratory difficulty   Respiratory therapy support as indicated     Problem: Skin/Tissue Integrity  Goal: Absence of new skin breakdown  Description: 1. Monitor for areas of redness and/or skin breakdown  2. Assess vascular access sites hourly  3. Every 4-6 hours minimum:  Change oxygen saturation probe site  4. Every 4-6 hours:  If on nasal continuous positive airway pressure, respiratory therapy assess nares and determine need for appliance change or resting period.   7/30/2023 1621 by Hudson Felipe RN  Outcome: Progressing  7/30/2023 0640 by Oscar Ahumada RN  Outcome: Progressing     Problem: Nutrition Deficit:  Goal: Optimize nutritional status  7/30/2023 1621 by Hudson Felipe RN  Outcome: Progressing  7/30/2023 0640 by Oscar Ahumada RN  Outcome: Progressing

## 2023-07-31 LAB
ANION GAP SERPL CALCULATED.3IONS-SCNC: 8 MMOL/L (ref 9–17)
BASOPHILS # BLD: 0.03 K/UL (ref 0–0.2)
BASOPHILS NFR BLD: 1 % (ref 0–2)
BUN SERPL-MCNC: 14 MG/DL (ref 8–23)
CALCIUM SERPL-MCNC: 8.2 MG/DL (ref 8.6–10.4)
CHLORIDE SERPL-SCNC: 107 MMOL/L (ref 98–107)
CO2 SERPL-SCNC: 27 MMOL/L (ref 20–31)
CREAT SERPL-MCNC: 0.5 MG/DL (ref 0.5–0.9)
EOSINOPHIL # BLD: 0.59 K/UL (ref 0–0.44)
EOSINOPHILS RELATIVE PERCENT: 10 % (ref 1–4)
ERYTHROCYTE [DISTWIDTH] IN BLOOD BY AUTOMATED COUNT: 13.6 % (ref 11.8–14.4)
GFR SERPL CREATININE-BSD FRML MDRD: >60 ML/MIN/1.73M2
GLUCOSE SERPL-MCNC: 135 MG/DL (ref 70–99)
HCT VFR BLD AUTO: 26 % (ref 36.3–47.1)
HGB BLD-MCNC: 8.8 G/DL (ref 11.9–15.1)
IMM GRANULOCYTES # BLD AUTO: 0.03 K/UL (ref 0–0.3)
IMM GRANULOCYTES NFR BLD: 1 %
LYMPHOCYTES NFR BLD: 1.19 K/UL (ref 1.1–3.7)
LYMPHOCYTES RELATIVE PERCENT: 21 % (ref 24–43)
MCH RBC QN AUTO: 34.1 PG (ref 25.2–33.5)
MCHC RBC AUTO-ENTMCNC: 33.8 G/DL (ref 28.4–34.8)
MCV RBC AUTO: 100.8 FL (ref 82.6–102.9)
MONOCYTES NFR BLD: 0.63 K/UL (ref 0.1–1.2)
MONOCYTES NFR BLD: 11 % (ref 3–12)
NEUTROPHILS NFR BLD: 58 % (ref 36–65)
NEUTS SEG NFR BLD: 3.34 K/UL (ref 1.5–8.1)
NRBC BLD-RTO: 0 PER 100 WBC
PLATELET # BLD AUTO: 382 K/UL (ref 138–453)
PMV BLD AUTO: 10.6 FL (ref 8.1–13.5)
POTASSIUM SERPL-SCNC: 3.9 MMOL/L (ref 3.7–5.3)
RBC # BLD AUTO: 2.58 M/UL (ref 3.95–5.11)
SODIUM SERPL-SCNC: 142 MMOL/L (ref 135–144)
WBC OTHER # BLD: 5.8 K/UL (ref 3.5–11.3)

## 2023-07-31 PROCEDURE — 6360000002 HC RX W HCPCS

## 2023-07-31 PROCEDURE — 6370000000 HC RX 637 (ALT 250 FOR IP)

## 2023-07-31 PROCEDURE — 80048 BASIC METABOLIC PNL TOTAL CA: CPT

## 2023-07-31 PROCEDURE — 99231 SBSQ HOSP IP/OBS SF/LOW 25: CPT | Performed by: PSYCHIATRY & NEUROLOGY

## 2023-07-31 PROCEDURE — 97110 THERAPEUTIC EXERCISES: CPT

## 2023-07-31 PROCEDURE — 6360000002 HC RX W HCPCS: Performed by: STUDENT IN AN ORGANIZED HEALTH CARE EDUCATION/TRAINING PROGRAM

## 2023-07-31 PROCEDURE — 92526 ORAL FUNCTION THERAPY: CPT

## 2023-07-31 PROCEDURE — 6370000000 HC RX 637 (ALT 250 FOR IP): Performed by: NURSE PRACTITIONER

## 2023-07-31 PROCEDURE — 85025 COMPLETE CBC W/AUTO DIFF WBC: CPT

## 2023-07-31 PROCEDURE — 2580000003 HC RX 258

## 2023-07-31 PROCEDURE — APPSS30 APP SPLIT SHARED TIME 16-30 MINUTES: Performed by: PHYSICIAN ASSISTANT

## 2023-07-31 PROCEDURE — 97535 SELF CARE MNGMENT TRAINING: CPT

## 2023-07-31 PROCEDURE — 36415 COLL VENOUS BLD VENIPUNCTURE: CPT

## 2023-07-31 PROCEDURE — 2580000003 HC RX 258: Performed by: NURSE PRACTITIONER

## 2023-07-31 PROCEDURE — 2060000000 HC ICU INTERMEDIATE R&B

## 2023-07-31 PROCEDURE — 2580000003 HC RX 258: Performed by: STUDENT IN AN ORGANIZED HEALTH CARE EDUCATION/TRAINING PROGRAM

## 2023-07-31 PROCEDURE — 6370000000 HC RX 637 (ALT 250 FOR IP): Performed by: NEUROLOGICAL SURGERY

## 2023-07-31 PROCEDURE — 94761 N-INVAS EAR/PLS OXIMETRY MLT: CPT

## 2023-07-31 PROCEDURE — 6360000002 HC RX W HCPCS: Performed by: NURSE PRACTITIONER

## 2023-07-31 RX ORDER — OXYCODONE HYDROCHLORIDE 5 MG/1
5 TABLET ORAL EVERY 4 HOURS PRN
Status: DISCONTINUED | OUTPATIENT
Start: 2023-07-31 | End: 2023-08-01

## 2023-07-31 RX ORDER — CARVEDILOL 12.5 MG/1
12.5 TABLET ORAL 2 TIMES DAILY WITH MEALS
Status: DISCONTINUED | OUTPATIENT
Start: 2023-07-31 | End: 2023-08-01

## 2023-07-31 RX ORDER — AMOXICILLIN AND CLAVULANATE POTASSIUM 875; 125 MG/1; MG/1
1 TABLET, FILM COATED ORAL EVERY 12 HOURS SCHEDULED
Status: DISCONTINUED | OUTPATIENT
Start: 2023-07-31 | End: 2023-08-01

## 2023-07-31 RX ORDER — SENNOSIDES 8.8 MG/5ML
5 LIQUID ORAL NIGHTLY
Status: DISCONTINUED | OUTPATIENT
Start: 2023-07-31 | End: 2023-08-01

## 2023-07-31 RX ORDER — CITALOPRAM 20 MG/1
20 TABLET ORAL DAILY
Status: DISCONTINUED | OUTPATIENT
Start: 2023-07-31 | End: 2023-08-01

## 2023-07-31 RX ORDER — LANSOPRAZOLE 30 MG/1
15 TABLET, ORALLY DISINTEGRATING, DELAYED RELEASE ORAL
Status: DISCONTINUED | OUTPATIENT
Start: 2023-08-01 | End: 2023-08-01

## 2023-07-31 RX ORDER — GABAPENTIN 100 MG/1
100 CAPSULE ORAL 2 TIMES DAILY
Status: DISCONTINUED | OUTPATIENT
Start: 2023-07-31 | End: 2023-08-01

## 2023-07-31 RX ORDER — FUROSEMIDE 40 MG/1
40 TABLET ORAL 2 TIMES DAILY
Status: DISCONTINUED | OUTPATIENT
Start: 2023-07-31 | End: 2023-08-01

## 2023-07-31 RX ORDER — BUSPIRONE HYDROCHLORIDE 5 MG/1
5 TABLET ORAL 3 TIMES DAILY
Status: DISCONTINUED | OUTPATIENT
Start: 2023-07-31 | End: 2023-08-01

## 2023-07-31 RX ORDER — POLYETHYLENE GLYCOL 3350 17 G/17G
17 POWDER, FOR SOLUTION ORAL DAILY
Status: DISCONTINUED | OUTPATIENT
Start: 2023-07-31 | End: 2023-08-01

## 2023-07-31 RX ORDER — OXYCODONE HYDROCHLORIDE 5 MG/1
10 TABLET ORAL EVERY 4 HOURS PRN
Status: DISCONTINUED | OUTPATIENT
Start: 2023-07-31 | End: 2023-08-01

## 2023-07-31 RX ADMIN — BUSPIRONE HYDROCHLORIDE 5 MG: 5 TABLET ORAL at 09:43

## 2023-07-31 RX ADMIN — SODIUM CHLORIDE, PRESERVATIVE FREE 10 ML: 5 INJECTION INTRAVENOUS at 20:23

## 2023-07-31 RX ADMIN — HYDROXYZINE HYDROCHLORIDE 25 MG: 25 TABLET ORAL at 15:47

## 2023-07-31 RX ADMIN — ACETAMINOPHEN 650 MG: 325 TABLET ORAL at 02:08

## 2023-07-31 RX ADMIN — FUROSEMIDE 40 MG: 40 TABLET ORAL at 20:24

## 2023-07-31 RX ADMIN — BUSPIRONE HYDROCHLORIDE 5 MG: 5 TABLET ORAL at 15:46

## 2023-07-31 RX ADMIN — CARVEDILOL 12.5 MG: 12.5 TABLET, FILM COATED ORAL at 15:46

## 2023-07-31 RX ADMIN — SODIUM CHLORIDE: 9 INJECTION, SOLUTION INTRAVENOUS at 00:07

## 2023-07-31 RX ADMIN — BUSPIRONE HYDROCHLORIDE 5 MG: 5 TABLET ORAL at 20:24

## 2023-07-31 RX ADMIN — GABAPENTIN 100 MG: 100 CAPSULE ORAL at 09:59

## 2023-07-31 RX ADMIN — GABAPENTIN 100 MG: 100 CAPSULE ORAL at 20:23

## 2023-07-31 RX ADMIN — HYDROMORPHONE HYDROCHLORIDE 1 MG: 1 INJECTION, SOLUTION INTRAMUSCULAR; INTRAVENOUS; SUBCUTANEOUS at 05:23

## 2023-07-31 RX ADMIN — ENOXAPARIN SODIUM 40 MG: 100 INJECTION SUBCUTANEOUS at 09:49

## 2023-07-31 RX ADMIN — LANSOPRAZOLE 15 MG: 30 TABLET, ORALLY DISINTEGRATING, DELAYED RELEASE ORAL at 05:22

## 2023-07-31 RX ADMIN — OXYCODONE HYDROCHLORIDE 10 MG: 5 TABLET ORAL at 15:45

## 2023-07-31 RX ADMIN — CITALOPRAM 20 MG: 20 TABLET, FILM COATED ORAL at 09:43

## 2023-07-31 RX ADMIN — SODIUM CHLORIDE, PRESERVATIVE FREE 10 ML: 5 INJECTION INTRAVENOUS at 09:49

## 2023-07-31 RX ADMIN — OXYCODONE HYDROCHLORIDE 10 MG: 5 TABLET ORAL at 20:24

## 2023-07-31 RX ADMIN — FUROSEMIDE 40 MG: 40 TABLET ORAL at 09:43

## 2023-07-31 RX ADMIN — ACETAMINOPHEN 650 MG: 325 TABLET ORAL at 20:25

## 2023-07-31 RX ADMIN — HYDROMORPHONE HYDROCHLORIDE 1 MG: 1 INJECTION, SOLUTION INTRAMUSCULAR; INTRAVENOUS; SUBCUTANEOUS at 02:08

## 2023-07-31 RX ADMIN — HYDROXYZINE HYDROCHLORIDE 25 MG: 25 TABLET ORAL at 05:23

## 2023-07-31 RX ADMIN — SENNOSIDES 8.8 MG: 8.8 LIQUID ORAL at 20:23

## 2023-07-31 RX ADMIN — OXYCODONE HYDROCHLORIDE 10 MG: 5 TABLET ORAL at 09:49

## 2023-07-31 RX ADMIN — AMOXICILLIN AND CLAVULANATE POTASSIUM 1 TABLET: 875; 125 TABLET, FILM COATED ORAL at 20:24

## 2023-07-31 RX ADMIN — HYDROMORPHONE HYDROCHLORIDE 0.5 MG: 1 INJECTION, SOLUTION INTRAMUSCULAR; INTRAVENOUS; SUBCUTANEOUS at 22:10

## 2023-07-31 RX ADMIN — AMOXICILLIN AND CLAVULANATE POTASSIUM 1 TABLET: 875; 125 TABLET, FILM COATED ORAL at 09:43

## 2023-07-31 ASSESSMENT — PAIN DESCRIPTION - DESCRIPTORS
DESCRIPTORS: ACHING
DESCRIPTORS: ACHING

## 2023-07-31 ASSESSMENT — PAIN DESCRIPTION - LOCATION
LOCATION: NECK
LOCATION: GENERALIZED
LOCATION: NECK

## 2023-07-31 ASSESSMENT — PAIN SCALES - GENERAL
PAINLEVEL_OUTOF10: 7
PAINLEVEL_OUTOF10: 8
PAINLEVEL_OUTOF10: 7
PAINLEVEL_OUTOF10: 8

## 2023-07-31 NOTE — PLAN OF CARE
Problem: Discharge Planning  Goal: Discharge to home or other facility with appropriate resources  7/31/2023 1656 by Rosa Maria Quispe RN  Outcome: Progressing  Flowsheets (Taken 7/31/2023 0800)  Discharge to home or other facility with appropriate resources:   Identify barriers to discharge with patient and caregiver   Arrange for needed discharge resources and transportation as appropriate   Identify discharge learning needs (meds, wound care, etc)  7/31/2023 0635 by Eriberto Ramirez RN  Outcome: Progressing  Flowsheets (Taken 7/30/2023 2100)  Discharge to home or other facility with appropriate resources:   Identify barriers to discharge with patient and caregiver   Arrange for needed discharge resources and transportation as appropriate   Identify discharge learning needs (meds, wound care, etc)   Refer to discharge planning if patient needs post-hospital services based on physician order or complex needs related to functional status, cognitive ability or social support system     Problem: Chronic Conditions and Co-morbidities  Goal: Patient's chronic conditions and co-morbidity symptoms are monitored and maintained or improved  7/31/2023 1656 by Rosa Maria Quispe RN  Outcome: Progressing  Flowsheets (Taken 7/31/2023 0800)  Care Plan - Patient's Chronic Conditions and Co-Morbidity Symptoms are Monitored and Maintained or Improved:   Monitor and assess patient's chronic conditions and comorbid symptoms for stability, deterioration, or improvement   Collaborate with multidisciplinary team to address chronic and comorbid conditions and prevent exacerbation or deterioration  7/31/2023 0635 by Eriberto Ramirez RN  Outcome: Progressing  Flowsheets (Taken 7/30/2023 2100)  Care Plan - Patient's Chronic Conditions and Co-Morbidity Symptoms are Monitored and Maintained or Improved:   Monitor and assess patient's chronic conditions and comorbid symptoms for stability, deterioration, or improvement   Collaborate with

## 2023-07-31 NOTE — CARE COORDINATION
Transitional Planning  Spoke with patient, agreeable to [facility], requests referral to [address] [place] [address]. Referral sent. BENIGNO Beasley NP regarding order for PM&R needed. Referral sent. 105 pm Call from Lionel at [facility], referral received, waiting for PM&R consult. 315 pm Spoke with Katherine Magana from [facility], patient needing PT and OT notes for PM&R, PT/OT called.

## 2023-07-31 NOTE — PLAN OF CARE
Problem: Discharge Planning  Goal: Discharge to home or other facility with appropriate resources  Outcome: Progressing  Flowsheets (Taken 7/30/2023 2100)  Discharge to home or other facility with appropriate resources:   Identify barriers to discharge with patient and caregiver   Arrange for needed discharge resources and transportation as appropriate   Identify discharge learning needs (meds, wound care, etc)   Refer to discharge planning if patient needs post-hospital services based on physician order or complex needs related to functional status, cognitive ability or social support system     Problem: Chronic Conditions and Co-morbidities  Goal: Patient's chronic conditions and co-morbidity symptoms are monitored and maintained or improved  Outcome: Progressing  Flowsheets (Taken 7/30/2023 2100)  Care Plan - Patient's Chronic Conditions and Co-Morbidity Symptoms are Monitored and Maintained or Improved:   Monitor and assess patient's chronic conditions and comorbid symptoms for stability, deterioration, or improvement   Collaborate with multidisciplinary team to address chronic and comorbid conditions and prevent exacerbation or deterioration   Update acute care plan with appropriate goals if chronic or comorbid symptoms are exacerbated and prevent overall improvement and discharge     Problem: Pain  Goal: Verbalizes/displays adequate comfort level or baseline comfort level  Outcome: Progressing  Flowsheets (Taken 7/30/2023 2100)  Verbalizes/displays adequate comfort level or baseline comfort level:   Encourage patient to monitor pain and request assistance   Assess pain using appropriate pain scale   Administer analgesics based on type and severity of pain and evaluate response   Implement non-pharmacological measures as appropriate and evaluate response   Consider cultural and social influences on pain and pain management   Notify Licensed Independent Practitioner if interventions unsuccessful or patient

## 2023-07-31 NOTE — PROGRESS NOTES
above for LOF). Completed BUE AAROM shoulders to digits in multi plane 10 reps each. Pt needs increased time and assist w/all aspects of ADL's d/t fatige, pain and limited ROM to reach LB. Pt states has an aide 2 days per week PTA who assists pt w/shower.                  Cognition  Overall Cognitive Status: WFL  Orientation  Overall Orientation Status: Within Functional Limits  Orientation Level: Oriented X4                  Education Given To: Patient  Education Provided: Role of Therapy;Plan of Care;Transfer Training  Education Method: Verbal  Barriers to Learning: None  Education Outcome: Verbalized understanding;Continued education needed;Demonstrated understanding    AM-PAC Score  AM-PAC Inpatient Daily Activity Raw Score: 11 (07/31/23 1810)  AM-PAC Inpatient ADL T-Scale Score : 29.04 (07/31/23 1810)  ADL Inpatient CMS 0-100% Score: 70.42 (07/31/23 1810)  ADL Inpatient CMS G-Code Modifier : CL (07/31/23 1810)         Goals  Short Term Goals  Time Frame for Short Term Goals: By discharge  Short Term Goal 1: Pt will engage in PROM/AAROM BUE across all joints in all planes to decrease risk of contractor and promote functional use  Short Term Goal 2: Pt will engage in 5+ mins [facility] activities/HEP each session to increase B grasp and coordination  Short Term Goal 3: Pt will engage in BUE WB 10+ mins to increase functional use  Short Term Goal 4: Pt will complete simple UB ADL's with Min A, AE and <2 VC's  Short Term Goal 5: Pt will complete sit<>stand transfers Min A x2 with LRD  Short Term Goal 6: Pt will engage in LB ADL's with Mod A and AE/DME/modified techniques  Short Term Goal 7: Pt will complete bed mobility Mod A x2  Short Term Goal 8: Pt will demo Good safety throughout session throughout session with Min VC's  Long Term Goals  Time Frame for Long Term Goals : NOTIFY OTR TO UPDATE GOALS AS PT PROGRESSESS       Therapy Time   Individual Concurrent Group Co-treatment   Time In 1730         Time Out 1753 Minutes 23         Timed Code Treatment Minutes: [facility]3] A JONI CHACON/L

## 2023-07-31 NOTE — PROGRESS NOTES
[address]  PROGRESS NOTE    Shift date: 7/31/2023   Shift day: Monday   Shift # 1    Room # 5820/1774-65   Name: Angi Moss                Baptism: [facility]:     Referral: Routine Visit    Admit Date & Time: 7/26/2023  5:45 AM    Assessment:  Angi Moss is a 68 y.o. female. Upon entering the room writer observes pt sitting up slightly in bed with a c-collar on. Pt asked for bed to be lowered.  called nurse who said pt needs to stay sitting up, per doctor's orders. Pt said her daughter had just been there. She became tearful at times and expressed that she is afraid she will not get better. She said \"I wish I never had this surgery\". Pt asked for prayer. Intervention:  Writer introduced self and title as .  provided support through active listening and words of affirmation and encouragement.  stood at bedside holding patient's hand and wiping her tears, at pt's request. Troy Began prayed with patient. Outcome:  Pt appeared to be comforted by 's visit. She smiled after nurse helped her. Pt expressed gratitude for prayer and support. Plan:  Chaplains will remain available to offer spiritual and emotional support as needed.       Electronically signed by Sathish Gonzalez on 7/31/2023 at 10:41 AM.  [address] [facility]  [phone]

## 2023-07-31 NOTE — PROGRESS NOTES
Dr. Andre Stoddard concerned regarding patient not ambulating. Requests PT to come work with patient today. Unfortunately, PT leaves at 1630. This Am patient lifted to chair to promote lung toileting. After Dr. Andre Stoddard visit patient stood with the Walda Lesches stedy. Requires a lot of support to straighten spine to maximum 75%. Attempted to get patient to march in place while standing on Walda Lesches steady but patient too weak to lift feet.  3 RN and 1 PCT at bedside for transfer   Charly Miguel RN

## 2023-08-01 LAB
ANION GAP SERPL CALCULATED.3IONS-SCNC: 12 MMOL/L (ref 9–17)
BASOPHILS # BLD: 0.05 K/UL (ref 0–0.2)
BASOPHILS NFR BLD: 1 % (ref 0–2)
BUN SERPL-MCNC: 15 MG/DL (ref 8–23)
CALCIUM SERPL-MCNC: 8.6 MG/DL (ref 8.6–10.4)
CHLORIDE SERPL-SCNC: 102 MMOL/L (ref 98–107)
CO2 SERPL-SCNC: 26 MMOL/L (ref 20–31)
CREAT SERPL-MCNC: 0.6 MG/DL (ref 0.5–0.9)
EOSINOPHIL # BLD: 0.65 K/UL (ref 0–0.44)
EOSINOPHILS RELATIVE PERCENT: 10 % (ref 1–4)
ERYTHROCYTE [DISTWIDTH] IN BLOOD BY AUTOMATED COUNT: 13.2 % (ref 11.8–14.4)
GFR SERPL CREATININE-BSD FRML MDRD: >60 ML/MIN/1.73M2
GLUCOSE SERPL-MCNC: 131 MG/DL (ref 70–99)
HCT VFR BLD AUTO: 29.9 % (ref 36.3–47.1)
HGB BLD-MCNC: 9.2 G/DL (ref 11.9–15.1)
IMM GRANULOCYTES # BLD AUTO: 0.03 K/UL (ref 0–0.3)
IMM GRANULOCYTES NFR BLD: 0 %
LYMPHOCYTES NFR BLD: 1.43 K/UL (ref 1.1–3.7)
LYMPHOCYTES RELATIVE PERCENT: 21 % (ref 24–43)
MCH RBC QN AUTO: 31 PG (ref 25.2–33.5)
MCHC RBC AUTO-ENTMCNC: 30.8 G/DL (ref 28.4–34.8)
MCV RBC AUTO: 100.7 FL (ref 82.6–102.9)
MONOCYTES NFR BLD: 0.76 K/UL (ref 0.1–1.2)
MONOCYTES NFR BLD: 11 % (ref 3–12)
NEUTROPHILS NFR BLD: 57 % (ref 36–65)
NEUTS SEG NFR BLD: 3.84 K/UL (ref 1.5–8.1)
NRBC BLD-RTO: 0 PER 100 WBC
PLATELET # BLD AUTO: 231 K/UL (ref 138–453)
PMV BLD AUTO: 9.4 FL (ref 8.1–13.5)
POTASSIUM SERPL-SCNC: 3.9 MMOL/L (ref 3.7–5.3)
RBC # BLD AUTO: 2.97 M/UL (ref 3.95–5.11)
SODIUM SERPL-SCNC: 140 MMOL/L (ref 135–144)
WBC OTHER # BLD: 6.8 K/UL (ref 3.5–11.3)

## 2023-08-01 PROCEDURE — 6370000000 HC RX 637 (ALT 250 FOR IP)

## 2023-08-01 PROCEDURE — 2580000003 HC RX 258

## 2023-08-01 PROCEDURE — 85025 COMPLETE CBC W/AUTO DIFF WBC: CPT

## 2023-08-01 PROCEDURE — 99222 1ST HOSP IP/OBS MODERATE 55: CPT | Performed by: NURSE PRACTITIONER

## 2023-08-01 PROCEDURE — 6360000002 HC RX W HCPCS: Performed by: NURSE PRACTITIONER

## 2023-08-01 PROCEDURE — 97530 THERAPEUTIC ACTIVITIES: CPT

## 2023-08-01 PROCEDURE — 97110 THERAPEUTIC EXERCISES: CPT

## 2023-08-01 PROCEDURE — 2060000000 HC ICU INTERMEDIATE R&B

## 2023-08-01 PROCEDURE — 2580000003 HC RX 258: Performed by: STUDENT IN AN ORGANIZED HEALTH CARE EDUCATION/TRAINING PROGRAM

## 2023-08-01 PROCEDURE — 94761 N-INVAS EAR/PLS OXIMETRY MLT: CPT

## 2023-08-01 PROCEDURE — 36415 COLL VENOUS BLD VENIPUNCTURE: CPT

## 2023-08-01 PROCEDURE — 99222 1ST HOSP IP/OBS MODERATE 55: CPT | Performed by: PHYSICAL MEDICINE & REHABILITATION

## 2023-08-01 PROCEDURE — 6360000002 HC RX W HCPCS: Performed by: STUDENT IN AN ORGANIZED HEALTH CARE EDUCATION/TRAINING PROGRAM

## 2023-08-01 PROCEDURE — 6370000000 HC RX 637 (ALT 250 FOR IP): Performed by: STUDENT IN AN ORGANIZED HEALTH CARE EDUCATION/TRAINING PROGRAM

## 2023-08-01 PROCEDURE — APPSS30 APP SPLIT SHARED TIME 16-30 MINUTES: Performed by: PHYSICIAN ASSISTANT

## 2023-08-01 PROCEDURE — 80048 BASIC METABOLIC PNL TOTAL CA: CPT

## 2023-08-01 PROCEDURE — 6370000000 HC RX 637 (ALT 250 FOR IP): Performed by: NURSE PRACTITIONER

## 2023-08-01 RX ORDER — POLYETHYLENE GLYCOL 3350 17 G/17G
17 POWDER, FOR SOLUTION ORAL DAILY
Status: DISCONTINUED | OUTPATIENT
Start: 2023-08-02 | End: 2023-08-04 | Stop reason: HOSPADM

## 2023-08-01 RX ORDER — CARVEDILOL 12.5 MG/1
12.5 TABLET ORAL 2 TIMES DAILY WITH MEALS
Status: DISCONTINUED | OUTPATIENT
Start: 2023-08-01 | End: 2023-08-04 | Stop reason: HOSPADM

## 2023-08-01 RX ORDER — GABAPENTIN 100 MG/1
100 CAPSULE ORAL 2 TIMES DAILY
Status: DISCONTINUED | OUTPATIENT
Start: 2023-08-01 | End: 2023-08-04 | Stop reason: HOSPADM

## 2023-08-01 RX ORDER — BUSPIRONE HYDROCHLORIDE 5 MG/1
5 TABLET ORAL 3 TIMES DAILY
Status: DISCONTINUED | OUTPATIENT
Start: 2023-08-01 | End: 2023-08-04 | Stop reason: HOSPADM

## 2023-08-01 RX ORDER — AMOXICILLIN AND CLAVULANATE POTASSIUM 875; 125 MG/1; MG/1
1 TABLET, FILM COATED ORAL EVERY 12 HOURS SCHEDULED
Status: COMPLETED | OUTPATIENT
Start: 2023-08-01 | End: 2023-08-03

## 2023-08-01 RX ORDER — FUROSEMIDE 40 MG/1
40 TABLET ORAL 2 TIMES DAILY
Status: DISCONTINUED | OUTPATIENT
Start: 2023-08-01 | End: 2023-08-04 | Stop reason: HOSPADM

## 2023-08-01 RX ORDER — OXYCODONE HYDROCHLORIDE 5 MG/1
5 TABLET ORAL EVERY 4 HOURS PRN
Status: DISCONTINUED | OUTPATIENT
Start: 2023-08-01 | End: 2023-08-02

## 2023-08-01 RX ORDER — CITALOPRAM 20 MG/1
20 TABLET ORAL DAILY
Status: DISCONTINUED | OUTPATIENT
Start: 2023-08-02 | End: 2023-08-04 | Stop reason: HOSPADM

## 2023-08-01 RX ORDER — OXYCODONE HYDROCHLORIDE 5 MG/1
10 TABLET ORAL EVERY 4 HOURS PRN
Status: DISCONTINUED | OUTPATIENT
Start: 2023-08-01 | End: 2023-08-02

## 2023-08-01 RX ADMIN — BUSPIRONE HYDROCHLORIDE 5 MG: 5 TABLET ORAL at 20:19

## 2023-08-01 RX ADMIN — SODIUM CHLORIDE, PRESERVATIVE FREE 10 ML: 5 INJECTION INTRAVENOUS at 22:22

## 2023-08-01 RX ADMIN — OXYCODONE HYDROCHLORIDE 10 MG: 5 TABLET ORAL at 07:46

## 2023-08-01 RX ADMIN — CITALOPRAM 20 MG: 20 TABLET, FILM COATED ORAL at 07:46

## 2023-08-01 RX ADMIN — FUROSEMIDE 40 MG: 40 TABLET ORAL at 20:19

## 2023-08-01 RX ADMIN — HYDROMORPHONE HYDROCHLORIDE 0.5 MG: 1 INJECTION, SOLUTION INTRAMUSCULAR; INTRAVENOUS; SUBCUTANEOUS at 05:49

## 2023-08-01 RX ADMIN — OXYCODONE HYDROCHLORIDE 10 MG: 5 TABLET ORAL at 20:19

## 2023-08-01 RX ADMIN — LANSOPRAZOLE 15 MG: 30 TABLET, ORALLY DISINTEGRATING, DELAYED RELEASE ORAL at 07:46

## 2023-08-01 RX ADMIN — BUSPIRONE HYDROCHLORIDE 5 MG: 5 TABLET ORAL at 07:45

## 2023-08-01 RX ADMIN — SODIUM CHLORIDE, PRESERVATIVE FREE 10 ML: 5 INJECTION INTRAVENOUS at 10:13

## 2023-08-01 RX ADMIN — OXYCODONE HYDROCHLORIDE 10 MG: 5 TABLET ORAL at 02:27

## 2023-08-01 RX ADMIN — AMOXICILLIN AND CLAVULANATE POTASSIUM 1 TABLET: 875; 125 TABLET, FILM COATED ORAL at 08:09

## 2023-08-01 RX ADMIN — CARVEDILOL 12.5 MG: 12.5 TABLET, FILM COATED ORAL at 07:46

## 2023-08-01 RX ADMIN — FUROSEMIDE 40 MG: 40 TABLET ORAL at 07:46

## 2023-08-01 RX ADMIN — OXYCODONE HYDROCHLORIDE 10 MG: 5 TABLET ORAL at 12:15

## 2023-08-01 RX ADMIN — GABAPENTIN 100 MG: 100 CAPSULE ORAL at 22:17

## 2023-08-01 RX ADMIN — AMOXICILLIN AND CLAVULANATE POTASSIUM 1 TABLET: 875; 125 TABLET, FILM COATED ORAL at 20:20

## 2023-08-01 RX ADMIN — SODIUM CHLORIDE, PRESERVATIVE FREE 10 ML: 5 INJECTION INTRAVENOUS at 08:09

## 2023-08-01 RX ADMIN — SODIUM CHLORIDE, PRESERVATIVE FREE 10 ML: 5 INJECTION INTRAVENOUS at 22:21

## 2023-08-01 RX ADMIN — GABAPENTIN 100 MG: 100 CAPSULE ORAL at 10:12

## 2023-08-01 RX ADMIN — HYDROXYZINE HYDROCHLORIDE 25 MG: 25 TABLET ORAL at 20:19

## 2023-08-01 RX ADMIN — ENOXAPARIN SODIUM 40 MG: 100 INJECTION SUBCUTANEOUS at 07:46

## 2023-08-01 RX ADMIN — HYDROXYZINE HYDROCHLORIDE 25 MG: 25 TABLET ORAL at 00:34

## 2023-08-01 ASSESSMENT — PAIN SCALES - GENERAL
PAINLEVEL_OUTOF10: 7
PAINLEVEL_OUTOF10: 9
PAINLEVEL_OUTOF10: 7
PAINLEVEL_OUTOF10: 5
PAINLEVEL_OUTOF10: 7
PAINLEVEL_OUTOF10: 2
PAINLEVEL_OUTOF10: 7
PAINLEVEL_OUTOF10: 7
PAINLEVEL_OUTOF10: 8

## 2023-08-01 ASSESSMENT — PAIN DESCRIPTION - ORIENTATION: ORIENTATION: POSTERIOR;ANTERIOR

## 2023-08-01 ASSESSMENT — PAIN DESCRIPTION - LOCATION
LOCATION: NECK
LOCATION: NECK

## 2023-08-01 ASSESSMENT — PAIN DESCRIPTION - DESCRIPTORS
DESCRIPTORS: DISCOMFORT
DESCRIPTORS: DISCOMFORT

## 2023-08-01 NOTE — PROGRESS NOTES
substance by straw. 3. Deep penetration without aspiration with the nectar thick substance by   teaspoon. 4. Shallow penetration without aspiration with the honey thick substance. 5. No penetration or aspiration with the pureed/pudding thick, soft solid and   cookie solid substances. Please see separate speech pathology report for full discussion of findings   and recommendations. CT CERVICAL SPINE WO CONTRAST   Preliminary Result   Right C2 screw position as described. Other expected recent postoperative changes from C2-T2. FLUORO FOR SURGICAL PROCEDURES   Final Result      FLUORO FOR SURGICAL PROCEDURES   Final Result      FLUORO FOR SURGICAL PROCEDURES   Final Result            Labs and Images reviewed with:  [x] Dr. Carina De Luna  [] Dr. Anton Briggs  [] There are no new interval images to review. PHYSICAL EXAM       CONSTITUTIONAL:  Well developed, well nourished, alert and oriented x 3, in no acute distress. GCS 15. Nontoxic. No dysarthria. No aphasia. HEAD:  normocephalic, atraumatic    EYES:  PERRL, EOMI.   ENT:  moist mucous membranes   NECK:  supple, symmetric   LUNGS:  Equal air entry bilaterally   CARDIOVASCULAR:  normal s1 / s2, RRR, distal pulses intact   ABDOMEN:  Soft, no rigidity   NEUROLOGIC:  Mental Status:  A & O x3,awake             Cranial Nerves:    cranial nerves II-XII are grossly intact    Motor Exam:    Antigravity in all extremities. Sensory:    Touch:    Right Upper Extremity:  normal  Left Upper Extremity:  normal  Right Lower Extremity:  normal  Left Lower Extremity:  normal       DRAINS:  [x] There are no drains for Neuro Critical Care to monitor at this time.      ASSESSMENT AND PLAN:     Patient is a 69-year-old femalewith a history of CAD on eliquis, CHF, HTN, TIA, who presents to the neuro ICU following scheduled anterior cervical discectomy, osteotomy, fusion C6-7, posterior cervical osteotomy C6-7, cervical fusion C2-T2  for cervical MD  Neuro Critical Care  Pager [phone]  8/1/2023     7:11 AM

## 2023-08-01 NOTE — PROGRESS NOTES
cueing for proper hand placement in natan gordillo and on RW with good return demo. ModAx2 required in natan duy, maxAx2 required from bedside chair with RW. Ambulation  Comments: Pt unable to progress bilateral LE for ambulation this date. Pt performs 2 standing marches while at chairside maxAx2, pt demonstrating significant difficulty with clearance of RLE. More Ambulation?: No  Stairs/Curb  Stairs?: No     Balance  Posture: Fair  Sitting - Static: Fair;-  Sitting - Dynamic: Poor;+  Standing - Static: Poor  Comments: pt sat unsupported ~10 minutes CGA, pt requiring intermittent verbal cueing to correct L lateral lean with fatigue; standing balance assessed with BUE support on natan gordillo    Seated exercises: LAQs, Ankle pumps, AAROM hip flexion x15 bilateral LE  Comments: Pt required verbal cueing to perform exercises through full ROM. AM-PAC Score  AM-PAC Inpatient Mobility Raw Score : 6 (08/01/23 0928)  AM-PAC Inpatient T-Scale Score : 23.55 (08/01/23 0786)  Mobility Inpatient CMS 0-100% Score: 100 (08/01/23 0928)  Mobility Inpatient CMS G-Code Modifier : CN (08/01/23 9813)  Goals  Short Term Goals  Time Frame for Short Term Goals: 14 visits  Short Term Goal 1: Perform bed mobility with Mod A  Short Term Goal 2: Perform functional transfers with Min A  Short Term Goal 3: Ambulate 100ft with appropriate AD and Min A  Short Term Goal 4: Demo Fair- dynamic standing balance to decrease risk of falls       Education  Patient Education  Education Given To: Patient  Education Provided: Role of Therapy;Plan of Care;Transfer Training; Fall Prevention Strategies  Education Provided Comments: Donning/ doffing c-collar, purpose of attempting sit to stand with RW; LE seated exercise program  Education Method: Verbal;Demonstration  Barriers to Learning: None  Education Outcome: Verbalized understanding;Continued education needed      Therapy Time   Individual Concurrent Group Co-treatment   Time In 0833         Time Out 0560 Minutes 33         Timed Code Treatment Minutes: 30 Minutes       Sebastian Carranza, PT [facility]  Hematology/Oncology  [address] [place] [address]  Phone: [phone]  Fax:   Follow Up Time: Urgent

## 2023-08-01 NOTE — CARE COORDINATION
ACUTE INPATIENT REHABILITATION  Financial Disclosure Statement: Eligibility and Benefit Verification    Patient Name: Eda Hernandez MRN: [id number]     Disclosure [facility] Acute Inpatient Rehabilitation at [facility] provides 24 hour individualized service to patients with functional limitations due to, but not limited to stroke, brain injury, spinal cord injury, major multiple trauma, hip fracture, amputation, and neurological disorders. Acute Inpatient Rehabilitation provides rehabilitative nursing, physician coverage, as well as physical therapy, occupational therapy, speech therapy, recreational therapy and other services as deemed necessary by our Board Certified Physical Medicine and [address], Dr. Tere Manzano and Dr. Ana Gant and Dr. Luis Pop. [facility]) Acute Inpatient Rehabilitation at [facility] is fully accredited by the Commission on Accreditation of Rehabilitation Facilities (CARF) and The Joint Commission (TJC). At a minimum, you will receive 5 days of therapy services totaling at least 15 hours per week. Your treatment program will consist of physical therapy at least 7.5 hours per week; occupational therapy 7.5 hours per week; and speech therapy 1.5 hours per week, as applicable. Your estimated length of stay is currently:  Approximately 2 Weeks  Please Note: Estimated length of stay is based on individual condition and Acute Inpatient Rehabilitation specific needs. Length of stay may vary based upon interdisciplinary team assessment, insurance approval, and patient progression. Your insurance coverage has been verified as follows:   Date Verified: 8/1/2023   Method:  Phone Call: Call Ref# , Representative: Stephanie SANTILLAN      Primary Insurance: [facility] Medicare  Coverage: Per Benefit Period  Plan Effective Date: 1/1/2023 Status: Active  Deductible: Not Applicable   Co-Pay: Not Applicable  Co-Insurance: 20%  Maximum Out of Pocket:

## 2023-08-01 NOTE — CARE COORDINATION
Transitional Planning  Spoke with Dr. Landen Ji, patient is appropriate for IPR. Called [facility] Admission, message left. Requested call back. 1103 am Call from Lionel at [facility], she will start precert. 100 pm Received call from Lionel at [facility] stating unable to start precert for patient due to only give choices of [facility] and [facility]. Called [facility] at [phone], initiated acute care and also IPR auth. Reference number [id number] fax number [phone] include Name, , Members ID.      142 pm Called Lionel and voice mail left. Lionel called immediately back, she will upload or fax the clinicals for IPR, she already printed the documents.

## 2023-08-01 NOTE — ACP (ADVANCE CARE PLANNING)
Advance Care Planning     Advance Care Planning Activator (Inpatient)  Conversation Note      Date of ACP Conversation: 8/1/2023     Conversation Conducted with: Patient with Decision Making Capacity    ACP Activator: Dariana De La Torre, [address] Decision Maker:     Current Designated Health Care Decision Maker:     Primary Decision Maker: oKrin Coronel - Spouse - [phone]    Secondary Decision Maker: Fátima Sheth - Child - [phone]  Care Preferences    Ventilation: \"If you were in your present state of health and suddenly became very ill and were unable to breathe on your own, what would your preference be about the use of a ventilator (breathing machine) if it were available to you? \"      Would the patient desire the use of ventilator (breathing machine)?: yes    \"If your health worsens and it becomes clear that your chance of recovery is unlikely, what would your preference be about the use of a ventilator (breathing machine) if it were available to you? \"     Would the patient desire the use of ventilator (breathing machine)?: Yes      Resuscitation  \"CPR works best to restart the heart when there is a sudden event, like a heart attack, in someone who is otherwise healthy. Unfortunately, CPR does not typically restart the heart for people who have serious health conditions or who are very sick. \"    \"In the event your heart stopped as a result of an underlying serious health condition, would you want attempts to be made to restart your heart (answer \"yes\" for attempt to resuscitate) or would you prefer a natural death (answer \"no\" for do not attempt to resuscitate)? \" yes       [] Yes   [] No   Educated Patient / Katia Zelaya regarding differences between Advance Directives and portable DNR orders.     Length of ACP Conversation in minutes:      Conversation Outcomes:  ACP discussion completed    Follow-up plan:    [] Schedule follow-up conversation to continue planning  [] Referred individual to

## 2023-08-01 NOTE — PROGRESS NOTES
Comprehensive Nutrition Assessment    Type and Reason for Visit:  Reassess    Nutrition Recommendations/Plan:   Recommend decreasing TF rate or holding during the day and only providing overnight to help with appetite/overall PO intake. If nocturnal feeds desired, suggest Osmolite 1.2 (standard without fiber), goal of 75 mL/hr x 10 hours overnight. Send Magic Cup ONS with meals. Nutrition Assessment:    Pt's diet advanced yesterday following SLP elizabeth. Currently on dysphagia I puree with mildly thick liquids. RN reports pt ate ~50% of breakfast this morning. RN notes pt does better with finger foods, which is difficulty d/t need for puree. TF continues at 55 mL/hr at time of visit - tolerating well. Nutrition Related Findings:    meds/labs reviewed; NGT in place Wound Type: None       Current Nutrition Intake & Therapies:    Average Meal Intake: 26-50%, 51-75%  Average Supplements Intake: None Ordered  ADULT DIET; Dysphagia - Pureed; Mildly Thick (Nectar)  ADULT TUBE FEEDING; Nasogastric; Standard without Fiber; Continuous; 20; No; 30; Q 4 hours  ADULT ORAL NUTRITION SUPPLEMENT; Breakfast, Lunch, Dinner; Frozen Oral Supplement  Current Tube Feeding (TF) Orders:  Feeding Route: Nasogastric  Formula: Standard without Fiber  Schedule: Continuous  Feeding Regimen: 55 mL/hr  Additives/Modulars: None  Water Flushes: 30 mL q 4 hours  Current TF & Flush Orders Provides: Osmolite 1.2 at 55 mL/hr = 1584 kcals, 73 gm protein per day    Anthropometric Measures:  Height: 5' 9\" (175.3 cm)  Ideal Body Weight (IBW): 145 lbs (66 kg)       Current Body Weight: 236 lb 15.9 oz (107.5 kg), 163.4 % IBW.     Current BMI (kg/m2): 35                          BMI Categories: Obese Class 2 (BMI 35.0 -39.9)    Estimated Daily Nutrient Needs:  Energy Requirements Based On: Formula  Weight Used for Energy Requirements: Current  Energy (kcal/day): 1800 kcals/day  Weight Used for Protein Requirements: Ideal  Protein (g/day): 60-75gm

## 2023-08-01 NOTE — PLAN OF CARE
Problem: Discharge Planning  Goal: Discharge to home or other facility with appropriate resources  Outcome: Progressing     Problem: Chronic Conditions and Co-morbidities  Goal: Patient's chronic conditions and co-morbidity symptoms are monitored and maintained or improved  Outcome: Progressing     Problem: Pain  Goal: Verbalizes/displays adequate comfort level or baseline comfort level  Outcome: Progressing     Problem: Safety - Adult  Goal: Free from fall injury  Outcome: Progressing     Problem: Respiratory - Adult  Goal: Achieves optimal ventilation and oxygenation  Outcome: Progressing     Problem: Skin/Tissue Integrity  Goal: Absence of new skin breakdown  Description: 1. Monitor for areas of redness and/or skin breakdown  2. Assess vascular access sites hourly  3. Every 4-6 hours minimum:  Change oxygen saturation probe site  4. Every 4-6 hours:  If on nasal continuous positive airway pressure, respiratory therapy assess nares and determine need for appliance change or resting period.   Outcome: Progressing     Problem: Nutrition Deficit:  Goal: Optimize nutritional status  8/1/2023 1735 by Patricia Acevedo RN  Outcome: Progressing  8/1/2023 1230 by Laura Cazares MS, RD, LD  Outcome: Progressing  Flowsheets (Taken 8/1/2023 1220)  Nutrient intake appropriate for improving, restoring, or maintaining nutritional needs:   Recommend, monitor, and adjust tube feedings and TPN/PPN based on assessed needs   Assess nutritional status and recommend course of action   Monitor oral intake, labs, and treatment plans   Recommend appropriate diets, oral nutritional supplements, and vitamin/mineral supplements     Problem: ABCDS Injury Assessment  Goal: Absence of physical injury  Outcome: Progressing

## 2023-08-01 NOTE — CONSULTS
BID  gabapentin (NEURONTIN) capsule 100 mg, 100 mg, Per NG tube, BID  lansoprazole (PREVACID SOLUTAB) disintegrating tablet 15 mg, 15 mg, Per NG tube, QAM AC  polyethylene glycol (GLYCOLAX) packet 17 g, 17 g, Per NG tube, Daily  senna (SENOKOT) 8.8 MG/5ML syrup 8.8 mg, 5 mL, Per NG tube, Nightly  docusate (COLACE) 50 MG/5ML liquid 100 mg, 100 mg, Per NG tube, Daily  oxyCODONE (ROXICODONE) immediate release tablet 5 mg, 5 mg, Per NG tube, Q4H PRN **OR** [PENDING] oxyCODONE (ROXICODONE) immediate release tablet 5 mg, 5 mg, Oral, Q4H PRN **OR** oxyCODONE (ROXICODONE) immediate release tablet 10 mg, 10 mg, Per NG tube, Q4H PRN **OR** [PENDING] oxyCODONE (ROXICODONE) immediate release tablet 10 mg, 10 mg, Oral, Q4H PRN  acetaminophen (TYLENOL) suppository 650 mg, 650 mg, Rectal, Q4H PRN **OR** acetaminophen (TYLENOL) tablet 650 mg, 650 mg, Oral, Q4H PRN  albuterol sulfate HFA (PROVENTIL;VENTOLIN;PROAIR) 108 (90 Base) MCG/ACT inhaler 2 puff, 2 puff, Inhalation, Q6H PRN **AND** ipratropium (ATROVENT HFA) 17 MCG/ACT inhaler 2 puff, 2 puff, Inhalation, Q6H PRN  bisacodyl (DULCOLAX) suppository 10 mg, 10 mg, Rectal, Daily PRN  enoxaparin (LOVENOX) injection 40 mg, 40 mg, SubCUTAneous, Daily  hypromellose 0.3 % ophthalmic gel, , Left Eye, Q2H PRN  hydrALAZINE (APRESOLINE) injection 5 mg, 5 mg, IntraVENous, Q6H PRN  hydrOXYzine HCl (ATARAX) tablet 25 mg, 25 mg, Oral, TID PRN  scopolamine (TRANSDERM-SCOP) transdermal patch 1 patch, 1 patch, TransDERmal, Q72H  sodium chloride flush 0.9 % injection 5-40 mL, 5-40 mL, IntraVENous, 2 times per day  sodium chloride flush 0.9 % injection 5-40 mL, 5-40 mL, IntraVENous, 2 times per day  sodium chloride flush 0.9 % injection 5-40 mL, 5-40 mL, IntraVENous, PRN  0.9 % sodium chloride infusion, , IntraVENous, PRN  ondansetron (ZOFRAN-ODT) disintegrating tablet 4 mg, 4 mg, Oral, Q8H PRN **OR** ondansetron (ZOFRAN) injection 4 mg, 4 mg, IntraVENous, Q6H PRN    Social History:  Lives With: and sound a like substitutions which may escape proof reading. In such instances, actual meaning can be extrapolated by contextual diversion.

## 2023-08-01 NOTE — CARE COORDINATION
Attempted to start authorization for IPR via [facility]. Appears acute care stay has not been submitted and only option at this time is submit a request for \"start of care. \" Spoke with Lisa Mancilla, Fon who states she will call to initiate acute care stay with [facility] and see if she can initiate both acute care stay and IPR request at the same time. Requested return call from  with update when available. Electronically signed by Ammon Lucero PTA on 8/1/2023 at 1:06 PM    Received call from  that IRF prior authorization was initiated. Writer uploaded clinical packet to [facility] case at this time.      Electronically signed by Ammon Lucero PTA on 8/1/2023 at 1:46 PM

## 2023-08-01 NOTE — CONSULTS
[facility]  Office: [address], DO, Natalia Aloe, DO, Donnie Wadeex, DO, Maralesia Ellis Blood, DO, Aide Dover MD, Luiz Rawls MD, Al Rebolledo MD, Crystal Garcia MD,  Rossy Solorzano MD, Mil Lawton MD, Vangie Elmore, DO, Emmy Dugan MD,  Anh Quinn MD, Nena Berry MD, Judith Avila, DO, Iram Hernández MD,  Ibis Salcedo, DO, Radha Hooks MD, Lewis Luna MD, Vamsi Arredondo MD, Erika Vargas MD,  Ori Garza MD, Samantha Galvan MD, Daniel Thompson DO, Suman Santizo MD,  Karla Santizo MD, Beverly Zambrano, CNP,  Judah Baugh, CNP, Juan Shin, CNP, Joey Lew, CNP,  Cailin Francois, [facility], [place]), Leonard Lakhani, CNP, Meme Cervantes, CNP, Anastacia De Dios, CNP, Zelda Coe, CNP, Laurel Lomax, CNP, Oj Blas PA-C, [place], [facility], Olivia Gibson, CNP, Елена Carrasco, [address] / HISTORY AND PHYSICAL EXAMINATION            Date:   8/1/2023  Patient name:  Abimael Bernardo  Date of admission:  7/26/2023  5:45 AM  MRN:   [id number]  Account:  [de-identified]  YOB: 1950  PCP:    Hugh Herrera MD  Room:   0117/0117-01  Code Status:    Full Code    Physician Requesting Consult: Jani Guy DO    Reason for Consult:  medical management/transition out of ICU    Chief Complaint:     Neck pain     History Obtained From:     patient, electronic medical record    History of Present Illness:     Per ICU course:   Patient initially presented for ANTERIOR CERVICAL DISCECTOMY, FUSION  C6-7; POSTERIOR CERVICAL LAMINECTOMY C1-2, CERVICAL FUSION C2-T2, after continued evaluations for neck pain, weakness and inability to hold her neck easily. Per patient she also had upper extremity weakness on the left greater than the right. Surgical procedure was done on 7/26/2023.   Postoperatively she was unable to wean from the vent and was admitted to

## 2023-08-02 PROBLEM — R79.89 AZOTEMIA: Status: ACTIVE | Noted: 2023-08-02

## 2023-08-02 LAB
ANION GAP SERPL CALCULATED.3IONS-SCNC: 9 MMOL/L (ref 9–17)
BASOPHILS # BLD: 0.05 K/UL (ref 0–0.2)
BASOPHILS NFR BLD: 1 % (ref 0–2)
BUN SERPL-MCNC: 28 MG/DL (ref 8–23)
CALCIUM SERPL-MCNC: 8.9 MG/DL (ref 8.6–10.4)
CHLORIDE SERPL-SCNC: 98 MMOL/L (ref 98–107)
CO2 SERPL-SCNC: 28 MMOL/L (ref 20–31)
CREAT SERPL-MCNC: 0.8 MG/DL (ref 0.5–0.9)
EOSINOPHIL # BLD: 0.7 K/UL (ref 0–0.44)
EOSINOPHILS RELATIVE PERCENT: 9 % (ref 1–4)
ERYTHROCYTE [DISTWIDTH] IN BLOOD BY AUTOMATED COUNT: 13.5 % (ref 11.8–14.4)
GFR SERPL CREATININE-BSD FRML MDRD: >60 ML/MIN/1.73M2
GLUCOSE SERPL-MCNC: 106 MG/DL (ref 70–99)
HCT VFR BLD AUTO: 24.4 % (ref 36.3–47.1)
HGB BLD-MCNC: 8.8 G/DL (ref 11.9–15.1)
IMM GRANULOCYTES # BLD AUTO: 0.06 K/UL (ref 0–0.3)
IMM GRANULOCYTES NFR BLD: 1 %
LYMPHOCYTES NFR BLD: 1.57 K/UL (ref 1.1–3.7)
LYMPHOCYTES RELATIVE PERCENT: 20 % (ref 24–43)
MCH RBC QN AUTO: 36.4 PG (ref 25.2–33.5)
MCHC RBC AUTO-ENTMCNC: 36.1 G/DL (ref 28.4–34.8)
MCV RBC AUTO: 100.8 FL (ref 82.6–102.9)
MONOCYTES NFR BLD: 1.1 K/UL (ref 0.1–1.2)
MONOCYTES NFR BLD: 14 % (ref 3–12)
NEUTROPHILS NFR BLD: 57 % (ref 36–65)
NEUTS SEG NFR BLD: 4.54 K/UL (ref 1.5–8.1)
NRBC BLD-RTO: 0.2 PER 100 WBC
PLATELET # BLD AUTO: 530 K/UL (ref 138–453)
PMV BLD AUTO: 10.4 FL (ref 8.1–13.5)
POTASSIUM SERPL-SCNC: 4.2 MMOL/L (ref 3.7–5.3)
RBC # BLD AUTO: 2.42 M/UL (ref 3.95–5.11)
SODIUM SERPL-SCNC: 135 MMOL/L (ref 135–144)
WBC OTHER # BLD: 8 K/UL (ref 3.5–11.3)

## 2023-08-02 PROCEDURE — 6370000000 HC RX 637 (ALT 250 FOR IP): Performed by: NURSE PRACTITIONER

## 2023-08-02 PROCEDURE — 2060000000 HC ICU INTERMEDIATE R&B

## 2023-08-02 PROCEDURE — 80048 BASIC METABOLIC PNL TOTAL CA: CPT

## 2023-08-02 PROCEDURE — 6360000002 HC RX W HCPCS

## 2023-08-02 PROCEDURE — 2700000000 HC OXYGEN THERAPY PER DAY

## 2023-08-02 PROCEDURE — 99232 SBSQ HOSP IP/OBS MODERATE 35: CPT | Performed by: INTERNAL MEDICINE

## 2023-08-02 PROCEDURE — 85025 COMPLETE CBC W/AUTO DIFF WBC: CPT

## 2023-08-02 PROCEDURE — 6370000000 HC RX 637 (ALT 250 FOR IP): Performed by: INTERNAL MEDICINE

## 2023-08-02 PROCEDURE — 2580000003 HC RX 258

## 2023-08-02 PROCEDURE — APPSS30 APP SPLIT SHARED TIME 16-30 MINUTES: Performed by: PHYSICIAN ASSISTANT

## 2023-08-02 PROCEDURE — 36415 COLL VENOUS BLD VENIPUNCTURE: CPT

## 2023-08-02 PROCEDURE — 92526 ORAL FUNCTION THERAPY: CPT

## 2023-08-02 PROCEDURE — 6360000002 HC RX W HCPCS: Performed by: STUDENT IN AN ORGANIZED HEALTH CARE EDUCATION/TRAINING PROGRAM

## 2023-08-02 RX ORDER — OXYCODONE HYDROCHLORIDE 5 MG/1
10 TABLET ORAL EVERY 4 HOURS PRN
Status: DISCONTINUED | OUTPATIENT
Start: 2023-08-02 | End: 2023-08-04 | Stop reason: HOSPADM

## 2023-08-02 RX ORDER — DIPHENHYDRAMINE HYDROCHLORIDE 50 MG/ML
25 INJECTION INTRAMUSCULAR; INTRAVENOUS ONCE
Status: COMPLETED | OUTPATIENT
Start: 2023-08-02 | End: 2023-08-02

## 2023-08-02 RX ORDER — OXYCODONE HYDROCHLORIDE 5 MG/1
5 TABLET ORAL EVERY 4 HOURS PRN
Status: DISCONTINUED | OUTPATIENT
Start: 2023-08-02 | End: 2023-08-04 | Stop reason: HOSPADM

## 2023-08-02 RX ORDER — DIPHENHYDRAMINE HCL 25 MG
25 TABLET ORAL ONCE
Status: DISCONTINUED | OUTPATIENT
Start: 2023-08-02 | End: 2023-08-02

## 2023-08-02 RX ADMIN — CITALOPRAM 20 MG: 20 TABLET, FILM COATED ORAL at 10:02

## 2023-08-02 RX ADMIN — DIPHENHYDRAMINE HYDROCHLORIDE 25 MG: 50 INJECTION, SOLUTION INTRAMUSCULAR; INTRAVENOUS at 01:40

## 2023-08-02 RX ADMIN — SODIUM CHLORIDE, PRESERVATIVE FREE 10 ML: 5 INJECTION INTRAVENOUS at 10:03

## 2023-08-02 RX ADMIN — BENZOCAINE AND MENTHOL 1 LOZENGE: 15; 3.6 LOZENGE ORAL at 14:57

## 2023-08-02 RX ADMIN — OXYCODONE HYDROCHLORIDE 10 MG: 5 TABLET ORAL at 21:03

## 2023-08-02 RX ADMIN — CARVEDILOL 12.5 MG: 12.5 TABLET, FILM COATED ORAL at 17:31

## 2023-08-02 RX ADMIN — GABAPENTIN 100 MG: 100 CAPSULE ORAL at 21:02

## 2023-08-02 RX ADMIN — FUROSEMIDE 40 MG: 40 TABLET ORAL at 10:02

## 2023-08-02 RX ADMIN — OXYCODONE HYDROCHLORIDE 5 MG: 5 TABLET ORAL at 15:29

## 2023-08-02 RX ADMIN — OXYCODONE HYDROCHLORIDE 10 MG: 5 TABLET ORAL at 00:16

## 2023-08-02 RX ADMIN — AMOXICILLIN AND CLAVULANATE POTASSIUM 1 TABLET: 875; 125 TABLET, FILM COATED ORAL at 10:02

## 2023-08-02 RX ADMIN — AMOXICILLIN AND CLAVULANATE POTASSIUM 1 TABLET: 875; 125 TABLET, FILM COATED ORAL at 21:02

## 2023-08-02 RX ADMIN — GABAPENTIN 100 MG: 100 CAPSULE ORAL at 10:02

## 2023-08-02 RX ADMIN — DOCUSATE SODIUM 100 MG: 50 LIQUID ORAL at 10:02

## 2023-08-02 RX ADMIN — BUSPIRONE HYDROCHLORIDE 5 MG: 5 TABLET ORAL at 10:02

## 2023-08-02 RX ADMIN — SODIUM CHLORIDE, PRESERVATIVE FREE 10 ML: 5 INJECTION INTRAVENOUS at 21:06

## 2023-08-02 RX ADMIN — BUSPIRONE HYDROCHLORIDE 5 MG: 5 TABLET ORAL at 21:02

## 2023-08-02 RX ADMIN — OXYCODONE HYDROCHLORIDE 10 MG: 5 TABLET ORAL at 11:06

## 2023-08-02 RX ADMIN — BUSPIRONE HYDROCHLORIDE 5 MG: 5 TABLET ORAL at 14:32

## 2023-08-02 RX ADMIN — LANSOPRAZOLE 15 MG: 15 TABLET, ORALLY DISINTEGRATING, DELAYED RELEASE ORAL at 06:14

## 2023-08-02 RX ADMIN — FUROSEMIDE 40 MG: 40 TABLET ORAL at 21:03

## 2023-08-02 RX ADMIN — POLYETHYLENE GLYCOL 3350 17 G: 17 POWDER, FOR SOLUTION ORAL at 10:02

## 2023-08-02 RX ADMIN — OXYCODONE HYDROCHLORIDE 10 MG: 5 TABLET ORAL at 06:14

## 2023-08-02 RX ADMIN — ENOXAPARIN SODIUM 40 MG: 100 INJECTION SUBCUTANEOUS at 10:02

## 2023-08-02 RX ADMIN — CARVEDILOL 12.5 MG: 12.5 TABLET, FILM COATED ORAL at 10:11

## 2023-08-02 ASSESSMENT — PAIN DESCRIPTION - ORIENTATION
ORIENTATION: POSTERIOR;ANTERIOR
ORIENTATION: ANTERIOR;POSTERIOR
ORIENTATION: ANTERIOR
ORIENTATION: POSTERIOR;ANTERIOR
ORIENTATION: POSTERIOR;ANTERIOR
ORIENTATION: ANTERIOR;POSTERIOR
ORIENTATION: MID
ORIENTATION: ANTERIOR;POSTERIOR
ORIENTATION: ANTERIOR;POSTERIOR

## 2023-08-02 ASSESSMENT — PAIN DESCRIPTION - DESCRIPTORS
DESCRIPTORS: ACHING;BURNING
DESCRIPTORS: ACHING
DESCRIPTORS: ACHING;BURNING
DESCRIPTORS: DISCOMFORT
DESCRIPTORS: ACHING;BURNING
DESCRIPTORS: DISCOMFORT

## 2023-08-02 ASSESSMENT — PAIN DESCRIPTION - LOCATION
LOCATION: NECK
LOCATION: BACK;NECK
LOCATION: NECK
LOCATION: THROAT
LOCATION: NECK

## 2023-08-02 ASSESSMENT — PAIN SCALES - GENERAL
PAINLEVEL_OUTOF10: 4
PAINLEVEL_OUTOF10: 7
PAINLEVEL_OUTOF10: 6
PAINLEVEL_OUTOF10: 7
PAINLEVEL_OUTOF10: 7
PAINLEVEL_OUTOF10: 4
PAINLEVEL_OUTOF10: 7
PAINLEVEL_OUTOF10: 4
PAINLEVEL_OUTOF10: 4
PAINLEVEL_OUTOF10: 5
PAINLEVEL_OUTOF10: 7
PAINLEVEL_OUTOF10: 8
PAINLEVEL_OUTOF10: 4
PAINLEVEL_OUTOF10: 8

## 2023-08-02 ASSESSMENT — ENCOUNTER SYMPTOMS
ABDOMINAL PAIN: 0
SORE THROAT: 1
NAUSEA: 0
VOMITING: 0
SHORTNESS OF BREATH: 0
BACK PAIN: 1
COUGH: 0

## 2023-08-02 ASSESSMENT — PAIN - FUNCTIONAL ASSESSMENT
PAIN_FUNCTIONAL_ASSESSMENT: ACTIVITIES ARE NOT PREVENTED

## 2023-08-02 NOTE — PLAN OF CARE
Problem: Discharge Planning  Goal: Discharge to home or other facility with appropriate resources  8/2/2023 0527 by Manuela Adams RN  Outcome: Progressing  8/1/2023 1735 by Tegan London RN  Outcome: Progressing     Problem: Chronic Conditions and Co-morbidities  Goal: Patient's chronic conditions and co-morbidity symptoms are monitored and maintained or improved  8/2/2023 0527 by Manuela Adams RN  Outcome: Progressing  8/1/2023 1735 by Tegan London RN  Outcome: Progressing     Problem: Pain  Goal: Verbalizes/displays adequate comfort level or baseline comfort level  8/2/2023 0527 by Manuela Adams RN  Outcome: Progressing  8/1/2023 1735 by Tegan London RN  Outcome: Progressing     Problem: Safety - Adult  Goal: Free from fall injury  8/2/2023 0527 by Manuela Adams RN  Outcome: Progressing  8/1/2023 1735 by Tegan London RN  Outcome: Progressing     Problem: Respiratory - Adult  Goal: Achieves optimal ventilation and oxygenation  8/2/2023 0527 by Manuela Adams RN  Outcome: Progressing  8/1/2023 1735 by Tegan London RN  Outcome: Progressing     Problem: Skin/Tissue Integrity  Goal: Absence of new skin breakdown  Description: 1. Monitor for areas of redness and/or skin breakdown  2. Assess vascular access sites hourly  3. Every 4-6 hours minimum:  Change oxygen saturation probe site  4. Every 4-6 hours:  If on nasal continuous positive airway pressure, respiratory therapy assess nares and determine need for appliance change or resting period.   8/2/2023 0527 by Manuela Adams RN  Outcome: Progressing  8/1/2023 1735 by Tegan London RN  Outcome: Progressing     Problem: Nutrition Deficit:  Goal: Optimize nutritional status  8/2/2023 0527 by Manuela Adams RN  Outcome: Progressing  8/1/2023 1735 by Tegan London RN  Outcome: Progressing     Problem: ABCDS Injury Assessment  Goal: Absence of physical injury  8/2/2023 0527 by Manuela Adams RN  Outcome: Progressing  8/1/2023 1735 by Margarita Navarro, RN  Outcome: Progressing

## 2023-08-02 NOTE — CARE COORDINATION
Received phone call from [place], [place] requesting peer to peer. Intent to deny admission to Acute Inpatient Rehabilitation Stay under Auth/CaseRef# [id number]     Rationale to deny Acute Inpatient Rehabilitation level of care: Not Provided    Peer to Peer Deadline: 8/3/2023 by 10:00 am    Peer to Peer Instructions: Physician calls in directly to [phone] option 5. Will need patient name,  and member ID. Dr. Henny La notified of peer to peer request and instructions.

## 2023-08-02 NOTE — PROGRESS NOTES
hours over 7 days. [x] Further therapy recommended at discharge. [] No therapy recommended at discharge. Treatment completed by:  Karl Lopez, SLP, M.S. Francisca Chin

## 2023-08-02 NOTE — PROGRESS NOTES
Neurosurgery TRICIA/Resident    Daily Progress Note   No chief complaint on file. 8/2/2023  9:39 AM    Chart reviewed. No acute events overnight. No new complaints. Vitals:    08/02/23 0614 08/02/23 0644 08/02/23 0707 08/02/23 0727   BP:   (!) 125/94    Pulse:   68 66   Resp: 16 14 13    Temp:       TempSrc:       SpO2:   95%    Weight:       Height:             PE: AOx3   Motor   L deltoid 4/5; R deltoid 5/5  L biceps 4/5; R biceps 5/5  L triceps 4/5; R triceps 5/5  L wrist extension 4/5; R wrist extension 5/5  L intrinsics 4/5; R intrinsics 5/5      L iliopsoas 4/5 , R iliopsoas 5/5  L quadriceps 4/5; R quadriceps 5/5  L Dorsiflexion 4/5; R dorsiflexion 5/5  L Plantarflexion 4/5; R plantarflexion 5/5  L EHL 4/5; R EHL 5/5     Sensation intact      Incision c/d/i      Lab Results   Component Value Date    WBC 8.0 08/02/2023    HGB 8.8 (L) 08/02/2023    HCT 24.4 (L) 08/02/2023     (H) 08/02/2023    CHOL 140 11/22/2022    TRIG 66 11/22/2022    HDL 60 11/22/2022    ALT 15 04/11/2023    AST 17 04/11/2023     08/02/2023    K 4.2 08/02/2023    CL 98 08/02/2023    CREATININE 0.8 08/02/2023    BUN 28 (H) 08/02/2023    CO2 28 08/02/2023    TSH 1.48 09/06/2022    INR 1.2 07/12/2023    LABA1C 5.8 07/27/2023    CRP 22.7 (H) 07/21/2022    SEDRATE 12 08/25/2019       A/P  68 y.o. female who presents with cervical spondylosis  POD 7 s/p ACDF C6-7, removal of previous hardware and posterior cervical fusion C2-T2.                  - dysphagia management per medicine   - lovenox and SCD for DVT prophylaxis  - aspen collar on while out of bed                 - activity as tolerated    - prior auth initiated for rehab          Please contact neurosurgery with any changes in patients neurologic status.        Iman Pompa PA-C  8/2/23  9:39 AM

## 2023-08-02 NOTE — PROGRESS NOTES
I82.531    Closed fracture of left wrist S62.102A    B12 deficiency E53.8    Iron deficiency anemia secondary to inadequate dietary iron intake D50.8    Closed head injury S09.90XA    Scalp laceration S01. 01XA    Abnormal finding on imaging R93.89    Other irritable bowel syndrome K58.8    Frequent falls R29.6    Double vision H53.2    Muscle soreness M79.10    Peptic ulcer K27.9    Melena K92.1    PUD (peptic ulcer disease) K27.9    Absolute anemia D64.9    Acute postoperative anemia due to expected blood loss D62    Acute renal failure (HCC) N17.9    Clostridium difficile infection A49.8    Acquired spondylolisthesis M43.10    Spinal stenosis of lumbar region with neurogenic claudication M48.062    Acute deep vein thrombosis (DVT) of proximal vein of left lower extremity ([facility]) I82.4Y2    Leg swelling M79.89    Back pain M54.9    Neurological disorder G98.8    Closed unstable burst fracture of fifth lumbar vertebra with routine healing S32.052D    Slow transit constipation K59.01    Major depressive disorder, recurrent, moderate ([facility]) F33.1    Acute deep vein thrombosis (DVT) of femoral vein of left lower extremity ([facility]) I82.412    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[facility]) 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, G99.2    Acute deep vein thrombosis (DVT) of right femoral vein ([facility]) I82.411    S/P cervical spinal fusion Z98.1    Gait instability R26.81    Cervical myelopathy ([facility]) G95.9    Cellulitis of both lower extremities L03.115, L03.116    Fracture of body of sternum, initial encounter for open fracture S22.22XB    Nondisplaced fracture of sternal end of left clavicle, initial encounter for closed fracture S42.018A    Erysipelas of right lower extremity A46    Closed fracture of body of sternum S22.22XA    Calculus of gallbladder without cholecystitis without obstruction K80.20    Leukocytosis D72.829    Type 2 diabetes mellitus with stage 3 chronic kidney disease (HCC) E11.22, N18.30    Allergy to sulfa drugs Z88.2    Bilateral be able to tolerate at least 3 hours of therapy per day over 5 days or 15 hours over 7 days. [x] Further therapy recommended at discharge. [] No therapy recommended at discharge. Treatment completed by:  Peyman Garcia, LIANG, M.S. Michelle Chacon

## 2023-08-02 NOTE — PLAN OF CARE
Problem: Discharge Planning  Goal: Discharge to home or other facility with appropriate resources  8/2/2023 1838 by Leelee Negrete RN  Outcome: Progressing  Flowsheets (Taken 8/2/2023 0800)  Discharge to home or other facility with appropriate resources:   Identify barriers to discharge with patient and caregiver   Identify discharge learning needs (meds, wound care, etc)   Arrange for needed discharge resources and transportation as appropriate  8/2/2023 0527 by Isa Barron RN  Outcome: Progressing     Problem: Chronic Conditions and Co-morbidities  Goal: Patient's chronic conditions and co-morbidity symptoms are monitored and maintained or improved  8/2/2023 1838 by Leelee Negrete RN  Outcome: Progressing  Flowsheets (Taken 8/2/2023 0800)  Care Plan - Patient's Chronic Conditions and Co-Morbidity Symptoms are Monitored and Maintained or Improved:   Monitor and assess patient's chronic conditions and comorbid symptoms for stability, deterioration, or improvement   Collaborate with multidisciplinary team to address chronic and comorbid conditions and prevent exacerbation or deterioration   Update acute care plan with appropriate goals if chronic or comorbid symptoms are exacerbated and prevent overall improvement and discharge  8/2/2023 0527 by Isa Barron RN  Outcome: Progressing     Problem: Pain  Goal: Verbalizes/displays adequate comfort level or baseline comfort level  8/2/2023 1838 by Leelee Negrete RN  Outcome: Progressing  8/2/2023 0527 by Isa Barron RN  Outcome: Progressing     Problem: Safety - Adult  Goal: Free from fall injury  8/2/2023 1838 by Leelee Negrete RN  Outcome: Progressing  [address] (Taken 8/2/2023 0727)  Free From Fall Injury: Instruct family/caregiver on patient safety  8/2/2023 0527 by Isa Barron RN  Outcome: Progressing     Problem: Respiratory - Adult  Goal: Achieves optimal ventilation and oxygenation  8/2/2023 1838 by Leelee Negrete RN  Outcome: Progressing  Flowsheets (Taken 8/2/2023 0800)  Achieves optimal ventilation and oxygenation:   Assess for changes in respiratory status   Assess for changes in mentation and behavior   Position to facilitate oxygenation and minimize respiratory effort   Oxygen supplementation based on oxygen saturation or arterial blood gases  8/2/2023 0527 by Natalio Diaz RN  Outcome: Progressing     Problem: Skin/Tissue Integrity  Goal: Absence of new skin breakdown  Description: 1. Monitor for areas of redness and/or skin breakdown  2. Assess vascular access sites hourly  3. Every 4-6 hours minimum:  Change oxygen saturation probe site  4. Every 4-6 hours:  If on nasal continuous positive airway pressure, respiratory therapy assess nares and determine need for appliance change or resting period.   8/2/2023 1838 by Kamar Waddell RN  Outcome: Progressing  8/2/2023 0527 by Natalio Diaz RN  Outcome: Progressing     Problem: Nutrition Deficit:  Goal: Optimize nutritional status  8/2/2023 1838 by Kamar Waddell RN  Outcome: Progressing  8/2/2023 0527 by Natalio Diaz RN  Outcome: Progressing     Problem: ABCDS Injury Assessment  Goal: Absence of physical injury  8/2/2023 1838 by Kamar Waddell RN  Outcome: Claudean Nirmal  8/2/2023 0527 by Natalio Diaz RN  Outcome: Progressing

## 2023-08-02 NOTE — CARE COORDINATION
IPR authorization to Kimmy Esposito still listed as pending in [place]. CM uploaded all clinical documentation to pending precert. 1550- Call received from [facility] with [place] stating that they are requesting peer to peer. Peer to peer to be completed by 10am tomorrow by calling [phone] and selecting option 5 and using patient's member ID # [id number]. PS message sent to Dr. Jaky Ralph and she is agreeable to completing peer to peer. Lionel with [facility].

## 2023-08-02 NOTE — PROGRESS NOTES
[facility]  Office: [address], DO, Balbina Rodríguezter, DO, Hardik Thorpe, DO, [facility] Blood, DO, Karissa Spears MD, Sotero Sandhu MD, Rashad Todd MD, Joann Wise MD,  Mauri Childress MD, Alexandria Thibodeaux MD, Rhys Mclain, DO, Jeremi Gonzales MD,  Chastity Craft MD, Mirella Evans MD, Dilia Best, DO, Gabino Giraldo MD,  Diandra Lama, DO, Girish Elmore MD, Shawn Dean MD, Jayson Mora MD, Jewell Self MD,  Ajay Howell MD, Duane Campanile, MD, Christ Bear, DO, Krys Anderson MD,  Sloane Bianchi MD, Demi Meredith, CNP,  Seymour Calvert, CNP, Don Alexandra, CNP, Ashwin Bob, CNP,  Jam Quiroz, DELMY, Ryanne Guzmán, CNP, Donnie Goyal, CNP, Jose Rafael Abad, CNP, Lynne Mclean, CNP, Leela Garza, CNP, Percy Tijerina PA-C, Ty Street, VERONICA, Alex Clayton, CHRISTOS, Rose Mary Payne    Progress Note    8/2/2023    11:32 AM    Name:   Rona Lundberg  MRN:     [id number]     Acct:      [de-identified]   Room:   [address] [place] [address] Day:  7  Admit Date:  7/26/2023  5:45 AM    PCP:   Kiet Joseph MD  Code Status:  Full Code    Subjective:     C/C:   Neck pain and stiffness  Cervical spondylosis    Interval History Status:   POD 7  Improved  Incisional pain better controlled, rated  7/10  Chief complaint at this time is sore throat from NG tube  She is hungry and hopes to eat some  She has been up to the chair    Data Base Updates:  Patient has been in negative fluid balance    WBC8.0k/uL RBC2.42 Low m/uL Hemoglobin8. 8 Low      [id number] High mg/dL     BUN28 High mg/dL   Creatinine0.8     Brief History:     As documented in the medical record:  \"Per ICU course:   Patient initially presented for ANTERIOR CERVICAL DISCECTOMY, FUSION  C6-7; POSTERIOR CERVICAL LAMINECTOMY C1-2, CERVICAL FUSION C2-T2, after continued evaluations for neck pain, weakness and inability to hold her neck easily.   Per 08/02/23  0525   WBC 5.8 6.8 8.0   RBC 2.58* 2.97* 2.42*   HGB 8.8* 9.2* 8.8*   HCT 26.0* 29.9* 24.4*   .8 100.7 100.8   MCH 34.1* 31.0 36.4*   MCHC 33.8 30.8 36.1*   RDW 13.6 13.2 13.5    231 530*   MPV 10.6 9.4 10.4     Chemistry:  Recent Labs     07/31/23  0700 08/01/23  0533 08/02/23  0525    140 135   K 3.9 3.9 4.2    102 98   CO2 27 26 28   GLUCOSE 135* 131* 106*   BUN 14 15 28*   CREATININE 0.5 0.6 0.8   ANIONGAP 8* 12 9   LABGLOM >60 >60 >60   CALCIUM 8.2* 8.6 8.9   No results for input(s): PROT, LABALBU, LABA1C, P3AAGAT, K4HVTBI, FT4, TSH, AST, ALT, LDH, GGT, ALKPHOS, LABGGT, BILITOT, BILIDIR, AMMONIA, AMYLASE, LIPASE, LACTATE, CHOL, HDL, LDLCHOLESTEROL, CHOLHDLRATIO, TRIG, VLDL, [id number], PHENYTOIN, PHENYF, URICACID, POCGLU in the last 72 hours. ABG:  Lab Results   Component Value Date/Time    POCPH 7.363 07/28/2023 04:03 AM    PHART 7.312 07/26/2023 05:02 PM    POCPCO2 38.7 07/28/2023 04:03 AM    AZU4LVS 38.7 07/26/2023 05:02 PM    POCPO2 86.8 07/28/2023 04:03 AM    PO2ART 223.0 07/26/2023 05:02 PM    POCHCO3 22.0 07/28/2023 04:03 AM    HHO2LLS 19.0 07/26/2023 05:02 PM    NBEA 3.1 07/28/2023 04:03 AM    JFBU4ZCS 96.3 07/28/2023 04:03 AM    I8MVQPAP 99.2 07/26/2023 05:02 PM    FIO2 28.0 07/28/2023 04:03 AM     Lab Results   Component Value Date/Time    SPECIAL NOT REPORTED 05/21/2021 09:58 AM     Lab Results   Component Value Date/Time    CULTURE NO GROWTH 3 DAYS 07/29/2023 10:04 AM       Radiology:  XR CHEST (SINGLE VIEW FRONTAL)    Result Date: 7/29/2023  New right infrahilar airspace opacity which may represent developing pneumonia in the appropriate clinical setting. Left basilar atelectasis. Radiographic follow-up recommended to document resolution following treatment in 6-8 weeks. XR CHEST (2 VW)    Result Date: 7/28/2023  Mild cardiomegaly. Mild left pleural effusion. CT CERVICAL SPINE WO CONTRAST    Result Date: 7/27/2023  Right C2 screw position as described.

## 2023-08-03 ENCOUNTER — APPOINTMENT (OUTPATIENT)
Dept: GENERAL RADIOLOGY | Age: 73
End: 2023-08-03
Attending: NEUROLOGICAL SURGERY
Payer: MEDICARE

## 2023-08-03 LAB
ANION GAP SERPL CALCULATED.3IONS-SCNC: 11 MMOL/L (ref 9–17)
BASOPHILS # BLD: 0.04 K/UL (ref 0–0.2)
BASOPHILS NFR BLD: 0 % (ref 0–2)
BUN SERPL-MCNC: 27 MG/DL (ref 8–23)
CALCIUM SERPL-MCNC: 8.9 MG/DL (ref 8.6–10.4)
CHLORIDE SERPL-SCNC: 98 MMOL/L (ref 98–107)
CO2 SERPL-SCNC: 28 MMOL/L (ref 20–31)
CREAT SERPL-MCNC: 0.7 MG/DL (ref 0.5–0.9)
EOSINOPHIL # BLD: 0.66 K/UL (ref 0–0.44)
EOSINOPHILS RELATIVE PERCENT: 7 % (ref 1–4)
ERYTHROCYTE [DISTWIDTH] IN BLOOD BY AUTOMATED COUNT: 12.8 % (ref 11.8–14.4)
GFR SERPL CREATININE-BSD FRML MDRD: >60 ML/MIN/1.73M2
GLUCOSE SERPL-MCNC: 104 MG/DL (ref 70–99)
HCT VFR BLD AUTO: 29.9 % (ref 36.3–47.1)
HGB BLD-MCNC: 9.2 G/DL (ref 11.9–15.1)
IMM GRANULOCYTES # BLD AUTO: 0.08 K/UL (ref 0–0.3)
IMM GRANULOCYTES NFR BLD: 1 %
LYMPHOCYTES NFR BLD: 1.21 K/UL (ref 1.1–3.7)
LYMPHOCYTES RELATIVE PERCENT: 14 % (ref 24–43)
MCH RBC QN AUTO: 31.4 PG (ref 25.2–33.5)
MCHC RBC AUTO-ENTMCNC: 30.8 G/DL (ref 28.4–34.8)
MCV RBC AUTO: 102 FL (ref 82.6–102.9)
MICROORGANISM SPEC CULT: NORMAL
MONOCYTES NFR BLD: 1.06 K/UL (ref 0.1–1.2)
MONOCYTES NFR BLD: 12 % (ref 3–12)
NEUTROPHILS NFR BLD: 66 % (ref 36–65)
NEUTS SEG NFR BLD: 5.88 K/UL (ref 1.5–8.1)
NRBC BLD-RTO: 0 PER 100 WBC
PLATELET # BLD AUTO: 291 K/UL (ref 138–453)
PMV BLD AUTO: 9.7 FL (ref 8.1–13.5)
POTASSIUM SERPL-SCNC: 4.4 MMOL/L (ref 3.7–5.3)
RBC # BLD AUTO: 2.93 M/UL (ref 3.95–5.11)
SERVICE CMNT-IMP: NORMAL
SODIUM SERPL-SCNC: 137 MMOL/L (ref 135–144)
SPECIMEN DESCRIPTION: NORMAL
WBC OTHER # BLD: 8.9 K/UL (ref 3.5–11.3)

## 2023-08-03 PROCEDURE — 99231 SBSQ HOSP IP/OBS SF/LOW 25: CPT | Performed by: INTERNAL MEDICINE

## 2023-08-03 PROCEDURE — 2580000003 HC RX 258

## 2023-08-03 PROCEDURE — 2580000003 HC RX 258: Performed by: STUDENT IN AN ORGANIZED HEALTH CARE EDUCATION/TRAINING PROGRAM

## 2023-08-03 PROCEDURE — 2060000000 HC ICU INTERMEDIATE R&B

## 2023-08-03 PROCEDURE — 97110 THERAPEUTIC EXERCISES: CPT

## 2023-08-03 PROCEDURE — 85025 COMPLETE CBC W/AUTO DIFF WBC: CPT

## 2023-08-03 PROCEDURE — 97530 THERAPEUTIC ACTIVITIES: CPT

## 2023-08-03 PROCEDURE — 74230 X-RAY XM SWLNG FUNCJ C+: CPT

## 2023-08-03 PROCEDURE — 6370000000 HC RX 637 (ALT 250 FOR IP)

## 2023-08-03 PROCEDURE — 6360000002 HC RX W HCPCS: Performed by: STUDENT IN AN ORGANIZED HEALTH CARE EDUCATION/TRAINING PROGRAM

## 2023-08-03 PROCEDURE — 92526 ORAL FUNCTION THERAPY: CPT

## 2023-08-03 PROCEDURE — 6370000000 HC RX 637 (ALT 250 FOR IP): Performed by: NURSE PRACTITIONER

## 2023-08-03 PROCEDURE — 99232 SBSQ HOSP IP/OBS MODERATE 35: CPT | Performed by: PHYSICAL MEDICINE & REHABILITATION

## 2023-08-03 PROCEDURE — 80048 BASIC METABOLIC PNL TOTAL CA: CPT

## 2023-08-03 PROCEDURE — 97535 SELF CARE MNGMENT TRAINING: CPT

## 2023-08-03 PROCEDURE — 36415 COLL VENOUS BLD VENIPUNCTURE: CPT

## 2023-08-03 PROCEDURE — 92611 MOTION FLUOROSCOPY/SWALLOW: CPT

## 2023-08-03 RX ADMIN — SODIUM CHLORIDE, PRESERVATIVE FREE 10 ML: 5 INJECTION INTRAVENOUS at 08:30

## 2023-08-03 RX ADMIN — CARVEDILOL 12.5 MG: 12.5 TABLET, FILM COATED ORAL at 08:33

## 2023-08-03 RX ADMIN — SODIUM CHLORIDE, PRESERVATIVE FREE 10 ML: 5 INJECTION INTRAVENOUS at 20:40

## 2023-08-03 RX ADMIN — FUROSEMIDE 40 MG: 40 TABLET ORAL at 20:40

## 2023-08-03 RX ADMIN — GABAPENTIN 100 MG: 100 CAPSULE ORAL at 20:40

## 2023-08-03 RX ADMIN — CITALOPRAM 20 MG: 20 TABLET, FILM COATED ORAL at 08:33

## 2023-08-03 RX ADMIN — CARVEDILOL 12.5 MG: 12.5 TABLET, FILM COATED ORAL at 17:24

## 2023-08-03 RX ADMIN — LANSOPRAZOLE 15 MG: 15 TABLET, ORALLY DISINTEGRATING, DELAYED RELEASE ORAL at 09:28

## 2023-08-03 RX ADMIN — OXYCODONE HYDROCHLORIDE 10 MG: 5 TABLET ORAL at 20:39

## 2023-08-03 RX ADMIN — ENOXAPARIN SODIUM 40 MG: 100 INJECTION SUBCUTANEOUS at 08:40

## 2023-08-03 RX ADMIN — FUROSEMIDE 40 MG: 40 TABLET ORAL at 08:33

## 2023-08-03 RX ADMIN — SODIUM CHLORIDE, PRESERVATIVE FREE 10 ML: 5 INJECTION INTRAVENOUS at 12:41

## 2023-08-03 RX ADMIN — POLYETHYLENE GLYCOL 3350 17 G: 17 POWDER, FOR SOLUTION ORAL at 08:40

## 2023-08-03 RX ADMIN — BUSPIRONE HYDROCHLORIDE 5 MG: 5 TABLET ORAL at 08:32

## 2023-08-03 RX ADMIN — AMOXICILLIN AND CLAVULANATE POTASSIUM 1 TABLET: 875; 125 TABLET, FILM COATED ORAL at 08:31

## 2023-08-03 RX ADMIN — BUSPIRONE HYDROCHLORIDE 5 MG: 5 TABLET ORAL at 20:40

## 2023-08-03 RX ADMIN — GABAPENTIN 100 MG: 100 CAPSULE ORAL at 08:35

## 2023-08-03 RX ADMIN — OXYCODONE HYDROCHLORIDE 10 MG: 5 TABLET ORAL at 08:38

## 2023-08-03 RX ADMIN — HYDROXYZINE HYDROCHLORIDE 25 MG: 25 TABLET ORAL at 20:40

## 2023-08-03 RX ADMIN — BUSPIRONE HYDROCHLORIDE 5 MG: 5 TABLET ORAL at 14:02

## 2023-08-03 RX ADMIN — DOCUSATE SODIUM 100 MG: 50 LIQUID ORAL at 08:39

## 2023-08-03 ASSESSMENT — PAIN SCALES - GENERAL
PAINLEVEL_OUTOF10: 2
PAINLEVEL_OUTOF10: 7
PAINLEVEL_OUTOF10: 2
PAINLEVEL_OUTOF10: 7

## 2023-08-03 ASSESSMENT — ENCOUNTER SYMPTOMS
BACK PAIN: 1
COUGH: 0
NAUSEA: 0
VOMITING: 0
SHORTNESS OF BREATH: 0
SORE THROAT: 0
ABDOMINAL PAIN: 0

## 2023-08-03 ASSESSMENT — PAIN DESCRIPTION - FREQUENCY: FREQUENCY: CONTINUOUS

## 2023-08-03 ASSESSMENT — PAIN DESCRIPTION - PAIN TYPE: TYPE: CHRONIC PAIN

## 2023-08-03 ASSESSMENT — PAIN DESCRIPTION - LOCATION
LOCATION: INCISION
LOCATION: NECK
LOCATION: NECK

## 2023-08-03 ASSESSMENT — PAIN DESCRIPTION - ORIENTATION
ORIENTATION: UPPER
ORIENTATION: UPPER

## 2023-08-03 ASSESSMENT — PAIN DESCRIPTION - ONSET: ONSET: ON-GOING

## 2023-08-03 ASSESSMENT — PAIN - FUNCTIONAL ASSESSMENT: PAIN_FUNCTIONAL_ASSESSMENT: ACTIVITIES ARE NOT PREVENTED

## 2023-08-03 ASSESSMENT — PAIN DESCRIPTION - DESCRIPTORS
DESCRIPTORS: ACHING;BURNING
DESCRIPTORS: ACHING;BURNING

## 2023-08-03 NOTE — PROGRESS NOTES
Occupational Therapy  Facility/Department: [address] STEPDOWN  Occupational Therapy Daily Treatment Note    Name: Tony Rosas  : 1950  MRN: [id number]  Date of Service: 8/3/2023    Discharge Recommendations:  Further therapy recommended at discharge. The patient should be able to tolerate at least 3 hours of therapy per day over 5 days or 15 hours over 7 days. This patient may benefit from a Physical Medicine and Rehab consult. Patient Diagnosis(es): Diagnoses of Cervical spondylosis and Kyphosis of thoracic region, unspecified kyphosis type were pertinent to this visit. Past Medical History:  has a past medical history of Allergic rhinitis, cause unspecified, Back pain, Bowel obstruction (HCC), C. difficile diarrhea, CAD (coronary artery disease), Cardiac murmur, Cellulitis, Cerebral artery occlusion with cerebral infarction ([address]), COVID-19, Diverticulosis of colon (without mention of hemorrhage), GERD (gastroesophageal reflux disease), History of blood transfusion, History of CHF (congestive heart failure), History of MI (myocardial infarction), History of ovarian cyst, History of peritonitis, HTN (hypertension), Hx of blood clots, Hyperlipidemia, Intestinal or peritoneal adhesions with obstruction (postoperative) (postinfection) ([address]), Kidney infection, Lateral epicondylitis  of elbow, MDRO (multiple drug resistant organisms) resistance, Muscle strain, Other abnormal glucose, PONV (postoperative nausea and vomiting), Pre-diabetes, Restless legs syndrome (RLS), Snores, 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[address]), TIA (transient ischemic attack), Under care of service provider, Under care of service provider, Uses walker, Vitamin D deficiency, Wears glasses, and Wellness examination. Past Surgical History:  has a past surgical history that includes Ovary removal (); colectomy (); Appendectomy (); Ovary removal (); Hysterectomy ();  Bunionectomy (Left); sinus surgery ();

## 2023-08-03 NOTE — PLAN OF CARE
Problem: Discharge Planning  Goal: Discharge to home or other facility with appropriate resources  8/3/2023 0616 by Rubio Wright RN  Outcome: Progressing  Flowsheets (Taken 8/2/2023 2000)  Discharge to home or other facility with appropriate resources: Identify barriers to discharge with patient and caregiver  8/2/2023 1838 by Cassius Donovan RN  Outcome: Progressing  Flowsheets (Taken 8/2/2023 0800)  Discharge to home or other facility with appropriate resources:   Identify barriers to discharge with patient and caregiver   Identify discharge learning needs (meds, wound care, etc)   Arrange for needed discharge resources and transportation as appropriate     Problem: Chronic Conditions and Co-morbidities  Goal: Patient's chronic conditions and co-morbidity symptoms are monitored and maintained or improved  8/3/2023 0616 by Rubio Wright RN  Outcome: Progressing  Flowsheets (Taken 8/2/2023 2000)  Care Plan - Patient's Chronic Conditions and Co-Morbidity Symptoms are Monitored and Maintained or Improved: Monitor and assess patient's chronic conditions and comorbid symptoms for stability, deterioration, or improvement  8/2/2023 1838 by Cassius Donovan RN  Outcome: Progressing  Flowsheets (Taken 8/2/2023 0800)  Care Plan - Patient's Chronic Conditions and Co-Morbidity Symptoms are Monitored and Maintained or Improved:   Monitor and assess patient's chronic conditions and comorbid symptoms for stability, deterioration, or improvement   Collaborate with multidisciplinary team to address chronic and comorbid conditions and prevent exacerbation or deterioration   Update acute care plan with appropriate goals if chronic or comorbid symptoms are exacerbated and prevent overall improvement and discharge     Problem: Pain  Goal: Verbalizes/displays adequate comfort level or baseline comfort level  8/3/2023 0616 by Rubio Wright RN  Outcome: Progressing  8/2/2023 1838 by Cassius Donovan RN  Outcome: Injury: Implement safety measures based on patient assessment  8/2/2023 1838 by Mahesh Allison, RN  Outcome: Progressing

## 2023-08-03 NOTE — PROGRESS NOTES
Physical Medicine & Rehabilitation  Progress Note        Admitting Physician: Bran Faye DO    Primary Care Provider: Essence Escobar MD     Chief Complaint: Neck pain, falls    Brief History: This is a follow up to the initial consult on Ms. Norris Lindquist is a 68 y.o. right handed female who was admitted to [facility] on 7/26/2023 with No chief complaint on file. Patient s/p scheduled cervical decompression/fusion with hardware exchange. This was performed by Dr. Luisa Boswell 7/26/23 with ACDF C6-7, anterior osteotomy 6-7 with correction of deformity, removal of previous hardware and segmental fixation/fusion from C2-T2. She is continuing to note some issues with coughing. This can occur with drinking through a straw but occurs at other times as well. Repeat swallow study with SLP today. She has completed Augmentin course. She remains on scopolamine and oxygen. Subjective: The patient is resting comfortably. The patient's mobility is unchanged. Pain is responding to prn medications. She has external catheter in place but was continent prior to admission.        ROS:  Constitutional: negative for anorexia, chills, fatigue, fevers, sweats and weight loss  Eyes: negative for redness and visual disturbance  Ears, nose, mouth, throat, and face: negative for earaches, epistaxis, sore throat and tinnitus  Respiratory: positive for cough and negative for shortness of breath  Cardiovascular: negative for chest pain, dyspnea and palpitations  Gastrointestinal: negative for abdominal pain, change in bowel habits, constipation, nausea and vomiting  Genitourinary:negative for dysuria, frequency, hesitancy and urinary incontinence - currently using external catheter  Integument/breast: negative for pruritus and rash  Musculoskeletal:negative for stiff joints  Neurological: negative for dizziness, headaches   Behavioral/Psych: negative for decreased appetite, depression     Rehabilitation:   Progressing in therapies. PT:      Bed Mobility-  Bed mobility  Supine to Sit: Moderate assistance;2 Person assistance  Sit to Supine: Unable to assess (Pt retired to chair at end of session)  Scooting: Maximal assistance  Bed Mobility Comments: HOB elevated, increased time/effort required to perform, required assistance with trunk and LE progression throughout. Transfers-  Transfers  Sit to Stand: Maximum Assistance;2 Person Assistance  Stand to Sit: Maximum Assistance;2 Person Assistance  Bed to Chair: Dependent/Total (Pt transferred to toilet, then to chair in [facility].)  Comment: Pt performed multiple transfers throughout session. Pt initially stood from bed (x2 trials) with use of RW requiring maxA x2 and also cueing for proper hand/foot placement. Pt required blocking of B feet throughout transfers. Pt with a posterior lean when standing wiht RW, difficulty correcting and unable to advance LEs to attempt gait. Pt performed additional transfers in [facility] requiring maxA with max cueing for hand/foot placement throughout. Ambulation-  Ambulation  Comments: Pt unable to progress bilateral LE for ambulation this date, pt demonstrating significant difficulty with clearance of RLE. More Ambulation?: No       OT:   ADLS-   ADL  Feeding: NPO (NG Tube Feed)  Feeding Skilled Clinical Factors: max A to bring cup to mouth to drink. Pt coughing up thin liquid. RN and speech therapist notified-pt previously passed bedside swallow yesterday; plan to now have video swallow study this AM to assess further. Grooming: Verbal cueing, Increased time to complete, Maximum assistance, Setup (assist to lift BUE to face to wash face and was unable to lift to brush hair)  Grooming Skilled Clinical Factors: assist needed this date to wipe eye with tissue in RUE. Pt requires additional time and RUE supported throughout motion to complete.  Pt unable to reach out to grasp tissue, needing placed into hand  UE Bathing: Verbal cueing,

## 2023-08-03 NOTE — CARE COORDINATION
Received  [employer] Portal Correspondence from [employer]     Patient authorized for admission to Acute Inpatient Rehabilitation Stay under Auth/CaseRef# [id number]     Patient approved for 11 days for Acute Inpatient Rehabilitation level of care: 8/3/23-8/14/23    Authorization valid for admission for the following timeframe: Not listed    Next Review date: 8/14/23   Continued stay clinical documentation to be submitted via: Online Portal: [employer]    Updated Pau Pike CM: Via Telephone Conversation at this time at phone/extension: [phone]    Pt has order for MBSS to be completed today. Awaiting completion.

## 2023-08-03 NOTE — CARE COORDINATION
[address] [place] [address]  PRE-ADMISSION ASSESSMENT    Patient Name: Eda Hernandez        MRN: [id number]    : 1950  (68 y.o.)  Gender: female   Ethnicity: Not , /a or Salvadorean Origin  Race: White    Admitted from:  [address] [place] [address]     Type of Admission:  New Admission     Date of Onset / Admission to the [facility] [address]:  23    Inpatient Rehabilitation Admitting Diagnosis:  Cervical stenosis s/p decompression/fusion    Did patient have surgery/procedures? Yes, As Listed Below   MODIFIED BARIUM SWALLOW 23  2. ANTERIOR CERVICAL DISCECTOMY, OSTEOTOMY, FUSION,  C6-7, CORRECTION OF DEFORMITY. 23  3. POSTERIOR CERVICAL OSTEOTOMY C6-7, REMOVAL OF PREVIOUS HARDWARE, CERVICAL FUSION C2-T2, CORRECTION OF DEFORMITY. 23    Physicians:   Angelic Chappell,   Physician  Neurology    [facility]  Physician  Internal Medicine    Pravin Curtis MD  Physician  Internal Medicine    Vipin Reyes MD  Physician  Neuro-critical Care    Bernadine Joiner MD  Physician  Neuro-critical Care    Risk for Clinical Complications: Moderate     Co-morbidities:    Cervical spondylolisthesis/stenosis: s/p ACDF C6-7 with additional segmental fusion C2-T2.  On Tylenol, gabapentin, oxycodone for pain  Dysphagia: NG tube in place, cleared for pureed diet with mildly thick liquids   HTN: on carvedilol  CHF: on Lasix  CAD  Anxiety: on hydroxyzine prn, on celexa, buspirone  Pneumonia: On Augmentin x5 days until 8/3, on albuterol and atrovent prn, on 2L NC    Financial Information  Primary insurance: Medicare HMO: [employer] Medicare      Secondary Insurance: Commercial Insurance:[facility]      Precautions:   []Cardiac Precautions: No Cardiac Precautions  []Total hip precautions: No Total Hip Precautions  []Weight Bearing status: No Weight Bearing Restrictions  [x]Safety Precautions/Concerns:  Fall Risk, General Precautions, HOB 30 degrees or higher; s/p ACDF C6-C7 results of the pre-admission screening assessment completed by the Inpatient Rehabilitation Admissions Coordinator.

## 2023-08-03 NOTE — CARE COORDINATION
Transitional Planning   Call from Adry at [facility], Juan obtained. Auth ID [id number]. [facility] [id number]. Good for 12 days, 8-3 to 8-14th. Called Melita SOSA CM, update given.

## 2023-08-03 NOTE — CARE COORDINATION
Transitional planning: [facility] requested [facility] time.

## 2023-08-03 NOTE — PROCEDURES
INSTRUMENTAL SWALLOW REPORT  MODIFIED BARIUM SWALLOW    NAME: Rene Rivera   : 1950  MRN: [id number]       Date of Eval: 8/3/2023              Referring Diagnosis(es):      Past Medical History:  has a past medical history of Allergic rhinitis, cause unspecified, Back pain, Bowel obstruction ([address]), C. difficile diarrhea, CAD (coronary artery disease), Cardiac murmur, Cellulitis, Cerebral artery occlusion with cerebral infarction ([address]), COVID-19, Diverticulosis of colon (without mention of hemorrhage), GERD (gastroesophageal reflux disease), History of blood transfusion, History of CHF (congestive heart failure), History of MI (myocardial infarction), History of ovarian cyst, History of peritonitis, HTN (hypertension), Hx of blood clots, Hyperlipidemia, Intestinal or peritoneal adhesions with obstruction (postoperative) (postinfection) ([address]), Kidney infection, Lateral epicondylitis  of elbow, MDRO (multiple drug resistant organisms) resistance, Muscle strain, Other abnormal glucose, PONV (postoperative nausea and vomiting), Pre-diabetes, Restless legs syndrome (RLS), Snores, 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[address]), TIA (transient ischemic attack), Under care of service provider, Under care of service provider, Uses walker, Vitamin D deficiency, Wears glasses, and Wellness examination. Past Surgical History:  has a past surgical history that includes Ovary removal (); colectomy (); Appendectomy (); Ovary removal (); Hysterectomy (); Bunionectomy (Left); sinus surgery (); Colonoscopy; other surgical history (2014); cyst removal (Right); Wrist fracture surgery (Left, 2019); Upper gastrointestinal endoscopy (N/A, 2019); Upper gastrointestinal endoscopy (N/A, 2019); Upper gastrointestinal endoscopy (N/A, 2019); Abdomen surgery (); lumbar fusion (N/A, 02/10/2020); Cardiac catheterization ();  [place] tooth extraction; lumbar fusion (N/A,

## 2023-08-03 NOTE — CARE COORDINATION
Auth received from [place]. Approved for ARU level of care 8/3 to 8/14.  Susan Torres [id number]  Hai Marcos [id number]    [facility] [employer] notified

## 2023-08-03 NOTE — PROGRESS NOTES
[facility]  Office: [id number], DO, Jazzmine Brambila, DO, Yrn Santaigo, DO, Hoang Ritter Blood, DO, Vince Abad MD, Yves Cerda MD, Molly Bahena MD, Santos Min MD,  Arash Peralta MD, Ramesh Lau MD, Km Cantu, DO, Audi Ricks MD,  Irene Pat MD, Jeremy Corona MD, Vida Spann DO, Vicky Nelson MD,  Zacarias Paez DO, Behzad Luo MD, Alvino Andujar MD, Herson Rios MD, Adrienne Clark MD,  Tian Patel MD, Marcus Matthews MD, Kenia Doran DO, Bharat Ray MD,  Kelsie Hubbard MD, Henny Kohli, CNP,  Jazzmine Cam, CNP, Andrea Em, CNP, Dayron Gonsales, CNP,  Ricardo De La Garza DNP, Bindu Oro, CNP, Wilber Rhodes, CNP, Khushi Head, CNP, Flavia Sanchez, CNP, Cale Galvez, CNP, Emily Villanueva PA-C, Celestina Sheldon, VERONICA, Charles Guzmán, CHRISTOS, Selene Jhaveri [facility]    Progress Note    8/3/2023    9:59 AM    Name:   Colin Fuentes  MRN:     [id number]     Acct:      [de-identified]   Room:   [address] [place] [address] Day:  8  Admit Date:  7/26/2023  5:45 AM    PCP:   Carol Bustamante MD  Code Status:  Full Code    Subjective:     C/C:   Neck pain and stiffness  Cervical spondylosis    Interval History Status:   POD 8  Improved  Incisional pain better controlled, rated  6/10  NG tube removed  Diet resume    Data Base Updates:  WBC8.9k/uL RBC2.93 Low m/uL Hemoglobin9. 2 Low      [place] High mg/dL     BUN27 High mg/dL Creatinine0.7     Brief History:     As documented in the medical record:  \"Per ICU course:   Patient initially presented for ANTERIOR CERVICAL DISCECTOMY, FUSION  C6-7; POSTERIOR CERVICAL LAMINECTOMY C1-2, CERVICAL FUSION C2-T2, after continued evaluations for neck pain, weakness and inability to hold her neck easily. Per patient she also had upper extremity weakness on the left greater than the right. Surgical procedure was done on 7/26/2023.   Postoperatively she

## 2023-08-03 NOTE — PROGRESS NOTES
Physical Therapy  Facility/Department: [address] STE[address]OWN  Physical Therapy Daily Treatment Note    Name: Carlota Rivers  : 1950  MRN: [id number]  Date of Service: 8/3/2023    Discharge Recommendations:  Patient would benefit from continued therapy after discharge   PT Equipment Recommendations  Equipment Needed: No      Patient Diagnosis(es): Diagnoses of Cervical spondylosis and Kyphosis of thoracic region, unspecified kyphosis type were pertinent to this visit. Past Medical History:  has a past medical history of Allergic rhinitis, cause unspecified, Back pain, Bowel obstruction (HCC), C. difficile diarrhea, CAD (coronary artery disease), Cardiac murmur, Cellulitis, Cerebral artery occlusion with cerebral infarction ([address]), COVID-19, Diverticulosis of colon (without mention of hemorrhage), GERD (gastroesophageal reflux disease), History of blood transfusion, History of CHF (congestive heart failure), History of MI (myocardial infarction), History of ovarian cyst, History of peritonitis, HTN (hypertension), Hx of blood clots, Hyperlipidemia, Intestinal or peritoneal adhesions with obstruction (postoperative) (postinfection) ([address]), Kidney infection, Lateral epicondylitis  of elbow, MDRO (multiple drug resistant organisms) resistance, Muscle strain, Other abnormal glucose, PONV (postoperative nausea and vomiting), Pre-diabetes, Restless legs syndrome (RLS), Snores, 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[address]), TIA (transient ischemic attack), Under care of service provider, Under care of service provider, Uses walker, Vitamin D deficiency, Wears glasses, and Wellness examination. Past Surgical History:  has a past surgical history that includes Ovary removal (); colectomy (); Appendectomy (); Ovary removal (); Hysterectomy (); Bunionectomy (Left); sinus surgery (); Colonoscopy; other surgical history (2014); cyst removal (Right); Wrist fracture surgery (Left, 2019);  Upper Therapy;Plan of Care;Transfer Training; Fall Prevention Strategies;Precautions  Education Method: Verbal;Demonstration  Barriers to Learning: None  Education Outcome: Verbalized understanding;Continued education needed      Therapy Time   Individual Concurrent Group Co-treatment   Time In 1113         Time Out 1150         Minutes 37         Timed Code Treatment Minutes: AMNA Taveras

## 2023-08-03 NOTE — PLAN OF CARE
Problem: Safety - Adult  Goal: Free from fall injury  8/3/2023 1639 by Nba Dotson RN  Outcome: Progressing  8/3/2023 0616 by [place] Ch[place] RN  Outcome: Progressing  Flowsheets (Taken 8/2/2023 2000)  Free From Fall Injury: Instruct family/caregiver on patient safety

## 2023-08-04 ENCOUNTER — HOSPITAL ENCOUNTER (INPATIENT)
Age: 73
LOS: 14 days | Discharge: NURSING, REHAB OR LONG-TERM CARE | End: 2023-08-18
Attending: PHYSICAL MEDICINE & REHABILITATION | Admitting: PHYSICAL MEDICINE & REHABILITATION
Payer: MEDICARE

## 2023-08-04 VITALS
WEIGHT: 236.99 LBS | TEMPERATURE: 97.6 F | BODY MASS INDEX: 35.1 KG/M2 | HEART RATE: 59 BPM | OXYGEN SATURATION: 97 % | SYSTOLIC BLOOD PRESSURE: 160 MMHG | DIASTOLIC BLOOD PRESSURE: 123 MMHG | RESPIRATION RATE: 16 BRPM | HEIGHT: 69 IN

## 2023-08-04 DIAGNOSIS — M48.02 SPINAL STENOSIS IN CERVICAL REGION: Primary | ICD-10-CM

## 2023-08-04 LAB
ANION GAP SERPL CALCULATED.3IONS-SCNC: 12 MMOL/L (ref 9–17)
BASOPHILS # BLD: 0.07 K/UL (ref 0–0.2)
BASOPHILS NFR BLD: 1 % (ref 0–2)
BUN SERPL-MCNC: 27 MG/DL (ref 8–23)
CALCIUM SERPL-MCNC: 9.2 MG/DL (ref 8.6–10.4)
CHLORIDE SERPL-SCNC: 98 MMOL/L (ref 98–107)
CO2 SERPL-SCNC: 31 MMOL/L (ref 20–31)
CREAT SERPL-MCNC: 0.8 MG/DL (ref 0.5–0.9)
EOSINOPHIL # BLD: 0.57 K/UL (ref 0–0.44)
EOSINOPHILS RELATIVE PERCENT: 8 % (ref 1–4)
ERYTHROCYTE [DISTWIDTH] IN BLOOD BY AUTOMATED COUNT: 12.7 % (ref 11.8–14.4)
GFR SERPL CREATININE-BSD FRML MDRD: >60 ML/MIN/1.73M2
GLUCOSE SERPL-MCNC: 105 MG/DL (ref 70–99)
HCT VFR BLD AUTO: 31.6 % (ref 36.3–47.1)
HGB BLD-MCNC: 9.9 G/DL (ref 11.9–15.1)
IMM GRANULOCYTES # BLD AUTO: 0.07 K/UL (ref 0–0.3)
IMM GRANULOCYTES NFR BLD: 1 %
LYMPHOCYTES NFR BLD: 1.47 K/UL (ref 1.1–3.7)
LYMPHOCYTES RELATIVE PERCENT: 21 % (ref 24–43)
MCH RBC QN AUTO: 31.8 PG (ref 25.2–33.5)
MCHC RBC AUTO-ENTMCNC: 31.3 G/DL (ref 28.4–34.8)
MCV RBC AUTO: 101.6 FL (ref 82.6–102.9)
MONOCYTES NFR BLD: 0.92 K/UL (ref 0.1–1.2)
MONOCYTES NFR BLD: 13 % (ref 3–12)
NEUTROPHILS NFR BLD: 56 % (ref 36–65)
NEUTS SEG NFR BLD: 3.91 K/UL (ref 1.5–8.1)
NRBC BLD-RTO: 0 PER 100 WBC
PLATELET # BLD AUTO: 334 K/UL (ref 138–453)
PMV BLD AUTO: 9.4 FL (ref 8.1–13.5)
POTASSIUM SERPL-SCNC: 4 MMOL/L (ref 3.7–5.3)
RBC # BLD AUTO: 3.11 M/UL (ref 3.95–5.11)
SODIUM SERPL-SCNC: 141 MMOL/L (ref 135–144)
WBC OTHER # BLD: 7 K/UL (ref 3.5–11.3)

## 2023-08-04 PROCEDURE — 1180000000 HC REHAB R&B

## 2023-08-04 PROCEDURE — 6370000000 HC RX 637 (ALT 250 FOR IP): Performed by: NURSE PRACTITIONER

## 2023-08-04 PROCEDURE — 97162 PT EVAL MOD COMPLEX 30 MIN: CPT

## 2023-08-04 PROCEDURE — 80048 BASIC METABOLIC PNL TOTAL CA: CPT

## 2023-08-04 PROCEDURE — 2580000003 HC RX 258: Performed by: STUDENT IN AN ORGANIZED HEALTH CARE EDUCATION/TRAINING PROGRAM

## 2023-08-04 PROCEDURE — 94667 MNPJ CHEST WALL 1ST: CPT

## 2023-08-04 PROCEDURE — 94664 DEMO&/EVAL PT USE INHALER: CPT

## 2023-08-04 PROCEDURE — 99223 1ST HOSP IP/OBS HIGH 75: CPT | Performed by: INTERNAL MEDICINE

## 2023-08-04 PROCEDURE — 2700000000 HC OXYGEN THERAPY PER DAY

## 2023-08-04 PROCEDURE — 94760 N-INVAS EAR/PLS OXIMETRY 1: CPT

## 2023-08-04 PROCEDURE — 97530 THERAPEUTIC ACTIVITIES: CPT

## 2023-08-04 PROCEDURE — 99223 1ST HOSP IP/OBS HIGH 75: CPT | Performed by: PHYSICAL MEDICINE & REHABILITATION

## 2023-08-04 PROCEDURE — 92610 EVALUATE SWALLOWING FUNCTION: CPT

## 2023-08-04 PROCEDURE — 6370000000 HC RX 637 (ALT 250 FOR IP): Performed by: STUDENT IN AN ORGANIZED HEALTH CARE EDUCATION/TRAINING PROGRAM

## 2023-08-04 PROCEDURE — 97166 OT EVAL MOD COMPLEX 45 MIN: CPT

## 2023-08-04 PROCEDURE — 36415 COLL VENOUS BLD VENIPUNCTURE: CPT

## 2023-08-04 PROCEDURE — 99231 SBSQ HOSP IP/OBS SF/LOW 25: CPT | Performed by: INTERNAL MEDICINE

## 2023-08-04 PROCEDURE — 6360000002 HC RX W HCPCS: Performed by: STUDENT IN AN ORGANIZED HEALTH CARE EDUCATION/TRAINING PROGRAM

## 2023-08-04 PROCEDURE — 85025 COMPLETE CBC W/AUTO DIFF WBC: CPT

## 2023-08-04 RX ORDER — FUROSEMIDE 40 MG/1
40 TABLET ORAL 2 TIMES DAILY
Status: CANCELLED | OUTPATIENT
Start: 2023-08-04

## 2023-08-04 RX ORDER — ACETAMINOPHEN 325 MG/1
650 TABLET ORAL EVERY 4 HOURS PRN
Status: DISCONTINUED | OUTPATIENT
Start: 2023-08-04 | End: 2023-08-04

## 2023-08-04 RX ORDER — SENNOSIDES A AND B 8.6 MG/1
2 TABLET, FILM COATED ORAL DAILY PRN
Status: DISCONTINUED | OUTPATIENT
Start: 2023-08-04 | End: 2023-08-18 | Stop reason: HOSPADM

## 2023-08-04 RX ORDER — POLYETHYLENE GLYCOL 3350 17 G/17G
17 POWDER, FOR SOLUTION ORAL DAILY
Status: CANCELLED | OUTPATIENT
Start: 2023-08-04

## 2023-08-04 RX ORDER — ALBUTEROL SULFATE 90 UG/1
2 AEROSOL, METERED RESPIRATORY (INHALATION) EVERY 6 HOURS PRN
Status: CANCELLED | OUTPATIENT
Start: 2023-08-04

## 2023-08-04 RX ORDER — SODIUM CHLORIDE 9 MG/ML
INJECTION, SOLUTION INTRAVENOUS PRN
Status: CANCELLED | OUTPATIENT
Start: 2023-08-04

## 2023-08-04 RX ORDER — BUSPIRONE HYDROCHLORIDE 5 MG/1
5 TABLET ORAL 3 TIMES DAILY
Status: CANCELLED | OUTPATIENT
Start: 2023-08-04

## 2023-08-04 RX ORDER — ONDANSETRON 4 MG/1
4 TABLET, ORALLY DISINTEGRATING ORAL EVERY 8 HOURS PRN
Status: DISCONTINUED | OUTPATIENT
Start: 2023-08-04 | End: 2023-08-18 | Stop reason: HOSPADM

## 2023-08-04 RX ORDER — BUSPIRONE HYDROCHLORIDE 5 MG/1
5 TABLET ORAL 3 TIMES DAILY
Status: DISCONTINUED | OUTPATIENT
Start: 2023-08-04 | End: 2023-08-18 | Stop reason: HOSPADM

## 2023-08-04 RX ORDER — HYDROXYZINE HYDROCHLORIDE 25 MG/1
25 TABLET, FILM COATED ORAL 3 TIMES DAILY PRN
Status: DISCONTINUED | OUTPATIENT
Start: 2023-08-04 | End: 2023-08-18 | Stop reason: HOSPADM

## 2023-08-04 RX ORDER — ACETAMINOPHEN 650 MG/1
650 SUPPOSITORY RECTAL EVERY 4 HOURS PRN
Status: CANCELLED | OUTPATIENT
Start: 2023-08-04

## 2023-08-04 RX ORDER — ACETAMINOPHEN 650 MG/1
650 SUPPOSITORY RECTAL EVERY 4 HOURS PRN
Status: DISCONTINUED | OUTPATIENT
Start: 2023-08-04 | End: 2023-08-18 | Stop reason: HOSPADM

## 2023-08-04 RX ORDER — SODIUM CHLORIDE 0.9 % (FLUSH) 0.9 %
5-40 SYRINGE (ML) INJECTION EVERY 12 HOURS SCHEDULED
Status: DISCONTINUED | OUTPATIENT
Start: 2023-08-04 | End: 2023-08-09

## 2023-08-04 RX ORDER — BISACODYL 10 MG
10 SUPPOSITORY, RECTAL RECTAL DAILY PRN
Status: CANCELLED | OUTPATIENT
Start: 2023-08-04

## 2023-08-04 RX ORDER — CITALOPRAM 20 MG/1
20 TABLET ORAL DAILY
Status: DISCONTINUED | OUTPATIENT
Start: 2023-08-05 | End: 2023-08-18 | Stop reason: HOSPADM

## 2023-08-04 RX ORDER — OXYCODONE HYDROCHLORIDE 5 MG/1
5 TABLET ORAL EVERY 4 HOURS PRN
Status: DISCONTINUED | OUTPATIENT
Start: 2023-08-04 | End: 2023-08-18 | Stop reason: HOSPADM

## 2023-08-04 RX ORDER — ACETAMINOPHEN 325 MG/1
650 TABLET ORAL EVERY 4 HOURS PRN
Status: CANCELLED | OUTPATIENT
Start: 2023-08-04

## 2023-08-04 RX ORDER — SODIUM CHLORIDE 9 MG/ML
INJECTION, SOLUTION INTRAVENOUS PRN
Status: DISCONTINUED | OUTPATIENT
Start: 2023-08-04 | End: 2023-08-18 | Stop reason: HOSPADM

## 2023-08-04 RX ORDER — SCOLOPAMINE TRANSDERMAL SYSTEM 1 MG/1
1 PATCH, EXTENDED RELEASE TRANSDERMAL
Status: DISCONTINUED | OUTPATIENT
Start: 2023-08-07 | End: 2023-08-09

## 2023-08-04 RX ORDER — ENOXAPARIN SODIUM 100 MG/ML
40 INJECTION SUBCUTANEOUS DAILY
Status: CANCELLED | OUTPATIENT
Start: 2023-08-04

## 2023-08-04 RX ORDER — POLYETHYLENE GLYCOL 3350 17 G/17G
17 POWDER, FOR SOLUTION ORAL DAILY
Status: DISCONTINUED | OUTPATIENT
Start: 2023-08-05 | End: 2023-08-18 | Stop reason: HOSPADM

## 2023-08-04 RX ORDER — ACETAMINOPHEN 325 MG/1
650 TABLET ORAL EVERY 4 HOURS PRN
Status: DISCONTINUED | OUTPATIENT
Start: 2023-08-04 | End: 2023-08-18 | Stop reason: HOSPADM

## 2023-08-04 RX ORDER — OXYCODONE HYDROCHLORIDE 5 MG/1
10 TABLET ORAL EVERY 4 HOURS PRN
Status: CANCELLED | OUTPATIENT
Start: 2023-08-04

## 2023-08-04 RX ORDER — HYDROXYZINE HYDROCHLORIDE 25 MG/1
25 TABLET, FILM COATED ORAL 3 TIMES DAILY PRN
Status: CANCELLED | OUTPATIENT
Start: 2023-08-04

## 2023-08-04 RX ORDER — POLYETHYLENE GLYCOL 3350 17 G/17G
17 POWDER, FOR SOLUTION ORAL DAILY
Status: DISCONTINUED | OUTPATIENT
Start: 2023-08-04 | End: 2023-08-04

## 2023-08-04 RX ORDER — CARVEDILOL 12.5 MG/1
12.5 TABLET ORAL 2 TIMES DAILY WITH MEALS
Status: CANCELLED | OUTPATIENT
Start: 2023-08-04

## 2023-08-04 RX ORDER — ONDANSETRON 4 MG/1
4 TABLET, ORALLY DISINTEGRATING ORAL EVERY 8 HOURS PRN
Status: CANCELLED | OUTPATIENT
Start: 2023-08-04

## 2023-08-04 RX ORDER — BISACODYL 10 MG
10 SUPPOSITORY, RECTAL RECTAL DAILY PRN
Status: DISCONTINUED | OUTPATIENT
Start: 2023-08-04 | End: 2023-08-18 | Stop reason: HOSPADM

## 2023-08-04 RX ORDER — CARBOXYMETHYLCELLULOSE SODIUM 10 MG/ML
GEL OPHTHALMIC
Status: DISCONTINUED | OUTPATIENT
Start: 2023-08-04 | End: 2023-08-18 | Stop reason: HOSPADM

## 2023-08-04 RX ORDER — SODIUM CHLORIDE 0.9 % (FLUSH) 0.9 %
5-40 SYRINGE (ML) INJECTION EVERY 12 HOURS SCHEDULED
Status: CANCELLED | OUTPATIENT
Start: 2023-08-04

## 2023-08-04 RX ORDER — FUROSEMIDE 40 MG/1
40 TABLET ORAL 2 TIMES DAILY
Status: DISCONTINUED | OUTPATIENT
Start: 2023-08-04 | End: 2023-08-18 | Stop reason: HOSPADM

## 2023-08-04 RX ORDER — ONDANSETRON 2 MG/ML
4 INJECTION INTRAMUSCULAR; INTRAVENOUS EVERY 6 HOURS PRN
Status: DISCONTINUED | OUTPATIENT
Start: 2023-08-04 | End: 2023-08-18 | Stop reason: HOSPADM

## 2023-08-04 RX ORDER — GABAPENTIN 100 MG/1
100 CAPSULE ORAL 2 TIMES DAILY
Status: CANCELLED | OUTPATIENT
Start: 2023-08-04

## 2023-08-04 RX ORDER — OXYCODONE HYDROCHLORIDE 10 MG/1
10 TABLET ORAL EVERY 4 HOURS PRN
Status: DISCONTINUED | OUTPATIENT
Start: 2023-08-04 | End: 2023-08-18 | Stop reason: HOSPADM

## 2023-08-04 RX ORDER — SENNOSIDES A AND B 8.6 MG/1
2 TABLET, FILM COATED ORAL DAILY PRN
Status: CANCELLED | OUTPATIENT
Start: 2023-08-04

## 2023-08-04 RX ORDER — ONDANSETRON 2 MG/ML
4 INJECTION INTRAMUSCULAR; INTRAVENOUS EVERY 6 HOURS PRN
Status: CANCELLED | OUTPATIENT
Start: 2023-08-04

## 2023-08-04 RX ORDER — OXYCODONE HYDROCHLORIDE 5 MG/1
5 TABLET ORAL EVERY 4 HOURS PRN
Status: CANCELLED | OUTPATIENT
Start: 2023-08-04

## 2023-08-04 RX ORDER — BISACODYL 10 MG
10 SUPPOSITORY, RECTAL RECTAL DAILY PRN
Status: DISCONTINUED | OUTPATIENT
Start: 2023-08-04 | End: 2023-08-04

## 2023-08-04 RX ORDER — CARVEDILOL 12.5 MG/1
12.5 TABLET ORAL 2 TIMES DAILY WITH MEALS
Status: DISCONTINUED | OUTPATIENT
Start: 2023-08-04 | End: 2023-08-12

## 2023-08-04 RX ORDER — SCOLOPAMINE TRANSDERMAL SYSTEM 1 MG/1
1 PATCH, EXTENDED RELEASE TRANSDERMAL
Status: CANCELLED | OUTPATIENT
Start: 2023-08-04

## 2023-08-04 RX ORDER — CITALOPRAM 20 MG/1
20 TABLET ORAL DAILY
Status: CANCELLED | OUTPATIENT
Start: 2023-08-04

## 2023-08-04 RX ORDER — GABAPENTIN 100 MG/1
100 CAPSULE ORAL 2 TIMES DAILY
Status: DISCONTINUED | OUTPATIENT
Start: 2023-08-04 | End: 2023-08-08

## 2023-08-04 RX ORDER — ALBUTEROL SULFATE 90 UG/1
2 AEROSOL, METERED RESPIRATORY (INHALATION) EVERY 6 HOURS PRN
Status: DISCONTINUED | OUTPATIENT
Start: 2023-08-04 | End: 2023-08-18 | Stop reason: HOSPADM

## 2023-08-04 RX ADMIN — LANSOPRAZOLE 15 MG: 15 TABLET, ORALLY DISINTEGRATING, DELAYED RELEASE ORAL at 08:53

## 2023-08-04 RX ADMIN — CITALOPRAM 20 MG: 20 TABLET, FILM COATED ORAL at 08:53

## 2023-08-04 RX ADMIN — ENOXAPARIN SODIUM 40 MG: 100 INJECTION SUBCUTANEOUS at 08:51

## 2023-08-04 RX ADMIN — GABAPENTIN 100 MG: 100 CAPSULE ORAL at 08:53

## 2023-08-04 RX ADMIN — BUSPIRONE HYDROCHLORIDE 5 MG: 10 TABLET ORAL at 16:13

## 2023-08-04 RX ADMIN — SODIUM CHLORIDE, PRESERVATIVE FREE 10 ML: 5 INJECTION INTRAVENOUS at 09:00

## 2023-08-04 RX ADMIN — CARVEDILOL 12.5 MG: 12.5 TABLET, FILM COATED ORAL at 08:53

## 2023-08-04 RX ADMIN — BUSPIRONE HYDROCHLORIDE 5 MG: 10 TABLET ORAL at 20:29

## 2023-08-04 RX ADMIN — OXYCODONE HYDROCHLORIDE 5 MG: 5 TABLET ORAL at 01:36

## 2023-08-04 RX ADMIN — GABAPENTIN 100 MG: 100 CAPSULE ORAL at 20:29

## 2023-08-04 RX ADMIN — DOCUSATE SODIUM 100 MG: 50 LIQUID ORAL at 08:53

## 2023-08-04 RX ADMIN — FUROSEMIDE 40 MG: 40 TABLET ORAL at 08:51

## 2023-08-04 RX ADMIN — BUSPIRONE HYDROCHLORIDE 5 MG: 5 TABLET ORAL at 08:53

## 2023-08-04 RX ADMIN — CARVEDILOL 12.5 MG: 12.5 TABLET, FILM COATED ORAL at 16:14

## 2023-08-04 RX ADMIN — FUROSEMIDE 40 MG: 40 TABLET ORAL at 20:29

## 2023-08-04 RX ADMIN — APIXABAN 5 MG: 5 TABLET, FILM COATED ORAL at 20:29

## 2023-08-04 ASSESSMENT — PAIN SCALES - GENERAL
PAINLEVEL_OUTOF10: 0
PAINLEVEL_OUTOF10: 6
PAINLEVEL_OUTOF10: 0
PAINLEVEL_OUTOF10: 5

## 2023-08-04 ASSESSMENT — ENCOUNTER SYMPTOMS
ABDOMINAL PAIN: 0
NAUSEA: 0
SORE THROAT: 0
VOMITING: 0
BACK PAIN: 1
COUGH: 0
SHORTNESS OF BREATH: 0

## 2023-08-04 ASSESSMENT — PAIN DESCRIPTION - DESCRIPTORS: DESCRIPTORS: ACHING

## 2023-08-04 ASSESSMENT — PAIN DESCRIPTION - ORIENTATION: ORIENTATION: ANTERIOR;POSTERIOR

## 2023-08-04 ASSESSMENT — PAIN DESCRIPTION - LOCATION
LOCATION: NECK
LOCATION: INCISION

## 2023-08-04 NOTE — PLAN OF CARE
Problem: Respiratory - Adult  Goal: Achieves optimal ventilation and oxygenation  Outcome: Progressing   MOBILIZE SECRETIONS    [x]   ASSESS BREATH SOUNDS  [x]   ASSESS SPUTUM PRODUCTION  [x]   COUGH AND DEEP BREATHING  []  IMPLEMENT SECRETION MANAGEMENT PROTOCOL  [x]   PATIENT EDUCATION AS NEEDED

## 2023-08-04 NOTE — PROGRESS NOTES
[facility]   Acute Rehabilitation Occupational Therapy Evaluation     Date: 23  Patient Name: Gilbert Conn       Room: 3473/0875-16  MRN: [id number]  Account: [de-identified]   : 1950  (68 y.o.) Gender: female       Referring Practitioner: Dr. Lashonda Butler  Diagnosis: s/p ACDF C6-C7 and C2-T2 fusion (23)    Treatment Diagnosis: Impaired self-care status    Past Medical History:  has a past medical history of Allergic rhinitis, cause unspecified, Back pain, Bowel obstruction (HCC), C. difficile diarrhea, CAD (coronary artery disease), Cardiac murmur, Cellulitis, Cerebral artery occlusion with cerebral infarction ([address]), COVID-19, Diverticulosis of colon (without mention of hemorrhage), GERD (gastroesophageal reflux disease), History of blood transfusion, History of CHF (congestive heart failure), History of MI (myocardial infarction), History of ovarian cyst, History of peritonitis, HTN (hypertension), Hx of blood clots, Hyperlipidemia, Intestinal or peritoneal adhesions with obstruction (postoperative) (postinfection) ([address]), Kidney infection, Lateral epicondylitis  of elbow, MDRO (multiple drug resistant organisms) resistance, Muscle strain, Other abnormal glucose, PONV (postoperative nausea and vomiting), Pre-diabetes, Restless legs syndrome (RLS), Snores, 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[address]), TIA (transient ischemic attack), Under care of service provider, Under care of service provider, Uses walker, Vitamin D deficiency, Wears glasses, and Wellness examination. Past Surgical History:   has a past surgical history that includes Ovary removal (); colectomy (); Appendectomy (); Ovary removal (); Hysterectomy (); Bunionectomy (Left); sinus surgery (); Colonoscopy; other surgical history (2014); cyst removal (Right); Wrist fracture surgery (Left, 2019); Upper gastrointestinal endoscopy (N/A, 2019);  Upper gastrointestinal endoscopy

## 2023-08-04 NOTE — PROGRESS NOTES
Physical Therapy  Facility/Department: [facility] ACUTE REHAB  Rehabilitation Physical Therapy Initial Assessment    NAME: Jorge Luis Pulido  : 1950 (68 y.o.)  MRN: [id number]  CODE STATUS: Full Code    Date of Service: 23      Past Medical History:   Diagnosis Date    Allergic rhinitis, cause unspecified     Back pain     lumbar    Bowel obstruction (HCC)     history of due to scar tissue, resolved non-surgically    C. difficile diarrhea     CAD (coronary artery disease)     no stent needed per pt.  Dr. Alicia Whitehead did cath at      Cardiac murmur     Cellulitis 2017    leg left leg/bug bite    Cerebral artery occlusion with cerebral infarction [facility])     TIA 2014    COVID-19     ONE YR AGO IN 2020 fever and cough    Diverticulosis of colon (without mention of hemorrhage)     GERD (gastroesophageal reflux disease)     on rx    History of blood transfusion     approx     -no reactions    History of CHF (congestive heart failure)     History of MI (myocardial infarction)     thought due to a blood clot    History of ovarian cyst     had oopherectomy holly    History of peritonitis     due to ruptured appendix age 13    HTN (hypertension)     Hx of blood clots     right leg    Hyperlipidemia     Intestinal or peritoneal adhesions with obstruction (postoperative) (postinfection) ([address])     Kidney infection     renal failure/sepsis/spider bite    Lateral epicondylitis  of elbow     MDRO (multiple drug resistant organisms) resistance     c diff    Muscle strain     right posterior shoulder    Other abnormal glucose     PONV (postoperative nausea and vomiting)     dry heaves    Pre-diabetes     Restless legs syndrome (RLS)     Snores     no cpap    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)     TIA (transient ischemic attack)     Under care of service provider 2023    hophwmepkz-yjeoza-giemsu-last visit 2023    Under care of service provider 2023    [id number] therapist has to block her L LE to prevent sliding. Pt with history of TIA and has left side weakness from TIA per pt/spouse. Pt will benefit from continued PT to address deficits and maximize safety and independence with mobility. She is currently a high fall risk and would be unsafe to return to prior living arrangements. GOALS  Patient Goals   Patient Goals : I want to get stronger and move better. Short Term Goals  Time Frame for Short Term Goals: 9  days. Short Term Goal 1: Perform bed mobility with Mod A  Short Term Goal 2: Perform functional transfers with max A with RW  Short Term Goal 3: Pt able to ambulate with rolling walker distance of  20 to 40 ft, min A x 2  Short Term Goal 4: Demo Fair- dynamic standing balance with rolling walker to decrease risk of falls  Short Term Goal 5: Pt able to propel w/c distance fo 30 to 50 ft, min/mod A level surface. Long Term Goals  Time Frame for Long Term Goals : By DC  Long Term Goal 1: Pt able to perform bed mobility min A with use of bed rail. Long Term Goal 2: Pt able to perform fucntional transfers at min A from 18\" or higher surfaces. Long Term Goal 3: Pt able to ambulate with RW, distance of 20 to 50 ft, min A, level surfaces  Long Term Goal 4: Pt able to take 5 to 10 steps outside [place] (parking lot/side walk) with rolling walker, mod A  Long Term Goal 5: Pt able to propel w/ distance upto 50 ft, level surfaces, SBA  Long term goal 6: Pt to demonstrate fair to fair+ technique for HEP for LE to continue at home    PLAN OF CARE  Frequency: 1-2 treatment sessions per day, 5-7 days per week  Physcial Therapy Plan  General Plan:  minutes of therapy at least 5 out of 7 days a week (1.5 hr/day, 5 to 7 days/week.)  Specific Instructions for Next Treatment: Continue to use natan gordillo for transfers, work on sit<> // bars.   Current Treatment Recommendations: Strengthening;ROM;Balance training;Functional mobility training;Transfer

## 2023-08-04 NOTE — PROGRESS NOTES
[facility]  Office: [id number]  1826, DO, Terrance Sargent, DO, Regla Senate, DO, Farhad Shepard Blood, DO, Molly Grijalva MD, Caroline Nesbitt MD, Thomas Hayes MD, Colonel Darnell MD,  Ramu Horvath MD, Mikey Robertson MD, Cori Chavira, DO, Edmond Valentine MD,  Kathrine Klinefelter, MD, Sugey Peng MD, Osbaldo Jaimes, DO, Eduardo Calvo MD,  Sissy Miles, DO, Jayjay Ding MD, Marti Larios MD, Ashley Aguirre MD, Leandro Liang MD,  Franki Bedolla MD, Bernice Gonzalez MD, Sara Ignacio DO, Matthias Quarles MD,  Vladimir Ayala MD, Evette Joaquin, CNP,  Lincoln Hayes, CNP, Vicky Jang, CNP, Yoshi Rae, CNP,  Trace Dela Cruz, [facility], Ayaka Rahman, CNP, Megha Laboy, CNP, Ethan Rizzo, CNP, Manuel Zhang, CNP, Meg Chavez, CNP, Damien Owen PA-C, Yolanda Wise, CNS, Ramana Obregon, CNP, Ainsley Munoz [facility]    Progress Note    8/4/2023    5:37 PM    Name:   Amparo Heart  MRN:     [id number]     Acct:      [de-identified]   Room:   [address] [place] [address] Day:  9  Admit Date:  7/26/2023  5:45 AM    PCP:   Jonna Martell MD  Code Status:  Full Code    Subjective:     C/C:   Neck pain and stiffness  Cervical spondylosis    Interval History Status:   POD 9  Improved  Sitting bedside  Cervical collar in place  Not ambulating  Anticipating discharge today    Data Base Updates:  BP remains somewhat labile    WBC7.0k/uL RBC3.11 Low m/uL Hemoglobin9. 9 Low      Ilecjfn915 High mg/dL     BUN27 High mg/dL Creatinine0.8     Brief History:     As documented in the medical record:  \"Per ICU course:   Patient initially presented for ANTERIOR CERVICAL DISCECTOMY, FUSION  C6-7; POSTERIOR CERVICAL LAMINECTOMY C1-2, CERVICAL FUSION C2-T2, after continued evaluations for neck pain, weakness and inability to hold her neck easily. Per patient she also had upper extremity weakness on the left greater than the right.   Surgical procedure Chest wall: No tenderness. Abdominal:      General: There is no distension. Palpations: Abdomen is soft. Tenderness: There is no abdominal tenderness. Musculoskeletal:         General: No tenderness. Right lower leg: Edema present. Left lower leg: Edema present. Comments: 1/4 edema at the ankles   Skin:     General: Skin is warm and dry. Comments: Incisional site appears stable   Neurological:      Mental Status: She is alert. Medications: Allergies: Allergies   Allergen Reactions    Bactrim [Sulfamethoxazole-Trimethoprim] Other (See Comments)     confusion    Cat Hair Extract Rash    Codeine Itching    Diazepam Other (See Comments)     Unsure of reaction    Meperidine Hcl Other (See Comments)     Unsure of reaction    Seasonal Other (See Comments)     hayfever       Current Meds:   Scheduled Meds:       Continuous Infusions:       PRN Meds:     Data:     I/O (24Hr):     Intake/Output Summary (Last 24 hours) at 8/4/2023 1737  Last data filed at 8/4/2023 1100  Gross per 24 hour   Intake 750 ml   Output 2000 ml   Net -1250 ml       Labs:  Hematology:  Recent Labs     08/02/23 0525 08/03/23 0459 08/04/23  0517   WBC 8.0 8.9 7.0   RBC 2.42* 2.93* 3.11*   HGB 8.8* 9.2* 9.9*   HCT 24.4* 29.9* 31.6*   .8 102.0 101.6   MCH 36.4* 31.4 31.8   MCHC 36.1* 30.8 31.3   RDW 13.5 12.8 12.7   * 291 334   MPV 10.4 9.7 9.4     Chemistry:  Recent Labs     08/02/23  0525 08/03/23 0459 08/04/23  0517    137 141   K 4.2 4.4 4.0   CL 98 98 98   CO2 28 28 31   GLUCOSE 106* 104* 105*   BUN 28* 27* 27*   CREATININE 0.8 0.7 0.8   ANIONGAP 9 11 12   LABGLOM >60 >60 >60   CALCIUM 8.9 8.9 9.2   No results for input(s): PROT, LABALBU, LABA1C, G2IZUHC, O6KVRYB, FT4, TSH, AST, ALT, LDH, GGT, ALKPHOS, LABGGT, BILITOT, BILIDIR, AMMONIA, AMYLASE, LIPASE, LACTATE, CHOL, HDL, LDLCHOLESTEROL, CHOLHDLRATIO, TRIG, VLDL, VHC20DA, PHENYTOIN, PHENYF, URICACID, POCGLU in the last 72

## 2023-08-04 NOTE — CARE COORDINATION
Transitional planning: LM for Lionel with [facility] that writer is attempting to get patient transported today and will call to notify of confirmed transport time. Call back number provided. [phone] Phone call from Lionel confirming that they are ready to accept patient today. [phone] Phone call to Kathy with [facility] to confirm transport time of 11am.    3041 Transport packet placed with soft chart.        Discharge [address] Case Management Department  Written by: Dudley Rosales RN    Patient Name: Haylee Land  Attending Provider: Giancarlo Painter DO  Admit Date: 2023  5:45 AM  MRN: [id number]  Account: [de-identified]                     : 1950  Discharge Date:       Disposition: Acute Rehab    Dudley Rosales RN

## 2023-08-04 NOTE — PROGRESS NOTES
Comprehensive Nutrition Assessment    Type and Reason for Visit:  Consult (Eval & treat)    Nutrition Recommendations/Plan:   Continue diet as ordered. Follow for any diet progression. Malnutrition Assessment:  Malnutrition Status:  No malnutrition (08/04/23 1528)    Context:  Acute Illness     Findings of the 6 clinical characteristics of malnutrition:  Energy Intake:  No significant decrease in energy intake (per pt's report)  Weight Loss:  No significant weight loss     Body Fat Loss:  No significant body fat loss     Muscle Mass Loss:  No significant muscle mass loss    Fluid Accumulation:   (mild to moderate) Generalized      Nutrition Assessment:    Pt was at [facility] for scheduled surgery for anterior cervical disectomy, fusion C6-7, posterior cervical laminectomy 1-2, cervical fusion C2-T2. Pt had been on pureed nectar thick per speech recommendations upgraded to Easy to Chew on 8-2. Pt states her weight is stable    Nutrition Related Findings:    edema: +1 BUE's, +2 non-pitting BLE's, facial-trace. Hx: CHF, MI, CAD, lumbar surgery in the past x 2, cervical spine surgery 2021. Labs & meds reviewed. Wound Type: Surgical Incision       Current Nutrition Intake & Therapies:    Average Meal Intake: Unable to assess (hasn't had a meal here yet)     ADULT DIET; Easy to Chew    Anthropometric Measures:  Height: 5' 3\" (160 cm)  Ideal Body Weight (IBW): 115 lbs (52 kg)    Admission Body Weight: 190 lb (86.2 kg)  Current Body Weight: 190 lb (86.2 kg), 165.2 % IBW. Weight Source: Not Specified  Current BMI (kg/m2): 33.7                          BMI Categories: Obese Class 1 (BMI 30.0-34. 9)    Estimated Daily Nutrient Needs:  Energy Requirements Based On: Formula  Weight Used for Energy Requirements: Admission  Energy (kcal/day): 1606-4913 kcals based on [place]. Frankey Dellen with 1.3-1.4 factor  Weight Used for Protein Requirements: Ideal  Protein (g/day): 73-83 gm protein based on 1.4-1.6 gm/kg         Nutrition Diagnosis:

## 2023-08-04 NOTE — PROGRESS NOTES
SLP ALL NOTES  Facility/Department: [facility] ACUTE REHAB   CLINICAL BEDSIDE SWALLOW EVALUATION    NAME: Abimael Bernardo  : 1950  MRN: [id number]    ADMISSION DATE: 2023  ADMITTING DIAGNOSIS: has Other specified disorders of rotator cuff syndrome of shoulder and allied disorders; Diverticulosis of large intestine; Intestinal or peritoneal adhesions with obstruction (postoperative) (postinfection) ([address]); Restless legs syndrome (RLS); GERD (gastroesophageal reflux disease); Hypertension; Hyperlipidemia; Other abnormal glucose; Atherosclerosis; Allergic rhinitis; Vitamin D deficiency; Major depression; Degenerative joint disease (DJD) of hip; Peripheral edema; Injury of foot, left; Facial droop; CVA (cerebral vascular accident) ([address]); Bradycardia; Ataxia; Aphasia; TIA (transient ischemic attack); Need for prophylactic vaccination and inoculation against cholera alone; Osteopenia; Lumbago; Meralgia paresthetica of right side; Lumbar degenerative disc disease; Spondylosis of lumbar region without myelopathy or radiculopathy; Blood poisoning; Cellulitis of left lower extremity; Encounter for medication monitoring; Deep vein thrombosis (DVT) of right lower extremity ([address]); Chronic deep vein thrombosis (DVT) of proximal vein of both lower extremities ([address]); Chronic diastolic heart failure ([address]); Neurogenic claudication due to lumbar spinal stenosis; Stage 3 chronic kidney disease ([address]); Type 2 diabetes mellitus with circulatory disorder, without long-term current use of insulin ([address]); Chronic deep vein thrombosis (DVT) of popliteal vein of right lower extremity ([address]); Closed fracture of left wrist; B12 deficiency; Iron deficiency anemia secondary to inadequate dietary iron intake; Closed head injury; Scalp laceration; Abnormal finding on imaging; Other irritable bowel syndrome; Frequent falls; Double vision; Muscle soreness; Peptic ulcer; Melena; PUD (peptic ulcer disease); Absolute anemia;  Acute postoperative anemia due for dysphagia x10-20/session. Goal 2: Pt will tolerate recommended diet with no overt s/s of aspiration 100% of the time. Goal 3: Pt will repeat MBSS when appropriate   General  Chart Reviewed: Yes  Comments: Pt with recent MBSS while at [facility] per EPIC. Easy to chew diet and thin liquids were recommended at that time (no straws)  Subjective  Subjective: \"swallowing, I feel better about it\"  Behavior/Cognition: Alert; Cooperative  Respiratory Status: O2 via nasual cannula  O2 Device: Nasal cannula  Communication Observation: Functional  Follows Directions: Simple  Dentition: Adequate  Patient Positioning: Upright in bed  Volitional Cough: Strong  Prior Dysphagia History: Pt with hx of dysphagia per chart  Consistencies Administered: Pureed; Thin - cup (Did not attempt regular trials due to recent MBSS recommendations)      Oral Motor Deficits  Labial: No impairment  Lingual: No impairment  Consistencies Administered: Pureed; Thin - cup (Did not attempt regular trials due to recent MBSS recommendations)    Oral Phase Dysfunction  Oral Phase  Oral Phase: WFL     Indicators of Pharyngeal Phase Dysfunction   Pharyngeal Phase  Pharyngeal Phase: WNL (with puree and thin trials via cup)  Pharyngeal Phase   Pharyngeal Phase: WNL (with puree and thin trials via cup)  Did not present trials of regular solid or straw sips based on recent MBSS recommendations on 8/3/23  Prognosis  Prognosis: Fair  Consulted and agree with results and recommendations: Patient    Education  Patient Education: yes  Patient Education Response: Verbalizes understanding             Therapy Time  SLP Individual Minutes  Time In: 9801  Time Out: [address] [employer]  Minutes: LIANG Jones M.A., [address] [facility]   8/4/2023 3:05 PM

## 2023-08-04 NOTE — DISCHARGE INSTR - COC
Continuity of Care Form    Patient Name: Cena Schwab   :  1950  MRN:  [id number]    Admit date:  2023  Discharge date:  ***    Code Status Order: Full Code   Advance Directives:   Advance Care Flowsheet Documentation       Date/Time Healthcare Directive Type of Healthcare Directive Copy in [address] Agent's Name Healthcare Agent's Phone Number    [phone] Yes, patient has an advance directive for healthcare treatment -- Yes, copy in chart -- -- --            Admitting Physician:  Kaleigh Syed DO  PCP: Bennie Jackson MD    Discharging Nurse: [facility] Unit/Room#: 9379/9790-98  Discharging Unit Phone Number: ***    Emergency Contact:   Extended Emergency Contact Information  Primary Emergency Contact: Ángel Solorio  Address: [address] [place] [address], [address] [place] [address] of [address] [facility] Phone: [phone]  Mobile Phone: [phone]  Relation: Spouse  Hearing or visual needs: None  Other needs: None  Preferred language: English   needed? No  Secondary Emergency Contact: [facility] Phone: [phone]  Mobile Phone: [phone]  Relation: Child    Past Surgical History:  Past Surgical History:   Procedure Laterality Date    ABDOMEN SURGERY      benign tumor removed near remaining ovary, 1.5 pounds    APPENDECTOMY      appendix ruptured, developed peritonitis    BACK SURGERY      BUNIONECTOMY Left     along with calcium deposits removed    CARDIAC CATHETERIZATION      negative    CERVICAL FUSION N/A 2021    POSTERIOR C3-6 LAMINECTOMY, PARTIAL C7 LAMINECTOMY, FUSION C3-C6, SILVERCORD performed by Kaleigh Syed DO at [address] [place] [address] N/A 2023    ANTERIOR CERVICAL DISCECTOMY, OSTEOTOMY, FUSION,  C6-7, CORRECTION OF DEFORMITY.  performed by Kaleigh Syed DO at [address] [place] [address] Drive 2023    POSTERIOR CERVICAL OSTEOTOMY C6-7, REMOVAL OF PREVIOUS HARDWARE, CERVICAL FUSION C2-T2, rotator cuff syndrome of shoulder and allied disorders [id number]    Diverticulosis of large intestine K57.30    Intestinal or peritoneal adhesions with obstruction (postoperative) (postinfection) ([facility]) K56.50    Restless legs syndrome (RLS) G25.81    GERD (gastroesophageal reflux disease) K21.9    Hypertension I10    Hyperlipidemia E78.5    Other abnormal glucose R73.09    Atherosclerosis I70.90    Allergic rhinitis J30.9    Vitamin D deficiency E55.9    Major depression F32.9    Degenerative joint disease (DJD) of hip M16.9    Peripheral edema R60.9    Injury of foot, left S68.968Z    Facial droop R29.810    CVA (cerebral vascular accident) ([address]) I63.9    Bradycardia R00.1    Ataxia R27.0    Aphasia R47.01    TIA (transient ischemic attack) G45.9    Need for prophylactic vaccination and inoculation against cholera alone Z23    Osteopenia M85.80    Lumbago M54.50    Meralgia paresthetica of right side G57.11    Lumbar degenerative disc disease M51.36    Spondylosis of lumbar region without myelopathy or radiculopathy M47.816    Blood poisoning [id number]    Cellulitis of left lower extremity L03.116    Encounter for medication monitoring Z51.81    Deep vein thrombosis (DVT) of right lower extremity ([facility]) I82.401    Chronic deep vein thrombosis (DVT) of proximal vein of both lower extremities ([facility]) I82.5Y3    Chronic diastolic heart failure ([facility]) I50.32    Neurogenic claudication due to lumbar spinal stenosis M48.062    Stage 3 chronic kidney disease ([facility]) N18.30    Type 2 diabetes mellitus with circulatory disorder, without long-term current use of insulin ([facility]) E11.59    Chronic deep vein thrombosis (DVT) of popliteal vein of right lower extremity ([facility]) I82.531    Closed fracture of left wrist S62.102A    B12 deficiency E53.8    Iron deficiency anemia secondary to inadequate dietary iron intake D50.8    Closed head injury S09.90XA    Scalp laceration S01. 01XA    Abnormal finding on imaging R93.89    Other irritable

## 2023-08-04 NOTE — CARE COORDINATION
[facility] Acute Inpatient Rehabilitation  Case Management Assessment  Initial Evaluation    Date/Time of Evaluation: 8/4/2023 3:05 PM  Assessment Completed by: Jessi Chang RN    If patient is discharged prior to next notation, then this note serves as note for discharge by case management. Patient Name: Stephan Benito                     Date / Time: 8/4/2023 12:34 PM  YOB: 1950  Diagnosis: Spinal stenosis in cervical region [M48.02]                     Patient Admission Status: REHAB IP   Readmission Risk (Low < 19, Mod (19-27), High > 27): Readmission Risk Score: 19.1      Current PCP: Wesley Sow MD  PCP verified by CM? Chart Reviewed: Yes      History Provided by:    Patient Orientation:      Patient Cognition:      Advance Directives:    Code Status: Prior   Patient's Primary Decision Maker is:      Primary Decision Maker: Song Weir - Spouse - [phone]    Secondary Decision Maker: Anitra Sol - Child - [phone]    Current Services Prior to Admission:  Current Financial resources:    Current community resources:    Current services prior to admission:    Type of Home Care services:     Current Home DME:    Do you have a wheelchair ramp/Plans to build? Yes  Handicap Placard: Has    Discharge Planning:  Patient lives with:     Type of Home:    Primary Care Giver:    Patient Support Systems include:     Family can provide assistance at DC:    Does patient have 24 hour assistance at home:  Yes  Is patient agreeable to VNS or Outpatient therapy Yes  [place] of choice provided: Yes  List of Home Care Agencies/Outpatient therapy provided: pt current with [facility]  VNS/Outpatient therapy chosen: Yes    Does patient go to outpatient dialysis: No  If yes, location and chair time: na    Would you like Case Management to discuss the discharge plan with any other family members/significant others, and if so, who?     Plans to Return to Present Housing:    Potential

## 2023-08-04 NOTE — PROGRESS NOTES
[facility]    Patient Name: Radha Feliz  MRN: [id number]   Date of Admit: 8/4/2023  Time of Arrival: 3648    Patient admitted to the Acute Inpatient Rehabilitation Unit via Stretcher    Patient oriented to room, unit and fall prevention safety measures. Education provided on the rehabilitation routine and therapy schedules. Drug / Medication Regimen Review   Admitting medication orders compared with acute stay medications; home medication list reviewed with patient/family. Medication Issues Identified ? No If Yes, Check All That Apply   []  Allergy to medication   []  Drug interactions (drug/drug, drug/food, drug/disease interactions)   []  Duplicate drug   []  Omission (drug missing from prescribed regimen)   []  Non adherence   []  Adverse reaction   []  Wrong patient, drug, dose, route, time error   []  Ineffective drug therapy       High-Risk Drug Classes: Use and Indication   Check if the patient is taking any medications by pharmacological classification If yes, check if there is an indication noted for all meds in the drug class   Antipsychotic No If yes: indication noted? [] If no indication noted, follow up with provider for order clarification   Anticoagulant Yes If yes: indication noted? []    Antibiotic No If yes: indication noted? []    Opioid Yes If yes: indication noted? []    Antiplatelet No If yes: indication noted? []    Hypoglycemic (Including Insulin) No If yes: indication noted? []      Attending Physical Medicine & Rehabilitation (PM&R) Admitting Order Review   Admission orders reviewed with Acute Inpatient Rehabilitation Attending PM&R Physician: Shin Blackwell. Jesús Uriarte MD     Skin Assessment   Skin assessment performed by two nurses: all active alterations in skin integrity, wounds, lines, drains and airways assessed, measured, recorded and reconciled. Refer to [facility] avatar and LDA flowsheet for additional information.     Nurse 1

## 2023-08-05 LAB
ALBUMIN SERPL-MCNC: 3.2 G/DL (ref 3.5–5.2)
ALP SERPL-CCNC: 73 U/L (ref 35–104)
ALT SERPL-CCNC: 30 U/L (ref 5–33)
ANION GAP SERPL CALCULATED.3IONS-SCNC: 12 MMOL/L (ref 9–17)
AST SERPL-CCNC: 33 U/L
BASOPHILS # BLD: 0.1 K/UL (ref 0–0.2)
BASOPHILS NFR BLD: 1 % (ref 0–2)
BILIRUB DIRECT SERPL-MCNC: <0.1 MG/DL
BILIRUB INDIRECT SERPL-MCNC: ABNORMAL MG/DL (ref 0–1)
BILIRUB SERPL-MCNC: 0.3 MG/DL (ref 0.3–1.2)
BUN SERPL-MCNC: 21 MG/DL (ref 8–23)
CALCIUM SERPL-MCNC: 8.9 MG/DL (ref 8.6–10.4)
CHLORIDE SERPL-SCNC: 100 MMOL/L (ref 98–107)
CO2 SERPL-SCNC: 30 MMOL/L (ref 20–31)
CREAT SERPL-MCNC: 0.7 MG/DL (ref 0.5–0.9)
EOSINOPHIL # BLD: 0.5 K/UL (ref 0–0.4)
EOSINOPHILS RELATIVE PERCENT: 10 % (ref 0–4)
ERYTHROCYTE [DISTWIDTH] IN BLOOD BY AUTOMATED COUNT: 13.1 % (ref 11.5–14.9)
GFR SERPL CREATININE-BSD FRML MDRD: >60 ML/MIN/1.73M2
GLUCOSE SERPL-MCNC: 116 MG/DL (ref 70–99)
HCT VFR BLD AUTO: 27.8 % (ref 36–46)
HGB BLD-MCNC: 9.5 G/DL (ref 12–16)
LYMPHOCYTES NFR BLD: 1.2 K/UL (ref 1–4.8)
LYMPHOCYTES RELATIVE PERCENT: 22 % (ref 24–44)
MCH RBC QN AUTO: 31.9 PG (ref 26–34)
MCHC RBC AUTO-ENTMCNC: 34.1 G/DL (ref 31–37)
MCV RBC AUTO: 93.6 FL (ref 80–100)
MONOCYTES NFR BLD: 0.8 K/UL (ref 0.1–1.3)
MONOCYTES NFR BLD: 14 % (ref 1–7)
NEUTROPHILS NFR BLD: 53 % (ref 36–66)
NEUTS SEG NFR BLD: 3.1 K/UL (ref 1.3–9.1)
PLATELET # BLD AUTO: 380 K/UL (ref 150–450)
PMV BLD AUTO: 7 FL (ref 6–12)
POTASSIUM SERPL-SCNC: 3.8 MMOL/L (ref 3.7–5.3)
PROT SERPL-MCNC: 6.6 G/DL (ref 6.4–8.3)
RBC # BLD AUTO: 2.97 M/UL (ref 4–5.2)
SODIUM SERPL-SCNC: 142 MMOL/L (ref 135–144)
WBC OTHER # BLD: 5.7 K/UL (ref 3.5–11)

## 2023-08-05 PROCEDURE — 80076 HEPATIC FUNCTION PANEL: CPT

## 2023-08-05 PROCEDURE — 36415 COLL VENOUS BLD VENIPUNCTURE: CPT

## 2023-08-05 PROCEDURE — 85025 COMPLETE CBC W/AUTO DIFF WBC: CPT

## 2023-08-05 PROCEDURE — 97530 THERAPEUTIC ACTIVITIES: CPT

## 2023-08-05 PROCEDURE — 80048 BASIC METABOLIC PNL TOTAL CA: CPT

## 2023-08-05 PROCEDURE — 6370000000 HC RX 637 (ALT 250 FOR IP): Performed by: NURSE PRACTITIONER

## 2023-08-05 PROCEDURE — 99232 SBSQ HOSP IP/OBS MODERATE 35: CPT | Performed by: INTERNAL MEDICINE

## 2023-08-05 PROCEDURE — 92526 ORAL FUNCTION THERAPY: CPT

## 2023-08-05 PROCEDURE — 97110 THERAPEUTIC EXERCISES: CPT

## 2023-08-05 PROCEDURE — 97535 SELF CARE MNGMENT TRAINING: CPT

## 2023-08-05 PROCEDURE — 1180000000 HC REHAB R&B

## 2023-08-05 PROCEDURE — 97116 GAIT TRAINING THERAPY: CPT

## 2023-08-05 RX ADMIN — APIXABAN 5 MG: 5 TABLET, FILM COATED ORAL at 20:25

## 2023-08-05 RX ADMIN — OXYCODONE HYDROCHLORIDE 10 MG: 10 TABLET ORAL at 20:26

## 2023-08-05 RX ADMIN — FUROSEMIDE 40 MG: 40 TABLET ORAL at 09:04

## 2023-08-05 RX ADMIN — OXYCODONE HYDROCHLORIDE 10 MG: 10 TABLET ORAL at 09:04

## 2023-08-05 RX ADMIN — APIXABAN 5 MG: 5 TABLET, FILM COATED ORAL at 09:04

## 2023-08-05 RX ADMIN — ACETAMINOPHEN 650 MG: 325 TABLET ORAL at 15:56

## 2023-08-05 RX ADMIN — GABAPENTIN 100 MG: 100 CAPSULE ORAL at 20:26

## 2023-08-05 RX ADMIN — CITALOPRAM HYDROBROMIDE 20 MG: 20 TABLET ORAL at 09:04

## 2023-08-05 RX ADMIN — FUROSEMIDE 40 MG: 40 TABLET ORAL at 20:25

## 2023-08-05 RX ADMIN — BUSPIRONE HYDROCHLORIDE 5 MG: 10 TABLET ORAL at 14:01

## 2023-08-05 RX ADMIN — OXYCODONE HYDROCHLORIDE 10 MG: 10 TABLET ORAL at 14:01

## 2023-08-05 RX ADMIN — LANSOPRAZOLE 15 MG: 15 TABLET, ORALLY DISINTEGRATING, DELAYED RELEASE ORAL at 05:19

## 2023-08-05 RX ADMIN — CARVEDILOL 12.5 MG: 12.5 TABLET, FILM COATED ORAL at 15:56

## 2023-08-05 RX ADMIN — GABAPENTIN 100 MG: 100 CAPSULE ORAL at 09:04

## 2023-08-05 RX ADMIN — CARVEDILOL 12.5 MG: 12.5 TABLET, FILM COATED ORAL at 09:04

## 2023-08-05 RX ADMIN — BUSPIRONE HYDROCHLORIDE 5 MG: 10 TABLET ORAL at 20:26

## 2023-08-05 RX ADMIN — BUSPIRONE HYDROCHLORIDE 5 MG: 10 TABLET ORAL at 09:06

## 2023-08-05 ASSESSMENT — PAIN SCALES - GENERAL
PAINLEVEL_OUTOF10: 0
PAINLEVEL_OUTOF10: 5
PAINLEVEL_OUTOF10: 0
PAINLEVEL_OUTOF10: 7
PAINLEVEL_OUTOF10: 0
PAINLEVEL_OUTOF10: 0
PAINLEVEL_OUTOF10: 3
PAINLEVEL_OUTOF10: 0

## 2023-08-05 ASSESSMENT — PAIN DESCRIPTION - DESCRIPTORS: DESCRIPTORS: ACHING;DISCOMFORT

## 2023-08-05 ASSESSMENT — PAIN DESCRIPTION - LOCATION
LOCATION: NECK
LOCATION: NECK

## 2023-08-05 ASSESSMENT — PAIN DESCRIPTION - PAIN TYPE: TYPE: ACUTE PAIN;SURGICAL PAIN

## 2023-08-05 NOTE — PLAN OF CARE
Problem: Skin/Tissue Integrity  Goal: Absence of new skin breakdown  Description: 1. Monitor for areas of redness and/or skin breakdown  2. Assess vascular access sites hourly  3. Every 4-6 hours minimum:  Change oxygen saturation probe site  4. Every 4-6 hours:  If on nasal continuous positive airway pressure, respiratory therapy assess nares and determine need for appliance change or resting period. Outcome: Progressing  Note: Assess the overall condition of the skin. Check on bony prominences such as the sacrum, trochanters, scapulae, elbows, heels, inner and outer malleolus, inner and outer knees, back of head). Reinforce the importance of turning, mobility, and ambulation. Problem: Safety - Adult  Goal: Free from fall injury  Outcome: Progressing  Note: Patient remains free of falls and injuries throughout shift. Bed remains in the lowest position, wheels locked, call light and bedside table are within reach.

## 2023-08-05 NOTE — PROGRESS NOTES
Physical Medicine & Rehabilitation  Progress Note    8/5/2023 11:05 AM     Subjective:   Pt is feeling well this AM, pain seems to be well controlled. participating well With TX.    ROS:  Denies fevers, chills, sweats. No chest pain, palpitations, lightheadedness. Denies coughing, wheezing or shortness of breath. Denies abdominal pain, nausea, diarrhea or constipation. No new areas of joint pain. Denies new areas of numbness or weakness. Denies new anxiety or depression issues. No new skin problems. Rehabilitation:     PT    Bed mobility  Rolling to Left: Maximum assistance  Rolling to Right: Maximum assistance  Supine to Sit: Maximum assistance  Sit to Supine: Moderate assistance;2 Person assistance  Scooting: Moderate assistance (@ EOB)  Bed Mobility Comments: HOB elevated to 30 degrees, verbal cues for sequencing, technique, and safety  Transfers  Sit to Stand: Maximum Assistance;2 Person Assistance (Therapist blocking Left foot, as it slides forward.)  Stand to Sit: Maximum Assistance;2 Person Assistance (LEft foot slides out/goes into extension as pt sits down.)  Bed to Chair: Dependent/Total (1st attempt with rolling walker, very unsafe transfers as pt not donny to  her foot-bed >w/c. 2nd transfer with natan gordillo from w/c to bed 2 person dependent. CAUTION- Left foot slides forward on foot flat of natan gordillo.)  Comment: Pt performed multiple transfers throughout session. Pt initially stood from bed (x2 trials) with use of RW requiring maxA x2 and also cueing for proper hand/foot placement. Pt required blocking of B feet throughout transfers as her left foot slides forward. . Pt with a posterior lean when standing wiht RW, difficulty correcting and unable to advance LEs to attempt steps, pt attempt scooting her feet with Rw for transfers-very unsafe technique and transfers with RW. Kat Valle  Pt performed additional transfers in [facility] requiring maxA to assist pt to stand and another therapist

## 2023-08-05 NOTE — PLAN OF CARE
Problem: Discharge Planning  Goal: Discharge to home or other facility with appropriate resources  Outcome: Progressing  Note: Discharge planning will begin when the patient meets discharge criteria to go home or to the facility     Problem: Skin/Tissue Integrity  Goal: Absence of new skin breakdown  Description: 1. Monitor for areas of redness and/or skin breakdown  2. Assess vascular access sites hourly  3. Every 4-6 hours minimum:  Change oxygen saturation probe site  4. Every 4-6 hours:  If on nasal continuous positive airway pressure, respiratory therapy assess nares and determine need for appliance change or resting period.   Outcome: Progressing  Note: Patient is able to me moved in bed to prevent skin breakdown     Problem: Safety - Adult  Goal: Free from fall injury  Outcome: Progressing  Note: Call light within reach, bed alarm on, bed in lowest position and socks on     Problem: Pain  Goal: Verbalizes/displays adequate comfort level or baseline comfort level  Outcome: Progressing  Note: Pain is being managed with rest, repositioning and medication

## 2023-08-05 NOTE — PROGRESS NOTES
[facility]   Acute Rehabilitation Occupational Therapy Daily Treatment Note    Date: 23  Patient Name: Cena Schwab       Room: 6183/6403-41  MRN: [id number]  Account: [de-identified]   : 1950  (68 y.o.) Gender: female       Referring Practitioner: Dr. Gian Dillard  Diagnosis: s/p ACDF C6-C7 and C2-T2 fusion (23)       Treatment Diagnosis: Impaired self-care status    Past Medical History:  has a past medical history of Allergic rhinitis, cause unspecified, Back pain, Bowel obstruction (HCC), C. difficile diarrhea, CAD (coronary artery disease), Cardiac murmur, Cellulitis, Cerebral artery occlusion with cerebral infarction ([address]), COVID-19, Diverticulosis of colon (without mention of hemorrhage), GERD (gastroesophageal reflux disease), History of blood transfusion, History of CHF (congestive heart failure), History of MI (myocardial infarction), History of ovarian cyst, History of peritonitis, HTN (hypertension), Hx of blood clots, Hyperlipidemia, Intestinal or peritoneal adhesions with obstruction (postoperative) (postinfection) ([address]), Kidney infection, Lateral epicondylitis  of elbow, MDRO (multiple drug resistant organisms) resistance, Muscle strain, Other abnormal glucose, PONV (postoperative nausea and vomiting), Pre-diabetes, Restless legs syndrome (RLS), Snores, 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[address]), TIA (transient ischemic attack), Under care of service provider, Under care of service provider, Uses walker, Vitamin D deficiency, Wears glasses, and Wellness examination. Past Surgical History:   has a past surgical history that includes Ovary removal (); colectomy (); Appendectomy (); Ovary removal (); Hysterectomy (); Bunionectomy (Left); sinus surgery (); Colonoscopy; other surgical history (2014); cyst removal (Right); Wrist fracture surgery (Left, 2019); Upper gastrointestinal endoscopy (N/A, 2019);  Upper gastrointestinal

## 2023-08-06 PROCEDURE — 97535 SELF CARE MNGMENT TRAINING: CPT

## 2023-08-06 PROCEDURE — 99232 SBSQ HOSP IP/OBS MODERATE 35: CPT | Performed by: INTERNAL MEDICINE

## 2023-08-06 PROCEDURE — 97542 WHEELCHAIR MNGMENT TRAINING: CPT

## 2023-08-06 PROCEDURE — 1180000000 HC REHAB R&B

## 2023-08-06 PROCEDURE — 97110 THERAPEUTIC EXERCISES: CPT

## 2023-08-06 PROCEDURE — 6370000000 HC RX 637 (ALT 250 FOR IP): Performed by: NURSE PRACTITIONER

## 2023-08-06 PROCEDURE — 97530 THERAPEUTIC ACTIVITIES: CPT

## 2023-08-06 RX ADMIN — FUROSEMIDE 40 MG: 40 TABLET ORAL at 20:25

## 2023-08-06 RX ADMIN — BUSPIRONE HYDROCHLORIDE 5 MG: 10 TABLET ORAL at 20:26

## 2023-08-06 RX ADMIN — APIXABAN 5 MG: 5 TABLET, FILM COATED ORAL at 08:04

## 2023-08-06 RX ADMIN — OXYCODONE HYDROCHLORIDE 10 MG: 10 TABLET ORAL at 17:51

## 2023-08-06 RX ADMIN — FUROSEMIDE 40 MG: 40 TABLET ORAL at 08:04

## 2023-08-06 RX ADMIN — BUSPIRONE HYDROCHLORIDE 5 MG: 10 TABLET ORAL at 08:09

## 2023-08-06 RX ADMIN — POLYETHYLENE GLYCOL 3350 17 G: 17 POWDER, FOR SOLUTION ORAL at 08:04

## 2023-08-06 RX ADMIN — DOCUSATE SODIUM 100 MG: 50 LIQUID ORAL at 08:04

## 2023-08-06 RX ADMIN — GABAPENTIN 100 MG: 100 CAPSULE ORAL at 08:04

## 2023-08-06 RX ADMIN — CARVEDILOL 12.5 MG: 12.5 TABLET, FILM COATED ORAL at 17:48

## 2023-08-06 RX ADMIN — CITALOPRAM HYDROBROMIDE 20 MG: 20 TABLET ORAL at 08:04

## 2023-08-06 RX ADMIN — BUSPIRONE HYDROCHLORIDE 5 MG: 10 TABLET ORAL at 14:55

## 2023-08-06 RX ADMIN — APIXABAN 5 MG: 5 TABLET, FILM COATED ORAL at 20:25

## 2023-08-06 RX ADMIN — LANSOPRAZOLE 15 MG: 15 TABLET, ORALLY DISINTEGRATING, DELAYED RELEASE ORAL at 06:24

## 2023-08-06 RX ADMIN — OXYCODONE HYDROCHLORIDE 10 MG: 10 TABLET ORAL at 08:58

## 2023-08-06 RX ADMIN — CARVEDILOL 12.5 MG: 12.5 TABLET, FILM COATED ORAL at 08:04

## 2023-08-06 RX ADMIN — GABAPENTIN 100 MG: 100 CAPSULE ORAL at 20:25

## 2023-08-06 ASSESSMENT — PAIN DESCRIPTION - LOCATION
LOCATION: BACK
LOCATION: NECK
LOCATION: SHOULDER

## 2023-08-06 ASSESSMENT — PAIN SCALES - GENERAL
PAINLEVEL_OUTOF10: 0
PAINLEVEL_OUTOF10: 9
PAINLEVEL_OUTOF10: 8
PAINLEVEL_OUTOF10: 0
PAINLEVEL_OUTOF10: 4
PAINLEVEL_OUTOF10: 0

## 2023-08-06 NOTE — PROGRESS NOTES
Physical Medicine & Rehabilitation  Progress Note    8/6/2023 11:26 AM     Subjective:   Pt is feeling well this AM, pain seems to be well controlled. participating well With [employer]. Denies any complaints. ROS:  Denies fevers, chills, sweats. No chest pain, palpitations, lightheadedness. Denies coughing, wheezing or shortness of breath. Denies abdominal pain, nausea, diarrhea or constipation. No new areas of joint pain. Denies new areas of numbness or weakness. Denies new anxiety or depression issues. No new skin problems. Rehabilitation:     PT  Functional Mobility  Bed mobility  Rolling to Left: Maximum assistance  Rolling to Right: Maximum assistance  Supine to Sit: Maximum assistance  Sit to Supine: Moderate assistance;2 Person assistance  Scooting: Dependent/Total;2 Person assistance (scooting up in bed -supine position.)  Bed Mobility Comments: HOB elevated to 30 degrees, verbal cues for sequencing, technique, and safety  Transfers  Sit to Stand: Maximum Assistance (Therapist blocking Left foot, as it slides forward.)  Stand to Sit: Maximum Assistance (Therapist blocking L LE , so ti does not slide out.)  Bed to Chair: Dependent/Total Joshua gordillo-2 person assist.)  Comment: Multiple times sit<> // bars at max A x 1, standing tolerance for ~1 minute at a time. Balance  Posture: Fair  Sitting - Static: Fair;- (posterior lean, L LE slides forwrad, knee into extension.)  Sitting - Dynamic: Poor;+  Standing - Static: Poor  Standing - Dynamic: Poor;-  Comments: standing balance assessedin // barsv and in natan gordillo, pt able to sit EOB unsupported with CGA initially requiring Jennifer posterior lean. Environmental Mobility  Ambulation  WB Status:  (-)  Ambulation  Surface: Level tile  Device: Parallel Bars  Other Apparatus: Wheelchair follow  Assistance: Moderate assistance;2 Person assistance;Maximum assistance (Therapist in front, watching L LE placement, 2nd assist on left side of pt.  Mod ax 2 in

## 2023-08-06 NOTE — PLAN OF CARE
mobility training. SPEECH THERAPY:   Goals:  Short-term Goals  Goal 1: Will wear C-collar and be up at 90 degrees for all PO  NO STRAWS  Goal 2: Pt will complete OMEX for dysphagia x10-20/session. Goal 3: Pt will tolerate recommended diet with no overt s/s of aspiration 100% of the time. Plan of Care: Pt to be seen by speech therapy services 1 Hour per day at least 5 out of 7 days per week    Anticipated interventions may include speech/language/communication therapy, cognitive training, group therapy, education, and/or dysphagia therapy based on the above goals. CASE MANAGEMENT:  Goals:   Assist patient/family with discharge planning, patient/family counseling,  and coordination with insurance during the inpatient rehabilitation stay. Other members of the multidisciplinary rehabilitation team that will be involved in the patient's plan of care include recreational therapy, dietary, respiratory therapy, and neuropsychology. Medical issues being managed closely and that require 24 hour availability of a physician:  Swallowing precautions, Bowel/Bladder function, Weight bearing precautions, Wound care, Pain management, Infection protection, DVT prophylaxis, Fall precautions, Fluid/Electrolyte balance, Nutritional status, Respiratory needs, Anemia, and History of heart disease                                           Physician anticipated functional outcomes: Improved independence with functional measures   Estimated length of stay for this admission 2 weeks  Medical Prognosis: Fair  Anticipated disposition: Home. The potential to achieve the above medical and rehabilitative goals is fair. This plan of care has been developed with the assistance and input of the multidisciplinary rehabilitation team.  The plan was reviewed with the patient on 8/6/2023.   The patient has had the opportunity to provide input to the therapy team.    I have reviewed this Individualized Plan of Care and agree with its contents. Above documentation has been expanded, modified, adjusted to reflect the findings of my evaluations and goals for the patient.     Physician:  Sukumar Corbett

## 2023-08-06 NOTE — PROGRESS NOTES
[facility]   Acute Rehabilitation Occupational Therapy Daily Treatment Note    Date: 23  Patient Name: Stephan Benito       Room: 7383/8547-40  MRN: [id number]  Account: [de-identified]   : 1950  (68 y.o.) Gender: female       Referring Practitioner: Dr. Ara Lopez  Diagnosis: s/p ACDF C6-C7 and C2-T2 fusion (23)       Treatment Diagnosis: Impaired self-care status    Past Medical History:  has a past medical history of Allergic rhinitis, cause unspecified, Back pain, Bowel obstruction (HCC), C. difficile diarrhea, CAD (coronary artery disease), Cardiac murmur, Cellulitis, Cerebral artery occlusion with cerebral infarction ([address]), COVID-19, Diverticulosis of colon (without mention of hemorrhage), GERD (gastroesophageal reflux disease), History of blood transfusion, History of CHF (congestive heart failure), History of MI (myocardial infarction), History of ovarian cyst, History of peritonitis, HTN (hypertension), Hx of blood clots, Hyperlipidemia, Intestinal or peritoneal adhesions with obstruction (postoperative) (postinfection) ([address]), Kidney infection, Lateral epicondylitis  of elbow, MDRO (multiple drug resistant organisms) resistance, Muscle strain, Other abnormal glucose, PONV (postoperative nausea and vomiting), Pre-diabetes, Restless legs syndrome (RLS), Snores, 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[address]), TIA (transient ischemic attack), Under care of service provider, Under care of service provider, Uses walker, Vitamin D deficiency, Wears glasses, and Wellness examination. Past Surgical History:   has a past surgical history that includes Ovary removal (); colectomy (); Appendectomy (); Ovary removal (); Hysterectomy (); Bunionectomy (Left); sinus surgery (); Colonoscopy; other surgical history (2014); cyst removal (Right); Wrist fracture surgery (Left, 2019); Upper gastrointestinal endoscopy (N/A, 2019);  Upper gastrointestinal endoscopy (N/A, 08/05/2019); Upper gastrointestinal endoscopy (N/A, 08/23/2019); Abdomen surgery (1976); lumbar fusion (N/A, 02/10/2020); Cardiac catheterization (2005); [place] tooth extraction; lumbar fusion (N/A, 06/17/2020); back surgery; cervical fusion (N/A, 05/21/2021); Finger Closed Reduction (Left, 08/24/2022); cervical fusion (N/A, 7/26/2023); and cervical fusion (N/A, 7/26/2023). Restrictions  Restrictions/Precautions  Restrictions/Precautions: Surgical Protocols, Fall Risk  Required Braces or Orthoses?: Yes  Implants present? : Metal implants (Multiple spinal fusions)  Required Braces or Orthoses  Cervical: c-collar (At all times when out of bed, ok to remove for hygiene and when resting in bed)  Position Activity Restriction  Other position/activity restrictions: HOB 30 degrees or higher; Pt never to be without pillow,  s/p ACDF C6-C7 and C2-T2 fusion (7/26/23). NO STRAWS. Vitals  Vital Signs  O2 Device: None (Room air)     Subjective  Subjective  Subjective: \"I would like to brush my teetth. \"  Pain Assessment  Pain Assessment: 0-10  Pain Level: 4  Pain Location: Neck    Objective  Cognition  Overall Orientation Status: Impaired  Orientation Level: Oriented to place;Oriented to situation;Oriented to person;Disoriented to time    Cognition  Overall Cognitive Status: Exceptions  Arousal/Alertness: Delayed responses to stimuli  Following Commands:  Follows one step commands consistently  Attention Span: Attends with cues to redirect  Memory: Decreased recall of recent events  Safety Judgement: Decreased awareness of need for assistance;Decreased awareness of need for safety  Problem Solving: Decreased awareness of errors;Assistance required to generate solutions;Assistance required to implement solutions  Insights: Decreased awareness of deficits  Initiation: Requires cues for some  Sequencing: Requires cues for some    Activities of Daily Living  Feeding  Assistance Level: Set-up  Skilled

## 2023-08-06 NOTE — PROGRESS NOTES
Physical Therapy  Facility/Department: [facility] ACUTE REHAB  Rehabilitation Physical Therapy Progress Note    NAME: Maricruz Garcia  : 1950 (68 y.o.)  MRN: [id number]  CODE STATUS: Full Code    Date of Service: 23      Past Medical History:   Diagnosis Date    Allergic rhinitis, cause unspecified     Back pain     lumbar    Bowel obstruction (HCC)     history of due to scar tissue, resolved non-surgically    C. difficile diarrhea     CAD (coronary artery disease)     no stent needed per pt.  Dr. Vesta Ott did cath at      Cardiac murmur     Cellulitis 2017    leg left leg/bug bite    Cerebral artery occlusion with cerebral infarction [facility])     TIA 2014    COVID-19     ONE YR AGO IN 2020 fever and cough    Diverticulosis of colon (without mention of hemorrhage)     GERD (gastroesophageal reflux disease)     on rx    History of blood transfusion     approx     -no reactions    History of CHF (congestive heart failure)     History of MI (myocardial infarction)     thought due to a blood clot    History of ovarian cyst     had oopherectomy holly    History of peritonitis     due to ruptured appendix age 13    HTN (hypertension)     Hx of blood clots     right leg    Hyperlipidemia     Intestinal or peritoneal adhesions with obstruction (postoperative) (postinfection) ([address])     Kidney infection     renal failure/sepsis/spider bite    Lateral epicondylitis  of elbow     MDRO (multiple drug resistant organisms) resistance     c diff    Muscle strain     right posterior shoulder    Other abnormal glucose     PONV (postoperative nausea and vomiting)     dry heaves    Pre-diabetes     Restless legs syndrome (RLS)     Snores     no cpap    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)     TIA (transient ischemic attack)     Under care of service provider 2023    fowevswlfg-ftgqoj-buyxfi-last visit 2023    Under care of service provider 2023    pldewheqt-zhx-yx vincent-last visit may 2023    Uses walker     Vitamin D deficiency     Wears glasses     Wellness examination 07/12/2023    [facility]bdzw[facility] clinic-last visit july 2023     Past Surgical History:   Procedure Laterality Date    ABDOMEN SURGERY  1976    benign tumor removed near remaining ovary, 1.5 pounds    APPENDECTOMY  1968    appendix ruptured, developed peritonitis    BACK SURGERY      BUNIONECTOMY Left     along with calcium deposits removed    CARDIAC CATHETERIZATION  2005    negative    CERVICAL FUSION N/A 05/21/2021    POSTERIOR C3-6 LAMINECTOMY, PARTIAL C7 LAMINECTOMY, FUSION C3-C6, SILVERCORD performed by Oziel Phelps DO at [facility] N/A 7/26/2023    ANTERIOR CERVICAL DISCECTOMY, OSTEOTOMY, FUSION,  C6-7, CORRECTION OF DEFORMITY. performed by Oziel Phelps DO at [address] [place] [address] N/A 7/26/2023    POSTERIOR CERVICAL OSTEOTOMY C6-7, REMOVAL OF PREVIOUS HARDWARE, CERVICAL FUSION C2-T2, CORRECTION OF DEFORMITY.  performed by Oziel Phelps DO at [facility]    12 INCHES REMOVED D/T OBSTRUCTION    COLONOSCOPY      CYST REMOVAL Right     right facial    FINGER CLOSED REDUCTION Left 08/24/2022    CLOSED REDUCTION PINNING LEFT LITTLE FINGER performed by Lashonda Alexandre MD at [facility] ([facility])  1973    taken as a result of recurring cysts    LUMBAR FUSION N/A 02/10/2020    LUMBAR L4-5 POSTERIOR  DECOMPRESSION INSTRUMENTATION FUSION WCEMENT AUGMENTATION/ performed by Hattie Concepcion MD at [address] N/A 06/17/2020    L5-S1 PLIF L4-L5 REVISION performed by Hattie Concepcion MD at [address] [facility] [address]  08/14/2014    FESS    OVARY REMOVAL  1970    UNILATERAL due to cyst    OVARY REMOVAL  1971    partial, due to cyst    SINUS SURGERY  2004    UPPER GASTROINTESTINAL ENDOSCOPY N/A 05/31/2019    EGD ESOPHAGOGASTRODUODENOSCOPY performed by Samm Cheng MD at [address] N/A 08/05/2019    EGD

## 2023-08-06 NOTE — PLAN OF CARE
Problem: Discharge Planning  Goal: Discharge to home or other facility with appropriate resources  Outcome: Progressing  Note: Discharge planning will begin when the patient meets discharge criteria      Problem: Skin/Tissue Integrity  Goal: Absence of new skin breakdown  Description: 1. Monitor for areas of redness and/or skin breakdown  2. Assess vascular access sites hourly  3. Every 4-6 hours minimum:  Change oxygen saturation probe site  4. Every 4-6 hours:  If on nasal continuous positive airway pressure, respiratory therapy assess nares and determine need for appliance change or resting period. 8/6/2023 0056 by Sakshi Haro RN  Outcome: Progressing  Note: Patient is being turned frequently to prevent skin breakdown  8/5/2023 1807 by Yolanda Fernandez RN  Outcome: Progressing  Note: Assess the overall condition of the skin. Check on bony prominences such as the sacrum, trochanters, scapulae, elbows, heels, inner and outer malleolus, inner and outer knees, back of head). Reinforce the importance of turning, mobility, and ambulation. Problem: Safety - Adult  Goal: Free from fall injury  8/6/2023 0056 by Sakshi Haro RN  Outcome: Progressing  Note: Call light within reach,bed alarm on, bed in lowest position and socks on  8/5/2023 1807 by Yolanda Fernandez RN  Outcome: Progressing  Note: Patient remains free of falls and injuries throughout shift. Bed remains in the lowest position, wheels locked, call light and bedside table are within reach.       Problem: Pain  Goal: Verbalizes/displays adequate comfort level or baseline comfort level  Outcome: Progressing  Note: Pain is being managed with rest, repositioning and medication

## 2023-08-07 PROCEDURE — 97116 GAIT TRAINING THERAPY: CPT

## 2023-08-07 PROCEDURE — 99232 SBSQ HOSP IP/OBS MODERATE 35: CPT | Performed by: STUDENT IN AN ORGANIZED HEALTH CARE EDUCATION/TRAINING PROGRAM

## 2023-08-07 PROCEDURE — 92526 ORAL FUNCTION THERAPY: CPT

## 2023-08-07 PROCEDURE — 97542 WHEELCHAIR MNGMENT TRAINING: CPT

## 2023-08-07 PROCEDURE — 99232 SBSQ HOSP IP/OBS MODERATE 35: CPT | Performed by: INTERNAL MEDICINE

## 2023-08-07 PROCEDURE — 97110 THERAPEUTIC EXERCISES: CPT

## 2023-08-07 PROCEDURE — 6370000000 HC RX 637 (ALT 250 FOR IP): Performed by: NURSE PRACTITIONER

## 2023-08-07 PROCEDURE — 97535 SELF CARE MNGMENT TRAINING: CPT

## 2023-08-07 PROCEDURE — 97530 THERAPEUTIC ACTIVITIES: CPT

## 2023-08-07 PROCEDURE — 1180000000 HC REHAB R&B

## 2023-08-07 RX ADMIN — GABAPENTIN 100 MG: 100 CAPSULE ORAL at 07:36

## 2023-08-07 RX ADMIN — APIXABAN 5 MG: 5 TABLET, FILM COATED ORAL at 21:02

## 2023-08-07 RX ADMIN — DOCUSATE SODIUM 100 MG: 50 LIQUID ORAL at 07:36

## 2023-08-07 RX ADMIN — BUSPIRONE HYDROCHLORIDE 5 MG: 10 TABLET ORAL at 14:00

## 2023-08-07 RX ADMIN — LANSOPRAZOLE 15 MG: 15 TABLET, ORALLY DISINTEGRATING, DELAYED RELEASE ORAL at 05:47

## 2023-08-07 RX ADMIN — OXYCODONE HYDROCHLORIDE 5 MG: 5 TABLET ORAL at 13:01

## 2023-08-07 RX ADMIN — OXYCODONE HYDROCHLORIDE 10 MG: 10 TABLET ORAL at 21:02

## 2023-08-07 RX ADMIN — OXYCODONE HYDROCHLORIDE 10 MG: 10 TABLET ORAL at 17:01

## 2023-08-07 RX ADMIN — POLYETHYLENE GLYCOL 3350 17 G: 17 POWDER, FOR SOLUTION ORAL at 07:33

## 2023-08-07 RX ADMIN — CARVEDILOL 12.5 MG: 12.5 TABLET, FILM COATED ORAL at 16:35

## 2023-08-07 RX ADMIN — FUROSEMIDE 40 MG: 40 TABLET ORAL at 07:36

## 2023-08-07 RX ADMIN — BUSPIRONE HYDROCHLORIDE 5 MG: 10 TABLET ORAL at 07:38

## 2023-08-07 RX ADMIN — FUROSEMIDE 40 MG: 40 TABLET ORAL at 16:35

## 2023-08-07 RX ADMIN — OXYCODONE HYDROCHLORIDE 5 MG: 5 TABLET ORAL at 07:35

## 2023-08-07 RX ADMIN — BUSPIRONE HYDROCHLORIDE 5 MG: 10 TABLET ORAL at 21:02

## 2023-08-07 RX ADMIN — GABAPENTIN 100 MG: 100 CAPSULE ORAL at 21:02

## 2023-08-07 RX ADMIN — APIXABAN 5 MG: 5 TABLET, FILM COATED ORAL at 07:36

## 2023-08-07 RX ADMIN — CARVEDILOL 12.5 MG: 12.5 TABLET, FILM COATED ORAL at 07:36

## 2023-08-07 RX ADMIN — CITALOPRAM HYDROBROMIDE 20 MG: 20 TABLET ORAL at 07:36

## 2023-08-07 ASSESSMENT — PAIN SCALES - GENERAL
PAINLEVEL_OUTOF10: 0
PAINLEVEL_OUTOF10: 7
PAINLEVEL_OUTOF10: 0
PAINLEVEL_OUTOF10: 7
PAINLEVEL_OUTOF10: 7
PAINLEVEL_OUTOF10: 5
PAINLEVEL_OUTOF10: 7
PAINLEVEL_OUTOF10: 0
PAINLEVEL_OUTOF10: 0
PAINLEVEL_OUTOF10: 9
PAINLEVEL_OUTOF10: 0

## 2023-08-07 ASSESSMENT — PAIN DESCRIPTION - ORIENTATION: ORIENTATION: LEFT

## 2023-08-07 ASSESSMENT — PAIN DESCRIPTION - LOCATION
LOCATION: SHOULDER;NECK
LOCATION: NECK

## 2023-08-07 NOTE — PROGRESS NOTES
Apparatus: Wheelchair follow  Assistance: Moderate assistance (2nd person for wc follow only)  Quality of Gait: Small inconsistant steps, step to pattern, increased weight shifting to the right. Left knee hyperextends. Noticed left footslightly inverts. Gait Deviations: Slow Alicia;Decreased step length;Decreased step height;Decreased arm swing;Shuffles (NBOS)  Distance: 10' x2  Comments: Patient fatigues quickly, therapeutic rest breaks PRN. More Ambulation?: No    Stairs/Curb  Stairs?: No    Wheelchair Activities  Propulsion: Yes  Propulsion 1  Propulsion: Manual  Level: Level Tile  Method: LUE;RUE  Level of Assistance: Moderate assistance  Description/ Details: short arc motions to prpel wc, slow progression noted, max vcs for further reach back for improved forward arc; poor carryover  Distance: 15'    PT Exercises  PROM Exercises: prolonged gentle stretches to bilat hamstrings and gastroc, left passive stretch to relax tight tissue for improved knee flexion.   A/AROM Exercises: supine bilat SLR, SAQ, heel slides, hip abd x10 reps x2 sets  Resistive Exercises: seated bilat marches, LAQ, hamstring curls, clamshells, hip add, heel/toe raises x10 reps x2 sets 2# weigths/orange tband  Circulation/Endurance Exercises: ankle pumps, quad sets, glut sets  Pressure Relief Exercises: postional changes  Functional Mobility Circuit Training: stand pivot and STS transfer training  Static Sitting Balance Exercises: dangled unsupported on toilet 7 min  Dynamic Sitting Balance Exercises: anterior reaching, postural ex, maxA for anterior/posterior scooting, weight shifting, stabilization ex  Static Standing Balance Exercises: static  parallel bars and at RW ~2 min x3 reps  Dynamic Standing Balance Exercises: weight shifting, postural ex  Postural Correction Exercises: utilizing mirror for visual feedback for upright stands in parallel bars  Motor Control/Coordination: vcs fro smooth eccentric control with incresaed let knee flexion transitioning from stand>sit  Standing Open/Closed Kinetic Chain Exercises: standing marches in parallel bars x10 reps alternating for pre-gait task    ASSESSMENT  Vitals  O2 Device: None (Room air)    Activity Tolerance  Activity Tolerance: Patient tolerated treatment well;Patient limited by pain  Activity Tolerance Comments: L side weakness/deficits from hx of TIA in the past-Left LE slides out during sit<>stand, needs to be blocked during sit<>stnad. GOALS  Patient Goals   Patient Goals : I want to get stronger and move better. Short Term Goals  Time Frame for Short Term Goals: 9  days. Short Term Goal 1: Perform bed mobility with Mod A  Short Term Goal 2: Perform functional transfers with max A with RW  Short Term Goal 3: Pt able to ambulate with rolling walker distance of  20 to 40 ft, min A x 2  Short Term Goal 4: Demo Fair- dynamic standing balance with rolling walker to decrease risk of falls  Short Term Goal 5: Pt able to propel w/c distance fo 30 to 50 ft, min/mod A level surface. Long Term Goals  Time Frame for Long Term Goals : By DC  Long Term Goal 1: Pt able to perform bed mobility min A with use of bed rail. Long Term Goal 2: Pt able to perform fucntional transfers at min A from 18\" or higher surfaces. Long Term Goal 3: Pt able to ambulate with RW, distance of 20 to 50 ft, min A, level surfaces  Long Term Goal 4: Pt able to take 5 to 10 steps outside terraine (parking lot/side walk) with rolling walker, mod A  Long Term Goal 5: Pt able to propel w/ distance upto 50 ft, level surfaces, SBA  Long term goal 6: Pt to demonstrate fair to fair+ technique for HEP for LE to continue at home    [facility]:  minutes of therapy at least 5 out of 7 days a week (1.5 hr/day, 5 to 7 days/week.)  Specific Instructions for Next Treatment: Continue to use natan stedy for transfers, work on sit<>stand and stepping in // bars.   Current Treatment

## 2023-08-07 NOTE — PROGRESS NOTES
[facility]   Acute Rehabilitation Occupational Therapy Daily Treatment Note    Date: 23  Patient Name: Ana Maria Wyatt       Room: 1332/8251-21  MRN: [id number]  Account: [de-identified]   : 1950  (68 y.o.) Gender: female       Referring Practitioner: Dr. Venice Jones  Diagnosis: s/p ACDF C6-C7 and C2-T2 fusion (23)       Treatment Diagnosis: Impaired self-care status    Past Medical History:  has a past medical history of Allergic rhinitis, cause unspecified, Back pain, Bowel obstruction (HCC), C. difficile diarrhea, CAD (coronary artery disease), Cardiac murmur, Cellulitis, Cerebral artery occlusion with cerebral infarction ([address]), COVID-19, Diverticulosis of colon (without mention of hemorrhage), GERD (gastroesophageal reflux disease), History of blood transfusion, History of CHF (congestive heart failure), History of MI (myocardial infarction), History of ovarian cyst, History of peritonitis, HTN (hypertension), Hx of blood clots, Hyperlipidemia, Intestinal or peritoneal adhesions with obstruction (postoperative) (postinfection) ([address]), Kidney infection, Lateral epicondylitis  of elbow, MDRO (multiple drug resistant organisms) resistance, Muscle strain, Other abnormal glucose, PONV (postoperative nausea and vomiting), Pre-diabetes, Restless legs syndrome (RLS), Snores, 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[address]), TIA (transient ischemic attack), Under care of service provider, Under care of service provider, Uses walker, Vitamin D deficiency, Wears glasses, and Wellness examination. Past Surgical History:   has a past surgical history that includes Ovary removal (); colectomy (); Appendectomy (); Ovary removal (); Hysterectomy (); Bunionectomy (Left); sinus surgery (); Colonoscopy; other surgical history (2014); cyst removal (Right); Wrist fracture surgery (Left, 2019);  Upper gastrointestinal endoscopy (N/A, 2019);

## 2023-08-07 NOTE — PROGRESS NOTES
Physical Medicine & Rehabilitation  Progress Note      Subjective:      John Ashraf is a 68 y.o. female with cervical stenosis s/p C6-C7 ACDF, posterior C2-T2 fusion. She reports doing okay today. She does note some ongoing pain in the neck. She denies any other acute concerns. ROS:  Denies fevers, chills, sweats. No chest pain, palpitations, lightheadedness. Denies coughing, wheezing or shortness of breath. Denies abdominal pain, nausea, diarrhea or constipation. No new areas of joint pain. Denies new areas of numbness or weakness. Denies new anxiety or depression issues. No new skin problems. Rehabilitation:     Physical Therapy    Restrictions/Precautions: Surgical Protocols, Fall Risk  Implants present? : Metal implants (multiple spinal fusions.)  Other position/activity restrictions: HOB 30 degrees or higher; Pt never to be without pillow,  s/p ACDF C6-C7 and C2-T2 fusion (7/26/23). NO STRAWS. Required Braces or Orthoses  Cervical: c-collar (at all times when out of bed, ok to remove for hygiene and when resting in bed.)    Bed mobility  Rolling to Left: Maximum assistance  Rolling to Right: Maximum assistance  Supine to Sit: Maximum assistance  Sit to Supine: Maximum assistance (trunk and right LE management)  Scooting: Dependent/Total, 2 Person assistance (supine scooting towards HOB)  Bed Mobility Comments: Patient assisted to bed in this PM secondary to increased pain. Transfers  Sit to Stand: Moderate Assistance  Stand to Sit: Minimal Assistance  Bed to Chair: Dependent/Total Vianney gordillo-2 person assist.)  Stand Pivot Transfers: Dependent/Total Vianney Cordon)  Comment: wc<>parallel bars modA, recliner, wc, toilet<>natan steady CGA. Progressing transfers from [facility] steady>RW as appropriate with pain. WB Status:  (-)  Ambulation  Surface: Level tile  Device: Parallel Bars  Other Apparatus: Wheelchair follow  Assistance:  Moderate assistance (2nd person for wc follow only)  Quality of

## 2023-08-07 NOTE — PLAN OF CARE
Problem: Discharge Planning  Goal: Discharge to home or other facility with appropriate resources  8/7/2023 0312 by Aria Sanford RN  Outcome: Progressing  Note: Patient will begin discharge planning when the patient meets discharge criteria   8/6/2023 1720 by Melodie Read RN  Outcome: Progressing     Problem: Skin/Tissue Integrity  Goal: Absence of new skin breakdown  Description: 1. Monitor for areas of redness and/or skin breakdown  2. Assess vascular access sites hourly  3. Every 4-6 hours minimum:  Change oxygen saturation probe site  4. Every 4-6 hours:  If on nasal continuous positive airway pressure, respiratory therapy assess nares and determine need for appliance change or resting period.   8/6/2023 1720 by Melodie Read RN  Outcome: Progressing     Problem: Safety - Adult  Goal: Free from fall injury  8/7/2023 0312 by Aria Sanford RN  Outcome: Progressing  Note: Call light within reach, bed alarm on, socks on and bed in the lowest position  8/6/2023 1720 by Melodie Read RN  Outcome: Progressing     Problem: ABCDS Injury Assessment  Goal: Absence of physical injury  8/6/2023 1720 by Melodie Read RN  Outcome: Progressing     Problem: Chronic Conditions and Co-morbidities  Goal: Patient's chronic conditions and co-morbidity symptoms are monitored and maintained or improved  8/6/2023 1720 by Melodie Read RN  Outcome: Progressing     Problem: Nutrition Deficit:  Goal: Optimize nutritional status  8/6/2023 1720 by Melodie Read RN  Outcome: Progressing     Problem: Pain  Goal: Verbalizes/displays adequate comfort level or baseline comfort level  8/7/2023 0312 by Aria Sanford RN  Outcome: Progressing  Note: Pain is being managed with rest, repositioning and medication   8/6/2023 1720 by Melodie Read RN  Outcome: Progressing

## 2023-08-07 NOTE — CARE COORDINATION
locomotion:  Ambulation  Comments: Pt unable to progress bilateral LE for ambulation this date. Pt performs 2 standing marches while at chairside maxAx2, pt demonstrating significant difficulty with clearance of RLE. More Ambulation?: No     Current functional status for comprehension: Within Functional Limits     Current functional status for expression: Within Functional Limits     Current functional status for social interaction: Within Functional Limits     Current functional status for problem solving: Within Functional Limits     Current functional status for memory: Within Functional Limits     Current Deficits R/T Impairment: Impaired Functional Mobility and Decreased ADLs     Required Therapy Services:  Physical Therapy: Yes  Occupational Therapy: Yes  Speech Therapy: Yes       Additional Services:    [x] /Case Management    [] Recreational Therapy, as appropriate    [x] Nutrition Consult, as appropriate  [x] Dietary Needs/Preferences: Specialized Dietary Needs/Preferences indicated as follows: NPO - pending swallow study today  [] Dialysis  [x] Cultural/Scientology Needs/Preferences: Cultural/Scientology Needs/Preferences indicated as follows: Scientology  [] Special Equipment Needs  [] Special Medication Needs  [] Other information relevant to patient's care needs:     Preferred Language: English     Does the patient need or want an  to communicate with a doctor or health care staff?  No     Rehab Justification:  Needs 3 hrs therapy per day or 15 hours per week:  Yes  Identified Rehab Nursing needs: Yes  Intense Interdisciplinary need:  Yes  Need for 24 hr physician supervision:  Yes  Measurable improved quality of life:  Yes  Willingness to participate:  Yes  Medical Necessity:  Yes  Patient able to tolerate care proposed:  Yes     Expected Discharge Destination/Functional Level:  Home with assist     Expected length of time to achieve level of improvement: Approximately 2 Weeks  Please Note: Estimated length of stay is based on individual condition and Acute Inpatient Rehabilitation specific needs. Length of stay may vary based upon interdisciplinary team assessment, insurance approval, and patient progression. Expected Post Discharge Treatments: Home with possible Home Care     Acute Inpatient Rehabilitation Disclosure Statement will be provided to patient upon admission to ARU with patient's verbalization of understanding. I have reviewed and concur with the findings and results of the pre-admission screening assessment completed by the Inpatient Rehabilitation Admissions Coordinator.                Cosigned by: Alize Hernandez MD at 8/3/2023  1:07 PM

## 2023-08-07 NOTE — PLAN OF CARE
Problem: Safety - Adult  Goal: Free from fall injury  8/7/2023 1233 by Garcia Cradenas RN  Outcome: Progressing     Problem: ABCDS Injury Assessment  Goal: Absence of physical injury  Outcome: Progressing     Problem: Chronic Conditions and Co-morbidities  Goal: Patient's chronic conditions and co-morbidity symptoms are monitored and maintained or improved  Outcome: Progressing     Problem: Nutrition Deficit:  Goal: Optimize nutritional status  Outcome: Progressing     Problem: Pain  Goal: Verbalizes/displays adequate comfort level or baseline comfort level  8/7/2023 1233 by Garcia Cardenas RN  Outcome: Progressing

## 2023-08-07 NOTE — PATIENT CARE CONFERENCE
[facility] Acute Inpatient Rehabilitation  TEAM CONFERENCE NOTE  Date: 23  Patient Name: Emmy Sandoval       Room: 6528/1184-42  MRN: [id number]       : 1950  (68 y.o.)     Gender: female     Referring Practitioner: Dr Marcin Chou DIAGNOSIS: Spinal stenosis in cervical region    NURSING    Bladder Continence  Always Continent  Bowel Continence Always Continent    Date of Last BM: 23 per patient     Bladder/Bowel Program Interventions: Both Bowel & Bladder Program In Place     Wounds/Incisions/Ulcers: Incision healing well to anterior neck (glue)  Posterior neck- staples and has dry dressing     Pain Control: Patient's pain currently controlled and pain regimen effective as ordered    Pain Medication Regimen Usage Pattern: MAR reviewed and pain medications are being used at the following frequency (Specify Medication, # Doses Administered on average per day, identified patterns of use - for example: time of day, prior to activity/therapy)  Tamera 5mg takes in the morning before therapy     Fall Risk:  Falling star program initiated    Medication Education Program: Patient currently unable to manage medications and responsible party being educated    Discharge Preparation Patient/Responsible Party Education In Progress:   No Educational Needs Identified    Nursing specific communication for TEAM: No additional information identified requiring communication at this time    PHYSICAL THERAPY    Bed Mobility:        Sit to Supine: Maximum assistance (trunk and right LE management)  Scooting: Dependent/Total;2 Person assistance (supine scooting towards HOB)    Transfers:  Sit to Stand: Moderate Assistance  Stand to Sit: Minimal Assistance  Bed to Chair: Dependent/Total Annelise gordillo-2 person assist.)  Have attempted transfers with rolling walker, from right side, mod a x 2.     WB Status:  (-)  Ambulation  Surface: Level tile  Device: Parallel Bars  Other Apparatus: Wheelchair needed? Requires Re-evaluation  Overnight or Day Pass: No  Factors facilitating achievement of predicted outcomes: Family support, Cooperative, and Pleasant  Barriers to the achievement of predicted outcomes: Pain, Decreased endurance, Skin Care, Wound Care, and Medication managment    Functional Goals at discharge:  Predicted Outcome: Home with familyPATIENT'S LEVEL OF ASSISTANCE: Minimal Assistance and Moderate Assistance  Discharge therapy goals:  PT: Long Term Goals  Time Frame for Long Term Goals : By DC  Long Term Goal 1: Pt able to perform bed mobility min A with use of bed rail. Long Term Goal 2: Pt able to perform fucntional transfers at min A from 18\" or higher surfaces. Long Term Goal 3: Pt able to ambulate with RW, distance of 20 to 50 ft, min A, level surfaces  Long Term Goal 4: Pt able to take 5 to 10 steps outside [place] (parking lot/side walk) with rolling walker, mod A  Long Term Goal 5: Pt able to propel w/ distance upto 50 ft, level surfaces, SBA  Long term goal 6: Pt to demonstrate fair to fair+ technique for HEP for LE to continue at home  OT:Long Term Goals  Time Frame for Long Term Goals : By Discharge  Long Term Goal 1: Pt will complete feeding/grooming at Mod I level. Long Term Goal 2: Pt will complete BADL's with Min A and Good safety using AE/modified techniques as needed. Long Term Goal 3: Pt will complete functional transfers with Min A and Good safety using least restrictive device. Long Term Goal 4: Pt will demo IND with appropriate HEP for UB ROM/strength/coordination. Long Term Goal 5: Pt will identify three alternative forms of intimacy that she can engage in with her partner.   ST: diet at least restrictive consistency    Treating Interdisciplinary Team Professionals/Participating Team Members with current knowledge of Aida Presley:  /:   Navin Odom RN  Occupational Therapist:  Otf Lucero OT   Physical Therapist: Adolfo Sanabria

## 2023-08-08 ENCOUNTER — CARE COORDINATION (OUTPATIENT)
Dept: CARE COORDINATION | Age: 73
End: 2023-08-08

## 2023-08-08 PROCEDURE — 97530 THERAPEUTIC ACTIVITIES: CPT

## 2023-08-08 PROCEDURE — 99232 SBSQ HOSP IP/OBS MODERATE 35: CPT | Performed by: STUDENT IN AN ORGANIZED HEALTH CARE EDUCATION/TRAINING PROGRAM

## 2023-08-08 PROCEDURE — 97535 SELF CARE MNGMENT TRAINING: CPT

## 2023-08-08 PROCEDURE — 97542 WHEELCHAIR MNGMENT TRAINING: CPT

## 2023-08-08 PROCEDURE — 92526 ORAL FUNCTION THERAPY: CPT

## 2023-08-08 PROCEDURE — 99232 SBSQ HOSP IP/OBS MODERATE 35: CPT | Performed by: INTERNAL MEDICINE

## 2023-08-08 PROCEDURE — 97116 GAIT TRAINING THERAPY: CPT

## 2023-08-08 PROCEDURE — 6370000000 HC RX 637 (ALT 250 FOR IP): Performed by: NURSE PRACTITIONER

## 2023-08-08 PROCEDURE — 1180000000 HC REHAB R&B

## 2023-08-08 PROCEDURE — 97110 THERAPEUTIC EXERCISES: CPT

## 2023-08-08 RX ORDER — GABAPENTIN 100 MG/1
200 CAPSULE ORAL 2 TIMES DAILY
Status: DISCONTINUED | OUTPATIENT
Start: 2023-08-09 | End: 2023-08-18 | Stop reason: HOSPADM

## 2023-08-08 RX ADMIN — GABAPENTIN 100 MG: 100 CAPSULE ORAL at 20:41

## 2023-08-08 RX ADMIN — OXYCODONE HYDROCHLORIDE 10 MG: 10 TABLET ORAL at 05:42

## 2023-08-08 RX ADMIN — APIXABAN 5 MG: 5 TABLET, FILM COATED ORAL at 20:41

## 2023-08-08 RX ADMIN — OXYCODONE HYDROCHLORIDE 10 MG: 10 TABLET ORAL at 12:24

## 2023-08-08 RX ADMIN — FUROSEMIDE 40 MG: 40 TABLET ORAL at 16:24

## 2023-08-08 RX ADMIN — OXYCODONE HYDROCHLORIDE 10 MG: 10 TABLET ORAL at 20:41

## 2023-08-08 RX ADMIN — CARVEDILOL 12.5 MG: 12.5 TABLET, FILM COATED ORAL at 16:24

## 2023-08-08 RX ADMIN — CITALOPRAM HYDROBROMIDE 20 MG: 20 TABLET ORAL at 07:27

## 2023-08-08 RX ADMIN — BUSPIRONE HYDROCHLORIDE 5 MG: 10 TABLET ORAL at 20:41

## 2023-08-08 RX ADMIN — FUROSEMIDE 40 MG: 40 TABLET ORAL at 07:27

## 2023-08-08 RX ADMIN — LANSOPRAZOLE 15 MG: 15 TABLET, ORALLY DISINTEGRATING, DELAYED RELEASE ORAL at 05:42

## 2023-08-08 RX ADMIN — DOCUSATE SODIUM 100 MG: 50 LIQUID ORAL at 07:27

## 2023-08-08 RX ADMIN — CARVEDILOL 12.5 MG: 12.5 TABLET, FILM COATED ORAL at 07:27

## 2023-08-08 RX ADMIN — GABAPENTIN 100 MG: 100 CAPSULE ORAL at 07:27

## 2023-08-08 RX ADMIN — OXYCODONE HYDROCHLORIDE 10 MG: 10 TABLET ORAL at 16:24

## 2023-08-08 RX ADMIN — APIXABAN 5 MG: 5 TABLET, FILM COATED ORAL at 07:27

## 2023-08-08 RX ADMIN — BUSPIRONE HYDROCHLORIDE 5 MG: 10 TABLET ORAL at 14:33

## 2023-08-08 RX ADMIN — BUSPIRONE HYDROCHLORIDE 5 MG: 10 TABLET ORAL at 07:28

## 2023-08-08 ASSESSMENT — PAIN SCALES - GENERAL
PAINLEVEL_OUTOF10: 0
PAINLEVEL_OUTOF10: 0
PAINLEVEL_OUTOF10: 5
PAINLEVEL_OUTOF10: 0
PAINLEVEL_OUTOF10: 7
PAINLEVEL_OUTOF10: 0
PAINLEVEL_OUTOF10: 7
PAINLEVEL_OUTOF10: 7
PAINLEVEL_OUTOF10: 0

## 2023-08-08 ASSESSMENT — PAIN DESCRIPTION - ORIENTATION: ORIENTATION: ANTERIOR;POSTERIOR

## 2023-08-08 ASSESSMENT — PAIN DESCRIPTION - LOCATION
LOCATION: NECK
LOCATION: NECK;SHOULDER
LOCATION: NECK

## 2023-08-08 ASSESSMENT — PAIN DESCRIPTION - PAIN TYPE: TYPE: SURGICAL PAIN

## 2023-08-08 ASSESSMENT — PAIN DESCRIPTION - DESCRIPTORS
DESCRIPTORS: ACHING;DISCOMFORT;BURNING
DESCRIPTORS: ACHING;DISCOMFORT

## 2023-08-08 NOTE — PLAN OF CARE
Problem: Discharge Planning  Goal: Discharge to home or other facility with appropriate resources  Outcome: Progressing  Note: Patient will be discharged when the patient meets discharge criteria      Problem: Skin/Tissue Integrity  Goal: Absence of new skin breakdown  Description: 1. Monitor for areas of redness and/or skin breakdown  2. Assess vascular access sites hourly  3. Every 4-6 hours minimum:  Change oxygen saturation probe site  4. Every 4-6 hours:  If on nasal continuous positive airway pressure, respiratory therapy assess nares and determine need for appliance change or resting period.   Outcome: Progressing  Note: Patient is being turned in bed to prevent further skin breakdown     Problem: Safety - Adult  Goal: Free from fall injury  8/8/2023 0107 by Fany Wells RN  Outcome: Progressing  Note: Call light within reach, bed alarm on, socks on and bed in lowest position  8/7/2023 1233 by Winda Runner, RN  Outcome: Progressing     Problem: ABCDS Injury Assessment  Goal: Absence of physical injury  8/7/2023 1233 by Winda Runner, RN  Outcome: Progressing     Problem: Chronic Conditions and Co-morbidities  Goal: Patient's chronic conditions and co-morbidity symptoms are monitored and maintained or improved  8/7/2023 1233 by Winda Runner, RN  Outcome: Progressing     Problem: Nutrition Deficit:  Goal: Optimize nutritional status  8/7/2023 1233 by Winda Runner, RN  Outcome: Progressing     Problem: Pain  Goal: Verbalizes/displays adequate comfort level or baseline comfort level  8/8/2023 0107 by Fany Wells RN  Outcome: Progressing  Note: Pain is being managed with rest, repositioning and medication     8/7/2023 1233 by Winda Runner, RN  Outcome: Progressing

## 2023-08-08 NOTE — PLAN OF CARE
Problem: Safety - Adult  Goal: Free from fall injury  8/8/2023 1213 by Dontae Deluna RN  Outcome: Progressing     Problem: ABCDS Injury Assessment  Goal: Absence of physical injury  Outcome: Progressing     Problem: Chronic Conditions and Co-morbidities  Goal: Patient's chronic conditions and co-morbidity symptoms are monitored and maintained or improved  Outcome: Progressing     Problem: Nutrition Deficit:  Goal: Optimize nutritional status  Outcome: Progressing     Problem: Pain  Goal: Verbalizes/displays adequate comfort level or baseline comfort level  8/8/2023 1213 by Dontae Deluna RN  Outcome: Progressing

## 2023-08-08 NOTE — CARE COORDINATION
[facility] Acute Inpatient Rehabilitation  Impairment Group Clarification    Patient Name: Norris Lindquist     PSS:[id number]     [facility]:[birth year](7 y.o.)    PRINCIPAL DIAGNOSIS: Spinal stenosis in cervical region    Impairment Group Clarified with Physical Medicine & Rehabilitation Physician Dr. Fang Stephenson. IMPAIRMENT GROUP: 3.9 Other Neurologic Conditions    Established impairment group approved as documented within the medical record of Norris Lindquist.

## 2023-08-08 NOTE — PROGRESS NOTES
[facility]   Acute Rehabilitation Occupational Therapy Daily Treatment Note    Date: 23  Patient Name: Kimberlee Domínguez       Room: 8332/5821-89  MRN: [id number]  Account: [de-identified]   : 1950  (68 y.o.) Gender: female       Referring Practitioner: Dr. Aaliyah Mcclelland  Diagnosis: s/p ACDF C6-C7 and C2-T2 fusion (23)       Treatment Diagnosis: Impaired self-care status    Past Medical History:  has a past medical history of Allergic rhinitis, cause unspecified, Back pain, Bowel obstruction (HCC), C. difficile diarrhea, CAD (coronary artery disease), Cardiac murmur, Cellulitis, Cerebral artery occlusion with cerebral infarction ([address]), COVID-19, Diverticulosis of colon (without mention of hemorrhage), GERD (gastroesophageal reflux disease), History of blood transfusion, History of CHF (congestive heart failure), History of MI (myocardial infarction), History of ovarian cyst, History of peritonitis, HTN (hypertension), Hx of blood clots, Hyperlipidemia, Intestinal or peritoneal adhesions with obstruction (postoperative) (postinfection) ([address]), Kidney infection, Lateral epicondylitis  of elbow, MDRO (multiple drug resistant organisms) resistance, Muscle strain, Other abnormal glucose, PONV (postoperative nausea and vomiting), Pre-diabetes, Restless legs syndrome (RLS), Snores, 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[address]), TIA (transient ischemic attack), Under care of service provider, Under care of service provider, Uses walker, Vitamin D deficiency, Wears glasses, and Wellness examination. Past Surgical History:   has a past surgical history that includes Ovary removal (); colectomy (); Appendectomy (); Ovary removal (); Hysterectomy (); Bunionectomy (Left); sinus surgery (); Colonoscopy; other surgical history (2014); cyst removal (Right); Wrist fracture surgery (Left, 2019); Upper gastrointestinal endoscopy (N/A, 2019);  Upper gastrointestinal

## 2023-08-08 NOTE — PROGRESS NOTES
Physical Medicine & Rehabilitation  Progress Note      Subjective:      Ana Maria Wyatt is a 68 y.o. female with cervical stenosis s/p C6-C7 ACDF, posterior C2-T2 fusion. She reports doing better today than yesterday. She was feeling down yesterday, which was also noted by nurse and therapists, but states that today has been better thus far. She declines talking with psychiatry at this time. She does note ongoing pain in the neck as well as pain in the limbs, which can feel like \"electrodes. \"  Discussed increasing gabapentin dose. She denies any other acute concerns. ROS:  Denies fevers, chills, sweats. No chest pain, palpitations, lightheadedness. Denies coughing, wheezing or shortness of breath. Denies abdominal pain, nausea, diarrhea or constipation. No new areas of joint pain. Denies new areas of numbness or weakness. Denies new anxiety or depression issues. No new skin problems. Rehabilitation:     Physical Therapy    Restrictions/Precautions: Surgical Protocols, Fall Risk  Implants present? : Metal implants (multiple spinal fusions.)  Other position/activity restrictions: HOB 30 degrees or higher; Pt never to be without pillow,  s/p ACDF C6-C7 and C2-T2 fusion (7/26/23). NO STRAWS. Required Braces or Orthoses  Cervical: c-collar (at all times when out of bed, ok to remove for hygiene and when resting in bed.)    Bed mobility  Rolling to Left: Maximum assistance  Rolling to Right: Maximum assistance  Supine to Sit: Maximum assistance  Sit to Supine: Maximum assistance (trunk and right LE management)  Scooting: Dependent/Total, 2 Person assistance (supine scooting towards HOB)  Bed Mobility Comments: Patient assisted to bed in this PM secondary to increased pain. Transfers  Sit to Stand: Moderate Assistance (wc<>parallel bars)  Stand to Sit: Minimal Assistance (parallel bars to wc)  Bed to Chair: Dependent/Total Irene gordillo-2 person assist.)  Stand Pivot Transfers:  Moderate Assistance, 2

## 2023-08-08 NOTE — PROGRESS NOTES
Physical Therapy  Facility/Department: [facility] ACUTE REHAB  Rehabilitation Physical Therapy Progress Note    NAME: Tony Rosas  : 1950 (68 y.o.)  MRN: [id number]  CODE STATUS: Full Code    Date of Service: 23      Past Medical History:   Diagnosis Date    Allergic rhinitis, cause unspecified     Back pain     lumbar    Bowel obstruction (HCC)     history of due to scar tissue, resolved non-surgically    C. difficile diarrhea     CAD (coronary artery disease)     no stent needed per pt.  Dr. Priest Corporal did cath at      Cardiac murmur     Cellulitis 2017    leg left leg/bug bite    Cerebral artery occlusion with cerebral infarction [facility])     TIA 2014    COVID-19     ONE YR AGO IN 2020 fever and cough    Diverticulosis of colon (without mention of hemorrhage)     GERD (gastroesophageal reflux disease)     on rx    History of blood transfusion     approx     -no reactions    History of CHF (congestive heart failure)     History of MI (myocardial infarction)     thought due to a blood clot    History of ovarian cyst     had oopherectomy holly    History of peritonitis     due to ruptured appendix age 13    HTN (hypertension)     Hx of blood clots     right leg    Hyperlipidemia     Intestinal or peritoneal adhesions with obstruction (postoperative) (postinfection) ([address])     Kidney infection     renal failure/sepsis/spider bite    Lateral epicondylitis  of elbow     MDRO (multiple drug resistant organisms) resistance     c diff    Muscle strain     right posterior shoulder    Other abnormal glucose     PONV (postoperative nausea and vomiting)     dry heaves    Pre-diabetes     Restless legs syndrome (RLS)     Snores     no cpap    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)     TIA (transient ischemic attack)     Under care of service provider 2023    [id number]-last visit 2023    Under care of service provider 2023    [id number] vincent-last training; Endurance training;Gait training;Home exercise program;Safety education & training; Therapeutic activities; Patient/Caregiver education & training;Neuromuscular re-education;Equipment evaluation, education, & procurement;Positioning; Wheelchair mobility training  Safety Devices  Type of Devices: Patient at risk for falls;Gait belt;Left in bed;Call light within reach; Bed alarm in place;Nurse notified; All fall risk precautions in place  Restraints  Restraints Initially in Place: No    EDUCATION  Education  Education Given To: Patient  Education Provided: Plan of Care;Precautions; Safety; Mobility Training;Transfer Training; Fall Prevention Strategies  Education Provided Comments: proper hand/foot placement during transfers.   Education Method: Demonstration;Verbal  Education Outcome: Verbalized understanding;Continued education needed      Therapy Time     08/08/23 0800 08/08/23 1657   PT Individual Minutes   Time In 0800 1354   Time Out 0903 1432   Minutes 61 Tanja Rodney, 08/08/23 at 4:57 PM

## 2023-08-09 PROCEDURE — 97530 THERAPEUTIC ACTIVITIES: CPT

## 2023-08-09 PROCEDURE — 97110 THERAPEUTIC EXERCISES: CPT

## 2023-08-09 PROCEDURE — 6370000000 HC RX 637 (ALT 250 FOR IP): Performed by: NURSE PRACTITIONER

## 2023-08-09 PROCEDURE — 1180000000 HC REHAB R&B

## 2023-08-09 PROCEDURE — 97116 GAIT TRAINING THERAPY: CPT

## 2023-08-09 PROCEDURE — 99232 SBSQ HOSP IP/OBS MODERATE 35: CPT | Performed by: STUDENT IN AN ORGANIZED HEALTH CARE EDUCATION/TRAINING PROGRAM

## 2023-08-09 PROCEDURE — 6370000000 HC RX 637 (ALT 250 FOR IP): Performed by: STUDENT IN AN ORGANIZED HEALTH CARE EDUCATION/TRAINING PROGRAM

## 2023-08-09 PROCEDURE — 99232 SBSQ HOSP IP/OBS MODERATE 35: CPT | Performed by: INTERNAL MEDICINE

## 2023-08-09 RX ADMIN — OXYCODONE HYDROCHLORIDE 10 MG: 10 TABLET ORAL at 05:44

## 2023-08-09 RX ADMIN — CITALOPRAM HYDROBROMIDE 20 MG: 20 TABLET ORAL at 07:29

## 2023-08-09 RX ADMIN — OXYCODONE HYDROCHLORIDE 10 MG: 10 TABLET ORAL at 11:41

## 2023-08-09 RX ADMIN — CARVEDILOL 12.5 MG: 12.5 TABLET, FILM COATED ORAL at 07:29

## 2023-08-09 RX ADMIN — APIXABAN 5 MG: 5 TABLET, FILM COATED ORAL at 07:29

## 2023-08-09 RX ADMIN — GABAPENTIN 200 MG: 100 CAPSULE ORAL at 07:29

## 2023-08-09 RX ADMIN — APIXABAN 5 MG: 5 TABLET, FILM COATED ORAL at 21:00

## 2023-08-09 RX ADMIN — BISACODYL 10 MG: 10 SUPPOSITORY RECTAL at 18:06

## 2023-08-09 RX ADMIN — BUSPIRONE HYDROCHLORIDE 5 MG: 10 TABLET ORAL at 15:17

## 2023-08-09 RX ADMIN — BUSPIRONE HYDROCHLORIDE 5 MG: 10 TABLET ORAL at 07:30

## 2023-08-09 RX ADMIN — CARVEDILOL 12.5 MG: 12.5 TABLET, FILM COATED ORAL at 17:14

## 2023-08-09 RX ADMIN — GABAPENTIN 200 MG: 100 CAPSULE ORAL at 21:00

## 2023-08-09 RX ADMIN — FUROSEMIDE 40 MG: 40 TABLET ORAL at 07:29

## 2023-08-09 RX ADMIN — FUROSEMIDE 40 MG: 40 TABLET ORAL at 17:14

## 2023-08-09 RX ADMIN — LANSOPRAZOLE 15 MG: 15 TABLET, ORALLY DISINTEGRATING, DELAYED RELEASE ORAL at 05:04

## 2023-08-09 RX ADMIN — BUSPIRONE HYDROCHLORIDE 5 MG: 10 TABLET ORAL at 21:00

## 2023-08-09 ASSESSMENT — PAIN DESCRIPTION - DESCRIPTORS
DESCRIPTORS: ACHING;DISCOMFORT
DESCRIPTORS: ACHING;DISCOMFORT
DESCRIPTORS: ACHING;DISCOMFORT;BURNING

## 2023-08-09 ASSESSMENT — PAIN DESCRIPTION - FREQUENCY: FREQUENCY: INTERMITTENT

## 2023-08-09 ASSESSMENT — PAIN DESCRIPTION - ORIENTATION
ORIENTATION: ANTERIOR;POSTERIOR

## 2023-08-09 ASSESSMENT — PAIN SCALES - GENERAL
PAINLEVEL_OUTOF10: 6
PAINLEVEL_OUTOF10: 7
PAINLEVEL_OUTOF10: 0
PAINLEVEL_OUTOF10: 7
PAINLEVEL_OUTOF10: 7

## 2023-08-09 ASSESSMENT — PAIN SCALES - WONG BAKER: WONGBAKER_NUMERICALRESPONSE: 0

## 2023-08-09 ASSESSMENT — PAIN DESCRIPTION - LOCATION
LOCATION: NECK
LOCATION: NECK;SHOULDER
LOCATION: NECK;SHOULDER
LOCATION: NECK

## 2023-08-09 ASSESSMENT — PAIN DESCRIPTION - PAIN TYPE
TYPE: SURGICAL PAIN
TYPE: ACUTE PAIN;SURGICAL PAIN
TYPE: SURGICAL PAIN

## 2023-08-09 ASSESSMENT — PAIN DESCRIPTION - ONSET: ONSET: AWAKENED FROM SLEEP

## 2023-08-09 NOTE — PROGRESS NOTES
[facility]   Acute Rehabilitation Occupational Therapy Daily Treatment Note    Date: 23  Patient Name: Dave Medina       Room: 9675/2702-76  MRN: [id number]  Account: [de-identified]   : 1950  (68 y.o.) Gender: female       Referring Practitioner: Dr. Ganesh Sánchez  Diagnosis: s/p ACDF C6-C7 and C2-T2 fusion (23)       Treatment Diagnosis: Impaired self-care status    Past Medical History:  has a past medical history of Allergic rhinitis, cause unspecified, Back pain, Bowel obstruction (HCC), C. difficile diarrhea, CAD (coronary artery disease), Cardiac murmur, Cellulitis, Cerebral artery occlusion with cerebral infarction ([address]), COVID-19, Diverticulosis of colon (without mention of hemorrhage), GERD (gastroesophageal reflux disease), History of blood transfusion, History of CHF (congestive heart failure), History of MI (myocardial infarction), History of ovarian cyst, History of peritonitis, HTN (hypertension), Hx of blood clots, Hyperlipidemia, Intestinal or peritoneal adhesions with obstruction (postoperative) (postinfection) ([address]), Kidney infection, Lateral epicondylitis  of elbow, MDRO (multiple drug resistant organisms) resistance, Muscle strain, Other abnormal glucose, PONV (postoperative nausea and vomiting), Pre-diabetes, Restless legs syndrome (RLS), Snores, 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[address]), TIA (transient ischemic attack), Under care of service provider, Under care of service provider, Uses walker, Vitamin D deficiency, Wears glasses, and Wellness examination. Past Surgical History:   has a past surgical history that includes Ovary removal (); colectomy (); Appendectomy (); Ovary removal (); Hysterectomy (); Bunionectomy (Left); sinus surgery (); Colonoscopy; other surgical history (2014); cyst removal (Right); Wrist fracture surgery (Left, 2019); Upper gastrointestinal endoscopy (N/A, 2019);  Upper gastrointestinal Per Week: 5-7  Times Per Day: Twice a day  Current Treatment Recommendations: Strengthening, ROM, Balance training, Functional mobility training, Pain management, Safety education & training, Self-Care / ADL, Equipment evaluation, education, & procurement, Patient/Caregiver education & training, Coordination training, Cognitive/Perceptual training, Neuromuscular re-education, Endurance training         08/09/23 1003 08/09/23 1409   OT Individual Minutes   Time In [id number]   DBKB PJP [id number]   [id number]              Electronically signed by PATRICIA Briceno on 8/9/23 at 3:20 PM EDT

## 2023-08-09 NOTE — PATIENT CARE CONFERENCE
[facility] Acute Inpatient Rehabilitation  INTERDISCIPLINARY MEETING  Date: 23  Patient Name: Griselda Sheets       Room: 7684/0186-41  MRN: [id number]       : 1950  (68 y.o.)     Gender: female     Patient's care team, including nursing, speech language pathologist, occupational therapist, and/or physical therapist, met to discuss patient's care needs. Any patient concerns, change in medical status, and current transfer/mobility status were identified at this time. Any Additional Pertinent Information:   Continues to report pain.  OT to clarify  from Neuro surgery regarding C-collar    Participating Team Members:  Nurse:    Clint Sweeney RN     Occupational Therapist: Daniel GOMEZ  Physical Therapist: Tracy Curtis PT  Speech Therapist:  Sima Uribe M.A., [address] [facility]

## 2023-08-09 NOTE — PROGRESS NOTES
Physical Medicine & Rehabilitation  Progress Note      Subjective:      Cena Schwab is a 68 y.o. female with cervical stenosis s/p C6-C7 ACDF, posterior C2-T2 fusion. She reports doing pretty well today. She notes sleeping well last night. Appointment with neurosurgery switched to [facility] for tomorrow. She denies any acute concerns. ROS:  Denies fevers, chills, sweats. No chest pain, palpitations, lightheadedness. Denies coughing, wheezing or shortness of breath. Denies abdominal pain, nausea, diarrhea or constipation. No new areas of joint pain. Denies new areas of numbness or weakness. Denies new anxiety or depression issues. No new skin problems. Rehabilitation:     Physical Therapy    Restrictions/Precautions: Surgical Protocols, Fall Risk  Implants present? : Metal implants (multiple spinal fusions.)  Other position/activity restrictions: HOB 30 degrees or higher; Pt never to be without pillow,  s/p ACDF C6-C7 and C2-T2 fusion (7/26/23). NO STRAWS. Required Braces or Orthoses  Cervical: c-collar (at all times when out of bed, ok to remove for hygiene and when resting in bed.)    Bed mobility  Rolling to Left: Maximum assistance  Rolling to Right: Maximum assistance  Supine to Sit: Moderate assistance  Sit to Supine: Maximum assistance (trunk and right LE management)  Scooting: Maximal assistance (seated scooting towards EOB)  Bed Mobility Comments: Patient completed supine>sit with HOB elevated initially requiring CGA ,however, got stuck just as feet got over the EOB, patient required modA to bring trunk to midline. Transfers  Sit to Stand: Minimal Assistance (wc<>parallel bars; fluctuating between CGA and Jennifer; EOB>Emily Steady SBA)  Stand to Sit: Contact guard assistance (parallel bars to wc)  Bed to Chair: Dependent/Total  Stand Pivot Transfers:  Moderate Assistance, 2 Person Assistance (Reina Vance x2 for first trial wc>NuStep, modA x1 for second trial, NuStpe>wc, completed with RW, max vcs for LE sequencing)  Comment: Emphasis on progressing strength and enduracne completing sit<>stands in parallel bars to progress patient to RW safely. WB Status:  (-)  Ambulation  Surface: Level tile  Device: Parallel Bars  Other Apparatus: Wheelchair follow  Assistance: Minimal assistance (2nd person for wc follow only)  Quality of Gait: Small inconsistant steps, step to pattern, increased weight shifting to the right. Left knee hyperextends. Noticed left footslightly inverts. Gait Deviations: Slow Alicia, Decreased step length, Decreased step height, Decreased arm swing, Shuffles (NBOS)  Distance: 10' x2 reps; 3' 5'  Comments: Patient fatigues quickly, therapeutic rest breaks PRN. Demonstrating improved step legnth and weight shifting this date. More Ambulation?: No      Occupational Therapy    ADL  LE Dressing: Dependent/Total  LE Dressing Skilled Clinical Factors: TA to don socks  Toileting: Dependent/Total  Toileting Skilled Clinical Factors: TA for use of bedpan 2* urgency, mobility not assessed yet and no commode in room  Additional Comments: Limited assessment of ADL's due to late admission, full self-care routine will be completed tomorrow. Instrumental ADL's  Instrumental ADLs: Yes     Balance  Sitting Balance: Contact guard assistance  Standing Balance: Dependent/Total (Ax2 to maintain balance with RW (unsafe at this time), changed to Anitra Alvarez and pt able to briefly stand with CGA, required blocking of L foot 2* sliding forward when seated on paddles)           Transfers  Stand Pivot Transfers: Dependent/Total (1st pivot completed with RW and Max/DEP x2, unsafe technique; transferred back with Anitra Alvarez)  Sit to stand: Moderate assistance, 2 Person assistance (from EOB using RW; Mod A x2 using Anitra Alvarez)  Stand to sit: Contact guard assistance (to sit on EOB from Anitra Alvarez;  Ax2 with unsafe technique when using RW to sit in w/c, pt sat down too early/unable to control descent)  Transfer

## 2023-08-09 NOTE — CARE COORDINATION
ARU CASE MANAGEMENT NOTE:    Patient is alert and oriented x4. Spoke with patient regarding discharge plan: Return home with spouse and would like to have [employer]. Outside appointments while in ARU: Virtual appointment 08/10 at 3:30 pm with JEN Nascimento. Will continue to follow for additional discharge needs.

## 2023-08-09 NOTE — PLAN OF CARE
or comorbid symptoms are exacerbated and prevent overall improvement and discharge     Problem: Pain  Goal: Verbalizes/displays adequate comfort level or baseline comfort level  Outcome: Progressing  Flowsheets (Taken 8/9/2023 0993)  Verbalizes/displays adequate comfort level or baseline comfort level:   Encourage patient to monitor pain and request assistance   Assess pain using appropriate pain scale   Administer analgesics based on type and severity of pain and evaluate response   Implement non-pharmacological measures as appropriate and evaluate response   Consider cultural and social influences on pain and pain management   Notify Licensed Independent Practitioner if interventions unsuccessful or patient reports new pain

## 2023-08-09 NOTE — PROGRESS NOTES
Physical Therapy  Facility/Department: [facility]  Rehabilitation Physical Therapy Progress Note    NAME: Radha Feliz  : 1950 (68 y.o.)  MRN: [id number]  CODE STATUS: Full Code    Date of Service: 23      Past Medical History:   Diagnosis Date    Allergic rhinitis, cause unspecified     Back pain     lumbar    Bowel obstruction (HCC)     history of due to scar tissue, resolved non-surgically    C. difficile diarrhea     CAD (coronary artery disease)     no stent needed per pt.  Dr. Sonia Smith did cath at      Cardiac murmur     Cellulitis 2017    leg left leg/bug bite    Cerebral artery occlusion with cerebral infarction [facility])     TIA 2014    COVID-19     ONE YR AGO IN 2020 fever and cough    Diverticulosis of colon (without mention of hemorrhage)     GERD (gastroesophageal reflux disease)     on rx    History of blood transfusion     approx     -no reactions    History of CHF (congestive heart failure)     History of MI (myocardial infarction)     thought due to a blood clot    History of ovarian cyst     had oopherectomy holly    History of peritonitis     due to ruptured appendix age 13    HTN (hypertension)     Hx of blood clots     right leg    Hyperlipidemia     Intestinal or peritoneal adhesions with obstruction (postoperative) (postinfection) ([address])     Kidney infection     renal failure/sepsis/spider bite    Lateral epicondylitis  of elbow     MDRO (multiple drug resistant organisms) resistance     c diff    Muscle strain     right posterior shoulder    Other abnormal glucose     PONV (postoperative nausea and vomiting)     dry heaves    Pre-diabetes     Restless legs syndrome (RLS)     Snores     no cpap    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)     TIA (transient ischemic attack)     Under care of service provider 2023    [id number]-last visit 2023    Under care of service provider 2023    [id number] vincent-last

## 2023-08-10 ENCOUNTER — TELEMEDICINE (OUTPATIENT)
Dept: NEUROSURGERY | Age: 73
End: 2023-08-10

## 2023-08-10 DIAGNOSIS — Z98.1 S/P CERVICAL SPINAL FUSION: ICD-10-CM

## 2023-08-10 DIAGNOSIS — M40.12 OTHER SECONDARY KYPHOSIS, CERVICAL REGION: Primary | ICD-10-CM

## 2023-08-10 DIAGNOSIS — M50.30 CERVICAL SPINAL STENOSIS DUE TO ADJACENT SEGMENT DISEASE AFTER FUSION PROCEDURE: ICD-10-CM

## 2023-08-10 DIAGNOSIS — M48.02 CERVICAL SPINAL STENOSIS DUE TO ADJACENT SEGMENT DISEASE AFTER FUSION PROCEDURE: ICD-10-CM

## 2023-08-10 PROCEDURE — 97110 THERAPEUTIC EXERCISES: CPT

## 2023-08-10 PROCEDURE — 97535 SELF CARE MNGMENT TRAINING: CPT

## 2023-08-10 PROCEDURE — 97542 WHEELCHAIR MNGMENT TRAINING: CPT

## 2023-08-10 PROCEDURE — 1180000000 HC REHAB R&B

## 2023-08-10 PROCEDURE — 6370000000 HC RX 637 (ALT 250 FOR IP): Performed by: NURSE PRACTITIONER

## 2023-08-10 PROCEDURE — 97116 GAIT TRAINING THERAPY: CPT

## 2023-08-10 PROCEDURE — 97530 THERAPEUTIC ACTIVITIES: CPT

## 2023-08-10 PROCEDURE — 99232 SBSQ HOSP IP/OBS MODERATE 35: CPT | Performed by: INTERNAL MEDICINE

## 2023-08-10 PROCEDURE — 6370000000 HC RX 637 (ALT 250 FOR IP): Performed by: STUDENT IN AN ORGANIZED HEALTH CARE EDUCATION/TRAINING PROGRAM

## 2023-08-10 PROCEDURE — 92526 ORAL FUNCTION THERAPY: CPT

## 2023-08-10 PROCEDURE — 99024 POSTOP FOLLOW-UP VISIT: CPT | Performed by: NURSE PRACTITIONER

## 2023-08-10 RX ADMIN — CARVEDILOL 12.5 MG: 12.5 TABLET, FILM COATED ORAL at 17:25

## 2023-08-10 RX ADMIN — FUROSEMIDE 40 MG: 40 TABLET ORAL at 08:17

## 2023-08-10 RX ADMIN — GABAPENTIN 200 MG: 100 CAPSULE ORAL at 20:20

## 2023-08-10 RX ADMIN — LANSOPRAZOLE 15 MG: 15 TABLET, ORALLY DISINTEGRATING, DELAYED RELEASE ORAL at 05:08

## 2023-08-10 RX ADMIN — CARVEDILOL 12.5 MG: 12.5 TABLET, FILM COATED ORAL at 08:17

## 2023-08-10 RX ADMIN — APIXABAN 5 MG: 5 TABLET, FILM COATED ORAL at 08:18

## 2023-08-10 RX ADMIN — FUROSEMIDE 40 MG: 40 TABLET ORAL at 17:25

## 2023-08-10 RX ADMIN — OXYCODONE HYDROCHLORIDE 5 MG: 5 TABLET ORAL at 20:20

## 2023-08-10 RX ADMIN — ACETAMINOPHEN 650 MG: 325 TABLET ORAL at 20:19

## 2023-08-10 RX ADMIN — POLYETHYLENE GLYCOL 3350 17 G: 17 POWDER, FOR SOLUTION ORAL at 08:17

## 2023-08-10 RX ADMIN — DOCUSATE SODIUM 100 MG: 50 LIQUID ORAL at 08:17

## 2023-08-10 RX ADMIN — GABAPENTIN 200 MG: 100 CAPSULE ORAL at 08:17

## 2023-08-10 RX ADMIN — CITALOPRAM HYDROBROMIDE 20 MG: 20 TABLET ORAL at 08:17

## 2023-08-10 RX ADMIN — BUSPIRONE HYDROCHLORIDE 5 MG: 10 TABLET ORAL at 08:19

## 2023-08-10 RX ADMIN — BUSPIRONE HYDROCHLORIDE 5 MG: 10 TABLET ORAL at 20:27

## 2023-08-10 RX ADMIN — BUSPIRONE HYDROCHLORIDE 5 MG: 10 TABLET ORAL at 14:31

## 2023-08-10 RX ADMIN — OXYCODONE HYDROCHLORIDE 5 MG: 5 TABLET ORAL at 13:09

## 2023-08-10 RX ADMIN — APIXABAN 5 MG: 5 TABLET, FILM COATED ORAL at 20:20

## 2023-08-10 ASSESSMENT — PAIN - FUNCTIONAL ASSESSMENT: PAIN_FUNCTIONAL_ASSESSMENT: ACTIVITIES ARE NOT PREVENTED

## 2023-08-10 ASSESSMENT — PAIN DESCRIPTION - DESCRIPTORS: DESCRIPTORS: ACHING

## 2023-08-10 ASSESSMENT — PAIN DESCRIPTION - LOCATION
LOCATION: HAND
LOCATION: NECK;SHOULDER

## 2023-08-10 ASSESSMENT — PAIN SCALES - GENERAL
PAINLEVEL_OUTOF10: 6
PAINLEVEL_OUTOF10: 10
PAINLEVEL_OUTOF10: 2

## 2023-08-10 ASSESSMENT — PAIN DESCRIPTION - ORIENTATION
ORIENTATION: RIGHT;LEFT
ORIENTATION: RIGHT

## 2023-08-10 NOTE — PLAN OF CARE
Problem: Discharge Planning  Goal: Discharge to home or other facility with appropriate resources  8/10/2023 1237 by Amna Mckeon RN  Outcome: Progressing  Flowsheets (Taken 8/10/2023 4152)  Discharge to home or other facility with appropriate resources: Identify barriers to discharge with patient and caregiver   Pt to discharge home vs. SNF once medically stable. Problem: Skin/Tissue Integrity  Goal: Absence of new skin breakdown  Description: 1. Monitor for areas of redness and/or skin breakdown  2. Assess vascular access sites hourly  3. Every 4-6 hours minimum:  Change oxygen saturation probe site  4. Every 4-6 hours:  If on nasal continuous positive airway pressure, respiratory therapy assess nares and determine need for appliance change or resting period. 8/10/2023 1237 by Amna Mckeon RN  Outcome: Progressing   No new skin issues noted this shift. Problem: Safety - Adult  Goal: Free from fall injury  8/10/2023 1237 by Amna Mckeon RN  Outcome: Progressing  Flowsheets (Taken 8/10/2023 0906)  Free From Fall Injury: Instruct family/caregiver on patient safety   No injury noted this shift. Problem: ABCDS Injury Assessment  Goal: Absence of physical injury  8/10/2023 1237 by Amna Mckeon RN  Outcome: Progressing  Flowsheets (Taken 8/10/2023 0906)  Absence of Physical Injury: Implement safety measures based on patient assessment   No physical injuries noted this shift. Problem: Chronic Conditions and Co-morbidities  Goal: Patient's chronic conditions and co-morbidity symptoms are monitored and maintained or improved  8/10/2023 1237 by Amna Mckeon RN  Outcome: Progressing  Flowsheets (Taken 8/10/2023 8808)  Care Plan - Patient's Chronic Conditions and Co-Morbidity Symptoms are Monitored and Maintained or Improved: Monitor and assess patient's chronic conditions and comorbid symptoms for stability, deterioration, or improvement   Monitoring.    Problem: Nutrition Deficit:  Goal: Optimize

## 2023-08-10 NOTE — PLAN OF CARE
Problem: Discharge Planning  Goal: Discharge to home or other facility with appropriate resources  Outcome: Progressing  Flowsheets (Taken 8/10/2023 0550)  Discharge to home or other facility with appropriate resources:   Identify barriers to discharge with patient and caregiver   Arrange for needed discharge resources and transportation as appropriate   Identify discharge learning needs (meds, wound care, etc)   Refer to discharge planning if patient needs post-hospital services based on physician order or complex needs related to functional status, cognitive ability or social support system     Problem: Skin/Tissue Integrity  Goal: Absence of new skin breakdown  Description: 1. Monitor for areas of redness and/or skin breakdown  2. Assess vascular access sites hourly  3. Every 4-6 hours minimum:  Change oxygen saturation probe site  4. Every 4-6 hours:  If on nasal continuous positive airway pressure, respiratory therapy assess nares and determine need for appliance change or resting period.   Outcome: Progressing     Problem: Safety - Adult  Goal: Free from fall injury  Outcome: Progressing     Problem: ABCDS Injury Assessment  Goal: Absence of physical injury  Outcome: Progressing  Flowsheets (Taken 8/10/2023 0550)  Absence of Physical Injury: Implement safety measures based on patient assessment     Problem: Chronic Conditions and Co-morbidities  Goal: Patient's chronic conditions and co-morbidity symptoms are monitored and maintained or improved  Outcome: Progressing  Flowsheets (Taken 8/10/2023 0550)  Care Plan - Patient's Chronic Conditions and Co-Morbidity Symptoms are Monitored and Maintained or Improved:   Monitor and assess patient's chronic conditions and comorbid symptoms for stability, deterioration, or improvement   Collaborate with multidisciplinary team to address chronic and comorbid conditions and prevent exacerbation or deterioration   Update acute care plan with appropriate goals if

## 2023-08-10 NOTE — CARE COORDINATION
ARU CASE MANAGEMENT NOTE:    Patient is alert and oriented x4. Spoke with patient regarding discharge plan: Return home with spouse and have P[employer]. Outside appointments while in ARU: Virtual appointment today 08/10 at 3:30 pm with JEN Bloom. Patient and RN aware of appointment. Will continue to follow for additional discharge needs.

## 2023-08-10 NOTE — PROGRESS NOTES
Video conference completed with Tk Rosales CNP for neurovascular surgery. Per CHRISTOS Kimbrough, patient may remove aspen collar for 15 min intervals- while eating and for hygiene. Pt may remove while sleeping or in bed. Pt is to maintain collar for 8 weeks. Also, patient site looks good per CHRISTOS Alcaraz. Ok to remove staples. RN noted 13 in back of neck. Leave SHWETA. Follow up appointment is September 26th. During the week prior, patient is to have xray. CHRISTOS Jacks to place order in.

## 2023-08-10 NOTE — PROGRESS NOTES
collar for 15 min intervals- while eating and for hygiene. Pt may remove while sleeping or in bed. Pt is to maintain collar for 8 weeks. SUBJECTIVE  Subjective: Patient pleasant and cooperative. Pain: Reports pain as tolerable reporting just getting pain meds. Prior Level of Function:  Social/Functional History  Lives With: Spouse  Type of Home: House  Home Layout: One level  Home Access: Level entry  Bathroom Shower/Tub: Tub/Shower unit  Bathroom Toilet: Handicap height  Bathroom Equipment: Grab bars in shower, Shower chair, Grab bars around toilet  Bathroom Accessibility: Accessible (Has to park walker outside, uses environment for support when she walks in (sink/GB/etc.))  Home Equipment: [address], Sock aid, Rollator, Reacher, Long-handled shoehorn, Lift chair, Hospital bed, Alert Button, Walker, rolling  Has the patient had two or more falls in the past year or any fall with injury in the past year?: Yes (~10 in the last year (reports needing a second back surgery because she \"ruined\" the first surgery by falling))  Receives Help From: Neighbor, Family  ADL Assistance: Needs assistance (Neighbor A with shower 2x week; Pt does dressing Mod I with AE and completes toileting Mod I)  Homemaking Assistance: Needs assistance (Neighbor//daughter take care of homemaking tasks;  Pt reports \"it's really getting to her \", goes onto say that  states the Bible says \"a  isn't supposed to have to take care of his wife\".)  Homemaking Responsibilities: No  Ambulation Assistance: Independent (Using RW)  Transfer Assistance: Independent (except in/out of tub)  Active : No  Patient's  Info:  drives  Mode of Transportation: SUV  Occupation: Retired  Type of Occupation: hairdresser  Leisure & Hobbies: gambling  IADL Comments: Pt reports that she sleeps in her lift chair  Additional Comments: Pt reports that her  is retired, daughter works \"goofy shifts\" so she comes ~10 min  Dynamic Standing Balance Exercises: weight shifting, postural ex  Postural Correction Exercises: utilizing mirror for visual feedback for upright stands in parallel bars; max tactile and vcs to soften bilat knee joints as patient hyperextends bilat knees resulting in misalignment of joints, noted trunk flexion at hips and posterior weight shifting. Motor Control/Coordination: vcs fro smooth eccentric control with incresaed left knee flexion transitioning from stand>sit; good control and carryover of vcs  Standing Open/Closed Kinetic Chain Exercises: standing marches, hamstring curls, heel raises, hip flexion in parallel bars x10 reps alternating for pre-gait task  Exercise Equipment: NuStep 15 min L3 with emphasis on completing available range; legs and left UE only    ASSESSMENT  Vitals  O2 Device: None (Room air)    Activity Tolerance  Activity Tolerance: Patient tolerated treatment well;Patient limited by pain  Activity Tolerance Comments: L side weakness/deficits from hx of TIA in the past-Left LE slides out during sit<>stand, needs to be blocked during sit<>stnad. GOALS  Patient Goals   Patient Goals : I want to get stronger and move better. Short Term Goals  Time Frame for Short Term Goals: 9  days. Short Term Goal 1: Perform bed mobility with Mod A  Short Term Goal 2: Perform functional transfers with max A with RW  Short Term Goal 3: Pt able to ambulate with rolling walker distance of  20 to 40 ft, min A x 2  Short Term Goal 4: Demo Fair- dynamic standing balance with rolling walker to decrease risk of falls  Short Term Goal 5: Pt able to propel w/c distance fo 30 to 50 ft, min/mod A level surface. Long Term Goals  Time Frame for Long Term Goals : By DC  Long Term Goal 1: Pt able to perform bed mobility min A with use of bed rail. Long Term Goal 2: Pt able to perform fucntional transfers at min A from 18\" or higher surfaces.   Long Term Goal 3: Pt able to ambulate with RW, distance of 20 to

## 2023-08-10 NOTE — PROGRESS NOTES
[facility]   Acute Rehabilitation Occupational Therapy Daily Treatment Note    Date: 8/10/23  Patient Name: Munira Otto       Room: 9073/2799-21  MRN: [id number]  Account: [de-identified]   : 1950  (68 y.o.) Gender: female       Referring Practitioner: Dr. Tiana Rose  Diagnosis: s/p ACDF C6-C7 and C2-T2 fusion (23)       Treatment Diagnosis: Impaired self-care status    Past Medical History:  has a past medical history of Allergic rhinitis, cause unspecified, Back pain, Bowel obstruction (HCC), C. difficile diarrhea, CAD (coronary artery disease), Cardiac murmur, Cellulitis, Cerebral artery occlusion with cerebral infarction ([address]), COVID-19, Diverticulosis of colon (without mention of hemorrhage), GERD (gastroesophageal reflux disease), History of blood transfusion, History of CHF (congestive heart failure), History of MI (myocardial infarction), History of ovarian cyst, History of peritonitis, HTN (hypertension), Hx of blood clots, Hyperlipidemia, Intestinal or peritoneal adhesions with obstruction (postoperative) (postinfection) ([address]), Kidney infection, Lateral epicondylitis  of elbow, MDRO (multiple drug resistant organisms) resistance, Muscle strain, Other abnormal glucose, PONV (postoperative nausea and vomiting), Pre-diabetes, Restless legs syndrome (RLS), Snores, 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[address]), TIA (transient ischemic attack), Under care of service provider, Under care of service provider, Uses walker, Vitamin D deficiency, Wears glasses, and Wellness examination. Past Surgical History:   has a past surgical history that includes Ovary removal (); colectomy (); Appendectomy (); Ovary removal (); Hysterectomy (); Bunionectomy (Left); sinus surgery (); Colonoscopy; other surgical history (2014); cyst removal (Right); Wrist fracture surgery (Left, 2019); Upper gastrointestinal endoscopy (N/A, 2019);  Upper gastrointestinal seated w/c sink level for face washing and oral hygiene    Upper Extremity Bathing  Assistance Level: Stand by assist  Skilled Clinical Factors: pt keeping c-collar on- seated in w/c to cleanse chest,abdomen, and BUEs    Lower Extremity Bathing  Equipment Provided: Reachers;Long-handled sponge  Assistance Level: Minimal assistance  Skilled Clinical Factors: seated in w/c to cleanse BLEs with LHS and for brice hygiene, assist for buttock care CGA using wall GB by toilet for standing balance    Upper Extremity Dressing  Assistance Level: Moderate assistance  Skilled Clinical Factors: A to don c-collar prior to OOB activites-able to doff shirt with no assist,  Pt able to thread BUEs, Assist to donnn over head, A to manage under c-collar and shirt adjustment down trunk    Lower Extremity Dressing  Equipment Provided: Reachers  Assistance Level: Moderate assistance  Skilled Clinical Factors: A to manage over hips using GB and CGA for balance, while seated pt able to thread/unthread clothing with increased time    Putting On/Taking Off Footwear  Assistance Level: Maximum assistance  Skilled Clinical Factors: TA for donning TEDs, mod-max A for donning slip on shoes. Toileting  Assistance Level: Maximum assistance  Skilled Clinical Factors: A for clothing mgt, and hygeine post BM    Toilet Transfers  Technique: Stand pivot; To the left; To the right  Equipment: Standard toilet  Additional Factors: Verbal cues;Cues for hand placement; Increased time to complete  Assistance Level: Minimal assistance  Skilled Clinical Factors: MIN A sit<>stand using GB while LLE blocked d/t difficulty maintain LLE in flexion, Once in stance pt CGA for SPT with verbal cues for sequencing and foot placement during t/f -increased time req for completion                                                 Mobility                            Sit to Stand  Assistance Level: Minimal assistance  Skilled Clinical Factors: using GB by toilet to pull self

## 2023-08-10 NOTE — PROGRESS NOTES
item  -- DELETE ALL ITEMS NOT EXAMINED]  Vital Signs: (As obtained by patient/caregiver at home)    Constitutional: [x] Appears well-developed and well-nourished [x] No apparent distress      [] Abnormal   Mental status  [x] Alert and awake  [x] Oriented to person/place/time [x]Able to follow commands        Eyes:  EOM    [x]  Normal  [] Abnormal-  Sclera  [x]  Normal  [] Abnormal -         Discharge [x]  None visible  [] Abnormal -    HENT:   [x] Normocephalic, atraumatic. [] Abnormal   [x] Mouth/Throat: Mucous membranes are moist.     External Ears [x] Normal  [] Abnormal-    Neck: [x] No visualized mass     Pulmonary/Chest: [x] Respiratory effort normal.  [x] No visualized signs of difficulty breathing or respiratory distress        [] Abnormal      Musculoskeletal:   [] Normal gait with no signs of ataxia-not observed during virtual visit         [x] Normal range of motion of neck        [] Abnormal       Neurological:        [x] No Facial Asymmetry (Cranial nerve 7 motor function) (limited exam to video visit)          [x] No gaze palsy        [] Abnormal         Skin:        [x] No significant exanthematous lesions or discoloration noted on facial skin         [] Abnormal            Psychiatric:       [x] Normal Affect [] Abnormal        [x] No Hallucinations      Due to this being a TeleHealth encounter, evaluation of the following organ systems is limited: Vitals/Constitutional/EENT/Resp/CV/GI//MS/Neuro/Skin/Heme-Lymph-Imm. ASSESSMENT/PLAN:   Diagnosis Orders   1. Other secondary kyphosis, cervical region  XR CERVICAL SPINE (4-5 VIEWS)      2. Cervical spinal stenosis due to adjacent segment disease after fusion procedure  XR CERVICAL SPINE (4-5 VIEWS)      3. S/P cervical spinal fusion  XR CERVICAL SPINE (4-5 VIEWS)          Doing very well postoperatively. Incisions healing well. Okay to remove staples from posterior incision.   Continue cervical collar when out of bed, okay to remove to eat, sleep

## 2023-08-11 ENCOUNTER — APPOINTMENT (OUTPATIENT)
Dept: GENERAL RADIOLOGY | Age: 73
End: 2023-08-11
Attending: STUDENT IN AN ORGANIZED HEALTH CARE EDUCATION/TRAINING PROGRAM
Payer: MEDICARE

## 2023-08-11 PROCEDURE — 99232 SBSQ HOSP IP/OBS MODERATE 35: CPT | Performed by: INTERNAL MEDICINE

## 2023-08-11 PROCEDURE — 97150 GROUP THERAPEUTIC PROCEDURES: CPT

## 2023-08-11 PROCEDURE — 74230 X-RAY XM SWLNG FUNCJ C+: CPT

## 2023-08-11 PROCEDURE — 97530 THERAPEUTIC ACTIVITIES: CPT

## 2023-08-11 PROCEDURE — 97110 THERAPEUTIC EXERCISES: CPT

## 2023-08-11 PROCEDURE — 92611 MOTION FLUOROSCOPY/SWALLOW: CPT

## 2023-08-11 PROCEDURE — 1180000000 HC REHAB R&B

## 2023-08-11 PROCEDURE — 97535 SELF CARE MNGMENT TRAINING: CPT

## 2023-08-11 PROCEDURE — 99231 SBSQ HOSP IP/OBS SF/LOW 25: CPT | Performed by: STUDENT IN AN ORGANIZED HEALTH CARE EDUCATION/TRAINING PROGRAM

## 2023-08-11 PROCEDURE — 6370000000 HC RX 637 (ALT 250 FOR IP): Performed by: NURSE PRACTITIONER

## 2023-08-11 PROCEDURE — 97116 GAIT TRAINING THERAPY: CPT

## 2023-08-11 PROCEDURE — 6370000000 HC RX 637 (ALT 250 FOR IP): Performed by: STUDENT IN AN ORGANIZED HEALTH CARE EDUCATION/TRAINING PROGRAM

## 2023-08-11 PROCEDURE — 99232 SBSQ HOSP IP/OBS MODERATE 35: CPT | Performed by: STUDENT IN AN ORGANIZED HEALTH CARE EDUCATION/TRAINING PROGRAM

## 2023-08-11 RX ADMIN — APIXABAN 5 MG: 5 TABLET, FILM COATED ORAL at 21:21

## 2023-08-11 RX ADMIN — FUROSEMIDE 40 MG: 40 TABLET ORAL at 17:28

## 2023-08-11 RX ADMIN — BUSPIRONE HYDROCHLORIDE 5 MG: 10 TABLET ORAL at 07:39

## 2023-08-11 RX ADMIN — CITALOPRAM HYDROBROMIDE 20 MG: 20 TABLET ORAL at 07:37

## 2023-08-11 RX ADMIN — CARVEDILOL 12.5 MG: 12.5 TABLET, FILM COATED ORAL at 17:28

## 2023-08-11 RX ADMIN — OXYCODONE HYDROCHLORIDE 10 MG: 10 TABLET ORAL at 07:39

## 2023-08-11 RX ADMIN — LANSOPRAZOLE 15 MG: 15 TABLET, ORALLY DISINTEGRATING, DELAYED RELEASE ORAL at 05:50

## 2023-08-11 RX ADMIN — BUSPIRONE HYDROCHLORIDE 5 MG: 10 TABLET ORAL at 21:21

## 2023-08-11 RX ADMIN — FUROSEMIDE 40 MG: 40 TABLET ORAL at 07:37

## 2023-08-11 RX ADMIN — CARVEDILOL 12.5 MG: 12.5 TABLET, FILM COATED ORAL at 07:37

## 2023-08-11 RX ADMIN — GABAPENTIN 200 MG: 100 CAPSULE ORAL at 21:21

## 2023-08-11 RX ADMIN — OXYCODONE HYDROCHLORIDE 5 MG: 5 TABLET ORAL at 14:31

## 2023-08-11 RX ADMIN — APIXABAN 5 MG: 5 TABLET, FILM COATED ORAL at 07:37

## 2023-08-11 RX ADMIN — GABAPENTIN 200 MG: 100 CAPSULE ORAL at 07:37

## 2023-08-11 RX ADMIN — BUSPIRONE HYDROCHLORIDE 5 MG: 10 TABLET ORAL at 14:31

## 2023-08-11 ASSESSMENT — PAIN SCALES - GENERAL: PAINLEVEL_OUTOF10: 5

## 2023-08-11 NOTE — PROGRESS NOTES
Comprehensive Nutrition Assessment    Type and Reason for Visit:  Reassess    Nutrition Recommendations/Plan:   Will continue to provide Easy to [employer]. Malnutrition Assessment:  Malnutrition Status:  No malnutrition (08/04/23 1528)    Context:  Acute Illness     Findings of the 6 clinical characteristics of malnutrition:  Energy Intake:  No significant decrease in energy intake (per pt's report)  Weight Loss:  No significant weight loss     Body Fat Loss:  No significant body fat loss     Muscle Mass Loss:  No significant muscle mass loss    Fluid Accumulation:   (mild to moderate) Generalized   Strength:       Nutrition Assessment:    Pt is meeting nutrition needs with po intake greater than 75%. Nutrition Related Findings:    mild edema generalized/all extrmities, Labs/Meds: Reviewed, BM 8/10 Wound Type: Surgical Incision       Current Nutrition Intake & Therapies:    Average Meal Intake: %     ADULT DIET; Easy to Chew    Anthropometric Measures:  Height: 5' 3\" (160 cm)  Ideal Body Weight (IBW): 115 lbs (52 kg)    Admission Body Weight: 190 lb (86.2 kg)  Current Body Weight: 190 lb (86.2 kg), 165.2 % IBW. Weight Source: Not Specified  Current BMI (kg/m2): 33.7                          BMI Categories: Obese Class 1 (BMI 30.0-34. 9)    Estimated Daily Nutrient Needs:  Energy Requirements Based On: Formula  Weight Used for Energy Requirements: Admission  Energy (kcal/day): 5368-2115 kcals based on [facility] with 1.3-1.4 factor  Weight Used for Protein Requirements: Ideal  Protein (g/day): 73-83 gm protein based on 1.4-1.6 gm/kg       Nutrition Diagnosis:   Increased nutrient needs related to other (comment) (healing) as evidenced by wounds    Nutrition Interventions:   Food and/or Nutrient Delivery: Continue Current Diet  Nutrition Education/Counseling: No recommendation at this time  Coordination of Nutrition Care: Continue to monitor while inpatient, Speech Therapy       Goals:  Previous

## 2023-08-11 NOTE — PROGRESS NOTES
[facility]: SAROJ HINTON   Acute Rehabilitation Occupational Therapy Daily Treatment Note    Date: 23  Patient Name: Libby Fernandez       Room: 9158/3697-56  MRN: [id number]  Account: [de-identified]   : 1950  (68 y.o.) Gender: female       Referring Practitioner: Dr. Jodee Khoury  Diagnosis: s/p ACDF C6-C7 and C2-T2 fusion (23)       Treatment Diagnosis: Impaired self-care status    Past Medical History:  has a past medical history of Allergic rhinitis, cause unspecified, Back pain, Bowel obstruction (HCC), C. difficile diarrhea, CAD (coronary artery disease), Cardiac murmur, Cellulitis, Cerebral artery occlusion with cerebral infarction ([address]), COVID-19, Diverticulosis of colon (without mention of hemorrhage), GERD (gastroesophageal reflux disease), History of blood transfusion, History of CHF (congestive heart failure), History of MI (myocardial infarction), History of ovarian cyst, History of peritonitis, HTN (hypertension), Hx of blood clots, Hyperlipidemia, Intestinal or peritoneal adhesions with obstruction (postoperative) (postinfection) ([address]), Kidney infection, Lateral epicondylitis  of elbow, MDRO (multiple drug resistant organisms) resistance, Muscle strain, Other abnormal glucose, PONV (postoperative nausea and vomiting), Pre-diabetes, Restless legs syndrome (RLS), Snores, 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[address]), TIA (transient ischemic attack), Under care of service provider, Under care of service provider, Uses walker, Vitamin D deficiency, Wears glasses, and Wellness examination. Past Surgical History:   has a past surgical history that includes Ovary removal (); colectomy (); Appendectomy (); Ovary removal (); Hysterectomy (); Bunionectomy (Left); sinus surgery (); Colonoscopy; other surgical history (2014); cyst removal (Right); Wrist fracture surgery (Left, 2019); Upper gastrointestinal endoscopy (N/A, 2019);  Upper gastrointestinal

## 2023-08-11 NOTE — PROGRESS NOTES
[facility]   ACUTE REHABILITATION  LUNCH GROUP NOTE     Date: 23  Patient Name: Douglas Capellan      Room: 6188/4490-19        MRN: [id number]    : 1950  (68 y.o.)  Gender: female   Referring Practitioner: Dr Erik Charles  Diagnosis: Cervical spinal stenosis s/p ACDF C6-C7 and C2-T2 fusion (23)    Patient participated in the OT Program at  in the [facility] on this date. They were in group for  33 minites      The patient  initiated conversation. OT practitioner facilitated therapeutic conversation regarding food here at the hospital, favorite foods and facorite things to cook, difficulty being away from family, TV shows and cartoons kids/grandkids like to watch. They expressed  enjoyment in the setting and people. The patient's pain was monitored as they were  able to tolerate Group activity. Pain level was 0/10. They were Setup / SBA  with the activity.       Nella Ulrich, QUINONES/L

## 2023-08-11 NOTE — PLAN OF CARE
Problem: Discharge Planning  Goal: Discharge to home or other facility with appropriate resources  8/11/2023 1008 by Zachariah Tovar RN  Outcome: Progressing     Problem: Skin/Tissue Integrity  Goal: Absence of new skin breakdown  Description: 1. Monitor for areas of redness and/or skin breakdown  2. Assess vascular access sites hourly  3. Every 4-6 hours minimum:  Change oxygen saturation probe site  4. Every 4-6 hours:  If on nasal continuous positive airway pressure, respiratory therapy assess nares and determine need for appliance change or resting period.   8/11/2023 1008 by Zachariah Tovar RN  Outcome: Progressing     Problem: Safety - Adult  Goal: Free from fall injury  8/11/2023 1008 by Zachariah Tovar RN  Outcome: Progressing     Problem: ABCDS Injury Assessment  Goal: Absence of physical injury  8/11/2023 1008 by Zachariah Tovar RN  Outcome: Progressing     Problem: Nutrition Deficit:  Goal: Optimize nutritional status  8/11/2023 1008 by Zachariah Tovar RN  Outcome: Progressing     Problem: Pain  Goal: Verbalizes/displays adequate comfort level or baseline comfort level  8/11/2023 1008 by Zachariah Tovar RN  Outcome: Progressing

## 2023-08-11 NOTE — PROGRESS NOTES
Physical Therapy  [facility]  Rehabilitation Physical Therapy Progress Note    NAME: Reyes Finch  : 1950 (68 y.o.)  MRN: [id number]  CODE STATUS: Full Code    Date of Service: 23      Past Medical History:   Diagnosis Date    Allergic rhinitis, cause unspecified     Back pain     lumbar    Bowel obstruction (HCC)     history of due to scar tissue, resolved non-surgically    C. difficile diarrhea     CAD (coronary artery disease)     no stent needed per pt.  Dr. Clem Mendoza did cath at      Cardiac murmur     Cellulitis 2017    leg left leg/bug bite    Cerebral artery occlusion with cerebral infarction [facility])     TIA 2014    COVID-19     ONE YR AGO IN 2020 fever and cough    Diverticulosis of colon (without mention of hemorrhage)     GERD (gastroesophageal reflux disease)     on rx    History of blood transfusion     approx     -no reactions    History of CHF (congestive heart failure)     History of MI (myocardial infarction)     thought due to a blood clot    History of ovarian cyst     had oopherectomy holly    History of peritonitis     due to ruptured appendix age 13    HTN (hypertension)     Hx of blood clots     right leg    Hyperlipidemia     Intestinal or peritoneal adhesions with obstruction (postoperative) (postinfection) ([address])     Kidney infection     renal failure/sepsis/spider bite    Lateral epicondylitis  of elbow     MDRO (multiple drug resistant organisms) resistance     c diff    Muscle strain     right posterior shoulder    Other abnormal glucose     PONV (postoperative nausea and vomiting)     dry heaves    Pre-diabetes     Restless legs syndrome (RLS)     Snores     no cpap    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)     TIA (transient ischemic attack)     Under care of service provider 2023    [id number]-last visit 2023    Under care of service provider 2023    [id number] hx of TIA in the past-Left LE slides out during sit<>stand, needs to be blocked during sit<>stnad. GOALS  Patient Goals   Patient Goals : I want to get stronger and move better. Short Term Goals  Time Frame for Short Term Goals: 9  days. Short Term Goal 1: Perform bed mobility with Mod A  Short Term Goal 2: Perform functional transfers with max A with RW  Short Term Goal 3: Pt able to ambulate with rolling walker distance of  20 to 40 ft, min A x 2  Short Term Goal 4: Demo Fair- dynamic standing balance with rolling walker to decrease risk of falls  Short Term Goal 5: Pt able to propel w/c distance fo 30 to 50 ft, min/mod A level surface. Long Term Goals  Time Frame for Long Term Goals : By DC  Long Term Goal 1: Pt able to perform bed mobility min A with use of bed rail. Long Term Goal 2: Pt able to perform fucntional transfers at min A from 18\" or higher surfaces. Long Term Goal 3: Pt able to ambulate with RW, distance of 20 to 50 ft, min A, level surfaces  Long Term Goal 4: Pt able to take 5 to 10 steps outside [place] (parking lot/side walk) with rolling walker, mod A  Long Term Goal 5: Pt able to propel w/ distance upto 50 ft, level surfaces, SBA  Long term goal 6: Pt to demonstrate fair to fair+ technique for HEP for LE to continue at home    [address]:  minutes of therapy at least 5 out of 7 days a week (1.5 hr/day, 5 to 7 days/week.)  Specific Instructions for Next Treatment: Continue to use natan stedy for transfers, work on sit<>stand and stepping in // bars. Current Treatment Recommendations: Strengthening;ROM;Balance training;Functional mobility training;Transfer training; Endurance training;Gait training;Home exercise program;Safety education & training; Therapeutic activities; Patient/Caregiver education & training;Neuromuscular re-education;Equipment evaluation, education, & procurement;Positioning; Wheelchair mobility training  Safety Devices  Type of

## 2023-08-11 NOTE — INTERDISCIPLINARY ROUNDS
[facility] Acute Inpatient Rehabilitation  INTERDISCIPLINARY MEETING  Date: 23  Patient Name: Ana Maria Wyatt       Room: 7562/1781-72  MRN: [id number]       : 1950  (68 y.o.)     Gender: female     Patient's care team, including nursing, speech language pathologist, occupational therapist, and/or physical therapist, met to discuss patient's care needs. Any patient concerns, change in medical status, and current transfer/mobility status were identified at this time. Any Additional Pertinent Information:   Swallow study at 2:30.  Staples removed yester Clarification via perfect serve from Pervis Findrolando PA that patient \"can have collar off for bathing and eating, otherwise we would like to remain on.\"     Participating Team Members:  Nurse: Tammi Jenkins RN     Occupational Therapist:  Lavelle Donahue OT   Physical Therapist: Anthony Curry PT  Speech Therapist:  Cody Laws M.A., [address] [facility]

## 2023-08-11 NOTE — PLAN OF CARE
Problem: Discharge Planning  Goal: Discharge to home or other facility with appropriate resources  Outcome: Progressing  Flowsheets (Taken 8/10/2023 2025)  Discharge to home or other facility with appropriate resources:   Identify barriers to discharge with patient and caregiver   Arrange for needed discharge resources and transportation as appropriate   Identify discharge learning needs (meds, wound care, etc)   Refer to discharge planning if patient needs post-hospital services based on physician order or complex needs related to functional status, cognitive ability or social support system     Problem: Skin/Tissue Integrity  Goal: Absence of new skin breakdown  Description: 1. Monitor for areas of redness and/or skin breakdown  2. Assess vascular access sites hourly  3. Every 4-6 hours minimum:  Change oxygen saturation probe site  4. Every 4-6 hours:  If on nasal continuous positive airway pressure, respiratory therapy assess nares and determine need for appliance change or resting period.   Outcome: Progressing     Problem: Safety - Adult  Goal: Free from fall injury  Outcome: Progressing     Problem: ABCDS Injury Assessment  Goal: Absence of physical injury  Outcome: Progressing     Problem: Chronic Conditions and Co-morbidities  Goal: Patient's chronic conditions and co-morbidity symptoms are monitored and maintained or improved  Outcome: Progressing  Flowsheets (Taken 8/10/2023 2025)  Care Plan - Patient's Chronic Conditions and Co-Morbidity Symptoms are Monitored and Maintained or Improved:   Monitor and assess patient's chronic conditions and comorbid symptoms for stability, deterioration, or improvement   Collaborate with multidisciplinary team to address chronic and comorbid conditions and prevent exacerbation or deterioration   Update acute care plan with appropriate goals if chronic or comorbid symptoms are exacerbated and prevent overall improvement and discharge     Problem: Nutrition

## 2023-08-11 NOTE — PROGRESS NOTES
Physical Medicine & Rehabilitation  Progress Note      Subjective:      Libby Fernandez is a 68 y.o. female with cervical stenosis s/p C6-C7 ACDF, posterior C2-T2 fusion. She reports doing fine today. She notes some pruritus around the neck incision. She states that \"electrode\" pain has improved. She denies any other acute concerns. ROS:  Denies fevers, chills, sweats. No chest pain, palpitations, lightheadedness. Denies coughing, wheezing or shortness of breath. Denies abdominal pain, nausea, diarrhea or constipation. No new areas of joint pain. Denies new areas of numbness or weakness. Denies new anxiety or depression issues. No new skin problems. Rehabilitation:     Physical Therapy    Restrictions/Precautions: Surgical Protocols, Fall Risk  Implants present? : Metal implants (multiple spinal fusions.)  Other position/activity restrictions: HOB 30 degrees or higher; Pt never to be without pillow,  s/p ACDF C6-C7 and C2-T2 fusion (7/26/23). NO STRAWS. Per CNP Rosalind Appl (8/10/23) , patient may remove aspen collar for 15 min intervals- while eating and for hygiene. Pt may remove while sleeping or in bed. Pt is to maintain collar for 8 weeks. Required Braces or Orthoses  Cervical: c-collar (ok to remove when resting in bed and for only 15 min for hygeine and eating.)    Bed mobility  Rolling to Left: Maximum assistance  Rolling to Right: Maximum assistance  Supine to Sit: Moderate assistance  Sit to Supine: Maximum assistance (trunk and right LE management)  Scooting: Maximal assistance (seated scooting towards EOB)  Bed Mobility Comments: Patient completed supine>sit with HOB elevated initially requiring CGA ,however, got stuck just as feet got over the EOB, patient required modA to bring trunk to midline with vcs for hand placment and weight shifting. Transfers  Sit to Stand:  Moderate Assistance (modA from wc<>RW; CGA toilet<>Emily Yin)  Stand to Sit: Minimal Assistance  Bed to Chair:

## 2023-08-11 NOTE — PROCEDURES
INSTRUMENTAL SWALLOW REPORT  MODIFIED BARIUM SWALLOW    NAME: Maricruz Garcia   : 1950  MRN: [id number]       Date of Eval: 2023              Referring Diagnosis(es):  dysphagia    Past Medical History:  has a past medical history of Allergic rhinitis, cause unspecified, Back pain, Bowel obstruction ([address]), C. difficile diarrhea, CAD (coronary artery disease), Cardiac murmur, Cellulitis, Cerebral artery occlusion with cerebral infarction ([address]), COVID-19, Diverticulosis of colon (without mention of hemorrhage), GERD (gastroesophageal reflux disease), History of blood transfusion, History of CHF (congestive heart failure), History of MI (myocardial infarction), History of ovarian cyst, History of peritonitis, HTN (hypertension), Hx of blood clots, Hyperlipidemia, Intestinal or peritoneal adhesions with obstruction (postoperative) (postinfection) ([address]), Kidney infection, Lateral epicondylitis  of elbow, MDRO (multiple drug resistant organisms) resistance, Muscle strain, Other abnormal glucose, PONV (postoperative nausea and vomiting), Pre-diabetes, Restless legs syndrome (RLS), Snores, 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[address]), TIA (transient ischemic attack), Under care of service provider, Under care of service provider, Uses walker, Vitamin D deficiency, Wears glasses, and Wellness examination. Past Surgical History:  has a past surgical history that includes Ovary removal (); colectomy (); Appendectomy (); Ovary removal (); Hysterectomy (); Bunionectomy (Left); sinus surgery (); Colonoscopy; other surgical history (2014); cyst removal (Right); Wrist fracture surgery (Left, 2019); Upper gastrointestinal endoscopy (N/A, 2019); Upper gastrointestinal endoscopy (N/A, 2019); Upper gastrointestinal endoscopy (N/A, 2019); Abdomen surgery (); lumbar fusion (N/A, 02/10/2020); Cardiac catheterization ();  [place] tooth extraction; lumbar fusion (N/A,

## 2023-08-12 LAB
ANION GAP SERPL CALCULATED.3IONS-SCNC: 10 MMOL/L (ref 9–17)
BASOPHILS # BLD: 0.1 K/UL (ref 0–0.2)
BASOPHILS NFR BLD: 1 % (ref 0–2)
BUN SERPL-MCNC: 22 MG/DL (ref 8–23)
CALCIUM SERPL-MCNC: 8.5 MG/DL (ref 8.6–10.4)
CHLORIDE SERPL-SCNC: 104 MMOL/L (ref 98–107)
CO2 SERPL-SCNC: 29 MMOL/L (ref 20–31)
CREAT SERPL-MCNC: 0.7 MG/DL (ref 0.5–0.9)
EOSINOPHIL # BLD: 0.6 K/UL (ref 0–0.4)
EOSINOPHILS RELATIVE PERCENT: 13 % (ref 0–4)
ERYTHROCYTE [DISTWIDTH] IN BLOOD BY AUTOMATED COUNT: 13.3 % (ref 11.5–14.9)
GFR SERPL CREATININE-BSD FRML MDRD: >60 ML/MIN/1.73M2
GLUCOSE SERPL-MCNC: 114 MG/DL (ref 70–99)
HCT VFR BLD AUTO: 26.6 % (ref 36–46)
HGB BLD-MCNC: 8.6 G/DL (ref 12–16)
LYMPHOCYTES NFR BLD: 1.5 K/UL (ref 1–4.8)
LYMPHOCYTES RELATIVE PERCENT: 30 % (ref 24–44)
MCH RBC QN AUTO: 30.3 PG (ref 26–34)
MCHC RBC AUTO-ENTMCNC: 32.2 G/DL (ref 31–37)
MCV RBC AUTO: 94.3 FL (ref 80–100)
MONOCYTES NFR BLD: 0.6 K/UL (ref 0.1–1.3)
MONOCYTES NFR BLD: 11 % (ref 1–7)
NEUTROPHILS NFR BLD: 45 % (ref 36–66)
NEUTS SEG NFR BLD: 2.4 K/UL (ref 1.3–9.1)
PLATELET # BLD AUTO: 527 K/UL (ref 150–450)
PMV BLD AUTO: 7 FL (ref 6–12)
POTASSIUM SERPL-SCNC: 4 MMOL/L (ref 3.7–5.3)
RBC # BLD AUTO: 2.82 M/UL (ref 4–5.2)
SODIUM SERPL-SCNC: 143 MMOL/L (ref 135–144)
WBC OTHER # BLD: 5.2 K/UL (ref 3.5–11)

## 2023-08-12 PROCEDURE — 1180000000 HC REHAB R&B

## 2023-08-12 PROCEDURE — 99232 SBSQ HOSP IP/OBS MODERATE 35: CPT | Performed by: INTERNAL MEDICINE

## 2023-08-12 PROCEDURE — 97110 THERAPEUTIC EXERCISES: CPT

## 2023-08-12 PROCEDURE — 97530 THERAPEUTIC ACTIVITIES: CPT

## 2023-08-12 PROCEDURE — 80048 BASIC METABOLIC PNL TOTAL CA: CPT

## 2023-08-12 PROCEDURE — 36415 COLL VENOUS BLD VENIPUNCTURE: CPT

## 2023-08-12 PROCEDURE — 97535 SELF CARE MNGMENT TRAINING: CPT

## 2023-08-12 PROCEDURE — 85025 COMPLETE CBC W/AUTO DIFF WBC: CPT

## 2023-08-12 PROCEDURE — 99232 SBSQ HOSP IP/OBS MODERATE 35: CPT | Performed by: STUDENT IN AN ORGANIZED HEALTH CARE EDUCATION/TRAINING PROGRAM

## 2023-08-12 PROCEDURE — 6370000000 HC RX 637 (ALT 250 FOR IP): Performed by: NURSE PRACTITIONER

## 2023-08-12 PROCEDURE — 6370000000 HC RX 637 (ALT 250 FOR IP): Performed by: STUDENT IN AN ORGANIZED HEALTH CARE EDUCATION/TRAINING PROGRAM

## 2023-08-12 RX ORDER — CARVEDILOL 25 MG/1
25 TABLET ORAL 2 TIMES DAILY WITH MEALS
Status: DISCONTINUED | OUTPATIENT
Start: 2023-08-13 | End: 2023-08-18 | Stop reason: HOSPADM

## 2023-08-12 RX ADMIN — BUSPIRONE HYDROCHLORIDE 5 MG: 10 TABLET ORAL at 07:35

## 2023-08-12 RX ADMIN — GABAPENTIN 200 MG: 100 CAPSULE ORAL at 20:50

## 2023-08-12 RX ADMIN — FUROSEMIDE 40 MG: 40 TABLET ORAL at 07:33

## 2023-08-12 RX ADMIN — CITALOPRAM HYDROBROMIDE 20 MG: 20 TABLET ORAL at 07:33

## 2023-08-12 RX ADMIN — CARVEDILOL 12.5 MG: 12.5 TABLET, FILM COATED ORAL at 07:33

## 2023-08-12 RX ADMIN — DOCUSATE SODIUM 100 MG: 50 LIQUID ORAL at 07:33

## 2023-08-12 RX ADMIN — BUSPIRONE HYDROCHLORIDE 5 MG: 10 TABLET ORAL at 20:50

## 2023-08-12 RX ADMIN — FUROSEMIDE 40 MG: 40 TABLET ORAL at 17:16

## 2023-08-12 RX ADMIN — APIXABAN 5 MG: 5 TABLET, FILM COATED ORAL at 07:33

## 2023-08-12 RX ADMIN — APIXABAN 5 MG: 5 TABLET, FILM COATED ORAL at 20:50

## 2023-08-12 RX ADMIN — OXYCODONE HYDROCHLORIDE 10 MG: 10 TABLET ORAL at 20:50

## 2023-08-12 RX ADMIN — LANSOPRAZOLE 15 MG: 15 TABLET, ORALLY DISINTEGRATING, DELAYED RELEASE ORAL at 06:17

## 2023-08-12 RX ADMIN — OXYCODONE HYDROCHLORIDE 10 MG: 10 TABLET ORAL at 14:36

## 2023-08-12 RX ADMIN — CARVEDILOL 12.5 MG: 12.5 TABLET, FILM COATED ORAL at 17:16

## 2023-08-12 RX ADMIN — GABAPENTIN 200 MG: 100 CAPSULE ORAL at 07:33

## 2023-08-12 RX ADMIN — BUSPIRONE HYDROCHLORIDE 5 MG: 10 TABLET ORAL at 14:34

## 2023-08-12 RX ADMIN — Medication 1 LOZENGE: at 01:59

## 2023-08-12 ASSESSMENT — PAIN SCALES - GENERAL
PAINLEVEL_OUTOF10: 7
PAINLEVEL_OUTOF10: 7
PAINLEVEL_OUTOF10: 4

## 2023-08-12 ASSESSMENT — PAIN DESCRIPTION - LOCATION
LOCATION: NECK
LOCATION: NECK

## 2023-08-12 ASSESSMENT — PAIN DESCRIPTION - DESCRIPTORS: DESCRIPTORS: ACHING

## 2023-08-12 ASSESSMENT — PAIN DESCRIPTION - ORIENTATION: ORIENTATION: ANTERIOR;POSTERIOR

## 2023-08-12 NOTE — PROGRESS NOTES
[address] [facility] Rehabilitation Occupational Therapy Daily Treatment Note    Date: 23  Patient Name: Radha Feliz       Room: 0417/5953-97  MRN: [id number]  Account: [de-identified]   : 1950  (68 y.o.) Gender: female       Referring Practitioner: Dr. Jesús Uriarte  Diagnosis: s/p ACDF C6-C7 and C2-T2 fusion (23)       Treatment Diagnosis: Impaired self-care status    Past Medical History:  has a past medical history of Allergic rhinitis, cause unspecified, Back pain, Bowel obstruction ([address]), C. difficile diarrhea, CAD (coronary artery disease), Cardiac murmur, Cellulitis, Cerebral artery occlusion with cerebral infarction ([address]), COVID-19, Diverticulosis of colon (without mention of hemorrhage), GERD (gastroesophageal reflux disease), History of blood transfusion, History of CHF (congestive heart failure), History of MI (myocardial infarction), History of ovarian cyst, History of peritonitis, HTN (hypertension), Hx of blood clots, Hyperlipidemia, Intestinal or peritoneal adhesions with obstruction (postoperative) (postinfection) ([address]), Kidney infection, Lateral epicondylitis  of elbow, MDRO (multiple drug resistant organisms) resistance, Muscle strain, Other abnormal glucose, PONV (postoperative nausea and vomiting), Pre-diabetes, Restless legs syndrome (RLS), Snores, 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[address]), TIA (transient ischemic attack), Under care of service provider, Under care of service provider, Uses walker, Vitamin D deficiency, Wears glasses, and Wellness examination. Past Surgical History:   has a past surgical history that includes Ovary removal (); colectomy (); Appendectomy (); Ovary removal (); Hysterectomy (); Bunionectomy (Left); sinus surgery (); Colonoscopy; other surgical history (2014); cyst removal (Right); Wrist fracture surgery (Left, 2019); Upper gastrointestinal endoscopy (N/A, 2019);  Upper gastrointestinal of therapeutic exercise/activity with focus on ROM/strength/coordination of BUE to promote increased independence and safety with self-care and mobility  Short Term Goal 8: Pt will demo Good safety throughout session throughout session with Min VC's    Long Term Goals  Time Frame for Long Term Goals : By Discharge  Long Term Goal 1: Pt will complete feeding/grooming at Mod I level. Long Term Goal 2: Pt will complete BADL's with Min A and Good safety using AE/modified techniques as needed. Long Term Goal 3: Pt will complete functional transfers with Min A and Good safety using least restrictive device. Long Term Goal 4: Pt will demo IND with appropriate HEP for UB ROM/strength/coordination. Long Term Goal 5: Pt will identify three alternative forms of intimacy that she can engage in with her partner.     Plan  Occupational Therapy Plan  Times Per Week: 5-7  Times Per Day: Twice a day  Current Treatment Recommendations: Strengthening, ROM, Balance training, Functional mobility training, Pain management, Safety education & training, Self-Care / ADL, Equipment evaluation, education, & procurement, Patient/Caregiver education & training, Coordination training, Cognitive/Perceptual training, Neuromuscular re-education, Endurance training       08/12/23 0747 08/12/23 1648   OT Individual Minutes   Time In [id number]   [facility] [id number]   CTVIOLB [id number] [phone]       Electronically signed by PATRICIA Clemons on 8/12/23 at 4:48 PM EDT

## 2023-08-12 NOTE — PLAN OF CARE
Problem: Discharge Planning  Goal: Discharge to home or other facility with appropriate resources  Outcome: Progressing  Flowsheets (Taken 8/11/2023 2130)  Discharge to home or other facility with appropriate resources:   Identify barriers to discharge with patient and caregiver   Arrange for needed discharge resources and transportation as appropriate   Identify discharge learning needs (meds, wound care, etc)   Refer to discharge planning if patient needs post-hospital services based on physician order or complex needs related to functional status, cognitive ability or social support system     Problem: Skin/Tissue Integrity  Goal: Absence of new skin breakdown  Description: 1. Monitor for areas of redness and/or skin breakdown  2. Assess vascular access sites hourly  3. Every 4-6 hours minimum:  Change oxygen saturation probe site  4. Every 4-6 hours:  If on nasal continuous positive airway pressure, respiratory therapy assess nares and determine need for appliance change or resting period.   Outcome: Progressing     Problem: Safety - Adult  Goal: Free from fall injury  Outcome: Progressing     Problem: ABCDS Injury Assessment  Goal: Absence of physical injury  Outcome: Progressing     Problem: Chronic Conditions and Co-morbidities  Goal: Patient's chronic conditions and co-morbidity symptoms are monitored and maintained or improved  Outcome: Progressing  Flowsheets (Taken 8/11/2023 2130)  Care Plan - Patient's Chronic Conditions and Co-Morbidity Symptoms are Monitored and Maintained or Improved:   Monitor and assess patient's chronic conditions and comorbid symptoms for stability, deterioration, or improvement   Collaborate with multidisciplinary team to address chronic and comorbid conditions and prevent exacerbation or deterioration   Update acute care plan with appropriate goals if chronic or comorbid symptoms are exacerbated and prevent overall improvement and discharge     Problem: Nutrition

## 2023-08-12 NOTE — PROGRESS NOTES
Physical Medicine & Rehabilitation  Progress Note      Subjective:      Lesly Montes De Oca is a 68 y.o. female with cervical stenosis s/p C6-C7 ACDF, posterior C2-T2 fusion. She reports doing well today. She denies any pain at the time of evaluation. She denies any other acute concerns. ROS:  Denies fevers, chills, sweats. No chest pain, palpitations, lightheadedness. Denies coughing, wheezing or shortness of breath. Denies abdominal pain, nausea, diarrhea or constipation. No new areas of joint pain. Denies new areas of numbness or weakness. Denies new anxiety or depression issues. No new skin problems. Rehabilitation:     Physical Therapy    Restrictions/Precautions: Surgical Protocols, Fall Risk  Implants present? : Metal implants  Other position/activity restrictions: HOB 30 degrees or higher; Pt never to be without pillow,  s/p ACDF C6-C7 and C2-T2 fusion (7/26/23). NO STRAWS. Per CHRISTOS Parr (8/10/23) , patient may remove aspen collar for 15 min intervals- while eating and for hygiene. Pt may remove while sleeping or in bed. Pt is to maintain collar for 8 weeks. Required Braces or Orthoses  Cervical: c-collar    Bed mobility  Rolling to Left: Maximum assistance  Rolling to Right: Maximum assistance  Supine to Sit: Moderate assistance (trunk progression)  Sit to Supine: Maximum assistance (trunk and right LE management)  Scooting: Maximal assistance (scooting to EOB)  Bed Mobility Comments: Patient completed supine>sit with HOB elevated initially requiring CGA ,however, got stuck just as feet got over the EOB, patient required modA to bring trunk to midline with vcs for hand placment and weight shifting. Transfers  Sit to Stand: Moderate Assistance (fluctuating assist levels from ModAx1 to CGAx1. Lessened assist level when using natan stedy or // bars.  Increased assist with RW generally)  Stand to Sit: Minimal Assistance (good eccentric control)  Bed to Chair: Dependent/Total (natan steady)  Stand

## 2023-08-12 NOTE — PROGRESS NOTES
falls  Short Term Goal 5: Pt able to propel w/c distance fo 30 to 50 ft, min/mod A level surface. Long Term Goals  Time Frame for Long Term Goals : By DC  Long Term Goal 1: Pt able to perform bed mobility min A with use of bed rail. Long Term Goal 2: Pt able to perform fucntional transfers at min A from 18\" or higher surfaces. Long Term Goal 3: Pt able to ambulate with RW, distance of 20 to 50 ft, min A, level surfaces  Long Term Goal 4: Pt able to take 5 to 10 steps outside terraine (parking lot/side walk) with rolling walker, mod A  Long Term Goal 5: Pt able to propel w/ distance upto 50 ft, level surfaces, SBA  Long term goal 6: Pt to demonstrate fair to fair+ technique for HEP for LE to continue at home    PLAN OF CARE  Frequency: 1-2 treatment sessions per day, 5-7 days per week  Physcial Therapy Plan  General Plan:  minutes of therapy at least 5 out of 7 days a week (1.5 hr/day, 5 to 7 days/week)  Specific Instructions for Next Treatment: Continue to use natan stedy for transfers, work on sit<>stand and stepping in // bars. Current Treatment Recommendations: Strengthening;ROM;Balance training;Functional mobility training;Transfer training; Endurance training;Gait training;Home exercise program;Safety education & training; Therapeutic activities; Patient/Caregiver education & training;Neuromuscular re-education;Equipment evaluation, education, & procurement;Positioning; Wheelchair mobility training  Safety Devices  Type of Devices: Patient at risk for falls;Gait belt;Left in bed;Call light within reach; Bed alarm in place;Nurse notified; All fall risk precautions in place  Restraints  Restraints Initially in Place: No    EDUCATION  Education  Education Given To: Patient  Education Provided: Plan of Care;Precautions; Safety; Mobility Training;Transfer Training; Fall Prevention Strategies  Education Provided Comments: transfers training and pre gait  Education Method: Demonstration;Verbal  Education Outcome:

## 2023-08-13 ENCOUNTER — APPOINTMENT (OUTPATIENT)
Dept: GENERAL RADIOLOGY | Age: 73
End: 2023-08-13
Attending: PHYSICAL MEDICINE & REHABILITATION
Payer: MEDICARE

## 2023-08-13 PROCEDURE — 72050 X-RAY EXAM NECK SPINE 4/5VWS: CPT

## 2023-08-13 PROCEDURE — 97530 THERAPEUTIC ACTIVITIES: CPT

## 2023-08-13 PROCEDURE — 6370000000 HC RX 637 (ALT 250 FOR IP): Performed by: STUDENT IN AN ORGANIZED HEALTH CARE EDUCATION/TRAINING PROGRAM

## 2023-08-13 PROCEDURE — 97110 THERAPEUTIC EXERCISES: CPT

## 2023-08-13 PROCEDURE — 97535 SELF CARE MNGMENT TRAINING: CPT

## 2023-08-13 PROCEDURE — 99232 SBSQ HOSP IP/OBS MODERATE 35: CPT | Performed by: STUDENT IN AN ORGANIZED HEALTH CARE EDUCATION/TRAINING PROGRAM

## 2023-08-13 PROCEDURE — 1180000000 HC REHAB R&B

## 2023-08-13 PROCEDURE — 99232 SBSQ HOSP IP/OBS MODERATE 35: CPT | Performed by: INTERNAL MEDICINE

## 2023-08-13 PROCEDURE — 6370000000 HC RX 637 (ALT 250 FOR IP): Performed by: INTERNAL MEDICINE

## 2023-08-13 PROCEDURE — 6370000000 HC RX 637 (ALT 250 FOR IP): Performed by: NURSE PRACTITIONER

## 2023-08-13 RX ORDER — OXYCODONE HYDROCHLORIDE 5 MG/1
5 TABLET ORAL ONCE
Status: COMPLETED | OUTPATIENT
Start: 2023-08-13 | End: 2023-08-13

## 2023-08-13 RX ORDER — LIDOCAINE 4 G/G
1 PATCH TOPICAL NIGHTLY
Status: DISCONTINUED | OUTPATIENT
Start: 2023-08-13 | End: 2023-08-18 | Stop reason: HOSPADM

## 2023-08-13 RX ADMIN — HYDROXYZINE HYDROCHLORIDE 25 MG: 25 TABLET, FILM COATED ORAL at 01:33

## 2023-08-13 RX ADMIN — DOCUSATE SODIUM 100 MG: 50 LIQUID ORAL at 07:56

## 2023-08-13 RX ADMIN — CARVEDILOL 25 MG: 25 TABLET, FILM COATED ORAL at 17:15

## 2023-08-13 RX ADMIN — OXYCODONE HYDROCHLORIDE 5 MG: 5 TABLET ORAL at 11:45

## 2023-08-13 RX ADMIN — BUSPIRONE HYDROCHLORIDE 5 MG: 10 TABLET ORAL at 14:14

## 2023-08-13 RX ADMIN — APIXABAN 5 MG: 5 TABLET, FILM COATED ORAL at 07:56

## 2023-08-13 RX ADMIN — OXYCODONE HYDROCHLORIDE 10 MG: 10 TABLET ORAL at 01:33

## 2023-08-13 RX ADMIN — CITALOPRAM HYDROBROMIDE 20 MG: 20 TABLET ORAL at 07:56

## 2023-08-13 RX ADMIN — OXYCODONE HYDROCHLORIDE 10 MG: 10 TABLET ORAL at 06:01

## 2023-08-13 RX ADMIN — APIXABAN 5 MG: 5 TABLET, FILM COATED ORAL at 20:14

## 2023-08-13 RX ADMIN — FUROSEMIDE 40 MG: 40 TABLET ORAL at 17:15

## 2023-08-13 RX ADMIN — FUROSEMIDE 40 MG: 40 TABLET ORAL at 07:56

## 2023-08-13 RX ADMIN — GABAPENTIN 200 MG: 100 CAPSULE ORAL at 07:56

## 2023-08-13 RX ADMIN — BUSPIRONE HYDROCHLORIDE 5 MG: 10 TABLET ORAL at 20:14

## 2023-08-13 RX ADMIN — CARVEDILOL 25 MG: 25 TABLET, FILM COATED ORAL at 07:56

## 2023-08-13 RX ADMIN — OXYCODONE HYDROCHLORIDE 5 MG: 5 TABLET ORAL at 14:14

## 2023-08-13 RX ADMIN — BUSPIRONE HYDROCHLORIDE 5 MG: 10 TABLET ORAL at 07:57

## 2023-08-13 RX ADMIN — HYDROXYZINE HYDROCHLORIDE 25 MG: 25 TABLET, FILM COATED ORAL at 20:14

## 2023-08-13 RX ADMIN — POLYETHYLENE GLYCOL 3350 17 G: 17 POWDER, FOR SOLUTION ORAL at 07:57

## 2023-08-13 RX ADMIN — GABAPENTIN 200 MG: 100 CAPSULE ORAL at 20:14

## 2023-08-13 RX ADMIN — OXYCODONE HYDROCHLORIDE 10 MG: 10 TABLET ORAL at 20:13

## 2023-08-13 RX ADMIN — LANSOPRAZOLE 15 MG: 15 TABLET, ORALLY DISINTEGRATING, DELAYED RELEASE ORAL at 06:02

## 2023-08-13 ASSESSMENT — PAIN DESCRIPTION - LOCATION
LOCATION: NECK

## 2023-08-13 ASSESSMENT — PAIN DESCRIPTION - ORIENTATION
ORIENTATION: ANTERIOR;POSTERIOR

## 2023-08-13 ASSESSMENT — PAIN SCALES - GENERAL
PAINLEVEL_OUTOF10: 6
PAINLEVEL_OUTOF10: 5
PAINLEVEL_OUTOF10: 7
PAINLEVEL_OUTOF10: 8
PAINLEVEL_OUTOF10: 7

## 2023-08-13 ASSESSMENT — PAIN DESCRIPTION - DESCRIPTORS
DESCRIPTORS: ACHING

## 2023-08-13 NOTE — PROGRESS NOTES
Physical Medicine & Rehabilitation  Progress Note      Subjective:      Ana Maria Wyatt is a 68 y.o. female with cervical stenosis s/p C6-C7 ACDF, posterior C2-T2 fusion. She reports doing okay today. She notes pain in the left neck near the base of the skull. She denies any radiation of pain and numbness/tingling. She also denies any new weakness. Discussed trialing a lidocaine patch at night. Also gave a one-time extra dose of oxycodone. She denies any other acute concerns. ROS:  Denies fevers, chills, sweats. No chest pain, palpitations, lightheadedness. Denies coughing, wheezing or shortness of breath. Denies abdominal pain, nausea, diarrhea or constipation. No new areas of joint pain. Denies new areas of numbness or weakness. Denies new anxiety or depression issues. No new skin problems. Rehabilitation:     Physical Therapy    Restrictions/Precautions: Surgical Protocols, Fall Risk  Implants present? : Metal implants  Other position/activity restrictions: HOB 30 degrees or higher; Pt never to be without pillow,  s/p ACDF C6-C7 and C2-T2 fusion (7/26/23). NO STRAWS. Per CNP Earlean Europe (8/10/23) , patient may remove aspen collar for 15 min intervals- while eating and for hygiene. Pt may remove while sleeping or in bed. Pt is to maintain collar for 8 weeks. Required Braces or Orthoses  Cervical: c-collar    Bed mobility  Rolling to Left: Maximum assistance  Rolling to Right: Maximum assistance  Supine to Sit: Moderate assistance (trunk progression)  Sit to Supine: Maximum assistance (trunk and right LE management)  Scooting: Maximal assistance (scooting to EOB)  Bed Mobility Comments: Patient completed supine>sit with HOB elevated initially requiring CGA ,however, got stuck just as feet got over the EOB, patient required modA to bring trunk to midline with vcs for hand placment and weight shifting. Transfers  Sit to Stand: Moderate Assistance (Pt tends to push posteriorly when going to stand.

## 2023-08-13 NOTE — PLAN OF CARE
Problem: Discharge Planning  Goal: Discharge to home or other facility with appropriate resources  8/13/2023 1857 by Adam Mckenna RN  Outcome: Progressing  8/13/2023 0639 by Elsa Sargent RN  Outcome: Progressing  Flowsheets (Taken 8/12/2023 2000)  Discharge to home or other facility with appropriate resources: Identify barriers to discharge with patient and caregiver     Problem: Skin/Tissue Integrity  Goal: Absence of new skin breakdown  Description: 1. Monitor for areas of redness and/or skin breakdown  2. Assess vascular access sites hourly  3. Every 4-6 hours minimum:  Change oxygen saturation probe site  4. Every 4-6 hours:  If on nasal continuous positive airway pressure, respiratory therapy assess nares and determine need for appliance change or resting period.   8/13/2023 1857 by Adam Mckenna RN  Outcome: Progressing  8/13/2023 0639 by Elsa Sargent RN  Outcome: Progressing     Problem: Safety - Adult  Goal: Free from fall injury  8/13/2023 1857 by Adam Mckenna RN  Outcome: Progressing  8/13/2023 0639 by Elsa Sargent RN  Outcome: Progressing     Problem: ABCDS Injury Assessment  Goal: Absence of physical injury  8/13/2023 1857 by Adam Mckenna RN  Outcome: Progressing  8/13/2023 0639 by Elsa Sargent RN  Outcome: Progressing     Problem: Chronic Conditions and Co-morbidities  Goal: Patient's chronic conditions and co-morbidity symptoms are monitored and maintained or improved  8/13/2023 0639 by Elsa Sargent RN  Outcome: Progressing  Flowsheets (Taken 8/12/2023 2000)  Care Plan - Patient's Chronic Conditions and Co-Morbidity Symptoms are Monitored and Maintained or Improved: Monitor and assess patient's chronic conditions and comorbid symptoms for stability, deterioration, or improvement     Problem: Nutrition Deficit:  Goal: Optimize nutritional status  8/13/2023 0639 by Elsa Sargent RN  Outcome: Progressing     Problem: Pain  Goal: Verbalizes/displays adequate comfort level or baseline

## 2023-08-13 NOTE — PROGRESS NOTES
[facility]   Acute Rehabilitation Occupational Therapy Daily Treatment Note    Date: 23  Patient Name: John Ashraf       Room: 6843/6440-29  MRN: [id number]  Account: [de-identified]   : 1950  (68 y.o.) Gender: female       Referring Practitioner: Dr. Lanie Mathews  Diagnosis: s/p ACDF C6-C7 and C2-T2 fusion (23)       Treatment Diagnosis: Impaired self-care status    Past Medical History:  has a past medical history of Allergic rhinitis, cause unspecified, Back pain, Bowel obstruction (HCC), C. difficile diarrhea, CAD (coronary artery disease), Cardiac murmur, Cellulitis, Cerebral artery occlusion with cerebral infarction ([address]), COVID-19, Diverticulosis of colon (without mention of hemorrhage), GERD (gastroesophageal reflux disease), History of blood transfusion, History of CHF (congestive heart failure), History of MI (myocardial infarction), History of ovarian cyst, History of peritonitis, HTN (hypertension), Hx of blood clots, Hyperlipidemia, Intestinal or peritoneal adhesions with obstruction (postoperative) (postinfection) ([address]), Kidney infection, Lateral epicondylitis  of elbow, MDRO (multiple drug resistant organisms) resistance, Muscle strain, Other abnormal glucose, PONV (postoperative nausea and vomiting), Pre-diabetes, Restless legs syndrome (RLS), Snores, 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[address]), TIA (transient ischemic attack), Under care of service provider, Under care of service provider, Uses walker, Vitamin D deficiency, Wears glasses, and Wellness examination. Past Surgical History:   has a past surgical history that includes Ovary removal (); colectomy (); Appendectomy (); Ovary removal (); Hysterectomy (); Bunionectomy (Left); sinus surgery (); Colonoscopy; other surgical history (2014); cyst removal (Right); Wrist fracture surgery (Left, 2019); Upper gastrointestinal endoscopy (N/A, 2019);  Upper gastrointestinal

## 2023-08-13 NOTE — PROGRESS NOTES
across midline- standing at RW, 5x each UE  Motor Control/Coordination: 4 inch step taps while sitting in w/c x 10  Exercise Equipment: Nustep L2, 6 minutes    ASSESSMENT       Activity Tolerance  Activity Tolerance: Patient limited by pain  Activity Tolerance Comments: cervical pain limiting tolerance to activity this date    Assessment  Treatment Diagnosis: Impaired function. Therapy Prognosis: Fair  Decision Making: Medium Complexity  History: Hx of multiple back surgeries, c-spine surgery, Hx of TIA. Barriers to Learning: Cognitive deficts  Discharge Recommendations: Patient would benefit from continued therapy after discharge; Therapy recommended at discharge;Home with Home health PT;Home with assist PRN  PT Equipment Recommendations  Equipment Needed: No        GOALS  Patient Goals   Patient Goals : I want to get stronger and move better. Short Term Goals  Time Frame for Short Term Goals: 9  days. Short Term Goal 1: Perform bed mobility with Mod A  Short Term Goal 2: Perform functional transfers with max A with RW  Short Term Goal 3: Pt able to ambulate with rolling walker distance of  20 to 40 ft, min A x 2  Short Term Goal 4: Demo Fair- dynamic standing balance with rolling walker to decrease risk of falls  Short Term Goal 5: Pt able to propel w/c distance fo 30 to 50 ft, min/mod A level surface. Long Term Goals  Time Frame for Long Term Goals : By DC  Long Term Goal 1: Pt able to perform bed mobility min A with use of bed rail. Long Term Goal 2: Pt able to perform fucntional transfers at min A from 18\" or higher surfaces.   Long Term Goal 3: Pt able to ambulate with RW, distance of 20 to 50 ft, min A, level surfaces  Long Term Goal 4: Pt able to take 5 to 10 steps outside [place] (parking lot/side walk) with rolling walker, mod A  Long Term Goal 5: Pt able to propel w/ distance upto 50 ft, level surfaces, SBA  Long term goal 6: Pt to demonstrate fair to fair+ technique for HEP for LE to continue at home    PLAN OF CARE  Frequency: 1-2 treatment sessions per day, 5-7 days per week  Physcial Therapy Plan  General Plan:  minutes of therapy at least 5 out of 7 days a week (1.5 hr/day, 5 to 7 days/week)  Specific Instructions for Next Treatment: Continue to use natan stedy for transfers, work on sit<>stand and stepping in // bars. Current Treatment Recommendations: Strengthening;ROM;Balance training;Functional mobility training;Transfer training; Endurance training;Gait training;Home exercise program;Safety education & training; Therapeutic activities; Patient/Caregiver education & training;Neuromuscular re-education;Equipment evaluation, education, & procurement;Positioning; Wheelchair mobility training  Safety Devices  Type of Devices: Patient at risk for falls;Gait belt;Left in bed;Call light within reach; Bed alarm in place;Nurse notified; All fall risk precautions in place  Restraints  Restraints Initially in Place: No    EDUCATION  Education  Education Given To: Patient  Education Provided: Plan of Care;Precautions; Safety; Mobility Training;Transfer Training; Fall Prevention Strategies  Education Provided Comments: transfers training and pre gait  Education Method: Demonstration;Verbal  Education Outcome: Verbalized understanding;Continued education needed          Therapy Time     08/13/23 0800 08/13/23 1300   PT Individual Minutes   Time In 0800 1300   Time Out [id number]   Minutes 61 1900 Amaury Wood PTA, 08/13/23 at 2:33 PM

## 2023-08-13 NOTE — PLAN OF CARE
Problem: Discharge Planning  Goal: Discharge to home or other facility with appropriate resources  8/13/2023 0639 by Zuly Osborn RN  Outcome: Progressing  Flowsheets (Taken 8/12/2023 2000)  Discharge to home or other facility with appropriate resources: Identify barriers to discharge with patient and caregiver  8/12/2023 1831 by Clint Sweeney RN  Outcome: Progressing     Problem: Skin/Tissue Integrity  Goal: Absence of new skin breakdown  Description: 1. Monitor for areas of redness and/or skin breakdown  2. Assess vascular access sites hourly  3. Every 4-6 hours minimum:  Change oxygen saturation probe site  4. Every 4-6 hours:  If on nasal continuous positive airway pressure, respiratory therapy assess nares and determine need for appliance change or resting period.   8/13/2023 5005 by Zuly Osborn RN  Outcome: Progressing  8/12/2023 1831 by Clint Sweeney RN  Outcome: Progressing     Problem: Safety - Adult  Goal: Free from fall injury  8/13/2023 0639 by Zuly Osborn RN  Outcome: Progressing  8/12/2023 1831 by Clint Sweeney RN  Outcome: Progressing     Problem: ABCDS Injury Assessment  Goal: Absence of physical injury  8/13/2023 0639 by Zuly Osborn RN  Outcome: Progressing  8/12/2023 1831 by Clint Sweeney RN  Outcome: Progressing     Problem: Chronic Conditions and Co-morbidities  Goal: Patient's chronic conditions and co-morbidity symptoms are monitored and maintained or improved  8/13/2023 0639 by Zuly Osborn RN  Outcome: Progressing  Flowsheets (Taken 8/12/2023 2000)  Care Plan - Patient's Chronic Conditions and Co-Morbidity Symptoms are Monitored and Maintained or Improved: Monitor and assess patient's chronic conditions and comorbid symptoms for stability, deterioration, or improvement  8/12/2023 1831 by Clint Sweeney RN  Outcome: Progressing     Problem: Nutrition Deficit:  Goal: Optimize nutritional status  8/13/2023 0639 by Zuly Osborn RN  Outcome: Progressing  8/12/2023 1831 by Adam Mckenna RN  Outcome: Progressing     Problem: Pain  Goal: Verbalizes/displays adequate comfort level or baseline comfort level  8/13/2023 0639 by Elsa Sargent RN  Outcome: Progressing  8/12/2023 1831 by Adam Mckenna RN  Outcome: Progressing

## 2023-08-14 PROCEDURE — 6370000000 HC RX 637 (ALT 250 FOR IP): Performed by: NURSE PRACTITIONER

## 2023-08-14 PROCEDURE — 97110 THERAPEUTIC EXERCISES: CPT

## 2023-08-14 PROCEDURE — 97530 THERAPEUTIC ACTIVITIES: CPT

## 2023-08-14 PROCEDURE — 6370000000 HC RX 637 (ALT 250 FOR IP): Performed by: INTERNAL MEDICINE

## 2023-08-14 PROCEDURE — 99232 SBSQ HOSP IP/OBS MODERATE 35: CPT | Performed by: INTERNAL MEDICINE

## 2023-08-14 PROCEDURE — 97116 GAIT TRAINING THERAPY: CPT

## 2023-08-14 PROCEDURE — 6370000000 HC RX 637 (ALT 250 FOR IP): Performed by: STUDENT IN AN ORGANIZED HEALTH CARE EDUCATION/TRAINING PROGRAM

## 2023-08-14 PROCEDURE — 97535 SELF CARE MNGMENT TRAINING: CPT

## 2023-08-14 PROCEDURE — 99232 SBSQ HOSP IP/OBS MODERATE 35: CPT | Performed by: PHYSICAL MEDICINE & REHABILITATION

## 2023-08-14 PROCEDURE — 1180000000 HC REHAB R&B

## 2023-08-14 RX ADMIN — APIXABAN 5 MG: 5 TABLET, FILM COATED ORAL at 07:40

## 2023-08-14 RX ADMIN — FUROSEMIDE 40 MG: 40 TABLET ORAL at 07:40

## 2023-08-14 RX ADMIN — CITALOPRAM HYDROBROMIDE 20 MG: 20 TABLET ORAL at 07:40

## 2023-08-14 RX ADMIN — CARVEDILOL 25 MG: 25 TABLET, FILM COATED ORAL at 17:10

## 2023-08-14 RX ADMIN — GABAPENTIN 200 MG: 100 CAPSULE ORAL at 07:40

## 2023-08-14 RX ADMIN — LANSOPRAZOLE 15 MG: 15 TABLET, ORALLY DISINTEGRATING, DELAYED RELEASE ORAL at 05:18

## 2023-08-14 RX ADMIN — POLYETHYLENE GLYCOL 3350 17 G: 17 POWDER, FOR SOLUTION ORAL at 07:40

## 2023-08-14 RX ADMIN — OXYCODONE HYDROCHLORIDE 10 MG: 10 TABLET ORAL at 05:18

## 2023-08-14 RX ADMIN — BUSPIRONE HYDROCHLORIDE 5 MG: 10 TABLET ORAL at 09:40

## 2023-08-14 RX ADMIN — APIXABAN 5 MG: 5 TABLET, FILM COATED ORAL at 20:17

## 2023-08-14 RX ADMIN — FUROSEMIDE 40 MG: 40 TABLET ORAL at 17:10

## 2023-08-14 RX ADMIN — OXYCODONE HYDROCHLORIDE 10 MG: 10 TABLET ORAL at 00:27

## 2023-08-14 RX ADMIN — CARVEDILOL 25 MG: 25 TABLET, FILM COATED ORAL at 07:40

## 2023-08-14 RX ADMIN — GABAPENTIN 200 MG: 100 CAPSULE ORAL at 20:15

## 2023-08-14 RX ADMIN — DOCUSATE SODIUM 100 MG: 50 LIQUID ORAL at 07:40

## 2023-08-14 RX ADMIN — HYDROXYZINE HYDROCHLORIDE 25 MG: 25 TABLET, FILM COATED ORAL at 20:17

## 2023-08-14 RX ADMIN — OXYCODONE HYDROCHLORIDE 5 MG: 5 TABLET ORAL at 20:16

## 2023-08-14 RX ADMIN — BUSPIRONE HYDROCHLORIDE 5 MG: 10 TABLET ORAL at 13:29

## 2023-08-14 RX ADMIN — BUSPIRONE HYDROCHLORIDE 5 MG: 10 TABLET ORAL at 20:17

## 2023-08-14 ASSESSMENT — PAIN SCALES - GENERAL
PAINLEVEL_OUTOF10: 7
PAINLEVEL_OUTOF10: 5
PAINLEVEL_OUTOF10: 0
PAINLEVEL_OUTOF10: 8

## 2023-08-14 ASSESSMENT — PAIN DESCRIPTION - DESCRIPTORS: DESCRIPTORS: STABBING

## 2023-08-14 ASSESSMENT — PAIN DESCRIPTION - LOCATION
LOCATION: NECK

## 2023-08-14 ASSESSMENT — PAIN DESCRIPTION - ORIENTATION: ORIENTATION: POSTERIOR

## 2023-08-14 ASSESSMENT — PAIN SCALES - WONG BAKER: WONGBAKER_NUMERICALRESPONSE: 0

## 2023-08-14 NOTE — PATIENT CARE CONFERENCE
[facility] Acute Inpatient Rehabilitation  TEAM CONFERENCE NOTE  Date: 8/15/23  Patient Name: Rona Lundberg       Room: 5308/3444-74  MRN: [id number]       : 1950  (68 y.o.)     Gender: female     Referring Practitioner: Dr Kaelyn Rebolledo DIAGNOSIS: Spinal stenosis in cervical region    NURSING    Bladder Continence  Always Continent  Bowel Continence Always Continent    Date of Last BM: 2023    Bladder/Bowel Program Interventions: Both Bowel & Bladder Program In Place     Wounds/Incisions/Ulcers: Incision healing well    Pain Control: Patient's pain currently controlled and pain regimen effective as ordered    Pain Medication Regimen Usage Pattern: MAR reviewed and pain medications are being used at the following frequency (Specify Medication, # Doses Administered on average per day, identified patterns of use - for example: time of day, prior to activity/therapy)  Tylenol 650 mg Q4H Last Dose: 8/15/2023 0930   Tamera 5/10 mg Q4H Last Dose 8/15/2023 1000    Fall Risk:  Falling star program initiated    Medication Education Program: Patient currently unable to manage medications and responsible party being educated    Discharge Preparation Patient/Responsible Party Education In Progress:   Wound Care and C-Collar management. Nursing specific communication for TEAM:  Changing Padding Of Cervical Collar Daily     PHYSICAL THERAPY    Bed Mobility:    Supine to Sit: Minimal assistance (upper trunk)  2 Assist for sit to supine  Scooting: Moderate assistance (to EOB)  Bed Mobility Comments: head of bed elevated 30 degrees  . Daughter reports pt sleeps in recliner        Transfers:  Sit to Stand: Moderate Assistance (WC to RW . Pt tends to push posteriorly left LE knee extension. Cues for foot placement, scooting forward to edge of chair and use of forward lean, poor carryover. Marilyn to stand with natan gordillo.  WC to parallel bars UE assist at pparallel bars CGA)  Stand to Sit: Minimal Assistance Worker/:   Darryle Modest, RN  Occupational Therapist: Syed Zayas OT   Physical Therapist: [place] [facility]  Speech Therapist:  N/A  Nurse: Jhonatan Carvajal RN   Dietary/Nutrition: Paulette Parsons RD, LD  Pastoral Care: Chaplain Rebecca Marks  CMG: Calos Mclaughlin RN    I attest to leading the interdisciplinary team meeting, required attendees are present as listed above, I approve the established interdisciplinary plan of care as documented within the medical record of Ana Maria Wyatt.     Freddie Rothman MD

## 2023-08-14 NOTE — PLAN OF CARE
Problem: Discharge Planning  Goal: Discharge to home or other facility with appropriate resources  8/14/2023 1206 by Arlet Villeda LPN  Outcome: Progressing     Problem: Skin/Tissue Integrity  Goal: Absence of new skin breakdown  Description: 1. Monitor for areas of redness and/or skin breakdown  2. Assess vascular access sites hourly  3. Every 4-6 hours minimum:  Change oxygen saturation probe site  4. Every 4-6 hours:  If on nasal continuous positive airway pressure, respiratory therapy assess nares and determine need for appliance change or resting period.   8/14/2023 1206 by Arlet Villeda LPN  Outcome: Progressing     Problem: Safety - Adult  Goal: Free from fall injury  8/14/2023 1206 by Arlet Villeda LPN  Outcome: Progressing     Problem: ABCDS Injury Assessment  Goal: Absence of physical injury  8/14/2023 1206 by Arlet Villeda LPN  Outcome: Progressing     Problem: Chronic Conditions and Co-morbidities  Goal: Patient's chronic conditions and co-morbidity symptoms are monitored and maintained or improved  8/14/2023 1206 by Arlet Villeda LPN  Outcome: Progressing     Problem: Nutrition Deficit:  Goal: Optimize nutritional status  8/14/2023 1206 by Arlet Villeda LPN  Outcome: Progressing     Problem: Pain  Goal: Verbalizes/displays adequate comfort level or baseline comfort level  8/14/2023 1206 by Arlet Villeda LPN  Outcome: Progressing

## 2023-08-14 NOTE — PROGRESS NOTES
Physical Medicine & Rehabilitation  Progress Note      Subjective:      68year-old female with cervical stenosis treated with C6-7 ACDF and posterior C2-T2 fusion. Patient is having problems with pain in the cervical spine, requiring pain medications. It is responding to prn medications. She continues to have fairly significant functional weakness. ROS:  Denies fevers, chills, sweats. No chest pain, palpitations, lightheadedness. Denies coughing, wheezing or shortness of breath. Denies abdominal pain, nausea, diarrhea or constipation. No new areas of joint pain. Denies new areas of numbness or weakness. Denies new anxiety or depression issues. No new skin problems. Rehabilitation:   Progressing in therapies. PT:    Bed mobility  Rolling to Left: Maximum assistance  Rolling to Right: Maximum assistance  Supine to Sit: Moderate assistance (trunk progression)  Sit to Supine: Maximum assistance (trunk and right LE management)  Scooting: Maximal assistance (scooting to EOB)  Bed Mobility Comments: Patient completed supine>sit with HOB elevated initially requiring CGA ,however, got stuck just as feet got over the EOB, patient required modA to bring trunk to midline with vcs for hand placment and weight shifting. Transfers  Sit to Stand: Moderate Assistance (Pt tends to push posteriorly when going to stand. Cues for foot placement, scooting forward to edge of chair and use of forward lean, poor carryover. Marilyn to stand with natan stedy)  Stand to Sit: Minimal Assistance (good eccentric control)  Bed to Chair: Dependent/Total (natan steady)  Stand Pivot Transfers: Dependent/Total (natan stedy w/c <> nustep)  Comment: Increased time to complete all transfers.          Ambulation  WB Status:  (-)  Ambulation  Surface: Level tile  Device: Parallel Bars  Other Apparatus: Wheelchair follow  Assistance: Minimal assistance (2nd assist for w/c follow)  Quality of Gait: posterior lean while standing, difficulty

## 2023-08-14 NOTE — PLAN OF CARE
Problem: Discharge Planning  Goal: Discharge to home or other facility with appropriate resources  8/14/2023 0508 by Sigifredo Kelley RN  Outcome: Progressing  Flowsheets (Taken 8/13/2023 2000)  Discharge to home or other facility with appropriate resources: Identify barriers to discharge with patient and caregiver  8/13/2023 1857 by Bette Justin RN  Outcome: Progressing     Problem: Skin/Tissue Integrity  Goal: Absence of new skin breakdown  Description: 1. Monitor for areas of redness and/or skin breakdown  2. Assess vascular access sites hourly  3. Every 4-6 hours minimum:  Change oxygen saturation probe site  4. Every 4-6 hours:  If on nasal continuous positive airway pressure, respiratory therapy assess nares and determine need for appliance change or resting period.   8/14/2023 0508 by Sigifredo Kelley RN  Outcome: Progressing  8/13/2023 1857 by Bette Justin RN  Outcome: Progressing     Problem: Safety - Adult  Goal: Free from fall injury  8/14/2023 0508 by Sigifredo Kelley RN  Outcome: Progressing  8/13/2023 1857 by Bette Justin RN  Outcome: Progressing     Problem: ABCDS Injury Assessment  Goal: Absence of physical injury  8/14/2023 0508 by Sigifredo Kelley RN  Outcome: Progressing  8/13/2023 1857 by Bette Justin RN  Outcome: Progressing     Problem: Chronic Conditions and Co-morbidities  Goal: Patient's chronic conditions and co-morbidity symptoms are monitored and maintained or improved  Outcome: Progressing  Flowsheets (Taken 8/13/2023 2000)  Care Plan - Patient's Chronic Conditions and Co-Morbidity Symptoms are Monitored and Maintained or Improved: Monitor and assess patient's chronic conditions and comorbid symptoms for stability, deterioration, or improvement     Problem: Nutrition Deficit:  Goal: Optimize nutritional status  Outcome: Progressing     Problem: Pain  Goal: Verbalizes/displays adequate comfort level or baseline comfort level  8/14/2023 0508 by Sigifredo Kelley RN  Outcome: Progressing  8/13/2023 1857 by Sharlene Wright RN  Outcome: Progressing

## 2023-08-14 NOTE — PROGRESS NOTES
[facility] Rehabilitation Occupational Therapy Daily Treatment Note    Date: 23  Patient Name: Rene Rivera       Room: 9573/2677-12  MRN: [id number]  Account: [de-identified]   : 1950  (68 y.o.) Gender: female       Referring Practitioner: Dr. Marine Patel  Diagnosis: s/p ACDF C6-C7 and C2-T2 fusion (23)       Treatment Diagnosis: Impaired self-care status    Past Medical History:  has a past medical history of Allergic rhinitis, cause unspecified, Back pain, Bowel obstruction (HCC), C. difficile diarrhea, CAD (coronary artery disease), Cardiac murmur, Cellulitis, Cerebral artery occlusion with cerebral infarction ([address]), COVID-19, Diverticulosis of colon (without mention of hemorrhage), GERD (gastroesophageal reflux disease), History of blood transfusion, History of CHF (congestive heart failure), History of MI (myocardial infarction), History of ovarian cyst, History of peritonitis, HTN (hypertension), Hx of blood clots, Hyperlipidemia, Intestinal or peritoneal adhesions with obstruction (postoperative) (postinfection) ([address]), Kidney infection, Lateral epicondylitis  of elbow, MDRO (multiple drug resistant organisms) resistance, Muscle strain, Other abnormal glucose, PONV (postoperative nausea and vomiting), Pre-diabetes, Restless legs syndrome (RLS), Snores, 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[address]), TIA (transient ischemic attack), Under care of service provider, Under care of service provider, Uses walker, Vitamin D deficiency, Wears glasses, and Wellness examination. Past Surgical History:   has a past surgical history that includes Ovary removal (); colectomy (); Appendectomy (); Ovary removal (); Hysterectomy (); Bunionectomy (Left); sinus surgery (); Colonoscopy; other surgical history (2014); cyst removal (Right); Wrist fracture surgery (Left, 2019);  Upper gastrointestinal endoscopy (N/A, 2019); Daily Living  Feeding  Assistance Level: Set-up  Skilled Clinical Factors: per pt report    Grooming/Oral Hygiene  Assistance Level: Supervision  Skilled Clinical Factors: Seated in WCat sink- oral hygiene, face washing, hair combing. Upper Extremity Bathing  Assistance Level: Supervision  Skilled Clinical Factors: Seated in WC at sink. Lower Extremity Bathing  Assistance Level: Minimal assistance  Skilled Clinical Factors: seated in WC to wash BLEs with LHS and for brice hygiene, assist for buttock care CGA using wall GB by toilet for standing balance    Upper Extremity Dressing  Assistance Level: Minimal assistance  Skilled Clinical Factors: Keeps c-collar on during doffing and donning. Able to doff overhead shirt without assist, but increased time. Requires Jennifer to manage under c-collar in back and ensure shirt pulled all the way down. Lower Extremity Dressing  Assistance Level: Moderate assistance  Skilled Clinical Factors: While standing at grab bar pt was able to alternate use of each UE for management of clothing down past hips- once seated able to doff brief and pants by utilizing feet. Pt. requires increased time and use of reacher for threading of pull up and pants while seated in w/c- once standing at grab bar pt requires assist to manage clothing all the way up over hips. Putting On/Taking Off Footwear  Assistance Level: Minimal assistance  Skilled Clinical Factors: Assist for TEDs, able to doff/verena slip on shoes. Toileting  Assistance Level: Maximum assistance  Skilled Clinical Factors: A for clothing mgt, and hygeine post BM    Toilet Transfers  Equipment: Standard toilet  Additional Factors: Verbal cues;Cues for hand placement; Increased time to complete  Assistance Level: Dependent  Skilled Clinical Factors: pt utilizes natan gordillo this date for toilet transfer per pt request    Mobility     Sit to Stand  Assistance Level:  Moderate assistance  Skilled Clinical Factors: WC or recliner to

## 2023-08-14 NOTE — PROGRESS NOTES
Physical Therapy  Facility/Department: [facility] ACUTE REHAB  Rehabilitation Physical Therapy Progress Note   NAME: Rona Lundberg  : 1950 (26 y.o.)  MRN: [id number]  CODE STATUS: Full Code    Date of Service: 23      Additional Pertinent Hx: Rona Lundberg is a 68 y.o. RHD female admitted to [facility] on 2023. Tomie Belt She was admitted for scheduled cervical decompression/fusion with hardware exchange. This was performed by Dr. Caroline Hogue 23 with ACDF C6-7, anterior osteotomy 6-7 with correction of deformity, removal of previous hardware and segmental fixation/fusion from C2-T2. She has been managed in neuro ICU post-operatively and was extubated 23. She was febrile overnight  with CXR showing R infrahilar opacity. She was started on Augmentin for 5 days pneumonia treatment. SLP evaluated and diagnosed dysphagia via swallow study . She reports fairly significant pain which is responding to medications. She is having some thickened secretions. Pt on easy to chew diet and thin liquids, no straws. Pt admitted to rehab unit on 23. Family / Caregiver Present: Yes (daughter AM in pt room)  Referring Practitioner: Dr Darnell Parsons  Referral Date : 23  Diagnosis: Cervical spinal stenosis s/p ACDF C6-C7 and C2-T2 fusion (23)  General Comment  Comments: assist donning c-collar pre treatment    Restrictions:  Restrictions/Precautions: Surgical Protocols; Fall Risk  Required Braces or Orthoses  Cervical: c-collar  Position Activity Restriction  Other position/activity restrictions: HOB 30 degrees or higher; Pt never to be without pillow,  s/p ACDF C6-C7 and C2-T2 fusion (23). NO STRAWS. Per CHRISTOS Jaramillo (8/10/23) , patient may remove aspen collar for 15 min intervals- while eating and for hygiene. Pt may remove while sleeping or in bed. Pt is to maintain collar for 8 weeks.      SUBJECTIVE  Pain: denies at rest , left lateral neck to ear 5/10 2 x , supine to sit and stand to sit

## 2023-08-14 NOTE — INTERDISCIPLINARY ROUNDS
[address] Acute Inpatient Rehabilitation  INTERDISCIPLINARY MEETING  Date: 23  Patient Name: Reyes Finch       Room: 4241/6437-54  MRN: [id number]       : 1950  (68 y.o.)     Gender: female     Patient's care team, including nursing, speech language pathologist, occupational therapist, and/or physical therapist, met to discuss patient's care needs. Any patient concerns, change in medical status, and current transfer/mobility status were identified at this time.      Any Additional Pertinent Information:   Not Applicable    Participating Team Members:  Nurse: Wade Tong LPN    Occupational Therapist: NA   Physical Therapist: Abhinav Felton PT  Speech Therapist:  N/A

## 2023-08-15 PROCEDURE — 6370000000 HC RX 637 (ALT 250 FOR IP): Performed by: NURSE PRACTITIONER

## 2023-08-15 PROCEDURE — 97535 SELF CARE MNGMENT TRAINING: CPT

## 2023-08-15 PROCEDURE — 97110 THERAPEUTIC EXERCISES: CPT

## 2023-08-15 PROCEDURE — 99233 SBSQ HOSP IP/OBS HIGH 50: CPT | Performed by: PHYSICAL MEDICINE & REHABILITATION

## 2023-08-15 PROCEDURE — 83540 ASSAY OF IRON: CPT

## 2023-08-15 PROCEDURE — 6370000000 HC RX 637 (ALT 250 FOR IP): Performed by: STUDENT IN AN ORGANIZED HEALTH CARE EDUCATION/TRAINING PROGRAM

## 2023-08-15 PROCEDURE — 1180000000 HC REHAB R&B

## 2023-08-15 PROCEDURE — 83550 IRON BINDING TEST: CPT

## 2023-08-15 PROCEDURE — 99232 SBSQ HOSP IP/OBS MODERATE 35: CPT | Performed by: INTERNAL MEDICINE

## 2023-08-15 PROCEDURE — 97530 THERAPEUTIC ACTIVITIES: CPT

## 2023-08-15 PROCEDURE — 36415 COLL VENOUS BLD VENIPUNCTURE: CPT

## 2023-08-15 PROCEDURE — 6370000000 HC RX 637 (ALT 250 FOR IP): Performed by: INTERNAL MEDICINE

## 2023-08-15 PROCEDURE — 97116 GAIT TRAINING THERAPY: CPT

## 2023-08-15 RX ORDER — DOCUSATE SODIUM 100 MG/1
100 CAPSULE, LIQUID FILLED ORAL DAILY
Status: DISCONTINUED | OUTPATIENT
Start: 2023-08-16 | End: 2023-08-18 | Stop reason: HOSPADM

## 2023-08-15 RX ADMIN — OXYCODONE HYDROCHLORIDE 10 MG: 10 TABLET ORAL at 21:14

## 2023-08-15 RX ADMIN — CARVEDILOL 25 MG: 25 TABLET, FILM COATED ORAL at 09:27

## 2023-08-15 RX ADMIN — APIXABAN 5 MG: 5 TABLET, FILM COATED ORAL at 09:27

## 2023-08-15 RX ADMIN — HYDROXYZINE HYDROCHLORIDE 25 MG: 25 TABLET, FILM COATED ORAL at 21:14

## 2023-08-15 RX ADMIN — DOCUSATE SODIUM 100 MG: 50 LIQUID ORAL at 09:28

## 2023-08-15 RX ADMIN — ACETAMINOPHEN 650 MG: 325 TABLET ORAL at 09:29

## 2023-08-15 RX ADMIN — BUSPIRONE HYDROCHLORIDE 5 MG: 10 TABLET ORAL at 21:14

## 2023-08-15 RX ADMIN — CITALOPRAM HYDROBROMIDE 20 MG: 20 TABLET ORAL at 09:28

## 2023-08-15 RX ADMIN — CARVEDILOL 25 MG: 25 TABLET, FILM COATED ORAL at 17:02

## 2023-08-15 RX ADMIN — APIXABAN 5 MG: 5 TABLET, FILM COATED ORAL at 21:13

## 2023-08-15 RX ADMIN — FUROSEMIDE 40 MG: 40 TABLET ORAL at 17:03

## 2023-08-15 RX ADMIN — BUSPIRONE HYDROCHLORIDE 5 MG: 10 TABLET ORAL at 09:27

## 2023-08-15 RX ADMIN — OXYCODONE HYDROCHLORIDE 10 MG: 10 TABLET ORAL at 09:55

## 2023-08-15 RX ADMIN — FUROSEMIDE 40 MG: 40 TABLET ORAL at 09:27

## 2023-08-15 RX ADMIN — BUSPIRONE HYDROCHLORIDE 5 MG: 10 TABLET ORAL at 17:02

## 2023-08-15 RX ADMIN — LANSOPRAZOLE 15 MG: 15 TABLET, ORALLY DISINTEGRATING, DELAYED RELEASE ORAL at 06:03

## 2023-08-15 RX ADMIN — GABAPENTIN 200 MG: 100 CAPSULE ORAL at 21:14

## 2023-08-15 RX ADMIN — GABAPENTIN 200 MG: 100 CAPSULE ORAL at 09:28

## 2023-08-15 ASSESSMENT — PAIN DESCRIPTION - DESCRIPTORS
DESCRIPTORS: ACHING
DESCRIPTORS: ACHING

## 2023-08-15 ASSESSMENT — PAIN SCALES - WONG BAKER

## 2023-08-15 ASSESSMENT — PAIN SCALES - GENERAL
PAINLEVEL_OUTOF10: 2
PAINLEVEL_OUTOF10: 6

## 2023-08-15 ASSESSMENT — PAIN DESCRIPTION - LOCATION
LOCATION: NECK
LOCATION: HEAD

## 2023-08-15 NOTE — CARE COORDINATION
Received a call from Yamilka with [facility], stating unable to accept patient- insurance out of network.

## 2023-08-15 NOTE — CARE COORDINATION
ARU CASE MANAGEMENT NOTE:    Patient is alert and oriented x4. Spoke with patient and spouse regarding discharge plan: SNF- A referral was sent to the followin [facility] of [address]    Outside appointments while in ARU: None    Will continue to follow for additional discharge needs.

## 2023-08-15 NOTE — PROGRESS NOTES
Physical Therapy  Facility/Department: [facility] ACUTE REHAB  Rehabilitation Physical Therapy Progress Note     NAME: Aida Presley  : 1950 (52 y.o.)  MRN: [id number]  CODE STATUS: Full Code    Date of Service: 8/15/23            Patient assessed for rehabilitation services?: Yes  Additional Pertinent Hx: Aida Presley is a 68 y.o. RHD female admitted to [facility] on 2023. Ann Fears She was admitted for scheduled cervical decompression/fusion with hardware exchange. This was performed by Dr. Saul Marine 23 with ACDF C6-7, anterior osteotomy 6-7 with correction of deformity, removal of previous hardware and segmental fixation/fusion from C2-T2. She has been managed in neuro ICU post-operatively and was extubated 23. She was febrile overnight  with CXR showing R infrahilar opacity. She was started on Augmentin for 5 days pneumonia treatment. SLP evaluated and diagnosed dysphagia via swallow study . She reports fairly significant pain which is responding to medications. She is having some thickened secretions. Pt on easy to chew diet and thin liquids, no straws. Pt admitted to rehab unit on 23. Family / Caregiver Present: No  Referring Practitioner: Dr Chana Avilez  Referral Date : 23  Diagnosis: Cervical spinal stenosis s/p ACDF C6-C7 and C2-T2 fusion (23)    Restrictions:  Restrictions/Precautions: Surgical Protocols; Fall Risk  Required Braces or Orthoses  Cervical: c-collar  Position Activity Restriction  Other position/activity restrictions: HOB 30 degrees or higher; Pt never to be without pillow,  s/p ACDF C6-C7 and C2-T2 fusion (23). NO STRAWS. Per CHRISTOS Roa (8/10/23) , patient may remove aspen collar for 15 min intervals- while eating and for hygiene. Pt may remove while sleeping or in bed. Pt is to maintain collar for 8 weeks.      SUBJECTIVE  Pain: Denies pain this AM         OBJECTIVE  Vision  Vision: Impaired  Vision Exceptions: Wears glasses at all

## 2023-08-15 NOTE — PROGRESS NOTES
Physical Therapy  Facility/Department: [facility] ACUTE REHAB  Rehabilitation Physical Therapy Initial Assessment    NAME: Eda Hernandez  : 1950 (68 y.o.)  MRN: [id number]  CODE STATUS: Full Code    Date of Service: 8/15/23      Past Medical History:   Diagnosis Date    Allergic rhinitis, cause unspecified     Back pain     lumbar    Bowel obstruction (HCC)     history of due to scar tissue, resolved non-surgically    C. difficile diarrhea     CAD (coronary artery disease)     no stent needed per pt.  Dr. Alicia Whitehead did cath at      Cardiac murmur     Cellulitis 2017    leg left leg/bug bite    Cerebral artery occlusion with cerebral infarction [facility])     TIA 2014    COVID-19     ONE YR AGO IN 2020 fever and cough    Diverticulosis of colon (without mention of hemorrhage)     GERD (gastroesophageal reflux disease)     on rx    History of blood transfusion     approx     -no reactions    History of CHF (congestive heart failure)     History of MI (myocardial infarction)     thought due to a blood clot    History of ovarian cyst     had oopherectomy holly    History of peritonitis     due to ruptured appendix age 13    HTN (hypertension)     Hx of blood clots     right leg    Hyperlipidemia     Intestinal or peritoneal adhesions with obstruction (postoperative) (postinfection) ([address])     Kidney infection     renal failure/sepsis/spider bite    Lateral epicondylitis  of elbow     MDRO (multiple drug resistant organisms) resistance     c diff    Muscle strain     right posterior shoulder    Other abnormal glucose     PONV (postoperative nausea and vomiting)     dry heaves    Pre-diabetes     Restless legs syndrome (RLS)     Snores     no cpap    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)     TIA (transient ischemic attack)     Under care of service provider 2023    [id number]-[id number]-last visit 2023    Under care of service provider 2023    [id number]-[id number]-qg rail.  Long Term Goal 2: Pt able to perform fucntional transfers at min A from 18\" or higher surfaces. Long Term Goal 3: Pt able to ambulate with RW, distance of 20 to 50 ft, min A, level surfaces  Long Term Goal 4: Pt able to take 5 to 10 steps outside terraine (parking lot/side walk) with rolling walker, mod A  Long Term Goal 5: Pt able to propel w/ distance upto 50 ft, level surfaces, SBA  Long term goal 6: Pt to demonstrate fair to fair+ technique for HEP for LE to continue at home    PLAN OF CARE  Frequency: 1-2 treatment sessions per day, 5-7 days per week  Physcial Therapy Plan  General Plan:  minutes of therapy at least 5 out of 7 days a week (1.5 hr/day, 5 to 7 days/week)  Specific Instructions for Next Treatment: Continue to use natan stedy for transfers, work on sit<>stand and stepping in // bars. Current Treatment Recommendations: Strengthening;ROM;Balance training;Functional mobility training;Transfer training; Endurance training;Gait training;Home exercise program;Safety education & training; Therapeutic activities; Patient/Caregiver education & training;Neuromuscular re-education;Equipment evaluation, education, & procurement;Positioning; Wheelchair mobility training  Safety Devices  Type of Devices: Patient at risk for falls;Gait belt;Left in bed;Call light within reach; Bed alarm in place;Nurse notified; All fall risk precautions in place  Restraints  Restraints Initially in Place: No    EDUCATION  Education  Education Given To: Patient  Education Provided: Plan of Care;Precautions; Safety; Mobility Training;Transfer Training; Fall Prevention Strategies  Education Provided Comments: transfers training and gait training  Education Method: Demonstration;Verbal  Education Outcome: Verbalized understanding;Continued education needed          Therapy Time   Individual Concurrent Group Co-treatment   Time In 0800         Time Out 0903         Minutes [address], PTA, 08/15/23 at

## 2023-08-15 NOTE — PROGRESS NOTES
[address] [facility]   Acute Rehabilitation Occupational Therapy Daily Treatment Note    Date: 8/15/23  Patient Name: Reyes Finch       Room: 3185/5451-10  MRN: [id number]  Account: [de-identified]   : 1950  (68 y.o.) Gender: female       Referring Practitioner: Dr. Maricruz Hyatt  Diagnosis: s/p ACDF C6-C7 and C2-T2 fusion (23)       Treatment Diagnosis: Impaired self-care status    Past Medical History:  has a past medical history of Allergic rhinitis, cause unspecified, Back pain, Bowel obstruction ([address]), C. difficile diarrhea, CAD (coronary artery disease), Cardiac murmur, Cellulitis, Cerebral artery occlusion with cerebral infarction ([address]), COVID-19, Diverticulosis of colon (without mention of hemorrhage), GERD (gastroesophageal reflux disease), History of blood transfusion, History of CHF (congestive heart failure), History of MI (myocardial infarction), History of ovarian cyst, History of peritonitis, HTN (hypertension), Hx of blood clots, Hyperlipidemia, Intestinal or peritoneal adhesions with obstruction (postoperative) (postinfection) ([address]), Kidney infection, Lateral epicondylitis  of elbow, MDRO (multiple drug resistant organisms) resistance, Muscle strain, Other abnormal glucose, PONV (postoperative nausea and vomiting), Pre-diabetes, Restless legs syndrome (RLS), Snores, 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[address]), TIA (transient ischemic attack), Under care of service provider, Under care of service provider, Uses walker, Vitamin D deficiency, Wears glasses, and Wellness examination. Past Surgical History:   has a past surgical history that includes Ovary removal (); colectomy (); Appendectomy (); Ovary removal (); Hysterectomy (); Bunionectomy (Left); sinus surgery (); Colonoscopy; other surgical history (2014); cyst removal (Right); Wrist fracture surgery (Left, 2019); Upper gastrointestinal endoscopy (N/A, 2019);  Upper gastrointestinal

## 2023-08-15 NOTE — PROGRESS NOTES
Physical Medicine & Rehabilitation  Progress Note      Subjective:      68year-old female with cervical stenosis treated with C6-7 ACDF and posterior C2-T2 fusion. Patient is noting some improvement in her pain today. She has intermittent R sided headache which is responding to prn medications. No new issues with sleep, appetite, bowel, or bladder. ROS:  Denies fevers, chills, sweats. No chest pain, palpitations, lightheadedness. Denies coughing, wheezing or shortness of breath. Denies abdominal pain, nausea, diarrhea or constipation. No new areas of joint pain. Denies new areas of numbness or weakness. Denies new anxiety or depression issues. No new skin problems. Rehabilitation:   Progressing in therapies. PT:    Bed mobility  Rolling to Left: Maximum assistance  Rolling to Right: Maximum assistance  Supine to Sit: Minimal assistance (upper trunk)  Sit to Supine: Maximum assistance (trunk and right LE management)  Scooting: Moderate assistance (to EOB)  Bed Mobility Comments: head of bed elevated 30 degrees  . Daughter reports pt sleeps in recliner         Transfers  Sit to Stand: Moderate Assistance (Pt tends to push posteriorly when going to stand. Cues for foot placement, scooting forward to edge of chair and use of forward lean, poor carryover. Marilyn to stand with natan stedy)  Stand to Sit: Minimal Assistance (good eccentric control)  Bed to Chair: Dependent/Total (natan steady)  Stand Pivot Transfers: Dependent/Total (natan stedy w/c <> nustep)  Squat Pivot Transfers: Moderate Assistance, 2 Person Assistance (for safety , no AD , VCs for technique)  Comment: Increased time to complete all transfers. Ambulation  WB Status:  (-)  Ambulation  Surface: Level tile  Device: Parallel Bars  Other Apparatus: Wheelchair follow (second person)  Assistance:  Moderate assistance, 2 Person assistance  Quality of Gait: trunk flexion, right trunk rotation  Gait Deviations: Slow Alicia, Decreased step

## 2023-08-16 LAB
IRON SATN MFR SERPL: 21 % (ref 20–55)
IRON SERPL-MCNC: 46 UG/DL (ref 37–145)
TIBC SERPL-MCNC: 217 UG/DL (ref 250–450)
UNSATURATED IRON BINDING CAPACITY: 171 UG/DL (ref 112–347)

## 2023-08-16 PROCEDURE — 6370000000 HC RX 637 (ALT 250 FOR IP): Performed by: INTERNAL MEDICINE

## 2023-08-16 PROCEDURE — 97535 SELF CARE MNGMENT TRAINING: CPT

## 2023-08-16 PROCEDURE — 97542 WHEELCHAIR MNGMENT TRAINING: CPT

## 2023-08-16 PROCEDURE — 6370000000 HC RX 637 (ALT 250 FOR IP): Performed by: NURSE PRACTITIONER

## 2023-08-16 PROCEDURE — 1180000000 HC REHAB R&B

## 2023-08-16 PROCEDURE — 99232 SBSQ HOSP IP/OBS MODERATE 35: CPT | Performed by: PHYSICAL MEDICINE & REHABILITATION

## 2023-08-16 PROCEDURE — 97530 THERAPEUTIC ACTIVITIES: CPT

## 2023-08-16 PROCEDURE — 97116 GAIT TRAINING THERAPY: CPT

## 2023-08-16 PROCEDURE — 6370000000 HC RX 637 (ALT 250 FOR IP): Performed by: STUDENT IN AN ORGANIZED HEALTH CARE EDUCATION/TRAINING PROGRAM

## 2023-08-16 PROCEDURE — 6370000000 HC RX 637 (ALT 250 FOR IP): Performed by: PHYSICAL MEDICINE & REHABILITATION

## 2023-08-16 PROCEDURE — 99232 SBSQ HOSP IP/OBS MODERATE 35: CPT | Performed by: INTERNAL MEDICINE

## 2023-08-16 RX ORDER — LANOLIN ALCOHOL/MO/W.PET/CERES
3 CREAM (GRAM) TOPICAL NIGHTLY
Status: DISCONTINUED | OUTPATIENT
Start: 2023-08-16 | End: 2023-08-18 | Stop reason: HOSPADM

## 2023-08-16 RX ORDER — AMLODIPINE BESYLATE 2.5 MG/1
2.5 TABLET ORAL DAILY
Status: DISCONTINUED | OUTPATIENT
Start: 2023-08-16 | End: 2023-08-18 | Stop reason: HOSPADM

## 2023-08-16 RX ADMIN — GABAPENTIN 200 MG: 100 CAPSULE ORAL at 07:46

## 2023-08-16 RX ADMIN — OXYCODONE HYDROCHLORIDE 10 MG: 10 TABLET ORAL at 21:32

## 2023-08-16 RX ADMIN — FUROSEMIDE 40 MG: 40 TABLET ORAL at 17:15

## 2023-08-16 RX ADMIN — APIXABAN 5 MG: 5 TABLET, FILM COATED ORAL at 21:32

## 2023-08-16 RX ADMIN — CARVEDILOL 25 MG: 25 TABLET, FILM COATED ORAL at 07:46

## 2023-08-16 RX ADMIN — BUSPIRONE HYDROCHLORIDE 5 MG: 10 TABLET ORAL at 15:18

## 2023-08-16 RX ADMIN — APIXABAN 5 MG: 5 TABLET, FILM COATED ORAL at 07:46

## 2023-08-16 RX ADMIN — CARVEDILOL 25 MG: 25 TABLET, FILM COATED ORAL at 17:15

## 2023-08-16 RX ADMIN — POLYETHYLENE GLYCOL 3350 17 G: 17 POWDER, FOR SOLUTION ORAL at 07:47

## 2023-08-16 RX ADMIN — AMLODIPINE BESYLATE 2.5 MG: 2.5 TABLET ORAL at 18:07

## 2023-08-16 RX ADMIN — FUROSEMIDE 40 MG: 40 TABLET ORAL at 07:46

## 2023-08-16 RX ADMIN — BUSPIRONE HYDROCHLORIDE 5 MG: 10 TABLET ORAL at 07:48

## 2023-08-16 RX ADMIN — LANSOPRAZOLE 15 MG: 15 TABLET, ORALLY DISINTEGRATING, DELAYED RELEASE ORAL at 06:24

## 2023-08-16 RX ADMIN — DOCUSATE SODIUM 100 MG: 100 CAPSULE, LIQUID FILLED ORAL at 07:46

## 2023-08-16 RX ADMIN — Medication 3 MG: at 23:44

## 2023-08-16 RX ADMIN — GABAPENTIN 200 MG: 100 CAPSULE ORAL at 21:32

## 2023-08-16 RX ADMIN — CITALOPRAM HYDROBROMIDE 20 MG: 20 TABLET ORAL at 07:46

## 2023-08-16 RX ADMIN — BUSPIRONE HYDROCHLORIDE 5 MG: 10 TABLET ORAL at 21:34

## 2023-08-16 ASSESSMENT — PAIN SCALES - WONG BAKER

## 2023-08-16 ASSESSMENT — PAIN SCALES - GENERAL: PAINLEVEL_OUTOF10: 7

## 2023-08-16 NOTE — PROGRESS NOTES
Physical Therapy  Facility/Department: [facility] ACUTE REHAB  Rehabilitation Physical Therapy Progress Note   NAME: Gilbert Conn  : 1950 (76 y.o.)  MRN: [id number]  CODE STATUS: Full Code    Date of Service: 23            Additional Pertinent Hx: Gilbert Conn is a 68 y.o. RHD female admitted to [facility] on 2023. Gareth Pace She was admitted for scheduled cervical decompression/fusion with hardware exchange. This was performed by Dr. Ajay Schilling 23 with ACDF C6-7, anterior osteotomy 6-7 with correction of deformity, removal of previous hardware and segmental fixation/fusion from C2-T2. She has been managed in neuro ICU post-operatively and was extubated 23. She was febrile overnight  with CXR showing R infrahilar opacity. She was started on Augmentin for 5 days pneumonia treatment. SLP evaluated and diagnosed dysphagia via swallow study . She reports fairly significant pain which is responding to medications. She is having some thickened secretions. Pt on easy to chew diet and thin liquids, no straws. Pt admitted to rehab unit on 23. Family / Caregiver Present: Yes (daughter AM)  Referring Practitioner: Dr Lashonda Butler  Referral Date : 23  Diagnosis: Cervical spinal stenosis s/p ACDF C6-C7 and C2-T2 fusion (23)  General Comment  Comments: assist to verena c-collar in supine pre bed mobility    Restrictions:  Restrictions/Precautions: Surgical Protocols; Fall Risk  Required Braces or Orthoses  Cervical: c-collar  Position Activity Restriction  Other position/activity restrictions: HOB 30 degrees or higher; Pt never to be without pillow,  s/p ACDF C6-C7 and C2-T2 fusion (23). NO STRAWS. Per CHRISTOS Landrum (8/10/23) , patient may remove aspen collar for 15 min intervals- while eating and for hygiene. Pt may remove while sleeping or in bed. Pt is to maintain collar for 8 weeks.      SUBJECTIVE  Pain: left neck to ear 2/10 pre treatment 0/10 during treatment      OBJECTIVE  Vision  Vision:

## 2023-08-16 NOTE — PROGRESS NOTES
[facility]   Acute Rehabilitation Occupational Therapy Daily Treatment Note    Date: 23  Patient Name: Ana Maria Wyatt       Room: 7163/4746-68  MRN: [id number]  Account: [de-identified]   : 1950  (68 y.o.) Gender: female       Referring Practitioner: Dr. Venice Jones  Diagnosis: s/p ACDF C6-C7 and C2-T2 fusion (23)       Treatment Diagnosis: Impaired self-care status    Past Medical History:  has a past medical history of Allergic rhinitis, cause unspecified, Back pain, Bowel obstruction (HCC), C. difficile diarrhea, CAD (coronary artery disease), Cardiac murmur, Cellulitis, Cerebral artery occlusion with cerebral infarction ([address]), COVID-19, Diverticulosis of colon (without mention of hemorrhage), GERD (gastroesophageal reflux disease), History of blood transfusion, History of CHF (congestive heart failure), History of MI (myocardial infarction), History of ovarian cyst, History of peritonitis, HTN (hypertension), Hx of blood clots, Hyperlipidemia, Intestinal or peritoneal adhesions with obstruction (postoperative) (postinfection) ([address]), Kidney infection, Lateral epicondylitis  of elbow, MDRO (multiple drug resistant organisms) resistance, Muscle strain, Other abnormal glucose, PONV (postoperative nausea and vomiting), Pre-diabetes, Restless legs syndrome (RLS), Snores, 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[address]), TIA (transient ischemic attack), Under care of service provider, Under care of service provider, Uses walker, Vitamin D deficiency, Wears glasses, and Wellness examination. Past Surgical History:   has a past surgical history that includes Ovary removal (); colectomy (); Appendectomy (); Ovary removal (); Hysterectomy (); Bunionectomy (Left); sinus surgery (); Colonoscopy; other surgical history (2014); cyst removal (Right); Wrist fracture surgery (Left, 2019); Upper gastrointestinal endoscopy (N/A, 2019);  Upper gastrointestinal

## 2023-08-16 NOTE — INTERDISCIPLINARY ROUNDS
Physical Therapy      [facility] Acute Inpatient Rehabilitation  INTERDISCIPLINARY MEETING  Date: 23  Patient Name: Ines Fitzgerald       Room: 9841/5093-51  MRN: [id number]       : 1950  (68 y.o.)     Gender: female     Patient's care team, including nursing, speech language pathologist, occupational therapist, and/or physical therapist, met to discuss patient's care needs. Any patient concerns, change in medical status, and current transfer/mobility status were identified at this time.      Any Additional Pertinent Information:   Not Applicable    Participating Team Members:  Nurse: Kina Martínez LPN    Occupational Therapist:  Andria Mccormack OT   Physical Therapist: Reina Rivera PT  Speech Therapist:  N/A

## 2023-08-16 NOTE — PLAN OF CARE
Problem: Nutrition Deficit:  Goal: Optimize nutritional status  8/16/2023 1643 by Verona Davis LPN  Outcome: Progressing     Problem: Pain  Goal: Verbalizes/displays adequate comfort level or baseline comfort level  8/16/2023 1643 by Verona Davis LPN  Outcome: Progressing

## 2023-08-16 NOTE — PROGRESS NOTES
on 7/26/23 (Dr. Harlan Joseph). PT/OT for gait, mobility, strengthening, endurance, ADLs, and self care. Cervical collar when out of bed (okay to remove while resting in bed and briefly for hygiene). Pain control with gabapentin - increased for 8/9, as-needed tylenol, as-needed oxycodone (gave one-time extra dose on 8/13 for increased pain; no new neurologic deficits). Appointment with neurosurgery was switched to virtual for 8/10 - staples removed after appointment per neurosurgery. Added lidocaine patch nightly on 8/13. Dysphagia:  On easy to chew solids, thin liquids. SLP following. Repeat MBS on 8/11 with no significant changes in diet. Anemia:  Hemoglobin low but stable. Monitoring. CAD, CHF:  Echo 4/2023 with EF > 55%. On lasix  HTN:  On carvedilol - IM increased dose on 8/13, lasix  HLD:  Not currently on medication  Insomnia: Added melatonin 8/16. GERD:  On prevacid  Anxiety: On buspar, celexa. Has atarax as needed  Obesity:  BMI 33.66. Dietician following. Bowel Management: Miralax daily, senokot prn, dulcolax prn. DVT prophylaxis:  On eliquis  Internal Medicine for medical management  Follow up PCP 1-2 weeks, PM&R - Dr. Sindhu Donahue 4-6 weeks, Neurosurgery  Discussed discharge plan with patient and spouse 8/14. He has health problems and is unable to provide significant physical support at home currently. After 12 days in acute rehab, she is still requiring natan steady transfers with multiple staff. Anticipate she will require care in SNF setting prior to returning home.  arranging placement. Electronically signed by Jesus Clayton MD on 8/16/2023 at 10:21 AM      This note is created with the assistance of a speech recognition program.  While intending to generate a document that actually reflects the content of the visit, the document can still have some errors including those of syntax and sound a like substitutions which may escape proof reading.   In such instances, actual meaning can be extrapolated by contextual diversion.

## 2023-08-16 NOTE — CARE COORDINATION
ARU CASE MANAGEMENT NOTE:    Patient is alert and oriented x4. Spoke with patient regarding discharge plan: SNF    Received a call from [facility] with [facility], stating she will start precert. Outside appointments while in ARU: None    Will continue to follow for additional discharge needs.

## 2023-08-16 NOTE — PLAN OF CARE
Problem: Discharge Planning  Goal: Discharge to home or other facility with appropriate resources  8/16/2023 0542 by Sridevi Gutiérrez RN  Outcome: Progressing     Problem: Skin/Tissue Integrity  Goal: Absence of new skin breakdown  Description: 1. Monitor for areas of redness and/or skin breakdown  2. Assess vascular access sites hourly  3. Every 4-6 hours minimum:  Change oxygen saturation probe site  4. Every 4-6 hours:  If on nasal continuous positive airway pressure, respiratory therapy assess nares and determine need for appliance change or resting period.   8/16/2023 0542 by Sridevi Gutiérrez RN  Outcome: Progressing     Problem: Safety - Adult  Goal: Free from fall injury  8/16/2023 0542 by Sridevi Gutiérrez RN  Outcome: Progressing     Problem: ABCDS Injury Assessment  Goal: Absence of physical injury  8/16/2023 0542 by Sridevi Gutiérrez RN  Outcome: Progressing     Problem: Chronic Conditions and Co-morbidities  Goal: Patient's chronic conditions and co-morbidity symptoms are monitored and maintained or improved  8/16/2023 0542 by Sridevi Gutiérrez RN  Outcome: Progressing     Problem: Nutrition Deficit:  Goal: Optimize nutritional status  8/16/2023 0542 by Sridevi Gutiérrez RN  Outcome: Progressing     Problem: Pain  Goal: Verbalizes/displays adequate comfort level or baseline comfort level  8/16/2023 0542 by Sridevi Gutiérrez RN  Outcome: Progressing

## 2023-08-17 PROCEDURE — 97535 SELF CARE MNGMENT TRAINING: CPT

## 2023-08-17 PROCEDURE — 6370000000 HC RX 637 (ALT 250 FOR IP): Performed by: STUDENT IN AN ORGANIZED HEALTH CARE EDUCATION/TRAINING PROGRAM

## 2023-08-17 PROCEDURE — 6370000000 HC RX 637 (ALT 250 FOR IP): Performed by: NURSE PRACTITIONER

## 2023-08-17 PROCEDURE — 99232 SBSQ HOSP IP/OBS MODERATE 35: CPT | Performed by: INTERNAL MEDICINE

## 2023-08-17 PROCEDURE — 6370000000 HC RX 637 (ALT 250 FOR IP): Performed by: INTERNAL MEDICINE

## 2023-08-17 PROCEDURE — 97112 NEUROMUSCULAR REEDUCATION: CPT

## 2023-08-17 PROCEDURE — 97530 THERAPEUTIC ACTIVITIES: CPT

## 2023-08-17 PROCEDURE — 97116 GAIT TRAINING THERAPY: CPT

## 2023-08-17 PROCEDURE — 1180000000 HC REHAB R&B

## 2023-08-17 PROCEDURE — 6370000000 HC RX 637 (ALT 250 FOR IP): Performed by: PHYSICAL MEDICINE & REHABILITATION

## 2023-08-17 RX ORDER — CARVEDILOL 25 MG/1
25 TABLET ORAL 2 TIMES DAILY WITH MEALS
Qty: 60 TABLET | Refills: 3 | DISCHARGE
Start: 2023-08-18

## 2023-08-17 RX ORDER — GABAPENTIN 100 MG/1
200 CAPSULE ORAL 2 TIMES DAILY
Qty: 90 CAPSULE | Refills: 3 | DISCHARGE
Start: 2023-08-18 | End: 2023-11-16

## 2023-08-17 RX ORDER — SENNOSIDES A AND B 8.6 MG/1
2 TABLET, FILM COATED ORAL DAILY PRN
DISCHARGE
Start: 2023-08-17 | End: 2023-09-16

## 2023-08-17 RX ORDER — ONDANSETRON 4 MG/1
4 TABLET, ORALLY DISINTEGRATING ORAL EVERY 8 HOURS PRN
DISCHARGE
Start: 2023-08-17

## 2023-08-17 RX ORDER — BISACODYL 10 MG
10 SUPPOSITORY, RECTAL RECTAL DAILY PRN
DISCHARGE
Start: 2023-08-17 | End: 2023-09-16

## 2023-08-17 RX ORDER — AMLODIPINE BESYLATE 2.5 MG/1
2.5 TABLET ORAL DAILY
Qty: 30 TABLET | Refills: 3 | DISCHARGE
Start: 2023-08-18

## 2023-08-17 RX ORDER — PSEUDOEPHEDRINE HCL 30 MG
100 TABLET ORAL DAILY
DISCHARGE
Start: 2023-08-18

## 2023-08-17 RX ORDER — ALBUTEROL SULFATE 90 UG/1
2 AEROSOL, METERED RESPIRATORY (INHALATION) EVERY 6 HOURS PRN
Qty: 18 G | Refills: 3 | DISCHARGE
Start: 2023-08-17

## 2023-08-17 RX ORDER — POLYETHYLENE GLYCOL 3350 17 G/17G
17 POWDER, FOR SOLUTION ORAL DAILY
Qty: 527 G | Refills: 1 | DISCHARGE
Start: 2023-08-18

## 2023-08-17 RX ORDER — HYDROXYZINE HYDROCHLORIDE 25 MG/1
25 TABLET, FILM COATED ORAL 3 TIMES DAILY PRN
DISCHARGE
Start: 2023-08-17 | End: 2023-08-27

## 2023-08-17 RX ORDER — CARBOXYMETHYLCELLULOSE SODIUM 10 MG/ML
1 GEL OPHTHALMIC 3 TIMES DAILY PRN
Refills: 0 | DISCHARGE
Start: 2023-08-17

## 2023-08-17 RX ORDER — OXYCODONE HYDROCHLORIDE 10 MG/1
10 TABLET ORAL EVERY 4 HOURS PRN
Qty: 18 TABLET | Refills: 0 | Status: SHIPPED | OUTPATIENT
Start: 2023-08-17 | End: 2023-08-20

## 2023-08-17 RX ORDER — LIDOCAINE 4 G/G
1 PATCH TOPICAL NIGHTLY
DISCHARGE
Start: 2023-08-18

## 2023-08-17 RX ORDER — FUROSEMIDE 40 MG/1
40 TABLET ORAL 2 TIMES DAILY
Qty: 60 TABLET | Refills: 3 | DISCHARGE
Start: 2023-08-18

## 2023-08-17 RX ORDER — OXYCODONE HYDROCHLORIDE 5 MG/1
5 TABLET ORAL EVERY 4 HOURS PRN
Qty: 18 TABLET | Refills: 0 | Status: SHIPPED | OUTPATIENT
Start: 2023-08-17 | End: 2023-08-20

## 2023-08-17 RX ADMIN — Medication 3 MG: at 20:00

## 2023-08-17 RX ADMIN — APIXABAN 5 MG: 5 TABLET, FILM COATED ORAL at 20:00

## 2023-08-17 RX ADMIN — CITALOPRAM HYDROBROMIDE 20 MG: 20 TABLET ORAL at 07:28

## 2023-08-17 RX ADMIN — APIXABAN 5 MG: 5 TABLET, FILM COATED ORAL at 07:28

## 2023-08-17 RX ADMIN — CARVEDILOL 25 MG: 25 TABLET, FILM COATED ORAL at 07:28

## 2023-08-17 RX ADMIN — BUSPIRONE HYDROCHLORIDE 5 MG: 10 TABLET ORAL at 07:31

## 2023-08-17 RX ADMIN — GABAPENTIN 200 MG: 100 CAPSULE ORAL at 07:28

## 2023-08-17 RX ADMIN — DOCUSATE SODIUM 100 MG: 100 CAPSULE, LIQUID FILLED ORAL at 07:28

## 2023-08-17 RX ADMIN — AMLODIPINE BESYLATE 2.5 MG: 2.5 TABLET ORAL at 07:28

## 2023-08-17 RX ADMIN — FUROSEMIDE 40 MG: 40 TABLET ORAL at 20:01

## 2023-08-17 RX ADMIN — LANSOPRAZOLE 15 MG: 15 TABLET, ORALLY DISINTEGRATING, DELAYED RELEASE ORAL at 05:54

## 2023-08-17 RX ADMIN — OXYCODONE HYDROCHLORIDE 10 MG: 10 TABLET ORAL at 19:56

## 2023-08-17 RX ADMIN — OXYCODONE HYDROCHLORIDE 10 MG: 10 TABLET ORAL at 09:34

## 2023-08-17 RX ADMIN — OXYCODONE HYDROCHLORIDE 10 MG: 10 TABLET ORAL at 13:21

## 2023-08-17 RX ADMIN — BUSPIRONE HYDROCHLORIDE 5 MG: 10 TABLET ORAL at 20:07

## 2023-08-17 RX ADMIN — BUSPIRONE HYDROCHLORIDE 5 MG: 10 TABLET ORAL at 14:41

## 2023-08-17 RX ADMIN — FUROSEMIDE 40 MG: 40 TABLET ORAL at 07:28

## 2023-08-17 RX ADMIN — POLYETHYLENE GLYCOL 3350 17 G: 17 POWDER, FOR SOLUTION ORAL at 07:28

## 2023-08-17 RX ADMIN — GABAPENTIN 200 MG: 100 CAPSULE ORAL at 20:00

## 2023-08-17 RX ADMIN — OXYCODONE HYDROCHLORIDE 10 MG: 10 TABLET ORAL at 23:56

## 2023-08-17 ASSESSMENT — PAIN SCALES - WONG BAKER

## 2023-08-17 ASSESSMENT — PAIN SCALES - GENERAL
PAINLEVEL_OUTOF10: 8
PAINLEVEL_OUTOF10: 7
PAINLEVEL_OUTOF10: 10

## 2023-08-17 ASSESSMENT — PAIN DESCRIPTION - LOCATION
LOCATION: NECK
LOCATION: NECK

## 2023-08-17 NOTE — PROGRESS NOTES
Physical Therapy  Facility/Department: [facility] ACUTE REHAB  Rehabilitation Physical Therapy   NAME: Abimael Bernardo  : 1950 (68 y.o.)  MRN: [id number]  CODE STATUS: Full Code    Date of Service: 23      Past Medical History:   Diagnosis Date    Allergic rhinitis, cause unspecified     Back pain     lumbar    Bowel obstruction (HCC)     history of due to scar tissue, resolved non-surgically    C. difficile diarrhea     CAD (coronary artery disease)     no stent needed per pt.  Dr. Brennon Morley did cath at      Cardiac murmur     Cellulitis 2017    leg left leg/bug bite    Cerebral artery occlusion with cerebral infarction [facility])     TIA 2014    COVID-19     ONE YR AGO IN 2020 fever and cough    Diverticulosis of colon (without mention of hemorrhage)     GERD (gastroesophageal reflux disease)     on rx    History of blood transfusion     approx     -no reactions    History of CHF (congestive heart failure)     History of MI (myocardial infarction)     thought due to a blood clot    History of ovarian cyst     had oopherectomy holly    History of peritonitis     due to ruptured appendix age 13    HTN (hypertension)     Hx of blood clots     right leg    Hyperlipidemia     Intestinal or peritoneal adhesions with obstruction (postoperative) (postinfection) ([address])     Kidney infection     renal failure/sepsis/spider bite    Lateral epicondylitis  of elbow     MDRO (multiple drug resistant organisms) resistance     c diff    Muscle strain     right posterior shoulder    Other abnormal glucose     PONV (postoperative nausea and vomiting)     dry heaves    Pre-diabetes     Restless legs syndrome (RLS)     Snores     no cpap    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)     TIA (transient ischemic attack)     Under care of service provider 2023    mtdhawhfza-wumltt-kwgipw-last visit 2023    Under care of service provider 2023    jkwovnwwp-vvz-oy vincent-last visit may step length; Slow Alicia;Decreased step height  Distance: 7ft fwd/7ft retro x 2 laps  Comments: Writer positioned behind pt working on wt shifting and posture with gait (Pt continues to state that she is fearful of falling.)  Stairs/Curb  Stairs?: No    PT Exercises  Exercise Treatment: transfer bed to toilet utilizing natan steady first thing in morning. Pt dependant for clothing managment and hygine. Functional Mobility Circuit Training: multiple transfers sit <> stand WC<> RW  Dynamic Sitting Balance Exercises: anterior reaching sitting in w/c facing mat table reaching for cones with (B) UE's  Dynamic Standing Balance Exercises: Standing kika on natan steady: 60sec x 1, 90sec x 1,Standing in // bars 3min x 1, 2min x 1  Postural Correction Exercises: tactile and vcs for trunk extension  Standing Open/Closed Kinetic Chain Exercises: standing (B) LE ex in // bars with hips pushed fwd into t-band for visual and tactile biofeedback with posture x 10ea      Activity Tolerance  Activity Tolerance: Patient limited by endurance; Patient limited by pain  Activity Tolerance Comments: cervical pain limiting tolerance to activity this date. Pt would moan out in pain during am/pm session grabbing at back of neck. Pt became tearful in pm session. RN notified and pain medication was given during pm session. Assessment  Performance Deficits/Impairments: Decreased functional mobility ; Decreased strength;Decreased safe awareness;Decreased cognition;Decreased endurance;Decreased balance; Increased pain;Decreased posture;Decreased ROM  Treatment Diagnosis: Impaired function. Therapy Prognosis: Fair  Decision Making: Medium Complexity  History: Hx of multiple back surgeries, c-spine surgery, Hx of TIA. Barriers to Learning: Cognitive deficts  Discharge Recommendations: Patient would benefit from continued therapy after discharge; Therapy recommended at discharge;Home with Home health PT;Home with assist PRN  PT Equipment

## 2023-08-17 NOTE — PLAN OF CARE
Problem: Discharge Planning  Goal: Discharge to home or other facility with appropriate resources  8/17/2023 1621 by Pam Tarore LPN  Outcome: Progressing     Problem: Skin/Tissue Integrity  Goal: Absence of new skin breakdown  Description: 1. Monitor for areas of redness and/or skin breakdown  2. Assess vascular access sites hourly  3. Every 4-6 hours minimum:  Change oxygen saturation probe site  4. Every 4-6 hours:  If on nasal continuous positive airway pressure, respiratory therapy assess nares and determine need for appliance change or resting period.   8/17/2023 1621 by Pam Traore LPN  Outcome: Progressing     Problem: Safety - Adult  Goal: Free from fall injury  8/17/2023 1621 by Pam Traore LPN  Outcome: Progressing

## 2023-08-17 NOTE — CARE COORDINATION
Received a call from [facility] with [facility], stating precert was approved and are able to accept patient. [facility] transportation scheduled for tomorrow 08/18  time at 1 pm via stretcher. Patient and Dr. Boubacar Villatoro notified. Call RN to give report at [phone].

## 2023-08-17 NOTE — PROGRESS NOTES
Physical Medicine & Rehabilitation  Progress Note      Subjective:      68year-old female with cervical stenosis treated with C6-7 ACDF and posterior C2-T2 fusion. Patient is noting some continued issues with intermittent shooting pain L posterior neck which is mild-moderate and resolves spontaneously. She denies any new issues with sleep, appetite, bowel, or bladder. ROS:  Denies fevers, chills, sweats. No chest pain, palpitations, lightheadedness. Denies coughing, wheezing or shortness of breath. Denies abdominal pain, nausea, diarrhea or constipation. No new areas of joint pain. Denies new areas of numbness or weakness. Denies new anxiety or depression issues. No new skin problems. Rehabilitation:   Progressing in therapies. PT:    Bed mobility  Rolling to Left: Moderate assistance  Rolling to Right: Maximum assistance  Supine to Sit: Moderate assistance (uprighting trunk)  Sit to Supine: Maximum assistance (LEs and trunk central position)  Scooting: Maximal assistance (to EOB)  Bed Mobility Comments: no hand rail bed flat         Transfers  Sit to Stand: Maximum Assistance, 2 Person Assistance (WC to RW . Pt tends to push posteriorly left quads . Cues and assist  for left  foot placement, scooting forward to edge of chair and trunk flexion  . Block L foot when going to stand, second person to stabilize WC. CGA -mod UE assist at grab bar)  Stand to Sit: Moderate Assistance (for trunk flexion and controlled descent)  Bed to Chair: Maximum assistance (no AD , 1 step VCs sequencing , assist anterior and left weight shift , increased time)  Stand Pivot Transfers: Moderate Assistance (at grab bar to/from cammode left UE advancement to WC arm rest during rotation near cammode)  Squat Pivot Transfers: Maximum Assistance, 2 Person Assistance (and SBA x 1 for safety, VCs for technique , sequencing , increased foot clearance)  Comment: Increased time to complete all transfers.  Pt tends to push heavy

## 2023-08-17 NOTE — PROGRESS NOTES
[facility]   Acute Rehabilitation Occupational Therapy Daily Treatment Note    Date: 23  Patient Name: Reyes Finch       Room: 48/1417-37  MRN: [id number]  Account: [de-identified]   : 1950  (68 y.o.) Gender: female       Referring Practitioner: Dr. Maricruz Hyatt  Diagnosis: s/p ACDF C6-C7 and C2-T2 fusion (23)       Treatment Diagnosis: Impaired self-care status    Past Medical History:  has a past medical history of Allergic rhinitis, cause unspecified, Back pain, Bowel obstruction (HCC), C. difficile diarrhea, CAD (coronary artery disease), Cardiac murmur, Cellulitis, Cerebral artery occlusion with cerebral infarction ([address]), COVID-19, Diverticulosis of colon (without mention of hemorrhage), GERD (gastroesophageal reflux disease), History of blood transfusion, History of CHF (congestive heart failure), History of MI (myocardial infarction), History of ovarian cyst, History of peritonitis, HTN (hypertension), Hx of blood clots, Hyperlipidemia, Intestinal or peritoneal adhesions with obstruction (postoperative) (postinfection) ([address]), Kidney infection, Lateral epicondylitis  of elbow, MDRO (multiple drug resistant organisms) resistance, Muscle strain, Other abnormal glucose, PONV (postoperative nausea and vomiting), Pre-diabetes, Restless legs syndrome (RLS), Snores, 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[address]), TIA (transient ischemic attack), Under care of service provider, Under care of service provider, Uses walker, Vitamin D deficiency, Wears glasses, and Wellness examination. Past Surgical History:   has a past surgical history that includes Ovary removal (); colectomy (); Appendectomy (); Ovary removal (); Hysterectomy (); Bunionectomy (Left); sinus surgery (); Colonoscopy; other surgical history (2014); cyst removal (Right); Wrist fracture surgery (Left, 2019); Upper gastrointestinal endoscopy (N/A, 2019);  Upper gastrointestinal

## 2023-08-17 NOTE — PLAN OF CARE
Problem: Discharge Planning  Goal: Discharge to home or other facility with appropriate resources  Outcome: Progressing     Problem: Skin/Tissue Integrity  Goal: Absence of new skin breakdown  Description: 1. Monitor for areas of redness and/or skin breakdown  2. Assess vascular access sites hourly  3. Every 4-6 hours minimum:  Change oxygen saturation probe site  4. Every 4-6 hours:  If on nasal continuous positive airway pressure, respiratory therapy assess nares and determine need for appliance change or resting period.   Outcome: Progressing     Problem: Safety - Adult  Goal: Free from fall injury  Outcome: Progressing  Flowsheets (Taken 8/16/2023 3107)  Free From Fall Injury: Instruct family/caregiver on patient safety     Problem: ABCDS Injury Assessment  Goal: Absence of physical injury  Outcome: Progressing     Problem: Chronic Conditions and Co-morbidities  Goal: Patient's chronic conditions and co-morbidity symptoms are monitored and maintained or improved  Outcome: Progressing     Problem: Nutrition Deficit:  Goal: Optimize nutritional status  8/17/2023 0438 by Stephane Moon RN  Outcome: Progressing     Problem: Pain  Goal: Verbalizes/displays adequate comfort level or baseline comfort level  8/17/2023 0438 by Stephane Moon RN  Outcome: Progressing

## 2023-08-18 VITALS
HEART RATE: 67 BPM | BODY MASS INDEX: 33.66 KG/M2 | WEIGHT: 190 LBS | SYSTOLIC BLOOD PRESSURE: 159 MMHG | TEMPERATURE: 98.8 F | HEIGHT: 63 IN | OXYGEN SATURATION: 93 % | RESPIRATION RATE: 16 BRPM | DIASTOLIC BLOOD PRESSURE: 72 MMHG

## 2023-08-18 PROCEDURE — 99238 HOSP IP/OBS DSCHRG MGMT 30/<: CPT | Performed by: PHYSICAL MEDICINE & REHABILITATION

## 2023-08-18 PROCEDURE — 97110 THERAPEUTIC EXERCISES: CPT

## 2023-08-18 PROCEDURE — 6370000000 HC RX 637 (ALT 250 FOR IP): Performed by: NURSE PRACTITIONER

## 2023-08-18 PROCEDURE — 97530 THERAPEUTIC ACTIVITIES: CPT

## 2023-08-18 PROCEDURE — 6370000000 HC RX 637 (ALT 250 FOR IP): Performed by: STUDENT IN AN ORGANIZED HEALTH CARE EDUCATION/TRAINING PROGRAM

## 2023-08-18 PROCEDURE — 6370000000 HC RX 637 (ALT 250 FOR IP): Performed by: INTERNAL MEDICINE

## 2023-08-18 PROCEDURE — 97535 SELF CARE MNGMENT TRAINING: CPT

## 2023-08-18 RX ADMIN — ACETAMINOPHEN 650 MG: 325 TABLET ORAL at 11:04

## 2023-08-18 RX ADMIN — LANSOPRAZOLE 15 MG: 15 TABLET, ORALLY DISINTEGRATING, DELAYED RELEASE ORAL at 08:49

## 2023-08-18 RX ADMIN — OXYCODONE HYDROCHLORIDE 10 MG: 10 TABLET ORAL at 07:24

## 2023-08-18 RX ADMIN — BUSPIRONE HYDROCHLORIDE 5 MG: 10 TABLET ORAL at 08:52

## 2023-08-18 RX ADMIN — APIXABAN 5 MG: 5 TABLET, FILM COATED ORAL at 07:24

## 2023-08-18 RX ADMIN — OXYCODONE HYDROCHLORIDE 5 MG: 5 TABLET ORAL at 12:26

## 2023-08-18 RX ADMIN — CARVEDILOL 25 MG: 25 TABLET, FILM COATED ORAL at 06:11

## 2023-08-18 RX ADMIN — FUROSEMIDE 40 MG: 40 TABLET ORAL at 07:24

## 2023-08-18 RX ADMIN — AMLODIPINE BESYLATE 2.5 MG: 2.5 TABLET ORAL at 07:24

## 2023-08-18 RX ADMIN — GABAPENTIN 200 MG: 100 CAPSULE ORAL at 07:24

## 2023-08-18 RX ADMIN — DOCUSATE SODIUM 100 MG: 100 CAPSULE, LIQUID FILLED ORAL at 07:24

## 2023-08-18 RX ADMIN — CITALOPRAM HYDROBROMIDE 20 MG: 20 TABLET ORAL at 07:24

## 2023-08-18 ASSESSMENT — PAIN SCALES - GENERAL
PAINLEVEL_OUTOF10: 7

## 2023-08-18 ASSESSMENT — PAIN DESCRIPTION - LOCATION
LOCATION: NECK

## 2023-08-18 ASSESSMENT — PAIN DESCRIPTION - DESCRIPTORS
DESCRIPTORS: ACHING

## 2023-08-18 ASSESSMENT — PAIN SCALES - WONG BAKER
WONGBAKER_NUMERICALRESPONSE: 0

## 2023-08-18 ASSESSMENT — PAIN DESCRIPTION - ORIENTATION
ORIENTATION: MID

## 2023-08-18 NOTE — DISCHARGE SUMMARY
Physical Medicine & Rehabilitation  Discharge Summary     Patient Identification:  Vince West  : 1950  Admit date: 2023  Discharge date: 23   Attending provider: Ketty Moon MD      Discharging provider: Carlos Davidson MD    Primary care provider: Leelee Nagel MD     Discharge Diagnoses:   Cervical stenosis  Dysphagia  Anemia  CAD  CHF  HTN  Hyperlipidemia  Insomnia  GERD  Anxiety  Obesity    Discharge Functional Status:    Physical Therapy:  Bed Mobility:   Bed mobility  Rolling to Left: Moderate assistance  Rolling to Right: Maximum assistance  Supine to Sit: Moderate assistance (uprighting trunk)  Sit to Supine: Unable to assess (pt left sitting up in w/c at end of am and pm session)  Scooting: Maximal assistance (to EOB)  Bed Mobility Comments: up in [facility]         Transfers:   Transfers  Sit to Stand: Maximum Assistance, 2 Person Assistance (WC to RW . Pt tends to push posteriorly left quads . Cues and assist  for left  foot placement, scooting forward to edge of chair and trunk flexion  . Block L foot when going to stand, second person to stabilize WC. CGA -mod UE assist at grab bar)  Stand to Sit: Moderate Assistance (for trunk flexion and controlled descent)  Bed to Chair: Maximum assistance (no AD , 1 step VCs sequencing , assist anterior and left weight shift , increased time)  Stand Pivot Transfers: Moderate Assistance (at grab bar to/from cammode left UE advancement to WC arm rest during rotation near cammode)  Squat Pivot Transfers: Maximum Assistance, 2 Person Assistance (and SBA x 1 for safety, VCs for technique , sequencing , increased foot clearance)  Comment: Increased time to complete all transfers. Pt tends to push heavy posteriorly through L quad.  Cues for L foot positioning         Mobility:   Ambulation  WB Status:  (-)  Ambulation  Surface: Level tile  Device: Rolling Walker  Other Apparatus: Wheelchair follow (second person)  Assistance: Maximum assistance (anterior

## 2023-08-18 NOTE — CARE COORDINATION
[facility] Acute Inpatient Rehabilitation  Case Management Discharge Note    Discharge Disposition: SNF: [facility]    Discharge Transportation Method: [facility] ,  Time: 1 pm    IMM Letter Status: Patient/Responsible Party agreeable with discharge: Second Signature Obtained, Patient/Responsible Party offered four hours to make informed decision regarding appeal process - Completed Letter Placed in Patient [address]: Not Applicable    Outpatient Therapy Services: Not Applicable    DME:  N/A    Current reconciled medication list sent to subsequent provider via: [employer] Record      Patient Health Questionnaire-9 (PHQ-2 to 9)   Over the last 2 weeks, how often have you been bothered by any of the following problems? 1. Little Interest or pleasure in doing things? Never or 1 Day - Score 0    2. Feeling down, depressed or hopeless? 2-6 Days (Several Days) - Score 1    3. Trouble falling or staying asleep, or sleeping too much? Never or 1 Day - Score 0    4. Feeling tired or having little energy? Never or 1 Day - Score 0    5. Poor appetite or overeating? Never or 1 Day - Score 0    6. Feeling bad about yourself-or that you are a failure or have let yourself or your family down? Never or 1 Day - Score 0    7. Trouble concentrating on things, such as reading the newspaper or watching television? Never or 1 Day - Score 0    8. Moving or speaking so slowly that other people could have noticed? Or the opposite-being so fidgety or restless that you have been moving around a lot more than usual?  Never or 1 Day - Score 0    9. Thoughts that you would be better off dead or of hurting yourself in some way?    Never or 1 Day - Score 0    Total Score: 1  If score is above 15, Notify PM&R Physician    If you checked off any problems, how difficult have these problems made it for you to do your work, take care of things at home, or get along with other

## 2023-08-18 NOTE — PLAN OF CARE
Problem: Discharge Planning  Goal: Discharge to home or other facility with appropriate resources  8/18/2023 0235 by Abilio Capone LPN  Outcome: Progressing     Problem: Skin/Tissue Integrity  Goal: Absence of new skin breakdown  Description: 1. Monitor for areas of redness and/or skin breakdown  2. Assess vascular access sites hourly  3. Every 4-6 hours minimum:  Change oxygen saturation probe site  4. Every 4-6 hours:  If on nasal continuous positive airway pressure, respiratory therapy assess nares and determine need for appliance change or resting period.   8/18/2023 0235 by Abilio Capone LPN  Outcome: Progressing     Problem: Safety - Adult  Goal: Free from fall injury  8/18/2023 0235 by Abilio Capone LPN  Outcome: Progressing  Flowsheets (Taken 8/17/2023 2248 by Andie Robles, RN)  Free From Fall Injury: Instruct family/caregiver on patient safety     Problem: ABCDS Injury Assessment  Goal: Absence of physical injury  Outcome: Progressing  Flowsheets (Taken 8/17/2023 2248 by Andie Robles, RN)  Absence of Physical Injury: Implement safety measures based on patient assessment     Problem: Chronic Conditions and Co-morbidities  Goal: Patient's chronic conditions and co-morbidity symptoms are monitored and maintained or improved  Outcome: Progressing     Problem: Nutrition Deficit:  Goal: Optimize nutritional status  Outcome: Progressing     Problem: Pain  Goal: Verbalizes/displays adequate comfort level or baseline comfort level  Outcome: Progressing

## 2023-08-18 NOTE — PROGRESS NOTES
the past 5 days, how much of the time has pain made it hard for you to sleep at night? Rarely or not at all   Over the past 5 days, how often have you limited your participation in rehabilitation therapy sessions due to pain? Rarely or not at all   Over the past 5 days, how often have you limited your day-to-day activities (excluding rehabilitation therapy session)? Rarely or not at all     Special Treatments, Procedures, and Programs   Check all of the following treatments, procedures, and programs that apply on admission.     Cancer Treatments   Chemotherapy No   If Yes, Check All That Apply   []IV Chemotherapy    []Oral Chemotherapy    [] Chemotherapy    Radiation No   Respiratory Therapies   Oxygen Therapy No  If Yes, Check All That Apply   []Continuous   []Intermittent    []High-Concentration    Suctioning No  If Yes, Check All That Apply   []Scheduled   []As Needed   Tracheostomy Care No   Invasive Mechanical Ventilator   (Ventilator or Respirator) No  If Yes, Check All That Apply   [] Non-invasive Mechanical Ventilator   []BiPAP   []CPAP   Other   IV Medications No  If Yes, Check All That Apply   [] IV Vasoactive Medications   []IV Antibiotics   []IV Anticoagulation   [] IV Medications   Transfusions No   Dialysis No  If Yes, Check All That Apply   []Hemodialysis   [] Dialysis   IV Access No  If Yes, Check All That Apply   []Peripheral   []Midline   [] (PICC, tunneled, port)

## 2023-08-18 NOTE — PROGRESS NOTES
[facility]   Acute Rehabilitation Occupational Therapy Daily Treatment Note    Date: 23  Patient Name: Maricruz Garcia       Room: 2058/0524-94  MRN: [id number]  Account: [de-identified]   : 1950  (68 y.o.) Gender: female       Referring Practitioner: Dr. Moose Mace  Diagnosis: s/p ACDF C6-C7 and C2-T2 fusion (23)       Treatment Diagnosis: Impaired self-care status    Past Medical History:  has a past medical history of Allergic rhinitis, cause unspecified, Back pain, Bowel obstruction ([address]), C. difficile diarrhea, CAD (coronary artery disease), Cardiac murmur, Cellulitis, Cerebral artery occlusion with cerebral infarction ([address]), COVID-19, Diverticulosis of colon (without mention of hemorrhage), GERD (gastroesophageal reflux disease), History of blood transfusion, History of CHF (congestive heart failure), History of MI (myocardial infarction), History of ovarian cyst, History of peritonitis, HTN (hypertension), Hx of blood clots, Hyperlipidemia, Intestinal or peritoneal adhesions with obstruction (postoperative) (postinfection) ([address]), Kidney infection, Lateral epicondylitis  of elbow, MDRO (multiple drug resistant organisms) resistance, Muscle strain, Other abnormal glucose, PONV (postoperative nausea and vomiting), Pre-diabetes, Restless legs syndrome (RLS), Snores, 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[address]), TIA (transient ischemic attack), Under care of service provider, Under care of service provider, Uses walker, Vitamin D deficiency, Wears glasses, and Wellness examination. Past Surgical History:   has a past surgical history that includes Ovary removal (); colectomy (); Appendectomy (); Ovary removal (); Hysterectomy (); Bunionectomy (Left); sinus surgery (); Colonoscopy; other surgical history (2014); cyst removal (Right); Wrist fracture surgery (Left, 2019);  Upper gastrointestinal endoscopy (N/A, 2019);

## 2023-08-18 NOTE — DISCHARGE INSTR - COC
Continuity of Care Form    Patient Name: Aida Presley   :  1950  MRN:  [id number]    Admit date:  2023  Discharge date:  23    Code Status Order: Full Code   Advance Directives:     Admitting Physician:  Elisabeth Ibrahim MD  PCP: Sky Herzog MD    Discharging Nurse: Que Odom [facility]  Unit/Room#: 5476/4468-83  Discharging Unit Phone Number: [phone]    Emergency Contact:   Extended Emergency Contact Information  Primary Emergency Contact: Celestine Jc  Address: [address] [place] [address] [address] [place] [address], [address] [place] [address] [address] [place] [address] Phone: [phone]  Mobile Phone: [phone]  Relation: Spouse  Hearing or visual needs: None  Other needs: None  Preferred language: [place]   needed? No  Secondary Emergency Contact: [facility]  Home Phone: [phone]  Mobile Phone: [phone]  Relation: Child    Past Surgical History:  Past Surgical History:   Procedure Laterality Date    ABDOMEN SURGERY      benign tumor removed near remaining ovary, 1.5 pounds    APPENDECTOMY      appendix ruptured, developed peritonitis    BACK SURGERY      BUNIONECTOMY Left     along with calcium deposits removed    CARDIAC CATHETERIZATION      negative    CERVICAL FUSION N/A 2021    POSTERIOR C3-6 LAMINECTOMY, PARTIAL C7 LAMINECTOMY, FUSION C3-C6, SILVERCORD performed by Christo Deshpande DO at [address] [place] [address] N/A 2023    ANTERIOR CERVICAL DISCECTOMY, OSTEOTOMY, FUSION,  C6-7, CORRECTION OF DEFORMITY. performed by Christo Deshpande DO at [address] [place] [address] N/A 2023    POSTERIOR CERVICAL OSTEOTOMY C6-7, REMOVAL OF PREVIOUS HARDWARE, CERVICAL FUSION C2-T2, CORRECTION OF DEFORMITY.  performed by Christo Deshpande DO at [facility]    12 INCHES REMOVED D/T OBSTRUCTION    COLONOSCOPY      CYST REMOVAL Right     right facial    FINGER CLOSED REDUCTION Left 2022    CLOSED REDUCTION PINNING LEFT LITTLE FINGER performed by JARRELL Quintanilla

## 2023-08-18 NOTE — PROGRESS NOTES
Physical Therapy  Facility/Department: [facility] ACUTE REHAB  Rehabilitation Physical Therapy Progress Note     NAME: Rona Lundberg  : 1950 (76 y.o.)  MRN: [id number]  CODE STATUS: Full Code    Date of Service: 23        Additional Pertinent Hx: Rona Lundberg is a 68 y.o. RHD female admitted to [facility] on 2023. Tomferny Belt She was admitted for scheduled cervical decompression/fusion with hardware exchange. This was performed by Dr. Caroline Hogue 23 with ACDF C6-7, anterior osteotomy 6-7 with correction of deformity, removal of previous hardware and segmental fixation/fusion from C2-T2. She has been managed in neuro ICU post-operatively and was extubated 23. She was febrile overnight  with CXR showing R infrahilar opacity. She was started on Augmentin for 5 days pneumonia treatment. SLP evaluated and diagnosed dysphagia via swallow study . She reports fairly significant pain which is responding to medications. She is having some thickened secretions. Pt on easy to chew diet and thin liquids, no straws. Pt admitted to rehab unit on 23. Family / Caregiver Present: No  Referring Practitioner: Dr Darnell Parsons  Referral Date : 23  Diagnosis: Cervical spinal stenosis s/p ACDF C6-C7 and C2-T2 fusion (23)  General Comment  Comments: c-collar donned upon approach for skilled PT services    Restrictions:  Restrictions/Precautions: Surgical Protocols; Fall Risk  Required Braces or Orthoses  Cervical: c-collar  Position Activity Restriction  Other position/activity restrictions: HOB 30 degrees or higher; Pt never to be without pillow,  s/p ACDF C6-C7 and C2-T2 fusion (23). NO STRAWS. Per CHRISTOS Jaramillo (8/10/23) , patient may remove aspen collar for 15 min intervals- while eating and for hygiene. Pt may remove while sleeping or in bed. Pt is to maintain collar for 8 weeks.      SUBJECTIVE  Pain: denies         OBJECTIVE  Vision  Vision: Impaired  Vision Exceptions: Wears glasses at all times    Hearing  Hearing: Within functional limits    Cognition  Overall Cognitive Status: Exceptions  Arousal/Alertness: Appropriate responses to stimuli  Following Commands: Follows one step commands consistently  Attention Span: Difficulty dividing attention  Memory: Decreased short term memory  Safety Judgement: Decreased awareness of need for assistance;Decreased awareness of need for safety  Problem Solving: Decreased awareness of errors;Assistance required to generate solutions;Assistance required to implement solutions  Insights: Decreased awareness of deficits  Initiation: Requires cues for some  Sequencing: Requires cues for some      Sensation  Overall Sensation Status: WFL (Pt denies any numbness/tingling)    Functional Mobility  Bed mobility  Bed Mobility Comments: up in [facility]  Transfers  Sit to Stand: Maximum Assistance;2 Person Assistance (WC to RW . Pt tends to push posteriorly left quads . Cues and assist  for left  foot placement, scooting forward to edge of chair and trunk flexion  . Block L foot when going to stand, second person to stabilize WC. CGA -mod UE assist at grab bar)  Stand to Sit: Moderate Assistance (for trunk flexion and controlled descent)  Bed to Chair: Moderate assistance (no AD , 1 step VCs sequencing , assist anterior and left weight shift , increased time)  Comment: Increased time to complete all transfers. Pt tends to push heavy posteriorly through L quad. Cues for L foot positioning  Balance  Posture: Fair  Sitting - Static: Fair;-  Sitting - Dynamic: Fair;-  Standing - Static: Poor  Standing - Dynamic: Poor;-  Comments: standing balance assessedin // barsv and in natan gordillo, pt able to sit EOB unsupported with CGA initially requiring Jennifer posterior lean.     Environmental Mobility  Stairs/Curb  Stairs?: No  Wheelchair Activities  Propulsion: Yes  Propulsion 1  Propulsion: Manual  Level: Level Tile  Method: Giselle Galvez  Description/ Details: short slow UE strides  Distance: 5 ft

## 2023-08-18 NOTE — PLAN OF CARE
Problem: Discharge Planning  Goal: Discharge to home or other facility with appropriate resources  8/18/2023 1032 by Manuel Castro II, LPN  Outcome: Completed     Problem: Skin/Tissue Integrity  Goal: Absence of new skin breakdown  Description: 1. Monitor for areas of redness and/or skin breakdown  2. Assess vascular access sites hourly  3. Every 4-6 hours minimum:  Change oxygen saturation probe site  4. Every 4-6 hours:  If on nasal continuous positive airway pressure, respiratory therapy assess nares and determine need for appliance change or resting period.   8/18/2023 1032 by Latanya Whitaker LPN  Outcome: Completed     Problem: Safety - Adult  Goal: Free from fall injury  8/18/2023 1032 by Manuel Castro II, LPN  Outcome: Completed     Problem: ABCDS Injury Assessment  Goal: Absence of physical injury  8/18/2023 1032 by Manuel Castro II, LPN  Outcome: Completed     Problem: Chronic Conditions and Co-morbidities  Goal: Patient's chronic conditions and co-morbidity symptoms are monitored and maintained or improved  8/18/2023 1032 by Manuel Castro II, LPN  Outcome: Completed     Problem: Nutrition Deficit:  Goal: Optimize nutritional status  8/18/2023 1032 by Manuel Castro II, LPN  Outcome: Completed     Problem: Pain  Goal: Verbalizes/displays adequate comfort level or baseline comfort level  8/18/2023 1032 by Latanya Whitaker LPN  Outcome: Completed

## 2023-08-18 NOTE — PROGRESS NOTES
Report given to MEENU Foster from [facility]. CII (2) attached with discharge paperwork.  All questions answered, pick-up at 1pm.

## 2023-08-28 ENCOUNTER — HOSPITAL ENCOUNTER (OUTPATIENT)
Age: 73
Setting detail: SPECIMEN
Discharge: HOME | End: 2023-08-28

## 2023-08-28 LAB
25(OH)D3 SERPL-MCNC: 28.4 NG/ML
ALBUMIN SERPL-MCNC: 3.6 G/DL (ref 3.5–5.2)
ALBUMIN/GLOB SERPL: 1.3 {RATIO} (ref 1–2.5)
ALP SERPL-CCNC: 62 U/L (ref 35–104)
ALT SERPL-CCNC: 10 U/L (ref 5–33)
ANION GAP SERPL CALCULATED.3IONS-SCNC: 13 MMOL/L (ref 9–17)
AST SERPL-CCNC: 13 U/L
BILIRUB SERPL-MCNC: 0.4 MG/DL (ref 0.3–1.2)
BNP SERPL-MCNC: 205 PG/ML
BUN SERPL-MCNC: 28 MG/DL (ref 8–23)
CALCIUM SERPL-MCNC: 8.6 MG/DL (ref 8.6–10.4)
CHLORIDE SERPL-SCNC: 100 MMOL/L (ref 98–107)
CHOLEST SERPL-MCNC: 199 MG/DL
CHOLESTEROL/HDL RATIO: 4.4
CO2 SERPL-SCNC: 27 MMOL/L (ref 20–31)
CREAT SERPL-MCNC: 0.9 MG/DL (ref 0.5–0.9)
ERYTHROCYTE [DISTWIDTH] IN BLOOD BY AUTOMATED COUNT: 13.4 % (ref 11.8–14.4)
EST. AVERAGE GLUCOSE BLD GHB EST-MCNC: 114 MG/DL
GFR SERPL CREATININE-BSD FRML MDRD: >60 ML/MIN/1.73M2
GLUCOSE SERPL-MCNC: 102 MG/DL (ref 70–99)
HBA1C MFR BLD: 5.6 % (ref 4–6)
HCT VFR BLD AUTO: 33.5 % (ref 36.3–47.1)
HDLC SERPL-MCNC: 45 MG/DL
HGB BLD-MCNC: 10.6 G/DL (ref 11.9–15.1)
LDLC SERPL CALC-MCNC: 127 MG/DL (ref 0–130)
MCH RBC QN AUTO: 31.3 PG (ref 25.2–33.5)
MCHC RBC AUTO-ENTMCNC: 31.6 G/DL (ref 28.4–34.8)
MCV RBC AUTO: 98.8 FL (ref 82.6–102.9)
NRBC BLD-RTO: 0 PER 100 WBC
PLATELET # BLD AUTO: 262 K/UL (ref 138–453)
PMV BLD AUTO: 9.6 FL (ref 8.1–13.5)
POTASSIUM SERPL-SCNC: 3.5 MMOL/L (ref 3.7–5.3)
PROT SERPL-MCNC: 6.4 G/DL (ref 6.4–8.3)
RBC # BLD AUTO: 3.39 M/UL (ref 3.95–5.11)
SODIUM SERPL-SCNC: 140 MMOL/L (ref 135–144)
TRIGL SERPL-MCNC: 137 MG/DL
VIT B12 SERPL-MCNC: 775 PG/ML (ref 232–1245)
WBC OTHER # BLD: 5.5 K/UL (ref 3.5–11.3)

## 2023-08-28 PROCEDURE — 80053 COMPREHEN METABOLIC PANEL: CPT

## 2023-08-28 PROCEDURE — 80061 LIPID PANEL: CPT

## 2023-08-28 PROCEDURE — 82607 VITAMIN B-12: CPT

## 2023-08-28 PROCEDURE — 83036 HEMOGLOBIN GLYCOSYLATED A1C: CPT

## 2023-08-28 PROCEDURE — 85027 COMPLETE CBC AUTOMATED: CPT

## 2023-08-28 PROCEDURE — P9603 ONE-WAY ALLOW PRORATED MILES: HCPCS

## 2023-08-28 PROCEDURE — 82306 VITAMIN D 25 HYDROXY: CPT

## 2023-08-28 PROCEDURE — 83880 ASSAY OF NATRIURETIC PEPTIDE: CPT

## 2023-08-28 PROCEDURE — 36415 COLL VENOUS BLD VENIPUNCTURE: CPT

## 2023-08-29 ENCOUNTER — HOSPITAL ENCOUNTER (OUTPATIENT)
Age: 73
Setting detail: SPECIMEN
Discharge: HOME | End: 2023-08-29

## 2023-08-29 LAB
25(OH)D3 SERPL-MCNC: 26.1 NG/ML
ALBUMIN SERPL-MCNC: 3.5 G/DL (ref 3.5–5.2)
ALBUMIN/GLOB SERPL: 1.2 {RATIO} (ref 1–2.5)
ALP SERPL-CCNC: 58 U/L (ref 35–104)
ALT SERPL-CCNC: 10 U/L (ref 5–33)
ANION GAP SERPL CALCULATED.3IONS-SCNC: 13 MMOL/L (ref 9–17)
AST SERPL-CCNC: 13 U/L
BILIRUB SERPL-MCNC: 0.3 MG/DL (ref 0.3–1.2)
BNP SERPL-MCNC: 174 PG/ML
BUN SERPL-MCNC: 24 MG/DL (ref 8–23)
CALCIUM SERPL-MCNC: 8.6 MG/DL (ref 8.6–10.4)
CHLORIDE SERPL-SCNC: 103 MMOL/L (ref 98–107)
CHOLEST SERPL-MCNC: 199 MG/DL
CHOLESTEROL/HDL RATIO: 4.7
CO2 SERPL-SCNC: 27 MMOL/L (ref 20–31)
CREAT SERPL-MCNC: 0.9 MG/DL (ref 0.5–0.9)
ERYTHROCYTE [DISTWIDTH] IN BLOOD BY AUTOMATED COUNT: 13.7 % (ref 11.8–14.4)
EST. AVERAGE GLUCOSE BLD GHB EST-MCNC: 117 MG/DL
GFR SERPL CREATININE-BSD FRML MDRD: >60 ML/MIN/1.73M2
GLUCOSE SERPL-MCNC: 106 MG/DL (ref 70–99)
HBA1C MFR BLD: 5.7 % (ref 4–6)
HCT VFR BLD AUTO: 34.1 % (ref 36.3–47.1)
HDLC SERPL-MCNC: 42 MG/DL
HGB BLD-MCNC: 10.6 G/DL (ref 11.9–15.1)
LDLC SERPL CALC-MCNC: 129 MG/DL (ref 0–130)
MCH RBC QN AUTO: 30.9 PG (ref 25.2–33.5)
MCHC RBC AUTO-ENTMCNC: 31.1 G/DL (ref 28.4–34.8)
MCV RBC AUTO: 99.4 FL (ref 82.6–102.9)
NRBC BLD-RTO: 0 PER 100 WBC
PLATELET # BLD AUTO: 254 K/UL (ref 138–453)
PMV BLD AUTO: 9.9 FL (ref 8.1–13.5)
POTASSIUM SERPL-SCNC: 3.3 MMOL/L (ref 3.7–5.3)
PROT SERPL-MCNC: 6.4 G/DL (ref 6.4–8.3)
RBC # BLD AUTO: 3.43 M/UL (ref 3.95–5.11)
SODIUM SERPL-SCNC: 143 MMOL/L (ref 135–144)
TRIGL SERPL-MCNC: 139 MG/DL
VIT B12 SERPL-MCNC: 742 PG/ML (ref 232–1245)
WBC OTHER # BLD: 5.3 K/UL (ref 3.5–11.3)

## 2023-08-29 PROCEDURE — 80061 LIPID PANEL: CPT

## 2023-08-29 PROCEDURE — 36415 COLL VENOUS BLD VENIPUNCTURE: CPT

## 2023-08-29 PROCEDURE — P9603 ONE-WAY ALLOW PRORATED MILES: HCPCS

## 2023-08-29 PROCEDURE — 83880 ASSAY OF NATRIURETIC PEPTIDE: CPT

## 2023-08-29 PROCEDURE — 80053 COMPREHEN METABOLIC PANEL: CPT

## 2023-08-29 PROCEDURE — 82306 VITAMIN D 25 HYDROXY: CPT

## 2023-08-29 PROCEDURE — 85027 COMPLETE CBC AUTOMATED: CPT

## 2023-08-29 PROCEDURE — 83036 HEMOGLOBIN GLYCOSYLATED A1C: CPT

## 2023-08-29 PROCEDURE — 82607 VITAMIN B-12: CPT

## 2023-08-31 ENCOUNTER — HOSPITAL ENCOUNTER (OUTPATIENT)
Age: 73
Setting detail: SPECIMEN
Discharge: HOME | End: 2023-08-31

## 2023-08-31 LAB
ANION GAP SERPL CALCULATED.3IONS-SCNC: 13 MMOL/L (ref 9–17)
BUN SERPL-MCNC: 18 MG/DL (ref 8–23)
CALCIUM SERPL-MCNC: 8.8 MG/DL (ref 8.6–10.4)
CHLORIDE SERPL-SCNC: 104 MMOL/L (ref 98–107)
CO2 SERPL-SCNC: 23 MMOL/L (ref 20–31)
CREAT SERPL-MCNC: 0.7 MG/DL (ref 0.5–0.9)
ERYTHROCYTE [DISTWIDTH] IN BLOOD BY AUTOMATED COUNT: 13.6 % (ref 11.8–14.4)
GFR SERPL CREATININE-BSD FRML MDRD: >60 ML/MIN/1.73M2
GLUCOSE SERPL-MCNC: 125 MG/DL (ref 70–99)
HCT VFR BLD AUTO: 35.8 % (ref 36.3–47.1)
HGB BLD-MCNC: 11.2 G/DL (ref 11.9–15.1)
MCH RBC QN AUTO: 30.4 PG (ref 25.2–33.5)
MCHC RBC AUTO-ENTMCNC: 31.3 G/DL (ref 28.4–34.8)
MCV RBC AUTO: 97.3 FL (ref 82.6–102.9)
NRBC BLD-RTO: 0 PER 100 WBC
PLATELET # BLD AUTO: 257 K/UL (ref 138–453)
PMV BLD AUTO: 9.5 FL (ref 8.1–13.5)
POTASSIUM SERPL-SCNC: 3.7 MMOL/L (ref 3.7–5.3)
RBC # BLD AUTO: 3.68 M/UL (ref 3.95–5.11)
SODIUM SERPL-SCNC: 140 MMOL/L (ref 135–144)
WBC OTHER # BLD: 5.2 K/UL (ref 3.5–11.3)

## 2023-08-31 PROCEDURE — 85027 COMPLETE CBC AUTOMATED: CPT

## 2023-08-31 PROCEDURE — 36415 COLL VENOUS BLD VENIPUNCTURE: CPT

## 2023-08-31 PROCEDURE — 80048 BASIC METABOLIC PNL TOTAL CA: CPT

## 2023-08-31 PROCEDURE — P9603 ONE-WAY ALLOW PRORATED MILES: HCPCS

## 2023-09-05 ENCOUNTER — CARE COORDINATION (OUTPATIENT)
Dept: CARE COORDINATION | Age: 73
End: 2023-09-05

## 2023-09-05 DIAGNOSIS — I50.33 ACUTE ON CHRONIC DIASTOLIC (CONGESTIVE) HEART FAILURE (HCC): ICD-10-CM

## 2023-09-05 DIAGNOSIS — I10 HYPERTENSION, UNSPECIFIED TYPE: Primary | ICD-10-CM

## 2023-09-05 NOTE — CARE COORDINATION
CCSS placed call to patient to arrange RPM kit  through [facility]. Unable to reach pt, and a voicemail message was left regarding information below. Reviewed with patient how to pack equipment in original packing. Verified patient's availability to schedule UPS  time. UPS  time requested.  Anticipated  date range 2-4 business days

## 2023-09-05 NOTE — PROGRESS NOTES
Remote Patient Order Discontinued    Received request from Madyson Gibson RN  to discontinue order for remote patient monitoring of CHF and HTN and order completed.

## 2023-09-14 ENCOUNTER — HOSPITAL ENCOUNTER (OUTPATIENT)
Age: 73
Setting detail: SPECIMEN
Discharge: HOME | End: 2023-09-14
Payer: MEDICARE

## 2023-09-14 LAB
ANION GAP SERPL CALCULATED.3IONS-SCNC: 13 MMOL/L (ref 9–17)
BUN SERPL-MCNC: 30 MG/DL (ref 8–23)
CALCIUM SERPL-MCNC: 8.3 MG/DL (ref 8.6–10.4)
CHLORIDE SERPL-SCNC: 104 MMOL/L (ref 98–107)
CO2 SERPL-SCNC: 25 MMOL/L (ref 20–31)
CREAT SERPL-MCNC: 0.9 MG/DL (ref 0.5–0.9)
ERYTHROCYTE [DISTWIDTH] IN BLOOD BY AUTOMATED COUNT: 13.6 % (ref 11.8–14.4)
GFR SERPL CREATININE-BSD FRML MDRD: >60 ML/MIN/1.73M2
GLUCOSE SERPL-MCNC: 107 MG/DL (ref 70–99)
HCT VFR BLD AUTO: 36.1 % (ref 36.3–47.1)
HGB BLD-MCNC: 11.4 G/DL (ref 11.9–15.1)
MCH RBC QN AUTO: 31 PG (ref 25.2–33.5)
MCHC RBC AUTO-ENTMCNC: 31.6 G/DL (ref 28.4–34.8)
MCV RBC AUTO: 98.1 FL (ref 82.6–102.9)
NRBC BLD-RTO: 0 PER 100 WBC
PLATELET # BLD AUTO: 229 K/UL (ref 138–453)
PMV BLD AUTO: 9.9 FL (ref 8.1–13.5)
POTASSIUM SERPL-SCNC: 3.6 MMOL/L (ref 3.7–5.3)
RBC # BLD AUTO: 3.68 M/UL (ref 3.95–5.11)
SODIUM SERPL-SCNC: 142 MMOL/L (ref 135–144)
WBC OTHER # BLD: 5.5 K/UL (ref 3.5–11.3)

## 2023-09-14 PROCEDURE — P9603 ONE-WAY ALLOW PRORATED MILES: HCPCS

## 2023-09-14 PROCEDURE — 36415 COLL VENOUS BLD VENIPUNCTURE: CPT

## 2023-09-14 PROCEDURE — 85027 COMPLETE CBC AUTOMATED: CPT

## 2023-09-14 PROCEDURE — 80048 BASIC METABOLIC PNL TOTAL CA: CPT

## 2023-09-15 ENCOUNTER — HOSPITAL ENCOUNTER (OUTPATIENT)
Age: 73
Setting detail: SPECIMEN
Discharge: HOME | End: 2023-09-15

## 2023-09-18 ENCOUNTER — HOSPITAL ENCOUNTER (OUTPATIENT)
Age: 73
Setting detail: SPECIMEN
Discharge: HOME | End: 2023-09-18
Payer: MEDICARE

## 2023-09-18 LAB
ANION GAP SERPL CALCULATED.3IONS-SCNC: 15 MMOL/L (ref 9–17)
BUN SERPL-MCNC: 28 MG/DL (ref 8–23)
CALCIUM SERPL-MCNC: 8.7 MG/DL (ref 8.6–10.4)
CHLORIDE SERPL-SCNC: 102 MMOL/L (ref 98–107)
CO2 SERPL-SCNC: 25 MMOL/L (ref 20–31)
CREAT SERPL-MCNC: 1 MG/DL (ref 0.5–0.9)
ERYTHROCYTE [DISTWIDTH] IN BLOOD BY AUTOMATED COUNT: 13.7 % (ref 11.8–14.4)
GFR SERPL CREATININE-BSD FRML MDRD: 60 ML/MIN/1.73M2
GLUCOSE SERPL-MCNC: 95 MG/DL (ref 70–99)
HCT VFR BLD AUTO: 37.7 % (ref 36.3–47.1)
HGB BLD-MCNC: 11.7 G/DL (ref 11.9–15.1)
MCH RBC QN AUTO: 30.5 PG (ref 25.2–33.5)
MCHC RBC AUTO-ENTMCNC: 31 G/DL (ref 28.4–34.8)
MCV RBC AUTO: 98.4 FL (ref 82.6–102.9)
NRBC BLD-RTO: 0 PER 100 WBC
PLATELET # BLD AUTO: 226 K/UL (ref 138–453)
PMV BLD AUTO: 10.3 FL (ref 8.1–13.5)
POTASSIUM SERPL-SCNC: 3.6 MMOL/L (ref 3.7–5.3)
RBC # BLD AUTO: 3.83 M/UL (ref 3.95–5.11)
SODIUM SERPL-SCNC: 142 MMOL/L (ref 135–144)
WBC OTHER # BLD: 5.2 K/UL (ref 3.5–11.3)

## 2023-09-18 PROCEDURE — P9603 ONE-WAY ALLOW PRORATED MILES: HCPCS

## 2023-09-18 PROCEDURE — 85027 COMPLETE CBC AUTOMATED: CPT

## 2023-09-18 PROCEDURE — 36415 COLL VENOUS BLD VENIPUNCTURE: CPT

## 2023-09-18 PROCEDURE — 80048 BASIC METABOLIC PNL TOTAL CA: CPT

## 2023-09-22 ENCOUNTER — HOSPITAL ENCOUNTER (OUTPATIENT)
Age: 73
Setting detail: SPECIMEN
Discharge: HOME | End: 2023-09-22

## 2023-09-22 LAB — POTASSIUM SERPL-SCNC: 4 MMOL/L (ref 3.7–5.3)

## 2023-09-22 PROCEDURE — P9603 ONE-WAY ALLOW PRORATED MILES: HCPCS

## 2023-09-22 PROCEDURE — 84132 ASSAY OF SERUM POTASSIUM: CPT

## 2023-09-22 PROCEDURE — 36415 COLL VENOUS BLD VENIPUNCTURE: CPT

## 2023-09-26 ENCOUNTER — OFFICE VISIT (OUTPATIENT)
Dept: NEUROSURGERY | Age: 73
End: 2023-09-26
Payer: MEDICARE

## 2023-09-26 ENCOUNTER — HOSPITAL ENCOUNTER (OUTPATIENT)
Dept: GENERAL RADIOLOGY | Age: 73
Discharge: HOME | End: 2023-09-28
Payer: MEDICARE

## 2023-09-26 ENCOUNTER — HOSPITAL ENCOUNTER (OUTPATIENT)
Age: 73
Discharge: HOME | End: 2023-09-28
Payer: MEDICARE

## 2023-09-26 VITALS
WEIGHT: 190 LBS | SYSTOLIC BLOOD PRESSURE: 129 MMHG | BODY MASS INDEX: 33.66 KG/M2 | HEART RATE: 66 BPM | OXYGEN SATURATION: 92 % | HEIGHT: 63 IN | DIASTOLIC BLOOD PRESSURE: 60 MMHG

## 2023-09-26 DIAGNOSIS — M40.12 OTHER SECONDARY KYPHOSIS, CERVICAL REGION: ICD-10-CM

## 2023-09-26 DIAGNOSIS — M48.02 CERVICAL SPINAL STENOSIS DUE TO ADJACENT SEGMENT DISEASE AFTER FUSION PROCEDURE: ICD-10-CM

## 2023-09-26 DIAGNOSIS — Z98.1 S/P CERVICAL SPINAL FUSION: ICD-10-CM

## 2023-09-26 DIAGNOSIS — M50.30 CERVICAL SPINAL STENOSIS DUE TO ADJACENT SEGMENT DISEASE AFTER FUSION PROCEDURE: ICD-10-CM

## 2023-09-26 DIAGNOSIS — R26.81 GAIT INSTABILITY: ICD-10-CM

## 2023-09-26 DIAGNOSIS — Z98.1 S/P CERVICAL SPINAL FUSION: Primary | ICD-10-CM

## 2023-09-26 PROCEDURE — 1123F ACP DISCUSS/DSCN MKR DOCD: CPT | Performed by: NEUROLOGICAL SURGERY

## 2023-09-26 PROCEDURE — 99213 OFFICE O/P EST LOW 20 MIN: CPT | Performed by: NEUROLOGICAL SURGERY

## 2023-09-26 PROCEDURE — G8399 PT W/DXA RESULTS DOCUMENT: HCPCS | Performed by: NEUROLOGICAL SURGERY

## 2023-09-26 PROCEDURE — 3074F SYST BP LT 130 MM HG: CPT | Performed by: NEUROLOGICAL SURGERY

## 2023-09-26 PROCEDURE — 1090F PRES/ABSN URINE INCON ASSESS: CPT | Performed by: NEUROLOGICAL SURGERY

## 2023-09-26 PROCEDURE — G8427 DOCREV CUR MEDS BY ELIG CLIN: HCPCS | Performed by: NEUROLOGICAL SURGERY

## 2023-09-26 PROCEDURE — 3017F COLORECTAL CA SCREEN DOC REV: CPT | Performed by: NEUROLOGICAL SURGERY

## 2023-09-26 PROCEDURE — G8417 CALC BMI ABV UP PARAM F/U: HCPCS | Performed by: NEUROLOGICAL SURGERY

## 2023-09-26 PROCEDURE — 3078F DIAST BP <80 MM HG: CPT | Performed by: NEUROLOGICAL SURGERY

## 2023-09-26 PROCEDURE — 1036F TOBACCO NON-USER: CPT | Performed by: NEUROLOGICAL SURGERY

## 2023-09-26 PROCEDURE — 72050 X-RAY EXAM NECK SPINE 4/5VWS: CPT

## 2023-09-26 RX ORDER — CALCIUM CARBONATE/VITAMIN D3 600 MG-10
1 TABLET ORAL
COMMUNITY
Start: 2023-07-20

## 2023-09-26 RX ORDER — BISACODYL 5 MG/1
5 TABLET, DELAYED RELEASE ORAL DAILY PRN
COMMUNITY

## 2023-09-26 RX ORDER — POTASSIUM CHLORIDE 20 MEQ/1
TABLET, EXTENDED RELEASE ORAL
COMMUNITY
Start: 2023-09-17

## 2023-09-26 RX ORDER — AMLODIPINE BESYLATE 5 MG/1
TABLET ORAL
COMMUNITY
Start: 2023-09-10

## 2023-09-26 RX ORDER — OXYCODONE HYDROCHLORIDE 5 MG/1
TABLET ORAL
COMMUNITY
Start: 2023-09-25

## 2023-09-26 NOTE — PROGRESS NOTES
as cause of accidental injury in home as place of occurrence, initial encounter    Cellulitis    Chronic anticoagulation    Acute on chronic diastolic (congestive) heart failure (HCC)    History of DVT (deep vein thrombosis)    Prediabetes    History of CVA with residual deficit    Cervical myopathy    Hypervolemia    Congenital kyphosis of cervical region    Status post surgery    Cervical spondylosis    Azotemia    Spinal stenosis in cervical region    Other secondary kyphosis, cervical region         A/P:  This is a 68 y.o. female with S/P cervical spinal fusion  Gait instability  Other secondary kyphosis, cervical region   2 month postop correction of cervical thoracic kyphosis. Doing well, no pain, chin on neck resolved and posturing maintained  Keep collar for 1 more month   Need bone growth stimulator  We will follow up in 3 months and obtain a cervical XR    By signing my name below, I, Katina Schmitz, attest that this documentation has been prepared under the direction and in the presence of Halima Cables, DO. Electronically signed: Wil Lobo, 9/26/23     This note was created using voice recognition software. There may be inaccuracies of transcription  that are inadvertently overlooked prior to the signature. There is any questions about the transcription please contact me.

## 2023-09-27 NOTE — PATIENT CARE CONFERENCE
[address]    ACUTE REHABILITATION  TEAM CONFERENCE NOTE  Date: 3/29/22  Patient Name: Brie Gonzalez       Room: 5496/7086-73  MRN: [id number]       : 1950  (71 y.o.)     Gender: female   Referring Practitioner: Dr Lorena Valverde. Cervical myelopathy ([facility]) [G95.9]  Diagnosis: Cervical myelopathy     NURSING  Bladder  Always Continent  Bowel   Always Continent  Date of Last BM: 3/28  Intervention    Both Bowel & Bladder Program     Wounds/Incisions/Ulcers: Reddened and dried scabs on BLE; Redness buttocks  Medication Education Program: Patient currently unable to manage medications and family being educated  Pain: Patient's pain is currently controlled with -  Ultram 5o mg q 6 hrs prn. Fall Risk:  Falling star program initiated    PHYSICAL THERAPY  Bed mobility  Rolling to Left: Minimal assistance  Rolling to Right: Minimal assistance  Supine to Sit: Minimal assistance  Sit to Supine: Maximum assistance  Scooting: Stand by assistance  Comment: performed on mat with wedge and 3 pillows; complained of increased chest pain with supine to sit transfer    Transfers:  Sit to Stand: Contact guard assistance;Minimal Assistance (Marilyn from bed, CGA from chair)  Stand to sit: Contact guard assistance;Minimal Assistance (Marilyn from bed, CGA from chair)  Bed to Chair: Contact guard assistance    Ambulation 1  Surface: level tile  Device: Rolling Walker  Other Apparatus: Wheelchair follow  Assistance: Contact guard assistance  Quality of Gait: slow alicia with step to pattern, flat foot LE at contact, decreased stance on L LE; downward gaze and cervical flexion  Gait Deviations: Decreased step length;Decreased step height;Shuffles; Slow Alicia  Distance: 92 ft, 60 ft  Comments: pt needed to sit before getting back to her starting point    # Steps : 10  Stairs Height:  (4\"/6\")  Rails: Bilateral  Device: No Device  Assistance: Contact guard assistance  Comment: step-to pattern              Other: Pt has lift chair, rolling walker, and a Rollator at home. Pt presents with frequent falls at home, has generalized weakness, and debility, but L LE weaker thant R LE. Pt with decreased balance, decreased motor planning and weakness contributing to her falls. Pt with recent R LE DVT and R LE isswollen at this time. Pt is high fall risk at this time     Goals  Time Frame for Short term goals: 1 week  Short term goal 1: Pt to demostrate bed mobility CGA/Jennifer. Short term goal 2: Pt to perform transfers CGA. Short term goal 3: Pt to amb 100'-150' with device, CGA. Short term goal 4: Pt to improve BLE strength by 1/2 MMG. Short term goal 5: Pt to improve standing balance to [facility]. Short term goal 6: Pt able to perform 4\" and 6\" steps x3 in //bars or acute stair way, with 2 B UE support, min A      OCCUPATIONAL THERAPY  SELF CARE   Equipment Provided: Reacher,Long-handled sponge (Dysem)  Eating            Setup; Increased time to complete (pt reports diffulty keeping food on utensils )   Oral Hygiene            Setup (seated in wheelchair at sink )   Shower/Bathe Self             UE Bathing: Supervision  LE Bathing: Supervision   Upper Body Dressing            Stand by assistance   Lower Body Dressing            Putting On/Taking Off Footwear             Stand by assistance   Toilet Transfer             Toilet - Technique: Stand pivot  Equipment Used: Grab bars  Toilet Transfer: Minimal assistance  Toilet Transfers Comments: VC's for hand placement/technique    Toileting Hygiene            Minimal assistance; Increased time to complete (A with LB clothing mgmt)    Bed mobility  Sit to Supine: Maximum assistance  Comment: Max A to return to bed for BLE management due to increased pain     Shower Transfers: Minimal assistance  Balance  Sitting Balance: Supervision  Standing Balance: Contact guard assistance  Standing Balance  Time: ~1-2 minutes  Activity: transfers/self care  Comment: 1-2 UE support     Equipment Recommendations  Equipment Needed:  (TBD)       Short term goals  Time Frame for Short term goals: 1 week   Short term goal 1: Pt will complete LB dressing/bathing/toileting CGA with Good safety with use of AE/DME/modified techniques for increased IND with self care  Short term goal 2: Pt will participate in 30+ minutes functional activity for increased strength/balance/endurance for increased IND in ADL's  Short term goal 3: Pt will complete transfers/functional mobility CGA with Good safety and RW for increased IND in ADL's  Short term goal 4: Pt will verbalize/demonstrate Good understanding of AE/DME/modified techniques for increased IND in self care  Short term goal 5: Pt will complete UB bathing/dressing/grooming with Supervision for increased IND in self care      SPEECH THERAPY    Orientation Log (O-Log) Score: Not Applicable - Not in use as part of patient care plan  Cognitive Log (Cog-Log) Score: Not Applicable - Not using as part of patient care plan  Short Term Goal:     NUTRITION  Weight: 180 lb (81.6 kg) / Body mass index is 31.89 kg/m². Diet Rx: 4 Carbohydrate Choices per Meal. PO intake appears adequate. Ref. Range 3/28/2022 07:01   GLUCOSE, FASTING,GF Latest Ref Range: 70 - 99 mg/dL 107 (H)   Please see nutrition note for details.     SOCIAL WORK ASSESSMENT  Assessment:   Pre-Admission Status:  Lives With: Spouse  Type of Home: House ([place])  Home Layout: One level,Laundry in basement (Pt does not go down to basement, daughter does laundry)  Home Access: Stairs to enter without rails  Entrance Stairs - Number of Steps: 1  Bathroom Shower/Tub: Tub/Shower unit,Curtain,Shower chair with back  Bathroom Toilet: Standard  Bathroom Equipment: Shower chair,Grab bars in shower,Hand-held shower,Toilet raiser,Grab bars around toilet  Bathroom Accessibility: Walker accessible  Home Equipment: Hospital bed,Rolling walker,4 wheeled walker,Cane,Lift chair,Grab bars,Reacher,Sock aid (pt states she needs new sock aid)  Receives Help From: Family  ADL Assistance: Needs assistance (daughter A with shower t/f 2x/week, IND dressing/toileting)  Homemaking Assistance: Needs assistance (daughter does laundry, pt helps with meals)  Homemaking Responsibilities: Yes ( grocery shops)  Ambulation Assistance: Independent (uses RW)  Transfer Assistance: Needs assistance ( lift chair, A with stairs/transfers to shower)  Active : No  Patient's  Info: family  Occupation: Retired  Type of occupation: hairdresser  IADL Comments: sleeps in lift chair  Additional Comments:  is retired and assists prn - hx of CABG. Daughter works full time - assists with pt 2x/week on medications, laundry, etc. Lives close by. Family Education: Need to make contact with family to initiate education    Percentage Risk for Readmission: Low 0 - 18%   Risk of Unplanned Readmission:  18 %    Critical Items: None       Problem / Barrier Intervention / Plan  Results   Impaired function related to debility, B LE weakness, L LE > R LE, multiple falls at home Strengthening, functional mobility training with assistive device. Altered ability to care for self  Training in use of DME/AE and modified care strategies to support self care performance                                 Total Self Care Score    Total Mobility Score  Admission Score:  23      Admission Score:  29  Goal:  41/42         Goal:  62/90   `  Discharge Plan   Estimated Discharge Date: 4/6/22  Home evaluation needed?  Home Evaluation Indication (NO, Requires ReEval, YES/Date): No home evaluation need indicated for patient at this time  Overnight or Day Pass: No  Factors facilitating achievement of predicted outcomes: Family support, Cooperative and Pleasant  Barriers to the achievement of predicted outcomes: Pain, Anxiety, Decreased endurance, Medical complications, Stairs at home, Skin Care and Medication managment    Functional Goals at discharge:  Predicted Outcome: Home with familyPATIENT'S LEVEL OF ASSISTANCE: Contact Guard / Touching Assistance  Discharge therapy goals:  PT: Long term goals  Time Frame for Long term goals : By DC  Long term goal 1: Pt able to perform bed mobility at SBA  Long term goal 2: Pt able to transfer at supervsion level. Long term goal 3:  Pt able to ambulate house hold distances with assistive device mod-I  Long term goal 4:  Pt able to ambulate distance of 150 ft, level surfaces and shorter distance outside terraine at SBA. Long term goal 5: Pt able to propel w/c on level surface, distance of 150 ft, supervsion level. Long term goal 6: Improve 2MWT distance to atleast 120 ft to improve overall fucntion. Long term goal 7: Pt to improve Tinetti balance score to atlest 20/28 to reduce fall risk.    Long term goal 8: Pt donny to perform cub step with B UE support and/or donny to goup and down 4 to 5 steps with 2 rails at min A   OT:Long term goals  Time Frame for Long term goals : by discharge   Long term goal 1: Pt will complete toileting/grooming/dressing Mod I and bathing with Supervison with Good safety with use of AE/DME/modified techniques for increased IND with self care  Long term goal 2: Pt will complete functional mobility/transferes during functional activity Mod I with Good safety and RW for increased IND in ADL's  Long term goal 3: Pt will verbalize/demonstrate Good understanding of fall prevention strategies for increased safety/IND in self care  Long term goal 4: Pt will improve L hand strength for self care as evidence by a 3# improvement in dynomometor testing   Long term goal 5: Pt will improve L FMC as evidence by a 20 second improvement on 9 hole peg test for increased IND with self care  ST:     Participating Team Members:  /:  HIPOLITO Brewster   Occupational Therapist: Jw Baig OT   Physical Therapist: Aric Johnson PT  Speech Therapist:  N/A  Nurse: Lance Armstrong RN Dietary/Nutrition: Selena Land RD, LD  Pastoral Care: Rahat Garcia  CMG: Jas Nazario RN    I approve the established interdisciplinary plan of care as documented within the medical record of [facility].  Fredy Epps MD Socially

## 2023-09-29 NOTE — PROGRESS NOTES
[facility]   Acute Inpatient Rehab Preadmission Assessment    Patient Name: nAa Byrd        MRN: [id number]    : 1950  (71 y.o.)  Gender: female     Admitted from:   [address] [place] [address]  [][facility]   [][facility]   [][facility]   []Outside Admission - Location:                                 [x]Initial         []Updated    Date of Onset / Admission to the acute hospital:  3/16/22    Inpatient Rehabilitation Admitting Diagnosis:  Cervical Myelopathy      Did patient have surgery/procedures? [x]No  []Yes:      Physicians: Radha Cueva for clinical complications: Moderate    Co-morbidities:      1. History of Decompression with residual left-sided weakness  2. Spondylolisthesis  3. Chronic DVT  4. Chronic diastolic heart failure  5. Hypertension  6. Coronary disease  7. Hyperlipidemia  8. Questional history of CVA  9. Major depressive disorder  10. History of cervical spine fusion 2021  11.  lumbar fusion 2020 and 2020  12. Anemia  13. Lower extremity cellulitis  14. Pain  15. Restless Leg Syndrome  16. Sternal pain  17.  Acute kidney injury      Financial Information  Primary insurance:  []Medicare [x] Medicare HMO  []Commercial insurance    []Medicaid   [] Medicaid HMO []Workers Compensation   []Personal Pay    Secondary Insurance:  []Medicare [] Medicare HMO  [x]Commercial insurance    []Medicaid  []Medicaid HMO  []Workers Compensation []None    Precautions:   []Cardiac Precautions:    []Total hip precautions:    []Weight Bearing status:  [x]Safety Precautions/Concerns:  Fall Risk, General Precautions, Metal implants: cervical and lumbar surgeries, L wrist  [x]Visually impaired:  Wears glasses for reading   []Hard of Hearing:     Isolation Precautions:         []Yes  [x]No  If Yes:  [] Droplet  []Contact []Airborne     []VRE     []MRSA     []C-diff         [] TB       [] ESBL [] MDRO          [] Other:        Physiatrist:  [x]   Dr. Anitra Winter     []   Dr. Vangie Dolan  []   Dr. Tamika Buckner  [] Dr. Sonia Lowery    Patients Occupation: Retired    Reviewed Lab and Diagnostic reports from Current Admission: Yes    Patients Prior Functional  Level: Prior Function  Receives Help From: Family  ADL Assistance: Needs assistance (daughter A with bathing 2x/week, IND dressing/toileting)  Homemaking Assistance: Needs assistance (daughter does laundry, pt helps with meals)  Ambulation Assistance: Independent (uses RW)  Transfer Assistance: Needs assistance ( lift chair, A with stairs/transfers to shower)  Additional Comments:  is retired and assists prn - hx of CABG. Daughter works full time - assists with pt 2x/week on medications, laundry, etc. Lives close by. History of current illness, per PM&R Consult:  31-year-old female with history anemia, spondylolisthesis, chronic DVT, chronic diastolic heart failure, CVA, major depressive disorder, status post cervical spine fusion, stenosis cervical spine with myelopathy and diabetes type 2 who presented with leg pain and shortness of breath and fall as well as neck pain she is on multiple falls recently. .  She had bilateral lower lobe edema has known history of DVT is currently on Eliquis.     Internal medicine-awaiting placement, neurology consulted, MRI showed T2-3 and T5 7 moderate neural foraminal narrowing Lasix for swelling     Neurology-frequent falling episodes, cervical myelopathy status post decompression with residual left-sided weakness-recommended rehab     Neurosurgery last seen 3/4-ataxia left hand loss dexterity and falls new due to Parkinson's or other movement disorder had MRI done thoracic spine follow-up in 2 months    Prognosis: Fair    Current functional status for upper extremity ADLs:  UE Bathing: Stand by assistance  UE Dressing: Stand by assistance    Current functional status for lower extremity ADLs:  LE Bathing: Moderate assistance (reports assist B lower legs/feet)  LE Dressing:  Moderate assistance    Current functional status [x]     [] Recreational Therapy, as appropriate    [x] Nutrition    [] Dialysis  [] Cultural Needs Identified  [] Special Equipment Needs  [] Other    Rehab Justification:  Needs 3 hrs therapy per day or 15 hours per week:  Yes  Identified Rehab Nursing needs: Yes  Intense Interdisciplinary need:  Yes  Need for 24 hr physician supervision:  Yes  Measurable improved quality of life:  Yes  Willingness to participate:  Yes  Medical Necessity:  Yes  Patient able to tolerate care proposed:  Yes    Expected Discharge Destination/Functional Level:  Home with assist  Expected length of time to achieve that level of improvement: 1-2 weeks  Expected Post Discharge Treatments: Home with possible Home Care    Other information relevant to patient's care needs:  N/A    Acute Inpatient Rehabilitation Disclosure Statement will be provided to patient upon admission to ARU with patient's verbalization of understanding. I have reviewed and concur with the findings and results of the pre-admission screening assessment completed by the Inpatient Rehabilitation Admissions Coordinator. Fluconazole Counseling:  Patient counseled regarding adverse effects of fluconazole including but not limited to headache, diarrhea, nausea, upset stomach, liver function test abnormalities, taste disturbance, and stomach pain.  There is a rare possibility of liver failure that can occur when taking fluconazole.  The patient understands that monitoring of LFTs and kidney function test may be required, especially at baseline. The patient verbalized understanding of the proper use and possible adverse effects of fluconazole.  All of the patient's questions and concerns were addressed.

## 2023-11-02 ENCOUNTER — HOSPITAL ENCOUNTER (OUTPATIENT)
Age: 73
Setting detail: SPECIMEN
Discharge: HOME | End: 2023-11-02
Payer: MEDICARE

## 2023-11-02 LAB
CRP SERPL HS-MCNC: 6.9 MG/L (ref 0–5)
ERYTHROCYTE [SEDIMENTATION RATE] IN BLOOD BY PHOTOMETRIC METHOD: 25 MM/HR (ref 0–30)
RHEUMATOID FACT SER NEPH-ACNC: <10 IU/ML

## 2023-11-02 PROCEDURE — P9603 ONE-WAY ALLOW PRORATED MILES: HCPCS

## 2023-11-02 PROCEDURE — 36415 COLL VENOUS BLD VENIPUNCTURE: CPT

## 2023-11-02 PROCEDURE — 86140 C-REACTIVE PROTEIN: CPT

## 2023-11-02 PROCEDURE — 85652 RBC SED RATE AUTOMATED: CPT

## 2023-11-02 PROCEDURE — 86431 RHEUMATOID FACTOR QUANT: CPT

## 2023-11-17 ENCOUNTER — HOSPITAL ENCOUNTER (OUTPATIENT)
Age: 73
Setting detail: SPECIMEN
Discharge: HOME | End: 2023-11-17

## 2023-11-17 LAB
25(OH)D3 SERPL-MCNC: 26.4 NG/ML (ref 30–100)
ALBUMIN SERPL-MCNC: 3.6 G/DL (ref 3.5–5.2)
ALBUMIN/GLOB SERPL: 1 {RATIO} (ref 1–2.5)
ALP SERPL-CCNC: 63 U/L (ref 35–104)
ALT SERPL-CCNC: 9 U/L (ref 10–35)
ANION GAP SERPL CALCULATED.3IONS-SCNC: 14 MMOL/L (ref 9–16)
AST SERPL-CCNC: 17 U/L (ref 10–35)
BILIRUB SERPL-MCNC: 0.2 MG/DL (ref 0–1.2)
BUN SERPL-MCNC: 55 MG/DL (ref 8–23)
CALCIUM SERPL-MCNC: 8.7 MG/DL (ref 8.6–10.4)
CHLORIDE SERPL-SCNC: 108 MMOL/L (ref 98–107)
CHOLEST SERPL-MCNC: 206 MG/DL (ref 0–199)
CHOLESTEROL/HDL RATIO: 5
CO2 SERPL-SCNC: 24 MMOL/L (ref 20–31)
CREAT SERPL-MCNC: 1.6 MG/DL (ref 0.5–0.9)
ERYTHROCYTE [DISTWIDTH] IN BLOOD BY AUTOMATED COUNT: 13.1 % (ref 11.8–14.4)
EST. AVERAGE GLUCOSE BLD GHB EST-MCNC: 134 MG/DL
GFR SERPL CREATININE-BSD FRML MDRD: 34 ML/MIN/1.73M2
GLUCOSE SERPL-MCNC: 108 MG/DL (ref 74–99)
HBA1C MFR BLD: 6.3 % (ref 4–6)
HCT VFR BLD AUTO: 35.9 % (ref 36.3–47.1)
HDLC SERPL-MCNC: 38 MG/DL
HGB BLD-MCNC: 11.5 G/DL (ref 11.9–15.1)
LDLC SERPL CALC-MCNC: 131 MG/DL (ref 0–100)
MCH RBC QN AUTO: 30.3 PG (ref 25.2–33.5)
MCHC RBC AUTO-ENTMCNC: 32 G/DL (ref 28.4–34.8)
MCV RBC AUTO: 94.7 FL (ref 82.6–102.9)
NRBC BLD-RTO: 0 PER 100 WBC
PLATELET # BLD AUTO: 243 K/UL (ref 138–453)
PMV BLD AUTO: 9.8 FL (ref 8.1–13.5)
POTASSIUM SERPL-SCNC: 4.2 MMOL/L (ref 3.7–5.3)
PROT SERPL-MCNC: 6.5 G/DL (ref 6.6–8.7)
RBC # BLD AUTO: 3.79 M/UL (ref 3.95–5.11)
SODIUM SERPL-SCNC: 146 MMOL/L (ref 136–145)
TRIGL SERPL-MCNC: 187 MG/DL (ref 0–149)
VLDLC SERPL CALC-MCNC: 37 MG/DL
WBC OTHER # BLD: 6.1 K/UL (ref 3.5–11.3)

## 2023-11-17 PROCEDURE — 83036 HEMOGLOBIN GLYCOSYLATED A1C: CPT

## 2023-11-17 PROCEDURE — 82306 VITAMIN D 25 HYDROXY: CPT

## 2023-11-17 PROCEDURE — 80061 LIPID PANEL: CPT

## 2023-11-17 PROCEDURE — 85027 COMPLETE CBC AUTOMATED: CPT

## 2023-11-17 PROCEDURE — 80053 COMPREHEN METABOLIC PANEL: CPT

## 2023-11-17 PROCEDURE — P9603 ONE-WAY ALLOW PRORATED MILES: HCPCS

## 2023-11-17 PROCEDURE — 36415 COLL VENOUS BLD VENIPUNCTURE: CPT

## 2023-11-20 ENCOUNTER — HOSPITAL ENCOUNTER (OUTPATIENT)
Age: 73
Setting detail: SPECIMEN
Discharge: HOME | End: 2023-11-20
Payer: MEDICARE

## 2023-11-20 LAB
ANION GAP SERPL CALCULATED.3IONS-SCNC: 11 MMOL/L (ref 9–17)
BNP SERPL-MCNC: 231 PG/ML
BUN SERPL-MCNC: 29 MG/DL (ref 8–23)
CALCIUM SERPL-MCNC: 8.6 MG/DL (ref 8.6–10.4)
CHLORIDE SERPL-SCNC: 111 MMOL/L (ref 98–107)
CO2 SERPL-SCNC: 24 MMOL/L (ref 20–31)
CREAT SERPL-MCNC: 0.9 MG/DL (ref 0.5–0.9)
GFR SERPL CREATININE-BSD FRML MDRD: >60 ML/MIN/1.73M2
GLUCOSE SERPL-MCNC: 100 MG/DL (ref 70–99)
POTASSIUM SERPL-SCNC: 3.8 MMOL/L (ref 3.7–5.3)
SODIUM SERPL-SCNC: 146 MMOL/L (ref 135–144)

## 2023-11-20 PROCEDURE — 36415 COLL VENOUS BLD VENIPUNCTURE: CPT

## 2023-11-20 PROCEDURE — 83880 ASSAY OF NATRIURETIC PEPTIDE: CPT

## 2023-11-20 PROCEDURE — P9603 ONE-WAY ALLOW PRORATED MILES: HCPCS

## 2023-11-20 PROCEDURE — 80048 BASIC METABOLIC PNL TOTAL CA: CPT

## 2023-11-21 ENCOUNTER — HOSPITAL ENCOUNTER (OUTPATIENT)
Age: 73
Setting detail: SPECIMEN
Discharge: HOME | End: 2023-11-21
Payer: MEDICARE

## 2023-11-21 LAB
ANION GAP SERPL CALCULATED.3IONS-SCNC: 10 MMOL/L (ref 9–17)
BUN SERPL-MCNC: 25 MG/DL (ref 8–23)
CALCIUM SERPL-MCNC: 8.6 MG/DL (ref 8.6–10.4)
CHLORIDE SERPL-SCNC: 112 MMOL/L (ref 98–107)
CO2 SERPL-SCNC: 24 MMOL/L (ref 20–31)
CREAT SERPL-MCNC: 1 MG/DL (ref 0.5–0.9)
GFR SERPL CREATININE-BSD FRML MDRD: 60 ML/MIN/1.73M2
GLUCOSE SERPL-MCNC: 109 MG/DL (ref 70–99)
POTASSIUM SERPL-SCNC: 3.4 MMOL/L (ref 3.7–5.3)
SODIUM SERPL-SCNC: 146 MMOL/L (ref 135–144)

## 2023-11-21 PROCEDURE — P9603 ONE-WAY ALLOW PRORATED MILES: HCPCS

## 2023-11-21 PROCEDURE — 80048 BASIC METABOLIC PNL TOTAL CA: CPT

## 2023-11-21 PROCEDURE — 36415 COLL VENOUS BLD VENIPUNCTURE: CPT

## 2023-11-28 ENCOUNTER — HOSPITAL ENCOUNTER (OUTPATIENT)
Age: 73
Setting detail: SPECIMEN
Discharge: HOME | End: 2023-11-28
Payer: MEDICARE

## 2023-11-28 LAB
ANION GAP SERPL CALCULATED.3IONS-SCNC: 9 MMOL/L (ref 9–17)
BUN SERPL-MCNC: 19 MG/DL (ref 8–23)
CALCIUM SERPL-MCNC: 8.8 MG/DL (ref 8.6–10.4)
CHLORIDE SERPL-SCNC: 108 MMOL/L (ref 98–107)
CO2 SERPL-SCNC: 23 MMOL/L (ref 20–31)
CREAT SERPL-MCNC: 0.8 MG/DL (ref 0.5–0.9)
ERYTHROCYTE [DISTWIDTH] IN BLOOD BY AUTOMATED COUNT: 13.5 % (ref 11.8–14.4)
GFR SERPL CREATININE-BSD FRML MDRD: >60 ML/MIN/1.73M2
GLUCOSE SERPL-MCNC: 110 MG/DL (ref 70–99)
HCT VFR BLD AUTO: 37.1 % (ref 36.3–47.1)
HGB BLD-MCNC: 12.2 G/DL (ref 11.9–15.1)
MCH RBC QN AUTO: 31 PG (ref 25.2–33.5)
MCHC RBC AUTO-ENTMCNC: 32.9 G/DL (ref 28.4–34.8)
MCV RBC AUTO: 94.2 FL (ref 82.6–102.9)
NRBC BLD-RTO: 0 PER 100 WBC
PLATELET # BLD AUTO: 282 K/UL (ref 138–453)
PMV BLD AUTO: 9.8 FL (ref 8.1–13.5)
POTASSIUM SERPL-SCNC: 3.8 MMOL/L (ref 3.7–5.3)
RBC # BLD AUTO: 3.94 M/UL (ref 3.95–5.11)
SODIUM SERPL-SCNC: 140 MMOL/L (ref 135–144)
WBC OTHER # BLD: 10.2 K/UL (ref 3.5–11.3)

## 2023-11-28 PROCEDURE — 36415 COLL VENOUS BLD VENIPUNCTURE: CPT

## 2023-11-28 PROCEDURE — P9603 ONE-WAY ALLOW PRORATED MILES: HCPCS

## 2023-11-28 PROCEDURE — 83880 ASSAY OF NATRIURETIC PEPTIDE: CPT

## 2023-11-28 PROCEDURE — 85027 COMPLETE CBC AUTOMATED: CPT

## 2023-11-28 PROCEDURE — 80048 BASIC METABOLIC PNL TOTAL CA: CPT

## 2023-11-29 LAB — BNP SERPL-MCNC: 273 PG/ML

## 2023-12-04 ENCOUNTER — HOSPITAL ENCOUNTER (OUTPATIENT)
Age: 73
Setting detail: SPECIMEN
Discharge: HOME | End: 2023-12-04
Payer: MEDICARE

## 2023-12-04 PROCEDURE — P9603 ONE-WAY ALLOW PRORATED MILES: HCPCS

## 2023-12-04 PROCEDURE — 36415 COLL VENOUS BLD VENIPUNCTURE: CPT

## 2023-12-05 ENCOUNTER — HOSPITAL ENCOUNTER (OUTPATIENT)
Age: 73
Setting detail: SPECIMEN
Discharge: HOME | End: 2023-12-05
Payer: MEDICARE

## 2023-12-05 LAB
ANION GAP SERPL CALCULATED.3IONS-SCNC: 16 MMOL/L (ref 9–17)
BUN SERPL-MCNC: 20 MG/DL (ref 8–23)
CALCIUM SERPL-MCNC: 8.4 MG/DL (ref 8.6–10.4)
CHLORIDE SERPL-SCNC: 108 MMOL/L (ref 98–107)
CO2 SERPL-SCNC: 22 MMOL/L (ref 20–31)
CREAT SERPL-MCNC: 0.7 MG/DL (ref 0.5–0.9)
GFR SERPL CREATININE-BSD FRML MDRD: >60 ML/MIN/1.73M2
GLUCOSE SERPL-MCNC: 83 MG/DL (ref 70–99)
POTASSIUM SERPL-SCNC: 3.9 MMOL/L (ref 3.7–5.3)
SODIUM SERPL-SCNC: 146 MMOL/L (ref 135–144)

## 2023-12-05 PROCEDURE — 80048 BASIC METABOLIC PNL TOTAL CA: CPT

## 2023-12-11 ENCOUNTER — HOSPITAL ENCOUNTER (OUTPATIENT)
Age: 73
Setting detail: SPECIMEN
Discharge: HOME | End: 2023-12-11
Payer: MEDICARE

## 2023-12-11 LAB
ANION GAP SERPL CALCULATED.3IONS-SCNC: 16 MMOL/L (ref 9–17)
BUN SERPL-MCNC: 22 MG/DL (ref 8–23)
CALCIUM SERPL-MCNC: 8.6 MG/DL (ref 8.6–10.4)
CHLORIDE SERPL-SCNC: 105 MMOL/L (ref 98–107)
CO2 SERPL-SCNC: 21 MMOL/L (ref 20–31)
CREAT SERPL-MCNC: 0.8 MG/DL (ref 0.5–0.9)
GFR SERPL CREATININE-BSD FRML MDRD: >60 ML/MIN/1.73M2
GLUCOSE SERPL-MCNC: 182 MG/DL (ref 70–99)
POTASSIUM SERPL-SCNC: 3.8 MMOL/L (ref 3.7–5.3)
SODIUM SERPL-SCNC: 142 MMOL/L (ref 135–144)

## 2023-12-11 PROCEDURE — P9603 ONE-WAY ALLOW PRORATED MILES: HCPCS

## 2023-12-11 PROCEDURE — 80048 BASIC METABOLIC PNL TOTAL CA: CPT

## 2023-12-11 PROCEDURE — 36415 COLL VENOUS BLD VENIPUNCTURE: CPT

## 2023-12-28 ENCOUNTER — TELEPHONE (OUTPATIENT)
Dept: NEUROSURGERY | Age: 73
End: 2023-12-28

## 2023-12-28 ENCOUNTER — HOSPITAL ENCOUNTER (OUTPATIENT)
Age: 73
Setting detail: SPECIMEN
Discharge: HOME | End: 2023-12-28
Payer: MEDICARE

## 2023-12-28 DIAGNOSIS — Z98.1 S/P CERVICAL SPINAL FUSION: Primary | ICD-10-CM

## 2023-12-28 LAB
ANION GAP SERPL CALCULATED.3IONS-SCNC: 14 MMOL/L (ref 9–17)
BUN SERPL-MCNC: 23 MG/DL (ref 8–23)
CALCIUM SERPL-MCNC: 8.8 MG/DL (ref 8.6–10.4)
CHLORIDE SERPL-SCNC: 101 MMOL/L (ref 98–107)
CO2 SERPL-SCNC: 23 MMOL/L (ref 20–31)
CREAT SERPL-MCNC: 0.9 MG/DL (ref 0.5–0.9)
ERYTHROCYTE [DISTWIDTH] IN BLOOD BY AUTOMATED COUNT: 13.8 % (ref 11.8–14.4)
GFR SERPL CREATININE-BSD FRML MDRD: >60 ML/MIN/1.73M2
GLUCOSE SERPL-MCNC: 109 MG/DL (ref 70–99)
HCT VFR BLD AUTO: 39.9 % (ref 36.3–47.1)
HGB BLD-MCNC: 12.1 G/DL (ref 11.9–15.1)
MCH RBC QN AUTO: 31.7 PG (ref 25.2–33.5)
MCHC RBC AUTO-ENTMCNC: 30.3 G/DL (ref 28.4–34.8)
MCV RBC AUTO: 104.5 FL (ref 82.6–102.9)
NRBC BLD-RTO: 0 PER 100 WBC
PLATELET # BLD AUTO: 235 K/UL (ref 138–453)
PMV BLD AUTO: 9.1 FL (ref 8.1–13.5)
POTASSIUM SERPL-SCNC: 4.4 MMOL/L (ref 3.7–5.3)
RBC # BLD AUTO: 3.82 M/UL (ref 3.95–5.11)
SODIUM SERPL-SCNC: 138 MMOL/L (ref 135–144)
WBC OTHER # BLD: 7.2 K/UL (ref 3.5–11.3)

## 2023-12-28 PROCEDURE — 80048 BASIC METABOLIC PNL TOTAL CA: CPT

## 2023-12-28 PROCEDURE — 85027 COMPLETE CBC AUTOMATED: CPT

## 2023-12-28 PROCEDURE — 36415 COLL VENOUS BLD VENIPUNCTURE: CPT

## 2023-12-28 PROCEDURE — P9603 ONE-WAY ALLOW PRORATED MILES: HCPCS

## 2023-12-28 NOTE — TELEPHONE ENCOUNTER
Patient advised: Please make sure patient a new xray right before next appointment per Dr. Cyril Goodpasture

## 2024-01-10 ENCOUNTER — HOSPITAL ENCOUNTER (OUTPATIENT)
Age: 74
Setting detail: SPECIMEN
Discharge: HOME | End: 2024-01-10
Payer: MEDICARE

## 2024-01-10 LAB
ANION GAP SERPL CALCULATED.3IONS-SCNC: 11 MMOL/L (ref 9–17)
BUN SERPL-MCNC: 36 MG/DL (ref 8–23)
CALCIUM SERPL-MCNC: 8.8 MG/DL (ref 8.6–10.4)
CHLORIDE SERPL-SCNC: 101 MMOL/L (ref 98–107)
CO2 SERPL-SCNC: 25 MMOL/L (ref 20–31)
CREAT SERPL-MCNC: 1.5 MG/DL (ref 0.5–0.9)
ERYTHROCYTE [DISTWIDTH] IN BLOOD BY AUTOMATED COUNT: 12.7 % (ref 11.8–14.4)
GFR SERPL CREATININE-BSD FRML MDRD: 37 ML/MIN/1.73M2
GLUCOSE SERPL-MCNC: 123 MG/DL (ref 70–99)
HCT VFR BLD AUTO: 32.6 % (ref 36.3–47.1)
HGB BLD-MCNC: 10.4 G/DL (ref 11.9–15.1)
MCH RBC QN AUTO: 31.7 PG (ref 25.2–33.5)
MCHC RBC AUTO-ENTMCNC: 31.9 G/DL (ref 28.4–34.8)
MCV RBC AUTO: 99.4 FL (ref 82.6–102.9)
NRBC BLD-RTO: 0 PER 100 WBC
PLATELET # BLD AUTO: 253 K/UL (ref 138–453)
PMV BLD AUTO: 10.2 FL (ref 8.1–13.5)
POTASSIUM SERPL-SCNC: 4.4 MMOL/L (ref 3.7–5.3)
RBC # BLD AUTO: 3.28 M/UL (ref 3.95–5.11)
SODIUM SERPL-SCNC: 137 MMOL/L (ref 135–144)
WBC OTHER # BLD: 5.5 K/UL (ref 3.5–11.3)

## 2024-01-10 PROCEDURE — 36415 COLL VENOUS BLD VENIPUNCTURE: CPT

## 2024-01-10 PROCEDURE — 85027 COMPLETE CBC AUTOMATED: CPT

## 2024-01-10 PROCEDURE — 80048 BASIC METABOLIC PNL TOTAL CA: CPT

## 2024-01-10 PROCEDURE — P9603 ONE-WAY ALLOW PRORATED MILES: HCPCS

## 2024-01-11 ENCOUNTER — HOSPITAL ENCOUNTER (OUTPATIENT)
Age: 74
Setting detail: SPECIMEN
Discharge: HOME | End: 2024-01-11

## 2024-01-11 LAB
ERYTHROCYTE [DISTWIDTH] IN BLOOD BY AUTOMATED COUNT: 12.8 % (ref 11.8–14.4)
HCT VFR BLD AUTO: 34.5 % (ref 36.3–47.1)
HGB BLD-MCNC: 11.1 G/DL (ref 11.9–15.1)
IRON SATN MFR SERPL: 26 % (ref 20–55)
IRON SERPL-MCNC: 93 UG/DL (ref 37–145)
MCH RBC QN AUTO: 32 PG (ref 25.2–33.5)
MCHC RBC AUTO-ENTMCNC: 32.2 G/DL (ref 28.4–34.8)
MCV RBC AUTO: 99.4 FL (ref 82.6–102.9)
NRBC BLD-RTO: 0 PER 100 WBC
PLATELET # BLD AUTO: 292 K/UL (ref 138–453)
PMV BLD AUTO: 9.9 FL (ref 8.1–13.5)
RBC # BLD AUTO: 3.47 M/UL (ref 3.95–5.11)
TIBC SERPL-MCNC: 360 UG/DL (ref 250–450)
TRANSFERRIN SERPL-MCNC: 318 MG/DL (ref 200–360)
UNSATURATED IRON BINDING CAPACITY: 267 UG/DL (ref 112–347)
WBC OTHER # BLD: 6.3 K/UL (ref 3.5–11.3)

## 2024-01-11 PROCEDURE — 84466 ASSAY OF TRANSFERRIN: CPT

## 2024-01-11 PROCEDURE — 85027 COMPLETE CBC AUTOMATED: CPT

## 2024-01-11 PROCEDURE — 83550 IRON BINDING TEST: CPT

## 2024-01-11 PROCEDURE — 83540 ASSAY OF IRON: CPT

## 2024-01-11 PROCEDURE — 36415 COLL VENOUS BLD VENIPUNCTURE: CPT

## 2024-01-11 PROCEDURE — P9603 ONE-WAY ALLOW PRORATED MILES: HCPCS

## 2024-01-12 ENCOUNTER — HOSPITAL ENCOUNTER (OUTPATIENT)
Age: 74
Setting detail: SPECIMEN
Discharge: HOME | End: 2024-01-12

## 2024-01-12 LAB
ANION GAP SERPL CALCULATED.3IONS-SCNC: 12 MMOL/L (ref 9–17)
BUN SERPL-MCNC: 27 MG/DL (ref 8–23)
CALCIUM SERPL-MCNC: 8.8 MG/DL (ref 8.6–10.4)
CHLORIDE SERPL-SCNC: 105 MMOL/L (ref 98–107)
CO2 SERPL-SCNC: 23 MMOL/L (ref 20–31)
CREAT SERPL-MCNC: 1 MG/DL (ref 0.5–0.9)
GFR SERPL CREATININE-BSD FRML MDRD: 60 ML/MIN/1.73M2
GLUCOSE SERPL-MCNC: 114 MG/DL (ref 70–99)
POTASSIUM SERPL-SCNC: 4.2 MMOL/L (ref 3.7–5.3)
SODIUM SERPL-SCNC: 140 MMOL/L (ref 135–144)

## 2024-01-12 PROCEDURE — 80048 BASIC METABOLIC PNL TOTAL CA: CPT

## 2024-01-12 PROCEDURE — P9603 ONE-WAY ALLOW PRORATED MILES: HCPCS

## 2024-01-12 PROCEDURE — 36415 COLL VENOUS BLD VENIPUNCTURE: CPT

## 2024-01-14 ENCOUNTER — HOSPITAL ENCOUNTER (OUTPATIENT)
Age: 74
Setting detail: SPECIMEN
Discharge: HOME | End: 2024-01-14
Payer: MEDICARE

## 2024-01-14 PROCEDURE — 82270 OCCULT BLOOD FECES: CPT

## 2024-01-15 LAB
DATE, STOOL #1: NORMAL
HEMOCCULT SP1 STL QL: NEGATIVE
TIME, STOOL #1: 1346

## 2024-01-30 ENCOUNTER — HOSPITAL ENCOUNTER (OUTPATIENT)
Dept: GENERAL RADIOLOGY | Age: 74
Discharge: HOME | End: 2024-02-01
Payer: MEDICARE

## 2024-01-30 ENCOUNTER — OFFICE VISIT (OUTPATIENT)
Dept: NEUROSURGERY | Age: 74
End: 2024-01-30
Payer: MEDICARE

## 2024-01-30 ENCOUNTER — HOSPITAL ENCOUNTER (OUTPATIENT)
Age: 74
Discharge: HOME | End: 2024-02-01
Payer: MEDICARE

## 2024-01-30 VITALS
HEIGHT: 63 IN | HEART RATE: 62 BPM | BODY MASS INDEX: 33.66 KG/M2 | SYSTOLIC BLOOD PRESSURE: 107 MMHG | DIASTOLIC BLOOD PRESSURE: 64 MMHG

## 2024-01-30 DIAGNOSIS — R26.81 GAIT INSTABILITY: ICD-10-CM

## 2024-01-30 DIAGNOSIS — Z98.1 S/P CERVICAL SPINAL FUSION: ICD-10-CM

## 2024-01-30 DIAGNOSIS — R27.0 ATAXIA: ICD-10-CM

## 2024-01-30 DIAGNOSIS — Z98.1 S/P CERVICAL SPINAL FUSION: Primary | ICD-10-CM

## 2024-01-30 PROBLEM — J44.9 CHRONIC OBSTRUCTIVE PULMONARY DISEASE, UNSPECIFIED (HCC): Status: ACTIVE | Noted: 2024-01-30

## 2024-01-30 PROCEDURE — 3017F COLORECTAL CA SCREEN DOC REV: CPT | Performed by: NEUROLOGICAL SURGERY

## 2024-01-30 PROCEDURE — 99214 OFFICE O/P EST MOD 30 MIN: CPT | Performed by: NEUROLOGICAL SURGERY

## 2024-01-30 PROCEDURE — G8484 FLU IMMUNIZE NO ADMIN: HCPCS | Performed by: NEUROLOGICAL SURGERY

## 2024-01-30 PROCEDURE — 72050 X-RAY EXAM NECK SPINE 4/5VWS: CPT

## 2024-01-30 PROCEDURE — G8427 DOCREV CUR MEDS BY ELIG CLIN: HCPCS | Performed by: NEUROLOGICAL SURGERY

## 2024-01-30 PROCEDURE — 3074F SYST BP LT 130 MM HG: CPT | Performed by: NEUROLOGICAL SURGERY

## 2024-01-30 PROCEDURE — G8399 PT W/DXA RESULTS DOCUMENT: HCPCS | Performed by: NEUROLOGICAL SURGERY

## 2024-01-30 PROCEDURE — 1090F PRES/ABSN URINE INCON ASSESS: CPT | Performed by: NEUROLOGICAL SURGERY

## 2024-01-30 PROCEDURE — 3078F DIAST BP <80 MM HG: CPT | Performed by: NEUROLOGICAL SURGERY

## 2024-01-30 PROCEDURE — 1123F ACP DISCUSS/DSCN MKR DOCD: CPT | Performed by: NEUROLOGICAL SURGERY

## 2024-01-30 PROCEDURE — G8417 CALC BMI ABV UP PARAM F/U: HCPCS | Performed by: NEUROLOGICAL SURGERY

## 2024-01-30 PROCEDURE — 1036F TOBACCO NON-USER: CPT | Performed by: NEUROLOGICAL SURGERY

## 2024-01-30 RX ORDER — BACLOFEN 10 MG/1
10 TABLET ORAL 2 TIMES DAILY
Qty: 90 TABLET | Refills: 2 | Status: SHIPPED | OUTPATIENT
Start: 2024-01-30

## 2024-01-30 RX ORDER — HYDROXYZINE HYDROCHLORIDE 25 MG/1
25 TABLET, FILM COATED ORAL EVERY 8 HOURS PRN
Qty: 30 TABLET | Refills: 1 | Status: SHIPPED | OUTPATIENT
Start: 2024-01-30 | End: 2024-02-19

## 2024-01-30 RX ORDER — HYDRALAZINE HYDROCHLORIDE 50 MG/1
50 TABLET, FILM COATED ORAL 3 TIMES DAILY
COMMUNITY

## 2024-01-30 RX ORDER — LISINOPRIL 10 MG/1
10 TABLET ORAL DAILY
COMMUNITY

## 2024-01-30 RX ORDER — ATORVASTATIN CALCIUM 40 MG/1
40 TABLET, FILM COATED ORAL DAILY
COMMUNITY

## 2024-01-30 RX ORDER — SERTRALINE HYDROCHLORIDE 25 MG/1
25 TABLET, FILM COATED ORAL 3 TIMES DAILY
COMMUNITY

## 2024-01-30 RX ORDER — LIDOCAINE 40 MG/G
CREAM TOPICAL PRN
COMMUNITY

## 2024-01-30 NOTE — PROGRESS NOTES
Department of Neurosurgery                                                      Follow up visit      History Obtained From:  patient, spouse    CHIEF COMPLAINT:         Chief Complaint   Patient presents with    Follow-up       HISTORY OF PRESENT ILLNESS:       The patient is a 74 y.o. female who presents for follow up for S/P cervical spinal fusion (07/26/2023), gait instability, and other secondary kyphosis of the cervical region    Patient presents to the office today utilizing a wheelchair to assist with ambulation. Patient is currently staying at a facility called [facility] and has been doing so for 6 months. There are no plans for the patient to return home since her  is no longer able to provide her adequate care.    Patient states that she has had a chronic cough since a year ago which she says has gotten worse since her fusion surgery. She also notes that she cannot reach behind her using her left arm and complains that it also has decreased mobility. She states that arm mobility has been like this for a year. Patient reports that she would like medicine to treat itching that she experiences between the webs of her fingers. She also complains of transient spasms in the left side of her neck which causes her pain. She denies taking any muscle relaxants to treat this pain. Otherwise the patient makes no note of complications from her cervical fusion and admits to decreased pain and her  notes improved cervical posture from the surgery.    Patient has been in physical therapy previously but is no longer in it. She is not seeing neurology or pain management at this time either. Before the patient was completely wheelchair bound she states that she had problems with very frequent falling. She no longer walks at all reportedly, not even with the assistance of a walker or cane.    PAST MEDICAL HISTORY :       Past Medical History:        Diagnosis Date    Allergic rhinitis, cause

## 2024-02-02 ENCOUNTER — HOSPITAL ENCOUNTER (OUTPATIENT)
Age: 74
Setting detail: SPECIMEN
Discharge: HOME | End: 2024-02-02

## 2024-02-02 LAB — 25(OH)D3 SERPL-MCNC: 28.4 NG/ML

## 2024-02-02 PROCEDURE — P9603 ONE-WAY ALLOW PRORATED MILES: HCPCS

## 2024-02-02 PROCEDURE — 82306 VITAMIN D 25 HYDROXY: CPT

## 2024-02-02 PROCEDURE — 36415 COLL VENOUS BLD VENIPUNCTURE: CPT

## 2024-02-06 NOTE — TELEPHONE ENCOUNTER
Aniyah Baca called she would like to be admitted to the [facility] in [facility]. Tanya would like to stay in patient to do therapy. She states they will give her therapy twice a day inpatient.  She feels like she is very weak and falls often her left foot drags.     -She will need an order for them to admit her.      Jo Sanz Fax [phone] [place] Detail Level: Detailed

## 2024-03-05 ENCOUNTER — TELEPHONE (OUTPATIENT)
Dept: INTERNAL MEDICINE CLINIC | Age: 74
End: 2024-03-05

## 2024-03-07 ENCOUNTER — TELEPHONE (OUTPATIENT)
Dept: INTERNAL MEDICINE CLINIC | Age: 74
End: 2024-03-07

## 2024-03-09 ENCOUNTER — APPOINTMENT (OUTPATIENT)
Dept: MRI IMAGING | Age: 74
End: 2024-03-09
Payer: MEDICARE

## 2024-03-09 ENCOUNTER — APPOINTMENT (OUTPATIENT)
Dept: GENERAL RADIOLOGY | Age: 74
End: 2024-03-09
Payer: MEDICARE

## 2024-03-09 ENCOUNTER — HOSPITAL ENCOUNTER (INPATIENT)
Age: 74
LOS: 6 days | Discharge: NURSING, REHAB OR LONG-TERM CARE | End: 2024-03-15
Attending: EMERGENCY MEDICINE
Payer: MEDICARE

## 2024-03-09 ENCOUNTER — APPOINTMENT (OUTPATIENT)
Dept: CT IMAGING | Age: 74
End: 2024-03-09
Payer: MEDICARE

## 2024-03-09 ENCOUNTER — APPOINTMENT (OUTPATIENT)
Dept: ULTRASOUND IMAGING | Age: 74
End: 2024-03-09
Payer: MEDICARE

## 2024-03-09 DIAGNOSIS — R47.01 APHASIA: ICD-10-CM

## 2024-03-09 DIAGNOSIS — N17.0 ACUTE RENAL FAILURE WITH TUBULAR NECROSIS (HCC): ICD-10-CM

## 2024-03-09 DIAGNOSIS — N17.9 AKI (ACUTE KIDNEY INJURY) (HCC): Primary | ICD-10-CM

## 2024-03-09 DIAGNOSIS — R29.90 STROKE-LIKE SYMPTOM: ICD-10-CM

## 2024-03-09 DIAGNOSIS — G93.40 ACUTE ENCEPHALOPATHY: ICD-10-CM

## 2024-03-09 PROBLEM — R53.1 LEFT-SIDED WEAKNESS: Status: ACTIVE | Noted: 2024-03-09

## 2024-03-09 PROBLEM — E87.5 HYPERKALEMIA: Status: ACTIVE | Noted: 2024-03-09

## 2024-03-09 LAB
ALBUMIN SERPL-MCNC: 3.9 G/DL (ref 3.5–5.2)
ALBUMIN/GLOB SERPL: 1.4 {RATIO} (ref 1–2.5)
ALP SERPL-CCNC: 39 U/L (ref 35–104)
ALT SERPL-CCNC: 10 U/L (ref 10–35)
AMMONIA PLAS-SCNC: 30 UMOL/L (ref 11–51)
ANION GAP SERPL CALCULATED.3IONS-SCNC: 12 MMOL/L (ref 9–16)
ANION GAP SERPL CALCULATED.3IONS-SCNC: 13 MMOL/L (ref 9–16)
ANION GAP SERPL CALCULATED.3IONS-SCNC: 15 MMOL/L (ref 9–16)
APAP SERPL-MCNC: <5 UG/ML (ref 10–30)
AST SERPL-CCNC: 14 U/L (ref 10–35)
BACTERIA URNS QL MICRO: ABNORMAL
BASOPHILS # BLD: 0.03 K/UL (ref 0–0.2)
BASOPHILS NFR BLD: 1 % (ref 0–2)
BILIRUB DIRECT SERPL-MCNC: <0.2 MG/DL (ref 0–0.3)
BILIRUB INDIRECT SERPL-MCNC: 0.2 MG/DL (ref 0–1)
BILIRUB SERPL-MCNC: 0.3 MG/DL (ref 0–1.2)
BILIRUB UR QL STRIP: NEGATIVE
BILIRUB UR QL STRIP: NEGATIVE
BUN BLD-MCNC: 80 MG/DL (ref 8–26)
BUN BLD-MCNC: 89 MG/DL (ref 8–26)
BUN SERPL-MCNC: 69 MG/DL (ref 8–23)
BUN SERPL-MCNC: 76 MG/DL (ref 8–23)
BUN SERPL-MCNC: 83 MG/DL (ref 8–23)
C3 SERPL-MCNC: 154 MG/DL (ref 90–180)
C4 SERPL-MCNC: 34 MG/DL (ref 10–40)
CA-I BLD-SCNC: 1.25 MMOL/L (ref 1.15–1.33)
CA-I BLD-SCNC: 1.27 MMOL/L (ref 1.15–1.33)
CALCIUM SERPL-MCNC: 8.8 MG/DL (ref 8.6–10.4)
CALCIUM SERPL-MCNC: 8.8 MG/DL (ref 8.6–10.4)
CALCIUM SERPL-MCNC: 9.2 MG/DL (ref 8.6–10.4)
CASTS #/AREA URNS LPF: ABNORMAL /LPF (ref 0–2)
CASTS #/AREA URNS LPF: ABNORMAL /LPF (ref 0–2)
CASTS #/AREA URNS LPF: ABNORMAL /LPF (ref 0–8)
CHLORIDE BLD-SCNC: 116 MMOL/L (ref 98–107)
CHLORIDE BLD-SCNC: 118 MMOL/L (ref 98–107)
CHLORIDE SERPL-SCNC: 110 MMOL/L (ref 98–107)
CHLORIDE SERPL-SCNC: 111 MMOL/L (ref 98–107)
CHLORIDE SERPL-SCNC: 113 MMOL/L (ref 98–107)
CHLORIDE UR-SCNC: 132 MMOL/L
CK SERPL-CCNC: 44 U/L (ref 26–192)
CLARITY UR: ABNORMAL
CLARITY UR: CLEAR
CO2 BLD CALC-SCNC: 18 MMOL/L (ref 22–30)
CO2 BLD CALC-SCNC: 20 MMOL/L (ref 22–30)
CO2 SERPL-SCNC: 14 MMOL/L (ref 20–31)
CO2 SERPL-SCNC: 17 MMOL/L (ref 20–31)
CO2 SERPL-SCNC: 19 MMOL/L (ref 20–31)
COLOR UR: YELLOW
COLOR UR: YELLOW
CREAT SERPL-MCNC: 2.2 MG/DL (ref 0.5–0.9)
CREAT SERPL-MCNC: 2.6 MG/DL (ref 0.5–0.9)
CREAT SERPL-MCNC: 3.4 MG/DL (ref 0.5–0.9)
CREAT UR-MCNC: 27 MG/DL (ref 28–217)
CRYSTALS URNS MICRO: ABNORMAL /HPF
CRYSTALS URNS MICRO: ABNORMAL /HPF
EGFR, POC: 11 ML/MIN/1.73M2
EGFR, POC: 15 ML/MIN/1.73M2
EOSINOPHIL # BLD: 0.29 K/UL (ref 0–0.44)
EOSINOPHILS RELATIVE PERCENT: 5 % (ref 1–4)
EPI CELLS #/AREA URNS HPF: ABNORMAL /HPF (ref 0–5)
EPI CELLS #/AREA URNS HPF: ABNORMAL /HPF (ref 0–5)
ERYTHROCYTE [DISTWIDTH] IN BLOOD BY AUTOMATED COUNT: 12.7 % (ref 11.8–14.4)
ETHANOL PERCENT: ABNORMAL %
ETHANOLAMINE SERPL-MCNC: <10 MG/DL
GFR SERPL CREATININE-BSD FRML MDRD: 14 ML/MIN/1.73M2
GFR SERPL CREATININE-BSD FRML MDRD: 19 ML/MIN/1.73M2
GFR SERPL CREATININE-BSD FRML MDRD: 23 ML/MIN/1.73M2
GLUCOSE BLD-MCNC: 109 MG/DL (ref 65–105)
GLUCOSE BLD-MCNC: 124 MG/DL (ref 65–105)
GLUCOSE BLD-MCNC: 141 MG/DL (ref 65–105)
GLUCOSE BLD-MCNC: 195 MG/DL (ref 65–105)
GLUCOSE BLD-MCNC: 84 MG/DL (ref 65–105)
GLUCOSE BLD-MCNC: 88 MG/DL (ref 74–100)
GLUCOSE BLD-MCNC: 95 MG/DL (ref 74–100)
GLUCOSE SERPL-MCNC: 235 MG/DL (ref 74–99)
GLUCOSE SERPL-MCNC: 86 MG/DL (ref 74–99)
GLUCOSE SERPL-MCNC: 97 MG/DL (ref 74–99)
GLUCOSE UR STRIP-MCNC: NEGATIVE MG/DL
GLUCOSE UR STRIP-MCNC: NEGATIVE MG/DL
HAV IGM SERPL QL IA: NONREACTIVE
HBV CORE IGM SERPL QL IA: NONREACTIVE
HBV SURFACE AG SERPL QL IA: NONREACTIVE
HCO3 VENOUS: 17.4 MMOL/L (ref 22–29)
HCO3 VENOUS: 19.8 MMOL/L (ref 22–29)
HCT VFR BLD AUTO: 30 % (ref 36–46)
HCT VFR BLD AUTO: 31 % (ref 36–46)
HCT VFR BLD AUTO: 32.1 % (ref 36.3–47.1)
HCV AB SERPL QL IA: NONREACTIVE
HGB BLD-MCNC: 10 G/DL (ref 11.9–15.1)
HGB UR QL STRIP.AUTO: ABNORMAL
HGB UR QL STRIP.AUTO: NEGATIVE
IMM GRANULOCYTES # BLD AUTO: 0.03 K/UL (ref 0–0.3)
IMM GRANULOCYTES NFR BLD: 1 %
INR PPP: 1.6
KETONES UR STRIP-MCNC: NEGATIVE MG/DL
KETONES UR STRIP-MCNC: NEGATIVE MG/DL
LACTIC ACID, WHOLE BLOOD: 0.7 MMOL/L (ref 0.7–2.1)
LEUKOCYTE ESTERASE UR QL STRIP: NEGATIVE
LEUKOCYTE ESTERASE UR QL STRIP: NEGATIVE
LYMPHOCYTES NFR BLD: 1.61 K/UL (ref 1.1–3.7)
LYMPHOCYTES RELATIVE PERCENT: 28 % (ref 24–43)
MCH RBC QN AUTO: 31.2 PG (ref 25.2–33.5)
MCHC RBC AUTO-ENTMCNC: 31.2 G/DL (ref 28.4–34.8)
MCV RBC AUTO: 100 FL (ref 82.6–102.9)
MONOCYTES NFR BLD: 0.48 K/UL (ref 0.1–1.2)
MONOCYTES NFR BLD: 8 % (ref 3–12)
MYOGLOBIN SERPL-MCNC: 85 NG/ML (ref 25–58)
NEGATIVE BASE EXCESS, VEN: 5.6 MMOL/L (ref 0–2)
NEGATIVE BASE EXCESS, VEN: 9.8 MMOL/L (ref 0–2)
NEUTROPHILS NFR BLD: 57 % (ref 36–65)
NEUTS SEG NFR BLD: 3.42 K/UL (ref 1.5–8.1)
NITRITE UR QL STRIP: NEGATIVE
NITRITE UR QL STRIP: NEGATIVE
NRBC BLD-RTO: 0 PER 100 WBC
O2 SAT, VEN: 50.1 % (ref 60–85)
O2 SAT, VEN: 67.9 % (ref 60–85)
PARTIAL THROMBOPLASTIN TIME: 35.8 SEC (ref 23–36.5)
PCO2, VEN: 37.5 MM HG (ref 41–51)
PCO2, VEN: 42.5 MM HG (ref 41–51)
PH UR STRIP: 5 [PH] (ref 5–8)
PH UR STRIP: 5 [PH] (ref 5–8)
PH VENOUS: 7.22 (ref 7.32–7.43)
PH VENOUS: 7.33 (ref 7.32–7.43)
PLATELET # BLD AUTO: 295 K/UL (ref 138–453)
PMV BLD AUTO: 9.7 FL (ref 8.1–13.5)
PO2, VEN: 32 MM HG (ref 30–50)
PO2, VEN: 37.5 MM HG (ref 30–50)
POC ANION GAP: 6 MMOL/L (ref 7–16)
POC ANION GAP: 8 MMOL/L (ref 7–16)
POC CREATININE: 3.1 MG/DL (ref 0.51–1.19)
POC CREATININE: 4.2 MG/DL (ref 0.51–1.19)
POC HEMOGLOBIN (CALC): 10.3 G/DL (ref 12–16)
POC HEMOGLOBIN (CALC): 10.6 G/DL (ref 12–16)
POC LACTIC ACID: 0.3 MMOL/L (ref 0.56–1.39)
POC LACTIC ACID: 1.2 MMOL/L (ref 0.56–1.39)
POTASSIUM BLD-SCNC: 5 MMOL/L (ref 3.5–4.5)
POTASSIUM BLD-SCNC: 6.2 MMOL/L (ref 3.5–4.5)
POTASSIUM SERPL-SCNC: 5.1 MMOL/L (ref 3.7–5.3)
POTASSIUM SERPL-SCNC: 5.8 MMOL/L (ref 3.7–5.3)
POTASSIUM SERPL-SCNC: 6.5 MMOL/L (ref 3.7–5.3)
PROT SERPL-MCNC: 6.6 G/DL (ref 6.6–8.7)
PROT UR STRIP-MCNC: NEGATIVE MG/DL
PROT UR STRIP-MCNC: NEGATIVE MG/DL
PROTHROMBIN TIME: 18.5 SEC (ref 11.7–14.9)
RBC # BLD AUTO: 3.21 M/UL (ref 3.95–5.11)
RBC #/AREA URNS HPF: ABNORMAL /HPF (ref 0–2)
RBC #/AREA URNS HPF: ABNORMAL /HPF (ref 0–4)
SALICYLATES SERPL-MCNC: <0.5 MG/DL (ref 0–10)
SODIUM BLD-SCNC: 141 MMOL/L (ref 138–146)
SODIUM BLD-SCNC: 143 MMOL/L (ref 138–146)
SODIUM SERPL-SCNC: 140 MMOL/L (ref 136–145)
SODIUM SERPL-SCNC: 142 MMOL/L (ref 136–145)
SODIUM SERPL-SCNC: 142 MMOL/L (ref 136–145)
SODIUM UR-SCNC: 126 MMOL/L
SP GR UR STRIP: 1.01 (ref 1–1.03)
SP GR UR STRIP: 1.01 (ref 1–1.03)
TOTAL PROTEIN, URINE: <4 MG/DL
TOXIC TRICYCLIC SC,BLOOD: NEGATIVE
TROPONIN I SERPL HS-MCNC: 51 NG/L (ref 0–14)
TSH SERPL DL<=0.05 MIU/L-ACNC: 1.07 UIU/ML (ref 0.27–4.2)
URINE TOTAL PROTEIN CREATININE RATIO: ABNORMAL
UROBILINOGEN UR STRIP-ACNC: NORMAL EU/DL (ref 0–1)
UROBILINOGEN UR STRIP-ACNC: NORMAL EU/DL (ref 0–1)
WBC #/AREA URNS HPF: ABNORMAL /HPF (ref 0–5)
WBC #/AREA URNS HPF: ABNORMAL /HPF (ref 0–5)
WBC OTHER # BLD: 5.9 K/UL (ref 3.5–11.3)

## 2024-03-09 PROCEDURE — 80143 DRUG ASSAY ACETAMINOPHEN: CPT

## 2024-03-09 PROCEDURE — 84443 ASSAY THYROID STIM HORMONE: CPT

## 2024-03-09 PROCEDURE — 94761 N-INVAS EAR/PLS OXIMETRY MLT: CPT

## 2024-03-09 PROCEDURE — 86225 DNA ANTIBODY NATIVE: CPT

## 2024-03-09 PROCEDURE — 84300 ASSAY OF URINE SODIUM: CPT

## 2024-03-09 PROCEDURE — 99222 1ST HOSP IP/OBS MODERATE 55: CPT | Performed by: PSYCHIATRY & NEUROLOGY

## 2024-03-09 PROCEDURE — 83521 IG LIGHT CHAINS FREE EACH: CPT

## 2024-03-09 PROCEDURE — 6360000002 HC RX W HCPCS

## 2024-03-09 PROCEDURE — 99222 1ST HOSP IP/OBS MODERATE 55: CPT | Performed by: INTERNAL MEDICINE

## 2024-03-09 PROCEDURE — 85025 COMPLETE CBC W/AUTO DIFF WBC: CPT

## 2024-03-09 PROCEDURE — 2700000000 HC OXYGEN THERAPY PER DAY

## 2024-03-09 PROCEDURE — 2060000000 HC ICU INTERMEDIATE R&B

## 2024-03-09 PROCEDURE — 93005 ELECTROCARDIOGRAM TRACING: CPT | Performed by: STUDENT IN AN ORGANIZED HEALTH CARE EDUCATION/TRAINING PROGRAM

## 2024-03-09 PROCEDURE — 84155 ASSAY OF PROTEIN SERUM: CPT

## 2024-03-09 PROCEDURE — 82140 ASSAY OF AMMONIA: CPT

## 2024-03-09 PROCEDURE — 83605 ASSAY OF LACTIC ACID: CPT

## 2024-03-09 PROCEDURE — 82947 ASSAY GLUCOSE BLOOD QUANT: CPT

## 2024-03-09 PROCEDURE — 6370000000 HC RX 637 (ALT 250 FOR IP)

## 2024-03-09 PROCEDURE — 85610 PROTHROMBIN TIME: CPT

## 2024-03-09 PROCEDURE — 99223 1ST HOSP IP/OBS HIGH 75: CPT | Performed by: INTERNAL MEDICINE

## 2024-03-09 PROCEDURE — 86038 ANTINUCLEAR ANTIBODIES: CPT

## 2024-03-09 PROCEDURE — 70544 MR ANGIOGRAPHY HEAD W/O DYE: CPT

## 2024-03-09 PROCEDURE — 82550 ASSAY OF CK (CPK): CPT

## 2024-03-09 PROCEDURE — 99291 CRITICAL CARE FIRST HOUR: CPT

## 2024-03-09 PROCEDURE — 80076 HEPATIC FUNCTION PANEL: CPT

## 2024-03-09 PROCEDURE — 81001 URINALYSIS AUTO W/SCOPE: CPT

## 2024-03-09 PROCEDURE — 82803 BLOOD GASES ANY COMBINATION: CPT

## 2024-03-09 PROCEDURE — 82330 ASSAY OF CALCIUM: CPT

## 2024-03-09 PROCEDURE — 2580000003 HC RX 258: Performed by: INTERNAL MEDICINE

## 2024-03-09 PROCEDURE — 96375 TX/PRO/DX INJ NEW DRUG ADDON: CPT

## 2024-03-09 PROCEDURE — 80074 ACUTE HEPATITIS PANEL: CPT

## 2024-03-09 PROCEDURE — 96374 THER/PROPH/DIAG INJ IV PUSH: CPT

## 2024-03-09 PROCEDURE — 85730 THROMBOPLASTIN TIME PARTIAL: CPT

## 2024-03-09 PROCEDURE — 82553 CREATINE MB FRACTION: CPT

## 2024-03-09 PROCEDURE — 84165 PROTEIN E-PHORESIS SERUM: CPT

## 2024-03-09 PROCEDURE — 86334 IMMUNOFIX E-PHORESIS SERUM: CPT

## 2024-03-09 PROCEDURE — 6360000002 HC RX W HCPCS: Performed by: EMERGENCY MEDICINE

## 2024-03-09 PROCEDURE — G0480 DRUG TEST DEF 1-7 CLASSES: HCPCS

## 2024-03-09 PROCEDURE — 83516 IMMUNOASSAY NONANTIBODY: CPT

## 2024-03-09 PROCEDURE — 6370000000 HC RX 637 (ALT 250 FOR IP): Performed by: STUDENT IN AN ORGANIZED HEALTH CARE EDUCATION/TRAINING PROGRAM

## 2024-03-09 PROCEDURE — 70450 CT HEAD/BRAIN W/O DYE: CPT

## 2024-03-09 PROCEDURE — 2500000003 HC RX 250 WO HCPCS: Performed by: STUDENT IN AN ORGANIZED HEALTH CARE EDUCATION/TRAINING PROGRAM

## 2024-03-09 PROCEDURE — 84166 PROTEIN E-PHORESIS/URINE/CSF: CPT

## 2024-03-09 PROCEDURE — 36415 COLL VENOUS BLD VENIPUNCTURE: CPT

## 2024-03-09 PROCEDURE — 80051 ELECTROLYTE PANEL: CPT

## 2024-03-09 PROCEDURE — 80179 DRUG ASSAY SALICYLATE: CPT

## 2024-03-09 PROCEDURE — 74018 RADEX ABDOMEN 1 VIEW: CPT

## 2024-03-09 PROCEDURE — 84156 ASSAY OF PROTEIN URINE: CPT

## 2024-03-09 PROCEDURE — 2500000003 HC RX 250 WO HCPCS

## 2024-03-09 PROCEDURE — 51702 INSERT TEMP BLADDER CATH: CPT

## 2024-03-09 PROCEDURE — 70547 MR ANGIOGRAPHY NECK W/O DYE: CPT

## 2024-03-09 PROCEDURE — 76775 US EXAM ABDO BACK WALL LIM: CPT

## 2024-03-09 PROCEDURE — 6370000000 HC RX 637 (ALT 250 FOR IP): Performed by: INTERNAL MEDICINE

## 2024-03-09 PROCEDURE — 80307 DRUG TEST PRSMV CHEM ANLYZR: CPT

## 2024-03-09 PROCEDURE — 86160 COMPLEMENT ANTIGEN: CPT

## 2024-03-09 PROCEDURE — 82570 ASSAY OF URINE CREATININE: CPT

## 2024-03-09 PROCEDURE — 99223 1ST HOSP IP/OBS HIGH 75: CPT | Performed by: PSYCHIATRY & NEUROLOGY

## 2024-03-09 PROCEDURE — 6360000002 HC RX W HCPCS: Performed by: INTERNAL MEDICINE

## 2024-03-09 PROCEDURE — 82436 ASSAY OF URINE CHLORIDE: CPT

## 2024-03-09 PROCEDURE — 83874 ASSAY OF MYOGLOBIN: CPT

## 2024-03-09 PROCEDURE — 71045 X-RAY EXAM CHEST 1 VIEW: CPT

## 2024-03-09 PROCEDURE — 2580000003 HC RX 258

## 2024-03-09 PROCEDURE — 70551 MRI BRAIN STEM W/O DYE: CPT

## 2024-03-09 PROCEDURE — 80048 BASIC METABOLIC PNL TOTAL CA: CPT

## 2024-03-09 PROCEDURE — 84484 ASSAY OF TROPONIN QUANT: CPT

## 2024-03-09 PROCEDURE — 82565 ASSAY OF CREATININE: CPT

## 2024-03-09 PROCEDURE — 85014 HEMATOCRIT: CPT

## 2024-03-09 PROCEDURE — 2500000003 HC RX 250 WO HCPCS: Performed by: INTERNAL MEDICINE

## 2024-03-09 PROCEDURE — 84520 ASSAY OF UREA NITROGEN: CPT

## 2024-03-09 RX ORDER — NALOXONE HYDROCHLORIDE 0.4 MG/ML
0.4 INJECTION, SOLUTION INTRAMUSCULAR; INTRAVENOUS; SUBCUTANEOUS ONCE
Status: DISCONTINUED | OUTPATIENT
Start: 2024-03-09 | End: 2024-03-13

## 2024-03-09 RX ORDER — ACETAMINOPHEN 325 MG/1
650 TABLET ORAL EVERY 6 HOURS PRN
Status: DISCONTINUED | OUTPATIENT
Start: 2024-03-09 | End: 2024-03-15 | Stop reason: HOSPADM

## 2024-03-09 RX ORDER — ONDANSETRON 2 MG/ML
4 INJECTION INTRAMUSCULAR; INTRAVENOUS EVERY 6 HOURS PRN
Status: DISCONTINUED | OUTPATIENT
Start: 2024-03-09 | End: 2024-03-15 | Stop reason: HOSPADM

## 2024-03-09 RX ORDER — SODIUM CHLORIDE 0.9 % (FLUSH) 0.9 %
5-40 SYRINGE (ML) INJECTION EVERY 12 HOURS SCHEDULED
Status: DISCONTINUED | OUTPATIENT
Start: 2024-03-09 | End: 2024-03-15 | Stop reason: HOSPADM

## 2024-03-09 RX ORDER — SODIUM CHLORIDE 9 MG/ML
INJECTION, SOLUTION INTRAVENOUS PRN
Status: DISCONTINUED | OUTPATIENT
Start: 2024-03-09 | End: 2024-03-15 | Stop reason: HOSPADM

## 2024-03-09 RX ORDER — FUROSEMIDE 10 MG/ML
60 INJECTION INTRAMUSCULAR; INTRAVENOUS ONCE
Status: COMPLETED | OUTPATIENT
Start: 2024-03-09 | End: 2024-03-09

## 2024-03-09 RX ORDER — CARVEDILOL 12.5 MG/1
12.5 TABLET ORAL 2 TIMES DAILY WITH MEALS
Status: DISCONTINUED | OUTPATIENT
Start: 2024-03-09 | End: 2024-03-11

## 2024-03-09 RX ORDER — 0.9 % SODIUM CHLORIDE 0.9 %
1000 INTRAVENOUS SOLUTION INTRAVENOUS ONCE
Status: COMPLETED | OUTPATIENT
Start: 2024-03-09 | End: 2024-03-09

## 2024-03-09 RX ORDER — LABETALOL HYDROCHLORIDE 5 MG/ML
10 INJECTION, SOLUTION INTRAVENOUS EVERY 4 HOURS PRN
Status: DISCONTINUED | OUTPATIENT
Start: 2024-03-09 | End: 2024-03-15 | Stop reason: HOSPADM

## 2024-03-09 RX ORDER — DEXTROSE MONOHYDRATE 25 G/50ML
25 INJECTION, SOLUTION INTRAVENOUS ONCE
Status: COMPLETED | OUTPATIENT
Start: 2024-03-09 | End: 2024-03-09

## 2024-03-09 RX ORDER — ATORVASTATIN CALCIUM 40 MG/1
40 TABLET, FILM COATED ORAL DAILY
Status: DISCONTINUED | OUTPATIENT
Start: 2024-03-09 | End: 2024-03-15 | Stop reason: HOSPADM

## 2024-03-09 RX ORDER — SODIUM CHLORIDE 9 MG/ML
INJECTION, SOLUTION INTRAVENOUS CONTINUOUS
Status: DISCONTINUED | OUTPATIENT
Start: 2024-03-09 | End: 2024-03-09

## 2024-03-09 RX ORDER — NALOXONE HYDROCHLORIDE 1 MG/ML
INJECTION INTRAMUSCULAR; INTRAVENOUS; SUBCUTANEOUS
Status: COMPLETED
Start: 2024-03-09 | End: 2024-03-09

## 2024-03-09 RX ORDER — LORAZEPAM 2 MG/ML
1 INJECTION INTRAMUSCULAR ONCE
Status: COMPLETED | OUTPATIENT
Start: 2024-03-09 | End: 2024-03-09

## 2024-03-09 RX ORDER — ACETAMINOPHEN 650 MG/1
650 SUPPOSITORY RECTAL EVERY 6 HOURS PRN
Status: DISCONTINUED | OUTPATIENT
Start: 2024-03-09 | End: 2024-03-15 | Stop reason: HOSPADM

## 2024-03-09 RX ORDER — LABETALOL 200 MG/1
200 TABLET, FILM COATED ORAL EVERY 8 HOURS SCHEDULED
Status: DISCONTINUED | OUTPATIENT
Start: 2024-03-09 | End: 2024-03-11

## 2024-03-09 RX ORDER — HYDRALAZINE HYDROCHLORIDE 50 MG/1
50 TABLET, FILM COATED ORAL 3 TIMES DAILY
Status: DISCONTINUED | OUTPATIENT
Start: 2024-03-09 | End: 2024-03-09

## 2024-03-09 RX ORDER — FENTANYL CITRATE 50 UG/ML
100 INJECTION, SOLUTION INTRAMUSCULAR; INTRAVENOUS ONCE
Status: COMPLETED | OUTPATIENT
Start: 2024-03-09 | End: 2024-03-09

## 2024-03-09 RX ORDER — SODIUM CHLORIDE 0.9 % (FLUSH) 0.9 %
5-40 SYRINGE (ML) INJECTION PRN
Status: DISCONTINUED | OUTPATIENT
Start: 2024-03-09 | End: 2024-03-15 | Stop reason: HOSPADM

## 2024-03-09 RX ORDER — DEXTROSE MONOHYDRATE 100 MG/ML
INJECTION, SOLUTION INTRAVENOUS CONTINUOUS PRN
Status: DISCONTINUED | OUTPATIENT
Start: 2024-03-09 | End: 2024-03-15 | Stop reason: HOSPADM

## 2024-03-09 RX ORDER — CITALOPRAM 20 MG/1
20 TABLET ORAL DAILY
Status: DISCONTINUED | OUTPATIENT
Start: 2024-03-09 | End: 2024-03-10

## 2024-03-09 RX ORDER — POLYETHYLENE GLYCOL 3350 17 G/17G
17 POWDER, FOR SOLUTION ORAL DAILY PRN
Status: DISCONTINUED | OUTPATIENT
Start: 2024-03-09 | End: 2024-03-15 | Stop reason: HOSPADM

## 2024-03-09 RX ORDER — ONDANSETRON 4 MG/1
4 TABLET, ORALLY DISINTEGRATING ORAL EVERY 8 HOURS PRN
Status: DISCONTINUED | OUTPATIENT
Start: 2024-03-09 | End: 2024-03-15 | Stop reason: HOSPADM

## 2024-03-09 RX ORDER — 0.9 % SODIUM CHLORIDE 0.9 %
500 INTRAVENOUS SOLUTION INTRAVENOUS ONCE
Status: DISCONTINUED | OUTPATIENT
Start: 2024-03-09 | End: 2024-03-09

## 2024-03-09 RX ORDER — HYDRALAZINE HYDROCHLORIDE 20 MG/ML
10 INJECTION INTRAMUSCULAR; INTRAVENOUS EVERY 6 HOURS PRN
Status: DISCONTINUED | OUTPATIENT
Start: 2024-03-09 | End: 2024-03-09

## 2024-03-09 RX ADMIN — CITALOPRAM 20 MG: 20 TABLET, FILM COATED ORAL at 15:28

## 2024-03-09 RX ADMIN — LORAZEPAM 1 MG: 2 INJECTION INTRAMUSCULAR; INTRAVENOUS at 17:08

## 2024-03-09 RX ADMIN — SODIUM BICARBONATE: 84 INJECTION, SOLUTION INTRAVENOUS at 20:53

## 2024-03-09 RX ADMIN — FUROSEMIDE 60 MG: 10 INJECTION, SOLUTION INTRAMUSCULAR; INTRAVENOUS at 19:27

## 2024-03-09 RX ADMIN — FENTANYL CITRATE 100 MCG: 50 INJECTION INTRAMUSCULAR; INTRAVENOUS at 10:12

## 2024-03-09 RX ADMIN — ACETAMINOPHEN 650 MG: 325 TABLET ORAL at 17:02

## 2024-03-09 RX ADMIN — SODIUM CHLORIDE 1000 ML: 9 INJECTION, SOLUTION INTRAVENOUS at 12:54

## 2024-03-09 RX ADMIN — LABETALOL HYDROCHLORIDE 200 MG: 200 TABLET, FILM COATED ORAL at 23:08

## 2024-03-09 RX ADMIN — DEXTROSE MONOHYDRATE 250 ML: 100 INJECTION, SOLUTION INTRAVENOUS at 19:40

## 2024-03-09 RX ADMIN — INSULIN HUMAN 10 UNITS: 100 INJECTION, SOLUTION PARENTERAL at 09:54

## 2024-03-09 RX ADMIN — SODIUM CHLORIDE, PRESERVATIVE FREE 10 ML: 5 INJECTION INTRAVENOUS at 19:28

## 2024-03-09 RX ADMIN — NALOXONE HYDROCHLORIDE 0.4 MG: 1 INJECTION PARENTERAL at 10:23

## 2024-03-09 RX ADMIN — INSULIN HUMAN 10 UNITS: 100 INJECTION, SOLUTION PARENTERAL at 19:43

## 2024-03-09 RX ADMIN — SODIUM BICARBONATE: 84 INJECTION, SOLUTION INTRAVENOUS at 15:47

## 2024-03-09 RX ADMIN — DEXTROSE MONOHYDRATE 25 G: 25 INJECTION, SOLUTION INTRAVENOUS at 09:53

## 2024-03-09 RX ADMIN — APIXABAN 5 MG: 5 TABLET, FILM COATED ORAL at 21:26

## 2024-03-09 ASSESSMENT — PAIN SCALES - GENERAL
PAINLEVEL_OUTOF10: 6
PAINLEVEL_OUTOF10: 0

## 2024-03-09 ASSESSMENT — PAIN DESCRIPTION - ORIENTATION: ORIENTATION: LEFT

## 2024-03-09 ASSESSMENT — PAIN DESCRIPTION - LOCATION
LOCATION: OTHER (COMMENT)
LOCATION: HIP

## 2024-03-09 ASSESSMENT — PAIN SCALES - WONG BAKER
WONGBAKER_NUMERICALRESPONSE: 6
WONGBAKER_NUMERICALRESPONSE: HURTS EVEN MORE
WONGBAKER_NUMERICALRESPONSE: HURTS EVEN MORE
WONGBAKER_NUMERICALRESPONSE: 6
WONGBAKER_NUMERICALRESPONSE: 6
WONGBAKER_NUMERICALRESPONSE: HURTS EVEN MORE

## 2024-03-09 NOTE — ED PROVIDER NOTES
[facility]     Emergency Department     Faculty Note/ Attestation      Pt Name: Criselda Tinoco                                       MRN: [id number]  Birthdate 1950  Date of evaluation: 3/9/2024    Patients PCP:    [address] MD    Note Started: 8:25 AM EST      Attestation  I performed a history and physical examination of the patient and discussed management with the resident. I reviewed the resident’s note and agree with the documented findings and plan of care. Any areas of disagreement are noted on the chart. I was personally present for the key portions of any procedures. I have documented in the chart those procedures where I was not present during the key portions. I have reviewed the emergency nurses triage note. I agree with the chief complaint, past medical history, past surgical history, allergies, medications, social and family history as documented unless otherwise noted below.    For Physician Assistant/ Nurse Practitioner cases/documentation I have personally evaluated this patient and have completed at least one if not all key elements of the E/M (history, physical exam, and MDM). Additional findings are as noted.      Initial Screens:        Oli Coma Scale  Eye Opening: Spontaneous  Best Verbal Response: Oriented  Best Motor Response: Obeys commands  Oli Coma Scale Score: 15    Vitals:    Vitals:    03/09/24 0820   BP: (!) 123/95   Pulse: 60   Resp: 11       CHIEF COMPLAINT       Chief Complaint   Patient presents with    Cerebrovascular Accident             DIAGNOSTIC RESULTS             RADIOLOGY:   CT HEAD WO CONTRAST    (Results Pending)   CTA HEAD NECK W CONTRAST    (Results Pending)         LABS:  Labs Reviewed - No data to display      EMERGENCY DEPARTMENT COURSE:     -------------------------  BP: (!) 123/95,  , Pulse: 60, Respirations: 11      Comments    74-year-old with aphasia and left-sided facial droop since 715 this morning, unclear when her  last known well was, she stays in [facility], per EMS she typically has good neurologic function    Blood pressure 80/50 in the field, on arrival 120s over 90s, taken directly to the scanner for a dry CT, patient is maintaining her airway and speaking however does have some slurred speech    Stroke team at bedside prior to patient's arrival, due to trauma patient in the scanner, CTA was not performed immediately, will get complete neuroassessment and labs, further imaging after CT scanner is available    Patient is reported to be on anticoagulation, chart notes Eliquis    10:37 AM EST  Patient had significant discomfort after the Lucero was placed, she was screaming and yelling out in the room and pain.  She was awake and alert, blood pressure was stable.  We ordered 100 mcg of fentanyl, nurse confirms it was given via slow push however shortly thereafter patient had dyspnea, she was somnolent, very shallow breathing with slow respirations, placed on oxygen and oxygen saturation came up to 97%, pupils were small yet reactive, 0.4 mg of Narcan were given, patient return to alertness, began to breathe easily and spontaneously.  While we are waiting on Narcan, we did set up for intubation, patient is altered however it is unclear what her baseline is, she is back to her new baseline from time of arrival, will monitor closely, may need airway protection for MRI    CRITICAL CARE: There was a high probability of clinically significant/life threatening deterioration in this patient's condition which required my urgent intervention.  Total critical care time was 60 minutes.  This excludes any time for separately reportable procedures.       (Please note that portions of this note were completed with a voice recognition program.  Efforts were made to edit the dictations but occasionally words are mis-transcribed.)      Jordin Pepe MD,, MD  Attending Emergency Physician         Jordin Pepe MD  03/10/24 2452

## 2024-03-09 NOTE — CODE DOCUMENTATION
Pt began to desat and her SpO2 was in the high 80's.   NC was applied and 4L with no improvement.  Increased O2 to 9 L NC and patent began to desat more.  Pt was then placed on 15 L NRB and placed on code cart.  Pt's pupils were pinpoint and Narcan was ordered and given.  Pt responded well to Narcan and began to have increased SpO2.  Pt was taken off of NRB and put on 4L NC.

## 2024-03-09 NOTE — ED NOTES
Pt. Arrives to ED via LS 11 for c/o Cerebrovascular accident.  EMS was called out to [facility] this morning after the nurse found patient at 07:15 with left sided weakness, aphasia, and facial droop.  EMS rated the race alert a 6 at the scene.  During transport pt became hypotensive with a BP of 80/50.  IV fluids were started.  Upon arrival pt was taken directly to CT scan for a dry CT due to not having a creatinine at the time.  Pt was then taken to room 12, placed on telemetry,  labs, EKG, and xrays were done.

## 2024-03-09 NOTE — ED NOTES
ED to inpatient nurses report      Chief Complaint:  Chief Complaint   Patient presents with    Cerebrovascular Accident     Present to ED from: [facility]    MOA:     LOC: alert and orientated to name, place, date  Mobility: Fully dependent  Oxygen Baseline: Room Air    Current needs required: 4L NC   Pending ED orders: None  Present condition: Stable    Why did the patient come to the ED?   Pt. Arrives to ED via LS 11 for c/o Cerebrovascular accident.  EMS was called out to [facility] this morning after the nurse found patient at 07:15 with left sided weakness, aphasia, and facial droop.  EMS rated the race alert a 6 at the scene.  During transport pt became hypotensive with a BP of 80/50.  IV fluids were started.  Upon arrival pt was taken directly to CT scan for a dry CT due to not having a creatinine at the time.  Pt was then taken to room 12, placed on telemetry,  labs, EKG, and xrays were done.                  What is the plan? Neuro, MRI  Any procedures or intervention occur? CT, xray, labs  Any safety concerns??    Mental Status:  Level of Consciousness: Alert (0)    Psych Assessment:      Vital signs   Vitals:    03/09/24 0930 03/09/24 0945 03/09/24 1023 03/09/24 1042   BP: (!) 135/50 (!) 106/51 (!) 129/47    Pulse: 59 50 81 75   Resp: 14 16 17 15   Temp:       TempSrc:       SpO2: 94% 95% 97% 94%   Weight:       Height:            Vitals:  Patient Vitals for the past 24 hrs:   BP Temp Temp src Pulse Resp SpO2 Height Weight   03/09/24 1042 -- -- -- 75 15 94 % -- --   03/09/24 1023 (!) 129/47 -- -- 81 17 97 % -- --   03/09/24 0945 (!) 106/51 -- -- 50 16 95 % -- --   03/09/24 0930 (!) 135/50 -- -- 59 14 94 % -- --   03/09/24 0915 (!) 123/95 -- -- 80 21 95 % -- --   03/09/24 0900 (!) 166/146 -- -- 55 12 93 % -- --   03/09/24 0845 (!) 144/110 -- -- 65 16 93 % -- --   03/09/24 0832 (!) 157/66 -- -- 75 12 94 % -- --   03/09/24 0831 -- -- -- -- -- -- 1.676 m (5' 6\") 99.8 kg (220 lb)  August    leg left leg/bug bite    Cerebral artery occlusion with cerebral infarction (HCC)     TIA 2014    COVID-19     ONE YR AGO IN 4/25/2020 fever and cough    Diverticulosis of colon (without mention of hemorrhage)     GERD (gastroesophageal reflux disease)     on rx    History of blood transfusion     approx 2020    -no reactions    History of CHF (congestive heart failure)     History of MI (myocardial infarction) 2005    thought due to a blood clot    History of ovarian cyst 1970    had oopherectomy holly    History of peritonitis 1968    due to ruptured appendix age 17    HTN (hypertension)     Hx of blood clots     right leg    Hyperlipidemia     Intestinal or peritoneal adhesions with obstruction (postoperative) (postinfection) (HCC)     Kidney infection     renal failure/sepsis/spider bite    Lateral epicondylitis  of elbow     MDRO (multiple drug resistant organisms) resistance     c diff    Muscle strain     right posterior shoulder    Other abnormal glucose     PONV (postoperative nausea and vomiting)     dry heaves    Pre-diabetes     Restless legs syndrome (RLS)     Snores     no cpap    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)     TIA (transient ischemic attack) 2014    Under care of service provider 07/12/2023    bmxgayible-kjrsbx-dbkqly-last visit june 2023    Under care of service provider 07/12/2023    minerpgqm-ffv-zk vincent-last visit may 2023    Uses walker     Vitamin D deficiency     Wears glasses     Wellness examination 07/12/2023    [id number] clinic-last visit july 2023       Labs:  Labs Reviewed   STROKE PANEL - Abnormal; Notable for the following components:       Result Value    Potassium 6.5 (*)     Chloride 110 (*)     CO2 19 (*)     BUN 83 (*)     Creatinine 3.4 (*)     Est, Glom Filt Rate 14 (*)     RBC 3.21 (*)     Hemoglobin 10.0 (*)     Hematocrit 32.1 (*)     Eosinophils % 5 (*)     Immature Granulocytes 1 (*)     Myoglobin 85 (*)     Protime 18.5 (*)

## 2024-03-09 NOTE — CONSULTS
ENDOSCOPY N/A 2019    EGD ESOPHAGOGASTRODUODENOSCOPY performed by Tato De Leon MD at [facility] OR    UPPER GASTROINTESTINAL ENDOSCOPY N/A 2019    EGD BIOPSY performed by Ronaldo Segundo MD at [facility] ENDO    UPPER GASTROINTESTINAL ENDOSCOPY N/A 2019    EGD BIOPSY performed by Tato De Leon MD at [facility] ENDO    WISDOM TOOTH EXTRACTION      WRIST FRACTURE SURGERY Left 2019    WRIST OPEN REDUCTION INTERNAL FIXATION performed by Lukasz Bonilla MD at [facility] OR       Allergies   Allergen Reactions    Fentanyl Other (See Comments)     Chest Wall ridigity    Bactrim [Sulfamethoxazole-Trimethoprim] Other (See Comments)     confusion    Cat Hair Extract Rash    Codeine Itching    Diazepam Other (See Comments)     Unsure of reaction    Meperidine Hcl Other (See Comments)     Unsure of reaction    Seasonal Other (See Comments)     hayfever       Social History     Socioeconomic History    Marital status:      Spouse name: Not on file    Number of children: Not on file    Years of education: Not on file    Highest education level: Not on file   Occupational History    Occupation: retired   Tobacco Use    Smoking status: Former     Current packs/day: 0.00     Average packs/day: 0.5 packs/day for 21.9 years (10.9 ttl pk-yrs)     Types: Cigarettes     Start date: 1995     Quit date: 2017     Years since quittin.7    Smokeless tobacco: Never   Vaping Use    Vaping Use: Never used   Substance and Sexual Activity    Alcohol use: No     Alcohol/week: 0.0 standard drinks of alcohol    Drug use: No    Sexual activity: Not on file   Other Topics Concern    Not on file   Social History Narrative    Not on file     Social Determinants of Health     Financial Resource Strain: Low Risk  (2022)    Overall Financial Resource Strain (CARDIA)     Difficulty of Paying Living Expenses: Not hard at all   Food Insecurity: No Food Insecurity (3/9/2024)    Hunger Vital Sign     Worried About Running Out of  electrophoresis to r/o element to occult paraprotein disease.  6. Will order Hepatitis B and C, KAILEY, Complement levels.    Nutrition   Please ensure that patient is on a renal diet/TF. Avoid nephrotoxic drugs/contrast exposure.    Thank you for the consultation. Please do not hesitate to contact us for any further questions/concerns. We will continue to follow along with you.     Dannielle Sandoval [facility]  [facility]    Attending Physician Statement  I have discussed the care of Criselda Tinoco, including pertinent history and exam findings with the CNP. I have reviewed the key elements of all parts of the encounter with the CNP. I have seen and examined the patient . I agree with the assessment and plan and status of the problem list as documented.  Addiitionally I recommend continue serial lab data and adjust IV fluids as necessary.Patient has some risk of requiring dialysis depending on the trajectory of the serum potassium, in particular.    Colt Parr MD  Nephrology Attending Physician  [facility]  3/9/2024

## 2024-03-09 NOTE — CONSULTS
CYST REMOVAL Right     right facial    FINGER CLOSED REDUCTION Left 2022    CLOSED REDUCTION PINNING LEFT LITTLE FINGER performed by JARRELL Quintanilla MD at [facility] OR    HYSTERECTOMY (CERVIX STATUS UNKNOWN)      taken as a result of recurring cysts    LUMBAR FUSION N/A 02/10/2020    LUMBAR L4-5 POSTERIOR  DECOMPRESSION INSTRUMENTATION FUSION WCEMENT AUGMENTATION/ performed by Micah Matthews MD at [facility] OR    LUMBAR FUSION N/A 2020    L5-S1 PLIF L4-L5 REVISION performed by Micah Matthews MD at [facility] OR    OTHER SURGICAL HISTORY  2014    FESS    OVARY REMOVAL  1970    UNILATERAL due to cyst    OVARY REMOVAL      partial, due to cyst    SINUS SURGERY      UPPER GASTROINTESTINAL ENDOSCOPY N/A 2019    EGD ESOPHAGOGASTRODUODENOSCOPY performed by Tato De Leon MD at [facility] OR    UPPER GASTROINTESTINAL ENDOSCOPY N/A 2019    EGD BIOPSY performed by Ronaldo Segundo MD at [facility] ENDO    UPPER GASTROINTESTINAL ENDOSCOPY N/A 2019    EGD BIOPSY performed by Tato De Leon MD at [facility] ENDO    WISDOM TOOTH EXTRACTION      WRIST FRACTURE SURGERY Left 2019    WRIST OPEN REDUCTION INTERNAL FIXATION performed by Lukasz Bonilla MD at [facility] OR       Social History:   Social History     Socioeconomic History    Marital status:      Spouse name: Not on file    Number of children: Not on file    Years of education: Not on file    Highest education level: Not on file   Occupational History    Occupation: retired   Tobacco Use    Smoking status: Former     Current packs/day: 0.00     Average packs/day: 0.5 packs/day for 21.9 years (10.9 ttl pk-yrs)     Types: Cigarettes     Start date: 1995     Quit date: 2017     Years since quittin.7    Smokeless tobacco: Never   Vaping Use    Vaping Use: Never used   Substance and Sexual Activity    Alcohol use: No     Alcohol/week: 0.0 standard drinks of alcohol    Drug use: No    Sexual activity: Not on file  or impossible.  For example, in conversation about provided materials, examiner can identify picture or naming card content from patient's response.   10.  Dysarthria:  0 - normal  11.  Extinction and Inattention:  0 - no abnormality    TOTAL:  14  1   SKIN:  no rash      Modified [place] Score Scale:     [] Zero: No symptoms at all   [] 1: No significant disability despite symptoms; able to carry out all usual duties and activities   [] 2: Slight disability; unable to carry out all previous activities, but able to look after own affairs without assistance   [x] 3:Moderate disability; requiring some help, but able to walk without assistance   [] 4: Moderately severe disability; unable to walk and attend to bodily needs without assistance   [] 5:Severe disability; bedridden, incontinent and requiring constant nursing care and attention    LABS AND IMAGING:     CBC with Differential:    Lab Results   Component Value Date/Time    WBC 6.3 01/11/2024 10:14 AM    RBC 3.47 01/11/2024 10:14 AM    HGB 11.1 01/11/2024 10:14 AM    HCT 34.5 01/11/2024 10:14 AM     01/11/2024 10:14 AM    MCV 99.4 01/11/2024 10:14 AM    MCH 32.0 01/11/2024 10:14 AM    MCHC 32.2 01/11/2024 10:14 AM    RDW 12.8 01/11/2024 10:14 AM    METASPCT 2 07/11/2022 04:55 AM    LYMPHOPCT 30 08/12/2023 07:17 AM    MONOPCT 11 08/12/2023 07:17 AM    BASOPCT 1 08/12/2023 07:17 AM    MONOSABS 0.60 08/12/2023 07:17 AM    LYMPHSABS 1.50 08/12/2023 07:17 AM    EOSABS 0.60 08/12/2023 07:17 AM    BASOSABS 0.10 08/12/2023 07:17 AM    DIFFTYPE NOT REPORTED 12/22/2021 03:22 PM         BMP:    Lab Results   Component Value Date/Time     01/12/2024 07:07 AM    K 4.2 01/12/2024 07:07 AM     01/12/2024 07:07 AM    CO2 23 01/12/2024 07:07 AM    BUN 27 01/12/2024 07:07 AM    LABALBU 3.6 11/17/2023 06:42 AM    CREATININE 4.2 03/09/2024 08:29 AM    CREATININE 1.0 01/12/2024 07:07 AM    CALCIUM 8.8 01/12/2024 07:07 AM    GFRAA >60 09/09/2022 06:26 AM    LABGLOM

## 2024-03-09 NOTE — H&P
kg/m²   Tmax: Temp (24hrs), Av.9 °F (36.6 °C), Min:97.9 °F (36.6 °C), Max:97.9 °F (36.6 °C)    Last Body weight:   Wt Readings from Last 3 Encounters:   24 99.8 kg (220 lb)   23 86.2 kg (190 lb)   23 86.2 kg (190 lb)     Body Mass Index : Body mass index is 35.51 kg/m².      Physical Exam  Constitutional:       General: She is in acute distress.      Appearance: She is obese. She is ill-appearing and toxic-appearing.   HENT:      Nose: Nose normal. No congestion.      Mouth/Throat:      Pharynx: Oropharynx is clear. No oropharyngeal exudate.   Eyes:      Conjunctiva/sclera: Conjunctivae normal.   Cardiovascular:      Rate and Rhythm: Normal rate and regular rhythm.   Pulmonary:      Effort: Pulmonary effort is normal. No respiratory distress.      Breath sounds: Normal breath sounds.   Abdominal:      General: Bowel sounds are normal.      Tenderness: There is no abdominal tenderness.   Skin:     General: Skin is warm.      Coloration: Skin is not jaundiced.   Neurological:      Mental Status: She is alert.      Motor: Weakness and abnormal muscle tone present.            INVESTIGATIONS     Laboratory Testing:     Recent Results (from the past 24 hour(s))   Venous Blood Gas, POC    Collection Time: 24  8:29 AM   Result Value Ref Range    pH, Cheikh 7.332 7.320 - 7.430    pCO2, Cheikh 37.5 (L) 41.0 - 51.0 mm Hg    pO2, Cheikh 37.5 30.0 - 50.0 mm Hg    HCO3, Venous 19.8 (L) 22.0 - 29.0 mmol/L    Negative Base Excess, Cheikh 5.6 (H) 0.0 - 2.0 mmol/L    O2 Sat, Cheikh 67.9 60.0 - 85.0 %   ELECTROLYTES PLUS    Collection Time: 24  8:29 AM   Result Value Ref Range    POC Sodium 141 138 - 146 mmol/L    POC Potassium 6.2 (HH) 3.5 - 4.5 mmol/L    POC Chloride 116 (H) 98 - 107 mmol/L    POC TCO2 20 (L) 22 - 30 mmol/L    POC Anion Gap 6 (L) 7 - 16 mmol/L   Hemoglobin and hematocrit, blood    Collection Time: 24  8:29 AM   Result Value Ref Range    POC Hemoglobin (calc) 10.3 (L) 12.0 - 16.0 g/dL    POC  daily  -Resume home medication as tolerated by the patient    # Mood disorder  -Takes Zoloft 25 mg twice daily and BuSpar 5 mg 3 times daily  -Resume home medications        DVT ppx: Eliquis 5 mg twice daily  GI ppx: Not indicated  Diet: No diet orders on file        PT/OT/SW: Patient currently on strict bedrest  Discharge Planning: As per     Maria G Cameron MD  Internal Medicine Resident, PGY-1  [facility]; [place], [place]  3/9/2024, 11:58 AM

## 2024-03-09 NOTE — PROGRESS NOTES
[facility]  Occupational Therapy Not Seen Note    DATE: 3/9/2024    NAME: Criselda Tinoco  MRN: [id number]   : 1950      Patient not seen this date for Occupational Therapy due to:    Other: Per RN pt preparing to go to MRI upon OT arrival. OT will check back tomorrow as able.    Next Scheduled Treatment:   3/10    Electronically signed by ROSIE Valencia on 3/9/2024 at 3:33 PM

## 2024-03-09 NOTE — CONSULTS
Range    Ammonia 30 11 - 51 umol/L   TOX SCR, BLD, ED    Collection Time: 03/09/24  8:45 AM   Result Value Ref Range    Acetaminophen Level <5 (L) 10 - 30 ug/mL    Ethanol <10 <10 mg/dL    Ethanol percent Can not be calculated <0.010 %    Salicylate Lvl <0.5 0.0 - 10.0 mg/dL    Toxic Tricyclic Sc,Blood PENDING NEGATIVE   TSH with Reflex    Collection Time: 03/09/24  8:45 AM   Result Value Ref Range    TSH 1.07 0.27 - 4.20 uIU/mL   Urinalysis with Reflex to Culture    Collection Time: 03/09/24 10:04 AM    Specimen: Urine   Result Value Ref Range    Color, UA Yellow Yellow    Turbidity UA Cloudy (A) Clear    Glucose, Ur NEGATIVE NEGATIVE mg/dL    Bilirubin Urine NEGATIVE NEGATIVE    Ketones, Urine NEGATIVE NEGATIVE mg/dL    Specific Gravity, UA 1.013 1.005 - 1.030    Urine Hgb NEGATIVE NEGATIVE    pH, UA 5.0 5.0 - 8.0    Protein, UA NEGATIVE NEGATIVE mg/dL    Urobilinogen, Urine Normal 0.0 - 1.0 EU/dL    Nitrite, Urine NEGATIVE NEGATIVE    Leukocyte Esterase, Urine NEGATIVE NEGATIVE       CT head without contrast was unremarkable for any hemorrhage or any hypodensity.  Subtle chronic calcification seen in the right temporal region.        MRI brain without contrast pending  MRA head and neck without contrast pending    Assessment :      Primary Problem  <principal problem not specified>    There are no active hospital problems to display for this patient.      Criselda Tinoco is a 74 y.o. female with past medical history of blood clots on Eliquis 5 mg twice daily (last dose last night), hypertension, hyperlipidemia, history of stroke and TIAs (baseline mild aphasia and left-sided facial droop), history of cervical myelopathy with baseline left-sided weakness (left upper extremity 3/5+ contractures and left lower extremity 2/5 contractures) and past surgical history of multiple cervical fusion with Dr. Guy and lumbar fusion currently presenting from [facility] by EMS with complaint of left-sided weakness and aphasia.  NIH 14.      Left sided weakness and aphasia; at baseline rule out stroke recrudesces  Right sided weakness; unclear poor effor  Acute renal failure  Hyperkalemia  Hx of cervical myelopathy and left sided weakness.  Hx of clots; on eliquis     Plan:       Imaging   CT head without contrast was unremarkable for any hemorrhage or any hypodensity.  Subtle chronic calcification seen in the right temporal region.  -CTA could not be performed as she has creatinine of 4 and GFR of 11 on POC lab.  - MRI Brain WO : Pending  - MRA head and neck pending     Medications   Continue Eliquis 5 mg twice daily.  Continue Lipitor 40 mg nightly.  Hold gabapentin until renal function stabilizes.    Recommend continue Eliquis 5 mg twice daily for now.  Patient can be admitted under internal medicine neurology to follow along.      Patient was complaining of pain without any clear localization and was given fentanyl 100 and 10:00 and she became more drowsy and was given Narcan 2 mg for reversal.  On watch for intubation.  Discussed with ED that given her symptoms are baseline and she has worsening renal function with normal blood pressure therefore we can wait until she is stable for MRI brain and MRA head and neck.         Labs  - Fasting Lipid panel  - HgbA1c lab     - PT, OT, Speech eval   - Telemetry   - Neuro checks per protocol  - We recommend SBP Permissive hypertension.  Treat if SBP is greater than 220 or DBP is greater than 110  - Blood glucose goal less than 180  - Please avoid dextrose containing solutions    Consultations:   IP CONSULT TO INTERNAL MEDICINE      Follow-up further recommendations after discussing the case with attending  The plan was discussed with the patient, patient's family and the medical staff.     Patient is admitted as inpatient status because of co-morbidities listed above, severity of signs and symptoms as outlined, requirement for current medical therapies and most importantly because of direct risk to

## 2024-03-09 NOTE — PROGRESS NOTES
[facility]     Emergency/Trauma Note    PATIENT NAME: Criselda Tinoco    Shift date: 03/09/2024  Shift day: Saturday   Shift # 1    Room # 12/12     Name: Criselda Tinoco            Age: 74 y.o.  Gender: female          Anabaptism: Jain   Place of Muslim:     Trauma/Incident type: Race Alert  Admit Date & Time: 3/9/2024  8:18 AM  TRAUMA NAME: None    PATIENT/EVENT DESCRIPTION:  Criselda Tinoco is a 74 y.o. female who arrived ER as race alert. Patient to be admitted to 12/12.       SPIRITUAL ASSESSMENT-INTERVENTION-OUTCOME:  No spiritual assessment was carried out because patient was having a rough time. Family was not present at the time. Per nurse, doctor spoke to family.  provided ministry of presence and prayed at patient's bedside.      PATIENT BELONGINGS:  No belongings noted    ANY BELONGINGS OF SIGNIFICANT VALUE NOTED:  Unknown    REGISTRATION STAFF NOTIFIED?  Yes    WHAT IS YOUR SPIRITUAL CARE PLAN FOR THIS PATIENT?:  Follow up visits recommended for ongoing assessment of patient's condition and for more prayers and support     Electronically signed by Chaplain Barbara, on 3/9/2024 at 11:04 AM.  [facility]  [phone]

## 2024-03-09 NOTE — CODE DOCUMENTATION
Pt in bed screaming and crying.  Continued to try to calm and comfort patient but was not calming down.  Informed attending and pt was ordered 100 mcg of Fentanyl.

## 2024-03-09 NOTE — ED PROVIDER NOTES
[facility] 4B STEPDOWN  Emergency Department Encounter  Emergency Medicine Resident     Pt Name:Criselda Tinoco  MRN: [id number]  Birthdate 1950  Date of evaluation: 3/9/24  PCP:  Wood St MD  Note Started: 4:30 PM EST      CHIEF COMPLAINT       Chief Complaint   Patient presents with    Cerebrovascular Accident       HISTORY OF PRESENT ILLNESS  (Location/Symptom, Timing/Onset, Context/Setting, Quality, Duration, Modifying Factors, Severity.)      Criselda Tinoco is a 74 y.o. female who presents with EMS as a race alert.  Patient woke up around 715 this morning and was found to be confused.  Patient reportedly has known left-sided weakness however now having some right-sided weakness.  Patient also having aphasia, difficulty obtaining further history.  No known falls.  Patient is on Eliquis.  Patient appears very frustrated, difficulty answering questions.  Unable to obtain any further history at this time.    PAST MEDICAL / SURGICAL / SOCIAL / FAMILY HISTORY      has a past medical history of Allergic rhinitis, cause unspecified, Back pain, Bowel obstruction ([facility]), C. difficile diarrhea, CAD (coronary artery disease), Cardiac murmur, Cellulitis, Cerebral artery occlusion with cerebral infarction ([facility]), COVID-19, Diverticulosis of colon (without mention of hemorrhage), GERD (gastroesophageal reflux disease), History of blood transfusion, History of CHF (congestive heart failure), History of MI (myocardial infarction), History of ovarian cyst, History of peritonitis, HTN (hypertension), Hx of blood clots, Hyperlipidemia, Intestinal or peritoneal adhesions with obstruction (postoperative) (postinfection) ([facility]), Kidney infection, Lateral epicondylitis  of elbow, MDRO (multiple drug resistant organisms) resistance, Muscle strain, Other abnormal glucose, PONV (postoperative nausea and vomiting), Pre-diabetes, Restless legs syndrome (RLS), Snores, 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

## 2024-03-10 PROBLEM — Z86.73 HISTORY OF STROKE: Status: ACTIVE | Noted: 2024-03-10

## 2024-03-10 PROBLEM — G93.40 ACUTE ENCEPHALOPATHY: Status: ACTIVE | Noted: 2024-03-10

## 2024-03-10 LAB
ANION GAP SERPL CALCULATED.3IONS-SCNC: 17 MMOL/L (ref 9–16)
BASOPHILS # BLD: 0.04 K/UL (ref 0–0.2)
BASOPHILS NFR BLD: 1 % (ref 0–2)
BUN SERPL-MCNC: 60 MG/DL (ref 8–23)
CALCIUM SERPL-MCNC: 9.1 MG/DL (ref 8.6–10.4)
CHLORIDE SERPL-SCNC: 103 MMOL/L (ref 98–107)
CO2 SERPL-SCNC: 24 MMOL/L (ref 20–31)
CREAT SERPL-MCNC: 1.8 MG/DL (ref 0.5–0.9)
EKG ATRIAL RATE: 22 BPM
EKG Q-T INTERVAL: 360 MS
EKG QRS DURATION: 72 MS
EKG QTC CALCULATION (BAZETT): 382 MS
EKG R AXIS: 35 DEGREES
EKG T AXIS: 53 DEGREES
EKG VENTRICULAR RATE: 68 BPM
EOSINOPHIL # BLD: 0.31 K/UL (ref 0–0.44)
EOSINOPHILS RELATIVE PERCENT: 4 % (ref 1–4)
ERYTHROCYTE [DISTWIDTH] IN BLOOD BY AUTOMATED COUNT: 12.4 % (ref 11.8–14.4)
GFR SERPL CREATININE-BSD FRML MDRD: 29 ML/MIN/1.73M2
GLUCOSE BLD-MCNC: 106 MG/DL (ref 65–105)
GLUCOSE BLD-MCNC: 109 MG/DL (ref 65–105)
GLUCOSE BLD-MCNC: 125 MG/DL (ref 65–105)
GLUCOSE SERPL-MCNC: 162 MG/DL (ref 74–99)
HCT VFR BLD AUTO: 31.5 % (ref 36.3–47.1)
HGB BLD-MCNC: 10.1 G/DL (ref 11.9–15.1)
IMM GRANULOCYTES # BLD AUTO: 0.04 K/UL (ref 0–0.3)
IMM GRANULOCYTES NFR BLD: 1 %
LYMPHOCYTES NFR BLD: 1.33 K/UL (ref 1.1–3.7)
LYMPHOCYTES RELATIVE PERCENT: 17 % (ref 24–43)
MCH RBC QN AUTO: 30.7 PG (ref 25.2–33.5)
MCHC RBC AUTO-ENTMCNC: 32.1 G/DL (ref 28.4–34.8)
MCV RBC AUTO: 95.7 FL (ref 82.6–102.9)
MONOCYTES NFR BLD: 0.59 K/UL (ref 0.1–1.2)
MONOCYTES NFR BLD: 8 % (ref 3–12)
NEUTROPHILS NFR BLD: 71 % (ref 36–65)
NEUTS SEG NFR BLD: 5.58 K/UL (ref 1.5–8.1)
NRBC BLD-RTO: 0 PER 100 WBC
PHOSPHATE SERPL-MCNC: 3.8 MG/DL (ref 2.5–4.5)
PLATELET # BLD AUTO: ABNORMAL K/UL (ref 138–453)
PLATELET, FLUORESCENCE: 235 K/UL (ref 138–453)
PLATELETS.RETICULATED NFR BLD AUTO: 2.7 % (ref 1.1–10.3)
POTASSIUM SERPL-SCNC: 4.6 MMOL/L (ref 3.7–5.3)
RBC # BLD AUTO: 3.29 M/UL (ref 3.95–5.11)
SODIUM SERPL-SCNC: 144 MMOL/L (ref 136–145)
TSH SERPL DL<=0.05 MIU/L-ACNC: 3.47 UIU/ML (ref 0.27–4.2)
WBC OTHER # BLD: 7.9 K/UL (ref 3.5–11.3)

## 2024-03-10 PROCEDURE — 36415 COLL VENOUS BLD VENIPUNCTURE: CPT

## 2024-03-10 PROCEDURE — 85055 RETICULATED PLATELET ASSAY: CPT

## 2024-03-10 PROCEDURE — 93010 ELECTROCARDIOGRAM REPORT: CPT | Performed by: INTERNAL MEDICINE

## 2024-03-10 PROCEDURE — 80048 BASIC METABOLIC PNL TOTAL CA: CPT

## 2024-03-10 PROCEDURE — 97535 SELF CARE MNGMENT TRAINING: CPT

## 2024-03-10 PROCEDURE — 2580000003 HC RX 258: Performed by: INTERNAL MEDICINE

## 2024-03-10 PROCEDURE — 6370000000 HC RX 637 (ALT 250 FOR IP)

## 2024-03-10 PROCEDURE — 2580000003 HC RX 258

## 2024-03-10 PROCEDURE — 1200000000 HC SEMI PRIVATE

## 2024-03-10 PROCEDURE — 84443 ASSAY THYROID STIM HORMONE: CPT

## 2024-03-10 PROCEDURE — APPSS30 APP SPLIT SHARED TIME 16-30 MINUTES: Performed by: NURSE PRACTITIONER

## 2024-03-10 PROCEDURE — 6370000000 HC RX 637 (ALT 250 FOR IP): Performed by: INTERNAL MEDICINE

## 2024-03-10 PROCEDURE — 97167 OT EVAL HIGH COMPLEX 60 MIN: CPT

## 2024-03-10 PROCEDURE — 84100 ASSAY OF PHOSPHORUS: CPT

## 2024-03-10 PROCEDURE — 99232 SBSQ HOSP IP/OBS MODERATE 35: CPT | Performed by: PSYCHIATRY & NEUROLOGY

## 2024-03-10 PROCEDURE — 99232 SBSQ HOSP IP/OBS MODERATE 35: CPT | Performed by: INTERNAL MEDICINE

## 2024-03-10 PROCEDURE — 82947 ASSAY GLUCOSE BLOOD QUANT: CPT

## 2024-03-10 PROCEDURE — 97530 THERAPEUTIC ACTIVITIES: CPT

## 2024-03-10 PROCEDURE — 85025 COMPLETE CBC W/AUTO DIFF WBC: CPT

## 2024-03-10 PROCEDURE — 99233 SBSQ HOSP IP/OBS HIGH 50: CPT | Performed by: INTERNAL MEDICINE

## 2024-03-10 RX ORDER — SODIUM CHLORIDE 450 MG/100ML
INJECTION, SOLUTION INTRAVENOUS CONTINUOUS
Status: DISCONTINUED | OUTPATIENT
Start: 2024-03-10 | End: 2024-03-11

## 2024-03-10 RX ORDER — LANOLIN ALCOHOL/MO/W.PET/CERES
6 CREAM (GRAM) TOPICAL NIGHTLY PRN
Status: DISCONTINUED | OUTPATIENT
Start: 2024-03-10 | End: 2024-03-15 | Stop reason: HOSPADM

## 2024-03-10 RX ORDER — BUSPIRONE HYDROCHLORIDE 10 MG/1
5 TABLET ORAL 3 TIMES DAILY
Status: DISCONTINUED | OUTPATIENT
Start: 2024-03-10 | End: 2024-03-15 | Stop reason: HOSPADM

## 2024-03-10 RX ORDER — SODIUM CHLORIDE 9 MG/ML
INJECTION, SOLUTION INTRAVENOUS CONTINUOUS
Status: DISCONTINUED | OUTPATIENT
Start: 2024-03-10 | End: 2024-03-10

## 2024-03-10 RX ADMIN — APIXABAN 5 MG: 5 TABLET, FILM COATED ORAL at 11:56

## 2024-03-10 RX ADMIN — SODIUM CHLORIDE, PRESERVATIVE FREE 10 ML: 5 INJECTION INTRAVENOUS at 20:32

## 2024-03-10 RX ADMIN — SERTRALINE 25 MG: 25 TABLET, FILM COATED ORAL at 07:48

## 2024-03-10 RX ADMIN — Medication 6 MG: at 20:31

## 2024-03-10 RX ADMIN — BUSPIRONE HYDROCHLORIDE 5 MG: 5 TABLET ORAL at 14:39

## 2024-03-10 RX ADMIN — SODIUM CHLORIDE: 9 INJECTION, SOLUTION INTRAVENOUS at 12:01

## 2024-03-10 RX ADMIN — LABETALOL HYDROCHLORIDE 200 MG: 200 TABLET, FILM COATED ORAL at 20:36

## 2024-03-10 RX ADMIN — SODIUM CHLORIDE: 4.5 INJECTION, SOLUTION INTRAVENOUS at 13:45

## 2024-03-10 RX ADMIN — BUSPIRONE HYDROCHLORIDE 5 MG: 5 TABLET ORAL at 20:31

## 2024-03-10 RX ADMIN — SODIUM CHLORIDE, PRESERVATIVE FREE 10 ML: 5 INJECTION INTRAVENOUS at 11:56

## 2024-03-10 RX ADMIN — DESMOPRESSIN ACETATE 40 MG: 0.2 TABLET ORAL at 07:48

## 2024-03-10 RX ADMIN — BUSPIRONE HYDROCHLORIDE 5 MG: 5 TABLET ORAL at 07:48

## 2024-03-10 RX ADMIN — APIXABAN 5 MG: 5 TABLET, FILM COATED ORAL at 20:31

## 2024-03-10 RX ADMIN — SERTRALINE 25 MG: 25 TABLET, FILM COATED ORAL at 20:35

## 2024-03-10 RX ADMIN — SERTRALINE 25 MG: 25 TABLET, FILM COATED ORAL at 14:40

## 2024-03-10 RX ADMIN — LABETALOL HYDROCHLORIDE 200 MG: 200 TABLET, FILM COATED ORAL at 14:40

## 2024-03-10 RX ADMIN — LABETALOL HYDROCHLORIDE 200 MG: 200 TABLET, FILM COATED ORAL at 05:08

## 2024-03-10 NOTE — PLAN OF CARE
Problem: Discharge Planning  Goal: Discharge to home or other facility with appropriate resources  3/10/2024 1547 by Cecelia Riley RN  Outcome: Progressing  3/10/2024 1104 by Grace Mora RN  Outcome: Progressing  3/10/2024 1101 by Grace Mora RN  Outcome: Progressing     Problem: Pain  Goal: Verbalizes/displays adequate comfort level or baseline comfort level  3/10/2024 1547 by Cecelia Riley RN  Outcome: Progressing  3/10/2024 1104 by Grace Mora RN  Outcome: Progressing  3/10/2024 1101 by Grace Mora RN  Outcome: Progressing     Problem: Skin/Tissue Integrity  Goal: Absence of new skin breakdown  Description: 1.  Monitor for areas of redness and/or skin breakdown  2.  Assess vascular access sites hourly  3.  Every 4-6 hours minimum:  Change oxygen saturation probe site  4.  Every 4-6 hours:  If on nasal continuous positive airway pressure, respiratory therapy assess nares and determine need for appliance change or resting period.  3/10/2024 1547 by Cecelia Riley RN  Outcome: Progressing  3/10/2024 1104 by Grace Mora RN  Outcome: Progressing  3/10/2024 1101 by Grace Mora RN  Outcome: Progressing     Problem: Safety - Adult  Goal: Free from fall injury  3/10/2024 1547 by Cecelia Riley RN  Outcome: Progressing  3/10/2024 1104 by Grace Mora RN  Outcome: Progressing  3/10/2024 1101 by Grace Mora RN  Outcome: Progressing     Problem: ABCDS Injury Assessment  Goal: Absence of physical injury  3/10/2024 1547 by Cecelia Riley RN  Outcome: Progressing  3/10/2024 1104 by Grace Mora RN  Outcome: Progressing  3/10/2024 1101 by Grace Mora RN  Outcome: Progressing     Problem: Chronic Conditions and Co-morbidities  Goal: Patient's chronic conditions and co-morbidity symptoms are monitored and maintained or improved  3/10/2024 1547 by Cecelia Riley RN  Outcome: Progressing  3/10/2024 1104 by Grace Mora RN  Outcome:

## 2024-03-10 NOTE — PROGRESS NOTES
TIBC 360 01/11/2024 10:14 AM     FERRITIN:  No results found for: \"FERRITIN\"  KAILEY:   Lab Results   Component Value Date    KAILEY NEGATIVE 01/25/2018       SPEP:   Lab Results   Component Value Date/Time    PROT 6.6 03/09/2024 03:18 PM    ALBCAL PENDING 03/09/2024 03:18 PM    ALBPCT PENDING 03/09/2024 03:18 PM    LABALPH PENDING 03/09/2024 03:18 PM    LABALPH PENDING 03/09/2024 03:18 PM    A1PCT PENDING 03/09/2024 03:18 PM    A2PCT PENDING 03/09/2024 03:18 PM    BETAPCT PENDING 03/09/2024 03:18 PM    GAMGLOB PENDING 03/09/2024 03:18 PM    GGPCT PENDING 03/09/2024 03:18 PM    PATH PENDING 03/09/2024 09:40 PM     UPEP:   Lab Results   Component Value Date/Time    TPU <4 03/09/2024 09:40 PM      HEPBSAG:  Lab Results   Component Value Date/Time    HEPBSAG NONREACTIVE 03/09/2024 03:18 PM     HEPCAB:  Lab Results   Component Value Date/Time    HEPCAB NONREACTIVE 03/09/2024 03:18 PM     C3:   Lab Results   Component Value Date    C3 154 03/09/2024     C4:   Lab Results   Component Value Date    C4 34 03/09/2024     MPO ANCA: No results found for: \"MPO\" .  PR3 ANCA:  No results found for: \"PR3\"  URINE SODIUM:    Lab Results   Component Value Date/Time    RAISA 126 03/09/2024 09:40 PM      URINE CREATININE:    Lab Results   Component Value Date/Time    LABCREA 27.0 03/09/2024 09:50 PM     URINE EOSINOPHILS:   Lab Results   Component Value Date/Time    UREO NONE SEEN 07/30/2017 04:33 PM     URINE PROTEIN:    Lab Results   Component Value Date/Time    TPU <4 03/09/2024 09:40 PM     URINALYSIS:  U/A:   Lab Results   Component Value Date/Time    NITRU NEGATIVE 03/09/2024 09:40 PM    COLORU Yellow 03/09/2024 09:40 PM    PHUR 5.0 03/09/2024 09:40 PM    WBCUA 0 TO 2 03/09/2024 09:40 PM    RBCUA 2 TO 5 03/09/2024 09:40 PM    MUCUS NOT REPORTED 08/21/2019 10:00 PM    TRICHOMONAS NOT REPORTED 08/21/2019 10:00 PM    YEAST NOT REPORTED 08/21/2019 10:00 PM    BACTERIA None 03/09/2024 09:40 PM    CLARITYU clear 02/16/2022 11:02 AM    SPECGRAV  hour. Patient has a good oral intake.  Baseline creatinine appears to be 0.7-0.9 mg/dl.    Colt Parr MD   Nephrology attending physician  [facility]  3/10/2024

## 2024-03-10 NOTE — PROGRESS NOTES
NEUROLOGY INPATIENT PROGRESS NOTE    3/10/2024         Current Exam:     Chart reviewed. Discussed with RN. Patient is doing well today. Reports being at her baseline. MRI brain with no acute stroke.         Brief History:    Criselda Tinoco is a  74 y.o. female with H/O blood clots on Eliquis, HTN, HLD, history of strokes and TIAs with baseline aphasia and left-sided facial droop, cervical myelopathy with baseline left-sided weakness, who was admitted from [facility] on 3/9/2024 with aphasia and left-sided weakness.  On arrival NIH 14; CT head without any acute intracranial abnormality with subtle chronic calcification seen in the right temporal region.  Creatinine was 4.  CTA head and neck was unable to be performed due to her renal function.  She was found to have significant hyperkalemia 6.5 with acute renal failure.  MRI brain was limited by motion artifact but shows no acute stroke.  MRA head and neck were unremarkable.       No current facility-administered medications on file prior to encounter.     Current Outpatient Medications on File Prior to Encounter   Medication Sig Dispense Refill    atorvastatin (LIPITOR) 40 MG tablet Take 1 tablet by mouth daily      hydrALAZINE (APRESOLINE) 50 MG tablet Take 1 tablet by mouth 3 times daily      lidocaine (LMX) 4 % cream Apply topically as needed for Pain Apply topically as needed.      lisinopril (PRINIVIL;ZESTRIL) 10 MG tablet Take 1 tablet by mouth daily      magnesium hydroxide (MILK OF MAGNESIA) 400 MG/5ML suspension Take by mouth daily as needed for Constipation      sertraline (ZOLOFT) 25 MG tablet Take 1 tablet by mouth in the morning, at noon, and at bedtime      baclofen (LIORESAL) 10 MG tablet Take 1 tablet by mouth 2 times daily 90 tablet 2    potassium chloride (KLOR-CON M) 20 MEQ extended release tablet       oxyCODONE (ROXICODONE) 5 MG immediate release tablet       calcium carb-cholecalciferol 600-10 MG-MCG TABS per tab Take 1 tablet by mouth (Patient not

## 2024-03-10 NOTE — PROGRESS NOTES
03/09/24 2002     --  142 140   K 6.5*  --  5.8* 5.1   *  --  113* 111*   CO2 19*  --  14* 17*   BUN 83*  --  76* 69*   CREATININE 3.4* 3.1* 2.6* 2.2*     BNP: No results for input(s): \"BNP\" in the last 72 hours.  PT/INR:   Recent Labs     03/09/24  0845   PROTIME 18.5*   INR 1.6     APTT:   Recent Labs     03/09/24  0845   APTT 35.8     CARDIAC ENZYMES: No results for input(s): \"CKMB\", \"CKMBINDEX\", \"TROPONINI\" in the last 72 hours.    Invalid input(s): \"CKTOTAL;3\"  FASTING LIPID PANEL:  Lab Results   Component Value Date    CHOL 206 (H) 11/17/2023    HDL 38 (L) 11/17/2023    TRIG 187 (H) 11/17/2023     LIVER PROFILE:   Recent Labs     03/09/24  0845   AST 14   ALT 10   BILIDIR <0.2   BILITOT 0.3   ALKPHOS 39      MICROBIOLOGY:   Lab Results   Component Value Date/Time    CULTURE NO GROWTH 5 DAYS 07/29/2023 10:04 AM       Imaging:    US RENAL LIMITED    Result Date: 3/9/2024  1. Unremarkable ultrasound of the kidneys. 2. Incidental finding of cholelithiasis without CT findings of acute cholecystitis.     MRI BRAIN WO CONTRAST    Result Date: 3/9/2024  Limited by motion artifact. No acute intracranial abnormality. Mild chronic microvascular disease. Unremarkable MRA of the head. Unremarkable MRA of the neck.     MRA HEAD WO CONTRAST    Result Date: 3/9/2024  Limited by motion artifact. No acute intracranial abnormality. Mild chronic microvascular disease. Unremarkable MRA of the head. Unremarkable MRA of the neck.     MRA NECK WO CONTRAST    Result Date: 3/9/2024  Limited by motion artifact. No acute intracranial abnormality. Mild chronic microvascular disease. Unremarkable MRA of the head. Unremarkable MRA of the neck.     XR CHEST PORTABLE    Result Date: 3/9/2024  No radiographic evidence of acute pulmonary disease.     XR CHEST PORTABLE    Result Date: 3/9/2024  1. 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  No other evident metallic foreign bodies in the chest,  left-sided weakness  -Neurology consulted from the ED and recommends MRI and MRI of the brain  -As per neurology, she is not a candidate for TNK and recommends to continue Eliquis  -Neurology is on board.     #Hyperchloremic metabolic acidosis  -CMP shows decreased HCO3 and increased Cl  -IV fluids with NaHCO3  -Follow with repeat BMP     # HFpEF with a last known EF of 55%  -2D echo done on 4/11/2023 shows EF to be around 55% with normal wall thickness no wall motion abnormality.  There was some right atrial dilatation.  -Takes Coreg 25 mg twice daily Lasix 40 mg twice daily at home  -Resume home medications.        # Hx of chronic DVT  -Takes Eliquis 5 mg twice daily at home   -Resume home dose of Eliquis     # Essential hypertension  -Takes hydralazine 50 mg 3 times daily, lisinopril 10 mg OD, amlodipine 5 mg OD, Coreg 25 mg twice daily  -Resume home medication as tolerated by the patient     # Mood disorder  -Takes Zoloft 25 mg twice daily and BuSpar 5 mg 3 times daily  -Resume home medications           DVT ppx: Eliquis 5 mg twice daily  GI ppx: Not indicated  Diet: ADULT DIET; Regular; Low Potassium (Less than 3000 mg/day)             PT/OT/SW: Patient currently on strict bedrest  Discharge Planning: As per     Maria G Cameron MD  Internal Medicine Resident, PGY-1  [facility], [place], [place].  3/10/2024, 7:41 AM

## 2024-03-10 NOTE — PROGRESS NOTES
Occupational Therapy  [facility] [address] [facility]  Occupational Therapy Initial Assessment    Name: Criselda Tinoco  : 1950  MRN: [id number]  Date of Service: 3/10/2024    HPI     \"Criselda Tinoco; 74 y.o. female with past medical history of hypertension, CVA with left-sided residual, DVT, on Eliquis, cervical myelopathy, who was sent from nursing facility this morning secondary to concern of stroke and worsening left-sided weakness.  Per medical report EMS was called after the nurse found patient at 7:15 in the morning with left-sided weakness, aphasia, and facial droop.  During transport to the hospital patient became hypotensive with a blood pressure of 80/50.  IV fluids were started.  She was taken directly to the CT scan.  CT head without contrast was unremarkable for any hemorrhage or any hypodensity. Subtle chronic calcification seen in the right temporal region.  She was not felt to be a candidate for tPA therapy.  Neurology recommending MRI however currently on hold secondary to concerns of metabolic phenomena based on abnormal labs.  Blood pressures on admission 123/95 and currently 155/110.\"    Discharge Recommendations:  Patient would benefit from continued therapy after discharge  OT Equipment Recommendations  Equipment Needed: No       Patient Diagnosis(es): The primary encounter diagnosis was BIN (acute kidney injury) (HCC). Diagnoses of Stroke-like symptom and Aphasia were also pertinent to this visit.  Past Medical History:  has a past medical history of Allergic rhinitis, cause unspecified, Back pain, Bowel obstruction (HCC), C. difficile diarrhea, CAD (coronary artery disease), Cardiac murmur, Cellulitis, Cerebral artery occlusion with cerebral infarction (HCC), COVID-19, Diverticulosis of colon (without mention of hemorrhage), GERD (gastroesophageal reflux disease), History of blood transfusion, History of CHF (congestive heart failure), History of MI (myocardial infarction), History

## 2024-03-10 NOTE — PLAN OF CARE
Problem: Discharge Planning  Goal: Discharge to home or other facility with appropriate resources  3/10/2024 1104 by Grace Mora RN  Outcome: Progressing  3/10/2024 1101 by Grace Mora RN  Outcome: Progressing     Problem: Pain  Goal: Verbalizes/displays adequate comfort level or baseline comfort level  3/10/2024 1104 by Grace Mora RN  Outcome: Progressing  3/10/2024 1101 by Grace Mora RN  Outcome: Progressing     Problem: Skin/Tissue Integrity  Goal: Absence of new skin breakdown  Description: 1.  Monitor for areas of redness and/or skin breakdown  2.  Assess vascular access sites hourly  3.  Every 4-6 hours minimum:  Change oxygen saturation probe site  4.  Every 4-6 hours:  If on nasal continuous positive airway pressure, respiratory therapy assess nares and determine need for appliance change or resting period.  3/10/2024 1104 by Grace Mora RN  Outcome: Progressing  3/10/2024 1101 by Grace Mora RN  Outcome: Progressing     Problem: Safety - Adult  Goal: Free from fall injury  3/10/2024 1104 by Grace Mora RN  Outcome: Progressing  3/10/2024 1101 by Grace Mora RN  Outcome: Progressing     Problem: ABCDS Injury Assessment  Goal: Absence of physical injury  3/10/2024 1104 by Grace Mora RN  Outcome: Progressing  3/10/2024 1101 by Grace Mora RN  Outcome: Progressing     Problem: Chronic Conditions and Co-morbidities  Goal: Patient's chronic conditions and co-morbidity symptoms are monitored and maintained or improved  3/10/2024 1104 by Grace Mora RN  Outcome: Progressing  3/10/2024 1101 by Grace Mora RN  Outcome: Progressing

## 2024-03-10 NOTE — CARE COORDINATION
Case Management Assessment  Initial Evaluation    Date/Time of Evaluation: 3/10/2024 11:20 AM  Assessment Completed by: Kim Palmer RN    If patient is discharged prior to next notation, then this note serves as note for discharge by case management.    Patient Name: Criselda Tinoco                   YOB: 1950  Diagnosis: BIN (acute kidney injury) (HCC) [N17.9]                   Date / Time: 3/9/2024  8:18 AM    Patient Admission Status: Inpatient   Readmission Risk (Low < 19, Mod (19-27), High > 27): Readmission Risk Score: 22.3    Current PCP: Wood [facility] MD  PCP verified by CM?  (SNF MD)    Chart Reviewed: Yes      History Provided by: Child/Family  Patient Orientation: Other (see comment) (disoriented)    Patient Cognition: Other (see comment) (disoriented)    Hospitalization in the last 30 days (Readmission):  No    If yes, Readmission Assessment in  Navigator will be completed.    Advance Directives:      Code Status: Full Code   Patient's Primary Decision Maker is: Legal Next of Kin    Primary Decision Maker: True Tinoco - Spouse - [phone]    Secondary Decision Maker: Lesyl Clark - Child - [phone]    Discharge Planning:    Patient lives with:   Type of Home:    Primary Care Giver: Other (Comment) (SNF)  Patient Support Systems include: Children, Family Members, Spouse/Significant Other   Current Financial resources:    Current community resources:    Current services prior to admission:              Current DME:              Type of Home Care services:       ADLS  Prior functional level: Assistance with the following:, Bathing, Dressing  Current functional level:      PT AM-PAC:   /24  OT AM-PAC:   /24    Family can provide assistance at DC:    Would you like Case Management to discuss the discharge plan with any other family members/significant others, and if so, who?    Plans to Return to Present Housing: Unknown at present  Other Identified Issues/Barriers to RETURNING  Estela RN  Case Management Department  Ph: [phone]

## 2024-03-11 LAB
ALBUMIN PERCENT: 62 % (ref 45–65)
ALBUMIN SERPL-MCNC: 4.1 G/DL (ref 3.2–5.2)
ALPHA 2 PERCENT: 11 % (ref 6–13)
ALPHA1 GLOB SERPL ELPH-MCNC: 0.2 G/DL (ref 0.1–0.4)
ALPHA1 GLOB SERPL ELPH-MCNC: 3 % (ref 3–6)
ALPHA2 GLOB SERPL ELPH-MCNC: 0.8 G/DL (ref 0.5–0.9)
ANA SER QL IA: NEGATIVE
ANCA MYELOPEROXIDASE: <0.3 AU/ML (ref 0–3.5)
ANCA PROTEINASE 3: <0.7 AU/ML (ref 0–2)
ANION GAP SERPL CALCULATED.3IONS-SCNC: 15 MMOL/L (ref 9–16)
B-GLOBULIN SERPL ELPH-MCNC: 0.9 G/DL (ref 0.5–1.1)
B-GLOBULIN SERPL ELPH-MCNC: 13 % (ref 11–19)
BASOPHILS # BLD: 0.04 K/UL (ref 0–0.2)
BASOPHILS NFR BLD: 1 % (ref 0–2)
BUN SERPL-MCNC: 49 MG/DL (ref 8–23)
CALCIUM SERPL-MCNC: 8.7 MG/DL (ref 8.6–10.4)
CHLORIDE SERPL-SCNC: 104 MMOL/L (ref 98–107)
CO2 SERPL-SCNC: 24 MMOL/L (ref 20–31)
CREAT SERPL-MCNC: 1.6 MG/DL (ref 0.5–0.9)
DSDNA IGG SER QL IA: <0.5 IU/ML
EOSINOPHIL # BLD: 0.37 K/UL (ref 0–0.44)
EOSINOPHILS RELATIVE PERCENT: 6 % (ref 1–4)
ERYTHROCYTE [DISTWIDTH] IN BLOOD BY AUTOMATED COUNT: 12.6 % (ref 11.8–14.4)
FREE KAPPA/LAMBDA RATIO: 1.88 (ref 0.22–1.74)
GAMMA GLOB SERPL ELPH-MCNC: 0.8 G/DL (ref 0.5–1.5)
GAMMA GLOBULIN %: 12 % (ref 9–20)
GFR SERPL CREATININE-BSD FRML MDRD: 35 ML/MIN/1.73M2
GLUCOSE BLD-MCNC: 111 MG/DL (ref 65–105)
GLUCOSE BLD-MCNC: 111 MG/DL (ref 65–105)
GLUCOSE BLD-MCNC: 124 MG/DL (ref 65–105)
GLUCOSE BLD-MCNC: 128 MG/DL (ref 65–105)
GLUCOSE SERPL-MCNC: 170 MG/DL (ref 74–99)
HCT VFR BLD AUTO: 28.7 % (ref 36.3–47.1)
HGB BLD-MCNC: 9.5 G/DL (ref 11.9–15.1)
IMM GRANULOCYTES # BLD AUTO: <0.03 K/UL (ref 0–0.3)
IMM GRANULOCYTES NFR BLD: 0 %
INTERPRETATION SERPL IFE-IMP: NORMAL
KAPPA LC FREE SER-MCNC: 58.6 MG/L
LAMBDA LC FREE SERPL-MCNC: 31.2 MG/L (ref 4.2–27.7)
LYMPHOCYTES NFR BLD: 1.63 K/UL (ref 1.1–3.7)
LYMPHOCYTES RELATIVE PERCENT: 28 % (ref 24–43)
MCH RBC QN AUTO: 32.9 PG (ref 25.2–33.5)
MCHC RBC AUTO-ENTMCNC: 33.1 G/DL (ref 28.4–34.8)
MCV RBC AUTO: 99.3 FL (ref 82.6–102.9)
MONOCYTES NFR BLD: 0.57 K/UL (ref 0.1–1.2)
MONOCYTES NFR BLD: 10 % (ref 3–12)
NEUTROPHILS NFR BLD: 55 % (ref 36–65)
NEUTS SEG NFR BLD: 3.22 K/UL (ref 1.5–8.1)
NRBC BLD-RTO: 0 PER 100 WBC
NUCLEAR IGG SER IA-RTO: 0.2 U/ML
PATH REV: NORMAL
PATHOLOGIST: NORMAL
PLATELET # BLD AUTO: 464 K/UL (ref 138–453)
PMV BLD AUTO: 10.4 FL (ref 8.1–13.5)
POTASSIUM SERPL-SCNC: 4.1 MMOL/L (ref 3.7–5.3)
PROT PATTERN SERPL ELPH-IMP: NORMAL
PROT SERPL-MCNC: 6.6 G/DL (ref 6.4–8.3)
RBC # BLD AUTO: 2.89 M/UL (ref 3.95–5.11)
SODIUM SERPL-SCNC: 143 MMOL/L (ref 136–145)
TOTAL PROT. SUM,%: 101 % (ref 98–102)
TOTAL PROT. SUM: 6.8 G/DL (ref 6.3–8.2)
WBC OTHER # BLD: 5.9 K/UL (ref 3.5–11.3)

## 2024-03-11 PROCEDURE — 6370000000 HC RX 637 (ALT 250 FOR IP)

## 2024-03-11 PROCEDURE — 97162 PT EVAL MOD COMPLEX 30 MIN: CPT

## 2024-03-11 PROCEDURE — 93005 ELECTROCARDIOGRAM TRACING: CPT

## 2024-03-11 PROCEDURE — 99232 SBSQ HOSP IP/OBS MODERATE 35: CPT | Performed by: INTERNAL MEDICINE

## 2024-03-11 PROCEDURE — 80048 BASIC METABOLIC PNL TOTAL CA: CPT

## 2024-03-11 PROCEDURE — 36415 COLL VENOUS BLD VENIPUNCTURE: CPT

## 2024-03-11 PROCEDURE — 85025 COMPLETE CBC W/AUTO DIFF WBC: CPT

## 2024-03-11 PROCEDURE — 99232 SBSQ HOSP IP/OBS MODERATE 35: CPT | Performed by: STUDENT IN AN ORGANIZED HEALTH CARE EDUCATION/TRAINING PROGRAM

## 2024-03-11 PROCEDURE — 82947 ASSAY GLUCOSE BLOOD QUANT: CPT

## 2024-03-11 PROCEDURE — 97530 THERAPEUTIC ACTIVITIES: CPT

## 2024-03-11 PROCEDURE — 2580000003 HC RX 258

## 2024-03-11 PROCEDURE — 1200000000 HC SEMI PRIVATE

## 2024-03-11 PROCEDURE — 6370000000 HC RX 637 (ALT 250 FOR IP): Performed by: INTERNAL MEDICINE

## 2024-03-11 RX ORDER — CARVEDILOL 25 MG/1
25 TABLET ORAL 2 TIMES DAILY WITH MEALS
Status: DISCONTINUED | OUTPATIENT
Start: 2024-03-11 | End: 2024-03-15 | Stop reason: HOSPADM

## 2024-03-11 RX ORDER — AMLODIPINE BESYLATE 5 MG/1
5 TABLET ORAL DAILY
Status: DISCONTINUED | OUTPATIENT
Start: 2024-03-11 | End: 2024-03-12

## 2024-03-11 RX ADMIN — APIXABAN 5 MG: 5 TABLET, FILM COATED ORAL at 20:32

## 2024-03-11 RX ADMIN — SERTRALINE 25 MG: 25 TABLET, FILM COATED ORAL at 08:21

## 2024-03-11 RX ADMIN — APIXABAN 5 MG: 5 TABLET, FILM COATED ORAL at 08:20

## 2024-03-11 RX ADMIN — BUSPIRONE HYDROCHLORIDE 5 MG: 5 TABLET ORAL at 20:32

## 2024-03-11 RX ADMIN — DESMOPRESSIN ACETATE 40 MG: 0.2 TABLET ORAL at 08:21

## 2024-03-11 RX ADMIN — Medication 6 MG: at 22:15

## 2024-03-11 RX ADMIN — AMLODIPINE BESYLATE 5 MG: 5 TABLET ORAL at 08:21

## 2024-03-11 RX ADMIN — ACETAMINOPHEN 650 MG: 325 TABLET ORAL at 00:58

## 2024-03-11 RX ADMIN — SERTRALINE 25 MG: 25 TABLET, FILM COATED ORAL at 20:32

## 2024-03-11 RX ADMIN — BUSPIRONE HYDROCHLORIDE 5 MG: 5 TABLET ORAL at 08:21

## 2024-03-11 RX ADMIN — BUSPIRONE HYDROCHLORIDE 5 MG: 5 TABLET ORAL at 15:31

## 2024-03-11 RX ADMIN — LABETALOL HYDROCHLORIDE 200 MG: 200 TABLET, FILM COATED ORAL at 06:26

## 2024-03-11 RX ADMIN — CARVEDILOL 12.5 MG: 12.5 TABLET, FILM COATED ORAL at 08:21

## 2024-03-11 RX ADMIN — SODIUM CHLORIDE, PRESERVATIVE FREE 10 ML: 5 INJECTION INTRAVENOUS at 20:32

## 2024-03-11 RX ADMIN — SERTRALINE 25 MG: 25 TABLET, FILM COATED ORAL at 15:31

## 2024-03-11 RX ADMIN — CARVEDILOL 25 MG: 25 TABLET, FILM COATED ORAL at 17:10

## 2024-03-11 ASSESSMENT — ENCOUNTER SYMPTOMS
RHINORRHEA: 0
NAUSEA: 0
SINUS PRESSURE: 0
DIARRHEA: 0
SINUS PAIN: 0
ABDOMINAL PAIN: 0
SHORTNESS OF BREATH: 0
COUGH: 0
VOMITING: 0

## 2024-03-11 ASSESSMENT — PAIN SCALES - GENERAL
PAINLEVEL_OUTOF10: 0
PAINLEVEL_OUTOF10: 6

## 2024-03-11 ASSESSMENT — PAIN DESCRIPTION - ORIENTATION: ORIENTATION: MID

## 2024-03-11 ASSESSMENT — PAIN DESCRIPTION - LOCATION: LOCATION: HEAD

## 2024-03-11 ASSESSMENT — PAIN DESCRIPTION - DESCRIPTORS: DESCRIPTORS: ACHING

## 2024-03-11 NOTE — PROGRESS NOTES
Renal Progress Note    Patient :  Criselda Tinoco; 74 y.o. MRN# [id number]  Location:  0247/0247-01  Attending:  Loren Diallo MD  Admit Date:  3/9/2024   Hospital Day: 2      Subjective:     Oral intake good.  Urine output good.  Feels well.  Lucero catheter still in place.  IV fluids continue.  Creatinine continues to improve was down to 1.8 yesterday, labs from today pending.  No shortness of breath orthopnea.  Hemodynamically stable.  No fever or chills.  Appetite good according to nursing staff.  Possible discharge to ECF facility soon.  Workup so far for acute kidney injury unremarkable.  Urine sediment benign had 0-2 RBCs.  Ultrasound scan unremarkable.  Serological workup unremarkable.  Patient was on loop diuretics and ACE inhibitors at the [facility] facility    History reviewed  Known history of hypertension, previous history of right CVA with left weakness, resides in an ECF facility.  Also has known history of nonobstructive coronary artery disease.  Presented to the hospital with worsening left-sided weakness aphasia and facial droop.  She was hypotensive on admission admission.  Was found to have a creatinine of 3.4.  Nephrology was consulted.  Workup unremarkable.  CT head and MRI was unremarkable for any acute CVAs.  With hydration renal function improving.  ACE inhibitors and diuretics on hold    Outpatient Medications:     Medications Prior to Admission: atorvastatin (LIPITOR) 40 MG tablet, Take 1 tablet by mouth daily  hydrALAZINE (APRESOLINE) 50 MG tablet, Take 1 tablet by mouth 3 times daily  lidocaine (LMX) 4 % cream, Apply topically as needed for Pain Apply topically as needed.  lisinopril (PRINIVIL;ZESTRIL) 10 MG tablet, Take 1 tablet by mouth daily  magnesium hydroxide (MILK OF MAGNESIA) 400 MG/5ML suspension, Take by mouth daily as needed for Constipation  sertraline (ZOLOFT) 25 MG tablet, Take 1 tablet by mouth in the morning, at noon, and at bedtime  baclofen (LIORESAL) 10 MG tablet, Take 1 tablet  MG TABS tablet, Take 1 tablet by mouth 2 times daily  omeprazole (PRILOSEC) 20 MG delayed release capsule,   melatonin 3 MG TABS tablet, Take 2 tablets by mouth nightly as needed (insomnia)    Current Medications:     Scheduled Meds:    amLODIPine  5 mg Oral Daily    busPIRone  5 mg Oral TID    sertraline  25 mg Oral TID    apixaban  5 mg Oral BID    atorvastatin  40 mg Oral Daily    naloxone  0.4 mg IntraVENous Once    sodium chloride flush  5-40 mL IntraVENous 2 times per day    carvedilol  12.5 mg Oral BID WC     Continuous Infusions:    sodium chloride      dextrose       PRN Meds:  melatonin, sodium chloride flush, sodium chloride, ondansetron **OR** ondansetron, polyethylene glycol, acetaminophen **OR** acetaminophen, labetalol, glucose, dextrose bolus **OR** dextrose bolus, glucagon (rDNA), dextrose    Input/Output:       I/O last 3 completed shifts:  In: 1702.7 [P.O.:840; I.V.:862.7]  Out: 5090 [Urine:5090].    Patient Vitals for the past 96 hrs (Last 3 readings):   Weight   03/10/24 0529 83.8 kg (184 lb 11.9 oz)   03/10/24 0528 83.8 kg (184 lb 11.9 oz)   24 0831 99.8 kg (220 lb)       Vital Signs:   Temperature:  Temp: 97.7 °F (36.5 °C)  TMax:   Temp (24hrs), Av.8 °F (36.6 °C), Min:97.2 °F (36.2 °C), Max:98.2 °F (36.8 °C)    Respirations:  Respirations: 16  Pulse:   Pulse: 75  BP:    BP: (!) 156/82  BP Range: Systolic (24hrs), Av , Min:138 , Max:158       Diastolic (24hrs), Av, Min:58, Max:82      Physical Examination:     General:  Awake, follows commands   HEENT: Atraumatic, normocephalic, no throat congestion, moist mucosa.  Eyes:   Pupils equal, round and reactive to light, EOMI.  Neck:   No JVD, no thyromegaly, no lymphadenopathy.  Chest:  Bilateral vesicular breath sounds, no rales or wheezes.  Cardiac:  S1 S2 RR, no murmurs, gallops or rubs, JVP not raised.  Abdomen: Soft, non-tender, no masses or organomegaly, BS audible.  :   No suprapubic or flank tenderness.  Neuro:  Awake,

## 2024-03-11 NOTE — PLAN OF CARE
Problem: Discharge Planning  Goal: Discharge to home or other facility with appropriate resources  Outcome: Progressing  Flowsheets (Taken 3/11/2024 0646)  Discharge to home or other facility with appropriate resources:   Identify barriers to discharge with patient and caregiver   Arrange for needed discharge resources and transportation as appropriate     Problem: Pain  Goal: Verbalizes/displays adequate comfort level or baseline comfort level  Outcome: Progressing  Flowsheets (Taken 3/11/2024 0646)  Verbalizes/displays adequate comfort level or baseline comfort level:   Encourage patient to monitor pain and request assistance   Assess pain using appropriate pain scale   Administer analgesics based on type and severity of pain and evaluate response     Problem: Skin/Tissue Integrity  Goal: Absence of new skin breakdown  Description: 1.  Monitor for areas of redness and/or skin breakdown  2.  Assess vascular access sites hourly  3.  Every 4-6 hours minimum:  Change oxygen saturation probe site  4.  Every 4-6 hours:  If on nasal continuous positive airway pressure, respiratory therapy assess nares and determine need for appliance change or resting period.  Outcome: Progressing     Problem: Safety - Adult  Goal: Free from fall injury  Flowsheets (Taken 3/11/2024 0646)  Free From Fall Injury: Instruct family/caregiver on patient safety     Problem: ABCDS Injury Assessment  Goal: Absence of physical injury  Outcome: Progressing  Flowsheets (Taken 3/11/2024 0646)  Absence of Physical Injury: Implement safety measures based on patient assessment     Problem: Chronic Conditions and Co-morbidities  Goal: Patient's chronic conditions and co-morbidity symptoms are monitored and maintained or improved  Outcome: Progressing  Flowsheets (Taken 3/11/2024 0646)  Care Plan - Patient's Chronic Conditions and Co-Morbidity Symptoms are Monitored and Maintained or Improved: Monitor and assess patient's chronic conditions and comorbid  symptoms for stability, deterioration, or improvement

## 2024-03-11 NOTE — CARE COORDINATION
Transition planning    Called and spoke with Ginny at [facility], she states patient is able to return to facility and will need precert.

## 2024-03-11 NOTE — PROGRESS NOTES
[facility]  Internal Medicine Teaching Residency Program  Inpatient Daily Progress Note  ______________________________________________________________________________    Patient: Criselda Tinoco  YOB: 1950   MRN:[id number]    Acct: [id number]     Room: 0247/0247-01  Admit date: 3/9/2024  Today's date: 03/11/24  Number of days in the hospital: 2    SUBJECTIVE   Admitting Diagnosis: BIN (acute kidney injury) (HCC)  CC: Aphasia and worsening of left-sided weakness    -Pt seen and examined at bedside. Chart & results reviewed.   -She remained afebrile overnight.  Her blood pressure was high in the morning at 156/82 mmHg.    -She is saturating at 97% on 2 L of oxygen via nasal cannula.  -Waiting for BMP to monitor for BIN and metabolic acidosis.  -She is on regular diet and tolerating it well with no complications.  -Waiting for PT evaluation of the left sided weakness.  -Discussed the patient with RN, no other acute issues noted overnight.        Review of Systems   Constitutional:  Positive for activity change. Negative for chills, fatigue and fever.   HENT:  Negative for congestion, rhinorrhea, sinus pressure and sinus pain.    Respiratory:  Negative for cough and shortness of breath.    Cardiovascular:  Negative for chest pain and palpitations.   Gastrointestinal:  Negative for abdominal pain, diarrhea, nausea and vomiting.   Genitourinary:  Negative for dysuria, frequency and urgency.   Neurological:  Positive for weakness. Negative for tremors, speech difficulty and headaches.   Psychiatric/Behavioral:  Negative for agitation, behavioral problems and confusion. The patient is not nervous/anxious.        BRIEF HISTORY     The patient is a pleasant 74 y.o. female who is a poor historian because of aphasia brought to the ED by the EMS for the chief concern of stroke and worsening of left-sided weakness.  She has a past medical history significant for

## 2024-03-11 NOTE — PLAN OF CARE
Problem: Discharge Planning  Goal: Discharge to home or other facility with appropriate resources  3/11/2024 1602 by Susan Garcia, RN  Outcome: Progressing     Problem: Pain  Goal: Verbalizes/displays adequate comfort level or baseline comfort level  3/11/2024 1602 by Susan Garcia, RN  Outcome: Progressing     Problem: Skin/Tissue Integrity  Goal: Absence of new skin breakdown  Description: 1.  Monitor for areas of redness and/or skin breakdown  2.  Assess vascular access sites hourly  3.  Every 4-6 hours minimum:  Change oxygen saturation probe site  4.  Every 4-6 hours:  If on nasal continuous positive airway pressure, respiratory therapy assess nares and determine need for appliance change or resting period.  3/11/2024 1602 by Susan Garcia, RN  Outcome: Progressing

## 2024-03-11 NOTE — PROGRESS NOTES
Physical Therapy  Facility/Department: [address] ORTHO/MED SURG  Physical Therapy Initial Assessment    Name: Criselda Tinoco  : 1950  MRN: [id number]  Date of Service: 3/11/2024    Discharge Recommendations:  Patient would benefit from continued therapy after discharge          Patient Diagnosis(es): The primary encounter diagnosis was BIN (acute kidney injury) ([facility]). Diagnoses of Stroke-like symptom and Aphasia were also pertinent to this visit.  Past Medical History:  has a past medical history of Allergic rhinitis, cause unspecified, Back pain, Bowel obstruction ([facility]), C. difficile diarrhea, CAD (coronary artery disease), Cardiac murmur, Cellulitis, Cerebral artery occlusion with cerebral infarction ([facility]), COVID-19, Diverticulosis of colon (without mention of hemorrhage), GERD (gastroesophageal reflux disease), History of blood transfusion, History of CHF (congestive heart failure), History of MI (myocardial infarction), History of ovarian cyst, History of peritonitis, HTN (hypertension), Hx of blood clots, Hyperlipidemia, Intestinal or peritoneal adhesions with obstruction (postoperative) (postinfection) ([facility]), Kidney infection, Lateral epicondylitis  of elbow, MDRO (multiple drug resistant organisms) resistance, Muscle strain, Other abnormal glucose, PONV (postoperative nausea and vomiting), Pre-diabetes, Restless legs syndrome (RLS), Snores, 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[facility]), TIA (transient ischemic attack), Under care of service provider, Under care of service provider, Uses walker, Vitamin D deficiency, Wears glasses, and Wellness examination.  Past Surgical History:  has a past surgical history that includes Ovary removal (); colectomy (); Appendectomy (); Ovary removal (); Hysterectomy (); Bunionectomy (Left); sinus surgery (); Colonoscopy; other surgical history (2014); cyst removal (Right); Wrist fracture surgery (Left, 2019); Upper gastrointestinal  tile  Device: Rolling Walker  Distance: sit to stand with a RW x mod assist  Comments: She was unable to take any steps     Balance  Posture: Good  Sitting - Static: Good  Sitting - Dynamic: Fair  Standing - Static: Poor         OutComes Score     AM-PAC - Mobility    AM-PAC Basic Mobility - Inpatient   How much help is needed turning from your back to your side while in a flat bed without using bedrails?: A Lot  How much help is needed moving from lying on your back to sitting on the side of a flat bed without using bedrails?: A Lot  How much help is needed moving to and from a bed to a chair?: A Lot  How much help is needed standing up from a chair using your arms?: A Lot  How much help is needed walking in hospital room?: Total  How much help is needed climbing 3-5 steps with a railing?: Total  AM-PAC Inpatient Mobility Raw Score : 10  AM-PAC Inpatient T-Scale Score : 32.29  Mobility Inpatient CMS 0-100% Score: 76.75  Mobility Inpatient CMS G-Code Modifier : CL         Tinneti Score       Goals  Short Term Goals  Time Frame for Short Term Goals: 10 visits  Short Term Goal 1: supine to sit with min assist  Short Term Goal 2: sit to stand with min assist  Short Term Goal 3: min assist with bed mobility  Short Term Goal 4: 20 min strengthening exercise progrm x SBA       Education  Patient Education  Education Given To: Patient  Education Provided: Role of Therapy;Plan of Care  Education Method: Demonstration;Verbal  Barriers to Learning: None  Education Outcome: Verbalized understanding      Therapy Time   Individual Concurrent Group Co-treatment   Time In 0750         Time Out 0813         Minutes 23             1 of 10    Riaz Lane, PT

## 2024-03-12 LAB
ANION GAP SERPL CALCULATED.3IONS-SCNC: 12 MMOL/L (ref 9–16)
BASOPHILS # BLD: 0.04 K/UL (ref 0–0.2)
BASOPHILS NFR BLD: 1 % (ref 0–2)
BUN SERPL-MCNC: 36 MG/DL (ref 8–23)
CALCIUM SERPL-MCNC: 8.7 MG/DL (ref 8.6–10.4)
CHLORIDE SERPL-SCNC: 109 MMOL/L (ref 98–107)
CO2 SERPL-SCNC: 22 MMOL/L (ref 20–31)
CREAT SERPL-MCNC: 1.3 MG/DL (ref 0.5–0.9)
EKG ATRIAL RATE: 66 BPM
EKG ATRIAL RATE: 69 BPM
EKG ATRIAL RATE: 72 BPM
EKG P AXIS: 56 DEGREES
EKG P AXIS: 57 DEGREES
EKG P AXIS: 58 DEGREES
EKG P-R INTERVAL: 162 MS
EKG P-R INTERVAL: 168 MS
EKG P-R INTERVAL: 170 MS
EKG Q-T INTERVAL: 368 MS
EKG Q-T INTERVAL: 402 MS
EKG Q-T INTERVAL: 410 MS
EKG QRS DURATION: 72 MS
EKG QRS DURATION: 72 MS
EKG QRS DURATION: 84 MS
EKG QTC CALCULATION (BAZETT): 385 MS
EKG QTC CALCULATION (BAZETT): 439 MS
EKG QTC CALCULATION (BAZETT): 440 MS
EKG R AXIS: 20 DEGREES
EKG R AXIS: 27 DEGREES
EKG R AXIS: 27 DEGREES
EKG T AXIS: 24 DEGREES
EKG T AXIS: 39 DEGREES
EKG T AXIS: 54 DEGREES
EKG VENTRICULAR RATE: 66 BPM
EKG VENTRICULAR RATE: 69 BPM
EKG VENTRICULAR RATE: 72 BPM
EOSINOPHIL # BLD: 0.45 K/UL (ref 0–0.44)
EOSINOPHILS RELATIVE PERCENT: 9 % (ref 1–4)
ERYTHROCYTE [DISTWIDTH] IN BLOOD BY AUTOMATED COUNT: 12.6 % (ref 11.8–14.4)
GFR SERPL CREATININE-BSD FRML MDRD: 45 ML/MIN/1.73M2
GLUCOSE SERPL-MCNC: 116 MG/DL (ref 74–99)
HCT VFR BLD AUTO: 31.7 % (ref 36.3–47.1)
HGB BLD-MCNC: 9.6 G/DL (ref 11.9–15.1)
IMM GRANULOCYTES # BLD AUTO: <0.03 K/UL (ref 0–0.3)
IMM GRANULOCYTES NFR BLD: 0 %
LYMPHOCYTES NFR BLD: 1.44 K/UL (ref 1.1–3.7)
LYMPHOCYTES RELATIVE PERCENT: 29 % (ref 24–43)
MCH RBC QN AUTO: 30.6 PG (ref 25.2–33.5)
MCHC RBC AUTO-ENTMCNC: 30.3 G/DL (ref 28.4–34.8)
MCV RBC AUTO: 101 FL (ref 82.6–102.9)
MONOCYTES NFR BLD: 0.41 K/UL (ref 0.1–1.2)
MONOCYTES NFR BLD: 8 % (ref 3–12)
NEUTROPHILS NFR BLD: 52 % (ref 36–65)
NEUTS SEG NFR BLD: 2.57 K/UL (ref 1.5–8.1)
NRBC BLD-RTO: 0 PER 100 WBC
P E INTERPRETATION, U: NORMAL
PATHOLOGIST: NORMAL
PLATELET # BLD AUTO: 304 K/UL (ref 138–453)
PMV BLD AUTO: 9.8 FL (ref 8.1–13.5)
POTASSIUM SERPL-SCNC: 4 MMOL/L (ref 3.7–5.3)
RBC # BLD AUTO: 3.14 M/UL (ref 3.95–5.11)
SODIUM SERPL-SCNC: 143 MMOL/L (ref 136–145)
SPECIMEN TYPE: NORMAL
TROPONIN I SERPL HS-MCNC: 30 NG/L (ref 0–14)
URINE TOTAL PROTEIN: <4 MG/DL
WBC OTHER # BLD: 4.9 K/UL (ref 3.5–11.3)

## 2024-03-12 PROCEDURE — 6370000000 HC RX 637 (ALT 250 FOR IP)

## 2024-03-12 PROCEDURE — 93010 ELECTROCARDIOGRAM REPORT: CPT | Performed by: INTERNAL MEDICINE

## 2024-03-12 PROCEDURE — 84484 ASSAY OF TROPONIN QUANT: CPT

## 2024-03-12 PROCEDURE — 99232 SBSQ HOSP IP/OBS MODERATE 35: CPT | Performed by: INTERNAL MEDICINE

## 2024-03-12 PROCEDURE — 80048 BASIC METABOLIC PNL TOTAL CA: CPT

## 2024-03-12 PROCEDURE — 1200000000 HC SEMI PRIVATE

## 2024-03-12 PROCEDURE — 93005 ELECTROCARDIOGRAM TRACING: CPT | Performed by: STUDENT IN AN ORGANIZED HEALTH CARE EDUCATION/TRAINING PROGRAM

## 2024-03-12 PROCEDURE — 85025 COMPLETE CBC W/AUTO DIFF WBC: CPT

## 2024-03-12 PROCEDURE — 99232 SBSQ HOSP IP/OBS MODERATE 35: CPT | Performed by: STUDENT IN AN ORGANIZED HEALTH CARE EDUCATION/TRAINING PROGRAM

## 2024-03-12 PROCEDURE — 2580000003 HC RX 258

## 2024-03-12 PROCEDURE — 36415 COLL VENOUS BLD VENIPUNCTURE: CPT

## 2024-03-12 RX ORDER — AMLODIPINE BESYLATE 10 MG/1
10 TABLET ORAL DAILY
Qty: 30 TABLET | Refills: 3 | DISCHARGE
Start: 2024-03-13

## 2024-03-12 RX ORDER — AMLODIPINE BESYLATE 10 MG/1
10 TABLET ORAL DAILY
Status: DISCONTINUED | OUTPATIENT
Start: 2024-03-12 | End: 2024-03-15 | Stop reason: HOSPADM

## 2024-03-12 RX ADMIN — SODIUM CHLORIDE, PRESERVATIVE FREE 10 ML: 5 INJECTION INTRAVENOUS at 08:32

## 2024-03-12 RX ADMIN — SODIUM CHLORIDE, PRESERVATIVE FREE 10 ML: 5 INJECTION INTRAVENOUS at 20:41

## 2024-03-12 RX ADMIN — BUSPIRONE HYDROCHLORIDE 5 MG: 5 TABLET ORAL at 20:41

## 2024-03-12 RX ADMIN — DESMOPRESSIN ACETATE 40 MG: 0.2 TABLET ORAL at 08:32

## 2024-03-12 RX ADMIN — SERTRALINE 25 MG: 25 TABLET, FILM COATED ORAL at 20:41

## 2024-03-12 RX ADMIN — CARVEDILOL 25 MG: 25 TABLET, FILM COATED ORAL at 16:38

## 2024-03-12 RX ADMIN — SERTRALINE 25 MG: 25 TABLET, FILM COATED ORAL at 14:36

## 2024-03-12 RX ADMIN — ACETAMINOPHEN 650 MG: 325 TABLET ORAL at 00:12

## 2024-03-12 RX ADMIN — CARVEDILOL 25 MG: 25 TABLET, FILM COATED ORAL at 08:32

## 2024-03-12 RX ADMIN — SERTRALINE 25 MG: 25 TABLET, FILM COATED ORAL at 08:32

## 2024-03-12 RX ADMIN — Medication 6 MG: at 22:20

## 2024-03-12 RX ADMIN — AMLODIPINE BESYLATE 10 MG: 10 TABLET ORAL at 08:32

## 2024-03-12 RX ADMIN — BUSPIRONE HYDROCHLORIDE 5 MG: 5 TABLET ORAL at 14:36

## 2024-03-12 RX ADMIN — APIXABAN 5 MG: 5 TABLET, FILM COATED ORAL at 20:41

## 2024-03-12 RX ADMIN — APIXABAN 5 MG: 5 TABLET, FILM COATED ORAL at 08:32

## 2024-03-12 RX ADMIN — BUSPIRONE HYDROCHLORIDE 5 MG: 5 TABLET ORAL at 08:32

## 2024-03-12 ASSESSMENT — PAIN SCALES - GENERAL
PAINLEVEL_OUTOF10: 0
PAINLEVEL_OUTOF10: 3
PAINLEVEL_OUTOF10: 0

## 2024-03-12 ASSESSMENT — ENCOUNTER SYMPTOMS
COUGH: 0
SINUS PRESSURE: 0
VOMITING: 0
DIARRHEA: 0
RHINORRHEA: 0
NAUSEA: 0
ABDOMINAL PAIN: 0
SINUS PAIN: 0
SHORTNESS OF BREATH: 0

## 2024-03-12 ASSESSMENT — PAIN SCALES - WONG BAKER
WONGBAKER_NUMERICALRESPONSE: 0
WONGBAKER_NUMERICALRESPONSE: NO HURT
WONGBAKER_NUMERICALRESPONSE: 0
WONGBAKER_NUMERICALRESPONSE: 0
WONGBAKER_NUMERICALRESPONSE: NO HURT
WONGBAKER_NUMERICALRESPONSE: 0

## 2024-03-12 ASSESSMENT — PAIN DESCRIPTION - ORIENTATION: ORIENTATION: LEFT

## 2024-03-12 ASSESSMENT — PAIN DESCRIPTION - LOCATION: LOCATION: EAR

## 2024-03-12 ASSESSMENT — PAIN DESCRIPTION - DESCRIPTORS: DESCRIPTORS: DISCOMFORT

## 2024-03-12 NOTE — PROGRESS NOTES
Renal Progress Note    Patient :  Criselda Tinoco; 74 y.o. MRN# [id number]  Location:  0247/0247-01  Attending:  Loren Diallo MD  Admit Date:  3/9/2024   Hospital Day: 3      Subjective:     Oral intake good.  Urine output good.  Feels well.  Lucero catheter removed.  IV fluids discontinued.  Creatinine continues to improve, is down to 1.3 now.  No shortness of breath orthopnea.  Hemodynamically stable.  No fever or chills.  Appetite good according to nursing staff.  Possible discharge to ECF facility today.  Workup so far for acute kidney injury unremarkable.  Urine sediment benign had 0-2 RBCs.  Ultrasound scan unremarkable.  Serological workup unremarkable.  Patient was on loop diuretics and ACE inhibitors at the [facility] facility    History reviewed  Known history of hypertension, previous history of right CVA with left weakness, resides in an ECF facility.  Also has known history of nonobstructive coronary artery disease.  Presented to the hospital with worsening left-sided weakness aphasia and facial droop.  She was hypotensive on admission admission.  Was found to have a creatinine of 3.4.  Nephrology was consulted.  Workup unremarkable.  CT head and MRI was unremarkable for any acute CVAs.  With hydration renal function improving.  ACE inhibitors and diuretics on hold    Outpatient Medications:     Medications Prior to Admission: atorvastatin (LIPITOR) 40 MG tablet, Take 1 tablet by mouth daily  hydrALAZINE (APRESOLINE) 50 MG tablet, Take 1 tablet by mouth 3 times daily  lidocaine (LMX) 4 % cream, Apply topically as needed for Pain Apply topically as needed.  lisinopril (PRINIVIL;ZESTRIL) 10 MG tablet, Take 1 tablet by mouth daily  magnesium hydroxide (MILK OF MAGNESIA) 400 MG/5ML suspension, Take by mouth daily as needed for Constipation  sertraline (ZOLOFT) 25 MG tablet, Take 1 tablet by mouth in the morning, at noon, and at bedtime  baclofen (LIORESAL) 10 MG tablet, Take 1 tablet by mouth 2 times daily  potassium

## 2024-03-12 NOTE — DISCHARGE INSTR - COC
Continuity of Care Form    Patient Name: Criselda Tinoco   :  1950  MRN:  [id number]    Admit date:  3/9/2024  Discharge date:  3-15-24    Code Status Order: Full Code   Advance Directives:     Admitting Physician:  No admitting provider for patient encounter.  PCP: [address] MD    Discharging Nurse: Ruma  Discharging Hospital Unit/Room#: 0247/0247-01  Discharging Unit Phone Number: [phone]    Emergency Contact:   Extended Emergency Contact Information  Primary Emergency Contact: EarnestTrue  Address: [address] [place] [address]  [address]           [address]  Home Phone: [phone]  Mobile Phone: [phone]  Relation: Spouse  Hearing or visual needs: None  Other needs: None  Preferred language: English   needed? No  Secondary Emergency Contact: Lesly Clark  Home Phone: [phone]  Mobile Phone: [phone]  Relation: Child    Past Surgical History:  Past Surgical History:   Procedure Laterality Date    ABDOMEN SURGERY      benign tumor removed near remaining ovary, 1.5 pounds    APPENDECTOMY      appendix ruptured, developed peritonitis    BACK SURGERY      BUNIONECTOMY Left     along with calcium deposits removed    CARDIAC CATHETERIZATION      negative    CERVICAL FUSION N/A 2021    POSTERIOR C3-6 LAMINECTOMY, PARTIAL C7 LAMINECTOMY, FUSION C3-C6, SILVERCORD performed by Malena Guy DO at [facility] OR    CERVICAL FUSION N/A 2023    ANTERIOR CERVICAL DISCECTOMY, OSTEOTOMY, FUSION,  C6-7, CORRECTION OF DEFORMITY. performed by Malena Guy DO at [facility] OR    CERVICAL FUSION N/A 2023    POSTERIOR CERVICAL OSTEOTOMY C6-7, REMOVAL OF PREVIOUS HARDWARE, CERVICAL FUSION C2-T2, CORRECTION OF DEFORMITY. performed by Malena Guy DO at [facility] OR    COLECTOMY  1969    12 INCHES REMOVED D/T OBSTRUCTION    COLONOSCOPY      CYST REMOVAL Right     right facial    FINGER CLOSED REDUCTION Left 2022    CLOSED REDUCTION PINNING LEFT LITTLE FINGER      I/O last 3 completed shifts:  In: -   Out: 1850 [Urine:1850]    Safety Concerns:     At Risk for Falls    Impairments/Disabilities:      Vision and hx of CVA-L side weakness    Nutrition Therapy:  Current Nutrition Therapy:   General diet    Routes of Feeding: Oral  Liquids: No Restrictions  Daily Fluid Restriction: no  Last Modified Barium Swallow with Video (Video Swallowing Test): not done    Treatments at the Time of Hospital Discharge:   Respiratory Treatments: None  Oxygen Therapy:  is not on home oxygen therapy.  Ventilator:    - No ventilator support    Rehab Therapies: Physical Therapy and Occupational Therapy  Weight Bearing Status/Restrictions: No weight bearing restrictions  Other Medical Equipment (for information only, NOT a DME order):  walker  Other Treatments: None    Patient's personal belongings (please select all that are sent with patient):  Glasses    RN SIGNATURE:  Electronically signed by Susan Garcia RN on 3/12/24 at 11:35 AM EDT    CASE MANAGEMENT/SOCIAL WORK SECTION    Inpatient Status Date: 3-9-2024    Readmission Risk Assessment Score:  Readmission Risk              Risk of Unplanned Readmission:  28           Discharging to Facility/ Agency   Name: [facility]  Address: [address] [place] [address]  Phone: [phone]  Fax: [phone]      / signature: Electronically signed by Marita Dia RN on 3/13/24 at 3:09 PM EDT    PHYSICIAN SECTION    Prognosis: Fair    Condition at Discharge: Stable    Rehab Potential (if transferring to Rehab): Good    Recommended Labs or Other Treatments After Discharge: BMP in 1 week    Physician Certification: I certify the above information and transfer of Criselda Tinoco  is necessary for the continuing treatment of the diagnosis listed and that she requires Skilled Nursing Facility for greater 30 days.     Update Admission H&P: No change in H&P    PHYSICIAN SIGNATURE:  Electronically signed by Loren Diallo MD on 3/12/24 at

## 2024-03-12 NOTE — CARE COORDINATION
Transition planning    Authorization submitted per [facility]. Auth ID [id number], status is pending.

## 2024-03-12 NOTE — PROGRESS NOTES
cervical kyphosis, s/p cervical spine fusion for acute neck deformity in 2023, hypertension, hyperlipidemia, Hx of MDRO and Hx of chronic DVT on Eliquis.     On arrival to the ED, patient was found to have NIH score of 14 with worsening of the left-sided weakness.  She came in from [facility] by the nurse on the patient with left-sided weakness, aphasia and facial droop.  During the transport, patient became hypotensive with a blood pressure of 80/50 mg again she received IV fluids.  On arrival, neurology saw the patient and recommended to do the dry CT scan of the head which did not show any acute hemorrhage or abnormality.  Neurology ordered MRA and MRI of the head which could not be done because patient was anxious.  Patient received some fentanyl for the agitation and after that she became alert to obtunded and received Narcan which improved her mentation.  Patient not a good historian because she keeps on repeating that she had a stroke and she is unable to answer most of the questions.     In the ED, her CMP was concerning for hyperlipidemia at 6.5, bicarbonate 19, chloride 110, BUN 83 and creatinine 3.4.  Her BUN/creatinine ratio is concerning for an BIN.  Her total CK is 44 and myoglobin 85.  Her troponin is elevated at 51.  Her CBC showed WBC 5.9, hemoglobin 10, hematocrit 32.1 and greater count of 295.     Patient is admitted to medicine for further evaluation and management of BIN and concern for stroke in the setting of prior left-sided weakness.    OBJECTIVE     Vital Signs:  BP (!) 168/49   Pulse 75   Temp 98.7 °F (37.1 °C) (Oral)   Resp 16   Ht 1.676 m (5' 6\")   Wt 83.8 kg (184 lb 11.9 oz)   SpO2 97%   BMI 29.82 kg/m²     Temp (24hrs), Av.3 °F (36.8 °C), Min:97.8 °F (36.6 °C), Max:98.7 °F (37.1 °C)    In: -   Out: 1100 [Urine:1100]    Physical Exam:  Constitutional:       General: She is in acute distress.      Appearance: She is obese. She is ill-appearing and  left-sided weakness  # Hx of prior CVA with residual left-sided weakness  -Neurology consulted from the ED and recommends MRI and MRI of the brain  -As per neurology, she is not a candidate for TNK and recommends to continue Eliquis  -Neurology signed off, neurological deficit improved, outpatient follow-up     #Hyperchloremic metabolic acidosis- Improving  -Initial CMP showed decreased HCO3 and increased Cl  -Continue IV fluids  -Follow with repeat BMP     # HFpEF with a last known EF of 55%  -2D echo done on 4/11/2023 shows EF to be around 55% with normal wall thickness no wall motion abnormality.  There was some right atrial dilatation.  -Takes Coreg 25 mg twice daily Lasix 40 mg twice daily at home  -Resume home medications.        # Hx of chronic DVT  -Takes Eliquis 5 mg twice daily at home   -Resume home dose of Eliquis     # Essential hypertension  -Takes hydralazine 50 mg 3 times daily, lisinopril 10 mg OD, amlodipine 5 mg OD, Coreg 25 mg twice daily  -Resume home medication as tolerated by the patient     # Mood disorder  -Takes Zoloft 25 mg twice daily and BuSpar 5 mg 3 times daily  -Resume home medications           DVT ppx: Eliquis 5 mg twice daily  GI ppx: Not indicated  Diet: ADULT DIET; Regular; Low Potassium (Less than 3000 mg/day)             PT/OT/SW: Patient currently on strict bedrest  Discharge Planning: As per     Husam Best MD  Internal Medicine Resident, PGY-2  [facility], [place], [place].  3/12/2024, 9:29 AM

## 2024-03-12 NOTE — DISCHARGE INSTRUCTIONS
You were here for confusion and severe kidney injury  Neurology evaluated you inpatient, your neurological evaluations were negative during hospitalization  Follow-up with neurology outpatient  Your severe kidney injury subsequently improved during hospitalization and now you are ready for discharge  We are discontinuing your lisinopril and potassium chloride supplement from your home medication list  Follow-up with nephrology in 1 week in the office with repeat BMP and discuss about starting lisinopril and potassium chloride again  Your Norvasc dose has been increased from 5 to 10 mg  Take medication as prescribed  Return to the ED if symptoms worsen  Lasix has been held

## 2024-03-12 NOTE — PROGRESS NOTES
Pt reported new onset chest pain with some dizziness. BP: 141 HR: 70. Internal med resident notified and ordered an EKG. While EKG was being performed, pt reported that she no longer is having chest pain and is not dizzy anymore.   EKG completed and physician notified.

## 2024-03-13 LAB
ANION GAP SERPL CALCULATED.3IONS-SCNC: 13 MMOL/L (ref 9–17)
BASOPHILS # BLD: 0.08 K/UL (ref 0–0.2)
BASOPHILS NFR BLD: 1 % (ref 0–2)
BUN SERPL-MCNC: 32 MG/DL (ref 8–23)
CALCIUM SERPL-MCNC: 8.8 MG/DL (ref 8.6–10.4)
CHLORIDE SERPL-SCNC: 111 MMOL/L (ref 98–107)
CO2 SERPL-SCNC: 21 MMOL/L (ref 20–31)
CREAT SERPL-MCNC: 1.1 MG/DL (ref 0.5–0.9)
EOSINOPHIL # BLD: 0.53 K/UL (ref 0–0.44)
EOSINOPHILS RELATIVE PERCENT: 7 % (ref 1–4)
ERYTHROCYTE [DISTWIDTH] IN BLOOD BY AUTOMATED COUNT: 12.6 % (ref 11.8–14.4)
GFR SERPL CREATININE-BSD FRML MDRD: 51 ML/MIN/1.73M2
GLUCOSE SERPL-MCNC: 108 MG/DL (ref 74–99)
HCT VFR BLD AUTO: 33.1 % (ref 36.3–47.1)
HGB BLD-MCNC: 10.2 G/DL (ref 11.9–15.1)
IMM GRANULOCYTES # BLD AUTO: <0.03 K/UL (ref 0–0.3)
IMM GRANULOCYTES NFR BLD: 0 %
LYMPHOCYTES NFR BLD: 1.72 K/UL (ref 1.1–3.7)
LYMPHOCYTES RELATIVE PERCENT: 23 % (ref 24–43)
MCH RBC QN AUTO: 30.8 PG (ref 25.2–33.5)
MCHC RBC AUTO-ENTMCNC: 30.8 G/DL (ref 28.4–34.8)
MCV RBC AUTO: 100 FL (ref 82.6–102.9)
MONOCYTES NFR BLD: 0.53 K/UL (ref 0.1–1.2)
MONOCYTES NFR BLD: 7 % (ref 3–12)
NEUTROPHILS NFR BLD: 61 % (ref 36–65)
NEUTS SEG NFR BLD: 4.59 K/UL (ref 1.5–8.1)
NRBC BLD-RTO: 0 PER 100 WBC
PLATELET # BLD AUTO: 302 K/UL (ref 138–453)
PMV BLD AUTO: 9.4 FL (ref 8.1–13.5)
POTASSIUM SERPL-SCNC: 4 MMOL/L (ref 3.7–5.3)
RBC # BLD AUTO: 3.31 M/UL (ref 3.95–5.11)
SODIUM SERPL-SCNC: 145 MMOL/L (ref 136–145)
WBC OTHER # BLD: 7.5 K/UL (ref 3.5–11.3)

## 2024-03-13 PROCEDURE — 97535 SELF CARE MNGMENT TRAINING: CPT

## 2024-03-13 PROCEDURE — 2580000003 HC RX 258

## 2024-03-13 PROCEDURE — 6370000000 HC RX 637 (ALT 250 FOR IP)

## 2024-03-13 PROCEDURE — 1200000000 HC SEMI PRIVATE

## 2024-03-13 PROCEDURE — 97110 THERAPEUTIC EXERCISES: CPT

## 2024-03-13 PROCEDURE — 80048 BASIC METABOLIC PNL TOTAL CA: CPT

## 2024-03-13 PROCEDURE — 85025 COMPLETE CBC W/AUTO DIFF WBC: CPT

## 2024-03-13 PROCEDURE — 97530 THERAPEUTIC ACTIVITIES: CPT

## 2024-03-13 PROCEDURE — 36415 COLL VENOUS BLD VENIPUNCTURE: CPT

## 2024-03-13 PROCEDURE — 99232 SBSQ HOSP IP/OBS MODERATE 35: CPT | Performed by: STUDENT IN AN ORGANIZED HEALTH CARE EDUCATION/TRAINING PROGRAM

## 2024-03-13 RX ORDER — HYDRALAZINE HYDROCHLORIDE 50 MG/1
50 TABLET, FILM COATED ORAL EVERY 8 HOURS SCHEDULED
Status: DISCONTINUED | OUTPATIENT
Start: 2024-03-13 | End: 2024-03-14

## 2024-03-13 RX ORDER — GABAPENTIN 100 MG/1
200 CAPSULE ORAL 2 TIMES DAILY
Status: DISCONTINUED | OUTPATIENT
Start: 2024-03-13 | End: 2024-03-15 | Stop reason: HOSPADM

## 2024-03-13 RX ADMIN — BUSPIRONE HYDROCHLORIDE 5 MG: 5 TABLET ORAL at 08:11

## 2024-03-13 RX ADMIN — APIXABAN 5 MG: 5 TABLET, FILM COATED ORAL at 21:03

## 2024-03-13 RX ADMIN — AMLODIPINE BESYLATE 10 MG: 10 TABLET ORAL at 08:11

## 2024-03-13 RX ADMIN — Medication 6 MG: at 21:03

## 2024-03-13 RX ADMIN — SERTRALINE 25 MG: 25 TABLET, FILM COATED ORAL at 21:03

## 2024-03-13 RX ADMIN — BUSPIRONE HYDROCHLORIDE 5 MG: 5 TABLET ORAL at 21:03

## 2024-03-13 RX ADMIN — DESMOPRESSIN ACETATE 40 MG: 0.2 TABLET ORAL at 08:11

## 2024-03-13 RX ADMIN — HYDRALAZINE HYDROCHLORIDE 50 MG: 50 TABLET ORAL at 21:03

## 2024-03-13 RX ADMIN — CARVEDILOL 25 MG: 25 TABLET, FILM COATED ORAL at 08:11

## 2024-03-13 RX ADMIN — HYDRALAZINE HYDROCHLORIDE 50 MG: 50 TABLET ORAL at 08:11

## 2024-03-13 RX ADMIN — SODIUM CHLORIDE, PRESERVATIVE FREE 10 ML: 5 INJECTION INTRAVENOUS at 08:16

## 2024-03-13 RX ADMIN — CARVEDILOL 25 MG: 25 TABLET, FILM COATED ORAL at 17:21

## 2024-03-13 RX ADMIN — ACETAMINOPHEN 650 MG: 325 TABLET ORAL at 18:51

## 2024-03-13 RX ADMIN — SERTRALINE 25 MG: 25 TABLET, FILM COATED ORAL at 15:20

## 2024-03-13 RX ADMIN — GABAPENTIN 200 MG: 100 CAPSULE ORAL at 15:20

## 2024-03-13 RX ADMIN — ACETAMINOPHEN 650 MG: 325 TABLET ORAL at 11:14

## 2024-03-13 RX ADMIN — ACETAMINOPHEN 650 MG: 325 TABLET ORAL at 23:27

## 2024-03-13 RX ADMIN — APIXABAN 5 MG: 5 TABLET, FILM COATED ORAL at 08:11

## 2024-03-13 RX ADMIN — SERTRALINE 25 MG: 25 TABLET, FILM COATED ORAL at 08:12

## 2024-03-13 RX ADMIN — SODIUM CHLORIDE, PRESERVATIVE FREE 10 ML: 5 INJECTION INTRAVENOUS at 21:04

## 2024-03-13 RX ADMIN — BUSPIRONE HYDROCHLORIDE 5 MG: 5 TABLET ORAL at 15:20

## 2024-03-13 RX ADMIN — GABAPENTIN 200 MG: 100 CAPSULE ORAL at 21:03

## 2024-03-13 RX ADMIN — HYDRALAZINE HYDROCHLORIDE 50 MG: 50 TABLET ORAL at 15:20

## 2024-03-13 ASSESSMENT — PAIN DESCRIPTION - LOCATION
LOCATION: LEG

## 2024-03-13 ASSESSMENT — PAIN DESCRIPTION - ORIENTATION
ORIENTATION: LEFT;LOWER
ORIENTATION: RIGHT;LEFT
ORIENTATION: LEFT;LOWER

## 2024-03-13 ASSESSMENT — PAIN DESCRIPTION - DESCRIPTORS
DESCRIPTORS: CRAMPING
DESCRIPTORS: ACHING

## 2024-03-13 ASSESSMENT — ENCOUNTER SYMPTOMS
ABDOMINAL PAIN: 0
RHINORRHEA: 0
NAUSEA: 0
DIARRHEA: 0
SHORTNESS OF BREATH: 0
SINUS PAIN: 0
SINUS PRESSURE: 0
VOMITING: 0
COUGH: 0

## 2024-03-13 ASSESSMENT — PAIN SCALES - GENERAL
PAINLEVEL_OUTOF10: 0
PAINLEVEL_OUTOF10: 3
PAINLEVEL_OUTOF10: 5
PAINLEVEL_OUTOF10: 0
PAINLEVEL_OUTOF10: 0
PAINLEVEL_OUTOF10: 5

## 2024-03-13 NOTE — PROGRESS NOTES
Physical Therapy  Facility/Department: [address] ORTHO/MED SURG  Physical Therapy treatment    Name: Criselda Tinoco  : 1950  MRN: [id number]  Date of Service: 3/13/2024    Discharge Recommendations:  Further therapy recommended at discharge.     PT Equipment Recommendations  Other: Pt requires RW to safetly attempt ambulation with skilled assistance. Pt unsafe to attempt any ambulation without skilled assistance      Patient Diagnosis(es): The primary encounter diagnosis was BIN (acute kidney injury) ([facility]). Diagnoses of Stroke-like symptom, Aphasia, and Acute encephalopathy were also pertinent to this visit.  Past Medical History:  has a past medical history of Allergic rhinitis, cause unspecified, Back pain, Bowel obstruction ([facility]), C. difficile diarrhea, CAD (coronary artery disease), Cardiac murmur, Cellulitis, Cerebral artery occlusion with cerebral infarction ([facility]), COVID-19, Diverticulosis of colon (without mention of hemorrhage), GERD (gastroesophageal reflux disease), History of blood transfusion, History of CHF (congestive heart failure), History of MI (myocardial infarction), History of ovarian cyst, History of peritonitis, HTN (hypertension), Hx of blood clots, Hyperlipidemia, Intestinal or peritoneal adhesions with obstruction (postoperative) (postinfection) ([facility]), Kidney infection, Lateral epicondylitis  of elbow, MDRO (multiple drug resistant organisms) resistance, Muscle strain, Other abnormal glucose, PONV (postoperative nausea and vomiting), Pre-diabetes, Restless legs syndrome (RLS), Snores, 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[facility]), TIA (transient ischemic attack), Under care of service provider, Under care of service provider, Uses walker, Vitamin D deficiency, Wears glasses, and Wellness examination.  Past Surgical History:  has a past surgical history that includes Ovary removal (); colectomy (); Appendectomy (); Ovary removal (); Hysterectomy (); Bunionectomy  Jennifer, intermttant cues to correct posterior lean with good response, poor carry through.        AM-PAC - Mobility    AM-PAC Basic Mobility - Inpatient   How much help is needed turning from your back to your side while in a flat bed without using bedrails?: A Lot  How much help is needed moving from lying on your back to sitting on the side of a flat bed without using bedrails?: A Lot  How much help is needed moving to and from a bed to a chair?: A Lot  How much help is needed standing up from a chair using your arms?: A Lot  How much help is needed walking in hospital room?: Total  How much help is needed climbing 3-5 steps with a railing?: Total  AM-PAC Inpatient Mobility Raw Score : 10  AM-PAC Inpatient T-Scale Score : 32.29  Mobility Inpatient CMS 0-100% Score: 76.75  Mobility Inpatient CMS G-Code Modifier : CL     Goals  Short Term Goals  Time Frame for Short Term Goals: 10 visits  Short Term Goal 1: supine to sit with min assist  Short Term Goal 2: sit to stand with min assist  Short Term Goal 3: min assist with bed mobility  Short Term Goal 4: 20 min strengthening exercise progrm x SBA       Education  Patient Education  Education Given To: Patient  Education Provided: Role of Therapy;Plan of Care  Education Method: Demonstration;Verbal  Barriers to Learning: None  Education Outcome: Verbalized understanding;Demonstrated understanding      Therapy Time   Individual Concurrent Group Co-treatment   Time In 0856         Time Out 0924         Minutes 28         Timed Code Treatment Minutes: 25 Minutes       Pilo Thomas, PT

## 2024-03-13 NOTE — CARE COORDINATION
Received call from [facility], they are asking for a peer to peer for skilled care at [place]. Number to call is [phone] option 5. This needs to be completed before  10:30 am tomorrow (3-14). PS peer to peer information to Dr. KYLEIGH Garvey.

## 2024-03-13 NOTE — PROGRESS NOTES
[facility]  Internal Medicine Teaching Residency Program  Inpatient Daily Progress Note  ______________________________________________________________________________    Patient: Criselda Tinoco  YOB: 1950   MRN:[id number]    Acct: [id number]     Room: 0247/0247-01  Admit date: 3/9/2024  Today's date: 03/13/24  Number of days in the hospital: 4    SUBJECTIVE   Admitting Diagnosis: BIN (acute kidney injury) (HCC)  CC: Aphasia and worsening of left-sided weakness    -Pt seen and examined at bedside. Chart & results reviewed.   -She remained afebrile overnight.  Her blood pressure remains to be on the higher side with SBP  180s in the morning.   -She is saturating well on room air right now  -She is on regular diet and tolerating it well with no complications.  -She is able to move her left side better than before. Patient is working with PT, planning to discharge to [place] pending authorization approval.  -Discussed the patient with RN, no other acute issues noted overnight.        Review of Systems   Constitutional:  Positive for activity change. Negative for chills, fatigue and fever.   HENT:  Negative for congestion, rhinorrhea, sinus pressure and sinus pain.    Respiratory:  Negative for cough and shortness of breath.    Cardiovascular:  Negative for chest pain and palpitations.   Gastrointestinal:  Negative for abdominal pain, diarrhea, nausea and vomiting.   Genitourinary:  Negative for dysuria, frequency and urgency.   Neurological:  Positive for weakness. Negative for tremors, speech difficulty and headaches.   Psychiatric/Behavioral:  Negative for agitation, behavioral problems and confusion. The patient is not nervous/anxious.        BRIEF HISTORY     The patient is a pleasant 74 y.o. female who is a poor historian because of aphasia brought to the ED by the EMS for the chief concern of stroke and worsening of left-sided weakness.  She has a past  Patient was on potassium supplements at home.   -Received 2 doses of regular insulin and dextrose while inpatient  -Monitor electrolytes daily with CMP        # Aphasia and left-sided weakness  # Hx of prior CVA with residual left-sided weakness  -Neurology consulted from the ED and recommends MRI and MRI of the brain  -As per neurology, she is not a candidate for TNK and recommends to continue Eliquis  -Neurology signed off, neurological deficit improved, outpatient follow-up     #Hyperchloremic metabolic acidosis- Resolved     # HFpEF with a last known EF of 55%  -2D echo done on 4/11/2023 shows EF to be around 55% with normal wall thickness no wall motion abnormality.  There was some right atrial dilatation.  -Takes Coreg 25 mg twice daily Lasix 40 mg twice daily at home  -Resume home medications but hold Lasix as per Nephrology.        # Hx of chronic DVT  -Takes Eliquis 5 mg twice daily at home   -Resume home dose of Eliquis     # Essential hypertension  -Takes hydralazine 50 mg 3 times daily, lisinopril 10 mg OD, amlodipine 5 mg OD, Coreg 25 mg twice daily  -Resume home medication as tolerated by the patient     # Mood disorder  -Takes Zoloft 25 mg twice daily and BuSpar 5 mg 3 times daily  -Resume home medications           DVT ppx: Eliquis 5 mg twice daily  GI ppx: Not indicated  Diet: ADULT DIET; Regular; Low Potassium (Less than 3000 mg/day)             PT/OT/SW: Patient currently on strict bedrest  Discharge Planning: As per     Maria G Cameron MD  Internal Medicine Resident, PGY-1  [facility], [place], [place].  3/13/2024, 7:42 AM

## 2024-03-13 NOTE — PROGRESS NOTES
No  Bed Mobility Training  Bed Mobility Training: Yes (Bed rail, HOB elevated.)  Overall Level of Assistance: Moderate assistance;Additional time  Interventions: Verbal cues (Cues for tech and hand placement. Increased time.)  Supine to Sit: Minimum assistance;Assist X1;Additional time  Sit to Supine: Moderate assistance;Assist X1  Scooting: Moderate assistance;Assist X1;Additional time  Balance  Sitting:  (Static/dynamic activity, sitting EOB, ~20 minutes, while engaged in grooming, lateral weight shifting, WB via L UE elbow/hand. R UE AROM x 10 reps 1 set, L UE A/PROM x 10 reps, 1 set to improve strength for ADL activity.)  Standing:  (Not tested d/t fatigue.)  Transfer Training  Transfer Training: No (Not tested d/t fatigue.)        ADL  Grooming: Setup;Increased time to complete;Contact guard assistance;Stand by assistance  Grooming Skilled Clinical Factors: Sitting EOB, set up on tray table. SBA-CGA to ensure stability. Pt. completed action of brushing teeth/washing face and combing hair with increased time and effort after set up.  Additional Comments: Pt. declined bathe/dress, d/t planed bathe coming up with staff this am. Pt. demo G tolerance to grooming activity.                 Cognition  Overall Cognitive Status: WFL  Orientation  Overall Orientation Status: Within Functional Limits                  Education Given To: Patient  Education Provided: Role of Therapy;Plan of Care;Transfer Training  Education Provided Comments: Lateral weight shifting while sitting, UE strengthening, bed mobility- G carryover.  Education Method: Verbal  Barriers to Learning: None  Education Outcome: Verbalized understanding;Continued education needed                                                         AM-PAC - ADL  AM-PAC Daily Activity - Inpatient   How much help is needed for putting on and taking off regular lower body clothing?: A Lot  How much help is needed for bathing (which includes washing, rinsing, drying)?: A  Lot  How much help is needed for toileting (which includes using toilet, bedpan, or urinal)?: A Lot  How much help is needed for putting on and taking off regular upper body clothing?: A Lot  How much help is needed for taking care of personal grooming?: A Little  How much help for eating meals?: A Little  AM-[facility] Inpatient Daily Activity Raw Score: 14  AM-PAC Inpatient ADL T-Scale Score : 33.39  ADL Inpatient CMS 0-100% Score: 59.67  ADL Inpatient CMS G-Code Modifier : CK           Goals  Short Term Goals  Time Frame for Short Term Goals: By discharge; Pt will  Short Term Goal 1: Complete grooming/feeding SBA with modified techniques as needed  Short Term Goal 2: Complete UB bathing/dressing Min A with AE/DME/modified techniques  Short Term Goal 3: Demo Good safety throughout session without cuing  Short Term Goal 4: Complete functional transfers Mod A with LRD  Short Term Goal 5: Maintain sitting balance 5+ mins CGA to engage in functional tasks       Therapy Time   Individual Concurrent Group Co-treatment   Time In 0932         Time Out 1002         Minutes 30         Timed Code Treatment Minutes: 30 Minutes       JONI Muller/CÉSAR

## 2024-03-14 LAB
ANION GAP SERPL CALCULATED.3IONS-SCNC: 13 MMOL/L (ref 9–16)
ANION GAP SERPL CALCULATED.3IONS-SCNC: 13 MMOL/L (ref 9–16)
BASOPHILS # BLD: 0.07 K/UL (ref 0–0.2)
BASOPHILS NFR BLD: 1 % (ref 0–2)
BUN SERPL-MCNC: 43 MG/DL (ref 8–23)
BUN SERPL-MCNC: 44 MG/DL (ref 8–23)
CALCIUM SERPL-MCNC: 8.4 MG/DL (ref 8.6–10.4)
CALCIUM SERPL-MCNC: 8.8 MG/DL (ref 8.6–10.4)
CHLORIDE SERPL-SCNC: 111 MMOL/L (ref 98–107)
CHLORIDE SERPL-SCNC: 113 MMOL/L (ref 98–107)
CO2 SERPL-SCNC: 18 MMOL/L (ref 20–31)
CO2 SERPL-SCNC: 20 MMOL/L (ref 20–31)
CREAT SERPL-MCNC: 1.3 MG/DL (ref 0.5–0.9)
CREAT SERPL-MCNC: 1.6 MG/DL (ref 0.5–0.9)
EOSINOPHIL # BLD: 0.49 K/UL (ref 0–0.44)
EOSINOPHILS RELATIVE PERCENT: 8 % (ref 1–4)
ERYTHROCYTE [DISTWIDTH] IN BLOOD BY AUTOMATED COUNT: 12.8 % (ref 11.8–14.4)
GFR SERPL CREATININE-BSD FRML MDRD: 35 ML/MIN/1.73M2
GFR SERPL CREATININE-BSD FRML MDRD: 45 ML/MIN/1.73M2
GLUCOSE SERPL-MCNC: 107 MG/DL (ref 74–99)
GLUCOSE SERPL-MCNC: 116 MG/DL (ref 74–99)
HCT VFR BLD AUTO: 36.3 % (ref 36.3–47.1)
HGB BLD-MCNC: 10.4 G/DL (ref 11.9–15.1)
IMM GRANULOCYTES # BLD AUTO: 0.03 K/UL (ref 0–0.3)
IMM GRANULOCYTES NFR BLD: 1 %
LYMPHOCYTES NFR BLD: 1.71 K/UL (ref 1.1–3.7)
LYMPHOCYTES RELATIVE PERCENT: 27 % (ref 24–43)
MCH RBC QN AUTO: 31 PG (ref 25.2–33.5)
MCHC RBC AUTO-ENTMCNC: 28.7 G/DL (ref 28.4–34.8)
MCV RBC AUTO: 108.4 FL (ref 82.6–102.9)
MONOCYTES NFR BLD: 0.56 K/UL (ref 0.1–1.2)
MONOCYTES NFR BLD: 9 % (ref 3–12)
NEUTROPHILS NFR BLD: 54 % (ref 36–65)
NEUTS SEG NFR BLD: 3.39 K/UL (ref 1.5–8.1)
NRBC BLD-RTO: 0 PER 100 WBC
PLATELET # BLD AUTO: 296 K/UL (ref 138–453)
PMV BLD AUTO: 9.6 FL (ref 8.1–13.5)
POTASSIUM SERPL-SCNC: 4 MMOL/L (ref 3.7–5.3)
POTASSIUM SERPL-SCNC: 4.1 MMOL/L (ref 3.7–5.3)
RBC # BLD AUTO: 3.35 M/UL (ref 3.95–5.11)
RBC # BLD: ABNORMAL 10*6/UL
SODIUM SERPL-SCNC: 144 MMOL/L (ref 136–145)
SODIUM SERPL-SCNC: 144 MMOL/L (ref 136–145)
WBC OTHER # BLD: 6.3 K/UL (ref 3.5–11.3)

## 2024-03-14 PROCEDURE — 6370000000 HC RX 637 (ALT 250 FOR IP)

## 2024-03-14 PROCEDURE — 1200000000 HC SEMI PRIVATE

## 2024-03-14 PROCEDURE — 6370000000 HC RX 637 (ALT 250 FOR IP): Performed by: INTERNAL MEDICINE

## 2024-03-14 PROCEDURE — 2580000003 HC RX 258: Performed by: INTERNAL MEDICINE

## 2024-03-14 PROCEDURE — 99232 SBSQ HOSP IP/OBS MODERATE 35: CPT | Performed by: INTERNAL MEDICINE

## 2024-03-14 PROCEDURE — 99232 SBSQ HOSP IP/OBS MODERATE 35: CPT | Performed by: STUDENT IN AN ORGANIZED HEALTH CARE EDUCATION/TRAINING PROGRAM

## 2024-03-14 PROCEDURE — 80048 BASIC METABOLIC PNL TOTAL CA: CPT

## 2024-03-14 PROCEDURE — 2580000003 HC RX 258

## 2024-03-14 PROCEDURE — 85025 COMPLETE CBC W/AUTO DIFF WBC: CPT

## 2024-03-14 PROCEDURE — 36415 COLL VENOUS BLD VENIPUNCTURE: CPT

## 2024-03-14 RX ORDER — HYDRALAZINE HYDROCHLORIDE 25 MG/1
25 TABLET, FILM COATED ORAL EVERY 8 HOURS SCHEDULED
Status: DISCONTINUED | OUTPATIENT
Start: 2024-03-14 | End: 2024-03-15 | Stop reason: HOSPADM

## 2024-03-14 RX ADMIN — CARVEDILOL 25 MG: 25 TABLET, FILM COATED ORAL at 09:41

## 2024-03-14 RX ADMIN — SERTRALINE 25 MG: 25 TABLET, FILM COATED ORAL at 19:44

## 2024-03-14 RX ADMIN — SERTRALINE 25 MG: 25 TABLET, FILM COATED ORAL at 15:22

## 2024-03-14 RX ADMIN — GABAPENTIN 200 MG: 100 CAPSULE ORAL at 19:44

## 2024-03-14 RX ADMIN — SODIUM CHLORIDE: 9 INJECTION, SOLUTION INTRAVENOUS at 11:18

## 2024-03-14 RX ADMIN — AMLODIPINE BESYLATE 10 MG: 10 TABLET ORAL at 09:40

## 2024-03-14 RX ADMIN — BUSPIRONE HYDROCHLORIDE 5 MG: 5 TABLET ORAL at 15:22

## 2024-03-14 RX ADMIN — DESMOPRESSIN ACETATE 40 MG: 0.2 TABLET ORAL at 09:40

## 2024-03-14 RX ADMIN — ACETAMINOPHEN 650 MG: 325 TABLET ORAL at 19:44

## 2024-03-14 RX ADMIN — BUSPIRONE HYDROCHLORIDE 5 MG: 5 TABLET ORAL at 09:40

## 2024-03-14 RX ADMIN — Medication 6 MG: at 19:44

## 2024-03-14 RX ADMIN — SODIUM CHLORIDE, PRESERVATIVE FREE 10 ML: 5 INJECTION INTRAVENOUS at 19:48

## 2024-03-14 RX ADMIN — GABAPENTIN 200 MG: 100 CAPSULE ORAL at 09:41

## 2024-03-14 RX ADMIN — SODIUM CHLORIDE, PRESERVATIVE FREE 10 ML: 5 INJECTION INTRAVENOUS at 09:41

## 2024-03-14 RX ADMIN — BUSPIRONE HYDROCHLORIDE 5 MG: 5 TABLET ORAL at 19:44

## 2024-03-14 RX ADMIN — CARVEDILOL 25 MG: 25 TABLET, FILM COATED ORAL at 18:34

## 2024-03-14 RX ADMIN — HYDRALAZINE HYDROCHLORIDE 25 MG: 25 TABLET, FILM COATED ORAL at 21:09

## 2024-03-14 RX ADMIN — ACETAMINOPHEN 650 MG: 325 TABLET ORAL at 05:15

## 2024-03-14 RX ADMIN — APIXABAN 5 MG: 5 TABLET, FILM COATED ORAL at 21:09

## 2024-03-14 RX ADMIN — SERTRALINE 25 MG: 25 TABLET, FILM COATED ORAL at 09:41

## 2024-03-14 RX ADMIN — HYDRALAZINE HYDROCHLORIDE 50 MG: 50 TABLET ORAL at 05:15

## 2024-03-14 RX ADMIN — APIXABAN 5 MG: 5 TABLET, FILM COATED ORAL at 09:40

## 2024-03-14 ASSESSMENT — ENCOUNTER SYMPTOMS
ABDOMINAL PAIN: 0
SHORTNESS OF BREATH: 0
RHINORRHEA: 0
COUGH: 0
VOMITING: 0
NAUSEA: 0
DIARRHEA: 0
SINUS PRESSURE: 0
SINUS PAIN: 0

## 2024-03-14 ASSESSMENT — PAIN DESCRIPTION - LOCATION: LOCATION: LEG

## 2024-03-14 ASSESSMENT — PAIN DESCRIPTION - PAIN TYPE: TYPE: CHRONIC PAIN

## 2024-03-14 ASSESSMENT — PAIN DESCRIPTION - ORIENTATION: ORIENTATION: RIGHT;LEFT

## 2024-03-14 ASSESSMENT — PAIN SCALES - GENERAL: PAINLEVEL_OUTOF10: 2

## 2024-03-14 NOTE — PROGRESS NOTES
[facility]  Internal Medicine Teaching Residency Program  Inpatient Daily Progress Note  ______________________________________________________________________________    Patient: Criselda Tinoco  YOB: 1950   MRN:[id number]    Acct: [id number]     Room: 0247/0247-01  Admit date: 3/9/2024  Today's date: 03/14/24  Number of days in the hospital: 5    SUBJECTIVE   Admitting Diagnosis: BIN (acute kidney injury) (HCC)  CC: Aphasia and worsening of left-sided weakness    -Pt seen and examined at bedside. Chart & results reviewed.   -She remained afebrile and hemodynamically stable overnight.    -She is saturating well on room air right now  -She is on regular diet and tolerating it well with no complications.  -She is able to move her left side better than before. Patient is working with PT, planning to discharge to [place] pending authorization approval.  -Discussed the patient with RN, no other acute issues noted overnight.        Review of Systems   Constitutional:  Positive for activity change. Negative for chills, fatigue and fever.   HENT:  Negative for congestion, rhinorrhea, sinus pressure and sinus pain.    Respiratory:  Negative for cough and shortness of breath.    Cardiovascular:  Negative for chest pain and palpitations.   Gastrointestinal:  Negative for abdominal pain, diarrhea, nausea and vomiting.   Genitourinary:  Negative for dysuria, frequency and urgency.   Neurological:  Positive for weakness. Negative for tremors, speech difficulty and headaches.   Psychiatric/Behavioral:  Negative for agitation, behavioral problems and confusion. The patient is not nervous/anxious.        BRIEF HISTORY     The patient is a pleasant 74 y.o. female who is a poor historian because of aphasia brought to the ED by the EMS for the chief concern of stroke and worsening of left-sided weakness.  She has a past medical history significant for nonobstructive CAD, Hx of

## 2024-03-14 NOTE — PROGRESS NOTES
Renal Progress Note    Patient :  Criselda Tinoco; 74 y.o. MRN# [id number]  Location:  0247/0247-01  Attending:  Loren Diallo MD  Admit Date:  3/9/2024   Hospital Day: 5      Subjective:     Reconsulted for bump in creatinine.  Apparently has had been having difficulty urinating.  Put on mL of urine.  Tells me she cannot empty her bladder completely.  She also had some fluctuation of blood pressure.  Creatinine this morning was 1.6.  Baseline 1.2-1.3.  Oral intake otherwise good.  Hemodynamically stable.  Discharge planning in progress.  Urine sediment benign.  Ultrasound unremarkable  Serological workup unremarkable.  Patient is not on loop diuretics or ACE inhibitors.  Labs today show sodium 144 potassium 4.1 chloride 113 bicarb 18 BUN 43 creatinine 1.6 calcium 8.8    History reviewed  Known history of hypertension, previous history of right CVA with left weakness, resides in an ECF facility.  Also has known history of nonobstructive coronary artery disease.  Presented to the hospital with worsening left-sided weakness aphasia and facial droop.  She was hypotensive on admission admission.  Was found to have a creatinine of 3.4.  Nephrology was consulted.  Workup unremarkable.  CT head and MRI was unremarkable for any acute CVAs.  With hydration renal function improving.  ACE inhibitors and diuretics on hold    Outpatient Medications:     Medications Prior to Admission: atorvastatin (LIPITOR) 40 MG tablet, Take 1 tablet by mouth daily  hydrALAZINE (APRESOLINE) 50 MG tablet, Take 1 tablet by mouth 3 times daily  lidocaine (LMX) 4 % cream, Apply topically as needed for Pain Apply topically as needed.  magnesium hydroxide (MILK OF MAGNESIA) 400 MG/5ML suspension, Take by mouth daily as needed for Constipation  sertraline (ZOLOFT) 25 MG tablet, Take 1 tablet by mouth in the morning, at noon, and at bedtime  baclofen (LIORESAL) 10 MG tablet, Take 1 tablet by mouth 2 times daily  [DISCONTINUED] lisinopril (PRINIVIL;ZESTRIL)  tablet by mouth 3 times daily  apixaban (ELIQUIS) 5 MG TABS tablet, Take 1 tablet by mouth 2 times daily  omeprazole (PRILOSEC) 20 MG delayed release capsule,   melatonin 3 MG TABS tablet, Take 2 tablets by mouth nightly as needed (insomnia)    Current Medications:     Scheduled Meds:    hydrALAZINE  25 mg Oral 3 times per day    gabapentin  200 mg Oral BID    amLODIPine  10 mg Oral Daily    carvedilol  25 mg Oral BID WC    busPIRone  5 mg Oral TID    sertraline  25 mg Oral TID    apixaban  5 mg Oral BID    atorvastatin  40 mg Oral Daily    sodium chloride flush  5-40 mL IntraVENous 2 times per day     Continuous Infusions:    sodium chloride      dextrose       PRN Meds:  melatonin, sodium chloride flush, sodium chloride, ondansetron **OR** ondansetron, polyethylene glycol, acetaminophen **OR** acetaminophen, labetalol, glucose, dextrose bolus **OR** dextrose bolus, glucagon (rDNA), dextrose    Input/Output:       I/O last 3 completed shifts:  In: -   Out: 150 [Urine:150].    Patient Vitals for the past 96 hrs (Last 3 readings):   Weight   24 0600 83.2 kg (183 lb 6.8 oz)         Vital Signs:   Temperature:  Temp: 97.4 °F (36.3 °C)  TMax:   Temp (24hrs), Av.7 °F (36.5 °C), Min:97.4 °F (36.3 °C), Max:98.2 °F (36.8 °C)    Respirations:  Respirations: 15  Pulse:   Pulse: 73  BP:    BP: (!) 127/50  BP Range: Systolic (24hrs), Av , Min:119 , Max:149       Diastolic (24hrs), Av, Min:43, Max:129      Physical Examination:     General:  Awake, follows commands appears to be uncomfortable  HEENT: Atraumatic, normocephalic, no throat congestion, moist mucosa.  Eyes:   Pupils equal, round and reactive to light, EOMI.  Neck:   No JVD, no thyromegaly, no lymphadenopathy.  Chest:  Bilateral vesicular breath sounds, no rales or wheezes.  Cardiac:  S1 S2 RR, no murmurs, gallops or rubs, JVP not raised.  Abdomen: Soft, non-tender, no masses or organomegaly, BS audible.  :   No suprapubic or flank

## 2024-03-15 VITALS
BODY MASS INDEX: 31.68 KG/M2 | SYSTOLIC BLOOD PRESSURE: 149 MMHG | WEIGHT: 197.09 LBS | TEMPERATURE: 98.1 F | HEIGHT: 66 IN | DIASTOLIC BLOOD PRESSURE: 50 MMHG | RESPIRATION RATE: 17 BRPM | HEART RATE: 70 BPM | OXYGEN SATURATION: 96 %

## 2024-03-15 LAB
ANION GAP SERPL CALCULATED.3IONS-SCNC: 11 MMOL/L (ref 9–16)
BASOPHILS # BLD: 0.08 K/UL (ref 0–0.2)
BASOPHILS NFR BLD: 1 % (ref 0–2)
BUN SERPL-MCNC: 43 MG/DL (ref 8–23)
CALCIUM SERPL-MCNC: 8.4 MG/DL (ref 8.6–10.4)
CHLORIDE SERPL-SCNC: 114 MMOL/L (ref 98–107)
CO2 SERPL-SCNC: 19 MMOL/L (ref 20–31)
CREAT SERPL-MCNC: 1.2 MG/DL (ref 0.5–0.9)
EOSINOPHIL # BLD: 0.65 K/UL (ref 0–0.44)
EOSINOPHILS RELATIVE PERCENT: 9 % (ref 1–4)
ERYTHROCYTE [DISTWIDTH] IN BLOOD BY AUTOMATED COUNT: 12.8 % (ref 11.8–14.4)
GFR SERPL CREATININE-BSD FRML MDRD: 49 ML/MIN/1.73M2
GLUCOSE SERPL-MCNC: 106 MG/DL (ref 74–99)
HCT VFR BLD AUTO: 32.4 % (ref 36.3–47.1)
HGB BLD-MCNC: 9.9 G/DL (ref 11.9–15.1)
IMM GRANULOCYTES # BLD AUTO: 0.04 K/UL (ref 0–0.3)
IMM GRANULOCYTES NFR BLD: 1 %
LYMPHOCYTES NFR BLD: 1.97 K/UL (ref 1.1–3.7)
LYMPHOCYTES RELATIVE PERCENT: 29 % (ref 24–43)
MCH RBC QN AUTO: 31.3 PG (ref 25.2–33.5)
MCHC RBC AUTO-ENTMCNC: 30.6 G/DL (ref 28.4–34.8)
MCV RBC AUTO: 102.5 FL (ref 82.6–102.9)
MONOCYTES NFR BLD: 0.53 K/UL (ref 0.1–1.2)
MONOCYTES NFR BLD: 8 % (ref 3–12)
NEUTROPHILS NFR BLD: 53 % (ref 36–65)
NEUTS SEG NFR BLD: 3.64 K/UL (ref 1.5–8.1)
NRBC BLD-RTO: 0 PER 100 WBC
PLATELET # BLD AUTO: 289 K/UL (ref 138–453)
PMV BLD AUTO: 9.5 FL (ref 8.1–13.5)
POTASSIUM SERPL-SCNC: 4 MMOL/L (ref 3.7–5.3)
RBC # BLD AUTO: 3.16 M/UL (ref 3.95–5.11)
SODIUM SERPL-SCNC: 144 MMOL/L (ref 136–145)
WBC OTHER # BLD: 6.9 K/UL (ref 3.5–11.3)

## 2024-03-15 PROCEDURE — 99232 SBSQ HOSP IP/OBS MODERATE 35: CPT | Performed by: STUDENT IN AN ORGANIZED HEALTH CARE EDUCATION/TRAINING PROGRAM

## 2024-03-15 PROCEDURE — 85025 COMPLETE CBC W/AUTO DIFF WBC: CPT

## 2024-03-15 PROCEDURE — 2580000003 HC RX 258

## 2024-03-15 PROCEDURE — 6370000000 HC RX 637 (ALT 250 FOR IP)

## 2024-03-15 PROCEDURE — 6370000000 HC RX 637 (ALT 250 FOR IP): Performed by: STUDENT IN AN ORGANIZED HEALTH CARE EDUCATION/TRAINING PROGRAM

## 2024-03-15 PROCEDURE — 80048 BASIC METABOLIC PNL TOTAL CA: CPT

## 2024-03-15 PROCEDURE — 36415 COLL VENOUS BLD VENIPUNCTURE: CPT

## 2024-03-15 PROCEDURE — 97530 THERAPEUTIC ACTIVITIES: CPT

## 2024-03-15 PROCEDURE — 6370000000 HC RX 637 (ALT 250 FOR IP): Performed by: INTERNAL MEDICINE

## 2024-03-15 PROCEDURE — 99232 SBSQ HOSP IP/OBS MODERATE 35: CPT | Performed by: INTERNAL MEDICINE

## 2024-03-15 RX ORDER — SODIUM BICARBONATE 650 MG/1
650 TABLET ORAL 2 TIMES DAILY
Status: DISCONTINUED | OUTPATIENT
Start: 2024-03-15 | End: 2024-03-15 | Stop reason: HOSPADM

## 2024-03-15 RX ORDER — HYDRALAZINE HYDROCHLORIDE 25 MG/1
25 TABLET, FILM COATED ORAL 3 TIMES DAILY
Qty: 90 TABLET | Refills: 2 | DISCHARGE
Start: 2024-03-15

## 2024-03-15 RX ORDER — SODIUM BICARBONATE 650 MG/1
650 TABLET ORAL 2 TIMES DAILY
Qty: 60 TABLET | Refills: 1 | DISCHARGE
Start: 2024-03-15

## 2024-03-15 RX ADMIN — BUSPIRONE HYDROCHLORIDE 5 MG: 5 TABLET ORAL at 08:18

## 2024-03-15 RX ADMIN — BUSPIRONE HYDROCHLORIDE 5 MG: 5 TABLET ORAL at 14:31

## 2024-03-15 RX ADMIN — SODIUM BICARBONATE 650 MG: 648 TABLET ORAL at 10:24

## 2024-03-15 RX ADMIN — SERTRALINE 25 MG: 25 TABLET, FILM COATED ORAL at 14:31

## 2024-03-15 RX ADMIN — GABAPENTIN 200 MG: 100 CAPSULE ORAL at 08:18

## 2024-03-15 RX ADMIN — AMLODIPINE BESYLATE 10 MG: 10 TABLET ORAL at 08:17

## 2024-03-15 RX ADMIN — ACETAMINOPHEN 650 MG: 325 TABLET ORAL at 00:47

## 2024-03-15 RX ADMIN — DESMOPRESSIN ACETATE 40 MG: 0.2 TABLET ORAL at 08:18

## 2024-03-15 RX ADMIN — CARVEDILOL 25 MG: 25 TABLET, FILM COATED ORAL at 08:17

## 2024-03-15 RX ADMIN — APIXABAN 5 MG: 5 TABLET, FILM COATED ORAL at 08:17

## 2024-03-15 RX ADMIN — HYDRALAZINE HYDROCHLORIDE 25 MG: 25 TABLET, FILM COATED ORAL at 06:05

## 2024-03-15 RX ADMIN — SERTRALINE 25 MG: 25 TABLET, FILM COATED ORAL at 08:17

## 2024-03-15 RX ADMIN — SODIUM CHLORIDE: 9 INJECTION, SOLUTION INTRAVENOUS at 00:24

## 2024-03-15 RX ADMIN — HYDRALAZINE HYDROCHLORIDE 25 MG: 25 TABLET, FILM COATED ORAL at 14:31

## 2024-03-15 RX ADMIN — ACETAMINOPHEN 650 MG: 325 TABLET ORAL at 13:34

## 2024-03-15 ASSESSMENT — PAIN SCALES - GENERAL
PAINLEVEL_OUTOF10: 3
PAINLEVEL_OUTOF10: 1
PAINLEVEL_OUTOF10: 1
PAINLEVEL_OUTOF10: 3

## 2024-03-15 ASSESSMENT — PAIN DESCRIPTION - ORIENTATION
ORIENTATION: LEFT
ORIENTATION: RIGHT;LEFT

## 2024-03-15 ASSESSMENT — ENCOUNTER SYMPTOMS
COUGH: 0
SINUS PRESSURE: 0
DIARRHEA: 0
VOMITING: 0
SINUS PAIN: 0
ABDOMINAL PAIN: 0
SHORTNESS OF BREATH: 0
NAUSEA: 0
RHINORRHEA: 0

## 2024-03-15 ASSESSMENT — PAIN DESCRIPTION - DESCRIPTORS
DESCRIPTORS: ACHING;NAGGING
DESCRIPTORS: ACHING;SORE

## 2024-03-15 ASSESSMENT — PAIN DESCRIPTION - LOCATION
LOCATION: LEG
LOCATION: LEG

## 2024-03-15 NOTE — PROGRESS NOTES
hardware related to cervicothoracic and lumbosacral spine fusions.  No other evident metallic foreign bodies in the chest, abdomen, or pelvis to preclude MRI. 2. Pulmonary vascular congestion and mild cardiomegaly. 3. Nonobstructive bowel gas pattern with a predominantly minimal stool volume. 4. Cholelithiasis.     XR ABDOMEN (KUB) (SINGLE AP VIEW)    Result Date: 3/9/2024  1. 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  No other evident metallic foreign bodies in the chest, abdomen, or pelvis to preclude MRI. 2. Pulmonary vascular congestion and mild cardiomegaly. 3. Nonobstructive bowel gas pattern with a predominantly minimal stool volume. 4. Cholelithiasis.     CT HEAD WO CONTRAST    Addendum Date: 3/9/2024    ADDENDUM: Findings were communicated to Dr. Walters by the CORE team on 3/9/2024 at 8:49 am.     Result Date: 3/9/2024  No convincing acute intracranial abnormality. These results were sent to the CORE Team on 3/9/2024 at 8:47 am to be communicated to the referring/covering health care provider/office.       ASSESSMENT & PLAN     ASSESSMENT / PLAN:     IMPRESSION  This is a 74 y.o. female who presented with the chief concern for stroke and found to have BIN and residual weakness from prior CVA. Patient admitted to inpatient status for further evaluation and management.     Principal Problem:    BIN (acute kidney injury) (HCC)  Active Problems:    Left-sided weakness    Hyperkalemia    History of stroke    Acute encephalopathy  Resolved Problems:    * No resolved hospital problems. *        # BIN -improving  -Reached out to nephrology, patient is okay to DC from nephrology standpoint  -Creatinine 3.4 at presentation (baseline appears to be 0.8-1)  -Strict I's and O's  -Monitor daily urine output  -IV fluid discontinued  -Urine studies concerning for pre-renal BIN secondary to poor oral intake  -USG kidney was unremarkable.  -Holding lisinopril and Lasix for now with the plan for  resumption after follow-up with PCP and Nephrologist     # Hyperkalemia - Resolved  -K6.5 at the time of the presentation with no prior history of CKD or ESRD. Patient was on potassium supplements at home.   -Received 2 doses of regular insulin and dextrose while inpatient  -Monitor electrolytes daily with CMP        # Aphasia and left-sided weakness-improved  # Hx of prior CVA with residual left-sided weakness  -Neurology consulted from the ED and recommends MRI and MRI of the brain  -As per neurology, she is not a candidate for TNK and recommends to continue Eliquis  -Neurology signed off, neurological deficit improved, outpatient follow-up     #Hyperchloremic metabolic acidosis- Resolved     # HFpEF with a last known EF of 55%  -2D echo done on 4/11/2023 shows EF to be around 55% with normal wall thickness no wall motion abnormality.  There was some right atrial dilatation.  -Takes Coreg 25 mg twice daily Lasix 40 mg twice daily at home  -Resume home medications but hold Lasix as per Nephrology.        # Hx of chronic DVT  -Takes Eliquis 5 mg twice daily at home   -Resume home dose of Eliquis     # Essential hypertension  -Takes hydralazine 50 mg 3 times daily, lisinopril 10 mg OD, amlodipine 5 mg OD, Coreg 25 mg twice daily  -Resume home medication as tolerated by the patient     # Mood disorder  -Takes Zoloft 25 mg twice daily and BuSpar 5 mg 3 times daily  -Resume home medications           DVT ppx: Eliquis 5 mg twice daily  GI ppx: Not indicated  Diet: ADULT DIET; Regular; Low Potassium (Less than 3000 mg/day)             PT/OT/SW: PT OT on board  Discharge Planning: Discharge pending authorization approval    Maria G Cameron MD  Internal Medicine Resident, PGY-1  [place], [place].  3/15/2024, 7:45 AM

## 2024-03-15 NOTE — PROGRESS NOTES
Comprehensive Nutrition Assessment    Type and Reason for Visit:  Initial, RD Nutrition Re-Screen/LOS    Nutrition Recommendations/Plan:   Change diet to regular (d/c low K restriction).  Monitor and follow.      Malnutrition Assessment:  Malnutrition Status:  At risk for malnutrition (Comment) (based on presence of chronic disease with decreased intakes and obesity.) (03/15/24 1311)    Context:  Acute Illness     Findings of the 6 clinical characteristics of malnutrition:  Energy Intake:  75% or less of estimated energy requirements for 7 or more days  Weight Loss:  No significant weight loss     Body Fat Loss:  No significant body fat loss     Muscle Mass Loss:  No significant muscle mass loss    Fluid Accumulation:  No significant fluid accumulation     Strength:  Not Performed    Nutrition Assessment:    Pt assessed d/t LOS. Tolerating regular/low K diet. Intakes good. Improved BIN. Tarik in Cr yesterday but decreased today. Questionable spike in weight today; increase of 14# noted as compared to past 4 days. No edema noted. No nephrology note yet today. Pt states that her appetite and intakes are improved but not great. She reports eating ~50% meals currently. She does not want ONS.    Nutrition Related Findings:    Meds/labs reviewed. Cr 1.2. K 4. LBM 3/14. Wound Type: None       Current Nutrition Intake & Therapies:    Average Meal Intake: 26-50%, 51-75%  Average Supplements Intake: None Ordered  ADULT DIET; Regular; Low Potassium (Less than 3000 mg/day)    Anthropometric Measures:  Height: 167.6 cm (5' 6\")  Ideal Body Weight (IBW): 130 lbs (59 kg)    Admission Body Weight: 83.8 kg (184 lb 12.8 oz)  Current Body Weight: 89.4 kg (197 lb 1.5 oz), 151.6 % IBW. Weight Source: Bed Scale  Current BMI (kg/m2): 31.8  Usual Body Weight: 88.5 kg (195 lb)  % Weight Change (Calculated): 1.1  Weight Adjustment For: No Adjustment                 BMI Categories: Obese Class 1 (BMI 30.0-34.9)    Estimated Daily Nutrient

## 2024-03-15 NOTE — PROGRESS NOTES
Pt discharged to [facility] with [facility] and all belongings. Report called to [place] with all questions answered at this time.

## 2024-03-15 NOTE — PROGRESS NOTES
Physical Therapy  Facility/Department: [address] ORTHO/MED SURG  Physical Therapy Treatment Note    Name: Criselda Tinoco  : 1950  MRN: [id number]  Date of Service: 3/15/2024    Discharge Recommendations:  Patient would benefit from continued therapy after discharge   PT Equipment Recommendations  Equipment Needed: Yes  Mobility Devices: Walker  Walker: Rolling  Other: Pt requires RW to safetly attempt ambulation with skilled assistance. Pt unsafe to attempt any ambulation without skilled assistance      Patient Diagnosis(es): The primary encounter diagnosis was BIN (acute kidney injury) ([facility]). Diagnoses of Stroke-like symptom, Aphasia, Acute encephalopathy, and Acute renal failure with tubular necrosis ([facility]) were also pertinent to this visit.  Past Medical History:  has a past medical history of Allergic rhinitis, cause unspecified, Back pain, Bowel obstruction ([facility]), C. difficile diarrhea, CAD (coronary artery disease), Cardiac murmur, Cellulitis, Cerebral artery occlusion with cerebral infarction ([facility]), COVID-19, Diverticulosis of colon (without mention of hemorrhage), GERD (gastroesophageal reflux disease), History of blood transfusion, History of CHF (congestive heart failure), History of MI (myocardial infarction), History of ovarian cyst, History of peritonitis, HTN (hypertension), Hx of blood clots, Hyperlipidemia, Intestinal or peritoneal adhesions with obstruction (postoperative) (postinfection) ([facility]), Kidney infection, Lateral epicondylitis  of elbow, MDRO (multiple drug resistant organisms) resistance, Muscle strain, Other abnormal glucose, PONV (postoperative nausea and vomiting), Pre-diabetes, Restless legs syndrome (RLS), Snores, 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  Past Surgical History:  has a past surgical  Individual Concurrent Group Co-treatment   Time In 1325         Time Out 1348         Minutes 23                 Riaz Lane, PT

## 2024-03-15 NOTE — PROGRESS NOTES
Renal Progress Note    Patient :  Criselda Tinoco; 74 y.o. MRN# [id number]  Location:  0247/0247-01  Attending:  Loren Diallo MD  Admit Date:  3/9/2024   Hospital Day: 6      Subjective:     Urine output good.  Feels well.  Creatinine down to 1.2.  No shortness of breath orthopnea.  Blood pressure is well-controlled.  No dizziness or lightheadedness.  Labs today show sodium 144 potassium 4 chloride 114 bicarb 19 creatinine 1.2, glucose 106, calcium 8.4, hemoglobin 9.9      History reviewed  Known history of hypertension, previous history of right CVA with left weakness, resides in an ECF facility.  Also has known history of nonobstructive coronary artery disease.  Presented to the hospital with worsening left-sided weakness aphasia and facial droop.  She was hypotensive on admission admission.  Was found to have a creatinine of 3.4.  Nephrology was consulted.  Workup unremarkable.  CT head and MRI was unremarkable for any acute CVAs.  With hydration renal function improving.  ACE inhibitors and diuretics on hold  We were reconsulted 3/14/2024, after signing off, for bump in creatinine.  Has been having some difficulty with urination.  Creatinine up to 1.6.  Patient also had fluctuations of blood pressure.  After Lucero was placed and hemodynamics were stabilized creatinine has come down to 1.2    Outpatient Medications:     Medications Prior to Admission: atorvastatin (LIPITOR) 40 MG tablet, Take 1 tablet by mouth daily  lidocaine (LMX) 4 % cream, Apply topically as needed for Pain Apply topically as needed.  magnesium hydroxide (MILK OF MAGNESIA) 400 MG/5ML suspension, Take by mouth daily as needed for Constipation  sertraline (ZOLOFT) 25 MG tablet, Take 1 tablet by mouth in the morning, at noon, and at bedtime  baclofen (LIORESAL) 10 MG tablet, Take 1 tablet by mouth 2 times daily  [DISCONTINUED] hydrALAZINE (APRESOLINE) 50 MG tablet, Take 1 tablet by mouth 3 times daily  [DISCONTINUED] lisinopril (PRINIVIL;ZESTRIL)

## 2024-03-18 ENCOUNTER — HOSPITAL ENCOUNTER (EMERGENCY)
Age: 74
Discharge: HOME | End: 2024-03-18
Attending: EMERGENCY MEDICINE
Payer: MEDICARE

## 2024-03-18 VITALS
HEART RATE: 58 BPM | HEIGHT: 66 IN | WEIGHT: 197 LBS | OXYGEN SATURATION: 96 % | SYSTOLIC BLOOD PRESSURE: 122 MMHG | DIASTOLIC BLOOD PRESSURE: 55 MMHG | RESPIRATION RATE: 13 BRPM | TEMPERATURE: 98 F | BODY MASS INDEX: 31.66 KG/M2

## 2024-03-18 DIAGNOSIS — R53.83 OTHER FATIGUE: Primary | ICD-10-CM

## 2024-03-18 LAB
ALBUMIN SERPL-MCNC: 3.9 G/DL (ref 3.5–5.2)
ALP SERPL-CCNC: 45 U/L (ref 35–104)
ALT SERPL-CCNC: 15 U/L (ref 5–33)
ANION GAP SERPL CALCULATED.3IONS-SCNC: 11 MMOL/L (ref 9–17)
AST SERPL-CCNC: 17 U/L
BACTERIA URNS QL MICRO: ABNORMAL
BASOPHILS # BLD: 0.1 K/UL (ref 0–0.2)
BASOPHILS NFR BLD: 1 % (ref 0–2)
BILIRUB SERPL-MCNC: 0.2 MG/DL (ref 0.3–1.2)
BILIRUB UR QL STRIP: NEGATIVE
BUN SERPL-MCNC: 35 MG/DL (ref 8–23)
CALCIUM SERPL-MCNC: 8.8 MG/DL (ref 8.6–10.4)
CASTS #/AREA URNS LPF: ABNORMAL /LPF
CHLORIDE SERPL-SCNC: 108 MMOL/L (ref 98–107)
CLARITY UR: CLEAR
CO2 SERPL-SCNC: 23 MMOL/L (ref 20–31)
COLOR UR: YELLOW
CREAT SERPL-MCNC: 1 MG/DL (ref 0.5–0.9)
EOSINOPHIL # BLD: 0.5 K/UL (ref 0–0.4)
EOSINOPHILS RELATIVE PERCENT: 7 % (ref 0–4)
EPI CELLS #/AREA URNS HPF: ABNORMAL /HPF
ERYTHROCYTE [DISTWIDTH] IN BLOOD BY AUTOMATED COUNT: 13.4 % (ref 11.5–14.9)
GFR SERPL CREATININE-BSD FRML MDRD: 59 ML/MIN/1.73M2
GLUCOSE SERPL-MCNC: 110 MG/DL (ref 70–99)
GLUCOSE UR STRIP-MCNC: NEGATIVE MG/DL
HCT VFR BLD AUTO: 32.1 % (ref 36–46)
HGB BLD-MCNC: 10.5 G/DL (ref 12–16)
HGB UR QL STRIP.AUTO: NEGATIVE
KETONES UR STRIP-MCNC: NEGATIVE MG/DL
LEUKOCYTE ESTERASE UR QL STRIP: NEGATIVE
LYMPHOCYTES NFR BLD: 2.1 K/UL (ref 1–4.8)
LYMPHOCYTES RELATIVE PERCENT: 29 % (ref 24–44)
MCH RBC QN AUTO: 31.6 PG (ref 26–34)
MCHC RBC AUTO-ENTMCNC: 32.8 G/DL (ref 31–37)
MCV RBC AUTO: 96.3 FL (ref 80–100)
MONOCYTES NFR BLD: 0.6 K/UL (ref 0.1–1.3)
MONOCYTES NFR BLD: 8 % (ref 1–7)
NEUTROPHILS NFR BLD: 55 % (ref 36–66)
NEUTS SEG NFR BLD: 4 K/UL (ref 1.3–9.1)
NITRITE UR QL STRIP: NEGATIVE
PH UR STRIP: 5 [PH] (ref 5–8)
PLATELET # BLD AUTO: 309 K/UL (ref 150–450)
PMV BLD AUTO: 7.7 FL (ref 6–12)
POTASSIUM SERPL-SCNC: 3.9 MMOL/L (ref 3.7–5.3)
PROT SERPL-MCNC: 6.7 G/DL (ref 6.4–8.3)
PROT UR STRIP-MCNC: NEGATIVE MG/DL
RBC # BLD AUTO: 3.34 M/UL (ref 4–5.2)
RBC #/AREA URNS HPF: ABNORMAL /HPF
SODIUM SERPL-SCNC: 142 MMOL/L (ref 135–144)
SP GR UR STRIP: 1.01 (ref 1–1.03)
TROPONIN I SERPL HS-MCNC: 21 NG/L (ref 0–14)
TROPONIN I SERPL HS-MCNC: 21 NG/L (ref 0–14)
UROBILINOGEN UR STRIP-ACNC: NORMAL EU/DL (ref 0–1)
WBC #/AREA URNS HPF: ABNORMAL /HPF
WBC OTHER # BLD: 7.3 K/UL (ref 3.5–11)

## 2024-03-18 PROCEDURE — 36415 COLL VENOUS BLD VENIPUNCTURE: CPT

## 2024-03-18 PROCEDURE — 81001 URINALYSIS AUTO W/SCOPE: CPT

## 2024-03-18 PROCEDURE — 85025 COMPLETE CBC W/AUTO DIFF WBC: CPT

## 2024-03-18 PROCEDURE — 84484 ASSAY OF TROPONIN QUANT: CPT

## 2024-03-18 PROCEDURE — 93005 ELECTROCARDIOGRAM TRACING: CPT | Performed by: EMERGENCY MEDICINE

## 2024-03-18 PROCEDURE — 80053 COMPREHEN METABOLIC PANEL: CPT

## 2024-03-18 PROCEDURE — 99284 EMERGENCY DEPT VISIT MOD MDM: CPT

## 2024-03-18 ASSESSMENT — ENCOUNTER SYMPTOMS
COUGH: 0
ABDOMINAL PAIN: 0

## 2024-03-18 ASSESSMENT — PAIN SCALES - GENERAL
PAINLEVEL_OUTOF10: 0
PAINLEVEL_OUTOF10: 0

## 2024-03-18 ASSESSMENT — PAIN - FUNCTIONAL ASSESSMENT
PAIN_FUNCTIONAL_ASSESSMENT: NONE - DENIES PAIN
PAIN_FUNCTIONAL_ASSESSMENT: 0-10

## 2024-03-18 NOTE — ED TRIAGE NOTES
Mode of arrival (squad #, walk in, police, etc) : EMS        Chief complaint(s): Fatigue         Arrival Note (brief scenario, treatment PTA, etc).: Pt arrived via [facility]. Pt lives at [facility] and had an episode of \"not responding\" for about 10 seconds, only responsive to painful stimuli per EMS. EMS stated that upon their arrival pt appears to be sleeping in bed for about 10sec. And responded to painful stimuli. Pt arrives to ED AO x4, able to follows all commands, verbalized c/o feeling fatigue.         C= \"Have you ever felt that you should Cut down on your drinking?\"  No  A= \"Have people Annoyed you by criticizing your drinking?\"  No  G= \"Have you ever felt bad or Guilty about your drinking?\"  No  E= \"Have you ever had a drink as an Eye-opener first thing in the morning to steady your nerves or to help a hangover?\"  No      Deferred []      Reason for deferring: N/A    *If yes to two or more: probable alcohol abuse.*   Yes

## 2024-03-18 NOTE — ED NOTES
Report given to MEENU Espinoza from ED.   Report method in person   The following was reviewed with receiving RN:   Current vital signs:  BP (!) 157/66   Pulse 69   Temp 98 °F (36.7 °C) (Oral)   Resp 14   Ht 1.676 m (5' 6\")   Wt 89.4 kg (197 lb)   SpO2 96%   BMI 31.80 kg/m²                      Any medication or safety alerts were reviewed. Any pending diagnostics and notifications were also reviewed, as well as any safety concerns or issues, abnormal labs, abnormal imaging, and abnormal assessment findings. Questions were answered.

## 2024-03-18 NOTE — ED PROVIDER NOTES
[facility] ED  EMERGENCY DEPARTMENT ENCOUNTER      Pt Name: Criselda Tinoco  MRN: [id number]  Birthdate 1950  Date of evaluation: 3/18/24      CHIEF COMPLAINT       Chief Complaint   Patient presents with    Fatigue         HISTORY OF PRESENT ILLNESS   HPI 74 y.o. female presents with c/o drowsiness / fatigue.  Pt reports that she has been very drowsy and fatigued for the last few days.  She states that she is sleeping well at night.  She reports that she dozed off through [employer] yesterday.  She reports that she was having a hard time staying awake this afternoon even though she slept fine last night.   Recently admitted to hospital, had a stroke evaluation.  No cp, no abdominal pain.  No burning with urination.  TSH checked on 3/'10/24 and was 3.47.  Medications include buspirone 5mg tid, coreg 26mg qd, combivent, colace,gabapentin 200mg bid, hydrazlaine 26mg tid, melatonin 3mg at bedtime, zofran 4mg tid, sertraline 25mg daily, bicarb 650mg, apixaban 5mg bid, atorvastatin 40mg qd, baclofen 10mg twice a day. Amlodipine 10mg qd, tylenol 650mg qid prn    REVIEW OF SYSTEMS       Review of Systems   Constitutional:  Positive for activity change, appetite change and fatigue.   HENT:  Negative for congestion.    Respiratory:  Negative for cough.    Cardiovascular:  Negative for chest pain.   Gastrointestinal:  Negative for abdominal pain.   Skin:  Negative for rash.   Neurological:  Negative for seizures and headaches.       PAST MEDICAL HISTORY     Past Medical History:   Diagnosis Date    Allergic rhinitis, cause unspecified     Back pain     lumbar    Bowel obstruction (HCC)     history of due to scar tissue, resolved non-surgically    C. difficile diarrhea     CAD (coronary artery disease)     no stent needed per pt. Dr. Solorzano did cath at [facility] 2005    Cardiac murmur     Cellulitis 2017 August    leg left leg/bug bite    Cerebral artery occlusion with cerebral infarction (HCC)     TIA 2014    COVID-19     ONE YR

## 2024-03-18 NOTE — DISCHARGE INSTRUCTIONS
Received refill request from pharmacy. Chart reviewed. Medication filled per standing order.   Recommend evaluation by primary care for reduction of sedating medications.   Encourage hydration, nutrition, physical activity, and good sleep hygiene.   Return if any new concerning symptoms develop.

## 2024-03-19 LAB
EKG ATRIAL RATE: 63 BPM
EKG P AXIS: 42 DEGREES
EKG P-R INTERVAL: 160 MS
EKG Q-T INTERVAL: 424 MS
EKG QRS DURATION: 76 MS
EKG QTC CALCULATION (BAZETT): 433 MS
EKG R AXIS: 14 DEGREES
EKG T AXIS: 27 DEGREES
EKG VENTRICULAR RATE: 63 BPM

## 2024-03-19 PROCEDURE — 93010 ELECTROCARDIOGRAM REPORT: CPT | Performed by: INTERNAL MEDICINE

## 2024-04-08 NOTE — PROGRESS NOTES
Nutrition Assessment (Low Risk)    Type and Reason for Visit: Positive Nutrition Screen(Unplanned weight loss, decreased appetite. Dietitian consult needed: poor intake)    Nutrition Recommendations: Continue Renal diet. Consider starting Carbohydrate Control restriction and discontinuing Renal diet as acute kidney injury improves. Nutrition Assessment:  Patient assessed for nutritional risk. Deemed to be at low risk at this time. Will continue to monitor for changes in status. Patient apperas to be well nourished on admission but is not at risk for compromise due to altered GI function related to GI bleed, nausea and history of diarrhea (2-3 day history). Patient states she has intentionally lost about 25 pounds over the past few months for better glucose control. Nausea and dry heaves have greately improved and patient ate well at lunch today. Continue Renal diet and monitor EGD results. Consider adding Carbohydrate control diet and discontinuing Renal diet as acute kidney injury improves. Malnutrition Assessment:  · Malnutrition Status: At risk for malnutrition    Nutrition Risk Level   Risk Level: Low    Nutrition Diagnosis:   · Problem: Altered GI function  · Etiology:  Alteration in GI function    Signs and symptoms: Nausea, GI abnormality(dark stools)    Nutrition Intervention:  Food and/or Delivery: Continue current diet  Nutrition Education/Counseling/Coordination of Care:  Continued Inpatient Monitoring      MayitoUtica Psychiatric CenterMATT, RIA,  Clinical Dietitian  Cell # [phone]  Office # [phone] numerical 0-10

## 2024-05-09 ENCOUNTER — HOSPITAL ENCOUNTER (OUTPATIENT)
Age: 74
Setting detail: SPECIMEN
Discharge: HOME | End: 2024-05-09
Payer: MEDICARE

## 2024-05-09 LAB
ALBUMIN SERPL-MCNC: 4.1 G/DL (ref 3.5–5.2)
ALBUMIN/GLOB SERPL: 2 {RATIO} (ref 1–2.5)
ALP SERPL-CCNC: 54 U/L (ref 35–104)
ALT SERPL-CCNC: 15 U/L (ref 10–35)
ANION GAP SERPL CALCULATED.3IONS-SCNC: 12 MMOL/L (ref 9–16)
AST SERPL-CCNC: 20 U/L (ref 10–35)
BILIRUB SERPL-MCNC: 0.4 MG/DL (ref 0–1.2)
BUN SERPL-MCNC: 18 MG/DL (ref 8–23)
CALCIUM SERPL-MCNC: 8.8 MG/DL (ref 8.6–10.4)
CHLORIDE SERPL-SCNC: 107 MMOL/L (ref 98–107)
CO2 SERPL-SCNC: 26 MMOL/L (ref 20–31)
CREAT SERPL-MCNC: 0.9 MG/DL (ref 0.5–0.9)
ERYTHROCYTE [DISTWIDTH] IN BLOOD BY AUTOMATED COUNT: 14.6 % (ref 11.8–14.4)
GFR, ESTIMATED: 71 ML/MIN/1.73M2
GLUCOSE SERPL-MCNC: 100 MG/DL (ref 74–99)
HCT VFR BLD AUTO: 33.1 % (ref 36.3–47.1)
HGB BLD-MCNC: 10.5 G/DL (ref 11.9–15.1)
MCH RBC QN AUTO: 31.5 PG (ref 25.2–33.5)
MCHC RBC AUTO-ENTMCNC: 31.7 G/DL (ref 28.4–34.8)
MCV RBC AUTO: 99.4 FL (ref 82.6–102.9)
NRBC BLD-RTO: 0 PER 100 WBC
PLATELET # BLD AUTO: 228 K/UL (ref 138–453)
PMV BLD AUTO: 9.8 FL (ref 8.1–13.5)
POTASSIUM SERPL-SCNC: 3.8 MMOL/L (ref 3.7–5.3)
PROT SERPL-MCNC: 6.4 G/DL (ref 6.6–8.7)
RBC # BLD AUTO: 3.33 M/UL (ref 3.95–5.11)
SODIUM SERPL-SCNC: 145 MMOL/L (ref 136–145)
WBC OTHER # BLD: 7.4 K/UL (ref 3.5–11.3)

## 2024-05-09 PROCEDURE — P9603 ONE-WAY ALLOW PRORATED MILES: HCPCS

## 2024-05-09 PROCEDURE — 36415 COLL VENOUS BLD VENIPUNCTURE: CPT

## 2024-05-09 PROCEDURE — 85027 COMPLETE CBC AUTOMATED: CPT

## 2024-05-09 PROCEDURE — 80053 COMPREHEN METABOLIC PANEL: CPT

## 2024-05-28 ENCOUNTER — TELEPHONE (OUTPATIENT)
Dept: INTERNAL MEDICINE CLINIC | Age: 74
End: 2024-05-28

## 2024-05-29 ENCOUNTER — HOSPITAL ENCOUNTER (OUTPATIENT)
Age: 74
Setting detail: SPECIMEN
Discharge: HOME | End: 2024-05-29
Payer: OTHER GOVERNMENT

## 2024-05-29 LAB
ANION GAP SERPL CALCULATED.3IONS-SCNC: 10 MMOL/L (ref 9–17)
BASOPHILS # BLD: 0.06 K/UL (ref 0–0.2)
BASOPHILS NFR BLD: 1 % (ref 0–2)
BNP SERPL-MCNC: 428 PG/ML
BUN SERPL-MCNC: 22 MG/DL (ref 8–23)
BUN/CREAT SERPL: 24 (ref 9–20)
CALCIUM SERPL-MCNC: 8.3 MG/DL (ref 8.6–10.4)
CHLORIDE SERPL-SCNC: 107 MMOL/L (ref 98–107)
CO2 SERPL-SCNC: 29 MMOL/L (ref 20–31)
CREAT SERPL-MCNC: 0.9 MG/DL (ref 0.5–0.9)
EOSINOPHIL # BLD: 0.49 K/UL (ref 0–0.44)
EOSINOPHILS RELATIVE PERCENT: 6 % (ref 1–4)
ERYTHROCYTE [DISTWIDTH] IN BLOOD BY AUTOMATED COUNT: 12.9 % (ref 11.8–14.4)
GFR, ESTIMATED: 68 ML/MIN/1.73M2
GLUCOSE SERPL-MCNC: 139 MG/DL (ref 70–99)
HCT VFR BLD AUTO: 31.6 % (ref 36.3–47.1)
HGB BLD-MCNC: 9.9 G/DL (ref 11.9–15.1)
IMM GRANULOCYTES # BLD AUTO: 0.02 K/UL (ref 0–0.3)
IMM GRANULOCYTES NFR BLD: 0 %
LYMPHOCYTES NFR BLD: 1.99 K/UL (ref 1.1–3.7)
LYMPHOCYTES RELATIVE PERCENT: 24 % (ref 24–43)
MCH RBC QN AUTO: 31.4 PG (ref 25.2–33.5)
MCHC RBC AUTO-ENTMCNC: 31.3 G/DL (ref 28.4–34.8)
MCV RBC AUTO: 100.3 FL (ref 82.6–102.9)
MONOCYTES NFR BLD: 0.84 K/UL (ref 0.1–1.2)
MONOCYTES NFR BLD: 10 % (ref 3–12)
NEUTROPHILS NFR BLD: 59 % (ref 36–65)
NEUTS SEG NFR BLD: 5.05 K/UL (ref 1.5–8.1)
NRBC BLD-RTO: 0 PER 100 WBC
PLATELET # BLD AUTO: 217 K/UL (ref 138–453)
PMV BLD AUTO: 10.1 FL (ref 8.1–13.5)
POTASSIUM SERPL-SCNC: 3.2 MMOL/L (ref 3.7–5.3)
RBC # BLD AUTO: 3.15 M/UL (ref 3.95–5.11)
SODIUM SERPL-SCNC: 146 MMOL/L (ref 135–144)
WBC OTHER # BLD: 8.5 K/UL (ref 3.5–11.3)

## 2024-05-29 PROCEDURE — 80048 BASIC METABOLIC PNL TOTAL CA: CPT

## 2024-05-29 PROCEDURE — P9603 ONE-WAY ALLOW PRORATED MILES: HCPCS

## 2024-05-29 PROCEDURE — 36415 COLL VENOUS BLD VENIPUNCTURE: CPT

## 2024-05-29 PROCEDURE — 85025 COMPLETE CBC W/AUTO DIFF WBC: CPT

## 2024-05-29 PROCEDURE — 83880 ASSAY OF NATRIURETIC PEPTIDE: CPT

## 2024-05-31 ENCOUNTER — APPOINTMENT (OUTPATIENT)
Dept: NUCLEAR MEDICINE | Age: 74
End: 2024-05-31
Payer: MEDICARE

## 2024-05-31 ENCOUNTER — APPOINTMENT (OUTPATIENT)
Dept: GENERAL RADIOLOGY | Age: 74
End: 2024-05-31
Payer: MEDICARE

## 2024-05-31 ENCOUNTER — HOSPITAL ENCOUNTER (OUTPATIENT)
Age: 74
Setting detail: OBSERVATION
Discharge: HOME HEALTH | End: 2024-06-05
Attending: STUDENT IN AN ORGANIZED HEALTH CARE EDUCATION/TRAINING PROGRAM | Admitting: INTERNAL MEDICINE
Payer: MEDICARE

## 2024-05-31 ENCOUNTER — APPOINTMENT (OUTPATIENT)
Age: 74
End: 2024-05-31
Payer: MEDICARE

## 2024-05-31 DIAGNOSIS — M25.512 CHRONIC LEFT SHOULDER PAIN: ICD-10-CM

## 2024-05-31 DIAGNOSIS — I20.9 ANGINA PECTORIS (HCC): ICD-10-CM

## 2024-05-31 DIAGNOSIS — R07.9 CHEST PAIN, UNSPECIFIED TYPE: Primary | ICD-10-CM

## 2024-05-31 DIAGNOSIS — G89.29 CHRONIC LEFT SHOULDER PAIN: ICD-10-CM

## 2024-05-31 LAB
ANION GAP SERPL CALCULATED.3IONS-SCNC: 10 MMOL/L (ref 9–17)
BASOPHILS # BLD: 0.1 K/UL (ref 0–0.2)
BASOPHILS NFR BLD: 1 % (ref 0–2)
BUN SERPL-MCNC: 17 MG/DL (ref 8–23)
CALCIUM SERPL-MCNC: 8.9 MG/DL (ref 8.6–10.4)
CHLORIDE SERPL-SCNC: 104 MMOL/L (ref 98–107)
CO2 SERPL-SCNC: 26 MMOL/L (ref 20–31)
CREAT SERPL-MCNC: 0.7 MG/DL (ref 0.5–0.9)
EKG ATRIAL RATE: 71 BPM
EKG P AXIS: 58 DEGREES
EKG P-R INTERVAL: 168 MS
EKG Q-T INTERVAL: 414 MS
EKG QRS DURATION: 78 MS
EKG QTC CALCULATION (BAZETT): 449 MS
EKG R AXIS: 44 DEGREES
EKG T AXIS: 54 DEGREES
EKG VENTRICULAR RATE: 71 BPM
EOSINOPHIL # BLD: 0.5 K/UL (ref 0–0.4)
EOSINOPHILS RELATIVE PERCENT: 5 % (ref 0–4)
ERYTHROCYTE [DISTWIDTH] IN BLOOD BY AUTOMATED COUNT: 13.3 % (ref 11.5–14.9)
GFR, ESTIMATED: >90 ML/MIN/1.73M2
GLUCOSE SERPL-MCNC: 131 MG/DL (ref 70–99)
HCT VFR BLD AUTO: 34.9 % (ref 36–46)
HGB BLD-MCNC: 11.4 G/DL (ref 12–16)
LYMPHOCYTES NFR BLD: 1.8 K/UL (ref 1–4.8)
LYMPHOCYTES RELATIVE PERCENT: 19 % (ref 24–44)
MAGNESIUM SERPL-MCNC: 1.7 MG/DL (ref 1.6–2.6)
MCH RBC QN AUTO: 31.3 PG (ref 26–34)
MCHC RBC AUTO-ENTMCNC: 32.7 G/DL (ref 31–37)
MCV RBC AUTO: 95.7 FL (ref 80–100)
MONOCYTES NFR BLD: 0.8 K/UL (ref 0.1–1.3)
MONOCYTES NFR BLD: 9 % (ref 1–7)
NEUTROPHILS NFR BLD: 66 % (ref 36–66)
NEUTS SEG NFR BLD: 6.5 K/UL (ref 1.3–9.1)
PLATELET # BLD AUTO: 255 K/UL (ref 150–450)
PMV BLD AUTO: 7.5 FL (ref 6–12)
POTASSIUM SERPL-SCNC: 3.3 MMOL/L (ref 3.7–5.3)
RBC # BLD AUTO: 3.64 M/UL (ref 4–5.2)
SODIUM SERPL-SCNC: 140 MMOL/L (ref 135–144)
TROPONIN I SERPL HS-MCNC: 16 NG/L (ref 0–14)
TROPONIN I SERPL HS-MCNC: 17 NG/L (ref 0–14)
WBC OTHER # BLD: 9.8 K/UL (ref 3.5–11)

## 2024-05-31 PROCEDURE — 96365 THER/PROPH/DIAG IV INF INIT: CPT

## 2024-05-31 PROCEDURE — 85025 COMPLETE CBC W/AUTO DIFF WBC: CPT

## 2024-05-31 PROCEDURE — 36415 COLL VENOUS BLD VENIPUNCTURE: CPT

## 2024-05-31 PROCEDURE — 6370000000 HC RX 637 (ALT 250 FOR IP): Performed by: NURSE PRACTITIONER

## 2024-05-31 PROCEDURE — 99285 EMERGENCY DEPT VISIT HI MDM: CPT

## 2024-05-31 PROCEDURE — 2580000003 HC RX 258: Performed by: NURSE PRACTITIONER

## 2024-05-31 PROCEDURE — 99223 1ST HOSP IP/OBS HIGH 75: CPT | Performed by: INTERNAL MEDICINE

## 2024-05-31 PROCEDURE — 97162 PT EVAL MOD COMPLEX 30 MIN: CPT

## 2024-05-31 PROCEDURE — 2580000003 HC RX 258: Performed by: STUDENT IN AN ORGANIZED HEALTH CARE EDUCATION/TRAINING PROGRAM

## 2024-05-31 PROCEDURE — 83735 ASSAY OF MAGNESIUM: CPT

## 2024-05-31 PROCEDURE — 97166 OT EVAL MOD COMPLEX 45 MIN: CPT

## 2024-05-31 PROCEDURE — 2500000003 HC RX 250 WO HCPCS: Performed by: STUDENT IN AN ORGANIZED HEALTH CARE EDUCATION/TRAINING PROGRAM

## 2024-05-31 PROCEDURE — G0378 HOSPITAL OBSERVATION PER HR: HCPCS

## 2024-05-31 PROCEDURE — 6370000000 HC RX 637 (ALT 250 FOR IP): Performed by: INTERNAL MEDICINE

## 2024-05-31 PROCEDURE — 84484 ASSAY OF TROPONIN QUANT: CPT

## 2024-05-31 PROCEDURE — 71045 X-RAY EXAM CHEST 1 VIEW: CPT

## 2024-05-31 PROCEDURE — 97530 THERAPEUTIC ACTIVITIES: CPT

## 2024-05-31 PROCEDURE — 93005 ELECTROCARDIOGRAM TRACING: CPT | Performed by: STUDENT IN AN ORGANIZED HEALTH CARE EDUCATION/TRAINING PROGRAM

## 2024-05-31 PROCEDURE — 80048 BASIC METABOLIC PNL TOTAL CA: CPT

## 2024-05-31 PROCEDURE — 6370000000 HC RX 637 (ALT 250 FOR IP): Performed by: STUDENT IN AN ORGANIZED HEALTH CARE EDUCATION/TRAINING PROGRAM

## 2024-05-31 RX ORDER — BUSPIRONE HYDROCHLORIDE 5 MG/1
5 TABLET ORAL 3 TIMES DAILY
Status: DISCONTINUED | OUTPATIENT
Start: 2024-05-31 | End: 2024-06-05 | Stop reason: HOSPADM

## 2024-05-31 RX ORDER — POTASSIUM CHLORIDE 20 MEQ/1
20 TABLET, EXTENDED RELEASE ORAL DAILY
Status: DISCONTINUED | OUTPATIENT
Start: 2024-05-31 | End: 2024-06-05 | Stop reason: HOSPADM

## 2024-05-31 RX ORDER — SODIUM BICARBONATE 650 MG/1
650 TABLET ORAL 2 TIMES DAILY
Status: DISCONTINUED | OUTPATIENT
Start: 2024-05-31 | End: 2024-06-05 | Stop reason: HOSPADM

## 2024-05-31 RX ORDER — AMLODIPINE BESYLATE 10 MG/1
10 TABLET ORAL DAILY
Status: DISCONTINUED | OUTPATIENT
Start: 2024-05-31 | End: 2024-06-05 | Stop reason: HOSPADM

## 2024-05-31 RX ORDER — GABAPENTIN 100 MG/1
200 CAPSULE ORAL 2 TIMES DAILY
Status: DISCONTINUED | OUTPATIENT
Start: 2024-05-31 | End: 2024-06-05 | Stop reason: HOSPADM

## 2024-05-31 RX ORDER — ACETAMINOPHEN 325 MG/1
650 TABLET ORAL EVERY 6 HOURS PRN
Status: DISCONTINUED | OUTPATIENT
Start: 2024-05-31 | End: 2024-06-05 | Stop reason: HOSPADM

## 2024-05-31 RX ORDER — MAGNESIUM SULFATE HEPTAHYDRATE 40 MG/ML
2000 INJECTION, SOLUTION INTRAVENOUS ONCE
Status: COMPLETED | OUTPATIENT
Start: 2024-05-31 | End: 2024-05-31

## 2024-05-31 RX ORDER — ACETAMINOPHEN 650 MG/1
650 SUPPOSITORY RECTAL EVERY 6 HOURS PRN
Status: DISCONTINUED | OUTPATIENT
Start: 2024-05-31 | End: 2024-05-31

## 2024-05-31 RX ORDER — LANOLIN ALCOHOL/MO/W.PET/CERES
6 CREAM (GRAM) TOPICAL NIGHTLY PRN
Status: DISCONTINUED | OUTPATIENT
Start: 2024-05-31 | End: 2024-06-05 | Stop reason: HOSPADM

## 2024-05-31 RX ORDER — ALBUTEROL SULFATE 90 UG/1
2 AEROSOL, METERED RESPIRATORY (INHALATION) PRN
Status: ACTIVE | OUTPATIENT
Start: 2024-05-31 | End: 2024-05-31

## 2024-05-31 RX ORDER — ACETAMINOPHEN 325 MG/1
650 TABLET ORAL EVERY 6 HOURS PRN
Status: DISCONTINUED | OUTPATIENT
Start: 2024-05-31 | End: 2024-05-31

## 2024-05-31 RX ORDER — POTASSIUM CHLORIDE 750 MG/1
20 TABLET, FILM COATED, EXTENDED RELEASE ORAL DAILY
COMMUNITY
Start: 2024-05-30

## 2024-05-31 RX ORDER — 0.9 % SODIUM CHLORIDE 0.9 %
500 INTRAVENOUS SOLUTION INTRAVENOUS ONCE
Status: COMPLETED | OUTPATIENT
Start: 2024-05-31 | End: 2024-05-31

## 2024-05-31 RX ORDER — ATROPINE SULFATE 0.1 MG/ML
0.5 INJECTION INTRAVENOUS EVERY 5 MIN PRN
Status: ACTIVE | OUTPATIENT
Start: 2024-05-31 | End: 2024-05-31

## 2024-05-31 RX ORDER — SODIUM CHLORIDE 9 MG/ML
500 INJECTION, SOLUTION INTRAVENOUS CONTINUOUS PRN
Status: ACTIVE | OUTPATIENT
Start: 2024-05-31 | End: 2024-05-31

## 2024-05-31 RX ORDER — ACETAMINOPHEN 650 MG/1
650 SUPPOSITORY RECTAL EVERY 6 HOURS PRN
Status: DISCONTINUED | OUTPATIENT
Start: 2024-05-31 | End: 2024-06-05 | Stop reason: HOSPADM

## 2024-05-31 RX ORDER — SODIUM CHLORIDE 0.9 % (FLUSH) 0.9 %
5-40 SYRINGE (ML) INJECTION EVERY 12 HOURS SCHEDULED
Status: DISCONTINUED | OUTPATIENT
Start: 2024-05-31 | End: 2024-06-05 | Stop reason: HOSPADM

## 2024-05-31 RX ORDER — POTASSIUM CHLORIDE 20 MEQ/1
40 TABLET, EXTENDED RELEASE ORAL ONCE
Status: COMPLETED | OUTPATIENT
Start: 2024-05-31 | End: 2024-05-31

## 2024-05-31 RX ORDER — SODIUM CHLORIDE 9 MG/ML
INJECTION, SOLUTION INTRAVENOUS PRN
Status: DISCONTINUED | OUTPATIENT
Start: 2024-05-31 | End: 2024-06-05 | Stop reason: HOSPADM

## 2024-05-31 RX ORDER — AMINOPHYLLINE 25 MG/ML
50 INJECTION, SOLUTION INTRAVENOUS PRN
Status: ACTIVE | OUTPATIENT
Start: 2024-05-31 | End: 2024-05-31

## 2024-05-31 RX ORDER — PANTOPRAZOLE SODIUM 40 MG/1
40 TABLET, DELAYED RELEASE ORAL
Status: DISCONTINUED | OUTPATIENT
Start: 2024-05-31 | End: 2024-06-05 | Stop reason: HOSPADM

## 2024-05-31 RX ORDER — MAGNESIUM SULFATE HEPTAHYDRATE 40 MG/ML
2000 INJECTION, SOLUTION INTRAVENOUS PRN
Status: DISCONTINUED | OUTPATIENT
Start: 2024-05-31 | End: 2024-06-05 | Stop reason: HOSPADM

## 2024-05-31 RX ORDER — METOPROLOL TARTRATE 1 MG/ML
5 INJECTION, SOLUTION INTRAVENOUS EVERY 5 MIN PRN
Status: ACTIVE | OUTPATIENT
Start: 2024-05-31 | End: 2024-05-31

## 2024-05-31 RX ORDER — CARVEDILOL 25 MG/1
25 TABLET ORAL 2 TIMES DAILY WITH MEALS
Status: DISCONTINUED | OUTPATIENT
Start: 2024-05-31 | End: 2024-06-05 | Stop reason: HOSPADM

## 2024-05-31 RX ORDER — SODIUM CHLORIDE 0.9 % (FLUSH) 0.9 %
5-40 SYRINGE (ML) INJECTION PRN
Status: DISCONTINUED | OUTPATIENT
Start: 2024-05-31 | End: 2024-06-05 | Stop reason: HOSPADM

## 2024-05-31 RX ORDER — IPRATROPIUM BROMIDE AND ALBUTEROL SULFATE 2.5; .5 MG/3ML; MG/3ML
1 SOLUTION RESPIRATORY (INHALATION) EVERY 6 HOURS PRN
Status: DISCONTINUED | OUTPATIENT
Start: 2024-05-31 | End: 2024-06-05 | Stop reason: HOSPADM

## 2024-05-31 RX ORDER — POLYETHYLENE GLYCOL 3350 17 G/17G
17 POWDER, FOR SOLUTION ORAL DAILY PRN
Status: DISCONTINUED | OUTPATIENT
Start: 2024-05-31 | End: 2024-06-05 | Stop reason: HOSPADM

## 2024-05-31 RX ORDER — REGADENOSON 0.08 MG/ML
0.4 INJECTION, SOLUTION INTRAVENOUS
Status: DISCONTINUED | OUTPATIENT
Start: 2024-05-31 | End: 2024-06-05 | Stop reason: HOSPADM

## 2024-05-31 RX ORDER — BACLOFEN 10 MG/1
10 TABLET ORAL 2 TIMES DAILY
Status: DISCONTINUED | OUTPATIENT
Start: 2024-05-31 | End: 2024-06-05 | Stop reason: HOSPADM

## 2024-05-31 RX ORDER — FUROSEMIDE 20 MG/1
20 TABLET ORAL DAILY
COMMUNITY
Start: 2024-05-07

## 2024-05-31 RX ORDER — ATORVASTATIN CALCIUM 40 MG/1
40 TABLET, FILM COATED ORAL DAILY
Status: DISCONTINUED | OUTPATIENT
Start: 2024-05-31 | End: 2024-06-05 | Stop reason: HOSPADM

## 2024-05-31 RX ORDER — NITROGLYCERIN 0.4 MG/1
0.4 TABLET SUBLINGUAL EVERY 5 MIN PRN
Status: DISPENSED | OUTPATIENT
Start: 2024-05-31 | End: 2024-05-31

## 2024-05-31 RX ORDER — ONDANSETRON 4 MG/1
4 TABLET, ORALLY DISINTEGRATING ORAL EVERY 8 HOURS PRN
Status: DISCONTINUED | OUTPATIENT
Start: 2024-05-31 | End: 2024-06-05 | Stop reason: HOSPADM

## 2024-05-31 RX ORDER — ONDANSETRON 2 MG/ML
4 INJECTION INTRAMUSCULAR; INTRAVENOUS EVERY 6 HOURS PRN
Status: DISCONTINUED | OUTPATIENT
Start: 2024-05-31 | End: 2024-06-05 | Stop reason: HOSPADM

## 2024-05-31 RX ORDER — POTASSIUM CHLORIDE 7.45 MG/ML
10 INJECTION INTRAVENOUS PRN
Status: DISCONTINUED | OUTPATIENT
Start: 2024-05-31 | End: 2024-06-05 | Stop reason: HOSPADM

## 2024-05-31 RX ORDER — NITROGLYCERIN 0.4 MG/1
0.4 TABLET SUBLINGUAL EVERY 5 MIN PRN
Status: ACTIVE | OUTPATIENT
Start: 2024-05-31 | End: 2024-05-31

## 2024-05-31 RX ORDER — POTASSIUM CHLORIDE 20 MEQ/1
40 TABLET, EXTENDED RELEASE ORAL PRN
Status: DISCONTINUED | OUTPATIENT
Start: 2024-05-31 | End: 2024-06-05 | Stop reason: HOSPADM

## 2024-05-31 RX ORDER — HYDRALAZINE HYDROCHLORIDE 25 MG/1
25 TABLET, FILM COATED ORAL 3 TIMES DAILY
Status: DISCONTINUED | OUTPATIENT
Start: 2024-05-31 | End: 2024-06-05 | Stop reason: HOSPADM

## 2024-05-31 RX ORDER — FUROSEMIDE 20 MG/1
20 TABLET ORAL DAILY
Status: DISCONTINUED | OUTPATIENT
Start: 2024-05-31 | End: 2024-06-05 | Stop reason: HOSPADM

## 2024-05-31 RX ORDER — SODIUM CHLORIDE 0.9 % (FLUSH) 0.9 %
5-40 SYRINGE (ML) INJECTION PRN
Status: ACTIVE | OUTPATIENT
Start: 2024-05-31 | End: 2024-05-31

## 2024-05-31 RX ORDER — LIDOCAINE 4 G/G
1 PATCH TOPICAL NIGHTLY
Status: DISCONTINUED | OUTPATIENT
Start: 2024-05-31 | End: 2024-06-05 | Stop reason: HOSPADM

## 2024-05-31 RX ORDER — ASPIRIN 81 MG/1
81 TABLET, CHEWABLE ORAL DAILY
Status: DISCONTINUED | OUTPATIENT
Start: 2024-06-01 | End: 2024-06-05 | Stop reason: HOSPADM

## 2024-05-31 RX ADMIN — BACLOFEN 10 MG: 10 TABLET ORAL at 20:44

## 2024-05-31 RX ADMIN — MAGNESIUM SULFATE HEPTAHYDRATE 2000 MG: 40 INJECTION, SOLUTION INTRAVENOUS at 04:00

## 2024-05-31 RX ADMIN — HYDRALAZINE HYDROCHLORIDE 25 MG: 25 TABLET ORAL at 14:26

## 2024-05-31 RX ADMIN — SERTRALINE 75 MG: 50 TABLET, FILM COATED ORAL at 07:53

## 2024-05-31 RX ADMIN — SODIUM BICARBONATE 650 MG: 650 TABLET ORAL at 07:52

## 2024-05-31 RX ADMIN — POTASSIUM CHLORIDE 40 MEQ: 1500 TABLET, EXTENDED RELEASE ORAL at 04:01

## 2024-05-31 RX ADMIN — APIXABAN 5 MG: 5 TABLET, FILM COATED ORAL at 07:55

## 2024-05-31 RX ADMIN — POTASSIUM CHLORIDE 20 MEQ: 1500 TABLET, EXTENDED RELEASE ORAL at 07:52

## 2024-05-31 RX ADMIN — CARVEDILOL 25 MG: 25 TABLET, FILM COATED ORAL at 07:53

## 2024-05-31 RX ADMIN — ACETAMINOPHEN 650 MG: 325 TABLET ORAL at 14:26

## 2024-05-31 RX ADMIN — SODIUM CHLORIDE, PRESERVATIVE FREE 10 ML: 5 INJECTION INTRAVENOUS at 08:03

## 2024-05-31 RX ADMIN — HYDRALAZINE HYDROCHLORIDE 25 MG: 25 TABLET ORAL at 20:45

## 2024-05-31 RX ADMIN — BUSPIRONE HYDROCHLORIDE 5 MG: 5 TABLET ORAL at 07:52

## 2024-05-31 RX ADMIN — AMLODIPINE BESYLATE 10 MG: 10 TABLET ORAL at 07:55

## 2024-05-31 RX ADMIN — SODIUM CHLORIDE 500 ML: 9 INJECTION, SOLUTION INTRAVENOUS at 04:19

## 2024-05-31 RX ADMIN — HYDRALAZINE HYDROCHLORIDE 25 MG: 25 TABLET ORAL at 07:55

## 2024-05-31 RX ADMIN — ACETAMINOPHEN 650 MG: 325 TABLET ORAL at 20:45

## 2024-05-31 RX ADMIN — BUSPIRONE HYDROCHLORIDE 5 MG: 5 TABLET ORAL at 20:45

## 2024-05-31 RX ADMIN — SODIUM CHLORIDE, PRESERVATIVE FREE 10 ML: 5 INJECTION INTRAVENOUS at 20:45

## 2024-05-31 RX ADMIN — GABAPENTIN 200 MG: 100 CAPSULE ORAL at 20:45

## 2024-05-31 RX ADMIN — NITROGLYCERIN 0.4 MG: 0.4 TABLET, ORALLY DISINTEGRATING SUBLINGUAL at 01:58

## 2024-05-31 RX ADMIN — BUSPIRONE HYDROCHLORIDE 5 MG: 5 TABLET ORAL at 14:26

## 2024-05-31 RX ADMIN — FUROSEMIDE 20 MG: 20 TABLET ORAL at 07:55

## 2024-05-31 RX ADMIN — CARVEDILOL 25 MG: 25 TABLET, FILM COATED ORAL at 16:48

## 2024-05-31 RX ADMIN — PANTOPRAZOLE SODIUM 40 MG: 40 TABLET, DELAYED RELEASE ORAL at 07:56

## 2024-05-31 RX ADMIN — SODIUM BICARBONATE 650 MG: 650 TABLET ORAL at 20:45

## 2024-05-31 RX ADMIN — GABAPENTIN 200 MG: 100 CAPSULE ORAL at 07:52

## 2024-05-31 RX ADMIN — BACLOFEN 10 MG: 10 TABLET ORAL at 07:52

## 2024-05-31 RX ADMIN — APIXABAN 5 MG: 5 TABLET, FILM COATED ORAL at 20:44

## 2024-05-31 RX ADMIN — ATORVASTATIN CALCIUM 40 MG: 40 TABLET, FILM COATED ORAL at 07:52

## 2024-05-31 ASSESSMENT — PAIN DESCRIPTION - FREQUENCY
FREQUENCY: INTERMITTENT
FREQUENCY: INTERMITTENT

## 2024-05-31 ASSESSMENT — HEART SCORE: ECG: NORMAL

## 2024-05-31 ASSESSMENT — PAIN SCALES - GENERAL
PAINLEVEL_OUTOF10: 3
PAINLEVEL_OUTOF10: 4
PAINLEVEL_OUTOF10: 7
PAINLEVEL_OUTOF10: 5
PAINLEVEL_OUTOF10: 4
PAINLEVEL_OUTOF10: 2
PAINLEVEL_OUTOF10: 4
PAINLEVEL_OUTOF10: 7

## 2024-05-31 ASSESSMENT — PAIN DESCRIPTION - DESCRIPTORS
DESCRIPTORS: ACHING
DESCRIPTORS: HEAVINESS
DESCRIPTORS: ACHING
DESCRIPTORS: ACHING

## 2024-05-31 ASSESSMENT — LIFESTYLE VARIABLES
HOW OFTEN DO YOU HAVE A DRINK CONTAINING ALCOHOL: NEVER
HOW MANY STANDARD DRINKS CONTAINING ALCOHOL DO YOU HAVE ON A TYPICAL DAY: PATIENT DOES NOT DRINK

## 2024-05-31 ASSESSMENT — PAIN DESCRIPTION - ORIENTATION
ORIENTATION: MID
ORIENTATION: MID;POSTERIOR

## 2024-05-31 ASSESSMENT — PAIN DESCRIPTION - LOCATION
LOCATION: NECK
LOCATION: CHEST
LOCATION: NECK
LOCATION: NECK;HEAD

## 2024-05-31 ASSESSMENT — PAIN - FUNCTIONAL ASSESSMENT
PAIN_FUNCTIONAL_ASSESSMENT: 0-10
PAIN_FUNCTIONAL_ASSESSMENT: PREVENTS OR INTERFERES SOME ACTIVE ACTIVITIES AND ADLS

## 2024-05-31 ASSESSMENT — PAIN DESCRIPTION - PAIN TYPE
TYPE: CHRONIC PAIN
TYPE: ACUTE PAIN

## 2024-05-31 NOTE — ED NOTES
TRANSFER - OUT REPORT:    Verbal report given to Conchita CORRIGAN on Criselda Tinoco  being transferred to 2082 for routine progression of patient care       Report consisted of patient's Situation, Background, Assessment and   Recommendations(SBAR).     Information from the following report(s) ED Encounter Summary was reviewed with the receiving nurse.    Renu Fall Assessment:    Presents to emergency department  because of falls (Syncope, seizure, or loss of consciousness): No  Age > 70: Yes  Altered Mental Status, Intoxication with alcohol or substance confusion (Disorientation, impaired judgment, poor safety awaremess, or inability to follow instructions): No  Impaired Mobility: Ambulates or transfers with assistive devices or assistance; Unable to ambulate or transer.: Yes  Nursing Judgement: Yes          Lines:   Peripheral IV 05/31/24 Left;Dorsal Forearm (Active)   Site Assessment Clean, dry & intact 05/31/24 0244   Line Status Blood return noted 05/31/24 0244   Phlebitis Assessment No symptoms 05/31/24 0244   Infiltration Assessment 0 05/31/24 0244   Dressing Status Clean, dry & intact 05/31/24 0244   Dressing Type Transparent 05/31/24 0244   Dressing Intervention New 05/31/24 0244        Opportunity for questions and clarification was provided.      Patient transported with:  Tech

## 2024-05-31 NOTE — ACP (ADVANCE CARE PLANNING)
Advance Care Planning     Advance Care Planning Activator (Inpatient)  Conversation Note      Date of ACP Conversation: 5/31/2024     Conversation Conducted with: Patient with Decision Making Capacity    ACP Activator: Lesly Polanco RN    Health Care Decision Maker:     Current Designated Health Care Decision Maker:     Primary Decision Maker: True Tinoco - Spouse - [phone]    Secondary Decision Maker: Lesly Clark - Child - [phone]  Click here to complete Healthcare Decision Makers including section of the Healthcare Decision Maker Relationship (ie \"Primary\")  Today we documented Decision Maker(s) consistent with Legal Next of Kin hierarchy.    Care Preferences    Ventilation:  \"If you were in your present state of health and suddenly became very ill and were unable to breathe on your own, what would your preference be about the use of a ventilator (breathing machine) if it were available to you?\"      Would the patient desire the use of ventilator (breathing machine)?: yes    \"If your health worsens and it becomes clear that your chance of recovery is unlikely, what would your preference be about the use of a ventilator (breathing machine) if it were available to you?\"     Would the patient desire the use of ventilator (breathing machine)?: No      Resuscitation  \"CPR works best to restart the heart when there is a sudden event, like a heart attack, in someone who is otherwise healthy. Unfortunately, CPR does not typically restart the heart for people who have serious health conditions or who are very sick.\"    \"In the event your heart stopped as a result of an underlying serious health condition, would you want attempts to be made to restart your heart (answer \"yes\" for attempt to resuscitate) or would you prefer a natural death (answer \"no\" for do not attempt to resuscitate)?\" yes       [] Yes   [] No   Educated Patient / Decision Maker regarding differences between Advance Directives and

## 2024-05-31 NOTE — DISCHARGE INSTR - COC
Net -400 ml     I/O last 3 completed shifts:  In: -   Out: 400 [Urine:400]    Safety Concerns:     At Risk for Falls    Impairments/Disabilities:      Vision and Hearing    Nutrition Therapy:  Current Nutrition Therapy:   - Oral Diet:  General    Routes of Feeding: None  Liquids: No Restrictions  Daily Fluid Restriction: no  Last Modified Barium Swallow with Video (Video Swallowing Test): not done    Treatments at the Time of Hospital Discharge:   Respiratory Treatments: duineb PRN Q6H  Oxygen Therapy:  is not on home oxygen therapy.  Ventilator:    - No ventilator support    Rehab Therapies: Physical Therapy and Occupational Therapy  Weight Bearing Status/Restrictions: No weight bearing restrictions  Other Medical Equipment (for information only, NOT a DME order):  n/a  Other Treatments: Skilled Nursing assessment and monitoring. Medication education and monitoring per protocol.     Social service consult - Assess for any medical equipment needed for the home    Patient's personal belongings (please select all that are sent with patient):  Oj    RN SIGNATURE:  Electronically signed by Samaria Mcgrath RN on 6/5/24 at 5:19 PM EDT    CASE MANAGEMENT/SOCIAL WORK SECTION    Inpatient Status Date:     Readmission Risk Assessment Score:  Readmission Risk              Risk of Unplanned Readmission:  0           Discharging to Facility/ Agency   [facility]  [address]  [place] [address]  Phone [phone]  Fax  [phone]       Dialysis Facility (if applicable)   Name:  Address:  Dialysis Schedule:  Phone:  Fax:    / signature: Electronically signed by Lesly Polanco RN on 5/31/24 at 9:43 AM EDT    PHYSICIAN SECTION    Prognosis: Fair    Condition at Discharge: Stable    Rehab Potential (if transferring to Rehab): Fair    Recommended Labs or Other Treatments After Discharge: see above    Physician Certification: I certify the above information and transfer of

## 2024-05-31 NOTE — ED PROVIDER NOTES
[facility]  Emergency Department Encounter  Emergency Medicine Physician     Pt Name: Criselda Tinoco  MRN: [id number]  Birthdate 1950  Date of evaluation: 5/31/24  PCP:  [address] MD    CHIEF COMPLAINT       Chief Complaint   Patient presents with    Chest Pain     Radiating to the left arm/ sudden onset       HISTORY OF PRESENT ILLNESS  (Location/Symptom, Timing/Onset, Context/Setting, Quality, Duration, Modifying Factors, Severity.)    Criselda Tinoco is a 74 y.o. female who presents with chest pain.  Patient resides [facility].  She was complaining of some nausea earlier in the night for which she received Zofran.  She then began planing of some left-sided chest pain that radiated to the left arm and [facility] called 911.  Prior to their arrival they gave her 1 sublingual nitroglycerin which she states did help.  EMS gave her 324 mg of aspirin.  They obtained EKGs and brought patient to the ER.  Patient states that she still having some left-sided chest pain with left arm pain but it is better than previously.  States that she has chronic neck pain.  Denies any abdominal pain.  Denies any nausea.  States this feels similar to when she had a heart attack back in 2005.        PAST MEDICAL / SURGICAL / SOCIAL / FAMILY HISTORY    has a past medical history of Allergic rhinitis, cause unspecified, Back pain, Bowel obstruction (HCC), C. difficile diarrhea, CAD (coronary artery disease), Cardiac murmur, Cellulitis, Cerebral artery occlusion with cerebral infarction (HCC), COVID-19, Diverticulosis of colon (without mention of hemorrhage), GERD (gastroesophageal reflux disease), History of blood transfusion, History of CHF (congestive heart failure), History of MI (myocardial infarction), History of ovarian cyst, History of peritonitis, HTN (hypertension), Hx of blood clots, Hyperlipidemia, Intestinal or peritoneal adhesions with obstruction (postoperative) (postinfection)

## 2024-05-31 NOTE — H&P
[facility]   IN-PATIENT SERVICE   [facility]    HISTORY AND PHYSICAL EXAMINATION            Date:   5/31/2024  Patient name:  Criselda Tinoco  Date of admission:  5/31/2024  1:47 AM  MRN:   [id number]  Account:  [id number]  YOB: 1950  PCP:    [address] MD  Room:   20812081[facility]  Code Status:    Full Code    Chief Complaint:     Chief Complaint   Patient presents with    Chest Pain     Radiating to the left arm/ sudden onset       History Obtained From:     patient    History of Present Illness:     The patient is a 74 y.o.  Non- / non  female who presents with Chest Pain (Radiating to the left arm/ sudden onset)   and she is admitted to the hospital for the management of      Criselda Tinoco is a 74 y.o. Non- / non  female who presents with Chest Pain (Radiating to the left arm/ sudden onset) and is admitted to the hospital for the management of Angina pectoris (HCC).  Medical history significant for HTN, HLD, nonobstructive CAD, atrial fibrillation and chronic DVT on Eliquis, history of CVA with residual left-sided weakness.  Cervical kyphosis with cervical spine fusion, chronic neck pain.  Patient has been residing in long-term nursing facility since August 2023 after cervical fusion. She was sent to ED by nursing facility for chest pain/pressure radiating to left arm.  Describes the pain as intermittent. Relieved with nitroglycerin and 324 mg aspirin administered by EMS.  Pain had resolved prior to arrival in ED but reoccurred requiring additional nitroglycerin sublingual tab while awaiting results of testing.  Endorses nausea without vomiting.  Denies shortness of breath. EKG NSR, no ST changes. Troponin 16-17, . Last echo April 2023 EF> 55%. Last stress test was December 2023 at [facility] which revealed Left ventricular ejection fraction 75%. Normal stress rest myocardial SPECT perfusion scan of the heart. Denies fever, chills,

## 2024-06-01 ENCOUNTER — APPOINTMENT (OUTPATIENT)
Dept: GENERAL RADIOLOGY | Age: 74
End: 2024-06-01
Payer: MEDICARE

## 2024-06-01 LAB
ANION GAP SERPL CALCULATED.3IONS-SCNC: 12 MMOL/L (ref 9–17)
BUN SERPL-MCNC: 23 MG/DL (ref 8–23)
CALCIUM SERPL-MCNC: 8.7 MG/DL (ref 8.6–10.4)
CHLORIDE SERPL-SCNC: 106 MMOL/L (ref 98–107)
CHOLEST SERPL-MCNC: 111 MG/DL
CHOLESTEROL/HDL RATIO: 2.2
CO2 SERPL-SCNC: 25 MMOL/L (ref 20–31)
CREAT SERPL-MCNC: 0.7 MG/DL (ref 0.5–0.9)
ERYTHROCYTE [DISTWIDTH] IN BLOOD BY AUTOMATED COUNT: 13.5 % (ref 11.5–14.9)
EST. AVERAGE GLUCOSE BLD GHB EST-MCNC: 103 MG/DL
GFR, ESTIMATED: >90 ML/MIN/1.73M2
GLUCOSE SERPL-MCNC: 116 MG/DL (ref 70–99)
HBA1C MFR BLD: 5.2 % (ref 4–6)
HCT VFR BLD AUTO: 34.2 % (ref 36–46)
HDLC SERPL-MCNC: 50 MG/DL
HGB BLD-MCNC: 11.2 G/DL (ref 12–16)
LDLC SERPL CALC-MCNC: 47 MG/DL (ref 0–130)
MAGNESIUM SERPL-MCNC: 2.2 MG/DL (ref 1.6–2.6)
MCH RBC QN AUTO: 31.2 PG (ref 26–34)
MCHC RBC AUTO-ENTMCNC: 32.7 G/DL (ref 31–37)
MCV RBC AUTO: 95.3 FL (ref 80–100)
PLATELET # BLD AUTO: 214 K/UL (ref 150–450)
PMV BLD AUTO: 7.7 FL (ref 6–12)
POTASSIUM SERPL-SCNC: 4.2 MMOL/L (ref 3.7–5.3)
RBC # BLD AUTO: 3.59 M/UL (ref 4–5.2)
SODIUM SERPL-SCNC: 143 MMOL/L (ref 135–144)
TRIGL SERPL-MCNC: 70 MG/DL
TSH SERPL DL<=0.05 MIU/L-ACNC: 1.36 UIU/ML (ref 0.3–5)
WBC OTHER # BLD: 7.2 K/UL (ref 3.5–11)

## 2024-06-01 PROCEDURE — 6370000000 HC RX 637 (ALT 250 FOR IP): Performed by: INTERNAL MEDICINE

## 2024-06-01 PROCEDURE — 2580000003 HC RX 258: Performed by: NURSE PRACTITIONER

## 2024-06-01 PROCEDURE — 85027 COMPLETE CBC AUTOMATED: CPT

## 2024-06-01 PROCEDURE — 83036 HEMOGLOBIN GLYCOSYLATED A1C: CPT

## 2024-06-01 PROCEDURE — 2500000003 HC RX 250 WO HCPCS: Performed by: NURSE PRACTITIONER

## 2024-06-01 PROCEDURE — 99233 SBSQ HOSP IP/OBS HIGH 50: CPT | Performed by: INTERNAL MEDICINE

## 2024-06-01 PROCEDURE — 80061 LIPID PANEL: CPT

## 2024-06-01 PROCEDURE — 83735 ASSAY OF MAGNESIUM: CPT

## 2024-06-01 PROCEDURE — G0378 HOSPITAL OBSERVATION PER HR: HCPCS

## 2024-06-01 PROCEDURE — 36415 COLL VENOUS BLD VENIPUNCTURE: CPT

## 2024-06-01 PROCEDURE — 73030 X-RAY EXAM OF SHOULDER: CPT

## 2024-06-01 PROCEDURE — 6370000000 HC RX 637 (ALT 250 FOR IP): Performed by: NURSE PRACTITIONER

## 2024-06-01 PROCEDURE — 80048 BASIC METABOLIC PNL TOTAL CA: CPT

## 2024-06-01 PROCEDURE — 84443 ASSAY THYROID STIM HORMONE: CPT

## 2024-06-01 RX ORDER — GUAIFENESIN 200 MG/10ML
200 LIQUID ORAL EVERY 4 HOURS PRN
Status: DISCONTINUED | OUTPATIENT
Start: 2024-06-01 | End: 2024-06-05 | Stop reason: HOSPADM

## 2024-06-01 RX ADMIN — SERTRALINE 25 MG: 50 TABLET, FILM COATED ORAL at 20:08

## 2024-06-01 RX ADMIN — HYDRALAZINE HYDROCHLORIDE 25 MG: 25 TABLET ORAL at 09:01

## 2024-06-01 RX ADMIN — POTASSIUM CHLORIDE 20 MEQ: 1500 TABLET, EXTENDED RELEASE ORAL at 09:01

## 2024-06-01 RX ADMIN — BACLOFEN 10 MG: 10 TABLET ORAL at 20:09

## 2024-06-01 RX ADMIN — HYDRALAZINE HYDROCHLORIDE 25 MG: 25 TABLET ORAL at 14:08

## 2024-06-01 RX ADMIN — AMLODIPINE BESYLATE 10 MG: 10 TABLET ORAL at 09:01

## 2024-06-01 RX ADMIN — PANTOPRAZOLE SODIUM 40 MG: 40 TABLET, DELAYED RELEASE ORAL at 07:21

## 2024-06-01 RX ADMIN — HYDRALAZINE HYDROCHLORIDE 25 MG: 25 TABLET ORAL at 20:08

## 2024-06-01 RX ADMIN — BUSPIRONE HYDROCHLORIDE 5 MG: 5 TABLET ORAL at 09:01

## 2024-06-01 RX ADMIN — CARVEDILOL 25 MG: 25 TABLET, FILM COATED ORAL at 17:16

## 2024-06-01 RX ADMIN — GUAIFENESIN 200 MG: 200 SOLUTION ORAL at 20:31

## 2024-06-01 RX ADMIN — GABAPENTIN 200 MG: 100 CAPSULE ORAL at 20:08

## 2024-06-01 RX ADMIN — SODIUM CHLORIDE, PRESERVATIVE FREE 10 ML: 5 INJECTION INTRAVENOUS at 09:02

## 2024-06-01 RX ADMIN — ASPIRIN 81 MG 81 MG: 81 TABLET ORAL at 09:01

## 2024-06-01 RX ADMIN — GABAPENTIN 200 MG: 100 CAPSULE ORAL at 09:01

## 2024-06-01 RX ADMIN — FUROSEMIDE 20 MG: 20 TABLET ORAL at 09:01

## 2024-06-01 RX ADMIN — SERTRALINE 25 MG: 50 TABLET, FILM COATED ORAL at 09:01

## 2024-06-01 RX ADMIN — SODIUM BICARBONATE 650 MG: 650 TABLET ORAL at 09:01

## 2024-06-01 RX ADMIN — SODIUM BICARBONATE 650 MG: 650 TABLET ORAL at 20:09

## 2024-06-01 RX ADMIN — BUSPIRONE HYDROCHLORIDE 5 MG: 5 TABLET ORAL at 20:08

## 2024-06-01 RX ADMIN — CARVEDILOL 25 MG: 25 TABLET, FILM COATED ORAL at 09:01

## 2024-06-01 RX ADMIN — SODIUM CHLORIDE, PRESERVATIVE FREE 10 ML: 5 INJECTION INTRAVENOUS at 20:10

## 2024-06-01 RX ADMIN — BACLOFEN 10 MG: 10 TABLET ORAL at 09:01

## 2024-06-01 RX ADMIN — GUAIFENESIN 200 MG: 200 SOLUTION ORAL at 12:37

## 2024-06-01 RX ADMIN — APIXABAN 5 MG: 5 TABLET, FILM COATED ORAL at 09:01

## 2024-06-01 RX ADMIN — SERTRALINE 25 MG: 50 TABLET, FILM COATED ORAL at 14:08

## 2024-06-01 RX ADMIN — Medication 6 MG: at 22:13

## 2024-06-01 RX ADMIN — ATORVASTATIN CALCIUM 40 MG: 40 TABLET, FILM COATED ORAL at 09:01

## 2024-06-01 RX ADMIN — BUSPIRONE HYDROCHLORIDE 5 MG: 5 TABLET ORAL at 14:08

## 2024-06-01 RX ADMIN — APIXABAN 5 MG: 5 TABLET, FILM COATED ORAL at 20:09

## 2024-06-02 LAB
ANION GAP SERPL CALCULATED.3IONS-SCNC: 11 MMOL/L (ref 9–17)
BUN SERPL-MCNC: 24 MG/DL (ref 8–23)
CALCIUM SERPL-MCNC: 9.1 MG/DL (ref 8.6–10.4)
CHLORIDE SERPL-SCNC: 105 MMOL/L (ref 98–107)
CO2 SERPL-SCNC: 27 MMOL/L (ref 20–31)
CREAT SERPL-MCNC: 0.8 MG/DL (ref 0.5–0.9)
ERYTHROCYTE [DISTWIDTH] IN BLOOD BY AUTOMATED COUNT: 13.3 % (ref 11.5–14.9)
GFR, ESTIMATED: 78 ML/MIN/1.73M2
GLUCOSE SERPL-MCNC: 114 MG/DL (ref 70–99)
HCT VFR BLD AUTO: 34.1 % (ref 36–46)
HGB BLD-MCNC: 11.5 G/DL (ref 12–16)
MCH RBC QN AUTO: 32.1 PG (ref 26–34)
MCHC RBC AUTO-ENTMCNC: 33.6 G/DL (ref 31–37)
MCV RBC AUTO: 95.5 FL (ref 80–100)
PLATELET # BLD AUTO: 257 K/UL (ref 150–450)
PMV BLD AUTO: 7.8 FL (ref 6–12)
POTASSIUM SERPL-SCNC: 3.8 MMOL/L (ref 3.7–5.3)
RBC # BLD AUTO: 3.57 M/UL (ref 4–5.2)
SODIUM SERPL-SCNC: 143 MMOL/L (ref 135–144)
WBC OTHER # BLD: 8.8 K/UL (ref 3.5–11)

## 2024-06-02 PROCEDURE — 80048 BASIC METABOLIC PNL TOTAL CA: CPT

## 2024-06-02 PROCEDURE — 2500000003 HC RX 250 WO HCPCS: Performed by: NURSE PRACTITIONER

## 2024-06-02 PROCEDURE — 6370000000 HC RX 637 (ALT 250 FOR IP): Performed by: INTERNAL MEDICINE

## 2024-06-02 PROCEDURE — 36415 COLL VENOUS BLD VENIPUNCTURE: CPT

## 2024-06-02 PROCEDURE — G0378 HOSPITAL OBSERVATION PER HR: HCPCS

## 2024-06-02 PROCEDURE — 99233 SBSQ HOSP IP/OBS HIGH 50: CPT | Performed by: INTERNAL MEDICINE

## 2024-06-02 PROCEDURE — 2580000003 HC RX 258: Performed by: NURSE PRACTITIONER

## 2024-06-02 PROCEDURE — 6370000000 HC RX 637 (ALT 250 FOR IP): Performed by: NURSE PRACTITIONER

## 2024-06-02 PROCEDURE — 85027 COMPLETE CBC AUTOMATED: CPT

## 2024-06-02 RX ADMIN — ATORVASTATIN CALCIUM 40 MG: 40 TABLET, FILM COATED ORAL at 08:02

## 2024-06-02 RX ADMIN — BACLOFEN 10 MG: 10 TABLET ORAL at 08:02

## 2024-06-02 RX ADMIN — APIXABAN 5 MG: 5 TABLET, FILM COATED ORAL at 08:01

## 2024-06-02 RX ADMIN — GABAPENTIN 200 MG: 100 CAPSULE ORAL at 20:53

## 2024-06-02 RX ADMIN — GUAIFENESIN 200 MG: 200 SOLUTION ORAL at 08:01

## 2024-06-02 RX ADMIN — SODIUM CHLORIDE, PRESERVATIVE FREE 10 ML: 5 INJECTION INTRAVENOUS at 08:03

## 2024-06-02 RX ADMIN — BUSPIRONE HYDROCHLORIDE 5 MG: 5 TABLET ORAL at 20:54

## 2024-06-02 RX ADMIN — GABAPENTIN 200 MG: 100 CAPSULE ORAL at 08:01

## 2024-06-02 RX ADMIN — APIXABAN 5 MG: 5 TABLET, FILM COATED ORAL at 20:53

## 2024-06-02 RX ADMIN — GUAIFENESIN 200 MG: 200 SOLUTION ORAL at 04:12

## 2024-06-02 RX ADMIN — CARVEDILOL 25 MG: 25 TABLET, FILM COATED ORAL at 16:52

## 2024-06-02 RX ADMIN — AMLODIPINE BESYLATE 10 MG: 10 TABLET ORAL at 08:02

## 2024-06-02 RX ADMIN — PANTOPRAZOLE SODIUM 40 MG: 40 TABLET, DELAYED RELEASE ORAL at 06:25

## 2024-06-02 RX ADMIN — SERTRALINE 25 MG: 50 TABLET, FILM COATED ORAL at 20:53

## 2024-06-02 RX ADMIN — FUROSEMIDE 20 MG: 20 TABLET ORAL at 08:02

## 2024-06-02 RX ADMIN — SODIUM BICARBONATE 650 MG: 650 TABLET ORAL at 20:53

## 2024-06-02 RX ADMIN — POTASSIUM CHLORIDE 20 MEQ: 1500 TABLET, EXTENDED RELEASE ORAL at 08:02

## 2024-06-02 RX ADMIN — BUSPIRONE HYDROCHLORIDE 5 MG: 5 TABLET ORAL at 08:02

## 2024-06-02 RX ADMIN — SODIUM BICARBONATE 650 MG: 650 TABLET ORAL at 08:01

## 2024-06-02 RX ADMIN — SODIUM CHLORIDE, PRESERVATIVE FREE 10 ML: 5 INJECTION INTRAVENOUS at 20:54

## 2024-06-02 RX ADMIN — SERTRALINE 25 MG: 50 TABLET, FILM COATED ORAL at 08:02

## 2024-06-02 RX ADMIN — ASPIRIN 81 MG 81 MG: 81 TABLET ORAL at 08:01

## 2024-06-02 RX ADMIN — CARVEDILOL 25 MG: 25 TABLET, FILM COATED ORAL at 08:02

## 2024-06-02 RX ADMIN — SERTRALINE 25 MG: 50 TABLET, FILM COATED ORAL at 15:23

## 2024-06-02 RX ADMIN — GUAIFENESIN 200 MG: 200 SOLUTION ORAL at 13:56

## 2024-06-02 RX ADMIN — ACETAMINOPHEN 650 MG: 325 TABLET ORAL at 11:09

## 2024-06-02 RX ADMIN — ACETAMINOPHEN 650 MG: 325 TABLET ORAL at 19:31

## 2024-06-02 RX ADMIN — BUSPIRONE HYDROCHLORIDE 5 MG: 5 TABLET ORAL at 13:56

## 2024-06-02 RX ADMIN — GUAIFENESIN 200 MG: 200 SOLUTION ORAL at 20:59

## 2024-06-02 RX ADMIN — HYDRALAZINE HYDROCHLORIDE 25 MG: 25 TABLET ORAL at 13:56

## 2024-06-02 RX ADMIN — BACLOFEN 10 MG: 10 TABLET ORAL at 20:53

## 2024-06-02 RX ADMIN — HYDRALAZINE HYDROCHLORIDE 25 MG: 25 TABLET ORAL at 08:02

## 2024-06-02 ASSESSMENT — PAIN - FUNCTIONAL ASSESSMENT: PAIN_FUNCTIONAL_ASSESSMENT: ACTIVITIES ARE NOT PREVENTED

## 2024-06-02 ASSESSMENT — PAIN SCALES - WONG BAKER
WONGBAKER_NUMERICALRESPONSE: HURTS A LITTLE BIT

## 2024-06-02 ASSESSMENT — PAIN SCALES - GENERAL
PAINLEVEL_OUTOF10: 1
PAINLEVEL_OUTOF10: 5
PAINLEVEL_OUTOF10: 2
PAINLEVEL_OUTOF10: 2
PAINLEVEL_OUTOF10: 3

## 2024-06-02 ASSESSMENT — PAIN DESCRIPTION - DESCRIPTORS: DESCRIPTORS: ACHING

## 2024-06-02 ASSESSMENT — PAIN DESCRIPTION - LOCATION: LOCATION: HEAD

## 2024-06-02 ASSESSMENT — PAIN DESCRIPTION - ORIENTATION: ORIENTATION: POSTERIOR

## 2024-06-03 LAB
ANION GAP SERPL CALCULATED.3IONS-SCNC: 12 MMOL/L (ref 9–17)
BUN SERPL-MCNC: 28 MG/DL (ref 8–23)
CALCIUM SERPL-MCNC: 8.8 MG/DL (ref 8.6–10.4)
CHLORIDE SERPL-SCNC: 104 MMOL/L (ref 98–107)
CO2 SERPL-SCNC: 26 MMOL/L (ref 20–31)
CREAT SERPL-MCNC: 0.9 MG/DL (ref 0.5–0.9)
ERYTHROCYTE [DISTWIDTH] IN BLOOD BY AUTOMATED COUNT: 13.2 % (ref 11.5–14.9)
GFR, ESTIMATED: 68 ML/MIN/1.73M2
GLUCOSE SERPL-MCNC: 112 MG/DL (ref 70–99)
HCT VFR BLD AUTO: 36.8 % (ref 36–46)
HGB BLD-MCNC: 12.3 G/DL (ref 12–16)
MCH RBC QN AUTO: 31.8 PG (ref 26–34)
MCHC RBC AUTO-ENTMCNC: 33.3 G/DL (ref 31–37)
MCV RBC AUTO: 95.5 FL (ref 80–100)
PLATELET # BLD AUTO: 268 K/UL (ref 150–450)
PMV BLD AUTO: 7.4 FL (ref 6–12)
POTASSIUM SERPL-SCNC: 4 MMOL/L (ref 3.7–5.3)
RBC # BLD AUTO: 3.85 M/UL (ref 4–5.2)
SODIUM SERPL-SCNC: 142 MMOL/L (ref 135–144)
WBC OTHER # BLD: 7 K/UL (ref 3.5–11)

## 2024-06-03 PROCEDURE — 36415 COLL VENOUS BLD VENIPUNCTURE: CPT

## 2024-06-03 PROCEDURE — 6370000000 HC RX 637 (ALT 250 FOR IP): Performed by: NURSE PRACTITIONER

## 2024-06-03 PROCEDURE — 80048 BASIC METABOLIC PNL TOTAL CA: CPT

## 2024-06-03 PROCEDURE — 99232 SBSQ HOSP IP/OBS MODERATE 35: CPT | Performed by: INTERNAL MEDICINE

## 2024-06-03 PROCEDURE — 85027 COMPLETE CBC AUTOMATED: CPT

## 2024-06-03 PROCEDURE — 6370000000 HC RX 637 (ALT 250 FOR IP): Performed by: INTERNAL MEDICINE

## 2024-06-03 PROCEDURE — 2500000003 HC RX 250 WO HCPCS: Performed by: NURSE PRACTITIONER

## 2024-06-03 PROCEDURE — 2580000003 HC RX 258: Performed by: NURSE PRACTITIONER

## 2024-06-03 PROCEDURE — G0378 HOSPITAL OBSERVATION PER HR: HCPCS

## 2024-06-03 RX ORDER — HYDROXYZINE HYDROCHLORIDE 10 MG/1
10 TABLET, FILM COATED ORAL 3 TIMES DAILY PRN
Status: DISCONTINUED | OUTPATIENT
Start: 2024-06-03 | End: 2024-06-05 | Stop reason: HOSPADM

## 2024-06-03 RX ADMIN — CARVEDILOL 25 MG: 25 TABLET, FILM COATED ORAL at 09:36

## 2024-06-03 RX ADMIN — SERTRALINE 25 MG: 50 TABLET, FILM COATED ORAL at 17:09

## 2024-06-03 RX ADMIN — ASPIRIN 81 MG 81 MG: 81 TABLET ORAL at 09:36

## 2024-06-03 RX ADMIN — HYDRALAZINE HYDROCHLORIDE 25 MG: 25 TABLET ORAL at 13:54

## 2024-06-03 RX ADMIN — SERTRALINE 25 MG: 50 TABLET, FILM COATED ORAL at 20:51

## 2024-06-03 RX ADMIN — BENZOCAINE 6 MG-MENTHOL 10 MG LOZENGES 1 LOZENGE: at 23:37

## 2024-06-03 RX ADMIN — GUAIFENESIN 200 MG: 200 SOLUTION ORAL at 13:54

## 2024-06-03 RX ADMIN — GUAIFENESIN 200 MG: 200 SOLUTION ORAL at 06:55

## 2024-06-03 RX ADMIN — SODIUM CHLORIDE, PRESERVATIVE FREE 10 ML: 5 INJECTION INTRAVENOUS at 09:40

## 2024-06-03 RX ADMIN — CARVEDILOL 25 MG: 25 TABLET, FILM COATED ORAL at 17:09

## 2024-06-03 RX ADMIN — GABAPENTIN 200 MG: 100 CAPSULE ORAL at 20:51

## 2024-06-03 RX ADMIN — SODIUM CHLORIDE, PRESERVATIVE FREE 10 ML: 5 INJECTION INTRAVENOUS at 20:52

## 2024-06-03 RX ADMIN — BUSPIRONE HYDROCHLORIDE 5 MG: 5 TABLET ORAL at 13:54

## 2024-06-03 RX ADMIN — FUROSEMIDE 20 MG: 20 TABLET ORAL at 09:36

## 2024-06-03 RX ADMIN — POTASSIUM CHLORIDE 20 MEQ: 1500 TABLET, EXTENDED RELEASE ORAL at 09:36

## 2024-06-03 RX ADMIN — BUSPIRONE HYDROCHLORIDE 5 MG: 5 TABLET ORAL at 20:51

## 2024-06-03 RX ADMIN — APIXABAN 5 MG: 5 TABLET, FILM COATED ORAL at 20:51

## 2024-06-03 RX ADMIN — AMLODIPINE BESYLATE 10 MG: 10 TABLET ORAL at 09:36

## 2024-06-03 RX ADMIN — SODIUM BICARBONATE 650 MG: 650 TABLET ORAL at 09:36

## 2024-06-03 RX ADMIN — PANTOPRAZOLE SODIUM 40 MG: 40 TABLET, DELAYED RELEASE ORAL at 06:40

## 2024-06-03 RX ADMIN — ATORVASTATIN CALCIUM 40 MG: 40 TABLET, FILM COATED ORAL at 09:36

## 2024-06-03 RX ADMIN — SERTRALINE 25 MG: 50 TABLET, FILM COATED ORAL at 09:36

## 2024-06-03 RX ADMIN — HYDRALAZINE HYDROCHLORIDE 25 MG: 25 TABLET ORAL at 09:36

## 2024-06-03 RX ADMIN — BACLOFEN 10 MG: 10 TABLET ORAL at 09:36

## 2024-06-03 RX ADMIN — HYDROXYZINE HYDROCHLORIDE 10 MG: 10 TABLET ORAL at 23:37

## 2024-06-03 RX ADMIN — HYDRALAZINE HYDROCHLORIDE 25 MG: 25 TABLET ORAL at 20:52

## 2024-06-03 RX ADMIN — BUSPIRONE HYDROCHLORIDE 5 MG: 5 TABLET ORAL at 09:36

## 2024-06-03 RX ADMIN — APIXABAN 5 MG: 5 TABLET, FILM COATED ORAL at 09:36

## 2024-06-03 RX ADMIN — BACLOFEN 10 MG: 10 TABLET ORAL at 20:51

## 2024-06-03 RX ADMIN — GABAPENTIN 200 MG: 100 CAPSULE ORAL at 09:36

## 2024-06-03 RX ADMIN — SODIUM BICARBONATE 650 MG: 650 TABLET ORAL at 20:51

## 2024-06-03 ASSESSMENT — PAIN SCALES - WONG BAKER
WONGBAKER_NUMERICALRESPONSE: HURTS A LITTLE BIT

## 2024-06-03 ASSESSMENT — PAIN - FUNCTIONAL ASSESSMENT
PAIN_FUNCTIONAL_ASSESSMENT: ACTIVITIES ARE NOT PREVENTED

## 2024-06-03 ASSESSMENT — PAIN SCALES - GENERAL
PAINLEVEL_OUTOF10: 7
PAINLEVEL_OUTOF10: 3
PAINLEVEL_OUTOF10: 3
PAINLEVEL_OUTOF10: 2

## 2024-06-03 ASSESSMENT — PAIN DESCRIPTION - ONSET
ONSET: ON-GOING

## 2024-06-03 ASSESSMENT — PAIN DESCRIPTION - LOCATION
LOCATION: SHOULDER
LOCATION: THROAT
LOCATION: SHOULDER
LOCATION: SHOULDER

## 2024-06-03 ASSESSMENT — PAIN DESCRIPTION - DESCRIPTORS
DESCRIPTORS: ACHING
DESCRIPTORS: DISCOMFORT

## 2024-06-03 ASSESSMENT — PAIN DESCRIPTION - ORIENTATION
ORIENTATION: LEFT

## 2024-06-03 ASSESSMENT — PAIN DESCRIPTION - PAIN TYPE
TYPE: CHRONIC PAIN

## 2024-06-03 ASSESSMENT — PAIN DESCRIPTION - FREQUENCY
FREQUENCY: CONTINUOUS

## 2024-06-04 PROBLEM — G89.29 CHRONIC LEFT SHOULDER PAIN: Status: ACTIVE | Noted: 2024-06-04

## 2024-06-04 PROBLEM — M25.512 CHRONIC LEFT SHOULDER PAIN: Status: ACTIVE | Noted: 2024-06-04

## 2024-06-04 PROCEDURE — G0378 HOSPITAL OBSERVATION PER HR: HCPCS

## 2024-06-04 PROCEDURE — 99233 SBSQ HOSP IP/OBS HIGH 50: CPT | Performed by: INTERNAL MEDICINE

## 2024-06-04 PROCEDURE — 6370000000 HC RX 637 (ALT 250 FOR IP): Performed by: NURSE PRACTITIONER

## 2024-06-04 PROCEDURE — 97110 THERAPEUTIC EXERCISES: CPT

## 2024-06-04 PROCEDURE — 97530 THERAPEUTIC ACTIVITIES: CPT

## 2024-06-04 PROCEDURE — 6360000002 HC RX W HCPCS: Performed by: NURSE PRACTITIONER

## 2024-06-04 PROCEDURE — 96375 TX/PRO/DX INJ NEW DRUG ADDON: CPT

## 2024-06-04 PROCEDURE — 2580000003 HC RX 258: Performed by: NURSE PRACTITIONER

## 2024-06-04 PROCEDURE — 99221 1ST HOSP IP/OBS SF/LOW 40: CPT | Performed by: ORTHOPAEDIC SURGERY

## 2024-06-04 PROCEDURE — 6370000000 HC RX 637 (ALT 250 FOR IP): Performed by: INTERNAL MEDICINE

## 2024-06-04 PROCEDURE — 2500000003 HC RX 250 WO HCPCS: Performed by: NURSE PRACTITIONER

## 2024-06-04 RX ORDER — LIDOCAINE 4 G/G
1 PATCH TOPICAL DAILY
Status: DISCONTINUED | OUTPATIENT
Start: 2024-06-04 | End: 2024-06-05 | Stop reason: HOSPADM

## 2024-06-04 RX ORDER — OXYCODONE HYDROCHLORIDE 5 MG/1
5 TABLET ORAL EVERY 4 HOURS PRN
Status: DISCONTINUED | OUTPATIENT
Start: 2024-06-04 | End: 2024-06-05 | Stop reason: HOSPADM

## 2024-06-04 RX ADMIN — SODIUM CHLORIDE, PRESERVATIVE FREE 10 ML: 5 INJECTION INTRAVENOUS at 09:21

## 2024-06-04 RX ADMIN — FUROSEMIDE 20 MG: 20 TABLET ORAL at 09:16

## 2024-06-04 RX ADMIN — OXYCODONE 5 MG: 5 TABLET ORAL at 11:05

## 2024-06-04 RX ADMIN — SERTRALINE 25 MG: 50 TABLET, FILM COATED ORAL at 09:17

## 2024-06-04 RX ADMIN — HYDRALAZINE HYDROCHLORIDE 25 MG: 25 TABLET ORAL at 21:12

## 2024-06-04 RX ADMIN — SODIUM BICARBONATE 650 MG: 650 TABLET ORAL at 21:11

## 2024-06-04 RX ADMIN — BUSPIRONE HYDROCHLORIDE 5 MG: 5 TABLET ORAL at 13:55

## 2024-06-04 RX ADMIN — Medication 6 MG: at 21:11

## 2024-06-04 RX ADMIN — HYDRALAZINE HYDROCHLORIDE 25 MG: 25 TABLET ORAL at 09:17

## 2024-06-04 RX ADMIN — GABAPENTIN 200 MG: 100 CAPSULE ORAL at 09:16

## 2024-06-04 RX ADMIN — OXYCODONE 5 MG: 5 TABLET ORAL at 19:52

## 2024-06-04 RX ADMIN — SERTRALINE 25 MG: 50 TABLET, FILM COATED ORAL at 21:11

## 2024-06-04 RX ADMIN — CARVEDILOL 25 MG: 25 TABLET, FILM COATED ORAL at 09:17

## 2024-06-04 RX ADMIN — BACLOFEN 10 MG: 10 TABLET ORAL at 21:11

## 2024-06-04 RX ADMIN — CARVEDILOL 25 MG: 25 TABLET, FILM COATED ORAL at 17:47

## 2024-06-04 RX ADMIN — SERTRALINE 25 MG: 50 TABLET, FILM COATED ORAL at 13:55

## 2024-06-04 RX ADMIN — SODIUM BICARBONATE 650 MG: 650 TABLET ORAL at 09:16

## 2024-06-04 RX ADMIN — POTASSIUM CHLORIDE 20 MEQ: 1500 TABLET, EXTENDED RELEASE ORAL at 09:17

## 2024-06-04 RX ADMIN — ATORVASTATIN CALCIUM 40 MG: 40 TABLET, FILM COATED ORAL at 09:17

## 2024-06-04 RX ADMIN — GUAIFENESIN 200 MG: 200 SOLUTION ORAL at 13:55

## 2024-06-04 RX ADMIN — BUSPIRONE HYDROCHLORIDE 5 MG: 5 TABLET ORAL at 21:11

## 2024-06-04 RX ADMIN — GABAPENTIN 200 MG: 100 CAPSULE ORAL at 21:11

## 2024-06-04 RX ADMIN — PANTOPRAZOLE SODIUM 40 MG: 40 TABLET, DELAYED RELEASE ORAL at 05:51

## 2024-06-04 RX ADMIN — ASPIRIN 81 MG 81 MG: 81 TABLET ORAL at 09:17

## 2024-06-04 RX ADMIN — APIXABAN 5 MG: 5 TABLET, FILM COATED ORAL at 21:12

## 2024-06-04 RX ADMIN — APIXABAN 5 MG: 5 TABLET, FILM COATED ORAL at 09:17

## 2024-06-04 RX ADMIN — BUSPIRONE HYDROCHLORIDE 5 MG: 5 TABLET ORAL at 09:16

## 2024-06-04 RX ADMIN — SODIUM CHLORIDE, PRESERVATIVE FREE 10 ML: 5 INJECTION INTRAVENOUS at 21:16

## 2024-06-04 RX ADMIN — HYDRALAZINE HYDROCHLORIDE 25 MG: 25 TABLET ORAL at 13:55

## 2024-06-04 RX ADMIN — ONDANSETRON 4 MG: 2 INJECTION INTRAMUSCULAR; INTRAVENOUS at 23:21

## 2024-06-04 RX ADMIN — AMLODIPINE BESYLATE 10 MG: 10 TABLET ORAL at 09:17

## 2024-06-04 RX ADMIN — BACLOFEN 10 MG: 10 TABLET ORAL at 09:17

## 2024-06-04 ASSESSMENT — PAIN DESCRIPTION - ORIENTATION
ORIENTATION: LEFT
ORIENTATION: LEFT
ORIENTATION: RIGHT
ORIENTATION: LEFT

## 2024-06-04 ASSESSMENT — PAIN DESCRIPTION - DESCRIPTORS
DESCRIPTORS: DISCOMFORT
DESCRIPTORS: NAGGING
DESCRIPTORS: ACHING
DESCRIPTORS: ACHING;DISCOMFORT
DESCRIPTORS: ACHING;DISCOMFORT
DESCRIPTORS: NAGGING

## 2024-06-04 ASSESSMENT — PAIN DESCRIPTION - PAIN TYPE
TYPE: CHRONIC PAIN

## 2024-06-04 ASSESSMENT — PAIN SCALES - GENERAL
PAINLEVEL_OUTOF10: 5
PAINLEVEL_OUTOF10: 4
PAINLEVEL_OUTOF10: 3
PAINLEVEL_OUTOF10: 5
PAINLEVEL_OUTOF10: 10
PAINLEVEL_OUTOF10: 4
PAINLEVEL_OUTOF10: 4
PAINLEVEL_OUTOF10: 7

## 2024-06-04 ASSESSMENT — PAIN DESCRIPTION - FREQUENCY
FREQUENCY: CONTINUOUS

## 2024-06-04 ASSESSMENT — PAIN DESCRIPTION - ONSET
ONSET: ON-GOING

## 2024-06-04 ASSESSMENT — PAIN DESCRIPTION - LOCATION
LOCATION: SHOULDER
LOCATION: SHOULDER;HEAD

## 2024-06-04 ASSESSMENT — PAIN - FUNCTIONAL ASSESSMENT
PAIN_FUNCTIONAL_ASSESSMENT: ACTIVITIES ARE NOT PREVENTED
PAIN_FUNCTIONAL_ASSESSMENT: ACTIVITIES ARE NOT PREVENTED
PAIN_FUNCTIONAL_ASSESSMENT: PREVENTS OR INTERFERES SOME ACTIVE ACTIVITIES AND ADLS
PAIN_FUNCTIONAL_ASSESSMENT: PREVENTS OR INTERFERES SOME ACTIVE ACTIVITIES AND ADLS
PAIN_FUNCTIONAL_ASSESSMENT: ACTIVITIES ARE NOT PREVENTED

## 2024-06-04 ASSESSMENT — PAIN SCALES - WONG BAKER: WONGBAKER_NUMERICALRESPONSE: NO HURT

## 2024-06-04 NOTE — CONSULTS
ORTHOPAEDIC CONSULTATION    Reason for consult  Left shoulder pain    HPI / Chief Complaint  Criselda Tinoco is a 74 y.o. old female who is admitted to the hospital for angina.  Patient has been having left shoulder pain for the past 6 months.  Patient denies any injury or inciting trauma that started the pain.  Patient denies any recent falls.  Patient states that is more painful with certain activities specially trying to lift things away from the body.  She states that the pain has been gradual but has been worsening over the last 6 months.  She denies any evaluation for the shoulder in the past and states she has not tried any medications or other modalities for treatment.  She denies any radiation of the pain outside the shoulder.  She endorses most of the pain over the superior and lateral aspects of the shoulder.    Past Medical History  Criselda  has a past medical history of Allergic rhinitis, cause unspecified, Back pain, Bowel obstruction ([facility]), C. difficile diarrhea, CAD (coronary artery disease), Cardiac murmur, Cellulitis, Cerebral artery occlusion with cerebral infarction ([facility]), COVID-19, Diverticulosis of colon (without mention of hemorrhage), GERD (gastroesophageal reflux disease), History of blood transfusion, History of CHF (congestive heart failure), History of MI (myocardial infarction), History of ovarian cyst, History of peritonitis, HTN (hypertension), Hx of blood clots, Hyperlipidemia, Intestinal or peritoneal adhesions with obstruction (postoperative) (postinfection) ([facility]), Kidney infection, Lateral epicondylitis  of elbow, MDRO (multiple drug resistant organisms) resistance, Muscle strain, Other abnormal glucose, PONV (postoperative nausea and vomiting), Pre-diabetes, Restless legs syndrome (RLS), Snores, 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[facility]), TIA (transient ischemic attack), Under care of service provider, Under care of service provider, Uses walker, Vitamin D deficiency, Wears 
DVT  Neurologic: Mental status: Alert, oriented, thought content appropriate    Assessment / Acute Cardiac Problems:   Admission 5/31/2024 chest pain like a pressure, normal ECG normal high-sensitivity troponin  No previous history of coronary intervention  Normal LV systolic function  Hypertension stable  Hyperlipidemia  Congestive heart failure  Very poor mobility since cervical spine fusion 7/26/2023, gait instability  History of DVT on Eliquis      Patient Active Problem List:     Other specified disorders of rotator cuff syndrome of shoulder and allied disorders     Diverticulosis of large intestine     Intestinal or peritoneal adhesions with obstruction (postoperative) (postinfection) ([facility])     Restless legs syndrome (RLS)     GERD (gastroesophageal reflux disease)     Hypertension     Hyperlipidemia     Other abnormal glucose     Atherosclerosis     Allergic rhinitis     Vitamin D deficiency     Major depression     Degenerative joint disease (DJD) of hip     Peripheral edema     Injury of foot, left     Facial droop     CVA (cerebral vascular accident) ([facility])     Bradycardia     Ataxia     Aphasia     TIA (transient ischemic attack)     Need for prophylactic vaccination and inoculation against cholera alone     Osteopenia     Lumbago     Meralgia paresthetica of right side     Lumbar degenerative disc disease     Spondylosis of lumbar region without myelopathy or radiculopathy     Blood poisoning     Cellulitis of left lower extremity     Encounter for medication monitoring     Deep vein thrombosis (DVT) of right lower extremity (HCC)     Chronic deep vein thrombosis (DVT) of proximal vein of both lower extremities ([facility])     Chronic diastolic heart failure ([facility])     Neurogenic claudication due to lumbar spinal stenosis     Stage 3 chronic kidney disease ([facility])     Type 2 diabetes mellitus with circulatory disorder, without long-term current use of insulin ([facility])     Chronic deep vein thrombosis (DVT) of

## 2024-06-05 VITALS
RESPIRATION RATE: 18 BRPM | WEIGHT: 187.61 LBS | BODY MASS INDEX: 33.24 KG/M2 | HEART RATE: 75 BPM | HEIGHT: 63 IN | SYSTOLIC BLOOD PRESSURE: 113 MMHG | DIASTOLIC BLOOD PRESSURE: 42 MMHG | TEMPERATURE: 98.2 F | OXYGEN SATURATION: 92 %

## 2024-06-05 PROCEDURE — 97530 THERAPEUTIC ACTIVITIES: CPT

## 2024-06-05 PROCEDURE — G0378 HOSPITAL OBSERVATION PER HR: HCPCS

## 2024-06-05 PROCEDURE — 6370000000 HC RX 637 (ALT 250 FOR IP): Performed by: NURSE PRACTITIONER

## 2024-06-05 PROCEDURE — 97535 SELF CARE MNGMENT TRAINING: CPT

## 2024-06-05 PROCEDURE — 97110 THERAPEUTIC EXERCISES: CPT

## 2024-06-05 PROCEDURE — 2580000003 HC RX 258: Performed by: NURSE PRACTITIONER

## 2024-06-05 PROCEDURE — 6370000000 HC RX 637 (ALT 250 FOR IP): Performed by: INTERNAL MEDICINE

## 2024-06-05 PROCEDURE — 99239 HOSP IP/OBS DSCHRG MGMT >30: CPT | Performed by: INTERNAL MEDICINE

## 2024-06-05 RX ORDER — HYDROXYZINE HYDROCHLORIDE 10 MG/1
10 TABLET, FILM COATED ORAL 3 TIMES DAILY PRN
Qty: 30 TABLET | Refills: 0 | Status: SHIPPED | OUTPATIENT
Start: 2024-06-05 | End: 2024-06-15

## 2024-06-05 RX ORDER — OXYCODONE HYDROCHLORIDE 5 MG/1
5 TABLET ORAL EVERY 4 HOURS PRN
Qty: 12 TABLET | Refills: 0 | Status: SHIPPED | OUTPATIENT
Start: 2024-06-05 | End: 2024-06-08

## 2024-06-05 RX ADMIN — GABAPENTIN 200 MG: 100 CAPSULE ORAL at 09:20

## 2024-06-05 RX ADMIN — BUSPIRONE HYDROCHLORIDE 5 MG: 5 TABLET ORAL at 09:20

## 2024-06-05 RX ADMIN — SODIUM CHLORIDE, PRESERVATIVE FREE 10 ML: 5 INJECTION INTRAVENOUS at 09:23

## 2024-06-05 RX ADMIN — PANTOPRAZOLE SODIUM 40 MG: 40 TABLET, DELAYED RELEASE ORAL at 05:41

## 2024-06-05 RX ADMIN — ASPIRIN 81 MG 81 MG: 81 TABLET ORAL at 09:19

## 2024-06-05 RX ADMIN — POTASSIUM CHLORIDE 20 MEQ: 1500 TABLET, EXTENDED RELEASE ORAL at 09:19

## 2024-06-05 RX ADMIN — SERTRALINE 25 MG: 50 TABLET, FILM COATED ORAL at 14:07

## 2024-06-05 RX ADMIN — SODIUM BICARBONATE 650 MG: 650 TABLET ORAL at 09:20

## 2024-06-05 RX ADMIN — SERTRALINE 25 MG: 50 TABLET, FILM COATED ORAL at 09:19

## 2024-06-05 RX ADMIN — CARVEDILOL 25 MG: 25 TABLET, FILM COATED ORAL at 09:20

## 2024-06-05 RX ADMIN — AMLODIPINE BESYLATE 10 MG: 10 TABLET ORAL at 09:18

## 2024-06-05 RX ADMIN — OXYCODONE 5 MG: 5 TABLET ORAL at 05:41

## 2024-06-05 RX ADMIN — CARVEDILOL 25 MG: 25 TABLET, FILM COATED ORAL at 17:38

## 2024-06-05 RX ADMIN — BACLOFEN 10 MG: 10 TABLET ORAL at 09:19

## 2024-06-05 RX ADMIN — FUROSEMIDE 20 MG: 20 TABLET ORAL at 09:18

## 2024-06-05 RX ADMIN — HYDRALAZINE HYDROCHLORIDE 25 MG: 25 TABLET ORAL at 09:20

## 2024-06-05 RX ADMIN — BUSPIRONE HYDROCHLORIDE 5 MG: 5 TABLET ORAL at 14:07

## 2024-06-05 RX ADMIN — ATORVASTATIN CALCIUM 40 MG: 40 TABLET, FILM COATED ORAL at 09:19

## 2024-06-05 RX ADMIN — APIXABAN 5 MG: 5 TABLET, FILM COATED ORAL at 09:20

## 2024-06-05 RX ADMIN — HYDRALAZINE HYDROCHLORIDE 25 MG: 25 TABLET ORAL at 14:07

## 2024-06-05 ASSESSMENT — PAIN DESCRIPTION - LOCATION
LOCATION: NECK;SHOULDER
LOCATION: NECK

## 2024-06-05 ASSESSMENT — PAIN DESCRIPTION - ORIENTATION
ORIENTATION: MID
ORIENTATION: LEFT

## 2024-06-05 ASSESSMENT — PAIN DESCRIPTION - DESCRIPTORS
DESCRIPTORS: ACHING
DESCRIPTORS: ACHING

## 2024-06-05 ASSESSMENT — PAIN - FUNCTIONAL ASSESSMENT: PAIN_FUNCTIONAL_ASSESSMENT: ACTIVITIES ARE NOT PREVENTED

## 2024-06-05 ASSESSMENT — PAIN SCALES - GENERAL
PAINLEVEL_OUTOF10: 8
PAINLEVEL_OUTOF10: 5
PAINLEVEL_OUTOF10: 0

## 2024-06-05 NOTE — CARE COORDINATION
Case Management Assessment  Initial Evaluation    Date/Time of Evaluation: 5/31/2024 9:44 AM  Assessment Completed by: Lesly Polanco RN    If patient is discharged prior to next notation, then this note serves as note for discharge by case management.    Patient Name: Criselda Tinoco                   YOB: 1950  Diagnosis: Angina pectoris (HCC) [I20.9]  Chest pain, unspecified type [R07.9]                   Date / Time: 5/31/2024  1:47 AM    Patient Admission Status: Observation   Readmission Risk (Low < 19, Mod (19-27), High > 27): Readmission Risk Score: 20.2    Current PCP: [facility] [address] MD  PCP verified by CM? Yes    Chart Reviewed: Yes      History Provided by: Patient  Patient Orientation: Alert and Oriented    Patient Cognition: Alert    Hospitalization in the last 30 days (Readmission):  No    If yes, Readmission Assessment in CM Navigator will be completed.    Advance Directives:      Code Status: Full Code   Patient's Primary Decision Maker is: Legal Next of Kin    Primary Decision Maker: True Tinoco - Spouse - [phone]    Secondary Decision Maker: Lesly Clark - Child - [phone]    Discharge Planning:    Patient lives with: Other (Comment) ([facility]) Type of Home: Skilled Nursing Facility  Primary Care Giver: Self  Patient Support Systems include: Spouse/Significant Other, Children, Friends/Neighbors   Current Financial resources: Medicare,  (VA)  Current community resources: None  Current services prior to admission: Skilled Nursing Facility            Current DME:              Type of Home Care services:  None    ADLS  Prior functional level: Assistance with the following:, Bathing, Dressing, Toileting, Cooking, Housework, Shopping, Mobility  Current functional level: Assistance with the following:, Bathing, Dressing, Toileting, Cooking, Housework, Shopping, Mobility    PT AM-PAC:   /24  OT AM-PAC:   /24    Family can provide assistance at DC: No  Would you 
DISCHARGE PLANNING NOTE:    Message left for Intake with [facility] to confirm patient is able to return.    Electronically signed by Lesly Polanco RN on 5/31/2024 at 8:31 AM    [facility] Intake states pt is able to return when medically ready.    Electronically signed by Lesly Polanco RN on 5/31/2024 at 9:32 AM    
DISCHARGE PLANNING NOTE:    Notified by clinical lead that the patient's spouse was unhappy with the care received from [facility] and requested another facility.    This writer contacted the patient's spouse, True, via telephone and discussed options. At this time True would like referrals to [facility], [facility] and [facility]     True also stated that he feels she might be appropriate for inpatient rehabilitation. This writer reached out to PT to inquire as to whether the patient is appropriate for an inpatient rehab setting. Awaiting response.    Will continue to follow for placement.    Electronically signed by Cecelia Patrick RN on 6/1/2024 at 8:08 AM    
DISCHARGE PLANNING NOTE:    Referrals have been faxed to [facility], [facility], as well as [facility].     Attempted to contact patient's spouse via telephone again today regarding private pay at facility since she has been at [facility] for over 9 months. Voicemail left.     Per the patient she feels [facility] is very nice, however her spouse \"didn't like that I wasn't able to get therapy each day.\" It was explained to the patient that therapy will never be given daily in a facility.     When I asked the patient if they were private pay, the patient states she believes they owe money to [facility] at this time. Attempted to contact Laura with [facility] to verify, voicemail left.     Electronically signed by Cecelia Patrick RN on 6/2/2024 at 4:25 PM        
DISCHARGE PLANNING NOTE:    Spoke with Sofía from [facility] who states they had referral patient twice, however she was never opened. Sofía states they are able to accept if pt does not have another agency currently. [facility] office is going to check on this.    Electronically signed by Lesly Polanco RN on 6/5/2024 at 2:39 PM    Spoke with Malena mcintosh [facility] who states they do not have any jason lifts in stock, but they can be ordered and will take 5-7 days to come in.    Called [facility] and  [facility] and they do not have any in stock either.    Electronically signed by Lesly Polanco RN on 6/5/2024 at 2:51 PM    [facility] has jason lift available and can be delivered in the next day.    Electronically signed by Lesly Polanco RN on 6/5/2024 at 2:56 PM    Spoke with Sofía from [facility] who states pt can be accepted and [facility] will set patient up with [facility]. Notified Sofía that pt will be discharged home today. [facility] can pull ANNALEE and d/c med list from [facility].    Electronically signed by Lesly Polanco RN on 6/5/2024 at 3:01 PM    Faxed face sheet, order for lift, and face to face notes to [facility].  Phone number for [facility] provided in AVS.    Electronically signed by Lesly Polanco RN on 6/5/2024 at 3:23 PM    Received call from [facility] stating they received order for jason lift, but need additional progress notes. Faxed H&P, attending note from today, and PT/OT notes.    Electronically signed by Lesly Polanco RN on 6/5/2024 at 3:49 PM    
DISCHARGE PLANNING NOTE:    This writer notified by Elizabeth Carter [facility] that the patient has been at [facility] for nine months and they need verification of payment (ie Medicaid vs private pay) as it appears she is long-term care. Voicemail left for the patient's spouse, awaiting return call.    Electronically signed by Cecelia Patrick RN on 6/1/2024 at 1:03 PM    
Transportation arranged for patient to go to [address] [place] [address] [address] [place] [address] at 1745 via [facility]. Bedside nurse informed.     Pt spouse informed of discharge time.  Electronically signed by HIPOLITO Hanson on 6/5/2024 at 5:13 PM      
Writer call [facility] and patient is not current with them since 2023.    Electronically signed by Yolande Lamar RN on 6/5/2024 at 2:30 PM   
Writer called Registration to see how many Medicare days patient has left.      To Date.. Patient has 60 lifetime reserve days left    20 medicare skilled days    60 Medicare acute Full days left- This is for Hospital stay coverage.     Electronically signed by Yolande Lamar RN on 6/5/2024 at 11:53 AM   
Writer is following for potential discharge placement.    Writer placed message to Rosa Elena at [facility]. Facility denies this pt.  Writer placed call to Cindy with [facility]. Facility denies this pt.    Writer placed call to [facility] admissions. Facility will place call back to writer with determination.   Electronically signed by HIPOLITO Hanson on 6/4/2024 at 11:24 AM    Writer received call back from Vesna with [facility]. Facility ran financials for an approximate cost of 310.00 a day if pt needs a long-term semi private room. Writer agreed to Vesna sending this referral to administration for further approval.    Writer met with pt to discuss, pt requests writer call spouse True regarding placement, not currently at the hospital at this time.    No answer, voicemail full. Writer sent SMS notification for return call.  Electronically signed by HIPOLITO Hanson on 6/4/2024 at 2:25 PM    
Writer is following for potential discharge placement.   Writer placed call to Vesna with [facility]. Facility denies this pt.    Writer placed call to pt spouse True for update and to discuss placement as there is no accepting facility at this time. True states he does not have the money to private pay for this pt at facility. True states he was not unhappy with the care at [facility] but just wished pt had more therapy.     Writer placed call to Nava with [facility] to verify pt return to facility. Nava paigeivis pt has a balance at facility and that  has been working with True multiple times for Medicaid application. Pt is a financial liability.    Writer attempted to place call back to True to discuss home discharge. No answer, voicemail left for return call. Writer met with pt for update. Pt states she will also attempt to call True.  Electronically signed by HIPOLITO Hanson on 6/5/2024 at 1:11 PM    Writer spoke to True regarding placement and balance owed to [facility]. True states he will have to bring pt home if there is not another option. True states he does not have any lift equipment to move the pt but has a hospital bed and a  raised toilet seat for pt at home. True states he will have to prepare room for this pt. Writer asked True if he has had [facility] in the past, True states [facility]. MEENU Grace verifying homecare and if lift equipment can be ordered for this pt.  Electronically signed by HIPOLITO Hanson on 6/5/2024 at 2:45 PM    
Writer placed call to [employer] [employer] to schedule transportation for this pt.  Writer faxed forms for scheduling.  Electronically signed by HIPOLITO Hanson on 6/5/2024 at 3:47 PM    Writer placed call back to [employer]. Avril denny forms were not received via faxed. Writer re-faxed forms for scheduling.  Electronically signed by HIPOLITO Hanson on 6/5/2024 at 4:41 PM    
Writer spoke to patient to verify payor source for nursing facility. Patient states it will be private pay. Joanna mcintosh [facility] notified and reviewing patient for possible admission.   
Self     Signed ABN: N       Payor Name:  MEDICARE   Plan:  MEDICARE PART A AND B   Group:         Payor Name: [employer]  Plan:  [facility]     Worker's Comp Date of Injury:

## 2024-06-05 NOTE — PLAN OF CARE
Problem: Discharge Planning  Goal: Discharge to home or other facility with appropriate resources  6/2/2024 0340 by Dez Calvo, RN  Outcome: Progressing     Problem: ABCDS Injury Assessment  Goal: Absence of physical injury  6/2/2024 0340 by Dez Calvo, RN  Outcome: Progressing     Problem: Safety - Adult  Goal: Free from fall injury  6/2/2024 0340 by Dez Calvo, RN  Outcome: Progressing     
  Problem: Discharge Planning  Goal: Discharge to home or other facility with appropriate resources  6/5/2024 0155 by Flavia Marcos RN  Outcome: Progressing  Flowsheets  Taken 6/4/2024 2239 by Flavia Marcos RN  Discharge to home or other facility with appropriate resources:   Identify barriers to discharge with patient and caregiver   Arrange for needed discharge resources and transportation as appropriate   Refer to discharge planning if patient needs post-hospital services based on physician order or complex needs related to functional status, cognitive ability or social support system  Taken 6/4/2024 1955 by Maura Maloney RN  Discharge to home or other facility with appropriate resources: Identify barriers to discharge with patient and caregiver  6/4/2024 1535 by Kat Fuentes RN  Outcome: Progressing     Problem: Skin/Tissue Integrity  Goal: Absence of new skin breakdown  Description: 1.  Monitor for areas of redness and/or skin breakdown  2.  Assess vascular access sites hourly  3.  Every 4-6 hours minimum:  Change oxygen saturation probe site  4.  Every 4-6 hours:  If on nasal continuous positive airway pressure, respiratory therapy assess nares and determine need for appliance change or resting period.  6/5/2024 0155 by Flavia Marcos RN  Outcome: Progressing  6/4/2024 1535 by Kat Fuentes RN  Outcome: Progressing     Problem: ABCDS Injury Assessment  Goal: Absence of physical injury  6/5/2024 0155 by Flavia Marcos RN  Outcome: Progressing  Flowsheets (Taken 6/4/2024 1959 by Maura Maloney RN)  Absence of Physical Injury: Implement safety measures based on patient assessment  6/4/2024 1535 by Kat Fuentes RN  Outcome: Progressing     Problem: Safety - Adult  Goal: Free from fall injury  6/5/2024 0155 by Flavia Marcos RN  Outcome: Progressing  6/4/2024 1535 by Kat Fuentes RN  Outcome: Progressing     Problem: Cardiovascular - Adult  Goal: Maintains optimal cardiac output 
  Problem: Discharge Planning  Goal: Discharge to home or other facility with appropriate resources  6/5/2024 1722 by Samarai Mcgrath RN  Outcome: Adequate for Discharge  6/5/2024 1104 by Samaria Mcgrath RN  Outcome: Progressing     Problem: Skin/Tissue Integrity  Goal: Absence of new skin breakdown  Description: 1.  Monitor for areas of redness and/or skin breakdown  2.  Assess vascular access sites hourly  3.  Every 4-6 hours minimum:  Change oxygen saturation probe site  4.  Every 4-6 hours:  If on nasal continuous positive airway pressure, respiratory therapy assess nares and determine need for appliance change or resting period.  6/5/2024 1722 by Samaria Mcgrath RN  Outcome: Adequate for Discharge  6/5/2024 1104 by Samaria Mcgrath RN  Outcome: Progressing     Problem: ABCDS Injury Assessment  Goal: Absence of physical injury  6/5/2024 1722 by Samaria Mcgrath RN  Outcome: Adequate for Discharge  6/5/2024 1104 by Samaria Mcgrath RN  Outcome: Progressing     Problem: Safety - Adult  Goal: Free from fall injury  6/5/2024 1722 by Samaria Mcgrath RN  Outcome: Adequate for Discharge  6/5/2024 1104 by Samaria Mcgrath RN  Outcome: Progressing     Problem: Cardiovascular - Adult  Goal: Maintains optimal cardiac output and hemodynamic stability  6/5/2024 1722 by Samaria Mcgrath RN  Outcome: Adequate for Discharge  6/5/2024 1104 by Samaria Mcgrath RN  Outcome: Progressing     Problem: Pain  Goal: Verbalizes/displays adequate comfort level or baseline comfort level  6/5/2024 1722 by Samaria Mcgrath RN  Outcome: Adequate for Discharge  6/5/2024 1104 by Samaria Mcgrath RN  Outcome: Progressing     Problem: Chronic Conditions and Co-morbidities  Goal: Patient's chronic conditions and co-morbidity symptoms are monitored and maintained or improved  6/5/2024 1722 by Samaria Mcgrath RN  Outcome: Adequate for Discharge  6/5/2024 1104 by Samaria Mcgrath RN  Outcome: Progressing     
  Problem: Discharge Planning  Goal: Discharge to home or other facility with appropriate resources  Outcome: Progressing     Problem: Skin/Tissue Integrity  Goal: Absence of new skin breakdown  Description: 1.  Monitor for areas of redness and/or skin breakdown  2.  Assess vascular access sites hourly  3.  Every 4-6 hours minimum:  Change oxygen saturation probe site  4.  Every 4-6 hours:  If on nasal continuous positive airway pressure, respiratory therapy assess nares and determine need for appliance change or resting period.  6/4/2024 1535 by Kat Fuentes RN  Outcome: Progressing     Problem: ABCDS Injury Assessment  Goal: Absence of physical injury  6/4/2024 1535 by Kat Fuentes RN  Outcome: Progressing     Problem: Safety - Adult  Goal: Free from fall injury  6/4/2024 1535 by Kat Fuentes RN  Outcome: Progressing     Problem: Cardiovascular - Adult  Goal: Maintains optimal cardiac output and hemodynamic stability  6/4/2024 1535 by Kat Fuentes RN  Outcome: Progressing     Problem: Pain  Goal: Verbalizes/displays adequate comfort level or baseline comfort level  6/4/2024 1535 by Kat Fuentes RN  Outcome: Progressing     Problem: Chronic Conditions and Co-morbidities  Goal: Patient's chronic conditions and co-morbidity symptoms are monitored and maintained or improved  6/4/2024 1535 by Kat Fuentes RN  Outcome: Progressing     
  Problem: Discharge Planning  Goal: Discharge to home or other facility with appropriate resources  Outcome: Progressing     Problem: Skin/Tissue Integrity  Goal: Absence of new skin breakdown  Description: 1.  Monitor for areas of redness and/or skin breakdown  2.  Assess vascular access sites hourly  3.  Every 4-6 hours minimum:  Change oxygen saturation probe site  4.  Every 4-6 hours:  If on nasal continuous positive airway pressure, respiratory therapy assess nares and determine need for appliance change or resting period.  Outcome: Progressing     Problem: ABCDS Injury Assessment  Goal: Absence of physical injury  Outcome: Progressing     Problem: Safety - Adult  Goal: Free from fall injury  Outcome: Progressing     
  Problem: Discharge Planning  Goal: Discharge to home or other facility with appropriate resources  Outcome: Progressing  Dc barrier: echo and echo results.     Problem: Safety - Adult  Goal: Free from fall injury  Outcome: Progressing  Pt assessed as a fall risk this shift. Remains free from falls and accidental injury at this time. Fall precautions in place, including falling star sign and fall risk band on pt. Floor free from obstacles, and bed is locked and in lowest position. Adequate lighting provided.  Pt encouraged to call before getting OOB for any need.  Bed alarm activated. Will continue to monitor needs during hourly rounding, and reinforce education on use of call light.     Problem: Pain  Goal: Verbalizes/displays adequate comfort level or baseline comfort level  Outcome: Progressing   Pt medicated with pain medication prn.  Assessed all pain characteristics including level, type, location, frequency, and onset.  Non-pharmacologic interventions offered to pt as well.  Pt states pain is tolerable at this time.   
  Problem: Discharge Planning  Goal: Discharge to home or other facility with appropriate resources  Outcome: Progressing  Flowsheets (Taken 6/1/2024 0906)  Discharge to home or other facility with appropriate resources: Identify barriers to discharge with patient and caregiver     Problem: Skin/Tissue Integrity  Goal: Absence of new skin breakdown  Description: 1.  Monitor for areas of redness and/or skin breakdown  2.  Assess vascular access sites hourly  3.  Every 4-6 hours minimum:  Change oxygen saturation probe site  4.  Every 4-6 hours:  If on nasal continuous positive airway pressure, respiratory therapy assess nares and determine need for appliance change or resting period.  Outcome: Progressing     Problem: ABCDS Injury Assessment  Goal: Absence of physical injury  Outcome: Progressing  Flowsheets (Taken 6/1/2024 0909)  Absence of Physical Injury: Implement safety measures based on patient assessment     Problem: Safety - Adult  Goal: Free from fall injury  Outcome: Progressing     
  Problem: Safety - Adult  Goal: Free from fall injury  6/1/2024 0219 by Conchita Lagunas, RN  Outcome: Progressing  Note: Bed in lowest and locked position.  2 bedrails used for pt safety.  Bed alarm in use in pt's room.  Hourly rounds done by staff and RN.  Phone and call light within pt's reach.         Problem: Pain  Goal: Verbalizes/displays adequate comfort level or baseline comfort level  6/1/2024 0219 by Conchita Lagunas, RN  Outcome: Progressing  Note: Patient is able to verbalize needs and concerns.  Patient denies any chest pain at this time.       
  Problem: Safety - Adult  Goal: Free from fall injury  6/4/2024 0412 by Maura Maloney RN  Outcome: Progressing     Problem: Skin/Tissue Integrity  Goal: Absence of new skin breakdown  6/4/2024 0412 by Maura Maloney RN  Outcome: Progressing     Problem: Cardiovascular - Adult  Goal: Maintains optimal cardiac output and hemodynamic stability  6/4/2024 0412 by Maura Maloney RN  Outcome: Progressing     Problem: Pain  Goal: Verbalizes/displays adequate comfort level or baseline comfort level  6/4/2024 0412 by Maura Maloney RN  Outcome: Progressing   Note: Pt medicated with pain medication prn.  Assessed all pain characteristics including level, type, location, frequency, and onset.  Non-pharmacologic interventions offered to pt as well.  Pt states pain is tolerable at this time.      Problem: Chronic Conditions and Co-morbidities  Goal: Patient's chronic conditions and co-morbidity symptoms are monitored and maintained or improved  6/4/2024 0412 by Maura Maloney RN  Outcome: Progressing     
  Problem: Skin/Tissue Integrity  Goal: Absence of new skin breakdown  Description: 1.  Monitor for areas of redness and/or skin breakdown  2.  Assess vascular access sites hourly  3.  Every 4-6 hours minimum:  Change oxygen saturation probe site  4.  Every 4-6 hours:  If on nasal continuous positive airway pressure, respiratory therapy assess nares and determine need for appliance change or resting period.  Outcome: Progressing     Problem: ABCDS Injury Assessment  Goal: Absence of physical injury  Outcome: Progressing     Problem: Safety - Adult  Goal: Free from fall injury  Outcome: Progressing     Problem: Cardiovascular - Adult  Goal: Maintains optimal cardiac output and hemodynamic stability  Outcome: Progressing     Problem: Pain  Goal: Verbalizes/displays adequate comfort level or baseline comfort level  Outcome: Progressing     Problem: Chronic Conditions and Co-morbidities  Goal: Patient's chronic conditions and co-morbidity symptoms are monitored and maintained or improved  Outcome: Progressing     
hemodynamic stability  6/5/2024 1104 by Samaria Mcgrath RN  Outcome: Progressing  6/5/2024 0155 by Flaiva Marcos RN  Outcome: Progressing     Problem: Pain  Goal: Verbalizes/displays adequate comfort level or baseline comfort level  6/5/2024 1104 by Samaria Mcgrath RN  Outcome: Progressing  6/5/2024 0155 by Flavia Marcos RN  Outcome: Progressing  Flowsheets  Taken 6/4/2024 2112 by Maura Maloney RN  Verbalizes/displays adequate comfort level or baseline comfort level: Implement non-pharmacological measures as appropriate and evaluate response  Taken 6/4/2024 2111 by Maura Maloney RN  Verbalizes/displays adequate comfort level or baseline comfort level: Assess pain using appropriate pain scale  Taken 6/4/2024 1952 by Maura Maloney RN  Verbalizes/displays adequate comfort level or baseline comfort level: Assess pain using appropriate pain scale     Problem: Chronic Conditions and Co-morbidities  Goal: Patient's chronic conditions and co-morbidity symptoms are monitored and maintained or improved  6/5/2024 1104 by Samaria Mcgrath RN  Outcome: Progressing  6/5/2024 0155 by Flavia Marcos RN  Outcome: Progressing  Flowsheets  Taken 6/4/2024 2239 by Flavia Marcos RN  Care Plan - Patient's Chronic Conditions and Co-Morbidity Symptoms are Monitored and Maintained or Improved:   Monitor and assess patient's chronic conditions and comorbid symptoms for stability, deterioration, or improvement   Collaborate with multidisciplinary team to address chronic and comorbid conditions and prevent exacerbation or deterioration   Update acute care plan with appropriate goals if chronic or comorbid symptoms are exacerbated and prevent overall improvement and discharge  Taken 6/4/2024 1955 by Maura Maloney RN  Care Plan - Patient's Chronic Conditions and Co-Morbidity Symptoms are Monitored and Maintained or Improved: Monitor and assess patient's chronic conditions and comorbid symptoms for stability, deterioration, or

## 2024-06-05 NOTE — PROGRESS NOTES
[facility]   IN-PATIENT SERVICE   [facility]    HISTORY AND PHYSICAL EXAMINATION            Date:   6/1/2024  Patient name:  Criselda Tinoco  Date of admission:  5/31/2024  1:47 AM  MRN:   [id number]  Account:  [id number]  YOB: 1950  PCP:    Wood St MD  Room:   20812081[facility]  Code Status:    Full Code    Chief Complaint:     Chief Complaint   Patient presents with    Chest Pain     Radiating to the left arm/ sudden onset       History Obtained From:     patient    History of Present Illness:     The patient is a 74 y.o.  Non- / non  female who presents with Chest Pain (Radiating to the left arm/ sudden onset)   and she is admitted to the hospital for the management of      Criselda Tinoco is a 74 y.o. Non- / non  female who presents with Chest Pain (Radiating to the left arm/ sudden onset) and is admitted to the hospital for the management of Angina pectoris (HCC).  Medical history significant for HTN, HLD, nonobstructive CAD, atrial fibrillation and chronic DVT on Eliquis, history of CVA with residual left-sided weakness.  Cervical kyphosis with cervical spine fusion, chronic neck pain.  Patient has been residing in long-term nursing facility since August 2023 after cervical fusion. She was sent to ED by nursing facility for chest pain/pressure radiating to left arm.  Describes the pain as intermittent. Relieved with nitroglycerin and 324 mg aspirin administered by EMS.  Pain had resolved prior to arrival in ED but reoccurred requiring additional nitroglycerin sublingual tab while awaiting results of testing.  Endorses nausea without vomiting.  Denies shortness of breath. EKG NSR, no ST changes. Troponin 16-17, . Last echo April 2023 EF> 55%. Last stress test was December 2023 at [facility] which revealed Left ventricular ejection fraction 75%. Normal stress rest myocardial SPECT perfusion scan of the heart. Denies fever, chills, 
   05/31/24 1053   Encounter Summary   Encounter Overview/Reason Attempted Encounter   Service Provided For Patient not available  (pt sleeping)       
   06/05/24 1531   Encounter Summary   Encounter Overview/Reason Spiritual/Emotional Needs   Service Provided For Patient   Referral/Consult From Rounding   Complexity of Encounter Low   Spiritual/Emotional needs   Type Spiritual Support   Assessment/Intervention/Outcome   Assessment Unable to assess  (patient sleeping; no family present)   Intervention Prayer (assurance of)/[place]       
Criselda Tinoco requires a patient lift for the transfer between bed and a chair, wheelchair, and commode.   Without the use of the lift, the patient would be bed confined.   Diagnosis CVA, generalized weakness and decreased functional mobility    Electronically signed by Yolande Lamar RN on 6/5/2024 at 2:48 PM   
Date:   6/2/2024  Patient name: Criselda Tinoco  Date of admission:  5/31/2024  1:47 AM  MRN:   [id number]  YOB: 1950  PCP: Wood St MD    Reason for Admission: Angina pectoris (HCC) [I20.9]  Chest pain, unspecified type [R07.9]    Cardiology follow-up: Chest pain        Referring physician Dr. Wood St     Impression     Admission 5/31/2024 chest pain like a pressure, normal ECG normal high-sensitivity troponin  Left shoulder pain x-ray showed moderate left glenohumeral osteoarthritis, diffuse osteopenia  No previous history of coronary intervention  Hypertension  Hyperlipidemia, under control, cholesterol HDL ratio 2.2, cholesterol 111, HDL 50, LDL 47, triglycerides 70  Congestive heart failure diastolic  Very poor mobility since cervical spine fusion 7/26/2023, gait instability  Left-sided weakness  Remote healed left-sided rib fractures  History of DVT on Eliquis     Past surgical history include   Oophorectomy, hysterectomy, left wrist fracture surgery colonoscopies, EGD, abdominal surgeries for adhesions, lumbar fusion, back surgery, cervical fusion 7/26/2023     Investigation workup    ECG 5/31/2024     Normal sinus rhythm normal ECG heart rate 70, no diagnostic change from March 18, 2024     Chest x-ray 5/31/2024  Normal examination    X-ray left shoulder 6/1/2024     1. Moderate left glenohumeral osteoarthrosis.  2. Mild degenerative change of the left AC joint.  Diffuse osteopenia.  3. Remote healed left-sided rib fracture deformities.  4. Spinal fusion hardware overlying the cervical spine.     2D echo 4/12/2023  Normal LV size ejection fraction more than 55%  No significant valvular abnormality     Lexiscan Myoview stress test 12/26/2023  Normal stress rest myocardial SPECT perfusion scan of the heart  Low risk for cardiovascular event     Lexiscan Myoview stress test 7/11/2022  No evidence of reversible ischemia or infarction ejection fraction 67%    History of present 
Date:   6/4/2024  Patient name: Criselda Tinoco  Date of admission:  5/31/2024  1:47 AM  MRN:   [id number]  YOB: 1950  PCP: Wood St MD    Reason for Admission: Angina pectoris (HCC) [I20.9]  Chest pain, unspecified type [R07.9]    Cardiology follow-up: Chest pain        Referring physician Dr. Wood St     Impression     Admission 5/31/2024 chest pain like a pressure, normal ECG normal high-sensitivity troponin  Left shoulder pain x-ray showed moderate left glenohumeral osteoarthritis, diffuse osteopenia  No previous history of coronary intervention  Hypertension  Hyperlipidemia, under control, cholesterol HDL ratio 2.2, cholesterol 111, HDL 50, LDL 47, triglycerides 70  Congestive heart failure diastolic  Very poor mobility since cervical spine fusion 7/26/2023, gait instability  Left-sided weakness  Remote healed left-sided rib fractures  History of DVT on Eliquis     Past surgical history include   Oophorectomy, hysterectomy, left wrist fracture surgery colonoscopies, EGD, abdominal surgeries for adhesions, lumbar fusion, back surgery, cervical fusion 7/26/2023    Investigation workup     ECG 5/31/2024  Normal sinus rhythm normal ECG heart rate 70, no diagnostic change from March 18, 2024     Chest x-ray 5/31/2024  Normal examination     X-ray left shoulder 6/1/2024      1. Moderate left glenohumeral osteoarthrosis.  2. Mild degenerative change of the left AC joint.  Diffuse osteopenia.  3. Remote healed left-sided rib fracture deformities.  4. Spinal fusion hardware overlying the cervical spine.     2D echo 4/12/2023  Normal LV size ejection fraction more than 55%  No significant valvular abnormality     Lexiscan Myoview stress test 12/26/2023  Normal stress rest myocardial SPECT perfusion scan of the heart  Low risk for cardiovascular event     Lexiscan Myoview stress test 7/11/2022  No evidence of reversible ischemia or infarction ejection fraction 67%     History of present 
Dr Gomez aware echo not done today. Writer double checked orders and not ordered anymore, \"wondered if you cancelled it today.\" Dr Gomez only cancelled stress but not the echo. Writer asked if want the echo reordered, not sure if when the stress was cancelled stress may have accidentally cancel the echo? Dr Gomez states will decide once he speaks and sees the pt soon. No new orders.  
Dr. Gomez (Cardiology) put on patient list for consult.  
Dr. Laguerre (Orthopedic Surgery) notified of consult.  
NATALIE Landry speaks with writer to call and clarify if Dr Gomez would want a stress test. Negative trops, last stress is less than 6 months ago. Dr Gomez notified and cancels stress test. Unable to cancel order in computer dt a linked appointment, so Johnie in stress notified and said he will take care of it.  
Occupational Therapy  [facility]   Occupational Therapy Evaluation  Date: 24  Patient Name: Criselda Tinoco       Room: -  MRN: [id number]  Account: [id number]   : 1950  (74 y.o.) Gender: female     Discharge Recommendations:  Further Occupational Therapy is recommended upon facility discharge.    OT Equipment Recommendations  Other: TBD    Referring Practitioner: Wood St MD  Diagnosis: Angina pectoris   Additional Pertinent Hx: Per chart: Criselda Tinoco is a 74 y.o. female who presents with chest pain.  Patient resides [facility].  She was complaining of some nausea earlier in the night for which she received Zofran.  She then began planing of some left-sided chest pain that radiated to the left arm and [facility] called 911.  Prior to their arrival they gave her 1 sublingual nitroglycerin which she states did help.  EMS gave her 324 mg of aspirin.  They obtained EKGs and brought patient to the ER.  Patient states that she still having some left-sided chest pain with left arm pain but it is better than previously.  States that she has chronic neck pain.  Denies any abdominal pain.  Denies any nausea.  States this feels similar to when she had a heart attack back in     Treatment Diagnosis: impaired self care tasks    Past Medical History:  has a past medical history of Allergic rhinitis, cause unspecified, Back pain, Bowel obstruction (HCC), C. difficile diarrhea, CAD (coronary artery disease), Cardiac murmur, Cellulitis, Cerebral artery occlusion with cerebral infarction (HCC), COVID-19, Diverticulosis of colon (without mention of hemorrhage), GERD (gastroesophageal reflux disease), History of blood transfusion, History of CHF (congestive heart failure), History of MI (myocardial infarction), History of ovarian cyst, History of peritonitis, HTN (hypertension), Hx of blood clots, Hyperlipidemia, Intestinal or peritoneal adhesions with 
Physical Therapy  Facility/Department: [facility] PROGRESSIVE CARE  Daily Treatment Note  NAME: Criselda Tinoco  : 1950  MRN: [id number]    Date of Service: 2024    Discharge Recommendations:  Continue to assess pending progress        Patient Diagnosis(es): The encounter diagnosis was Chest pain, unspecified type.      Activity Tolerance: Patient limited by endurance;Patient limited by fatigue     Plan    Physical Therapy Plan  Specific Instructions for Next Treatment: increase standing tolerance on the Emily Stedy, instruct in balance activities and general strengthening exercises- uses stand up lift at nursing home- use Emily Stedy for transfer to bedside chair or W/C, progress to W/C mobility; co treat w/ OT  Current Treatment Recommendations: Wheelchair mobility training;Functional mobility training;Transfer training;Strengthening;Balance training;Endurance training;Positioning;Equipment evaluation, education, & procurement;Safety education & training;Patient/Caregiver education & training;Co-Treatment;Therapeutic activities     Restrictions  Restrictions/Precautions  Restrictions/Precautions: Fall Risk (troponins 17 on 2024, IV left dorsal forearm)  Required Braces or Orthoses?: No  Implants present? : Metal implants (left wrist and low back fusion, cervical fusion)  Position Activity Restriction  Other position/activity restrictions: up w/ assist- use lift equipment; hx of CVA with L residual deficits     Subjective    Pt sitting up in a chair upon entering room  Pain: patient did not rate any pain.    Cognition  Overall Cognitive Status: Exceptions  Arousal/Alertness: Appears intact  Following Commands: Follows one step commands consistently  Attention Span: Appears intact  Memory: Decreased recall of recent events  Safety Judgement: Decreased awareness of need for assistance;Decreased awareness of need for safety  Problem Solving: Assistance required to generate solutions;Assistance required to 
Pt complaining of severe headache that radiates to the right thumb. Pt was given prn Oxycodone 5 mg oral.  Ayah Talbert NP at bedside to assess pt. Per NP, RN to continue to monitor for any neurological changes.   
Report given to MEENU Alejandro [facility]-S[facility].   All questions answered.     Pt transferred with all her belongings.   
Vasile from [place] updated. Questions answered.  
Writer responded to consult to see patient; visit with patient and her ; patient tired and in pain; patient provided medical update;  talked about his  service and his spiritual beliefs; listening presence and support; welcomed prayer     05/31/24 1950   Encounter Summary   Encounter Overview/Reason Spiritual/Emotional Needs   Service Provided For Patient and family together   Referral/Consult From [facility]   Support System Spouse   Last Encounter  05/31/24   Complexity of Encounter Moderate   Spiritual/Emotional needs   Type Spiritual Support   Assessment/Intervention/Outcome   Assessment Anxious;Coping;Hopeful;Impaired resilience;Powerlessness   Intervention Active listening;Discussed illness injury and it’s impact;Explored/Affirmed feelings, thoughts, concerns;Prayer (assurance of)/[place];Sustaining Presence/Ministry of presence   Outcome Comfort;Coping;Engaged in conversation;Expressed feelings, needs, and concerns;Expressed Gratitude;Receptive       
Total assist for foot level care  Toileting: Dependent/Total, Based on clinical judgement  Additional Comments: Patient continues to be limited due to impaired standing balance/tolerance, generalized weakness, LUE deficits, and low activity tolerance affecting her ability to complete self care tasks        Balance  Balance  Sitting Balance: Stand by assistance  Standing Balance: Minimal assistance (SBA - CGA for static standing; Jennifer for dynamic standing tasks)  Standing Balance  Time: 2 min 8 seconds; 3 min 48 seconds  Activity: functional transfer, functional activities while standing in [facility]  Comment: 1-2 UE supported in [facility]    Transfers/Mobility  Bed mobility  Supine to Sit: Moderate assistance  Sit to Supine: Unable to assess  Scooting: Moderate assistance (L hip scooting towards EOB)  Bed Mobility Comments: HOB elevated, significant increase in time  Transfers  Sit to stand: Stand by assistance  Stand to sit: Contact guard assistance  Transfer Comments: Good hand/foot placement for [facility]    Functional Mobility  Functional Mobility Comments: Not appropriate at this time      OT Exercises  A/AROM Exercises: Writer faciltiated patient's engagement in AROM exercises, in all available planes. 1 set x 15 reps, to improve overall functional outcomes during ADLs  Dynamic Standing Balance Exercises: Writer facilitated patient's engagement in functional activities, including marches and targeted reaching outside base of support, to improve standing balance/overall strength/endurance during her daily routine. Jennifer for standing during standing marches, completed for approx. 60 seconds. Targeted reaching completed with BUEs, crossing midline as appropriate, for approx. 2 minutes and 45 seconds. Good tolerance overall    Patient Education  Patient Education  Education Given To: Patient  Education Provided: Role of Therapy, Plan of Care, Precautions, Home Exercise Program  Education Method: Verbal  Barriers 
noted    Patient Education  Patient Education  Education Given To: Patient  Education Provided: Role of Therapy, Plan of Care, Precautions, ADL Adaptive Strategies, Transfer Training (home safety)  Education Method: Verbal, Printed Information/Hand-outs  Barriers to Learning: Cognition  Education Outcome: Verbalized understanding, Continued education needed    Goals  Short Term Goals  Time Frame for Short Term Goals: By discharge, pt will  Short Term Goal 1: perform functional transfers during self care tasks with Mod A, least restrictive device, and Good safety  Short Term Goal 2: perform upper body ADLs with setup  Short Term Goal 3: perform lower body ADLs with Mod A and adaptive equipment as needed  Short Term Goal 4: actively participate in 15+ minutes of therapeutic exercise/functional activity to increase overall strength/endurance needed for ADLs/IADLs  Short Term Goal 5: verbalize/demonstrate Good understanding of fall prevention techs to maximize safety and independence during self care tasks  Occupational Therapy Plan  Times Per Week: 4-6  Current Treatment Recommendations: Strengthening, Balance training, Functional mobility training, ROM, Endurance training, Pain management, Safety education & training, Patient/Caregiver education & training, Equipment evaluation, education, & procurement, Self-Care / ADL, Home management training, Coordination training    Assessment  Activity Tolerance  Activity Tolerance: Patient Tolerated treatment well  Assessment  Performance deficits / Impairments: Decreased functional mobility , Decreased ADL status, Decreased safe awareness, Decreased strength, Decreased endurance, Decreased balance, Decreased high-level IADLs, Decreased fine motor control, Decreased coordination, Decreased posture, Decreased sensation  Treatment Diagnosis: impaired self care tasks  Prognosis: Good  Decision Making: Medium Complexity  Discharge Recommendations: Patient would benefit from 
rail w/ SBA for change in position  Short Term Goal 4: pt to demonstrate transfers supine <> sit w/ min x 1  Short Term Goal 5: pt to demonstrate good sitting balance at the EOB w/ SBA x 1 and increase sitting tolerance to 15 minutes to engage the core muscles  Short Term Goal 6: pt to demonstrate sit <> stand transfers w/ CGA x 1 using the Emily Stedy  Short Term Goal 7: pt to demonstrate bilateral knee control on the Emily Stedy and increase standing tolerance to 3 minutes w/ CGA x 1  Short Term Goal 8: pt to demonstrate W/C mobility w/ SBA and able to propel 30- 50' using right arm and right leg  Patient Goals   Patient Goals : return to [facility]    Education  Patient Education  Education Given To: Patient  Education Provided: Role of Therapy;Plan of Care;Home Exercise Program;Transfer Training  Education Method: Demonstration;Verbal  Barriers to Learning: None  Education Outcome: Demonstrated understanding    AM-PAC - Mobility    AM-PAC Basic Mobility - Inpatient   How much help is needed turning from your back to your side while in a flat bed without using bedrails?: A Lot  How much help is needed moving from lying on your back to sitting on the side of a flat bed without using bedrails?: A Lot  How much help is needed moving to and from a bed to a chair?: Total  How much help is needed standing up from a chair using your arms?: Total  How much help is needed walking in hospital room?: Total  How much help is needed climbing 3-5 steps with a railing?: Total  AM-PAC Inpatient Mobility Raw Score : 8  AM-PAC Inpatient T-Scale Score : 28.52  Mobility Inpatient CMS 0-100% Score: 86.62  Mobility Inpatient CMS G-Code Modifier : CM         Therapy Time   Individual Concurrent Group Co-treatment   Time In 1027         Time Out 1118         Minutes 51                 Avril Armendariz, PTA           
(postinfection) (HCC)     Restless legs syndrome (RLS)     GERD (gastroesophageal reflux disease)     Hypertension     Hyperlipidemia     Other abnormal glucose     Atherosclerosis     Allergic rhinitis     Vitamin D deficiency     Major depression     Degenerative joint disease (DJD) of hip     Peripheral edema     Injury of foot, left     Facial droop     CVA (cerebral vascular accident) ([facility])     Bradycardia     Ataxia     Aphasia     TIA (transient ischemic attack)     Need for prophylactic vaccination and inoculation against cholera alone     Osteopenia     Lumbago     Meralgia paresthetica of right side     Lumbar degenerative disc disease     Spondylosis of lumbar region without myelopathy or radiculopathy     Blood poisoning     Cellulitis of left lower extremity     Encounter for medication monitoring     Deep vein thrombosis (DVT) of right lower extremity ([facility])     Chronic deep vein thrombosis (DVT) of proximal vein of both lower extremities ([facility])     Chronic diastolic heart failure ([facility])     Neurogenic claudication due to lumbar spinal stenosis     Stage 3 chronic kidney disease ([facility])     Type 2 diabetes mellitus with circulatory disorder, without long-term current use of insulin ([facility])     Chronic deep vein thrombosis (DVT) of popliteal vein of right lower extremity ([facility])     Closed fracture of left wrist     B12 deficiency     Iron deficiency anemia secondary to inadequate dietary iron intake     Closed head injury     Scalp laceration     Abnormal finding on imaging     Other irritable bowel syndrome     Frequent falls     Double vision     Muscle soreness     Peptic ulcer     Melena     PUD (peptic ulcer disease)     Absolute anemia     Acute postoperative anemia due to expected blood loss     Acute renal failure ([facility])     Clostridium difficile infection     Acquired spondylolisthesis     Spinal stenosis of lumbar region with neurogenic claudication     Acute deep vein thrombosis (DVT) of proximal vein 
fracture     Nondisplaced fracture of sternal end of left clavicle, initial encounter for closed fracture     Erysipelas of right lower extremity     Closed fracture of body of sternum     Calculus of gallbladder without cholecystitis without obstruction     Leukocytosis     Type 2 diabetes mellitus with stage 3 chronic kidney disease ([facility])     Allergy to sulfa drugs     Bilateral cellulitis of lower leg     Lymphedema of both lower extremities     Cerebral infarction, unspecified ([facility])     Chronic embolism and thrombosis of unspecified deep veins of proximal lower extremity, bilateral ([facility])     Other specified disorders of bone density and structure, unspecified site     Repeated falls     Fall as cause of accidental injury in home as place of occurrence, initial encounter     Cellulitis     Chronic anticoagulation     Acute on chronic diastolic (congestive) heart failure ([facility])     History of DVT (deep vein thrombosis)     Prediabetes     History of CVA with residual deficit     Cervical myopathy     Hypervolemia     Congenital kyphosis of cervical region     Status post surgery     Cervical spondylosis     Azotemia     Spinal stenosis in cervical region     Other secondary kyphosis, cervical region     Chronic obstructive pulmonary disease, unspecified     BIN (acute kidney injury) ([facility])     Left-sided weakness     Hyperkalemia     History of stroke     Acute encephalopathy     Angina pectoris ([facility])      Plan of Treatment:   Medications reviewed  1: Chest pain multiple episodes, hypertension negative stress test December 2023 , negative ECG normal cardiac markers continue current dose of aspirin, carvedilol 25 mg twice a day, Lipitor 40 mg daily  2: Hypertension continue current dose of amlodipine 10 mg a day, hydralazine 25 mg 3 times a day  Bedside physiotherapy  Discussed with the charge nurse  Patient's  not happy to take her back to the [facility] because he is not happy with their 
Treatment:   Medications reviewed  1: Chest pain multiple episodes, hypertension negative stress test December 2023 , negative ECG normal cardiac markers continue current dose of aspirin, carvedilol 25 mg twice a day, Lipitor 40 mg daily  No episode of chest pain since admission  2: Hypertension continue current dose of amlodipine 10 mg a day, hydralazine 25 mg 3 times a day  3: Left shoulder pain severe osteoarthritis  Discussed case with Dr. Sagrario Navarro, orthopedic consult, start oxycodone  Okay to stop Eliquis for couple of days if patient is going to have left shoulder steroid injection  Patient cannot afford going to [facility]-care facility  She wants to go home  Appointment with orthopedic surgeon as outpatient Dr. Laguerre         Electronically signed by Newton Gomez MD on 6/5/2024 at 9:33 AM  
alcohol use.  Drug Use:  reports no history of drug use.    Family History:     Family History   Problem Relation Age of Onset    Stroke Mother     Diabetes Mother     Heart Disease Mother     High Blood Pressure Mother     Heart Disease Father     Heart Disease Brother     High Blood Pressure Brother     Heart Disease Maternal Grandmother     High Blood Pressure Sister        Review of Systems:     Positive and Negative as described in HPI.    CONSTITUTIONAL:  negative for fevers, chills, sweats, fatigue, weight loss  HEENT:  negative for vision, hearing changes, runny nose, throat pain  RESPIRATORY:  negative for shortness of breath, cough, congestion, wheezing.  CARDIOVASCULAR: Positive for chest pain GASTROINTESTINAL:  negative for nausea, vomiting, diarrhea, constipation, change in bowel habits, abdominal pain   GENITOURINARY:  negative for difficulty of urination, burning with urination, frequency   INTEGUMENT:  negative for rash, skin lesions, easy bruising   HEMATOLOGIC/LYMPHATIC:  negative for swelling/edema   ALLERGIC/IMMUNOLOGIC:  negative for urticaria , itching  ENDOCRINE:  negative increase in drinking, increase in urination, hot or cold intolerance  MUSCULOSKELETAL:  negative joint pains, muscle aches, swelling of joints  NEUROLOGICAL:  negative for headaches, dizziness, lightheadedness, numbness, pain, tingling extremities  BEHAVIOR/PSYCH:  negative for depression, anxiety    Physical Exam:   /71   Pulse 75   Temp 98.2 °F (36.8 °C)   Resp 18   Ht 1.6 m (5' 3\")   Wt 85.1 kg (187 lb 9.8 oz)   SpO2 92%   BMI 33.23 kg/m²   Temp (24hrs), Av.9 °F (36.6 °C), Min:97.1 °F (36.2 °C), Max:98.4 °F (36.9 °C)    No results for input(s): \"POCGLU\" in the last 72 hours.    Intake/Output Summary (Last 24 hours) at 2024 0822  Last data filed at 2024 0624  Gross per 24 hour   Intake 600 ml   Output 230 ml   Net 370 ml           General Appearance:  alert, well appearing, and in no acute 
Goal 8: pt to demonstrate W/C mobility w/ SBA and able to propel 30- 50' using right arm and right leg  Patient Goals   Patient Goals : return to [facility]       Education  Patient Education  Education Given To: Patient  Education Provided: Role of Therapy;Plan of Care  Education Method: Demonstration;Verbal  Education Outcome: Continued education needed      Therapy Time   Individual Concurrent Group Co-treatment   Time In 1411         Time Out 1443         Minutes 32         Timed Code Treatment Minutes: 12 Minutes       Dominga Watson, PT         
to ausculation, no wheezing, rales or rhonchi, normal effort  Cardiovascular: normal rate, regular rhythm, no murmur, gallop, rub.  Abdomen: Soft, nontender, nondistended, normal bowel sounds, no hepatomegaly or splenomegaly  Neurologic: There are no new focal motor or sensory deficits, normal muscle tone and bulk, no abnormal sensation, normal speech, cranial nerves II through XII grossly intact  Skin: No gross lesions, rashes, bruising or bleeding on exposed skin area  Extremities:  peripheral pulses palpable, no pedal edema or calf pain with palpation  Psych: normal affect     Investigations:      Laboratory Testing:  Recent Results (from the past 24 hour(s))   CBC    Collection Time: 06/02/24  5:26 AM   Result Value Ref Range    WBC 8.8 3.5 - 11.0 k/uL    RBC 3.57 (L) 4.0 - 5.2 m/uL    Hemoglobin 11.5 (L) 12.0 - 16.0 g/dL    Hematocrit 34.1 (L) 36 - 46 %    MCV 95.5 80 - 100 fL    MCH 32.1 26 - 34 pg    MCHC 33.6 31 - 37 g/dL    RDW 13.3 11.5 - 14.9 %    Platelets 257 150 - 450 k/uL    MPV 7.8 6.0 - 12.0 fL   Basic Metabolic Panel w/ Reflex to MG    Collection Time: 06/02/24  5:26 AM   Result Value Ref Range    Sodium 143 135 - 144 mmol/L    Potassium 3.8 3.7 - 5.3 mmol/L    Chloride 105 98 - 107 mmol/L    CO2 27 20 - 31 mmol/L    Anion Gap 11 9 - 17 mmol/L    Glucose 114 (H) 70 - 99 mg/dL    BUN 24 (H) 8 - 23 mg/dL    Creatinine 0.8 0.5 - 0.9 mg/dL    Est, Glom Filt Rate 78 >60 mL/min/1.73m2    Calcium 9.1 8.6 - 10.4 mg/dL       Imaging/Diagnostics:    XR SHOULDER LEFT (MIN 2 VIEWS)  Narrative: EXAMINATION:  3 XRAY VIEWS OF THE LEFT SHOULDER    6/1/2024 10:24 am    COMPARISON:  None.    HISTORY:  ORDERING SYSTEM PROVIDED HISTORY: pain  TECHNOLOGIST PROVIDED HISTORY:  pain  Reason for Exam: pain    74-year-old female with left shoulder pain    FINDINGS:  Atherosclerotic calcification of the aorta and branch vasculature.  Moderate  left glenohumeral osteoarthrosis.  Mild degenerative change of the left AC  joint. 
A-fib and DVT      MAIK CONCEPCION NP  5/31/2024  6:50 AM      Please note that this chart was generated using voice recognition Dragon dictation software.  Although every effort was made to ensure the accuracy of this automated transcription, some errors in transcription may have occurred.    [address] [address].   Phone [phone]           
plateud , patient had stress test in December 2023 that was low risk, cardiology consulted, patient currently denies any chest pain  History of A-fib, on Eliquis  Chronic DVT on Eliquis  History of COPD currently compensated on bronchodilators  Hypertension, blood pressure suboptimally controlled patient on multiple antihypertensives.  Monitor if continues to be hypertensive will increase dose of hydralazine next  History of CVA with residual left-sided weakness  S/p C-spine surgery in July 2023  Hypokalemia will replace potassium  Anemia of chronic disease  DVT prophylaxis with Eliquis  Will discharge once cleared by cardiology.    6/1  Patient seen and examined  Stress test was canceled by cardiology  Patient currently denies any chest pain  Complaining of left shoulder pain, states that pain is better going  Since last 1 year and getting worse, will check x-ray of the left shoulder  Vitals have been stable, discharge once cleared by cardiology    6/4  Patient seen and examined  His vitals have been stable  Chest pain resolved  X-ray shoulder nonacute  Discharge pending placement PT  Consultations:   IP CONSULT TO CARDIOLOGY  IP CONSULT TO INTERNAL MEDICINE  IP CONSULT TO SOCIAL WORK  IP CONSULT TO SPIRITUAL SERVICES     Patient is admitted as inpatient status because of co-morbidities listed above, severity of signs and symptoms as outlined, requirement for current medical therapies and most importantly because of direct risk to patient if care not provided in a hospital setting.    Sagrario Navarro MD  6/4/2024  8:17 AM    Copy sent to Wood Reese MD    Please note that this chart was generated using voice recognition Dragon dictation software.  Although every effort was made to ensure the accuracy of this automated transcription, some errors in transcription may have occurred.   
WORK  IP CONSULT TO SPIRITUAL SERVICES     Patient is admitted as inpatient status because of co-morbidities listed above, severity of signs and symptoms as outlined, requirement for current medical therapies and most importantly because of direct risk to patient if care not provided in a hospital setting.    Sagrario Navarro MD  6/3/2024  8:38 AM    Copy sent to Wood Resee MD    Please note that this chart was generated using voice recognition Dragon dictation software.  Although every effort was made to ensure the accuracy of this automated transcription, some errors in transcription may have occurred.

## 2024-06-06 ENCOUNTER — TELEPHONE (OUTPATIENT)
Dept: INTERNAL MEDICINE CLINIC | Age: 74
End: 2024-06-06

## 2024-06-06 ENCOUNTER — HOSPITAL ENCOUNTER (OUTPATIENT)
Age: 74
Setting detail: SPECIMEN
Discharge: HOME | End: 2024-06-06

## 2024-06-06 ENCOUNTER — CARE COORDINATION (OUTPATIENT)
Dept: CASE MANAGEMENT | Age: 74
End: 2024-06-06

## 2024-06-06 DIAGNOSIS — I20.9 ANGINA PECTORIS (HCC): Primary | ICD-10-CM

## 2024-06-06 NOTE — CARE COORDINATION
am how she is doing, she responds \"not good\" and was tearful.  She was taken home via stretcher yesterday, was able to sleep ok last night.  She is overwhelmed, states she can't do anything for herself there at home. She denies any chest pains, shortness of breath and c/o left shoulder pain.  Per patient, daughter came this am and set up her medications, was not familiar with what she is taking.  She also voiced concern that her daughter is going out of town this weekend as well.  Inquired if I could speak with her spouse, she advised that I call his phone as he did not come in when she called for him.  TC to spouse, left  requesting return call.  TC placed to daughter, she did set up medications for patient and has all medications.  She did not  any pain medication for her, states she does not function well at all on pain medication.  Patient was seen by ortho, plan is for injections to shoulder for pain management.  Patient also follows with Dr. St but notes indicate that [facility] plans on setting her up with a visiting physician group.  Per daughter, spouse will be taking patient to appointments so he schedules them.  She does have upcoming appointment with her neurosurgeon in 3 weeks. Adilene lift to be delivered today and per daughter, she was measured while at SNF for wheelchair, was not sure what type it was or where it was to come from.    Discharge instructions reviewed, medications reviewed with daughter, patient has all medications except for pain medication which they decided to not get.  [facility] is coming later today and [facility] is delivering adilene lift this afternoon. Spouse is able to help patient to the commode, plans are to set up her bed in living room.  Currently, they feel they have all needed equipment, they have all medications and deny any additional needs at this time.  Expressed to both daughter and patient if any further needs to feel free to reach out.   I did reach out to [facility]

## 2024-06-10 RX ORDER — GABAPENTIN 100 MG/1
200 CAPSULE ORAL 2 TIMES DAILY
Qty: 90 CAPSULE | Refills: 3 | Status: SHIPPED | OUTPATIENT
Start: 2024-06-10 | End: 2024-09-08

## 2024-06-10 NOTE — TELEPHONE ENCOUNTER
Patient called in requesting refill of gabapentin. Informed her we would need an appointment prior to refilling any medication due to her not being seen for a while.   Patient denied scheduling at this time and will call back to schedule at another time.

## 2024-06-10 NOTE — TELEPHONE ENCOUNTER
Patient scheduled appointment with soonest available on 7/17.  Requesting refill, will you approve?  Medication pending

## 2024-06-11 ENCOUNTER — CARE COORDINATION (OUTPATIENT)
Dept: CASE MANAGEMENT | Age: 74
End: 2024-06-11

## 2024-06-11 DIAGNOSIS — I82.401 DEEP VEIN THROMBOSIS (DVT) OF RIGHT LOWER EXTREMITY, UNSPECIFIED CHRONICITY, UNSPECIFIED VEIN (HCC): ICD-10-CM

## 2024-06-11 DIAGNOSIS — F33.1 MAJOR DEPRESSIVE DISORDER, RECURRENT, MODERATE (HCC): ICD-10-CM

## 2024-06-11 RX ORDER — FUROSEMIDE 20 MG/1
20 TABLET ORAL DAILY
Qty: 60 TABLET | Refills: 3 | Status: SHIPPED | OUTPATIENT
Start: 2024-06-11

## 2024-06-11 RX ORDER — HYDROXYZINE HYDROCHLORIDE 10 MG/1
10 TABLET, FILM COATED ORAL 3 TIMES DAILY PRN
Qty: 30 TABLET | Refills: 0 | Status: SHIPPED | OUTPATIENT
Start: 2024-06-11 | End: 2024-06-21

## 2024-06-11 RX ORDER — HYDRALAZINE HYDROCHLORIDE 25 MG/1
25 TABLET, FILM COATED ORAL 3 TIMES DAILY
Qty: 90 TABLET | Refills: 2 | Status: SHIPPED | OUTPATIENT
Start: 2024-06-11

## 2024-06-11 RX ORDER — ATORVASTATIN CALCIUM 40 MG/1
40 TABLET, FILM COATED ORAL DAILY
Qty: 30 TABLET | Refills: 3 | Status: SHIPPED | OUTPATIENT
Start: 2024-06-11

## 2024-06-11 RX ORDER — AMLODIPINE BESYLATE 10 MG/1
10 TABLET ORAL DAILY
Qty: 30 TABLET | Refills: 3 | Status: SHIPPED | OUTPATIENT
Start: 2024-06-11

## 2024-06-11 RX ORDER — CARVEDILOL 25 MG/1
25 TABLET ORAL 2 TIMES DAILY WITH MEALS
Qty: 60 TABLET | Refills: 3 | Status: SHIPPED | OUTPATIENT
Start: 2024-06-11

## 2024-06-11 RX ORDER — POTASSIUM CHLORIDE 750 MG/1
20 TABLET, FILM COATED, EXTENDED RELEASE ORAL DAILY
Qty: 60 TABLET | Refills: 2 | Status: SHIPPED | OUTPATIENT
Start: 2024-06-11

## 2024-06-11 RX ORDER — BACLOFEN 10 MG/1
10 TABLET ORAL 2 TIMES DAILY
Qty: 90 TABLET | Refills: 2 | Status: SHIPPED | OUTPATIENT
Start: 2024-06-11

## 2024-06-11 RX ORDER — SODIUM BICARBONATE 650 MG/1
650 TABLET ORAL 2 TIMES DAILY
Qty: 60 TABLET | Refills: 1 | Status: SHIPPED | OUTPATIENT
Start: 2024-06-11

## 2024-06-11 RX ORDER — SERTRALINE HYDROCHLORIDE 25 MG/1
25 TABLET, FILM COATED ORAL 3 TIMES DAILY
Qty: 30 TABLET | Refills: 3 | Status: SHIPPED | OUTPATIENT
Start: 2024-06-11

## 2024-06-11 RX ORDER — BUSPIRONE HYDROCHLORIDE 5 MG/1
5 TABLET ORAL 3 TIMES DAILY
Qty: 90 TABLET | Refills: 3 | Status: SHIPPED | OUTPATIENT
Start: 2024-06-11

## 2024-06-11 NOTE — TELEPHONE ENCOUNTER
MEENU Batres from [facility] called regarding Criselda's medications.   Criselda was previously admitted to [facility], she was recently admitted to [facility] 5/31-6/5/24. She is now home and under the care of [facility] - but she does not have any medications in her home. She was not sent home with medications or prescriptions from her admission.     Gisel advise of the medications needing prescriptions and pt would like them sent to [facility] on [address].   I have pended the medications, please review and advise.

## 2024-06-11 NOTE — CARE COORDINATION
Care Transitions Note  Follow Up Call     Patient Current Location:  Home: [address] [place] [address] [address]  *[facility]*  [facility] [address]    Care Transition Nurse contacted the patient by telephone. Verified name and  as identifiers.    Additional needs identified to be addressed with provider   No needs identified                 Method of communication with provider: none.    Care Summary Note: Spoke with patient for follow up transitional call today. Patient was discharged from [facility] after being seen for chest pain.  Patient had been at [facility] for rehab for quite some time, since 2023.  She was out of skilled days and would have to have paid out of pocket to return to facility.  Patient discharged to home with spouse last week with [facility] care to follow.  She is chair bound, has jason lift in the home to assist with getting up.  She states her  will just pull her up and she can maneuver with holding on to him to the commode.  She denies any chest pains, shortness of breath, cough or other cardiac related issues.  She has shoulder pain and is using TENS unit to help with pain management.  She was ordered pain medication but daughter did not , states she does not do well with pain medication.  [facility] home nurse was out today, patient said her medications that daughter set up were not correct and was missing medication, she said she was out of her Eliquis, has been taking her husbands in the interim.  Home care nurse was calling to get her refills and straighten out her medications.  Patient has appointment coming up in July with Dr. Guy, is questioning today how she will get to the office.    TC placed to [facility], spoke with Bibiana in the office.  Expressed that when patient was discharged, CM indicated that [facility] would be helping patient get established with visiting physician.  Bibiana said she will send in the paperwork for this today.  Explained once established with new PCP

## 2024-06-14 ENCOUNTER — CARE COORDINATION (OUTPATIENT)
Dept: CARE COORDINATION | Age: 74
End: 2024-06-14

## 2024-06-14 NOTE — CARE COORDINATION
Care Transitions Note  Follow Up Call     Patient Current Location:  Home: [address] [place] [address] [address]  *[facility]*  [facility] [address]    Care Transition Nurse contacted the patient by telephone. Verified name and  as identifiers.    Additional needs identified to be addressed with provider   No needs identified                 Method of communication with provider: none.    Care Summary Note: Spoke with patient for follow up transitional call. She said she is doing ok today, has some shoulder pain and has been using ice and TENS unit with some relief. She did get some Naproxen Sodium for her shoulder pain that she is taking BID and said this has helped take the edge off. She was prescribed stronger medication at discharge but daughter did not get, said she does not do well with narcotic medication.  Patient is primarily chair bound, spouse assists getting her to the commode, has jason lift in home but he has been just helping her stand and pivot. She did say today her  thought she was getting a little stronger.  Patient was concerned about returning home at discharge, she had been at [facility] for some time and had ran out of days.  She has a visiting physician that was set up by [facility] to come see her on Monday. Patient has not had any further chest pains since her discharge.     I let patient know I spoke with [facility] about the wheelchair she was measured for when she was there. I explained to patient that I was told this will need to be done again (measuring) as it has been a couple of months since it was last done.  The therapist I had spoken with said the delay with the chair was that her insurance keeps denying.  The therapist did say it was a more specialized chair that was suited for her with head rest and arm rest.  Patient denies any needs or concerns before heading into the weekend.  Expressed I would reach out next week after she meets with new provider.      Plan of care updates since last

## 2024-06-18 ENCOUNTER — CARE COORDINATION (OUTPATIENT)
Dept: CARE COORDINATION | Age: 74
End: 2024-06-18

## 2024-06-18 NOTE — CARE COORDINATION
Care Transitions Note  Follow Up Call     Patient Current Location:  Home: [address] [place] [address] [address]  *[facility]*  [facility] [address]    LPN Care Coordinator contacted the patient by telephone. Verified name and  as identifiers.    Additional needs identified to be addressed with provider   No needs identified                 Method of communication with provider: none.    Care Summary Note: Writer spoke with Criselda for a follow up care transitions call. She states she is doing okay so far this week. She states the visiting physician was out to see her Monday. She states they maikel labs and he gave her Tylenol for her shoulder which is helping and visine for her itchy/red eyes. She will be starting PT twice a week for 4 weeks. She denies having any chest pain or palpitations, no dizziness or SOB. She states her daughter comes over in the morning to help and her  is with her the rest of the day. She stated that she was able secure a ride to her upcoming neuro appt with Dr. Guy. She denies having any other needs or concerns at this time. She reports things are getting better the longer she has been home.     Plan of care updates since last contact:  Review of patient management of conditions/medications:         Advance Care Planning:   Does patient have an Advance Directive: reviewed and current.    Medication Review:  Visiting physician added Tyelenol and Visine PRN    Remote Patient Monitoring:  Offered patient enrollment in the Remote Patient Monitoring (RPM) program for in-home monitoring: Patient is not eligible for RPM program because: patient does not have qualifying diagnosis.    Assessments:  Care Transitions Subsequent and Final Call    Subsequent and Final Calls  Do you have any ongoing symptoms?: No  Have your medications changed?: No  Do you have any questions related to your medications?: No  Do you currently have any active services?: No  Are you currently active with any services?: Home

## 2024-06-26 ENCOUNTER — CARE COORDINATION (OUTPATIENT)
Dept: CARE COORDINATION | Age: 74
End: 2024-06-26

## 2024-06-26 NOTE — CARE COORDINATION
Care Transitions Note    Follow Up Call     Attempted to reach patient for transitions of care follow up.  Unable to reach patient.      Outreach Attempts:   HIPAA compliant voicemail left for patient.     Care Summary Note: 1st attempt    Follow Up Appointment:   Future Appointments         Provider Specialty Dept Phone    7/9/2024 2:30 PM Malena Guy,  Neurosurgery [phone]            Plan for follow-up on next business day.  based on severity of symptoms and risk factors. Plan for next call:  symptom management-any chest pain? How is shoulder pain? PT going okay?       Shaneka Romero LPN

## 2024-06-27 ENCOUNTER — CARE COORDINATION (OUTPATIENT)
Dept: CARE COORDINATION | Age: 74
End: 2024-06-27

## 2024-06-27 NOTE — CARE COORDINATION
Care Transitions Note    Follow Up Call     Patient Current Location:  Home: [address] [place] [address] [address]  *[facility]*  [facility] [address]    LPN Care Coordinator contacted the patient by telephone. Verified name and  as identifiers.    Additional needs identified to be addressed with provider   Pt reports swelling  Left lower leg around ankle she said fluid filled blisters no redness nurse was out yesterday told her to elevate and wear support hose. Nurse listened for pulses per pt stated nurse heard them she has had these blisters in the past no pain in back of calf's or cp . Writer advised that she elevate and wear compression hose. She has been taking lasix 20 mg daily . Pt also reports no swelling in Rt leg but small blisters  Pt reports no cp sob  reports chronic cough. Did called [facility] pt had nurse visit yesterday she noted swelling did educate pt to elevate wear compression stockings.   .a         Method of communication with provider: chart routing.    Care Summary Note: Writer spoke to Criselda today she reports that she is doing okay today. She stated she still has some shoulder pain it's not worsening it's still there. Had  home PT today. She stated that she has blisters on her lower legs no swelling in the right leg but swelling in the left leg. She stated that home care Nurse from [facility] told her to elevate her legs and wear compression hose. She also stated that nurse did listen for pulses and heard them. She stated that she has had these blisters in the past and will not scratch them reports no redness. She reports no pain to back of calf's of chest pain. Writer also advised elevating legs and wearing her compression stocking. She does also report a chronic cough.   Writer called [facility] spoke to Bibiana Mccarty reported that nurse Lavern did report bilateral swelling to legs advised her to elevate , wear compression hose and to do leg excerzises while sitting up in recliner. Pt has Nurse visit on

## 2024-06-28 ENCOUNTER — HOSPITAL ENCOUNTER (EMERGENCY)
Age: 74
Discharge: HOME | End: 2024-06-28
Attending: STUDENT IN AN ORGANIZED HEALTH CARE EDUCATION/TRAINING PROGRAM
Payer: MEDICARE

## 2024-06-28 ENCOUNTER — APPOINTMENT (OUTPATIENT)
Dept: GENERAL RADIOLOGY | Age: 74
End: 2024-06-28
Payer: MEDICARE

## 2024-06-28 ENCOUNTER — CARE COORDINATION (OUTPATIENT)
Dept: CARE COORDINATION | Age: 74
End: 2024-06-28

## 2024-06-28 VITALS
TEMPERATURE: 98 F | SYSTOLIC BLOOD PRESSURE: 151 MMHG | WEIGHT: 197 LBS | DIASTOLIC BLOOD PRESSURE: 62 MMHG | HEART RATE: 68 BPM | BODY MASS INDEX: 34.91 KG/M2 | OXYGEN SATURATION: 95 % | RESPIRATION RATE: 16 BRPM | HEIGHT: 63 IN
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
## 2024-06-28 LAB
ANION GAP SERPL CALCULATED.3IONS-SCNC: 12 MMOL/L (ref 9–17)
BASOPHILS # BLD: 0.1 K/UL (ref 0–0.2)
BASOPHILS NFR BLD: 1 % (ref 0–2)
BUN SERPL-MCNC: 24 MG/DL (ref 8–23)
CALCIUM SERPL-MCNC: 8.6 MG/DL (ref 8.6–10.4)
CHLORIDE SERPL-SCNC: 111 MMOL/L (ref 98–107)
CO2 SERPL-SCNC: 21 MMOL/L (ref 20–31)
CREAT SERPL-MCNC: 0.8 MG/DL (ref 0.5–0.9)
EKG ATRIAL RATE: 68 BPM
EKG P AXIS: 47 DEGREES
EKG P-R INTERVAL: 170 MS
EKG Q-T INTERVAL: 418 MS
EKG QRS DURATION: 72 MS
EKG QTC CALCULATION (BAZETT): 444 MS
EKG R AXIS: 16 DEGREES
EKG T AXIS: 22 DEGREES
EKG VENTRICULAR RATE: 68 BPM
EOSINOPHIL # BLD: 0.4 K/UL (ref 0–0.4)
EOSINOPHILS RELATIVE PERCENT: 7 % (ref 0–4)
ERYTHROCYTE [DISTWIDTH] IN BLOOD BY AUTOMATED COUNT: 14.2 % (ref 11.5–14.9)
GFR, ESTIMATED: 78 ML/MIN/1.73M2
GLUCOSE SERPL-MCNC: 145 MG/DL (ref 70–99)
HCT VFR BLD AUTO: 35.8 % (ref 36–46)
HGB BLD-MCNC: 11.6 G/DL (ref 12–16)
LYMPHOCYTES NFR BLD: 1.9 K/UL (ref 1–4.8)
LYMPHOCYTES RELATIVE PERCENT: 31 % (ref 24–44)
MCH RBC QN AUTO: 31.2 PG (ref 26–34)
MCHC RBC AUTO-ENTMCNC: 32.5 G/DL (ref 31–37)
MCV RBC AUTO: 96.2 FL (ref 80–100)
MONOCYTES NFR BLD: 0.5 K/UL (ref 0.1–1.3)
MONOCYTES NFR BLD: 8 % (ref 1–7)
NEUTROPHILS NFR BLD: 53 % (ref 36–66)
NEUTS SEG NFR BLD: 3.4 K/UL (ref 1.3–9.1)
PLATELET # BLD AUTO: 234 K/UL (ref 150–450)
PMV BLD AUTO: 7.5 FL (ref 6–12)
POTASSIUM SERPL-SCNC: 3.7 MMOL/L (ref 3.7–5.3)
RBC # BLD AUTO: 3.72 M/UL (ref 4–5.2)
SODIUM SERPL-SCNC: 144 MMOL/L (ref 135–144)
TROPONIN I SERPL HS-MCNC: 19 NG/L (ref 0–14)
WBC OTHER # BLD: 6.3 K/UL (ref 3.5–11)

## 2024-06-28 PROCEDURE — 36415 COLL VENOUS BLD VENIPUNCTURE: CPT

## 2024-06-28 PROCEDURE — 73030 X-RAY EXAM OF SHOULDER: CPT

## 2024-06-28 PROCEDURE — 6370000000 HC RX 637 (ALT 250 FOR IP): Performed by: STUDENT IN AN ORGANIZED HEALTH CARE EDUCATION/TRAINING PROGRAM

## 2024-06-28 PROCEDURE — 85025 COMPLETE CBC W/AUTO DIFF WBC: CPT

## 2024-06-28 PROCEDURE — 73630 X-RAY EXAM OF FOOT: CPT

## 2024-06-28 PROCEDURE — 84484 ASSAY OF TROPONIN QUANT: CPT

## 2024-06-28 PROCEDURE — 99285 EMERGENCY DEPT VISIT HI MDM: CPT

## 2024-06-28 PROCEDURE — 93005 ELECTROCARDIOGRAM TRACING: CPT | Performed by: STUDENT IN AN ORGANIZED HEALTH CARE EDUCATION/TRAINING PROGRAM

## 2024-06-28 PROCEDURE — 80048 BASIC METABOLIC PNL TOTAL CA: CPT

## 2024-06-28 RX ORDER — ACETAMINOPHEN 500 MG
1000 TABLET ORAL ONCE
Status: COMPLETED | OUTPATIENT
Start: 2024-06-28 | End: 2024-06-28

## 2024-06-28 RX ADMIN — ACETAMINOPHEN 1000 MG: 500 TABLET ORAL at 17:20

## 2024-06-28 ASSESSMENT — ENCOUNTER SYMPTOMS
RHINORRHEA: 0
ABDOMINAL PAIN: 0
SHORTNESS OF BREATH: 0
COUGH: 0
NAUSEA: 0
VOMITING: 0

## 2024-06-28 ASSESSMENT — PAIN - FUNCTIONAL ASSESSMENT: PAIN_FUNCTIONAL_ASSESSMENT: NONE - DENIES PAIN

## 2024-06-28 ASSESSMENT — PAIN SCALES - GENERAL
PAINLEVEL_OUTOF10: 7
PAINLEVEL_OUTOF10: 7
PAINLEVEL_OUTOF10: 4

## 2024-06-28 ASSESSMENT — PAIN DESCRIPTION - LOCATION
LOCATION: LEG

## 2024-06-28 ASSESSMENT — PAIN DESCRIPTION - ORIENTATION
ORIENTATION: RIGHT;LEFT
ORIENTATION: RIGHT;LEFT

## 2024-06-28 ASSESSMENT — PAIN DESCRIPTION - DESCRIPTORS: DESCRIPTORS: ACHING

## 2024-06-28 NOTE — ED PROVIDER NOTES
[facility]  Emergency Department Encounter  Emergency Medicine Physician     Pt Name: Criselda Tinoco  MRN: [id number]  Birthdate 1950  Date of evaluation: 6/28/24  PCP:  Wood St MD    CHIEF COMPLAINT       Chief Complaint   Patient presents with    Fall    Leg Pain    Leg Swelling       HISTORY OF PRESENT ILLNESS  (Location/Symptom, Timing/Onset, Context/Setting, Quality, Duration, Modifying Factors, Severity.)    Criselda Tinoco is a 74 y.o. female who presents with bilateral foot pain.  Patient states that she fell out of her wheelchair earlier this evening and had trouble getting up.  States that she gets around on a wheelchair.  Has difficulty walking.  Has had foot surgeries before on the left but not on the right.  States that she has had some bilateral lower extremity edema chronically although she feels it slightly worse than usual.  Denies any chest pain.  Denies any shortness of breath.  Denies abdominal pain.  States taking all medication as prescribed including her Eliquis.  Denies any knee pain.  States that she has some about the swelling in the bilateral legs.  States that she called her primary care provider's office however was on the phone and directed her to come to the emergency department.        PAST MEDICAL / SURGICAL / SOCIAL / FAMILY HISTORY    has a past medical history of Allergic rhinitis, cause unspecified, Back pain, Bowel obstruction (HCC), C. difficile diarrhea, CAD (coronary artery disease), Cardiac murmur, Cellulitis, Cerebral artery occlusion with cerebral infarction (HCC), COVID-19, Diverticulosis of colon (without mention of hemorrhage), GERD (gastroesophageal reflux disease), History of blood transfusion, History of CHF (congestive heart failure), History of MI (myocardial infarction), History of ovarian cyst, History of peritonitis, HTN (hypertension), Hx of blood clots, Hyperlipidemia, Intestinal or peritoneal adhesions with obstruction

## 2024-06-28 NOTE — ED TRIAGE NOTES
Mode of arrival (squad #, walk in, police, etc) : EMS        Chief complaint(s): Fall, leg pain, leg swelling        Arrival Note (brief scenario, treatment PTA, etc).: Pt. Fell today around 1530. Pt. Is complaining of bilateral leg pain 7/10. Pt. Denies loss of consciousness during fall. Pt. Lives at home w  and uses wheelchair to get around. Pt. Also has leg swelling unrelated to fall. Pt. States they take lasix at home. Pt. States legs more swollen then usual.        C= \"Have you ever felt that you should Cut down on your drinking?\"  No  A= \"Have people Annoyed you by criticizing your drinking?\"  No  G= \"Have you ever felt bad or Guilty about your drinking?\"  No  E= \"Have you ever had a drink as an Eye-opener first thing in the morning to steady your nerves or to help a hangover?\"  No      Deferred []      Reason for deferring: N/A    *If yes to two or more: probable alcohol abuse.*

## 2024-06-28 NOTE — DISCHARGE INSTRUCTIONS
1.  Please follow-up with her primary care provider soon as possible  .  Take Tylenol up to 1 g every 8 hours as needed for pain  You may continue to take your home baclofen as needed as prescribed for pain control as well  Also recommend icing your shoulder and knees as needed for up to 20 minutes at a time, 3-4 times a day  Return to the ER with any severe worsening of symptoms that you cannot control with

## 2024-06-28 NOTE — ED NOTES
Report given to MEENU Mohr from ER.   Report method in person   The following was reviewed with receiving RN:   Current vital signs:  BP (!) 142/62   Pulse 67   Temp 98 °F (36.7 °C) (Oral)   Resp 14   Ht 1.6 m (5' 3\")   Wt 89.4 kg (197 lb)   SpO2 96%   BMI 34.90 kg/m²                MEWS Score: 1     Any medication or safety alerts were reviewed. Any pending diagnostics and notifications were also reviewed, as well as any safety concerns or issues, abnormal labs, abnormal imaging, and abnormal assessment findings. Questions were answered.

## 2024-06-28 NOTE — CARE COORDINATION
Writer spoke to Criselda today writer advised pt that  advised that pt go to the ER/ UC to be evaluated for cellulitis DVT HF. Pt stated that she is doing okay today and will have her  take her.   Vianney Blanton LPN   Care Transitions Nurse  Cell

## 2024-06-29 NOTE — ED NOTES
Changed into new diaper, continued external catheter hooked to suction, provided Pretzels and ice water. Updated patient regarding the time of her transport to home.

## 2024-06-29 NOTE — ED NOTES
Discharge instructions reviewed with patient, noting all directions and education by provider by Addis CORRIGAN.Patient verbalizes understanding of all information reviewed.

## 2024-07-01 ENCOUNTER — CARE COORDINATION (OUTPATIENT)
Dept: CARE COORDINATION | Age: 74
End: 2024-07-01

## 2024-07-01 DIAGNOSIS — I82.401 DEEP VEIN THROMBOSIS (DVT) OF RIGHT LOWER EXTREMITY, UNSPECIFIED CHRONICITY, UNSPECIFIED VEIN (HCC): ICD-10-CM

## 2024-07-01 LAB
EKG ATRIAL RATE: 68 BPM
EKG P AXIS: 47 DEGREES
EKG P-R INTERVAL: 170 MS
EKG Q-T INTERVAL: 418 MS
EKG QRS DURATION: 72 MS
EKG QTC CALCULATION (BAZETT): 444 MS
EKG R AXIS: 16 DEGREES
EKG T AXIS: 22 DEGREES
EKG VENTRICULAR RATE: 68 BPM

## 2024-07-01 PROCEDURE — 93010 ELECTROCARDIOGRAM REPORT: CPT | Performed by: INTERNAL MEDICINE

## 2024-07-01 NOTE — TELEPHONE ENCOUNTER
Patients daughter called in request for all refills to be sent to [facility] , also requested to refill Hydroxyzine  10mg I tablet by mouth 3 times daily which I do not see on current or history med list, please advise      Last office visit 7/5/2023   Future scheduled appointment

## 2024-07-01 NOTE — CARE COORDINATION
Care Transitions Note    Follow Up Call     Patient Current Location:  Home: [address] [place] [address] [address]  *[facility]  [facility] [id number]    Care Transition Nurse contacted the patient by telephone. Verified name and  as identifiers.    Additional needs identified to be addressed with provider   No needs identified                 Method of communication with provider: none.    Care Summary Note: Spoke with patient today for ED follow up call. Patient came to the ED on advise from her PCP office d/t leg swelling and concern for possible cellulitis.  Patient states she has fluid filled blisters to LE, she had felt the swelling was worse than what she usually has.  She was brought in and evaluated, was complaining of bilateral foot pain.  Patient had testing done and discharged to home.  She has stockings on legs today, she has some pain to her feet but mostly c/o her shoulders hurting. She has been using Tylenol, a TENS unit for her shoulder and I expressed she should try to ice the area as well.  Her DC instructions expressed for her to ice about 4-5 times a day 20 min at a time.  She has home care but is unsure when nurse coming back out to see her.  She did say that she is a little more comfortable about being home.  She was at a SNF for an extended amount of time, around 8 months but returned home at discharge, was out of days.  Spouse is helping her at home, he will help her stand and pivot to commode or chair.  She states is more painful when her girlfriend helps as she grabs her by her arms to stand her and it bothers her shoulders.  I asked if she has a gait belt at home and she does, advised to have her use this to help her stand instead of using her arms.    Patient has no new medications, she is no longer following with Dr. St and was set up with visiting physicians.  She denies any needs or concerns at this time.     Plan of care updates since last contact:  Education: Discussed using ice to shoulders,

## 2024-07-02 RX ORDER — GABAPENTIN 100 MG/1
200 CAPSULE ORAL 2 TIMES DAILY
Qty: 90 CAPSULE | Refills: 3 | Status: SHIPPED | OUTPATIENT
Start: 2024-07-02 | End: 2024-09-30

## 2024-07-02 RX ORDER — ACETAMINOPHEN 325 MG/1
650 TABLET ORAL EVERY 4 HOURS PRN
Qty: 120 TABLET | Refills: 0 | Status: SHIPPED | OUTPATIENT
Start: 2024-07-02

## 2024-07-02 RX ORDER — AMLODIPINE BESYLATE 10 MG/1
10 TABLET ORAL DAILY
Qty: 90 TABLET | Refills: 3 | Status: SHIPPED | OUTPATIENT
Start: 2024-07-02

## 2024-07-02 RX ORDER — ATORVASTATIN CALCIUM 40 MG/1
40 TABLET, FILM COATED ORAL DAILY
Qty: 90 TABLET | Refills: 3 | Status: SHIPPED | OUTPATIENT
Start: 2024-07-02

## 2024-07-02 RX ORDER — SODIUM BICARBONATE 650 MG/1
650 TABLET ORAL 2 TIMES DAILY
Qty: 180 TABLET | Refills: 3 | Status: SHIPPED | OUTPATIENT
Start: 2024-07-02

## 2024-07-02 RX ORDER — FUROSEMIDE 20 MG/1
20 TABLET ORAL DAILY
Qty: 180 TABLET | Refills: 3 | Status: SHIPPED | OUTPATIENT
Start: 2024-07-02

## 2024-07-02 RX ORDER — HYDRALAZINE HYDROCHLORIDE 25 MG/1
25 TABLET, FILM COATED ORAL 3 TIMES DAILY
Qty: 90 TABLET | Refills: 2 | Status: SHIPPED | OUTPATIENT
Start: 2024-07-02

## 2024-07-02 RX ORDER — POTASSIUM CHLORIDE 750 MG/1
20 TABLET, FILM COATED, EXTENDED RELEASE ORAL DAILY
Qty: 180 TABLET | Refills: 3 | Status: SHIPPED | OUTPATIENT
Start: 2024-07-02

## 2024-07-02 RX ORDER — BACLOFEN 10 MG/1
10 TABLET ORAL 2 TIMES DAILY
Qty: 90 TABLET | Refills: 3 | Status: SHIPPED | OUTPATIENT
Start: 2024-07-02

## 2024-07-02 RX ORDER — CARVEDILOL 25 MG/1
25 TABLET ORAL 2 TIMES DAILY WITH MEALS
Qty: 180 TABLET | Refills: 3 | Status: SHIPPED | OUTPATIENT
Start: 2024-07-02

## 2024-07-05 ENCOUNTER — CARE COORDINATION (OUTPATIENT)
Dept: CARE COORDINATION | Age: 74
End: 2024-07-05

## 2024-07-05 NOTE — CARE COORDINATION
Care Transitions Note    Follow Up Call     Patient Current Location:  Home: [address] [place] [address] [address]  [facility]  [facility] [address]    Care Transition Nurse contacted the patient by telephone. Verified name and  as identifiers.    Additional needs identified to be addressed with provider   No needs identified                 Method of communication with provider: none.    Care Summary Note: patient reached for follow up call. Reviewed last contact, states no swelling or pain. Has been wrapping and elevating legs. Using ice and Tylenol for pain. Denies current pain. Reviewed medical appts, patient with transportation barriers due to WC bound. Advised may have benefits through insurance and encouraged to call. Chart reviewed, patient connected with home visiting PCP, Taiwo Cooney CNP. Reviewed may contact this provider if has pain, swelling for further advise, in lieu of ER visit. [facility] continues.       Plan of care updates since last contact:  Review of patient management of conditions/medications: denies current pain or swelling       Advance Care Planning:   Does patient have an Advance Directive: reviewed during previous call, see note. .    Medication Review:  Full medication reconciliation completed during previous call.    Remote Patient Monitoring:  Offered patient enrollment in the Remote Patient Monitoring (RPM) program for in-home monitoring: Yes, but did not enroll at this time: not discussed this call .    Assessments:  Care Transitions Subsequent and Final Call    Schedule Follow Up Appointment with PCP: Completed  Subsequent and Final Calls  Do you have any ongoing symptoms?: No  Have your medications changed?: No  Do you have any questions related to your medications?: No  Do you currently have any active services?: Yes  Are you currently active with any services?: Home Health  Do you have any needs or concerns that I can assist you with?: No  Identified Barriers: Impairment  Care

## 2024-07-11 ENCOUNTER — CARE COORDINATION (OUTPATIENT)
Dept: CARE COORDINATION | Age: 74
End: 2024-07-11

## 2024-07-11 ENCOUNTER — TELEPHONE (OUTPATIENT)
Dept: INTERNAL MEDICINE CLINIC | Age: 74
End: 2024-07-11

## 2024-07-11 NOTE — CARE COORDINATION
Care Transitions Note    Follow Up Call     Patient Current Location:  Home: [address] [place] [address] [address]  [facility]*  [facility] [address]    LPN Care Coordinator contacted the patient by telephone. Verified name and  as identifiers.    Patient graduated from the Care Transitions program on 24.  Patient/family verbalizes confidence in the ability to self-manage at this time..      Advance Care Planning:   Does patient have an Advance Directive:  reviewed previous call  .    Handoff:   Patient was not referred to the ACM team due to patient declined services.      Care Summary Note: Writer spoke to Criselda today for transitional care call. She reports that she's doing okay. She voiced no new concerns. She stated that PT was out today. She has a home care nurse coming out this evening. She stated that they said she is doing good. She stated she missed her neurology appointment. She said she called the insurance company and she was told to call 2-3 days before appointment, she rescheduled appointment for 24 . Pt aware to contact visiting pcp Luis Cooney CNP if she concerns instead of goind to ER she verbalized understanding. Writer offered ACM referral she declined referral. She is okay with ending transitional care calls since she also has home care following. She has writer's contact information if she wishes to reach out.   Assessments:  Missed neuro appointment discussed contacting visiting cnp if she has issues HC following refused acm referral    Upcoming Appointments:    Future Appointments         Provider Specialty Dept Phone    2024 2:00 PM Malena Guy DO Neurosurgery [phone]            Patient has agreed to contact primary care provider and/or specialist for any further questions, concerns, or needs.    Vianney Flores LPN

## 2024-07-24 NOTE — PROCEDURES
Care Transitions Note    Initial Call - Call within 2 business days of discharge: Yes    Patient Current Location:  Home: [address] [place] [address]  [address]  [facility] [address]    Care Transition Nurse contacted the patient by telephone to perform post hospital discharge assessment, verified name and  as identifiers. Provided introduction to self, and explanation of the Care Transition Nurse role.     Patient: Maren Meza    Patient : 1955   MRN: [id number]    Reason for Admission: Chest Pain - hypertensive urgency  Discharge Date: 24  RURS: Readmission Risk Score: 30.7      Last Discharge Facility       Date Complaint Diagnosis Description Type Department Provider    24 Chest Pain Acute chest pain ... ED to Hosp-Admission (Discharged) (ADMITTED) [facility] Eneida Burger MD; ALBERTO Boyer.            Was this an external facility discharge? No    Additional needs identified to be addressed with provider   No needs identified             Method of communication with provider: none.    Patients top risk factors for readmission: functional physical ability    Interventions to address risk factors:   Discussed her HF    Care Summary Note: Maren states she is doing \"okay.\" She states she has not heard from [facility]. Call placed to [facility]. Left message. Contact info provided. Requested return call to [facility].Attempting to confirm receipt of resumption of care orders for patient. Maren states her B/P today = 158/70. She states her BG level = 170 this morning. Maren states her weight today = 113.0 lbs. She states she is SOB with increased activity. Maren denies any chest pain or edema at this time. She states she follows a cardiac/diabetic/kidney diet. She states she does not use salt. Maren states she does monitor/restrict her fluids daily. She states she continues to have nausea - denies any emesis. Maren states she has a hand held nebulizer at home. She states she uses this prn.  INSTRUMENTAL SWALLOW REPORT  MODIFIED BARIUM SWALLOW    NAME: Lay Grade   : 1950  MRN: [id number]       Date of Eval: 2021              Referring Diagnosis(es):  dysphagia    Past Medical History:  has a past medical history of Allergic rhinitis, cause unspecified, Back pain, Bowel obstruction (HCC), C. difficile diarrhea, CAD (coronary artery disease), Cellulitis, Cellulitis, Cerebral artery occlusion with cerebral infarction [facility]), Diverticulosis of colon (without mention of hemorrhage), GERD (gastroesophageal reflux disease), History of MI (myocardial infarction), History of ovarian cyst, History of peritonitis, HTN (hypertension), Hx of blood clots, Hyperlipidemia, Intestinal or peritoneal adhesions with obstruction (postoperative) (postinfection) ([address] Kidney infection, Lateral epicondylitis  of elbow, MDRO (multiple drug resistant organisms) resistance, Muscle strain, Other abnormal glucose, PONV (postoperative nausea and vomiting), Pre-diabetes, Restless legs syndrome (RLS), TIA (transient ischemic attack), Vitamin D deficiency, and Wears glasses. Past Surgical History:  has a past surgical history that includes Ovary removal (); colectomy (); Appendectomy (); Ovary removal (); Hysterectomy (); Bunionectomy (Left); sinus surgery (); Colonoscopy; other surgical history (14); cyst removal (Right); Wrist fracture surgery (Left, 3/5/2019); Upper gastrointestinal endoscopy (N/A, 2019); Upper gastrointestinal endoscopy (N/A, 2019); Upper gastrointestinal endoscopy (N/A, 2019); Abdomen surgery (); lumbar fusion (N/A, 2/10/2020); Cardiac catheterization; Cardiac catheterization (); [place] tooth extraction; and lumbar fusion (N/A, 2020). Current Diet Solid Consistency: Regular  Current Diet Liquid Consistency:  Thin       Type of Study: Initial MBS       Recent CXR/CT of Chest: Date n/a    Patient Complaints/Reason for Referral:  Chery Tapia Anival Vieira was referred for a MBS to assess the efficiency of his/her swallow function, assess for aspiration, and to make recommendations regarding safe dietary consistencies, effective compensatory strategies, and safe eating environment. Onset of problem:    pt. Reports some difficulty swallowing. Pt. Reports she is to have spinal surgery 05/21. MRI completed on 03/05 revealed:   Multilevel degenerative disc disease with associated uncovertebral and facet   hypertrophy with canal stenosis most pronounced at C3-4 and C5-6. Pt. Demonstrating constant cough throughout assessment. Behavior/Cognition/Vision/Hearing:  Behavior/Cognition: Alert; Cooperative  Vision: Within Functional Limits  Hearing: Within functional limits    Impressions:     Patient Position: Lateral     Consistencies Administered: Dysphagia Soft and Bite-Sized (Dysphagia III); Reg solid;Pudding teaspoon;Nectar cup; Thin cup; Thin straw              Oral Phase: premature vallecular spillage              Dysphagia Outcome Severity Scale: Level 7: Normal in all situations       Recommended Diet:  Solid consistency: Regular  Liquid consistency: Thin            Safe Swallow Protocol:     Compensatory Swallowing Strategies: Upright as possible for all oral intake;Eat/Feed slowly; Small bites/sips              Recommendations/Treatment  Requires SLP Intervention: No                  Education: results and  recommendations were reviewed with pt. following this exam.      Patient Education Response: Verbalizes understanding                              Oral Preparation / Oral Phase  Oral Phase: WNL        Pharyngeal Phase  Pharyngeal Phase: WNL   Pt. Demonstrating cough before and after trials were given. Cough did not seem related to penetration/aspiration. Esophageal Phase  Esophageal Screen:  WNL             Therapy Time:   Individual Concurrent Group Co-treatment   Time In 1410         Time Out 1425         Minutes 15

## 2024-07-26 ENCOUNTER — TELEPHONE (OUTPATIENT)
Dept: INTERNAL MEDICINE CLINIC | Age: 74
End: 2024-07-26
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
## 2024-07-26 DIAGNOSIS — M79.672 LEFT FOOT PAIN: ICD-10-CM

## 2024-07-26 DIAGNOSIS — M54.2 NECK PAIN: ICD-10-CM

## 2024-07-26 DIAGNOSIS — M79.602 LEFT ARM PAIN: ICD-10-CM

## 2024-08-01 ENCOUNTER — OFFICE VISIT (OUTPATIENT)
Dept: INTERNAL MEDICINE CLINIC | Age: 74
End: 2024-08-01
Payer: MEDICARE

## 2024-08-01 VITALS
WEIGHT: 197 LBS | HEART RATE: 66 BPM | DIASTOLIC BLOOD PRESSURE: 60 MMHG | OXYGEN SATURATION: 96 % | SYSTOLIC BLOOD PRESSURE: 134 MMHG | HEIGHT: 63 IN | BODY MASS INDEX: 34.91 KG/M2

## 2024-08-01 DIAGNOSIS — F32.A DEPRESSION, UNSPECIFIED DEPRESSION TYPE: ICD-10-CM

## 2024-08-01 DIAGNOSIS — I10 ESSENTIAL HYPERTENSION: ICD-10-CM

## 2024-08-01 DIAGNOSIS — I63.429 CEREBROVASCULAR ACCIDENT (CVA) DUE TO EMBOLISM OF ANTERIOR CEREBRAL ARTERY, UNSPECIFIED BLOOD VESSEL LATERALITY (HCC): ICD-10-CM

## 2024-08-01 DIAGNOSIS — I50.32 CHRONIC HEART FAILURE WITH PRESERVED EJECTION FRACTION (HCC): ICD-10-CM

## 2024-08-01 DIAGNOSIS — Z91.81 AT HIGH RISK FOR FALLS: ICD-10-CM

## 2024-08-01 DIAGNOSIS — Z12.31 BREAST CANCER SCREENING BY MAMMOGRAM: ICD-10-CM

## 2024-08-01 DIAGNOSIS — I82.401 DEEP VEIN THROMBOSIS (DVT) OF RIGHT LOWER EXTREMITY, UNSPECIFIED CHRONICITY, UNSPECIFIED VEIN (HCC): Primary | ICD-10-CM

## 2024-08-01 DIAGNOSIS — E11.59 TYPE 2 DIABETES MELLITUS WITH OTHER CIRCULATORY COMPLICATION, WITHOUT LONG-TERM CURRENT USE OF INSULIN (HCC): ICD-10-CM

## 2024-08-01 DIAGNOSIS — J44.9 CHRONIC OBSTRUCTIVE PULMONARY DISEASE, UNSPECIFIED COPD TYPE (HCC): ICD-10-CM

## 2024-08-01 PROBLEM — I50.33 ACUTE ON CHRONIC DIASTOLIC (CONGESTIVE) HEART FAILURE (HCC): Status: RESOLVED | Noted: 2023-04-11 | Resolved: 2024-08-01

## 2024-08-01 PROBLEM — F33.1 MAJOR DEPRESSIVE DISORDER, RECURRENT, MODERATE (HCC): Status: RESOLVED | Noted: 2020-07-30 | Resolved: 2024-08-01

## 2024-08-01 PROBLEM — N18.30 STAGE 3 CHRONIC KIDNEY DISEASE (HCC): Status: RESOLVED | Noted: 2019-03-18 | Resolved: 2024-08-01

## 2024-08-01 PROCEDURE — 2022F DILAT RTA XM EVC RTNOPTHY: CPT | Performed by: INTERNAL MEDICINE

## 2024-08-01 PROCEDURE — G8427 DOCREV CUR MEDS BY ELIG CLIN: HCPCS | Performed by: INTERNAL MEDICINE

## 2024-08-01 PROCEDURE — G8417 CALC BMI ABV UP PARAM F/U: HCPCS | Performed by: INTERNAL MEDICINE

## 2024-08-01 PROCEDURE — 3075F SYST BP GE 130 - 139MM HG: CPT | Performed by: INTERNAL MEDICINE

## 2024-08-01 PROCEDURE — 3044F HG A1C LEVEL LT 7.0%: CPT | Performed by: INTERNAL MEDICINE

## 2024-08-01 PROCEDURE — G8399 PT W/DXA RESULTS DOCUMENT: HCPCS | Performed by: INTERNAL MEDICINE

## 2024-08-01 PROCEDURE — 1123F ACP DISCUSS/DSCN MKR DOCD: CPT | Performed by: INTERNAL MEDICINE

## 2024-08-01 PROCEDURE — 3078F DIAST BP <80 MM HG: CPT | Performed by: INTERNAL MEDICINE

## 2024-08-01 PROCEDURE — 1090F PRES/ABSN URINE INCON ASSESS: CPT | Performed by: INTERNAL MEDICINE

## 2024-08-01 PROCEDURE — 99214 OFFICE O/P EST MOD 30 MIN: CPT | Performed by: INTERNAL MEDICINE

## 2024-08-01 PROCEDURE — 1036F TOBACCO NON-USER: CPT | Performed by: INTERNAL MEDICINE

## 2024-08-01 PROCEDURE — 3017F COLORECTAL CA SCREEN DOC REV: CPT | Performed by: INTERNAL MEDICINE

## 2024-08-01 PROCEDURE — 3023F SPIROM DOC REV: CPT | Performed by: INTERNAL MEDICINE

## 2024-08-01 RX ORDER — SERTRALINE HYDROCHLORIDE 25 MG/1
25 TABLET, FILM COATED ORAL 3 TIMES DAILY
Qty: 30 TABLET | Refills: 3 | Status: SHIPPED | OUTPATIENT
Start: 2024-08-01

## 2024-08-01 RX ORDER — FUROSEMIDE 40 MG/1
40 TABLET ORAL DAILY
Qty: 90 TABLET | Refills: 0 | Status: SHIPPED | OUTPATIENT
Start: 2024-08-01

## 2024-08-01 SDOH — ECONOMIC STABILITY: HOUSING INSECURITY
IN THE LAST 12 MONTHS, WAS THERE A TIME WHEN YOU DID NOT HAVE A STEADY PLACE TO SLEEP OR SLEPT IN A SHELTER (INCLUDING NOW)?: PATIENT DECLINED

## 2024-08-01 SDOH — ECONOMIC STABILITY: FOOD INSECURITY: WITHIN THE PAST 12 MONTHS, YOU WORRIED THAT YOUR FOOD WOULD RUN OUT BEFORE YOU GOT MONEY TO BUY MORE.: PATIENT DECLINED

## 2024-08-01 SDOH — ECONOMIC STABILITY: FOOD INSECURITY: WITHIN THE PAST 12 MONTHS, THE FOOD YOU BOUGHT JUST DIDN'T LAST AND YOU DIDN'T HAVE MONEY TO GET MORE.: PATIENT DECLINED

## 2024-08-01 SDOH — ECONOMIC STABILITY: INCOME INSECURITY: HOW HARD IS IT FOR YOU TO PAY FOR THE VERY BASICS LIKE FOOD, HOUSING, MEDICAL CARE, AND HEATING?: PATIENT DECLINED

## 2024-08-01 ASSESSMENT — ENCOUNTER SYMPTOMS
COUGH: 1
APNEA: 0
ABDOMINAL PAIN: 0
CHOKING: 0
EYE ITCHING: 0
CHEST TIGHTNESS: 0
EYE DISCHARGE: 0
DIARRHEA: 0
CONSTIPATION: 0
BACK PAIN: 0
SHORTNESS OF BREATH: 1
EYE REDNESS: 0
BLOOD IN STOOL: 0
EYE PAIN: 0
ABDOMINAL DISTENTION: 0
COLOR CHANGE: 0

## 2024-08-01 ASSESSMENT — PATIENT HEALTH QUESTIONNAIRE - PHQ9
6. FEELING BAD ABOUT YOURSELF - OR THAT YOU ARE A FAILURE OR HAVE LET YOURSELF OR YOUR FAMILY DOWN: NOT AT ALL
9. THOUGHTS THAT YOU WOULD BE BETTER OFF DEAD, OR OF HURTING YOURSELF: NOT AT ALL
SUM OF ALL RESPONSES TO PHQ QUESTIONS 1-9: 7
SUM OF ALL RESPONSES TO PHQ QUESTIONS 1-9: 7
1. LITTLE INTEREST OR PLEASURE IN DOING THINGS: NEARLY EVERY DAY
7. TROUBLE CONCENTRATING ON THINGS, SUCH AS READING THE NEWSPAPER OR WATCHING TELEVISION: NOT AT ALL
8. MOVING OR SPEAKING SO SLOWLY THAT OTHER PEOPLE COULD HAVE NOTICED. OR THE OPPOSITE, BEING SO FIGETY OR RESTLESS THAT YOU HAVE BEEN MOVING AROUND A LOT MORE THAN USUAL: NOT AT ALL
4. FEELING TIRED OR HAVING LITTLE ENERGY: SEVERAL DAYS
SUM OF ALL RESPONSES TO PHQ QUESTIONS 1-9: 7
3. TROUBLE FALLING OR STAYING ASLEEP: NOT AT ALL
SUM OF ALL RESPONSES TO PHQ QUESTIONS 1-9: 7
2. FEELING DOWN, DEPRESSED OR HOPELESS: NEARLY EVERY DAY
SUM OF ALL RESPONSES TO PHQ9 QUESTIONS 1 & 2: 6
10. IF YOU CHECKED OFF ANY PROBLEMS, HOW DIFFICULT HAVE THESE PROBLEMS MADE IT FOR YOU TO DO YOUR WORK, TAKE CARE OF THINGS AT HOME, OR GET ALONG WITH OTHER PEOPLE: VERY DIFFICULT
5. POOR APPETITE OR OVEREATING: NOT AT ALL

## 2024-08-01 NOTE — PROGRESS NOTES
Subjective     Patient ID: Criselda Tinoco is a 74 y.o. female.    HPI   patient past medical history multiple medical problem which include obesity, history of stroke with Left sided weekness , heart failure with preserved ejection fraction, chronic DVT on anticoagulation, hypertension  USES wheel chair   CR is ok , last Bicarb is ok   Does not wants to take so many pills ,  Has worsened swelling in legs , was supposed to be on 3 tab , taking only one , since she has to use bathroom multiple times     Review of Systems   Constitutional:  Positive for unexpected weight change (weight gain). Negative for activity change, appetite change, chills, diaphoresis, fatigue and fever.        Wheel chair Bound    HENT:  Negative for congestion, dental problem, drooling and ear discharge.    Eyes:  Negative for pain, discharge, redness and itching.   Respiratory:  Positive for cough and shortness of breath. Negative for apnea, choking and chest tightness.    Cardiovascular:  Positive for leg swelling (both legs). Negative for chest pain.   Gastrointestinal:  Negative for abdominal distention, abdominal pain, blood in stool, constipation and diarrhea.   Endocrine: Negative for cold intolerance and heat intolerance.   Genitourinary:  Negative for difficulty urinating, dysuria, enuresis, flank pain and frequency.   Musculoskeletal:  Positive for arthralgias (left arm), neck pain and neck stiffness. Negative for back pain, gait problem and joint swelling.   Skin:  Negative for color change, pallor and rash.   Neurological:  Positive for weakness (left side of body). Negative for dizziness, facial asymmetry, light-headedness, numbness and headaches.   Psychiatric/Behavioral:  Negative for agitation, behavioral problems, confusion, decreased concentration and dysphoric mood.           Objective   Physical Exam  Constitutional:       Appearance: She is well-developed. She is not diaphoretic.      Comments: Wheel chair Bound

## 2024-09-12 ENCOUNTER — TELEPHONE (OUTPATIENT)
Dept: INTERNAL MEDICINE CLINIC | Age: 74
End: 2024-09-12

## 2024-09-12 DIAGNOSIS — F32.A DEPRESSION, UNSPECIFIED DEPRESSION TYPE: ICD-10-CM

## 2024-09-12 RX ORDER — SERTRALINE HYDROCHLORIDE 25 MG/1
25 TABLET, FILM COATED ORAL 3 TIMES DAILY
Qty: 270 TABLET | Refills: 3 | Status: SHIPPED | OUTPATIENT
Start: 2024-09-12

## 2024-10-10 DIAGNOSIS — F33.1 MAJOR DEPRESSIVE DISORDER, RECURRENT, MODERATE (HCC): ICD-10-CM

## 2024-10-10 RX ORDER — BUSPIRONE HYDROCHLORIDE 5 MG/1
5 TABLET ORAL 3 TIMES DAILY
Qty: 90 TABLET | Refills: 3 | Status: SHIPPED | OUTPATIENT
Start: 2024-10-10

## 2024-10-10 RX ORDER — ACETAMINOPHEN 325 MG/1
650 TABLET ORAL EVERY 4 HOURS PRN
Qty: 120 TABLET | Refills: 0 | Status: SHIPPED | OUTPATIENT
Start: 2024-10-10

## 2024-10-21 ENCOUNTER — APPOINTMENT (OUTPATIENT)
Dept: GENERAL RADIOLOGY | Age: 74
End: 2024-10-21
Payer: MEDICARE

## 2024-10-21 ENCOUNTER — APPOINTMENT (OUTPATIENT)
Dept: CT IMAGING | Age: 74
End: 2024-10-21
Payer: MEDICARE

## 2024-10-21 ENCOUNTER — HOSPITAL ENCOUNTER (OUTPATIENT)
Age: 74
Setting detail: OBSERVATION
Discharge: NURSING, REHAB OR LONG-TERM CARE | End: 2024-10-25
Attending: STUDENT IN AN ORGANIZED HEALTH CARE EDUCATION/TRAINING PROGRAM | Admitting: INTERNAL MEDICINE
Payer: MEDICARE

## 2024-10-21 DIAGNOSIS — R47.81 SLURRED SPEECH: Primary | ICD-10-CM

## 2024-10-21 LAB
ALBUMIN SERPL-MCNC: 4 G/DL (ref 3.5–5.2)
ALP SERPL-CCNC: 65 U/L (ref 35–104)
ALT SERPL-CCNC: 8 U/L (ref 10–35)
ANION GAP SERPL CALCULATED.3IONS-SCNC: 13 MMOL/L (ref 9–16)
AST SERPL-CCNC: 16 U/L (ref 10–35)
BASOPHILS # BLD: 0.1 K/UL (ref 0–0.2)
BASOPHILS NFR BLD: 1 % (ref 0–2)
BILIRUB SERPL-MCNC: 0.4 MG/DL (ref 0–1.2)
BUN SERPL-MCNC: 19 MG/DL (ref 8–23)
CALCIUM SERPL-MCNC: 9 MG/DL (ref 8.6–10.4)
CHLORIDE SERPL-SCNC: 107 MMOL/L (ref 98–107)
CO2 SERPL-SCNC: 22 MMOL/L (ref 20–31)
CREAT SERPL-MCNC: 1 MG/DL (ref 0.7–1.2)
EOSINOPHIL # BLD: 0.4 K/UL (ref 0–0.4)
EOSINOPHILS RELATIVE PERCENT: 6 % (ref 0–4)
ERYTHROCYTE [DISTWIDTH] IN BLOOD BY AUTOMATED COUNT: 14.3 % (ref 11.5–14.9)
GFR, ESTIMATED: 59 ML/MIN/1.73M2
GLUCOSE SERPL-MCNC: 112 MG/DL (ref 74–99)
HCT VFR BLD AUTO: 37.7 % (ref 36–46)
HGB BLD-MCNC: 12.6 G/DL (ref 12–16)
INR PPP: 1.5
LYMPHOCYTES NFR BLD: 2.1 K/UL (ref 1–4.8)
LYMPHOCYTES RELATIVE PERCENT: 28 % (ref 24–44)
MCH RBC QN AUTO: 32.2 PG (ref 26–34)
MCHC RBC AUTO-ENTMCNC: 33.3 G/DL (ref 31–37)
MCV RBC AUTO: 96.7 FL (ref 80–100)
MONOCYTES NFR BLD: 0.7 K/UL (ref 0.1–1.3)
MONOCYTES NFR BLD: 9 % (ref 1–7)
NEUTROPHILS NFR BLD: 56 % (ref 36–66)
NEUTS SEG NFR BLD: 4.2 K/UL (ref 1.3–9.1)
PLATELET # BLD AUTO: 225 K/UL (ref 150–450)
PMV BLD AUTO: 7.7 FL (ref 6–12)
POTASSIUM SERPL-SCNC: 4.1 MMOL/L (ref 3.7–5.3)
PROT SERPL-MCNC: 7.2 G/DL (ref 6.6–8.7)
PROTHROMBIN TIME: 18.5 SEC (ref 11.8–14.6)
RBC # BLD AUTO: 3.9 M/UL (ref 4–5.2)
SODIUM SERPL-SCNC: 142 MMOL/L (ref 136–145)
TROPONIN I SERPL HS-MCNC: 20 NG/L (ref 0–14)
TSH SERPL DL<=0.05 MIU/L-ACNC: 3.24 UIU/ML (ref 0.27–4.2)
WBC OTHER # BLD: 7.5 K/UL (ref 3.5–11)

## 2024-10-21 PROCEDURE — 85025 COMPLETE CBC W/AUTO DIFF WBC: CPT

## 2024-10-21 PROCEDURE — 84443 ASSAY THYROID STIM HORMONE: CPT

## 2024-10-21 PROCEDURE — 99285 EMERGENCY DEPT VISIT HI MDM: CPT

## 2024-10-21 PROCEDURE — 36415 COLL VENOUS BLD VENIPUNCTURE: CPT

## 2024-10-21 PROCEDURE — 70496 CT ANGIOGRAPHY HEAD: CPT

## 2024-10-21 PROCEDURE — 80053 COMPREHEN METABOLIC PANEL: CPT

## 2024-10-21 PROCEDURE — 71045 X-RAY EXAM CHEST 1 VIEW: CPT

## 2024-10-21 PROCEDURE — 70498 CT ANGIOGRAPHY NECK: CPT

## 2024-10-21 PROCEDURE — 6360000004 HC RX CONTRAST MEDICATION: Performed by: STUDENT IN AN ORGANIZED HEALTH CARE EDUCATION/TRAINING PROGRAM

## 2024-10-21 PROCEDURE — 70450 CT HEAD/BRAIN W/O DYE: CPT

## 2024-10-21 PROCEDURE — 84484 ASSAY OF TROPONIN QUANT: CPT

## 2024-10-21 PROCEDURE — 72125 CT NECK SPINE W/O DYE: CPT

## 2024-10-21 PROCEDURE — 85610 PROTHROMBIN TIME: CPT

## 2024-10-21 PROCEDURE — 2580000003 HC RX 258: Performed by: STUDENT IN AN ORGANIZED HEALTH CARE EDUCATION/TRAINING PROGRAM

## 2024-10-21 RX ORDER — 0.9 % SODIUM CHLORIDE 0.9 %
90 INTRAVENOUS SOLUTION INTRAVENOUS ONCE
Status: COMPLETED | OUTPATIENT
Start: 2024-10-21 | End: 2024-10-21

## 2024-10-21 RX ORDER — SODIUM CHLORIDE 0.9 % (FLUSH) 0.9 %
10 SYRINGE (ML) INJECTION PRN
Status: COMPLETED | OUTPATIENT
Start: 2024-10-21 | End: 2024-10-21

## 2024-10-21 RX ORDER — IOPAMIDOL 755 MG/ML
75 INJECTION, SOLUTION INTRAVASCULAR
Status: COMPLETED | OUTPATIENT
Start: 2024-10-21 | End: 2024-10-21

## 2024-10-21 RX ADMIN — SODIUM CHLORIDE 90 ML: 9 INJECTION, SOLUTION INTRAVENOUS at 23:05

## 2024-10-21 RX ADMIN — SODIUM CHLORIDE, PRESERVATIVE FREE 10 ML: 5 INJECTION INTRAVENOUS at 23:05

## 2024-10-21 RX ADMIN — IOPAMIDOL 75 ML: 755 INJECTION, SOLUTION INTRAVENOUS at 23:03

## 2024-10-21 ASSESSMENT — PAIN DESCRIPTION - LOCATION: LOCATION: NECK

## 2024-10-21 ASSESSMENT — PAIN - FUNCTIONAL ASSESSMENT: PAIN_FUNCTIONAL_ASSESSMENT: 0-10

## 2024-10-22 ENCOUNTER — APPOINTMENT (OUTPATIENT)
Dept: NEUROLOGY | Age: 74
End: 2024-10-22
Payer: MEDICARE

## 2024-10-22 ENCOUNTER — APPOINTMENT (OUTPATIENT)
Dept: MRI IMAGING | Age: 74
End: 2024-10-22
Payer: MEDICARE

## 2024-10-22 ENCOUNTER — APPOINTMENT (OUTPATIENT)
Age: 74
End: 2024-10-22
Payer: MEDICARE

## 2024-10-22 PROBLEM — R47.81 SLURRED SPEECH: Status: ACTIVE | Noted: 2024-10-22

## 2024-10-22 LAB
ANION GAP SERPL CALCULATED.3IONS-SCNC: 25 MMOL/L (ref 9–16)
BASOPHILS # BLD: 0.1 K/UL (ref 0–0.2)
BASOPHILS NFR BLD: 1 % (ref 0–2)
BILIRUB UR QL STRIP: NEGATIVE
BUN SERPL-MCNC: 14 MG/DL (ref 8–23)
CALCIUM SERPL-MCNC: 8.2 MG/DL (ref 8.6–10.4)
CHLORIDE SERPL-SCNC: 107 MMOL/L (ref 98–107)
CLARITY UR: CLEAR
CO2 SERPL-SCNC: 10 MMOL/L (ref 20–31)
COLOR UR: YELLOW
COMMENT: ABNORMAL
CREAT SERPL-MCNC: 0.7 MG/DL (ref 0.7–1.2)
ECHO AO ROOT DIAM: 2.9 CM
ECHO AO ROOT INDEX: 1.5 CM/M2
ECHO AV AREA PEAK VELOCITY: 1.4 CM2
ECHO AV AREA VTI: 1.6 CM2
ECHO AV AREA/BSA PEAK VELOCITY: 0.7 CM2/M2
ECHO AV AREA/BSA VTI: 0.8 CM2/M2
ECHO AV MEAN GRADIENT: 8 MMHG
ECHO AV MEAN VELOCITY: 1.3 M/S
ECHO AV PEAK GRADIENT: 15 MMHG
ECHO AV PEAK VELOCITY: 1.9 M/S
ECHO AV VELOCITY RATIO: 0.58
ECHO AV VTI: 38.3 CM
ECHO BSA: 2.01 M2
ECHO LA AREA 2C: 16 CM2
ECHO LA AREA 4C: 15.1 CM2
ECHO LA DIAMETER INDEX: 1.61 CM/M2
ECHO LA DIAMETER: 3.1 CM
ECHO LA MAJOR AXIS: 5.3 CM
ECHO LA MINOR AXIS: 5.7 CM
ECHO LA TO AORTIC ROOT RATIO: 1.07
ECHO LA VOL BP: 36 ML (ref 22–52)
ECHO LA VOL MOD A2C: 36 ML (ref 22–52)
ECHO LA VOL MOD A4C: 33 ML (ref 22–52)
ECHO LA VOL/BSA BIPLANE: 19 ML/M2 (ref 16–34)
ECHO LA VOLUME INDEX MOD A2C: 19 ML/M2 (ref 16–34)
ECHO LA VOLUME INDEX MOD A4C: 17 ML/M2 (ref 16–34)
ECHO LV E' LATERAL VELOCITY: 7.8 CM/S
ECHO LV E' SEPTAL VELOCITY: 7.3 CM/S
ECHO LV EF PHYSICIAN: 60 %
ECHO LV FRACTIONAL SHORTENING: 42 % (ref 28–44)
ECHO LV INTERNAL DIMENSION DIASTOLE INDEX: 2.49 CM/M2
ECHO LV INTERNAL DIMENSION DIASTOLIC: 4.8 CM (ref 3.9–5.3)
ECHO LV INTERNAL DIMENSION SYSTOLIC INDEX: 1.45 CM/M2
ECHO LV INTERNAL DIMENSION SYSTOLIC: 2.8 CM
ECHO LV IVSD: 1.4 CM (ref 0.6–0.9)
ECHO LV MASS 2D: 273.8 G (ref 67–162)
ECHO LV MASS INDEX 2D: 141.9 G/M2 (ref 43–95)
ECHO LV POSTERIOR WALL DIASTOLIC: 1.4 CM (ref 0.6–0.9)
ECHO LV RELATIVE WALL THICKNESS RATIO: 0.58
ECHO LVOT AREA: 2.5 CM2
ECHO LVOT AV VTI INDEX: 0.61
ECHO LVOT DIAM: 1.8 CM
ECHO LVOT MEAN GRADIENT: 3 MMHG
ECHO LVOT PEAK GRADIENT: 4 MMHG
ECHO LVOT PEAK VELOCITY: 1.1 M/S
ECHO LVOT STROKE VOLUME INDEX: 30.7 ML/M2
ECHO LVOT SV: 59.3 ML
ECHO LVOT VTI: 23.3 CM
ECHO MV A VELOCITY: 0.89 M/S
ECHO MV E DECELERATION TIME (DT): 289 MS
ECHO MV E VELOCITY: 0.6 M/S
ECHO MV E/A RATIO: 0.67
ECHO MV E/E' LATERAL: 7.69
ECHO MV E/E' RATIO (AVERAGED): 7.96
ECHO MV E/E' SEPTAL: 8.22
ECHO RA AREA 4C: 12.8 CM2
ECHO RA END SYSTOLIC VOLUME APICAL 4 CHAMBER INDEX BSA: 15 ML/M2
ECHO RA VOLUME: 29 ML
ECHO RV TAPSE: 1.7 CM (ref 1.7–?)
EKG ATRIAL RATE: 67 BPM
EKG P AXIS: 60 DEGREES
EKG P-R INTERVAL: 174 MS
EKG Q-T INTERVAL: 420 MS
EKG QRS DURATION: 84 MS
EKG QTC CALCULATION (BAZETT): 443 MS
EKG R AXIS: 29 DEGREES
EKG T AXIS: 44 DEGREES
EKG VENTRICULAR RATE: 67 BPM
EOSINOPHIL # BLD: 0.3 K/UL (ref 0–0.4)
EOSINOPHILS RELATIVE PERCENT: 4 % (ref 0–4)
ERYTHROCYTE [DISTWIDTH] IN BLOOD BY AUTOMATED COUNT: 13.8 % (ref 11.5–14.9)
GFR, ESTIMATED: >90 ML/MIN/1.73M2
GLUCOSE SERPL-MCNC: 105 MG/DL (ref 74–99)
GLUCOSE UR STRIP-MCNC: NEGATIVE MG/DL
HCT VFR BLD AUTO: 37.5 % (ref 36–46)
HGB BLD-MCNC: 12.6 G/DL (ref 12–16)
HGB UR QL STRIP.AUTO: NEGATIVE
KETONES UR STRIP-MCNC: NEGATIVE MG/DL
LEUKOCYTE ESTERASE UR QL STRIP: NEGATIVE
LYMPHOCYTES NFR BLD: 1.5 K/UL (ref 1–4.8)
LYMPHOCYTES RELATIVE PERCENT: 20 % (ref 24–44)
MCH RBC QN AUTO: 32.4 PG (ref 26–34)
MCHC RBC AUTO-ENTMCNC: 33.6 G/DL (ref 31–37)
MCV RBC AUTO: 96.5 FL (ref 80–100)
MONOCYTES NFR BLD: 0.7 K/UL (ref 0.1–1.3)
MONOCYTES NFR BLD: 10 % (ref 1–7)
NEUTROPHILS NFR BLD: 65 % (ref 36–66)
NEUTS SEG NFR BLD: 4.9 K/UL (ref 1.3–9.1)
NITRITE UR QL STRIP: NEGATIVE
PH UR STRIP: 5 [PH] (ref 5–8)
PLATELET # BLD AUTO: 193 K/UL (ref 150–450)
PMV BLD AUTO: 7.1 FL (ref 6–12)
POTASSIUM SERPL-SCNC: 3.8 MMOL/L (ref 3.7–5.3)
PROT UR STRIP-MCNC: NEGATIVE MG/DL
RBC # BLD AUTO: 3.88 M/UL (ref 4–5.2)
SODIUM SERPL-SCNC: 142 MMOL/L (ref 136–145)
SP GR UR STRIP: 1.04 (ref 1–1.03)
UROBILINOGEN UR STRIP-ACNC: NORMAL EU/DL (ref 0–1)
WBC OTHER # BLD: 7.6 K/UL (ref 3.5–11)

## 2024-10-22 PROCEDURE — 95819 EEG AWAKE AND ASLEEP: CPT

## 2024-10-22 PROCEDURE — 99223 1ST HOSP IP/OBS HIGH 75: CPT | Performed by: INTERNAL MEDICINE

## 2024-10-22 PROCEDURE — 97530 THERAPEUTIC ACTIVITIES: CPT

## 2024-10-22 PROCEDURE — G0378 HOSPITAL OBSERVATION PER HR: HCPCS

## 2024-10-22 PROCEDURE — 81003 URINALYSIS AUTO W/O SCOPE: CPT

## 2024-10-22 PROCEDURE — 85025 COMPLETE CBC W/AUTO DIFF WBC: CPT

## 2024-10-22 PROCEDURE — C8929 TTE W OR WO FOL WCON,DOPPLER: HCPCS

## 2024-10-22 PROCEDURE — 97162 PT EVAL MOD COMPLEX 30 MIN: CPT

## 2024-10-22 PROCEDURE — 2580000003 HC RX 258: Performed by: NURSE PRACTITIONER

## 2024-10-22 PROCEDURE — 93010 ELECTROCARDIOGRAM REPORT: CPT | Performed by: INTERNAL MEDICINE

## 2024-10-22 PROCEDURE — 70551 MRI BRAIN STEM W/O DYE: CPT

## 2024-10-22 PROCEDURE — 99222 1ST HOSP IP/OBS MODERATE 55: CPT | Performed by: PSYCHIATRY & NEUROLOGY

## 2024-10-22 PROCEDURE — 95819 EEG AWAKE AND ASLEEP: CPT | Performed by: PSYCHIATRY & NEUROLOGY

## 2024-10-22 PROCEDURE — 6370000000 HC RX 637 (ALT 250 FOR IP): Performed by: INTERNAL MEDICINE

## 2024-10-22 PROCEDURE — 6370000000 HC RX 637 (ALT 250 FOR IP)

## 2024-10-22 PROCEDURE — 93306 TTE W/DOPPLER COMPLETE: CPT | Performed by: INTERNAL MEDICINE

## 2024-10-22 PROCEDURE — 6370000000 HC RX 637 (ALT 250 FOR IP): Performed by: NURSE PRACTITIONER

## 2024-10-22 PROCEDURE — 80048 BASIC METABOLIC PNL TOTAL CA: CPT

## 2024-10-22 PROCEDURE — 2500000003 HC RX 250 WO HCPCS: Performed by: NURSE PRACTITIONER

## 2024-10-22 PROCEDURE — 36415 COLL VENOUS BLD VENIPUNCTURE: CPT

## 2024-10-22 PROCEDURE — 93005 ELECTROCARDIOGRAM TRACING: CPT | Performed by: STUDENT IN AN ORGANIZED HEALTH CARE EDUCATION/TRAINING PROGRAM

## 2024-10-22 PROCEDURE — 6360000004 HC RX CONTRAST MEDICATION: Performed by: NURSE PRACTITIONER

## 2024-10-22 RX ORDER — ATORVASTATIN CALCIUM 40 MG/1
40 TABLET, FILM COATED ORAL DAILY
Status: DISCONTINUED | OUTPATIENT
Start: 2024-10-22 | End: 2024-10-22

## 2024-10-22 RX ORDER — BENZONATATE 100 MG/1
100 CAPSULE ORAL 3 TIMES DAILY PRN
Status: DISCONTINUED | OUTPATIENT
Start: 2024-10-22 | End: 2024-10-25 | Stop reason: HOSPADM

## 2024-10-22 RX ORDER — MAGNESIUM SULFATE HEPTAHYDRATE 40 MG/ML
2000 INJECTION, SOLUTION INTRAVENOUS PRN
Status: DISCONTINUED | OUTPATIENT
Start: 2024-10-22 | End: 2024-10-25 | Stop reason: HOSPADM

## 2024-10-22 RX ORDER — ACETAMINOPHEN 650 MG/1
650 SUPPOSITORY RECTAL EVERY 6 HOURS PRN
Status: DISCONTINUED | OUTPATIENT
Start: 2024-10-22 | End: 2024-10-25 | Stop reason: HOSPADM

## 2024-10-22 RX ORDER — HYDRALAZINE HYDROCHLORIDE 25 MG/1
25 TABLET, FILM COATED ORAL 3 TIMES DAILY
Status: DISCONTINUED | OUTPATIENT
Start: 2024-10-22 | End: 2024-10-25 | Stop reason: HOSPADM

## 2024-10-22 RX ORDER — ONDANSETRON 2 MG/ML
4 INJECTION INTRAMUSCULAR; INTRAVENOUS EVERY 6 HOURS PRN
Status: DISCONTINUED | OUTPATIENT
Start: 2024-10-22 | End: 2024-10-25 | Stop reason: HOSPADM

## 2024-10-22 RX ORDER — BACLOFEN 10 MG/1
10 TABLET ORAL 2 TIMES DAILY
Status: DISCONTINUED | OUTPATIENT
Start: 2024-10-22 | End: 2024-10-25 | Stop reason: HOSPADM

## 2024-10-22 RX ORDER — POLYETHYLENE GLYCOL 3350 17 G/17G
17 POWDER, FOR SOLUTION ORAL DAILY PRN
Status: DISCONTINUED | OUTPATIENT
Start: 2024-10-22 | End: 2024-10-25 | Stop reason: HOSPADM

## 2024-10-22 RX ORDER — POTASSIUM CHLORIDE 750 MG/1
20 CAPSULE, EXTENDED RELEASE ORAL DAILY
Status: DISCONTINUED | OUTPATIENT
Start: 2024-10-22 | End: 2024-10-25 | Stop reason: HOSPADM

## 2024-10-22 RX ORDER — BUSPIRONE HYDROCHLORIDE 5 MG/1
5 TABLET ORAL 3 TIMES DAILY
Status: DISCONTINUED | OUTPATIENT
Start: 2024-10-22 | End: 2024-10-25 | Stop reason: HOSPADM

## 2024-10-22 RX ORDER — LORAZEPAM 0.5 MG/1
0.5 TABLET ORAL
Status: COMPLETED | OUTPATIENT
Start: 2024-10-22 | End: 2024-10-22

## 2024-10-22 RX ORDER — FUROSEMIDE 40 MG/1
40 TABLET ORAL DAILY
Status: DISCONTINUED | OUTPATIENT
Start: 2024-10-22 | End: 2024-10-25 | Stop reason: HOSPADM

## 2024-10-22 RX ORDER — ONDANSETRON 4 MG/1
4 TABLET, ORALLY DISINTEGRATING ORAL EVERY 8 HOURS PRN
Status: DISCONTINUED | OUTPATIENT
Start: 2024-10-22 | End: 2024-10-25 | Stop reason: HOSPADM

## 2024-10-22 RX ORDER — GABAPENTIN 100 MG/1
200 CAPSULE ORAL 2 TIMES DAILY
Status: DISCONTINUED | OUTPATIENT
Start: 2024-10-22 | End: 2024-10-25 | Stop reason: HOSPADM

## 2024-10-22 RX ORDER — SODIUM CHLORIDE 0.9 % (FLUSH) 0.9 %
10 SYRINGE (ML) INJECTION PRN
Status: DISCONTINUED | OUTPATIENT
Start: 2024-10-22 | End: 2024-10-25 | Stop reason: HOSPADM

## 2024-10-22 RX ORDER — ATORVASTATIN CALCIUM 80 MG/1
80 TABLET, FILM COATED ORAL DAILY
Status: DISCONTINUED | OUTPATIENT
Start: 2024-10-23 | End: 2024-10-25 | Stop reason: HOSPADM

## 2024-10-22 RX ORDER — AMLODIPINE BESYLATE 10 MG/1
10 TABLET ORAL DAILY
Status: DISCONTINUED | OUTPATIENT
Start: 2024-10-22 | End: 2024-10-25 | Stop reason: HOSPADM

## 2024-10-22 RX ORDER — SODIUM CHLORIDE 9 MG/ML
INJECTION, SOLUTION INTRAVENOUS PRN
Status: DISCONTINUED | OUTPATIENT
Start: 2024-10-22 | End: 2024-10-25 | Stop reason: HOSPADM

## 2024-10-22 RX ORDER — ACETAMINOPHEN 325 MG/1
650 TABLET ORAL EVERY 6 HOURS PRN
Status: DISCONTINUED | OUTPATIENT
Start: 2024-10-22 | End: 2024-10-25 | Stop reason: HOSPADM

## 2024-10-22 RX ORDER — POTASSIUM CHLORIDE 1500 MG/1
40 TABLET, EXTENDED RELEASE ORAL PRN
Status: DISCONTINUED | OUTPATIENT
Start: 2024-10-22 | End: 2024-10-25 | Stop reason: HOSPADM

## 2024-10-22 RX ORDER — SODIUM CHLORIDE 0.9 % (FLUSH) 0.9 %
5-40 SYRINGE (ML) INJECTION EVERY 12 HOURS SCHEDULED
Status: DISCONTINUED | OUTPATIENT
Start: 2024-10-22 | End: 2024-10-25 | Stop reason: HOSPADM

## 2024-10-22 RX ORDER — LIDOCAINE 4 G/G
1 PATCH TOPICAL NIGHTLY
Status: DISCONTINUED | OUTPATIENT
Start: 2024-10-22 | End: 2024-10-25 | Stop reason: HOSPADM

## 2024-10-22 RX ORDER — ASPIRIN 81 MG/1
81 TABLET ORAL DAILY
Status: DISCONTINUED | OUTPATIENT
Start: 2024-10-22 | End: 2024-10-25 | Stop reason: HOSPADM

## 2024-10-22 RX ORDER — CARVEDILOL 25 MG/1
25 TABLET ORAL 2 TIMES DAILY WITH MEALS
Status: DISCONTINUED | OUTPATIENT
Start: 2024-10-22 | End: 2024-10-25 | Stop reason: HOSPADM

## 2024-10-22 RX ORDER — BISACODYL 10 MG
10 SUPPOSITORY, RECTAL RECTAL DAILY PRN
Status: DISCONTINUED | OUTPATIENT
Start: 2024-10-22 | End: 2024-10-25 | Stop reason: HOSPADM

## 2024-10-22 RX ORDER — LANOLIN ALCOHOL/MO/W.PET/CERES
3 CREAM (GRAM) TOPICAL NIGHTLY PRN
Status: DISCONTINUED | OUTPATIENT
Start: 2024-10-22 | End: 2024-10-25 | Stop reason: HOSPADM

## 2024-10-22 RX ADMIN — SERTRALINE 25 MG: 50 TABLET, FILM COATED ORAL at 08:55

## 2024-10-22 RX ADMIN — BUSPIRONE HYDROCHLORIDE 5 MG: 5 TABLET ORAL at 20:14

## 2024-10-22 RX ADMIN — LORAZEPAM 0.5 MG: 0.5 TABLET ORAL at 16:47

## 2024-10-22 RX ADMIN — GABAPENTIN 200 MG: 100 CAPSULE ORAL at 20:14

## 2024-10-22 RX ADMIN — HYDRALAZINE HYDROCHLORIDE 25 MG: 25 TABLET ORAL at 15:01

## 2024-10-22 RX ADMIN — BUSPIRONE HYDROCHLORIDE 5 MG: 5 TABLET ORAL at 08:55

## 2024-10-22 RX ADMIN — SERTRALINE 25 MG: 50 TABLET, FILM COATED ORAL at 15:01

## 2024-10-22 RX ADMIN — POTASSIUM CHLORIDE 20 MEQ: 750 CAPSULE, EXTENDED RELEASE ORAL at 08:55

## 2024-10-22 RX ADMIN — Medication 3 MG: at 20:14

## 2024-10-22 RX ADMIN — BACLOFEN 10 MG: 10 TABLET ORAL at 08:55

## 2024-10-22 RX ADMIN — ACETAMINOPHEN 650 MG: 325 TABLET ORAL at 11:06

## 2024-10-22 RX ADMIN — GABAPENTIN 200 MG: 100 CAPSULE ORAL at 08:55

## 2024-10-22 RX ADMIN — BENZONATATE 100 MG: 100 CAPSULE ORAL at 11:12

## 2024-10-22 RX ADMIN — BACLOFEN 10 MG: 10 TABLET ORAL at 20:14

## 2024-10-22 RX ADMIN — CARVEDILOL 25 MG: 25 TABLET, FILM COATED ORAL at 18:15

## 2024-10-22 RX ADMIN — ANTI-FUNGAL POWDER MICONAZOLE NITRATE TALC FREE: 1.42 POWDER TOPICAL at 22:35

## 2024-10-22 RX ADMIN — ATORVASTATIN CALCIUM 40 MG: 40 TABLET, FILM COATED ORAL at 08:56

## 2024-10-22 RX ADMIN — BUSPIRONE HYDROCHLORIDE 5 MG: 5 TABLET ORAL at 15:01

## 2024-10-22 RX ADMIN — ACETAMINOPHEN 650 MG: 325 TABLET ORAL at 04:38

## 2024-10-22 RX ADMIN — APIXABAN 5 MG: 5 TABLET, FILM COATED ORAL at 08:55

## 2024-10-22 RX ADMIN — AMLODIPINE BESYLATE 10 MG: 10 TABLET ORAL at 08:55

## 2024-10-22 RX ADMIN — CARVEDILOL 25 MG: 25 TABLET, FILM COATED ORAL at 08:56

## 2024-10-22 RX ADMIN — SODIUM CHLORIDE, PRESERVATIVE FREE 10 ML: 5 INJECTION INTRAVENOUS at 20:13

## 2024-10-22 RX ADMIN — HYDRALAZINE HYDROCHLORIDE 25 MG: 25 TABLET ORAL at 08:55

## 2024-10-22 RX ADMIN — APIXABAN 5 MG: 5 TABLET, FILM COATED ORAL at 20:14

## 2024-10-22 RX ADMIN — FUROSEMIDE 40 MG: 40 TABLET ORAL at 08:56

## 2024-10-22 RX ADMIN — ASPIRIN 81 MG: 81 TABLET, COATED ORAL at 11:06

## 2024-10-22 RX ADMIN — SERTRALINE 25 MG: 50 TABLET, FILM COATED ORAL at 20:15

## 2024-10-22 RX ADMIN — PERFLUTREN 2 ML: 6.52 INJECTION, SUSPENSION INTRAVENOUS at 11:38

## 2024-10-22 RX ADMIN — SODIUM CHLORIDE, PRESERVATIVE FREE 10 ML: 5 INJECTION INTRAVENOUS at 08:56

## 2024-10-22 ASSESSMENT — PAIN DESCRIPTION - FREQUENCY: FREQUENCY: CONTINUOUS

## 2024-10-22 ASSESSMENT — PAIN DESCRIPTION - LOCATION
LOCATION: NECK;SHOULDER
LOCATION: SHOULDER

## 2024-10-22 ASSESSMENT — PAIN SCALES - GENERAL
PAINLEVEL_OUTOF10: 5
PAINLEVEL_OUTOF10: 3
PAINLEVEL_OUTOF10: 5
PAINLEVEL_OUTOF10: 2
PAINLEVEL_OUTOF10: 0

## 2024-10-22 ASSESSMENT — PAIN DESCRIPTION - ORIENTATION
ORIENTATION: LEFT
ORIENTATION: LEFT

## 2024-10-22 ASSESSMENT — PAIN DESCRIPTION - DESCRIPTORS
DESCRIPTORS: ACHING
DESCRIPTORS: ACHING

## 2024-10-22 ASSESSMENT — PAIN DESCRIPTION - ONSET: ONSET: ON-GOING

## 2024-10-22 ASSESSMENT — PAIN DESCRIPTION - PAIN TYPE: TYPE: CHRONIC PAIN

## 2024-10-22 NOTE — ED PROVIDER NOTES
[facility] ED  Emergency Department Encounter  Emergency Medicine Resident     Pt Name:Criselda Tinoco  MRN: [id number]  Birthdate 1950  Date of evaluation: 10/21/24  PCP:  Wood St MD  Note Started: 11:56 PM EDT      CHIEF COMPLAINT       Chief Complaint   Patient presents with    Aphasia       HISTORY OF PRESENT ILLNESS  (Location/Symptom, Timing/Onset, Context/Setting, Quality, Duration, Modifying Factors, Severity.)      Criselda Tinoco is a 74 y.o. female who presents with aphasia.  Patient was brought in by EMS, was a stroke alert on arrival.  Patient's last known well was approximately 6 PM.  EMS was called by the patient's neighbors as she had aphasia/slurred speech.  Patient is on Eliquis, reportedly had a fall yesterday however did not go to the emergency department after that time.  Patient did not have any other focal neurologic deficits, equal strength and sensation, was alert and oriented x 4.  Patient was protecting her airway.    PAST MEDICAL / SURGICAL / SOCIAL / FAMILY HISTORY      has a past medical history of Allergic rhinitis, cause unspecified, Back pain, Bowel obstruction ([facility]), C. difficile diarrhea, CAD (coronary artery disease), Cardiac murmur, Cellulitis, Cerebral artery occlusion with cerebral infarction ([facility]), COVID-19, Diverticulosis of colon (without mention of hemorrhage), GERD (gastroesophageal reflux disease), History of blood transfusion, History of CHF (congestive heart failure), History of MI (myocardial infarction), History of ovarian cyst, History of peritonitis, HTN (hypertension), Hx of blood clots, Hyperlipidemia, Intestinal or peritoneal adhesions with obstruction (postoperative) (postinfection) (HCC), Kidney infection, Lateral epicondylitis  of elbow, MDRO (multiple drug resistant organisms) resistance, Muscle strain, Other abnormal glucose, PONV (postoperative nausea and vomiting), Pre-diabetes, Restless legs syndrome (RLS), Snores, Stenosis of cervical  alert due to aphasia.  On my evaluation patient was alert and oriented x 4, speaking in complete sentences.  Patient did not have any focal neurologic deficits.  Patient is on Eliquis, reportedly had a fall yesterday.  Patient's vital signs are stable, patient is afebrile.  Patient was brought immediately to the CT scanner.  Will obtain CT head, CTA head and neck, CT cervical spine.      Amount and/or Complexity of Data Reviewed  Labs: ordered.  Radiology: ordered. Decision-making details documented in ED Course.  ECG/medicine tests: ordered.    Risk  Prescription drug management.  Decision regarding hospitalization.        EKG  Ventricular rate 67, QRS 84, QTc 443  Normal axis  No acute ST, T wave abnormalities  Normal ECG    All EKG's are interpreted by the Emergency Department Physician who either signs or Co-signs this chart in the absence of a cardiologist.    EMERGENCY DEPARTMENT COURSE:      ED Course as of 10/22/24 0014   Tue Oct 22, 2024   0005 CTA HEAD NECK W CONTRAST  IMPRESSION:  1. No acute arterial abnormality or hemodynamically significant arterial  stenosis in the head or neck.  2. No acute fracture or traumatic malalignment of the cervical spine.  3. Changes of prior C3 through C6 laminectomies and anterior C6-7 and  posterior C2-T2 surgical fusion in near anatomic alignment.  No evidence of  hardware failure or loosening.      [MW]   0013 Patient's workup was grossly unremarkable, troponin was at the patient's baseline, no leukocytosis, no significant anemia, CMP was grossly unremarkable.  Did speak with the teleneurologist who recommended the patient be admitted for further workup including MRI and EEG.  Will admit the patient at this time. [MW]      ED Course User Index  [MW] Taiwo Storm MD       PROCEDURES:      CONSULTS:  None    CRITICAL CARE:  There was significant risk of life threatening deterioration of patient's condition requiring my direct management. Critical care time  minutes,

## 2024-10-22 NOTE — CONSULTS
[facility] Neurology   IN-PATIENT SERVICE      NEUROLOGY CONSULT  NOTE            Date:   10/22/2024  Patient name:  Criselda Tinoco  Date of admission:  10/21/2024  YOB: 1950      Chief Complaint:     Chief Complaint   Patient presents with    Aphasia       Reason for Consult:      Aphasia     History of Present Illness:     The patient is a 74 y.o. female who presents with Aphasia  . The patient was seen and examined and the chart was reviewed.     This is a 74 year old female medically complicated history with cervical myelopathy s/p decompression with residual L sided weakness and at baseline does not ambulate, CVA with residual aphasia, TIAs, L sided facial droop, CAD, lumbar radiculopathy, CHF, MI, HTN who presented to the ED as a stroke alert. LKW around 6 p.m. when EMS was called by patients neighbors as they noted she had slurred speech. She is on Eliquis. She did suffer a recent fall. She states she has been under a lot of financial stress due to medical issues.     HCT showed NAP, CTA with no LVO, C-spine changes seen from previous surgeries.      Patient has been seen by Neurology in the past. She was seen back on 3/24 where she presented with aphasia and L sided weakness. MRI Brain and MRA were unrevealing at the time.      Past Medical History:     Past Medical History:   Diagnosis Date    Allergic rhinitis, cause unspecified     Back pain     lumbar    Bowel obstruction (HCC)     history of due to scar tissue, resolved non-surgically    C. difficile diarrhea     CAD (coronary artery disease)     no stent needed per pt. Dr. Solorzano did cath at  2005    Cardiac murmur     Cellulitis 2017 August    leg left leg/bug bite    Cerebral artery occlusion with cerebral infarction (HCC)     TIA 2014    COVID-19     ONE YR AGO IN 4/25/2020 fever and cough    Diverticulosis of colon (without mention of hemorrhage)     GERD (gastroesophageal reflux disease)     on rx    History of blood transfusion      internal carotid arteries are normal in course and  caliber without focal stenosis. The anterior cerebral and middle cerebral  arteries demonstrate no focal stenosis.    POSTERIOR CIRCULATION: The posterior cerebral arteries demonstrate no focal  stenosis. The vertebral and basilar arteries appear unremarkable.    No intracranial aneurysm.    MRA neck:    Limited by motion artifact.    Common and internal carotid arteries: The bilateral common carotid arteries  are within normal limits without flow limiting stenosis.  The bilateral  internal carotid arteries are within normal limits without flow limiting  stenosis by NASCET criteria.  There is no dissection or vascular injury.    Vertebral arteries:  The bilateral vertebral arteries are within normal  limits without focal stenosis or dissection.    Impression  Limited by motion artifact.    No acute intracranial abnormality.    Mild chronic microvascular disease.    Unremarkable MRA of the head.    Unremarkable MRA of the neck.    No results found for this or any previous visit.    No results found for this or any previous visit.    Results for orders placed during the hospital encounter of 10/21/24    CT HEAD WO CONTRAST    Addendum 10/22/2024 12:36 AM  ADDENDUM:  Results relayed to Dr. Taiwo garland set 11:29 p.m.    Narrative  EXAMINATION:  CT OF THE HEAD WITHOUT CONTRAST  10/21/2024 10:55 pm    TECHNIQUE:  CT of the head was performed without the administration of intravenous  contrast. Automated exposure control, iterative reconstruction, and/or weight  based adjustment of the mA/kV was utilized to reduce the radiation dose to as  low as reasonably achievable.    COMPARISON:  MR brain March 9, 2024 and CT head March 9, 2024    HISTORY:  ORDERING SYSTEM PROVIDED HISTORY: Stroke  TECHNOLOGIST PROVIDED HISTORY:    Stroke  Decision Support Exception - unselect if not a suspected or confirmed  emergency medical condition->Emergency Medical Condition (MA)  Reason for

## 2024-10-22 NOTE — CONSULTS
Date:   10/22/2024  Patient name: Criselda Tinoco  Date of admission:  10/21/2024 10:51 PM  MRN:   [id number]  YOB: 1950  PCP: [address] MD    Reason for Admission: TIA (transient ischemic attack) [G45.9]  Slurred speech [R47.81]    Cardiology consult       Referring physician: Dr Jm Epstein    Impression    Admission with TIA, sudden onset speech disturbance  Previous history of CVA, left-sided weakness, residual aphasia  Congestive heart failure diastolic  Diabetes mellitus type 2  History of hypertension  History of hyperlipidemia  History of DVT on Eliquis  Very poor mobility since cervical spine fusion surgery 7/26/2023, gait instability, left-sided weakness  Admission 5/31/2024 for chest pain like pressure, normal ECG and normal high-sensitivity troponin    Past past surgical history  Abdominal surgery, appendectomy, oophorectomy, cardiac cath no stent cervical fusion, hysterectomy, EGD and colonoscopies  Drug allergy Fentanyl, Bactrim, diazepam      History of present illness    74-year-old  female with a past medical history of diabetes mellitus type 2, COPD, history of DVT, chronically anticoagulated on Eliquis, history of cerebral infarction with a chronic left-sided weakness presented to the emergency room with aphasia.  According to the patient she was at home earlier this evening when her  noticed a change in her speech and called 911.  Patient stated that her daughter visited at 4:30 PM and she was fine at that time however about 5:30 PM she began talking goofy.  She mentions she fell in her bathtub a day before admission and hit the back of her head.  She denied losing consciousness and she did not go to the emergency department.  She said that she was at [facility] for 9 months for rehabilitation therapy and has only been home for the last 3 months.  Stroke alert was called prior to the arrival.  CT head shows no acute intracranial abnormality.  He CTA of head  Frequent falls     Double vision     Muscle soreness     Peptic ulcer     Melena     PUD (peptic ulcer disease)     Absolute anemia     Acute postoperative anemia due to expected blood loss     Acute renal failure (HCC)     Clostridium difficile infection     Acquired spondylolisthesis     Spinal stenosis of lumbar region with neurogenic claudication     Acute deep vein thrombosis (DVT) of proximal vein of left lower extremity (HCC)     Leg swelling     Back pain     Neurological disorder     Closed unstable burst fracture of fifth lumbar vertebra with routine healing     Slow transit constipation     Acute deep vein thrombosis (DVT) of femoral vein of left lower extremity (HCC)     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 fracture     Erysipelas of right lower extremity     Closed fracture of body of sternum     Calculus of gallbladder without cholecystitis without obstruction     Leukocytosis     Allergy to sulfa drugs     Bilateral cellulitis of lower leg     Lymphedema of both lower extremities     Cerebral infarction, unspecified (HCC)     Chronic embolism and thrombosis of unspecified deep veins of proximal lower extremity, bilateral (HCC)     Other specified disorders of bone density and structure, unspecified site     Repeated falls     Fall as cause of accidental injury in home as place of occurrence, initial encounter     Cellulitis     Chronic anticoagulation     History of DVT (deep vein thrombosis)     Prediabetes     History of CVA with residual deficit     Cervical myopathy     Hypervolemia     Congenital kyphosis of cervical region     Status post

## 2024-10-22 NOTE — ED TRIAGE NOTES
Mode of arrival (squad #, walk in, police, etc) : EMS        Chief complaint(s): Aphasia         Arrival Note (brief scenario, treatment PTA, etc).: Pt arrived via EMS and taken straight to CT as a stroke alert. The patient's  called EMS concerned for possible stroke due to aphasia and slurred speech. .

## 2024-10-22 NOTE — PLAN OF CARE
0800)  Absence of Physical Injury: Implement safety measures based on patient assessment  10/22/2024 0603 by Cleo Park RN  Outcome: Progressing  Flowsheets (Taken 10/22/2024 0603)  Absence of Physical Injury: Implement safety measures based on patient assessment     Problem: Neurosensory - Adult  Goal: Achieves maximal functionality and self care  Outcome: Progressing  Flowsheets (Taken 10/22/2024 0731)  Achieves maximal functionality and self care:   Monitor swallowing and airway patency with patient fatigue and changes in neurological status   Encourage and assist patient to increase activity and self care with guidance from physical therapy/occupational therapy     Problem: Respiratory - Adult  Goal: Achieves optimal ventilation and oxygenation  Outcome: Progressing  Flowsheets (Taken 10/22/2024 0731)  Achieves optimal ventilation and oxygenation:   Assess for changes in respiratory status   Assess for changes in mentation and behavior   Oxygen supplementation based on oxygen saturation or arterial blood gases   Position to facilitate oxygenation and minimize respiratory effort     Problem: Skin/Tissue Integrity - Adult  Goal: Skin integrity remains intact  Outcome: Progressing  Flowsheets  Taken 10/22/2024 0800  Skin Integrity Remains Intact: Monitor for areas of redness and/or skin breakdown  Taken 10/22/2024 0731  Skin Integrity Remains Intact: Monitor for areas of redness and/or skin breakdown     Problem: Musculoskeletal - Adult  Goal: Return mobility to safest level of function  Outcome: Progressing  Flowsheets (Taken 10/22/2024 0731)  Return Mobility to Safest Level of Function:   Assess patient stability and activity tolerance for standing, transferring and ambulating with or without assistive devices   Assist with transfers and ambulation using safe patient handling equipment as needed   Ensure adequate protection for wounds/incisions during mobilization   Obtain physical therapy/occupational  Social Work and notify Licensed Independent Practitioner   Provide emotional support, including active listening and acknowledgment of concerns

## 2024-10-22 NOTE — ACP (ADVANCE CARE PLANNING)
Advance Care Planning     Advance Care Planning Activator (Inpatient)  Conversation Note      Date of ACP Conversation: 10/22/2024     Conversation Conducted with: Patient with Decision Making Capacity    ACP Activator: Preeti Fortune RN        Health Care Decision Maker:     Current Designated Health Care Decision Maker:     Primary Decision Maker: True Tinoco - Spouse - [phone]    Secondary Decision Maker: Lesly Clark - Child - [phone]  Click here to complete Healthcare Decision Makers including section of the Healthcare Decision Maker Relationship (ie \"Primary\")      Care Preferences    Ventilation:  \"If you were in your present state of health and suddenly became very ill and were unable to breathe on your own, what would your preference be about the use of a ventilator (breathing machine) if it were available to you?\"      Would the patient desire the use of ventilator (breathing machine)?: yes    \"If your health worsens and it becomes clear that your chance of recovery is unlikely, what would your preference be about the use of a ventilator (breathing machine) if it were available to you?\"     Would the patient desire the use of ventilator (breathing machine)?: Yes      Resuscitation  \"CPR works best to restart the heart when there is a sudden event, like a heart attack, in someone who is otherwise healthy. Unfortunately, CPR does not typically restart the heart for people who have serious health conditions or who are very sick.\"    \"In the event your heart stopped as a result of an underlying serious health condition, would you want attempts to be made to restart your heart (answer \"yes\" for attempt to resuscitate) or would you prefer a natural death (answer \"no\" for do not attempt to resuscitate)?\" yes       [] Yes   [] No   Educated Patient / Decision Maker regarding differences between Advance Directives and portable DNR orders.    Length of ACP Conversation in minutes:      Conversation  Outcomes:      Follow-up plan:    [] Schedule follow-up conversation to continue planning  [] Referred individual to Provider for additional questions/concerns   [] Advised patient/agent/surrogate to review completed ACP document and update if needed with changes in condition, patient preferences or care setting    [] This note routed to one or more involved healthcare providers

## 2024-10-22 NOTE — ED PROVIDER NOTES
EMERGENCY DEPARTMENT ENCOUNTER   ATTENDING ATTESTATION     Pt Name: Criselda Tinoco  MRN: [id number]  Birthdate 1950  Date of evaluation: 10/21/24       Criselda Tinoco is a 74 y.o. female who presents with Aphasia    Patient presents with some slurred speech    Happened about 5 hours ago    Already on Eliquis    Not a candidate for thrombolytics initiating stroke workup    MDM:     \Initial workup unremarkable    Patient will be admitted for MRI and EEG    Vitals:   Vitals:    10/21/24 2321   BP: (!) 135/53   Pulse: 66   Resp: 14   Temp: 97.6 °F (36.4 °C)   TempSrc: Oral   SpO2: 95%   Weight: 90.7 kg (200 lb)   Height: 1.6 m (5' 3\")         I personally saw and examined the patient. I have reviewed and agree with the resident's findings, including all diagnostic interpretations and treatment plan as written. I was present for the key portions of any procedures performed and the inclusive time noted for any critical care statement.    Hakeem Nazario MD  Attending Emergency Physician            Hakeem Nazario MD  10/22/24 0047

## 2024-10-22 NOTE — PROGRESS NOTES
10/22/24 1511   Encounter Summary   Encounter Overview/Reason Initial Encounter   Service Provided For Patient not available  (Staff with PT)

## 2024-10-22 NOTE — PROGRESS NOTES
[facility] Internal Medicine  Jung Machado MD; Jean Salguero MD; Jm Epstein MD; MD Virginia Louise MD; Beau Anderson MD  [facility] Internal Medicine   IN-PATIENT SERVICE  [facility]                 Date:   10/22/2024  Patientname:  Criselda Tinoco  Date of admission:  10/21/2024 10:51 PM  MRN:   [id number]  Account:  [id number]  YOB: 1950  PCP:    [address] MD  Room:   2115/2115-01  Code Status:    Full Code      Chief Complaint:     Chief Complaint   Patient presents with    Aphasia       History of Present Illness:     Criselda Tinoco is a 74 y.o. Non- / non  female who presents with Aphasia   and is admitted to the hospital for the management of TIA (transient ischemic attack).    Patient's medical history significant for DVT -chronically anticoagulated on Eliquis, cerebral infarction with chronic left-sided weakness, COPD, and DM type II.    According to patient, she was at home earlier this evening, when her  noticed a change in her speech and called 911.  Patient states that her daughter visited at 4:30 PM and she was fine at that time; however, about 530 she began \"talking goofy.\".  Patient reports that she fell in her bathtub yesterday and hit the back of her head.  She denies losing consciousness and did not come to be evaluated in the ED.  She is chronically anticoagulated on Eliquis.  Patient reports that she resided at [facility] for 9 months for rehabilitative therapy and has only been home for the past 3 months.    Stroke alert was called prior to arrival.  CT head shows no acute intercranial abnormality.  CTA of the head and neck and CT of C-spine shows no acute arterial abnormality or hemodynamically significant arterial stenosis in the head or neck. No acute fracture or traumatic malalignment of the cervical spine. Changes of prior C3 through C6 laminectomies and anterior C6-7 and posterior C2-T2 surgical  fusion in near anatomic alignment.  No evidence of hardware failure or loosening.    Neurosurgery consulted in ED NIH score 0.  Recommends admission with MRI brain and EEG in the a.m.  Neurology consulted.    Unable to reconcile patient's home meds as she did not bring her purse with her current med list and only knows that she takes eliquis and coreg.  RN to contact patient's  in the a.m. to reconcile medications.    Past Medical History:     Past Medical History:   Diagnosis Date    Allergic rhinitis, cause unspecified     Back pain     lumbar    Bowel obstruction (HCC)     history of due to scar tissue, resolved non-surgically    C. difficile diarrhea     CAD (coronary artery disease)     no stent needed per pt. Dr. Solorzano did cath at [facility] 2005    Cardiac murmur     Cellulitis 2017 August    leg left leg/bug bite    Cerebral artery occlusion with cerebral infarction (HCC)     TIA 2014    COVID-19     ONE YR AGO IN 4/25/2020 fever and cough    Diverticulosis of colon (without mention of hemorrhage)     GERD (gastroesophageal reflux disease)     on rx    History of blood transfusion     approx 2020    -no reactions    History of CHF (congestive heart failure)     History of MI (myocardial infarction) 2005    thought due to a blood clot    History of ovarian cyst 1970    had oopherectomy holly    History of peritonitis 1968    due to ruptured appendix age 17    HTN (hypertension)     Hx of blood clots     right leg    Hyperlipidemia     Intestinal or peritoneal adhesions with obstruction (postoperative) (postinfection) (HCC)     Kidney infection     renal failure/sepsis/spider bite    Lateral epicondylitis  of elbow     MDRO (multiple drug resistant organisms) resistance     c diff    Muscle strain     right posterior shoulder    Other abnormal glucose     PONV (postoperative nausea and vomiting)     dry heaves    Pre-diabetes     Restless legs syndrome (RLS)     Snores     no cpap    Stenosis of cervical spine

## 2024-10-22 NOTE — PROGRESS NOTES
fully erect. pt stood twice for approx 20 sec each  Ambulation  Comments: not attempted     Balance  Sitting - Static: Fair;+ (SBA)  Sitting - Dynamic: Fair (CGA)  Standing - Static: Poor (maxA)                 AM-PAC - Mobility    AM-PAC Basic Mobility - Inpatient   How much help is needed turning from your back to your side while in a flat bed without using bedrails?: Total  How much help is needed moving from lying on your back to sitting on the side of a flat bed without using bedrails?: Total  How much help is needed moving to and from a bed to a chair?: Total  How much help is needed standing up from a chair using your arms?: Total  How much help is needed walking in hospital room?: Total  How much help is needed climbing 3-5 steps with a railing?: None (NA)  AM-PAC Inpatient Mobility Raw Score : 9  AM-PAC Inpatient T-Scale Score : 30.55  Mobility Inpatient CMS 0-100% Score: 81.38  Mobility Inpatient CMS G-Code Modifier : CM           Goals  Short Term Goals  Time Frame for Short Term Goals: 2-3 days  Short Term Goal 1: pt able to stand with modA of 1 from various surfaces  Short Term Goal 2: pt able to pivot transfer with modA of 1 from various surfaces  Short Term Goal 3: pt able to stand with RW and CGA x 1-2 min  Short Term Goal 4: pt able to tolerate 20-25 min of therapeutic activity/exercises for improved endurance                Therapy Time   Individual Concurrent Group Co-treatment   Time In 1510         Time Out 1545         Minutes 35         Timed Code Treatment Minutes: 20 Minutes       Jackelin Valencia, PT

## 2024-10-22 NOTE — PROGRESS NOTES
MEENU Gallo reports that she spoke with patient's daughter who helps her with her medications.  Home meds have been verified.  New orders entered.

## 2024-10-22 NOTE — PROCEDURES
PROCEDURE NOTE  Date: 10/22/2024   Name: Criselda Tinoco  YOB: 1950    Procedures    EEG REPORT     CLINICAL NEUROPHYSIOLOGY LABORATORY  DEPARTMENT OF NEUROLOGY  [facility]       Patient: Criselda Tinoco Age: 74 y.o.  MRN: [id number]      Referring Provider: No ref. provider found    History: This routine 30 minute scalp EEG was recorded with video- monitoring for a 74 y.o.. female  who presented with encephalopathy. This EEG was performed to evaluate for focal and epileptiform abnormalities.     Criselda Tinoco   Current Facility-Administered Medications   Medication Dose Route Frequency Provider Last Rate Last Admin    sodium chloride flush 0.9 % injection 5-40 mL  5-40 mL IntraVENous 2 times per day Ayah Talbert APRN - CNP   10 mL at 10/22/24 0856    sodium chloride flush 0.9 % injection 10 mL  10 mL IntraVENous PRN Ayah Talbert APRN - CNP        0.9 % sodium chloride infusion   IntraVENous PRN Ayah Talbert APRN - CNP        potassium chloride (KLOR-CON M) extended release tablet 40 mEq  40 mEq Oral PRN Ayah Talbert APRN - CNP        Or    potassium bicarb-citric acid (EFFER-K) effervescent tablet 40 mEq  40 mEq Oral PRN Ayah Talbert APRN - CNP        magnesium sulfate 2000 mg in water 50 mL IVPB  2,000 mg IntraVENous PRN Ayah Talbert APRN - CNP        ondansetron (ZOFRAN-ODT) disintegrating tablet 4 mg  4 mg Oral Q8H PRN Ayah Talbert APRN - CNP        Or    ondansetron (ZOFRAN) injection 4 mg  4 mg IntraVENous Q6H PRN Ayah Talbert APRN - CNP        melatonin tablet 3 mg  3 mg Oral Nightly PRN Ayah Talbert APRN - CNP        polyethylene glycol (GLYCOLAX) packet 17 g  17 g Oral Daily PRN Ayah Talbert APRN - CNP        bisacodyl (DULCOLAX) suppository 10 mg  10 mg Rectal Daily PRN Ayah Talbert APRN - CNP        acetaminophen (TYLENOL) tablet 650 mg  650 mg Oral Q6H PRN Ayah Talbert APRN - CNP   650 mg at 10/22/24 1106    Or    acetaminophen (TYLENOL) suppository 650 mg  650 mg  Rectal Q6H PRN Ayah Talbert APRN - CNP        miconazole (MICOTIN) 2 % powder   Topical BID Ayah Talbert APRN - CNP        apixaban (ELIQUIS) tablet 5 mg  5 mg Oral BID Ayah Talbert APRN - CNP   5 mg at 10/22/24 0855    carvedilol (COREG) tablet 25 mg  25 mg Oral BID WC Ayah Talbert APRN - CNP   25 mg at 10/22/24 0856    amLODIPine (NORVASC) tablet 10 mg  10 mg Oral Daily Ayah Talbert APRN - CNP   10 mg at 10/22/24 0855    baclofen (LIORESAL) tablet 10 mg  10 mg Oral BID Ayah Talbert APRN - CNP   10 mg at 10/22/24 0855    busPIRone (BUSPAR) tablet 5 mg  5 mg Oral TID Ayah Talbert APRN - CNP   5 mg at 10/22/24 1501    furosemide (LASIX) tablet 40 mg  40 mg Oral Daily Ayah Talbert APRN - CNP   40 mg at 10/22/24 0856    gabapentin (NEURONTIN) capsule 200 mg  200 mg Oral BID Ayah Talbert APRN - CNP   200 mg at 10/22/24 0855    hydrALAZINE (APRESOLINE) tablet 25 mg  25 mg Oral TID Ayah Talbert APRN - CNP   25 mg at 10/22/24 1501    lidocaine 4 % external patch 1 patch  1 patch TransDERmal Nightly Ayah Talbert APRN - CNP        sertraline (ZOLOFT) tablet 25 mg  25 mg Oral TID Ayah Talbert APRN - CNP   25 mg at 10/22/24 1501    potassium chloride (MICRO-K) extended release capsule 20 mEq  20 mEq Oral Daily Ayah Talbert APRN - CNP   20 mEq at 10/22/24 0855    [START ON 10/23/2024] atorvastatin (LIPITOR) tablet 80 mg  80 mg Oral Daily Jm Epstein MD        aspirin EC tablet 81 mg  81 mg Oral Daily Jm Epstein MD   81 mg at 10/22/24 1106    benzonatate (TESSALON) capsule 100 mg  100 mg Oral TID PRN Jm Epstein MD   100 mg at 10/22/24 1112    LORazepam (ATIVAN) tablet 0.5 mg  0.5 mg Oral Once PRN Doris, Prabhsharn S, MD            Technical Description: This is a 21 channel digital EEG recording with time-locked video. Electrodes were placed in accordance with the 10-20 International System of Electrode Placement. Single lead EKG monitoring as well as temporal electrodes were

## 2024-10-22 NOTE — ED NOTES
Report given to MEENU Gallo from U.   Report method by phone   The following was reviewed with receiving RN:   Current vital signs:  /77   Pulse 66   Temp 97.6 °F (36.4 °C) (Oral)   Resp 16   Ht 1.6 m (5' 3\")   Wt 90.7 kg (200 lb)   SpO2 95%   BMI 35.43 kg/m²                MEWS Score: 0     Any medication or safety alerts were reviewed. Any pending diagnostics and notifications were also reviewed, as well as any safety concerns or issues, abnormal labs, abnormal imaging, and abnormal assessment findings. Questions were answered.

## 2024-10-22 NOTE — PLAN OF CARE
Problem: Chronic Conditions and Co-morbidities  Goal: Patient's chronic conditions and co-morbidity symptoms are monitored and maintained or improved  Outcome: Progressing  Flowsheets (Taken 10/22/2024 0603)  Care Plan - Patient's Chronic Conditions and Co-Morbidity Symptoms are Monitored and Maintained or Improved:   Monitor and assess patient's chronic conditions and comorbid symptoms for stability, deterioration, or improvement   Collaborate with multidisciplinary team to address chronic and comorbid conditions and prevent exacerbation or deterioration   Update acute care plan with appropriate goals if chronic or comorbid symptoms are exacerbated and prevent overall improvement and discharge     Problem: Discharge Planning  Goal: Discharge to home or other facility with appropriate resources  Outcome: Progressing  Flowsheets (Taken 10/22/2024 0603)  Discharge to home or other facility with appropriate resources: Identify barriers to discharge with patient and caregiver     Problem: Skin/Tissue Integrity  Goal: Absence of new skin breakdown  Description: 1.  Monitor for areas of redness and/or skin breakdown  2.  Assess vascular access sites hourly  3.  Every 4-6 hours minimum:  Change oxygen saturation probe site  4.  Every 4-6 hours:  If on nasal continuous positive airway pressure, respiratory therapy assess nares and determine need for appliance change or resting period.  Outcome: Progressing     Problem: Safety - Adult  Goal: Free from fall injury  Outcome: Progressing  Flowsheets (Taken 10/22/2024 0603)  Free From Fall Injury: Instruct family/caregiver on patient safety     Problem: ABCDS Injury Assessment  Goal: Absence of physical injury  Outcome: Progressing  Flowsheets (Taken 10/22/2024 0603)  Absence of Physical Injury: Implement safety measures based on patient assessment

## 2024-10-22 NOTE — H&P
Troponin    Collection Time: 10/21/24 10:55 PM   Result Value Ref Range    Troponin, High Sensitivity 20 (H) 0 - 14 ng/L   TSH with Reflex    Collection Time: 10/21/24 10:55 PM   Result Value Ref Range    TSH 3.24 0.27 - 4.20 uIU/mL   EKG 12 Lead    Collection Time: 10/22/24 12:04 AM   Result Value Ref Range    Ventricular Rate 67 BPM    Atrial Rate 67 BPM    P-R Interval 174 ms    QRS Duration 84 ms    Q-T Interval 420 ms    QTc Calculation (Bazett) 443 ms    P Axis 60 degrees    R Axis 29 degrees    T Axis 44 degrees   Urinalysis with Reflex to Culture    Collection Time: 10/22/24  4:00 AM    Specimen: Urine   Result Value Ref Range    Color, UA Yellow Yellow    Turbidity UA Clear Clear    Glucose, Ur NEGATIVE NEGATIVE mg/dL    Bilirubin, Urine NEGATIVE NEGATIVE    Ketones, Urine NEGATIVE NEGATIVE mg/dL    Specific Gravity, UA 1.039 (H) 1.000 - 1.030    Urine Hgb NEGATIVE NEGATIVE    pH, Urine 5.0 5.0 - 8.0    Protein, UA NEGATIVE NEGATIVE mg/dL    Urobilinogen, Urine Normal 0.0 - 1.0 EU/dL    Nitrite, Urine NEGATIVE NEGATIVE    Leukocyte Esterase, Urine NEGATIVE NEGATIVE    Comment       Microscopic exam not performed based on chemical results unless requested in original order.   Basic Metabolic Panel w/ Reflex to MG    Collection Time: 10/22/24  5:18 AM   Result Value Ref Range    Sodium 142 136 - 145 mmol/L    Potassium 3.8 3.7 - 5.3 mmol/L    Chloride 107 98 - 107 mmol/L    CO2 10 (L) 20 - 31 mmol/L    Anion Gap 25 (H) 9 - 16 mmol/L    Glucose 105 (H) 74 - 99 mg/dL    BUN 14 8 - 23 mg/dL    Creatinine 0.7 0.7 - 1.2 mg/dL    Est, Glom Filt Rate >90 >60 mL/min/1.73m2    Calcium 8.2 (L) 8.6 - 10.4 mg/dL   CBC with auto differential    Collection Time: 10/22/24  5:18 AM   Result Value Ref Range    WBC 7.6 3.5 - 11.0 k/uL    RBC 3.88 (L) 4.0 - 5.2 m/uL    Hemoglobin 12.6 12.0 - 16.0 g/dL    Hematocrit 37.5 36 - 46 %    MCV 96.5 80 - 100 fL    MCH 32.4 26 - 34 pg    MCHC 33.6 31 - 37 g/dL    RDW 13.8 11.5 - 14.9  hypertension  History of diastolic congestive heart failure  Admitted with a speech deficit but did not have aphasia  noted abnormal speech although in emergency room patient said speech deficit started yesterday but to me she claims going on for more than a few days  Speech is back to normal  CT head neck did not show any new stroke no significant carotid artery stenosis  Patient already on Eliquis would add aspirin 81 mg increase Lipitor from 40 to 80 mL  Diastolic congestive heart failure on chest x-ray gentle diuresis 40 of Lasix cardiology consult  For atrial dilatation and diastolic congestive heart  Neurology input request    Consultations:   IP CONSULT TO NEUROLOGY  IP CONSULT TO SOCIAL WORK  IP CONSULT TO CARDIOLOGY     Patient is admitted as inpatient status because of co-morbidities listed above, severity of signs and symptoms as outlined, requirement for current medical therapies and most importantly because of direct risk to patient if care not provided in a hospital setting.  Expected length of stay > 48 hours.    Jm Epstein MD  10/22/2024  10:47 AM    Copy sent to Wood Reese MD    Please note that this chart was generated using voice recognition Dragon dictation software.  Although every effort was made to ensure the accuracy of this automated transcription, some errors in transcription may have occurred.

## 2024-10-22 NOTE — CARE COORDINATION
Case Management Assessment  Initial Evaluation    Date/Time of Evaluation: 10/22/2024 3:42 PM  Assessment Completed by: Preeti Fortune RN    If patient is discharged prior to next notation, then this note serves as note for discharge by case management.    Patient Name: Criselda Tinoco                   YOB: 1950  Diagnosis: TIA (transient ischemic attack) [G45.9]  Slurred speech [R47.81]                   Date / Time: 10/21/2024 10:51 PM    Patient Admission Status: Observation   Readmission Risk (Low < 19, Mod (19-27), High > 27): Readmission Risk Score: 20.2    Current PCP: Wood St MD  PCP verified by CM? No    Chart Reviewed: Yes      History Provided by: Patient  Patient Orientation: Alert and Oriented    Patient Cognition: Alert    Hospitalization in the last 30 days (Readmission):  No    If yes, Readmission Assessment in  Navigator will be completed.    Advance Directives:      Code Status: Full Code   Patient's Primary Decision Maker is: Legal Next of Kin    Primary Decision Maker: True Tinoco - Spouse - [phone]    Secondary Decision Maker: Lesly Clark - Child - [phone]    Discharge Planning:    Patient lives with: Spouse/Significant Other Type of Home: House  Primary Care Giver: Self  Patient Support Systems include: Friends/Neighbors, Family Members   Current Financial resources: Medicare  Current community resources: None  Current services prior to admission: Durable Medical Equipment            Current DME: Wheelchair            Type of Home Care services:  Aide Services    ADLS  Prior functional level: Independent in ADLs/IADLs  Current functional level: Independent in ADLs/IADLs    PT AM-PAC:   /24  OT AM-PAC:   /24    Family can provide assistance at DC: Yes  Would you like Case Management to discuss the discharge plan with any other family members/significant others, and if so, who? Yes  Plans to Return to Present Housing: Yes  Other Identified Issues/Barriers to

## 2024-10-22 NOTE — VIRTUAL HEALTH
Criselda Tinoco, was evaluated through a synchronous (real-time) audio-video encounter. The patient (and/or guardian if applicable) is aware that this is a billable service, which includes applicable co-pays. This virtual visit was conducted with patient's (and/or legal guardian's) consent. Patient identification was verified, and a caregiver was present when appropriate.  The patient was located at Facility (Appt Department): [facility] ED  [address] [address]  Loc: [phone]  The provider was located at Home (City/State): [place]  Confirm you are appropriately licensed, registered, or certified to deliver care in the state where the patient is located as indicated above. If you are not or unsure, please re-schedule the visit: Yes, I confirm.   Consults     Total time spent on this encounter:  70    --Oj Araujo MD on 10/21/2024 at 11:30 PM    An electronic signature was used to authenticate this note.      [facility] Stroke and Telestroke Consult for  [facility] Stroke Alert through [employer] @ 2304  10/21/2024 11:31 PM    Pt Name: Criselda Tinoco  MRN: [id number]  YOB: 1950  Date of evaluation: 10/21/2024  Primary Care Physician: Wood St MD  Reason for Evaluation: Stroke Evaluation with Discussion with Ed or primary team with Telemedicine and stroke evaluation with Review of imaging and labs    Criselda Tinoco is a 74 y.o. female who presents with aphasia (Hx CVA 2015 w/ L dominant HP/aphasia -> mostly resolved).  ~1630 felt sleepy (w/ daughter). Wasn't able to speak w/ walkie talkies. Unable to walk 5 yrs -> can't tell me why but discussed pain clinic, poor balance, unsuccessful procedures, etc.     Still unable to turn to the L, neck hurts, back hurts since C-spine surgery in July.  Fell in bathtub 10/20 & hit head/neck. Not speaking correctly for last few days.  Not clear why.      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  Social History  Social History     Socioeconomic History    Marital status:      Spouse name: Not on file    Number of children: Not on file    Years of education: Not on file    Highest education level: Not on file   Occupational History    Occupation: retired   Tobacco Use    Smoking status: Former     Current packs/day: 0.00     Average packs/day: 0.5 packs/day for 21.9 years (10.9 ttl pk-yrs)     Types: Cigarettes     Start date: 1995     Quit date: 2017     Years since quittin.3    Smokeless tobacco: Never   Vaping Use    Vaping status: Never Used   Substance and Sexual Activity    Alcohol use: No     Alcohol/week: 0.0 standard drinks of alcohol    Drug use: No    Sexual activity: Not on file   Other Topics Concern    Not on file   Social History Narrative    Not on file     Social Determinants of Health     Financial Resource Strain: Patient Declined (2024)    Overall Financial Resource Strain (CARDIA)     Difficulty of Paying Living Expenses: Patient declined   Food Insecurity: Patient Declined (2024)    Hunger Vital Sign     Worried About Running Out of Food in the Last Year: Patient declined     Ran Out of Food in the Last Year: Patient declined   Transportation Needs: Unknown (2024)    PRAPARE - Transportation     Lack of Transportation (Medical): No     Lack of Transportation (Non-Medical): Patient declined   Physical Activity: Inactive (2022)    Exercise Vital Sign     Days of Exercise per

## 2024-10-23 LAB
ANION GAP SERPL CALCULATED.3IONS-SCNC: 14 MMOL/L (ref 9–16)
BASOPHILS # BLD: 0.1 K/UL (ref 0–0.2)
BASOPHILS NFR BLD: 1 % (ref 0–2)
BUN SERPL-MCNC: 22 MG/DL (ref 8–23)
CALCIUM SERPL-MCNC: 8.4 MG/DL (ref 8.6–10.4)
CHLORIDE SERPL-SCNC: 106 MMOL/L (ref 98–107)
CO2 SERPL-SCNC: 21 MMOL/L (ref 20–31)
CREAT SERPL-MCNC: 0.9 MG/DL (ref 0.7–1.2)
EOSINOPHIL # BLD: 0.4 K/UL (ref 0–0.4)
EOSINOPHILS RELATIVE PERCENT: 5 % (ref 0–4)
ERYTHROCYTE [DISTWIDTH] IN BLOOD BY AUTOMATED COUNT: 13.9 % (ref 11.5–14.9)
GFR, ESTIMATED: 68 ML/MIN/1.73M2
GLUCOSE BLD-MCNC: 115 MG/DL (ref 65–105)
GLUCOSE BLD-MCNC: 129 MG/DL (ref 65–105)
GLUCOSE SERPL-MCNC: 134 MG/DL (ref 74–99)
HCT VFR BLD AUTO: 38.3 % (ref 36–46)
HGB BLD-MCNC: 12.7 G/DL (ref 12–16)
LYMPHOCYTES NFR BLD: 1.4 K/UL (ref 1–4.8)
LYMPHOCYTES RELATIVE PERCENT: 16 % (ref 24–44)
MCH RBC QN AUTO: 32.6 PG (ref 26–34)
MCHC RBC AUTO-ENTMCNC: 33.2 G/DL (ref 31–37)
MCV RBC AUTO: 98.3 FL (ref 80–100)
MONOCYTES NFR BLD: 0.7 K/UL (ref 0.1–1.3)
MONOCYTES NFR BLD: 8 % (ref 1–7)
NEUTROPHILS NFR BLD: 70 % (ref 36–66)
NEUTS SEG NFR BLD: 6 K/UL (ref 1.3–9.1)
PLATELET # BLD AUTO: 200 K/UL (ref 150–450)
PMV BLD AUTO: 7.7 FL (ref 6–12)
POTASSIUM SERPL-SCNC: 3.8 MMOL/L (ref 3.7–5.3)
RBC # BLD AUTO: 3.89 M/UL (ref 4–5.2)
SODIUM SERPL-SCNC: 141 MMOL/L (ref 136–145)
WBC OTHER # BLD: 8.5 K/UL (ref 3.5–11)

## 2024-10-23 PROCEDURE — 99232 SBSQ HOSP IP/OBS MODERATE 35: CPT | Performed by: INTERNAL MEDICINE

## 2024-10-23 PROCEDURE — 2580000003 HC RX 258: Performed by: NURSE PRACTITIONER

## 2024-10-23 PROCEDURE — 97110 THERAPEUTIC EXERCISES: CPT

## 2024-10-23 PROCEDURE — 6370000000 HC RX 637 (ALT 250 FOR IP): Performed by: NURSE PRACTITIONER

## 2024-10-23 PROCEDURE — 85025 COMPLETE CBC W/AUTO DIFF WBC: CPT

## 2024-10-23 PROCEDURE — 82947 ASSAY GLUCOSE BLOOD QUANT: CPT

## 2024-10-23 PROCEDURE — 6370000000 HC RX 637 (ALT 250 FOR IP): Performed by: INTERNAL MEDICINE

## 2024-10-23 PROCEDURE — 99232 SBSQ HOSP IP/OBS MODERATE 35: CPT | Performed by: PSYCHIATRY & NEUROLOGY

## 2024-10-23 PROCEDURE — 80048 BASIC METABOLIC PNL TOTAL CA: CPT

## 2024-10-23 PROCEDURE — 97530 THERAPEUTIC ACTIVITIES: CPT

## 2024-10-23 PROCEDURE — 36415 COLL VENOUS BLD VENIPUNCTURE: CPT

## 2024-10-23 PROCEDURE — G0378 HOSPITAL OBSERVATION PER HR: HCPCS

## 2024-10-23 PROCEDURE — 97166 OT EVAL MOD COMPLEX 45 MIN: CPT

## 2024-10-23 RX ORDER — ASPIRIN 81 MG/1
81 TABLET ORAL DAILY
Qty: 30 TABLET | Refills: 3 | Status: SHIPPED | OUTPATIENT
Start: 2024-10-24

## 2024-10-23 RX ORDER — BENZONATATE 100 MG/1
100 CAPSULE ORAL 3 TIMES DAILY PRN
Qty: 30 CAPSULE | Refills: 0 | Status: SHIPPED | OUTPATIENT
Start: 2024-10-23 | End: 2024-11-02

## 2024-10-23 RX ORDER — ATORVASTATIN CALCIUM 80 MG/1
80 TABLET, FILM COATED ORAL DAILY
Qty: 30 TABLET | Refills: 3 | Status: SHIPPED | OUTPATIENT
Start: 2024-10-24

## 2024-10-23 RX ADMIN — HYDRALAZINE HYDROCHLORIDE 25 MG: 25 TABLET ORAL at 08:50

## 2024-10-23 RX ADMIN — ANTI-FUNGAL POWDER MICONAZOLE NITRATE TALC FREE: 1.42 POWDER TOPICAL at 08:53

## 2024-10-23 RX ADMIN — GABAPENTIN 200 MG: 100 CAPSULE ORAL at 20:12

## 2024-10-23 RX ADMIN — APIXABAN 5 MG: 5 TABLET, FILM COATED ORAL at 08:50

## 2024-10-23 RX ADMIN — SERTRALINE 25 MG: 50 TABLET, FILM COATED ORAL at 08:50

## 2024-10-23 RX ADMIN — BENZONATATE 100 MG: 100 CAPSULE ORAL at 04:05

## 2024-10-23 RX ADMIN — BUSPIRONE HYDROCHLORIDE 5 MG: 5 TABLET ORAL at 20:12

## 2024-10-23 RX ADMIN — SERTRALINE 25 MG: 50 TABLET, FILM COATED ORAL at 20:11

## 2024-10-23 RX ADMIN — BACLOFEN 10 MG: 10 TABLET ORAL at 08:50

## 2024-10-23 RX ADMIN — BACLOFEN 10 MG: 10 TABLET ORAL at 20:12

## 2024-10-23 RX ADMIN — BUSPIRONE HYDROCHLORIDE 5 MG: 5 TABLET ORAL at 14:42

## 2024-10-23 RX ADMIN — AMLODIPINE BESYLATE 10 MG: 10 TABLET ORAL at 08:50

## 2024-10-23 RX ADMIN — ACETAMINOPHEN 650 MG: 325 TABLET ORAL at 01:14

## 2024-10-23 RX ADMIN — POLYETHYLENE GLYCOL 3350 17 G: 17 POWDER, FOR SOLUTION ORAL at 04:05

## 2024-10-23 RX ADMIN — SODIUM CHLORIDE, PRESERVATIVE FREE 10 ML: 5 INJECTION INTRAVENOUS at 08:55

## 2024-10-23 RX ADMIN — ATORVASTATIN CALCIUM 80 MG: 80 TABLET, FILM COATED ORAL at 08:53

## 2024-10-23 RX ADMIN — CARVEDILOL 25 MG: 25 TABLET, FILM COATED ORAL at 18:09

## 2024-10-23 RX ADMIN — APIXABAN 5 MG: 5 TABLET, FILM COATED ORAL at 20:11

## 2024-10-23 RX ADMIN — CARVEDILOL 25 MG: 25 TABLET, FILM COATED ORAL at 08:50

## 2024-10-23 RX ADMIN — HYDRALAZINE HYDROCHLORIDE 25 MG: 25 TABLET ORAL at 20:12

## 2024-10-23 RX ADMIN — POTASSIUM CHLORIDE 20 MEQ: 750 CAPSULE, EXTENDED RELEASE ORAL at 08:52

## 2024-10-23 RX ADMIN — BENZONATATE 100 MG: 100 CAPSULE ORAL at 12:37

## 2024-10-23 RX ADMIN — ASPIRIN 81 MG: 81 TABLET, COATED ORAL at 08:53

## 2024-10-23 RX ADMIN — GABAPENTIN 200 MG: 100 CAPSULE ORAL at 08:52

## 2024-10-23 RX ADMIN — BUSPIRONE HYDROCHLORIDE 5 MG: 5 TABLET ORAL at 08:53

## 2024-10-23 RX ADMIN — FUROSEMIDE 40 MG: 40 TABLET ORAL at 08:49

## 2024-10-23 RX ADMIN — SODIUM CHLORIDE, PRESERVATIVE FREE 10 ML: 5 INJECTION INTRAVENOUS at 20:12

## 2024-10-23 RX ADMIN — SERTRALINE 25 MG: 50 TABLET, FILM COATED ORAL at 14:42

## 2024-10-23 ASSESSMENT — PAIN DESCRIPTION - LOCATION
LOCATION: BACK
LOCATION: BACK

## 2024-10-23 ASSESSMENT — PAIN SCALES - GENERAL
PAINLEVEL_OUTOF10: 0
PAINLEVEL_OUTOF10: 5
PAINLEVEL_OUTOF10: 5
PAINLEVEL_OUTOF10: 0

## 2024-10-23 ASSESSMENT — PAIN DESCRIPTION - DESCRIPTORS
DESCRIPTORS: ACHING;SORE;DISCOMFORT
DESCRIPTORS: SORE;DISCOMFORT

## 2024-10-23 ASSESSMENT — PAIN DESCRIPTION - ORIENTATION
ORIENTATION: LOWER
ORIENTATION: POSTERIOR;LOWER

## 2024-10-23 ASSESSMENT — PAIN - FUNCTIONAL ASSESSMENT
PAIN_FUNCTIONAL_ASSESSMENT: ACTIVITIES ARE NOT PREVENTED
PAIN_FUNCTIONAL_ASSESSMENT: ACTIVITIES ARE NOT PREVENTED

## 2024-10-23 ASSESSMENT — PAIN SCALES - WONG BAKER: WONGBAKER_NUMERICALRESPONSE: NO HURT

## 2024-10-23 NOTE — PROGRESS NOTES
10/23/24 1750   Encounter Summary   Encounter Overview/Reason Spiritual/Emotional Needs   Service Provided For Patient not available  (PT on the phone)

## 2024-10-23 NOTE — PROGRESS NOTES
CLINICAL PHARMACY NOTE: MEDS TO BEDS    Total # of Prescriptions Filled: 2   The following medications were delivered to the patient:  Atorvastatin 80mg  Benzonatate 100mg    Additional Documentation:  Medications picked up at pharmacy

## 2024-10-23 NOTE — PROGRESS NOTES
intravenous  contrast. Automated exposure control, iterative reconstruction, and/or weight  based adjustment of the mA/kV was utilized to reduce the radiation dose to as  low as reasonably achievable.    COMPARISON:  MR brain March 9, 2024 and CT head March 9, 2024    HISTORY:  ORDERING SYSTEM PROVIDED HISTORY: Stroke  TECHNOLOGIST PROVIDED HISTORY:    Stroke  Decision Support Exception - unselect if not a suspected or confirmed  emergency medical condition->Emergency Medical Condition (MA)  Reason for Exam: Stroke, Confusion    FINDINGS:  BRAIN/VENTRICLES: There is no acute intracranial hemorrhage, mass effect or  midline shift.  No abnormal extra-axial fluid collection.  The gray-white  differentiation is maintained without evidence of an acute infarct.  There is  no evidence of hydrocephalus. Mild cortical atrophy.  Intracranial  atherosclerosis.    ORBITS: The visualized portion of the orbits demonstrate no acute abnormality.    SINUSES: The visualized paranasal sinuses and mastoid air cells demonstrate  no acute abnormality.  Bilateral median antrectomies.  Within the right  maxillary sinus.  DJD right TMJ.    SOFT TISSUES/SKULL:  No acute abnormality of the visualized skull or soft  tissues.    Impression  No acute intracranial abnormality.      I personally reviewed all of the above medications, clinical laboratory, imaging and other diagnostic tests.     Impression:       74 year old female with history of cervical myelopathy with residual left-sided weakness, CVA with mild residual aphasia and L facial droop presented with slurred speech- patient appears at baseline exam  HCT and CTA head and neck unrevealing  2. Co-morbid conditions of cervical decompression, neck and back pain, on AOC for DVT's     Plan:      MRI Brain showed no signs of acute CVA  EEG was normal   PT/OT/ST  Continue Eliquis and statin  Hgba1c, lipid panel  Discussed with RN   Plan for DC       This note is created with the assistance of  a speech-recognition program. While intending to generate a document that actually reflects the content of the visit, the document can still have some errors including those of syntax and sound a- like substitutions which may escape proofreading.  In such instances, actual meaning can be extrapolated by contextual derivation.

## 2024-10-23 NOTE — PROGRESS NOTES
Date:   10/23/2024  Patient name: Criselda Tinoco  Date of admission:  10/21/2024 10:51 PM  MRN:   [id number]  YOB: 1950  PCP: Wood tS MD    Reason for Admission: TIA (transient ischemic attack) [G45.9]  Slurred speech [R47.81]    Cardiology follow-up       Referring physician: Dr Jm Epstein     Impression     Admission with TIA, sudden onset speech disturbance  Previous history of CVA, left-sided weakness, residual aphasia  Congestive heart failure diastolic  Diabetes mellitus type 2  History of hypertension  History of hyperlipidemia  History of DVT on Eliquis  Very poor mobility since cervical spine fusion surgery 7/26/2023, gait instability, left-sided weakness  Admission 5/31/2024 for chest pain like pressure, normal ECG and normal high-sensitivity troponin     Past past surgical history  Abdominal surgery, appendectomy, oophorectomy, cardiac cath no stent cervical fusion, hysterectomy, EGD and colonoscopies  Drug allergy Fentanyl, Bactrim, diazepam    History of present illness     74-year-old  female with a past medical history of diabetes mellitus type 2, COPD, history of DVT, chronically anticoagulated on Eliquis, history of cerebral infarction with a chronic left-sided weakness presented to the emergency room with aphasia.  According to the patient she was at home earlier this evening when her  noticed a change in her speech and called 911.  Patient stated that her daughter visited at 4:30 PM and she was fine at that time however about 5:30 PM she began talking goofy.  She mentions she fell in her bathtub a day before admission and hit the back of her head.  She denied losing consciousness and she did not go to the emergency department.  She said that she was at [facility] for 9 months for rehabilitation therapy and has only been home for the last 3 months.  Stroke alert was called prior to the arrival.  CT head shows no acute intracranial abnormality.  He CTA of  disease     Spondylosis of lumbar region without myelopathy or radiculopathy     Blood poisoning     Cellulitis of left lower extremity     Encounter for medication monitoring     Deep vein thrombosis (DVT) of right lower extremity (HCC)     Chronic deep vein thrombosis (DVT) of proximal vein of both lower extremities (HCC)     Chronic diastolic heart failure (HCC)     Neurogenic claudication due to lumbar spinal stenosis     Type 2 diabetes mellitus with circulatory disorder, without long-term current use of insulin (HCC)     Chronic deep vein thrombosis (DVT) of popliteal vein of right lower extremity (HCC)     Closed fracture of left wrist     B12 deficiency     Iron deficiency anemia secondary to inadequate dietary iron intake     Closed head injury     Scalp laceration     Abnormal finding on imaging     Other irritable bowel syndrome     Frequent falls     Double vision     Muscle soreness     Peptic ulcer     Melena     PUD (peptic ulcer disease)     Absolute anemia     Acute postoperative anemia due to expected blood loss     Acute renal failure (HCC)     Clostridium difficile infection     Acquired spondylolisthesis     Spinal stenosis of lumbar region with neurogenic claudication     Acute deep vein thrombosis (DVT) of proximal vein of left lower extremity (HCC)     Leg swelling     Back pain     Neurological disorder     Closed unstable burst fracture of fifth lumbar vertebra with routine healing     Slow transit constipation     Acute deep vein thrombosis (DVT) of femoral vein of left lower extremity (HCC)     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

## 2024-10-23 NOTE — PLAN OF CARE
Problem: Chronic Conditions and Co-morbidities  Goal: Patient's chronic conditions and co-morbidity symptoms are monitored and maintained or improved  Outcome: Progressing  Flowsheets (Taken 10/23/2024 0800)  Care Plan - Patient's Chronic Conditions and Co-Morbidity Symptoms are Monitored and Maintained or Improved:   Monitor and assess patient's chronic conditions and comorbid symptoms for stability, deterioration, or improvement   Collaborate with multidisciplinary team to address chronic and comorbid conditions and prevent exacerbation or deterioration   Update acute care plan with appropriate goals if chronic or comorbid symptoms are exacerbated and prevent overall improvement and discharge     Problem: Discharge Planning  Goal: Discharge to home or other facility with appropriate resources  Outcome: Progressing  Flowsheets (Taken 10/23/2024 0800)  Discharge to home or other facility with appropriate resources:   Identify barriers to discharge with patient and caregiver   Arrange for needed discharge resources and transportation as appropriate   Identify discharge learning needs (meds, wound care, etc)     Problem: Pain  Goal: Verbalizes/displays adequate comfort level or baseline comfort level  Outcome: Progressing  Flowsheets (Taken 10/22/2024 1201)  Verbalizes/displays adequate comfort level or baseline comfort level:   Encourage patient to monitor pain and request assistance   Assess pain using appropriate pain scale   Administer analgesics based on type and severity of pain and evaluate response     Problem: Skin/Tissue Integrity  Goal: Absence of new skin breakdown  Description: 1.  Monitor for areas of redness and/or skin breakdown  2.  Assess vascular access sites hourly  3.  Every 4-6 hours minimum:  Change oxygen saturation probe site  4.  Every 4-6 hours:  If on nasal continuous positive airway pressure, respiratory therapy assess nares and determine need for appliance change or resting

## 2024-10-23 NOTE — PROGRESS NOTES
[facility]   Occupational Therapy Evaluation  Date: 10/23/24  Patient Name: Criselda Tinoco       Room: 5-  MRN: [id number]  Account: [id number]   : 1950  (74 y.o.) Gender: female     Discharge Recommendations:  Further Occupational Therapy is recommended upon facility discharge.    OT Equipment Recommendations  Other: TBD       Diagnosis: TIA        Treatment Diagnosis: Impaired self care status    Past Medical History:  has a past medical history of Allergic rhinitis, cause unspecified, Back pain, Bowel obstruction ([facility]), C. difficile diarrhea, CAD (coronary artery disease), Cardiac murmur, Cellulitis, Cerebral artery occlusion with cerebral infarction (HCC), COVID-19, Diverticulosis of colon (without mention of hemorrhage), GERD (gastroesophageal reflux disease), History of blood transfusion, History of CHF (congestive heart failure), History of MI (myocardial infarction), History of ovarian cyst, History of peritonitis, HTN (hypertension), Hx of blood clots, Hyperlipidemia, Intestinal or peritoneal adhesions with obstruction (postoperative) (postinfection) (HCC), Kidney infection, Lateral epicondylitis  of elbow, MDRO (multiple drug resistant organisms) resistance, Muscle strain, Other abnormal glucose, PONV (postoperative nausea and vomiting), Pre-diabetes, Restless legs syndrome (RLS), Snores, 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    Past Surgical History:   has a past surgical history that includes Ovary removal (); colectomy (); Appendectomy (); Ovary removal (); Hysterectomy (); Bunionectomy (Left); sinus surgery (); Colonoscopy; other surgical history (2014); cyst removal (Right); Wrist fracture surgery (Left, 2019); Upper gastrointestinal endoscopy  Therapy, Plan of Care, ADL Adaptive Strategies, Transfer Training  Education Method: Verbal  Education Outcome: Verbalized understanding, Demonstrated understanding, Continued education needed      Functional Outcome Measures  AM-PAC Daily Activity - Inpatient   How much help is needed for putting on and taking off regular lower body clothing?: Total  How much help is needed for bathing (which includes washing, rinsing, drying)?: Total  How much help is needed for toileting (which includes using toilet, bedpan, or urinal)?: Total  How much help is needed for putting on and taking off regular upper body clothing?: A Little  How much help is needed for taking care of personal grooming?: A Little  How much help for eating meals?: A Little  AM-[facility] Inpatient Daily Activity Raw Score: 12  AM-PAC Inpatient ADL T-Scale Score : 30.6  ADL Inpatient CMS 0-100% Score: 66.57  ADL Inpatient CMS G-Code Modifier : CL       Goals     Short Term Goals  Time Frame for Short Term Goals: By discharge  Short Term Goal 1: Pt will complete lower body dressing/bathing with Mod A and Good safety with use of AE as needed  Short Term Goal 2: Pt will complete functional transfers/mobility during self care tasks with Mod A and Good safety with use of least restrictive device  Short Term Goal 3: Pt will tolerate standing 3+ minutes during functional activity of choice with Min A and Good safety  Short Term Goal 4: Pt will verbalize/demonstrate Good understanding of AE/adaptive strategies/DME to increase safety and independence with self care and mobility  Short Term Goal 5: Pt will participate in 15+ minutes of therapeutic exercises/functional activities to increase safety and independence with self care and mobility    Plan  Occupational Therapy Plan  Times Per Week: 5-7  Current Treatment Recommendations: Self-Care / ADL, Strengthening, ROM, Balance training, Functional mobility training, Endurance training, Wheelchair mobility training,

## 2024-10-23 NOTE — PROGRESS NOTES
Physical Therapy  Facility/Department: [facility]    Name: Criselda Tinoco  : 1950  MRN: [id number]  Date of Service: 10/23/2024    Discharge Recommendations:  Continue to assess pending progress, Patient would benefit from continued therapy after discharge, Therapy recommended at discharge, 24 hour supervision or assist   PT Equipment Recommendations  Equipment Needed:  (TBD)      Patient Diagnosis(es): The encounter diagnosis was Slurred speech.  Past Medical History:  has a past medical history of Allergic rhinitis, cause unspecified, Back pain, Bowel obstruction (HCC), C. difficile diarrhea, CAD (coronary artery disease), Cardiac murmur, Cellulitis, Cerebral artery occlusion with cerebral infarction (HCC), COVID-19, Diverticulosis of colon (without mention of hemorrhage), GERD (gastroesophageal reflux disease), History of blood transfusion, History of CHF (congestive heart failure), History of MI (myocardial infarction), History of ovarian cyst, History of peritonitis, HTN (hypertension), Hx of blood clots, Hyperlipidemia, Intestinal or peritoneal adhesions with obstruction (postoperative) (postinfection) (HCC), Kidney infection, Lateral epicondylitis  of elbow, MDRO (multiple drug resistant organisms) resistance, Muscle strain, Other abnormal glucose, PONV (postoperative nausea and vomiting), Pre-diabetes, Restless legs syndrome (RLS), Snores, 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  Past Surgical History:  has a past surgical history that includes Ovary removal (); colectomy (); Appendectomy (); Ovary removal (); Hysterectomy (); Bunionectomy (Left); sinus surgery (); Colonoscopy; other surgical history (2014); cyst removal (Right); Wrist fracture surgery (Left, 2019); Upper  gastrointestinal endoscopy (N/A, 05/31/2019); Upper gastrointestinal endoscopy (N/A, 08/05/2019); Upper gastrointestinal endoscopy (N/A, 08/23/2019); Abdomen surgery (1976); lumbar fusion (N/A, 02/10/2020); Cardiac catheterization (2005); [place] tooth extraction; lumbar fusion (N/A, 06/17/2020); back surgery; cervical fusion (N/A, 05/21/2021); Finger Closed Reduction (Left, 08/24/2022); cervical fusion (N/A, 7/26/2023); and cervical fusion (N/A, 7/26/2023).    Assessment  History: CVA; cervical/lumbar spondylosis  Requires PT Follow-Up: Yes  Activity Tolerance  Activity Tolerance: Patient tolerated treatment well    Plan  Physical Therapy Plan  General Plan: 1 time a day 7 days a week  Current Treatment Recommendations: Strengthening, Balance training, Functional mobility training, Transfer training, Endurance training, Therapeutic activities, Safety education & training  Safety Devices  Type of Devices: Call light within reach, Bed alarm in place, Patient at risk for falls, Gait belt, Chair alarm in place, Left in chair    Restrictions  Restrictions/Precautions  Restrictions/Precautions: Fall Risk, General Precautions     Subjective  Pain: Pt reports pain in neck and shoulders d/t arthritus  General  Chart Reviewed: Yes  Response To Previous Treatment: Patient with no complaints from previous session.  Family / Caregiver Present: No  General Comment  Comments: RN approved therapy  Subjective  Subjective: Pt lying in bed upon arrival, agreeable to therapy    Cognition   Orientation  Overall Orientation Status: Within Functional Limits    Objective  Bed mobility  Supine to Sit: Maximum assistance  Sit to Supine: Unable to assess  Scooting: Moderate assistance  Transfers  Sit to Stand: Contact guard assistance  Stand to Sit: Contact guard assistance  Comment: performed with SS           Exercise Treatment: bed mobility x1  A/AROM Exercises: seated EOB B LE exercises x10 reps  Pressure Relief Exercises: seated EOB ~ 10

## 2024-10-23 NOTE — DISCHARGE SUMMARY
IN-PATIENT SERVICE   [facility]    Discharge Summary     Patient ID: Criselda Tinoco  :  1950   MRN: [id number]     ACCOUNT:  [id number]   Patient's PCP: Wood St MD  Admit Date: 10/21/2024   Discharge Date: 10/23/2024    Length of Stay: 0  Code Status:  Full Code  Admitting Physician: Jm Epstein MD  Discharge Physician: Jm Epstein MD     Active Discharge Diagnoses:     Primary Problem  TIA (transient ischemic attack)      Hospital Problems  Active Hospital Problems    Diagnosis Date Noted    Slurred speech [R47.81] 10/22/2024    TIA (transient ischemic attack) [G45.9] 2015       Admission Condition:  fair     Discharged Condition: fair    Hospital Stay:     Hospital Course:  Criselda Tinoco is a 74 y.o. female who was admitted for the management of TIA (transient ischemic attack) , presented to ER with Aphasia  74-year-old  lady with history of cervical myelopathy history of cervical decompression surgeries history of stroke with a left-sided weakness  Brought in as a stroke alert patient's family noted some slurred speech no recent injuries no new weakness  Symptom resolved within few hours  Patient already on Eliquis  MRI brain was nil acute EEG was negative for any seizures Lipitor increased to 80 mg continued Eliquis aspirin 81 mg added  Echocardiogram was done with bubble study negative for any intracardiac shunt  Patient is back to baseline neurology consult and cardiology input was requested input noted and appreciated discharge back to family care today      Significant therapeutic interventions:     Significant Diagnostic Studies:   Labs / Micro:        Radiology:    MRI BRAIN WO CONTRAST    Result Date: 10/22/2024  EXAMINATION: MRI OF THE BRAIN WITHOUT CONTRAST  10/22/2024 6:21 pm TECHNIQUE: Multiplanar multisequence MRI of the brain was performed without the administration of intravenous contrast. COMPARISON: None. HISTORY:

## 2024-10-24 ENCOUNTER — APPOINTMENT (OUTPATIENT)
Dept: GENERAL RADIOLOGY | Age: 74
End: 2024-10-24
Payer: MEDICARE

## 2024-10-24 ENCOUNTER — TELEPHONE (OUTPATIENT)
Dept: INTERNAL MEDICINE CLINIC | Age: 74
End: 2024-10-24

## 2024-10-24 LAB
ANION GAP SERPL CALCULATED.3IONS-SCNC: 11 MMOL/L (ref 9–16)
BASOPHILS # BLD: 0 K/UL (ref 0–0.2)
BASOPHILS NFR BLD: 1 % (ref 0–2)
BUN SERPL-MCNC: 26 MG/DL (ref 8–23)
CALCIUM SERPL-MCNC: 8.2 MG/DL (ref 8.6–10.4)
CHLORIDE SERPL-SCNC: 108 MMOL/L (ref 98–107)
CO2 SERPL-SCNC: 21 MMOL/L (ref 20–31)
CREAT SERPL-MCNC: 0.8 MG/DL (ref 0.7–1.2)
EOSINOPHIL # BLD: 0.6 K/UL (ref 0–0.4)
EOSINOPHILS RELATIVE PERCENT: 7 % (ref 0–4)
ERYTHROCYTE [DISTWIDTH] IN BLOOD BY AUTOMATED COUNT: 13.9 % (ref 11.5–14.9)
GFR, ESTIMATED: 78 ML/MIN/1.73M2
GLUCOSE SERPL-MCNC: 116 MG/DL (ref 74–99)
HCT VFR BLD AUTO: 37.8 % (ref 36–46)
HGB BLD-MCNC: 12.5 G/DL (ref 12–16)
LYMPHOCYTES NFR BLD: 2.1 K/UL (ref 1–4.8)
LYMPHOCYTES RELATIVE PERCENT: 26 % (ref 24–44)
MCH RBC QN AUTO: 32.3 PG (ref 26–34)
MCHC RBC AUTO-ENTMCNC: 33 G/DL (ref 31–37)
MCV RBC AUTO: 97.9 FL (ref 80–100)
MONOCYTES NFR BLD: 0.7 K/UL (ref 0.1–1.3)
MONOCYTES NFR BLD: 9 % (ref 1–7)
NEUTROPHILS NFR BLD: 57 % (ref 36–66)
NEUTS SEG NFR BLD: 4.6 K/UL (ref 1.3–9.1)
PLATELET # BLD AUTO: 192 K/UL (ref 150–450)
PMV BLD AUTO: 7.6 FL (ref 6–12)
POTASSIUM SERPL-SCNC: 4 MMOL/L (ref 3.7–5.3)
RBC # BLD AUTO: 3.86 M/UL (ref 4–5.2)
SODIUM SERPL-SCNC: 140 MMOL/L (ref 136–145)
WBC OTHER # BLD: 8.1 K/UL (ref 3.5–11)

## 2024-10-24 PROCEDURE — G0378 HOSPITAL OBSERVATION PER HR: HCPCS

## 2024-10-24 PROCEDURE — 6370000000 HC RX 637 (ALT 250 FOR IP): Performed by: INTERNAL MEDICINE

## 2024-10-24 PROCEDURE — 74230 X-RAY XM SWLNG FUNCJ C+: CPT

## 2024-10-24 PROCEDURE — 92611 MOTION FLUOROSCOPY/SWALLOW: CPT

## 2024-10-24 PROCEDURE — 97110 THERAPEUTIC EXERCISES: CPT

## 2024-10-24 PROCEDURE — 99232 SBSQ HOSP IP/OBS MODERATE 35: CPT | Performed by: INTERNAL MEDICINE

## 2024-10-24 PROCEDURE — 97530 THERAPEUTIC ACTIVITIES: CPT

## 2024-10-24 PROCEDURE — 6370000000 HC RX 637 (ALT 250 FOR IP): Performed by: NURSE PRACTITIONER

## 2024-10-24 PROCEDURE — 2580000003 HC RX 258: Performed by: NURSE PRACTITIONER

## 2024-10-24 PROCEDURE — 36415 COLL VENOUS BLD VENIPUNCTURE: CPT

## 2024-10-24 PROCEDURE — 80048 BASIC METABOLIC PNL TOTAL CA: CPT

## 2024-10-24 PROCEDURE — 85025 COMPLETE CBC W/AUTO DIFF WBC: CPT

## 2024-10-24 RX ADMIN — BACLOFEN 10 MG: 10 TABLET ORAL at 08:34

## 2024-10-24 RX ADMIN — BUSPIRONE HYDROCHLORIDE 5 MG: 5 TABLET ORAL at 08:34

## 2024-10-24 RX ADMIN — AMLODIPINE BESYLATE 10 MG: 10 TABLET ORAL at 08:26

## 2024-10-24 RX ADMIN — SERTRALINE 25 MG: 50 TABLET, FILM COATED ORAL at 08:26

## 2024-10-24 RX ADMIN — ATORVASTATIN CALCIUM 80 MG: 80 TABLET, FILM COATED ORAL at 08:27

## 2024-10-24 RX ADMIN — BENZONATATE 100 MG: 100 CAPSULE ORAL at 16:04

## 2024-10-24 RX ADMIN — ASPIRIN 81 MG: 81 TABLET, COATED ORAL at 08:27

## 2024-10-24 RX ADMIN — BUSPIRONE HYDROCHLORIDE 5 MG: 5 TABLET ORAL at 20:43

## 2024-10-24 RX ADMIN — POTASSIUM CHLORIDE 20 MEQ: 750 CAPSULE, EXTENDED RELEASE ORAL at 08:26

## 2024-10-24 RX ADMIN — SERTRALINE 25 MG: 50 TABLET, FILM COATED ORAL at 20:43

## 2024-10-24 RX ADMIN — APIXABAN 5 MG: 5 TABLET, FILM COATED ORAL at 08:26

## 2024-10-24 RX ADMIN — SODIUM CHLORIDE, PRESERVATIVE FREE 10 ML: 5 INJECTION INTRAVENOUS at 08:31

## 2024-10-24 RX ADMIN — HYDRALAZINE HYDROCHLORIDE 25 MG: 25 TABLET ORAL at 20:43

## 2024-10-24 RX ADMIN — SERTRALINE 25 MG: 50 TABLET, FILM COATED ORAL at 15:59

## 2024-10-24 RX ADMIN — BACLOFEN 10 MG: 10 TABLET ORAL at 20:43

## 2024-10-24 RX ADMIN — HYDRALAZINE HYDROCHLORIDE 25 MG: 25 TABLET ORAL at 15:59

## 2024-10-24 RX ADMIN — POLYETHYLENE GLYCOL 3350 17 G: 17 POWDER, FOR SOLUTION ORAL at 10:56

## 2024-10-24 RX ADMIN — GABAPENTIN 200 MG: 100 CAPSULE ORAL at 20:43

## 2024-10-24 RX ADMIN — ACETAMINOPHEN 650 MG: 325 TABLET ORAL at 05:09

## 2024-10-24 RX ADMIN — ACETAMINOPHEN 650 MG: 325 TABLET ORAL at 15:59

## 2024-10-24 RX ADMIN — CARVEDILOL 25 MG: 25 TABLET, FILM COATED ORAL at 08:27

## 2024-10-24 RX ADMIN — APIXABAN 5 MG: 5 TABLET, FILM COATED ORAL at 20:43

## 2024-10-24 RX ADMIN — SODIUM CHLORIDE, PRESERVATIVE FREE 10 ML: 5 INJECTION INTRAVENOUS at 20:44

## 2024-10-24 RX ADMIN — HYDRALAZINE HYDROCHLORIDE 25 MG: 25 TABLET ORAL at 08:26

## 2024-10-24 RX ADMIN — ANTI-FUNGAL POWDER MICONAZOLE NITRATE TALC FREE: 1.42 POWDER TOPICAL at 04:38

## 2024-10-24 RX ADMIN — ANTI-FUNGAL POWDER MICONAZOLE NITRATE TALC FREE: 1.42 POWDER TOPICAL at 08:55

## 2024-10-24 RX ADMIN — GABAPENTIN 200 MG: 100 CAPSULE ORAL at 08:26

## 2024-10-24 RX ADMIN — BUSPIRONE HYDROCHLORIDE 5 MG: 5 TABLET ORAL at 15:59

## 2024-10-24 RX ADMIN — CARVEDILOL 25 MG: 25 TABLET, FILM COATED ORAL at 16:04

## 2024-10-24 RX ADMIN — Medication 3 MG: at 20:43

## 2024-10-24 RX ADMIN — FUROSEMIDE 40 MG: 40 TABLET ORAL at 08:26

## 2024-10-24 RX ADMIN — ANTI-FUNGAL POWDER MICONAZOLE NITRATE TALC FREE: 1.42 POWDER TOPICAL at 20:46

## 2024-10-24 ASSESSMENT — PAIN DESCRIPTION - DESCRIPTORS
DESCRIPTORS: ACHING

## 2024-10-24 ASSESSMENT — PAIN SCALES - GENERAL: PAINLEVEL_OUTOF10: 5

## 2024-10-24 ASSESSMENT — PAIN DESCRIPTION - LOCATION
LOCATION: NECK;ARM
LOCATION: HEAD
LOCATION: SHOULDER;NECK

## 2024-10-24 ASSESSMENT — PAIN DESCRIPTION - ORIENTATION
ORIENTATION: LEFT
ORIENTATION: LEFT

## 2024-10-24 NOTE — PROGRESS NOTES
reactive, extraocular eye movements intact, conjunctiva clear  Ear: normal external ear, no discharge, hearing intact  Nose: no drainage noted  Mouth: mucous membranes moist  Neck: supple, no carotid bruits, thyroid not palpable  Lungs: Bilateral equal air entry, clear to ausculation, no wheezing, rales or rhonchi, normal effort  Cardiovascular: normal rate, regular rhythm, no murmur, gallop, rub  Abdomen: Soft, nontender, nondistended, normal bowel sounds, no hepatomegaly or splenomegaly  Neurologic: There are no new focal motor or sensory deficits, normal muscle tone and bulk, no abnormal sensation, normal speech, cranial nerves II through XII grossly intact  Skin: No gross lesions, rashes, bruising or bleeding on exposed skin area  Extremities: peripheral pulses palpable, no pedal edema or calf pain with palpation  Psych: normal affect    Investigations:      Laboratory Testing:  Recent Results (from the past 24 hour(s))   POC Glucose Fingerstick    Collection Time: 10/23/24 11:18 AM   Result Value Ref Range    POC Glucose 115 (H) 65 - 105 mg/dL   Basic Metabolic Panel w/ Reflex to MG    Collection Time: 10/24/24  5:30 AM   Result Value Ref Range    Sodium 140 136 - 145 mmol/L    Potassium 4.0 3.7 - 5.3 mmol/L    Chloride 108 (H) 98 - 107 mmol/L    CO2 21 20 - 31 mmol/L    Anion Gap 11 9 - 16 mmol/L    Glucose 116 (H) 74 - 99 mg/dL    BUN 26 (H) 8 - 23 mg/dL    Creatinine 0.8 0.7 - 1.2 mg/dL    Est, Glom Filt Rate 78 >60 mL/min/1.73m2    Calcium 8.2 (L) 8.6 - 10.4 mg/dL   CBC with auto differential    Collection Time: 10/24/24  5:30 AM   Result Value Ref Range    WBC 8.1 3.5 - 11.0 k/uL    RBC 3.86 (L) 4.0 - 5.2 m/uL    Hemoglobin 12.5 12.0 - 16.0 g/dL    Hematocrit 37.8 36 - 46 %    MCV 97.9 80 - 100 fL    MCH 32.3 26 - 34 pg    MCHC 33.0 31 - 37 g/dL    RDW 13.9 11.5 - 14.9 %    Platelets 192 150 - 450 k/uL    MPV 7.6 6.0 - 12.0 fL    Neutrophils % 57 36 - 66 %    Lymphocytes % 26 24 - 44 %    Monocytes % 9 (H)  aphasia  noted abnormal speech although in emergency room patient said speech deficit started yesterday but to me she claims going on for more than a few days  Speech is back to normal  CT head neck did not show any new stroke no significant carotid artery stenosis  Patient already on Eliquis would add aspirin 81 mg increase Lipitor from 40 to 80 mL  Diastolic congestive heart failure on chest x-ray gentle diuresis 40 of Lasix cardiology consult  For atrial dilatation and diastolic congestive heart  Neurology input request input noted  Eeg negative  Ct brain no new cva  Unable to get mri   decided he can't take care of her at home  Hence needing ECF,family has not chosen a place  Pending placement   Ok to dc when placement is found    Consultations:   IP CONSULT TO NEUROLOGY  IP CONSULT TO SOCIAL WORK  IP CONSULT TO CARDIOLOGY     Patient is admitted as inpatient status because of co-morbidities listed above, severity of signs and symptoms as outlined, requirement for current medical therapies and most importantly because of direct risk to patient if care not provided in a hospital setting.  Expected length of stay > 48 hours.    Jm Epstein MD  10/24/2024  8:42 AM    Copy sent to Wood Reese MD    Please note that this chart was generated using voice recognition Dragon dictation software.  Although every effort was made to ensure the accuracy of this automated transcription, some errors in transcription may have occurred.

## 2024-10-24 NOTE — PROGRESS NOTES
1 - 7 %    Eosinophils % 7 (H) 0 - 4 %    Basophils % 1 0 - 2 %    Neutrophils Absolute 4.60 1.3 - 9.1 k/uL    Lymphocytes Absolute 2.10 1.0 - 4.8 k/uL    Monocytes Absolute 0.70 0.1 - 1.3 k/uL    Eosinophils Absolute 0.60 (H) 0.0 - 0.4 k/uL    Basophils Absolute 0.00 0.0 - 0.2 k/uL       Imaging/Diagnostics:  CT HEAD WO CONTRAST    Addendum Date: 10/22/2024    ADDENDUM: Results relayed to Dr. Taiwo garland set 11:29 p.m.     Result Date: 10/22/2024  No acute intracranial abnormality.     CTA HEAD NECK W CONTRAST    Result Date: 10/21/2024  1. No acute arterial abnormality or hemodynamically significant arterial stenosis in the head or neck. 2. No acute fracture or traumatic malalignment of the cervical spine. 3. Changes of prior C3 through C6 laminectomies and anterior C6-7 and posterior C2-T2 surgical fusion in near anatomic alignment.  No evidence of hardware failure or loosening.     CT CSpine W/O Contrast    Result Date: 10/21/2024  1. No acute arterial abnormality or hemodynamically significant arterial stenosis in the head or neck. 2. No acute fracture or traumatic malalignment of the cervical spine. 3. Changes of prior C3 through C6 laminectomies and anterior C6-7 and posterior C2-T2 surgical fusion in near anatomic alignment.  No evidence of hardware failure or loosening.     XR CHEST PORTABLE    Result Date: 10/21/2024  1. Cardiomegaly with mild pulmonary vascular congestion. 2. Possible small left-sided pleural effusion.       Assessment :      Hospital Problems             Last Modified POA    * (Principal) TIA (transient ischemic attack) 10/22/2024 Yes    Slurred speech 10/22/2024 Yes       Plan:     Patient status inpatient in the Progressive Unit/Step down  74-year-old lady with history of ischemic stroke with the left hemiparesis on oral anticoagulation with Eliquis history of hyperlipidemia hypertension  History of diastolic congestive heart failure  Admitted with a speech deficit but did not have  aphasia  noted abnormal speech although in emergency room patient said speech deficit started yesterday but to me she claims going on for more than a few days  Speech is back to normal  CT head neck did not show any new stroke no significant carotid artery stenosis  Patient already on Eliquis would add aspirin 81 mg increase Lipitor from 40 to 80 mL  Diastolic congestive heart failure on chest x-ray gentle diuresis 40 of Lasix cardiology consult  For atrial dilatation and diastolic congestive heart  Neurology input request input noted  Eeg negative  Ct brain no new cva  Unable to get mri   decided he can't take care of her at home  Hence needing ECF,family has not chosen a place      Consultations:   IP CONSULT TO NEUROLOGY  IP CONSULT TO SOCIAL WORK  IP CONSULT TO CARDIOLOGY     Patient is admitted as inpatient status because of co-morbidities listed above, severity of signs and symptoms as outlined, requirement for current medical therapies and most importantly because of direct risk to patient if care not provided in a hospital setting.  Expected length of stay > 48 hours.    Jm Epstein MD  10/24/2024  8:39 AM    Copy sent to Wood Reese MD    Please note that this chart was generated using voice recognition Dragon dictation software.  Although every effort was made to ensure the accuracy of this automated transcription, some errors in transcription may have occurred.

## 2024-10-24 NOTE — CARE COORDINATION
IMM letter provided to patient.  Patient offered four hours to make informed decision regarding appeal process; patient agreeable to discharge.   Electronically signed by HIPOLITO Hanson on 10/24/2024 at 4:08 PM

## 2024-10-24 NOTE — DISCHARGE INSTR - COC
Continuity of Care Form    Patient Name: Criselda Tinoco   :  1950  MRN:  [id number]    Admit date:  10/21/2024  Discharge date:  10/25/2024    Code Status Order: Full Code   Advance Directives:   Advance Care Flowsheet Documentation             Admitting Physician:  Jm Epstein MD  PCP: Wood St MD    Discharging Nurse: Britt Vines   Discharging Hospital Unit/Room#: 2115/2115-01  Discharging Unit Phone Number: [phone]    Emergency Contact:   Extended Emergency Contact Information  Primary Emergency Contact: True Tinoco  Address: [address] [place] [address] [address]           [address]  Home Phone: [phone]  Mobile Phone: [phone]  Relation: Spouse  Hearing or visual needs: None  Other needs: None  Preferred language: English   needed? No  Secondary Emergency Contact: Eduardo Lesly  Home Phone: [phone]  Mobile Phone: [phone]  Relation: Child    Past Surgical History:  Past Surgical History:   Procedure Laterality Date    ABDOMEN SURGERY      benign tumor removed near remaining ovary, 1.5 pounds    APPENDECTOMY      appendix ruptured, developed peritonitis    BACK SURGERY      BUNIONECTOMY Left     along with calcium deposits removed    CARDIAC CATHETERIZATION      negative    CERVICAL FUSION N/A 2021    POSTERIOR C3-6 LAMINECTOMY, PARTIAL C7 LAMINECTOMY, FUSION C3-C6, SILVERCORD performed by Malena Guy DO at [facility]    CERVICAL FUSION N/A 2023    ANTERIOR CERVICAL DISCECTOMY, OSTEOTOMY, FUSION,  C6-7, CORRECTION OF DEFORMITY. performed by Malena Guy DO at [facility] OR    CERVICAL FUSION N/A 2023    POSTERIOR CERVICAL OSTEOTOMY C6-7, REMOVAL OF PREVIOUS HARDWARE, CERVICAL FUSION C2-T2, CORRECTION OF DEFORMITY. performed by Malena Guy DO at [facility] OR    COLECTOMY  1969    12 INCHES REMOVED D/T OBSTRUCTION    COLONOSCOPY      CYST REMOVAL Right     right facial    FINGER CLOSED REDUCTION Left 2022    CLOSED  Acute renal failure (HCC) N17.9    Clostridium difficile infection A49.8    Acquired spondylolisthesis M43.10    Spinal stenosis of lumbar region with neurogenic claudication M48.062    Acute deep vein thrombosis (DVT) of proximal vein of left lower extremity (HCC) I82.4Y2    Leg swelling M79.89    Back pain M54.9    Neurological disorder G98.8    Closed unstable burst fracture of fifth lumbar vertebra with routine healing S32.052D    Slow transit constipation K59.01    Acute deep vein thrombosis (DVT) of femoral vein of left lower extremity (HCC) I82.412    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) M48.02, G99.2    Acute deep vein thrombosis (DVT) of right femoral vein ([facility]) I82.411    S/P cervical spinal fusion Z98.1    Gait instability R26.81    Cervical myelopathy (HCC) G95.9    Cellulitis of both lower extremities L03.115, L03.116    Fracture of body of sternum, initial encounter for open fracture S22.22XB    Nondisplaced fracture of sternal end of left clavicle, initial encounter for closed fracture S42.018A    Erysipelas of right lower extremity A46    Closed fracture of body of sternum S22.22XA    Calculus of gallbladder without cholecystitis without obstruction K80.20    Leukocytosis D72.829    Allergy to sulfa drugs Z88.2    Bilateral cellulitis of lower leg L03.116, L03.115    Lymphedema of both lower extremities I89.0    Cerebral infarction, unspecified (HCC) I63.9    Chronic embolism and thrombosis of unspecified deep veins of proximal lower extremity, bilateral ([facility]) I82.5Y3    Other specified disorders of bone density and structure, unspecified site M85.80    Repeated falls R29.6    Fall as cause of accidental injury in home as place of occurrence, initial encounter W19.XXXA, Y92.009    Cellulitis L03.90    Chronic anticoagulation Z79.01    History of DVT (deep vein thrombosis) Z86.718    Prediabetes R73.03    History of CVA with residual deficit I69.30    Cervical myopathy G72.9

## 2024-10-24 NOTE — TELEPHONE ENCOUNTER
Criselda Tinoco dropped off  [facility] [employer]) Eligibility application     she needs needs completed for [employer] .      she needs this completed by asap .      Please fax/call/mail to  fax and then pick  up original at next visit     Paperwork was given to       Patients daughter Lesly dropped off forms for completion , informing that she is in hospital and will be released today..    It was discussed that patient needs to complete Communication release form in order to communicate , Lesly voiced understanding  and will inform her mother to complete in office at her next appointment

## 2024-10-24 NOTE — CARE COORDINATION
Writer submitted authorization through [employer].  Auth ID: [id number]   Electronically signed by HIPOLITO Hanson on 10/24/2024 at 3:43 PM

## 2024-10-24 NOTE — PLAN OF CARE
respiratory effort   Initiate smoking cessation protocol as indicated     Problem: Skin/Tissue Integrity - Adult  Goal: Skin integrity remains intact  10/24/2024 0225 by Flavia Marcos RN  Outcome: Progressing  Flowsheets (Taken 10/23/2024 2012)  Skin Integrity Remains Intact:   Monitor for areas of redness and/or skin breakdown   Assess vascular access sites hourly  10/23/2024 1641 by Supa Fernandes RN  Outcome: Progressing  Flowsheets (Taken 10/23/2024 0800)  Skin Integrity Remains Intact: Monitor for areas of redness and/or skin breakdown     Problem: Musculoskeletal - Adult  Goal: Return mobility to safest level of function  10/24/2024 0225 by Flavia Marcos RN  Outcome: Progressing  Flowsheets (Taken 10/23/2024 2012)  Return Mobility to Safest Level of Function:   Assess patient stability and activity tolerance for standing, transferring and ambulating with or without assistive devices   Assist with transfers and ambulation using safe patient handling equipment as needed   Ensure adequate protection for wounds/incisions during mobilization  10/23/2024 1641 by Supa Fernandes RN  Outcome: Progressing  Flowsheets (Taken 10/23/2024 0800)  Return Mobility to Safest Level of Function: Assess patient stability and activity tolerance for standing, transferring and ambulating with or without assistive devices     Problem: Gastrointestinal - Adult  Goal: Maintains or returns to baseline bowel function  10/24/2024 0225 by Flavia Marcos RN  Outcome: Progressing  Flowsheets (Taken 10/23/2024 2012)  Maintains or returns to baseline bowel function: Assess bowel function  10/23/2024 1641 by Supa Fernandes RN  Outcome: Progressing  Flowsheets (Taken 10/23/2024 0800)  Maintains or returns to baseline bowel function:   Encourage oral fluids to ensure adequate hydration   Assess bowel function  Goal: Maintains adequate nutritional intake  10/24/2024 0225 by Flavia Marcos RN  Outcome: Progressing  Flowsheets (Taken

## 2024-10-24 NOTE — PROGRESS NOTES
Physical Therapy  Facility/Department: [facility]    Name: Criselda Tinoco  : 1950  MRN: [id number]  Date of Service: 10/24/2024    Discharge Recommendations:  Continue to assess pending progress, Patient would benefit from continued therapy after discharge, Therapy recommended at discharge, 24 hour supervision or assist   PT Equipment Recommendations  Equipment Needed:  (TBD)      Patient Diagnosis(es): The encounter diagnosis was Slurred speech.  Past Medical History:  has a past medical history of Allergic rhinitis, cause unspecified, Back pain, Bowel obstruction (HCC), C. difficile diarrhea, CAD (coronary artery disease), Cardiac murmur, Cellulitis, Cerebral artery occlusion with cerebral infarction (HCC), COVID-19, Diverticulosis of colon (without mention of hemorrhage), GERD (gastroesophageal reflux disease), History of blood transfusion, History of CHF (congestive heart failure), History of MI (myocardial infarction), History of ovarian cyst, History of peritonitis, HTN (hypertension), Hx of blood clots, Hyperlipidemia, Intestinal or peritoneal adhesions with obstruction (postoperative) (postinfection) (HCC), Kidney infection, Lateral epicondylitis  of elbow, MDRO (multiple drug resistant organisms) resistance, Muscle strain, Other abnormal glucose, PONV (postoperative nausea and vomiting), Pre-diabetes, Restless legs syndrome (RLS), Snores, 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  Past Surgical History:  has a past surgical history that includes Ovary removal (); colectomy (); Appendectomy (); Ovary removal (); Hysterectomy (); Bunionectomy (Left); sinus surgery (); Colonoscopy; other surgical history (2014); cyst removal (Right); Wrist fracture surgery (Left, 2019); Upper  gastrointestinal endoscopy (N/A, 05/31/2019); Upper gastrointestinal endoscopy (N/A, 08/05/2019); Upper gastrointestinal endoscopy (N/A, 08/23/2019); Abdomen surgery (1976); lumbar fusion (N/A, 02/10/2020); Cardiac catheterization (2005); [place] tooth extraction; lumbar fusion (N/A, 06/17/2020); back surgery; cervical fusion (N/A, 05/21/2021); Finger Closed Reduction (Left, 08/24/2022); cervical fusion (N/A, 7/26/2023); and cervical fusion (N/A, 7/26/2023).    Assessment  History: CVA; cervical/lumbar spondylosis  Requires PT Follow-Up: Yes  Activity Tolerance  Activity Tolerance: Patient limited by endurance    Plan  Physical Therapy Plan  General Plan: 1 time a day 7 days a week  Current Treatment Recommendations: Strengthening, Balance training, Functional mobility training, Transfer training, Endurance training, Therapeutic activities, Safety education & training  Safety Devices  Type of Devices: Patient at risk for falls, Gait belt, Call light within reach, All fall risk precautions in place, Nurse notified, Chair alarm in place, Left in chair    Restrictions  Restrictions/Precautions  Restrictions/Precautions: Fall Risk, General Precautions  Required Braces or Orthoses?: No  Implants present? : Metal implants (left wrist and low back fusion, cervical fusion)     Subjective  General  Chart Reviewed: Yes  Response To Previous Treatment: Patient with no complaints from previous session.  Family / Caregiver Present: No  General Comment  Comments: RN approved therapy' co-treat with JONI Jaquez  Subjective  Subjective: Pt lying in bed upon arrival, agreeable to therapy    Cognition   Orientation  Overall Orientation Status: Within Functional Limits    Objective  Bed mobility  Supine to Sit: Maximum assistance  Sit to Supine: Unable to assess  Scooting: Maximal assistance  Bed Mobility Comments: Bed mobility completed with HOB elevated  Transfers  Sit to Stand: Maximum Assistance;2 Person Assistance  Stand to Sit:

## 2024-10-24 NOTE — PROGRESS NOTES
hours.    Invalid input(s): \"LDLCALCU\"  INR:   Recent Labs     10/21/24  2255   INR 1.5       Objective:   Vitals: /87   Pulse 68   Temp 97.4 °F (36.3 °C) (Axillary)   Resp 18   Ht 1.6 m (5' 3\")   Wt 90.7 kg (200 lb)   SpO2 97%   BMI 35.43 kg/m²   General appearance: alert and cooperative with exam  HEENT: Head: Normal, normocephalic, atraumatic.  Neck: no JVD and supple, symmetrical, trachea midline  Lungs: diminished breath sounds bibasilar  Heart: regular rate and rhythm  Abdomen:  Obese soft bowel sounds present  Extremities: Homans sign is negative, no sign of DVT  Neurologic: Mental status: Awake communicating well    Assessment / Acute Cardiac Problems:   Admission 10/21/2024 with TIA/sudden onset speech problem, negative CT brain, negative CTA head and neck  MRI brain cerebral atrophy no acute ischemic event  No previous history of coronary intervention  Normal LV systolic function, ejection fraction 60%  Hypertension stable  Hyperlipidemia  Congestive heart failure diastolic, compensated  Very poor mobility since cervical spine fusion 7/26/2023, gait instability  History of DVT on Eliquis    Patient Active Problem List:     Other specified disorders of rotator cuff syndrome of shoulder and allied disorders     Diverticulosis of large intestine     Intestinal or peritoneal adhesions with obstruction (postoperative) (postinfection) (HCC)     Restless legs syndrome (RLS)     GERD (gastroesophageal reflux disease)     Hypertension     Hyperlipidemia     Other abnormal glucose     Atherosclerosis     Allergic rhinitis     Vitamin D deficiency     Major depression     Degenerative joint disease (DJD) of hip     Peripheral edema     Injury of foot, left     Facial droop     CVA (cerebral vascular accident) (HCC)     Bradycardia     Ataxia     Aphasia     TIA (transient ischemic attack)     Need for prophylactic vaccination and inoculation against cholera alone     Osteopenia     Lumbago     Meralgia  patient's  unable to take care of her  Waiting for the placement    Electronically signed by Newton Gomez MD on 10/24/2024 at 9:20 AM

## 2024-10-24 NOTE — CARE COORDINATION
DISCHARGE PLANNING NOTE:    Writer is following for potential discharge placement. Referrals were sent in the [place] area in case pt required SNF placement.    Writer spoke to Alyssa with [facility]. Facility accepts this pt.    Writer placed message to Nava with [facility]. Facility denies this pt due to balance owed.    Writer placed call to Verito with [facility], pt insurance is out of network.     Writer placed message to Kim with [facility]. Facility is out of network with this pt, [facility] can accept this pt.    Writer placed call to Rosa Elena with [facility]. Referral is still in review.     Writer placed call to pt daughter Lesly, requests pt to make decision if she is able. Pt states [facility] is first choice. If cannot accept, agreeable to [facility].    Writer placed call to pt spouse Will for update. No answer, voicemail left for return call.   Electronically signed by HIPOLITO Hanson on 10/24/2024 at 12:24 PM    Writer received call from Rosa Elena to obtain pt social security number to review benefits.  Electronically signed by HIPOLITO Hanson on 10/24/2024 at 3:23 PM    [facility] accepts this pt. Writer met with pt to confirm acceptance.   Electronically signed by HIPOLITO Hanson on 10/24/2024 at 4:08 PM

## 2024-10-24 NOTE — PROCEDURES
INSTRUMENTAL SWALLOW REPORT  MODIFIED BARIUM SWALLOW    NAME: Criselda Tinoco   : 1950  MRN: [id number]       Date of Eval: 10/24/2024        Referring Diagnosis(es): r/o dysphagia    Past Medical History:  has a past medical history of Allergic rhinitis, cause unspecified, Back pain, Bowel obstruction (HCC), C. difficile diarrhea, CAD (coronary artery disease), Cardiac murmur, Cellulitis, Cerebral artery occlusion with cerebral infarction (HCC), COVID-19, Diverticulosis of colon (without mention of hemorrhage), GERD (gastroesophageal reflux disease), History of blood transfusion, History of CHF (congestive heart failure), History of MI (myocardial infarction), History of ovarian cyst, History of peritonitis, HTN (hypertension), Hx of blood clots, Hyperlipidemia, Intestinal or peritoneal adhesions with obstruction (postoperative) (postinfection) (HCC), Kidney infection, Lateral epicondylitis  of elbow, MDRO (multiple drug resistant organisms) resistance, Muscle strain, Other abnormal glucose, PONV (postoperative nausea and vomiting), Pre-diabetes, Restless legs syndrome (RLS), Snores, Stenosis of cervical spine with myelopathy ([facility]), TIA (transient ischemic attack), Under care of service provider, Under care of service provider, Uses walker, Vitamin D deficiency, Wears glasses, and Wellness examination.  Past Surgical History:  has a past surgical history that includes Ovary removal (); colectomy (); Appendectomy (); Ovary removal (); Hysterectomy (); Bunionectomy (Left); sinus surgery (); Colonoscopy; other surgical history (2014); cyst removal (Right); Wrist fracture surgery (Left, 2019); Upper gastrointestinal endoscopy (N/A, 2019); Upper gastrointestinal endoscopy (N/A, 2019); Upper gastrointestinal endoscopy (N/A, 2019); Abdomen surgery (); lumbar fusion (N/A, 02/10/2020); Cardiac catheterization (); [place] tooth extraction; lumbar fusion (N/A,

## 2024-10-24 NOTE — PROGRESS NOTES
Occupational Therapy  Facility/Department: [facility] [place] PROGRESSIVE CARE  Rehabilitation Occupational Therapy Daily Treatment Note    Date: 10/24/24  Patient Name: Criselda Tinoco       Room:   MRN: [id number]  Account: [id number]   : 1950  (74 y.o.) Gender: female      MEENU Aragon reports patient is medically stable for therapy treatment this date. Chart reviewed prior to treatment and patient is agreeable for therapy.  All lines intact and patient positioned comfortably at end of treatment.  All patient needs addressed prior to ending therapy session.      Pt currently functioning below baseline.  Recommend daily inpatient skilled therapy at time of discharge to maximize long term outcomes and prevent re-admission. Please refer to AM-PAC score for current level of function.               Past Medical History:  has a past medical history of Allergic rhinitis, cause unspecified, Back pain, Bowel obstruction (HCC), C. difficile diarrhea, CAD (coronary artery disease), Cardiac murmur, Cellulitis, Cerebral artery occlusion with cerebral infarction (HCC), COVID-19, Diverticulosis of colon (without mention of hemorrhage), GERD (gastroesophageal reflux disease), History of blood transfusion, History of CHF (congestive heart failure), History of MI (myocardial infarction), History of ovarian cyst, History of peritonitis, HTN (hypertension), Hx of blood clots, Hyperlipidemia, Intestinal or peritoneal adhesions with obstruction (postoperative) (postinfection) (HCC), Kidney infection, Lateral epicondylitis  of elbow, MDRO (multiple drug resistant organisms) resistance, Muscle strain, Other abnormal glucose, PONV (postoperative nausea and vomiting), Pre-diabetes, Restless legs syndrome (RLS), Snores, 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 ischemic attack), Under care of service provider, Under care of service provider, Uses walker, Vitamin D deficiency, Wears glasses, and Wellness  1126)  ADL Inpatient CMS G-Code Modifier : CL (10/24/24 1126)      Therapy Time   Individual Concurrent Group Co-treatment   Time In 0948         Time Out 1013         Minutes 25               Co-treatment with PTA Angelika warranted secondary to decreased safety and independence requiring 2 skilled therapy professionals to address individual discipline's goals. OT addressing preparation for ADL transfer, sitting balance for increased ADL performance, sitting/activity tolerance, functional reaching, environmental safety/scanning, fall prevention, functional mobility for ADL transfers, ability to sequence and follow directions, bed mobility tech, and functional UE strength.     SHWETA PLEITEZ

## 2024-10-24 NOTE — PROGRESS NOTES
Comprehensive Nutrition Assessment    Type and Reason for Visit:  Initial, Positive Nutrition Screen (Poor appetite, intake, and wt loss.)    Nutrition Recommendations/Plan:   Will provide 5 carbohydrate choices per tray  Provide Ensure Ensure High Protein once daily.     Malnutrition Assessment:  Malnutrition Status:  No malnutrition (10/24/24 1418)    Context:  Chronic Illness     Findings of the 6 clinical characteristics of malnutrition:  Energy Intake:     Weight Loss:   (9% wt loss in 8 months: from 3/9/2024 with 99.8 kg (Estimated) to today's date with 90.7 kg (Stated))     Body Fat Loss:  No significant body fat loss     Muscle Mass Loss:  No significant muscle mass loss    Fluid Accumulation:  No significant fluid accumulation     Strength:  Not Performed    Nutrition Assessment:    Pt admitted for TIA, with PMH of DM2, COPD, CHF, IBS, diverticulosis. Pt reporting good intake of meals, requiring assistance with cutting up food. Pt is willing to try Ensure High Protein once daily.    Nutrition Related Findings:    Constipation, last BM 10/22. Gluc 116. Trace edema RLE/LLE. Micotin. Wound Type: Skin Tears       Current Nutrition Intake & Therapies:    Average Meal Intake: %     ADULT DIET; Regular  ADULT ORAL NUTRITION SUPPLEMENT; Breakfast; Low Calorie/High Protein Oral Supplement    Anthropometric Measures:  Height: 160 cm (5' 2.99\")  Ideal Body Weight (IBW): 115 lbs (52 kg)    Admission Body Weight: 90.7 kg (199 lb 15.3 oz)  Current Body Weight: 90.7 kg (199 lb 15.3 oz), 173.9 % IBW. Weight Source: Stated  Current BMI (kg/m2): 35.4  Usual Body Weight: 99.8 kg (220 lb 0.3 oz) (3/9/2024 (~8mo.) Estimated)  % Weight Change (Calculated): -9.1  Weight Adjustment For: No Adjustment                 BMI Categories: Obese Class 2 (BMI 35.0 -39.9)    Estimated Daily Nutrient Needs:  Energy Requirements Based On: Formula  Weight Used for Energy Requirements: Current  Energy (kcal/day): 4480-9924 kcal/day  based on [facility] 1.2-1.3 factor  Weight Used for Protein Requirements: Ideal  Protein (g/day): 57-62 g/day based on 1.1-1.2 g/kg  Method Used for Fluid Requirements: 1 ml/kcal  Fluid (ml/day): or per physician    Nutrition Diagnosis:   Increased nutrient needs related to other (comment) (Healing) as evidenced by wounds    Nutrition Interventions:   Food and/or Nutrient Delivery: Continue Current Diet, Start Oral Nutrition Supplement  Nutrition Education/Counseling: No recommendation at this time  Coordination of Nutrition Care: Continue to monitor while inpatient       Goals:  Previous Goal Met:  (New goal)  Goals: Meet at least 75% of estimated needs       Nutrition Monitoring and Evaluation:   Behavioral-Environmental Outcomes: None Identified  Food/Nutrient Intake Outcomes: Food and Nutrient Intake, Supplement Intake  Physical Signs/Symptoms Outcomes: Biochemical Data, GI Status, Fluid Status or Edema, Skin, Weight    Discharge Planning:    Too soon to determine     Carolina Maynard MS, LD  Contact: [phone]

## 2024-10-25 VITALS
SYSTOLIC BLOOD PRESSURE: 151 MMHG | BODY MASS INDEX: 35.44 KG/M2 | DIASTOLIC BLOOD PRESSURE: 59 MMHG | TEMPERATURE: 99.1 F | WEIGHT: 200 LBS | HEART RATE: 67 BPM | HEIGHT: 63 IN | OXYGEN SATURATION: 95 % | RESPIRATION RATE: 18 BRPM

## 2024-10-25 PROCEDURE — 97110 THERAPEUTIC EXERCISES: CPT

## 2024-10-25 PROCEDURE — 6370000000 HC RX 637 (ALT 250 FOR IP): Performed by: INTERNAL MEDICINE

## 2024-10-25 PROCEDURE — 99239 HOSP IP/OBS DSCHRG MGMT >30: CPT | Performed by: INTERNAL MEDICINE

## 2024-10-25 PROCEDURE — G0378 HOSPITAL OBSERVATION PER HR: HCPCS

## 2024-10-25 PROCEDURE — 82947 ASSAY GLUCOSE BLOOD QUANT: CPT

## 2024-10-25 PROCEDURE — 97530 THERAPEUTIC ACTIVITIES: CPT

## 2024-10-25 PROCEDURE — 2580000003 HC RX 258: Performed by: NURSE PRACTITIONER

## 2024-10-25 PROCEDURE — 6370000000 HC RX 637 (ALT 250 FOR IP): Performed by: NURSE PRACTITIONER

## 2024-10-25 RX ORDER — PREDNISONE 20 MG/1
20 TABLET ORAL DAILY
Status: DISCONTINUED | OUTPATIENT
Start: 2024-10-25 | End: 2024-10-25 | Stop reason: HOSPADM

## 2024-10-25 RX ORDER — PREDNISONE 20 MG/1
20 TABLET ORAL DAILY
Qty: 5 TABLET | Refills: 0 | Status: SHIPPED | OUTPATIENT
Start: 2024-10-25 | End: 2024-10-30

## 2024-10-25 RX ADMIN — PREDNISONE 20 MG: 20 TABLET ORAL at 12:17

## 2024-10-25 RX ADMIN — SERTRALINE 25 MG: 50 TABLET, FILM COATED ORAL at 09:19

## 2024-10-25 RX ADMIN — HYDRALAZINE HYDROCHLORIDE 25 MG: 25 TABLET ORAL at 09:17

## 2024-10-25 RX ADMIN — APIXABAN 5 MG: 5 TABLET, FILM COATED ORAL at 09:17

## 2024-10-25 RX ADMIN — CARVEDILOL 25 MG: 25 TABLET, FILM COATED ORAL at 09:18

## 2024-10-25 RX ADMIN — BUSPIRONE HYDROCHLORIDE 5 MG: 5 TABLET ORAL at 09:25

## 2024-10-25 RX ADMIN — FUROSEMIDE 40 MG: 40 TABLET ORAL at 09:19

## 2024-10-25 RX ADMIN — ATORVASTATIN CALCIUM 80 MG: 80 TABLET, FILM COATED ORAL at 09:19

## 2024-10-25 RX ADMIN — GABAPENTIN 200 MG: 100 CAPSULE ORAL at 09:19

## 2024-10-25 RX ADMIN — POTASSIUM CHLORIDE 20 MEQ: 750 CAPSULE, EXTENDED RELEASE ORAL at 09:19

## 2024-10-25 RX ADMIN — ASPIRIN 81 MG: 81 TABLET, COATED ORAL at 09:19

## 2024-10-25 RX ADMIN — ANTI-FUNGAL POWDER MICONAZOLE NITRATE TALC FREE: 1.42 POWDER TOPICAL at 09:23

## 2024-10-25 RX ADMIN — SODIUM CHLORIDE, PRESERVATIVE FREE 10 ML: 5 INJECTION INTRAVENOUS at 09:19

## 2024-10-25 RX ADMIN — AMLODIPINE BESYLATE 10 MG: 10 TABLET ORAL at 09:17

## 2024-10-25 RX ADMIN — ACETAMINOPHEN 650 MG: 325 TABLET ORAL at 03:42

## 2024-10-25 RX ADMIN — BACLOFEN 10 MG: 10 TABLET ORAL at 09:25

## 2024-10-25 ASSESSMENT — PAIN DESCRIPTION - LOCATION
LOCATION: BACK
LOCATION: NECK

## 2024-10-25 ASSESSMENT — PAIN DESCRIPTION - DESCRIPTORS
DESCRIPTORS: ACHING
DESCRIPTORS: ACHING

## 2024-10-25 ASSESSMENT — PAIN DESCRIPTION - FREQUENCY: FREQUENCY: INTERMITTENT

## 2024-10-25 ASSESSMENT — PAIN SCALES - GENERAL
PAINLEVEL_OUTOF10: 0
PAINLEVEL_OUTOF10: 6
PAINLEVEL_OUTOF10: 5

## 2024-10-25 ASSESSMENT — PAIN - FUNCTIONAL ASSESSMENT: PAIN_FUNCTIONAL_ASSESSMENT: PREVENTS OR INTERFERES SOME ACTIVE ACTIVITIES AND ADLS

## 2024-10-25 ASSESSMENT — PAIN DESCRIPTION - ONSET: ONSET: ON-GOING

## 2024-10-25 ASSESSMENT — PAIN DESCRIPTION - ORIENTATION: ORIENTATION: LOWER

## 2024-10-25 ASSESSMENT — PAIN DESCRIPTION - DIRECTION: RADIATING_TOWARDS: DENIES

## 2024-10-25 ASSESSMENT — PAIN DESCRIPTION - PAIN TYPE: TYPE: CHRONIC PAIN

## 2024-10-25 NOTE — PROGRESS NOTES
Discharge teaching and instructions for diagnosis/procedure of TIA completed with patient using teachback method. AVS reviewed. Escripted Rxs to home pharmacy. Patient voiced understanding regarding prescriptions, follow up appointments, and care of self at home. Denies questions. IV d/c'd with cath intact and drsg applied. Skin Pk/W/D. Easy respirations. Denies pain. D/c'd with all belongings. Discharged via cart/stretcher to  skilled nursing per EMS transportation

## 2024-10-25 NOTE — PROGRESS NOTES
No [id number] discharging to facility confirmed Marquis EATON Social Work 10/25/24 11:30am Other medications picked up by daughter unable to take back family will need to keep

## 2024-10-25 NOTE — CARE COORDINATION
Continuity of Care Form    Patient Name: Criselda Tinoco   :  1950  MRN:  [id number]    Admit date:  10/21/2024  Discharge date:  10/25/2024    Code Status Order: Full Code   Advance Directives:   Advance Care Flowsheet Documentation             Admitting Physician:  Jm Epstein MD  PCP: [address] MD    Discharging Nurse: Britt Vines   Discharging Hospital Unit/Room#: 2115/2115-01  Discharging Unit Phone Number: [phone]    Emergency Contact:   Extended Emergency Contact Information  Primary Emergency Contact: True Tinoco  Address: [address] [place] [address] [address]           [address]  Home Phone: [phone]  Mobile Phone: [phone]  Relation: Spouse  Hearing or visual needs: None  Other needs: None  Preferred language: English   needed? No  Secondary Emergency Contact: Eduardo Lesly  Home Phone: [phone]  Mobile Phone: [phone]  Relation: Child    Past Surgical History:  Past Surgical History:   Procedure Laterality Date    ABDOMEN SURGERY      benign tumor removed near remaining ovary, 1.5 pounds    APPENDECTOMY      appendix ruptured, developed peritonitis    BACK SURGERY      BUNIONECTOMY Left     along with calcium deposits removed    CARDIAC CATHETERIZATION      negative    CERVICAL FUSION N/A 2021    POSTERIOR C3-6 LAMINECTOMY, PARTIAL C7 LAMINECTOMY, FUSION C3-C6, SILVERCORD performed by Malena Guy DO at [facility] OR    CERVICAL FUSION N/A 2023    ANTERIOR CERVICAL DISCECTOMY, OSTEOTOMY, FUSION,  C6-7, CORRECTION OF DEFORMITY. performed by Malena Guy DO at [facility] OR    CERVICAL FUSION N/A 2023    POSTERIOR CERVICAL OSTEOTOMY C6-7, REMOVAL OF PREVIOUS HARDWARE, CERVICAL FUSION C2-T2, CORRECTION OF DEFORMITY. performed by Malena Guy DO at [facility] OR    COLECTOMY  1969    12 INCHES REMOVED D/T OBSTRUCTION    COLONOSCOPY      CYST REMOVAL Right     right facial    FINGER CLOSED REDUCTION Left 2022    CLOSED  (with meals)   furosemide 40 MG tablet  Commonly known as: LASIX  Take 1 tablet by mouth daily   gabapentin 100 MG capsule  Commonly known as: NEURONTIN  Take 2 capsules by mouth 2 times daily for 90 days.   hydrALAZINE 25 MG tablet  Commonly known as: APRESOLINE  Take 1 tablet by mouth 3 times daily   * lidocaine 4 % cream  Commonly known as: LMX  Apply topically as needed for Pain Apply topically as needed.   * lidocaine 4 % external patch  Place 1 patch onto the skin nightly   magnesium hydroxide 400 MG/5ML suspension  Commonly known as: MILK OF MAGNESIA  Take by mouth daily as needed for Constipation   melatonin 3 MG Tabs tablet  Take 2 tablets by mouth nightly as needed (insomnia)   potassium chloride 10 MEQ extended release tablet  Commonly known as: KLOR-CON  Take 2 tablets by mouth daily   sertraline 25 MG tablet  Commonly known as: ZOLOFT  Take 1 tablet by mouth in the morning, at noon, and at bedtime    very important  * This list has 2 medication(s) that are the same as other medications prescribed for you. Read the directions carefully, and ask your doctor or other care provider to review them with you.       STOP taking these medications    STOP taking these medications  albuterol-ipratropium  MCG/ACT Aers inhaler  Commonly known as: COMBIVENT RESPIMAT   omeprazole 20 MG delayed release capsule  Commonly known as: PRILOSEC   ondansetron 4 MG disintegrating tablet  Commonly known as: ZOFRAN-ODT   PROTONIX PO   Where to Get Your Medications      These medications were sent to [id number] - [place], [place] - [address] [address] [facility] - P [phone] -  [phone]  [address] [place], [place] [address]  Phone: [phone]       aspirin 81 MG EC tablet        atorvastatin 80 MG tablet        benzonatate 100 MG capsule        miconazole 2 % powder      These medications were sent to [id number] - [place], [place] - [address] [facility] [address] - P [phone] - f

## 2024-10-25 NOTE — CARE COORDINATION
Transportation arranged for patient to go to [facility] at 1415 via [facility]. Facility informed. Bedside nurse informed.     Number for Report: [phone]  [facility]   Electronically signed by HIPOLITO Hanson on 10/25/2024 at 11:33 AM

## 2024-10-25 NOTE — CARE COORDINATION
DISCHARGE PLANNING NOTE:    Writer is following for potential discharge to [facility]. Writer checked authorization status in [employer] portal. Pt has been approved 10/25-10/29.    Writer placed message to Rosa Elena and faxed authorization to [phone]. Rosa Elena will place call to writer after morning meeting to verify when pt is able to admit.  Electronically signed by HIPOLITO Hanson on 10/25/2024 at 9:08 AM

## 2024-10-25 NOTE — PROGRESS NOTES
[facility]   INPATIENT OCCUPATIONAL THERAPY  PROGRESS NOTE  Date: 10/25/2024  Patient Name: Criselda Tinoco       Room:   MRN: [id number]    : 1950  (74 y.o.)  Gender: female      Diagnosis: TIA    Discharge Recommendations:  Further Occupational Therapy is recommended upon facility discharge.    OT Equipment Recommendations  Other: TBD    Restrictions/Precautions  Restrictions/Precautions  Restrictions/Precautions: Fall Risk;General Precautions  Required Braces or Orthoses?: No  Implants present? : Metal implants (left wrist and low back fusion, cervical fusion)    O2 Device: None (Room air)    Subjective  Subjective  Subjective: \"I can try because this chair is more comfortable\" pt pleasant and agreeable to OT/PT tx  Subjective  Pain: Pt reports pain in neck and shoulders d/t arthritus  Comments: Okay for co-tx with [facility] per RN Britt    Objective  Orientation  Overall Orientation Status: Within Functional Limits  Orientation Level: Oriented X4  Cognition  Overall Cognitive Status: Exceptions  Arousal/Alertness: Appears intact  Following Commands: Follows one step commands with repetition;Follows one step commands with increased time  Attention Span: Attends with cues to redirect  Memory: Appears intact  Safety Judgement: Decreased awareness of need for assistance;Decreased awareness of need for safety  Problem Solving: Assistance required to implement solutions;Assistance required to correct errors made;Assistance required to identify errors made;Assistance required to generate solutions  Insights: Decreased awareness of deficits  Initiation: Requires cues for some  Sequencing: Requires cues for some  Cognition Comment: Increased processing time    Activities of Daily Living  Additional Comments: Pt is limited by decreased strength, endurance, activity tolerance, impaired balance and general weakness which impacts the pt's ability to safely and independently complete  Outcome: Verbalized understanding, Demonstrated understanding, Continued education needed    Goals  Short Term Goals  Time Frame for Short Term Goals: By discharge  Short Term Goal 1: Pt will complete lower body dressing/bathing with Mod A and Good safety with use of AE as needed  Short Term Goal 2: Pt will complete functional transfers/mobility during self care tasks with Mod A and Good safety with use of least restrictive device  Short Term Goal 3: Pt will tolerate standing 3+ minutes during functional activity of choice with Min A and Good safety  Short Term Goal 4: Pt will verbalize/demonstrate Good understanding of AE/adaptive strategies/DME to increase safety and independence with self care and mobility  Short Term Goal 5: Pt will participate in 15+ minutes of therapeutic exercises/functional activities to increase safety and independence with self care and mobility  Occupational Therapy Plan  Times Per Week: 5-7  Current Treatment Recommendations: Self-Care / ADL, Strengthening, ROM, Balance training, Functional mobility training, Endurance training, Wheelchair mobility training, Cognitive reorientation, Pain management, Safety education & training, Patient/Caregiver education & training, Equipment evaluation, education, & procurement, Home management training, Cognitive/Perceptual training, Coordination training, Co-Treatment    Assessment  Activity Tolerance  Activity Tolerance: Patient Tolerated treatment well, Patient limited by fatigue  Assessment  Performance deficits / Impairments: Decreased ADL status, Decreased functional mobility , Decreased ROM, Decreased strength, Decreased safe awareness, Decreased cognition, Decreased endurance, Decreased balance, Decreased high-level IADLs, Decreased fine motor control, Decreased coordination, Decreased posture  Treatment Diagnosis: Impaired self care status  Prognosis: Fair  Decision Making: Medium Complexity  Discharge Recommendations: Patient would benefit

## 2024-10-25 NOTE — PROGRESS NOTES
10/22/2024  Ejection fraction 60%, mild LVH, normal wall motion  Normal size LA and RA color Doppler negative for stent agitated saline study was negative with and without provocation  No significant valvular abnormality  Normal IVC diameter and inspiratory variation     Chest x-ray 10/21/2024  Cardiomegaly with mild pulmonary vascular congestion  Possible small left-sided pleural effusion     Lexiscan Myoview stress test 12/26/2023  Normal distribution of isotope throughout the left ventricle, ejection fraction 75%  Normal wall motions     Lexiscan Myoview stress test 7/16/2016  No definitive evidence of reversible ischemia or infarction ejection fraction 67%      Medications:   Scheduled Meds:   sodium chloride flush  5-40 mL IntraVENous 2 times per day    miconazole   Topical BID    apixaban  5 mg Oral BID    carvedilol  25 mg Oral BID WC    amLODIPine  10 mg Oral Daily    baclofen  10 mg Oral BID    busPIRone  5 mg Oral TID    furosemide  40 mg Oral Daily    gabapentin  200 mg Oral BID    hydrALAZINE  25 mg Oral TID    lidocaine  1 patch TransDERmal Nightly    sertraline  25 mg Oral TID    potassium chloride  20 mEq Oral Daily    atorvastatin  80 mg Oral Daily    aspirin  81 mg Oral Daily     Continuous Infusions:   sodium chloride       CBC:   Recent Labs     10/23/24  0534 10/24/24  0530   WBC 8.5 8.1   HGB 12.7 12.5    192     BMP:    Recent Labs     10/23/24  0534 10/24/24  0530    140   K 3.8 4.0    108*   CO2 21 21   BUN 22 26*   CREATININE 0.9 0.8   GLUCOSE 134* 116*     Hepatic: No results for input(s): \"AST\", \"ALT\", \"BILITOT\", \"ALKPHOS\" in the last 72 hours.    Invalid input(s): \"ALB\"  Troponin: No results for input(s): \"TROPONINI\" in the last 72 hours.  BNP: No results for input(s): \"BNP\" in the last 72 hours.  Lipids: No results for input(s): \"CHOL\", \"HDL\" in the last 72 hours.    Invalid input(s): \"LDLCALCU\"  INR: No results for input(s): \"INR\" in the last 72 hours.    Objective:  vein thrombosis (DVT) of right femoral vein ([facility])     S/P cervical spinal fusion     Gait instability     Cervical myelopathy ([facility])     Cellulitis of both lower extremities     Fracture of body of sternum, initial encounter for open fracture     Nondisplaced fracture of sternal end of left clavicle, initial encounter for closed fracture     Erysipelas of right lower extremity     Closed fracture of body of sternum     Calculus of gallbladder without cholecystitis without obstruction     Leukocytosis     Allergy to sulfa drugs     Bilateral cellulitis of lower leg     Lymphedema of both lower extremities     Cerebral infarction, unspecified ([facility])     Chronic embolism and thrombosis of unspecified deep veins of proximal lower extremity, bilateral ([facility])     Other specified disorders of bone density and structure, unspecified site     Repeated falls     Fall as cause of accidental injury in home as place of occurrence, initial encounter     Cellulitis     Chronic anticoagulation     History of DVT (deep vein thrombosis)     Prediabetes     History of CVA with residual deficit     Cervical myopathy     Hypervolemia     Congenital kyphosis of cervical region     Status post surgery     Cervical spondylosis     Azotemia     Spinal stenosis in cervical region     Other secondary kyphosis, cervical region     Chronic obstructive pulmonary disease, unspecified     BIN (acute kidney injury) ([facility])     Left-sided weakness     Hyperkalemia     History of stroke     Acute encephalopathy     Angina pectoris ([facility])     Chronic left shoulder pain     Slurred speech      Plan of Treatment:   Medications reviewed  Admitted with TIA, sudden onset speech problem, CT head, CTA head and neck and MRI unremarkable  1: History of CVA continue current dose of aspirin 81 mg, Lipitor 80 mg a day  2: History of DVT on Eliquis  3: Diastolic CHF on oral Lasix 40 mg a day continue current  carvedilol 25 mg twice a day, potassium 20 mill equivalent a

## 2024-10-25 NOTE — CARE COORDINATION
HENS complete.  Document ID : [id number]  Electronically signed by HIPOLITO Hanson on 10/25/2024 at 2:12 PM

## 2024-10-25 NOTE — PROGRESS NOTES
[facility] Internal Medicine  Jung Machado MD; Jean Salguero MD; Jm Epstein MD; MD Virginia Louise MD; Beau Anderson MD    [facility] Internal Medicine   IN-PATIENT SERVICE   [facility]    HISTORY AND PHYSICAL EXAMINATION            Date:   10/25/2024  Patient name:  Criselda Tinoco  Date of admission:  10/21/2024 10:51 PM  MRN:   [id number]  Account:  [id number]  YOB: 1950  PCP:    Wood St MD  Room:   2115/2115-01  Code Status:    Full Code    Chief Complaint:     Chief Complaint   Patient presents with    Aphasia   Speech deficit    History Obtained From:     Pt medical record and nursing staff    History of Present Illness:     Criselda Tinoco is a 74 y.o. Non- / non  female who presents with Aphasia   and is admitted to the hospital for the management of TIA (transient ischemic attack).  Criselda Tinoco is a 74 y.o. Non- / non  female who presents with Aphasia   and is admitted to the hospital for the management of TIA (transient ischemic attack).     Patient's medical history significant for DVT -chronically anticoagulated on Eliquis, cerebral infarction with chronic left-sided weakness, COPD, and DM type II.     According to patient, she was at home earlier this evening, when her  noticed a change in her speech and called 911.  Patient states that her daughter visited at 4:30 PM and she was fine at that time; however, about 530 she began \"talking goofy.\".  Patient reports that she fell in her bathtub yesterday and hit the back of her head.  She denies losing consciousness and did not come to be evaluated in the ED.  She is chronically anticoagulated on Eliquis.  Patient reports that she resided at [facility] for 9 months for rehabilitative therapy and has only been home for the past 3 months.     Stroke alert was called prior to arrival.  CT head shows no acute intercranial abnormality.

## 2024-10-25 NOTE — PROGRESS NOTES
gastrointestinal endoscopy (N/A, 05/31/2019); Upper gastrointestinal endoscopy (N/A, 08/05/2019); Upper gastrointestinal endoscopy (N/A, 08/23/2019); Abdomen surgery (1976); lumbar fusion (N/A, 02/10/2020); Cardiac catheterization (2005); [place] tooth extraction; lumbar fusion (N/A, 06/17/2020); back surgery; cervical fusion (N/A, 05/21/2021); Finger Closed Reduction (Left, 08/24/2022); cervical fusion (N/A, 7/26/2023); and cervical fusion (N/A, 7/26/2023).    Assessment  History: CVA; cervical/lumbar spondylosis  Requires PT Follow-Up: Yes  Activity Tolerance  Activity Tolerance: Patient limited by endurance    Plan  Physical Therapy Plan  General Plan: 1 time a day 7 days a week  Current Treatment Recommendations: Strengthening, Balance training, Functional mobility training, Transfer training, Endurance training, Therapeutic activities, Safety education & training  Safety Devices  Type of Devices: All fall risk precautions in place, Call light within reach, Chair alarm in place, Gait belt, Patient at risk for falls, Heels elevated for pressure relief, Left in chair, Nurse notified  Restraints  Restraints Initially in Place: No    Restrictions  Restrictions/Precautions  Restrictions/Precautions: Fall Risk, General Precautions  Required Braces or Orthoses?: No  Implants present? : Metal implants (left wrist and low back fusion, cervical fusion)     Subjective  Pain: Pt reports pain in neck and shoulders d/t arthritus  General  Chart Reviewed: Yes  Patient assessed for rehabilitation services?: Yes  Response To Previous Treatment: Patient with no complaints from previous session.  Family / Caregiver Present: No  General Comment  Comments: RN approved therapy' co-treat with AURA De La Torre  Subjective  Subjective: Pt lying in bed upon arrival, agreeable to therapy     Cognition   Orientation  Overall Orientation Status: Within Functional Limits    Objective  Bed mobility  Supine to Sit: Maximum assistance  Sit to Supine:        Electronically signed by Angelika Ngo PTA on 10/25/24 at 2:54 PM EDT

## 2024-10-29 ENCOUNTER — HOSPITAL ENCOUNTER (OUTPATIENT)
Age: 74
Setting detail: SPECIMEN
Discharge: HOME | End: 2024-10-29

## 2024-10-29 LAB
25(OH)D3 SERPL-MCNC: 19.7 NG/ML (ref 30–100)
ALBUMIN SERPL-MCNC: 4 G/DL (ref 3.5–5.2)
ALBUMIN/GLOB SERPL: 2 {RATIO} (ref 1–2.5)
ALP SERPL-CCNC: 57 U/L (ref 35–104)
ALT SERPL-CCNC: 19 U/L (ref 10–35)
ANION GAP SERPL CALCULATED.3IONS-SCNC: 11 MMOL/L (ref 9–16)
AST SERPL-CCNC: 21 U/L (ref 10–35)
BILIRUB SERPL-MCNC: 0.4 MG/DL (ref 0–1.2)
BUN SERPL-MCNC: 32 MG/DL (ref 8–23)
CALCIUM SERPL-MCNC: 8.7 MG/DL (ref 8.6–10.4)
CHLORIDE SERPL-SCNC: 107 MMOL/L (ref 98–107)
CHOLEST SERPL-MCNC: 130 MG/DL (ref 0–199)
CHOLESTEROL/HDL RATIO: 2
CO2 SERPL-SCNC: 24 MMOL/L (ref 20–31)
CREAT SERPL-MCNC: 0.8 MG/DL (ref 0.6–0.9)
ERYTHROCYTE [DISTWIDTH] IN BLOOD BY AUTOMATED COUNT: 13.3 % (ref 11.8–14.4)
EST. AVERAGE GLUCOSE BLD GHB EST-MCNC: 123 MG/DL
GFR, ESTIMATED: 75 ML/MIN/1.73M2
GLUCOSE BLD-MCNC: 111 MG/DL (ref 65–105)
GLUCOSE BLD-MCNC: 125 MG/DL (ref 65–105)
GLUCOSE BLD-MCNC: 132 MG/DL (ref 65–105)
GLUCOSE BLD-MCNC: 142 MG/DL (ref 65–105)
GLUCOSE SERPL-MCNC: 99 MG/DL (ref 74–99)
HBA1C MFR BLD: 5.9 % (ref 4–6)
HCT VFR BLD AUTO: 35.6 % (ref 36.3–47.1)
HDLC SERPL-MCNC: 63 MG/DL
HGB BLD-MCNC: 11.4 G/DL (ref 11.9–15.1)
LDLC SERPL CALC-MCNC: 54 MG/DL (ref 0–100)
MAGNESIUM SERPL-MCNC: 2.4 MG/DL (ref 1.6–2.4)
MCH RBC QN AUTO: 31.5 PG (ref 25.2–33.5)
MCHC RBC AUTO-ENTMCNC: 32 G/DL (ref 28.4–34.8)
MCV RBC AUTO: 98.3 FL (ref 82.6–102.9)
NRBC BLD-RTO: 0 PER 100 WBC
PLATELET # BLD AUTO: 231 K/UL (ref 138–453)
PMV BLD AUTO: 10.2 FL (ref 8.1–13.5)
POTASSIUM SERPL-SCNC: 4.1 MMOL/L (ref 3.7–5.3)
PROT SERPL-MCNC: 6.4 G/DL (ref 6.6–8.7)
RBC # BLD AUTO: 3.62 M/UL (ref 3.95–5.11)
SODIUM SERPL-SCNC: 142 MMOL/L (ref 136–145)
TRIGL SERPL-MCNC: 66 MG/DL (ref 0–149)
TSH SERPL DL<=0.05 MIU/L-ACNC: 1.85 UIU/ML (ref 0.27–4.2)
VLDLC SERPL CALC-MCNC: 13 MG/DL (ref 1–30)
WBC OTHER # BLD: 9.7 K/UL (ref 3.5–11.3)

## 2024-10-29 PROCEDURE — 85027 COMPLETE CBC AUTOMATED: CPT

## 2024-10-29 PROCEDURE — 36415 COLL VENOUS BLD VENIPUNCTURE: CPT

## 2024-10-29 PROCEDURE — P9603 ONE-WAY ALLOW PRORATED MILES: HCPCS

## 2024-10-29 PROCEDURE — 83735 ASSAY OF MAGNESIUM: CPT

## 2024-10-29 PROCEDURE — 82306 VITAMIN D 25 HYDROXY: CPT

## 2024-10-29 PROCEDURE — 80053 COMPREHEN METABOLIC PANEL: CPT

## 2024-10-29 PROCEDURE — 80061 LIPID PANEL: CPT

## 2024-10-29 PROCEDURE — 83036 HEMOGLOBIN GLYCOSYLATED A1C: CPT

## 2024-10-29 PROCEDURE — 84443 ASSAY THYROID STIM HORMONE: CPT

## 2024-11-13 ENCOUNTER — TELEPHONE (OUTPATIENT)
Dept: INTERNAL MEDICINE CLINIC | Age: 74
End: 2024-11-13

## 2024-11-13 NOTE — TELEPHONE ENCOUNTER
[facility] / Denisha calling to verify if Dr St will be the following physician for home care orders, confirmed yes.

## 2024-11-18 RX ORDER — ACETAMINOPHEN 325 MG/1
650 TABLET ORAL EVERY 4 HOURS PRN
Qty: 120 TABLET | Refills: 0 | Status: SHIPPED | OUTPATIENT
Start: 2024-11-18

## 2024-11-21 RX ORDER — HYDRALAZINE HYDROCHLORIDE 25 MG/1
25 TABLET, FILM COATED ORAL 3 TIMES DAILY
Qty: 90 TABLET | Refills: 2 | Status: SHIPPED | OUTPATIENT
Start: 2024-11-21

## 2024-11-22 ENCOUNTER — TELEPHONE (OUTPATIENT)
Dept: INTERNAL MEDICINE CLINIC | Age: 74
End: 2024-11-22

## 2024-11-22 NOTE — TELEPHONE ENCOUNTER
[facility] nurse / Lesly contacting physician to inform a follow up hospitalization call yesterday to patient but was unable to do a medication reconciliation  ,  patient did voice that she is taking all of her medications as prescrbed

## 2024-11-22 NOTE — TELEPHONE ENCOUNTER
LVM requesting pt return office call. No upcoming appts, would like to schedule patient if needed.

## 2024-11-25 ENCOUNTER — OFFICE VISIT (OUTPATIENT)
Dept: ORTHOPEDIC SURGERY | Age: 74
End: 2024-11-25
Payer: MEDICARE

## 2024-11-25 VITALS — BODY MASS INDEX: 35.44 KG/M2 | WEIGHT: 200 LBS | HEIGHT: 63 IN

## 2024-11-25 DIAGNOSIS — M19.012 OSTEOARTHRITIS OF LEFT GLENOHUMERAL JOINT: ICD-10-CM

## 2024-11-25 DIAGNOSIS — M25.512 LEFT SHOULDER PAIN, UNSPECIFIED CHRONICITY: Primary | ICD-10-CM

## 2024-11-25 PROCEDURE — 1090F PRES/ABSN URINE INCON ASSESS: CPT | Performed by: ORTHOPAEDIC SURGERY

## 2024-11-25 PROCEDURE — G8428 CUR MEDS NOT DOCUMENT: HCPCS | Performed by: ORTHOPAEDIC SURGERY

## 2024-11-25 PROCEDURE — 1123F ACP DISCUSS/DSCN MKR DOCD: CPT | Performed by: ORTHOPAEDIC SURGERY

## 2024-11-25 PROCEDURE — 3017F COLORECTAL CA SCREEN DOC REV: CPT | Performed by: ORTHOPAEDIC SURGERY

## 2024-11-25 PROCEDURE — 20610 DRAIN/INJ JOINT/BURSA W/O US: CPT | Performed by: ORTHOPAEDIC SURGERY

## 2024-11-25 PROCEDURE — G8399 PT W/DXA RESULTS DOCUMENT: HCPCS | Performed by: ORTHOPAEDIC SURGERY

## 2024-11-25 PROCEDURE — 1125F AMNT PAIN NOTED PAIN PRSNT: CPT | Performed by: ORTHOPAEDIC SURGERY

## 2024-11-25 PROCEDURE — G8417 CALC BMI ABV UP PARAM F/U: HCPCS | Performed by: ORTHOPAEDIC SURGERY

## 2024-11-25 PROCEDURE — 99204 OFFICE O/P NEW MOD 45 MIN: CPT | Performed by: ORTHOPAEDIC SURGERY

## 2024-11-25 PROCEDURE — 1036F TOBACCO NON-USER: CPT | Performed by: ORTHOPAEDIC SURGERY

## 2024-11-25 PROCEDURE — G8484 FLU IMMUNIZE NO ADMIN: HCPCS | Performed by: ORTHOPAEDIC SURGERY

## 2024-11-25 RX ORDER — TRIAMCINOLONE ACETONIDE 40 MG/ML
40 INJECTION, SUSPENSION INTRA-ARTICULAR; INTRAMUSCULAR ONCE
Status: COMPLETED | OUTPATIENT
Start: 2024-11-25 | End: 2024-11-25

## 2024-11-25 RX ORDER — LIDOCAINE HYDROCHLORIDE 10 MG/ML
5 INJECTION, SOLUTION INFILTRATION; PERINEURAL ONCE
Status: COMPLETED | OUTPATIENT
Start: 2024-11-25 | End: 2024-11-25

## 2024-11-25 RX ADMIN — TRIAMCINOLONE ACETONIDE 40 MG: 40 INJECTION, SUSPENSION INTRA-ARTICULAR; INTRAMUSCULAR at 15:05

## 2024-11-25 RX ADMIN — LIDOCAINE HYDROCHLORIDE 5 ML: 10 INJECTION, SOLUTION INFILTRATION; PERINEURAL at 15:04

## 2024-11-25 NOTE — PROGRESS NOTES
supraspinatus external rotation and internal rotation testing.  She has a painful arc of motion with active positive Neer's and Hall impingement sign    Imaging Studies  1 view x-ray of the left shoulder completed on 11/24/2024 and additional views completed on 6/28/2024 demonstrate moderate near complete glenohumeral joint space loss.  Moderately sized inferior humeral head osteophyte.  No obvious fracture, dislocation or notable subluxation.     Assessment and Plan  Criselda Tinoco is a 74 y.o. old female with left shoulder pain due to advanced glenohumeral joint osteoarthritis.  I did have a discussion with the patient today educating her about the condition of her shoulder and discussed treatment options available to her.  I recommended proceeding conservatively at this time with a cortisone injection which she was amenable to.  This was administered as outlined below.  I will see her back in my clinic as needed but she may return or call at anytime with persistent or worsening symptoms and with any questions or concerns.    Procedure: left shoulder glenohumeral injection  Following an appropriate discussion with the patient regarding the risks and benefits of the procedure she consented to proceed. her left shoulder was prepped using chlorhexadine solution. Using aseptic technique and through a posterior approach, her left shoulder glenohumeral joint space was injected with a 6 cc mixture of 1cc 40mg/ml kenalog and 5 cc of 1% lidocaine without epinephrine. A band aid was applied to the injection site. she tolerated the injection with no immediate adverse reactions.     This note is created with the assistance of a speech recognition program.  While intending to generate adocument that actually reflects the content of the visit, the document can still have some errors including those of syntax and sound a like substitutions which may escape proof reading.  It such instances, actual meaningcan be extrapolated

## 2024-11-26 ENCOUNTER — OFFICE VISIT (OUTPATIENT)
Dept: NEUROSURGERY | Age: 74
End: 2024-11-26
Payer: MEDICARE

## 2024-11-26 VITALS
HEART RATE: 71 BPM | BODY MASS INDEX: 35.44 KG/M2 | DIASTOLIC BLOOD PRESSURE: 98 MMHG | WEIGHT: 200 LBS | SYSTOLIC BLOOD PRESSURE: 159 MMHG | HEIGHT: 63 IN

## 2024-11-26 DIAGNOSIS — F48.2 PSEUDOBULBAR AFFECT: ICD-10-CM

## 2024-11-26 DIAGNOSIS — R27.0 ATAXIA: ICD-10-CM

## 2024-11-26 DIAGNOSIS — I69.30 HISTORY OF CVA WITH RESIDUAL DEFICIT: ICD-10-CM

## 2024-11-26 DIAGNOSIS — Z98.1 S/P CERVICAL SPINAL FUSION: ICD-10-CM

## 2024-11-26 DIAGNOSIS — R26.81 GAIT INSTABILITY: Primary | ICD-10-CM

## 2024-11-26 PROBLEM — G12.29 PSEUDOBULBAR PALSY (HCC): Status: ACTIVE | Noted: 2024-03-10

## 2024-11-26 PROCEDURE — 3017F COLORECTAL CA SCREEN DOC REV: CPT | Performed by: NEUROLOGICAL SURGERY

## 2024-11-26 PROCEDURE — 3080F DIAST BP >= 90 MM HG: CPT | Performed by: NEUROLOGICAL SURGERY

## 2024-11-26 PROCEDURE — G8399 PT W/DXA RESULTS DOCUMENT: HCPCS | Performed by: NEUROLOGICAL SURGERY

## 2024-11-26 PROCEDURE — 1090F PRES/ABSN URINE INCON ASSESS: CPT | Performed by: NEUROLOGICAL SURGERY

## 2024-11-26 PROCEDURE — G8484 FLU IMMUNIZE NO ADMIN: HCPCS | Performed by: NEUROLOGICAL SURGERY

## 2024-11-26 PROCEDURE — 1159F MED LIST DOCD IN RCRD: CPT | Performed by: NEUROLOGICAL SURGERY

## 2024-11-26 PROCEDURE — 3077F SYST BP >= 140 MM HG: CPT | Performed by: NEUROLOGICAL SURGERY

## 2024-11-26 PROCEDURE — 1123F ACP DISCUSS/DSCN MKR DOCD: CPT | Performed by: NEUROLOGICAL SURGERY

## 2024-11-26 PROCEDURE — G8427 DOCREV CUR MEDS BY ELIG CLIN: HCPCS | Performed by: NEUROLOGICAL SURGERY

## 2024-11-26 PROCEDURE — 99214 OFFICE O/P EST MOD 30 MIN: CPT | Performed by: NEUROLOGICAL SURGERY

## 2024-11-26 PROCEDURE — G8417 CALC BMI ABV UP PARAM F/U: HCPCS | Performed by: NEUROLOGICAL SURGERY

## 2024-11-26 PROCEDURE — 1036F TOBACCO NON-USER: CPT | Performed by: NEUROLOGICAL SURGERY

## 2024-11-26 NOTE — PROGRESS NOTES
Department of Neurosurgery                                                      Follow up visit      History Obtained From:  patient, spouse    CHIEF COMPLAINT:         Chief Complaint   Patient presents with    Follow-up       HISTORY OF PRESENT ILLNESS:       The patient is a 74 y.o. female who presents for follow up for S/P cervical spinal fusion for correction of chin neck deformity (07/26/2023), gait instability, and ataxia.    Patient reports that her gait has worsened and that she is now unable to walk without a walker. She presents to today's visit utilizing a wheelchair to assist with ambulation. She complains of significant stiffness in the neck, left worse than right. Criselda notes as well that her left hand will become frequently numb and weak and she will have difficulty grasping things. She also reports that her voice is deeper, raspier, and more hoarse. She complains of dysphagia and will have frequent difficulties swallowing food. Swallow evaluation was negative for any acute findings.    Her  notes that she had a TIA on 10/21/2024 and says that since this time her condition has overall worsened. Prior to her TIA she was doing PT with balance and mobility and was ambulating with a walker. During her TIA she had slurred speech and altered mental status. She admits to having significant brain fog for a couple weeks following this incident. She feels that following the TIA her weakness was exacerbated and her overall ability to ambulate is worse.    She feels that her whole body is quite weak. Even with assistance she does not feel that she is able to stand or walk. She has been discharged from [place] long term care facility and is now living at home with her . Her  does admit that he is having difficulty caring for her. She does have twice weekly home aid visits. She notes that she will be having [place] coming to her house to assist with home therapy.    PAST MEDICAL

## 2024-12-02 PROBLEM — F48.2 PSEUDOBULBAR AFFECT: Status: ACTIVE | Noted: 2024-12-02

## 2024-12-02 RX ORDER — DEXTROMETHORPHAN HYDROBROMIDE AND QUINIDINE SULFATE 20; 10 MG/1; MG/1
1 CAPSULE, GELATIN COATED ORAL DAILY
Qty: 60 CAPSULE | Refills: 2 | Status: SHIPPED | OUTPATIENT
Start: 2024-12-02

## 2024-12-02 RX ORDER — DEXTROMETHORPHAN HYDROBROMIDE AND QUINIDINE SULFATE 20; 10 MG/1; MG/1
1 CAPSULE, GELATIN COATED ORAL DAILY
Qty: 60 CAPSULE | Refills: 2 | Status: SHIPPED | OUTPATIENT
Start: 2024-12-02 | End: 2024-12-02

## 2024-12-03 ENCOUNTER — TELEPHONE (OUTPATIENT)
Dept: NEUROSURGERY | Age: 74
End: 2024-12-03

## 2024-12-03 NOTE — TELEPHONE ENCOUNTER
Prior authorization approved  Payer: [employer] - Medicare Case ID: [id number]    [phone]    [phone]  Note from payer: Request Reference Number: [id number]. NUEDEXTA CAP 20-10MG is approved through 12/31/2025. Your patient may now fill this prescription and it will be covered.  Approval Details    Authorized from December 3, 2024 to December 31, 2025

## 2024-12-05 ENCOUNTER — TELEPHONE (OUTPATIENT)
Dept: INTERNAL MEDICINE CLINIC | Age: 74
End: 2024-12-05

## 2024-12-05 DIAGNOSIS — M25.572 ACUTE LEFT ANKLE PAIN: Primary | ICD-10-CM

## 2024-12-05 NOTE — TELEPHONE ENCOUNTER
[facility] had a fall this morning 5;45am , sitting at the edge of bed and lowered the nahum , and  controlled landing to floor     The only injury twied left ankle , but no injury of bruising or redness/

## 2024-12-09 NOTE — TELEPHONE ENCOUNTER
Patient returned call informed that Physician ordered X Ray and asked if she hit her head and she answered no.  Patient also refused to complete X Ray informing that is doing good.

## 2024-12-13 ENCOUNTER — TELEPHONE (OUTPATIENT)
Dept: INTERNAL MEDICINE CLINIC | Age: 74
End: 2024-12-13

## 2024-12-13 NOTE — TELEPHONE ENCOUNTER
----- Message from Jean Claude MORENO sent at 12/12/2024  4:14 PM EST -----  Regarding: [facility] Appointment Request  E[facility] Appointment Request    Patient needs appointment for [facility] Appointment Type: Existing Condition Follow Up.    Patient Requested Dates(s):next week   Patient Requested Time:afternoon   Provider Name:zo eason     Reason for Appointment Request: Established Patient - No appointments available during search    Follow up appointment for her kidney   --------------------------------------------------------------------------------------------------------------------------    Relationship to Patient: Self     Call Back Information: OK to leave message on voicemail  Preferred Call Back Number: Phone [phone]

## 2024-12-23 DIAGNOSIS — F48.2 PSEUDOBULBAR AFFECT: ICD-10-CM

## 2024-12-23 NOTE — TELEPHONE ENCOUNTER
Patient states the Nuedexta costs approximately $150 after insurance and she can not afford it. She is asking it to be resent to the [facility] pharmacy so they will pay for it. Order pended.

## 2024-12-24 RX ORDER — DEXTROMETHORPHAN HYDROBROMIDE AND QUINIDINE SULFATE 20; 10 MG/1; MG/1
1 CAPSULE, GELATIN COATED ORAL DAILY
Qty: 60 CAPSULE | Refills: 2 | Status: SHIPPED | OUTPATIENT
Start: 2024-12-24

## 2024-12-31 DIAGNOSIS — F48.2 PSEUDOBULBAR AFFECT: ICD-10-CM

## 2025-01-09 RX ORDER — DEXTROMETHORPHAN HYDROBROMIDE AND QUINIDINE SULFATE 20; 10 MG/1; MG/1
CAPSULE, GELATIN COATED ORAL
Qty: 60 CAPSULE | Refills: 3 | OUTPATIENT
Start: 2025-01-09

## 2025-01-21 ENCOUNTER — HOSPITAL ENCOUNTER (INPATIENT)
Age: 75
LOS: 9 days | Discharge: NURSING, REHAB OR LONG-TERM CARE | DRG: 690 | End: 2025-01-30
Attending: STUDENT IN AN ORGANIZED HEALTH CARE EDUCATION/TRAINING PROGRAM
Payer: MEDICARE

## 2025-01-21 DIAGNOSIS — B96.29 UTI DUE TO EXTENDED-SPECTRUM BETA LACTAMASE (ESBL) PRODUCING ESCHERICHIA COLI: Primary | ICD-10-CM

## 2025-01-21 DIAGNOSIS — Z16.12 UTI DUE TO EXTENDED-SPECTRUM BETA LACTAMASE (ESBL) PRODUCING ESCHERICHIA COLI: Primary | ICD-10-CM

## 2025-01-21 DIAGNOSIS — N39.0 UTI DUE TO EXTENDED-SPECTRUM BETA LACTAMASE (ESBL) PRODUCING ESCHERICHIA COLI: Primary | ICD-10-CM

## 2025-01-21 LAB
ANION GAP SERPL CALCULATED.3IONS-SCNC: 12 MMOL/L (ref 9–16)
BASOPHILS # BLD: 0.1 K/UL (ref 0–0.2)
BASOPHILS NFR BLD: 1 % (ref 0–2)
BNP SERPL-MCNC: 89 PG/ML (ref 0–300)
BUN SERPL-MCNC: 20 MG/DL (ref 8–23)
CALCIUM SERPL-MCNC: 9.1 MG/DL (ref 8.6–10.4)
CHLORIDE SERPL-SCNC: 109 MMOL/L (ref 98–107)
CO2 SERPL-SCNC: 24 MMOL/L (ref 20–31)
CREAT SERPL-MCNC: 0.8 MG/DL (ref 0.7–1.2)
EOSINOPHIL # BLD: 0.4 K/UL (ref 0–0.4)
EOSINOPHILS RELATIVE PERCENT: 6 % (ref 0–4)
ERYTHROCYTE [DISTWIDTH] IN BLOOD BY AUTOMATED COUNT: 13.9 % (ref 11.5–14.9)
GFR, ESTIMATED: 77 ML/MIN/1.73M2
GLUCOSE SERPL-MCNC: 133 MG/DL (ref 74–99)
HCT VFR BLD AUTO: 39.6 % (ref 36–46)
HGB BLD-MCNC: 13.1 G/DL (ref 12–16)
LYMPHOCYTES NFR BLD: 1.7 K/UL (ref 1–4.8)
LYMPHOCYTES RELATIVE PERCENT: 26 % (ref 24–44)
MCH RBC QN AUTO: 31.8 PG (ref 26–34)
MCHC RBC AUTO-ENTMCNC: 33.2 G/DL (ref 31–37)
MCV RBC AUTO: 95.8 FL (ref 80–100)
MONOCYTES NFR BLD: 0.6 K/UL (ref 0.1–1.3)
MONOCYTES NFR BLD: 10 % (ref 1–7)
NEUTROPHILS NFR BLD: 57 % (ref 36–66)
NEUTS SEG NFR BLD: 3.7 K/UL (ref 1.3–9.1)
PLATELET # BLD AUTO: 236 K/UL (ref 150–450)
PMV BLD AUTO: 7.3 FL (ref 6–12)
POTASSIUM SERPL-SCNC: 3.8 MMOL/L (ref 3.7–5.3)
RBC # BLD AUTO: 4.13 M/UL (ref 4–5.2)
SODIUM SERPL-SCNC: 145 MMOL/L (ref 136–145)
WBC OTHER # BLD: 6.5 K/UL (ref 3.5–11)

## 2025-01-21 PROCEDURE — 6370000000 HC RX 637 (ALT 250 FOR IP)

## 2025-01-21 PROCEDURE — 85025 COMPLETE CBC W/AUTO DIFF WBC: CPT

## 2025-01-21 PROCEDURE — 36415 COLL VENOUS BLD VENIPUNCTURE: CPT

## 2025-01-21 PROCEDURE — 2580000003 HC RX 258: Performed by: STUDENT IN AN ORGANIZED HEALTH CARE EDUCATION/TRAINING PROGRAM

## 2025-01-21 PROCEDURE — 99285 EMERGENCY DEPT VISIT HI MDM: CPT

## 2025-01-21 PROCEDURE — 1200000000 HC SEMI PRIVATE

## 2025-01-21 PROCEDURE — 96365 THER/PROPH/DIAG IV INF INIT: CPT

## 2025-01-21 PROCEDURE — 2500000003 HC RX 250 WO HCPCS

## 2025-01-21 PROCEDURE — 80048 BASIC METABOLIC PNL TOTAL CA: CPT

## 2025-01-21 PROCEDURE — 83880 ASSAY OF NATRIURETIC PEPTIDE: CPT

## 2025-01-21 PROCEDURE — 6360000002 HC RX W HCPCS: Performed by: STUDENT IN AN ORGANIZED HEALTH CARE EDUCATION/TRAINING PROGRAM

## 2025-01-21 RX ORDER — BUSPIRONE HYDROCHLORIDE 5 MG/1
5 TABLET ORAL 3 TIMES DAILY
Status: DISCONTINUED | OUTPATIENT
Start: 2025-01-21 | End: 2025-01-30 | Stop reason: HOSPADM

## 2025-01-21 RX ORDER — CARVEDILOL 25 MG/1
25 TABLET ORAL 2 TIMES DAILY WITH MEALS
Status: DISCONTINUED | OUTPATIENT
Start: 2025-01-21 | End: 2025-01-30 | Stop reason: HOSPADM

## 2025-01-21 RX ORDER — AMLODIPINE BESYLATE 10 MG/1
10 TABLET ORAL DAILY
Status: DISCONTINUED | OUTPATIENT
Start: 2025-01-21 | End: 2025-01-30 | Stop reason: HOSPADM

## 2025-01-21 RX ORDER — BACLOFEN 10 MG/1
10 TABLET ORAL 2 TIMES DAILY
Qty: 90 TABLET | Refills: 3 | Status: ON HOLD | OUTPATIENT
Start: 2025-01-21

## 2025-01-21 RX ORDER — POTASSIUM CHLORIDE 1500 MG/1
20 TABLET, EXTENDED RELEASE ORAL DAILY
Status: DISCONTINUED | OUTPATIENT
Start: 2025-01-21 | End: 2025-01-30 | Stop reason: HOSPADM

## 2025-01-21 RX ORDER — ACETAMINOPHEN 325 MG/1
650 TABLET ORAL EVERY 6 HOURS PRN
Status: DISCONTINUED | OUTPATIENT
Start: 2025-01-21 | End: 2025-01-30 | Stop reason: HOSPADM

## 2025-01-21 RX ORDER — ONDANSETRON 2 MG/ML
4 INJECTION INTRAMUSCULAR; INTRAVENOUS EVERY 6 HOURS PRN
Status: DISCONTINUED | OUTPATIENT
Start: 2025-01-21 | End: 2025-01-30 | Stop reason: HOSPADM

## 2025-01-21 RX ORDER — HYDRALAZINE HYDROCHLORIDE 25 MG/1
25 TABLET, FILM COATED ORAL 3 TIMES DAILY
Status: DISCONTINUED | OUTPATIENT
Start: 2025-01-21 | End: 2025-01-30 | Stop reason: HOSPADM

## 2025-01-21 RX ORDER — ONDANSETRON 4 MG/1
4 TABLET, ORALLY DISINTEGRATING ORAL EVERY 8 HOURS PRN
Status: DISCONTINUED | OUTPATIENT
Start: 2025-01-21 | End: 2025-01-30 | Stop reason: HOSPADM

## 2025-01-21 RX ORDER — SODIUM CHLORIDE 0.9 % (FLUSH) 0.9 %
5-40 SYRINGE (ML) INJECTION EVERY 12 HOURS SCHEDULED
Status: DISCONTINUED | OUTPATIENT
Start: 2025-01-21 | End: 2025-01-30 | Stop reason: HOSPADM

## 2025-01-21 RX ORDER — MAGNESIUM SULFATE HEPTAHYDRATE 40 MG/ML
2000 INJECTION, SOLUTION INTRAVENOUS PRN
Status: DISCONTINUED | OUTPATIENT
Start: 2025-01-21 | End: 2025-01-30 | Stop reason: HOSPADM

## 2025-01-21 RX ORDER — ASPIRIN 81 MG/1
81 TABLET ORAL DAILY
Status: DISCONTINUED | OUTPATIENT
Start: 2025-01-21 | End: 2025-01-30 | Stop reason: HOSPADM

## 2025-01-21 RX ORDER — ATORVASTATIN CALCIUM 80 MG/1
80 TABLET, FILM COATED ORAL DAILY
Status: DISCONTINUED | OUTPATIENT
Start: 2025-01-21 | End: 2025-01-30 | Stop reason: HOSPADM

## 2025-01-21 RX ORDER — POTASSIUM CHLORIDE 1500 MG/1
40 TABLET, EXTENDED RELEASE ORAL PRN
Status: DISCONTINUED | OUTPATIENT
Start: 2025-01-21 | End: 2025-01-30 | Stop reason: HOSPADM

## 2025-01-21 RX ORDER — POLYETHYLENE GLYCOL 3350 17 G/17G
17 POWDER, FOR SOLUTION ORAL DAILY PRN
Status: DISCONTINUED | OUTPATIENT
Start: 2025-01-21 | End: 2025-01-30 | Stop reason: HOSPADM

## 2025-01-21 RX ORDER — SODIUM CHLORIDE 9 MG/ML
INJECTION, SOLUTION INTRAVENOUS PRN
Status: DISCONTINUED | OUTPATIENT
Start: 2025-01-21 | End: 2025-01-30 | Stop reason: HOSPADM

## 2025-01-21 RX ORDER — SODIUM CHLORIDE 0.9 % (FLUSH) 0.9 %
10 SYRINGE (ML) INJECTION PRN
Status: DISCONTINUED | OUTPATIENT
Start: 2025-01-21 | End: 2025-01-30 | Stop reason: HOSPADM

## 2025-01-21 RX ORDER — BACLOFEN 10 MG/1
10 TABLET ORAL 2 TIMES DAILY
Status: DISCONTINUED | OUTPATIENT
Start: 2025-01-21 | End: 2025-01-30 | Stop reason: HOSPADM

## 2025-01-21 RX ORDER — GABAPENTIN 100 MG/1
200 CAPSULE ORAL 2 TIMES DAILY
Status: DISCONTINUED | OUTPATIENT
Start: 2025-01-21 | End: 2025-01-30 | Stop reason: HOSPADM

## 2025-01-21 RX ORDER — BISACODYL 10 MG
10 SUPPOSITORY, RECTAL RECTAL DAILY PRN
Status: DISCONTINUED | OUTPATIENT
Start: 2025-01-21 | End: 2025-01-30 | Stop reason: HOSPADM

## 2025-01-21 RX ORDER — POTASSIUM CHLORIDE 7.45 MG/ML
10 INJECTION INTRAVENOUS PRN
Status: DISCONTINUED | OUTPATIENT
Start: 2025-01-21 | End: 2025-01-30 | Stop reason: HOSPADM

## 2025-01-21 RX ORDER — GABAPENTIN 100 MG/1
200 CAPSULE ORAL 2 TIMES DAILY
Qty: 90 CAPSULE | Refills: 3 | Status: ON HOLD | OUTPATIENT
Start: 2025-01-21 | End: 2025-04-21

## 2025-01-21 RX ORDER — FUROSEMIDE 40 MG/1
40 TABLET ORAL DAILY
Status: DISCONTINUED | OUTPATIENT
Start: 2025-01-21 | End: 2025-01-30 | Stop reason: HOSPADM

## 2025-01-21 RX ORDER — ACETAMINOPHEN 650 MG/1
650 SUPPOSITORY RECTAL EVERY 6 HOURS PRN
Status: DISCONTINUED | OUTPATIENT
Start: 2025-01-21 | End: 2025-01-30 | Stop reason: HOSPADM

## 2025-01-21 RX ORDER — CALCIUM CARBONATE/VITAMIN D3 600 MG-10
1 TABLET ORAL DAILY
Status: DISCONTINUED | OUTPATIENT
Start: 2025-01-21 | End: 2025-01-30 | Stop reason: HOSPADM

## 2025-01-21 RX ADMIN — BACLOFEN 10 MG: 10 TABLET ORAL at 21:33

## 2025-01-21 RX ADMIN — HYDRALAZINE HYDROCHLORIDE 25 MG: 25 TABLET ORAL at 21:33

## 2025-01-21 RX ADMIN — SERTRALINE HYDROCHLORIDE 25 MG: 50 TABLET ORAL at 21:33

## 2025-01-21 RX ADMIN — MICONAZOLE NITRATE: 2 POWDER TOPICAL at 21:00

## 2025-01-21 RX ADMIN — Medication 6 MG: at 22:42

## 2025-01-21 RX ADMIN — BUSPIRONE HYDROCHLORIDE 5 MG: 5 TABLET ORAL at 21:33

## 2025-01-21 RX ADMIN — SODIUM CHLORIDE, PRESERVATIVE FREE 10 ML: 5 INJECTION INTRAVENOUS at 21:33

## 2025-01-21 RX ADMIN — MEROPENEM 1000 MG: 1 INJECTION INTRAVENOUS at 17:52

## 2025-01-21 RX ADMIN — GABAPENTIN 200 MG: 100 CAPSULE ORAL at 21:33

## 2025-01-21 RX ADMIN — APIXABAN 5 MG: 5 TABLET, FILM COATED ORAL at 21:33

## 2025-01-21 RX ADMIN — CARVEDILOL 25 MG: 25 TABLET, FILM COATED ORAL at 21:33

## 2025-01-21 ASSESSMENT — PAIN DESCRIPTION - LOCATION: LOCATION: SHOULDER

## 2025-01-21 ASSESSMENT — PAIN DESCRIPTION - ORIENTATION: ORIENTATION: LEFT

## 2025-01-21 ASSESSMENT — PAIN DESCRIPTION - DESCRIPTORS: DESCRIPTORS: ACHING;DISCOMFORT

## 2025-01-21 ASSESSMENT — PAIN SCALES - GENERAL: PAINLEVEL_OUTOF10: 5

## 2025-01-21 NOTE — TELEPHONE ENCOUNTER
Patient called in request to refill Gabapentin and Baclofen to [facility]    Last office visit 8/1/2024

## 2025-01-21 NOTE — ED PROVIDER NOTES
Running Out of Food in the Last Year: Never true     Ran Out of Food in the Last Year: Never true   Transportation Needs: Unmet Transportation Needs (10/22/2024)    PRAPARE - Transportation     Lack of Transportation (Medical): Yes     Lack of Transportation (Non-Medical): Yes   Physical Activity: Inactive (8/22/2022)    Exercise Vital Sign     Days of Exercise per Week: 0 days     Minutes of Exercise per Session: 0 min   Stress: Not on file   Social Connections: Unknown (2/11/2020)    Social Connection and Isolation Panel [NHANES]     Frequency of Communication with Friends and Family: Patient declined     Frequency of Social Gatherings with Friends and Family: Patient declined     Attends Latter day Services: Patient declined     Active Member of Clubs or Organizations: Patient declined     Attends Club or Organization Meetings: Patient declined     Marital Status: Patient declined   Intimate Partner Violence: Unknown (2/22/2024)    Received from [facility], [facility]    [place] Safety & Environment     Fear of Current or Ex-Partner: Not on file     Emotionally Abused: Not on file     Physically Abused: Not on file     Sexually Abused: Not on file     Physically or Sexually Abused: Not on file   Housing Stability: Low Risk  (10/22/2024)    Housing Stability Vital Sign     Unable to Pay for Housing in the Last Year: No     Number of Times Moved in the Last Year: 0     Homeless in the Last Year: No       Family History   Problem Relation Age of Onset    Stroke Mother     Diabetes Mother     Heart Disease Mother     High Blood Pressure Mother     Heart Disease Father     Heart Disease Brother     High Blood Pressure Brother     Heart Disease Maternal Grandmother     High Blood Pressure Sister        Allergies:    Fentanyl, Bactrim [sulfamethoxazole-trimethoprim], Cat dander, Codeine, Diazepam, Meperidine hcl, and Seasonal    Home Medications:  Prior to Admission medications    Medication Sig  Rules, Testing considered, external document review, independent imaging review:  Proteus was sensitive to Zosyn, Bactrim, and ertapenem.  She is unable to tolerate Bactrim.  I am worried that since has been ongoing as long as it had him worried that the Proteus may have become resistant to the Zosyn as it is already resistant to Unasyn and ampicillin.  For that reason, started patient on meropenem.  Internal medicine can downgrade as needed.      Diagnoses Considered but Do Not Suspect: Appendicitis        3)  Treatment and Disposition          Patient repeat assessment, shared decision making, and disposition discussion: Spoke to internal medicine, Betsey ESTRADA who accepted patient to her service.  Patient not meeting sepsis criteria      MIPS:  [None]    Social determinants of health impacting treatment or disposition:  none    Code Status Discussion:  Full Code      PROCEDURES:  none    CONSULTS:  IP CONSULT TO INTERNAL MEDICINE    CRITICAL CARE:  There was a high probability of clinically significant/life threatening deterioration in this patient's condition which required my urgent intervention.  Total critical care time was 0 minutes.  This excludes any time for separately reportable procedures.     FINAL IMPRESSION     1. UTI due to extended-spectrum beta lactamase (ESBL) producing Escherichia coli          DISPOSITION / PLAN   DISPOSITION Decision To Admit 01/21/2025 05:42:50 PM   DISPOSITION CONDITION Stable             PATIENT REFERRED TO:  No follow-up provider specified.    DISCHARGE MEDICATIONS:  New Prescriptions    No medications on file       Remy Beckman DO  Emergency Medicine Attending    (Please note that portions of this note were completed with a voice recognition program.  Efforts were made to edit the dictations but occasionally words are mis-transcribed.)          Remy Beckman DO  01/21/25 1920

## 2025-01-22 PROBLEM — B96.29 UTI DUE TO EXTENDED-SPECTRUM BETA LACTAMASE (ESBL) PRODUCING ESCHERICHIA COLI: Status: ACTIVE | Noted: 2025-01-21

## 2025-01-22 PROBLEM — Z16.12 UTI DUE TO EXTENDED-SPECTRUM BETA LACTAMASE (ESBL) PRODUCING ESCHERICHIA COLI: Status: ACTIVE | Noted: 2025-01-21

## 2025-01-22 LAB
ANION GAP SERPL CALCULATED.3IONS-SCNC: 12 MMOL/L (ref 9–16)
BACTERIA URNS QL MICRO: ABNORMAL
BASOPHILS # BLD: 0.1 K/UL (ref 0–0.2)
BASOPHILS NFR BLD: 1 % (ref 0–2)
BILIRUB UR QL STRIP: NEGATIVE
BUN SERPL-MCNC: 18 MG/DL (ref 8–23)
CALCIUM SERPL-MCNC: 8.1 MG/DL (ref 8.6–10.4)
CASTS #/AREA URNS LPF: ABNORMAL /LPF
CHLORIDE SERPL-SCNC: 108 MMOL/L (ref 98–107)
CLARITY UR: CLEAR
CO2 SERPL-SCNC: 21 MMOL/L (ref 20–31)
COLOR UR: YELLOW
CREAT SERPL-MCNC: 0.7 MG/DL (ref 0.7–1.2)
EOSINOPHIL # BLD: 0.6 K/UL (ref 0–0.4)
EOSINOPHILS RELATIVE PERCENT: 9 % (ref 0–4)
EPI CELLS #/AREA URNS HPF: ABNORMAL /HPF
ERYTHROCYTE [DISTWIDTH] IN BLOOD BY AUTOMATED COUNT: 14 % (ref 11.5–14.9)
GFR, ESTIMATED: >90 ML/MIN/1.73M2
GLUCOSE SERPL-MCNC: 118 MG/DL (ref 74–99)
GLUCOSE UR STRIP-MCNC: NEGATIVE MG/DL
HCT VFR BLD AUTO: 35.7 % (ref 36–46)
HGB BLD-MCNC: 11.8 G/DL (ref 12–16)
HGB UR QL STRIP.AUTO: NEGATIVE
KETONES UR STRIP-MCNC: NEGATIVE MG/DL
LEUKOCYTE ESTERASE UR QL STRIP: ABNORMAL
LYMPHOCYTES NFR BLD: 1.8 K/UL (ref 1–4.8)
LYMPHOCYTES RELATIVE PERCENT: 26 % (ref 24–44)
MCH RBC QN AUTO: 32 PG (ref 26–34)
MCHC RBC AUTO-ENTMCNC: 33.2 G/DL (ref 31–37)
MCV RBC AUTO: 96.4 FL (ref 80–100)
MONOCYTES NFR BLD: 0.7 K/UL (ref 0.1–1.3)
MONOCYTES NFR BLD: 10 % (ref 1–7)
NEUTROPHILS NFR BLD: 54 % (ref 36–66)
NEUTS SEG NFR BLD: 3.7 K/UL (ref 1.3–9.1)
NITRITE UR QL STRIP: NEGATIVE
PH UR STRIP: 5 [PH] (ref 5–8)
PLATELET # BLD AUTO: 216 K/UL (ref 150–450)
PMV BLD AUTO: 7.9 FL (ref 6–12)
POTASSIUM SERPL-SCNC: 3.9 MMOL/L (ref 3.7–5.3)
PROT UR STRIP-MCNC: NEGATIVE MG/DL
RBC # BLD AUTO: 3.7 M/UL (ref 4–5.2)
RBC #/AREA URNS HPF: ABNORMAL /HPF
SODIUM SERPL-SCNC: 141 MMOL/L (ref 136–145)
SP GR UR STRIP: 1.01 (ref 1–1.03)
UROBILINOGEN UR STRIP-ACNC: NORMAL EU/DL (ref 0–1)
WBC #/AREA URNS HPF: ABNORMAL /HPF
WBC OTHER # BLD: 6.9 K/UL (ref 3.5–11)

## 2025-01-22 PROCEDURE — 6370000000 HC RX 637 (ALT 250 FOR IP): Performed by: INTERNAL MEDICINE

## 2025-01-22 PROCEDURE — 36415 COLL VENOUS BLD VENIPUNCTURE: CPT

## 2025-01-22 PROCEDURE — 6370000000 HC RX 637 (ALT 250 FOR IP)

## 2025-01-22 PROCEDURE — 80048 BASIC METABOLIC PNL TOTAL CA: CPT

## 2025-01-22 PROCEDURE — 87086 URINE CULTURE/COLONY COUNT: CPT

## 2025-01-22 PROCEDURE — G0545 PR INHERENT VISIT TO INPT: HCPCS | Performed by: INTERNAL MEDICINE

## 2025-01-22 PROCEDURE — 81001 URINALYSIS AUTO W/SCOPE: CPT

## 2025-01-22 PROCEDURE — 99223 1ST HOSP IP/OBS HIGH 75: CPT

## 2025-01-22 PROCEDURE — 1200000000 HC SEMI PRIVATE

## 2025-01-22 PROCEDURE — 99222 1ST HOSP IP/OBS MODERATE 55: CPT | Performed by: INTERNAL MEDICINE

## 2025-01-22 PROCEDURE — 2500000003 HC RX 250 WO HCPCS

## 2025-01-22 PROCEDURE — 2580000003 HC RX 258

## 2025-01-22 PROCEDURE — 6360000002 HC RX W HCPCS

## 2025-01-22 PROCEDURE — 85025 COMPLETE CBC W/AUTO DIFF WBC: CPT

## 2025-01-22 RX ORDER — VANCOMYCIN HYDROCHLORIDE 50 MG/ML
125 KIT ORAL DAILY
Status: DISCONTINUED | OUTPATIENT
Start: 2025-01-22 | End: 2025-01-23

## 2025-01-22 RX ADMIN — MEROPENEM 1000 MG: 1 INJECTION INTRAVENOUS at 18:35

## 2025-01-22 RX ADMIN — Medication 6 MG: at 21:13

## 2025-01-22 RX ADMIN — FUROSEMIDE 40 MG: 40 TABLET ORAL at 08:04

## 2025-01-22 RX ADMIN — HYDRALAZINE HYDROCHLORIDE 25 MG: 25 TABLET ORAL at 14:56

## 2025-01-22 RX ADMIN — SERTRALINE HYDROCHLORIDE 25 MG: 50 TABLET ORAL at 21:13

## 2025-01-22 RX ADMIN — MEROPENEM 1000 MG: 1 INJECTION INTRAVENOUS at 01:57

## 2025-01-22 RX ADMIN — BACLOFEN 10 MG: 10 TABLET ORAL at 21:13

## 2025-01-22 RX ADMIN — VANCOMYCIN HYDROCHLORIDE 125 MG: KIT at 18:38

## 2025-01-22 RX ADMIN — MEROPENEM 1000 MG: 1 INJECTION INTRAVENOUS at 09:59

## 2025-01-22 RX ADMIN — GABAPENTIN 200 MG: 100 CAPSULE ORAL at 09:55

## 2025-01-22 RX ADMIN — MICONAZOLE NITRATE: 2 POWDER TOPICAL at 09:55

## 2025-01-22 RX ADMIN — CARVEDILOL 25 MG: 25 TABLET, FILM COATED ORAL at 18:30

## 2025-01-22 RX ADMIN — HYDRALAZINE HYDROCHLORIDE 25 MG: 25 TABLET ORAL at 21:13

## 2025-01-22 RX ADMIN — BUSPIRONE HYDROCHLORIDE 5 MG: 5 TABLET ORAL at 08:03

## 2025-01-22 RX ADMIN — AMLODIPINE BESYLATE 10 MG: 10 TABLET ORAL at 08:03

## 2025-01-22 RX ADMIN — SERTRALINE HYDROCHLORIDE 25 MG: 50 TABLET ORAL at 14:56

## 2025-01-22 RX ADMIN — APIXABAN 5 MG: 5 TABLET, FILM COATED ORAL at 21:12

## 2025-01-22 RX ADMIN — BUSPIRONE HYDROCHLORIDE 5 MG: 5 TABLET ORAL at 14:56

## 2025-01-22 RX ADMIN — ACETAMINOPHEN 650 MG: 325 TABLET ORAL at 11:24

## 2025-01-22 RX ADMIN — ATORVASTATIN CALCIUM 80 MG: 80 TABLET, FILM COATED ORAL at 08:03

## 2025-01-22 RX ADMIN — GABAPENTIN 200 MG: 100 CAPSULE ORAL at 21:12

## 2025-01-22 RX ADMIN — SERTRALINE HYDROCHLORIDE 25 MG: 50 TABLET ORAL at 08:03

## 2025-01-22 RX ADMIN — APIXABAN 5 MG: 5 TABLET, FILM COATED ORAL at 09:55

## 2025-01-22 RX ADMIN — BACLOFEN 10 MG: 10 TABLET ORAL at 08:04

## 2025-01-22 RX ADMIN — ASPIRIN 81 MG: 81 TABLET, COATED ORAL at 08:04

## 2025-01-22 RX ADMIN — POTASSIUM CHLORIDE 20 MEQ: 1500 TABLET, EXTENDED RELEASE ORAL at 08:04

## 2025-01-22 RX ADMIN — CARVEDILOL 25 MG: 25 TABLET, FILM COATED ORAL at 08:03

## 2025-01-22 RX ADMIN — MICONAZOLE NITRATE: 2 POWDER TOPICAL at 21:14

## 2025-01-22 RX ADMIN — SODIUM CHLORIDE, PRESERVATIVE FREE 10 ML: 5 INJECTION INTRAVENOUS at 10:00

## 2025-01-22 RX ADMIN — HYDRALAZINE HYDROCHLORIDE 25 MG: 25 TABLET ORAL at 08:04

## 2025-01-22 RX ADMIN — CALCIUM CARBONATE 600 MG (1,500 MG)-VITAMIN D3 400 UNIT TABLET 1 TABLET: at 08:03

## 2025-01-22 RX ADMIN — BUSPIRONE HYDROCHLORIDE 5 MG: 5 TABLET ORAL at 21:12

## 2025-01-22 ASSESSMENT — PAIN DESCRIPTION - LOCATION
LOCATION: SHOULDER
LOCATION: NECK
LOCATION: SHOULDER

## 2025-01-22 ASSESSMENT — PAIN SCALES - GENERAL
PAINLEVEL_OUTOF10: 6
PAINLEVEL_OUTOF10: 0
PAINLEVEL_OUTOF10: 8

## 2025-01-22 ASSESSMENT — PAIN DESCRIPTION - ORIENTATION
ORIENTATION: POSTERIOR
ORIENTATION: LEFT
ORIENTATION: LEFT

## 2025-01-22 ASSESSMENT — PAIN DESCRIPTION - DESCRIPTORS
DESCRIPTORS: ACHING
DESCRIPTORS: DISCOMFORT
DESCRIPTORS: ACHING

## 2025-01-22 NOTE — CARE COORDINATION
Case Management Assessment  Initial Evaluation    Date/Time of Evaluation: 1/22/2025 10:44AM  Assessment Completed by: Lesly Jackson RN    If patient is discharged prior to next notation, then this note serves as note for discharge by case management.    Patient Name: Criselda Tinoco                   YOB: 1950  Diagnosis: Urinary tract infection [N39.0]  UTI due to extended-spectrum beta lactamase (ESBL) producing Escherichia coli [N39.0, B96.29, Z16.12]                   Date / Time: 1/21/2025  5:00 PM    Patient Admission Status: Inpatient   Readmission Risk (Low < 19, Mod (19-27), High > 27): Readmission Risk Score: 17.4    Current PCP: Wood St MD  PCP verified by CM? Yes    Chart Reviewed: Yes      History Provided by: Patient  Patient Orientation: Alert and Oriented    Patient Cognition: Alert    Hospitalization in the last 30 days (Readmission):  No    If yes, Readmission Assessment in  Navigator will be completed.    Advance Directives:      Code Status: Full Code   Patient's Primary Decision Maker is: Legal Next of Kin    Primary Decision Maker: True Tinoco - Spouse - [phone]    Secondary Decision Maker: Lesly Clark - Child - [phone]    Discharge Planning:    Patient lives with: Spouse/Significant Other Type of Home: Apartment  Primary Care Giver: Spouse  Patient Support Systems include: Spouse/Significant Other, Family Members, Children   Current Financial resources: Medicare  Current community resources: ECF/Home Care  Current services prior to admission: Home Care, Durable Medical Equipment            Current DME: Walker, Shower Chair, Wheelchair, Cane            Type of Home Care services:  OT, PT, Nursing Services, Aide Services    ADLS  Prior functional level: Assistance with the following:, Shopping, Housework, Cooking, Toileting, Dressing, Bathing, Mobility  Current functional level: Assistance with the following:, Cooking, Housework, Shopping, Dressing,

## 2025-01-22 NOTE — PROGRESS NOTES
Writer called [facility] for Urine Culture/Sensitivity results.  Awaiting faxed results.

## 2025-01-22 NOTE — PROGRESS NOTES
Spiritual Health History and Assessment/Progress Note  [facility]    (P) Initial Encounter      Name: Criselda Tinoco MRN: [id number]    Age: 75 y.o.     Sex: female   Language: English   Islam: Hoahaoism   Urinary tract infection     Date: 1/22/2025            Total Time Calculated: (P) 10 min              Spiritual Assessment began in [facility] MED SUR[facility] for cultural consult   Referral/Consult From: (P) Nurse   Encounter Overview/Reason: (P) Initial Encounter  Service Provided For: (P) Patient    Lore, Belief, Meaning:   Patient identifies as spiritual and is connected with a lore tradition or spiritual practice but patient states she does not attend Latter-day anymore.  She states that she is a believer.  Family/Friends No family/friends present      Importance and Influence:  Patient has spiritual/personal beliefs that influence decisions regarding their health  Family/Friends No family/friends present    Community:  Patient Other: Patient shares she does not attend Latter-day anymore because when her mother was ill and passed away, no one from the [employer] reached out to her.  She felt slighted and isolated.  Family/Friends No family/friends present    Assessment and Plan of Care:     Patient Interventions include: Facilitated expression of thoughts and feelings and Engaged in theological reflection in forgiveness and suffering.  Prayer and prayer card offered.  Family/Friends Interventions include: No family/friends present    Patient Plan of Care: Spiritual Care available upon further referral  Family/Friends Plan of Care: No family/friends present     01/22/25 1331   Encounter Summary   Encounter Overview/Reason Initial Encounter   Service Provided For Patient   Referral/Consult From Nurse   Last Encounter  01/22/25   Complexity of Encounter Low   Begin Time 1240   End Time  1250   Total Time Calculated 10 min   Assessment/Intervention/Outcome   Assessment Coping;Anxious;Passive   Intervention Active

## 2025-01-22 NOTE — PLAN OF CARE
Problem: Chronic Conditions and Co-morbidities  Goal: Patient's chronic conditions and co-morbidity symptoms are monitored and maintained or improved  Outcome: Progressing  Flowsheets (Taken 1/21/2025 2035)  Care Plan - Patient's Chronic Conditions and Co-Morbidity Symptoms are Monitored and Maintained or Improved: Monitor and assess patient's chronic conditions and comorbid symptoms for stability, deterioration, or improvement     Problem: Discharge Planning  Goal: Discharge to home or other facility with appropriate resources  Outcome: Progressing  Flowsheets (Taken 1/21/2025 2035)  Discharge to home or other facility with appropriate resources: Identify barriers to discharge with patient and caregiver     Problem: Skin/Tissue Integrity  Goal: Absence of new skin breakdown  Description: 1.  Monitor for areas of redness and/or skin breakdown  2.  Assess vascular access sites hourly  3.  Every 4-6 hours minimum:  Change oxygen saturation probe site  4.  Every 4-6 hours:  If on nasal continuous positive airway pressure, respiratory therapy assess nares and determine need for appliance change or resting period.  Outcome: Progressing     Problem: Safety - Adult  Goal: Free from fall injury  Outcome: Progressing     Problem: ABCDS Injury Assessment  Goal: Absence of physical injury  Outcome: Progressing     Problem: Neurosensory - Adult  Goal: Achieves maximal functionality and self care  Outcome: Progressing  Flowsheets (Taken 1/21/2025 2035)  Achieves maximal functionality and self care: Encourage and assist patient to increase activity and self care with guidance from physical therapy/occupational therapy     Problem: Respiratory - Adult  Goal: Achieves optimal ventilation and oxygenation  Outcome: Progressing  Flowsheets (Taken 1/21/2025 2035)  Achieves optimal ventilation and oxygenation: Assess for changes in respiratory status     Problem: Skin/Tissue Integrity - Adult  Goal: Incisions, wounds, or drain sites

## 2025-01-22 NOTE — PLAN OF CARE
Problem: Chronic Conditions and Co-morbidities  Goal: Patient's chronic conditions and co-morbidity symptoms are monitored and maintained or improved  1/22/2025 1451 by Destiny Anne, RN  Outcome: Progressing  Flowsheets  Taken 1/22/2025 0942  Care Plan - Patient's Chronic Conditions and Co-Morbidity Symptoms are Monitored and Maintained or Improved: Monitor and assess patient's chronic conditions and comorbid symptoms for stability, deterioration, or improvement  Taken 1/22/2025 0836  Care Plan - Patient's Chronic Conditions and Co-Morbidity Symptoms are Monitored and Maintained or Improved: Monitor and assess patient's chronic conditions and comorbid symptoms for stability, deterioration, or improvement  1/22/2025 0513 by Bruce Lyle RN  Outcome: Progressing  Flowsheets (Taken 1/21/2025 2035)  Care Plan - Patient's Chronic Conditions and Co-Morbidity Symptoms are Monitored and Maintained or Improved: Monitor and assess patient's chronic conditions and comorbid symptoms for stability, deterioration, or improvement     Problem: Discharge Planning  Goal: Discharge to home or other facility with appropriate resources  1/22/2025 1451 by Destiny Anne, RN  Outcome: Progressing  Flowsheets  Taken 1/22/2025 0942  Discharge to home or other facility with appropriate resources:   Identify barriers to discharge with patient and caregiver   Arrange for needed discharge resources and transportation as appropriate   Identify discharge learning needs (meds, wound care, etc)   Refer to discharge planning if patient needs post-hospital services based on physician order or complex needs related to functional status, cognitive ability or social support system  Taken 1/22/2025 0836  Discharge to home or other facility with appropriate resources:   Identify barriers to discharge with patient and caregiver   Arrange for needed discharge resources and transportation as appropriate   Identify discharge learning needs (meds,

## 2025-01-22 NOTE — ACP (ADVANCE CARE PLANNING)
Advance Care Planning     Advance Care Planning Activator (Inpatient)  Conversation Note      Date of ACP Conversation: 1/22/2025     Conversation Conducted with: Patient with Decision Making Capacity    ACP Activator: Lesly Jackson RN    Health Care Decision Maker:     Current Designated Health Care Decision Maker:     Primary Decision Maker: True Tinoco - Spouse - [phone]    Secondary Decision Maker: Lesly Clark - Child - [phone]  Click here to complete Healthcare Decision Makers including section of the Healthcare Decision Maker Relationship (ie \"Primary\")  Today we documented Decision Maker(s) consistent with Legal Next of Kin hierarchy.    Care Preferences    Ventilation:  \"If you were in your present state of health and suddenly became very ill and were unable to breathe on your own, what would your preference be about the use of a ventilator (breathing machine) if it were available to you?\"      Would the patient desire the use of ventilator (breathing machine)?: yes    \"If your health worsens and it becomes clear that your chance of recovery is unlikely, what would your preference be about the use of a ventilator (breathing machine) if it were available to you?\"     Would the patient desire the use of ventilator (breathing machine)?: No      Resuscitation  \"CPR works best to restart the heart when there is a sudden event, like a heart attack, in someone who is otherwise healthy. Unfortunately, CPR does not typically restart the heart for people who have serious health conditions or who are very sick.\"    \"In the event your heart stopped as a result of an underlying serious health condition, would you want attempts to be made to restart your heart (answer \"yes\" for attempt to resuscitate) or would you prefer a natural death (answer \"no\" for do not attempt to resuscitate)?\" yes       [] Yes   [] No   Educated Patient / Decision Maker regarding differences between Advance Directives and portable DNR

## 2025-01-22 NOTE — CARE COORDINATION
DISCHARGE PLANNING NOTE:                       Post Acute Facility/Agency List     Provided patient with the following list, the list includes the overall star ratings obtained from CMS per the Medicare Web site (www.Medicare.gov):     [] Long Term Acute Care Facilities  [] Acute Inpatient Rehabilitation Facilities  [x] Skilled Nursing Facilities  [] Hospice Facilities  [] Home Care    Provided verbal instructions on how to utilize the QR Code to obtain additional detailed star ratings from www.Medicare.gov     offered to print and provide the detailed list:    []Accepted   [x]Declined     Patient asked for referrals to be sent to [facility] (out of network), [facility] (no beds), [facility] (no beds).    Electronically signed by Lesly Jackson RN on 1/22/2025 at 3:57 PM

## 2025-01-22 NOTE — CONSULTS
[facility] -   Infectious diseases evaluation  admission date 1/21/2025    reason for consultation:   Uti,esbl    Impression :   Current:  Dysuria /possible UTI  Coronary artery disease  History of C. difficile colitis  History of CVA with left-sided weakness  CHF  Hypertension  Hyperlipidemia  History of ESBL    HENCE:   IV meropenem  P.o. vancomycin for prophylaxis  UA  Follow cultures and adjust antibiotics as needed  Follow CBC and renal function    Infection Control Recommendations   [place] Precautions  Contact Isolation       Antimicrobial Stewardship Recommendations   Simplification of therapy  Targeted therapy      History of Present Illness:   Initial history:  Criselda Tinoco is a 75 y.o.-year-old female presented to hospital with dysuria, frequency and urgency with urination and not feeling well for several days.  She also complaining of left shoulder pain that is chronic, denied fever or chills, denied nausea or vomiting, no diarrhea, no other complaints.  She does have chronic lower extremity edema.    Initial WBC 6.5, creatinine 0.8  History of C. difficile colitis  Interval changes  1/22/2025   Patient Vitals for the past 8 hrs:   BP   01/22/25 1456 (!) 120/53             I have personally reviewed the past medical history, past surgical history, medications, social history, and family history, and I haveupdated the database accordingly.      Allergies:   Fentanyl, Bactrim [sulfamethoxazole-trimethoprim], Cat dander, Codeine, Diazepam, Meperidine hcl, and Seasonal     Review of Systems:     Review of Systems  As per history of present illness, other than above 12 system review was negative  Physical Examination :       Physical Exam  Constitutional:       Appearance: She is not ill-appearing.   HENT:      Head: Normocephalic and atraumatic.      Right Ear: External ear normal.      Left Ear: External ear normal.   Eyes:      General: No scleral

## 2025-01-22 NOTE — H&P
[facility] Internal Medicine  Jm Epstein MD; MD Beau Louise MD; Sagrario Navarro MD; Jillian George MD    [facility] Internal Medicine   IN-PATIENT SERVICE   [facility]    HISTORY AND PHYSICAL EXAMINATION            Date:   1/22/2025  Patient name:  Criselda Tinoco  Date of admission:  1/21/2025  5:00 PM  MRN:   [id number]  Account:  [id number]  YOB: 1950  PCP:    Wood [address] MD  Room:   2071/2071-01  Code Status:    Full Code    Chief Complaint:     Chief Complaint   Patient presents with    Dysuria        History Obtained From:     patient, electronic medical record    History of Present Illness:     Criselda Tinoco is a 75 y.o. Non- / non  female who presents with Dysuria   and is admitted to the hospital for the management of Urinary tract infection.  Criselda Tinoco is a 75 y.o. Non- / non  female with a history of C. 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     According to patient and her family she has been feeling unwell for the past several weeks.  She had her urine tested at the [facility].  She complains of dysuria urgency, and and frequency.  [facility] and stated that her urine culture grew Proteus Mirabella with ESBL and E. coli.     The patient stated that she had surgery on her neck and has had difficulty with her activities of daily living since.  She states that she works faithfully with outpatient therapy.  She also endorses not taking her Lasix because she has difficulty making it to the restroom in time.  Upon assessment 1+ pitting edema was noted in her bilateral lower extremities.  Her abdomen was soft and diffusely tender.     Upon presentation to the ER

## 2025-01-22 NOTE — PROGRESS NOTES
01/22/25 1048   Encounter Summary   Encounter Overview/Reason Spiritual/Emotional Needs   Service Provided For Patient not available  (Staff with PT)

## 2025-01-22 NOTE — PROGRESS NOTES
Past Medical History:   Diagnosis Date    Allergic rhinitis, cause unspecified     Back pain     lumbar    Bowel obstruction (HCC)     history of due to scar tissue, resolved non-surgically    C. difficile diarrhea     CAD (coronary artery disease)     no stent needed per pt. Dr. Solorzano did cath at  2005    Cardiac murmur     Cellulitis 2017 August    leg left leg/bug bite    Cerebral artery occlusion with cerebral infarction (HCC)     TIA 2014    COVID-19     ONE YR AGO IN 4/25/2020 fever and cough    Diverticulosis of colon (without mention of hemorrhage)     GERD (gastroesophageal reflux disease)     on rx    History of blood transfusion     approx 2020    -no reactions    History of CHF (congestive heart failure)     History of MI (myocardial infarction) 2005    thought due to a blood clot    History of ovarian cyst 1970    had oopherectomy holly    History of peritonitis 1968    due to ruptured appendix age 17    HTN (hypertension)     Hx of blood clots     right leg    Hyperlipidemia     Intestinal or peritoneal adhesions with obstruction (postoperative) (postinfection) (HCC)     Kidney infection     renal failure/sepsis/spider bite    Lateral epicondylitis  of elbow     MDRO (multiple drug resistant organisms) resistance     c diff    Muscle strain     right posterior shoulder    Other abnormal glucose     PONV (postoperative nausea and vomiting)     dry heaves    Pre-diabetes     Restless legs syndrome (RLS)     Snores     no cpap    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)     TIA (transient ischemic attack) 2014    Under care of service provider 07/12/2023    qbvygamuox-izmkff-tzruui-last visit june 2023    Under care of service provider 07/12/2023    itflumzzf-lvc-ym vincent-last visit may 2023    Uses walker     Vitamin D deficiency     Wears glasses     Wellness examination 07/12/2023    egg-rpkyar-[facility]-last visit july 2023        Past Surgical History:     Past Surgical History:  EXTRACTION      WRIST FRACTURE SURGERY Left 03/05/2019    WRIST OPEN REDUCTION INTERNAL FIXATION performed by Lukasz Bonilla MD at [facility] OR        Medications Prior to Admission:     Prior to Admission medications    Medication Sig Start Date End Date Taking? Authorizing Provider   gabapentin (NEURONTIN) 100 MG capsule Take 2 capsules by mouth 2 times daily for 90 days. 1/21/25 4/21/25 Yes Sagrario Navarro MD   baclofen (LIORESAL) 10 MG tablet Take 1 tablet by mouth 2 times daily 1/21/25  Yes Sagrario Navarro MD   hydrALAZINE (APRESOLINE) 25 MG tablet Take 1 tablet by mouth 3 times daily 11/21/24  Yes Wood St MD   acetaminophen (TYLENOL) 325 MG tablet Take 2 tablets by mouth every 4 hours as needed for Pain 11/18/24  Yes Wood St MD   atorvastatin (LIPITOR) 80 MG tablet Take 1 tablet by mouth daily 10/24/24  Yes Jm Epstein MD   aspirin 81 MG EC tablet Take 1 tablet by mouth daily 10/24/24  Yes Jm Epstein MD   busPIRone (BUSPAR) 5 MG tablet Take 1 tablet by mouth 3 times daily 10/10/24  Yes Wood [address] MD   sertraline (ZOLOFT) 25 MG tablet Take 1 tablet by mouth in the morning, at noon, and at bedtime 9/12/24  Yes Wood St MD   furosemide (LASIX) 40 MG tablet Take 1 tablet by mouth daily 8/1/24  Yes Wood St MD   apixaban (ELIQUIS) 5 MG TABS tablet Take 1 tablet by mouth 2 times daily 7/2/24  Yes Wood St MD   carvedilol (COREG) 25 MG tablet Take 1 tablet by mouth 2 times daily (with meals) 7/2/24  Yes Wood St MD   amLODIPine (NORVASC) 10 MG tablet Take 1 tablet by mouth daily 7/2/24  Yes Wood St MD   potassium chloride (KLOR-CON) 10 MEQ extended release tablet Take 2 tablets by mouth daily 7/2/24  Yes Wood St MD   dextromethorphan-quiNIDine (NUEDEXTA) 20-10 MG CAPS per capsule Take 1 capsule by mouth daily  Patient not taking: Reported on 1/21/2025 12/24/24   Raudel-Kate Noe, APRN - CNP   miconazole (MICOTIN) 2 % powder Apply

## 2025-01-22 NOTE — PROGRESS NOTES
Pharmacy Medication History Note      List of current medications patient is taking is incomplete.    Source of information: patient/family, [facility] [employer], OARRS, Care Everywhere     Changes made to medication list:  Medications removed (include reason, ex. therapy complete or physician discontinued, noncompliance):  None     Medications flagged for provider review:  Nudexta - patient reports she did not start this medication due to cost   Milk of magnesia - patient reports not taking   Miconazole powder - patient reports not taking     Medications added/doses adjusted:  None     Other notes (ex. Recent course of antibiotics, Coumadin dosing):  Medications primarily confirmed by patient report. The patient fills most medications through [facility] [employer] ([phone]) which is currently closed. Please contact once they reopen to confirm dosing.   OARRS negative however [facility] not [facility]. Unable to confirm last fill date at this time.       Current Home Medication List at Time of Admission:  Prior to Admission medications    Medication Sig   gabapentin (NEURONTIN) 100 MG capsule Take 2 capsules by mouth 2 times daily for 90 days.   baclofen (LIORESAL) 10 MG tablet Take 1 tablet by mouth 2 times daily   dextromethorphan-quiNIDine (NUEDEXTA) 20-10 MG CAPS per capsule Take 1 capsule by mouth daily  Patient not taking: Reported on 1/21/2025   hydrALAZINE (APRESOLINE) 25 MG tablet Take 1 tablet by mouth 3 times daily   acetaminophen (TYLENOL) 325 MG tablet Take 2 tablets by mouth every 4 hours as needed for Pain   atorvastatin (LIPITOR) 80 MG tablet Take 1 tablet by mouth daily   aspirin 81 MG EC tablet Take 1 tablet by mouth daily   miconazole (MICOTIN) 2 % powder Apply topically 2 times daily.  Patient not taking: Reported on 1/21/2025   busPIRone (BUSPAR) 5 MG tablet Take 1 tablet by mouth 3 times daily   sertraline (ZOLOFT) 25 MG tablet Take 1 tablet by mouth in the

## 2025-01-23 LAB
ANION GAP SERPL CALCULATED.3IONS-SCNC: 10 MMOL/L (ref 9–16)
BASOPHILS # BLD: 0.1 K/UL (ref 0–0.2)
BASOPHILS NFR BLD: 1 % (ref 0–2)
BUN SERPL-MCNC: 19 MG/DL (ref 8–23)
CALCIUM SERPL-MCNC: 8.5 MG/DL (ref 8.6–10.4)
CHLORIDE SERPL-SCNC: 107 MMOL/L (ref 98–107)
CO2 SERPL-SCNC: 26 MMOL/L (ref 20–31)
CREAT SERPL-MCNC: 0.7 MG/DL (ref 0.7–1.2)
EOSINOPHIL # BLD: 0.7 K/UL (ref 0–0.4)
EOSINOPHILS RELATIVE PERCENT: 9 % (ref 0–4)
ERYTHROCYTE [DISTWIDTH] IN BLOOD BY AUTOMATED COUNT: 14 % (ref 11.5–14.9)
GFR, ESTIMATED: >90 ML/MIN/1.73M2
GLUCOSE SERPL-MCNC: 125 MG/DL (ref 74–99)
HCT VFR BLD AUTO: 37.3 % (ref 36–46)
HGB BLD-MCNC: 12.5 G/DL (ref 12–16)
LYMPHOCYTES NFR BLD: 1.6 K/UL (ref 1–4.8)
LYMPHOCYTES RELATIVE PERCENT: 20 % (ref 24–44)
MCH RBC QN AUTO: 32.3 PG (ref 26–34)
MCHC RBC AUTO-ENTMCNC: 33.6 G/DL (ref 31–37)
MCV RBC AUTO: 96.1 FL (ref 80–100)
MICROORGANISM SPEC CULT: NORMAL
MONOCYTES NFR BLD: 0.7 K/UL (ref 0.1–1.3)
MONOCYTES NFR BLD: 9 % (ref 1–7)
NEUTROPHILS NFR BLD: 61 % (ref 36–66)
NEUTS SEG NFR BLD: 4.8 K/UL (ref 1.3–9.1)
PLATELET # BLD AUTO: 227 K/UL (ref 150–450)
PMV BLD AUTO: 7.4 FL (ref 6–12)
POTASSIUM SERPL-SCNC: 3.9 MMOL/L (ref 3.7–5.3)
RBC # BLD AUTO: 3.87 M/UL (ref 4–5.2)
SERVICE CMNT-IMP: NORMAL
SODIUM SERPL-SCNC: 143 MMOL/L (ref 136–145)
SPECIMEN DESCRIPTION: NORMAL
WBC OTHER # BLD: 7.9 K/UL (ref 3.5–11)

## 2025-01-23 PROCEDURE — 6360000002 HC RX W HCPCS

## 2025-01-23 PROCEDURE — 97166 OT EVAL MOD COMPLEX 45 MIN: CPT

## 2025-01-23 PROCEDURE — 97162 PT EVAL MOD COMPLEX 30 MIN: CPT

## 2025-01-23 PROCEDURE — G0545 PR INHERENT VISIT TO INPT: HCPCS | Performed by: INTERNAL MEDICINE

## 2025-01-23 PROCEDURE — 6370000000 HC RX 637 (ALT 250 FOR IP)

## 2025-01-23 PROCEDURE — 97530 THERAPEUTIC ACTIVITIES: CPT

## 2025-01-23 PROCEDURE — 2500000003 HC RX 250 WO HCPCS

## 2025-01-23 PROCEDURE — 99233 SBSQ HOSP IP/OBS HIGH 50: CPT

## 2025-01-23 PROCEDURE — 85025 COMPLETE CBC W/AUTO DIFF WBC: CPT

## 2025-01-23 PROCEDURE — 1200000000 HC SEMI PRIVATE

## 2025-01-23 PROCEDURE — 36415 COLL VENOUS BLD VENIPUNCTURE: CPT

## 2025-01-23 PROCEDURE — 6370000000 HC RX 637 (ALT 250 FOR IP): Performed by: INTERNAL MEDICINE

## 2025-01-23 PROCEDURE — 99232 SBSQ HOSP IP/OBS MODERATE 35: CPT | Performed by: INTERNAL MEDICINE

## 2025-01-23 PROCEDURE — 2580000003 HC RX 258

## 2025-01-23 PROCEDURE — 99223 1ST HOSP IP/OBS HIGH 75: CPT | Performed by: PHYSICAL MEDICINE & REHABILITATION

## 2025-01-23 PROCEDURE — 80048 BASIC METABOLIC PNL TOTAL CA: CPT

## 2025-01-23 RX ADMIN — AMLODIPINE BESYLATE 10 MG: 10 TABLET ORAL at 08:29

## 2025-01-23 RX ADMIN — GABAPENTIN 200 MG: 100 CAPSULE ORAL at 08:29

## 2025-01-23 RX ADMIN — ASPIRIN 81 MG: 81 TABLET, COATED ORAL at 08:29

## 2025-01-23 RX ADMIN — ACETAMINOPHEN 650 MG: 325 TABLET ORAL at 21:59

## 2025-01-23 RX ADMIN — HYDRALAZINE HYDROCHLORIDE 25 MG: 25 TABLET ORAL at 21:56

## 2025-01-23 RX ADMIN — BUSPIRONE HYDROCHLORIDE 5 MG: 5 TABLET ORAL at 21:57

## 2025-01-23 RX ADMIN — CARVEDILOL 25 MG: 25 TABLET, FILM COATED ORAL at 08:29

## 2025-01-23 RX ADMIN — BUSPIRONE HYDROCHLORIDE 5 MG: 5 TABLET ORAL at 15:01

## 2025-01-23 RX ADMIN — VANCOMYCIN HYDROCHLORIDE 125 MG: KIT at 08:45

## 2025-01-23 RX ADMIN — GABAPENTIN 200 MG: 100 CAPSULE ORAL at 21:56

## 2025-01-23 RX ADMIN — HYDRALAZINE HYDROCHLORIDE 25 MG: 25 TABLET ORAL at 08:29

## 2025-01-23 RX ADMIN — CALCIUM CARBONATE 600 MG (1,500 MG)-VITAMIN D3 400 UNIT TABLET 1 TABLET: at 08:29

## 2025-01-23 RX ADMIN — ATORVASTATIN CALCIUM 80 MG: 80 TABLET, FILM COATED ORAL at 08:29

## 2025-01-23 RX ADMIN — APIXABAN 5 MG: 5 TABLET, FILM COATED ORAL at 21:56

## 2025-01-23 RX ADMIN — BACLOFEN 10 MG: 10 TABLET ORAL at 08:29

## 2025-01-23 RX ADMIN — MEROPENEM 1000 MG: 1 INJECTION INTRAVENOUS at 01:58

## 2025-01-23 RX ADMIN — SERTRALINE HYDROCHLORIDE 25 MG: 50 TABLET ORAL at 15:00

## 2025-01-23 RX ADMIN — FUROSEMIDE 40 MG: 40 TABLET ORAL at 08:29

## 2025-01-23 RX ADMIN — MEROPENEM 1000 MG: 1 INJECTION INTRAVENOUS at 08:36

## 2025-01-23 RX ADMIN — HYDRALAZINE HYDROCHLORIDE 25 MG: 25 TABLET ORAL at 15:00

## 2025-01-23 RX ADMIN — BUSPIRONE HYDROCHLORIDE 5 MG: 5 TABLET ORAL at 08:29

## 2025-01-23 RX ADMIN — SERTRALINE HYDROCHLORIDE 25 MG: 50 TABLET ORAL at 08:48

## 2025-01-23 RX ADMIN — Medication 6 MG: at 21:56

## 2025-01-23 RX ADMIN — POTASSIUM CHLORIDE 20 MEQ: 1500 TABLET, EXTENDED RELEASE ORAL at 08:29

## 2025-01-23 RX ADMIN — APIXABAN 5 MG: 5 TABLET, FILM COATED ORAL at 08:29

## 2025-01-23 RX ADMIN — ACETAMINOPHEN 650 MG: 325 TABLET ORAL at 15:00

## 2025-01-23 RX ADMIN — BACLOFEN 10 MG: 10 TABLET ORAL at 21:56

## 2025-01-23 RX ADMIN — MICONAZOLE NITRATE: 2 POWDER TOPICAL at 08:46

## 2025-01-23 RX ADMIN — SODIUM CHLORIDE, PRESERVATIVE FREE 10 ML: 5 INJECTION INTRAVENOUS at 22:02

## 2025-01-23 ASSESSMENT — PAIN DESCRIPTION - LOCATION
LOCATION: NECK
LOCATION: NECK

## 2025-01-23 ASSESSMENT — PAIN DESCRIPTION - DESCRIPTORS
DESCRIPTORS: ACHING
DESCRIPTORS: ACHING

## 2025-01-23 ASSESSMENT — PAIN SCALES - GENERAL
PAINLEVEL_OUTOF10: 5
PAINLEVEL_OUTOF10: 8
PAINLEVEL_OUTOF10: 8

## 2025-01-23 ASSESSMENT — PAIN DESCRIPTION - ORIENTATION
ORIENTATION: POSTERIOR
ORIENTATION: LEFT

## 2025-01-23 NOTE — PROGRESS NOTES
Physical Therapy  [facility]   Physical Therapy Evaluation  Date: 25  Patient Name: Criselda Tinoco       Room: -  MRN: [id number]  Account: [id number]   : 1950  (75 y.o.) Gender: female     Discharge Recommendations:  Discharge Recommendations: Patient would benefit from continued therapy after discharge, Therapy recommended at discharge           Past Medical History:  has a past medical history of Allergic rhinitis, cause unspecified, Back pain, Bowel obstruction (HCC), C. difficile diarrhea, CAD (coronary artery disease), Cardiac murmur, Cellulitis, Cerebral artery occlusion with cerebral infarction (HCC), COVID-19, Diverticulosis of colon (without mention of hemorrhage), GERD (gastroesophageal reflux disease), History of blood transfusion, History of CHF (congestive heart failure), History of MI (myocardial infarction), History of ovarian cyst, History of peritonitis, HTN (hypertension), Hx of blood clots, Hyperlipidemia, Intestinal or peritoneal adhesions with obstruction (postoperative) (postinfection) (HCC), Kidney infection, Lateral epicondylitis  of elbow, MDRO (multiple drug resistant organisms) resistance, Muscle strain, Other abnormal glucose, PONV (postoperative nausea and vomiting), Pre-diabetes, Restless legs syndrome (RLS), Snores, 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  Past Surgical History:   has a past surgical history that includes Ovary removal (); colectomy (); Appendectomy (); Ovary removal (); Hysterectomy (); Bunionectomy (Left); sinus surgery (); Colonoscopy; other surgical history (2014); cyst removal (Right); Wrist fracture surgery (Left, 2019); Upper gastrointestinal endoscopy (N/A, 2019); Upper gastrointestinal

## 2025-01-23 NOTE — CARE COORDINATION
DISCHARGE PLANNING NOTE:    LSW following for potential discharge placement. Writer met with patient to obtain additional choices. Patient requests referral to [facility]. PM&R requested.

## 2025-01-23 NOTE — CONSULTS
Physical Medicine & Rehabilitation  Consult Note      Admitting Physician: Jillian George MD    Primary Care Provider: Wood St MD     Reason for Consult:  Acute Inpatient Rehabilitation    Chief Complaint: Dysuria    History of Present Illness:  Referring Provider is requesting an evaluation for appropriate placement upon discharge from acute care.     Ms. Criselda Tinoco is a 75 y.o. left handed female who was admitted to [facility] on 1/21/2025 with Dysuria    75-year-old female admitted for urinary tract infection, has history of C. difficile, coronary disease, cardiac murmur, cerebral artery occlusion with residual left-sided weakness, CHF, MI, hypertension hyperlipidemia presented with dysuria and UTI.  She has been feeling unwell for several weeks..  Urine culture grew out Proteus mirabilis with ESBL and E. coli.  Also notes she has had surgery on her neck and has difficulty with ADLs she is in outpatient therapy patient treated with IV antibiotics monitor PVRs    ID-dysuria/doubt UTI-1/23-received IV meropenem x 6 doses discontinued meropenem and Vanco    Internal medicine-as above check PVR, history of CVA on aspirin, history of DVT/PE on Eliquis continue Lasix for heart failure preserved ejection fraction    Radiology:  No results found.     Review of Systems:  Constitutional: negative for anorexia, chills, fatigue, fevers, sweats and weight loss  Eyes: negative for redness and visual disturbance  Ears, nose, mouth, throat, and face: negative for earaches, sore throat and tinnitus  Respiratory: negative for cough and shortness of breath  Cardiovascular: negative for chest pain, dyspnea and palpitations  Gastrointestinal: negative for abdominal pain, change in bowel habits, constipation, nausea and vomiting  Genitourinary:negative for dysuria, frequency, hesitancy and urinary incontinence  Integument/breast: negative for pruritus and rash  Musculoskeletal:negative for muscle weakness and stiff

## 2025-01-23 NOTE — PROGRESS NOTES
[facility]   Occupational Therapy Evaluation  Date: 25  Patient Name: Criselda Tinoco       Room:   MRN: [id number]  Account: [id number]   : 1950  (75 y.o.) Gender: female     Discharge Recommendations:  Further Occupational Therapy is recommended upon facility discharge.    OT Equipment Recommendations  Other: TBD    Referring Practitioner: Jillian George MD  Diagnosis: UTI due to extended spectrum beta lactamase producing escherichia coli     Treatment Diagnosis: Impaired self-care status    Past Medical History:  has a past medical history of Allergic rhinitis, cause unspecified, Back pain, Bowel obstruction ([facility]), C. difficile diarrhea, CAD (coronary artery disease), Cardiac murmur, Cellulitis, Cerebral artery occlusion with cerebral infarction ([facility]), COVID-19, Diverticulosis of colon (without mention of hemorrhage), GERD (gastroesophageal reflux disease), History of blood transfusion, History of CHF (congestive heart failure), History of MI (myocardial infarction), History of ovarian cyst, History of peritonitis, HTN (hypertension), Hx of blood clots, Hyperlipidemia, Intestinal or peritoneal adhesions with obstruction (postoperative) (postinfection) (HCC), Kidney infection, Lateral epicondylitis  of elbow, MDRO (multiple drug resistant organisms) resistance, Muscle strain, Other abnormal glucose, PONV (postoperative nausea and vomiting), Pre-diabetes, Restless legs syndrome (RLS), Snores, 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    Past Surgical History:   has a past surgical history that includes Ovary removal (); colectomy (); Appendectomy (); Ovary removal (); Hysterectomy (); Bunionectomy (Left); sinus surgery (); Colonoscopy; other surgical history  Limits  Orientation Level: Oriented X4  Cognition  Overall Cognitive Status: Exceptions  Arousal/Alertness: Appears intact  Following Commands: Follows one step commands with repetition, Follows one step commands with increased time  Attention Span: Attends with cues to redirect  Safety Judgement: Decreased awareness of need for assistance, Decreased awareness of need for safety  Problem Solving: Assistance required to generate solutions, Assistance required to implement solutions  Insights: Decreased awareness of deficits  Initiation: Requires cues for some  Sequencing: Requires cues for some   Sensation  Overall Sensation Status: Impaired (pt reports n/t and shakiness in L hand)  Vision  Vision: Impaired  Vision Exceptions: Wears glasses at all times  Hearing  Hearing: Within functional limits    UE Function  LUE AROM (degrees)  LUE AROM : WFL  LUE General AROM: 0-90 deg at shoulder, otherwise WFL  Left Hand AROM (degrees)  Left Hand AROM: WFL  Tone LUE  LUE Tone: Normotonic  LUE Strength  Gross LUE Strength: WFL  L Hand General: 3+/5  LUE Strength Comment: Grossly 3+/5    RUE AROM (degrees)  RUE AROM : WFL  Right Hand AROM (degrees)  Right Hand AROM: WFL  Tone RUE  RUE Tone: Normotonic  RUE Strength  Gross RUE Strength: WFL  R Hand General: 4/5  RUE Strength Comment: Grossly 4/5    Fine Motor Skills/Coordination  Coordination  Movements Are Fluid And Coordinated: No  Coordination and Movement Description: Left UE, Gross motor impairments     Bed Mobility  Bed mobility  Supine to Sit: Maximum assistance  Sit to Supine: Unable to assess (pt left up in recliner)  Scooting: Maximal assistance  Bed Mobility Comments: Max A x1 bed mobility with HOB elevated and use of hand rail    Balance  Balance  Sitting Balance: Minimal assistance (Fluctuated between SBA and Min A)  Standing Balance: Maximum assistance (Max A x2)  Standing Balance  Time: 4-5 minutes  Activity: Functional transfers, attempted steps  Comment: LAMINE

## 2025-01-23 NOTE — PROGRESS NOTES
[facility] -   Infectious diseases evaluation  admission date 1/21/2025    reason for consultation:   Uti,esbl    Impression :   Current:  Dysuria /doubt UTI  Coronary artery disease  History of C. difficile colitis  History of CVA with left-sided weakness  CHF  Hypertension  Hyperlipidemia  History of ESBL    HENCE:   Discontinue IV meropenem, was started on 1/21/2025, received 6 doses  Discontinue p.o. vancomycin   UA 1/22/2025 showed small leukocyte esterase, 3-5 WBC, no growth on culture to date  Follow CBC and renal function    Infection Control Recommendations   [place] Precautions  Contact Isolation       Antimicrobial Stewardship Recommendations   Simplification of therapy  Targeted therapy      History of Present Illness:   Initial history:  Criselda Tinoco is a 75 y.o.-year-old female presented to hospital with dysuria, frequency and urgency with urination and not feeling well for several days.  She also complaining of left shoulder pain that is chronic, denied fever or chills, denied nausea or vomiting, no diarrhea, no other complaints.  She does have chronic lower extremity edema.    Initial WBC 6.5, creatinine 0.8  History of C. difficile colitis  Interval changes  1/23/2025   She is afebrile today, denied dysuria, no abdominal pain, no new complaints.  WBC 7.9  No growth on urine culture from 1/22/25  Patient Vitals for the past 8 hrs:   BP Temp Temp src Pulse Resp SpO2   01/23/25 0616 (!) 157/61 98.2 °F (36.8 °C) Oral 61 18 98 %             I have personally reviewed the past medical history, past surgical history, medications, social history, and family history, and I haveupdated the database accordingly.      Allergies:   Fentanyl, Bactrim [sulfamethoxazole-trimethoprim], Cat dander, Codeine, Diazepam, Meperidine hcl, and Seasonal     Review of Systems:     Review of Systems  As per history of present illness, other than above 12 system review was

## 2025-01-23 NOTE — PROGRESS NOTES
[facility] Internal Medicine  Jm Epstein MD; MD Beau Louise MD; Sagrario Navarro MD; Jillian George MD    [facility] Internal Medicine   IN-PATIENT SERVICE   [facility]    Progress Note            Date:   1/23/2025  Patient name:  Criselda Tinoco  Date of admission:  1/21/2025  5:00 PM  MRN:   [id number]  Account:  [id number]  YOB: 1950  PCP:    [address] MD  Room:   2071/2071-01  Code Status:    Full Code    Chief Complaint:     Chief Complaint   Patient presents with    Dysuria        History Obtained From:     patient, electronic medical record    History of Present Illness:     Criselda Tinoco is a 75 y.o. Non- / non  female who presents with Dysuria   and is admitted to the hospital for the management of UTI due to extended-spectrum beta lactamase (ESBL) producing Escherichia coli.  Criselda Tinoco is a 75 y.o. Non- / non  female with a history of C. 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     According to patient and her family she has been feeling unwell for the past several weeks.  She had her urine tested at the [facility].  She complains of dysuria urgency, and and frequency.  [facility] and stated that her urine culture grew Proteus Mirabella with ESBL and E. coli.     The patient stated that she had surgery on her neck and has had difficulty with her activities of daily living since.  She states that she works faithfully with outpatient therapy.  She also endorses not taking her Lasix because she has difficulty making it to the restroom in time.  Upon assessment 1+ pitting edema was noted in her bilateral lower extremities.  Her abdomen was soft and diffusely  tender.     Upon presentation to the ER the patient was mildly hypertensive.  Her BNP was unremarkable at 89.  The rest of her CBC and BMP were both unremarkable.  Plan to admit to medical surgical for IV antibiotics, PT.  OT. Repeat urine studies.  Obtain post void residuals.    Past Medical History:     Past Medical History:   Diagnosis Date    Allergic rhinitis, cause unspecified     Back pain     lumbar    Bowel obstruction (HCC)     history of due to scar tissue, resolved non-surgically    C. difficile diarrhea     CAD (coronary artery disease)     no stent needed per pt. Dr. Solorzano did cath at  2005    Cardiac murmur     Cellulitis 2017 August    leg left leg/bug bite    Cerebral artery occlusion with cerebral infarction (HCC)     TIA 2014    COVID-19     ONE YR AGO IN 4/25/2020 fever and cough    Diverticulosis of colon (without mention of hemorrhage)     GERD (gastroesophageal reflux disease)     on rx    History of blood transfusion     approx 2020    -no reactions    History of CHF (congestive heart failure)     History of MI (myocardial infarction) 2005    thought due to a blood clot    History of ovarian cyst 1970    had oopherectomy holly    History of peritonitis 1968    due to ruptured appendix age 17    HTN (hypertension)     Hx of blood clots     right leg    Hyperlipidemia     Intestinal or peritoneal adhesions with obstruction (postoperative) (postinfection) (HCC)     Kidney infection     renal failure/sepsis/spider bite    Lateral epicondylitis  of elbow     MDRO (multiple drug resistant organisms) resistance     c diff    Muscle strain     right posterior shoulder    Other abnormal glucose     PONV (postoperative nausea and vomiting)     dry heaves    Pre-diabetes     Restless legs syndrome (RLS)     Snores     no cpap    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)     TIA (transient ischemic attack) 2014    Under care of service provider 07/12/2023    [id number]-last visit

## 2025-01-23 NOTE — PLAN OF CARE
Problem: Discharge Planning  Goal: Discharge to home or other facility with appropriate resources  Outcome: Progressing     Problem: Safety - Adult  Goal: Free from fall injury  Outcome: Progressing     Problem: Neurosensory - Adult  Goal: Achieves maximal functionality and self care  Outcome: Progressing     Problem: Respiratory - Adult  Goal: Achieves optimal ventilation and oxygenation  Outcome: Progressing     Problem: Skin/Tissue Integrity - Adult  Goal: Incisions, wounds, or drain sites healing without S/S of infection  Outcome: Progressing     Problem: Genitourinary - Adult  Goal: Absence of urinary retention  Outcome: Progressing     Problem: Infection - Adult  Goal: Absence of infection at discharge  Outcome: Progressing

## 2025-01-23 NOTE — PLAN OF CARE
Problem: Chronic Conditions and Co-morbidities  Goal: Patient's chronic conditions and co-morbidity symptoms are monitored and maintained or improved  Outcome: Progressing  Flowsheets (Taken 1/23/2025 0851)  Care Plan - Patient's Chronic Conditions and Co-Morbidity Symptoms are Monitored and Maintained or Improved: Monitor and assess patient's chronic conditions and comorbid symptoms for stability, deterioration, or improvement     Problem: Discharge Planning  Goal: Discharge to home or other facility with appropriate resources  1/23/2025 1640 by Destiny Anne RN  Outcome: Progressing  Flowsheets (Taken 1/23/2025 0851)  Discharge to home or other facility with appropriate resources:   Identify barriers to discharge with patient and caregiver   Arrange for needed discharge resources and transportation as appropriate   Identify discharge learning needs (meds, wound care, etc)   Refer to discharge planning if patient needs post-hospital services based on physician order or complex needs related to functional status, cognitive ability or social support system  1/23/2025 0624 by Deanne Shafer RN  Outcome: Progressing     Problem: Skin/Tissue Integrity  Goal: Absence of new skin breakdown  Description: 1.  Monitor for areas of redness and/or skin breakdown  2.  Assess vascular access sites hourly  3.  Every 4-6 hours minimum:  Change oxygen saturation probe site  4.  Every 4-6 hours:  If on nasal continuous positive airway pressure, respiratory therapy assess nares and determine need for appliance change or resting period.  Outcome: Progressing     Problem: Safety - Adult  Goal: Free from fall injury  1/23/2025 1640 by Destiny Anne, RN  Outcome: Progressing  1/23/2025 0624 by Deanne Shafer RN  Outcome: Progressing     Problem: ABCDS Injury Assessment  Goal: Absence of physical injury  Outcome: Progressing     Problem: Neurosensory - Adult  Goal: Achieves maximal functionality and self care  1/23/2025 1640 by

## 2025-01-24 LAB
ANION GAP SERPL CALCULATED.3IONS-SCNC: 11 MMOL/L (ref 9–16)
BASOPHILS # BLD: 0.1 K/UL (ref 0–0.2)
BASOPHILS NFR BLD: 1 % (ref 0–2)
BUN SERPL-MCNC: 19 MG/DL (ref 8–23)
CALCIUM SERPL-MCNC: 8.7 MG/DL (ref 8.6–10.4)
CHLORIDE SERPL-SCNC: 103 MMOL/L (ref 98–107)
CO2 SERPL-SCNC: 27 MMOL/L (ref 20–31)
CREAT SERPL-MCNC: 0.8 MG/DL (ref 0.7–1.2)
EOSINOPHIL # BLD: 0.8 K/UL (ref 0–0.4)
EOSINOPHILS RELATIVE PERCENT: 12 % (ref 0–4)
ERYTHROCYTE [DISTWIDTH] IN BLOOD BY AUTOMATED COUNT: 13.6 % (ref 11.5–14.9)
GFR, ESTIMATED: 77 ML/MIN/1.73M2
GLUCOSE SERPL-MCNC: 128 MG/DL (ref 74–99)
HCT VFR BLD AUTO: 40.1 % (ref 36–46)
HGB BLD-MCNC: 13.5 G/DL (ref 12–16)
LYMPHOCYTES NFR BLD: 1.7 K/UL (ref 1–4.8)
LYMPHOCYTES RELATIVE PERCENT: 24 % (ref 24–44)
MCH RBC QN AUTO: 32.5 PG (ref 26–34)
MCHC RBC AUTO-ENTMCNC: 33.7 G/DL (ref 31–37)
MCV RBC AUTO: 96.2 FL (ref 80–100)
MONOCYTES NFR BLD: 0.6 K/UL (ref 0.1–1.3)
MONOCYTES NFR BLD: 9 % (ref 1–7)
NEUTROPHILS NFR BLD: 54 % (ref 36–66)
NEUTS SEG NFR BLD: 3.7 K/UL (ref 1.3–9.1)
PLATELET # BLD AUTO: 233 K/UL (ref 150–450)
PMV BLD AUTO: 7.6 FL (ref 6–12)
POTASSIUM SERPL-SCNC: 3.9 MMOL/L (ref 3.7–5.3)
RBC # BLD AUTO: 4.16 M/UL (ref 4–5.2)
SODIUM SERPL-SCNC: 141 MMOL/L (ref 136–145)
WBC OTHER # BLD: 6.9 K/UL (ref 3.5–11)

## 2025-01-24 PROCEDURE — 97530 THERAPEUTIC ACTIVITIES: CPT

## 2025-01-24 PROCEDURE — 85025 COMPLETE CBC W/AUTO DIFF WBC: CPT

## 2025-01-24 PROCEDURE — 97535 SELF CARE MNGMENT TRAINING: CPT

## 2025-01-24 PROCEDURE — 80048 BASIC METABOLIC PNL TOTAL CA: CPT

## 2025-01-24 PROCEDURE — 99231 SBSQ HOSP IP/OBS SF/LOW 25: CPT | Performed by: INTERNAL MEDICINE

## 2025-01-24 PROCEDURE — 36415 COLL VENOUS BLD VENIPUNCTURE: CPT

## 2025-01-24 PROCEDURE — G0545 PR INHERENT VISIT TO INPT: HCPCS | Performed by: INTERNAL MEDICINE

## 2025-01-24 PROCEDURE — 99232 SBSQ HOSP IP/OBS MODERATE 35: CPT | Performed by: PHYSICAL MEDICINE & REHABILITATION

## 2025-01-24 PROCEDURE — 6370000000 HC RX 637 (ALT 250 FOR IP)

## 2025-01-24 PROCEDURE — 97116 GAIT TRAINING THERAPY: CPT

## 2025-01-24 PROCEDURE — 2500000003 HC RX 250 WO HCPCS

## 2025-01-24 PROCEDURE — 99232 SBSQ HOSP IP/OBS MODERATE 35: CPT | Performed by: INTERNAL MEDICINE

## 2025-01-24 PROCEDURE — 1200000000 HC SEMI PRIVATE

## 2025-01-24 RX ADMIN — HYDRALAZINE HYDROCHLORIDE 25 MG: 25 TABLET ORAL at 07:55

## 2025-01-24 RX ADMIN — ACETAMINOPHEN 650 MG: 325 TABLET ORAL at 16:40

## 2025-01-24 RX ADMIN — BUSPIRONE HYDROCHLORIDE 5 MG: 5 TABLET ORAL at 21:24

## 2025-01-24 RX ADMIN — SERTRALINE HYDROCHLORIDE 25 MG: 50 TABLET ORAL at 21:25

## 2025-01-24 RX ADMIN — SERTRALINE HYDROCHLORIDE 25 MG: 50 TABLET ORAL at 07:55

## 2025-01-24 RX ADMIN — Medication 6 MG: at 21:24

## 2025-01-24 RX ADMIN — GABAPENTIN 200 MG: 100 CAPSULE ORAL at 21:24

## 2025-01-24 RX ADMIN — CARVEDILOL 25 MG: 25 TABLET, FILM COATED ORAL at 07:55

## 2025-01-24 RX ADMIN — HYDRALAZINE HYDROCHLORIDE 25 MG: 25 TABLET ORAL at 14:12

## 2025-01-24 RX ADMIN — CARVEDILOL 25 MG: 25 TABLET, FILM COATED ORAL at 16:40

## 2025-01-24 RX ADMIN — BACLOFEN 10 MG: 10 TABLET ORAL at 21:24

## 2025-01-24 RX ADMIN — ATORVASTATIN CALCIUM 80 MG: 80 TABLET, FILM COATED ORAL at 07:55

## 2025-01-24 RX ADMIN — BACLOFEN 10 MG: 10 TABLET ORAL at 07:55

## 2025-01-24 RX ADMIN — MICONAZOLE NITRATE: 2 POWDER TOPICAL at 21:27

## 2025-01-24 RX ADMIN — HYDRALAZINE HYDROCHLORIDE 25 MG: 25 TABLET ORAL at 21:24

## 2025-01-24 RX ADMIN — APIXABAN 5 MG: 5 TABLET, FILM COATED ORAL at 07:55

## 2025-01-24 RX ADMIN — POTASSIUM CHLORIDE 20 MEQ: 1500 TABLET, EXTENDED RELEASE ORAL at 07:55

## 2025-01-24 RX ADMIN — CALCIUM CARBONATE 600 MG (1,500 MG)-VITAMIN D3 400 UNIT TABLET 1 TABLET: at 07:55

## 2025-01-24 RX ADMIN — ACETAMINOPHEN 650 MG: 325 TABLET ORAL at 06:21

## 2025-01-24 RX ADMIN — SODIUM CHLORIDE, PRESERVATIVE FREE 10 ML: 5 INJECTION INTRAVENOUS at 21:27

## 2025-01-24 RX ADMIN — SODIUM CHLORIDE, PRESERVATIVE FREE 10 ML: 5 INJECTION INTRAVENOUS at 07:56

## 2025-01-24 RX ADMIN — BUSPIRONE HYDROCHLORIDE 5 MG: 5 TABLET ORAL at 07:55

## 2025-01-24 RX ADMIN — APIXABAN 5 MG: 5 TABLET, FILM COATED ORAL at 21:25

## 2025-01-24 RX ADMIN — ASPIRIN 81 MG: 81 TABLET, COATED ORAL at 07:55

## 2025-01-24 RX ADMIN — FUROSEMIDE 40 MG: 40 TABLET ORAL at 07:55

## 2025-01-24 RX ADMIN — AMLODIPINE BESYLATE 10 MG: 10 TABLET ORAL at 07:55

## 2025-01-24 RX ADMIN — BUSPIRONE HYDROCHLORIDE 5 MG: 5 TABLET ORAL at 14:12

## 2025-01-24 RX ADMIN — GABAPENTIN 200 MG: 100 CAPSULE ORAL at 07:55

## 2025-01-24 RX ADMIN — SERTRALINE HYDROCHLORIDE 25 MG: 50 TABLET ORAL at 14:12

## 2025-01-24 ASSESSMENT — PAIN DESCRIPTION - ORIENTATION: ORIENTATION: LEFT

## 2025-01-24 ASSESSMENT — PAIN DESCRIPTION - DESCRIPTORS: DESCRIPTORS: ACHING

## 2025-01-24 ASSESSMENT — PAIN SCALES - GENERAL: PAINLEVEL_OUTOF10: 5

## 2025-01-24 ASSESSMENT — PAIN DESCRIPTION - LOCATION: LOCATION: NECK

## 2025-01-24 NOTE — CARE COORDINATION
Per PM&R physiatrist Dr. Marcus Rae, patient appropriate for Acute Inpatient Rehabilitation level of care.    Eligibility & Benefits Verified - See Note    Prior authorization request for admission initiated with primary insurance [employer] via: Phone Call: Call Ref# [id number], Representative: Estuardo    Pending Case Reference/Authorization # [id number]    Clinical documentation submitted via  Nurse to contact admissions coordinator for number to fax clinicals to.     Updated Roxi GARNER:  Perfect Serve  at this time.

## 2025-01-24 NOTE — PLAN OF CARE
Problem: Chronic Conditions and Co-morbidities  Goal: Patient's chronic conditions and co-morbidity symptoms are monitored and maintained or improved  Outcome: Progressing     Problem: Discharge Planning  Goal: Discharge to home or other facility with appropriate resources  1/24/2025 1756 by Flavia Montana RN  Outcome: Progressing     Problem: Skin/Tissue Integrity  Goal: Absence of new skin breakdown  Description: 1.  Monitor for areas of redness and/or skin breakdown  2.  Assess vascular access sites hourly  3.  Every 4-6 hours minimum:  Change oxygen saturation probe site  4.  Every 4-6 hours:  If on nasal continuous positive airway pressure, respiratory therapy assess nares and determine need for appliance change or resting period.  Outcome: Progressing     Problem: Safety - Adult  Goal: Free from fall injury  1/24/2025 1756 by Flavia Montana RN  Outcome: Progressing     Problem: ABCDS Injury Assessment  Goal: Absence of physical injury  Outcome: Progressing     Problem: Neurosensory - Adult  Goal: Achieves maximal functionality and self care  1/24/2025 1756 by Flavia Montana RN  Outcome: Progressing     Problem: Respiratory - Adult  Goal: Achieves optimal ventilation and oxygenation  1/24/2025 1756 by Flavia Montana RN  Outcome: Progressing     Problem: Skin/Tissue Integrity - Adult  Goal: Incisions, wounds, or drain sites healing without S/S of infection  1/24/2025 1756 by Flavia Montana RN  Outcome: Progressing     Problem: Genitourinary - Adult  Goal: Absence of urinary retention  1/24/2025 1756 by Flavia Montana RN  Outcome: Progressing     Problem: Infection - Adult  Goal: Absence of infection at discharge  1/24/2025 1756 by Flavia Montana RN  Outcome: Progressing

## 2025-01-24 NOTE — CARE COORDINATION
DISCHARGE PLANNING NOTE:    LSW following for potential discharge to ARU. PM&R completed. Patient is appropriate for inpatient rehab per Dr. Rae. Insurance authorization to be submitted by Cecelia in admissions today.

## 2025-01-24 NOTE — PLAN OF CARE
Problem: Discharge Planning  Goal: Discharge to home or other facility with appropriate resources  1/24/2025 0418 by eDanne Shafer RN  Outcome: Progressing     Problem: Safety - Adult  Goal: Free from fall injury  1/24/2025 0418 by Deanne Shafer RN  Outcome: Progressing     Problem: Neurosensory - Adult  Goal: Achieves maximal functionality and self care  1/24/2025 0418 by Deanne Shafer RN  Outcome: Progressing     Problem: Respiratory - Adult  Goal: Achieves optimal ventilation and oxygenation  1/24/2025 0418 by Deanne Shafer RN  Outcome: Progressing     Problem: Skin/Tissue Integrity - Adult  Goal: Incisions, wounds, or drain sites healing without S/S of infection  1/24/2025 0418 by Deanne Shafer RN  Outcome: Progressing     Problem: Genitourinary - Adult  Goal: Absence of urinary retention  1/24/2025 0418 by Deanne Shafer RN  Outcome: Progressing     Problem: Infection - Adult  Goal: Absence of infection at discharge  1/24/2025 0418 by Deanne Shafer RN  Outcome: Progressing

## 2025-01-24 NOTE — PROGRESS NOTES
[facility]   INPATIENT OCCUPATIONAL THERAPY  PROGRESS NOTE  Date: 2025  Patient Name: Criselda Tinoco       Room:   MRN: [id number]    : 1950  (75 y.o.)  Gender: female   Referring Practitioner: Jillian George MD  Diagnosis: UTI due to extended spectrum beta lactamase producing escherichia coli      Discharge Recommendations:  Further Occupational Therapy is recommended upon facility discharge.    OT Equipment Recommendations  Equipment Needed: Yes  Mobility Devices: Walker  Walker: Rolling  Other: TBD    Restrictions/Precautions  Restrictions/Precautions  Restrictions/Precautions: Fall Risk;General Precautions;Contact Precautions (ESBL contact)  Activity Level: Up as Tolerated;Up with Assist  Required Braces or Orthoses?: No  Implants Present? : Metal implants (Hardware in L wrist; neck hardware; lower back cage)    O2 Device: None (Room air)    Subjective  Subjective  Subjective: pt  motivated to participate with skilled servces.  Pain  Pre-Pain: 0 (declines pain)  Comments: [facility]    Objective  Orientation  Overall Orientation Status: Within Functional Limits  Orientation Level: Oriented X4  Cognition  Overall Cognitive Status: Exceptions  Arousal/Alertness: Appears intact  Following Commands: Follows one step commands with repetition;Follows one step commands with increased time  Attention Span: Attends with cues to redirect  Safety Judgement: Decreased awareness of need for assistance;Decreased awareness of need for safety  Problem Solving: Assistance required to generate solutions;Assistance required to implement solutions  Insights: Decreased awareness of deficits  Initiation: Requires cues for some  Sequencing: Requires cues for some    Activities of Daily Living  Toileting: Other (Comment) (mod A X 2)  Toileting Skilled Clinical Factors: Pt requeired mod A X 2 for t/f to and from dbed side commode  Functional Mobility: Other

## 2025-01-24 NOTE — PROGRESS NOTES
[facility] -   Infectious diseases evaluation  admission date 1/21/2025    reason for consultation:   Uti,esbl    Impression :   Current:  Dysuria resolved/doubt UTI  Coronary artery disease  History of C. difficile colitis  History of CVA with left-sided weakness  CHF  Hypertension  Hyperlipidemia  History of ESBL  Allergy to Sulfa-Confusion    HENCE:   Monitor off antibiotics.  She received 6 doses of IV meropenem, discontinued 1/23/2025, started 1/21/2025   She received oral vancomycin for prophylaxis 1/22/2025 through 1/23/2025  No objection for discharge from infectious disease point of view, likely acute rehab.  Supportive care.  Discussed with patient.    Infection Control Recommendations   [place] Precautions  Contact Isolation       Antimicrobial Stewardship Recommendations   Simplification of therapy  Targeted therapy      History of Present Illness:   Initial history:  Criselda Tinoco is a 75 y.o.-year-old female presented to hospital with dysuria, frequency and urgency with urination and not feeling well for several days.  She also complaining of left shoulder pain that is chronic, denied fever or chills, denied nausea or vomiting, no diarrhea, no other complaints.  She does have chronic lower extremity edema.    Initial WBC 6.5, creatinine 0.8  History of C. difficile colitis    Interval changes  1/25/2025   Patient Vitals for the past 8 hrs:   BP Temp Temp src Pulse Resp SpO2   01/25/25 0604 (!) 152/62 97.5 °F (36.4 °C) Oral 66 14 96 %     Afebrile.  Resting comfortably in bed.  Denies any pain, fever, chills, n/v/d, dysuria.        I have personally reviewed the past medical history, past surgical history, medications, social history, and family history, and I haveupdated the database accordingly.      Allergies:   Fentanyl, Bactrim [sulfamethoxazole-trimethoprim], Cat dander, Codeine, Diazepam, Meperidine hcl, and Seasonal     Review of Systems:  Patient declined     Frequency of Social Gatherings with Friends and Family: Patient declined     Attends Synagogue Services: Patient declined     Active Member of Clubs or Organizations: Patient declined     Attends Club or Organization Meetings: Patient declined     Marital Status: Patient declined   Intimate Partner Violence: Unknown (2/22/2024)    Received from [facility], [facility]     Fear of Current or Ex-Partner: Not on file     Emotionally Abused: Not on file     Physically Abused: Not on file     Sexually Abused: Not on file     Physically or Sexually Abused: Not on file   Housing Stability: Low Risk  (1/21/2025)    Housing Stability Vital Sign     Unable to Pay for Housing in the Last Year: No     Number of Times Moved in the Last Year: 0     Homeless in the Last Year: No       Family History:     Family History   Problem Relation Age of Onset    Stroke Mother     Diabetes Mother     Heart Disease Mother     High Blood Pressure Mother     Heart Disease Father     Heart Disease Brother     High Blood Pressure Brother     Heart Disease Maternal Grandmother     High Blood Pressure Sister       Medical Decision Making:   I have independently reviewed/ordered the following labs:    CBC with Differential:   Recent Labs     01/23/25  0549 01/24/25  0629   WBC 7.9 6.9   HGB 12.5 13.5   HCT 37.3 40.1    233   LYMPHOPCT 20* 24   MONOPCT 9* 9*   EOSPCT 9* 12*     BMP:  Recent Labs     01/23/25  0549 01/24/25  0629    141   K 3.9 3.9    103   CO2 26 27   BUN 19 19   CREATININE 0.7 0.8     Hepatic Function Panel: No results for input(s): \"LABALBU\", \"BILIDIR\", \"IBILI\", \"BILITOT\", \"ALKPHOS\", \"ALT\", \"AST\" in the last 72 hours.    Invalid input(s): \"PROT\"  No results for input(s): \"RPR\" in the last 72 hours.  No results for input(s): \"HIV\" in the last 72 hours.  No results for input(s): \"BC\" in the last 72 hours.  Lab Results   Component Value

## 2025-01-24 NOTE — PROGRESS NOTES
[facility] -   Infectious diseases evaluation  admission date 1/21/2025    reason for consultation:   Uti,esbl    Impression :   Current:  Dysuria resolved/doubt UTI  Coronary artery disease  History of C. difficile colitis  History of CVA with left-sided weakness  CHF  Hypertension  Hyperlipidemia  History of ESBL    HENCE:     She received 6 doses of IV meropenem, discontinued 1/23/2025, started 1/21/2025   She received oral vancomycin for prophylaxis 1/22/2025 through 1/23/2025  UA 1/22/2025 showed small leukocyte esterase, 3-5 WBC, no growth on culture to date  No objection for discharge from infectious disease point of view, likely acute rehab.    Infection Control Recommendations   [place] Precautions  Contact Isolation       Antimicrobial Stewardship Recommendations   Simplification of therapy  Targeted therapy      History of Present Illness:   Initial history:  Criselda Tinoco is a 75 y.o.-year-old female presented to hospital with dysuria, frequency and urgency with urination and not feeling well for several days.  She also complaining of left shoulder pain that is chronic, denied fever or chills, denied nausea or vomiting, no diarrhea, no other complaints.  She does have chronic lower extremity edema.    Initial WBC 6.5, creatinine 0.8  History of C. difficile colitis  Interval changes  1/24/2025   She was seen and examined, denied urinary symptoms, no fever or chills, no other complaints.  WBC 6 point  No growth on urine culture from 1/22/25  Patient Vitals for the past 8 hrs:   BP Temp Temp src Pulse Resp SpO2   01/24/25 0616 134/60 98.1 °F (36.7 °C) Oral 63 16 96 %             I have personally reviewed the past medical history, past surgical history, medications, social history, and family history, and I haveupdated the database accordingly.      Allergies:   Fentanyl, Bactrim [sulfamethoxazole-trimethoprim], Cat dander, Codeine, Diazepam, Meperidine

## 2025-01-24 NOTE — PROGRESS NOTES
[facility] Medicine  Jm Epstein MD; MD Beau Louise MD; Sagrario Navarro MD; Jillian George MD    [facility] Medicine   IN-PATIENT SERVICE   [facility]    Progress Note            Date:   1/24/2025  Patient name:  Criselda Tinoco  Date of admission:  1/21/2025  5:00 PM  MRN:   [id number]  Account:  [id number]  YOB: 1950  PCP:    Wood St MD  Room:   2071/2071-01  Code Status:    Full Code    Chief Complaint:     Chief Complaint   Patient presents with    Dysuria        History Obtained From:     patient, electronic medical record    History of Present Illness:     Criselda Tinoco is a 75 y.o. Non- / non  female who presents with Dysuria   and is admitted to the hospital for the management of UTI due to extended-spectrum beta lactamase (ESBL) producing Escherichia coli.  Criselda Tinoco is a 75 y.o. Non- / non  female with a history of C. 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     According to patient and her family she has been feeling unwell for the past several weeks.  She had her urine tested at the [facility].  She complains of dysuria urgency, and and frequency.  [facility] and stated that her urine culture grew Proteus Mirabella with ESBL and E. coli.     The patient stated that she had surgery on her neck and has had difficulty with her activities of daily living since.  She states that she works faithfully with outpatient therapy.  She also endorses not taking her Lasix because she has difficulty making it to the restroom in time.  Upon assessment 1+ pitting edema was noted in her bilateral lower extremities.  Her abdomen was soft and diffusely

## 2025-01-24 NOTE — PROGRESS NOTES
Physical Medicine & Rehabilitation  Progress Note    1/24/2025 3:09 PM     CC: Ambulatory and ADL dysfunction due to debility status post UTI with history of CVA    Subjective:   No complaints better spirits.  Agreeable to rehab    ROS:  Denies fevers, chills, sweats.  No chest pain, palpitations, lightheadedness.  Denies coughing, wheezing or shortness of breath.  Denies abdominal pain, nausea, diarrhea or constipation.  No new areas of joint pain.  Denies new areas of numbness or weakness.  Denies new anxiety or depression issues.  No new skin problems.    Rehabilitation:   PT:    Bed mobility  Supine to Sit: Moderate assistance  Sit to Supine: Unable to assess (pt left up in recliner)  Scooting: Moderate assistance  Bed Mobility Comments: Max A x1 bed mobility with HOB elevated and use of hand rail    Transfers  Sit to Stand: Moderate Assistance, 2 Person Assistance (Cues for safe handplacement proper foot positioning, and use of forward momentum)  Stand to Sit: Minimal Assistance, 2 Person Assistance (cues for safe hand placement. Cues for eccentric control)  Bed to Chair: Moderate assistance, 2 Person Assistance (~3 steps + pivot to recliner)  Comment: pt required modA to stand from bed and CGA in natan stedy (pt stands with significant posterior lean with RW requiring mod/maxA to correct)    Ambulation  Surface: Level tile  Device: Rolling Walker  Assistance: 2 Person assistance, Moderate assistance  Quality of Gait: Max cues for sequencing, posterior lean present, downward gaze, L knee hyperextension with fatigue  Gait Deviations: Slow Alicia, Decreased step height, Decreased step length  Distance: ~3ft bed to chair, ~4ft x2 from recliner to commode and back  Comments: increased assist and cueing as pt begins to fatigue                OT:  ADL  Feeding: Setup, Based on clinical judgement  Grooming: Stand by assistance, Based on clinical judgement  UE Bathing: Minimal assistance, Based on clinical  judgement  LE Bathing: Maximum assistance, Based on clinical judgement  UE Dressing: Moderate assistance, Based on clinical judgement  LE Dressing: Dependent/Total, Based on clinical judgement  Putting On/Taking Off Footwear: Dependent/Total  Putting On/Taking Off Footwear Skilled Clinical Factors: TA to don footies  Toileting: Other (Comment) (mod A X 2)  Toileting Skilled Clinical Factors: Pt requeired mod A X 2 for t/f to and from dbed side commode  Functional Mobility: Other (Comment) (mod AX2)  Functional Mobility Skilled Clinical Factors: mod A X 2 with RW with education on RW safety and sequencing of t/f.  Additional Comments: ADL scores are based on skilled observation unless otherwise noted. Pt is currently limited by decreased strength, endurance, activity tolerance, impaired balance, general weakness and cognitive barriers which impacts the pt's ability to safely and independently complete self-care tasks  Product Used : Soap and water          ST:        Objective:  /60   Pulse 63   Temp 98.1 °F (36.7 °C) (Oral)   Resp 16   Ht 1.6 m (5' 3\")   Wt 87.2 kg (192 lb 3.9 oz)   SpO2 96%   BMI 34.05 kg/m²  I Body mass index is 34.05 kg/m². I   Wt Readings from Last 1 Encounters:   25 87.2 kg (192 lb 3.9 oz)      Temp (24hrs), Av.9 °F (36.6 °C), Min:97.7 °F (36.5 °C), Max:98.1 °F (36.7 °C)         GEN: well developed, well nourished, no acute distress  HEENT: Normocephalic atraumatic, EOMI, mucous membranes pink and moist  CV: RRR, no murmurs, rubs or gallops  PULM: CTAB, no rales or rhonchi. Respirations WNL and unlabored  ABD: soft, NT, ND, +BS and equal  NEURO: A&O x3. Sensation intact to light touch currently knew year president location.   MSK: At least 4/5 upper and distal lower extremities able to partially straight leg raise appears to be slightly stronger in the right compared to left  EXTREMITIES: No calf tenderness to palpation bilaterally.  SKIN: warm dry and intact with good

## 2025-01-24 NOTE — PROGRESS NOTES
Physical Therapy  [facility]   Physical Therapy Treatment  Date: 25  Patient Name: Criselda Tinoco       Room: -  MRN: [id number]  Account: [id number]   : 1950  (75 y.o.) Gender: female     Discharge Recommendations:  Discharge Recommendations: Patient would benefit from continued therapy after discharge, Therapy recommended at discharge     PT Equipment Recommendations  Equipment Needed: No    General  Patient assessed for rehabilitation services?: Yes  Family/Caregiver Present: Yes  Follows Commands: Within Functional Limits    Past Medical History:  has a past medical history of Allergic rhinitis, cause unspecified, Back pain, Bowel obstruction ([facility]), C. difficile diarrhea, CAD (coronary artery disease), Cardiac murmur, Cellulitis, Cerebral artery occlusion with cerebral infarction ([facility], located within [facility]), COVID-19, Diverticulosis of colon (without mention of hemorrhage), GERD (gastroesophageal reflux disease), History of blood transfusion, History of CHF (congestive heart failure), History of MI (myocardial infarction), History of ovarian cyst, History of peritonitis, HTN (hypertension), Hx of blood clots, Hyperlipidemia, Intestinal or peritoneal adhesions with obstruction (postoperative) (postinfection) (HCC), Kidney infection, Lateral epicondylitis  of elbow, MDRO (multiple drug resistant organisms) resistance, Muscle strain, Other abnormal glucose, PONV (postoperative nausea and vomiting), Pre-diabetes, Restless legs syndrome (RLS), Snores, 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  Past Surgical History:   has a past surgical history that includes Ovary removal (); colectomy (); Appendectomy (); Ovary removal (); Hysterectomy (); Bunionectomy (Left); sinus surgery (); Colonoscopy;

## 2025-01-24 NOTE — CARE COORDINATION
ACUTE INPATIENT REHABILITATION  Financial Disclosure Statement: Eligibility and Benefit Verification    Patient Name: Criselda Tinoco MRN: [id number]     Disclosure Statement  [facility] Acute Inpatient Rehabilitation at [facility] provides 24 hour individualized service to patients with functional limitations due to but not limited to stroke, brain injury, spinal cord injury, major multiple trauma, hip fracture, amputation, and neurological disorders.  Acute Inpatient Rehabilitation provides rehabilitative nursing, physician coverage, as well as physical therapy, occupational therapy, speech therapy, recreational therapy and other services as deemed necessary by our Board Certified Physical Medicine and Rehabilitation Physicians Dr. Marcus Rae, Dr. Luana Robles, Dr. Oxana Banks and Dr. Taiwo Edwards.    [facility] Acute Inpatient Rehabilitation at [facility] is fully accredited by the Commission on Accreditation of Rehabilitation Facilities (CARF) and The Joint Commission (TJC).    At a minimum, you will receive 5 days of therapy services totaling at least 15 hours per week. Your treatment program will consist of physical therapy at least 7.5 hours per week; occupational therapy 7.5 hours per week; and speech therapy 1.5 hours per week, as applicable.    Your estimated length of stay is currently:  Approximately 2 Weeks  Please Note: Estimated length of stay is based on individual condition and Acute Inpatient Rehabilitation specific needs. Length of stay may vary based upon interdisciplinary team assessment, insurance approval, and patient progression.    Your insurance coverage has been verified as follows:   Date Verified: 01/24/2025   Method:  Online Portal    Primary Insurance: [employer]   Member/Subscriber ID:  [id number]  Coverage: Per Benefit Period  Plan Effective Date: 01/01/2025 Status: Active   Deductible: $0.00   Co-Pay: $0.00  Co-Insurance: 50%  Maximum Out of

## 2025-01-25 PROBLEM — Z86.19 HISTORY OF INFECTION DUE TO EXTENDED SPECTRUM BETA LACTAMASE (ESBL) PRODUCING BACTERIA: Status: ACTIVE | Noted: 2025-01-25

## 2025-01-25 PROBLEM — R30.0 DYSURIA: Status: ACTIVE | Noted: 2025-01-25

## 2025-01-25 PROCEDURE — 6370000000 HC RX 637 (ALT 250 FOR IP)

## 2025-01-25 PROCEDURE — 2500000003 HC RX 250 WO HCPCS

## 2025-01-25 PROCEDURE — 97110 THERAPEUTIC EXERCISES: CPT

## 2025-01-25 PROCEDURE — 99232 SBSQ HOSP IP/OBS MODERATE 35: CPT | Performed by: NURSE PRACTITIONER

## 2025-01-25 PROCEDURE — 99232 SBSQ HOSP IP/OBS MODERATE 35: CPT | Performed by: INTERNAL MEDICINE

## 2025-01-25 PROCEDURE — 97535 SELF CARE MNGMENT TRAINING: CPT

## 2025-01-25 PROCEDURE — 1200000000 HC SEMI PRIVATE

## 2025-01-25 PROCEDURE — 97530 THERAPEUTIC ACTIVITIES: CPT

## 2025-01-25 RX ADMIN — APIXABAN 5 MG: 5 TABLET, FILM COATED ORAL at 08:48

## 2025-01-25 RX ADMIN — CARVEDILOL 25 MG: 25 TABLET, FILM COATED ORAL at 15:46

## 2025-01-25 RX ADMIN — APIXABAN 5 MG: 5 TABLET, FILM COATED ORAL at 19:57

## 2025-01-25 RX ADMIN — CALCIUM CARBONATE 600 MG (1,500 MG)-VITAMIN D3 400 UNIT TABLET 1 TABLET: at 08:49

## 2025-01-25 RX ADMIN — BUSPIRONE HYDROCHLORIDE 5 MG: 5 TABLET ORAL at 19:57

## 2025-01-25 RX ADMIN — SERTRALINE HYDROCHLORIDE 25 MG: 50 TABLET ORAL at 15:45

## 2025-01-25 RX ADMIN — SODIUM CHLORIDE, PRESERVATIVE FREE 10 ML: 5 INJECTION INTRAVENOUS at 08:50

## 2025-01-25 RX ADMIN — GABAPENTIN 200 MG: 100 CAPSULE ORAL at 08:49

## 2025-01-25 RX ADMIN — SODIUM CHLORIDE, PRESERVATIVE FREE 10 ML: 5 INJECTION INTRAVENOUS at 19:58

## 2025-01-25 RX ADMIN — CARVEDILOL 25 MG: 25 TABLET, FILM COATED ORAL at 08:49

## 2025-01-25 RX ADMIN — SERTRALINE HYDROCHLORIDE 25 MG: 50 TABLET ORAL at 08:48

## 2025-01-25 RX ADMIN — ASPIRIN 81 MG: 81 TABLET, COATED ORAL at 08:49

## 2025-01-25 RX ADMIN — FUROSEMIDE 40 MG: 40 TABLET ORAL at 08:49

## 2025-01-25 RX ADMIN — BACLOFEN 10 MG: 10 TABLET ORAL at 08:48

## 2025-01-25 RX ADMIN — MICONAZOLE NITRATE: 2 POWDER TOPICAL at 08:49

## 2025-01-25 RX ADMIN — ACETAMINOPHEN 650 MG: 325 TABLET ORAL at 19:57

## 2025-01-25 RX ADMIN — AMLODIPINE BESYLATE 10 MG: 10 TABLET ORAL at 08:49

## 2025-01-25 RX ADMIN — BUSPIRONE HYDROCHLORIDE 5 MG: 5 TABLET ORAL at 15:45

## 2025-01-25 RX ADMIN — MICONAZOLE NITRATE: 2 POWDER TOPICAL at 19:58

## 2025-01-25 RX ADMIN — GABAPENTIN 200 MG: 100 CAPSULE ORAL at 19:57

## 2025-01-25 RX ADMIN — HYDRALAZINE HYDROCHLORIDE 25 MG: 25 TABLET ORAL at 08:49

## 2025-01-25 RX ADMIN — BUSPIRONE HYDROCHLORIDE 5 MG: 5 TABLET ORAL at 08:49

## 2025-01-25 RX ADMIN — SERTRALINE HYDROCHLORIDE 25 MG: 50 TABLET ORAL at 19:57

## 2025-01-25 RX ADMIN — Medication 6 MG: at 19:58

## 2025-01-25 RX ADMIN — HYDRALAZINE HYDROCHLORIDE 25 MG: 25 TABLET ORAL at 19:57

## 2025-01-25 RX ADMIN — POTASSIUM CHLORIDE 20 MEQ: 1500 TABLET, EXTENDED RELEASE ORAL at 08:49

## 2025-01-25 RX ADMIN — BACLOFEN 10 MG: 10 TABLET ORAL at 19:57

## 2025-01-25 RX ADMIN — ATORVASTATIN CALCIUM 80 MG: 80 TABLET, FILM COATED ORAL at 08:48

## 2025-01-25 ASSESSMENT — PAIN DESCRIPTION - ORIENTATION: ORIENTATION: POSTERIOR

## 2025-01-25 ASSESSMENT — PAIN SCALES - WONG BAKER: WONGBAKER_NUMERICALRESPONSE: NO HURT

## 2025-01-25 ASSESSMENT — PAIN DESCRIPTION - DESCRIPTORS: DESCRIPTORS: ACHING;DISCOMFORT

## 2025-01-25 ASSESSMENT — PAIN DESCRIPTION - LOCATION: LOCATION: NECK

## 2025-01-25 ASSESSMENT — PAIN SCALES - GENERAL: PAINLEVEL_OUTOF10: 8

## 2025-01-25 NOTE — PROGRESS NOTES
[facility]   INPATIENT OCCUPATIONAL THERAPY  PROGRESS NOTE  Date: 2025  Patient Name: Criselda Tinoco       Room:   MRN: [id number]    : 1950  (75 y.o.)  Gender: female   Referring Practitioner: Jillian George MD  Diagnosis: UTI due to extended spectrum beta lactamase producing escherichia coli      Discharge Recommendations:  Further Occupational Therapy is recommended upon facility discharge.    OT Equipment Recommendations  Equipment Needed: Yes  Mobility Devices: Walker  Walker: Rolling  Other: TBD    Restrictions/Precautions  Restrictions/Precautions  Restrictions/Precautions: Fall Risk;General Precautions;Contact Precautions (ESBL contact)  Activity Level: Up as Tolerated;Up with Assist  Required Braces or Orthoses?: No  Implants Present? : Metal implants (Hardware in L wrist; neck hardware; lower back cage)    O2 Device: None (Room air)    Subjective  Subjective  Subjective: Pt is pleasant and cooperative       Objective  Orientation  Overall Orientation Status: Within Functional Limits  Orientation Level: Oriented X4  Cognition  Overall Cognitive Status: Exceptions  Arousal/Alertness: Appears intact  Following Commands: Follows one step commands with repetition;Follows one step commands with increased time  Attention Span: Attends with cues to redirect  Memory: Appears intact  Safety Judgement: Decreased awareness of need for assistance;Decreased awareness of need for safety  Problem Solving: Assistance required to generate solutions;Assistance required to implement solutions  Insights: Decreased awareness of deficits  Initiation: Requires cues for some  Sequencing: Requires cues for some    Activities of Daily Living  Toileting: Moderate assistance;Minimal assistance  Toileting Skilled Clinical Factors: standing in natan stedy, pt able to use R hand to wipe buttocks, assist from writer for thoroughness    Balance  Balance  Sitting Balance: Contact guard

## 2025-01-25 NOTE — PROGRESS NOTES
Physical Therapy  Facility/Department: [facility] MED SURG  Physical Therapy  Progress Note    Name: Criselda Tinoco  : 1950  MRN: [id number]  Date of Service: 2025    Discharge Recommendations:  Patient would benefit from continued therapy after discharge, Therapy recommended at discharge   PT Equipment Recommendations  Equipment Needed: No      Patient Diagnosis(es): The encounter diagnosis was UTI due to extended-spectrum beta lactamase (ESBL) producing Escherichia coli.  Past Medical History:  has a past medical history of Allergic rhinitis, cause unspecified, Back pain, Bowel obstruction (HCC), C. difficile diarrhea, CAD (coronary artery disease), Cardiac murmur, Cellulitis, Cerebral artery occlusion with cerebral infarction (HCC), COVID-19, Diverticulosis of colon (without mention of hemorrhage), GERD (gastroesophageal reflux disease), History of blood transfusion, History of CHF (congestive heart failure), History of MI (myocardial infarction), History of ovarian cyst, History of peritonitis, HTN (hypertension), Hx of blood clots, Hyperlipidemia, Intestinal or peritoneal adhesions with obstruction (postoperative) (postinfection) (HCC), Kidney infection, Lateral epicondylitis  of elbow, MDRO (multiple drug resistant organisms) resistance, Muscle strain, Other abnormal glucose, PONV (postoperative nausea and vomiting), Pre-diabetes, Restless legs syndrome (RLS), Snores, 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  Past Surgical History:  has a past surgical history that includes Ovary removal (); colectomy (); Appendectomy (); Ovary removal (); Hysterectomy (); Bunionectomy (Left); sinus surgery (); Colonoscopy; other surgical history (2014); cyst removal (Right); Wrist fracture surgery (Left,  Lot  How much help is needed moving to and from a bed to a chair?: Total  How much help is needed standing up from a chair using your arms?: Total  How much help is needed walking in hospital room?: Total  How much help is needed climbing 3-5 steps with a railing?: Total  AM-PAC Inpatient Mobility Raw Score : 9  AM-[facility] Inpatient T-Scale Score : 30.55  Mobility Inpatient CMS 0-100% Score: 81.38  Mobility Inpatient CMS G-Code Modifier : CM         Goals  Short Term Goals  Time Frame for Short Term Goals: 2-3 days  Short Term Goal 1: pt able to perform bed mobility with min/modA  Short Term Goal 2: pt able to stand with RW and Jennifer from various surfaces  Short Term Goal 3: pt able to transfer to/from bed/chair with RW and modA of 1  Short Term Goal 4: Improve sitting/standing balance to Fair to improve safety of transfrs/ADLs       Therapy Time   Individual Concurrent Group Co-treatment   Time In 1426         Time Out 1456         Minutes 30                 Angelika Beatty, PTA

## 2025-01-25 NOTE — CARE COORDINATION
ONGOING DISCHARGE PLANNING NOTE:    Writer reviewed LSW notes, and discharge plan is ARU with insurance authorization started 1/24.    Will continue to follow for additional discharge needs.    Electronically signed by Lesly Polanco RN on 1/25/2025 at 10:52 AM

## 2025-01-25 NOTE — DISCHARGE INSTR - COC
Continuity of Care Form    Patient Name: Criselda Tinoco   :  1950  MRN:  [id number]    Admit date:  2025  Discharge date:  25    Code Status Order: Full Code   Advance Directives:   Advance Care Flowsheet Documentation             Admitting Physician:  Jillian George MD  PCP: Wood St MD    Discharging Nurse: MEENU Cronin  Discharging Hospital Unit/Room#: -  Discharging Unit Phone Number: [phone]    Emergency Contact:   Extended Emergency Contact Information  Primary Emergency Contact: True Tinoco  Address: [address] [place] [address] [address]           [address]  Home Phone: [phone]  Mobile Phone: [phone]  Relation: Spouse  Hearing or visual needs: None  Other needs: None  Preferred language: English   needed? No  Secondary Emergency Contact: Lesly Clark  Home Phone: [phone]  Mobile Phone: [phone]  Relation: Child    Past Surgical History:  Past Surgical History:   Procedure Laterality Date    ABDOMEN SURGERY      benign tumor removed near remaining ovary, 1.5 pounds    APPENDECTOMY      appendix ruptured, developed peritonitis    BACK SURGERY      BUNIONECTOMY Left     along with calcium deposits removed    CARDIAC CATHETERIZATION      negative    CERVICAL FUSION N/A 2021    POSTERIOR C3-6 LAMINECTOMY, PARTIAL C7 LAMINECTOMY, FUSION C3-C6, SILVERCORD performed by Malena Guy DO at [facility] OR    CERVICAL FUSION N/A 2023    ANTERIOR CERVICAL DISCECTOMY, OSTEOTOMY, FUSION,  C6-7, CORRECTION OF DEFORMITY. performed by Malena Guy DO at [facility] OR    CERVICAL FUSION N/A 2023    POSTERIOR CERVICAL OSTEOTOMY C6-7, REMOVAL OF PREVIOUS HARDWARE, CERVICAL FUSION C2-T2, CORRECTION OF DEFORMITY. performed by Malena Guy DO at [facility] OR    COLECTOMY  1969    12 INCHES REMOVED D/T OBSTRUCTION    COLONOSCOPY      CYST REMOVAL Right     right facial    FINGER CLOSED REDUCTION Left 2022    CLOSED REDUCTION

## 2025-01-25 NOTE — PLAN OF CARE
Problem: Chronic Conditions and Co-morbidities  Goal: Patient's chronic conditions and co-morbidity symptoms are monitored and maintained or improved  1/25/2025 0459 by Oxana Phillips RN  Outcome: Progressing  Flowsheets (Taken 1/23/2025 0851 by Destiny Anne, RN)  Care Plan - Patient's Chronic Conditions and Co-Morbidity Symptoms are Monitored and Maintained or Improved: Monitor and assess patient's chronic conditions and comorbid symptoms for stability, deterioration, or improvement  1/24/2025 1756 by Flavia Montana RN  Outcome: Progressing     Problem: Discharge Planning  Goal: Discharge to home or other facility with appropriate resources  1/25/2025 0459 by Oxana Phillips RN  Outcome: Progressing  Flowsheets (Taken 1/23/2025 0851 by Destiny Anne, RN)  Discharge to home or other facility with appropriate resources:   Identify barriers to discharge with patient and caregiver   Arrange for needed discharge resources and transportation as appropriate   Identify discharge learning needs (meds, wound care, etc)   Refer to discharge planning if patient needs post-hospital services based on physician order or complex needs related to functional status, cognitive ability or social support system  1/24/2025 1756 by Flavia Montana RN  Outcome: Progressing     Problem: Skin/Tissue Integrity  Goal: Absence of new skin breakdown  Description: 1.  Monitor for areas of redness and/or skin breakdown  2.  Assess vascular access sites hourly  3.  Every 4-6 hours minimum:  Change oxygen saturation probe site  4.  Every 4-6 hours:  If on nasal continuous positive airway pressure, respiratory therapy assess nares and determine need for appliance change or resting period.  1/25/2025 0459 by Oaxna Phillips, RN  Outcome: Progressing  1/24/2025 1756 by Flavia Montana RN  Outcome: Progressing     Problem: Safety - Adult  Goal: Free from fall injury  1/25/2025 0459 by Oxana Phillips, RN  Outcome: Progressing  Flowsheets

## 2025-01-25 NOTE — PROGRESS NOTES
[facility] Internal Medicine  Jm Epstein MD; MD Beau Louise MD; Sagrario Navarro MD; Jillian George MD    [facility] Internal Medicine   IN-PATIENT SERVICE   [facility]    Progress Note            Date:   1/25/2025  Patient name:  Criselda Tinoco  Date of admission:  1/21/2025  5:00 PM  MRN:   [id number]  Account:  [id number]  YOB: 1950  PCP:    Wood [address] MD  Room:   2071/2071-01  Code Status:    Full Code    Chief Complaint:     Chief Complaint   Patient presents with    Dysuria        History Obtained From:     patient, electronic medical record    History of Present Illness:     Criselda Tinoco is a 75 y.o. Non- / non  female who presents with Dysuria   and is admitted to the hospital for the management of UTI due to extended-spectrum beta lactamase (ESBL) producing Escherichia coli.  Criselda Tinoco is a 75 y.o. Non- / non  female with a history of C. 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     According to patient and her family she has been feeling unwell for the past several weeks.  She had her urine tested at the [facility].  She complains of dysuria urgency, and and frequency.  [facility] called her today and stated that her urine culture grew Proteus Mirabella with ESBL and E. coli.     The patient stated that she had surgery on her neck and has had difficulty with her activities of daily living since.  She states that she works faithfully with outpatient therapy.  She also endorses not taking her Lasix because she has difficulty making it to the restroom in time.  Upon assessment 1+ pitting edema was noted in her bilateral lower extremities.  Her abdomen was soft and diffusely

## 2025-01-26 PROCEDURE — 97535 SELF CARE MNGMENT TRAINING: CPT

## 2025-01-26 PROCEDURE — 99232 SBSQ HOSP IP/OBS MODERATE 35: CPT | Performed by: NURSE PRACTITIONER

## 2025-01-26 PROCEDURE — 2500000003 HC RX 250 WO HCPCS

## 2025-01-26 PROCEDURE — 6370000000 HC RX 637 (ALT 250 FOR IP)

## 2025-01-26 PROCEDURE — 97116 GAIT TRAINING THERAPY: CPT

## 2025-01-26 PROCEDURE — 97530 THERAPEUTIC ACTIVITIES: CPT

## 2025-01-26 PROCEDURE — 1200000000 HC SEMI PRIVATE

## 2025-01-26 PROCEDURE — 99232 SBSQ HOSP IP/OBS MODERATE 35: CPT | Performed by: INTERNAL MEDICINE

## 2025-01-26 PROCEDURE — 97110 THERAPEUTIC EXERCISES: CPT

## 2025-01-26 RX ADMIN — ATORVASTATIN CALCIUM 80 MG: 80 TABLET, FILM COATED ORAL at 10:37

## 2025-01-26 RX ADMIN — SODIUM CHLORIDE, PRESERVATIVE FREE 10 ML: 5 INJECTION INTRAVENOUS at 10:42

## 2025-01-26 RX ADMIN — ACETAMINOPHEN 650 MG: 325 TABLET ORAL at 10:36

## 2025-01-26 RX ADMIN — POTASSIUM CHLORIDE 20 MEQ: 1500 TABLET, EXTENDED RELEASE ORAL at 10:38

## 2025-01-26 RX ADMIN — GABAPENTIN 200 MG: 100 CAPSULE ORAL at 10:37

## 2025-01-26 RX ADMIN — CARVEDILOL 25 MG: 25 TABLET, FILM COATED ORAL at 18:12

## 2025-01-26 RX ADMIN — GABAPENTIN 200 MG: 100 CAPSULE ORAL at 22:05

## 2025-01-26 RX ADMIN — APIXABAN 5 MG: 5 TABLET, FILM COATED ORAL at 22:06

## 2025-01-26 RX ADMIN — ACETAMINOPHEN 650 MG: 325 TABLET ORAL at 18:12

## 2025-01-26 RX ADMIN — HYDRALAZINE HYDROCHLORIDE 25 MG: 25 TABLET ORAL at 10:39

## 2025-01-26 RX ADMIN — BUSPIRONE HYDROCHLORIDE 5 MG: 5 TABLET ORAL at 10:38

## 2025-01-26 RX ADMIN — MICONAZOLE NITRATE: 2 POWDER TOPICAL at 10:44

## 2025-01-26 RX ADMIN — CARVEDILOL 25 MG: 25 TABLET, FILM COATED ORAL at 10:42

## 2025-01-26 RX ADMIN — FUROSEMIDE 40 MG: 40 TABLET ORAL at 10:37

## 2025-01-26 RX ADMIN — Medication 6 MG: at 22:06

## 2025-01-26 RX ADMIN — BACLOFEN 10 MG: 10 TABLET ORAL at 22:06

## 2025-01-26 RX ADMIN — AMLODIPINE BESYLATE 10 MG: 10 TABLET ORAL at 10:37

## 2025-01-26 RX ADMIN — BACLOFEN 10 MG: 10 TABLET ORAL at 10:38

## 2025-01-26 RX ADMIN — APIXABAN 5 MG: 5 TABLET, FILM COATED ORAL at 10:38

## 2025-01-26 RX ADMIN — ASPIRIN 81 MG: 81 TABLET, COATED ORAL at 10:39

## 2025-01-26 RX ADMIN — BUSPIRONE HYDROCHLORIDE 5 MG: 5 TABLET ORAL at 14:42

## 2025-01-26 RX ADMIN — SERTRALINE HYDROCHLORIDE 25 MG: 50 TABLET ORAL at 22:06

## 2025-01-26 RX ADMIN — HYDRALAZINE HYDROCHLORIDE 25 MG: 25 TABLET ORAL at 14:42

## 2025-01-26 RX ADMIN — CALCIUM CARBONATE 600 MG (1,500 MG)-VITAMIN D3 400 UNIT TABLET 1 TABLET: at 10:38

## 2025-01-26 RX ADMIN — MICONAZOLE NITRATE: 2 POWDER TOPICAL at 22:06

## 2025-01-26 RX ADMIN — SERTRALINE HYDROCHLORIDE 25 MG: 50 TABLET ORAL at 10:38

## 2025-01-26 RX ADMIN — BUSPIRONE HYDROCHLORIDE 5 MG: 5 TABLET ORAL at 22:05

## 2025-01-26 RX ADMIN — SERTRALINE HYDROCHLORIDE 25 MG: 50 TABLET ORAL at 14:42

## 2025-01-26 RX ADMIN — HYDRALAZINE HYDROCHLORIDE 25 MG: 25 TABLET ORAL at 22:06

## 2025-01-26 ASSESSMENT — PAIN DESCRIPTION - LOCATION
LOCATION: HEAD

## 2025-01-26 ASSESSMENT — PAIN SCALES - GENERAL
PAINLEVEL_OUTOF10: 0
PAINLEVEL_OUTOF10: 8
PAINLEVEL_OUTOF10: 7
PAINLEVEL_OUTOF10: 6

## 2025-01-26 ASSESSMENT — PAIN DESCRIPTION - ORIENTATION
ORIENTATION: POSTERIOR
ORIENTATION: OTHER (COMMENT)

## 2025-01-26 ASSESSMENT — PAIN DESCRIPTION - DESCRIPTORS
DESCRIPTORS: ACHING
DESCRIPTORS: ACHING;THROBBING
DESCRIPTORS: ACHING;THROBBING

## 2025-01-26 ASSESSMENT — PAIN - FUNCTIONAL ASSESSMENT: PAIN_FUNCTIONAL_ASSESSMENT: ACTIVITIES ARE NOT PREVENTED

## 2025-01-26 NOTE — PROGRESS NOTES
Physical Therapy  [facility]   Physical Therapy Treatment  Date: 25  Patient Name: Criselda Tinoco       Room: -  MRN: [id number]  Account: [id number]   : 1950  (75 y.o.) Gender: female     Discharge Recommendations:  Discharge Recommendations: Patient would benefit from continued therapy after discharge, Therapy recommended at discharge     PT Equipment Recommendations  Equipment Needed: No    General  Patient assessed for rehabilitation services?: Yes  Family/Caregiver Present: Yes  Follows Commands: Within Functional Limits    Past Medical History:  has a past medical history of Allergic rhinitis, cause unspecified, Back pain, Bowel obstruction ([facility]), C. difficile diarrhea, CAD (coronary artery disease), Cardiac murmur, Cellulitis, Cerebral artery occlusion with cerebral infarction ([facility]), COVID-19, Diverticulosis of colon (without mention of hemorrhage), GERD (gastroesophageal reflux disease), History of blood transfusion, History of CHF (congestive heart failure), History of MI (myocardial infarction), History of ovarian cyst, History of peritonitis, HTN (hypertension), Hx of blood clots, Hyperlipidemia, Intestinal or peritoneal adhesions with obstruction (postoperative) (postinfection) (HCC), Kidney infection, Lateral epicondylitis  of elbow, MDRO (multiple drug resistant organisms) resistance, Muscle strain, Other abnormal glucose, PONV (postoperative nausea and vomiting), Pre-diabetes, Restless legs syndrome (RLS), Snores, 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  Past Surgical History:   has a past surgical history that includes Ovary removal (); colectomy (); Appendectomy (); Ovary removal (); Hysterectomy (); Bunionectomy (Left); sinus surgery (); Colonoscopy;

## 2025-01-26 NOTE — CARE COORDINATION
ONGOING DISCHARGE PLANNING NOTE:     Writer reviewed LSW notes, and discharge plan is ARU with insurance authorization started 1/24.     Will continue to follow for additional discharge needs.     Electronically signed by Lesly Polanco RN on 1/26/2025 at 11:26 AM

## 2025-01-26 NOTE — PROGRESS NOTES
[facility]   INPATIENT OCCUPATIONAL THERAPY  PROGRESS NOTE  Date: 2025  Patient Name: Criselda Tinoco       Room:   MRN: [id number]    : 1950  (75 y.o.)  Gender: female   Referring Practitioner: Jillian George MD  Diagnosis: UTI due to extended spectrum beta lactamase producing escherichia coli      Discharge Recommendations:  Further Occupational Therapy is recommended upon facility discharge.    OT Equipment Recommendations  Equipment Needed: Yes  Mobility Devices: Walker  Walker: Rolling  Other: TBD    Restrictions/Precautions  Restrictions/Precautions  Restrictions/Precautions: Fall Risk;General Precautions;Contact Precautions (ESBL contact)  Activity Level: Up as Tolerated;Up with Assist  Required Braces or Orthoses?: No  Implants Present? : Metal implants (Hardware in L wrist; neck hardware; lower back cage)    O2 Device: None (Room air)    Subjective  Subjective  Subjective: Pt is pleasant and cooperative  Pain  Pre-Pain: 5  Pain Location: Shoulder;Left       Objective  Orientation  Overall Orientation Status: Within Functional Limits  Orientation Level: Oriented X4  Cognition  Overall Cognitive Status: Exceptions  Arousal/Alertness: Appears intact  Following Commands: Follows one step commands with repetition;Follows one step commands with increased time  Attention Span: Attends with cues to redirect  Memory: Appears intact  Safety Judgement: Decreased awareness of need for assistance;Decreased awareness of need for safety  Problem Solving: Assistance required to generate solutions;Assistance required to implement solutions  Insights: Decreased awareness of deficits  Initiation: Requires cues for some  Sequencing: Requires cues for some    Activities of Daily Living  Toileting: Minimal assistance  Toileting Skilled Clinical Factors: Standing in natan stedy, min assist for brice care  Functional Mobility: Moderate assistance (x 2)    Balance  Balance  Sitting  strength  Short Term Goal 2: Pt will complete LB self-care tasks with Mod A, fair safety, and use of AE/DME/Modified techniques as needed  Short Term Goal 3: Pt will complete functional transfers/short mobility with Min A x2, fair safety, and use of least restrictive device  Short Term Goal 4: Pt will actively participate in 15+ minutes of therapeutic exercise/functional activity to improve strength and endurance needed to complete self-care  Short Term Goal 5: Pt will tolerate static/dynamic standing for 3+ minutes during self-care/functional activity with Min A x2 to improve balance and activity tolerance needed to complete self-care  Additional Goals?: Yes  Short Term Goal 6: Pt will verbalize/demonstrate good understanding of home safety/fall prevention strategies to increase safety and independence with self-care  Occupational Therapy Plan  Times Per Week: 5-7  Current Treatment Recommendations: Self-Care / ADL, Strengthening, ROM, Balance training, Functional mobility training, Endurance training, Cognitive reorientation, Pain management, Safety education & training, Patient/Caregiver education & training, Equipment evaluation, education, & procurement, Positioning, Home management training, Coordination training, Co-Treatment    Assessment  Activity Tolerance  Activity Tolerance: Patient Tolerated treatment well, Patient limited by fatigue  Assessment  Performance deficits / Impairments: Decreased functional mobility , Decreased ADL status, Decreased ROM, Decreased strength, Decreased safe awareness, Decreased endurance, Decreased balance, Decreased high-level IADLs, Decreased fine motor control, Decreased posture, Decreased coordination  Treatment Diagnosis: Impaired self-care status  Prognosis: Fair  Decision Making: Medium Complexity  Discharge Recommendations: Patient would benefit from continued therapy after discharge  OT Equipment Recommendations  Equipment Needed: Yes  Mobility Devices:

## 2025-01-26 NOTE — PROGRESS NOTES
[facility] Internal Medicine  Jm Epstein MD; MD Beau Louise MD; Sagrario Navarro MD; Jillian George MD    [facility] Internal Medicine   IN-PATIENT SERVICE   [facility]    Progress Note            Date:   1/26/2025  Patient name:  Criselda Tinoco  Date of admission:  1/21/2025  5:00 PM  MRN:   [id number]  Account:  [id number]  YOB: 1950  PCP:    [address] MD  Room:   2071/2071-01  Code Status:    Full Code    Chief Complaint:     Chief Complaint   Patient presents with    Dysuria        History Obtained From:     patient, electronic medical record    History of Present Illness:     Criselda Tinoco is a 75 y.o. Non- / non  female who presents with Dysuria   and is admitted to the hospital for the management of UTI due to extended-spectrum beta lactamase (ESBL) producing Escherichia coli.  Criselda Tinoco is a 75 y.o. Non- / non  female with a history of C. 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     According to patient and her family she has been feeling unwell for the past several weeks.  She had her urine tested at the [facility].  She complains of dysuria urgency, and and frequency.  [facility] and stated that her urine culture grew Proteus Mirabella with ESBL and E. coli.     The patient stated that she had surgery on her neck and has had difficulty with her activities of daily living since.  She states that she works faithfully with outpatient therapy.  She also endorses not taking her Lasix because she has difficulty making it to the restroom in time.  Upon assessment 1+ pitting edema was noted in her bilateral lower extremities.  Her abdomen was soft and diffusely

## 2025-01-26 NOTE — PROGRESS NOTES
[facility] -   Infectious diseases evaluation  admission date 1/21/2025    reason for consultation:   Uti,esbl    Impression :   Current:  Dysuria resolved/doubt UTI  Coronary artery disease  History of C. difficile colitis  History of CVA with left-sided weakness  CHF  Hypertension  Hyperlipidemia  History of ESBL  Allergy to Sulfa-Confusion    HENCE:   Monitor off antibiotics.  She received 6 doses of IV meropenem, discontinued 1/23/2025, started 1/21/2025   She received oral vancomycin for prophylaxis 1/22/2025 through 1/23/2025  No objection for discharge from infectious disease point of view, likely acute rehab.  Supportive care.  Discussed with patient.    Infection Control Recommendations   [place] Precautions  Contact Isolation       Antimicrobial Stewardship Recommendations   Simplification of therapy  Targeted therapy      History of Present Illness:   Initial history:  Criselda Tinoco is a 75 y.o.-year-old female presented to hospital with dysuria, frequency and urgency with urination and not feeling well for several days.  She also complaining of left shoulder pain that is chronic, denied fever or chills, denied nausea or vomiting, no diarrhea, no other complaints.  She does have chronic lower extremity edema.    Initial WBC 6.5, creatinine 0.8  History of C. difficile colitis    Interval changes  1/26/2025   Afebrile  VS stable, on RA  No acute changes, no complaints  Eating/drinking, denies n/v/d  1/22 Urine Cx - no growth      Patient Vitals for the past 8 hrs:   BP Temp Temp src Pulse Resp SpO2 Weight   01/26/25 0610 (!) 144/60 98.1 °F (36.7 °C) Oral 66 16 95 % --   01/26/25 0600 -- -- -- -- -- -- 85.2 kg (187 lb 13.3 oz)       I have personally reviewed the past medical history, past surgical history, medications, social history, and family history, and I haveupdated the database accordingly.      Allergies:   Fentanyl, Bactrim

## 2025-01-26 NOTE — PLAN OF CARE
Problem: Chronic Conditions and Co-morbidities  Goal: Patient's chronic conditions and co-morbidity symptoms are monitored and maintained or improved  Outcome: Progressing  Flowsheets (Taken 1/25/2025 2004)  Care Plan - Patient's Chronic Conditions and Co-Morbidity Symptoms are Monitored and Maintained or Improved: Monitor and assess patient's chronic conditions and comorbid symptoms for stability, deterioration, or improvement     Problem: Discharge Planning  Goal: Discharge to home or other facility with appropriate resources  Outcome: Progressing  Flowsheets (Taken 1/25/2025 2004)  Discharge to home or other facility with appropriate resources: Identify barriers to discharge with patient and caregiver     Problem: Skin/Tissue Integrity  Goal: Absence of new skin breakdown  Description: 1.  Monitor for areas of redness and/or skin breakdown  2.  Assess vascular access sites hourly  3.  Every 4-6 hours minimum:  Change oxygen saturation probe site  4.  Every 4-6 hours:  If on nasal continuous positive airway pressure, respiratory therapy assess nares and determine need for appliance change or resting period.  Outcome: Progressing     Problem: Safety - Adult  Goal: Free from fall injury  Outcome: Progressing     Problem: ABCDS Injury Assessment  Goal: Absence of physical injury  Outcome: Progressing     Problem: Neurosensory - Adult  Goal: Achieves maximal functionality and self care  Outcome: Progressing  Flowsheets (Taken 1/25/2025 2004)  Achieves maximal functionality and self care: Encourage and assist patient to increase activity and self care with guidance from physical therapy/occupational therapy     Problem: Respiratory - Adult  Goal: Achieves optimal ventilation and oxygenation  Outcome: Progressing  Flowsheets (Taken 1/25/2025 2004)  Achieves optimal ventilation and oxygenation: Assess for changes in respiratory status     Problem: Skin/Tissue Integrity - Adult  Goal: Incisions, wounds, or drain sites

## 2025-01-26 NOTE — PLAN OF CARE
Problem: Chronic Conditions and Co-morbidities  Goal: Patient's chronic conditions and co-morbidity symptoms are monitored and maintained or improved  Outcome: Progressing  Flowsheets (Taken 1/26/2025 0720)  Care Plan - Patient's Chronic Conditions and Co-Morbidity Symptoms are Monitored and Maintained or Improved:   Monitor and assess patient's chronic conditions and comorbid symptoms for stability, deterioration, or improvement   Collaborate with multidisciplinary team to address chronic and comorbid conditions and prevent exacerbation or deterioration   Update acute care plan with appropriate goals if chronic or comorbid symptoms are exacerbated and prevent overall improvement and discharge     Problem: Discharge Planning  Goal: Discharge to home or other facility with appropriate resources  Outcome: Progressing  Flowsheets (Taken 1/26/2025 0720)  Discharge to home or other facility with appropriate resources:   Identify barriers to discharge with patient and caregiver   Arrange for needed discharge resources and transportation as appropriate   Identify discharge learning needs (meds, wound care, etc)   Refer to discharge planning if patient needs post-hospital services based on physician order or complex needs related to functional status, cognitive ability or social support system     Problem: Skin/Tissue Integrity  Goal: Absence of new skin breakdown  Description: 1.  Monitor for areas of redness and/or skin breakdown  2.  Assess vascular access sites hourly  3.  Every 4-6 hours minimum:  Change oxygen saturation probe site  4.  Every 4-6 hours:  If on nasal continuous positive airway pressure, respiratory therapy assess nares and determine need for appliance change or resting period.  Outcome: Progressing     Problem: Safety - Adult  Goal: Free from fall injury  Outcome: Progressing     Problem: ABCDS Injury Assessment  Goal: Absence of physical injury  Outcome: Progressing     Problem: Neurosensory -

## 2025-01-27 LAB
BASOPHILS # BLD: 0.1 K/UL (ref 0–0.2)
BASOPHILS NFR BLD: 1 % (ref 0–2)
EOSINOPHIL # BLD: 0.6 K/UL (ref 0–0.4)
EOSINOPHILS RELATIVE PERCENT: 8 % (ref 0–4)
ERYTHROCYTE [DISTWIDTH] IN BLOOD BY AUTOMATED COUNT: 13.4 % (ref 11.5–14.9)
HCT VFR BLD AUTO: 38.1 % (ref 36–46)
HGB BLD-MCNC: 12.8 G/DL (ref 12–16)
LYMPHOCYTES NFR BLD: 2 K/UL (ref 1–4.8)
LYMPHOCYTES RELATIVE PERCENT: 24 % (ref 24–44)
MCH RBC QN AUTO: 32.3 PG (ref 26–34)
MCHC RBC AUTO-ENTMCNC: 33.5 G/DL (ref 31–37)
MCV RBC AUTO: 96.5 FL (ref 80–100)
MONOCYTES NFR BLD: 0.7 K/UL (ref 0.1–1.3)
MONOCYTES NFR BLD: 9 % (ref 1–7)
NEUTROPHILS NFR BLD: 58 % (ref 36–66)
NEUTS SEG NFR BLD: 4.7 K/UL (ref 1.3–9.1)
PLATELET # BLD AUTO: 219 K/UL (ref 150–450)
PMV BLD AUTO: 7.1 FL (ref 6–12)
RBC # BLD AUTO: 3.95 M/UL (ref 4–5.2)
WBC OTHER # BLD: 8.1 K/UL (ref 3.5–11)

## 2025-01-27 PROCEDURE — 97530 THERAPEUTIC ACTIVITIES: CPT

## 2025-01-27 PROCEDURE — 99231 SBSQ HOSP IP/OBS SF/LOW 25: CPT | Performed by: INTERNAL MEDICINE

## 2025-01-27 PROCEDURE — 36415 COLL VENOUS BLD VENIPUNCTURE: CPT

## 2025-01-27 PROCEDURE — G0545 PR INHERENT VISIT TO INPT: HCPCS | Performed by: INTERNAL MEDICINE

## 2025-01-27 PROCEDURE — 99232 SBSQ HOSP IP/OBS MODERATE 35: CPT | Performed by: INTERNAL MEDICINE

## 2025-01-27 PROCEDURE — 1200000000 HC SEMI PRIVATE

## 2025-01-27 PROCEDURE — 97116 GAIT TRAINING THERAPY: CPT

## 2025-01-27 PROCEDURE — 6370000000 HC RX 637 (ALT 250 FOR IP)

## 2025-01-27 PROCEDURE — 85025 COMPLETE CBC W/AUTO DIFF WBC: CPT

## 2025-01-27 RX ADMIN — ACETAMINOPHEN 650 MG: 325 TABLET ORAL at 08:34

## 2025-01-27 RX ADMIN — FUROSEMIDE 40 MG: 40 TABLET ORAL at 08:28

## 2025-01-27 RX ADMIN — HYDRALAZINE HYDROCHLORIDE 25 MG: 25 TABLET ORAL at 08:28

## 2025-01-27 RX ADMIN — MICONAZOLE NITRATE: 2 POWDER TOPICAL at 20:53

## 2025-01-27 RX ADMIN — BUSPIRONE HYDROCHLORIDE 5 MG: 5 TABLET ORAL at 08:28

## 2025-01-27 RX ADMIN — APIXABAN 5 MG: 5 TABLET, FILM COATED ORAL at 20:53

## 2025-01-27 RX ADMIN — BACLOFEN 10 MG: 10 TABLET ORAL at 08:28

## 2025-01-27 RX ADMIN — SERTRALINE HYDROCHLORIDE 25 MG: 50 TABLET ORAL at 08:29

## 2025-01-27 RX ADMIN — ASPIRIN 81 MG: 81 TABLET, COATED ORAL at 08:28

## 2025-01-27 RX ADMIN — HYDRALAZINE HYDROCHLORIDE 25 MG: 25 TABLET ORAL at 15:06

## 2025-01-27 RX ADMIN — POTASSIUM CHLORIDE 20 MEQ: 1500 TABLET, EXTENDED RELEASE ORAL at 08:28

## 2025-01-27 RX ADMIN — SERTRALINE HYDROCHLORIDE 25 MG: 50 TABLET ORAL at 20:53

## 2025-01-27 RX ADMIN — CARVEDILOL 25 MG: 25 TABLET, FILM COATED ORAL at 08:28

## 2025-01-27 RX ADMIN — BUSPIRONE HYDROCHLORIDE 5 MG: 5 TABLET ORAL at 20:53

## 2025-01-27 RX ADMIN — SERTRALINE HYDROCHLORIDE 25 MG: 50 TABLET ORAL at 15:05

## 2025-01-27 RX ADMIN — CALCIUM CARBONATE 600 MG (1,500 MG)-VITAMIN D3 400 UNIT TABLET 1 TABLET: at 08:28

## 2025-01-27 RX ADMIN — BUSPIRONE HYDROCHLORIDE 5 MG: 5 TABLET ORAL at 15:05

## 2025-01-27 RX ADMIN — BACLOFEN 10 MG: 10 TABLET ORAL at 20:53

## 2025-01-27 RX ADMIN — ATORVASTATIN CALCIUM 80 MG: 80 TABLET, FILM COATED ORAL at 08:28

## 2025-01-27 RX ADMIN — APIXABAN 5 MG: 5 TABLET, FILM COATED ORAL at 08:29

## 2025-01-27 RX ADMIN — CARVEDILOL 25 MG: 25 TABLET, FILM COATED ORAL at 16:57

## 2025-01-27 RX ADMIN — AMLODIPINE BESYLATE 10 MG: 10 TABLET ORAL at 08:28

## 2025-01-27 RX ADMIN — MICONAZOLE NITRATE: 2 POWDER TOPICAL at 08:30

## 2025-01-27 RX ADMIN — GABAPENTIN 200 MG: 100 CAPSULE ORAL at 08:28

## 2025-01-27 RX ADMIN — GABAPENTIN 200 MG: 100 CAPSULE ORAL at 20:53

## 2025-01-27 RX ADMIN — ACETAMINOPHEN 650 MG: 325 TABLET ORAL at 20:53

## 2025-01-27 RX ADMIN — HYDRALAZINE HYDROCHLORIDE 25 MG: 25 TABLET ORAL at 20:53

## 2025-01-27 ASSESSMENT — PAIN DESCRIPTION - LOCATION
LOCATION: HEAD
LOCATION: SHOULDER

## 2025-01-27 ASSESSMENT — PAIN DESCRIPTION - DESCRIPTORS
DESCRIPTORS: ACHING
DESCRIPTORS: ACHING

## 2025-01-27 ASSESSMENT — ENCOUNTER SYMPTOMS
COUGH: 0
WHEEZING: 0
VOMITING: 0
BACK PAIN: 0
NAUSEA: 0
ALLERGIC/IMMUNOLOGIC NEGATIVE: 1
ABDOMINAL PAIN: 0
SHORTNESS OF BREATH: 0
CONSTIPATION: 0
DIARRHEA: 0

## 2025-01-27 ASSESSMENT — PAIN DESCRIPTION - ORIENTATION
ORIENTATION: POSTERIOR
ORIENTATION: LEFT

## 2025-01-27 ASSESSMENT — PAIN SCALES - GENERAL
PAINLEVEL_OUTOF10: 4
PAINLEVEL_OUTOF10: 6

## 2025-01-27 NOTE — CARE COORDINATION
VMM received from [employer] at [employer], prescreen and updated clinicals to be faxed to [phone]. All requested information faxed to number  provided. Update provided to Becky CISNEROS via Perfect [employer].     354 pm   Received VMM from payor [employer]  representative Marita. Call Ref#na    Intent to deny admission to Acute Inpatient Rehabilitation Stay under Auth/CaseRef# [id number]      Rationale to deny Acute Inpatient Rehabilitation level of care: Does not meet ARU criteria     Peer to Peer Deadline: not provided     Peer to Peer Instructions: Call [phone] to schedule     Per PM[facility]R physiatrist Dr. Taiwo Edwards,  Message sent     Updated Farida GARNER:  Perfect serve   at this time.

## 2025-01-27 NOTE — CARE COORDINATION
THIS PRESCREEN WAS COMPLETED FOR INSURANCE PURPOSES ONLY, NOT FOR ADMISSION TO [facility].         ACUTE INPATIENT REHABILITATION  [facility]  PRE-ADMISSION ASSESSMENT    Patient Name: Criselda Tinoco        MRN: [id number]    : 1950  (75 y.o.)  Gender: female     Admitted from:  [facility]     Type of Admission:  New Admission     Date of Onset / Admission to the Acute Hospital:  2025    Inpatient Rehabilitation Admitting Diagnosis:  Debility secondary to UTI     Did patient have surgery/procedures?  No     Physicians:   Eufemia Nazario MD  Physician  Infectious Disease    Beau Anderson MD  Physician  Internal Medicine    Sagrario Navarro MD  Physician  Internal Medicine    Jillian George MD  Physician  Internal Medicine    Risk for Clinical Complications:  Low     Co-morbidities:    Debility  Possible UTI treated with antibiotics  History of CVA with ?left-sided weakness 5 to 6 years ago exam no significant weakness noted patient notes left side feels goofy at time-aspirin, Lipitor, baclofen, Neurontin  History of C. Difficile  Coronary artery disease/cardiac murmur/CHF/MI-aspirin, Lasix,   Questional A-fib-Eliquis  Hypertension/hyperlipidemia-Norvasc, Lipitor, Coreg, Lasix, hydralazine  Cervical fusion 2023 and in  at [facility] was in rehab after  Lumbar fusion   History of peritoneal adhesions with obstruction per past medical history  Depression-BuSpar/Zoloft    Financial Information  Primary insurance: Medicare HMO: [employer]       Secondary Insurance:  [facility]       Precautions:   []Cardiac Precautions: No Cardiac Precautions  []Total hip precautions: No Total Hip Precautions  []Weight Bearing status: No Weight Bearing Restrictions  [x]Other Precautions:  Metal implants (Hardware in L wrist; Neck hardware; lower back cage)   [x]Safety Precautions/Concerns:  Fall Risk, General Precautions  [x]Visually impaired: Yes: Wears glasses at all

## 2025-01-27 NOTE — PROGRESS NOTES
Patient declined     Attends Taoist Services: Patient declined     Active Member of Clubs or Organizations: Patient declined     Attends Club or Organization Meetings: Patient declined     Marital Status: Patient declined   Intimate Partner Violence: Unknown (2/22/2024)    Received from [facility]     Fear of Current or Ex-Partner: Not on file     Emotionally Abused: Not on file     Physically Abused: Not on file     Sexually Abused: Not on file     Physically or Sexually Abused: Not on file   Housing Stability: Low Risk  (1/21/2025)    Housing Stability Vital Sign     Unable to Pay for Housing in the Last Year: No     Number of Times Moved in the Last Year: 0     Homeless in the Last Year: No       Family History:     Family History   Problem Relation Age of Onset    Stroke Mother     Diabetes Mother     Heart Disease Mother     High Blood Pressure Mother     Heart Disease Father     Heart Disease Brother     High Blood Pressure Brother     Heart Disease Maternal Grandmother     High Blood Pressure Sister       Medical Decision Making:   I have independently reviewed/ordered the following labs:    CBC with Differential:   Recent Labs     01/27/25  0557   WBC 8.1   HGB 12.8   HCT 38.1      LYMPHOPCT 24   MONOPCT 9*   EOSPCT 8*     BMP:  No results for input(s): \"NA\", \"K\", \"CL\", \"CO2\", \"BUN\", \"CREATININE\", \"MG\" in the last 72 hours.    Invalid input(s): \"CA\"    Hepatic Function Panel: No results for input(s): \"LABALBU\", \"BILIDIR\", \"IBILI\", \"BILITOT\", \"ALKPHOS\", \"ALT\", \"AST\" in the last 72 hours.    Invalid input(s): \"PROT\"  No results for input(s): \"RPR\" in the last 72 hours.  No results for input(s): \"HIV\" in the last 72 hours.  No results for input(s): \"BC\" in the last 72 hours.  Lab Results   Component Value Date/Time    CREATININE 0.8 01/24/2025 06:29 AM    GLUCOSE 128 01/24/2025 06:29 AM       Detailed results:        Thank you for  allowing us to participate in the care of this patient.Please call with questions.    This note is created with the assistance of a speech recognition program.  While intending to generate adocument that actually reflects the content of the visit, the document can still have some errors including those of syntax and sound a like substitutions which may escape proof reading.  It such instances, actual meaningcan be extrapolated by contextual diversion.    Eufemia Nazario MD  Office: [phone]  Perfect serve / office [phone]

## 2025-01-27 NOTE — PLAN OF CARE
Problem: Chronic Conditions and Co-morbidities  Goal: Patient's chronic conditions and co-morbidity symptoms are monitored and maintained or improved  1/27/2025 1708 by Mitzy Alcaraz RN  Outcome: Progressing  1/27/2025 0628 by Bruce Lyle RN  Outcome: Progressing  Flowsheets (Taken 1/26/2025 2209)  Care Plan - Patient's Chronic Conditions and Co-Morbidity Symptoms are Monitored and Maintained or Improved: Monitor and assess patient's chronic conditions and comorbid symptoms for stability, deterioration, or improvement     Problem: Discharge Planning  Goal: Discharge to home or other facility with appropriate resources  1/27/2025 1708 by Mitzy Alcaraz RN  Outcome: Progressing  1/27/2025 0628 by Bruce Lyle RN  Outcome: Progressing  Flowsheets (Taken 1/26/2025 2209)  Discharge to home or other facility with appropriate resources: Identify barriers to discharge with patient and caregiver     Problem: Skin/Tissue Integrity  Goal: Absence of new skin breakdown  Description: 1.  Monitor for areas of redness and/or skin breakdown  2.  Assess vascular access sites hourly  3.  Every 4-6 hours minimum:  Change oxygen saturation probe site  4.  Every 4-6 hours:  If on nasal continuous positive airway pressure, respiratory therapy assess nares and determine need for appliance change or resting period.  1/27/2025 1708 by Mitzy Alcaraz RN  Outcome: Progressing  1/27/2025 0628 by Bruce Lyle RN  Outcome: Progressing     Problem: Safety - Adult  Goal: Free from fall injury  1/27/2025 1708 by Mitzy Alcaraz RN  Outcome: Progressing  1/27/2025 0628 by Bruce Lyle RN  Outcome: Progressing     Problem: ABCDS Injury Assessment  Goal: Absence of physical injury  1/27/2025 1708 by Mitzy Alcaraz RN  Outcome: Progressing  1/27/2025 0628 by Bruce Lyle RN  Outcome: Progressing     Problem: Neurosensory - Adult  Goal: Achieves maximal functionality and self care  1/27/2025 1708 by Mitzy Alcaraz RN  Outcome:  Progressing  1/27/2025 0628 by Bruce Lyle RN  Outcome: Progressing  Flowsheets (Taken 1/26/2025 2209)  Achieves maximal functionality and self care: Encourage and assist patient to increase activity and self care with guidance from physical therapy/occupational therapy     Problem: Respiratory - Adult  Goal: Achieves optimal ventilation and oxygenation  1/27/2025 1708 by Mitzy Alcaraz RN  Outcome: Progressing  1/27/2025 0628 by Bruce Lyle RN  Outcome: Progressing  Flowsheets (Taken 1/26/2025 2209)  Achieves optimal ventilation and oxygenation: Assess for changes in respiratory status     Problem: Skin/Tissue Integrity - Adult  Goal: Incisions, wounds, or drain sites healing without S/S of infection  1/27/2025 1708 by Mitzy Alcaraz RN  Outcome: Progressing  1/27/2025 0628 by Bruce Lyle RN  Outcome: Progressing  Flowsheets (Taken 1/26/2025 2209)  Incisions, Wounds, or Drain Sites Healing Without Sign and Symptoms of Infection: TWICE DAILY: Assess and document dressing/incision, wound bed, drain sites and surrounding tissue     Problem: Genitourinary - Adult  Goal: Absence of urinary retention  1/27/2025 1708 by Mitzy Alcaraz RN  Outcome: Progressing  1/27/2025 0628 by Bruce Lyle RN  Outcome: Progressing  Flowsheets (Taken 1/26/2025 2209)  Absence of urinary retention: Assess patient’s ability to void and empty bladder     Problem: Infection - Adult  Goal: Absence of infection at discharge  1/27/2025 1708 by Mitzy Alcaraz RN  Outcome: Progressing  1/27/2025 0628 by Bruce Lyle RN  Outcome: Progressing  Flowsheets (Taken 1/26/2025 2209)  Absence of infection at discharge: Assess and monitor for signs and symptoms of infection     Problem: Pain  Goal: Verbalizes/displays adequate comfort level or baseline comfort level  1/27/2025 1708 by Mitzy Alcaraz RN  Outcome: Progressing  1/27/2025 0628 by Bruce Lyle RN  Outcome: Progressing

## 2025-01-27 NOTE — CARE COORDINATION
DISCHARGE PLANNING NOTE:    Writer is following for potential discharge to [facility].Call placed to Cecelia with ARU, authorization is pending at this time, updated clinicals requested.    Writer met with pt for update on pending authorization. All questions answered at this time.  Electronically signed by HIPOLITO Hanson on 1/27/2025 at 12:13 PM    Perfect[employer] message received from Cecelia that peer to peer is being offered, currently waiting to confirm if physician will complete.  Electronically signed by HIPOLITO Hanson on 1/27/2025 at 5:14 PM

## 2025-01-27 NOTE — PROGRESS NOTES
Occupational Therapy  [facility]   INPATIENT OCCUPATIONAL THERAPY  PROGRESS NOTE  Date: 2025  Patient Name: Criselda Tinoco       Room:   MRN: [id number]    : 1950  (75 y.o.)  Gender: female   Referring Practitioner: Jillian George MD  Diagnosis: UTI due to extended spectrum beta lactamase producing escherichia coli      Discharge Recommendations:  The patient would benefit from an intensive level of therapy after discharge from the facility. They should be able to tolerate 3-hours of Combined OT/PT/ST over 5 days/week or at least 900 minutes of  Combined Therapy over 7 days.    OT Equipment Recommendations  Equipment Needed: Yes  Mobility Devices: Walker  Walker: Rolling  Other: TBD    Restrictions/Precautions  Restrictions/Precautions  Restrictions/Precautions: Fall Risk;General Precautions;Contact Precautions  Activity Level: Up as Tolerated;Up with Assist  Required Braces or Orthoses?: No  Implants Present? : Metal implants    O2 Device: None (Room air)    Subjective  Subjective  Subjective: Pt is pleasant and cooperative  Pain  Pre-Pain: 8  Post-Pain: 8  Pain Location: Shoulder;Left  Pain Descriptor: Aching  Pain Interventions: Rest  Comments: RN jeff tx, co-tx c Vesna ROLLINS PTA    Objective  Orientation  Overall Orientation Status: Within Functional Limits  Orientation Level: Oriented X4  Cognition  Overall Cognitive Status: Exceptions  Arousal/Alertness: Appears intact  Following Commands: Follows one step commands with repetition;Follows one step commands with increased time  Attention Span: Attends with cues to redirect  Memory: Appears intact  Safety Judgement: Decreased awareness of need for assistance;Decreased awareness of need for safety  Problem Solving: Assistance required to generate solutions;Assistance required to implement solutions  Insights: Decreased awareness of deficits  Initiation: Requires cues for some  Sequencing: Requires cues for  fatigue  Assessment  Performance deficits / Impairments: Decreased functional mobility , Decreased ADL status, Decreased ROM, Decreased strength, Decreased safe awareness, Decreased endurance, Decreased balance, Decreased high-level IADLs, Decreased fine motor control, Decreased posture, Decreased coordination  Treatment Diagnosis: Impaired self-care status  Prognosis: Fair  Decision Making: Medium Complexity  Discharge Recommendations: Patient would benefit from continued therapy after discharge  OT Equipment Recommendations  Equipment Needed: Yes  Mobility Devices: Walker  Walker: Rolling  Other: TBD  Safety Devices  Type of Devices: All fall risk precautions in place, Call light within reach, Gait belt, Patient at risk for falls, Left in chair, Nurse notified, Chair alarm in place    AM-PAC Daily Activities Inpatient  AM-PAC Daily Activity - Inpatient   How much help is needed for putting on and taking off regular lower body clothing?: Total  How much help is needed for bathing (which includes washing, rinsing, drying)?: A Lot  How much help is needed for toileting (which includes using toilet, bedpan, or urinal)?: Total  How much help is needed for putting on and taking off regular upper body clothing?: A Lot  How much help is needed for taking care of personal grooming?: A Little  How much help for eating meals?: A Little  AM-PAC Inpatient Daily Activity Raw Score: 12  AM-PAC Inpatient ADL T-Scale Score : 30.6  ADL Inpatient CMS 0-100% Score: 66.57  ADL Inpatient CMS G-Code Modifier : CL    OT Minutes  OT Individual Minutes  Time In: 1129  Time Out: 1159  Minutes: 30      Electronically signed by PATRICIA Concepcion on 1/27/25 at 1:11 PM EST

## 2025-01-27 NOTE — PROGRESS NOTES
[facility] Internal Medicine  Jm Epstein MD; MD Beau Louise MD; Sagrario Navarro MD; Jillian George MD    [facility] Internal Medicine   IN-PATIENT SERVICE   [facility]    Progress Note            Date:   1/27/2025  Patient name:  Criselda Tinoco  Date of admission:  1/21/2025  5:00 PM  MRN:   [id number]  Account:  [id number]  YOB: 1950  PCP:    [address] MD  Room:   2071/2071-01  Code Status:    Full Code    Chief Complaint:     Chief Complaint   Patient presents with    Dysuria        History Obtained From:     patient, electronic medical record    History of Present Illness:     Criselda Tinoco is a 75 y.o. Non- / non  female who presents with Dysuria   and is admitted to the hospital for the management of UTI due to extended-spectrum beta lactamase (ESBL) producing Escherichia coli.  Criselda Tinoco is a 75 y.o. Non- / non  female with a history of C. 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     According to patient and her family she has been feeling unwell for the past several weeks.  She had her urine tested at the [facility].  She complains of dysuria urgency, and and frequency.  [facility] and stated that her urine culture grew Proteus Mirabella with ESBL and E. coli.     The patient stated that she had surgery on her neck and has had difficulty with her activities of daily living since.  She states that she works faithfully with outpatient therapy.  She also endorses not taking her Lasix because she has difficulty making it to the restroom in time.  Upon assessment 1+ pitting edema was noted in her bilateral lower extremities.  Her abdomen was soft and diffusely

## 2025-01-27 NOTE — PROGRESS NOTES
[facility]   Physical Therapy Treatment  Date: 25  Patient Name: Criselda Tinoco       Room:   MRN: [id number]  Account: [id number]   : 1950  (75 y.o.) Gender: female     Discharge Recommendations:  Patient would benefit from continued therapy after discharge, Therapy recommended at discharge     PT Equipment Recommendations  Equipment Needed: No    General  Patient assessed for rehabilitation services?: Yes  Family/Caregiver Present: Yes  Follows Commands: Within Functional Limits    Past Medical History:  has a past medical history of Allergic rhinitis, cause unspecified, Back pain, Bowel obstruction ([facility]), C. difficile diarrhea, CAD (coronary artery disease), Cardiac murmur, Cellulitis, Cerebral artery occlusion with cerebral infarction ([facility]), COVID-19, Diverticulosis of colon (without mention of hemorrhage), GERD (gastroesophageal reflux disease), History of blood transfusion, History of CHF (congestive heart failure), History of MI (myocardial infarction), History of ovarian cyst, History of peritonitis, HTN (hypertension), Hx of blood clots, Hyperlipidemia, Intestinal or peritoneal adhesions with obstruction (postoperative) (postinfection) (HCC), Kidney infection, Lateral epicondylitis  of elbow, MDRO (multiple drug resistant organisms) resistance, Muscle strain, Other abnormal glucose, PONV (postoperative nausea and vomiting), Pre-diabetes, Restless legs syndrome (RLS), Snores, 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  Past Surgical History:   has a past surgical history that includes Ovary removal (); colectomy (); Appendectomy (); Ovary removal (); Hysterectomy (); Bunionectomy (Left); sinus surgery (); Colonoscopy; other surgical history (2014); cyst

## 2025-01-27 NOTE — PLAN OF CARE
Problem: Chronic Conditions and Co-morbidities  Goal: Patient's chronic conditions and co-morbidity symptoms are monitored and maintained or improved  1/27/2025 0628 by Bruce Lyle RN  Outcome: Progressing  Flowsheets (Taken 1/26/2025 2209)  Care Plan - Patient's Chronic Conditions and Co-Morbidity Symptoms are Monitored and Maintained or Improved: Monitor and assess patient's chronic conditions and comorbid symptoms for stability, deterioration, or improvement     Problem: Discharge Planning  Goal: Discharge to home or other facility with appropriate resources  1/27/2025 0628 by Bruce Lyle RN  Outcome: Progressing  Flowsheets (Taken 1/26/2025 2209)  Discharge to home or other facility with appropriate resources: Identify barriers to discharge with patient and caregiver     Problem: Skin/Tissue Integrity  Goal: Absence of new skin breakdown  Description: 1.  Monitor for areas of redness and/or skin breakdown  2.  Assess vascular access sites hourly  3.  Every 4-6 hours minimum:  Change oxygen saturation probe site  4.  Every 4-6 hours:  If on nasal continuous positive airway pressure, respiratory therapy assess nares and determine need for appliance change or resting period.  1/27/2025 0628 by Bruce Lyle RN  Outcome: Progressing     Problem: Safety - Adult  Goal: Free from fall injury  1/27/2025 0628 by Bruce Lyle RN  Outcome: Progressing     Problem: ABCDS Injury Assessment  Goal: Absence of physical injury  1/27/2025 0628 by Bruce Lyle RN  Outcome: Progressing     Problem: Neurosensory - Adult  Goal: Achieves maximal functionality and self care  1/27/2025 0628 by Bruce Lyle RN  Outcome: Progressing  Flowsheets (Taken 1/26/2025 2209)  Achieves maximal functionality and self care: Encourage and assist patient to increase activity and self care with guidance from physical therapy/occupational therapy     Problem: Respiratory - Adult  Goal: Achieves optimal ventilation and

## 2025-01-27 NOTE — PROGRESS NOTES
Writer contacted Ayah Talbert NP as pts IV is 5 days old. Per policy IV are to removed and replaced every 4 days. Pt is not currently receiving any IV medication and just waiting on ARU authorization for discharge. Order received that pt is allowed to remain without IV.

## 2025-01-28 PROCEDURE — 99232 SBSQ HOSP IP/OBS MODERATE 35: CPT | Performed by: INTERNAL MEDICINE

## 2025-01-28 PROCEDURE — 97530 THERAPEUTIC ACTIVITIES: CPT

## 2025-01-28 PROCEDURE — G0545 PR INHERENT VISIT TO INPT: HCPCS | Performed by: INTERNAL MEDICINE

## 2025-01-28 PROCEDURE — 99231 SBSQ HOSP IP/OBS SF/LOW 25: CPT | Performed by: INTERNAL MEDICINE

## 2025-01-28 PROCEDURE — 97535 SELF CARE MNGMENT TRAINING: CPT

## 2025-01-28 PROCEDURE — 6370000000 HC RX 637 (ALT 250 FOR IP)

## 2025-01-28 PROCEDURE — 1200000000 HC SEMI PRIVATE

## 2025-01-28 PROCEDURE — 97110 THERAPEUTIC EXERCISES: CPT

## 2025-01-28 RX ADMIN — ACETAMINOPHEN 650 MG: 325 TABLET ORAL at 22:00

## 2025-01-28 RX ADMIN — CALCIUM CARBONATE 600 MG (1,500 MG)-VITAMIN D3 400 UNIT TABLET 1 TABLET: at 08:26

## 2025-01-28 RX ADMIN — Medication 6 MG: at 22:02

## 2025-01-28 RX ADMIN — GABAPENTIN 200 MG: 100 CAPSULE ORAL at 08:26

## 2025-01-28 RX ADMIN — BUSPIRONE HYDROCHLORIDE 5 MG: 5 TABLET ORAL at 08:26

## 2025-01-28 RX ADMIN — POTASSIUM CHLORIDE 20 MEQ: 1500 TABLET, EXTENDED RELEASE ORAL at 08:26

## 2025-01-28 RX ADMIN — HYDRALAZINE HYDROCHLORIDE 25 MG: 25 TABLET ORAL at 14:40

## 2025-01-28 RX ADMIN — SERTRALINE HYDROCHLORIDE 25 MG: 50 TABLET ORAL at 08:26

## 2025-01-28 RX ADMIN — CARVEDILOL 25 MG: 25 TABLET, FILM COATED ORAL at 08:26

## 2025-01-28 RX ADMIN — CARVEDILOL 25 MG: 25 TABLET, FILM COATED ORAL at 17:25

## 2025-01-28 RX ADMIN — APIXABAN 5 MG: 5 TABLET, FILM COATED ORAL at 21:59

## 2025-01-28 RX ADMIN — BUSPIRONE HYDROCHLORIDE 5 MG: 5 TABLET ORAL at 21:59

## 2025-01-28 RX ADMIN — MICONAZOLE NITRATE: 2 POWDER TOPICAL at 08:29

## 2025-01-28 RX ADMIN — BACLOFEN 10 MG: 10 TABLET ORAL at 22:00

## 2025-01-28 RX ADMIN — ATORVASTATIN CALCIUM 80 MG: 80 TABLET, FILM COATED ORAL at 08:26

## 2025-01-28 RX ADMIN — MICONAZOLE NITRATE: 2 POWDER TOPICAL at 22:00

## 2025-01-28 RX ADMIN — AMLODIPINE BESYLATE 10 MG: 10 TABLET ORAL at 08:27

## 2025-01-28 RX ADMIN — ASPIRIN 81 MG: 81 TABLET, COATED ORAL at 08:26

## 2025-01-28 RX ADMIN — HYDRALAZINE HYDROCHLORIDE 25 MG: 25 TABLET ORAL at 08:26

## 2025-01-28 RX ADMIN — SERTRALINE HYDROCHLORIDE 25 MG: 50 TABLET ORAL at 22:00

## 2025-01-28 RX ADMIN — BACLOFEN 10 MG: 10 TABLET ORAL at 08:26

## 2025-01-28 RX ADMIN — BUSPIRONE HYDROCHLORIDE 5 MG: 5 TABLET ORAL at 14:40

## 2025-01-28 RX ADMIN — ACETAMINOPHEN 650 MG: 325 TABLET ORAL at 12:20

## 2025-01-28 RX ADMIN — HYDRALAZINE HYDROCHLORIDE 25 MG: 25 TABLET ORAL at 21:59

## 2025-01-28 RX ADMIN — FUROSEMIDE 40 MG: 40 TABLET ORAL at 08:26

## 2025-01-28 RX ADMIN — GABAPENTIN 200 MG: 100 CAPSULE ORAL at 22:00

## 2025-01-28 RX ADMIN — SERTRALINE HYDROCHLORIDE 25 MG: 50 TABLET ORAL at 14:40

## 2025-01-28 RX ADMIN — APIXABAN 5 MG: 5 TABLET, FILM COATED ORAL at 08:26

## 2025-01-28 ASSESSMENT — PAIN SCALES - GENERAL: PAINLEVEL_OUTOF10: 5

## 2025-01-28 ASSESSMENT — ENCOUNTER SYMPTOMS
DIARRHEA: 0
SHORTNESS OF BREATH: 0
ABDOMINAL PAIN: 0
VOMITING: 0
COUGH: 0
BACK PAIN: 0
ALLERGIC/IMMUNOLOGIC NEGATIVE: 1
NAUSEA: 0
WHEEZING: 0
CONSTIPATION: 0

## 2025-01-28 NOTE — PROGRESS NOTES
Nutrition Assessment     Type and Reason for Visit: [facility]    Nutrition Recommendations/Plan:   Will continue to provide Regular     Malnutrition Assessment:  Malnutrition Status:  No Malnutrition    Nutrition Assessment:  Pt admitted due to UTI. She is on a regular diet and consuming more than 75% of food provided consistently.  Review of wt history shows pt is withing her usual body wt range.    Nutrition Related Findings:     Wound Type: None    Current Nutrition Therapies:    ADULT DIET; Regular    Anthropometric Measures:  Height: 160 cm (5' 3\")  Current Body Wt: 84.4 kg (186 lb)   BMI: 33        Nutrition Diagnosis:   No nutrition diagnosis at this time     Nutrition Interventions:   Food and/or Nutrient Delivery: Continue Current Diet                      Nutrition Monitoring and Evaluation:      Food/Nutrient Intake Outcomes: Food and Nutrient Intake       Discharge Planning:    Continue current diet     Mariana Plascencia RD, LD  Contact: [phone]

## 2025-01-28 NOTE — PLAN OF CARE
Problem: Chronic Conditions and Co-morbidities  Goal: Patient's chronic conditions and co-morbidity symptoms are monitored and maintained or improved  1/28/2025 0254 by Bruce Lyle RN  Outcome: Progressing  Flowsheets (Taken 1/27/2025 2056)  Care Plan - Patient's Chronic Conditions and Co-Morbidity Symptoms are Monitored and Maintained or Improved: Monitor and assess patient's chronic conditions and comorbid symptoms for stability, deterioration, or improvement     Problem: Discharge Planning  Goal: Discharge to home or other facility with appropriate resources  1/28/2025 0254 by Bruce Lyle RN  Outcome: Progressing  Flowsheets (Taken 1/27/2025 2056)  Discharge to home or other facility with appropriate resources: Identify barriers to discharge with patient and caregiver     Problem: Skin/Tissue Integrity  Goal: Absence of new skin breakdown  Description: 1.  Monitor for areas of redness and/or skin breakdown  2.  Assess vascular access sites hourly  3.  Every 4-6 hours minimum:  Change oxygen saturation probe site  4.  Every 4-6 hours:  If on nasal continuous positive airway pressure, respiratory therapy assess nares and determine need for appliance change or resting period.  1/28/2025 0254 by Bruce Lyle RN  Outcome: Progressing     Problem: Safety - Adult  Goal: Free from fall injury  1/28/2025 0254 by Bruce Lyle RN  Outcome: Progressing     Problem: ABCDS Injury Assessment  Goal: Absence of physical injury  1/28/2025 0254 by Bruce Lyle RN  Outcome: Progressing     Problem: Neurosensory - Adult  Goal: Achieves maximal functionality and self care  1/28/2025 0254 by Bruce Lyle RN  Outcome: Progressing  Flowsheets (Taken 1/27/2025 2056)  Achieves maximal functionality and self care: Encourage and assist patient to increase activity and self care with guidance from physical therapy/occupational therapy     Problem: Respiratory - Adult  Goal: Achieves optimal ventilation and

## 2025-01-28 NOTE — PROGRESS NOTES
Physical Therapy  [facility]   Physical Therapy Treatment  Date: 25  Patient Name: Criselda Tinoco       Room: -  MRN: [id number]  Account: [id number]   : 1950  (75 y.o.) Gender: female     Discharge Recommendations:  Discharge Recommendations: Patient would benefit from continued therapy after discharge, Therapy recommended at discharge     PT Equipment Recommendations  Equipment Needed: No    General  Patient assessed for rehabilitation services?: Yes  Family/Caregiver Present: Yes  Follows Commands: Within Functional Limits    Past Medical History:  has a past medical history of Allergic rhinitis, cause unspecified, Back pain, Bowel obstruction ([facility]), C. difficile diarrhea, CAD (coronary artery disease), Cardiac murmur, Cellulitis, Cerebral artery occlusion with cerebral infarction ([facility]), COVID-19, Diverticulosis of colon (without mention of hemorrhage), GERD (gastroesophageal reflux disease), History of blood transfusion, History of CHF (congestive heart failure), History of MI (myocardial infarction), History of ovarian cyst, History of peritonitis, HTN (hypertension), Hx of blood clots, Hyperlipidemia, Intestinal or peritoneal adhesions with obstruction (postoperative) (postinfection) (HCC), Kidney infection, Lateral epicondylitis  of elbow, MDRO (multiple drug resistant organisms) resistance, Muscle strain, Other abnormal glucose, PONV (postoperative nausea and vomiting), Pre-diabetes, Restless legs syndrome (RLS), Snores, 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  Past Surgical History:   has a past surgical history that includes Ovary removal (); colectomy (); Appendectomy (); Ovary removal (); Hysterectomy (); Bunionectomy (Left); sinus surgery (); Colonoscopy;  to Chair: Maximum assistance;2 Person Assistance (~ 3 steps + pivot)  Stand Pivot Transfers: Maximum Assistance;2 Person Assistance (Cues for sequencing while compelting)     Mobility      Ambulation  Surface: Level tile  Device: Rolling Walker  Assistance: Maximum assistance, 2 Person assistance  Quality of Gait: Slow, patient hyper extends L LE, pushes back, segmented movements  Gait Deviations: Decreased step height, Decreased step length, Shuffles  Distance: 3-4 steps from bed to recliner  Comments: increased assist and cueing as pt begins to fatigue          Stairs  Stairs/Curb  Stairs?: No    Bed Mobility  Bed mobility  Supine to Sit: Moderate assistance, 2 Person assistance  Scooting: Dependent/Total, 2 Person assistance  Bed Mobility Comments: HOB elevated and use of bed rails, slow paced. Cues for sequencing     Balance  Balance  Sitting - Static: Fair (SBA EOB)  Sitting - Dynamic: Fair, - (SBA EOB)  Standing - Static: Poor (MaxAx2)  Standing - Dynamic: Poor (MaxAx2)     PT Exercises  A/AROM Exercises: seated bilat LE marches, LAQ x10 reps  Resistive Exercises: seated bilat hamstring curls and hip abd x10 reps with orange tband  Functional Mobility Circuit Training: STS with RW x 2, modAx2  Static Sitting Balance Exercises: Pt dangling EOB x 5' prior to attempting transfers  Dynamic Standing Balance Exercises: Standing at RW x 1' static standing and 3' while transfering to recliner     Assessment  Assessment  Decision Making: Medium Complexity  History: CVA  Exam: decreased balance, strength, endurance, mobility  Discharge Recommendations: Patient would benefit from continued therapy after discharge, Therapy recommended at discharge  Activity Tolerance  Activity Tolerance: Patient tolerated treatment well     Patient Education  Patient Education  Education Given To: Patient  Education Provided: Role of Therapy, Home Exercise Program, Transfer Training, Fall Prevention Strategies, Energy

## 2025-01-28 NOTE — PROGRESS NOTES
Cranial Nerves: Cranial nerves 2-12 are intact.      Motor: Motor function is intact. No weakness.   Psychiatric:         Attention and Perception: Attention normal.         Mood and Affect: Mood normal.         Speech: Speech normal.         Behavior: Behavior is cooperative.         Investigations:      Laboratory Testing:  No results found for this or any previous visit (from the past 24 hour(s)).        Imaging/Diagnostics:  No results found.    Assessment :      Hospital Problems             Last Modified POA    * (Principal) UTI due to extended-spectrum beta lactamase (ESBL) producing Escherichia coli 1/22/2025 Yes    Dysuria 1/25/2025 Yes    History of infection due to extended spectrum beta lactamase (ESBL) producing bacteria 1/25/2025 Yes       Plan:     Patient status inpatient in the Med/Surge    75-year-old old female presenting with urinary frequency for the past 2 months.  Noted to have UTI.  Admitted for the management of UTI.  UTI.  Urinary frequency for the past 2 months, urine culture was checked by the nurse visiting at home, they mentioned that it is growing Proteus ESBL and E. coli, need to be treated with IV antibiotics so was sent to the ER.  No history of previous UTIs.  Continue IV Merrem.  ID consult.  Check postvoid residual urine.  Check urine culture.  History of CVA.  Continue aspirin.  History of DVT/PE. Continue Eliquis.  Chronic HFpEF.  Stable.  Continue Lasix.  Hypertension.  Continue home medication.  Currently on the higher side.  No chest pain, blurry vision, headache or palpitations.  Continue to monitor blood pressure closely.  PT OT.  DVT prophylaxis.  Patient already on Eliquis for DVT/PE.    1/23  Patient seen and examined at bedside.  Afebrile, vital signs are stable.  Urine culture shows no growth.  DC Merrem.  She is feeling much better, no acute issues.  Patient wants to go to the facility.   on board, facility placement.    1/28  75-year-old female with  urinary frequency and admitted with complicated UTI, was growing Proteus ESBL and E. coli treated with IV meropenem, infectious disease on board, now monitor off antibiotics  history of CVA in the past, on aspirin and Eliquis and statins,  DVT PE on Eliquis,  Chronic diastolic heart failure continue with the home dose of Lasix,  Hypertension, blood pressure controlled,  PM&R seen the patient, has been accepted  Discharge pending placement    Consultations:   IP CONSULT TO INTERNAL MEDICINE  IP CONSULT TO SPIRITUAL SERVICES  IP CONSULT TO INFECTIOUS DISEASES  IP CONSULT TO PHYSICAL MEDICINE REHAB      Beau Anderson MD  1/28/2025  10:55 AM      Please note that this chart was generated using voice recognition Dragon dictation software.  Although every effort was made to ensure the accuracy of this automated transcription, some errors in transcription may have occurred.     Consent: Written consent was obtained and risks were reviewed including but not limited to scarring, infection, bleeding, scabbing, incomplete removal, nerve damage and allergy to anesthesia.

## 2025-01-28 NOTE — PROGRESS NOTES
[facility]   INPATIENT OCCUPATIONAL THERAPY  PROGRESS NOTE  Date: 2025  Patient Name: Criselda Tinoco       Room:   MRN: [id number]    : 1950  (75 y.o.)  Gender: female   Referring Practitioner: Jillian George MD  Diagnosis: UTI due to extended spectrum beta lactamase producing escherichia coli      Discharge Recommendations:  Further Occupational Therapy is recommended upon facility discharge.    OT Equipment Recommendations  Equipment Needed: Yes  Mobility Devices: Walker  Walker: Rolling  Other: TBD    Restrictions/Precautions  Restrictions/Precautions  Restrictions/Precautions: Fall Risk;General Precautions;Contact Precautions  Activity Level: Up as Tolerated;Up with Assist  Required Braces or Orthoses?: No  Implants Present? : Metal implants    O2 Device: None (Room air)    Subjective  Subjective  Subjective: Pt. is pleasant and motivated for therapy this date.  Pain  Pre-Pain: 0 (reports having L shoulder pain of 8 w/ movement then rates pain 0)  Pain Location: Left;Shoulder  Comments: RN oks tx    Objective  Orientation  Overall Orientation Status: Within Functional Limits  Orientation Level: Oriented X4  Cognition  Overall Cognitive Status: Exceptions  Arousal/Alertness: Appears intact  Following Commands: Follows one step commands with repetition;Follows one step commands with increased time  Attention Span: Attends with cues to redirect  Memory: Appears intact  Safety Judgement: Decreased awareness of need for assistance;Decreased awareness of need for safety  Problem Solving: Assistance required to generate solutions;Assistance required to implement solutions  Insights: Decreased awareness of deficits  Initiation: Requires cues for some  Sequencing: Requires cues for some    Activities of Daily Living  Grooming: Minimal assistance  Grooming Skilled Clinical Factors: Completed sitting down in recliner. Set up Req. Pt was Modified independent for oral

## 2025-01-28 NOTE — CARE COORDINATION
DISCHARGE PLANNING NOTE:    Writer is following for potential discharge to [facility]. Perfect[employer] message placed to Cecelia with ARU to check the status of peer to peer conversation. Writer met with pt for update, pt unsure of discharge plan at this time, agreeable to writer calling spouse Will to discuss.    Call placed to Will. No answer, voicemail left for return call.  Electronically signed by HIPOLITO Hanson on 1/28/2025 at 1:14 PM    Writer placed call back to [facility] and [facility] to discuss discharge plans with writer.  Electronically signed by HIPOLITO Hanson on 1/28/2025 at 3:54 PM    Writer met with pt and Micah to discuss discharge plans. Micah states pt has been to [facility] at [facility] multiple times and does not understand her not being approved. Writer explained Per Dr. Edwards pt is at functional baseline. [employer] message placed to Cecelia to inquire about appeal options.    Micah has voicemail from Dianne with [facility] to discuss placement options, pt has been there prior. Writer refaxed to referral and placed call to [facility]. No update, voicemail left for return call.   Electronically signed by HIPOLITO Hanson on 1/28/2025 at 4:33 PM    FOC list provided to pt and spouse for review to send referrals to new facilities. Writer informed pt she was accepted at [facility] and [facility] during prior admission. Pt declines and wishes to send referrals elsewhere  Electronically signed by HIPOLITO Hanson on 1/28/2025 at 4:48 PM    Writer obtained facility choices, multiple referrals sent for acceptance.  Electronically signed by HIPOLITO Hanson on 1/28/2025 at 5:18 PM

## 2025-01-28 NOTE — PLAN OF CARE
Problem: Chronic Conditions and Co-morbidities  Goal: Patient's chronic conditions and co-morbidity symptoms are monitored and maintained or improved  1/28/2025 1629 by Mitzy Alcaraz RN  Outcome: Progressing  1/28/2025 0254 by Bruce Lyle RN  Outcome: Progressing  Flowsheets (Taken 1/27/2025 2056)  Care Plan - Patient's Chronic Conditions and Co-Morbidity Symptoms are Monitored and Maintained or Improved: Monitor and assess patient's chronic conditions and comorbid symptoms for stability, deterioration, or improvement     Problem: Discharge Planning  Goal: Discharge to home or other facility with appropriate resources  1/28/2025 1629 by Mitzy Alcaraz RN  Outcome: Progressing  1/28/2025 0254 by Bruce Lyle RN  Outcome: Progressing  Flowsheets (Taken 1/27/2025 2056)  Discharge to home or other facility with appropriate resources: Identify barriers to discharge with patient and caregiver     Problem: Skin/Tissue Integrity  Goal: Absence of new skin breakdown  Description: 1.  Monitor for areas of redness and/or skin breakdown  2.  Assess vascular access sites hourly  3.  Every 4-6 hours minimum:  Change oxygen saturation probe site  4.  Every 4-6 hours:  If on nasal continuous positive airway pressure, respiratory therapy assess nares and determine need for appliance change or resting period.  1/28/2025 1629 by Mitzy Alcaraz RN  Outcome: Progressing  1/28/2025 0254 by Bruce Lyle RN  Outcome: Progressing     Problem: Safety - Adult  Goal: Free from fall injury  1/28/2025 1629 by Mitzy Alcaraz, RN  Outcome: Progressing  1/28/2025 0254 by Bruce Lyle RN  Outcome: Progressing     Problem: ABCDS Injury Assessment  Goal: Absence of physical injury  1/28/2025 1629 by Mitzy Alcaraz RN  Outcome: Progressing  1/28/2025 0254 by Bruce Lyle RN  Outcome: Progressing     Problem: Neurosensory - Adult  Goal: Achieves maximal functionality and self care  1/28/2025 1629 by Mitzy Alcaraz RN  Outcome:

## 2025-01-28 NOTE — PROGRESS NOTES
chloride flush  5-40 mL IntraVENous 2 times per day       Social History:     Social History     Socioeconomic History    Marital status:      Spouse name: Not on file    Number of children: Not on file    Years of education: Not on file    Highest education level: Not on file   Occupational History    Occupation: retired   Tobacco Use    Smoking status: Former     Current packs/day: 0.00     Average packs/day: 0.5 packs/day for 21.9 years (10.9 ttl pk-yrs)     Types: Cigarettes     Start date: 1995     Quit date: 2017     Years since quittin.6    Smokeless tobacco: Never   Vaping Use    Vaping status: Never Used   Substance and Sexual Activity    Alcohol use: No     Alcohol/week: 0.0 standard drinks of alcohol    Drug use: No    Sexual activity: Not on file   Other Topics Concern    Not on file   Social History Narrative    Not on file     Social Determinants of Health     Financial Resource Strain: Patient Declined (2024)    Overall Financial Resource Strain (CARDIA)     Difficulty of Paying Living Expenses: Patient declined   Food Insecurity: No Food Insecurity (2025)    Hunger Vital Sign     Worried About Running Out of Food in the Last Year: Never true     Ran Out of Food in the Last Year: Never true   Transportation Needs: No Transportation Needs (2025)    PRAPARE - Transportation     Lack of Transportation (Medical): No     Lack of Transportation (Non-Medical): No   Physical Activity: Inactive (2022)    Exercise Vital Sign     Days of Exercise per Week: 0 days     Minutes of Exercise per Session: 0 min   Stress: Not on file   Social Connections: Unknown (2020)    Social Connection and Isolation Panel [NHANES]     Frequency of Communication with Friends and Family: Patient declined     Frequency of Social Gatherings with Friends and Family: Patient declined     Attends L[employer] Services: Patient declined     Active Member of Clubs or Organizations: Patient declined  speech recognition program.  While intending to generate adocument that actually reflects the content of the visit, the document can still have some errors including those of syntax and sound a like substitutions which may escape proof reading.  It such instances, actual meaningcan be extrapolated by contextual diversion.    Eufemia Nazario MD  Office: [phone]  Perfect serve / office [phone]

## 2025-01-29 PROCEDURE — 97110 THERAPEUTIC EXERCISES: CPT

## 2025-01-29 PROCEDURE — 99232 SBSQ HOSP IP/OBS MODERATE 35: CPT | Performed by: INTERNAL MEDICINE

## 2025-01-29 PROCEDURE — 99231 SBSQ HOSP IP/OBS SF/LOW 25: CPT | Performed by: INTERNAL MEDICINE

## 2025-01-29 PROCEDURE — 6370000000 HC RX 637 (ALT 250 FOR IP): Performed by: NURSE PRACTITIONER

## 2025-01-29 PROCEDURE — 97116 GAIT TRAINING THERAPY: CPT

## 2025-01-29 PROCEDURE — 1200000000 HC SEMI PRIVATE

## 2025-01-29 PROCEDURE — G0545 PR INHERENT VISIT TO INPT: HCPCS | Performed by: INTERNAL MEDICINE

## 2025-01-29 PROCEDURE — 97530 THERAPEUTIC ACTIVITIES: CPT

## 2025-01-29 PROCEDURE — 6370000000 HC RX 637 (ALT 250 FOR IP)

## 2025-01-29 RX ORDER — BENZONATATE 100 MG/1
100 CAPSULE ORAL 3 TIMES DAILY PRN
Status: DISCONTINUED | OUTPATIENT
Start: 2025-01-29 | End: 2025-01-30 | Stop reason: HOSPADM

## 2025-01-29 RX ADMIN — MICONAZOLE NITRATE: 2 POWDER TOPICAL at 20:30

## 2025-01-29 RX ADMIN — BUSPIRONE HYDROCHLORIDE 5 MG: 5 TABLET ORAL at 08:52

## 2025-01-29 RX ADMIN — BENZONATATE 100 MG: 100 CAPSULE ORAL at 23:33

## 2025-01-29 RX ADMIN — SERTRALINE HYDROCHLORIDE 25 MG: 50 TABLET ORAL at 08:51

## 2025-01-29 RX ADMIN — ASPIRIN 81 MG: 81 TABLET, COATED ORAL at 08:51

## 2025-01-29 RX ADMIN — CARVEDILOL 25 MG: 25 TABLET, FILM COATED ORAL at 08:51

## 2025-01-29 RX ADMIN — MICONAZOLE NITRATE: 2 POWDER TOPICAL at 08:53

## 2025-01-29 RX ADMIN — SERTRALINE HYDROCHLORIDE 25 MG: 50 TABLET ORAL at 17:30

## 2025-01-29 RX ADMIN — CALCIUM CARBONATE 600 MG (1,500 MG)-VITAMIN D3 400 UNIT TABLET 1 TABLET: at 08:51

## 2025-01-29 RX ADMIN — BUSPIRONE HYDROCHLORIDE 5 MG: 5 TABLET ORAL at 20:29

## 2025-01-29 RX ADMIN — AMLODIPINE BESYLATE 10 MG: 10 TABLET ORAL at 08:51

## 2025-01-29 RX ADMIN — HYDRALAZINE HYDROCHLORIDE 25 MG: 25 TABLET ORAL at 08:51

## 2025-01-29 RX ADMIN — SERTRALINE HYDROCHLORIDE 25 MG: 50 TABLET ORAL at 20:29

## 2025-01-29 RX ADMIN — BUSPIRONE HYDROCHLORIDE 5 MG: 5 TABLET ORAL at 13:02

## 2025-01-29 RX ADMIN — CARVEDILOL 25 MG: 25 TABLET, FILM COATED ORAL at 17:30

## 2025-01-29 RX ADMIN — POTASSIUM CHLORIDE 20 MEQ: 1500 TABLET, EXTENDED RELEASE ORAL at 08:51

## 2025-01-29 RX ADMIN — ACETAMINOPHEN 650 MG: 325 TABLET ORAL at 20:27

## 2025-01-29 RX ADMIN — APIXABAN 5 MG: 5 TABLET, FILM COATED ORAL at 20:29

## 2025-01-29 RX ADMIN — FUROSEMIDE 40 MG: 40 TABLET ORAL at 08:51

## 2025-01-29 RX ADMIN — GABAPENTIN 200 MG: 100 CAPSULE ORAL at 08:52

## 2025-01-29 RX ADMIN — BACLOFEN 10 MG: 10 TABLET ORAL at 08:52

## 2025-01-29 RX ADMIN — Medication 6 MG: at 20:26

## 2025-01-29 RX ADMIN — HYDRALAZINE HYDROCHLORIDE 25 MG: 25 TABLET ORAL at 13:01

## 2025-01-29 RX ADMIN — ATORVASTATIN CALCIUM 80 MG: 80 TABLET, FILM COATED ORAL at 08:51

## 2025-01-29 RX ADMIN — BACLOFEN 10 MG: 10 TABLET ORAL at 20:29

## 2025-01-29 RX ADMIN — HYDRALAZINE HYDROCHLORIDE 25 MG: 25 TABLET ORAL at 20:26

## 2025-01-29 RX ADMIN — GABAPENTIN 200 MG: 100 CAPSULE ORAL at 20:26

## 2025-01-29 RX ADMIN — APIXABAN 5 MG: 5 TABLET, FILM COATED ORAL at 08:52

## 2025-01-29 RX ADMIN — ACETAMINOPHEN 650 MG: 325 TABLET ORAL at 13:01

## 2025-01-29 ASSESSMENT — PAIN DESCRIPTION - PAIN TYPE: TYPE: ACUTE PAIN

## 2025-01-29 ASSESSMENT — ENCOUNTER SYMPTOMS
COUGH: 0
ABDOMINAL PAIN: 0
BACK PAIN: 0
ALLERGIC/IMMUNOLOGIC NEGATIVE: 1
NAUSEA: 0
VOMITING: 0
CONSTIPATION: 0
DIARRHEA: 0
SHORTNESS OF BREATH: 0
WHEEZING: 0

## 2025-01-29 ASSESSMENT — PAIN SCALES - GENERAL
PAINLEVEL_OUTOF10: 7
PAINLEVEL_OUTOF10: 7
PAINLEVEL_OUTOF10: 0

## 2025-01-29 ASSESSMENT — PAIN DESCRIPTION - DIRECTION: RADIATING_TOWARDS: NECK

## 2025-01-29 ASSESSMENT — PAIN DESCRIPTION - LOCATION
LOCATION: HEAD
LOCATION: HEAD

## 2025-01-29 ASSESSMENT — PAIN DESCRIPTION - ONSET: ONSET: ON-GOING

## 2025-01-29 ASSESSMENT — PAIN DESCRIPTION - FREQUENCY: FREQUENCY: CONTINUOUS

## 2025-01-29 ASSESSMENT — PAIN DESCRIPTION - DESCRIPTORS: DESCRIPTORS: ACHING

## 2025-01-29 ASSESSMENT — PAIN DESCRIPTION - ORIENTATION
ORIENTATION: POSTERIOR
ORIENTATION: POSTERIOR

## 2025-01-29 ASSESSMENT — PAIN - FUNCTIONAL ASSESSMENT: PAIN_FUNCTIONAL_ASSESSMENT: ACTIVITIES ARE NOT PREVENTED

## 2025-01-29 NOTE — PLAN OF CARE
Problem: Chronic Conditions and Co-morbidities  Goal: Patient's chronic conditions and co-morbidity symptoms are monitored and maintained or improved  Outcome: Progressing     Problem: Discharge Planning  Goal: Discharge to home or other facility with appropriate resources  Outcome: Progressing     Problem: Skin/Tissue Integrity  Goal: Absence of new skin breakdown  Description: 1.  Monitor for areas of redness and/or skin breakdown  2.  Assess vascular access sites hourly  3.  Every 4-6 hours minimum:  Change oxygen saturation probe site  4.  Every 4-6 hours:  If on nasal continuous positive airway pressure, respiratory therapy assess nares and determine need for appliance change or resting period.  Outcome: Progressing     Problem: Safety - Adult  Goal: Free from fall injury  Outcome: Progressing     Problem: ABCDS Injury Assessment  Goal: Absence of physical injury  Outcome: Progressing     Problem: Neurosensory - Adult  Goal: Achieves maximal functionality and self care  Outcome: Progressing     Problem: Respiratory - Adult  Goal: Achieves optimal ventilation and oxygenation  Outcome: Progressing     Problem: Skin/Tissue Integrity - Adult  Goal: Incisions, wounds, or drain sites healing without S/S of infection  Outcome: Progressing     Problem: Genitourinary - Adult  Goal: Absence of urinary retention  Outcome: Progressing     Problem: Infection - Adult  Goal: Absence of infection at discharge  Outcome: Progressing     Problem: Pain  Goal: Verbalizes/displays adequate comfort level or baseline comfort level  Outcome: Progressing

## 2025-01-29 NOTE — PROGRESS NOTES
[facility]  Jm Epstein MD; MD Beau Louise MD; Sagrario Navarro MD; Jillian George MD    [facility]   IN-PATIENT SERVICE   [facility]    Progress Note            Date:   1/29/2025  Patient name:  Criselda Tinoco  Date of admission:  1/21/2025  5:00 PM  MRN:   [id number]  Account:  [id number]  YOB: 1950  PCP:    [address] MD  Room:   2071/2071-01  Code Status:    Full Code    Chief Complaint:     Chief Complaint   Patient presents with    Dysuria        History Obtained From:     patient, electronic medical record    History of Present Illness:     Criselda Tinoco is a 75 y.o. Non- / non  female who presents with Dysuria   and is admitted to the hospital for the management of UTI due to extended-spectrum beta lactamase (ESBL) producing Escherichia coli.  Criselda Tinoco is a 75 y.o. Non- / non  female with a history of C. 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     According to patient and her family she has been feeling unwell for the past several weeks.  She had her urine tested at the [facility].  She complains of dysuria urgency, and and frequency.  [facility] and stated that her urine culture grew Proteus Mirabella with ESBL and E. coli.     The patient stated that she had surgery on her neck and has had difficulty with her activities of daily living since.  She states that she works faithfully with outpatient therapy.  She also endorses not taking her Lasix because she has difficulty making it to the restroom in time.  Upon assessment 1+ pitting edema was noted in her bilateral lower extremities.  Her abdomen was soft and diffusely  fatigue and fever.   Respiratory:  Negative for cough, shortness of breath and wheezing.    Cardiovascular:  Negative for chest pain, palpitations and leg swelling.   Gastrointestinal:  Negative for abdominal pain, constipation, diarrhea, nausea and vomiting.   Endocrine: Negative.    Genitourinary:  Negative for difficulty urinating and dysuria.   Musculoskeletal:  Negative for back pain.   Allergic/Immunologic: Negative.    Neurological:  Negative for dizziness, weakness and headaches.   Hematological: Negative.    Psychiatric/Behavioral:  Negative for agitation and confusion.        Physical Exam:   BP (!) 118/50   Pulse 64   Temp 98.2 °F (36.8 °C) (Oral)   Resp 16   Ht 1.6 m (5' 3\")   Wt 84.5 kg (186 lb 4.6 oz)   SpO2 97%   BMI 33.00 kg/m²   Temp (24hrs), Av °F (36.7 °C), Min:97.7 °F (36.5 °C), Max:98.2 °F (36.8 °C)    No results for input(s): \"POCGLU\" in the last 72 hours.  No intake or output data in the 24 hours ending 25 1046          Physical Exam  Vitals reviewed.   Constitutional:       General: She is awake. She is not in acute distress.     Appearance: Normal appearance. She is obese. She is not ill-appearing.   HENT:      Head: Normocephalic.      Mouth/Throat:      Mouth: Mucous membranes are moist.   Eyes:      General: Lids are normal.      Extraocular Movements: Extraocular movements intact.   Cardiovascular:      Rate and Rhythm: Normal rate.      Pulses: Normal pulses.      Heart sounds: Normal heart sounds.   Pulmonary:      Effort: No accessory muscle usage.      Breath sounds: Normal breath sounds and air entry.   Abdominal:      General: Abdomen is flat. Bowel sounds are normal.      Palpations: Abdomen is soft.      Tenderness: There is no abdominal tenderness.   Musculoskeletal:      Right lower leg: No swelling.      Left lower leg: No swelling.   Skin:     General: Skin is warm.   Neurological:      Mental Status: She is alert and oriented to person, place, and time.

## 2025-01-29 NOTE — CARE COORDINATION
DISCHARGE PLANNING NOTE:    LSW following for potential SNF placement. Writer met with patient to discuss accepting facilities. Patient is agreeable to [facility]. Message to Chantell in admissions to submit insurance authorization.

## 2025-01-29 NOTE — PROGRESS NOTES
Occupational Therapy  [facility]   INPATIENT OCCUPATIONAL THERAPY  PROGRESS NOTE  Date: 2025  Patient Name: Criselda Tinoco       Room:   MRN: [id number]    : 1950  (75 y.o.)  Gender: female   Referring Practitioner: Jillian George MD  Diagnosis: UTI due to extended spectrum beta lactamase producing escherichia coli      Discharge Recommendations:  Further Occupational Therapy is recommended upon facility discharge.    OT Equipment Recommendations  Equipment Needed: Yes  Mobility Devices: Walker  Walker: Rolling  Other: TBD    Restrictions/Precautions  Restrictions/Precautions  Restrictions/Precautions: Fall Risk;General Precautions;Contact Precautions  Activity Level: Up as Tolerated;Up with Assist  Required Braces or Orthoses?: No    O2 Device: None (Room air)    Subjective  Subjective  Subjective: Pt. is pleasant and motivated for therapy this date.  Pain  Pre-Pain: 7  Post-Pain: 7  Pain Location: Left;Shoulder  Pain Descriptor: Aching  Pain Interventions: Rest  Comments: RN jeff tx, co-tx c Vesna ROLLINS PTA    Objective  Orientation  Overall Orientation Status: Within Functional Limits  Orientation Level: Oriented X4  Cognition  Overall Cognitive Status: Exceptions  Arousal/Alertness: Appears intact  Following Commands: Follows one step commands with repetition;Follows one step commands with increased time  Attention Span: Attends with cues to redirect  Memory: Appears intact  Safety Judgement: Decreased awareness of need for assistance;Decreased awareness of need for safety  Problem Solving: Assistance required to generate solutions;Assistance required to implement solutions  Insights: Decreased awareness of deficits  Initiation: Requires cues for some  Sequencing: Requires cues for some    Activities of Daily Living  Putting On/Taking Off Footwear: Dependent/Total  Putting On/Taking Off Footwear Skilled Clinical Factors: TA to don footies  Additional Comments: Pt

## 2025-01-29 NOTE — PLAN OF CARE
Problem: Chronic Conditions and Co-morbidities  Goal: Patient's chronic conditions and co-morbidity symptoms are monitored and maintained or improved  1/29/2025 0219 by Bruce Lyle RN  Outcome: Progressing  Flowsheets (Taken 1/28/2025 2200)  Care Plan - Patient's Chronic Conditions and Co-Morbidity Symptoms are Monitored and Maintained or Improved: Monitor and assess patient's chronic conditions and comorbid symptoms for stability, deterioration, or improvement     Problem: Discharge Planning  Goal: Discharge to home or other facility with appropriate resources  1/29/2025 0219 by Bruce Lyle RN  Outcome: Progressing  Flowsheets (Taken 1/28/2025 2200)  Discharge to home or other facility with appropriate resources: Identify barriers to discharge with patient and caregiver     Problem: Skin/Tissue Integrity  Goal: Absence of new skin breakdown  Description: 1.  Monitor for areas of redness and/or skin breakdown  2.  Assess vascular access sites hourly  3.  Every 4-6 hours minimum:  Change oxygen saturation probe site  4.  Every 4-6 hours:  If on nasal continuous positive airway pressure, respiratory therapy assess nares and determine need for appliance change or resting period.  1/29/2025 0219 by Bruce Lyle RN  Outcome: Progressing     Problem: Safety - Adult  Goal: Free from fall injury  1/29/2025 0219 by Bruce Lyle RN  Outcome: Progressing  1/28/2025 1629 by Mitzy Alcaraz RN  Outcome: Progressing     Problem: ABCDS Injury Assessment  Goal: Absence of physical injury  1/29/2025 0219 by Bruce Lyle RN  Outcome: Progressing     Problem: Neurosensory - Adult  Goal: Achieves maximal functionality and self care  1/29/2025 0219 by Bruce Lyle RN  Outcome: Progressing  Flowsheets (Taken 1/28/2025 2200)  Achieves maximal functionality and self care: Encourage and assist patient to increase activity and self care with guidance from physical therapy/occupational therapy     Problem:

## 2025-01-29 NOTE — PROGRESS NOTES
needed moving to and from a bed to a chair?: Total  How much help is needed standing up from a chair using your arms?: Total  How much help is needed walking in hospital room?: Total  How much help is needed climbing 3-5 steps with a railing?: Total  AM-PAC Inpatient Mobility Raw Score : 10  AM-PAC Inpatient T-Scale Score : 32.29  Mobility Inpatient CMS 0-100% Score: 76.75  Mobility Inpatient CMS G-Code Modifier : CL     Goals     Short Term Goals  Time Frame for Short Term Goals: 2-3 days  Short Term Goal 1: pt able to perform bed mobility with min/modA  Short Term Goal 2: pt able to stand with RW and Jennifer from various surfaces  Short Term Goal 3: pt able to transfer to/from bed/chair with RW and modA of 1  Short Term Goal 4: Improve sitting/standing balance to Fair to improve safety of transfrs/ADLs      Plan  Physical Therapy Plan  General Plan: 5-7 times per week  Current Treatment Recommendations: Strengthening, Balance training, Functional mobility training, Transfer training, Endurance training, Safety education & training, Therapeutic activities   Safety Devices  Type of Devices: Call light within reach, Bed alarm in place, Left in bed     Co-treatment with OT warranted secondary to decreased safety and independence requiring 2 skilled therapy professionals to address individual discipline's goals. PT addressing  gait, pre gait trunk strengthening, weight shifting prior to transfers, and transfer training.     PT Individual Minutes  Time In: 1350  Time Out: 1420  Minutes: 30           Electronically signed by Vesna Osborne PTA on 1/29/25 at 2:41 PM EST

## 2025-01-29 NOTE — PROGRESS NOTES
[facility] -   Infectious diseases evaluation  admission date 1/21/2025    reason for consultation:   Uti,esbl    Impression :   Current:  Dysuria resolved/doubt UTI  Coronary artery disease  History of C. difficile colitis  History of CVA with left-sided weakness  CHF  Hypertension  Hyperlipidemia  History of ESBL  Allergy to Sulfa-Confusion    HENCE:   Off antibiotics  She received 6 doses of IV meropenem, discontinued 1/23/2025, started 1/21/2025   She received oral vancomycin for prophylaxis 1/22/2025 through 1/23/2025  No objection for discharge from infectious disease point of view  Supportive care.  Discussed with patient.    Infection Control Recommendations   [place] Precautions  Contact Isolation       Antimicrobial Stewardship Recommendations   Simplification of therapy  Targeted therapy      History of Present Illness:   Initial history:  Criselda Tinoco is a 75 y.o.-year-old female presented to hospital with dysuria, frequency and urgency with urination and not feeling well for several days.  She does have chronic lower extremity edema.    Initial WBC 6.5, creatinine 0.8  History of C. difficile colitis    Interval changes  1/29/2025   She was seen and examined earlier today, afebrile, no new events  1/22 Urine Cx - no growth      Patient Vitals for the past 8 hrs:   BP Pulse   01/29/25 1301 (!) 118/55 65       I have personally reviewed the past medical history, past surgical history, medications, social history, and family history, and I haveupdated the database accordingly.      Allergies:   Fentanyl, Bactrim [sulfamethoxazole-trimethoprim], Cat dander, Codeine, Diazepam, Meperidine hcl, and Seasonal     Review of Systems:     Review of Systems   All other systems reviewed and are negative.      Physical Examination :       Physical Exam  Vitals and nursing note reviewed.   Constitutional:       General: She is not in acute distress.     Appearance:

## 2025-01-30 VITALS
HEIGHT: 63 IN | WEIGHT: 185.85 LBS | BODY MASS INDEX: 32.93 KG/M2 | RESPIRATION RATE: 16 BRPM | OXYGEN SATURATION: 97 % | SYSTOLIC BLOOD PRESSURE: 127 MMHG | DIASTOLIC BLOOD PRESSURE: 52 MMHG | TEMPERATURE: 97.9 F | HEART RATE: 59 BPM

## 2025-01-30 PROCEDURE — 97535 SELF CARE MNGMENT TRAINING: CPT

## 2025-01-30 PROCEDURE — 97530 THERAPEUTIC ACTIVITIES: CPT

## 2025-01-30 PROCEDURE — 6370000000 HC RX 637 (ALT 250 FOR IP)

## 2025-01-30 PROCEDURE — 99239 HOSP IP/OBS DSCHRG MGMT >30: CPT | Performed by: INTERNAL MEDICINE

## 2025-01-30 PROCEDURE — 6370000000 HC RX 637 (ALT 250 FOR IP): Performed by: NURSE PRACTITIONER

## 2025-01-30 PROCEDURE — 97110 THERAPEUTIC EXERCISES: CPT

## 2025-01-30 PROCEDURE — 99231 SBSQ HOSP IP/OBS SF/LOW 25: CPT | Performed by: INTERNAL MEDICINE

## 2025-01-30 PROCEDURE — G0545 PR INHERENT VISIT TO INPT: HCPCS | Performed by: INTERNAL MEDICINE

## 2025-01-30 RX ORDER — BENZONATATE 100 MG/1
100 CAPSULE ORAL 3 TIMES DAILY PRN
Qty: 60 CAPSULE | Refills: 0 | Status: SHIPPED | OUTPATIENT
Start: 2025-01-30 | End: 2025-03-01

## 2025-01-30 RX ORDER — GABAPENTIN 100 MG/1
200 CAPSULE ORAL 2 TIMES DAILY
Qty: 10 CAPSULE | Refills: 0 | Status: SHIPPED | OUTPATIENT
Start: 2025-01-30 | End: 2025-02-04

## 2025-01-30 RX ADMIN — ATORVASTATIN CALCIUM 80 MG: 80 TABLET, FILM COATED ORAL at 08:29

## 2025-01-30 RX ADMIN — FUROSEMIDE 40 MG: 40 TABLET ORAL at 08:28

## 2025-01-30 RX ADMIN — HYDRALAZINE HYDROCHLORIDE 25 MG: 25 TABLET ORAL at 15:06

## 2025-01-30 RX ADMIN — BUSPIRONE HYDROCHLORIDE 5 MG: 5 TABLET ORAL at 15:06

## 2025-01-30 RX ADMIN — GABAPENTIN 200 MG: 100 CAPSULE ORAL at 08:29

## 2025-01-30 RX ADMIN — BUSPIRONE HYDROCHLORIDE 5 MG: 5 TABLET ORAL at 20:12

## 2025-01-30 RX ADMIN — ACETAMINOPHEN 650 MG: 325 TABLET ORAL at 08:29

## 2025-01-30 RX ADMIN — CARVEDILOL 25 MG: 25 TABLET, FILM COATED ORAL at 08:29

## 2025-01-30 RX ADMIN — BENZONATATE 100 MG: 100 CAPSULE ORAL at 20:17

## 2025-01-30 RX ADMIN — HYDRALAZINE HYDROCHLORIDE 25 MG: 25 TABLET ORAL at 20:11

## 2025-01-30 RX ADMIN — GABAPENTIN 200 MG: 100 CAPSULE ORAL at 20:12

## 2025-01-30 RX ADMIN — APIXABAN 5 MG: 5 TABLET, FILM COATED ORAL at 20:12

## 2025-01-30 RX ADMIN — CARVEDILOL 25 MG: 25 TABLET, FILM COATED ORAL at 17:16

## 2025-01-30 RX ADMIN — ASPIRIN 81 MG: 81 TABLET, COATED ORAL at 08:29

## 2025-01-30 RX ADMIN — POTASSIUM CHLORIDE 20 MEQ: 1500 TABLET, EXTENDED RELEASE ORAL at 08:29

## 2025-01-30 RX ADMIN — HYDRALAZINE HYDROCHLORIDE 25 MG: 25 TABLET ORAL at 08:29

## 2025-01-30 RX ADMIN — BUSPIRONE HYDROCHLORIDE 5 MG: 5 TABLET ORAL at 08:28

## 2025-01-30 RX ADMIN — SERTRALINE HYDROCHLORIDE 25 MG: 50 TABLET ORAL at 20:12

## 2025-01-30 RX ADMIN — ACETAMINOPHEN 650 MG: 325 TABLET ORAL at 15:06

## 2025-01-30 RX ADMIN — BACLOFEN 10 MG: 10 TABLET ORAL at 20:12

## 2025-01-30 RX ADMIN — SERTRALINE HYDROCHLORIDE 25 MG: 50 TABLET ORAL at 15:05

## 2025-01-30 RX ADMIN — SERTRALINE HYDROCHLORIDE 25 MG: 50 TABLET ORAL at 08:28

## 2025-01-30 RX ADMIN — CALCIUM CARBONATE 600 MG (1,500 MG)-VITAMIN D3 400 UNIT TABLET 1 TABLET: at 08:29

## 2025-01-30 RX ADMIN — APIXABAN 5 MG: 5 TABLET, FILM COATED ORAL at 08:28

## 2025-01-30 RX ADMIN — BACLOFEN 10 MG: 10 TABLET ORAL at 08:29

## 2025-01-30 RX ADMIN — AMLODIPINE BESYLATE 10 MG: 10 TABLET ORAL at 08:28

## 2025-01-30 ASSESSMENT — PAIN SCALES - GENERAL
PAINLEVEL_OUTOF10: 7
PAINLEVEL_OUTOF10: 0
PAINLEVEL_OUTOF10: 7

## 2025-01-30 ASSESSMENT — PAIN DESCRIPTION - LOCATION
LOCATION: NECK;HEAD
LOCATION: HEAD

## 2025-01-30 ASSESSMENT — PAIN DESCRIPTION - ORIENTATION: ORIENTATION: POSTERIOR

## 2025-01-30 ASSESSMENT — PAIN DESCRIPTION - DESCRIPTORS: DESCRIPTORS: ACHING

## 2025-01-30 NOTE — CARE COORDINATION
DISCHARGE PLANNING NOTE:    Writer is following for potential discharge to [facility]. Message received from Chantell with [facility], authorization is pending at this time. Writer met with pt for update on pending insurance authorization, pt requests call placed to spouse True for update.     Call placed to True for update. No further questions at this time.  Electronically signed by HIPOLITO Hanson on 1/30/2025 at 10:26 AM    Message received from Chantell with [facility], facility has received authorization for this pt. Approved through 2/3. Call placed to bedside MEENU Cronin regarding discharge, no notation by physician. Perfectserve message placed to Dr. Anderson inquiring if pt is able to discharge.  Electronically signed by HIPOLITO Hanson on 1/30/2025 at 4:34 PM

## 2025-01-30 NOTE — CARE COORDINATION
Hens complete.  Document ID : [id number]  Electronically signed by HIPOLITO Hanson on 1/30/2025 at 5:22 PM

## 2025-01-30 NOTE — PLAN OF CARE
Problem: Chronic Conditions and Co-morbidities  Goal: Patient's chronic conditions and co-morbidity symptoms are monitored and maintained or improved  1/30/2025 1821 by Roseanne Colón RN  Outcome: Adequate for Discharge  1/30/2025 1821 by Roseanne Colón RN  Outcome: Adequate for Discharge  Flowsheets (Taken 1/30/2025 1035)  Care Plan - Patient's Chronic Conditions and Co-Morbidity Symptoms are Monitored and Maintained or Improved: Monitor and assess patient's chronic conditions and comorbid symptoms for stability, deterioration, or improvement  1/30/2025 0605 by Schober, Robyn L, RN  Outcome: Progressing  Flowsheets (Taken 1/29/2025 2009)  Care Plan - Patient's Chronic Conditions and Co-Morbidity Symptoms are Monitored and Maintained or Improved:   Monitor and assess patient's chronic conditions and comorbid symptoms for stability, deterioration, or improvement   Collaborate with multidisciplinary team to address chronic and comorbid conditions and prevent exacerbation or deterioration     Problem: Discharge Planning  Goal: Discharge to home or other facility with appropriate resources  1/30/2025 1821 by Roseanne Colón RN  Outcome: Adequate for Discharge  1/30/2025 1821 by Roseanne Colón RN  Outcome: Adequate for Discharge  Flowsheets (Taken 1/30/2025 1035)  Discharge to home or other facility with appropriate resources: Identify barriers to discharge with patient and caregiver  1/30/2025 0605 by Schober, Robyn L, RN  Outcome: Progressing  Flowsheets (Taken 1/29/2025 2009)  Discharge to home or other facility with appropriate resources: Identify barriers to discharge with patient and caregiver     Problem: Skin/Tissue Integrity  Goal: Absence of new skin breakdown  Description: 1.  Monitor for areas of redness and/or skin breakdown  2.  Assess vascular access sites hourly  3.  Every 4-6 hours minimum:  Change oxygen saturation probe site  4.  Every 4-6 hours:  If on nasal continuous positive airway pressure,

## 2025-01-30 NOTE — CARE COORDINATION
IMM letter provided to patient spouse Will.  Patient representative offered four hours to make informed decision regarding appeal process; patient representative agreeable to discharge.   Electronically signed by HIPOLITO Hanson on 1/30/2025 at 5:10 PM

## 2025-01-30 NOTE — PROGRESS NOTES
Physical Therapy    [facility]   Physical Therapy Treatment  Date: 25  Patient Name: Criselda Tinoco       Room:   MRN: [id number]  Account: [id number]   : 1950  (75 y.o.) Gender: female     Discharge Recommendations:  Discharge Recommendations: Patient would benefit from continued therapy after discharge, Therapy recommended at discharge     PT Equipment Recommendations  Equipment Needed: No    General  Patient assessed for rehabilitation services?: Yes  Family/Caregiver Present: Yes  Follows Commands: Within Functional Limits    Past Medical History:  has a past medical history of Allergic rhinitis, cause unspecified, Back pain, Bowel obstruction ([facility]), C. difficile diarrhea, CAD (coronary artery disease), Cardiac murmur, Cellulitis, Cerebral artery occlusion with cerebral infarction ([facility]), COVID-19, Diverticulosis of colon (without mention of hemorrhage), GERD (gastroesophageal reflux disease), History of blood transfusion, History of CHF (congestive heart failure), History of MI (myocardial infarction), History of ovarian cyst, History of peritonitis, HTN (hypertension), Hx of blood clots, Hyperlipidemia, Intestinal or peritoneal adhesions with obstruction (postoperative) (postinfection) (HCC), Kidney infection, Lateral epicondylitis  of elbow, MDRO (multiple drug resistant organisms) resistance, Muscle strain, Other abnormal glucose, PONV (postoperative nausea and vomiting), Pre-diabetes, Restless legs syndrome (RLS), Snores, 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  Past Surgical History:   has a past surgical history that includes Ovary removal (); colectomy (); Appendectomy (); Ovary removal (); Hysterectomy (); Bunionectomy (Left); sinus surgery ();

## 2025-01-30 NOTE — PROGRESS NOTES
[facility] -   Infectious diseases evaluation  admission date 1/21/2025    reason for consultation:   Uti,esbl    Impression :   Current:  Dysuria resolved/doubt UTI  Coronary artery disease  History of C. difficile colitis  History of CVA with left-sided weakness  CHF  Hypertension  Hyperlipidemia  History of ESBL  Allergy to Sulfa-Confusion    HENCE:   Off antibiotics  She received 6 doses of IV meropenem, discontinued 1/23/2025, started 1/21/2025   She received oral vancomycin for prophylaxis 1/22/2025 through 1/23/2025  No objection for discharge from infectious disease point of view  Supportive care.  Discussed with patient.    Infection Control Recommendations   Acton Precautions  Contact Isolation       Antimicrobial Stewardship Recommendations   Simplification of therapy  Targeted therapy      History of Present Illness:   Initial history:  Criselda Tinoco is a 75 y.o.-year-old female presented to hospital with dysuria, frequency and urgency with urination and not feeling well for several days.  She does have chronic lower extremity edema.    Initial WBC 6.5, creatinine 0.8  History of C. difficile colitis    Interval changes  1/30/2025   She was seen and examined, afebrile, no new complaints  1/22 Urine Cx - no growth      Patient Vitals for the past 8 hrs:   BP Temp Temp src Pulse Resp SpO2 Weight   01/30/25 0715 (!) 137/58 97.3 °F (36.3 °C) Oral 57 18 99 % 84.3 kg (185 lb 13.6 oz)   01/30/25 0606 (!) 128/54 97.5 °F (36.4 °C) Oral 63 18 95 % --       I have personally reviewed the past medical history, past surgical history, medications, social history, and family history, and I haveupdated the database accordingly.      Allergies:   Fentanyl, Bactrim [sulfamethoxazole-trimethoprim], Cat dander, Codeine, Diazepam, Meperidine hcl, and Seasonal     Review of Systems:     Review of Systems   All other systems reviewed and are negative.      Physical  Examination :       Physical Exam  Vitals and nursing note reviewed.   Constitutional:       General: She is not in acute distress.     Appearance: Normal appearance. She is not ill-appearing.   HENT:      Head: Normocephalic and atraumatic.      Right Ear: External ear normal.      Left Ear: External ear normal.      Nose: Nose normal.      Mouth/Throat:      Mouth: Mucous membranes are moist.      Pharynx: Oropharynx is clear.   Eyes:      General: No scleral icterus.     Conjunctiva/sclera: Conjunctivae normal.   Cardiovascular:      Rate and Rhythm: Normal rate and regular rhythm.      Heart sounds: Normal heart sounds.   Pulmonary:      Effort: Pulmonary effort is normal. No respiratory distress.      Breath sounds: Normal breath sounds.      Comments: RA  Abdominal:      General: There is no distension.      Palpations: Abdomen is soft.      Tenderness: There is no abdominal tenderness.   Genitourinary:     Comments: No guerrero.  Musculoskeletal:      Cervical back: Neck supple.      Right lower leg: No edema.      Left lower leg: No edema.   Skin:     General: Skin is warm and dry.      Capillary Refill: Capillary refill takes less than 2 seconds.      Coloration: Skin is not jaundiced.   Neurological:      Mental Status: She is alert and oriented to person, place, and time.   Psychiatric:         Mood and Affect: Mood normal.         Behavior: Behavior normal.         Past Medical History:     Past Medical History:   Diagnosis Date    Allergic rhinitis, cause unspecified     Back pain     lumbar    Bowel obstruction (HCC)     history of due to scar tissue, resolved non-surgically    C. difficile diarrhea     CAD (coronary artery disease)     no stent needed per pt. Dr. Solorzano did cath at  2005    Cardiac murmur     Cellulitis 2017 August    leg left leg/bug bite    Cerebral artery occlusion with cerebral infarction (HCC)     TIA 2014    COVID-19     ONE YR AGO IN 4/25/2020 fever and cough    Diverticulosis

## 2025-01-30 NOTE — CARE COORDINATION
Transportation arranged for patient to go to [facility] at [address] [facility] by stretcher. Facility informed. Bedside nurse informed.     Number for Report: [phone]  Electronically signed by HIPOLITO Hanson on 1/30/2025 at 5:18 PM    Call placed to pt spouse Will to confirm transportation time. No answer, voicemail left. Call placed to Chantell with [facility], confirms facility can pull ANNALEE when RN portion has been completed.  Electronically signed by HIPOLITO Hanson on 1/30/2025 at 5:29 PM

## 2025-01-30 NOTE — PROGRESS NOTES
Occupational Therapy  [facility]   INPATIENT OCCUPATIONAL THERAPY  PROGRESS NOTE  Date: 2025  Patient Name: Criselda Tinoco       Room:   MRN: [id number]    : 1950  (75 y.o.)  Gender: female   Referring Practitioner: Jillian George MD  Diagnosis: UTI due to extended spectrum beta lactamase producing escherichia coli      Discharge Recommendations:  Further Occupational Therapy is recommended upon facility discharge.    OT Equipment Recommendations  Equipment Needed: Yes  Mobility Devices: Walker  Walker: Rolling  Other: TBD    Restrictions/Precautions  Restrictions/Precautions  Restrictions/Precautions: Fall Risk;General Precautions;Contact Precautions  Activity Level: Up as Tolerated;Up with Assist  Required Braces or Orthoses?: No  Implants Present? : Metal implants    O2 Device: None (Room air)    Subjective  Subjective  Subjective: pt sitting on toilet as writer arrives, pt is pleasant and motivated for therapy this date.  Pain  Pre-Pain: 5  Post-Pain: 8  Pain Location: Neck (posterior)  Pain Descriptor: Aching  Pain Interventions: Rest  Comments: RN jeff krause, co-tx moy Downs PTA    Objective  Orientation  Overall Orientation Status: Within Functional Limits  Orientation Level: Oriented X4    Activities of Daily Living  Grooming: Setup  Grooming Skilled Clinical Factors: pt washes face/neck sitting EOB  UE Bathing: Minimal assistance  UE Bathing Skilled Clinical Factors: pt washes BUE, chest/abdomen. min A at R biceps/shoulder level  Putting On/Taking Off Footwear: Dependent/Total  Putting On/Taking Off Footwear Skilled Clinical Factors: TA to don footies  Toileting: Moderate assistance  Toileting Skilled Clinical Factors: Standing in natan stedy for brice care, TA for buttocks hygiene  Additional Comments: Pt is currently limited by decreased strength, endurance, activity tolerance, impaired balance, general weakness and cognitive barriers which impacts the pt's  Decreased strength, Decreased safe awareness, Decreased endurance, Decreased balance, Decreased high-level IADLs, Decreased fine motor control, Decreased posture, Decreased coordination  Treatment Diagnosis: Impaired self-care status  Prognosis: Fair  Decision Making: Medium Complexity  Discharge Recommendations: Patient would benefit from continued therapy after discharge  OT Equipment Recommendations  Equipment Needed: Yes  Mobility Devices: Walker  Walker: Rolling  Other: TBD  Safety Devices  Type of Devices: Call light within reach, Gait belt, Left in chair    AM-PAC Daily Activities Inpatient  AM-PAC Daily Activity - Inpatient   How much help is needed for putting on and taking off regular lower body clothing?: Total  How much help is needed for bathing (which includes washing, rinsing, drying)?: A Lot  How much help is needed for toileting (which includes using toilet, bedpan, or urinal)?: A Lot  How much help is needed for putting on and taking off regular upper body clothing?: A Little  How much help is needed for taking care of personal grooming?: A Little  How much help for eating meals?: A Little  AM-PAC Inpatient Daily Activity Raw Score: 14  AM-PAC Inpatient ADL T-Scale Score : 33.39  ADL Inpatient CMS 0-100% Score: 59.67  ADL Inpatient CMS G-Code Modifier : CK    OT Minutes  OT Individual Minutes  Time In: 0908  Time Out: 0951  Minutes: 43    Co-treatment with PT warranted secondary to decreased safety and independence requiring 2 skilled therapy professionals to address individual discipline's goals. OT addressing sitting balance for increased ADL performance, sitting/activity tolerance, functional reaching, fall prevention, and functional mobility for ADL transfers.      Electronically signed by PATRICIA Concepcion on 1/30/25 at 10:09 AM EST

## 2025-01-30 NOTE — PLAN OF CARE
Problem: Chronic Conditions and Co-morbidities  Goal: Patient's chronic conditions and co-morbidity symptoms are monitored and maintained or improved  1/30/2025 0605 by Schober, Robyn L, RN  Outcome: Progressing  Flowsheets (Taken 1/29/2025 2009)  Care Plan - Patient's Chronic Conditions and Co-Morbidity Symptoms are Monitored and Maintained or Improved:   Monitor and assess patient's chronic conditions and comorbid symptoms for stability, deterioration, or improvement   Collaborate with multidisciplinary team to address chronic and comorbid conditions and prevent exacerbation or deterioration     Problem: Discharge Planning  Goal: Discharge to home or other facility with appropriate resources  1/30/2025 0605 by Schober, Robyn L, RN  Outcome: Progressing  Flowsheets (Taken 1/29/2025 2009)  Discharge to home or other facility with appropriate resources: Identify barriers to discharge with patient and caregiver     Problem: Skin/Tissue Integrity  Goal: Absence of new skin breakdown  Description: 1.  Monitor for areas of redness and/or skin breakdown  2.  Assess vascular access sites hourly  3.  Every 4-6 hours minimum:  Change oxygen saturation probe site  4.  Every 4-6 hours:  If on nasal continuous positive airway pressure, respiratory therapy assess nares and determine need for appliance change or resting period.  1/30/2025 0605 by Schober, Robyn L, RN  Outcome: Progressing     Problem: Safety - Adult  Goal: Free from fall injury  1/30/2025 0605 by Schober, Robyn L, RN  Outcome: Progressing  Flowsheets (Taken 1/30/2025 0412)  Free From Fall Injury: Based on caregiver fall risk screen, instruct family/caregiver to ask for assistance with transferring infant if caregiver noted to have fall risk factors     Problem: ABCDS Injury Assessment  Goal: Absence of physical injury  1/30/2025 0605 by Schober, Robyn L, RN  Outcome: Progressing  Flowsheets (Taken 1/30/2025 0412)  Absence of Physical Injury: Implement safety  measures based on patient assessment     Problem: Neurosensory - Adult  Goal: Achieves maximal functionality and self care  1/30/2025 0605 by Schober, Robyn L, RN  Outcome: Progressing  Flowsheets (Taken 1/29/2025 2009)  Achieves maximal functionality and self care: Monitor swallowing and airway patency with patient fatigue and changes in neurological status     Problem: Respiratory - Adult  Goal: Achieves optimal ventilation and oxygenation  1/30/2025 0605 by Schober, Robyn L, RN  Outcome: Progressing  Flowsheets (Taken 1/29/2025 2009)  Achieves optimal ventilation and oxygenation:   Assess for changes in respiratory status   Assess for changes in mentation and behavior     Problem: Skin/Tissue Integrity - Adult  Goal: Incisions, wounds, or drain sites healing without S/S of infection  1/30/2025 0605 by Schober, Robyn L, RN  Outcome: Progressing  Flowsheets  Taken 1/30/2025 0412  Incisions, Wounds, or Drain Sites Healing Without Sign and Symptoms of Infection: TWICE DAILY: Assess and document skin integrity  Taken 1/29/2025 2009  Incisions, Wounds, or Drain Sites Healing Without Sign and Symptoms of Infection: TWICE DAILY: Assess and document skin integrity     Problem: Genitourinary - Adult  Goal: Absence of urinary retention  1/30/2025 0605 by Schober, Robyn L, RN  Outcome: Progressing  Flowsheets (Taken 1/29/2025 2009)  Absence of urinary retention: Assess patient’s ability to void and empty bladder     Problem: Infection - Adult  Goal: Absence of infection at discharge  1/30/2025 0605 by Schober, Robyn L, RN  Outcome: Progressing  Flowsheets (Taken 1/29/2025 2009)  Absence of infection at discharge:   Assess and monitor for signs and symptoms of infection   Monitor lab/diagnostic results     Problem: Pain  Goal: Verbalizes/displays adequate comfort level or baseline comfort level  1/30/2025 0605 by Schober, Robyn L, RN  Outcome: Progressing  Flowsheets (Taken 1/29/2025 2126)  Verbalizes/displays adequate

## 2025-01-30 NOTE — DISCHARGE SUMMARY
[facility] Internal Medicine    Jm Epstein MD; Wood St MD, Jean Salguero MD, Sagrario Navarro MD, Ariadne Lantigua MD; Beau Anderson MD      [facility] Internal Medicine  IN-PATIENT SERVICE   [facility]    Discharge Summary     Patient ID: Criselda Tinoco  :  1950   MRN: [id number]     ACCOUNT:  [id number]   Patient's PCP: Wood St MD  Admit Date: 2025   Discharge Date: 2025     Length of Stay: 9  Code Status:  Full Code  Admitting Physician: Jillian George MD  Discharge Physician: Beau Anderson MD     Active Discharge Diagnoses:     Hospital Problem Lists:  Principal Problem:    UTI due to extended-spectrum beta lactamase (ESBL) producing Escherichia coli  Active Problems:    Dysuria    History of infection due to extended spectrum beta lactamase (ESBL) producing bacteria  Resolved Problems:    * No resolved hospital problems. *      Admission Condition:  Serious      Discharged Condition: Stable     Hospital Stay:     Hospital Course: 75-year-old female with urinary frequency and admitted with complicated UTI, was growing Proteus ESBL and E. coli treated with IV meropenem, infectious disease on board, now monitor off antibiotics  history of CVA in the past, on aspirin and Eliquis and statins,  DVT PE on Eliquis,  Chronic diastolic heart failure continue with the home dose of Lasix,  Hypertension, blood pressure controlled,  PM&R seen the patient, has been accepted  Insurance denied acute rehab,  Now SNF placement  Getting discharged in stable condition       Review of system:  Denies any nausea vomiting fever chills,  Denies any headaches or blurred vision,  Denies any chest pain   Denies any cough phlegm hemoptysis,  Denies any abdominal pain diarrhea constipation,    On examination,  Alert awake oriented x3,  S1-S2 present,  CTA bilateral,  Abdomen soft nontender nondistended bowel sounds present   ns:

## 2025-01-31 NOTE — PROGRESS NOTES
Report called to Abhishek at [facility], all questions answered. Transport picked up  patient to transport to facility

## 2025-02-02 NOTE — PROGRESS NOTES
Physician Progress Note      PATIENT:               NICOLE LORD  CSN #:                  [id number]  :                       1950  ADMIT DATE:       2025 5:00 PM  DISCH DATE:        2025 9:33 PM  RESPONDING  PROVIDER #:        Jillian George MD          QUERY TEXT:    Patient admitted with dysuria. Noted documentation of Urinary tract infection   in internal progress note on  with urine culture no growth. In order to   support the diagnosis of urinary tract infection, please include additional   clinical indicators in your documentation.  Or please document if the   diagnosis of urinary tract infection has been ruled out after further study.    The medical record reflects the following:  Risk Factors: Ambulatory and ADL dysfunction due to debility  Clinical Indicators: H&P documented UTI.  Urinary frequency for the past 2   months, urine culture was checked by the nurse visiting at home, they   mentioned that it is growing Proteus ESBL and E. coli.   progress note documented She complains of dysuria urgency, and and   frequency.  [facility] and [facility] her urine   culture grew Proteus Mirabella with ESBL and E. coli.  Infectious consult documented Dysuria resolved/doubt UTI     progress note documented presenting with urinary frequency for the past   2 months.  Noted to have UTI.  Admitted for the management of UTI.    urine culture report states NO SIGNIFICANT GROWTH    Treatment: meropenem (MERREM) 1,000 mg in sodium chloride 0.9 % 100 mL IVPB   (); vancomycin (FIRVANQ) 50 MG/ML oral solution 125 mg    Thank you,  Laura DUMONT, CDS  Options provided:  -- UTI present as evidenced by, Please document evidence.  -- UTI was ruled out  -- Other - I will add my own diagnosis  -- Disagree - Not applicable / Not valid  -- Disagree - Clinically unable to determine / Unknown  -- Refer to Clinical Documentation Reviewer    PROVIDER RESPONSE

## 2025-03-25 ENCOUNTER — TELEPHONE (OUTPATIENT)
Dept: NEUROSURGERY | Age: 75
End: 2025-03-25

## 2025-03-25 NOTE — TELEPHONE ENCOUNTER
Left patient voicemail requesting return call. Patient is scheduled for follow up with Dr. Guy on 3/31/25, there's imaging ordered which needs completed prior to appointment.

## 2025-03-28 NOTE — TELEPHONE ENCOUNTER
Attempted contacting patient in regards to the imaging that needs done prior to her appointment. Requested a call back to reschedule if she is unable to complete it.

## 2025-04-18 NOTE — PROGRESS NOTES
Daily Progress Note  Neuro Critical Care    Patient Name: Ladarius Yu  Patient : 1950  Room/Bed: 0117/0117-01  Code Status: Full  Allergies: Allergies   Allergen Reactions    Bactrim [Sulfamethoxazole-Trimethoprim] Other (See Comments)     confusion    Cat Hair Extract Rash    Codeine Itching    Diazepam Other (See Comments)     Unsure of reaction    Meperidine Hcl Other (See Comments)     Unsure of reaction    Seasonal Other (See Comments)     hayfever       CHIEF COMPLAINT:      Neck pain     INTERVAL HISTORY    Initial Presentation (Admitted 23): The patient is a 68 y.o. female with a history of CAD on eliquis, CHF, HTN, TIA, who presents following scheduled neurosurgical intervention for cervical spondylosis and thoracic kyphosis. Per medical record patient has been experiencing neck pain and weakness and left upper extremity paresthesias and weakness. Patient has a history of gait instability and frequent falls and has required previous cervical decompression and fusion. Patient presents to the neuro ICU following scheduled anterior cervical discectomy, fusion C6-7, posterior cervical laminectomy C1-2 and cervical fusion C2-T2. Following the procedure decision was made for the patient to remain intubated due to the length and complexity of the surgery. Patient arrives to the neuro ICU intubated on propofol for sedation and received paralytic agent per anesthesia prior to transfer and has limited physical examination on arrival. Neuro critical care was consulted for post operative medical management. Hospital Course:   : No acute events overnight. Tolerated SBT, subsequently extubated. Pain medications adjusted and started on bowel regimen. Passed swallow evaluation. Otherwise remained hemodynamically stable with no change in neurological exam.  : JULIO drain with 170 mL out.   : No acute events overnight. Febrile overnight, Tmax 38.3.  CXR showing new right infrahilar REFERRAL APPROVAL NOTICE         Sent on April 18, 2025                   Luke Moon  [address] [facility] [address] [address]  [place] [address]                   Dear Ms. Moon,    After a careful review of the medical information and benefit coverage, [facility] has processed your referral. See below for additional details.    If applicable, you must be actively enrolled with your insurance for coverage of the authorized service. If you have any questions regarding your coverage, please contact your insurance directly.    REFERRAL INFORMATION   Referral #:  [id number]  Referred-To Department    Referred-By Provider:  General Surgery    Lindsay Fall M.D.   Department Of Surgery      [address]  [address]  [place] [address]  [phone] [address], [address]  [place] [address]  [phone]    Referral Start Date:  04/16/2025  Referral End Date:   04/16/2026             SCHEDULING  If you do not already have an appointment, please call [phone] to make an appointment.     MORE INFORMATION  If you do not already have a [facility] account, sign up at: [email or web address]  You can access your medical information, make appointments, see lab results, billing information, and more.  If you have questions regarding this referral, please contact  the [facility] Referrals department at:             [phone]. Monday - Friday 8:00AM - 5:00PM.     Sincerely,    [facility]     osteotomy, fusion C6-7, posterior cervical osteotomy C6-7, cervical fusion C2-T2   - Neurosurgery primary   - Postop CT C-spine reviewed, C2 screw slightly projecting into spinal canal.  -Dilaudid and Roxicodone for pain control  - Goal SBP normotensive   - Neuro checks per protocol     CARDIOVASCULAR:  History of CAD on eliquis, CHF, HTN  - Resume Coreg 12.5 mg BID, resume other home antihypertensives pending trend  - Hold eliquis until approved by neurosurgery   - Continue telemetry     PULMONARY:  - Extubated on 7/27/2023  - Pneumonia, on augmentin, continue  - Satting well on 2L NC  - Continue CPT, IS, suctioning      RENAL/FLUID/ELECTROLYTE:  - BUN 14/ Creatinine 0.5  - IVF: Total fluids 75cc/hr  - UOP 0.7cc/kg/hr  - Resume home Lasix 40 mg BID  - Monitor I&Os  - Start free water flushes 150 mL q6h for mild hypernatremia  - Replace electrolytes PRN  - Daily BMP     GI/NUTRITION:  NUTRITION:  Failed swallow study this morning  - NG tube placed for dysphagia  - Plan to repeat SLP swallow study Monday  - Bowel regimen: GlycoLax as needed   - GI prophylaxis: Pepcid     ID:  - Afebrile, Tmax 37.6  - WBC 5.8  - Continue Augmentin for possible aspiration pneumonia x 5 days  - Continue to monitor for fevers  - Daily CBC     HEME:   - H&H 8.8/26.0  - Platelets 596  - Daily CBC     ENDOCRINE:  - Continue to monitor blood glucose, goal <180  - POC glucose ACHS  - Hemoglobin A1c 5.8     OTHER:  - PT/OT/ST   - Code Status: Full     PROPHYLAXIS:  Stress ulcer: H2 blocker     DVT PROPHYLAXIS:  - SCD sleeves - Thigh High  - Lovenox    DISPOSITION:  [] To remain ICU  [x] OK for out of ICU from Neuro Critical Care standpoint    We will continue to follow along. For any changes in exam or patient status please contact Neuro Critical Care.       Mary George MD  Neuro Critical Care  Pager [phone]  7/31/2023     6:49 AM

## 2025-04-22 ENCOUNTER — TELEPHONE (OUTPATIENT)
Dept: INTERNAL MEDICINE CLINIC | Age: 75
End: 2025-04-22

## 2025-05-07 ENCOUNTER — TELEPHONE (OUTPATIENT)
Dept: INTERNAL MEDICINE CLINIC | Age: 75
End: 2025-05-07

## 2025-05-15 ENCOUNTER — TELEPHONE (OUTPATIENT)
Dept: NEUROSURGERY | Age: 75
End: 2025-05-15

## 2025-05-15 NOTE — TELEPHONE ENCOUNTER
Attempted to reach Patient, To reschedule appointment. Patient needs to complete MRI prior.     Please advise

## 2025-06-07 ENCOUNTER — HOSPITAL ENCOUNTER (OUTPATIENT)
Dept: MRI IMAGING | Age: 75
Discharge: HOME | End: 2025-06-09
Attending: NEUROLOGICAL SURGERY
Payer: MEDICARE

## 2025-06-07 DIAGNOSIS — R26.81 GAIT INSTABILITY: ICD-10-CM

## 2025-06-07 DIAGNOSIS — R27.0 ATAXIA: ICD-10-CM

## 2025-06-07 PROCEDURE — 72146 MRI CHEST SPINE W/O DYE: CPT

## 2025-06-07 PROCEDURE — 72141 MRI NECK SPINE W/O DYE: CPT

## 2025-06-23 ENCOUNTER — TELEMEDICINE (OUTPATIENT)
Dept: NEUROSURGERY | Age: 75
End: 2025-06-23
Payer: MEDICARE

## 2025-06-23 DIAGNOSIS — I69.30 HISTORY OF CVA WITH RESIDUAL DEFICIT: ICD-10-CM

## 2025-06-23 DIAGNOSIS — Z98.1 S/P CERVICAL SPINAL FUSION: ICD-10-CM

## 2025-06-23 DIAGNOSIS — M40.12 OTHER SECONDARY KYPHOSIS, CERVICAL REGION: ICD-10-CM

## 2025-06-23 DIAGNOSIS — F48.2 PSEUDOBULBAR AFFECT: ICD-10-CM

## 2025-06-23 DIAGNOSIS — R26.81 GAIT INSTABILITY: Primary | ICD-10-CM

## 2025-06-23 PROCEDURE — 1123F ACP DISCUSS/DSCN MKR DOCD: CPT | Performed by: NEUROLOGICAL SURGERY

## 2025-06-23 PROCEDURE — 99214 OFFICE O/P EST MOD 30 MIN: CPT | Performed by: NEUROLOGICAL SURGERY

## 2025-06-23 PROCEDURE — 3017F COLORECTAL CA SCREEN DOC REV: CPT | Performed by: NEUROLOGICAL SURGERY

## 2025-06-23 PROCEDURE — G8399 PT W/DXA RESULTS DOCUMENT: HCPCS | Performed by: NEUROLOGICAL SURGERY

## 2025-06-23 PROCEDURE — G8428 CUR MEDS NOT DOCUMENT: HCPCS | Performed by: NEUROLOGICAL SURGERY

## 2025-06-23 PROCEDURE — 1090F PRES/ABSN URINE INCON ASSESS: CPT | Performed by: NEUROLOGICAL SURGERY

## 2025-06-23 NOTE — PROGRESS NOTES
2025    TELEHEALTH EVALUATION -- Audio/Visual    HPI:    Criselda Tinoco (:  1950) has requested an audio/video evaluation for the following concern(s):    She has been living at [facility] for last 4 months. She started with therapy but that has been discontinued.   She c/o instability of use her left arm, due to tremors. She can't see to hold on things. She does c/o of arthritis of left shoulder which does limit her to a degree.  She report continued worsening of weak voice, raspy voice.     Review of Systems    Prior to Visit Medications    Medication Sig Taking? Authorizing Provider   gabapentin (NEURONTIN) 100 MG capsule Take 2 capsules by mouth 2 times daily for 5 days.  Beau Anderson MD   baclofen (LIORESAL) 10 MG tablet Take 1 tablet by mouth 2 times daily  Sagrario Navarro MD   hydrALAZINE (APRESOLINE) 25 MG tablet Take 1 tablet by mouth 3 times daily  Wood [facility] MD   acetaminophen (TYLENOL) 325 MG tablet Take 2 tablets by mouth every 4 hours as needed for Pain  Wood [facility] MD   atorvastatin (LIPITOR) 80 MG tablet Take 1 tablet by mouth daily  Jm Epstein MD   aspirin 81 MG EC tablet Take 1 tablet by mouth daily  Jm Epstein MD   miconazole (MICOTIN) 2 % powder Apply topically 2 times daily.  Jm Epstein MD   busPIRone (BUSPAR) 5 MG tablet Take 1 tablet by mouth 3 times daily  Wood [facility] MD   sertraline (ZOLOFT) 25 MG tablet Take 1 tablet by mouth in the morning, at noon, and at bedtime  Wood [facility] MD   furosemide (LASIX) 40 MG tablet Take 1 tablet by mouth daily  Wood [facility] MD   apixaban (ELIQUIS) 5 MG TABS tablet Take 1 tablet by mouth 2 times daily  [facility] MD   carvedilol (COREG) 25 MG tablet Take 1 tablet by mouth 2 times daily (with meals)  Wood [facility] MD   amLODIPine (NORVASC) 10 MG tablet Take 1 tablet by mouth daily  Wood [facility] MD   potassium chloride (KLOR-CON) 10 MEQ extended release tablet Take 2 tablets by mouth

## (undated) DEVICE — DEFENDO AIR WATER SUCTION AND BIOPSY VALVE KIT FOR  OLYMPUS: Brand: DEFENDO AIR/WATER/SUCTION AND BIOPSY VALVE

## (undated) DEVICE — YANKAUER,FLEXIBLE HANDLE,REGLR CAPACITY: Brand: MEDLINE INDUSTRIES, INC.

## (undated) DEVICE — KIT EVAC 0.13IN RECT TB DIA10FR 400CC PVC 3 SPR Y CONN DRN

## (undated) DEVICE — 3M™ IOBAN™ 2 ANTIMICROBIAL INCISE DRAPE 6650EZ: Brand: IOBAN™ 2

## (undated) DEVICE — GLOVE ORANGE PI 8   MSG9080

## (undated) DEVICE — BLANKET WRM W40.2XL55.9IN IORT LO BODY + MISTRAL AIR

## (undated) DEVICE — C-ARM: Brand: UNBRANDED

## (undated) DEVICE — AGENT HEMOSTATIC SURGIFLOW MATRIX KIT W/THROMBIN

## (undated) DEVICE — NEEDLE SPINAL 22GA L3.5IN SPINOCAN

## (undated) DEVICE — C-ARMOR C-ARM EQUIPMENT COVERS CLEAR STERILE UNIVERSAL FIT 12 PER CASE: Brand: C-ARMOR

## (undated) DEVICE — SUTURE VCRL SZ 2-0 L18IN ABSRB UD CT-1 L36MM 1/2 CIR J839D

## (undated) DEVICE — STVZ LUMBAR SPINE PACK: Brand: MEDLINE INDUSTRIES, INC.

## (undated) DEVICE — GOWN,SURGICAL,AURORA,SLEEVE: Brand: MEDLINE

## (undated) DEVICE — PROTECTOR ULN NRV PUR FOAM HK LOOP STRP ANATOMICALLY

## (undated) DEVICE — SPONGE,NEURO,0.5"X0.5",XR,STRL,10/PK: Brand: MEDLINE

## (undated) DEVICE — Device: Brand: SNAP ON SPHERZ

## (undated) DEVICE — BLADE ES ELASTOMERIC COAT INSUL DURABLE BEND UPTO 90DEG

## (undated) DEVICE — TOTAL TRAY, 16FR 10ML SIL FOLEY, URN: Brand: MEDLINE

## (undated) DEVICE — DRESSING FOAM W4XL10IN AG SIL ADH ANTIMIC POSTOP OPTIFOAM

## (undated) DEVICE — MITT SURG PREP L ADH DISPOSABLE

## (undated) DEVICE — AGENT HEMSTAT W2XL14IN OXIDIZED REGENERATED CELOS ABSRB FOR

## (undated) DEVICE — GLOVE SURG SZ 8 L12IN FNGR THK79MIL GRN LTX FREE

## (undated) DEVICE — MIXER A07A CEMENT

## (undated) DEVICE — BITEBLOCK 54FR W/ DENT RIM BLOX

## (undated) DEVICE — BIT DRL DIA2.5MM DST VOLAR RAD DISP FOR ANAT VOLAR PLATING

## (undated) DEVICE — BUR RND DIA COARSE 5.0MM

## (undated) DEVICE — DISPOSABLE 9450051 NIM 2.3MM BLL TIP PRB

## (undated) DEVICE — Device

## (undated) DEVICE — GLOVE ORANGE PI 7 1/2   MSG9075

## (undated) DEVICE — CONTAINER,SPECIMEN,4OZ,OR STRL: Brand: MEDLINE

## (undated) DEVICE — MARKER,SKIN,WI/RULER AND LABELS: Brand: MEDLINE

## (undated) DEVICE — MHPB HAND AND FOOT PACK: Brand: MEDLINE INDUSTRIES, INC.

## (undated) DEVICE — 3M™ WARMING BLANKET, FULL BODY, 10 PER CASE, 40068: Brand: BAIR HUGGER™

## (undated) DEVICE — APPLICATOR MEDICATED 10.5 CC SOLUTION HI LT ORNG CHLORAPREP

## (undated) DEVICE — SINGLE PORT MANIFOLD: Brand: NEPTUNE 2

## (undated) DEVICE — SPONGE LAP W18XL18IN WHT COT 4 PLY FLD STRUNG RADPQ DISP ST 2 PER PACK

## (undated) DEVICE — PAD,NON-ADHERENT,3X8,STERILE,LF,1/PK: Brand: MEDLINE

## (undated) DEVICE — SUTURE VCRL + SZ 2 L27IN ABSRB UD L40MM CP 1/2 CIR REV CUT VCP195H

## (undated) DEVICE — GOWN,AURORA,NONREINFORCED,LARGE: Brand: MEDLINE

## (undated) DEVICE — FORCEPS BX L240CM WRK CHN 2.8MM STD CAP W/ NDL MIC MESH

## (undated) DEVICE — GARMENT,MEDLINE,DVT,INT,CALF,MED, GEN2: Brand: MEDLINE

## (undated) DEVICE — BLADE CLP TAPR HD WET DRY CAPABILITY GTT IN CHARGING USE

## (undated) DEVICE — GOWN,SIRUS,NONRNF,SETINSLV,XL,20/CS: Brand: MEDLINE

## (undated) DEVICE — SUTURE VCRL + SZ 2-0 L27IN ABSRB UD CT-2 L26MM 1/2 CIR TAPR VCP269H

## (undated) DEVICE — DRAPE,THYROID,SOFT,STERILE: Brand: MEDLINE

## (undated) DEVICE — COLLAR CERV UNIV AD L13-19IN TRACH OPN TWO PC RIG POLYETH

## (undated) DEVICE — YANKAUER,SMOOTH HANDLE,HIGH CAPACITY: Brand: MEDLINE INDUSTRIES, INC.

## (undated) DEVICE — SPONGE,PEANUT,XRAY,ST,SM,3/8",5/CARD: Brand: MEDLINE INDUSTRIES, INC.

## (undated) DEVICE — STRAP,POSITIONING,KNEE/BODY,FOAM,4X60": Brand: MEDLINE

## (undated) DEVICE — DRAPE EQUIP C ARM MINIVIEW

## (undated) DEVICE — JELLY,LUBE,STERILE,FLIP TOP,TUBE,2-OZ: Brand: MEDLINE

## (undated) DEVICE — COVER LT HNDL BLU PLAS

## (undated) DEVICE — SUTURE VCRL + SZ 2-0 L27IN ABSRB UD CP-1 1/2 CIR REV CUT VCP266H

## (undated) DEVICE — SOLUTION SURG PREP ANTIMICROBIAL 4 OZ SKIN WND EXIDINE

## (undated) DEVICE — DRESSING BORDERED ADH GZ UNIV GEN USE 8INX4IN AND 6INX2IN

## (undated) DEVICE — COUNTER NDL 40 COUNT HLD 70 FOAM BLK ADH W/ MAG

## (undated) DEVICE — ELECTRODE PT RET AD L9FT HI MOIST COND ADH HYDRGEL CORDED

## (undated) DEVICE — TOOL 15MH22 LEGEND 15CM 2.2MM MH: Brand: MIDAS REX ™

## (undated) DEVICE — ABS MED DISTRACTION PIN, 14MM PATIENT (INNER): Brand: ABS MED DISTRACTION PIN

## (undated) DEVICE — Z DUPLICATE USE 2527422 TUBING O2 STD 7 FT EXTN NO CRUSH VISUAL CNTRST LF

## (undated) DEVICE — DRAPE,REIN 53X77,STERILE: Brand: MEDLINE

## (undated) DEVICE — GAUZE,SPONGE,4"X4",16PLY,XRAY,STRL,LF: Brand: MEDLINE

## (undated) DEVICE — GLOVE SURG SZ 8 L12IN FNGR THK13MIL BRN LTX SYN POLYMER W

## (undated) DEVICE — GAUZE,SPONGE,FLUFF,6"X6.75",STRL,5/TRAY: Brand: MEDLINE

## (undated) DEVICE — SURGIFOAM SPNG SZ 100

## (undated) DEVICE — TUBING, SUCTION, 9/32" X 20', STRAIGHT: Brand: MEDLINE INDUSTRIES, INC.

## (undated) DEVICE — DISPOSABLE TOURNIQUET CUFF SINGLE BLADDER, SINGLE PORT AND QUICK CONNECT CONNECTOR: Brand: COLOR CUFF

## (undated) DEVICE — Z INACTIVE USE 2525529 CONNECTOR TBNG FOR O2

## (undated) DEVICE — SPONGE LAP W18XL18IN WHT COT 4 PLY FLD STRUNG RADPQ DISP ST

## (undated) DEVICE — CANNULA NSL AD TBNG L7FT PVC STR NONFLARED PRNG O2 DEL W STD

## (undated) DEVICE — MONITORING NEURO WO INTERPRETATION SYS PRICING

## (undated) DEVICE — 3M™ STERI-STRIP™ COMPOUND BENZOIN TINCTURE 40 BAGS/CARTON 4 CARTONS/CASE C1544: Brand: 3M™ STERI-STRIP™

## (undated) DEVICE — GLOVE ORANGE PI 7   MSG9070

## (undated) DEVICE — SOLUTION IV IRRIG WATER 1000ML POUR BRL 2F7114

## (undated) DEVICE — FLOSEAL HEMOSTATIC MATRIX, 5 ML: Brand: FLOSEAL

## (undated) DEVICE — GLOVE ORTHO 8   MSG9480

## (undated) DEVICE — RESERVOIR,SUCTION,100CC,SILICONE: Brand: MEDLINE

## (undated) DEVICE — DRESSING TRNSPAR W2XL2.75IN FLM SHT SEMIPERMEABLE WIND

## (undated) DEVICE — TUBING, SUCTION, 3/16" X 10', STRAIGHT: Brand: MEDLINE

## (undated) DEVICE — SUTURE VCRL SZ 0 L18IN ABSRB UD L36MM CT-1 1/2 CIR J840D

## (undated) DEVICE — BIPOLAR SEALER 23-113-1 AQM 2.3 OM NEURO: Brand: AQUAMANTYS ®

## (undated) DEVICE — BANDAGE,ELASTIC,ESMARK,STERILE,4"X9',LF: Brand: MEDLINE

## (undated) DEVICE — 1LYRTR 16FR10ML100%SIL UMS SNP: Brand: MEDLINE INDUSTRIES, INC.

## (undated) DEVICE — DRESSING TRNSPAR W5XL4.5IN FLM SHT SEMIPERMEABLE WIND

## (undated) DEVICE — SYRINGE MED 10ML TRNSLUC BRL PLUNG BLK MRK POLYPR CTRL

## (undated) DEVICE — SPONGE,NEURO,0.5"X3",XR,STRL,LF,10/PK: Brand: MEDLINE

## (undated) DEVICE — SYRINGE IRRIG 60ML SFT PLIABLE BLB EZ TO GRP 1 HND USE W/

## (undated) DEVICE — CAGE SPNL W14XH7MM D12MM 6DEG ANT CERV INTBDY FUS LORD W/: Type: IMPLANTABLE DEVICE | Site: SPINE CERVICAL | Status: NON-FUNCTIONAL

## (undated) DEVICE — LARGE BLUNT, DUAL PACK: Brand: LINA SKIN HOOK™

## (undated) DEVICE — SUTURE VCRL SZ 3-0 L18IN ABSRB UD L26MM SH 1/2 CIR J864D

## (undated) DEVICE — BANDAGE,GAUZE,CONFORMING,3"X75",STRL,LF: Brand: MEDLINE

## (undated) DEVICE — GLOVE SURG SZ 6 THK91MIL LTX FREE SYN POLYISOPRENE ANTI

## (undated) DEVICE — GOWN,POLY REINFORCED,LG: Brand: MEDLINE

## (undated) DEVICE — SPLINT THMB W3XL12IN FBRGLS PD PRECUT LTWT DURABLE FAST SET

## (undated) DEVICE — COVER,MAYO STAND,STERILE: Brand: MEDLINE

## (undated) DEVICE — SPONGE,NEURO,1"X1",XR,STRL,LF,10/PK: Brand: MEDLINE

## (undated) DEVICE — Z DISCONTINUED NO SUB IDED SPLINT ORTH W3XL12IN WHT FBRGLS 1 SIDE FELT PD CNFRM LO

## (undated) DEVICE — SYRINGE,CONTROL,LL,FINGER,GRIP: Brand: MEDLINE INDUSTRIES, INC.

## (undated) DEVICE — DRESSING HYDROCOLLOID BORDER 35X10 IN ALUM PRIMASEAL

## (undated) DEVICE — PREMIUM DRY TRAY LF: Brand: MEDLINE INDUSTRIES, INC.

## (undated) DEVICE — SOLUTION IV IRRIG POUR BRL 0.9% SODIUM CHL 2F7124

## (undated) DEVICE — STERILE POLYISOPRENE POWDER-FREE SURGICAL GLOVES WITH EMOLLIENT COATING: Brand: PROTEXIS

## (undated) DEVICE — PATIENT RETURN ELECTRODE, SINGLE-USE, CONTACT QUALITY MONITORING, ADULT, WITH 9FT CORD, FOR PATIENTS WEIGING OVER 33LBS. (15KG): Brand: MEGADYNE

## (undated) DEVICE — TOWEL,OR,DSP,ST,NATURAL,DLX,4/PK,20PK/CS: Brand: MEDLINE

## (undated) DEVICE — POUCH INSTR W6.75XL11.5IN FRST 2 PKT ADH FOR ORTH AND

## (undated) DEVICE — BLADE,CARBON-STEEL,11,STRL,DISPOSABLE,TB: Brand: MEDLINE

## (undated) DEVICE — GLOVE SURG SZ 65 THK91MIL LTX FREE SYN POLYISOPRENE

## (undated) DEVICE — DRESSING PETRO W3XL3IN OIL EMUL N ADH GZ KNIT IMPREG CELOS

## (undated) DEVICE — BLADE ES L4IN INSUL EDGE

## (undated) DEVICE — ELECTROSURGICAL PENCIL BUTTON SWITCH E-Z CLEAN COATED BLADE ELECTRODE 10 FT (3 M) CORD HOLSTER: Brand: MEGADYNE

## (undated) DEVICE — SHEET, T, LAPAROTOMY, STERILE: Brand: MEDLINE

## (undated) DEVICE — AGENT HEMSTAT SZ 50 W8XL6.25CM THK10MM PORCINE GEL ABSRB

## (undated) DEVICE — Z DISCONTINUED USE 2424143 ADAPTER O2 SWVL CHRISTMAS TREE GRN

## (undated) DEVICE — STERILE POLYISOPRENE POWDER-FREE SURGICAL GLOVES: Brand: PROTEXIS

## (undated) DEVICE — BONE TAMP KIT KPX203NB AF X2 20/3

## (undated) DEVICE — NEPTUNE E-SEP SMOKE EVACUATION PENCIL, COATED, 70MM BLADE, PUSH BUTTON SWITCH: Brand: NEPTUNE E-SEP

## (undated) DEVICE — DRAPE,UTILTY,TAPE,15X26, 4EA/PK: Brand: MEDLINE

## (undated) DEVICE — NEEDLE, QUINCKE, 18GX3.5": Brand: MEDLINE

## (undated) DEVICE — SUTURE STRATAFIX SYMMETRIC PDS + SZ 0 L18IN ABSRB L36MM SXPP1A401

## (undated) DEVICE — SUTURE NONABSORBABLE MONOFILAMENT 3-0 PS-1 18 IN BLK ETHILON 1663H

## (undated) DEVICE — Device: Brand: CLASSIC-CUF

## (undated) DEVICE — SURGICAL SUCTION CONNECTING TUBE WITH MALE CONNECTOR AND SUCTION CLAMP, 2 FT. LONG (.6 M), 5 MM I.D.: Brand: CONMED

## (undated) DEVICE — FLEXIBLE YANKAUER,LARGE TIP, NO VACUUM CONTROL: Brand: ARGYLE

## (undated) DEVICE — 3M™ STERI-STRIP™ ANTIMICROBIAL SKIN CLOSURES 1 IN X 5 IN, 25/CAR, 4 CAR/CASE A1848: Brand: 3M™ STERI-STRIP™

## (undated) DEVICE — SUTURE NRLN SZ 4-0 L18IN NONABSORBABLE BLK L13MM TF 1/2 CIR C584D

## (undated) DEVICE — CONNECTOR TBNG WHT PLAS SUCT STR 5IN1 LTWT W/ M CONN

## (undated) DEVICE — BNDG,ELSTC,MATRIX,STRL,3"X5YD,LF,HOOK&LP: Brand: MEDLINE

## (undated) DEVICE — ADHESIVE SKIN CLSR 0.7ML TOP DERMBND ADV

## (undated) DEVICE — SUTURE MCRYL + SZ 3-0 L27IN ABSRB UD PS1 L24MM 3/8 CIR REV MCP936H

## (undated) DEVICE — E-Z CLEAN, NON-STICK, PTFE COATED, ELECTROSURGICAL BLADE ELECTRODE, MODIFIED EXTENDED INSULATION, 2.5 INCH (6.35 CM): Brand: MEGADYNE

## (undated) DEVICE — ZIMMER® STERILE DISPOSABLE TOURNIQUET CUFF WITH PLC, DUAL PORT, SINGLE BLADDER, 18 IN. (46 CM)

## (undated) DEVICE — LIQUIBAND RAPID ADHESIVE 36/CS 0.8ML: Brand: MEDLINE

## (undated) DEVICE — BIT DRL L4IN DIA2MM FAST GUID TECHNOLOGY DISP FOR DVR ANAT

## (undated) DEVICE — DRESSING,GAUZE,XEROFORM,CURAD,1"X8",ST: Brand: CURAD

## (undated) DEVICE — DRAPE MICSCP W117XL305CM DIA65MM LENS W VARI LENS2 FOR LEICA

## (undated) DEVICE — PACK PROCEDURE SURG LUMBAR SPINE SVMMC

## (undated) DEVICE — NEEDLE HYPO 25GA L1.5IN BLU POLYPR HUB S STL REG BVL STR